# Patient Record
Sex: MALE | Race: WHITE | NOT HISPANIC OR LATINO | Employment: OTHER | URBAN - METROPOLITAN AREA
[De-identification: names, ages, dates, MRNs, and addresses within clinical notes are randomized per-mention and may not be internally consistent; named-entity substitution may affect disease eponyms.]

---

## 2020-01-23 ENCOUNTER — CARE COORDINATION (OUTPATIENT)
Dept: CARDIOLOGY | Facility: CLINIC | Age: 65
End: 2020-01-23

## 2020-01-23 PROBLEM — E11.22 TYPE 2 DIABETES MELLITUS WITH STAGE 3 CHRONIC KIDNEY DISEASE, WITHOUT LONG-TERM CURRENT USE OF INSULIN (H): Status: ACTIVE | Noted: 2019-10-07

## 2020-01-23 PROBLEM — I42.8 NONISCHEMIC CARDIOMYOPATHY (H): Status: ACTIVE | Noted: 2020-01-14

## 2020-01-23 PROBLEM — I25.10 CORONARY ARTERY DISEASE INVOLVING NATIVE CORONARY ARTERY OF NATIVE HEART WITHOUT ANGINA PECTORIS: Status: ACTIVE | Noted: 2020-01-14

## 2020-01-23 PROBLEM — N18.30 TYPE 2 DIABETES MELLITUS WITH STAGE 3 CHRONIC KIDNEY DISEASE, WITHOUT LONG-TERM CURRENT USE OF INSULIN (H): Status: ACTIVE | Noted: 2019-10-07

## 2020-01-23 RX ORDER — SPIRONOLACTONE 25 MG/1
25 TABLET ORAL DAILY
Status: ON HOLD | COMMUNITY
Start: 2020-01-23 | End: 2020-03-19

## 2020-01-23 RX ORDER — UBIDECARENONE 200 MG
1 CAPSULE ORAL DAILY
Status: ON HOLD | COMMUNITY
End: 2020-03-19

## 2020-01-23 RX ORDER — ALBUTEROL SULFATE 90 UG/1
2 AEROSOL, METERED RESPIRATORY (INHALATION) EVERY 4 HOURS PRN
Status: ON HOLD | COMMUNITY
End: 2020-03-19

## 2020-01-23 RX ORDER — ASPIRIN 81 MG/1
81 TABLET ORAL DAILY
Status: ON HOLD | COMMUNITY
End: 2020-03-19

## 2020-01-23 RX ORDER — FUROSEMIDE 40 MG
40 TABLET ORAL DAILY
Status: ON HOLD | COMMUNITY
Start: 2020-01-23 | End: 2020-03-19

## 2020-01-23 RX ORDER — GLIPIZIDE 2.5 MG/1
2.5 TABLET, EXTENDED RELEASE ORAL DAILY
Status: ON HOLD | COMMUNITY
End: 2020-03-19

## 2020-01-23 RX ORDER — ALLOPURINOL 100 MG/1
200 TABLET ORAL DAILY
Status: ON HOLD | COMMUNITY
Start: 2014-01-06 | End: 2020-03-19

## 2020-01-23 RX ORDER — INSULIN GLARGINE 100 [IU]/ML
15 INJECTION, SOLUTION SUBCUTANEOUS AT BEDTIME
Status: ON HOLD | COMMUNITY
End: 2020-03-19

## 2020-01-23 RX ORDER — ACETAMINOPHEN 500 MG
1000 TABLET ORAL DAILY PRN
Status: ON HOLD | COMMUNITY
End: 2020-03-19

## 2020-01-23 RX ORDER — MAGNESIUM OXIDE 400 MG/1
400 TABLET ORAL DAILY
Status: ON HOLD | COMMUNITY
End: 2020-03-19

## 2020-01-23 RX ORDER — ONDANSETRON 4 MG/1
4 TABLET, FILM COATED ORAL EVERY 4 HOURS PRN
Status: ON HOLD | COMMUNITY
Start: 2019-12-24 | End: 2020-11-15

## 2020-01-23 RX ORDER — ATORVASTATIN CALCIUM 20 MG/1
20 TABLET, FILM COATED ORAL DAILY
Status: ON HOLD | COMMUNITY
Start: 2019-11-07 | End: 2020-03-19

## 2020-01-23 NOTE — PROGRESS NOTES
Referral- Heart Failure    Referring Clinic: [facility]   Referring Provider: Dr. Sykes and Dr. Kay Browne  Contact Information: Phone: [phone] Fax:[phone]      [facility] Notes:       January 23, 2020 Patient was just released from [facility] today and was being referred to Dr. Cisneros's out reach clinic in Feb.    January 23, 2020 Left message for patient to call back.      Insurance - Entered    Patient Contact - Yes    Sending to [facility] Nurse to Triage    Paulie Ramsey [facility] Heart [facility]  CORE/Heart Failure   Heart Failure Referral Coordinator  Phone: [phone]

## 2020-01-29 ENCOUNTER — TRANSFERRED RECORDS (OUTPATIENT)
Dept: HEALTH INFORMATION MANAGEMENT | Facility: CLINIC | Age: 65
End: 2020-01-29

## 2020-02-03 ENCOUNTER — DOCUMENTATION ONLY (OUTPATIENT)
Dept: CARE COORDINATION | Facility: CLINIC | Age: 65
End: 2020-02-03

## 2020-02-06 ENCOUNTER — APPOINTMENT (OUTPATIENT)
Dept: GENERAL RADIOLOGY | Facility: CLINIC | Age: 65
DRG: 001 | End: 2020-02-06
Attending: INTERNAL MEDICINE
Payer: MEDICARE

## 2020-02-06 ENCOUNTER — HOSPITAL ENCOUNTER (INPATIENT)
Facility: CLINIC | Age: 65
LOS: 43 days | Discharge: HOME | DRG: 001 | End: 2020-03-20
Attending: INTERNAL MEDICINE | Admitting: INTERNAL MEDICINE
Payer: MEDICARE

## 2020-02-06 ENCOUNTER — TRANSFERRED RECORDS (OUTPATIENT)
Dept: HEALTH INFORMATION MANAGEMENT | Facility: CLINIC | Age: 65
End: 2020-02-06

## 2020-02-06 DIAGNOSIS — I50.9 HEART FAILURE (H): ICD-10-CM

## 2020-02-06 DIAGNOSIS — I50.814 RIGHT HEART FAILURE SECONDARY TO LEFT HEART FAILURE (H): ICD-10-CM

## 2020-02-06 DIAGNOSIS — I46.9 CARDIAC ARREST (H): ICD-10-CM

## 2020-02-06 DIAGNOSIS — Z95.811 LVAD (LEFT VENTRICULAR ASSIST DEVICE) PRESENT (H): Primary | ICD-10-CM

## 2020-02-06 DIAGNOSIS — Z95.810 ICD (IMPLANTABLE CARDIOVERTER-DEFIBRILLATOR) IN PLACE: ICD-10-CM

## 2020-02-06 DIAGNOSIS — J94.2 HEMOTHORAX, RIGHT: ICD-10-CM

## 2020-02-06 LAB
BASE DEFICIT BLDA-SCNC: 5.8 MMOL/L
BASE DEFICIT BLDV-SCNC: 4.7 MMOL/L
ERYTHROCYTE [DISTWIDTH] IN BLOOD BY AUTOMATED COUNT: 18.6 % (ref 10–15)
GLUCOSE BLD-MCNC: 214 MG/DL (ref 70–99)
GLUCOSE BLDC GLUCOMTR-MCNC: 269 MG/DL (ref 70–99)
HCO3 BLD-SCNC: 18 MMOL/L (ref 21–28)
HCO3 BLDV-SCNC: 21 MMOL/L (ref 21–28)
HCT VFR BLD AUTO: 44.4 % (ref 40–53)
HGB BLD-MCNC: 14 G/DL (ref 13.3–17.7)
INR PPP: 1.32 (ref 0.86–1.14)
LACTATE BLD-SCNC: 2 MMOL/L (ref 0.7–2)
MCH RBC QN AUTO: 27.6 PG (ref 26.5–33)
MCHC RBC AUTO-ENTMCNC: 31.5 G/DL (ref 31.5–36.5)
MCV RBC AUTO: 87 FL (ref 78–100)
O2/TOTAL GAS SETTING VFR VENT: 21 %
O2/TOTAL GAS SETTING VFR VENT: 21 %
OXYHGB MFR BLD: 94 % (ref 92–100)
OXYHGB MFR BLDV: 54 %
PCO2 BLD: 29 MM HG (ref 35–45)
PCO2 BLDV: 38 MM HG (ref 40–50)
PH BLD: 7.39 PH (ref 7.35–7.45)
PH BLDV: 7.34 PH (ref 7.32–7.43)
PLATELET # BLD AUTO: 268 10E9/L (ref 150–450)
PO2 BLD: 76 MM HG (ref 80–105)
PO2 BLDV: 33 MM HG (ref 25–47)
RBC # BLD AUTO: 5.08 10E12/L (ref 4.4–5.9)
WBC # BLD AUTO: 9 10E9/L (ref 4–11)

## 2020-02-06 PROCEDURE — 25000132 ZZH RX MED GY IP 250 OP 250 PS 637: Mod: GY

## 2020-02-06 PROCEDURE — 83735 ASSAY OF MAGNESIUM: CPT

## 2020-02-06 PROCEDURE — 82805 BLOOD GASES W/O2 SATURATION: CPT

## 2020-02-06 PROCEDURE — 85610 PROTHROMBIN TIME: CPT

## 2020-02-06 PROCEDURE — 84484 ASSAY OF TROPONIN QUANT: CPT

## 2020-02-06 PROCEDURE — 80053 COMPREHEN METABOLIC PANEL: CPT

## 2020-02-06 PROCEDURE — 40000986 XR CHEST PORT 1 VW

## 2020-02-06 PROCEDURE — 83605 ASSAY OF LACTIC ACID: CPT

## 2020-02-06 PROCEDURE — 25000128 H RX IP 250 OP 636

## 2020-02-06 PROCEDURE — 82947 ASSAY GLUCOSE BLOOD QUANT: CPT

## 2020-02-06 PROCEDURE — 00000146 ZZHCL STATISTIC GLUCOSE BY METER IP

## 2020-02-06 PROCEDURE — 83036 HEMOGLOBIN GLYCOSYLATED A1C: CPT

## 2020-02-06 PROCEDURE — 99291 CRITICAL CARE FIRST HOUR: CPT | Mod: AI | Performed by: INTERNAL MEDICINE

## 2020-02-06 PROCEDURE — 84100 ASSAY OF PHOSPHORUS: CPT

## 2020-02-06 PROCEDURE — 25800030 ZZH RX IP 258 OP 636

## 2020-02-06 PROCEDURE — 20000004 ZZH R&B ICU UMMC

## 2020-02-06 PROCEDURE — 85027 COMPLETE CBC AUTOMATED: CPT

## 2020-02-06 RX ORDER — HEPARIN SODIUM 5000 [USP'U]/.5ML
5000 INJECTION, SOLUTION INTRAVENOUS; SUBCUTANEOUS EVERY 12 HOURS
Status: DISCONTINUED | OUTPATIENT
Start: 2020-02-07 | End: 2020-02-08

## 2020-02-06 RX ORDER — LIDOCAINE 40 MG/G
CREAM TOPICAL
Status: DISCONTINUED | OUTPATIENT
Start: 2020-02-06 | End: 2020-02-19

## 2020-02-06 RX ORDER — POTASSIUM CHLORIDE 7.45 MG/ML
10 INJECTION INTRAVENOUS
Status: DISCONTINUED | OUTPATIENT
Start: 2020-02-06 | End: 2020-02-13 | Stop reason: CLARIF

## 2020-02-06 RX ORDER — UBIDECARENONE 100 MG
200 CAPSULE ORAL DAILY
Status: DISCONTINUED | OUTPATIENT
Start: 2020-02-07 | End: 2020-02-22

## 2020-02-06 RX ORDER — NICOTINE POLACRILEX 4 MG
15-30 LOZENGE BUCCAL
Status: DISCONTINUED | OUTPATIENT
Start: 2020-02-06 | End: 2020-02-19

## 2020-02-06 RX ORDER — ACETAMINOPHEN 325 MG/1
650 TABLET ORAL EVERY 4 HOURS PRN
Status: DISCONTINUED | OUTPATIENT
Start: 2020-02-06 | End: 2020-02-09

## 2020-02-06 RX ORDER — POTASSIUM CHLORIDE 29.8 MG/ML
20 INJECTION INTRAVENOUS
Status: DISCONTINUED | OUTPATIENT
Start: 2020-02-06 | End: 2020-02-13 | Stop reason: CLARIF

## 2020-02-06 RX ORDER — POTASSIUM CL/LIDO/0.9 % NACL 10MEQ/0.1L
10 INTRAVENOUS SOLUTION, PIGGYBACK (ML) INTRAVENOUS
Status: DISCONTINUED | OUTPATIENT
Start: 2020-02-06 | End: 2020-02-13 | Stop reason: CLARIF

## 2020-02-06 RX ORDER — POTASSIUM CHLORIDE 750 MG/1
20-40 TABLET, EXTENDED RELEASE ORAL
Status: DISCONTINUED | OUTPATIENT
Start: 2020-02-06 | End: 2020-02-13 | Stop reason: CLARIF

## 2020-02-06 RX ORDER — ACETAMINOPHEN 650 MG/1
650 SUPPOSITORY RECTAL EVERY 4 HOURS PRN
Status: DISCONTINUED | OUTPATIENT
Start: 2020-02-06 | End: 2020-02-07

## 2020-02-06 RX ORDER — ALLOPURINOL 100 MG/1
200 TABLET ORAL DAILY
Status: DISCONTINUED | OUTPATIENT
Start: 2020-02-07 | End: 2020-02-22

## 2020-02-06 RX ORDER — MILRINONE LACTATE 0.2 MG/ML
0.5 INJECTION, SOLUTION INTRAVENOUS CONTINUOUS
Status: DISCONTINUED | OUTPATIENT
Start: 2020-02-06 | End: 2020-02-06

## 2020-02-06 RX ORDER — POTASSIUM CHLORIDE 1.5 G/1.58G
20-40 POWDER, FOR SOLUTION ORAL
Status: DISCONTINUED | OUTPATIENT
Start: 2020-02-06 | End: 2020-02-13 | Stop reason: CLARIF

## 2020-02-06 RX ORDER — ALBUTEROL SULFATE 90 UG/1
2 AEROSOL, METERED RESPIRATORY (INHALATION) EVERY 6 HOURS PRN
Status: DISCONTINUED | OUTPATIENT
Start: 2020-02-06 | End: 2020-03-20 | Stop reason: HOSPADM

## 2020-02-06 RX ORDER — MAGNESIUM SULFATE HEPTAHYDRATE 40 MG/ML
2 INJECTION, SOLUTION INTRAVENOUS DAILY PRN
Status: DISCONTINUED | OUTPATIENT
Start: 2020-02-06 | End: 2020-02-13 | Stop reason: CLARIF

## 2020-02-06 RX ORDER — ATORVASTATIN CALCIUM 10 MG/1
20 TABLET, FILM COATED ORAL EVERY EVENING
Status: DISCONTINUED | OUTPATIENT
Start: 2020-02-06 | End: 2020-02-22

## 2020-02-06 RX ORDER — FUROSEMIDE 10 MG/ML
80 INJECTION INTRAMUSCULAR; INTRAVENOUS ONCE
Status: COMPLETED | OUTPATIENT
Start: 2020-02-06 | End: 2020-02-06

## 2020-02-06 RX ORDER — DEXTROSE MONOHYDRATE 25 G/50ML
25-50 INJECTION, SOLUTION INTRAVENOUS
Status: DISCONTINUED | OUTPATIENT
Start: 2020-02-06 | End: 2020-02-19

## 2020-02-06 RX ORDER — ASPIRIN 81 MG/1
81 TABLET, CHEWABLE ORAL DAILY
Status: DISCONTINUED | OUTPATIENT
Start: 2020-02-07 | End: 2020-02-22

## 2020-02-06 RX ORDER — MAGNESIUM SULFATE HEPTAHYDRATE 40 MG/ML
4 INJECTION, SOLUTION INTRAVENOUS EVERY 4 HOURS PRN
Status: DISCONTINUED | OUTPATIENT
Start: 2020-02-06 | End: 2020-02-13 | Stop reason: CLARIF

## 2020-02-06 RX ORDER — MAGNESIUM OXIDE 400 MG/1
400 TABLET ORAL DAILY
Status: DISCONTINUED | OUTPATIENT
Start: 2020-02-07 | End: 2020-02-22

## 2020-02-06 RX ORDER — MILRINONE LACTATE 0.2 MG/ML
0.5 INJECTION, SOLUTION INTRAVENOUS CONTINUOUS
Status: DISCONTINUED | OUTPATIENT
Start: 2020-02-07 | End: 2020-02-08

## 2020-02-06 RX ADMIN — FUROSEMIDE 80 MG: 10 INJECTION, SOLUTION INTRAVENOUS at 23:43

## 2020-02-06 RX ADMIN — ATORVASTATIN CALCIUM 20 MG: 20 TABLET, FILM COATED ORAL at 23:43

## 2020-02-06 RX ADMIN — MILRINONE LACTATE IN DEXTROSE 0.5 MCG/KG/MIN: 200 INJECTION, SOLUTION INTRAVENOUS at 23:33

## 2020-02-06 RX ADMIN — AMIODARONE HYDROCHLORIDE 1 MG/MIN: 50 INJECTION, SOLUTION INTRAVENOUS at 23:28

## 2020-02-06 ASSESSMENT — MIFFLIN-ST. JEOR: SCORE: 1827.63

## 2020-02-06 NOTE — Clinical Note
Potential access sites were evaluated for patency using ultrasound.   The left femoral artery, right radial artery and right jugular vein was selected. Access was obtained under with Sonosite guidance using a standard 18 gauge needle and 21 gauge Radial  with direct visualization of needle entry.

## 2020-02-06 NOTE — Clinical Note
IABP inserted in the left femoral artery. IABP inserted with 50 cc balloon volume Lot number is: [id number]

## 2020-02-06 NOTE — LETTER
[address] [place] [address]  [phone]    2020    Re: Eliseo Tanner  [address] [place] [address]  [phone] (home) [phone] (work)    : 1955      To Whom It May Concern:      Eliseo Tanner was hospitalized from 2020 until present time.       Cardiology care team.

## 2020-02-06 NOTE — Clinical Note
IABP inserted in the right femoral artery. IABP inserted with 50 cc balloon volume Lot number is: [id number]

## 2020-02-06 NOTE — LETTER
[facility], [facility]  UNIT [address]E [facility] [address]AST [address] [address]  [phone]    To whom it may concern,    Mr. Eliseo Tanner is currently hospitalized at the [facility] in the cardiac ICU.    Sincerely yours,    Reji Delarosa MD

## 2020-02-07 ENCOUNTER — APPOINTMENT (OUTPATIENT)
Dept: CARDIOLOGY | Facility: CLINIC | Age: 65
DRG: 001 | End: 2020-02-07
Attending: INTERNAL MEDICINE
Payer: MEDICARE

## 2020-02-07 ENCOUNTER — APPOINTMENT (OUTPATIENT)
Dept: GENERAL RADIOLOGY | Facility: CLINIC | Age: 65
DRG: 001 | End: 2020-02-07
Attending: INTERNAL MEDICINE
Payer: MEDICARE

## 2020-02-07 ENCOUNTER — TEAM CONFERENCE (OUTPATIENT)
Dept: CARDIOLOGY | Facility: CLINIC | Age: 65
End: 2020-02-07

## 2020-02-07 PROBLEM — I50.814: Status: ACTIVE | Noted: 2020-02-06

## 2020-02-07 LAB
ALBUMIN SERPL-MCNC: 2.9 G/DL (ref 3.4–5)
ALBUMIN SERPL-MCNC: 3.2 G/DL (ref 3.4–5)
ALBUMIN UR-MCNC: 30 MG/DL
ALP SERPL-CCNC: 156 U/L (ref 40–150)
ALP SERPL-CCNC: 175 U/L (ref 40–150)
ALT SERPL W P-5'-P-CCNC: 32 U/L (ref 0–70)
ALT SERPL W P-5'-P-CCNC: 43 U/L (ref 0–70)
ANION GAP SERPL CALCULATED.3IONS-SCNC: 11 MMOL/L (ref 3–14)
ANION GAP SERPL CALCULATED.3IONS-SCNC: 11 MMOL/L (ref 3–14)
ANION GAP SERPL CALCULATED.3IONS-SCNC: 8 MMOL/L (ref 3–14)
APPEARANCE UR: ABNORMAL
AST SERPL W P-5'-P-CCNC: 29 U/L (ref 0–45)
AST SERPL W P-5'-P-CCNC: 49 U/L (ref 0–45)
BASE DEFICIT BLDV-SCNC: 2.9 MMOL/L
BASE DEFICIT BLDV-SCNC: 3.2 MMOL/L
BASE DEFICIT BLDV-SCNC: 3.8 MMOL/L
BASE DEFICIT BLDV-SCNC: 4 MMOL/L
BILIRUB SERPL-MCNC: 0.9 MG/DL (ref 0.2–1.3)
BILIRUB SERPL-MCNC: 1.2 MG/DL (ref 0.2–1.3)
BILIRUB UR QL STRIP: NEGATIVE
BUN SERPL-MCNC: 39 MG/DL (ref 7–30)
BUN SERPL-MCNC: 39 MG/DL (ref 7–30)
BUN SERPL-MCNC: 44 MG/DL (ref 7–30)
CALCIUM SERPL-MCNC: 8.3 MG/DL (ref 8.5–10.1)
CALCIUM SERPL-MCNC: 8.4 MG/DL (ref 8.5–10.1)
CALCIUM SERPL-MCNC: 8.4 MG/DL (ref 8.5–10.1)
CHLORIDE SERPL-SCNC: 104 MMOL/L (ref 94–109)
CHLORIDE SERPL-SCNC: 107 MMOL/L (ref 94–109)
CHLORIDE SERPL-SCNC: 108 MMOL/L (ref 94–109)
CO2 SERPL-SCNC: 17 MMOL/L (ref 20–32)
CO2 SERPL-SCNC: 18 MMOL/L (ref 20–32)
CO2 SERPL-SCNC: 22 MMOL/L (ref 20–32)
COLOR UR AUTO: YELLOW
CREAT SERPL-MCNC: 1.73 MG/DL (ref 0.66–1.25)
CREAT SERPL-MCNC: 1.94 MG/DL (ref 0.66–1.25)
CREAT SERPL-MCNC: 2.78 MG/DL (ref 0.66–1.25)
ERYTHROCYTE [DISTWIDTH] IN BLOOD BY AUTOMATED COUNT: 18.3 % (ref 10–15)
GFR SERPL CREATININE-BSD FRML MDRD: 23 ML/MIN/{1.73_M2}
GFR SERPL CREATININE-BSD FRML MDRD: 35 ML/MIN/{1.73_M2}
GFR SERPL CREATININE-BSD FRML MDRD: 40 ML/MIN/{1.73_M2}
GLUCOSE BLDC GLUCOMTR-MCNC: 145 MG/DL (ref 70–99)
GLUCOSE BLDC GLUCOMTR-MCNC: 146 MG/DL (ref 70–99)
GLUCOSE BLDC GLUCOMTR-MCNC: 149 MG/DL (ref 70–99)
GLUCOSE BLDC GLUCOMTR-MCNC: 149 MG/DL (ref 70–99)
GLUCOSE BLDC GLUCOMTR-MCNC: 174 MG/DL (ref 70–99)
GLUCOSE BLDC GLUCOMTR-MCNC: 202 MG/DL (ref 70–99)
GLUCOSE SERPL-MCNC: 160 MG/DL (ref 70–99)
GLUCOSE SERPL-MCNC: 193 MG/DL (ref 70–99)
GLUCOSE SERPL-MCNC: 207 MG/DL (ref 70–99)
GLUCOSE UR STRIP-MCNC: NEGATIVE MG/DL
HBA1C MFR BLD: 7.2 % (ref 0–5.6)
HCO3 BLDV-SCNC: 22 MMOL/L (ref 21–28)
HCO3 BLDV-SCNC: 22 MMOL/L (ref 21–28)
HCO3 BLDV-SCNC: 23 MMOL/L (ref 21–28)
HCO3 BLDV-SCNC: 23 MMOL/L (ref 21–28)
HCT VFR BLD AUTO: 41.5 % (ref 40–53)
HGB BLD-MCNC: 13.6 G/DL (ref 13.3–17.7)
HGB UR QL STRIP: NEGATIVE
HYALINE CASTS #/AREA URNS LPF: 32 /LPF (ref 0–2)
INTERPRETATION ECG - MUSE: NORMAL
KETONES UR STRIP-MCNC: NEGATIVE MG/DL
LACTATE BLD-SCNC: 0.9 MMOL/L (ref 0.7–2)
LACTATE BLD-SCNC: 1.4 MMOL/L (ref 0.7–2)
LACTATE BLD-SCNC: 2 MMOL/L (ref 0.7–2)
LEUKOCYTE ESTERASE UR QL STRIP: NEGATIVE
MAGNESIUM SERPL-MCNC: 1.9 MG/DL (ref 1.6–2.3)
MCH RBC QN AUTO: 28.3 PG (ref 26.5–33)
MCHC RBC AUTO-ENTMCNC: 32.8 G/DL (ref 31.5–36.5)
MCV RBC AUTO: 86 FL (ref 78–100)
NITRATE UR QL: NEGATIVE
O2/TOTAL GAS SETTING VFR VENT: ABNORMAL %
O2/TOTAL GAS SETTING VFR VENT: NORMAL %
OXYHGB MFR BLDV: 46 %
OXYHGB MFR BLDV: 50 %
OXYHGB MFR BLDV: 56 %
OXYHGB MFR BLDV: 65 %
PCO2 BLDV: 43 MM HG (ref 40–50)
PCO2 BLDV: 44 MM HG (ref 40–50)
PH BLDV: 7.31 PH (ref 7.32–7.43)
PH BLDV: 7.32 PH (ref 7.32–7.43)
PH BLDV: 7.33 PH (ref 7.32–7.43)
PH BLDV: 7.33 PH (ref 7.32–7.43)
PH UR STRIP: 5 PH (ref 5–7)
PHOSPHATE SERPL-MCNC: 3.9 MG/DL (ref 2.5–4.5)
PLATELET # BLD AUTO: 240 10E9/L (ref 150–450)
PO2 BLDV: 30 MM HG (ref 25–47)
PO2 BLDV: 33 MM HG (ref 25–47)
PO2 BLDV: 34 MM HG (ref 25–47)
PO2 BLDV: 40 MM HG (ref 25–47)
POTASSIUM SERPL-SCNC: 3.9 MMOL/L (ref 3.4–5.3)
POTASSIUM SERPL-SCNC: 4 MMOL/L (ref 3.4–5.3)
POTASSIUM SERPL-SCNC: 4.2 MMOL/L (ref 3.4–5.3)
POTASSIUM SERPL-SCNC: 4.2 MMOL/L (ref 3.4–5.3)
PROT SERPL-MCNC: 7 G/DL (ref 6.8–8.8)
PROT SERPL-MCNC: 7.5 G/DL (ref 6.8–8.8)
RBC # BLD AUTO: 4.81 10E12/L (ref 4.4–5.9)
RBC #/AREA URNS AUTO: 7 /HPF (ref 0–2)
SODIUM SERPL-SCNC: 134 MMOL/L (ref 133–144)
SODIUM SERPL-SCNC: 136 MMOL/L (ref 133–144)
SODIUM SERPL-SCNC: 136 MMOL/L (ref 133–144)
SOURCE: ABNORMAL
SP GR UR STRIP: 1.02 (ref 1–1.03)
SPERM #/AREA URNS HPF: PRESENT /HPF
SQUAMOUS #/AREA URNS AUTO: 2 /HPF (ref 0–1)
TROPONIN I SERPL-MCNC: 0.14 UG/L (ref 0–0.04)
TROPONIN I SERPL-MCNC: 0.15 UG/L (ref 0–0.04)
UROBILINOGEN UR STRIP-MCNC: NORMAL MG/DL (ref 0–2)
WBC # BLD AUTO: 7 10E9/L (ref 4–11)
WBC #/AREA URNS AUTO: 3 /HPF (ref 0–5)

## 2020-02-07 PROCEDURE — 40000048 ZZH STATISTIC DAILY SWAN MONITORING

## 2020-02-07 PROCEDURE — 83036 HEMOGLOBIN GLYCOSYLATED A1C: CPT

## 2020-02-07 PROCEDURE — 40000076 ZZH STATISTIC IABP MONITORING

## 2020-02-07 PROCEDURE — 5A02210 ASSISTANCE WITH CARDIAC OUTPUT USING BALLOON PUMP, CONTINUOUS: ICD-10-PCS | Performed by: INTERNAL MEDICINE

## 2020-02-07 PROCEDURE — 93306 TTE W/DOPPLER COMPLETE: CPT | Mod: 26 | Performed by: INTERNAL MEDICINE

## 2020-02-07 PROCEDURE — 93306 TTE W/DOPPLER COMPLETE: CPT

## 2020-02-07 PROCEDURE — 80320 DRUG SCREEN QUANTALCOHOLS: CPT | Performed by: INTERNAL MEDICINE

## 2020-02-07 PROCEDURE — 25000128 H RX IP 250 OP 636: Performed by: INTERNAL MEDICINE

## 2020-02-07 PROCEDURE — 40000281 ZZH STATISTIC TRANSPORT TIME EA 15 MIN

## 2020-02-07 PROCEDURE — 27210794 ZZH OR GENERAL SUPPLY STERILE: Performed by: INTERNAL MEDICINE

## 2020-02-07 PROCEDURE — 82805 BLOOD GASES W/O2 SATURATION: CPT | Performed by: INTERNAL MEDICINE

## 2020-02-07 PROCEDURE — 80053 COMPREHEN METABOLIC PANEL: CPT

## 2020-02-07 PROCEDURE — 33967 INSERT I-AORT PERCUT DEVICE: CPT | Performed by: INTERNAL MEDICINE

## 2020-02-07 PROCEDURE — 99291 CRITICAL CARE FIRST HOUR: CPT | Mod: 25 | Performed by: INTERNAL MEDICINE

## 2020-02-07 PROCEDURE — 84484 ASSAY OF TROPONIN QUANT: CPT

## 2020-02-07 PROCEDURE — 85027 COMPLETE CBC AUTOMATED: CPT

## 2020-02-07 PROCEDURE — 93005 ELECTROCARDIOGRAM TRACING: CPT

## 2020-02-07 PROCEDURE — G0463 HOSPITAL OUTPT CLINIC VISIT: HCPCS

## 2020-02-07 PROCEDURE — 40000196 ZZH STATISTIC RAPCV CVP MONITORING

## 2020-02-07 PROCEDURE — 27210140 ZZH KIT CATH SWAN VIP SUPPLY

## 2020-02-07 PROCEDURE — 83605 ASSAY OF LACTIC ACID: CPT

## 2020-02-07 PROCEDURE — 40000275 ZZH STATISTIC RCP TIME EA 10 MIN

## 2020-02-07 PROCEDURE — 25000132 ZZH RX MED GY IP 250 OP 250 PS 637: Mod: GY

## 2020-02-07 PROCEDURE — 20000004 ZZH R&B ICU UMMC

## 2020-02-07 PROCEDURE — 25000125 ZZHC RX 250: Performed by: INTERNAL MEDICINE

## 2020-02-07 PROCEDURE — 25000128 H RX IP 250 OP 636: Performed by: STUDENT IN AN ORGANIZED HEALTH CARE EDUCATION/TRAINING PROGRAM

## 2020-02-07 PROCEDURE — 80048 BASIC METABOLIC PNL TOTAL CA: CPT | Performed by: INTERNAL MEDICINE

## 2020-02-07 PROCEDURE — 25800030 ZZH RX IP 258 OP 636

## 2020-02-07 PROCEDURE — 25800030 ZZH RX IP 258 OP 636: Performed by: INTERNAL MEDICINE

## 2020-02-07 PROCEDURE — 25500064 ZZH RX 255 OP 636: Performed by: INTERNAL MEDICINE

## 2020-02-07 PROCEDURE — 93010 ELECTROCARDIOGRAM REPORT: CPT | Performed by: INTERNAL MEDICINE

## 2020-02-07 PROCEDURE — 25000132 ZZH RX MED GY IP 250 OP 250 PS 637: Mod: GY | Performed by: STUDENT IN AN ORGANIZED HEALTH CARE EDUCATION/TRAINING PROGRAM

## 2020-02-07 PROCEDURE — 84132 ASSAY OF SERUM POTASSIUM: CPT

## 2020-02-07 PROCEDURE — 25000131 ZZH RX MED GY IP 250 OP 636 PS 637: Mod: GY

## 2020-02-07 PROCEDURE — 00000146 ZZHCL STATISTIC GLUCOSE BY METER IP

## 2020-02-07 PROCEDURE — 40000986 XR CHEST PORT 1 VW

## 2020-02-07 PROCEDURE — 82805 BLOOD GASES W/O2 SATURATION: CPT | Performed by: PHYSICIAN ASSISTANT

## 2020-02-07 PROCEDURE — 25000128 H RX IP 250 OP 636

## 2020-02-07 PROCEDURE — 40000014 ZZH STATISTIC ARTERIAL MONITORING DAILY

## 2020-02-07 PROCEDURE — 80307 DRUG TEST PRSMV CHEM ANLYZR: CPT | Performed by: INTERNAL MEDICINE

## 2020-02-07 PROCEDURE — 81001 URINALYSIS AUTO W/SCOPE: CPT | Performed by: INTERNAL MEDICINE

## 2020-02-07 PROCEDURE — 27210305 ZZH CATH BALLOON IABP

## 2020-02-07 PROCEDURE — 83605 ASSAY OF LACTIC ACID: CPT | Performed by: INTERNAL MEDICINE

## 2020-02-07 PROCEDURE — 40000358 ZZHCL STATISTIC DRUG SCREEN MULTIPLE (METRO): Performed by: INTERNAL MEDICINE

## 2020-02-07 RX ORDER — POTASSIUM CHLORIDE 750 MG/1
40 TABLET, EXTENDED RELEASE ORAL
Status: CANCELLED | OUTPATIENT
Start: 2020-02-07

## 2020-02-07 RX ORDER — ACETAMINOPHEN 325 MG/1
325 TABLET ORAL EVERY 4 HOURS PRN
Status: DISCONTINUED | OUTPATIENT
Start: 2020-02-07 | End: 2020-02-09

## 2020-02-07 RX ORDER — NALOXONE HYDROCHLORIDE 0.4 MG/ML
.1-.4 INJECTION, SOLUTION INTRAMUSCULAR; INTRAVENOUS; SUBCUTANEOUS
Status: DISCONTINUED | OUTPATIENT
Start: 2020-02-07 | End: 2020-02-19

## 2020-02-07 RX ORDER — FENTANYL CITRATE 50 UG/ML
25 INJECTION, SOLUTION INTRAMUSCULAR; INTRAVENOUS
Status: DISCONTINUED | OUTPATIENT
Start: 2020-02-07 | End: 2020-02-25

## 2020-02-07 RX ORDER — POTASSIUM CHLORIDE 750 MG/1
20 TABLET, EXTENDED RELEASE ORAL
Status: CANCELLED | OUTPATIENT
Start: 2020-02-07

## 2020-02-07 RX ORDER — SODIUM CHLORIDE 9 MG/ML
INJECTION, SOLUTION INTRAVENOUS CONTINUOUS
Status: CANCELLED | OUTPATIENT
Start: 2020-02-07

## 2020-02-07 RX ORDER — ONDANSETRON 2 MG/ML
4 INJECTION INTRAMUSCULAR; INTRAVENOUS ONCE
Status: COMPLETED | OUTPATIENT
Start: 2020-02-07 | End: 2020-02-07

## 2020-02-07 RX ORDER — FENTANYL CITRATE 50 UG/ML
INJECTION, SOLUTION INTRAMUSCULAR; INTRAVENOUS
Status: DISCONTINUED | OUTPATIENT
Start: 2020-02-07 | End: 2020-02-07 | Stop reason: HOSPADM

## 2020-02-07 RX ORDER — DOPAMINE HYDROCHLORIDE 160 MG/100ML
2 INJECTION, SOLUTION INTRAVENOUS CONTINUOUS
Status: DISCONTINUED | OUTPATIENT
Start: 2020-02-07 | End: 2020-02-12

## 2020-02-07 RX ORDER — CALCIUM CARBONATE 500 MG/1
500 TABLET, CHEWABLE ORAL DAILY PRN
Status: DISCONTINUED | OUTPATIENT
Start: 2020-02-07 | End: 2020-02-22

## 2020-02-07 RX ORDER — BUMETANIDE 0.25 MG/ML
2 INJECTION INTRAMUSCULAR; INTRAVENOUS ONCE
Status: COMPLETED | OUTPATIENT
Start: 2020-02-07 | End: 2020-02-07

## 2020-02-07 RX ORDER — BUMETANIDE 0.25 MG/ML
4 INJECTION INTRAMUSCULAR; INTRAVENOUS EVERY 12 HOURS
Status: DISCONTINUED | OUTPATIENT
Start: 2020-02-07 | End: 2020-02-09

## 2020-02-07 RX ORDER — FENTANYL CITRATE 50 UG/ML
INJECTION, SOLUTION INTRAMUSCULAR; INTRAVENOUS
Status: COMPLETED
Start: 2020-02-07 | End: 2020-02-07

## 2020-02-07 RX ORDER — BUMETANIDE 0.25 MG/ML
5 INJECTION INTRAMUSCULAR; INTRAVENOUS ONCE
Status: COMPLETED | OUTPATIENT
Start: 2020-02-07 | End: 2020-02-07

## 2020-02-07 RX ORDER — LIDOCAINE 40 MG/G
CREAM TOPICAL
Status: CANCELLED | OUTPATIENT
Start: 2020-02-07

## 2020-02-07 RX ADMIN — AMIODARONE HYDROCHLORIDE 1 MG/MIN: 50 INJECTION, SOLUTION INTRAVENOUS at 11:29

## 2020-02-07 RX ADMIN — AMIODARONE HYDROCHLORIDE 1 MG/MIN: 50 INJECTION, SOLUTION INTRAVENOUS at 06:22

## 2020-02-07 RX ADMIN — AMIODARONE HYDROCHLORIDE 1 MG/MIN: 50 INJECTION, SOLUTION INTRAVENOUS at 15:48

## 2020-02-07 RX ADMIN — BUMETANIDE 5 MG: 0.25 INJECTION INTRAMUSCULAR; INTRAVENOUS at 20:11

## 2020-02-07 RX ADMIN — AMIODARONE HYDROCHLORIDE 1 MG/MIN: 50 INJECTION, SOLUTION INTRAVENOUS at 02:44

## 2020-02-07 RX ADMIN — BUMETANIDE 4 MG: 0.25 INJECTION INTRAMUSCULAR; INTRAVENOUS at 17:34

## 2020-02-07 RX ADMIN — FENTANYL CITRATE 25 MCG: 50 INJECTION INTRAMUSCULAR; INTRAVENOUS at 13:50

## 2020-02-07 RX ADMIN — CALCIUM CARBONATE (ANTACID) CHEW TAB 500 MG 500 MG: 500 CHEW TAB at 13:14

## 2020-02-07 RX ADMIN — MIDAZOLAM 1 MG: 1 INJECTION INTRAMUSCULAR; INTRAVENOUS at 13:50

## 2020-02-07 RX ADMIN — INSULIN ASPART 1 UNITS: 100 INJECTION, SOLUTION INTRAVENOUS; SUBCUTANEOUS at 01:58

## 2020-02-07 RX ADMIN — Medication 200 MG: at 08:19

## 2020-02-07 RX ADMIN — MILRINONE LACTATE IN DEXTROSE 0.5 MCG/KG/MIN: 200 INJECTION, SOLUTION INTRAVENOUS at 04:17

## 2020-02-07 RX ADMIN — FLUOXETINE 20 MG: 20 CAPSULE ORAL at 08:18

## 2020-02-07 RX ADMIN — ASPIRIN 81 MG CHEWABLE TABLET 81 MG: 81 TABLET CHEWABLE at 08:17

## 2020-02-07 RX ADMIN — OMEPRAZOLE 20 MG: 20 CAPSULE, DELAYED RELEASE ORAL at 08:17

## 2020-02-07 RX ADMIN — ACETAMINOPHEN 650 MG: 325 TABLET, FILM COATED ORAL at 08:17

## 2020-02-07 RX ADMIN — SACUBITRIL AND VALSARTAN 1 TABLET: 97; 103 TABLET, FILM COATED ORAL at 02:13

## 2020-02-07 RX ADMIN — INSULIN GLARGINE 10 UNITS: 100 INJECTION, SOLUTION SUBCUTANEOUS at 02:13

## 2020-02-07 RX ADMIN — ONDANSETRON 4 MG: 2 INJECTION INTRAMUSCULAR; INTRAVENOUS at 05:20

## 2020-02-07 RX ADMIN — FENTANYL CITRATE 25 MCG: 50 INJECTION, SOLUTION INTRAMUSCULAR; INTRAVENOUS at 13:50

## 2020-02-07 RX ADMIN — ATORVASTATIN CALCIUM 20 MG: 20 TABLET, FILM COATED ORAL at 20:10

## 2020-02-07 RX ADMIN — MILRINONE LACTATE IN DEXTROSE 0.5 MCG/KG/MIN: 200 INJECTION, SOLUTION INTRAVENOUS at 17:42

## 2020-02-07 RX ADMIN — ALLOPURINOL 200 MG: 100 TABLET ORAL at 08:17

## 2020-02-07 RX ADMIN — INSULIN GLARGINE 10 UNITS: 100 INJECTION, SOLUTION SUBCUTANEOUS at 22:20

## 2020-02-07 RX ADMIN — BUMETANIDE 2 MG: 0.25 INJECTION INTRAMUSCULAR; INTRAVENOUS at 05:34

## 2020-02-07 RX ADMIN — HEPARIN SODIUM 5000 UNITS: 5000 INJECTION, SOLUTION INTRAVENOUS; SUBCUTANEOUS at 20:10

## 2020-02-07 RX ADMIN — CHLOROTHIAZIDE 1000 MG: 250 TABLET ORAL at 22:17

## 2020-02-07 RX ADMIN — MILRINONE LACTATE IN DEXTROSE 0.5 MCG/KG/MIN: 200 INJECTION, SOLUTION INTRAVENOUS at 10:03

## 2020-02-07 RX ADMIN — HUMAN ALBUMIN MICROSPHERES AND PERFLUTREN 6 ML: 10; .22 INJECTION, SOLUTION INTRAVENOUS at 10:45

## 2020-02-07 RX ADMIN — HEPARIN SODIUM 5000 UNITS: 5000 INJECTION, SOLUTION INTRAVENOUS; SUBCUTANEOUS at 08:18

## 2020-02-07 RX ADMIN — DOPAMINE HYDROCHLORIDE IN DEXTROSE 3 MCG/KG/MIN: 1.6 INJECTION, SOLUTION INTRAVENOUS at 17:39

## 2020-02-07 RX ADMIN — MAGNESIUM OXIDE 400 MG: 400 TABLET ORAL at 08:19

## 2020-02-07 RX ADMIN — AMIODARONE HYDROCHLORIDE 0.5 MG/MIN: 50 INJECTION, SOLUTION INTRAVENOUS at 20:57

## 2020-02-07 ASSESSMENT — ACTIVITIES OF DAILY LIVING (ADL)
ADLS_ACUITY_SCORE: 14
COGNITION: 0 - NO COGNITION ISSUES REPORTED
RETIRED_EATING: 0-->INDEPENDENT
ADLS_ACUITY_SCORE: 14
AMBULATION: 0-->INDEPENDENT
ADLS_ACUITY_SCORE: 19
BATHING: 0-->INDEPENDENT
ADLS_ACUITY_SCORE: 14
DRESS: 0-->INDEPENDENT
FALL_HISTORY_WITHIN_LAST_SIX_MONTHS: NO
ADLS_ACUITY_SCORE: 14
RETIRED_COMMUNICATION: 0-->UNDERSTANDS/COMMUNICATES WITHOUT DIFFICULTY
ADLS_ACUITY_SCORE: 14
SWALLOWING: 0-->SWALLOWS FOODS/LIQUIDS WITHOUT DIFFICULTY
TRANSFERRING: 0-->INDEPENDENT
TOILETING: 0-->INDEPENDENT

## 2020-02-07 ASSESSMENT — MIFFLIN-ST. JEOR: SCORE: 1845.63

## 2020-02-07 NOTE — PROGRESS NOTES
[facility] Nurse Inpatient Pressure Injury Assessment   Reason for consultation: Evaluate and treat coccyx and buttock      ASSESSMENT  Coccyx and buttock have intact skin and chronic discoloration NOT consistent with pressure. Per pt this is a normal finding prior to admission.   Status: initial assessment  Recommend provider order: NA     TREATMENT PLAN  NA    WOC Nurse follow-up plan:signing off  Nursing to notify the Provider(s) and re-consult the WO Nurse if wound(s) deteriorates or new skin concern.    Patient History  According to provider note(s):  Mr Eliseo Tanner is a 63yo M w/PMHx notable for chronic systolic heart failure, NICM, moderate CAD, HTN, ABHINAV on CPAP, DM2, CKDIII who is transferred to [facility] for evaluation and management of advanced heart failure with arrhythmia.     Objective Data  Containment of urine/stool: Incontinent pad in bed    Current Diet/ Nutrition:  Orders Placed This Encounter      NPO for Medical/Clinical Reasons Except for: Meds    Output:   I/O last 3 completed shifts:  In: 710.4 [P.O.:90; I.V.:620.4]  Out: 250 [Urine:250]    Risk Assessment:   Sensory Perception: 4-->no impairment  Moisture: 4-->rarely moist  Activity: 1-->bedfast  Mobility: 4-->no limitation  Nutrition: 2-->probably inadequate  Friction and Shear: 2-->potential problem  Jens Score: 17    Labs:   Recent Labs   Lab 02/07/20  0511 02/06/20  2333   ALBUMIN 2.9* 3.2*   HGB 13.6 14.0   INR  --  1.32*   WBC 7.0 9.0   A1C  --  7.2*       Physical Exam  Skin inspection: focused buttock and coccyx    Wound Location:  Back/ buttock    Date of last Photo 2/7  Wound History: POA, normal finding per patient.      Interventions  Current support surface: Standard  Low air loss mattress  Current off-loading measures: Pillows  Repositioning aid: Pillows  Visual inspection of wound(s) completed   Wound Care: was done per plan of care.  Supplies: none  Educated provided: None needed  Education provided to: patient  and nurse  Discussed  importance of:their role in pressure injury prevention  Discussed plan of care with Patient and Nurse    Antonette Dan RN CWOCN

## 2020-02-07 NOTE — PLAN OF CARE
ICU End of Shift Summary. See flowsheets for vital signs and detailed assessment.    Changes this shift:   - straight rate of amio and milrinone throughout the night--soft BP, 70/50s with MAP 55-80s. CARDS team aware--no changes, continue to monitor.  - Bumex given in addition to lasix last night due to low UOP (250 mL total overnight)  - troponin elevated, MD aware. EKG done--ST with PVCs. No chest pain. Does complain of aches and discomfort from chest compressions.     Plan:    - discussion on placement of ICD?  - adjust meds? Clarify BP and MAP goals.     Continue with plan of care

## 2020-02-07 NOTE — PROGRESS NOTES
Critical Care ICU Note - Cardiology  Gucci Tomas M.D.    Impression:  Recent surgery  Cardiogenic shock  Acute and chronic congestive heart failure  Acute respiratory failure  Acute renal failure    The patient remains unstable in the ICU with on-going need for ventilator support, parenteral medications for the adjustment of blood pressure and cardiac output and maintenance of renal function.      The patient is seen for  shock requiring vasopressor and or inotropic agents; low cardiac output necessitating  inotropic agents, vasopressors ; acute renal failure requiring fluid and diuretic management; out of hospital cardiac arrest.      I personally reviewed:    Arterial and venous blood gases to assess acid base balance, oxygenation, and ventilator settings.    I personally supervised the insertion of catheters for hemodynamic monitoring  Hemodynamic parameters obtained by central hemodynamic monitoring including RAP, estimated LVEDP, pulmonary artery pressure, cardiac output and vascular resistances in order to adjust fluids and infused medications for blood pressure and cardiac output maintenance.      Volume status, renal function and nutritional support as judged by intake, output, daily weight and appropriate laboratories.    I reviewed individual and serial imaging studies including echocardiogram, chest x-ray, CT scan    I personally supervised the prescription of parenteral fluids, inotropes, vasodilators , and vasopressors in order to correct cardiac output, maintain urine output and renal function.    I personally met with patient or family to discuss current and future diagnostic and therapeutic strategies          Problem List  1. Cardiogenic shock  2. Out of hospital cardiac arrest  3. Diabetes  4. Diverticulitis  5. Pancreatitis  6. Diarrhea  7. Depression  8. History of gout  9. CKD 4-5  10. Elevated body mass index    History  No events overnight. Nursing notes reviewed. History reviewed with  "patient and family and is outlined in admission history.  Patient on home milrinone with increasing heart failure.    Objective  BP (!) 72/52 (BP Location: Left arm)   Pulse 101   Temp 97.6  F (36.4  C) (Oral)   Resp 25   Ht 1.702 m (5' 7\")   Wt 110.7 kg (244 lb 0.8 oz)   SpO2 95%   BMI 38.22 kg/m      Intake/Output Summary (Last 24 hours) at 2/7/2020 1025  Last data filed at 2/7/2020 0800  Gross per 24 hour   Intake 899 ml   Output 250 ml   Net 649 ml     Wt Readings from Last 5 Encounters:   02/07/20 110.7 kg (244 lb 0.8 oz)       Meds    allopurinol  200 mg Oral Daily     aspirin  81 mg Oral Daily     atorvastatin  20 mg Oral QPM     co-enzyme Q-10  200 mg Oral Daily     FLUoxetine  20 mg Oral Daily     heparin ANTICOAGULANT  5,000 Units Subcutaneous Q12H     insulin aspart  1-7 Units Subcutaneous TID AC     insulin aspart  1-5 Units Subcutaneous At Bedtime     insulin glargine  10 Units Subcutaneous At Bedtime     magnesium oxide  400 mg Oral Daily     omeprazole  20 mg Oral QAM AC     sodium chloride (PF)  3 mL Intracatheter Q8H       Labs  CMP  Recent Labs   Lab 02/07/20  0511 02/06/20  2333 02/06/20  2253    136  --    POTASSIUM 4.2 4.2  --    CHLORIDE 108 107  --    CO2 17* 18*  --    ANIONGAP 11 11  --    * 207* 214*   BUN 39* 39*  --    CR 1.94* 1.73*  --    GFRESTIMATED 35* 40*  --    GFRESTBLACK 40* 46*  --    CALOS 8.3* 8.4*  --    MAG  --  1.9  --    PHOS  --  3.9  --    PROTTOTAL 7.0 7.5  --    ALBUMIN 2.9* 3.2*  --    BILITOTAL 0.9 1.2  --    ALKPHOS 156* 175*  --    AST 49* 29  --    ALT 43 32  --      CBC  Recent Labs   Lab 02/07/20  0511 02/06/20  2333   WBC 7.0 9.0   RBC 4.81 5.08   HGB 13.6 14.0   HCT 41.5 44.4   MCV 86 87   MCH 28.3 27.6   MCHC 32.8 31.5   RDW 18.3* 18.6*    268     INR  Recent Labs   Lab 02/06/20  2333   INR 1.32*     Arterial Blood Gas  Recent Labs   Lab 02/06/20  2253   PH 7.39   PCO2 29*   PO2 76*   HCO3 18*   O2PER 21  21       Imaging   Name: " WOLFGANG LEACH  Study Date: 2020  MRN: [id number]  : 1955  Gender: M  Account Number: [id number]  Reason for Study: Assess Ejection Fracion  Patient Location: [address]  Height: 170.2cm  Weight: 103.9kg  Patient Header: BSA: 2.14 m2  Study Location: [facility]      Interp Summary Global left ventricular systolic function is reduced.  Ejection Fraction = 15-20%.  Global hypokinesis is noted.  Reduced RV function.  The left ventricle is mildly dilated.  The left atrium is mildly enlarged.  The right ventricle is mildly dilated.  The right atrium is mildly dilated.     Procedures The study performed was a(n) limited 2-D echo.  The study was performed by [facility].  The study quality was diagnostic.  The study was performed in the patient's room.     Left Ventricle The left ventricle is mildly dilated.     Right Ventricle The right ventricle is mildly dilated.  Reduced RV function.     Left Ventricle There is normal left ventricular wall thickness.  Global left ventricular systolic function is reduced.  Ejection Fraction = 15-20%.     Atria The left atrium is mildly enlarged.  The right atrium is mildly dilated.     Aortic Valve The aortic valve is trileaflet.  Moderate aortic valve sclerosis without significant stenosis.     Mitral Valve The mitral valve is normal in structure.     Tricuspid Valve The tricuspid valve is normal in structure.     Pulmonary Valve The pulmonary valve is poorly visualized.     Aorta and PA The aortic root is normal in size.     Pericardial Pleural Effusion Pericardium without effusion.     Left Ventricle Global hypokinesis is noted.     MMODE 2D MEASUREMENTS CALCULATIONS IVSd: 0.7cm  IVSs: 0.7cm  LVIDd: 5.8cm  LVIDs: 5.7cm  LVPWd: 0.7cm  LVPWs: 0.8cm  EDV(MOD-sp4): 169ml  ESV(MOD-sp4): 145ml  EF(MOD-sp4): 14.2%  LVLd ap2: 10cm  EDV(MOD-sp2): 178ml  ESV(MOD-sp2): 147        Assessment/Plan     The patient is electrically stable but will require  implantable rhythm management system.  Parenteral inotrope and vasopressor review and central hemodynamic monitoring implemented.  Advanced heart failure options discussed.    Renal consult, hemodynamic monitoring, LVAD/transplant evaluation.      60 minutes

## 2020-02-07 NOTE — PROVIDER NOTIFICATION
"D:CARDS II team paged due to right brachial blood pressure not reading correctly. Left blood pressure cuff pressure also less than goal of 80/50, and map 55-60.   I: RN paged the team about current blood pressures.   A:     NEURO: Pt is alert and oriented. C/o headache pain.  CV: Pt is sr/st with hr  with occasional PVCs and PAC's. Blood pressure less than goal rate at the start of shift SBP=79/55 with map 55-60. Goal SBP > 80/60's map > 60. Dr Delarosa from [phone] was paged about hypotension during the shift.  RESPIRATORY: Pt placed on 2 l nc of oxygen @ the start of shift  Lungs diminished at the bases.  GI: Pt continues to be NPO except for ice chips Pt is ble to take his medications with no issues  : Pt received a dose of Bumex overnight, with 250 cc of urine output, was able to void 250 ml for the shift, but has yet to void this am.Pt had not voided since am, Dr Cantu informed of this. Pt given 4 mg of  Of bumex and the team wants to just continue to watch. Pt bladder scann came back as 13 ml so no straight cath required. Team aware.   PAIN: Pt c/o having a headache at 07:00, Tylenol given for the headache @ 10:00 headache was\"still there, but not as intense\"  SKIN: Pt with discolored intact coccyx which per pt is \"my normal\" WOC RN saw patient to look at shin integrity today.  GTTS: Pt with  Milrinone running @ 0.5 mcg/kgmin and Amiodarone gtt @ 1 mg/min. Dopamine @ 3 mcg/kg/min   LINES: right brachial forarm arterial line-dampened and not working correctly, Right SL PICC, 2 L PIVs  Procedures: Pt went down for a balloon pump placement at 18:00 due to low CI of 1.4. Pt to return back to the cardiovascular unit when done.  P: Pt to get a swan-yogesh catheter placed today for treatment of chf        "

## 2020-02-07 NOTE — H&P
Cardiology History and Physical  Patient Name: Eliseo Tanner  Age: 64 year old  YOB: 1955  MRN# [id number]  Date of Admission:2/6/2020  Date of Service: 2/6/2020         Assessment and Plan:   Mr Eliseo Tanner is a 65yo M w/PMHx notable for chronic systolic heart failure, NICM, moderate CAD, HTN, ABHINAV on CPAP, DM2, CKDIII who is transferred to [facility] for evaluation and management of advanced heart failure with arrhythmia.     Moderate CAD  Severe Mitral regurgitation  Chronic nonischemic systolic heart failure w/ reduced EF (15-20%) and exacerbation  NYHA Class III. Echo 1/9/2020: LVEF 15-20%; LVEDd 5.9 cm; 4+ MR. [facility] 11/2019 w/ nonobstructive CAD w/ severe branch disease. Presented with increased wt gain, SOB, LE edema concerning for HF exacerbation, initially concerning for cardiogenic shock. Admitted to [facility] for further evaluation regarding need for advanced HF therapies.   -Telemetry  -Lactic acid, VBG ordered; trend  -Beta-blocker: None due to decompensated HF  -ACEi/ARB/ARNI: Entresto  mg BID  -Aldosterone antagonist: Holding spironolactone until renal assessed   -Volume status: Hypervolemic on exam. Weight on admit 240. Baseline weight 225-230.  -Diuretic regimen: Lasix 80mg once (home regimen recently increased from 20->40mg BID)  -Continue milrinone 0.5 mcg/kg/min, Amiodarone @ 1  -Continue ASA 81, atorvastatin 20mg    VFib requiring shock  Shocked x 3 prior to transfer, loaded with amio. No known prior history. Suspect related to underlying HF.   -Keep K>4, Mg>2  -Amio @ 1  -NPO at midnight for consideration of ICD placement     CKDIII  Cr on admission 1.7, baseline Cr 1.5-2.   -Trend Cr  -Daily fluid balance    Diabetes Mellitus Type II  Last A1c 7.2% 2/6/20  Home regimen includes: 15U basaglar at bedtime, glipizide 2.5  -Will resume home glargine at 30% reduction, 10U QHS  -Holding home oral regimen while renal function and/or hemodynamic status is either impaired or  threatened  -Preprandial blood glucose target <140 mg/dL and random <180mg/dl, or 140-180 mg/dL for critically ill  -SSI insulin, q4h blood sugar checks, hypoglycemia protocol ordered    Chronic:  ABHINAV: Home CPAP  Gout: PTA allopurinol 200 daily  MDD: PTA fluoxetine  GERD: PTA PPI  COPD: albuterol PRN    FEN: NPO at midnight  Ppx: Heparin  Dispo: Anticipate will return back to prior living situation wihout any additional needs  CODE: Full    Patient will be staffed in the AM.    Tanesha Harris MD, MPH  Internal Medicine PGY-2  [facility]         Chief Complaint:   SOB, fatigue, wt gain         HPI:   Mr Eliseo Tanner is a 65yo M w/PMHx notable for chronic systolic heart failure, NICM, moderate CAD, HTN, ABHINAV on CPAP, DM2, CKDIII who is transferred to [facility] for HF work up.    He was recently admitted 1/29-1/31 with HF exacerbation and found to be in cardiogenic shock with BERNARDA and worsening LFTs. He was discharged with home milrinone but arrived to Cardiology for heart failure follow up 2/6/20 w/ complaints of weight gain (~4lbs), SOB and worsening LE. He also had some palpitations, tremor, and early satiety. He reports adherence to his medicines, including recent increase in home diuretics.     Prior to arrival to floor he was noted to be in Vfib and required shock x 3. He was loaded with amiodarone, ASA 324mg.     Currently he denies any symptoms of chest pain, palpitations, SOB. +bloating but no abdominal pain, nausea or vomiting.          Past Medical History:   No past medical history on file. None other than listed above         Past Surgical History:   No past surgical history on file.         Social History:     Social History     Socioeconomic History     Marital status:      Spouse name: Not on file     Number of children: Not on file     Years of education: Not on file     Highest education level: Not on file   Occupational History     Not on file   Social Needs     Financial resource  strain: Not on file     Food insecurity:     Worry: Not on file     Inability: Not on file     Transportation needs:     Medical: Not on file     Non-medical: Not on file   Tobacco Use     Smoking status: Not on file   Substance and Sexual Activity     Alcohol use: Not on file     Drug use: Not on file     Sexual activity: Not on file   Lifestyle     Physical activity:     Days per week: Not on file     Minutes per session: Not on file     Stress: Not on file   Relationships     Social connections:     Talks on phone: Not on file     Gets together: Not on file     Attends Buddhism service: Not on file     Active member of club or organization: Not on file     Attends meetings of clubs or organizations: Not on file     Relationship status: Not on file     Intimate partner violence:     Fear of current or ex partner: Not on file     Emotionally abused: Not on file     Physically abused: Not on file     Forced sexual activity: Not on file   Other Topics Concern     Not on file   Social History Narrative     Not on file            Family History:   No family history on file.         Immunizations:     There is no immunization history on file for this patient.           Allergies:     Allergies   Allergen Reactions     Oxycodone Itching and Other (See Comments)          Medications:     Prior to Admission Medications   Prescriptions Last Dose Informant Patient Reported? Taking?   FLUoxetine (PROZAC) 20 MG capsule   Yes No   Sig: Take 20 mg by mouth daily   MULTIPLE MINERALS-VITAMINS PO   Yes No   Sig: Take 1 tablet by mouth daily   acetaminophen (TYLENOL) 500 MG tablet   Yes No   Sig: Take 1,000 mg by mouth daily as needed   albuterol (PROAIR HFA/PROVENTIL HFA/VENTOLIN HFA) 108 (90 Base) MCG/ACT inhaler   Yes No   Sig: Inhale 2 puffs into the lungs every 4 hours as needed   allopurinol (ZYLOPRIM) 100 MG tablet   Yes No   Sig: Take 200 mg by mouth daily   aspirin 81 MG EC tablet   Yes No   Sig: Take 81 mg by mouth daily  "  atorvastatin (LIPITOR) 20 MG tablet   Yes No   Sig: Take 20 mg by mouth daily   coenzyme Q-10 200 MG CAPS   Yes No   Sig: Take 1 capsule by mouth daily   furosemide (LASIX) 40 MG tablet   Yes No   Sig: Take 40 mg by mouth daily   glipiZIDE (GLUCOTROL XL) 2.5 MG 24 hr tablet   Yes No   Sig: Take 2.5 mg by mouth daily   insulin glargine (BASAGLAR KWIKPEN) 100 UNIT/ML pen   Yes No   Sig: Inject 15 Units Subcutaneous At Bedtime   insulin pen needle (BD JAIME U/F) 32G X 4 MM miscellaneous   Yes No   magnesium oxide (MAG-OX) 400 MG tablet   Yes No   Sig: Take 400 mg by mouth daily   omeprazole (PRILOSEC) 20 MG DR capsule   Yes No   Sig: Take 20 mg by mouth every morning   ondansetron (ZOFRAN) 4 MG tablet   Yes No   Sig: Take 4 mg by mouth every 4 hours as needed for nausea   sacubitril-valsartan (ENTRESTO)  MG per tablet   Yes No   Sig: Take 1 tablet by mouth 2 times daily   spironolactone (ALDACTONE) 25 MG tablet   Yes No   Sig: Take 25 mg by mouth daily      Facility-Administered Medications: None           Review of Systems:   A complete, 10 point ROS was performed and is negative other than what is stated in the HPI.         Physical Exam:   Blood pressure (!) 118/91, temperature 97.6  F (36.4  C), temperature source Oral, resp. rate 20, height 1.702 m (5' 7\"), weight 108.9 kg (240 lb 1.3 oz), SpO2 94 %.     Estimated Dry Weight - 225-230    General:  Alert, lying in bed, no acute distress.  HEENT: MMM, EOMI, PERRL, anicteric sclera, no cervical or submandibular LAD  CV: Regular rate, regular rhythm. S1, S2+. Soft holosystolic murmur. JVD 8cm.  Resp: Minimal bibasilar crackles, decreased breath sounds  Abdomen: Bowel sounds +, nondistended, soft, nontender, no hepatosplenomegaly, no masses.  Extremities: 2+ pitting edema  Skin: Scattered bruises  Neuro: Alert, oriented x 3, symmetric facial expressions, moving extremities equally  Psych: Appropriate affect.         Data:     2/6/20 EKG: LAD, PVCs, PACs    Echo " 1/9/2020: LVEF 15-20%; LVEDd 5.9 cm; 4+ MR  Global left ventricular systolic function is reduced.  Ejection Fraction = 15-20%.  Global hypokinesis is noted.  The left ventricle is mildly dilated.  The left atrium is mildly enlarged.  Mild aortic regurgitation.  There is severe (+4) mitral regurgitation.   Mild to moderate tricuspid regurgitation by color Doppler.   Mild pulmonic valvular insufficiency.     11/5/2019 [facility]  LV FINDINGS:  LV Pressure Only, No Angiogram, LVEF not assessed.  DOMINANCE:  Right  LMCA:  20 % Lesion Distal - Left Main  LAD:  20 % Lesion Proximal - LAD  RAMUS:  Patent - Ramus  DIAGONAL 1:  60-70 % Lesion Proximal - 1st Diagonal  CIRCUMFLEX:  30 % Lesion Proximal - Circumflex  OM 1:  Patent - 1st OM  OM 2:  Patent - 2nd OM  RCA:  40 % Lesion Proximal - RCA

## 2020-02-07 NOTE — PROGRESS NOTES
[facility] Cardiology Progress Note      Faculty Attestation  Gucci Tomas M.D.    I personally saw and examined this patient, reviewed recent laboratories and imaging studies, discussed the case with the housestaff, and conveyed plan to the patient.  I answered all questions from patient and/or family. I agree with the examination, assessment and plan outlined here.             Assessment and Plan:   64 year old with a PMHx of NICM (LVEF around 15%) on Home Inotropes, Chronic Kidney Disease  (BL Cr around 1.4 to 1.5), ABHINAV, DM2 on Insulin who presents from [place] due to worsening CHF symptoms despite home milrinone.     Patient was initially started on home milrinone late last year because of worsening heart function. He improved from a symptom standpoint and there was an attempt to wean milrinone; however, patient's Cr worsened. Since he failed a wean of milrinone, he was referred to [facility] for consideration of further advanced therapies.     #Non-Ischemic Cardiomyopathy on Home Milrinone   -Continue Milrinone 0.5 mcg/kg/min. Will hold Entresto due to soft blood pressures.   -Will place SG Cath to assess filling pressures and cardiac index.  -He will likely need a LVAD/OHTx evaluation during this admission. Therefore, we will request for financial clearance. He has excellent family support, does not have life-limiting co-morbid conditions, and does not smoke/drink. Therefore, he will likely be a candidate for both therapies. Will obtain TTE to assess his RV function and SG Cath will assess his PVR. This will give us a better idea if his physiology would tolerate one device versus the other.    -Will guide further therapy after SG cath has been placed.     #Acute Kidney Injury (Baseline Cr is around 1.4)   -Given that patient's CVP is elevated, would believe this is due to cardiorenal syndrome. Will assess right sided filling pressures and CI with swan today.  -Once swan is placed, we will likely place  patient on Bumex gtt.     #Ventricular Tachycardia   -Patient had an episode of VT during transport to [facility]. He does not have an ICD currently. He was given a shock and started on Amio.  -Continue Amio gtt at 0.5 today. Transition to 200mg BID tomorrow.  -No BB at this time due to decompensated heart failure and patient was not on BB at home (likely due to progressive CHF and need for milrinone).   -Will consult EP next week to place ICD prior to eventual discharge.     #DM2  -Continue Insulin     #Coronary Artery Disease   -Continue Aspirin and Statin.       Jemal Cantu PGY-6   Cardiology Fellow   Pager: [phone]      Patient was seen by and the above plan discussed with Dr. Tomas.     Subjective:     No acute events overnight           Review of Systems:   A comprehensive review of systems was performed and found to be negative except as described in this note          Medications:     Current Facility-Administered Medications   Medication     acetaminophen (TYLENOL) tablet 650 mg     albuterol (PROAIR HFA/PROVENTIL HFA/VENTOLIN HFA) 108 (90 Base) MCG/ACT inhaler 2 puff     allopurinol (ZYLOPRIM) tablet 200 mg     amiodarone (NEXTERONE) 250 mg in D5W 250 mL infusion     aspirin (ASA) chewable tablet 81 mg     atorvastatin (LIPITOR) tablet 20 mg     co-enzyme Q-10 capsule 200 mg     glucose gel 15-30 g    Or     dextrose 50 % injection 25-50 mL    Or     glucagon injection 1 mg     FLUoxetine (PROzac) capsule 20 mg     heparin ANTICOAGULANT injection 5,000 Units     insulin aspart (NovoLOG) inj (RAPID ACTING)     insulin aspart (NovoLOG) inj (RAPID ACTING)     insulin glargine (LANTUS PEN) injection 10 Units     lidocaine (LMX4) cream     lidocaine 1 % 0.1-1 mL     magnesium oxide (MAG-OX) tablet 400 mg     magnesium sulfate 2 g in water intermittent infusion     magnesium sulfate 4 g in 100 mL sterile water (premade)     medication instruction     milrinone (PRIMACOR) infusion 200 mcg/mL PREMIX      omeprazole (priLOSEC) CR capsule 20 mg     potassium chloride (KLOR-CON) Packet 20-40 mEq     potassium chloride 10 mEq in 100 mL intermittent infusion with 10 mg lidocaine     potassium chloride 10 mEq in 100 mL sterile water intermittent infusion (premix)     potassium chloride 20 mEq in 50 mL intermittent infusion     potassium chloride ER (K-DUR/KLOR-CON M) CR tablet 20-40 mEq     potassium phosphate 10 mmol in D5W 250 mL intermittent infusion     potassium phosphate 15 mmol in D5W 250 mL intermittent infusion     potassium phosphate 20 mmol in D5W 250 mL intermittent infusion     potassium phosphate 20 mmol in D5W 500 mL intermittent infusion     potassium phosphate 25 mmol in D5W 500 mL intermittent infusion     sodium chloride (PF) 0.9% PF flush 3 mL     sodium chloride (PF) 0.9% PF flush 3 mL               Objective:   Temp: 97.6  F (36.4  C) Temp src: Oral BP: (!) 72/52 Pulse: 101 Heart Rate: 101 Resp: 25 SpO2: 95 % O2 Device: Nasal cannula Oxygen Delivery: 2 LPM    General: No acute distress   HEENT: NC/AT   CV: RRR, +S1/S2, No murmurs, rubs, gallops. JVP is around 15.   Pulm: Unlabored breathing, CTAB   GI: s/nt/nd   Ext: No edema   Neuro: No focal defects   Psych: Normal Affect            Data:     Lab Results   Component Value Date    WBC 7.0 02/07/2020    HGB 13.6 02/07/2020    HCT 41.5 02/07/2020     02/07/2020     02/07/2020    POTASSIUM 4.2 02/07/2020    CHLORIDE 108 02/07/2020    CO2 17 (L) 02/07/2020    BUN 39 (H) 02/07/2020    CR 1.94 (H) 02/07/2020     (H) 02/07/2020    TROPI 0.155 (HH) 02/07/2020    AST 49 (H) 02/07/2020    ALT 43 02/07/2020    ALKPHOS 156 (H) 02/07/2020    BILITOTAL 0.9 02/07/2020    INR 1.32 (H) 02/06/2020     Right Heart Catherization 1/15/2020   RA  28/28  (26)  SV    16:10:05  RV  69/19,  31      16:10:33  PA  66/40  (51)  PA    16:15:38  PW  39/41  (42)  PV    16:16:03  PW  32/38  (38)      16:16:07    Coronary Angiogram:   LV FINDINGS:  LV Pressure  Only, No Angiogram, LVEF not assessed.  DOMINANCE:  Right  LMCA:  20 % Lesion Distal - Left Main  LAD:  20 % Lesion Proximal - LAD  RAMUS:  Patent - Ramus  DIAGONAL 1:  60-70 % Lesion Proximal - 1st Diagonal  CIRCUMFLEX:  30 % Lesion Proximal - Circumflex  OM 1:  Patent - 1st OM  OM 2:  Patent - 2nd OM  RCA:  40 % Lesion Proximal - RCA    All laboratory data reviewed

## 2020-02-07 NOTE — PROGRESS NOTES
Admitted/transferred from: OSH  Reason for admission/transfer: VF arrest  Patient status upon admission/transfer: Stable in sinus tach  Interventions: Continue amiodarone already running & home milrinone changed to hospital setup. EKG , CXR & labs done  Plan:  Monitor rhythm and labs  2 RN skin assessment: completed by Todd Gomez RN-BC & Lance Denny. RN  Result of skin assessment Reddened rectal & keyla- area from loose stool  Interventions/actions: Critic aid paste  Height, weight, drug calc weight: done  Patient belongings: in Pt Room  MDRO education (if applicable): N/A.

## 2020-02-08 ENCOUNTER — APPOINTMENT (OUTPATIENT)
Dept: ULTRASOUND IMAGING | Facility: CLINIC | Age: 65
DRG: 001 | End: 2020-02-08
Attending: INTERNAL MEDICINE
Payer: MEDICARE

## 2020-02-08 ENCOUNTER — APPOINTMENT (OUTPATIENT)
Dept: CT IMAGING | Facility: CLINIC | Age: 65
DRG: 001 | End: 2020-02-08
Attending: INTERNAL MEDICINE
Payer: MEDICARE

## 2020-02-08 LAB
ABO + RH BLD: NORMAL
ABO + RH BLD: NORMAL
ALBUMIN SERPL-MCNC: 3.1 G/DL (ref 3.4–5)
ALP SERPL-CCNC: 157 U/L (ref 40–150)
ALT SERPL W P-5'-P-CCNC: 74 U/L (ref 0–70)
ANION GAP SERPL CALCULATED.3IONS-SCNC: 5 MMOL/L (ref 3–14)
ANION GAP SERPL CALCULATED.3IONS-SCNC: 8 MMOL/L (ref 3–14)
APTT PPP: 32 SEC (ref 22–37)
AST SERPL W P-5'-P-CCNC: 78 U/L (ref 0–45)
BASE DEFICIT BLDV-SCNC: 0.4 MMOL/L
BASE DEFICIT BLDV-SCNC: 1.2 MMOL/L
BASE DEFICIT BLDV-SCNC: 1.9 MMOL/L
BASE DEFICIT BLDV-SCNC: 2.2 MMOL/L
BASE DEFICIT BLDV-SCNC: 2.9 MMOL/L
BASE DEFICIT BLDV-SCNC: 3.1 MMOL/L
BASOPHILS # BLD AUTO: 0 10E9/L (ref 0–0.2)
BASOPHILS NFR BLD AUTO: 0.6 %
BILIRUB SERPL-MCNC: 0.7 MG/DL (ref 0.2–1.3)
BLD GP AB SCN SERPL QL: NORMAL
BLOOD BANK CMNT PATIENT-IMP: NORMAL
BUN SERPL-MCNC: 48 MG/DL (ref 7–30)
BUN SERPL-MCNC: 50 MG/DL (ref 7–30)
CA-I BLD-MCNC: 4.6 MG/DL (ref 4.4–5.2)
CALCIUM SERPL-MCNC: 8.2 MG/DL (ref 8.5–10.1)
CALCIUM SERPL-MCNC: 8.5 MG/DL (ref 8.5–10.1)
CHLORIDE SERPL-SCNC: 100 MMOL/L (ref 94–109)
CHLORIDE SERPL-SCNC: 103 MMOL/L (ref 94–109)
CHOLEST SERPL-MCNC: 118 MG/DL
CMV IGG SERPL QL IA: 7.8 AI (ref 0–0.8)
CO2 SERPL-SCNC: 23 MMOL/L (ref 20–32)
CO2 SERPL-SCNC: 27 MMOL/L (ref 20–32)
CREAT SERPL-MCNC: 2.55 MG/DL (ref 0.66–1.25)
CREAT SERPL-MCNC: 2.81 MG/DL (ref 0.66–1.25)
CRP SERPL-MCNC: 21 MG/L (ref 0–8)
D DIMER PPP FEU-MCNC: 15.6 UG/ML FEU (ref 0–0.5)
DIFFERENTIAL METHOD BLD: ABNORMAL
EBV EA-D IGG SER-ACNC: <0.2 AI (ref 0–0.8)
EOSINOPHIL # BLD AUTO: 0.2 10E9/L (ref 0–0.7)
EOSINOPHIL NFR BLD AUTO: 3 %
ERYTHROCYTE [DISTWIDTH] IN BLOOD BY AUTOMATED COUNT: 17.6 % (ref 10–15)
ERYTHROCYTE [DISTWIDTH] IN BLOOD BY AUTOMATED COUNT: 17.9 % (ref 10–15)
ETHANOL UR QL SCN: NEGATIVE
FERRITIN SERPL-MCNC: 189 NG/ML (ref 26–388)
GFR SERPL CREATININE-BSD FRML MDRD: 22 ML/MIN/{1.73_M2}
GFR SERPL CREATININE-BSD FRML MDRD: 25 ML/MIN/{1.73_M2}
GLUCOSE BLDC GLUCOMTR-MCNC: 140 MG/DL (ref 70–99)
GLUCOSE BLDC GLUCOMTR-MCNC: 159 MG/DL (ref 70–99)
GLUCOSE BLDC GLUCOMTR-MCNC: 160 MG/DL (ref 70–99)
GLUCOSE BLDC GLUCOMTR-MCNC: 221 MG/DL (ref 70–99)
GLUCOSE SERPL-MCNC: 147 MG/DL (ref 70–99)
GLUCOSE SERPL-MCNC: 155 MG/DL (ref 70–99)
HBA1C MFR BLD: 7.3 % (ref 0–5.6)
HCO3 BLDV-SCNC: 23 MMOL/L (ref 21–28)
HCO3 BLDV-SCNC: 24 MMOL/L (ref 21–28)
HCO3 BLDV-SCNC: 25 MMOL/L (ref 21–28)
HCO3 BLDV-SCNC: 26 MMOL/L (ref 21–28)
HCT VFR BLD AUTO: 40.2 % (ref 40–53)
HCT VFR BLD AUTO: 40.5 % (ref 40–53)
HDLC SERPL-MCNC: 71 MG/DL
HGB BLD-MCNC: 13 G/DL (ref 13.3–17.7)
HGB BLD-MCNC: 13.2 G/DL (ref 13.3–17.7)
IMM GRANULOCYTES # BLD: 0 10E9/L (ref 0–0.4)
IMM GRANULOCYTES NFR BLD: 0.6 %
INR PPP: 1.33 (ref 0.86–1.14)
IRON SATN MFR SERPL: 8 % (ref 15–46)
IRON SERPL-MCNC: 25 UG/DL (ref 35–180)
LACTATE BLD-SCNC: 0.6 MMOL/L (ref 0.7–2)
LACTATE BLD-SCNC: 0.9 MMOL/L (ref 0.7–2)
LDH SERPL L TO P-CCNC: 363 U/L (ref 85–227)
LDLC SERPL CALC-MCNC: 35 MG/DL
LMWH PPP CHRO-ACNC: <0.1 IU/ML
LYMPHOCYTES # BLD AUTO: 1.3 10E9/L (ref 0.8–5.3)
LYMPHOCYTES NFR BLD AUTO: 17.5 %
MCH RBC QN AUTO: 28.1 PG (ref 26.5–33)
MCH RBC QN AUTO: 28.4 PG (ref 26.5–33)
MCHC RBC AUTO-ENTMCNC: 32.1 G/DL (ref 31.5–36.5)
MCHC RBC AUTO-ENTMCNC: 32.8 G/DL (ref 31.5–36.5)
MCV RBC AUTO: 87 FL (ref 78–100)
MCV RBC AUTO: 88 FL (ref 78–100)
MONOCYTES # BLD AUTO: 0.9 10E9/L (ref 0–1.3)
MONOCYTES NFR BLD AUTO: 12 %
NEUTROPHILS # BLD AUTO: 4.8 10E9/L (ref 1.6–8.3)
NEUTROPHILS NFR BLD AUTO: 66.3 %
NONHDLC SERPL-MCNC: 47 MG/DL
NRBC # BLD AUTO: 0 10*3/UL
NRBC BLD AUTO-RTO: 0 /100
NT-PROBNP SERPL-MCNC: ABNORMAL PG/ML (ref 0–900)
O2/TOTAL GAS SETTING VFR VENT: ABNORMAL %
O2/TOTAL GAS SETTING VFR VENT: ABNORMAL %
O2/TOTAL GAS SETTING VFR VENT: NORMAL %
OXYHGB MFR BLDV: 51 %
OXYHGB MFR BLDV: 53 %
OXYHGB MFR BLDV: 59 %
OXYHGB MFR BLDV: 62 %
OXYHGB MFR BLDV: 63 %
OXYHGB MFR BLDV: 65 %
PCO2 BLDV: 45 MM HG (ref 40–50)
PCO2 BLDV: 46 MM HG (ref 40–50)
PCO2 BLDV: 47 MM HG (ref 40–50)
PCO2 BLDV: 48 MM HG (ref 40–50)
PCO2 BLDV: 49 MM HG (ref 40–50)
PCO2 BLDV: 51 MM HG (ref 40–50)
PH BLDV: 7.31 PH (ref 7.32–7.43)
PH BLDV: 7.32 PH (ref 7.32–7.43)
PH BLDV: 7.33 PH (ref 7.32–7.43)
PH BLDV: 7.33 PH (ref 7.32–7.43)
PHOSPHATE SERPL-MCNC: 5 MG/DL (ref 2.5–4.5)
PLATELET # BLD AUTO: 229 10E9/L (ref 150–450)
PLATELET # BLD AUTO: 238 10E9/L (ref 150–450)
PO2 BLDV: 31 MM HG (ref 25–47)
PO2 BLDV: 34 MM HG (ref 25–47)
PO2 BLDV: 34 MM HG (ref 25–47)
PO2 BLDV: 37 MM HG (ref 25–47)
POTASSIUM SERPL-SCNC: 3.5 MMOL/L (ref 3.4–5.3)
POTASSIUM SERPL-SCNC: 3.8 MMOL/L (ref 3.4–5.3)
PROT SERPL-MCNC: 7.4 G/DL (ref 6.8–8.8)
RBC # BLD AUTO: 4.63 10E12/L (ref 4.4–5.9)
RBC # BLD AUTO: 4.65 10E12/L (ref 4.4–5.9)
SODIUM SERPL-SCNC: 132 MMOL/L (ref 133–144)
SODIUM SERPL-SCNC: 134 MMOL/L (ref 133–144)
SPECIMEN EXP DATE BLD: NORMAL
T PALLIDUM AB SER QL: NONREACTIVE
TIBC SERPL-MCNC: 306 UG/DL (ref 240–430)
TRANSFERRIN SERPL-MCNC: 236 MG/DL (ref 210–360)
TRIGL SERPL-MCNC: 57 MG/DL
TSH SERPL DL<=0.005 MIU/L-ACNC: 1.72 MU/L (ref 0.4–4)
URATE SERPL-MCNC: 7.5 MG/DL (ref 3.5–7.2)
VIT B12 SERPL-MCNC: 752 PG/ML (ref 193–986)
VZV IGG SER QL IA: 2.4 AI (ref 0–0.8)
WBC # BLD AUTO: 7.3 10E9/L (ref 4–11)
WBC # BLD AUTO: 8.1 10E9/L (ref 4–11)

## 2020-02-08 PROCEDURE — 99291 CRITICAL CARE FIRST HOUR: CPT | Mod: 25 | Performed by: INTERNAL MEDICINE

## 2020-02-08 PROCEDURE — G0499 HEPB SCREEN HIGH RISK INDIV: HCPCS | Performed by: INTERNAL MEDICINE

## 2020-02-08 PROCEDURE — 85610 PROTHROMBIN TIME: CPT | Performed by: INTERNAL MEDICINE

## 2020-02-08 PROCEDURE — 84550 ASSAY OF BLOOD/URIC ACID: CPT | Performed by: INTERNAL MEDICINE

## 2020-02-08 PROCEDURE — 40000196 ZZH STATISTIC RAPCV CVP MONITORING

## 2020-02-08 PROCEDURE — 83880 ASSAY OF NATRIURETIC PEPTIDE: CPT | Performed by: INTERNAL MEDICINE

## 2020-02-08 PROCEDURE — 87389 HIV-1 AG W/HIV-1&-2 AB AG IA: CPT | Performed by: INTERNAL MEDICINE

## 2020-02-08 PROCEDURE — 20000004 ZZH R&B ICU UMMC

## 2020-02-08 PROCEDURE — 84134 ASSAY OF PREALBUMIN: CPT | Performed by: INTERNAL MEDICINE

## 2020-02-08 PROCEDURE — 25000128 H RX IP 250 OP 636: Performed by: STUDENT IN AN ORGANIZED HEALTH CARE EDUCATION/TRAINING PROGRAM

## 2020-02-08 PROCEDURE — 25000132 ZZH RX MED GY IP 250 OP 250 PS 637: Mod: GY

## 2020-02-08 PROCEDURE — 86140 C-REACTIVE PROTEIN: CPT | Performed by: INTERNAL MEDICINE

## 2020-02-08 PROCEDURE — 25000128 H RX IP 250 OP 636: Performed by: INTERNAL MEDICINE

## 2020-02-08 PROCEDURE — 83605 ASSAY OF LACTIC ACID: CPT | Performed by: INTERNAL MEDICINE

## 2020-02-08 PROCEDURE — 85730 THROMBOPLASTIN TIME PARTIAL: CPT | Performed by: INTERNAL MEDICINE

## 2020-02-08 PROCEDURE — 86787 VARICELLA-ZOSTER ANTIBODY: CPT | Performed by: INTERNAL MEDICINE

## 2020-02-08 PROCEDURE — 86481 TB AG RESPONSE T-CELL SUSP: CPT | Performed by: INTERNAL MEDICINE

## 2020-02-08 PROCEDURE — 85613 RUSSELL VIPER VENOM DILUTED: CPT | Performed by: INTERNAL MEDICINE

## 2020-02-08 PROCEDURE — 86850 RBC ANTIBODY SCREEN: CPT | Performed by: INTERNAL MEDICINE

## 2020-02-08 PROCEDURE — 83550 IRON BINDING TEST: CPT | Performed by: INTERNAL MEDICINE

## 2020-02-08 PROCEDURE — 40000014 ZZH STATISTIC ARTERIAL MONITORING DAILY

## 2020-02-08 PROCEDURE — G0472 HEP C SCREEN HIGH RISK/OTHER: HCPCS | Performed by: INTERNAL MEDICINE

## 2020-02-08 PROCEDURE — 25800030 ZZH RX IP 258 OP 636: Performed by: STUDENT IN AN ORGANIZED HEALTH CARE EDUCATION/TRAINING PROGRAM

## 2020-02-08 PROCEDURE — 80048 BASIC METABOLIC PNL TOTAL CA: CPT | Performed by: INTERNAL MEDICINE

## 2020-02-08 PROCEDURE — 40000275 ZZH STATISTIC RCP TIME EA 10 MIN

## 2020-02-08 PROCEDURE — 36592 COLLECT BLOOD FROM PICC: CPT | Performed by: INTERNAL MEDICINE

## 2020-02-08 PROCEDURE — 27211427 ZZ H AEROBIKA WITH MANOMETER

## 2020-02-08 PROCEDURE — 25800030 ZZH RX IP 258 OP 636: Performed by: INTERNAL MEDICINE

## 2020-02-08 PROCEDURE — 80061 LIPID PANEL: CPT | Performed by: INTERNAL MEDICINE

## 2020-02-08 PROCEDURE — 82330 ASSAY OF CALCIUM: CPT | Performed by: INTERNAL MEDICINE

## 2020-02-08 PROCEDURE — 85027 COMPLETE CBC AUTOMATED: CPT | Performed by: STUDENT IN AN ORGANIZED HEALTH CARE EDUCATION/TRAINING PROGRAM

## 2020-02-08 PROCEDURE — 00000146 ZZHCL STATISTIC GLUCOSE BY METER IP

## 2020-02-08 PROCEDURE — 86780 TREPONEMA PALLIDUM: CPT | Performed by: INTERNAL MEDICINE

## 2020-02-08 PROCEDURE — 85520 HEPARIN ASSAY: CPT | Performed by: INTERNAL MEDICINE

## 2020-02-08 PROCEDURE — 86901 BLOOD TYPING SEROLOGIC RH(D): CPT | Performed by: INTERNAL MEDICINE

## 2020-02-08 PROCEDURE — 82728 ASSAY OF FERRITIN: CPT | Performed by: INTERNAL MEDICINE

## 2020-02-08 PROCEDURE — 84443 ASSAY THYROID STIM HORMONE: CPT | Performed by: INTERNAL MEDICINE

## 2020-02-08 PROCEDURE — 86777 TOXOPLASMA ANTIBODY: CPT | Performed by: INTERNAL MEDICINE

## 2020-02-08 PROCEDURE — 40000048 ZZH STATISTIC DAILY SWAN MONITORING

## 2020-02-08 PROCEDURE — 70450 CT HEAD/BRAIN W/O DYE: CPT

## 2020-02-08 PROCEDURE — 83036 HEMOGLOBIN GLYCOSYLATED A1C: CPT | Performed by: INTERNAL MEDICINE

## 2020-02-08 PROCEDURE — 86663 EPSTEIN-BARR ANTIBODY: CPT | Performed by: INTERNAL MEDICINE

## 2020-02-08 PROCEDURE — 85520 HEPARIN ASSAY: CPT | Performed by: STUDENT IN AN ORGANIZED HEALTH CARE EDUCATION/TRAINING PROGRAM

## 2020-02-08 PROCEDURE — 93925 LOWER EXTREMITY STUDY: CPT

## 2020-02-08 PROCEDURE — 86905 BLOOD TYPING RBC ANTIGENS: CPT | Performed by: INTERNAL MEDICINE

## 2020-02-08 PROCEDURE — 86706 HEP B SURFACE ANTIBODY: CPT | Performed by: INTERNAL MEDICINE

## 2020-02-08 PROCEDURE — 86644 CMV ANTIBODY: CPT | Performed by: INTERNAL MEDICINE

## 2020-02-08 PROCEDURE — 84100 ASSAY OF PHOSPHORUS: CPT | Performed by: INTERNAL MEDICINE

## 2020-02-08 PROCEDURE — 86900 BLOOD TYPING SEROLOGIC ABO: CPT | Performed by: INTERNAL MEDICINE

## 2020-02-08 PROCEDURE — 85025 COMPLETE CBC W/AUTO DIFF WBC: CPT | Performed by: INTERNAL MEDICINE

## 2020-02-08 PROCEDURE — 25000128 H RX IP 250 OP 636: Performed by: PATHOLOGY

## 2020-02-08 PROCEDURE — 40000281 ZZH STATISTIC TRANSPORT TIME EA 15 MIN

## 2020-02-08 PROCEDURE — 40000076 ZZH STATISTIC IABP MONITORING

## 2020-02-08 PROCEDURE — 25000132 ZZH RX MED GY IP 250 OP 250 PS 637: Mod: GY | Performed by: INTERNAL MEDICINE

## 2020-02-08 PROCEDURE — 00000401 ZZHCL STATISTIC THROMBIN TIME NC: Performed by: INTERNAL MEDICINE

## 2020-02-08 PROCEDURE — 83615 LACTATE (LD) (LDH) ENZYME: CPT | Performed by: INTERNAL MEDICINE

## 2020-02-08 PROCEDURE — 80053 COMPREHEN METABOLIC PANEL: CPT | Performed by: INTERNAL MEDICINE

## 2020-02-08 PROCEDURE — 84466 ASSAY OF TRANSFERRIN: CPT | Performed by: INTERNAL MEDICINE

## 2020-02-08 PROCEDURE — 76700 US EXAM ABDOM COMPLETE: CPT

## 2020-02-08 PROCEDURE — 82805 BLOOD GASES W/O2 SATURATION: CPT | Performed by: INTERNAL MEDICINE

## 2020-02-08 PROCEDURE — 71250 CT THORAX DX C-: CPT

## 2020-02-08 PROCEDURE — 85379 FIBRIN DEGRADATION QUANT: CPT | Performed by: STUDENT IN AN ORGANIZED HEALTH CARE EDUCATION/TRAINING PROGRAM

## 2020-02-08 PROCEDURE — 86704 HEP B CORE ANTIBODY TOTAL: CPT | Performed by: INTERNAL MEDICINE

## 2020-02-08 PROCEDURE — 82607 VITAMIN B-12: CPT | Performed by: INTERNAL MEDICINE

## 2020-02-08 PROCEDURE — 83540 ASSAY OF IRON: CPT | Performed by: INTERNAL MEDICINE

## 2020-02-08 RX ORDER — LANOLIN ALCOHOL/MO/W.PET/CERES
100 CREAM (GRAM) TOPICAL DAILY
Status: DISCONTINUED | OUTPATIENT
Start: 2020-02-08 | End: 2020-02-22

## 2020-02-08 RX ORDER — DOBUTAMINE HCL IN DEXTROSE 5 % 500MG/250
7.5 INTRAVENOUS SOLUTION INTRAVENOUS CONTINUOUS
Status: DISCONTINUED | OUTPATIENT
Start: 2020-02-08 | End: 2020-02-19

## 2020-02-08 RX ORDER — DOBUTAMINE HYDROCHLORIDE 200 MG/100ML
2.5-5 INJECTION INTRAVENOUS CONTINUOUS
Status: DISCONTINUED | OUTPATIENT
Start: 2020-02-08 | End: 2020-02-08

## 2020-02-08 RX ORDER — AMIODARONE HYDROCHLORIDE 200 MG/1
400 TABLET ORAL 2 TIMES DAILY
Status: DISCONTINUED | OUTPATIENT
Start: 2020-02-09 | End: 2020-02-15

## 2020-02-08 RX ORDER — BUMETANIDE 0.25 MG/ML
5 INJECTION INTRAMUSCULAR; INTRAVENOUS ONCE
Status: COMPLETED | OUTPATIENT
Start: 2020-02-08 | End: 2020-02-08

## 2020-02-08 RX ORDER — HEPARIN SODIUM 10000 [USP'U]/100ML
0-3500 INJECTION, SOLUTION INTRAVENOUS CONTINUOUS
Status: DISCONTINUED | OUTPATIENT
Start: 2020-02-08 | End: 2020-02-11

## 2020-02-08 RX ADMIN — AMIODARONE HYDROCHLORIDE 0.5 MG/MIN: 50 INJECTION, SOLUTION INTRAVENOUS at 14:49

## 2020-02-08 RX ADMIN — ASPIRIN 81 MG CHEWABLE TABLET 81 MG: 81 TABLET CHEWABLE at 07:43

## 2020-02-08 RX ADMIN — INSULIN ASPART 1 UNITS: 100 INJECTION, SOLUTION INTRAVENOUS; SUBCUTANEOUS at 21:37

## 2020-02-08 RX ADMIN — ATORVASTATIN CALCIUM 20 MG: 20 TABLET, FILM COATED ORAL at 19:29

## 2020-02-08 RX ADMIN — FLUOXETINE 20 MG: 20 CAPSULE ORAL at 07:43

## 2020-02-08 RX ADMIN — OMEPRAZOLE 20 MG: 20 CAPSULE, DELAYED RELEASE ORAL at 07:02

## 2020-02-08 RX ADMIN — THIAMINE HCL TAB 100 MG 100 MG: 100 TAB at 16:07

## 2020-02-08 RX ADMIN — BUMETANIDE 4 MG: 0.25 INJECTION INTRAMUSCULAR; INTRAVENOUS at 04:04

## 2020-02-08 RX ADMIN — POTASSIUM CHLORIDE 20 MEQ: 750 TABLET, EXTENDED RELEASE ORAL at 18:54

## 2020-02-08 RX ADMIN — DOPAMINE HYDROCHLORIDE IN DEXTROSE 3 MCG/KG/MIN: 1.6 INJECTION, SOLUTION INTRAVENOUS at 14:47

## 2020-02-08 RX ADMIN — CHLOROTHIAZIDE SODIUM 500 MG: 500 INJECTION, POWDER, LYOPHILIZED, FOR SOLUTION INTRAVENOUS at 01:12

## 2020-02-08 RX ADMIN — AMIODARONE HYDROCHLORIDE 0.5 MG/MIN: 50 INJECTION, SOLUTION INTRAVENOUS at 06:00

## 2020-02-08 RX ADMIN — POTASSIUM CHLORIDE 20 MEQ: 750 TABLET, EXTENDED RELEASE ORAL at 07:02

## 2020-02-08 RX ADMIN — MILRINONE LACTATE IN DEXTROSE 0.5 MCG/KG/MIN: 200 INJECTION, SOLUTION INTRAVENOUS at 00:15

## 2020-02-08 RX ADMIN — BUMETANIDE 5 MG: 0.25 INJECTION INTRAMUSCULAR; INTRAVENOUS at 01:12

## 2020-02-08 RX ADMIN — MILRINONE LACTATE IN DEXTROSE 0.25 MCG/KG/MIN: 200 INJECTION, SOLUTION INTRAVENOUS at 14:50

## 2020-02-08 RX ADMIN — BUMETANIDE 4 MG: 0.25 INJECTION INTRAMUSCULAR; INTRAVENOUS at 16:07

## 2020-02-08 RX ADMIN — MILRINONE LACTATE IN DEXTROSE 0.5 MCG/KG/MIN: 200 INJECTION, SOLUTION INTRAVENOUS at 07:02

## 2020-02-08 RX ADMIN — ALLOPURINOL 200 MG: 100 TABLET ORAL at 07:43

## 2020-02-08 RX ADMIN — ACETAMINOPHEN 325 MG: 325 TABLET, FILM COATED ORAL at 03:08

## 2020-02-08 RX ADMIN — DOBUTAMINE 2.5 MCG/KG/MIN: 12.5 INJECTION, SOLUTION INTRAVENOUS at 18:22

## 2020-02-08 RX ADMIN — MAGNESIUM OXIDE 400 MG: 400 TABLET ORAL at 07:43

## 2020-02-08 RX ADMIN — INSULIN GLARGINE 10 UNITS: 100 INJECTION, SOLUTION SUBCUTANEOUS at 21:37

## 2020-02-08 RX ADMIN — Medication 200 MG: at 07:43

## 2020-02-08 RX ADMIN — DOPAMINE HYDROCHLORIDE IN DEXTROSE 3 MCG/KG/MIN: 1.6 INJECTION, SOLUTION INTRAVENOUS at 12:33

## 2020-02-08 RX ADMIN — HEPARIN SODIUM 1200 UNITS/HR: 10000 INJECTION, SOLUTION INTRAVENOUS at 09:23

## 2020-02-08 RX ADMIN — ACETAMINOPHEN 650 MG: 325 TABLET, FILM COATED ORAL at 12:31

## 2020-02-08 ASSESSMENT — PAIN DESCRIPTION - DESCRIPTORS: DESCRIPTORS: HEADACHE

## 2020-02-08 ASSESSMENT — ACTIVITIES OF DAILY LIVING (ADL)
ADLS_ACUITY_SCORE: 14
ADLS_ACUITY_SCORE: 15
ADLS_ACUITY_SCORE: 13
ADLS_ACUITY_SCORE: 14
ADLS_ACUITY_SCORE: 13
ADLS_ACUITY_SCORE: 13

## 2020-02-08 ASSESSMENT — MIFFLIN-ST. JEOR: SCORE: 1840.63

## 2020-02-08 NOTE — PLAN OF CARE
Pt hemodynamically stable this shift. Hemodynamic numbers Q4 hours, as ordered. Urinary catheter placed at start of shift, per circulating RN. Bumex and diuril given, as ordered. Increased UOP after same. No acute events this shift. Continuing to monitor.    Problem: Cardiac Output Decreased  Goal: Effective Cardiac Output  Outcome: Improving

## 2020-02-08 NOTE — PLAN OF CARE
PT 4E: 6 minute walk test orders acknowledged and appreciated. Will perform test on Monday as medically appropriate.

## 2020-02-08 NOTE — PROGRESS NOTES
"SPIRITUAL HEALTH SERVICES  SPIRITUAL ASSESSMENT Progress Note  [facility] ([place] 4E     REFERRAL SOURCE: Initial unit  visit with patient and family, per request for hospital  visit as noted in initial nursing assessment.    Pt/family welcome  support. Pt/spouse shared regarding:     pt's history of illness and events leading up to this hospitalization     that pt will most likely have ICD placed in coming days     that pt will be having LVAD eval     pt is retired, said his favorite hobby is \"riding motorcycles\"    pt and family said they have good support from their [employer], [place] in [facility]  I oriented pt/family to Spiritual Health Services, offered prayer at their request, will continue to follow.  PLAN: continue to follow, will visit again early next week if pt still on unit.    Ad Mccoy) Hernandez Krause M.Div., [facility]  Staff   Pager [phone]      "

## 2020-02-08 NOTE — PROGRESS NOTES
"CLINICAL NUTRITION SERVICES - ASSESSMENT NOTE     Nutrition Prescription    Malnutrition Status:    Patient does not meet two of the established criteria necessary for diagnosing malnutrition    Recommendations already ordered by Registered Dietitian (RD):  -Thiamine (100 mg/day) given pt has NYHA Class III heart failure.      Future/Additional Recommendations:  Monitor PO adequacy.      REASON FOR ASSESSMENT  Eliseo Tanner is a/an 64 year old male assessed by the dietitian for Provider Order - Heart Failure pre VAD planning    Per H&P: \"w/PMHx notable for chronic systolic heart failure, NICM, moderate CAD, HTN, ABHINAV on CPAP, DM2, CKDIII who is transferred to [facility] for evaluation and management of advanced heart failure with arrhythmia.\"     NUTRITION HISTORY  -Per pt and pt wife: Pt eating well PTA despite recently decreased appetite with recent hospitalization. He eats small, frequent meals. Wife works in hospital  and is knowledgeable about food. They follow low sodium diet, watch his CHO intake, and try to get him adequate protein. He recently started Premier Protein shakes when appetite declined.     CURRENT NUTRITION ORDERS  Diet: Low Saturated Fat/2400 mg Sodium  Intake/Tolerance: Pt ate breakfast this morning and was planning on ordering lunch soon after RD visit.    LABS  Labs reviewed  -baseline Cr 1.5-2.   -Hgb A1c 7.2% 2/6/20    MEDICATIONS  Medications reviewed    ANTHROPOMETRICS  Height: 170.2 cm (5' 7\")  Most Recent Weight: 110.2 kg (242 lb 15.2 oz)    IBW: 67.3 kg (164% IBW)  BMI: 38.05 kg/m2; Obesity Grade II BMI 35-39.9  Weight History: No wt loss per pt, other than some wt fluctuations with fluid.  Wt Readings from Last 20 Encounters:   02/08/20 110.2 kg (242 lb 15.2 oz)     Dosing Weight: 78 kg (adjusted, based on most recent/lowest wt of 110.2 kg on 2/7 and IBW of 67.3 kg)    Pre-VAD ASSESSED NUTRITION NEEDS  Estimated Energy Needs: 3252-9857 kcals/day (20 - 25 " kcals/kg)  Justification: Maintenance/obese  Estimated Protein Needs:  grams protein/day (1.1 - 1.4 grams of pro/kg)  Justification: Increased needs 2/2 heart failure  Estimated Fluid Needs: 1 mL/kcal  Justification: Maintenance or Per provider pending fluid status    Post-VAD ASSESSED NUTRITION NEEDS  Estimated Energy Needs: 7658-7341 kcals/day (20 - 25 kcals/kg)  Justification: Maintenance  Estimated Protein Needs: 117-156 grams protein/day (1.5 - 2 grams of pro/kg)  Justification: Increased needs Post-op  Estimated Fluid Needs: 1 mL/kcal   Justification: Maintenance or Per provider pending fluid status    PHYSICAL FINDINGS  See malnutrition section below.  -Some bruising notes to arms/hands (pt on anticoagulant)    MALNUTRITION  % Intake: Decreased intake does not meet criteria  % Weight Loss: None noted - some wt fluctuations pt attributes to fluid  Subcutaneous Fat Loss: None observed  Muscle Loss: None observed  Fluid Accumulation/Edema: None noted  Malnutrition Diagnosis: Patient does not meet two of the established criteria necessary for diagnosing malnutrition    NUTRITION DIAGNOSIS  Predicted inadequate nutrient intake (protein/kcal) related to potential upcoming LVAD and increased needs post-op as evidenced by pt currently eating well and maintaining wt, but potential for appetite to decline post-VAD and post-VAD needs of 117-156 grams protein/day (1.5 - 2 grams of pro/kg).    INTERVENTIONS  Implementation  Nutrition Education: Provided education on RD role, role of NFPE, encouraged continue adequate PO prior to surgery to optimize nutrition status, and reviewed post VAD nutrition: High likelihood of early satiety postop with placement of VAD, requiring small/frequent meals vs temporary feeding tube. Discussed need for high protein intake for healing and how adequate protein intake may be impacted by early satiety. Provided Sources of Protein handout, specifying high needs for Post-VAD.   -Vitamin  supplementation    Goals  Patient to consume % of nutritionally adequate meal trays TID, or the equivalent with supplements/snacks.     Monitoring/Evaluation  Progress toward goals will be monitored and evaluated per protocol.    Kavitha Isabel RD, EDWARDO  Weekend Pager: [phone]

## 2020-02-08 NOTE — PROGRESS NOTES
Cardiology Progress Note  Eliseo Tanner MRN: [id number]  Age: 64 year old, : 1955  Date: 2020          Critical Care ICU Note - Cardiology  Gucci Tomas M.D.    Impression:  Recent surgery  Cardiogenic shock  Acute and chronic congestive heart failure  Acute respiratory failure  Acute renal failure    The patient remains unstable in the ICU with on-going need for ventilator support, parenteral medications for the adjustment of blood pressure and cardiac output and maintenance of renal function.      The patient is seen for shock requiring vasopressor and or inotropic agents; low cardiac output necessitating  inotropic agents, vasopressors and afterload reducing agents; limited mechanical support requiring IABP; acute renal failure requiring fluid and diuretic management; and out of hospital cardiac arrest.    I personally reviewed:    Arterial and venous blood gases to assess acid base balance, oxygenation, and ventilator settings.      Hemodynamic parameters obtained by central hemodynamic monitoring including RAP, estimated LVEDP, pulmonary artery pressure, cardiac output and vascular resistances in order to adjust fluids and infused medications for blood pressure and cardiac output maintenance.    Volume status, renal function and nutritional support as judged by intake, output, daily weight and appropriate laboratories.    I reviewed individual and serial imaging studies including echocardiogram, chest x-ray, CT scan    I personally supervised the prescription of parenteral fluids, inotropes, vasodilators , and vasopressors in order to correct cardiac output, maintain urine output and renal function.    I personally met with patient or family to discuss current and future diagnostic and therapeutic strategies    35 minutes ICU, agree with note below.            Assessment and Plan:     Mr Eliseo Tanner is a 63yo M w/PMHx notable for chronic systolic heart  failure, NICM, moderate CAD, HTN, ABHINAV on CPAP, DM2, CKDIII who is transferred to [facility] for evaluation and management of advanced heart failure with arrhythmia.      Moderate CAD  Severe Mitral regurgitation  Chronic nonischemic systolic heart failure w/ reduced EF (15-20%) and exacerbation  NYHA Class III. Echo 1/9/2020: LVEF 15-20%; LVEDd 5.9 cm; 4+ MR. [facility] 11/2019 w/ nonobstructive CAD w/ severe branch disease. Presented with increased wt gain, SOB, LE edema concerning for HF exacerbation, initially concerning for cardiogenic shock. Admitted to [facility] for further evaluation regarding need for advanced HF therapies.   -Financial approval obtained for LVAD w/u; order set has been placed  -Hensley placed: hemodynamics w/ Jane CO/CI q6h  -Telemetry  -Lactic acid, VBG ordered; trend  -Beta-blocker: None due to decompensated HF  -ACEi/ARB/ARNI: Holding home Entresto  mg BID  -Aldosterone antagonist: Holding spironolactone until renal assessed   -Volume status: Hypervolemic on exam. Weight on admit 240. Baseline weight 225-230.  -Diuretic regimen: for 2/8: bumex IV 4 mg , 5 mg; 500 mg diuril; goal negative 2L over next 24 hrs; need to redose diureitcs in PM  -Continue milrinone 0.5 mcg/kg/min, Amiodarone @ 1  -Continue ASA 81, atorvastatin 20 mg    Precapillary pulmonary hypertension:  Significant transpulmonary gradient of 14 on right heart cath numbers. May be related to CTEPH vs. WHO I.  -Start empiric heparin ggt low intensity  -d-dimer, V/Q scan     VFib requiring shock  Shocked x 3 prior to transfer, loaded with amio. No known prior history. Suspect related to underlying HF.   -Keep K>4, Mg>2  -Amio @ 1  -Nees ICD for secondary prevention     CKDIII  Cr on admission 1.7, baseline Cr 1.5-2. Rising creatinine and marginal UOP  -Trend Cr  -Daily fluid balance     Diabetes Mellitus Type II  Last A1c 7.2% 2/6/20  Home regimen includes: 15U basaglar at bedtime, glipizide 2.5  -Will resume home glargine at 30% reduction,  "10U QHS  -Holding home oral regimen while renal function and/or hemodynamic status is either impaired or threatened  -Preprandial blood glucose target <140 mg/dL and random <180mg/dl, or 140-180 mg/dL for critically ill  -SSI insulin, q4h blood sugar checks, hypoglycemia protocol ordered     Chronic:  ABHINAV: Home CPAP  Gout: PTA allopurinol 200 daily  MDD: PTA fluoxetine  GERD: PTA PPI  COPD: albuterol PRN    FEN:  2gm Na   2L water restriction    PPX: Heparin ggt    CODE: FULL CODE     Patient was discussed with staff attending, Dr. Tomas, who agrees with the above assessment and plan.      Stanford Acuna MD  Cardiology Fellow, PGY-4  Pager: [phone]            Subjective/Interval Events     Overnight, UOP was low, so was given bolus of IV bumex of 4 mg and then 5 mg. This AM, UOP has increased.    This AM, patient w/o any acute complaints. Denying any lightheadedness, dizziness, SOB.          Objective     BP (!) 86/58   Pulse 102   Temp 98.5  F (36.9  C) (Axillary)   Resp 16   Ht 1.702 m (5' 7\")   Wt 110.2 kg (242 lb 15.2 oz)   SpO2 96%   BMI 38.05 kg/m    Temp:  [97.3  F (36.3  C)-98.5  F (36.9  C)] 98.5  F (36.9  C)  Pulse:  [] 102  Heart Rate:  [] 100  Resp:  [14-47] 16  BP: (70-99)/(41-79) 86/58  MAP:  [46 mmHg-78 mmHg] 68 mmHg  Arterial Line BP: (55-97)/(39-66) 89/52  SpO2:  [84 %-96 %] 96 %  Wt Readings from Last 2 Encounters:   02/08/20 110.2 kg (242 lb 15.2 oz)     I/O last 3 completed shifts:  In: 2164.11 [P.O.:270; I.V.:1894.11]  Out: 1500 [Urine:1500]      Gen: No acute distress  HEENT: NC/AT, PERRL, EOM intact, MMM, OP without exudates  PULM/THORAX: Clear to auscultation bilaterally, no rales/rhonchi/wheezes  CV: normal rate, regular rhythm, normal S1 and S2, no murmurs or rubs.  ABD: Soft, NTND, bowel sounds present, no masses  EXT: WWP. No LE edema, clubbing or cyanosis.  NEURO: CN II-XII grossly intact. A&Ox3    Lines:  -R IJ PA catheter              Data:     Recent Results " (from the past 24 hour(s))   Blood gas venous with oxyhemoglobin (Every 4 Hrs x4)    Collection Time: 02/07/20 10:55 AM   Result Value Ref Range    Ph Venous 7.33 7.32 - 7.43 pH    PCO2 Venous 44 40 - 50 mm Hg    PO2 Venous 34 25 - 47 mm Hg    Bicarbonate Venous 23 21 - 28 mmol/L    FIO2 2L     Oxyhemoglobin Venous 56 %    Base Deficit Venous 2.9 mmol/L   Lactic acid whole blood    Collection Time: 02/07/20 10:55 AM   Result Value Ref Range    Lactic Acid 1.4 0.7 - 2.0 mmol/L   Glucose by meter    Collection Time: 02/07/20  1:02 PM   Result Value Ref Range    Glucose 149 (H) 70 - 99 mg/dL   Potassium    Collection Time: 02/07/20  2:50 PM   Result Value Ref Range    Potassium 4.0 3.4 - 5.3 mmol/L   Blood gas venous and oxyhgb    Collection Time: 02/07/20  2:50 PM   Result Value Ref Range    Ph Venous 7.33 7.32 - 7.43 pH    PCO2 Venous 44 40 - 50 mm Hg    PO2 Venous 30 25 - 47 mm Hg    Bicarbonate Venous 23 21 - 28 mmol/L    FIO2 4L     Oxyhemoglobin Venous 46 %    Base Deficit Venous 3.2 mmol/L   Blood gas venous with oxyhemoglobin (Every 4 Hrs x4)    Collection Time: 02/07/20  5:07 PM   Result Value Ref Range    Ph Venous 7.32 7.32 - 7.43 pH    PCO2 Venous 43 40 - 50 mm Hg    PO2 Venous 33 25 - 47 mm Hg    Bicarbonate Venous 22 21 - 28 mmol/L    FIO2 3L     Oxyhemoglobin Venous 50 %    Base Deficit Venous 4.0 mmol/L   Glucose by meter    Collection Time: 02/07/20  5:17 PM   Result Value Ref Range    Glucose 149 (H) 70 - 99 mg/dL   UA with Microscopic reflex to Culture    Collection Time: 02/07/20  8:29 PM   Result Value Ref Range    Color Urine Yellow     Appearance Urine Slightly Cloudy     Glucose Urine Negative NEG^Negative mg/dL    Bilirubin Urine Negative NEG^Negative    Ketones Urine Negative NEG^Negative mg/dL    Specific Gravity Urine 1.019 1.003 - 1.035    Blood Urine Negative NEG^Negative    pH Urine 5.0 5.0 - 7.0 pH    Protein Albumin Urine 30 (A) NEG^Negative mg/dL    Urobilinogen mg/dL Normal 0.0 - 2.0  mg/dL    Nitrite Urine Negative NEG^Negative    Leukocyte Esterase Urine Negative NEG^Negative    Source Catheterized Urine     WBC Urine 3 0 - 5 /HPF    RBC Urine 7 (H) 0 - 2 /HPF    Squamous Epithelial /HPF Urine 2 (H) 0 - 1 /HPF    sperm Present (A) NEG^Negative /HPF    Hyaline Casts 32 (H) 0 - 2 /LPF   Blood gas venous with oxyhemoglobin (Every 4 Hrs x4)    Collection Time: 02/07/20  8:29 PM   Result Value Ref Range    Ph Venous 7.31 (L) 7.32 - 7.43 pH    PCO2 Venous 44 40 - 50 mm Hg    PO2 Venous 40 25 - 47 mm Hg    Bicarbonate Venous 22 21 - 28 mmol/L    FIO2 3L     Oxyhemoglobin Venous 65 %    Base Deficit Venous 3.8 mmol/L   Basic metabolic panel    Collection Time: 02/07/20  8:29 PM   Result Value Ref Range    Sodium 134 133 - 144 mmol/L    Potassium 3.9 3.4 - 5.3 mmol/L    Chloride 104 94 - 109 mmol/L    Carbon Dioxide 22 20 - 32 mmol/L    Anion Gap 8 3 - 14 mmol/L    Glucose 160 (H) 70 - 99 mg/dL    Urea Nitrogen 44 (H) 7 - 30 mg/dL    Creatinine 2.78 (H) 0.66 - 1.25 mg/dL    GFR Estimate 23 (L) >60 mL/min/[1.73_m2]    GFR Estimate If Black 26 (L) >60 mL/min/[1.73_m2]    Calcium 8.4 (L) 8.5 - 10.1 mg/dL   Lactic acid whole blood    Collection Time: 02/07/20  8:29 PM   Result Value Ref Range    Lactic Acid 0.9 0.7 - 2.0 mmol/L   Ethanol urine    Collection Time: 02/07/20  8:29 PM   Result Value Ref Range    Ethanol Qual Urine Negative NEG^Negative   Drug screen urine    Collection Time: 02/07/20  8:29 PM   Result Value Ref Range    Benzodiazepine Qual Urine Positive (A) NEG^Negative    Cannabinoids Qual Urine Negative NEG^Negative    Cocaine Qual Urine Negative NEG^Negative    Opiates Qualitative Urine Negative NEG^Negative    Acetaminophen Qual PENDING NEG^Negative    Amantadine Qual PENDING NEG^Negative    Amitriptyline Qual PENDING NEG^Negative    Amoxapine Qual PENDING NEG^Negative    Amphetamines Qual PENDING NEG^Negative    Atropine Qual PENDING NEG^Negative    Bupropion Qual PENDING NEG^Negative     Caffeine Qual PENDING NEG^Negative    Carbamazepine Qual PENDING NEG^Negative    Chlorpheniramine Qual PENDING NEG^Negative    Chlorpromazine Qual PENDING NEG^Negative    Citalopram Qual PENDING NEG^Negative    Clomipramine Qual PENDING NEG^Negative    Cocaine Qual PENDING NEG^Negative    Codeine Qual PENDING NEG^Negative    Desipramine Qual PENDING NEG^Negative    Dextromethorphan Qual PENDING NEG^Negative    Diphenhydramine Qual PENDING NEG^Negative    Doxepin/metabolite Qual PENDING NEG^Negative    Doxylamine Qual PENDING NEG^Negative    Ephedrine or pseudo Qual PENDING NEG^Negative    Fentanyl Qual PENDING NEG^Negative    Fluoxetine and metab Qual PENDING NEG^Negative    Hydrocodone Qual PENDING NEG^Negative    Hydromorphone Qual PENDING NEG^Negative    Ibuprofen Qual PENDING NEG^Negative    Imipramine Qual PENDING NEG^Negative    Ketamine Qual PENDING NEG^Negative    Lamotrigine Qual PENDING NEG^Negative    Lidocaine Qual PENDING NEG^Negative    Loxapine Qual PENDING NEG^Negative    Maprotiline Qual PENDING NEG^Negative    MDMA Qual PENDING NEG^Negative    Meperidine Qual PENDING NEG^Negative    Methadone Qual PENDING NEG^Negative    Methamphetamine Qual PENDING NEG^Negative    Mirtazapine Qual PENDING NEG^Negative    Morphine Qual PENDING NEG^Negative    Nicotine Qual PENDING NEG^Negative    Nortriptyline Qual PENDING NEG^Negative    Olanzapine Qual PENDING NEG^Negative    Oxycodone Qual PENDING NEG^Negative    Pentazocine Qual PENDING NEG^Negative    Phencyclidine Qual PENDING NEG^Negative    Phentermine Qual PENDING NEG^Negative    Propofol Qual PENDING NEG^Negative    Propoxyphene Qual PENDING NEG^Negative    Propranolol Qual PENDING NEG^Negative    Pyrilamine Qual PENDING NEG^Negative    Quetiapine Metab Qual PENDING NEG^Negative    Salicylate Qual PENDING NEG^Negative    Sertraline Qual PENDING NEG^Negative    Theobromine Qual PENDING NEG^Negative    Trimipramine Qual PENDING NEG^Negative    Topiramate  Qual PENDING NEG^Negative    Tramadol Qual PENDING NEG^Negative    Venlafaxine Qual PENDING NEG^Negative   Glucose by meter    Collection Time: 02/07/20  8:34 PM   Result Value Ref Range    Glucose 146 (H) 70 - 99 mg/dL   Glucose by meter    Collection Time: 02/07/20 10:19 PM   Result Value Ref Range    Glucose 145 (H) 70 - 99 mg/dL   Glucose by meter    Collection Time: 02/08/20 12:01 AM   Result Value Ref Range    Glucose 140 (H) 70 - 99 mg/dL   Blood gas venous with oxyhemoglobin (Every 4 Hrs x4)    Collection Time: 02/08/20 12:02 AM   Result Value Ref Range    Ph Venous 7.31 (L) 7.32 - 7.43 pH    PCO2 Venous 48 40 - 50 mm Hg    PO2 Venous 37 25 - 47 mm Hg    Bicarbonate Venous 24 21 - 28 mmol/L    FIO2 6LPM     Oxyhemoglobin Venous 62 %    Base Deficit Venous 2.9 mmol/L   Comprehensive metabolic panel    Collection Time: 02/08/20  4:08 AM   Result Value Ref Range    Sodium 134 133 - 144 mmol/L    Potassium 3.5 3.4 - 5.3 mmol/L    Chloride 103 94 - 109 mmol/L    Carbon Dioxide 23 20 - 32 mmol/L    Anion Gap 8 3 - 14 mmol/L    Glucose 155 (H) 70 - 99 mg/dL    Urea Nitrogen 50 (H) 7 - 30 mg/dL    Creatinine 2.81 (H) 0.66 - 1.25 mg/dL    GFR Estimate 22 (L) >60 mL/min/[1.73_m2]    GFR Estimate If Black 26 (L) >60 mL/min/[1.73_m2]    Calcium 8.2 (L) 8.5 - 10.1 mg/dL    Bilirubin Total 0.7 0.2 - 1.3 mg/dL    Albumin 3.1 (L) 3.4 - 5.0 g/dL    Protein Total 7.4 6.8 - 8.8 g/dL    Alkaline Phosphatase 157 (H) 40 - 150 U/L    ALT 74 (H) 0 - 70 U/L    AST 78 (H) 0 - 45 U/L   CBC with platelets differential    Collection Time: 02/08/20  4:08 AM   Result Value Ref Range    WBC 7.3 4.0 - 11.0 10e9/L    RBC Count 4.65 4.4 - 5.9 10e12/L    Hemoglobin 13.2 (L) 13.3 - 17.7 g/dL    Hematocrit 40.2 40.0 - 53.0 %    MCV 87 78 - 100 fl    MCH 28.4 26.5 - 33.0 pg    MCHC 32.8 31.5 - 36.5 g/dL    RDW 17.9 (H) 10.0 - 15.0 %    Platelet Count 238 150 - 450 10e9/L    Diff Method Automated Method     % Neutrophils 66.3 %    % Lymphocytes  17.5 %    % Monocytes 12.0 %    % Eosinophils 3.0 %    % Basophils 0.6 %    % Immature Granulocytes 0.6 %    Nucleated RBCs 0 0 /100    Absolute Neutrophil 4.8 1.6 - 8.3 10e9/L    Absolute Lymphocytes 1.3 0.8 - 5.3 10e9/L    Absolute Monocytes 0.9 0.0 - 1.3 10e9/L    Absolute Eosinophils 0.2 0.0 - 0.7 10e9/L    Absolute Basophils 0.0 0.0 - 0.2 10e9/L    Abs Immature Granulocytes 0.0 0 - 0.4 10e9/L    Absolute Nucleated RBC 0.0    Uric acid    Collection Time: 02/08/20  4:08 AM   Result Value Ref Range    Uric Acid 7.5 (H) 3.5 - 7.2 mg/dL   Iron and iron binding capacity    Collection Time: 02/08/20  4:08 AM   Result Value Ref Range    Iron 25 (L) 35 - 180 ug/dL    Iron Binding Cap 306 240 - 430 ug/dL    Iron Saturation Index 8 (L) 15 - 46 %   Ferritin    Collection Time: 02/08/20  4:08 AM   Result Value Ref Range    Ferritin 189 26 - 388 ng/mL   Transferrin    Collection Time: 02/08/20  4:08 AM   Result Value Ref Range    Transferrin 236 210 - 360 mg/dL   INR    Collection Time: 02/08/20  4:08 AM   Result Value Ref Range    INR 1.33 (H) 0.86 - 1.14   Partial thromboplastin time    Collection Time: 02/08/20  4:08 AM   Result Value Ref Range    PTT 32 22 - 37 sec   Phosphorus    Collection Time: 02/08/20  4:08 AM   Result Value Ref Range    Phosphorus 5.0 (H) 2.5 - 4.5 mg/dL   Vitamin B12    Collection Time: 02/08/20  4:08 AM   Result Value Ref Range    Vitamin B12 752 193 - 986 pg/mL   Lactate Dehydrogenase    Collection Time: 02/08/20  4:08 AM   Result Value Ref Range    Lactate Dehydrogenase 363 (H) 85 - 227 U/L   Nt probnp inpatient    Collection Time: 02/08/20  4:08 AM   Result Value Ref Range    N-Terminal Pro BNP Inpatient 12,559 (H) 0 - 900 pg/mL   CRP inflammation    Collection Time: 02/08/20  4:08 AM   Result Value Ref Range    CRP Inflammation 21.0 (H) 0.0 - 8.0 mg/L   ABO/Rh type and screen    Collection Time: 02/08/20  4:08 AM   Result Value Ref Range    ABO A     RH(D) Pos     Antibody Screen Neg      Test Valid Only At          [facility]    Specimen Expires 02/11/2020     Blood Bank Comment       This specimen was not labeled according to requirements for establishing a blood type for   transfusion purposes.  The patient can be transfused with type O red cells until a new   specimen is obtained to confirm the patient's blood type, at which time type-specific   blood can be transfused.     TSH    Collection Time: 02/08/20  4:08 AM   Result Value Ref Range    TSH 1.72 0.40 - 4.00 mU/L   Lipid Profile    Collection Time: 02/08/20  4:08 AM   Result Value Ref Range    Cholesterol 118 <200 mg/dL    Triglycerides 57 <150 mg/dL    HDL Cholesterol 71 >39 mg/dL    LDL Cholesterol Calculated 35 <100 mg/dL    Non HDL Cholesterol 47 <130 mg/dL   Hemoglobin A1c    Collection Time: 02/08/20  4:08 AM   Result Value Ref Range    Hemoglobin A1C 7.3 (H) 0 - 5.6 %   Blood gas venous with oxyhemoglobin (Every 4 Hrs x4)    Collection Time: 02/08/20  4:08 AM   Result Value Ref Range    Ph Venous 7.32 7.32 - 7.43 pH    PCO2 Venous 45 40 - 50 mm Hg    PO2 Venous 37 25 - 47 mm Hg    Bicarbonate Venous 23 21 - 28 mmol/L    FIO2 6LPM     Oxyhemoglobin Venous 63 %    Base Deficit Venous 3.1 mmol/L   Lactic acid whole blood    Collection Time: 02/08/20  4:08 AM   Result Value Ref Range    Lactic Acid 0.6 (L) 0.7 - 2.0 mmol/L   Glucose by meter    Collection Time: 02/08/20  7:45 AM   Result Value Ref Range    Glucose 159 (H) 70 - 99 mg/dL   Blood gas venous with oxyhemoglobin (Every 4 Hrs x4)    Collection Time: 02/08/20  8:05 AM   Result Value Ref Range    Ph Venous 7.32 7.32 - 7.43 pH    PCO2 Venous 47 40 - 50 mm Hg    PO2 Venous 37 25 - 47 mm Hg    Bicarbonate Venous 24 21 - 28 mmol/L    FIO2 3LPM     Oxyhemoglobin Venous 65 %    Base Deficit Venous 2.2 mmol/L       Recent Results (from the past 24 hour(s))   Echocardiogram Complete    Narrative     [id number]  [id number]  [id number]  [id number]^JIM           [facility],[place]  Echocardiography Laboratory  [address] [place] [id number]     Name: WOLFGANG LEACH  MRN: [id number]  : 1955  Study Date: 2020 10:06 AM  Age: 64 yrs  Gender: Male  Patient Location: [facility]  Reason For Study: Heart Failure  Ordering Physician: NAHUN GUTIERREZ  Referring Physician: SELF, REFERRED  Performed By: Yvonne Adan RDCS     BSA: 2.2 m2  Height: 67 in  Weight: 244 lb  HR: 103  BP: 72/52 mmHg  _____________________________________________________________________________  __        Procedure  Complete Portable Echo Adult. Contrast Optison. Optison (NDC #5587-4306-48)  given intravenously. Patient was given 6 ml mixture of 3 ml Optison and 6 ml  saline. 3 ml wasted.  _____________________________________________________________________________  __        Interpretation Summary  Left ventricular size is normal.  LVEF 20% based on biplane 2D tracing.  Mild to moderate right ventricular dilation is present.  Global right ventricular function is moderately reduced.  Pulmonary artery systolic pressure is normal.  Dilation of the inferior vena cava is present with abnormal respiratory  variation in diameter.  _____________________________________________________________________________  __        Left Ventricle  Left ventricular size is normal. LVEF 20% based on biplane 2D tracing. Left  ventricular wall thickness is normal. Diastolic function not assessed due to  frequent ectopy. Abnormal septal motion consistent with left bundle branch  block is present.     Right Ventricle  Mild to moderate right ventricular dilation is present. Global right  ventricular function is moderately reduced.     Atria  Mild biatrial enlargement is present.     Mitral Valve  Moderate mitral insufficiency is present.        Aortic Valve  Aortic valve is normal in structure and function.     Tricuspid  Valve  Moderate tricuspid insufficiency is present. The right ventricular systolic  pressure is approximated at 24.4 mmHg plus the right atrial pressure.  Pulmonary artery systolic pressure is normal.     Pulmonic Valve  The pulmonic valve cannot be assessed. Mild pulmonic insufficiency is present.     Vessels  The aorta root is normal. The pulmonary artery cannot be assessed. Dilation of  the inferior vena cava is present with abnormal respiratory variation in  diameter.     Compared to Previous Study  Previous study not available for comparison.     _____________________________________________________________________________  __  MMode/2D Measurements & Calculations  IVSd: 0.61 cm  LVIDd: 4.7 cm  LVIDs: 3.7 cm  LVPWd: 0.75 cm  FS: 21.7 %  LV mass(C)d: 99.1 grams  LV mass(C)dI: 45.0 grams/m2     Ao root diam: 2.9 cm  asc Aorta Diam: 2.5 cm  LVOT diam: 1.9 cm  LVOT area: 2.8 cm2     EF(MOD-bp): 21.5 %  LA Volume (BP): 84.8 ml  LA Volume Index (BP): 38.5 ml/m2  RWT: 0.32        Doppler Measurements & Calculations  PA acc time: 0.09 sec     PI end-d saumya: 154.0 cm/sec  TR max saumya: 247.0 cm/sec  TR max P.4 mmHg     _____________________________________________________________________________  __           Report approved by: Graciela Tomlinson 2020 12:05 PM      XR Chest Port 1 View    Narrative    XR CHEST PORT 1 VW  2020 4:21 PM      HISTORY: SG Placement    COMPARISON: None available    FINDINGS: Single AP chest radiograph. Lena-Chucky catheter tip is in the  proximal right main pulmonary artery. Right central line tip and right  upper extremity PICC line tip at the lower SVC. Trachea is midline,  cardiomegaly. Bilateral perihilar streaky opacities. Mild increased  interstitial opacities in the right lung with ill-defined pulmonary  vascularity. No pneumothorax or pleural effusion. No acute osseous or  abdominal abnormality. Elevated left hemidiaphragm.      Impression    IMPRESSION:  1. Lena-Chucky  catheter tip at the proximal right main pulmonary artery.  2. Cardiomegaly with mild pulmonary edema, especially on the right.    I have personally reviewed the examination and initial interpretation  and I agree with the findings.    DONG SOLORIO MD   Cardiac Catheterization    Narrative      Successful insertion of a IABP in the RFA                Medications     Current Facility-Administered Medications   Medication     acetaminophen (TYLENOL) tablet 325 mg     acetaminophen (TYLENOL) tablet 650 mg     albuterol (PROAIR HFA/PROVENTIL HFA/VENTOLIN HFA) 108 (90 Base) MCG/ACT inhaler 2 puff     allopurinol (ZYLOPRIM) tablet 200 mg     amiodarone (NEXTERONE) 250 mg in D5W 250 mL infusion     aspirin (ASA) chewable tablet 81 mg     atorvastatin (LIPITOR) tablet 20 mg     bumetanide (BUMEX) injection 4 mg     calcium carbonate (TUMS) chewable tablet 500 mg     co-enzyme Q-10 capsule 200 mg     glucose gel 15-30 g    Or     dextrose 50 % injection 25-50 mL    Or     glucagon injection 1 mg     DOPamine 400 mg in dextrose 5% 250 mL (adult std) - premix     fentaNYL (PF) (SUBLIMAZE) injection 25 mcg     FLUoxetine (PROzac) capsule 20 mg     heparin  drip 25,000 units in 0.45% NaCl 250 mL  ANTICOAGULANT  (see additional administration details for dose)     heparin bolus from infusion pump     insulin aspart (NovoLOG) inj (RAPID ACTING)     insulin aspart (NovoLOG) inj (RAPID ACTING)     insulin glargine (LANTUS PEN) injection 10 Units     lidocaine (LMX4) cream     lidocaine 1 % 0.1-1 mL     magnesium oxide (MAG-OX) tablet 400 mg     magnesium sulfate 2 g in water intermittent infusion     magnesium sulfate 4 g in 100 mL sterile water (premade)     medication instruction     milrinone (PRIMACOR) infusion 200 mcg/mL PREMIX     naloxone (NARCAN) injection 0.1-0.4 mg     omeprazole (priLOSEC) CR capsule 20 mg     potassium chloride (KLOR-CON) Packet 20-40 mEq     potassium chloride 10 mEq in 100 mL intermittent  infusion with 10 mg lidocaine     potassium chloride 10 mEq in 100 mL sterile water intermittent infusion (premix)     potassium chloride 20 mEq in 50 mL intermittent infusion     potassium chloride ER (K-DUR/KLOR-CON M) CR tablet 20-40 mEq     potassium phosphate 10 mmol in D5W 250 mL intermittent infusion     potassium phosphate 15 mmol in D5W 250 mL intermittent infusion     potassium phosphate 20 mmol in D5W 250 mL intermittent infusion     potassium phosphate 20 mmol in D5W 500 mL intermittent infusion     potassium phosphate 25 mmol in D5W 500 mL intermittent infusion     sodium chloride (PF) 0.9% PF flush 3 mL     sodium chloride (PF) 0.9% PF flush 3 mL

## 2020-02-09 ENCOUNTER — APPOINTMENT (OUTPATIENT)
Dept: GENERAL RADIOLOGY | Facility: CLINIC | Age: 65
DRG: 001 | End: 2020-02-09
Attending: STUDENT IN AN ORGANIZED HEALTH CARE EDUCATION/TRAINING PROGRAM
Payer: MEDICARE

## 2020-02-09 LAB
ANION GAP SERPL CALCULATED.3IONS-SCNC: 5 MMOL/L (ref 3–14)
ANION GAP SERPL CALCULATED.3IONS-SCNC: 6 MMOL/L (ref 3–14)
BASE EXCESS BLDV CALC-SCNC: 1.3 MMOL/L
BASE EXCESS BLDV CALC-SCNC: 1.9 MMOL/L
BASE EXCESS BLDV CALC-SCNC: 3.1 MMOL/L
BASE EXCESS BLDV CALC-SCNC: 4.6 MMOL/L
BUN SERPL-MCNC: 42 MG/DL (ref 7–30)
BUN SERPL-MCNC: 47 MG/DL (ref 7–30)
CALCIUM SERPL-MCNC: 8.4 MG/DL (ref 8.5–10.1)
CALCIUM SERPL-MCNC: 8.6 MG/DL (ref 8.5–10.1)
CHLORIDE SERPL-SCNC: 100 MMOL/L (ref 94–109)
CHLORIDE SERPL-SCNC: 102 MMOL/L (ref 94–109)
CO2 SERPL-SCNC: 28 MMOL/L (ref 20–32)
CO2 SERPL-SCNC: 29 MMOL/L (ref 20–32)
CREAT SERPL-MCNC: 1.7 MG/DL (ref 0.66–1.25)
CREAT SERPL-MCNC: 2.2 MG/DL (ref 0.66–1.25)
GFR SERPL CREATININE-BSD FRML MDRD: 30 ML/MIN/{1.73_M2}
GFR SERPL CREATININE-BSD FRML MDRD: 41 ML/MIN/{1.73_M2}
GLUCOSE BLDC GLUCOMTR-MCNC: 155 MG/DL (ref 70–99)
GLUCOSE BLDC GLUCOMTR-MCNC: 158 MG/DL (ref 70–99)
GLUCOSE BLDC GLUCOMTR-MCNC: 180 MG/DL (ref 70–99)
GLUCOSE SERPL-MCNC: 146 MG/DL (ref 70–99)
GLUCOSE SERPL-MCNC: 155 MG/DL (ref 70–99)
HCO3 BLDV-SCNC: 28 MMOL/L (ref 21–28)
HCO3 BLDV-SCNC: 28 MMOL/L (ref 21–28)
HCO3 BLDV-SCNC: 30 MMOL/L (ref 21–28)
HCO3 BLDV-SCNC: 31 MMOL/L (ref 21–28)
LACTATE BLD-SCNC: 0.6 MMOL/L (ref 0.7–2)
LACTATE BLD-SCNC: 0.7 MMOL/L (ref 0.7–2)
LMWH PPP CHRO-ACNC: 0.31 IU/ML
LMWH PPP CHRO-ACNC: 0.37 IU/ML
LMWH PPP CHRO-ACNC: 0.45 IU/ML
LMWH PPP CHRO-ACNC: 0.77 IU/ML
MAGNESIUM SERPL-MCNC: 1.5 MG/DL (ref 1.6–2.3)
MAGNESIUM SERPL-MCNC: 2.2 MG/DL (ref 1.6–2.3)
O2/TOTAL GAS SETTING VFR VENT: ABNORMAL %
O2/TOTAL GAS SETTING VFR VENT: ABNORMAL %
O2/TOTAL GAS SETTING VFR VENT: NORMAL %
O2/TOTAL GAS SETTING VFR VENT: NORMAL %
OXYHGB MFR BLDV: 56 %
OXYHGB MFR BLDV: 57 %
OXYHGB MFR BLDV: 59 %
OXYHGB MFR BLDV: 62 %
PCO2 BLDV: 50 MM HG (ref 40–50)
PCO2 BLDV: 50 MM HG (ref 40–50)
PCO2 BLDV: 51 MM HG (ref 40–50)
PCO2 BLDV: 52 MM HG (ref 40–50)
PH BLDV: 7.35 PH (ref 7.32–7.43)
PH BLDV: 7.36 PH (ref 7.32–7.43)
PH BLDV: 7.37 PH (ref 7.32–7.43)
PH BLDV: 7.39 PH (ref 7.32–7.43)
PHOSPHATE SERPL-MCNC: 4.5 MG/DL (ref 2.5–4.5)
PLATELET # BLD AUTO: 179 10E9/L (ref 150–450)
PO2 BLDV: 32 MM HG (ref 25–47)
PO2 BLDV: 33 MM HG (ref 25–47)
PO2 BLDV: 33 MM HG (ref 25–47)
PO2 BLDV: 35 MM HG (ref 25–47)
POTASSIUM SERPL-SCNC: 3.6 MMOL/L (ref 3.4–5.3)
POTASSIUM SERPL-SCNC: 3.8 MMOL/L (ref 3.4–5.3)
POTASSIUM SERPL-SCNC: 3.9 MMOL/L (ref 3.4–5.3)
SODIUM SERPL-SCNC: 134 MMOL/L (ref 133–144)
SODIUM SERPL-SCNC: 136 MMOL/L (ref 133–144)

## 2020-02-09 PROCEDURE — 84132 ASSAY OF SERUM POTASSIUM: CPT

## 2020-02-09 PROCEDURE — 99233 SBSQ HOSP IP/OBS HIGH 50: CPT | Mod: GC | Performed by: INTERNAL MEDICINE

## 2020-02-09 PROCEDURE — 85520 HEPARIN ASSAY: CPT | Performed by: STUDENT IN AN ORGANIZED HEALTH CARE EDUCATION/TRAINING PROGRAM

## 2020-02-09 PROCEDURE — 80048 BASIC METABOLIC PNL TOTAL CA: CPT | Performed by: INTERNAL MEDICINE

## 2020-02-09 PROCEDURE — 25000132 ZZH RX MED GY IP 250 OP 250 PS 637: Mod: GY

## 2020-02-09 PROCEDURE — 82805 BLOOD GASES W/O2 SATURATION: CPT | Performed by: INTERNAL MEDICINE

## 2020-02-09 PROCEDURE — 71045 X-RAY EXAM CHEST 1 VIEW: CPT

## 2020-02-09 PROCEDURE — 25000128 H RX IP 250 OP 636

## 2020-02-09 PROCEDURE — 40000275 ZZH STATISTIC RCP TIME EA 10 MIN

## 2020-02-09 PROCEDURE — 00000146 ZZHCL STATISTIC GLUCOSE BY METER IP

## 2020-02-09 PROCEDURE — 83735 ASSAY OF MAGNESIUM: CPT

## 2020-02-09 PROCEDURE — 25000128 H RX IP 250 OP 636: Performed by: STUDENT IN AN ORGANIZED HEALTH CARE EDUCATION/TRAINING PROGRAM

## 2020-02-09 PROCEDURE — 83735 ASSAY OF MAGNESIUM: CPT | Performed by: INTERNAL MEDICINE

## 2020-02-09 PROCEDURE — 25000132 ZZH RX MED GY IP 250 OP 250 PS 637: Mod: GY | Performed by: STUDENT IN AN ORGANIZED HEALTH CARE EDUCATION/TRAINING PROGRAM

## 2020-02-09 PROCEDURE — 20000004 ZZH R&B ICU UMMC

## 2020-02-09 PROCEDURE — 85520 HEPARIN ASSAY: CPT | Performed by: INTERNAL MEDICINE

## 2020-02-09 PROCEDURE — 83605 ASSAY OF LACTIC ACID: CPT | Performed by: INTERNAL MEDICINE

## 2020-02-09 PROCEDURE — 25000132 ZZH RX MED GY IP 250 OP 250 PS 637: Mod: GY | Performed by: INTERNAL MEDICINE

## 2020-02-09 PROCEDURE — 25000128 H RX IP 250 OP 636: Performed by: INTERNAL MEDICINE

## 2020-02-09 PROCEDURE — 85049 AUTOMATED PLATELET COUNT: CPT | Performed by: INTERNAL MEDICINE

## 2020-02-09 PROCEDURE — 84100 ASSAY OF PHOSPHORUS: CPT

## 2020-02-09 RX ORDER — BUMETANIDE 0.25 MG/ML
4 INJECTION INTRAMUSCULAR; INTRAVENOUS
Status: COMPLETED | OUTPATIENT
Start: 2020-02-10 | End: 2020-02-10

## 2020-02-09 RX ORDER — ACETAMINOPHEN 325 MG/1
650 TABLET ORAL EVERY 4 HOURS PRN
Status: DISCONTINUED | OUTPATIENT
Start: 2020-02-09 | End: 2020-02-22

## 2020-02-09 RX ADMIN — THIAMINE HCL TAB 100 MG 100 MG: 100 TAB at 07:59

## 2020-02-09 RX ADMIN — TRAMADOL HYDROCHLORIDE 25 MG: 50 TABLET, COATED ORAL at 22:41

## 2020-02-09 RX ADMIN — INSULIN GLARGINE 10 UNITS: 100 INJECTION, SOLUTION SUBCUTANEOUS at 21:54

## 2020-02-09 RX ADMIN — POTASSIUM CHLORIDE 20 MEQ: 750 TABLET, EXTENDED RELEASE ORAL at 13:41

## 2020-02-09 RX ADMIN — AMIODARONE HYDROCHLORIDE 400 MG: 200 TABLET ORAL at 07:59

## 2020-02-09 RX ADMIN — DICLOFENAC 4 G: 10 GEL TOPICAL at 21:26

## 2020-02-09 RX ADMIN — MAGNESIUM SULFATE IN WATER 4 G: 40 INJECTION, SOLUTION INTRAVENOUS at 12:59

## 2020-02-09 RX ADMIN — OMEPRAZOLE 20 MG: 20 CAPSULE, DELAYED RELEASE ORAL at 07:59

## 2020-02-09 RX ADMIN — BUMETANIDE 4 MG: 0.25 INJECTION INTRAMUSCULAR; INTRAVENOUS at 03:41

## 2020-02-09 RX ADMIN — ALLOPURINOL 200 MG: 100 TABLET ORAL at 07:59

## 2020-02-09 RX ADMIN — ASPIRIN 81 MG CHEWABLE TABLET 81 MG: 81 TABLET CHEWABLE at 07:59

## 2020-02-09 RX ADMIN — POTASSIUM CHLORIDE 20 MEQ: 750 TABLET, EXTENDED RELEASE ORAL at 07:59

## 2020-02-09 RX ADMIN — ACETAMINOPHEN 325 MG: 325 TABLET, FILM COATED ORAL at 17:35

## 2020-02-09 RX ADMIN — ACETAMINOPHEN 325 MG: 325 TABLET, FILM COATED ORAL at 20:22

## 2020-02-09 RX ADMIN — Medication 200 MG: at 08:00

## 2020-02-09 RX ADMIN — AMIODARONE HYDROCHLORIDE 400 MG: 200 TABLET ORAL at 19:17

## 2020-02-09 RX ADMIN — HEPARIN SODIUM 1050 UNITS/HR: 10000 INJECTION, SOLUTION INTRAVENOUS at 05:58

## 2020-02-09 RX ADMIN — DOPAMINE HYDROCHLORIDE IN DEXTROSE 3 MCG/KG/MIN: 1.6 INJECTION, SOLUTION INTRAVENOUS at 12:58

## 2020-02-09 RX ADMIN — FLUOXETINE 20 MG: 20 CAPSULE ORAL at 07:59

## 2020-02-09 RX ADMIN — POTASSIUM CHLORIDE 20 MEQ: 750 TABLET, EXTENDED RELEASE ORAL at 17:48

## 2020-02-09 RX ADMIN — ATORVASTATIN CALCIUM 20 MG: 20 TABLET, FILM COATED ORAL at 19:17

## 2020-02-09 RX ADMIN — MAGNESIUM OXIDE 400 MG: 400 TABLET ORAL at 07:59

## 2020-02-09 ASSESSMENT — ACTIVITIES OF DAILY LIVING (ADL)
ADLS_ACUITY_SCORE: 14

## 2020-02-09 ASSESSMENT — PAIN DESCRIPTION - DESCRIPTORS: DESCRIPTORS: ACHING

## 2020-02-09 ASSESSMENT — MIFFLIN-ST. JEOR: SCORE: 1808.63

## 2020-02-09 NOTE — PROGRESS NOTES
Cardiology Progress Note  Eliseo Tanner MRN: [id number]  Age: 64 year old, : 1955  Date: 2020      Critical Care  Gucci Tomas    I personally saw and examined this patient and agree with the findings, assessment, and plan as outlined by the housestaff this day.  The patient remains in the ICU secondary to advanced circulatory disease characterized by shock, the need for parenteral  inotropic agents and/or vasopressors to maintain blood pressure and organ perfusion., the careful assessment of renal function in response to low cardiac output state and need for substantial volumes of fluid for drug administration.  I personally assessed.  the logic and continuity of care plan over the preceding twenty four hours, assessed the continuing need for parenteral and oral medications and their appropriate dosing in the context of circulatory status and renal function.  I reviewed all interim laboratory and imaging.  I coordinated care with nursing staff and consultants. I disucssed the patient's status with the patient and or family.  30 minutes ICU        Assessment and Plan:     Mr Eliseo Tanner is a 63yo M w/PMHx notable for chronic systolic heart failure, NICM, moderate CAD, HTN, ABHINAV on CPAP, DM2, CKDIII who is transferred to [facility] for evaluation and management of advanced heart failure with arrhythmia.     Today's plan:  -Continue diuresis with goal negative 1.5-2L  -Continue dopamine 3, dobutamine 5, IABP at 1:1     Moderate CAD  Severe Mitral regurgitation  Chronic nonischemic systolic heart failure w/ reduced EF (15-20%) and exacerbation  NYHA Class III. Echo 2020: LVEF 15-20%; LVEDd 5.9 cm; 4+ MR. [facility] 2019 w/ nonobstructive CAD w/ severe branch disease. Presented with increased wt gain, SOB, LE edema concerning for HF exacerbation, initially concerning for cardiogenic shock. Admitted to [facility] for further evaluation regarding need for advanced HF therapies.    -Financial approval obtained for LVAD w/u; order set has been placed  -Jackson placed: hemodynamics w/ Jane CO/CI q6h  -Telemetry  -Lactic acid, VBG ordered; trend  -Beta-blocker: None due to decompensated HF  -ACEi/ARB/ARNI: Holding home Entresto  mg BID  -Aldosterone antagonist: Holding spironolactone until renal assessed   -Volume status: Hypervolemic on exam. Weight on admit 240. Baseline weight 225-230.  -Diuretic regimen: for 2/9: Bumex 4 mg IV BID  -Continue dopamine 3, dobutmaine 5  -Continue ASA 81, atorvastatin 20 mg    BERNARDA on CKDIII  Cr on admission 1.7, baseline Cr 1.5-2. Rising creatinine and marginal UOP. Goal to improve renal function with diuresis in order to make best candidate for potential LVAD.  -Trend Cr  -Daily fluid balance  -Diuresis as above    Precapillary pulmonary hypertension:  Significant transpulmonary gradient of 14 on right heart cath numbers. May be related to CTEPH vs. WHO I.  -Empiric heparin ggt low intensity  -d-dimer, V/Q scan     VFib requiring shock  Shocked x 3 prior to transfer, loaded with amio. No known prior history. Suspect related to underlying HF.   -Keep K>4, Mg>2  -Amio 400 mg PO BID  -Need ICD for secondary prevention     Diabetes Mellitus Type II  Last A1c 7.2% 2/6/20  Home regimen includes: 15U basaglar at bedtime, glipizide 2.5  -Will resume home glargine at 30% reduction, 10U QHS  -Holding home oral regimen while renal function and/or hemodynamic status is either impaired or threatened  -Preprandial blood glucose target <140 mg/dL and random <180mg/dl, or 140-180 mg/dL for critically ill  -SSI insulin, q4h blood sugar checks, hypoglycemia protocol ordered     Chronic:  ABHINAV: Home CPAP  Gout: PTA allopurinol 200 daily  MDD: PTA fluoxetine  GERD: PTA PPI  COPD: albuterol PRN    FEN:  2gm Na   2L water restriction    PPX: Heparin ggt    CODE: FULL CODE     Patient was discussed with staff attending, Dr. Tomas, who agrees with the above assessment and  "plan.      Stanford Acuna MD  Cardiology Fellow, PGY-5  Pager: [phone]            Subjective/Interval Events     No acute overnight evens.  This AM, patient w/o any acute complaints. Denying any lightheadedness, dizziness, SOB.          Objective     BP (!) 86/58   Pulse 102   Temp 97.9  F (36.6  C) (Oral)   Resp (!) 35   Ht 1.702 m (5' 7\")   Wt 107 kg (235 lb 14.3 oz)   SpO2 97%   BMI 36.95 kg/m    Temp:  [97  F (36.1  C)-98.1  F (36.7  C)] 97.9  F (36.6  C)  Heart Rate:  [] 99  Resp:  [9-37] 35  MAP:  [51 mmHg-93 mmHg] 76 mmHg  Arterial Line BP: ()/(26-63) 121/51  SpO2:  [91 %-98 %] 97 %  Wt Readings from Last 2 Encounters:   02/09/20 107 kg (235 lb 14.3 oz)     I/O last 3 completed shifts:  In: 2573.6 [P.O.:1230; I.V.:1343.6]  Out: 5670 [Urine:5670]      Gen: No acute distress  HEENT: NC/AT, PERRL, EOM intact, MMM, OP without exudates  PULM/THORAX: Clear to auscultation bilaterally, no rales/rhonchi/wheezes  CV: normal rate, regular rhythm, normal S1 and S2, no murmurs or rubs.  ABD: Soft, NTND, bowel sounds present, no masses  EXT: WWP. No LE edema, clubbing or cyanosis.  NEURO: CN II-XII grossly intact. A&Ox3    Lines:  -R IJ PA catheter              Data:     Recent Results (from the past 24 hour(s))   D dimer quantitative    Collection Time: 02/08/20  9:09 AM   Result Value Ref Range    D Dimer 15.6 (H) 0.0 - 0.50 ug/ml FEU   CBC with platelets    Collection Time: 02/08/20  9:32 AM   Result Value Ref Range    WBC 8.1 4.0 - 11.0 10e9/L    RBC Count 4.63 4.4 - 5.9 10e12/L    Hemoglobin 13.0 (L) 13.3 - 17.7 g/dL    Hematocrit 40.5 40.0 - 53.0 %    MCV 88 78 - 100 fl    MCH 28.1 26.5 - 33.0 pg    MCHC 32.1 31.5 - 36.5 g/dL    RDW 17.6 (H) 10.0 - 15.0 %    Platelet Count 229 150 - 450 10e9/L   Blood gas venous with oxyhemoglobin (Every 4 Hrs x4)    Collection Time: 02/08/20  1:00 PM   Result Value Ref Range    Ph Venous 7.33 7.32 - 7.43 pH    PCO2 Venous 49 40 - 50 mm Hg    PO2 Venous 34 25 " - 47 mm Hg    Bicarbonate Venous 25 21 - 28 mmol/L    FIO2 4L     Oxyhemoglobin Venous 59 %    Base Deficit Venous 1.2 mmol/L   Calcium ionized whole blood    Collection Time: 02/08/20  1:00 PM   Result Value Ref Range    Calcium Ionized Whole Blood 4.6 4.4 - 5.2 mg/dL   Glucose by meter    Collection Time: 02/08/20  1:02 PM   Result Value Ref Range    Glucose 160 (H) 70 - 99 mg/dL   Blood gas venous with oxyhemoglobin (Every 4 Hrs x4)    Collection Time: 02/08/20  4:54 PM   Result Value Ref Range    Ph Venous 7.32 7.32 - 7.43 pH    PCO2 Venous 51 (H) 40 - 50 mm Hg    PO2 Venous 31 25 - 47 mm Hg    Bicarbonate Venous 26 21 - 28 mmol/L    FIO2 4L     Oxyhemoglobin Venous 51 %    Base Deficit Venous 0.4 mmol/L   Basic metabolic panel    Collection Time: 02/08/20  4:54 PM   Result Value Ref Range    Sodium 132 (L) 133 - 144 mmol/L    Potassium 3.8 3.4 - 5.3 mmol/L    Chloride 100 94 - 109 mmol/L    Carbon Dioxide 27 20 - 32 mmol/L    Anion Gap 5 3 - 14 mmol/L    Glucose 147 (H) 70 - 99 mg/dL    Urea Nitrogen 48 (H) 7 - 30 mg/dL    Creatinine 2.55 (H) 0.66 - 1.25 mg/dL    GFR Estimate 25 (L) >60 mL/min/[1.73_m2]    GFR Estimate If Black 29 (L) >60 mL/min/[1.73_m2]    Calcium 8.5 8.5 - 10.1 mg/dL   Heparin 10a Level    Collection Time: 02/08/20  4:54 PM   Result Value Ref Range    Heparin 10A Level <0.10 IU/mL   Lactic acid whole blood    Collection Time: 02/08/20  4:54 PM   Result Value Ref Range    Lactic Acid 0.9 0.7 - 2.0 mmol/L   Blood gas venous with oxyhemoglobin (Every 4 Hrs x4)    Collection Time: 02/08/20  7:19 PM   Result Value Ref Range    Ph Venous 7.33 7.32 - 7.43 pH    PCO2 Venous 46 40 - 50 mm Hg    PO2 Venous 34 25 - 47 mm Hg    Bicarbonate Venous 24 21 - 28 mmol/L    FIO2 4 lpm     Oxyhemoglobin Venous 53 %    Base Deficit Venous 1.9 mmol/L   Glucose by meter    Collection Time: 02/08/20  9:36 PM   Result Value Ref Range    Glucose 221 (H) 70 - 99 mg/dL   Blood gas venous with oxyhemoglobin (Every 4 Hrs  x4)    Collection Time: 02/08/20 11:51 PM   Result Value Ref Range    Ph Venous 7.35 7.32 - 7.43 pH    PCO2 Venous 50 40 - 50 mm Hg    PO2 Venous 35 25 - 47 mm Hg    Bicarbonate Venous 28 21 - 28 mmol/L    FIO2 5L     Oxyhemoglobin Venous 62 %    Base Excess Venous 1.3 mmol/L   Heparin Xa (10a) Level    Collection Time: 02/08/20 11:51 PM   Result Value Ref Range    Heparin 10A Level 0.77 IU/mL   Blood gas venous with oxyhemoglobin (Every 4 Hrs x4)    Collection Time: 02/09/20  3:25 AM   Result Value Ref Range    Ph Venous 7.36 7.32 - 7.43 pH    PCO2 Venous 50 40 - 50 mm Hg    PO2 Venous 32 25 - 47 mm Hg    Bicarbonate Venous 28 21 - 28 mmol/L    FIO2 5L     Oxyhemoglobin Venous 56 %    Base Excess Venous 1.9 mmol/L   Basic metabolic panel    Collection Time: 02/09/20  3:25 AM   Result Value Ref Range    Sodium 134 133 - 144 mmol/L    Potassium 3.6 3.4 - 5.3 mmol/L    Chloride 102 94 - 109 mmol/L    Carbon Dioxide 28 20 - 32 mmol/L    Anion Gap 5 3 - 14 mmol/L    Glucose 146 (H) 70 - 99 mg/dL    Urea Nitrogen 47 (H) 7 - 30 mg/dL    Creatinine 2.20 (H) 0.66 - 1.25 mg/dL    GFR Estimate 30 (L) >60 mL/min/[1.73_m2]    GFR Estimate If Black 35 (L) >60 mL/min/[1.73_m2]    Calcium 8.6 8.5 - 10.1 mg/dL   Heparin Xa (10a) Level    Collection Time: 02/09/20  3:25 AM   Result Value Ref Range    Heparin 10A Level 0.45 IU/mL   Platelet count    Collection Time: 02/09/20  3:25 AM   Result Value Ref Range    Platelet Count 179 150 - 450 10e9/L   Lactic acid whole blood    Collection Time: 02/09/20  3:25 AM   Result Value Ref Range    Lactic Acid 0.7 0.7 - 2.0 mmol/L   Heparin Xa (10a) Level    Collection Time: 02/09/20  6:44 AM   Result Value Ref Range    Heparin 10A Level 0.37 IU/mL   Blood gas venous with oxyhemoglobin (Every 4 Hrs x4)    Collection Time: 02/09/20  8:05 AM   Result Value Ref Range    Ph Venous 7.37 7.32 - 7.43 pH    PCO2 Venous 52 (H) 40 - 50 mm Hg    PO2 Venous 33 25 - 47 mm Hg    Bicarbonate Venous 30 (H) 21 -  28 mmol/L    FIO2 4L     Oxyhemoglobin Venous 57 %    Base Excess Venous 3.1 mmol/L       Recent Results (from the past 24 hour(s))   Echocardiogram Complete    Narrative    [id number]  [id number]  [id number]  [id number]^[id number]^JIM           [facility]  Echocardiography Laboratory  [address] [place] [address]     Name: WOLFGANG LEACH  MRN: [id number]  : 1955  Study Date: 2020 10:06 AM  Age: 64 yrs  Gender: Male  Patient Location: [facility]  Reason For Study: Heart Failure  Ordering Physician: NAHUN GUTIERREZ  Referring Physician: SELF, REFERRED  Performed By: Yvonne Adan RDCS     BSA: 2.2 m2  Height: 67 in  Weight: 244 lb  HR: 103  BP: 72/52 mmHg  _____________________________________________________________________________  __        Procedure  Complete Portable Echo Adult. Contrast Optison. Optison (NDC #8689-9504-65)  given intravenously. Patient was given 6 ml mixture of 3 ml Optison and 6 ml  saline. 3 ml wasted.  _____________________________________________________________________________  __        Interpretation Summary  Left ventricular size is normal.  LVEF 20% based on biplane 2D tracing.  Mild to moderate right ventricular dilation is present.  Global right ventricular function is moderately reduced.  Pulmonary artery systolic pressure is normal.  Dilation of the inferior vena cava is present with abnormal respiratory  variation in diameter.  _____________________________________________________________________________  __        Left Ventricle  Left ventricular size is normal. LVEF 20% based on biplane 2D tracing. Left  ventricular wall thickness is normal. Diastolic function not assessed due to  frequent ectopy. Abnormal septal motion consistent with left bundle branch  block is present.     Right Ventricle  Mild to moderate right ventricular dilation is present. Global right  ventricular function is moderately reduced.     Atria  Mild  biatrial enlargement is present.     Mitral Valve  Moderate mitral insufficiency is present.        Aortic Valve  Aortic valve is normal in structure and function.     Tricuspid Valve  Moderate tricuspid insufficiency is present. The right ventricular systolic  pressure is approximated at 24.4 mmHg plus the right atrial pressure.  Pulmonary artery systolic pressure is normal.     Pulmonic Valve  The pulmonic valve cannot be assessed. Mild pulmonic insufficiency is present.     Vessels  The aorta root is normal. The pulmonary artery cannot be assessed. Dilation of  the inferior vena cava is present with abnormal respiratory variation in  diameter.     Compared to Previous Study  Previous study not available for comparison.     _____________________________________________________________________________  __  MMode/2D Measurements & Calculations  IVSd: 0.61 cm  LVIDd: 4.7 cm  LVIDs: 3.7 cm  LVPWd: 0.75 cm  FS: 21.7 %  LV mass(C)d: 99.1 grams  LV mass(C)dI: 45.0 grams/m2     Ao root diam: 2.9 cm  asc Aorta Diam: 2.5 cm  LVOT diam: 1.9 cm  LVOT area: 2.8 cm2     EF(MOD-bp): 21.5 %  LA Volume (BP): 84.8 ml  LA Volume Index (BP): 38.5 ml/m2  RWT: 0.32        Doppler Measurements & Calculations  PA acc time: 0.09 sec     PI end-d saumya: 154.0 cm/sec  TR max saumya: 247.0 cm/sec  TR max P.4 mmHg     _____________________________________________________________________________  __           Report approved by: Graciela Tomlinson 2020 12:05 PM      XR Chest Port 1 View    Narrative    XR CHEST PORT 1 VW  2020 4:21 PM      HISTORY: SG Placement    COMPARISON: None available    FINDINGS: Single AP chest radiograph. [place] catheter tip is in the  proximal right main pulmonary artery. Right central line tip and right  upper extremity PICC line tip at the lower SVC. Trachea is midline,  cardiomegaly. Bilateral perihilar streaky opacities. Mild increased  interstitial opacities in the right lung with ill-defined  pulmonary  vascularity. No pneumothorax or pleural effusion. No acute osseous or  abdominal abnormality. Elevated left hemidiaphragm.      Impression    IMPRESSION:  1. [place] catheter tip at the proximal right main pulmonary artery.  2. Cardiomegaly with mild pulmonary edema, especially on the right.    I have personally reviewed the examination and initial interpretation  and I agree with the findings.    DONG SOLORIO MD   Cardiac Catheterization    Narrative      Successful insertion of a IABP in the RFA                Medications     Current Facility-Administered Medications   Medication     acetaminophen (TYLENOL) tablet 325 mg     acetaminophen (TYLENOL) tablet 650 mg     albuterol (PROAIR HFA/PROVENTIL HFA/VENTOLIN HFA) 108 (90 Base) MCG/ACT inhaler 2 puff     allopurinol (ZYLOPRIM) tablet 200 mg     amiodarone (PACERONE/CODARONE) tablet 400 mg     aspirin (ASA) chewable tablet 81 mg     atorvastatin (LIPITOR) tablet 20 mg     bumetanide (BUMEX) injection 4 mg     calcium carbonate (TUMS) chewable tablet 500 mg     co-enzyme Q-10 capsule 200 mg     glucose gel 15-30 g    Or     dextrose 50 % injection 25-50 mL    Or     glucagon injection 1 mg     DOBUTamine (DOBUTREX) 1,000 mg in D5W 250 mL infusion (adult max conc)     DOPamine 400 mg in dextrose 5% 250 mL (adult std) - premix     fentaNYL (PF) (SUBLIMAZE) injection 25 mcg     FLUoxetine (PROzac) capsule 20 mg     heparin  drip 25,000 units in 0.45% NaCl 250 mL  ANTICOAGULANT  (see additional administration details for dose)     heparin bolus from infusion pump     insulin aspart (NovoLOG) inj (RAPID ACTING)     insulin aspart (NovoLOG) inj (RAPID ACTING)     insulin glargine (LANTUS PEN) injection 10 Units     lidocaine (LMX4) cream     lidocaine 1 % 0.1-1 mL     magnesium oxide (MAG-OX) tablet 400 mg     magnesium sulfate 2 g in water intermittent infusion     magnesium sulfate 4 g in 100 mL sterile water (premade)     medication instruction      naloxone (NARCAN) injection 0.1-0.4 mg     omeprazole (priLOSEC) CR capsule 20 mg     potassium chloride (KLOR-CON) Packet 20-40 mEq     potassium chloride 10 mEq in 100 mL intermittent infusion with 10 mg lidocaine     potassium chloride 10 mEq in 100 mL sterile water intermittent infusion (premix)     potassium chloride 20 mEq in 50 mL intermittent infusion     potassium chloride ER (K-DUR/KLOR-CON M) CR tablet 20-40 mEq     potassium phosphate 10 mmol in D5W 250 mL intermittent infusion     potassium phosphate 15 mmol in D5W 250 mL intermittent infusion     potassium phosphate 20 mmol in D5W 250 mL intermittent infusion     potassium phosphate 20 mmol in D5W 500 mL intermittent infusion     potassium phosphate 25 mmol in D5W 500 mL intermittent infusion     sodium chloride (PF) 0.9% PF flush 3 mL     sodium chloride (PF) 0.9% PF flush 3 mL     vitamin B1 (THIAMINE) tablet 100 mg

## 2020-02-09 NOTE — PLAN OF CARE
Pt remains hemodynamically stable. Dobutamine increased to 5 mcg/kg/min d/t SVO2<60. Repeat gas with improved SVO2. SVO2 did drop below 60 with 0400 draw, but no further med changes at this time per Cards II. No acute events this shift. See flowsheets for further assessments/interventions. Continuing to monitor.    Problem: Adult Inpatient Plan of Care  Goal: Plan of Care Review  2/9/2020 0622 by Marjan Del Rio RN  Outcome: No Change     Problem: Adult Inpatient Plan of Care  Goal: Patient-Specific Goal (Individualization)  2/9/2020 0622 by Marjan Del Rio, RN  Outcome: No Change     Problem: Adult Inpatient Plan of Care  Goal: Absence of Hospital-Acquired Illness or Injury  2/9/2020 0622 by Marjan Del Rio RN  Outcome: No Change     Problem: Adult Inpatient Plan of Care  Goal: Optimal Comfort and Wellbeing  2/9/2020 0622 by Marjan Del Rio RN  Outcome: No Change     Problem: Adult Inpatient Plan of Care  Goal: Readiness for Transition of Care  2/9/2020 0622 by Marjan Del Rio RN  Outcome: No Change     Problem: Adult Inpatient Plan of Care  Goal: Rounds/Family Conference  2/9/2020 0622 by Marjan Del Rio RN  Outcome: No Change     Problem: Cardiac Disease Comorbidity  Goal: Cardiac Disease  Description  Patient comorbidity will be monitored for signs and symptoms of Cardiac Disease.  Problems will be absent, minimized or managed by discharge/transition of care.  2/9/2020 0622 by Marjan Del Rio RN  Outcome: No Change     Problem: Diabetes Comorbidity  Goal: Blood Glucose Level Within Desired Range  2/9/2020 0622 by Marjan Del Rio, RN  Outcome: No Change     Problem: Heart Failure Comorbidity  Goal: Maintenance of Heart Failure Symptom Control  2/9/2020 0622 by Marjan Del Rio, RN  Outcome: No Change     Problem: Obstructive Sleep Apnea Risk or Actual (Comorbidity Management)  Goal: Unobstructed Breathing During Sleep  2/9/2020 0622 by Marjan Del Rio, RN  Outcome: No Change     Problem:  Cardiac Output Decreased  Goal: Effective Cardiac Output  2/9/2020 0622 by Marjan Del Rio, RN  Outcome: No Change

## 2020-02-10 ENCOUNTER — APPOINTMENT (OUTPATIENT)
Dept: CT IMAGING | Facility: CLINIC | Age: 65
DRG: 001 | End: 2020-02-10
Attending: STUDENT IN AN ORGANIZED HEALTH CARE EDUCATION/TRAINING PROGRAM
Payer: MEDICARE

## 2020-02-10 ENCOUNTER — APPOINTMENT (OUTPATIENT)
Dept: ULTRASOUND IMAGING | Facility: CLINIC | Age: 65
DRG: 001 | End: 2020-02-10
Attending: INTERNAL MEDICINE
Payer: MEDICARE

## 2020-02-10 ENCOUNTER — APPOINTMENT (OUTPATIENT)
Dept: CARDIOLOGY | Facility: CLINIC | Age: 65
DRG: 001 | End: 2020-02-10
Attending: STUDENT IN AN ORGANIZED HEALTH CARE EDUCATION/TRAINING PROGRAM
Payer: MEDICARE

## 2020-02-10 ENCOUNTER — APPOINTMENT (OUTPATIENT)
Dept: GENERAL RADIOLOGY | Facility: CLINIC | Age: 65
DRG: 001 | End: 2020-02-10
Attending: STUDENT IN AN ORGANIZED HEALTH CARE EDUCATION/TRAINING PROGRAM
Payer: MEDICARE

## 2020-02-10 ENCOUNTER — DOCUMENTATION ONLY (OUTPATIENT)
Dept: CARDIOLOGY | Facility: CLINIC | Age: 65
End: 2020-02-10

## 2020-02-10 LAB
ANION GAP SERPL CALCULATED.3IONS-SCNC: 5 MMOL/L (ref 3–14)
ANION GAP SERPL CALCULATED.3IONS-SCNC: 6 MMOL/L (ref 3–14)
BASE EXCESS BLDV CALC-SCNC: 4.5 MMOL/L
BASE EXCESS BLDV CALC-SCNC: 5 MMOL/L
BASE EXCESS BLDV CALC-SCNC: 5.7 MMOL/L
BASE EXCESS BLDV CALC-SCNC: 7.9 MMOL/L
BASE EXCESS BLDV CALC-SCNC: 7.9 MMOL/L
BUN SERPL-MCNC: 36 MG/DL (ref 7–30)
BUN SERPL-MCNC: 40 MG/DL (ref 7–30)
CALCIUM SERPL-MCNC: 8.5 MG/DL (ref 8.5–10.1)
CALCIUM SERPL-MCNC: 8.6 MG/DL (ref 8.5–10.1)
CHLORIDE SERPL-SCNC: 100 MMOL/L (ref 94–109)
CHLORIDE SERPL-SCNC: 102 MMOL/L (ref 94–109)
CO2 SERPL-SCNC: 28 MMOL/L (ref 20–32)
CO2 SERPL-SCNC: 32 MMOL/L (ref 20–32)
CREAT SERPL-MCNC: 1.34 MG/DL (ref 0.66–1.25)
CREAT SERPL-MCNC: 1.57 MG/DL (ref 0.66–1.25)
ERYTHROCYTE [DISTWIDTH] IN BLOOD BY AUTOMATED COUNT: 18.1 % (ref 10–15)
GAMMA INTERFERON BACKGROUND BLD IA-ACNC: 0.02 IU/ML
GFR SERPL CREATININE-BSD FRML MDRD: 45 ML/MIN/{1.73_M2}
GFR SERPL CREATININE-BSD FRML MDRD: 55 ML/MIN/{1.73_M2}
GLUCOSE BLDC GLUCOMTR-MCNC: 140 MG/DL (ref 70–99)
GLUCOSE BLDC GLUCOMTR-MCNC: 145 MG/DL (ref 70–99)
GLUCOSE BLDC GLUCOMTR-MCNC: 150 MG/DL (ref 70–99)
GLUCOSE BLDC GLUCOMTR-MCNC: 163 MG/DL (ref 70–99)
GLUCOSE BLDC GLUCOMTR-MCNC: 167 MG/DL (ref 70–99)
GLUCOSE SERPL-MCNC: 143 MG/DL (ref 70–99)
GLUCOSE SERPL-MCNC: 157 MG/DL (ref 70–99)
HBV CORE AB SERPL QL IA: NONREACTIVE
HBV SURFACE AB SERPL IA-ACNC: 1.99 M[IU]/ML
HBV SURFACE AG SERPL QL IA: NONREACTIVE
HCO3 BLDV-SCNC: 31 MMOL/L (ref 21–28)
HCO3 BLDV-SCNC: 31 MMOL/L (ref 21–28)
HCO3 BLDV-SCNC: 32 MMOL/L (ref 21–28)
HCO3 BLDV-SCNC: 34 MMOL/L (ref 21–28)
HCO3 BLDV-SCNC: 34 MMOL/L (ref 21–28)
HCT VFR BLD AUTO: 41.9 % (ref 40–53)
HCV AB SERPL QL IA: NONREACTIVE
HGB BLD-MCNC: 13.5 G/DL (ref 13.3–17.7)
HIV 1+2 AB+HIV1 P24 AG SERPL QL IA: NONREACTIVE
LACTATE BLD-SCNC: 0.6 MMOL/L (ref 0.7–2)
LMWH PPP CHRO-ACNC: 0.19 IU/ML
M TB IFN-G BLD-IMP: NEGATIVE
M TB IFN-G CD4+ BCKGRND COR BLD-ACNC: 8.26 IU/ML
MAGNESIUM SERPL-MCNC: 2.1 MG/DL (ref 1.6–2.3)
MCH RBC QN AUTO: 28.1 PG (ref 26.5–33)
MCHC RBC AUTO-ENTMCNC: 32.2 G/DL (ref 31.5–36.5)
MCV RBC AUTO: 87 FL (ref 78–100)
MITOGEN IGNF BCKGRD COR BLD-ACNC: 0.01 IU/ML
MITOGEN IGNF BCKGRD COR BLD-ACNC: 0.04 IU/ML
O2/TOTAL GAS SETTING VFR VENT: ABNORMAL %
OXYHGB MFR BLDV: 51 %
OXYHGB MFR BLDV: 54 %
OXYHGB MFR BLDV: 56 %
OXYHGB MFR BLDV: 58 %
OXYHGB MFR BLDV: 60 %
PCO2 BLDV: 49 MM HG (ref 40–50)
PCO2 BLDV: 52 MM HG (ref 40–50)
PCO2 BLDV: 53 MM HG (ref 40–50)
PCO2 BLDV: 53 MM HG (ref 40–50)
PCO2 BLDV: 54 MM HG (ref 40–50)
PH BLDV: 7.38 PH (ref 7.32–7.43)
PH BLDV: 7.39 PH (ref 7.32–7.43)
PH BLDV: 7.41 PH (ref 7.32–7.43)
PH BLDV: 7.41 PH (ref 7.32–7.43)
PH BLDV: 7.43 PH (ref 7.32–7.43)
PHOSPHATE SERPL-MCNC: 3.7 MG/DL (ref 2.5–4.5)
PLATELET # BLD AUTO: 161 10E9/L (ref 150–450)
PO2 BLDV: 30 MM HG (ref 25–47)
PO2 BLDV: 31 MM HG (ref 25–47)
PO2 BLDV: 32 MM HG (ref 25–47)
PO2 BLDV: 33 MM HG (ref 25–47)
PO2 BLDV: 34 MM HG (ref 25–47)
POTASSIUM SERPL-SCNC: 3.6 MMOL/L (ref 3.4–5.3)
POTASSIUM SERPL-SCNC: 3.9 MMOL/L (ref 3.4–5.3)
PREALB SERPL IA-MCNC: 24 MG/DL (ref 15–45)
RBC # BLD AUTO: 4.81 10E12/L (ref 4.4–5.9)
SODIUM SERPL-SCNC: 135 MMOL/L (ref 133–144)
SODIUM SERPL-SCNC: 137 MMOL/L (ref 133–144)
T GONDII IGG SER QL: <3 IU/ML (ref 0–7.1)
WBC # BLD AUTO: 6.9 10E9/L (ref 4–11)

## 2020-02-10 PROCEDURE — 85520 HEPARIN ASSAY: CPT | Performed by: INTERNAL MEDICINE

## 2020-02-10 PROCEDURE — 83735 ASSAY OF MAGNESIUM: CPT | Performed by: INTERNAL MEDICINE

## 2020-02-10 PROCEDURE — 40000196 ZZH STATISTIC RAPCV CVP MONITORING

## 2020-02-10 PROCEDURE — 93308 TTE F-UP OR LMTD: CPT | Mod: 26 | Performed by: INTERNAL MEDICINE

## 2020-02-10 PROCEDURE — 80048 BASIC METABOLIC PNL TOTAL CA: CPT | Performed by: INTERNAL MEDICINE

## 2020-02-10 PROCEDURE — 99233 SBSQ HOSP IP/OBS HIGH 50: CPT | Mod: 25 | Performed by: INTERNAL MEDICINE

## 2020-02-10 PROCEDURE — 40000048 ZZH STATISTIC DAILY SWAN MONITORING

## 2020-02-10 PROCEDURE — 20000004 ZZH R&B ICU UMMC

## 2020-02-10 PROCEDURE — 40000076 ZZH STATISTIC IABP MONITORING

## 2020-02-10 PROCEDURE — 71045 X-RAY EXAM CHEST 1 VIEW: CPT

## 2020-02-10 PROCEDURE — 82805 BLOOD GASES W/O2 SATURATION: CPT | Performed by: INTERNAL MEDICINE

## 2020-02-10 PROCEDURE — 25000128 H RX IP 250 OP 636: Performed by: STUDENT IN AN ORGANIZED HEALTH CARE EDUCATION/TRAINING PROGRAM

## 2020-02-10 PROCEDURE — 85027 COMPLETE CBC AUTOMATED: CPT | Performed by: INTERNAL MEDICINE

## 2020-02-10 PROCEDURE — 84100 ASSAY OF PHOSPHORUS: CPT | Performed by: INTERNAL MEDICINE

## 2020-02-10 PROCEDURE — 40000893 ZZH STATISTIC PT IP EVAL DEFER

## 2020-02-10 PROCEDURE — 93325 DOPPLER ECHO COLOR FLOW MAPG: CPT | Mod: 26 | Performed by: INTERNAL MEDICINE

## 2020-02-10 PROCEDURE — 70486 CT MAXILLOFACIAL W/O DYE: CPT

## 2020-02-10 PROCEDURE — 82805 BLOOD GASES W/O2 SATURATION: CPT | Performed by: STUDENT IN AN ORGANIZED HEALTH CARE EDUCATION/TRAINING PROGRAM

## 2020-02-10 PROCEDURE — 00000146 ZZHCL STATISTIC GLUCOSE BY METER IP

## 2020-02-10 PROCEDURE — 83605 ASSAY OF LACTIC ACID: CPT | Performed by: INTERNAL MEDICINE

## 2020-02-10 PROCEDURE — 93922 UPR/L XTREMITY ART 2 LEVELS: CPT

## 2020-02-10 PROCEDURE — 40000281 ZZH STATISTIC TRANSPORT TIME EA 15 MIN

## 2020-02-10 PROCEDURE — 25000132 ZZH RX MED GY IP 250 OP 250 PS 637: Mod: GY | Performed by: INTERNAL MEDICINE

## 2020-02-10 PROCEDURE — 73110 X-RAY EXAM OF WRIST: CPT | Mod: LT

## 2020-02-10 PROCEDURE — 3E043XZ INTRODUCTION OF VASOPRESSOR INTO CENTRAL VEIN, PERCUTANEOUS APPROACH: ICD-10-PCS | Performed by: INTERNAL MEDICINE

## 2020-02-10 PROCEDURE — 25800030 ZZH RX IP 258 OP 636: Performed by: STUDENT IN AN ORGANIZED HEALTH CARE EDUCATION/TRAINING PROGRAM

## 2020-02-10 PROCEDURE — 40000014 ZZH STATISTIC ARTERIAL MONITORING DAILY

## 2020-02-10 PROCEDURE — 25000132 ZZH RX MED GY IP 250 OP 250 PS 637: Mod: GY | Performed by: STUDENT IN AN ORGANIZED HEALTH CARE EDUCATION/TRAINING PROGRAM

## 2020-02-10 PROCEDURE — 25000128 H RX IP 250 OP 636: Performed by: INTERNAL MEDICINE

## 2020-02-10 PROCEDURE — 93308 TTE F-UP OR LMTD: CPT

## 2020-02-10 PROCEDURE — 40000275 ZZH STATISTIC RCP TIME EA 10 MIN

## 2020-02-10 PROCEDURE — 93321 DOPPLER ECHO F-UP/LMTD STD: CPT | Mod: 26 | Performed by: INTERNAL MEDICINE

## 2020-02-10 PROCEDURE — 25000132 ZZH RX MED GY IP 250 OP 250 PS 637: Mod: GY

## 2020-02-10 RX ORDER — BUMETANIDE 0.25 MG/ML
4 INJECTION INTRAMUSCULAR; INTRAVENOUS DAILY
Status: DISCONTINUED | OUTPATIENT
Start: 2020-02-11 | End: 2020-02-12

## 2020-02-10 RX ORDER — METHYLPREDNISOLONE SODIUM SUCCINATE 125 MG/2ML
125 INJECTION, POWDER, LYOPHILIZED, FOR SOLUTION INTRAMUSCULAR; INTRAVENOUS
Status: DISCONTINUED | OUTPATIENT
Start: 2020-02-10 | End: 2020-02-12

## 2020-02-10 RX ORDER — BUMETANIDE 0.25 MG/ML
4 INJECTION INTRAMUSCULAR; INTRAVENOUS ONCE
Status: COMPLETED | OUTPATIENT
Start: 2020-02-10 | End: 2020-02-10

## 2020-02-10 RX ORDER — DIPHENHYDRAMINE HYDROCHLORIDE 50 MG/ML
50 INJECTION INTRAMUSCULAR; INTRAVENOUS
Status: DISCONTINUED | OUTPATIENT
Start: 2020-02-10 | End: 2020-02-12

## 2020-02-10 RX ADMIN — MAGNESIUM OXIDE 400 MG: 400 TABLET ORAL at 08:26

## 2020-02-10 RX ADMIN — POTASSIUM CHLORIDE 20 MEQ: 750 TABLET, EXTENDED RELEASE ORAL at 18:42

## 2020-02-10 RX ADMIN — AMIODARONE HYDROCHLORIDE 400 MG: 200 TABLET ORAL at 08:27

## 2020-02-10 RX ADMIN — ASPIRIN 81 MG CHEWABLE TABLET 81 MG: 81 TABLET CHEWABLE at 08:26

## 2020-02-10 RX ADMIN — DOPAMINE HYDROCHLORIDE IN DEXTROSE 3 MCG/KG/MIN: 1.6 INJECTION, SOLUTION INTRAVENOUS at 09:30

## 2020-02-10 RX ADMIN — DICLOFENAC 4 G: 10 GEL TOPICAL at 08:22

## 2020-02-10 RX ADMIN — HEPARIN SODIUM 900 UNITS/HR: 10000 INJECTION, SOLUTION INTRAVENOUS at 08:35

## 2020-02-10 RX ADMIN — IRON SUCROSE 200 MG: 20 INJECTION, SOLUTION INTRAVENOUS at 11:38

## 2020-02-10 RX ADMIN — POTASSIUM CHLORIDE 20 MEQ: 750 TABLET, EXTENDED RELEASE ORAL at 04:23

## 2020-02-10 RX ADMIN — DICLOFENAC 4 G: 10 GEL TOPICAL at 16:16

## 2020-02-10 RX ADMIN — BUMETANIDE 4 MG: 0.25 INJECTION INTRAMUSCULAR; INTRAVENOUS at 12:42

## 2020-02-10 RX ADMIN — ALLOPURINOL 200 MG: 100 TABLET ORAL at 08:27

## 2020-02-10 RX ADMIN — BUMETANIDE 4 MG: 0.25 INJECTION INTRAMUSCULAR; INTRAVENOUS at 08:05

## 2020-02-10 RX ADMIN — DICLOFENAC 4 G: 10 GEL TOPICAL at 11:43

## 2020-02-10 RX ADMIN — DOBUTAMINE 5 MCG/KG/MIN: 12.5 INJECTION, SOLUTION INTRAVENOUS at 06:14

## 2020-02-10 RX ADMIN — ACETAMINOPHEN 650 MG: 325 TABLET, FILM COATED ORAL at 04:23

## 2020-02-10 RX ADMIN — INSULIN GLARGINE 10 UNITS: 100 INJECTION, SOLUTION SUBCUTANEOUS at 22:05

## 2020-02-10 RX ADMIN — ATORVASTATIN CALCIUM 20 MG: 20 TABLET, FILM COATED ORAL at 19:49

## 2020-02-10 RX ADMIN — DOPAMINE HYDROCHLORIDE IN DEXTROSE 2 MCG/KG/MIN: 1.6 INJECTION, SOLUTION INTRAVENOUS at 19:27

## 2020-02-10 RX ADMIN — DICLOFENAC 4 G: 10 GEL TOPICAL at 19:55

## 2020-02-10 RX ADMIN — OMEPRAZOLE 20 MG: 20 CAPSULE, DELAYED RELEASE ORAL at 08:27

## 2020-02-10 RX ADMIN — FLUOXETINE 20 MG: 20 CAPSULE ORAL at 08:27

## 2020-02-10 RX ADMIN — Medication 200 MG: at 08:26

## 2020-02-10 RX ADMIN — ACETAMINOPHEN 650 MG: 325 TABLET, FILM COATED ORAL at 00:36

## 2020-02-10 RX ADMIN — THIAMINE HCL TAB 100 MG 100 MG: 100 TAB at 08:27

## 2020-02-10 RX ADMIN — AMIODARONE HYDROCHLORIDE 400 MG: 200 TABLET ORAL at 19:49

## 2020-02-10 ASSESSMENT — ACTIVITIES OF DAILY LIVING (ADL)
ADLS_ACUITY_SCORE: 14

## 2020-02-10 ASSESSMENT — PAIN DESCRIPTION - DESCRIPTORS: DESCRIPTORS: ACHING

## 2020-02-10 ASSESSMENT — MIFFLIN-ST. JEOR: SCORE: 1814.63

## 2020-02-10 NOTE — PLAN OF CARE
Major Shift Events:  Hemodynamics continued to be monitored and IV Drips titrated per hemodynamics. LVAD workup continued today. No other major events to note.  Plan: Continue plan of care. Possible LVAD this week.  For vital signs and complete assessments, please see documentation flowsheets.

## 2020-02-10 NOTE — CONSULTS
Patient of Dr. Cisneros seen at bedside on 4E for psychosocial assessment.   60 minutes spent with patient. 100% of visit consisted of counseling related to issues surrounding LVAD.    Psychosocial Assessment   Name: Eliseo Tanner     MRN:  [id number]        Patient Name / Age / Race: Eliseo Tanner 64 year old    Source of Information: Patient & Wife (Veronique)   : Aaliyah Francis   Interview Date: February 10, 2020   Reason for Referral: Mechanical Circulatory Support     Current Living Situation   Location ([facility]/State): [address] [place] [id number]   With Whom: Living arrangements - Pt lives with his wife and 13yo granddaughter Mer (legally adopted)   Type of Home: single family - 3 stair entry, able to live on main level    Working Phone? Yes    Three Pronged Outlet? Yes    Distance from Hospital: 183 miles (3.5hrs from [facility])   Need to Relocate Post Surgery? Yes    Discussed? Yes - Pt and wife provided information on local accommodations. Currently on [place] waitlist. Potential to stay locally with niece?     Vocational/Employment/Financial   Employment: Disabled - Pt currently on short-term disability    Occupation: Feed  at [employer]   Education: high school   ? No   Income: SS & spouse's income   Insurance: Medicare   Name of Private Insurance: BCBS   Medication Coverage: Yes    In current situation, pt. can afford medication and supply costs:  Yes    Other Financial Concerns/Issues: Some financial concerns surrounding relocation. Informed Pt and wife of resources, but encouraged to establish long term  plan.       Family/Social Support   Marital Status:    Partner Name: Jed JohansenVeroniqueEmily Tanner   Length of Time :  13 years, been together for 23 years   Partner s Employment: EMT &  at [employer]   Relationship: stable/supportive   Children: 3 children: daughter ([place]), 2 sons ([place] & [place]) - very  supportive and available for support  6 grandchildren   Parents:  - wife's parents available and supportive   Siblings: 1 brother in [place], [place] - unavailable for support  Pt's sister-in-law (Grecia Bustos) will be secondary caregiver   Other Family or Friends Close by: Several friends through motorcycle community and Yaz[employer]ism.   Support System: available, helpful   Primary Support Person: Wife, Veronique   Issues: Pt and wife primary caregivers for 13yo granddaughterMer - she will stay with in-laws while Pt and wife are relocated. Discussed 24hr caregiver support and wound care. Pt's sister-in-law will be secondary caregiver, understands education requirements. Pt's wife nor SAMIR express concerns in being able to fulfill duties.      Activities/Functional Ability   Current Level: Ambulatory, independent with ADLs   Daily Activities: worked up until [place], caregiver to granddaughter, likes to ride motorcycle during the summer   Transportation: self      Medical Status   Patient s understanding of need for surgical intervention: Pt and wife understand need for advanced therapies. Needs follow up after transplant option is presented.   Advanced Directive? No     Details: Provided copy   Adherence History: Good - Pt reports managing appointments/medications well. Chart review corroborates    Ability to Adhere to Complex Medical Regime: Good     Behavioral   Chemical Dependency Issues: No - denies hx of alcohol/chemical dependency. Has not drank since    Smoker? No - hx smoking quit in    Psychiatric impairment: No - denies concerns   Coping Style/Strategies: Spend time with family, ride motorcycle   Recent Legal History: No   Teaching Completed During Assessment Related To Mechanical Circulatory Support: 1. Housing and relocation needs post surgery.  2. Caregiver needs post surgery.  3. Financial issues related to surgery.  4. Risks of alcohol use post surgery.  5. Common psychosocial stressors  pre/post surgery.  6. Support group availability.   Psychosocial Risks Reviewed Related To Mechanical Circulatory Support: 1. Increased stress related to your emotional, family, social, employment, or financial situation.  2. Affect on work and/or disability benefits.  3. Affect on future health and life insurance.  4. Outcome expectations may not be met.  5. Mental Health risks: anxiety, depression, PTSD, guilt, grief and chronic fatigue.     Observed Behavior   Well informed? Yes  Angry? No   Process info well? Yes  Hostile? No   Evasive? No Oriented X3? Yes    Cautious/Suspicious? No Motivated? Yes    Appropriate Behavior? Yes  Depressed? No   Appropriate Affect? Yes  Anxious? No   Interview Observations: Pt pleasant and engaging, though understandably overwhelmed by eval process. Acknowledges need for advanced therapies at this time. Asking appropriate questions. Appears to have discussed implications of LVAD with wife after initial discussion with Dr. Cisneros      Selection Criteria Met   Plan for Support Yes    Chemical Dependence No   Smoking No   Mental Health No   Adequate Finances Yes      Risk Issues:    No psychosocial risk issues identified.     Final Evaluation/Assessment:     Writer met with Pt and wife at bedside. Pt and wife understandably overwhelmed with evaluation process, as advanced heart failure is fairly recent (Oct 2019). Pt was engaged and forthcoming and his wife provided supplemental information.      Pt has identified a great support system. Pt's wife is able to provide 24/7 caregiver support required post LVAD. Pt's wife currently recovering from rotator cuff surgery - attending OP PT in [place] 2x/week. Unsure when she will be fully recovered. Pt's sister in law plans to be secondary caregiver - lives 3 doors down from Pt. Works full time but understands education/relocation requirement. Pt and wife have several friends and family that live near their home to provide additional support.  Pt and wife are legal guardians to 15yo granddaughter - she will plan to stay with Pt's in-laws during Pt's relocation.      No concerns in the area of chemical dependency. Pt denies hx of alcohol and/or drug abuse. Reportedly has not drank since 1993.     Pt reports hx of smoking - quit in 1993. Denies use of smokeless tobacco.     No obvious concerns around mental health that would prohibit LVAD. Per chart review, possible hx of anxiety, but denied at this time. Pt coping appropriately and understands current need for advanced therapies.      There are no concerns around insurance. Potential concerns surrounding relocation costs. Pt and wife provided resources and encouraged to begin developing long-term plan.     From a psychosocial perspective, pt appears to be a good candidate for LVAD.     Would be necessary to follow-up with Pt and family if/when heart transplant is presented as an option.    Patient understands risks and benefits of Mechanical Circulatory Support: Yes     Recommendation:    excellent    Signature: Aaliyah Francis     Title: Licensed Independent Clinical                Interview Date: February 10, 2020

## 2020-02-10 NOTE — PROGRESS NOTES
Cardiology Progress Note  Eliseo Tanner MRN: [id number]  Age: 64 year old, : 1955  Date: 2020      Critical Care  Gucci Tomas    I personally saw and examined this patient and agree with the findings, assessment, and plan as outlined by the housestaff this day.  The patient remains in the ICU secondary to advanced circulatory disease characterized by shock, the need for parenteral  inotropic agents and/or vasopressors to maintain blood pressure and organ perfusion., the careful assessment of renal function in response to low cardiac output state and need for substantial volumes of fluid for drug administration.  I personally assessed.  the logic and continuity of care plan over the preceding twenty four hours, assessed the continuing need for parenteral and oral medications and their appropriate dosing in the context of circulatory status and renal function.  I reviewed all interim laboratory and imaging.  I coordinated care with nursing staff and consultants. I disucssed the patient's status with the patient and or family.  The patient remains inotrope and vasopressor dependentHe also remains IABP dependent.  Renal function is markedly improved.  30 minutes        Assessment and Plan:     Mr Eliseo Tanner is a 65yo M w/PMHx notable for chronic systolic heart failure, NICM, moderate CAD, HTN, ABHINAV on CPAP, DM2, CKDIII who is transferred to [facility] for evaluation and management of advanced heart failure with arrhythmia.     Today's plan (2/10):  -Continue diuresis with goal negative 1.0L  -Continue dopamine 3, dobutamine 6, IABP at 1:1  -Follow-up with LVAD coordinator re: completion of w/u  -Follow-up with CVTS regarding LVAD surgery day availablilty     Moderate CAD  Severe Mitral regurgitation  Chronic nonischemic systolic heart failure w/ reduced EF (15-20%) and exacerbation  NYHA Class III. Echo 2020: LVEF 15-20%; LVEDd 5.9 cm; 4+ MR. LHC 2019 w/  nonobstructive CAD w/ severe branch disease. Presented with increased wt gain, SOB, LE edema concerning for HF exacerbation, initially concerning for cardiogenic shock. Admitted to [facility] for further evaluation regarding need for advanced HF therapies.   -Financial approval obtained for LVAD w/u; order set has been placed  -[place] placed: hemodynamics w/ Jane CO/CI q6h  -Telemetry  -Lactic acid, VBG ordered; trend  -Beta-blocker: None due to decompensated HF  -ACEi/ARB/ARNI: Holding home Entresto  mg BID  -Aldosterone antagonist: Holding spironolactone until renal assessed   -Volume status: Hypervolemic on exam. Weight on admit 240. Baseline weight 225-230.  -Diuretic regimen: for 2/9: Bumex 4 mg IV QD  -Continue dopamine 3, dobutmaine 6  -Continue ASA 81, atorvastatin 20 mg    BERNARDA on CKDIII  Cr on admission 1.7, baseline Cr 1.5-2. Goal to improve renal function with diuresis in order to make best candidate for potential LVAD.  -Trend Cr  -Daily fluid balance  -Diuresis as above    Precapillary pulmonary hypertension:  Significant transpulmonary gradient of 14 on right heart cath numbers. May be related to CTEPH vs. WHO I.  -Empiric heparin ggt low intensity  -d-dimer, V/Q scan     VFib requiring shock  Shocked x 3 prior to transfer, loaded with amio. No known prior history. Suspect related to underlying HF.   -Keep K>4, Mg>2  -Amio 400 mg PO BID  -Need ICD for secondary prevention     Diabetes Mellitus Type II  Last A1c 7.2% 2/6/20  Home regimen includes: 15U basaglar at bedtime, glipizide 2.5  -Will resume home glargine at 30% reduction, 10U QHS  -Holding home oral regimen while renal function and/or hemodynamic status is either impaired or threatened  -Preprandial blood glucose target <140 mg/dL and random <180mg/dl, or 140-180 mg/dL for critically ill  -SSI insulin, q4h blood sugar checks, hypoglycemia protocol ordered     Chronic:  ABHINAV: Home CPAP  Gout: PTA allopurinol 200 daily  MDD: PTA fluoxetine  GERD:  "PTA PPI  COPD: albuterol PRN    FEN:  2gm Na   2L water restriction    PPX: Heparin ggt    CODE: FULL CODE     Patient was discussed with staff attending, Dr. Tomas, who agrees with the above assessment and plan.      Stanford Acuna MD  Cardiology Fellow, PGY-5  Pager: [phone]            Subjective/Interval Events     No acute overnight evens.  This AM, patient w/o any acute complaints. Denying any lightheadedness, dizziness, SOB.          Objective     BP (!) 86/58   Pulse 102   Temp 97.8  F (36.6  C) (Oral)   Resp 20   Ht 1.702 m (5' 7\")   Wt 107.6 kg (237 lb 3.4 oz)   SpO2 98%   BMI 37.15 kg/m    Temp:  [97  F (36.1  C)-98.5  F (36.9  C)] 97.8  F (36.6  C)  Heart Rate:  [] 89  Resp:  [11-32] 20  MAP:  [69 mmHg-87 mmHg] 80 mmHg  Arterial Line BP: ()/(46-62) 107/54  SpO2:  [94 %-100 %] 98 %  Wt Readings from Last 2 Encounters:   02/10/20 107.6 kg (237 lb 3.4 oz)     I/O last 3 completed shifts:  In: 2844 [P.O.:1740; I.V.:1104]  Out: 3810 [Urine:3810]      Gen: No acute distress  HEENT: NC/AT, PERRL, EOM intact, MMM, OP without exudates  PULM/THORAX: Clear to auscultation bilaterally, no rales/rhonchi/wheezes  CV: normal rate, regular rhythm, normal S1 and S2, no murmurs or rubs.  ABD: Soft, NTND, bowel sounds present, no masses  EXT: WWP. No LE edema, clubbing or cyanosis.  NEURO: CN II-XII grossly intact. A&Ox3    Lines:  -R IJ PA catheter              Data:     Recent Results (from the past 24 hour(s))   Potassium    Collection Time: 02/09/20 11:43 AM   Result Value Ref Range    Potassium 3.8 3.4 - 5.3 mmol/L   Magnesium    Collection Time: 02/09/20 11:43 AM   Result Value Ref Range    Magnesium 1.5 (L) 1.6 - 2.3 mg/dL   Phosphorus    Collection Time: 02/09/20 11:43 AM   Result Value Ref Range    Phosphorus 4.5 2.5 - 4.5 mg/dL   Heparin Xa (10a) Level    Collection Time: 02/09/20  1:45 PM   Result Value Ref Range    Heparin 10A Level 0.31 IU/mL   Blood gas venous with oxyhemoglobin " (Every 4 Hrs x4)    Collection Time: 02/09/20  4:35 PM   Result Value Ref Range    Ph Venous 7.39 7.32 - 7.43 pH    PCO2 Venous 51 (H) 40 - 50 mm Hg    PO2 Venous 33 25 - 47 mm Hg    Bicarbonate Venous 31 (H) 21 - 28 mmol/L    FIO2 4L     Oxyhemoglobin Venous 59 %    Base Excess Venous 4.6 mmol/L   Lactic acid whole blood    Collection Time: 02/09/20  4:35 PM   Result Value Ref Range    Lactic Acid 0.6 (L) 0.7 - 2.0 mmol/L   Basic metabolic panel    Collection Time: 02/09/20  4:36 PM   Result Value Ref Range    Sodium 136 133 - 144 mmol/L    Potassium 3.9 3.4 - 5.3 mmol/L    Chloride 100 94 - 109 mmol/L    Carbon Dioxide 29 20 - 32 mmol/L    Anion Gap 6 3 - 14 mmol/L    Glucose 155 (H) 70 - 99 mg/dL    Urea Nitrogen 42 (H) 7 - 30 mg/dL    Creatinine 1.70 (H) 0.66 - 1.25 mg/dL    GFR Estimate 41 (L) >60 mL/min/[1.73_m2]    GFR Estimate If Black 47 (L) >60 mL/min/[1.73_m2]    Calcium 8.4 (L) 8.5 - 10.1 mg/dL   Magnesium    Collection Time: 02/09/20  4:36 PM   Result Value Ref Range    Magnesium 2.2 1.6 - 2.3 mg/dL   Glucose by meter    Collection Time: 02/09/20  5:31 PM   Result Value Ref Range    Glucose 180 (H) 70 - 99 mg/dL   Glucose by meter    Collection Time: 02/09/20  9:43 PM   Result Value Ref Range    Glucose 155 (H) 70 - 99 mg/dL   Blood gas venous with oxyhemoglobin (1200)    Collection Time: 02/10/20 12:07 AM   Result Value Ref Range    Ph Venous 7.41 7.32 - 7.43 pH    PCO2 Venous 49 40 - 50 mm Hg    PO2 Venous 31 25 - 47 mm Hg    Bicarbonate Venous 31 (H) 21 - 28 mmol/L    FIO2 4L     Oxyhemoglobin Venous 56 %    Base Excess Venous 5.0 mmol/L   Glucose by meter    Collection Time: 02/10/20  2:08 AM   Result Value Ref Range    Glucose 167 (H) 70 - 99 mg/dL   Lactic acid whole blood    Collection Time: 02/10/20  2:56 AM   Result Value Ref Range    Lactic Acid 0.6 (L) 0.7 - 2.0 mmol/L   Basic metabolic panel    Collection Time: 02/10/20  2:56 AM   Result Value Ref Range    Sodium 135 133 - 144 mmol/L     Potassium 3.9 3.4 - 5.3 mmol/L    Chloride 102 94 - 109 mmol/L    Carbon Dioxide 28 20 - 32 mmol/L    Anion Gap 6 3 - 14 mmol/L    Glucose 157 (H) 70 - 99 mg/dL    Urea Nitrogen 40 (H) 7 - 30 mg/dL    Creatinine 1.57 (H) 0.66 - 1.25 mg/dL    GFR Estimate 45 (L) >60 mL/min/[1.73_m2]    GFR Estimate If Black 52 (L) >60 mL/min/[1.73_m2]    Calcium 8.5 8.5 - 10.1 mg/dL   Heparin Xa (10a) Level    Collection Time: 02/10/20  2:56 AM   Result Value Ref Range    Heparin 10A Level 0.19 IU/mL   CBC (1200)    Collection Time: 02/10/20  2:56 AM   Result Value Ref Range    WBC 6.9 4.0 - 11.0 10e9/L    RBC Count 4.81 4.4 - 5.9 10e12/L    Hemoglobin 13.5 13.3 - 17.7 g/dL    Hematocrit 41.9 40.0 - 53.0 %    MCV 87 78 - 100 fl    MCH 28.1 26.5 - 33.0 pg    MCHC 32.2 31.5 - 36.5 g/dL    RDW 18.1 (H) 10.0 - 15.0 %    Platelet Count 161 150 - 450 10e9/L   Magnesium    Collection Time: 02/10/20  2:56 AM   Result Value Ref Range    Magnesium 2.1 1.6 - 2.3 mg/dL   Phosphorus    Collection Time: 02/10/20  2:56 AM   Result Value Ref Range    Phosphorus 3.7 2.5 - 4.5 mg/dL   Blood gas venous with oxyhemoglobin (PCU Collect TBD)    Collection Time: 02/10/20  7:59 AM   Result Value Ref Range    Ph Venous 7.38 7.32 - 7.43 pH    PCO2 Venous 53 (H) 40 - 50 mm Hg    PO2 Venous 32 25 - 47 mm Hg    Bicarbonate Venous 31 (H) 21 - 28 mmol/L    FIO2 4L     Oxyhemoglobin Venous 54 %    Base Excess Venous 4.5 mmol/L   Glucose by meter    Collection Time: 02/10/20  8:57 AM   Result Value Ref Range    Glucose 145 (H) 70 - 99 mg/dL       Recent Results (from the past 24 hour(s))   Echocardiogram Complete    Narrative    [id number]  [id number]  [id number]  [id number]^MATT^NAHUN^JIM           [facility],[place]  Echocardiography Laboratory  [address] [place] [address]     Name: WOLFGANG LEACH  MRN: [id number]  : 1955  Study Date: 2020 10:06 AM  Age: 64 yrs  Gender: Male  Patient Location: [facility]  Reason For  Study: Heart Failure  Ordering Physician: NAHUN GUTIERREZ  Referring Physician: SELF, REFERRED  Performed By: Yvonne Adan RDCS     BSA: 2.2 m2  Height: 67 in  Weight: 244 lb  HR: 103  BP: 72/52 mmHg  _____________________________________________________________________________  __        Procedure  Complete Portable Echo Adult. Contrast Optison. Optison (NDC #5260-7110-42)  given intravenously. Patient was given 6 ml mixture of 3 ml Optison and 6 ml  saline. 3 ml wasted.  _____________________________________________________________________________  __        Interpretation Summary  Left ventricular size is normal.  LVEF 20% based on biplane 2D tracing.  Mild to moderate right ventricular dilation is present.  Global right ventricular function is moderately reduced.  Pulmonary artery systolic pressure is normal.  Dilation of the inferior vena cava is present with abnormal respiratory  variation in diameter.  _____________________________________________________________________________  __        Left Ventricle  Left ventricular size is normal. LVEF 20% based on biplane 2D tracing. Left  ventricular wall thickness is normal. Diastolic function not assessed due to  frequent ectopy. Abnormal septal motion consistent with left bundle branch  block is present.     Right Ventricle  Mild to moderate right ventricular dilation is present. Global right  ventricular function is moderately reduced.     Atria  Mild biatrial enlargement is present.     Mitral Valve  Moderate mitral insufficiency is present.        Aortic Valve  Aortic valve is normal in structure and function.     Tricuspid Valve  Moderate tricuspid insufficiency is present. The right ventricular systolic  pressure is approximated at 24.4 mmHg plus the right atrial pressure.  Pulmonary artery systolic pressure is normal.     Pulmonic Valve  The pulmonic valve cannot be assessed. Mild pulmonic insufficiency is present.     Vessels  The aorta root is normal. The  pulmonary artery cannot be assessed. Dilation of  the inferior vena cava is present with abnormal respiratory variation in  diameter.     Compared to Previous Study  Previous study not available for comparison.     _____________________________________________________________________________  __  MMode/2D Measurements & Calculations  IVSd: 0.61 cm  LVIDd: 4.7 cm  LVIDs: 3.7 cm  LVPWd: 0.75 cm  FS: 21.7 %  LV mass(C)d: 99.1 grams  LV mass(C)dI: 45.0 grams/m2     Ao root diam: 2.9 cm  asc Aorta Diam: 2.5 cm  LVOT diam: 1.9 cm  LVOT area: 2.8 cm2     EF(MOD-bp): 21.5 %  LA Volume (BP): 84.8 ml  LA Volume Index (BP): 38.5 ml/m2  RWT: 0.32        Doppler Measurements & Calculations  PA acc time: 0.09 sec     PI end-d saumya: 154.0 cm/sec  TR max saumya: 247.0 cm/sec  TR max P.4 mmHg     _____________________________________________________________________________  __           Report approved by: Graciela Tomlinson 2020 12:05 PM      XR Chest Port 1 View    Narrative    XR CHEST PORT 1 VW  2020 4:21 PM      HISTORY: SG Placement    COMPARISON: None available    FINDINGS: Single AP chest radiograph. [place]-Chucky catheter tip is in the  proximal right main pulmonary artery. Right central line tip and right  upper extremity PICC line tip at the lower SVC. Trachea is midline,  cardiomegaly. Bilateral perihilar streaky opacities. Mild increased  interstitial opacities in the right lung with ill-defined pulmonary  vascularity. No pneumothorax or pleural effusion. No acute osseous or  abdominal abnormality. Elevated left hemidiaphragm.      Impression    IMPRESSION:  1. [place]-Chucky catheter tip at the proximal right main pulmonary artery.  2. Cardiomegaly with mild pulmonary edema, especially on the right.    I have personally reviewed the examination and initial interpretation  and I agree with the findings.    DONG SOLORIO MD   Cardiac Catheterization    Narrative      Successful insertion of a IABP in the RFA                 Medications     Current Facility-Administered Medications   Medication     acetaminophen (TYLENOL) tablet 650 mg     albuterol (PROAIR HFA/PROVENTIL HFA/VENTOLIN HFA) 108 (90 Base) MCG/ACT inhaler 2 puff     allopurinol (ZYLOPRIM) tablet 200 mg     amiodarone (PACERONE/CODARONE) tablet 400 mg     aspirin (ASA) chewable tablet 81 mg     atorvastatin (LIPITOR) tablet 20 mg     [START ON 2/11/2020] bumetanide (BUMEX) injection 4 mg     calcium carbonate (TUMS) chewable tablet 500 mg     co-enzyme Q-10 capsule 200 mg     glucose gel 15-30 g    Or     dextrose 50 % injection 25-50 mL    Or     glucagon injection 1 mg     diclofenac (VOLTAREN) 1 % topical gel 4 g     diphenhydrAMINE (BENADRYL) injection 50 mg     DOBUTamine (DOBUTREX) 1,000 mg in D5W 250 mL infusion (adult max conc)     DOPamine 400 mg in dextrose 5% 250 mL (adult std) - premix     EPINEPHrine (ADRENALIN) kit 0.3 mg     fentaNYL (PF) (SUBLIMAZE) injection 25 mcg     FLUoxetine (PROzac) capsule 20 mg     heparin  drip 25,000 units in 0.45% NaCl 250 mL  ANTICOAGULANT  (see additional administration details for dose)     heparin bolus from infusion pump     insulin aspart (NovoLOG) inj (RAPID ACTING)     insulin aspart (NovoLOG) inj (RAPID ACTING)     insulin glargine (LANTUS PEN) injection 10 Units     iron sucrose (VENOFER) 200 mg in sodium chloride 0.9 % 100 mL intermittent infusion     lidocaine (LMX4) cream     lidocaine 1 % 0.1-1 mL     magnesium oxide (MAG-OX) tablet 400 mg     magnesium sulfate 2 g in water intermittent infusion     magnesium sulfate 4 g in 100 mL sterile water (premade)     medication instruction     methylPREDNISolone sodium succinate (solu-MEDROL) injection 125 mg     naloxone (NARCAN) injection 0.1-0.4 mg     omeprazole (priLOSEC) CR capsule 20 mg     potassium chloride (KLOR-CON) Packet 20-40 mEq     potassium chloride 10 mEq in 100 mL intermittent infusion with 10 mg lidocaine     potassium chloride 10 mEq in 100 mL  sterile water intermittent infusion (premix)     potassium chloride 20 mEq in 50 mL intermittent infusion     potassium chloride ER (K-DUR/KLOR-CON M) CR tablet 20-40 mEq     potassium phosphate 10 mmol in D5W 250 mL intermittent infusion     potassium phosphate 15 mmol in D5W 250 mL intermittent infusion     potassium phosphate 20 mmol in D5W 250 mL intermittent infusion     potassium phosphate 20 mmol in D5W 500 mL intermittent infusion     potassium phosphate 25 mmol in D5W 500 mL intermittent infusion     ranitidine (ZANTAC) injection 50 mg     sodium chloride (PF) 0.9% PF flush 3 mL     sodium chloride (PF) 0.9% PF flush 3 mL     vitamin B1 (THIAMINE) tablet 100 mg

## 2020-02-10 NOTE — PROGRESS NOTES
"Met with patient, wife to present the HM3/HW VAD(s) as possible treatment option.     Discussed patient and caregiver responsibilities before and after VAD implant.  Clarified the need for a caregiver to be present for education and to assist patient for at least first 30 days after a patient returns home. Patient's designated caregiver is wife Jed.     Discussed basic overview of VAD equipment, on going management, need for anticoagulation, regular dressing change, grounded three-pronged outlet and functioning telephone. Also discussed frequency of follow-up clinic appointments and the need to stay locally for at least 2-4 weeks.  Reviewed restrictions of having a VAD such as no swimming, bathing, boats, MRI's, or arc welding.     Provided and discussed the VAD educational brochures, information regarding the VAD and transplant support groups, information on INTERMACs registry, and \"VAD What You Should Know\" with additional details. Patient and wife signed \"VAD What You Should Know\" document. VAD coordinator contact information provided.  Encouraged patient, wife to call with questions.     "

## 2020-02-10 NOTE — PLAN OF CARE
ICU End of Shift Summary. See flowsheets for vital signs and detailed assessment.    Changes this shift: Map >65. IABP 100% augmentation 1:1. CI 1.7 CO 3.9. SVR 1500. LS diminshed. Guevara output 100cc/hr. Complaining of L wrist pain given PRN tylenol.     Plan: Continue POC.

## 2020-02-10 NOTE — PLAN OF CARE
Major Shift Events:   Complaints of L wrist pain due to arthritis. HR 90s-100s w/ frequent PVCs. MAP 70s-80s. IABP 1:1 100%. See flowsheets for latest hemodynamic numbers & gtt rates. Lytes replaced.   Plan: Continue LVAD workup, monitor lytes, hemodynamics q8hrs.   For vital signs and complete assessments, please see documentation flowsheets.

## 2020-02-10 NOTE — PROGRESS NOTES
Palliative Care Consult Team  2/10/2020    Received consult for LVAD preparedness planning.   Please contact us at [phone] once patient has completed show and tell.     We are available for LVAD preparedness planning M-F 8:30-4:30.     Thank you for involving us in the care of this patient.     Sada Saravia MD

## 2020-02-11 ENCOUNTER — APPOINTMENT (OUTPATIENT)
Dept: ULTRASOUND IMAGING | Facility: CLINIC | Age: 65
DRG: 001 | End: 2020-02-11
Attending: STUDENT IN AN ORGANIZED HEALTH CARE EDUCATION/TRAINING PROGRAM
Payer: MEDICARE

## 2020-02-11 ENCOUNTER — APPOINTMENT (OUTPATIENT)
Dept: GENERAL RADIOLOGY | Facility: CLINIC | Age: 65
DRG: 001 | End: 2020-02-11
Attending: STUDENT IN AN ORGANIZED HEALTH CARE EDUCATION/TRAINING PROGRAM
Payer: MEDICARE

## 2020-02-11 ENCOUNTER — DOCUMENTATION ONLY (OUTPATIENT)
Dept: CARDIOLOGY | Facility: CLINIC | Age: 65
End: 2020-02-11

## 2020-02-11 LAB
ABO + RH BLD: NORMAL
ABO + RH BLD: NORMAL
ANION GAP SERPL CALCULATED.3IONS-SCNC: 3 MMOL/L (ref 3–14)
ANION GAP SERPL CALCULATED.3IONS-SCNC: 7 MMOL/L (ref 3–14)
APTT PPP: 57 SEC (ref 22–37)
BASE EXCESS BLDV CALC-SCNC: 10.5 MMOL/L
BASE EXCESS BLDV CALC-SCNC: 11.2 MMOL/L
BASE EXCESS BLDV CALC-SCNC: 8.2 MMOL/L
BASE EXCESS BLDV CALC-SCNC: 8.5 MMOL/L
BLD GP AB SCN SERPL QL: NORMAL
BLOOD BANK CMNT PATIENT-IMP: NORMAL
BUN SERPL-MCNC: 34 MG/DL (ref 7–30)
BUN SERPL-MCNC: 37 MG/DL (ref 7–30)
CALCIUM SERPL-MCNC: 8.5 MG/DL (ref 8.5–10.1)
CALCIUM SERPL-MCNC: 8.8 MG/DL (ref 8.5–10.1)
CHLORIDE SERPL-SCNC: 101 MMOL/L (ref 94–109)
CHLORIDE SERPL-SCNC: 98 MMOL/L (ref 94–109)
CO2 SERPL-SCNC: 32 MMOL/L (ref 20–32)
CO2 SERPL-SCNC: 35 MMOL/L (ref 20–32)
CREAT SERPL-MCNC: 1.15 MG/DL (ref 0.66–1.25)
CREAT SERPL-MCNC: 1.35 MG/DL (ref 0.66–1.25)
ERYTHROCYTE [DISTWIDTH] IN BLOOD BY AUTOMATED COUNT: 18.3 % (ref 10–15)
GFR SERPL CREATININE-BSD FRML MDRD: 54 ML/MIN/{1.73_M2}
GFR SERPL CREATININE-BSD FRML MDRD: 66 ML/MIN/{1.73_M2}
GLUCOSE BLDC GLUCOMTR-MCNC: 118 MG/DL (ref 70–99)
GLUCOSE BLDC GLUCOMTR-MCNC: 147 MG/DL (ref 70–99)
GLUCOSE BLDC GLUCOMTR-MCNC: 157 MG/DL (ref 70–99)
GLUCOSE BLDC GLUCOMTR-MCNC: 171 MG/DL (ref 70–99)
GLUCOSE SERPL-MCNC: 121 MG/DL (ref 70–99)
GLUCOSE SERPL-MCNC: 150 MG/DL (ref 70–99)
HCO3 BLDV-SCNC: 34 MMOL/L (ref 21–28)
HCO3 BLDV-SCNC: 35 MMOL/L (ref 21–28)
HCO3 BLDV-SCNC: 36 MMOL/L (ref 21–28)
HCO3 BLDV-SCNC: 37 MMOL/L (ref 21–28)
HCT VFR BLD AUTO: 41.3 % (ref 40–53)
HGB BLD-MCNC: 13.2 G/DL (ref 13.3–17.7)
LA PPP-IMP: NEGATIVE
LMWH PPP CHRO-ACNC: 0.29 IU/ML
MCH RBC QN AUTO: 28.3 PG (ref 26.5–33)
MCHC RBC AUTO-ENTMCNC: 32 G/DL (ref 31.5–36.5)
MCV RBC AUTO: 88 FL (ref 78–100)
O2/TOTAL GAS SETTING VFR VENT: ABNORMAL %
OXYHGB MFR BLDV: 50 %
OXYHGB MFR BLDV: 61 %
OXYHGB MFR BLDV: 62 %
OXYHGB MFR BLDV: 63 %
PCO2 BLDV: 49 MM HG (ref 40–50)
PCO2 BLDV: 49 MM HG (ref 40–50)
PCO2 BLDV: 52 MM HG (ref 40–50)
PCO2 BLDV: 53 MM HG (ref 40–50)
PH BLDV: 7.43 PH (ref 7.32–7.43)
PH BLDV: 7.43 PH (ref 7.32–7.43)
PH BLDV: 7.48 PH (ref 7.32–7.43)
PH BLDV: 7.48 PH (ref 7.32–7.43)
PLATELET # BLD AUTO: 133 10E9/L (ref 150–450)
PO2 BLDV: 28 MM HG (ref 25–47)
PO2 BLDV: 33 MM HG (ref 25–47)
PO2 BLDV: 34 MM HG (ref 25–47)
PO2 BLDV: 35 MM HG (ref 25–47)
POTASSIUM SERPL-SCNC: 3.7 MMOL/L (ref 3.4–5.3)
POTASSIUM SERPL-SCNC: 3.7 MMOL/L (ref 3.4–5.3)
RBC # BLD AUTO: 4.67 10E12/L (ref 4.4–5.9)
SODIUM SERPL-SCNC: 136 MMOL/L (ref 133–144)
SODIUM SERPL-SCNC: 139 MMOL/L (ref 133–144)
SPECIMEN EXP DATE BLD: NORMAL
WBC # BLD AUTO: 6 10E9/L (ref 4–11)

## 2020-02-11 PROCEDURE — 85027 COMPLETE CBC AUTOMATED: CPT | Performed by: INTERNAL MEDICINE

## 2020-02-11 PROCEDURE — 00000146 ZZHCL STATISTIC GLUCOSE BY METER IP

## 2020-02-11 PROCEDURE — 93931 UPPER EXTREMITY STUDY: CPT

## 2020-02-11 PROCEDURE — 25000132 ZZH RX MED GY IP 250 OP 250 PS 637: Mod: GY | Performed by: STUDENT IN AN ORGANIZED HEALTH CARE EDUCATION/TRAINING PROGRAM

## 2020-02-11 PROCEDURE — 99222 1ST HOSP IP/OBS MODERATE 55: CPT | Performed by: CLINICAL NURSE SPECIALIST

## 2020-02-11 PROCEDURE — 25000132 ZZH RX MED GY IP 250 OP 250 PS 637: Mod: GY

## 2020-02-11 PROCEDURE — 20000004 ZZH R&B ICU UMMC

## 2020-02-11 PROCEDURE — 40000196 ZZH STATISTIC RAPCV CVP MONITORING

## 2020-02-11 PROCEDURE — 40000014 ZZH STATISTIC ARTERIAL MONITORING DAILY

## 2020-02-11 PROCEDURE — 25000128 H RX IP 250 OP 636: Performed by: STUDENT IN AN ORGANIZED HEALTH CARE EDUCATION/TRAINING PROGRAM

## 2020-02-11 PROCEDURE — 25800030 ZZH RX IP 258 OP 636: Performed by: STUDENT IN AN ORGANIZED HEALTH CARE EDUCATION/TRAINING PROGRAM

## 2020-02-11 PROCEDURE — 86850 RBC ANTIBODY SCREEN: CPT | Performed by: INTERNAL MEDICINE

## 2020-02-11 PROCEDURE — 99233 SBSQ HOSP IP/OBS HIGH 50: CPT | Mod: GC | Performed by: INTERNAL MEDICINE

## 2020-02-11 PROCEDURE — 40000048 ZZH STATISTIC DAILY SWAN MONITORING

## 2020-02-11 PROCEDURE — 86022 PLATELET ANTIBODIES: CPT | Performed by: STUDENT IN AN ORGANIZED HEALTH CARE EDUCATION/TRAINING PROGRAM

## 2020-02-11 PROCEDURE — 86900 BLOOD TYPING SEROLOGIC ABO: CPT | Performed by: INTERNAL MEDICINE

## 2020-02-11 PROCEDURE — 85018 HEMOGLOBIN: CPT | Performed by: STUDENT IN AN ORGANIZED HEALTH CARE EDUCATION/TRAINING PROGRAM

## 2020-02-11 PROCEDURE — 40000275 ZZH STATISTIC RCP TIME EA 10 MIN

## 2020-02-11 PROCEDURE — 86901 BLOOD TYPING SEROLOGIC RH(D): CPT | Performed by: INTERNAL MEDICINE

## 2020-02-11 PROCEDURE — 85730 THROMBOPLASTIN TIME PARTIAL: CPT | Performed by: INTERNAL MEDICINE

## 2020-02-11 PROCEDURE — 40000076 ZZH STATISTIC IABP MONITORING

## 2020-02-11 PROCEDURE — 80048 BASIC METABOLIC PNL TOTAL CA: CPT | Performed by: INTERNAL MEDICINE

## 2020-02-11 PROCEDURE — 85520 HEPARIN ASSAY: CPT | Performed by: INTERNAL MEDICINE

## 2020-02-11 PROCEDURE — 25000132 ZZH RX MED GY IP 250 OP 250 PS 637: Mod: GY | Performed by: INTERNAL MEDICINE

## 2020-02-11 PROCEDURE — 82805 BLOOD GASES W/O2 SATURATION: CPT | Performed by: STUDENT IN AN ORGANIZED HEALTH CARE EDUCATION/TRAINING PROGRAM

## 2020-02-11 PROCEDURE — 71045 X-RAY EXAM CHEST 1 VIEW: CPT

## 2020-02-11 RX ORDER — BUMETANIDE 0.25 MG/ML
4 INJECTION INTRAMUSCULAR; INTRAVENOUS ONCE
Status: COMPLETED | OUTPATIENT
Start: 2020-02-11 | End: 2020-02-11

## 2020-02-11 RX ADMIN — OMEPRAZOLE 20 MG: 20 CAPSULE, DELAYED RELEASE ORAL at 07:58

## 2020-02-11 RX ADMIN — FLUOXETINE 20 MG: 20 CAPSULE ORAL at 07:58

## 2020-02-11 RX ADMIN — ALLOPURINOL 200 MG: 100 TABLET ORAL at 07:58

## 2020-02-11 RX ADMIN — THIAMINE HCL TAB 100 MG 100 MG: 100 TAB at 07:58

## 2020-02-11 RX ADMIN — ASPIRIN 81 MG CHEWABLE TABLET 81 MG: 81 TABLET CHEWABLE at 08:03

## 2020-02-11 RX ADMIN — DOBUTAMINE 7 MCG/KG/MIN: 12.5 INJECTION, SOLUTION INTRAVENOUS at 10:39

## 2020-02-11 RX ADMIN — POTASSIUM CHLORIDE 20 MEQ: 750 TABLET, EXTENDED RELEASE ORAL at 06:10

## 2020-02-11 RX ADMIN — MAGNESIUM OXIDE 400 MG: 400 TABLET ORAL at 07:58

## 2020-02-11 RX ADMIN — ATORVASTATIN CALCIUM 20 MG: 20 TABLET, FILM COATED ORAL at 19:37

## 2020-02-11 RX ADMIN — INSULIN GLARGINE 10 UNITS: 100 INJECTION, SOLUTION SUBCUTANEOUS at 21:31

## 2020-02-11 RX ADMIN — DICLOFENAC 4 G: 10 GEL TOPICAL at 15:58

## 2020-02-11 RX ADMIN — IRON SUCROSE 200 MG: 20 INJECTION, SOLUTION INTRAVENOUS at 09:18

## 2020-02-11 RX ADMIN — DICLOFENAC 4 G: 10 GEL TOPICAL at 19:40

## 2020-02-11 RX ADMIN — DICLOFENAC 4 G: 10 GEL TOPICAL at 07:59

## 2020-02-11 RX ADMIN — BUMETANIDE 4 MG: 0.25 INJECTION INTRAMUSCULAR; INTRAVENOUS at 07:58

## 2020-02-11 RX ADMIN — AMIODARONE HYDROCHLORIDE 400 MG: 200 TABLET ORAL at 07:58

## 2020-02-11 RX ADMIN — DICLOFENAC 4 G: 10 GEL TOPICAL at 12:04

## 2020-02-11 RX ADMIN — POTASSIUM CHLORIDE 20 MEQ: 750 TABLET, EXTENDED RELEASE ORAL at 15:57

## 2020-02-11 RX ADMIN — ACETAMINOPHEN 650 MG: 325 TABLET, FILM COATED ORAL at 15:58

## 2020-02-11 RX ADMIN — Medication 200 MG: at 07:59

## 2020-02-11 RX ADMIN — AMIODARONE HYDROCHLORIDE 400 MG: 200 TABLET ORAL at 19:37

## 2020-02-11 RX ADMIN — DOPAMINE HYDROCHLORIDE IN DEXTROSE 2 MCG/KG/MIN: 1.6 INJECTION, SOLUTION INTRAVENOUS at 17:48

## 2020-02-11 RX ADMIN — BUMETANIDE 4 MG: 0.25 INJECTION INTRAMUSCULAR; INTRAVENOUS at 09:52

## 2020-02-11 RX ADMIN — ARGATROBAN 0.5 MCG/KG/MIN: 1 INJECTION INTRAVENOUS at 14:56

## 2020-02-11 ASSESSMENT — ACTIVITIES OF DAILY LIVING (ADL)
ADLS_ACUITY_SCORE: 14

## 2020-02-11 ASSESSMENT — MIFFLIN-ST. JEOR: SCORE: 1786.63

## 2020-02-11 ASSESSMENT — PAIN DESCRIPTION - DESCRIPTORS: DESCRIPTORS: ACHING

## 2020-02-11 NOTE — PLAN OF CARE
Major Shift Events:  Continues on dobutamine, dopamine, and IABP support. VSS. Good urine output. Hemodynamics essentially unchanged.  Plan: Continue LVAD workup. Neuropsych eval today 1300  For vital signs and complete assessments, please see documentation flowsheets.

## 2020-02-11 NOTE — PLAN OF CARE
PT 4E: Orders for 6MWT received, patient unable to perform test due to femoral IABP. Will defer 6MWT and sign off at this time, Please re-order if/when appropriate.

## 2020-02-11 NOTE — PROGRESS NOTES
Cardiology Progress Note  Eliseo Tanner MRN: [id number]  Age: 64 year old, : 1955  Date: 2020      Critical Care  Gucci Tomas    I personally saw and examined this patient and agree with the findings, assessment, and plan as outlined by the housestaff this day.  The patient remains in the ICU secondary to advanced circulatory disease characterized by shock, the need for parenteral  inotropic agents and/or vasopressors to maintain blood pressure and organ perfusion., the careful assessment of renal function in response to low cardiac output state and need for substantial volumes of fluid for drug administration.  I personally assessed.  the logic and continuity of care plan over the preceding twenty four hours, assessed the continuing need for parenteral and oral medications and their appropriate dosing in the context of circulatory status and renal function.  I reviewed all interim laboratory and imaging.  I coordinated care with nursing staff and consultants. I disucssed the patient's status with the patient and or family.  Patient has evidence of evolving sepsis with renewed issues as described below.  45 minutes         Assessment and Plan:     Mr Eliseo Tanner is a 65yo M w/PMHx notable for chronic systolic heart failure, NICM, moderate CAD, HTN, ABHINAV on CPAP, DM2, CKDIII who is transferred to [facility] for evaluation and management of advanced heart failure with arrhythmia.     Today's plan ():  -Continue diuresis with goal negative 1.0L; 4 mg IV Bumex once in AM, reassess HDs in PM  -Continue dopamine 2, dobutamine 7, IABP at 1:1  -Starting work-up for heart transplant    HDs for :  CVP 13  PA 50/30  PCWP 28  PA sat 63%  CI 4.2/1.9  SVR 1743  PVR 2.1  Hgb 1743    Moderate CAD  Severe Mitral regurgitation  Chronic nonischemic systolic heart failure w/ reduced EF (15-20%) and exacerbation  NYHA Class III. Echo 2020: LVEF 15-20%; LVEDd 5.9 cm; 4+ MR. [facility]  11/2019 w/ nonobstructive CAD w/ severe branch disease. Presented with increased wt gain, SOB, LE edema concerning for HF exacerbation, initially concerning for cardiogenic shock. Admitted to [facility] for further evaluation regarding need for advanced HF therapies.   -Financial approval obtained for OHT/LVAD w/u; order set has been placed  -Triangle placed: hemodynamics w/ Jane CO/CI q6h  -Telemetry  -Lactic acid, VBG ordered; trend  -Beta-blocker: None due to decompensated HF  -ACEi/ARB/ARNI: Holding home Entresto  mg BID  -Aldosterone antagonist: Holding spironolactone until renal assessed   -Volume status: Hypervolemic on exam. Weight on admit 240. Baseline weight 225-230.  -Diuretic regimen: for 2/9: Bumex 4 mg IV QD  -Continue dopamine 2, dobutmaine 6  -Continue ASA 81, atorvastatin 20 mg    BERNARDA on CKDIII  Cr on admission 1.7, baseline Cr 1.5-2. Goal to improve renal function with diuresis in order to make best candidate for potential LVAD.  -Trend Cr  -Daily fluid balance  -Diuresis as above    Precapillary pulmonary hypertension:  Significant transpulmonary gradient of 14 on right heart cath numbers. May be related to CTEPH vs. WHO I.  -Empiric heparin ggt low intensity      VFib requiring shock  Shocked x 3 prior to transfer, loaded with amio. No known prior history. Suspect related to underlying HF.   -Keep K>4, Mg>2  -Amio 400 mg PO BID  -Need ICD for secondary prevention     Diabetes Mellitus Type II  Last A1c 7.2% 2/6/20  Home regimen includes: 15U basaglar at bedtime, glipizide 2.5  -Will resume home glargine at 30% reduction, 10U QHS  -Holding home oral regimen while renal function and/or hemodynamic status is either impaired or threatened  -Preprandial blood glucose target <140 mg/dL and random <180mg/dl, or 140-180 mg/dL for critically ill  -SSI insulin, q4h blood sugar checks, hypoglycemia protocol ordered     Chronic:  ABHINAV: Home CPAP  Gout: PTA allopurinol 200 daily  MDD: PTA fluoxetine  GERD: PTA  "PPI  COPD: albuterol PRN    FEN:  2gm Na   2L water restriction    PPX: Heparin ggt    CODE: FULL CODE     Patient was discussed with staff attending, Dr. Tomas, who agrees with the above assessment and plan.      Stanford Acuna MD  Cardiology Fellow, PGY-5  Pager: [phone]            Subjective/Interval Events     No acute overnight evens.  This AM, patient w/o any acute complaints. Denying any lightheadedness, dizziness, SOB.          Objective     BP (!) 86/58   Pulse 102   Temp 97.4  F (36.3  C) (Oral)   Resp 18   Ht 1.702 m (5' 7\")   Wt 104.8 kg (231 lb 0.7 oz)   SpO2 96%   BMI 36.19 kg/m    Temp:  [97.4  F (36.3  C)-98  F (36.7  C)] 97.4  F (36.3  C)  Heart Rate:  [71-98] 92  Resp:  [14-26] 18  MAP:  [69 mmHg-101 mmHg] 91 mmHg  Arterial Line BP: ()/(47-74) 136/60  SpO2:  [91 %-100 %] 96 %  Wt Readings from Last 2 Encounters:   02/11/20 104.8 kg (231 lb 0.7 oz)     I/O last 3 completed shifts:  In: 1964.14 [P.O.:890; I.V.:1074.14]  Out: 4900 [Urine:4900]      Gen: No acute distress  HEENT: NC/AT, PERRL, EOM intact, MMM, OP without exudates  PULM/THORAX: Clear to auscultation bilaterally, no rales/rhonchi/wheezes  CV: normal rate, regular rhythm, normal S1 and S2, no murmurs or rubs.  ABD: Soft, NTND, bowel sounds present, no masses  EXT: WWP. No LE edema, clubbing or cyanosis.  NEURO: CN II-XII grossly intact. A&Ox3    Lines:  -R IJ PA catheter              Data:     Recent Results (from the past 24 hour(s))   Glucose by meter    Collection Time: 02/10/20  8:57 AM   Result Value Ref Range    Glucose 145 (H) 70 - 99 mg/dL   Blood gas venous with oxyhemoglobin (PCU Collect TBD)    Collection Time: 02/10/20 11:57 AM   Result Value Ref Range    Ph Venous 7.39 7.32 - 7.43 pH    PCO2 Venous 53 (H) 40 - 50 mm Hg    PO2 Venous 30 25 - 47 mm Hg    Bicarbonate Venous 32 (H) 21 - 28 mmol/L    FIO2 4L     Oxyhemoglobin Venous 51 %    Base Excess Venous 5.7 mmol/L   Glucose by meter    Collection Time: " 02/10/20  1:14 PM   Result Value Ref Range    Glucose 150 (H) 70 - 99 mg/dL   Basic metabolic panel    Collection Time: 02/10/20  3:52 PM   Result Value Ref Range    Sodium 137 133 - 144 mmol/L    Potassium 3.6 3.4 - 5.3 mmol/L    Chloride 100 94 - 109 mmol/L    Carbon Dioxide 32 20 - 32 mmol/L    Anion Gap 5 3 - 14 mmol/L    Glucose 143 (H) 70 - 99 mg/dL    Urea Nitrogen 36 (H) 7 - 30 mg/dL    Creatinine 1.34 (H) 0.66 - 1.25 mg/dL    GFR Estimate 55 (L) >60 mL/min/[1.73_m2]    GFR Estimate If Black 63 >60 mL/min/[1.73_m2]    Calcium 8.6 8.5 - 10.1 mg/dL   Blood gas venous with oxyhemoglobin (PCU Collect TBD)    Collection Time: 02/10/20  3:52 PM   Result Value Ref Range    Ph Venous 7.43 7.32 - 7.43 pH    PCO2 Venous 52 (H) 40 - 50 mm Hg    PO2 Venous 33 25 - 47 mm Hg    Bicarbonate Venous 34 (H) 21 - 28 mmol/L    FIO2 3L     Oxyhemoglobin Venous 58 %    Base Excess Venous 7.9 mmol/L   Glucose by meter    Collection Time: 02/10/20  6:39 PM   Result Value Ref Range    Glucose 140 (H) 70 - 99 mg/dL   Blood gas venous with oxyhemoglobin (PCU Collect TBD)    Collection Time: 02/10/20  7:45 PM   Result Value Ref Range    Ph Venous 7.41 7.32 - 7.43 pH    PCO2 Venous 54 (H) 40 - 50 mm Hg    PO2 Venous 34 25 - 47 mm Hg    Bicarbonate Venous 34 (H) 21 - 28 mmol/L    FIO2 3L     Oxyhemoglobin Venous 60 %    Base Excess Venous 7.9 mmol/L   Glucose by meter    Collection Time: 02/10/20 10:05 PM   Result Value Ref Range    Glucose 163 (H) 70 - 99 mg/dL   Basic metabolic panel    Collection Time: 02/11/20  4:24 AM   Result Value Ref Range    Sodium 139 133 - 144 mmol/L    Potassium 3.7 3.4 - 5.3 mmol/L    Chloride 101 94 - 109 mmol/L    Carbon Dioxide 32 20 - 32 mmol/L    Anion Gap 7 3 - 14 mmol/L    Glucose 121 (H) 70 - 99 mg/dL    Urea Nitrogen 37 (H) 7 - 30 mg/dL    Creatinine 1.35 (H) 0.66 - 1.25 mg/dL    GFR Estimate 54 (L) >60 mL/min/[1.73_m2]    GFR Estimate If Black 63 >60 mL/min/[1.73_m2]    Calcium 8.5 8.5 - 10.1 mg/dL    Heparin Xa (10a) Level    Collection Time: 20  4:24 AM   Result Value Ref Range    Heparin 10A Level 0.29 IU/mL   CBC with platelets    Collection Time: 20  4:24 AM   Result Value Ref Range    WBC 6.0 4.0 - 11.0 10e9/L    RBC Count 4.67 4.4 - 5.9 10e12/L    Hemoglobin 13.2 (L) 13.3 - 17.7 g/dL    Hematocrit 41.3 40.0 - 53.0 %    MCV 88 78 - 100 fl    MCH 28.3 26.5 - 33.0 pg    MCHC 32.0 31.5 - 36.5 g/dL    RDW 18.3 (H) 10.0 - 15.0 %    Platelet Count 133 (L) 150 - 450 10e9/L   Blood gas venous with oxyhemoglobin (PCU Collect TBD)    Collection Time: 20  4:24 AM   Result Value Ref Range    Ph Venous 7.43 7.32 - 7.43 pH    PCO2 Venous 53 (H) 40 - 50 mm Hg    PO2 Venous 34 25 - 47 mm Hg    Bicarbonate Venous 35 (H) 21 - 28 mmol/L    FIO2 3L     Oxyhemoglobin Venous 61 %    Base Excess Venous 8.5 mmol/L   Type and Screen (AM Draw)    Collection Time: 20  4:24 AM   Result Value Ref Range    ABO A     RH(D) Pos     Antibody Screen Neg     Test Valid Only At          [facility]    Specimen Expires 2020    Glucose by meter    Collection Time: 20  8:12 AM   Result Value Ref Range    Glucose 118 (H) 70 - 99 mg/dL       Recent Results (from the past 24 hour(s))   Echocardiogram Complete    Narrative    [id number]  [id number]  [id number]  [id number]^MATT^NAHUN^JIM           [facility]  Echocardiography Laboratory  [address] [place] [id number]     Name: WOLFGANG LEACH  MRN: [id number]  : 1955  Study Date: 2020 10:06 AM  Age: 64 yrs  Gender: Male  Patient Location: [facility]  Reason For Study: Heart Failure  Ordering Physician: NAHUN GUTIERREZ  Referring Physician: SELF, REFERRED  Performed By: Yvonne Adan RDCS     BSA: 2.2 m2  Height: 67 in  Weight: 244 lb  HR: 103  BP: 72/52 mmHg  _____________________________________________________________________________  __        Procedure  Complete  Portable Echo Adult. Contrast Optison. Optison (NDC #6274-3239-47)  given intravenously. Patient was given 6 ml mixture of 3 ml Optison and 6 ml  saline. 3 ml wasted.  _____________________________________________________________________________  __        Interpretation Summary  Left ventricular size is normal.  LVEF 20% based on biplane 2D tracing.  Mild to moderate right ventricular dilation is present.  Global right ventricular function is moderately reduced.  Pulmonary artery systolic pressure is normal.  Dilation of the inferior vena cava is present with abnormal respiratory  variation in diameter.  _____________________________________________________________________________  __        Left Ventricle  Left ventricular size is normal. LVEF 20% based on biplane 2D tracing. Left  ventricular wall thickness is normal. Diastolic function not assessed due to  frequent ectopy. Abnormal septal motion consistent with left bundle branch  block is present.     Right Ventricle  Mild to moderate right ventricular dilation is present. Global right  ventricular function is moderately reduced.     Atria  Mild biatrial enlargement is present.     Mitral Valve  Moderate mitral insufficiency is present.        Aortic Valve  Aortic valve is normal in structure and function.     Tricuspid Valve  Moderate tricuspid insufficiency is present. The right ventricular systolic  pressure is approximated at 24.4 mmHg plus the right atrial pressure.  Pulmonary artery systolic pressure is normal.     Pulmonic Valve  The pulmonic valve cannot be assessed. Mild pulmonic insufficiency is present.     Vessels  The aorta root is normal. The pulmonary artery cannot be assessed. Dilation of  the inferior vena cava is present with abnormal respiratory variation in  diameter.     Compared to Previous Study  Previous study not available for comparison.     _____________________________________________________________________________  __  MMode/2D  Measurements & Calculations  IVSd: 0.61 cm  LVIDd: 4.7 cm  LVIDs: 3.7 cm  LVPWd: 0.75 cm  FS: 21.7 %  LV mass(C)d: 99.1 grams  LV mass(C)dI: 45.0 grams/m2     Ao root diam: 2.9 cm  asc Aorta Diam: 2.5 cm  LVOT diam: 1.9 cm  LVOT area: 2.8 cm2     EF(MOD-bp): 21.5 %  LA Volume (BP): 84.8 ml  LA Volume Index (BP): 38.5 ml/m2  RWT: 0.32        Doppler Measurements & Calculations  PA acc time: 0.09 sec     PI end-d saumya: 154.0 cm/sec  TR max saumya: 247.0 cm/sec  TR max P.4 mmHg     _____________________________________________________________________________  __           Report approved by: Graciela Tomlinson 2020 12:05 PM      XR Chest Port 1 View    Narrative    XR CHEST PORT 1 VW  2020 4:21 PM      HISTORY: SG Placement    COMPARISON: None available    FINDINGS: Single AP chest radiograph. [place]ucky catheter tip is in the  proximal right main pulmonary artery. Right central line tip and right  upper extremity PICC line tip at the lower SVC. Trachea is midline,  cardiomegaly. Bilateral perihilar streaky opacities. Mild increased  interstitial opacities in the right lung with ill-defined pulmonary  vascularity. No pneumothorax or pleural effusion. No acute osseous or  abdominal abnormality. Elevated left hemidiaphragm.      Impression    IMPRESSION:  1. [place]-Chucky catheter tip at the proximal right main pulmonary artery.  2. Cardiomegaly with mild pulmonary edema, especially on the right.    I have personally reviewed the examination and initial interpretation  and I agree with the findings.    DONG SOLORIO MD   Cardiac Catheterization    Narrative      Successful insertion of a IABP in the RFA                Medications     Current Facility-Administered Medications   Medication     acetaminophen (TYLENOL) tablet 650 mg     albuterol (PROAIR HFA/PROVENTIL HFA/VENTOLIN HFA) 108 (90 Base) MCG/ACT inhaler 2 puff     allopurinol (ZYLOPRIM) tablet 200 mg     amiodarone (PACERONE/CODARONE) tablet 400 mg      aspirin (ASA) chewable tablet 81 mg     atorvastatin (LIPITOR) tablet 20 mg     bumetanide (BUMEX) injection 4 mg     calcium carbonate (TUMS) chewable tablet 500 mg     co-enzyme Q-10 capsule 200 mg     glucose gel 15-30 g    Or     dextrose 50 % injection 25-50 mL    Or     glucagon injection 1 mg     diclofenac (VOLTAREN) 1 % topical gel 4 g     diphenhydrAMINE (BENADRYL) injection 50 mg     DOBUTamine (DOBUTREX) 1,000 mg in D5W 250 mL infusion (adult max conc)     DOPamine 400 mg in dextrose 5% 250 mL (adult std) - premix     EPINEPHrine (ADRENALIN) kit 0.3 mg     fentaNYL (PF) (SUBLIMAZE) injection 25 mcg     FLUoxetine (PROzac) capsule 20 mg     heparin  drip 25,000 units in 0.45% NaCl 250 mL  ANTICOAGULANT  (see additional administration details for dose)     heparin bolus from infusion pump     insulin aspart (NovoLOG) inj (RAPID ACTING)     insulin aspart (NovoLOG) inj (RAPID ACTING)     insulin glargine (LANTUS PEN) injection 10 Units     iron sucrose (VENOFER) 200 mg in sodium chloride 0.9 % 100 mL intermittent infusion     lidocaine (LMX4) cream     lidocaine 1 % 0.1-1 mL     magnesium oxide (MAG-OX) tablet 400 mg     magnesium sulfate 2 g in water intermittent infusion     magnesium sulfate 4 g in 100 mL sterile water (premade)     medication instruction     methylPREDNISolone sodium succinate (solu-MEDROL) injection 125 mg     naloxone (NARCAN) injection 0.1-0.4 mg     omeprazole (priLOSEC) CR capsule 20 mg     potassium chloride (KLOR-CON) Packet 20-40 mEq     potassium chloride 10 mEq in 100 mL intermittent infusion with 10 mg lidocaine     potassium chloride 10 mEq in 100 mL sterile water intermittent infusion (premix)     potassium chloride 20 mEq in 50 mL intermittent infusion     potassium chloride ER (K-DUR/KLOR-CON M) CR tablet 20-40 mEq     potassium phosphate 10 mmol in D5W 250 mL intermittent infusion     potassium phosphate 15 mmol in D5W 250 mL intermittent infusion     potassium  phosphate 20 mmol in D5W 250 mL intermittent infusion     potassium phosphate 20 mmol in D5W 500 mL intermittent infusion     potassium phosphate 25 mmol in D5W 500 mL intermittent infusion     ranitidine (ZANTAC) injection 50 mg     sodium chloride (PF) 0.9% PF flush 3 mL     sodium chloride (PF) 0.9% PF flush 3 mL     vitamin B1 (THIAMINE) tablet 100 mg

## 2020-02-11 NOTE — PROGRESS NOTES
SPIRITUAL HEALTH SERVICES  SPIRITUAL ASSESSMENT Progress Note  [facility] 4E     Follow-up  visit with pt and spouse; pt talked about continuing process of transplant evaluation - he is hopeful and optimistic about plan of care. Pt has strong support from his [employer], [place] Farah ([place]), Joya NAGY ( Shukri Lauren:  [phone]). With pt's permission I contacted his  to establish coordination between  and Spiritual Health Services. Prayer was welcomed.    PLAN: continue to follow, visiting again this week.     Ad Krause M.Div ([place])., [facility]  Staff   Pager [phone]

## 2020-02-11 NOTE — CONSULTS
[facility]  Palliative Care Consultation Note    Patient: Eliseo Tanner  Date of Admission:  2/6/2020    Requesting Clinician / Team: Jemal Cantu MD  Reason for consult: Goals of care    Recommendations:    Encouraged him to share his thoughts and wishes with his wife regarding the questions below (such as dialysis, stroke outcomes, tracheostomy) as he has never thought about this before so his answers today were fairly limited    Encourage HCD completion    Completed the preparedness planning questions, see below     These recommendations have been discussed with primary team.      Thank you for the opportunity to participate in the care of this patient and family. Our team: does not plan on following further, however do not hesitate to call or re-consult if we can be of further assistance to the patient/family.     During regular M-F work hours -- if you are not sure who specifically to contact -- please contact us by sending a text page to our team consult pager at [phone].    After regular work hours and on weekends/holidays, you can call our answering service at [phone]. Also, who's on call for us is available in [facility] Smart Web.       Assessments:  Eliseo Tanner is a 64 year old male with chronic systolic heart failure, NICM, moderate CAD, HTN, ABHINAV, DMII, CKDIII, who presented from an OSH with cardiogenic shock, multiple ICD shocks, for evaluation and management of advanced heart failure with arrhythmia.     Today, the patient was seen for:  Cardiogenic shock  Advanced heart disease    I visited with Eliseo today. I introduced palliative care and ways in which we can be helpful including symptom management, decisional support (goals of care), and additional support. Discussed our role in LVAD evaluation. He today did not have any thoughts about unacceptable outcomes, discussed the importance of discussing this with his wife if he does  have any thoughts and the reassurance that can provide his wife should he experience any complications.      LVAD preparedness planning     Patient's legally designated health care agent: does not have a HCD, wife took papers, he does wish to name his wife has his HCA and then likely his sister-in-law.        Patient's description of how they make decisions (by themselves, in consultation with certain close loved ones, based on advice from medical professionals, etc):  he has not had to make any health care decisions before like this. he finds it helpful to talk things over with his wife.        Patient's thoughts about what constitutes a 'good' quality of life/ what makes life worth living: he wants to feel better to be able to do the things he needs to do, he would love to get back to motorcycling and traveling.        Patient's personal goals/hopes for receiving the LVAD and how they anticipate future with LVAD to be like: one of his big goals is to go on a trip to [place].     Patient's worries/concerns when considering receiving the LVAD: he has no worries or concerns at this time. He does acknowledge that it is scary at times.      Patient's thoughts about what health conditions would be unacceptable (situations the patient would want her/his doctors and loved ones to know that she/he would not want life prolonged)? No thoughts at this time, wants to see his grandchildren grow up.        There are risks for kidney failure in patients with advancing heart disease who need LVAD support.  The places/locations that offer dialysis and accept patients with LVADs may be limited in your community.  What do you know about dialysis? Would you be open to doing dialysis and if so what quality of life concerns would you have? No concerns.       An LVAD carries a risk of stroke which, for some people, may limit their ability to communicate their wishes to others.  After a stroke, what sort of outcomes would be unacceptable  to you (ie needing to live in nursing facility, loss of independence.. etc.) No thoughts on this today.        The need to be on a ventilator/breathing machine through a tracheostomy (a tube in your neck) is a risk with LVAD. What are with circumstances you would want your medical providers and family to keep you alive with long term mechanical ventilation? No thoughts on this today.     What circumstances or events would lead you to decide or ask your health care agent to decide that you would want the LVAD turned off and be allowed to die a natural death? Not discussed given his lack of thoughts about the previous questions.    Prognosis, Goals, & Planning:      Functional Status just prior to hospitalization: 1 (Restricted in physically strenuous activity but ambulatory and able to carry out work of a light or sedentary nature)    2 (Ambulatory and capable of all selfcare but unable to carry out any work activities; may need help with IADLs up and about > 50% of waking hours)      Prognosis, Goals, and/or Advance Care Planning were addressed today: Yes        Summary/Comments: he does understand that the doctors are working on improving his heart and kidney numbers so he can be in a better place to get an LVAD.  He thinks that because of his age he is likely not a candidate for transplant and may need to live with the LVAD the rest of his life.       Patient's decision making preferences: shared with support from loved ones          Patient has decision-making capacity today for complex decisions: Yes            I have concerns about the patient/family's health literacy today: No           Patient has a completed Health Care Directive: No.       Code status: full code    Coping, Meaning, & Spirituality:   Mood, coping, and/or meaning in the context of serious illness were addressed today: Yes  Summary/Comments: coping well at this time    Social:     Key family / caregivers: wife, children,  grandchildren    History of Present Illness:  History gathered today from: medical chart    Eliseo Tanner is a 64 year old male with a past medical history of chronic systolic heart failure, NICM, moderate CAD, HTN, ABHINAV, DMII, CKDIII, who presented from an OSH with cardiogenic shock, multiple ICD shocks, for evaluation and management of advanced heart failure with arrhythmia.     Palliative care consulted 2/11    Key Palliative Symptom Data:  We are not helping to manage these symptoms currently in this patient.        ROS:  Comprehensive ROS is reviewed and is negative except as here & per HPI:      Past Medical History:  No past medical history on file.     Past Surgical History:  Past Surgical History:   Procedure Laterality Date     CV INTRA-AORTIC BALLOON PUMP INSERTION N/A 2/7/2020    Procedure: Intra-Aortic Balloon Pump Insertion;  Surgeon: Jose Baldwin MD;  Location:  HEART CARDIAC CATH LAB         Family History:  No family history on file.   Daughter has bipolar disease     Allergies:  Allergies   Allergen Reactions     Oxycodone Itching and Other (See Comments)        Medications:  I have reviewed this patient's medication profile and medications from this hospitalization.   Noted scheduled meds are:  volteran 4 g 4 times a day gel  prozac 20 mg daily  omeprazole 20 mg daily    Dobutamine drip    Noted PRN meds are:  Acetaminophen PRN    Physical Exam:  Vital Signs: Temp: 97.4  F (36.3  C) Temp src: Oral    Heart Rate: 92 Resp: 18 SpO2: 96 % O2 Device: BiPAP/CPAP Oxygen Delivery: 3 LPM  Weight: 231 lbs .67 oz    Constitutional: Awake, alert, cooperative, no apparent distress  ENT: Normocephalic, swan catheter  Lungs: No increased work of breathing  Cardiovascular: Regular rate and rhythm  Abdomen: non-distended  Neurologic: Awake, alert, oriented to name, place and time.   Neuropsychiatric: normal affect  Skin: No rashes to visualized skin    Data reviewed:  Recent lab data reviewed, my  comments on pertinents:   Sodium 139  Potassium 3.7  Creatinine 1.35  GFR 54  WBC 6.0  Hemoglobin 13.2  Platelets 133    JUAN Melo CNS  Palliative Care Consult Team  Pager: [phone]    60 minutes spent. This includes 35 minutes face to face with patient discussing current complex health conditions and prognosis, goals of care, advanced care planning. Coordination of care with the primary team regarding goals of care and advanced care planning concerns.

## 2020-02-12 ENCOUNTER — APPOINTMENT (OUTPATIENT)
Dept: GENERAL RADIOLOGY | Facility: CLINIC | Age: 65
DRG: 001 | End: 2020-02-12
Attending: STUDENT IN AN ORGANIZED HEALTH CARE EDUCATION/TRAINING PROGRAM
Payer: MEDICARE

## 2020-02-12 LAB
ACETAMINOPHEN QUAL: POSITIVE
AMANTADINE: NEGATIVE
AMITRIPTYLINE QUAL: NEGATIVE
AMOXAPINE: NEGATIVE
AMPHETAMINES QUAL: NEGATIVE
ANION GAP SERPL CALCULATED.3IONS-SCNC: 4 MMOL/L (ref 3–14)
ANION GAP SERPL CALCULATED.3IONS-SCNC: 9 MMOL/L (ref 3–14)
APTT PPP: 69 SEC (ref 22–37)
ATROPINE: NEGATIVE
BASE EXCESS BLDV CALC-SCNC: 11.6 MMOL/L
BASE EXCESS BLDV CALC-SCNC: 11.7 MMOL/L
BASE EXCESS BLDV CALC-SCNC: 11.7 MMOL/L
BASE EXCESS BLDV CALC-SCNC: 11.8 MMOL/L
BASOPHILS # BLD AUTO: 0 10E9/L (ref 0–0.2)
BASOPHILS NFR BLD AUTO: 0.3 %
BENZODIAZ UR QL: POSITIVE
BUN SERPL-MCNC: 31 MG/DL (ref 7–30)
BUN SERPL-MCNC: 31 MG/DL (ref 7–30)
BUPROPION QUAL: NEGATIVE
CAFFEINE QUAL: POSITIVE
CALCIUM SERPL-MCNC: 8.4 MG/DL (ref 8.5–10.1)
CALCIUM SERPL-MCNC: 8.7 MG/DL (ref 8.5–10.1)
CANNABINOIDS UR QL SCN: NEGATIVE
CARBAMAZEPINE QUAL: NEGATIVE
CHLORIDE SERPL-SCNC: 95 MMOL/L (ref 94–109)
CHLORIDE SERPL-SCNC: 96 MMOL/L (ref 94–109)
CHLORPHENIRAMINE: NEGATIVE
CHLORPROMAZINE: NEGATIVE
CITALOPRAM QUAL: NEGATIVE
CLOMIPRAMINE QUAL: NEGATIVE
CMV IGG SERPL QL IA: >8 AI (ref 0–0.8)
CO2 SERPL-SCNC: 32 MMOL/L (ref 20–32)
CO2 SERPL-SCNC: 36 MMOL/L (ref 20–32)
COCAINE QUAL: NEGATIVE
COCAINE UR QL: NEGATIVE
CODEINE QUAL: NEGATIVE
CREAT SERPL-MCNC: 1.23 MG/DL (ref 0.66–1.25)
CREAT SERPL-MCNC: 1.35 MG/DL (ref 0.66–1.25)
DESIPRAMINE QUAL: NEGATIVE
DEXTROMETHORPHAN: NEGATIVE
DIFFERENTIAL METHOD BLD: ABNORMAL
DIPHENHYDRAMINE: NEGATIVE
DOXEPIN/METABOLITE: NEGATIVE
DOXYLAMINE: NEGATIVE
EBV EA-D IGG SER-ACNC: <0.2 AI (ref 0–0.8)
EOSINOPHIL # BLD AUTO: 0 10E9/L (ref 0–0.7)
EOSINOPHIL NFR BLD AUTO: 0 %
EPHEDRINE OR PSEUDO: NEGATIVE
ERYTHROCYTE [DISTWIDTH] IN BLOOD BY AUTOMATED COUNT: 18.1 % (ref 10–15)
ERYTHROCYTE [DISTWIDTH] IN BLOOD BY AUTOMATED COUNT: 18.2 % (ref 10–15)
FENTANYL QUAL: NEGATIVE
FLUOXETINE AND METAB: POSITIVE
GFR SERPL CREATININE-BSD FRML MDRD: 54 ML/MIN/{1.73_M2}
GFR SERPL CREATININE-BSD FRML MDRD: 61 ML/MIN/{1.73_M2}
GLUCOSE BLDC GLUCOMTR-MCNC: 135 MG/DL (ref 70–99)
GLUCOSE BLDC GLUCOMTR-MCNC: 139 MG/DL (ref 70–99)
GLUCOSE BLDC GLUCOMTR-MCNC: 158 MG/DL (ref 70–99)
GLUCOSE BLDC GLUCOMTR-MCNC: 170 MG/DL (ref 70–99)
GLUCOSE SERPL-MCNC: 136 MG/DL (ref 70–99)
GLUCOSE SERPL-MCNC: 168 MG/DL (ref 70–99)
HAV IGM SERPL QL IA: NONREACTIVE
HBV CORE AB SERPL QL IA: NONREACTIVE
HBV SURFACE AB SERPL IA-ACNC: 0.69 M[IU]/ML
HBV SURFACE AG SERPL QL IA: NONREACTIVE
HCO3 BLDV-SCNC: 36 MMOL/L (ref 21–28)
HCO3 BLDV-SCNC: 37 MMOL/L (ref 21–28)
HCT VFR BLD AUTO: 37.8 % (ref 40–53)
HCT VFR BLD AUTO: 38.4 % (ref 40–53)
HCV AB SERPL QL IA: NONREACTIVE
HGB BLD-MCNC: 12.4 G/DL (ref 13.3–17.7)
HGB BLD-MCNC: 12.5 G/DL (ref 13.3–17.7)
HGB BLD-MCNC: 14.1 G/DL (ref 13.3–17.7)
HGB FREE PLAS-MCNC: <30 MG/DL
HIV 1+2 AB+HIV1 P24 AG SERPL QL IA: NONREACTIVE
HYDROCODONE QUAL: NEGATIVE
HYDROMORPHONE QUAL: NEGATIVE
IBUPROFEN QUAL: NEGATIVE
IMIPRAMINE QUAL: NEGATIVE
IMM GRANULOCYTES # BLD: 0.1 10E9/L (ref 0–0.4)
IMM GRANULOCYTES NFR BLD: 0.8 %
KETAMINE QUAL: NEGATIVE
LAMOTRIGINE QUAL: NEGATIVE
LIDOCAIN SPEC QL: POSITIVE
LMWH PPP CHRO-ACNC: 0.36 IU/ML
LOXAPINE: NEGATIVE
LYMPHOCYTES # BLD AUTO: 0.3 10E9/L (ref 0.8–5.3)
LYMPHOCYTES NFR BLD AUTO: 4.8 %
MAGNESIUM SERPL-MCNC: 1.3 MG/DL (ref 1.6–2.3)
MAPROTYLINE: NEGATIVE
MCH RBC QN AUTO: 28.1 PG (ref 26.5–33)
MCH RBC QN AUTO: 28.4 PG (ref 26.5–33)
MCHC RBC AUTO-ENTMCNC: 32.6 G/DL (ref 31.5–36.5)
MCHC RBC AUTO-ENTMCNC: 32.8 G/DL (ref 31.5–36.5)
MCV RBC AUTO: 86 FL (ref 78–100)
MCV RBC AUTO: 87 FL (ref 78–100)
MDMA QUAL: NEGATIVE
MEPERIDINE QUAL: NEGATIVE
METHAMPHETAMINE: NEGATIVE
METHODONE QUAL: NEGATIVE
MIRTAZAPINE QUAL: NEGATIVE
MISCELLANEOUS TEST: NORMAL
MISCELLANEOUS TEST: NORMAL
MONOCYTES # BLD AUTO: 0.4 10E9/L (ref 0–1.3)
MONOCYTES NFR BLD AUTO: 5.8 %
MORPHINE QUAL: NEGATIVE
NEUTROPHILS # BLD AUTO: 5.3 10E9/L (ref 1.6–8.3)
NEUTROPHILS NFR BLD AUTO: 88.3 %
NICOTINE: NEGATIVE
NORTRIPTYLINE QUAL: NEGATIVE
NRBC # BLD AUTO: 0 10*3/UL
NRBC BLD AUTO-RTO: 0 /100
O2/TOTAL GAS SETTING VFR VENT: 21 %
O2/TOTAL GAS SETTING VFR VENT: 21 %
O2/TOTAL GAS SETTING VFR VENT: ABNORMAL %
O2/TOTAL GAS SETTING VFR VENT: ABNORMAL %
OLANZAPINE QUAL: NEGATIVE
OPIATES UR QL SCN: NEGATIVE
OXYCODONE QUAL: NEGATIVE
OXYHGB MFR BLDV: 45 %
OXYHGB MFR BLDV: 61 %
OXYHGB MFR BLDV: 69 %
OXYHGB MFR BLDV: 71 %
PCO2 BLDV: 45 MM HG (ref 40–50)
PCO2 BLDV: 48 MM HG (ref 40–50)
PENTAZOCINE: NEGATIVE
PF4 HEPARIN CMPLX AB SER QL: NEGATIVE
PH BLDV: 7.5 PH (ref 7.32–7.43)
PH BLDV: 7.51 PH (ref 7.32–7.43)
PHENCYCLIDINE QUAL: NEGATIVE
PHENTERMINE: NEGATIVE
PHOSPHATE SERPL-MCNC: 3.1 MG/DL (ref 2.5–4.5)
PLATELET # BLD AUTO: 100 10E9/L (ref 150–450)
PLATELET # BLD AUTO: 120 10E9/L (ref 150–450)
PO2 BLDV: 25 MM HG (ref 25–47)
PO2 BLDV: 32 MM HG (ref 25–47)
PO2 BLDV: 37 MM HG (ref 25–47)
PO2 BLDV: 38 MM HG (ref 25–47)
POTASSIUM SERPL-SCNC: 3.5 MMOL/L (ref 3.4–5.3)
POTASSIUM SERPL-SCNC: 3.6 MMOL/L (ref 3.4–5.3)
PROPOFOL QUAL: NEGATIVE
PROPOXPHENE QUAL: NEGATIVE
PROPRANOLOL QUAL: NEGATIVE
PYRILAMINE: NEGATIVE
QUETIAPINE METAB QUAL: NEGATIVE
RBC # BLD AUTO: 4.4 10E12/L (ref 4.4–5.9)
RBC # BLD AUTO: 4.41 10E12/L (ref 4.4–5.9)
SALICYLATE QUAL: NEGATIVE
SERTRALINE QUAL: NEGATIVE
SODIUM SERPL-SCNC: 135 MMOL/L (ref 133–144)
SODIUM SERPL-SCNC: 136 MMOL/L (ref 133–144)
T GONDII IGG SER QL: <3 IU/ML (ref 0–7.1)
T PALLIDUM AB SER QL: NONREACTIVE
THEOBROMINE: POSITIVE
TOPIRAMATE QUAL: NEGATIVE
TRAMADOL QUAL: NEGATIVE
TRIMIPRAMINE QUAL: NEGATIVE
VENLAFAXINE QUAL: NEGATIVE
VZV IGG SER QL IA: 2 AI (ref 0–0.8)
WBC # BLD AUTO: 4.8 10E9/L (ref 4–11)
WBC # BLD AUTO: 6 10E9/L (ref 4–11)

## 2020-02-12 PROCEDURE — 25000128 H RX IP 250 OP 636: Performed by: STUDENT IN AN ORGANIZED HEALTH CARE EDUCATION/TRAINING PROGRAM

## 2020-02-12 PROCEDURE — 00000146 ZZHCL STATISTIC GLUCOSE BY METER IP

## 2020-02-12 PROCEDURE — 83735 ASSAY OF MAGNESIUM: CPT | Performed by: INTERNAL MEDICINE

## 2020-02-12 PROCEDURE — 86780 TREPONEMA PALLIDUM: CPT | Performed by: INTERNAL MEDICINE

## 2020-02-12 PROCEDURE — 20000004 ZZH R&B ICU UMMC

## 2020-02-12 PROCEDURE — 40000275 ZZH STATISTIC RCP TIME EA 10 MIN

## 2020-02-12 PROCEDURE — 25000132 ZZH RX MED GY IP 250 OP 250 PS 637: Mod: GY | Performed by: STUDENT IN AN ORGANIZED HEALTH CARE EDUCATION/TRAINING PROGRAM

## 2020-02-12 PROCEDURE — 86644 CMV ANTIBODY: CPT | Performed by: INTERNAL MEDICINE

## 2020-02-12 PROCEDURE — 25000132 ZZH RX MED GY IP 250 OP 250 PS 637: Mod: GY | Performed by: INTERNAL MEDICINE

## 2020-02-12 PROCEDURE — G0499 HEPB SCREEN HIGH RISK INDIV: HCPCS | Performed by: INTERNAL MEDICINE

## 2020-02-12 PROCEDURE — 25000125 ZZHC RX 250: Performed by: STUDENT IN AN ORGANIZED HEALTH CARE EDUCATION/TRAINING PROGRAM

## 2020-02-12 PROCEDURE — 25000132 ZZH RX MED GY IP 250 OP 250 PS 637: Mod: GY

## 2020-02-12 PROCEDURE — 85730 THROMBOPLASTIN TIME PARTIAL: CPT | Performed by: INTERNAL MEDICINE

## 2020-02-12 PROCEDURE — 25800030 ZZH RX IP 258 OP 636: Performed by: STUDENT IN AN ORGANIZED HEALTH CARE EDUCATION/TRAINING PROGRAM

## 2020-02-12 PROCEDURE — 85025 COMPLETE CBC W/AUTO DIFF WBC: CPT | Performed by: INTERNAL MEDICINE

## 2020-02-12 PROCEDURE — 84153 ASSAY OF PSA TOTAL: CPT | Performed by: INTERNAL MEDICINE

## 2020-02-12 PROCEDURE — 86663 EPSTEIN-BARR ANTIBODY: CPT | Performed by: INTERNAL MEDICINE

## 2020-02-12 PROCEDURE — 86706 HEP B SURFACE ANTIBODY: CPT | Performed by: INTERNAL MEDICINE

## 2020-02-12 PROCEDURE — 25800030 ZZH RX IP 258 OP 636: Performed by: INTERNAL MEDICINE

## 2020-02-12 PROCEDURE — 80321 ALCOHOLS BIOMARKERS 1OR 2: CPT | Performed by: STUDENT IN AN ORGANIZED HEALTH CARE EDUCATION/TRAINING PROGRAM

## 2020-02-12 PROCEDURE — 85520 HEPARIN ASSAY: CPT | Performed by: STUDENT IN AN ORGANIZED HEALTH CARE EDUCATION/TRAINING PROGRAM

## 2020-02-12 PROCEDURE — 84100 ASSAY OF PHOSPHORUS: CPT | Performed by: INTERNAL MEDICINE

## 2020-02-12 PROCEDURE — 86787 VARICELLA-ZOSTER ANTIBODY: CPT | Performed by: INTERNAL MEDICINE

## 2020-02-12 PROCEDURE — 86481 TB AG RESPONSE T-CELL SUSP: CPT | Performed by: STUDENT IN AN ORGANIZED HEALTH CARE EDUCATION/TRAINING PROGRAM

## 2020-02-12 PROCEDURE — 99291 CRITICAL CARE FIRST HOUR: CPT | Mod: GC | Performed by: INTERNAL MEDICINE

## 2020-02-12 PROCEDURE — 40000048 ZZH STATISTIC DAILY SWAN MONITORING

## 2020-02-12 PROCEDURE — 86777 TOXOPLASMA ANTIBODY: CPT | Performed by: INTERNAL MEDICINE

## 2020-02-12 PROCEDURE — 40000196 ZZH STATISTIC RAPCV CVP MONITORING

## 2020-02-12 PROCEDURE — 85027 COMPLETE CBC AUTOMATED: CPT | Performed by: INTERNAL MEDICINE

## 2020-02-12 PROCEDURE — G0472 HEP C SCREEN HIGH RISK/OTHER: HCPCS | Performed by: INTERNAL MEDICINE

## 2020-02-12 PROCEDURE — 40000076 ZZH STATISTIC IABP MONITORING

## 2020-02-12 PROCEDURE — 84154 ASSAY OF PSA FREE: CPT | Performed by: INTERNAL MEDICINE

## 2020-02-12 PROCEDURE — 87389 HIV-1 AG W/HIV-1&-2 AB AG IA: CPT | Performed by: INTERNAL MEDICINE

## 2020-02-12 PROCEDURE — 80323 ALKALOIDS NOS: CPT | Performed by: INTERNAL MEDICINE

## 2020-02-12 PROCEDURE — 86905 BLOOD TYPING RBC ANTIGENS: CPT | Performed by: STUDENT IN AN ORGANIZED HEALTH CARE EDUCATION/TRAINING PROGRAM

## 2020-02-12 PROCEDURE — 80048 BASIC METABOLIC PNL TOTAL CA: CPT | Performed by: INTERNAL MEDICINE

## 2020-02-12 PROCEDURE — 25000128 H RX IP 250 OP 636

## 2020-02-12 PROCEDURE — 83051 HEMOGLOBIN PLASMA: CPT | Performed by: INTERNAL MEDICINE

## 2020-02-12 PROCEDURE — 40000014 ZZH STATISTIC ARTERIAL MONITORING DAILY

## 2020-02-12 PROCEDURE — 86709 HEPATITIS A IGM ANTIBODY: CPT | Performed by: INTERNAL MEDICINE

## 2020-02-12 PROCEDURE — 71045 X-RAY EXAM CHEST 1 VIEW: CPT

## 2020-02-12 PROCEDURE — 86704 HEP B CORE ANTIBODY TOTAL: CPT | Performed by: INTERNAL MEDICINE

## 2020-02-12 PROCEDURE — 25000125 ZZHC RX 250: Performed by: INTERNAL MEDICINE

## 2020-02-12 PROCEDURE — 82805 BLOOD GASES W/O2 SATURATION: CPT | Performed by: STUDENT IN AN ORGANIZED HEALTH CARE EDUCATION/TRAINING PROGRAM

## 2020-02-12 RX ORDER — CAPTOPRIL 25 MG/1
25 TABLET ORAL EVERY 8 HOURS SCHEDULED
Status: DISCONTINUED | OUTPATIENT
Start: 2020-02-12 | End: 2020-02-13

## 2020-02-12 RX ORDER — CAPTOPRIL 12.5 MG/1
12.5 TABLET ORAL EVERY 8 HOURS SCHEDULED
Status: DISCONTINUED | OUTPATIENT
Start: 2020-02-12 | End: 2020-02-12

## 2020-02-12 RX ORDER — BUMETANIDE 0.25 MG/ML
4 INJECTION INTRAMUSCULAR; INTRAVENOUS ONCE
Status: COMPLETED | OUTPATIENT
Start: 2020-02-12 | End: 2020-02-12

## 2020-02-12 RX ORDER — HEPARIN SODIUM 10000 [USP'U]/100ML
0-3500 INJECTION, SOLUTION INTRAVENOUS CONTINUOUS
Status: DISCONTINUED | OUTPATIENT
Start: 2020-02-12 | End: 2020-02-14

## 2020-02-12 RX ORDER — BUMETANIDE 0.25 MG/ML
4 INJECTION INTRAMUSCULAR; INTRAVENOUS EVERY 12 HOURS
Status: DISCONTINUED | OUTPATIENT
Start: 2020-02-12 | End: 2020-02-12

## 2020-02-12 RX ORDER — LANOLIN ALCOHOL/MO/W.PET/CERES
3 CREAM (GRAM) TOPICAL
Status: DISCONTINUED | OUTPATIENT
Start: 2020-02-12 | End: 2020-02-22

## 2020-02-12 RX ORDER — BUMETANIDE 0.25 MG/ML
4 INJECTION INTRAMUSCULAR; INTRAVENOUS DAILY
Status: DISCONTINUED | OUTPATIENT
Start: 2020-02-13 | End: 2020-02-13

## 2020-02-12 RX ADMIN — BUMETANIDE 4 MG: 0.25 INJECTION INTRAMUSCULAR; INTRAVENOUS at 01:03

## 2020-02-12 RX ADMIN — SODIUM THIOSULFATE 3 MCG/KG/MIN: 250 INJECTION, SOLUTION INTRAVENOUS at 04:50

## 2020-02-12 RX ADMIN — SODIUM THIOSULFATE 3 MCG/KG/MIN: 250 INJECTION, SOLUTION INTRAVENOUS at 07:18

## 2020-02-12 RX ADMIN — MELATONIN TAB 3 MG 3 MG: 3 TAB at 21:38

## 2020-02-12 RX ADMIN — FLUOXETINE 20 MG: 20 CAPSULE ORAL at 08:26

## 2020-02-12 RX ADMIN — INSULIN GLARGINE 10 UNITS: 100 INJECTION, SOLUTION SUBCUTANEOUS at 21:25

## 2020-02-12 RX ADMIN — ATORVASTATIN CALCIUM 20 MG: 20 TABLET, FILM COATED ORAL at 21:22

## 2020-02-12 RX ADMIN — DICLOFENAC 4 G: 10 GEL TOPICAL at 15:42

## 2020-02-12 RX ADMIN — MAGNESIUM OXIDE 400 MG: 400 TABLET ORAL at 08:30

## 2020-02-12 RX ADMIN — DICLOFENAC 4 G: 10 GEL TOPICAL at 12:06

## 2020-02-12 RX ADMIN — SODIUM THIOSULFATE 3 MCG/KG/MIN: 250 INJECTION, SOLUTION INTRAVENOUS at 10:00

## 2020-02-12 RX ADMIN — MELATONIN TAB 3 MG 3 MG: 3 TAB at 01:02

## 2020-02-12 RX ADMIN — CAPTOPRIL 25 MG: 25 TABLET ORAL at 21:24

## 2020-02-12 RX ADMIN — ASPIRIN 81 MG CHEWABLE TABLET 81 MG: 81 TABLET CHEWABLE at 08:29

## 2020-02-12 RX ADMIN — POTASSIUM CHLORIDE 20 MEQ: 750 TABLET, EXTENDED RELEASE ORAL at 17:38

## 2020-02-12 RX ADMIN — CAPTOPRIL 12.5 MG: 12.5 TABLET ORAL at 14:26

## 2020-02-12 RX ADMIN — BUMETANIDE 4 MG: 0.25 INJECTION INTRAMUSCULAR; INTRAVENOUS at 05:52

## 2020-02-12 RX ADMIN — THIAMINE HCL TAB 100 MG 100 MG: 100 TAB at 08:31

## 2020-02-12 RX ADMIN — ALLOPURINOL 200 MG: 100 TABLET ORAL at 08:26

## 2020-02-12 RX ADMIN — ARGATROBAN 0.5 MCG/KG/MIN: 1 INJECTION INTRAVENOUS at 06:00

## 2020-02-12 RX ADMIN — DOBUTAMINE 6 MCG/KG/MIN: 12.5 INJECTION, SOLUTION INTRAVENOUS at 15:38

## 2020-02-12 RX ADMIN — OMEPRAZOLE 20 MG: 20 CAPSULE, DELAYED RELEASE ORAL at 08:30

## 2020-02-12 RX ADMIN — Medication 6.25 MG: at 08:25

## 2020-02-12 RX ADMIN — SODIUM THIOSULFATE 2 MCG/KG/MIN: 250 INJECTION, SOLUTION INTRAVENOUS at 14:26

## 2020-02-12 RX ADMIN — MAGNESIUM SULFATE IN WATER 4 G: 40 INJECTION, SOLUTION INTRAVENOUS at 06:33

## 2020-02-12 RX ADMIN — AMIODARONE HYDROCHLORIDE 400 MG: 200 TABLET ORAL at 21:21

## 2020-02-12 RX ADMIN — SODIUM THIOSULFATE 0.5 MCG/KG/MIN: 250 INJECTION, SOLUTION INTRAVENOUS at 02:13

## 2020-02-12 RX ADMIN — Medication 200 MG: at 08:33

## 2020-02-12 RX ADMIN — DICLOFENAC 4 G: 10 GEL TOPICAL at 08:41

## 2020-02-12 RX ADMIN — POTASSIUM CHLORIDE 20 MEQ: 750 TABLET, EXTENDED RELEASE ORAL at 06:34

## 2020-02-12 RX ADMIN — HEPARIN SODIUM 1200 UNITS/HR: 10000 INJECTION, SOLUTION INTRAVENOUS at 16:08

## 2020-02-12 RX ADMIN — AMIODARONE HYDROCHLORIDE 400 MG: 200 TABLET ORAL at 08:29

## 2020-02-12 ASSESSMENT — ACTIVITIES OF DAILY LIVING (ADL)
ADLS_ACUITY_SCORE: 14

## 2020-02-12 ASSESSMENT — MIFFLIN-ST. JEOR: SCORE: 1743.63

## 2020-02-12 NOTE — PLAN OF CARE
Summary: Patient is alert and oriented x 4. PERRL. Follows commands. Moves all extremities. Denies pain. NSR to sinus tachycardia. MAP goal 70-75. HR in the 90s-120s. Patient became more tachycardiac, increased PA pressures, low SvO2, and lower cardiac index. Cards fellow notified. Nipride initiated and balloon pump switched from 1:3 to 1:1. Afebrile. +pulses. Dobutamine, argatroban, dopamine, nipride gtts. Lung sounds diminished. CPAP 3 L overnight. K+, Mg++ replaced. No BM overnight. Guevara catheter in place with adequate urine output. Bumex 4 mg x 2.     Safety concerns: Call light within reach    Plan of care: Transplant work-up vs LVAD     Jonny Draper RN gave verbal report to KRISTEN Roldan regarding the care of Eliseo Tanner

## 2020-02-12 NOTE — PROGRESS NOTES
Transplant Teaching    Writer met with patient and spoke to wife over the phone,  to complete heart transplant teaching. We reviewed the candidate selection process, insurance prior authorization, UNOS priority statuses, the waiting period, donor issues (inclduing PHS risk), and the pre-, keyla-, and post-op periods. In addition, we discussed some of the side effects of prednisone including elevated blood sugars, muscle weakness, sun sensitivity, and mood changes. We also reviewed the major risks of transplantation including rejection, infection and transplant vasculopathy. We also discussed patient and caregiver responsibilities before and after transplant including the need for patient to identify a caregiver to be present for education and to assist patient post discharge. Throughout the 90-minute session, the patient was attentive, eager to learn, and asked appropriate questions.  Patient  was given a copy of the UNOS brochure on Multiple Waitlisting, the  Heart Transplant brochure including current program statistics, and a copy of the transplant handbook for review.  Gave patient the transplant office toll-free number and encouraged to call with future questions or concerns.    In follow up, the patient was instructed on completing the following items for his/her transplant evaluation:    PSA pending  Dental consult (inpatient staff to call-dental CT negative)  Palliative care  Colonoscopy-done in 2012, records scanned and was negative (repeat in 10 years, due in 2022)

## 2020-02-12 NOTE — CONSULTS
NAME: Eliseo Tanner   MRN: [id number]  : 1955  MIRANDA: 2020  [place] [address]  [phone]    NEUROPSYCHOLOGICAL EVALUATION    RELEVANT HISTORY AND REASON FOR REFERRAL    This is a report of neuropsychological consultation regarding Bon Tanner, a 64-year-old, right-handed man with 12 years of formal education. He is currently receiving inpatient treatment for acute on chronic systolic congestive heart failure, in the setting of nonischemic cardiomyopathy, coronary artery disease, type-two diabetes, stage-three chronic kidney disease, obstructive sleep apnea, and hyperlipidemia. He is under consideration for treatment of heart failure with LVAD. He is referred for the requisite neuropsychological evaluation in that context, to better understand cognitive and psychosocial factors that affect LVAD candidacy. The referral order was placed by Dr. Jemal Cantu.    In interview, Mr. Tanner he demonstrates a good understanding of his cardiac health history and the reasons for considering advanced heart therapies. Treatment goals include generally just feeling better, but he would like to be more active, be able to engage in his usual hobbies again, to ride motorcycles, and maybe find a way to return to work in some capacity. He understands that the procedure carries risks that include stroke, clots, and death.    His core support group includes his wife, three children, and his wife s family. The children are all living in [place], but his wife s family all live in the same town that he does. He is aware of the 30-day caregiver requirement, and he says there is a plan for his wife to be the primary caregiver with a sister-in-law helping out.    He denies current mental health concerns, and he says he has never been treated with any kind of psychiatric medications. However, outside records from the [place] system list anxiety as a current problem, and he is  currently prescribed fluoxetine. He reports having gone through some psychotherapy sessions after a divorce around 1992 or 1993. He also describes a suicide attempt around age 38, back when he had a significant drinking problem. He spontaneously aborted an attempt to take his life through carbon monoxide poisoning. This event led to him quitting drinking.    He says he was able to achieve and sustain abstinence from alcohol without a chemical dependency treatment program. He says he has had essentially no alcohol since he was 38 years old. He also quit smoking tobacco around that time. He denies any history of illicit drug use.    He denies any legal problems, gambling problems, or any addictive/compulsive style behaviors.    He reports some occasional issues with insomnia, mostly due to breathing problems. He has tried melatonin, and he also says that a new VPAP system for treating sleep apnea has been helpful.    He denies any concerns about his cognitive functioning. He currently lives with his wife and their 14 year-old granddaughter, who they actually adopted when she was 15 months old. He and his wife share household financial responsibilities, and he denies any concerns about cognitive interference. He says he manages his medications on his own without any difficulties. There are no cognitively-based reasons to curtail his driving habits. Cognitive history is negative for early life delays or special educational needs. He graduated from high school on time. 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    He reports some recent feelings of shakiness that come and go, but there are no indications of parkinsonism. He has a history of migraines, coming on about twice per year. He manages them with Excedrin Migraine, peppermint oil, and napping. He denies any history of stroke, seizure, TBI, or major changes to his senses of smell, taste,  hearing, and vision. Head CT obtained during this admission showed mild basal ganglia mineralization but was otherwise were unremarkable.    BEHAVIORAL OBSERVATIONS    Mr. Tanner was polite and cooperative with the evaluation. He was alert and not somnolent. Immediate attentional control was appropriate. Speech production showed no aphasic qualities. Language comprehension was normal. Insight was generally normal. He had a neutral to slightly dysphoric demeanor. He had to remain completely flat in his hospital bed due to the presence of an intra-aortic balloon pump, so tests relying on writing, drawing, and fine-motor manipulation had to be deferred. His effort and persistence were good. The test results are seen as valid estimations of his cognitive functioning.    MEASURES ADMINISTERED    The following measures were administered by a trained psychometrist, under my direct supervision:    Orientation: Time, Place, Basic Personal Information, Recent [place] Presidents; Wide Range Achievement Test 4: Word Reading; Wechsler Abbreviated Scales of Intelligence-2: Vocabulary, Matrix Reasoning; Controlled Oral Word Association Test; Unity Naming Test; [place] Visual Acuity Screen; Test of Sustained Attention & Tracking; Rony Auditory Verbal Learning Test; Wechsler Memory Scale-IV: Logical Memory; Geriatric Depression Scale.    RESULTS AND INTERPRETATION    Orientation was within normal expectations for time, place, basic personal information, and basic cultural information. Performance on a reading and pronunciation test that is validated for estimating premorbid intelligence was in the low-to-middle average ranges. On measures of current intellectual functioning, demonstrating vocabulary knowledge was low average and visual reasoning through pattern identification was low average. Confrontation naming was average. Associative verbal fluency was in the borderline impaired range. Executive management over attention and working  memory were low average across a variety of brief exercises. Immediate verbal memory for short stories was average. Delayed story recall was average, and recognition of story details was lower average. Learning a word list over repeated readings was in the average range. Free recall of the list was high average after a brief delay with active interference and remained high average after 30 minutes. Delayed recognition of the list was normal in terms of true hits but abnormal in terms of false-positive errors.    On a brief self-report inventory, he endorsed a minimal degree of symptoms related to depression and anxiety (GDS = 4/30).    IMPRESSIONS AND RECOMMENDATIONS    The neuropsychological results are mildly abnormal. Mr. Tanner he demonstrates some subtle weaknesses in executive functioning, mental speed, and mental efficiency. The findings suggest slight weaknesses among frontal-subfrontal cerebral systems, but his performances do not suggest a serious cognitive impairment. The findings are most likely explained by acute illness and other situational factors, on top of some slight downstream effects of his cardiac condition. I do not have concerns about an underlying neurodegenerative disease. He demonstrates appropriate levels of insight into his cardiac condition and treatment considerations. I do not have concerns about cognitive problems that would preclude independent medical management and decision-making.    He reports a remote history of alcohol problems, with remission for more than 25 years. There is no history of illicit drug use. Mr. Tanner may be downplaying his more recent history of mental health concerns in today s interview (e.g., he denies concerns or treatments but medical records list a diagnosis of anxiety and a prescription for fluoxetine), but he is quite open and forthright about the fact that he attempted suicide about 26 years ago. He says it was this event that spurred him to change his  life and quit drinking. I would recommend that a plan be set in place for close monitoring of his mood and adjustment, if he goes forward with LVAD. I would encourage having a low threshold for increasing or initiating clinical attention if any additional symptoms seem to arise.    He indicates having a good support system in place at home. I defer to the team s social workers in their determination of the adequacy of his post-LVAD caregiver plan.    Overall, there are no immediate contraindications to continued candidacy for LVAD, from a neuropsychological perspective.    Kun Urbano, PhD, LP, ABPP-CN  Board Certified in Clinical Neuropsychology  Licensed Psychologist [id number]     Department of Rehabilitation Medicine  Division of Adult Neuropsychology  [facility]      Time spent: One hour neurobehavioral status exam including interview, clinical assessment by licensed and board-certified neuropsychologist (CPT 65146). One hour neuropsychological testing evaluation by licensed and board-certified neuropsychologist, including integration of patient data, interpretation of standardized test results and clinical data, clinical decision-making, treatment planning, report, and interactive feedback to the patient, first hour (CPT 99231). One hour of neuropsychological testing evaluation by licensed and board-certified neuropsychologist, including integration of patient data, interpretation of standardized test results and clinical data, clinical decision-making, treatment planning, report, and interactive feedback to the patient, subsequent hours (CPT 51188). 30 minutes of psychological and neuropsychological test administration and scoring by technician, first 30 minutes (CPT 72926). 50 minutes psychological or neuropsychological test administration and scoring by technician, subsequent 30-minute intervals (CPT 73127). Diagnoses: I50.22, F54

## 2020-02-12 NOTE — CONSULTS
[facility] Cardiovascular Surgery Consult     Eliseo Tanner MRN# [id number]   YOB: 1955 Age: 64 year old      Date of Admission:  2/6/2020    Primary care provider: Jay Steele    Date of Service: February 11, 2020    Reason for consult: Eliseo Tanner is a 64 year old  male with end stage heart failure           Assessment and Plan:   Eliseo Tanner is a 64 year old male with NICM, chronic systolic heart failure, HFrEF with EF of 15-20%, CAD, HTN, ABHINAV, DM2 and CKDIII transferred to [facility] for advanced heart failure therapy from [place], [place]. He critically ill requiring PA catheter monitoring, intra-aortic balloon pump support and 2 intravenous inotropic medications. He is undergoing work up for both heart transplant and LVAD.     1) Agree with ongoing work up transplant/LVAD while critically ill. Second inotrope weaned off today and Cr continues to decline.  2) May need to consider alternative temporary support during workup to increase mobility and rehabilitation    William Gan MD  Cardiothoracic Surgery  [phone]             Chief Complaint:   Eliseo Tanner is a 64 year old male who complains of end stage heart failure         History of Present Illness:   Mr. Eliseo Tanner is a 64 year old male who presents from [place], [place] with known HFrEF, NICM, CKDIII, HTN, ABHINAV, DMII who was recently admitted there with HF exacerbation in cardiogenic shock and cardiorenal syndrome and worsening LFTs. He was eventually discharged on home milrinone but failed this therapy. He also had 3 episodes of Vfib requiring defibrillation x 3.     He arrived 2/6 and by 2/7 had worsening renal function and remained in cardiogenic shock. A second inotrope was added and a femoral IABP was inserted. He has since had some renal recovery and been weaned from a dopamine. He remains on dobutamine with the IABP at 1:1. His PVR is 1.7 and PCWP is 22 with a CVP of 10 and PA pressures of 44/24  today.                   Past Medical History:   CAD  HFrEF  ABHINAV  DM2  CKDIII  HTN  NICM          Past Surgical History:   Carpal tunnel release  Inguinal hernia repair  Right TKA  Left TKA  Laminectomy L5  Rotator cuff repair    Past Surgical History:   Procedure Laterality Date     CV INTRA-AORTIC BALLOON PUMP INSERTION N/A 2/7/2020    Procedure: Intra-Aortic Balloon Pump Insertion;  Surgeon: Jose Baldwin MD;  Location:  [facility]ATH LAB                 Social History:     Social History     Socioeconomic History     Marital status:      Spouse name: Not on file     Number of children: Not on file     Years of education: Not on file     Highest education level: Not on file   Occupational History     Not on file   Social Needs     Financial resource strain: Not on file     Food insecurity:     Worry: Not on file     Inability: Not on file     Transportation needs:     Medical: Not on file     Non-medical: Not on file   Tobacco Use     Smoking status: Not on file   Substance and Sexual Activity     Alcohol use: Not on file     Drug use: Not on file     Sexual activity: Not on file   Lifestyle     Physical activity:     Days per week: Not on file     Minutes per session: Not on file     Stress: Not on file   Relationships     Social connections:     Talks on phone: Not on file     Gets together: Not on file     Attends Religion service: Not on file     Active member of club or organization: Not on file     Attends meetings of clubs or organizations: Not on file     Relationship status: Not on file     Intimate partner violence:     Fear of current or ex partner: Not on file     Emotionally abused: Not on file     Physically abused: Not on file     Forced sexual activity: Not on file   Other Topics Concern     Not on file   Social History Narrative     Not on file              Family History:     Arthritis Mother     Hypertension Mother     Heart Mother     Thyroid Mother     Diabetes Mother      Not otherwise listed - Cancer Father   stomach     GI Father     Diabetes Paternal Grandmother     Migraines Daughter     Heart Maternal Uncle            Immunizations:     There is no immunization history on file for this patient.          Allergies:        Allergies   Allergen Reactions     Oxycodone Itching and Other (See Comments)             Medications:     Current Facility-Administered Medications   Medication     acetaminophen (TYLENOL) tablet 650 mg     albuterol (PROAIR HFA/PROVENTIL HFA/VENTOLIN HFA) 108 (90 Base) MCG/ACT inhaler 2 puff     allopurinol (ZYLOPRIM) tablet 200 mg     amiodarone (PACERONE/CODARONE) tablet 400 mg     argatroban (ACOVA) 1 mg/mL ANTICOAGULANT infusion     aspirin (ASA) chewable tablet 81 mg     atorvastatin (LIPITOR) tablet 20 mg     bumetanide (BUMEX) injection 4 mg     calcium carbonate (TUMS) chewable tablet 500 mg     co-enzyme Q-10 capsule 200 mg     glucose gel 15-30 g    Or     dextrose 50 % injection 25-50 mL    Or     glucagon injection 1 mg     diclofenac (VOLTAREN) 1 % topical gel 4 g     diphenhydrAMINE (BENADRYL) injection 50 mg     DOBUTamine (DOBUTREX) 1,000 mg in D5W 250 mL infusion (adult max conc)     DOPamine 400 mg in dextrose 5% 250 mL (adult std) - premix     EPINEPHrine (ADRENALIN) kit 0.3 mg     fentaNYL (PF) (SUBLIMAZE) injection 25 mcg     FLUoxetine (PROzac) capsule 20 mg     insulin aspart (NovoLOG) inj (RAPID ACTING)     insulin aspart (NovoLOG) inj (RAPID ACTING)     insulin glargine (LANTUS PEN) injection 10 Units     lidocaine (LMX4) cream     lidocaine 1 % 0.1-1 mL     magnesium oxide (MAG-OX) tablet 400 mg     magnesium sulfate 2 g in water intermittent infusion     magnesium sulfate 4 g in 100 mL sterile water (premade)     medication instruction     methylPREDNISolone sodium succinate (solu-MEDROL) injection 125 mg     naloxone (NARCAN) injection 0.1-0.4 mg     omeprazole (priLOSEC) CR capsule 20 mg     potassium chloride (KLOR-CON) Packet  20-40 mEq     potassium chloride 10 mEq in 100 mL intermittent infusion with 10 mg lidocaine     potassium chloride 10 mEq in 100 mL sterile water intermittent infusion (premix)     potassium chloride 20 mEq in 50 mL intermittent infusion     potassium chloride ER (K-DUR/KLOR-CON M) CR tablet 20-40 mEq     potassium phosphate 10 mmol in D5W 250 mL intermittent infusion     potassium phosphate 15 mmol in D5W 250 mL intermittent infusion     potassium phosphate 20 mmol in D5W 250 mL intermittent infusion     potassium phosphate 20 mmol in D5W 500 mL intermittent infusion     potassium phosphate 25 mmol in D5W 500 mL intermittent infusion     ranitidine (ZANTAC) injection 50 mg     sodium chloride (PF) 0.9% PF flush 3 mL     sodium chloride (PF) 0.9% PF flush 3 mL     vitamin B1 (THIAMINE) tablet 100 mg             Review of Systems:     A 10 point ROS was performed and is negative other than HPI.             Physical Exam:        Temp:  [97.4  F (36.3  C)-98.3  F (36.8  C)] 98.3  F (36.8  C)  Heart Rate:  [] 106  Resp:  [18-20] 18  MAP:  [73 mmHg-107 mmHg] 90 mmHg  Arterial Line BP: ()/(49-78) 129/67  SpO2:  [91 %-99 %] 97 %    Gen: NAD, resting comfortably in bed  Lungs: CTAB, non-labored breathing, on oxygen via NC  CV: regular rhythm, normal rate  Abd: soft, NT, ND  Ext: femoral IABP intact with 1:1 augmentation.   Neuro: AOx3    Labs:  ABG   Recent Labs   Lab 02/06/20  2253   PH 7.39   PCO2 29*   PO2 76*   HCO3 18*     CBC  Recent Labs   Lab 02/11/20  0424 02/10/20  0256 02/09/20  0325 02/08/20  0932 02/08/20  0408   WBC 6.0 6.9  --  8.1 7.3   HGB 13.2* 13.5  --  13.0* 13.2*   * 161 179 229 238     BMP  Recent Labs   Lab 02/11/20  1447 02/11/20  0424 02/10/20  1552 02/10/20  0256    139 137 135   POTASSIUM 3.7 3.7 3.6 3.9   CHLORIDE 98 101 100 102   CO2 35* 32 32 28   BUN 34* 37* 36* 40*   CR 1.15 1.35* 1.34* 1.57*   * 121* 143* 157*     LFT  Recent Labs   Lab 02/08/20  0405  02/07/20  0511 02/06/20  2333   AST 78* 49* 29   ALT 74* 43 32   ALKPHOS 157* 156* 175*   BILITOTAL 0.7 0.9 1.2   ALBUMIN 3.1* 2.9* 3.2*   INR 1.33*  --  1.32*     PancreasNo lab results found in last 7 days.    Imaging:  Transthoracic echocardiogram (2/10/2020):  Interpretation Summary  Mild left ventricular dilation. The visually estimated LVEF is 10-15%.  Moderate right ventricular dilation with moderately reduced global right  ventricular function is moderately reduced.  Estimated pulmonary artery systolic pressure is 29 mmHg plus right atrial  pressure.  IVC diameter >2.1 cm collapsing <50% with sniff suggests a high RA pressure  estimated at 15 mmHg or greater.     This study was compared with the study from 2/7/20. Minimal change in  biventricular function    Coronary angiogram (11/4/2019):  Operative Date: 11/4/2019  Postoperative Diagnosis: Mild to moderate CAD with severe branch disease  Elevated LV EDP  Surgeon: Kay Browne MD  Findings:  LV FINDINGS:  LV Pressure Only, No Angiogram, LVEF not assessed.  DOMINANCE:  Right  LMCA:  20 % Lesion Distal - Left Main  LAD:  20 % Lesion Proximal - LAD  RAMUS:  Patent - Ramus  DIAGONAL 1:  60-70 % Lesion Proximal - 1st Diagonal  CIRCUMFLEX:  30 % Lesion Proximal - Circumflex  OM 1:  Patent - 1st OM  OM 2:  Patent - 2nd OM  RCA:  40 % Lesion Proximal - RCA    Right heart cath (1/15/2020):  AO    82    PV    16:15:05  PA    37    PA    16:23:26  Type    SV    CO (l/m)    CI (l/m/    HR    Time    AIR REST  Thermal    26.50    2.84    1.28    107    16:10:05  Jane    31.30    3.35    1.51    107    16:10:05  PRESSURES:  Time    AIR REST  RA  28/28  (26)  SV    16:10:05  RV  69/19,  31      16:10:33  PA  66/40  (51)  PA    16:15:38  PW  39/41  (42)  PV    16:16:03  PW  32/38  (38)      16:16:07    CT Chest (2/28/2020):  1. Moderate right and small left pleural effusions, with overlying  atelectasis/consolidation.  2. Mild tree-in-bud nodularity within the lateral  right upper lobe,  concerning for infection. Findings may represent respiratory  bronchiolitis.  3. No acute pathology within the abdomen or pelvis. Mild  intra-abdominal ascites and other signs of third spacing of fluid.  4. The inferior aspect of the intra-aortic balloon pump extends below  the level of the renal artery origins    Pulmonary function testing (9/30/2019):  FEV1 2.36, 78%  DLCO 79%    V/Q Perfusion scan 10/24/2019:  DISCUSSION:  The comparison chest radiograph demonstrates grossly clear lungs and pleural spaces.  There is normal bilateral ventilation.  There is normal bilateral pulmonary perfusion.

## 2020-02-12 NOTE — PROGRESS NOTES
Name: Eliseo Tanner MRN: [id number]  : 1955 MIRANDA: 2020  Staff: TAM Tech: RICARDO Age: 64  Sex: Male Hand: Right Educ: 12  Vision: 20/40 ?with correction / ?without correction    ORIENTATION     Time  -1     Place       Personal info          Presidents     WRAT4   SS %ile Grade Equiv.     Word Reading  92 30 10.8    WASI-2 FSIQ: 82  Raw T     Vocabulary  27 38     Matrix Reasoning 12 41    COWAT (CFL)     Raw: 21  SS: 6 %ile: 6-10    [place] NAMING TEST     Raw: 54  SS: 9 %ile: 29-40    TSAT     Time: 135  z: 1.13     Errors: 5 z: -1.08    AVLT (>55, MOANS norms)     Trial 1 2 3 4 5 B 6 30             6 7 8 10 10 5 10 10      Raw SS %ile     Trial 1    6 11 64-76     Learning Over Trials  11 8 12-28     Short Delay Recall  10 11 88     30  Recall   10 12 88-92     30  Recognition Hits/FPs  15/8 6 6-10    WMS-IV  Raw SS / %ile     LM I  25 10     LM II  21 10     LM Recog. 22 17th-25th    GDS (30-item)     Raw:  4 Interpretation: Minimal

## 2020-02-12 NOTE — PROGRESS NOTES
Cardiology Progress Note  Eliseo Tanner MRN: [id number]  Age: 64 year old, : 1955  Date: 2020      Critical Care ICU Note - Cardiology  Gucci Tomas M.D.   failure  Acute renal failure    The patient remains unstable in the ICU with on-going need fort, parenteral medications for the adjustment of blood pressure and cardiac output and maintenance of renal function.      The patient is seen for ; low cardiac output necessitating  inotropic agents, vasopressors and afterload reducing agents; limited mechanical support requiring IABP; acute renal failure requiring fluid and diuretic management; sepsis requiring antibiotic selection and monitoring, vasopressors, fluid management to maintain blood pressure and secondary organ function    I personally reviewed:    Arterial and venous blood gases to assess acid base balance, oxygenation, and ventilator settings.      Hemodynamic parameters obtained by central hemodynamic monitoring including RAP, estimated LVEDP, pulmonary artery pressure, cardiac output and vascular resistances in order to adjust fluids and infused medications for blood pressure and cardiac output maintenance.    45 minutes critical care    Volume status, renal function and nutritional support as judged by intake, output, daily weight and appropriate laboratories.    I reviewed individual and serial imaging studies including echocardiogram, chest x-ray, CT scan    I personally supervised the prescription of parenteral fluids, inotropes, vasodilators , and vasopressors in order to correct cardiac output, maintain urine output and renal function.    I personally met with patient or family to discuss current and future diagnostic and therapeutic strategies                Assessment and Plan:     Mr Eliseo Tanner is a 63yo M w/PMHx notable for chronic systolic heart failure, NICM, moderate CAD, HTN, ABHINAV on CPAP, DM2, CKDIII who is transferred to [facility] for  evaluation and management of advanced heart failure with arrhythmia.     Today's plan (2/12):  -Continue diuresis with goal negative 1.0L; 4 mg IV Bumex once in AM, reassess HDs in PM  -Continue dobutamine 7, IABP at 1:1  -Continuing work-up for heart transplant  -Try and wean nipride and increase captopril    HDs for 2/12:  CVP 10  PA 44/24  PCWP 22  PA sat 69%  CI 5.5/2.5    PVR 1.7  Hgb 12.5    Moderate CAD  Severe Mitral regurgitation  Chronic nonischemic systolic heart failure w/ reduced EF (15-20%) and exacerbation  NYHA Class III. Echo 1/9/2020: LVEF 15-20%; LVEDd 5.9 cm; 4+ MR. [facility] 11/2019 w/ nonobstructive CAD w/ severe branch disease. Presented with increased wt gain, SOB, LE edema concerning for HF exacerbation, initially concerning for cardiogenic shock. Admitted to [facility] for further evaluation regarding need for advanced HF therapies.   -Financial approval obtained for OHT/LVAD w/u; order set has been placed  -[place] placed: hemodynamics w/ [place] CO/CI q6h  -Telemetry  -Lactic acid, VBG ordered; trend  -Beta-blocker: None due to decompensated HF  -ACEi/ARB/ARNI: Holding home Entresto  mg BID  -Aldosterone antagonist: Holding spironolactone until renal assessed   -Volume status: Hypervolemic on exam. Weight on admit 240. Baseline weight 225-230.  -Diuretic regimen: for 2/12: Bumex 4 mg IV QD  -Continue dobutmaine 7  -Afterload reduction: captopril 6.25 mg PO TID. Will uptitrate for get off nipride. MAP goal 65-75  -Continue ASA 81, atorvastatin 20 mg    BERNARDA on CKDIII  Cr on admission 1.7, baseline Cr 1.5-2. Goal to improve renal function with diuresis in order to make best candidate for potential LVAD.  -Trend Cr  -Daily fluid balance  -Diuresis as above    Precapillary pulmonary hypertension:  Significant transpulmonary gradient of 14 on right heart cath numbers. May be related to CTEPH vs. WHO I.  -Empiric argatroban    #Thrombocytopenia:  Concern for HIT given timing with respect to heparin  "exposure.  -Stopped heparin on 2/11 and started argatroban  -HIT ab sent on 2/11    VFib requiring shock  Shocked x 3 prior to transfer, loaded with amio. No known prior history. Suspect related to underlying HF.   -Keep K>4, Mg>2  -Amio 400 mg PO BID  -Need ICD for secondary prevention     Diabetes Mellitus Type II  Last A1c 7.2% 2/6/20  Home regimen includes: 15U basaglar at bedtime, glipizide 2.5  -Will resume home glargine at 30% reduction, 10U QHS  -Holding home oral regimen while renal function and/or hemodynamic status is either impaired or threatened  -Preprandial blood glucose target <140 mg/dL and random <180mg/dl, or 140-180 mg/dL for critically ill  -SSI insulin, q4h blood sugar checks, hypoglycemia protocol ordered     Chronic:  ABHINAV: Home CPAP  Gout: PTA allopurinol 200 daily  MDD: PTA fluoxetine  GERD: PTA PPI  COPD: albuterol PRN    FEN:  2gm Na   2L water restriction    PPX: Heparin ggt    CODE: FULL CODE     Patient was discussed with staff attending, Dr. Tomas, who agrees with the above assessment and plan.      Stanford Acuna MD  Cardiology Fellow, PGY-5  Pager: [phone]            Subjective/Interval Events     No acute overnight evens.  This AM, patient w/o any acute complaints. Denying any lightheadedness, dizziness, SOB.          Objective     BP (!) 86/58   Pulse 102   Temp 98.1  F (36.7  C) (Oral)   Resp 16   Ht 1.702 m (5' 7\")   Wt 100.5 kg (221 lb 9 oz)   SpO2 93%   BMI 34.70 kg/m    Temp:  [97.7  F (36.5  C)-98.7  F (37.1  C)] 98.1  F (36.7  C)  Heart Rate:  [] 97  Resp:  [16-22] 16  MAP:  [73 mmHg-108 mmHg] 73 mmHg  Arterial Line BP: (103-159)/(50-78) 103/51  SpO2:  [91 %-99 %] 93 %  Wt Readings from Last 2 Encounters:   02/12/20 100.5 kg (221 lb 9 oz)     I/O last 3 completed shifts:  In: 1582.86 [P.O.:600; I.V.:982.86]  Out: 4410 [Urine:4410]      Gen: No acute distress  HEENT: NC/AT, PERRL, EOM intact, MMM, OP without exudates  PULM/THORAX: Clear to auscultation " bilaterally, no rales/rhonchi/wheezes  CV: normal rate, regular rhythm, normal S1 and S2, no murmurs or rubs.  ABD: Soft, NTND, bowel sounds present, no masses  EXT: WWP. No LE edema, clubbing or cyanosis.  NEURO: CN II-XII grossly intact. A&Ox3    Lines:  -R IJ PA catheter              Data:     Recent Results (from the past 24 hour(s))   Glucose by meter    Collection Time: 02/11/20 12:06 PM   Result Value Ref Range    Glucose 171 (H) 70 - 99 mg/dL   Basic metabolic panel    Collection Time: 02/11/20  2:47 PM   Result Value Ref Range    Sodium 136 133 - 144 mmol/L    Potassium 3.7 3.4 - 5.3 mmol/L    Chloride 98 94 - 109 mmol/L    Carbon Dioxide 35 (H) 20 - 32 mmol/L    Anion Gap 3 3 - 14 mmol/L    Glucose 150 (H) 70 - 99 mg/dL    Urea Nitrogen 34 (H) 7 - 30 mg/dL    Creatinine 1.15 0.66 - 1.25 mg/dL    GFR Estimate 66 >60 mL/min/[1.73_m2]    GFR Estimate If Black 76 >60 mL/min/[1.73_m2]    Calcium 8.8 8.5 - 10.1 mg/dL   Blood gas venous with oxyhemoglobin (PCU Collect TBD)    Collection Time: 02/11/20  2:47 PM   Result Value Ref Range    Ph Venous 7.48 (H) 7.32 - 7.43 pH    PCO2 Venous 49 40 - 50 mm Hg    PO2 Venous 33 25 - 47 mm Hg    Bicarbonate Venous 37 (H) 21 - 28 mmol/L    FIO2 2L     Oxyhemoglobin Venous 62 %    Base Excess Venous 11.2 mmol/L   Glucose by meter    Collection Time: 02/11/20  4:01 PM   Result Value Ref Range    Glucose 147 (H) 70 - 99 mg/dL   PTT (AM Draw)    Collection Time: 02/11/20  6:58 PM   Result Value Ref Range    PTT 57 (H) 22 - 37 sec   Blood gas venous with oxyhemoglobin (PCU Collect TBD)    Collection Time: 02/11/20  8:14 PM   Result Value Ref Range    Ph Venous 7.48 (H) 7.32 - 7.43 pH    PCO2 Venous 49 40 - 50 mm Hg    PO2 Venous 28 25 - 47 mm Hg    Bicarbonate Venous 36 (H) 21 - 28 mmol/L    FIO2 2L     Oxyhemoglobin Venous 50 %    Base Excess Venous 10.5 mmol/L   Glucose by meter    Collection Time: 02/11/20  9:30 PM   Result Value Ref Range    Glucose 157 (H) 70 - 99 mg/dL    Hemoglobin    Collection Time: 02/11/20 11:59 PM   Result Value Ref Range    Hemoglobin 14.1 13.3 - 17.7 g/dL   Blood gas venous with oxyhemoglobin (PCU Collect TBD)    Collection Time: 02/11/20 11:59 PM   Result Value Ref Range    Ph Venous 7.51 (H) 7.32 - 7.43 pH    PCO2 Venous 45 40 - 50 mm Hg    PO2 Venous 25 25 - 47 mm Hg    Bicarbonate Venous 36 (H) 21 - 28 mmol/L    FIO2 21     Oxyhemoglobin Venous 45 %    Base Excess Venous 11.6 mmol/L   Blood gas venous with oxyhemoglobin (PCU Collect TBD)    Collection Time: 02/12/20  4:39 AM   Result Value Ref Range    Ph Venous 7.51 (H) 7.32 - 7.43 pH    PCO2 Venous 45 40 - 50 mm Hg    PO2 Venous 38 25 - 47 mm Hg    Bicarbonate Venous 36 (H) 21 - 28 mmol/L    FIO2 3L     Oxyhemoglobin Venous 71 %    Base Excess Venous 11.7 mmol/L   Basic metabolic panel    Collection Time: 02/12/20  4:40 AM   Result Value Ref Range    Sodium 136 133 - 144 mmol/L    Potassium 3.6 3.4 - 5.3 mmol/L    Chloride 96 94 - 109 mmol/L    Carbon Dioxide 32 20 - 32 mmol/L    Anion Gap 9 3 - 14 mmol/L    Glucose 168 (H) 70 - 99 mg/dL    Urea Nitrogen 31 (H) 7 - 30 mg/dL    Creatinine 1.35 (H) 0.66 - 1.25 mg/dL    GFR Estimate 54 (L) >60 mL/min/[1.73_m2]    GFR Estimate If Black 63 >60 mL/min/[1.73_m2]    Calcium 8.4 (L) 8.5 - 10.1 mg/dL   ABO subtyping: Laboratory Miscellaneous Order    Collection Time: 02/12/20  4:40 AM   Result Value Ref Range    Miscellaneous Test Test reordered as correct code      ABO subtyping: Laboratory Miscellaneous Order    Collection Time: 02/12/20  4:40 AM   Result Value Ref Range    Miscellaneous Test Test reordered as correct code      Hemoglobin plasma    Collection Time: 02/12/20  4:40 AM   Result Value Ref Range    Hemoglobin Plasma <30 <30 mg/dL   Magnesium    Collection Time: 02/12/20  4:40 AM   Result Value Ref Range    Magnesium 1.3 (L) 1.6 - 2.3 mg/dL   Phosphorus    Collection Time: 02/12/20  4:40 AM   Result Value Ref Range    Phosphorus 3.1 2.5 - 4.5 mg/dL    PTT (AM Draw)    Collection Time: 20  4:40 AM   Result Value Ref Range    PTT 69 (H) 22 - 37 sec   CBC with platelets differential    Collection Time: 20  4:40 AM   Result Value Ref Range    WBC 6.0 4.0 - 11.0 10e9/L    RBC Count 4.40 4.4 - 5.9 10e12/L    Hemoglobin 12.5 (L) 13.3 - 17.7 g/dL    Hematocrit 38.4 (L) 40.0 - 53.0 %    MCV 87 78 - 100 fl    MCH 28.4 26.5 - 33.0 pg    MCHC 32.6 31.5 - 36.5 g/dL    RDW 18.1 (H) 10.0 - 15.0 %    Platelet Count 120 (L) 150 - 450 10e9/L    Diff Method Automated Method     % Neutrophils 88.3 %    % Lymphocytes 4.8 %    % Monocytes 5.8 %    % Eosinophils 0.0 %    % Basophils 0.3 %    % Immature Granulocytes 0.8 %    Nucleated RBCs 0 0 /100    Absolute Neutrophil 5.3 1.6 - 8.3 10e9/L    Absolute Lymphocytes 0.3 (L) 0.8 - 5.3 10e9/L    Absolute Monocytes 0.4 0.0 - 1.3 10e9/L    Absolute Eosinophils 0.0 0.0 - 0.7 10e9/L    Absolute Basophils 0.0 0.0 - 0.2 10e9/L    Abs Immature Granulocytes 0.1 0 - 0.4 10e9/L    Absolute Nucleated RBC 0.0    Glucose by meter    Collection Time: 20  7:32 AM   Result Value Ref Range    Glucose 170 (H) 70 - 99 mg/dL   Blood gas venous with oxyhemoglobin (PCU Collect TBD)    Collection Time: 20  7:35 AM   Result Value Ref Range    Ph Venous 7.50 (H) 7.32 - 7.43 pH    PCO2 Venous 48 40 - 50 mm Hg    PO2 Venous 37 25 - 47 mm Hg    Bicarbonate Venous 37 (H) 21 - 28 mmol/L    FIO2 3L     Oxyhemoglobin Venous 69 %    Base Excess Venous 11.8 mmol/L       Recent Results (from the past 24 hour(s))   Echocardiogram Complete    Narrative    [id number]  [id number]  [id number]  [id number]^MATT^NAHUN^JIM           [facility],[place]  Echocardiography Laboratory  [address] [place] [address]     Name: WOLFGANG LEACH  MRN: [id number]  : 1955  Study Date: 2020 10:06 AM  Age: 64 yrs  Gender: Male  Patient Location: [facility]  Reason For Study: Heart Failure  Ordering Physician: MATT  NAHUN  Referring Physician: SELF, REFERRED  Performed By: Yvonne Adan RDCS     BSA: 2.2 m2  Height: 67 in  Weight: 244 lb  HR: 103  BP: 72/52 mmHg  _____________________________________________________________________________  __        Procedure  Complete Portable Echo Adult. Contrast Optison. Optison (NDC #1903-0457-77)  given intravenously. Patient was given 6 ml mixture of 3 ml Optison and 6 ml  saline. 3 ml wasted.  _____________________________________________________________________________  __        Interpretation Summary  Left ventricular size is normal.  LVEF 20% based on biplane 2D tracing.  Mild to moderate right ventricular dilation is present.  Global right ventricular function is moderately reduced.  Pulmonary artery systolic pressure is normal.  Dilation of the inferior vena cava is present with abnormal respiratory  variation in diameter.  _____________________________________________________________________________  __        Left Ventricle  Left ventricular size is normal. LVEF 20% based on biplane 2D tracing. Left  ventricular wall thickness is normal. Diastolic function not assessed due to  frequent ectopy. Abnormal septal motion consistent with left bundle branch  block is present.     Right Ventricle  Mild to moderate right ventricular dilation is present. Global right  ventricular function is moderately reduced.     Atria  Mild biatrial enlargement is present.     Mitral Valve  Moderate mitral insufficiency is present.        Aortic Valve  Aortic valve is normal in structure and function.     Tricuspid Valve  Moderate tricuspid insufficiency is present. The right ventricular systolic  pressure is approximated at 24.4 mmHg plus the right atrial pressure.  Pulmonary artery systolic pressure is normal.     Pulmonic Valve  The pulmonic valve cannot be assessed. Mild pulmonic insufficiency is present.     Vessels  The aorta root is normal. The pulmonary artery cannot be assessed. Dilation  of  the inferior vena cava is present with abnormal respiratory variation in  diameter.     Compared to Previous Study  Previous study not available for comparison.     _____________________________________________________________________________  __  MMode/2D Measurements & Calculations  IVSd: 0.61 cm  LVIDd: 4.7 cm  LVIDs: 3.7 cm  LVPWd: 0.75 cm  FS: 21.7 %  LV mass(C)d: 99.1 grams  LV mass(C)dI: 45.0 grams/m2     Ao root diam: 2.9 cm  asc Aorta Diam: 2.5 cm  LVOT diam: 1.9 cm  LVOT area: 2.8 cm2     EF(MOD-bp): 21.5 %  LA Volume (BP): 84.8 ml  LA Volume Index (BP): 38.5 ml/m2  RWT: 0.32        Doppler Measurements & Calculations  PA acc time: 0.09 sec     PI end-d saumya: 154.0 cm/sec  TR max saumya: 247.0 cm/sec  TR max P.4 mmHg     _____________________________________________________________________________  __           Report approved by: Graciela Tomlinson 2020 12:05 PM      XR Chest Port 1 View    Narrative    XR CHEST PORT 1 VW  2020 4:21 PM      HISTORY: SG Placement    COMPARISON: None available    FINDINGS: Single AP chest radiograph. [place] Plane-Chucky catheter tip is in the  proximal right main pulmonary artery. Right central line tip and right  upper extremity PICC line tip at the lower SVC. Trachea is midline,  cardiomegaly. Bilateral perihilar streaky opacities. Mild increased  interstitial opacities in the right lung with ill-defined pulmonary  vascularity. No pneumothorax or pleural effusion. No acute osseous or  abdominal abnormality. Elevated left hemidiaphragm.      Impression    IMPRESSION:  1. [place] Plane-Chucky catheter tip at the proximal right main pulmonary artery.  2. Cardiomegaly with mild pulmonary edema, especially on the right.    I have personally reviewed the examination and initial interpretation  and I agree with the findings.    DONG SOLORIO MD   Cardiac Catheterization    Narrative      Successful insertion of a IABP in the RFA                Medications     Current  Facility-Administered Medications   Medication     acetaminophen (TYLENOL) tablet 650 mg     albuterol (PROAIR HFA/PROVENTIL HFA/VENTOLIN HFA) 108 (90 Base) MCG/ACT inhaler 2 puff     allopurinol (ZYLOPRIM) tablet 200 mg     amiodarone (PACERONE/CODARONE) tablet 400 mg     argatroban (ACOVA) 1 mg/mL ANTICOAGULANT infusion     aspirin (ASA) chewable tablet 81 mg     atorvastatin (LIPITOR) tablet 20 mg     [START ON 2/13/2020] bumetanide (BUMEX) injection 4 mg     calcium carbonate (TUMS) chewable tablet 500 mg     captopril (CAPOTEN) half-tab 6.25 mg     co-enzyme Q-10 capsule 200 mg     glucose gel 15-30 g    Or     dextrose 50 % injection 25-50 mL    Or     glucagon injection 1 mg     diclofenac (VOLTAREN) 1 % topical gel 4 g     diphenhydrAMINE (BENADRYL) injection 50 mg     DOBUTamine (DOBUTREX) 1,000 mg in D5W 250 mL infusion (adult max conc)     EPINEPHrine (ADRENALIN) kit 0.3 mg     fentaNYL (PF) (SUBLIMAZE) injection 25 mcg     FLUoxetine (PROzac) capsule 20 mg     insulin aspart (NovoLOG) inj (RAPID ACTING)     insulin aspart (NovoLOG) inj (RAPID ACTING)     insulin glargine (LANTUS PEN) injection 10 Units     lidocaine (LMX4) cream     lidocaine 1 % 0.1-1 mL     magnesium oxide (MAG-OX) tablet 400 mg     magnesium sulfate 2 g in water intermittent infusion     magnesium sulfate 4 g in 100 mL sterile water (premade)     medication instruction     melatonin tablet 3 mg     methylPREDNISolone sodium succinate (solu-MEDROL) injection 125 mg     naloxone (NARCAN) injection 0.1-0.4 mg     nitroPRUsside (NIPRIDE) 100 mg, sodium thiosulfate 1,000 mg in D5W 62.5 mL IV infusion (conc: 1.6mg/mL)     nitroPRUsside (NIPRIDE) 50 mg, sodium thiosulfate 500 mg in D5W 125 mL IV infusion (conc: 0.4mg/mL)     omeprazole (priLOSEC) CR capsule 20 mg     potassium chloride (KLOR-CON) Packet 20-40 mEq     potassium chloride 10 mEq in 100 mL intermittent infusion with 10 mg lidocaine     potassium chloride 10 mEq in 100 mL sterile  water intermittent infusion (premix)     potassium chloride 20 mEq in 50 mL intermittent infusion     potassium chloride ER (K-DUR/KLOR-CON M) CR tablet 20-40 mEq     potassium phosphate 10 mmol in D5W 250 mL intermittent infusion     potassium phosphate 15 mmol in D5W 250 mL intermittent infusion     potassium phosphate 20 mmol in D5W 250 mL intermittent infusion     potassium phosphate 20 mmol in D5W 500 mL intermittent infusion     potassium phosphate 25 mmol in D5W 500 mL intermittent infusion     ranitidine (ZANTAC) injection 50 mg     sodium chloride (PF) 0.9% PF flush 3 mL     sodium chloride (PF) 0.9% PF flush 3 mL     vitamin B1 (THIAMINE) tablet 100 mg

## 2020-02-13 ENCOUNTER — APPOINTMENT (OUTPATIENT)
Dept: GENERAL RADIOLOGY | Facility: CLINIC | Age: 65
DRG: 001 | End: 2020-02-13
Attending: STUDENT IN AN ORGANIZED HEALTH CARE EDUCATION/TRAINING PROGRAM
Payer: MEDICARE

## 2020-02-13 LAB
A* LOCUS: NORMAL
ABTEST METHOD: NORMAL
ALBUMIN SERPL-MCNC: 2.7 G/DL (ref 3.4–5)
ALBUMIN SERPL-MCNC: 3 G/DL (ref 3.4–5)
ALBUMIN UR-MCNC: 300 MG/DL
ALP SERPL-CCNC: 150 U/L (ref 40–150)
ALP SERPL-CCNC: 177 U/L (ref 40–150)
ALT SERPL W P-5'-P-CCNC: 38 U/L (ref 0–70)
ALT SERPL W P-5'-P-CCNC: 58 U/L (ref 0–70)
ANION GAP SERPL CALCULATED.3IONS-SCNC: 10 MMOL/L (ref 3–14)
ANION GAP SERPL CALCULATED.3IONS-SCNC: 10 MMOL/L (ref 3–14)
ANION GAP SERPL CALCULATED.3IONS-SCNC: 11 MMOL/L (ref 3–14)
ANION GAP SERPL CALCULATED.3IONS-SCNC: 13 MMOL/L (ref 3–14)
ANION GAP SERPL CALCULATED.3IONS-SCNC: 14 MMOL/L (ref 3–14)
APPEARANCE UR: ABNORMAL
APTT PPP: 103 SEC (ref 22–37)
AST SERPL W P-5'-P-CCNC: 40 U/L (ref 0–45)
AST SERPL W P-5'-P-CCNC: 83 U/L (ref 0–45)
B* LOCUS: NORMAL
B*: NORMAL
BASE DEFICIT BLDA-SCNC: 3 MMOL/L
BASE DEFICIT BLDV-SCNC: 4.3 MMOL/L
BASE EXCESS BLDA CALC-SCNC: 0.7 MMOL/L
BASE EXCESS BLDV CALC-SCNC: 0.7 MMOL/L
BASE EXCESS BLDV CALC-SCNC: 1 MMOL/L
BASE EXCESS BLDV CALC-SCNC: 1 MMOL/L
BASE EXCESS BLDV CALC-SCNC: 11.4 MMOL/L
BASE EXCESS BLDV CALC-SCNC: 2.6 MMOL/L
BASE EXCESS BLDV CALC-SCNC: 4.2 MMOL/L
BASE EXCESS BLDV CALC-SCNC: 6 MMOL/L
BASE EXCESS BLDV CALC-SCNC: 6.2 MMOL/L
BASE EXCESS BLDV CALC-SCNC: 8.1 MMOL/L
BASE EXCESS BLDV CALC-SCNC: 8.8 MMOL/L
BASOPHILS # BLD AUTO: 0.1 10E9/L (ref 0–0.2)
BASOPHILS NFR BLD AUTO: 0.6 %
BILIRUB DIRECT SERPL-MCNC: 0.4 MG/DL (ref 0–0.2)
BILIRUB SERPL-MCNC: 0.8 MG/DL (ref 0.2–1.3)
BILIRUB SERPL-MCNC: 1.8 MG/DL (ref 0.2–1.3)
BILIRUB UR QL STRIP: NEGATIVE
BLOOD BANK CMNT PATIENT-IMP: NORMAL
BUN SERPL-MCNC: 34 MG/DL (ref 7–30)
BUN SERPL-MCNC: 35 MG/DL (ref 7–30)
BUN SERPL-MCNC: 38 MG/DL (ref 7–30)
BUN SERPL-MCNC: 42 MG/DL (ref 7–30)
BUN SERPL-MCNC: 45 MG/DL (ref 7–30)
BW-1: NORMAL
C* LOCUS: NORMAL
C*: NORMAL
CA-I BLD-MCNC: 4.3 MG/DL (ref 4.4–5.2)
CA-I SERPL ISE-MCNC: 4.1 MG/DL (ref 4.4–5.2)
CALCIUM SERPL-MCNC: 8.2 MG/DL (ref 8.5–10.1)
CALCIUM SERPL-MCNC: 8.3 MG/DL (ref 8.5–10.1)
CALCIUM SERPL-MCNC: 8.3 MG/DL (ref 8.5–10.1)
CALCIUM SERPL-MCNC: 8.9 MG/DL (ref 8.5–10.1)
CALCIUM SERPL-MCNC: 8.9 MG/DL (ref 8.5–10.1)
CELL TYPE AUTO: NORMAL
CHANNELSHIFTAUTOB1: -34
CHANNELSHIFTAUTOT1: -25
CHLORIDE SERPL-SCNC: 93 MMOL/L (ref 94–109)
CHLORIDE SERPL-SCNC: 94 MMOL/L (ref 94–109)
CHLORIDE SERPL-SCNC: 96 MMOL/L (ref 94–109)
CK SERPL-CCNC: 39 U/L (ref 30–300)
CO2 SERPL-SCNC: 22 MMOL/L (ref 20–32)
CO2 SERPL-SCNC: 22 MMOL/L (ref 20–32)
CO2 SERPL-SCNC: 24 MMOL/L (ref 20–32)
CO2 SERPL-SCNC: 26 MMOL/L (ref 20–32)
CO2 SERPL-SCNC: 29 MMOL/L (ref 20–32)
COLOR UR AUTO: ABNORMAL
CREAT SERPL-MCNC: 1.58 MG/DL (ref 0.66–1.25)
CREAT SERPL-MCNC: 1.72 MG/DL (ref 0.66–1.25)
CREAT SERPL-MCNC: 2.09 MG/DL (ref 0.66–1.25)
CREAT SERPL-MCNC: 2.21 MG/DL (ref 0.66–1.25)
CREAT SERPL-MCNC: 2.31 MG/DL (ref 0.66–1.25)
CROSSMATCHDATEAUTO: NORMAL
DIFFERENTIAL METHOD BLD: ABNORMAL
DONOR AUTO: NORMAL
DONORCELLDATE AUTO: NORMAL
DPA1* LOCUS NMDP: NORMAL
DPA1* NMDP: NORMAL
DPA1*: NORMAL
DPA1*LOCUS: NORMAL
DPB1* LOCUS NMDP: NORMAL
DPB1* NMDP: NORMAL
DPB1*: NORMAL
DPB1*LOCUS: NORMAL
DQA1*: NORMAL
DQA1*LOCUS: NORMAL
DQB1* LOCUS: NORMAL
DQB1*: NORMAL
DRB1* LOCUS: NORMAL
DRB1*: NORMAL
DRB4* LOCUS: NORMAL
DRB5*: NORMAL
DRSSO TEST METHOD: NORMAL
EOSINOPHIL # BLD AUTO: 0 10E9/L (ref 0–0.7)
EOSINOPHIL NFR BLD AUTO: 0.1 %
ERYTHROCYTE [DISTWIDTH] IN BLOOD BY AUTOMATED COUNT: 18.6 % (ref 10–15)
ERYTHROCYTE [DISTWIDTH] IN BLOOD BY AUTOMATED COUNT: 18.6 % (ref 10–15)
FLUAV+FLUBV RNA SPEC QL NAA+PROBE: NEGATIVE
FLUAV+FLUBV RNA SPEC QL NAA+PROBE: NEGATIVE
GAMMA INTERFERON BACKGROUND BLD IA-ACNC: 1.13 IU/ML
GFR SERPL CREATININE-BSD FRML MDRD: 28 ML/MIN/{1.73_M2}
GFR SERPL CREATININE-BSD FRML MDRD: 30 ML/MIN/{1.73_M2}
GFR SERPL CREATININE-BSD FRML MDRD: 32 ML/MIN/{1.73_M2}
GFR SERPL CREATININE-BSD FRML MDRD: 40 ML/MIN/{1.73_M2}
GFR SERPL CREATININE-BSD FRML MDRD: 45 ML/MIN/{1.73_M2}
GLUCOSE BLDC GLUCOMTR-MCNC: 107 MG/DL (ref 70–99)
GLUCOSE BLDC GLUCOMTR-MCNC: 151 MG/DL (ref 70–99)
GLUCOSE BLDC GLUCOMTR-MCNC: 178 MG/DL (ref 70–99)
GLUCOSE BLDC GLUCOMTR-MCNC: 200 MG/DL (ref 70–99)
GLUCOSE BLDC GLUCOMTR-MCNC: 225 MG/DL (ref 70–99)
GLUCOSE SERPL-MCNC: 154 MG/DL (ref 70–99)
GLUCOSE SERPL-MCNC: 165 MG/DL (ref 70–99)
GLUCOSE SERPL-MCNC: 169 MG/DL (ref 70–99)
GLUCOSE SERPL-MCNC: 228 MG/DL (ref 70–99)
GLUCOSE SERPL-MCNC: 262 MG/DL (ref 70–99)
GLUCOSE UR STRIP-MCNC: 30 MG/DL
HCO3 BLD-SCNC: 21 MMOL/L (ref 21–28)
HCO3 BLD-SCNC: 25 MMOL/L (ref 21–28)
HCO3 BLDV-SCNC: 20 MMOL/L (ref 21–28)
HCO3 BLDV-SCNC: 27 MMOL/L (ref 21–28)
HCO3 BLDV-SCNC: 28 MMOL/L (ref 21–28)
HCO3 BLDV-SCNC: 31 MMOL/L (ref 21–28)
HCO3 BLDV-SCNC: 32 MMOL/L (ref 21–28)
HCO3 BLDV-SCNC: 32 MMOL/L (ref 21–28)
HCO3 BLDV-SCNC: 33 MMOL/L (ref 21–28)
HCO3 BLDV-SCNC: 34 MMOL/L (ref 21–28)
HCO3 BLDV-SCNC: 36 MMOL/L (ref 21–28)
HCT VFR BLD AUTO: 42.2 % (ref 40–53)
HCT VFR BLD AUTO: 44.5 % (ref 40–53)
HGB BLD-MCNC: 13.3 G/DL (ref 13.3–17.7)
HGB BLD-MCNC: 14.5 G/DL (ref 13.3–17.7)
HGB UR QL STRIP: ABNORMAL
HYALINE CASTS #/AREA URNS LPF: 15 /LPF (ref 0–2)
IMM GRANULOCYTES # BLD: 0.3 10E9/L (ref 0–0.4)
IMM GRANULOCYTES NFR BLD: 1.7 %
INR PPP: 1.55 (ref 0.86–1.14)
KCT BLD-ACNC: 154 SEC (ref 75–150)
KETONES UR STRIP-MCNC: NEGATIVE MG/DL
LACTATE BLD-SCNC: 2 MMOL/L (ref 0.7–2)
LACTATE BLD-SCNC: 2.2 MMOL/L (ref 0.7–2)
LACTATE BLD-SCNC: 2.3 MMOL/L (ref 0.7–2)
LACTATE BLD-SCNC: 2.3 MMOL/L (ref 0.7–2)
LACTATE BLD-SCNC: 2.9 MMOL/L (ref 0.7–2)
LACTATE BLD-SCNC: 3 MMOL/L (ref 0.7–2)
LACTATE BLD-SCNC: 3.4 MMOL/L (ref 0.7–2)
LACTATE BLD-SCNC: 3.9 MMOL/L (ref 0.7–2)
LACTATE BLD-SCNC: 5.9 MMOL/L (ref 0.7–2)
LACTATE BLD-SCNC: 9.5 MMOL/L (ref 0.7–2)
LEUKOCYTE ESTERASE UR QL STRIP: ABNORMAL
LMWH PPP CHRO-ACNC: 0.51 IU/ML
LYMPHOCYTES # BLD AUTO: 0.4 10E9/L (ref 0.8–5.3)
LYMPHOCYTES NFR BLD AUTO: 2.6 %
M TB IFN-G BLD-IMP: NEGATIVE
M TB IFN-G CD4+ BCKGRND COR BLD-ACNC: 5.96 IU/ML
MAGNESIUM SERPL-MCNC: 2 MG/DL (ref 1.6–2.3)
MAGNESIUM SERPL-MCNC: 2.3 MG/DL (ref 1.6–2.3)
MAGNESIUM SERPL-MCNC: 2.3 MG/DL (ref 1.6–2.3)
MCH RBC QN AUTO: 27.7 PG (ref 26.5–33)
MCH RBC QN AUTO: 28.4 PG (ref 26.5–33)
MCHC RBC AUTO-ENTMCNC: 31.5 G/DL (ref 31.5–36.5)
MCHC RBC AUTO-ENTMCNC: 32.6 G/DL (ref 31.5–36.5)
MCV RBC AUTO: 87 FL (ref 78–100)
MCV RBC AUTO: 88 FL (ref 78–100)
MITOGEN IGNF BCKGRD COR BLD-ACNC: 0 IU/ML
MITOGEN IGNF BCKGRD COR BLD-ACNC: 0.06 IU/ML
MONOCYTES # BLD AUTO: 0.4 10E9/L (ref 0–1.3)
MONOCYTES NFR BLD AUTO: 2.5 %
MUCOUS THREADS #/AREA URNS LPF: PRESENT /LPF
NEUTROPHILS # BLD AUTO: 14.8 10E9/L (ref 1.6–8.3)
NEUTROPHILS NFR BLD AUTO: 92.5 %
NITRATE UR QL: NEGATIVE
NRBC # BLD AUTO: 0 10*3/UL
NRBC BLD AUTO-RTO: 0 /100
O2/TOTAL GAS SETTING VFR VENT: 21 %
O2/TOTAL GAS SETTING VFR VENT: ABNORMAL %
O2/TOTAL GAS SETTING VFR VENT: NORMAL %
OXYHGB MFR BLD: 96 % (ref 92–100)
OXYHGB MFR BLD: 98 % (ref 92–100)
OXYHGB MFR BLDV: 29 %
OXYHGB MFR BLDV: 30 %
OXYHGB MFR BLDV: 33 %
OXYHGB MFR BLDV: 35 %
OXYHGB MFR BLDV: 38 %
OXYHGB MFR BLDV: 38 %
OXYHGB MFR BLDV: 40 %
OXYHGB MFR BLDV: 45 %
OXYHGB MFR BLDV: 48 %
OXYHGB MFR BLDV: 53 %
OXYHGB MFR BLDV: 63 %
PCO2 BLD: 33 MM HG (ref 35–45)
PCO2 BLD: 37 MM HG (ref 35–45)
PCO2 BLDV: 35 MM HG (ref 40–50)
PCO2 BLDV: 43 MM HG (ref 40–50)
PCO2 BLDV: 43 MM HG (ref 40–50)
PCO2 BLDV: 45 MM HG (ref 40–50)
PCO2 BLDV: 45 MM HG (ref 40–50)
PCO2 BLDV: 49 MM HG (ref 40–50)
PCO2 BLDV: 50 MM HG (ref 40–50)
PCO2 BLDV: 51 MM HG (ref 40–50)
PCO2 BLDV: 51 MM HG (ref 40–50)
PH BLD: 7.41 PH (ref 7.35–7.45)
PH BLD: 7.44 PH (ref 7.35–7.45)
PH BLDV: 7.35 PH (ref 7.32–7.43)
PH BLDV: 7.37 PH (ref 7.32–7.43)
PH BLDV: 7.38 PH (ref 7.32–7.43)
PH BLDV: 7.42 PH (ref 7.32–7.43)
PH BLDV: 7.48 PH (ref 7.32–7.43)
PH BLDV: 7.49 PH (ref 7.32–7.43)
PH BLDV: 7.51 PH (ref 7.32–7.43)
PH UR STRIP: 6 PH (ref 5–7)
PHOSPHATE SERPL-MCNC: 5.4 MG/DL (ref 2.5–4.5)
PHOSPHATE SERPL-MCNC: 5.5 MG/DL (ref 2.5–4.5)
PLATELET # BLD AUTO: 88 10E9/L (ref 150–450)
PLATELET # BLD AUTO: 92 10E9/L (ref 150–450)
PO2 BLD: 124 MM HG (ref 80–105)
PO2 BLD: 85 MM HG (ref 80–105)
PO2 BLDV: 21 MM HG (ref 25–47)
PO2 BLDV: 23 MM HG (ref 25–47)
PO2 BLDV: 24 MM HG (ref 25–47)
PO2 BLDV: 24 MM HG (ref 25–47)
PO2 BLDV: 25 MM HG (ref 25–47)
PO2 BLDV: 28 MM HG (ref 25–47)
PO2 BLDV: 29 MM HG (ref 25–47)
PO2 BLDV: 31 MM HG (ref 25–47)
PO2 BLDV: 33 MM HG (ref 25–47)
POS CUT OFF AUTO B: >93
POS CUT OFF AUTO T: >79
POTASSIUM BLD-SCNC: 3.8 MMOL/L (ref 3.4–5.3)
POTASSIUM BLD-SCNC: 4.2 MMOL/L (ref 3.4–5.3)
POTASSIUM SERPL-SCNC: 4 MMOL/L (ref 3.4–5.3)
POTASSIUM SERPL-SCNC: 4.1 MMOL/L (ref 3.4–5.3)
POTASSIUM SERPL-SCNC: 4.1 MMOL/L (ref 3.4–5.3)
POTASSIUM SERPL-SCNC: 4.4 MMOL/L (ref 3.4–5.3)
POTASSIUM SERPL-SCNC: 4.7 MMOL/L (ref 3.4–5.3)
PROT SERPL-MCNC: 7.3 G/DL (ref 6.8–8.8)
PROT SERPL-MCNC: 7.4 G/DL (ref 6.8–8.8)
PSA FREE MFR SERPL: 38 %
PSA FREE SERPL-MCNC: 0.3 NG/ML
PSA SERPL-MCNC: 0.8 NG/ML (ref 0–4)
RBC # BLD AUTO: 4.8 10E12/L (ref 4.4–5.9)
RBC # BLD AUTO: 5.11 10E12/L (ref 4.4–5.9)
RBC #/AREA URNS AUTO: >182 /HPF (ref 0–2)
RESULT AUTO B1: NORMAL
RESULT AUTO T1: NORMAL
RSV RNA SPEC NAA+PROBE: NEGATIVE
SA1 CELL: NORMAL
SA1 COMMENTS: NORMAL
SA1 HI RISK ABY: NORMAL
SA1 MOD RISK ABY: NORMAL
SA1 TEST METHOD: NORMAL
SA2 CELL: NORMAL
SA2 COMMENTS: NORMAL
SA2 HI RISK ABY UA: NORMAL
SA2 MOD RISK ABY: NORMAL
SA2 TEST METHOD: NORMAL
SERUM DATE AUTO B1: NORMAL
SERUM DATE AUTO T1: NORMAL
SODIUM SERPL-SCNC: 130 MMOL/L (ref 133–144)
SODIUM SERPL-SCNC: 131 MMOL/L (ref 133–144)
SODIUM SERPL-SCNC: 131 MMOL/L (ref 133–144)
SODIUM SERPL-SCNC: 132 MMOL/L (ref 133–144)
SODIUM SERPL-SCNC: 132 MMOL/L (ref 133–144)
SOURCE: ABNORMAL
SP GR UR STRIP: 1.02 (ref 1–1.03)
SPECIMEN SOURCE: NORMAL
TESTMETHODAUTO: NORMAL
TOBRAMYCIN SERPL-MCNC: 13 MG/L
TREATMENT AUTO B1: NORMAL
TREATMENT AUTO T1: NORMAL
TROPONIN I SERPL-MCNC: 0.09 UG/L (ref 0–0.04)
UNACCEPTABLE ANTIGEN: NORMAL
UNOS CPRA: 0
UROBILINOGEN UR STRIP-MCNC: NORMAL MG/DL (ref 0–2)
WBC # BLD AUTO: 1.9 10E9/L (ref 4–11)
WBC # BLD AUTO: 15.9 10E9/L (ref 4–11)
WBC #/AREA URNS AUTO: 81 /HPF (ref 0–5)

## 2020-02-13 PROCEDURE — 87631 RESP VIRUS 3-5 TARGETS: CPT | Performed by: STUDENT IN AN ORGANIZED HEALTH CARE EDUCATION/TRAINING PROGRAM

## 2020-02-13 PROCEDURE — 84132 ASSAY OF SERUM POTASSIUM: CPT

## 2020-02-13 PROCEDURE — 84132 ASSAY OF SERUM POTASSIUM: CPT | Performed by: STUDENT IN AN ORGANIZED HEALTH CARE EDUCATION/TRAINING PROGRAM

## 2020-02-13 PROCEDURE — 83735 ASSAY OF MAGNESIUM: CPT | Performed by: STUDENT IN AN ORGANIZED HEALTH CARE EDUCATION/TRAINING PROGRAM

## 2020-02-13 PROCEDURE — 36415 COLL VENOUS BLD VENIPUNCTURE: CPT | Performed by: STUDENT IN AN ORGANIZED HEALTH CARE EDUCATION/TRAINING PROGRAM

## 2020-02-13 PROCEDURE — 02HP32Z INSERTION OF MONITORING DEVICE INTO PULMONARY TRUNK, PERCUTANEOUS APPROACH: ICD-10-PCS | Performed by: INTERNAL MEDICINE

## 2020-02-13 PROCEDURE — 83605 ASSAY OF LACTIC ACID: CPT

## 2020-02-13 PROCEDURE — 25800030 ZZH RX IP 258 OP 636: Performed by: STUDENT IN AN ORGANIZED HEALTH CARE EDUCATION/TRAINING PROGRAM

## 2020-02-13 PROCEDURE — 25000128 H RX IP 250 OP 636

## 2020-02-13 PROCEDURE — 80048 BASIC METABOLIC PNL TOTAL CA: CPT | Performed by: INTERNAL MEDICINE

## 2020-02-13 PROCEDURE — 25800030 ZZH RX IP 258 OP 636: Performed by: INTERNAL MEDICINE

## 2020-02-13 PROCEDURE — 25000125 ZZHC RX 250: Performed by: INTERNAL MEDICINE

## 2020-02-13 PROCEDURE — 33967 INSERT I-AORT PERCUT DEVICE: CPT | Performed by: INTERNAL MEDICINE

## 2020-02-13 PROCEDURE — 80048 BASIC METABOLIC PNL TOTAL CA: CPT | Performed by: STUDENT IN AN ORGANIZED HEALTH CARE EDUCATION/TRAINING PROGRAM

## 2020-02-13 PROCEDURE — 87077 CULTURE AEROBIC IDENTIFY: CPT | Performed by: STUDENT IN AN ORGANIZED HEALTH CARE EDUCATION/TRAINING PROGRAM

## 2020-02-13 PROCEDURE — 40000076 ZZH STATISTIC IABP MONITORING

## 2020-02-13 PROCEDURE — 25000132 ZZH RX MED GY IP 250 OP 250 PS 637: Mod: GY

## 2020-02-13 PROCEDURE — 71045 X-RAY EXAM CHEST 1 VIEW: CPT

## 2020-02-13 PROCEDURE — 33967 INSERT I-AORT PERCUT DEVICE: CPT | Mod: GC | Performed by: INTERNAL MEDICINE

## 2020-02-13 PROCEDURE — 40000556 ZZH STATISTIC PERIPHERAL IV START W US GUIDANCE

## 2020-02-13 PROCEDURE — 85730 THROMBOPLASTIN TIME PARTIAL: CPT | Performed by: STUDENT IN AN ORGANIZED HEALTH CARE EDUCATION/TRAINING PROGRAM

## 2020-02-13 PROCEDURE — 93503 INSERT/PLACE HEART CATHETER: CPT | Performed by: INTERNAL MEDICINE

## 2020-02-13 PROCEDURE — 85520 HEPARIN ASSAY: CPT | Performed by: STUDENT IN AN ORGANIZED HEALTH CARE EDUCATION/TRAINING PROGRAM

## 2020-02-13 PROCEDURE — 80053 COMPREHEN METABOLIC PANEL: CPT | Performed by: STUDENT IN AN ORGANIZED HEALTH CARE EDUCATION/TRAINING PROGRAM

## 2020-02-13 PROCEDURE — 83735 ASSAY OF MAGNESIUM: CPT | Performed by: INTERNAL MEDICINE

## 2020-02-13 PROCEDURE — 27210305 ZZH CATH BALLOON IABP

## 2020-02-13 PROCEDURE — 82330 ASSAY OF CALCIUM: CPT

## 2020-02-13 PROCEDURE — 82330 ASSAY OF CALCIUM: CPT | Performed by: INTERNAL MEDICINE

## 2020-02-13 PROCEDURE — 25000128 H RX IP 250 OP 636: Performed by: INTERNAL MEDICINE

## 2020-02-13 PROCEDURE — 40000196 ZZH STATISTIC RAPCV CVP MONITORING

## 2020-02-13 PROCEDURE — 84100 ASSAY OF PHOSPHORUS: CPT | Performed by: STUDENT IN AN ORGANIZED HEALTH CARE EDUCATION/TRAINING PROGRAM

## 2020-02-13 PROCEDURE — 25000128 H RX IP 250 OP 636: Performed by: STUDENT IN AN ORGANIZED HEALTH CARE EDUCATION/TRAINING PROGRAM

## 2020-02-13 PROCEDURE — 83605 ASSAY OF LACTIC ACID: CPT | Performed by: STUDENT IN AN ORGANIZED HEALTH CARE EDUCATION/TRAINING PROGRAM

## 2020-02-13 PROCEDURE — C1894 INTRO/SHEATH, NON-LASER: HCPCS | Performed by: INTERNAL MEDICINE

## 2020-02-13 PROCEDURE — 84100 ASSAY OF PHOSPHORUS: CPT | Performed by: INTERNAL MEDICINE

## 2020-02-13 PROCEDURE — 93010 ELECTROCARDIOGRAM REPORT: CPT | Performed by: INTERNAL MEDICINE

## 2020-02-13 PROCEDURE — 85347 COAGULATION TIME ACTIVATED: CPT

## 2020-02-13 PROCEDURE — 40000141 ZZH STATISTIC PERIPHERAL IV START W/O US GUIDANCE

## 2020-02-13 PROCEDURE — 90947 DIALYSIS REPEATED EVAL: CPT

## 2020-02-13 PROCEDURE — 25000132 ZZH RX MED GY IP 250 OP 250 PS 637: Mod: GY | Performed by: STUDENT IN AN ORGANIZED HEALTH CARE EDUCATION/TRAINING PROGRAM

## 2020-02-13 PROCEDURE — 27210794 ZZH OR GENERAL SUPPLY STERILE: Performed by: INTERNAL MEDICINE

## 2020-02-13 PROCEDURE — 81001 URINALYSIS AUTO W/SCOPE: CPT | Performed by: STUDENT IN AN ORGANIZED HEALTH CARE EDUCATION/TRAINING PROGRAM

## 2020-02-13 PROCEDURE — 40000014 ZZH STATISTIC ARTERIAL MONITORING DAILY

## 2020-02-13 PROCEDURE — 4A133B3 MONITORING OF ARTERIAL PRESSURE, PULMONARY, PERCUTANEOUS APPROACH: ICD-10-PCS | Performed by: INTERNAL MEDICINE

## 2020-02-13 PROCEDURE — 40000986 XR CHEST PORT 1 VW

## 2020-02-13 PROCEDURE — 36620 INSERTION CATHETER ARTERY: CPT | Performed by: INTERNAL MEDICINE

## 2020-02-13 PROCEDURE — 82550 ASSAY OF CK (CPK): CPT | Performed by: STUDENT IN AN ORGANIZED HEALTH CARE EDUCATION/TRAINING PROGRAM

## 2020-02-13 PROCEDURE — 36556 INSERT NON-TUNNEL CV CATH: CPT | Performed by: INTERNAL MEDICINE

## 2020-02-13 PROCEDURE — 40000275 ZZH STATISTIC RCP TIME EA 10 MIN

## 2020-02-13 PROCEDURE — 82805 BLOOD GASES W/O2 SATURATION: CPT

## 2020-02-13 PROCEDURE — 85025 COMPLETE CBC W/AUTO DIFF WBC: CPT | Performed by: STUDENT IN AN ORGANIZED HEALTH CARE EDUCATION/TRAINING PROGRAM

## 2020-02-13 PROCEDURE — 00000146 ZZHCL STATISTIC GLUCOSE BY METER IP

## 2020-02-13 PROCEDURE — 4A1239Z MONITORING OF CARDIAC OUTPUT, PERCUTANEOUS APPROACH: ICD-10-PCS | Performed by: INTERNAL MEDICINE

## 2020-02-13 PROCEDURE — 80200 ASSAY OF TOBRAMYCIN: CPT | Performed by: INTERNAL MEDICINE

## 2020-02-13 PROCEDURE — 84484 ASSAY OF TROPONIN QUANT: CPT | Performed by: STUDENT IN AN ORGANIZED HEALTH CARE EDUCATION/TRAINING PROGRAM

## 2020-02-13 PROCEDURE — 85610 PROTHROMBIN TIME: CPT | Performed by: STUDENT IN AN ORGANIZED HEALTH CARE EDUCATION/TRAINING PROGRAM

## 2020-02-13 PROCEDURE — 87086 URINE CULTURE/COLONY COUNT: CPT | Performed by: INTERNAL MEDICINE

## 2020-02-13 PROCEDURE — 80076 HEPATIC FUNCTION PANEL: CPT | Performed by: STUDENT IN AN ORGANIZED HEALTH CARE EDUCATION/TRAINING PROGRAM

## 2020-02-13 PROCEDURE — 87800 DETECT AGNT MULT DNA DIREC: CPT | Performed by: STUDENT IN AN ORGANIZED HEALTH CARE EDUCATION/TRAINING PROGRAM

## 2020-02-13 PROCEDURE — 99291 CRITICAL CARE FIRST HOUR: CPT | Mod: 25 | Performed by: INTERNAL MEDICINE

## 2020-02-13 PROCEDURE — 40000048 ZZH STATISTIC DAILY SWAN MONITORING

## 2020-02-13 PROCEDURE — 25000125 ZZHC RX 250: Performed by: STUDENT IN AN ORGANIZED HEALTH CARE EDUCATION/TRAINING PROGRAM

## 2020-02-13 PROCEDURE — 93503 INSERT/PLACE HEART CATHETER: CPT | Mod: 59 | Performed by: INTERNAL MEDICINE

## 2020-02-13 PROCEDURE — 20000004 ZZH R&B ICU UMMC

## 2020-02-13 PROCEDURE — 85027 COMPLETE CBC AUTOMATED: CPT | Performed by: STUDENT IN AN ORGANIZED HEALTH CARE EDUCATION/TRAINING PROGRAM

## 2020-02-13 PROCEDURE — 33968 REMOVE AORTIC ASSIST DEVICE: CPT | Performed by: INTERNAL MEDICINE

## 2020-02-13 PROCEDURE — 87040 BLOOD CULTURE FOR BACTERIA: CPT | Performed by: STUDENT IN AN ORGANIZED HEALTH CARE EDUCATION/TRAINING PROGRAM

## 2020-02-13 PROCEDURE — 02HV33Z INSERTION OF INFUSION DEVICE INTO SUPERIOR VENA CAVA, PERCUTANEOUS APPROACH: ICD-10-PCS | Performed by: INTERNAL MEDICINE

## 2020-02-13 PROCEDURE — 25000132 ZZH RX MED GY IP 250 OP 250 PS 637: Mod: GY | Performed by: INTERNAL MEDICINE

## 2020-02-13 PROCEDURE — 82805 BLOOD GASES W/O2 SATURATION: CPT | Performed by: STUDENT IN AN ORGANIZED HEALTH CARE EDUCATION/TRAINING PROGRAM

## 2020-02-13 PROCEDURE — 87186 SC STD MICRODIL/AGAR DIL: CPT | Performed by: STUDENT IN AN ORGANIZED HEALTH CARE EDUCATION/TRAINING PROGRAM

## 2020-02-13 RX ORDER — ONDANSETRON 2 MG/ML
4 INJECTION INTRAMUSCULAR; INTRAVENOUS EVERY 6 HOURS PRN
Status: DISCONTINUED | OUTPATIENT
Start: 2020-02-13 | End: 2020-02-13

## 2020-02-13 RX ORDER — NOREPINEPHRINE BITARTRATE 0.06 MG/ML
.01-.4 INJECTION, SOLUTION INTRAVENOUS CONTINUOUS
Status: DISCONTINUED | OUTPATIENT
Start: 2020-02-13 | End: 2020-02-14

## 2020-02-13 RX ORDER — BUMETANIDE 0.25 MG/ML
4 INJECTION INTRAMUSCULAR; INTRAVENOUS ONCE
Status: DISCONTINUED | OUTPATIENT
Start: 2020-02-13 | End: 2020-02-13

## 2020-02-13 RX ORDER — MAGNESIUM SULFATE HEPTAHYDRATE 40 MG/ML
2 INJECTION, SOLUTION INTRAVENOUS EVERY 6 HOURS PRN
Status: DISCONTINUED | OUTPATIENT
Start: 2020-02-13 | End: 2020-02-16

## 2020-02-13 RX ORDER — FUROSEMIDE 10 MG/ML
80 INJECTION INTRAMUSCULAR; INTRAVENOUS ONCE
Status: COMPLETED | OUTPATIENT
Start: 2020-02-13 | End: 2020-02-13

## 2020-02-13 RX ORDER — ONDANSETRON 2 MG/ML
4 INJECTION INTRAMUSCULAR; INTRAVENOUS ONCE
Status: COMPLETED | OUTPATIENT
Start: 2020-02-13 | End: 2020-02-13

## 2020-02-13 RX ORDER — DOPAMINE HYDROCHLORIDE 160 MG/100ML
INJECTION, SOLUTION INTRAVENOUS
Status: COMPLETED
Start: 2020-02-13 | End: 2020-02-13

## 2020-02-13 RX ORDER — DOPAMINE HYDROCHLORIDE 160 MG/100ML
1-20 INJECTION, SOLUTION INTRAVENOUS CONTINUOUS
Status: ACTIVE | OUTPATIENT
Start: 2020-02-13 | End: 2020-02-13

## 2020-02-13 RX ORDER — POTASSIUM CHLORIDE 29.8 MG/ML
20 INJECTION INTRAVENOUS EVERY 6 HOURS PRN
Status: DISCONTINUED | OUTPATIENT
Start: 2020-02-13 | End: 2020-02-16

## 2020-02-13 RX ORDER — DOPAMINE HYDROCHLORIDE 320 MG/100ML
2.5 INJECTION, SOLUTION INTRAVENOUS CONTINUOUS
Status: DISCONTINUED | OUTPATIENT
Start: 2020-02-13 | End: 2020-02-17

## 2020-02-13 RX ORDER — BUMETANIDE 0.25 MG/ML
4 INJECTION INTRAMUSCULAR; INTRAVENOUS ONCE
Status: DISCONTINUED | OUTPATIENT
Start: 2020-02-13 | End: 2020-02-14 | Stop reason: CLARIF

## 2020-02-13 RX ORDER — PIPERACILLIN SODIUM, TAZOBACTAM SODIUM 4; .5 G/20ML; G/20ML
4.5 INJECTION, POWDER, LYOPHILIZED, FOR SOLUTION INTRAVENOUS EVERY 6 HOURS
Status: DISCONTINUED | OUTPATIENT
Start: 2020-02-13 | End: 2020-02-14

## 2020-02-13 RX ORDER — BUMETANIDE 0.25 MG/ML
2 INJECTION INTRAMUSCULAR; INTRAVENOUS 2 TIMES DAILY
Status: DISCONTINUED | OUTPATIENT
Start: 2020-02-13 | End: 2020-02-13

## 2020-02-13 RX ORDER — BUMETANIDE 0.25 MG/ML
6 INJECTION INTRAMUSCULAR; INTRAVENOUS ONCE
Status: COMPLETED | OUTPATIENT
Start: 2020-02-13 | End: 2020-02-13

## 2020-02-13 RX ORDER — BUMETANIDE 0.25 MG/ML
4 INJECTION INTRAMUSCULAR; INTRAVENOUS
Status: DISCONTINUED | OUTPATIENT
Start: 2020-02-13 | End: 2020-02-13

## 2020-02-13 RX ORDER — CAPTOPRIL 12.5 MG/1
12.5 TABLET ORAL ONCE
Status: DISCONTINUED | OUTPATIENT
Start: 2020-02-13 | End: 2020-02-13

## 2020-02-13 RX ADMIN — POTASSIUM CHLORIDE 40 MEQ: 750 TABLET, EXTENDED RELEASE ORAL at 01:55

## 2020-02-13 RX ADMIN — AMIODARONE HYDROCHLORIDE 400 MG: 200 TABLET ORAL at 21:15

## 2020-02-13 RX ADMIN — BUMETANIDE 2 MG: 0.25 INJECTION INTRAMUSCULAR; INTRAVENOUS at 05:29

## 2020-02-13 RX ADMIN — ONDANSETRON 4 MG: 2 INJECTION INTRAMUSCULAR; INTRAVENOUS at 16:31

## 2020-02-13 RX ADMIN — FENTANYL CITRATE 25 MCG: 50 INJECTION, SOLUTION INTRAMUSCULAR; INTRAVENOUS at 23:34

## 2020-02-13 RX ADMIN — DOPAMINE HYDROCHLORIDE 5 MCG/KG/MIN: 160 INJECTION, SOLUTION INTRAVENOUS at 05:28

## 2020-02-13 RX ADMIN — CALCIUM GLUCONATE 1 G: 98 INJECTION, SOLUTION INTRAVENOUS at 04:03

## 2020-02-13 RX ADMIN — HEPARIN SODIUM 850 UNITS/HR: 10000 INJECTION, SOLUTION INTRAVENOUS at 12:14

## 2020-02-13 RX ADMIN — VASOPRESSIN 0.5 UNITS/HR: 20 INJECTION INTRAVENOUS at 14:28

## 2020-02-13 RX ADMIN — ATORVASTATIN CALCIUM 20 MG: 20 TABLET, FILM COATED ORAL at 21:15

## 2020-02-13 RX ADMIN — DOBUTAMINE 7.5 MCG/KG/MIN: 12.5 INJECTION, SOLUTION INTRAVENOUS at 18:21

## 2020-02-13 RX ADMIN — AMIODARONE HYDROCHLORIDE 400 MG: 200 TABLET ORAL at 07:52

## 2020-02-13 RX ADMIN — ASPIRIN 81 MG CHEWABLE TABLET 81 MG: 81 TABLET CHEWABLE at 07:53

## 2020-02-13 RX ADMIN — CALCIUM CHLORIDE, MAGNESIUM CHLORIDE, SODIUM CHLORIDE, SODIUM BICARBONATE, POTASSIUM CHLORIDE AND SODIUM PHOSPHATE DIBASIC DIHYDRATE 1.84 ML/KG/HR: 3.68; 3.05; 6.34; 3.09; .314; .187 INJECTION INTRAVENOUS at 22:14

## 2020-02-13 RX ADMIN — DOPAMINE HYDROCHLORIDE 5 MCG/KG/MIN: 320 INJECTION, SOLUTION INTRAVENOUS at 10:58

## 2020-02-13 RX ADMIN — PIPERACILLIN AND TAZOBACTAM 4.5 G: 4; .5 INJECTION, POWDER, FOR SOLUTION INTRAVENOUS at 21:13

## 2020-02-13 RX ADMIN — INSULIN GLARGINE 10 UNITS: 100 INJECTION, SOLUTION SUBCUTANEOUS at 23:34

## 2020-02-13 RX ADMIN — MAGNESIUM OXIDE 400 MG: 400 TABLET ORAL at 07:53

## 2020-02-13 RX ADMIN — PROCHLORPERAZINE EDISYLATE 5 MG: 5 INJECTION INTRAMUSCULAR; INTRAVENOUS at 21:14

## 2020-02-13 RX ADMIN — CHLOROTHIAZIDE SODIUM 1000 MG: 500 INJECTION, POWDER, LYOPHILIZED, FOR SOLUTION INTRAVENOUS at 22:41

## 2020-02-13 RX ADMIN — SODIUM THIOSULFATE 2 MCG/KG/MIN: 250 INJECTION, SOLUTION INTRAVENOUS at 02:51

## 2020-02-13 RX ADMIN — ONDANSETRON 4 MG: 2 INJECTION INTRAMUSCULAR; INTRAVENOUS at 02:38

## 2020-02-13 RX ADMIN — PIPERACILLIN AND TAZOBACTAM 4.5 G: 4; .5 INJECTION, POWDER, FOR SOLUTION INTRAVENOUS at 04:07

## 2020-02-13 RX ADMIN — CALCIUM CHLORIDE, MAGNESIUM CHLORIDE, SODIUM CHLORIDE, SODIUM BICARBONATE, POTASSIUM CHLORIDE AND SODIUM PHOSPHATE DIBASIC DIHYDRATE 12.5 ML/KG/HR: 3.68; 3.05; 6.34; 3.09; .314; .187 INJECTION INTRAVENOUS at 22:14

## 2020-02-13 RX ADMIN — VASOPRESSIN 0.5 UNITS/HR: 20 INJECTION INTRAVENOUS at 18:21

## 2020-02-13 RX ADMIN — ALLOPURINOL 200 MG: 100 TABLET ORAL at 07:52

## 2020-02-13 RX ADMIN — MAGNESIUM SULFATE 2 G: 2 INJECTION INTRAVENOUS at 02:38

## 2020-02-13 RX ADMIN — BUMETANIDE 6 MG: 0.25 INJECTION INTRAMUSCULAR; INTRAVENOUS at 21:14

## 2020-02-13 RX ADMIN — FLUOXETINE 20 MG: 20 CAPSULE ORAL at 07:52

## 2020-02-13 RX ADMIN — PIPERACILLIN AND TAZOBACTAM 4.5 G: 4; .5 INJECTION, POWDER, FOR SOLUTION INTRAVENOUS at 16:32

## 2020-02-13 RX ADMIN — TOBRAMYCIN SULFATE 325 MG: 40 INJECTION, SOLUTION INTRAMUSCULAR; INTRAVENOUS at 20:27

## 2020-02-13 RX ADMIN — OMEPRAZOLE 20 MG: 20 CAPSULE, DELAYED RELEASE ORAL at 07:52

## 2020-02-13 RX ADMIN — ACETAMINOPHEN 650 MG: 325 TABLET, FILM COATED ORAL at 01:55

## 2020-02-13 RX ADMIN — THIAMINE HCL TAB 100 MG 100 MG: 100 TAB at 07:52

## 2020-02-13 RX ADMIN — DOPAMINE HYDROCHLORIDE 3 MCG/KG/MIN: 320 INJECTION, SOLUTION INTRAVENOUS at 18:21

## 2020-02-13 RX ADMIN — DOBUTAMINE 7.5 MCG/KG/MIN: 12.5 INJECTION, SOLUTION INTRAVENOUS at 16:23

## 2020-02-13 RX ADMIN — BUMETANIDE 2 MG: 0.25 INJECTION INTRAMUSCULAR; INTRAVENOUS at 07:51

## 2020-02-13 RX ADMIN — CHLOROTHIAZIDE SODIUM 500 MG: 500 INJECTION, POWDER, LYOPHILIZED, FOR SOLUTION INTRAVENOUS at 10:56

## 2020-02-13 RX ADMIN — FUROSEMIDE 80 MG: 10 INJECTION, SOLUTION INTRAVENOUS at 11:40

## 2020-02-13 RX ADMIN — VANCOMYCIN HYDROCHLORIDE 2500 MG: 1 INJECTION, POWDER, LYOPHILIZED, FOR SOLUTION INTRAVENOUS at 05:07

## 2020-02-13 RX ADMIN — PIPERACILLIN AND TAZOBACTAM 4.5 G: 4; .5 INJECTION, POWDER, FOR SOLUTION INTRAVENOUS at 09:10

## 2020-02-13 RX ADMIN — Medication 200 MG: at 07:54

## 2020-02-13 RX ADMIN — ACETAMINOPHEN 650 MG: 325 TABLET, FILM COATED ORAL at 16:32

## 2020-02-13 RX ADMIN — Medication 0.03 MCG/KG/MIN: at 05:08

## 2020-02-13 ASSESSMENT — MIFFLIN-ST. JEOR: SCORE: 1773.63

## 2020-02-13 ASSESSMENT — ACTIVITIES OF DAILY LIVING (ADL)
ADLS_ACUITY_SCORE: 16
ADLS_ACUITY_SCORE: 14
ADLS_ACUITY_SCORE: 18

## 2020-02-13 NOTE — PROGRESS NOTES
Pre- LVAD/Transplant Social Work Services Progress Note      Date of Initial Social Work Evaluation: 02/10/2020  Collaborated with: Pt at bedside    Data: Pt previously completed advanced therapy evaluation, though LVAD was only indication at that time. Writer to follow up and provide further support as heart transplant has since been presented as option.  Intervention: Supportive visit  Assessment: Pt remains overwhelmed with evaluation process, particularly with recent heart transplant discussion. Writer briefly reviewed mckeon differences, and explained secondary caregiver requirement for transplant vs. LVAD (no need for 2nd caregiver to complete education/relocate). At this time, Pt open to either advanced therapy and realizes intervention is needed sooner than he anticipated. Pt reports family will support his decision either way. Pt appears to be coping appropriately, denies increased anxiety/depression. Pt interested in meeting previous LVAD/heart transplant pt - Writer to coordinate visits.    Pt's wife returned home on Tuesday, but has been in contact with transplant coordinator. Pt also passed along LVAD/Transplant SW's contact information. Pt and wife deny questions at this time. Pt's daughter (Mayra) plans to visit from [place] for the weekend. Pt inquired about local accomodations for her - Writer to follow up with daughter.     From psychsocial perspective, Pt continues to be appropriate candidate to proceed with either LVAD or heart transplant.   Education provided by SW: Local resources, transplant caregiver plan, ongoing SW support.  Plan:    Discharge Plans in Progress: None     Barriers to d/c plan: Medical readiness    Follow up Plan: SW will continue to follow

## 2020-02-13 NOTE — PLAN OF CARE
Major Shift Events:  Dopamine gtt dc'd. Capoten dose increased, Nipride gtt weaned to 0.5. Transplant coordinator spoke with patient regarding LVAD vs transplant.   Plan: Wean Nipride as able     For vital signs and complete assessments, please see documentation flowsheets.       Problem: Diabetes Comorbidity  Goal: Blood Glucose Level Within Desired Range  Outcome: No Change     Problem: Heart Failure Comorbidity  Goal: Maintenance of Heart Failure Symptom Control  Outcome: No Change     Problem: Obstructive Sleep Apnea Risk or Actual (Comorbidity Management)  Goal: Unobstructed Breathing During Sleep  Outcome: No Change     Problem: Cardiac Output Decreased  Goal: Effective Cardiac Output  Outcome: No Change     Problem: Cardiac Disease Comorbidity  Goal: Cardiac Disease  Description  Patient comorbidity will be monitored for signs and symptoms of Cardiac Disease.  Problems will be absent, minimized or managed by discharge/transition of care.  Outcome: Declining

## 2020-02-13 NOTE — CONSULTS
[facility]  General Infectious Disease Initial Consult Note     Patient:  Eliseo Tanner, Date of birth 1955, Medical record number [id number]  Date of Visit:  February 13, 2020  Consult Requested by: Phil  Reason for Consult: Worsening hemodynamics, ? infection         Assessment and Recommendations:   Problem List:  Rigors on 2/13 am -no blood cultures growing gram negative rods  Vfib arrest  Chronic systolic heart failure with exacerbation  NICM  Type 2 diabetes  CKD 3    Impression:   Mr Eliseo Tanner is a 63yo M with a history of chronic systolic heart failure, NICM, moderate CAD, HTN, DM2, CKDIII who was admitted to [facility] on 2/7/20 for evaluation of heart failure. Prior to arrival to the floor he was in Vfib and required shocks x 3. He was being diuresed as expected, and has been requiring inotropes and a balloon pump. This morning had rigors and chills. Blood cultures now growing gram negative rods. I suspect this is most likely from his lines. Would continue broad spectrum antibiotics for now. His lines will need to be removed and exchanged. If this is not possible, would consider changing his lines over a wire. Please obtain repeat blood cultures tomorrow morning. Would obtain Cdiff testing given his diarrhea.     Recommendations:  1. Agree with Vanc/Zosyn for now  2. Await pending cultures  3. Please repeat blood cultures tomorrow morning  4. Remove lines if possible -I have spoken to the team in person regarding this  5. Please send Cdiff PCR    ID will follow.         Attestation:  I have reviewed today's vital signs, medications, labs and imaging.  Marie Siddiqui MD  Pager [phone]         History of Present Illness:       Mr Eliseo Tanner is a 63yo M with a history of chronic systolic heart failure, NICM, moderate CAD, HTN, DM2, CKDIII who was admitted to [facility] on 2/7/20 for evaluation of heart failure. Prior to arrival to the floor he was in Vfib and  required shocks x 3.     Since admission he has remained critically ill, requiring a PA catheter for monitoring, balloon pump, and 2 inotropic medications. He is being evaluated for heart transplant and LVAD currently.     This morning (2/13) he was noted to decompensate with rigors, shaking, and appeared cyanotic. Cultures were taken and broad spectrum antibiotics were started.     Currently, the patient denies pain or discomfort. He feels that his breathing is comfortable. He had some diarrhea yesterday. He denies any pain around his IVs.            Review of Systems:   CONSTITUTIONAL:  No fevers or chills  EYES: negative for icterus  ENT:  negative for oral lesions, hearing loss, tinnitus and sore throat  RESPIRATORY:  negative for cough and dyspnea  CARDIOVASCULAR:  negative for chest pain, palpitations  GASTROINTESTINAL:  negative for nausea, vomiting, and constipation  GENITOURINARY:  negative for dysuria  HEME:  No easy bruising/bleeding  INTEGUMENT:  negative for rash and pruritus  NEURO:  Negative for headache       Past Medical History:   No past medical history on file.   Type 2 diabetes  CKD  Non ischemic cardiomyopathy  CAD  Hypertension  ABHINAV        Allergies:      Allergies   Allergen Reactions     Oxycodone Itching and Other (See Comments)            Current Antimicrobials:     Vanc/Zosyn d1=2/13       Family History:   No family history on file.  No family history of recurrent infections.       Social History:     Social History     Socioeconomic History     Marital status:      Spouse name: Not on file     Number of children: Not on file     Years of education: Not on file     Highest education level: Not on file   Occupational History     Not on file   Social Needs     Financial resource strain: Not on file     Food insecurity:     Worry: Not on file     Inability: Not on file     Transportation needs:     Medical: Not on file     Non-medical: Not on file   Tobacco Use     Smoking status:  Not on file   Substance and Sexual Activity     Alcohol use: Not on file     Drug use: Not on file     Sexual activity: Not on file   Lifestyle     Physical activity:     Days per week: Not on file     Minutes per session: Not on file     Stress: Not on file   Relationships     Social connections:     Talks on phone: Not on file     Gets together: Not on file     Attends L[employer] service: Not on file     Active member of club or organization: Not on file     Attends meetings of clubs or organizations: Not on file     Relationship status: Not on file     Intimate partner violence:     Fear of current or ex partner: Not on file     Emotionally abused: Not on file     Physically abused: Not on file     Forced sexual activity: Not on file   Other Topics Concern     Not on file   Social History Narrative     Not on file              Physical Exam:   Ranges forvital signs:  Temp:  [97.7  F (36.5  C)-101.5  F (38.6  C)] 98.4  F (36.9  C)  Heart Rate:  [] 104  Resp:  [16-32] 19  MAP:  [57 mmHg-108 mmHg] 72 mmHg  Arterial Line BP: ()/(38-81) 98/52  SpO2:  [88 %-99 %] 92 %    Intake/Output Summary (Last 24 hours) at 2/13/2020 1337  Last data filed at 2/13/2020 1300  Gross per 24 hour   Intake 2634.27 ml   Output 944 ml   Net 1690.27 ml       Exam:  GENERAL:  well-developed, well-nourished, in no acute distress.   ENT:  Head is normocephalic, atraumatic. Oropharynx is moist without exudates or ulcers.  EYES:  Eyes have anicteric sclerae.  PERRL.  NECK:  Supple. No cervical lymphadenopathy.   LUNGS:  Clear to auscultation bilaterally. No wheezes or crackles  CARDIOVASCULAR:  Regular rate and rhythm with no murmurs, gallops or rubs.  ABDOMEN:  Normal bowel sounds, soft, some tenderness to palpation over R lower quadrant. No hepatosplenomegaly.   EXT: Extremities warm and without edema.  SKIN:  No acute rashes.    NEUROLOGIC:  Grossly nonfocal.    ACCESS: L internal jugular, R CVC -no surrounding erythema        Laboratory Data:     Creatinine   Date Value Ref Range Status   2020 2.09 (H) 0.66 - 1.25 mg/dL Final   2020 1.72 (H) 0.66 - 1.25 mg/dL Final   2020 1.58 (H) 0.66 - 1.25 mg/dL Final   2020 1.23 0.66 - 1.25 mg/dL Final   2020 1.35 (H) 0.66 - 1.25 mg/dL Final     WBC   Date Value Ref Range Status   2020 15.9 (H) 4.0 - 11.0 10e9/L Final   2020 1.9 (L) 4.0 - 11.0 10e9/L Final   2020 4.8 4.0 - 11.0 10e9/L Final   2020 6.0 4.0 - 11.0 10e9/L Final   2020 6.0 4.0 - 11.0 10e9/L Final     Hemoglobin   Date Value Ref Range Status   2020 14.5 13.3 - 17.7 g/dL Final     Platelet Count   Date Value Ref Range Status   2020 88 (L) 150 - 450 10e9/L Final     Lab Results   Component Value Date     (L) 2020    BUN 38 (H) 2020    CO2 22 2020       Culture data:   Blood cultures GNRs   UA WBC 81, small LE, negative nitrites    All cultures:  Recent Labs   Lab 20  0353 20  0350 20  0331   CULT No growth after 2 hours No growth after 3 hours PENDING            Imagin/13 CXR  1. Stable cardiomegaly with mild pulmonary edema.  2. Stable layering pleural effusion on the right.    2/10 CT Dental: No periapical abscess     CT Chest/Abdomen/Pelvis  1. Moderate right and small left pleural effusions, with overlying  atelectasis/consolidation.  2. Mild tree-in-bud nodularity within the lateral right upper lobe,  concerning for infection. Findings may represent respiratory  bronchiolitis.  3. No acute pathology within the abdomen or pelvis. Mild  intra-abdominal ascites and other signs of third spacing of fluid.  4. The inferior aspect of the intra-aortic balloon pump extends below  the level of the renal artery origins.

## 2020-02-13 NOTE — PROGRESS NOTES
Brief Note:    Mr. Tanner rather acutely decompensated early this morning with rigors, shaking, and cyanotic appearance. He maintained mentation and ability to protect airway. He was tolerating afterload reduction with Nipride quite well over the previous 36 hours.    Given his hemodynamics with worsening filling pressures, worsening cardiac index, and increasing SVR, we opted to advance his inotropic support by augmenting dobutamine and adding dopamine at a rate which would help with beta-stimulation. Without a great explanation for his acute change, we also opted to check broad cultures and initiate broad spectrum antibiotics.    His last set of hemodynamics suggest we are moving in the right direction. I think diuresis remains important despite possible sepsis, given CVPs in the mid 20's, hence the administration of bumetanide. He has very little reserve for any acute insult. He remains a candidate for Impella CP upgrade or even [facility] ecmo if needed, but given stabilization and improvement into the late evening these options were not necessary.    Robbie Lewis  Cardiology Fellow

## 2020-02-13 NOTE — PLAN OF CARE
Assessment:   Cardiac: -120. MAP goals 70-75. Nipride titrated off at 0415 and Levophed gtt started shortly after. Dobutamine increased at 0520 and Dopamine started at 0530 for CI 1.1. Levophed gtt able to be weaned off at 0600.  IABP 1:1.   Resp:  RR 20-30 with home CPAP 3 L bleed of O2.   Neuro:  Sleepy/drowsy, but easy to arouse.   :  2 mg of Bumex given at 0530 for low UO and increasing CVP.   GI:  Emesis x 2 bile, zofran given at 0238 with relief.   Skin: No concerns.   Endocrine: No sliding scale insulin needed.      Interventions:  Dr. Lewis called to bedside for acute decompensation at 0200 for chills, skin cyanotic, MAPs 120s, HR 120s, and RR 30, T 99.5. CBC, BMP, iCa, Lactate, Mg, Phos, LFTs, troponin, CK, ABG, VBG drawn, and 12 lead done. Lactate resulted 9.5. UA, BC x 2, and r/o influenza sent, started on zosyn and vancomycin. Continued to trend lactate and in constant contact with Dr. Lewis. 0510 levophed gtt started and Dr. Lewis called to bedside. Dobutamine gtt increased and Dopamine gtt started, levophed was able to be weaned off.     Plan: Will continue to monitor/assess and update MD as needed. Plan to get VBG and lactate at 0645.

## 2020-02-13 NOTE — PHARMACY-VANCOMYCIN DOSING SERVICE
Pharmacy Vancomycin Initial Note  Date of Service 2020  Patient's  1955  64 year old, male    Indication: Sepsis    Current estimated CrCl = Estimated Creatinine Clearance: 52 mL/min (A) (based on SCr of 1.58 mg/dL (H)).    Creatinine for last 3 days  2/10/2020:  3:52 PM Creatinine 1.34 mg/dL  2020:  4:24 AM Creatinine 1.35 mg/dL;  2:47 PM Creatinine 1.15 mg/dL  2020:  4:40 AM Creatinine 1.35 mg/dL;  4:01 PM Creatinine 1.23 mg/dL  2020:  2:06 AM Creatinine 1.58 mg/dL    Recent Vancomycin Level(s) for last 3 days  No results found for requested labs within last 72 hours.      Vancomycin IV Administrations (past 72 hours)      No vancomycin orders with administrations in past 72 hours.                Nephrotoxins and other renal medications (From now, onward)    Start     Dose/Rate Route Frequency Ordered Stop    20 0600  captopril (CAPOTEN) tablet 37.5 mg      37.5 mg Oral EVERY 8 HOURS SCHEDULED 20 0127      20 0330  piperacillin-tazobactam (ZOSYN) 4.5 g vial to attach to  mL bag      4.5 g  over 30 Minutes Intravenous EVERY 6 HOURS 20 0301      20 0130  captopril (CAPOTEN) tablet 12.5 mg      12.5 mg Oral ONCE 20 0127      20 0130  bumetanide (BUMEX) injection 2 mg      2 mg Intravenous 2 TIMES DAILY 20 1800  DOBUTamine (DOBUTREX) 1,000 mg in D5W 250 mL infusion (adult max conc)      2.5-7.5 mcg/kg/min × 108.9 kg (Dosing Weight)  4.1-12.3 mL/hr  Intravenous CONTINUOUS 20 1757            Contrast Orders - past 72 hours (72h ago, onward)    None                Plan:  1.  Start vancomycin  2500 mg IV once followed by 2000 mg IV q24h.  2.  Goal Trough Level: 15-20 mg/L   3.  Pharmacy will check trough levels as appropriate in 1-3 Days.    4. Serum creatinine levels will be ordered daily for the first week of therapy and at least twice weekly for subsequent weeks.    5. Capulin method utilized to dose  vancomycin therapy: Method 2.    Jonas Leos, [facility]

## 2020-02-13 NOTE — PROGRESS NOTES
Cardiology Progress Note  Eliseo Tanner MRN: [id number]  Age: 64 year old, : 1955  Date: 2020      Critical Care  Gucci Tomas    I personally saw and examined this patient and agree with the findings, assessment, and plan as outlined by the housestaff this day.  The patient remains in the ICU secondary to advanced circulatory disease characterized by shock, the need for parenteral  inotropic agents and/or vasopressors to maintain blood pressure and organ perfusion., the careful assessment of renal function in response to low cardiac output state and need for substantial volumes of fluid for drug administration.  I personally assessed.  the logic and continuity of care plan over the preceding twenty four hours, assessed the continuing need for parenteral and oral medications and their appropriate dosing in the context of circulatory status and renal function.  I reviewed all interim laboratory and imaging.  I coordinated care with nursing staff and consultants. I disucssed the patient's status with the patient and or family.    The patient remians tenuous with renal failure, advanced cardiomyopathy, shock.  45 minutes        Assessment and Plan:     Mr Eliseo Tanner is a 65yo M w/PMHx notable for chronic systolic heart failure, NICM, moderate CAD, HTN, ABHINAV on CPAP, DM2, CKDIII who is transferred to [facility] for evaluation and management of advanced heart failure with arrhythmia.     Today's plan ():  -Empiric abx  -Trend hemodynamics  -Try and wean Dopamine for MAPs 65-75  -Call Dental to evaluate    HDs for  AM:  CVP 18  PA 46/32/37  PCWP 26  PA sat 48%  Jane CO/CI 3.3/1.5  SVR 1590  PVR 1.7  Hgb 13.3    Moderate CAD  Severe Mitral regurgitation  Chronic nonischemic systolic heart failure w/ reduced EF (15-20%) and exacerbation  NYHA Class III. Echo 2020: LVEF 15-20%; LVEDd 5.9 cm; 4+ MR. [facility] 2019 w/ nonobstructive CAD w/ severe branch disease. Presented  with increased wt gain, SOB, LE edema concerning for HF exacerbation, initially concerning for cardiogenic shock. Admitted to [facility] for further evaluation regarding need for advanced HF therapies.   -Financial approval obtained for OHT/LVAD w/u; order set has been placed  -[place] placed: hemodynamics w/ Jane CO/CI q6h  -Telemetry  -Lactic acid, VBG ordered; trend  -Beta-blocker: None due to decompensated HF  -ACEi/ARB/ARNI: Holding home Entresto  mg BID  -Aldosterone antagonist: Holding spironolactone until renal assessed   -Volume status: Hypervolemic on exam. Weight on admit 240. Baseline weight 225-230.  -Diuretic regimen: for 2/13: Bumex 4 mg IV BID  -Continue dobutmaine 7.5, Dopamine 5  -Afterload reduction: holding in setting of being on dopmaine  -Continue ASA 81, atorvastatin 20 mg    BERNARDA on CKDIII  Cr on admission 1.7, baseline Cr 1.5-2. Goal to improve renal function with diuresis in order to make best candidate for potential LVAD.  -Trend Cr  -Daily fluid balance  -Diuresis as above    Precapillary pulmonary hypertension:  Significant transpulmonary gradient of 14 on right heart cath numbers. May be related to CTEPH vs. WHO I.  -Empiric heparin  -V/Q ordered on 2/6, but not done yet    #Thrombocytopenia:  Concern for HIT given timing with respect to heparin exposure. Was briefly on argatroban, but back on heparin given negative HIT screen.  -Heparin ggt for IABP    VFib requiring shock  Shocked x 3 prior to transfer, loaded with amio. No known prior history. Suspect related to underlying HF.   -Keep K>4, Mg>2  -Amio 400 mg PO BID  -Need ICD for secondary prevention     Diabetes Mellitus Type II  Last A1c 7.2% 2/6/20  Home regimen includes: 15U basaglar at bedtime, glipizide 2.5  -Will resume home glargine at 30% reduction, 10U QHS  -Holding home oral regimen while renal function and/or hemodynamic status is either impaired or threatened  -Preprandial blood glucose target <140 mg/dL and random  "<180mg/dl, or 140-180 mg/dL for critically ill  -SSI insulin, q4h blood sugar checks, hypoglycemia protocol ordered     Chronic:  ABHINAV: Home CPAP  Gout: PTA allopurinol 200 daily  MDD: PTA fluoxetine  GERD: PTA PPI  COPD: albuterol PRN    FEN:  2gm Na   2L water restriction    PPX: Heparin ggt    CODE: FULL CODE     Patient was discussed with staff attending, Dr. Tomas, who agrees with the above assessment and plan.    Stanford Acuna MD  Cardiology Fellow, PGY-5  Pager: [phone]            Subjective/Interval Events     Overnight MAPs and CI dropped, patient had rigors. Infectious work-up ordered. Started on empiric antibiotics. Captopril stopped and started on dopamine.    This AM, patient reports no fevers, chills, SOB, dysuria, abdominal pain, diarrhea, headache.    No CP, lightheadedness, palpitations.          Objective     BP (!) 86/58   Pulse 102   Temp 97.7  F (36.5  C) (Oral)   Resp 24   Ht 1.702 m (5' 7\")   Wt 103.5 kg (228 lb 2.8 oz)   SpO2 97%   BMI 35.74 kg/m    Temp:  [97.7  F (36.5  C)-101.5  F (38.6  C)] 97.7  F (36.5  C)  Heart Rate:  [] 120  Resp:  [16-32] 24  MAP:  [57 mmHg-108 mmHg] 90 mmHg  Arterial Line BP: ()/(42-81) 114/72  SpO2:  [88 %-99 %] 97 %  Wt Readings from Last 2 Encounters:   02/13/20 103.5 kg (228 lb 2.8 oz)     I/O last 3 completed shifts:  In: 2426.37 [P.O.:820; I.V.:1606.37]  Out: 2045 [Urine:2045]      Gen: No acute distress  HEENT: NC/AT, PERRL, EOM intact, MMM, OP without exudates  PULM/THORAX: Clear to auscultation bilaterally, no rales/rhonchi/wheezes  CV: normal rate, regular rhythm, normal S1 and S2, no murmurs or rubs.  ABD: Soft, NTND, bowel sounds present, no masses  EXT: WWP. No LE edema, clubbing or cyanosis.  NEURO: CN II-XII grossly intact. A&Ox3    Lines:  -L IJ PA catheter  -Radial arterial line  -R femoral arterial IABP            Data:     Recent Results (from the past 24 hour(s))   Glucose by meter    Collection Time: 02/12/20 12:01 PM "   Result Value Ref Range    Glucose 139 (H) 70 - 99 mg/dL   Glucose by meter    Collection Time: 02/12/20  3:59 PM   Result Value Ref Range    Glucose 135 (H) 70 - 99 mg/dL   Blood gas venous with oxyhemoglobin (PCU Collect TBD)    Collection Time: 02/12/20  4:00 PM   Result Value Ref Range    Ph Venous 7.51 (H) 7.32 - 7.43 pH    PCO2 Venous 45 40 - 50 mm Hg    PO2 Venous 32 25 - 47 mm Hg    Bicarbonate Venous 36 (H) 21 - 28 mmol/L    FIO2 21     Oxyhemoglobin Venous 61 %    Base Excess Venous 11.7 mmol/L   Basic metabolic panel    Collection Time: 02/12/20  4:01 PM   Result Value Ref Range    Sodium 135 133 - 144 mmol/L    Potassium 3.5 3.4 - 5.3 mmol/L    Chloride 95 94 - 109 mmol/L    Carbon Dioxide 36 (H) 20 - 32 mmol/L    Anion Gap 4 3 - 14 mmol/L    Glucose 136 (H) 70 - 99 mg/dL    Urea Nitrogen 31 (H) 7 - 30 mg/dL    Creatinine 1.23 0.66 - 1.25 mg/dL    GFR Estimate 61 >60 mL/min/[1.73_m2]    GFR Estimate If Black 70 >60 mL/min/[1.73_m2]    Calcium 8.7 8.5 - 10.1 mg/dL   CBC with platelets    Collection Time: 02/12/20  4:01 PM   Result Value Ref Range    WBC 4.8 4.0 - 11.0 10e9/L    RBC Count 4.41 4.4 - 5.9 10e12/L    Hemoglobin 12.4 (L) 13.3 - 17.7 g/dL    Hematocrit 37.8 (L) 40.0 - 53.0 %    MCV 86 78 - 100 fl    MCH 28.1 26.5 - 33.0 pg    MCHC 32.8 31.5 - 36.5 g/dL    RDW 18.2 (H) 10.0 - 15.0 %    Platelet Count 100 (L) 150 - 450 10e9/L   Glucose by meter    Collection Time: 02/12/20  9:27 PM   Result Value Ref Range    Glucose 158 (H) 70 - 99 mg/dL   Heparin Xa (10a) Level    Collection Time: 02/12/20  9:40 PM   Result Value Ref Range    Heparin 10A Level 0.36 IU/mL   Blood gas venous with oxyhemoglobin (PCU Collect TBD)    Collection Time: 02/13/20 12:03 AM   Result Value Ref Range    Ph Venous 7.51 (H) 7.32 - 7.43 pH    PCO2 Venous 45 40 - 50 mm Hg    PO2 Venous 33 25 - 47 mm Hg    Bicarbonate Venous 36 (H) 21 - 28 mmol/L    FIO2 3L     Oxyhemoglobin Venous 63 %    Base Excess Venous 11.4 mmol/L    Potassium whole blood    Collection Time: 02/13/20 12:03 AM   Result Value Ref Range    Potassium 3.8 3.4 - 5.3 mmol/L   Heparin Xa (10a) Level    Collection Time: 02/13/20  2:06 AM   Result Value Ref Range    Heparin 10A Level 0.51 IU/mL   Potassium whole blood (PCU Collect TBD)    Collection Time: 02/13/20  2:06 AM   Result Value Ref Range    Potassium 4.2 3.4 - 5.3 mmol/L   Blood gas arterial with oxyhemoglobin (PCU Collect TBD)    Collection Time: 02/13/20  2:06 AM   Result Value Ref Range    pH Arterial 7.41 7.35 - 7.45 pH    pCO2 Arterial 33 (L) 35 - 45 mm Hg    pO2 Arterial 124 (H) 80 - 105 mm Hg    Bicarbonate Arterial 21 21 - 28 mmol/L    FIO2 3.0L     Oxyhemoglobin Arterial 98 92 - 100 %    Base Deficit Art 3.0 mmol/L   CBC with platelets    Collection Time: 02/13/20  2:06 AM   Result Value Ref Range    WBC 1.9 (L) 4.0 - 11.0 10e9/L    RBC Count 4.80 4.4 - 5.9 10e12/L    Hemoglobin 13.3 13.3 - 17.7 g/dL    Hematocrit 42.2 40.0 - 53.0 %    MCV 88 78 - 100 fl    MCH 27.7 26.5 - 33.0 pg    MCHC 31.5 31.5 - 36.5 g/dL    RDW 18.6 (H) 10.0 - 15.0 %    Platelet Count 92 (L) 150 - 450 10e9/L   Magnesium    Collection Time: 02/13/20  2:06 AM   Result Value Ref Range    Magnesium 2.0 1.6 - 2.3 mg/dL   Phosphorus    Collection Time: 02/13/20  2:06 AM   Result Value Ref Range    Phosphorus 5.4 (H) 2.5 - 4.5 mg/dL   Calcium, ionized whole blood (PCU Collect TBD)    Collection Time: 02/13/20  2:06 AM   Result Value Ref Range    Calcium Ionized Whole Blood 4.3 (L) 4.4 - 5.2 mg/dL   Basic metabolic panel    Collection Time: 02/13/20  2:06 AM   Result Value Ref Range    Sodium 132 (L) 133 - 144 mmol/L    Potassium 4.1 3.4 - 5.3 mmol/L    Chloride 96 94 - 109 mmol/L    Carbon Dioxide 22 20 - 32 mmol/L    Anion Gap 14 3 - 14 mmol/L    Glucose 154 (H) 70 - 99 mg/dL    Urea Nitrogen 34 (H) 7 - 30 mg/dL    Creatinine 1.58 (H) 0.66 - 1.25 mg/dL    GFR Estimate 45 (L) >60 mL/min/[1.73_m2]    GFR Estimate If Black 52 (L) >60  mL/min/[1.73_m2]    Calcium 8.9 8.5 - 10.1 mg/dL   Lactic acid whole blood    Collection Time: 02/13/20  2:06 AM   Result Value Ref Range    Lactic Acid 9.5 (HH) 0.7 - 2.0 mmol/L   Hepatic panel    Collection Time: 02/13/20  2:06 AM   Result Value Ref Range    Bilirubin Direct 0.4 (H) 0.0 - 0.2 mg/dL    Bilirubin Total 0.8 0.2 - 1.3 mg/dL    Albumin 3.0 (L) 3.4 - 5.0 g/dL    Protein Total 7.4 6.8 - 8.8 g/dL    Alkaline Phosphatase 150 40 - 150 U/L    ALT 38 0 - 70 U/L    AST 40 0 - 45 U/L   CK total    Collection Time: 02/13/20  2:06 AM   Result Value Ref Range    CK Total 39 30 - 300 U/L   INR    Collection Time: 02/13/20  2:06 AM   Result Value Ref Range    INR 1.55 (H) 0.86 - 1.14   Partial thromboplastin time    Collection Time: 02/13/20  2:06 AM   Result Value Ref Range     (H) 22 - 37 sec   Troponin I    Collection Time: 02/13/20  2:06 AM   Result Value Ref Range    Troponin I ES 0.089 (H) 0.000 - 0.045 ug/L   Glucose by meter    Collection Time: 02/13/20  2:08 AM   Result Value Ref Range    Glucose 151 (H) 70 - 99 mg/dL   Blood gas venous with oxyhemoglobin (PCU Collect TBD)    Collection Time: 02/13/20  2:19 AM   Result Value Ref Range    Ph Venous 7.42 7.32 - 7.43 pH    PCO2 Venous 43 40 - 50 mm Hg    PO2 Venous 28 25 - 47 mm Hg    Bicarbonate Venous 28 21 - 28 mmol/L    FIO2 3LITERS     Oxyhemoglobin Venous 45 %    Base Excess Venous 2.6 mmol/L   Lactic acid whole blood    Collection Time: 02/13/20  2:19 AM   Result Value Ref Range    Lactic Acid 5.9 (HH) 0.7 - 2.0 mmol/L   EKG 12-lead, complete    Collection Time: 02/13/20  2:44 AM   Result Value Ref Range    Interpretation ECG Click View Image link to view waveform and result    Urine Culture Aerobic Bacterial    Collection Time: 02/13/20  3:31 AM   Result Value Ref Range    Specimen Description Catheterized Urine     Special Requests Specimen received in preservative     Culture Micro PENDING    UA with Microscopic reflex to Culture    Collection  Time: 02/13/20  3:34 AM   Result Value Ref Range    Color Urine Light Brown     Appearance Urine Cloudy     Glucose Urine 30 (A) NEG^Negative mg/dL    Bilirubin Urine Negative NEG^Negative    Ketones Urine Negative NEG^Negative mg/dL    Specific Gravity Urine 1.016 1.003 - 1.035    Blood Urine Large (A) NEG^Negative    pH Urine 6.0 5.0 - 7.0 pH    Protein Albumin Urine 300 (A) NEG^Negative mg/dL    Urobilinogen mg/dL Normal 0.0 - 2.0 mg/dL    Nitrite Urine Negative NEG^Negative    Leukocyte Esterase Urine Small (A) NEG^Negative    Source Catheterized Urine     WBC Urine 81 (H) 0 - 5 /HPF    RBC Urine >182 (H) 0 - 2 /HPF    Mucous Urine Present (A) NEG^Negative /LPF    Hyaline Casts 15 (H) 0 - 2 /LPF   Influenza A and B and RSV PCR    Collection Time: 02/13/20  3:34 AM   Result Value Ref Range    Specimen Description Nasopharyngeal     Influenza A PCR Negative NEG^Negative    Influenza B PCR Negative NEG^Negative    Resp Syncytial Virus Negative NEG^Negative   Blood gas venous with oxyhemoglobin (PCU Collect TBD)    Collection Time: 02/13/20  3:36 AM   Result Value Ref Range    Ph Venous 7.48 (H) 7.32 - 7.43 pH    PCO2 Venous 45 40 - 50 mm Hg    PO2 Venous 24 (L) 25 - 47 mm Hg    Bicarbonate Venous 34 (H) 21 - 28 mmol/L    FIO2 21     Oxyhemoglobin Venous 40 %    Base Excess Venous 8.8 mmol/L   Lactic acid whole blood    Collection Time: 02/13/20  3:36 AM   Result Value Ref Range    Lactic Acid 2.3 (H) 0.7 - 2.0 mmol/L   Blood culture    Collection Time: 02/13/20  3:50 AM   Result Value Ref Range    Specimen Description Blood Right Hand     Culture Micro No growth after 3 hours    Blood culture    Collection Time: 02/13/20  3:53 AM   Result Value Ref Range    Specimen Description Blood Left Hand     Culture Micro No growth after 2 hours    Blood gas venous with oxyhemoglobin (AM Draw)    Collection Time: 02/13/20  5:18 AM   Result Value Ref Range    Ph Venous 7.49 (H) 7.32 - 7.43 pH    PCO2 Venous 43 40 - 50 mm Hg     PO2 Venous 21 (L) 25 - 47 mm Hg    Bicarbonate Venous 33 (H) 21 - 28 mmol/L    FIO2 3L     Oxyhemoglobin Venous 30 %    Base Excess Venous 8.1 mmol/L   Lactic acid whole blood    Collection Time: 02/13/20  5:18 AM   Result Value Ref Range    Lactic Acid 3.4 (H) 0.7 - 2.0 mmol/L   Basic metabolic panel    Collection Time: 02/13/20  6:37 AM   Result Value Ref Range    Sodium 132 (L) 133 - 144 mmol/L    Potassium 4.0 3.4 - 5.3 mmol/L    Chloride 93 (L) 94 - 109 mmol/L    Carbon Dioxide 29 20 - 32 mmol/L    Anion Gap 10 3 - 14 mmol/L    Glucose 169 (H) 70 - 99 mg/dL    Urea Nitrogen 35 (H) 7 - 30 mg/dL    Creatinine 1.72 (H) 0.66 - 1.25 mg/dL    GFR Estimate 40 (L) >60 mL/min/[1.73_m2]    GFR Estimate If Black 47 (L) >60 mL/min/[1.73_m2]    Calcium 8.9 8.5 - 10.1 mg/dL   Blood gas venous with oxyhemoglobin (PCU Collect TBD)    Collection Time: 02/13/20  6:37 AM   Result Value Ref Range    Ph Venous 7.42 7.32 - 7.43 pH    PCO2 Venous 49 40 - 50 mm Hg    PO2 Venous 31 25 - 47 mm Hg    Bicarbonate Venous 32 (H) 21 - 28 mmol/L    FIO2 3l     Oxyhemoglobin Venous 53 %    Base Excess Venous 6.0 mmol/L   Magnesium    Collection Time: 02/13/20  6:37 AM   Result Value Ref Range    Magnesium 2.3 1.6 - 2.3 mg/dL   Lactic acid whole blood    Collection Time: 02/13/20  6:37 AM   Result Value Ref Range    Lactic Acid 2.2 (H) 0.7 - 2.0 mmol/L   Glucose by meter    Collection Time: 02/13/20  7:41 AM   Result Value Ref Range    Glucose 178 (H) 70 - 99 mg/dL   Blood gas venous with oxyhemoglobin (PCU Collect TBD)    Collection Time: 02/13/20  7:42 AM   Result Value Ref Range    Ph Venous 7.42 7.32 - 7.43 pH    PCO2 Venous 50 40 - 50 mm Hg    PO2 Venous 29 25 - 47 mm Hg    Bicarbonate Venous 32 (H) 21 - 28 mmol/L    FIO2 3LPM     Oxyhemoglobin Venous 48 %    Base Excess Venous 6.2 mmol/L   Lactic acid whole blood    Collection Time: 02/13/20  7:42 AM   Result Value Ref Range    Lactic Acid 2.0 0.7 - 2.0 mmol/L       Recent Results (from  the past 24 hour(s))   Echocardiogram Complete    Narrative    [id number]  [id number]  [id number]  [id number]^JIM           [facility]  Echocardiography Laboratory  [address] [place] [id number]     Name: WOLFGANG LEACH  MRN: [id number]  : 1955  Study Date: 2020 10:06 AM  Age: 64 yrs  Gender: Male  Patient Location: [facility]  Reason For Study: Heart Failure  Ordering Physician: NAHUN GUTIERREZ  Referring Physician: SELF, REFERRED  Performed By: Yvonne Adan RDCS     BSA: 2.2 m2  Height: 67 in  Weight: 244 lb  HR: 103  BP: 72/52 mmHg  _____________________________________________________________________________  __        Procedure  Complete Portable Echo Adult. Contrast Optison. Optison (NDC #1349-3593-06)  given intravenously. Patient was given 6 ml mixture of 3 ml Optison and 6 ml  saline. 3 ml wasted.  _____________________________________________________________________________  __        Interpretation Summary  Left ventricular size is normal.  LVEF 20% based on biplane 2D tracing.  Mild to moderate right ventricular dilation is present.  Global right ventricular function is moderately reduced.  Pulmonary artery systolic pressure is normal.  Dilation of the inferior vena cava is present with abnormal respiratory  variation in diameter.  _____________________________________________________________________________  __        Left Ventricle  Left ventricular size is normal. LVEF 20% based on biplane 2D tracing. Left  ventricular wall thickness is normal. Diastolic function not assessed due to  frequent ectopy. Abnormal septal motion consistent with left bundle branch  block is present.     Right Ventricle  Mild to moderate right ventricular dilation is present. Global right  ventricular function is moderately reduced.     Atria  Mild biatrial enlargement is present.     Mitral Valve  Moderate mitral insufficiency is present.        Aortic  Valve  Aortic valve is normal in structure and function.     Tricuspid Valve  Moderate tricuspid insufficiency is present. The right ventricular systolic  pressure is approximated at 24.4 mmHg plus the right atrial pressure.  Pulmonary artery systolic pressure is normal.     Pulmonic Valve  The pulmonic valve cannot be assessed. Mild pulmonic insufficiency is present.     Vessels  The aorta root is normal. The pulmonary artery cannot be assessed. Dilation of  the inferior vena cava is present with abnormal respiratory variation in  diameter.     Compared to Previous Study  Previous study not available for comparison.     _____________________________________________________________________________  __  MMode/2D Measurements & Calculations  IVSd: 0.61 cm  LVIDd: 4.7 cm  LVIDs: 3.7 cm  LVPWd: 0.75 cm  FS: 21.7 %  LV mass(C)d: 99.1 grams  LV mass(C)dI: 45.0 grams/m2     Ao root diam: 2.9 cm  asc Aorta Diam: 2.5 cm  LVOT diam: 1.9 cm  LVOT area: 2.8 cm2     EF(MOD-bp): 21.5 %  LA Volume (BP): 84.8 ml  LA Volume Index (BP): 38.5 ml/m2  RWT: 0.32        Doppler Measurements & Calculations  PA acc time: 0.09 sec     PI end-d saumya: 154.0 cm/sec  TR max saumya: 247.0 cm/sec  TR max P.4 mmHg     _____________________________________________________________________________  __           Report approved by: Graciela Tomlinson 2020 12:05 PM      XR Chest Port 1 View    Narrative    XR CHEST PORT 1 VW  2020 4:21 PM      HISTORY: SG Placement    COMPARISON: None available    FINDINGS: Single AP chest radiograph. [place]-Chucky catheter tip is in the  proximal right main pulmonary artery. Right central line tip and right  upper extremity PICC line tip at the lower SVC. Trachea is midline,  cardiomegaly. Bilateral perihilar streaky opacities. Mild increased  interstitial opacities in the right lung with ill-defined pulmonary  vascularity. No pneumothorax or pleural effusion. No acute osseous or  abdominal abnormality. Elevated  left hemidiaphragm.      Impression    IMPRESSION:  1. [place]-Chucky catheter tip at the proximal right main pulmonary artery.  2. Cardiomegaly with mild pulmonary edema, especially on the right.    I have personally reviewed the examination and initial interpretation  and I agree with the findings.    DONG SOLORIO MD   Cardiac Catheterization    Narrative      Successful insertion of a IABP in the RFA                Medications     Current Facility-Administered Medications   Medication     acetaminophen (TYLENOL) tablet 650 mg     albuterol (PROAIR HFA/PROVENTIL HFA/VENTOLIN HFA) 108 (90 Base) MCG/ACT inhaler 2 puff     allopurinol (ZYLOPRIM) tablet 200 mg     amiodarone (PACERONE/CODARONE) tablet 400 mg     aspirin (ASA) chewable tablet 81 mg     atorvastatin (LIPITOR) tablet 20 mg     bumetanide (BUMEX) injection 2 mg     calcium carbonate (TUMS) chewable tablet 500 mg     co-enzyme Q-10 capsule 200 mg     glucose gel 15-30 g    Or     dextrose 50 % injection 25-50 mL    Or     glucagon injection 1 mg     diclofenac (VOLTAREN) 1 % topical gel 4 g     DOBUTamine (DOBUTREX) 1,000 mg in D5W 250 mL infusion (adult max conc)     DOPamine 400 mg in dextrose 5% 250 mL (adult std) - premix     fentaNYL (PF) (SUBLIMAZE) injection 25 mcg     FLUoxetine (PROzac) capsule 20 mg     heparin  drip 25,000 units in 0.45% NaCl 250 mL  ANTICOAGULANT  (see additional administration details for dose)     heparin bolus from infusion pump     insulin aspart (NovoLOG) inj (RAPID ACTING)     insulin aspart (NovoLOG) inj (RAPID ACTING)     insulin glargine (LANTUS PEN) injection 10 Units     lidocaine (LMX4) cream     lidocaine 1 % 0.1-1 mL     magnesium oxide (MAG-OX) tablet 400 mg     magnesium sulfate 2 g in water intermittent infusion     magnesium sulfate 4 g in 100 mL sterile water (premade)     medication instruction     melatonin tablet 3 mg     naloxone (NARCAN) injection 0.1-0.4 mg     nitroPRUsside (NIPRIDE) 100 mg, sodium  thiosulfate 1,000 mg in D5W 62.5 mL IV infusion (conc: 1.6mg/mL)     norepinephrine (LEVOPHED) 16 mg in  mL infusion     omeprazole (priLOSEC) CR capsule 20 mg     piperacillin-tazobactam (ZOSYN) 4.5 g vial to attach to  mL bag     potassium chloride (KLOR-CON) Packet 20-40 mEq     potassium chloride 10 mEq in 100 mL intermittent infusion with 10 mg lidocaine     potassium chloride 10 mEq in 100 mL sterile water intermittent infusion (premix)     potassium chloride 20 mEq in 50 mL intermittent infusion     potassium chloride ER (K-DUR/KLOR-CON M) CR tablet 20-40 mEq     potassium phosphate 10 mmol in D5W 250 mL intermittent infusion     potassium phosphate 15 mmol in D5W 250 mL intermittent infusion     potassium phosphate 20 mmol in D5W 250 mL intermittent infusion     potassium phosphate 20 mmol in D5W 500 mL intermittent infusion     potassium phosphate 25 mmol in D5W 500 mL intermittent infusion     sodium chloride (PF) 0.9% PF flush 3 mL     sodium chloride (PF) 0.9% PF flush 3 mL     [START ON 2/14/2020] vancomycin (VANCOCIN) 2,000 mg in sodium chloride 0.9 % 250 mL intermittent infusion     vasopressin (VASOSTRICT) 40 Units in D5W 40 mL infusion     vitamin B1 (THIAMINE) tablet 100 mg

## 2020-02-13 NOTE — PLAN OF CARE
Significant decline in patient condition during shift. Hemodynamics continue to reflect extremely poor cardiac function as well as signs of sepsis. MD Acuna has been heavily involved in communication and care of patient today. Pt endorses nausea and diaphoresis - general malaise. Blood cultures from early this AM resulted gram negative rods - plan to send pt to cath lab to have new swan and IABP placed for infection control. Possibility of dialysis line placement - pt has been completely unresponsive to diuretics today.    MD Acuna and cards 2 team aware of all of the above information and drip changes today. Will continue to monitor patient closely and update team with any changes or concerns.

## 2020-02-14 ENCOUNTER — APPOINTMENT (OUTPATIENT)
Dept: GENERAL RADIOLOGY | Facility: CLINIC | Age: 65
DRG: 001 | End: 2020-02-14
Attending: STUDENT IN AN ORGANIZED HEALTH CARE EDUCATION/TRAINING PROGRAM
Payer: MEDICARE

## 2020-02-14 LAB
ALBUMIN SERPL-MCNC: 2.7 G/DL (ref 3.4–5)
ALP SERPL-CCNC: 136 U/L (ref 40–150)
ALT SERPL W P-5'-P-CCNC: 83 U/L (ref 0–70)
ANION GAP SERPL CALCULATED.3IONS-SCNC: 5 MMOL/L (ref 3–14)
ANION GAP SERPL CALCULATED.3IONS-SCNC: 6 MMOL/L (ref 3–14)
ANION GAP SERPL CALCULATED.3IONS-SCNC: 7 MMOL/L (ref 3–14)
ANION GAP SERPL CALCULATED.3IONS-SCNC: 8 MMOL/L (ref 3–14)
APTT PPP: 44 SEC (ref 22–37)
APTT PPP: 47 SEC (ref 22–37)
AST SERPL W P-5'-P-CCNC: 107 U/L (ref 0–45)
BACTERIA SPEC CULT: NO GROWTH
BASE DEFICIT BLDV-SCNC: 0.2 MMOL/L
BASE DEFICIT BLDV-SCNC: 2.3 MMOL/L
BASE DEFICIT BLDV-SCNC: 3 MMOL/L
BASE DEFICIT BLDV-SCNC: 3.4 MMOL/L
BASE DEFICIT BLDV-SCNC: 4.4 MMOL/L
BASE DEFICIT BLDV-SCNC: 4.5 MMOL/L
BASE DEFICIT BLDV-SCNC: 5.1 MMOL/L
BASE EXCESS BLDA CALC-SCNC: 0.3 MMOL/L
BASE EXCESS BLDV CALC-SCNC: 0.6 MMOL/L
BASE EXCESS BLDV CALC-SCNC: 1.2 MMOL/L
BASE EXCESS BLDV CALC-SCNC: 1.5 MMOL/L
BILIRUB SERPL-MCNC: 1.4 MG/DL (ref 0.2–1.3)
BUN SERPL-MCNC: 28 MG/DL (ref 7–30)
BUN SERPL-MCNC: 28 MG/DL (ref 7–30)
BUN SERPL-MCNC: 29 MG/DL (ref 7–30)
BUN SERPL-MCNC: 30 MG/DL (ref 7–30)
BUN SERPL-MCNC: 39 MG/DL (ref 7–30)
CA-I BLD-MCNC: 4 MG/DL (ref 4.4–5.2)
CA-I BLD-MCNC: 4.5 MG/DL (ref 4.4–5.2)
CA-I SERPL ISE-MCNC: 4.3 MG/DL (ref 4.4–5.2)
CA-I SERPL ISE-MCNC: 5.3 MG/DL (ref 4.4–5.2)
CALCIUM SERPL-MCNC: 8.4 MG/DL (ref 8.5–10.1)
CALCIUM SERPL-MCNC: 8.7 MG/DL (ref 8.5–10.1)
CALCIUM SERPL-MCNC: 8.9 MG/DL (ref 8.5–10.1)
CALCIUM SERPL-MCNC: 9.7 MG/DL (ref 8.5–10.1)
CHLORIDE SERPL-SCNC: 101 MMOL/L (ref 94–109)
CHLORIDE SERPL-SCNC: 102 MMOL/L (ref 94–109)
CHLORIDE SERPL-SCNC: 103 MMOL/L (ref 94–109)
CHLORIDE SERPL-SCNC: 99 MMOL/L (ref 94–109)
CO2 SERPL-SCNC: 22 MMOL/L (ref 20–32)
CO2 SERPL-SCNC: 26 MMOL/L (ref 20–32)
CO2 SERPL-SCNC: 28 MMOL/L (ref 20–32)
CO2 SERPL-SCNC: 28 MMOL/L (ref 20–32)
CREAT SERPL-MCNC: 1.3 MG/DL (ref 0.66–1.25)
CREAT SERPL-MCNC: 1.36 MG/DL (ref 0.66–1.25)
CREAT SERPL-MCNC: 1.42 MG/DL (ref 0.66–1.25)
CREAT SERPL-MCNC: 1.56 MG/DL (ref 0.66–1.25)
CREAT SERPL-MCNC: 1.86 MG/DL (ref 0.66–1.25)
ERYTHROCYTE [DISTWIDTH] IN BLOOD BY AUTOMATED COUNT: 18.8 % (ref 10–15)
ERYTHROCYTE [DISTWIDTH] IN BLOOD BY AUTOMATED COUNT: 18.8 % (ref 10–15)
GFR SERPL CREATININE-BSD FRML MDRD: 37 ML/MIN/{1.73_M2}
GFR SERPL CREATININE-BSD FRML MDRD: 45 ML/MIN/{1.73_M2}
GFR SERPL CREATININE-BSD FRML MDRD: 51 ML/MIN/{1.73_M2}
GFR SERPL CREATININE-BSD FRML MDRD: 54 ML/MIN/{1.73_M2}
GFR SERPL CREATININE-BSD FRML MDRD: 57 ML/MIN/{1.73_M2}
GLUCOSE BLDC GLUCOMTR-MCNC: 105 MG/DL (ref 70–99)
GLUCOSE BLDC GLUCOMTR-MCNC: 147 MG/DL (ref 70–99)
GLUCOSE BLDC GLUCOMTR-MCNC: 153 MG/DL (ref 70–99)
GLUCOSE BLDC GLUCOMTR-MCNC: 75 MG/DL (ref 70–99)
GLUCOSE BLDC GLUCOMTR-MCNC: 94 MG/DL (ref 70–99)
GLUCOSE SERPL-MCNC: 107 MG/DL (ref 70–99)
GLUCOSE SERPL-MCNC: 115 MG/DL (ref 70–99)
GLUCOSE SERPL-MCNC: 131 MG/DL (ref 70–99)
GLUCOSE SERPL-MCNC: 144 MG/DL (ref 70–99)
HCO3 BLD-SCNC: 26 MMOL/L (ref 21–28)
HCO3 BLDV-SCNC: 20 MMOL/L (ref 21–28)
HCO3 BLDV-SCNC: 21 MMOL/L (ref 21–28)
HCO3 BLDV-SCNC: 21 MMOL/L (ref 21–28)
HCO3 BLDV-SCNC: 22 MMOL/L (ref 21–28)
HCO3 BLDV-SCNC: 22 MMOL/L (ref 21–28)
HCO3 BLDV-SCNC: 23 MMOL/L (ref 21–28)
HCO3 BLDV-SCNC: 27 MMOL/L (ref 21–28)
HCO3 BLDV-SCNC: 27 MMOL/L (ref 21–28)
HCO3 BLDV-SCNC: 28 MMOL/L (ref 21–28)
HCO3 BLDV-SCNC: 28 MMOL/L (ref 21–28)
HCT VFR BLD AUTO: 40.9 % (ref 40–53)
HCT VFR BLD AUTO: 40.9 % (ref 40–53)
HGB BLD-MCNC: 13.2 G/DL (ref 13.3–17.7)
HGB BLD-MCNC: 13.5 G/DL (ref 13.3–17.7)
INTERPRETATION ECG - MUSE: NORMAL
LACTATE BLD-SCNC: 1 MMOL/L (ref 0.7–2)
LMWH PPP CHRO-ACNC: <0.1 IU/ML
Lab: NORMAL
MAGNESIUM SERPL-MCNC: 2.3 MG/DL (ref 1.6–2.3)
MAGNESIUM SERPL-MCNC: 2.3 MG/DL (ref 1.6–2.3)
MCH RBC QN AUTO: 28.1 PG (ref 26.5–33)
MCH RBC QN AUTO: 28.6 PG (ref 26.5–33)
MCHC RBC AUTO-ENTMCNC: 32.3 G/DL (ref 31.5–36.5)
MCHC RBC AUTO-ENTMCNC: 33 G/DL (ref 31.5–36.5)
MCV RBC AUTO: 87 FL (ref 78–100)
MCV RBC AUTO: 87 FL (ref 78–100)
O2/TOTAL GAS SETTING VFR VENT: 21 %
O2/TOTAL GAS SETTING VFR VENT: ABNORMAL %
O2/TOTAL GAS SETTING VFR VENT: NORMAL %
O2/TOTAL GAS SETTING VFR VENT: NORMAL %
OXYHGB MFR BLD: 98 % (ref 92–100)
OXYHGB MFR BLDV: 39 %
OXYHGB MFR BLDV: 45 %
OXYHGB MFR BLDV: 47 %
OXYHGB MFR BLDV: 47 %
OXYHGB MFR BLDV: 50 %
OXYHGB MFR BLDV: 53 %
OXYHGB MFR BLDV: 54 %
OXYHGB MFR BLDV: 55 %
OXYHGB MFR BLDV: 61 %
OXYHGB MFR BLDV: 65 %
PCO2 BLD: 45 MM HG (ref 35–45)
PCO2 BLDV: 39 MM HG (ref 40–50)
PCO2 BLDV: 39 MM HG (ref 40–50)
PCO2 BLDV: 40 MM HG (ref 40–50)
PCO2 BLDV: 42 MM HG (ref 40–50)
PCO2 BLDV: 50 MM HG (ref 40–50)
PCO2 BLDV: 50 MM HG (ref 40–50)
PCO2 BLDV: 51 MM HG (ref 40–50)
PCO2 BLDV: 52 MM HG (ref 40–50)
PH BLD: 7.37 PH (ref 7.35–7.45)
PH BLDV: 7.32 PH (ref 7.32–7.43)
PH BLDV: 7.33 PH (ref 7.32–7.43)
PH BLDV: 7.34 PH (ref 7.32–7.43)
PH BLDV: 7.34 PH (ref 7.32–7.43)
PH BLDV: 7.35 PH (ref 7.32–7.43)
PH BLDV: 7.36 PH (ref 7.32–7.43)
PHOSPHATE SERPL-MCNC: 3.6 MG/DL (ref 2.5–4.5)
PHOSPHATE SERPL-MCNC: 5.1 MG/DL (ref 2.5–4.5)
PLATELET # BLD AUTO: 107 10E9/L (ref 150–450)
PLATELET # BLD AUTO: 92 10E9/L (ref 150–450)
PO2 BLD: 128 MM HG (ref 80–105)
PO2 BLDV: 26 MM HG (ref 25–47)
PO2 BLDV: 29 MM HG (ref 25–47)
PO2 BLDV: 29 MM HG (ref 25–47)
PO2 BLDV: 30 MM HG (ref 25–47)
PO2 BLDV: 30 MM HG (ref 25–47)
PO2 BLDV: 33 MM HG (ref 25–47)
PO2 BLDV: 37 MM HG (ref 25–47)
PO2 BLDV: 39 MM HG (ref 25–47)
POTASSIUM SERPL-SCNC: 3.6 MMOL/L (ref 3.4–5.3)
POTASSIUM SERPL-SCNC: 3.7 MMOL/L (ref 3.4–5.3)
POTASSIUM SERPL-SCNC: 3.8 MMOL/L (ref 3.4–5.3)
POTASSIUM SERPL-SCNC: 3.8 MMOL/L (ref 3.4–5.3)
POTASSIUM SERPL-SCNC: 4.1 MMOL/L (ref 3.4–5.3)
PROT SERPL-MCNC: 7.1 G/DL (ref 6.8–8.8)
RBC # BLD AUTO: 4.69 10E12/L (ref 4.4–5.9)
RBC # BLD AUTO: 4.72 10E12/L (ref 4.4–5.9)
SODIUM SERPL-SCNC: 133 MMOL/L (ref 133–144)
SODIUM SERPL-SCNC: 134 MMOL/L (ref 133–144)
SODIUM SERPL-SCNC: 134 MMOL/L (ref 133–144)
SODIUM SERPL-SCNC: 135 MMOL/L (ref 133–144)
SPECIMEN SOURCE: NORMAL
VANCOMYCIN SERPL-MCNC: 16.2 MG/L
WBC # BLD AUTO: 13.5 10E9/L (ref 4–11)
WBC # BLD AUTO: 15.3 10E9/L (ref 4–11)

## 2020-02-14 PROCEDURE — 25000128 H RX IP 250 OP 636: Performed by: STUDENT IN AN ORGANIZED HEALTH CARE EDUCATION/TRAINING PROGRAM

## 2020-02-14 PROCEDURE — 85027 COMPLETE CBC AUTOMATED: CPT | Performed by: INTERNAL MEDICINE

## 2020-02-14 PROCEDURE — 00000146 ZZHCL STATISTIC GLUCOSE BY METER IP

## 2020-02-14 PROCEDURE — 40000275 ZZH STATISTIC RCP TIME EA 10 MIN

## 2020-02-14 PROCEDURE — 80048 BASIC METABOLIC PNL TOTAL CA: CPT | Performed by: INTERNAL MEDICINE

## 2020-02-14 PROCEDURE — 25000132 ZZH RX MED GY IP 250 OP 250 PS 637: Mod: GY | Performed by: STUDENT IN AN ORGANIZED HEALTH CARE EDUCATION/TRAINING PROGRAM

## 2020-02-14 PROCEDURE — 84100 ASSAY OF PHOSPHORUS: CPT | Performed by: INTERNAL MEDICINE

## 2020-02-14 PROCEDURE — 87040 BLOOD CULTURE FOR BACTERIA: CPT | Performed by: INTERNAL MEDICINE

## 2020-02-14 PROCEDURE — 84520 ASSAY OF UREA NITROGEN: CPT | Performed by: STUDENT IN AN ORGANIZED HEALTH CARE EDUCATION/TRAINING PROGRAM

## 2020-02-14 PROCEDURE — 99291 CRITICAL CARE FIRST HOUR: CPT | Mod: GC | Performed by: INTERNAL MEDICINE

## 2020-02-14 PROCEDURE — 90947 DIALYSIS REPEATED EVAL: CPT

## 2020-02-14 PROCEDURE — 25000128 H RX IP 250 OP 636: Performed by: INTERNAL MEDICINE

## 2020-02-14 PROCEDURE — 85520 HEPARIN ASSAY: CPT | Performed by: INTERNAL MEDICINE

## 2020-02-14 PROCEDURE — 40000196 ZZH STATISTIC RAPCV CVP MONITORING

## 2020-02-14 PROCEDURE — 40000048 ZZH STATISTIC DAILY SWAN MONITORING

## 2020-02-14 PROCEDURE — 83605 ASSAY OF LACTIC ACID: CPT | Performed by: STUDENT IN AN ORGANIZED HEALTH CARE EDUCATION/TRAINING PROGRAM

## 2020-02-14 PROCEDURE — 82805 BLOOD GASES W/O2 SATURATION: CPT | Performed by: STUDENT IN AN ORGANIZED HEALTH CARE EDUCATION/TRAINING PROGRAM

## 2020-02-14 PROCEDURE — 83735 ASSAY OF MAGNESIUM: CPT | Performed by: INTERNAL MEDICINE

## 2020-02-14 PROCEDURE — 25800030 ZZH RX IP 258 OP 636: Performed by: STUDENT IN AN ORGANIZED HEALTH CARE EDUCATION/TRAINING PROGRAM

## 2020-02-14 PROCEDURE — 80053 COMPREHEN METABOLIC PANEL: CPT | Performed by: INTERNAL MEDICINE

## 2020-02-14 PROCEDURE — 82330 ASSAY OF CALCIUM: CPT | Performed by: STUDENT IN AN ORGANIZED HEALTH CARE EDUCATION/TRAINING PROGRAM

## 2020-02-14 PROCEDURE — 82330 ASSAY OF CALCIUM: CPT | Performed by: INTERNAL MEDICINE

## 2020-02-14 PROCEDURE — 86022 PLATELET ANTIBODIES: CPT | Performed by: STUDENT IN AN ORGANIZED HEALTH CARE EDUCATION/TRAINING PROGRAM

## 2020-02-14 PROCEDURE — 25000125 ZZHC RX 250: Performed by: INTERNAL MEDICINE

## 2020-02-14 PROCEDURE — 82805 BLOOD GASES W/O2 SATURATION: CPT

## 2020-02-14 PROCEDURE — 20000004 ZZH R&B ICU UMMC

## 2020-02-14 PROCEDURE — 71045 X-RAY EXAM CHEST 1 VIEW: CPT

## 2020-02-14 PROCEDURE — 85730 THROMBOPLASTIN TIME PARTIAL: CPT | Performed by: STUDENT IN AN ORGANIZED HEALTH CARE EDUCATION/TRAINING PROGRAM

## 2020-02-14 PROCEDURE — 82330 ASSAY OF CALCIUM: CPT

## 2020-02-14 PROCEDURE — 80202 ASSAY OF VANCOMYCIN: CPT | Performed by: INTERNAL MEDICINE

## 2020-02-14 PROCEDURE — 25000132 ZZH RX MED GY IP 250 OP 250 PS 637: Mod: GY | Performed by: INTERNAL MEDICINE

## 2020-02-14 PROCEDURE — 40000014 ZZH STATISTIC ARTERIAL MONITORING DAILY

## 2020-02-14 PROCEDURE — 40000076 ZZH STATISTIC IABP MONITORING

## 2020-02-14 PROCEDURE — 84132 ASSAY OF SERUM POTASSIUM: CPT | Performed by: INTERNAL MEDICINE

## 2020-02-14 PROCEDURE — 25800030 ZZH RX IP 258 OP 636: Performed by: INTERNAL MEDICINE

## 2020-02-14 PROCEDURE — 82565 ASSAY OF CREATININE: CPT | Performed by: STUDENT IN AN ORGANIZED HEALTH CARE EDUCATION/TRAINING PROGRAM

## 2020-02-14 PROCEDURE — 25000132 ZZH RX MED GY IP 250 OP 250 PS 637: Mod: GY

## 2020-02-14 PROCEDURE — 36415 COLL VENOUS BLD VENIPUNCTURE: CPT | Performed by: INTERNAL MEDICINE

## 2020-02-14 RX ORDER — BUMETANIDE 0.25 MG/ML
6 INJECTION INTRAMUSCULAR; INTRAVENOUS ONCE
Status: COMPLETED | OUTPATIENT
Start: 2020-02-14 | End: 2020-02-14

## 2020-02-14 RX ORDER — HYDRALAZINE HYDROCHLORIDE 25 MG/1
25 TABLET, FILM COATED ORAL ONCE
Status: COMPLETED | OUTPATIENT
Start: 2020-02-14 | End: 2020-02-14

## 2020-02-14 RX ORDER — BUMETANIDE 0.25 MG/ML
6 INJECTION INTRAMUSCULAR; INTRAVENOUS ONCE
Status: COMPLETED | OUTPATIENT
Start: 2020-02-14 | End: 2020-02-15

## 2020-02-14 RX ORDER — MEROPENEM 1 G/1
1 INJECTION, POWDER, FOR SOLUTION INTRAVENOUS EVERY 12 HOURS
Status: DISCONTINUED | OUTPATIENT
Start: 2020-02-14 | End: 2020-02-15

## 2020-02-14 RX ORDER — NITROGLYCERIN 20 MG/100ML
.01-1.84 INJECTION INTRAVENOUS CONTINUOUS
Status: DISCONTINUED | OUTPATIENT
Start: 2020-02-14 | End: 2020-02-15

## 2020-02-14 RX ADMIN — CALCIUM CHLORIDE, MAGNESIUM CHLORIDE, SODIUM CHLORIDE, SODIUM BICARBONATE, POTASSIUM CHLORIDE AND SODIUM PHOSPHATE DIBASIC DIHYDRATE 12.5 ML/KG/HR: 3.68; 3.05; 6.34; 3.09; .314; .187 INJECTION INTRAVENOUS at 17:29

## 2020-02-14 RX ADMIN — ALLOPURINOL 200 MG: 100 TABLET ORAL at 08:38

## 2020-02-14 RX ADMIN — CALCIUM CHLORIDE 1 G: 100 INJECTION, SOLUTION INTRAVENOUS at 13:45

## 2020-02-14 RX ADMIN — AMIODARONE HYDROCHLORIDE 400 MG: 200 TABLET ORAL at 19:34

## 2020-02-14 RX ADMIN — CALCIUM CHLORIDE 1 G: 100 INJECTION, SOLUTION INTRAVENOUS at 00:42

## 2020-02-14 RX ADMIN — Medication 200 MG: at 08:38

## 2020-02-14 RX ADMIN — MEROPENEM 1 G: 1 INJECTION, POWDER, FOR SOLUTION INTRAVENOUS at 10:14

## 2020-02-14 RX ADMIN — ASPIRIN 81 MG CHEWABLE TABLET 81 MG: 81 TABLET CHEWABLE at 08:38

## 2020-02-14 RX ADMIN — PIPERACILLIN AND TAZOBACTAM 4.5 G: 4; .5 INJECTION, POWDER, FOR SOLUTION INTRAVENOUS at 03:52

## 2020-02-14 RX ADMIN — HEPARIN SODIUM 1200 UNITS/HR: 10000 INJECTION, SOLUTION INTRAVENOUS at 06:34

## 2020-02-14 RX ADMIN — AMIODARONE HYDROCHLORIDE 400 MG: 200 TABLET ORAL at 08:39

## 2020-02-14 RX ADMIN — CHLOROTHIAZIDE SODIUM 1000 MG: 500 INJECTION, POWDER, LYOPHILIZED, FOR SOLUTION INTRAVENOUS at 06:17

## 2020-02-14 RX ADMIN — HYDRALAZINE HYDROCHLORIDE 25 MG: 25 TABLET, FILM COATED ORAL at 19:33

## 2020-02-14 RX ADMIN — MELATONIN TAB 3 MG 3 MG: 3 TAB at 00:12

## 2020-02-14 RX ADMIN — CALCIUM CHLORIDE, MAGNESIUM CHLORIDE, SODIUM CHLORIDE, SODIUM BICARBONATE, POTASSIUM CHLORIDE AND SODIUM PHOSPHATE DIBASIC DIHYDRATE 12.5 ML/KG/HR: 3.68; 3.05; 6.34; 3.09; .314; .187 INJECTION INTRAVENOUS at 09:53

## 2020-02-14 RX ADMIN — INSULIN GLARGINE 10 UNITS: 100 INJECTION, SOLUTION SUBCUTANEOUS at 22:15

## 2020-02-14 RX ADMIN — ACETAMINOPHEN 650 MG: 325 TABLET, FILM COATED ORAL at 00:11

## 2020-02-14 RX ADMIN — CALCIUM CHLORIDE, MAGNESIUM CHLORIDE, SODIUM CHLORIDE, SODIUM BICARBONATE, POTASSIUM CHLORIDE AND SODIUM PHOSPHATE DIBASIC DIHYDRATE 12.5 ML/KG/HR: 3.68; 3.05; 6.34; 3.09; .314; .187 INJECTION INTRAVENOUS at 06:10

## 2020-02-14 RX ADMIN — CALCIUM CHLORIDE, MAGNESIUM CHLORIDE, SODIUM CHLORIDE, SODIUM BICARBONATE, POTASSIUM CHLORIDE AND SODIUM PHOSPHATE DIBASIC DIHYDRATE 12.5 ML/KG/HR: 3.68; 3.05; 6.34; 3.09; .314; .187 INJECTION INTRAVENOUS at 09:55

## 2020-02-14 RX ADMIN — THIAMINE HCL TAB 100 MG 100 MG: 100 TAB at 08:39

## 2020-02-14 RX ADMIN — MAGNESIUM OXIDE 400 MG: 400 TABLET ORAL at 08:38

## 2020-02-14 RX ADMIN — VANCOMYCIN HYDROCHLORIDE 2000 MG: 10 INJECTION, POWDER, LYOPHILIZED, FOR SOLUTION INTRAVENOUS at 08:17

## 2020-02-14 RX ADMIN — ATORVASTATIN CALCIUM 20 MG: 20 TABLET, FILM COATED ORAL at 19:34

## 2020-02-14 RX ADMIN — CALCIUM CHLORIDE, MAGNESIUM CHLORIDE, SODIUM CHLORIDE, SODIUM BICARBONATE, POTASSIUM CHLORIDE AND SODIUM PHOSPHATE DIBASIC DIHYDRATE 12.5 ML/KG/HR: 3.68; 3.05; 6.34; 3.09; .314; .187 INJECTION INTRAVENOUS at 13:56

## 2020-02-14 RX ADMIN — CALCIUM CHLORIDE, MAGNESIUM CHLORIDE, SODIUM CHLORIDE, SODIUM BICARBONATE, POTASSIUM CHLORIDE AND SODIUM PHOSPHATE DIBASIC DIHYDRATE 12.5 ML/KG/HR: 3.68; 3.05; 6.34; 3.09; .314; .187 INJECTION INTRAVENOUS at 13:57

## 2020-02-14 RX ADMIN — DICLOFENAC 4 G: 10 GEL TOPICAL at 19:36

## 2020-02-14 RX ADMIN — CALCIUM CHLORIDE 2 G: 100 INJECTION, SOLUTION INTRAVENOUS at 16:35

## 2020-02-14 RX ADMIN — DOBUTAMINE 7.5 MCG/KG/MIN: 12.5 INJECTION, SOLUTION INTRAVENOUS at 14:13

## 2020-02-14 RX ADMIN — CALCIUM CHLORIDE, MAGNESIUM CHLORIDE, SODIUM CHLORIDE, SODIUM BICARBONATE, POTASSIUM CHLORIDE AND SODIUM PHOSPHATE DIBASIC DIHYDRATE 12.5 ML/KG/HR: 3.68; 3.05; 6.34; 3.09; .314; .187 INJECTION INTRAVENOUS at 02:27

## 2020-02-14 RX ADMIN — DICLOFENAC 4 G: 10 GEL TOPICAL at 16:57

## 2020-02-14 RX ADMIN — BUMETANIDE 6 MG: 0.25 INJECTION INTRAMUSCULAR; INTRAVENOUS at 06:18

## 2020-02-14 RX ADMIN — FLUOXETINE 20 MG: 20 CAPSULE ORAL at 08:38

## 2020-02-14 RX ADMIN — CALCIUM CHLORIDE, MAGNESIUM CHLORIDE, SODIUM CHLORIDE, SODIUM BICARBONATE, POTASSIUM CHLORIDE AND SODIUM PHOSPHATE DIBASIC DIHYDRATE 12.5 ML/KG/HR: 3.68; 3.05; 6.34; 3.09; .314; .187 INJECTION INTRAVENOUS at 21:09

## 2020-02-14 RX ADMIN — CALCIUM CHLORIDE, MAGNESIUM CHLORIDE, SODIUM CHLORIDE, SODIUM BICARBONATE, POTASSIUM CHLORIDE AND SODIUM PHOSPHATE DIBASIC DIHYDRATE 12.5 ML/KG/HR: 3.68; 3.05; 6.34; 3.09; .314; .187 INJECTION INTRAVENOUS at 21:07

## 2020-02-14 RX ADMIN — BIVALIRUDIN 0.02 MG/KG/HR: 250 INJECTION, POWDER, LYOPHILIZED, FOR SOLUTION INTRAVENOUS at 16:29

## 2020-02-14 RX ADMIN — MEROPENEM 1 G: 1 INJECTION, POWDER, FOR SOLUTION INTRAVENOUS at 22:02

## 2020-02-14 RX ADMIN — OMEPRAZOLE 20 MG: 20 CAPSULE, DELAYED RELEASE ORAL at 08:39

## 2020-02-14 ASSESSMENT — ACTIVITIES OF DAILY LIVING (ADL)
ADLS_ACUITY_SCORE: 16
ADLS_ACUITY_SCORE: 16
ADLS_ACUITY_SCORE: 15
ADLS_ACUITY_SCORE: 16

## 2020-02-14 ASSESSMENT — PAIN DESCRIPTION - DESCRIPTORS: DESCRIPTORS: SHARP

## 2020-02-14 ASSESSMENT — MIFFLIN-ST. JEOR: SCORE: 1763.63

## 2020-02-14 NOTE — PHARMACY-VANCOMYCIN DOSING SERVICE
Pharmacy Vancomycin Note  Date of Service 2020  Patient's  1955   64 year old, male    Indication: Bacteremia   Microbiology:   2020 0353 2020 Blood culture [[id number]]   (Abnormal)   Blood   Left Hand    Preliminary result Specimen Description Blood Left Hand   Culture Micro Cultured on the 1st day of incubation:   Gram negative rods   Abnormal  P   Culture Micro Critical Value/Significant Value, preliminary result only, called to and read back by   Mima Cabrera RN. @1426. 20. BS.   P   Culture Micro Cultured on the 1st day of incubation:   Gram positive cocci in pairs   Abnormal  P   Culture Micro Critical Value/Significant Value, preliminary result only, called to and read back by   Alisson Castillo RN on 20 at 1728.  JRT   P   Culture Micro (Note)   POSITIVE for ENTEROCOCCUS FAECALIS and NEGATIVE for Ernst/vanB genes   by [employer] multiplex nucleic acid test. Final identification and   antimicrobial susceptibility testing will be verified by standard   methods.     Specimen tested with Verigene multiplex, gram-positive blood culture   nucleic acid test for the following targets: Staph aureus, Staph   epidermidis, Staph lugdunensis, other Staph species, Enterococcus   faecalis, Enterococcus faecium, Streptococcus species, S. agalactiae,   S. anginosus grp., S. pneumoniae, S. pyogenes, Listeria sp., mecA   (methicillin resistance) and Ernst/B (vancomycin resistance).     Critical Value/Significant Value called to and read back by MARIA EUGENIA MILLAN RN 20 EH       P           2020 0350 2020 0538 Blood culture [[id number]]   (Abnormal)   Blood   Right Hand    Preliminary result Specimen Description Blood Right Hand   Culture Micro Cultured on the 1st day of incubation:   Proteus mirabilis   Abnormal  P   Culture Micro Critical Value/Significant Value, preliminary result only, called to and read back by   Mima Cabrera RN. @1426. 20. BS.   P   Culture  Micro Cultured on the 1st day of incubation:   Gram positive cocci in pairs   Abnormal  P   Culture Micro Critical Value/Significant Value, preliminary result only, called to and read back by   Alisson Leon RN on 2.13.20 at 1728.  JRT   P   Culture Micro (Note)   POSITIVE for PROTEUS SPECIES by Verigene multiplex nucleic acid test.   Final identification and antimicrobial susceptibility testing will be   verified by standard methods.     Specimen tested with Verigene multiplex, gram-negative blood culture   nucleic acid test for the following targets: Acinetobacter sp.,   Citrobacter sp., Enterobacter sp., Proteus sp., E. coli, K.   pneumoniae/oxytoca, P. aeruginosa, and the following resistance   markers: CTXM, KPC, NDM, VIM, IMP and OXA.     Critical Value/Significant Value called to and read back by   Alisson Leon RN @ 2224. 2/13/20. AV           Goal Trough Level: 15-20 mg/L  Day of Therapy: 2  Current Vancomycin regimen:  Intermittent    Current estimated CrCl = Unclear; CRRT + diuresis; last 24 hours 0.29 mL/kg/hr    Creatinine for last 3 days  2/11/2020:  2:47 PM Creatinine 1.15 mg/dL  2/12/2020:  4:40 AM Creatinine 1.35 mg/dL;  4:01 PM Creatinine 1.23 mg/dL  2/13/2020:  2:06 AM Creatinine 1.58 mg/dL;  6:37 AM Creatinine 1.72 mg/dL; 12:17 PM Creatinine 2.09 mg/dL;  4:59 PM Creatinine 2.21 mg/dL;  9:47 PM Creatinine 2.31 mg/dL  2/14/2020:  3:31 AM Creatinine 1.86 mg/dL    Recent Vancomycin Levels (past 3 days)  2/14/2020:  3:31 AM Vancomycin Level 16.2 mg/L    Vancomycin IV Administrations (past 72 hours)                   vancomycin (VANCOCIN) 2,500 mg in sodium chloride 0.9 % 500 mL intermittent infusion (mg) 2,500 mg New Bag 02/13/20 0507                Nephrotoxins and other renal medications (From now, onward)    Start     Dose/Rate Route Frequency Ordered Stop    02/14/20 0630  vancomycin (VANCOCIN) 2,000 mg in sodium chloride 0.9 % 500 mL intermittent infusion      2,000 mg  over 2 Hours  Intravenous EVERY 24 HOURS 02/14/20 0619      02/13/20 1724  tobramycin (NEBCIN) place cheng - receiving intermittent dosing      1 each Injection SEE ADMIN INSTRUCTIONS 02/13/20 1725      02/13/20 1430  bumetanide (BUMEX) injection 4 mg      4 mg Intravenous ONCE 02/13/20 1426      02/13/20 0330  piperacillin-tazobactam (ZOSYN) 4.5 g vial to attach to  mL bag      4.5 g  over 30 Minutes Intravenous EVERY 6 HOURS 02/13/20 0301      02/08/20 1800  DOBUTamine (DOBUTREX) 1,000 mg in D5W 250 mL infusion (adult max conc)      2.5-7.5 mcg/kg/min × 108.9 kg (Dosing Weight)  4.1-12.3 mL/hr  Intravenous CONTINUOUS 02/08/20 1757            Interpretation of levels and current regimen:  Trough level is  Therapeutic  Urine output:  diminished urine output (with diuretics) + CRRT  Renal Function: diminished urine output (with diuretics) + CRRT    Plan:  1.  Will restart vancomycin 2000mg IV Q24h; will discuss possible discontinuation of vancomycin with bacteremia with POSITIVE for ENTEROCOCCUS FAECALIS and NEGATIVE for Ernst/vanB genes   2.  Pharmacy will check trough levels as appropriate in 1-3 Days.    3. Serum creatinine levels will be ordered daily.      Kari Alvarez PharmD  [facility] Pharmacist  [phone]  [phone]          .

## 2020-02-14 NOTE — PROGRESS NOTES
Nephrology Progress Note  02/14/2020       Mr Eliseo Tanner is a 65yo M with  H/O  Stage D Class IV HF,NICM( LVEF 15-20 %) moderate CAD, HTN, DM2, CKD, admitted to [facility] on 2/7/20 with Cardiogenic shock and V fib with multiple ICD shocks. Patient is not responding to diuresis , on pressure support . Developed rigors and there is concern for septic shock .  Nephrology consulted for BERNARDA, started CRRT 2/13 for volume management.      Interval History :   Mr Tanner continues with CRRT started last evening for volume management with cardiogenic and septic shock, pulling 0-50cc/hr and he remains on Dobutamine, Dopamine and IABP for support.  Last CVP=14, will continue with volume management with CRRT.    Assessment & Recommendations:   BERNARDA on CKD stage 3-Baseline Cr 1.5-2, up to 2.2 at time of consult with decreasing UOP in setting of cardiogenic shock despite diuretics.  He has underlying CKD likely from T2DM and HTN . Follows with Dr Jimbo Vila, Nephrologist  [facility] in [place], [place] . Review of his note shows that patient has CKD stage 3 ( baseline Cr 1.3 - 1.5) with Non nephrotic range proteinuria , MGUS, Chronic gout, HTN.  Etiology of BERNARDA felt to be CRS with likely some degree of ATN with septic/distributive shock.  CRRT was started on 2/13 for volume management, pulling 0-50cc/hr with both inotrope and IABP support.     -Line is [facility] 2/13 temp line   -Continuing CRRT, 4k baths, 0-50cc/hr net negative.      Volume status-Net positive yesterday after being negative daily for the previous week.  UOP on the decline, running CRRT for volume removal with suspected CRS, last CVP 14 with PCWP >20.   Ordered for 0-50cc/hr net negative with high obligate intake.     Electrolytes/pH-K 3.7, bicarb 26, no acute issues on CRRT.      Ca/phos/pth-Ca 8.4, Mg and Phos reasonable, on CRRT.        Anemia-Hgb 13.5, no acute issues.     Nutrition-Taking some PO.      Seen and discussed with Dr Miller.      Recommendations were  "communicated to primary team via verbal communication.     JUAN Avery CNS  Clinical Nurse Specialist  [phone]      Review of Systems:   I reviewed the following systems:  Gen: No fevers or chills  CV: No CP at rest  Resp: No SOB at rest  GI: No N/V    Physical Exam:   I/O last 3 completed shifts:  In: 2349.19 [P.O.:700; I.V.:1649.19]  Out: 2244 [Urine:1293; Emesis/NG output:350; Other:601]   BP (!) 86/58   Pulse 102   Temp 96.8  F (36  C) (Axillary)   Resp 20   Ht 1.702 m (5' 7\")   Wt 102.5 kg (225 lb 15.5 oz)   SpO2 96%   BMI 35.39 kg/m       GENERAL APPEARANCE: no distress, he is  awake  EYES: no scleral icterus, pupils equal  Endo: no goiter, no moon facies  Lymphatics: no cervical or supraclavicular LAD  Pulmonary: lungs clear to auscultation with equal breath sounds bilaterally, no clubbing  CV: regular rhythm, normal rate, no rub   - JVD no -   Edema 1+ pedal edema   GI: soft, nontender, normal bowel sounds  MS: no evidence of inflammation in joints, no muscle tenderness  : has  lombardo  SKIN: no rash, warm, dry, no cyanosis  NEURO: face symmetric, no asterixis     Labs:   All labs reviewed by me  Electrolytes/Renal -   Recent Labs   Lab Test 02/14/20  1058 02/14/20  0331 02/14/20  0038 02/13/20  2147  02/13/20  0637 02/13/20  0206    134  --  130*   < > 132* 132*   POTASSIUM 3.7 3.8 3.8 4.1   < > 4.0 4.1  4.2   CHLORIDE 102 99  --  96   < > 93* 96   CO2 26 28  --  24   < > 29 22   BUN 30 39*  --  45*   < > 35* 34*   CR 1.56* 1.86*  --  2.31*   < > 1.72* 1.58*   * 107*  --  165*   < > 169* 154*   CALOS 8.4* 8.7  --  8.3*   < > 8.9 8.9   MAG  --  2.3  --  2.3  --  2.3 2.0   PHOS  --  5.1*  --  5.5*  --   --  5.4*    < > = values in this interval not displayed.       CBC -   Recent Labs   Lab Test 02/14/20 0331 02/13/20  1030 02/13/20  0206   WBC 15.3* 15.9* 1.9*   HGB 13.5 14.5 13.3   PLT 92* 88* 92*       LFTs -   Recent Labs   Lab Test 02/14/20 0331 02/13/20  1217 " 02/13/20  0206   ALKPHOS 136 177* 150   BILITOTAL 1.4* 1.8* 0.8   ALT 83* 58 38   * 83* 40   PROTTOTAL 7.1 7.3 7.4   ALBUMIN 2.7* 2.7* 3.0*       Iron Panel -   Recent Labs   Lab Test 02/08/20  0408   IRON 25*   IRONSAT 8*   HEAVENLY 189           Current Medications:    allopurinol  200 mg Oral Daily     amiodarone  400 mg Oral BID     aspirin  81 mg Oral Daily     atorvastatin  20 mg Oral QPM     calcium gluconate  2 g Intravenous Once     co-enzyme Q-10  200 mg Oral Daily     diclofenac  4 g Topical 4x Daily     FLUoxetine  20 mg Oral Daily     insulin aspart  1-7 Units Subcutaneous TID AC     insulin aspart  1-5 Units Subcutaneous At Bedtime     insulin glargine  10 Units Subcutaneous At Bedtime     magnesium oxide  400 mg Oral Daily     meropenem  1 g Intravenous Q12H     omeprazole  20 mg Oral QAM AC     sodium chloride (PF)  3 mL Intracatheter Q8H     vancomycin (VANCOCIN) IV  2,000 mg (central catheter) Intravenous Q24H     vitamin B1  100 mg Oral Daily       CRRT replacement solution 12.5 mL/kg/hr (02/14/20 0953)     DOBUTamine 7.5 mcg/kg/min (02/14/20 1300)     DOPamine 3 mcg/kg/min (02/14/20 1300)     HEParin 1,200 Units/hr (02/14/20 1300)     - MEDICATION INSTRUCTIONS -       nitroGLYcerin Stopped (02/14/20 0226)     - MEDICATION INSTRUCTIONS -       CRRT replacement solution 1.837 mL/kg/hr (02/13/20 2214)     CRRT replacement solution 12.5 mL/kg/hr (02/14/20 0955)

## 2020-02-14 NOTE — PROGRESS NOTES
Cardiology Progress Note  Eliseo Tanner MRN: [id number]  Age: 64 year old, : 1955  Date: 2020              Assessment and Plan:     Mr Eliseo Tanner is a 63yo M w/PMHx notable for chronic systolic heart failure, NICM, moderate CAD, HTN, ABHINAV on CPAP, DM2, CKDIII who is transferred to [facility] for evaluation and management of advanced heart failure with arrhythmia.     Today's plan ():  -Continue CRRT  -Stop zosyn and tobramycin, start meropenem      Moderate CAD  Severe Mitral regurgitation  Chronic nonischemic systolic heart failure w/ reduced EF (15-20%) and exacerbation  NYHA Class III. Echo 2020: LVEF 15-20%; LVEDd 5.9 cm; 4+ MR. [facility] 2019 w/ nonobstructive CAD w/ severe branch disease. Presented with increased wt gain, SOB, LE edema concerning for HF exacerbation, initially concerning for cardiogenic shock. Admitted to [facility] for further evaluation regarding need for advanced HF therapies. Overall, impression is that once infections are clear and renal function improved, can consider listing for OHT or LVAD placement.  -Financial approval obtained for OHT/LVAD w/u; order set has been placed  -[place] placed: hemodynamics w/ [place] CO/CI q6h  -Telemetry  -Lactic acid, VBG ordered; trend  -Beta-blocker: None due to decompensated HF  -ACEi/ARB/ARNI: Holding home Entresto  mg BID  -Aldosterone antagonist: Holding spironolactone until renal assessed   -Volume status: Hypervolemic on exam. Weight on admit 240. Baseline weight 225-230.  -Diuretic regimen: for : 6 mg bumex and 1g diuril  -Continue dobutmaine 7.5, Dopamine 5  -Afterload reduction: holding in setting of being on dopmaine  -Continue ASA 81, atorvastatin 20 mg    Proteus and Enterococcus bacteremia  Organisms growing from  blood cultures from .  -daily blood cultures until negative  -Zosyn (-  -Tobramycin (-)  -Vancomycin (-  -Meropenem (-    BERNARDA on CKDIII  Cr on  admission 1.7, baseline Cr 1.5-2. Goal to improve renal function with diuresis in order to make best candidate for potential LVAD.  -Trend Cr  -Daily fluid balance  -Diuresis as above    Precapillary pulmonary hypertension:  Significant transpulmonary gradient of 14 on right heart cath numbers. May be related to CTEPH vs. WHO I.  -Empiric heparin  -V/Q ordered on 2/6, but not done yet    #Thrombocytopenia:  Concern for HIT given timing with respect to heparin exposure. Was briefly on argatroban, but back on heparin given negative HIT screen.  -Heparin ggt for IABP    VFib requiring shock  Shocked x 3 prior to transfer, loaded with amio. No known prior history. Suspect related to underlying HF.   -Keep K>4, Mg>2  -Amio 400 mg PO BID  -Need ICD for secondary prevention     Diabetes Mellitus Type II  Last A1c 7.2% 2/6/20  Home regimen includes: 15U basaglar at bedtime, glipizide 2.5  -Will resume home glargine at 30% reduction, 10U QHS  -Holding home oral regimen while renal function and/or hemodynamic status is either impaired or threatened  -Preprandial blood glucose target <140 mg/dL and random <180mg/dl, or 140-180 mg/dL for critically ill  -SSI insulin, q4h blood sugar checks, hypoglycemia protocol ordered     Chronic:  ABHINAV: Home CPAP  Gout: PTA allopurinol 200 daily  MDD: PTA fluoxetine  GERD: PTA PPI  COPD: albuterol PRN    FEN:  2gm Na   2L water restriction    PPX: Heparin ggt    CODE: FULL CODE     Patient was discussed with staff attending, Dr. Tomas, who agrees with the above assessment and plan.    Stanford Acuna MD  Cardiology Fellow, PGY-5  Pager: [phone]            Subjective/Interval Events     Overnight MAPs and CI dropped, patient had rigors. Infectious work-up ordered. Started on empiric antibiotics. Captopril stopped and started on dopamine.    This AM, patient reports no fevers, chills, SOB, dysuria, abdominal pain, diarrhea, headache.    No CP, lightheadedness, palpitations.           "Objective     BP (!) 86/58   Pulse 102   Temp 96.6  F (35.9  C) (Axillary)   Resp 14   Ht 1.702 m (5' 7\")   Wt 102.5 kg (225 lb 15.5 oz)   SpO2 96%   BMI 35.39 kg/m    Temp:  [96.6  F (35.9  C)-98.4  F (36.9  C)] 96.6  F (35.9  C)  Heart Rate:  [] 97  Resp:  [14-26] 14  MAP:  [60 mmHg-110 mmHg] 77 mmHg  Arterial Line BP: ()/(37-97) 107/51  SpO2:  [92 %-100 %] 96 %  Wt Readings from Last 2 Encounters:   02/14/20 102.5 kg (225 lb 15.5 oz)     I/O last 3 completed shifts:  In: 2349.19 [P.O.:700; I.V.:1649.19]  Out: 2244 [Urine:1293; Emesis/NG output:350; Other:601]      Gen: No acute distress  HEENT: NC/AT, PERRL, EOM intact, MMM, OP without exudates  PULM/THORAX: Clear to auscultation bilaterally, no rales/rhonchi/wheezes  CV: normal rate, regular rhythm, normal S1 and S2, no murmurs or rubs.  ABD: Soft, NTND, bowel sounds present, no masses  EXT: WWP. No LE edema, clubbing or cyanosis.  NEURO: CN II-XII grossly intact. A&Ox3    Lines:  -R IJ PA catheter  -Radial arterial line  -L femoral arterial IABP            Data:     Recent Results (from the past 24 hour(s))   Blood gas venous with oxyhemoglobin (PCU Collect TBD)    Collection Time: 02/13/20  9:48 AM   Result Value Ref Range    Ph Venous 7.38 7.32 - 7.43 pH    PCO2 Venous 51 (H) 40 - 50 mm Hg    PO2 Venous 25 25 - 47 mm Hg    Bicarbonate Venous 31 (H) 21 - 28 mmol/L    FIO2 3L     Oxyhemoglobin Venous 38 %    Base Excess Venous 4.2 mmol/L   Lactic acid whole blood    Collection Time: 02/13/20  9:48 AM   Result Value Ref Range    Lactic Acid 2.3 (H) 0.7 - 2.0 mmol/L   Glucose by meter    Collection Time: 02/13/20  9:52 AM   Result Value Ref Range    Glucose 200 (H) 70 - 99 mg/dL   CBC Differential (AM Draw)    Collection Time: 02/13/20 10:30 AM   Result Value Ref Range    WBC 15.9 (H) 4.0 - 11.0 10e9/L    RBC Count 5.11 4.4 - 5.9 10e12/L    Hemoglobin 14.5 13.3 - 17.7 g/dL    Hematocrit 44.5 40.0 - 53.0 %    MCV 87 78 - 100 fl    MCH 28.4 26.5 " - 33.0 pg    MCHC 32.6 31.5 - 36.5 g/dL    RDW 18.6 (H) 10.0 - 15.0 %    Platelet Count 88 (L) 150 - 450 10e9/L    Diff Method Automated Method     % Neutrophils 92.5 %    % Lymphocytes 2.6 %    % Monocytes 2.5 %    % Eosinophils 0.1 %    % Basophils 0.6 %    % Immature Granulocytes 1.7 %    Nucleated RBCs 0 0 /100    Absolute Neutrophil 14.8 (H) 1.6 - 8.3 10e9/L    Absolute Lymphocytes 0.4 (L) 0.8 - 5.3 10e9/L    Absolute Monocytes 0.4 0.0 - 1.3 10e9/L    Absolute Eosinophils 0.0 0.0 - 0.7 10e9/L    Absolute Basophils 0.1 0.0 - 0.2 10e9/L    Abs Immature Granulocytes 0.3 0 - 0.4 10e9/L    Absolute Nucleated RBC 0.0    Glucose by meter    Collection Time: 02/13/20 11:48 AM   Result Value Ref Range    Glucose 225 (H) 70 - 99 mg/dL   Blood gas venous with oxyhemoglobin (PCU Collect TBD)    Collection Time: 02/13/20 11:54 AM   Result Value Ref Range    Ph Venous 7.35 7.32 - 7.43 pH    PCO2 Venous 51 (H) 40 - 50 mm Hg    PO2 Venous 23 (L) 25 - 47 mm Hg    Bicarbonate Venous 28 21 - 28 mmol/L    FIO2 21     Oxyhemoglobin Venous 29 %    Base Excess Venous 1.0 mmol/L   Lactic acid whole blood    Collection Time: 02/13/20 11:54 AM   Result Value Ref Range    Lactic Acid 3.9 (H) 0.7 - 2.0 mmol/L   Comprehensive metabolic panel    Collection Time: 02/13/20 12:17 PM   Result Value Ref Range    Sodium 131 (L) 133 - 144 mmol/L    Potassium 4.7 3.4 - 5.3 mmol/L    Chloride 96 94 - 109 mmol/L    Carbon Dioxide 22 20 - 32 mmol/L    Anion Gap 13 3 - 14 mmol/L    Glucose 262 (H) 70 - 99 mg/dL    Urea Nitrogen 38 (H) 7 - 30 mg/dL    Creatinine 2.09 (H) 0.66 - 1.25 mg/dL    GFR Estimate 32 (L) >60 mL/min/[1.73_m2]    GFR Estimate If Black 37 (L) >60 mL/min/[1.73_m2]    Calcium 8.2 (L) 8.5 - 10.1 mg/dL    Bilirubin Total 1.8 (H) 0.2 - 1.3 mg/dL    Albumin 2.7 (L) 3.4 - 5.0 g/dL    Protein Total 7.3 6.8 - 8.8 g/dL    Alkaline Phosphatase 177 (H) 40 - 150 U/L    ALT 58 0 - 70 U/L    AST 83 (H) 0 - 45 U/L   Blood gas venous with  oxyhemoglobin (PCU Collect TBD)    Collection Time: 02/13/20  1:31 PM   Result Value Ref Range    Ph Venous 7.35 7.32 - 7.43 pH    PCO2 Venous 50 40 - 50 mm Hg    PO2 Venous 24 (L) 25 - 47 mm Hg    Bicarbonate Venous 27 21 - 28 mmol/L    FIO2 21     Oxyhemoglobin Venous 33 %    Base Excess Venous 0.7 mmol/L   Blood gas venous with oxyhemoglobin (PCU Collect TBD)    Collection Time: 02/13/20  2:52 PM   Result Value Ref Range    Ph Venous 7.35 7.32 - 7.43 pH    PCO2 Venous 50 40 - 50 mm Hg    PO2 Venous 25 25 - 47 mm Hg    Bicarbonate Venous 28 21 - 28 mmol/L    FIO2 21     Oxyhemoglobin Venous 35 %    Base Excess Venous 1.0 mmol/L   Lactic acid whole blood    Collection Time: 02/13/20  2:52 PM   Result Value Ref Range    Lactic Acid 2.9 (H) 0.7 - 2.0 mmol/L   Basic metabolic panel    Collection Time: 02/13/20  4:59 PM   Result Value Ref Range    Sodium 131 (L) 133 - 144 mmol/L    Potassium 4.4 3.4 - 5.3 mmol/L    Chloride 94 94 - 109 mmol/L    Carbon Dioxide 26 20 - 32 mmol/L    Anion Gap 11 3 - 14 mmol/L    Glucose 228 (H) 70 - 99 mg/dL    Urea Nitrogen 42 (H) 7 - 30 mg/dL    Creatinine 2.21 (H) 0.66 - 1.25 mg/dL    GFR Estimate 30 (L) >60 mL/min/[1.73_m2]    GFR Estimate If Black 35 (L) >60 mL/min/[1.73_m2]    Calcium 8.3 (L) 8.5 - 10.1 mg/dL   Lactic acid whole blood    Collection Time: 02/13/20  4:59 PM   Result Value Ref Range    Lactic Acid 3.0 (H) 0.7 - 2.0 mmol/L   Glucose by meter    Collection Time: 02/13/20  9:03 PM   Result Value Ref Range    Glucose 107 (H) 70 - 99 mg/dL   Blood gas arterial with oxyhemoglobin (PCU Collect TBD)    Collection Time: 02/13/20  9:06 PM   Result Value Ref Range    pH Arterial 7.44 7.35 - 7.45 pH    pCO2 Arterial 37 35 - 45 mm Hg    pO2 Arterial 85 80 - 105 mm Hg    Bicarbonate Arterial 25 21 - 28 mmol/L    FIO2 21     Oxyhemoglobin Arterial 96 92 - 100 %    Base Excess Art 0.7 mmol/L   Blood gas venous with oxyhemoglobin (PCU Collect TBD)    Collection Time: 02/13/20  9:06 PM    Result Value Ref Range    Ph Venous 7.37 7.32 - 7.43 pH    PCO2 Venous 35 (L) 40 - 50 mm Hg    PO2 Venous 25 25 - 47 mm Hg    Bicarbonate Venous 20 (L) 21 - 28 mmol/L    FIO2 21     Oxyhemoglobin Venous 38 %    Base Deficit Venous 4.3 mmol/L   Tobramycin    Collection Time: 02/13/20  9:47 PM   Result Value Ref Range    Tobramycin Level 13.0 mg/L   Basic metabolic panel    Collection Time: 02/13/20  9:47 PM   Result Value Ref Range    Sodium 130 (L) 133 - 144 mmol/L    Potassium 4.1 3.4 - 5.3 mmol/L    Chloride 96 94 - 109 mmol/L    Carbon Dioxide 24 20 - 32 mmol/L    Anion Gap 10 3 - 14 mmol/L    Glucose 165 (H) 70 - 99 mg/dL    Urea Nitrogen 45 (H) 7 - 30 mg/dL    Creatinine 2.31 (H) 0.66 - 1.25 mg/dL    GFR Estimate 28 (L) >60 mL/min/[1.73_m2]    GFR Estimate If Black 33 (L) >60 mL/min/[1.73_m2]    Calcium 8.3 (L) 8.5 - 10.1 mg/dL   Calcium ionized    Collection Time: 02/13/20  9:47 PM   Result Value Ref Range    Calcium Ionized 4.1 (L) 4.4 - 5.2 mg/dL   Magnesium    Collection Time: 02/13/20  9:47 PM   Result Value Ref Range    Magnesium 2.3 1.6 - 2.3 mg/dL   Phosphorus    Collection Time: 02/13/20  9:47 PM   Result Value Ref Range    Phosphorus 5.5 (H) 2.5 - 4.5 mg/dL   Activated clotting time POCT    Collection Time: 02/13/20  9:52 PM   Result Value Ref Range    Activated Clot Time 154 (H) 75 - 150 sec   Blood gas venous with oxyhemoglobin (PCU Collect TBD)    Collection Time: 02/14/20 12:38 AM   Result Value Ref Range    Ph Venous 7.33 7.32 - 7.43 pH    PCO2 Venous 40 40 - 50 mm Hg    PO2 Venous 33 25 - 47 mm Hg    Bicarbonate Venous 21 21 - 28 mmol/L    FIO2 3L     Oxyhemoglobin Venous 53 %    Base Deficit Venous 4.4 mmol/L   Potassium    Collection Time: 02/14/20 12:38 AM   Result Value Ref Range    Potassium 3.8 3.4 - 5.3 mmol/L   Heparin Xa (10a) Level    Collection Time: 02/14/20  3:31 AM   Result Value Ref Range    Heparin 10A Level <0.10 IU/mL   Vancomycin level    Collection Time: 02/14/20  3:31 AM    Result Value Ref Range    Vancomycin Level 16.2 mg/L   Comprehensive metabolic panel    Collection Time: 02/14/20  3:31 AM   Result Value Ref Range    Sodium 134 133 - 144 mmol/L    Potassium 3.8 3.4 - 5.3 mmol/L    Chloride 99 94 - 109 mmol/L    Carbon Dioxide 28 20 - 32 mmol/L    Anion Gap 6 3 - 14 mmol/L    Glucose 107 (H) 70 - 99 mg/dL    Urea Nitrogen 39 (H) 7 - 30 mg/dL    Creatinine 1.86 (H) 0.66 - 1.25 mg/dL    GFR Estimate 37 (L) >60 mL/min/[1.73_m2]    GFR Estimate If Black 43 (L) >60 mL/min/[1.73_m2]    Calcium 8.7 8.5 - 10.1 mg/dL    Bilirubin Total 1.4 (H) 0.2 - 1.3 mg/dL    Albumin 2.7 (L) 3.4 - 5.0 g/dL    Protein Total 7.1 6.8 - 8.8 g/dL    Alkaline Phosphatase 136 40 - 150 U/L    ALT 83 (H) 0 - 70 U/L     (H) 0 - 45 U/L   CBC with platelets    Collection Time: 02/14/20  3:31 AM   Result Value Ref Range    WBC 15.3 (H) 4.0 - 11.0 10e9/L    RBC Count 4.72 4.4 - 5.9 10e12/L    Hemoglobin 13.5 13.3 - 17.7 g/dL    Hematocrit 40.9 40.0 - 53.0 %    MCV 87 78 - 100 fl    MCH 28.6 26.5 - 33.0 pg    MCHC 33.0 31.5 - 36.5 g/dL    RDW 18.8 (H) 10.0 - 15.0 %    Platelet Count 92 (L) 150 - 450 10e9/L   Phosphorus    Collection Time: 02/14/20  3:31 AM   Result Value Ref Range    Phosphorus 5.1 (H) 2.5 - 4.5 mg/dL   Magnesium    Collection Time: 02/14/20  3:31 AM   Result Value Ref Range    Magnesium 2.3 1.6 - 2.3 mg/dL   Blood gas arterial with oxyhemoglobin (PCU Collect TBD)    Collection Time: 02/14/20  3:31 AM   Result Value Ref Range    pH Arterial 7.37 7.35 - 7.45 pH    pCO2 Arterial 45 35 - 45 mm Hg    pO2 Arterial 128 (H) 80 - 105 mm Hg    Bicarbonate Arterial 26 21 - 28 mmol/L    FIO2 3L     Oxyhemoglobin Arterial 98 92 - 100 %    Base Excess Art 0.3 mmol/L   Calcium ionized whole blood    Collection Time: 02/14/20  3:31 AM   Result Value Ref Range    Calcium Ionized Whole Blood 4.5 4.4 - 5.2 mg/dL   Blood gas venous with oxyhemoglobin (PCU Collect TBD)    Collection Time: 02/14/20  3:32 AM    Result Value Ref Range    Ph Venous 7.33 7.32 - 7.43 pH    PCO2 Venous 39 (L) 40 - 50 mm Hg    PO2 Venous 37 25 - 47 mm Hg    Bicarbonate Venous 20 (L) 21 - 28 mmol/L    FIO2 3L     Oxyhemoglobin Venous 61 %    Base Deficit Venous 5.1 mmol/L   Blood gas venous with oxyhemoglobin (PCU Collect TBD)    Collection Time: 20  7:20 AM   Result Value Ref Range    Ph Venous 7.33 7.32 - 7.43 pH    PCO2 Venous 40 40 - 50 mm Hg    PO2 Venous 39 25 - 47 mm Hg    Bicarbonate Venous 21 21 - 28 mmol/L    FIO2 21     Oxyhemoglobin Venous 65 %    Base Deficit Venous 4.5 mmol/L   Glucose by meter    Collection Time: 20  7:26 AM   Result Value Ref Range    Glucose 75 70 - 99 mg/dL   Blood gas venous with oxyhemoglobin (PCU Collect TBD)    Collection Time: 20  8:38 AM   Result Value Ref Range    Ph Venous 7.36 7.32 - 7.43 pH    PCO2 Venous 50 40 - 50 mm Hg    PO2 Venous 33 25 - 47 mm Hg    Bicarbonate Venous 28 21 - 28 mmol/L    FIO2 21     Oxyhemoglobin Venous 54 %    Base Excess Venous 1.5 mmol/L   Lactic acid whole blood    Collection Time: 20  8:38 AM   Result Value Ref Range    Lactic Acid 1.0 0.7 - 2.0 mmol/L       Recent Results (from the past 24 hour(s))   Echocardiogram Complete    Narrative    [id number]  [id number]  [id number]  [id number]^MATT^NAHUN^JIM           [facility]  Echocardiography Laboratory  [address] [place] [id number]     Name: WOLFGANG LEACH  MRN: [id number]  : 1955  Study Date: 2020 10:06 AM  Age: 64 yrs  Gender: Male  Patient Location: [facility]  Reason For Study: Heart Failure  Ordering Physician: NAHUN GUTIERREZ  Referring Physician: SELF, REFERRED  Performed By: Yvonne Adan RDCS     BSA: 2.2 m2  Height: 67 in  Weight: 244 lb  HR: 103  BP: 72/52 mmHg  _____________________________________________________________________________  __        Procedure  Complete Portable Echo Adult. Contrast Optison. Optison (NDC  #6999-7250-31)  given intravenously. Patient was given 6 ml mixture of 3 ml Optison and 6 ml  saline. 3 ml wasted.  _____________________________________________________________________________  __        Interpretation Summary  Left ventricular size is normal.  LVEF 20% based on biplane 2D tracing.  Mild to moderate right ventricular dilation is present.  Global right ventricular function is moderately reduced.  Pulmonary artery systolic pressure is normal.  Dilation of the inferior vena cava is present with abnormal respiratory  variation in diameter.  _____________________________________________________________________________  __        Left Ventricle  Left ventricular size is normal. LVEF 20% based on biplane 2D tracing. Left  ventricular wall thickness is normal. Diastolic function not assessed due to  frequent ectopy. Abnormal septal motion consistent with left bundle branch  block is present.     Right Ventricle  Mild to moderate right ventricular dilation is present. Global right  ventricular function is moderately reduced.     Atria  Mild biatrial enlargement is present.     Mitral Valve  Moderate mitral insufficiency is present.        Aortic Valve  Aortic valve is normal in structure and function.     Tricuspid Valve  Moderate tricuspid insufficiency is present. The right ventricular systolic  pressure is approximated at 24.4 mmHg plus the right atrial pressure.  Pulmonary artery systolic pressure is normal.     Pulmonic Valve  The pulmonic valve cannot be assessed. Mild pulmonic insufficiency is present.     Vessels  The aorta root is normal. The pulmonary artery cannot be assessed. Dilation of  the inferior vena cava is present with abnormal respiratory variation in  diameter.     Compared to Previous Study  Previous study not available for comparison.     _____________________________________________________________________________  __  MMode/2D Measurements & Calculations  IVSd: 0.61 cm  LVIDd: 4.7  cm  LVIDs: 3.7 cm  LVPWd: 0.75 cm  FS: 21.7 %  LV mass(C)d: 99.1 grams  LV mass(C)dI: 45.0 grams/m2     Ao root diam: 2.9 cm  asc Aorta Diam: 2.5 cm  LVOT diam: 1.9 cm  LVOT area: 2.8 cm2     EF(MOD-bp): 21.5 %  LA Volume (BP): 84.8 ml  LA Volume Index (BP): 38.5 ml/m2  RWT: 0.32        Doppler Measurements & Calculations  PA acc time: 0.09 sec     PI end-d saumya: 154.0 cm/sec  TR max saumya: 247.0 cm/sec  TR max P.4 mmHg     _____________________________________________________________________________  __           Report approved by: Graciela Tomlinson 2020 12:05 PM      XR Chest Port 1 View    Narrative    XR CHEST PORT 1 VW  2020 4:21 PM      HISTORY: SG Placement    COMPARISON: None available    FINDINGS: Single AP chest radiograph. Ehrhardt-Chucky catheter tip is in the  proximal right main pulmonary artery. Right central line tip and right  upper extremity PICC line tip at the lower SVC. Trachea is midline,  cardiomegaly. Bilateral perihilar streaky opacities. Mild increased  interstitial opacities in the right lung with ill-defined pulmonary  vascularity. No pneumothorax or pleural effusion. No acute osseous or  abdominal abnormality. Elevated left hemidiaphragm.      Impression    IMPRESSION:  1. Ehrhardt-Chucky catheter tip at the proximal right main pulmonary artery.  2. Cardiomegaly with mild pulmonary edema, especially on the right.    I have personally reviewed the examination and initial interpretation  and I agree with the findings.    DONG SOLORIO MD   Cardiac Catheterization    Narrative      Successful insertion of a IABP in the RFA                Medications     Current Facility-Administered Medications   Medication     acetaminophen (TYLENOL) tablet 650 mg     albuterol (PROAIR HFA/PROVENTIL HFA/VENTOLIN HFA) 108 (90 Base) MCG/ACT inhaler 2 puff     allopurinol (ZYLOPRIM) tablet 200 mg     amiodarone (PACERONE/CODARONE) tablet 400 mg     aspirin (ASA) chewable tablet 81 mg     atorvastatin  (LIPITOR) tablet 20 mg     calcium carbonate (TUMS) chewable tablet 500 mg     calcium chloride 1 g in sodium chloride 0.9 % 100 mL intermittent infusion     calcium chloride 2 g in sodium chloride 0.9 % 100 mL intermittent infusion     calcium chloride 3 g in sodium chloride 0.9 % 200 mL intermittent infusion     calcium chloride 4 g in sodium chloride 0.9 % 200 mL intermittent infusion     calcium gluconate in D5W 100 mL intermittent infusion 2 g     co-enzyme Q-10 capsule 200 mg     glucose gel 15-30 g    Or     dextrose 50 % injection 25-50 mL    Or     glucagon injection 1 mg     dialysate for CVVHD & CVVHDF (Phoxillum BK4/2.5)     diclofenac (VOLTAREN) 1 % topical gel 4 g     DOBUTamine (DOBUTREX) 1,000 mg in D5W 250 mL infusion (adult max conc)     DOPamine 800 mg in dextrose 5% 250 mL (adult max conc) - premix     fentaNYL (PF) (SUBLIMAZE) injection 25 mcg     FLUoxetine (PROzac) capsule 20 mg     heparin  drip 25,000 units in 0.45% NaCl 250 mL  ANTICOAGULANT  (see additional administration details for dose)     heparin bolus from infusion pump     insulin aspart (NovoLOG) inj (RAPID ACTING)     insulin aspart (NovoLOG) inj (RAPID ACTING)     insulin glargine (LANTUS PEN) injection 10 Units     lidocaine (LMX4) cream     lidocaine 1 % 0.1-1 mL     magnesium oxide (MAG-OX) tablet 400 mg     magnesium sulfate 2 g in water intermittent infusion     medication instruction     melatonin tablet 3 mg     meropenem (MERREM) 1 g vial to attach to  mL bag     naloxone (NARCAN) injection 0.1-0.4 mg     nitroGLYcerin 50 mg in D5W 250 mL (adult std) infusion     No heparin required     omeprazole (priLOSEC) CR capsule 20 mg     POST-filter replacement solution for CVVHD & CVVHDF (Phoxillum BK4/2.5)     potassium chloride 20 mEq in 50 mL intermittent infusion     PRE-filter replacement solution for CVVHD & CVVHDF (Phoxillum BK4/2.5)     sodium chloride (PF) 0.9% PF flush 3 mL     sodium chloride (PF) 0.9% PF flush 3  mL     sodium phosphate 20 mmol in D5W 100 mL intermittent infusion     vancomycin (VANCOCIN) 2,000 mg in sodium chloride 0.9 % 250 mL intermittent infusion     vitamin B1 (THIAMINE) tablet 100 mg

## 2020-02-14 NOTE — PLAN OF CARE
Major Shift Events: increased dopamine for low cardiac output, autodiuresing  Plan: continue to monitor and notify MD of changes  For vital signs and complete assessments, please see documentation flowsheets.

## 2020-02-14 NOTE — PROGRESS NOTES
"CRRT STATUS NOTE    DATA:  Time:  5:28 PM  Pressures WNL:  YES  Filter Status:  WDL    Problems Reported/Alarms Noted:  None reported    Supplies Present:  YES    ASSESSMENT:  Patient Net Fluid Balance:    Intake/Output Summary (Last 24 hours) at 2/14/2020 1728  Last data filed at 2/14/2020 1700  Gross per 24 hour   Intake 3052.26 ml   Output 4433 ml   Net -1380.74 ml       Vital Signs:  BP (!) 86/58   Pulse 102   Temp 97.3  F (36.3  C) (Axillary)   Resp 18   Ht 1.702 m (5' 7\")   Wt 102.5 kg (225 lb 15.5 oz)   SpO2 100%   BMI 35.39 kg/m      Labs:    Lab Results   Component Value Date    WBC 13.5 02/14/2020     Lab Results   Component Value Date    RBC 4.69 02/14/2020     Lab Results   Component Value Date    HGB 13.2 02/14/2020     Lab Results   Component Value Date    HCT 40.9 02/14/2020     No components found for: MCT  Lab Results   Component Value Date    MCV 87 02/14/2020     Lab Results   Component Value Date    MCH 28.1 02/14/2020     Lab Results   Component Value Date    MCHC 32.3 02/14/2020     Lab Results   Component Value Date    RDW 18.8 02/14/2020     Lab Results   Component Value Date     02/14/2020     Last Comprehensive Metabolic Panel:  Sodium   Date Value Ref Range Status   02/14/2020 134 133 - 144 mmol/L Final     Potassium   Date Value Ref Range Status   02/14/2020 3.6 3.4 - 5.3 mmol/L Final     Chloride   Date Value Ref Range Status   02/14/2020 101 94 - 109 mmol/L Final     Carbon Dioxide   Date Value Ref Range Status   02/14/2020 28 20 - 32 mmol/L Final     Anion Gap   Date Value Ref Range Status   02/14/2020 5 3 - 14 mmol/L Final     Glucose   Date Value Ref Range Status   02/14/2020 115 (H) 70 - 99 mg/dL Final     Urea Nitrogen   Date Value Ref Range Status   02/14/2020 29 7 - 30 mg/dL Final     Creatinine   Date Value Ref Range Status   02/14/2020 1.42 (H) 0.66 - 1.25 mg/dL Final     GFR Estimate   Date Value Ref Range Status   02/14/2020 51 (L) >60 mL/min/[1.73_m2] Final     " Comment:     Non  GFR Calc  Starting 12/18/2018, serum creatinine based estimated GFR (eGFR) will be   calculated using the Chronic Kidney Disease Epidemiology Collaboration   (CKD-EPI) equation.       Calcium   Date Value Ref Range Status   02/14/2020 8.9 8.5 - 10.1 mg/dL Final       Goals of Therapy:  0-50cc/hr net negative as BP's allow.    INTERVENTIONS:   Checked in with bedside RN.    PLAN:  Continue with treatment goals. Call CRRT RN at [phone] with questions and concerns.

## 2020-02-14 NOTE — PROGRESS NOTES
Earlier today, a new IABP catheter was placed in the left femoral artery (8 fr, 50 ml balloon). The old catheter was removed from the right femoral artery site.

## 2020-02-14 NOTE — PHARMACY-AMINOGLYCOSIDE DOSING SERVICE
Pharmacy Aminoglycoside Follow-Up Note  Date of Service 2020  Patient's  1955   64 year old, male    Weight (Adjusted): 81 kg    Indication: Bacteremia  Current Tobramycin regimen:  intermittent  Day of therapy: 2    Target goals based on conventional dosing  Goal Peak level: 10-12 mg/L  Goal Trough level: </= 1 mg/L    Current estimated CrCl: Estimated Creatinine Clearance: 53.2 mL/min (A) (based on SCr of 1.56 mg/dL (H)).    Creatinine for last 3 days  2020:  4:40 AM Creatinine 1.35 mg/dL;  4:01 PM Creatinine 1.23 mg/dL  2020:  2:06 AM Creatinine 1.58 mg/dL;  6:37 AM Creatinine 1.72 mg/dL; 12:17 PM Creatinine 2.09 mg/dL;  4:59 PM Creatinine 2.21 mg/dL;  9:47 PM Creatinine 2.31 mg/dL  2020:  3:31 AM Creatinine 1.86 mg/dL; 10:58 AM Creatinine 1.56 mg/dL    Nephrotoxins and other renal medications (From now, onward)    Start     Dose/Rate Route Frequency Ordered Stop    20 0800  vancomycin (VANCOCIN) 2,000 mg in sodium chloride 0.9 % 250 mL intermittent infusion      2,000 mg (central catheter)  over 60 Minutes Intravenous EVERY 24 HOURS 20 0720      20 1800  DOBUTamine (DOBUTREX) 1,000 mg in D5W 250 mL infusion (adult max conc)      2.5-7.5 mcg/kg/min × 108.9 kg (Dosing Weight)  4.1-12.3 mL/hr  Intravenous CONTINUOUS 20            Contrast Orders - past 72 hours (72h ago, onward)    None          Aminoglycoside Levels - past 2 days  2020:  9:47 PM Tobramycin Level 13.0 mg/L    Aminoglycosides IV Administrations (past 72 hours)                   tobramycin (NEBCIN) 325 mg in sodium chloride 0.9 % intermittent infusion (mg) 325 mg New Bag 20                  Interpretation of levels and current regimen:  Aminoglycoside levels are within goal range    Renal function: CRRT    Plan  1. Therapy discontinued    Ina Adan RPH

## 2020-02-14 NOTE — PHARMACY-AMINOGLYCOSIDE DOSING SERVICE
Pharmacy Aminoglycoside Initial Note  Date of Service 2020  Patient's  1955  64 year old, male    Weight (Adjusted):  81 kg    Indication: Bacteremia  Microbiology:   2020 0353 2020 Blood culture [[id number]]   (Abnormal)   Blood   Left Hand    Preliminary result Specimen Description Blood Left Hand   Culture Micro Cultured on the 1st day of incubation:   Gram negative rods   Abnormal  P   Culture Micro Critical Value/Significant Value, preliminary result only, called to and read back by   Mima Cabrera RN. @1426. 20. .   P   Culture Micro Cultured on the 1st day of incubation:   Gram positive cocci in pairs   Abnormal  P   Culture Micro Critical Value/Significant Value, preliminary result only, called to and read back by   Alisson Castillo RN on 20 at 1728.  JRT   P   Culture Micro (Note)   POSITIVE for ENTEROCOCCUS FAECALIS and NEGATIVE for Ernst/vanB genes   by Verigene multiplex nucleic acid test. Final identification and   antimicrobial susceptibility testing will be verified by standard   methods.     Specimen tested with Verigene multiplex, gram-positive blood culture   nucleic acid test for the following targets: Staph aureus, Staph   epidermidis, Staph lugdunensis, other Staph species, Enterococcus   faecalis, Enterococcus faecium, Streptococcus species, S. agalactiae,   S. anginosus grp., S. pneumoniae, S. pyogenes, Listeria sp., mecA   (methicillin resistance) and Ernst/B (vancomycin resistance).     Critical Value/Significant Value called to and read back by MARIA EUGENIA MILLAN RN 20 EH       P           2020 0350 2020 Blood culture [[id number]]   (Abnormal)   Blood   Right Hand    Preliminary result Specimen Description Blood Right Hand   Culture Micro Cultured on the 1st day of incubation:   Gram negative rods   Abnormal  P   Culture Micro Critical Value/Significant Value, preliminary result only, called to and read back by   Mima  KRISTEN Cabrera. @1426. 2.13.20. BS.   P   Culture Micro Cultured on the 1st day of incubation:   Gram positive cocci in pairs   Abnormal  P   Culture Micro Critical Value/Significant Value, preliminary result only, called to and read back by   Alisson Leon RN on 2.13.20 at 1728.  JRT   P   Culture Micro (Note)   POSITIVE for PROTEUS SPECIES by Verigene multiplex nucleic acid test.   Final identification and antimicrobial susceptibility testing will be   verified by standard methods.     Specimen tested with Verigene multiplex, gram-negative blood culture   nucleic acid test for the following targets: Acinetobacter sp.,   Citrobacter sp., Enterobacter sp., Proteus sp., E. coli, K.   pneumoniae/oxytoca, P. aeruginosa, and the following resistance   markers: CTXM, KPC, NDM, VIM, IMP and OXA.     Critical Value/Significant Value called to and read back by   Alisson Leon RN @ 1447. 2/13/20. AV   P          Current estimated CrCl = Estimated Creatinine Clearance: 37.7 mL/min (A) (based on SCr of 2.21 mg/dL (H)).    Creatinine for last 3 days  2/11/2020:  4:24 AM Creatinine 1.35 mg/dL;  2:47 PM Creatinine 1.15 mg/dL  2/12/2020:  4:40 AM Creatinine 1.35 mg/dL;  4:01 PM Creatinine 1.23 mg/dL  2/13/2020:  2:06 AM Creatinine 1.58 mg/dL;  6:37 AM Creatinine 1.72 mg/dL; 12:17 PM Creatinine 2.09 mg/dL;  4:59 PM Creatinine 2.21 mg/dL     Nephrotoxins and other renal medications (From now, onward)    Start     Dose/Rate Route Frequency Ordered Stop    02/14/20 0500  vancomycin (VANCOCIN) 2,000 mg in sodium chloride 0.9 % 250 mL intermittent infusion      2,000 mg (central catheter)  over 60 Minutes Intravenous EVERY 24 HOURS 02/13/20 0747      02/13/20 1800  tobramycin (NEBCIN) 325 mg in sodium chloride 0.9 % intermittent infusion      325 mg  over 60 Minutes Intravenous ONCE 02/13/20 1725      02/13/20 1724  tobramycin (NEBCIN) place cheng - receiving intermittent dosing      1 each Injection SEE ADMIN INSTRUCTIONS  02/13/20 1725      02/13/20 1430  bumetanide (BUMEX) injection 4 mg      4 mg Intravenous ONCE 02/13/20 1426      02/13/20 0530  vasopressin (VASOSTRICT) 40 Units in D5W 40 mL infusion      0.5-4 Units/hr  0.5-4 mL/hr  Intravenous CONTINUOUS 02/13/20 0522      02/13/20 0445  norepinephrine (LEVOPHED) 16 mg in  mL infusion      0.01-0.4 mcg/kg/min × 108.9 kg (Dosing Weight)  1-40.8 mL/hr  Intravenous CONTINUOUS 02/13/20 0434      02/13/20 0330  piperacillin-tazobactam (ZOSYN) 4.5 g vial to attach to  mL bag      4.5 g  over 30 Minutes Intravenous EVERY 6 HOURS 02/13/20 0301      02/08/20 1800  DOBUTamine (DOBUTREX) 1,000 mg in D5W 250 mL infusion (adult max conc)      2.5-7.5 mcg/kg/min × 108.9 kg (Dosing Weight)  4.1-12.3 mL/hr  Intravenous CONTINUOUS 02/08/20 1757            Contrast Orders - past 72 hours (72h ago, onward)    None          Aminoglycoside Levels - past 2 days  No results found for requested labs within last 48 hours.    Aminoglycosides IV Administrations (past 72 hours)                   tobramycin (NEBCIN) 325 mg in sodium chloride 0.9 % intermittent infusion (mg) 325 mg New Bag 02/13/20 2027                    Plan:  1.  Start Tobramycin 325 mg (4 mg/kg) IV once, then intermittent .   2.  Target goals based on conventional dosing  3.  Goal peak level: 10-12 mg/L  4.  Goal trough level: </= 1 mg/L  5.  Will check peak and trough       Kari Alvarez, PharmD  [facility] Pharmacist  [phone]  [phone]

## 2020-02-14 NOTE — PROGRESS NOTES
CRRT INITIATION NOTE    Consent for CRRT Completed:  YES  Patient s Vascular Access: Catheter              Placement Confirmed: YES  Manufacture:  [employer]  Model:  [id number]  Length/Gibraltarian Size:  16cm x 12f  Flush Volume:  1.5mL    DATA:  Procedure:  CVVHDF  Start Time:  2247  Machine#:  [id number]  Filter:  M150  Blood Flow:  200  ML/min  Pre-Replacement Solution:  Phoxillum BK4/2.5  Post-Replacement Solution:  Phoxillum BK4/2.5  Dialysate Solution:  Phoxillum BK4/2.5  Pre-Replacement Solution Rate:  1400 mL/hr  Post-Replacement Solution Rate:  1400 mL/hr  Dialysate Flow Rate:  200 mL/hr   Patient Removal Rate:  100 mL/hr    ASSESSMENT:  How Patient Tolerated Initiation:  Well  Vital Signs:  HR: 78; BP: 122/50 (84); SPO2 97%   Initial Pressures:  Access:  -95  Filter:  196  Effluent: 196  Return:  65  TMP:  74  Change in Filter Pressure:  48      INTERVENTIONS:  New filter primed and tested. Access cleaned per protocol. CRRT initiated per orders.     PLAN:  Continue fluid removal as tolerated per goals of therapy.  Check filter daily.  Change filter q 72 hrs and PRN.  Please contact the CRRT resource RN at [phone] with any questions/concerns.

## 2020-02-14 NOTE — PROGRESS NOTES
Care Coordinator Progress Note    Admission Date/Time:  2/6/2020  Attending MD:  Nader Cisneros MD    Data  Chart reviewed, discussed with interdisciplinary team.   Patient was admitted for:    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)    Concerns with insurance coverage for discharge needs: None.  Current Living Situation: Patient lives with spouse.  Support System: Supportive and Involved  Services Involved: None     Assessment  Pt transferred from OSH for evaluation and management of advanced heart failure with arrhythmia.  Pt is in ICU with IABP and CRRT.   Pt has been evaluated for OHT or LVAD.  Met with pt and dtr to introduce self and for support.  RNCC role discussed and contact info provided.  Pt and dtr stated the team have been updating them well about the plan of care.  Pt dtr stated she lives about 9 hrs from the hospital and came today to visit her dad.  RNCC will cont to follow plan of care.     Plan  Anticipated Discharge Date:  TBD.  Anticipated Discharge Plan:   TBD.  RNCC will cont to follow plan of care.      Brady Medley RN, PHN, BSN  4A and 4E/ ICU  Care Coordinator  Phone: [phone]  Pager: [phone]

## 2020-02-14 NOTE — PROGRESS NOTES
[facility]  General Infectious Disease Progress Note     Patient:  Eliseo Tanner, Date of birth 1955, Medical record number [id number]  Date of Visit:  February 14, 2020         Assessment and Recommendations:   Problem List:  Bacteremia with Proteus and enterococcus on 2/13  Vfib arrest  Chronic systolic heart failure with exacerbation  NICM  Type 2 diabetes  CKD 3    Impression:   Mr Eliseo Tanner is a 65yo M with a history of chronic systolic heart failure, NICM, moderate CAD, HTN, DM2, CKDIII who was admitted to [facility] on 2/7/20 for evaluation of heart failure. Prior to arrival to the floor he was in Vfib and required shocks x 3. He was being diuresed as expected, and has been requiring inotropes and a balloon pump. On 2/13 morning had rigors and chills. Blood cultures now growing Proteus and enterococcus. I suspect this is most likely from his lines. Lines all removed and changed on 2/13 evening. Patient received a dose of tobramycin on 2/13 evening.     For proteus would transition Zosyn to Meropenem (to avoid ampC inducible resistance). Will await sensitivities. Continue vanc for enterococus while waiting sensitivities. Please obtain repeat blood cultures tomorrow morning. Would obtain Cdiff testing given his diarrhea.      Recommendations:  1. Continue Vancomycin  2. Stop Zosyn  3. Start Meropenem  4. Would not dose tobramycin again unless newly hemodynamically unstable due to infection  5. Please repeat blood cultures tomorrow am    ID will follow.        Attestation:  I have reviewed today's vital signs, medications, labs and imaging.  Marie Siddiqui MD  Pager [phone]          Interval History:       Patient had lines changed out yesterday. He feels well this am. No further chills or rigors. Team gave the patient 1 dose of tobramycin.              Review of Systems:   CONSTITUTIONAL:  No fevers or chills  EYES: negative for icterus  ENT:  negative for oral  lesions, hearing loss, tinnitus and sore throat  RESPIRATORY:  negative for cough and dyspnea  CARDIOVASCULAR:  negative for chest pain, palpitations  GASTROINTESTINAL:  negative for nausea, vomiting, diarrhea and constipation  GENITOURINARY:  negative for dysuria  HEME:  No easy bruising/bleeding  INTEGUMENT:  negative for rash and pruritus  NEURO:  Negative for headache         Current Antimicrobials     Vanc/Zosyn/Tobramycin x1       Physical Exam:   Ranges for vital signs:  Temp:  [96.6  F (35.9  C)-98.4  F (36.9  C)] 96.6  F (35.9  C)  Heart Rate:  [] 84  Resp:  [14-26] 20  MAP:  [60 mmHg-110 mmHg] 82 mmHg  Arterial Line BP: ()/(37-97) 125/52  SpO2:  [92 %-100 %] 96 %      Exam:  GENERAL:  well-developed, well-nourished, in no acute distress.   ENT:  Head is normocephalic, atraumatic. Oropharynx is moist without exudates or ulcers.  EYES:  Eyes have anicteric sclerae. PERRL.   NECK:  Supple. No cervical lymphadenopathy  LUNGS:  Clear to auscultation bilaterally. No wheezes or crackles.  CARDIOVASCULAR:  Regular rate and rhythm with no murmurs, gallops or rubs.  ABDOMEN:  Normal bowel sounds, soft, nontender. No hepatosplenomegaly.   EXT: Extremities warm and without edema.  SKIN:  No acute rashes.   NEUROLOGIC:  Grossly nonfocal.    ACCESS: Balloon pump present, L internal jugular, R A-line         Laboratory Data:     Creatinine   Date Value Ref Range Status   02/14/2020 1.86 (H) 0.66 - 1.25 mg/dL Final   02/13/2020 2.31 (H) 0.66 - 1.25 mg/dL Final   02/13/2020 2.21 (H) 0.66 - 1.25 mg/dL Final   02/13/2020 2.09 (H) 0.66 - 1.25 mg/dL Final   02/13/2020 1.72 (H) 0.66 - 1.25 mg/dL Final     WBC   Date Value Ref Range Status   02/14/2020 15.3 (H) 4.0 - 11.0 10e9/L Final   02/13/2020 15.9 (H) 4.0 - 11.0 10e9/L Final   02/13/2020 1.9 (L) 4.0 - 11.0 10e9/L Final   02/12/2020 4.8 4.0 - 11.0 10e9/L Final   02/12/2020 6.0 4.0 - 11.0 10e9/L Final     Hemoglobin   Date Value Ref Range Status   02/14/2020 13.5  13.3 - 17.7 g/dL Final     Platelet Count   Date Value Ref Range Status   02/14/2020 92 (L) 150 - 450 10e9/L Final     CRP Inflammation   Date Value Ref Range Status   02/08/2020 21.0 (H) 0.0 - 8.0 mg/L Final     AST   Date Value Ref Range Status   02/14/2020 107 (H) 0 - 45 U/L Final   02/13/2020 83 (H) 0 - 45 U/L Final   02/13/2020 40 0 - 45 U/L Final   02/08/2020 78 (H) 0 - 45 U/L Final   02/07/2020 49 (H) 0 - 45 U/L Final     ALT   Date Value Ref Range Status   02/14/2020 83 (H) 0 - 70 U/L Final   02/13/2020 58 0 - 70 U/L Final   02/13/2020 38 0 - 70 U/L Final   02/08/2020 74 (H) 0 - 70 U/L Final   02/07/2020 43 0 - 70 U/L Final     Bilirubin Total   Date Value Ref Range Status   02/14/2020 1.4 (H) 0.2 - 1.3 mg/dL Final   02/13/2020 1.8 (H) 0.2 - 1.3 mg/dL Final   02/13/2020 0.8 0.2 - 1.3 mg/dL Final   02/08/2020 0.7 0.2 - 1.3 mg/dL Final   02/07/2020 0.9 0.2 - 1.3 mg/dL Final     Lab Results   Component Value Date     02/14/2020    BUN 39 (H) 02/14/2020    CO2 28 02/14/2020       Culture data:  2/14 Blood culture pending  2/13 Blood culture: GNRs, GPCs in pairs; Proteus, sensis pending  2/13 Urine culture negative    All cultures:  Recent Labs   Lab 02/14/20  0919 02/13/20  0353 02/13/20  0350 02/13/20  0331   CULT PENDING Cultured on the 1st day of incubation:  Gram negative rods  *  Critical Value/Significant Value, preliminary result only, called to and read back by  Mima Cabrera RN. @1426. 2.13.20. BS.     Cultured on the 1st day of incubation:  Gram positive cocci in pairs  *  Critical Value/Significant Value, preliminary result only, called to and read back by  Alisson Castillo RN on 2.13.20 at 1728.  JRT    (Note)  POSITIVE for ENTEROCOCCUS FAECALIS and NEGATIVE for Ernst/vanB genes  by Attentive.ly multiplex nucleic acid test. Final identification and  antimicrobial susceptibility testing will be verified by standard  methods.    Specimen tested with Attentive.ly multiplex, gram-positive blood  culture  nucleic acid test for the following targets: Staph aureus, Staph  epidermidis, Staph lugdunensis, other Staph species, Enterococcus  faecalis, Enterococcus faecium, Streptococcus species, S. agalactiae,  S. anginosus grp., S. pneumoniae, S. pyogenes, Listeria sp., mecA  (methicillin resistance) and Ernst/B (vancomycin resistance).    Critical Value/Significant Value called to and read back by MARIA EUGENIA MILLAN RN 2/13/20 2036 EH       Cultured on the 1st day of incubation:  Proteus mirabilis  *  Critical Value/Significant Value, preliminary result only, called to and read back by  Mima Cabrera RN. @1426. 2.13.20. BS.     Cultured on the 1st day of incubation:  Gram positive cocci in pairs  *  Critical Value/Significant Value, preliminary result only, called to and read back by  Alisson Leon RN on 2.13.20 at 1728.  JRT    (Note)  POSITIVE for PROTEUS SPECIES by Inflection multiplex nucleic acid test.  Final identification and antimicrobial susceptibility testing will be  verified by standard methods.    Specimen tested with Verigene multiplex, gram-negative blood culture  nucleic acid test for the following targets: Acinetobacter sp.,  Citrobacter sp., Enterobacter sp., Proteus sp., E. coli, K.  pneumoniae/oxytoca, P. aeruginosa, and the following resistance  markers: CTXM, KPC, NDM, VIM, IMP and OXA.    Critical Value/Significant Value called to and read back by  Alisson Leon RN @ 1650. 2/13/20. AV   No growth

## 2020-02-14 NOTE — CONSULTS
Nephrology Initial Consult  February 13, 2020      Eliseo Tanner MRN:[id number] YOB: 1955  Date of Admission:2/6/2020  Primary care provider: Jay Steele  Requesting physician: Nader Cisneros MD    ASSESSMENT AND RECOMMENDATIONS:     Mr Eliseo Tanner is a 63yo M with  H/O  Stage D Class IV HF,NICM( LVEF 15-20 %) moderate CAD, HTN, DM2, CKD, admitted to [facility] on 2/7/20 with Cardiogenic shock and V fib with multiple ICD shocks. Patient is not responding to diuresis , on pressure support . Developed rigors and there is concern for septic shock .  Nephrology consulted for BERNARDA     Oliguric BERNARDA on CKD stage 3 ( Baseline Cr 1.5- 2)   Gradually worsening Cr. On admission 1.73, now 2.21  Etiology : Cardiorenal and also sepsis could be contributing   Continue Diuresis per cardiology team - however obligate input is quite high  And despite diuretics he may not be able to match output with diuretics alone while he is on pressors  Hence we will start CRRT with target UF 0-25 ml/hr , provided MAP > 6O    BP/volume  MAP 71 , /46, on Dobutamine, Dopamine, vasopressin   Current CVP 20 ,Wedge is 24   1+ pedal edema   -- clinical picture consistent with hypervolemia . Initiating CRRT   Indications, possible complications including but not limited to infections, bleeding, fatal arrhythmias explained to patient. Different forms of RRT explained to him . We will choose one of them based on patient's clinical status . Patient verbalized understanding and agreeable to proceed with RRT. Patient signed written consent   -- continue Diuresis per cardiology ; got Diuril ( total 1500 mg IV today),Lasix  80 mg x1 dose . He also got Bumex 4 mg x1 dose IV today .     Hb  14.5  - wnl    Acid Base,Electrolytes :   Na    - Mild hypervolemic hyponatremia secondary to heart failure  K      - Normal  CO2 - 26, pH 7.41 .    Patient has lactic acidosis with abnormal LFT in addition to worsening renal function - suggestive of  multiorgan failure 2/2 cardiogenic shock and possible sepsis . Started on vancomycin, zosyn    Recommendations were communicated to primary team via note and verbally with cardiology team   Patient discussed with Dr Paul Hayden MD, FACP  Nephrology Fellow   [facility]   Pager [phone]      REASON FOR CONSULT: BERNARDA on CKD    HISTORY OF PRESENT ILLNESS:  Admitting provider and nursing notes reviewed  Eliseo Tanner is a 64 year old with  H/O  Stage D Class IV HF,NICM( LVEF 15-20 %) moderate CAD, HTN, DM2, CKD, admitted to [facility] on 2/7/20 with Cardiogenic shock and V fib with multiple ICD shocks. Patient is not responding to diuresis , on pressure support . Developed rigors and there is concern for septic shock .  Nephrology consulted for BERNARDA     He has underlying CKD likely from T2DM and HTN . Follows with Dr Jimbo Vila, Nephrologist  [facility] in [place], [place] . Review of his note shows that patient has CKD stage 3 ( baseline Cr 1.3 - 1.5) with Non nephrotic range proteinuria , MGUS, Chronic gout, HTN.    Patient not diuresing well despite aggressive diuretics  Blood cultures growing GNR, GPC  Concern for line infection   Taken to cath lab and had following procedures :  Placement of left femoral artery IABP with removal of right femoral artery IABP.  Placement of right internal jugular [place] catheter.   LIJ catheter removed.  Placement of left internal jugular hemodialysis catheter. Portable CXR confirmed placement, no pneumothorax as per my read.  Placement of right radial artery arterial line.    He is now back from procedure  Has SOB but no CP  Has some dry cough   Has  Some abd cramping at times but no N/V/ had some diarrhea yesterday per patient , resolved       PAST MEDICAL HISTORY:  Reviewed with patient on 02/13/2020     No past medical history on file.    Past Surgical History:   Procedure Laterality Date     CV INTRA-AORTIC BALLOON PUMP INSERTION N/A 2/7/2020     Procedure: Intra-Aortic Balloon Pump Insertion;  Surgeon: Jose Baldwin MD;  Location:  HEART CARDIAC CATH LAB        MEDICATIONS:  PTA Meds  Prior to Admission medications    Medication Sig Last Dose Taking? Auth Provider   acetaminophen (TYLENOL) 500 MG tablet Take 1,000 mg by mouth daily as needed   Reported, Patient   albuterol (PROAIR HFA/PROVENTIL HFA/VENTOLIN HFA) 108 (90 Base) MCG/ACT inhaler Inhale 2 puffs into the lungs every 4 hours as needed   Reported, Patient   allopurinol (ZYLOPRIM) 100 MG tablet Take 200 mg by mouth daily   Reported, Patient   aspirin 81 MG EC tablet Take 81 mg by mouth daily   Reported, Patient   atorvastatin (LIPITOR) 20 MG tablet Take 20 mg by mouth daily   Reported, Patient   coenzyme Q-10 200 MG CAPS Take 1 capsule by mouth daily   Reported, Patient   FLUoxetine (PROZAC) 20 MG capsule Take 20 mg by mouth daily   Reported, Patient   furosemide (LASIX) 40 MG tablet Take 40 mg by mouth daily   Reported, Patient   glipiZIDE (GLUCOTROL XL) 2.5 MG 24 hr tablet Take 2.5 mg by mouth daily   Reported, Patient   insulin glargine (BASAGLAR KWIKPEN) 100 UNIT/ML pen Inject 15 Units Subcutaneous At Bedtime   Reported, Patient   insulin pen needle (BD JAIME U/F) 32G X 4 MM miscellaneous    Reported, Patient   magnesium oxide (MAG-OX) 400 MG tablet Take 400 mg by mouth daily   Reported, Patient   MULTIPLE MINERALS-VITAMINS PO Take 1 tablet by mouth daily   Reported, Patient   omeprazole (PRILOSEC) 20 MG DR capsule Take 20 mg by mouth every morning   Reported, Patient   ondansetron (ZOFRAN) 4 MG tablet Take 4 mg by mouth every 4 hours as needed for nausea   Reported, Patient   sacubitril-valsartan (ENTRESTO)  MG per tablet Take 1 tablet by mouth 2 times daily   Reported, Patient   spironolactone (ALDACTONE) 25 MG tablet Take 25 mg by mouth daily   Reported, Patient      Current Meds    allopurinol  200 mg Oral Daily     amiodarone  400 mg Oral BID     aspirin  81 mg Oral Daily      atorvastatin  20 mg Oral QPM     bumetanide  4 mg Intravenous Once     chlorothiazide  1,000 mg Intravenous Once     co-enzyme Q-10  200 mg Oral Daily     diclofenac  4 g Topical 4x Daily     FLUoxetine  20 mg Oral Daily     insulin aspart  1-7 Units Subcutaneous TID AC     insulin aspart  1-5 Units Subcutaneous At Bedtime     insulin glargine  10 Units Subcutaneous At Bedtime     magnesium oxide  400 mg Oral Daily     omeprazole  20 mg Oral QAM AC     piperacillin-tazobactam  4.5 g Intravenous Q6H     sodium chloride (PF)  3 mL Intracatheter Q8H     tobramycin  325 mg Intravenous Once     tobramycin (NEBCIN) place cheng - receiving intermittent dosing  1 each Injection See Admin Instructions     [START ON 2/14/2020] vancomycin (VANCOCIN) IV  2,000 mg (central catheter) Intravenous Q24H     vitamin B1  100 mg Oral Daily     Infusion Meds    DOBUTamine 7.5 mcg/kg/min (02/13/20 1700)     DOPamine 3 mcg/kg/min (02/13/20 1700)     HEParin Stopped (02/13/20 1801)     - MEDICATION INSTRUCTIONS -       norepinephrine Stopped (02/13/20 0601)     vasopressin (PITRESSIN) infusion ADULT (40 mL) 0.5 Units/hr (02/13/20 1700)       ALLERGIES:    Allergies   Allergen Reactions     Oxycodone Itching and Other (See Comments)       REVIEW OF SYSTEMS:  A comprehensive of systems was negative except as noted above.    SOCIAL HISTORY:   Social History     Socioeconomic History     Marital status:      Spouse name: Not on file     Number of children: Not on file     Years of education: Not on file     Highest education level: Not on file   Occupational History     Not on file   Social Needs     Financial resource strain: Not on file     Food insecurity:     Worry: Not on file     Inability: Not on file     Transportation needs:     Medical: Not on file     Non-medical: Not on file   Tobacco Use     Smoking status: Not on file   Substance and Sexual Activity     Alcohol use: Not on file     Drug use: Not on file     Sexual  "activity: Not on file   Lifestyle     Physical activity:     Days per week: Not on file     Minutes per session: Not on file     Stress: Not on file   Relationships     Social connections:     Talks on phone: Not on file     Gets together: Not on file     Attends Yarsani service: Not on file     Active member of club or organization: Not on file     Attends meetings of clubs or organizations: Not on file     Relationship status: Not on file     Intimate partner violence:     Fear of current or ex partner: Not on file     Emotionally abused: Not on file     Physically abused: Not on file     Forced sexual activity: Not on file   Other Topics Concern     Not on file   Social History Narrative     Not on file     Reviewed with patient       FAMILY MEDICAL HISTORY:   No family history on file.  Reviewed with patient     PHYSICAL EXAM:   Temp  Av.1  F (36.7  C)  Min: 97  F (36.1  C)  Max: 101.5  F (38.6  C)  Arterial Line BP  Min: 55/45  Max: 159/68  Arterial Line MAP (mmHg)  Av.5 mmHg  Min: 46 mmHg  Max: 109 mmHg      Pulse  Av  Min: 73  Max: 110 Resp  Av.9  Min: 9  Max: 47  SpO2  Av.8 %  Min: 84 %  Max: 100 %    CVP (mmHg): 19 mmHg  BP (!) 86/58   Pulse 102   Temp 97.8  F (36.6  C) (Oral)   Resp 20   Ht 1.702 m (5' 7\")   Wt 103.5 kg (228 lb 2.8 oz)   SpO2 98%   BMI 35.74 kg/m     Date 20 0700 - 20 0659   Shift 7634-5567 3826-1835 7444-2295 24 Hour Total   INTAKE   P.O. 340   340   I.V. 490.04 218.47  708.51   Shift Total(mL/kg) 830.04(8.02) 218.47(2.11)  1048.51(10.13)   OUTPUT   Urine 54 84  138   Emesis/NG output  350  350   Shift Total(mL/kg) 54(0.52) 434(4.19)  488(4.72)   Weight (kg) 103.5 103.5 103.5 103.5      Admit Weight: 108.9 kg (240 lb 1.3 oz)     GENERAL APPEARANCE: no distress, he is  awake  EYES: no scleral icterus, pupils equal  Endo: no goiter, no moon facies  Lymphatics: no cervical or supraclavicular LAD  Pulmonary: lungs clear to auscultation with equal " breath sounds bilaterally, no clubbing  CV: regular rhythm, normal rate, no rub   - JVD no -   Edema 1+ pedal edema   GI: soft, nontender, normal bowel sounds  MS: no evidence of inflammation in joints, no muscle tenderness  : has  lombardo  SKIN: no rash, warm, dry, no cyanosis  NEURO: face symmetric, no asterixis     LABS:   CMP  Recent Labs   Lab 02/13/20  1659 02/13/20  1217 02/13/20  0637 02/13/20  0206  02/12/20  0440  02/10/20  0256  02/09/20  1143  02/08/20  0408  02/07/20  0511   * 131* 132* 132*   < > 136   < > 135   < >  --    < > 134   < > 136   POTASSIUM 4.4 4.7 4.0 4.1  4.2   < > 3.6   < > 3.9   < > 3.8   < > 3.5   < > 4.2   CHLORIDE 94 96 93* 96   < > 96   < > 102   < >  --    < > 103   < > 108   CO2 26 22 29 22   < > 32   < > 28   < >  --    < > 23   < > 17*   ANIONGAP 11 13 10 14   < > 9   < > 6   < >  --    < > 8   < > 11   * 262* 169* 154*   < > 168*   < > 157*   < >  --    < > 155*   < > 193*   BUN 42* 38* 35* 34*   < > 31*   < > 40*   < >  --    < > 50*   < > 39*   CR 2.21* 2.09* 1.72* 1.58*   < > 1.35*   < > 1.57*   < >  --    < > 2.81*   < > 1.94*   GFRESTIMATED 30* 32* 40* 45*   < > 54*   < > 45*   < >  --    < > 22*   < > 35*   GFRESTBLACK 35* 37* 47* 52*   < > 63   < > 52*   < >  --    < > 26*   < > 40*   CALOS 8.3* 8.2* 8.9 8.9   < > 8.4*   < > 8.5   < >  --    < > 8.2*   < > 8.3*   MAG  --   --  2.3 2.0  --  1.3*  --  2.1   < > 1.5*  --   --   --   --    PHOS  --   --   --  5.4*  --  3.1  --  3.7  --  4.5  --  5.0*  --   --    PROTTOTAL  --  7.3  --  7.4  --   --   --   --   --   --   --  7.4  --  7.0   ALBUMIN  --  2.7*  --  3.0*  --   --   --   --   --   --   --  3.1*  --  2.9*   BILITOTAL  --  1.8*  --  0.8  --   --   --   --   --   --   --  0.7  --  0.9   ALKPHOS  --  177*  --  150  --   --   --   --   --   --   --  157*  --  156*   AST  --  83*  --  40  --   --   --   --   --   --   --  78*  --  49*   ALT  --  58  --  38  --   --   --   --   --   --   --  74*  --  43    <  > = values in this interval not displayed.     CBC  Recent Labs   Lab 02/13/20  1030 02/13/20  0206 02/12/20  1601 02/12/20  0440   HGB 14.5 13.3 12.4* 12.5*   WBC 15.9* 1.9* 4.8 6.0   RBC 5.11 4.80 4.41 4.40   HCT 44.5 42.2 37.8* 38.4*   MCV 87 88 86 87   MCH 28.4 27.7 28.1 28.4   MCHC 32.6 31.5 32.8 32.6   RDW 18.6* 18.6* 18.2* 18.1*   PLT 88* 92* 100* 120*     INR  Recent Labs   Lab 02/13/20  0206 02/12/20  0440 02/11/20  1858 02/08/20  0408 02/06/20  2333   INR 1.55*  --   --  1.33* 1.32*   * 69* 57* 32  --      ABG  Recent Labs   Lab 02/13/20  1452 02/13/20  1331 02/13/20  1154 02/13/20  0948  02/13/20  0206  02/06/20  2253   PH  --   --   --   --   --  7.41  --  7.39   PCO2  --   --   --   --   --  33*  --  29*   PO2  --   --   --   --   --  124*  --  76*   HCO3  --   --   --   --   --  21  --  18*   O2PER 21 21 21 3L   < > 3.0L   < > 21  21    < > = values in this interval not displayed.      URINE STUDIES  Recent Labs   Lab Test 02/13/20  0334 02/07/20 2029   COLOR Light Brown Yellow   APPEARANCE Cloudy Slightly Cloudy   URINEGLC 30* Negative   URINEBILI Negative Negative   URINEKETONE Negative Negative   SG 1.016 1.019   UBLD Large* Negative   URINEPH 6.0 5.0   PROTEIN 300* 30*   NITRITE Negative Negative   LEUKEST Small* Negative   RBCU >182* 7*   WBCU 81* 3     No lab results found.  PTH  No lab results found.  IRON STUDIES  Recent Labs   Lab Test 02/08/20  0408   IRON 25*      IRONSAT 8*   HEAVENLY 189       IMAGING:  Pertinent imaging reviewed       Catracho Back MD

## 2020-02-14 NOTE — CONSULTS
Dental Service Consultation        Eliseo Tanner MRN# [id number]   YOB: 1955 Age: 64 year old   Date of Admission: 2/6/2020     Reason for consult: I was asked by Dr. Stanford Acuna to evaluate this patient for dental optimization as a part of LVAD/heart transplant work up.           Assessment and Plan:   Assessment: Patient exhibits no dental pathology clinically that would be considered a risk for LVAD placement. Dental CT is negative for lesions that would indicate an increased dental risk prior to LVAD placement.     Plan: Patient is dentally optimized for LVAD placement.             Chief Complaint:   Patient denies any dental pain or discomfort. Patient claims he has a couple small cavities.    History is obtained from the patient         History of Present Illness:   This patient is a 64 year old male admitted to [facility] with a past medical history of chronic systolic heart failure, NICM, CAD, HTN, ABHINAV, DM type II, and CKD stage III. Patient denies seeing a dentist regularly. Denies any dental pain or discomfort.               Past Medical History:   No past medical history on file.          Past Surgical History:     Past Surgical History:   Procedure Laterality Date     CV INTRA-AORTIC BALLOON PUMP INSERTION N/A 2/7/2020    Procedure: Intra-Aortic Balloon Pump Insertion;  Surgeon: Jose Baldwin MD;  Location:  HEART CARDIAC CATH LAB               Social History:     Social History     Tobacco Use     Smoking status: Not on file   Substance Use Topics     Alcohol use: Not on file             Family History:   No family history on file.          Immunizations:     There is no immunization history on file for this patient.          Allergies:     Allergies   Allergen Reactions     Oxycodone Itching and Other (See Comments)             Medications:     Current Facility-Administered Medications Ordered in Epic   Medication Dose Route Frequency Last Rate Last Dose     acetaminophen  (TYLENOL) tablet 650 mg  650 mg Oral Q4H PRN   650 mg at 02/13/20 1632     albuterol (PROAIR HFA/PROVENTIL HFA/VENTOLIN HFA) 108 (90 Base) MCG/ACT inhaler 2 puff  2 puff Inhalation Q6H PRN         allopurinol (ZYLOPRIM) tablet 200 mg  200 mg Oral Daily   200 mg at 02/13/20 0752     amiodarone (PACERONE/CODARONE) tablet 400 mg  400 mg Oral BID   400 mg at 02/13/20 0752     aspirin (ASA) chewable tablet 81 mg  81 mg Oral Daily   81 mg at 02/13/20 0753     atorvastatin (LIPITOR) tablet 20 mg  20 mg Oral QPM   20 mg at 02/12/20 2122     bumetanide (BUMEX) injection 4 mg  4 mg Intravenous Once   Stopped at 02/13/20 1439     calcium carbonate (TUMS) chewable tablet 500 mg  500 mg Oral Daily PRN   500 mg at 02/07/20 1314     chlorothiazide (DIURIL) intermittent infusion 1,000 mg  1,000 mg Intravenous Once   Stopped at 02/13/20 1440     co-enzyme Q-10 capsule 200 mg  200 mg Oral Daily   200 mg at 02/13/20 0754     glucose gel 15-30 g  15-30 g Oral Q15 Min PRN        Or     dextrose 50 % injection 25-50 mL  25-50 mL Intravenous Q15 Min PRN        Or     glucagon injection 1 mg  1 mg Subcutaneous Q15 Min PRN         diclofenac (VOLTAREN) 1 % topical gel 4 g  4 g Topical 4x Daily   4 g at 02/12/20 1542     DOBUTamine (DOBUTREX) 1,000 mg in D5W 250 mL infusion (adult max conc)  2.5-7.5 mcg/kg/min (Dosing Weight) Intravenous Continuous 12.3 mL/hr at 02/13/20 1700 7.5 mcg/kg/min at 02/13/20 1700     DOPamine 800 mg in dextrose 5% 250 mL (adult max conc) - premix  1-20 mcg/kg/min (Dosing Weight) Intravenous Continuous 6.1 mL/hr at 02/13/20 1700 3 mcg/kg/min at 02/13/20 1700     fentaNYL (PF) (SUBLIMAZE) injection 25 mcg  25 mcg Intravenous Q1H PRN   25 mcg at 02/07/20 1350     FLUoxetine (PROzac) capsule 20 mg  20 mg Oral Daily   20 mg at 02/13/20 0752     heparin  drip 25,000 units in 0.45% NaCl 250 mL  ANTICOAGULANT  (see additional administration details for dose)  0-3,500 Units/hr Intravenous Continuous   Stopped at 02/13/20  1801     heparin bolus from infusion pump   Intravenous Q6H PRN         insulin aspart (NovoLOG) inj (RAPID ACTING)  1-7 Units Subcutaneous TID AC   2 Units at 02/13/20 1152     insulin aspart (NovoLOG) inj (RAPID ACTING)  1-5 Units Subcutaneous At Bedtime   1 Units at 02/08/20 2137     insulin glargine (LANTUS PEN) injection 10 Units  10 Units Subcutaneous At Bedtime   10 Units at 02/12/20 2125     lidocaine (LMX4) cream   Topical Q1H PRN         lidocaine 1 % 0.1-1 mL  0.1-1 mL Other Q1H PRN         lidocaine 1 %    Once PRN   10 mL at 02/13/20 1811     magnesium oxide (MAG-OX) tablet 400 mg  400 mg Oral Daily   400 mg at 02/13/20 0753     magnesium sulfate 2 g in water intermittent infusion  2 g Intravenous Daily PRN   2 g at 02/13/20 0238     magnesium sulfate 4 g in 100 mL sterile water (premade)  4 g Intravenous Q4H PRN   4 g at 02/12/20 0633     medication instruction   Does not apply Continuous PRN         melatonin tablet 3 mg  3 mg Oral At Bedtime PRN   3 mg at 02/12/20 2138     naloxone (NARCAN) injection 0.1-0.4 mg  0.1-0.4 mg Intravenous Q2 Min PRN         norepinephrine (LEVOPHED) 16 mg in  mL infusion  0.01-0.4 mcg/kg/min (Dosing Weight) Intravenous Continuous   Stopped at 02/13/20 0601     omeprazole (priLOSEC) CR capsule 20 mg  20 mg Oral QAM AC   20 mg at 02/13/20 0752     piperacillin-tazobactam (ZOSYN) 4.5 g vial to attach to  mL bag  4.5 g Intravenous Q6H   4.5 g at 02/13/20 1632     potassium chloride (KLOR-CON) Packet 20-40 mEq  20-40 mEq Oral or Feeding Tube Q2H PRN         potassium chloride 10 mEq in 100 mL intermittent infusion with 10 mg lidocaine  10 mEq Intravenous Q1H PRN         potassium chloride 10 mEq in 100 mL sterile water intermittent infusion (premix)  10 mEq Intravenous Q1H PRN         potassium chloride 20 mEq in 50 mL intermittent infusion  20 mEq Intravenous Q1H PRN         potassium chloride ER (K-DUR/KLOR-CON M) CR tablet 20-40 mEq  20-40 mEq Oral Q2H PRN   40  mEq at 02/13/20 0155     potassium phosphate 10 mmol in D5W 250 mL intermittent infusion  10 mmol Intravenous Daily PRN         potassium phosphate 15 mmol in D5W 250 mL intermittent infusion  15 mmol Intravenous Daily PRN         potassium phosphate 20 mmol in D5W 250 mL intermittent infusion  20 mmol Intravenous Q6H PRN         potassium phosphate 20 mmol in D5W 500 mL intermittent infusion  20 mmol Intravenous Q6H PRN         potassium phosphate 25 mmol in D5W 500 mL intermittent infusion  25 mmol Intravenous Q8H PRN         sodium chloride (PF) 0.9% PF flush 3 mL  3 mL Intracatheter q1 min prn         sodium chloride (PF) 0.9% PF flush 3 mL  3 mL Intracatheter Q8H   3 mL at 02/12/20 0842     tobramycin (NEBCIN) 325 mg in sodium chloride 0.9 % intermittent infusion  325 mg Intravenous Once         tobramycin (NEBCIN) place cheng - receiving intermittent dosing  1 each Injection See Admin Instructions         [START ON 2/14/2020] vancomycin (VANCOCIN) 2,000 mg in sodium chloride 0.9 % 250 mL intermittent infusion  2,000 mg (central catheter) Intravenous Q24H         vasopressin (VASOSTRICT) 40 Units in D5W 40 mL infusion  0.5-4 Units/hr Intravenous Continuous 0.5 mL/hr at 02/13/20 1700 0.5 Units/hr at 02/13/20 1700     vitamin B1 (THIAMINE) tablet 100 mg  100 mg Oral Daily   100 mg at 02/13/20 0752     No current [facility]-ordered outpatient medications on file.             Review of Systems:   The 4 point Review of Systems is negative other than noted in the HPI            Physical Exam:   Vitals were reviewed  Temp: 97.8  F (36.6  C) Temp src: Oral    Heart Rate: 96 Resp: 20 SpO2: 98 % O2 Device: Nasal cannula Oxygen Delivery: 3 LPM    Head and neck exam: Negative for swelling, erythema, lymphadenopathy, asymmetry, or pain on palpation.    Intraoral exam: Negative for soft tissue lesions. No carious lesions observable clinically. Patient denies pain on percussion to all teeth.        Data:   Radiographic  interpretation: Dental CT taken on 2/10/2020 interpreted  Osseous pathology: None  Pulpal Pathology: None  Periodontal Pathology: None  Caries: No carious lesions identifiable   Odontogenic pathology: Impacted third molar    The patient was discussed with: Dr. Chata Jiang, Dr. Reilly Little, Dr. Stanford Medel  PGY1  Pager: [phone]

## 2020-02-14 NOTE — PLAN OF CARE
Transplant Teaching:  Today Mr Tanner's daughter and son-in-law arrived from their home in [place], and I met with them to do transplant teaching.  She was given a copy of the UNOS brochure on Multiple Waitlisting and the [facility] booklets including current program statistics as published in January 2020.  We discussed the evaluation process, insurance prior authorization, UNOS priority categories, the waiting period, and the pre-, keyla-, and post-op periods.  We also reviewed the post-hospital clinic and biopsy routines, the major potential risks of transplant including rejection, transplant vasculopathy, infection, and malignancy, and briefly review the antirejection medication regime.      Eliseo's daughter reports that she has 2 brothers also, but also live ~ 9hrs away.  She and her brothers are having discussions with their mother regarding who all will be part of Mr Tanner's caregiver team.  She was provided with a number to the Transplant Office, and encouraged to call if they have questions.

## 2020-02-14 NOTE — PLAN OF CARE
Major Shift Events:  -Patient transferred back to  after exchanging balloon pump and placing new lines d/t bacteremia  -Femoral arterial sheath removed by Dr. Lewis with no complications  -CRRT initiated to help facilitate fluid removal d/t low UOP  -UOP has gradually increased over course of shift; Bumex and Diuril given x2  -Hemodynamic numbers improving; see flowsheets    Plan: Continue to monitor  For vital signs and complete assessments, please see documentation flowsheets.

## 2020-02-14 NOTE — PROGRESS NOTES
"SPIRITUAL HEALTH SERVICES  SPIRITUAL ASSESSMENT Progress Note  [facility] 4E     Visited with pt, who shared that \"yesterday was a pretty hard day, but I'm better today. They had to take out and replace of number of my lines. And now it seems the other side of my heart isn't doing well. It could possibly get to the point where I'm not able to get an LVAD but would just be listed for transplant, but we still don't know that.\"  Pt said his spouse is back home for a bit: \"she'll be coming back here on the weekend.\" Pt said his  called and he feels good support from him. Prayer was welcomed today.    PLAN: continue to follow, visiting at least weekly while pt on unit.    Ad Mccoy) Hernandez Krause M.Div., [facility]  Staff   Pager [phone]      "

## 2020-02-15 ENCOUNTER — APPOINTMENT (OUTPATIENT)
Dept: GENERAL RADIOLOGY | Facility: CLINIC | Age: 65
DRG: 001 | End: 2020-02-15
Attending: STUDENT IN AN ORGANIZED HEALTH CARE EDUCATION/TRAINING PROGRAM
Payer: MEDICARE

## 2020-02-15 LAB
ABO + RH BLD: NORMAL
ABO + RH BLD: NORMAL
ALBUMIN SERPL-MCNC: 2.5 G/DL (ref 3.4–5)
ALBUMIN SERPL-MCNC: 2.8 G/DL (ref 3.4–5)
ALP SERPL-CCNC: 121 U/L (ref 40–150)
ALP SERPL-CCNC: 146 U/L (ref 40–150)
ALT SERPL W P-5'-P-CCNC: 47 U/L (ref 0–70)
ALT SERPL W P-5'-P-CCNC: 82 U/L (ref 0–70)
ANION GAP SERPL CALCULATED.3IONS-SCNC: 2 MMOL/L (ref 3–14)
ANION GAP SERPL CALCULATED.3IONS-SCNC: 3 MMOL/L (ref 3–14)
ANION GAP SERPL CALCULATED.3IONS-SCNC: 8 MMOL/L (ref 3–14)
ANION GAP SERPL CALCULATED.3IONS-SCNC: 8 MMOL/L (ref 3–14)
APTT PPP: 45 SEC (ref 22–37)
AST SERPL W P-5'-P-CCNC: 43 U/L (ref 0–45)
AST SERPL W P-5'-P-CCNC: 82 U/L (ref 0–45)
BASE DEFICIT BLDV-SCNC: 0.3 MMOL/L
BASE DEFICIT BLDV-SCNC: 0.7 MMOL/L
BASE EXCESS BLDA CALC-SCNC: 2.2 MMOL/L
BASE EXCESS BLDV CALC-SCNC: 0.1 MMOL/L
BASE EXCESS BLDV CALC-SCNC: 0.4 MMOL/L
BASE EXCESS BLDV CALC-SCNC: 0.9 MMOL/L
BASE EXCESS BLDV CALC-SCNC: 1 MMOL/L
BASE EXCESS BLDV CALC-SCNC: 1.3 MMOL/L
BASE EXCESS BLDV CALC-SCNC: 2.5 MMOL/L
BILIRUB SERPL-MCNC: 0.6 MG/DL (ref 0.2–1.3)
BILIRUB SERPL-MCNC: 0.8 MG/DL (ref 0.2–1.3)
BLD GP AB SCN SERPL QL: NORMAL
BLD PROD DISPENSED VOL BPU: 3100 ML
BLD PROD DISPENSED VOL BPU: 3100 ML
BLD PROD TYP BPU: NORMAL
BLD UNIT ID BPU: 0
BLOOD BANK CMNT PATIENT-IMP: NORMAL
BLOOD PRODUCT CODE: NORMAL
BPU ID: NORMAL
BUN SERPL-MCNC: 24 MG/DL (ref 7–30)
BUN SERPL-MCNC: 27 MG/DL (ref 7–30)
BUN SERPL-MCNC: 28 MG/DL (ref 7–30)
BUN SERPL-MCNC: 33 MG/DL (ref 7–30)
CA-I BLD-MCNC: 4.5 MG/DL (ref 4.4–5.2)
CA-I BLD-MCNC: 4.9 MG/DL (ref 4.4–5.2)
CA-I BLD-MCNC: 5.2 MG/DL (ref 4.4–5.2)
CA-I SERPL ISE-MCNC: 3.9 MG/DL (ref 4.4–5.2)
CA-I SERPL ISE-MCNC: 4 MG/DL (ref 4.4–5.2)
CA-I SERPL ISE-MCNC: 4.3 MG/DL (ref 4.4–5.2)
CA-I SERPL ISE-MCNC: 5.1 MG/DL (ref 4.4–5.2)
CALCIUM SERPL-MCNC: 10 MG/DL (ref 8.5–10.1)
CALCIUM SERPL-MCNC: 11.3 MG/DL (ref 8.5–10.1)
CALCIUM SERPL-MCNC: 8.5 MG/DL (ref 8.5–10.1)
CALCIUM SERPL-MCNC: 8.8 MG/DL (ref 8.5–10.1)
CHLORIDE SERPL-SCNC: 101 MMOL/L (ref 94–109)
CHLORIDE SERPL-SCNC: 103 MMOL/L (ref 94–109)
CHLORIDE SERPL-SCNC: 103 MMOL/L (ref 94–109)
CHLORIDE SERPL-SCNC: 105 MMOL/L (ref 94–109)
CO2 SERPL-SCNC: 24 MMOL/L (ref 20–32)
CO2 SERPL-SCNC: 26 MMOL/L (ref 20–32)
CO2 SERPL-SCNC: 28 MMOL/L (ref 20–32)
CO2 SERPL-SCNC: 31 MMOL/L (ref 20–32)
CREAT SERPL-MCNC: 1.16 MG/DL (ref 0.66–1.25)
CREAT SERPL-MCNC: 1.25 MG/DL (ref 0.66–1.25)
CREAT SERPL-MCNC: 1.34 MG/DL (ref 0.66–1.25)
CREAT SERPL-MCNC: 1.48 MG/DL (ref 0.66–1.25)
ERYTHROCYTE [DISTWIDTH] IN BLOOD BY AUTOMATED COUNT: 18.7 % (ref 10–15)
ERYTHROCYTE [DISTWIDTH] IN BLOOD BY AUTOMATED COUNT: 18.9 % (ref 10–15)
GFR SERPL CREATININE-BSD FRML MDRD: 48 ML/MIN/{1.73_M2}
GFR SERPL CREATININE-BSD FRML MDRD: 55 ML/MIN/{1.73_M2}
GFR SERPL CREATININE-BSD FRML MDRD: 59 ML/MIN/{1.73_M2}
GFR SERPL CREATININE-BSD FRML MDRD: 65 ML/MIN/{1.73_M2}
GLUCOSE BLDC GLUCOMTR-MCNC: 105 MG/DL (ref 70–99)
GLUCOSE BLDC GLUCOMTR-MCNC: 150 MG/DL (ref 70–99)
GLUCOSE BLDC GLUCOMTR-MCNC: 152 MG/DL (ref 70–99)
GLUCOSE BLDC GLUCOMTR-MCNC: 202 MG/DL (ref 70–99)
GLUCOSE BLDC GLUCOMTR-MCNC: 226 MG/DL (ref 70–99)
GLUCOSE SERPL-MCNC: 116 MG/DL (ref 70–99)
GLUCOSE SERPL-MCNC: 118 MG/DL (ref 70–99)
GLUCOSE SERPL-MCNC: 224 MG/DL (ref 70–99)
GLUCOSE SERPL-MCNC: 241 MG/DL (ref 70–99)
HCO3 BLD-SCNC: 28 MMOL/L (ref 21–28)
HCO3 BLDV-SCNC: 26 MMOL/L (ref 21–28)
HCO3 BLDV-SCNC: 27 MMOL/L (ref 21–28)
HCO3 BLDV-SCNC: 27 MMOL/L (ref 21–28)
HCO3 BLDV-SCNC: 28 MMOL/L (ref 21–28)
HCO3 BLDV-SCNC: 28 MMOL/L (ref 21–28)
HCT VFR BLD AUTO: 42.6 % (ref 40–53)
HCT VFR BLD AUTO: 43.5 % (ref 40–53)
HGB BLD-MCNC: 13.2 G/DL (ref 13.3–17.7)
HGB BLD-MCNC: 13.2 G/DL (ref 13.3–17.7)
HGB BLD-MCNC: 13.6 G/DL (ref 13.3–17.7)
INR PPP: 1.31 (ref 0.86–1.14)
LACTATE BLD-SCNC: 0.8 MMOL/L (ref 0.7–2)
LACTATE BLD-SCNC: 1.3 MMOL/L (ref 0.7–2)
MAGNESIUM SERPL-MCNC: 2.2 MG/DL (ref 1.6–2.3)
MCH RBC QN AUTO: 27.8 PG (ref 26.5–33)
MCH RBC QN AUTO: 28 PG (ref 26.5–33)
MCHC RBC AUTO-ENTMCNC: 31 G/DL (ref 31.5–36.5)
MCHC RBC AUTO-ENTMCNC: 31.3 G/DL (ref 31.5–36.5)
MCV RBC AUTO: 89 FL (ref 78–100)
MCV RBC AUTO: 90 FL (ref 78–100)
NUM BPU REQUESTED: 10
NUM BPU REQUESTED: 11
O2/TOTAL GAS SETTING VFR VENT: 21 %
O2/TOTAL GAS SETTING VFR VENT: 3 %
O2/TOTAL GAS SETTING VFR VENT: NORMAL %
OXYHGB MFR BLD: 90 % (ref 92–100)
OXYHGB MFR BLDV: 37 %
OXYHGB MFR BLDV: 42 %
OXYHGB MFR BLDV: 42 %
OXYHGB MFR BLDV: 46 %
OXYHGB MFR BLDV: 49 %
OXYHGB MFR BLDV: 52 %
OXYHGB MFR BLDV: 56 %
OXYHGB MFR BLDV: 57 %
PCO2 BLD: 45 MM HG (ref 35–45)
PCO2 BLDV: 43 MM HG (ref 40–50)
PCO2 BLDV: 46 MM HG (ref 40–50)
PCO2 BLDV: 46 MM HG (ref 40–50)
PCO2 BLDV: 47 MM HG (ref 40–50)
PCO2 BLDV: 47 MM HG (ref 40–50)
PCO2 BLDV: 50 MM HG (ref 40–50)
PCO2 BLDV: 52 MM HG (ref 40–50)
PCO2 BLDV: 52 MM HG (ref 40–50)
PETH BLD-MCNC: NEGATIVE NG/ML
PH BLD: 7.4 PH (ref 7.35–7.45)
PH BLDV: 7.33 PH (ref 7.32–7.43)
PH BLDV: 7.33 PH (ref 7.32–7.43)
PH BLDV: 7.34 PH (ref 7.32–7.43)
PH BLDV: 7.35 PH (ref 7.32–7.43)
PH BLDV: 7.36 PH (ref 7.32–7.43)
PH BLDV: 7.37 PH (ref 7.32–7.43)
PH BLDV: 7.39 PH (ref 7.32–7.43)
PH BLDV: 7.4 PH (ref 7.32–7.43)
PHOSPHATE SERPL-MCNC: 3.8 MG/DL (ref 2.5–4.5)
PHOSPHATE SERPL-MCNC: 4 MG/DL (ref 2.5–4.5)
PHOSPHATE SERPL-MCNC: 4.1 MG/DL (ref 2.5–4.5)
PLATELET # BLD AUTO: 118 10E9/L (ref 150–450)
PLATELET # BLD AUTO: 126 10E9/L (ref 150–450)
PO2 BLD: 63 MM HG (ref 80–105)
PO2 BLDV: 24 MM HG (ref 25–47)
PO2 BLDV: 26 MM HG (ref 25–47)
PO2 BLDV: 26 MM HG (ref 25–47)
PO2 BLDV: 28 MM HG (ref 25–47)
PO2 BLDV: 29 MM HG (ref 25–47)
PO2 BLDV: 32 MM HG (ref 25–47)
PO2 BLDV: 33 MM HG (ref 25–47)
PO2 BLDV: 34 MM HG (ref 25–47)
POTASSIUM SERPL-SCNC: 3.7 MMOL/L (ref 3.4–5.3)
POTASSIUM SERPL-SCNC: 3.8 MMOL/L (ref 3.4–5.3)
POTASSIUM SERPL-SCNC: 3.9 MMOL/L (ref 3.4–5.3)
POTASSIUM SERPL-SCNC: 4.1 MMOL/L (ref 3.4–5.3)
PROT SERPL-MCNC: 6.5 G/DL (ref 6.8–8.8)
PROT SERPL-MCNC: 7 G/DL (ref 6.8–8.8)
RBC # BLD AUTO: 4.72 10E12/L (ref 4.4–5.9)
RBC # BLD AUTO: 4.9 10E12/L (ref 4.4–5.9)
SODIUM SERPL-SCNC: 135 MMOL/L (ref 133–144)
SODIUM SERPL-SCNC: 135 MMOL/L (ref 133–144)
SODIUM SERPL-SCNC: 136 MMOL/L (ref 133–144)
SODIUM SERPL-SCNC: 136 MMOL/L (ref 133–144)
SPECIMEN EXP DATE BLD: NORMAL
TRANSFUSION STATUS PATIENT QL: NORMAL
WBC # BLD AUTO: 12.2 10E9/L (ref 4–11)
WBC # BLD AUTO: 12.2 10E9/L (ref 4–11)

## 2020-02-15 PROCEDURE — 40000014 ZZH STATISTIC ARTERIAL MONITORING DAILY

## 2020-02-15 PROCEDURE — 25000128 H RX IP 250 OP 636: Performed by: STUDENT IN AN ORGANIZED HEALTH CARE EDUCATION/TRAINING PROGRAM

## 2020-02-15 PROCEDURE — 40000344 ZZHCL STATISTIC THAWING COMPONENT: Performed by: PATHOLOGY

## 2020-02-15 PROCEDURE — 99221 1ST HOSP IP/OBS SF/LOW 40: CPT | Performed by: INTERNAL MEDICINE

## 2020-02-15 PROCEDURE — 87040 BLOOD CULTURE FOR BACTERIA: CPT | Performed by: INTERNAL MEDICINE

## 2020-02-15 PROCEDURE — 36415 COLL VENOUS BLD VENIPUNCTURE: CPT | Performed by: INTERNAL MEDICINE

## 2020-02-15 PROCEDURE — 00000146 ZZHCL STATISTIC GLUCOSE BY METER IP

## 2020-02-15 PROCEDURE — 25000128 H RX IP 250 OP 636: Performed by: PATHOLOGY

## 2020-02-15 PROCEDURE — 99292 CRITICAL CARE ADDL 30 MIN: CPT | Mod: GC | Performed by: INTERNAL MEDICINE

## 2020-02-15 PROCEDURE — 25000132 ZZH RX MED GY IP 250 OP 250 PS 637: Mod: GY | Performed by: INTERNAL MEDICINE

## 2020-02-15 PROCEDURE — 25800030 ZZH RX IP 258 OP 636: Performed by: STUDENT IN AN ORGANIZED HEALTH CARE EDUCATION/TRAINING PROGRAM

## 2020-02-15 PROCEDURE — 82330 ASSAY OF CALCIUM: CPT | Performed by: INTERNAL MEDICINE

## 2020-02-15 PROCEDURE — 85027 COMPLETE CBC AUTOMATED: CPT | Performed by: INTERNAL MEDICINE

## 2020-02-15 PROCEDURE — 85610 PROTHROMBIN TIME: CPT | Performed by: INTERNAL MEDICINE

## 2020-02-15 PROCEDURE — 82330 ASSAY OF CALCIUM: CPT | Performed by: STUDENT IN AN ORGANIZED HEALTH CARE EDUCATION/TRAINING PROGRAM

## 2020-02-15 PROCEDURE — 82330 ASSAY OF CALCIUM: CPT | Performed by: PATHOLOGY

## 2020-02-15 PROCEDURE — 83605 ASSAY OF LACTIC ACID: CPT

## 2020-02-15 PROCEDURE — 25000132 ZZH RX MED GY IP 250 OP 250 PS 637: Mod: GY | Performed by: STUDENT IN AN ORGANIZED HEALTH CARE EDUCATION/TRAINING PROGRAM

## 2020-02-15 PROCEDURE — 40000196 ZZH STATISTIC RAPCV CVP MONITORING

## 2020-02-15 PROCEDURE — 85018 HEMOGLOBIN: CPT | Performed by: PATHOLOGY

## 2020-02-15 PROCEDURE — 86022 PLATELET ANTIBODIES: CPT | Performed by: INTERNAL MEDICINE

## 2020-02-15 PROCEDURE — 40000076 ZZH STATISTIC IABP MONITORING

## 2020-02-15 PROCEDURE — 82805 BLOOD GASES W/O2 SATURATION: CPT

## 2020-02-15 PROCEDURE — 40000275 ZZH STATISTIC RCP TIME EA 10 MIN

## 2020-02-15 PROCEDURE — 87186 SC STD MICRODIL/AGAR DIL: CPT | Performed by: INTERNAL MEDICINE

## 2020-02-15 PROCEDURE — 84100 ASSAY OF PHOSPHORUS: CPT | Performed by: INTERNAL MEDICINE

## 2020-02-15 PROCEDURE — 5A1D90Z PERFORMANCE OF URINARY FILTRATION, CONTINUOUS, GREATER THAN 18 HOURS PER DAY: ICD-10-PCS | Performed by: INTERNAL MEDICINE

## 2020-02-15 PROCEDURE — 25800030 ZZH RX IP 258 OP 636: Performed by: INTERNAL MEDICINE

## 2020-02-15 PROCEDURE — 83735 ASSAY OF MAGNESIUM: CPT | Performed by: INTERNAL MEDICINE

## 2020-02-15 PROCEDURE — 99291 CRITICAL CARE FIRST HOUR: CPT | Mod: GC | Performed by: INTERNAL MEDICINE

## 2020-02-15 PROCEDURE — P9059 PLASMA, FRZ BETWEEN 8-24HOUR: HCPCS | Performed by: PATHOLOGY

## 2020-02-15 PROCEDURE — 87800 DETECT AGNT MULT DNA DIREC: CPT | Performed by: INTERNAL MEDICINE

## 2020-02-15 PROCEDURE — 83605 ASSAY OF LACTIC ACID: CPT | Performed by: STUDENT IN AN ORGANIZED HEALTH CARE EDUCATION/TRAINING PROGRAM

## 2020-02-15 PROCEDURE — 25000125 ZZHC RX 250: Performed by: PATHOLOGY

## 2020-02-15 PROCEDURE — 40000048 ZZH STATISTIC DAILY SWAN MONITORING

## 2020-02-15 PROCEDURE — 82805 BLOOD GASES W/O2 SATURATION: CPT | Performed by: STUDENT IN AN ORGANIZED HEALTH CARE EDUCATION/TRAINING PROGRAM

## 2020-02-15 PROCEDURE — 25000132 ZZH RX MED GY IP 250 OP 250 PS 637: Mod: GY

## 2020-02-15 PROCEDURE — 87077 CULTURE AEROBIC IDENTIFY: CPT | Performed by: INTERNAL MEDICINE

## 2020-02-15 PROCEDURE — 25000128 H RX IP 250 OP 636: Performed by: INTERNAL MEDICINE

## 2020-02-15 PROCEDURE — 71045 X-RAY EXAM CHEST 1 VIEW: CPT

## 2020-02-15 PROCEDURE — 80053 COMPREHEN METABOLIC PANEL: CPT | Performed by: INTERNAL MEDICINE

## 2020-02-15 PROCEDURE — 36514 APHERESIS PLASMA: CPT

## 2020-02-15 PROCEDURE — 86022 PLATELET ANTIBODIES: CPT | Performed by: PATHOLOGY

## 2020-02-15 PROCEDURE — 85730 THROMBOPLASTIN TIME PARTIAL: CPT | Performed by: STUDENT IN AN ORGANIZED HEALTH CARE EDUCATION/TRAINING PROGRAM

## 2020-02-15 PROCEDURE — 20000004 ZZH R&B ICU UMMC

## 2020-02-15 PROCEDURE — 86900 BLOOD TYPING SEROLOGIC ABO: CPT | Performed by: INTERNAL MEDICINE

## 2020-02-15 PROCEDURE — 80048 BASIC METABOLIC PNL TOTAL CA: CPT | Performed by: INTERNAL MEDICINE

## 2020-02-15 PROCEDURE — 25000125 ZZHC RX 250: Performed by: INTERNAL MEDICINE

## 2020-02-15 PROCEDURE — 86850 RBC ANTIBODY SCREEN: CPT | Performed by: INTERNAL MEDICINE

## 2020-02-15 PROCEDURE — 90947 DIALYSIS REPEATED EVAL: CPT

## 2020-02-15 PROCEDURE — 82330 ASSAY OF CALCIUM: CPT

## 2020-02-15 PROCEDURE — 86901 BLOOD TYPING SEROLOGIC RH(D): CPT | Performed by: INTERNAL MEDICINE

## 2020-02-15 RX ORDER — CALCIUM GLUCONATE 100 MG/ML
AMPUL (ML) INTRAVENOUS
Status: COMPLETED | OUTPATIENT
Start: 2020-02-15 | End: 2020-02-15

## 2020-02-15 RX ORDER — DIPHENHYDRAMINE HYDROCHLORIDE 50 MG/ML
50 INJECTION INTRAMUSCULAR; INTRAVENOUS
Status: CANCELLED | OUTPATIENT
Start: 2020-02-15

## 2020-02-15 RX ORDER — BUMETANIDE 0.25 MG/ML
6 INJECTION INTRAMUSCULAR; INTRAVENOUS ONCE
Status: COMPLETED | OUTPATIENT
Start: 2020-02-15 | End: 2020-02-15

## 2020-02-15 RX ORDER — AMOXICILLIN 250 MG
1 CAPSULE ORAL 2 TIMES DAILY
Status: DISCONTINUED | OUTPATIENT
Start: 2020-02-15 | End: 2020-03-20 | Stop reason: HOSPADM

## 2020-02-15 RX ORDER — HYDRALAZINE HYDROCHLORIDE 25 MG/1
25 TABLET, FILM COATED ORAL EVERY 8 HOURS SCHEDULED
Status: DISCONTINUED | OUTPATIENT
Start: 2020-02-15 | End: 2020-02-15

## 2020-02-15 RX ORDER — BISACODYL 5 MG
5 TABLET, DELAYED RELEASE (ENTERIC COATED) ORAL DAILY PRN
Status: DISCONTINUED | OUTPATIENT
Start: 2020-02-15 | End: 2020-02-24

## 2020-02-15 RX ORDER — POLYETHYLENE GLYCOL 3350 17 G/17G
17 POWDER, FOR SOLUTION ORAL DAILY PRN
Status: DISCONTINUED | OUTPATIENT
Start: 2020-02-15 | End: 2020-03-20 | Stop reason: HOSPADM

## 2020-02-15 RX ORDER — BISACODYL 5 MG
10 TABLET, DELAYED RELEASE (ENTERIC COATED) ORAL DAILY PRN
Status: DISCONTINUED | OUTPATIENT
Start: 2020-02-15 | End: 2020-02-24

## 2020-02-15 RX ORDER — AMOXICILLIN 250 MG
2 CAPSULE ORAL 2 TIMES DAILY
Status: DISCONTINUED | OUTPATIENT
Start: 2020-02-15 | End: 2020-03-20 | Stop reason: HOSPADM

## 2020-02-15 RX ORDER — CEFTRIAXONE 2 G/1
2 INJECTION, POWDER, FOR SOLUTION INTRAMUSCULAR; INTRAVENOUS EVERY 24 HOURS
Status: DISCONTINUED | OUTPATIENT
Start: 2020-02-15 | End: 2020-02-17

## 2020-02-15 RX ORDER — AMIODARONE HYDROCHLORIDE 200 MG/1
400 TABLET ORAL DAILY
Status: DISCONTINUED | OUTPATIENT
Start: 2020-02-16 | End: 2020-02-21

## 2020-02-15 RX ORDER — BISACODYL 5 MG
15 TABLET, DELAYED RELEASE (ENTERIC COATED) ORAL DAILY PRN
Status: DISCONTINUED | OUTPATIENT
Start: 2020-02-15 | End: 2020-02-24

## 2020-02-15 RX ADMIN — CHLOROTHIAZIDE SODIUM 1000 MG: 500 INJECTION, POWDER, LYOPHILIZED, FOR SOLUTION INTRAVENOUS at 00:39

## 2020-02-15 RX ADMIN — DOPAMINE HYDROCHLORIDE 4 MCG/KG/MIN: 320 INJECTION, SOLUTION INTRAVENOUS at 07:56

## 2020-02-15 RX ADMIN — DOBUTAMINE 7.5 MCG/KG/MIN: 12.5 INJECTION, SOLUTION INTRAVENOUS at 12:26

## 2020-02-15 RX ADMIN — DICLOFENAC 4 G: 10 GEL TOPICAL at 08:00

## 2020-02-15 RX ADMIN — ATORVASTATIN CALCIUM 20 MG: 20 TABLET, FILM COATED ORAL at 19:30

## 2020-02-15 RX ADMIN — BUMETANIDE 6 MG: 0.25 INJECTION INTRAMUSCULAR; INTRAVENOUS at 00:41

## 2020-02-15 RX ADMIN — VANCOMYCIN HYDROCHLORIDE 2000 MG: 10 INJECTION, POWDER, LYOPHILIZED, FOR SOLUTION INTRAVENOUS at 08:02

## 2020-02-15 RX ADMIN — CALCIUM CHLORIDE, MAGNESIUM CHLORIDE, SODIUM CHLORIDE, SODIUM BICARBONATE, POTASSIUM CHLORIDE AND SODIUM PHOSPHATE DIBASIC DIHYDRATE 12.5 ML/KG/HR: 3.68; 3.05; 6.34; 3.09; .314; .187 INJECTION INTRAVENOUS at 00:52

## 2020-02-15 RX ADMIN — ANTICOAGULANT CITRATE DEXTROSE SOLUTION FORMULA A 819 ML: 12.25; 11; 3.65 SOLUTION INTRAVENOUS at 14:30

## 2020-02-15 RX ADMIN — ASPIRIN 81 MG CHEWABLE TABLET 81 MG: 81 TABLET CHEWABLE at 07:58

## 2020-02-15 RX ADMIN — ALLOPURINOL 200 MG: 100 TABLET ORAL at 07:58

## 2020-02-15 RX ADMIN — BIVALIRUDIN 0.02 MG/KG/HR: 250 INJECTION, POWDER, LYOPHILIZED, FOR SOLUTION INTRAVENOUS at 15:41

## 2020-02-15 RX ADMIN — ANTICOAGULANT CITRATE DEXTROSE SOLUTION FORMULA A 3 ML: 12.25; 11; 3.65 SOLUTION INTRAVENOUS at 16:33

## 2020-02-15 RX ADMIN — HYDRALAZINE HYDROCHLORIDE 25 MG: 25 TABLET, FILM COATED ORAL at 07:58

## 2020-02-15 RX ADMIN — MEROPENEM 1 G: 1 INJECTION, POWDER, FOR SOLUTION INTRAVENOUS at 09:18

## 2020-02-15 RX ADMIN — CALCIUM CHLORIDE, MAGNESIUM CHLORIDE, SODIUM CHLORIDE, SODIUM BICARBONATE, POTASSIUM CHLORIDE AND SODIUM PHOSPHATE DIBASIC DIHYDRATE 12.5 ML/KG/HR: 3.68; 3.05; 6.34; 3.09; .314; .187 INJECTION INTRAVENOUS at 03:30

## 2020-02-15 RX ADMIN — CALCIUM CHLORIDE, MAGNESIUM CHLORIDE, SODIUM CHLORIDE, SODIUM BICARBONATE, POTASSIUM CHLORIDE AND SODIUM PHOSPHATE DIBASIC DIHYDRATE 1.84 ML/KG/HR: 3.68; 3.05; 6.34; 3.09; .314; .187 INJECTION INTRAVENOUS at 03:30

## 2020-02-15 RX ADMIN — DOBUTAMINE 7.5 MCG/KG/MIN: 12.5 INJECTION, SOLUTION INTRAVENOUS at 07:57

## 2020-02-15 RX ADMIN — OMEPRAZOLE 20 MG: 20 CAPSULE, DELAYED RELEASE ORAL at 07:58

## 2020-02-15 RX ADMIN — CALCIUM CHLORIDE, MAGNESIUM CHLORIDE, SODIUM CHLORIDE, SODIUM BICARBONATE, POTASSIUM CHLORIDE AND SODIUM PHOSPHATE DIBASIC DIHYDRATE 12.5 ML/KG/HR: 3.68; 3.05; 6.34; 3.09; .314; .187 INJECTION INTRAVENOUS at 12:27

## 2020-02-15 RX ADMIN — CALCIUM CHLORIDE, MAGNESIUM CHLORIDE, SODIUM CHLORIDE, SODIUM BICARBONATE, POTASSIUM CHLORIDE AND SODIUM PHOSPHATE DIBASIC DIHYDRATE 1.84 ML/KG/HR: 3.68; 3.05; 6.34; 3.09; .314; .187 INJECTION INTRAVENOUS at 00:11

## 2020-02-15 RX ADMIN — SENNOSIDES AND DOCUSATE SODIUM 2 TABLET: 8.6; 5 TABLET ORAL at 11:20

## 2020-02-15 RX ADMIN — DOPAMINE HYDROCHLORIDE 4 MCG/KG/MIN: 320 INJECTION, SOLUTION INTRAVENOUS at 03:29

## 2020-02-15 RX ADMIN — MAGNESIUM OXIDE 400 MG: 400 TABLET ORAL at 07:58

## 2020-02-15 RX ADMIN — CEFTRIAXONE 2 G: 2 INJECTION, POWDER, FOR SOLUTION INTRAMUSCULAR; INTRAVENOUS at 17:55

## 2020-02-15 RX ADMIN — INSULIN GLARGINE 10 UNITS: 100 INJECTION, SOLUTION SUBCUTANEOUS at 21:46

## 2020-02-15 RX ADMIN — FLUOXETINE 20 MG: 20 CAPSULE ORAL at 07:58

## 2020-02-15 RX ADMIN — AMIODARONE HYDROCHLORIDE 400 MG: 200 TABLET ORAL at 07:58

## 2020-02-15 RX ADMIN — CALCIUM CHLORIDE, MAGNESIUM CHLORIDE, SODIUM CHLORIDE, SODIUM BICARBONATE, POTASSIUM CHLORIDE AND SODIUM PHOSPHATE DIBASIC DIHYDRATE 12.5 ML/KG/HR: 3.68; 3.05; 6.34; 3.09; .314; .187 INJECTION INTRAVENOUS at 07:55

## 2020-02-15 RX ADMIN — BUMETANIDE 6 MG: 0.25 INJECTION INTRAMUSCULAR; INTRAVENOUS at 18:37

## 2020-02-15 RX ADMIN — SENNOSIDES AND DOCUSATE SODIUM 2 TABLET: 8.6; 5 TABLET ORAL at 19:30

## 2020-02-15 RX ADMIN — CALCIUM GLUCONATE 8.6 G: 98 INJECTION, SOLUTION INTRAVENOUS at 14:30

## 2020-02-15 RX ADMIN — Medication 200 MG: at 07:58

## 2020-02-15 RX ADMIN — THIAMINE HCL TAB 100 MG 100 MG: 100 TAB at 07:58

## 2020-02-15 RX ADMIN — INSULIN ASPART 1 UNITS: 100 INJECTION, SOLUTION INTRAVENOUS; SUBCUTANEOUS at 21:47

## 2020-02-15 ASSESSMENT — ACTIVITIES OF DAILY LIVING (ADL)
ADLS_ACUITY_SCORE: 16
ADLS_ACUITY_SCORE: 15

## 2020-02-15 ASSESSMENT — MIFFLIN-ST. JEOR: SCORE: 1756.63

## 2020-02-15 NOTE — PLAN OF CARE
No significant events overnight. Gave 6mg bumex, 1g diurel, diuresed 200ml/hr uop.  See flow sheet for hemodynamics. Will continue to update MD with any changes per protocol.

## 2020-02-15 NOTE — PLAN OF CARE
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

## 2020-02-15 NOTE — PROGRESS NOTES
CRRT STATUS NOTE    DATA:  Time:  6:20 AM  Pressures WNL:  YES  Filter Status:  WDL    Problems Reported/Alarms Noted:  None    Supplies Present:  YES    ASSESSMENT:  Patient Net Fluid Balance:  Net -1670 ml @ 0600.  Vital Signs:  , /51, MAP 81  Labs:  K 3.9, Mg 2.2, Phos 4.0, iCa 4.9, Hgb 13.2, Plt 118  Goals of Therapy:  0-50cc/hr net negative as BP's allow    INTERVENTIONS:   Restarted 2/15 @ 0412 per MD orders. Lines in normal position and blood warmer applied.     PLAN:  Continue to monitor circuit daily and change set q72 hours or PRN for clotting/clogging. Please call CRRT RN with any questions/problems.

## 2020-02-15 NOTE — PROGRESS NOTES
Infectious Diseases Chart Note:   2/15/2020     Gram positive organism(s) from blood cultures are still being worked up, but Proteus mirabilis sensitivities have returned. At this point we could narrow antibiotic. I would recommend continuing vancomycin and changing meropenem to ceftriaxone 2g IV daily. Once we have all the information back we may be able to narrow further.     Cards II team updated.     Marlin Blanco MD  Infectious Diseases  [phone] (TextPage)

## 2020-02-15 NOTE — PROGRESS NOTES
CRRT STATUS NOTE    DATA:  Time:  10:10 AM  Pressures WNL: Yes  Filter Status: WDL, filter pressures elevating.  Problems Reported/Alarms Noted:  Air detector malfunction.  Bedside RN ran a retest and it went away.      Supplies Present:  Yes  ASSESSMENT:  Patient Net Fluid Balance:  -2941.5  Vital Signs:  Dobutamine at 7.5mcg/kg/min, Dopamine at 4mcg/kg/min MAPs 65/80s  Labs:   Creatinine: 1.25/1.16  AST: 82  ALT: 82    02/15 0000  101.8 kg (224 lb 6.9 oz)   02/14 0600  102.5 kg (225 lb 15.5 oz)   02/13 0630  103.5 kg (228 lb 2.8 oz)       Goals of Therapy:  0-50cc/hr net negative as BP's allow.    INTERVENTIONS: Rounded with the bedside RN.  CRRT discontinued.    PLAN:  Stop CRRT when plasma pheresis begins this afternoon.

## 2020-02-15 NOTE — CONSULTS
Laboratory Medicine and Pathology  Transfusion Medicine - Apheresis Consult    Eliseo Tanner MRN# [id number]   YOB: 1955 Age: 64 year old   Date of Admission: 2/6/2020     Reason for consult: Concern for HIT and imminent surgery (LVAD)           Assessment and Plan:   The patient is a 64 you male with chronic systolic heart failure, NICM, moderate CAD, HTN, ABHINAV on CPAP, DM2, CKDIII who is transferred to [facility] for evaluation and management of advanced heart failure with arrhythmia.  The patient will likely move forward with LVAD placement, and concern for HIT arose with drop in platelet count coinciding with use of heparin.  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  Serotonin release assay pending.  Team requesting series of therapeutic plasma exchanges (TPE), tentative plan for 5 TPEs.    I met with the patient and two family members to discuss the TPE procedure, including risks and benefits, and I answered their questions to the best of my ability.  The patient agreed to proceed with the plan, signing the consent form.  Because of the planned surgery, we will use plasma as the exchange fluid to maintain coagulation factors.  I discussed the risks and benefits of blood as well, and the patient agreed to proceed, signing the consent for blood products.    We will proceed with the TPE today as soon as possible.  Blood Bank is currently thawing plasma. We can use the CRRT catheter and will coordinate that with the CRRT team.  The patient plasma volume is 3,100 mL.  We will use plasma as exchange fluid, as noted above.  We will monitor the heparin antibodies using the HIT ELAINE pre- and post-TPE.         Please do not start or continue ACE inhibitors throughout the duration of a TPE series.  ACE inhibitors can elicit bradykinin-based reactions (hypotension, flushing, etc) in the setting of TPE.  Also, it is noted that the patient is receiving ceftriaxone.  Please do not administer ceftriaxone concurrent with  TPE.  Either dose ceftriaxone before of after each TPE.  While the experience is primarily in the , concurrent ceftriaxone and TPE (with calcium gluconate to offset citrate-induced hypocalcemia) can cause crystal formation and deposition in tissues.            Chief Complaint:   Transfusion Medicine/Apheresis consult           Past Medical History:   No past medical history on file.  -chronic systolic heart failure,   -NICM,   -moderate CAD,   -HTN,   -ABHINAV on CPAP,   -DM2,   -CKDIII          Past Surgical History:     Past Surgical History:   Procedure Laterality Date     CV CENTRAL VENOUS CATHETER PLACEMENT N/A 2020    Procedure: Central Venous Catheter Placement;  Surgeon: Chente Moss MD;  Location:  HEART CARDIAC CATH LAB     CV INTRA-AORTIC BALLOON PUMP INSERTION N/A 2020    Procedure: Intra-Aortic Balloon Pump Insertion;  Surgeon: Jose Baldwin MD;  Location:  HEART CARDIAC CATH LAB     CV INTRA-AORTIC BALLOON PUMP INSERTION N/A 2020    Procedure: Intra-Aortic Balloon Pump Insertion;  Surgeon: Chente Moss MD;  Location:  HEART CARDIAC CATH LAB     CV SWAN LUCIANA PROCEDURE N/A 2020    Procedure: [place] Procedure;  Surgeon: Chente Moss MD;  Location:  HEART CARDIAC CATH LAB            Social History:   .          Family History:   No family history on file.         Immunizations:     There is no immunization history on file for this patient.          Allergies:        Allergies   Allergen Reactions     Heparin      HIT screen positive 20, reflex DAVINA in process____  HIT screen negative 20     Oxycodone Itching and Other (See Comments)             Medications:     Current Facility-Administered Medications   Medication     acetaminophen (TYLENOL) tablet 650 mg     albuterol (PROAIR HFA/PROVENTIL HFA/VENTOLIN HFA) 108 (90 Base) MCG/ACT inhaler 2 puff     allopurinol (ZYLOPRIM) tablet 200 mg     [START ON  2/16/2020] amiodarone (PACERONE) tablet 400 mg     Anticoagulant Citrate Dextrose Formula A at ratio of 1:10 with blood ([facility])     aspirin (ASA) chewable tablet 81 mg     atorvastatin (LIPITOR) tablet 20 mg     bisacodyl (DULCOLAX) EC tablet 5 mg    Or     bisacodyl (DULCOLAX) EC tablet 10 mg    Or     bisacodyl (DULCOLAX) EC tablet 15 mg     bivalirudin (ANGIOMAX) 250 mg in sodium chloride 0.9 % 250 mL ANTICOAGULANT infusion     calcium carbonate (TUMS) chewable tablet 500 mg     calcium chloride 1 g in sodium chloride 0.9 % 100 mL intermittent infusion     calcium chloride 2 g in sodium chloride 0.9 % 100 mL intermittent infusion     calcium chloride 3 g in sodium chloride 0.9 % 200 mL intermittent infusion     calcium chloride 4 g in sodium chloride 0.9 % 200 mL intermittent infusion     calcium gluconate with plasma (administered by Apheresis Staff ONLY)     cefTRIAXone (ROCEPHIN) 2 g vial to attach to  ml bag for ADULTS or NS 50 ml bag for PEDS     co-enzyme Q-10 capsule 200 mg     glucose gel 15-30 g    Or     dextrose 50 % injection 25-50 mL    Or     glucagon injection 1 mg     dialysate for CVVHD & CVVHDF (Phoxillum BK4/2.5)     diclofenac (VOLTAREN) 1 % topical gel 4 g     DOBUTamine (DOBUTREX) 1,000 mg in D5W 250 mL infusion (adult max conc)     DOPamine 800 mg in dextrose 5% 250 mL (adult max conc) - premix     fentaNYL (PF) (SUBLIMAZE) injection 25 mcg     FLUoxetine (PROzac) capsule 20 mg     hydrALAZINE (APRESOLINE) tablet 25 mg     insulin aspart (NovoLOG) inj (RAPID ACTING)     insulin aspart (NovoLOG) inj (RAPID ACTING)     insulin glargine (LANTUS PEN) injection 10 Units     lidocaine (LMX4) cream     lidocaine 1 % 0.1-1 mL     magnesium oxide (MAG-OX) tablet 400 mg     magnesium sulfate 2 g in water intermittent infusion     medication instruction     melatonin tablet 3 mg     naloxone (NARCAN) injection 0.1-0.4 mg     No heparin required     omeprazole (priLOSEC) CR capsule 20  mg     polyethylene glycol (MIRALAX) Packet 17 g     POST-filter replacement solution for CVVHD & CVVHDF (Phoxillum BK4/2.5)     potassium chloride 20 mEq in 50 mL intermittent infusion     PRE-filter replacement solution for CVVHD & CVVHDF (Phoxillum BK4/2.5)     senna-docusate (SENOKOT-S/PERICOLACE) 8.6-50 MG per tablet 1 tablet    Or     senna-docusate (SENOKOT-S/PERICOLACE) 8.6-50 MG per tablet 2 tablet     sodium chloride (PF) 0.9% PF flush 3 mL     sodium chloride (PF) 0.9% PF flush 3 mL     sodium phosphate 20 mmol in D5W 100 mL intermittent infusion     vancomycin (VANCOCIN) 2,000 mg in sodium chloride 0.9 % 250 mL intermittent infusion     vitamin B1 (THIAMINE) tablet 100 mg          Review of Systems:   See above.           Data:     Vitals:    02/15/20 1230 02/15/20 1245 02/15/20 1300 02/15/20 1315   BP:       BP Location:       Pulse:       Resp: 14 21 18 13   Temp:       TempSrc:       SpO2:  97% 99% 98%   Weight:       Height:           BMP  Recent Labs   Lab 02/15/20  1034 02/15/20  0328 02/14/20  2208 02/14/20  1707 02/14/20  1530    135 133  --  134   POTASSIUM 4.1 3.9 4.1  --  3.6   CHLORIDE 105 103 103  --  101   CALOS 8.5 8.8 9.7  --  8.9   CO2 24 28 22  --  28   BUN 24 27 28 28 29   CR 1.16 1.25 1.30* 1.36* 1.42*   * 118* 144*  --  115*     CBC  Recent Labs   Lab 02/15/20  0328 02/14/20  1530 02/14/20  0331 02/13/20  1030   WBC 12.2* 13.5* 15.3* 15.9*   RBC 4.72 4.69 4.72 5.11   HGB 13.2* 13.2* 13.5 14.5   HCT 42.6 40.9 40.9 44.5   MCV 90 87 87 87   MCH 28.0 28.1 28.6 28.4   MCHC 31.0* 32.3 33.0 32.6   RDW 18.9* 18.8* 18.8* 18.6*   * 107* 92* 88*     INR  Recent Labs   Lab 02/13/20  0206   INR 1.55*     HIT Screen negative (2/11/20)  HIT Screen 3.6 units/mL (2/14/20)  DAVINA pending    Attestation: I met with the patient and family members to discuss the plan for TPEs.  I reviewed the TPE procedure and the need for plasma, including the risks and benefits of both the TPE and  blood.  I answered the patient's and family members' questions to the best of my ability.  The patient agreed to proceed, signing the consent forms.  We will proceed with the TPE series as soon as possible today.    Jamar Wills M.D.  Professor, Transfusion Medicine  Laboratory Medicine & Pathology  Pager: [phone]

## 2020-02-15 NOTE — PROGRESS NOTES
Nephrology Progress Note  02/15/2020         Mr Eliseo Tanner is a 65yo M with  H/O  Stage D Class IV HF,NICM( LVEF 15-20 %) moderate CAD, HTN, DM2, CKD, admitted to [facility] on 2/7/20 with Cardiogenic shock and V fib with multiple ICD shocks. Patient is not responding to diuresis , on pressure support . Developed rigors and there is concern for septic shock .  Nephrology consulted for BERNARDA, started CRRT 2/13 for volume management.       Interval History :   Mr Tanner continues with CRRT for volume management but with increasing UOP up to 3L yesterday we are holding on restarting if circuit clots to see how renal fx trends.  Team is making plans for possible LVAD including possible PLEX for HIT, will follow closely.      Assessment & Recommendations:   BERNARDA on CKD stage 3-Baseline Cr 1.5-2, up to 2.2 at time of consult with decreasing UOP in setting of cardiogenic shock despite diuretics.  He has underlying CKD likely from T2DM and HTN . Follows with Dr Jimbo Vila, Nephrologist  [facility] in [place], [place] . Review of his note shows that patient has CKD stage 3 ( baseline Cr 1.3 - 1.5) with Non nephrotic range proteinuria , MGUS, Chronic gout, HTN.  Etiology of BERNARDA felt to be CRS with likely some degree of ATN with septic/distributive shock.  CRRT was started on 2/13 for volume management, pulling 0-50cc/hr with both inotrope and IABP support.                 -Line is [facility] 2/13 temp line               -Continuing CRRT, 4k baths, 0-50cc/hr net negative.  Can hold on restarting if circuit clots.      Volume status-Net negative yesterday with UOP up to 3L, only pulled 1L with CRRT.  Given this, will hold on restarting CRRT if circuit clots and see how UOP and renal fx trends.       Electrolytes/pH-K 4.1, bicarb 24, no acute issues on CRRT.       Ca/phos/pth-Ca 8.5, Mg and Phos reasonable, on CRRT.         Anemia-Hgb 13.2, no acute issues.      Nutrition-Taking some PO.       Seen and discussed with   "Miriam     Recommendations were communicated to primary team via verbal communication.     JUAN Avery CNS  Clinical Nurse Specialist  [phone]    Review of Systems:   I reviewed the following systems:  Gen: No fevers or chills  CV: No CP at rest  Resp: No SOB at rest  GI: No N/V    Physical Exam:   I/O last 3 completed shifts:  In: 2996.68 [P.O.:1120; I.V.:1830.68; Other:46]  Out: 5977 [Urine:4445; Other:1532]   BP (!) 86/58   Pulse 102   Temp 97.8  F (36.6  C) (Axillary)   Resp 24   Ht 1.702 m (5' 7\")   Wt 101.8 kg (224 lb 6.9 oz)   SpO2 98%   BMI 35.15 kg/m       GENERAL APPEARANCE: no distress, he is  awake  EYES: no scleral icterus, pupils equal  Endo: no goiter, no moon facies  Lymphatics: no cervical or supraclavicular LAD  Pulmonary: lungs clear to auscultation with equal breath sounds bilaterally, no clubbing  CV: regular rhythm, normal rate, no rub   - JVD no -   Edema 1+ pedal edema   GI: soft, nontender, normal bowel sounds  MS: no evidence of inflammation in joints, no muscle tenderness  : has  lombardo  SKIN: no rash, warm, dry, no cyanosis  NEURO: face symmetric, no asterixis     Labs:   All labs reviewed by me  Electrolytes/Renal -   Recent Labs   Lab Test 02/15/20  1034 02/15/20  0328 02/14/20  2208  02/14/20  1530    135 133  --  134   POTASSIUM 4.1 3.9 4.1  --  3.6   CHLORIDE 105 103 103  --  101   CO2 24 28 22  --  28   BUN 24 27 28   < > 29   CR 1.16 1.25 1.30*   < > 1.42*   * 118* 144*  --  115*   CALOS 8.5 8.8 9.7  --  8.9   MAG 2.2 2.2  --   --  2.3   PHOS 3.8 4.0  --   --  3.6    < > = values in this interval not displayed.       CBC -   Recent Labs   Lab Test 02/15/20  0328 02/14/20  1530 02/14/20  0331   WBC 12.2* 13.5* 15.3*   HGB 13.2* 13.2* 13.5   * 107* 92*       LFTs -   Recent Labs   Lab Test 02/15/20  0328 02/14/20  0331 02/13/20  1217   ALKPHOS 146 136 177*   BILITOTAL 0.8 1.4* 1.8*   ALT 82* 83* 58   AST 82* 107* 83*   PROTTOTAL 7.0 7.1 7.3 "   ALBUMIN 2.5* 2.7* 2.7*       Iron Panel -   Recent Labs   Lab Test 02/08/20  0408   IRON 25*   IRONSAT 8*   HEAVENLY 189           Current Medications:    allopurinol  200 mg Oral Daily     [START ON 2/16/2020] amiodarone  400 mg Oral Daily     aspirin  81 mg Oral Daily     atorvastatin  20 mg Oral QPM     cefTRIAXone  2 g Intravenous Q24H     co-enzyme Q-10  200 mg Oral Daily     diclofenac  4 g Topical 4x Daily     FLUoxetine  20 mg Oral Daily     hydrALAZINE  25 mg Oral Q8H BRITTANI     insulin aspart  1-7 Units Subcutaneous TID AC     insulin aspart  1-5 Units Subcutaneous At Bedtime     insulin glargine  10 Units Subcutaneous At Bedtime     magnesium oxide  400 mg Oral Daily     omeprazole  20 mg Oral QAM AC     senna-docusate  1 tablet Oral or Feeding Tube BID    Or     senna-docusate  2 tablet Oral or Feeding Tube BID     sodium chloride (PF)  3 mL Intracatheter Q8H     vancomycin (VANCOCIN) IV  2,000 mg (central catheter) Intravenous Q24H     vitamin B1  100 mg Oral Daily       bivalirudin ANTICOAGULANT (ANGIOMAX) 250mg/250mL in 0.9% Sodium Chloride 0.02 mg/kg/hr (02/15/20 1100)     CRRT replacement solution 12.5 mL/kg/hr (02/15/20 0755)     DOBUTamine 7.5 mcg/kg/min (02/15/20 1116)     DOPamine 4 mcg/kg/min (02/15/20 1116)     - MEDICATION INSTRUCTIONS -       - MEDICATION INSTRUCTIONS -       CRRT replacement solution 1.837 mL/kg/hr (02/15/20 0330)     CRRT replacement solution 12.5 mL/kg/hr (02/15/20 0755)       Patient was seen by myself, Dr. Yosvany Pineda, in conjunction with Blake Hopper as part of a shared visit.    I personally reviewed past medical and surgical history, vital signs, medications and labs.  Present and past medical history, along with significant physical exam findings were all reviewed with LISA.    Key management decisions made by me and discussed with LISA:  Ok to stop CRRT as long as I/o goals are not met through diuresis.      [phone]

## 2020-02-15 NOTE — PROCEDURES
Laboratory Medicine and Pathology  Transfusion Medicine - Apheresis Procedure    Eliseo Tanner MRN# [id number]   YOB: 1955 Age: 64 year old   Date of Admission: 2020     Reason for consult: Concern for HIT and imminent surgery (LVAD)           Assessment and Plan:   The patient is a 64 you male with chronic systolic heart failure, NICM, moderate CAD, HTN, ABHINAV on CPAP, DM2, CKDIII who is transferred to [facility] for evaluation and management of advanced heart failure with arrhythmia.  The patient will likely move forward with LVAD placement, and concern for HIT arose with drop in platelet count coinciding with use of heparin.  HIT screen positive on 20.  Serotonin release assay pending.  Team requesting series of therapeutic plasma exchanges (TPE), tentative plan for 5 TPEs.    The patient is tolerating TPE #1.  No concerns roughly 1/3 through the TPE.  No signs/symptoms of hypocalcemia secondary to citrate-based anticoagulant.  Monitoring ionized calcium and replacing with calcium gluconate.  I chatted briefly with the patient and his wife, answering any remaining questions.  Continue with plan.  Next TPE scheduled for tomorrow AM.      Treatment plan:  Plan for series of 5 daily TPEs.  The patient plasma volume is 3,100 mL.  We will use plasma as exchange fluid.  We will monitor the heparin antibodies using the HIT ELAINE pre- and post-TPE.         Please do not start or continue ACE inhibitors throughout the duration of a TPE series.  ACE inhibitors can elicit bradykinin-based reactions (hypotension, flushing, etc) in the setting of TPE.  Also, it is noted that the patient is receiving ceftriaxone.  Please do not administer ceftriaxone concurrent with TPE.  Either dose ceftriaxone before of after each TPE.  While the experience is primarily in the , concurrent ceftriaxone and TPE (with calcium gluconate to offset citrate-induced hypocalcemia) can cause crystal formation and  deposition in tissues.            Chief Complaint:   Transfusion Medicine/Apheresis consult           Past Medical History:   No past medical history on file.  -chronic systolic heart failure,   -NICM,   -moderate CAD,   -HTN,   -ABHINAV on CPAP,   -DM2,   -CKDIII          Past Surgical History:     Past Surgical History:   Procedure Laterality Date     CV CENTRAL VENOUS CATHETER PLACEMENT N/A 2/13/2020    Procedure: Central Venous Catheter Placement;  Surgeon: Chente Moss MD;  Location:  HEART CARDIAC CATH LAB     CV INTRA-AORTIC BALLOON PUMP INSERTION N/A 2/7/2020    Procedure: Intra-Aortic Balloon Pump Insertion;  Surgeon: Jose Baldwin MD;  Location:  HEART CARDIAC CATH LAB     CV INTRA-AORTIC BALLOON PUMP INSERTION N/A 2/13/2020    Procedure: Intra-Aortic Balloon Pump Insertion;  Surgeon: Chente Moss MD;  Location:  HEART CARDIAC CATH LAB     CV SWAN LUCIANA PROCEDURE N/A 2/13/2020    Procedure: [place] Procedure;  Surgeon: Chente Moss MD;  Location:  HEART CARDIAC CATH LAB            Social History:   .          Family History:   No family history on file.         Immunizations:     There is no immunization history on file for this patient.          Allergies:        Allergies   Allergen Reactions     Heparin      HIT screen positive 2/14/20, reflex DAVINA in process____  HIT screen negative 2/11/20     Oxycodone Itching and Other (See Comments)             Medications:     Current Facility-Administered Medications   Medication     acetaminophen (TYLENOL) tablet 650 mg     albuterol (PROAIR HFA/PROVENTIL HFA/VENTOLIN HFA) 108 (90 Base) MCG/ACT inhaler 2 puff     allopurinol (ZYLOPRIM) tablet 200 mg     [START ON 2/16/2020] amiodarone (PACERONE) tablet 400 mg     Anticoagulant Citrate Dextrose Formula A at ratio of 1:10 with blood ([facility]heresis Center)     anticoagulant citrate flush 3 mL     anticoagulant citrate flush 3 mL     aspirin (ASA)  chewable tablet 81 mg     atorvastatin (LIPITOR) tablet 20 mg     bisacodyl (DULCOLAX) EC tablet 5 mg    Or     bisacodyl (DULCOLAX) EC tablet 10 mg    Or     bisacodyl (DULCOLAX) EC tablet 15 mg     bivalirudin (ANGIOMAX) 250 mg in sodium chloride 0.9 % 250 mL ANTICOAGULANT infusion     calcium carbonate (TUMS) chewable tablet 500 mg     calcium chloride 1 g in sodium chloride 0.9 % 100 mL intermittent infusion     calcium chloride 2 g in sodium chloride 0.9 % 100 mL intermittent infusion     calcium chloride 3 g in sodium chloride 0.9 % 200 mL intermittent infusion     calcium chloride 4 g in sodium chloride 0.9 % 200 mL intermittent infusion     calcium gluconate with plasma (administered by Apheresis Staff ONLY)     cefTRIAXone (ROCEPHIN) 2 g vial to attach to  ml bag for ADULTS or NS 50 ml bag for PEDS     co-enzyme Q-10 capsule 200 mg     glucose gel 15-30 g    Or     dextrose 50 % injection 25-50 mL    Or     glucagon injection 1 mg     dialysate for CVVHD & CVVHDF (Phoxillum BK4/2.5)     diclofenac (VOLTAREN) 1 % topical gel 4 g     DOBUTamine (DOBUTREX) 1,000 mg in D5W 250 mL infusion (adult max conc)     DOPamine 800 mg in dextrose 5% 250 mL (adult max conc) - premix     fentaNYL (PF) (SUBLIMAZE) injection 25 mcg     FLUoxetine (PROzac) capsule 20 mg     insulin aspart (NovoLOG) inj (RAPID ACTING)     insulin aspart (NovoLOG) inj (RAPID ACTING)     insulin glargine (LANTUS PEN) injection 10 Units     lidocaine (LMX4) cream     lidocaine 1 % 0.1-1 mL     magnesium oxide (MAG-OX) tablet 400 mg     magnesium sulfate 2 g in water intermittent infusion     medication instruction     melatonin tablet 3 mg     naloxone (NARCAN) injection 0.1-0.4 mg     No heparin required     omeprazole (priLOSEC) CR capsule 20 mg     polyethylene glycol (MIRALAX) Packet 17 g     POST-filter replacement solution for CVVHD & CVVHDF (Phoxillum BK4/2.5)     potassium chloride 20 mEq in 50 mL intermittent infusion      PRE-filter replacement solution for CVVHD & CVVHDF (Phoxillum BK4/2.5)     senna-docusate (SENOKOT-S/PERICOLACE) 8.6-50 MG per tablet 1 tablet    Or     senna-docusate (SENOKOT-S/PERICOLACE) 8.6-50 MG per tablet 2 tablet     sodium chloride (PF) 0.9% PF flush 3 mL     sodium chloride (PF) 0.9% PF flush 3 mL     sodium phosphate 20 mmol in D5W 100 mL intermittent infusion     vancomycin place cheng - receiving intermittent dosing     vitamin B1 (THIAMINE) tablet 100 mg          Review of Systems:   See above.           Data:     Vitals:    02/15/20 1400 02/15/20 1415 02/15/20 1430 02/15/20 1445   BP:       BP Location:       Pulse:       Resp: 23 26 20 18   Temp:       TempSrc:       SpO2: 95% 98% 98% 98%   Weight:       Height:           BMP  Recent Labs   Lab 02/15/20  1034 02/15/20  0328 02/14/20  2208 02/14/20  1707 02/14/20  1530    135 133  --  134   POTASSIUM 4.1 3.9 4.1  --  3.6   CHLORIDE 105 103 103  --  101   CALOS 8.5 8.8 9.7  --  8.9   CO2 24 28 22  --  28   BUN 24 27 28 28 29   CR 1.16 1.25 1.30* 1.36* 1.42*   * 118* 144*  --  115*     CBC  Recent Labs   Lab 02/15/20  1354 02/15/20  0328 02/14/20  1530 02/14/20  0331 02/13/20  1030   WBC  --  12.2* 13.5* 15.3* 15.9*   RBC  --  4.72 4.69 4.72 5.11   HGB 13.2* 13.2* 13.2* 13.5 14.5   HCT  --  42.6 40.9 40.9 44.5   MCV  --  90 87 87 87   MCH  --  28.0 28.1 28.6 28.4   MCHC  --  31.0* 32.3 33.0 32.6   RDW  --  18.9* 18.8* 18.8* 18.6*   PLT  --  118* 107* 92* 88*     INR  Recent Labs   Lab 02/13/20  0206   INR 1.55*     HIT Screen negative (2/11/20)  HIT Screen 3.6 units/mL (2/14/20)  DAVINA pending    Attestation: During the TPE the patient was directly seen and evaluated by me, Jamar Wills M.D..    Jamar Wills M.D.  Professor, Transfusion Medicine  Laboratory Medicine & Pathology  Pager: [phone]

## 2020-02-15 NOTE — PROGRESS NOTES
Cardiology Progress Note  Eliseo Tanner MRN: [id number]  Age: 64 year old, : 1955  Date: 2020              Assessment and Plan:     Mr Eliseo Tanner is a 65yo M w/PMHx notable for chronic systolic heart failure, NICM, moderate CAD, HTN, ABHINAV on CPAP, DM2, CKDIII who is transferred to [facility] for evaluation and management of advanced heart failure with arrhythmia.     Today's plan (2/15):  -Continue CRRT  -Transfusion consult for PLEX   -Continue hydralazine 25mg po TID   -Heme consult   -Will give additional dose of diuresis pending UO this afternoon     Moderate CAD  Severe Mitral regurgitation  Chronic nonischemic systolic heart failure w/ reduced EF (15-20%) and exacerbation  NYHA Class III. Echo 2020: LVEF 15-20%; LVEDd 5.9 cm; 4+ MR. [facility] 2019 w/ nonobstructive CAD w/ severe branch disease. Presented with increased wt gain, SOB, LE edema concerning for HF exacerbation, initially concerning for cardiogenic shock. Admitted to [facility] for further evaluation regarding need for advanced HF therapies. Overall, impression is that once infections are clear and renal function improved, can consider listing for OHT or LVAD placement.  -Financial approval obtained for OHT/LVAD w/u; order set has been placed  -[place] placed: hemodynamics w/ Jane CO/CI q6h  -Telemetry  -Lactic acid, VBG ordered; trend  -Beta-blocker: None due to decompensated HF  -ACEi/ARB/ARNI: Holding home Entresto  mg BID  -Aldosterone antagonist: Holding spironolactone until renal assessed   -Volume status: Hypervolemic on exam. Weight on admit 240. Baseline weight 225-230.  -Diuretic regimen: on CRRT, will reassess later this afternoon  -Continue dobutmaine 7.5, Dopamine 4  -Afterload reduction: holding in setting of being on dopmaine  -Continue ASA 81, atorvastatin 20 mg    Proteus and Enterococcus bacteremia  Organisms growing from 2/2 blood cultures from .  -daily blood cultures until  "negative  -Zosyn (2/13-2/14  -Tobramycin (2/13-2/14)  -Vancomycin (2/13-  -Meropenem (2/14-    BERNARDA on CKDIII  Cr on admission 1.7, baseline Cr 1.5-2. Goal to improve renal function with diuresis in order to make best candidate for potential LVAD.  -Trend Cr  -Daily fluid balance  -Diuresis as above    Precapillary pulmonary hypertension:  Significant transpulmonary gradient of 14 on right heart cath numbers. May be related to CTEPH vs. WHO I.  -Empiric heparin  -V/Q ordered on 2/6, but not done yet    #Thrombocytopenia:  Concern for HIT given timing with respect to heparin exposure. HIT positive DAVINA pending   -Heme consult   -Bivalirudin gtt   -PLEX per transfusion medicine     VFib requiring shock  Shocked x 3 prior to transfer, loaded with amio. No known prior history. Suspect related to underlying HF.   -Keep K>4, Mg>2  -Amio 400 mg PO BID  -Need ICD for secondary prevention     Diabetes Mellitus Type II  Last A1c 7.2% 2/6/20  Home regimen includes: 15U basaglar at bedtime, glipizide 2.5  -Will resume home glargine at 30% reduction, 10U QHS  -Holding home oral regimen while renal function and/or hemodynamic status is either impaired or threatened  -Preprandial blood glucose target <140 mg/dL and random <180mg/dl, or 140-180 mg/dL for critically ill  -SSI insulin, q4h blood sugar checks, hypoglycemia protocol ordered     Chronic:  ABHINAV: Home CPAP  Gout: PTA allopurinol 200 daily  MDD: PTA fluoxetine  GERD: PTA PPI  COPD: albuterol PRN    FEN:  2gm Na   2L water restriction    PPX: Bivalirudin    CODE: FULL CODE     Alina James   Cardiology fellow             Subjective/Interval Events     Increased UO overnight   Awake and comfortable in bed           Objective     BP (!) 86/58   Pulse 102   Temp 98  F (36.7  C) (Axillary)   Resp 14   Ht 1.702 m (5' 7\")   Wt 101.8 kg (224 lb 6.9 oz)   SpO2 99%   BMI 35.15 kg/m    Temp:  [97.3  F (36.3  C)-98  F (36.7  C)] 98  F (36.7  C)  Heart Rate:  [] 98  Resp:  " [9-25] 14  MAP:  [61 mmHg-99 mmHg] 70 mmHg  Arterial Line BP: ()/(31-64) 114/39  SpO2:  [87 %-100 %] 99 %  Wt Readings from Last 2 Encounters:   02/15/20 101.8 kg (224 lb 6.9 oz)     I/O last 3 completed shifts:  In: 2996.68 [P.O.:1120; I.V.:1830.68; Other:46]  Out: 5977 [Urine:4445; Other:1532]      Gen: No acute distress  HEENT: NC/AT, PERRL, EOM intact, MMM, OP without exudates  PULM/THORAX: Clear to auscultation bilaterally, no rales/rhonchi/wheezes  CV: normal rate, regular rhythm, normal S1 and S2, no murmurs or rubs.  ABD: Soft, NTND, bowel sounds present, no masses  EXT: WWP. No LE edema, clubbing or cyanosis.  NEURO: CN II-XII grossly intact. A&Ox3    Lines:  -R IJ PA catheter  -Radial arterial line  -L femoral arterial IABP            Data:     Recent Results (from the past 24 hour(s))   Glucose by meter    Collection Time: 02/14/20  3:25 PM   Result Value Ref Range    Glucose 94 70 - 99 mg/dL   Basic metabolic panel    Collection Time: 02/14/20  3:30 PM   Result Value Ref Range    Sodium 134 133 - 144 mmol/L    Potassium 3.6 3.4 - 5.3 mmol/L    Chloride 101 94 - 109 mmol/L    Carbon Dioxide 28 20 - 32 mmol/L    Anion Gap 5 3 - 14 mmol/L    Glucose 115 (H) 70 - 99 mg/dL    Urea Nitrogen 29 7 - 30 mg/dL    Creatinine 1.42 (H) 0.66 - 1.25 mg/dL    GFR Estimate 51 (L) >60 mL/min/[1.73_m2]    GFR Estimate If Black 59 (L) >60 mL/min/[1.73_m2]    Calcium 8.9 8.5 - 10.1 mg/dL   Phosphorus    Collection Time: 02/14/20  3:30 PM   Result Value Ref Range    Phosphorus 3.6 2.5 - 4.5 mg/dL   Magnesium    Collection Time: 02/14/20  3:30 PM   Result Value Ref Range    Magnesium 2.3 1.6 - 2.3 mg/dL   CBC with platelets    Collection Time: 02/14/20  3:30 PM   Result Value Ref Range    WBC 13.5 (H) 4.0 - 11.0 10e9/L    RBC Count 4.69 4.4 - 5.9 10e12/L    Hemoglobin 13.2 (L) 13.3 - 17.7 g/dL    Hematocrit 40.9 40.0 - 53.0 %    MCV 87 78 - 100 fl    MCH 28.1 26.5 - 33.0 pg    MCHC 32.3 31.5 - 36.5 g/dL    RDW 18.8 (H)  10.0 - 15.0 %    Platelet Count 107 (L) 150 - 450 10e9/L   Blood gas venous with oxyhemoglobin (PCU Collect TBD)    Collection Time: 02/14/20  3:30 PM   Result Value Ref Range    Ph Venous 7.36 7.32 - 7.43 pH    PCO2 Venous 39 (L) 40 - 50 mm Hg    PO2 Venous 30 25 - 47 mm Hg    Bicarbonate Venous 22 21 - 28 mmol/L    FIO2 21     Oxyhemoglobin Venous 50 %    Base Deficit Venous 3.0 mmol/L   Calcium ionized whole blood    Collection Time: 02/14/20  3:30 PM   Result Value Ref Range    Calcium Ionized Whole Blood 4.0 (L) 4.4 - 5.2 mg/dL   Blood gas venous with oxyhemoglobin (PCU Collect TBD)    Collection Time: 02/14/20  4:45 PM   Result Value Ref Range    Ph Venous 7.36 7.32 - 7.43 pH    PCO2 Venous 42 40 - 50 mm Hg    PO2 Venous 26 25 - 47 mm Hg    Bicarbonate Venous 23 21 - 28 mmol/L    FIO2 21     Oxyhemoglobin Venous 39 %    Base Deficit Venous 2.3 mmol/L   Blood gas venous with oxyhemoglobin (PCU Collect TBD)    Collection Time: 02/14/20  5:03 PM   Result Value Ref Range    Ph Venous 7.34 7.32 - 7.43 pH    PCO2 Venous 51 (H) 40 - 50 mm Hg    PO2 Venous 29 25 - 47 mm Hg    Bicarbonate Venous 28 21 - 28 mmol/L    FIO2 2L     Oxyhemoglobin Venous 45 %    Base Excess Venous 1.2 mmol/L   Partial thromboplastin time    Collection Time: 02/14/20  5:07 PM   Result Value Ref Range    PTT 44 (H) 22 - 37 sec   Urea nitrogen    Collection Time: 02/14/20  5:07 PM   Result Value Ref Range    Urea Nitrogen 28 7 - 30 mg/dL   Creatinine    Collection Time: 02/14/20  5:07 PM   Result Value Ref Range    Creatinine 1.36 (H) 0.66 - 1.25 mg/dL    GFR Estimate 54 (L) >60 mL/min/[1.73_m2]    GFR Estimate If Black 62 >60 mL/min/[1.73_m2]   Blood gas venous with oxyhemoglobin (PCU Collect TBD)    Collection Time: 02/14/20  5:45 PM   Result Value Ref Range    Ph Venous 7.34 7.32 - 7.43 pH    PCO2 Venous 50 40 - 50 mm Hg    PO2 Venous 29 25 - 47 mm Hg    Bicarbonate Venous 27 21 - 28 mmol/L    FIO2 2L     Oxyhemoglobin Venous 47 %    Base  Excess Venous 0.6 mmol/L   Partial thromboplastin time    Collection Time: 02/14/20  6:17 PM   Result Value Ref Range    PTT 47 (H) 22 - 37 sec   Blood gas venous with oxyhemoglobin (PCU Collect TBD)    Collection Time: 02/14/20  7:39 PM   Result Value Ref Range    Ph Venous 7.32 7.32 - 7.43 pH    PCO2 Venous 52 (H) 40 - 50 mm Hg    PO2 Venous 30 25 - 47 mm Hg    Bicarbonate Venous 27 21 - 28 mmol/L    FIO2 2L     Oxyhemoglobin Venous 47 %    Base Deficit Venous 0.2 mmol/L   Glucose by meter    Collection Time: 02/14/20  7:47 PM   Result Value Ref Range    Glucose 147 (H) 70 - 99 mg/dL   Basic metabolic panel    Collection Time: 02/14/20 10:08 PM   Result Value Ref Range    Sodium 133 133 - 144 mmol/L    Potassium 4.1 3.4 - 5.3 mmol/L    Chloride 103 94 - 109 mmol/L    Carbon Dioxide 22 20 - 32 mmol/L    Anion Gap 7 3 - 14 mmol/L    Glucose 144 (H) 70 - 99 mg/dL    Urea Nitrogen 28 7 - 30 mg/dL    Creatinine 1.30 (H) 0.66 - 1.25 mg/dL    GFR Estimate 57 (L) >60 mL/min/[1.73_m2]    GFR Estimate If Black 66 >60 mL/min/[1.73_m2]    Calcium 9.7 8.5 - 10.1 mg/dL   Calcium ionized    Collection Time: 02/14/20 10:08 PM   Result Value Ref Range    Calcium Ionized 5.3 (H) 4.4 - 5.2 mg/dL   Glucose by meter    Collection Time: 02/14/20 10:13 PM   Result Value Ref Range    Glucose 153 (H) 70 - 99 mg/dL   Blood gas venous with oxyhemoglobin (PCU Collect TBD)    Collection Time: 02/15/20 12:10 AM   Result Value Ref Range    Ph Venous 7.33 7.32 - 7.43 pH    PCO2 Venous 52 (H) 40 - 50 mm Hg    PO2 Venous 32 25 - 47 mm Hg    Bicarbonate Venous 27 21 - 28 mmol/L    FIO2 3     Oxyhemoglobin Venous 52 %    Base Excess Venous 0.1 mmol/L   Comprehensive metabolic panel    Collection Time: 02/15/20  3:28 AM   Result Value Ref Range    Sodium 135 133 - 144 mmol/L    Potassium 3.9 3.4 - 5.3 mmol/L    Chloride 103 94 - 109 mmol/L    Carbon Dioxide 28 20 - 32 mmol/L    Anion Gap 3 3 - 14 mmol/L    Glucose 118 (H) 70 - 99 mg/dL    Urea  Nitrogen 27 7 - 30 mg/dL    Creatinine 1.25 0.66 - 1.25 mg/dL    GFR Estimate 59 (L) >60 mL/min/[1.73_m2]    GFR Estimate If Black 69 >60 mL/min/[1.73_m2]    Calcium 8.8 8.5 - 10.1 mg/dL    Bilirubin Total 0.8 0.2 - 1.3 mg/dL    Albumin 2.5 (L) 3.4 - 5.0 g/dL    Protein Total 7.0 6.8 - 8.8 g/dL    Alkaline Phosphatase 146 40 - 150 U/L    ALT 82 (H) 0 - 70 U/L    AST 82 (H) 0 - 45 U/L   CBC with platelets    Collection Time: 02/15/20  3:28 AM   Result Value Ref Range    WBC 12.2 (H) 4.0 - 11.0 10e9/L    RBC Count 4.72 4.4 - 5.9 10e12/L    Hemoglobin 13.2 (L) 13.3 - 17.7 g/dL    Hematocrit 42.6 40.0 - 53.0 %    MCV 90 78 - 100 fl    MCH 28.0 26.5 - 33.0 pg    MCHC 31.0 (L) 31.5 - 36.5 g/dL    RDW 18.9 (H) 10.0 - 15.0 %    Platelet Count 118 (L) 150 - 450 10e9/L   Phosphorus    Collection Time: 02/15/20  3:28 AM   Result Value Ref Range    Phosphorus 4.0 2.5 - 4.5 mg/dL   Magnesium    Collection Time: 02/15/20  3:28 AM   Result Value Ref Range    Magnesium 2.2 1.6 - 2.3 mg/dL   Partial thromboplastin time    Collection Time: 02/15/20  3:28 AM   Result Value Ref Range    PTT 45 (H) 22 - 37 sec   Type and Screen (AM Draw)    Collection Time: 02/15/20  3:28 AM   Result Value Ref Range    ABO A     RH(D) Pos     Antibody Screen Neg     Test Valid Only At          [facility]    Specimen Expires 02/18/2020    Blood gas venous with oxyhemoglobin (PCU Collect TBD)    Collection Time: 02/15/20  3:28 AM   Result Value Ref Range    Ph Venous 7.34 7.32 - 7.43 pH    PCO2 Venous 52 (H) 40 - 50 mm Hg    PO2 Venous 33 25 - 47 mm Hg    Bicarbonate Venous 28 21 - 28 mmol/L    FIO2 3     Oxyhemoglobin Venous 57 %    Base Excess Venous 1.0 mmol/L   Calcium ionized whole blood    Collection Time: 02/15/20  3:28 AM   Result Value Ref Range    Calcium Ionized Whole Blood 4.9 4.4 - 5.2 mg/dL   Glucose by meter    Collection Time: 02/15/20  7:43 AM   Result Value Ref Range    Glucose 105 (H) 70 -  99 mg/dL   Blood gas venous with oxyhemoglobin (PCU Collect TBD)    Collection Time: 02/15/20  7:49 AM   Result Value Ref Range    Ph Venous 7.33 7.32 - 7.43 pH    PCO2 Venous 50 40 - 50 mm Hg    PO2 Venous 34 25 - 47 mm Hg    Bicarbonate Venous 26 21 - 28 mmol/L    FIO2 3     Oxyhemoglobin Venous 56 %    Base Deficit Venous 0.7 mmol/L   Blood gas venous with oxyhemoglobin (PCU Collect TBD)    Collection Time: 02/15/20 10:27 AM   Result Value Ref Range    Ph Venous 7.36 7.32 - 7.43 pH    PCO2 Venous 46 40 - 50 mm Hg    PO2 Venous 26 25 - 47 mm Hg    Bicarbonate Venous 26 21 - 28 mmol/L    FIO2 21     Oxyhemoglobin Venous 42 %    Base Excess Venous 0.4 mmol/L   Calcium ionized whole blood    Collection Time: 02/15/20 10:27 AM   Result Value Ref Range    Calcium Ionized Whole Blood 4.5 4.4 - 5.2 mg/dL   Lactic acid whole blood    Collection Time: 02/15/20 10:27 AM   Result Value Ref Range    Lactic Acid 0.8 0.7 - 2.0 mmol/L   Basic metabolic panel    Collection Time: 02/15/20 10:34 AM   Result Value Ref Range    Sodium 136 133 - 144 mmol/L    Potassium 4.1 3.4 - 5.3 mmol/L    Chloride 105 94 - 109 mmol/L    Carbon Dioxide 24 20 - 32 mmol/L    Anion Gap 8 3 - 14 mmol/L    Glucose 116 (H) 70 - 99 mg/dL    Urea Nitrogen 24 7 - 30 mg/dL    Creatinine 1.16 0.66 - 1.25 mg/dL    GFR Estimate 65 >60 mL/min/[1.73_m2]    GFR Estimate If Black 75 >60 mL/min/[1.73_m2]    Calcium 8.5 8.5 - 10.1 mg/dL   Magnesium    Collection Time: 02/15/20 10:34 AM   Result Value Ref Range    Magnesium 2.2 1.6 - 2.3 mg/dL   Phosphorus    Collection Time: 02/15/20 10:34 AM   Result Value Ref Range    Phosphorus 3.8 2.5 - 4.5 mg/dL   Glucose by meter    Collection Time: 02/15/20 11:56 AM   Result Value Ref Range    Glucose 152 (H) 70 - 99 mg/dL   Blood gas venous with oxyhemoglobin (PCU Collect TBD)    Collection Time: 02/15/20 11:57 AM   Result Value Ref Range    Ph Venous 7.35 7.32 - 7.43 pH    PCO2 Venous 47 40 - 50 mm Hg    PO2 Venous 26 25 -  47 mm Hg    Bicarbonate Venous 26 21 - 28 mmol/L    FIO2 3.0 L     Oxyhemoglobin Venous 42 %    Base Deficit Venous 0.3 mmol/L       Recent Results (from the past 24 hour(s))   Echocardiogram Complete    Narrative    [id number]  [id number]  [id number]  [id number]^[id number]^JIM           [facility],[place]  Echocardiography Laboratory  [address] [place] [address]     Name: WOLFGANG LEACH  MRN: [id number]  : 1955  Study Date: 2020 10:06 AM  Age: 64 yrs  Gender: Male  Patient Location: [facility]  Reason For Study: Heart Failure  Ordering Physician: NAHUN GUTIERREZ  Referring Physician: SELF, REFERRED  Performed By: Yvonne Adan RDCS     BSA: 2.2 m2  Height: 67 in  Weight: 244 lb  HR: 103  BP: 72/52 mmHg  _____________________________________________________________________________  __        Procedure  Complete Portable Echo Adult. Contrast Optison. Optison (NDC #9839-7271-06)  given intravenously. Patient was given 6 ml mixture of 3 ml Optison and 6 ml  saline. 3 ml wasted.  _____________________________________________________________________________  __        Interpretation Summary  Left ventricular size is normal.  LVEF 20% based on biplane 2D tracing.  Mild to moderate right ventricular dilation is present.  Global right ventricular function is moderately reduced.  Pulmonary artery systolic pressure is normal.  Dilation of the inferior vena cava is present with abnormal respiratory  variation in diameter.  _____________________________________________________________________________  __        Left Ventricle  Left ventricular size is normal. LVEF 20% based on biplane 2D tracing. Left  ventricular wall thickness is normal. Diastolic function not assessed due to  frequent ectopy. Abnormal septal motion consistent with left bundle branch  block is present.     Right Ventricle  Mild to moderate right ventricular dilation is present. Global right  ventricular function is  moderately reduced.     Atria  Mild biatrial enlargement is present.     Mitral Valve  Moderate mitral insufficiency is present.        Aortic Valve  Aortic valve is normal in structure and function.     Tricuspid Valve  Moderate tricuspid insufficiency is present. The right ventricular systolic  pressure is approximated at 24.4 mmHg plus the right atrial pressure.  Pulmonary artery systolic pressure is normal.     Pulmonic Valve  The pulmonic valve cannot be assessed. Mild pulmonic insufficiency is present.     Vessels  The aorta root is normal. The pulmonary artery cannot be assessed. Dilation of  the inferior vena cava is present with abnormal respiratory variation in  diameter.     Compared to Previous Study  Previous study not available for comparison.     _____________________________________________________________________________  __  MMode/2D Measurements & Calculations  IVSd: 0.61 cm  LVIDd: 4.7 cm  LVIDs: 3.7 cm  LVPWd: 0.75 cm  FS: 21.7 %  LV mass(C)d: 99.1 grams  LV mass(C)dI: 45.0 grams/m2     Ao root diam: 2.9 cm  asc Aorta Diam: 2.5 cm  LVOT diam: 1.9 cm  LVOT area: 2.8 cm2     EF(MOD-bp): 21.5 %  LA Volume (BP): 84.8 ml  LA Volume Index (BP): 38.5 ml/m2  RWT: 0.32        Doppler Measurements & Calculations  PA acc time: 0.09 sec     PI end-d saumya: 154.0 cm/sec  TR max saumya: 247.0 cm/sec  TR max P.4 mmHg     _____________________________________________________________________________  __           Report approved by: Graciela Tomlinson 2020 12:05 PM      XR Chest Port 1 View    Narrative    XR CHEST PORT 1 VW  2020 4:21 PM      HISTORY: SG Placement    COMPARISON: None available    FINDINGS: Single AP chest radiograph. [place]-Ch[place] catheter tip is in the  proximal right main pulmonary artery. Right central line tip and right  upper extremity PICC line tip at the lower SVC. Trachea is midline,  cardiomegaly. Bilateral perihilar streaky opacities. Mild increased  interstitial opacities in the  right lung with ill-defined pulmonary  vascularity. No pneumothorax or pleural effusion. No acute osseous or  abdominal abnormality. Elevated left hemidiaphragm.      Impression    IMPRESSION:  1. [place]-Chucky catheter tip at the proximal right main pulmonary artery.  2. Cardiomegaly with mild pulmonary edema, especially on the right.    I have personally reviewed the examination and initial interpretation  and I agree with the findings.    DONG SOLORIO MD   Cardiac Catheterization    Narrative      Successful insertion of a IABP in the RFA                Medications     Current Facility-Administered Medications   Medication     acetaminophen (TYLENOL) tablet 650 mg     albuterol (PROAIR HFA/PROVENTIL HFA/VENTOLIN HFA) 108 (90 Base) MCG/ACT inhaler 2 puff     allopurinol (ZYLOPRIM) tablet 200 mg     [START ON 2/16/2020] amiodarone (PACERONE) tablet 400 mg     Anticoagulant Citrate Dextrose Formula A at ratio of 1:10 with blood ([facility])     aspirin (ASA) chewable tablet 81 mg     atorvastatin (LIPITOR) tablet 20 mg     bisacodyl (DULCOLAX) EC tablet 5 mg    Or     bisacodyl (DULCOLAX) EC tablet 10 mg    Or     bisacodyl (DULCOLAX) EC tablet 15 mg     bivalirudin (ANGIOMAX) 250 mg in sodium chloride 0.9 % 250 mL ANTICOAGULANT infusion     calcium carbonate (TUMS) chewable tablet 500 mg     calcium chloride 1 g in sodium chloride 0.9 % 100 mL intermittent infusion     calcium chloride 2 g in sodium chloride 0.9 % 100 mL intermittent infusion     calcium chloride 3 g in sodium chloride 0.9 % 200 mL intermittent infusion     calcium chloride 4 g in sodium chloride 0.9 % 200 mL intermittent infusion     calcium gluconate with plasma (administered by [facility] ONLY)     cefTRIAXone (ROCEPHIN) 2 g vial to attach to  ml bag for ADULTS or NS 50 ml bag for PEDS     co-enzyme Q-10 capsule 200 mg     glucose gel 15-30 g    Or     dextrose 50 % injection 25-50 mL    Or     glucagon injection 1 mg      dialysate for CVVHD & CVVHDF (Phoxillum BK4/2.5)     diclofenac (VOLTAREN) 1 % topical gel 4 g     DOBUTamine (DOBUTREX) 1,000 mg in D5W 250 mL infusion (adult max conc)     DOPamine 800 mg in dextrose 5% 250 mL (adult max conc) - premix     fentaNYL (PF) (SUBLIMAZE) injection 25 mcg     FLUoxetine (PROzac) capsule 20 mg     hydrALAZINE (APRESOLINE) tablet 25 mg     insulin aspart (NovoLOG) inj (RAPID ACTING)     insulin aspart (NovoLOG) inj (RAPID ACTING)     insulin glargine (LANTUS PEN) injection 10 Units     lidocaine (LMX4) cream     lidocaine 1 % 0.1-1 mL     magnesium oxide (MAG-OX) tablet 400 mg     magnesium sulfate 2 g in water intermittent infusion     medication instruction     melatonin tablet 3 mg     naloxone (NARCAN) injection 0.1-0.4 mg     No heparin required     omeprazole (priLOSEC) CR capsule 20 mg     polyethylene glycol (MIRALAX) Packet 17 g     POST-filter replacement solution for CVVHD & CVVHDF (Phoxillum BK4/2.5)     potassium chloride 20 mEq in 50 mL intermittent infusion     PRE-filter replacement solution for CVVHD & CVVHDF (Phoxillum BK4/2.5)     senna-docusate (SENOKOT-S/PERICOLACE) 8.6-50 MG per tablet 1 tablet    Or     senna-docusate (SENOKOT-S/PERICOLACE) 8.6-50 MG per tablet 2 tablet     sodium chloride (PF) 0.9% PF flush 3 mL     sodium chloride (PF) 0.9% PF flush 3 mL     sodium phosphate 20 mmol in D5W 100 mL intermittent infusion     vancomycin (VANCOCIN) 2,000 mg in sodium chloride 0.9 % 250 mL intermittent infusion     vitamin B1 (THIAMINE) tablet 100 mg

## 2020-02-15 NOTE — PROGRESS NOTES
[facility]  General Infectious Disease Progress Note     Patient:  Eliseo Tanner, Date of birth 1955, Medical record number [id number]  Date of Visit:  February 15, 2020         Assessment and Recommendations:   Problem List:  1. Bacteremia with Proteus mirabilis and Enterococcus faecalis in 2/2 bottles on 2/13. Cultures from 2/14 are negative to date. Urine culture was negative on 2/13 making a urinary source unlikely. Lines were suspected as a source and exchanged. CT C/A/P from 2/8 without any obvious intra-abdominal source.   2. Vfib arrest  3. Chronic systolic heart failure with exacerbation   4. Type 2 diabetes  5. CKD 3    Discussion:   Mr Eliseo Tanner is a 63yo M with a history of chronic systolic heart failure, NICM, moderate CAD, HTN, DM2, CKDIII who was admitted to [facility] on 2/7/20 for evaluation of heart failure. Prior to arrival to the floor he was in Vfib and required shocks x 3. He was being diuresed as expected, and has been requiring inotropes and a balloon pump. On 2/13 morning had rigors and chills. Blood cultures (2/2) were positive for Proteus mirabilis and Enterococcus. I suspect this is most likely from his lines. Lines all removed and changed on 2/13 evening and balloon pump was also exchanged. His blood cultures from 2/14 remain negative to date.     In discussion with cardiology, he will likely require LVAD placement in the very near future. We discussed the fact that Enterococcus can be somewhat slow to clear from the bloodstream so I would prefer a minimum of 48, and preferably 72 hours, of clear blood cultures prior to LVAD placement if possible.      Recommendations:  1. Change meropenem to ceftriaxone 2g daily (Proteus mirabilis is an indole negative Proteus species that is low risk for inducible ampC beta lactamase)  2. Continue vancomycin pending E faecalis sensitivities (though suspect it will be sensitive to ampicillin)  3. If necessary, LVAD  could be placed after 48 hours (preferably 72 hours) of negative blood cultures.     ID will follow.      Marlin Blanco MD  Infectious Diseases  [phone] (TextPage)           Interval History:   No further positive blood cultures, but with cardiac decompensation and likely imminent need for LVAD. WBC improving to 12.2. Afebrile since 2/13.         Review of Systems:   4 point ROS including Respiratory, CV, GI and , other than that noted in the HPI,  is negative           Current Antimicrobials   Vancomycin 2/13-present  Ceftriaxone 2/15-present  Meropenem 2/14-2/15  Tobramycin x 1 2/13  Pip/tazo 2/13-2/14         Physical Exam:   Ranges for vital signs:  Temp:  [97.5  F (36.4  C)-98.1  F (36.7  C)] 98.1  F (36.7  C)  Heart Rate:  [] 101  Resp:  [9-26] 26  MAP:  [63 mmHg-99 mmHg] 79 mmHg  Arterial Line BP: ()/(31-64) 130/44  SpO2:  [87 %-100 %] 98 %    Exam:  GENERAL:  well-developed, well-nourished, in no acute distress.   ENT:  Head is normocephalic, atraumatic. Oropharynx is moist without exudates or ulcers.  EYES:  Eyes have anicteric sclerae. PERRL.   NECK:  Supple. No cervical lymphadenopathy  LUNGS:  Normal respiratory effort.   ABDOMEN:  Non-distended.   EXT: Extremities warm and without edema.  SKIN:  No acute rashes. Multiple lines in place since 2/13. No erythema.   NEUROLOGIC:  Grossly nonfocal.  Guevara catheter in place.          Laboratory Data:     Creatinine   Date Value Ref Range Status   02/15/2020 1.16 0.66 - 1.25 mg/dL Final   02/15/2020 1.25 0.66 - 1.25 mg/dL Final   02/14/2020 1.30 (H) 0.66 - 1.25 mg/dL Final   02/14/2020 1.36 (H) 0.66 - 1.25 mg/dL Final   02/14/2020 1.42 (H) 0.66 - 1.25 mg/dL Final     WBC   Date Value Ref Range Status   02/15/2020 12.2 (H) 4.0 - 11.0 10e9/L Final   02/14/2020 13.5 (H) 4.0 - 11.0 10e9/L Final   02/14/2020 15.3 (H) 4.0 - 11.0 10e9/L Final   02/13/2020 15.9 (H) 4.0 - 11.0 10e9/L Final   02/13/2020 1.9 (L) 4.0 - 11.0 10e9/L Final     Hemoglobin    Date Value Ref Range Status   02/15/2020 13.2 (L) 13.3 - 17.7 g/dL Final     Platelet Count   Date Value Ref Range Status   02/15/2020 118 (L) 150 - 450 10e9/L Final     CRP Inflammation   Date Value Ref Range Status   02/08/2020 21.0 (H) 0.0 - 8.0 mg/L Final     AST   Date Value Ref Range Status   02/15/2020 82 (H) 0 - 45 U/L Final   02/14/2020 107 (H) 0 - 45 U/L Final   02/13/2020 83 (H) 0 - 45 U/L Final   02/13/2020 40 0 - 45 U/L Final   02/08/2020 78 (H) 0 - 45 U/L Final     ALT   Date Value Ref Range Status   02/15/2020 82 (H) 0 - 70 U/L Final   02/14/2020 83 (H) 0 - 70 U/L Final   02/13/2020 58 0 - 70 U/L Final   02/13/2020 38 0 - 70 U/L Final   02/08/2020 74 (H) 0 - 70 U/L Final     Bilirubin Total   Date Value Ref Range Status   02/15/2020 0.8 0.2 - 1.3 mg/dL Final   02/14/2020 1.4 (H) 0.2 - 1.3 mg/dL Final   02/13/2020 1.8 (H) 0.2 - 1.3 mg/dL Final   02/13/2020 0.8 0.2 - 1.3 mg/dL Final   02/08/2020 0.7 0.2 - 1.3 mg/dL Final     Lab Results   Component Value Date     02/15/2020    BUN 24 02/15/2020    CO2 24 02/15/2020       Culture data:  2/14 Blood culture pending  2/13 Blood culture: Enterococcus faecalis, sensis pending; Proteus mirabilis, widely sensitive  2/13 Urine culture negative    All cultures:  Recent Labs   Lab 02/15/20  1422 02/14/20  0919 02/13/20  0353 02/13/20  0350 02/13/20  0331   CULT PENDING  PENDING No growth after 1 day Cultured on the 1st day of incubation:  Proteus mirabilis  Susceptibility testing done on previous specimen  *  Critical Value/Significant Value, preliminary result only, called to and read back by  Mima Cabrera RN. @1426. 2.13.20. BS.     Cultured on the 1st day of incubation:  Enterococcus faecalis  Susceptibility testing done on previous specimen  *  Critical Value/Significant Value, preliminary result only, called to and read back by  Alisson Castillo RN on 2.13.20 at 1728.  JRT    (Note)  POSITIVE for ENTEROCOCCUS FAECALIS and NEGATIVE for  Ernst/vanB genes  by Verigene multiplex nucleic acid test. Final identification and  antimicrobial susceptibility testing will be verified by standard  methods.    Specimen tested with Verigene multiplex, gram-positive blood culture  nucleic acid test for the following targets: Staph aureus, Staph  epidermidis, Staph lugdunensis, other Staph species, Enterococcus  faecalis, Enterococcus faecium, Streptococcus species, S. agalactiae,  S. anginosus grp., S. pneumoniae, S. pyogenes, Listeria sp., mecA  (methicillin resistance) and Ernst/B (vancomycin resistance).    Critical Value/Significant Value called to and read back by MARIA EUGENIA MILLAN RN 2/13/20 2036 EH       Cultured on the 1st day of incubation:  Proteus mirabilis  *  Critical Value/Significant Value, preliminary result only, called to and read back by  Mima Cabrera RN. @1426. 2.13.20. BS.     Cultured on the 1st day of incubation:  Enterococcus faecalis  Susceptibility testing in progress  *  Critical Value/Significant Value, preliminary result only, called to and read back by  Alisson Leon RN on 2.13.20 at 1728.  JRT    (Note)  POSITIVE for PROTEUS SPECIES by Verigene multiplex nucleic acid test.  Final identification and antimicrobial susceptibility testing will be  verified by standard methods.    Specimen tested with Verigene multiplex, gram-negative blood culture  nucleic acid test for the following targets: Acinetobacter sp.,  Citrobacter sp., Enterobacter sp., Proteus sp., E. coli, K.  pneumoniae/oxytoca, P. aeruginosa, and the following resistance  markers: CTXM, KPC, NDM, VIM, IMP and OXA.    Critical Value/Significant Value called to and read back by  Alisson Leon RN @ 1650. 2/13/20. AV   No growth

## 2020-02-16 ENCOUNTER — APPOINTMENT (OUTPATIENT)
Dept: GENERAL RADIOLOGY | Facility: CLINIC | Age: 65
DRG: 001 | End: 2020-02-16
Attending: STUDENT IN AN ORGANIZED HEALTH CARE EDUCATION/TRAINING PROGRAM
Payer: MEDICARE

## 2020-02-16 ENCOUNTER — APPOINTMENT (OUTPATIENT)
Dept: LAB | Facility: CLINIC | Age: 65
End: 2020-02-16
Payer: MEDICARE

## 2020-02-16 LAB
ALBUMIN SERPL-MCNC: 2.8 G/DL (ref 3.4–5)
ALP SERPL-CCNC: 124 U/L (ref 40–150)
ALT SERPL W P-5'-P-CCNC: 44 U/L (ref 0–70)
ANION GAP SERPL CALCULATED.3IONS-SCNC: 3 MMOL/L (ref 3–14)
ANION GAP SERPL CALCULATED.3IONS-SCNC: 5 MMOL/L (ref 3–14)
ANION GAP SERPL CALCULATED.3IONS-SCNC: 9 MMOL/L (ref 3–14)
APTT PPP: 43 SEC (ref 22–37)
APTT PPP: 50 SEC (ref 22–37)
AST SERPL W P-5'-P-CCNC: 37 U/L (ref 0–45)
BACTERIA SPEC CULT: ABNORMAL
BASE EXCESS BLDV CALC-SCNC: 1.3 MMOL/L
BASE EXCESS BLDV CALC-SCNC: 10 MMOL/L
BASE EXCESS BLDV CALC-SCNC: 10.8 MMOL/L
BASE EXCESS BLDV CALC-SCNC: 6.5 MMOL/L
BASE EXCESS BLDV CALC-SCNC: 6.7 MMOL/L
BASE EXCESS BLDV CALC-SCNC: 8.8 MMOL/L
BILIRUB SERPL-MCNC: 0.5 MG/DL (ref 0.2–1.3)
BLD PROD DISPENSED VOL BPU: 3100 ML
BLD PROD DISPENSED VOL BPU: 3100 ML
BLD PROD TYP BPU: NORMAL
BLD UNIT ID BPU: 0
BLOOD PRODUCT CODE: NORMAL
BPU ID: NORMAL
BUN SERPL-MCNC: 37 MG/DL (ref 7–30)
CA-I BLD-MCNC: 4.4 MG/DL (ref 4.4–5.2)
CA-I BLD-MCNC: 5.1 MG/DL (ref 4.4–5.2)
CALCIUM SERPL-MCNC: 10.1 MG/DL (ref 8.5–10.1)
CALCIUM SERPL-MCNC: 10.4 MG/DL (ref 8.5–10.1)
CALCIUM SERPL-MCNC: 9.2 MG/DL (ref 8.5–10.1)
CHLORIDE SERPL-SCNC: 100 MMOL/L (ref 94–109)
CHLORIDE SERPL-SCNC: 101 MMOL/L (ref 94–109)
CHLORIDE SERPL-SCNC: 104 MMOL/L (ref 94–109)
CO2 SERPL-SCNC: 29 MMOL/L (ref 20–32)
CO2 SERPL-SCNC: 31 MMOL/L (ref 20–32)
CO2 SERPL-SCNC: 34 MMOL/L (ref 20–32)
CREAT SERPL-MCNC: 1.61 MG/DL (ref 0.66–1.25)
CREAT SERPL-MCNC: 1.63 MG/DL (ref 0.66–1.25)
CREAT SERPL-MCNC: 1.7 MG/DL (ref 0.66–1.25)
ERYTHROCYTE [DISTWIDTH] IN BLOOD BY AUTOMATED COUNT: 18.8 % (ref 10–15)
FIBRINOGEN PPP-MCNC: 377 MG/DL (ref 200–420)
GFR SERPL CREATININE-BSD FRML MDRD: 41 ML/MIN/{1.73_M2}
GFR SERPL CREATININE-BSD FRML MDRD: 43 ML/MIN/{1.73_M2}
GFR SERPL CREATININE-BSD FRML MDRD: 44 ML/MIN/{1.73_M2}
GLUCOSE BLDC GLUCOMTR-MCNC: 122 MG/DL (ref 70–99)
GLUCOSE BLDC GLUCOMTR-MCNC: 222 MG/DL (ref 70–99)
GLUCOSE SERPL-MCNC: 148 MG/DL (ref 70–99)
GLUCOSE SERPL-MCNC: 195 MG/DL (ref 70–99)
GLUCOSE SERPL-MCNC: 195 MG/DL (ref 70–99)
HCO3 BLDV-SCNC: 26 MMOL/L (ref 21–28)
HCO3 BLDV-SCNC: 33 MMOL/L (ref 21–28)
HCO3 BLDV-SCNC: 34 MMOL/L (ref 21–28)
HCO3 BLDV-SCNC: 35 MMOL/L (ref 21–28)
HCO3 BLDV-SCNC: 36 MMOL/L (ref 21–28)
HCO3 BLDV-SCNC: 37 MMOL/L (ref 21–28)
HCT VFR BLD AUTO: 41.8 % (ref 40–53)
HGB BLD-MCNC: 13.4 G/DL (ref 13.3–17.7)
INR PPP: 1.33 (ref 0.86–1.14)
LDH SERPL L TO P-CCNC: 337 U/L (ref 85–227)
MAGNESIUM SERPL-MCNC: 2 MG/DL (ref 1.6–2.3)
MCH RBC QN AUTO: 28.7 PG (ref 26.5–33)
MCHC RBC AUTO-ENTMCNC: 32.1 G/DL (ref 31.5–36.5)
MCV RBC AUTO: 90 FL (ref 78–100)
NUM BPU REQUESTED: 0
NUM BPU REQUESTED: 11
O2/TOTAL GAS SETTING VFR VENT: 21 %
O2/TOTAL GAS SETTING VFR VENT: 3 %
O2/TOTAL GAS SETTING VFR VENT: 3 %
OXYHGB MFR BLDV: 53 %
OXYHGB MFR BLDV: 56 %
OXYHGB MFR BLDV: 59 %
OXYHGB MFR BLDV: 60 %
OXYHGB MFR BLDV: 62 %
OXYHGB MFR BLDV: 63 %
PCO2 BLDV: 43 MM HG (ref 40–50)
PCO2 BLDV: 51 MM HG (ref 40–50)
PCO2 BLDV: 53 MM HG (ref 40–50)
PCO2 BLDV: 53 MM HG (ref 40–50)
PCO2 BLDV: 54 MM HG (ref 40–50)
PCO2 BLDV: 56 MM HG (ref 40–50)
PH BLDV: 7.39 PH (ref 7.32–7.43)
PH BLDV: 7.4 PH (ref 7.32–7.43)
PH BLDV: 7.41 PH (ref 7.32–7.43)
PH BLDV: 7.43 PH (ref 7.32–7.43)
PH BLDV: 7.44 PH (ref 7.32–7.43)
PH BLDV: 7.44 PH (ref 7.32–7.43)
PHOSPHATE SERPL-MCNC: 4.7 MG/DL (ref 2.5–4.5)
PLATELET # BLD AUTO: 143 10E9/L (ref 150–450)
PO2 BLDV: 31 MM HG (ref 25–47)
PO2 BLDV: 32 MM HG (ref 25–47)
PO2 BLDV: 33 MM HG (ref 25–47)
PO2 BLDV: 34 MM HG (ref 25–47)
PO2 BLDV: 35 MM HG (ref 25–47)
PO2 BLDV: 36 MM HG (ref 25–47)
POTASSIUM SERPL-SCNC: 3.4 MMOL/L (ref 3.4–5.3)
POTASSIUM SERPL-SCNC: 3.8 MMOL/L (ref 3.4–5.3)
POTASSIUM SERPL-SCNC: 3.8 MMOL/L (ref 3.4–5.3)
PROT SERPL-MCNC: 6.5 G/DL (ref 6.8–8.8)
RBC # BLD AUTO: 4.67 10E12/L (ref 4.4–5.9)
SODIUM SERPL-SCNC: 137 MMOL/L (ref 133–144)
SODIUM SERPL-SCNC: 138 MMOL/L (ref 133–144)
SODIUM SERPL-SCNC: 139 MMOL/L (ref 133–144)
SPECIMEN SOURCE: ABNORMAL
SPECIMEN SOURCE: ABNORMAL
TRANSFUSION STATUS PATIENT QL: NORMAL
VANCOMYCIN SERPL-MCNC: 18.5 MG/L
WBC # BLD AUTO: 13.1 10E9/L (ref 4–11)

## 2020-02-16 PROCEDURE — 71045 X-RAY EXAM CHEST 1 VIEW: CPT

## 2020-02-16 PROCEDURE — 25000128 H RX IP 250 OP 636: Performed by: STUDENT IN AN ORGANIZED HEALTH CARE EDUCATION/TRAINING PROGRAM

## 2020-02-16 PROCEDURE — 87040 BLOOD CULTURE FOR BACTERIA: CPT | Performed by: STUDENT IN AN ORGANIZED HEALTH CARE EDUCATION/TRAINING PROGRAM

## 2020-02-16 PROCEDURE — 36415 COLL VENOUS BLD VENIPUNCTURE: CPT | Performed by: STUDENT IN AN ORGANIZED HEALTH CARE EDUCATION/TRAINING PROGRAM

## 2020-02-16 PROCEDURE — 82330 ASSAY OF CALCIUM: CPT | Performed by: INTERNAL MEDICINE

## 2020-02-16 PROCEDURE — 80048 BASIC METABOLIC PNL TOTAL CA: CPT | Performed by: INTERNAL MEDICINE

## 2020-02-16 PROCEDURE — 40000275 ZZH STATISTIC RCP TIME EA 10 MIN

## 2020-02-16 PROCEDURE — 25800030 ZZH RX IP 258 OP 636: Performed by: STUDENT IN AN ORGANIZED HEALTH CARE EDUCATION/TRAINING PROGRAM

## 2020-02-16 PROCEDURE — 82330 ASSAY OF CALCIUM: CPT | Performed by: STUDENT IN AN ORGANIZED HEALTH CARE EDUCATION/TRAINING PROGRAM

## 2020-02-16 PROCEDURE — 25000128 H RX IP 250 OP 636: Performed by: INTERNAL MEDICINE

## 2020-02-16 PROCEDURE — 25000125 ZZHC RX 250: Performed by: PATHOLOGY

## 2020-02-16 PROCEDURE — P9059 PLASMA, FRZ BETWEEN 8-24HOUR: HCPCS | Performed by: PATHOLOGY

## 2020-02-16 PROCEDURE — 80202 ASSAY OF VANCOMYCIN: CPT | Performed by: INTERNAL MEDICINE

## 2020-02-16 PROCEDURE — 83735 ASSAY OF MAGNESIUM: CPT | Performed by: INTERNAL MEDICINE

## 2020-02-16 PROCEDURE — 83615 LACTATE (LD) (LDH) ENZYME: CPT | Performed by: STUDENT IN AN ORGANIZED HEALTH CARE EDUCATION/TRAINING PROGRAM

## 2020-02-16 PROCEDURE — 84100 ASSAY OF PHOSPHORUS: CPT | Performed by: INTERNAL MEDICINE

## 2020-02-16 PROCEDURE — 99291 CRITICAL CARE FIRST HOUR: CPT | Mod: GC | Performed by: INTERNAL MEDICINE

## 2020-02-16 PROCEDURE — 85384 FIBRINOGEN ACTIVITY: CPT | Performed by: STUDENT IN AN ORGANIZED HEALTH CARE EDUCATION/TRAINING PROGRAM

## 2020-02-16 PROCEDURE — 25000132 ZZH RX MED GY IP 250 OP 250 PS 637: Mod: GY | Performed by: STUDENT IN AN ORGANIZED HEALTH CARE EDUCATION/TRAINING PROGRAM

## 2020-02-16 PROCEDURE — 40000196 ZZH STATISTIC RAPCV CVP MONITORING

## 2020-02-16 PROCEDURE — 40000014 ZZH STATISTIC ARTERIAL MONITORING DAILY

## 2020-02-16 PROCEDURE — 00000146 ZZHCL STATISTIC GLUCOSE BY METER IP

## 2020-02-16 PROCEDURE — 40000344 ZZHCL STATISTIC THAWING COMPONENT: Performed by: PATHOLOGY

## 2020-02-16 PROCEDURE — 40000048 ZZH STATISTIC DAILY SWAN MONITORING

## 2020-02-16 PROCEDURE — 85027 COMPLETE CBC AUTOMATED: CPT | Performed by: INTERNAL MEDICINE

## 2020-02-16 PROCEDURE — 20000004 ZZH R&B ICU UMMC

## 2020-02-16 PROCEDURE — 85610 PROTHROMBIN TIME: CPT | Performed by: STUDENT IN AN ORGANIZED HEALTH CARE EDUCATION/TRAINING PROGRAM

## 2020-02-16 PROCEDURE — 82805 BLOOD GASES W/O2 SATURATION: CPT | Performed by: STUDENT IN AN ORGANIZED HEALTH CARE EDUCATION/TRAINING PROGRAM

## 2020-02-16 PROCEDURE — 36514 APHERESIS PLASMA: CPT

## 2020-02-16 PROCEDURE — 40000076 ZZH STATISTIC IABP MONITORING

## 2020-02-16 PROCEDURE — 85730 THROMBOPLASTIN TIME PARTIAL: CPT | Performed by: STUDENT IN AN ORGANIZED HEALTH CARE EDUCATION/TRAINING PROGRAM

## 2020-02-16 PROCEDURE — 80053 COMPREHEN METABOLIC PANEL: CPT | Performed by: INTERNAL MEDICINE

## 2020-02-16 PROCEDURE — 85730 THROMBOPLASTIN TIME PARTIAL: CPT | Performed by: INTERNAL MEDICINE

## 2020-02-16 PROCEDURE — 25000128 H RX IP 250 OP 636: Performed by: PATHOLOGY

## 2020-02-16 PROCEDURE — 25000132 ZZH RX MED GY IP 250 OP 250 PS 637: Mod: GY

## 2020-02-16 PROCEDURE — 25800030 ZZH RX IP 258 OP 636: Performed by: INTERNAL MEDICINE

## 2020-02-16 PROCEDURE — 25000132 ZZH RX MED GY IP 250 OP 250 PS 637: Mod: GY | Performed by: INTERNAL MEDICINE

## 2020-02-16 RX ORDER — CALCIUM GLUCONATE 100 MG/ML
AMPUL (ML) INTRAVENOUS
Status: COMPLETED | OUTPATIENT
Start: 2020-02-16 | End: 2020-02-16

## 2020-02-16 RX ADMIN — SENNOSIDES AND DOCUSATE SODIUM 2 TABLET: 8.6; 5 TABLET ORAL at 20:06

## 2020-02-16 RX ADMIN — INSULIN GLARGINE 10 UNITS: 100 INJECTION, SOLUTION SUBCUTANEOUS at 22:29

## 2020-02-16 RX ADMIN — ALLOPURINOL 200 MG: 100 TABLET ORAL at 07:51

## 2020-02-16 RX ADMIN — MAGNESIUM OXIDE 400 MG: 400 TABLET ORAL at 07:51

## 2020-02-16 RX ADMIN — ANTICOAGULANT CITRATE DEXTROSE SOLUTION FORMULA A 3 ML: 12.25; 11; 3.65 SOLUTION INTRAVENOUS at 11:48

## 2020-02-16 RX ADMIN — VANCOMYCIN HYDROCHLORIDE 2000 MG: 1 INJECTION, POWDER, LYOPHILIZED, FOR SOLUTION INTRAVENOUS at 17:01

## 2020-02-16 RX ADMIN — DICLOFENAC 4 G: 10 GEL TOPICAL at 20:08

## 2020-02-16 RX ADMIN — ATORVASTATIN CALCIUM 20 MG: 20 TABLET, FILM COATED ORAL at 20:06

## 2020-02-16 RX ADMIN — SENNOSIDES AND DOCUSATE SODIUM 2 TABLET: 8.6; 5 TABLET ORAL at 07:51

## 2020-02-16 RX ADMIN — THIAMINE HCL TAB 100 MG 100 MG: 100 TAB at 07:51

## 2020-02-16 RX ADMIN — DOBUTAMINE 10 MCG/KG/MIN: 12.5 INJECTION, SOLUTION INTRAVENOUS at 22:25

## 2020-02-16 RX ADMIN — FLUOXETINE 20 MG: 20 CAPSULE ORAL at 07:51

## 2020-02-16 RX ADMIN — OMEPRAZOLE 20 MG: 20 CAPSULE, DELAYED RELEASE ORAL at 07:51

## 2020-02-16 RX ADMIN — Medication 200 MG: at 07:50

## 2020-02-16 RX ADMIN — ANTICOAGULANT CITRATE DEXTROSE SOLUTION FORMULA A 839 ML: 12.25; 11; 3.65 SOLUTION INTRAVENOUS at 10:06

## 2020-02-16 RX ADMIN — BIVALIRUDIN 0.02 MG/KG/HR: 250 INJECTION, POWDER, LYOPHILIZED, FOR SOLUTION INTRAVENOUS at 17:00

## 2020-02-16 RX ADMIN — ASPIRIN 81 MG CHEWABLE TABLET 81 MG: 81 TABLET CHEWABLE at 07:51

## 2020-02-16 RX ADMIN — POTASSIUM CHLORIDE 20 MEQ: 29.8 INJECTION, SOLUTION INTRAVENOUS at 05:08

## 2020-02-16 RX ADMIN — CEFTRIAXONE 2 G: 2 INJECTION, POWDER, FOR SOLUTION INTRAMUSCULAR; INTRAVENOUS at 14:52

## 2020-02-16 RX ADMIN — DOPAMINE HYDROCHLORIDE 5 MCG/KG/MIN: 320 INJECTION, SOLUTION INTRAVENOUS at 07:54

## 2020-02-16 RX ADMIN — CALCIUM GLUCONATE 6.8 G: 98 INJECTION, SOLUTION INTRAVENOUS at 10:08

## 2020-02-16 RX ADMIN — AMIODARONE HYDROCHLORIDE 400 MG: 200 TABLET ORAL at 07:51

## 2020-02-16 RX ADMIN — ANTICOAGULANT CITRATE DEXTROSE SOLUTION FORMULA A 3 ML: 12.25; 11; 3.65 SOLUTION INTRAVENOUS at 11:47

## 2020-02-16 RX ADMIN — DOBUTAMINE 10 MCG/KG/MIN: 12.5 INJECTION, SOLUTION INTRAVENOUS at 07:54

## 2020-02-16 ASSESSMENT — ACTIVITIES OF DAILY LIVING (ADL)
ADLS_ACUITY_SCORE: 16

## 2020-02-16 ASSESSMENT — MIFFLIN-ST. JEOR: SCORE: 1697.63

## 2020-02-16 NOTE — PROGRESS NOTES
Major Shift Events: IABP remains 1:1. Svo2 62 this AM. CVP 10 this AM. Dobutamine @ 10, Dopamine @ 5. Angiomax @ 0.02. Guevara with adequate UOP. Creat 1.68 this AM. 20 of K+ given per orders.   Plan: LVAD this week.   For vital signs and complete assessments, please see documentation flowsheets.

## 2020-02-16 NOTE — PROGRESS NOTES
Nephrology Progress Note  02/16/2020         Mr Eliseo Tanner is a 63yo M with  H/O  Stage D Class IV HF,NICM( LVEF 15-20 %) moderate CAD, HTN, DM2, CKD, admitted to [facility] on 2/7/20 with Cardiogenic shock and V fib with multiple ICD shocks. Patient is not responding to diuresis , on pressure support . Developed rigors and there is concern for septic shock .  Nephrology consulted for BERNARDA, started CRRT 2/13 for volume management.       Interval History :   Mr Tanner had CRRT stopped yesterday, made 4L of UOP with diuretics and was 4L net negative with last CVP at goal around ~10mmHg.  Following but not suspecting he will need RRT in the near future as it was started solely for volume management which he is achieving with diuretics for now.  Likely sign-off tomorrow but will review labs now that RRT will be off all day.       Assessment & Recommendations:   BERNARDA on CKD stage 3-Baseline Cr 1.5-2, up to 2.2 at time of consult with decreasing UOP in setting of cardiogenic shock despite diuretics.  He has underlying CKD likely from T2DM and HTN . Follows with Dr Jmibo Vila, Nephrologist  [facility] in [place], [place] . Review of his note shows that patient has CKD stage 3 ( baseline Cr 1.3 - 1.5) with Non nephrotic range proteinuria , MGUS, Chronic gout, HTN.  Etiology of BERNARDA felt to be CRS with likely some degree of ATN with septic/distributive shock.  CRRT was started on 2/13 for volume management, pulling 0-50cc/hr with both inotrope and IABP support.                 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                 -Holding on RRT with improved UOP on diuretics and no issues with chemistries.       Volume status-Net negative 4L yesterday with diuresis and 1/2 day of CRRT with UOP being the main contribution.  Holding on RRT.       Electrolytes/pH-K 3.4, bicarb 31, no acute issues on CRRT.       Ca/phos/pth-Ca 9.2, Mg and Phos reasonable, on CRRT.         Anemia-Hgb 13.4, no acute  "issues.      Nutrition-Taking some PO.       Seen and discussed with Dr Pineda     Recommendations were communicated to primary team via verbal communication.        JUAN Avery CNS  Clinical Nurse Specialist  [phone]    Review of Systems:   I reviewed the following systems:  Gen: No fevers or chills  CV: No CP at rest  Resp: No SOB at rest  GI: No N/V    Physical Exam:   I/O last 3 completed shifts:  In: 2468.87 [P.O.:840; I.V.:1628.87]  Out: 5598 [Urine:3865; Other:1733]   BP (!) 86/58   Pulse 102   Temp 97.7  F (36.5  C) (Axillary)   Resp 26   Ht 1.702 m (5' 7\")   Wt 95.9 kg (211 lb 6.7 oz)   SpO2 96%   BMI 33.11 kg/m       GENERAL APPEARANCE: no distress, he is  awake  EYES: no scleral icterus, pupils equal  Endo: no goiter, no moon facies  Lymphatics: no cervical or supraclavicular LAD  Pulmonary: lungs clear to auscultation with equal breath sounds bilaterally, no clubbing  CV: regular rhythm, normal rate, no rub   - JVD no -   Edema 1+ pedal edema   GI: soft, nontender, normal bowel sounds  MS: no evidence of inflammation in joints, no muscle tenderness  : has  lombardo  SKIN: no rash, warm, dry, no cyanosis  NEURO: face symmetric, no asterixis        Labs:   All labs reviewed by me  Electrolytes/Renal -   Recent Labs   Lab Test 02/16/20  0400 02/15/20  2149 02/15/20  1658 02/15/20  1034    135 136 136   POTASSIUM 3.4 3.8 3.7 4.1   CHLORIDE 104 101 103 105   CO2 31 31 26 24   BUN 37* 33* 28 24   CR 1.63* 1.48* 1.34* 1.16   * 224* 241* 116*   CALOS 9.2 10.0 11.3* 8.5   MAG 2.0  --  2.2 2.2   PHOS 4.7*  --  4.1 3.8       CBC -   Recent Labs   Lab Test 02/16/20  0400 02/15/20  1658 02/15/20  1354 02/15/20  0328   WBC 13.1* 12.2*  --  12.2*   HGB 13.4 13.6 13.2* 13.2*   * 126*  --  118*       LFTs -   Recent Labs   Lab Test 02/16/20  0400 02/15/20  1658 02/15/20  0328   ALKPHOS 124 121 146   BILITOTAL 0.5 0.6 0.8   ALT 44 47 82*   AST 37 43 82*   PROTTOTAL 6.5* 6.5* 7.0 "   ALBUMIN 2.8* 2.8* 2.5*       Iron Panel -   Recent Labs   Lab Test 02/08/20  0408   IRON 25*   IRONSAT 8*   HEAVENLY 189           Current Medications:    allopurinol  200 mg Oral Daily     amiodarone  400 mg Oral Daily     anticoagulant citrate   Apheresis During Apheresis (from stock)     anticoagulant citrate  3 mL Intracatheter Once     anticoagulant citrate  3 mL Intracatheter Once     aspirin  81 mg Oral Daily     atorvastatin  20 mg Oral QPM     cefTRIAXone  2 g Intravenous Q24H     co-enzyme Q-10  200 mg Oral Daily     diclofenac  4 g Topical 4x Daily     FLUoxetine  20 mg Oral Daily     insulin aspart  1-7 Units Subcutaneous TID AC     insulin aspart  1-5 Units Subcutaneous At Bedtime     insulin glargine  10 Units Subcutaneous At Bedtime     magnesium oxide  400 mg Oral Daily     omeprazole  20 mg Oral QAM AC     senna-docusate  1 tablet Oral or Feeding Tube BID    Or     senna-docusate  2 tablet Oral or Feeding Tube BID     sodium chloride (PF)  3 mL Intracatheter Q8H     vancomycin place cheng - receiving intermittent dosing  1 each Does not apply See Admin Instructions     vitamin B1  100 mg Oral Daily       bivalirudin ANTICOAGULANT (ANGIOMAX) 250mg/250mL in 0.9% Sodium Chloride 0.024 mg/kg/hr (02/16/20 0920)     CRRT replacement solution 12.5 mL/kg/hr (02/15/20 1227)     DOBUTamine 10 mcg/kg/min (02/16/20 0919)     DOPamine 5 mcg/kg/min (02/16/20 0919)     - MEDICATION INSTRUCTIONS -       - MEDICATION INSTRUCTIONS -       CRRT replacement solution 1.837 mL/kg/hr (02/15/20 0330)     CRRT replacement solution 12.5 mL/kg/hr (02/15/20 1227)     Patient was seen by myself, Dr. Yosvany Pineda, in conjunction with Blake Hopper APRN CNS as part of a shared visit.    I personally reviewed past medical and surgical history, vital signs, medications and labs.  Present and past medical history, along with significant physical exam findings were all reviewed with LISA.      Key management decisions made by me  and discussed with LISA:  Off crrt, will continue to monitor.

## 2020-02-16 NOTE — CONSULTS
HEMATOLOGY CONSULT    Reason for consult: Possible heparin-induced thrombocytopenia.    Chart and labs reviewed, patient seen / examined.    Eliseo Tanner is a 64-year-old male with nonischemic cardiomyopathy who presented in cardiogenic shock and is being evaluated for LVAD and possible cardiac transplant.  Since admission on February 6, he has remained critically ill and is being managed with a balloon pump, inotropic support, and still has ongoing evidence of cardiogenic shock.  In addition, he developed bacteremia with Proteus and Enterococcus in more than one blood culture from February 13.    At the time of his admission on February 6, his platelet count was normal at approximately 250,000.  The platelet count started to decline between February 8 and 9.  Note however that he had been previously hospitalized from January 29 through January 31 at an outside hospital where he had documented exposure to unfractionated heparin.  He has been exposed to heparin here since February 7 except for a brief period between February 11 and February 12 where heparin was stopped and switched to argatroban due to concern about heparin-induced thrombocytopenia.  However a HIT antibody test was negative and he was switched back to heparin.  Repeat HIT antibody testing on February 14 was positive.  At that time he was switched to bivalirudin and over the last 24 to 48 hours his platelet count has improved from approximately 90,000 to 120,000.  Serotonin release assay has been sent and is pending.  Plasma exchange to remove heparin platelet antibodies has been initiated today given that he will likely be proceeding to LVAD placement very soon.      ASSESSMENT / PLAN:  Possible heparin-induced thrombocytopenia.  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  Agree with current management with a non-heparin anticoagulant and initiation of plasma exchange while we wait for the results of the  serotonin release assay.  Given his bacteremia, would also keep DIC in the differential and recommend checking a full coagulation panel with morning labs including fibrinogen.  We will continue to follow.  Please call with questions.      Reilly James MD  Associate Professor of Medicine  Division of Hematology, Oncology, and Transplantation  Director, [facility]      Total time 80 minutes, all in counseling and coordination of care.

## 2020-02-16 NOTE — PLAN OF CARE
Major Shift Events: condition stable, plasmapheresis performed  Plan: continue to monitor  For vital signs and complete assessments, please see documentation flowsheets.

## 2020-02-16 NOTE — PHARMACY-VANCOMYCIN DOSING SERVICE
Pharmacy Vancomycin Note  Date of Service 2020  Patient's  1955   64 year old, male    Indication: Bacteremia  Goal Trough Level: 15-20 mg/L  Day of Therapy: 3  Current Vancomycin regimen:  2000 mg IV q24h    Current estimated CrCl = Estimated Creatinine Clearance: 47.2 mL/min (A) (based on SCr of 1.7 mg/dL (H)).    Creatinine for last 3 days  2020:  4:59 PM Creatinine 2.21 mg/dL;  9:47 PM Creatinine 2.31 mg/dL  2020:  3:31 AM Creatinine 1.86 mg/dL; 10:58 AM Creatinine 1.56 mg/dL;  3:30 PM Creatinine 1.42 mg/dL;  5:07 PM Creatinine 1.36 mg/dL; 10:08 PM Creatinine 1.30 mg/dL  2/15/2020:  3:28 AM Creatinine 1.25 mg/dL; 10:34 AM Creatinine 1.16 mg/dL;  4:58 PM Creatinine 1.34 mg/dL;  9:49 PM Creatinine 1.48 mg/dL  2020:  4:00 AM Creatinine 1.63 mg/dL; 10:11 AM Creatinine 1.70 mg/dL    Recent Vancomycin Levels (past 3 days)  2020:  3:31 AM Vancomycin Level 16.2 mg/L  2020:  4:00 AM Vancomycin Level 18.5 mg/L    Vancomycin IV Administrations (past 72 hours)                   vancomycin (VANCOCIN) 2,000 mg in sodium chloride 0.9 % 250 mL intermittent infusion (mg) 2,000 mg New Bag 02/15/20 0802     2,000 mg New Bag 20 0817                Nephrotoxins and other renal medications (From now, onward)    Start     Dose/Rate Route Frequency Ordered Stop    02/15/20 1437  vancomycin place cheng - receiving intermittent dosing      1 each Does not apply SEE ADMIN INSTRUCTIONS 02/15/20 1438      20 1800  DOBUTamine (DOBUTREX) 1,000 mg in D5W 250 mL infusion (adult max conc)      10 mcg/kg/min × 108.9 kg (Dosing Weight)  16.3 mL/hr  Intravenous CONTINUOUS 20 1757               Contrast Orders - past 72 hours (72h ago, onward)    None          Interpretation of levels and current regimen:  Trough level is Therapeutic    Has serum creatinine changed > 50% in last 72 hours: Yes    Urine output:  good urine output with diuresis and inotropes    Renal Function: Improving  (CRRT d/cd 2/15)    Plan:  1.  Continue Current Dose  2.  Pharmacy will check trough levels as appropriate in 1-3 Days.    3. Serum creatinine levels will be ordered daily for the first week of therapy and at least twice weekly for subsequent weeks.      Ina Adan RPH        .

## 2020-02-16 NOTE — PROCEDURES
Laboratory Medicine and Pathology  Transfusion Medicine - Apheresis Procedure    Eliseo Tanner MRN# [id number]   YOB: 1955 Age: 64 year old   Date of Admission: 2020     Reason for consult: Concern for HIT and imminent surgery (LVAD)           Assessment and Plan:   The patient is a 64 you male with chronic systolic heart failure, NICM, moderate CAD, HTN, ABHINAV on CPAP, DM2, CKDIII who is transferred to [facility] for evaluation and management of advanced heart failure with arrhythmia.  The patient will likely move forward with LVAD placement, and concern for HIT arose with drop in platelet count coinciding with use of heparin.  HIT screen positive on 20.  Serotonin release assay pending.  Team requesting series of therapeutic plasma exchanges (TPE), tentative plan for 5 TPEs.    The patient is tolerating TPE #2 this AM.  No concerns roughly 1/2 through the TPE.  No signs/symptoms of hypocalcemia secondary to citrate-based anticoagulant.  Monitoring ionized calcium and replacing with calcium gluconate.  Continue with plan.  Next TPE scheduled for tomorrow AM.      Treatment plan:  Plan for series of 5 daily TPEs.  The patient plasma volume is 3,100 mL.  We will use plasma as exchange fluid.  We will monitor the heparin antibodies using the HIT ELAINE pre- and post-TPE.         Please do not start or continue ACE inhibitors throughout the duration of a TPE series.  ACE inhibitors can elicit bradykinin-based reactions (hypotension, flushing, etc) in the setting of TPE.  Also, it is noted that the patient is receiving ceftriaxone.  Please do not administer ceftriaxone concurrent with TPE.  Either dose ceftriaxone before of after each TPE.  While the experience is primarily in the , concurrent ceftriaxone and TPE (with calcium gluconate to offset citrate-induced hypocalcemia) can cause crystal formation and deposition in tissues.            Chief Complaint:   Transfusion Medicine/Apheresis  consult           Past Medical History:   No past medical history on file.  -chronic systolic heart failure,   -NICM,   -moderate CAD,   -HTN,   -ABHINAV on CPAP,   -DM2,   -CKDIII          Past Surgical History:     Past Surgical History:   Procedure Laterality Date     CV CENTRAL VENOUS CATHETER PLACEMENT N/A 2/13/2020    Procedure: Central Venous Catheter Placement;  Surgeon: Chente Moss MD;  Location:  HEART CARDIAC CATH LAB     CV INTRA-AORTIC BALLOON PUMP INSERTION N/A 2/7/2020    Procedure: Intra-Aortic Balloon Pump Insertion;  Surgeon: Jose Baldwin MD;  Location:  HEART CARDIAC CATH LAB     CV INTRA-AORTIC BALLOON PUMP INSERTION N/A 2/13/2020    Procedure: Intra-Aortic Balloon Pump Insertion;  Surgeon: Chente Moss MD;  Location:  HEART CARDIAC CATH LAB     CV SWAN LUCIANA PROCEDURE N/A 2/13/2020    Procedure: [place] Luciana Procedure;  Surgeon: Chente Moss MD;  Location:  HEART CARDIAC CATH LAB            Social History:   .          Family History:   No family history on file.         Immunizations:     There is no immunization history on file for this patient.          Allergies:        Allergies   Allergen Reactions     Heparin      HIT screen positive 2/14/20, reflex DAVINA in process____  HIT screen negative 2/11/20     Oxycodone Itching and Other (See Comments)             Medications:     Current Facility-Administered Medications   Medication     acetaminophen (TYLENOL) tablet 650 mg     albuterol (PROAIR HFA/PROVENTIL HFA/VENTOLIN HFA) 108 (90 Base) MCG/ACT inhaler 2 puff     allopurinol (ZYLOPRIM) tablet 200 mg     amiodarone (PACERONE) tablet 400 mg     anticoagulant citrate flush 3 mL     anticoagulant citrate flush 3 mL     aspirin (ASA) chewable tablet 81 mg     atorvastatin (LIPITOR) tablet 20 mg     bisacodyl (DULCOLAX) EC tablet 5 mg    Or     bisacodyl (DULCOLAX) EC tablet 10 mg    Or     bisacodyl (DULCOLAX) EC tablet 15 mg      bivalirudin (ANGIOMAX) 250 mg in sodium chloride 0.9 % 250 mL ANTICOAGULANT infusion     calcium carbonate (TUMS) chewable tablet 500 mg     cefTRIAXone (ROCEPHIN) 2 g vial to attach to  ml bag for ADULTS or NS 50 ml bag for PEDS     co-enzyme Q-10 capsule 200 mg     glucose gel 15-30 g    Or     dextrose 50 % injection 25-50 mL    Or     glucagon injection 1 mg     diclofenac (VOLTAREN) 1 % topical gel 4 g     DOBUTamine (DOBUTREX) 1,000 mg in D5W 250 mL infusion (adult max conc)     DOPamine 800 mg in dextrose 5% 250 mL (adult max conc) - premix     fentaNYL (PF) (SUBLIMAZE) injection 25 mcg     FLUoxetine (PROzac) capsule 20 mg     insulin aspart (NovoLOG) inj (RAPID ACTING)     insulin aspart (NovoLOG) inj (RAPID ACTING)     insulin glargine (LANTUS PEN) injection 10 Units     lidocaine (LMX4) cream     lidocaine 1 % 0.1-1 mL     magnesium oxide (MAG-OX) tablet 400 mg     medication instruction     melatonin tablet 3 mg     naloxone (NARCAN) injection 0.1-0.4 mg     omeprazole (priLOSEC) CR capsule 20 mg     polyethylene glycol (MIRALAX) Packet 17 g     senna-docusate (SENOKOT-S/PERICOLACE) 8.6-50 MG per tablet 1 tablet    Or     senna-docusate (SENOKOT-S/PERICOLACE) 8.6-50 MG per tablet 2 tablet     sodium chloride (PF) 0.9% PF flush 3 mL     sodium chloride (PF) 0.9% PF flush 3 mL     vancomycin place cheng - receiving intermittent dosing     vitamin B1 (THIAMINE) tablet 100 mg          Review of Systems:   See above.           Data:     Vitals:    02/16/20 0943 02/16/20 0945 02/16/20 1000 02/16/20 1010   BP: (!) 148/41   (!) 132/35   BP Location:       Pulse: 93   99   Resp:  22 26    Temp: 97.8  F (36.6  C)   97.5  F (36.4  C)   TempSrc: Axillary   Axillary   SpO2:  99% 99%    Weight:       Height:           BMP  Recent Labs   Lab 02/16/20  0400 02/15/20  2149 02/15/20  1658 02/15/20  1034    135 136 136   POTASSIUM 3.4 3.8 3.7 4.1   CHLORIDE 104 101 103 105   CALOS 9.2 10.0 11.3* 8.5   CO2 31 31  26 24   BUN 37* 33* 28 24   CR 1.63* 1.48* 1.34* 1.16   * 224* 241* 116*     CBC  Recent Labs   Lab 02/16/20  0400 02/15/20  1658 02/15/20  1354 02/15/20  0328 02/14/20  1530   WBC 13.1* 12.2*  --  12.2* 13.5*   RBC 4.67 4.90  --  4.72 4.69   HGB 13.4 13.6 13.2* 13.2* 13.2*   HCT 41.8 43.5  --  42.6 40.9   MCV 90 89  --  90 87   MCH 28.7 27.8  --  28.0 28.1   MCHC 32.1 31.3*  --  31.0* 32.3   RDW 18.8* 18.7*  --  18.9* 18.8*   * 126*  --  118* 107*     INR  Recent Labs   Lab 02/16/20  0747 02/15/20  1658 02/13/20  0206   INR 1.33* 1.31* 1.55*     HIT ELISAs collected pre- and post-TPE with each TPE (first 2 days batched and will be tested on Monday, 2/17/20)  HIT Screen negative (2/11/20)  HIT Screen 3.6 units/mL (2/14/20)  DAVINA pending    Attestation: During the TPE the patient was directly seen and evaluated by me, Jamar Wills M.D..    Jamar Wills M.D.  Professor, Transfusion Medicine  Laboratory Medicine & Pathology  Pager: [phone]

## 2020-02-16 NOTE — DISCHARGE INSTRUCTIONS
Going home with your VAD    You are about to leave the hospital with your new VAD. This time can feel overwhelming. Your VAD Coordinator team is here to do everything we can to make this transition as smooth as possible. Below are contact numbers and information to help ease the process. A VAD Coordinator is available 24/7 should you have any questions. We would be more than happy to assist you.     Please remember, if you have a true emergency, ALWAYS call 911 first. Then call the on-call VAD Coordinator.     To Reach the On-Call VAD Coordinator: Dial the main hospital number at [phone]. Choose option 4 to speak with the . Ask him or her to page the on-call VAD Coordinator. We should get back to you within five minutes.     Symptoms to Report: Please contact the on-call VAD Coordinator to report:  - Bleeding   - Dizziness/lightheadedness  - Changes in your VAD parameters (+/- 2 from baseline)  - VAD alarms  - Numbness, tingling, or difficulty moving your arms or legs  - Changes in speech, swallowing, or mental status  - ANY head injury, even if it is just a small bump  - Dark, black, tarry, red, or bloody stools  - If you vomit and it is bloody, black, or looks like coffee grounds  - Increased drainage, redness, tenderness, or trauma to your driveline site, chest incision, or chest tube sites    - Questions about your medications  - Questions about your Coumadin or INR  - Any other changes or concerns    Dressing Supplies: Your dressing supply company is [employer]. Please call them once you leave the hospital. They can be reached at [phone]. Verify that they have the correct address for delivery, especially if you are staying in the [place] before going home.     Blood Thinners: Your Coumadin (warfarin) will be managed by the [facility]. They can be reached M-F, 8am-4pm. They will communicate with you regarding your blood draws and your  Coumadin dose. If you have an INR drawn and you don t hear from the clinic by 2pm please call them at [phone].  Please never allow anyone else to adjust your Coumadin or Aspirin without talking to a VAD Coordinator.     Clinic Appointments: The VAD team will schedule you for all of your follow-up visits. If you have any questions please call the main VAD office at [phone] to speak with our . You can also contact your VAD Coordinator.     VAD Coordinator: Your VAD Coordinator, Gladys Mccarthy, can be reached M-F, 8am-4pm, at [phone] or [email or web address].    If you have any further questions or concerns about your VAD please refer to the discharge folder you received from your VAD Coordinator and/or call the on-call VAD Coordinator.       Home Care after an ICD implant    Wound care:  Keep your incision (surgery wound) dry for 3 days.  After 3 days, you may remove the outer bandage.  This will be done by the nurses at the rehab facility. Keep the strips of tape on.  They will be removed 7-10 days post implant.  This will be done by the nurses at the rehab facility.  Check for signs of infection each day.  These include increased redness, swelling, drainage or a fever over 101 F (38.3 C).  Call us immediately if you see any of these signs.  If there are no signs of infection, you may shower in 3 days.  Do not submerge the incision (in a bath tub, hot tub, or swimming pool) until fully healed.  Pain:   You may have mild to moderate pain for 3 to 5 days.  Take acetaminophen (Tylenol) or ibuprofen (Advil) for the pain.  Call us if the pain is severe or lasts more than 5 days.    Activity:  You should slowly go back to your normal activities after 24 hours.  Healing will take 4 to 6 weeks.  No driving for 3 days  Avoid climbing a ladder alone.  It is best to stay within 4 feet of the ground.  Avoid anything that may cause rough contact or a hard hit to your chest.  This includes football, hockey,  and other contact sports.  Do not go swimming or boating alone.      For at least 4 weeks:  Do not raise your affected arm above your shoulder.  Do not use your affected arm to push, pull, or lift anything over 10 pounds.  Avoid repetitive upper body activities for 6 weeks (ie: golf, swimming, and weight lifting)    Follow Up Visits:  Please plug your home monitor in by your bedside once you are at the rehab facility.  You can push the button to initiate a remote to connect the monitor.  A remote transmission will be sent automatically from your device in 7 to 10 days to have your device checked.  We will also do a remote transmission in 30 days.  Telling others about your device:  Before you have any medical tests or treatments, tell the doctors, dentists, and other care providers about your device.  There are a few tests and treatments that may interfere with your device.  (These include MRI, radiation therapy, electrocautery, and others.)  Your care team may need to take special steps to keep you safe.  Before you leave the hospital, you will receive a temporary ID card.  A permanent card will be mailed to you about 6 to 8 weeks later.  Always carry the ID card with you.  It has important details about your device.  You should also get a [email or web address] ID.  Please ask us for a [email or web address] brochure, or go to [email or web address].    Safety near electrical equipment:  All of these are safe to use when in good repair:    Microwaves    Radios    Cordless phone    Remote controls    Small electrical tools  Cell phones: Keep cell phones at least 6 inches from your device.  Do not carry the phone in a pocket near your device.  Security quintanilla: It is okay to walk through security quintanilla at the airports and department stores.  Tell airport security that you have a defibrillator.  They should keep the screening wand at least 6 inches from your device.  Full-body scanners are safe.    Avoid the following:    ***MRI tests in the  hospital unless you have a MRI safe defibrillator.    Arc welding, chain saws and high-powered industrial or commercial tools.    Power lines, power plants and large power generators.    Electric body fat scales.    Magnetic mattress pads or pillow.    What to do after a shock from your ICD:  1. Stop what you re doing and rest.  2. If you feel fine before and after the shock, call the device clinic.  3. If you feel unwell or receive more than one shock, call 911 or go to the emergency room.  A shock could mean that your condition has changed and you may need to see a doctor.    Questions?  Please call [facility]    Device Nurse:          Business Hours:  [phone]    After Hours:  [phone]   Choose option 4, then ask for the on-call device nurse at job code [id number].    Your next device clinic appointment will be done remotely on:  March 23, 2020.  This will happen automatically from your remote monitor  April 13, 2020.  This will happen automatically  From your remote monitor                                        [facility] [address]  [address] [place] [address]  [id number]

## 2020-02-16 NOTE — PROGRESS NOTES
Cardiology Progress Note  Eliseo Tanner MRN: [id number]  Age: 64 year old, : 1955  Date: 2020              Assessment and Plan:     Mr Eliseo Tanner is a 65yo M w/PMHx notable for chronic systolic heart failure, NICM, moderate CAD, HTN, ABHINAV on CPAP, DM2, CKDIII who is transferred to [facility] for evaluation and management of advanced heart failure with arrhythmia.     Today's plan  -Continue PLEX   -Hold off on CRRT today   -Daily surveillance blood cultures   -Keep dobutamine at 10, dopamine at 5    Moderate CAD  Severe Mitral regurgitation  Chronic nonischemic systolic heart failure w/ reduced EF (15-20%) and exacerbation  NYHA Class III. Echo 2020: LVEF 15-20%; LVEDd 5.9 cm; 4+ MR. C 2019 w/ nonobstructive CAD w/ severe branch disease. Presented with increased wt gain, SOB, LE edema concerning for HF exacerbation, initially concerning for cardiogenic shock. Admitted to [facility] for further evaluation regarding need for advanced HF therapies. Overall, impression is that once infections are clear and renal function improved, can consider listing for OHT or LVAD placement.  -Financial approval obtained for OHT/LVAD w/u; order set has been placed, awaiting Hematology recommendation regarding duration of PLEX. Will discuss timing of surgery   -[place] placed: hemodynamics w/ Jane CO/CI q6h  -Telemetry  -Lactic acid, VBG ordered; trend  -Beta-blocker: None due to decompensated HF  -ACEi/ARB/ARNI: Holding home Entresto  mg BID  -Aldosterone antagonist: Holding spironolactone until renal assessed   -Volume status: Hypervolemic on exam. Weight on admit 240. Baseline weight 225-230.  -Diuretic regimen: holding today   -Continue dobutamine 10, Dopamine 5  -Afterload reduction: holding in setting of being on dopmaine  -Continue ASA 81, atorvastatin 20 mg    Proteus and Enterococcus bacteremia  Organisms growing from 2/2 blood cultures from .  ID consulted   -daily blood  "cultures until negative  -Zosyn (2/13-2/14  -Tobramycin (2/13-2/14)  -Vancomycin (2/13-  -Meropenem (2/14-    BERNARDA on CKDIII  Cr on admission 1.7, baseline Cr 1.5-2. Goal to improve renal function with diuresis in order to make best candidate for potential LVAD.  -Trend Cr  -Daily fluid balance  -Diuresis as above    Precapillary pulmonary hypertension:  Significant transpulmonary gradient of 14 on right heart cath numbers. May be related to CTEPH vs. WHO I.    #Thrombocytopenia:  Concern for HIT given timing with respect to heparin exposure. HIT positive DAVINA pending   -Heme consulted  -Bivalirudin gtt   -PLEX per transfusion medicine     VFib requiring shock  Shocked x 3 prior to transfer, loaded with amio. No known prior history. Suspect related to underlying HF.   -Keep K>4, Mg>2  -Amio 400 mg PO BID  -Need ICD for secondary prevention     Diabetes Mellitus Type II  Last A1c 7.2% 2/6/20  Home regimen includes: 15U basaglar at bedtime, glipizide 2.5  -Will resume home glargine at 30% reduction, 10U QHS  -Holding home oral regimen while renal function and/or hemodynamic status is either impaired or threatened  -Preprandial blood glucose target <140 mg/dL and random <180mg/dl, or 140-180 mg/dL for critically ill  -SSI insulin, q4h blood sugar checks, hypoglycemia protocol ordered     Chronic:  ABHINAV: Home CPAP  Gout: PTA allopurinol 200 daily  MDD: PTA fluoxetine  GERD: PTA PPI  COPD: albuterol PRN    FEN:  2gm Na   2L water restriction    PPX: Bivalirudin    CODE: FULL CODE     Alina James   Cardiology fellow             Subjective/Interval Events     Increased UO overnight   Awake and comfortable in bed           Objective     BP (!) 152/54   Pulse 87   Temp 97.7  F (36.5  C) (Oral)   Resp 26   Ht 1.702 m (5' 7\")   Wt 95.9 kg (211 lb 6.7 oz)   SpO2 96%   BMI 33.11 kg/m    Temp:  [97.4  F (36.3  C)-98  F (36.7  C)] 97.7  F (36.5  C)  Pulse:  [73-99] 87  Heart Rate:  [] 94  Resp:  [7-50] 26  BP: " (132-163)/(35-54) 152/54  MAP:  [73 mmHg-108 mmHg] 83 mmHg  Arterial Line BP: (112-167)/(28-61) 132/43  SpO2:  [90 %-100 %] 96 %  Wt Readings from Last 2 Encounters:   02/16/20 95.9 kg (211 lb 6.7 oz)     I/O last 3 completed shifts:  In: 2043.74 [P.O.:600; I.V.:1443.74]  Out: 4100 [Urine:4100]      Gen: No acute distress  HEENT: NC/AT, PERRL, EOM intact, MMM, OP without exudates  PULM/THORAX: Clear to auscultation bilaterally, no rales/rhonchi/wheezes  CV: normal rate, regular rhythm, normal S1 and S2, no murmurs or rubs.  ABD: Soft, NTND, bowel sounds present, no masses  EXT: WWP. No LE edema, clubbing or cyanosis.  NEURO: CN II-XII grossly intact. A&Ox3    Lines:  -R IJ PA catheter  -Radial arterial line  -L femoral arterial IABP            Data:     Recent Results (from the past 24 hour(s))   Blood gas venous with oxyhemoglobin (PCU Collect TBD)    Collection Time: 02/15/20  7:35 PM   Result Value Ref Range    Ph Venous 7.40 7.32 - 7.43 pH    PCO2 Venous 43 40 - 50 mm Hg    PO2 Venous 29 25 - 47 mm Hg    Bicarbonate Venous 26 21 - 28 mmol/L    FIO2 21.0     Oxyhemoglobin Venous 49 %    Base Excess Venous 1.3 mmol/L   Glucose by meter    Collection Time: 02/15/20  9:43 PM   Result Value Ref Range    Glucose 202 (H) 70 - 99 mg/dL   Basic metabolic panel    Collection Time: 02/15/20  9:49 PM   Result Value Ref Range    Sodium 135 133 - 144 mmol/L    Potassium 3.8 3.4 - 5.3 mmol/L    Chloride 101 94 - 109 mmol/L    Carbon Dioxide 31 20 - 32 mmol/L    Anion Gap 2 (L) 3 - 14 mmol/L    Glucose 224 (H) 70 - 99 mg/dL    Urea Nitrogen 33 (H) 7 - 30 mg/dL    Creatinine 1.48 (H) 0.66 - 1.25 mg/dL    GFR Estimate 48 (L) >60 mL/min/[1.73_m2]    GFR Estimate If Black 56 (L) >60 mL/min/[1.73_m2]    Calcium 10.0 8.5 - 10.1 mg/dL   Calcium ionized    Collection Time: 02/15/20  9:49 PM   Result Value Ref Range    Calcium Ionized 5.1 4.4 - 5.2 mg/dL   Blood gas venous with oxyhemoglobin (PCU Collect TBD)    Collection Time:  02/16/20 12:01 AM   Result Value Ref Range    Ph Venous 7.40 7.32 - 7.43 pH    PCO2 Venous 43 40 - 50 mm Hg    PO2 Venous 33 25 - 47 mm Hg    Bicarbonate Venous 26 21 - 28 mmol/L    FIO2 3     Oxyhemoglobin Venous 59 %    Base Excess Venous 1.3 mmol/L   Comprehensive metabolic panel    Collection Time: 02/16/20  4:00 AM   Result Value Ref Range    Sodium 139 133 - 144 mmol/L    Potassium 3.4 3.4 - 5.3 mmol/L    Chloride 104 94 - 109 mmol/L    Carbon Dioxide 31 20 - 32 mmol/L    Anion Gap 5 3 - 14 mmol/L    Glucose 148 (H) 70 - 99 mg/dL    Urea Nitrogen 37 (H) 7 - 30 mg/dL    Creatinine 1.63 (H) 0.66 - 1.25 mg/dL    GFR Estimate 43 (L) >60 mL/min/[1.73_m2]    GFR Estimate If Black 50 (L) >60 mL/min/[1.73_m2]    Calcium 9.2 8.5 - 10.1 mg/dL    Bilirubin Total 0.5 0.2 - 1.3 mg/dL    Albumin 2.8 (L) 3.4 - 5.0 g/dL    Protein Total 6.5 (L) 6.8 - 8.8 g/dL    Alkaline Phosphatase 124 40 - 150 U/L    ALT 44 0 - 70 U/L    AST 37 0 - 45 U/L   CBC with platelets    Collection Time: 02/16/20  4:00 AM   Result Value Ref Range    WBC 13.1 (H) 4.0 - 11.0 10e9/L    RBC Count 4.67 4.4 - 5.9 10e12/L    Hemoglobin 13.4 13.3 - 17.7 g/dL    Hematocrit 41.8 40.0 - 53.0 %    MCV 90 78 - 100 fl    MCH 28.7 26.5 - 33.0 pg    MCHC 32.1 31.5 - 36.5 g/dL    RDW 18.8 (H) 10.0 - 15.0 %    Platelet Count 143 (L) 150 - 450 10e9/L   Phosphorus    Collection Time: 02/16/20  4:00 AM   Result Value Ref Range    Phosphorus 4.7 (H) 2.5 - 4.5 mg/dL   Magnesium    Collection Time: 02/16/20  4:00 AM   Result Value Ref Range    Magnesium 2.0 1.6 - 2.3 mg/dL   Vancomycin level    Collection Time: 02/16/20  4:00 AM   Result Value Ref Range    Vancomycin Level 18.5 mg/L   Blood gas venous with oxyhemoglobin (PCU Collect TBD)    Collection Time: 02/16/20  4:00 AM   Result Value Ref Range    Ph Venous 7.39 7.32 - 7.43 pH    PCO2 Venous 56 (H) 40 - 50 mm Hg    PO2 Venous 35 25 - 47 mm Hg    Bicarbonate Venous 34 (H) 21 - 28 mmol/L    FIO2 3     Oxyhemoglobin  Venous 62 %    Base Excess Venous 6.7 mmol/L   Calcium ionized whole blood    Collection Time: 02/16/20  4:00 AM   Result Value Ref Range    Calcium Ionized Whole Blood 5.1 4.4 - 5.2 mg/dL   Partial thromboplastin time    Collection Time: 02/16/20  6:00 AM   Result Value Ref Range    PTT 43 (H) 22 - 37 sec   Plasma prepare order mL    Collection Time: 02/16/20  7:00 AM   Result Value Ref Range    Blood Component Type Plasma     Units Ordered 10     Transfuse mLs ordered 3,100 mL   Blood component    Collection Time: 02/16/20  7:00 AM   Result Value Ref Range    Unit Number [id number]     Blood Component Type Plasma, Thawed     Division Number 00     Status of Unit Released to care unit 02/16/2020 0806     Blood Product Code Z5673H51     Unit Status ISS    Blood component    Collection Time: 02/16/20  7:00 AM   Result Value Ref Range    Unit Number [id number]     Blood Component Type Plasma, Thawed     Division Number 00     Status of Unit Released to care unit 02/16/2020 0806     Blood Product Code S9531Z65     Unit Status ISS    Blood component    Collection Time: 02/16/20  7:00 AM   Result Value Ref Range    Unit Number [id number]     Blood Component Type Plasma, Thawed     Division Number 00     Status of Unit Released to care unit 02/16/2020 0806     Blood Product Code [id number]     Unit Status ISS    Blood component    Collection Time: 02/16/20  7:00 AM   Result Value Ref Range    Unit Number [id number]     Blood Component Type Plasma, Thawed     Division Number 00     Status of Unit Released to care unit 02/16/2020 0806     Blood Product Code I4116U34     Unit Status ISS    Blood component    Collection Time: 02/16/20  7:00 AM   Result Value Ref Range    Unit Number [id number]     Blood Component Type Plasma, Thawed     Division Number 00     Status of Unit Released to care unit 02/16/2020 0806     Blood Product Code R0026R85     Unit Status ISS    Blood component    Collection Time: 02/16/20   7:00 AM   Result Value Ref Range    Unit Number [id number]     Blood Component Type Plasma, Thawed     Division Number 00     Status of Unit Released to care unit 02/16/2020 0806     Blood Product Code S4706P41     Unit Status ISS    Blood component    Collection Time: 02/16/20  7:00 AM   Result Value Ref Range    Unit Number [id number]     Blood Component Type Plasma, Thawed     Division Number 00     Status of Unit Released to care unit 02/16/2020 0806     Blood Product Code P2781E46     Unit Status ISS    Blood component    Collection Time: 02/16/20  7:00 AM   Result Value Ref Range    Unit Number [id number]     Blood Component Type Plasma, Thawed     Division Number 00     Status of Unit Released to care unit 02/16/2020 0806     Blood Product Code J3092V97     Unit Status ISS    Blood component    Collection Time: 02/16/20  7:00 AM   Result Value Ref Range    Unit Number [id number]     Blood Component Type Plasma, Thawed     Division Number 00     Status of Unit Released to care unit 02/16/2020 0806     Blood Product Code S5798K65     Unit Status ISS    Blood component    Collection Time: 02/16/20  7:00 AM   Result Value Ref Range    Unit Number [id number]     Blood Component Type Plasma, Thawed     Division Number 00     Status of Unit Released to care unit 02/16/2020 0806     Blood Product Code F5741P48     Unit Status ISS    Glucose by meter    Collection Time: 02/16/20  7:40 AM   Result Value Ref Range    Glucose 122 (H) 70 - 99 mg/dL   Blood gas venous with oxyhemoglobin (PCU Collect TBD)    Collection Time: 02/16/20  7:41 AM   Result Value Ref Range    Ph Venous 7.41 7.32 - 7.43 pH    PCO2 Venous 51 (H) 40 - 50 mm Hg    PO2 Venous 36 25 - 47 mm Hg    Bicarbonate Venous 33 (H) 21 - 28 mmol/L    FIO2 21     Oxyhemoglobin Venous 63 %    Base Excess Venous 6.5 mmol/L   Lactate Dehydrogenase    Collection Time: 02/16/20  7:47 AM   Result Value Ref Range    Lactate Dehydrogenase 337 (H) 85 - 227  U/L   INR    Collection Time: 02/16/20  7:47 AM   Result Value Ref Range    INR 1.33 (H) 0.86 - 1.14   Fibrinogen activity (AM Draw)    Collection Time: 02/16/20  7:47 AM   Result Value Ref Range    Fibrinogen 377 200 - 420 mg/dL   Plasma prepare order mLs    Collection Time: 02/16/20  7:58 AM   Result Value Ref Range    Blood Component Type Plasma     Units Ordered 0     Transfuse mLs ordered 3,100 mL   Basic metabolic panel    Collection Time: 02/16/20 10:11 AM   Result Value Ref Range    Sodium 138 133 - 144 mmol/L    Potassium 3.8 3.4 - 5.3 mmol/L    Chloride 101 94 - 109 mmol/L    Carbon Dioxide 29 20 - 32 mmol/L    Anion Gap 9 3 - 14 mmol/L    Glucose 195 (H) 70 - 99 mg/dL    Urea Nitrogen 37 (H) 7 - 30 mg/dL    Creatinine 1.70 (H) 0.66 - 1.25 mg/dL    GFR Estimate 41 (L) >60 mL/min/[1.73_m2]    GFR Estimate If Black 47 (L) >60 mL/min/[1.73_m2]    Calcium 10.4 (H) 8.5 - 10.1 mg/dL   Calcium ionized whole blood    Collection Time: 02/16/20 10:11 AM   Result Value Ref Range    Calcium Ionized Whole Blood 4.4 4.4 - 5.2 mg/dL   Partial thromboplastin time    Collection Time: 02/16/20 12:05 PM   Result Value Ref Range    PTT 50 (H) 22 - 37 sec   Blood gas venous with oxyhemoglobin (PCU Collect TBD)    Collection Time: 02/16/20 12:05 PM   Result Value Ref Range    Ph Venous 7.43 7.32 - 7.43 pH    PCO2 Venous 53 (H) 40 - 50 mm Hg    PO2 Venous 31 25 - 47 mm Hg    Bicarbonate Venous 35 (H) 21 - 28 mmol/L    FIO2 21     Oxyhemoglobin Venous 53 %    Base Excess Venous 8.8 mmol/L   Glucose by meter    Collection Time: 02/16/20 12:08 PM   Result Value Ref Range    Glucose 222 (H) 70 - 99 mg/dL   Blood culture    Collection Time: 02/16/20 12:09 PM   Result Value Ref Range    Specimen Description Blood Left Hand     Culture Micro No growth after 2 hours    Blood culture    Collection Time: 02/16/20 12:14 PM   Result Value Ref Range    Specimen Description Blood Right Hand     Culture Micro No growth after 2 hours    Plasma  prepare order mL    Collection Time: 02/16/20 12:23 PM   Result Value Ref Range    Blood Component Type Plasma     Units Ordered 11     Transfuse mLs ordered 3,100 mL   Blood component    Collection Time: 02/16/20 12:23 PM   Result Value Ref Range    Unit Number [id number]     Blood Component Type Plasma, Thawed     Division Number 00     Status of Unit Ready for patient 02/16/2020 1318     Blood Product Code Y8710N48     Unit Status JOSÉ MIGUEL    Blood component    Collection Time: 02/16/20 12:23 PM   Result Value Ref Range    Unit Number [id number]     Blood Component Type Plasma, Thawed     Division Number 00     Status of Unit Ready for patient 02/16/2020 1318     Blood Product Code X3167R06     Unit Status JOSÉ MIGUEL    Blood component    Collection Time: 02/16/20 12:23 PM   Result Value Ref Range    Unit Number [id number]     Blood Component Type Plasma, Thawed     Division Number 00     Status of Unit Ready for patient 02/16/2020 1318     Blood Product Code M0036J10     Unit Status JOSÉ MIGUEL    Blood component    Collection Time: 02/16/20 12:23 PM   Result Value Ref Range    Unit Number [id number]     Blood Component Type Plasma, Thawed     Division Number 00     Status of Unit Ready for patient 02/16/2020 1318     Blood Product Code W8654X69     Unit Status JOSÉ MIGUEL    Blood component    Collection Time: 02/16/20 12:23 PM   Result Value Ref Range    Unit Number [id number]     Blood Component Type Apheresis Plasma Thawed     Division Number 00     Status of Unit Ready for patient 02/16/2020 1318     Blood Product Code B1057R49     Unit Status JOSÉ MIGUEL    Blood component    Collection Time: 02/16/20 12:23 PM   Result Value Ref Range    Unit Number [id number]     Blood Component Type Apheresis Plasma Thawed     Division Number 00     Status of Unit Ready for patient 02/16/2020 1318     Blood Product Code B6640F74     Unit Status JOSÉ MIGUEL    Blood component    Collection Time: 02/16/20 12:23 PM   Result Value Ref Range    Unit  Number [id number]     Blood Component Type Plasma, Thawed     Division Number 00     Status of Unit Ready for patient 02/16/2020 1318     Blood Product Code G1371B83     Unit Status JOSÉ MIGUEL    Blood component    Collection Time: 02/16/20 12:23 PM   Result Value Ref Range    Unit Number [id number]     Blood Component Type Plasma, Thawed     Division Number 00     Status of Unit Ready for patient 02/16/2020 1318     Blood Product Code C2418H37     Unit Status JOSÉ MIGUEL    Blood component    Collection Time: 02/16/20 12:23 PM   Result Value Ref Range    Unit Number [id number]     Blood Component Type Plasma, Thawed     Division Number 00     Status of Unit Ready for patient 02/16/2020 1318     Blood Product Code C7290P53     Unit Status JOSÉ MIGUEL    Blood component    Collection Time: 02/16/20 12:23 PM   Result Value Ref Range    Unit Number [id number]     Blood Component Type Apheresis Plasma Thawed     Division Number 00     Status of Unit Ready for patient 02/16/2020 1318     Blood Product Code X3317N25     Unit Status JOSÉ MIGUEL    Blood component    Collection Time: 02/16/20 12:23 PM   Result Value Ref Range    Unit Number [id number]     Blood Component Type Plasma, Thawed     Division Number 00     Status of Unit Ready for patient 02/16/2020 1318     Blood Product Code K4811S42     Unit Status JOSÉ MIGUEL    Blood gas venous with oxyhemoglobin (PCU Collect TBD)    Collection Time: 02/16/20  3:51 PM   Result Value Ref Range    Ph Venous 7.44 (H) 7.32 - 7.43 pH    PCO2 Venous 54 (H) 40 - 50 mm Hg    PO2 Venous 34 25 - 47 mm Hg    Bicarbonate Venous 37 (H) 21 - 28 mmol/L    FIO2 21     Oxyhemoglobin Venous 60 %    Base Excess Venous 10.8 mmol/L   Basic metabolic panel    Collection Time: 02/16/20  3:52 PM   Result Value Ref Range    Sodium 137 133 - 144 mmol/L    Potassium 3.8 3.4 - 5.3 mmol/L    Chloride 100 94 - 109 mmol/L    Carbon Dioxide 34 (H) 20 - 32 mmol/L    Anion Gap 3 3 - 14 mmol/L    Glucose 195 (H) 70 - 99 mg/dL     Urea Nitrogen 37 (H) 7 - 30 mg/dL    Creatinine 1.61 (H) 0.66 - 1.25 mg/dL    GFR Estimate 44 (L) >60 mL/min/[1.73_m2]    GFR Estimate If Black 51 (L) >60 mL/min/[1.73_m2]    Calcium 10.1 8.5 - 10.1 mg/dL       Recent Results (from the past 24 hour(s))   Echocardiogram Complete    Narrative    [id number]  [id number]  [id number]  [id number]^MATT^NAHUN^JIM           [facility],[place]  Echocardiography Laboratory  [address] [place] [id number]     Name: WOLFGANG LEACH  MRN: [id number]  : 1955  Study Date: 2020 10:06 AM  Age: 64 yrs  Gender: Male  Patient Location: [facility]  Reason For Study: Heart Failure  Ordering Physician: NAHUN GUTIERREZ  Referring Physician: SELF, REFERRED  Performed By: Yvonne Adan RDCS     BSA: 2.2 m2  Height: 67 in  Weight: 244 lb  HR: 103  BP: 72/52 mmHg  _____________________________________________________________________________  __        Procedure  Complete Portable Echo Adult. Contrast Optison. Optison (NDC #4245-5792-17)  given intravenously. Patient was given 6 ml mixture of 3 ml Optison and 6 ml  saline. 3 ml wasted.  _____________________________________________________________________________  __        Interpretation Summary  Left ventricular size is normal.  LVEF 20% based on biplane 2D tracing.  Mild to moderate right ventricular dilation is present.  Global right ventricular function is moderately reduced.  Pulmonary artery systolic pressure is normal.  Dilation of the inferior vena cava is present with abnormal respiratory  variation in diameter.  _____________________________________________________________________________  __        Left Ventricle  Left ventricular size is normal. LVEF 20% based on biplane 2D tracing. Left  ventricular wall thickness is normal. Diastolic function not assessed due to  frequent ectopy. Abnormal septal motion consistent with left bundle branch  block is present.     Right Ventricle  Mild to moderate  right ventricular dilation is present. Global right  ventricular function is moderately reduced.     Atria  Mild biatrial enlargement is present.     Mitral Valve  Moderate mitral insufficiency is present.        Aortic Valve  Aortic valve is normal in structure and function.     Tricuspid Valve  Moderate tricuspid insufficiency is present. The right ventricular systolic  pressure is approximated at 24.4 mmHg plus the right atrial pressure.  Pulmonary artery systolic pressure is normal.     Pulmonic Valve  The pulmonic valve cannot be assessed. Mild pulmonic insufficiency is present.     Vessels  The aorta root is normal. The pulmonary artery cannot be assessed. Dilation of  the inferior vena cava is present with abnormal respiratory variation in  diameter.     Compared to Previous Study  Previous study not available for comparison.     _____________________________________________________________________________  __  MMode/2D Measurements & Calculations  IVSd: 0.61 cm  LVIDd: 4.7 cm  LVIDs: 3.7 cm  LVPWd: 0.75 cm  FS: 21.7 %  LV mass(C)d: 99.1 grams  LV mass(C)dI: 45.0 grams/m2     Ao root diam: 2.9 cm  asc Aorta Diam: 2.5 cm  LVOT diam: 1.9 cm  LVOT area: 2.8 cm2     EF(MOD-bp): 21.5 %  LA Volume (BP): 84.8 ml  LA Volume Index (BP): 38.5 ml/m2  RWT: 0.32        Doppler Measurements & Calculations  PA acc time: 0.09 sec     PI end-d saumya: 154.0 cm/sec  TR max saumya: 247.0 cm/sec  TR max P.4 mmHg     _____________________________________________________________________________  __           Report approved by: Graciela Tomlinson 2020 12:05 PM      XR Chest Port 1 View    Narrative    XR CHEST PORT 1 VW  2020 4:21 PM      HISTORY: SG Placement    COMPARISON: None available    FINDINGS: Single AP chest radiograph. [place]-Chucky catheter tip is in the  proximal right main pulmonary artery. Right central line tip and right  upper extremity PICC line tip at the lower SVC. Trachea is midline,  cardiomegaly.  Bilateral perihilar streaky opacities. Mild increased  interstitial opacities in the right lung with ill-defined pulmonary  vascularity. No pneumothorax or pleural effusion. No acute osseous or  abdominal abnormality. Elevated left hemidiaphragm.      Impression    IMPRESSION:  1. [place]-Chucky catheter tip at the proximal right main pulmonary artery.  2. Cardiomegaly with mild pulmonary edema, especially on the right.    I have personally reviewed the examination and initial interpretation  and I agree with the findings.    DONG SOLORIO MD   Cardiac Catheterization    Narrative      Successful insertion of a IABP in the RFA                Medications     Current Facility-Administered Medications   Medication     acetaminophen (TYLENOL) tablet 650 mg     albuterol (PROAIR HFA/PROVENTIL HFA/VENTOLIN HFA) 108 (90 Base) MCG/ACT inhaler 2 puff     allopurinol (ZYLOPRIM) tablet 200 mg     amiodarone (PACERONE) tablet 400 mg     aspirin (ASA) chewable tablet 81 mg     atorvastatin (LIPITOR) tablet 20 mg     bisacodyl (DULCOLAX) EC tablet 5 mg    Or     bisacodyl (DULCOLAX) EC tablet 10 mg    Or     bisacodyl (DULCOLAX) EC tablet 15 mg     bivalirudin (ANGIOMAX) 250 mg in sodium chloride 0.9 % 250 mL ANTICOAGULANT infusion     calcium carbonate (TUMS) chewable tablet 500 mg     cefTRIAXone (ROCEPHIN) 2 g vial to attach to  ml bag for ADULTS or NS 50 ml bag for PEDS     co-enzyme Q-10 capsule 200 mg     glucose gel 15-30 g    Or     dextrose 50 % injection 25-50 mL    Or     glucagon injection 1 mg     diclofenac (VOLTAREN) 1 % topical gel 4 g     DOBUTamine (DOBUTREX) 1,000 mg in D5W 250 mL infusion (adult max conc)     DOPamine 800 mg in dextrose 5% 250 mL (adult max conc) - premix     fentaNYL (PF) (SUBLIMAZE) injection 25 mcg     FLUoxetine (PROzac) capsule 20 mg     insulin aspart (NovoLOG) inj (RAPID ACTING)     insulin aspart (NovoLOG) inj (RAPID ACTING)     insulin glargine (LANTUS PEN) injection 10 Units      lidocaine (LMX4) cream     lidocaine 1 % 0.1-1 mL     magnesium oxide (MAG-OX) tablet 400 mg     medication instruction     melatonin tablet 3 mg     naloxone (NARCAN) injection 0.1-0.4 mg     omeprazole (priLOSEC) CR capsule 20 mg     polyethylene glycol (MIRALAX) Packet 17 g     senna-docusate (SENOKOT-S/PERICOLACE) 8.6-50 MG per tablet 1 tablet    Or     senna-docusate (SENOKOT-S/PERICOLACE) 8.6-50 MG per tablet 2 tablet     sodium chloride (PF) 0.9% PF flush 10 mL     sodium chloride (PF) 0.9% PF flush 10 mL     sodium chloride (PF) 0.9% PF flush 3 mL     sodium chloride (PF) 0.9% PF flush 3 mL     vancomycin (VANCOCIN) 2,000 mg in sodium chloride 0.9 % 250 mL intermittent infusion     vitamin B1 (THIAMINE) tablet 100 mg

## 2020-02-16 NOTE — PROGRESS NOTES
HEMATOLOGY -- Progress Note    Chart and labs reviewed.  Discussed with primary team.  Patient not seen today.    Platelets continue to increase and have risen from approximately 90,000 to 140,000 since stopping heparin.  DAVINA result reportedly negative.  Underwent second plasma exchange treatment this morning.    Given the overall clinical picture here, I would still favor HIT as the most likely explanation for thrombocytopenia.  One could argue that the clinical suspicion was high enough, and the HIT antibody test positive enough, that the DAVINA test did not need to be sent.  I do not see another more likely explanation for the changes in platelet count both while on heparin and after stopping heparin.  Thus I would continue to treat as if this is real HIT.      Reilly James MD  Associate Professor of Medicine  Division of Hematology, Oncology, and Transplantation  Director, [facility]

## 2020-02-17 ENCOUNTER — APPOINTMENT (OUTPATIENT)
Dept: GENERAL RADIOLOGY | Facility: CLINIC | Age: 65
DRG: 001 | End: 2020-02-17
Attending: STUDENT IN AN ORGANIZED HEALTH CARE EDUCATION/TRAINING PROGRAM
Payer: MEDICARE

## 2020-02-17 ENCOUNTER — APPOINTMENT (OUTPATIENT)
Dept: LAB | Facility: CLINIC | Age: 65
End: 2020-02-17
Payer: MEDICARE

## 2020-02-17 ENCOUNTER — PREP FOR PROCEDURE (OUTPATIENT)
Dept: CARDIOLOGY | Facility: CLINIC | Age: 65
End: 2020-02-17

## 2020-02-17 DIAGNOSIS — I50.9 HEART FAILURE (H): Primary | ICD-10-CM

## 2020-02-17 LAB
ANION GAP SERPL CALCULATED.3IONS-SCNC: 4 MMOL/L (ref 3–14)
ANION GAP SERPL CALCULATED.3IONS-SCNC: 6 MMOL/L (ref 3–14)
APTT PPP: 47 SEC (ref 22–37)
BASE DEFICIT BLDV-SCNC: 4.5 MMOL/L
BASE EXCESS BLDV CALC-SCNC: 10.4 MMOL/L
BASE EXCESS BLDV CALC-SCNC: 10.5 MMOL/L
BASE EXCESS BLDV CALC-SCNC: 10.8 MMOL/L
BASE EXCESS BLDV CALC-SCNC: 10.9 MMOL/L
BASE EXCESS BLDV CALC-SCNC: 9.3 MMOL/L
BLD PROD TYP BPU: NORMAL
BLD UNIT ID BPU: 0
BLOOD PRODUCT CODE: NORMAL
BPU ID: NORMAL
BUN SERPL-MCNC: 35 MG/DL (ref 7–30)
BUN SERPL-MCNC: 37 MG/DL (ref 7–30)
BUN SERPL-MCNC: 38 MG/DL (ref 7–30)
CA-I BLD-MCNC: 4.5 MG/DL (ref 4.4–5.2)
CALCIUM SERPL-MCNC: 9.3 MG/DL (ref 8.5–10.1)
CALCIUM SERPL-MCNC: 9.4 MG/DL (ref 8.5–10.1)
CHLORIDE SERPL-SCNC: 100 MMOL/L (ref 94–109)
CHLORIDE SERPL-SCNC: 102 MMOL/L (ref 94–109)
CO2 SERPL-SCNC: 35 MMOL/L (ref 20–32)
CO2 SERPL-SCNC: 35 MMOL/L (ref 20–32)
CREAT SERPL-MCNC: 1.45 MG/DL (ref 0.66–1.25)
CREAT SERPL-MCNC: 1.56 MG/DL (ref 0.66–1.25)
CREAT SERPL-MCNC: 1.62 MG/DL (ref 0.66–1.25)
ERYTHROCYTE [DISTWIDTH] IN BLOOD BY AUTOMATED COUNT: 18.2 % (ref 10–15)
GFR SERPL CREATININE-BSD FRML MDRD: 43 ML/MIN/{1.73_M2}
GFR SERPL CREATININE-BSD FRML MDRD: 45 ML/MIN/{1.73_M2}
GFR SERPL CREATININE-BSD FRML MDRD: 50 ML/MIN/{1.73_M2}
GLUCOSE BLDC GLUCOMTR-MCNC: 137 MG/DL (ref 70–99)
GLUCOSE BLDC GLUCOMTR-MCNC: 180 MG/DL (ref 70–99)
GLUCOSE SERPL-MCNC: 142 MG/DL (ref 70–99)
GLUCOSE SERPL-MCNC: 153 MG/DL (ref 70–99)
HCO3 BLDV-SCNC: 19 MMOL/L (ref 21–28)
HCO3 BLDV-SCNC: 35 MMOL/L (ref 21–28)
HCO3 BLDV-SCNC: 36 MMOL/L (ref 21–28)
HCO3 BLDV-SCNC: 37 MMOL/L (ref 21–28)
HCT VFR BLD AUTO: 41.5 % (ref 40–53)
HEP-DEP PLT ABY SRA: NORMAL
HGB BLD-MCNC: 13.5 G/DL (ref 13.3–17.7)
LAB SCANNED RESULT: NORMAL
MCH RBC QN AUTO: 28.7 PG (ref 26.5–33)
MCHC RBC AUTO-ENTMCNC: 32.5 G/DL (ref 31.5–36.5)
MCV RBC AUTO: 88 FL (ref 78–100)
O2/TOTAL GAS SETTING VFR VENT: 21 %
O2/TOTAL GAS SETTING VFR VENT: ABNORMAL %
O2/TOTAL GAS SETTING VFR VENT: ABNORMAL %
OXYHGB MFR BLDV: 58 %
OXYHGB MFR BLDV: 66 %
OXYHGB MFR BLDV: 66 %
OXYHGB MFR BLDV: 68 %
OXYHGB MFR BLDV: 69 %
OXYHGB MFR BLDV: 72 %
PCO2 BLDV: 28 MM HG (ref 40–50)
PCO2 BLDV: 50 MM HG (ref 40–50)
PCO2 BLDV: 50 MM HG (ref 40–50)
PCO2 BLDV: 51 MM HG (ref 40–50)
PCO2 BLDV: 51 MM HG (ref 40–50)
PCO2 BLDV: 54 MM HG (ref 40–50)
PF4 HEPARIN CMPLX AB SER QL: ABNORMAL
PF4 HEPARIN CMPLX AB SER QL: NEGATIVE
PF4 HEPARIN CMPLX AB SER QL: NEGATIVE
PF4 HEPARIN CMPLX AB SER QL: POSITIVE
PH BLDV: 7.44 PH (ref 7.32–7.43)
PH BLDV: 7.44 PH (ref 7.32–7.43)
PH BLDV: 7.45 PH (ref 7.32–7.43)
PH BLDV: 7.46 PH (ref 7.32–7.43)
PH BLDV: 7.46 PH (ref 7.32–7.43)
PH BLDV: 7.47 PH (ref 7.32–7.43)
PLATELET # BLD AUTO: 179 10E9/L (ref 150–450)
PO2 BLDV: 32 MM HG (ref 25–47)
PO2 BLDV: 36 MM HG (ref 25–47)
PO2 BLDV: 36 MM HG (ref 25–47)
PO2 BLDV: 38 MM HG (ref 25–47)
PO2 BLDV: 38 MM HG (ref 25–47)
PO2 BLDV: 39 MM HG (ref 25–47)
POTASSIUM SERPL-SCNC: 3.3 MMOL/L (ref 3.4–5.3)
POTASSIUM SERPL-SCNC: 3.7 MMOL/L (ref 3.4–5.3)
RBC # BLD AUTO: 4.71 10E12/L (ref 4.4–5.9)
SODIUM SERPL-SCNC: 138 MMOL/L (ref 133–144)
SODIUM SERPL-SCNC: 142 MMOL/L (ref 133–144)
TRANSFUSION STATUS PATIENT QL: NORMAL
WBC # BLD AUTO: 12.6 10E9/L (ref 4–11)

## 2020-02-17 PROCEDURE — 82805 BLOOD GASES W/O2 SATURATION: CPT | Performed by: STUDENT IN AN ORGANIZED HEALTH CARE EDUCATION/TRAINING PROGRAM

## 2020-02-17 PROCEDURE — 25000132 ZZH RX MED GY IP 250 OP 250 PS 637: Mod: GY

## 2020-02-17 PROCEDURE — P9059 PLASMA, FRZ BETWEEN 8-24HOUR: HCPCS | Performed by: PATHOLOGY

## 2020-02-17 PROCEDURE — 40000986 XR CHEST PORT 1 VW

## 2020-02-17 PROCEDURE — 86923 COMPATIBILITY TEST ELECTRIC: CPT | Performed by: PHYSICIAN ASSISTANT

## 2020-02-17 PROCEDURE — 99291 CRITICAL CARE FIRST HOUR: CPT | Mod: GC | Performed by: INTERNAL MEDICINE

## 2020-02-17 PROCEDURE — 71045 X-RAY EXAM CHEST 1 VIEW: CPT

## 2020-02-17 PROCEDURE — 25000132 ZZH RX MED GY IP 250 OP 250 PS 637: Mod: GY | Performed by: INTERNAL MEDICINE

## 2020-02-17 PROCEDURE — 80048 BASIC METABOLIC PNL TOTAL CA: CPT | Performed by: INTERNAL MEDICINE

## 2020-02-17 PROCEDURE — 84520 ASSAY OF UREA NITROGEN: CPT | Performed by: STUDENT IN AN ORGANIZED HEALTH CARE EDUCATION/TRAINING PROGRAM

## 2020-02-17 PROCEDURE — 40000048 ZZH STATISTIC DAILY SWAN MONITORING

## 2020-02-17 PROCEDURE — 40000344 ZZHCL STATISTIC THAWING COMPONENT: Performed by: PATHOLOGY

## 2020-02-17 PROCEDURE — 20000004 ZZH R&B ICU UMMC

## 2020-02-17 PROCEDURE — 86850 RBC ANTIBODY SCREEN: CPT | Performed by: PHYSICIAN ASSISTANT

## 2020-02-17 PROCEDURE — 40000196 ZZH STATISTIC RAPCV CVP MONITORING

## 2020-02-17 PROCEDURE — 25000128 H RX IP 250 OP 636: Performed by: STUDENT IN AN ORGANIZED HEALTH CARE EDUCATION/TRAINING PROGRAM

## 2020-02-17 PROCEDURE — 00000146 ZZHCL STATISTIC GLUCOSE BY METER IP

## 2020-02-17 PROCEDURE — 86900 BLOOD TYPING SEROLOGIC ABO: CPT | Performed by: PHYSICIAN ASSISTANT

## 2020-02-17 PROCEDURE — 85027 COMPLETE CBC AUTOMATED: CPT | Performed by: INTERNAL MEDICINE

## 2020-02-17 PROCEDURE — 40000076 ZZH STATISTIC IABP MONITORING

## 2020-02-17 PROCEDURE — 36514 APHERESIS PLASMA: CPT

## 2020-02-17 PROCEDURE — 85730 THROMBOPLASTIN TIME PARTIAL: CPT | Performed by: INTERNAL MEDICINE

## 2020-02-17 PROCEDURE — 86901 BLOOD TYPING SEROLOGIC RH(D): CPT | Performed by: PHYSICIAN ASSISTANT

## 2020-02-17 PROCEDURE — 40000014 ZZH STATISTIC ARTERIAL MONITORING DAILY

## 2020-02-17 PROCEDURE — 25800030 ZZH RX IP 258 OP 636: Performed by: STUDENT IN AN ORGANIZED HEALTH CARE EDUCATION/TRAINING PROGRAM

## 2020-02-17 PROCEDURE — 25000132 ZZH RX MED GY IP 250 OP 250 PS 637: Mod: GY | Performed by: STUDENT IN AN ORGANIZED HEALTH CARE EDUCATION/TRAINING PROGRAM

## 2020-02-17 PROCEDURE — 82330 ASSAY OF CALCIUM: CPT | Performed by: STUDENT IN AN ORGANIZED HEALTH CARE EDUCATION/TRAINING PROGRAM

## 2020-02-17 PROCEDURE — 25000128 H RX IP 250 OP 636: Performed by: PATHOLOGY

## 2020-02-17 PROCEDURE — 25000125 ZZHC RX 250: Performed by: PHYSICIAN ASSISTANT

## 2020-02-17 PROCEDURE — 40000275 ZZH STATISTIC RCP TIME EA 10 MIN

## 2020-02-17 PROCEDURE — 86022 PLATELET ANTIBODIES: CPT | Performed by: PATHOLOGY

## 2020-02-17 PROCEDURE — 82565 ASSAY OF CREATININE: CPT | Performed by: STUDENT IN AN ORGANIZED HEALTH CARE EDUCATION/TRAINING PROGRAM

## 2020-02-17 PROCEDURE — 25000125 ZZHC RX 250: Performed by: PATHOLOGY

## 2020-02-17 RX ORDER — AMPICILLIN 2 G/1
2 INJECTION, POWDER, FOR SOLUTION INTRAVENOUS EVERY 6 HOURS
Status: DISCONTINUED | OUTPATIENT
Start: 2020-02-17 | End: 2020-03-12

## 2020-02-17 RX ORDER — POTASSIUM CHLORIDE 750 MG/1
40 TABLET, EXTENDED RELEASE ORAL ONCE
Status: COMPLETED | OUTPATIENT
Start: 2020-02-17 | End: 2020-02-17

## 2020-02-17 RX ORDER — CEFTRIAXONE 2 G/1
2 INJECTION, POWDER, FOR SOLUTION INTRAMUSCULAR; INTRAVENOUS EVERY 12 HOURS
Status: DISPENSED | OUTPATIENT
Start: 2020-02-17 | End: 2020-02-29

## 2020-02-17 RX ORDER — CALCIUM GLUCONATE 100 MG/ML
AMPUL (ML) INTRAVENOUS
Status: COMPLETED | OUTPATIENT
Start: 2020-02-17 | End: 2020-02-17

## 2020-02-17 RX ORDER — MUPIROCIN 20 MG/G
OINTMENT TOPICAL 2 TIMES DAILY
Status: COMPLETED | OUTPATIENT
Start: 2020-02-17 | End: 2020-02-18

## 2020-02-17 RX ORDER — DIPHENHYDRAMINE HYDROCHLORIDE 50 MG/ML
50 INJECTION INTRAMUSCULAR; INTRAVENOUS
Status: DISCONTINUED | OUTPATIENT
Start: 2020-02-17 | End: 2020-02-17

## 2020-02-17 RX ORDER — BUMETANIDE 0.25 MG/ML
2 INJECTION INTRAMUSCULAR; INTRAVENOUS ONCE
Status: COMPLETED | OUTPATIENT
Start: 2020-02-17 | End: 2020-02-18

## 2020-02-17 RX ADMIN — CEFTRIAXONE 2 G: 2 INJECTION, POWDER, FOR SOLUTION INTRAMUSCULAR; INTRAVENOUS at 11:15

## 2020-02-17 RX ADMIN — ANTICOAGULANT CITRATE DEXTROSE SOLUTION FORMULA A 3 ML: 12.25; 11; 3.65 SOLUTION INTRAVENOUS at 10:49

## 2020-02-17 RX ADMIN — SENNOSIDES AND DOCUSATE SODIUM 1 TABLET: 8.6; 5 TABLET ORAL at 19:47

## 2020-02-17 RX ADMIN — CEFTRIAXONE 2 G: 2 INJECTION, POWDER, FOR SOLUTION INTRAMUSCULAR; INTRAVENOUS at 21:32

## 2020-02-17 RX ADMIN — AMPICILLIN SODIUM 2 G: 2 INJECTION, POWDER, FOR SOLUTION INTRAMUSCULAR; INTRAVENOUS at 22:55

## 2020-02-17 RX ADMIN — DICLOFENAC 4 G: 10 GEL TOPICAL at 19:54

## 2020-02-17 RX ADMIN — AMPICILLIN SODIUM 2 G: 2 INJECTION, POWDER, FOR SOLUTION INTRAMUSCULAR; INTRAVENOUS at 11:23

## 2020-02-17 RX ADMIN — AMPICILLIN SODIUM 2 G: 2 INJECTION, POWDER, FOR SOLUTION INTRAMUSCULAR; INTRAVENOUS at 15:55

## 2020-02-17 RX ADMIN — AMIODARONE HYDROCHLORIDE 400 MG: 200 TABLET ORAL at 08:31

## 2020-02-17 RX ADMIN — FLUOXETINE 20 MG: 20 CAPSULE ORAL at 08:31

## 2020-02-17 RX ADMIN — MAGNESIUM OXIDE 400 MG: 400 TABLET ORAL at 08:32

## 2020-02-17 RX ADMIN — INSULIN GLARGINE 10 UNITS: 100 INJECTION, SOLUTION SUBCUTANEOUS at 21:48

## 2020-02-17 RX ADMIN — ALLOPURINOL 200 MG: 100 TABLET ORAL at 08:32

## 2020-02-17 RX ADMIN — CALCIUM GLUCONATE 4.4 G: 98 INJECTION, SOLUTION INTRAVENOUS at 08:50

## 2020-02-17 RX ADMIN — THIAMINE HCL TAB 100 MG 100 MG: 100 TAB at 08:32

## 2020-02-17 RX ADMIN — Medication 200 MG: at 08:29

## 2020-02-17 RX ADMIN — OMEPRAZOLE 20 MG: 20 CAPSULE, DELAYED RELEASE ORAL at 08:31

## 2020-02-17 RX ADMIN — ASPIRIN 81 MG CHEWABLE TABLET 81 MG: 81 TABLET CHEWABLE at 08:30

## 2020-02-17 RX ADMIN — DOPAMINE HYDROCHLORIDE 5 MCG/KG/MIN: 320 INJECTION, SOLUTION INTRAVENOUS at 06:36

## 2020-02-17 RX ADMIN — MUPIROCIN: 20 OINTMENT TOPICAL at 19:51

## 2020-02-17 RX ADMIN — SENNOSIDES AND DOCUSATE SODIUM 2 TABLET: 8.6; 5 TABLET ORAL at 08:30

## 2020-02-17 RX ADMIN — POTASSIUM CHLORIDE 40 MEQ: 750 TABLET, EXTENDED RELEASE ORAL at 06:41

## 2020-02-17 RX ADMIN — ATORVASTATIN CALCIUM 20 MG: 20 TABLET, FILM COATED ORAL at 19:47

## 2020-02-17 RX ADMIN — DOBUTAMINE 7.5 MCG/KG/MIN: 12.5 INJECTION, SOLUTION INTRAVENOUS at 16:38

## 2020-02-17 RX ADMIN — ANTICOAGULANT CITRATE DEXTROSE SOLUTION FORMULA A 739 ML: 12.25; 11; 3.65 SOLUTION INTRAVENOUS at 08:50

## 2020-02-17 ASSESSMENT — ACTIVITIES OF DAILY LIVING (ADL)
ADLS_ACUITY_SCORE: 16

## 2020-02-17 ASSESSMENT — MIFFLIN-ST. JEOR: SCORE: 1708.42

## 2020-02-17 NOTE — PROGRESS NOTES
[facility]  General Infectious Disease Progress Note     Patient:  Eliseo Tanner, Date of birth 1955, Medical record number [id number]  Date of Visit:  February 16, 2020         Assessment and Recommendations:   Problem List:  1. Bacteremia with Proteus mirabilis and Enterococcus faecalis in 2/2 bottles on 2/13. Cultures from 2/14 are negative to date. Urine culture was negative on 2/13 making a urinary source unlikely. Lines were suspected as a source and exchanged. CT C/A/P from 2/8 without any obvious intra-abdominal source.   2. Vfib arrest  3. Chronic systolic heart failure with exacerbation. Plan for LVAD placement mid-week  4. Type 2 diabetes  5. CKD 3  6. 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 Prior to arrival to the floor he was in Vfib and required shocks x 3. He was being diuresed as expected, and has been requiring inotropes and a balloon pump. On 2/13 morning had rigors and chills. Blood cultures (2/2) were positive for Proteus mirabilis and Enterococcus. I suspect this is most likely from his lines. Lines all removed and changed on 2/13 evening and balloon pump was also exchanged. His blood cultures from 2/14 remain negative to date.     He needs an LVAD in the near future with surgery tentatively planned for mid-week. This will allow for >72 hours of negative cultures prior to placement and will provide good possibility that device will not become infected (at least not related to this episode of bacteremia)     Recommendations:  1. Change meropenem to ceftriaxone 2g daily (Proteus mirabilis is an indole negative Proteus species that is low risk for inducible ampC beta lactamase)  2. Continue vancomycin pending E faecalis sensitivities (though suspect it will be  sensitive to ampicillin)  3. Agree with plan for LVAD placement mid-week    ID will follow.      Marlin Blanco MD  Infectious Diseases  [phone] (TextPage)           Interval History:   No further positive blood cultures. Undergoing plasmapheresis for likely HIT and LVAD placement planned for mid-week. Seen while eating dinner. Feeling well, eating well, and in good spirits.          Review of Systems:   4 point ROS including Respiratory, CV, GI and , other than that noted in the HPI,  is negative           Current Antimicrobials   Vancomycin 2/13-present  Ceftriaxone 2/15-present  Meropenem 2/14-2/15  Tobramycin x 1 2/13  Pip/tazo 2/13-2/14         Physical Exam:   Ranges for vital signs:  Temp:  [97.5  F (36.4  C)-98  F (36.7  C)] 97.5  F (36.4  C)  Pulse:  [73-99] 87  Heart Rate:  [] 84  Resp:  [7-50] 18  BP: (132-163)/(35-54) 152/54  MAP:  [73 mmHg-108 mmHg] 85 mmHg  Arterial Line BP: (112-167)/(28-61) 144/37  SpO2:  [90 %-100 %] 97 %    Exam:  GENERAL:  well-developed, well-nourished, in no acute distress.   ENT:  Head is normocephalic, atraumatic. Oropharynx is moist without exudates or ulcers.  EYES:  Eyes have anicteric sclerae. PERRL.   NECK:  Supple. No cervical lymphadenopathy  LUNGS:  Normal respiratory effort.   ABDOMEN:  Non-distended.   EXT: Extremities warm and without edema.  SKIN:  No acute rashes. Multiple lines in place since 2/13. No erythema.   NEUROLOGIC:  Grossly nonfocal.  Guevara catheter in place.          Laboratory Data:     Creatinine   Date Value Ref Range Status   02/16/2020 1.61 (H) 0.66 - 1.25 mg/dL Final   02/16/2020 1.70 (H) 0.66 - 1.25 mg/dL Final   02/16/2020 1.63 (H) 0.66 - 1.25 mg/dL Final   02/15/2020 1.48 (H) 0.66 - 1.25 mg/dL Final   02/15/2020 1.34 (H) 0.66 - 1.25 mg/dL Final     WBC   Date Value Ref Range Status   02/16/2020 13.1 (H) 4.0 - 11.0 10e9/L Final   02/15/2020 12.2 (H) 4.0 - 11.0 10e9/L Final   02/15/2020 12.2 (H) 4.0 - 11.0 10e9/L Final    02/14/2020 13.5 (H) 4.0 - 11.0 10e9/L Final   02/14/2020 15.3 (H) 4.0 - 11.0 10e9/L Final     Hemoglobin   Date Value Ref Range Status   02/16/2020 13.4 13.3 - 17.7 g/dL Final     Platelet Count   Date Value Ref Range Status   02/16/2020 143 (L) 150 - 450 10e9/L Final     CRP Inflammation   Date Value Ref Range Status   02/08/2020 21.0 (H) 0.0 - 8.0 mg/L Final     AST   Date Value Ref Range Status   02/16/2020 37 0 - 45 U/L Final   02/15/2020 43 0 - 45 U/L Final   02/15/2020 82 (H) 0 - 45 U/L Final   02/14/2020 107 (H) 0 - 45 U/L Final   02/13/2020 83 (H) 0 - 45 U/L Final     ALT   Date Value Ref Range Status   02/16/2020 44 0 - 70 U/L Final   02/15/2020 47 0 - 70 U/L Final   02/15/2020 82 (H) 0 - 70 U/L Final   02/14/2020 83 (H) 0 - 70 U/L Final   02/13/2020 58 0 - 70 U/L Final     Bilirubin Total   Date Value Ref Range Status   02/16/2020 0.5 0.2 - 1.3 mg/dL Final   02/15/2020 0.6 0.2 - 1.3 mg/dL Final   02/15/2020 0.8 0.2 - 1.3 mg/dL Final   02/14/2020 1.4 (H) 0.2 - 1.3 mg/dL Final   02/13/2020 1.8 (H) 0.2 - 1.3 mg/dL Final     Lab Results   Component Value Date     02/16/2020    BUN 37 (H) 02/16/2020    CO2 34 (H) 02/16/2020       Culture data:  2/14 Blood culture NGTD  2/13 Blood culture: Enterococcus faecalis, sensis pending; Proteus mirabilis, widely sensitive  2/13 Urine culture negative    All cultures:  Recent Labs   Lab 02/16/20  1214 02/16/20  1209 02/15/20  1422 02/14/20  0919 02/13/20  0353 02/13/20  0350 02/13/20  0331   CULT No growth after 5 hours No growth after 5 hours No growth after 1 day  No growth after 1 day No growth after 2 days Cultured on the 1st day of incubation:  Proteus mirabilis  Susceptibility testing done on previous specimen  *  Critical Value/Significant Value, preliminary result only, called to and read back by  Mima Cabrera RN. @1426. 2.13.20. BS.     Cultured on the 1st day of incubation:  Enterococcus faecalis  Susceptibility testing done on previous specimen  *   Critical Value/Significant Value, preliminary result only, called to and read back by  Alisson Castillo RN on 2.13.20 at 1728.  JRT    (Note)  POSITIVE for ENTEROCOCCUS FAECALIS and NEGATIVE for Ernst/vanB genes  by Verigene multiplex nucleic acid test. Final identification and  antimicrobial susceptibility testing will be verified by standard  methods.    Specimen tested with Verigene multiplex, gram-positive blood culture  nucleic acid test for the following targets: Staph aureus, Staph  epidermidis, Staph lugdunensis, other Staph species, Enterococcus  faecalis, Enterococcus faecium, Streptococcus species, S. agalactiae,  S. anginosus grp., S. pneumoniae, S. pyogenes, Listeria sp., mecA  (methicillin resistance) and Ernst/B (vancomycin resistance).    Critical Value/Significant Value called to and read back by MARIA EUGENIA MILLAN RN 2/13/20 2036 EH       Cultured on the 1st day of incubation:  Proteus mirabilis  *  Critical Value/Significant Value, preliminary result only, called to and read back by  Mima Cabrera RN. @1426. 2.13.20. BS.     Cultured on the 1st day of incubation:  Enterococcus faecalis  *  Critical Value/Significant Value, preliminary result only, called to and read back by  Alisson Leon RN on 2.13.20 at 1728.  JRT    (Note)  POSITIVE for PROTEUS SPECIES by Verigene multiplex nucleic acid test.  Final identification and antimicrobial susceptibility testing will be  verified by standard methods.    Specimen tested with Verigene multiplex, gram-negative blood culture  nucleic acid test for the following targets: Acinetobacter sp.,  Citrobacter sp., Enterobacter sp., Proteus sp., E. coli, K.  pneumoniae/oxytoca, P. aeruginosa, and the following resistance  markers: CTXM, KPC, NDM, VIM, IMP and OXA.    Critical Value/Significant Value called to and read back by  Alisson Leon RN @ 1650. 2/13/20. AV   No growth

## 2020-02-17 NOTE — PROGRESS NOTES
CLINICAL NUTRITION SERVICES - REASSESSMENT NOTE     Nutrition Prescription    Malnutrition Status:    Does not meet criteria for malnutrition     Interventions by Registered Dietitian (RD):  - Gave cafeteria vouchers to promote adequate intake within sodium restriction    - Provided education on diet recommendations post-LVAD     Future Recommendations:  - Offer oral nutrition supplements if intake declines  - If TF indicated, recommend: Impact Peptide @ 50 ml/hr (1200 ml/day) to provide 1800 kcals (24 kcal/kg/day), 113 g PRO (1.5 gm/kg/day), 924 ml free H2O, 77 g Fat (50% from MCTs), 168 g CHO and no Fiber daily.       EVALUATION OF THE PROGRESS TOWARD GOALS   Diet: Low saturated fat, <2400 mg sodium   Intake: Eating 100% of 3 adequate meals/day. Reports feeling tired of hospital menu.        NEW FINDINGS   CV: Plan for LVAD on 2/18.   Meds: CoQ10 200 mg. Thiamine 100 mg for reduced EF.   Weight: New lowest weight of 211 lbs (96 kg) on 2/16. Cannot rule out loses r/t fluctuation in fluid status. Updated dosing weight below.      Updated assessed nutrition needs   Dosing weight: 74 kg (adjusted, based on IBW of 76.3 kg and lowest weight of 96 kg on 2/16)   Energy needs: 1500 - 1850+ kcal/day (20 - 25 kcal/kg/day)   Justification: Maintenance, obese   Protein needs: 80 - 105 g protein/day (1.1 - 1.4 g protein/day)   Justification: Increased needs. If/when post-LVAD increase to 110 - 150 g protein/day (1.5 - 2 g protein/kg/day)     MALNUTRITION  % Intake: No decreased intake noted   % Weight Loss: None noted   Subcutaneous Fat Loss: None observed  Muscle Loss: None observed  Fluid Accumulation/Edema: Mild  Malnutrition Diagnosis: Patient does not meet two of the established criteria necessary for diagnosing malnutrition    Previous Goals   Patient to consume % of nutritionally adequate meal trays TID, or the equivalent with supplements/snacks.  Evaluation: Met    Previous Nutrition Diagnosis  Predicted  inadequate nutrient intake (protein/kcal) related to potential upcoming LVAD and increased needs post-op as evidenced by pt currently eating well and maintaining wt, but potential for appetite to decline post-VAD and post-VAD needs of 117-156 grams protein/day (1.5 - 2 grams of pro/kg).  Evaluation: No change    CURRENT NUTRITION DIAGNOSIS  Predicted inadequate nutrient intake (protein/kcal) related to potential upcoming LVAD and increased needs post-op as evidenced by pt currently eating well and maintaining wt, but potential for appetite to decline post-VAD and post-VAD needs of 117-156 grams protein/day (1.5 - 2 grams of pro/kg).    INTERVENTIONS  Implementation  See interventions in box at top of progress note      Goals  Patient to consume % of nutritionally adequate meal trays TID, or the equivalent with supplements/snacks.    Monitoring/Evaluation  Progress toward goals will be monitored and evaluated per protocol.    Marisela Bhandari RD, LD  [id number]  Pgr: [phone]

## 2020-02-17 NOTE — PLAN OF CARE
No significant events overnight.  Pt slept throughout the night, on home CPAP 3L.  Neuro intact, PERRL, afebrile.  SR 80s, minimal ectopy, replaced 40meq of KCl overnight per MD.  Gtts unchanged, see documentation flowsheet for hemodynamics and VS. Will continue to update MD with any changes in condition per protocol, plans for another plasmaphoresis run.

## 2020-02-17 NOTE — PROCEDURES
Laboratory Medicine and Pathology  Transfusion Medicine - Apheresis Procedure    Eliseo Tanner MRN# [id number]   YOB: 1955 Age: 64 year old   Date of Admission: 2020     Reason for consult: Concern for HIT and imminent surgery (LVAD)           Assessment and Plan:   The patient is a 64 you male with chronic systolic heart failure, NICM, moderate CAD, HTN, ABHINAV on CPAP, DM2, CKDIII who is transferred to [facility] for evaluation and management of advanced heart failure with arrhythmia.  The patient will likely move forward with LVAD placement, and concern for HIT arose with drop in platelet count coinciding with use of heparin.  HIT screen positive on 20.  Serotonin release assay has now resulted and is negative.  Clinically, still a strong concern for HIT.   Team requested a series of therapeutic plasma exchanges (TPE).    Today is TPE #3, he tolerated the procedure well without complication.  The HIT ELAINE testing values are also back today.  OD values for the test showed that it was slightly above the reference range before the first exchange on 2/15, the value was 0.45 (ref range <0.40).  Prior today's exchange it was a negative value, 0.36.  I discussed with the primary team, and plans are in place for him to go to surgery tomorrow.  No additional rounds of TPE are anticipated, but feel free to contact the apheresis service if the clinical situation changes at all.    He notes doing well today.  Denies nausea, vomiting, fevers, chills.        Please do not start or continue ACE inhibitors throughout the duration of a TPE series.  ACE inhibitors can elicit bradykinin-based reactions (hypotension, flushing, etc) in the setting of TPE.  Also, it is noted that the patient is receiving ceftriaxone.  Please do not administer ceftriaxone concurrent with TPE.  Either dose ceftriaxone before or after each TPE.  While the experience is primarily in the , concurrent ceftriaxone and TPE (with  calcium gluconate to offset citrate-induced hypocalcemia) can cause crystal formation and deposition in tissues.               Past Medical History:   No past medical history on file.  -chronic systolic heart failure,   -NICM,   -moderate CAD,   -HTN,   -ABHINAV on CPAP,   -DM2,   -CKDIII          Past Surgical History:     Past Surgical History:   Procedure Laterality Date     CV CENTRAL VENOUS CATHETER PLACEMENT N/A 2/13/2020    Procedure: Central Venous Catheter Placement;  Surgeon: Chente Moss MD;  Location:  HEART CARDIAC CATH LAB     CV INTRA-AORTIC BALLOON PUMP INSERTION N/A 2/7/2020    Procedure: Intra-Aortic Balloon Pump Insertion;  Surgeon: Jose Baldwin MD;  Location:  HEART CARDIAC CATH LAB     CV INTRA-AORTIC BALLOON PUMP INSERTION N/A 2/13/2020    Procedure: Intra-Aortic Balloon Pump Insertion;  Surgeon: Chente Moss MD;  Location:  HEART CARDIAC CATH LAB     CV SWAN LUCIANA PROCEDURE N/A 2/13/2020    Procedure: [place] Luciana Procedure;  Surgeon: Chente Moss MD;  Location:  HEART CARDIAC CATH LAB            Social History:   .          Allergies:        Allergies   Allergen Reactions     Heparin      HIT screen positive 2/14/20, reflex DAVINA in process____  HIT screen negative 2/11/20     Oxycodone Itching and Other (See Comments)             Medications:     Current Facility-Administered Medications   Medication     acetaminophen (TYLENOL) tablet 650 mg     albuterol (PROAIR HFA/PROVENTIL HFA/VENTOLIN HFA) 108 (90 Base) MCG/ACT inhaler 2 puff     allopurinol (ZYLOPRIM) tablet 200 mg     amiodarone (PACERONE) tablet 400 mg     ampicillin (OMNIPEN) 2 g vial to attach to  ml bag     aspirin (ASA) chewable tablet 81 mg     atorvastatin (LIPITOR) tablet 20 mg     bisacodyl (DULCOLAX) EC tablet 5 mg    Or     bisacodyl (DULCOLAX) EC tablet 10 mg    Or     bisacodyl (DULCOLAX) EC tablet 15 mg     bivalirudin (ANGIOMAX) 250 mg in sodium  chloride 0.9 % 250 mL ANTICOAGULANT infusion     calcium carbonate (TUMS) chewable tablet 500 mg     cefTRIAXone (ROCEPHIN) 2 g vial to attach to  ml bag for ADULTS or NS 50 ml bag for PEDS     co-enzyme Q-10 capsule 200 mg     glucose gel 15-30 g    Or     dextrose 50 % injection 25-50 mL    Or     glucagon injection 1 mg     diclofenac (VOLTAREN) 1 % topical gel 4 g     DOBUTamine (DOBUTREX) 1,000 mg in D5W 250 mL infusion (adult max conc)     DOPamine 800 mg in dextrose 5% 250 mL (adult max conc) - premix     fentaNYL (PF) (SUBLIMAZE) injection 25 mcg     FLUoxetine (PROzac) capsule 20 mg     insulin aspart (NovoLOG) inj (RAPID ACTING)     insulin aspart (NovoLOG) inj (RAPID ACTING)     insulin glargine (LANTUS PEN) injection 10 Units     lidocaine (LMX4) cream     lidocaine 1 % 0.1-1 mL     magnesium oxide (MAG-OX) tablet 400 mg     medication instruction     melatonin tablet 3 mg     naloxone (NARCAN) injection 0.1-0.4 mg     omeprazole (priLOSEC) CR capsule 20 mg     polyethylene glycol (MIRALAX) Packet 17 g     senna-docusate (SENOKOT-S/PERICOLACE) 8.6-50 MG per tablet 1 tablet    Or     senna-docusate (SENOKOT-S/PERICOLACE) 8.6-50 MG per tablet 2 tablet     sodium chloride (PF) 0.9% PF flush 3 mL     sodium chloride (PF) 0.9% PF flush 3 mL     vitamin B1 (THIAMINE) tablet 100 mg          Review of Systems:   See above.           Exam:     Vitals:    02/17/20 1025 02/17/20 1044 02/17/20 1055 02/17/20 1100   Pulse: 67 71 66    Resp: 18 17 17 8   Temp: 97.4  F (36.3  C) 97.8  F (36.6  C) 97.7  F (36.5  C)    TempSrc: Oral Oral Oral    SpO2:    99%   Weight:       Height:       Alert, no acute distress  Breathing appears comfortable.  Central line accessed for the procedure.           Data:       BMP  Recent Labs   Lab 02/17/20  0435 02/17/20  0022 02/16/20  1552 02/16/20  1011 02/16/20  0400     --  137 138 139   POTASSIUM 3.3*  --  3.8 3.8 3.4   CHLORIDE 100  --  100 101 104   CALOS 9.4  --  10.1  10.4* 9.2   CO2 35*  --  34* 29 31   BUN 37* 38* 37* 37* 37*   CR 1.56* 1.62* 1.61* 1.70* 1.63*   *  --  195* 195* 148*     CBC  Recent Labs   Lab 02/17/20  0435 02/16/20  0400 02/15/20  1658 02/15/20  1354 02/15/20  0328   WBC 12.6* 13.1* 12.2*  --  12.2*   RBC 4.71 4.67 4.90  --  4.72   HGB 13.5 13.4 13.6 13.2* 13.2*   HCT 41.5 41.8 43.5  --  42.6   MCV 88 90 89  --  90   MCH 28.7 28.7 27.8  --  28.0   MCHC 32.5 32.1 31.3*  --  31.0*   RDW 18.2* 18.8* 18.7*  --  18.9*    143* 126*  --  118*     INR  Recent Labs   Lab 02/16/20  0747 02/15/20  1658 02/13/20  0206   INR 1.33* 1.31* 1.55*     HIT Screen negative (2/11/20)  HIT Screen 3.6 units/mL (2/14/20)  DAVINA was negative.    HIT ELISAs:  2/15/20 1354 - Pre TPE = 0.45 Optical Densitiy(OD) (Indeterminate)  2/15/20 1658 -  Post TPE = 0.31 OD ( Negative )  2/17/20 0435 - Pre TPE #3 = 0.36 OD (Negative)            Procedure Summary:   A single volume therapeutic plasma exchange was performed and donor plasma was used as the replacement fluid.  A dual lumen central line was used for access and allowed for appropriate flow during the procedure.  ACD-A was used for anticoagulation. To offset the effects of the citrate, calcium gluconate was given in the return line.  The patient's vital signs were stable throughout.  The patient tolerated the procedure well without complication.  See apheresis flowsheet for additional details.      Attestation: During the procedure the patient was directly seen and evaluated by me, Mac Adan MD.  The renal fellow rotating on the transfusion medicine service, Wong Liriano, and the pathology resident, Andria Griggs, were also present for the evaluation.    Mac Adan MD  Transfusion Medicine Attending  Laboratory Medicine and Pathology  Pager [phone]

## 2020-02-17 NOTE — PROGRESS NOTES
Cardiology Progress Note  Eliseo Tanner MRN: [id number]  Age: 64 year old, : 1955  Date: 2020              Assessment and Plan:     Mr Eliseo Tanner is a 65yo M w/PMHx notable for chronic systolic heart failure, NICM, moderate CAD, HTN, ABHINAV on CPAP, DM2, CKDIII who is transferred to [facility] for evaluation and management of advanced heart failure with arrhythmia.     Today's plan  -Plan for LVAD on  PM  -Continue PLEX one more run  -Hold off on CRRT today   -Daily surveillance blood cultures   -Keep dobutamine at 7.5, dopamine at 2.5    Moderate CAD  Severe Mitral regurgitation  Chronic nonischemic systolic heart failure w/ reduced EF (15-20%) and exacerbation  NYHA Class III. Echo 2020: LVEF 15-20%; LVEDd 5.9 cm; 4+ MR. [facility] 2019 w/ nonobstructive CAD w/ severe branch disease. Presented with increased wt gain, SOB, LE edema concerning for HF exacerbation, initially concerning for cardiogenic shock. Admitted to [facility] for further evaluation regarding need for advanced HF therapies. Overall, impression is that once patient is the most compensated on  with clearing Bcx and improving renal function for LVAD surgery. While we could wait for OHT, patient may lose candidacy for any advanced therapy option.  -[place] placed: hemodynamics w/ Jane CO/CI q6h  -Telemetry  -Lactic acid, VBG ordered; trend  -Beta-blocker: None due to decompensated HF  -ACEi/ARB/ARNI: Holding home Entresto  mg BID  -Aldosterone antagonist: Holding spironolactone until renal assessed   -Volume status: Hypervolemic on exam. Weight on admit 240. Baseline weight 225-230.  -Diuretic regimen: holding today   -Keep dobutamine at 7.5, dopamine at 2.5  -Afterload reduction: holding in setting of being on dopmaine  -Continue ASA 81, atorvastatin 20 mg    Proteus and Enterococcus bacteremia  Organisms growing from 2/2 blood cultures from . Blood cultured from  NGTD and 2/15 growing 1/2 S.epi,  "likely contaminant per ID.  ID consulted, appreciate help.  -daily blood cultures until negative  -Zosyn (2/13-2/14)  -Tobramycin (2/13-2/14)  -Vancomycin (2/13-2/17  -Meropenem (2/14-2/15)  -Ceftriaxone (2/16-  -Ampicillin (2/16-    BERNARDA on CKDIII  Cr on admission 1.7, baseline Cr 1.5-2. Goal to improve renal function with diuresis in order to make best candidate for potential LVAD.  -Trend Cr  -Daily fluid balance  -Diuresis as above    Precapillary pulmonary hypertension:  Significant transpulmonary gradient of 14 on right heart cath numbers. May be related to CTEPH vs. WHO I.    #Thrombocytopenia:  Concern for HIT given timing with respect to heparin exposure. HIT positive DAVINA pending   -Heme consulted  -Bivalirudin gtt   -PLEX per transfusion medicine     VFib requiring shock  Shocked x 3 prior to transfer, loaded with amio. No known prior history. Suspect related to underlying HF.   -Keep K>4, Mg>2  -Amio 400 mg PO BID  -Need ICD for secondary prevention     Diabetes Mellitus Type II  Last A1c 7.2% 2/6/20  Home regimen includes: 15U basaglar at bedtime, glipizide 2.5  -Will resume home glargine at 30% reduction, 10U QHS  -Holding home oral regimen while renal function and/or hemodynamic status is either impaired or threatened  -Preprandial blood glucose target <140 mg/dL and random <180mg/dl, or 140-180 mg/dL for critically ill  -SSI insulin, q4h blood sugar checks, hypoglycemia protocol ordered     Chronic:  ABHINAV: Home CPAP  Gout: PTA allopurinol 200 daily  MDD: PTA fluoxetine  GERD: PTA PPI  COPD: albuterol PRN    FEN:  2gm Na   2L water restriction    PPX: Bivalirudin    CODE: FULL CODE     Stanford Acuna M.D.  Cardiology fellow             Subjective/Interval Events     Overnight, no acute events. This AM, patient denies any SOB, CP, lightheadedness, dizziness.          Objective     BP (!) 152/54   Pulse 87   Temp 97.7  F (36.5  C) (Axillary)   Resp 24   Ht 1.702 m (5' 7\")   Wt 97 kg (213 lb 12.8 oz)   " SpO2 100%   BMI 33.49 kg/m    Temp:  [97.5  F (36.4  C)-97.9  F (36.6  C)] 97.7  F (36.5  C)  Pulse:  [73-99] 87  Heart Rate:  [] 76  Resp:  [0-50] 24  BP: (132-163)/(35-54) 152/54  MAP:  [73 mmHg-114 mmHg] 92 mmHg  Arterial Line BP: (112-177)/(28-66) 148/46  SpO2:  [94 %-100 %] 100 %  Wt Readings from Last 2 Encounters:   02/17/20 97 kg (213 lb 12.8 oz)     I/O last 3 completed shifts:  In: 2447.07 [P.O.:840; I.V.:1607.07]  Out: 3430 [Urine:3430]      Gen: No acute distress  HEENT: NC/AT, PERRL, EOM intact, MMM, OP without exudates  PULM/THORAX: Clear to auscultation bilaterally, no rales/rhonchi/wheezes  CV: normal rate, regular rhythm, normal S1 and S2, no murmurs or rubs.  ABD: Soft, NTND, bowel sounds present, no masses  EXT: WWP. No LE edema, clubbing or cyanosis.  NEURO: CN II-XII grossly intact. A&Ox3    Lines:  -R IJ PA catheter  -Radial arterial line  -L femoral arterial IABP            Data:     Recent Results (from the past 24 hour(s))   Basic metabolic panel    Collection Time: 02/16/20 10:11 AM   Result Value Ref Range    Sodium 138 133 - 144 mmol/L    Potassium 3.8 3.4 - 5.3 mmol/L    Chloride 101 94 - 109 mmol/L    Carbon Dioxide 29 20 - 32 mmol/L    Anion Gap 9 3 - 14 mmol/L    Glucose 195 (H) 70 - 99 mg/dL    Urea Nitrogen 37 (H) 7 - 30 mg/dL    Creatinine 1.70 (H) 0.66 - 1.25 mg/dL    GFR Estimate 41 (L) >60 mL/min/[1.73_m2]    GFR Estimate If Black 47 (L) >60 mL/min/[1.73_m2]    Calcium 10.4 (H) 8.5 - 10.1 mg/dL   Calcium ionized whole blood    Collection Time: 02/16/20 10:11 AM   Result Value Ref Range    Calcium Ionized Whole Blood 4.4 4.4 - 5.2 mg/dL   Partial thromboplastin time    Collection Time: 02/16/20 12:05 PM   Result Value Ref Range    PTT 50 (H) 22 - 37 sec   Blood gas venous with oxyhemoglobin (PCU Collect TBD)    Collection Time: 02/16/20 12:05 PM   Result Value Ref Range    Ph Venous 7.43 7.32 - 7.43 pH    PCO2 Venous 53 (H) 40 - 50 mm Hg    PO2 Venous 31 25 - 47 mm Hg     Bicarbonate Venous 35 (H) 21 - 28 mmol/L    FIO2 21     Oxyhemoglobin Venous 53 %    Base Excess Venous 8.8 mmol/L   Glucose by meter    Collection Time: 02/16/20 12:08 PM   Result Value Ref Range    Glucose 222 (H) 70 - 99 mg/dL   Blood culture    Collection Time: 02/16/20 12:09 PM   Result Value Ref Range    Specimen Description Blood Left Hand     Culture Micro No growth after 17 hours    Blood culture    Collection Time: 02/16/20 12:14 PM   Result Value Ref Range    Specimen Description Blood Right Hand     Culture Micro No growth after 17 hours    Plasma prepare order mL    Collection Time: 02/16/20 12:23 PM   Result Value Ref Range    Blood Component Type Plasma     Units Ordered 11     Transfuse mLs ordered 3,100 mL   Blood component    Collection Time: 02/16/20 12:23 PM   Result Value Ref Range    Unit Number [id number]     Blood Component Type Plasma, Thawed     Division Number 00     Status of Unit Released to care unit 02/17/2020 0758     Blood Product Code V1827Z10     Unit Status ISS    Blood component    Collection Time: 02/16/20 12:23 PM   Result Value Ref Range    Unit Number [id number]     Blood Component Type Plasma, Thawed     Division Number 00     Status of Unit Released to care unit 02/17/2020 0758     Blood Product Code Z8827B79     Unit Status ISS    Blood component    Collection Time: 02/16/20 12:23 PM   Result Value Ref Range    Unit Number [id number]     Blood Component Type Plasma, Thawed     Division Number 00     Status of Unit Released to care unit 02/17/2020 0758     Blood Product Code M7488A11     Unit Status ISS    Blood component    Collection Time: 02/16/20 12:23 PM   Result Value Ref Range    Unit Number [id number]     Blood Component Type Plasma, Thawed     Division Number 00     Status of Unit Released to care unit 02/17/2020 0758     Blood Product Code V1072Z63     Unit Status ISS    Blood component    Collection Time: 02/16/20 12:23 PM   Result Value Ref Range     Unit Number [id number]     Blood Component Type Apheresis Plasma Thawed     Division Number 00     Status of Unit Released to care unit 02/17/2020 0758     Blood Product Code Q2234D53     Unit Status ISS    Blood component    Collection Time: 02/16/20 12:23 PM   Result Value Ref Range    Unit Number [id number]     Blood Component Type Apheresis Plasma Thawed     Division Number 00     Status of Unit Released to care unit 02/17/2020 0758     Blood Product Code F0679X54     Unit Status ISS    Blood component    Collection Time: 02/16/20 12:23 PM   Result Value Ref Range    Unit Number [id number]     Blood Component Type Plasma, Thawed     Division Number 00     Status of Unit Released to care unit 02/17/2020 0758     Blood Product Code J3310S57     Unit Status ISS    Blood component    Collection Time: 02/16/20 12:23 PM   Result Value Ref Range    Unit Number [id number]     Blood Component Type Plasma, Thawed     Division Number 00     Status of Unit Released to care unit 02/17/2020 0758     Blood Product Code S9867S67     Unit Status ISS    Blood component    Collection Time: 02/16/20 12:23 PM   Result Value Ref Range    Unit Number [id number]     Blood Component Type Plasma, Thawed     Division Number 00     Status of Unit Released to care unit 02/17/2020 0758     Blood Product Code W8493S48     Unit Status ISS    Blood component    Collection Time: 02/16/20 12:23 PM   Result Value Ref Range    Unit Number [id number]     Blood Component Type Apheresis Plasma Thawed     Division Number 00     Status of Unit Released to care unit 02/17/2020 0758     Blood Product Code H1927U89     Unit Status ISS    Blood component    Collection Time: 02/16/20 12:23 PM   Result Value Ref Range    Unit Number [id number]     Blood Component Type Plasma, Thawed     Division Number 00     Status of Unit Released to care unit 02/17/2020 0758     Blood Product Code T0151H63     Unit Status ISS    Glucose by meter     Collection Time: 02/16/20  3:49 PM   Result Value Ref Range    Glucose 180 (H) 70 - 99 mg/dL   Blood gas venous with oxyhemoglobin (PCU Collect TBD)    Collection Time: 02/16/20  3:51 PM   Result Value Ref Range    Ph Venous 7.44 (H) 7.32 - 7.43 pH    PCO2 Venous 54 (H) 40 - 50 mm Hg    PO2 Venous 34 25 - 47 mm Hg    Bicarbonate Venous 37 (H) 21 - 28 mmol/L    FIO2 21     Oxyhemoglobin Venous 60 %    Base Excess Venous 10.8 mmol/L   Basic metabolic panel    Collection Time: 02/16/20  3:52 PM   Result Value Ref Range    Sodium 137 133 - 144 mmol/L    Potassium 3.8 3.4 - 5.3 mmol/L    Chloride 100 94 - 109 mmol/L    Carbon Dioxide 34 (H) 20 - 32 mmol/L    Anion Gap 3 3 - 14 mmol/L    Glucose 195 (H) 70 - 99 mg/dL    Urea Nitrogen 37 (H) 7 - 30 mg/dL    Creatinine 1.61 (H) 0.66 - 1.25 mg/dL    GFR Estimate 44 (L) >60 mL/min/[1.73_m2]    GFR Estimate If Black 51 (L) >60 mL/min/[1.73_m2]    Calcium 10.1 8.5 - 10.1 mg/dL   Blood gas venous with oxyhemoglobin (PCU Collect TBD)    Collection Time: 02/16/20  8:14 PM   Result Value Ref Range    Ph Venous 7.44 (H) 7.32 - 7.43 pH    PCO2 Venous 53 (H) 40 - 50 mm Hg    PO2 Venous 32 25 - 47 mm Hg    Bicarbonate Venous 36 (H) 21 - 28 mmol/L    FIO2 21     Oxyhemoglobin Venous 56 %    Base Excess Venous 10.0 mmol/L   Glucose by meter    Collection Time: 02/16/20 10:28 PM   Result Value Ref Range    Glucose 137 (H) 70 - 99 mg/dL   Urea nitrogen    Collection Time: 02/17/20 12:22 AM   Result Value Ref Range    Urea Nitrogen 38 (H) 7 - 30 mg/dL   Creatinine    Collection Time: 02/17/20 12:22 AM   Result Value Ref Range    Creatinine 1.62 (H) 0.66 - 1.25 mg/dL    GFR Estimate 43 (L) >60 mL/min/[1.73_m2]    GFR Estimate If Black 50 (L) >60 mL/min/[1.73_m2]   Basic metabolic panel    Collection Time: 02/17/20  4:35 AM   Result Value Ref Range    Sodium 138 133 - 144 mmol/L    Potassium 3.3 (L) 3.4 - 5.3 mmol/L    Chloride 100 94 - 109 mmol/L    Carbon Dioxide 35 (H) 20 - 32 mmol/L     Anion Gap 4 3 - 14 mmol/L    Glucose 142 (H) 70 - 99 mg/dL    Urea Nitrogen 37 (H) 7 - 30 mg/dL    Creatinine 1.56 (H) 0.66 - 1.25 mg/dL    GFR Estimate 45 (L) >60 mL/min/[1.73_m2]    GFR Estimate If Black 53 (L) >60 mL/min/[1.73_m2]    Calcium 9.4 8.5 - 10.1 mg/dL   CBC with platelets    Collection Time: 20  4:35 AM   Result Value Ref Range    WBC 12.6 (H) 4.0 - 11.0 10e9/L    RBC Count 4.71 4.4 - 5.9 10e12/L    Hemoglobin 13.5 13.3 - 17.7 g/dL    Hematocrit 41.5 40.0 - 53.0 %    MCV 88 78 - 100 fl    MCH 28.7 26.5 - 33.0 pg    MCHC 32.5 31.5 - 36.5 g/dL    RDW 18.2 (H) 10.0 - 15.0 %    Platelet Count 179 150 - 450 10e9/L   Partial thromboplastin time    Collection Time: 20  4:35 AM   Result Value Ref Range    PTT 47 (H) 22 - 37 sec   Blood gas venous with oxyhemoglobin (PCU Collect TBD)    Collection Time: 20  4:35 AM   Result Value Ref Range    Ph Venous 7.44 (H) 7.32 - 7.43 pH    PCO2 Venous 54 (H) 40 - 50 mm Hg    PO2 Venous 38 25 - 47 mm Hg    Bicarbonate Venous 37 (H) 21 - 28 mmol/L    FIO2 3L     Oxyhemoglobin Venous 68 %    Base Excess Venous 10.8 mmol/L   Blood gas venous with oxyhemoglobin (PCU Collect TBD)    Collection Time: 20  8:22 AM   Result Value Ref Range    Ph Venous 7.44 (H) 7.32 - 7.43 pH    PCO2 Venous 28 (L) 40 - 50 mm Hg    PO2 Venous 39 25 - 47 mm Hg    Bicarbonate Venous 19 (L) 21 - 28 mmol/L    FIO2 21.0     Oxyhemoglobin Venous 72 %    Base Deficit Venous 4.5 mmol/L       Recent Results (from the past 24 hour(s))   Echocardiogram Complete    Narrative    [id number]  [id number]  [id number]  [id number]^MATT^NAHUN^JIM           [facility],[place]  Echocardiography Laboratory  [address] [place] [address]     Name: WOLFGANG LEACH  MRN: [id number]  : 1955  Study Date: 2020 10:06 AM  Age: 64 yrs  Gender: Male  Patient Location: [facility]  Reason For Study: Heart Failure  Ordering Physician: NAHUN GUTIERREZ  Referring Physician:  SELF, REFERRED  Performed By: Yvonne Adan RDCS     BSA: 2.2 m2  Height: 67 in  Weight: 244 lb  HR: 103  BP: 72/52 mmHg  _____________________________________________________________________________  __        Procedure  Complete Portable Echo Adult. Contrast Optison. Optison (NDC #1361-0458-36)  given intravenously. Patient was given 6 ml mixture of 3 ml Optison and 6 ml  saline. 3 ml wasted.  _____________________________________________________________________________  __        Interpretation Summary  Left ventricular size is normal.  LVEF 20% based on biplane 2D tracing.  Mild to moderate right ventricular dilation is present.  Global right ventricular function is moderately reduced.  Pulmonary artery systolic pressure is normal.  Dilation of the inferior vena cava is present with abnormal respiratory  variation in diameter.  _____________________________________________________________________________  __        Left Ventricle  Left ventricular size is normal. LVEF 20% based on biplane 2D tracing. Left  ventricular wall thickness is normal. Diastolic function not assessed due to  frequent ectopy. Abnormal septal motion consistent with left bundle branch  block is present.     Right Ventricle  Mild to moderate right ventricular dilation is present. Global right  ventricular function is moderately reduced.     Atria  Mild biatrial enlargement is present.     Mitral Valve  Moderate mitral insufficiency is present.        Aortic Valve  Aortic valve is normal in structure and function.     Tricuspid Valve  Moderate tricuspid insufficiency is present. The right ventricular systolic  pressure is approximated at 24.4 mmHg plus the right atrial pressure.  Pulmonary artery systolic pressure is normal.     Pulmonic Valve  The pulmonic valve cannot be assessed. Mild pulmonic insufficiency is present.     Vessels  The aorta root is normal. The pulmonary artery cannot be assessed. Dilation of  the inferior vena cava is  present with abnormal respiratory variation in  diameter.     Compared to Previous Study  Previous study not available for comparison.     _____________________________________________________________________________  __  MMode/2D Measurements & Calculations  IVSd: 0.61 cm  LVIDd: 4.7 cm  LVIDs: 3.7 cm  LVPWd: 0.75 cm  FS: 21.7 %  LV mass(C)d: 99.1 grams  LV mass(C)dI: 45.0 grams/m2     Ao root diam: 2.9 cm  asc Aorta Diam: 2.5 cm  LVOT diam: 1.9 cm  LVOT area: 2.8 cm2     EF(MOD-bp): 21.5 %  LA Volume (BP): 84.8 ml  LA Volume Index (BP): 38.5 ml/m2  RWT: 0.32        Doppler Measurements & Calculations  PA acc time: 0.09 sec     PI end-d saumya: 154.0 cm/sec  TR max saumya: 247.0 cm/sec  TR max P.4 mmHg     _____________________________________________________________________________  __           Report approved by: Graciela Tomlinson 2020 12:05 PM      XR Chest Port 1 View    Narrative    XR CHEST PORT 1 VW  2020 4:21 PM      HISTORY: SG Placement    COMPARISON: None available    FINDINGS: Single AP chest radiograph. [place] catheter tip is in the  proximal right main pulmonary artery. Right central line tip and right  upper extremity PICC line tip at the lower SVC. Trachea is midline,  cardiomegaly. Bilateral perihilar streaky opacities. Mild increased  interstitial opacities in the right lung with ill-defined pulmonary  vascularity. No pneumothorax or pleural effusion. No acute osseous or  abdominal abnormality. Elevated left hemidiaphragm.      Impression    IMPRESSION:  1. [place] catheter tip at the proximal right main pulmonary artery.  2. Cardiomegaly with mild pulmonary edema, especially on the right.    I have personally reviewed the examination and initial interpretation  and I agree with the findings.    DONG SOLORIO MD   Cardiac Catheterization    Narrative      Successful insertion of a IABP in the RFA                Medications     Current Facility-Administered Medications    Medication     acetaminophen (TYLENOL) tablet 650 mg     albuterol (PROAIR HFA/PROVENTIL HFA/VENTOLIN HFA) 108 (90 Base) MCG/ACT inhaler 2 puff     allopurinol (ZYLOPRIM) tablet 200 mg     alteplase (CATHFLO ACTIVASE) injection 2 mg     alteplase (CATHFLO ACTIVASE) injection 2 mg     alteplase (CATHFLO ACTIVASE) injection 2 mg     alteplase (CATHFLO ACTIVASE) injection 2 mg     amiodarone (PACERONE) tablet 400 mg     Anticoagulant Citrate Dextrose Formula A at ratio of 1:10 with blood ([facility])     anticoagulant citrate flush 3 mL     anticoagulant citrate flush 3 mL     aspirin (ASA) chewable tablet 81 mg     atorvastatin (LIPITOR) tablet 20 mg     bisacodyl (DULCOLAX) EC tablet 5 mg    Or     bisacodyl (DULCOLAX) EC tablet 10 mg    Or     bisacodyl (DULCOLAX) EC tablet 15 mg     bivalirudin (ANGIOMAX) 250 mg in sodium chloride 0.9 % 250 mL ANTICOAGULANT infusion     calcium carbonate (TUMS) chewable tablet 500 mg     calcium gluconate with plasma (administered by [facility]heres[facility] Staff ONLY)     cefTRIAXone (ROCEPHIN) 2 g vial to attach to  ml bag for ADULTS or NS 50 ml bag for PEDS     co-enzyme Q-10 capsule 200 mg     glucose gel 15-30 g    Or     dextrose 50 % injection 25-50 mL    Or     glucagon injection 1 mg     diclofenac (VOLTAREN) 1 % topical gel 4 g     diphenhydrAMINE (BENADRYL) injection 50 mg     DOBUTamine (DOBUTREX) 1,000 mg in D5W 250 mL infusion (adult max conc)     DOPamine 800 mg in dextrose 5% 250 mL (adult max conc) - premix     fentaNYL (PF) (SUBLIMAZE) injection 25 mcg     FLUoxetine (PROzac) capsule 20 mg     insulin aspart (NovoLOG) inj (RAPID ACTING)     insulin aspart (NovoLOG) inj (RAPID ACTING)     insulin glargine (LANTUS PEN) injection 10 Units     lidocaine (LMX4) cream     lidocaine 1 % 0.1-1 mL     magnesium oxide (MAG-OX) tablet 400 mg     medication instruction     melatonin tablet 3 mg     naloxone (NARCAN) injection 0.1-0.4 mg     omeprazole (priLOSEC) CR capsule 20  mg     polyethylene glycol (MIRALAX) Packet 17 g     senna-docusate (SENOKOT-S/PERICOLACE) 8.6-50 MG per tablet 1 tablet    Or     senna-docusate (SENOKOT-S/PERICOLACE) 8.6-50 MG per tablet 2 tablet     sodium chloride (PF) 0.9% PF flush 10 mL     sodium chloride (PF) 0.9% PF flush 10 mL     sodium chloride (PF) 0.9% PF flush 10 mL     sodium chloride (PF) 0.9% PF flush 10 mL     sodium chloride (PF) 0.9% PF flush 3 mL     sodium chloride (PF) 0.9% PF flush 3 mL     vancomycin (VANCOCIN) 2,000 mg in sodium chloride 0.9 % 250 mL intermittent infusion     vitamin B1 (THIAMINE) tablet 100 mg

## 2020-02-17 NOTE — PROGRESS NOTES
Nephrology Progress Note  02/17/2020         Mr Eliseo Tanner is a 63yo M with  H/O  Stage D Class IV HF,NICM( LVEF 15-20 %) moderate CAD, HTN, DM2, CKD, admitted to [facility] on 2/7/20 with Cardiogenic shock and V fib with multiple ICD shocks. Patient is not responding to diuresis , on pressure support . Developed rigors and there is concern for septic shock .  Nephrology consulted for BERNARDA, started CRRT 2/13 for volume management.       Interval History :   Mr Tanner had CRRT stopped 2/15/20, made 3.7L of Urine. Creatinine is stable.  Discussed with Cardiology team.  He no longer needs dialysis.    Will sign off.   Please call with any questions.    Jarvis Saunders MD  Division of Renal Disease and Hypertension  Pager: [phone]  February 17, 2020  11:07 AM     Labs:   All labs reviewed by me  Electrolytes/Renal -   Recent Labs   Lab Test 02/17/20  0435 02/17/20  0022 02/16/20  1552 02/16/20  1011 02/16/20  0400  02/15/20  1658 02/15/20  1034     --  137 138 139   < > 136 136   POTASSIUM 3.3*  --  3.8 3.8 3.4   < > 3.7 4.1   CHLORIDE 100  --  100 101 104   < > 103 105   CO2 35*  --  34* 29 31   < > 26 24   BUN 37* 38* 37* 37* 37*   < > 28 24   CR 1.56* 1.62* 1.61* 1.70* 1.63*   < > 1.34* 1.16   *  --  195* 195* 148*   < > 241* 116*   CALOS 9.4  --  10.1 10.4* 9.2   < > 11.3* 8.5   MAG  --   --   --   --  2.0  --  2.2 2.2   PHOS  --   --   --   --  4.7*  --  4.1 3.8    < > = values in this interval not displayed.       CBC -   Recent Labs   Lab Test 02/17/20  0435 02/16/20  0400 02/15/20  1658   WBC 12.6* 13.1* 12.2*   HGB 13.5 13.4 13.6    143* 126*       LFTs -   Recent Labs   Lab Test 02/16/20  0400 02/15/20  1658 02/15/20  0328   ALKPHOS 124 121 146   BILITOTAL 0.5 0.6 0.8   ALT 44 47 82*   AST 37 43 82*   PROTTOTAL 6.5* 6.5* 7.0   ALBUMIN 2.8* 2.8* 2.5*       Iron Panel -   Recent Labs   Lab Test 02/08/20  0408   IRON 25*   IRONSAT 8*   HEAVENLY 189           Current Medications:    allopurinol  200 mg  Oral Daily     amiodarone  400 mg Oral Daily     ampicillin  2 g Intravenous Q6H     anticoagulant citrate  3 mL Intracatheter Once     anticoagulant citrate  3 mL Intracatheter Once     aspirin  81 mg Oral Daily     atorvastatin  20 mg Oral QPM     cefTRIAXone  2 g Intravenous Q12H     co-enzyme Q-10  200 mg Oral Daily     diclofenac  4 g Topical 4x Daily     FLUoxetine  20 mg Oral Daily     insulin aspart  1-7 Units Subcutaneous TID AC     insulin aspart  1-5 Units Subcutaneous At Bedtime     insulin glargine  10 Units Subcutaneous At Bedtime     magnesium oxide  400 mg Oral Daily     omeprazole  20 mg Oral QAM AC     senna-docusate  1 tablet Oral or Feeding Tube BID    Or     senna-docusate  2 tablet Oral or Feeding Tube BID     sodium chloride (PF)  10 mL Intracatheter Once     sodium chloride (PF)  10 mL Intracatheter Once     sodium chloride (PF)  10 mL Intracatheter Once     sodium chloride (PF)  10 mL Intracatheter Once     sodium chloride (PF)  3 mL Intracatheter Q8H     vancomycin (VANCOCIN) IV  2,000 mg (central catheter) Intravenous Q24H     vitamin B1  100 mg Oral Daily       bivalirudin ANTICOAGULANT (ANGIOMAX) 250mg/250mL in 0.9% Sodium Chloride 0.024 mg/kg/hr (02/17/20 0900)     DOBUTamine 7.5 mcg/kg/min (02/17/20 0900)     DOPamine 2.5 mcg/kg/min (02/17/20 0900)     - MEDICATION INSTRUCTIONS -

## 2020-02-17 NOTE — PROGRESS NOTES
[facility]  General Infectious Disease Progress Note     Patient:  Eliseo Tanner, Date of birth 1955, Medical record number [id number]  Date of Visit:  February 16, 2020         Assessment and Recommendations:   Problem List:  1. Bacteremia with Proteus mirabilis and Enterococcus faecalis in 2/2 bottles on 2/13. Cultures from 2/14 are negative to date. Urine culture was negative on 2/13 making a urinary source unlikely. Lines were suspected as a source and exchanged. CT C/A/P from 2/8 without any obvious intra-abdominal source.   2. 1/2 Blood cultures on 2/15 growing Staph epi from left hand - likely a contaminate   3. Vfib arrest  4. Chronic systolic heart failure with exacerbation. Plan for LVAD placement mid-week  5. Type 2 diabetes  6. CKD 3  7. 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 Continue ceftriaxone 2g --> increase to BID dosing   2. Discontinue Vancomycin   3. Start Ampicillin 2 grams IV Q4H   4. Agree with plan for LVAD placement mid-week  5. Will decide a final tentative duration post LVAD placement     Discussion:   Mr Eliseo Tanner is a 63yo M with a history of chronic systolic heart failure, NICM, moderate CAD, HTN, DM2, CKDIII who was admitted to [facility] on 2/7/20 for evaluation of heart failure. Prior to arrival to the floor he was in Vfib and required shocks x 3. He was being diuresed as expected, and has been requiring inotropes and a balloon pump. On 2/13 morning had rigors and chills. Blood cultures (2/2) were positive for Proteus mirabilis and Enterococcus. I suspect this is most likely from his lines. Lines all removed and changed on 2/13 evening and balloon pump was also exchanged. His blood cultures from 2/14 remain negative to date.     He needs an LVAD in the near future with surgery tentatively planned for mid-week. This will allow for >72 hours of negative cultures prior to placement and will provide good possibility that device  will not become infected (at least not related to this episode of bacteremia).     He did have one of two blood cultures from the hand growing Staph epi on 2/15. I anticipate that this is a contaminate and not likely related to this current episode of bacteremia.      [facility]   Infectious Diseases   [id number]        Interval History:   No acute events overnight. Has remained afebrile.   White count trending down.   Denies any chest pain, SOB, cough.   No rashes.         Review of Systems:   4 point ROS including Respiratory, CV, GI and , other than that noted in the HPI,  is negative           Current Antimicrobials   Vancomycin 2/13-present  Ceftriaxone 2/15-present  Meropenem 2/14-2/15  Tobramycin x 1 2/13  Pip/tazo 2/13-2/14         Physical Exam:   Ranges for vital signs:  Temp:  [97.5  F (36.4  C)-97.9  F (36.6  C)] 97.6  F (36.4  C)  Pulse:  [67-99] 75  Heart Rate:  [] 67  Resp:  [0-50] 20  BP: (132-163)/(35-54) 152/54  MAP:  [73 mmHg-114 mmHg] 93 mmHg  Arterial Line BP: (112-177)/(28-66) 164/46  SpO2:  [94 %-100 %] 97 %    Exam:  GENERAL:  well-developed, well-nourished, in no acute distress.   ENT:  Head is normocephalic, atraumatic. Oropharynx is moist without exudates or ulcers.  EYES:  Eyes have anicteric sclerae. PERRL.   NECK:  Supple. No cervical lymphadenopathy  LUNGS:  Normal respiratory effort.   ABDOMEN:  Non-distended.   EXT: Extremities warm and without edema.  SKIN:  No acute rashes.   NEUROLOGIC:  Grossly nonfocal.         Laboratory Data:     Creatinine   Date Value Ref Range Status   02/17/2020 1.56 (H) 0.66 - 1.25 mg/dL Final   02/17/2020 1.62 (H) 0.66 - 1.25 mg/dL Final   02/16/2020 1.61 (H) 0.66 - 1.25 mg/dL Final   02/16/2020 1.70 (H) 0.66 - 1.25 mg/dL Final   02/16/2020 1.63 (H) 0.66 - 1.25 mg/dL Final     WBC   Date Value Ref Range Status   02/17/2020 12.6 (H) 4.0 - 11.0 10e9/L Final   02/16/2020 13.1 (H) 4.0 - 11.0 10e9/L Final   02/15/2020 12.2 (H) 4.0 - 11.0 10e9/L Final    02/15/2020 12.2 (H) 4.0 - 11.0 10e9/L Final   02/14/2020 13.5 (H) 4.0 - 11.0 10e9/L Final     Hemoglobin   Date Value Ref Range Status   02/17/2020 13.5 13.3 - 17.7 g/dL Final     Platelet Count   Date Value Ref Range Status   02/17/2020 179 150 - 450 10e9/L Final     CRP Inflammation   Date Value Ref Range Status   02/08/2020 21.0 (H) 0.0 - 8.0 mg/L Final     AST   Date Value Ref Range Status   02/16/2020 37 0 - 45 U/L Final   02/15/2020 43 0 - 45 U/L Final   02/15/2020 82 (H) 0 - 45 U/L Final   02/14/2020 107 (H) 0 - 45 U/L Final   02/13/2020 83 (H) 0 - 45 U/L Final     ALT   Date Value Ref Range Status   02/16/2020 44 0 - 70 U/L Final   02/15/2020 47 0 - 70 U/L Final   02/15/2020 82 (H) 0 - 70 U/L Final   02/14/2020 83 (H) 0 - 70 U/L Final   02/13/2020 58 0 - 70 U/L Final     Bilirubin Total   Date Value Ref Range Status   02/16/2020 0.5 0.2 - 1.3 mg/dL Final   02/15/2020 0.6 0.2 - 1.3 mg/dL Final   02/15/2020 0.8 0.2 - 1.3 mg/dL Final   02/14/2020 1.4 (H) 0.2 - 1.3 mg/dL Final   02/13/2020 1.8 (H) 0.2 - 1.3 mg/dL Final     Lab Results   Component Value Date     02/17/2020    BUN 37 (H) 02/17/2020    CO2 35 (H) 02/17/2020     Microbiology:     Culture data:  2/16: Blood culture x 2 - NG  2/15: Blood culture 1 of 2 growing staph epi  2/14 Blood culture NGTD  2/13 Blood culture: Enterococcus faecalis, sensis pending; Proteus mirabilis, widely sensitive  2/13 Urine culture negative

## 2020-02-17 NOTE — PROVIDER NOTIFICATION
Notified provider to increasing BP on arterial line as well as IABP.  MD aware, does not want interventions at this time, team aware of hypertensive BP, discussed IV hydralazine, no orders at this time. All other VS stable. Will continue to trend and update MD with any changes in condition per protocol.

## 2020-02-17 NOTE — PLAN OF CARE
ICU End of Shift Summary. See flowsheets for vital signs and detailed assessment.    Changes this shift: Dopamine decreased to 2.5 mcg/kg/min, Dobutamine decreased to 7.5 mcg/kg/min. Plasmapheresis (HIT positive) completed today without complication. Right internal jugular swan repositioned this morning by cardiology from 58cm to 53cm during wedge. Placement confirmed via chest x-ray.     IABP remains 1:1, 100% augmentation    LAD: right internal jugular swan, left internal jugular HD catheter, lombardo, left PIV x 2, right radial arterial line, left femoral IABP.    Plan:  JOSE q4hrs, NPO at midnight for VAD placement 2/18 (afternoon). Consent has been obtained and in the chart.

## 2020-02-18 ENCOUNTER — APPOINTMENT (OUTPATIENT)
Dept: GENERAL RADIOLOGY | Facility: CLINIC | Age: 65
DRG: 001 | End: 2020-02-18
Attending: INTERNAL MEDICINE
Payer: MEDICARE

## 2020-02-18 ENCOUNTER — APPOINTMENT (OUTPATIENT)
Dept: GENERAL RADIOLOGY | Facility: CLINIC | Age: 65
DRG: 001 | End: 2020-02-18
Attending: STUDENT IN AN ORGANIZED HEALTH CARE EDUCATION/TRAINING PROGRAM
Payer: MEDICARE

## 2020-02-18 ENCOUNTER — ANESTHESIA EVENT (OUTPATIENT)
Dept: SURGERY | Facility: CLINIC | Age: 65
DRG: 001 | End: 2020-02-18
Payer: MEDICARE

## 2020-02-18 ENCOUNTER — MEDICAL CORRESPONDENCE (OUTPATIENT)
Dept: HEALTH INFORMATION MANAGEMENT | Facility: CLINIC | Age: 65
End: 2020-02-18

## 2020-02-18 ENCOUNTER — ANESTHESIA (OUTPATIENT)
Dept: SURGERY | Facility: CLINIC | Age: 65
DRG: 001 | End: 2020-02-18
Payer: MEDICARE

## 2020-02-18 LAB
ALBUMIN SERPL-MCNC: 2.8 G/DL (ref 3.4–5)
ALP SERPL-CCNC: 83 U/L (ref 40–150)
ALT SERPL W P-5'-P-CCNC: 24 U/L (ref 0–70)
ANGLE RATE OF CLOT GROWTH: 70.3 DEG (ref 59–74)
ANGLE RATE OF CLOT GROWTH: 70.8 DEG (ref 59–74)
ANGLE RATE OF CLOT GROWTH: 73.4 DEG (ref 59–74)
ANION GAP SERPL CALCULATED.3IONS-SCNC: 3 MMOL/L (ref 3–14)
APTT PPP: 35 SEC (ref 22–37)
APTT PPP: 46 SEC (ref 22–37)
AST SERPL W P-5'-P-CCNC: 31 U/L (ref 0–45)
BASE DEFICIT BLDV-SCNC: 1.8 MMOL/L
BASE EXCESS BLDA CALC-SCNC: 10.4 MMOL/L
BASE EXCESS BLDA CALC-SCNC: 2.7 MMOL/L
BASE EXCESS BLDA CALC-SCNC: 3.4 MMOL/L
BASE EXCESS BLDA CALC-SCNC: 4.6 MMOL/L
BASE EXCESS BLDV CALC-SCNC: 1.4 MMOL/L
BASE EXCESS BLDV CALC-SCNC: 11.4 MMOL/L
BASE EXCESS BLDV CALC-SCNC: 12.8 MMOL/L
BILIRUB SERPL-MCNC: 0.4 MG/DL (ref 0.2–1.3)
BLD PROD TYP BPU: NORMAL
BLD UNIT ID BPU: 0
BLOOD PRODUCT CODE: NORMAL
BPU ID: NORMAL
BUN SERPL-MCNC: 39 MG/DL (ref 7–30)
CA-I BLD-MCNC: 3.1 MG/DL (ref 4.4–5.2)
CA-I BLD-MCNC: 4.3 MG/DL (ref 4.4–5.2)
CA-I BLD-MCNC: 4.5 MG/DL (ref 4.4–5.2)
CA-I BLD-MCNC: 4.6 MG/DL (ref 4.4–5.2)
CALCIUM SERPL-MCNC: 9.1 MG/DL (ref 8.5–10.1)
CHLORIDE SERPL-SCNC: 101 MMOL/L (ref 94–109)
CI HYPERCOAGULATION INDEX: 0 RATIO (ref 0–3)
CI HYPERCOAGULATION INDEX: 1.1 RATIO (ref 0–3)
CI HYPOCOAGULATION INDEX: 0.5 RATIO (ref 0–3)
CLOT LYSIS 30M P MA LENFR BLD TEG: 1 % (ref 0–8)
CLOT LYSIS 30M P MA LENFR BLD TEG: 1 % (ref 0–8)
CLOT LYSIS 30M P MA LENFR BLD TEG: 4 % (ref 0–8)
CLOT STRENGTH BLD TEG: 5.5 KD/SC (ref 5.3–13.2)
CLOT STRENGTH BLD TEG: 6.3 KD/SC (ref 5.3–13.2)
CLOT STRENGTH BLD TEG: 9 KD/SC (ref 5.3–13.2)
CO2 SERPL-SCNC: 36 MMOL/L (ref 20–32)
CREAT SERPL-MCNC: 1.48 MG/DL (ref 0.66–1.25)
ERYTHROCYTE [DISTWIDTH] IN BLOOD BY AUTOMATED COUNT: 18 % (ref 10–15)
ERYTHROCYTE [DISTWIDTH] IN BLOOD BY AUTOMATED COUNT: 18.2 % (ref 10–15)
FIBRINOGEN PPP-MCNC: 194 MG/DL (ref 200–420)
GFR SERPL CREATININE-BSD FRML MDRD: 48 ML/MIN/{1.73_M2}
GLUCOSE BLD-MCNC: 100 MG/DL (ref 70–99)
GLUCOSE BLD-MCNC: 186 MG/DL (ref 70–99)
GLUCOSE BLD-MCNC: 265 MG/DL (ref 70–99)
GLUCOSE BLD-MCNC: 285 MG/DL (ref 70–99)
GLUCOSE BLDC GLUCOMTR-MCNC: 106 MG/DL (ref 70–99)
GLUCOSE BLDC GLUCOMTR-MCNC: 114 MG/DL (ref 70–99)
GLUCOSE BLDC GLUCOMTR-MCNC: 124 MG/DL (ref 70–99)
GLUCOSE BLDC GLUCOMTR-MCNC: 142 MG/DL (ref 70–99)
GLUCOSE BLDC GLUCOMTR-MCNC: 147 MG/DL (ref 70–99)
GLUCOSE BLDC GLUCOMTR-MCNC: 157 MG/DL (ref 70–99)
GLUCOSE SERPL-MCNC: 112 MG/DL (ref 70–99)
HCO3 BLD-SCNC: 28 MMOL/L (ref 21–28)
HCO3 BLD-SCNC: 28 MMOL/L (ref 21–28)
HCO3 BLD-SCNC: 29 MMOL/L (ref 21–28)
HCO3 BLD-SCNC: 34 MMOL/L (ref 21–28)
HCO3 BLDV-SCNC: 21 MMOL/L (ref 21–28)
HCO3 BLDV-SCNC: 25 MMOL/L (ref 21–28)
HCO3 BLDV-SCNC: 37 MMOL/L (ref 21–28)
HCO3 BLDV-SCNC: 39 MMOL/L (ref 21–28)
HCT VFR BLD AUTO: 27.7 % (ref 40–53)
HCT VFR BLD AUTO: 37.6 % (ref 40–53)
HGB BLD-MCNC: 11 G/DL (ref 13.3–17.7)
HGB BLD-MCNC: 11.6 G/DL (ref 13.3–17.7)
HGB BLD-MCNC: 12.1 G/DL (ref 13.3–17.7)
HGB BLD-MCNC: 6.4 G/DL (ref 13.3–17.7)
HGB BLD-MCNC: 8.8 G/DL (ref 13.3–17.7)
HGB BLD-MCNC: 9.1 G/DL (ref 13.3–17.7)
INR PPP: 1.76 (ref 0.86–1.14)
K TIME TO SPEC CLOT STRENGTH: 1.2 MIN (ref 1–3)
K TIME TO SPEC CLOT STRENGTH: 1.3 MIN (ref 1–3)
K TIME TO SPEC CLOT STRENGTH: 1.4 MIN (ref 1–3)
LACTATE BLD-SCNC: 0.5 MMOL/L (ref 0.7–2)
LACTATE BLD-SCNC: 3.2 MMOL/L (ref 0.7–2)
LACTATE BLD-SCNC: 3.3 MMOL/L (ref 0.7–2)
LACTATE BLD-SCNC: 3.6 MMOL/L (ref 0.7–2)
LDH SERPL L TO P-CCNC: 294 U/L (ref 85–227)
LY60 LYSIS AT 60 MINUTES: 4 % (ref 0–15)
LY60 LYSIS AT 60 MINUTES: 5 % (ref 0–15)
LY60 LYSIS AT 60 MINUTES: 9.3 % (ref 0–15)
MA MAXIMUM CLOT STRENGTH: 52.3 MM (ref 55–74)
MA MAXIMUM CLOT STRENGTH: 55.7 MM (ref 55–74)
MA MAXIMUM CLOT STRENGTH: 64.2 MM (ref 55–74)
MAGNESIUM SERPL-MCNC: 1.7 MG/DL (ref 1.6–2.3)
MCH RBC QN AUTO: 28.1 PG (ref 26.5–33)
MCH RBC QN AUTO: 28.5 PG (ref 26.5–33)
MCHC RBC AUTO-ENTMCNC: 31.8 G/DL (ref 31.5–36.5)
MCHC RBC AUTO-ENTMCNC: 32.2 G/DL (ref 31.5–36.5)
MCV RBC AUTO: 87 FL (ref 78–100)
MCV RBC AUTO: 90 FL (ref 78–100)
NUM BPU REQUESTED: 2
NUM BPU REQUESTED: 4
NUM BPU REQUESTED: 5
O2/TOTAL GAS SETTING VFR VENT: 100 %
O2/TOTAL GAS SETTING VFR VENT: 21 %
O2/TOTAL GAS SETTING VFR VENT: 80 %
O2/TOTAL GAS SETTING VFR VENT: ABNORMAL %
OXYHGB MFR BLDV: 66 %
OXYHGB MFR BLDV: 69 %
OXYHGB MFR BLDV: 71 %
OXYHGB MFR BLDV: 75 %
PCO2 BLD: 40 MM HG (ref 35–45)
PCO2 BLD: 40 MM HG (ref 35–45)
PCO2 BLD: 43 MM HG (ref 35–45)
PCO2 BLD: 46 MM HG (ref 35–45)
PCO2 BLDV: 29 MM HG (ref 40–50)
PCO2 BLDV: 35 MM HG (ref 40–50)
PCO2 BLDV: 50 MM HG (ref 40–50)
PCO2 BLDV: 53 MM HG (ref 40–50)
PH BLD: 7.39 PH (ref 7.35–7.45)
PH BLD: 7.43 PH (ref 7.35–7.45)
PH BLD: 7.46 PH (ref 7.35–7.45)
PH BLD: 7.54 PH (ref 7.35–7.45)
PH BLDV: 7.47 PH (ref 7.32–7.43)
PH BLDV: 7.47 PH (ref 7.32–7.43)
PH BLDV: 7.48 PH (ref 7.32–7.43)
PH BLDV: 7.48 PH (ref 7.32–7.43)
PHOSPHATE SERPL-MCNC: 4.2 MG/DL (ref 2.5–4.5)
PLATELET # BLD AUTO: 168 10E9/L (ref 150–450)
PLATELET # BLD AUTO: 199 10E9/L (ref 150–450)
PO2 BLD: 104 MM HG (ref 80–105)
PO2 BLD: 240 MM HG (ref 80–105)
PO2 BLD: 419 MM HG (ref 80–105)
PO2 BLD: 510 MM HG (ref 80–105)
PO2 BLDV: 36 MM HG (ref 25–47)
PO2 BLDV: 38 MM HG (ref 25–47)
PO2 BLDV: 38 MM HG (ref 25–47)
PO2 BLDV: 42 MM HG (ref 25–47)
POTASSIUM BLD-SCNC: 3.2 MMOL/L (ref 3.4–5.3)
POTASSIUM BLD-SCNC: 3.4 MMOL/L (ref 3.4–5.3)
POTASSIUM BLD-SCNC: 3.4 MMOL/L (ref 3.4–5.3)
POTASSIUM BLD-SCNC: 3.6 MMOL/L (ref 3.4–5.3)
POTASSIUM SERPL-SCNC: 3.6 MMOL/L (ref 3.4–5.3)
PROT SERPL-MCNC: 5.8 G/DL (ref 6.8–8.8)
R TIME UNTIL CLOT FORMS: 5.9 MIN (ref 4–9)
R TIME UNTIL CLOT FORMS: 6 MIN (ref 4–9)
R TIME UNTIL CLOT FORMS: 6.3 MIN (ref 4–9)
RBC # BLD AUTO: 3.09 10E12/L (ref 4.4–5.9)
RBC # BLD AUTO: 4.31 10E12/L (ref 4.4–5.9)
SODIUM BLD-SCNC: 141 MMOL/L (ref 133–144)
SODIUM BLD-SCNC: 142 MMOL/L (ref 133–144)
SODIUM BLD-SCNC: 143 MMOL/L (ref 133–144)
SODIUM BLD-SCNC: 143 MMOL/L (ref 133–144)
SODIUM SERPL-SCNC: 141 MMOL/L (ref 133–144)
TRANSFUSION STATUS PATIENT QL: NORMAL
WBC # BLD AUTO: 11.5 10E9/L (ref 4–11)
WBC # BLD AUTO: 31.8 10E9/L (ref 4–11)

## 2020-02-18 PROCEDURE — 40000275 ZZH STATISTIC RCP TIME EA 10 MIN

## 2020-02-18 PROCEDURE — 20000004 ZZH R&B ICU UMMC

## 2020-02-18 PROCEDURE — 85730 THROMBOPLASTIN TIME PARTIAL: CPT | Performed by: INTERNAL MEDICINE

## 2020-02-18 PROCEDURE — 25000128 H RX IP 250 OP 636: Performed by: NURSE ANESTHETIST, CERTIFIED REGISTERED

## 2020-02-18 PROCEDURE — 71045 X-RAY EXAM CHEST 1 VIEW: CPT

## 2020-02-18 PROCEDURE — 25000565 ZZH ISOFLURANE, EA 15 MIN: Performed by: THORACIC SURGERY (CARDIOTHORACIC VASCULAR SURGERY)

## 2020-02-18 PROCEDURE — 27410241 ZZH DEVICE HM II 14-V LITHIUM BATTERY, INITIAL ONLY, EACH

## 2020-02-18 PROCEDURE — 25000128 H RX IP 250 OP 636: Performed by: STUDENT IN AN ORGANIZED HEALTH CARE EDUCATION/TRAINING PROGRAM

## 2020-02-18 PROCEDURE — 99291 CRITICAL CARE FIRST HOUR: CPT | Mod: GC | Performed by: INTERNAL MEDICINE

## 2020-02-18 PROCEDURE — 94799 UNLISTED PULMONARY SVC/PX: CPT

## 2020-02-18 PROCEDURE — 27410239 ZZH DEVICE HM II 14-V LITH BATTERY CLIP BOX, INITIAL ONLY: Mod: 59

## 2020-02-18 PROCEDURE — 25800030 ZZH RX IP 258 OP 636: Performed by: NURSE ANESTHETIST, CERTIFIED REGISTERED

## 2020-02-18 PROCEDURE — 25000125 ZZHC RX 250: Performed by: NURSE ANESTHETIST, CERTIFIED REGISTERED

## 2020-02-18 PROCEDURE — 85730 THROMBOPLASTIN TIME PARTIAL: CPT | Performed by: PHYSICIAN ASSISTANT

## 2020-02-18 PROCEDURE — 27210447 ZZH PACK CELL SAVER CSP: Performed by: THORACIC SURGERY (CARDIOTHORACIC VASCULAR SURGERY)

## 2020-02-18 PROCEDURE — 25000125 ZZHC RX 250: Performed by: THORACIC SURGERY (CARDIOTHORACIC VASCULAR SURGERY)

## 2020-02-18 PROCEDURE — 80053 COMPREHEN METABOLIC PANEL: CPT | Performed by: PHYSICIAN ASSISTANT

## 2020-02-18 PROCEDURE — P9041 ALBUMIN (HUMAN),5%, 50ML: HCPCS

## 2020-02-18 PROCEDURE — 27410276 ZZH DEVICE HM III IMPLANT KIT

## 2020-02-18 PROCEDURE — 37000008 ZZH ANESTHESIA TECHNICAL FEE, 1ST 30 MIN: Performed by: THORACIC SURGERY (CARDIOTHORACIC VASCULAR SURGERY)

## 2020-02-18 PROCEDURE — 02HA0QZ INSERTION OF IMPLANTABLE HEART ASSIST SYSTEM INTO HEART, OPEN APPROACH: ICD-10-PCS | Performed by: THORACIC SURGERY (CARDIOTHORACIC VASCULAR SURGERY)

## 2020-02-18 PROCEDURE — 85610 PROTHROMBIN TIME: CPT | Performed by: INTERNAL MEDICINE

## 2020-02-18 PROCEDURE — 85396 CLOTTING ASSAY WHOLE BLOOD: CPT | Performed by: INTERNAL MEDICINE

## 2020-02-18 PROCEDURE — P9041 ALBUMIN (HUMAN),5%, 50ML: HCPCS | Performed by: STUDENT IN AN ORGANIZED HEALTH CARE EDUCATION/TRAINING PROGRAM

## 2020-02-18 PROCEDURE — P9037 PLATE PHERES LEUKOREDU IRRAD: HCPCS | Performed by: INTERNAL MEDICINE

## 2020-02-18 PROCEDURE — 40000344 ZZHCL STATISTIC THAWING COMPONENT: Performed by: INTERNAL MEDICINE

## 2020-02-18 PROCEDURE — 25800030 ZZH RX IP 258 OP 636: Performed by: STUDENT IN AN ORGANIZED HEALTH CARE EDUCATION/TRAINING PROGRAM

## 2020-02-18 PROCEDURE — 25800030 ZZH RX IP 258 OP 636: Performed by: PHYSICIAN ASSISTANT

## 2020-02-18 PROCEDURE — 40000048 ZZH STATISTIC DAILY SWAN MONITORING

## 2020-02-18 PROCEDURE — 25000128 H RX IP 250 OP 636: Performed by: THORACIC SURGERY (CARDIOTHORACIC VASCULAR SURGERY)

## 2020-02-18 PROCEDURE — 25000128 H RX IP 250 OP 636

## 2020-02-18 PROCEDURE — 25000566 ZZH SEVOFLURANE, EA 15 MIN: Performed by: THORACIC SURGERY (CARDIOTHORACIC VASCULAR SURGERY)

## 2020-02-18 PROCEDURE — 85384 FIBRINOGEN ACTIVITY: CPT | Performed by: INTERNAL MEDICINE

## 2020-02-18 PROCEDURE — 82803 BLOOD GASES ANY COMBINATION: CPT

## 2020-02-18 PROCEDURE — 27410238

## 2020-02-18 PROCEDURE — 25000555 ZZHC RX FACTOR IP 250 OP 636: Performed by: INTERNAL MEDICINE

## 2020-02-18 PROCEDURE — 41000018 ZZH PER-PERFUSION 1ST 30 MIN: Performed by: THORACIC SURGERY (CARDIOTHORACIC VASCULAR SURGERY)

## 2020-02-18 PROCEDURE — C9399 UNCLASSIFIED DRUGS OR BIOLOG: HCPCS

## 2020-02-18 PROCEDURE — 36000074 ZZH SURGERY LEVEL 6 1ST 30 MIN - UMMC: Performed by: THORACIC SURGERY (CARDIOTHORACIC VASCULAR SURGERY)

## 2020-02-18 PROCEDURE — 27410275 ZZH DEVICE HM III CONTROLLER, INITIAL ONLY, EACH

## 2020-02-18 PROCEDURE — 25000128 H RX IP 250 OP 636: Performed by: ANESTHESIOLOGY

## 2020-02-18 PROCEDURE — 37000009 ZZH ANESTHESIA TECHNICAL FEE, EACH ADDTL 15 MIN: Performed by: THORACIC SURGERY (CARDIOTHORACIC VASCULAR SURGERY)

## 2020-02-18 PROCEDURE — 27210794 ZZH OR GENERAL SUPPLY STERILE: Performed by: THORACIC SURGERY (CARDIOTHORACIC VASCULAR SURGERY)

## 2020-02-18 PROCEDURE — 27210460 ZZH PUMP APP ADULT PERFUSION: Performed by: THORACIC SURGERY (CARDIOTHORACIC VASCULAR SURGERY)

## 2020-02-18 PROCEDURE — 5A1945Z RESPIRATORY VENTILATION, 24-96 CONSECUTIVE HOURS: ICD-10-PCS | Performed by: THORACIC SURGERY (CARDIOTHORACIC VASCULAR SURGERY)

## 2020-02-18 PROCEDURE — 85027 COMPLETE CBC AUTOMATED: CPT | Performed by: INTERNAL MEDICINE

## 2020-02-18 PROCEDURE — 82805 BLOOD GASES W/O2 SATURATION: CPT | Performed by: STUDENT IN AN ORGANIZED HEALTH CARE EDUCATION/TRAINING PROGRAM

## 2020-02-18 PROCEDURE — 40000985 XR CHEST PORT 1 VW

## 2020-02-18 PROCEDURE — 88305 TISSUE EXAM BY PATHOLOGIST: CPT | Performed by: THORACIC SURGERY (CARDIOTHORACIC VASCULAR SURGERY)

## 2020-02-18 PROCEDURE — 25000125 ZZHC RX 250

## 2020-02-18 PROCEDURE — 40000014 ZZH STATISTIC ARTERIAL MONITORING DAILY

## 2020-02-18 PROCEDURE — P9016 RBC LEUKOCYTES REDUCED: HCPCS | Performed by: PHYSICIAN ASSISTANT

## 2020-02-18 PROCEDURE — 5A02216 ASSISTANCE WITH CARDIAC OUTPUT USING OTHER PUMP, CONTINUOUS: ICD-10-PCS | Performed by: THORACIC SURGERY (CARDIOTHORACIC VASCULAR SURGERY)

## 2020-02-18 PROCEDURE — 25800030 ZZH RX IP 258 OP 636: Performed by: ANESTHESIOLOGY

## 2020-02-18 PROCEDURE — 27210995 ZZH RX 272: Performed by: THORACIC SURGERY (CARDIOTHORACIC VASCULAR SURGERY)

## 2020-02-18 PROCEDURE — P9059 PLASMA, FRZ BETWEEN 8-24HOUR: HCPCS | Performed by: INTERNAL MEDICINE

## 2020-02-18 PROCEDURE — 83605 ASSAY OF LACTIC ACID: CPT

## 2020-02-18 PROCEDURE — 27410254 ZZH DEVICE HM II UNIVERSAL BATTERY CHARGER, INITIAL ONLY, EACH

## 2020-02-18 PROCEDURE — 36000076 ZZH SURGERY LEVEL 6 EA 15 ADDTL MIN - UMMC: Performed by: THORACIC SURGERY (CARDIOTHORACIC VASCULAR SURGERY)

## 2020-02-18 PROCEDURE — 00000146 ZZHCL STATISTIC GLUCOSE BY METER IP

## 2020-02-18 PROCEDURE — C1768 GRAFT, VASCULAR: HCPCS | Performed by: THORACIC SURGERY (CARDIOTHORACIC VASCULAR SURGERY)

## 2020-02-18 PROCEDURE — P9012 CRYOPRECIPITATE EACH UNIT: HCPCS | Performed by: INTERNAL MEDICINE

## 2020-02-18 PROCEDURE — 84295 ASSAY OF SERUM SODIUM: CPT

## 2020-02-18 PROCEDURE — 25000125 ZZHC RX 250: Performed by: STUDENT IN AN ORGANIZED HEALTH CARE EDUCATION/TRAINING PROGRAM

## 2020-02-18 PROCEDURE — 27410246 ZZH DEVICE HM II POCKET SHOWER BAG, INITIAL ONLY, EACH

## 2020-02-18 PROCEDURE — 83615 LACTATE (LD) (LDH) ENZYME: CPT | Performed by: PHYSICIAN ASSISTANT

## 2020-02-18 PROCEDURE — 41000019 ZZH PERA-PERFUSION EACH ADDTL 15 MIN: Performed by: THORACIC SURGERY (CARDIOTHORACIC VASCULAR SURGERY)

## 2020-02-18 PROCEDURE — 82947 ASSAY GLUCOSE BLOOD QUANT: CPT

## 2020-02-18 PROCEDURE — 40000076 ZZH STATISTIC IABP MONITORING

## 2020-02-18 PROCEDURE — 27410274 ZZH DEVICE HM III POWER UNIT MOBILE W/AC PWR CABLE, INITIAL ONLY, EACH

## 2020-02-18 PROCEDURE — 83735 ASSAY OF MAGNESIUM: CPT | Performed by: PHYSICIAN ASSISTANT

## 2020-02-18 PROCEDURE — 82330 ASSAY OF CALCIUM: CPT

## 2020-02-18 PROCEDURE — 40000196 ZZH STATISTIC RAPCV CVP MONITORING

## 2020-02-18 PROCEDURE — 85027 COMPLETE CBC AUTOMATED: CPT | Performed by: PHYSICIAN ASSISTANT

## 2020-02-18 PROCEDURE — 25000128 H RX IP 250 OP 636: Performed by: PHYSICIAN ASSISTANT

## 2020-02-18 PROCEDURE — 84132 ASSAY OF SERUM POTASSIUM: CPT

## 2020-02-18 PROCEDURE — 25800030 ZZH RX IP 258 OP 636: Performed by: THORACIC SURGERY (CARDIOTHORACIC VASCULAR SURGERY)

## 2020-02-18 PROCEDURE — 84100 ASSAY OF PHOSPHORUS: CPT | Performed by: PHYSICIAN ASSISTANT

## 2020-02-18 DEVICE — GRAFT GORTEX 18MMX40CM STRETCH SA1804: Type: IMPLANTABLE DEVICE | Site: HEART | Status: FUNCTIONAL

## 2020-02-18 RX ORDER — FLUCONAZOLE 2 MG/ML
200 INJECTION, SOLUTION INTRAVENOUS EVERY 24 HOURS
Status: COMPLETED | OUTPATIENT
Start: 2020-02-19 | End: 2020-02-20

## 2020-02-18 RX ORDER — GLYCOPYRROLATE 0.2 MG/ML
INJECTION, SOLUTION INTRAMUSCULAR; INTRAVENOUS PRN
Status: DISCONTINUED | OUTPATIENT
Start: 2020-02-18 | End: 2020-02-19

## 2020-02-18 RX ORDER — MAGNESIUM SULFATE HEPTAHYDRATE 40 MG/ML
4 INJECTION, SOLUTION INTRAVENOUS EVERY 4 HOURS PRN
Status: DISCONTINUED | OUTPATIENT
Start: 2020-02-18 | End: 2020-03-20 | Stop reason: HOSPADM

## 2020-02-18 RX ORDER — POTASSIUM CHLORIDE 29.8 MG/ML
20 INJECTION INTRAVENOUS
Status: DISCONTINUED | OUTPATIENT
Start: 2020-02-18 | End: 2020-03-20 | Stop reason: HOSPADM

## 2020-02-18 RX ORDER — FENTANYL CITRATE 50 UG/ML
INJECTION, SOLUTION INTRAMUSCULAR; INTRAVENOUS PRN
Status: DISCONTINUED | OUTPATIENT
Start: 2020-02-18 | End: 2020-02-19

## 2020-02-18 RX ORDER — NALOXONE HYDROCHLORIDE 0.4 MG/ML
.1-.4 INJECTION, SOLUTION INTRAMUSCULAR; INTRAVENOUS; SUBCUTANEOUS
Status: DISCONTINUED | OUTPATIENT
Start: 2020-02-18 | End: 2020-03-20 | Stop reason: HOSPADM

## 2020-02-18 RX ORDER — LEVOFLOXACIN 5 MG/ML
500 INJECTION, SOLUTION INTRAVENOUS SEE ADMIN INSTRUCTIONS
Status: DISCONTINUED | OUTPATIENT
Start: 2020-02-18 | End: 2020-02-18 | Stop reason: HOSPADM

## 2020-02-18 RX ORDER — PROPOFOL 10 MG/ML
INJECTION, EMULSION INTRAVENOUS PRN
Status: DISCONTINUED | OUTPATIENT
Start: 2020-02-18 | End: 2020-02-19

## 2020-02-18 RX ORDER — MUPIROCIN 20 MG/G
1 OINTMENT TOPICAL 2 TIMES DAILY
Status: COMPLETED | OUTPATIENT
Start: 2020-02-19 | End: 2020-02-22

## 2020-02-18 RX ORDER — HYDRALAZINE HYDROCHLORIDE 20 MG/ML
10 INJECTION INTRAMUSCULAR; INTRAVENOUS EVERY 30 MIN PRN
Status: DISCONTINUED | OUTPATIENT
Start: 2020-02-18 | End: 2020-02-23

## 2020-02-18 RX ORDER — DEXMEDETOMIDINE HYDROCHLORIDE 4 UG/ML
0.2-1.2 INJECTION, SOLUTION INTRAVENOUS CONTINUOUS
Status: CANCELLED | OUTPATIENT
Start: 2020-02-18

## 2020-02-18 RX ORDER — MAGNESIUM SULFATE HEPTAHYDRATE 40 MG/ML
2 INJECTION, SOLUTION INTRAVENOUS DAILY PRN
Status: DISCONTINUED | OUTPATIENT
Start: 2020-02-18 | End: 2020-03-20 | Stop reason: HOSPADM

## 2020-02-18 RX ORDER — PROTAMINE SULFATE 10 MG/ML
INJECTION, SOLUTION INTRAVENOUS PRN
Status: DISCONTINUED | OUTPATIENT
Start: 2020-02-18 | End: 2020-02-19

## 2020-02-18 RX ORDER — SODIUM CHLORIDE, SODIUM GLUCONATE, SODIUM ACETATE, POTASSIUM CHLORIDE AND MAGNESIUM CHLORIDE 526; 502; 368; 37; 30 MG/100ML; MG/100ML; MG/100ML; MG/100ML; MG/100ML
INJECTION, SOLUTION INTRAVENOUS CONTINUOUS PRN
Status: DISCONTINUED | OUTPATIENT
Start: 2020-02-18 | End: 2020-02-19

## 2020-02-18 RX ORDER — POTASSIUM CHLORIDE 29.8 MG/ML
20 INJECTION INTRAVENOUS ONCE
Status: COMPLETED | OUTPATIENT
Start: 2020-02-18 | End: 2020-02-18

## 2020-02-18 RX ORDER — ALBUMIN HUMAN 5 %
INTRAVENOUS SOLUTION INTRAVENOUS PRN
Status: DISCONTINUED | OUTPATIENT
Start: 2020-02-18 | End: 2020-02-19

## 2020-02-18 RX ORDER — NALOXONE HYDROCHLORIDE 0.4 MG/ML
.1-.4 INJECTION, SOLUTION INTRAMUSCULAR; INTRAVENOUS; SUBCUTANEOUS
Status: DISCONTINUED | OUTPATIENT
Start: 2020-02-18 | End: 2020-02-19

## 2020-02-18 RX ORDER — PROPOFOL 10 MG/ML
10-20 INJECTION, EMULSION INTRAVENOUS EVERY 30 MIN PRN
Status: DISCONTINUED | OUTPATIENT
Start: 2020-02-18 | End: 2020-02-21

## 2020-02-18 RX ORDER — MEPERIDINE HYDROCHLORIDE 25 MG/ML
12.5-25 INJECTION INTRAMUSCULAR; INTRAVENOUS; SUBCUTANEOUS
Status: DISCONTINUED | OUTPATIENT
Start: 2020-02-18 | End: 2020-02-24

## 2020-02-18 RX ORDER — POTASSIUM CHLORIDE 750 MG/1
20-40 TABLET, EXTENDED RELEASE ORAL
Status: DISCONTINUED | OUTPATIENT
Start: 2020-02-18 | End: 2020-03-20 | Stop reason: HOSPADM

## 2020-02-18 RX ORDER — MAGNESIUM HYDROXIDE 1200 MG/15ML
LIQUID ORAL PRN
Status: DISCONTINUED | OUTPATIENT
Start: 2020-02-18 | End: 2020-02-18 | Stop reason: HOSPADM

## 2020-02-18 RX ORDER — DEXMEDETOMIDINE HYDROCHLORIDE 4 UG/ML
0.2-1.2 INJECTION, SOLUTION INTRAVENOUS CONTINUOUS
Status: DISCONTINUED | OUTPATIENT
Start: 2020-02-18 | End: 2020-02-18 | Stop reason: HOSPADM

## 2020-02-18 RX ORDER — LEVOFLOXACIN 5 MG/ML
500 INJECTION, SOLUTION INTRAVENOUS
Status: COMPLETED | OUTPATIENT
Start: 2020-02-18 | End: 2020-02-18

## 2020-02-18 RX ORDER — ALBUMIN, HUMAN INJ 5% 5 %
500-1000 SOLUTION INTRAVENOUS
Status: COMPLETED | OUTPATIENT
Start: 2020-02-18 | End: 2020-02-19

## 2020-02-18 RX ORDER — DOBUTAMINE HYDROCHLORIDE 200 MG/100ML
2.5-2 INJECTION INTRAVENOUS CONTINUOUS
Status: DISCONTINUED | OUTPATIENT
Start: 2020-02-19 | End: 2020-02-19

## 2020-02-18 RX ORDER — LIDOCAINE 40 MG/G
CREAM TOPICAL
Status: DISCONTINUED | OUTPATIENT
Start: 2020-02-18 | End: 2020-03-05

## 2020-02-18 RX ORDER — CALCIUM CHLORIDE 100 MG/ML
INJECTION INTRAVENOUS; INTRAVENTRICULAR PRN
Status: DISCONTINUED | OUTPATIENT
Start: 2020-02-18 | End: 2020-02-19

## 2020-02-18 RX ORDER — POTASSIUM CL/LIDO/0.9 % NACL 10MEQ/0.1L
10 INTRAVENOUS SOLUTION, PIGGYBACK (ML) INTRAVENOUS
Status: DISCONTINUED | OUTPATIENT
Start: 2020-02-18 | End: 2020-03-20 | Stop reason: HOSPADM

## 2020-02-18 RX ORDER — FLUCONAZOLE 2 MG/ML
200 INJECTION, SOLUTION INTRAVENOUS
Status: COMPLETED | OUTPATIENT
Start: 2020-02-18 | End: 2020-02-18

## 2020-02-18 RX ORDER — HEPARIN SODIUM 1000 [USP'U]/ML
INJECTION, SOLUTION INTRAVENOUS; SUBCUTANEOUS PRN
Status: DISCONTINUED | OUTPATIENT
Start: 2020-02-18 | End: 2020-02-19

## 2020-02-18 RX ORDER — PROPOFOL 10 MG/ML
INJECTION, EMULSION INTRAVENOUS CONTINUOUS PRN
Status: DISCONTINUED | OUTPATIENT
Start: 2020-02-18 | End: 2020-02-19

## 2020-02-18 RX ORDER — POTASSIUM CHLORIDE 1.5 G/1.58G
20-40 POWDER, FOR SOLUTION ORAL
Status: DISCONTINUED | OUTPATIENT
Start: 2020-02-18 | End: 2020-03-20 | Stop reason: HOSPADM

## 2020-02-18 RX ORDER — NOREPINEPHRINE BITARTRATE 0.06 MG/ML
0.03-0.4 INJECTION, SOLUTION INTRAVENOUS CONTINUOUS
Status: CANCELLED | OUTPATIENT
Start: 2020-02-18

## 2020-02-18 RX ORDER — NOREPINEPHRINE BITARTRATE 0.06 MG/ML
.03-.4 INJECTION, SOLUTION INTRAVENOUS CONTINUOUS
Status: DISCONTINUED | OUTPATIENT
Start: 2020-02-18 | End: 2020-02-18 | Stop reason: HOSPADM

## 2020-02-18 RX ORDER — PROPOFOL 10 MG/ML
5-75 INJECTION, EMULSION INTRAVENOUS CONTINUOUS
Status: DISCONTINUED | OUTPATIENT
Start: 2020-02-18 | End: 2020-02-21

## 2020-02-18 RX ORDER — POTASSIUM CHLORIDE 7.45 MG/ML
10 INJECTION INTRAVENOUS
Status: DISCONTINUED | OUTPATIENT
Start: 2020-02-18 | End: 2020-03-20 | Stop reason: HOSPADM

## 2020-02-18 RX ORDER — NOREPINEPHRINE BITARTRATE 0.06 MG/ML
0.03-0.4 INJECTION, SOLUTION INTRAVENOUS CONTINUOUS
Status: DISCONTINUED | OUTPATIENT
Start: 2020-02-18 | End: 2020-02-21

## 2020-02-18 RX ORDER — LEVOFLOXACIN 5 MG/ML
500 INJECTION, SOLUTION INTRAVENOUS EVERY 24 HOURS
Status: COMPLETED | OUTPATIENT
Start: 2020-02-19 | End: 2020-02-20

## 2020-02-18 RX ADMIN — FENTANYL CITRATE 100 MCG: 50 INJECTION, SOLUTION INTRAMUSCULAR; INTRAVENOUS at 19:01

## 2020-02-18 RX ADMIN — LEVOFLOXACIN 500 MG: 5 INJECTION, SOLUTION INTRAVENOUS at 17:40

## 2020-02-18 RX ADMIN — HEPARIN SODIUM 34000 UNITS: 1000 INJECTION, SOLUTION INTRAVENOUS; SUBCUTANEOUS at 18:02

## 2020-02-18 RX ADMIN — DICLOFENAC 4 G: 10 GEL TOPICAL at 12:26

## 2020-02-18 RX ADMIN — PROPOFOL 30 MCG/KG/MIN: 10 INJECTION, EMULSION INTRAVENOUS at 21:13

## 2020-02-18 RX ADMIN — PROTAMINE SULFATE 140 MG: 10 INJECTION, SOLUTION INTRAVENOUS at 19:31

## 2020-02-18 RX ADMIN — DICLOFENAC 4 G: 10 GEL TOPICAL at 07:56

## 2020-02-18 RX ADMIN — ALBUMIN (HUMAN) 500 ML: 12.5 SOLUTION INTRAVENOUS at 20:44

## 2020-02-18 RX ADMIN — EPINEPHRINE 5 MCG: 1 INJECTION PARENTERAL at 18:15

## 2020-02-18 RX ADMIN — COAGULATION FACTOR VIIA (RECOMBINANT) 3 MG: KIT at 23:03

## 2020-02-18 RX ADMIN — SUGAMMADEX 200 MG: 100 INJECTION, SOLUTION INTRAVENOUS at 23:44

## 2020-02-18 RX ADMIN — SODIUM CHLORIDE 600 MG: 9 INJECTION, SOLUTION INTRAVENOUS at 17:30

## 2020-02-18 RX ADMIN — ROCURONIUM BROMIDE 20 MG: 10 INJECTION INTRAVENOUS at 19:43

## 2020-02-18 RX ADMIN — FLUCONAZOLE 200 MG: 2 INJECTION, SOLUTION INTRAVENOUS at 17:50

## 2020-02-18 RX ADMIN — Medication 0.5 UNITS: at 21:24

## 2020-02-18 RX ADMIN — SODIUM CHLORIDE, POTASSIUM CHLORIDE, SODIUM LACTATE AND CALCIUM CHLORIDE: 600; 310; 30; 20 INJECTION, SOLUTION INTRAVENOUS at 22:34

## 2020-02-18 RX ADMIN — CEFTRIAXONE 2 G: 2 INJECTION, POWDER, FOR SOLUTION INTRAMUSCULAR; INTRAVENOUS at 10:31

## 2020-02-18 RX ADMIN — ROCURONIUM BROMIDE 100 MG: 10 INJECTION INTRAVENOUS at 16:56

## 2020-02-18 RX ADMIN — POTASSIUM CHLORIDE 20 MEQ: 29.8 INJECTION, SOLUTION INTRAVENOUS at 08:01

## 2020-02-18 RX ADMIN — HUMAN INSULIN 3.5 UNITS/HR: 100 INJECTION, SOLUTION SUBCUTANEOUS at 19:55

## 2020-02-18 RX ADMIN — DEXMEDETOMIDINE 0.5 MCG/KG/HR: 100 INJECTION, SOLUTION, CONCENTRATE INTRAVENOUS at 18:00

## 2020-02-18 RX ADMIN — CALCIUM CHLORIDE 1 G: 100 INJECTION, SOLUTION INTRAVENOUS at 22:44

## 2020-02-18 RX ADMIN — ROCURONIUM BROMIDE 20 MG: 10 INJECTION INTRAVENOUS at 22:22

## 2020-02-18 RX ADMIN — PROTAMINE SULFATE 10 MG: 10 INJECTION, SOLUTION INTRAVENOUS at 19:22

## 2020-02-18 RX ADMIN — MUPIROCIN: 20 OINTMENT TOPICAL at 07:56

## 2020-02-18 RX ADMIN — AMPICILLIN SODIUM 2 G: 2 INJECTION, POWDER, FOR SOLUTION INTRAMUSCULAR; INTRAVENOUS at 04:07

## 2020-02-18 RX ADMIN — SODIUM CHLORIDE 1.25 G/HR: 900 INJECTION, SOLUTION INTRAVENOUS at 18:29

## 2020-02-18 RX ADMIN — SODIUM CHLORIDE, SODIUM GLUCONATE, SODIUM ACETATE, POTASSIUM CHLORIDE AND MAGNESIUM CHLORIDE: 526; 502; 368; 37; 30 INJECTION, SOLUTION INTRAVENOUS at 17:30

## 2020-02-18 RX ADMIN — DESMOPRESSIN ACETATE 32.68 MCG: 4 SOLUTION INTRAVENOUS at 22:45

## 2020-02-18 RX ADMIN — FENTANYL CITRATE 100 MCG: 50 INJECTION, SOLUTION INTRAMUSCULAR; INTRAVENOUS at 17:54

## 2020-02-18 RX ADMIN — PROTAMINE SULFATE 100 MG: 10 INJECTION, SOLUTION INTRAVENOUS at 19:27

## 2020-02-18 RX ADMIN — FENTANYL CITRATE 250 MCG: 50 INJECTION, SOLUTION INTRAMUSCULAR; INTRAVENOUS at 16:55

## 2020-02-18 RX ADMIN — VANCOMYCIN HYDROCHLORIDE 1500 MG: 10 INJECTION, POWDER, LYOPHILIZED, FOR SOLUTION INTRAVENOUS at 17:15

## 2020-02-18 RX ADMIN — AMPICILLIN SODIUM 2 G: 2 INJECTION, POWDER, FOR SOLUTION INTRAMUSCULAR; INTRAVENOUS at 10:11

## 2020-02-18 RX ADMIN — BUMETANIDE 2 MG: 0.25 INJECTION INTRAMUSCULAR; INTRAVENOUS at 00:06

## 2020-02-18 RX ADMIN — Medication 0.05 MCG/KG/MIN: at 18:24

## 2020-02-18 RX ADMIN — EPINEPHRINE 0.03 MCG/KG/MIN: 1 INJECTION PARENTERAL at 18:43

## 2020-02-18 RX ADMIN — PROPOFOL 50 MG: 10 INJECTION, EMULSION INTRAVENOUS at 16:56

## 2020-02-18 RX ADMIN — Medication: at 12:23

## 2020-02-18 RX ADMIN — MIDAZOLAM 1 MG: 1 INJECTION INTRAMUSCULAR; INTRAVENOUS at 16:51

## 2020-02-18 RX ADMIN — EPINEPHRINE 5 MCG: 1 INJECTION PARENTERAL at 16:56

## 2020-02-18 RX ADMIN — GLYCOPYRROLATE 0.2 MG: 0.2 INJECTION, SOLUTION INTRAMUSCULAR; INTRAVENOUS at 19:03

## 2020-02-18 RX ADMIN — DOBUTAMINE 7.5 MCG/KG/MIN: 12.5 INJECTION, SOLUTION INTRAVENOUS at 12:43

## 2020-02-18 RX ADMIN — VASOPRESSIN 1 UNITS/HR: 20 INJECTION INTRAVENOUS at 19:07

## 2020-02-18 RX ADMIN — SODIUM CHLORIDE, SODIUM GLUCONATE, SODIUM ACETATE, POTASSIUM CHLORIDE AND MAGNESIUM CHLORIDE: 526; 502; 368; 37; 30 INJECTION, SOLUTION INTRAVENOUS at 16:50

## 2020-02-18 RX ADMIN — SODIUM CHLORIDE 7.5 G/HR: 900 INJECTION, SOLUTION INTRAVENOUS at 17:30

## 2020-02-18 RX ADMIN — EPINEPHRINE 0.08 MCG/KG/MIN: 1 INJECTION PARENTERAL at 18:46

## 2020-02-18 ASSESSMENT — ENCOUNTER SYMPTOMS
DYSRHYTHMIAS: 1
ORTHOPNEA: 1

## 2020-02-18 ASSESSMENT — ACTIVITIES OF DAILY LIVING (ADL)
ADLS_ACUITY_SCORE: 16

## 2020-02-18 ASSESSMENT — MIFFLIN-ST. JEOR: SCORE: 1700.63

## 2020-02-18 ASSESSMENT — NEW YORK HEART ASSOCIATION (NYHA) CLASSIFICATION: NYHA FUNCTIONAL CLASS: IV

## 2020-02-18 NOTE — PROGRESS NOTES
Cardiology Progress Note  Eliseo Tanner MRN: [id number]  Age: 64 year old, : 1955  Date: 2020              Assessment and Plan:     Mr Eliseo Tanner is a 63yo M w/PMHx notable for chronic systolic heart failure, NICM, moderate CAD, HTN, ABHINAV on CPAP, DM2, CKDIII who is transferred to [facility] for evaluation and management of advanced heart failure with arrhythmia.     Today's plan ()  -Plan for LVAD on  PM  -Keep dobutamine at 7.5  -No diuresis today    Moderate CAD  Severe Mitral regurgitation  Chronic nonischemic systolic heart failure w/ reduced EF (15-20%) and exacerbation  NYHA Class III. Echo 2020: LVEF 15-20%; LVEDd 5.9 cm; 4+ MR. [facility] 2019 w/ nonobstructive CAD w/ severe branch disease. Presented with increased wt gain, SOB, LE edema concerning for HF exacerbation, initially concerning for cardiogenic shock. Admitted to [facility] for further evaluation regarding need for advanced HF therapies. Overall, impression is that once patient is the most compensated on  with clearing Bcx and improving renal function for LVAD surgery. While we could wait for OHT, patient may lose candidacy for any advanced therapy option. Overall, patient's infection is controlled, renal function has improved and heparin antibodies are cleared. This represents the best possible time for LVAD surgery in his recent ICU stay.  -[place] placed: hemodynamics w/ Jane CO/CI q6h  -Telemetry  -Lactic acid, VBG ordered; trend  -Beta-blocker: None due to decompensated HF  -ACEi/ARB/ARNI: Holding home Entresto  mg BID  -Aldosterone antagonist: Holding spironolactone until renal assessed   -Volume status: Hypervolemic on exam. Weight on admit 240. Baseline weight 225-230.  -Diuretic regimen: holding today, given adequate UOP off diuretics  -Keep dobutamine at 7.5  -Afterload reduction: holding in setting of being on dopmaine  -Continue ASA 81, atorvastatin 20 mg    Proteus and Enterococcus  bacteremia  Organisms growing from 2/2 blood cultures from 2/13. Blood cultured from 2/16 NGTD and 2/15 growing 1/2 S.epi, likely contaminant per ID.  ID consulted, appreciate help.  -daily blood cultures until negative  -Zosyn (2/13-2/14)  -Tobramycin (2/13-2/14)  -Vancomycin (2/13-2/17  -Meropenem (2/14-2/15)  -Ceftriaxone (2/16-  -Ampicillin (2/16-    BERNARDA on CKDIII  Cr on admission 1.7, baseline Cr 1.5-2. Goal to improve renal function with diuresis in order to make best candidate for potential LVAD.  -Trend Cr  -Daily fluid balance  -Diuresis as above    Precapillary pulmonary hypertension:  Significant transpulmonary gradient of 14 on right heart cath numbers. May be related to CTEPH vs. WHO I.    #Thrombocytopenia:  Concern for HIT given timing with respect to heparin exposure. HIT positive DAVINA negative. Antibody is now negative x2, thus no further indication for PLAX  -Heme consulted  -Bivalirudin gtt after LVAD surgery    VFib requiring shock  Shocked x 3 prior to transfer, loaded with amio. No known prior history. Suspect related to underlying HF.   -Keep K>4, Mg>2  -Amio 400 mg PO BID  -Need ICD for secondary prevention     Diabetes Mellitus Type II  Last A1c 7.2% 2/6/20  Home regimen includes: 15U basaglar at bedtime, glipizide 2.5  -Will resume home glargine at 30% reduction, 10U QHS  -Holding home oral regimen while renal function and/or hemodynamic status is either impaired or threatened  -Preprandial blood glucose target <140 mg/dL and random <180mg/dl, or 140-180 mg/dL for critically ill  -SSI insulin, q4h blood sugar checks, hypoglycemia protocol ordered     Chronic:  ABHINAV: Home CPAP  Gout: PTA allopurinol 200 daily  MDD: PTA fluoxetine  GERD: PTA PPI  COPD: albuterol PRN    FEN:  2gm Na   2L water restriction    PPX: Bivalirudin    CODE: FULL CODE     Stanford Acuna M.D.  Cardiology fellow             Subjective/Interval Events     Overnight, no acute events. This AM, patient denies any SOB, CP,  "lightheadedness, dizziness.          Objective     BP (!) 152/54   Pulse 66   Temp 98.2  F (36.8  C) (Axillary)   Resp 20   Ht 1.702 m (5' 7\")   Wt 96.2 kg (212 lb 1.3 oz)   SpO2 95%   BMI 33.22 kg/m    Temp:  [97.9  F (36.6  C)-98.3  F (36.8  C)] 98.2  F (36.8  C)  Heart Rate:  [68-89] 82  Resp:  [8-28] 20  MAP:  [74 mmHg-105 mmHg] 97 mmHg  Arterial Line BP: (135-177)/(24-48) 164/43  SpO2:  [93 %-100 %] 95 %  Wt Readings from Last 2 Encounters:   02/18/20 96.2 kg (212 lb 1.3 oz)     I/O last 3 completed shifts:  In: 1741.05 [P.O.:450; I.V.:1291.05]  Out: 4075 [Urine:4075]      Gen: No acute distress  HEENT: NC/AT, PERRL, EOM intact, MMM, OP without exudates  PULM/THORAX: Clear to auscultation bilaterally, no rales/rhonchi/wheezes  CV: normal rate, regular rhythm, normal S1 and S2, no murmurs or rubs.  ABD: Soft, NTND, bowel sounds present, no masses  EXT: WWP. No LE edema, clubbing or cyanosis.  NEURO: CN II-XII grossly intact. A&Ox3    Lines:  -R IJ PA catheter  -Radial arterial line  -L femoral arterial IABP            Data:     Recent Results (from the past 24 hour(s))   Blood gas venous with oxyhemoglobin (PCU Collect TBD)    Collection Time: 02/17/20 12:40 PM   Result Value Ref Range    Ph Venous 7.47 (H) 7.32 - 7.43 pH    PCO2 Venous 50 40 - 50 mm Hg    PO2 Venous 32 25 - 47 mm Hg    Bicarbonate Venous 36 (H) 21 - 28 mmol/L    FIO2 21     Oxyhemoglobin Venous 58 %    Base Excess Venous 10.9 mmol/L   Basic metabolic panel    Collection Time: 02/17/20  4:09 PM   Result Value Ref Range    Sodium 142 133 - 144 mmol/L    Potassium 3.7 3.4 - 5.3 mmol/L    Chloride 102 94 - 109 mmol/L    Carbon Dioxide 35 (H) 20 - 32 mmol/L    Anion Gap 6 3 - 14 mmol/L    Glucose 153 (H) 70 - 99 mg/dL    Urea Nitrogen 35 (H) 7 - 30 mg/dL    Creatinine 1.45 (H) 0.66 - 1.25 mg/dL    GFR Estimate 50 (L) >60 mL/min/[1.73_m2]    GFR Estimate If Black 57 (L) >60 mL/min/[1.73_m2]    Calcium 9.3 8.5 - 10.1 mg/dL   Blood gas venous " with oxyhemoglobin (PCU Collect TBD)    Collection Time: 02/17/20  4:09 PM   Result Value Ref Range    Ph Venous 7.46 (H) 7.32 - 7.43 pH    PCO2 Venous 51 (H) 40 - 50 mm Hg    PO2 Venous 36 25 - 47 mm Hg    Bicarbonate Venous 36 (H) 21 - 28 mmol/L    FIO2 21.0     Oxyhemoglobin Venous 66 %    Base Excess Venous 10.5 mmol/L   ABO/Rh type and screen    Collection Time: 02/17/20  4:30 PM   Result Value Ref Range    Units Ordered 4     ABO A     RH(D) Pos     Antibody Screen Neg     Test Valid Only At          [facility]    Specimen Expires 02/20/2020     Crossmatch Red Blood Cells    Blood component    Collection Time: 02/17/20  4:30 PM   Result Value Ref Range    Unit Number [id number]     Blood Component Type Red Blood Cells Leukocyte Reduced     Division Number 00     Status of Unit Ready for patient 02/17/2020 1937     Blood Product Code O7091N26     Unit Status JOSÉ MIGUEL    Blood component    Collection Time: 02/17/20  4:30 PM   Result Value Ref Range    Unit Number [id number]     Blood Component Type Red Blood Cells Leukocyte Reduced     Division Number 00     Status of Unit Ready for patient 02/17/2020 1937     Blood Product Code S6887N99     Unit Status JOSÉ MIGUEL    Blood component    Collection Time: 02/17/20  4:30 PM   Result Value Ref Range    Unit Number [id number]     Blood Component Type Red Blood Cells Leukocyte Reduced     Division Number 00     Status of Unit Ready for patient 02/17/2020 1937     Blood Product Code G1343A35     Unit Status JOSÉ MIGUEL    Blood component    Collection Time: 02/17/20  4:30 PM   Result Value Ref Range    Unit Number [id number]     Blood Component Type Red Blood Cells Leukocyte Reduced     Division Number 00     Status of Unit Ready for patient 02/17/2020 1937     Blood Product Code U6964O82     Unit Status JOSÉ MIGUEL    Glucose by meter    Collection Time: 02/17/20  5:25 PM   Result Value Ref Range    Glucose 142 (H) 70 - 99 mg/dL   Blood  gas venous with oxyhemoglobin (PCU Collect TBD)    Collection Time: 02/17/20  8:53 PM   Result Value Ref Range    Ph Venous 7.46 (H) 7.32 - 7.43 pH    PCO2 Venous 51 (H) 40 - 50 mm Hg    PO2 Venous 38 25 - 47 mm Hg    Bicarbonate Venous 36 (H) 21 - 28 mmol/L    FIO2 3L     Oxyhemoglobin Venous 69 %    Base Excess Venous 10.4 mmol/L   Glucose by meter    Collection Time: 02/17/20  9:35 PM   Result Value Ref Range    Glucose 147 (H) 70 - 99 mg/dL   Blood gas venous with oxyhemoglobin (PCU Collect TBD)    Collection Time: 02/18/20 12:54 AM   Result Value Ref Range    Ph Venous 7.48 (H) 7.32 - 7.43 pH    PCO2 Venous 50 40 - 50 mm Hg    PO2 Venous 36 25 - 47 mm Hg    Bicarbonate Venous 37 (H) 21 - 28 mmol/L    FIO2 3L     Oxyhemoglobin Venous 66 %    Base Excess Venous 11.4 mmol/L   Blood gas venous with oxyhemoglobin (PCU Collect TBD)    Collection Time: 02/18/20  3:53 AM   Result Value Ref Range    Ph Venous 7.47 (H) 7.32 - 7.43 pH    PCO2 Venous 53 (H) 40 - 50 mm Hg    PO2 Venous 38 25 - 47 mm Hg    Bicarbonate Venous 39 (H) 21 - 28 mmol/L    FIO2 3L     Oxyhemoglobin Venous 69 %    Base Excess Venous 12.8 mmol/L   CBC with platelets    Collection Time: 02/18/20  4:14 AM   Result Value Ref Range    WBC 11.5 (H) 4.0 - 11.0 10e9/L    RBC Count 4.31 (L) 4.4 - 5.9 10e12/L    Hemoglobin 12.1 (L) 13.3 - 17.7 g/dL    Hematocrit 37.6 (L) 40.0 - 53.0 %    MCV 87 78 - 100 fl    MCH 28.1 26.5 - 33.0 pg    MCHC 32.2 31.5 - 36.5 g/dL    RDW 18.0 (H) 10.0 - 15.0 %    Platelet Count 199 150 - 450 10e9/L   Partial thromboplastin time    Collection Time: 02/18/20  4:14 AM   Result Value Ref Range    PTT 46 (H) 22 - 37 sec   Lactate Dehydrogenase    Collection Time: 02/18/20  4:14 AM   Result Value Ref Range    Lactate Dehydrogenase 294 (H) 85 - 227 U/L   Comprehensive metabolic panel    Collection Time: 02/18/20  4:14 AM   Result Value Ref Range    Sodium 141 133 - 144 mmol/L    Potassium 3.6 3.4 - 5.3 mmol/L    Chloride 101 94 - 109  mmol/L    Carbon Dioxide 36 (H) 20 - 32 mmol/L    Anion Gap 3 3 - 14 mmol/L    Glucose 112 (H) 70 - 99 mg/dL    Urea Nitrogen 39 (H) 7 - 30 mg/dL    Creatinine 1.48 (H) 0.66 - 1.25 mg/dL    GFR Estimate 48 (L) >60 mL/min/[1.73_m2]    GFR Estimate If Black 56 (L) >60 mL/min/[1.73_m2]    Calcium 9.1 8.5 - 10.1 mg/dL    Bilirubin Total 0.4 0.2 - 1.3 mg/dL    Albumin 2.8 (L) 3.4 - 5.0 g/dL    Protein Total 5.8 (L) 6.8 - 8.8 g/dL    Alkaline Phosphatase 83 40 - 150 U/L    ALT 24 0 - 70 U/L    AST 31 0 - 45 U/L   Magnesium    Collection Time: 20  4:14 AM   Result Value Ref Range    Magnesium 1.7 1.6 - 2.3 mg/dL   Phosphorus    Collection Time: 20  4:14 AM   Result Value Ref Range    Phosphorus 4.2 2.5 - 4.5 mg/dL   Blood gas venous with oxyhemoglobin (PCU Collect TBD)    Collection Time: 20  7:45 AM   Result Value Ref Range    Ph Venous 7.47 (H) 7.32 - 7.43 pH    PCO2 Venous 35 (L) 40 - 50 mm Hg    PO2 Venous 42 25 - 47 mm Hg    Bicarbonate Venous 25 21 - 28 mmol/L    FIO2 3L     Oxyhemoglobin Venous 75 %    Base Excess Venous 1.4 mmol/L   Glucose by meter    Collection Time: 20  7:55 AM   Result Value Ref Range    Glucose 114 (H) 70 - 99 mg/dL       Recent Results (from the past 24 hour(s))   Echocardiogram Complete    Narrative    [id number]  [id number]  [id number]  [id number]^NAHUN^JIM           [facility]  Echocardiography Laboratory  [address] [place] [id number]     Name: WOLFGANG LEACH  MRN: [id number]  : 1955  Study Date: 2020 10:06 AM  Age: 64 yrs  Gender: Male  Patient [facility]  Reason For Study: Heart Failure  Ordering Physician: NAHUN GUTIERREZ  Referring Physician: SELF, REFERRED  Performed By: Yvonne Adan RDCS     BSA: 2.2 m2  Height: 67 in  Weight: 244 lb  HR: 103  BP: 72/52 mmHg  _____________________________________________________________________________  __        Procedure  Complete Portable Echo Adult.  Contrast Optison. Optison (NDC #9618-7980-52)  given intravenously. Patient was given 6 ml mixture of 3 ml Optison and 6 ml  saline. 3 ml wasted.  _____________________________________________________________________________  __        Interpretation Summary  Left ventricular size is normal.  LVEF 20% based on biplane 2D tracing.  Mild to moderate right ventricular dilation is present.  Global right ventricular function is moderately reduced.  Pulmonary artery systolic pressure is normal.  Dilation of the inferior vena cava is present with abnormal respiratory  variation in diameter.  _____________________________________________________________________________  __        Left Ventricle  Left ventricular size is normal. LVEF 20% based on biplane 2D tracing. Left  ventricular wall thickness is normal. Diastolic function not assessed due to  frequent ectopy. Abnormal septal motion consistent with left bundle branch  block is present.     Right Ventricle  Mild to moderate right ventricular dilation is present. Global right  ventricular function is moderately reduced.     Atria  Mild biatrial enlargement is present.     Mitral Valve  Moderate mitral insufficiency is present.        Aortic Valve  Aortic valve is normal in structure and function.     Tricuspid Valve  Moderate tricuspid insufficiency is present. The right ventricular systolic  pressure is approximated at 24.4 mmHg plus the right atrial pressure.  Pulmonary artery systolic pressure is normal.     Pulmonic Valve  The pulmonic valve cannot be assessed. Mild pulmonic insufficiency is present.     Vessels  The aorta root is normal. The pulmonary artery cannot be assessed. Dilation of  the inferior vena cava is present with abnormal respiratory variation in  diameter.     Compared to Previous Study  Previous study not available for comparison.     _____________________________________________________________________________  __  MMode/2D Measurements &  Calculations  IVSd: 0.61 cm  LVIDd: 4.7 cm  LVIDs: 3.7 cm  LVPWd: 0.75 cm  FS: 21.7 %  LV mass(C)d: 99.1 grams  LV mass(C)dI: 45.0 grams/m2     Ao root diam: 2.9 cm  asc Aorta Diam: 2.5 cm  LVOT diam: 1.9 cm  LVOT area: 2.8 cm2     EF(MOD-bp): 21.5 %  LA Volume (BP): 84.8 ml  LA Volume Index (BP): 38.5 ml/m2  RWT: 0.32        Doppler Measurements & Calculations  PA acc time: 0.09 sec     PI end-d saumya: 154.0 cm/sec  TR max saumya: 247.0 cm/sec  TR max P.4 mmHg     _____________________________________________________________________________  __           Report approved by: Graciela Tomlinson 2020 12:05 PM      XR Chest Port 1 View    Narrative    XR CHEST PORT 1 VW  2020 4:21 PM      HISTORY: SG Placement    COMPARISON: None available    FINDINGS: Single AP chest radiograph. Corwith-Chucky catheter tip is in the  proximal right main pulmonary artery. Right central line tip and right  upper extremity PICC line tip at the lower SVC. Trachea is midline,  cardiomegaly. Bilateral perihilar streaky opacities. Mild increased  interstitial opacities in the right lung with ill-defined pulmonary  vascularity. No pneumothorax or pleural effusion. No acute osseous or  abdominal abnormality. Elevated left hemidiaphragm.      Impression    IMPRESSION:  1. Corwith-Chucky catheter tip at the proximal right main pulmonary artery.  2. Cardiomegaly with mild pulmonary edema, especially on the right.    I have personally reviewed the examination and initial interpretation  and I agree with the findings.    DONG SOLORIO MD   Cardiac Catheterization    Narrative      Successful insertion of a IABP in the RFA                Medications     Current Facility-Administered Medications   Medication     acetaminophen (TYLENOL) tablet 650 mg     albuterol (PROAIR HFA/PROVENTIL HFA/VENTOLIN HFA) 108 (90 Base) MCG/ACT inhaler 2 puff     allopurinol (ZYLOPRIM) tablet 200 mg     amiodarone (PACERONE) tablet 400 mg     ampicillin (OMNIPEN) 2  g vial to attach to  ml bag     aspirin (ASA) chewable tablet 81 mg     atorvastatin (LIPITOR) tablet 20 mg     bisacodyl (DULCOLAX) EC tablet 5 mg    Or     bisacodyl (DULCOLAX) EC tablet 10 mg    Or     bisacodyl (DULCOLAX) EC tablet 15 mg     bivalirudin (ANGIOMAX) 250 mg in sodium chloride 0.9 % 250 mL ANTICOAGULANT infusion     calcium carbonate (TUMS) chewable tablet 500 mg     cefTRIAXone (ROCEPHIN) 2 g vial to attach to  ml bag for ADULTS or NS 50 ml bag for PEDS     co-enzyme Q-10 capsule 200 mg     glucose gel 15-30 g    Or     dextrose 50 % injection 25-50 mL    Or     glucagon injection 1 mg     diclofenac (VOLTAREN) 1 % topical gel 4 g     DOBUTamine (DOBUTREX) 1,000 mg in D5W 250 mL infusion (adult max conc)     fentaNYL (PF) (SUBLIMAZE) injection 25 mcg     fluconazole (DIFLUCAN) intermittent infusion 200 mg     FLUoxetine (PROzac) capsule 20 mg     HOLD MEDICATION     HOLD:  All AM Medications     insulin aspart (NovoLOG) inj (RAPID ACTING)     insulin aspart (NovoLOG) inj (RAPID ACTING)     insulin glargine (LANTUS PEN) injection 10 Units     levofloxacin (LEVAQUIN) infusion 500 mg     levofloxacin (LEVAQUIN) infusion 500 mg     lidocaine (LMX4) cream     lidocaine 1 % 0.1-1 mL     magnesium oxide (MAG-OX) tablet 400 mg     medication instruction     melatonin tablet 3 mg     naloxone (NARCAN) injection 0.1-0.4 mg     omeprazole (priLOSEC) CR capsule 20 mg     polyethylene glycol (MIRALAX) Packet 17 g     rifampin (RIFADIN) 600 mg in sodium chloride 0.9 % 100 mL intermittent infusion     senna-docusate (SENOKOT-S/PERICOLACE) 8.6-50 MG per tablet 1 tablet    Or     senna-docusate (SENOKOT-S/PERICOLACE) 8.6-50 MG per tablet 2 tablet     sodium chloride (PF) 0.9% PF flush 3 mL     sodium chloride (PF) 0.9% PF flush 3 mL     vancomycin 1500 mg in 0.9% NaCl 250 ml intermittent infusion 1,500 mg     vancomycin 1500 mg in 0.9% NaCl 250 ml intermittent infusion 1,500 mg     vitamin B1 (THIAMINE)  tablet 100 mg

## 2020-02-18 NOTE — PROGRESS NOTES
CV ICU H&P  2/18/2020      CO-MORBIDITIES:   Patient Active Problem List   Diagnosis     Acute on chronic systolic congestive heart failure (H)     Anxiety     CKD (chronic kidney disease) stage 3, GFR 30-59 ml/min (H)     Coronary artery disease involving native coronary artery of native heart without angina pectoris     GERD (gastroesophageal reflux disease)     Gout     Hyperlipidemia with target LDL less than 100     Nonischemic cardiomyopathy (H)     Non-nephrotic range proteinuria     ABHINAV on CPAP     Type 2 diabetes mellitus with stage 3 chronic kidney disease, without long-term current use of insulin (H)     Heart failure (H)     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 Redd on 2/18 for chronic systolic heart failure 2/2 NIC.      PLAN:  Neuro/ pain/ sedation:  - Monitor neurological status. Notify the MD for any acute changes in exam.  - Fentanyl gtt for pain.  - Propofol gtt for sedation.  - continue home fluoxetine    Pulmonary care:   #COPD  - MV  - Titrate FiO2 for SpO2 >92%  - ABHINAV on CPAP, order CPAP while sleeping once extubated    Cardiovascular:    #Moderate CAD ([facility] 11/2019 nonobstructive CAD w/ severe branch disease)  #CHF EF 15-20% s/p LVAD placement  #Vfib this admission, s/p amio load  - Monitor hemodynamic status.   - MAP >65  - Hold PTA entresto, spironolactone  - continue ASA 81 mg, atorvastatin  - continue amio 400 mg BID    GI care:   - NPO except ice chips and medications.    Fluids/ Electrolytes/ Nutrition:   - TKO for IV fluid hydration  - No indication for parenteral nutrition.    Renal/ Fluid Balance:    #CKD III  - Urine output is adequate so far.  - Will continue to monitor intake and output.    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   - No chemical DVT prophylaxis due to high risk of bleeding.   - Protonix qd    Lines/ tubes/ drains:  - MAC, Odell, Arterial line, LIJ dialysis line, lombardo    Disposition:  - CV ICU.     Patient seen, findings and plan discussed with CV ICU staff, Dr. Garcia.    Scott Giron MD  CA-3/PGY-4 Anesthesiology  [phone]      ====================================    HPI:   Eliseo Tanner is a 64 year old male with PMH of ABHINAV, DM II, CKD stage 3, HTN, CAD s/p LVAD placement with Dr. Redd on 2/18 for chronic systolic heart failure 2/2 NICM.    Intraoperative course was notable for dilated RV with poor function, improved with Jeramy. Coagulopathy was noted requiring multiple blood products. Several suckdown events occurred once LVAD in place (identified on CHAMP), which improved with fluid/blood product resuscitation.     PAST MEDICAL HISTORY: No past medical history on file.    PAST SURGICAL HISTORY:   Past Surgical History:   Procedure Laterality Date     CV CENTRAL VENOUS CATHETER PLACEMENT N/A 2/13/2020    Procedure: Central Venous Catheter Placement;  Surgeon: Chente Moss MD;  Location:  HEART CARDIAC CATH LAB     CV INTRA-AORTIC BALLOON PUMP INSERTION N/A 2/7/2020    Procedure: Intra-Aortic Balloon Pump Insertion;  Surgeon: Jose Baldwin MD;  Location:  HEART CARDIAC CATH LAB     CV INTRA-AORTIC BALLOON PUMP INSERTION N/A 2/13/2020    Procedure: Intra-Aortic Balloon Pump Insertion;  Surgeon: Chente Moss,  MD;  Location:  HEART CARDIAC CATH LAB     CV SWAN CHUCKY PROCEDURE N/A 2/13/2020    Procedure: Gilbert Chucky Procedure;  Surgeon: Chente Moss MD;  Location:  HEART CARDIAC CATH LAB       FAMILY HISTORY: No family history on file.    SOCIAL HISTORY:   Social History     Tobacco Use     Smoking status: Not on file   Substance Use Topics     Alcohol use: Not on file         OBJECTIVE:   1. VITAL SIGNS:   Temp:  [97.9  F (36.6  C)-98.4  F (36.9  C)] 98.4  F (36.9  C)  Heart Rate:  [68-89] 75  Resp:  [8-28] 22  MAP:  [74 mmHg-105 mmHg] 90 mmHg  Arterial Line BP: (136-177)/(24-51) 153/51  SpO2:  [93 %-100 %] 97 %    Resp: 22        2. INTAKE/ OUTPUT:   I/O last 3 completed shifts:  In: 1741.05 [P.O.:450; I.V.:1291.05]  Out: 4075 [Urine:4075]    3. PHYSICAL EXAMINATION:   General: intubated, sedated  Neuro: sedated, coughs with oral cares  Resp: MV  CV: RRR  Abdomen: Soft, Non-distended, Non-tender  Incisions: c/d/i, CT with sanguinous ouput  Extremities: warm and well perfused    4. INVESTIGATIONS:   Arterial Blood Gases     Recent Labs   Lab 02/18/20  0414 02/17/20  1609 02/17/20  0435  02/16/20  0747  02/15/20  1658   WBC 11.5*  --  12.6*  --   --    < > 12.2*   HGB 12.1*  --  13.5  --   --    < > 13.6     --  179  --   --    < > 126*   INR  --   --   --   --  1.33*  --  1.31*    142 138   < >  --    < > 136   POTASSIUM 3.6 3.7 3.3*   < >  --    < > 3.7   BUN 39* 35* 37*   < >  --    < > 28   CR 1.48* 1.45* 1.56*   < >  --    < > 1.34*    < > = values in this interval not displayed.           5. RADIOLOGY:     =========================================

## 2020-02-18 NOTE — PLAN OF CARE
Patient transferred from  to OR fir LVAD implantation around 4:15pm today. Pre LVAD surgery huddle / consent checklist completed with Bedside ICU RN, OR RN, CRNA, Anesthesia MD, Surgeon, and LVAD coordinator. Family at bedside when patient transferred, and moved to OR waiting room. Aware that patient will return to  tonight to a different room.     Ray Olson RN

## 2020-02-18 NOTE — PROVIDER NOTIFICATION
Per Dr. Bajwa from nephrology, both ports of left internal jugular CVC for HD line are saline locked for OR today.     Ray Olson RN

## 2020-02-18 NOTE — PLAN OF CARE
Major Shift Events: No acute events today. Patient neuro intact, denies pain. VS stable, BPs elevated, MAPs 90s, NSR 70-80s. IABP 1:1 augmenting 100%. CVP 9-10, PA pressures 50/20s, CO 6.0, CI 2.8, SVR 1100s. Room air, home CPAP at night, denies SOB. Guevara with  ml /hr. Dobutamine @ 7.5 / hr. Bilvalirudin off at at 12pm for surgery today.     Plan: Patient transferred down to OR for LVAD implantation today at 4:15pm.     For vital signs and complete assessments, please see documentation flowsheets.     Ray Olson RN

## 2020-02-18 NOTE — PLAN OF CARE
Neuro: Pt A&Ox4. Able to move all extremities and follow commands. Denied pain.     Cardiac: SR. HR 70s-80s. MAPs 90s - At ~0000, Dopamine gtt stopped and Bumex 2mg given per MD. Dobutamine infusing at 7.5mcg/kg/min. IABP 1:1 with 100% augmentation. Jane numbers completed Q4hrs - see flowsheet.     Respiratory: On RA when awake. On CPAP with 3L O2 when asleep. Lung sounds clear.    GI/: NPO since midnight. Soft, large BM x1. Urine output 85-105ml/hr. Good urine response to Bumex.    Lines: R IJ with swan. L IJ dialysis cath. PIVx2. R arterial line. Angiomax gtt infusing.     Family present at bedside until ~2100.  Continue to monitor and update MD as needed. Continue plan of care.       Problem: Adult Inpatient Plan of Care  Goal: Plan of Care Review  2/18/2020 0649 by Gail White, RN  Outcome: No Change

## 2020-02-18 NOTE — PROGRESS NOTES
"Pre-LVAD Social Work Services Progress Note      Date of Initial Social Work Evaluation: 02/10/2020  Collaborated with: Pt, Pt's 3 children (Cyrus, Mayra, C___) at bedside    Data: Pt scheduled for urgent LVAD implant this afternoon. Writer checked in with Pt and family prior to surgery to address any questions/concerns. Pt's wife (Veronique) not at bedside, but plans to arrive before surgery.  Intervention: Supportive visit  Assessment: Pt and family acknowledged difficult weekend. Pt's 3 children are staying at local [facility] - remain on waitlist for [facility] and [facility]. Plan for wife (Veronique) and sister in law (Grecia) to be caregivers. Pt's daughter (Mayra) plans to complete education to be an additional caregiver. Writer reviewed information on LVAD teaching, relocation requirement, possibility of ARU/TCU with 3 children, who were not present during prior discussion. Pt's family hopes to attend support group depending on Pt's pre-op. Pt's family denied additional questions.    Writer briefly inquired about Pt's thoughts surrounding LVAD implant today. Pt acknowledged medical challenges this weekend and reiterated he \"isn't a quitter\". Pt denied additional questions at this time. Hopeful for positive outcome post LVAD.     Education provided by SW: Relocation resources, post-surgery expectations, LVAD teaching, support groups, ongoing SW visits  Plan:    Discharge Plans in Progress: None    Barriers to d/c plan: LVAD implant 02/18 (afternoon)    Follow up Plan: Ongoing SW visits, [facility]    WENCESLAO White  MSW Student   Pager [phone]  "

## 2020-02-18 NOTE — PROGRESS NOTES
"SPIRITUAL HEALTH SERVICES  SPIRITUAL ASSESSMENT Progress Note  [facility] 4E     REFERRAL SOURCE: visited with pt, spouse, daughter, and son, per pt request in anticipation of LVAD placement this afternoon.    Pt coping well, said \"I feel ready - it's time to just do it...\" We talked about how LVAD has changed and improved over the years - pt/family grateful for the care pt is receiving. Prayer and blessing were shared. Pt said his  is aware of surgery.      PLAN: continue to follow, will check in on pt again this week.     Ad Mccoy) Hernandez Krause M.Div., [facility]  Staff   Pager [phone]      "

## 2020-02-19 ENCOUNTER — ANESTHESIA (OUTPATIENT)
Dept: INTENSIVE CARE | Facility: CLINIC | Age: 65
End: 2020-02-19

## 2020-02-19 ENCOUNTER — APPOINTMENT (OUTPATIENT)
Dept: GENERAL RADIOLOGY | Facility: CLINIC | Age: 65
DRG: 001 | End: 2020-02-19
Attending: STUDENT IN AN ORGANIZED HEALTH CARE EDUCATION/TRAINING PROGRAM
Payer: MEDICARE

## 2020-02-19 ENCOUNTER — APPOINTMENT (OUTPATIENT)
Dept: GENERAL RADIOLOGY | Facility: CLINIC | Age: 65
DRG: 001 | End: 2020-02-19
Attending: INTERNAL MEDICINE
Payer: MEDICARE

## 2020-02-19 ENCOUNTER — ANESTHESIA EVENT (OUTPATIENT)
Dept: INTENSIVE CARE | Facility: CLINIC | Age: 65
End: 2020-02-19

## 2020-02-19 LAB
ABO + RH BLD: NORMAL
ABO + RH BLD: NORMAL
ALBUMIN SERPL-MCNC: 1.8 G/DL (ref 3.4–5)
ALBUMIN SERPL-MCNC: 2.9 G/DL (ref 3.4–5)
ALP SERPL-CCNC: 36 U/L (ref 40–150)
ALP SERPL-CCNC: 41 U/L (ref 40–150)
ALT SERPL W P-5'-P-CCNC: 19 U/L (ref 0–70)
ALT SERPL W P-5'-P-CCNC: 19 U/L (ref 0–70)
ANION GAP SERPL CALCULATED.3IONS-SCNC: 3 MMOL/L (ref 3–14)
ANION GAP SERPL CALCULATED.3IONS-SCNC: 5 MMOL/L (ref 3–14)
ANION GAP SERPL CALCULATED.3IONS-SCNC: 7 MMOL/L (ref 3–14)
ANION GAP SERPL CALCULATED.3IONS-SCNC: 9 MMOL/L (ref 3–14)
APTT PPP: 36 SEC (ref 22–37)
APTT PPP: 37 SEC (ref 22–37)
APTT PPP: 38 SEC (ref 22–37)
APTT PPP: 43 SEC (ref 22–37)
AST SERPL W P-5'-P-CCNC: 46 U/L (ref 0–45)
AST SERPL W P-5'-P-CCNC: 55 U/L (ref 0–45)
BASE EXCESS BLDA CALC-SCNC: 4.2 MMOL/L
BASE EXCESS BLDA CALC-SCNC: 4.6 MMOL/L
BASE EXCESS BLDA CALC-SCNC: 4.7 MMOL/L
BASE EXCESS BLDA CALC-SCNC: 5 MMOL/L
BASE EXCESS BLDA CALC-SCNC: 6 MMOL/L
BASE EXCESS BLDV CALC-SCNC: 3.8 MMOL/L
BASE EXCESS BLDV CALC-SCNC: 4 MMOL/L
BASE EXCESS BLDV CALC-SCNC: 4.2 MMOL/L
BASE EXCESS BLDV CALC-SCNC: 5.2 MMOL/L
BASE EXCESS BLDV CALC-SCNC: 5.3 MMOL/L
BASE EXCESS BLDV CALC-SCNC: 6.4 MMOL/L
BASOPHILS # BLD AUTO: 0 10E9/L (ref 0–0.2)
BASOPHILS # BLD AUTO: 0 10E9/L (ref 0–0.2)
BASOPHILS NFR BLD AUTO: 0 %
BASOPHILS NFR BLD AUTO: 0 %
BILIRUB SERPL-MCNC: 1.3 MG/DL (ref 0.2–1.3)
BILIRUB SERPL-MCNC: 1.5 MG/DL (ref 0.2–1.3)
BLD GP AB SCN SERPL QL: NORMAL
BLD PROD TYP BPU: NORMAL
BLD UNIT ID BPU: 0
BLOOD BANK CMNT PATIENT-IMP: NORMAL
BLOOD PRODUCT CODE: NORMAL
BPU ID: NORMAL
BUN SERPL-MCNC: 28 MG/DL (ref 7–30)
BUN SERPL-MCNC: 31 MG/DL (ref 7–30)
BUN SERPL-MCNC: 34 MG/DL (ref 7–30)
BUN SERPL-MCNC: 35 MG/DL (ref 7–30)
CA-I BLD-MCNC: 4.1 MG/DL (ref 4.4–5.2)
CA-I BLD-MCNC: 4.2 MG/DL (ref 4.4–5.2)
CA-I BLD-MCNC: 4.3 MG/DL (ref 4.4–5.2)
CALCIUM SERPL-MCNC: 7.4 MG/DL (ref 8.5–10.1)
CALCIUM SERPL-MCNC: 7.4 MG/DL (ref 8.5–10.1)
CALCIUM SERPL-MCNC: 7.6 MG/DL (ref 8.5–10.1)
CALCIUM SERPL-MCNC: 7.6 MG/DL (ref 8.5–10.1)
CHLORIDE SERPL-SCNC: 106 MMOL/L (ref 94–109)
CHLORIDE SERPL-SCNC: 108 MMOL/L (ref 94–109)
CHLORIDE SERPL-SCNC: 109 MMOL/L (ref 94–109)
CHLORIDE SERPL-SCNC: 110 MMOL/L (ref 94–109)
CO2 SERPL-SCNC: 29 MMOL/L (ref 20–32)
CO2 SERPL-SCNC: 30 MMOL/L (ref 20–32)
CO2 SERPL-SCNC: 30 MMOL/L (ref 20–32)
CO2 SERPL-SCNC: 31 MMOL/L (ref 20–32)
CREAT SERPL-MCNC: 1.39 MG/DL (ref 0.66–1.25)
CREAT SERPL-MCNC: 1.55 MG/DL (ref 0.66–1.25)
CREAT SERPL-MCNC: 1.66 MG/DL (ref 0.66–1.25)
CREAT SERPL-MCNC: 1.71 MG/DL (ref 0.66–1.25)
DIFFERENTIAL METHOD BLD: ABNORMAL
DIFFERENTIAL METHOD BLD: ABNORMAL
EOSINOPHIL # BLD AUTO: 0 10E9/L (ref 0–0.7)
EOSINOPHIL # BLD AUTO: 0.1 10E9/L (ref 0–0.7)
EOSINOPHIL NFR BLD AUTO: 0 %
EOSINOPHIL NFR BLD AUTO: 0.9 %
ERYTHROCYTE [DISTWIDTH] IN BLOOD BY AUTOMATED COUNT: 14.1 % (ref 10–15)
ERYTHROCYTE [DISTWIDTH] IN BLOOD BY AUTOMATED COUNT: 14.3 % (ref 10–15)
ERYTHROCYTE [DISTWIDTH] IN BLOOD BY AUTOMATED COUNT: 14.3 % (ref 10–15)
ERYTHROCYTE [DISTWIDTH] IN BLOOD BY AUTOMATED COUNT: 14.5 % (ref 10–15)
ERYTHROCYTE [DISTWIDTH] IN BLOOD BY AUTOMATED COUNT: 14.8 % (ref 10–15)
ERYTHROCYTE [DISTWIDTH] IN BLOOD BY AUTOMATED COUNT: 14.8 % (ref 10–15)
ERYTHROCYTE [DISTWIDTH] IN BLOOD BY AUTOMATED COUNT: 14.9 % (ref 10–15)
ERYTHROCYTE [DISTWIDTH] IN BLOOD BY AUTOMATED COUNT: 15.2 % (ref 10–15)
FIBRINOGEN PPP-MCNC: 171 MG/DL (ref 200–420)
FIBRINOGEN PPP-MCNC: 240 MG/DL (ref 200–420)
FIBRINOGEN PPP-MCNC: 247 MG/DL (ref 200–420)
GFR SERPL CREATININE-BSD FRML MDRD: 41 ML/MIN/{1.73_M2}
GFR SERPL CREATININE-BSD FRML MDRD: 42 ML/MIN/{1.73_M2}
GFR SERPL CREATININE-BSD FRML MDRD: 46 ML/MIN/{1.73_M2}
GFR SERPL CREATININE-BSD FRML MDRD: 52 ML/MIN/{1.73_M2}
GLUCOSE BLDC GLUCOMTR-MCNC: 107 MG/DL (ref 70–99)
GLUCOSE BLDC GLUCOMTR-MCNC: 111 MG/DL (ref 70–99)
GLUCOSE BLDC GLUCOMTR-MCNC: 113 MG/DL (ref 70–99)
GLUCOSE BLDC GLUCOMTR-MCNC: 114 MG/DL (ref 70–99)
GLUCOSE BLDC GLUCOMTR-MCNC: 117 MG/DL (ref 70–99)
GLUCOSE BLDC GLUCOMTR-MCNC: 118 MG/DL (ref 70–99)
GLUCOSE BLDC GLUCOMTR-MCNC: 121 MG/DL (ref 70–99)
GLUCOSE BLDC GLUCOMTR-MCNC: 128 MG/DL (ref 70–99)
GLUCOSE BLDC GLUCOMTR-MCNC: 130 MG/DL (ref 70–99)
GLUCOSE BLDC GLUCOMTR-MCNC: 137 MG/DL (ref 70–99)
GLUCOSE BLDC GLUCOMTR-MCNC: 144 MG/DL (ref 70–99)
GLUCOSE BLDC GLUCOMTR-MCNC: 144 MG/DL (ref 70–99)
GLUCOSE BLDC GLUCOMTR-MCNC: 145 MG/DL (ref 70–99)
GLUCOSE BLDC GLUCOMTR-MCNC: 145 MG/DL (ref 70–99)
GLUCOSE BLDC GLUCOMTR-MCNC: 149 MG/DL (ref 70–99)
GLUCOSE BLDC GLUCOMTR-MCNC: 150 MG/DL (ref 70–99)
GLUCOSE BLDC GLUCOMTR-MCNC: 151 MG/DL (ref 70–99)
GLUCOSE BLDC GLUCOMTR-MCNC: 154 MG/DL (ref 70–99)
GLUCOSE BLDC GLUCOMTR-MCNC: 158 MG/DL (ref 70–99)
GLUCOSE SERPL-MCNC: 119 MG/DL (ref 70–99)
GLUCOSE SERPL-MCNC: 129 MG/DL (ref 70–99)
GLUCOSE SERPL-MCNC: 146 MG/DL (ref 70–99)
GLUCOSE SERPL-MCNC: 158 MG/DL (ref 70–99)
HCO3 BLD-SCNC: 29 MMOL/L (ref 21–28)
HCO3 BLD-SCNC: 30 MMOL/L (ref 21–28)
HCO3 BLDV-SCNC: 29 MMOL/L (ref 21–28)
HCO3 BLDV-SCNC: 29 MMOL/L (ref 21–28)
HCO3 BLDV-SCNC: 30 MMOL/L (ref 21–28)
HCO3 BLDV-SCNC: 31 MMOL/L (ref 21–28)
HCT VFR BLD AUTO: 18.9 % (ref 40–53)
HCT VFR BLD AUTO: 21.9 % (ref 40–53)
HCT VFR BLD AUTO: 22.4 % (ref 40–53)
HCT VFR BLD AUTO: 24.7 % (ref 40–53)
HCT VFR BLD AUTO: 24.8 % (ref 40–53)
HCT VFR BLD AUTO: 25.7 % (ref 40–53)
HCT VFR BLD AUTO: 27.1 % (ref 40–53)
HCT VFR BLD AUTO: 29.9 % (ref 40–53)
HGB BLD-MCNC: 10 G/DL (ref 13.3–17.7)
HGB BLD-MCNC: 6.4 G/DL (ref 13.3–17.7)
HGB BLD-MCNC: 7.4 G/DL (ref 13.3–17.7)
HGB BLD-MCNC: 7.6 G/DL (ref 13.3–17.7)
HGB BLD-MCNC: 8.2 G/DL (ref 13.3–17.7)
HGB BLD-MCNC: 8.3 G/DL (ref 13.3–17.7)
HGB BLD-MCNC: 8.7 G/DL (ref 13.3–17.7)
HGB BLD-MCNC: 9.2 G/DL (ref 13.3–17.7)
INR PPP: 1.03 (ref 0.86–1.14)
INR PPP: 1.24 (ref 0.86–1.14)
INR PPP: 1.31 (ref 0.86–1.14)
INR PPP: 1.4 (ref 0.86–1.14)
INR PPP: 1.45 (ref 0.86–1.14)
INR PPP: 1.59 (ref 0.86–1.14)
INTERPRETATION ECG - MUSE: NORMAL
LACTATE BLD-SCNC: 0.8 MMOL/L (ref 0.7–2)
LACTATE BLD-SCNC: 1.1 MMOL/L (ref 0.7–2)
LACTATE BLD-SCNC: 1.7 MMOL/L (ref 0.7–2)
LACTATE BLD-SCNC: 1.9 MMOL/L (ref 0.7–2)
LACTATE BLD-SCNC: 3.6 MMOL/L (ref 0.7–2)
LYMPHOCYTES # BLD AUTO: 0.8 10E9/L (ref 0.8–5.3)
LYMPHOCYTES # BLD AUTO: 1.1 10E9/L (ref 0.8–5.3)
LYMPHOCYTES NFR BLD AUTO: 9.7 %
LYMPHOCYTES NFR BLD AUTO: 9.8 %
MAGNESIUM SERPL-MCNC: 1.9 MG/DL (ref 1.6–2.3)
MAGNESIUM SERPL-MCNC: 2 MG/DL (ref 1.6–2.3)
MAGNESIUM SERPL-MCNC: 2 MG/DL (ref 1.6–2.3)
MAGNESIUM SERPL-MCNC: 2.4 MG/DL (ref 1.6–2.3)
MCH RBC QN AUTO: 29 PG (ref 26.5–33)
MCH RBC QN AUTO: 29 PG (ref 26.5–33)
MCH RBC QN AUTO: 29.1 PG (ref 26.5–33)
MCH RBC QN AUTO: 29.3 PG (ref 26.5–33)
MCH RBC QN AUTO: 29.5 PG (ref 26.5–33)
MCH RBC QN AUTO: 29.6 PG (ref 26.5–33)
MCH RBC QN AUTO: 29.6 PG (ref 26.5–33)
MCH RBC QN AUTO: 29.8 PG (ref 26.5–33)
MCHC RBC AUTO-ENTMCNC: 33.2 G/DL (ref 31.5–36.5)
MCHC RBC AUTO-ENTMCNC: 33.4 G/DL (ref 31.5–36.5)
MCHC RBC AUTO-ENTMCNC: 33.5 G/DL (ref 31.5–36.5)
MCHC RBC AUTO-ENTMCNC: 33.8 G/DL (ref 31.5–36.5)
MCHC RBC AUTO-ENTMCNC: 33.9 G/DL (ref 31.5–36.5)
MCV RBC AUTO: 86 FL (ref 78–100)
MCV RBC AUTO: 87 FL (ref 78–100)
MCV RBC AUTO: 88 FL (ref 78–100)
MCV RBC AUTO: 88 FL (ref 78–100)
MCV RBC AUTO: 89 FL (ref 78–100)
MCV RBC AUTO: 89 FL (ref 78–100)
METAMYELOCYTES # BLD: 0.1 10E9/L
METAMYELOCYTES NFR BLD MANUAL: 0.9 %
MONOCYTES # BLD AUTO: 0.4 10E9/L (ref 0–1.3)
MONOCYTES # BLD AUTO: 0.4 10E9/L (ref 0–1.3)
MONOCYTES NFR BLD AUTO: 3.6 %
MONOCYTES NFR BLD AUTO: 4.4 %
MRSA DNA SPEC QL NAA+PROBE: NEGATIVE
MYELOCYTES # BLD: 0.1 10E9/L
MYELOCYTES # BLD: 0.1 10E9/L
MYELOCYTES NFR BLD MANUAL: 0.9 %
MYELOCYTES NFR BLD MANUAL: 1.8 %
NEUTROPHILS # BLD AUTO: 6.9 10E9/L (ref 1.6–8.3)
NEUTROPHILS # BLD AUTO: 9.8 10E9/L (ref 1.6–8.3)
NEUTROPHILS NFR BLD AUTO: 83.2 %
NEUTROPHILS NFR BLD AUTO: 84.8 %
NRBC # BLD AUTO: 0.1 10*3/UL
NRBC BLD AUTO-RTO: 1 /100
NUM BPU REQUESTED: 1
NUM BPU REQUESTED: 2
NUM BPU REQUESTED: 5
NUM BPU REQUESTED: 9
O2/TOTAL GAS SETTING VFR VENT: 40 %
O2/TOTAL GAS SETTING VFR VENT: 50 %
O2/TOTAL GAS SETTING VFR VENT: ABNORMAL %
O2/TOTAL GAS SETTING VFR VENT: ABNORMAL %
OXYHGB MFR BLD: 98 % (ref 92–100)
OXYHGB MFR BLD: 98 % (ref 92–100)
OXYHGB MFR BLDV: 39 %
OXYHGB MFR BLDV: 46 %
OXYHGB MFR BLDV: 49 %
OXYHGB MFR BLDV: 55 %
OXYHGB MFR BLDV: 60 %
OXYHGB MFR BLDV: 65 %
PCO2 BLD: 38 MM HG (ref 35–45)
PCO2 BLD: 41 MM HG (ref 35–45)
PCO2 BLD: 41 MM HG (ref 35–45)
PCO2 BLD: 42 MM HG (ref 35–45)
PCO2 BLD: 45 MM HG (ref 35–45)
PCO2 BLDV: 43 MM HG (ref 40–50)
PCO2 BLDV: 43 MM HG (ref 40–50)
PCO2 BLDV: 47 MM HG (ref 40–50)
PCO2 BLDV: 49 MM HG (ref 40–50)
PCO2 BLDV: 49 MM HG (ref 40–50)
PCO2 BLDV: 54 MM HG (ref 40–50)
PF4 HEPARIN CMPLX AB SER QL: NEGATIVE
PH BLD: 7.42 PH (ref 7.35–7.45)
PH BLD: 7.45 PH (ref 7.35–7.45)
PH BLD: 7.46 PH (ref 7.35–7.45)
PH BLD: 7.46 PH (ref 7.35–7.45)
PH BLD: 7.5 PH (ref 7.35–7.45)
PH BLDV: 7.36 PH (ref 7.32–7.43)
PH BLDV: 7.39 PH (ref 7.32–7.43)
PH BLDV: 7.41 PH (ref 7.32–7.43)
PH BLDV: 7.42 PH (ref 7.32–7.43)
PH BLDV: 7.43 PH (ref 7.32–7.43)
PH BLDV: 7.46 PH (ref 7.32–7.43)
PHOSPHATE SERPL-MCNC: 4 MG/DL (ref 2.5–4.5)
PHOSPHATE SERPL-MCNC: 4.4 MG/DL (ref 2.5–4.5)
PHOSPHATE SERPL-MCNC: 4.6 MG/DL (ref 2.5–4.5)
PHOSPHATE SERPL-MCNC: 5 MG/DL (ref 2.5–4.5)
PLATELET # BLD AUTO: 102 10E9/L (ref 150–450)
PLATELET # BLD AUTO: 121 10E9/L (ref 150–450)
PLATELET # BLD AUTO: 122 10E9/L (ref 150–450)
PLATELET # BLD AUTO: 127 10E9/L (ref 150–450)
PLATELET # BLD AUTO: 129 10E9/L (ref 150–450)
PLATELET # BLD AUTO: 140 10E9/L (ref 150–450)
PLATELET # BLD AUTO: 149 10E9/L (ref 150–450)
PLATELET # BLD AUTO: 152 10E9/L (ref 150–450)
PLATELET # BLD EST: ABNORMAL 10*3/UL
PLATELET # BLD EST: ABNORMAL 10*3/UL
PO2 BLD: 134 MM HG (ref 80–105)
PO2 BLD: 143 MM HG (ref 80–105)
PO2 BLD: 186 MM HG (ref 80–105)
PO2 BLD: 232 MM HG (ref 80–105)
PO2 BLD: 372 MM HG (ref 80–105)
PO2 BLDV: 22 MM HG (ref 25–47)
PO2 BLDV: 26 MM HG (ref 25–47)
PO2 BLDV: 27 MM HG (ref 25–47)
PO2 BLDV: 31 MM HG (ref 25–47)
PO2 BLDV: 31 MM HG (ref 25–47)
PO2 BLDV: 33 MM HG (ref 25–47)
POIKILOCYTOSIS BLD QL SMEAR: SLIGHT
POIKILOCYTOSIS BLD QL SMEAR: SLIGHT
POLYCHROMASIA BLD QL SMEAR: SLIGHT
POTASSIUM BLD-SCNC: 4 MMOL/L (ref 3.4–5.3)
POTASSIUM SERPL-SCNC: 3.6 MMOL/L (ref 3.4–5.3)
POTASSIUM SERPL-SCNC: 3.7 MMOL/L (ref 3.4–5.3)
POTASSIUM SERPL-SCNC: 3.8 MMOL/L (ref 3.4–5.3)
POTASSIUM SERPL-SCNC: 4.1 MMOL/L (ref 3.4–5.3)
POTASSIUM SERPL-SCNC: 4.1 MMOL/L (ref 3.4–5.3)
PROT SERPL-MCNC: 3.5 G/DL (ref 6.8–8.8)
PROT SERPL-MCNC: 4.4 G/DL (ref 6.8–8.8)
RBC # BLD AUTO: 2.15 10E12/L (ref 4.4–5.9)
RBC # BLD AUTO: 2.54 10E12/L (ref 4.4–5.9)
RBC # BLD AUTO: 2.58 10E12/L (ref 4.4–5.9)
RBC # BLD AUTO: 2.8 10E12/L (ref 4.4–5.9)
RBC # BLD AUTO: 2.8 10E12/L (ref 4.4–5.9)
RBC # BLD AUTO: 3 10E12/L (ref 4.4–5.9)
RBC # BLD AUTO: 3.17 10E12/L (ref 4.4–5.9)
RBC # BLD AUTO: 3.38 10E12/L (ref 4.4–5.9)
SODIUM SERPL-SCNC: 143 MMOL/L (ref 133–144)
SODIUM SERPL-SCNC: 143 MMOL/L (ref 133–144)
SODIUM SERPL-SCNC: 144 MMOL/L (ref 133–144)
SODIUM SERPL-SCNC: 146 MMOL/L (ref 133–144)
SPECIMEN EXP DATE BLD: NORMAL
SPECIMEN SOURCE: NORMAL
TARGETS BLD QL SMEAR: SLIGHT
TRANSFUSION STATUS PATIENT QL: NORMAL
WBC # BLD AUTO: 11.4 10E9/L (ref 4–11)
WBC # BLD AUTO: 11.5 10E9/L (ref 4–11)
WBC # BLD AUTO: 12.5 10E9/L (ref 4–11)
WBC # BLD AUTO: 18.4 10E9/L (ref 4–11)
WBC # BLD AUTO: 7.1 10E9/L (ref 4–11)
WBC # BLD AUTO: 7.6 10E9/L (ref 4–11)
WBC # BLD AUTO: 7.6 10E9/L (ref 4–11)
WBC # BLD AUTO: 8.3 10E9/L (ref 4–11)

## 2020-02-19 PROCEDURE — 84132 ASSAY OF SERUM POTASSIUM: CPT | Performed by: STUDENT IN AN ORGANIZED HEALTH CARE EDUCATION/TRAINING PROGRAM

## 2020-02-19 PROCEDURE — 87641 MR-STAPH DNA AMP PROBE: CPT | Performed by: THORACIC SURGERY (CARDIOTHORACIC VASCULAR SURGERY)

## 2020-02-19 PROCEDURE — 84100 ASSAY OF PHOSPHORUS: CPT | Performed by: STUDENT IN AN ORGANIZED HEALTH CARE EDUCATION/TRAINING PROGRAM

## 2020-02-19 PROCEDURE — 25000132 ZZH RX MED GY IP 250 OP 250 PS 637: Mod: GY | Performed by: STUDENT IN AN ORGANIZED HEALTH CARE EDUCATION/TRAINING PROGRAM

## 2020-02-19 PROCEDURE — 94002 VENT MGMT INPAT INIT DAY: CPT

## 2020-02-19 PROCEDURE — 82330 ASSAY OF CALCIUM: CPT | Performed by: STUDENT IN AN ORGANIZED HEALTH CARE EDUCATION/TRAINING PROGRAM

## 2020-02-19 PROCEDURE — 85027 COMPLETE CBC AUTOMATED: CPT | Performed by: INTERNAL MEDICINE

## 2020-02-19 PROCEDURE — 85730 THROMBOPLASTIN TIME PARTIAL: CPT | Performed by: STUDENT IN AN ORGANIZED HEALTH CARE EDUCATION/TRAINING PROGRAM

## 2020-02-19 PROCEDURE — P9012 CRYOPRECIPITATE EACH UNIT: HCPCS | Performed by: STUDENT IN AN ORGANIZED HEALTH CARE EDUCATION/TRAINING PROGRAM

## 2020-02-19 PROCEDURE — 85025 COMPLETE CBC W/AUTO DIFF WBC: CPT | Performed by: STUDENT IN AN ORGANIZED HEALTH CARE EDUCATION/TRAINING PROGRAM

## 2020-02-19 PROCEDURE — 40000281 ZZH STATISTIC TRANSPORT TIME EA 15 MIN

## 2020-02-19 PROCEDURE — 85610 PROTHROMBIN TIME: CPT | Performed by: STUDENT IN AN ORGANIZED HEALTH CARE EDUCATION/TRAINING PROGRAM

## 2020-02-19 PROCEDURE — 82330 ASSAY OF CALCIUM: CPT | Performed by: THORACIC SURGERY (CARDIOTHORACIC VASCULAR SURGERY)

## 2020-02-19 PROCEDURE — 87640 STAPH A DNA AMP PROBE: CPT | Performed by: THORACIC SURGERY (CARDIOTHORACIC VASCULAR SURGERY)

## 2020-02-19 PROCEDURE — 00000146 ZZHCL STATISTIC GLUCOSE BY METER IP

## 2020-02-19 PROCEDURE — 40000014 ZZH STATISTIC ARTERIAL MONITORING DAILY

## 2020-02-19 PROCEDURE — 82805 BLOOD GASES W/O2 SATURATION: CPT | Performed by: THORACIC SURGERY (CARDIOTHORACIC VASCULAR SURGERY)

## 2020-02-19 PROCEDURE — 85384 FIBRINOGEN ACTIVITY: CPT | Performed by: STUDENT IN AN ORGANIZED HEALTH CARE EDUCATION/TRAINING PROGRAM

## 2020-02-19 PROCEDURE — 84520 ASSAY OF UREA NITROGEN: CPT | Performed by: STUDENT IN AN ORGANIZED HEALTH CARE EDUCATION/TRAINING PROGRAM

## 2020-02-19 PROCEDURE — 83735 ASSAY OF MAGNESIUM: CPT | Performed by: STUDENT IN AN ORGANIZED HEALTH CARE EDUCATION/TRAINING PROGRAM

## 2020-02-19 PROCEDURE — P9059 PLASMA, FRZ BETWEEN 8-24HOUR: HCPCS | Performed by: STUDENT IN AN ORGANIZED HEALTH CARE EDUCATION/TRAINING PROGRAM

## 2020-02-19 PROCEDURE — P9016 RBC LEUKOCYTES REDUCED: HCPCS | Performed by: PHYSICIAN ASSISTANT

## 2020-02-19 PROCEDURE — 93005 ELECTROCARDIOGRAM TRACING: CPT

## 2020-02-19 PROCEDURE — 40000985 XR CHEST PORT 1 VW

## 2020-02-19 PROCEDURE — 40000048 ZZH STATISTIC DAILY SWAN MONITORING

## 2020-02-19 PROCEDURE — 85027 COMPLETE CBC AUTOMATED: CPT | Performed by: STUDENT IN AN ORGANIZED HEALTH CARE EDUCATION/TRAINING PROGRAM

## 2020-02-19 PROCEDURE — P9037 PLATE PHERES LEUKOREDU IRRAD: HCPCS | Performed by: STUDENT IN AN ORGANIZED HEALTH CARE EDUCATION/TRAINING PROGRAM

## 2020-02-19 PROCEDURE — 25800030 ZZH RX IP 258 OP 636: Performed by: STUDENT IN AN ORGANIZED HEALTH CARE EDUCATION/TRAINING PROGRAM

## 2020-02-19 PROCEDURE — 82803 BLOOD GASES ANY COMBINATION: CPT | Performed by: THORACIC SURGERY (CARDIOTHORACIC VASCULAR SURGERY)

## 2020-02-19 PROCEDURE — 25000132 ZZH RX MED GY IP 250 OP 250 PS 637: Mod: GY | Performed by: INTERNAL MEDICINE

## 2020-02-19 PROCEDURE — 80053 COMPREHEN METABOLIC PANEL: CPT | Performed by: STUDENT IN AN ORGANIZED HEALTH CARE EDUCATION/TRAINING PROGRAM

## 2020-02-19 PROCEDURE — 82803 BLOOD GASES ANY COMBINATION: CPT | Performed by: STUDENT IN AN ORGANIZED HEALTH CARE EDUCATION/TRAINING PROGRAM

## 2020-02-19 PROCEDURE — 25000128 H RX IP 250 OP 636: Performed by: THORACIC SURGERY (CARDIOTHORACIC VASCULAR SURGERY)

## 2020-02-19 PROCEDURE — 40000196 ZZH STATISTIC RAPCV CVP MONITORING

## 2020-02-19 PROCEDURE — 85025 COMPLETE CBC W/AUTO DIFF WBC: CPT | Performed by: THORACIC SURGERY (CARDIOTHORACIC VASCULAR SURGERY)

## 2020-02-19 PROCEDURE — 99291 CRITICAL CARE FIRST HOUR: CPT | Mod: GC | Performed by: ANESTHESIOLOGY

## 2020-02-19 PROCEDURE — 99233 SBSQ HOSP IP/OBS HIGH 50: CPT | Mod: GC | Performed by: INTERNAL MEDICINE

## 2020-02-19 PROCEDURE — 94799 UNLISTED PULMONARY SVC/PX: CPT

## 2020-02-19 PROCEDURE — 71045 X-RAY EXAM CHEST 1 VIEW: CPT

## 2020-02-19 PROCEDURE — 80048 BASIC METABOLIC PNL TOTAL CA: CPT | Performed by: STUDENT IN AN ORGANIZED HEALTH CARE EDUCATION/TRAINING PROGRAM

## 2020-02-19 PROCEDURE — 82805 BLOOD GASES W/O2 SATURATION: CPT | Performed by: STUDENT IN AN ORGANIZED HEALTH CARE EDUCATION/TRAINING PROGRAM

## 2020-02-19 PROCEDURE — 25000128 H RX IP 250 OP 636: Performed by: STUDENT IN AN ORGANIZED HEALTH CARE EDUCATION/TRAINING PROGRAM

## 2020-02-19 PROCEDURE — 25000132 ZZH RX MED GY IP 250 OP 250 PS 637: Mod: GY

## 2020-02-19 PROCEDURE — 86022 PLATELET ANTIBODIES: CPT | Performed by: STUDENT IN AN ORGANIZED HEALTH CARE EDUCATION/TRAINING PROGRAM

## 2020-02-19 PROCEDURE — 83605 ASSAY OF LACTIC ACID: CPT | Performed by: STUDENT IN AN ORGANIZED HEALTH CARE EDUCATION/TRAINING PROGRAM

## 2020-02-19 PROCEDURE — 80048 BASIC METABOLIC PNL TOTAL CA: CPT | Performed by: INTERNAL MEDICINE

## 2020-02-19 PROCEDURE — 40000344 ZZHCL STATISTIC THAWING COMPONENT: Performed by: STUDENT IN AN ORGANIZED HEALTH CARE EDUCATION/TRAINING PROGRAM

## 2020-02-19 PROCEDURE — 25000125 ZZHC RX 250: Performed by: STUDENT IN AN ORGANIZED HEALTH CARE EDUCATION/TRAINING PROGRAM

## 2020-02-19 PROCEDURE — 20000004 ZZH R&B ICU UMMC

## 2020-02-19 PROCEDURE — C9113 INJ PANTOPRAZOLE SODIUM, VIA: HCPCS | Performed by: STUDENT IN AN ORGANIZED HEALTH CARE EDUCATION/TRAINING PROGRAM

## 2020-02-19 PROCEDURE — 25000128 H RX IP 250 OP 636

## 2020-02-19 PROCEDURE — 93010 ELECTROCARDIOGRAM REPORT: CPT | Performed by: INTERNAL MEDICINE

## 2020-02-19 PROCEDURE — P9041 ALBUMIN (HUMAN),5%, 50ML: HCPCS | Performed by: STUDENT IN AN ORGANIZED HEALTH CARE EDUCATION/TRAINING PROGRAM

## 2020-02-19 PROCEDURE — P9041 ALBUMIN (HUMAN),5%, 50ML: HCPCS

## 2020-02-19 PROCEDURE — 40000275 ZZH STATISTIC RCP TIME EA 10 MIN

## 2020-02-19 PROCEDURE — 82565 ASSAY OF CREATININE: CPT | Performed by: STUDENT IN AN ORGANIZED HEALTH CARE EDUCATION/TRAINING PROGRAM

## 2020-02-19 PROCEDURE — C9399 UNCLASSIFIED DRUGS OR BIOLOG: HCPCS

## 2020-02-19 RX ORDER — ALBUMIN, HUMAN INJ 5% 5 %
SOLUTION INTRAVENOUS
Status: DISCONTINUED
Start: 2020-02-19 | End: 2020-02-19 | Stop reason: HOSPADM

## 2020-02-19 RX ORDER — SODIUM CHLORIDE, SODIUM LACTATE, POTASSIUM CHLORIDE, CALCIUM CHLORIDE 600; 310; 30; 20 MG/100ML; MG/100ML; MG/100ML; MG/100ML
INJECTION, SOLUTION INTRAVENOUS CONTINUOUS PRN
Status: DISCONTINUED | OUTPATIENT
Start: 2020-02-18 | End: 2020-02-19

## 2020-02-19 RX ORDER — NICOTINE POLACRILEX 4 MG
15-30 LOZENGE BUCCAL
Status: DISCONTINUED | OUTPATIENT
Start: 2020-02-19 | End: 2020-03-20 | Stop reason: HOSPADM

## 2020-02-19 RX ORDER — DEXTROSE MONOHYDRATE 100 MG/ML
INJECTION, SOLUTION INTRAVENOUS CONTINUOUS PRN
Status: DISCONTINUED | OUTPATIENT
Start: 2020-02-19 | End: 2020-02-24

## 2020-02-19 RX ORDER — ALBUMIN, HUMAN INJ 5% 5 %
SOLUTION INTRAVENOUS
Status: COMPLETED
Start: 2020-02-19 | End: 2020-02-19

## 2020-02-19 RX ORDER — DEXTROSE MONOHYDRATE 25 G/50ML
25-50 INJECTION, SOLUTION INTRAVENOUS
Status: DISCONTINUED | OUTPATIENT
Start: 2020-02-19 | End: 2020-03-20 | Stop reason: HOSPADM

## 2020-02-19 RX ORDER — DOBUTAMINE HCL IN DEXTROSE 5 % 500MG/250
2.5-2 INTRAVENOUS SOLUTION INTRAVENOUS CONTINUOUS
Status: DISCONTINUED | OUTPATIENT
Start: 2020-02-19 | End: 2020-02-26

## 2020-02-19 RX ORDER — FENTANYL CITRATE 50 UG/ML
100 INJECTION, SOLUTION INTRAMUSCULAR; INTRAVENOUS ONCE
Status: COMPLETED | OUTPATIENT
Start: 2020-02-19 | End: 2020-02-19

## 2020-02-19 RX ADMIN — HUMAN INSULIN 7 UNITS/HR: 100 INJECTION, SOLUTION SUBCUTANEOUS at 06:32

## 2020-02-19 RX ADMIN — AMIODARONE HYDROCHLORIDE 400 MG: 200 TABLET ORAL at 07:41

## 2020-02-19 RX ADMIN — DICLOFENAC 4 G: 10 GEL TOPICAL at 15:34

## 2020-02-19 RX ADMIN — VANCOMYCIN HYDROCHLORIDE 1500 MG: 10 INJECTION, POWDER, LYOPHILIZED, FOR SOLUTION INTRAVENOUS at 18:12

## 2020-02-19 RX ADMIN — Medication 50 MCG/HR: at 00:50

## 2020-02-19 RX ADMIN — SENNOSIDES AND DOCUSATE SODIUM 1 TABLET: 8.6; 5 TABLET ORAL at 20:28

## 2020-02-19 RX ADMIN — DOBUTAMINE 7.5 MCG/KG/MIN: 12.5 INJECTION, SOLUTION INTRAVENOUS at 01:23

## 2020-02-19 RX ADMIN — SENNOSIDES AND DOCUSATE SODIUM 2 TABLET: 8.6; 5 TABLET ORAL at 07:41

## 2020-02-19 RX ADMIN — CEFTRIAXONE 2 G: 2 INJECTION, POWDER, FOR SOLUTION INTRAMUSCULAR; INTRAVENOUS at 22:30

## 2020-02-19 RX ADMIN — DOBUTAMINE 7.5 MCG/KG/MIN: 12.5 INJECTION, SOLUTION INTRAVENOUS at 00:00

## 2020-02-19 RX ADMIN — ACETAMINOPHEN 650 MG: 325 TABLET, FILM COATED ORAL at 09:18

## 2020-02-19 RX ADMIN — CEFTRIAXONE 2 G: 2 INJECTION, POWDER, FOR SOLUTION INTRAMUSCULAR; INTRAVENOUS at 10:37

## 2020-02-19 RX ADMIN — EPINEPHRINE 0.1 MCG/KG/MIN: 1 INJECTION PARENTERAL at 03:05

## 2020-02-19 RX ADMIN — PANTOPRAZOLE SODIUM 40 MG: 40 INJECTION, POWDER, FOR SOLUTION INTRAVENOUS at 07:53

## 2020-02-19 RX ADMIN — PROPOFOL 35 MCG/KG/MIN: 10 INJECTION, EMULSION INTRAVENOUS at 22:08

## 2020-02-19 RX ADMIN — PROPOFOL 35 MCG/KG/MIN: 10 INJECTION, EMULSION INTRAVENOUS at 13:58

## 2020-02-19 RX ADMIN — Medication 0.08 MCG/KG/MIN: at 00:19

## 2020-02-19 RX ADMIN — DOBUTAMINE 7.5 MCG/KG/MIN: 12.5 INJECTION, SOLUTION INTRAVENOUS at 12:27

## 2020-02-19 RX ADMIN — AMPICILLIN SODIUM 2 G: 2 INJECTION, POWDER, FOR SOLUTION INTRAMUSCULAR; INTRAVENOUS at 07:53

## 2020-02-19 RX ADMIN — MAGNESIUM OXIDE 400 MG: 400 TABLET ORAL at 07:41

## 2020-02-19 RX ADMIN — HUMAN INSULIN 3 UNITS/HR: 100 INJECTION, SOLUTION SUBCUTANEOUS at 00:18

## 2020-02-19 RX ADMIN — VASOPRESSIN 2 UNITS/HR: 20 INJECTION INTRAVENOUS at 00:21

## 2020-02-19 RX ADMIN — FLUCONAZOLE 200 MG: 2 INJECTION, SOLUTION INTRAVENOUS at 18:12

## 2020-02-19 RX ADMIN — DICLOFENAC 4 G: 10 GEL TOPICAL at 08:18

## 2020-02-19 RX ADMIN — AMPICILLIN SODIUM 2 G: 2 INJECTION, POWDER, FOR SOLUTION INTRAMUSCULAR; INTRAVENOUS at 13:41

## 2020-02-19 RX ADMIN — RIFAMPIN 600 MG: 600 INJECTION, POWDER, LYOPHILIZED, FOR SOLUTION INTRAVENOUS at 17:19

## 2020-02-19 RX ADMIN — ASPIRIN 81 MG CHEWABLE TABLET 81 MG: 81 TABLET CHEWABLE at 07:41

## 2020-02-19 RX ADMIN — ALBUMIN HUMAN 500 ML: 0.05 INJECTION, SOLUTION INTRAVENOUS at 01:21

## 2020-02-19 RX ADMIN — EPINEPHRINE 0.08 MCG/KG/MIN: 1 INJECTION PARENTERAL at 00:18

## 2020-02-19 RX ADMIN — POTASSIUM CHLORIDE 20 MEQ: 29.8 INJECTION, SOLUTION INTRAVENOUS at 12:37

## 2020-02-19 RX ADMIN — POTASSIUM CHLORIDE 20 MEQ: 1.5 POWDER, FOR SOLUTION ORAL at 17:21

## 2020-02-19 RX ADMIN — MUPIROCIN 1 G: 20 OINTMENT TOPICAL at 01:23

## 2020-02-19 RX ADMIN — VASOPRESSIN 2 UNITS/HR: 20 INJECTION INTRAVENOUS at 01:27

## 2020-02-19 RX ADMIN — MUPIROCIN 1 G: 20 OINTMENT TOPICAL at 20:28

## 2020-02-19 RX ADMIN — PROPOFOL 25 MCG/KG/MIN: 10 INJECTION, EMULSION INTRAVENOUS at 10:25

## 2020-02-19 RX ADMIN — DICLOFENAC 4 G: 10 GEL TOPICAL at 20:28

## 2020-02-19 RX ADMIN — DICLOFENAC 4 G: 10 GEL TOPICAL at 12:23

## 2020-02-19 RX ADMIN — FENTANYL CITRATE 50 MCG: 50 INJECTION, SOLUTION INTRAMUSCULAR; INTRAVENOUS at 10:20

## 2020-02-19 RX ADMIN — PROPOFOL 30 MCG/KG/MIN: 10 INJECTION, EMULSION INTRAVENOUS at 00:20

## 2020-02-19 RX ADMIN — EPINEPHRINE 0.08 MCG/KG/MIN: 1 INJECTION PARENTERAL at 10:42

## 2020-02-19 RX ADMIN — ALBUMIN (HUMAN): 12.5 SOLUTION INTRAVENOUS at 04:43

## 2020-02-19 RX ADMIN — MUPIROCIN 1 G: 20 OINTMENT TOPICAL at 07:53

## 2020-02-19 RX ADMIN — ATORVASTATIN CALCIUM 20 MG: 20 TABLET, FILM COATED ORAL at 20:28

## 2020-02-19 RX ADMIN — PROPOFOL 35 MCG/KG/MIN: 10 INJECTION, EMULSION INTRAVENOUS at 18:27

## 2020-02-19 RX ADMIN — CALCIUM GLUCONATE 1 G: 98 INJECTION, SOLUTION INTRAVENOUS at 13:59

## 2020-02-19 RX ADMIN — LEVOFLOXACIN 500 MG: 5 INJECTION, SOLUTION INTRAVENOUS at 16:43

## 2020-02-19 RX ADMIN — Medication 150 MCG/HR: at 20:54

## 2020-02-19 RX ADMIN — FLUOXETINE 20 MG: 20 CAPSULE ORAL at 07:41

## 2020-02-19 RX ADMIN — POTASSIUM CHLORIDE 20 MEQ: 29.8 INJECTION, SOLUTION INTRAVENOUS at 01:21

## 2020-02-19 RX ADMIN — POTASSIUM CHLORIDE 20 MEQ: 29.8 INJECTION, SOLUTION INTRAVENOUS at 20:51

## 2020-02-19 RX ADMIN — ALLOPURINOL 200 MG: 100 TABLET ORAL at 07:41

## 2020-02-19 RX ADMIN — MAGNESIUM SULFATE 2 G: 2 INJECTION INTRAVENOUS at 13:28

## 2020-02-19 RX ADMIN — PROPOFOL 30 MCG/KG/MIN: 10 INJECTION, EMULSION INTRAVENOUS at 03:07

## 2020-02-19 RX ADMIN — THIAMINE HCL TAB 100 MG 100 MG: 100 TAB at 07:41

## 2020-02-19 RX ADMIN — AMPICILLIN SODIUM 2 G: 2 INJECTION, POWDER, FOR SOLUTION INTRAMUSCULAR; INTRAVENOUS at 20:28

## 2020-02-19 RX ADMIN — VANCOMYCIN HYDROCHLORIDE 1500 MG: 10 INJECTION, POWDER, LYOPHILIZED, FOR SOLUTION INTRAVENOUS at 06:41

## 2020-02-19 RX ADMIN — HUMAN INSULIN 4 UNITS/HR: 100 INJECTION, SOLUTION SUBCUTANEOUS at 15:39

## 2020-02-19 ASSESSMENT — MIFFLIN-ST. JEOR: SCORE: 1741.63

## 2020-02-19 ASSESSMENT — ACTIVITIES OF DAILY LIVING (ADL)
ADLS_ACUITY_SCORE: 21
ADLS_ACUITY_SCORE: 21
ADLS_ACUITY_SCORE: 18
ADLS_ACUITY_SCORE: 21
ADLS_ACUITY_SCORE: 16
ADLS_ACUITY_SCORE: 19

## 2020-02-19 NOTE — ANESTHESIA PROCEDURE NOTES
Central Line Procedure Note  Staff:     Anesthesiologist:  Antonette Rendon MD  Location: In OR after induction  Procedure Start/Stop Times:     patient identified, IV checked, risks and benefits discussed, informed consent, monitors and equipment checked, pre-op evaluation and at physician/surgeon's request      Correct Patient: Yes      Correct Position: Yes      Correct Site: Yes      Correct Procedure: Yes      Correct Laterality:  Yes    Site Marked:  No  Line Placement:     Procedure:  Central Line    Insertion laterality:  Right    Insertion site:  Internal Jugular    Position:  Trendelenburg      Maximal Sterile Barriers: no medical exclusion documented for following all elements of maximal sterile barrier technique         Injection Technique:  Ultrasound guided    Sterile Ultrasound Technique:  Sterile probe cover and Sterile gel    Vein evaluated via U/S for patency/adequacy of catheter insertion and is adequate.  Using realtime U/S imaging the vein was punctured, and needle was observed entering vein on U/S      A permanent image is NOT entered into the patient's record.      Local skin infiltration:  None    Catheter size:  9 Fr, 2 lumen 11.5 cm (MAC)    Cath secured with: suture      Dressing:  Tegaderm    Complications:  None obvious    Blood aspirated all lumens: Yes      All Lumens Flushed: Yes

## 2020-02-19 NOTE — PLAN OF CARE
D;Pt returned from O.R about 2345 last nite after reopen chest b/o bleeding episode.Post Hgb 8.2 then stripped CT for clots 6.4.given 2u PRBC per ordered.Since returned given total 1000cc 5% albumin for dropped Vad flow,PI and BP's with turn and suctionning.CT drain total 1500 bleed since return.CVP 8-10.See CCO monitors CI and JOSE figurations.After 1u PC 7.4 then given #2nd PRBC.Mediastinum wound vac dressing dry.CT dressing changed x2.UO 30-70cc/hr.  I;A;Post op bleeding continue.Fluctuating fluid status.  P;Continue to assess fluids status.Check Hgb,Coags to ptrevent further bleeding.

## 2020-02-19 NOTE — OP NOTE
OPERATIVE DATE: February 18, 2020    PRE-OPERATIVE DIAGNOSIS:  1) Ischemic cardiomyopathy and heart failure  Patient Active Problem List   Diagnosis     Acute on chronic systolic congestive heart failure (H)     Anxiety     CKD (chronic kidney disease) stage 3, GFR 30-59 ml/min (H)     Coronary artery disease involving native coronary artery of native heart without angina pectoris     GERD (gastroesophageal reflux disease)     Gout     Hyperlipidemia with target LDL less than 100     Nonischemic cardiomyopathy (H)     Non-nephrotic range proteinuria     ABHINAV on CPAP     Type 2 diabetes mellitus with stage 3 chronic kidney disease, without long-term current use of insulin (H)     Heart failure (H)     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)     POST-OPERATIVE DIAGNOSIS:  1) Ischemic cardiomyopathy and heart failure  Patient Active Problem List   Diagnosis     Acute on chronic systolic congestive heart failure (H)     Anxiety     CKD (chronic kidney disease) stage 3, GFR 30-59 ml/min (H)     Coronary artery disease involving native coronary artery of native heart without angina pectoris     GERD (gastroesophageal reflux disease)     Gout     Hyperlipidemia with target LDL less than 100     Nonischemic cardiomyopathy (H)     Non-nephrotic range proteinuria     ABHINAV on CPAP     Type 2 diabetes mellitus with stage 3 chronic kidney disease, without long-term current use of insulin (H)     Heart failure (H)     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)     PROCEDURE:  1) Median sternotomy  2) Implant of HM3 left ventricular assist device (intracorporeal left ventricular assist device)  3) Transesophageal echocardiography  4) Re-exploration for bleeding    SURGEON: Mac Jaramillo MD    COSURGEON: Jamil Apodaca MD    ASSISTANT: Madhuri Fry MD    ANESTHESIA: GETA    ESTIMATED BLOOD LOSS: 1000cc    OPERATIVE FINDINGS:  1) Right ventricular function decreased  2) No tricuspid regurgitation  3)  No aortic insufficiency  4) No patent foramen ovale    CARDIOPULMONARY BYPASS TIME: 58 minutes    INDICATIONS:  Mr. Eliseo Tanner is a 64 year old male admitted with ischemic cardiomyopathy, heart failure.  We were asked to evaluate for LVAD implant.  Risks and benefits of the operation were explained to the patient and their family including, but not limited to, bleeding, infection, stroke and even death.  They understood these risks and agreed to proceed electively.    OPERATIVE REPORT:  The patient was transferred to the operating room and positioned supine on the OR table.  General anesthesia was initiated by the anesthesia team.  Endotracheal intubation and central venous access was performed by anesthesia.  The patients neck, chest, abdomen and bilateral lower extremities were clipped, prepped and draped in sterile fashion.  A pre-procedure time-out was performed confirming the correct patient, correct site and correct procedure.    A median sternotomy was performed using the reciprocating saw.  The previous sternal wires were removed.  The patient was given 400 mg/kg IV heparin.  Pledgeted 2-0 ethibond pursestring was made in the ascending aorta.  A 20F EOPA arterial cannula was placed here and connected to the bypass circuit.  A 2-0 ethibond pursestring was made in the right atrial appendage.  A 32/40F two stage venous cannula was placed here and connected to the cardiopulmonary bypass circuit.  Cardiopulmonary bypass was initiated with good flows.   The heart was elevated and the left ventricular apex was exposed using lap sponges.  The HM3 sewing cuff was sutured to the left ventricular apex using a modified sew then cut technique.  An apical  core was made using the coring knife.  Pledgeted 2-0 tevdek stitches were placed circumferentially.  The stitches were tied to attach the sewing cuff.  Next the HM3 left ventricular assist device pump was placed.  The driveline was tunneled out the right upper  quadrant.  Next, the outflow graft was cut to length.  A partially occluding aortic clamp was applied.  The outflow graft was anastomosed to the ascending aorta in end-to-side fashion using running 4-0 prolene.  The outflow graft was deaired.  The patient was weaned from cardiopulmonary bypass.  There was diminished RV function.  We had to resume cardiopulmonary bypass temporarily.  Drips were optimized and nitric oxide was started.  We weaned from CPB again.  Heparin was reversed with protamine.  The patient was coagulopathic.  The sternal retractor was removed.  Two straight argyle mediastinal chest tubes were placed and bilateral pleural chest tubes were placed.   A 24F pump pocket drain was placed.  These were delivered through the anterior abdominal wall and secured to the skin with 0-ethibond stitches.  The outflow graft and driveline were wrapped with GoreTex graft.  The wound was made hemostatic with cautery.  The subcutaneous tissue, sternal edges, thymic fat, pericardial edges and all anastomotic and cannulation sites were inspected and hemostatic.  The wound was irrigated with warm antibiotic saline.  The sternum was reapproximated with sternal wires.  The wound was made hemostatic then closed in layers using vicryl stitches.  The subcutaneous tissue was closed with 2-0 vicryl stitches.  The skin was closed with 3-0 vicryl.  Dermabond skin glue was applied.  A sterile dressing was applied.      During preparation for transport there was a considerable amount of chest tube output.  I decided to reopen the chest.  There was active bleeding from two sternal wire sites.  This was controlled with stitch.  I decided to reclose the chest.  The sternum was approximated with sternal wires.  The wound was closed in layers using vicryl stitches.  A skin vac was applied.    The patient was then transferred from the operating bed to an ICU bed and transferred to the ICU in critical, but stable, condition.    All  needle, sponge and instrument counts were correct at the end of the case.    Mac Jaramillo  Cardiothoracic Surgery  Pager: [phone]

## 2020-02-19 NOTE — ANESTHESIA PROCEDURE NOTES
Perioperative CHAMP Report  Anesthesia Information  CHAMP probe placed and report generated by: : Antonette Rendon MD Lanigan, Megan Jane, MD  Images for this study have been archived.         Preanesthesia Checklist:  Patient identified, IV assessed, risks and benefits discussed, monitors and equipment assessed, procedure being performed at surgeon's request and anesthesia consent obtained.  General Procedure Information  Modalities:  2D, CW Doppler and Color flow mapping  Diagnostic indications for CHAMP:   Non-ischemic cardiomyopathy                          .    Echocardiographic and Doppler Measurements  Right Ventricle:  Cavity size dilated.   Hypertrophy not present.   Thrombus not present.    Global function moderately impaired.     Left Ventricle:  Cavity size dilated.   Hypertrophy not present.   Thrombus not present.   Global Function severely impaired.       Ventricular Regional Function:  1- Basal Anteroseptal:  hypokinetic  2- Basal Anterior:  hypokinetic  3- Basal Anterolateral:  hypokinetic  4- Basal Inferolateral:  hypokinetic  5- Basal Inferior:  hypokinetic  6- Basal Inferoseptal:  hypokinetic  7- Mid Anteroseptal:  hypokinetic  8- Mid Anterior:  hypokinetic  9- Mid Anterolateral:  hypokinetic  10- Mid Inferolateral:  hypokinetic  11- Mid Inferior:  akinetic  12- Mid Inferoseptal:  hypokinetic  13- Apical Anterior:  hypokinetic  14- Apical Lateral:  hypokinetic  15- Apical Inferior:  hypokinetic  16- Apical Septal:  hypokinetic  17- [place]:  hypokinetic    Valves  Aortic Valve: Annulus normal.  Stenosis not present.  Regurgitation +1.  Leaflets normal.  Leaflet motions normal.    Mitral Valve: Annulus normal.  Stenosis not present.  Regurgitation +3.  Leaflets normal.  Leaflet motions normal.    Tricuspid Valve: Annulus normal.  Stenosis not present.  Regurgitation +1.  Leaflets normal.  Leaflet motions normal.    Pulmonic Valve: Annulus normal.  Stenosis not present.  Regurgitation +1.       Aorta: Ascending Aorta: Size normal.  Dissection not present.  Plaque thickness less than 3 mm.  Mobile plaque not present.    Aortic Arch: Size normal.   Dissection not present.   Plaque thickness less than 3 mm.   Mobile plaque not present.    Descending Aorta: Size normal.   Dissection not present.   Plaque thickness less than 3 mm.   Mobile plaque not present.        Right Atrium:  Spontaneous echo contrast not present.   Thrombus not present.   Tumor not present.   Device not present.     Left Atrium: Spontaneous echo contrast not present.  Thrombus not present.  Tumor not present.  Device not present.    Left atrial appendage normal.     Atrial Septum: Intra-atrial septal morphology normal.     Ventricular Septum: Intra-ventricular septum morphology normal.       Other Findings:   Pericardium:  pericardial effusion.  Pleural Effusion:  left. .  .  .  .  Post Intervention Findings  Procedure(s) performed:  LVAD Placement. Global function:  Unchanged.   Regional wall motion:  Unchanged   Surgeon(s) notified of all postintervention findings:  Yes  .  .  .   .  .  .  .  .  .  LVAD Placement:  LV decompressed.  PFO not present.  Aortic valve not opening.    On initial separation from CPB had severe RV dilation and dysfunction with LV suck down.  Resumed CPB, increased inotropes and vasopressors, started Jeramy with improvement in RV function.  Continued to have intermittent suck down events.      Echocardiogram Comments

## 2020-02-19 NOTE — PROGRESS NOTES
CLINICAL NUTRITION SERVICES - BRIEF NOTE    Received provider consult for nutrition education with comments post op cardiovascular surgery (automatic consult on post-op order set). S/p LVAD on 2/18. Nutrition education completed on 2/8.     RD will continue to follow per protocol or if re-consulted.     .Marisela Bhandari RD, LD  [id number]  Pgr: [phone]

## 2020-02-19 NOTE — PROGRESS NOTES
Patient in OR for HM3 implant. Pump prepped by Antonette Correia, VAD Coordinator and started by Antonette Correia, VAD Coordinator. VAD speed titrated up in collaboration with surgeon, anesthesia, and perfusion. Report was given to 4E RN upon arrival to the unit.       Parameters when leaving OR:  o Speed: 5200 rpm  o PI (or flow waveform peak/trough for HW): 4.8  o Flow: 3.5 lpm  o Power: 3.7 muhammad    Flow range at set speed: 2.8-3.5 lpm                                               PI range at set speed: 3-6.5    Factors noted to cause a significant variation in VAD parameters: MAP >85, <65 (pt most stable between 70-80), low CVP (less than 6)    Other significant events: pt requiring significant amount of volume and products to maintain adequate flow. Speeds titrated several times between [id number] per surgeon to allow for flow >2.5 in setting of hypovolemia and right heart failure. Pt had frequent suction events witnessed on CHAMP by anesthesia staff.     Please page the VAD Coordinator on-call with any VAD related questions (* * [phone], job code [phone] from an internal line).     Antonette Correia RN      Normal

## 2020-02-19 NOTE — PROGRESS NOTES
Pt came from OR intubated 8.0 ETT 24@teeth. Pt put on vent settings per MD order. ETT secured. Labs and Xray pending. Will continue to follow

## 2020-02-19 NOTE — PROGRESS NOTES
Advanced Heart Failure Consult Progress Note  Eliseo Tanner MRN: [id number]  Age: 64 year old, : 1955  Date: 2020              Assessment and Plan:     Mr Eliseo Tanner is a 63yo M w/PMHx notable for chronic systolic heart failure, NICM, moderate CAD, HTN, ABHINAV on CPAP, DM2, CKDIII who is transferred to [facility] for evaluation and management of advanced heart failure with arrhythmia now s/p HM 3 on .    Today's plan ()  -Monitor Hgb, chest tube output and transfusion need and need to re-intervention  -Volume resuscitation with albumin if no products are needed  -Wean pressors as tolerated for MAPs >65 mmHg     Moderate CAD  Severe Mitral regurgitation  Chronic nonischemic systolic heart failure w/ reduced EF (15-20%) and exacerbation  NYHA Class III. Echo 2020: LVEF 15-20%; LVEDd 5.9 cm; 4+ MR. [facility] 2019 w/ nonobstructive CAD w/ severe branch disease. Presented with increased wt gain, SOB, LE edema concerning for HF exacerbation, initially concerning for cardiogenic shock. Admitted to [facility] for further evaluation regarding need for advanced HF therapies. Patient is now s/p HM III placement on  with early post-op course complicated by bleeding that is slowing down as of  AM. Chest tube output has decreased from about 200 to 100 ml/hr.  -Trend Hbg q6h and transfuse for Hgb <7.0  -Monitor fibrinogen, INR, platelets  -If no further blood products needed, would give albumin/cystalloid in 250 ml boluses in order to increase filling pressures and wean off pressors  -Attempt to wean off pressors: NE, then vasopressin first  -Continue ASA 81, atorvastatin 20 mg    Proteus and Enterococcus bacteremia  Organisms growing from 2/2 blood cultures from . Blood cultured from  NGTD and 2/15 growing  S.epi, likely contaminant per ID.  ID consulted, appreciate help.  -daily blood cultures until negative  -Zosyn (-)  -Tobramycin (-)  -Vancomycin  "(2/13-2/17  -Meropenem (2/14-2/15)  -Ceftriaxone (2/16-  -Ampicillin (2/16-    Precapillary pulmonary hypertension:  Significant transpulmonary gradient of 14 on right heart cath numbers. May be related to CTEPH vs. WHO I.    #Thrombocytopenia:  Concern for HIT given timing with respect to heparin exposure. HIT positive DAVINA negative. Antibody is now negative x2, thus no further indication for PLAX  -Heme consulted  -Bivalirudin gtt after LVAD surgery once bleeding has subsided    VFib requiring shock  Shocked x 3 prior to transfer, loaded with amio. No known prior history. Suspect related to underlying HF.   -Keep K>4, Mg>2  -Amio 400 mg PO QD  -Need ICD for secondary prevention     Chronic:  ABHINAV: Home CPAP  Gout: PTA allopurinol 200 daily  MDD: PTA fluoxetine  GERD: PTA PPI  COPD: albuterol PRN    FEN:  2gm Na   2L water restriction    PPX: Holding given post-op bleeding    CODE: FULL CODE     Stanford Acuna M.D.  Cardiology fellow             Subjective/Interval Events     Yesterday, patient went to OR for HM III placement. During preparation for transport there was a significant amount of chest tube output and so chest was re-opened and bleeding was controlled with stitch.    This AM, chest tubes continued to have output of about 150-200 ml/hr, which has decreased to 100 ml/hr. With elevation of chest tubes, there was no visible ozzing around the chest tubes from around the incision site.          Objective     BP (!) 72/53   Pulse 98   Temp 100.2  F (37.9  C) (Pulmonary Artery)   Resp 14   Ht 1.702 m (5' 7\")   Wt 100.3 kg (221 lb 1.9 oz)   SpO2 100%   BMI 34.63 kg/m    Temp:  [97.9  F (36.6  C)-100.2  F (37.9  C)] 100.2  F (37.9  C)  Pulse:  [98] 98  Heart Rate:  [] 95  Resp:  [13-26] 14  BP: (72)/(53) 72/53  MAP:  [58 mmHg-238 mmHg] 73 mmHg  Arterial Line BP: ()/() 77/51  FiO2 (%):  [40 %-100 %] 40 %  SpO2:  [93 %-100 %] 100 %  Wt Readings from Last 2 Encounters:   02/19/20 100.3 kg (221 " lb 1.9 oz)     I/O last 3 completed shifts:  In: 59649.89 [I.V.:2380.89; Other:895]  Out: 3335 [Urine:1725; Chest Tube:1610]      Gen: No acute distress, but intubated and sedated  HEENT: NC/AT  PULM/THORAX: mechanical breath sounds, but no wheezes; chest tubes in place with minimal oozing from surgical site  CV: normal rate, regular rhythm, normal S1 and S2, LVAD hum  ABD: Soft, NTND, bowel sounds present, no masses  EXT: WWP. No LE edema, clubbing or cyanosis.  NEURO: sedated                Data:     Recent Results (from the past 24 hour(s))   Glucose by meter    Collection Time: 02/18/20 12:25 PM   Result Value Ref Range    Glucose 106 (H) 70 - 99 mg/dL   Blood gas venous with oxyhemoglobin (PCU Collect TBD)    Collection Time: 02/18/20 12:52 PM   Result Value Ref Range    Ph Venous 7.48 (H) 7.32 - 7.43 pH    PCO2 Venous 29 (L) 40 - 50 mm Hg    PO2 Venous 38 25 - 47 mm Hg    Bicarbonate Venous 21 21 - 28 mmol/L    FIO2 21.0     Oxyhemoglobin Venous 71 %    Base Deficit Venous 1.8 mmol/L   Plasma prepare order unit    Collection Time: 02/18/20  3:32 PM   Result Value Ref Range    Blood Component Type Plasma     Units Ordered 4    Blood component    Collection Time: 02/18/20  3:32 PM   Result Value Ref Range    Unit Number [id number]     Blood Component Type Apheresis Plasma Thawed     Division Number 00     Status of Unit No longer available 02/19/2020 0039     Blood Product Code U8354W79     Unit Status RET    Blood component    Collection Time: 02/18/20  3:32 PM   Result Value Ref Range    Unit Number [id number]     Blood Component Type Apheresis Plasma Thawed     Division Number 00     Status of Unit Released to care unit     Blood Product Code H0983T56     Unit Status ISS    Blood component    Collection Time: 02/18/20  3:32 PM   Result Value Ref Range    Unit Number [id number]     Blood Component Type Plasma, Thawed     Division Number 00     Status of Unit Released to care unit     Blood Product  Code U3391W39     Unit Status ISS    Blood component    Collection Time: 02/18/20  3:32 PM   Result Value Ref Range    Unit Number [id number]     Blood Component Type Apheresis Plasma Thawed     Division Number 00     Status of Unit Released to care unit     Blood Product Code G5776Q55     Unit Status ISS    TEG without Heparinase Native    Collection Time: 02/18/20  5:39 PM   Result Value Ref Range    R time until clot forms 6.3 4 - 9 Min    K time to spec clot strength 1.2 1 - 3 Min    Angle rate of clot growth 73.4 59 - 74 Deg    MA maximum clot strength 55.7 55 - 74 mm    CI hypercoagulation index 0 0.0 - 3.0 Ratio    G actual clot strength 6.3 5.3 - 13.2 Kd/sc    LY30 lysis at 30 minutes 1 0 - 8 %    LY60 lysis at 60 minutes 5.0 0 - 15 %   Arterial Panel    Collection Time: 02/18/20  5:42 PM   Result Value Ref Range    pH Arterial 7.54 (H) 7.35 - 7.45 pH    pCO2 Arterial 40 35 - 45 mm Hg    pO2 Arterial 104 80 - 105 mm Hg    Bicarbonate Arterial 34 (H) 21 - 28 mmol/L    Base Excess Art 10.4 mmol/L    FIO2 80.0     Sodium 143 133 - 144 mmol/L    Potassium 3.4 3.4 - 5.3 mmol/L    Hemoglobin 11.6 (L) 13.3 - 17.7 g/dL    Glucose 100 (H) 70 - 99 mg/dL    Calcium Ionized Whole Blood 4.5 4.4 - 5.2 mg/dL   Lactic acid whole blood    Collection Time: 02/18/20  5:42 PM   Result Value Ref Range    Lactic Acid 0.5 (L) 0.7 - 2.0 mmol/L   Platelets prepare order unit    Collection Time: 02/18/20  6:25 PM   Result Value Ref Range    Blood Component Type PLT Pheresis     Units Ordered 2    Blood component    Collection Time: 02/18/20  6:25 PM   Result Value Ref Range    Unit Number [id number]     Blood Component Type PlateletPheresis LeukoReduced Irradiated     Division Number 00     Status of Unit No longer available 02/19/2020 0035     Blood Product Code Y8986Y94     Unit Status RET    Blood component    Collection Time: 02/18/20  6:25 PM   Result Value Ref Range    Unit Number [id number]     Blood Component Type  PlateletPheresis LeukoReduced Irradiated     Division Number 00     Status of Unit Released to care unit     Blood Product Code M1580Z78     Unit Status ISS    CBC with platelets    Collection Time: 02/18/20  7:36 PM   Result Value Ref Range    WBC 31.8 (H) 4.0 - 11.0 10e9/L    RBC Count 3.09 (L) 4.4 - 5.9 10e12/L    Hemoglobin 8.8 (L) 13.3 - 17.7 g/dL    Hematocrit 27.7 (L) 40.0 - 53.0 %    MCV 90 78 - 100 fl    MCH 28.5 26.5 - 33.0 pg    MCHC 31.8 31.5 - 36.5 g/dL    RDW 18.2 (H) 10.0 - 15.0 %    Platelet Count 168 150 - 450 10e9/L   Fibrinogen activity    Collection Time: 02/18/20  7:36 PM   Result Value Ref Range    Fibrinogen 194 (L) 200 - 420 mg/dL   INR    Collection Time: 02/18/20  7:36 PM   Result Value Ref Range    INR 1.76 (H) 0.86 - 1.14   Partial thromboplastin time    Collection Time: 02/18/20  7:36 PM   Result Value Ref Range    PTT 35 22 - 37 sec   TEG without Heparinase Native    Collection Time: 02/18/20  7:41 PM   Result Value Ref Range    R time until clot forms 6.0 4 - 9 Min    K time to spec clot strength 1.4 1 - 3 Min    Angle rate of clot growth 70.3 59 - 74 Deg    MA maximum clot strength 52.3 (L) 55 - 74 mm    CI hypocoagulation index 0.5 0.0 - 3.0 Ratio    G actual clot strength 5.5 5.3 - 13.2 Kd/sc    LY30 lysis at 30 minutes 4 0 - 8 %    LY60 lysis at 60 minutes 9.3 0 - 15 %   TEG with Heparinase Native    Collection Time: 02/18/20  7:41 PM   Result Value Ref Range    R time until clot forms 5.9 4 - 9 Min    K time to spec clot strength 1.3 1 - 3 Min    Angle rate of clot growth 70.8 59 - 74 Deg    MA maximum clot strength 64.2 55 - 74 mm    CI hypercoagulation index 1.1 0.0 - 3.0 Ratio    G actual clot strength 9.0 5.3 - 13.2 Kd/sc    LY30 lysis at 30 minutes 1 0 - 8 %    LY60 lysis at 60 minutes 4.0 0 - 15 %   Arterial Panel    Collection Time: 02/18/20  7:47 PM   Result Value Ref Range    pH Arterial 7.46 (H) 7.35 - 7.45 pH    pCO2 Arterial 40 35 - 45 mm Hg    pO2 Arterial 510 (H) 80  - 105 mm Hg    Bicarbonate Arterial 29 (H) 21 - 28 mmol/L    Base Excess Art 4.6 mmol/L    FIO2 100.0     Sodium 142 133 - 144 mmol/L    Potassium 3.6 3.4 - 5.3 mmol/L    Hemoglobin 9.1 (L) 13.3 - 17.7 g/dL    Glucose 285 (H) 70 - 99 mg/dL    Calcium Ionized Whole Blood 4.6 4.4 - 5.2 mg/dL   Lactic acid whole blood    Collection Time: 02/18/20  7:47 PM   Result Value Ref Range    Lactic Acid 3.6 (H) 0.7 - 2.0 mmol/L   Arterial Panel    Collection Time: 02/18/20  8:37 PM   Result Value Ref Range    pH Arterial 7.39 7.35 - 7.45 pH    pCO2 Arterial 46 (H) 35 - 45 mm Hg    pO2 Arterial 240 (H) 80 - 105 mm Hg    Bicarbonate Arterial 28 21 - 28 mmol/L    Base Excess Art 2.7 mmol/L    FIO2 100.0     Sodium 141 133 - 144 mmol/L    Potassium 3.4 3.4 - 5.3 mmol/L    Hemoglobin 11.0 (L) 13.3 - 17.7 g/dL    Glucose 265 (H) 70 - 99 mg/dL    Calcium Ionized Whole Blood 4.3 (L) 4.4 - 5.2 mg/dL   Lactic acid whole blood    Collection Time: 02/18/20  8:37 PM   Result Value Ref Range    Lactic Acid 3.2 (H) 0.7 - 2.0 mmol/L   Arterial Panel    Collection Time: 02/18/20 10:08 PM   Result Value Ref Range    pH Arterial 7.43 7.35 - 7.45 pH    pCO2 Arterial 43 35 - 45 mm Hg    pO2 Arterial 419 (H) 80 - 105 mm Hg    Bicarbonate Arterial 28 21 - 28 mmol/L    Base Excess Art 3.4 mmol/L    FIO2 100.0     Sodium 143 133 - 144 mmol/L    Potassium 3.2 (L) 3.4 - 5.3 mmol/L    Hemoglobin 6.4 (LL) 13.3 - 17.7 g/dL    Glucose 186 (H) 70 - 99 mg/dL    Calcium Ionized Whole Blood 3.1 (L) 4.4 - 5.2 mg/dL   Lactic acid whole blood    Collection Time: 02/18/20 10:08 PM   Result Value Ref Range    Lactic Acid 3.3 (H) 0.7 - 2.0 mmol/L   Cryoprecipitate prepare order unit    Collection Time: 02/18/20 10:45 PM   Result Value Ref Range    Blood Component Type Cryoprecipitate     Units Ordered 5    Blood component    Collection Time: 02/18/20 10:45 PM   Result Value Ref Range    Unit Number [id number]     Blood Component Type 5 cryoprecipitate units pooled      Division Number 00     Status of Unit Released to care unit     Blood Product Code [id number]     Unit Status ISS    Glucose by meter    Collection Time: 02/19/20 12:09 AM   Result Value Ref Range    Glucose 137 (H) 70 - 99 mg/dL   EKG 12-lead, tracing only    Collection Time: 02/19/20 12:11 AM   Result Value Ref Range    Interpretation ECG Click View Image link to view waveform and result    INR    Collection Time: 02/19/20 12:12 AM   Result Value Ref Range    INR 1.03 0.86 - 1.14   Partial thromboplastin time    Collection Time: 02/19/20 12:12 AM   Result Value Ref Range    PTT 43 (H) 22 - 37 sec   Blood gas arterial and oxyhgb    Collection Time: 02/19/20 12:12 AM   Result Value Ref Range    pH Arterial 7.42 7.35 - 7.45 pH    pCO2 Arterial 45 35 - 45 mm Hg    pO2 Arterial 372 (H) 80 - 105 mm Hg    Bicarbonate Arterial 29 (H) 21 - 28 mmol/L    Oxyhemoglobin Arterial 98 92 - 100 %    Base Excess Art 4.2 mmol/L   CBC with platelets    Collection Time: 02/19/20 12:12 AM   Result Value Ref Range    WBC 18.4 (H) 4.0 - 11.0 10e9/L    RBC Count 2.80 (L) 4.4 - 5.9 10e12/L    Hemoglobin 8.2 (L) 13.3 - 17.7 g/dL    Hematocrit 24.7 (L) 40.0 - 53.0 %    MCV 88 78 - 100 fl    MCH 29.3 26.5 - 33.0 pg    MCHC 33.2 31.5 - 36.5 g/dL    RDW 14.9 10.0 - 15.0 %    Platelet Count 127 (L) 150 - 450 10e9/L   Comprehensive metabolic panel    Collection Time: 02/19/20 12:12 AM   Result Value Ref Range    Sodium 146 (H) 133 - 144 mmol/L    Potassium 3.6 3.4 - 5.3 mmol/L    Chloride 108 94 - 109 mmol/L    Carbon Dioxide 29 20 - 32 mmol/L    Anion Gap 9 3 - 14 mmol/L    Glucose 146 (H) 70 - 99 mg/dL    Urea Nitrogen 28 7 - 30 mg/dL    Creatinine 1.39 (H) 0.66 - 1.25 mg/dL    GFR Estimate 52 (L) >60 mL/min/[1.73_m2]    GFR Estimate If Black 61 >60 mL/min/[1.73_m2]    Calcium 7.4 (L) 8.5 - 10.1 mg/dL    Bilirubin Total 1.3 0.2 - 1.3 mg/dL    Albumin 1.8 (L) 3.4 - 5.0 g/dL    Protein Total 3.5 (L) 6.8 - 8.8 g/dL    Alkaline Phosphatase 41 40 -  150 U/L    ALT 19 0 - 70 U/L    AST 46 (H) 0 - 45 U/L   Lactic acid whole blood    Collection Time: 02/19/20 12:12 AM   Result Value Ref Range    Lactic Acid 3.6 (H) 0.7 - 2.0 mmol/L   Calcium ionized whole blood    Collection Time: 02/19/20 12:12 AM   Result Value Ref Range    Calcium Ionized Whole Blood 4.3 (L) 4.4 - 5.2 mg/dL   Magnesium    Collection Time: 02/19/20 12:12 AM   Result Value Ref Range    Magnesium 1.9 1.6 - 2.3 mg/dL   Phosphorus    Collection Time: 02/19/20 12:12 AM   Result Value Ref Range    Phosphorus 4.0 2.5 - 4.5 mg/dL   Blood gas venous and oxyhgb    Collection Time: 02/19/20 12:12 AM   Result Value Ref Range    Ph Venous 7.36 7.32 - 7.43 pH    PCO2 Venous 54 (H) 40 - 50 mm Hg    PO2 Venous 31 25 - 47 mm Hg    Bicarbonate Venous 30 (H) 21 - 28 mmol/L    Oxyhemoglobin Venous 55 %    Base Excess Venous 4.2 mmol/L   Glucose by meter    Collection Time: 02/19/20  1:26 AM   Result Value Ref Range    Glucose 144 (H) 70 - 99 mg/dL   Glucose by meter    Collection Time: 02/19/20  2:00 AM   Result Value Ref Range    Glucose 154 (H) 70 - 99 mg/dL   Blood gas venous with oxyhemoglobin (AM Draw)    Collection Time: 02/19/20  3:03 AM   Result Value Ref Range    Ph Venous 7.39 7.32 - 7.43 pH    PCO2 Venous 49 40 - 50 mm Hg    PO2 Venous 27 25 - 47 mm Hg    Bicarbonate Venous 29 (H) 21 - 28 mmol/L    FIO2 70%     Oxyhemoglobin Venous 46 %    Base Excess Venous 3.8 mmol/L   CBC with platelets    Collection Time: 02/19/20  3:04 AM   Result Value Ref Range    WBC 12.5 (H) 4.0 - 11.0 10e9/L    RBC Count 2.15 (L) 4.4 - 5.9 10e12/L    Hemoglobin 6.4 (LL) 13.3 - 17.7 g/dL    Hematocrit 18.9 (L) 40.0 - 53.0 %    MCV 88 78 - 100 fl    MCH 29.8 26.5 - 33.0 pg    MCHC 33.9 31.5 - 36.5 g/dL    RDW 15.2 (H) 10.0 - 15.0 %    Platelet Count 129 (L) 150 - 450 10e9/L   Partial thromboplastin time    Collection Time: 02/19/20  3:04 AM   Result Value Ref Range    PTT 37 22 - 37 sec   Comprehensive metabolic panel     Collection Time: 02/19/20  3:04 AM   Result Value Ref Range    Sodium 143 133 - 144 mmol/L    Potassium 4.1 3.4 - 5.3 mmol/L    Chloride 106 94 - 109 mmol/L    Carbon Dioxide 30 20 - 32 mmol/L    Anion Gap 7 3 - 14 mmol/L    Glucose 158 (H) 70 - 99 mg/dL    Urea Nitrogen 31 (H) 7 - 30 mg/dL    Creatinine 1.55 (H) 0.66 - 1.25 mg/dL    GFR Estimate 46 (L) >60 mL/min/[1.73_m2]    GFR Estimate If Black 53 (L) >60 mL/min/[1.73_m2]    Calcium 7.6 (L) 8.5 - 10.1 mg/dL    Bilirubin Total 1.5 (H) 0.2 - 1.3 mg/dL    Albumin 2.9 (L) 3.4 - 5.0 g/dL    Protein Total 4.4 (L) 6.8 - 8.8 g/dL    Alkaline Phosphatase 36 (L) 40 - 150 U/L    ALT 19 0 - 70 U/L    AST 55 (H) 0 - 45 U/L   Magnesium    Collection Time: 02/19/20  3:04 AM   Result Value Ref Range    Magnesium 2.0 1.6 - 2.3 mg/dL   Calcium ionized whole blood    Collection Time: 02/19/20  3:04 AM   Result Value Ref Range    Calcium Ionized Whole Blood 4.2 (L) 4.4 - 5.2 mg/dL   Phosphorus    Collection Time: 02/19/20  3:04 AM   Result Value Ref Range    Phosphorus 4.4 2.5 - 4.5 mg/dL   Blood gas arterial and oxyhgb    Collection Time: 02/19/20  3:04 AM   Result Value Ref Range    pH Arterial 7.45 7.35 - 7.45 pH    pCO2 Arterial 42 35 - 45 mm Hg    pO2 Arterial 232 (H) 80 - 105 mm Hg    Bicarbonate Arterial 29 (H) 21 - 28 mmol/L    FIO2 70%     Oxyhemoglobin Arterial 98 92 - 100 %    Base Excess Art 4.6 mmol/L   Fibrinogen activity    Collection Time: 02/19/20  3:04 AM   Result Value Ref Range    Fibrinogen 171 (L) 200 - 420 mg/dL   INR    Collection Time: 02/19/20  3:04 AM   Result Value Ref Range    INR 1.24 (H) 0.86 - 1.14   Glucose by meter    Collection Time: 02/19/20  3:20 AM   Result Value Ref Range    Glucose 145 (H) 70 - 99 mg/dL   Glucose by meter    Collection Time: 02/19/20  4:15 AM   Result Value Ref Range    Glucose 149 (H) 70 - 99 mg/dL   CBC Differential (AM Draw)    Collection Time: 02/19/20  4:56 AM   Result Value Ref Range    WBC 11.5 (H) 4.0 - 11.0 10e9/L     RBC Count 2.58 (L) 4.4 - 5.9 10e12/L    Hemoglobin 7.6 (L) 13.3 - 17.7 g/dL    Hematocrit 22.4 (L) 40.0 - 53.0 %    MCV 87 78 - 100 fl    MCH 29.5 26.5 - 33.0 pg    MCHC 33.9 31.5 - 36.5 g/dL    RDW 14.8 10.0 - 15.0 %    Platelet Count 122 (L) 150 - 450 10e9/L    Diff Method Manual Differential     % Neutrophils 84.8 %    % Lymphocytes 9.8 %    % Monocytes 3.6 %    % Eosinophils 0.9 %    % Basophils 0.0 %    % Myelocytes 0.9 %    Absolute Neutrophil 9.8 (H) 1.6 - 8.3 10e9/L    Absolute Lymphocytes 1.1 0.8 - 5.3 10e9/L    Absolute Monocytes 0.4 0.0 - 1.3 10e9/L    Absolute Eosinophils 0.1 0.0 - 0.7 10e9/L    Absolute Basophils 0.0 0.0 - 0.2 10e9/L    Absolute Myelocytes 0.1 (H) 0 10e9/L    Poikilocytosis Slight     Polychromasia Slight     Target Cells Slight     Platelet Estimate Confirming automated cell count    Blood gas arterial    Collection Time: 20  4:59 AM   Result Value Ref Range    pH Arterial 7.46 (H) 7.35 - 7.45 pH    pCO2 Arterial 41 35 - 45 mm Hg    pO2 Arterial 186 (H) 80 - 105 mm Hg    Bicarbonate Arterial 29 (H) 21 - 28 mmol/L    Base Excess Art 4.7 mmol/L    FIO2 50    Glucose by meter    Collection Time: 20  5:28 AM   Result Value Ref Range    Glucose 151 (H) 70 - 99 mg/dL   Glucose by meter    Collection Time: 20  6:43 AM   Result Value Ref Range    Glucose 114 (H) 70 - 99 mg/dL   Cryoprecipitate prepare order unit    Collection Time: 20  7:49 AM   Result Value Ref Range    Blood Component Type Cryoprecipitate     Units Ordered 5    Glucose by meter    Collection Time: 20  8:10 AM   Result Value Ref Range    Glucose 145 (H) 70 - 99 mg/dL       Recent Results (from the past 24 hour(s))   Echocardiogram Complete    Narrative    [id number]  [id number]  [id number]  [id number]^MATT^NAHUN^JIM           [facility],[place]  Echocardiography Laboratory  [address] [place] [id number]     Name: WOLFGANG LEACH  MRN: [id number]  :  1955  Study Date: 02/07/2020 10:06 AM  Age: 64 yrs  Gender: Male  Patient Location: [facility]  Reason For Study: Heart Failure  Ordering Physician: NAHUN GUTIERREZ  Referring Physician: SELF, REFERRED  Performed By: Yvonne Adan RDCS     BSA: 2.2 m2  Height: 67 in  Weight: 244 lb  HR: 103  BP: 72/52 mmHg  _____________________________________________________________________________  __        Procedure  Complete Portable Echo Adult. Contrast Optison. Optison (NDC #6416-4919-34)  given intravenously. Patient was given 6 ml mixture of 3 ml Optison and 6 ml  saline. 3 ml wasted.  _____________________________________________________________________________  __        Interpretation Summary  Left ventricular size is normal.  LVEF 20% based on biplane 2D tracing.  Mild to moderate right ventricular dilation is present.  Global right ventricular function is moderately reduced.  Pulmonary artery systolic pressure is normal.  Dilation of the inferior vena cava is present with abnormal respiratory  variation in diameter.  _____________________________________________________________________________  __        Left Ventricle  Left ventricular size is normal. LVEF 20% based on biplane 2D tracing. Left  ventricular wall thickness is normal. Diastolic function not assessed due to  frequent ectopy. Abnormal septal motion consistent with left bundle branch  block is present.     Right Ventricle  Mild to moderate right ventricular dilation is present. Global right  ventricular function is moderately reduced.     Atria  Mild biatrial enlargement is present.     Mitral Valve  Moderate mitral insufficiency is present.        Aortic Valve  Aortic valve is normal in structure and function.     Tricuspid Valve  Moderate tricuspid insufficiency is present. The right ventricular systolic  pressure is approximated at 24.4 mmHg plus the right atrial pressure.  Pulmonary artery systolic pressure is normal.     Pulmonic Valve  The pulmonic  valve cannot be assessed. Mild pulmonic insufficiency is present.     Vessels  The aorta root is normal. The pulmonary artery cannot be assessed. Dilation of  the inferior vena cava is present with abnormal respiratory variation in  diameter.     Compared to Previous Study  Previous study not available for comparison.     _____________________________________________________________________________  __  MMode/2D Measurements & Calculations  IVSd: 0.61 cm  LVIDd: 4.7 cm  LVIDs: 3.7 cm  LVPWd: 0.75 cm  FS: 21.7 %  LV mass(C)d: 99.1 grams  LV mass(C)dI: 45.0 grams/m2     Ao root diam: 2.9 cm  asc Aorta Diam: 2.5 cm  LVOT diam: 1.9 cm  LVOT area: 2.8 cm2     EF(MOD-bp): 21.5 %  LA Volume (BP): 84.8 ml  LA Volume Index (BP): 38.5 ml/m2  RWT: 0.32        Doppler Measurements & Calculations  PA acc time: 0.09 sec     PI end-d saumya: 154.0 cm/sec  TR max saumya: 247.0 cm/sec  TR max P.4 mmHg     _____________________________________________________________________________  __           Report approved by: Graciela Tomlinson 2020 12:05 PM      XR Chest Port 1 View    Narrative    XR CHEST PORT 1 VW  2020 4:21 PM      HISTORY: SG Placement    COMPARISON: None available    FINDINGS: Single AP chest radiograph. [place] catheter tip is in the  proximal right main pulmonary artery. Right central line tip and right  upper extremity PICC line tip at the lower SVC. Trachea is midline,  cardiomegaly. Bilateral perihilar streaky opacities. Mild increased  interstitial opacities in the right lung with ill-defined pulmonary  vascularity. No pneumothorax or pleural effusion. No acute osseous or  abdominal abnormality. Elevated left hemidiaphragm.      Impression    IMPRESSION:  1. [place] catheter tip at the proximal right main pulmonary artery.  2. Cardiomegaly with mild pulmonary edema, especially on the right.    I have personally reviewed the examination and initial interpretation  and I agree with the  findings.    DONG SOLORIO MD   Cardiac Catheterization    Narrative      Successful insertion of a IABP in the RFA                Medications     Current Facility-Administered Medications   Medication     acetaminophen (TYLENOL) tablet 650 mg     albuterol (PROAIR HFA/PROVENTIL HFA/VENTOLIN HFA) 108 (90 Base) MCG/ACT inhaler 2 puff     allopurinol (ZYLOPRIM) tablet 200 mg     amiodarone (PACERONE) tablet 400 mg     ampicillin (OMNIPEN) 2 g vial to attach to  ml bag     aspirin (ASA) chewable tablet 81 mg     atorvastatin (LIPITOR) tablet 20 mg     bisacodyl (DULCOLAX) EC tablet 5 mg    Or     bisacodyl (DULCOLAX) EC tablet 10 mg    Or     bisacodyl (DULCOLAX) EC tablet 15 mg     calcium carbonate (TUMS) chewable tablet 500 mg     cefTRIAXone (ROCEPHIN) 2 g vial to attach to  ml bag for ADULTS or NS 50 ml bag for PEDS     co-enzyme Q-10 capsule 200 mg     dextrose 10% infusion     glucose gel 15-30 g    Or     dextrose 50 % injection 25-50 mL    Or     glucagon injection 1 mg     diclofenac (VOLTAREN) 1 % topical gel 4 g     DOBUTamine (DOBUTREX) 1,000 mg in D5W 250 mL infusion (adult max conc)     EPINEPHrine (ADRENALIN) 5 mg in sodium chloride 0.9 % 250 mL infusion     fentaNYL (PF) (SUBLIMAZE) injection 25 mcg     fentaNYL (SUBLIMAZE) infusion     fluconazole (DIFLUCAN) intermittent infusion 200 mg     FLUoxetine (PROzac) capsule 20 mg     heparin (porcine) 10,000 Units, magnesium sulfate 1 g, mannitol 25 % 12.5 g, sodium bicarbonate 50 mEq in sodium chloride 0.9 % PUMP PRIME solution     heparin (porcine) 30,000 Units in sodium chloride 0.9 % PERFUSION solution     hydrALAZINE (APRESOLINE) injection 10 mg     insulin 1 unit/mL in saline (NovoLIN, HumuLIN Regular) drip - ADULT IV Infusion     levofloxacin (LEVAQUIN) infusion 500 mg     lidocaine (LMX4) cream     lidocaine 1 % 0.1-1 mL     magnesium oxide (MAG-OX) tablet 400 mg     magnesium sulfate 2 g in water intermittent infusion      magnesium sulfate 4 g in 100 mL sterile water (premade)     medication instruction     melatonin tablet 3 mg     meperidine (DEMEROL) injection 12.5-25 mg     mupirocin (BACTROBAN) 2 % ointment 1 g     naloxone (NARCAN) injection 0.1-0.4 mg     norepinephrine (LEVOPHED) 16 mg in  mL infusion     pantoprazole (PROTONIX) 40 mg IV push injection     polyethylene glycol (MIRALAX) Packet 17 g     potassium chloride (KLOR-CON) Packet 20-40 mEq     potassium chloride 10 mEq in 100 mL intermittent infusion with 10 mg lidocaine     potassium chloride 10 mEq in 100 mL sterile water intermittent infusion (premix)     potassium chloride 20 mEq in 50 mL intermittent infusion     potassium chloride ER (KLOR-CON M) CR tablet 20-40 mEq     propofol (DIPRIVAN) infusion    And     propofol (DIPRIVAN) injection 10 mg/mL vial     Reason beta blocker order not selected     rifampin (RIFADIN) 600 mg in sodium chloride 0.9 % 100 mL intermittent infusion     senna-docusate (SENOKOT-S/PERICOLACE) 8.6-50 MG per tablet 1 tablet    Or     senna-docusate (SENOKOT-S/PERICOLACE) 8.6-50 MG per tablet 2 tablet     sodium chloride (PF) 0.9% PF flush 3 mL     sodium chloride (PF) 0.9% PF flush 3 mL     vancomycin 1500 mg in 0.9% NaCl 250 ml intermittent infusion 1,500 mg     vasopressin (VASOSTRICT) 40 Units in D5W 40 mL infusion     vitamin B1 (THIAMINE) tablet 100 mg

## 2020-02-19 NOTE — ANESTHESIA CARE TRANSFER NOTE
Patient: Eliseo Tanner    Procedure(s):  INSERTION, LEFT VENTRICULAR ASSIST DEVICE (HEARTMATE III)    Diagnosis: Heart failure (H) [I50.9]  Diagnosis Additional Information: No value filed.    Anesthesia Type:   General     Note:  Airway :ETT  Patient transferred to:ICU  ICU Handoff: Call for PAUSE to initiate/utilize ICU HANDOFF, Identified Patient, Identified Responsible Provider, Reviewed the Pertinent Medical History, Discussed Surgical Course, Reviewed Intra-OP Anesthesia Management and Issues during Anesthesia, Set Expectations for Post Procedure Period and Allowed Opportunity for Questions and Acknowledgement of Understanding      Vitals: (Last set prior to Anesthesia Care Transfer)    CRNA VITALS  2/18/2020 2303 - 2/19/2020 0003      2/18/2020             Resp Rate (observed):  9    Resp Rate (set):  15                Electronically Signed By: Cha Erazo MD  February 19, 2020  12:19 AM

## 2020-02-19 NOTE — PROGRESS NOTES
[facility]  General Infectious Disease Progress Note     Patient:  Eliseo Tanner, Date of birth 1955, Medical record number [id number]  Date of Visit:  February 16, 2020         Assessment and Recommendations:   Problem List:  1. Bacteremia with Proteus mirabilis and Enterococcus faecalis in 2/2 bottles on 2/13. Cultures from 2/14 are negative to date. Urine culture was negative on 2/13 making a urinary source unlikely. Lines were suspected as a source and exchanged. CT C/A/P from 2/8 without any obvious intra-abdominal source.   2. 1/2 Blood cultures on 2/15 growing Staph epi from left hand - likely a contaminate   3. Implant of HM3 left ventricular assist device (intracorporeal left ventricular assist device) - 2/18    Chest was re-opened during the procedure because of concern for bleed  4. Vfib arrest  5. Chronic systolic heart failure with exacerbation. Plan for LVAD placement mid-week  6. Type 2 diabetes  7. CKD 3  8. 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 Continue ceftriaxone 2g IV BID  2. Continue Ampicillin 2 grams IV Q4H   3. Vancomycin, Rifampin, Levofloxacin, and Fluconazole (per CVTS)    Discussion:   Mr Eliseo Tanner is a 65yo M with a history of chronic systolic heart failure, NICM, moderate CAD, HTN, DM2, CKDIII who was admitted to [facility] on 2/7/20 for evaluation of heart failure. Prior to arrival to the floor he was in Vfib and required shocks x 3. He was being diuresed as expected, and has been requiring inotropes and a balloon pump. On 2/13 morning had rigors and chills. Blood cultures (2/2) were positive for Proteus mirabilis and Enterococcus. I suspect this is most likely from his lines. Lines all removed and changed on 2/13 evening and balloon pump was also exchanged. His blood cultures from 2/14 remain negative to date. He did have one of two blood cultures from the hand growing Staph epi on 2/15. I believe that this is a contaminate  and not likely related to this current episode of bacteremia.      [facility]   Infectious Diseases   2661        Interval History:   S/p LVAD implant (12/18). Tmax 100.6. During the day had been on 4 pressures, slowly weaning down.   Intubated and sedated. Unresponsive.   Unable to obtain a complete ROS         Current Antimicrobials   Vancomycin 2/13-2/16  Ampicillin 2/17-Present   Ceftriaxone 2/15-present  Meropenem 2/14-2/15  Tobramycin x 1 2/13  Pip/tazo 2/13-2/14         Physical Exam:   Ranges for vital signs:  Temp:  [96.8  F (36  C)-100.6  F (38.1  C)] 97  F (36.1  C)  Pulse:  [98] 98  Heart Rate:  [] 96  Resp:  [14] 14  BP: (72)/(53) 72/53  MAP:  [38 mmHg-238 mmHg] 92 mmHg  Arterial Line BP: ()/() 102/82  FiO2 (%):  [40 %-100 %] 40 %  SpO2:  [99 %-100 %] 100 %    Exam:  GENERAL: Intubated, sedated  ENT:  Head is normocephalic, atraumatic. ET tube in place  EYES:  Eyes have anicteric sclerae. PERRL.   NECK:  Supple. No cervical lymphadenopathy  Chest: Wound vac in place   LUNGS:  Clear  ABDOMEN:  Non-distended.   EXT: Extremities warm and without edema.  SKIN:  No acute rashes.   NEUROLOGIC:  Unable to assess          Laboratory Data:     Creatinine   Date Value Ref Range Status   02/19/2020 1.55 (H) 0.66 - 1.25 mg/dL Final   02/19/2020 1.39 (H) 0.66 - 1.25 mg/dL Final   02/18/2020 1.48 (H) 0.66 - 1.25 mg/dL Final   02/17/2020 1.45 (H) 0.66 - 1.25 mg/dL Final   02/17/2020 1.56 (H) 0.66 - 1.25 mg/dL Final     WBC   Date Value Ref Range Status   02/19/2020 7.1 4.0 - 11.0 10e9/L Final   02/19/2020 8.3 4.0 - 11.0 10e9/L Final   02/19/2020 11.4 (H) 4.0 - 11.0 10e9/L Final   02/19/2020 11.5 (H) 4.0 - 11.0 10e9/L Final   02/19/2020 12.5 (H) 4.0 - 11.0 10e9/L Final     Hemoglobin   Date Value Ref Range Status   02/19/2020 7.4 (L) 13.3 - 17.7 g/dL Final     Platelet Count   Date Value Ref Range Status   02/19/2020 140 (L) 150 - 450 10e9/L Final     CRP Inflammation   Date Value Ref Range Status    02/08/2020 21.0 (H) 0.0 - 8.0 mg/L Final     AST   Date Value Ref Range Status   02/19/2020 55 (H) 0 - 45 U/L Final   02/19/2020 46 (H) 0 - 45 U/L Final   02/18/2020 31 0 - 45 U/L Final   02/16/2020 37 0 - 45 U/L Final   02/15/2020 43 0 - 45 U/L Final     ALT   Date Value Ref Range Status   02/19/2020 19 0 - 70 U/L Final   02/19/2020 19 0 - 70 U/L Final   02/18/2020 24 0 - 70 U/L Final   02/16/2020 44 0 - 70 U/L Final   02/15/2020 47 0 - 70 U/L Final     Bilirubin Total   Date Value Ref Range Status   02/19/2020 1.5 (H) 0.2 - 1.3 mg/dL Final   02/19/2020 1.3 0.2 - 1.3 mg/dL Final   02/18/2020 0.4 0.2 - 1.3 mg/dL Final   02/16/2020 0.5 0.2 - 1.3 mg/dL Final   02/15/2020 0.6 0.2 - 1.3 mg/dL Final     Lab Results   Component Value Date     02/19/2020    BUN 31 (H) 02/19/2020    CO2 30 02/19/2020     Microbiology:     Culture data:  2/16: Blood culture x 2 - NG  2/15: Blood culture 1 of 2 growing staph epi  2/14 Blood culture NGTD  2/13 Blood culture: Enterococcus faecalis, sensis pending; Proteus mirabilis, widely sensitive  2/13 Urine culture negative

## 2020-02-19 NOTE — ANESTHESIA POSTPROCEDURE EVALUATION
Anesthesia POST Procedure Evaluation    Patient: Eliseo Tanner   MRN:     [id number] Gender:   male   Age:    64 year old :      1955        Preoperative Diagnosis: Heart failure (H) [I50.9]   Procedure(s):  INSERTION, LEFT VENTRICULAR ASSIST DEVICE (HEARTMATE III)   Postop Comments: No value filed.     Anesthesia Type: General       Disposition: ICU            ICU Sign Out: Anesthesiologist/ICU physician sign out WAS performed   Postop Pain Control: Uneventful            Sign Out: Well controlled pain   PONV: No   Neuro/Psych: Uneventful            Sign Out: PLANNED postop sedation   Airway/Respiratory: Uneventful            Sign Out: Acceptable/Baseline resp. status; AIRWAY IN SITU/Resp. Support               Airway in situ/Resp. Support: ETT                 Reason: Planned Pre-op   CV/Hemodynamics: Uneventful            Sign Out: Acceptable CV status   Other NRE: NONE   DID A NON-ROUTINE EVENT OCCUR? No    Event details/Postop Comments:  Patient transported directly from the OR to the ICU sedated and intubated.  BMV with 100% FiO2 and Jeramy.  Vitals stable on epi 0.1, NE 0.08, vaso 2, dobutamine 7.5.  Report given to ICU.         Last Anesthesia Record Vitals:  CRNA VITALS  2020 2303 - 2020 0003      2020             Resp Rate (observed):  9    Resp Rate (set):  15          Last PACU Vitals:  Vitals Value Taken Time   BP     Temp     Pulse     Resp     SpO2 100 % 2020 12:10 AM   Temp src     NIBP     Pulse     SpO2     Resp     Temp     Ht Rate     Temp 2     Vitals shown include unvalidated device data.      Electronically Signed By: Antonette Rendon MD, 2020, 12:11 AM

## 2020-02-19 NOTE — ANESTHESIA PROCEDURE NOTES
CHAMP Probe Insertion Note:    Inserted by:  Antonette Rendon MD (Responsible Anesthesiologist)  Performed by  Personally  Probe Number: [id number]  Probe Status PRE Insertion: NO obvious damage  Probe type:  Adult 3D    Bite block used:   Yes  Insertion Technique: Jaw Lift  Insertion complications: None obvious    Billing Report:CHAMP report by Anesthesiologist (See Separate Report note)    Probe Status POST Removal: NO obvious damage

## 2020-02-19 NOTE — PLAN OF CARE
PT 4E: PT orders received. Per discussion with RN, pt's BP and LVAD numbers not tolerating turning, pt not yet appropriate for PT. PT will hold at this time and initiate services when appropriate, OT will continue to follow closely for ROM or early mobility as indicated.

## 2020-02-19 NOTE — BRIEF OP NOTE
[facility], [place]    Brief Operative Note    Pre-operative diagnosis: Heart failure (H) [I50.9]  Post-operative diagnosis Same as pre-operative diagnosis    Procedure(s):  INSERTION, LEFT VENTRICULAR ASSIST DEVICE (HEARTMATE III)  Surgeon: Surgeon(s) and Role:     * Mac Jaramillo MD - Primary     * Iliana Rogers MD - Assisting     * Jamil Apodaca MD - Assisting  Anesthesia: General   Estimated blood loss: 1 L  Fluid give: 3.7 L plasmalyte, 500 ml Albumin, 895 cell-saver    Drains:  2 mediastinal, 2 pleural chest tubes, one kosta drain  Specimens:   ID Type Source Tests Collected by Time Destination   A : core of left ventricle Tissue Cardiac muscle SURGICAL PATHOLOGY EXAM Mac Jaramillo MD 2/18/2020  6:46 PM      Findings:   None.  Complications: None.  Implants:   Implant Name Type Inv. Item Serial No.  Lot No. LRB No. Used   Thoratec Heartmate 3 Ventricular Assist Device Pump   [id number]   Left 1   Thoratec Heartmate 3 Apical Cuff      [id number] Left 1   Thoratec Heartmate 3 Sealed Outflow Graft with Bend Relief      [id number] Left 1   GRAFT GORTEX [id number] STRETCH [id number] Graft GRAFT GORTEX [id number] STRETCH [id number]  W.L.GORE  [id number] Left 1   Thoratec Heartmate 3 VAD Modular Cable      [id number] Left 1   Thoratec Heartmate 3 Ventricular Assist Device-Blood Pump   [id number]   Left 1     Addendum:    Given ~700 ml of blood from the pleural chest tubes and sucking down events on CHAMP the decision was made to open. No obvious source of bleeding. Small chest wall oozing was coagulated. The chest was closed again     Total fluids given: 3 pRBC, 3 FFP, 1 platelet, 1 cryo, 5.5 plasmalyte, 1 L LR, 500 ml albumin, also DDAVP and Factor VII was given    Madhuri Carter ([place])

## 2020-02-20 ENCOUNTER — APPOINTMENT (OUTPATIENT)
Dept: GENERAL RADIOLOGY | Facility: CLINIC | Age: 65
DRG: 001 | End: 2020-02-20
Attending: STUDENT IN AN ORGANIZED HEALTH CARE EDUCATION/TRAINING PROGRAM
Payer: MEDICARE

## 2020-02-20 LAB
ABO + RH BLD: NORMAL
ABO + RH BLD: NORMAL
ANION GAP SERPL CALCULATED.3IONS-SCNC: 4 MMOL/L (ref 3–14)
ANION GAP SERPL CALCULATED.3IONS-SCNC: 5 MMOL/L (ref 3–14)
APTT PPP: 32 SEC (ref 22–37)
APTT PPP: 35 SEC (ref 22–37)
APTT PPP: 55 SEC (ref 22–37)
BACTERIA SPEC CULT: NO GROWTH
BASE EXCESS BLDA CALC-SCNC: 4.7 MMOL/L
BASE EXCESS BLDA CALC-SCNC: 5.4 MMOL/L
BASE EXCESS BLDV CALC-SCNC: 2.9 MMOL/L
BLD GP AB SCN SERPL QL: NORMAL
BLOOD BANK CMNT PATIENT-IMP: NORMAL
BUN SERPL-MCNC: 32 MG/DL (ref 7–30)
BUN SERPL-MCNC: 33 MG/DL (ref 7–30)
BUN SERPL-MCNC: 34 MG/DL (ref 7–30)
CALCIUM SERPL-MCNC: 7.5 MG/DL (ref 8.5–10.1)
CALCIUM SERPL-MCNC: 7.6 MG/DL (ref 8.5–10.1)
CHLORIDE SERPL-SCNC: 111 MMOL/L (ref 94–109)
CHLORIDE SERPL-SCNC: 113 MMOL/L (ref 94–109)
CO2 SERPL-SCNC: 28 MMOL/L (ref 20–32)
CO2 SERPL-SCNC: 29 MMOL/L (ref 20–32)
COPATH REPORT: NORMAL
COTININE SERPL-MCNC: NORMAL NG/ML
CREAT SERPL-MCNC: 1.71 MG/DL (ref 0.66–1.25)
CREAT SERPL-MCNC: 1.73 MG/DL (ref 0.66–1.25)
CREAT SERPL-MCNC: 1.8 MG/DL (ref 0.66–1.25)
ERYTHROCYTE [DISTWIDTH] IN BLOOD BY AUTOMATED COUNT: 14.6 % (ref 10–15)
ERYTHROCYTE [DISTWIDTH] IN BLOOD BY AUTOMATED COUNT: 15 % (ref 10–15)
ERYTHROCYTE [DISTWIDTH] IN BLOOD BY AUTOMATED COUNT: 15.9 % (ref 10–15)
GFR SERPL CREATININE-BSD FRML MDRD: 38 ML/MIN/{1.73_M2}
GFR SERPL CREATININE-BSD FRML MDRD: 40 ML/MIN/{1.73_M2}
GFR SERPL CREATININE-BSD FRML MDRD: 41 ML/MIN/{1.73_M2}
GLUCOSE BLDC GLUCOMTR-MCNC: 114 MG/DL (ref 70–99)
GLUCOSE BLDC GLUCOMTR-MCNC: 115 MG/DL (ref 70–99)
GLUCOSE BLDC GLUCOMTR-MCNC: 118 MG/DL (ref 70–99)
GLUCOSE BLDC GLUCOMTR-MCNC: 119 MG/DL (ref 70–99)
GLUCOSE BLDC GLUCOMTR-MCNC: 120 MG/DL (ref 70–99)
GLUCOSE BLDC GLUCOMTR-MCNC: 122 MG/DL (ref 70–99)
GLUCOSE BLDC GLUCOMTR-MCNC: 123 MG/DL (ref 70–99)
GLUCOSE BLDC GLUCOMTR-MCNC: 124 MG/DL (ref 70–99)
GLUCOSE BLDC GLUCOMTR-MCNC: 124 MG/DL (ref 70–99)
GLUCOSE BLDC GLUCOMTR-MCNC: 127 MG/DL (ref 70–99)
GLUCOSE BLDC GLUCOMTR-MCNC: 127 MG/DL (ref 70–99)
GLUCOSE BLDC GLUCOMTR-MCNC: 128 MG/DL (ref 70–99)
GLUCOSE SERPL-MCNC: 128 MG/DL (ref 70–99)
GLUCOSE SERPL-MCNC: 136 MG/DL (ref 70–99)
HCO3 BLD-SCNC: 29 MMOL/L (ref 21–28)
HCO3 BLD-SCNC: 30 MMOL/L (ref 21–28)
HCO3 BLDV-SCNC: 28 MMOL/L (ref 21–28)
HCT VFR BLD AUTO: 27.2 % (ref 40–53)
HCT VFR BLD AUTO: 27.3 % (ref 40–53)
HCT VFR BLD AUTO: 27.5 % (ref 40–53)
HGB BLD-MCNC: 9.1 G/DL (ref 13.3–17.7)
INR PPP: 1.26 (ref 0.86–1.14)
INR PPP: 1.31 (ref 0.86–1.14)
MAGNESIUM SERPL-MCNC: 2.2 MG/DL (ref 1.6–2.3)
MAGNESIUM SERPL-MCNC: 2.3 MG/DL (ref 1.6–2.3)
MCH RBC QN AUTO: 28.4 PG (ref 26.5–33)
MCH RBC QN AUTO: 28.5 PG (ref 26.5–33)
MCH RBC QN AUTO: 28.8 PG (ref 26.5–33)
MCHC RBC AUTO-ENTMCNC: 33.1 G/DL (ref 31.5–36.5)
MCHC RBC AUTO-ENTMCNC: 33.3 G/DL (ref 31.5–36.5)
MCHC RBC AUTO-ENTMCNC: 33.5 G/DL (ref 31.5–36.5)
MCV RBC AUTO: 85 FL (ref 78–100)
MCV RBC AUTO: 86 FL (ref 78–100)
MCV RBC AUTO: 87 FL (ref 78–100)
MISCELLANEOUS TEST: NORMAL
NICOTINE SERPL-MCNC: NORMAL NG/ML
O2/TOTAL GAS SETTING VFR VENT: 40 %
OXYHGB MFR BLD: 97 % (ref 92–100)
OXYHGB MFR BLD: 98 % (ref 92–100)
OXYHGB MFR BLDV: 64 %
PCO2 BLD: 41 MM HG (ref 35–45)
PCO2 BLD: 44 MM HG (ref 35–45)
PCO2 BLDV: 42 MM HG (ref 40–50)
PH BLD: 7.45 PH (ref 7.35–7.45)
PH BLD: 7.46 PH (ref 7.35–7.45)
PH BLDV: 7.43 PH (ref 7.32–7.43)
PHOSPHATE SERPL-MCNC: 4.2 MG/DL (ref 2.5–4.5)
PHOSPHATE SERPL-MCNC: 4.4 MG/DL (ref 2.5–4.5)
PLATELET # BLD AUTO: 158 10E9/L (ref 150–450)
PLATELET # BLD AUTO: 174 10E9/L (ref 150–450)
PLATELET # BLD AUTO: 203 10E9/L (ref 150–450)
PO2 BLD: 122 MM HG (ref 80–105)
PO2 BLD: 140 MM HG (ref 80–105)
PO2 BLDV: 35 MM HG (ref 25–47)
POTASSIUM SERPL-SCNC: 3.9 MMOL/L (ref 3.4–5.3)
POTASSIUM SERPL-SCNC: 3.9 MMOL/L (ref 3.4–5.3)
POTASSIUM SERPL-SCNC: 4 MMOL/L (ref 3.4–5.3)
POTASSIUM SERPL-SCNC: 4.1 MMOL/L (ref 3.4–5.3)
RBC # BLD AUTO: 3.16 10E12/L (ref 4.4–5.9)
RBC # BLD AUTO: 3.19 10E12/L (ref 4.4–5.9)
RBC # BLD AUTO: 3.2 10E12/L (ref 4.4–5.9)
SODIUM SERPL-SCNC: 144 MMOL/L (ref 133–144)
SODIUM SERPL-SCNC: 146 MMOL/L (ref 133–144)
SPECIMEN EXP DATE BLD: NORMAL
SPECIMEN SOURCE: NORMAL
WBC # BLD AUTO: 12.5 10E9/L (ref 4–11)
WBC # BLD AUTO: 7.9 10E9/L (ref 4–11)
WBC # BLD AUTO: 9.4 10E9/L (ref 4–11)

## 2020-02-20 PROCEDURE — C9113 INJ PANTOPRAZOLE SODIUM, VIA: HCPCS | Performed by: STUDENT IN AN ORGANIZED HEALTH CARE EDUCATION/TRAINING PROGRAM

## 2020-02-20 PROCEDURE — 94003 VENT MGMT INPAT SUBQ DAY: CPT

## 2020-02-20 PROCEDURE — 40000196 ZZH STATISTIC RAPCV CVP MONITORING

## 2020-02-20 PROCEDURE — 71045 X-RAY EXAM CHEST 1 VIEW: CPT

## 2020-02-20 PROCEDURE — 82805 BLOOD GASES W/O2 SATURATION: CPT | Performed by: THORACIC SURGERY (CARDIOTHORACIC VASCULAR SURGERY)

## 2020-02-20 PROCEDURE — 25000132 ZZH RX MED GY IP 250 OP 250 PS 637: Mod: GY

## 2020-02-20 PROCEDURE — 99233 SBSQ HOSP IP/OBS HIGH 50: CPT | Mod: GC | Performed by: INTERNAL MEDICINE

## 2020-02-20 PROCEDURE — C9399 UNCLASSIFIED DRUGS OR BIOLOG: HCPCS

## 2020-02-20 PROCEDURE — 83735 ASSAY OF MAGNESIUM: CPT

## 2020-02-20 PROCEDURE — 25000128 H RX IP 250 OP 636: Performed by: STUDENT IN AN ORGANIZED HEALTH CARE EDUCATION/TRAINING PROGRAM

## 2020-02-20 PROCEDURE — 85610 PROTHROMBIN TIME: CPT | Performed by: THORACIC SURGERY (CARDIOTHORACIC VASCULAR SURGERY)

## 2020-02-20 PROCEDURE — 94799 UNLISTED PULMONARY SVC/PX: CPT

## 2020-02-20 PROCEDURE — 25000125 ZZHC RX 250: Performed by: STUDENT IN AN ORGANIZED HEALTH CARE EDUCATION/TRAINING PROGRAM

## 2020-02-20 PROCEDURE — 86901 BLOOD TYPING SEROLOGIC RH(D): CPT | Performed by: THORACIC SURGERY (CARDIOTHORACIC VASCULAR SURGERY)

## 2020-02-20 PROCEDURE — 25000132 ZZH RX MED GY IP 250 OP 250 PS 637: Mod: GY | Performed by: INTERNAL MEDICINE

## 2020-02-20 PROCEDURE — 80048 BASIC METABOLIC PNL TOTAL CA: CPT | Performed by: INTERNAL MEDICINE

## 2020-02-20 PROCEDURE — 84100 ASSAY OF PHOSPHORUS: CPT

## 2020-02-20 PROCEDURE — 84100 ASSAY OF PHOSPHORUS: CPT | Performed by: INTERNAL MEDICINE

## 2020-02-20 PROCEDURE — 40000048 ZZH STATISTIC DAILY SWAN MONITORING

## 2020-02-20 PROCEDURE — 25800030 ZZH RX IP 258 OP 636: Performed by: STUDENT IN AN ORGANIZED HEALTH CARE EDUCATION/TRAINING PROGRAM

## 2020-02-20 PROCEDURE — 00000146 ZZHCL STATISTIC GLUCOSE BY METER IP

## 2020-02-20 PROCEDURE — 86900 BLOOD TYPING SEROLOGIC ABO: CPT | Performed by: THORACIC SURGERY (CARDIOTHORACIC VASCULAR SURGERY)

## 2020-02-20 PROCEDURE — 20000004 ZZH R&B ICU UMMC

## 2020-02-20 PROCEDURE — 85730 THROMBOPLASTIN TIME PARTIAL: CPT | Performed by: STUDENT IN AN ORGANIZED HEALTH CARE EDUCATION/TRAINING PROGRAM

## 2020-02-20 PROCEDURE — 86850 RBC ANTIBODY SCREEN: CPT | Performed by: THORACIC SURGERY (CARDIOTHORACIC VASCULAR SURGERY)

## 2020-02-20 PROCEDURE — 83735 ASSAY OF MAGNESIUM: CPT | Performed by: INTERNAL MEDICINE

## 2020-02-20 PROCEDURE — 85027 COMPLETE CBC AUTOMATED: CPT | Performed by: INTERNAL MEDICINE

## 2020-02-20 PROCEDURE — 25000132 ZZH RX MED GY IP 250 OP 250 PS 637: Mod: GY | Performed by: STUDENT IN AN ORGANIZED HEALTH CARE EDUCATION/TRAINING PROGRAM

## 2020-02-20 PROCEDURE — 82805 BLOOD GASES W/O2 SATURATION: CPT

## 2020-02-20 PROCEDURE — 99232 SBSQ HOSP IP/OBS MODERATE 35: CPT | Performed by: ANESTHESIOLOGY

## 2020-02-20 PROCEDURE — 40000275 ZZH STATISTIC RCP TIME EA 10 MIN

## 2020-02-20 PROCEDURE — 40000014 ZZH STATISTIC ARTERIAL MONITORING DAILY

## 2020-02-20 PROCEDURE — 82805 BLOOD GASES W/O2 SATURATION: CPT | Performed by: STUDENT IN AN ORGANIZED HEALTH CARE EDUCATION/TRAINING PROGRAM

## 2020-02-20 PROCEDURE — 84132 ASSAY OF SERUM POTASSIUM: CPT

## 2020-02-20 PROCEDURE — 85730 THROMBOPLASTIN TIME PARTIAL: CPT | Performed by: INTERNAL MEDICINE

## 2020-02-20 RX ORDER — LIDOCAINE HYDROCHLORIDE 20 MG/ML
5 SOLUTION OROPHARYNGEAL ONCE
Status: COMPLETED | OUTPATIENT
Start: 2020-02-20 | End: 2020-02-21

## 2020-02-20 RX ORDER — FUROSEMIDE 10 MG/ML
80 INJECTION INTRAMUSCULAR; INTRAVENOUS ONCE
Status: COMPLETED | OUTPATIENT
Start: 2020-02-20 | End: 2020-02-20

## 2020-02-20 RX ADMIN — POTASSIUM CHLORIDE 20 MEQ: 1.5 POWDER, FOR SOLUTION ORAL at 02:30

## 2020-02-20 RX ADMIN — HUMAN INSULIN 1 UNITS/HR: 100 INJECTION, SOLUTION SUBCUTANEOUS at 13:37

## 2020-02-20 RX ADMIN — Medication 50 MCG/HR: at 20:19

## 2020-02-20 RX ADMIN — AMIODARONE HYDROCHLORIDE 400 MG: 200 TABLET ORAL at 07:48

## 2020-02-20 RX ADMIN — SENNOSIDES AND DOCUSATE SODIUM 2 TABLET: 8.6; 5 TABLET ORAL at 20:05

## 2020-02-20 RX ADMIN — DOBUTAMINE 7.5 MCG/KG/MIN: 12.5 INJECTION, SOLUTION INTRAVENOUS at 04:43

## 2020-02-20 RX ADMIN — FLUOXETINE 20 MG: 20 CAPSULE ORAL at 07:48

## 2020-02-20 RX ADMIN — THIAMINE HCL TAB 100 MG 100 MG: 100 TAB at 07:48

## 2020-02-20 RX ADMIN — FUROSEMIDE 80 MG: 10 INJECTION, SOLUTION INTRAVENOUS at 14:49

## 2020-02-20 RX ADMIN — PROPOFOL 30 MCG/KG/MIN: 10 INJECTION, EMULSION INTRAVENOUS at 06:19

## 2020-02-20 RX ADMIN — PROPOFOL 20 MCG/KG/MIN: 10 INJECTION, EMULSION INTRAVENOUS at 13:34

## 2020-02-20 RX ADMIN — ASPIRIN 81 MG CHEWABLE TABLET 81 MG: 81 TABLET CHEWABLE at 07:48

## 2020-02-20 RX ADMIN — MUPIROCIN 1 G: 20 OINTMENT TOPICAL at 07:49

## 2020-02-20 RX ADMIN — MAGNESIUM OXIDE 400 MG: 400 TABLET ORAL at 07:48

## 2020-02-20 RX ADMIN — VANCOMYCIN HYDROCHLORIDE 1500 MG: 10 INJECTION, POWDER, LYOPHILIZED, FOR SOLUTION INTRAVENOUS at 05:57

## 2020-02-20 RX ADMIN — AMPICILLIN SODIUM 2 G: 2 INJECTION, POWDER, FOR SOLUTION INTRAMUSCULAR; INTRAVENOUS at 20:06

## 2020-02-20 RX ADMIN — EPINEPHRINE 0.02 MCG/KG/MIN: 1 INJECTION PARENTERAL at 13:34

## 2020-02-20 RX ADMIN — AMPICILLIN SODIUM 2 G: 2 INJECTION, POWDER, FOR SOLUTION INTRAMUSCULAR; INTRAVENOUS at 02:30

## 2020-02-20 RX ADMIN — ALLOPURINOL 200 MG: 100 TABLET ORAL at 07:48

## 2020-02-20 RX ADMIN — CEFTRIAXONE 2 G: 2 INJECTION, POWDER, FOR SOLUTION INTRAMUSCULAR; INTRAVENOUS at 10:21

## 2020-02-20 RX ADMIN — BIVALIRUDIN 0.02 MG/KG/HR: 250 INJECTION, POWDER, LYOPHILIZED, FOR SOLUTION INTRAVENOUS at 14:57

## 2020-02-20 RX ADMIN — FLUCONAZOLE 200 MG: 2 INJECTION, SOLUTION INTRAVENOUS at 18:53

## 2020-02-20 RX ADMIN — EPINEPHRINE 0.02 MCG/KG/MIN: 1 INJECTION PARENTERAL at 10:23

## 2020-02-20 RX ADMIN — DICLOFENAC 4 G: 10 GEL TOPICAL at 16:19

## 2020-02-20 RX ADMIN — POTASSIUM CHLORIDE 20 MEQ: 29.8 INJECTION, SOLUTION INTRAVENOUS at 06:18

## 2020-02-20 RX ADMIN — AMPICILLIN SODIUM 2 G: 2 INJECTION, POWDER, FOR SOLUTION INTRAMUSCULAR; INTRAVENOUS at 13:37

## 2020-02-20 RX ADMIN — AMPICILLIN SODIUM 2 G: 2 INJECTION, POWDER, FOR SOLUTION INTRAMUSCULAR; INTRAVENOUS at 07:47

## 2020-02-20 RX ADMIN — CEFTRIAXONE 2 G: 2 INJECTION, POWDER, FOR SOLUTION INTRAMUSCULAR; INTRAVENOUS at 22:50

## 2020-02-20 RX ADMIN — POTASSIUM CHLORIDE 20 MEQ: 29.8 INJECTION, SOLUTION INTRAVENOUS at 21:59

## 2020-02-20 RX ADMIN — ATORVASTATIN CALCIUM 20 MG: 20 TABLET, FILM COATED ORAL at 20:06

## 2020-02-20 RX ADMIN — PROPOFOL 40 MCG/KG/MIN: 10 INJECTION, EMULSION INTRAVENOUS at 02:29

## 2020-02-20 RX ADMIN — PANTOPRAZOLE SODIUM 40 MG: 40 INJECTION, POWDER, FOR SOLUTION INTRAVENOUS at 07:49

## 2020-02-20 RX ADMIN — DICLOFENAC 4 G: 10 GEL TOPICAL at 19:29

## 2020-02-20 RX ADMIN — RIFAMPIN 600 MG: 600 INJECTION, POWDER, LYOPHILIZED, FOR SOLUTION INTRAVENOUS at 16:43

## 2020-02-20 RX ADMIN — LEVOFLOXACIN 500 MG: 5 INJECTION, SOLUTION INTRAVENOUS at 17:42

## 2020-02-20 RX ADMIN — DICLOFENAC 4 G: 10 GEL TOPICAL at 07:49

## 2020-02-20 RX ADMIN — SENNOSIDES AND DOCUSATE SODIUM 2 TABLET: 8.6; 5 TABLET ORAL at 07:48

## 2020-02-20 RX ADMIN — DICLOFENAC 4 G: 10 GEL TOPICAL at 12:13

## 2020-02-20 RX ADMIN — PROPOFOL 20 MCG/KG/MIN: 10 INJECTION, EMULSION INTRAVENOUS at 20:06

## 2020-02-20 RX ADMIN — MUPIROCIN 1 G: 20 OINTMENT TOPICAL at 19:28

## 2020-02-20 ASSESSMENT — ACTIVITIES OF DAILY LIVING (ADL)
ADLS_ACUITY_SCORE: 21

## 2020-02-20 ASSESSMENT — MIFFLIN-ST. JEOR: SCORE: 1752.63

## 2020-02-20 NOTE — OP NOTE
Procedure Date: 2020      PREOPERATIVE DIAGNOSES:  Cardiogenic shock on intraaortic balloon pump and IV inotropes.      POSTOPERATIVE DIAGNOSES:  Cardiogenic shock on intraaortic balloon pump and IV inotropes.      PROCEDURES:  Placement of HeartMate 3 left ventricular assist device (intracorporeal left ventricular device).      SURGEON:  Mac Jaramillo MD      COSURGEON:  Jamil Feliz MD      NOTE:  A second attending surgeon requested for the operation because of complexity of the operation as well as the absence of any qualified resident or fellow.  Please refer to full details by Dr. Jaramillo who was the primary surgeon.      DESCRIPTION OF OPERATION:  The patient was brought to operating room in stable condition.  For the emergent general anesthesia, the patient's chest, abdomen and lower extremities prepped and draped in the usual sterile manner.  Median sternotomy was performed.  Cardiopulmonary bypass was instituted in the usual standard fashion.  Inflow and outflow portions of the pump implanted in the usual standard fashion.  Driveline was tunneled out to right upper quadrant incision.  The patient gradually weaned off cardiopulmonary bypass and vent pump was initiated.  Initial hemodynamics were stable.  The patient was extremely coagulopathic so several minutes spent facilitating hemostasis.  Finally, hemostasis achieved.  Chest tube placed.  Sternum was closed in layers.  The patient needed to be reopened again and was finally closed and transferred to ICU in stable critical condition.  Note:  As cosurgeon, I performed specific parts of the operation.         JAMIL FELIZ MD             D: 2020   T: 2020   MT: DAVID      Name:     WOLFGANG ELACH   MRN:      [id number]        Account:        [id number]   :      1955           Procedure Date: 2020      Document: [id number]       cc: [facility] Surgery Billing

## 2020-02-20 NOTE — PROGRESS NOTES
LVAD Social Work Services Progress Note      Date of Initial Social Work Evaluation: 02/10/2020  Collaborated with: Pt's wife (Veronique), daughter (Mayra), and son (Antwan)    Data: Pt received LVAD 02/18. Post-op course complicated by significant bleeding. Pt remains on pressors and intubated on 4E. Family reports plans to wean pressors and possibly extubate today or tomorrow. Writer provided supportive visit with Pt's family in 4E waiting room.  Intervention: Supportive visit with Pt's family  Assessment: Pt's family acknowledged post-op complications. Pt's family in better spirits today, as Pt was able to open eyes and respond to verbal commands. Pt hope for continued progress through the weekend.     Wife currently staying at niece's house in [place], [place]. Pt's children staying in local [employer]. Pt's family remains on waitlist for [place]. Pt's secondary caregiver (Grecia) will plan to arrive once LVAD teaching is planned. Per accommodations specialist, upcoming [place] vacancies unlikely - Writer passed along info to family and encouraged them to begin considering alternative local lodging. Pt's daughter to follow up with [facility]. Writer informed family that [employer] funding will be started once lodging plans are solidified. Pt's wife plans to return to [place], [place] tomorrow and return Sunday. Pt's wife has connected with PT provider locally.    Pt's daughter (Mayra) would like to participate in LVAD education - encouraged to communicate with VAD coordinator in regards to scheduling options.     Education provided by SW: local accommodations, support group, funding, ongoing SW support  Plan:    Discharge Plans in Progress: None    Barriers to d/c plan: Medical clearance    Follow up Plan: Ongoing SW support

## 2020-02-20 NOTE — PROGRESS NOTES
CV ICU Progress Note  2/18/2020      CO-MORBIDITIES:   Patient Active Problem List   Diagnosis     Acute on chronic systolic congestive heart failure (H)     Anxiety     CKD (chronic kidney disease) stage 3, GFR 30-59 ml/min (H)     Coronary artery disease involving native coronary artery of native heart without angina pectoris     GERD (gastroesophageal reflux disease)     Gout     Hyperlipidemia with target LDL less than 100     Nonischemic cardiomyopathy (H)     Non-nephrotic range proteinuria     ABHINAV on CPAP     Type 2 diabetes mellitus with stage 3 chronic kidney disease, without long-term current use of insulin (H)     Heart failure (H)     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 Redd on 2/18 for chronic systolic heart failure 2/2 [facility].    PLAN FOR TODAY:  - wean Jeramy per cardiology  - decrease sedation  - wait on PST until Jeramy is off  - remove arterial sheath, then start bivalirudin  - discuss diuresis with cardiology  - NJ for TF    PLAN:  Neuro/ pain/ sedation:  - Monitor neurological status. Notify the MD for any acute changes in exam.  - Fentanyl gtt for pain.  - Propofol gtt for sedation.  - continue home fluoxetine    Pulmonary care:   #COPD  - MV  - Titrate FiO2 for SpO2 >92%  - ABHINAV on CPAP, order CPAP while sleeping once extubated    Cardiovascular:    #Moderate CAD ([facility] 11/2019 nonobstructive CAD w/ severe branch disease)  #CHF EF 15-20% s/p LVAD placement  #Vfib this admission, s/p amio load  - Monitor hemodynamic status.   - MAP >65  - Hold PTA entresto, spironolactone  - continue ASA 81 mg, atorvastatin  - continue amio 400 mg BID  - Jeramy wean    GI care:   - NPO except ice chips and medications.  - NJ placement today    Fluids/ Electrolytes/ Nutrition:   - TKO for IV fluid hydration  - No indication for parenteral nutrition.    Renal/ Fluid  Balance:    #CKD III  - Urine output is adequate so far.  - Will continue to monitor intake and output.    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   - No chemical DVT prophylaxis due to high risk of bleeding.   - Protonix qd    Lines/ tubes/ drains:  - MAC, Jefferson, Arterial line, LIJ dialysis line, lombardo    Disposition:  - CV ICU.     Patient seen, findings and plan discussed with CV ICU staff, Dr. Garcia.    Scott Giron MD  CA-3/PGY-4 Anesthesiology  [phone]      ====================================    PAST MEDICAL HISTORY: No past medical history on file.    PAST SURGICAL HISTORY:   Past Surgical History:   Procedure Laterality Date     CV CENTRAL VENOUS CATHETER PLACEMENT N/A 2/13/2020    Procedure: Central Venous Catheter Placement;  Surgeon: Chente Moss MD;  Location:  HEART CARDIAC CATH LAB     CV INTRA-AORTIC BALLOON PUMP INSERTION N/A 2/7/2020    Procedure: Intra-Aortic Balloon Pump Insertion;  Surgeon: Jose Baldwin MD;  Location:  HEART CARDIAC CATH LAB     CV INTRA-AORTIC BALLOON PUMP INSERTION N/A 2/13/2020    Procedure: Intra-Aortic Balloon Pump Insertion;  Surgeon: Chente Moss MD;  Location: [facility] CARDIAC CATH LAB     CV SWAN LUCIANA PROCEDURE N/A 2/13/2020    Procedure: Jefferson Luciana Procedure;  Surgeon: Chente Moss MD;  Location:  HEART CARDIAC CATH LAB        FAMILY HISTORY: No family history on file.    SOCIAL HISTORY:   Social History     Tobacco Use     Smoking status: Not on file   Substance Use Topics     Alcohol use: Not on file         OBJECTIVE:   1. VITAL SIGNS:   Temp:  [96.8  F (36  C)-99.1  F (37.3  C)] 99.1  F (37.3  C)  Heart Rate:  [] 88  Resp:  [12-14] 12  MAP:  [63 mmHg-174 mmHg] 70 mmHg  Arterial Line BP: ()/(46-82) 79/63  FiO2 (%):  [40 %] 40 %  SpO2:  [98 %-100 %] 99 %    Ventilation Mode: CMV/AC  (Continuous Mandatory Ventilation/ Assist Control)  FiO2 (%): 40 %  Rate Set (breaths/minute): 12 breaths/min  Tidal Volume Set (mL): 500 mL  PEEP (cm H2O): 5 cmH2O  Oxygen Concentration (%): 40 %  Resp: 12        2. INTAKE/ OUTPUT:   I/O last 3 completed shifts:  In: 5958.66 [I.V.:3423.66; NG/GT:375]  Out: 4062 [Urine:1352; Emesis/NG output:700; Chest Tube:2010]    3. PHYSICAL EXAMINATION:   General: intubated, sedated  Neuro: sedated, coughs with oral cares  Resp: MV  CV: RRR  Abdomen: Soft, Non-distended, Non-tender  Incisions: c/d/i, CT with serosanguinous ouput  Extremities: warm and well perfused, 2+ edema    4. INVESTIGATIONS:   Arterial Blood Gases     Recent Labs   Lab 02/20/20  0415 02/19/20  2335 02/19/20  1941   WBC 9.4 7.9 7.6   HGB 9.1* 9.1* 9.2*    158 152   INR  --  1.31* 1.31*     --  143   POTASSIUM 3.9 4.0 3.7   BUN 34* 32* 34*   CR 1.73* 1.71* 1.66*           5. RADIOLOGY:     =========================================

## 2020-02-20 NOTE — PLAN OF CARE
Moves all extremities. Not following commands. Unable to decrease sedation d/t vasovagal response with coughing and agitation, MAP's as low as 40's during these episodes. Weaned off levo. Epi 0.02 mcg/kg/min, dobutamine 7.5 mcg/kg/min. Weaned nitric to 25 ppm per orders (5 ppm q4h), PAP remains 32-34/12, CVP 8. Continue with POC. Notify CVTS/Cards 2 with concerns.

## 2020-02-20 NOTE — PLAN OF CARE
OT 4E: Cancel - pt remains intubated/sedated and not medically appropriate for therapies. Will reschedule.

## 2020-02-20 NOTE — PROGRESS NOTES
CVTS Progress Note  2/18/2020      CO-MORBIDITIES:   Patient Active Problem List   Diagnosis     Acute on chronic systolic congestive heart failure (H)     Anxiety     CKD (chronic kidney disease) stage 3, GFR 30-59 ml/min (H)     Coronary artery disease involving native coronary artery of native heart without angina pectoris     GERD (gastroesophageal reflux disease)     Gout     Hyperlipidemia with target LDL less than 100     Nonischemic cardiomyopathy (H)     Non-nephrotic range proteinuria     ABHINAV on CPAP     Type 2 diabetes mellitus with stage 3 chronic kidney disease, without long-term current use of insulin (H)     Heart failure (H)     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 Redd on 2/18 for chronic systolic heart failure 2/2 NIC.    PLAN FOR TODAY:  - wean Jeramy per cardiology  - decrease sedation  - wait on PST until Jeramy is off  - remove arterial sheath, then start bivalirudin  - discuss diuresis with cardiology  - NJ for TF    PLAN:  Neuro/ pain/ sedation:  - Monitor neurological status. Notify the MD for any acute changes in exam.  - Fentanyl gtt for pain.  - Propofol gtt for sedation.  - continue home fluoxetine    Pulmonary care:   #COPD  - MV  - Titrate FiO2 for SpO2 >92%  - ABHINAV on CPAP, order CPAP while sleeping once extubated    Cardiovascular:    #Moderate CAD ([facility] 11/2019 nonobstructive CAD w/ severe branch disease)  #CHF EF 15-20% s/p LVAD placement  #Vfib this admission, s/p amio load  - Monitor hemodynamic status.   - MAP >65  - Hold PTA entresto, spironolactone  - continue ASA 81 mg, atorvastatin  - continue amio 400 mg BID  - Jeramy wean    GI care:   - NPO except ice chips and medications.  - NJ placement today    Fluids/ Electrolytes/ Nutrition:   - TKO for IV fluid hydration  - No indication for parenteral nutrition.    Renal/ Fluid  Balance:    #CKD III  - Urine output is adequate so far.  - Will continue to monitor intake and output.    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   - Bivalirudin (Hx of HIT) later today  - Protonix qd    Lines/ tubes/ drains:  - MAC, [place], Arterial line, LIJ dialysis line, lombardo    Disposition:  - CV ICU.     Patient seen, findings and plan discussed with CVTS fellow.    Scott Giron MD  CA-3/PGY-4 Anesthesiology  [phone]      ====================================    PAST MEDICAL HISTORY: No past medical history on file.    PAST SURGICAL HISTORY:   Past Surgical History:   Procedure Laterality Date     CV CENTRAL VENOUS CATHETER PLACEMENT N/A 2/13/2020    Procedure: Central Venous Catheter Placement;  Surgeon: Chente Moss MD;  Location:  HEART CARDIAC CATH LAB     CV INTRA-AORTIC BALLOON PUMP INSERTION N/A 2/7/2020    Procedure: Intra-Aortic Balloon Pump Insertion;  Surgeon: Jose Baldwin MD;  Location:  HEART CARDIAC CATH LAB     CV INTRA-AORTIC BALLOON PUMP INSERTION N/A 2/13/2020    Procedure: Intra-Aortic Balloon Pump Insertion;  Surgeon: Chente Moss MD;  Location:  HEART CARDIAC CATH LAB     CV SWAN LUCIANA PROCEDURE N/A 2/13/2020    Procedure: [facility] [place] Procedure;  Surgeon: Chente Moss MD;  Location:  HEART CARDIAC CATH LAB       FAMILY HISTORY: No family history  on file.    SOCIAL HISTORY:   Social History     Tobacco Use     Smoking status: Not on file   Substance Use Topics     Alcohol use: Not on file         OBJECTIVE:   1. VITAL SIGNS:   Temp:  [96.8  F (36  C)-99.1  F (37.3  C)] 99.1  F (37.3  C)  Heart Rate:  [] 88  Resp:  [12-14] 12  MAP:  [63 mmHg-174 mmHg] 70 mmHg  Arterial Line BP: ()/(46-82) 79/63  FiO2 (%):  [40 %] 40 %  SpO2:  [98 %-100 %] 99 %    Ventilation Mode: CMV/AC  (Continuous Mandatory Ventilation/ Assist Control)  FiO2 (%): 40 %  Rate Set (breaths/minute): 12 breaths/min  Tidal Volume Set (mL): 500 mL  PEEP (cm H2O): 5 cmH2O  Oxygen Concentration (%): 40 %  Resp: 12        2. INTAKE/ OUTPUT:   I/O last 3 completed shifts:  In: 5958.66 [I.V.:3423.66; NG/GT:375]  Out: 4062 [Urine:1352; Emesis/NG output:700; Chest Tube:2010]    3. PHYSICAL EXAMINATION:   General: intubated, sedated  Neuro: sedated, coughs with oral cares  Resp: MV  CV: RRR  Abdomen: Soft, Non-distended, Non-tender  Incisions: c/d/i, CT with serosanguinous ouput  Extremities: warm and well perfused, 2+ edema    4. INVESTIGATIONS:   Arterial Blood Gases     Recent Labs   Lab 02/20/20  0415 02/19/20  2335 02/19/20  1941   WBC 9.4 7.9 7.6   HGB 9.1* 9.1* 9.2*    158 152   INR  --  1.31* 1.31*     --  143   POTASSIUM 3.9 4.0 3.7   BUN 34* 32* 34*   CR 1.73* 1.71* 1.66*           5. RADIOLOGY:     =========================================

## 2020-02-20 NOTE — PROGRESS NOTES
Advanced Heart Failure Consult Progress Note  Eliseo Tanner MRN: [id number]  Age: 64 year old, : 1955  Date: 2020              Assessment and Plan:     Mr Eliseo Tanner is a 63yo M w/PMHx notable for chronic systolic heart failure, NICM, moderate CAD, HTN, ABHINAV on CPAP, DM2, CKDIII who is transferred to [facility] for evaluation and management of advanced heart failure with arrhythmia now s/p HM 3 on .    Today's plan ()  -Wean NO first, then  -Wean pressors as tolerated for MAPs >65 mmHg     Moderate CAD  Severe Mitral regurgitation  Chronic nonischemic systolic heart failure w/ reduced EF (15-20%) and exacerbation  NYHA Class III. Echo 2020: LVEF 15-20%; LVEDd 5.9 cm; 4+ MR. [facility] 2019 w/ nonobstructive CAD w/ severe branch disease. Presented with increased wt gain, SOB, LE edema concerning for HF exacerbation, initially concerning for cardiogenic shock. Admitted to [facility] for further evaluation regarding need for advanced HF therapies. Patient is now s/p HM III placement on  with early post-op course complicated by bleeding that is slowing down as of  AM. Chest tube output has decreased from about 200 to 100 ml/hr.  -Trend Hbg q6h and transfuse for Hgb <7.0  -Monitor fibrinogen, INR, platelets  -Weaning NPO on   -Continue ASA 81, atorvastatin 20 mg    Proteus and Enterococcus bacteremia  Organisms growing from 2/2 blood cultures from . Blood cultured from  NGTD and 2/15 growing 1/2 S.epi, likely contaminant per ID.  ID consulted, appreciate help.  -daily blood cultures until negative  -Zosyn (-)  -Tobramycin (-)  -Vancomycin (-  -Meropenem (-2/15)  -Ceftriaxone (-  -Ampicillin (-    Precapillary pulmonary hypertension:  Significant transpulmonary gradient of 14 on right heart cath numbers. May be related to CTEPH vs. WHO I.    #Thrombocytopenia:  Concern for HIT given timing with respect to heparin exposure.  "HIT positive DAVINA negative. Antibody is now negative x2, thus no further indication for PLAX  -Heme consulted  -Bivalirudin gtt after LVAD surgery once bleeding has subsided    VFib requiring shock  Shocked x 3 prior to transfer, loaded with amio. No known prior history. Suspect related to underlying HF.   -Keep K>4, Mg>2  -Amio 400 mg PO QD  -Need ICD for secondary prevention     Chronic:  ABHINAV: Home CPAP  Gout: PTA allopurinol 200 daily  MDD: PTA fluoxetine  GERD: PTA PPI  COPD: albuterol PRN    FEN:  2gm Na   2L water restriction    PPX: Holding given post-op bleeding    CODE: FULL CODE     Stanford Acuna M.D.  Cardiology fellow             Subjective/Interval Events     No acute overnight events. This AM, no further bleeding from chest tube site. Intubated and sedated.          Objective     BP (!) 72/53   Pulse 98   Temp 99.1  F (37.3  C) (Pulmonary Artery)   Resp 12   Ht 1.702 m (5' 7\")   Wt 101.4 kg (223 lb 8.7 oz)   SpO2 99%   BMI 35.01 kg/m    Temp:  [96.8  F (36  C)-99.1  F (37.3  C)] 99.1  F (37.3  C)  Heart Rate:  [] 88  Resp:  [12-14] 12  MAP:  [63 mmHg-174 mmHg] 70 mmHg  Arterial Line BP: ()/(49-82) 79/63  FiO2 (%):  [40 %] 40 %  SpO2:  [98 %-100 %] 99 %  Wt Readings from Last 2 Encounters:   02/20/20 101.4 kg (223 lb 8.7 oz)     I/O last 3 completed shifts:  In: 5958.66 [I.V.:3423.66; NG/GT:375]  Out: 4062 [Urine:1352; Emesis/NG output:700; Chest Tube:2010]      Gen: No acute distress, but intubated and sedated  HEENT: NC/AT  PULM/THORAX: mechanical breath sounds, but no wheezes; chest tubes in place with no bleeding  CV: normal rate, regular rhythm, normal S1 and S2, LVAD hum  ABD: Soft, NTND, bowel sounds present, no masses  EXT: WWP. No LE edema, clubbing or cyanosis.  NEURO: sedated                Data:     Recent Results (from the past 24 hour(s))   Blood gas venous and oxyhgb    Collection Time: 02/19/20 12:04 PM   Result Value Ref Range    Ph Venous 7.42 7.32 - 7.43 pH    PCO2 " Venous 47 40 - 50 mm Hg    PO2 Venous 26 25 - 47 mm Hg    Bicarbonate Venous 31 (H) 21 - 28 mmol/L    FIO2 40     Oxyhemoglobin Venous 49 %    Base Excess Venous 5.2 mmol/L   Magnesium    Collection Time: 02/19/20 12:04 PM   Result Value Ref Range    Magnesium 2.0 1.6 - 2.3 mg/dL   Potassium    Collection Time: 02/19/20 12:04 PM   Result Value Ref Range    Potassium 4.1 3.4 - 5.3 mmol/L   Phosphorus    Collection Time: 02/19/20 12:04 PM   Result Value Ref Range    Phosphorus 5.0 (H) 2.5 - 4.5 mg/dL   Calcium ionized whole blood    Collection Time: 02/19/20 12:04 PM   Result Value Ref Range    Calcium Ionized Whole Blood 4.1 (L) 4.4 - 5.2 mg/dL   Potassium whole blood    Collection Time: 02/19/20 12:04 PM   Result Value Ref Range    Potassium 4.0 3.4 - 5.3 mmol/L   Glucose by meter    Collection Time: 02/19/20 12:05 PM   Result Value Ref Range    Glucose 144 (H) 70 - 99 mg/dL   Glucose by meter    Collection Time: 02/19/20  2:15 PM   Result Value Ref Range    Glucose 130 (H) 70 - 99 mg/dL   CBC with platelets    Collection Time: 02/19/20  2:16 PM   Result Value Ref Range    WBC 7.1 4.0 - 11.0 10e9/L    RBC Count 2.54 (L) 4.4 - 5.9 10e12/L    Hemoglobin 7.4 (L) 13.3 - 17.7 g/dL    Hematocrit 21.9 (L) 40.0 - 53.0 %    MCV 86 78 - 100 fl    MCH 29.1 26.5 - 33.0 pg    MCHC 33.8 31.5 - 36.5 g/dL    RDW 14.1 10.0 - 15.0 %    Platelet Count 140 (L) 150 - 450 10e9/L   INR    Collection Time: 02/19/20  2:16 PM   Result Value Ref Range    INR 1.40 (H) 0.86 - 1.14   Fibrinogen activity (AM Draw)    Collection Time: 02/19/20  2:16 PM   Result Value Ref Range    Fibrinogen 247 200 - 420 mg/dL   Glucose by meter    Collection Time: 02/19/20  4:09 PM   Result Value Ref Range    Glucose 128 (H) 70 - 99 mg/dL   Basic metabolic panel    Collection Time: 02/19/20  4:10 PM   Result Value Ref Range    Sodium 144 133 - 144 mmol/L    Potassium 3.8 3.4 - 5.3 mmol/L    Chloride 109 94 - 109 mmol/L    Carbon Dioxide 30 20 - 32 mmol/L    Anion  Gap 5 3 - 14 mmol/L    Glucose 129 (H) 70 - 99 mg/dL    Urea Nitrogen 35 (H) 7 - 30 mg/dL    Creatinine 1.71 (H) 0.66 - 1.25 mg/dL    GFR Estimate 41 (L) >60 mL/min/[1.73_m2]    GFR Estimate If Black 47 (L) >60 mL/min/[1.73_m2]    Calcium 7.6 (L) 8.5 - 10.1 mg/dL   CBC with platelets    Collection Time: 02/19/20  4:10 PM   Result Value Ref Range    WBC 7.6 4.0 - 11.0 10e9/L    RBC Count 3.00 (L) 4.4 - 5.9 10e12/L    Hemoglobin 8.7 (L) 13.3 - 17.7 g/dL    Hematocrit 25.7 (L) 40.0 - 53.0 %    MCV 86 78 - 100 fl    MCH 29.0 26.5 - 33.0 pg    MCHC 33.9 31.5 - 36.5 g/dL    RDW 14.5 10.0 - 15.0 %    Platelet Count 149 (L) 150 - 450 10e9/L   Lactic acid whole blood    Collection Time: 02/19/20  4:10 PM   Result Value Ref Range    Lactic Acid 1.1 0.7 - 2.0 mmol/L   Blood gas arterial    Collection Time: 02/19/20  4:10 PM   Result Value Ref Range    pH Arterial 7.50 (H) 7.35 - 7.45 pH    pCO2 Arterial 38 35 - 45 mm Hg    pO2 Arterial 143 (H) 80 - 105 mm Hg    Bicarbonate Arterial 30 (H) 21 - 28 mmol/L    Base Excess Art 6.0 mmol/L    FIO2 40    Blood gas venous with oxyhemoglobin (PCU Collect TBD)    Collection Time: 02/19/20  4:10 PM   Result Value Ref Range    Ph Venous 7.46 (H) 7.32 - 7.43 pH    PCO2 Venous 43 40 - 50 mm Hg    PO2 Venous 31 25 - 47 mm Hg    Bicarbonate Venous 31 (H) 21 - 28 mmol/L    FIO2 40     Oxyhemoglobin Venous 60 %    Base Excess Venous 6.4 mmol/L   Glucose by meter    Collection Time: 02/19/20  6:02 PM   Result Value Ref Range    Glucose 107 (H) 70 - 99 mg/dL   Glucose by meter    Collection Time: 02/19/20  7:00 PM   Result Value Ref Range    Glucose 117 (H) 70 - 99 mg/dL   CBC (1200)    Collection Time: 02/19/20  7:41 PM   Result Value Ref Range    WBC 7.6 4.0 - 11.0 10e9/L    RBC Count 3.17 (L) 4.4 - 5.9 10e12/L    Hemoglobin 9.2 (L) 13.3 - 17.7 g/dL    Hematocrit 27.1 (L) 40.0 - 53.0 %    MCV 86 78 - 100 fl    MCH 29.0 26.5 - 33.0 pg    MCHC 33.9 31.5 - 36.5 g/dL    RDW 14.3 10.0 - 15.0 %     Platelet Count 152 150 - 450 10e9/L   Basic metabolic panel    Collection Time: 02/19/20  7:41 PM   Result Value Ref Range    Sodium 143 133 - 144 mmol/L    Potassium 3.7 3.4 - 5.3 mmol/L    Chloride 110 (H) 94 - 109 mmol/L    Carbon Dioxide 31 20 - 32 mmol/L    Anion Gap 3 3 - 14 mmol/L    Glucose 119 (H) 70 - 99 mg/dL    Urea Nitrogen 34 (H) 7 - 30 mg/dL    Creatinine 1.66 (H) 0.66 - 1.25 mg/dL    GFR Estimate 42 (L) >60 mL/min/[1.73_m2]    GFR Estimate If Black 49 (L) >60 mL/min/[1.73_m2]    Calcium 7.4 (L) 8.5 - 10.1 mg/dL   Lactic acid whole blood    Collection Time: 02/19/20  7:41 PM   Result Value Ref Range    Lactic Acid 0.8 0.7 - 2.0 mmol/L   INR    Collection Time: 02/19/20  7:41 PM   Result Value Ref Range    INR 1.31 (H) 0.86 - 1.14   PTT (AM Draw)    Collection Time: 02/19/20  7:41 PM   Result Value Ref Range    PTT 36 22 - 37 sec   Blood gas arterial    Collection Time: 02/19/20  7:41 PM   Result Value Ref Range    pH Arterial 7.46 (H) 7.35 - 7.45 pH    pCO2 Arterial 41 35 - 45 mm Hg    pO2 Arterial 134 (H) 80 - 105 mm Hg    Bicarbonate Arterial 29 (H) 21 - 28 mmol/L    Base Excess Art 5.0 mmol/L    FIO2 40    Magnesium    Collection Time: 02/19/20  7:41 PM   Result Value Ref Range    Magnesium 2.4 (H) 1.6 - 2.3 mg/dL   Phosphorus    Collection Time: 02/19/20  7:41 PM   Result Value Ref Range    Phosphorus 4.6 (H) 2.5 - 4.5 mg/dL   Blood gas venous with oxyhemoglobin (PCU Collect TBD)    Collection Time: 02/19/20  7:41 PM   Result Value Ref Range    Ph Venous 7.43 7.32 - 7.43 pH    PCO2 Venous 43 40 - 50 mm Hg    PO2 Venous 33 25 - 47 mm Hg    Bicarbonate Venous 29 (H) 21 - 28 mmol/L    FIO2 40     Oxyhemoglobin Venous 65 %    Base Excess Venous 4.0 mmol/L   Methicillin Resistant Staph Aureus PCR    Collection Time: 02/19/20  7:46 PM   Result Value Ref Range    Specimen Description Nares     Methicillin Resist/Sens S. aureus PCR Negative NEG^Negative   Glucose by meter    Collection Time: 02/19/20   8:00 PM   Result Value Ref Range    Glucose 113 (H) 70 - 99 mg/dL   Glucose by meter    Collection Time: 02/19/20  9:17 PM   Result Value Ref Range    Glucose 111 (H) 70 - 99 mg/dL   Glucose by meter    Collection Time: 02/19/20 11:07 PM   Result Value Ref Range    Glucose 121 (H) 70 - 99 mg/dL   Urea nitrogen    Collection Time: 02/19/20 11:35 PM   Result Value Ref Range    Urea Nitrogen 32 (H) 7 - 30 mg/dL   Creatinine    Collection Time: 02/19/20 11:35 PM   Result Value Ref Range    Creatinine 1.71 (H) 0.66 - 1.25 mg/dL    GFR Estimate 41 (L) >60 mL/min/[1.73_m2]    GFR Estimate If Black 47 (L) >60 mL/min/[1.73_m2]   INR    Collection Time: 02/19/20 11:35 PM   Result Value Ref Range    INR 1.31 (H) 0.86 - 1.14   Partial thromboplastin time    Collection Time: 02/19/20 11:35 PM   Result Value Ref Range    PTT 35 22 - 37 sec   CBC with platelets    Collection Time: 02/19/20 11:35 PM   Result Value Ref Range    WBC 7.9 4.0 - 11.0 10e9/L    RBC Count 3.20 (L) 4.4 - 5.9 10e12/L    Hemoglobin 9.1 (L) 13.3 - 17.7 g/dL    Hematocrit 27.2 (L) 40.0 - 53.0 %    MCV 85 78 - 100 fl    MCH 28.4 26.5 - 33.0 pg    MCHC 33.5 31.5 - 36.5 g/dL    RDW 14.6 10.0 - 15.0 %    Platelet Count 158 150 - 450 10e9/L   Potassium    Collection Time: 02/19/20 11:35 PM   Result Value Ref Range    Potassium 4.0 3.4 - 5.3 mmol/L   Glucose by meter    Collection Time: 02/19/20 11:39 PM   Result Value Ref Range    Glucose 118 (H) 70 - 99 mg/dL   Glucose by meter    Collection Time: 02/20/20  3:19 AM   Result Value Ref Range    Glucose 128 (H) 70 - 99 mg/dL   Blood gas venous with oxyhemoglobin (AM Draw)    Collection Time: 02/20/20  4:07 AM   Result Value Ref Range    Ph Venous 7.43 7.32 - 7.43 pH    PCO2 Venous 42 40 - 50 mm Hg    PO2 Venous 35 25 - 47 mm Hg    Bicarbonate Venous 28 21 - 28 mmol/L    FIO2 40.0     Oxyhemoglobin Venous 64 %    Base Excess Venous 2.9 mmol/L   Blood gas arterial and oxyhgb    Collection Time: 02/20/20  4:07 AM    Result Value Ref Range    pH Arterial 7.46 (H) 7.35 - 7.45 pH    pCO2 Arterial 41 35 - 45 mm Hg    pO2 Arterial 140 (H) 80 - 105 mm Hg    Bicarbonate Arterial 29 (H) 21 - 28 mmol/L    FIO2 40.0     Oxyhemoglobin Arterial 97 92 - 100 %    Base Excess Art 4.7 mmol/L   Glucose by meter    Collection Time: 02/20/20  4:09 AM   Result Value Ref Range    Glucose 119 (H) 70 - 99 mg/dL   Basic metabolic panel    Collection Time: 02/20/20  4:15 AM   Result Value Ref Range    Sodium 144 133 - 144 mmol/L    Potassium 3.9 3.4 - 5.3 mmol/L    Chloride 111 (H) 94 - 109 mmol/L    Carbon Dioxide 28 20 - 32 mmol/L    Anion Gap 5 3 - 14 mmol/L    Glucose 136 (H) 70 - 99 mg/dL    Urea Nitrogen 34 (H) 7 - 30 mg/dL    Creatinine 1.73 (H) 0.66 - 1.25 mg/dL    GFR Estimate 40 (L) >60 mL/min/[1.73_m2]    GFR Estimate If Black 46 (L) >60 mL/min/[1.73_m2]    Calcium 7.5 (L) 8.5 - 10.1 mg/dL   CBC with platelets    Collection Time: 02/20/20  4:15 AM   Result Value Ref Range    WBC 9.4 4.0 - 11.0 10e9/L    RBC Count 3.19 (L) 4.4 - 5.9 10e12/L    Hemoglobin 9.1 (L) 13.3 - 17.7 g/dL    Hematocrit 27.3 (L) 40.0 - 53.0 %    MCV 86 78 - 100 fl    MCH 28.5 26.5 - 33.0 pg    MCHC 33.3 31.5 - 36.5 g/dL    RDW 15.0 10.0 - 15.0 %    Platelet Count 174 150 - 450 10e9/L   Partial thromboplastin time    Collection Time: 02/20/20  4:15 AM   Result Value Ref Range    PTT 32 22 - 37 sec   Magnesium    Collection Time: 02/20/20  4:15 AM   Result Value Ref Range    Magnesium 2.3 1.6 - 2.3 mg/dL   Phosphorus    Collection Time: 02/20/20  4:15 AM   Result Value Ref Range    Phosphorus 4.2 2.5 - 4.5 mg/dL   Glucose by meter    Collection Time: 02/20/20  6:07 AM   Result Value Ref Range    Glucose 118 (H) 70 - 99 mg/dL   Glucose by meter    Collection Time: 02/20/20  8:19 AM   Result Value Ref Range    Glucose 115 (H) 70 - 99 mg/dL   Glucose by meter    Collection Time: 02/20/20 10:03 AM   Result Value Ref Range    Glucose 124 (H) 70 - 99 mg/dL       Recent  Results (from the past 24 hour(s))   Echocardiogram Complete    Narrative    [id number]  [id number]  [id number]  [id number]^[id number]^JIM           [facility],[place]  Echocardiography Laboratory  [address] [place] [id number]     Name: WOLFGANG LEACH  MRN: [id number]  : 1955  Study Date: 2020 10:06 AM  Age: 64 yrs  Gender: Male  Patient Location: [facility]  Reason For Study: Heart Failure  Ordering Physician: NAHUN GUTIERREZ  Referring Physician: SELF, REFERRED  Performed By: Yvonne Adan RDCS     BSA: 2.2 m2  Height: 67 in  Weight: 244 lb  HR: 103  BP: 72/52 mmHg  _____________________________________________________________________________  __        Procedure  Complete Portable Echo Adult. Contrast Optison. Optison (NDC #6391-8112-39)  given intravenously. Patient was given 6 ml mixture of 3 ml Optison and 6 ml  saline. 3 ml wasted.  _____________________________________________________________________________  __        Interpretation Summary  Left ventricular size is normal.  LVEF 20% based on biplane 2D tracing.  Mild to moderate right ventricular dilation is present.  Global right ventricular function is moderately reduced.  Pulmonary artery systolic pressure is normal.  Dilation of the inferior vena cava is present with abnormal respiratory  variation in diameter.  _____________________________________________________________________________  __        Left Ventricle  Left ventricular size is normal. LVEF 20% based on biplane 2D tracing. Left  ventricular wall thickness is normal. Diastolic function not assessed due to  frequent ectopy. Abnormal septal motion consistent with left bundle branch  block is present.     Right Ventricle  Mild to moderate right ventricular dilation is present. Global right  ventricular function is moderately reduced.     Atria  Mild biatrial enlargement is present.     Mitral Valve  Moderate mitral insufficiency is present.         Aortic Valve  Aortic valve is normal in structure and function.     Tricuspid Valve  Moderate tricuspid insufficiency is present. The right ventricular systolic  pressure is approximated at 24.4 mmHg plus the right atrial pressure.  Pulmonary artery systolic pressure is normal.     Pulmonic Valve  The pulmonic valve cannot be assessed. Mild pulmonic insufficiency is present.     Vessels  The aorta root is normal. The pulmonary artery cannot be assessed. Dilation of  the inferior vena cava is present with abnormal respiratory variation in  diameter.     Compared to Previous Study  Previous study not available for comparison.     _____________________________________________________________________________  __  MMode/2D Measurements & Calculations  IVSd: 0.61 cm  LVIDd: 4.7 cm  LVIDs: 3.7 cm  LVPWd: 0.75 cm  FS: 21.7 %  LV mass(C)d: 99.1 grams  LV mass(C)dI: 45.0 grams/m2     Ao root diam: 2.9 cm  asc Aorta Diam: 2.5 cm  LVOT diam: 1.9 cm  LVOT area: 2.8 cm2     EF(MOD-bp): 21.5 %  LA Volume (BP): 84.8 ml  LA Volume Index (BP): 38.5 ml/m2  RWT: 0.32        Doppler Measurements & Calculations  PA acc time: 0.09 sec     PI end-d saumya: 154.0 cm/sec  TR max saumya: 247.0 cm/sec  TR max P.4 mmHg     _____________________________________________________________________________  __           Report approved by: Graciela Tomlinson 2020 12:05 PM      XR Chest Port 1 View    Narrative    XR CHEST PORT 1 VW  2020 4:21 PM      HISTORY: SG Placement    COMPARISON: None available    FINDINGS: Single AP chest radiograph. [place]-Chucky catheter tip is in the  proximal right main pulmonary artery. Right central line tip and right  upper extremity PICC line tip at the lower SVC. Trachea is midline,  cardiomegaly. Bilateral perihilar streaky opacities. Mild increased  interstitial opacities in the right lung with ill-defined pulmonary  vascularity. No pneumothorax or pleural effusion. No acute osseous or  abdominal abnormality.  Elevated left hemidiaphragm.      Impression    IMPRESSION:  1. [place]-Chucky catheter tip at the proximal right main pulmonary artery.  2. Cardiomegaly with mild pulmonary edema, especially on the right.    I have personally reviewed the examination and initial interpretation  and I agree with the findings.    DONG SOLORIO MD   Cardiac Catheterization    Narrative      Successful insertion of a IABP in the RFA                Medications     Current Facility-Administered Medications   Medication     acetaminophen (TYLENOL) tablet 650 mg     albuterol (PROAIR HFA/PROVENTIL HFA/VENTOLIN HFA) 108 (90 Base) MCG/ACT inhaler 2 puff     allopurinol (ZYLOPRIM) tablet 200 mg     amiodarone (PACERONE) tablet 400 mg     ampicillin (OMNIPEN) 2 g vial to attach to  ml bag     aspirin (ASA) chewable tablet 81 mg     atorvastatin (LIPITOR) tablet 20 mg     bisacodyl (DULCOLAX) EC tablet 5 mg    Or     bisacodyl (DULCOLAX) EC tablet 10 mg    Or     bisacodyl (DULCOLAX) EC tablet 15 mg     calcium carbonate (TUMS) chewable tablet 500 mg     cefTRIAXone (ROCEPHIN) 2 g vial to attach to  ml bag for ADULTS or NS 50 ml bag for PEDS     co-enzyme Q-10 capsule 200 mg     dextrose 10% infusion     glucose gel 15-30 g    Or     dextrose 50 % injection 25-50 mL    Or     glucagon injection 1 mg     diclofenac (VOLTAREN) 1 % topical gel 4 g     DOBUTamine (DOBUTREX) 1,000 mg in D5W 250 mL infusion (adult max conc)     EPINEPHrine (ADRENALIN) 5 mg in sodium chloride 0.9 % 250 mL infusion     fentaNYL (PF) (SUBLIMAZE) injection 25 mcg     fentaNYL (SUBLIMAZE) infusion     fluconazole (DIFLUCAN) intermittent infusion 200 mg     FLUoxetine (PROzac) capsule 20 mg     hydrALAZINE (APRESOLINE) injection 10 mg     insulin 1 unit/mL in saline (NovoLIN, HumuLIN Regular) drip - ADULT IV Infusion     levofloxacin (LEVAQUIN) infusion 500 mg     lidocaine (LMX4) cream     lidocaine (XYLOCAINE) 2 % solution 5 mL     lidocaine 1 % 0.1-1 mL      magnesium oxide (MAG-OX) tablet 400 mg     magnesium sulfate 2 g in water intermittent infusion     magnesium sulfate 4 g in 100 mL sterile water (premade)     medication instruction     melatonin tablet 3 mg     meperidine (DEMEROL) injection 12.5-25 mg     mupirocin (BACTROBAN) 2 % ointment 1 g     naloxone (NARCAN) injection 0.1-0.4 mg     norepinephrine (LEVOPHED) 16 mg in  mL infusion     pantoprazole (PROTONIX) 40 mg IV push injection     polyethylene glycol (MIRALAX) Packet 17 g     potassium chloride (KLOR-CON) Packet 20-40 mEq     potassium chloride 10 mEq in 100 mL intermittent infusion with 10 mg lidocaine     potassium chloride 10 mEq in 100 mL sterile water intermittent infusion (premix)     potassium chloride 20 mEq in 50 mL intermittent infusion     potassium chloride ER (KLOR-CON M) CR tablet 20-40 mEq     propofol (DIPRIVAN) infusion    And     propofol (DIPRIVAN) injection 10 mg/mL vial     Reason beta blocker order not selected     rifampin (RIFADIN) 600 mg in sodium chloride 0.9 % 100 mL intermittent infusion     senna-docusate (SENOKOT-S/PERICOLACE) 8.6-50 MG per tablet 1 tablet    Or     senna-docusate (SENOKOT-S/PERICOLACE) 8.6-50 MG per tablet 2 tablet     sodium chloride (PF) 0.9% PF flush 3 mL     sodium chloride (PF) 0.9% PF flush 3 mL     vancomycin 1500 mg in 0.9% NaCl 250 ml intermittent infusion 1,500 mg     vasopressin (VASOSTRICT) 40 Units in D5W 40 mL infusion     vitamin B1 (THIAMINE) tablet 100 mg

## 2020-02-20 NOTE — CONSULTS
CORE consult not completed for this patient d/t  VAD surgery . Post VAD surgery and recovery patient will follow-up with Heart Failure Cardiologists and VAD coordinators, not requiring CORE clinic. Thank you for the consideration of a CORE clinic consult.

## 2020-02-20 NOTE — PLAN OF CARE
Major Shift Events:  CT output initially 200-300/hr, after blood products decreased to 30-70/hr. Total of 4 units pRBCs, 1 of cryo, 2 FFP and 1 platelet given. SVO2 from 39 to 60%.  Vaso gtt weaned off, epi and levo weaned down. Dobutamine remains at 7.5. Fentanyl and propofol continue. UO remains 50-70mL/hr. K, Mag and Ca gluc replaced.   Plan: Wean NO (2/20) then wean sedation and vent settings as able. Monitor H&H.   For vital signs and complete assessments, please see documentation flowsheets.     Problem: Adult Inpatient Plan of Care  Goal: Rounds/Family Conference  Outcome: No Change     Problem: Adjustment to Device (Ventricular Assist Device)  Goal: Optimal Adjustment to Device  Outcome: No Change     Problem: Right-Sided Heart Compromise (Ventricular Assist Device)  Goal: Effective Right-Sided Heart Function  Outcome: No Change     Problem: Cardiac Disease Comorbidity  Goal: Cardiac Disease  Description  Patient comorbidity will be monitored for signs and symptoms of Cardiac Disease.  Problems will be absent, minimized or managed by discharge/transition of care.  Outcome: Improving     Problem: Diabetes Comorbidity  Goal: Blood Glucose Level Within Desired Range  Outcome: Improving     Problem: Heart Failure Comorbidity  Goal: Maintenance of Heart Failure Symptom Control  Outcome: Improving     Problem: Cardiac Output Decreased  Goal: Effective Cardiac Output  Outcome: Improving     Problem: Bleeding (Ventricular Assist Device)  Goal: Absence of Bleeding  Outcome: Improving     Problem: Hemodynamic Instability (Ventricular Assist Device)  Goal: Optimal Blood Flow  Outcome: Improving     Problem: Infection (Ventricular Assist Device)  Goal: Absence of Infection Signs/Symptoms  Outcome: Improving

## 2020-02-21 ENCOUNTER — APPOINTMENT (OUTPATIENT)
Dept: GENERAL RADIOLOGY | Facility: CLINIC | Age: 65
DRG: 001 | End: 2020-02-21
Attending: INTERNAL MEDICINE
Payer: MEDICARE

## 2020-02-21 ENCOUNTER — APPOINTMENT (OUTPATIENT)
Dept: SPEECH THERAPY | Facility: CLINIC | Age: 65
DRG: 001 | End: 2020-02-21
Attending: INTERNAL MEDICINE
Payer: MEDICARE

## 2020-02-21 ENCOUNTER — APPOINTMENT (OUTPATIENT)
Dept: OCCUPATIONAL THERAPY | Facility: CLINIC | Age: 65
DRG: 001 | End: 2020-02-21
Attending: INTERNAL MEDICINE
Payer: MEDICARE

## 2020-02-21 ENCOUNTER — APPOINTMENT (OUTPATIENT)
Dept: GENERAL RADIOLOGY | Facility: CLINIC | Age: 65
DRG: 001 | End: 2020-02-21
Attending: STUDENT IN AN ORGANIZED HEALTH CARE EDUCATION/TRAINING PROGRAM
Payer: MEDICARE

## 2020-02-21 LAB
ALBUMIN SERPL-MCNC: 2.3 G/DL (ref 3.4–5)
ALP SERPL-CCNC: 64 U/L (ref 40–150)
ALT SERPL W P-5'-P-CCNC: 22 U/L (ref 0–70)
ANION GAP SERPL CALCULATED.3IONS-SCNC: 4 MMOL/L (ref 3–14)
ANION GAP SERPL CALCULATED.3IONS-SCNC: 6 MMOL/L (ref 3–14)
APTT PPP: 58 SEC (ref 22–37)
AST SERPL W P-5'-P-CCNC: 79 U/L (ref 0–45)
BACTERIA SPEC CULT: NO GROWTH
BASE EXCESS BLDA CALC-SCNC: 4.7 MMOL/L
BASE EXCESS BLDA CALC-SCNC: 4.9 MMOL/L
BASE EXCESS BLDA CALC-SCNC: 5.7 MMOL/L
BASE EXCESS BLDV CALC-SCNC: 4.8 MMOL/L
BASE EXCESS BLDV CALC-SCNC: 6.2 MMOL/L
BILIRUB DIRECT SERPL-MCNC: 0.6 MG/DL (ref 0–0.2)
BILIRUB SERPL-MCNC: 0.9 MG/DL (ref 0.2–1.3)
BUN SERPL-MCNC: 36 MG/DL (ref 7–30)
BUN SERPL-MCNC: 36 MG/DL (ref 7–30)
CALCIUM SERPL-MCNC: 7.6 MG/DL (ref 8.5–10.1)
CALCIUM SERPL-MCNC: 7.7 MG/DL (ref 8.5–10.1)
CHLORIDE SERPL-SCNC: 112 MMOL/L (ref 94–109)
CHLORIDE SERPL-SCNC: 114 MMOL/L (ref 94–109)
CO2 SERPL-SCNC: 29 MMOL/L (ref 20–32)
CO2 SERPL-SCNC: 30 MMOL/L (ref 20–32)
CREAT SERPL-MCNC: 1.8 MG/DL (ref 0.66–1.25)
CREAT SERPL-MCNC: 1.8 MG/DL (ref 0.66–1.25)
ERYTHROCYTE [DISTWIDTH] IN BLOOD BY AUTOMATED COUNT: 16.4 % (ref 10–15)
ERYTHROCYTE [DISTWIDTH] IN BLOOD BY AUTOMATED COUNT: 16.5 % (ref 10–15)
GFR SERPL CREATININE-BSD FRML MDRD: 38 ML/MIN/{1.73_M2}
GFR SERPL CREATININE-BSD FRML MDRD: 38 ML/MIN/{1.73_M2}
GLUCOSE BLDC GLUCOMTR-MCNC: 109 MG/DL (ref 70–99)
GLUCOSE BLDC GLUCOMTR-MCNC: 113 MG/DL (ref 70–99)
GLUCOSE BLDC GLUCOMTR-MCNC: 120 MG/DL (ref 70–99)
GLUCOSE BLDC GLUCOMTR-MCNC: 122 MG/DL (ref 70–99)
GLUCOSE BLDC GLUCOMTR-MCNC: 128 MG/DL (ref 70–99)
GLUCOSE BLDC GLUCOMTR-MCNC: 130 MG/DL (ref 70–99)
GLUCOSE BLDC GLUCOMTR-MCNC: 136 MG/DL (ref 70–99)
GLUCOSE BLDC GLUCOMTR-MCNC: 155 MG/DL (ref 70–99)
GLUCOSE BLDC GLUCOMTR-MCNC: 207 MG/DL (ref 70–99)
GLUCOSE SERPL-MCNC: 129 MG/DL (ref 70–99)
GLUCOSE SERPL-MCNC: 140 MG/DL (ref 70–99)
HCO3 BLD-SCNC: 29 MMOL/L (ref 21–28)
HCO3 BLD-SCNC: 29 MMOL/L (ref 21–28)
HCO3 BLD-SCNC: 30 MMOL/L (ref 21–28)
HCO3 BLDV-SCNC: 30 MMOL/L (ref 21–28)
HCO3 BLDV-SCNC: 32 MMOL/L (ref 21–28)
HCT VFR BLD AUTO: 26.5 % (ref 40–53)
HCT VFR BLD AUTO: 28 % (ref 40–53)
HGB BLD-MCNC: 8.5 G/DL (ref 13.3–17.7)
HGB BLD-MCNC: 8.9 G/DL (ref 13.3–17.7)
MAGNESIUM SERPL-MCNC: 2.4 MG/DL (ref 1.6–2.3)
MCH RBC QN AUTO: 28.8 PG (ref 26.5–33)
MCH RBC QN AUTO: 28.8 PG (ref 26.5–33)
MCHC RBC AUTO-ENTMCNC: 31.8 G/DL (ref 31.5–36.5)
MCHC RBC AUTO-ENTMCNC: 32.1 G/DL (ref 31.5–36.5)
MCV RBC AUTO: 90 FL (ref 78–100)
MCV RBC AUTO: 91 FL (ref 78–100)
O2/TOTAL GAS SETTING VFR VENT: 40 %
O2/TOTAL GAS SETTING VFR VENT: ABNORMAL %
OXYHGB MFR BLD: 97 % (ref 92–100)
OXYHGB MFR BLD: 98 % (ref 92–100)
OXYHGB MFR BLD: 98 % (ref 92–100)
OXYHGB MFR BLDV: 63 %
OXYHGB MFR BLDV: 64 %
PCO2 BLD: 40 MM HG (ref 35–45)
PCO2 BLD: 43 MM HG (ref 35–45)
PCO2 BLD: 43 MM HG (ref 35–45)
PCO2 BLDV: 45 MM HG (ref 40–50)
PCO2 BLDV: 48 MM HG (ref 40–50)
PH BLD: 7.45 PH (ref 7.35–7.45)
PH BLD: 7.45 PH (ref 7.35–7.45)
PH BLD: 7.47 PH (ref 7.35–7.45)
PH BLDV: 7.42 PH (ref 7.32–7.43)
PH BLDV: 7.43 PH (ref 7.32–7.43)
PLATELET # BLD AUTO: 219 10E9/L (ref 150–450)
PLATELET # BLD AUTO: 257 10E9/L (ref 150–450)
PO2 BLD: 114 MM HG (ref 80–105)
PO2 BLD: 140 MM HG (ref 80–105)
PO2 BLD: 141 MM HG (ref 80–105)
PO2 BLDV: 35 MM HG (ref 25–47)
PO2 BLDV: 35 MM HG (ref 25–47)
POTASSIUM SERPL-SCNC: 3.6 MMOL/L (ref 3.4–5.3)
POTASSIUM SERPL-SCNC: 3.8 MMOL/L (ref 3.4–5.3)
PROT SERPL-MCNC: 5.1 G/DL (ref 6.8–8.8)
RBC # BLD AUTO: 2.95 10E12/L (ref 4.4–5.9)
RBC # BLD AUTO: 3.09 10E12/L (ref 4.4–5.9)
SODIUM SERPL-SCNC: 146 MMOL/L (ref 133–144)
SODIUM SERPL-SCNC: 147 MMOL/L (ref 133–144)
SODIUM SERPL-SCNC: 149 MMOL/L (ref 133–144)
SPECIMEN SOURCE: NORMAL
WBC # BLD AUTO: 12.7 10E9/L (ref 4–11)
WBC # BLD AUTO: 14.2 10E9/L (ref 4–11)

## 2020-02-21 PROCEDURE — C9113 INJ PANTOPRAZOLE SODIUM, VIA: HCPCS | Performed by: STUDENT IN AN ORGANIZED HEALTH CARE EDUCATION/TRAINING PROGRAM

## 2020-02-21 PROCEDURE — 97110 THERAPEUTIC EXERCISES: CPT | Mod: GO

## 2020-02-21 PROCEDURE — 40000014 ZZH STATISTIC ARTERIAL MONITORING DAILY

## 2020-02-21 PROCEDURE — 44500 INTRO GASTROINTESTINAL TUBE: CPT

## 2020-02-21 PROCEDURE — 20000004 ZZH R&B ICU UMMC

## 2020-02-21 PROCEDURE — 25000132 ZZH RX MED GY IP 250 OP 250 PS 637: Mod: GY | Performed by: STUDENT IN AN ORGANIZED HEALTH CARE EDUCATION/TRAINING PROGRAM

## 2020-02-21 PROCEDURE — 80076 HEPATIC FUNCTION PANEL: CPT | Performed by: INTERNAL MEDICINE

## 2020-02-21 PROCEDURE — 25800030 ZZH RX IP 258 OP 636: Performed by: STUDENT IN AN ORGANIZED HEALTH CARE EDUCATION/TRAINING PROGRAM

## 2020-02-21 PROCEDURE — 92610 EVALUATE SWALLOWING FUNCTION: CPT | Mod: GN

## 2020-02-21 PROCEDURE — 00000146 ZZHCL STATISTIC GLUCOSE BY METER IP

## 2020-02-21 PROCEDURE — 92526 ORAL FUNCTION THERAPY: CPT | Mod: GN

## 2020-02-21 PROCEDURE — 25000128 H RX IP 250 OP 636: Performed by: STUDENT IN AN ORGANIZED HEALTH CARE EDUCATION/TRAINING PROGRAM

## 2020-02-21 PROCEDURE — 25000125 ZZHC RX 250: Performed by: STUDENT IN AN ORGANIZED HEALTH CARE EDUCATION/TRAINING PROGRAM

## 2020-02-21 PROCEDURE — 71045 X-RAY EXAM CHEST 1 VIEW: CPT

## 2020-02-21 PROCEDURE — 93005 ELECTROCARDIOGRAM TRACING: CPT

## 2020-02-21 PROCEDURE — 82805 BLOOD GASES W/O2 SATURATION: CPT | Performed by: STUDENT IN AN ORGANIZED HEALTH CARE EDUCATION/TRAINING PROGRAM

## 2020-02-21 PROCEDURE — 93010 ELECTROCARDIOGRAM REPORT: CPT | Performed by: INTERNAL MEDICINE

## 2020-02-21 PROCEDURE — 85027 COMPLETE CBC AUTOMATED: CPT | Performed by: INTERNAL MEDICINE

## 2020-02-21 PROCEDURE — 99291 CRITICAL CARE FIRST HOUR: CPT | Mod: GC | Performed by: INTERNAL MEDICINE

## 2020-02-21 PROCEDURE — 40000048 ZZH STATISTIC DAILY SWAN MONITORING

## 2020-02-21 PROCEDURE — 84295 ASSAY OF SERUM SODIUM: CPT | Performed by: STUDENT IN AN ORGANIZED HEALTH CARE EDUCATION/TRAINING PROGRAM

## 2020-02-21 PROCEDURE — 25000132 ZZH RX MED GY IP 250 OP 250 PS 637: Mod: GY

## 2020-02-21 PROCEDURE — 97165 OT EVAL LOW COMPLEX 30 MIN: CPT | Mod: GO

## 2020-02-21 PROCEDURE — 80048 BASIC METABOLIC PNL TOTAL CA: CPT | Performed by: INTERNAL MEDICINE

## 2020-02-21 PROCEDURE — 40000196 ZZH STATISTIC RAPCV CVP MONITORING

## 2020-02-21 PROCEDURE — 97530 THERAPEUTIC ACTIVITIES: CPT | Mod: GO

## 2020-02-21 PROCEDURE — 94003 VENT MGMT INPAT SUBQ DAY: CPT

## 2020-02-21 PROCEDURE — 40000275 ZZH STATISTIC RCP TIME EA 10 MIN

## 2020-02-21 PROCEDURE — 99232 SBSQ HOSP IP/OBS MODERATE 35: CPT | Performed by: ANESTHESIOLOGY

## 2020-02-21 PROCEDURE — 85730 THROMBOPLASTIN TIME PARTIAL: CPT | Performed by: INTERNAL MEDICINE

## 2020-02-21 PROCEDURE — 40000076 ZZH STATISTIC IABP MONITORING

## 2020-02-21 PROCEDURE — 25000128 H RX IP 250 OP 636

## 2020-02-21 PROCEDURE — 25000132 ZZH RX MED GY IP 250 OP 250 PS 637: Mod: GY | Performed by: INTERNAL MEDICINE

## 2020-02-21 PROCEDURE — 83735 ASSAY OF MAGNESIUM: CPT | Performed by: INTERNAL MEDICINE

## 2020-02-21 RX ORDER — GABAPENTIN 100 MG/1
100 CAPSULE ORAL 3 TIMES DAILY
Status: DISCONTINUED | OUTPATIENT
Start: 2020-02-21 | End: 2020-02-22

## 2020-02-21 RX ORDER — LIDOCAINE HYDROCHLORIDE 10 MG/ML
10 INJECTION, SOLUTION EPIDURAL; INFILTRATION; INTRACAUDAL; PERINEURAL ONCE
Status: DISCONTINUED | OUTPATIENT
Start: 2020-02-21 | End: 2020-02-25

## 2020-02-21 RX ORDER — DEXTROSE MONOHYDRATE 25 G/50ML
25-50 INJECTION, SOLUTION INTRAVENOUS
Status: DISCONTINUED | OUTPATIENT
Start: 2020-02-21 | End: 2020-02-21

## 2020-02-21 RX ORDER — FUROSEMIDE 10 MG/ML
80 INJECTION INTRAMUSCULAR; INTRAVENOUS ONCE
Status: COMPLETED | OUTPATIENT
Start: 2020-02-21 | End: 2020-02-21

## 2020-02-21 RX ORDER — NICOTINE POLACRILEX 4 MG
15-30 LOZENGE BUCCAL
Status: DISCONTINUED | OUTPATIENT
Start: 2020-02-21 | End: 2020-02-21

## 2020-02-21 RX ADMIN — AMIODARONE HYDROCHLORIDE 150 MG: 1.5 INJECTION, SOLUTION INTRAVENOUS at 16:35

## 2020-02-21 RX ADMIN — DICLOFENAC 4 G: 10 GEL TOPICAL at 20:15

## 2020-02-21 RX ADMIN — FLUOXETINE 20 MG: 20 CAPSULE ORAL at 08:02

## 2020-02-21 RX ADMIN — CEFTRIAXONE 2 G: 2 INJECTION, POWDER, FOR SOLUTION INTRAMUSCULAR; INTRAVENOUS at 09:52

## 2020-02-21 RX ADMIN — THIAMINE HCL TAB 100 MG 100 MG: 100 TAB at 08:01

## 2020-02-21 RX ADMIN — SENNOSIDES AND DOCUSATE SODIUM 2 TABLET: 8.6; 5 TABLET ORAL at 20:13

## 2020-02-21 RX ADMIN — PROPOFOL 20 MCG/KG/MIN: 10 INJECTION, EMULSION INTRAVENOUS at 03:02

## 2020-02-21 RX ADMIN — ATORVASTATIN CALCIUM 20 MG: 20 TABLET, FILM COATED ORAL at 20:14

## 2020-02-21 RX ADMIN — SENNOSIDES AND DOCUSATE SODIUM 2 TABLET: 8.6; 5 TABLET ORAL at 08:02

## 2020-02-21 RX ADMIN — PANTOPRAZOLE SODIUM 40 MG: 40 INJECTION, POWDER, FOR SOLUTION INTRAVENOUS at 07:34

## 2020-02-21 RX ADMIN — GABAPENTIN 100 MG: 100 CAPSULE ORAL at 20:14

## 2020-02-21 RX ADMIN — BIVALIRUDIN 0.02 MG/KG/HR: 250 INJECTION, POWDER, LYOPHILIZED, FOR SOLUTION INTRAVENOUS at 17:54

## 2020-02-21 RX ADMIN — AMPICILLIN SODIUM 2 G: 2 INJECTION, POWDER, FOR SOLUTION INTRAMUSCULAR; INTRAVENOUS at 03:03

## 2020-02-21 RX ADMIN — MUPIROCIN 1 G: 20 OINTMENT TOPICAL at 20:21

## 2020-02-21 RX ADMIN — ACETAMINOPHEN 650 MG: 325 TABLET, FILM COATED ORAL at 09:10

## 2020-02-21 RX ADMIN — ALLOPURINOL 200 MG: 100 TABLET ORAL at 08:02

## 2020-02-21 RX ADMIN — POTASSIUM CHLORIDE 20 MEQ: 1.5 POWDER, FOR SOLUTION ORAL at 06:12

## 2020-02-21 RX ADMIN — MAGNESIUM OXIDE 400 MG: 400 TABLET ORAL at 08:02

## 2020-02-21 RX ADMIN — EPINEPHRINE 0.01 MCG/KG/MIN: 1 INJECTION PARENTERAL at 16:23

## 2020-02-21 RX ADMIN — AMIODARONE HYDROCHLORIDE 0.5 MG/MIN: 50 INJECTION, SOLUTION INTRAVENOUS at 17:00

## 2020-02-21 RX ADMIN — DICLOFENAC 4 G: 10 GEL TOPICAL at 15:26

## 2020-02-21 RX ADMIN — DICLOFENAC 4 G: 10 GEL TOPICAL at 12:43

## 2020-02-21 RX ADMIN — AMPICILLIN SODIUM 2 G: 2 INJECTION, POWDER, FOR SOLUTION INTRAMUSCULAR; INTRAVENOUS at 14:14

## 2020-02-21 RX ADMIN — AMPICILLIN SODIUM 2 G: 2 INJECTION, POWDER, FOR SOLUTION INTRAMUSCULAR; INTRAVENOUS at 20:13

## 2020-02-21 RX ADMIN — MUPIROCIN 1 G: 20 OINTMENT TOPICAL at 08:12

## 2020-02-21 RX ADMIN — ASPIRIN 81 MG CHEWABLE TABLET 81 MG: 81 TABLET CHEWABLE at 08:02

## 2020-02-21 RX ADMIN — LIDOCAINE HYDROCHLORIDE 15 ML: 20 SOLUTION ORAL; TOPICAL at 16:19

## 2020-02-21 RX ADMIN — FUROSEMIDE 80 MG: 10 INJECTION, SOLUTION INTRAVENOUS at 09:57

## 2020-02-21 RX ADMIN — DICLOFENAC 4 G: 10 GEL TOPICAL at 08:12

## 2020-02-21 RX ADMIN — AMPICILLIN SODIUM 2 G: 2 INJECTION, POWDER, FOR SOLUTION INTRAMUSCULAR; INTRAVENOUS at 07:38

## 2020-02-21 RX ADMIN — AMIODARONE HYDROCHLORIDE 400 MG: 200 TABLET ORAL at 08:01

## 2020-02-21 RX ADMIN — DOBUTAMINE 7.5 MCG/KG/MIN: 12.5 INJECTION, SOLUTION INTRAVENOUS at 03:02

## 2020-02-21 RX ADMIN — CEFTRIAXONE 2 G: 2 INJECTION, POWDER, FOR SOLUTION INTRAMUSCULAR; INTRAVENOUS at 22:56

## 2020-02-21 RX ADMIN — POTASSIUM CHLORIDE 20 MEQ: 29.8 INJECTION, SOLUTION INTRAVENOUS at 16:38

## 2020-02-21 ASSESSMENT — ACTIVITIES OF DAILY LIVING (ADL)
ADLS_ACUITY_SCORE: 19
ADLS_ACUITY_SCORE: 21
ADLS_ACUITY_SCORE: 18
ADLS_ACUITY_SCORE: 21
ADLS_ACUITY_SCORE: 18
ADLS_ACUITY_SCORE: 21

## 2020-02-21 ASSESSMENT — MIFFLIN-ST. JEOR: SCORE: 1732.63

## 2020-02-21 NOTE — PROGRESS NOTES
CVTS Progress Note  2/21/2020      CO-MORBIDITIES:   Patient Active Problem List   Diagnosis     Acute on chronic systolic congestive heart failure (H)     Anxiety     CKD (chronic kidney disease) stage 3, GFR 30-59 ml/min (H)     Coronary artery disease involving native coronary artery of native heart without angina pectoris     GERD (gastroesophageal reflux disease)     Gout     Hyperlipidemia with target LDL less than 100     Nonischemic cardiomyopathy (H)     Non-nephrotic range proteinuria     ABHINAV on CPAP     Type 2 diabetes mellitus with stage 3 chronic kidney disease, without long-term current use of insulin (H)     Heart failure (H)     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 Redd on 2/18 for chronic systolic heart failure 2/2 [facility].    PLAN FOR TODAY:  - PST with plan to extubate today  - wean pressors as able    PLAN:  Neuro/ pain/ sedation:  - Monitor neurological status. Notify the MD for any acute changes in exam.  - Fentanyl gtt for pain.  - Propofol gtt for sedation.  - continue home fluoxetine    Pulmonary care:   #COPD  - MV  - Titrate FiO2 for SpO2 >92%  - ABHINAV on CPAP, order CPAP while sleeping once extubated    Cardiovascular:    #Moderate CAD ([facility] 11/2019 nonobstructive CAD w/ severe branch disease)  #CHF EF 15-20% s/p LVAD placement  #Vfib this admission, s/p amio load  - Monitor hemodynamic status.   - MAP >65  - Hold PTA entresto, spironolactone  - continue ASA 81 mg, atorvastatin  - continue amio 400 mg BID  - Jeramy wean    GI care:   - NPO except ice chips and medications.  - NJ placement today    Fluids/ Electrolytes/ Nutrition:   - TKO for IV fluid hydration  - No indication for parenteral nutrition.    Renal/ Fluid Balance:    #CKD III  - Urine output is adequate so far.  - Will continue to monitor intake and output.    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   - No chemical DVT prophylaxis due to high risk of bleeding.   - Protonix qd    Lines/ tubes/ drains:  - MAC, [place], Arterial line, LIJ dialysis line, lombardo    Disposition:  - CV ICU.     Patient seen, findings and plan discussed with CVTS fellow    Scott Giron MD  CA-3/PGY-4 Anesthesiology  [phone]      ====================================    PAST MEDICAL HISTORY: No past medical history on file.    PAST SURGICAL HISTORY:   Past Surgical History:   Procedure Laterality Date     CV CENTRAL VENOUS CATHETER PLACEMENT N/A 2/13/2020    Procedure: Central Venous Catheter Placement;  Surgeon: Chente Moss MD;  Location:  HEART CARDIAC CATH LAB     CV INTRA-AORTIC BALLOON PUMP INSERTION N/A 2/7/2020    Procedure: Intra-Aortic Balloon Pump Insertion;  Surgeon: Jose Baldwin MD;  Location:  HEART CARDIAC CATH LAB     CV INTRA-AORTIC BALLOON PUMP INSERTION N/A 2/13/2020    Procedure: Intra-Aortic Balloon Pump Insertion;  Surgeon: Chente Moss MD;  Location:  HEART CARDIAC CATH LAB     CV SWAN LUCIANA PROCEDURE N/A 2/13/2020    Procedure: [place] Luciana Procedure;  Surgeon: Chente Moss MD;  Location:  HEART CARDIAC CATH LAB     INSERT VENTRICULAR ASSIST DEVICE LEFT (HEARTMATE II) N/A 2/18/2020    Procedure: INSERTION, LEFT VENTRICULAR ASSIST DEVICE (HEARTMATE III);  Surgeon: Mac Jaramillo MD;  Location:  OR       FAMILY HISTORY: No family history on file.    SOCIAL HISTORY:    Social History     Tobacco Use     Smoking status: Not on file   Substance Use Topics     Alcohol use: Not on file         OBJECTIVE:   1. VITAL SIGNS:   Temp:  [99.3  F (37.4  C)-100.2  F (37.9  C)] 99.5  F (37.5  C)  Heart Rate:  [] 117  Resp:  [12-22] 22  MAP:  [67 mmHg-94 mmHg] 94 mmHg  Arterial Line BP: ()/(58-83) 110/83  FiO2 (%):  [40 %] 40 %  SpO2:  [97 %-100 %] 100 %    Ventilation Mode: (S) CPAP/PS  (Continuous positive airway pressure with Pressure Support)  FiO2 (%): 40 %  Rate Set (breaths/minute): 12 breaths/min  Tidal Volume Set (mL): 500 mL  PEEP (cm H2O): 5 cmH2O  Pressure Support (cm H2O): 7 cmH2O  Oxygen Concentration (%): 40 %  Resp: 22        2. INTAKE/ OUTPUT:   I/O last 3 completed shifts:  In: 2134.94 [I.V.:2034.94; NG/GT:100]  Out: 3120 [Urine:2030; Emesis/NG output:500; Chest Tube:590]    3. PHYSICAL EXAMINATION:   General: intubated, sedated  Neuro: sedated, follows commands in all 4 extremities  Resp: MV  CV: RRR  Abdomen: Soft, Non-distended, Non-tender  Incisions: c/d/i  Extremities: warm and well perfused, 1+ edema    4. INVESTIGATIONS:   Arterial Blood Gases     Recent Labs   Lab 02/21/20  0333 02/20/20  2044 02/20/20  1551 02/20/20  1414  02/19/20  2335   WBC 12.7*  --  12.5*  --    < > 7.9   HGB 8.5*  --  9.1*  --    < > 9.1*     --  203  --    < > 158   INR  --   --   --  1.26*  --  1.31*   *  --  146*  --    < >  --    POTASSIUM 3.8 3.9 4.1  --    < > 4.0   BUN 36*  --  33*  --    < > 32*   CR 1.80*  --  1.80*  --    < > 1.71*    < > = values in this interval not displayed.           5. RADIOLOGY:     =========================================        Patient seen and examined. Investigations reviewed. Continue to slowly wean inotropes. Continue current anticoagulation plans. I agree with the findings outlined in the resident's note. I spent a total of 20 minutes examining the patient, reviewing investigations and therapeutic counseling.

## 2020-02-21 NOTE — PROGRESS NOTES
Advanced Heart Failure Consult Progress Note  Eliseo Tanner MRN: [id number]  Age: 64 year old, : 1955  Date: 2020              Assessment and Plan:     Mr Eliseo Tanner is a 65yo M w/PMHx notable for chronic systolic heart failure, NICM, moderate CAD, HTN, ABHINAV on CPAP, DM2, CKDIII who is transferred to [facility] for evaluation and management of advanced heart failure with arrhythmia now s/p HM 3 on .    Today's plan ()  -Plan to repeat hemodynacmis in PM on  and then if stable decrease dobutamine; keep EPI on as of     Moderate CAD  Severe Mitral regurgitation  Chronic nonischemic systolic heart failure w/ reduced EF (15-20%) and exacerbation  NYHA Class III. Echo 2020: LVEF 15-20%; LVEDd 5.9 cm; 4+ MR. [facility] 2019 w/ nonobstructive CAD w/ severe branch disease. Presented with increased wt gain, SOB, LE edema concerning for HF exacerbation, initially concerning for cardiogenic shock. Admitted to [facility] for further evaluation regarding need for advanced HF therapies. Patient is now s/p HM III placement on  with early post-op course complicated by bleeding that is slowing down as of  AM.   -Trend Hbg q6h and transfuse for Hgb <7.0  -Monitor fibrinogen, INR, platelets  -Continue ASA 81, atorvastatin 20 mg  -Plan to repeat hemodynacmis in PM on  and then if stable decrease dobutamine; keep EPI on as of     Proteus and Enterococcus bacteremia  Organisms growing from 2/2 blood cultures from . Blood cultured from  NGTD and 2/15 growing 1/2 S.epi, likely contaminant per ID.  ID consulted, appreciate help.  -daily blood cultures until negative  -Zosyn (-)  -Tobramycin (-)  -Vancomycin (-  -Meropenem (-2/15)  -Ceftriaxone (-  -Ampicillin (-    #Thrombocytopenia:  Concern for HIT given timing with respect to heparin exposure. HIT positive DAVINA negative. Antibody is now negative x2, thus no further indication  "for PLAX  -Heme consulted  -Bivalirudin gtt after LVAD surgery    VFib requiring shock  Shocked x 3 prior to transfer, loaded with amio. No known prior history. Suspect related to underlying HF.   -Keep K>4, Mg>2  -Amio 400 mg PO QD  -Need ICD for secondary prevention     Chronic:  ABHINAV: Home CPAP  Gout: PTA allopurinol 200 daily  MDD: PTA fluoxetine  GERD: PTA PPI  COPD: albuterol PRN    FEN:  2gm Na   2L water restriction    PPX: Continue bival and warfarin    CODE: FULL CODE     Stanford Acuna M.D.  Cardiology fellow             Subjective/Interval Events     No acute overnight events. This AM, patient following all commands. Denying significant pain.          Objective     BP (!) 72/53   Pulse 98   Temp 100.4  F (38  C)   Resp 20   Ht 1.702 m (5' 7\")   Wt 99.4 kg (219 lb 2.2 oz)   SpO2 99%   BMI 34.32 kg/m    Temp:  [99.3  F (37.4  C)-100.4  F (38  C)] 100.4  F (38  C)  Heart Rate:  [] 97  Resp:  [12-22] 20  MAP:  [67 mmHg-95 mmHg] 82 mmHg  Arterial Line BP: ()/(58-83) 105/71  FiO2 (%):  [40 %] 40 %  SpO2:  [97 %-100 %] 99 %  Wt Readings from Last 2 Encounters:   02/21/20 99.4 kg (219 lb 2.2 oz)     I/O last 3 completed shifts:  In: 2134.94 [I.V.:2034.94; NG/GT:100]  Out: 3120 [Urine:2030; Emesis/NG output:500; Chest Tube:590]      Gen: No acute distress, but intubated and sedated, but following all commands  HEENT: NC/AT  PULM/THORAX: mechanical breath sounds, but no wheezes; chest tubes in place with no bleeding  CV: normal rate, regular rhythm, normal S1 and S2, LVAD hum  ABD: Soft, NTND, bowel sounds present, no masses  EXT: WWP. No LE edema, clubbing or cyanosis.  NEURO: alert and following commands                Data:     Recent Results (from the past 24 hour(s))   Glucose by meter    Collection Time: 02/20/20  2:13 PM   Result Value Ref Range    Glucose 123 (H) 70 - 99 mg/dL   INR    Collection Time: 02/20/20  2:14 PM   Result Value Ref Range    INR 1.26 (H) 0.86 - 1.14   Glucose by " meter    Collection Time: 02/20/20  3:50 PM   Result Value Ref Range    Glucose 114 (H) 70 - 99 mg/dL   Basic metabolic panel    Collection Time: 02/20/20  3:51 PM   Result Value Ref Range    Sodium 146 (H) 133 - 144 mmol/L    Potassium 4.1 3.4 - 5.3 mmol/L    Chloride 113 (H) 94 - 109 mmol/L    Carbon Dioxide 29 20 - 32 mmol/L    Anion Gap 4 3 - 14 mmol/L    Glucose 128 (H) 70 - 99 mg/dL    Urea Nitrogen 33 (H) 7 - 30 mg/dL    Creatinine 1.80 (H) 0.66 - 1.25 mg/dL    GFR Estimate 38 (L) >60 mL/min/[1.73_m2]    GFR Estimate If Black 44 (L) >60 mL/min/[1.73_m2]    Calcium 7.6 (L) 8.5 - 10.1 mg/dL   CBC with platelets    Collection Time: 02/20/20  3:51 PM   Result Value Ref Range    WBC 12.5 (H) 4.0 - 11.0 10e9/L    RBC Count 3.16 (L) 4.4 - 5.9 10e12/L    Hemoglobin 9.1 (L) 13.3 - 17.7 g/dL    Hematocrit 27.5 (L) 40.0 - 53.0 %    MCV 87 78 - 100 fl    MCH 28.8 26.5 - 33.0 pg    MCHC 33.1 31.5 - 36.5 g/dL    RDW 15.9 (H) 10.0 - 15.0 %    Platelet Count 203 150 - 450 10e9/L   Blood gas arterial with oxyhemoglobin (PCU Collect TBD)    Collection Time: 02/20/20  3:51 PM   Result Value Ref Range    pH Arterial 7.45 7.35 - 7.45 pH    pCO2 Arterial 44 35 - 45 mm Hg    pO2 Arterial 122 (H) 80 - 105 mm Hg    Bicarbonate Arterial 30 (H) 21 - 28 mmol/L    FIO2 40     Oxyhemoglobin Arterial 98 92 - 100 %    Base Excess Art 5.4 mmol/L   Type and Screen (AM Draw)    Collection Time: 02/20/20  3:51 PM   Result Value Ref Range    ABO A     RH(D) Pos     Antibody Screen Neg     Test Valid Only At          [facility]    Specimen Expires 02/23/2020    Glucose by meter    Collection Time: 02/20/20  6:13 PM   Result Value Ref Range    Glucose 122 (H) 70 - 99 mg/dL   Partial thromboplastin time    Collection Time: 02/20/20  6:57 PM   Result Value Ref Range    PTT 55 (H) 22 - 37 sec   Glucose by meter    Collection Time: 02/20/20  8:12 PM   Result Value Ref Range    Glucose 124 (H) 70 - 99 mg/dL    Magnesium    Collection Time: 02/20/20  8:44 PM   Result Value Ref Range    Magnesium 2.2 1.6 - 2.3 mg/dL   Phosphorus    Collection Time: 02/20/20  8:44 PM   Result Value Ref Range    Phosphorus 4.4 2.5 - 4.5 mg/dL   Potassium    Collection Time: 02/20/20  8:44 PM   Result Value Ref Range    Potassium 3.9 3.4 - 5.3 mmol/L   Glucose by meter    Collection Time: 02/20/20  9:57 PM   Result Value Ref Range    Glucose 127 (H) 70 - 99 mg/dL   Glucose by meter    Collection Time: 02/20/20 11:32 PM   Result Value Ref Range    Glucose 120 (H) 70 - 99 mg/dL   Glucose by meter    Collection Time: 02/21/20  1:34 AM   Result Value Ref Range    Glucose 128 (H) 70 - 99 mg/dL   Basic metabolic panel    Collection Time: 02/21/20  3:33 AM   Result Value Ref Range    Sodium 146 (H) 133 - 144 mmol/L    Potassium 3.8 3.4 - 5.3 mmol/L    Chloride 112 (H) 94 - 109 mmol/L    Carbon Dioxide 30 20 - 32 mmol/L    Anion Gap 4 3 - 14 mmol/L    Glucose 129 (H) 70 - 99 mg/dL    Urea Nitrogen 36 (H) 7 - 30 mg/dL    Creatinine 1.80 (H) 0.66 - 1.25 mg/dL    GFR Estimate 38 (L) >60 mL/min/[1.73_m2]    GFR Estimate If Black 44 (L) >60 mL/min/[1.73_m2]    Calcium 7.6 (L) 8.5 - 10.1 mg/dL   Partial thromboplastin time    Collection Time: 02/21/20  3:33 AM   Result Value Ref Range    PTT 58 (H) 22 - 37 sec   CBC with platelets    Collection Time: 02/21/20  3:33 AM   Result Value Ref Range    WBC 12.7 (H) 4.0 - 11.0 10e9/L    RBC Count 2.95 (L) 4.4 - 5.9 10e12/L    Hemoglobin 8.5 (L) 13.3 - 17.7 g/dL    Hematocrit 26.5 (L) 40.0 - 53.0 %    MCV 90 78 - 100 fl    MCH 28.8 26.5 - 33.0 pg    MCHC 32.1 31.5 - 36.5 g/dL    RDW 16.4 (H) 10.0 - 15.0 %    Platelet Count 219 150 - 450 10e9/L   Magnesium    Collection Time: 02/21/20  3:33 AM   Result Value Ref Range    Magnesium 2.4 (H) 1.6 - 2.3 mg/dL   Blood gas venous and oxyhgb    Collection Time: 02/21/20  3:33 AM   Result Value Ref Range    Ph Venous 7.42 7.32 - 7.43 pH    PCO2 Venous 48 40 - 50 mm Hg     PO2 Venous 35 25 - 47 mm Hg    Bicarbonate Venous 32 (H) 21 - 28 mmol/L    FIO2 40     Oxyhemoglobin Venous 64 %    Base Excess Venous 6.2 mmol/L   Blood gas arterial and oxyhgb    Collection Time: 02/21/20  3:33 AM   Result Value Ref Range    pH Arterial 7.45 7.35 - 7.45 pH    pCO2 Arterial 43 35 - 45 mm Hg    pO2 Arterial 140 (H) 80 - 105 mm Hg    Bicarbonate Arterial 30 (H) 21 - 28 mmol/L    FIO2 40     Oxyhemoglobin Arterial 98 92 - 100 %    Base Excess Art 5.7 mmol/L   Hepatic panel    Collection Time: 02/21/20  3:33 AM   Result Value Ref Range    Bilirubin Direct 0.6 (H) 0.0 - 0.2 mg/dL    Bilirubin Total 0.9 0.2 - 1.3 mg/dL    Albumin 2.3 (L) 3.4 - 5.0 g/dL    Protein Total 5.1 (L) 6.8 - 8.8 g/dL    Alkaline Phosphatase 64 40 - 150 U/L    ALT 22 0 - 70 U/L    AST 79 (H) 0 - 45 U/L   Glucose by meter    Collection Time: 02/21/20  3:42 AM   Result Value Ref Range    Glucose 120 (H) 70 - 99 mg/dL   Glucose by meter    Collection Time: 02/21/20  6:15 AM   Result Value Ref Range    Glucose 113 (H) 70 - 99 mg/dL   Blood gas venous with oxyhemoglobin (PCU Collect TBD)    Collection Time: 02/21/20  7:54 AM   Result Value Ref Range    Ph Venous 7.43 7.32 - 7.43 pH    PCO2 Venous 45 40 - 50 mm Hg    PO2 Venous 35 25 - 47 mm Hg    Bicarbonate Venous 30 (H) 21 - 28 mmol/L    FIO2 40     Oxyhemoglobin Venous 63 %    Base Excess Venous 4.8 mmol/L   Glucose by meter    Collection Time: 02/21/20  7:56 AM   Result Value Ref Range    Glucose 109 (H) 70 - 99 mg/dL   Glucose by meter    Collection Time: 02/21/20 10:06 AM   Result Value Ref Range    Glucose 122 (H) 70 - 99 mg/dL   Blood gas arterial and oxyhgb    Collection Time: 02/21/20 10:07 AM   Result Value Ref Range    pH Arterial 7.47 (H) 7.35 - 7.45 pH    pCO2 Arterial 40 35 - 45 mm Hg    pO2 Arterial 141 (H) 80 - 105 mm Hg    Bicarbonate Arterial 29 (H) 21 - 28 mmol/L    FIO2 40     Oxyhemoglobin Arterial 98 92 - 100 %    Base Excess Art 4.7 mmol/L   Blood gas arterial  and oxyhgb    Collection Time: 20 11:13 AM   Result Value Ref Range    pH Arterial 7.45 7.35 - 7.45 pH    pCO2 Arterial 43 35 - 45 mm Hg    pO2 Arterial 114 (H) 80 - 105 mm Hg    Bicarbonate Arterial 29 (H) 21 - 28 mmol/L    FIO2 2L     Oxyhemoglobin Arterial 97 92 - 100 %    Base Excess Art 4.9 mmol/L   Glucose by meter    Collection Time: 20 11:17 AM   Result Value Ref Range    Glucose 136 (H) 70 - 99 mg/dL       Recent Results (from the past 24 hour(s))   Echocardiogram Complete    Narrative    [id number]  [id number]  [id number]  [id number]           [facility]  Echocardiography Laboratory  [address] [place] [address]     Name: WOLFGANG LEACH  MRN: [id number]  : 1955  Study Date: 2020 10:06 AM  Age: 64 yrs  Gender: Male  Patient Location: [facility]  Reason For Study: Heart Failure  Ordering Physician: NAHUN GUTIERREZ  Referring Physician: SELF, REFERRED  Performed By: Yvonne Adan RDCS     BSA: 2.2 m2  Height: 67 in  Weight: 244 lb  HR: 103  BP: 72/52 mmHg  _____________________________________________________________________________  __        Procedure  Complete Portable Echo Adult. Contrast Optison. Optison (NDC #7377-6415-44)  given intravenously. Patient was given 6 ml mixture of 3 ml Optison and 6 ml  saline. 3 ml wasted.  _____________________________________________________________________________  __        Interpretation Summary  Left ventricular size is normal.  LVEF 20% based on biplane 2D tracing.  Mild to moderate right ventricular dilation is present.  Global right ventricular function is moderately reduced.  Pulmonary artery systolic pressure is normal.  Dilation of the inferior vena cava is present with abnormal respiratory  variation in diameter.  _____________________________________________________________________________  __        Left Ventricle  Left ventricular size is normal. LVEF 20% based on biplane 2D  tracing. Left  ventricular wall thickness is normal. Diastolic function not assessed due to  frequent ectopy. Abnormal septal motion consistent with left bundle branch  block is present.     Right Ventricle  Mild to moderate right ventricular dilation is present. Global right  ventricular function is moderately reduced.     Atria  Mild biatrial enlargement is present.     Mitral Valve  Moderate mitral insufficiency is present.        Aortic Valve  Aortic valve is normal in structure and function.     Tricuspid Valve  Moderate tricuspid insufficiency is present. The right ventricular systolic  pressure is approximated at 24.4 mmHg plus the right atrial pressure.  Pulmonary artery systolic pressure is normal.     Pulmonic Valve  The pulmonic valve cannot be assessed. Mild pulmonic insufficiency is present.     Vessels  The aorta root is normal. The pulmonary artery cannot be assessed. Dilation of  the inferior vena cava is present with abnormal respiratory variation in  diameter.     Compared to Previous Study  Previous study not available for comparison.     _____________________________________________________________________________  __  MMode/2D Measurements & Calculations  IVSd: 0.61 cm  LVIDd: 4.7 cm  LVIDs: 3.7 cm  LVPWd: 0.75 cm  FS: 21.7 %  LV mass(C)d: 99.1 grams  LV mass(C)dI: 45.0 grams/m2     Ao root diam: 2.9 cm  asc Aorta Diam: 2.5 cm  LVOT diam: 1.9 cm  LVOT area: 2.8 cm2     EF(MOD-bp): 21.5 %  LA Volume (BP): 84.8 ml  LA Volume Index (BP): 38.5 ml/m2  RWT: 0.32        Doppler Measurements & Calculations  PA acc time: 0.09 sec     PI end-d saumya: 154.0 cm/sec  TR max saumya: 247.0 cm/sec  TR max P.4 mmHg     _____________________________________________________________________________  __           Report approved by: Graciela Tomlinson 2020 12:05 PM      XR Chest Port 1 View    Narrative    XR CHEST PORT 1 VW  2020 4:21 PM      HISTORY: SG Placement    COMPARISON: None available    FINDINGS:  Single AP chest radiograph. [place] catheter tip is in the  proximal right main pulmonary artery. Right central line tip and right  upper extremity PICC line tip at the lower SVC. Trachea is midline,  cardiomegaly. Bilateral perihilar streaky opacities. Mild increased  interstitial opacities in the right lung with ill-defined pulmonary  vascularity. No pneumothorax or pleural effusion. No acute osseous or  abdominal abnormality. Elevated left hemidiaphragm.      Impression    IMPRESSION:  1. [place] catheter tip at the proximal right main pulmonary artery.  2. Cardiomegaly with mild pulmonary edema, especially on the right.    I have personally reviewed the examination and initial interpretation  and I agree with the findings.    DONG SOLORIO MD   Cardiac Catheterization    Narrative      Successful insertion of a IABP in the RFA                Medications     Current Facility-Administered Medications   Medication     acetaminophen (TYLENOL) tablet 650 mg     albuterol (PROAIR HFA/PROVENTIL HFA/VENTOLIN HFA) 108 (90 Base) MCG/ACT inhaler 2 puff     allopurinol (ZYLOPRIM) tablet 200 mg     amiodarone (PACERONE) tablet 400 mg     ampicillin (OMNIPEN) 2 g vial to attach to  ml bag     aspirin (ASA) chewable tablet 81 mg     atorvastatin (LIPITOR) tablet 20 mg     bisacodyl (DULCOLAX) EC tablet 5 mg    Or     bisacodyl (DULCOLAX) EC tablet 10 mg    Or     bisacodyl (DULCOLAX) EC tablet 15 mg     bivalirudin (ANGIOMAX) 250 mg in sodium chloride 0.9 % 250 mL ANTICOAGULANT infusion     calcium carbonate (TUMS) chewable tablet 500 mg     cefTRIAXone (ROCEPHIN) 2 g vial to attach to  ml bag for ADULTS or NS 50 ml bag for PEDS     co-enzyme Q-10 capsule 200 mg     dextrose 10% infusion     glucose gel 15-30 g    Or     dextrose 50 % injection 25-50 mL    Or     glucagon injection 1 mg     diclofenac (VOLTAREN) 1 % topical gel 4 g     DOBUTamine (DOBUTREX) 1,000 mg in D5W 250 mL infusion (adult max  conc)     EPINEPHrine (ADRENALIN) 5 mg in sodium chloride 0.9 % 250 mL infusion     fentaNYL (PF) (SUBLIMAZE) injection 25 mcg     fentaNYL (SUBLIMAZE) infusion     FLUoxetine (PROzac) capsule 20 mg     hydrALAZINE (APRESOLINE) injection 10 mg     insulin aspart (NovoLOG) injection (RAPID ACTING)     lidocaine (LMX4) cream     lidocaine (XYLOCAINE) 2 % solution 5 mL     lidocaine 1 % 0.1-1 mL     magnesium oxide (MAG-OX) tablet 400 mg     magnesium sulfate 2 g in water intermittent infusion     magnesium sulfate 4 g in 100 mL sterile water (premade)     medication instruction     melatonin tablet 3 mg     meperidine (DEMEROL) injection 12.5-25 mg     mupirocin (BACTROBAN) 2 % ointment 1 g     naloxone (NARCAN) injection 0.1-0.4 mg     pantoprazole (PROTONIX) 40 mg IV push injection     polyethylene glycol (MIRALAX) Packet 17 g     potassium chloride (KLOR-CON) Packet 20-40 mEq     potassium chloride 10 mEq in 100 mL intermittent infusion with 10 mg lidocaine     potassium chloride 10 mEq in 100 mL sterile water intermittent infusion (premix)     potassium chloride 20 mEq in 50 mL intermittent infusion     potassium chloride ER (KLOR-CON M) CR tablet 20-40 mEq     Reason beta blocker order not selected     senna-docusate (SENOKOT-S/PERICOLACE) 8.6-50 MG per tablet 1 tablet    Or     senna-docusate (SENOKOT-S/PERICOLACE) 8.6-50 MG per tablet 2 tablet     sodium chloride (PF) 0.9% PF flush 3 mL     sodium chloride (PF) 0.9% PF flush 3 mL     vitamin B1 (THIAMINE) tablet 100 mg

## 2020-02-21 NOTE — PROGRESS NOTES
Patient suctioned and electively extubated per physician order. Placed on LFNC @ 2 LPM, breath sounds were clear, diminished, labs pending. Patient tolerated procedure well without any immediate complications.    Maria Teresa Hadley, MERCEDES, RT  2/21/2020 12:32 PM

## 2020-02-21 NOTE — PROGRESS NOTES
Pt pressure supported for 2 hours, able to lift head off pillow, pulling good tidal volumes and resp rates. Pt extubated at 1042 to 3L nasal cannula, tolerated well.

## 2020-02-21 NOTE — PROGRESS NOTES
CV ICU Progress Note  2/21/2020      CO-MORBIDITIES:   Patient Active Problem List   Diagnosis     Acute on chronic systolic congestive heart failure (H)     Anxiety     CKD (chronic kidney disease) stage 3, GFR 30-59 ml/min (H)     Coronary artery disease involving native coronary artery of native heart without angina pectoris     GERD (gastroesophageal reflux disease)     Gout     Hyperlipidemia with target LDL less than 100     Nonischemic cardiomyopathy (H)     Non-nephrotic range proteinuria     ABHINAV on CPAP     Type 2 diabetes mellitus with stage 3 chronic kidney disease, without long-term current use of insulin (H)     Heart failure (H)     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 Redd on 2/18 for chronic systolic heart failure 2/2 [facility].    PLAN FOR TODAY:  - PST with plan to extubate today  - wean pressors as able    PLAN:  Neuro/ pain/ sedation:  - Monitor neurological status. Notify the MD for any acute changes in exam.  - Fentanyl gtt for pain.  - Propofol gtt for sedation.  - continue home fluoxetine    Pulmonary care:   #COPD  - MV  - Titrate FiO2 for SpO2 >92%  - ABHINAV on CPAP, order CPAP while sleeping once extubated    Cardiovascular:    #Moderate CAD ([facility] 11/2019 nonobstructive CAD w/ severe branch disease)  #CHF EF 15-20% s/p LVAD placement  #Vfib this admission, s/p amio load  - Monitor hemodynamic status.   - MAP >65  - Hold PTA entresto, spironolactone  - continue ASA 81 mg, atorvastatin  - continue amio 400 mg BID  - Jeramy wean    GI care:   - NPO except ice chips and medications.  - NJ placement today    Fluids/ Electrolytes/ Nutrition:   - TKO for IV fluid hydration  - No indication for parenteral nutrition.    Renal/ Fluid Balance:    #CKD III  - Urine output is adequate so far.  - Will continue to monitor intake and output.    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   - No chemical DVT prophylaxis due to high risk of bleeding.   - Protonix qd    Lines/ tubes/ drains:  - MAC, [place], Arterial line, LIJ dialysis line, lombardo    Disposition:  - CV ICU.     Patient seen, findings and plan discussed with CV ICU staff, Dr. Garcia.    Scott Giron MD  CA-3/PGY-4 Anesthesiology  [phone]      ====================================    PAST MEDICAL HISTORY: No past medical history on file.    PAST SURGICAL HISTORY:   Past Surgical History:   Procedure Laterality Date     CV CENTRAL VENOUS CATHETER PLACEMENT N/A 2/13/2020    Procedure: Central Venous Catheter Placement;  Surgeon: Chente Moss MD;  Location:  HEART CARDIAC CATH LAB     CV INTRA-AORTIC BALLOON PUMP INSERTION N/A 2/7/2020    Procedure: Intra-Aortic Balloon Pump Insertion;  Surgeon: Jose Baldwin MD;  Location:  HEART CARDIAC CATH LAB     CV INTRA-AORTIC BALLOON PUMP INSERTION N/A 2/13/2020    Procedure: Intra-Aortic Balloon Pump Insertion;  Surgeon: Chente Moss MD;  Location:  HEART CARDIAC CATH LAB     CV SWAN LUCIANA PROCEDURE N/A 2/13/2020    Procedure: [place] Luciana Procedure;  Surgeon: Chente Moss MD;  Location:  HEART CARDIAC CATH LAB     INSERT VENTRICULAR ASSIST DEVICE LEFT (HEARTMATE II) N/A 2/18/2020    Procedure: INSERTION, LEFT VENTRICULAR ASSIST DEVICE (HEARTMATE III);  Surgeon: Mac Jaramillo MD;  Location:  OR       FAMILY HISTORY: No family history on  file.    SOCIAL HISTORY:   Social History     Tobacco Use     Smoking status: Not on file   Substance Use Topics     Alcohol use: Not on file         OBJECTIVE:   1. VITAL SIGNS:   Temp:  [99.3  F (37.4  C)-100.2  F (37.9  C)] 99.5  F (37.5  C)  Heart Rate:  [] 117  Resp:  [12-22] 22  MAP:  [67 mmHg-94 mmHg] 94 mmHg  Arterial Line BP: ()/(58-83) 110/83  FiO2 (%):  [40 %] 40 %  SpO2:  [97 %-100 %] 100 %    Ventilation Mode: (S) CPAP/PS  (Continuous positive airway pressure with Pressure Support)  FiO2 (%): 40 %  Rate Set (breaths/minute): 12 breaths/min  Tidal Volume Set (mL): 500 mL  PEEP (cm H2O): 5 cmH2O  Pressure Support (cm H2O): 7 cmH2O  Oxygen Concentration (%): 40 %  Resp: 22        2. INTAKE/ OUTPUT:   I/O last 3 completed shifts:  In: 2134.94 [I.V.:2034.94; NG/GT:100]  Out: 3120 [Urine:2030; Emesis/NG output:500; Chest Tube:590]    3. PHYSICAL EXAMINATION:   General: intubated, sedated  Neuro: sedated, follows commands in all 4 extremities  Resp: MV  CV: RRR  Abdomen: Soft, Non-distended, Non-tender  Incisions: c/d/i  Extremities: warm and well perfused, 1+ edema    4. INVESTIGATIONS:   Arterial Blood Gases     Recent Labs   Lab 02/21/20  0333 02/20/20  2044 02/20/20  1551 02/20/20  1414  02/19/20  2335   WBC 12.7*  --  12.5*  --    < > 7.9   HGB 8.5*  --  9.1*  --    < > 9.1*     --  203  --    < > 158   INR  --   --   --  1.26*  --  1.31*   *  --  146*  --    < >  --    POTASSIUM 3.8 3.9 4.1  --    < > 4.0   BUN 36*  --  33*  --    < > 32*   CR 1.80*  --  1.80*  --    < > 1.71*    < > = values in this interval not displayed.           5. RADIOLOGY:     =========================================

## 2020-02-21 NOTE — PLAN OF CARE
D: Post op heartmate 3 placement. Hemodynamically stable on epinephrine and dobutamine drips. NO weaned from 40 to 20 ppm overnight. Sedated with propofol/fentanyl.  I/A: NO wean continued per weaning algorithm. Plan to be off at 2000. No change in hemodynamics since wean started. Sedation decreased, follows commands/moved all 4 extremities. Sheath from IABP (left femoral) removed by STEW Garcia. Site WDL, no issues post removal.  R: Will continue to monitor/assess and update MD as needed.

## 2020-02-21 NOTE — PLAN OF CARE
Major Shift Events:  Pt extubated this morning, tolerated well. Currently on 2L nasal cannula. Dobutamine decreased to 5 this afternoon. Epinephrine continued. Pt went into a fib this afternoon, amio bolus given and drip started, pt currently a fib 110-120's. Pt up to chair with therapy, tolerated well. Speech came to see patient, see note for details. NJ tube placed by radiology. Bivalrudin continued.  Plan: Continue to wean drips as tolerated. Continue plan of care.  For vital signs and complete assessments, please see documentation flowsheets.

## 2020-02-21 NOTE — PLAN OF CARE
Discharge Planner OT   Patient plan for discharge: rehab   Current status: OT/CR evaluation completed. Pt lethargic/confused, pt with delayed processing of cues. Pt max A for rolling and dependent lift for bed > chair. Pt tolerated supine UE/LE exercises, pt with significant weakness.   Barriers to return to prior living situation: medical status, post surgical precautions, weakness, cognition   Recommendations for discharge: TCU   Rationale for recommendations: pt far below his baseline in ADL I, pt will require continued OT intervention to maximize ADL I.        Entered by: Miranda Kaufman 02/21/2020 3:02 PM

## 2020-02-21 NOTE — PLAN OF CARE
Sedated. Follows commands, moves all extremities. Weaned of nitric oxide at 2000. PAP 40/18. CVP 9. Weaned epi to 0.01 mcg/kg/min. Continues on dobutamine at 7.5 mcg/kg/min. HM3 site WNL, suture in place. Continue with POC. Extubate today if able.

## 2020-02-21 NOTE — PLAN OF CARE
Discharge Planner SLP   Patient plan for discharge: Unknown  Current status: Clinical swallow eval completed per MD order. Pt presents with mild oropharyngeal dysphagia in setting of recent extubation and lethargy. Oral mech grossly functional, strong cough and voice WNL per daughter. Assessed with thin liquids, puree, and higher texture solids. Oral phase WFL. Pharyngeal phase characterized by one strong cough on initial sip of thin liquids, no additional s/sx of aspiration during session. Recommend dysphagia 3 (chopped) diet/thin liquids with supervision. Ensure pt is fully alert and upright for all PO, taking small bites/sips at slow rate. SLP to follow.  Barriers to return to prior living situation: Medical condition, weakness, modified diet  Recommendations for discharge: Rehab  Rationale for recommendations: Pt will benefit from ongoing ST targeting swallow function       Entered by: Marlene Gardner 02/21/2020 5:23 PM

## 2020-02-21 NOTE — PROGRESS NOTES
02/21/20 1500   Quick Adds   Type of Visit Initial Occupational Therapy Evaluation   Living Environment   Lives With grandchild(arnold);spouse   Living Arrangements house   Home Accessibility stairs to enter home   Number of Stairs, Main Entrance 3   Transportation Anticipated car, drives self;family or friend will provide   Living Environment Comment Pt lives with SO and 14yr grand daughter. Pt's grand daughter lives upstairs. Pt with 3 stairs to enter and bed/bathroom on main floor. Pt has stairs to basement but deep freezer and nothing he urgently needs to access is down there.    Self-Care   Usual Activity Tolerance moderate   Current Activity Tolerance poor   Regular Exercise No   Equipment Currently Used at Home none   Activity/Exercise/Self-Care Comment Per daughter, pt is I with ADLs/IADLs at baseline. Pt diagnosed with CHF in October of 2019. Pt with steady decline in activity tolerance for ADLs however able to complete independently. 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    Functional Level   Ambulation 0-->independent   Transferring 0-->independent   Toileting 0-->independent   Bathing 0-->independent   Dressing 0-->independent   Eating 0-->independent   Communication 0-->understands/communicates without difficulty   Swallowing 0-->swallows foods/liquids without difficulty   Cognition 0 - no cognition issues reported   Fall history within last six months no   General Information   Onset of Illness/Injury or Date of Surgery - Date 02/18/20   Referring Physician Iliana Rogers   Patient/Family Goals Statement none stated    Additional Occupational Profile Info/Pertinent History of Current Problem Eliseo Tanner is a 64 year old male with PMH of ABHINAV, DM II, CKD stage 3, HTN, CAD s/p LVAD placement with Dr. Redd on 2/18 for chronic systolic heart failure 2/2 [facility].   Precautions/Limitations sternal precautions   Cognitive Status Examination    Orientation place;person   Level of Consciousness confused;lethargic/somnolent   Follows Commands (Cognition) 75-90% accuracy   Memory impaired   Cognitive Comment Further monitoring required.    Visual Perception   Visual Perception No deficits were identified   Sensory Examination   Sensory Quick Adds No deficits were identified   Pain Assessment   Patient Currently in Pain Yes, see Vital Sign flowsheet   Integumentary/Edema   Integumentary/Edema Comments Pt with mild edema in BLEs.    Range of Motion (ROM)   ROM Comment PROM WFL BUEs    Strength   Strength Comments BUEs 2+/5 MMT    Mobility   Bed Mobility Comments max A for rolling    Transfer Skill: Bed to Chair/Chair to Bed   Level of [place]: Bed to Chair dependent (less than 25% patients effort)   Lower Body Dressing   Level of [place]: Dress Lower Body dependent (less than 25% patients effort)   Instrumental Activities of Daily Living (IADL)   Previous Responsibilities meal prep;housekeeping;laundry;yardwork;medication management;shopping;driving;work   Activities of Daily Living Analysis   Impairments Contributing to Impaired Activities of Daily Living balance impaired;cognition impaired;pain;post surgical precautions;strength decreased;ROM decreased;postural control impaired   General Therapy Interventions   Planned Therapy Interventions ADL retraining;IADL retraining;balance training;bed mobility training;cognition;ROM;strengthening;transfer training;home program guidelines;progressive activity/exercise   Clinical Impression   Criteria for Skilled Therapeutic Interventions Met yes, treatment indicated   OT Diagnosis decreased ADL I    Influenced by the following impairments pain, post op precautions, deconditioning, cognition   Assessment of Occupational Performance 5 or more Performance Deficits   Identified Performance Deficits bed mobility, dressing, bathing, toileting, home management    Clinical Decision Making (Complexity) Low complexity  "  Therapy Frequency 5x/week   Predicted Duration of Therapy Intervention (days/wks) 2 weeks    Anticipated Discharge Disposition Transitional Care Facility   Risks and Benefits of Treatment have been explained. Yes   Patient, Family & other staff in agreement with plan of care Yes   Clinical Impression Comments Pt presents with the above stated deficits leading to decreased ADL I. Pt to benefit from continued OT intervention to address the above stated deficits.    [facility] AM-PAC TM \"6 Clicks\"   2016, Trustees of [facility], under license to [employer].  All rights reserved.   6 Clicks Short Forms Daily Activity Inpatient Short Form   [facility] AM-PAC  \"6 Clicks\" Daily Activity Inpatient Short Form   1. Putting on and taking off regular lower body clothing? 2 - A Lot   2. Bathing (including washing, rinsing, drying)? 2 - A Lot   3. Toileting, which includes using toilet, bedpan or urinal? 1 - Total   4. Putting on and taking off regular upper body clothing? 2 - A Lot   5. Taking care of personal grooming such as brushing teeth? 2 - A Lot   6. Eating meals? 1 - Total   Daily Activity Raw Score (Score out of 24.Lower scores equate to lower levels of function) 10   Total Evaluation Time   Total Evaluation Time (Minutes) 5     "

## 2020-02-22 ENCOUNTER — APPOINTMENT (OUTPATIENT)
Dept: PHYSICAL THERAPY | Facility: CLINIC | Age: 65
DRG: 001 | End: 2020-02-22
Attending: INTERNAL MEDICINE
Payer: MEDICARE

## 2020-02-22 ENCOUNTER — APPOINTMENT (OUTPATIENT)
Dept: SPEECH THERAPY | Facility: CLINIC | Age: 65
DRG: 001 | End: 2020-02-22
Attending: INTERNAL MEDICINE
Payer: MEDICARE

## 2020-02-22 ENCOUNTER — APPOINTMENT (OUTPATIENT)
Dept: GENERAL RADIOLOGY | Facility: CLINIC | Age: 65
DRG: 001 | End: 2020-02-22
Attending: STUDENT IN AN ORGANIZED HEALTH CARE EDUCATION/TRAINING PROGRAM
Payer: MEDICARE

## 2020-02-22 LAB
ALBUMIN SERPL-MCNC: 2.4 G/DL (ref 3.4–5)
ALBUMIN UR-MCNC: 100 MG/DL
ALP SERPL-CCNC: 76 U/L (ref 40–150)
ALT SERPL W P-5'-P-CCNC: 22 U/L (ref 0–70)
ANION GAP SERPL CALCULATED.3IONS-SCNC: 2 MMOL/L (ref 3–14)
ANION GAP SERPL CALCULATED.3IONS-SCNC: 3 MMOL/L (ref 3–14)
APPEARANCE UR: ABNORMAL
APTT PPP: 59 SEC (ref 22–37)
AST SERPL W P-5'-P-CCNC: 48 U/L (ref 0–45)
BACTERIA SPEC CULT: ABNORMAL
BACTERIA SPEC CULT: NO GROWTH
BACTERIA SPEC CULT: NO GROWTH
BASE EXCESS BLDA CALC-SCNC: 5.7 MMOL/L
BASE EXCESS BLDV CALC-SCNC: 3.9 MMOL/L
BASE EXCESS BLDV CALC-SCNC: 4.3 MMOL/L
BASE EXCESS BLDV CALC-SCNC: 6.1 MMOL/L
BASE EXCESS BLDV CALC-SCNC: 6.3 MMOL/L
BILIRUB DIRECT SERPL-MCNC: 0.3 MG/DL (ref 0–0.2)
BILIRUB SERPL-MCNC: 0.5 MG/DL (ref 0.2–1.3)
BILIRUB UR QL STRIP: NEGATIVE
BUN SERPL-MCNC: 35 MG/DL (ref 7–30)
BUN SERPL-MCNC: 35 MG/DL (ref 7–30)
CALCIUM SERPL-MCNC: 7.6 MG/DL (ref 8.5–10.1)
CALCIUM SERPL-MCNC: 7.7 MG/DL (ref 8.5–10.1)
CHLORIDE SERPL-SCNC: 111 MMOL/L (ref 94–109)
CHLORIDE SERPL-SCNC: 112 MMOL/L (ref 94–109)
CO2 SERPL-SCNC: 31 MMOL/L (ref 20–32)
CO2 SERPL-SCNC: 32 MMOL/L (ref 20–32)
COLOR UR AUTO: YELLOW
CREAT SERPL-MCNC: 1.52 MG/DL (ref 0.66–1.25)
CREAT SERPL-MCNC: 1.65 MG/DL (ref 0.66–1.25)
ERYTHROCYTE [DISTWIDTH] IN BLOOD BY AUTOMATED COUNT: 16.7 % (ref 10–15)
ERYTHROCYTE [DISTWIDTH] IN BLOOD BY AUTOMATED COUNT: 17.2 % (ref 10–15)
FACT X ACT/NOR PPP CHRO: 116 % (ref 70–130)
GFR SERPL CREATININE-BSD FRML MDRD: 42 ML/MIN/{1.73_M2}
GFR SERPL CREATININE-BSD FRML MDRD: 47 ML/MIN/{1.73_M2}
GLUCOSE BLDC GLUCOMTR-MCNC: 129 MG/DL (ref 70–99)
GLUCOSE BLDC GLUCOMTR-MCNC: 135 MG/DL (ref 70–99)
GLUCOSE BLDC GLUCOMTR-MCNC: 155 MG/DL (ref 70–99)
GLUCOSE BLDC GLUCOMTR-MCNC: 181 MG/DL (ref 70–99)
GLUCOSE BLDC GLUCOMTR-MCNC: 215 MG/DL (ref 70–99)
GLUCOSE BLDC GLUCOMTR-MCNC: 224 MG/DL (ref 70–99)
GLUCOSE SERPL-MCNC: 136 MG/DL (ref 70–99)
GLUCOSE SERPL-MCNC: 239 MG/DL (ref 70–99)
GLUCOSE UR STRIP-MCNC: NEGATIVE MG/DL
HCO3 BLD-SCNC: 31 MMOL/L (ref 21–28)
HCO3 BLDV-SCNC: 29 MMOL/L (ref 21–28)
HCO3 BLDV-SCNC: 29 MMOL/L (ref 21–28)
HCO3 BLDV-SCNC: 32 MMOL/L (ref 21–28)
HCO3 BLDV-SCNC: 32 MMOL/L (ref 21–28)
HCT VFR BLD AUTO: 28.7 % (ref 40–53)
HCT VFR BLD AUTO: 30.4 % (ref 40–53)
HGB BLD-MCNC: 9 G/DL (ref 13.3–17.7)
HGB BLD-MCNC: 9.2 G/DL (ref 13.3–17.7)
HGB UR QL STRIP: ABNORMAL
INR PPP: 1.57 (ref 0.86–1.14)
KETONES UR STRIP-MCNC: NEGATIVE MG/DL
LEUKOCYTE ESTERASE UR QL STRIP: ABNORMAL
MAGNESIUM SERPL-MCNC: 2 MG/DL (ref 1.6–2.3)
MAGNESIUM SERPL-MCNC: 2.3 MG/DL (ref 1.6–2.3)
MCH RBC QN AUTO: 28.9 PG (ref 26.5–33)
MCH RBC QN AUTO: 29 PG (ref 26.5–33)
MCHC RBC AUTO-ENTMCNC: 30.3 G/DL (ref 31.5–36.5)
MCHC RBC AUTO-ENTMCNC: 31.4 G/DL (ref 31.5–36.5)
MCV RBC AUTO: 93 FL (ref 78–100)
MCV RBC AUTO: 96 FL (ref 78–100)
MUCOUS THREADS #/AREA URNS LPF: PRESENT /LPF
NITRATE UR QL: NEGATIVE
O2/TOTAL GAS SETTING VFR VENT: 21 %
O2/TOTAL GAS SETTING VFR VENT: 21 %
O2/TOTAL GAS SETTING VFR VENT: ABNORMAL %
OXYHGB MFR BLD: 98 % (ref 92–100)
OXYHGB MFR BLDV: 35 %
OXYHGB MFR BLDV: 48 %
OXYHGB MFR BLDV: 54 %
OXYHGB MFR BLDV: 60 %
PCO2 BLD: 45 MM HG (ref 35–45)
PCO2 BLDV: 45 MM HG (ref 40–50)
PCO2 BLDV: 46 MM HG (ref 40–50)
PCO2 BLDV: 48 MM HG (ref 40–50)
PCO2 BLDV: 51 MM HG (ref 40–50)
PH BLD: 7.44 PH (ref 7.35–7.45)
PH BLDV: 7.41 PH (ref 7.32–7.43)
PH BLDV: 7.41 PH (ref 7.32–7.43)
PH BLDV: 7.42 PH (ref 7.32–7.43)
PH BLDV: 7.42 PH (ref 7.32–7.43)
PH UR STRIP: 5.5 PH (ref 5–7)
PHOSPHATE SERPL-MCNC: 2.1 MG/DL (ref 2.5–4.5)
PHOSPHATE SERPL-MCNC: 3.2 MG/DL (ref 2.5–4.5)
PLATELET # BLD AUTO: 262 10E9/L (ref 150–450)
PLATELET # BLD AUTO: 293 10E9/L (ref 150–450)
PO2 BLD: 138 MM HG (ref 80–105)
PO2 BLDV: 23 MM HG (ref 25–47)
PO2 BLDV: 28 MM HG (ref 25–47)
PO2 BLDV: 31 MM HG (ref 25–47)
PO2 BLDV: 34 MM HG (ref 25–47)
POTASSIUM SERPL-SCNC: 3.6 MMOL/L (ref 3.4–5.3)
POTASSIUM SERPL-SCNC: 3.6 MMOL/L (ref 3.4–5.3)
POTASSIUM SERPL-SCNC: 3.7 MMOL/L (ref 3.4–5.3)
PROT SERPL-MCNC: 5.7 G/DL (ref 6.8–8.8)
RBC # BLD AUTO: 3.1 10E12/L (ref 4.4–5.9)
RBC # BLD AUTO: 3.18 10E12/L (ref 4.4–5.9)
RBC #/AREA URNS AUTO: 4 /HPF (ref 0–2)
SODIUM SERPL-SCNC: 144 MMOL/L (ref 133–144)
SODIUM SERPL-SCNC: 147 MMOL/L (ref 133–144)
SOURCE: ABNORMAL
SP GR UR STRIP: 1.02 (ref 1–1.03)
SPECIMEN SOURCE: ABNORMAL
SPECIMEN SOURCE: NORMAL
SPECIMEN SOURCE: NORMAL
TRANS CELLS #/AREA URNS HPF: <1 /HPF (ref 0–1)
UROBILINOGEN UR STRIP-MCNC: NORMAL MG/DL (ref 0–2)
WBC # BLD AUTO: 13.9 10E9/L (ref 4–11)
WBC # BLD AUTO: 15.1 10E9/L (ref 4–11)
WBC #/AREA URNS AUTO: 2 /HPF (ref 0–5)

## 2020-02-22 PROCEDURE — 80048 BASIC METABOLIC PNL TOTAL CA: CPT | Performed by: INTERNAL MEDICINE

## 2020-02-22 PROCEDURE — 25800030 ZZH RX IP 258 OP 636: Performed by: STUDENT IN AN ORGANIZED HEALTH CARE EDUCATION/TRAINING PROGRAM

## 2020-02-22 PROCEDURE — 99291 CRITICAL CARE FIRST HOUR: CPT | Mod: GC | Performed by: INTERNAL MEDICINE

## 2020-02-22 PROCEDURE — 20000004 ZZH R&B ICU UMMC

## 2020-02-22 PROCEDURE — 25000132 ZZH RX MED GY IP 250 OP 250 PS 637: Mod: GY

## 2020-02-22 PROCEDURE — 83735 ASSAY OF MAGNESIUM: CPT | Performed by: INTERNAL MEDICINE

## 2020-02-22 PROCEDURE — 40000014 ZZH STATISTIC ARTERIAL MONITORING DAILY

## 2020-02-22 PROCEDURE — 40000048 ZZH STATISTIC DAILY SWAN MONITORING

## 2020-02-22 PROCEDURE — 25000128 H RX IP 250 OP 636: Performed by: STUDENT IN AN ORGANIZED HEALTH CARE EDUCATION/TRAINING PROGRAM

## 2020-02-22 PROCEDURE — 84132 ASSAY OF SERUM POTASSIUM: CPT

## 2020-02-22 PROCEDURE — 83735 ASSAY OF MAGNESIUM: CPT

## 2020-02-22 PROCEDURE — 81001 URINALYSIS AUTO W/SCOPE: CPT | Performed by: STUDENT IN AN ORGANIZED HEALTH CARE EDUCATION/TRAINING PROGRAM

## 2020-02-22 PROCEDURE — 40000275 ZZH STATISTIC RCP TIME EA 10 MIN

## 2020-02-22 PROCEDURE — C9113 INJ PANTOPRAZOLE SODIUM, VIA: HCPCS | Performed by: STUDENT IN AN ORGANIZED HEALTH CARE EDUCATION/TRAINING PROGRAM

## 2020-02-22 PROCEDURE — 36415 COLL VENOUS BLD VENIPUNCTURE: CPT | Performed by: STUDENT IN AN ORGANIZED HEALTH CARE EDUCATION/TRAINING PROGRAM

## 2020-02-22 PROCEDURE — 40000196 ZZH STATISTIC RAPCV CVP MONITORING

## 2020-02-22 PROCEDURE — 97530 THERAPEUTIC ACTIVITIES: CPT | Mod: GP | Performed by: PHYSICAL THERAPIST

## 2020-02-22 PROCEDURE — 82805 BLOOD GASES W/O2 SATURATION: CPT | Performed by: STUDENT IN AN ORGANIZED HEALTH CARE EDUCATION/TRAINING PROGRAM

## 2020-02-22 PROCEDURE — 92526 ORAL FUNCTION THERAPY: CPT | Mod: GN

## 2020-02-22 PROCEDURE — 27210437 ZZH NUTRITION PRODUCT SEMIELEM INTERMED LITER

## 2020-02-22 PROCEDURE — 85260 CLOT FACTOR X STUART-POWER: CPT | Performed by: INTERNAL MEDICINE

## 2020-02-22 PROCEDURE — 25000132 ZZH RX MED GY IP 250 OP 250 PS 637: Mod: GY | Performed by: STUDENT IN AN ORGANIZED HEALTH CARE EDUCATION/TRAINING PROGRAM

## 2020-02-22 PROCEDURE — 80076 HEPATIC FUNCTION PANEL: CPT | Performed by: INTERNAL MEDICINE

## 2020-02-22 PROCEDURE — 85610 PROTHROMBIN TIME: CPT | Performed by: INTERNAL MEDICINE

## 2020-02-22 PROCEDURE — 25000132 ZZH RX MED GY IP 250 OP 250 PS 637: Mod: GY | Performed by: THORACIC SURGERY (CARDIOTHORACIC VASCULAR SURGERY)

## 2020-02-22 PROCEDURE — 00000146 ZZHCL STATISTIC GLUCOSE BY METER IP

## 2020-02-22 PROCEDURE — 85730 THROMBOPLASTIN TIME PARTIAL: CPT | Performed by: STUDENT IN AN ORGANIZED HEALTH CARE EDUCATION/TRAINING PROGRAM

## 2020-02-22 PROCEDURE — 25000132 ZZH RX MED GY IP 250 OP 250 PS 637: Mod: GY | Performed by: INTERNAL MEDICINE

## 2020-02-22 PROCEDURE — 87040 BLOOD CULTURE FOR BACTERIA: CPT | Performed by: STUDENT IN AN ORGANIZED HEALTH CARE EDUCATION/TRAINING PROGRAM

## 2020-02-22 PROCEDURE — 85730 THROMBOPLASTIN TIME PARTIAL: CPT | Performed by: INTERNAL MEDICINE

## 2020-02-22 PROCEDURE — 84100 ASSAY OF PHOSPHORUS: CPT

## 2020-02-22 PROCEDURE — 71045 X-RAY EXAM CHEST 1 VIEW: CPT

## 2020-02-22 PROCEDURE — 85027 COMPLETE CBC AUTOMATED: CPT | Performed by: INTERNAL MEDICINE

## 2020-02-22 PROCEDURE — 84100 ASSAY OF PHOSPHORUS: CPT | Performed by: INTERNAL MEDICINE

## 2020-02-22 PROCEDURE — 97161 PT EVAL LOW COMPLEX 20 MIN: CPT | Mod: GP | Performed by: PHYSICAL THERAPIST

## 2020-02-22 RX ORDER — DEXTROSE MONOHYDRATE 100 MG/ML
INJECTION, SOLUTION INTRAVENOUS CONTINUOUS PRN
Status: DISCONTINUED | OUTPATIENT
Start: 2020-02-22 | End: 2020-03-20 | Stop reason: HOSPADM

## 2020-02-22 RX ORDER — MAGNESIUM OXIDE 400 MG/1
400 TABLET ORAL DAILY
Status: DISCONTINUED | OUTPATIENT
Start: 2020-02-23 | End: 2020-03-20 | Stop reason: HOSPADM

## 2020-02-22 RX ORDER — ASPIRIN 81 MG/1
81 TABLET, CHEWABLE ORAL DAILY
Status: DISCONTINUED | OUTPATIENT
Start: 2020-02-23 | End: 2020-03-08

## 2020-02-22 RX ORDER — ATORVASTATIN CALCIUM 20 MG/1
20 TABLET, FILM COATED ORAL EVERY EVENING
Status: DISCONTINUED | OUTPATIENT
Start: 2020-02-22 | End: 2020-03-20 | Stop reason: HOSPADM

## 2020-02-22 RX ORDER — UBIDECARENONE 100 MG
200 CAPSULE ORAL DAILY
Status: DISCONTINUED | OUTPATIENT
Start: 2020-02-23 | End: 2020-03-20 | Stop reason: HOSPADM

## 2020-02-22 RX ORDER — FLUOXETINE 20 MG/5ML
20 SOLUTION ORAL DAILY
Status: DISCONTINUED | OUTPATIENT
Start: 2020-02-23 | End: 2020-02-29

## 2020-02-22 RX ORDER — WARFARIN SODIUM 3 MG/1
3 TABLET ORAL
Status: COMPLETED | OUTPATIENT
Start: 2020-02-22 | End: 2020-02-22

## 2020-02-22 RX ORDER — FUROSEMIDE 10 MG/ML
80 INJECTION INTRAMUSCULAR; INTRAVENOUS ONCE
Status: COMPLETED | OUTPATIENT
Start: 2020-02-22 | End: 2020-02-22

## 2020-02-22 RX ORDER — LANOLIN ALCOHOL/MO/W.PET/CERES
3 CREAM (GRAM) TOPICAL
Status: DISCONTINUED | OUTPATIENT
Start: 2020-02-22 | End: 2020-03-20 | Stop reason: HOSPADM

## 2020-02-22 RX ORDER — FLUOXETINE 20 MG/5ML
20 SOLUTION ORAL DAILY
Status: DISCONTINUED | OUTPATIENT
Start: 2020-02-23 | End: 2020-02-22

## 2020-02-22 RX ORDER — CALCIUM CARBONATE 500 MG/1
500 TABLET, CHEWABLE ORAL DAILY PRN
Status: DISCONTINUED | OUTPATIENT
Start: 2020-02-22 | End: 2020-03-20 | Stop reason: HOSPADM

## 2020-02-22 RX ORDER — ALLOPURINOL 100 MG/1
200 TABLET ORAL DAILY
Status: DISCONTINUED | OUTPATIENT
Start: 2020-02-23 | End: 2020-03-20 | Stop reason: HOSPADM

## 2020-02-22 RX ORDER — LANOLIN ALCOHOL/MO/W.PET/CERES
100 CREAM (GRAM) TOPICAL DAILY
Status: DISCONTINUED | OUTPATIENT
Start: 2020-02-23 | End: 2020-02-24

## 2020-02-22 RX ORDER — ACETAMINOPHEN 325 MG/1
650 TABLET ORAL EVERY 4 HOURS PRN
Status: DISCONTINUED | OUTPATIENT
Start: 2020-02-22 | End: 2020-03-16

## 2020-02-22 RX ORDER — GABAPENTIN 100 MG/1
100 CAPSULE ORAL 3 TIMES DAILY
Status: DISCONTINUED | OUTPATIENT
Start: 2020-02-22 | End: 2020-02-28

## 2020-02-22 RX ADMIN — ASPIRIN 81 MG CHEWABLE TABLET 81 MG: 81 TABLET CHEWABLE at 07:44

## 2020-02-22 RX ADMIN — MUPIROCIN 1 G: 20 OINTMENT TOPICAL at 07:40

## 2020-02-22 RX ADMIN — AMPICILLIN SODIUM 2 G: 2 INJECTION, POWDER, FOR SOLUTION INTRAMUSCULAR; INTRAVENOUS at 07:34

## 2020-02-22 RX ADMIN — THIAMINE HCL TAB 100 MG 100 MG: 100 TAB at 07:45

## 2020-02-22 RX ADMIN — DICLOFENAC 4 G: 10 GEL TOPICAL at 07:40

## 2020-02-22 RX ADMIN — GABAPENTIN 100 MG: 100 CAPSULE ORAL at 20:12

## 2020-02-22 RX ADMIN — GABAPENTIN 100 MG: 100 CAPSULE ORAL at 07:45

## 2020-02-22 RX ADMIN — MELATONIN TAB 3 MG 3 MG: 3 TAB at 22:59

## 2020-02-22 RX ADMIN — EPINEPHRINE 0.01 MCG/KG/MIN: 1 INJECTION PARENTERAL at 13:22

## 2020-02-22 RX ADMIN — CEFTRIAXONE 2 G: 2 INJECTION, POWDER, FOR SOLUTION INTRAMUSCULAR; INTRAVENOUS at 09:46

## 2020-02-22 RX ADMIN — AMPICILLIN SODIUM 2 G: 2 INJECTION, POWDER, FOR SOLUTION INTRAMUSCULAR; INTRAVENOUS at 20:10

## 2020-02-22 RX ADMIN — MULTIVITAMIN 15 ML: LIQUID ORAL at 13:56

## 2020-02-22 RX ADMIN — POTASSIUM CHLORIDE 20 MEQ: 29.8 INJECTION, SOLUTION INTRAVENOUS at 15:46

## 2020-02-22 RX ADMIN — POTASSIUM CHLORIDE 20 MEQ: 29.8 INJECTION, SOLUTION INTRAVENOUS at 05:52

## 2020-02-22 RX ADMIN — GABAPENTIN 100 MG: 100 CAPSULE ORAL at 13:57

## 2020-02-22 RX ADMIN — DICLOFENAC 4 G: 10 GEL TOPICAL at 21:42

## 2020-02-22 RX ADMIN — AMPICILLIN SODIUM 2 G: 2 INJECTION, POWDER, FOR SOLUTION INTRAMUSCULAR; INTRAVENOUS at 02:42

## 2020-02-22 RX ADMIN — FUROSEMIDE 80 MG: 10 INJECTION, SOLUTION INTRAVENOUS at 14:03

## 2020-02-22 RX ADMIN — SENNOSIDES AND DOCUSATE SODIUM 1 TABLET: 8.6; 5 TABLET ORAL at 07:45

## 2020-02-22 RX ADMIN — ALLOPURINOL 200 MG: 100 TABLET ORAL at 07:45

## 2020-02-22 RX ADMIN — ATORVASTATIN CALCIUM 20 MG: 20 TABLET, FILM COATED ORAL at 20:12

## 2020-02-22 RX ADMIN — AMPICILLIN SODIUM 2 G: 2 INJECTION, POWDER, FOR SOLUTION INTRAMUSCULAR; INTRAVENOUS at 14:01

## 2020-02-22 RX ADMIN — DICLOFENAC 4 G: 10 GEL TOPICAL at 11:34

## 2020-02-22 RX ADMIN — ACETAMINOPHEN 650 MG: 325 TABLET, FILM COATED ORAL at 13:57

## 2020-02-22 RX ADMIN — MAGNESIUM OXIDE 400 MG: 400 TABLET ORAL at 07:45

## 2020-02-22 RX ADMIN — DOBUTAMINE 2.5 MCG/KG/MIN: 12.5 INJECTION, SOLUTION INTRAVENOUS at 08:22

## 2020-02-22 RX ADMIN — POTASSIUM CHLORIDE 20 MEQ: 29.8 INJECTION, SOLUTION INTRAVENOUS at 22:54

## 2020-02-22 RX ADMIN — PANTOPRAZOLE SODIUM 40 MG: 40 INJECTION, POWDER, FOR SOLUTION INTRAVENOUS at 07:36

## 2020-02-22 RX ADMIN — WARFARIN SODIUM 3 MG: 3 TABLET ORAL at 17:54

## 2020-02-22 RX ADMIN — CEFTRIAXONE 2 G: 2 INJECTION, POWDER, FOR SOLUTION INTRAMUSCULAR; INTRAVENOUS at 21:59

## 2020-02-22 RX ADMIN — FLUOXETINE 20 MG: 20 CAPSULE ORAL at 07:45

## 2020-02-22 RX ADMIN — BIVALIRUDIN 0.02 MG/KG/HR: 250 INJECTION, POWDER, LYOPHILIZED, FOR SOLUTION INTRAVENOUS at 17:26

## 2020-02-22 RX ADMIN — DICLOFENAC 4 G: 10 GEL TOPICAL at 15:03

## 2020-02-22 ASSESSMENT — ACTIVITIES OF DAILY LIVING (ADL)
ADLS_ACUITY_SCORE: 18

## 2020-02-22 ASSESSMENT — MIFFLIN-ST. JEOR: SCORE: 1755.63

## 2020-02-22 NOTE — PROGRESS NOTES
02/22/20 1100   Quick Adds   Type of Visit Initial PT Evaluation       Present no   Living Environment   Lives With spouse;grandchild(arnold)   Living Arrangements house   Home Accessibility stairs to enter home   Number of Stairs, Main Entrance 3   Stair Railings, Main Entrance railings on both sides of stairs   Transportation Anticipated car, drives self;family or friend will provide   Self-Care   Usual Activity Tolerance moderate   Current Activity Tolerance poor   Regular Exercise No   Equipment Currently Used at Home cane, straight   Functional Level Prior   Ambulation 0-->independent   Transferring 0-->independent   Fall history within last six months no   Prior Functional Level Comment independent with functional mobility for household and short community distances, intermittently uses SPC   General Information   Onset of Illness/Injury or Date of Surgery - Date 02/18/20   Referring Physician Iliana Rogers MD   Patient/Family Goals Statement return home   Pertinent History of Current Problem (include personal factors and/or comorbidities that impact the POC) Eliseo Tanner is a 64 year old male with PMH of ABHINAV, DM II, CKD stage 3, HTN, CAD s/p LVAD placement with Dr. Redd on 2/18 for chronic systolic heart failure 2/2 [facility]. Extubated 2/21.   Precautions/Limitations sternal precautions   Cognitive Status Examination   Orientation orientation to person, place and time   Level of Consciousness alert   Follows Commands and Answers Questions 100% of the time   Personal Safety and Judgment intact   Memory intact   Pain Assessment   Patient Currently in Pain Yes, see Vital Sign flowsheet   Posture    Posture Forward head position;Protracted shoulders   Range of Motion (ROM)   ROM Comment B LE grossly WNL   Strength   Strength Comments B LE grossly at least 4/5   Transfer Skills   Transfer Comments sit > stand with assist x 2   Balance   Balance Comments required assist x 2 to  "maintain standing balance   Sensory Examination   Sensory Perception no deficits were identified   General Therapy Interventions   Planned Therapy Interventions balance training;bed mobility training;gait training;strengthening;transfer training;progressive activity/exercise   Clinical Impression   Criteria for Skilled Therapeutic Intervention yes, treatment indicated   PT Diagnosis impaired functional mobility in setting of heart failure and s/p LVAD placement   Influenced by the following impairments decreased strength, impaired balance, pain, decreased activity tolerance   Functional limitations due to impairments impaired bed mobility, impaired transfers, impaired gait   Clinical Presentation Stable/Uncomplicated   Clinical Presentation Rationale clinical judgement   Clinical Decision Making (Complexity) Low complexity   Therapy Frequency 6x/week   Predicted Duration of Therapy Intervention (days/wks) 2 weeks   Anticipated Discharge Disposition Transitional Care Facility   Risk & Benefits of therapy have been explained Yes   Patient, Family & other staff in agreement with plan of care Yes   [facility] AM-PAC  \"6 Clicks\" V.2 Basic Mobility Inpatient Short Form   1. Turning from your back to your side while in a flat bed without using bedrails? 2 - A Lot   2. Moving from lying on your back to sitting on the side of a flat bed without using bedrails? 2 - A Lot   3. Moving to and from a bed to a chair (including a wheelchair)? 1 - Total   4. Standing up from a chair using your arms (e.g., wheelchair, or bedside chair)? 2 - A Lot   5. To walk in hospital room? 1 - Total   6. Climbing 3-5 steps with a railing? 1 - Total   Basic Mobility Raw Score (Score out of 24.Lower scores equate to lower levels of function) 9   Total Evaluation Time   Total Evaluation Time (Minutes) 6        02/22/20 1100   Quick Adds   Type of Visit Initial PT Evaluation       Present no   Living Environment   Lives " With spouse;grandchild(arnold)   Living Arrangements house   Home Accessibility stairs to enter home   Number of Stairs, Main Entrance 3   Stair Railings, Main Entrance railings on both sides of stairs   Transportation Anticipated car, drives self;family or friend will provide   Self-Care   Usual Activity Tolerance moderate   Current Activity Tolerance poor   Regular Exercise No   Equipment Currently Used at Home cane, straight   Functional Level Prior   Ambulation 0-->independent   Transferring 0-->independent   Fall history within last six months no   Prior Functional Level Comment independent with functional mobility for household and short community distances, intermittently uses SPC   General Information   Onset of Illness/Injury or Date of Surgery - Date 02/18/20   Referring Physician Iliana Rogers MD   Patient/Family Goals Statement return home   Pertinent History of Current Problem (include personal factors and/or comorbidities that impact the POC) Eliseo Tanner is a 64 year old male with PMH of ABHINAV, DM II, CKD stage 3, HTN, CAD s/p LVAD placement with Dr. Redd on 2/18 for chronic systolic heart failure 2/2 [facility]. Extubated 2/21.   Precautions/Limitations sternal precautions   Cognitive Status Examination   Orientation orientation to person, place and time   Level of Consciousness alert   Follows Commands and Answers Questions 100% of the time   Personal Safety and Judgment intact   Memory intact   Pain Assessment   Patient Currently in Pain Yes, see Vital Sign flowsheet   Posture    Posture Forward head position;Protracted shoulders   Range of Motion (ROM)   ROM Comment B LE grossly WNL   Strength   Strength Comments B LE grossly at least 4/5   Transfer Skills   Transfer Comments sit > stand with assist x 2   Balance   Balance Comments required assist x 2 to maintain standing balance   Sensory Examination   Sensory Perception no deficits were identified   General Therapy Interventions   Planned  "Therapy Interventions balance training;bed mobility training;gait training;strengthening;transfer training;progressive activity/exercise   Clinical Impression   Criteria for Skilled Therapeutic Intervention yes, treatment indicated   PT Diagnosis impaired functional mobility in setting of heart failure and s/p LVAD placement   Influenced by the following impairments decreased strength, impaired balance, pain, decreased activity tolerance   Functional limitations due to impairments impaired bed mobility, impaired transfers, impaired gait   Clinical Presentation Stable/Uncomplicated   Clinical Presentation Rationale clinical judgement   Clinical Decision Making (Complexity) Low complexity   Therapy Frequency 6x/week   Predicted Duration of Therapy Intervention (days/wks) 2 weeks   Anticipated Discharge Disposition Transitional Care Facility   Risk & Benefits of therapy have been explained Yes   Patient, Family & other staff in agreement with plan of care Yes   [facility] AM-PAC  \"6 Clicks\" V.2 Basic Mobility Inpatient Short Form   1. Turning from your back to your side while in a flat bed without using bedrails? 2 - A Lot   2. Moving from lying on your back to sitting on the side of a flat bed without using bedrails? 2 - A Lot   3. Moving to and from a bed to a chair (including a wheelchair)? 1 - Total   4. Standing up from a chair using your arms (e.g., wheelchair, or bedside chair)? 2 - A Lot   5. To walk in hospital room? 1 - Total   6. Climbing 3-5 steps with a railing? 1 - Total   Basic Mobility Raw Score (Score out of 24.Lower scores equate to lower levels of function) 9   Total Evaluation Time   Total Evaluation Time (Minutes) 6     "

## 2020-02-22 NOTE — PLAN OF CARE
Major Shift Events:  Pt alert, oriented x4. No neuro deficits noted. SVO2 60% this am, CI 2.7. Remains on epi 0.01, dobutamine 5, bivaldiruben 0.024. Swallowing fluids well. Good UOP. Minimal CT output.  Plan: wean vasoactive gtts as tolerated. Up to chair today?   For vital signs and complete assessments, please see documentation flowsheets.

## 2020-02-22 NOTE — PROGRESS NOTES
CLINICAL NUTRITION SERVICES - BRIEF NOTE      Nutrition Prescription     RECOMMENDATIONS FOR MDs/PROVIDERS TO ORDER:  Total free water per primary team       Recommendations already ordered by Registered Dietitian (RD):  Ordered Phosphorus and Magnesium  Do not start tube feeding if K+, Mg++ below normal/Po4 <1.9, replace electrolytes and recheck     Start TF @ 15 mL/hr and advance by 15 mL q 6 hours to goal of 80 mL/hr.   When patient reaches goal volume, may turn off and run cyclic schedule from 5pm-8AM    Impact Peptide @ 80 ml/hr x15 hours (5 pm-8am) to provide 1200 mL/day 1800 kcals   (24 kcal/kg/day), 113 g PRO (1.5 gm/kg/day), 924 ml free H2O, 77 g Fat (50% from MCTs), 168 g CHO and no Fiber daily.  --Water flush of 60 mL: q 4 hours for tube patency      Ordered Boost Plus @ 2:00    Future/Additional Recommendations:  Monitor oral intake and ability for patient to consume at least 60% of energy/protein needs (936 calories and 70 g protein) consistently before discontinuing enteral nutrition.     *Please see full assessment note from 2/17/20    New Findings:  Provider Order: Order TF per MNT guidelines  Feeding tube placed 2/21, per imaging 4th portion of duodenum     Diet Order: Dysphagia III with thin liquids   --Patient sipping on water, 25% of one intake yesterday     Labs:   Na 147 (H)  K+ 3.4       Interventions  Collaboration with other providers/Patient- Patient reported he was not happy about tube feeding and wanted other options. Writer explained supplemental nutrition is temporary and discussed not being connected 24 hours of the day to another line. Patient more agreeable     Enteral Nutrition - Initiate  Feeding tube flush  Medical food supplement therapy    RD to follow per protocol.    Ruth Silverman RD, LD  Weekend pager: [phone]

## 2020-02-22 NOTE — PROGRESS NOTES
D: Stopped by to see patient. No VAD related questions or concerns at this time.  I: Introduced myself and explained the general plan for education.  Tentative plan is for Wife, Daughter and wife's sister to be caregivers.  All parties live >3 hours away.  Discussed POC and provided support and listened to patient and care giver's thoughts and concerns.  P: Continue to follow patient and address any questions or concerns patient and or caregiver may have.  Will check in with pt and family Monday to plan education for the week.

## 2020-02-22 NOTE — PHARMACY-ANTICOAGULATION SERVICE
Clinical Pharmacy - Warfarin Dosing Consult     Pharmacy has been consulted to manage this patient s warfarin therapy.  Indication: LVAD/RVAD  Therapy Goal: Chr Factor 10: 20-40%;INR 2-3 after bivalirudin infusion completed  Provider/Team: [facility]  Warfarin Prior to Admission: No  Significant drug interactions: aspirin, amiodarone  Recent documented change in oral intake/nutrition: Yes(entral feeds started today- potential to interfere with warfarin absorption)  Dose Comments: chest tube in place- will dose warfarin more conservatively    INR   Date Value Ref Range Status   02/22/2020 1.57 (H) 0.86 - 1.14 Final   02/20/2020 1.26 (H) 0.86 - 1.14 Final     Chromogenic Factor 10   Date Value Ref Range Status   02/22/2020 116 70 - 130 % Final     Comment:     Therapeutic Range:  A Chromogenic Factor 10 level of approximately 20-40%   inversely correlates with an INR of 2-3 for patients receiving Warfarin.   Chromogenic Factor 10 levels below 20% indicate an INR greater than 3 and   levels above 40% indicate an INR less than 2.         Recommend warfarin 3 mg today.  Pharmacy will monitor Eliseo Tanner daily and order warfarin doses to achieve specified goal.      Please contact pharmacy as soon as possible if the warfarin needs to be held for a procedure or if the warfarin goals change.       Home

## 2020-02-22 NOTE — PROGRESS NOTES
CV ICU Progress Note  2/22/2020      CO-MORBIDITIES:   Patient Active Problem List   Diagnosis     Acute on chronic systolic congestive heart failure (H)     Anxiety     CKD (chronic kidney disease) stage 3, GFR 30-59 ml/min (H)     Coronary artery disease involving native coronary artery of native heart without angina pectoris     GERD (gastroesophageal reflux disease)     Gout     Hyperlipidemia with target LDL less than 100     Nonischemic cardiomyopathy (H)     Non-nephrotic range proteinuria     ABHINAV on CPAP     Type 2 diabetes mellitus with stage 3 chronic kidney disease, without long-term current use of insulin (H)     Heart failure (H)     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 Redd on 2/18 for chronic systolic heart failure 2/2 NICM. Extubated 2/21.    PLAN FOR TODAY:  - wean dobutamine/epi per Cards II  - remove HD line  - duresis    PLAN:  Neuro/ pain/ sedation:  - Monitor neurological status. Notify the MD for any acute changes in exam.  -dilaudid, tylenol, gabapentin  - continue home fluoxetine    Pulmonary care:   #COPD  - extubated 2/21  - ABHINAV on CPAP, CPAP overnight ordered    Cardiovascular:    #Moderate CAD ([facility] 11/2019 nonobstructive CAD w/ severe branch disease)  #CHF EF 15-20% s/p LVAD placement  #Vfib this admission, s/p amio load  - Monitor hemodynamic status.   - epi, dobutamine gtt for MAP >65. Wean epi today per cards II  - Hold PTA entresto, spironolactone  - continue ASA 81 mg, atorvastatin  - continue amio 400 mg BID  - Jeramy weaned 2/21    GI care:   - dysphagia 3 diet  - NJ for TF, will remove today if eating goes well    Fluids/ Electrolytes/ Nutrition:   - TKO for IV fluid hydration  - No indication for parenteral nutrition.    Renal/ Fluid Balance:    #CKD III  - Urine output is adequate so far.  - Will continue to monitor intake  and output.    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     Patient seen, findings and plan discussed with CV ICU staff, Dr. Garcia.    Scott Giron MD  CA-3/PGY-4 Anesthesiology  [phone]      ====================================    PAST MEDICAL HISTORY: No past medical history on file.    PAST SURGICAL HISTORY:   Past Surgical History:   Procedure Laterality Date     CV CENTRAL VENOUS CATHETER PLACEMENT N/A 2/13/2020    Procedure: Central Venous Catheter Placement;  Surgeon: Chente Moss MD;  Location:  HEART CARDIAC CATH LAB     CV INTRA-AORTIC BALLOON PUMP INSERTION N/A 2/7/2020    Procedure: Intra-Aortic Balloon Pump Insertion;  Surgeon: Jose Baldwin MD;  Location:  HEART CARDIAC CATH LAB     CV INTRA-AORTIC BALLOON PUMP INSERTION N/A 2/13/2020    Procedure: Intra-Aortic Balloon Pump Insertion;  Surgeon: Chente Moss MD;  Location:  HEART CARDIAC CATH LAB     CV SWAN LUCIANA PROCEDURE N/A 2/13/2020    Procedure: Rew Luciana Procedure;  Surgeon: Chente Moss MD;  Location:  HEART CARDIAC CATH LAB     INSERT VENTRICULAR ASSIST DEVICE LEFT (HEARTMATE II) N/A 2/18/2020    Procedure: INSERTION, LEFT VENTRICULAR ASSIST DEVICE (HEARTMATE III);  Surgeon: Mac Jaramillo MD;  Location:  OR       FAMILY HISTORY: No family history on  file.    SOCIAL HISTORY:   Social History     Tobacco Use     Smoking status: Not on file   Substance Use Topics     Alcohol use: Not on file         OBJECTIVE:   1. VITAL SIGNS:   Temp:  [100  F (37.8  C)-100.8  F (38.2  C)] 100.8  F (38.2  C)  Heart Rate:  [] 92  Resp:  [18-22] 22  MAP:  [76 mmHg-106 mmHg] 100 mmHg  Arterial Line BP: ()/(57-94) 118/88  SpO2:  [94 %-100 %] 99 %    Ventilation Mode: (S) CPAP/PS  (Continuous positive airway pressure with Pressure Support)  FiO2 (%): 40 %  Rate Set (breaths/minute): 12 breaths/min  Tidal Volume Set (mL): 500 mL  PEEP (cm H2O): 5 cmH2O  Pressure Support (cm H2O): 7 cmH2O  Oxygen Concentration (%): 40 %  Resp: 22        2. INTAKE/ OUTPUT:   I/O last 3 completed shifts:  In: 2057.67 [P.O.:445; I.V.:1482.67; NG/GT:130]  Out: 3130 [Urine:2590; Chest Tube:540]    3. PHYSICAL EXAMINATION:   General: NAD, laying in hospital bed  Neuro: no focal deficit, follows commands in all 4 extremities  Resp: non labored breathing on NC  CV: RRR  Abdomen: Soft, Non-distended, Non-tender  Incisions: c/d/i  Extremities: warm and well perfused, 1+ edema    4. INVESTIGATIONS:   Arterial Blood Gases     Recent Labs   Lab 02/22/20  0333 02/21/20  1956 02/21/20  1520  02/20/20  1414  02/19/20  2335   WBC 13.9*  --  14.2*   < >  --    < > 7.9   HGB 9.0*  --  8.9*   < >  --    < > 9.1*     --  257   < >  --    < > 158   INR  --   --   --   --  1.26*  --  1.31*   * 147* 149*   < >  --    < >  --    POTASSIUM 3.7  --  3.6   < >  --    < > 4.0   BUN 35*  --  36*   < >  --    < > 32*   CR 1.65*  --  1.80*   < >  --    < > 1.71*    < > = values in this interval not displayed.           5. RADIOLOGY:     =========================================

## 2020-02-22 NOTE — PROGRESS NOTES
Advanced Heart Failure Consult Progress Note  Eliseo Tanner MRN: [id number]  Age: 64 year old, : 1955  Date: 2020              Assessment and Plan:     Mr Eliseo Tanner is a 63yo M w/PMHx notable for chronic systolic heart failure, NICM, moderate CAD, HTN, ABHINAV on CPAP, DM2, CKDIII who is transferred to [facility] for evaluation and management of advanced heart failure with arrhythmia now s/p HM 3 on .    Today's plan ()  -AM wean of dobutamine to 2.5  -Remove HD internal jugular catheter  -If hemodynamics stable in PM, then stop EPI and pull arterial line  -Start warfarin; following chromogenic factor X (bival can artificially increase INR)  -Increased speed to 5300 on LVAD in AM  -Lasix 80 mg IV once    Moderate CAD  Severe Mitral regurgitation  Chronic nonischemic systolic heart failure w/ reduced EF (15-20%) and exacerbation  NYHA Class III. Echo 2020: LVEF 15-20%; LVEDd 5.9 cm; 4+ MR. [facility] 2019 w/ nonobstructive CAD w/ severe branch disease. Presented with increased wt gain, SOB, LE edema concerning for HF exacerbation, initially concerning for cardiogenic shock. Admitted to [facility] for further evaluation regarding need for advanced HF therapies. Patient is now s/p HM III placement on  with early post-op course complicated by bleeding that is slowing down as of  AM.   -Trend Hbg q6h and transfuse for Hgb <7.0  -Monitor fibrinogen, INR, platelets  -Continue ASA 81, atorvastatin 20 mg  -AM wean of dobutamine to 2.5  -Remove HD internal jugular catheter  -If hemodynamics stable in PM, then stop EPI and pull arterial line  -Start warfarin  -Increased speed to 5300 on LVAD in AM  -Lasix 80 mg IV once    Proteus and Enterococcus bacteremia  Organisms growing from 2/2 blood cultures from . Blood cultured from  NGTD and 2/15 growing 1/2 S.epi, likely contaminant per ID.  ID consulted, appreciate help. Will decide on final duration of abx.  -daily blood  "cultures until negative  -Zosyn (2/13-2/14)  -Tobramycin (2/13-2/14)  -Vancomycin (2/13-2/17  -Meropenem (2/14-2/15)  -Ceftriaxone (2/16-  -Ampicillin (2/16-    #Thrombocytopenia:  Concern for HIT given timing with respect to heparin exposure. HIT positive DAVINA negative. Antibody is now negative x2, thus no further indication for PLAX  -Heme consulted  -Bivalirudin gtt after LVAD surgery    VFib requiring shock  Shocked x 3 prior to transfer, loaded with amio. No known prior history. Suspect related to underlying HF.   -Keep K>4, Mg>2  -Amio 400 mg PO QD  -Need ICD for secondary prevention     Chronic:  ABHINAV: Home CPAP  Gout: PTA allopurinol 200 daily  MDD: PTA fluoxetine  GERD: PTA PPI  COPD: albuterol PRN    FEN: 2gm Na   2L water restriction    PPX: Continue bival and start warfarin on 2/22    CODE: FULL CODE     Stanford Acuna M.D.  Cardiology fellow             Subjective/Interval Events     No acute overnight events. This AM, patient denying pain, SOB, CP, lightheadedness.          Objective     BP (!) 72/53   Pulse 98   Temp 100.4  F (38  C)   Resp 22   Ht 1.702 m (5' 7\")   Wt 101.7 kg (224 lb 3.3 oz)   SpO2 99%   BMI 35.12 kg/m    Temp:  [100  F (37.8  C)-100.6  F (38.1  C)] 100.4  F (38  C)  Heart Rate:  [] 89  Resp:  [18-22] 22  MAP:  [76 mmHg-100 mmHg] 94 mmHg  Arterial Line BP: ()/(57-91) 120/78  SpO2:  [94 %-100 %] 99 %  Wt Readings from Last 2 Encounters:   02/22/20 101.7 kg (224 lb 3.3 oz)     I/O last 3 completed shifts:  In: 2057.67 [P.O.:445; I.V.:1482.67; NG/GT:130]  Out: 3130 [Urine:2590; Chest Tube:540]      Gen: No acute distress  HEENT: NC/AT  PULM/THORAX: CTAB  CV: normal rate, regular rhythm, normal S1 and S2, LVAD hum  ABD: Soft, NTND, bowel sounds present, no masses  EXT: WWP. No LE edema, clubbing or cyanosis.  NEURO: A&Ox3                Data:     Recent Results (from the past 24 hour(s))   Glucose by meter    Collection Time: 02/21/20 10:06 AM   Result Value Ref Range    " Glucose 122 (H) 70 - 99 mg/dL   Blood gas arterial and oxyhgb    Collection Time: 02/21/20 10:07 AM   Result Value Ref Range    pH Arterial 7.47 (H) 7.35 - 7.45 pH    pCO2 Arterial 40 35 - 45 mm Hg    pO2 Arterial 141 (H) 80 - 105 mm Hg    Bicarbonate Arterial 29 (H) 21 - 28 mmol/L    FIO2 40     Oxyhemoglobin Arterial 98 92 - 100 %    Base Excess Art 4.7 mmol/L   Blood gas arterial and oxyhgb    Collection Time: 02/21/20 11:13 AM   Result Value Ref Range    pH Arterial 7.45 7.35 - 7.45 pH    pCO2 Arterial 43 35 - 45 mm Hg    pO2 Arterial 114 (H) 80 - 105 mm Hg    Bicarbonate Arterial 29 (H) 21 - 28 mmol/L    FIO2 2L     Oxyhemoglobin Arterial 97 92 - 100 %    Base Excess Art 4.9 mmol/L   Glucose by meter    Collection Time: 02/21/20 11:17 AM   Result Value Ref Range    Glucose 136 (H) 70 - 99 mg/dL   Glucose by meter    Collection Time: 02/21/20  3:19 PM   Result Value Ref Range    Glucose 130 (H) 70 - 99 mg/dL   Basic metabolic panel    Collection Time: 02/21/20  3:20 PM   Result Value Ref Range    Sodium 149 (H) 133 - 144 mmol/L    Potassium 3.6 3.4 - 5.3 mmol/L    Chloride 114 (H) 94 - 109 mmol/L    Carbon Dioxide 29 20 - 32 mmol/L    Anion Gap 6 3 - 14 mmol/L    Glucose 140 (H) 70 - 99 mg/dL    Urea Nitrogen 36 (H) 7 - 30 mg/dL    Creatinine 1.80 (H) 0.66 - 1.25 mg/dL    GFR Estimate 38 (L) >60 mL/min/[1.73_m2]    GFR Estimate If Black 44 (L) >60 mL/min/[1.73_m2]    Calcium 7.7 (L) 8.5 - 10.1 mg/dL   CBC with platelets    Collection Time: 02/21/20  3:20 PM   Result Value Ref Range    WBC 14.2 (H) 4.0 - 11.0 10e9/L    RBC Count 3.09 (L) 4.4 - 5.9 10e12/L    Hemoglobin 8.9 (L) 13.3 - 17.7 g/dL    Hematocrit 28.0 (L) 40.0 - 53.0 %    MCV 91 78 - 100 fl    MCH 28.8 26.5 - 33.0 pg    MCHC 31.8 31.5 - 36.5 g/dL    RDW 16.5 (H) 10.0 - 15.0 %    Platelet Count 257 150 - 450 10e9/L   EKG 12-lead, tracing only    Collection Time: 02/21/20  4:28 PM   Result Value Ref Range    Interpretation ECG Click View Image link to  view waveform and result    Sodium    Collection Time: 02/21/20  7:56 PM   Result Value Ref Range    Sodium 147 (H) 133 - 144 mmol/L   Glucose by meter    Collection Time: 02/21/20  7:56 PM   Result Value Ref Range    Glucose 207 (H) 70 - 99 mg/dL   Glucose by meter    Collection Time: 02/21/20 11:50 PM   Result Value Ref Range    Glucose 155 (H) 70 - 99 mg/dL   Glucose by meter    Collection Time: 02/22/20  3:32 AM   Result Value Ref Range    Glucose 129 (H) 70 - 99 mg/dL   Basic metabolic panel    Collection Time: 02/22/20  3:33 AM   Result Value Ref Range    Sodium 147 (H) 133 - 144 mmol/L    Potassium 3.7 3.4 - 5.3 mmol/L    Chloride 112 (H) 94 - 109 mmol/L    Carbon Dioxide 31 20 - 32 mmol/L    Anion Gap 3 3 - 14 mmol/L    Glucose 136 (H) 70 - 99 mg/dL    Urea Nitrogen 35 (H) 7 - 30 mg/dL    Creatinine 1.65 (H) 0.66 - 1.25 mg/dL    GFR Estimate 42 (L) >60 mL/min/[1.73_m2]    GFR Estimate If Black 49 (L) >60 mL/min/[1.73_m2]    Calcium 7.7 (L) 8.5 - 10.1 mg/dL   Partial thromboplastin time    Collection Time: 02/22/20  3:33 AM   Result Value Ref Range    PTT 59 (H) 22 - 37 sec   CBC with platelets    Collection Time: 02/22/20  3:33 AM   Result Value Ref Range    WBC 13.9 (H) 4.0 - 11.0 10e9/L    RBC Count 3.10 (L) 4.4 - 5.9 10e12/L    Hemoglobin 9.0 (L) 13.3 - 17.7 g/dL    Hematocrit 28.7 (L) 40.0 - 53.0 %    MCV 93 78 - 100 fl    MCH 29.0 26.5 - 33.0 pg    MCHC 31.4 (L) 31.5 - 36.5 g/dL    RDW 16.7 (H) 10.0 - 15.0 %    Platelet Count 262 150 - 450 10e9/L   Blood gas venous with oxyhemoglobin (PCU Collect TBD)    Collection Time: 02/22/20  3:33 AM   Result Value Ref Range    Ph Venous 7.42 7.32 - 7.43 pH    PCO2 Venous 46 40 - 50 mm Hg    PO2 Venous 34 25 - 47 mm Hg    Bicarbonate Venous 29 (H) 21 - 28 mmol/L    FIO2 2L     Oxyhemoglobin Venous 60 %    Base Excess Venous 4.3 mmol/L   Blood gas arterial and oxyhgb    Collection Time: 02/22/20  3:33 AM   Result Value Ref Range    pH Arterial 7.44 7.35 - 7.45 pH     pCO2 Arterial 45 35 - 45 mm Hg    pO2 Arterial 138 (H) 80 - 105 mm Hg    Bicarbonate Arterial 31 (H) 21 - 28 mmol/L    FIO2 2L     Oxyhemoglobin Arterial 98 92 - 100 %    Base Excess Art 5.7 mmol/L   Glucose by meter    Collection Time: 20  8:05 AM   Result Value Ref Range    Glucose 155 (H) 70 - 99 mg/dL       Recent Results (from the past 24 hour(s))   Echocardiogram Complete    Narrative    [id number]  [id number]  [id number]  [id number]^JIM           [facility],[place]  Echocardiography Laboratory  [address] [place] [address]     Name: WOLFGANG LEACH  MRN: [id number]  : 1955  Study Date: 2020 10:06 AM  Age: 64 yrs  Gender: Male  Patient Location: [facility]  Reason For Study: Heart Failure  Ordering Physician: NAHUN GUTIERREZ  Referring Physician: SELF, REFERRED  Performed By: Yvonne Adan RDCS     BSA: 2.2 m2  Height: 67 in  Weight: 244 lb  HR: 103  BP: 72/52 mmHg  _____________________________________________________________________________  __        Procedure  Complete Portable Echo Adult. Contrast Optison. Optison (NDC #6745-9024-41)  given intravenously. Patient was given 6 ml mixture of 3 ml Optison and 6 ml  saline. 3 ml wasted.  _____________________________________________________________________________  __        Interpretation Summary  Left ventricular size is normal.  LVEF 20% based on biplane 2D tracing.  Mild to moderate right ventricular dilation is present.  Global right ventricular function is moderately reduced.  Pulmonary artery systolic pressure is normal.  Dilation of the inferior vena cava is present with abnormal respiratory  variation in diameter.  _____________________________________________________________________________  __        Left Ventricle  Left ventricular size is normal. LVEF 20% based on biplane 2D tracing. Left  ventricular wall thickness is normal. Diastolic function not assessed due to  frequent ectopy.  Abnormal septal motion consistent with left bundle branch  block is present.     Right Ventricle  Mild to moderate right ventricular dilation is present. Global right  ventricular function is moderately reduced.     Atria  Mild biatrial enlargement is present.     Mitral Valve  Moderate mitral insufficiency is present.        Aortic Valve  Aortic valve is normal in structure and function.     Tricuspid Valve  Moderate tricuspid insufficiency is present. The right ventricular systolic  pressure is approximated at 24.4 mmHg plus the right atrial pressure.  Pulmonary artery systolic pressure is normal.     Pulmonic Valve  The pulmonic valve cannot be assessed. Mild pulmonic insufficiency is present.     Vessels  The aorta root is normal. The pulmonary artery cannot be assessed. Dilation of  the inferior vena cava is present with abnormal respiratory variation in  diameter.     Compared to Previous Study  Previous study not available for comparison.     _____________________________________________________________________________  __  MMode/2D Measurements & Calculations  IVSd: 0.61 cm  LVIDd: 4.7 cm  LVIDs: 3.7 cm  LVPWd: 0.75 cm  FS: 21.7 %  LV mass(C)d: 99.1 grams  LV mass(C)dI: 45.0 grams/m2     Ao root diam: 2.9 cm  asc Aorta Diam: 2.5 cm  LVOT diam: 1.9 cm  LVOT area: 2.8 cm2     EF(MOD-bp): 21.5 %  LA Volume (BP): 84.8 ml  LA Volume Index (BP): 38.5 ml/m2  RWT: 0.32        Doppler Measurements & Calculations  PA acc time: 0.09 sec     PI end-d saumya: 154.0 cm/sec  TR max saumya: 247.0 cm/sec  TR max P.4 mmHg     _____________________________________________________________________________  __           Report approved by: Graciela Tomlinson 2020 12:05 PM      XR Chest Port 1 View    Narrative    XR CHEST PORT 1 VW  2020 4:21 PM      HISTORY: SG Placement    COMPARISON: None available    FINDINGS: Single AP chest radiograph. [place] catheter tip is in the  proximal right main pulmonary artery. Right  central line tip and right  upper extremity PICC line tip at the lower SVC. Trachea is midline,  cardiomegaly. Bilateral perihilar streaky opacities. Mild increased  interstitial opacities in the right lung with ill-defined pulmonary  vascularity. No pneumothorax or pleural effusion. No acute osseous or  abdominal abnormality. Elevated left hemidiaphragm.      Impression    IMPRESSION:  1. [place] catheter tip at the proximal right main pulmonary artery.  2. Cardiomegaly with mild pulmonary edema, especially on the right.    I have personally reviewed the examination and initial interpretation  and I agree with the findings.    DONG SOLORIO MD   Cardiac Catheterization    Narrative      Successful insertion of a IABP in the RFA                Medications     Current Facility-Administered Medications   Medication     acetaminophen (TYLENOL) tablet 650 mg     albuterol (PROAIR HFA/PROVENTIL HFA/VENTOLIN HFA) 108 (90 Base) MCG/ACT inhaler 2 puff     allopurinol (ZYLOPRIM) tablet 200 mg     amiodarone (NEXTERONE) 1,500 mg in D5W 250 mL infusion     ampicillin (OMNIPEN) 2 g vial to attach to  ml bag     aspirin (ASA) chewable tablet 81 mg     atorvastatin (LIPITOR) tablet 20 mg     bisacodyl (DULCOLAX) EC tablet 5 mg    Or     bisacodyl (DULCOLAX) EC tablet 10 mg    Or     bisacodyl (DULCOLAX) EC tablet 15 mg     bivalirudin (ANGIOMAX) 250 mg in sodium chloride 0.9 % 250 mL ANTICOAGULANT infusion     calcium carbonate (TUMS) chewable tablet 500 mg     cefTRIAXone (ROCEPHIN) 2 g vial to attach to  ml bag for ADULTS or NS 50 ml bag for PEDS     co-enzyme Q-10 capsule 200 mg     dextrose 10% infusion     glucose gel 15-30 g    Or     dextrose 50 % injection 25-50 mL    Or     glucagon injection 1 mg     diclofenac (VOLTAREN) 1 % topical gel 4 g     DOBUTamine (DOBUTREX) 1,000 mg in D5W 250 mL infusion (adult max conc)     EPINEPHrine (ADRENALIN) 5 mg in sodium chloride 0.9 % 250 mL infusion      fentaNYL (PF) (SUBLIMAZE) injection 25 mcg     FLUoxetine (PROzac) capsule 20 mg     gabapentin (NEURONTIN) capsule 100 mg     hydrALAZINE (APRESOLINE) injection 10 mg     insulin aspart (NovoLOG) injection (RAPID ACTING)     lidocaine (LMX4) cream     lidocaine (PF) (XYLOCAINE) 1 % injection 10 mL     lidocaine 1 % 0.1-1 mL     magnesium oxide (MAG-OX) tablet 400 mg     magnesium sulfate 2 g in water intermittent infusion     magnesium sulfate 4 g in 100 mL sterile water (premade)     medication instruction     melatonin tablet 3 mg     meperidine (DEMEROL) injection 12.5-25 mg     naloxone (NARCAN) injection 0.1-0.4 mg     pantoprazole (PROTONIX) 40 mg IV push injection     polyethylene glycol (MIRALAX) Packet 17 g     potassium chloride (KLOR-CON) Packet 20-40 mEq     potassium chloride 10 mEq in 100 mL intermittent infusion with 10 mg lidocaine     potassium chloride 10 mEq in 100 mL sterile water intermittent infusion (premix)     potassium chloride 20 mEq in 50 mL intermittent infusion     potassium chloride ER (KLOR-CON M) CR tablet 20-40 mEq     Reason beta blocker order not selected     senna-docusate (SENOKOT-S/PERICOLACE) 8.6-50 MG per tablet 1 tablet    Or     senna-docusate (SENOKOT-S/PERICOLACE) 8.6-50 MG per tablet 2 tablet     sodium chloride (PF) 0.9% PF flush 3 mL     sodium chloride (PF) 0.9% PF flush 3 mL     vitamin B1 (THIAMINE) tablet 100 mg

## 2020-02-22 NOTE — PROGRESS NOTES
02/21/20 1718   General Information   Onset Date 02/18/20   Start of Care Date 02/21/20   Referring Physician Scott Giron MD   Patient Profile Review/OT: Additional Occupational Profile Info See Profile for full history and prior level of function   Patient/Family Goals Statement Pt wants water   Swallowing Evaluation Bedside swallow evaluation   Behaviorial Observations Lethargic   Mode of current nutrition NJ   Respiratory Status O2 Supply   Type of O2 supply Nasal cannula  (2LPM)   Comments SLP: Pt is a 63yo M w/PMHx notable for chronic systolic heart failure, NICM, moderate CAD, HTN, ABHINAV on CPAP, DM2, CKDIII who is transferred to [facility] for evaluation and management of advanced heart failure with arrhythmia now s/p HM 3 on 2/18. Extubated today. Clinical swallow eval completed per MD order.    Clinical Swallow Evaluation   Oral Musculature generally intact   Structural Abnormalities none present   Dentition present and adequate   Mucosal Quality dry   Mandibular Strength and Mobility intact   Oral Labial Strength and Mobility WFL   Lingual Strength and Mobility WFL   Velar Elevation intact   Buccal Strength and Mobility intact   Laryngeal Function Cough;Throat clear;Swallow;Voicing initiated   Oral Musculature Comments WFL, strong voice WFL and good productive cough   Swallow Eval   Feeding Assistance frequent cues/help required   Clinical Swallow Eval: Thin Liquid Texture Trial   Mode of Presentation, Thin Liquids cup;straw;fed by clinician   Volume of Liquid or Food Presented 4oz thin water   Oral Phase of Swallow WFL   Pharyngeal Phase of Swallow coughing/choking  (on initial cup sip, no further s/sx of aspiration)   Diagnostic Statement Oral phase WFL. Pharyngeal phase characterized by strong cough on initial sip of thin liquids but no further s/sx of aspiration   Clinical Swallow Eval: Puree Solid Texture Trial   Mode of Presentation, Puree spoon;fed by clinician   Volume of Puree Presented 3oz pudding    Oral Phase, Puree WFL   Pharyngeal Phase, Puree intact   Diagnostic Statement WFL, no overt s/sx of aspiration   Clinical Swallow Eval: Solid Food Texture Trial   Mode of Presentation, Solid fed by clinician   Volume of Solid Food Presented 1/2 cracker   Oral Phase, Solid   (Prolonged masticiation, likely impacted by fatigue)   Pharyngeal Phase, Solid intact   Diagnostic Statement Oral phase prolonged likely 2/2 fatigue. Pharyngeal phase WFL.   Esophageal Phase of Swallow   Patient reports or presents with symptoms of esophageal dysphagia No   General Therapy Interventions   Planned Therapy Interventions Dysphagia Treatment   Dysphagia treatment Oropharyngeal exercise training;Modified diet education;Instruction of safe swallow strategies;Compensatory strategies for swallowing   Swallow Eval: Clinical Impressions   Skilled Criteria for Therapy Intervention Skilled criteria met.  Treatment indicated.   Functional Assessment Scale (FAS) 5   Treatment Diagnosis Mild dysphagia s/p extubation exacerbated by fatigue/lethargy   Diet texture recommendations Dysphagia diet level 3;Thin liquids   Recommended Feeding/Eating Techniques alternate between small bites and sips of food/liquid;small sips/bites  (slow rate, upright for all PO)   Demonstrates Need for Referral to Another Service dietitian;occupational therapy;physical therapy   Therapy Frequency 5x/week   Predicted Duration of Therapy Intervention (days/wks) 2 weeks   Anticipated Discharge Disposition inpatient rehabilitation facility   Risks and Benefits of Treatment have been explained. Yes   Patient, family and/or staff in agreement with Plan of Care Yes   Clinical Impression Comments SLP: Clinical swallow eval completed per MD order. Pt presents with mild oropharyngeal dysphagia in setting of recent extubation and lethargy. Oral mech grossly functional, strong cough and voice WNL per daughter. Assessed with thin liquids, puree, and higher texture solids. Oral  phase WFL. Pharyngeal phase characterized by one strong cough on initial sip of thin liquids, no additional s/sx of aspiration during session. Recommend dysphagia 3 (chopped) diet/thin liquids with supervision. Ensure pt is fully alert and upright for all PO, taking small bites/sips at slow rate. SLP to follow.   Total Evaluation Time   Total Evaluation Time (Minutes) 16

## 2020-02-22 NOTE — PROGRESS NOTES
SPIRITUAL HEALTH SERVICES  SPIRITUAL ASSESSMENT Progress Note  [facility] ([place] 4E     Visited with pt and family in morning, and with daughter in late afternoon. In morning offered encouragement and prayer with pt, who was looking forward to potential extubation in afternoon. In afternoon I attempted visit with pt but was busy with cares. Daughter updated me regarding issues with swallowing and (hopefully temporary) necessity of feeding tube.     PLAN: will check in on pt again early next week.     Ad Mccoy) Hernandez Krause M.Div., [facility]  Staff   Pager [phone]

## 2020-02-22 NOTE — PROGRESS NOTES
CVTS Progress Note  2/22/2020      CO-MORBIDITIES:   Patient Active Problem List   Diagnosis     Acute on chronic systolic congestive heart failure (H)     Anxiety     CKD (chronic kidney disease) stage 3, GFR 30-59 ml/min (H)     Coronary artery disease involving native coronary artery of native heart without angina pectoris     GERD (gastroesophageal reflux disease)     Gout     Hyperlipidemia with target LDL less than 100     Nonischemic cardiomyopathy (H)     Non-nephrotic range proteinuria     ABHINAV on CPAP     Type 2 diabetes mellitus with stage 3 chronic kidney disease, without long-term current use of insulin (H)     Heart failure (H)     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 Redd on 2/18 for chronic systolic heart failure 2/2 NICM. Extubated 2/21.    PLAN FOR TODAY:  - wean dobutamine/epi per Cards II  - remove HD line  - duresis    PLAN:  Neuro/ pain/ sedation:  - Monitor neurological status. Notify the MD for any acute changes in exam.  -dilaudid, tylenol, gabapentin  - continue home fluoxetine    Pulmonary care:   #COPD  - extubated 2/21  - ABHINAV on CPAP, CPAP overnight ordered    Cardiovascular:    #Moderate CAD ([facility] 11/2019 nonobstructive CAD w/ severe branch disease)  #CHF EF 15-20% s/p LVAD placement  #Vfib this admission, s/p amio load  - Monitor hemodynamic status.   - epi, dobutamine gtt for MAP >65. Wean epi today per cards II  - Hold PTA entresto, spironolactone  - continue ASA 81 mg, atorvastatin  - continue amio 400 mg BID  - Jeramy weaned 2/21    GI care:   - dysphagia 3 diet  - NJ for TF, will remove today if eating goes well    Fluids/ Electrolytes/ Nutrition:   - TKO for IV fluid hydration  - No indication for parenteral nutrition.    Renal/ Fluid Balance:    #CKD III  - Urine output is adequate so far.  - Will continue to monitor intake and  output.    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     Patient seen, findings and plan discussed with CVTS fellow.    Scott Giron MD  CA-3/PGY-4 Anesthesiology  [phone]      ====================================    PAST MEDICAL HISTORY: No past medical history on file.    PAST SURGICAL HISTORY:   Past Surgical History:   Procedure Laterality Date     CV CENTRAL VENOUS CATHETER PLACEMENT N/A 2/13/2020    Procedure: Central Venous Catheter Placement;  Surgeon: Chente Moss MD;  Location:  HEART CARDIAC CATH LAB     CV INTRA-AORTIC BALLOON PUMP INSERTION N/A 2/7/2020    Procedure: Intra-Aortic Balloon Pump Insertion;  Surgeon: Jose Baldwin MD;  Location:  HEART CARDIAC CATH LAB     CV INTRA-AORTIC BALLOON PUMP INSERTION N/A 2/13/2020    Procedure: Intra-Aortic Balloon Pump Insertion;  Surgeon: Chente Moss MD;  Location:  HEART CARDIAC CATH LAB     CV SWAN LUCIANA PROCEDURE N/A 2/13/2020    Procedure: [place] Luciana Procedure;  Surgeon: Chente Moss MD;  Location:  HEART CARDIAC CATH LAB     INSERT VENTRICULAR ASSIST DEVICE LEFT (HEARTMATE II) N/A 2/18/2020    Procedure: INSERTION, LEFT VENTRICULAR ASSIST DEVICE (HEARTMATE III);  Surgeon: Mac Jaramillo MD;  Location:  OR       FAMILY HISTORY: No family history on file.    SOCIAL HISTORY:    Social History     Tobacco Use     Smoking status: Not on file   Substance Use Topics     Alcohol use: Not on file         OBJECTIVE:   1. VITAL SIGNS:   Temp:  [100  F (37.8  C)-100.8  F (38.2  C)] 100.8  F (38.2  C)  Heart Rate:  [] 96  Resp:  [18-22] 22  MAP:  [76 mmHg-106 mmHg] 104 mmHg  Arterial Line BP: ()/(57-94) 116/84  SpO2:  [94 %-100 %] 97 %    Ventilation Mode: (S) CPAP/PS  (Continuous positive airway pressure with Pressure Support)  FiO2 (%): 40 %  Rate Set (breaths/minute): 12 breaths/min  Tidal Volume Set (mL): 500 mL  PEEP (cm H2O): 5 cmH2O  Pressure Support (cm H2O): 7 cmH2O  Oxygen Concentration (%): 40 %  Resp: 22        2. INTAKE/ OUTPUT:   I/O last 3 completed shifts:  In: 2057.67 [P.O.:445; I.V.:1482.67; NG/GT:130]  Out: 3130 [Urine:2590; Chest Tube:540]    3. PHYSICAL EXAMINATION:   General: NAD, laying in hospital bed  Neuro: no focal deficit, follows commands in all 4 extremities  Resp: non labored breathing on NC  CV: RRR  Abdomen: Soft, Non-distended, Non-tender  Incisions: c/d/i  Extremities: warm and well perfused, 1+ edema    4. INVESTIGATIONS:   Arterial Blood Gases     Recent Labs   Lab 02/22/20  0333 02/21/20  1956 02/21/20  1520  02/20/20  1414  02/19/20  2335   WBC 13.9*  --  14.2*   < >  --    < > 7.9   HGB 9.0*  --  8.9*   < >  --    < > 9.1*     --  257   < >  --    < > 158   INR  --   --   --   --  1.26*  --  1.31*   * 147* 149*   < >  --    < >  --    POTASSIUM 3.7  --  3.6   < >  --    < > 4.0   BUN 35*  --  36*   < >  --    < > 32*   CR 1.65*  --  1.80*   < >  --    < > 1.71*    < > = values in this interval not displayed.           5. RADIOLOGY:     =========================================      Patient seen and examined. Investigations reviewed. Continue to slowly wean inotropes. Continue current anticoagulation plans. I agree with the findings outlined in the resident's note. I spent a total of 20 minutes examining the patient, reviewing  investigations and therapeutic counseling.

## 2020-02-22 NOTE — PHARMACY
Pharmacy Tube Feeding Consult    Medication reviewed for administration by feeding tube and for potential food/drug interactions.    Recommendation: No changes are needed at this time.     Pharmacy will continue to follow as new medications are ordered.    Eva Blanco, PatelD

## 2020-02-22 NOTE — PROGRESS NOTES
[facility]  General Infectious Disease Progress Note     Patient:  Eliseo Tanner, Date of birth 1955, Medical record number [id number]  Date of Visit:  February 16, 2020         Assessment and Recommendations:   Problem List:  1. Bacteremia with Proteus mirabilis and Enterococcus faecalis in 2/2 bottles on 2/13. Cultures from 2/14 are negative to date. Urine culture was negative on 2/13 making a urinary source unlikely. Lines were suspected as a source and exchanged. CT C/A/P from 2/8 without any obvious intra-abdominal source.   2. 1/2 Blood cultures on 2/15 growing Staph epi from left hand - likely a contaminate   3. Implant of HM3 left ventricular assist device (intracorporeal left ventricular assist device) - 2/18    Chest was re-opened during the procedure because of concern for bleed    Incisional wound vac in place   4. Vfib arrest  5. Chronic systolic heart failure with exacerbation. Plan for LVAD placement mid-week  6. Type 2 diabetes  7. CKD 3  8. Likely HIT undergoing plasmapheresis    Recommendations:  1) Continue Ampicillin 2 grams IV Q6H   2) Continue Ceftriaxone 2 grams IV BID   3) Agree with removal of lombardo catheter, arterial line, and CVC   4) Agree with repeat blood cultures today   5) Duration of abx therapy to be determine in the next 3-4 days based on clinical response     Discussion:   Mr Eliseo Tanner is a 65yo M with a history of chronic systolic heart failure, NICM, moderate CAD, HTN, DM2, CKDIII who was admitted to [facility] on 2/7/20 for evaluation of heart failure. Prior to arrival to the floor he was in Vfib and required shocks x 3. He was being diuresed as expected, and has been requiring inotropes and a balloon pump. On 2/13 morning had rigors and chills. Blood cultures (2/2) were positive for Proteus mirabilis and Enterococcus. I suspect this is most likely from his lines. Lines all removed and changed on 2/13 evening and balloon pump was also  exchanged. His blood cultures from 2/14 remain negative to date. He did have one of two blood cultures from the hand growing Staph epi on 2/15. I believe that this is a contaminate and not likely related to this current episode of bacteremia. Now s/p LVAD placement on 2/18.      [facility]   Infectious Diseases   [id number]        Interval History:   Tmax 100.8. Remains on dobutamine and epinephrine. Extubated. On 2L NC.   All other complete ROS negative          Current Antimicrobials   Vancomycin 2/13-2/16  Ampicillin 2/17-Present   Ceftriaxone 2/15-present  Meropenem 2/14-2/15  Tobramycin x 1 2/13  Pip/tazo 2/13-2/14         Physical Exam:   Ranges for vital signs:  Temp:  [100  F (37.8  C)-100.6  F (38.1  C)] 100.4  F (38  C)  Heart Rate:  [] 89  Resp:  [18-22] 22  MAP:  [76 mmHg-100 mmHg] 91 mmHg  Arterial Line BP: ()/(57-91) 114/73  SpO2:  [94 %-100 %] 99 %    Exam:  GENERAL: Extubated, awake, alert, no distress  ENT:  Head is normocephalic, atraumatic. place  EYES:  Eyes have anicteric sclerae. PERRL.   NECK:  Supple. No cervical lymphadenopathy  Chest: Wound vac in place, LVAD humm   LUNGS:  Clear  ABDOMEN:  Non-distended.   EXT: Extremities warm and without edema.  SKIN:  No acute rashes.   NEUROLOGIC:  Unable to fully assess at this time          Laboratory Data:     Creatinine   Date Value Ref Range Status   02/22/2020 1.65 (H) 0.66 - 1.25 mg/dL Final   02/21/2020 1.80 (H) 0.66 - 1.25 mg/dL Final   02/21/2020 1.80 (H) 0.66 - 1.25 mg/dL Final   02/20/2020 1.80 (H) 0.66 - 1.25 mg/dL Final   02/20/2020 1.73 (H) 0.66 - 1.25 mg/dL Final     WBC   Date Value Ref Range Status   02/22/2020 13.9 (H) 4.0 - 11.0 10e9/L Final   02/21/2020 14.2 (H) 4.0 - 11.0 10e9/L Final   02/21/2020 12.7 (H) 4.0 - 11.0 10e9/L Final   02/20/2020 12.5 (H) 4.0 - 11.0 10e9/L Final   02/20/2020 9.4 4.0 - 11.0 10e9/L Final     Hemoglobin   Date Value Ref Range Status   02/22/2020 9.0 (L) 13.3 - 17.7 g/dL Final     Platelet Count    Date Value Ref Range Status   02/22/2020 262 150 - 450 10e9/L Final     CRP Inflammation   Date Value Ref Range Status   02/08/2020 21.0 (H) 0.0 - 8.0 mg/L Final     AST   Date Value Ref Range Status   02/21/2020 79 (H) 0 - 45 U/L Final   02/19/2020 55 (H) 0 - 45 U/L Final   02/19/2020 46 (H) 0 - 45 U/L Final   02/18/2020 31 0 - 45 U/L Final   02/16/2020 37 0 - 45 U/L Final     ALT   Date Value Ref Range Status   02/21/2020 22 0 - 70 U/L Final   02/19/2020 19 0 - 70 U/L Final   02/19/2020 19 0 - 70 U/L Final   02/18/2020 24 0 - 70 U/L Final   02/16/2020 44 0 - 70 U/L Final     Bilirubin Total   Date Value Ref Range Status   02/21/2020 0.9 0.2 - 1.3 mg/dL Final   02/19/2020 1.5 (H) 0.2 - 1.3 mg/dL Final   02/19/2020 1.3 0.2 - 1.3 mg/dL Final   02/18/2020 0.4 0.2 - 1.3 mg/dL Final   02/16/2020 0.5 0.2 - 1.3 mg/dL Final     Lab Results   Component Value Date     (H) 02/22/2020    BUN 35 (H) 02/22/2020    CO2 31 02/22/2020     Microbiology:     Culture data:  2/16: Blood culture x 2 - NG  2/15: Blood culture 1 of 2 growing staph epi  2/14 Blood culture NGTD  2/13 Blood culture: Enterococcus faecalis, sensis pending; Proteus mirabilis, widely sensitive  2/13 Urine culture negative

## 2020-02-22 NOTE — PLAN OF CARE
PT 4E: Evaluation completed, treatment initiated.     Discharge Planner PT   Patient plan for discharge: local apt vs rehab  Current status: sit <> stand with assist x 2.  Patient able to perform 2 standing bouts with flexed posture.  Able to maintain standing 30 sec - 1 min with assist x 2.  VSS on 2 LPM via NC during session.   Barriers to return to prior living situation: assistance needed for safe mobility.   Recommendations for discharge: anticipate need for rehab stay.   Rationale for recommendations: dependence with functional mobility.        Entered by: Cliff Patten 02/22/2020 1:50 PM

## 2020-02-22 NOTE — PLAN OF CARE
Discharge Planner SLP   Patient plan for discharge: Unknown  Current status: Pt seen for dysphagia management. Recommend continuation of dysphagia 3 (chopped) diet/thin liquids; pt must take small, single sips. Encourage use of slight chin tuck. Pt must be fully alert and upright for all PO. SLP will follow.  Barriers to return to prior living situation: Medical condition, weakness, modified diet  Recommendations for discharge: Rehab  Rationale for recommendations: Pt will benefit from ongoing ST targeting swallow function       Entered by: Marlene Gardner 02/22/2020 4:01 PM

## 2020-02-23 ENCOUNTER — APPOINTMENT (OUTPATIENT)
Dept: GENERAL RADIOLOGY | Facility: CLINIC | Age: 65
DRG: 001 | End: 2020-02-23
Attending: STUDENT IN AN ORGANIZED HEALTH CARE EDUCATION/TRAINING PROGRAM
Payer: MEDICARE

## 2020-02-23 ENCOUNTER — APPOINTMENT (OUTPATIENT)
Dept: OCCUPATIONAL THERAPY | Facility: CLINIC | Age: 65
DRG: 001 | End: 2020-02-23
Attending: INTERNAL MEDICINE
Payer: MEDICARE

## 2020-02-23 LAB
ABO + RH BLD: NORMAL
ABO + RH BLD: NORMAL
ALBUMIN SERPL-MCNC: 2.1 G/DL (ref 3.4–5)
ALP SERPL-CCNC: 74 U/L (ref 40–150)
ALT SERPL W P-5'-P-CCNC: 21 U/L (ref 0–70)
ANION GAP SERPL CALCULATED.3IONS-SCNC: 2 MMOL/L (ref 3–14)
ANION GAP SERPL CALCULATED.3IONS-SCNC: 5 MMOL/L (ref 3–14)
APTT PPP: 51 SEC (ref 22–37)
AST SERPL W P-5'-P-CCNC: 38 U/L (ref 0–45)
BASE DEFICIT BLDV-SCNC: 0.9 MMOL/L
BASE EXCESS BLDA CALC-SCNC: 3 MMOL/L
BASE EXCESS BLDV CALC-SCNC: 1.6 MMOL/L
BASE EXCESS BLDV CALC-SCNC: 2.3 MMOL/L
BASE EXCESS BLDV CALC-SCNC: 6.3 MMOL/L
BILIRUB DIRECT SERPL-MCNC: 0.2 MG/DL (ref 0–0.2)
BILIRUB SERPL-MCNC: 0.4 MG/DL (ref 0.2–1.3)
BLD GP AB SCN SERPL QL: NORMAL
BLOOD BANK CMNT PATIENT-IMP: NORMAL
BUN SERPL-MCNC: 38 MG/DL (ref 7–30)
BUN SERPL-MCNC: 42 MG/DL (ref 7–30)
CALCIUM SERPL-MCNC: 7 MG/DL (ref 8.5–10.1)
CALCIUM SERPL-MCNC: 7.4 MG/DL (ref 8.5–10.1)
CHLORIDE SERPL-SCNC: 108 MMOL/L (ref 94–109)
CHLORIDE SERPL-SCNC: 111 MMOL/L (ref 94–109)
CO2 SERPL-SCNC: 27 MMOL/L (ref 20–32)
CO2 SERPL-SCNC: 31 MMOL/L (ref 20–32)
CREAT SERPL-MCNC: 1.38 MG/DL (ref 0.66–1.25)
CREAT SERPL-MCNC: 1.46 MG/DL (ref 0.66–1.25)
ERYTHROCYTE [DISTWIDTH] IN BLOOD BY AUTOMATED COUNT: 17.3 % (ref 10–15)
ERYTHROCYTE [DISTWIDTH] IN BLOOD BY AUTOMATED COUNT: 17.3 % (ref 10–15)
FACT X ACT/NOR PPP CHRO: 96 % (ref 70–130)
GFR SERPL CREATININE-BSD FRML MDRD: 49 ML/MIN/{1.73_M2}
GFR SERPL CREATININE-BSD FRML MDRD: 53 ML/MIN/{1.73_M2}
GLUCOSE BLDC GLUCOMTR-MCNC: 188 MG/DL (ref 70–99)
GLUCOSE BLDC GLUCOMTR-MCNC: 188 MG/DL (ref 70–99)
GLUCOSE BLDC GLUCOMTR-MCNC: 199 MG/DL (ref 70–99)
GLUCOSE BLDC GLUCOMTR-MCNC: 216 MG/DL (ref 70–99)
GLUCOSE SERPL-MCNC: 196 MG/DL (ref 70–99)
GLUCOSE SERPL-MCNC: 224 MG/DL (ref 70–99)
HCO3 BLD-SCNC: 27 MMOL/L (ref 21–28)
HCO3 BLDV-SCNC: 24 MMOL/L (ref 21–28)
HCO3 BLDV-SCNC: 27 MMOL/L (ref 21–28)
HCO3 BLDV-SCNC: 27 MMOL/L (ref 21–28)
HCO3 BLDV-SCNC: 32 MMOL/L (ref 21–28)
HCT VFR BLD AUTO: 30.2 % (ref 40–53)
HCT VFR BLD AUTO: 30.5 % (ref 40–53)
HGB BLD-MCNC: 9.2 G/DL (ref 13.3–17.7)
HGB BLD-MCNC: 9.5 G/DL (ref 13.3–17.7)
INR PPP: 1.49 (ref 0.86–1.14)
MAGNESIUM SERPL-MCNC: 1.8 MG/DL (ref 1.6–2.3)
MAGNESIUM SERPL-MCNC: 2.3 MG/DL (ref 1.6–2.3)
MCH RBC QN AUTO: 28.9 PG (ref 26.5–33)
MCH RBC QN AUTO: 29.1 PG (ref 26.5–33)
MCHC RBC AUTO-ENTMCNC: 30.5 G/DL (ref 31.5–36.5)
MCHC RBC AUTO-ENTMCNC: 31.1 G/DL (ref 31.5–36.5)
MCV RBC AUTO: 94 FL (ref 78–100)
MCV RBC AUTO: 95 FL (ref 78–100)
O2/TOTAL GAS SETTING VFR VENT: 21 %
OXYHGB MFR BLD: 96 % (ref 92–100)
OXYHGB MFR BLDV: 41 %
OXYHGB MFR BLDV: 45 %
OXYHGB MFR BLDV: 45 %
OXYHGB MFR BLDV: 54 %
PCO2 BLD: 37 MM HG (ref 35–45)
PCO2 BLDV: 38 MM HG (ref 40–50)
PCO2 BLDV: 43 MM HG (ref 40–50)
PCO2 BLDV: 43 MM HG (ref 40–50)
PCO2 BLDV: 49 MM HG (ref 40–50)
PH BLD: 7.47 PH (ref 7.35–7.45)
PH BLDV: 7.4 PH (ref 7.32–7.43)
PH BLDV: 7.41 PH (ref 7.32–7.43)
PH BLDV: 7.41 PH (ref 7.32–7.43)
PH BLDV: 7.42 PH (ref 7.32–7.43)
PHOSPHATE SERPL-MCNC: 1.9 MG/DL (ref 2.5–4.5)
PHOSPHATE SERPL-MCNC: 2.2 MG/DL (ref 2.5–4.5)
PLATELET # BLD AUTO: 274 10E9/L (ref 150–450)
PLATELET # BLD AUTO: 332 10E9/L (ref 150–450)
PO2 BLD: 81 MM HG (ref 80–105)
PO2 BLDV: 25 MM HG (ref 25–47)
PO2 BLDV: 27 MM HG (ref 25–47)
PO2 BLDV: 27 MM HG (ref 25–47)
PO2 BLDV: 31 MM HG (ref 25–47)
POTASSIUM SERPL-SCNC: 3.6 MMOL/L (ref 3.4–5.3)
POTASSIUM SERPL-SCNC: 3.8 MMOL/L (ref 3.4–5.3)
PROT SERPL-MCNC: 5.5 G/DL (ref 6.8–8.8)
RBC # BLD AUTO: 3.18 10E12/L (ref 4.4–5.9)
RBC # BLD AUTO: 3.26 10E12/L (ref 4.4–5.9)
SODIUM SERPL-SCNC: 140 MMOL/L (ref 133–144)
SODIUM SERPL-SCNC: 144 MMOL/L (ref 133–144)
SPECIMEN EXP DATE BLD: NORMAL
WBC # BLD AUTO: 15 10E9/L (ref 4–11)
WBC # BLD AUTO: 15.1 10E9/L (ref 4–11)

## 2020-02-23 PROCEDURE — 40000048 ZZH STATISTIC DAILY SWAN MONITORING

## 2020-02-23 PROCEDURE — 80048 BASIC METABOLIC PNL TOTAL CA: CPT | Performed by: INTERNAL MEDICINE

## 2020-02-23 PROCEDURE — 25000128 H RX IP 250 OP 636: Performed by: STUDENT IN AN ORGANIZED HEALTH CARE EDUCATION/TRAINING PROGRAM

## 2020-02-23 PROCEDURE — 97535 SELF CARE MNGMENT TRAINING: CPT | Mod: GO | Performed by: OCCUPATIONAL THERAPIST

## 2020-02-23 PROCEDURE — 85610 PROTHROMBIN TIME: CPT | Performed by: INTERNAL MEDICINE

## 2020-02-23 PROCEDURE — 86850 RBC ANTIBODY SCREEN: CPT | Performed by: THORACIC SURGERY (CARDIOTHORACIC VASCULAR SURGERY)

## 2020-02-23 PROCEDURE — C9113 INJ PANTOPRAZOLE SODIUM, VIA: HCPCS | Performed by: STUDENT IN AN ORGANIZED HEALTH CARE EDUCATION/TRAINING PROGRAM

## 2020-02-23 PROCEDURE — 83735 ASSAY OF MAGNESIUM: CPT | Performed by: INTERNAL MEDICINE

## 2020-02-23 PROCEDURE — 93005 ELECTROCARDIOGRAM TRACING: CPT

## 2020-02-23 PROCEDURE — 84100 ASSAY OF PHOSPHORUS: CPT | Performed by: INTERNAL MEDICINE

## 2020-02-23 PROCEDURE — 40000014 ZZH STATISTIC ARTERIAL MONITORING DAILY

## 2020-02-23 PROCEDURE — 99233 SBSQ HOSP IP/OBS HIGH 50: CPT | Mod: GC | Performed by: INTERNAL MEDICINE

## 2020-02-23 PROCEDURE — 86900 BLOOD TYPING SEROLOGIC ABO: CPT | Performed by: THORACIC SURGERY (CARDIOTHORACIC VASCULAR SURGERY)

## 2020-02-23 PROCEDURE — 25000132 ZZH RX MED GY IP 250 OP 250 PS 637: Mod: GY | Performed by: STUDENT IN AN ORGANIZED HEALTH CARE EDUCATION/TRAINING PROGRAM

## 2020-02-23 PROCEDURE — 25800030 ZZH RX IP 258 OP 636: Performed by: STUDENT IN AN ORGANIZED HEALTH CARE EDUCATION/TRAINING PROGRAM

## 2020-02-23 PROCEDURE — 97530 THERAPEUTIC ACTIVITIES: CPT | Mod: GO | Performed by: OCCUPATIONAL THERAPIST

## 2020-02-23 PROCEDURE — 25000125 ZZHC RX 250: Performed by: STUDENT IN AN ORGANIZED HEALTH CARE EDUCATION/TRAINING PROGRAM

## 2020-02-23 PROCEDURE — 27210437 ZZH NUTRITION PRODUCT SEMIELEM INTERMED LITER

## 2020-02-23 PROCEDURE — 82805 BLOOD GASES W/O2 SATURATION: CPT

## 2020-02-23 PROCEDURE — 86901 BLOOD TYPING SEROLOGIC RH(D): CPT | Performed by: THORACIC SURGERY (CARDIOTHORACIC VASCULAR SURGERY)

## 2020-02-23 PROCEDURE — 20000004 ZZH R&B ICU UMMC

## 2020-02-23 PROCEDURE — 71045 X-RAY EXAM CHEST 1 VIEW: CPT

## 2020-02-23 PROCEDURE — 40000196 ZZH STATISTIC RAPCV CVP MONITORING

## 2020-02-23 PROCEDURE — 80076 HEPATIC FUNCTION PANEL: CPT | Performed by: INTERNAL MEDICINE

## 2020-02-23 PROCEDURE — 85730 THROMBOPLASTIN TIME PARTIAL: CPT | Performed by: INTERNAL MEDICINE

## 2020-02-23 PROCEDURE — 82805 BLOOD GASES W/O2 SATURATION: CPT | Performed by: STUDENT IN AN ORGANIZED HEALTH CARE EDUCATION/TRAINING PROGRAM

## 2020-02-23 PROCEDURE — 93010 ELECTROCARDIOGRAM REPORT: CPT | Performed by: INTERNAL MEDICINE

## 2020-02-23 PROCEDURE — 85027 COMPLETE CBC AUTOMATED: CPT | Performed by: INTERNAL MEDICINE

## 2020-02-23 PROCEDURE — 25000132 ZZH RX MED GY IP 250 OP 250 PS 637: Mod: GY | Performed by: THORACIC SURGERY (CARDIOTHORACIC VASCULAR SURGERY)

## 2020-02-23 PROCEDURE — 00000146 ZZHCL STATISTIC GLUCOSE BY METER IP

## 2020-02-23 PROCEDURE — 85260 CLOT FACTOR X STUART-POWER: CPT | Performed by: INTERNAL MEDICINE

## 2020-02-23 PROCEDURE — 71045 X-RAY EXAM CHEST 1 VIEW: CPT | Mod: 77

## 2020-02-23 RX ORDER — QUETIAPINE FUMARATE 25 MG/1
25 TABLET, FILM COATED ORAL
Status: DISCONTINUED | OUTPATIENT
Start: 2020-02-23 | End: 2020-03-20 | Stop reason: HOSPADM

## 2020-02-23 RX ORDER — FUROSEMIDE 10 MG/ML
80 INJECTION INTRAMUSCULAR; INTRAVENOUS ONCE
Status: COMPLETED | OUTPATIENT
Start: 2020-02-23 | End: 2020-02-23

## 2020-02-23 RX ORDER — WARFARIN SODIUM 3 MG/1
3 TABLET ORAL
Status: COMPLETED | OUTPATIENT
Start: 2020-02-23 | End: 2020-02-23

## 2020-02-23 RX ORDER — HYDRALAZINE HYDROCHLORIDE 25 MG/1
25 TABLET, FILM COATED ORAL EVERY 8 HOURS SCHEDULED
Status: DISCONTINUED | OUTPATIENT
Start: 2020-02-23 | End: 2020-02-24

## 2020-02-23 RX ORDER — AMIODARONE HYDROCHLORIDE 200 MG/1
400 TABLET ORAL DAILY
Status: COMPLETED | OUTPATIENT
Start: 2020-02-23 | End: 2020-03-07

## 2020-02-23 RX ORDER — AMIODARONE HYDROCHLORIDE 200 MG/1
200 TABLET ORAL DAILY
Status: DISCONTINUED | OUTPATIENT
Start: 2020-03-08 | End: 2020-03-20 | Stop reason: HOSPADM

## 2020-02-23 RX ORDER — METHOCARBAMOL 500 MG/1
500 TABLET, FILM COATED ORAL 4 TIMES DAILY
Status: DISCONTINUED | OUTPATIENT
Start: 2020-02-23 | End: 2020-02-25

## 2020-02-23 RX ADMIN — Medication 100 MG: at 08:16

## 2020-02-23 RX ADMIN — AMPICILLIN SODIUM 2 G: 2 INJECTION, POWDER, FOR SOLUTION INTRAMUSCULAR; INTRAVENOUS at 08:17

## 2020-02-23 RX ADMIN — POTASSIUM CHLORIDE 20 MEQ: 29.8 INJECTION, SOLUTION INTRAVENOUS at 16:43

## 2020-02-23 RX ADMIN — ASPIRIN 81 MG CHEWABLE TABLET 81 MG: 81 TABLET CHEWABLE at 08:16

## 2020-02-23 RX ADMIN — ALLOPURINOL 200 MG: 100 TABLET ORAL at 08:16

## 2020-02-23 RX ADMIN — DICLOFENAC 4 G: 10 GEL TOPICAL at 08:17

## 2020-02-23 RX ADMIN — AMPICILLIN SODIUM 2 G: 2 INJECTION, POWDER, FOR SOLUTION INTRAMUSCULAR; INTRAVENOUS at 19:35

## 2020-02-23 RX ADMIN — POTASSIUM PHOSPHATE, MONOBASIC AND POTASSIUM PHOSPHATE, DIBASIC 20 MMOL: 224; 236 INJECTION, SOLUTION INTRAVENOUS at 18:50

## 2020-02-23 RX ADMIN — WARFARIN SODIUM 3 MG: 3 TABLET ORAL at 17:57

## 2020-02-23 RX ADMIN — PANTOPRAZOLE SODIUM 40 MG: 40 INJECTION, POWDER, FOR SOLUTION INTRAVENOUS at 08:16

## 2020-02-23 RX ADMIN — FUROSEMIDE 80 MG: 10 INJECTION, SOLUTION INTRAVENOUS at 04:52

## 2020-02-23 RX ADMIN — FLUOXETINE HYDROCHLORIDE 20 MG: 20 LIQUID ORAL at 08:17

## 2020-02-23 RX ADMIN — POTASSIUM CHLORIDE 20 MEQ: 29.8 INJECTION, SOLUTION INTRAVENOUS at 04:22

## 2020-02-23 RX ADMIN — AMPICILLIN SODIUM 2 G: 2 INJECTION, POWDER, FOR SOLUTION INTRAMUSCULAR; INTRAVENOUS at 14:38

## 2020-02-23 RX ADMIN — FUROSEMIDE 80 MG: 10 INJECTION, SOLUTION INTRAVENOUS at 11:46

## 2020-02-23 RX ADMIN — METHOCARBAMOL 500 MG: 500 TABLET, FILM COATED ORAL at 19:36

## 2020-02-23 RX ADMIN — AMIODARONE HYDROCHLORIDE 400 MG: 200 TABLET ORAL at 14:37

## 2020-02-23 RX ADMIN — ACETAMINOPHEN 650 MG: 325 TABLET, FILM COATED ORAL at 03:53

## 2020-02-23 RX ADMIN — GABAPENTIN 100 MG: 100 CAPSULE ORAL at 14:37

## 2020-02-23 RX ADMIN — ATORVASTATIN CALCIUM 20 MG: 20 TABLET, FILM COATED ORAL at 19:36

## 2020-02-23 RX ADMIN — GABAPENTIN 100 MG: 100 CAPSULE ORAL at 19:36

## 2020-02-23 RX ADMIN — MAGNESIUM SULFATE 2 G: 2 INJECTION INTRAVENOUS at 17:59

## 2020-02-23 RX ADMIN — HYDRALAZINE HYDROCHLORIDE 25 MG: 25 TABLET, FILM COATED ORAL at 22:35

## 2020-02-23 RX ADMIN — BIVALIRUDIN 0.02 MG/KG/HR: 250 INJECTION, POWDER, LYOPHILIZED, FOR SOLUTION INTRAVENOUS at 17:34

## 2020-02-23 RX ADMIN — CEFTRIAXONE 2 G: 2 INJECTION, POWDER, FOR SOLUTION INTRAMUSCULAR; INTRAVENOUS at 22:35

## 2020-02-23 RX ADMIN — AMPICILLIN SODIUM 2 G: 2 INJECTION, POWDER, FOR SOLUTION INTRAMUSCULAR; INTRAVENOUS at 01:56

## 2020-02-23 RX ADMIN — Medication 200 MG: at 08:15

## 2020-02-23 RX ADMIN — MAGNESIUM OXIDE 400 MG: 400 TABLET ORAL at 08:16

## 2020-02-23 RX ADMIN — HYDRALAZINE HYDROCHLORIDE 25 MG: 25 TABLET, FILM COATED ORAL at 14:37

## 2020-02-23 RX ADMIN — MELATONIN TAB 3 MG 3 MG: 3 TAB at 22:35

## 2020-02-23 RX ADMIN — METHOCARBAMOL 500 MG: 500 TABLET, FILM COATED ORAL at 10:43

## 2020-02-23 RX ADMIN — ACETAMINOPHEN 650 MG: 325 TABLET, FILM COATED ORAL at 22:40

## 2020-02-23 RX ADMIN — CEFTRIAXONE 2 G: 2 INJECTION, POWDER, FOR SOLUTION INTRAMUSCULAR; INTRAVENOUS at 09:50

## 2020-02-23 RX ADMIN — MULTIVITAMIN 15 ML: LIQUID ORAL at 08:16

## 2020-02-23 RX ADMIN — GABAPENTIN 100 MG: 100 CAPSULE ORAL at 08:16

## 2020-02-23 RX ADMIN — METHOCARBAMOL 500 MG: 500 TABLET, FILM COATED ORAL at 15:31

## 2020-02-23 RX ADMIN — POTASSIUM PHOSPHATE, MONOBASIC AND POTASSIUM PHOSPHATE, DIBASIC 15 MMOL: 224; 236 INJECTION, SOLUTION INTRAVENOUS at 06:56

## 2020-02-23 ASSESSMENT — PAIN DESCRIPTION - DESCRIPTORS
DESCRIPTORS: ACHING
DESCRIPTORS: DISCOMFORT

## 2020-02-23 ASSESSMENT — MIFFLIN-ST. JEOR: SCORE: 1750.63

## 2020-02-23 ASSESSMENT — ACTIVITIES OF DAILY LIVING (ADL)
ADLS_ACUITY_SCORE: 18
ADLS_ACUITY_SCORE: 16
ADLS_ACUITY_SCORE: 19
ADLS_ACUITY_SCORE: 19

## 2020-02-23 NOTE — PROGRESS NOTES
Advanced Heart Failure Consult Progress Note  Eliseo Tanner MRN: [id number]  Age: 64 year old, : 1955  Date: 2020              Assessment and Plan:     Mr Eliseo Tanner is a 63yo M w/PMHx notable for chronic systolic heart failure, NICM, moderate CAD, HTN, ABHINAV on CPAP, DM2, CKDIII who is transferred to [facility] for evaluation and management of advanced heart failure with arrhythmia now s/p HM 3 on .    Today's plan ()  -Keep dobutamine at 2.5  -Keep SG catheter, hemodynamics Q6   -Increased speed to 5400 on LVAD   -Lasix 80 mg IV BID, will monitor UO and give extra dose later if needed   -Start hydralazine 25mg po TID     Moderate CAD  Severe Mitral regurgitation  Chronic nonischemic systolic heart failure w/ reduced EF (15-20%) and exacerbation  NYHA Class III. Echo 2020: LVEF 15-20%; LVEDd 5.9 cm; 4+ MR. [facility] 2019 w/ nonobstructive CAD w/ severe branch disease. Presented with increased wt gain, SOB, LE edema concerning for HF exacerbation, initially concerning for cardiogenic shock. Admitted to [facility] for further evaluation regarding need for advanced HF therapies. Patient is now s/p HM III placement on  with early post-op course complicated by bleeding that is slowing down as of  AM.   -Trend Hbg q6h and transfuse for Hgb <7.0  -Monitor fibrinogen, INR, platelets  -Continue ASA 81, atorvastatin 20 mg  -Continue dobutamine 2.5mcg/kg/minute given MVO2 trending down, will plan on weaning tomorrow pending labs and hemodynamics.   -Keep HD internal jugular catheter, hemodynamics Q6  -Continue coumadin with goal INR 2-3   -Increased speed to 5400 on LVAD in AM  -Lasix 80 mg IV BID  -Start hydralazine 25mg po TID     Proteus and Enterococcus bacteremia  Organisms growing from 2/2 blood cultures from . Blood cultured from  NGTD and 2/15 growing 1/2 S.epi, likely contaminant per ID.  ID consulted, appreciate help. Will decide on final duration of  "abx.  -daily blood cultures until negative  -Zosyn (2/13-2/14)  -Tobramycin (2/13-2/14)  -Vancomycin (2/13-2/17  -Meropenem (2/14-2/15)  -Ceftriaxone (2/16-  -Ampicillin (2/16-    #Thrombocytopenia:  Concern for HIT given timing with respect to heparin exposure. HIT positive DAVINA negative. Antibody is now negative x2, thus no further indication for PLAX  -Heme consulted  -Bivalirudin gtt after LVAD surgery,goal chromogenic level 20-40     VFib requiring shock  Shocked x 3 prior to transfer, loaded with amio. No known prior history. Suspect related to underlying HF.   -Keep K>4, Mg>2  -Amio 400 mg PO QD  -Need ICD for secondary prevention      CODE: FULL CODE     Alina James  Cardiology PGY4              Subjective/Interval Events     No acute overnight events. This AM, patient denying pain, SOB, CP, lightheadedness although reported that he has been having difficulty sleeping           Objective     /59   Pulse 84   Temp 98.6  F (37  C) (Pulmonary Artery)   Resp 9   Ht 1.702 m (5' 7\")   Wt 101.2 kg (223 lb 1.7 oz)   SpO2 95%   BMI 34.94 kg/m    Temp:  [98.4  F (36.9  C)-100.8  F (38.2  C)] 98.6  F (37  C)  Pulse:  [80-98] 84  Heart Rate:  [79-99] 84  Resp:  [8-25] 9  BP: ()/(51-99) 106/59  MAP:  [67 mmHg-110 mmHg] 91 mmHg  Arterial Line BP: ()/(49-98) 101/81  SpO2:  [91 %-100 %] 95 %  Wt Readings from Last 2 Encounters:   02/23/20 101.2 kg (223 lb 1.7 oz)     I/O last 3 completed shifts:  In: 3494.53 [P.O.:1650; I.V.:1394.53; NG/GT:270]  Out: 2420 [Urine:1510; Chest Tube:910]      Gen: No acute distress  HEENT: NC/AT, +JVD   PULM/THORAX: CTAB  CV: normal rate, regular rhythm, normal S1 and S2, LVAD hum  ABD: Soft, NTND, bowel sounds present, no masses  EXT: WWP. Trace LE edema, clubbing or cyanosis.  NEURO: A&Ox3                Data:     Recent Results (from the past 24 hour(s))   Glucose by meter    Collection Time: 02/22/20  8:05 AM   Result Value Ref Range    Glucose 155 (H) 70 - 99 " mg/dL   Blood culture    Collection Time: 02/22/20  8:19 AM   Result Value Ref Range    Specimen Description Blood Right Hand     Culture Micro No growth after 17 hours    Blood culture    Collection Time: 02/22/20  8:23 AM   Result Value Ref Range    Specimen Description Blood Right Hand     Culture Micro No growth after 17 hours    UA with Microscopic reflex to Culture    Collection Time: 02/22/20  9:44 AM   Result Value Ref Range    Color Urine Yellow     Appearance Urine Slightly Cloudy     Glucose Urine Negative NEG^Negative mg/dL    Bilirubin Urine Negative NEG^Negative    Ketones Urine Negative NEG^Negative mg/dL    Specific Gravity Urine 1.022 1.003 - 1.035    Blood Urine Small (A) NEG^Negative    pH Urine 5.5 5.0 - 7.0 pH    Protein Albumin Urine 100 (A) NEG^Negative mg/dL    Urobilinogen mg/dL Normal 0.0 - 2.0 mg/dL    Nitrite Urine Negative NEG^Negative    Leukocyte Esterase Urine Small (A) NEG^Negative    Source Catheterized Urine     WBC Urine 2 0 - 5 /HPF    RBC Urine 4 (H) 0 - 2 /HPF    Transitional Epi <1 0 - 1 /HPF    Mucous Urine Present (A) NEG^Negative /LPF   Factor 10 chromogenic    Collection Time: 02/22/20 11:26 AM   Result Value Ref Range    Chromogenic Factor 10 116 70 - 130 %   Glucose by meter    Collection Time: 02/22/20 11:29 AM   Result Value Ref Range    Glucose 215 (H) 70 - 99 mg/dL   Basic metabolic panel    Collection Time: 02/22/20  2:51 PM   Result Value Ref Range    Sodium 144 133 - 144 mmol/L    Potassium 3.6 3.4 - 5.3 mmol/L    Chloride 111 (H) 94 - 109 mmol/L    Carbon Dioxide 32 20 - 32 mmol/L    Anion Gap 2 (L) 3 - 14 mmol/L    Glucose 239 (H) 70 - 99 mg/dL    Urea Nitrogen 35 (H) 7 - 30 mg/dL    Creatinine 1.52 (H) 0.66 - 1.25 mg/dL    GFR Estimate 47 (L) >60 mL/min/[1.73_m2]    GFR Estimate If Black 54 (L) >60 mL/min/[1.73_m2]    Calcium 7.6 (L) 8.5 - 10.1 mg/dL   CBC with platelets    Collection Time: 02/22/20  2:51 PM   Result Value Ref Range    WBC 15.1 (H) 4.0 - 11.0  10e9/L    RBC Count 3.18 (L) 4.4 - 5.9 10e12/L    Hemoglobin 9.2 (L) 13.3 - 17.7 g/dL    Hematocrit 30.4 (L) 40.0 - 53.0 %    MCV 96 78 - 100 fl    MCH 28.9 26.5 - 33.0 pg    MCHC 30.3 (L) 31.5 - 36.5 g/dL    RDW 17.2 (H) 10.0 - 15.0 %    Platelet Count 293 150 - 450 10e9/L   INR    Collection Time: 02/22/20  2:51 PM   Result Value Ref Range    INR 1.57 (H) 0.86 - 1.14   Blood gas venous with oxyhemoglobin (PCU Collect TBD)    Collection Time: 02/22/20  2:51 PM   Result Value Ref Range    Ph Venous 7.41 7.32 - 7.43 pH    PCO2 Venous 51 (H) 40 - 50 mm Hg    PO2 Venous 28 25 - 47 mm Hg    Bicarbonate Venous 32 (H) 21 - 28 mmol/L    FIO2 2L     Oxyhemoglobin Venous 48 %    Base Excess Venous 6.1 mmol/L   Glucose by meter    Collection Time: 02/22/20  2:52 PM   Result Value Ref Range    Glucose 224 (H) 70 - 99 mg/dL   Glucose by meter    Collection Time: 02/22/20  5:14 PM   Result Value Ref Range    Glucose 181 (H) 70 - 99 mg/dL   Blood gas venous with oxyhemoglobin (PCU Collect TBD)    Collection Time: 02/22/20  5:16 PM   Result Value Ref Range    Ph Venous 7.41 7.32 - 7.43 pH    PCO2 Venous 45 40 - 50 mm Hg    PO2 Venous 23 (L) 25 - 47 mm Hg    Bicarbonate Venous 29 (H) 21 - 28 mmol/L    FIO2 21     Oxyhemoglobin Venous 35 %    Base Excess Venous 3.9 mmol/L   Blood gas venous with oxyhemoglobin (PCU Collect TBD)    Collection Time: 02/22/20  7:59 PM   Result Value Ref Range    Ph Venous 7.42 7.32 - 7.43 pH    PCO2 Venous 48 40 - 50 mm Hg    PO2 Venous 31 25 - 47 mm Hg    Bicarbonate Venous 32 (H) 21 - 28 mmol/L    FIO2 21     Oxyhemoglobin Venous 54 %    Base Excess Venous 6.3 mmol/L   Magnesium    Collection Time: 02/22/20  7:59 PM   Result Value Ref Range    Magnesium 2.0 1.6 - 2.3 mg/dL   Phosphorus    Collection Time: 02/22/20  7:59 PM   Result Value Ref Range    Phosphorus 2.1 (L) 2.5 - 4.5 mg/dL   Potassium    Collection Time: 02/22/20  7:59 PM   Result Value Ref Range    Potassium 3.6 3.4 - 5.3 mmol/L   Glucose  by meter    Collection Time: 02/22/20  9:54 PM   Result Value Ref Range    Glucose 135 (H) 70 - 99 mg/dL   Basic metabolic panel    Collection Time: 02/23/20  3:33 AM   Result Value Ref Range    Sodium 144 133 - 144 mmol/L    Potassium 3.8 3.4 - 5.3 mmol/L    Chloride 111 (H) 94 - 109 mmol/L    Carbon Dioxide 31 20 - 32 mmol/L    Anion Gap 2 (L) 3 - 14 mmol/L    Glucose 196 (H) 70 - 99 mg/dL    Urea Nitrogen 38 (H) 7 - 30 mg/dL    Creatinine 1.46 (H) 0.66 - 1.25 mg/dL    GFR Estimate 49 (L) >60 mL/min/[1.73_m2]    GFR Estimate If Black 57 (L) >60 mL/min/[1.73_m2]    Calcium 7.4 (L) 8.5 - 10.1 mg/dL   Partial thromboplastin time    Collection Time: 02/23/20  3:33 AM   Result Value Ref Range    PTT 51 (H) 22 - 37 sec   CBC with platelets    Collection Time: 02/23/20  3:33 AM   Result Value Ref Range    WBC 15.1 (H) 4.0 - 11.0 10e9/L    RBC Count 3.18 (L) 4.4 - 5.9 10e12/L    Hemoglobin 9.2 (L) 13.3 - 17.7 g/dL    Hematocrit 30.2 (L) 40.0 - 53.0 %    MCV 95 78 - 100 fl    MCH 28.9 26.5 - 33.0 pg    MCHC 30.5 (L) 31.5 - 36.5 g/dL    RDW 17.3 (H) 10.0 - 15.0 %    Platelet Count 274 150 - 450 10e9/L   INR    Collection Time: 02/23/20  3:33 AM   Result Value Ref Range    INR 1.49 (H) 0.86 - 1.14   Phosphorus    Collection Time: 02/23/20  3:33 AM   Result Value Ref Range    Phosphorus 2.2 (L) 2.5 - 4.5 mg/dL   Magnesium    Collection Time: 02/23/20  3:33 AM   Result Value Ref Range    Magnesium 2.3 1.6 - 2.3 mg/dL   Type and Screen (AM Draw)    Collection Time: 02/23/20  3:33 AM   Result Value Ref Range    ABO A     RH(D) Pos     Antibody Screen Neg     Test Valid Only At          [facility]    Specimen Expires 02/26/2020    Blood gas venous and oxyhgb    Collection Time: 02/23/20  3:33 AM   Result Value Ref Range    Ph Venous 7.42 7.32 - 7.43 pH    PCO2 Venous 49 40 - 50 mm Hg    PO2 Venous 27 25 - 47 mm Hg    Bicarbonate Venous 32 (H) 21 - 28 mmol/L    FIO2 21.0      Oxyhemoglobin Venous 45 %    Base Excess Venous 6.3 mmol/L       Recent Results (from the past 24 hour(s))   Echocardiogram Complete    Narrative    [id number]  [id number]  [id number]  [id number]^MATT^NAHUN^JIM           [facility],[place]  Echocardiography Laboratory  [address] [place] [id number]     Name: WOLFGANG LEACH  MRN: [id number]  : 1955  Study Date: 2020 10:06 AM  Age: 64 yrs  Gender: Male  Patient Location: [facility]  Reason For Study: Heart Failure  Ordering Physician: NAHUN GUTIERREZ  Referring Physician: SELF, REFERRED  Performed By: Yvonne Adan RDCS     BSA: 2.2 m2  Height: 67 in  Weight: 244 lb  HR: 103  BP: 72/52 mmHg  _____________________________________________________________________________  __        Procedure  Complete Portable Echo Adult. Contrast Optison. Optison (NDC #5177-6880-32)  given intravenously. Patient was given 6 ml mixture of 3 ml Optison and 6 ml  saline. 3 ml wasted.  _____________________________________________________________________________  __        Interpretation Summary  Left ventricular size is normal.  LVEF 20% based on biplane 2D tracing.  Mild to moderate right ventricular dilation is present.  Global right ventricular function is moderately reduced.  Pulmonary artery systolic pressure is normal.  Dilation of the inferior vena cava is present with abnormal respiratory  variation in diameter.  _____________________________________________________________________________  __        Left Ventricle  Left ventricular size is normal. LVEF 20% based on biplane 2D tracing. Left  ventricular wall thickness is normal. Diastolic function not assessed due to  frequent ectopy. Abnormal septal motion consistent with left bundle branch  block is present.     Right Ventricle  Mild to moderate right ventricular dilation is present. Global right  ventricular function is moderately reduced.     Atria  Mild biatrial enlargement is  present.     Mitral Valve  Moderate mitral insufficiency is present.        Aortic Valve  Aortic valve is normal in structure and function.     Tricuspid Valve  Moderate tricuspid insufficiency is present. The right ventricular systolic  pressure is approximated at 24.4 mmHg plus the right atrial pressure.  Pulmonary artery systolic pressure is normal.     Pulmonic Valve  The pulmonic valve cannot be assessed. Mild pulmonic insufficiency is present.     Vessels  The aorta root is normal. The pulmonary artery cannot be assessed. Dilation of  the inferior vena cava is present with abnormal respiratory variation in  diameter.     Compared to Previous Study  Previous study not available for comparison.     _____________________________________________________________________________  __  MMode/2D Measurements & Calculations  IVSd: 0.61 cm  LVIDd: 4.7 cm  LVIDs: 3.7 cm  LVPWd: 0.75 cm  FS: 21.7 %  LV mass(C)d: 99.1 grams  LV mass(C)dI: 45.0 grams/m2     Ao root diam: 2.9 cm  asc Aorta Diam: 2.5 cm  LVOT diam: 1.9 cm  LVOT area: 2.8 cm2     EF(MOD-bp): 21.5 %  LA Volume (BP): 84.8 ml  LA Volume Index (BP): 38.5 ml/m2  RWT: 0.32        Doppler Measurements & Calculations  PA acc time: 0.09 sec     PI end-d saumya: 154.0 cm/sec  TR max saumya: 247.0 cm/sec  TR max P.4 mmHg     _____________________________________________________________________________  __           Report approved by: Graciela Tomlinson 2020 12:05 PM      XR Chest Port 1 View    Narrative    XR CHEST PORT 1 VW  2020 4:21 PM      HISTORY: SG Placement    COMPARISON: None available    FINDINGS: Single AP chest radiograph. [place]-Chucky catheter tip is in the  proximal right main pulmonary artery. Right central line tip and right  upper extremity PICC line tip at the lower SVC. Trachea is midline,  cardiomegaly. Bilateral perihilar streaky opacities. Mild increased  interstitial opacities in the right lung with ill-defined pulmonary  vascularity. No  pneumothorax or pleural effusion. No acute osseous or  abdominal abnormality. Elevated left hemidiaphragm.      Impression    IMPRESSION:  1. [place]-Chucky catheter tip at the proximal right main pulmonary artery.  2. Cardiomegaly with mild pulmonary edema, especially on the right.    I have personally reviewed the examination and initial interpretation  and I agree with the findings.    DONG SOLORIO MD   Cardiac Catheterization    Narrative      Successful insertion of a IABP in the RFA                Medications     Current Facility-Administered Medications   Medication     acetaminophen (TYLENOL) tablet 650 mg     albuterol (PROAIR HFA/PROVENTIL HFA/VENTOLIN HFA) 108 (90 Base) MCG/ACT inhaler 2 puff     allopurinol (ZYLOPRIM) tablet 200 mg     amiodarone (NEXTERONE) 1,500 mg in D5W 250 mL infusion     ampicillin (OMNIPEN) 2 g vial to attach to  ml bag     aspirin (ASA) chewable tablet 81 mg     atorvastatin (LIPITOR) tablet 20 mg     bisacodyl (DULCOLAX) EC tablet 5 mg    Or     bisacodyl (DULCOLAX) EC tablet 10 mg    Or     bisacodyl (DULCOLAX) EC tablet 15 mg     bivalirudin (ANGIOMAX) 250 mg in sodium chloride 0.9 % 250 mL ANTICOAGULANT infusion     calcium carbonate (TUMS) chewable tablet 500 mg     cefTRIAXone (ROCEPHIN) 2 g vial to attach to  ml bag for ADULTS or NS 50 ml bag for PEDS     co-enzyme Q-10 capsule 200 mg     dextrose 10% infusion     dextrose 10% infusion     glucose gel 15-30 g    Or     dextrose 50 % injection 25-50 mL    Or     glucagon injection 1 mg     diclofenac (VOLTAREN) 1 % topical gel 4 g     DOBUTamine (DOBUTREX) 1,000 mg in D5W 250 mL infusion (adult max conc)     EPINEPHrine (ADRENALIN) 5 mg in sodium chloride 0.9 % 250 mL infusion     fentaNYL (PF) (SUBLIMAZE) injection 25 mcg     FLUoxetine (PROzac) solution 20 mg     gabapentin (NEURONTIN) capsule 100 mg     hydrALAZINE (APRESOLINE) injection 10 mg     insulin aspart (NovoLOG) injection (RAPID ACTING)      insulin aspart (NovoLOG) injection (RAPID ACTING)     insulin aspart (NovoLOG) injection (RAPID ACTING)     lidocaine (LMX4) cream     lidocaine (PF) (XYLOCAINE) 1 % injection 10 mL     lidocaine 1 % 0.1-1 mL     magnesium oxide (MAG-OX) tablet 400 mg     magnesium sulfate 2 g in water intermittent infusion     magnesium sulfate 4 g in 100 mL sterile water (premade)     medication instruction     melatonin tablet 3 mg     meperidine (DEMEROL) injection 12.5-25 mg     multivitamins w/minerals (CERTAVITE) liquid 15 mL     naloxone (NARCAN) injection 0.1-0.4 mg     pantoprazole (PROTONIX) 40 mg IV push injection     polyethylene glycol (MIRALAX) Packet 17 g     potassium chloride (KLOR-CON) Packet 20-40 mEq     potassium chloride 10 mEq in 100 mL intermittent infusion with 10 mg lidocaine     potassium chloride 10 mEq in 100 mL sterile water intermittent infusion (premix)     potassium chloride 20 mEq in 50 mL intermittent infusion     potassium chloride ER (KLOR-CON M) CR tablet 20-40 mEq     potassium phosphate 10 mmol in D5W 250 mL intermittent infusion     potassium phosphate 15 mmol in D5W 250 mL intermittent infusion     potassium phosphate 20 mmol in D5W 250 mL intermittent infusion     potassium phosphate 20 mmol in D5W 500 mL intermittent infusion     potassium phosphate 25 mmol in D5W 500 mL intermittent infusion     Reason beta blocker order not selected     senna-docusate (SENOKOT-S/PERICOLACE) 8.6-50 MG per tablet 1 tablet    Or     senna-docusate (SENOKOT-S/PERICOLACE) 8.6-50 MG per tablet 2 tablet     sodium chloride (PF) 0.9% PF flush 3 mL     sodium chloride (PF) 0.9% PF flush 3 mL     vitamin B1 (THIAMINE) tablet 100 mg     Warfarin Therapy Reminder (Check START DATE - warfarin may be starting in the FUTURE)

## 2020-02-23 NOTE — PLAN OF CARE
Major Shift Events:  Pt feeling well overall today. Pt stood and pivoted to chair with therapy, tolerated fair. Hemodynamics monitored and meds adjusted. Amio gtt discontinued and PO amio given, pt converted back to a fib this afternoon after drip was stopped. Electrolytes replaced. Tube feedings increased, not yet to goal. Pt eating all meals and has good appetite.  Plan: Continue to monitor hemodynamics overnight. Possible d c of swan and a line tomorrow. Continue plan of care.  For vital signs and complete assessments, please see documentation flowsheets.

## 2020-02-23 NOTE — PLAN OF CARE
Major Shift Events:  Dobutamine drip decreased today, epi turned off. Hemodynamics monitored closely. Meds and therapy adjusted to hemodynamics. Pt got up to chair and worked with therapy, tolerated fair. Warfarin therapy started this evening. LVAD numbers stable overall, at times PI >7. Lasix given x1. Guevaar discontinued this evening.  Plan: Continue to monitor hemodynamics and wean drips as able. Continue plan of care.  For vital signs and complete assessments, please see documentation flowsheets.

## 2020-02-23 NOTE — PLAN OF CARE
Neuro: Pt A&Ox4. Moves all extremities. Pt c/o chest discomfort - PRN Tylenol given x1. Pt awake all night.    Cardiac: SR. HR 80s. MAPs mid 80s-90s. LVAD numbers without alarms - see flowsheet. Ficks completed at 2000 and 0400 - see flowsheet; 2000 CVP 16 and 0400 CVP 18 - MD notified, Lasix 80mg ordered and given.    Respiratory: On RA. Pt wore BIPAP for ~1hr. Lung sounds clear/diminished.    GI/: Loose, stools x3. Pt voiding post lombardo removal in urinal. 375ml of urine output response to lasix.    Incisons: Chest incision wound vac CDI. Chest tubes x5 with 5-40ml/hr of serosanguinous output present.    Lines: R IJ with swan. PIVx3. R radial arterial line.     No family present at bedside overnight.   Continue to monitor and update MD as needed. Continue plan of care.       Problem: Adult Inpatient Plan of Care  Goal: Plan of Care Review  2/23/2020 0625 by Gail White, RN  Outcome: No Change

## 2020-02-23 NOTE — PLAN OF CARE
Discharge Planner OT   Patient plan for discharge: TCU  Current status: pt mod assist pivot to chair from EOB after max assist supine to EOB.   Barriers to return to prior living situation: post surgical precautions and deconditioning.   Recommendations for discharge: TCU  Rationale for recommendations: pt below baseline in ADL I.        Entered by: Betito Day 02/23/2020 2:56 PM

## 2020-02-23 NOTE — PROGRESS NOTES
CV ICU Progress Note        CO-MORBIDITIES:   Patient Active Problem List   Diagnosis     Acute on chronic systolic congestive heart failure (H)     Anxiety     CKD (chronic kidney disease) stage 3, GFR 30-59 ml/min (H)     Coronary artery disease involving native coronary artery of native heart without angina pectoris     GERD (gastroesophageal reflux disease)     Gout     Hyperlipidemia with target LDL less than 100     Nonischemic cardiomyopathy (H)     Non-nephrotic range proteinuria     ABHINAV on CPAP     Type 2 diabetes mellitus with stage 3 chronic kidney disease, without long-term current use of insulin (H)     Heart failure (H)     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 Redd on 2/18 for chronic systolic heart failure 2/2 NICM. Extubated 2/21.    PLAN FOR TODAY:  -Transitioned to oral amiodarone   -Diuresis lasix 80 x 2 doses  -Started hydralazine 25 mg TID  -Keep dobutamine     PLAN:  Neuro/ pain/ sedation:  - Monitor neurological status. Notify the MD for any acute changes in exam.  -dilaudid, tylenol, gabapentin, robaxin   - continue home fluoxetine    Pulmonary care:   #COPD  - extubated 2/21  - ABHINAV on CPAP, CPAP overnight ordered    Cardiovascular:    #Moderate CAD ([facility] 11/2019 nonobstructive CAD w/ severe branch disease)  #CHF EF 15-20% s/p LVAD placement  #Vfib this admission, s/p amio load  - Monitor hemodynamic status  - dobutamine gtt for MAP >65, keep dobutamine per Cards  - Hold PTA entresto, spironolactone  - continue ASA 81 mg, atorvastatin  - continue amio 400 mg BID  - Jeramy weaned 2/21  - Start hydralazine 25 mg TID    GI care:   - dysphagia 3 diet  - NJ for TF, will remove today if eating goes well    Fluids/ Electrolytes/ Nutrition:   - TKO for IV fluid hydration  - No indication for parenteral nutrition    Renal/ Fluid Balance:    #CKD  III  - Urine output is adequate so far.  - Will continue to monitor intake and output.  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     Patient seen, findings and plan discussed with CV ICU staff, Dr. Garcia.    Bita Nance MD  CA-1/PGY-2 Anesthesiolog      ====================================  Subjective:  PA pressure increasing overnight. Given 1 dose of 80 of lasix at 5pm that decreased PA pressures. CXR improved after receiving diuresis.     Patient is feeling better this morning. Intermittent sleep patterns. No other complaints.         PAST MEDICAL HISTORY: No past medical history on file.    PAST SURGICAL HISTORY:   Past Surgical History:   Procedure Laterality Date     CV CENTRAL VENOUS CATHETER PLACEMENT N/A 2/13/2020    Procedure: Central Venous Catheter Placement;  Surgeon: Chente Moss MD;  Location:  HEART CARDIAC CATH LAB     CV INTRA-AORTIC BALLOON PUMP INSERTION N/A 2/7/2020    Procedure: Intra-Aortic Balloon Pump Insertion;  Surgeon: Jose Baldwin MD;  Location: [facility] CARDIAC CATH LAB     CV INTRA-AORTIC BALLOON PUMP INSERTION N/A 2/13/2020    Procedure: Intra-Aortic Balloon Pump Insertion;  Surgeon: Chente Moss MD;  Location: [facility] CARDIAC CATH LAB     CV SWAN LUCIANA PROCEDURE N/A 2/13/2020    Procedure: [place] Luciana Procedure;   Surgeon: Chente Moss MD;  Location:  HEART CARDIAC CATH LAB     INSERT VENTRICULAR ASSIST DEVICE LEFT (HEARTMATE II) N/A 2/18/2020    Procedure: INSERTION, LEFT VENTRICULAR ASSIST DEVICE (HEARTMATE III);  Surgeon: Mac Jaramillo MD;  Location:  OR       FAMILY HISTORY: No family history on file.    SOCIAL HISTORY:   Social History     Tobacco Use     Smoking status: Not on file   Substance Use Topics     Alcohol use: Not on file         OBJECTIVE:   1. VITAL SIGNS:   Temp:  [98.4  F (36.9  C)-100.8  F (38.2  C)] 98.6  F (37  C)  Pulse:  [80-98] 84  Heart Rate:  [79-99] 84  Resp:  [8-25] 9  BP: ()/(51-99) 106/59  MAP:  [67 mmHg-110 mmHg] 91 mmHg  Arterial Line BP: ()/(49-98) 101/81  SpO2:  [91 %-100 %] 95 %    Resp: 9        2. INTAKE/ OUTPUT:   I/O last 3 completed shifts:  In: 4225.33 [P.O.:1880; I.V.:1302.33; Other:63; NG/GT:485]  Out: 2450 [Urine:1560; Chest Tube:890]    3. PHYSICAL EXAMINATION:   General: NAD, laying in hospital bed  Neuro: no focal deficit, follows commands in all 4 extremities  Resp: non labored breathing on RA  CV: RRR  Abdomen: Soft, Non-distended, Non-tender  Incisions: c/d/i  Extremities: warm and well perfused, 1+ edema    4. INVESTIGATIONS:   Arterial Blood Gases     Recent Labs   Lab 02/23/20  0333 02/22/20 1959 02/22/20  1451   WBC 15.1*  --  15.1*   HGB 9.2*  --  9.2*     --  293   INR 1.49*  --  1.57*     --  144   POTASSIUM 3.8 3.6 3.6   BUN 38*  --  35*   CR 1.46*  --  1.52*           5. RADIOLOGY:     =========================================

## 2020-02-23 NOTE — PROGRESS NOTES
CVTS Progress Note        CO-MORBIDITIES:   Patient Active Problem List   Diagnosis     Acute on chronic systolic congestive heart failure (H)     Anxiety     CKD (chronic kidney disease) stage 3, GFR 30-59 ml/min (H)     Coronary artery disease involving native coronary artery of native heart without angina pectoris     GERD (gastroesophageal reflux disease)     Gout     Hyperlipidemia with target LDL less than 100     Nonischemic cardiomyopathy (H)     Non-nephrotic range proteinuria     ABHINAV on CPAP     Type 2 diabetes mellitus with stage 3 chronic kidney disease, without long-term current use of insulin (H)     Heart failure (H)     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 Redd on 2/18 for chronic systolic heart failure 2/2 NICM. Extubated 2/21.    PLAN FOR TODAY:  -Transitioned to oral amiodarone   -Diuresis lasix 80 x 2 doses  -Started hydralazine 25 mg TID  -Keep dobutamine     PLAN:  Neuro/ pain/ sedation:  - Monitor neurological status. Notify the MD for any acute changes in exam.  -dilaudid, tylenol, gabapentin, robaxin   - continue home fluoxetine    Pulmonary care:   #COPD  - extubated 2/21  - ABHINAV on CPAP, CPAP overnight ordered    Cardiovascular:    #Moderate CAD ([facility] 11/2019 nonobstructive CAD w/ severe branch disease)  #CHF EF 15-20% s/p LVAD placement  #Vfib this admission, s/p amio load  - Monitor hemodynamic status  - dobutamine gtt for MAP >65, keep dobutamine per Cards  - Hold PTA entresto, spironolactone  - continue ASA 81 mg, atorvastatin  - continue amio 400 mg BID  - Jeramy weaned 2/21  - Start hydralazine 25 mg TID    GI care:   - dysphagia 3 diet  - NJ for TF, will remove today if eating goes well    Fluids/ Electrolytes/ Nutrition:   - TKO for IV fluid hydration  - No indication for parenteral nutrition    Renal/ Fluid Balance:    #CKD  III  - Urine output is adequate so far.  - Will continue to monitor intake and output.  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     Patient seen, findings and plan discussed with CVTS fellow    Bita Nance MD  CA-1/PGY-2 Anesthesiolog      ====================================  Subjective:  PA pressure increasing overnight. Given 1 dose of 80 of lasix at 5pm that decreased PA pressures. CXR improved after receiving diuresis.     Patient is feeling better this morning. Intermittent sleep patterns. No other complaints.         PAST MEDICAL HISTORY: No past medical history on file.    PAST SURGICAL HISTORY:   Past Surgical History:   Procedure Laterality Date     CV CENTRAL VENOUS CATHETER PLACEMENT N/A 2/13/2020    Procedure: Central Venous Catheter Placement;  Surgeon: Chente Moss MD;  Location: [facility] CARDIAC CATH LAB     CV INTRA-AORTIC BALLOON PUMP INSERTION N/A 2/7/2020    Procedure: Intra-Aortic Balloon Pump Insertion;  Surgeon: Jose Baldwin MD;  Location: [facility] CARDIAC CATH LAB     CV INTRA-AORTIC BALLOON PUMP INSERTION N/A 2/13/2020    Procedure: Intra-Aortic Balloon Pump Insertion;  Surgeon: Chente Moss MD;  Location: [facility] CARDIAC CATH LAB     CV SWAN LUCIANA PROCEDURE N/A 2/13/2020    Procedure: [place] Luciana Procedure;  Surgeon:  Chente Moss MD;  Location:  HEART CARDIAC CATH LAB     INSERT VENTRICULAR ASSIST DEVICE LEFT (HEARTMATE II) N/A 2/18/2020    Procedure: INSERTION, LEFT VENTRICULAR ASSIST DEVICE (HEARTMATE III);  Surgeon: Mac Jaramillo MD;  Location:  OR       FAMILY HISTORY: No family history on file.    SOCIAL HISTORY:   Social History     Tobacco Use     Smoking status: Not on file   Substance Use Topics     Alcohol use: Not on file         OBJECTIVE:   1. VITAL SIGNS:   Temp:  [98.4  F (36.9  C)-100.6  F (38.1  C)] 99.7  F (37.6  C)  Pulse:  [80-98] 97  Heart Rate:  [79-99] 98  Resp:  [8-29] 24  BP: ()/() 101/65  MAP:  [67 mmHg-114 mmHg] 91 mmHg  Arterial Line BP: ()/() 99/84  SpO2:  [91 %-100 %] 97 %    Resp: 24        2. INTAKE/ OUTPUT:   I/O last 3 completed shifts:  In: 4440.2 [P.O.:1400; I.V.:1406.2; Other:99; NG/GT:590]  Out: 3225 [Urine:2575; Chest Tube:650]    3. PHYSICAL EXAMINATION:   General: NAD, laying in hospital bed  Neuro: no focal deficit, follows commands in all 4 extremities  Resp: non labored breathing on RA  CV: RRR  Abdomen: Soft, Non-distended, Non-tender  Incisions: c/d/i  Extremities: warm and well perfused, 1+ edema    4. INVESTIGATIONS:   Arterial Blood Gases     Recent Labs   Lab 02/23/20  0333 02/22/20  1959 02/22/20  1451   WBC 15.1*  --  15.1*   HGB 9.2*  --  9.2*     --  293   INR 1.49*  --  1.57*     --  144   POTASSIUM 3.8 3.6 3.6   BUN 38*  --  35*   CR 1.46*  --  1.52*           5. RADIOLOGY:     =========================================       Patient seen and examined. Investigations reviewed. Continue to slowly wean inotropes. Continue current anticoagulation plans. I agree with the findings outlined in the resident's note. I spent a total of 20 minutes examining the patient, reviewing investigations and therapeutic counseling.

## 2020-02-24 ENCOUNTER — APPOINTMENT (OUTPATIENT)
Dept: GENERAL RADIOLOGY | Facility: CLINIC | Age: 65
DRG: 001 | End: 2020-02-24
Attending: STUDENT IN AN ORGANIZED HEALTH CARE EDUCATION/TRAINING PROGRAM
Payer: MEDICARE

## 2020-02-24 ENCOUNTER — APPOINTMENT (OUTPATIENT)
Dept: SPEECH THERAPY | Facility: CLINIC | Age: 65
DRG: 001 | End: 2020-02-24
Attending: INTERNAL MEDICINE
Payer: MEDICARE

## 2020-02-24 LAB
ALBUMIN SERPL-MCNC: 1.8 G/DL (ref 3.4–5)
ALP SERPL-CCNC: 80 U/L (ref 40–150)
ALT SERPL W P-5'-P-CCNC: 19 U/L (ref 0–70)
ANION GAP SERPL CALCULATED.3IONS-SCNC: 4 MMOL/L (ref 3–14)
ANION GAP SERPL CALCULATED.3IONS-SCNC: 4 MMOL/L (ref 3–14)
APTT PPP: 52 SEC (ref 22–37)
AST SERPL W P-5'-P-CCNC: 31 U/L (ref 0–45)
BASE EXCESS BLDA CALC-SCNC: 5.3 MMOL/L
BASE EXCESS BLDV CALC-SCNC: 0.5 MMOL/L
BASE EXCESS BLDV CALC-SCNC: 2.9 MMOL/L
BASE EXCESS BLDV CALC-SCNC: 3 MMOL/L
BASE EXCESS BLDV CALC-SCNC: 3.5 MMOL/L
BASE EXCESS BLDV CALC-SCNC: 7.5 MMOL/L
BILIRUB DIRECT SERPL-MCNC: 0.1 MG/DL (ref 0–0.2)
BILIRUB SERPL-MCNC: 0.2 MG/DL (ref 0.2–1.3)
BUN SERPL-MCNC: 42 MG/DL (ref 7–30)
BUN SERPL-MCNC: 49 MG/DL (ref 7–30)
CALCIUM SERPL-MCNC: 6.4 MG/DL (ref 8.5–10.1)
CALCIUM SERPL-MCNC: 7 MG/DL (ref 8.5–10.1)
CHLORIDE SERPL-SCNC: 107 MMOL/L (ref 94–109)
CHLORIDE SERPL-SCNC: 108 MMOL/L (ref 94–109)
CO2 SERPL-SCNC: 28 MMOL/L (ref 20–32)
CO2 SERPL-SCNC: 30 MMOL/L (ref 20–32)
CREAT SERPL-MCNC: 0.97 MG/DL (ref 0.66–1.25)
CREAT SERPL-MCNC: 1.21 MG/DL (ref 0.66–1.25)
ERYTHROCYTE [DISTWIDTH] IN BLOOD BY AUTOMATED COUNT: 17.2 % (ref 10–15)
ERYTHROCYTE [DISTWIDTH] IN BLOOD BY AUTOMATED COUNT: 17.4 % (ref 10–15)
FACT X ACT/NOR PPP CHRO: 107 % (ref 70–130)
GFR SERPL CREATININE-BSD FRML MDRD: 62 ML/MIN/{1.73_M2}
GFR SERPL CREATININE-BSD FRML MDRD: 81 ML/MIN/{1.73_M2}
GLUCOSE BLDC GLUCOMTR-MCNC: 128 MG/DL (ref 70–99)
GLUCOSE BLDC GLUCOMTR-MCNC: 172 MG/DL (ref 70–99)
GLUCOSE BLDC GLUCOMTR-MCNC: 185 MG/DL (ref 70–99)
GLUCOSE BLDC GLUCOMTR-MCNC: 191 MG/DL (ref 70–99)
GLUCOSE BLDC GLUCOMTR-MCNC: 202 MG/DL (ref 70–99)
GLUCOSE BLDC GLUCOMTR-MCNC: 236 MG/DL (ref 70–99)
GLUCOSE SERPL-MCNC: 151 MG/DL (ref 70–99)
GLUCOSE SERPL-MCNC: 252 MG/DL (ref 70–99)
HCO3 BLD-SCNC: 30 MMOL/L (ref 21–28)
HCO3 BLDV-SCNC: 25 MMOL/L (ref 21–28)
HCO3 BLDV-SCNC: 27 MMOL/L (ref 21–28)
HCO3 BLDV-SCNC: 28 MMOL/L (ref 21–28)
HCO3 BLDV-SCNC: 28 MMOL/L (ref 21–28)
HCO3 BLDV-SCNC: 33 MMOL/L (ref 21–28)
HCT VFR BLD AUTO: 29.9 % (ref 40–53)
HCT VFR BLD AUTO: 30.9 % (ref 40–53)
HGB BLD-MCNC: 9 G/DL (ref 13.3–17.7)
HGB BLD-MCNC: 9.6 G/DL (ref 13.3–17.7)
INR PPP: 1.43 (ref 0.86–1.14)
INTERPRETATION ECG - MUSE: NORMAL
INTERPRETATION ECG - MUSE: NORMAL
LACTATE BLD-SCNC: 0.7 MMOL/L (ref 0.7–2)
MAGNESIUM SERPL-MCNC: 1.8 MG/DL (ref 1.6–2.3)
MAGNESIUM SERPL-MCNC: 2 MG/DL (ref 1.6–2.3)
MCH RBC QN AUTO: 28.5 PG (ref 26.5–33)
MCH RBC QN AUTO: 29 PG (ref 26.5–33)
MCHC RBC AUTO-ENTMCNC: 30.1 G/DL (ref 31.5–36.5)
MCHC RBC AUTO-ENTMCNC: 31.1 G/DL (ref 31.5–36.5)
MCV RBC AUTO: 93 FL (ref 78–100)
MCV RBC AUTO: 95 FL (ref 78–100)
O2/TOTAL GAS SETTING VFR VENT: 21 %
O2/TOTAL GAS SETTING VFR VENT: 820 %
O2/TOTAL GAS SETTING VFR VENT: ABNORMAL %
O2/TOTAL GAS SETTING VFR VENT: NORMAL %
OXYHGB MFR BLD: 97 % (ref 92–100)
OXYHGB MFR BLDV: 46 %
OXYHGB MFR BLDV: 49 %
OXYHGB MFR BLDV: 50 %
OXYHGB MFR BLDV: 52 %
OXYHGB MFR BLDV: 55 %
PCO2 BLD: 42 MM HG (ref 35–45)
PCO2 BLDV: 39 MM HG (ref 40–50)
PCO2 BLDV: 41 MM HG (ref 40–50)
PCO2 BLDV: 44 MM HG (ref 40–50)
PCO2 BLDV: 44 MM HG (ref 40–50)
PCO2 BLDV: 47 MM HG (ref 40–50)
PH BLD: 7.46 PH (ref 7.35–7.45)
PH BLDV: 7.4 PH (ref 7.32–7.43)
PH BLDV: 7.41 PH (ref 7.32–7.43)
PH BLDV: 7.42 PH (ref 7.32–7.43)
PH BLDV: 7.45 PH (ref 7.32–7.43)
PH BLDV: 7.45 PH (ref 7.32–7.43)
PLATELET # BLD AUTO: 372 10E9/L (ref 150–450)
PLATELET # BLD AUTO: 374 10E9/L (ref 150–450)
PO2 BLD: 123 MM HG (ref 80–105)
PO2 BLDV: 28 MM HG (ref 25–47)
PO2 BLDV: 28 MM HG (ref 25–47)
PO2 BLDV: 29 MM HG (ref 25–47)
PO2 BLDV: 29 MM HG (ref 25–47)
PO2 BLDV: 31 MM HG (ref 25–47)
POTASSIUM BLD-SCNC: 4.4 MMOL/L (ref 3.4–5.3)
POTASSIUM SERPL-SCNC: 3.3 MMOL/L (ref 3.4–5.3)
POTASSIUM SERPL-SCNC: 3.4 MMOL/L (ref 3.4–5.3)
POTASSIUM SERPL-SCNC: 3.5 MMOL/L (ref 3.4–5.3)
PROT SERPL-MCNC: 5.1 G/DL (ref 6.8–8.8)
RBC # BLD AUTO: 3.16 10E12/L (ref 4.4–5.9)
RBC # BLD AUTO: 3.31 10E12/L (ref 4.4–5.9)
SODIUM SERPL-SCNC: 140 MMOL/L (ref 133–144)
SODIUM SERPL-SCNC: 140 MMOL/L (ref 133–144)
WBC # BLD AUTO: 17 10E9/L (ref 4–11)
WBC # BLD AUTO: 18 10E9/L (ref 4–11)

## 2020-02-24 PROCEDURE — 25800030 ZZH RX IP 258 OP 636: Performed by: STUDENT IN AN ORGANIZED HEALTH CARE EDUCATION/TRAINING PROGRAM

## 2020-02-24 PROCEDURE — 25000132 ZZH RX MED GY IP 250 OP 250 PS 637: Mod: GY | Performed by: THORACIC SURGERY (CARDIOTHORACIC VASCULAR SURGERY)

## 2020-02-24 PROCEDURE — 25000132 ZZH RX MED GY IP 250 OP 250 PS 637: Mod: GY | Performed by: STUDENT IN AN ORGANIZED HEALTH CARE EDUCATION/TRAINING PROGRAM

## 2020-02-24 PROCEDURE — 25000128 H RX IP 250 OP 636: Performed by: STUDENT IN AN ORGANIZED HEALTH CARE EDUCATION/TRAINING PROGRAM

## 2020-02-24 PROCEDURE — 84132 ASSAY OF SERUM POTASSIUM: CPT | Performed by: STUDENT IN AN ORGANIZED HEALTH CARE EDUCATION/TRAINING PROGRAM

## 2020-02-24 PROCEDURE — 40000275 ZZH STATISTIC RCP TIME EA 10 MIN

## 2020-02-24 PROCEDURE — 83735 ASSAY OF MAGNESIUM: CPT | Performed by: STUDENT IN AN ORGANIZED HEALTH CARE EDUCATION/TRAINING PROGRAM

## 2020-02-24 PROCEDURE — 71045 X-RAY EXAM CHEST 1 VIEW: CPT

## 2020-02-24 PROCEDURE — 80048 BASIC METABOLIC PNL TOTAL CA: CPT | Performed by: INTERNAL MEDICINE

## 2020-02-24 PROCEDURE — 92526 ORAL FUNCTION THERAPY: CPT | Mod: GN | Performed by: SPEECH-LANGUAGE PATHOLOGIST

## 2020-02-24 PROCEDURE — 82805 BLOOD GASES W/O2 SATURATION: CPT

## 2020-02-24 PROCEDURE — 85730 THROMBOPLASTIN TIME PARTIAL: CPT | Performed by: INTERNAL MEDICINE

## 2020-02-24 PROCEDURE — 00000146 ZZHCL STATISTIC GLUCOSE BY METER IP

## 2020-02-24 PROCEDURE — 80076 HEPATIC FUNCTION PANEL: CPT | Performed by: STUDENT IN AN ORGANIZED HEALTH CARE EDUCATION/TRAINING PROGRAM

## 2020-02-24 PROCEDURE — 85610 PROTHROMBIN TIME: CPT | Performed by: INTERNAL MEDICINE

## 2020-02-24 PROCEDURE — 25000128 H RX IP 250 OP 636: Performed by: SURGERY

## 2020-02-24 PROCEDURE — 27210437 ZZH NUTRITION PRODUCT SEMIELEM INTERMED LITER

## 2020-02-24 PROCEDURE — 82805 BLOOD GASES W/O2 SATURATION: CPT | Performed by: STUDENT IN AN ORGANIZED HEALTH CARE EDUCATION/TRAINING PROGRAM

## 2020-02-24 PROCEDURE — 40000196 ZZH STATISTIC RAPCV CVP MONITORING

## 2020-02-24 PROCEDURE — 85027 COMPLETE CBC AUTOMATED: CPT | Performed by: INTERNAL MEDICINE

## 2020-02-24 PROCEDURE — 99233 SBSQ HOSP IP/OBS HIGH 50: CPT | Mod: GC | Performed by: INTERNAL MEDICINE

## 2020-02-24 PROCEDURE — 40000014 ZZH STATISTIC ARTERIAL MONITORING DAILY

## 2020-02-24 PROCEDURE — 83605 ASSAY OF LACTIC ACID: CPT | Performed by: STUDENT IN AN ORGANIZED HEALTH CARE EDUCATION/TRAINING PROGRAM

## 2020-02-24 PROCEDURE — 20000004 ZZH R&B ICU UMMC

## 2020-02-24 PROCEDURE — 85260 CLOT FACTOR X STUART-POWER: CPT | Performed by: INTERNAL MEDICINE

## 2020-02-24 RX ORDER — HYDRALAZINE HYDROCHLORIDE 50 MG/1
50 TABLET, FILM COATED ORAL EVERY 8 HOURS SCHEDULED
Status: DISCONTINUED | OUTPATIENT
Start: 2020-02-24 | End: 2020-02-24

## 2020-02-24 RX ORDER — HYDRALAZINE HYDROCHLORIDE 25 MG/1
25 TABLET, FILM COATED ORAL ONCE
Status: COMPLETED | OUTPATIENT
Start: 2020-02-24 | End: 2020-02-24

## 2020-02-24 RX ORDER — HYDROMORPHONE HYDROCHLORIDE 2 MG/1
2 TABLET ORAL EVERY 4 HOURS PRN
Status: DISCONTINUED | OUTPATIENT
Start: 2020-02-24 | End: 2020-03-20 | Stop reason: HOSPADM

## 2020-02-24 RX ORDER — FUROSEMIDE 10 MG/ML
80 INJECTION INTRAMUSCULAR; INTRAVENOUS ONCE
Status: COMPLETED | OUTPATIENT
Start: 2020-02-24 | End: 2020-02-24

## 2020-02-24 RX ORDER — FUROSEMIDE 10 MG/ML
80 INJECTION INTRAMUSCULAR; INTRAVENOUS
Status: DISCONTINUED | OUTPATIENT
Start: 2020-02-24 | End: 2020-02-25

## 2020-02-24 RX ORDER — WARFARIN SODIUM 5 MG/1
5 TABLET ORAL
Status: COMPLETED | OUTPATIENT
Start: 2020-02-24 | End: 2020-02-24

## 2020-02-24 RX ADMIN — CEFTRIAXONE 2 G: 2 INJECTION, POWDER, FOR SOLUTION INTRAMUSCULAR; INTRAVENOUS at 10:32

## 2020-02-24 RX ADMIN — BIVALIRUDIN 0.02 MG/KG/HR: 250 INJECTION, POWDER, LYOPHILIZED, FOR SOLUTION INTRAVENOUS at 10:46

## 2020-02-24 RX ADMIN — MULTIVITAMIN 15 ML: LIQUID ORAL at 08:44

## 2020-02-24 RX ADMIN — METHOCARBAMOL 500 MG: 500 TABLET, FILM COATED ORAL at 08:44

## 2020-02-24 RX ADMIN — ASPIRIN 81 MG CHEWABLE TABLET 81 MG: 81 TABLET CHEWABLE at 08:44

## 2020-02-24 RX ADMIN — FLUOXETINE HYDROCHLORIDE 20 MG: 20 LIQUID ORAL at 08:45

## 2020-02-24 RX ADMIN — HYDROMORPHONE HYDROCHLORIDE 2 MG: 2 TABLET ORAL at 01:49

## 2020-02-24 RX ADMIN — ALLOPURINOL 200 MG: 100 TABLET ORAL at 08:43

## 2020-02-24 RX ADMIN — ACETAMINOPHEN 650 MG: 325 TABLET, FILM COATED ORAL at 08:43

## 2020-02-24 RX ADMIN — DOBUTAMINE 2.5 MCG/KG/MIN: 12.5 INJECTION, SOLUTION INTRAVENOUS at 14:56

## 2020-02-24 RX ADMIN — POTASSIUM CHLORIDE 20 MEQ: 1.5 POWDER, FOR SOLUTION ORAL at 10:33

## 2020-02-24 RX ADMIN — QUETIAPINE FUMARATE 25 MG: 25 TABLET ORAL at 22:12

## 2020-02-24 RX ADMIN — AMPICILLIN SODIUM 2 G: 2 INJECTION, POWDER, FOR SOLUTION INTRAMUSCULAR; INTRAVENOUS at 01:52

## 2020-02-24 RX ADMIN — HYDRALAZINE HYDROCHLORIDE 25 MG: 25 TABLET, FILM COATED ORAL at 16:55

## 2020-02-24 RX ADMIN — Medication 40 MG: at 08:45

## 2020-02-24 RX ADMIN — POTASSIUM CHLORIDE 20 MEQ: 1.5 POWDER, FOR SOLUTION ORAL at 17:43

## 2020-02-24 RX ADMIN — Medication 200 MG: at 08:57

## 2020-02-24 RX ADMIN — FUROSEMIDE 80 MG: 10 INJECTION, SOLUTION INTRAVENOUS at 10:32

## 2020-02-24 RX ADMIN — FUROSEMIDE 80 MG: 10 INJECTION, SOLUTION INTRAVENOUS at 00:49

## 2020-02-24 RX ADMIN — AMPICILLIN SODIUM 2 G: 2 INJECTION, POWDER, FOR SOLUTION INTRAMUSCULAR; INTRAVENOUS at 14:56

## 2020-02-24 RX ADMIN — AMIODARONE HYDROCHLORIDE 400 MG: 200 TABLET ORAL at 08:44

## 2020-02-24 RX ADMIN — HYDROMORPHONE HYDROCHLORIDE 2 MG: 2 TABLET ORAL at 05:55

## 2020-02-24 RX ADMIN — ATORVASTATIN CALCIUM 20 MG: 20 TABLET, FILM COATED ORAL at 20:18

## 2020-02-24 RX ADMIN — MAGNESIUM OXIDE 400 MG: 400 TABLET ORAL at 08:44

## 2020-02-24 RX ADMIN — POTASSIUM CHLORIDE 40 MEQ: 1.5 POWDER, FOR SOLUTION ORAL at 16:55

## 2020-02-24 RX ADMIN — AMPICILLIN SODIUM 2 G: 2 INJECTION, POWDER, FOR SOLUTION INTRAMUSCULAR; INTRAVENOUS at 08:42

## 2020-02-24 RX ADMIN — GABAPENTIN 100 MG: 100 CAPSULE ORAL at 20:18

## 2020-02-24 RX ADMIN — CEFTRIAXONE 2 G: 2 INJECTION, POWDER, FOR SOLUTION INTRAMUSCULAR; INTRAVENOUS at 22:07

## 2020-02-24 RX ADMIN — HYDRALAZINE HYDROCHLORIDE 25 MG: 25 TABLET, FILM COATED ORAL at 05:55

## 2020-02-24 RX ADMIN — AMIODARONE HYDROCHLORIDE 75 MG: 50 INJECTION, SOLUTION INTRAVENOUS at 03:19

## 2020-02-24 RX ADMIN — MAGNESIUM SULFATE 2 G: 2 INJECTION INTRAVENOUS at 11:36

## 2020-02-24 RX ADMIN — WARFARIN SODIUM 5 MG: 5 TABLET ORAL at 17:43

## 2020-02-24 RX ADMIN — GABAPENTIN 100 MG: 100 CAPSULE ORAL at 14:56

## 2020-02-24 RX ADMIN — HYDRALAZINE HYDROCHLORIDE 50 MG: 50 TABLET, FILM COATED ORAL at 14:56

## 2020-02-24 RX ADMIN — POTASSIUM CHLORIDE 20 MEQ: 29.8 INJECTION, SOLUTION INTRAVENOUS at 05:14

## 2020-02-24 RX ADMIN — DICLOFENAC 4 G: 10 GEL TOPICAL at 08:58

## 2020-02-24 RX ADMIN — Medication 100 MG: at 08:44

## 2020-02-24 RX ADMIN — AMPICILLIN SODIUM 2 G: 2 INJECTION, POWDER, FOR SOLUTION INTRAMUSCULAR; INTRAVENOUS at 20:19

## 2020-02-24 RX ADMIN — GABAPENTIN 100 MG: 100 CAPSULE ORAL at 08:44

## 2020-02-24 RX ADMIN — METHOCARBAMOL 500 MG: 500 TABLET, FILM COATED ORAL at 20:18

## 2020-02-24 RX ADMIN — FUROSEMIDE 80 MG: 10 INJECTION, SOLUTION INTRAVENOUS at 17:43

## 2020-02-24 RX ADMIN — MELATONIN TAB 3 MG 3 MG: 3 TAB at 22:13

## 2020-02-24 RX ADMIN — HYDRALAZINE HYDROCHLORIDE 75 MG: 50 TABLET, FILM COATED ORAL at 22:07

## 2020-02-24 ASSESSMENT — ACTIVITIES OF DAILY LIVING (ADL)
ADLS_ACUITY_SCORE: 16

## 2020-02-24 ASSESSMENT — PAIN DESCRIPTION - DESCRIPTORS
DESCRIPTORS: ACHING
DESCRIPTORS: ACHING

## 2020-02-24 NOTE — PROGRESS NOTES
D: Stopped by to see patient. No VAD related questions or concerns at this time.  I: Discussed POC and provided support and listened to patient and care giver's thoughts and concerns.  P: Continue to follow patient and address any questions or concerns patient and or caregiver may have.      Teaching sessions arranged for Wednesday, Thursday & Friday @ 10-12

## 2020-02-24 NOTE — PLAN OF CARE
OT 4E: Cancel - pt not hemodynamically stable this AM, therapist unable to check back at a later time. Will reschedule per POC.

## 2020-02-24 NOTE — PLAN OF CARE
Acute events 9724-1849:    Heartmate 3 speed increased to 5500 RPM    Neuro: Intact, oriented, follows commands. Incisional/chest tube pain with movement--receiving acetaminophen.     Cardiac: Atrial fib since 0130 with rate 105-130s. Dobutamine and bivalirudin drips. Heartmate 3 at 5500 RPM with no alarms. Trending JOSE calculations via [place]ina catheter. CI around 1.9-2.1 today.     Pulm: Room air. Diminished sounds in bases--encouraging incentive spirometry. Moderate, serosanguineous chest tube output.    Renal/: Lasix IV TID. Pt voiding with some straining. Replacing electrolytes per standing orders.     Endo/GI: Advanced dysphagia per SLP. Cyclic tube feedings at night to supplement oral intake. Poor appetite due to poor room service food quality--consulted dietician. Subcutaneous insulin given with carb count coverage.    Integu: Blanchable redness on bottom/perineum. Chest tube sites. Midline sternal incision with wound vac.     Musc: Able to help somewhat with repositioning. Good strength with hands; able to use phone and feed self. Lift to chair this afternoon.    Lines/Devices: internal jugular cordis with double lumen CVC and [place]-Nina catheter, PIV, chest tube x 5 to 2 containers, wound vac, Heartmate 3, NJ small bore feeding tube.     Plan/Follow-up: Wean drips per MD. Trend JOSE numbers. Rehab. Calorie counts and encouraging oral intake. Frequent checks and replacements for electrolytes.       Mima Vila   RN, CRRN

## 2020-02-24 NOTE — PROGRESS NOTES
CVTS Progress Note        CO-MORBIDITIES:   Patient Active Problem List   Diagnosis     Acute on chronic systolic congestive heart failure (H)     Anxiety     CKD (chronic kidney disease) stage 3, GFR 30-59 ml/min (H)     Coronary artery disease involving native coronary artery of native heart without angina pectoris     GERD (gastroesophageal reflux disease)     Gout     Hyperlipidemia with target LDL less than 100     Nonischemic cardiomyopathy (H)     Non-nephrotic range proteinuria     ABHINAV on CPAP     Type 2 diabetes mellitus with stage 3 chronic kidney disease, without long-term current use of insulin (H)     Heart failure (H)     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 Redd on 2/18 for chronic systolic heart failure 2/2 NICM. Extubated 2/21.    PLAN FOR TODAY:  80mg lasix TID scheduled  Increase speed to 5500  Hydrazine 50 Tid   Turn off dobutamine later today if able  Continue amio   Increase bival     PLAN:  Neuro/ pain/ sedation:  - Monitor neurological status. Notify the MD for any acute changes in exam.  -dilaudid, tylenol, gabapentin, robaxin   - continue home fluoxetine    Pulmonary care:   #COPD  - extubated 2/21  - ABHINAV on CPAP, CPAP overnight ordered    Cardiovascular:    #Moderate CAD ([facility] 11/2019 nonobstructive CAD w/ severe branch disease)  #CHF EF 15-20% s/p LVAD placement  #Vfib this admission, s/p amio load  - Monitor hemodynamic status  - dobutamine gtt for MAP >65, keep dobutamine per Cards. Continue to wean  - Hold PTA entresto, spironolactone  - continue ASA 81 mg, atorvastatin  - continue amio 400 mg BID  - Jeramy weaned 2/21  - Start hydralazine 25 mg TID    GI care:   - dysphagia 3 diet  - NJ for TF, will remove today if eating goes well    Fluids/ Electrolytes/ Nutrition:   - TKO for IV fluid hydration  - No indication for parenteral  nutrition    Renal/ Fluid Balance:    #CKD III  - Urine output is adequate so far.  - Will continue to monitor intake and output.  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     Patient seen, findings and plan discussed with CVTS fellow    Loreto Holcomb MD   Anesthesia Resident       ====================================  Subjective:  PA pressure increasing overnight. Given 1 dose of 80 of lasix at 5pm that decreased PA pressures. CXR improved after receiving diuresis.     Patient is feeling better this morning. Intermittent sleep patterns. No other complaints.         PAST MEDICAL HISTORY: No past medical history on file.    PAST SURGICAL HISTORY:   Past Surgical History:   Procedure Laterality Date     CV CENTRAL VENOUS CATHETER PLACEMENT N/A 2/13/2020    Procedure: Central Venous Catheter Placement;  Surgeon: Chente Moss MD;  Location:  HEART CARDIAC CATH LAB     CV INTRA-AORTIC BALLOON PUMP INSERTION N/A 2/7/2020    Procedure: Intra-Aortic Balloon Pump Insertion;  Surgeon: Jose Baldwin MD;  Location: [facility] CARDIAC CATH LAB     CV INTRA-AORTIC BALLOON PUMP INSERTION N/A 2/13/2020    Procedure: Intra-Aortic Balloon Pump Insertion;  Surgeon: Chente Moss MD;  Location:  HEART CARDIAC CATH LAB     CV SWAN LUCIANA PROCEDURE N/A 2/13/2020    Procedure: [place] Luciana Procedure;   Surgeon: Chente Moss MD;  Location:  HEART CARDIAC CATH LAB     INSERT VENTRICULAR ASSIST DEVICE LEFT (HEARTMATE II) N/A 2/18/2020    Procedure: INSERTION, LEFT VENTRICULAR ASSIST DEVICE (HEARTMATE III);  Surgeon: Mac Jaramillo MD;  Location:  OR       FAMILY HISTORY: No family history on file.    SOCIAL HISTORY:   Social History     Tobacco Use     Smoking status: Not on file   Substance Use Topics     Alcohol use: Not on file         OBJECTIVE:   1. VITAL SIGNS:   Temp:  [97.8  F (36.6  C)-99.5  F (37.5  C)] 97.8  F (36.6  C)  Pulse:  [] 128  Heart Rate:  [] 106  Resp:  [14-39] 17  BP: ()/() 71/33  MAP:  [71 mmHg-113 mmHg] 81 mmHg  Arterial Line BP: ()/() 88/71  SpO2:  [95 %-100 %] 97 %    Resp: 17        2. INTAKE/ OUTPUT:   I/O last 3 completed shifts:  In: 4918.1 [P.O.:1350; I.V.:1382.1; Other:36; NG/GT:435]  Out: 4295 [Urine:3175; Chest Tube:1120]    3. PHYSICAL EXAMINATION:   General: NAD, laying in hospital bed  Neuro: no focal deficit, follows commands in all 4 extremities  Resp: non labored breathing on RA  CV: RRR  Abdomen: Soft, Non-distended, Non-tender  Incisions: c/d/i  Extremities: warm and well perfused, 1+ edema    4. INVESTIGATIONS:   Arterial Blood Gases     Recent Labs   Lab 02/24/20  0820 02/24/20  0345 02/23/20  1526 02/23/20  0333   WBC  --  17.0* 15.0* 15.1*   HGB  --  9.6* 9.5* 9.2*   PLT  --  372 332 274   INR  --  1.43*  --  1.49*   NA  --  140 140 144   POTASSIUM 3.4 3.5 3.6 3.8   BUN  --  49* 42* 38*   CR  --  1.21 1.38* 1.46*           5. RADIOLOGY:     =========================================    Patient seen and examined. Investigations reviewed. Agree with plan to wean inotropes and continue with diuresis. I agree with the findings outlined in the resident's note. I spent a total of 25 minutes examining the patient, reviewing investigations and therapeutic counseling.

## 2020-02-24 NOTE — PLAN OF CARE
Discharge Planner SLP   Patient plan for discharge: not discussed  Current status: Patient seen for dysphagia treatment while positioned upright in bed. Note occasional throat clearing after swallows, especially with larger bolus size and when not tucking chin. Minimal to no throat clearing occurred with small single sips and bites when using chin tuck. Note slow but functional mastication of soft solid textures and mild diffuse lingual residue which eventually cleared with subsequent swallows.     Recommend continue dysphagia diet level 3 (chopped) with thin liquids given consistent use of swallow strategies (fully alert and upright position, small single sips/bites, chin tuck). Monitor for increased/persistent signs/symptoms of aspiration.    Barriers to return to prior living situation: Medical condition, weakness, modified diet  Recommendations for discharge: Rehab  Rationale for recommendations: Pt will benefit from ongoing ST targeting swallow function       Entered by: Anaid Ardon 02/24/2020 10:54 AM

## 2020-02-24 NOTE — PROVIDER NOTIFICATION
Pt converted to Afib, rates 120-130. Pt states he is also in much pain from sternal incision. Repositioned patient and called Cards 2. Discussed vitals and pt pain, and MD ordered dilaudid 2mg PO for pain and to follow up if HR and pain remain unchanged.

## 2020-02-24 NOTE — PROVIDER NOTIFICATION
Notified CVTS overnight MD of patient CVP 20, PA 62/28 and other vitals. Discussed recent vitals from the previous shifts and drips. MD suggested talking to Cards 2 fellow and to give lasix 80mg IV. RN then contacted Cards 2 MD and discussed current pt vitals including CVP and PA, CVTS orders and patient ICU course. MD stated he would pass PA and CVP on to dayshift.

## 2020-02-24 NOTE — PROGRESS NOTES
Advanced Heart Failure Consult Progress Note  Eliseo Tanner MRN: [id number]  Age: 64 year old, : 1955  Date: 2020              Assessment and Plan:     Mr Eliseo Tanner is a 65yo M w/PMHx notable for chronic systolic heart failure, NICM, moderate CAD, HTN, ABHINAV on CPAP, DM2, CKDIII who is transferred to [facility] for evaluation and management of advanced heart failure with arrhythmia now s/p HM 3 on .    Today's plan ()  -Keep dobutamine at 2.5, increase LVAD speed to 5500  -Keep SG catheter, hemodynamics Q6   -Lasix 80 mg IV BID, will monitor UO and give extra dose later if needed   -Increase hydralazine 50 mg po TID     Moderate CAD  Severe Mitral regurgitation  Chronic nonischemic systolic heart failure w/ reduced EF (15-20%) and exacerbation  NYHA Class III. Echo 2020: LVEF 15-20%; LVEDd 5.9 cm; 4+ MR. [facility] 2019 w/ nonobstructive CAD w/ severe branch disease. Presented with increased wt gain, SOB, LE edema concerning for HF exacerbation, initially concerning for cardiogenic shock. Admitted to [facility] for further evaluation regarding need for advanced HF therapies. Patient is now s/p HM III placement on  with early post-op course complicated by bleeding that is slowing down as of  AM.   -Trend Hbg q6h and transfuse for Hgb <7.0  -Monitor fibrinogen, INR, platelets  -Continue ASA 81, atorvastatin 20 mg  -Continue dobutamine 2.5mcg/kg/minute given MVO2 trending down, will plan on weaning tomorrow pending labs and hemodynamics.   -Keep HD internal jugular catheter, hemodynamics Q6  -Continue coumadin with goal INR 2-3   -Increased speed to 5400 on LVAD in AM  -Lasix 80 mg IV BID  -Increase hydralazine 50 mg po TID      Proteus and Enterococcus bacteremia  Organisms growing from 2/2 blood cultures from . Blood cultured from  NGTD and 2/15 growing 1/2 S.epi, likely contaminant per ID.  ID consulted, appreciate help. Will decide on final duration of  "abx.  -daily blood cultures until negative  -Zosyn (2/13-2/14)  -Tobramycin (2/13-2/14)  -Vancomycin (2/13-2/17  -Meropenem (2/14-2/15)  -Ceftriaxone (2/16-  -Ampicillin (2/16-    #Thrombocytopenia:  Concern for HIT given timing with respect to heparin exposure. HIT positive DAVINA negative. Antibody is now negative x2, thus no further indication for PLAX  -Heme consulted  -Bivalirudin gtt after LVAD surgery,goal chromogenic level 20-40     VFib requiring shock  Shocked x 3 prior to transfer, loaded with amio. No known prior history. Suspect related to underlying HF.   -Keep K>4, Mg>2  -Amio 400 mg PO QD  -Need ICD for secondary prevention      CODE: FULL CODE     Stanford Acuna M.D.  Cardiology Fellow              Subjective/Interval Events     No acute overnight events. This AM, patient denying pain, SOB, CP, lightheadedness although reported that he has been having difficulty sleeping           Objective     BP 96/86   Pulse 128   Temp 97.8  F (36.6  C) (Axillary)   Resp 24   Ht 1.702 m (5' 7\")   Wt 101.2 kg (223 lb 1.7 oz)   SpO2 97%   BMI 34.94 kg/m    Temp:  [97.8  F (36.6  C)-99.5  F (37.5  C)] 97.8  F (36.6  C)  Pulse:  [] 128  Heart Rate:  [] 117  Resp:  [14-39] 24  BP: ()/() 96/86  MAP:  [71 mmHg-113 mmHg] 87 mmHg  Arterial Line BP: ()/() 91/80  SpO2:  [95 %-100 %] 97 %  Wt Readings from Last 2 Encounters:   02/23/20 101.2 kg (223 lb 1.7 oz)     I/O last 3 completed shifts:  In: 4918.1 [P.O.:1350; I.V.:1382.1; Other:36; NG/GT:435]  Out: 4295 [Urine:3175; Chest Tube:1120]      Gen: No acute distress  HEENT: NC/AT, +JVD   PULM/THORAX: CTAB  CV: normal rate, regular rhythm, normal S1 and S2, LVAD hum  ABD: Soft, NTND, bowel sounds present, no masses  EXT: WWP. Trace LE edema, clubbing or cyanosis.  NEURO: A&Ox3                Data:     Recent Results (from the past 24 hour(s))   Basic metabolic panel    Collection Time: 02/23/20  3:26 PM   Result Value Ref Range    " Sodium 140 133 - 144 mmol/L    Potassium 3.6 3.4 - 5.3 mmol/L    Chloride 108 94 - 109 mmol/L    Carbon Dioxide 27 20 - 32 mmol/L    Anion Gap 5 3 - 14 mmol/L    Glucose 224 (H) 70 - 99 mg/dL    Urea Nitrogen 42 (H) 7 - 30 mg/dL    Creatinine 1.38 (H) 0.66 - 1.25 mg/dL    GFR Estimate 53 (L) >60 mL/min/[1.73_m2]    GFR Estimate If Black 61 >60 mL/min/[1.73_m2]    Calcium 7.0 (L) 8.5 - 10.1 mg/dL   CBC with platelets    Collection Time: 02/23/20  3:26 PM   Result Value Ref Range    WBC 15.0 (H) 4.0 - 11.0 10e9/L    RBC Count 3.26 (L) 4.4 - 5.9 10e12/L    Hemoglobin 9.5 (L) 13.3 - 17.7 g/dL    Hematocrit 30.5 (L) 40.0 - 53.0 %    MCV 94 78 - 100 fl    MCH 29.1 26.5 - 33.0 pg    MCHC 31.1 (L) 31.5 - 36.5 g/dL    RDW 17.3 (H) 10.0 - 15.0 %    Platelet Count 332 150 - 450 10e9/L   Phosphorus    Collection Time: 02/23/20  3:26 PM   Result Value Ref Range    Phosphorus 1.9 (L) 2.5 - 4.5 mg/dL   Magnesium    Collection Time: 02/23/20  3:26 PM   Result Value Ref Range    Magnesium 1.8 1.6 - 2.3 mg/dL   EKG 12-lead, tracing only    Collection Time: 02/23/20  4:41 PM   Result Value Ref Range    Interpretation ECG Click View Image link to view waveform and result    Blood gas venous and oxyhgb    Collection Time: 02/23/20  5:12 PM   Result Value Ref Range    Ph Venous 7.41 7.32 - 7.43 pH    PCO2 Venous 43 40 - 50 mm Hg    PO2 Venous 27 25 - 47 mm Hg    Bicarbonate Venous 27 21 - 28 mmol/L    FIO2 21     Oxyhemoglobin Venous 45 %    Base Excess Venous 2.3 mmol/L   Glucose by meter    Collection Time: 02/23/20  5:14 PM   Result Value Ref Range    Glucose 188 (H) 70 - 99 mg/dL   Blood gas venous with oxyhemoglobin (PCU Collect TBD)    Collection Time: 02/23/20  7:52 PM   Result Value Ref Range    Ph Venous 7.40 7.32 - 7.43 pH    PCO2 Venous 43 40 - 50 mm Hg    PO2 Venous 31 25 - 47 mm Hg    Bicarbonate Venous 27 21 - 28 mmol/L    FIO2 21     Oxyhemoglobin Venous 54 %    Base Excess Venous 1.6 mmol/L   Glucose by meter     Collection Time: 02/23/20  7:52 PM   Result Value Ref Range    Glucose 199 (H) 70 - 99 mg/dL   Blood gas arterial with oxyhemoglobin (PCU Collect TBD)    Collection Time: 02/23/20  7:53 PM   Result Value Ref Range    pH Arterial 7.47 (H) 7.35 - 7.45 pH    pCO2 Arterial 37 35 - 45 mm Hg    pO2 Arterial 81 80 - 105 mm Hg    Bicarbonate Arterial 27 21 - 28 mmol/L    FIO2 21     Oxyhemoglobin Arterial 96 92 - 100 %    Base Excess Art 3.0 mmol/L   Basic metabolic panel    Collection Time: 02/24/20  3:45 AM   Result Value Ref Range    Sodium 140 133 - 144 mmol/L    Potassium 3.5 3.4 - 5.3 mmol/L    Chloride 107 94 - 109 mmol/L    Carbon Dioxide 30 20 - 32 mmol/L    Anion Gap 4 3 - 14 mmol/L    Glucose 252 (H) 70 - 99 mg/dL    Urea Nitrogen 49 (H) 7 - 30 mg/dL    Creatinine 1.21 0.66 - 1.25 mg/dL    GFR Estimate 62 >60 mL/min/[1.73_m2]    GFR Estimate If Black 72 >60 mL/min/[1.73_m2]    Calcium 7.0 (L) 8.5 - 10.1 mg/dL   Partial thromboplastin time    Collection Time: 02/24/20  3:45 AM   Result Value Ref Range    PTT 52 (H) 22 - 37 sec   CBC with platelets    Collection Time: 02/24/20  3:45 AM   Result Value Ref Range    WBC 17.0 (H) 4.0 - 11.0 10e9/L    RBC Count 3.31 (L) 4.4 - 5.9 10e12/L    Hemoglobin 9.6 (L) 13.3 - 17.7 g/dL    Hematocrit 30.9 (L) 40.0 - 53.0 %    MCV 93 78 - 100 fl    MCH 29.0 26.5 - 33.0 pg    MCHC 31.1 (L) 31.5 - 36.5 g/dL    RDW 17.2 (H) 10.0 - 15.0 %    Platelet Count 372 150 - 450 10e9/L   Factor 10 chromogenic    Collection Time: 02/24/20  3:45 AM   Result Value Ref Range    Chromogenic Factor 10 107 70 - 130 %   INR    Collection Time: 02/24/20  3:45 AM   Result Value Ref Range    INR 1.43 (H) 0.86 - 1.14   Blood gas arterial with oxyhemoglobin (PCU Collect TBD)    Collection Time: 02/24/20  3:45 AM   Result Value Ref Range    pH Arterial 7.46 (H) 7.35 - 7.45 pH    pCO2 Arterial 42 35 - 45 mm Hg    pO2 Arterial 123 (H) 80 - 105 mm Hg    Bicarbonate Arterial 30 (H) 21 - 28 mmol/L    FIO2 2L      Oxyhemoglobin Arterial 97 92 - 100 %    Base Excess Art 5.3 mmol/L   Blood gas venous with oxyhemoglobin (PCU Collect TBD)    Collection Time: 02/24/20  3:45 AM   Result Value Ref Range    Ph Venous 7.41 7.32 - 7.43 pH    PCO2 Venous 44 40 - 50 mm Hg    PO2 Venous 31 25 - 47 mm Hg    Bicarbonate Venous 28 21 - 28 mmol/L    FIO2 2L     Oxyhemoglobin Venous 55 %    Base Excess Venous 2.9 mmol/L   Glucose by meter    Collection Time: 02/24/20  3:50 AM   Result Value Ref Range    Glucose 191 (H) 70 - 99 mg/dL   Blood gas venous with oxyhemoglobin (PCU Collect TBD)    Collection Time: 02/24/20  8:20 AM   Result Value Ref Range    Ph Venous 7.40 7.32 - 7.43 pH    PCO2 Venous 41 40 - 50 mm Hg    PO2 Venous 28 25 - 47 mm Hg    Bicarbonate Venous 25 21 - 28 mmol/L    FIO2 820     Oxyhemoglobin Venous 46 %    Base Excess Venous 0.5 mmol/L   Potassium    Collection Time: 02/24/20  8:20 AM   Result Value Ref Range    Potassium 3.4 3.4 - 5.3 mmol/L   Magnesium    Collection Time: 02/24/20  8:20 AM   Result Value Ref Range    Magnesium 1.8 1.6 - 2.3 mg/dL   Lactic acid whole blood    Collection Time: 02/24/20  8:20 AM   Result Value Ref Range    Lactic Acid 0.7 0.7 - 2.0 mmol/L   Hepatic panel    Collection Time: 02/24/20  8:20 AM   Result Value Ref Range    Bilirubin Direct 0.1 0.0 - 0.2 mg/dL    Bilirubin Total 0.2 0.2 - 1.3 mg/dL    Albumin 1.8 (L) 3.4 - 5.0 g/dL    Protein Total 5.1 (L) 6.8 - 8.8 g/dL    Alkaline Phosphatase 80 40 - 150 U/L    ALT 19 0 - 70 U/L    AST 31 0 - 45 U/L   Glucose by meter    Collection Time: 02/24/20  8:30 AM   Result Value Ref Range    Glucose 172 (H) 70 - 99 mg/dL   Glucose by meter    Collection Time: 02/24/20 12:30 PM   Result Value Ref Range    Glucose 202 (H) 70 - 99 mg/dL       Recent Results (from the past 24 hour(s))   Echocardiogram Complete    Narrative    [id number]  [id number]  [id number]  [id number]           [facility]  Laboratory  [address] [place] [address]     Name: WOLFGANG LEACH  MRN: [id number]  : 1955  Study Date: 2020 10:06 AM  Age: 64 yrs  Gender: Male  Patient Location: [facility]  Reason For Study: Heart Failure  Ordering Physician: NAHUN GUTIERREZ  Referring Physician: SELF, REFERRED  Performed By: Yvonne Adan RDCS     BSA: 2.2 m2  Height: 67 in  Weight: 244 lb  HR: 103  BP: 72/52 mmHg  _____________________________________________________________________________  __        Procedure  Complete Portable Echo Adult. Contrast Optison. Optison (NDC #4180-2457-64)  given intravenously. Patient was given 6 ml mixture of 3 ml Optison and 6 ml  saline. 3 ml wasted.  _____________________________________________________________________________  __        Interpretation Summary  Left ventricular size is normal.  LVEF 20% based on biplane 2D tracing.  Mild to moderate right ventricular dilation is present.  Global right ventricular function is moderately reduced.  Pulmonary artery systolic pressure is normal.  Dilation of the inferior vena cava is present with abnormal respiratory  variation in diameter.  _____________________________________________________________________________  __        Left Ventricle  Left ventricular size is normal. LVEF 20% based on biplane 2D tracing. Left  ventricular wall thickness is normal. Diastolic function not assessed due to  frequent ectopy. Abnormal septal motion consistent with left bundle branch  block is present.     Right Ventricle  Mild to moderate right ventricular dilation is present. Global right  ventricular function is moderately reduced.     Atria  Mild biatrial enlargement is present.     Mitral Valve  Moderate mitral insufficiency is present.        Aortic Valve  Aortic valve is normal in structure and function.     Tricuspid Valve  Moderate tricuspid insufficiency is present. The right ventricular systolic  pressure is approximated at 24.4 mmHg plus the  right atrial pressure.  Pulmonary artery systolic pressure is normal.     Pulmonic Valve  The pulmonic valve cannot be assessed. Mild pulmonic insufficiency is present.     Vessels  The aorta root is normal. The pulmonary artery cannot be assessed. Dilation of  the inferior vena cava is present with abnormal respiratory variation in  diameter.     Compared to Previous Study  Previous study not available for comparison.     _____________________________________________________________________________  __  MMode/2D Measurements & Calculations  IVSd: 0.61 cm  LVIDd: 4.7 cm  LVIDs: 3.7 cm  LVPWd: 0.75 cm  FS: 21.7 %  LV mass(C)d: 99.1 grams  LV mass(C)dI: 45.0 grams/m2     Ao root diam: 2.9 cm  asc Aorta Diam: 2.5 cm  LVOT diam: 1.9 cm  LVOT area: 2.8 cm2     EF(MOD-bp): 21.5 %  LA Volume (BP): 84.8 ml  LA Volume Index (BP): 38.5 ml/m2  RWT: 0.32        Doppler Measurements & Calculations  PA acc time: 0.09 sec     PI end-d saumya: 154.0 cm/sec  TR max saumya: 247.0 cm/sec  TR max P.4 mmHg     _____________________________________________________________________________  __           Report approved by: Graciela Tomlinson 2020 12:05 PM      XR Chest Port 1 View    Narrative    XR CHEST PORT 1 VW  2020 4:21 PM      HISTORY: SG Placement    COMPARISON: None available    FINDINGS: Single AP chest radiograph. Ti[place]-Chucky catheter tip is in the  proximal right main pulmonary artery. Right central line tip and right  upper extremity PICC line tip at the lower SVC. Trachea is midline,  cardiomegaly. Bilateral perihilar streaky opacities. Mild increased  interstitial opacities in the right lung with ill-defined pulmonary  vascularity. No pneumothorax or pleural effusion. No acute osseous or  abdominal abnormality. Elevated left hemidiaphragm.      Impression    IMPRESSION:  1. Tioga-Chucky catheter tip at the proximal right main pulmonary artery.  2. Cardiomegaly with mild pulmonary edema, especially on the right.    I have  personally reviewed the examination and initial interpretation  and I agree with the findings.    DONG SOLORIO MD   Cardiac Catheterization    Narrative      Successful insertion of a IABP in the RFA                Medications     Current Facility-Administered Medications   Medication     acetaminophen (TYLENOL) tablet 650 mg     albuterol (PROAIR HFA/PROVENTIL HFA/VENTOLIN HFA) 108 (90 Base) MCG/ACT inhaler 2 puff     allopurinol (ZYLOPRIM) tablet 200 mg     amiodarone (PACERONE) tablet 400 mg    Followed by     [START ON 3/8/2020] amiodarone (PACERONE) tablet 200 mg     ampicillin (OMNIPEN) 2 g vial to attach to  ml bag     aspirin (ASA) chewable tablet 81 mg     atorvastatin (LIPITOR) tablet 20 mg     bivalirudin (ANGIOMAX) 250 mg in sodium chloride 0.9 % 250 mL ANTICOAGULANT infusion     calcium carbonate (TUMS) chewable tablet 500 mg     cefTRIAXone (ROCEPHIN) 2 g vial to attach to  ml bag for ADULTS or NS 50 ml bag for PEDS     co-enzyme Q-10 capsule 200 mg     dextrose 10% infusion     glucose gel 15-30 g    Or     dextrose 50 % injection 25-50 mL    Or     glucagon injection 1 mg     diclofenac (VOLTAREN) 1 % topical gel 4 g     DOBUTamine (DOBUTREX) 1,000 mg in D5W 250 mL infusion (adult max conc)     EPINEPHrine (ADRENALIN) 5 mg in sodium chloride 0.9 % 250 mL infusion     fentaNYL (PF) (SUBLIMAZE) injection 25 mcg     FLUoxetine (PROzac) solution 20 mg     furosemide (LASIX) injection 80 mg     gabapentin (NEURONTIN) capsule 100 mg     hydrALAZINE (APRESOLINE) tablet 50 mg     HYDROmorphone (DILAUDID) tablet 2 mg     insulin aspart (NovoLOG) injection (RAPID ACTING)     insulin aspart (NovoLOG) injection (RAPID ACTING)     insulin aspart (NovoLOG) injection (RAPID ACTING)     lidocaine (LMX4) cream     lidocaine (PF) (XYLOCAINE) 1 % injection 10 mL     lidocaine 1 % 0.1-1 mL     magnesium oxide (MAG-OX) tablet 400 mg     magnesium sulfate 2 g in water intermittent infusion      magnesium sulfate 4 g in 100 mL sterile water (premade)     medication instruction     melatonin tablet 3 mg     methocarbamol (ROBAXIN) tablet 500 mg     multivitamins w/minerals (CERTAVITE) liquid 15 mL     naloxone (NARCAN) injection 0.1-0.4 mg     pantoprazole (PROTONIX) 2 mg/mL suspension 40 mg     polyethylene glycol (MIRALAX) Packet 17 g     potassium chloride (KLOR-CON) Packet 20-40 mEq     potassium chloride 10 mEq in 100 mL intermittent infusion with 10 mg lidocaine     potassium chloride 10 mEq in 100 mL sterile water intermittent infusion (premix)     potassium chloride 20 mEq in 50 mL intermittent infusion     potassium chloride ER (KLOR-CON M) CR tablet 20-40 mEq     potassium phosphate 10 mmol in D5W 250 mL intermittent infusion     potassium phosphate 15 mmol in D5W 250 mL intermittent infusion     potassium phosphate 20 mmol in D5W 250 mL intermittent infusion     potassium phosphate 20 mmol in D5W 500 mL intermittent infusion     potassium phosphate 25 mmol in D5W 500 mL intermittent infusion     QUEtiapine (SEROquel) tablet 25 mg     Reason beta blocker order not selected     senna-docusate (SENOKOT-S/PERICOLACE) 8.6-50 MG per tablet 1 tablet    Or     senna-docusate (SENOKOT-S/PERICOLACE) 8.6-50 MG per tablet 2 tablet     sodium chloride (PF) 0.9% PF flush 3 mL     sodium chloride (PF) 0.9% PF flush 3 mL     Warfarin Therapy Reminder (Check START DATE - warfarin may be starting in the FUTURE)

## 2020-02-24 NOTE — PROGRESS NOTES
CLINICAL NUTRITION SERVICES - REASSESSMENT NOTE     Nutrition Prescription    Recommendations:  Maintain FT/nutrition support until pt consuming >60% nutrition needs (ie 1100 kcal and 90 g protein daily).    Malnutrition Status:    Does not meet criteria for malnutrition     Interventions by Registered Dietitian (RD):  - Calorie counts (2/24 - 2/26) to evaluate intake.   - Provided cafeteria passes to promote adequate intake.   - Discontinued thiamine supplementation post-LVAD  - Decreased to 12 hr TF cycle duration to promote appetite     Impact Peptide @ 80 ml/hr x 12 hrs (6pm-6am) to provide 1440 kcals (19 kcal/kg/day), 90 g PRO (1.2 g/kg/day), 739 ml free H2O, 61 g Fat (50% from MCTs), 134 g CHO and no Fiber daily. This provides 95% energy needs and 80% protein needs.     Future Recommendations:  If pt consuming >60% nutrition needs, may remove feeding tube and discontinue nutrition support.        EVALUATION OF THE PROGRESS TOWARD GOALS   Diet: Dysphagia diet level 3 with thin liquids   Nutrition Support: Impact Peptide @ 80 ml/hr x 15 hrs (5pm-8am) via NDT to provide 24 kcal/kg/day and 1.5 g protein/kg/day.   Intake: Fair appetite, typically eating % of 3 meals/day (see diet recall below). Though pt states he is not usually a picky eater, he is frustrated with food quality. States this is preventing him from adequate intake. Tolerating overnight cyclic feeds.         B: cheerios with milk, sugar and OJ   L: baked cod, mashed potatoes with gravy    D: ham & cheese sandwich with vegetable broth    Snacks/suppl: Boost plus (once daily)     NEW FINDINGS   Labs: Hypophosphatemia  Meds: Certavite, co-Q10 (200 mg) and thiamine (100 mg)   Wt trends: Remains above driest weight of 212 lbs (96 kg).     MALNUTRITION  % Intake: Decreased intake does not meet criteria, on cycled TF   % Weight Loss: Difficult to assess with fluctuation in fluid status   Subcutaneous Fat Loss: None observed  Muscle Loss: None  observed  Fluid Accumulation/Edema: Does not meet criteria  Malnutrition Diagnosis: Patient does not meet two of the established criteria necessary for diagnosing malnutrition    Previous Goals   Patient to consume % of nutritionally adequate meal trays TID, or the equivalent with supplements/snacks.  Evaluation: Not met consistently     Previous Nutrition Diagnosis  Predicted inadequate nutrient intake (protein/kcal)   Evaluation: Declined     CURRENT NUTRITION DIAGNOSIS  Inadequate oral intake r/t reduced intake 2/2 dysphagia diet modifications as evidenced by pt report, diet recall meeting <75% nutrition needs.     INTERVENTIONS  Implementation  Nutrition education - Discussed nutrition concerns and goals, reviewed menu and supplement options, provided cafeteria passes  Collaboration with other providers - ICU rounds   Enteral Nutrition - Modified     Goals  Pt to consume >60% nutrition needs (ie 1100 kcal and 90 g protein daily).     Monitoring/Evaluation  Progress toward goals will be monitored and evaluated per protocol.    Marisela Bhandari RD, LD  [id number]  Pgr: [phone]

## 2020-02-24 NOTE — PLAN OF CARE
Major Shift Events:  pt c/o increased pain this shift, received order for dilaudid 2mg po q4h for pain. Converted into Afib 110-130, given amio bolus 75mg. PA 60/28, cvp 20. Mds aware, given lasix 80mg IV for CVP. TF at goal 80 ml/h. SvO2 55, CI 2.3. voiding in urinal.  Plan: continue to monitor PA pressures, fluid balance. Update care team as necessary.   For vital signs and complete assessments, please see documentation flowsheets.

## 2020-02-25 ENCOUNTER — APPOINTMENT (OUTPATIENT)
Dept: SPEECH THERAPY | Facility: CLINIC | Age: 65
DRG: 001 | End: 2020-02-25
Attending: INTERNAL MEDICINE
Payer: MEDICARE

## 2020-02-25 ENCOUNTER — APPOINTMENT (OUTPATIENT)
Dept: PHYSICAL THERAPY | Facility: CLINIC | Age: 65
DRG: 001 | End: 2020-02-25
Attending: INTERNAL MEDICINE
Payer: MEDICARE

## 2020-02-25 ENCOUNTER — APPOINTMENT (OUTPATIENT)
Dept: CARDIOLOGY | Facility: CLINIC | Age: 65
DRG: 001 | End: 2020-02-25
Attending: STUDENT IN AN ORGANIZED HEALTH CARE EDUCATION/TRAINING PROGRAM
Payer: MEDICARE

## 2020-02-25 ENCOUNTER — APPOINTMENT (OUTPATIENT)
Dept: OCCUPATIONAL THERAPY | Facility: CLINIC | Age: 65
DRG: 001 | End: 2020-02-25
Attending: INTERNAL MEDICINE
Payer: MEDICARE

## 2020-02-25 ENCOUNTER — APPOINTMENT (OUTPATIENT)
Dept: GENERAL RADIOLOGY | Facility: CLINIC | Age: 65
DRG: 001 | End: 2020-02-25
Attending: STUDENT IN AN ORGANIZED HEALTH CARE EDUCATION/TRAINING PROGRAM
Payer: MEDICARE

## 2020-02-25 LAB
ANION GAP SERPL CALCULATED.3IONS-SCNC: 4 MMOL/L (ref 3–14)
ANION GAP SERPL CALCULATED.3IONS-SCNC: 6 MMOL/L (ref 3–14)
APTT PPP: 51 SEC (ref 22–37)
BASE EXCESS BLDV CALC-SCNC: 10.3 MMOL/L
BASE EXCESS BLDV CALC-SCNC: 4.4 MMOL/L
BASE EXCESS BLDV CALC-SCNC: 5.2 MMOL/L
BASE EXCESS BLDV CALC-SCNC: 7.5 MMOL/L
BASE EXCESS BLDV CALC-SCNC: 7.8 MMOL/L
BASE EXCESS BLDV CALC-SCNC: 7.9 MMOL/L
BASE EXCESS BLDV CALC-SCNC: 8.8 MMOL/L
BUN SERPL-MCNC: 41 MG/DL (ref 7–30)
BUN SERPL-MCNC: 46 MG/DL (ref 7–30)
CALCIUM SERPL-MCNC: 7 MG/DL (ref 8.5–10.1)
CALCIUM SERPL-MCNC: 7.4 MG/DL (ref 8.5–10.1)
CHLORIDE SERPL-SCNC: 101 MMOL/L (ref 94–109)
CHLORIDE SERPL-SCNC: 105 MMOL/L (ref 94–109)
CO2 SERPL-SCNC: 30 MMOL/L (ref 20–32)
CO2 SERPL-SCNC: 32 MMOL/L (ref 20–32)
CREAT SERPL-MCNC: 1.02 MG/DL (ref 0.66–1.25)
CREAT SERPL-MCNC: 1.11 MG/DL (ref 0.66–1.25)
ERYTHROCYTE [DISTWIDTH] IN BLOOD BY AUTOMATED COUNT: 17.9 % (ref 10–15)
ERYTHROCYTE [DISTWIDTH] IN BLOOD BY AUTOMATED COUNT: 18.2 % (ref 10–15)
FACT X ACT/NOR PPP CHRO: 96 % (ref 70–130)
GFR SERPL CREATININE-BSD FRML MDRD: 69 ML/MIN/{1.73_M2}
GFR SERPL CREATININE-BSD FRML MDRD: 76 ML/MIN/{1.73_M2}
GLUCOSE BLDC GLUCOMTR-MCNC: 200 MG/DL (ref 70–99)
GLUCOSE BLDC GLUCOMTR-MCNC: 203 MG/DL (ref 70–99)
GLUCOSE BLDC GLUCOMTR-MCNC: 214 MG/DL (ref 70–99)
GLUCOSE BLDC GLUCOMTR-MCNC: 221 MG/DL (ref 70–99)
GLUCOSE SERPL-MCNC: 213 MG/DL (ref 70–99)
GLUCOSE SERPL-MCNC: 250 MG/DL (ref 70–99)
HCO3 BLDV-SCNC: 29 MMOL/L (ref 21–28)
HCO3 BLDV-SCNC: 30 MMOL/L (ref 21–28)
HCO3 BLDV-SCNC: 32 MMOL/L (ref 21–28)
HCO3 BLDV-SCNC: 33 MMOL/L (ref 21–28)
HCO3 BLDV-SCNC: 34 MMOL/L (ref 21–28)
HCO3 BLDV-SCNC: 34 MMOL/L (ref 21–28)
HCO3 BLDV-SCNC: 35 MMOL/L (ref 21–28)
HCT VFR BLD AUTO: 29.7 % (ref 40–53)
HCT VFR BLD AUTO: 30.4 % (ref 40–53)
HGB BLD-MCNC: 9.1 G/DL (ref 13.3–17.7)
HGB BLD-MCNC: 9.3 G/DL (ref 13.3–17.7)
INR PPP: 1.45 (ref 0.86–1.14)
MAGNESIUM SERPL-MCNC: 2 MG/DL (ref 1.6–2.3)
MCH RBC QN AUTO: 29 PG (ref 26.5–33)
MCH RBC QN AUTO: 29.1 PG (ref 26.5–33)
MCHC RBC AUTO-ENTMCNC: 30.6 G/DL (ref 31.5–36.5)
MCHC RBC AUTO-ENTMCNC: 30.6 G/DL (ref 31.5–36.5)
MCV RBC AUTO: 95 FL (ref 78–100)
MCV RBC AUTO: 95 FL (ref 78–100)
O2/TOTAL GAS SETTING VFR VENT: 21 %
O2/TOTAL GAS SETTING VFR VENT: ABNORMAL %
O2/TOTAL GAS SETTING VFR VENT: ABNORMAL %
OXYHGB MFR BLDV: 37 %
OXYHGB MFR BLDV: 44 %
OXYHGB MFR BLDV: 45 %
OXYHGB MFR BLDV: 46 %
OXYHGB MFR BLDV: 47 %
OXYHGB MFR BLDV: 51 %
OXYHGB MFR BLDV: 51 %
PCO2 BLDV: 42 MM HG (ref 40–50)
PCO2 BLDV: 43 MM HG (ref 40–50)
PCO2 BLDV: 43 MM HG (ref 40–50)
PCO2 BLDV: 49 MM HG (ref 40–50)
PCO2 BLDV: 49 MM HG (ref 40–50)
PCO2 BLDV: 51 MM HG (ref 40–50)
PCO2 BLDV: 52 MM HG (ref 40–50)
PH BLDV: 7.42 PH (ref 7.32–7.43)
PH BLDV: 7.43 PH (ref 7.32–7.43)
PH BLDV: 7.44 PH (ref 7.32–7.43)
PH BLDV: 7.45 PH (ref 7.32–7.43)
PH BLDV: 7.45 PH (ref 7.32–7.43)
PH BLDV: 7.46 PH (ref 7.32–7.43)
PH BLDV: 7.48 PH (ref 7.32–7.43)
PHOSPHATE SERPL-MCNC: 2.4 MG/DL (ref 2.5–4.5)
PLATELET # BLD AUTO: 352 10E9/L (ref 150–450)
PLATELET # BLD AUTO: 373 10E9/L (ref 150–450)
PO2 BLDV: 23 MM HG (ref 25–47)
PO2 BLDV: 26 MM HG (ref 25–47)
PO2 BLDV: 26 MM HG (ref 25–47)
PO2 BLDV: 27 MM HG (ref 25–47)
PO2 BLDV: 28 MM HG (ref 25–47)
PO2 BLDV: 28 MM HG (ref 25–47)
PO2 BLDV: 30 MM HG (ref 25–47)
POTASSIUM BLD-SCNC: 4.1 MMOL/L (ref 3.4–5.3)
POTASSIUM SERPL-SCNC: 3.3 MMOL/L (ref 3.4–5.3)
POTASSIUM SERPL-SCNC: 3.6 MMOL/L (ref 3.4–5.3)
POTASSIUM SERPL-SCNC: 3.8 MMOL/L (ref 3.4–5.3)
RBC # BLD AUTO: 3.13 10E12/L (ref 4.4–5.9)
RBC # BLD AUTO: 3.21 10E12/L (ref 4.4–5.9)
SODIUM SERPL-SCNC: 138 MMOL/L (ref 133–144)
SODIUM SERPL-SCNC: 139 MMOL/L (ref 133–144)
WBC # BLD AUTO: 12.6 10E9/L (ref 4–11)
WBC # BLD AUTO: 12.7 10E9/L (ref 4–11)

## 2020-02-25 PROCEDURE — 93308 TTE F-UP OR LMTD: CPT | Mod: 26 | Performed by: INTERNAL MEDICINE

## 2020-02-25 PROCEDURE — 85027 COMPLETE CBC AUTOMATED: CPT | Performed by: INTERNAL MEDICINE

## 2020-02-25 PROCEDURE — 93321 DOPPLER ECHO F-UP/LMTD STD: CPT | Mod: 26 | Performed by: INTERNAL MEDICINE

## 2020-02-25 PROCEDURE — 25000132 ZZH RX MED GY IP 250 OP 250 PS 637: Mod: GY | Performed by: STUDENT IN AN ORGANIZED HEALTH CARE EDUCATION/TRAINING PROGRAM

## 2020-02-25 PROCEDURE — 99291 CRITICAL CARE FIRST HOUR: CPT | Mod: 25 | Performed by: INTERNAL MEDICINE

## 2020-02-25 PROCEDURE — 84100 ASSAY OF PHOSPHORUS: CPT | Performed by: INTERNAL MEDICINE

## 2020-02-25 PROCEDURE — 71045 X-RAY EXAM CHEST 1 VIEW: CPT

## 2020-02-25 PROCEDURE — 00000146 ZZHCL STATISTIC GLUCOSE BY METER IP

## 2020-02-25 PROCEDURE — 92526 ORAL FUNCTION THERAPY: CPT | Mod: GN

## 2020-02-25 PROCEDURE — 25000128 H RX IP 250 OP 636: Performed by: STUDENT IN AN ORGANIZED HEALTH CARE EDUCATION/TRAINING PROGRAM

## 2020-02-25 PROCEDURE — 85730 THROMBOPLASTIN TIME PARTIAL: CPT | Performed by: INTERNAL MEDICINE

## 2020-02-25 PROCEDURE — 93010 ELECTROCARDIOGRAM REPORT: CPT | Performed by: INTERNAL MEDICINE

## 2020-02-25 PROCEDURE — 25000132 ZZH RX MED GY IP 250 OP 250 PS 637: Mod: GY | Performed by: THORACIC SURGERY (CARDIOTHORACIC VASCULAR SURGERY)

## 2020-02-25 PROCEDURE — 97535 SELF CARE MNGMENT TRAINING: CPT | Mod: GO

## 2020-02-25 PROCEDURE — 25800030 ZZH RX IP 258 OP 636: Performed by: STUDENT IN AN ORGANIZED HEALTH CARE EDUCATION/TRAINING PROGRAM

## 2020-02-25 PROCEDURE — 20000004 ZZH R&B ICU UMMC

## 2020-02-25 PROCEDURE — 93005 ELECTROCARDIOGRAM TRACING: CPT

## 2020-02-25 PROCEDURE — 84132 ASSAY OF SERUM POTASSIUM: CPT | Performed by: STUDENT IN AN ORGANIZED HEALTH CARE EDUCATION/TRAINING PROGRAM

## 2020-02-25 PROCEDURE — 85610 PROTHROMBIN TIME: CPT | Performed by: INTERNAL MEDICINE

## 2020-02-25 PROCEDURE — 80048 BASIC METABOLIC PNL TOTAL CA: CPT | Performed by: INTERNAL MEDICINE

## 2020-02-25 PROCEDURE — 82805 BLOOD GASES W/O2 SATURATION: CPT | Performed by: STUDENT IN AN ORGANIZED HEALTH CARE EDUCATION/TRAINING PROGRAM

## 2020-02-25 PROCEDURE — 93321 DOPPLER ECHO F-UP/LMTD STD: CPT

## 2020-02-25 PROCEDURE — 85260 CLOT FACTOR X STUART-POWER: CPT | Performed by: INTERNAL MEDICINE

## 2020-02-25 PROCEDURE — 83735 ASSAY OF MAGNESIUM: CPT | Performed by: INTERNAL MEDICINE

## 2020-02-25 PROCEDURE — 97530 THERAPEUTIC ACTIVITIES: CPT | Mod: GP

## 2020-02-25 PROCEDURE — 25000125 ZZHC RX 250: Performed by: STUDENT IN AN ORGANIZED HEALTH CARE EDUCATION/TRAINING PROGRAM

## 2020-02-25 PROCEDURE — 84132 ASSAY OF SERUM POTASSIUM: CPT | Performed by: THORACIC SURGERY (CARDIOTHORACIC VASCULAR SURGERY)

## 2020-02-25 PROCEDURE — 93325 DOPPLER ECHO COLOR FLOW MAPG: CPT | Mod: 26 | Performed by: INTERNAL MEDICINE

## 2020-02-25 PROCEDURE — 97110 THERAPEUTIC EXERCISES: CPT | Mod: GO

## 2020-02-25 RX ORDER — METHOCARBAMOL 500 MG/1
500 TABLET, FILM COATED ORAL 4 TIMES DAILY PRN
Status: DISCONTINUED | OUTPATIENT
Start: 2020-02-25 | End: 2020-03-20 | Stop reason: HOSPADM

## 2020-02-25 RX ORDER — DIGOXIN 125 MCG
125 TABLET ORAL DAILY
Status: DISCONTINUED | OUTPATIENT
Start: 2020-02-25 | End: 2020-03-20 | Stop reason: HOSPADM

## 2020-02-25 RX ORDER — FUROSEMIDE 10 MG/ML
120 INJECTION INTRAMUSCULAR; INTRAVENOUS ONCE
Status: COMPLETED | OUTPATIENT
Start: 2020-02-25 | End: 2020-02-25

## 2020-02-25 RX ORDER — HYDRALAZINE HYDROCHLORIDE 25 MG/1
25 TABLET, FILM COATED ORAL ONCE
Status: COMPLETED | OUTPATIENT
Start: 2020-02-25 | End: 2020-02-25

## 2020-02-25 RX ORDER — DEXTROSE MONOHYDRATE 25 G/50ML
25-50 INJECTION, SOLUTION INTRAVENOUS
Status: DISCONTINUED | OUTPATIENT
Start: 2020-02-25 | End: 2020-02-25

## 2020-02-25 RX ORDER — HYDRALAZINE HYDROCHLORIDE 100 MG/1
100 TABLET, FILM COATED ORAL EVERY 8 HOURS SCHEDULED
Status: DISCONTINUED | OUTPATIENT
Start: 2020-02-25 | End: 2020-03-04

## 2020-02-25 RX ORDER — NICOTINE POLACRILEX 4 MG
15-30 LOZENGE BUCCAL
Status: DISCONTINUED | OUTPATIENT
Start: 2020-02-25 | End: 2020-02-25

## 2020-02-25 RX ORDER — WARFARIN SODIUM 7.5 MG/1
7.5 TABLET ORAL
Status: COMPLETED | OUTPATIENT
Start: 2020-02-25 | End: 2020-02-25

## 2020-02-25 RX ADMIN — GABAPENTIN 100 MG: 100 CAPSULE ORAL at 21:01

## 2020-02-25 RX ADMIN — POTASSIUM CHLORIDE 20 MEQ: 750 TABLET, EXTENDED RELEASE ORAL at 18:56

## 2020-02-25 RX ADMIN — MAGNESIUM OXIDE 400 MG: 400 TABLET ORAL at 08:43

## 2020-02-25 RX ADMIN — CEFTRIAXONE 2 G: 2 INJECTION, POWDER, FOR SOLUTION INTRAMUSCULAR; INTRAVENOUS at 22:53

## 2020-02-25 RX ADMIN — AMPICILLIN SODIUM 2 G: 2 INJECTION, POWDER, FOR SOLUTION INTRAMUSCULAR; INTRAVENOUS at 08:45

## 2020-02-25 RX ADMIN — POTASSIUM CHLORIDE 20 MEQ: 750 TABLET, EXTENDED RELEASE ORAL at 16:54

## 2020-02-25 RX ADMIN — CEFTRIAXONE 2 G: 2 INJECTION, POWDER, FOR SOLUTION INTRAMUSCULAR; INTRAVENOUS at 11:09

## 2020-02-25 RX ADMIN — BIVALIRUDIN 0.02 MG/KG/HR: 250 INJECTION, POWDER, LYOPHILIZED, FOR SOLUTION INTRAVENOUS at 21:01

## 2020-02-25 RX ADMIN — GABAPENTIN 100 MG: 100 CAPSULE ORAL at 08:42

## 2020-02-25 RX ADMIN — HYDRALAZINE HYDROCHLORIDE 100 MG: 100 TABLET, FILM COATED ORAL at 21:00

## 2020-02-25 RX ADMIN — METHOCARBAMOL 500 MG: 500 TABLET, FILM COATED ORAL at 08:43

## 2020-02-25 RX ADMIN — FUROSEMIDE 120 MG: 10 INJECTION, SOLUTION INTRAVENOUS at 11:09

## 2020-02-25 RX ADMIN — INSULIN ASPART 3 UNITS: 100 INJECTION, SOLUTION INTRAVENOUS; SUBCUTANEOUS at 17:33

## 2020-02-25 RX ADMIN — AMPICILLIN SODIUM 2 G: 2 INJECTION, POWDER, FOR SOLUTION INTRAMUSCULAR; INTRAVENOUS at 20:43

## 2020-02-25 RX ADMIN — AMIODARONE HYDROCHLORIDE 400 MG: 200 TABLET ORAL at 08:43

## 2020-02-25 RX ADMIN — HYDRALAZINE HYDROCHLORIDE 100 MG: 100 TABLET, FILM COATED ORAL at 14:37

## 2020-02-25 RX ADMIN — POTASSIUM CHLORIDE 20 MEQ: 750 TABLET, EXTENDED RELEASE ORAL at 22:58

## 2020-02-25 RX ADMIN — HYDRALAZINE HYDROCHLORIDE 25 MG: 25 TABLET, FILM COATED ORAL at 08:42

## 2020-02-25 RX ADMIN — AMPICILLIN SODIUM 2 G: 2 INJECTION, POWDER, FOR SOLUTION INTRAMUSCULAR; INTRAVENOUS at 14:37

## 2020-02-25 RX ADMIN — DIGOXIN 125 MCG: 125 TABLET ORAL at 16:54

## 2020-02-25 RX ADMIN — GABAPENTIN 100 MG: 100 CAPSULE ORAL at 14:37

## 2020-02-25 RX ADMIN — INSULIN ASPART 3 UNITS: 100 INJECTION, SOLUTION INTRAVENOUS; SUBCUTANEOUS at 21:09

## 2020-02-25 RX ADMIN — ASPIRIN 81 MG CHEWABLE TABLET 81 MG: 81 TABLET CHEWABLE at 08:42

## 2020-02-25 RX ADMIN — AMPICILLIN SODIUM 2 G: 2 INJECTION, POWDER, FOR SOLUTION INTRAMUSCULAR; INTRAVENOUS at 02:00

## 2020-02-25 RX ADMIN — HYDRALAZINE HYDROCHLORIDE 75 MG: 50 TABLET, FILM COATED ORAL at 05:49

## 2020-02-25 RX ADMIN — MULTIVITAMIN 15 ML: LIQUID ORAL at 08:42

## 2020-02-25 RX ADMIN — ACETAMINOPHEN 650 MG: 325 TABLET, FILM COATED ORAL at 06:12

## 2020-02-25 RX ADMIN — FUROSEMIDE 10 MG/HR: 10 INJECTION, SOLUTION INTRAVENOUS at 12:32

## 2020-02-25 RX ADMIN — FUROSEMIDE 80 MG: 10 INJECTION, SOLUTION INTRAVENOUS at 08:42

## 2020-02-25 RX ADMIN — Medication 40 MG: at 09:03

## 2020-02-25 RX ADMIN — ALTEPLASE 1 MG: 2.2 INJECTION, POWDER, LYOPHILIZED, FOR SOLUTION INTRAVENOUS at 18:56

## 2020-02-25 RX ADMIN — FLUOXETINE HYDROCHLORIDE 20 MG: 20 LIQUID ORAL at 09:04

## 2020-02-25 RX ADMIN — ALLOPURINOL 200 MG: 100 TABLET ORAL at 08:43

## 2020-02-25 RX ADMIN — MAGNESIUM SULFATE 2 G: 2 INJECTION INTRAVENOUS at 05:49

## 2020-02-25 RX ADMIN — POTASSIUM CHLORIDE 20 MEQ: 750 TABLET, EXTENDED RELEASE ORAL at 05:49

## 2020-02-25 RX ADMIN — ATORVASTATIN CALCIUM 20 MG: 20 TABLET, FILM COATED ORAL at 21:01

## 2020-02-25 RX ADMIN — WARFARIN SODIUM 7.5 MG: 7.5 TABLET ORAL at 17:33

## 2020-02-25 RX ADMIN — POTASSIUM PHOSPHATE, MONOBASIC AND POTASSIUM PHOSPHATE, DIBASIC 15 MMOL: 224; 236 INJECTION, SOLUTION INTRAVENOUS at 18:03

## 2020-02-25 RX ADMIN — Medication 200 MG: at 08:43

## 2020-02-25 ASSESSMENT — ACTIVITIES OF DAILY LIVING (ADL)
ADLS_ACUITY_SCORE: 16
ADLS_ACUITY_SCORE: 15
ADLS_ACUITY_SCORE: 15
ADLS_ACUITY_SCORE: 16
ADLS_ACUITY_SCORE: 15
ADLS_ACUITY_SCORE: 15

## 2020-02-25 ASSESSMENT — ENCOUNTER SYMPTOMS: FEVER: 1

## 2020-02-25 ASSESSMENT — MIFFLIN-ST. JEOR: SCORE: 1775.63

## 2020-02-25 ASSESSMENT — PAIN DESCRIPTION - DESCRIPTORS: DESCRIPTORS: ACHING

## 2020-02-25 NOTE — PLAN OF CARE
ICU End of Shift Summary. See flowsheets for vital signs and detailed assessment.    Changes this shift:   - A fib, HR remained 110-130s. BP 90/70s, MAP 90s. MD aware of no changes in HR/BP. Dobutamine (no changes). PA 46/24, SVR 1600s, CVP 15. CI 1.9, Oxyhgb 46.  - urinating without difficulty, large outputs, K+ 3.8, replaced this AM   - mag 2.0, replaced per protocol   - changed VAD dressing at midnight    Plan:    Possibly discontinue dobutamine? Tube feeds?  VAD education  PT/OT?    - continue plan of care

## 2020-02-25 NOTE — PROGRESS NOTES
Calorie Count  Intake recorded for: 2/24  Total Kcals: 356 Total Protein: 10g  Kcals from Hospital Food: 356  Protein: 10g  Kcals from Outside Food (average):0 Protein: 0g  # Meals Recorded: 100% cheerios w/ milk & sugar, orange juice, popsicle   # Supplements Recorded: 0

## 2020-02-25 NOTE — PROGRESS NOTES
CVTS Progress Note        CO-MORBIDITIES:   Patient Active Problem List   Diagnosis     Acute on chronic systolic congestive heart failure (H)     Anxiety     CKD (chronic kidney disease) stage 3, GFR 30-59 ml/min (H)     Coronary artery disease involving native coronary artery of native heart without angina pectoris     GERD (gastroesophageal reflux disease)     Gout     Hyperlipidemia with target LDL less than 100     Nonischemic cardiomyopathy (H)     Non-nephrotic range proteinuria     ABHINAV on CPAP     Type 2 diabetes mellitus with stage 3 chronic kidney disease, without long-term current use of insulin (H)     Heart failure (H)     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 Redd on 2/18 for chronic systolic heart failure 2/2 NICM. Extubated 2/21.    PLAN FOR TODAY:  120mg lasix, 10mg/hr   Increase speed to 5600  Hydrazine 100 Tid   Turn off dobutamine later today if able  Continue amio   Increase bival   Leave swan  Remove art line   Wound vac remove today      PLAN:  Neuro/ pain/ sedation:  - Monitor neurological status. Notify the MD for any acute changes in exam.  -dilaudid, tylenol, gabapentin, robaxin   - continue home fluoxetine    Pulmonary care:   #COPD  - extubated 2/21  - ABHINAV on CPAP, CPAP overnight ordered    Cardiovascular:    #Moderate CAD ([facility] 11/2019 nonobstructive CAD w/ severe branch disease)  #CHF EF 15-20% s/p LVAD placement  #Vfib this admission, s/p amio load  - Monitor hemodynamic status  -  Stop dobutamine  - Hold PTA entresto, spironolactone  - continue ASA 81 mg, atorvastatin  - continue amio 400 mg BID  -  hydralazine 100 mg TID    GI care:   - dysphagia 3 diet  - NJ for TF, will remove today if eating goes well    Fluids/ Electrolytes/ Nutrition:   - TKO for IV fluid hydration  - No indication for parenteral nutrition    Renal/ Fluid  Balance:    #CKD III  - Urine output is adequate so far.  - Will continue to monitor intake and output.  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     Patient seen, findings and plan discussed with CVTS fellow    Loreto Holcomb MD   Anesthesia Resident       ====================================  Subjective:  PA pressure increasing overnight. Given 1 dose of 80 of lasix at 5pm that decreased PA pressures. CXR improved after receiving diuresis.     Patient is feeling better this morning. Intermittent sleep patterns. No other complaints.         PAST MEDICAL HISTORY: No past medical history on file.    PAST SURGICAL HISTORY:   Past Surgical History:   Procedure Laterality Date     CV CENTRAL VENOUS CATHETER PLACEMENT N/A 2/13/2020    Procedure: Central Venous Catheter Placement;  Surgeon: Chente Moss MD;  Location:  HEART CARDIAC CATH LAB     CV INTRA-AORTIC BALLOON PUMP INSERTION N/A 2/7/2020    Procedure: Intra-Aortic Balloon Pump Insertion;  Surgeon: Jose Baldwin MD;  Location: [facility] CARDIAC CATH LAB     CV INTRA-AORTIC BALLOON PUMP INSERTION N/A 2/13/2020    Procedure: Intra-Aortic Balloon Pump Insertion;  Surgeon: Chente Moss MD;  Location: [facility] CARDIAC CATH LAB     CV SWAN LUCIANA PROCEDURE N/A 2/13/2020    Procedure: [place] Luciana Procedure;   Surgeon: Chente Moss MD;  Location:  HEART CARDIAC CATH LAB     INSERT VENTRICULAR ASSIST DEVICE LEFT (HEARTMATE II) N/A 2/18/2020    Procedure: INSERTION, LEFT VENTRICULAR ASSIST DEVICE (HEARTMATE III);  Surgeon: Mac Jaramillo MD;  Location:  OR       FAMILY HISTORY: No family history on file.    SOCIAL HISTORY:   Social History     Tobacco Use     Smoking status: Not on file   Substance Use Topics     Alcohol use: Not on file         OBJECTIVE:   1. VITAL SIGNS:   Temp:  [97.8  F (36.6  C)-98.5  F (36.9  C)] 97.9  F (36.6  C)  Heart Rate:  [106-140] 134  Resp:  [16-20] 20  BP: ()/(33-98) 121/98  MAP:  [73 mmHg-106 mmHg] 91 mmHg  Arterial Line BP: ()/(63-94) 97/87  SpO2:  [83 %-100 %] 100 %    Resp: 20        2. INTAKE/ OUTPUT:   I/O last 3 completed shifts:  In: 3414.5 [P.O.:800; I.V.:1054.5; NG/GT:520]  Out: 4425 [Urine:3725; Chest Tube:700]    3. PHYSICAL EXAMINATION:   General: NAD, laying in hospital bed  Neuro: no focal deficit, follows commands in all 4 extremities  Resp: non labored breathing on RA  CV: RRR  Abdomen: Soft, Non-distended, Non-tender  Incisions: c/d/i  Extremities: warm and well perfused, 1+ edema    4. INVESTIGATIONS:   Arterial Blood Gases     Recent Labs   Lab 02/25/20  0324 02/25/20  0028  02/24/20  1539  02/24/20  0345   WBC 12.7*  --   --  18.0*  --  17.0*   HGB 9.1*  --   --  9.0*  --  9.6*     --   --  374  --  372   INR 1.45*  --   --   --   --  1.43*     --   --  140  --  140   POTASSIUM 3.8 4.1   < > 3.3*   < > 3.5   BUN 46*  --   --  42*  --  49*   CR 1.11  --   --  0.97  --  1.21    < > = values in this interval not displayed.           5. RADIOLOGY:     =========================================    Patient seen and examined. Investigations reviewed. Agree with plan to wean inotropes and continue with diuresis. I agree with the findings outlined in the resident's note. I spent a total of 25 minutes examining the patient,  reviewing investigations and therapeutic counseling.

## 2020-02-25 NOTE — PLAN OF CARE
Discharge Planner SLP   Patient plan for discharge: Did not discuss  Current status: Pt tolerated a variety of advanced soft and regular items from his wife's breakfast from the cafeteria. Pt has a dislike of the hospital food and is also somewhat limited now by the recommendation for softer diet. Chin tuck use most of the time, so significant difference noted with or without chin tuck. Throat clear intermittent, which appeared mostly to follow solids - educated pt on alternating solids and liquids at a 1:1 ratio and double swallow per solid bolus.     Recommend regular solids and thin liquids. Pt to chair for meals or with head of bed at 90 degrees. Small bites/sips, slow rate of intake, alternate solids and liquids, double swallow per bolus. Chin tuck with liquids.     Barriers to return to prior living situation: Below baseline  Recommendations for discharge: Rehab  Rationale for recommendations: SLP at next level of care given swallow function is below baseline          Entered by: Negar Davila 02/25/2020 9:58 AM

## 2020-02-25 NOTE — PROGRESS NOTES
LVAD Social Work Services Progress Note      Date of Initial Social Work Evaluation: 02/10/2020  Collaborated with: Pt and Pt's wife (Veronique) at bedside    Data: Pt s/p LVAD 02/18. Pt much improved since last week - hoping to transfer to  soon. Writer to provide supportive visit. Writer also made aware of [place] apartment availability and informed Pt and family. Pt and family to begin VAD education tomorrow.  Intervention: Supportive visit  Assessment: Pt coping appropriately. Experiencing some general discomfort today though encouraged by progress made in the past week. Pt was excited that he was able to see granddaughter (Mer) this weekend. Writer briefly discussed current recommendation for rehab. Pt agreeable but motivated to continue progress in order to transition straight to [place].     Writer briefly reviewed [place] move in process with Pt's wife and informed her that Amy, accommodations specialist, will call her today to complete. Pt's wife has informed Pt's daughter (Mayra) as she anticipates to arrive from [place] on Friday. Pt's wife now connected with physical therapy services at [facility]. Eager to attend support group this afternoon.  Education provided by SW: [place] move-in process, availability of support groups, ongoing SW support  Plan:    Discharge Plans in Progress: None    Barriers to d/c plan: Medical readiness    Follow up Plan: Ongoing SW support as needed

## 2020-02-25 NOTE — PROGRESS NOTES
Advanced Heart Failure Consult Progress Note  Eliseo Tanner MRN: [id number]  Age: 64 year old, : 1955  Date: 2020              Assessment and Plan:     Mr Eliseo Tanner is a 63yo M w/PMHx notable for chronic systolic heart failure, NICM, moderate CAD, HTN, ABHINAV on CPAP, DM2, CKDIII who is transferred to [facility] for evaluation and management of advanced heart failure with arrhythmia now s/p HM 3 on .    Today's plan ()  -Stop dobutamine, increase LVAD speed to 5600  -Keep SG catheter, hemodynamics Q6   -Lasix 120 mg IV bolus, then ggt at 10 mg/hr  -Increase hydralazine 100 mg po TID     Moderate CAD  Severe Mitral regurgitation  Chronic nonischemic systolic heart failure w/ reduced EF (15-20%) and exacerbation  NYHA Class III. Echo 2020: LVEF 15-20%; LVEDd 5.9 cm; 4+ MR. C 2019 w/ nonobstructive CAD w/ severe branch disease. Presented with increased wt gain, SOB, LE edema concerning for HF exacerbation, initially concerning for cardiogenic shock. Admitted to [facility] for further evaluation regarding need for advanced HF therapies. Patient is now s/p HM III placement on  with early post-op course complicated by bleeding that is slowing down as of  AM.   -Trend Hbg q6h and transfuse for Hgb <7.0  -Monitor fibrinogen, INR, platelets  -Continue ASA 81, atorvastatin 20 mg  -Stop dobutamine  -Keep HD internal jugular catheter, hemodynamics Q6  -Continue coumadin with goal INR 2-3   -Increased speed to 5600 on LVAD in AM  -Lasix 120 mg IV bolus, then ggt at 10 mg/hr  -Increase hydralazine 100 mg po TID      Proteus and Enterococcus bacteremia  Organisms growing from 2/2 blood cultures from . Blood cultured from  NGTD and 2/15 growing 1/2 S.epi, likely contaminant per ID.  ID consulted, appreciate help. Will decide on final duration of abx.  -daily blood cultures until negative  -Zosyn (-)  -Tobramycin (-)  -Vancomycin  "(2/13-2/17  -Meropenem (2/14-2/15)  -Ceftriaxone (2/16- for 2 weeks after swan removed (TBD)  -Ampicillin (2/16  for 2 weeks after swan removed (TBD)    #Thrombocytopenia:  Concern for HIT given timing with respect to heparin exposure. HIT positive DAVINA negative. Antibody is now negative x2, thus no further indication for PLAX  -Heme consulted  -Bivalirudin gtt after LVAD surgery,goal chromogenic level 20-40     VFib requiring shock  Shocked x 3 prior to transfer, loaded with amio. No known prior history. Suspect related to underlying HF.   -Keep K>4, Mg>2  -Amio 400 mg PO QD  -Need ICD for secondary prevention      CODE: FULL CODE     Stanford Acuna M.D.  Cardiology Fellow              Subjective/Interval Events     No acute overnight events. This AM, patient denying pain, SOB, CP, lightheadedness although reported that he has been having difficulty sleeping           Objective     BP (!) 121/98 (BP Location: Left arm)   Pulse 128   Temp 98.6  F (37  C) (Oral)   Resp (!) 31   Ht 1.702 m (5' 7\")   Wt 103.7 kg (228 lb 9.9 oz)   SpO2 94%   BMI 35.81 kg/m    Temp:  [97.9  F (36.6  C)-98.6  F (37  C)] 98.6  F (37  C)  Heart Rate:  [106-140] 137  Resp:  [16-33] 31  BP: ()/(33-98) 121/98  MAP:  [73 mmHg-106 mmHg] 97 mmHg  Arterial Line BP: ()/(63-96) 103/92  SpO2:  [83 %-100 %] 94 %  Wt Readings from Last 2 Encounters:   02/25/20 103.7 kg (228 lb 9.9 oz)     I/O last 3 completed shifts:  In: 3414.5 [P.O.:800; I.V.:1054.5; NG/GT:520]  Out: 4425 [Urine:3725; Chest Tube:700]      Gen: No acute distress  HEENT: NC/AT, +JVD   PULM/THORAX: CTAB  CV: normal rate, regular rhythm, normal S1 and S2, LVAD hum  ABD: Soft, NTND, bowel sounds present, no masses  EXT: WWP. Trace LE edema, clubbing or cyanosis.  NEURO: A&Ox3                Data:     Recent Results (from the past 24 hour(s))   Basic metabolic panel    Collection Time: 02/24/20  3:39 PM   Result Value Ref Range    Sodium 140 133 - 144 mmol/L    Potassium " 3.3 (L) 3.4 - 5.3 mmol/L    Chloride 108 94 - 109 mmol/L    Carbon Dioxide 28 20 - 32 mmol/L    Anion Gap 4 3 - 14 mmol/L    Glucose 151 (H) 70 - 99 mg/dL    Urea Nitrogen 42 (H) 7 - 30 mg/dL    Creatinine 0.97 0.66 - 1.25 mg/dL    GFR Estimate 81 >60 mL/min/[1.73_m2]    GFR Estimate If Black >90 >60 mL/min/[1.73_m2]    Calcium 6.4 (L) 8.5 - 10.1 mg/dL   CBC with platelets    Collection Time: 02/24/20  3:39 PM   Result Value Ref Range    WBC 18.0 (H) 4.0 - 11.0 10e9/L    RBC Count 3.16 (L) 4.4 - 5.9 10e12/L    Hemoglobin 9.0 (L) 13.3 - 17.7 g/dL    Hematocrit 29.9 (L) 40.0 - 53.0 %    MCV 95 78 - 100 fl    MCH 28.5 26.5 - 33.0 pg    MCHC 30.1 (L) 31.5 - 36.5 g/dL    RDW 17.4 (H) 10.0 - 15.0 %    Platelet Count 374 150 - 450 10e9/L   Blood gas venous and oxyhgb    Collection Time: 02/24/20  3:39 PM   Result Value Ref Range    Ph Venous 7.45 (H) 7.32 - 7.43 pH    PCO2 Venous 39 (L) 40 - 50 mm Hg    PO2 Venous 28 25 - 47 mm Hg    Bicarbonate Venous 27 21 - 28 mmol/L    FIO2 21     Oxyhemoglobin Venous 50 %    Base Excess Venous 3.0 mmol/L   Glucose by meter    Collection Time: 02/24/20  3:45 PM   Result Value Ref Range    Glucose 128 (H) 70 - 99 mg/dL   Blood gas venous with oxyhemoglobin (PCU Collect TBD)    Collection Time: 02/24/20  8:25 PM   Result Value Ref Range    Ph Venous 7.45 (H) 7.32 - 7.43 pH    PCO2 Venous 47 40 - 50 mm Hg    PO2 Venous 29 25 - 47 mm Hg    Bicarbonate Venous 33 (H) 21 - 28 mmol/L    FIO2 21     Oxyhemoglobin Venous 52 %    Base Excess Venous 7.5 mmol/L   Potassium whole blood    Collection Time: 02/24/20  8:25 PM   Result Value Ref Range    Potassium 4.4 3.4 - 5.3 mmol/L   Magnesium    Collection Time: 02/24/20  8:29 PM   Result Value Ref Range    Magnesium 2.0 1.6 - 2.3 mg/dL   Glucose by meter    Collection Time: 02/24/20  8:45 PM   Result Value Ref Range    Glucose 185 (H) 70 - 99 mg/dL   Glucose by meter    Collection Time: 02/24/20 10:16 PM   Result Value Ref Range    Glucose 236 (H)  70 - 99 mg/dL   Blood gas venous with oxyhemoglobin (PCU Collect TBD)    Collection Time: 02/25/20 12:28 AM   Result Value Ref Range    Ph Venous 7.43 7.32 - 7.43 pH    PCO2 Venous 51 (H) 40 - 50 mm Hg    PO2 Venous 23 (L) 25 - 47 mm Hg    Bicarbonate Venous 33 (H) 21 - 28 mmol/L    FIO2 21     Oxyhemoglobin Venous 37 %    Base Excess Venous 7.5 mmol/L   Potassium whole blood    Collection Time: 02/25/20 12:28 AM   Result Value Ref Range    Potassium 4.1 3.4 - 5.3 mmol/L   Blood gas venous with oxyhemoglobin (PCU Collect TBD)    Collection Time: 02/25/20  1:04 AM   Result Value Ref Range    Ph Venous 7.45 (H) 7.32 - 7.43 pH    PCO2 Venous 49 40 - 50 mm Hg    PO2 Venous 28 25 - 47 mm Hg    Bicarbonate Venous 34 (H) 21 - 28 mmol/L    FIO2 3L     Oxyhemoglobin Venous 47 %    Base Excess Venous 8.8 mmol/L   Basic metabolic panel    Collection Time: 02/25/20  3:24 AM   Result Value Ref Range    Sodium 139 133 - 144 mmol/L    Potassium 3.8 3.4 - 5.3 mmol/L    Chloride 105 94 - 109 mmol/L    Carbon Dioxide 30 20 - 32 mmol/L    Anion Gap 4 3 - 14 mmol/L    Glucose 250 (H) 70 - 99 mg/dL    Urea Nitrogen 46 (H) 7 - 30 mg/dL    Creatinine 1.11 0.66 - 1.25 mg/dL    GFR Estimate 69 >60 mL/min/[1.73_m2]    GFR Estimate If Black 79 >60 mL/min/[1.73_m2]    Calcium 7.0 (L) 8.5 - 10.1 mg/dL   Partial thromboplastin time    Collection Time: 02/25/20  3:24 AM   Result Value Ref Range    PTT 51 (H) 22 - 37 sec   CBC with platelets    Collection Time: 02/25/20  3:24 AM   Result Value Ref Range    WBC 12.7 (H) 4.0 - 11.0 10e9/L    RBC Count 3.13 (L) 4.4 - 5.9 10e12/L    Hemoglobin 9.1 (L) 13.3 - 17.7 g/dL    Hematocrit 29.7 (L) 40.0 - 53.0 %    MCV 95 78 - 100 fl    MCH 29.1 26.5 - 33.0 pg    MCHC 30.6 (L) 31.5 - 36.5 g/dL    RDW 17.9 (H) 10.0 - 15.0 %    Platelet Count 352 150 - 450 10e9/L   Factor 10 chromogenic    Collection Time: 02/25/20  3:24 AM   Result Value Ref Range    Chromogenic Factor 10 96 70 - 130 %   INR    Collection  Time: 20  3:24 AM   Result Value Ref Range    INR 1.45 (H) 0.86 - 1.14   Magnesium    Collection Time: 20  3:24 AM   Result Value Ref Range    Magnesium 2.0 1.6 - 2.3 mg/dL   Blood gas venous with oxyhemoglobin (PCU Collect TBD)    Collection Time: 20  3:24 AM   Result Value Ref Range    Ph Venous 7.42 7.32 - 7.43 pH    PCO2 Venous 52 (H) 40 - 50 mm Hg    PO2 Venous 27 25 - 47 mm Hg    Bicarbonate Venous 34 (H) 21 - 28 mmol/L    FIO2 3L     Oxyhemoglobin Venous 46 %    Base Excess Venous 7.9 mmol/L   EKG 12-lead, tracing only    Collection Time: 20  7:53 AM   Result Value Ref Range    Interpretation ECG Click View Image link to view waveform and result    Blood gas venous with oxyhemoglobin (PCU Collect TBD)    Collection Time: 20  8:01 AM   Result Value Ref Range    Ph Venous 7.44 (H) 7.32 - 7.43 pH    PCO2 Venous 43 40 - 50 mm Hg    PO2 Venous 26 25 - 47 mm Hg    Bicarbonate Venous 29 (H) 21 - 28 mmol/L    FIO2 21     Oxyhemoglobin Venous 45 %    Base Excess Venous 4.4 mmol/L   Glucose by meter    Collection Time: 20  8:07 AM   Result Value Ref Range    Glucose 200 (H) 70 - 99 mg/dL   Glucose by meter    Collection Time: 20 12:07 PM   Result Value Ref Range    Glucose 221 (H) 70 - 99 mg/dL   Blood gas venous with oxyhemoglobin (PCU Collect TBD)    Collection Time: 20 12:08 PM   Result Value Ref Range    Ph Venous 7.45 (H) 7.32 - 7.43 pH    PCO2 Venous 42 40 - 50 mm Hg    PO2 Venous 26 25 - 47 mm Hg    Bicarbonate Venous 30 (H) 21 - 28 mmol/L    FIO2 21     Oxyhemoglobin Venous 44 %    Base Excess Venous 5.2 mmol/L       Recent Results (from the past 24 hour(s))   Echocardiogram Complete    Narrative    [id number]  [id number]  [id number]  [id number]^JIM           [facility]  Echocardiography Laboratory  [address] [place] [id number]     Name: WOLFGANG LEACH  MRN: [id number]  : 1955  Study Date: 2020  10:06 AM  Age: 64 yrs  Gender: Male  Patient Location: [facility]  Reason For Study: Heart Failure  Ordering Physician: NAHUN GUTIERREZ  Referring Physician: SELF, REFERRED  Performed By: Yvonne Adan RDCS     BSA: 2.2 m2  Height: 67 in  Weight: 244 lb  HR: 103  BP: 72/52 mmHg  _____________________________________________________________________________  __        Procedure  Complete Portable Echo Adult. Contrast Optison. Optison (NDC #0648-5448-23)  given intravenously. Patient was given 6 ml mixture of 3 ml Optison and 6 ml  saline. 3 ml wasted.  _____________________________________________________________________________  __        Interpretation Summary  Left ventricular size is normal.  LVEF 20% based on biplane 2D tracing.  Mild to moderate right ventricular dilation is present.  Global right ventricular function is moderately reduced.  Pulmonary artery systolic pressure is normal.  Dilation of the inferior vena cava is present with abnormal respiratory  variation in diameter.  _____________________________________________________________________________  __        Left Ventricle  Left ventricular size is normal. LVEF 20% based on biplane 2D tracing. Left  ventricular wall thickness is normal. Diastolic function not assessed due to  frequent ectopy. Abnormal septal motion consistent with left bundle branch  block is present.     Right Ventricle  Mild to moderate right ventricular dilation is present. Global right  ventricular function is moderately reduced.     Atria  Mild biatrial enlargement is present.     Mitral Valve  Moderate mitral insufficiency is present.        Aortic Valve  Aortic valve is normal in structure and function.     Tricuspid Valve  Moderate tricuspid insufficiency is present. The right ventricular systolic  pressure is approximated at 24.4 mmHg plus the right atrial pressure.  Pulmonary artery systolic pressure is normal.     Pulmonic Valve  The pulmonic valve cannot be assessed. Mild  pulmonic insufficiency is present.     Vessels  The aorta root is normal. The pulmonary artery cannot be assessed. Dilation of  the inferior vena cava is present with abnormal respiratory variation in  diameter.     Compared to Previous Study  Previous study not available for comparison.     _____________________________________________________________________________  __  MMode/2D Measurements & Calculations  IVSd: 0.61 cm  LVIDd: 4.7 cm  LVIDs: 3.7 cm  LVPWd: 0.75 cm  FS: 21.7 %  LV mass(C)d: 99.1 grams  LV mass(C)dI: 45.0 grams/m2     Ao root diam: 2.9 cm  asc Aorta Diam: 2.5 cm  LVOT diam: 1.9 cm  LVOT area: 2.8 cm2     EF(MOD-bp): 21.5 %  LA Volume (BP): 84.8 ml  LA Volume Index (BP): 38.5 ml/m2  RWT: 0.32        Doppler Measurements & Calculations  PA acc time: 0.09 sec     PI end-d saumya: 154.0 cm/sec  TR max saumya: 247.0 cm/sec  TR max P.4 mmHg     _____________________________________________________________________________  __           Report approved by: Graciela Tomlinson 2020 12:05 PM      XR Chest Port 1 View    Narrative    XR CHEST PORT 1 VW  2020 4:21 PM      HISTORY: SG Placement    COMPARISON: None available    FINDINGS: Single AP chest radiograph. [place] catheter tip is in the  proximal right main pulmonary artery. Right central line tip and right  upper extremity PICC line tip at the lower SVC. Trachea is midline,  cardiomegaly. Bilateral perihilar streaky opacities. Mild increased  interstitial opacities in the right lung with ill-defined pulmonary  vascularity. No pneumothorax or pleural effusion. No acute osseous or  abdominal abnormality. Elevated left hemidiaphragm.      Impression    IMPRESSION:  1. [place] catheter tip at the proximal right main pulmonary artery.  2. Cardiomegaly with mild pulmonary edema, especially on the right.    I have personally reviewed the examination and initial interpretation  and I agree with the findings.    DONG SOLORIO MD   Cardiac  Catheterization    Narrative      Successful insertion of a IABP in the RFA                Medications     Current Facility-Administered Medications   Medication     acetaminophen (TYLENOL) tablet 650 mg     albuterol (PROAIR HFA/PROVENTIL HFA/VENTOLIN HFA) 108 (90 Base) MCG/ACT inhaler 2 puff     allopurinol (ZYLOPRIM) tablet 200 mg     amiodarone (PACERONE) tablet 400 mg    Followed by     [START ON 3/8/2020] amiodarone (PACERONE) tablet 200 mg     ampicillin (OMNIPEN) 2 g vial to attach to  ml bag     aspirin (ASA) chewable tablet 81 mg     atorvastatin (LIPITOR) tablet 20 mg     bivalirudin (ANGIOMAX) 250 mg in sodium chloride 0.9 % 250 mL ANTICOAGULANT infusion     calcium carbonate (TUMS) chewable tablet 500 mg     cefTRIAXone (ROCEPHIN) 2 g vial to attach to  ml bag for ADULTS or NS 50 ml bag for PEDS     co-enzyme Q-10 capsule 200 mg     dextrose 10% infusion     glucose gel 15-30 g    Or     dextrose 50 % injection 25-50 mL    Or     glucagon injection 1 mg     diclofenac (VOLTAREN) 1 % topical gel 4 g     DOBUTamine (DOBUTREX) 1,000 mg in D5W 250 mL infusion (adult max conc)     FLUoxetine (PROzac) solution 20 mg     furosemide (LASIX) 500 mg/50mL infusion ADULT MAX CONC     gabapentin (NEURONTIN) capsule 100 mg     hydrALAZINE (APRESOLINE) tablet 100 mg     HYDROmorphone (DILAUDID) tablet 2 mg     insulin aspart (NovoLOG) injection (RAPID ACTING)     insulin aspart (NovoLOG) injection (RAPID ACTING)     lidocaine (LMX4) cream     lidocaine 1 % 0.1-1 mL     magnesium oxide (MAG-OX) tablet 400 mg     magnesium sulfate 2 g in water intermittent infusion     magnesium sulfate 4 g in 100 mL sterile water (premade)     medication instruction     melatonin tablet 3 mg     methocarbamol (ROBAXIN) tablet 500 mg     multivitamins w/minerals (CERTAVITE) liquid 15 mL     naloxone (NARCAN) injection 0.1-0.4 mg     pantoprazole (PROTONIX) 2 mg/mL suspension 40 mg     polyethylene glycol (MIRALAX) Packet 17  g     potassium chloride (KLOR-CON) Packet 20-40 mEq     potassium chloride 10 mEq in 100 mL intermittent infusion with 10 mg lidocaine     potassium chloride 10 mEq in 100 mL sterile water intermittent infusion (premix)     potassium chloride 20 mEq in 50 mL intermittent infusion     potassium chloride ER (KLOR-CON M) CR tablet 20-40 mEq     potassium phosphate 10 mmol in D5W 250 mL intermittent infusion     potassium phosphate 15 mmol in D5W 250 mL intermittent infusion     potassium phosphate 20 mmol in D5W 250 mL intermittent infusion     potassium phosphate 20 mmol in D5W 500 mL intermittent infusion     potassium phosphate 25 mmol in D5W 500 mL intermittent infusion     QUEtiapine (SEROquel) tablet 25 mg     Reason beta blocker order not selected     senna-docusate (SENOKOT-S/PERICOLACE) 8.6-50 MG per tablet 1 tablet    Or     senna-docusate (SENOKOT-S/PERICOLACE) 8.6-50 MG per tablet 2 tablet     sodium chloride (PF) 0.9% PF flush 3 mL     sodium chloride (PF) 0.9% PF flush 3 mL     Warfarin Therapy Reminder (Check START DATE - warfarin may be starting in the FUTURE)

## 2020-02-25 NOTE — PLAN OF CARE
4E Discharge Planner PT   Patient plan for discharge: not discussed today  Current status: HR tachy 133-141 bpm in supine/sitting. Team rounding/per clinical judgement - terminated progression of activity until ECHO is completed. LVAD #s stable. B rolling with cues to assist. Dependent OH ceiling lift from supine > recliner to promote OOB activity, A x 2 for safety and line management. Cues for deep breathing throughout mobility. Recs up with OH ceiling lift.    Barriers to return to prior living situation: medical status, current mob, level of A, weakness/deconditioning, post surgical precautions  Recommendations for discharge: TCU  Rationale for recommendations: to progress functional mobility and ADLs. Pt may progress to ARU pending whether or not pt can tolerate 3 hours of therapy/day.         Entered by: Wenyd Colin 02/25/2020 10:43 AM

## 2020-02-25 NOTE — PROGRESS NOTES
"SPIRITUAL HEALTH SERVICES  SPIRITUAL ASSESSMENT Progress Note  [facility] ([place]) 4E     Follow-up visit with pt and spouse. Pt sitting up in bed, welcomed  visit. Spouse talked about experience of pt's surgery: \"It was scary for us when Eliseo had to go back to the OR, but now things have quieted down...\" Pt said he's fine with feeding tube, that it's not much of a bother, and that he looks forward to getting the [place] catheter out. We also talked about pt's therapies - he is hopeful and optimistic for continued healing. Spouse shared that pt is hopeful that he will be able to get back to riding his motorcycle.     PLAN: continue to follow, visiting at least 2x weekly while pt on ICU.    Ad Krause M.Div. ([place]), [facility]  Staff   Pager [phone]      "

## 2020-02-25 NOTE — PLAN OF CARE
Discharge Planner OT   Patient plan for discharge: goal is to return to [facility] with OP CR   Current status: Pt with L shoulder pain since surgery; pain and limited ROM with active ROM in L shoulder flexion/abduction, no pain with PROM and able to achieve full ROM. Pt tolerated UE exercises seated in chair. Pt min A x2 for standing and commode transfer. Pt tolerated standing ~1.5 min with CGA and FWW. Pt's -140s with activity.   Barriers to return to prior living situation: medical status, pain, post op precautions, weakness, deconditioning   Recommendations for discharge: ARU  Rationale for recommendations: Pt would benefit from intensive, interdisciplinary rehab to address the above stated deficits to facilitate return towards prior level of function. Pt can/will be able to tolerate 3 hours of therapy a day.        Entered by: Miranda Kaufman 02/25/2020 2:31 PM

## 2020-02-26 ENCOUNTER — APPOINTMENT (OUTPATIENT)
Dept: PHYSICAL THERAPY | Facility: CLINIC | Age: 65
DRG: 001 | End: 2020-02-26
Attending: INTERNAL MEDICINE
Payer: MEDICARE

## 2020-02-26 ENCOUNTER — APPOINTMENT (OUTPATIENT)
Dept: SPEECH THERAPY | Facility: CLINIC | Age: 65
DRG: 001 | End: 2020-02-26
Attending: INTERNAL MEDICINE
Payer: MEDICARE

## 2020-02-26 ENCOUNTER — APPOINTMENT (OUTPATIENT)
Dept: GENERAL RADIOLOGY | Facility: CLINIC | Age: 65
DRG: 001 | End: 2020-02-26
Attending: STUDENT IN AN ORGANIZED HEALTH CARE EDUCATION/TRAINING PROGRAM
Payer: MEDICARE

## 2020-02-26 ENCOUNTER — APPOINTMENT (OUTPATIENT)
Dept: CT IMAGING | Facility: CLINIC | Age: 65
DRG: 001 | End: 2020-02-26
Attending: INTERNAL MEDICINE
Payer: MEDICARE

## 2020-02-26 LAB
ABO + RH BLD: NORMAL
ABO + RH BLD: NORMAL
ANION GAP SERPL CALCULATED.3IONS-SCNC: 4 MMOL/L (ref 3–14)
ANION GAP SERPL CALCULATED.3IONS-SCNC: 4 MMOL/L (ref 3–14)
APTT PPP: 49 SEC (ref 22–37)
BASE EXCESS BLDV CALC-SCNC: 10 MMOL/L
BASE EXCESS BLDV CALC-SCNC: 10.3 MMOL/L
BASE EXCESS BLDV CALC-SCNC: 11.4 MMOL/L
BASE EXCESS BLDV CALC-SCNC: 11.8 MMOL/L
BLD GP AB SCN SERPL QL: NORMAL
BLOOD BANK CMNT PATIENT-IMP: NORMAL
BUN SERPL-MCNC: 40 MG/DL (ref 7–30)
BUN SERPL-MCNC: 42 MG/DL (ref 7–30)
BUN SERPL-MCNC: 44 MG/DL (ref 7–30)
CALCIUM SERPL-MCNC: 7.4 MG/DL (ref 8.5–10.1)
CALCIUM SERPL-MCNC: 7.6 MG/DL (ref 8.5–10.1)
CHLORIDE SERPL-SCNC: 100 MMOL/L (ref 94–109)
CHLORIDE SERPL-SCNC: 101 MMOL/L (ref 94–109)
CO2 SERPL-SCNC: 34 MMOL/L (ref 20–32)
CO2 SERPL-SCNC: 37 MMOL/L (ref 20–32)
CREAT SERPL-MCNC: 0.95 MG/DL (ref 0.66–1.25)
CREAT SERPL-MCNC: 1 MG/DL (ref 0.66–1.25)
CREAT SERPL-MCNC: 1.09 MG/DL (ref 0.66–1.25)
ERYTHROCYTE [DISTWIDTH] IN BLOOD BY AUTOMATED COUNT: 18.6 % (ref 10–15)
ERYTHROCYTE [DISTWIDTH] IN BLOOD BY AUTOMATED COUNT: 18.7 % (ref 10–15)
FACT X ACT/NOR PPP CHRO: 91 % (ref 70–130)
GFR SERPL CREATININE-BSD FRML MDRD: 70 ML/MIN/{1.73_M2}
GFR SERPL CREATININE-BSD FRML MDRD: 78 ML/MIN/{1.73_M2}
GFR SERPL CREATININE-BSD FRML MDRD: 83 ML/MIN/{1.73_M2}
GLUCOSE BLDC GLUCOMTR-MCNC: 165 MG/DL (ref 70–99)
GLUCOSE BLDC GLUCOMTR-MCNC: 196 MG/DL (ref 70–99)
GLUCOSE BLDC GLUCOMTR-MCNC: 197 MG/DL (ref 70–99)
GLUCOSE BLDC GLUCOMTR-MCNC: 197 MG/DL (ref 70–99)
GLUCOSE BLDC GLUCOMTR-MCNC: 218 MG/DL (ref 70–99)
GLUCOSE SERPL-MCNC: 214 MG/DL (ref 70–99)
GLUCOSE SERPL-MCNC: 242 MG/DL (ref 70–99)
HCO3 BLDV-SCNC: 35 MMOL/L (ref 21–28)
HCO3 BLDV-SCNC: 35 MMOL/L (ref 21–28)
HCO3 BLDV-SCNC: 37 MMOL/L (ref 21–28)
HCO3 BLDV-SCNC: 37 MMOL/L (ref 21–28)
HCT VFR BLD AUTO: 30 % (ref 40–53)
HCT VFR BLD AUTO: 32 % (ref 40–53)
HGB BLD-MCNC: 9.1 G/DL (ref 13.3–17.7)
HGB BLD-MCNC: 9.9 G/DL (ref 13.3–17.7)
INR PPP: 1.53 (ref 0.86–1.14)
INTERPRETATION ECG - MUSE: NORMAL
MAGNESIUM SERPL-MCNC: 1.8 MG/DL (ref 1.6–2.3)
MCH RBC QN AUTO: 28.8 PG (ref 26.5–33)
MCH RBC QN AUTO: 29.3 PG (ref 26.5–33)
MCHC RBC AUTO-ENTMCNC: 30.3 G/DL (ref 31.5–36.5)
MCHC RBC AUTO-ENTMCNC: 30.9 G/DL (ref 31.5–36.5)
MCV RBC AUTO: 95 FL (ref 78–100)
MCV RBC AUTO: 95 FL (ref 78–100)
O2/TOTAL GAS SETTING VFR VENT: 21 %
O2/TOTAL GAS SETTING VFR VENT: 21 %
O2/TOTAL GAS SETTING VFR VENT: ABNORMAL %
O2/TOTAL GAS SETTING VFR VENT: ABNORMAL %
OXYHGB MFR BLDV: 48 %
OXYHGB MFR BLDV: 49 %
OXYHGB MFR BLDV: 50 %
OXYHGB MFR BLDV: 58 %
PCO2 BLDV: 47 MM HG (ref 40–50)
PCO2 BLDV: 48 MM HG (ref 40–50)
PCO2 BLDV: 52 MM HG (ref 40–50)
PCO2 BLDV: 54 MM HG (ref 40–50)
PH BLDV: 7.45 PH (ref 7.32–7.43)
PH BLDV: 7.46 PH (ref 7.32–7.43)
PH BLDV: 7.47 PH (ref 7.32–7.43)
PH BLDV: 7.48 PH (ref 7.32–7.43)
PHOSPHATE SERPL-MCNC: 3.2 MG/DL (ref 2.5–4.5)
PLATELET # BLD AUTO: 354 10E9/L (ref 150–450)
PLATELET # BLD AUTO: 404 10E9/L (ref 150–450)
PO2 BLDV: 27 MM HG (ref 25–47)
PO2 BLDV: 28 MM HG (ref 25–47)
PO2 BLDV: 29 MM HG (ref 25–47)
PO2 BLDV: 33 MM HG (ref 25–47)
POTASSIUM SERPL-SCNC: 3.3 MMOL/L (ref 3.4–5.3)
POTASSIUM SERPL-SCNC: 3.6 MMOL/L (ref 3.4–5.3)
POTASSIUM SERPL-SCNC: 3.6 MMOL/L (ref 3.4–5.3)
RBC # BLD AUTO: 3.16 10E12/L (ref 4.4–5.9)
RBC # BLD AUTO: 3.38 10E12/L (ref 4.4–5.9)
SODIUM SERPL-SCNC: 139 MMOL/L (ref 133–144)
SODIUM SERPL-SCNC: 140 MMOL/L (ref 133–144)
SPECIMEN EXP DATE BLD: NORMAL
WBC # BLD AUTO: 10.9 10E9/L (ref 4–11)
WBC # BLD AUTO: 12 10E9/L (ref 4–11)

## 2020-02-26 PROCEDURE — 93010 ELECTROCARDIOGRAM REPORT: CPT | Performed by: INTERNAL MEDICINE

## 2020-02-26 PROCEDURE — 27210437 ZZH NUTRITION PRODUCT SEMIELEM INTERMED LITER

## 2020-02-26 PROCEDURE — 25000128 H RX IP 250 OP 636: Performed by: THORACIC SURGERY (CARDIOTHORACIC VASCULAR SURGERY)

## 2020-02-26 PROCEDURE — 92526 ORAL FUNCTION THERAPY: CPT | Mod: GN | Performed by: SPEECH-LANGUAGE PATHOLOGIST

## 2020-02-26 PROCEDURE — 40000141 ZZH STATISTIC PERIPHERAL IV START W/O US GUIDANCE

## 2020-02-26 PROCEDURE — 97530 THERAPEUTIC ACTIVITIES: CPT | Mod: GP | Performed by: PHYSICAL THERAPIST

## 2020-02-26 PROCEDURE — 00000146 ZZHCL STATISTIC GLUCOSE BY METER IP

## 2020-02-26 PROCEDURE — 82805 BLOOD GASES W/O2 SATURATION: CPT | Performed by: STUDENT IN AN ORGANIZED HEALTH CARE EDUCATION/TRAINING PROGRAM

## 2020-02-26 PROCEDURE — 86850 RBC ANTIBODY SCREEN: CPT | Performed by: THORACIC SURGERY (CARDIOTHORACIC VASCULAR SURGERY)

## 2020-02-26 PROCEDURE — 85610 PROTHROMBIN TIME: CPT | Performed by: INTERNAL MEDICINE

## 2020-02-26 PROCEDURE — 80048 BASIC METABOLIC PNL TOTAL CA: CPT | Performed by: INTERNAL MEDICINE

## 2020-02-26 PROCEDURE — 97116 GAIT TRAINING THERAPY: CPT | Mod: GP | Performed by: PHYSICAL THERAPIST

## 2020-02-26 PROCEDURE — 85730 THROMBOPLASTIN TIME PARTIAL: CPT | Performed by: INTERNAL MEDICINE

## 2020-02-26 PROCEDURE — 84520 ASSAY OF UREA NITROGEN: CPT | Performed by: STUDENT IN AN ORGANIZED HEALTH CARE EDUCATION/TRAINING PROGRAM

## 2020-02-26 PROCEDURE — 71045 X-RAY EXAM CHEST 1 VIEW: CPT

## 2020-02-26 PROCEDURE — 25000125 ZZHC RX 250: Performed by: STUDENT IN AN ORGANIZED HEALTH CARE EDUCATION/TRAINING PROGRAM

## 2020-02-26 PROCEDURE — 85260 CLOT FACTOR X STUART-POWER: CPT | Performed by: INTERNAL MEDICINE

## 2020-02-26 PROCEDURE — 25000132 ZZH RX MED GY IP 250 OP 250 PS 637: Mod: GY | Performed by: THORACIC SURGERY (CARDIOTHORACIC VASCULAR SURGERY)

## 2020-02-26 PROCEDURE — 99291 CRITICAL CARE FIRST HOUR: CPT | Mod: GC | Performed by: INTERNAL MEDICINE

## 2020-02-26 PROCEDURE — 86901 BLOOD TYPING SEROLOGIC RH(D): CPT | Performed by: THORACIC SURGERY (CARDIOTHORACIC VASCULAR SURGERY)

## 2020-02-26 PROCEDURE — 84132 ASSAY OF SERUM POTASSIUM: CPT | Performed by: STUDENT IN AN ORGANIZED HEALTH CARE EDUCATION/TRAINING PROGRAM

## 2020-02-26 PROCEDURE — 85027 COMPLETE CBC AUTOMATED: CPT | Performed by: INTERNAL MEDICINE

## 2020-02-26 PROCEDURE — 84100 ASSAY OF PHOSPHORUS: CPT | Performed by: INTERNAL MEDICINE

## 2020-02-26 PROCEDURE — 71260 CT THORAX DX C+: CPT

## 2020-02-26 PROCEDURE — 20000004 ZZH R&B ICU UMMC

## 2020-02-26 PROCEDURE — 86900 BLOOD TYPING SEROLOGIC ABO: CPT | Performed by: THORACIC SURGERY (CARDIOTHORACIC VASCULAR SURGERY)

## 2020-02-26 PROCEDURE — 25000128 H RX IP 250 OP 636: Performed by: STUDENT IN AN ORGANIZED HEALTH CARE EDUCATION/TRAINING PROGRAM

## 2020-02-26 PROCEDURE — 83735 ASSAY OF MAGNESIUM: CPT | Performed by: INTERNAL MEDICINE

## 2020-02-26 PROCEDURE — 25000132 ZZH RX MED GY IP 250 OP 250 PS 637: Mod: GY | Performed by: STUDENT IN AN ORGANIZED HEALTH CARE EDUCATION/TRAINING PROGRAM

## 2020-02-26 PROCEDURE — 93005 ELECTROCARDIOGRAM TRACING: CPT

## 2020-02-26 PROCEDURE — 82565 ASSAY OF CREATININE: CPT | Performed by: STUDENT IN AN ORGANIZED HEALTH CARE EDUCATION/TRAINING PROGRAM

## 2020-02-26 RX ORDER — IOPAMIDOL 755 MG/ML
100 INJECTION, SOLUTION INTRAVASCULAR ONCE
Status: COMPLETED | OUTPATIENT
Start: 2020-02-26 | End: 2020-02-26

## 2020-02-26 RX ORDER — WARFARIN SODIUM 7.5 MG/1
7.5 TABLET ORAL
Status: COMPLETED | OUTPATIENT
Start: 2020-02-26 | End: 2020-02-26

## 2020-02-26 RX ADMIN — HYDRALAZINE HYDROCHLORIDE 100 MG: 100 TABLET, FILM COATED ORAL at 06:12

## 2020-02-26 RX ADMIN — POTASSIUM CHLORIDE 20 MEQ: 750 TABLET, EXTENDED RELEASE ORAL at 16:43

## 2020-02-26 RX ADMIN — FUROSEMIDE 10 MG/HR: 10 INJECTION, SOLUTION INTRAVENOUS at 19:14

## 2020-02-26 RX ADMIN — INSULIN ASPART 4 UNITS: 100 INJECTION, SOLUTION INTRAVENOUS; SUBCUTANEOUS at 17:52

## 2020-02-26 RX ADMIN — AMIODARONE HYDROCHLORIDE 400 MG: 200 TABLET ORAL at 09:05

## 2020-02-26 RX ADMIN — QUETIAPINE FUMARATE 25 MG: 25 TABLET ORAL at 00:04

## 2020-02-26 RX ADMIN — AMPICILLIN SODIUM 2 G: 2 INJECTION, POWDER, FOR SOLUTION INTRAMUSCULAR; INTRAVENOUS at 02:50

## 2020-02-26 RX ADMIN — DIGOXIN 125 MCG: 125 TABLET ORAL at 09:05

## 2020-02-26 RX ADMIN — AMPICILLIN SODIUM 2 G: 2 INJECTION, POWDER, FOR SOLUTION INTRAMUSCULAR; INTRAVENOUS at 14:14

## 2020-02-26 RX ADMIN — MELATONIN TAB 3 MG 3 MG: 3 TAB at 20:35

## 2020-02-26 RX ADMIN — CEFTRIAXONE 2 G: 2 INJECTION, POWDER, FOR SOLUTION INTRAMUSCULAR; INTRAVENOUS at 22:11

## 2020-02-26 RX ADMIN — POTASSIUM CHLORIDE 20 MEQ: 750 TABLET, EXTENDED RELEASE ORAL at 14:58

## 2020-02-26 RX ADMIN — QUETIAPINE FUMARATE 25 MG: 25 TABLET ORAL at 20:35

## 2020-02-26 RX ADMIN — IOPAMIDOL 100 ML: 755 INJECTION, SOLUTION INTRAVENOUS at 19:49

## 2020-02-26 RX ADMIN — AMPICILLIN SODIUM 2 G: 2 INJECTION, POWDER, FOR SOLUTION INTRAMUSCULAR; INTRAVENOUS at 20:33

## 2020-02-26 RX ADMIN — POTASSIUM CHLORIDE 40 MEQ: 750 TABLET, EXTENDED RELEASE ORAL at 20:34

## 2020-02-26 RX ADMIN — GABAPENTIN 100 MG: 100 CAPSULE ORAL at 09:05

## 2020-02-26 RX ADMIN — INSULIN ASPART 2 UNITS: 100 INJECTION, SOLUTION INTRAVENOUS; SUBCUTANEOUS at 22:14

## 2020-02-26 RX ADMIN — AMPICILLIN SODIUM 2 G: 2 INJECTION, POWDER, FOR SOLUTION INTRAMUSCULAR; INTRAVENOUS at 09:06

## 2020-02-26 RX ADMIN — DICLOFENAC 4 G: 10 GEL TOPICAL at 20:34

## 2020-02-26 RX ADMIN — ALLOPURINOL 200 MG: 100 TABLET ORAL at 09:05

## 2020-02-26 RX ADMIN — GABAPENTIN 100 MG: 100 CAPSULE ORAL at 20:34

## 2020-02-26 RX ADMIN — MELATONIN TAB 3 MG 3 MG: 3 TAB at 00:03

## 2020-02-26 RX ADMIN — POTASSIUM CHLORIDE 20 MEQ: 750 TABLET, EXTENDED RELEASE ORAL at 06:12

## 2020-02-26 RX ADMIN — Medication 40 MG: at 09:06

## 2020-02-26 RX ADMIN — ATORVASTATIN CALCIUM 20 MG: 20 TABLET, FILM COATED ORAL at 20:34

## 2020-02-26 RX ADMIN — CEFTRIAXONE 2 G: 2 INJECTION, POWDER, FOR SOLUTION INTRAMUSCULAR; INTRAVENOUS at 10:46

## 2020-02-26 RX ADMIN — INSULIN ASPART 3 UNITS: 100 INJECTION, SOLUTION INTRAVENOUS; SUBCUTANEOUS at 06:15

## 2020-02-26 RX ADMIN — FLUOXETINE HYDROCHLORIDE 20 MG: 20 LIQUID ORAL at 09:06

## 2020-02-26 RX ADMIN — INSULIN ASPART 3 UNITS: 100 INJECTION, SOLUTION INTRAVENOUS; SUBCUTANEOUS at 12:08

## 2020-02-26 RX ADMIN — MAGNESIUM OXIDE 400 MG: 400 TABLET ORAL at 09:05

## 2020-02-26 RX ADMIN — ASPIRIN 81 MG CHEWABLE TABLET 81 MG: 81 TABLET CHEWABLE at 09:05

## 2020-02-26 RX ADMIN — HYDRALAZINE HYDROCHLORIDE 100 MG: 100 TABLET, FILM COATED ORAL at 22:11

## 2020-02-26 RX ADMIN — INSULIN ASPART 3 UNITS: 100 INJECTION, SOLUTION INTRAVENOUS; SUBCUTANEOUS at 03:00

## 2020-02-26 RX ADMIN — WARFARIN SODIUM 7.5 MG: 7.5 TABLET ORAL at 17:53

## 2020-02-26 RX ADMIN — MULTIVITAMIN 15 ML: LIQUID ORAL at 09:05

## 2020-02-26 RX ADMIN — GABAPENTIN 100 MG: 100 CAPSULE ORAL at 14:14

## 2020-02-26 RX ADMIN — Medication 200 MG: at 09:05

## 2020-02-26 RX ADMIN — METHOCARBAMOL 500 MG: 500 TABLET, FILM COATED ORAL at 20:35

## 2020-02-26 ASSESSMENT — ACTIVITIES OF DAILY LIVING (ADL)
ADLS_ACUITY_SCORE: 16

## 2020-02-26 ASSESSMENT — MIFFLIN-ST. JEOR: SCORE: 1754.63

## 2020-02-26 NOTE — PROGRESS NOTES
[facility]  General Infectious Disease Progress Note     Patient:  Eliseo Tanner, Date of birth 1955, Medical record number [id number]  Date of Visit:  February 16, 2020         Assessment and Recommendations:   Problem List:  1. Bacteremia with Proteus mirabilis and Enterococcus faecalis in 2/2 bottles on 2/13. Cultures from 2/14 are negative to date. Urine culture was negative on 2/13 making a urinary source unlikely. Lines were suspected as a source and exchanged. CT C/A/P from 2/8 without any obvious intra-abdominal source.   2. 1/2 Blood cultures on 2/15 growing Staph epi from left hand - likely a contaminate   3. Implant of HM3 left ventricular assist device (intracorporeal left ventricular assist device) - 2/18    Chest was re-opened during the procedure because of concern for bleed    Incisional wound vac in place   4. Vfib arrest  5. Chronic systolic heart failure with exacerbation. Severe mitral regurgitation   6. Type 2 diabetes  7. CKD 3  8. 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 Would recommend continuing Ampicillin for a total of 14 days after the removal of ALL central venous catheters. Mainly because he did not have a line holiday with the enterococcal bacteremia and there is concern that the central venous catheter (despite exchange) could be seeded.    2) Continue Ceftriaxone 2 grams IV BID (total of 14 days - treatment for Proteus bacteremia) END date 2/28  3) Recommend surveillance cultures Qweek x 4 weeks after the stop of ALL abx.     ID will sign off at this time. Please call with questions.     Discussion:   Mr Eliseo Tanner is a 63yo M with a history of chronic systolic heart failure, NICM, moderate CAD, HTN, DM2, CKDIII who was admitted to [facility] on 2/7/20 for evaluation of heart failure. Prior to arrival to the floor he was in Vfib and required shocks x 3. He was being diuresed as expected, and has been  requiring inotropes and a balloon pump. On 2/13 morning had rigors and chills. Blood cultures (2/2) were positive for Proteus mirabilis and Enterococcus. I suspect this is most likely from his lines. Lines all removed and changed on 2/13 evening and balloon pump was also exchanged. His blood cultures from 2/14 remain negative to date. He did have one of two blood cultures from the hand growing Staph epi on 2/15. I believe that this is a contaminate and not likely related to this current episode of bacteremia. Now s/p LVAD placement on 2/18.      [facility]   Infectious Diseases   [id number]        Interval History:   Afebrile. Remains on dobutamine. Tachycardic   White count declining.   Denies any chest pain. No incisional pain. No nausea or vomiting.   No abdominal pain. One loose stool today.   All other complete ROS of negative.          Current Antimicrobials   Vancomycin 2/13-2/16  Ampicillin 2/17-Present   Ceftriaxone 2/15-present  Meropenem 2/14-2/15  Tobramycin x 1 2/13  Pip/tazo 2/13-2/14         Physical Exam:   Ranges for vital signs:  Temp:  [97.7  F (36.5  C)-98.6  F (37  C)] 97.7  F (36.5  C)  Heart Rate:  [113-140] 135  Resp:  [16-33] 29  BP: (110)/(95) 110/95  MAP:  [73 mmHg-114 mmHg] 107 mmHg  Arterial Line BP: ()/() 114/97  SpO2:  [83 %-100 %] 97 %    Exam:  GENERAL: Extubated, awake, alert, no distress  ENT:  Head is normocephalic, atraumatic. place  EYES:  Eyes have anicteric sclerae. PERRL.   NECK:  Supple. No cervical lymphadenopathy  Chest: Wound vac in place, LVAD humm   LUNGS:  Clear  ABDOMEN:  Non-distended.   EXT: Extremities warm and without edema.  SKIN:  No acute rashes.   NEUROLOGIC:  Unable to fully assess at this time          Laboratory Data:     Creatinine   Date Value Ref Range Status   02/25/2020 1.02 0.66 - 1.25 mg/dL Final   02/25/2020 1.11 0.66 - 1.25 mg/dL Final   02/24/2020 0.97 0.66 - 1.25 mg/dL Final   02/24/2020 1.21 0.66 - 1.25 mg/dL Final   02/23/2020 1.38 (H)  0.66 - 1.25 mg/dL Final     WBC   Date Value Ref Range Status   02/25/2020 12.6 (H) 4.0 - 11.0 10e9/L Final   02/25/2020 12.7 (H) 4.0 - 11.0 10e9/L Final   02/24/2020 18.0 (H) 4.0 - 11.0 10e9/L Final   02/24/2020 17.0 (H) 4.0 - 11.0 10e9/L Final   02/23/2020 15.0 (H) 4.0 - 11.0 10e9/L Final     Hemoglobin   Date Value Ref Range Status   02/25/2020 9.3 (L) 13.3 - 17.7 g/dL Final     Platelet Count   Date Value Ref Range Status   02/25/2020 373 150 - 450 10e9/L Final     CRP Inflammation   Date Value Ref Range Status   02/08/2020 21.0 (H) 0.0 - 8.0 mg/L Final     AST   Date Value Ref Range Status   02/24/2020 31 0 - 45 U/L Final   02/23/2020 38 0 - 45 U/L Final   02/22/2020 48 (H) 0 - 45 U/L Final   02/21/2020 79 (H) 0 - 45 U/L Final   02/19/2020 55 (H) 0 - 45 U/L Final     ALT   Date Value Ref Range Status   02/24/2020 19 0 - 70 U/L Final   02/23/2020 21 0 - 70 U/L Final   02/22/2020 22 0 - 70 U/L Final   02/21/2020 22 0 - 70 U/L Final   02/19/2020 19 0 - 70 U/L Final     Bilirubin Total   Date Value Ref Range Status   02/24/2020 0.2 0.2 - 1.3 mg/dL Final   02/23/2020 0.4 0.2 - 1.3 mg/dL Final   02/22/2020 0.5 0.2 - 1.3 mg/dL Final   02/21/2020 0.9 0.2 - 1.3 mg/dL Final   02/19/2020 1.5 (H) 0.2 - 1.3 mg/dL Final     Lab Results   Component Value Date     02/25/2020    BUN 41 (H) 02/25/2020    CO2 32 02/25/2020     Microbiology:     Culture data:  2/22: Blood culture x 2 - NG   2/16: Blood culture x 2 - NG  2/15: Blood culture 1 of 2 growing staph epi  2/14 Blood culture NGTD  2/13 Blood culture: Enterococcus faecalis, sensis pending; Proteus mirabilis, widely sensitive  2/13 Urine culture negative

## 2020-02-26 NOTE — PLAN OF CARE
4E: Discharge Planner PT   Patient plan for discharge: not discussed today   Current status: pt completes supine to sit with mod A, STS and pivot with min A x 2 for line management. Pt engaged in walking forward and backward x 10ft in room with CGA-min A. HR in 130s throughout mobility, some fatigue, pt limited by L shoulder pain.   Barriers to return to prior living situation: medical status, assist for mobility, deconditioning   Recommendations for discharge: ARU  Rationale for recommendations: pt with improved tolerance to mobility, may be good ARU candidate as pt previously IND and motivated to participate, anticipate pt would tolerate 3 hours of daily therapy.       Entered by: Kay Ferrer 02/26/2020 4:47 PM

## 2020-02-26 NOTE — PLAN OF CARE
Discharge Planner SLP   Patient plan for discharge: not discussed today  Current status: Patient up in chair for dysphagia treatment. RN reported no swallowing concerns today. Note slow but functional mastication of solid texture and no oral residue remained. Noted one instance of coughing after final bite of cracker. No other overt aspiration signs occurred. Patient independently tucked chin when swallowing most of the time.     Recommend continue regular diet with thin liquids given swallow strategies (upright position and in chair if possible, small single sips/bites, alternate food/drink, double swallow, slow rate, chin tuck with liquids).    Barriers to return to prior living situation: Below baseline  Recommendations for discharge: Rehab  Rationale for recommendations: SLP at next level of care given swallow function is below baseline        Entered by: Anaid Ardon 02/26/2020 3:20 PM

## 2020-02-26 NOTE — PLAN OF CARE
ICU End of Shift Summary. See flowsheets for vital signs and detailed assessment.    Changes this shift:   - remained in a fib with -130s, v tach runs (lasting a couple of seconds each, throughout the night).   - replaced potassium (3.6), recheck at 1000  - Last JOSE #s: PA 44/16, CVP 10, SVR 940s, CI 2.5  - voiding (200-300 mL/hr)   - 2L FR, pt educated and aware      Plan:    - stop nocturnal TF?  - monitor JOSE, stop dobutamine today?    Continue with plan of care

## 2020-02-26 NOTE — PLAN OF CARE
Major Shift Events:  continues to have high filling pressures, lasix gtt & bolus given. Pt voiding well. -1.5L at the end of my shift. Up to chair majority of day and worked with pt/ot. Continues to have high HR, 110s-130s. Echo done for r/o tamponade with high filling pressures. Wife and patient updated at bedside throughout the day.     Plan: continue on dobutamine & dieresis throughout the night.    For vital signs and complete assessments, please see documentation flowsheets.

## 2020-02-26 NOTE — PROGRESS NOTES
Calorie Count  Intake recorded for: 2/25  Total Kcals: 1175 Total Protein: 45g  Kcals from Hospital Food: 1175  Protein: 45g  Kcals from Outside Food (average):0 Protein: 0g  # Meals Recorded: 100% orange juice, coffee, flatbread, 75% scrambled eggs, 50% sausage kallie  # Supplements Recorded: 100% 1 Boost Pus, 1 Magic Cup

## 2020-02-26 NOTE — PROGRESS NOTES
Advanced Heart Failure Consult Progress Note  Eliseo Tanner MRN: [id number]  Age: 64 year old, : 1955  Date: 2020              Assessment and Plan:     Mr Eliseo Tanner is a 65yo M w/PMHx notable for chronic systolic heart failure, NICM, moderate CAD, HTN, ABHINAV on CPAP, DM2, CKDIII who is transferred to [facility] for evaluation and management of advanced heart failure with arrhythmia now s/p HM 3 on .    Today's plan ():  -Stop dobutamine, LVAD at 5500 RPM  -Hemodynamics in PM, and if CI stable, remove swan catheter  -Continue lasix ggt at 10 ml/hr (goal net negative 2.0 L over next 24 hrs)  -Continue hydralazine 100 mg po TID     Moderate CAD  Severe Mitral regurgitation  Chronic nonischemic systolic heart failure w/ reduced EF (15-20%) and exacerbation  New atrial fibrillation  NYHA Class III. Echo 2020: LVEF 15-20%; LVEDd 5.9 cm; 4+ MR. [facility] 2019 w/ nonobstructive CAD w/ severe branch disease. Presented with increased wt gain, SOB, LE edema concerning for HF exacerbation, initially concerning for cardiogenic shock. Admitted to [facility] for further evaluation regarding need for advanced HF therapies. Patient is now s/p HM III placement on  with early post-op course complicated by bleeding that is slowing down as of  AM.   -Continue ASA 81, atorvastatin 20 mg  -Stop dobutamine 2.5 mcg/kg/min on  AM  -Keep HD internal jugular catheter, hemodynamics Q6  -Continue coumadin with goal INR 2-3 / CFX goal 20-40 (while on Bival)  -LVAD speed at 5500 RPM  -s/p Lasix 120 mg IV bolus on , then ggt at 10 mg/hr (continue as of )  -Hydralazine 100 mg po TID      Proteus and Enterococcus bacteremia  Organisms growing from 2/2 blood cultures from . Blood cultured from  NGTD and 2/15 growing 1/2 S.epi, likely contaminant per ID.  ID consulted, appreciate help. Will decide on final duration of abx.  -daily blood cultures until negative  -Zosyn  "(2/13-2/14)  -Tobramycin (2/13-2/14)  -Vancomycin (2/13-2/17  -Meropenem (2/14-2/15)  -Ceftriaxone (2/16-2/28)  -Ampicillin (2/16-?  for 2 weeks after swan removed (TBD))    #Thrombocytopenia:  Concern for HIT given timing with respect to heparin exposure. HIT positive DAVINA negative. Antibody is now negative x2, thus no further indication for PLAX  -Heme consulted  -Bivalirudin gtt after LVAD surgery,goal chromogenic level 20-40     VFib requiring shock  Shocked x 3 prior to transfer, loaded with amio. No known prior history. Suspect related to underlying HF.   -Keep K>4, Mg>2  -Amio 400 mg PO QD  -Needs ICD for secondary prevention      CODE: FULL CODE     Stanford Acuna M.D.  Cardiology Fellow              Subjective/Interval Events     No acute overnight events. This AM, patient denying pain, SOB, CP, lightheadedness although reported that he has been having difficulty sleeping           Objective     /89   Pulse 112   Temp 97.7  F (36.5  C) (Axillary)   Resp 22   Ht 1.702 m (5' 7\")   Wt 101.6 kg (223 lb 15.8 oz)   SpO2 (!) 85%   BMI 35.08 kg/m    Temp:  [97.7  F (36.5  C)-98.6  F (37  C)] 97.7  F (36.5  C)  Pulse:  [112-135] 112  Heart Rate:  [110-140] 117  Resp:  [22-33] 22  BP: ()/(23-95) 108/89  MAP:  [86 mmHg-114 mmHg] 107 mmHg  Arterial Line BP: ()/() 114/97  SpO2:  [75 %-100 %] 85 %  Wt Readings from Last 2 Encounters:   02/26/20 101.6 kg (223 lb 15.8 oz)     I/O last 3 completed shifts:  In: 5060.29 [P.O.:2730; I.V.:1180.29; NG/GT:190]  Out: 7605 [Urine:6645; Stool:100; Chest Tube:860]      Gen: No acute distress  HEENT: NC/AT, +JVD   PULM/THORAX: CTAB  CV: normal rate, regular rhythm, normal S1 and S2, LVAD hum  ABD: Soft, NTND, bowel sounds present, no masses  EXT: WWP. Trace LE edema, clubbing or cyanosis.  NEURO: A&Ox3                Data:     Recent Results (from the past 24 hour(s))   EKG 12-lead, tracing only    Collection Time: 02/25/20  7:53 AM   Result Value Ref " Range    Interpretation ECG Click View Image link to view waveform and result    Blood gas venous with oxyhemoglobin (PCU Collect TBD)    Collection Time: 02/25/20  8:01 AM   Result Value Ref Range    Ph Venous 7.44 (H) 7.32 - 7.43 pH    PCO2 Venous 43 40 - 50 mm Hg    PO2 Venous 26 25 - 47 mm Hg    Bicarbonate Venous 29 (H) 21 - 28 mmol/L    FIO2 21     Oxyhemoglobin Venous 45 %    Base Excess Venous 4.4 mmol/L   Glucose by meter    Collection Time: 02/25/20  8:07 AM   Result Value Ref Range    Glucose 200 (H) 70 - 99 mg/dL   Glucose by meter    Collection Time: 02/25/20 12:07 PM   Result Value Ref Range    Glucose 221 (H) 70 - 99 mg/dL   Blood gas venous with oxyhemoglobin (PCU Collect TBD)    Collection Time: 02/25/20 12:08 PM   Result Value Ref Range    Ph Venous 7.45 (H) 7.32 - 7.43 pH    PCO2 Venous 42 40 - 50 mm Hg    PO2 Venous 26 25 - 47 mm Hg    Bicarbonate Venous 30 (H) 21 - 28 mmol/L    FIO2 21     Oxyhemoglobin Venous 44 %    Base Excess Venous 5.2 mmol/L   Basic metabolic panel    Collection Time: 02/25/20  3:54 PM   Result Value Ref Range    Sodium 138 133 - 144 mmol/L    Potassium 3.3 (L) 3.4 - 5.3 mmol/L    Chloride 101 94 - 109 mmol/L    Carbon Dioxide 32 20 - 32 mmol/L    Anion Gap 6 3 - 14 mmol/L    Glucose 213 (H) 70 - 99 mg/dL    Urea Nitrogen 41 (H) 7 - 30 mg/dL    Creatinine 1.02 0.66 - 1.25 mg/dL    GFR Estimate 76 >60 mL/min/[1.73_m2]    GFR Estimate If Black 88 >60 mL/min/[1.73_m2]    Calcium 7.4 (L) 8.5 - 10.1 mg/dL   CBC with platelets    Collection Time: 02/25/20  3:54 PM   Result Value Ref Range    WBC 12.6 (H) 4.0 - 11.0 10e9/L    RBC Count 3.21 (L) 4.4 - 5.9 10e12/L    Hemoglobin 9.3 (L) 13.3 - 17.7 g/dL    Hematocrit 30.4 (L) 40.0 - 53.0 %    MCV 95 78 - 100 fl    MCH 29.0 26.5 - 33.0 pg    MCHC 30.6 (L) 31.5 - 36.5 g/dL    RDW 18.2 (H) 10.0 - 15.0 %    Platelet Count 373 150 - 450 10e9/L   Phosphorus    Collection Time: 02/25/20  3:54 PM   Result Value Ref Range    Phosphorus 2.4  (L) 2.5 - 4.5 mg/dL   Blood gas venous with oxyhemoglobin (PCU Collect TBD)    Collection Time: 02/25/20  3:54 PM   Result Value Ref Range    Ph Venous 7.48 (H) 7.32 - 7.43 pH    PCO2 Venous 43 40 - 50 mm Hg    PO2 Venous 28 25 - 47 mm Hg    Bicarbonate Venous 32 (H) 21 - 28 mmol/L    FIO2 21     Oxyhemoglobin Venous 51 %    Base Excess Venous 7.8 mmol/L   Glucose by meter    Collection Time: 02/25/20  5:31 PM   Result Value Ref Range    Glucose 214 (H) 70 - 99 mg/dL   Blood gas venous with oxyhemoglobin (PCU Collect TBD)    Collection Time: 02/25/20  8:51 PM   Result Value Ref Range    Ph Venous 7.46 (H) 7.32 - 7.43 pH    PCO2 Venous 49 40 - 50 mm Hg    PO2 Venous 30 25 - 47 mm Hg    Bicarbonate Venous 35 (H) 21 - 28 mmol/L    FIO2 21     Oxyhemoglobin Venous 51 %    Base Excess Venous 10.3 mmol/L   Glucose by meter    Collection Time: 02/25/20  9:07 PM   Result Value Ref Range    Glucose 203 (H) 70 - 99 mg/dL   Potassium    Collection Time: 02/25/20 10:15 PM   Result Value Ref Range    Potassium 3.6 3.4 - 5.3 mmol/L   Urea nitrogen    Collection Time: 02/26/20 12:07 AM   Result Value Ref Range    Urea Nitrogen 42 (H) 7 - 30 mg/dL   Creatinine    Collection Time: 02/26/20 12:07 AM   Result Value Ref Range    Creatinine 1.00 0.66 - 1.25 mg/dL    GFR Estimate 78 >60 mL/min/[1.73_m2]    GFR Estimate If Black >90 >60 mL/min/[1.73_m2]   Blood gas venous with oxyhemoglobin (PCU Collect TBD)    Collection Time: 02/26/20 12:07 AM   Result Value Ref Range    Ph Venous 7.48 (H) 7.32 - 7.43 pH    PCO2 Venous 47 40 - 50 mm Hg    PO2 Venous 27 25 - 47 mm Hg    Bicarbonate Venous 35 (H) 21 - 28 mmol/L    FIO2 21.0     Oxyhemoglobin Venous 49 %    Base Excess Venous 10.3 mmol/L   Glucose by meter    Collection Time: 02/26/20  2:56 AM   Result Value Ref Range    Glucose 197 (H) 70 - 99 mg/dL   Basic metabolic panel    Collection Time: 02/26/20  4:12 AM   Result Value Ref Range    Sodium 139 133 - 144 mmol/L    Potassium 3.6 3.4 -  5.3 mmol/L    Chloride 101 94 - 109 mmol/L    Carbon Dioxide 34 (H) 20 - 32 mmol/L    Anion Gap 4 3 - 14 mmol/L    Glucose 242 (H) 70 - 99 mg/dL    Urea Nitrogen 44 (H) 7 - 30 mg/dL    Creatinine 0.95 0.66 - 1.25 mg/dL    GFR Estimate 83 >60 mL/min/[1.73_m2]    GFR Estimate If Black >90 >60 mL/min/[1.73_m2]    Calcium 7.4 (L) 8.5 - 10.1 mg/dL   Partial thromboplastin time    Collection Time: 02/26/20  4:12 AM   Result Value Ref Range    PTT 49 (H) 22 - 37 sec   CBC with platelets    Collection Time: 02/26/20  4:12 AM   Result Value Ref Range    WBC 10.9 4.0 - 11.0 10e9/L    RBC Count 3.16 (L) 4.4 - 5.9 10e12/L    Hemoglobin 9.1 (L) 13.3 - 17.7 g/dL    Hematocrit 30.0 (L) 40.0 - 53.0 %    MCV 95 78 - 100 fl    MCH 28.8 26.5 - 33.0 pg    MCHC 30.3 (L) 31.5 - 36.5 g/dL    RDW 18.6 (H) 10.0 - 15.0 %    Platelet Count 354 150 - 450 10e9/L   INR    Collection Time: 02/26/20  4:12 AM   Result Value Ref Range    INR 1.53 (H) 0.86 - 1.14   Blood gas venous with oxyhemoglobin (PCU Collect TBD)    Collection Time: 02/26/20  4:12 AM   Result Value Ref Range    Ph Venous 7.45 (H) 7.32 - 7.43 pH    PCO2 Venous 54 (H) 40 - 50 mm Hg    PO2 Venous 33 25 - 47 mm Hg    Bicarbonate Venous 37 (H) 21 - 28 mmol/L    FIO2 1L     Oxyhemoglobin Venous 58 %    Base Excess Venous 11.8 mmol/L   Type and Screen (AM Draw)    Collection Time: 02/26/20  4:12 AM   Result Value Ref Range    ABO A     RH(D) Pos     Antibody Screen Neg     Test Valid Only At          [facility]    Specimen Expires 02/29/2020    Magnesium    Collection Time: 02/26/20  4:12 AM   Result Value Ref Range    Magnesium 1.8 1.6 - 2.3 mg/dL   Phosphorus    Collection Time: 02/26/20  4:12 AM   Result Value Ref Range    Phosphorus 3.2 2.5 - 4.5 mg/dL   Glucose by meter    Collection Time: 02/26/20  6:14 AM   Result Value Ref Range    Glucose 197 (H) 70 - 99 mg/dL       Recent Results (from the past 24 hour(s))   Echocardiogram  Complete    Narrative    [id number]  [id number]  [id number]  [id number]^JIM           [facility],[place]  Echocardiography Laboratory  [address] [place] [id number]     Name: WOLFGANG LEACH  MRN: [id number]  : 1955  Study Date: 2020 10:06 AM  Age: 64 yrs  Gender: Male  Patient Location: [facility]  Reason For Study: Heart Failure  Ordering Physician: NAHUN GUTIERREZ  Referring Physician: SELF, REFERRED  Performed By: Yvonne Adan RDCS     BSA: 2.2 m2  Height: 67 in  Weight: 244 lb  HR: 103  BP: 72/52 mmHg  _____________________________________________________________________________  __        Procedure  Complete Portable Echo Adult. Contrast Optison. Optison (NDC #0908-6014-53)  given intravenously. Patient was given 6 ml mixture of 3 ml Optison and 6 ml  saline. 3 ml wasted.  _____________________________________________________________________________  __        Interpretation Summary  Left ventricular size is normal.  LVEF 20% based on biplane 2D tracing.  Mild to moderate right ventricular dilation is present.  Global right ventricular function is moderately reduced.  Pulmonary artery systolic pressure is normal.  Dilation of the inferior vena cava is present with abnormal respiratory  variation in diameter.  _____________________________________________________________________________  __        Left Ventricle  Left ventricular size is normal. LVEF 20% based on biplane 2D tracing. Left  ventricular wall thickness is normal. Diastolic function not assessed due to  frequent ectopy. Abnormal septal motion consistent with left bundle branch  block is present.     Right Ventricle  Mild to moderate right ventricular dilation is present. Global right  ventricular function is moderately reduced.     Atria  Mild biatrial enlargement is present.     Mitral Valve  Moderate mitral insufficiency is present.        Aortic Valve  Aortic valve is normal in structure and  function.     Tricuspid Valve  Moderate tricuspid insufficiency is present. The right ventricular systolic  pressure is approximated at 24.4 mmHg plus the right atrial pressure.  Pulmonary artery systolic pressure is normal.     Pulmonic Valve  The pulmonic valve cannot be assessed. Mild pulmonic insufficiency is present.     Vessels  The aorta root is normal. The pulmonary artery cannot be assessed. Dilation of  the inferior vena cava is present with abnormal respiratory variation in  diameter.     Compared to Previous Study  Previous study not available for comparison.     _____________________________________________________________________________  __  MMode/2D Measurements & Calculations  IVSd: 0.61 cm  LVIDd: 4.7 cm  LVIDs: 3.7 cm  LVPWd: 0.75 cm  FS: 21.7 %  LV mass(C)d: 99.1 grams  LV mass(C)dI: 45.0 grams/m2     Ao root diam: 2.9 cm  asc Aorta Diam: 2.5 cm  LVOT diam: 1.9 cm  LVOT area: 2.8 cm2     EF(MOD-bp): 21.5 %  LA Volume (BP): 84.8 ml  LA Volume Index (BP): 38.5 ml/m2  RWT: 0.32        Doppler Measurements & Calculations  PA acc time: 0.09 sec     PI end-d saumya: 154.0 cm/sec  TR max saumya: 247.0 cm/sec  TR max P.4 mmHg     _____________________________________________________________________________  __           Report approved by: Graciela Tomlinson 2020 12:05 PM      XR Chest Port 1 View    Narrative    XR CHEST PORT 1 VW  2020 4:21 PM      HISTORY: SG Placement    COMPARISON: None available    FINDINGS: Single AP chest radiograph. [place] catheter tip is in the  proximal right main pulmonary artery. Right central line tip and right  upper extremity PICC line tip at the lower SVC. Trachea is midline,  cardiomegaly. Bilateral perihilar streaky opacities. Mild increased  interstitial opacities in the right lung with ill-defined pulmonary  vascularity. No pneumothorax or pleural effusion. No acute osseous or  abdominal abnormality. Elevated left hemidiaphragm.      Impression     IMPRESSION:  1. [place]-Chucky catheter tip at the proximal right main pulmonary artery.  2. Cardiomegaly with mild pulmonary edema, especially on the right.    I have personally reviewed the examination and initial interpretation  and I agree with the findings.    DONG SOLORIO MD   Cardiac Catheterization    Narrative      Successful insertion of a IABP in the RFA                Medications     Current Facility-Administered Medications   Medication     acetaminophen (TYLENOL) tablet 650 mg     albuterol (PROAIR HFA/PROVENTIL HFA/VENTOLIN HFA) 108 (90 Base) MCG/ACT inhaler 2 puff     allopurinol (ZYLOPRIM) tablet 200 mg     amiodarone (PACERONE) tablet 400 mg    Followed by     [START ON 3/8/2020] amiodarone (PACERONE) tablet 200 mg     ampicillin (OMNIPEN) 2 g vial to attach to  ml bag     aspirin (ASA) chewable tablet 81 mg     atorvastatin (LIPITOR) tablet 20 mg     bivalirudin (ANGIOMAX) 250 mg in sodium chloride 0.9 % 250 mL ANTICOAGULANT infusion     calcium carbonate (TUMS) chewable tablet 500 mg     cefTRIAXone (ROCEPHIN) 2 g vial to attach to  ml bag for ADULTS or NS 50 ml bag for PEDS     co-enzyme Q-10 capsule 200 mg     dextrose 10% infusion     glucose gel 15-30 g    Or     dextrose 50 % injection 25-50 mL    Or     glucagon injection 1 mg     diclofenac (VOLTAREN) 1 % topical gel 4 g     digoxin (LANOXIN) tablet 125 mcg     DOBUTamine (DOBUTREX) 1,000 mg in D5W 250 mL infusion (adult max conc)     FLUoxetine (PROzac) solution 20 mg     furosemide (LASIX) 500 mg/50mL infusion ADULT MAX CONC     gabapentin (NEURONTIN) capsule 100 mg     hydrALAZINE (APRESOLINE) tablet 100 mg     HYDROmorphone (DILAUDID) tablet 2 mg     insulin aspart (NovoLOG) injection (RAPID ACTING)     insulin aspart (NovoLOG) injection (RAPID ACTING)     insulin glargine (LANTUS PEN) injection 10 Units     lidocaine (LMX4) cream     lidocaine 1 % 0.1-1 mL     magnesium oxide (MAG-OX) tablet 400 mg     magnesium  sulfate 2 g in water intermittent infusion     magnesium sulfate 4 g in 100 mL sterile water (premade)     medication instruction     melatonin tablet 3 mg     methocarbamol (ROBAXIN) tablet 500 mg     multivitamins w/minerals (CERTAVITE) liquid 15 mL     naloxone (NARCAN) injection 0.1-0.4 mg     pantoprazole (PROTONIX) 2 mg/mL suspension 40 mg     polyethylene glycol (MIRALAX) Packet 17 g     potassium chloride (KLOR-CON) Packet 20-40 mEq     potassium chloride 10 mEq in 100 mL intermittent infusion with 10 mg lidocaine     potassium chloride 10 mEq in 100 mL sterile water intermittent infusion (premix)     potassium chloride 20 mEq in 50 mL intermittent infusion     potassium chloride ER (KLOR-CON M) CR tablet 20-40 mEq     potassium phosphate 10 mmol in D5W 250 mL intermittent infusion     potassium phosphate 15 mmol in D5W 250 mL intermittent infusion     potassium phosphate 20 mmol in D5W 250 mL intermittent infusion     potassium phosphate 20 mmol in D5W 500 mL intermittent infusion     potassium phosphate 25 mmol in D5W 500 mL intermittent infusion     QUEtiapine (SEROquel) tablet 25 mg     Reason beta blocker order not selected     senna-docusate (SENOKOT-S/PERICOLACE) 8.6-50 MG per tablet 1 tablet    Or     senna-docusate (SENOKOT-S/PERICOLACE) 8.6-50 MG per tablet 2 tablet     sodium chloride (PF) 0.9% PF flush 3 mL     sodium chloride (PF) 0.9% PF flush 3 mL     Warfarin Therapy Reminder (Check START DATE - warfarin may be starting in the FUTURE)

## 2020-02-27 ENCOUNTER — APPOINTMENT (OUTPATIENT)
Dept: OCCUPATIONAL THERAPY | Facility: CLINIC | Age: 65
DRG: 001 | End: 2020-02-27
Attending: INTERNAL MEDICINE
Payer: MEDICARE

## 2020-02-27 ENCOUNTER — APPOINTMENT (OUTPATIENT)
Dept: GENERAL RADIOLOGY | Facility: CLINIC | Age: 65
DRG: 001 | End: 2020-02-27
Attending: STUDENT IN AN ORGANIZED HEALTH CARE EDUCATION/TRAINING PROGRAM
Payer: MEDICARE

## 2020-02-27 ENCOUNTER — APPOINTMENT (OUTPATIENT)
Dept: PHYSICAL THERAPY | Facility: CLINIC | Age: 65
DRG: 001 | End: 2020-02-27
Attending: INTERNAL MEDICINE
Payer: MEDICARE

## 2020-02-27 LAB
ANION GAP SERPL CALCULATED.3IONS-SCNC: 4 MMOL/L (ref 3–14)
ANION GAP SERPL CALCULATED.3IONS-SCNC: 4 MMOL/L (ref 3–14)
APTT PPP: 65 SEC (ref 22–37)
BUN SERPL-MCNC: 41 MG/DL (ref 7–30)
BUN SERPL-MCNC: 42 MG/DL (ref 7–30)
CALCIUM SERPL-MCNC: 7.8 MG/DL (ref 8.5–10.1)
CALCIUM SERPL-MCNC: 8.1 MG/DL (ref 8.5–10.1)
CHLORIDE SERPL-SCNC: 100 MMOL/L (ref 94–109)
CHLORIDE SERPL-SCNC: 97 MMOL/L (ref 94–109)
CO2 SERPL-SCNC: 36 MMOL/L (ref 20–32)
CO2 SERPL-SCNC: 37 MMOL/L (ref 20–32)
CREAT SERPL-MCNC: 1.11 MG/DL (ref 0.66–1.25)
CREAT SERPL-MCNC: 1.16 MG/DL (ref 0.66–1.25)
ERYTHROCYTE [DISTWIDTH] IN BLOOD BY AUTOMATED COUNT: 18.7 % (ref 10–15)
ERYTHROCYTE [DISTWIDTH] IN BLOOD BY AUTOMATED COUNT: 18.9 % (ref 10–15)
FACT X ACT/NOR PPP CHRO: 77 % (ref 70–130)
GFR SERPL CREATININE-BSD FRML MDRD: 65 ML/MIN/{1.73_M2}
GFR SERPL CREATININE-BSD FRML MDRD: 69 ML/MIN/{1.73_M2}
GLUCOSE BLDC GLUCOMTR-MCNC: 135 MG/DL (ref 70–99)
GLUCOSE BLDC GLUCOMTR-MCNC: 187 MG/DL (ref 70–99)
GLUCOSE BLDC GLUCOMTR-MCNC: 196 MG/DL (ref 70–99)
GLUCOSE BLDC GLUCOMTR-MCNC: 215 MG/DL (ref 70–99)
GLUCOSE BLDC GLUCOMTR-MCNC: 238 MG/DL (ref 70–99)
GLUCOSE SERPL-MCNC: 236 MG/DL (ref 70–99)
GLUCOSE SERPL-MCNC: 291 MG/DL (ref 70–99)
HCT VFR BLD AUTO: 32.8 % (ref 40–53)
HCT VFR BLD AUTO: 35 % (ref 40–53)
HGB BLD-MCNC: 10.3 G/DL (ref 13.3–17.7)
HGB BLD-MCNC: 9.8 G/DL (ref 13.3–17.7)
INR PPP: 1.73 (ref 0.86–1.14)
INTERPRETATION ECG - MUSE: NORMAL
INTERPRETATION ECG - MUSE: NORMAL
MCH RBC QN AUTO: 28.6 PG (ref 26.5–33)
MCH RBC QN AUTO: 29.4 PG (ref 26.5–33)
MCHC RBC AUTO-ENTMCNC: 29.4 G/DL (ref 31.5–36.5)
MCHC RBC AUTO-ENTMCNC: 29.9 G/DL (ref 31.5–36.5)
MCV RBC AUTO: 97 FL (ref 78–100)
MCV RBC AUTO: 99 FL (ref 78–100)
PLATELET # BLD AUTO: 415 10E9/L (ref 150–450)
PLATELET # BLD AUTO: 465 10E9/L (ref 150–450)
POTASSIUM SERPL-SCNC: 3.6 MMOL/L (ref 3.4–5.3)
POTASSIUM SERPL-SCNC: 3.8 MMOL/L (ref 3.4–5.3)
RBC # BLD AUTO: 3.33 10E12/L (ref 4.4–5.9)
RBC # BLD AUTO: 3.6 10E12/L (ref 4.4–5.9)
SODIUM SERPL-SCNC: 138 MMOL/L (ref 133–144)
SODIUM SERPL-SCNC: 140 MMOL/L (ref 133–144)
WBC # BLD AUTO: 11 10E9/L (ref 4–11)
WBC # BLD AUTO: 11.2 10E9/L (ref 4–11)

## 2020-02-27 PROCEDURE — 25000128 H RX IP 250 OP 636: Performed by: STUDENT IN AN ORGANIZED HEALTH CARE EDUCATION/TRAINING PROGRAM

## 2020-02-27 PROCEDURE — 25000132 ZZH RX MED GY IP 250 OP 250 PS 637: Mod: GY | Performed by: STUDENT IN AN ORGANIZED HEALTH CARE EDUCATION/TRAINING PROGRAM

## 2020-02-27 PROCEDURE — 71045 X-RAY EXAM CHEST 1 VIEW: CPT

## 2020-02-27 PROCEDURE — 97535 SELF CARE MNGMENT TRAINING: CPT | Mod: GO

## 2020-02-27 PROCEDURE — 97530 THERAPEUTIC ACTIVITIES: CPT | Mod: GP

## 2020-02-27 PROCEDURE — 00000146 ZZHCL STATISTIC GLUCOSE BY METER IP

## 2020-02-27 PROCEDURE — 93005 ELECTROCARDIOGRAM TRACING: CPT

## 2020-02-27 PROCEDURE — 27210437 ZZH NUTRITION PRODUCT SEMIELEM INTERMED LITER

## 2020-02-27 PROCEDURE — 80048 BASIC METABOLIC PNL TOTAL CA: CPT | Performed by: INTERNAL MEDICINE

## 2020-02-27 PROCEDURE — 99291 CRITICAL CARE FIRST HOUR: CPT | Mod: GC | Performed by: INTERNAL MEDICINE

## 2020-02-27 PROCEDURE — 99221 1ST HOSP IP/OBS SF/LOW 40: CPT | Performed by: INTERNAL MEDICINE

## 2020-02-27 PROCEDURE — 93010 ELECTROCARDIOGRAM REPORT: CPT | Performed by: INTERNAL MEDICINE

## 2020-02-27 PROCEDURE — 36415 COLL VENOUS BLD VENIPUNCTURE: CPT | Performed by: INTERNAL MEDICINE

## 2020-02-27 PROCEDURE — 85260 CLOT FACTOR X STUART-POWER: CPT | Performed by: INTERNAL MEDICINE

## 2020-02-27 PROCEDURE — 36415 COLL VENOUS BLD VENIPUNCTURE: CPT | Performed by: THORACIC SURGERY (CARDIOTHORACIC VASCULAR SURGERY)

## 2020-02-27 PROCEDURE — 25800030 ZZH RX IP 258 OP 636: Performed by: STUDENT IN AN ORGANIZED HEALTH CARE EDUCATION/TRAINING PROGRAM

## 2020-02-27 PROCEDURE — 25000132 ZZH RX MED GY IP 250 OP 250 PS 637: Mod: GY | Performed by: THORACIC SURGERY (CARDIOTHORACIC VASCULAR SURGERY)

## 2020-02-27 PROCEDURE — 85610 PROTHROMBIN TIME: CPT | Performed by: INTERNAL MEDICINE

## 2020-02-27 PROCEDURE — 85027 COMPLETE CBC AUTOMATED: CPT | Performed by: INTERNAL MEDICINE

## 2020-02-27 PROCEDURE — 85730 THROMBOPLASTIN TIME PARTIAL: CPT | Performed by: THORACIC SURGERY (CARDIOTHORACIC VASCULAR SURGERY)

## 2020-02-27 PROCEDURE — 21400000 ZZH R&B CCU UMMC

## 2020-02-27 PROCEDURE — 97116 GAIT TRAINING THERAPY: CPT | Mod: GP

## 2020-02-27 RX ORDER — SPIRONOLACTONE 25 MG/5ML
12.5 SUSPENSION ORAL DAILY
Status: DISCONTINUED | OUTPATIENT
Start: 2020-02-27 | End: 2020-02-27

## 2020-02-27 RX ORDER — SPIRONOLACTONE 25 MG
12.5 TABLET ORAL DAILY
Status: DISCONTINUED | OUTPATIENT
Start: 2020-02-27 | End: 2020-02-28

## 2020-02-27 RX ORDER — WARFARIN SODIUM 7.5 MG/1
7.5 TABLET ORAL
Status: COMPLETED | OUTPATIENT
Start: 2020-02-27 | End: 2020-02-27

## 2020-02-27 RX ORDER — MAGNESIUM SULFATE HEPTAHYDRATE 40 MG/ML
2 INJECTION, SOLUTION INTRAVENOUS ONCE
Status: COMPLETED | OUTPATIENT
Start: 2020-02-27 | End: 2020-02-27

## 2020-02-27 RX ORDER — MULTIPLE VITAMINS W/ MINERALS TAB 9MG-400MCG
1 TAB ORAL DAILY
Status: DISCONTINUED | OUTPATIENT
Start: 2020-02-27 | End: 2020-03-20 | Stop reason: HOSPADM

## 2020-02-27 RX ADMIN — DIGOXIN 125 MCG: 125 TABLET ORAL at 08:00

## 2020-02-27 RX ADMIN — HYDRALAZINE HYDROCHLORIDE 100 MG: 100 TABLET, FILM COATED ORAL at 21:32

## 2020-02-27 RX ADMIN — CEFTRIAXONE 2 G: 2 INJECTION, POWDER, FOR SOLUTION INTRAMUSCULAR; INTRAVENOUS at 10:16

## 2020-02-27 RX ADMIN — MULTIVITAMIN 15 ML: LIQUID ORAL at 07:59

## 2020-02-27 RX ADMIN — Medication 40 MG: at 08:01

## 2020-02-27 RX ADMIN — ASPIRIN 81 MG CHEWABLE TABLET 81 MG: 81 TABLET CHEWABLE at 08:00

## 2020-02-27 RX ADMIN — AMPICILLIN SODIUM 2 G: 2 INJECTION, POWDER, FOR SOLUTION INTRAMUSCULAR; INTRAVENOUS at 14:19

## 2020-02-27 RX ADMIN — HYDRALAZINE HYDROCHLORIDE 100 MG: 100 TABLET, FILM COATED ORAL at 05:28

## 2020-02-27 RX ADMIN — INSULIN ASPART 4 UNITS: 100 INJECTION, SOLUTION INTRAVENOUS; SUBCUTANEOUS at 16:56

## 2020-02-27 RX ADMIN — GABAPENTIN 100 MG: 100 CAPSULE ORAL at 08:00

## 2020-02-27 RX ADMIN — INSULIN ASPART 4 UNITS: 100 INJECTION, SOLUTION INTRAVENOUS; SUBCUTANEOUS at 05:28

## 2020-02-27 RX ADMIN — GABAPENTIN 100 MG: 100 CAPSULE ORAL at 21:30

## 2020-02-27 RX ADMIN — MULTIPLE VITAMINS W/ MINERALS TAB 1 TABLET: TAB at 14:30

## 2020-02-27 RX ADMIN — INSULIN ASPART 3 UNITS: 100 INJECTION, SOLUTION INTRAVENOUS; SUBCUTANEOUS at 02:21

## 2020-02-27 RX ADMIN — AMIODARONE HYDROCHLORIDE 400 MG: 200 TABLET ORAL at 07:59

## 2020-02-27 RX ADMIN — POTASSIUM CHLORIDE 40 MEQ: 750 TABLET, EXTENDED RELEASE ORAL at 05:28

## 2020-02-27 RX ADMIN — BIVALIRUDIN 0.02 MG/KG/HR: 250 INJECTION, POWDER, LYOPHILIZED, FOR SOLUTION INTRAVENOUS at 04:34

## 2020-02-27 RX ADMIN — INSULIN ASPART 2 UNITS: 100 INJECTION, SOLUTION INTRAVENOUS; SUBCUTANEOUS at 12:51

## 2020-02-27 RX ADMIN — WARFARIN SODIUM 7.5 MG: 7.5 TABLET ORAL at 18:26

## 2020-02-27 RX ADMIN — ALLOPURINOL 200 MG: 100 TABLET ORAL at 08:00

## 2020-02-27 RX ADMIN — AMPICILLIN SODIUM 2 G: 2 INJECTION, POWDER, FOR SOLUTION INTRAMUSCULAR; INTRAVENOUS at 21:20

## 2020-02-27 RX ADMIN — AMPICILLIN SODIUM 2 G: 2 INJECTION, POWDER, FOR SOLUTION INTRAMUSCULAR; INTRAVENOUS at 02:16

## 2020-02-27 RX ADMIN — DICLOFENAC 4 G: 10 GEL TOPICAL at 12:17

## 2020-02-27 RX ADMIN — ATORVASTATIN CALCIUM 20 MG: 20 TABLET, FILM COATED ORAL at 21:29

## 2020-02-27 RX ADMIN — GABAPENTIN 100 MG: 100 CAPSULE ORAL at 14:30

## 2020-02-27 RX ADMIN — AMPICILLIN SODIUM 2 G: 2 INJECTION, POWDER, FOR SOLUTION INTRAMUSCULAR; INTRAVENOUS at 08:01

## 2020-02-27 RX ADMIN — Medication 12.5 MG: at 14:44

## 2020-02-27 RX ADMIN — DICLOFENAC 4 G: 10 GEL TOPICAL at 21:26

## 2020-02-27 RX ADMIN — FLUOXETINE HYDROCHLORIDE 20 MG: 20 LIQUID ORAL at 08:01

## 2020-02-27 RX ADMIN — MAGNESIUM SULFATE 2 G: 2 INJECTION INTRAVENOUS at 10:50

## 2020-02-27 RX ADMIN — MAGNESIUM OXIDE 400 MG: 400 TABLET ORAL at 08:00

## 2020-02-27 RX ADMIN — Medication 200 MG: at 08:00

## 2020-02-27 RX ADMIN — DICLOFENAC 4 G: 10 GEL TOPICAL at 08:02

## 2020-02-27 RX ADMIN — HYDRALAZINE HYDROCHLORIDE 100 MG: 100 TABLET, FILM COATED ORAL at 14:30

## 2020-02-27 ASSESSMENT — ACTIVITIES OF DAILY LIVING (ADL)
ADLS_ACUITY_SCORE: 16

## 2020-02-27 ASSESSMENT — PAIN DESCRIPTION - DESCRIPTORS: DESCRIPTORS: SORE;ACHING

## 2020-02-27 NOTE — PROGRESS NOTES
D:  Started LVAD education today.  Basic equipment discussions began with pt, wife and sister in law, Grecia.  A:  Pt awake and alert for the entire 2 hour teaching session.  Family very engaged as well.  P:  Teaching planned for 10-12 pm tomorrow.

## 2020-02-27 NOTE — PROGRESS NOTES
CVTS Progress Note    CO-MORBIDITIES:   Patient Active Problem List   Diagnosis     Acute on chronic systolic congestive heart failure (H)     Anxiety     CKD (chronic kidney disease) stage 3, GFR 30-59 ml/min (H)     Coronary artery disease involving native coronary artery of native heart without angina pectoris     GERD (gastroesophageal reflux disease)     Gout     Hyperlipidemia with target LDL less than 100     Nonischemic cardiomyopathy (H)     Non-nephrotic range proteinuria     ABIHNAV on CPAP     Type 2 diabetes mellitus with stage 3 chronic kidney disease, without long-term current use of insulin (H)     Heart failure (H)     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 Redd on 2/18 for chronic systolic heart failure 2/2 NICM. Extubated 2/21.    PLAN FOR TODAY:  - Transfer to floor  - EP consult for possible v tach   -continue lasix         PLAN:  Neuro/ pain/ sedation:  - Monitor neurological status. Notify the MD for any acute changes in exam.  -dilaudid, tylenol, gabapentin, robaxin   - continue home fluoxetine    Pulmonary care:   #COPD  - extubated 2/21  - ABHINAV on CPAP    Cardiovascular:    #Moderate CAD ([facility] 11/2019 nonobstructive CAD w/ severe branch disease)  #CHF EF 15-20% s/p LVAD placement  #Vfib this admission, s/p amio load  #possible continued vtach   - Monitor hemodynamic status  - Hold PTA entresto, spironolactone  - continue ASA 81 mg, atorvastatin  - continue amio 400 mg BID  -  hydralazine 100 mg TID  -EP consult     GI care:   - dysphagia 3 diet    Fluids/ Electrolytes/ Nutrition:   - TKO for IV fluid hydration  - No indication for parenteral nutrition    Renal/ Fluid Balance:    #CKD III  - Urine output is adequate so far.  - Will continue to monitor intake and output.  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     Patient seen, findings and plan discussed with CVTS fellow    Loreto Holcomb MD   Anesthesia Resident       ====================================  Subjective:  Doing well. No complaints     OBJECTIVE:   1. VITAL SIGNS:   Temp:  [97.7  F (36.5  C)-98.2  F (36.8  C)] 97.8  F (36.6  C)  Pulse:  [111-137] 117  Heart Rate:  [108-141] 118  Resp:  [13-29] 18  BP: ()/() 91/69  SpO2:  [87 %-100 %] 97 %     2. INTAKE/ OUTPUT:   I/O last 3 completed shifts:  In: 4234.7 [P.O.:2000; I.V.:964.7; NG/GT:290]  Out: 5735 [Urine:5075; Chest Tube:660]    3. PHYSICAL EXAMINATION:   General: NAD, laying in hospital bed  Neuro: no focal deficit, follows commands in all 4 extremities  Resp: non labored breathing on RA  CV: RRR  Abdomen: Soft, Non-distended, Non-tender  Incisions: c/d/i  Extremities: warm and well perfused, 1+ edema    4. INVESTIGATIONS:   Arterial Blood Gases     Recent Labs   Lab 02/27/20  0405 02/26/20  1543  02/26/20  0412   WBC 11.2* 12.0*  --  10.9   HGB 9.8* 9.9*  --  9.1*    404  --  354   INR 1.73*  --   --  1.53*    140  --  139   POTASSIUM 3.6 3.3*   < > 3.6   BUN 41* 40*  --  44*   CR 1.11 1.09  --  0.95    < > = values in this interval not displayed.

## 2020-02-27 NOTE — PLAN OF CARE
Heart rate remains 130s-140s of regular rhythm w/ectopy. Dobutamine stopped. No heart rate response. EP consult placed for verification of rhythm per Cards 2. BP remains stable. Last hemodynamics improving. [place] w/mac pulled.

## 2020-02-27 NOTE — PROGRESS NOTES
Calorie Count  Intake recorded for: 2/26  Total Kcals: 1108 Total Protein: 41g  Kcals from Hospital Food: 1108  Protein: 41g  Kcals from Outside Food (average):0 Protein: 0g  # Meals Recorded: 100% orange juice, pancake w/ syrup, fruit ice, chicken florentine pizza   # Supplements Recorded: 100% 1 Boost Plus

## 2020-02-27 NOTE — PLAN OF CARE
Discharge Planner OT   Patient plan for discharge: [address] and OP CR   Current status: Pt making steady progress in therapies. Pt mod A for bed mobility and min A for standing. Pt CGA with FWW for functional transfers and mobility in room. Pt's max  with standing activity, LVAD #s stable.   Barriers to return to prior living situation: medical status, post op precautions, deconditioning, level of A for ADLs and mobility   Recommendations for discharge: ARU   Rationale for recommendations: Pt would benefit from intensive, interdisciplinary rehab to address the above stated deficits to facilitate return towards prior level of function. Pt can/will be able to tolerate 3 hours of therapy a day.          Entered by: Miranda Kaufman 02/27/2020 8:53 AM

## 2020-02-27 NOTE — PROGRESS NOTES
Advanced Heart Failure Consult Progress Note  Eliseo Tanner MRN: [id number]  Age: 64 year old, : 1955  Date: 2020              Assessment and Plan:     Mr Eliseo Tanner is a 65yo M w/PMHx notable for chronic systolic heart failure, NICM, moderate CAD, HTN, ABHINAV on CPAP, DM2, CKDIII who is transferred to [facility] for evaluation and management of advanced heart failure with arrhythmia now s/p HM 3 on .    Today's plan ():  -Continue lasix ggt at 10 ml/hr (goal net negative 2.0 L over next 24 hrs)  -EP consult to assess with diagnosis of wide-complex regular tachycardia    Moderate CAD  Severe Mitral regurgitation  Chronic nonischemic systolic heart failure w/ reduced EF (15-20%) and exacerbation  New atrial fibrillation  NYHA Class III. Echo 2020: LVEF 15-20%; LVEDd 5.9 cm; 4+ MR. [facility] 2019 w/ nonobstructive CAD w/ severe branch disease. Presented with increased wt gain, SOB, LE edema concerning for HF exacerbation, initially concerning for cardiogenic shock. Admitted to [facility] for further evaluation regarding need for advanced HF therapies. Patient is now s/p HM III placement on  with early post-op course complicated by bleeding that is slowing down as of  AM. Patient is persistently tachycardic to 130-140 with a wide-complex mostly-regular tachycardia. ECG obtained difficult to interpret given LVAD artifact, but afib w/ RVR and aberrancy vs. NATHALIA vs. AT vs. Slow VT.  -Continue ASA 81, atorvastatin 20 mg  -Continue coumadin with goal INR 2-3 / CFX goal 20-40 (while on Bival)  -LVAD speed at 5500 RPM  -s/p Lasix 120 mg IV bolus on , then ggt at 10 mg/hr (continue as of )  -Hydralazine 100 mg po TID    -EP consult to assess with diagnosis of wide-complex regular tachycardia    Proteus and Enterococcus bacteremia  Organisms growing from 2/2 blood cultures from . Blood cultured from  NGTD and 2/15 growing 1/2 S.epi, likely contaminant per  "ID.  ID consulted, appreciate help. Will decide on final duration of abx.  -daily blood cultures until negative  -Zosyn (2/13-2/14)  -Tobramycin (2/13-2/14)  -Vancomycin (2/13-2/17  -Meropenem (2/14-2/15)  -Ceftriaxone (2/16-2/28)  -Ampicillin (2/16-3/11) (14 days after swan removal on 2/26)    #Thrombocytopenia:  Concern for HIT given timing with respect to heparin exposure. HIT positive DAVINA negative. Antibody is now negative x2, thus no further indication for PLAX  -Heme consulted  -Bivalirudin gtt after LVAD surgery,goal chromogenic level 20-40     VFib requiring shock  Shocked x 3 prior to transfer, loaded with amio. No known prior history. Suspect related to underlying HF.   -Keep K>4, Mg>2  -Amio 400 mg PO QD  -Needs ICD for secondary prevention      CODE: FULL CODE     Stanford Acuna M.D.  Cardiology Fellow              Subjective/Interval Events     No acute overnight events. This AM, patient denying pain, SOB, CP. Reports that he is sleeping better than in prior nights.          Objective     BP (!) 72/60   Pulse 120   Temp 97.8  F (36.6  C) (Oral)   Resp 18   Ht 1.702 m (5' 7\")   Wt 101.6 kg (223 lb 15.8 oz)   SpO2 96%   BMI 35.08 kg/m    Temp:  [97.7  F (36.5  C)-98.2  F (36.8  C)] 97.8  F (36.6  C)  Pulse:  [111-137] 120  Heart Rate:  [108-141] 121  Resp:  [13-29] 18  BP: ()/() 72/60  SpO2:  [87 %-100 %] 96 %  Wt Readings from Last 2 Encounters:   02/26/20 101.6 kg (223 lb 15.8 oz)     I/O last 3 completed shifts:  In: 4068.9 [P.O.:1910; I.V.:888.9; NG/GT:290]  Out: 5160 [Urine:4600; Chest Tube:560]      Gen: No acute distress  HEENT: NC/AT, +JVD   PULM/THORAX: CTAB  CV: normal rate, regular rhythm, normal S1 and S2, LVAD hum  ABD: Soft, NTND, bowel sounds present, no masses  EXT: WWP. Trace LE edema, clubbing or cyanosis.  NEURO: A&Ox3                Data:     Recent Results (from the past 24 hour(s))   Glucose by meter    Collection Time: 02/26/20 10:51 AM   Result Value Ref Range    " Glucose 196 (H) 70 - 99 mg/dL   Potassium    Collection Time: 02/26/20  1:21 PM   Result Value Ref Range    Potassium 3.6 3.4 - 5.3 mmol/L   Blood gas venous with oxyhemoglobin (PCU Collect TBD)    Collection Time: 02/26/20  1:21 PM   Result Value Ref Range    Ph Venous 7.47 (H) 7.32 - 7.43 pH    PCO2 Venous 48 40 - 50 mm Hg    PO2 Venous 28 25 - 47 mm Hg    Bicarbonate Venous 35 (H) 21 - 28 mmol/L    FIO2 21.0     Oxyhemoglobin Venous 48 %    Base Excess Venous 10.0 mmol/L   Basic metabolic panel    Collection Time: 02/26/20  3:43 PM   Result Value Ref Range    Sodium 140 133 - 144 mmol/L    Potassium 3.3 (L) 3.4 - 5.3 mmol/L    Chloride 100 94 - 109 mmol/L    Carbon Dioxide 37 (H) 20 - 32 mmol/L    Anion Gap 4 3 - 14 mmol/L    Glucose 214 (H) 70 - 99 mg/dL    Urea Nitrogen 40 (H) 7 - 30 mg/dL    Creatinine 1.09 0.66 - 1.25 mg/dL    GFR Estimate 70 >60 mL/min/[1.73_m2]    GFR Estimate If Black 81 >60 mL/min/[1.73_m2]    Calcium 7.6 (L) 8.5 - 10.1 mg/dL   CBC with platelets    Collection Time: 02/26/20  3:43 PM   Result Value Ref Range    WBC 12.0 (H) 4.0 - 11.0 10e9/L    RBC Count 3.38 (L) 4.4 - 5.9 10e12/L    Hemoglobin 9.9 (L) 13.3 - 17.7 g/dL    Hematocrit 32.0 (L) 40.0 - 53.0 %    MCV 95 78 - 100 fl    MCH 29.3 26.5 - 33.0 pg    MCHC 30.9 (L) 31.5 - 36.5 g/dL    RDW 18.7 (H) 10.0 - 15.0 %    Platelet Count 404 150 - 450 10e9/L   EKG 12-lead, tracing only    Collection Time: 02/26/20  4:37 PM   Result Value Ref Range    Interpretation ECG Click View Image link to view waveform and result    Glucose by meter    Collection Time: 02/26/20  4:43 PM   Result Value Ref Range    Glucose 218 (H) 70 - 99 mg/dL   Glucose by meter    Collection Time: 02/26/20 10:16 PM   Result Value Ref Range    Glucose 165 (H) 70 - 99 mg/dL   Glucose by meter    Collection Time: 02/27/20  2:20 AM   Result Value Ref Range    Glucose 196 (H) 70 - 99 mg/dL   Basic metabolic panel    Collection Time: 02/27/20  4:05 AM   Result Value Ref Range     Sodium 140 133 - 144 mmol/L    Potassium 3.6 3.4 - 5.3 mmol/L    Chloride 100 94 - 109 mmol/L    Carbon Dioxide 36 (H) 20 - 32 mmol/L    Anion Gap 4 3 - 14 mmol/L    Glucose 236 (H) 70 - 99 mg/dL    Urea Nitrogen 41 (H) 7 - 30 mg/dL    Creatinine 1.11 0.66 - 1.25 mg/dL    GFR Estimate 69 >60 mL/min/[1.73_m2]    GFR Estimate If Black 79 >60 mL/min/[1.73_m2]    Calcium 7.8 (L) 8.5 - 10.1 mg/dL   CBC with platelets    Collection Time: 20  4:05 AM   Result Value Ref Range    WBC 11.2 (H) 4.0 - 11.0 10e9/L    RBC Count 3.33 (L) 4.4 - 5.9 10e12/L    Hemoglobin 9.8 (L) 13.3 - 17.7 g/dL    Hematocrit 32.8 (L) 40.0 - 53.0 %    MCV 99 78 - 100 fl    MCH 29.4 26.5 - 33.0 pg    MCHC 29.9 (L) 31.5 - 36.5 g/dL    RDW 18.9 (H) 10.0 - 15.0 %    Platelet Count 415 150 - 450 10e9/L   INR    Collection Time: 20  4:05 AM   Result Value Ref Range    INR 1.73 (H) 0.86 - 1.14   Partial thromboplastin time    Collection Time: 20  5:29 AM   Result Value Ref Range    PTT 65 (H) 22 - 37 sec       Recent Results (from the past 24 hour(s))   Echocardiogram Complete    Narrative    [id number]  [id number]  [id number]  [id number]^MATT^NAHUN^JIM           [facility]  Echocardiography Laboratory  [address] [place] [id number]     Name: WOLFGANG LEACH  MRN: [id number]  : 1955  Study Date: 2020 10:06 AM  Age: 64 yrs  Gender: Male  Patient Location: [facility]  Reason For Study: Heart Failure  Ordering Physician: NAHUN GUTIERREZ  Referring Physician: SELF, REFERRED  Performed By: Yvonne Adan RDCS     BSA: 2.2 m2  Height: 67 in  Weight: 244 lb  HR: 103  BP: 72/52 mmHg  _____________________________________________________________________________  __        Procedure  Complete Portable Echo Adult. Contrast Optison. Optison (NDC #3440-6997-26)  given intravenously. Patient was given 6 ml mixture of 3 ml Optison and 6 ml  saline. 3 ml  wasted.  _____________________________________________________________________________  __        Interpretation Summary  Left ventricular size is normal.  LVEF 20% based on biplane 2D tracing.  Mild to moderate right ventricular dilation is present.  Global right ventricular function is moderately reduced.  Pulmonary artery systolic pressure is normal.  Dilation of the inferior vena cava is present with abnormal respiratory  variation in diameter.  _____________________________________________________________________________  __        Left Ventricle  Left ventricular size is normal. LVEF 20% based on biplane 2D tracing. Left  ventricular wall thickness is normal. Diastolic function not assessed due to  frequent ectopy. Abnormal septal motion consistent with left bundle branch  block is present.     Right Ventricle  Mild to moderate right ventricular dilation is present. Global right  ventricular function is moderately reduced.     Atria  Mild biatrial enlargement is present.     Mitral Valve  Moderate mitral insufficiency is present.        Aortic Valve  Aortic valve is normal in structure and function.     Tricuspid Valve  Moderate tricuspid insufficiency is present. The right ventricular systolic  pressure is approximated at 24.4 mmHg plus the right atrial pressure.  Pulmonary artery systolic pressure is normal.     Pulmonic Valve  The pulmonic valve cannot be assessed. Mild pulmonic insufficiency is present.     Vessels  The aorta root is normal. The pulmonary artery cannot be assessed. Dilation of  the inferior vena cava is present with abnormal respiratory variation in  diameter.     Compared to Previous Study  Previous study not available for comparison.     _____________________________________________________________________________  __  MMode/2D Measurements & Calculations  IVSd: 0.61 cm  LVIDd: 4.7 cm  LVIDs: 3.7 cm  LVPWd: 0.75 cm  FS: 21.7 %  LV mass(C)d: 99.1 grams  LV mass(C)dI: 45.0 grams/m2     Ao  root diam: 2.9 cm  asc Aorta Diam: 2.5 cm  LVOT diam: 1.9 cm  LVOT area: 2.8 cm2     EF(MOD-bp): 21.5 %  LA Volume (BP): 84.8 ml  LA Volume Index (BP): 38.5 ml/m2  RWT: 0.32        Doppler Measurements & Calculations  PA acc time: 0.09 sec     PI end-d saumya: 154.0 cm/sec  TR max saumya: 247.0 cm/sec  TR max P.4 mmHg     _____________________________________________________________________________  __           Report approved by: Graciela Tomlinson 2020 12:05 PM      XR Chest Port 1 View    Narrative    XR CHEST PORT 1 VW  2020 4:21 PM      HISTORY: SG Placement    COMPARISON: None available    FINDINGS: Single AP chest radiograph. [place] catheter tip is in the  proximal right main pulmonary artery. Right central line tip and right  upper extremity PICC line tip at the lower SVC. Trachea is midline,  cardiomegaly. Bilateral perihilar streaky opacities. Mild increased  interstitial opacities in the right lung with ill-defined pulmonary  vascularity. No pneumothorax or pleural effusion. No acute osseous or  abdominal abnormality. Elevated left hemidiaphragm.      Impression    IMPRESSION:  1. [place] catheter tip at the proximal right main pulmonary artery.  2. Cardiomegaly with mild pulmonary edema, especially on the right.    I have personally reviewed the examination and initial interpretation  and I agree with the findings.    DONG SOLORIO MD   Cardiac Catheterization    Narrative      Successful insertion of a IABP in the RFA                Medications     Current Facility-Administered Medications   Medication     acetaminophen (TYLENOL) tablet 650 mg     albuterol (PROAIR HFA/PROVENTIL HFA/VENTOLIN HFA) 108 (90 Base) MCG/ACT inhaler 2 puff     allopurinol (ZYLOPRIM) tablet 200 mg     amiodarone (PACERONE) tablet 400 mg    Followed by     [START ON 3/8/2020] amiodarone (PACERONE) tablet 200 mg     ampicillin (OMNIPEN) 2 g vial to attach to  ml bag     aspirin (ASA) chewable tablet 81  mg     atorvastatin (LIPITOR) tablet 20 mg     bivalirudin (ANGIOMAX) 250 mg in sodium chloride 0.9 % 250 mL ANTICOAGULANT infusion     calcium carbonate (TUMS) chewable tablet 500 mg     cefTRIAXone (ROCEPHIN) 2 g vial to attach to  ml bag for ADULTS or NS 50 ml bag for PEDS     co-enzyme Q-10 capsule 200 mg     dextrose 10% infusion     glucose gel 15-30 g    Or     dextrose 50 % injection 25-50 mL    Or     glucagon injection 1 mg     diclofenac (VOLTAREN) 1 % topical gel 4 g     digoxin (LANOXIN) tablet 125 mcg     FLUoxetine (PROzac) solution 20 mg     furosemide (LASIX) 500 mg/50mL infusion ADULT MAX CONC     gabapentin (NEURONTIN) capsule 100 mg     hydrALAZINE (APRESOLINE) tablet 100 mg     HYDROmorphone (DILAUDID) tablet 2 mg     insulin aspart (NovoLOG) injection (RAPID ACTING)     insulin aspart (NovoLOG) injection (RAPID ACTING)     insulin glargine (LANTUS PEN) injection 10 Units     lidocaine (LMX4) cream     lidocaine 1 % 0.1-1 mL     magnesium oxide (MAG-OX) tablet 400 mg     magnesium sulfate 2 g in water intermittent infusion     magnesium sulfate 4 g in 100 mL sterile water (premade)     medication instruction     melatonin tablet 3 mg     methocarbamol (ROBAXIN) tablet 500 mg     multivitamins w/minerals (CERTAVITE) liquid 15 mL     naloxone (NARCAN) injection 0.1-0.4 mg     pantoprazole (PROTONIX) 2 mg/mL suspension 40 mg     polyethylene glycol (MIRALAX) Packet 17 g     potassium chloride (KLOR-CON) Packet 20-40 mEq     potassium chloride 10 mEq in 100 mL intermittent infusion with 10 mg lidocaine     potassium chloride 10 mEq in 100 mL sterile water intermittent infusion (premix)     potassium chloride 20 mEq in 50 mL intermittent infusion     potassium chloride ER (KLOR-CON M) CR tablet 20-40 mEq     potassium phosphate 10 mmol in D5W 250 mL intermittent infusion     potassium phosphate 15 mmol in D5W 250 mL intermittent infusion     potassium phosphate 20 mmol in D5W 250 mL  intermittent infusion     potassium phosphate 20 mmol in D5W 500 mL intermittent infusion     potassium phosphate 25 mmol in D5W 500 mL intermittent infusion     QUEtiapine (SEROquel) tablet 25 mg     Reason beta blocker order not selected     senna-docusate (SENOKOT-S/PERICOLACE) 8.6-50 MG per tablet 1 tablet    Or     senna-docusate (SENOKOT-S/PERICOLACE) 8.6-50 MG per tablet 2 tablet     sodium chloride (PF) 0.9% PF flush 3 mL     sodium chloride (PF) 0.9% PF flush 3 mL     Warfarin Therapy Reminder (Check START DATE - warfarin may be starting in the FUTURE)

## 2020-02-27 NOTE — PROGRESS NOTES
CLINICAL NUTRITION SERVICES - BRIEF NOTE   (see RD note on 2/24 for full assessment)    Calorie counts:  2/26 1108 kcal  41 g protein   2/25 1175 kcal 45 g protein  2/24   356 kcal 10 g protein    Appetite improving over past few days. Eating 2-3 meals/day and drinking boost plus daily.     Nutrition changes today:  - Remove feeding tube   - Provided cafeteria passes to promote oral intake and avoid menu fatigue.     Mairsela Bhandari RD, LD  [id number]  Pgr: [phone]

## 2020-02-27 NOTE — PROGRESS NOTES
Major Shift Events:  No acute events overnight. Pt slept well. -120 most of the night, regular with some PVCs. VAD numbers stable.  Plan: Tx to 6C  For vital signs and complete assessments, please see documentation flowsheets.

## 2020-02-28 ENCOUNTER — ANESTHESIA (OUTPATIENT)
Dept: SURGERY | Facility: CLINIC | Age: 65
DRG: 001 | End: 2020-02-28
Payer: MEDICARE

## 2020-02-28 ENCOUNTER — ANESTHESIA EVENT (OUTPATIENT)
Dept: SURGERY | Facility: CLINIC | Age: 65
DRG: 001 | End: 2020-02-28
Payer: MEDICARE

## 2020-02-28 ENCOUNTER — APPOINTMENT (OUTPATIENT)
Dept: OCCUPATIONAL THERAPY | Facility: CLINIC | Age: 65
DRG: 001 | End: 2020-02-28
Attending: INTERNAL MEDICINE
Payer: MEDICARE

## 2020-02-28 ENCOUNTER — APPOINTMENT (OUTPATIENT)
Dept: CARDIOLOGY | Facility: CLINIC | Age: 65
DRG: 001 | End: 2020-02-28
Attending: STUDENT IN AN ORGANIZED HEALTH CARE EDUCATION/TRAINING PROGRAM
Payer: MEDICARE

## 2020-02-28 LAB
ANION GAP SERPL CALCULATED.3IONS-SCNC: 4 MMOL/L (ref 3–14)
APTT PPP: 72 SEC (ref 22–37)
BACTERIA SPEC CULT: NO GROWTH
BACTERIA SPEC CULT: NO GROWTH
BUN SERPL-MCNC: 40 MG/DL (ref 7–30)
CALCIUM SERPL-MCNC: 8.6 MG/DL (ref 8.5–10.1)
CHLORIDE SERPL-SCNC: 98 MMOL/L (ref 94–109)
CO2 SERPL-SCNC: 37 MMOL/L (ref 20–32)
CREAT SERPL-MCNC: 1.48 MG/DL (ref 0.66–1.25)
ERYTHROCYTE [DISTWIDTH] IN BLOOD BY AUTOMATED COUNT: 18.8 % (ref 10–15)
FACT X ACT/NOR PPP CHRO: 68 % (ref 70–130)
GFR SERPL CREATININE-BSD FRML MDRD: 48 ML/MIN/{1.73_M2}
GLUCOSE BLDC GLUCOMTR-MCNC: 117 MG/DL (ref 70–99)
GLUCOSE BLDC GLUCOMTR-MCNC: 126 MG/DL (ref 70–99)
GLUCOSE BLDC GLUCOMTR-MCNC: 134 MG/DL (ref 70–99)
GLUCOSE BLDC GLUCOMTR-MCNC: 153 MG/DL (ref 70–99)
GLUCOSE BLDC GLUCOMTR-MCNC: 167 MG/DL (ref 70–99)
GLUCOSE BLDC GLUCOMTR-MCNC: 235 MG/DL (ref 70–99)
GLUCOSE SERPL-MCNC: 135 MG/DL (ref 70–99)
HCT VFR BLD AUTO: 35 % (ref 40–53)
HGB BLD-MCNC: 10.4 G/DL (ref 13.3–17.7)
INR PPP: 1.98 (ref 0.86–1.14)
LACTATE BLD-SCNC: 1.4 MMOL/L (ref 0.7–2)
MAGNESIUM SERPL-MCNC: 1.8 MG/DL (ref 1.6–2.3)
MCH RBC QN AUTO: 28.6 PG (ref 26.5–33)
MCHC RBC AUTO-ENTMCNC: 29.7 G/DL (ref 31.5–36.5)
MCV RBC AUTO: 96 FL (ref 78–100)
PHOSPHATE SERPL-MCNC: 4.4 MG/DL (ref 2.5–4.5)
PLATELET # BLD AUTO: 486 10E9/L (ref 150–450)
POTASSIUM SERPL-SCNC: 3.4 MMOL/L (ref 3.4–5.3)
RBC # BLD AUTO: 3.64 10E12/L (ref 4.4–5.9)
SODIUM SERPL-SCNC: 139 MMOL/L (ref 133–144)
SPECIMEN SOURCE: NORMAL
SPECIMEN SOURCE: NORMAL
WBC # BLD AUTO: 11.7 10E9/L (ref 4–11)

## 2020-02-28 PROCEDURE — 80048 BASIC METABOLIC PNL TOTAL CA: CPT | Performed by: INTERNAL MEDICINE

## 2020-02-28 PROCEDURE — 5A2204Z RESTORATION OF CARDIAC RHYTHM, SINGLE: ICD-10-PCS | Performed by: INTERNAL MEDICINE

## 2020-02-28 PROCEDURE — 93010 ELECTROCARDIOGRAM REPORT: CPT | Mod: 59 | Performed by: INTERNAL MEDICINE

## 2020-02-28 PROCEDURE — 25000128 H RX IP 250 OP 636: Performed by: STUDENT IN AN ORGANIZED HEALTH CARE EDUCATION/TRAINING PROGRAM

## 2020-02-28 PROCEDURE — 25800030 ZZH RX IP 258 OP 636: Performed by: STUDENT IN AN ORGANIZED HEALTH CARE EDUCATION/TRAINING PROGRAM

## 2020-02-28 PROCEDURE — 83605 ASSAY OF LACTIC ACID: CPT

## 2020-02-28 PROCEDURE — 85730 THROMBOPLASTIN TIME PARTIAL: CPT | Performed by: INTERNAL MEDICINE

## 2020-02-28 PROCEDURE — 84100 ASSAY OF PHOSPHORUS: CPT | Performed by: INTERNAL MEDICINE

## 2020-02-28 PROCEDURE — 37000008 ZZH ANESTHESIA TECHNICAL FEE, 1ST 30 MIN

## 2020-02-28 PROCEDURE — 25000125 ZZHC RX 250: Performed by: NURSE ANESTHETIST, CERTIFIED REGISTERED

## 2020-02-28 PROCEDURE — 99233 SBSQ HOSP IP/OBS HIGH 50: CPT | Mod: 25 | Performed by: INTERNAL MEDICINE

## 2020-02-28 PROCEDURE — 25000132 ZZH RX MED GY IP 250 OP 250 PS 637: Mod: GY | Performed by: STUDENT IN AN ORGANIZED HEALTH CARE EDUCATION/TRAINING PROGRAM

## 2020-02-28 PROCEDURE — 92960 CARDIOVERSION ELECTRIC EXT: CPT | Performed by: INTERNAL MEDICINE

## 2020-02-28 PROCEDURE — 83735 ASSAY OF MAGNESIUM: CPT | Performed by: INTERNAL MEDICINE

## 2020-02-28 PROCEDURE — 00000146 ZZHCL STATISTIC GLUCOSE BY METER IP

## 2020-02-28 PROCEDURE — 25000132 ZZH RX MED GY IP 250 OP 250 PS 637: Mod: GY | Performed by: THORACIC SURGERY (CARDIOTHORACIC VASCULAR SURGERY)

## 2020-02-28 PROCEDURE — 25000128 H RX IP 250 OP 636: Performed by: PHYSICIAN ASSISTANT

## 2020-02-28 PROCEDURE — 21400000 ZZH R&B CCU UMMC

## 2020-02-28 PROCEDURE — 36415 COLL VENOUS BLD VENIPUNCTURE: CPT

## 2020-02-28 PROCEDURE — 85260 CLOT FACTOR X STUART-POWER: CPT | Performed by: INTERNAL MEDICINE

## 2020-02-28 PROCEDURE — 36415 COLL VENOUS BLD VENIPUNCTURE: CPT | Performed by: INTERNAL MEDICINE

## 2020-02-28 PROCEDURE — 85610 PROTHROMBIN TIME: CPT | Performed by: INTERNAL MEDICINE

## 2020-02-28 PROCEDURE — 92960 CARDIOVERSION ELECTRIC EXT: CPT

## 2020-02-28 PROCEDURE — 97535 SELF CARE MNGMENT TRAINING: CPT | Mod: GO

## 2020-02-28 PROCEDURE — 85027 COMPLETE CBC AUTOMATED: CPT | Performed by: INTERNAL MEDICINE

## 2020-02-28 PROCEDURE — 40000065 ZZH STATISTIC EKG NON-CHARGEABLE

## 2020-02-28 RX ORDER — POTASSIUM CHLORIDE 750 MG/1
40 TABLET, EXTENDED RELEASE ORAL
Status: CANCELLED | OUTPATIENT
Start: 2020-02-28

## 2020-02-28 RX ORDER — POTASSIUM CHLORIDE 750 MG/1
20 TABLET, EXTENDED RELEASE ORAL
Status: CANCELLED | OUTPATIENT
Start: 2020-02-28

## 2020-02-28 RX ORDER — MAGNESIUM SULFATE HEPTAHYDRATE 40 MG/ML
2 INJECTION, SOLUTION INTRAVENOUS
Status: CANCELLED | OUTPATIENT
Start: 2020-02-28

## 2020-02-28 RX ORDER — WARFARIN SODIUM 7.5 MG/1
7.5 TABLET ORAL
Status: COMPLETED | OUTPATIENT
Start: 2020-02-28 | End: 2020-02-28

## 2020-02-28 RX ORDER — MAGNESIUM SULFATE HEPTAHYDRATE 40 MG/ML
2 INJECTION, SOLUTION INTRAVENOUS ONCE
Status: COMPLETED | OUTPATIENT
Start: 2020-02-28 | End: 2020-02-28

## 2020-02-28 RX ADMIN — ALLOPURINOL 200 MG: 100 TABLET ORAL at 09:05

## 2020-02-28 RX ADMIN — WARFARIN SODIUM 7.5 MG: 7.5 TABLET ORAL at 18:24

## 2020-02-28 RX ADMIN — POTASSIUM CHLORIDE 20 MEQ: 750 TABLET, EXTENDED RELEASE ORAL at 05:59

## 2020-02-28 RX ADMIN — Medication 40 MG: at 11:31

## 2020-02-28 RX ADMIN — MULTIPLE VITAMINS W/ MINERALS TAB 1 TABLET: TAB at 09:03

## 2020-02-28 RX ADMIN — AMPICILLIN SODIUM 2 G: 2 INJECTION, POWDER, FOR SOLUTION INTRAMUSCULAR; INTRAVENOUS at 02:23

## 2020-02-28 RX ADMIN — AMPICILLIN SODIUM 2 G: 2 INJECTION, POWDER, FOR SOLUTION INTRAMUSCULAR; INTRAVENOUS at 09:07

## 2020-02-28 RX ADMIN — CEFTRIAXONE 2 G: 2 INJECTION, POWDER, FOR SOLUTION INTRAMUSCULAR; INTRAVENOUS at 10:22

## 2020-02-28 RX ADMIN — BIVALIRUDIN 0.02 MG/KG/HR: 250 INJECTION, POWDER, LYOPHILIZED, FOR SOLUTION INTRAVENOUS at 10:22

## 2020-02-28 RX ADMIN — Medication 200 MG: at 09:03

## 2020-02-28 RX ADMIN — ASPIRIN 81 MG CHEWABLE TABLET 81 MG: 81 TABLET CHEWABLE at 09:04

## 2020-02-28 RX ADMIN — BIVALIRUDIN 0.02 MG/KG/HR: 250 INJECTION, POWDER, LYOPHILIZED, FOR SOLUTION INTRAVENOUS at 14:50

## 2020-02-28 RX ADMIN — GABAPENTIN 100 MG: 100 CAPSULE ORAL at 09:05

## 2020-02-28 RX ADMIN — CEFTRIAXONE 2 G: 2 INJECTION, POWDER, FOR SOLUTION INTRAMUSCULAR; INTRAVENOUS at 00:56

## 2020-02-28 RX ADMIN — DIGOXIN 125 MCG: 125 TABLET ORAL at 09:04

## 2020-02-28 RX ADMIN — AMPICILLIN SODIUM 2 G: 2 INJECTION, POWDER, FOR SOLUTION INTRAMUSCULAR; INTRAVENOUS at 14:09

## 2020-02-28 RX ADMIN — Medication 40 MG: at 09:05

## 2020-02-28 RX ADMIN — AMPICILLIN SODIUM 2 G: 2 INJECTION, POWDER, FOR SOLUTION INTRAMUSCULAR; INTRAVENOUS at 20:42

## 2020-02-28 RX ADMIN — FLUOXETINE HYDROCHLORIDE 20 MG: 20 LIQUID ORAL at 09:05

## 2020-02-28 RX ADMIN — INSULIN ASPART 4 UNITS: 100 INJECTION, SOLUTION INTRAVENOUS; SUBCUTANEOUS at 19:54

## 2020-02-28 RX ADMIN — MAGNESIUM SULFATE 2 G: 2 INJECTION INTRAVENOUS at 12:40

## 2020-02-28 RX ADMIN — HYDRALAZINE HYDROCHLORIDE 100 MG: 100 TABLET, FILM COATED ORAL at 09:04

## 2020-02-28 RX ADMIN — MAGNESIUM OXIDE 400 MG: 400 TABLET ORAL at 09:05

## 2020-02-28 RX ADMIN — ATORVASTATIN CALCIUM 20 MG: 20 TABLET, FILM COATED ORAL at 20:42

## 2020-02-28 RX ADMIN — AMIODARONE HYDROCHLORIDE 400 MG: 200 TABLET ORAL at 09:04

## 2020-02-28 RX ADMIN — INSULIN ASPART 1 UNITS: 100 INJECTION, SOLUTION INTRAVENOUS; SUBCUTANEOUS at 14:08

## 2020-02-28 RX ADMIN — MAGNESIUM SULFATE 2 G: 2 INJECTION INTRAVENOUS at 05:59

## 2020-02-28 RX ADMIN — CEFTRIAXONE 2 G: 2 INJECTION, POWDER, FOR SOLUTION INTRAMUSCULAR; INTRAVENOUS at 22:20

## 2020-02-28 ASSESSMENT — ACTIVITIES OF DAILY LIVING (ADL)
ADLS_ACUITY_SCORE: 16
ADLS_ACUITY_SCORE: 16
ADLS_ACUITY_SCORE: 15

## 2020-02-28 ASSESSMENT — NEW YORK HEART ASSOCIATION (NYHA) CLASSIFICATION: NYHA FUNCTIONAL CLASS: IV

## 2020-02-28 ASSESSMENT — ENCOUNTER SYMPTOMS
ORTHOPNEA: 1
DYSRHYTHMIAS: 1

## 2020-02-28 ASSESSMENT — MIFFLIN-ST. JEOR: SCORE: 1684.63

## 2020-02-28 NOTE — PLAN OF CARE
D/continues on ATB, with Lasix and Angiomax drips running. He said coordinator did system check and dressing today in ICU and plans to show family dressing changes tomorrow am. Has arthritis in left hand and using medicated gel and says it helps. ICU tells me he will need and ICD before going home, and since he continues in [place], may get cardioverted or EP/RFA. He has chest tubes to suction. Left groin dressing is CDI.continues on ATB  A/progressing, looking forward to VAD teaching  P/monitor for changes, keep team updated

## 2020-02-28 NOTE — PLAN OF CARE
OT 6C  Discharge Planner OT   Patient plan for discharge: rehab   Current status: Pt requiring min A supine <> sitting and min A for STS. Cuing for adherence to sternal precautions. CGA with FWW for functional mobility within room. Pt with posterior lean and requiring min A on one occasion for LOB. CGA for standing oral hygiene at sink and SBA for bladder management while seated on toilet. With setup and mod A (due to arthritis), pt able to tranfser LVAD wall > batteries. HR 100s throughout with exception of one spike to 124. PIs 4.1-5.1  Barriers to return to prior living situation: medical status, deconditioning and weakness, impaired balance, impaired coordination   Recommendations for discharge: ARU  Rationale for recommendations: Pt will benefit from intensive rehab to promote greater functional IND. Pt motivated and anticipate will be able to tolerate 3 hours of therapy per day.       Entered by: Mima Adan 02/28/2020 3:02 PM

## 2020-02-28 NOTE — PROGRESS NOTES
"CVTS Daily Note  2/28/2020  Attending: Mac Jaramillo, *    S:   No overnight events.  Up from ICU last night.    Pt seen at bedside resting comfortably.  No acute complaints.      Denies F/C/Sweats.  No CP, SOB, or calf pain.    Tolerating diet.  + BM.  + Flatus.    Ambulated well with assistance.    Pain level tolerable. Plan as per Neuro section below.     O:   Vital signs:  Temp: 97.7  F (36.5  C) Temp src: Oral BP: (!) 107/92 Pulse: 117 Heart Rate: 121 Resp: 16 SpO2: 96 % O2 Device: None (Room air) Oxygen Delivery: 1 LPM Height: (170 cm entered into optia) Weight: 94.6 kg (208 lb 8.9 oz)  Estimated body mass index is 32.66 kg/m  as calculated from the following:    Height as of this encounter: 1.702 m (5' 7\").    Weight as of this encounter: 94.6 kg (208 lb 8.9 oz).    Weight; -14 kg since admit and trending down.   24 hr Fluid status; net loss 1.3 L.     MAPs: 80 - 97  Gen: AAO x 3, pleasant, NAD  CV: VAD humming, 's and irregular  Pulm: CTA, no rhonchi or wheezes  Abd: soft, non-tender, no guarding  Ext: no peripheral edema  Incision: clean, dry, intact, no erythema  Chest Tube sites: dressings clean and dry, serosanguinous, no air leak, output 330 cc      * pulled mediastinal and pocket drain, split pleural tubes  Lines: driveline dressing clean and dry   LVAD: speed 5500, flow 4.2 - 4.6, PI 3.5 - 4.6, power 4.3       Labs:  BMP  Recent Labs   Lab 02/28/20  0444 02/27/20  1540 02/27/20  0405 02/26/20  1543    138 140 140   POTASSIUM 3.4 3.8 3.6 3.3*   CHLORIDE 98 97 100 100   CALOS 8.6 8.1* 7.8* 7.6*   CO2 37* 37* 36* 37*   BUN 40* 42* 41* 40*   CR 1.48* 1.16 1.11 1.09   * 291* 236* 214*     CBC  Recent Labs   Lab 02/28/20  0444 02/27/20  1540 02/27/20  0405 02/26/20  1543   WBC 11.7* 11.0 11.2* 12.0*   RBC 3.64* 3.60* 3.33* 3.38*   HGB 10.4* 10.3* 9.8* 9.9*   HCT 35.0* 35.0* 32.8* 32.0*   MCV 96 97 99 95   MCH 28.6 28.6 29.4 29.3   MCHC 29.7* 29.4* 29.9* 30.9*   RDW 18.8* 18.7* " 18.9* 18.7*   * 465* 415 404     INR  Recent Labs   Lab 02/28/20  0444 02/27/20  0405 02/26/20  0412 02/25/20  0324   INR 1.98* 1.73* 1.53* 1.45*      Hepatic Panel   Lab Results   Component Value Date    AST 31 02/24/2020     Lab Results   Component Value Date    ALT 19 02/24/2020     Lab Results   Component Value Date    ALBUMIN 1.8 02/24/2020     GLUCOSE:   Recent Labs   Lab 02/28/20  0604 02/28/20  0444 02/28/20  0428 02/28/20  0101 02/27/20  2114 02/27/20  1646 02/27/20  1540 02/27/20  1216  02/27/20  0405  02/26/20  1543  02/26/20  0412  02/25/20  1554   GLC  --  135*  --   --   --   --  291*  --   --  236*  --  214*  --  242*  --  213*   *  --  117* 126* 135* 238*  --  187*   < >  --    < >  --    < >  --    < >  --     < > = values in this interval not displayed.       Imaging:  reviewed recent imaging, no acute concerns      A/P:   Eliseo Tanner is a 64 year old male with PMHx of DMT2, ABHINAV, CKD III, HTN, and CAD with chronic HF. He is now status post LVAD placement HeartMate III on 2/18 with Dr. Jaramillo and Dr Apodaca.      Neuro:   - Neuro intact  - Pain; tylenol and oxycodone  - Depression; fluoxetine     CV:   - Hx of chronic systolic heart failure with EF 15-20%  - Atrial Fibrillation, EP following, possible cardioversion. Amio 400 mg PO BID with taper written, digoxin 125 mcg.    - HMIII LVAD; ASA 81 mg, atorvastatin. Coumadin goal INR 2-3. INR 1.98  - HTN; hydralazine 100 q 8 hrs    Pulm:   - Hx ABHINAV on CPAP   - Pulm toilet, IS, activity and deep breathing   - Supplemental O2 PRN to keep sats > 92%. Wean off as tolerated.     ID:   - WBC 11.7, afebrile, no signs or symptoms of infection   - Bacteremia 2/13 and 2/15; ampicillin 2 grams q 6 hrs (6 wks?)     GI / FEN:  - Reg diet, bowel regimen  - Hx GERD.     Renal / :   - CKD III; creatinine 1.48, adequate UOP.   - Diuresis PRN      Heme / Anticoagulation:   - Hgb 10's, Plts WNL (slightly elevated)     Endo:   - Hx Gout; allopurinol  200 mg daily   - T2DM; sliding scale insulin and lantus 8 units.     Dispo:   - 6C since 2/27  - Anticipate DC to ARU per PT/OT recs, likely next week       Discussed with CVTS Fellow   Staff surgeons have been informed of changes through both  verbal and written communication.      Diego Zuñiga PA-C  Cardiothoracic Surgery  Pager [phone]    10:09 AM   February 28, 2020

## 2020-02-28 NOTE — PROGRESS NOTES
Day 3 Teaching  Education completed with Wife, SAMIR and daughter,Jonny in regard to dressing change.  Pt was at cardioversion today.    Teaching sessions next week:   Monday: 12:30-2:30  Tuesday: 10-12  Wednesday: 11-1 Thursday: 10-12

## 2020-02-28 NOTE — CONSULTS
Electrophysiology Consultation Note   EP Attending: .   Reason for consultation: WCT.   Provider requesting consultation: , Cardiology II Service.  Date of Service: 2/27/2020      HPI:   Mr. Tanner is a 64 year old male who has a past medical history significant for NICM LVEF 15-20%, moderate nonobstructive CAD, severe MR, HTN, ABHINAV (uses CPAP), DM2, CKD III, HLD, and prior tobacco use.   He started having worsening SOB/MIRANDA in 9/2019 which progressively worsened. In 10/2019 he was admitted to OSH and was found to have newly reduced LVEF down to 25-30% with severe MR. He was diuresed about 8 lbs until his SCr bumped. He was started on HF medications. He underwent a CHAMP which showed severe MR and then a coronary angiogram which showed mild/moderate CAD with severe branch disease and elevated LVEDP. He was admitted and underwent diuresis again. He was then admitted in 1/2020 with HF exacerbation found to be in cardiogenic shock. He was started on Milrinone and underwent diuresis. He was ultimately, very slowly weaned off the milrinone with stable symptoms and labs. SCr at discharge was 1.4 and then on 1/29/20 it had rapid worsening of creat to 2.6. Therefore the decision was made to admit to restart milrinone infusion. Milrinone was restarted and titrated to 0.5 mcg/kg/min. His renal function improved. His Entresto dose was decreased due to lower BP's. PICC line was placed and he was discharged with home milrinone. He then presented for follow up with Cardiology and was feeling poorly, weight gain, feeling SOB, worsening edema, and felt heart racing. He also reported poor appetite as well. He was then sent here for admission. Prior to arrival to floor he was noted to be in Vfib and required shock x 3. He was loaded with amiodarone, ASA 324mg. He ultimately required LVAD placement. His postoperative course was initially complicated by bleeding which has now resolved.  He is currently off all  inotropes, off nitric oxide, and extubated. 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  His chromogenic factor is still subtherapeutic. He is doing overall well except for persistent tachycardia. No LVAD alarms, asymptomatic. Renal function and electrolytes stable.     Past Medical History:   NICM LVEF 15-20%  moderate nonobstructive CAD  severe MR  HTN  ABHINAV (uses CPAP)  DM2  CKD  HLD    Past Surgical History:   Past Surgical History:   Procedure Laterality Date     CV CENTRAL VENOUS CATHETER PLACEMENT N/A 2/13/2020    Procedure: Central Venous Catheter Placement;  Surgeon: Chente Moss MD;  Location:  [facility]     CV INTRA-AORTIC BALLOON PUMP INSERTION N/A 2/7/2020    Procedure: Intra-Aortic Balloon Pump Insertion;  Surgeon: Jose Baldwin MD;  Location:  [facility]ATH [facility]     CV INTRA-AORTIC BALLOON PUMP INSERTION N/A 2/13/2020    Procedure: Intra-Aortic Balloon Pump Insertion;  Surgeon: Chente Moss MD;  Location:  [facility]ARD[facility] CATH LAB     CV SWAN LUCIANA PROCEDURE N/A 2/13/2020    Procedure: [place] Luciana Procedure;  Surgeon: Chente Moss MD;  Location:  HE[facility] CARDI[facility] CATH LAB     INSERT VENTRICULAR ASSIST DEVICE LEFT (HEARTMATE II) N/A 2/18/2020    Procedure: INSERTION, LEFT VENTRICULAR ASSIST DEVICE (HEARTMATE III);  Surgeon: Mac Jaramillo MD;  Location:  OR     Allergies: Per MAR     Allergies   Allergen Reactions     Heparin      HIT screen positive 2/14/20, reflex DAVINA negative; however heme recommended treating as if positive  HIT screen negative 2/11/20     Oxycodone Itching and Other (See Comments)     Medications:   Per MAR current outpatient cardiovascular medications include:   Medications Prior to Admission   Medication Sig Dispense Refill Last Dose     acetaminophen (TYLENOL) 500 MG tablet Take 1,000 mg by mouth daily as needed        albuterol (PROAIR HFA/PROVENTIL HFA/VENTOLIN HFA) 108 (90 Base) MCG/ACT inhaler  Inhale 2 puffs into the lungs every 4 hours as needed        allopurinol (ZYLOPRIM) 100 MG tablet Take 200 mg by mouth daily        aspirin 81 MG EC tablet Take 81 mg by mouth daily        atorvastatin (LIPITOR) 20 MG tablet Take 20 mg by mouth daily        coenzyme Q-10 200 MG CAPS Take 1 capsule by mouth daily        FLUoxetine (PROZAC) 20 MG capsule Take 20 mg by mouth daily        furosemide (LASIX) 40 MG tablet Take 40 mg by mouth daily        glipiZIDE (GLUCOTROL XL) 2.5 MG 24 hr tablet Take 2.5 mg by mouth daily        insulin glargine (BASAGLAR KWIKPEN) 100 UNIT/ML pen Inject 15 Units Subcutaneous At Bedtime        insulin pen needle (BD JAIME U/F) 32G X 4 MM miscellaneous         magnesium oxide (MAG-OX) 400 MG tablet Take 400 mg by mouth daily        MULTIPLE MINERALS-VITAMINS PO Take 1 tablet by mouth daily        omeprazole (PRILOSEC) 20 MG DR capsule Take 20 mg by mouth every morning        ondansetron (ZOFRAN) 4 MG tablet Take 4 mg by mouth every 4 hours as needed for nausea        sacubitril-valsartan (ENTRESTO)  MG per tablet Take 1 tablet by mouth 2 times daily        spironolactone (ALDACTONE) 25 MG tablet Take 25 mg by mouth daily        No current outpatient medications on file.     Current Facility-Administered Medications   Medication Dose Route Frequency     allopurinol  200 mg Oral or Feeding Tube Daily     amiodarone  400 mg Oral Daily    Followed by     [START ON 3/8/2020] amiodarone  200 mg Oral Daily     ampicillin  2 g Intravenous Q6H     aspirin  81 mg Oral or Feeding Tube Daily     atorvastatin  20 mg Oral or Feeding Tube QPM     cefTRIAXone  2 g Intravenous Q12H     co-enzyme Q-10  200 mg Oral or Feeding Tube Daily     diclofenac  4 g Topical 4x Daily     digoxin  125 mcg Oral Daily     FLUoxetine  20 mg Oral or Feeding Tube Daily     gabapentin  100 mg Oral or Feeding Tube TID     hydrALAZINE  100 mg Oral Q8H [facility]     insulin aspart  1-12 Units Subcutaneous 5 times per day      "insulin aspart   Subcutaneous TID w/meals     [START ON 2/28/2020] insulin glargine  15 Units Subcutaneous QAM AC     magnesium oxide  400 mg Oral or Feeding Tube Daily     multivitamin w/minerals  1 tablet Oral Daily     pantoprazole  40 mg Oral QAM AC     senna-docusate  1 tablet Oral or Feeding Tube BID    Or     senna-docusate  2 tablet Oral or Feeding Tube BID     sodium chloride (PF)  3 mL Intracatheter Q8H     spironolactone  12.5 mg Oral Daily     Family History:   No family history on file.  Social History:   Social History     Tobacco Use     Smoking status: Not on file   Substance Use Topics     Alcohol use: Not on file       ROS:   A comprehensive 10 point ROS was negative other than as mentioned in HPI.    Physical Examination:   VITALS: BP (!) 87/57   Pulse 131   Temp 98.1  F (36.7  C) (Oral)   Resp 18   Ht 1.702 m (5' 7\")   Wt 101.6 kg (223 lb 15.8 oz)   SpO2 96%   BMI 35.08 kg/m    GENERAL APPEARANCE: AxO  HEENT: PERRLA.  MMM.   NECK: Supple.   CHEST: CTAB   CARDIOVASCULAR: LVAD Hum.   ABDOMEN: Drive line site covered CDI. ND.  EXTREMITIES: 1+ BLE edema.   NEURO: Grossly nonfocal.   PSYCH: Normal affect.  SKIN: Warm and dry.   Data:   Labs:  BMP  Recent Labs   Lab 02/27/20  1540 02/27/20  0405 02/26/20  1543 02/26/20  1321 02/26/20  0412    140 140  --  139   POTASSIUM 3.8 3.6 3.3* 3.6 3.6   CHLORIDE 97 100 100  --  101   CALOS 8.1* 7.8* 7.6*  --  7.4*   CO2 37* 36* 37*  --  34*   BUN 42* 41* 40*  --  44*   CR 1.16 1.11 1.09  --  0.95   * 236* 214*  --  242*     CBC  Recent Labs   Lab 02/27/20  1540 02/27/20  0405 02/26/20  1543 02/26/20  0412   WBC 11.0 11.2* 12.0* 10.9   RBC 3.60* 3.33* 3.38* 3.16*   HGB 10.3* 9.8* 9.9* 9.1*   HCT 35.0* 32.8* 32.0* 30.0*   MCV 97 99 95 95   MCH 28.6 29.4 29.3 28.8   MCHC 29.4* 29.9* 30.9* 30.3*   RDW 18.7* 18.9* 18.7* 18.6*   * 415 404 354     INR  Recent Labs   Lab 02/27/20  0405 02/26/20  0412 02/25/20  0324 02/24/20  0345   INR 1.73* " 1.53* 1.45* 1.43*     Cholesterol (mg/dL)   Date Value   02/08/2020 118     HDL Cholesterol (mg/dL)   Date Value   02/08/2020 71     LDL Cholesterol Calculated (mg/dL)   Date Value   02/08/2020 35     EKGs:         Tele:       2/25/20 ECHO:   Interpretation Summary  LVAD HM3 5600     Left ventricular size is normal. Severely reduced left ventricular systolic  function. Septum is midline  LVAD inflow or outflow cannulae not visualized. Doppler velocities are not  elevated.  Moderate to severely reduced right ventricular systolic function.  Aortic valve appears closed during the entire cardiac cycle. Trace aortic  insufficiency is present.  IVC is plethoric.  No pericardial effusion present.     Assessment:   Mr. Tanner is a 64 year old male who has a past medical history significant for NICM LVEF 15-20%, moderate nonobstructive CAD, severe MR, HTN, ABHINAV (uses CPAP), DM2, CKD III, HLD, and prior tobacco use.   He started having worsening SOB/MIRANDA in 9/2019 which progressively worsened. In 10/2019 he was admitted to OSH and was found to have newly reduced LVEF down to 25-30% with severe MR. He was diuresed about 8 lbs until his SCr bumped. He was started on HF medications. He underwent a CHAMP which showed severe MR and then a coronary angiogram which showed mild/moderate CAD with severe branch disease and elevated LVEDP. He was admitted and underwent diuresis again. He was then admitted in 1/2020 with HF exacerbation found to be in cardiogenic shock. He was started on Milrinone and underwent diuresis. He was ultimately, very slowly weaned off the milrinone with stable symptoms and labs. SCr at discharge was 1.4 and then on 1/29/20 it had rapid worsening of creat to 2.6. Therefore the decision was made to admit to restart milrinone infusion. Milrinone was restarted and titrated to 0.5 mcg/kg/min. His renal function improved. His Entresto dose was decreased due to lower BP's. PICC line was placed and he was discharged with  home milrinone. He then presented for follow up with Cardiology and was feeling poorly, weight gain, feeling SOB, worsening edema, and felt heart racing. He also reported poor appetite as well. He was then sent here for admission. Prior to arrival to floor he was noted to be in Vfib and required shock x 3. He was loaded with amiodarone, ASA 324mg. He ultimately required LVAD placement. His postoperative course was initially complicated by bleeding which has now resolved.  He is currently off all inotropes, off nitric oxide, and extubated. 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  His chromogenic factor is still subtherapeutic. He is doing overall well except for persistent tachycardia. No LVAD alarms, asymptomatic. Renal function and electrolytes stable.     EP Recommendations:  There was some concern that his WCT was VT. However, upon review of his ECGs and telemetry appears to be most c/w an AFL given fixed -140 bpm, intermittent PVCs, same axis as sinus. He is already on anticoagulation for his LVAD which covers stroke prophylaxis for his AFL. BB are not able to be given now given recent cardiogenic shock and new LVAD. He has been on amiodarone and is well loaded. Given he has not had prior history of AF/AFL and in acute post operative period, we favor DCCV and then would consider ablation if recurrences. We also recommend pursing ICD when closer to discharge. We acknowledge that there is uncertain benefit of ICDs in patients with LVADs and we discussed this we patient. We still tend to favor ICDs in patients' with LVADs as RV is not protected and sustained VT could cause compromise of RV and there is some data with VT associated with embolic events in this patient population. We discussed ICD indications, risks, and benefits in detail with patient. He would be willing to have ICD when timing appropriate.   The patient states understanding and is agreeable with plan.   Thank you for allowing us to participate  in the care of this patient.     The patient was discussed w/ Dr. Cruz.  The above note reflects our joint plan.    JUAN Reich CNP  Electrophysiology Consult Service  Pager: [phone]    EP STAFF NOTE  I have seen and examined the patient as part of a shared visit with LUIZ Reich NP.  I agree with the note above. I reviewed today's vital signs and medications. I have reviewed and discussed with the advanced practice provider their physical examination, assessment, and plan   Briefly, NICM, LVAD, WCT  My key history/exam findings are: Tachy, LVAD.   The key management decisions made by me: AFL rather than VT, DCCV, ICD.    Adi Cruz MD [facility]  Cardiology - Electrophysiology

## 2020-02-28 NOTE — ANESTHESIA POSTPROCEDURE EVALUATION
Anesthesia POST Procedure Evaluation    Patient: Eliseo Tanner   MRN:     [id number] Gender:   male   Age:    64 year old :      1955        Preoperative Diagnosis: Atrial fibrillation status post cardioversion (H) [I48.91]   Procedure(s):  ANESTHESIA, FOR CARDIOVERSION   Postop Comments: No value filed.     Anesthesia Type: No value filed.       Disposition: Admission   Postop Pain Control: Uneventful            Sign Out: Well controlled pain   PONV: No   Neuro/Psych: Uneventful            Sign Out: Acceptable/Baseline neuro status   Airway/Respiratory: Uneventful            Sign Out: Acceptable/Baseline resp. status   CV/Hemodynamics: Uneventful            Sign Out: Acceptable CV status   Other NRE: NONE   DID A NON-ROUTINE EVENT OCCUR? No         Last Anesthesia Record Vitals:  CRNA VITALS  2020 1129 - 2020 1159      2020             NIBP:  118/70    Pulse:  100    SpO2:  98 %          Last PACU Vitals:  Vitals Value Taken Time   /90 2020 11:45 AM   Temp     Pulse     Resp 14 2020 11:45 AM   SpO2 97 % 2020 11:45 AM   Temp src     NIBP     Pulse     SpO2     Resp     Temp     Ht Rate     Temp 2           Electronically Signed By: Vahid Charles MD, 2020, 11:59 AM

## 2020-02-28 NOTE — PLAN OF CARE
PT 4E / Discharge Planner PT   Patient plan for discharge: not discussed  Current status: Patient completes sit<>stand transfers with min Ax1-2 up to FWW, ambulated 100' x2 with FWW + min A, 1 seated rest break for fatigue. LVAD #s stable throughout, -145, on RA, hypertensive post-ambulation 128/107 ().   Barriers to return to prior living situation: medical needs, current functional status  Recommendations for discharge: ARU  Rationale for recommendations: Patient is below baseline for mobility, will require continued skilled therapy to progress strength, activity tolerance and functional independence. Patient is making progress in therapy, has good family support and will tolerate 3 hours therapy/day.       Entered by: Yeyo Angulo 02/27/2020

## 2020-02-28 NOTE — ANESTHESIA PREPROCEDURE EVALUATION
Anesthesia Pre-Procedure Evaluation    Patient: Eliseo Tanner   MRN:     [id number] Gender:   male   Age:    64 year old :      1955        Preoperative Diagnosis: Atrial fibrillation status post cardioversion (H) [I48.91]   Procedure(s):  ANESTHESIA, FOR CARDIOVERSION     LABS:  CBC:   Lab Results   Component Value Date    WBC 11.7 (H) 2020    WBC 11.0 2020    HGB 10.4 (L) 2020    HGB 10.3 (L) 2020    HCT 35.0 (L) 2020    HCT 35.0 (L) 2020     (H) 2020     (H) 2020     BMP:   Lab Results   Component Value Date     2020     2020    POTASSIUM 3.4 2020    POTASSIUM 3.8 2020    CHLORIDE 98 2020    CHLORIDE 97 2020    CO2 37 (H) 2020    CO2 37 (H) 2020    BUN 40 (H) 2020    BUN 42 (H) 2020    CR 1.48 (H) 2020    CR 1.16 2020     (H) 2020     (H) 2020     COAGS:   Lab Results   Component Value Date    PTT 72 (H) 2020    INR 1.98 (H) 2020    FIBR 247 2020     POC:   Lab Results   Component Value Date     (H) 2020     OTHER:   Lab Results   Component Value Date    PH 7.46 (H) 2020    LACT 0.7 2020    A1C 7.3 (H) 2020    CALOS 8.6 2020    PHOS 4.4 2020    MAG 1.8 2020    ALBUMIN 1.8 (L) 2020    PROTTOTAL 5.1 (L) 2020    ALT 19 2020    AST 31 2020    ALKPHOS 80 2020    BILITOTAL 0.2 2020    TSH 1.72 2020    CRP 21.0 (H) 2020        Preop Vitals    BP Readings from Last 3 Encounters:   20 119/68    Pulse Readings from Last 3 Encounters:   20 117      Resp Readings from Last 3 Encounters:   20 16    SpO2 Readings from Last 3 Encounters:   20 97%      Temp Readings from Last 1 Encounters:   20 36.5  C (97.7  F) (Oral)    Ht Readings from Last 1 Encounters:   No data found for Ht      Wt Readings from  "Last 1 Encounters:   02/28/20 94.6 kg (208 lb 8.9 oz)    Estimated body mass index is 32.66 kg/m  as calculated from the following:    Height as of this encounter: 1.702 m (5' 7\").    Weight as of this encounter: 94.6 kg (208 lb 8.9 oz).     LDA:  Peripheral IV 02/13/20 Left Upper forearm (Active)   Site Assessment [facility] 2/28/2020  4:00 AM   Line Status Infusing 2/28/2020  4:00 AM   Phlebitis Scale 0-->no symptoms 2/28/2020  4:00 AM   Infiltration Scale 0 2/28/2020  4:00 AM   Infiltration Site Treatment Method  None 2/28/2020  4:00 AM   Extravasation? No 2/28/2020  4:00 AM   Dressing Intervention Dressing reinforced 2/19/2020  4:00 AM   Number of days: 15       Peripheral IV 02/26/20 Right;Posterior Upper forearm (Active)   Site Assessment [facility] 2/28/2020  4:00 AM   Line Status Infusing 2/28/2020  4:00 AM   Phlebitis Scale 0-->no symptoms 2/28/2020  4:00 AM   Infiltration Scale 0 2/28/2020  4:00 AM   Infiltration Site Treatment Method  None 2/28/2020  4:00 AM   Extravasation? No 2/28/2020  4:00 AM   Number of days: 2       Left Groin Interventional Procedure Access (Active)   Site Assessment [facility] 2/28/2020  4:00 AM   Hemostasis management Unchanged 2/28/2020  4:00 AM   Femoral Bruit not present 2/28/2020  4:00 AM   CMS Left Extremity WDL 2/28/2020  4:00 AM   Dorsalis Pulse - Left Leg Weak 2/28/2020  4:00 AM   Popliteal Pulse - Left Leg Weak 2/24/2020  8:00 AM   Posterior Tibial Pulse - Left Leg Weak 2/28/2020  4:00 AM   Number of days: 8       Chest Tube 1 Left Mediastinal 36 Bulgarian (Active)   Site Assessment UTV 2/28/2020  4:00 AM   Suction -20 cm H2O 2/28/2020  4:00 AM   Chest Tube Airleak No 2/28/2020  4:00 AM   Drainage Description Serosanguinous 2/28/2020  4:00 AM   Dressing Status Normal: Clean, Dry & Intact 2/28/2020  4:00 AM   Dressing Change Due 02/27/20 2/27/2020  8:00 AM   Dressing Intervention Gauze 2/28/2020  4:00 AM   Patency Intervention Tip/Tilt 2/28/2020  4:00 AM   Chest Tube Clamps at Bedside present " 2/28/2020  4:00 AM   Container Amount 575 2/28/2020  4:00 AM   Output (ml) 25 ml 2/28/2020  4:00 AM   Number of days: 10       Chest Tube 2 Left Mediastinal 36 Cuban (Active)   Site Assessment UTV 2/28/2020  4:00 AM   Suction -20 cm H2O 2/28/2020  4:00 AM   Chest Tube Airleak No 2/28/2020  4:00 AM   Drainage Description Serosanguinous 2/28/2020  4:00 AM   Dressing Status Normal: Clean, Dry & Intact 2/28/2020  4:00 AM   Dressing Change Due 02/27/20 2/27/2020  8:00 AM   Dressing Intervention Gauze 2/28/2020  4:00 AM   Patency Intervention Tip/Tilt 2/28/2020  4:00 AM   Chest Tube Clamps at Bedside present 2/28/2020  4:00 AM   Number of days: 10       Chest Tube 3 Left Pleural 28 Cuban (Active)   Site Assessment [place] 2/28/2020  4:00 AM   Suction -20 cm H2O 2/28/2020  4:00 AM   Chest Tube Airleak No 2/28/2020  4:00 AM   Drainage Description Serosanguinous 2/28/2020  4:00 AM   Dressing Status Normal: Clean, Dry & Intact 2/28/2020  4:00 AM   Dressing Change Due 02/27/20 2/27/2020  8:00 AM   Dressing Intervention Gauze 2/28/2020  4:00 AM   Patency Intervention Tip/Tilt 2/28/2020  4:00 AM   Chest Tube Clamps at Bedside present 2/28/2020  4:00 AM   Number of days: 10       Chest Tube 4 Left Pleural 28 Cuban (Active)   Site Assessment UTV 2/28/2020  4:00 AM   Suction -20 cm H2O 2/28/2020  4:00 AM   Chest Tube Airleak No 2/28/2020  4:00 AM   Drainage Description Serosanguinous 2/28/2020  4:00 AM   Dressing Status Normal: Clean, Dry & Intact 2/28/2020  4:00 AM   Dressing Change Due 02/27/20 2/27/2020  8:00 AM   Dressing Intervention Gauze 2/28/2020  4:00 AM   Patency Intervention Tip/Tilt 2/28/2020  4:00 AM   Chest Tube Clamps at Bedside present 2/28/2020  4:00 AM   Container Amount 900 2/28/2020  4:00 AM   Output (ml) 75 ml 2/28/2020  4:00 AM   Number of days: 10       Closed/Suction Drain 1 Left Chest Other (Comment) 24 Cuban (Active)   Site Description UTV 2/28/2020  4:00 AM   Dressing Status Normal: Clean, Dry & Intact  2/28/2020  4:00 AM   Dressing Change Due 02/27/20 2/27/2020  8:00 AM   Drainage Appearance Serosanguenous 2/28/2020  4:00 AM   Status Other (Comment) 2/28/2020  4:00 AM   Number of days: 10        No past medical history on file.   Past Surgical History:   Procedure Laterality Date     CV CENTRAL VENOUS CATHETER PLACEMENT N/A 2/13/2020    Procedure: Central Venous Catheter Placement;  Surgeon: Chente Moss MD;  Location:  HEART CARDIAC CATH LAB     CV INTRA-AORTIC BALLOON PUMP INSERTION N/A 2/7/2020    Procedure: Intra-Aortic Balloon Pump Insertion;  Surgeon: Jose Baldwin MD;  Location:  HEART CARDIAC CATH LAB     CV INTRA-AORTIC BALLOON PUMP INSERTION N/A 2/13/2020    Procedure: Intra-Aortic Balloon Pump Insertion;  Surgeon: Chente Moss MD;  Location:  HEART CARDIAC CATH LAB     CV SWAN LUCIANA PROCEDURE N/A 2/13/2020    Procedure: [place] Luc[place] Procedure;  Surgeon: Chente Moss MD;  Location:  HEART CARDIAC CATH LAB     INSERT VENTRICULAR ASSIST DEVICE LEFT (HEARTMATE II) N/A 2/18/2020    Procedure: INSERTION, LEFT VENTRICULAR ASSIST DEVICE (HEARTMATE III);  Surgeon: Mac Jaramillo MD;  Location:  OR      Allergies   Allergen Reactions     Heparin      HIT screen positive 2/14/20, reflex MARIO ALBERTO negative; however heme recommended treating as if positive  HIT screen negative 2/11/20     Oxycodone Itching and Other (See Comments)        Anesthesia Evaluation     .             ROS/MED HX    ENT/Pulmonary:     (+)sleep apnea, uses CPAP , . .    Neurologic:       Cardiovascular: Comment: S/p LVAD placement on 02/18/20  Now in stable dysrhythmia - question of v tach  Scheduled for cardioversion w anesthesia    (+) --CAD, --. : . CHF Last EF: 15 NYHA classification: IV. MIRANDA, orthopnea, . :. dysrhythmias . pulmonary hypertension,      : nicm.   METS/Exercise Tolerance:     Hematologic:     (+) Other Hematologic Disorder-hit pos mario alberto neg s/p plasma exchange       Musculoskeletal:         GI/Hepatic:     (+) GERD      Cholecystitis/cholelithiasis: NICM  Hepatitis: 2/20.   Renal/Genitourinary:     (+) chronic renal disease, type: CRI, Pt does not require dialysis,       Endo:     (+) type II DM Obesity, .      Psychiatric:         Infectious Disease:         Malignancy:         Other:                         PHYSICAL EXAM:   Mental Status/Neuro: A/A/O   Airway:   Mallampati: II  Mouth/Opening: Full  TM distance: > 6 cm  Neck ROM: Full   Respiratory: Auscultation: CTAB     Resp. Rate: Normal     Resp. Effort: Normal      CV: Rhythm: Regular  Rate: Tachy   Comments:      Dental: Normal Dentition                Assessment:   ASA SCORE: 3    H&P: History and physical reviewed and following examination; no interval change.   Smoking Status:  Non-Smoker/Unknown   NPO Status: NPO Appropriate     Plan:   Anes. Type:  General   Pre-Medication: None   Induction:  IV (Standard)   Airway: Native Airway   Access/Monitoring: PIV        Postop Plan:     Postop Sedation/Airway: Not planned  Disposition: Inpatient/Admit     PONV Management:   Adult Risk Factors:, Non-Smoker   Prevention:, No Volatiles     CONSENT: Direct conversation   Plan and risks discussed with: Patient   Blood Products: Consent Deferred (Minimal Blood Loss)                   Vahid Charles MD  Staff Anesthesiologist  [phone]

## 2020-02-28 NOTE — PROGRESS NOTES
Pt arrived in ECHO department for scheduled Cardioversion.   Procedure explained, questions answered and consent signed. Discharge instructions discussed with patient.  Anesthesia gave pt 40 mg IV brevitol for sedation and pt was DCCV at 150 Joules to a SR/ST. Post EKG completed and placed in chart.  Pt denied chest pain or any other discomfort after procedure and was monitored until awake. VSS throughout procedure. Escorted back to  via stretcher, transport staff, and resource nurse.   Report given to KRISTEN Carrasco.

## 2020-02-28 NOTE — ANESTHESIA CARE TRANSFER NOTE
Patient: Eliseo Tanner    Procedure(s):  ANESTHESIA, FOR CARDIOVERSION    Diagnosis: Atrial fibrillation status post cardioversion (H) [I48.91]  Diagnosis Additional Information: No value filed.    Anesthesia Type:   No value filed.     Note:  Airway :Nasal Cannula  Destination: ECHO lab.  Comments: Patient alert/oriented. VSS. SR/. Appears comfortable.       Vitals: (Last set prior to Anesthesia Care Transfer)    CRNA VITALS  2/28/2020 1118 - 2/28/2020 1148      2/28/2020             NIBP:  118/70    Pulse:  100    SpO2:  98 %                Electronically Signed By: JUAN Jack CRNA  February 28, 2020  11:48 AM

## 2020-02-28 NOTE — PROGRESS NOTES
SPIRITUAL HEALTH SERVICES  SPIRITUAL ASSESSMENT Progress Note  [facility] ([place]) 6C     Visited with pt and family - pt was about to go to OR for cardioversiosn. We talked about reason for pt having cardioversion, his hopes regarding the procedure and his concerns/anxieties. Family very supportive and calming. Pt welcomed prayer in anticipation of procedure.     PLAN: continue to follow, checking in on pt at least weekly while he is on unit.    Ad Krause M.Div (Bill)., [facility]  Staff   Pager [phone]

## 2020-02-28 NOTE — PLAN OF CARE
PT / 6C - Pt OOR this AM, pt fatigued from working with OT this PM and requesting to nap. Rescheduled.

## 2020-02-28 NOTE — PLAN OF CARE
"/73   Pulse 117   Temp 96.9  F (36.1  C) (Axillary)   Resp 16   Ht 1.702 m (5' 7\")   Wt 94.6 kg (208 lb 8.9 oz)   SpO2 94%   BMI 32.66 kg/m      Neuro: A&Ox4. Obeys commands. CMS intact.  Cardiac: Heartmate III LVAD. AFIB/Aflutter 110-130. SBP , MAPs >65. Afebrile.  Respiratory: Sating >95% on RA.  GI/: Adequate urine output. No BMs, passing gas.  Diet/appetite: NPO overnight for possible cardioversion today.  Activity:  Assist of 1-2. Ax2 mainly due to lines/tubes.  Pain: At acceptable level on current regimen.   Skin: No new deficits noted.  LDA's: Left PIV. Right PIV. Angiomax infusing @ 0.024 mg/hg/hr. Lasix gtt infusing @ 10mg/hr. Chest tubes x5 to -20mmHg suction.     Plan: Possible cardioversion today. Continue with POC. Notify primary team with changes.   "

## 2020-02-28 NOTE — PROGRESS NOTES
[id number]: A/O x 4. Call light appropriate. VSS on RA. CPAP at HS. SR 90-100s on telemetry since pt returned from cardioversion this morning. HM III LVAD parameter WNL; no alarms. LVAD teaching with family at bedside this am. Adequate UO, using urinal at bedside. LBM 2/28. Good appetite on 2g Na diet. ACHS BGM w/ carb coverage. 2 assist with GB and walker d/t multiple lines. Continue Angiomax @ 0.024 mg/kg/hr. IV abx q6h. Mg+ and K+ replaced with recheck in the am. Will continue to monitor and update Cards 2 with changes.

## 2020-02-28 NOTE — PROGRESS NOTES
Advanced Heart Failure Consult Progress Note  Eliseo Tanner MRN: [id number]  Age: 64 year old, : 1955  Date: 2020        Faculty Attestation  Gucci Tomas M.D.    I personally saw and examined this patient, reviewed recent laboratories and imaging studies, discussed the case with the housestaff, and conveyed plan to the patient.  I answered all questions from patient and/or family. I agree with the examination, assessment and plan outlined here.  He is improving substantially.              Assessment and Plan:     Mr Eliseo Tanner is a 65yo M w/PMHx notable for chronic systolic heart failure, NICM, moderate CAD, HTN, ABHINAV on CPAP, DM2, CKDIII who is transferred to [facility] for evaluation and management of advanced heart failure with arrhythmia now s/p HM 3 on .    Today's plan ():  -Holding lasix as of     -EP consult to assess with diagnosis of wide-complex regular tachycardia --> recommending DCCV for afib/flutter    Moderate CAD  Severe Mitral regurgitation  Chronic nonischemic systolic heart failure w/ reduced EF (15-20%) and exacerbation  New atrial fibrillation  NYHA Class III. Echo 2020: LVEF 15-20%; LVEDd 5.9 cm; 4+ MR. [facility] 2019 w/ nonobstructive CAD w/ severe branch disease. Presented with increased wt gain, SOB, LE edema concerning for HF exacerbation, initially concerning for cardiogenic shock. Admitted to [facility] for further evaluation regarding need for advanced HF therapies. Patient is now s/p HM III placement on  with early post-op course complicated by bleeding that is slowing down as of  AM. Patient is persistently tachycardic to 130-140 with a wide-complex mostly-regular tachycardia. ECG obtained difficult to interpret given LVAD artifact, but afib w/ RVR and aberrancy vs. [facility] vs. AT vs. Slow VT.  -Continue ASA 81, atorvastatin 20 mg  -Continue coumadin with goal INR 2-3 / CFX goal 20-40 (while on Bival)  -LVAD speed at 5500  "RPM  -Holding lasix as of 2/28  -Hydralazine 100 mg po TID    -EP consult to assess with diagnosis of wide-complex regular tachycardia --> recommending DCCV for afib/flutter    Proteus and Enterococcus bacteremia  Organisms growing from 2/2 blood cultures from 2/13. Blood cultured from 2/16 NGTD and 2/15 growing 1/2 S.epi, likely contaminant per ID.  ID consulted, appreciate help. Will decide on final duration of abx.  -daily blood cultures until negative  -Zosyn (2/13-2/14)  -Tobramycin (2/13-2/14)  -Vancomycin (2/13-2/17  -Meropenem (2/14-2/15)  -Ceftriaxone (2/16-2/28)  -Ampicillin (2/16-3/11) (14 days after swan removal on 2/26)    #Thrombocytopenia:  Concern for HIT given timing with respect to heparin exposure. HIT positive DAVINA negative. Antibody is now negative x2, thus no further indication for PLAX  -Heme consulted  -Bivalirudin gtt after LVAD surgery,goal chromogenic level 20-40     VFib requiring shock  Shocked x 3 prior to transfer, loaded with amio. No known prior history. Suspect related to underlying HF.   -Keep K>4, Mg>2  -Amio 400 mg PO QD  -Needs ICD for secondary prevention      CODE: FULL CODE     Stanford Acuna M.D.  Cardiology Fellow              Subjective/Interval Events     No acute overnight events. This AM, patient denying pain, SOB, CP. Reports that he is sleeping better than in prior nights.          Objective     BP (!) 107/92 (BP Location: Left arm)   Pulse 117   Temp 97.7  F (36.5  C) (Oral)   Resp 16   Ht 1.702 m (5' 7\")   Wt 94.6 kg (208 lb 8.9 oz)   SpO2 96%   BMI 32.66 kg/m    Temp:  [96.9  F (36.1  C)-98.9  F (37.2  C)] 97.7  F (36.5  C)  Pulse:  [] 117  Heart Rate:  [118-142] 121  Resp:  [16-18] 16  BP: ()/() 107/92  SpO2:  [93 %-98 %] 96 %  Wt Readings from Last 2 Encounters:   02/28/20 94.6 kg (208 lb 8.9 oz)     I/O last 3 completed shifts:  In: 1295.92 [P.O.:820; I.V.:335.92; NG/GT:60]  Out: 3900 [Urine:3325; Chest Tube:575]      Gen: No acute " distress  HEENT: NC/AT, +JVD   PULM/THORAX: CTAB  CV: normal rate, regular rhythm, normal S1 and S2, LVAD hum  ABD: Soft, NTND, bowel sounds present, no masses  EXT: WWP. Trace LE edema, clubbing or cyanosis.  NEURO: A&Ox3                Data:     Recent Results (from the past 24 hour(s))   EKG 12-lead, tracing only    Collection Time: 02/27/20 10:12 AM   Result Value Ref Range    Interpretation ECG Click View Image link to view waveform and result    Glucose by meter    Collection Time: 02/27/20 12:16 PM   Result Value Ref Range    Glucose 187 (H) 70 - 99 mg/dL   Basic metabolic panel    Collection Time: 02/27/20  3:40 PM   Result Value Ref Range    Sodium 138 133 - 144 mmol/L    Potassium 3.8 3.4 - 5.3 mmol/L    Chloride 97 94 - 109 mmol/L    Carbon Dioxide 37 (H) 20 - 32 mmol/L    Anion Gap 4 3 - 14 mmol/L    Glucose 291 (H) 70 - 99 mg/dL    Urea Nitrogen 42 (H) 7 - 30 mg/dL    Creatinine 1.16 0.66 - 1.25 mg/dL    GFR Estimate 65 >60 mL/min/[1.73_m2]    GFR Estimate If Black 75 >60 mL/min/[1.73_m2]    Calcium 8.1 (L) 8.5 - 10.1 mg/dL   CBC with platelets    Collection Time: 02/27/20  3:40 PM   Result Value Ref Range    WBC 11.0 4.0 - 11.0 10e9/L    RBC Count 3.60 (L) 4.4 - 5.9 10e12/L    Hemoglobin 10.3 (L) 13.3 - 17.7 g/dL    Hematocrit 35.0 (L) 40.0 - 53.0 %    MCV 97 78 - 100 fl    MCH 28.6 26.5 - 33.0 pg    MCHC 29.4 (L) 31.5 - 36.5 g/dL    RDW 18.7 (H) 10.0 - 15.0 %    Platelet Count 465 (H) 150 - 450 10e9/L   Glucose by meter    Collection Time: 02/27/20  4:46 PM   Result Value Ref Range    Glucose 238 (H) 70 - 99 mg/dL   Glucose by meter    Collection Time: 02/27/20  9:14 PM   Result Value Ref Range    Glucose 135 (H) 70 - 99 mg/dL   Glucose by meter    Collection Time: 02/28/20  1:01 AM   Result Value Ref Range    Glucose 126 (H) 70 - 99 mg/dL   Glucose by meter    Collection Time: 02/28/20  4:28 AM   Result Value Ref Range    Glucose 117 (H) 70 - 99 mg/dL   Basic metabolic panel    Collection Time:  02/28/20  4:44 AM   Result Value Ref Range    Sodium 139 133 - 144 mmol/L    Potassium 3.4 3.4 - 5.3 mmol/L    Chloride 98 94 - 109 mmol/L    Carbon Dioxide 37 (H) 20 - 32 mmol/L    Anion Gap 4 3 - 14 mmol/L    Glucose 135 (H) 70 - 99 mg/dL    Urea Nitrogen 40 (H) 7 - 30 mg/dL    Creatinine 1.48 (H) 0.66 - 1.25 mg/dL    GFR Estimate 48 (L) >60 mL/min/[1.73_m2]    GFR Estimate If Black 56 (L) >60 mL/min/[1.73_m2]    Calcium 8.6 8.5 - 10.1 mg/dL   CBC with platelets    Collection Time: 02/28/20  4:44 AM   Result Value Ref Range    WBC 11.7 (H) 4.0 - 11.0 10e9/L    RBC Count 3.64 (L) 4.4 - 5.9 10e12/L    Hemoglobin 10.4 (L) 13.3 - 17.7 g/dL    Hematocrit 35.0 (L) 40.0 - 53.0 %    MCV 96 78 - 100 fl    MCH 28.6 26.5 - 33.0 pg    MCHC 29.7 (L) 31.5 - 36.5 g/dL    RDW 18.8 (H) 10.0 - 15.0 %    Platelet Count 486 (H) 150 - 450 10e9/L   Factor 10 chromogenic    Collection Time: 02/28/20  4:44 AM   Result Value Ref Range    Chromogenic Factor 10 68 (L) 70 - 130 %   INR    Collection Time: 02/28/20  4:44 AM   Result Value Ref Range    INR 1.98 (H) 0.86 - 1.14   Partial thromboplastin time    Collection Time: 02/28/20  4:44 AM   Result Value Ref Range    PTT 72 (H) 22 - 37 sec   Magnesium    Collection Time: 02/28/20  4:44 AM   Result Value Ref Range    Magnesium 1.8 1.6 - 2.3 mg/dL   Lactic acid level STAT    Collection Time: 02/28/20  4:44 AM   Result Value Ref Range    Lactate for Sepsis Protocol 1.4 0.7 - 2.0 mmol/L   Phosphorus    Collection Time: 02/28/20  4:44 AM   Result Value Ref Range    Phosphorus 4.4 2.5 - 4.5 mg/dL   Glucose by meter    Collection Time: 02/28/20  6:04 AM   Result Value Ref Range    Glucose 134 (H) 70 - 99 mg/dL       Recent Results (from the past 24 hour(s))   Echocardiogram Complete    Narrative    [id number]  [id number]  [id number]  [id number]^MATT^NAHUN^JIM           [facility],[place]  Echocardiography Laboratory  [address] [place] [id number]     Name: WOLFGANG LEACH  KERI  MRN: [id number]  : 1955  Study Date: 2020 10:06 AM  Age: 64 yrs  Gender: Male  Patient Location: [facility]  Reason For Study: Heart Failure  Ordering Physician: NAHUN GUTIERREZ  Referring Physician: SELF, REFERRED  Performed By: Yvonne Adan RDCS     BSA: 2.2 m2  Height: 67 in  Weight: 244 lb  HR: 103  BP: 72/52 mmHg  _____________________________________________________________________________  __        Procedure  Complete Portable Echo Adult. Contrast Optison. Optison (NDC #9833-3720-82)  given intravenously. Patient was given 6 ml mixture of 3 ml Optison and 6 ml  saline. 3 ml wasted.  _____________________________________________________________________________  __        Interpretation Summary  Left ventricular size is normal.  LVEF 20% based on biplane 2D tracing.  Mild to moderate right ventricular dilation is present.  Global right ventricular function is moderately reduced.  Pulmonary artery systolic pressure is normal.  Dilation of the inferior vena cava is present with abnormal respiratory  variation in diameter.  _____________________________________________________________________________  __        Left Ventricle  Left ventricular size is normal. LVEF 20% based on biplane 2D tracing. Left  ventricular wall thickness is normal. Diastolic function not assessed due to  frequent ectopy. Abnormal septal motion consistent with left bundle branch  block is present.     Right Ventricle  Mild to moderate right ventricular dilation is present. Global right  ventricular function is moderately reduced.     Atria  Mild biatrial enlargement is present.     Mitral Valve  Moderate mitral insufficiency is present.        Aortic Valve  Aortic valve is normal in structure and function.     Tricuspid Valve  Moderate tricuspid insufficiency is present. The right ventricular systolic  pressure is approximated at 24.4 mmHg plus the right atrial pressure.  Pulmonary artery systolic pressure is normal.      Pulmonic Valve  The pulmonic valve cannot be assessed. Mild pulmonic insufficiency is present.     Vessels  The aorta root is normal. The pulmonary artery cannot be assessed. Dilation of  the inferior vena cava is present with abnormal respiratory variation in  diameter.     Compared to Previous Study  Previous study not available for comparison.     _____________________________________________________________________________  __  MMode/2D Measurements & Calculations  IVSd: 0.61 cm  LVIDd: 4.7 cm  LVIDs: 3.7 cm  LVPWd: 0.75 cm  FS: 21.7 %  LV mass(C)d: 99.1 grams  LV mass(C)dI: 45.0 grams/m2     Ao root diam: 2.9 cm  asc Aorta Diam: 2.5 cm  LVOT diam: 1.9 cm  LVOT area: 2.8 cm2     EF(MOD-bp): 21.5 %  LA Volume (BP): 84.8 ml  LA Volume Index (BP): 38.5 ml/m2  RWT: 0.32        Doppler Measurements & Calculations  PA acc time: 0.09 sec     PI end-d saumya: 154.0 cm/sec  TR max saumya: 247.0 cm/sec  TR max P.4 mmHg     _____________________________________________________________________________  __           Report approved by: Graciela Tomlinson 2020 12:05 PM      XR Chest Port 1 View    Narrative    XR CHEST PORT 1 VW  2020 4:21 PM      HISTORY: SG Placement    COMPARISON: None available    FINDINGS: Single AP chest radiograph. [place] catheter tip is in the  proximal right main pulmonary artery. Right central line tip and right  upper extremity PICC line tip at the lower SVC. Trachea is midline,  cardiomegaly. Bilateral perihilar streaky opacities. Mild increased  interstitial opacities in the right lung with ill-defined pulmonary  vascularity. No pneumothorax or pleural effusion. No acute osseous or  abdominal abnormality. Elevated left hemidiaphragm.      Impression    IMPRESSION:  1. [place] catheter tip at the proximal right main pulmonary artery.  2. Cardiomegaly with mild pulmonary edema, especially on the right.    I have personally reviewed the examination and initial interpretation  and I  agree with the findings.    DONG SOLORIO MD   Cardiac Catheterization    Narrative      Successful insertion of a IABP in the RFA                Medications     Current Facility-Administered Medications   Medication     acetaminophen (TYLENOL) tablet 650 mg     albuterol (PROAIR HFA/PROVENTIL HFA/VENTOLIN HFA) 108 (90 Base) MCG/ACT inhaler 2 puff     allopurinol (ZYLOPRIM) tablet 200 mg     amiodarone (PACERONE) tablet 400 mg    Followed by     [START ON 3/8/2020] amiodarone (PACERONE) tablet 200 mg     ampicillin (OMNIPEN) 2 g vial to attach to  ml bag     aspirin (ASA) chewable tablet 81 mg     atorvastatin (LIPITOR) tablet 20 mg     bivalirudin (ANGIOMAX) 250 mg in sodium chloride 0.9 % 250 mL ANTICOAGULANT infusion     calcium carbonate (TUMS) chewable tablet 500 mg     cefTRIAXone (ROCEPHIN) 2 g vial to attach to  ml bag for ADULTS or NS 50 ml bag for PEDS     co-enzyme Q-10 capsule 200 mg     dextrose 10% infusion     glucose gel 15-30 g    Or     dextrose 50 % injection 25-50 mL    Or     glucagon injection 1 mg     diclofenac (VOLTAREN) 1 % topical gel 4 g     digoxin (LANOXIN) tablet 125 mcg     FLUoxetine (PROzac) solution 20 mg     gabapentin (NEURONTIN) capsule 100 mg     hydrALAZINE (APRESOLINE) tablet 100 mg     HYDROmorphone (DILAUDID) tablet 2 mg     insulin aspart (NovoLOG) injection (RAPID ACTING)     insulin aspart (NovoLOG) injection (RAPID ACTING)     insulin glargine (LANTUS PEN) injection 8 Units     lidocaine (LMX4) cream     lidocaine 1 % 0.1-1 mL     magnesium oxide (MAG-OX) tablet 400 mg     magnesium sulfate 2 g in water intermittent infusion     magnesium sulfate 4 g in 100 mL sterile water (premade)     medication instruction     melatonin tablet 3 mg     methocarbamol (ROBAXIN) tablet 500 mg     multivitamin w/minerals (THERA-VIT-M) tablet 1 tablet     naloxone (NARCAN) injection 0.1-0.4 mg     pantoprazole (PROTONIX) 2 mg/mL suspension 40 mg     polyethylene  glycol (MIRALAX) Packet 17 g     potassium chloride (KLOR-CON) Packet 20-40 mEq     potassium chloride 10 mEq in 100 mL intermittent infusion with 10 mg lidocaine     potassium chloride 10 mEq in 100 mL sterile water intermittent infusion (premix)     potassium chloride 20 mEq in 50 mL intermittent infusion     potassium chloride ER (KLOR-CON M) CR tablet 20-40 mEq     potassium phosphate 10 mmol in D5W 250 mL intermittent infusion     potassium phosphate 15 mmol in D5W 250 mL intermittent infusion     potassium phosphate 20 mmol in D5W 250 mL intermittent infusion     potassium phosphate 20 mmol in D5W 500 mL intermittent infusion     potassium phosphate 25 mmol in D5W 500 mL intermittent infusion     QUEtiapine (SEROquel) tablet 25 mg     Reason beta blocker order not selected     senna-docusate (SENOKOT-S/PERICOLACE) 8.6-50 MG per tablet 1 tablet    Or     senna-docusate (SENOKOT-S/PERICOLACE) 8.6-50 MG per tablet 2 tablet     sodium chloride (PF) 0.9% PF flush 3 mL     sodium chloride (PF) 0.9% PF flush 3 mL     Warfarin Therapy Reminder (Check START DATE - warfarin may be starting in the FUTURE)

## 2020-02-28 NOTE — OP NOTE
CARDIOVERSION    PROCEDURE:  direct current cardioversion  PROCEDURE DATE: 2/28/2020     Pre-procedure diagnosis:  Atrial flutter  Post-procedure diagnosis: Same  Complications:  none    BRIEF CLINICAL HISTORY:     Mr. Tanner is a 64 year old male who has a past medical history significant for NICM LVEF 15-20%, moderate nonobstructive CAD, severe MR, HTN, ABHINAV (uses CPAP), DM2, CKD III, HLD, and prior tobacco use.   He started having worsening SOB/MIRANDA in 9/2019 which progressively worsened. In 10/2019 he was admitted to OSH and was found to have newly reduced LVEF down to 25-30% with severe MR. He was diuresed about 8 lbs until his SCr bumped. He was started on HF medications. He underwent a CHAMP which showed severe MR and then a coronary angiogram which showed mild/moderate CAD with severe branch disease and elevated LVEDP. He was admitted and underwent diuresis again. He was then admitted in 1/2020 with HF exacerbation found to be in cardiogenic shock.  He was loaded with amiodarone, ASA 324mg. He ultimately required LVAD placement. He is doing overall well except for persistent atrial tachycardia      PROCEDURE:  The patient arrived at the Echo Laboratory in a fasting, non-sedated state.  Informed consent was previously obtained from patient, who understood indications, risks, and benefits of the procedure. Patient is anticoagulated withLVAD   With help from Anesthesia, patient was deeply sedated.  150J of synchronized biphasic shock was delivered through the cardiac monitor, and the patient was successfully converted to normal sinus rhythm.  After sedation wore off, patient awoke and remained neurologically intact.  The patient tolerated the procedure well from a hemodynamic and respiratory standpoint without any complications.  He was observed in the Echo Laboratory and then returned to cards station    IMPRESSION:  1.  Successful direct current cardioversion with 150J biphasic shock.    RECOMMENDATIONS/PLANS:  1.    Follow-up  as planned by in-house team  2.   Continue anticoagulation    I appreciated the opportunity to see and assess Mr Tanner and direct the procedure described above . Please do not hesitate to contact me if you have any questions or concerns.    Jamar Butler MD [facility]   Pager [phone]  Office [phone]

## 2020-02-29 ENCOUNTER — APPOINTMENT (OUTPATIENT)
Dept: SPEECH THERAPY | Facility: CLINIC | Age: 65
DRG: 001 | End: 2020-02-29
Attending: INTERNAL MEDICINE
Payer: MEDICARE

## 2020-02-29 ENCOUNTER — APPOINTMENT (OUTPATIENT)
Dept: GENERAL RADIOLOGY | Facility: CLINIC | Age: 65
DRG: 001 | End: 2020-02-29
Attending: PHYSICIAN ASSISTANT
Payer: MEDICARE

## 2020-02-29 LAB
ANION GAP SERPL CALCULATED.3IONS-SCNC: 4 MMOL/L (ref 3–14)
APTT PPP: 87 SEC (ref 22–37)
BUN SERPL-MCNC: 36 MG/DL (ref 7–30)
CALCIUM SERPL-MCNC: 8.2 MG/DL (ref 8.5–10.1)
CHLORIDE SERPL-SCNC: 99 MMOL/L (ref 94–109)
CO2 SERPL-SCNC: 36 MMOL/L (ref 20–32)
CREAT SERPL-MCNC: 1.29 MG/DL (ref 0.66–1.25)
ERYTHROCYTE [DISTWIDTH] IN BLOOD BY AUTOMATED COUNT: 18.4 % (ref 10–15)
FACT X ACT/NOR PPP CHRO: 51 % (ref 70–130)
GFR SERPL CREATININE-BSD FRML MDRD: 57 ML/MIN/{1.73_M2}
GLUCOSE BLDC GLUCOMTR-MCNC: 128 MG/DL (ref 70–99)
GLUCOSE BLDC GLUCOMTR-MCNC: 135 MG/DL (ref 70–99)
GLUCOSE BLDC GLUCOMTR-MCNC: 170 MG/DL (ref 70–99)
GLUCOSE BLDC GLUCOMTR-MCNC: 193 MG/DL (ref 70–99)
GLUCOSE BLDC GLUCOMTR-MCNC: 202 MG/DL (ref 70–99)
GLUCOSE SERPL-MCNC: 131 MG/DL (ref 70–99)
HCT VFR BLD AUTO: 31.6 % (ref 40–53)
HGB BLD-MCNC: 9.5 G/DL (ref 13.3–17.7)
INR PPP: 2.52 (ref 0.86–1.14)
MAGNESIUM SERPL-MCNC: 2.3 MG/DL (ref 1.6–2.3)
MCH RBC QN AUTO: 29.1 PG (ref 26.5–33)
MCHC RBC AUTO-ENTMCNC: 30.1 G/DL (ref 31.5–36.5)
MCV RBC AUTO: 97 FL (ref 78–100)
PLATELET # BLD AUTO: 457 10E9/L (ref 150–450)
POTASSIUM SERPL-SCNC: 3.3 MMOL/L (ref 3.4–5.3)
POTASSIUM SERPL-SCNC: 3.7 MMOL/L (ref 3.4–5.3)
RBC # BLD AUTO: 3.27 10E12/L (ref 4.4–5.9)
SODIUM SERPL-SCNC: 139 MMOL/L (ref 133–144)
WBC # BLD AUTO: 9.5 10E9/L (ref 4–11)

## 2020-02-29 PROCEDURE — 25000132 ZZH RX MED GY IP 250 OP 250 PS 637: Mod: GY | Performed by: STUDENT IN AN ORGANIZED HEALTH CARE EDUCATION/TRAINING PROGRAM

## 2020-02-29 PROCEDURE — 36415 COLL VENOUS BLD VENIPUNCTURE: CPT | Performed by: THORACIC SURGERY (CARDIOTHORACIC VASCULAR SURGERY)

## 2020-02-29 PROCEDURE — 85610 PROTHROMBIN TIME: CPT | Performed by: THORACIC SURGERY (CARDIOTHORACIC VASCULAR SURGERY)

## 2020-02-29 PROCEDURE — 25000132 ZZH RX MED GY IP 250 OP 250 PS 637: Mod: GY | Performed by: THORACIC SURGERY (CARDIOTHORACIC VASCULAR SURGERY)

## 2020-02-29 PROCEDURE — 83735 ASSAY OF MAGNESIUM: CPT | Performed by: PHYSICIAN ASSISTANT

## 2020-02-29 PROCEDURE — 00000146 ZZHCL STATISTIC GLUCOSE BY METER IP

## 2020-02-29 PROCEDURE — 85027 COMPLETE CBC AUTOMATED: CPT | Performed by: PHYSICIAN ASSISTANT

## 2020-02-29 PROCEDURE — 25000128 H RX IP 250 OP 636: Performed by: STUDENT IN AN ORGANIZED HEALTH CARE EDUCATION/TRAINING PROGRAM

## 2020-02-29 PROCEDURE — 99233 SBSQ HOSP IP/OBS HIGH 50: CPT | Mod: GC | Performed by: INTERNAL MEDICINE

## 2020-02-29 PROCEDURE — 80048 BASIC METABOLIC PNL TOTAL CA: CPT | Performed by: PHYSICIAN ASSISTANT

## 2020-02-29 PROCEDURE — 84132 ASSAY OF SERUM POTASSIUM: CPT | Performed by: THORACIC SURGERY (CARDIOTHORACIC VASCULAR SURGERY)

## 2020-02-29 PROCEDURE — 85730 THROMBOPLASTIN TIME PARTIAL: CPT | Performed by: THORACIC SURGERY (CARDIOTHORACIC VASCULAR SURGERY)

## 2020-02-29 PROCEDURE — 21400000 ZZH R&B CCU UMMC

## 2020-02-29 PROCEDURE — 85260 CLOT FACTOR X STUART-POWER: CPT | Performed by: PHYSICIAN ASSISTANT

## 2020-02-29 PROCEDURE — 92526 ORAL FUNCTION THERAPY: CPT | Mod: GN

## 2020-02-29 PROCEDURE — 36415 COLL VENOUS BLD VENIPUNCTURE: CPT | Performed by: PHYSICIAN ASSISTANT

## 2020-02-29 PROCEDURE — 40000275 ZZH STATISTIC RCP TIME EA 10 MIN

## 2020-02-29 PROCEDURE — 71045 X-RAY EXAM CHEST 1 VIEW: CPT

## 2020-02-29 PROCEDURE — 25000132 ZZH RX MED GY IP 250 OP 250 PS 637: Mod: GY

## 2020-02-29 PROCEDURE — 25800030 ZZH RX IP 258 OP 636: Performed by: STUDENT IN AN ORGANIZED HEALTH CARE EDUCATION/TRAINING PROGRAM

## 2020-02-29 RX ORDER — LISINOPRIL 5 MG/1
5 TABLET ORAL DAILY
Status: DISCONTINUED | OUTPATIENT
Start: 2020-02-29 | End: 2020-03-04

## 2020-02-29 RX ORDER — PANTOPRAZOLE SODIUM 40 MG/1
40 TABLET, DELAYED RELEASE ORAL
Status: DISCONTINUED | OUTPATIENT
Start: 2020-02-29 | End: 2020-03-20 | Stop reason: HOSPADM

## 2020-02-29 RX ADMIN — POTASSIUM CHLORIDE 40 MEQ: 750 TABLET, EXTENDED RELEASE ORAL at 09:36

## 2020-02-29 RX ADMIN — INSULIN ASPART 3 UNITS: 100 INJECTION, SOLUTION INTRAVENOUS; SUBCUTANEOUS at 18:12

## 2020-02-29 RX ADMIN — AMPICILLIN SODIUM 2 G: 2 INJECTION, POWDER, FOR SOLUTION INTRAMUSCULAR; INTRAVENOUS at 19:51

## 2020-02-29 RX ADMIN — DIGOXIN 125 MCG: 125 TABLET ORAL at 09:11

## 2020-02-29 RX ADMIN — MULTIPLE VITAMINS W/ MINERALS TAB 1 TABLET: TAB at 09:11

## 2020-02-29 RX ADMIN — AMPICILLIN SODIUM 2 G: 2 INJECTION, POWDER, FOR SOLUTION INTRAMUSCULAR; INTRAVENOUS at 09:28

## 2020-02-29 RX ADMIN — HYDRALAZINE HYDROCHLORIDE 100 MG: 100 TABLET, FILM COATED ORAL at 09:10

## 2020-02-29 RX ADMIN — ALLOPURINOL 200 MG: 100 TABLET ORAL at 09:10

## 2020-02-29 RX ADMIN — HYDRALAZINE HYDROCHLORIDE 100 MG: 100 TABLET, FILM COATED ORAL at 00:26

## 2020-02-29 RX ADMIN — AMPICILLIN SODIUM 2 G: 2 INJECTION, POWDER, FOR SOLUTION INTRAMUSCULAR; INTRAVENOUS at 14:16

## 2020-02-29 RX ADMIN — Medication 200 MG: at 09:10

## 2020-02-29 RX ADMIN — FLUOXETINE 20 MG: 20 CAPSULE ORAL at 14:16

## 2020-02-29 RX ADMIN — POTASSIUM CHLORIDE 20 MEQ: 750 TABLET, EXTENDED RELEASE ORAL at 16:09

## 2020-02-29 RX ADMIN — MAGNESIUM OXIDE 400 MG: 400 TABLET ORAL at 09:11

## 2020-02-29 RX ADMIN — AMIODARONE HYDROCHLORIDE 400 MG: 200 TABLET ORAL at 09:10

## 2020-02-29 RX ADMIN — WARFARIN SODIUM 7 MG: 3 TABLET ORAL at 18:12

## 2020-02-29 RX ADMIN — INSULIN ASPART 2 UNITS: 100 INJECTION, SOLUTION INTRAVENOUS; SUBCUTANEOUS at 22:11

## 2020-02-29 RX ADMIN — INSULIN ASPART 3 UNITS: 100 INJECTION, SOLUTION INTRAVENOUS; SUBCUTANEOUS at 13:20

## 2020-02-29 RX ADMIN — HYDRALAZINE HYDROCHLORIDE 100 MG: 100 TABLET, FILM COATED ORAL at 16:09

## 2020-02-29 RX ADMIN — HYDRALAZINE HYDROCHLORIDE 100 MG: 100 TABLET, FILM COATED ORAL at 23:56

## 2020-02-29 RX ADMIN — LISINOPRIL 5 MG: 5 TABLET ORAL at 13:19

## 2020-02-29 RX ADMIN — PANTOPRAZOLE SODIUM 40 MG: 40 TABLET, DELAYED RELEASE ORAL at 14:16

## 2020-02-29 RX ADMIN — ATORVASTATIN CALCIUM 20 MG: 20 TABLET, FILM COATED ORAL at 19:53

## 2020-02-29 RX ADMIN — ASPIRIN 81 MG CHEWABLE TABLET 81 MG: 81 TABLET CHEWABLE at 09:09

## 2020-02-29 RX ADMIN — POTASSIUM CHLORIDE 20 MEQ: 750 TABLET, EXTENDED RELEASE ORAL at 12:12

## 2020-02-29 RX ADMIN — BIVALIRUDIN 0.02 MG/KG/HR: 250 INJECTION, POWDER, LYOPHILIZED, FOR SOLUTION INTRAVENOUS at 16:54

## 2020-02-29 ASSESSMENT — ACTIVITIES OF DAILY LIVING (ADL)
ADLS_ACUITY_SCORE: 16

## 2020-02-29 ASSESSMENT — MIFFLIN-ST. JEOR: SCORE: 1676.63

## 2020-02-29 NOTE — PLAN OF CARE
D/continues on ATB, and Angiomax drip with INR of 1.98 on coumadin. He wakes with vitals, but declines med for sleep, and appears to sleep between cares.  A/slept between cares  P/more teaching today

## 2020-02-29 NOTE — PROGRESS NOTES
Advanced Heart Failure Consult Progress Note  Eliseo Tanner MRN: [id number]  Age: 64 year old, : 1955  Date: 2020        Faculty Attestation  Gucci Tomas M.D.    I personally saw and examined this patient, reviewed recent laboratories and imaging studies, discussed the case with the housestaff, and conveyed plan to the patient.  I answered all questions from patient and/or family. I agree with the examination, assessment and plan outlined here.            Assessment and Plan:     Mr Eliseo Tanner is a 63yo M w/PMHx notable for chronic systolic heart failure, NICM, moderate CAD, HTN, ABHINAV on CPAP, DM2, CKDIII who is transferred to [facility] for evaluation and management of advanced heart failure with arrhythmia now s/p HM 3 on .    Today's plan ():  -Holding lasix as of , still net negative, weight still down  -Start lisinopril 5 mg PO every day  -Still need ICD prior to discharge    Moderate CAD  Severe Mitral regurgitation  Chronic nonischemic systolic heart failure w/ reduced EF (15-20%) and exacerbation  New atrial fibrillation  NYHA Class III. Echo 2020: LVEF 15-20%; LVEDd 5.9 cm; 4+ MR. LHC 2019 w/ nonobstructive CAD w/ severe branch disease. Presented with increased wt gain, SOB, LE edema concerning for HF exacerbation, initially concerning for cardiogenic shock. Admitted to [facility] for further evaluation regarding need for advanced HF therapies. Patient is now s/p HM III placement on  with early post-op course complicated by bleeding that is slowing down as of  AM. Patient is persistently tachycardic to 130-140 with a wide-complex mostly-regular tachycardia. ECG obtained difficult to interpret given LVAD artifact, but afib w/ RVR and aberrancy vs. penitentiary vs. AT vs. Slow VT. S/p DCCV on  back into sinus rhythm.  -Continue ASA 81, atorvastatin 20 mg  -Continue coumadin with goal INR 2-3 / CFX goal 20-40 (while on Bival)  -LVAD speed at 5500  "RPM  -Holding lasix as of 2/29, may need some diuresis on 3/1  -Hydralazine 100 mg po TID      Proteus and Enterococcus bacteremia  Organisms growing from 2/2 blood cultures from 2/13. Blood cultured from 2/16 NGTD and 2/15 growing 1/2 S.epi, likely contaminant per ID.  ID consulted, appreciate help. Will decide on final duration of abx.  -daily blood cultures until negative  -Zosyn (2/13-2/14)  -Tobramycin (2/13-2/14)  -Vancomycin (2/13-2/17  -Meropenem (2/14-2/15)  -Ceftriaxone (2/16-2/28)  -Ampicillin (2/16-3/11) (14 days after swan removal on 2/26)    #Thrombocytopenia:  Concern for HIT given timing with respect to heparin exposure. HIT positive DAVINA negative. Antibody is now negative x2, thus no further indication for PLAX  -Heme consulted  -Bivalirudin gtt after LVAD surgery,goal chromogenic level 20-40     VFib requiring shock  Shocked x 3 prior to transfer, loaded with amio. No known prior history. Suspect related to underlying HF.   -Keep K>4, Mg>2  -Amio 400 mg PO QD  -Needs ICD for secondary prevention      CODE: FULL CODE     Stanford Acuna M.D.  Cardiology Fellow              Subjective/Interval Events     No acute overnight events. This AM, patient denying pain, SOB, CP. Reports that he is sleeping better than in prior nights.          Objective     BP (!) 120/95 (BP Location: Right arm)   Pulse 91   Temp 98.2  F (36.8  C) (Oral)   Resp 18   Ht 1.702 m (5' 7\")   Wt 93.8 kg (206 lb 12.7 oz)   SpO2 97%   BMI 32.39 kg/m    Temp:  [97.4  F (36.3  C)-99  F (37.2  C)] 98.2  F (36.8  C)  Pulse:  [91] 91  Heart Rate:  [] 96  Resp:  [16-18] 18  BP: ()/(72-95) 120/95  SpO2:  [94 %-97 %] 97 %  Wt Readings from Last 2 Encounters:   02/29/20 93.8 kg (206 lb 12.7 oz)     I/O last 3 completed shifts:  In: 1019 [P.O.:680; I.V.:339]  Out: 2185 [Urine:1980; Chest Tube:205]      Gen: No acute distress  HEENT: NC/AT, +JVD   PULM/THORAX: CTAB  CV: normal rate, regular rhythm, normal S1 and S2, LVAD " hum  ABD: Soft, NTND, bowel sounds present, no masses  EXT: WWP. Trace LE edema, clubbing or cyanosis.  NEURO: A&Ox3                Data:     Recent Results (from the past 24 hour(s))   Glucose by meter    Collection Time: 02/28/20 12:38 PM   Result Value Ref Range    Glucose 153 (H) 70 - 99 mg/dL   Glucose by meter    Collection Time: 02/28/20  6:19 PM   Result Value Ref Range    Glucose 235 (H) 70 - 99 mg/dL   Glucose by meter    Collection Time: 02/28/20  9:32 PM   Result Value Ref Range    Glucose 167 (H) 70 - 99 mg/dL   Glucose by meter    Collection Time: 02/29/20  2:17 AM   Result Value Ref Range    Glucose 128 (H) 70 - 99 mg/dL   Basic metabolic panel    Collection Time: 02/29/20  6:06 AM   Result Value Ref Range    Sodium 139 133 - 144 mmol/L    Potassium 3.3 (L) 3.4 - 5.3 mmol/L    Chloride 99 94 - 109 mmol/L    Carbon Dioxide 36 (H) 20 - 32 mmol/L    Anion Gap 4 3 - 14 mmol/L    Glucose 131 (H) 70 - 99 mg/dL    Urea Nitrogen 36 (H) 7 - 30 mg/dL    Creatinine 1.29 (H) 0.66 - 1.25 mg/dL    GFR Estimate 57 (L) >60 mL/min/[1.73_m2]    GFR Estimate If Black 66 >60 mL/min/[1.73_m2]    Calcium 8.2 (L) 8.5 - 10.1 mg/dL   CBC with platelets    Collection Time: 02/29/20  6:06 AM   Result Value Ref Range    WBC 9.5 4.0 - 11.0 10e9/L    RBC Count 3.27 (L) 4.4 - 5.9 10e12/L    Hemoglobin 9.5 (L) 13.3 - 17.7 g/dL    Hematocrit 31.6 (L) 40.0 - 53.0 %    MCV 97 78 - 100 fl    MCH 29.1 26.5 - 33.0 pg    MCHC 30.1 (L) 31.5 - 36.5 g/dL    RDW 18.4 (H) 10.0 - 15.0 %    Platelet Count 457 (H) 150 - 450 10e9/L   Magnesium    Collection Time: 02/29/20  6:06 AM   Result Value Ref Range    Magnesium 2.3 1.6 - 2.3 mg/dL   Factor 10 chromogenic    Collection Time: 02/29/20  6:06 AM   Result Value Ref Range    Chromogenic Factor 10 51 (L) 70 - 130 %   Partial thromboplastin time    Collection Time: 02/29/20  6:06 AM   Result Value Ref Range    PTT 87 (H) 22 - 37 sec   INR    Collection Time: 02/29/20  6:06 AM   Result Value Ref  Range    INR 2.52 (H) 0.86 - 1.14   Glucose by meter    Collection Time: 20  8:25 AM   Result Value Ref Range    Glucose 135 (H) 70 - 99 mg/dL       Recent Results (from the past 24 hour(s))   Echocardiogram Complete    Narrative    [id number]  [id number]  [id number]  [id number]^MATT^NAHUN^JIM           [facility],Isabella  Echocardiography Laboratory  [address] [place] [id number]     Name: WOLFGANG LEACH  MRN: [id number]  : 1955  Study Date: 2020 10:06 AM  Age: 64 yrs  Gender: Male  Patient Location: [facility]  Reason For Study: Heart Failure  Ordering Physician: NAHUN GUTIERREZ  Referring Physician: SELF, REFERRED  Performed By: Yvonne Adan RDCS     BSA: 2.2 m2  Height: 67 in  Weight: 244 lb  HR: 103  BP: 72/52 mmHg  _____________________________________________________________________________  __        Procedure  Complete Portable Echo Adult. Contrast Optison. Optison (NDC #8186-8385-91)  given intravenously. Patient was given 6 ml mixture of 3 ml Optison and 6 ml  saline. 3 ml wasted.  _____________________________________________________________________________  __        Interpretation Summary  Left ventricular size is normal.  LVEF 20% based on biplane 2D tracing.  Mild to moderate right ventricular dilation is present.  Global right ventricular function is moderately reduced.  Pulmonary artery systolic pressure is normal.  Dilation of the inferior vena cava is present with abnormal respiratory  variation in diameter.  _____________________________________________________________________________  __        Left Ventricle  Left ventricular size is normal. LVEF 20% based on biplane 2D tracing. Left  ventricular wall thickness is normal. Diastolic function not assessed due to  frequent ectopy. Abnormal septal motion consistent with left bundle branch  block is present.     Right Ventricle  Mild to moderate right ventricular dilation is present. Global  right  ventricular function is moderately reduced.     Atria  Mild biatrial enlargement is present.     Mitral Valve  Moderate mitral insufficiency is present.        Aortic Valve  Aortic valve is normal in structure and function.     Tricuspid Valve  Moderate tricuspid insufficiency is present. The right ventricular systolic  pressure is approximated at 24.4 mmHg plus the right atrial pressure.  Pulmonary artery systolic pressure is normal.     Pulmonic Valve  The pulmonic valve cannot be assessed. Mild pulmonic insufficiency is present.     Vessels  The aorta root is normal. The pulmonary artery cannot be assessed. Dilation of  the inferior vena cava is present with abnormal respiratory variation in  diameter.     Compared to Previous Study  Previous study not available for comparison.     _____________________________________________________________________________  __  MMode/2D Measurements & Calculations  IVSd: 0.61 cm  LVIDd: 4.7 cm  LVIDs: 3.7 cm  LVPWd: 0.75 cm  FS: 21.7 %  LV mass(C)d: 99.1 grams  LV mass(C)dI: 45.0 grams/m2     Ao root diam: 2.9 cm  asc Aorta Diam: 2.5 cm  LVOT diam: 1.9 cm  LVOT area: 2.8 cm2     EF(MOD-bp): 21.5 %  LA Volume (BP): 84.8 ml  LA Volume Index (BP): 38.5 ml/m2  RWT: 0.32        Doppler Measurements & Calculations  PA acc time: 0.09 sec     PI end-d saumya: 154.0 cm/sec  TR max saumya: 247.0 cm/sec  TR max P.4 mmHg     _____________________________________________________________________________  __           Report approved by: Graciela Tomlinson 2020 12:05 PM      XR Chest Port 1 View    Narrative    XR CHEST PORT 1 VW  2020 4:21 PM      HISTORY: SG Placement    COMPARISON: None available    FINDINGS: Single AP chest radiograph. [place] catheter tip is in the  proximal right main pulmonary artery. Right central line tip and right  upper extremity PICC line tip at the lower SVC. Trachea is midline,  cardiomegaly. Bilateral perihilar streaky opacities. Mild  increased  interstitial opacities in the right lung with ill-defined pulmonary  vascularity. No pneumothorax or pleural effusion. No acute osseous or  abdominal abnormality. Elevated left hemidiaphragm.      Impression    IMPRESSION:  1. [place] catheter tip at the proximal right main pulmonary artery.  2. Cardiomegaly with mild pulmonary edema, especially on the right.    I have personally reviewed the examination and initial interpretation  and I agree with the findings.    DONG SOLORIO MD   Cardiac Catheterization    Narrative      Successful insertion of a IABP in the RFA                Medications     Current Facility-Administered Medications   Medication     acetaminophen (TYLENOL) tablet 650 mg     albuterol (PROAIR HFA/PROVENTIL HFA/VENTOLIN HFA) 108 (90 Base) MCG/ACT inhaler 2 puff     allopurinol (ZYLOPRIM) tablet 200 mg     amiodarone (PACERONE) tablet 400 mg    Followed by     [START ON 3/8/2020] amiodarone (PACERONE) tablet 200 mg     ampicillin (OMNIPEN) 2 g vial to attach to  ml bag     aspirin (ASA) chewable tablet 81 mg     atorvastatin (LIPITOR) tablet 20 mg     bivalirudin (ANGIOMAX) 250 mg in sodium chloride 0.9 % 250 mL ANTICOAGULANT infusion     calcium carbonate (TUMS) chewable tablet 500 mg     co-enzyme Q-10 capsule 200 mg     dextrose 10% infusion     glucose gel 15-30 g    Or     dextrose 50 % injection 25-50 mL    Or     glucagon injection 1 mg     diclofenac (VOLTAREN) 1 % topical gel 4 g     digoxin (LANOXIN) tablet 125 mcg     FLUoxetine (PROzac) solution 20 mg     hydrALAZINE (APRESOLINE) tablet 100 mg     HYDROmorphone (DILAUDID) tablet 2 mg     insulin aspart (NovoLOG) injection (RAPID ACTING)     insulin aspart (NovoLOG) injection (RAPID ACTING)     insulin glargine (LANTUS PEN) injection 8 Units     lidocaine (LMX4) cream     lidocaine 1 % 0.1-1 mL     lisinopril (ZESTRIL) tablet 5 mg     magnesium oxide (MAG-OX) tablet 400 mg     magnesium sulfate 2 g in water  intermittent infusion     magnesium sulfate 4 g in 100 mL sterile water (premade)     medication instruction     melatonin tablet 3 mg     methocarbamol (ROBAXIN) tablet 500 mg     multivitamin w/minerals (THERA-VIT-M) tablet 1 tablet     naloxone (NARCAN) injection 0.1-0.4 mg     pantoprazole (PROTONIX) 2 mg/mL suspension 40 mg     polyethylene glycol (MIRALAX) Packet 17 g     potassium chloride (KLOR-CON) Packet 20-40 mEq     potassium chloride 10 mEq in 100 mL intermittent infusion with 10 mg lidocaine     potassium chloride 10 mEq in 100 mL sterile water intermittent infusion (premix)     potassium chloride 20 mEq in 50 mL intermittent infusion     potassium chloride ER (KLOR-CON M) CR tablet 20-40 mEq     potassium phosphate 10 mmol in D5W 250 mL intermittent infusion     potassium phosphate 15 mmol in D5W 250 mL intermittent infusion     potassium phosphate 20 mmol in D5W 250 mL intermittent infusion     potassium phosphate 20 mmol in D5W 500 mL intermittent infusion     potassium phosphate 25 mmol in D5W 500 mL intermittent infusion     QUEtiapine (SEROquel) tablet 25 mg     Reason beta blocker order not selected     senna-docusate (SENOKOT-S/PERICOLACE) 8.6-50 MG per tablet 1 tablet    Or     senna-docusate (SENOKOT-S/PERICOLACE) 8.6-50 MG per tablet 2 tablet     sodium chloride (PF) 0.9% PF flush 3 mL     sodium chloride (PF) 0.9% PF flush 3 mL     Warfarin Therapy Reminder (Check START DATE - warfarin may be starting in the FUTURE)

## 2020-02-29 NOTE — PROGRESS NOTES
"CVTS Daily Note  2/29/2020  Attending: Mac Jaramillo, *    S:   No overnight events.  Slept well last night.  Pt seen in chair resting comfortably.  No acute complaints.      Denies F/C/Sweats.  No CP, SOB, or calf pain.    Tolerating diet, no nausea.  + BM.  + Flatus.    Ambulates with assistance.    Pain level tolerable. Plan as per Neuro section below.     O:   Vital signs:  Temp: 98  F (36.7  C) Temp src: Axillary BP: (!) 141/93 Pulse: 91 Heart Rate: 88 Resp: 18 SpO2: 94 % O2 Device: BiPAP/CPAP Oxygen Delivery: 1 LPM Height: (170 cm entered into optia) Weight: 93.8 kg (206 lb 12.7 oz)  Estimated body mass index is 32.39 kg/m  as calculated from the following:    Height as of this encounter: 1.702 m (5' 7\").    Weight as of this encounter: 93.8 kg (206 lb 12.7 oz).    Weight; - 15 kg since admit and trending down.   24 hr Fluid status; net loss 2.1 L.     MAPs: 91 - 117   Gen: AAO x 3, pleasant, NAD  CV: VAD humming, no murmurs, rubs, or gallops  Pulm: CTA, no rhonchi or wheezes  Abd: soft, non-tender, no guarding  Incision: clean, dry, intact, no erythema  Chest Tube sites: dressings clean and dry, serosanguinous, no air leak, output 300 cc on left and 180 cc on right.   Lines: driveline dressing clean and dry   LVAD: speed 5500, flow 3.8 - 3.9, PI 3.6 - 4.5, power 4.2 - 4.4.       Labs:  BMP  Recent Labs   Lab 02/29/20  0606 02/28/20  0444 02/27/20  1540 02/27/20  0405    139 138 140   POTASSIUM 3.3* 3.4 3.8 3.6   CHLORIDE 99 98 97 100   CALOS 8.2* 8.6 8.1* 7.8*   CO2 36* 37* 37* 36*   BUN 36* 40* 42* 41*   CR 1.29* 1.48* 1.16 1.11   * 135* 291* 236*     CBC  Recent Labs   Lab 02/29/20  0606 02/28/20  0444 02/27/20  1540 02/27/20  0405   WBC 9.5 11.7* 11.0 11.2*   RBC 3.27* 3.64* 3.60* 3.33*   HGB 9.5* 10.4* 10.3* 9.8*   HCT 31.6* 35.0* 35.0* 32.8*   MCV 97 96 97 99   MCH 29.1 28.6 28.6 29.4   MCHC 30.1* 29.7* 29.4* 29.9*   RDW 18.4* 18.8* 18.7* 18.9*   * 486* 465* 415     INR  Recent " Labs   Lab 02/29/20  0606 02/28/20  0444 02/27/20  0405 02/26/20  0412   INR 2.52* 1.98* 1.73* 1.53*      Hepatic Panel   Lab Results   Component Value Date    AST 31 02/24/2020     Lab Results   Component Value Date    ALT 19 02/24/2020     Lab Results   Component Value Date    ALBUMIN 1.8 02/24/2020     GLUCOSE:   Recent Labs   Lab 02/29/20  0825 02/29/20  0606 02/29/20  0217 02/28/20  2132 02/28/20  1819 02/28/20  1238 02/28/20  0604 02/28/20  0444  02/27/20  1540  02/27/20  0405  02/26/20  1543  02/26/20  0412   GLC  --  131*  --   --   --   --   --  135*  --  291*  --  236*  --  214*  --  242*   *  --  128* 167* 235* 153* 134*  --    < >  --    < >  --    < >  --    < >  --     < > = values in this interval not displayed.       Imaging:  reviewed recent imaging, sm R apical pneumo, no effusions       A/P:   Eliseo Tanner is a 64 year old male with PMHx of DMT2, ABHINAV, CKD III, HTN, and CAD with chronic HF. He is now status post LVAD placement HeartMate III on 2/18 with Dr. Jaramillo and Dr Apodaca.       Neuro:   - Neuro intact  - Pain; tylenol and oxycodone  - Depression; fluoxetine     CV:   - Hx of chronic systolic heart failure with EF 15-20%  - Atrial Fibrillation, EP following, cardioversion 2/28. Amio 400 mg PO BID with taper written, digoxin 125 mcg.    - HMIII LVAD;     - ASA 81 mg, atorvastatin.     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 Bivalirudin gtt. Coumadin goal INR 2-3. Chromogenic Factor 10 68% (goal 20-40%).  - HTN; hydralazine 100 q 8 hrs, Cards to start lisinopril 2/29     Pulm:   - Hx ABHINAV on CPAP   - Pulm toilet, IS, activity and deep breathing   - Supplemental O2 PRN to keep sats > 92%. Wean off as tolerated.      ID:   - WBC WNL, afebrile, no signs or symptoms of infection   - Bacteremia 2/13 and 2/15; ampicillin 2 grams q 6 hrs (6 wks?)      GI / FEN:  - Reg diet, bowel regimen  - Hx GERD.      Renal / :   - CKD III; creatinine 1.29, adequate UOP.   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 On Bilvalirudin      Endo:   - Hx Gout; allopurinol 200 mg daily   - T2DM; sliding scale insulin and lantus 8 units.      Dispo:   - 6C since 2/27  - Anticipate DC to ARU per PT/OT recs, likely next week       Staff surgeons have been informed of changes through both  verbal and written communication.      Diego Zuñiga PA-C  Cardiothoracic Surgery  Pager [phone]    10:29 AM   February 29, 2020

## 2020-02-29 NOTE — PLAN OF CARE
Discharge Planner SLP   Patient plan for discharge: not stated  Current status: The patient is demonstrating good tolerance of a regular texture diet and thin liquids. He is independently using safe swallowing strategies with good success. SLP will sign off as continued tx is not indicated at this time.  Barriers to return to prior living situation: none from a swallowing standpoint  Recommendations for discharge: defer to OT and PT  Rationale for recommendations: no ongoing SLP needs, goals met       Entered by: Nida Hill 02/29/2020 3:15 PM          Speech Language Therapy Discharge Summary    Reason for therapy discharge:    All goals and outcomes met, no further needs identified.    Progress towards therapy goal(s). See goals on Care Plan in [facility] electronic health record for goal details.  Goals met    Therapy recommendation(s):    No further therapy is recommended.

## 2020-02-29 NOTE — PLAN OF CARE
"D/was sleeping on home bipap and later told me that he was up all night, did not sleep, and was \"too hot\". Now at 1815 he is awake and family is here and he agreed to order some supper even though he is not hungry. He continues on Angiomax drip and is getting coumadin. Continues on ATB and has chest tubes. He was CV today and is in sinus  A/progressing   P/monitor for changes  I/VAD drive line site is securely sutured and slightly red around entry site, skin is good around site. Small ring of bloody drainage on old dressing.     "

## 2020-03-01 ENCOUNTER — APPOINTMENT (OUTPATIENT)
Dept: PHYSICAL THERAPY | Facility: CLINIC | Age: 65
DRG: 001 | End: 2020-03-01
Attending: INTERNAL MEDICINE
Payer: MEDICARE

## 2020-03-01 LAB
ANION GAP SERPL CALCULATED.3IONS-SCNC: 3 MMOL/L (ref 3–14)
APTT PPP: 79 SEC (ref 22–37)
APTT PPP: 97 SEC (ref 22–37)
BUN SERPL-MCNC: 25 MG/DL (ref 7–30)
CALCIUM SERPL-MCNC: 8 MG/DL (ref 8.5–10.1)
CHLORIDE SERPL-SCNC: 101 MMOL/L (ref 94–109)
CO2 SERPL-SCNC: 32 MMOL/L (ref 20–32)
CREAT SERPL-MCNC: 1.06 MG/DL (ref 0.66–1.25)
ERYTHROCYTE [DISTWIDTH] IN BLOOD BY AUTOMATED COUNT: 18.1 % (ref 10–15)
FACT X ACT/NOR PPP CHRO: 40 % (ref 70–130)
GFR SERPL CREATININE-BSD FRML MDRD: 72 ML/MIN/{1.73_M2}
GLUCOSE BLDC GLUCOMTR-MCNC: 131 MG/DL (ref 70–99)
GLUCOSE BLDC GLUCOMTR-MCNC: 138 MG/DL (ref 70–99)
GLUCOSE BLDC GLUCOMTR-MCNC: 156 MG/DL (ref 70–99)
GLUCOSE BLDC GLUCOMTR-MCNC: 162 MG/DL (ref 70–99)
GLUCOSE BLDC GLUCOMTR-MCNC: 226 MG/DL (ref 70–99)
GLUCOSE SERPL-MCNC: 129 MG/DL (ref 70–99)
HCT VFR BLD AUTO: 31.6 % (ref 40–53)
HGB BLD-MCNC: 9.3 G/DL (ref 13.3–17.7)
INR PPP: 3.04 (ref 0.86–1.14)
LACTATE BLD-SCNC: 1.6 MMOL/L (ref 0.7–2)
MAGNESIUM SERPL-MCNC: 2.2 MG/DL (ref 1.6–2.3)
MCH RBC QN AUTO: 28 PG (ref 26.5–33)
MCHC RBC AUTO-ENTMCNC: 29.4 G/DL (ref 31.5–36.5)
MCV RBC AUTO: 95 FL (ref 78–100)
PLATELET # BLD AUTO: 446 10E9/L (ref 150–450)
POTASSIUM SERPL-SCNC: 3.6 MMOL/L (ref 3.4–5.3)
RBC # BLD AUTO: 3.32 10E12/L (ref 4.4–5.9)
SODIUM SERPL-SCNC: 136 MMOL/L (ref 133–144)
WBC # BLD AUTO: 8.6 10E9/L (ref 4–11)

## 2020-03-01 PROCEDURE — 83735 ASSAY OF MAGNESIUM: CPT | Performed by: PHYSICIAN ASSISTANT

## 2020-03-01 PROCEDURE — 25000132 ZZH RX MED GY IP 250 OP 250 PS 637: Mod: GY | Performed by: STUDENT IN AN ORGANIZED HEALTH CARE EDUCATION/TRAINING PROGRAM

## 2020-03-01 PROCEDURE — 93005 ELECTROCARDIOGRAM TRACING: CPT

## 2020-03-01 PROCEDURE — 85730 THROMBOPLASTIN TIME PARTIAL: CPT | Performed by: THORACIC SURGERY (CARDIOTHORACIC VASCULAR SURGERY)

## 2020-03-01 PROCEDURE — 97110 THERAPEUTIC EXERCISES: CPT | Mod: GP

## 2020-03-01 PROCEDURE — 36415 COLL VENOUS BLD VENIPUNCTURE: CPT | Performed by: PHYSICIAN ASSISTANT

## 2020-03-01 PROCEDURE — 25000132 ZZH RX MED GY IP 250 OP 250 PS 637: Mod: GY | Performed by: THORACIC SURGERY (CARDIOTHORACIC VASCULAR SURGERY)

## 2020-03-01 PROCEDURE — 83605 ASSAY OF LACTIC ACID: CPT | Performed by: THORACIC SURGERY (CARDIOTHORACIC VASCULAR SURGERY)

## 2020-03-01 PROCEDURE — 25000128 H RX IP 250 OP 636: Performed by: STUDENT IN AN ORGANIZED HEALTH CARE EDUCATION/TRAINING PROGRAM

## 2020-03-01 PROCEDURE — 85027 COMPLETE CBC AUTOMATED: CPT | Performed by: PHYSICIAN ASSISTANT

## 2020-03-01 PROCEDURE — 25000132 ZZH RX MED GY IP 250 OP 250 PS 637: Mod: GY

## 2020-03-01 PROCEDURE — 85610 PROTHROMBIN TIME: CPT | Performed by: PHYSICIAN ASSISTANT

## 2020-03-01 PROCEDURE — 80048 BASIC METABOLIC PNL TOTAL CA: CPT | Performed by: PHYSICIAN ASSISTANT

## 2020-03-01 PROCEDURE — 36415 COLL VENOUS BLD VENIPUNCTURE: CPT | Performed by: THORACIC SURGERY (CARDIOTHORACIC VASCULAR SURGERY)

## 2020-03-01 PROCEDURE — 40000275 ZZH STATISTIC RCP TIME EA 10 MIN

## 2020-03-01 PROCEDURE — 25000131 ZZH RX MED GY IP 250 OP 636 PS 637: Mod: GY | Performed by: THORACIC SURGERY (CARDIOTHORACIC VASCULAR SURGERY)

## 2020-03-01 PROCEDURE — 97116 GAIT TRAINING THERAPY: CPT | Mod: GP

## 2020-03-01 PROCEDURE — 00000146 ZZHCL STATISTIC GLUCOSE BY METER IP

## 2020-03-01 PROCEDURE — 85260 CLOT FACTOR X STUART-POWER: CPT | Performed by: PHYSICIAN ASSISTANT

## 2020-03-01 PROCEDURE — 93010 ELECTROCARDIOGRAM REPORT: CPT | Performed by: INTERNAL MEDICINE

## 2020-03-01 PROCEDURE — 97530 THERAPEUTIC ACTIVITIES: CPT | Mod: GP

## 2020-03-01 PROCEDURE — 85730 THROMBOPLASTIN TIME PARTIAL: CPT | Performed by: PHYSICIAN ASSISTANT

## 2020-03-01 PROCEDURE — 21400000 ZZH R&B CCU UMMC

## 2020-03-01 RX ORDER — FUROSEMIDE 10 MG/ML
40 INJECTION INTRAMUSCULAR; INTRAVENOUS ONCE
Status: COMPLETED | OUTPATIENT
Start: 2020-03-01 | End: 2020-03-01

## 2020-03-01 RX ADMIN — FUROSEMIDE 40 MG: 10 INJECTION, SOLUTION INTRAVENOUS at 15:48

## 2020-03-01 RX ADMIN — MULTIPLE VITAMINS W/ MINERALS TAB 1 TABLET: TAB at 08:20

## 2020-03-01 RX ADMIN — INSULIN ASPART 4 UNITS: 100 INJECTION, SOLUTION INTRAVENOUS; SUBCUTANEOUS at 18:33

## 2020-03-01 RX ADMIN — MAGNESIUM OXIDE 400 MG: 400 TABLET ORAL at 08:20

## 2020-03-01 RX ADMIN — HYDRALAZINE HYDROCHLORIDE 100 MG: 100 TABLET, FILM COATED ORAL at 08:19

## 2020-03-01 RX ADMIN — WARFARIN SODIUM 7 MG: 3 TABLET ORAL at 18:41

## 2020-03-01 RX ADMIN — ASPIRIN 81 MG CHEWABLE TABLET 81 MG: 81 TABLET CHEWABLE at 08:19

## 2020-03-01 RX ADMIN — INSULIN ASPART 1 UNITS: 100 INJECTION, SOLUTION INTRAVENOUS; SUBCUTANEOUS at 13:38

## 2020-03-01 RX ADMIN — Medication 200 MG: at 08:19

## 2020-03-01 RX ADMIN — METHOCARBAMOL 500 MG: 500 TABLET, FILM COATED ORAL at 18:41

## 2020-03-01 RX ADMIN — PANTOPRAZOLE SODIUM 40 MG: 40 TABLET, DELAYED RELEASE ORAL at 08:20

## 2020-03-01 RX ADMIN — FLUOXETINE 20 MG: 20 CAPSULE ORAL at 08:19

## 2020-03-01 RX ADMIN — ALLOPURINOL 200 MG: 100 TABLET ORAL at 08:20

## 2020-03-01 RX ADMIN — AMIODARONE HYDROCHLORIDE 400 MG: 200 TABLET ORAL at 08:20

## 2020-03-01 RX ADMIN — HYDRALAZINE HYDROCHLORIDE 100 MG: 100 TABLET, FILM COATED ORAL at 23:22

## 2020-03-01 RX ADMIN — POTASSIUM CHLORIDE 20 MEQ: 750 TABLET, EXTENDED RELEASE ORAL at 08:18

## 2020-03-01 RX ADMIN — AMPICILLIN SODIUM 2 G: 2 INJECTION, POWDER, FOR SOLUTION INTRAMUSCULAR; INTRAVENOUS at 14:10

## 2020-03-01 RX ADMIN — DIGOXIN 125 MCG: 125 TABLET ORAL at 08:19

## 2020-03-01 RX ADMIN — AMPICILLIN SODIUM 2 G: 2 INJECTION, POWDER, FOR SOLUTION INTRAMUSCULAR; INTRAVENOUS at 02:16

## 2020-03-01 RX ADMIN — LISINOPRIL 5 MG: 5 TABLET ORAL at 08:20

## 2020-03-01 RX ADMIN — AMPICILLIN SODIUM 2 G: 2 INJECTION, POWDER, FOR SOLUTION INTRAMUSCULAR; INTRAVENOUS at 19:52

## 2020-03-01 RX ADMIN — ATORVASTATIN CALCIUM 20 MG: 20 TABLET, FILM COATED ORAL at 19:58

## 2020-03-01 RX ADMIN — AMPICILLIN SODIUM 2 G: 2 INJECTION, POWDER, FOR SOLUTION INTRAMUSCULAR; INTRAVENOUS at 08:25

## 2020-03-01 RX ADMIN — HYDRALAZINE HYDROCHLORIDE 100 MG: 100 TABLET, FILM COATED ORAL at 15:51

## 2020-03-01 RX ADMIN — INSULIN ASPART 1 UNITS: 100 INJECTION, SOLUTION INTRAVENOUS; SUBCUTANEOUS at 21:20

## 2020-03-01 ASSESSMENT — ACTIVITIES OF DAILY LIVING (ADL)
ADLS_ACUITY_SCORE: 16

## 2020-03-01 ASSESSMENT — MIFFLIN-ST. JEOR: SCORE: 1687.1

## 2020-03-01 NOTE — PLAN OF CARE
PT - 6C  Discharge Planner PT   Patient plan for discharge: Rehab  Current status: Rolling and supine > sitting EOB with Min-ModA at upper body. Pt participates in transferring LVAD from wall > batteries, but requires assist as fine motor skills impaired 2/2 arthritis in hands. Jose for sit <> stands from bed and w/c and cues given to lean trunk forward. CGA and use of FWW for standing balance. Pt ambulates 300ft x2 using FWW and CGA-SBA. Requires seated rest break between bouts of amb 2/2 fatigue and SOB. Pt engaged in seated LE strengthening exercises. LVAD #s and VSS WNL.  Barriers to return to prior living situation: Current medical status, deconditioning, decreased activity tolerance, weakness, level of A needed for mobility  Recommendations for discharge: ARU  Rationale for recommendations: Patient is below baseline for mobility, will require continued skilled therapy to progress strength, activity tolerance and safety/IND with functional mobility. Patient is making good progress with therapy, has good family support and will tolerate 3 hours of intensive therapy/day.  Entered by: Faith Salgado 03/01/2020 2:43 PM

## 2020-03-01 NOTE — PLAN OF CARE
D: S/p LVAD  3 2/18. Hx. Chronic HF, HTN, CKD2, DM2, ABHINAV.  I/A: A&Ox4. VSS on RA/Cpap at HS. SR 70s-80s (w/RBBB). LVAD #s wnl, no alarms. Denies pain. Angiomax gtt held and titrated per orders this AM, see eMAR for current rate, PTT scheduled by oncoming RN. Abx infused per orders.  overnight. L pleural CT x2 to suxn, no air leaks, minimal serosang output, drsgs CDI. Adequate uop. 1-2 assist. Family at bedside at HS. Appeared to rest comfortably overnight. Weight up ~3 lbs this AM, plan to recheck when Pt out of bed next.   P: Continue to monitor and notify CVTS with questions/concerns.

## 2020-03-01 NOTE — PLAN OF CARE
OT 6C. Cancel. Pt declining therapy this PM due to waiting for lunch. Unable to check back later this PM due to scheduling. Will reschedule per POC.

## 2020-03-01 NOTE — PROGRESS NOTES
Called Cards 2 team about tachycardia with rates in 110s-120s sustaining with hypotensive blood pressures. Patient denies symptoms. Reports dyspnea on exertion. Denies dizziness/SOB at rest. States team will be rounding to see patient soon.

## 2020-03-01 NOTE — PLAN OF CARE
0700-1930  Patient is a 64 year old female admitted for advanced HF therapy, HMIII placed on  with a PMH of systolic HF, NICM, moderate CAD, HTN, ABHINAV on CPAP, DM2, CKDIII    Neuro: AOx4  Cardiac: LVAD numbers within parameters, no alarms. Dressing changed and systems check completed. Started lisinopril this AM for hypertension.  Telemetry: SR in 80s-90s  Respiratory: RA, denies SOB  GI/: voiding without difficulty, BM this AM  Endocrine: BG checks, insulin s/s and carb coverage given per orders. May need coverage for snacks?  Diet/Appetite: Regular diet, good appetite  Skin: no new skin concerns, midsternal incision. Chest tube sites/old sites, dressing changed.   LDA: 2 pleural chest tubes remain, on suction.   Activity: Up with 1 assist, steady on feet. Management of lines only difficulty. Up to recliner most of the day.  Pain: declines pain, reports comfortable with no pain medications.  Plan: Continue to monitor patient, contact cards 2 team for questions and concerns.

## 2020-03-01 NOTE — PROGRESS NOTES
Advanced Heart Failure Consult Progress Note  Eliseo Tanner MRN: [id number]  Age: 64 year old, : 1955  Date: 2020            Assessment and Plan:     Mr Eliseo Tanner is a 63yo M w/PMHx notable for chronic systolic heart failure, NICM, moderate CAD, HTN, ABHINAV on CPAP, DM2, CKDIII who is transferred to [facility] for evaluation and management of advanced heart failure with arrhythmia now s/p HM 3 on .    Today's plan (3/1):  -Lasix 40mg IV x1  -Continue lisinopril 5 mg PO every day  -Still need ICD prior to discharge, discuss with EP re:timing     Moderate CAD  Severe Mitral regurgitation  Chronic nonischemic systolic heart failure w/ reduced EF (15-20%) and exacerbation  New atrial fibrillation  NYHA Class III. Echo 2020: LVEF 15-20%; LVEDd 5.9 cm; 4+ MR. C 2019 w/ nonobstructive CAD w/ severe branch disease. Presented with increased wt gain, SOB, LE edema concerning for HF exacerbation, initially concerning for cardiogenic shock. Admitted to [facility] for further evaluation regarding need for advanced HF therapies. Patient is now s/p HM III placement on  with early post-op course complicated by bleeding that is slowing down as of  AM. Patient is persistently tachycardic to 130-140 with a wide-complex mostly-regular tachycardia. ECG obtained difficult to interpret given LVAD artifact, but afib w/ RVR and aberrancy vs. shelter vs. AT vs. Slow VT. S/p DCCV on  back into sinus rhythm.  -Continue ASA 81, atorvastatin 20 mg  -Continue coumadin with goal INR 2-3 / CFX goal 20-40 (while on Bival)  -Hydralazine 100mg PO Q8, lisinopril 5mg po daily   -LVAD speed at 5500 RPM  -Lasix 40mg IV x1, noted weight gain overnight   -Hydralazine 100 mg po TID      Proteus and Enterococcus bacteremia  Organisms growing from 2/2 blood cultures from . Blood cultured from  NGTD and 2/15 growing 1/2 S.epi, likely contaminant per ID.  ID consulted, appreciate help. Will decide  "on final duration of abx.  -daily blood cultures until negative  -Zosyn (2/13-2/14)  -Tobramycin (2/13-2/14)  -Vancomycin (2/13-2/17  -Meropenem (2/14-2/15)  -Ceftriaxone (2/16-2/28)  -Ampicillin (2/16-3/11) (14 days after swan removal on 2/26)    #Thrombocytopenia:  Concern for HIT given timing with respect to heparin exposure. HIT positive DAVINA negative. Antibody is now negative x2, thus no further indication for PLAX  -Heme consulted  -Bivalirudin gtt after LVAD surgery,goal chromogenic level 20-40     VFib requiring shock  Shocked x 3 prior to transfer, loaded with amio. No known prior history. Suspect related to underlying HF.   -Keep K>4, Mg>2  -Amio 400 mg PO QD  -Needs ICD for secondary prevention, will need to discuss with EP     Atrial flutter   S/p cardioversion 2/28/20   - Coumadin for anticoagulation, bival bridge       CODE: FULL CODE     Alina James  Cardiology PGY4                Subjective/Interval Events     No acute overnight events. This AM, patient denying pain, SOB, CP. Reports that he is sleeping better than in prior nights.          Objective     BP (!) 85/45 (BP Location: Right arm)   Pulse 91   Temp 97.6  F (36.4  C) (Oral)   Resp 16   Ht 1.702 m (5' 7\")   Wt 94.8 kg (209 lb 1.6 oz)   SpO2 96%   BMI 32.75 kg/m    Temp:  [96.8  F (36  C)-99.3  F (37.4  C)] 97.6  F (36.4  C)  Pulse:  [91] 91  Heart Rate:  [] 121  Resp:  [14-18] 16  BP: ()/(45-90) 85/45  SpO2:  [95 %-98 %] 96 %  Wt Readings from Last 2 Encounters:   03/01/20 94.8 kg (209 lb 1.6 oz)     I/O last 3 completed shifts:  In: 1234.96 [P.O.:970; I.V.:264.96]  Out: 1670 [Urine:1500; Chest Tube:170]      Gen: No acute distress  HEENT: NC/AT, +JVD   PULM/THORAX: CTAB  CV: normal rate, regular rhythm, normal S1 and S2, LVAD hum  ABD: Soft, NTND, bowel sounds present, no masses  EXT: WWP. Trace LE edema, clubbing or cyanosis.  NEURO: A&Ox3                Data:     Recent Results (from the past 24 hour(s))   Glucose by " meter    Collection Time: 02/29/20  6:11 PM   Result Value Ref Range    Glucose 202 (H) 70 - 99 mg/dL   Glucose by meter    Collection Time: 02/29/20  9:32 PM   Result Value Ref Range    Glucose 170 (H) 70 - 99 mg/dL   Glucose by meter    Collection Time: 03/01/20  4:17 AM   Result Value Ref Range    Glucose 138 (H) 70 - 99 mg/dL   Basic metabolic panel    Collection Time: 03/01/20  6:31 AM   Result Value Ref Range    Sodium 136 133 - 144 mmol/L    Potassium 3.6 3.4 - 5.3 mmol/L    Chloride 101 94 - 109 mmol/L    Carbon Dioxide 32 20 - 32 mmol/L    Anion Gap 3 3 - 14 mmol/L    Glucose 129 (H) 70 - 99 mg/dL    Urea Nitrogen 25 7 - 30 mg/dL    Creatinine 1.06 0.66 - 1.25 mg/dL    GFR Estimate 72 >60 mL/min/[1.73_m2]    GFR Estimate If Black 84 >60 mL/min/[1.73_m2]    Calcium 8.0 (L) 8.5 - 10.1 mg/dL   CBC with platelets    Collection Time: 03/01/20  6:31 AM   Result Value Ref Range    WBC 8.6 4.0 - 11.0 10e9/L    RBC Count 3.32 (L) 4.4 - 5.9 10e12/L    Hemoglobin 9.3 (L) 13.3 - 17.7 g/dL    Hematocrit 31.6 (L) 40.0 - 53.0 %    MCV 95 78 - 100 fl    MCH 28.0 26.5 - 33.0 pg    MCHC 29.4 (L) 31.5 - 36.5 g/dL    RDW 18.1 (H) 10.0 - 15.0 %    Platelet Count 446 150 - 450 10e9/L   Magnesium    Collection Time: 03/01/20  6:31 AM   Result Value Ref Range    Magnesium 2.2 1.6 - 2.3 mg/dL   INR    Collection Time: 03/01/20  6:31 AM   Result Value Ref Range    INR 3.04 (H) 0.86 - 1.14   Factor 10 chromogenic    Collection Time: 03/01/20  6:31 AM   Result Value Ref Range    Chromogenic Factor 10 40 (L) 70 - 130 %   Partial thromboplastin time    Collection Time: 03/01/20  6:31 AM   Result Value Ref Range    PTT 97 (H) 22 - 37 sec   Glucose by meter    Collection Time: 03/01/20  7:12 AM   Result Value Ref Range    Glucose 131 (H) 70 - 99 mg/dL   Partial thromboplastin time    Collection Time: 03/01/20  9:39 AM   Result Value Ref Range    PTT 79 (H) 22 - 37 sec   Lactic acid level STAT    Collection Time: 03/01/20 11:36 AM   Result  Value Ref Range    Lactate for Sepsis Protocol 1.6 0.7 - 2.0 mmol/L   Glucose by meter    Collection Time: 20 12:30 PM   Result Value Ref Range    Glucose 156 (H) 70 - 99 mg/dL   EKG 12-lead, tracing only    Collection Time: 20 12:49 PM   Result Value Ref Range    Interpretation ECG Click View Image link to view waveform and result        Recent Results (from the past 24 hour(s))   Echocardiogram Complete    Narrative    [id number]  [id number]  [id number]  [id number]^NAHUN^JIM           [facility]  Echocardiography Laboratory  [address] [place] [id number]     Name: WOLFGANG LEACH  MRN: [id number]  : 1955  Study Date: 2020 10:06 AM  Age: 64 yrs  Gender: Male  Patient Location: [facility]  Reason For Study: Heart Failure  Ordering Physician: NAHUN GUTIERREZ  Referring Physician: SELF, REFERRED  Performed By: Yvonne Adan RDCS     BSA: 2.2 m2  Height: 67 in  Weight: 244 lb  HR: 103  BP: 72/52 mmHg  _____________________________________________________________________________  __        Procedure  Complete Portable Echo Adult. Contrast Optison. Optison (NDC #0802-3505-90)  given intravenously. Patient was given 6 ml mixture of 3 ml Optison and 6 ml  saline. 3 ml wasted.  _____________________________________________________________________________  __        Interpretation Summary  Left ventricular size is normal.  LVEF 20% based on biplane 2D tracing.  Mild to moderate right ventricular dilation is present.  Global right ventricular function is moderately reduced.  Pulmonary artery systolic pressure is normal.  Dilation of the inferior vena cava is present with abnormal respiratory  variation in diameter.  _____________________________________________________________________________  __        Left Ventricle  Left ventricular size is normal. LVEF 20% based on biplane 2D tracing. Left  ventricular wall thickness is normal. Diastolic function not  assessed due to  frequent ectopy. Abnormal septal motion consistent with left bundle branch  block is present.     Right Ventricle  Mild to moderate right ventricular dilation is present. Global right  ventricular function is moderately reduced.     Atria  Mild biatrial enlargement is present.     Mitral Valve  Moderate mitral insufficiency is present.        Aortic Valve  Aortic valve is normal in structure and function.     Tricuspid Valve  Moderate tricuspid insufficiency is present. The right ventricular systolic  pressure is approximated at 24.4 mmHg plus the right atrial pressure.  Pulmonary artery systolic pressure is normal.     Pulmonic Valve  The pulmonic valve cannot be assessed. Mild pulmonic insufficiency is present.     Vessels  The aorta root is normal. The pulmonary artery cannot be assessed. Dilation of  the inferior vena cava is present with abnormal respiratory variation in  diameter.     Compared to Previous Study  Previous study not available for comparison.     _____________________________________________________________________________  __  MMode/2D Measurements & Calculations  IVSd: 0.61 cm  LVIDd: 4.7 cm  LVIDs: 3.7 cm  LVPWd: 0.75 cm  FS: 21.7 %  LV mass(C)d: 99.1 grams  LV mass(C)dI: 45.0 grams/m2     Ao root diam: 2.9 cm  asc Aorta Diam: 2.5 cm  LVOT diam: 1.9 cm  LVOT area: 2.8 cm2     EF(MOD-bp): 21.5 %  LA Volume (BP): 84.8 ml  LA Volume Index (BP): 38.5 ml/m2  RWT: 0.32        Doppler Measurements & Calculations  PA acc time: 0.09 sec     PI end-d saumya: 154.0 cm/sec  TR max saumya: 247.0 cm/sec  TR max P.4 mmHg     _____________________________________________________________________________  __           Report approved by: Graciela Tomlinson 2020 12:05 PM      XR Chest Port 1 View    Narrative    XR CHEST PORT 1 VW  2020 4:21 PM      HISTORY: SG Placement    COMPARISON: None available    FINDINGS: Single AP chest radiograph. Spencer-Chucky catheter tip is in the  proximal right  main pulmonary artery. Right central line tip and right  upper extremity PICC line tip at the lower SVC. Trachea is midline,  cardiomegaly. Bilateral perihilar streaky opacities. Mild increased  interstitial opacities in the right lung with ill-defined pulmonary  vascularity. No pneumothorax or pleural effusion. No acute osseous or  abdominal abnormality. Elevated left hemidiaphragm.      Impression    IMPRESSION:  1. Longview-Chucky catheter tip at the proximal right main pulmonary artery.  2. Cardiomegaly with mild pulmonary edema, especially on the right.    I have personally reviewed the examination and initial interpretation  and I agree with the findings.    DONG SOLORIO MD   Cardiac Catheterization    Narrative      Successful insertion of a IABP in the RFA                Medications     Current Facility-Administered Medications   Medication     acetaminophen (TYLENOL) tablet 650 mg     albuterol (PROAIR HFA/PROVENTIL HFA/VENTOLIN HFA) 108 (90 Base) MCG/ACT inhaler 2 puff     allopurinol (ZYLOPRIM) tablet 200 mg     amiodarone (PACERONE) tablet 400 mg    Followed by     [START ON 3/8/2020] amiodarone (PACERONE) tablet 200 mg     ampicillin (OMNIPEN) 2 g vial to attach to  ml bag     aspirin (ASA) chewable tablet 81 mg     atorvastatin (LIPITOR) tablet 20 mg     calcium carbonate (TUMS) chewable tablet 500 mg     co-enzyme Q-10 capsule 200 mg     dextrose 10% infusion     glucose gel 15-30 g    Or     dextrose 50 % injection 25-50 mL    Or     glucagon injection 1 mg     diclofenac (VOLTAREN) 1 % topical gel 4 g     digoxin (LANOXIN) tablet 125 mcg     FLUoxetine (PROzac) capsule 20 mg     hydrALAZINE (APRESOLINE) tablet 100 mg     HYDROmorphone (DILAUDID) tablet 2 mg     insulin aspart (NovoLOG) injection (RAPID ACTING)     insulin aspart (NovoLOG) injection (RAPID ACTING)     [START ON 3/2/2020] insulin glargine (LANTUS PEN) injection 10 Units     lidocaine (LMX4) cream     lidocaine 1 % 0.1-1 mL      lisinopril (ZESTRIL) tablet 5 mg     magnesium oxide (MAG-OX) tablet 400 mg     magnesium sulfate 2 g in water intermittent infusion     magnesium sulfate 4 g in 100 mL sterile water (premade)     medication instruction     melatonin tablet 3 mg     methocarbamol (ROBAXIN) tablet 500 mg     multivitamin w/minerals (THERA-VIT-M) tablet 1 tablet     naloxone (NARCAN) injection 0.1-0.4 mg     pantoprazole (PROTONIX) EC tablet 40 mg     polyethylene glycol (MIRALAX) Packet 17 g     potassium chloride (KLOR-CON) Packet 20-40 mEq     potassium chloride 10 mEq in 100 mL intermittent infusion with 10 mg lidocaine     potassium chloride 10 mEq in 100 mL sterile water intermittent infusion (premix)     potassium chloride 20 mEq in 50 mL intermittent infusion     potassium chloride ER (KLOR-CON M) CR tablet 20-40 mEq     potassium phosphate 10 mmol in D5W 250 mL intermittent infusion     potassium phosphate 15 mmol in D5W 250 mL intermittent infusion     potassium phosphate 20 mmol in D5W 250 mL intermittent infusion     potassium phosphate 20 mmol in D5W 500 mL intermittent infusion     potassium phosphate 25 mmol in D5W 500 mL intermittent infusion     QUEtiapine (SEROquel) tablet 25 mg     Reason beta blocker order not selected     senna-docusate (SENOKOT-S/PERICOLACE) 8.6-50 MG per tablet 1 tablet    Or     senna-docusate (SENOKOT-S/PERICOLACE) 8.6-50 MG per tablet 2 tablet     sodium chloride (PF) 0.9% PF flush 3 mL     sodium chloride (PF) 0.9% PF flush 3 mL     warfarin ANTICOAGULANT (COUMADIN) tablet 7 mg     Warfarin Therapy Reminder (Check START DATE - warfarin may be starting in the FUTURE)

## 2020-03-01 NOTE — PROGRESS NOTES
"CVTS Daily Note  3/1/2020  Attending: Mac Jarmaillo, *    S:   No overnight events. HR up to 120, feels palpitations and a little lightheadedness with activity.   Pt seen at bedside resting comfortably.    No acute complaints.      Denies F/C/Sweats.  No CP, SOB, or calf pain.    Tolerating diet and tube feeds.  + BM.  + Flatus.    Ambulated well with heavy assistance.    Pain level tolerable. Plan as per Neuro section below.     O:   Vital signs:  Temp: 98.4  F (36.9  C) Temp src: Oral BP: 91/80 Pulse: 91 Heart Rate: 83 Resp: 16 SpO2: 95 % O2 Device: None (Room air) Oxygen Delivery: 1 LPM Height: (170 cm entered into optia) Weight: 94.8 kg (209 lb 1.6 oz)  Estimated body mass index is 32.75 kg/m  as calculated from the following:    Height as of this encounter: 1.702 m (5' 7\").    Weight as of this encounter: 94.8 kg (209 lb 1.6 oz).    MAPs: 83 - 100   Gen: AAO x 3, pleasant, NAD  CV: VAD humming, no murmurs, rubs, or gallops.   Pulm: CTA, no rhonchi or wheezes  Abd: soft, non-tender, no guarding  Ext: trace peripheral edema, non-pitting  Incision: clean, dry, intact, no erythema  Chest Tube sites: dressings clean and dry, serosanguinous, no air leak, output >200 cc each tube  Lines: driveline dressing clean and dry   LVAD: speed 5500, flow 4.2 - 4.3, PI 3.5 - 3.6, power 4.3 - 4.4.       Labs:  BMP  Recent Labs   Lab 03/01/20  0631 02/29/20  1350 02/29/20  0606 02/28/20  0444 02/27/20  1540     --  139 139 138   POTASSIUM 3.6 3.7 3.3* 3.4 3.8   CHLORIDE 101  --  99 98 97   CALOS 8.0*  --  8.2* 8.6 8.1*   CO2 32  --  36* 37* 37*   BUN 25  --  36* 40* 42*   CR 1.06  --  1.29* 1.48* 1.16   *  --  131* 135* 291*     CBC  Recent Labs   Lab 03/01/20  0631 02/29/20  0606 02/28/20  0444 02/27/20  1540   WBC 8.6 9.5 11.7* 11.0   RBC 3.32* 3.27* 3.64* 3.60*   HGB 9.3* 9.5* 10.4* 10.3*   HCT 31.6* 31.6* 35.0* 35.0*   MCV 95 97 96 97   MCH 28.0 29.1 28.6 28.6   MCHC 29.4* 30.1* 29.7* 29.4*   RDW 18.1* " 18.4* 18.8* 18.7*    457* 486* 465*     INR  Recent Labs   Lab 03/01/20  0631 02/29/20  0606 02/28/20  0444 02/27/20  0405   INR 3.04* 2.52* 1.98* 1.73*      Hepatic Panel   Lab Results   Component Value Date    AST 31 02/24/2020     Lab Results   Component Value Date    ALT 19 02/24/2020     Lab Results   Component Value Date    ALBUMIN 1.8 02/24/2020     GLUCOSE:   Recent Labs   Lab 03/01/20  0712 03/01/20  0631 03/01/20  0417 02/29/20  2132 02/29/20  1811 02/29/20  1245 02/29/20  0825 02/29/20  0606  02/28/20  0444  02/27/20  1540  02/27/20  0405  02/26/20  1543   GLC  --  129*  --   --   --   --   --  131*  --  135*  --  291*  --  236*  --  214*   *  --  138* 170* 202* 193* 135*  --    < >  --    < >  --    < >  --    < >  --     < > = values in this interval not displayed.       Imaging:  reviewed recent imaging     A/P:   Eliseo Tanner is a 64 year old male with PMHx of DMT2, ABHINAV, CKD III, HTN, and CAD with chronic HF. He is now status post LVAD placement HeartMate III on 2/18 with Dr. Jaramillo and Dr Apodaca.       Neuro:   - Neuro intact  - Pain; tylenol and oxycodone  - Depression; fluoxetine     CV:   - Hx of chronic systolic heart failure with EF 15-20%  - Atrial Fibrillation, EP following, cardioversion 2/28. Amio 400 mg PO BID with taper written, digoxin 125 mcg.    - HMIII LVAD;     - ASA 81 mg, atorvastatin.     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 Bivalirudin gtt. Coumadin goal INR 2-3. Chromogenic Factor 10 pending% (goal 20-40%).  - HTN; hydralazine 100 q 8 hrs, Cards dosing lisinopril with better BP control.      Pulm:   - Hx ABHINAV on CPAP   - Pulm toilet, IS, activity and deep breathing   - Supplemental O2 PRN to keep sats > 92%. Wean off as tolerated.      ID:   - WBC WNL, afebrile, no signs or symptoms of infection   - Bacteremia 2/13 and 2/15; ampicillin 2 grams q 6 hrs (6 wks?)      GI / FEN:  - Reg diet, bowel regimen  - Hx GERD.      Renal / :   - CKD III; creatinine 1.29, adequate UOP.   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 On Bilvalirudin      Endo:   - Hx Gout; allopurinol 200 mg daily   - T2DM; sliding scale insulin and lantus 10 units.      Dispo:   - 6C since 2/27  - Anticipate DC to ARU per PT/OT recs, likely this week       Staff surgeons have been informed of changes through both  verbal and written communication.      Diego Zuñiga PA-C  Cardiothoracic Surgery  Pager [phone]    9:43 AM   March 1, 2020

## 2020-03-01 NOTE — PLAN OF CARE
0700-1930  Patient is a 64 year old female admitted for advanced HF therapy, HMIII placed on  with a PMH of systolic HF, NICM, moderate CAD, HTN, ABHINAV on CPAP, DM2, CKDIII.    Neuro: AOx4  Cardiac: denies chest pain, SOB at rest. States has MIRANDA with up in halls.  LVAD numbers within parameters, no alarms during this shift.  Telemetry: switched to accelerated junctional rhythm during this shift.   Respiratory: RA  GI/: voiding adequately, one time dose of lasix IV given this afternoon  Endocrine: BG checks, carb coverage and sliding scale   Diet/Appetite: good appetite, 2gNa diet   Skin: no new skin concerns  LDA: 2 pleural chest tubes to suction, PIVx2 SL with intermittent antibiotics  Activity: up with 1 assist, walked in hallway with therapy this shift  Pain: denies  Plan: Continue to monitor, report changes or concerns for CVTS care team.

## 2020-03-02 ENCOUNTER — APPOINTMENT (OUTPATIENT)
Dept: OCCUPATIONAL THERAPY | Facility: CLINIC | Age: 65
DRG: 001 | End: 2020-03-02
Attending: INTERNAL MEDICINE
Payer: MEDICARE

## 2020-03-02 LAB
ANION GAP SERPL CALCULATED.3IONS-SCNC: 5 MMOL/L (ref 3–14)
APTT PPP: 58 SEC (ref 22–37)
BUN SERPL-MCNC: 24 MG/DL (ref 7–30)
CALCIUM SERPL-MCNC: 8.2 MG/DL (ref 8.5–10.1)
CHLORIDE SERPL-SCNC: 102 MMOL/L (ref 94–109)
CO2 SERPL-SCNC: 29 MMOL/L (ref 20–32)
CREAT SERPL-MCNC: 1.11 MG/DL (ref 0.66–1.25)
DIGOXIN SERPL-MCNC: 0.8 UG/L (ref 0.5–2)
ERYTHROCYTE [DISTWIDTH] IN BLOOD BY AUTOMATED COUNT: 18 % (ref 10–15)
FACT X ACT/NOR PPP CHRO: 32 % (ref 70–130)
GFR SERPL CREATININE-BSD FRML MDRD: 69 ML/MIN/{1.73_M2}
GLUCOSE BLDC GLUCOMTR-MCNC: 133 MG/DL (ref 70–99)
GLUCOSE BLDC GLUCOMTR-MCNC: 144 MG/DL (ref 70–99)
GLUCOSE BLDC GLUCOMTR-MCNC: 189 MG/DL (ref 70–99)
GLUCOSE BLDC GLUCOMTR-MCNC: 204 MG/DL (ref 70–99)
GLUCOSE SERPL-MCNC: 130 MG/DL (ref 70–99)
HCT VFR BLD AUTO: 32 % (ref 40–53)
HGB BLD-MCNC: 9.5 G/DL (ref 13.3–17.7)
INR PPP: 2.66 (ref 0.86–1.14)
INTERPRETATION ECG - MUSE: NORMAL
INTERPRETATION ECG - MUSE: NORMAL
MAGNESIUM SERPL-MCNC: 2 MG/DL (ref 1.6–2.3)
MCH RBC QN AUTO: 28.3 PG (ref 26.5–33)
MCHC RBC AUTO-ENTMCNC: 29.7 G/DL (ref 31.5–36.5)
MCV RBC AUTO: 95 FL (ref 78–100)
PLATELET # BLD AUTO: 457 10E9/L (ref 150–450)
POTASSIUM SERPL-SCNC: 3.8 MMOL/L (ref 3.4–5.3)
RBC # BLD AUTO: 3.36 10E12/L (ref 4.4–5.9)
SODIUM SERPL-SCNC: 136 MMOL/L (ref 133–144)
WBC # BLD AUTO: 8.6 10E9/L (ref 4–11)

## 2020-03-02 PROCEDURE — 85260 CLOT FACTOR X STUART-POWER: CPT | Performed by: PHYSICIAN ASSISTANT

## 2020-03-02 PROCEDURE — 85027 COMPLETE CBC AUTOMATED: CPT | Performed by: PHYSICIAN ASSISTANT

## 2020-03-02 PROCEDURE — 36415 COLL VENOUS BLD VENIPUNCTURE: CPT | Performed by: PHYSICIAN ASSISTANT

## 2020-03-02 PROCEDURE — 85610 PROTHROMBIN TIME: CPT | Performed by: PHYSICIAN ASSISTANT

## 2020-03-02 PROCEDURE — 21400000 ZZH R&B CCU UMMC

## 2020-03-02 PROCEDURE — 99232 SBSQ HOSP IP/OBS MODERATE 35: CPT | Mod: GC | Performed by: INTERNAL MEDICINE

## 2020-03-02 PROCEDURE — 25000132 ZZH RX MED GY IP 250 OP 250 PS 637: Mod: GY | Performed by: STUDENT IN AN ORGANIZED HEALTH CARE EDUCATION/TRAINING PROGRAM

## 2020-03-02 PROCEDURE — 25000131 ZZH RX MED GY IP 250 OP 636 PS 637: Mod: GY | Performed by: PHYSICIAN ASSISTANT

## 2020-03-02 PROCEDURE — 85730 THROMBOPLASTIN TIME PARTIAL: CPT | Performed by: PHYSICIAN ASSISTANT

## 2020-03-02 PROCEDURE — 25000132 ZZH RX MED GY IP 250 OP 250 PS 637: Mod: GY | Performed by: THORACIC SURGERY (CARDIOTHORACIC VASCULAR SURGERY)

## 2020-03-02 PROCEDURE — 80162 ASSAY OF DIGOXIN TOTAL: CPT | Performed by: PHYSICIAN ASSISTANT

## 2020-03-02 PROCEDURE — 97535 SELF CARE MNGMENT TRAINING: CPT | Mod: GO | Performed by: OCCUPATIONAL THERAPIST

## 2020-03-02 PROCEDURE — 00000146 ZZHCL STATISTIC GLUCOSE BY METER IP

## 2020-03-02 PROCEDURE — 80048 BASIC METABOLIC PNL TOTAL CA: CPT | Performed by: PHYSICIAN ASSISTANT

## 2020-03-02 PROCEDURE — 83735 ASSAY OF MAGNESIUM: CPT | Performed by: PHYSICIAN ASSISTANT

## 2020-03-02 PROCEDURE — 97110 THERAPEUTIC EXERCISES: CPT | Mod: GO | Performed by: OCCUPATIONAL THERAPIST

## 2020-03-02 PROCEDURE — 25000128 H RX IP 250 OP 636: Performed by: STUDENT IN AN ORGANIZED HEALTH CARE EDUCATION/TRAINING PROGRAM

## 2020-03-02 RX ADMIN — DICLOFENAC 4 G: 10 GEL TOPICAL at 08:48

## 2020-03-02 RX ADMIN — INSULIN ASPART 3 UNITS: 100 INJECTION, SOLUTION INTRAVENOUS; SUBCUTANEOUS at 11:43

## 2020-03-02 RX ADMIN — INSULIN GLARGINE 10 UNITS: 100 INJECTION, SOLUTION SUBCUTANEOUS at 09:03

## 2020-03-02 RX ADMIN — INSULIN ASPART 2 UNITS: 100 INJECTION, SOLUTION INTRAVENOUS; SUBCUTANEOUS at 17:21

## 2020-03-02 RX ADMIN — WARFARIN SODIUM 7 MG: 3 TABLET ORAL at 17:21

## 2020-03-02 RX ADMIN — MAGNESIUM OXIDE 400 MG: 400 TABLET ORAL at 08:45

## 2020-03-02 RX ADMIN — Medication 200 MG: at 08:44

## 2020-03-02 RX ADMIN — INSULIN ASPART 1 UNITS: 100 INJECTION, SOLUTION INTRAVENOUS; SUBCUTANEOUS at 21:49

## 2020-03-02 RX ADMIN — MULTIPLE VITAMINS W/ MINERALS TAB 1 TABLET: TAB at 08:45

## 2020-03-02 RX ADMIN — AMPICILLIN SODIUM 2 G: 2 INJECTION, POWDER, FOR SOLUTION INTRAMUSCULAR; INTRAVENOUS at 19:57

## 2020-03-02 RX ADMIN — AMPICILLIN SODIUM 2 G: 2 INJECTION, POWDER, FOR SOLUTION INTRAMUSCULAR; INTRAVENOUS at 08:48

## 2020-03-02 RX ADMIN — AMIODARONE HYDROCHLORIDE 400 MG: 200 TABLET ORAL at 08:45

## 2020-03-02 RX ADMIN — HYDRALAZINE HYDROCHLORIDE 100 MG: 100 TABLET, FILM COATED ORAL at 16:16

## 2020-03-02 RX ADMIN — METHOCARBAMOL 500 MG: 500 TABLET, FILM COATED ORAL at 19:57

## 2020-03-02 RX ADMIN — DIGOXIN 125 MCG: 125 TABLET ORAL at 08:45

## 2020-03-02 RX ADMIN — PANTOPRAZOLE SODIUM 40 MG: 40 TABLET, DELAYED RELEASE ORAL at 08:46

## 2020-03-02 RX ADMIN — ALLOPURINOL 200 MG: 100 TABLET ORAL at 08:44

## 2020-03-02 RX ADMIN — AMPICILLIN SODIUM 2 G: 2 INJECTION, POWDER, FOR SOLUTION INTRAMUSCULAR; INTRAVENOUS at 02:15

## 2020-03-02 RX ADMIN — AMPICILLIN SODIUM 2 G: 2 INJECTION, POWDER, FOR SOLUTION INTRAMUSCULAR; INTRAVENOUS at 16:00

## 2020-03-02 RX ADMIN — FLUOXETINE 20 MG: 20 CAPSULE ORAL at 08:45

## 2020-03-02 RX ADMIN — ATORVASTATIN CALCIUM 20 MG: 20 TABLET, FILM COATED ORAL at 19:57

## 2020-03-02 RX ADMIN — HYDRALAZINE HYDROCHLORIDE 100 MG: 100 TABLET, FILM COATED ORAL at 08:45

## 2020-03-02 RX ADMIN — ASPIRIN 81 MG CHEWABLE TABLET 81 MG: 81 TABLET CHEWABLE at 08:45

## 2020-03-02 ASSESSMENT — ACTIVITIES OF DAILY LIVING (ADL)
ADLS_ACUITY_SCORE: 16

## 2020-03-02 ASSESSMENT — PAIN DESCRIPTION - DESCRIPTORS: DESCRIPTORS: ACHING;SORE

## 2020-03-02 NOTE — PLAN OF CARE
6C PT - Cancel. Pt with OT in AM. PT attempted to see pt 2x in PM. Pt with providers. Will reschedule.

## 2020-03-02 NOTE — PROGRESS NOTES
Advanced Heart Failure Consult Progress Note  Eliseo Tanner MRN: [id number]  Age: 64 year old, : 1955  Date: 2020      Faculty Attestation  Gucci Tomas M.D.    I personally saw and examined this patient, reviewed recent laboratories and imaging studies, discussed the case with the housestaff, and conveyed plan to the patient.  I answered all questions from patient and/or family. I agree with the examination, assessment and plan outlined here.  CT drainage remains substantial impediment to discharge.            Assessment and Plan:     Mr Eliseo Tanner is a 63yo M w/PMHx notable for chronic systolic heart failure, NICM, moderate CAD, HTN, ABHINAV on CPAP, DM2, CKDIII who is transferred to [facility] for evaluation and management of advanced heart failure with arrhythmia now s/p HM 3 on .    Today's plan (3/2):  -Lasix 40mg IV x1  -Continue lisinopril 5 mg PO every day  -Still need ICD prior to discharge, discuss with EP re:timing     Moderate CAD  Severe Mitral regurgitation  Chronic nonischemic systolic heart failure w/ reduced EF (15-20%) and exacerbation  New atrial fibrillation  NYHA Class III. Echo 2020: LVEF 15-20%; LVEDd 5.9 cm; 4+ MR. C 2019 w/ nonobstructive CAD w/ severe branch disease. Presented with increased wt gain, SOB, LE edema concerning for HF exacerbation, initially concerning for cardiogenic shock. Admitted to [facility] for further evaluation regarding need for advanced HF therapies. Patient is now s/p HM III placement on  with early post-op course complicated by bleeding that is slowing down as of  AM. Patient is persistently tachycardic to 130-140 with a wide-complex mostly-regular tachycardia. ECG obtained difficult to interpret given LVAD artifact, but afib w/ RVR and aberrancy vs. USP vs. AT vs. Slow VT. S/p DCCV on  back into sinus rhythm.  -Continue ASA 81, atorvastatin 20 mg  -Continue coumadin with goal INR 2-3 / CFX goal  "20-40  -Hydralazine 100mg PO Q8, lisinopril 5mg po daily   -LVAD speed at 5500 RPM  -Lasix 40mg IV x1, noted weight gain overnight     Proteus and Enterococcus bacteremia  Organisms growing from 2/2 blood cultures from 2/13. Blood cultured from 2/16 NGTD and 2/15 growing 1/2 S.epi, likely contaminant per ID.  ID consulted, appreciate help. Will decide on final duration of abx.  -daily blood cultures until negative  -Zosyn (2/13-2/14)  -Tobramycin (2/13-2/14)  -Vancomycin (2/13-2/17  -Meropenem (2/14-2/15)  -Ceftriaxone (2/16-2/28)  -Ampicillin (2/16-3/11) (14 days after swan removal on 2/26)    #Thrombocytopenia:  Concern for HIT given timing with respect to heparin exposure. HIT positive DAVINA negative. Antibody is now negative x2, thus no further indication for PLAX  -Heme consulted prior to LVAD surgery    VFib requiring shock  Shocked x 3 prior to transfer, loaded with amio. No known prior history. Suspect related to underlying HF.   -Keep K>4, Mg>2  -Amio 400 mg PO QD  -Needs ICD for secondary prevention, will need to discuss with EP     Atrial flutter   S/p cardioversion 2/28/20   - Coumadin for anticoagulation       CODE: FULL CODE     Stanford Acuna M.D.  Cardiology Fellow                Subjective/Interval Events     No acute overnight events. This AM, patient denying pain, SOB, CP. Reports that he is sleeping better than in prior nights.          Objective     /49 (BP Location: Left arm)   Pulse 91   Temp 97.9  F (36.6  C) (Oral)   Resp 16   Ht 1.702 m (5' 7\")   Wt 94.8 kg (209 lb 1.6 oz)   SpO2 94%   BMI 32.75 kg/m    Temp:  [97.6  F (36.4  C)-99.2  F (37.3  C)] 97.9  F (36.6  C)  Heart Rate:  [110-121] 111  Resp:  [14-16] 16  BP: ()/(45-88) 100/49  SpO2:  [94 %-98 %] 94 %  Wt Readings from Last 2 Encounters:   03/01/20 94.8 kg (209 lb 1.6 oz)     I/O last 3 completed shifts:  In: 1820 [P.O.:1620; I.V.:200]  Out: 2445 [Urine:1945; Chest Tube:500]      Gen: No acute distress  HEENT: NC/AT, " +JVD   PULM/THORAX: CTAB  CV: normal rate, regular rhythm, normal S1 and S2, LVAD hum  ABD: Soft, NTND, bowel sounds present, no masses  EXT: WWP. Trace LE edema, clubbing or cyanosis.  NEURO: A&Ox3                Data:     Recent Results (from the past 24 hour(s))   Partial thromboplastin time    Collection Time: 03/01/20  9:39 AM   Result Value Ref Range    PTT 79 (H) 22 - 37 sec   Lactic acid level STAT    Collection Time: 03/01/20 11:36 AM   Result Value Ref Range    Lactate for Sepsis Protocol 1.6 0.7 - 2.0 mmol/L   Glucose by meter    Collection Time: 03/01/20 12:30 PM   Result Value Ref Range    Glucose 156 (H) 70 - 99 mg/dL   EKG 12-lead, tracing only    Collection Time: 03/01/20 12:49 PM   Result Value Ref Range    Interpretation ECG Click View Image link to view waveform and result    Glucose by meter    Collection Time: 03/01/20  5:19 PM   Result Value Ref Range    Glucose 226 (H) 70 - 99 mg/dL   Glucose by meter    Collection Time: 03/01/20  9:18 PM   Result Value Ref Range    Glucose 162 (H) 70 - 99 mg/dL   Glucose by meter    Collection Time: 03/02/20  4:04 AM   Result Value Ref Range    Glucose 133 (H) 70 - 99 mg/dL   Basic metabolic panel    Collection Time: 03/02/20  6:19 AM   Result Value Ref Range    Sodium 136 133 - 144 mmol/L    Potassium 3.8 3.4 - 5.3 mmol/L    Chloride 102 94 - 109 mmol/L    Carbon Dioxide 29 20 - 32 mmol/L    Anion Gap 5 3 - 14 mmol/L    Glucose 130 (H) 70 - 99 mg/dL    Urea Nitrogen 24 7 - 30 mg/dL    Creatinine 1.11 0.66 - 1.25 mg/dL    GFR Estimate 69 >60 mL/min/[1.73_m2]    GFR Estimate If Black 79 >60 mL/min/[1.73_m2]    Calcium 8.2 (L) 8.5 - 10.1 mg/dL   CBC with platelets    Collection Time: 03/02/20  6:19 AM   Result Value Ref Range    WBC 8.6 4.0 - 11.0 10e9/L    RBC Count 3.36 (L) 4.4 - 5.9 10e12/L    Hemoglobin 9.5 (L) 13.3 - 17.7 g/dL    Hematocrit 32.0 (L) 40.0 - 53.0 %    MCV 95 78 - 100 fl    MCH 28.3 26.5 - 33.0 pg    MCHC 29.7 (L) 31.5 - 36.5 g/dL    RDW 18.0  (H) 10.0 - 15.0 %    Platelet Count 457 (H) 150 - 450 10e9/L   Magnesium    Collection Time: 20  6:19 AM   Result Value Ref Range    Magnesium 2.0 1.6 - 2.3 mg/dL   INR    Collection Time: 20  6:19 AM   Result Value Ref Range    INR 2.66 (H) 0.86 - 1.14   Partial thromboplastin time    Collection Time: 20  6:19 AM   Result Value Ref Range    PTT 58 (H) 22 - 37 sec   Digoxin level    Collection Time: 20  6:19 AM   Result Value Ref Range    Digoxin Level 0.8 0.5 - 2.0 ug/L       Recent Results (from the past 24 hour(s))   Echocardiogram Complete    Narrative    [id number]  [id number]  [id number]  [id number]^MATT^NAHUN^JIM           [facility],[place]  Echocardiography Laboratory  [address] [place] [id number]     Name: WOLFGANG LEACH  MRN: [id number]  : 1955  Study Date: 2020 10:06 AM  Age: 64 yrs  Gender: Male  Patient Location: [facility]  Reason For Study: Heart Failure  Ordering Physician: NAHUN GUTIERREZ  Referring Physician: SELF, REFERRED  Performed By: Yvonne Adan RDCS     BSA: 2.2 m2  Height: 67 in  Weight: 244 lb  HR: 103  BP: 72/52 mmHg  _____________________________________________________________________________  __        Procedure  Complete Portable Echo Adult. Contrast Optison. Optison (NDC #4110-1958-91)  given intravenously. Patient was given 6 ml mixture of 3 ml Optison and 6 ml  saline. 3 ml wasted.  _____________________________________________________________________________  __        Interpretation Summary  Left ventricular size is normal.  LVEF 20% based on biplane 2D tracing.  Mild to moderate right ventricular dilation is present.  Global right ventricular function is moderately reduced.  Pulmonary artery systolic pressure is normal.  Dilation of the inferior vena cava is present with abnormal respiratory  variation in diameter.  _____________________________________________________________________________  __        Left  Ventricle  Left ventricular size is normal. LVEF 20% based on biplane 2D tracing. Left  ventricular wall thickness is normal. Diastolic function not assessed due to  frequent ectopy. Abnormal septal motion consistent with left bundle branch  block is present.     Right Ventricle  Mild to moderate right ventricular dilation is present. Global right  ventricular function is moderately reduced.     Atria  Mild biatrial enlargement is present.     Mitral Valve  Moderate mitral insufficiency is present.        Aortic Valve  Aortic valve is normal in structure and function.     Tricuspid Valve  Moderate tricuspid insufficiency is present. The right ventricular systolic  pressure is approximated at 24.4 mmHg plus the right atrial pressure.  Pulmonary artery systolic pressure is normal.     Pulmonic Valve  The pulmonic valve cannot be assessed. Mild pulmonic insufficiency is present.     Vessels  The aorta root is normal. The pulmonary artery cannot be assessed. Dilation of  the inferior vena cava is present with abnormal respiratory variation in  diameter.     Compared to Previous Study  Previous study not available for comparison.     _____________________________________________________________________________  __  MMode/2D Measurements & Calculations  IVSd: 0.61 cm  LVIDd: 4.7 cm  LVIDs: 3.7 cm  LVPWd: 0.75 cm  FS: 21.7 %  LV mass(C)d: 99.1 grams  LV mass(C)dI: 45.0 grams/m2     Ao root diam: 2.9 cm  asc Aorta Diam: 2.5 cm  LVOT diam: 1.9 cm  LVOT area: 2.8 cm2     EF(MOD-bp): 21.5 %  LA Volume (BP): 84.8 ml  LA Volume Index (BP): 38.5 ml/m2  RWT: 0.32        Doppler Measurements & Calculations  PA acc time: 0.09 sec     PI end-d saumya: 154.0 cm/sec  TR max saumya: 247.0 cm/sec  TR max P.4 mmHg     _____________________________________________________________________________  __           Report approved by: Graciela Tomlinson 2020 12:05 PM      XR Chest Port 1 View    Narrative    XR CHEST PORT 1 VW  2020 4:21  PM      HISTORY: SG Placement    COMPARISON: None available    FINDINGS: Single AP chest radiograph. Rociada-Chucky catheter tip is in the  proximal right main pulmonary artery. Right central line tip and right  upper extremity PICC line tip at the lower SVC. Trachea is midline,  cardiomegaly. Bilateral perihilar streaky opacities. Mild increased  interstitial opacities in the right lung with ill-defined pulmonary  vascularity. No pneumothorax or pleural effusion. No acute osseous or  abdominal abnormality. Elevated left hemidiaphragm.      Impression    IMPRESSION:  1. Rociada-Chucky catheter tip at the proximal right main pulmonary artery.  2. Cardiomegaly with mild pulmonary edema, especially on the right.    I have personally reviewed the examination and initial interpretation  and I agree with the findings.    DONG SOLORIO MD   Cardiac Catheterization    Narrative      Successful insertion of a IABP in the RFA                Medications     Current Facility-Administered Medications   Medication     acetaminophen (TYLENOL) tablet 650 mg     albuterol (PROAIR HFA/PROVENTIL HFA/VENTOLIN HFA) 108 (90 Base) MCG/ACT inhaler 2 puff     allopurinol (ZYLOPRIM) tablet 200 mg     amiodarone (PACERONE) tablet 400 mg    Followed by     [START ON 3/8/2020] amiodarone (PACERONE) tablet 200 mg     ampicillin (OMNIPEN) 2 g vial to attach to  ml bag     aspirin (ASA) chewable tablet 81 mg     atorvastatin (LIPITOR) tablet 20 mg     calcium carbonate (TUMS) chewable tablet 500 mg     co-enzyme Q-10 capsule 200 mg     dextrose 10% infusion     glucose gel 15-30 g    Or     dextrose 50 % injection 25-50 mL    Or     glucagon injection 1 mg     diclofenac (VOLTAREN) 1 % topical gel 4 g     digoxin (LANOXIN) tablet 125 mcg     FLUoxetine (PROzac) capsule 20 mg     hydrALAZINE (APRESOLINE) tablet 100 mg     HYDROmorphone (DILAUDID) tablet 2 mg     insulin aspart (NovoLOG) injection (RAPID ACTING)     insulin aspart (NovoLOG)  injection (RAPID ACTING)     insulin glargine (LANTUS PEN) injection 10 Units     lidocaine (LMX4) cream     lidocaine 1 % 0.1-1 mL     lisinopril (ZESTRIL) tablet 5 mg     magnesium oxide (MAG-OX) tablet 400 mg     magnesium sulfate 2 g in water intermittent infusion     magnesium sulfate 4 g in 100 mL sterile water (premade)     medication instruction     melatonin tablet 3 mg     methocarbamol (ROBAXIN) tablet 500 mg     multivitamin w/minerals (THERA-VIT-M) tablet 1 tablet     naloxone (NARCAN) injection 0.1-0.4 mg     pantoprazole (PROTONIX) EC tablet 40 mg     polyethylene glycol (MIRALAX) Packet 17 g     potassium chloride (KLOR-CON) Packet 20-40 mEq     potassium chloride 10 mEq in 100 mL intermittent infusion with 10 mg lidocaine     potassium chloride 10 mEq in 100 mL sterile water intermittent infusion (premix)     potassium chloride 20 mEq in 50 mL intermittent infusion     potassium chloride ER (KLOR-CON M) CR tablet 20-40 mEq     potassium phosphate 10 mmol in D5W 250 mL intermittent infusion     potassium phosphate 15 mmol in D5W 250 mL intermittent infusion     potassium phosphate 20 mmol in D5W 250 mL intermittent infusion     potassium phosphate 20 mmol in D5W 500 mL intermittent infusion     potassium phosphate 25 mmol in D5W 500 mL intermittent infusion     QUEtiapine (SEROquel) tablet 25 mg     Reason beta blocker order not selected     senna-docusate (SENOKOT-S/PERICOLACE) 8.6-50 MG per tablet 1 tablet    Or     senna-docusate (SENOKOT-S/PERICOLACE) 8.6-50 MG per tablet 2 tablet     sodium chloride (PF) 0.9% PF flush 3 mL     sodium chloride (PF) 0.9% PF flush 3 mL     Warfarin Therapy Reminder (Check START DATE - warfarin may be starting in the FUTURE)

## 2020-03-02 NOTE — PLAN OF CARE
Discharge Planner OT   Patient plan for discharge: not discussed  Current status: Pt CGA for sit>stand. Stood at sink for G/H and performed toilet transfer with CGA. Ambulated 450 ft with seated and standing rest breaks. Pt SOB and reporting new chest pain with activity, not resolving with rest. VSS with activity, HR 120s at rest at EOB, 130s with activity.  Barriers to return to prior living situation: Decreased ADL independence and activity tolerance  Recommendations for discharge: ARU  Rationale for recommendations: Increase functional endurance and ADL independence at rehab. Should be able to tolerate 3 hrs therapy at ARU.       Entered by: Benedicto Headley 03/02/2020 12:45 PM

## 2020-03-02 NOTE — PROGRESS NOTES
CLINICAL NUTRITION SERVICES - REASSESSMENT NOTE     Nutrition Prescription    RECOMMENDATIONS FOR MDs/PROVIDERS TO ORDER:  None at this time.     Malnutrition Status:    Patient does not meet two of the established criteria necessary for diagnosing malnutrition but is at risk for malnutrition    Recommendations already ordered by Registered Dietitian (RD):  None additionally at this time.     Future/Additional Recommendations:  1. Continue current diet, as ordered. Liberalize diet order if inadequate nutrition intake. Pt has increased protein needs.   2. Monitor fluid status and potential need for a fluid restriction, if warranted in the future.   3. Monitor BG control. Hgb A1c was 7.3 on 2/8/20. BG was 204 on 3/2. DM II hx.    4. Continue thera-vit-M, as ordered, to help ensure micronutrient needs are met.  5. Consider potential need for iron supplementation, if warranted.      EVALUATION OF THE PROGRESS TOWARD GOALS   Diet: 2 g Sodium diet since 2/28. Previously on a dysphagia 3 + thin liquid diet 2/21, regular diet 2/25, and NPO for a time period on 2/28. SLP signed off 2/29. Has orders for strawberry Boost Plus at 14:00    Intake:Good diet tolerance. Flowsheets indicate pt consuming 75% of meals with a fair appetite 2/25, 100% of meals 2/26, % of meals 2/27, and 100% of meals with a good appetite 2/28-3/1. Pt may be getting some outside food or food from the cafe. [employer] (meal ordering system) indicates food/beverages sent 2/28-3/1 totaled 1120 kcals and 70 g protein daily on average. This does not meet estimated needs of 1500 - 1850+ kcal/day (20 - 25 kcal/kg/day) and 110 - 150 g protein/day (1.5 - 2 g protein/day). Pt ordered two meals on 2/28 and three meals 2/29 and 3/1. Increased protein needs post-op LVAD placement. Per team, no nausea. Good appetite per RN on 3/1. Pt/family busy with LVAD teaching when attempting to see. Possible factors affecting oral intake include restrictive diet order,  post-op status, and food preferences.   Calorie counts:   2/26     1108 kcal         41 g protein    2/25     1175 kcal         45 g protein   2/24       356 kcal         10 g protein    Previous Nutrition Support Orders: Impact Peptide at 80 ml/hr x 12 hrs (6p-6a) provided, at goal, 1440 kcals (19 kcals), 90 g PRO (1.2 g protein), 739 ml H2O, 134 g CHO. and no fiber. Pt's TFs stopped on 2/27 and feeding tube was removed 2/27.      NEW FINDINGS   Wt Hx: 109 kg (9/10/18), 110.5 kg (10/7/19), 103.9 kg (12/17/19), 111.6 kg (2/6/20), 108.9 kg (2/6/20, admit), 94.8 kg (3/1/20) - Wt loss this admission, but difficult to assess with post-op status and diuresis. Pt may have a combination of actual wt loss and fluid loss.     MALNUTRITION  % Intake: < 75% for > 7 days (non-severe)  % Weight Loss: Difficult to assess with post-op status and diuresis.  Subcutaneous Fat Loss: None observed per previous RD. Pt was busy with LVAD teaching when attempting to see.   Muscle Loss: None observed per previous RD. Pt was busy with LVAD teaching when attempting to see.   Fluid Accumulation/Edema: Trace  Malnutrition Diagnosis: Patient does not meet two of the established criteria necessary for diagnosing malnutrition but is at risk for malnutrition    Previous Goals   Pt to consume >60% nutrition needs (ie 1100 kcal and 90 g protein daily).   Evaluation: Suspect meeting this kcal goal but not meeting protein goal.     Previous Nutrition Diagnosis  Inadequate oral intake r/t reduced intake 2/2 dysphagia diet modifications as evidenced by pt report, diet recall meeting <75% nutrition needs.   Evaluation: Unresolved. Updated below.     CURRENT NUTRITION DIAGNOSIS  Inadequate oral intake related to restrictive diet order, post-op status, and food preferences as evidenced by [employer] (meal ordering system) indicates food/beverages sent 2/28-3/1 totaled 1120 kcals and 70 g protein daily on average which does not meet estimated needs of 1500  - 1850+ kcal/day (20 - 25 kcal/kg/day) and 110 - 150 g protein/day (1.5 - 2 g protein/day).      INTERVENTIONS  Implementation  Medical food supplement therapy: Left an oral supplement menu in his room.   Nutrition education for nutrition relationship to health/disease: Left handouts in his room for post-op LVAD education. Left Nutrition Care Manual handout on the protein amounts of various foods and beverages and room service protein handout that depicts amounts of protein in various foods/beverages on the room service menu. Wrote his protein goal on this handout.     Goals  Patient to consume % of nutritionally adequate meal trays TID, or the equivalent with supplements/snacks.    Monitoring/Evaluation  Progress toward goals will be monitored and evaluated per protocol.     Nutrition will continue to follow.      Abby Woods, MS, RD, LD, [facility]   6C Pgr: [phone]

## 2020-03-02 NOTE — PROGRESS NOTES
"CVTS Daily Note  3/2/2020  Attending: Dr Jaramillo    S:   States that pain is being controlled. Denies any hallucinations.  Breathing is good. Working with flutter valve and IS.  Appetite is good. Denies any nausea.  +BMs  Denies any popping/clicking in his breast bone.  Ambulating  Was able to get some sleep.  Plans for ARU at the time of discharge.    O:   Vital signs:  Temp: 97.9  F (36.6  C) Temp src: Oral BP: 100/49   Heart Rate: 111 Resp: 16 SpO2: 94 % O2 Device: None (Room air) Oxygen Delivery: 1 LPM Height: (170 cm entered into optia) Weight: 94.8 kg (209 lb 1.6 oz)  Estimated body mass index is 32.75 kg/m  as calculated from the following:    Height as of this encounter: 1.702 m (5' 7\").    Weight as of this encounter: 94.8 kg (209 lb 1.6 oz).    MAPs: 65 - 91  Gen: up in chair, comfortable, -O2  CT - serous output, -leak  CV: LVAD humming, -edema LE  Pulm: CTA, IS 0849-9882 ml   Abd: BS+, NT/ND, soft  Derm: sternal incision D/I  Lines: driveline dressing clean and dry   LVAD: speed 5500, flow 4.3 - 4.6, PI 2.9 - 3.5, power 4.3       Labs:  BMP  Recent Labs   Lab 03/02/20  0619 03/01/20  0631 02/29/20  1350 02/29/20  0606 02/28/20  0444    136  --  139 139   POTASSIUM 3.8 3.6 3.7 3.3* 3.4   CHLORIDE 102 101  --  99 98   CALOS 8.2* 8.0*  --  8.2* 8.6   CO2 29 32  --  36* 37*   BUN 24 25  --  36* 40*   CR 1.11 1.06  --  1.29* 1.48*   * 129*  --  131* 135*     CBC  Recent Labs   Lab 03/02/20  0619 03/01/20  0631 02/29/20  0606 02/28/20  0444   WBC 8.6 8.6 9.5 11.7*   RBC 3.36* 3.32* 3.27* 3.64*   HGB 9.5* 9.3* 9.5* 10.4*   HCT 32.0* 31.6* 31.6* 35.0*   MCV 95 95 97 96   MCH 28.3 28.0 29.1 28.6   MCHC 29.7* 29.4* 30.1* 29.7*   RDW 18.0* 18.1* 18.4* 18.8*   * 446 457* 486*     INR  Recent Labs   Lab 03/02/20  0619 03/01/20  0631 02/29/20  0606 02/28/20  0444   INR 2.66* 3.04* 2.52* 1.98*      Hepatic Panel   Lab Results   Component Value Date    AST 31 02/24/2020     Lab Results   Component " Value Date    ALT 19 02/24/2020     Lab Results   Component Value Date    ALBUMIN 1.8 02/24/2020     GLUCOSE:   Recent Labs   Lab 03/02/20  0619 03/02/20  0404 03/01/20  2118 03/01/20  1719 03/01/20  1230 03/01/20  0712 03/01/20  0631 03/01/20  0417  02/29/20  0606  02/28/20  0444  02/27/20  1540  02/27/20  0405   *  --   --   --   --   --  129*  --   --  131*  --  135*  --  291*  --  236*   BGM  --  133* 162* 226* 156* 131*  --  138*   < >  --    < >  --    < >  --    < >  --     < > = values in this interval not displayed.       Imaging:  reviewed recent imaging     A/P:   Eliseo Tanner is a 64 year old male with PMHx of DMT2, ABHINAV, CKD III, HTN, and CAD with chronic HF. He is now status post LVAD placement HeartMate III on 2/18 with Dr. Jaramillo and Dr Apodaca.       Neuro:   - Neuro intact  - Pain; tylenol and oxycodone  - Depression; fluoxetine     CV:   - Hx of chronic systolic heart failure with EF 15-20%  - Atrial Fibrillation, EP following, cardioversion 2/28. Amio 400 mg PO BID with taper written, digoxin 125 mcg.    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 Bivalirudin gtt discontinued. Coumadin goal INR 2-3. Chromogenic Factor 10 32% (goal 20-40%). Will stop chromogenic factor X blood draws. INR 2.66  - HTN; hydralazine 100 q 8 hrs, lisinopril 5 mg  - VT - EP recommends ICD placement prior to discharge      Pulm:   - Hx ABHINAV on CPAP   - Pulm toilet, IS, activity and deep breathing   - Supplemental O2 PRN to keep sats > 92%. Wean off as tolerated.      ID:   - WBC WNL, afebrile, no signs or symptoms of infection   - Bacteremia 2/13 and 2/15; ampicillin 2 grams q 6 hrs (14 days after removal of all central venous catheters (2/26 - 3/11))   - ID recommends surveillance cultures every week x 4 weeks after stopping all antibiotics (note on 2/25/20)     GI / FEN:  - Reg diet, bowel regimen  - Hx GERD.      Renal / :   - CKD III; creatinine 1.11, adequate UOP.   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 On Bilvalirudin which was stopped     Endo:   - Hx Gout; allopurinol 200 mg daily   - T2DM; sliding scale insulin and lantus 10 units (change lantus 10 units to q hs)     Dispo:   - 6C since 2/27  - Anticipate DC to ARU per PT/OT recs, likely this week     Neftaly Huff PA-C  [phone]

## 2020-03-02 NOTE — PLAN OF CARE
D: S/p LVAD HM 3 2/18 and s/p cardioversion 2/28 for Afib/flutter. Hx. Chronic HF, HTN, CKD2, DM2, ABHINAV.  I/A: A&Ox4. [place]. VSS on RA/Cpap at HS. Acc Jun/Afib/flutter 110s-120s. LVAD #s wnl, no alarms. Denies pain. Abx infused per orders.  overnight. L pleural CT x2 to suxn, no air leaks, minimal serosang output, drsgs CDI. Adequate uop. 1-2 assist w/walker. Appeared to rest comfortably overnight.   P: LVAD teaching continues. Needs ICD prior to discharge. Continue to monitor and notify CVTS with questions/concerns.

## 2020-03-03 ENCOUNTER — APPOINTMENT (OUTPATIENT)
Dept: EDUCATION SERVICES | Facility: CLINIC | Age: 65
End: 2020-03-03
Payer: MEDICARE

## 2020-03-03 ENCOUNTER — APPOINTMENT (OUTPATIENT)
Dept: GENERAL RADIOLOGY | Facility: CLINIC | Age: 65
DRG: 001 | End: 2020-03-03
Attending: PHYSICIAN ASSISTANT
Payer: MEDICARE

## 2020-03-03 ENCOUNTER — APPOINTMENT (OUTPATIENT)
Dept: OCCUPATIONAL THERAPY | Facility: CLINIC | Age: 65
DRG: 001 | End: 2020-03-03
Attending: INTERNAL MEDICINE
Payer: MEDICARE

## 2020-03-03 DIAGNOSIS — I48.3 TYPICAL ATRIAL FLUTTER (H): Primary | ICD-10-CM

## 2020-03-03 PROBLEM — I46.9 CARDIAC ARREST (H): Status: ACTIVE | Noted: 2020-02-06

## 2020-03-03 LAB
ANION GAP SERPL CALCULATED.3IONS-SCNC: 6 MMOL/L (ref 3–14)
APTT PPP: 62 SEC (ref 22–37)
BUN SERPL-MCNC: 24 MG/DL (ref 7–30)
CALCIUM SERPL-MCNC: 8.3 MG/DL (ref 8.5–10.1)
CHLORIDE SERPL-SCNC: 102 MMOL/L (ref 94–109)
CO2 SERPL-SCNC: 27 MMOL/L (ref 20–32)
CREAT SERPL-MCNC: 1.39 MG/DL (ref 0.66–1.25)
ERYTHROCYTE [DISTWIDTH] IN BLOOD BY AUTOMATED COUNT: 17.9 % (ref 10–15)
GFR SERPL CREATININE-BSD FRML MDRD: 52 ML/MIN/{1.73_M2}
GLUCOSE BLDC GLUCOMTR-MCNC: 116 MG/DL (ref 70–99)
GLUCOSE BLDC GLUCOMTR-MCNC: 123 MG/DL (ref 70–99)
GLUCOSE BLDC GLUCOMTR-MCNC: 141 MG/DL (ref 70–99)
GLUCOSE BLDC GLUCOMTR-MCNC: 147 MG/DL (ref 70–99)
GLUCOSE BLDC GLUCOMTR-MCNC: 154 MG/DL (ref 70–99)
GLUCOSE BLDC GLUCOMTR-MCNC: 219 MG/DL (ref 70–99)
GLUCOSE SERPL-MCNC: 132 MG/DL (ref 70–99)
HCT VFR BLD AUTO: 31 % (ref 40–53)
HGB BLD-MCNC: 9.4 G/DL (ref 13.3–17.7)
INR PPP: 2.64 (ref 0.86–1.14)
MAGNESIUM SERPL-MCNC: 1.9 MG/DL (ref 1.6–2.3)
MCH RBC QN AUTO: 28.7 PG (ref 26.5–33)
MCHC RBC AUTO-ENTMCNC: 30.3 G/DL (ref 31.5–36.5)
MCV RBC AUTO: 95 FL (ref 78–100)
PLATELET # BLD AUTO: 476 10E9/L (ref 150–450)
POTASSIUM SERPL-SCNC: 3.8 MMOL/L (ref 3.4–5.3)
RBC # BLD AUTO: 3.28 10E12/L (ref 4.4–5.9)
SODIUM SERPL-SCNC: 136 MMOL/L (ref 133–144)
WBC # BLD AUTO: 9.7 10E9/L (ref 4–11)

## 2020-03-03 PROCEDURE — 83735 ASSAY OF MAGNESIUM: CPT | Performed by: PHYSICIAN ASSISTANT

## 2020-03-03 PROCEDURE — 97530 THERAPEUTIC ACTIVITIES: CPT | Mod: GO

## 2020-03-03 PROCEDURE — 25000132 ZZH RX MED GY IP 250 OP 250 PS 637: Mod: GY | Performed by: STUDENT IN AN ORGANIZED HEALTH CARE EDUCATION/TRAINING PROGRAM

## 2020-03-03 PROCEDURE — 71046 X-RAY EXAM CHEST 2 VIEWS: CPT

## 2020-03-03 PROCEDURE — 36415 COLL VENOUS BLD VENIPUNCTURE: CPT | Performed by: PHYSICIAN ASSISTANT

## 2020-03-03 PROCEDURE — 21400000 ZZH R&B CCU UMMC

## 2020-03-03 PROCEDURE — 25000132 ZZH RX MED GY IP 250 OP 250 PS 637: Mod: GY | Performed by: THORACIC SURGERY (CARDIOTHORACIC VASCULAR SURGERY)

## 2020-03-03 PROCEDURE — 85610 PROTHROMBIN TIME: CPT | Performed by: PHYSICIAN ASSISTANT

## 2020-03-03 PROCEDURE — 85027 COMPLETE CBC AUTOMATED: CPT | Performed by: PHYSICIAN ASSISTANT

## 2020-03-03 PROCEDURE — 00000146 ZZHCL STATISTIC GLUCOSE BY METER IP

## 2020-03-03 PROCEDURE — 85730 THROMBOPLASTIN TIME PARTIAL: CPT | Performed by: PHYSICIAN ASSISTANT

## 2020-03-03 PROCEDURE — 25000128 H RX IP 250 OP 636: Performed by: STUDENT IN AN ORGANIZED HEALTH CARE EDUCATION/TRAINING PROGRAM

## 2020-03-03 PROCEDURE — 84520 ASSAY OF UREA NITROGEN: CPT | Performed by: STUDENT IN AN ORGANIZED HEALTH CARE EDUCATION/TRAINING PROGRAM

## 2020-03-03 PROCEDURE — 25000128 H RX IP 250 OP 636: Performed by: PHYSICIAN ASSISTANT

## 2020-03-03 PROCEDURE — 87040 BLOOD CULTURE FOR BACTERIA: CPT | Performed by: PHYSICIAN ASSISTANT

## 2020-03-03 PROCEDURE — 71045 X-RAY EXAM CHEST 1 VIEW: CPT

## 2020-03-03 PROCEDURE — 80048 BASIC METABOLIC PNL TOTAL CA: CPT | Performed by: PHYSICIAN ASSISTANT

## 2020-03-03 RX ORDER — MAGNESIUM SULFATE HEPTAHYDRATE 40 MG/ML
2 INJECTION, SOLUTION INTRAVENOUS
Status: CANCELLED | OUTPATIENT
Start: 2020-03-03

## 2020-03-03 RX ORDER — POTASSIUM CHLORIDE 1500 MG/1
20 TABLET, EXTENDED RELEASE ORAL
Status: CANCELLED | OUTPATIENT
Start: 2020-03-03

## 2020-03-03 RX ORDER — HYDROMORPHONE HYDROCHLORIDE 1 MG/ML
0.3 INJECTION, SOLUTION INTRAMUSCULAR; INTRAVENOUS; SUBCUTANEOUS ONCE
Status: COMPLETED | OUTPATIENT
Start: 2020-03-03 | End: 2020-03-03

## 2020-03-03 RX ORDER — POTASSIUM CHLORIDE 1500 MG/1
40 TABLET, EXTENDED RELEASE ORAL
Status: CANCELLED | OUTPATIENT
Start: 2020-03-03

## 2020-03-03 RX ORDER — LIDOCAINE 40 MG/G
CREAM TOPICAL
Status: DISCONTINUED | OUTPATIENT
Start: 2020-03-03 | End: 2020-03-05

## 2020-03-03 RX ORDER — LIDOCAINE 40 MG/G
CREAM TOPICAL
Status: CANCELLED | OUTPATIENT
Start: 2020-03-03

## 2020-03-03 RX ADMIN — AMIODARONE HYDROCHLORIDE 400 MG: 200 TABLET ORAL at 08:43

## 2020-03-03 RX ADMIN — HYDROMORPHONE HYDROCHLORIDE 0.3 MG: 1 INJECTION, SOLUTION INTRAMUSCULAR; INTRAVENOUS; SUBCUTANEOUS at 13:20

## 2020-03-03 RX ADMIN — METHOCARBAMOL 500 MG: 500 TABLET, FILM COATED ORAL at 20:04

## 2020-03-03 RX ADMIN — HYDRALAZINE HYDROCHLORIDE 100 MG: 100 TABLET, FILM COATED ORAL at 00:24

## 2020-03-03 RX ADMIN — AMPICILLIN SODIUM 2 G: 2 INJECTION, POWDER, FOR SOLUTION INTRAMUSCULAR; INTRAVENOUS at 08:32

## 2020-03-03 RX ADMIN — ATORVASTATIN CALCIUM 20 MG: 20 TABLET, FILM COATED ORAL at 20:04

## 2020-03-03 RX ADMIN — PANTOPRAZOLE SODIUM 40 MG: 40 TABLET, DELAYED RELEASE ORAL at 08:43

## 2020-03-03 RX ADMIN — AMPICILLIN SODIUM 2 G: 2 INJECTION, POWDER, FOR SOLUTION INTRAMUSCULAR; INTRAVENOUS at 13:31

## 2020-03-03 RX ADMIN — AMPICILLIN SODIUM 2 G: 2 INJECTION, POWDER, FOR SOLUTION INTRAMUSCULAR; INTRAVENOUS at 20:03

## 2020-03-03 RX ADMIN — INSULIN ASPART 1 UNITS: 100 INJECTION, SOLUTION INTRAVENOUS; SUBCUTANEOUS at 22:05

## 2020-03-03 RX ADMIN — MAGNESIUM SULFATE 2 G: 2 INJECTION INTRAVENOUS at 08:32

## 2020-03-03 RX ADMIN — HYDRALAZINE HYDROCHLORIDE 100 MG: 100 TABLET, FILM COATED ORAL at 08:42

## 2020-03-03 RX ADMIN — INSULIN ASPART 4 UNITS: 100 INJECTION, SOLUTION INTRAVENOUS; SUBCUTANEOUS at 18:50

## 2020-03-03 RX ADMIN — WARFARIN SODIUM 7 MG: 3 TABLET ORAL at 18:07

## 2020-03-03 RX ADMIN — HYDRALAZINE HYDROCHLORIDE 100 MG: 100 TABLET, FILM COATED ORAL at 16:10

## 2020-03-03 RX ADMIN — AMPICILLIN SODIUM 2 G: 2 INJECTION, POWDER, FOR SOLUTION INTRAMUSCULAR; INTRAVENOUS at 02:05

## 2020-03-03 RX ADMIN — Medication 200 MG: at 08:42

## 2020-03-03 RX ADMIN — POTASSIUM CHLORIDE 20 MEQ: 750 TABLET, EXTENDED RELEASE ORAL at 08:43

## 2020-03-03 RX ADMIN — ASPIRIN 81 MG CHEWABLE TABLET 81 MG: 81 TABLET CHEWABLE at 08:43

## 2020-03-03 RX ADMIN — DIGOXIN 125 MCG: 125 TABLET ORAL at 08:43

## 2020-03-03 RX ADMIN — MAGNESIUM OXIDE 400 MG: 400 TABLET ORAL at 08:43

## 2020-03-03 RX ADMIN — FLUOXETINE 20 MG: 20 CAPSULE ORAL at 08:43

## 2020-03-03 RX ADMIN — MULTIPLE VITAMINS W/ MINERALS TAB 1 TABLET: TAB at 08:43

## 2020-03-03 RX ADMIN — DICLOFENAC 4 G: 10 GEL TOPICAL at 16:09

## 2020-03-03 RX ADMIN — ACETAMINOPHEN 650 MG: 325 TABLET, FILM COATED ORAL at 22:49

## 2020-03-03 RX ADMIN — ALLOPURINOL 200 MG: 100 TABLET ORAL at 08:43

## 2020-03-03 RX ADMIN — INSULIN ASPART 1 UNITS: 100 INJECTION, SOLUTION INTRAVENOUS; SUBCUTANEOUS at 02:10

## 2020-03-03 RX ADMIN — LISINOPRIL 5 MG: 5 TABLET ORAL at 08:42

## 2020-03-03 ASSESSMENT — ACTIVITIES OF DAILY LIVING (ADL)
ADLS_ACUITY_SCORE: 16

## 2020-03-03 ASSESSMENT — PAIN DESCRIPTION - DESCRIPTORS
DESCRIPTORS: SORE
DESCRIPTORS: ACHING;SORE

## 2020-03-03 ASSESSMENT — MIFFLIN-ST. JEOR: SCORE: 1705.7

## 2020-03-03 NOTE — CONSULTS
03/03/20 1226 Shannan Navas RN       Completed Warfarin teaching with pt, wife and sister in law. Literature given: Guide to Warfarin Therapy; Warfarin Therapy Calendar

## 2020-03-03 NOTE — PLAN OF CARE
"BP (!) 103/96 (BP Location: Left arm)   Pulse 111   Temp 97.9  F (36.6  C) (Axillary)   Resp 16   Ht 1.702 m (5' 7\")   Wt 94.8 kg (209 lb 1.6 oz)   SpO2 98%   BMI 32.75 kg/m      Pt with new LVAD remains with two chest tubes with steady output.   LVAD with in normal limits.  Cardiac rhythms a fib, a flutter will be addressed with pace maker prior to discharge.     CVTS is primary notify team of changes.    "

## 2020-03-03 NOTE — PROGRESS NOTES
CLINICAL NUTRITION SERVICES - BRIEF NOTE   (See RD note on 3/2 for full assessment)     Reason for RD note: Following up on nutrition education    New Findings/Chart Review:  Oral Diet/Intake: 2 gm Na+ diet - pt feels he has enough options with the sodium restriction, but really wants cottage cheese (one of his favorite proteins). RD will allow to help encourage better protein intake. Appetite is improving. He ate an omelet at breakfast this morning. Pt likes the strawberry Boost Plus sent at 2pm.     Interventions:  1. Snack: PRN cottage cheese per pt request    2. Supplement:   -Ordered Gelatein (vary flavors) @ 10am & 8pm  -Continue Boost Plus strawberry @ 2pm    3. Nutrition education on post-VAD diet (pt and pt wife): Discussed importance of high protein intake post-VAD for up to 2 months for healing process. Reviewed protein sources handout and protein goal - encouraged use of ONS to help meet high protein needs, especially now no longer on TF. Encouraged to continue following low Na+ diet.     Future/Additional Recommendations:  -Monitor PO adequacy and tolerance to ONS/snacks    Nutrition will continue to follow per protocol.    Kavitha Isabel RD, LD  Pager: [phone]

## 2020-03-03 NOTE — PLAN OF CARE
D: S/p LVAD HM 3 2/18 and s/p cardioversion 2/28 for Afib/flutter. Hx of chronic HF, HTN, CKD2, DM2, ABHINAV.    I/A: A&Ox4. [place]. VSS on RA/CPAP at HS. Rhythm acc. [facility]/afib/flutter . LVAD WNL, no alarms. Teaching at bedside today as well as coumadin teaching via PLC. Denies pain. IV dilaudid prior to CT removal. Abx per orders. Adequate UOP. 1 assist w/ walker.     P: LVAD teaching continues. NPO at midnight fir ICD placement tomorrow. Continue to monitor and notify CVTS with questions/concerns.

## 2020-03-03 NOTE — PLAN OF CARE
D: admitted 2/06 for evaluation/management of advanced HF w/ arrhthymia now s/p LVAD HM 3 2/18 and s/p cardioversion 2/28 for Afib/flutter.   PMH: Chronic HF, CAD, HTN, CKD3, DM2, ABHINAV.     I: Monitored vitals and assessed pt status.   Changed:  - changed CT dressings. Minimal drainage and dried blood around CT sites. Minimal output overnight.  PRN: robaxin x1      A: A0x4. VSS on RA/CPAP at night. Tele shows Acc [place]/a-fib/a-flutter. Afebrile. Urinating adequately. [place]. LVAD #s wnl, no alarms. Pleural CT x2 to suxn, no air leaks, minimal serosang output. 1-2 assist w/walker. Pt slept well between cares.      P: Continue to monitor Pt status and report changes to CVTS. ICD prior to discharge.      POC: [id number]  Antonette Rebolledo RN on 3/3/2020 at 6:42 AM

## 2020-03-03 NOTE — PROGRESS NOTES
"CVTS Daily Note  3/3/2020  Attending: Dr Jaramillo    S:   No new issues over night. Pain controlled on current pain regimen. Denies SOB, CP.   +BMs  Ambulating  Plans for ARU at the time of discharge.    O:   Vital signs:  Temp: 97.5  F (36.4  C) Temp src: Axillary BP: 91/61 Pulse: 111 Heart Rate: 102 Resp: 18 SpO2: 95 % O2 Device: None (Room air) Oxygen Delivery: 1 LPM Height: (170 cm entered into optia) Weight: 96.7 kg (213 lb 3.2 oz)  Estimated body mass index is 33.39 kg/m  as calculated from the following:    Height as of this encounter: 1.702 m (5' 7\").    Weight as of this encounter: 96.7 kg (213 lb 3.2 oz).    MAPs: 60 - 90  Gen: up in chair, comfortable, -O2  CT - serous output, -leak, drainage in bilateral pleural's less than 200 cc/q24  CV: LVAD humming, -edema LE, no JVD  Pulm: CTA  Abd: BS+, NT/ND, soft  Derm: sternal incision D/I  Lines: driveline dressing clean and dry   LVAD: speed 5500, flow 4.3 - 4.6, PI 2.9 - 3.5, power 4.3       Labs:  BMP  Recent Labs   Lab 03/03/20  0612 03/02/20 0619 03/01/20  0631 02/29/20  1350 02/29/20  0606    136 136  --  139   POTASSIUM 3.8 3.8 3.6 3.7 3.3*   CHLORIDE 102 102 101  --  99   CALOS 8.3* 8.2* 8.0*  --  8.2*   CO2 27 29 32  --  36*   BUN 24 24 25  --  36*   CR 1.39* 1.11 1.06  --  1.29*   * 130* 129*  --  131*     CBC  Recent Labs   Lab 03/03/20  0612 03/02/20  0619 03/01/20  0631 02/29/20  0606   WBC 9.7 8.6 8.6 9.5   RBC 3.28* 3.36* 3.32* 3.27*   HGB 9.4* 9.5* 9.3* 9.5*   HCT 31.0* 32.0* 31.6* 31.6*   MCV 95 95 95 97   MCH 28.7 28.3 28.0 29.1   MCHC 30.3* 29.7* 29.4* 30.1*   RDW 17.9* 18.0* 18.1* 18.4*   * 457* 446 457*     INR  Recent Labs   Lab 03/03/20  0612 03/02/20  0619 03/01/20  0631 02/29/20  0606   INR 2.64* 2.66* 3.04* 2.52*      Hepatic Panel   Lab Results   Component Value Date    AST 31 02/24/2020     Lab Results   Component Value Date    ALT 19 02/24/2020     Lab Results   Component Value Date    ALBUMIN 1.8 02/24/2020 "     GLUCOSE:   Recent Labs   Lab 03/03/20  0635 03/03/20  0612 03/03/20  0209 03/02/20  2148 03/02/20  1610 03/02/20  1125 03/02/20  0619 03/02/20  0404  03/01/20  0631  02/29/20  0606  02/28/20  0444  02/27/20  1540   GLC  --  132*  --   --   --   --  130*  --   --  129*  --  131*  --  135*  --  291*   *  --  147* 144* 189* 204*  --  133*   < >  --    < >  --    < >  --    < >  --     < > = values in this interval not displayed.       Imaging:  reviewed recent imaging     A/P:   Eliseo Tanner is a 64 year old male with PMHx of DMT2, ABHINAV, CKD III, HTN, and CAD with chronic HF. He is now status post LVAD placement HeartMate III on 2/18 with Dr. Jaramillo and Dr Apodaca.       Neuro:   - Neuro intact  - Pain; tylenol and oxycodone  - Depression; fluoxetine     CV:   - Hx of chronic systolic heart failure with EF 15-20%  - Atrial Fibrillation, EP following, cardioversion 2/28. Amio 400 mg PO BID with taper written, digoxin 125 mcg.    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 Bivalirudin gtt discontinued stopped 3/2. Coumadin goal INR 2-3.   -  INR 2.64, goal 2-3  - HTN; hydralazine 100 q 8 hrs, lisinopril 5 mg  - VT - EP recommends ICD placement prior to discharge-contacted EP, working on time, possibly today. Checking with ID.       Pulm:   - Hx ABHINAV on CPAP   - Pulm toilet, IS, activity and deep breathing   - Supplemental O2 PRN to keep sats > 92%. Wean off as tolerated.   - Will remove pleural CT's today.      ID:   - WBC WNL, afebrile, no signs or symptoms of infection   - Bacteremia 2/13 and 2/15; ampicillin 2 grams q 6 hrs (14 days after removal of all central venous catheters (2/26 - 3/11))   - ID recommends surveillance cultures every week x 4 weeks after stopping all antibiotics (note on 2/25/20)  - Repeat cultures for surveillance drawn today 3/3, pending.      GI / FEN:  - Reg diet, bowel regimen  - Hx GERD.      Renal / :   - CKD III; creatinine 1.39, up from 1.11,  adequate UOP.   - Got lasix 40 IV yesterday. Will discuss dosing with Cardiology.      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 On Bilvalirudin which was stopped     Endo:   - Hx Gout; allopurinol 200 mg daily   - T2DM; sliding scale insulin and lantus 10 units (change lantus 10 units to q hs)     Dispo:   - 6C since 2/27  - Anticipate DC to ARU per PT/OT recs, possibly tomorrow/Thursday pending ICD placement.     Betty Aguilar PA-C  Cardiothoracic Surgery  Pager [phone]  March 3, 2020

## 2020-03-04 ENCOUNTER — APPOINTMENT (OUTPATIENT)
Dept: CT IMAGING | Facility: CLINIC | Age: 65
DRG: 001 | End: 2020-03-04
Attending: PHYSICIAN ASSISTANT
Payer: MEDICARE

## 2020-03-04 ENCOUNTER — APPOINTMENT (OUTPATIENT)
Dept: GENERAL RADIOLOGY | Facility: CLINIC | Age: 65
DRG: 001 | End: 2020-03-04
Attending: PHYSICIAN ASSISTANT
Payer: MEDICARE

## 2020-03-04 ENCOUNTER — APPOINTMENT (OUTPATIENT)
Dept: PHYSICAL THERAPY | Facility: CLINIC | Age: 65
DRG: 001 | End: 2020-03-04
Attending: INTERNAL MEDICINE
Payer: MEDICARE

## 2020-03-04 DIAGNOSIS — Z95.810 S/P ICD (INTERNAL CARDIAC DEFIBRILLATOR) PROCEDURE: Primary | ICD-10-CM

## 2020-03-04 LAB
ALBUMIN SERPL-MCNC: 1.8 G/DL (ref 3.4–5)
ALP SERPL-CCNC: 106 U/L (ref 40–150)
ALT SERPL W P-5'-P-CCNC: 21 U/L (ref 0–70)
ANION GAP SERPL CALCULATED.3IONS-SCNC: 7 MMOL/L (ref 3–14)
APTT PPP: 62 SEC (ref 22–37)
AST SERPL W P-5'-P-CCNC: 20 U/L (ref 0–45)
BILIRUB DIRECT SERPL-MCNC: 0.2 MG/DL (ref 0–0.2)
BILIRUB SERPL-MCNC: 0.3 MG/DL (ref 0.2–1.3)
BLD PROD TYP BPU: NORMAL
BLD UNIT ID BPU: 0
BLOOD PRODUCT CODE: NORMAL
BPU ID: NORMAL
BUN SERPL-MCNC: 20 MG/DL (ref 7–30)
CALCIUM SERPL-MCNC: 8.3 MG/DL (ref 8.5–10.1)
CHLORIDE SERPL-SCNC: 104 MMOL/L (ref 94–109)
CO2 SERPL-SCNC: 26 MMOL/L (ref 20–32)
CREAT SERPL-MCNC: 1.23 MG/DL (ref 0.66–1.25)
CRP SERPL-MCNC: 77 MG/L (ref 0–8)
ERYTHROCYTE [DISTWIDTH] IN BLOOD BY AUTOMATED COUNT: 16.4 % (ref 10–15)
ERYTHROCYTE [DISTWIDTH] IN BLOOD BY AUTOMATED COUNT: 17.8 % (ref 10–15)
GFR SERPL CREATININE-BSD FRML MDRD: 61 ML/MIN/{1.73_M2}
GLUCOSE BLDC GLUCOMTR-MCNC: 139 MG/DL (ref 70–99)
GLUCOSE BLDC GLUCOMTR-MCNC: 141 MG/DL (ref 70–99)
GLUCOSE BLDC GLUCOMTR-MCNC: 142 MG/DL (ref 70–99)
GLUCOSE BLDC GLUCOMTR-MCNC: 149 MG/DL (ref 70–99)
GLUCOSE BLDC GLUCOMTR-MCNC: 167 MG/DL (ref 70–99)
GLUCOSE BLDC GLUCOMTR-MCNC: 188 MG/DL (ref 70–99)
GLUCOSE SERPL-MCNC: 145 MG/DL (ref 70–99)
HCT VFR BLD AUTO: 28.1 % (ref 40–53)
HCT VFR BLD AUTO: 28.4 % (ref 40–53)
HGB BLD-MCNC: 6.7 G/DL (ref 13.3–17.7)
HGB BLD-MCNC: 7.9 G/DL (ref 13.3–17.7)
HGB BLD-MCNC: 8.2 G/DL (ref 13.3–17.7)
HGB BLD-MCNC: 8.8 G/DL (ref 13.3–17.7)
INR PPP: 1.84 (ref 0.86–1.14)
INR PPP: 2.88 (ref 0.86–1.14)
INR PPP: 2.97 (ref 0.86–1.14)
INTERPRETATION ECG - MUSE: NORMAL
LACTATE BLD-SCNC: 1.4 MMOL/L (ref 0.7–2)
MAGNESIUM SERPL-MCNC: 2 MG/DL (ref 1.6–2.3)
MAGNESIUM SERPL-MCNC: 2.1 MG/DL (ref 1.6–2.3)
MCH RBC QN AUTO: 27.6 PG (ref 26.5–33)
MCH RBC QN AUTO: 29.2 PG (ref 26.5–33)
MCHC RBC AUTO-ENTMCNC: 29.2 G/DL (ref 31.5–36.5)
MCHC RBC AUTO-ENTMCNC: 31 G/DL (ref 31.5–36.5)
MCV RBC AUTO: 94 FL (ref 78–100)
MCV RBC AUTO: 95 FL (ref 78–100)
NUM BPU REQUESTED: 2
PLATELET # BLD AUTO: 369 10E9/L (ref 150–450)
PLATELET # BLD AUTO: 448 10E9/L (ref 150–450)
POTASSIUM SERPL-SCNC: 3.9 MMOL/L (ref 3.4–5.3)
POTASSIUM SERPL-SCNC: 4 MMOL/L (ref 3.4–5.3)
PROT SERPL-MCNC: 5.7 G/DL (ref 6.8–8.8)
RADIOLOGIST FLAGS: ABNORMAL
RBC # BLD AUTO: 2.97 10E12/L (ref 4.4–5.9)
RBC # BLD AUTO: 3.01 10E12/L (ref 4.4–5.9)
SODIUM SERPL-SCNC: 137 MMOL/L (ref 133–144)
TRANSFUSION STATUS PATIENT QL: NORMAL
WBC # BLD AUTO: 11.3 10E9/L (ref 4–11)
WBC # BLD AUTO: 11.8 10E9/L (ref 4–11)

## 2020-03-04 PROCEDURE — 25000132 ZZH RX MED GY IP 250 OP 250 PS 637: Mod: GY | Performed by: STUDENT IN AN ORGANIZED HEALTH CARE EDUCATION/TRAINING PROGRAM

## 2020-03-04 PROCEDURE — 71250 CT THORAX DX C-: CPT

## 2020-03-04 PROCEDURE — 86140 C-REACTIVE PROTEIN: CPT | Performed by: NURSE PRACTITIONER

## 2020-03-04 PROCEDURE — 71045 X-RAY EXAM CHEST 1 VIEW: CPT

## 2020-03-04 PROCEDURE — 20000004 ZZH R&B ICU UMMC

## 2020-03-04 PROCEDURE — 80076 HEPATIC FUNCTION PANEL: CPT | Performed by: NURSE PRACTITIONER

## 2020-03-04 PROCEDURE — 97116 GAIT TRAINING THERAPY: CPT | Mod: GP

## 2020-03-04 PROCEDURE — P9016 RBC LEUKOCYTES REDUCED: HCPCS | Performed by: PHYSICIAN ASSISTANT

## 2020-03-04 PROCEDURE — 86901 BLOOD TYPING SEROLOGIC RH(D): CPT | Performed by: PHYSICIAN ASSISTANT

## 2020-03-04 PROCEDURE — 0W9930Z DRAINAGE OF RIGHT PLEURAL CAVITY WITH DRAINAGE DEVICE, PERCUTANEOUS APPROACH: ICD-10-PCS | Performed by: INTERNAL MEDICINE

## 2020-03-04 PROCEDURE — 25000128 H RX IP 250 OP 636: Performed by: STUDENT IN AN ORGANIZED HEALTH CARE EDUCATION/TRAINING PROGRAM

## 2020-03-04 PROCEDURE — 25000125 ZZHC RX 250: Performed by: PHYSICIAN ASSISTANT

## 2020-03-04 PROCEDURE — 85027 COMPLETE CBC AUTOMATED: CPT | Performed by: THORACIC SURGERY (CARDIOTHORACIC VASCULAR SURGERY)

## 2020-03-04 PROCEDURE — 85018 HEMOGLOBIN: CPT | Performed by: PHYSICIAN ASSISTANT

## 2020-03-04 PROCEDURE — 36415 COLL VENOUS BLD VENIPUNCTURE: CPT | Performed by: THORACIC SURGERY (CARDIOTHORACIC VASCULAR SURGERY)

## 2020-03-04 PROCEDURE — 00000146 ZZHCL STATISTIC GLUCOSE BY METER IP

## 2020-03-04 PROCEDURE — 93010 ELECTROCARDIOGRAM REPORT: CPT | Performed by: INTERNAL MEDICINE

## 2020-03-04 PROCEDURE — 40000344 ZZHCL STATISTIC THAWING COMPONENT: Performed by: PHYSICIAN ASSISTANT

## 2020-03-04 PROCEDURE — 85730 THROMBOPLASTIN TIME PARTIAL: CPT | Performed by: PHYSICIAN ASSISTANT

## 2020-03-04 PROCEDURE — 83605 ASSAY OF LACTIC ACID: CPT | Performed by: THORACIC SURGERY (CARDIOTHORACIC VASCULAR SURGERY)

## 2020-03-04 PROCEDURE — 36415 COLL VENOUS BLD VENIPUNCTURE: CPT | Performed by: PHYSICIAN ASSISTANT

## 2020-03-04 PROCEDURE — 93005 ELECTROCARDIOGRAM TRACING: CPT

## 2020-03-04 PROCEDURE — 99233 SBSQ HOSP IP/OBS HIGH 50: CPT | Mod: GC | Performed by: INTERNAL MEDICINE

## 2020-03-04 PROCEDURE — 25000128 H RX IP 250 OP 636: Performed by: PHYSICIAN ASSISTANT

## 2020-03-04 PROCEDURE — 25800030 ZZH RX IP 258 OP 636: Performed by: NURSE PRACTITIONER

## 2020-03-04 PROCEDURE — P9059 PLASMA, FRZ BETWEEN 8-24HOUR: HCPCS | Performed by: PHYSICIAN ASSISTANT

## 2020-03-04 PROCEDURE — 86900 BLOOD TYPING SEROLOGIC ABO: CPT | Performed by: PHYSICIAN ASSISTANT

## 2020-03-04 PROCEDURE — 80048 BASIC METABOLIC PNL TOTAL CA: CPT | Performed by: PHYSICIAN ASSISTANT

## 2020-03-04 PROCEDURE — 83735 ASSAY OF MAGNESIUM: CPT | Performed by: PHYSICIAN ASSISTANT

## 2020-03-04 PROCEDURE — 97530 THERAPEUTIC ACTIVITIES: CPT | Mod: GP

## 2020-03-04 PROCEDURE — 85610 PROTHROMBIN TIME: CPT | Performed by: NURSE PRACTITIONER

## 2020-03-04 PROCEDURE — 86923 COMPATIBILITY TEST ELECTRIC: CPT | Performed by: PHYSICIAN ASSISTANT

## 2020-03-04 PROCEDURE — 36415 COLL VENOUS BLD VENIPUNCTURE: CPT | Performed by: NURSE PRACTITIONER

## 2020-03-04 PROCEDURE — 85027 COMPLETE CBC AUTOMATED: CPT | Performed by: PHYSICIAN ASSISTANT

## 2020-03-04 PROCEDURE — 25000128 H RX IP 250 OP 636

## 2020-03-04 PROCEDURE — 83735 ASSAY OF MAGNESIUM: CPT | Performed by: NURSE PRACTITIONER

## 2020-03-04 PROCEDURE — 25000132 ZZH RX MED GY IP 250 OP 250 PS 637: Mod: GY | Performed by: PHYSICIAN ASSISTANT

## 2020-03-04 PROCEDURE — 86850 RBC ANTIBODY SCREEN: CPT | Performed by: PHYSICIAN ASSISTANT

## 2020-03-04 PROCEDURE — 40000986 XR CHEST PORT 1 VW

## 2020-03-04 PROCEDURE — 84132 ASSAY OF SERUM POTASSIUM: CPT | Performed by: NURSE PRACTITIONER

## 2020-03-04 PROCEDURE — 85610 PROTHROMBIN TIME: CPT | Performed by: PHYSICIAN ASSISTANT

## 2020-03-04 PROCEDURE — 25000132 ZZH RX MED GY IP 250 OP 250 PS 637: Mod: GY | Performed by: THORACIC SURGERY (CARDIOTHORACIC VASCULAR SURGERY)

## 2020-03-04 RX ORDER — POTASSIUM CHLORIDE 750 MG/1
20 TABLET, EXTENDED RELEASE ORAL
Status: COMPLETED | OUTPATIENT
Start: 2020-03-04 | End: 2020-03-08

## 2020-03-04 RX ORDER — LIDOCAINE 40 MG/G
CREAM TOPICAL
Status: DISCONTINUED | OUTPATIENT
Start: 2020-03-04 | End: 2020-03-20 | Stop reason: HOSPADM

## 2020-03-04 RX ORDER — FUROSEMIDE 10 MG/ML
20 INJECTION INTRAMUSCULAR; INTRAVENOUS ONCE
Status: COMPLETED | OUTPATIENT
Start: 2020-03-04 | End: 2020-03-04

## 2020-03-04 RX ORDER — HYDROMORPHONE HYDROCHLORIDE 1 MG/ML
INJECTION, SOLUTION INTRAMUSCULAR; INTRAVENOUS; SUBCUTANEOUS
Status: COMPLETED
Start: 2020-03-04 | End: 2020-03-04

## 2020-03-04 RX ORDER — HYDROMORPHONE HYDROCHLORIDE 1 MG/ML
0.3 INJECTION, SOLUTION INTRAMUSCULAR; INTRAVENOUS; SUBCUTANEOUS
Status: COMPLETED | OUTPATIENT
Start: 2020-03-04 | End: 2020-03-04

## 2020-03-04 RX ORDER — HYDROMORPHONE HYDROCHLORIDE 1 MG/ML
0.5 INJECTION, SOLUTION INTRAMUSCULAR; INTRAVENOUS; SUBCUTANEOUS
Status: COMPLETED | OUTPATIENT
Start: 2020-03-04 | End: 2020-03-04

## 2020-03-04 RX ORDER — LIDOCAINE HYDROCHLORIDE AND EPINEPHRINE 10; 10 MG/ML; UG/ML
30 INJECTION, SOLUTION INFILTRATION; PERINEURAL ONCE
Status: COMPLETED | OUTPATIENT
Start: 2020-03-04 | End: 2020-03-04

## 2020-03-04 RX ORDER — CEFAZOLIN SODIUM 2 G/100ML
2 INJECTION, SOLUTION INTRAVENOUS
Status: DISCONTINUED | OUTPATIENT
Start: 2020-03-04 | End: 2020-03-05 | Stop reason: HOSPADM

## 2020-03-04 RX ORDER — HYDROMORPHONE HYDROCHLORIDE 1 MG/ML
0.3 INJECTION, SOLUTION INTRAMUSCULAR; INTRAVENOUS; SUBCUTANEOUS ONCE
Status: COMPLETED | OUTPATIENT
Start: 2020-03-04 | End: 2020-03-04

## 2020-03-04 RX ORDER — MAGNESIUM SULFATE HEPTAHYDRATE 40 MG/ML
2 INJECTION, SOLUTION INTRAVENOUS
Status: COMPLETED | OUTPATIENT
Start: 2020-03-04 | End: 2020-03-07

## 2020-03-04 RX ORDER — POTASSIUM CHLORIDE 750 MG/1
20 TABLET, EXTENDED RELEASE ORAL ONCE
Status: COMPLETED | OUTPATIENT
Start: 2020-03-04 | End: 2020-03-04

## 2020-03-04 RX ORDER — SODIUM CHLORIDE 9 MG/ML
INJECTION, SOLUTION INTRAVENOUS CONTINUOUS
Status: DISCONTINUED | OUTPATIENT
Start: 2020-03-04 | End: 2020-03-08 | Stop reason: CLARIF

## 2020-03-04 RX ORDER — POTASSIUM CHLORIDE 750 MG/1
40 TABLET, EXTENDED RELEASE ORAL
Status: DISCONTINUED | OUTPATIENT
Start: 2020-03-04 | End: 2020-03-20 | Stop reason: HOSPADM

## 2020-03-04 RX ADMIN — ASPIRIN 81 MG CHEWABLE TABLET 81 MG: 81 TABLET CHEWABLE at 08:39

## 2020-03-04 RX ADMIN — MAGNESIUM SULFATE 2 G: 2 INJECTION INTRAVENOUS at 09:07

## 2020-03-04 RX ADMIN — METHOCARBAMOL 500 MG: 500 TABLET, FILM COATED ORAL at 09:06

## 2020-03-04 RX ADMIN — FLUOXETINE 20 MG: 20 CAPSULE ORAL at 08:39

## 2020-03-04 RX ADMIN — POTASSIUM CHLORIDE 20 MEQ: 750 TABLET, EXTENDED RELEASE ORAL at 13:54

## 2020-03-04 RX ADMIN — DIGOXIN 125 MCG: 125 TABLET ORAL at 08:39

## 2020-03-04 RX ADMIN — PANTOPRAZOLE SODIUM 40 MG: 40 TABLET, DELAYED RELEASE ORAL at 08:39

## 2020-03-04 RX ADMIN — HYDROMORPHONE HYDROCHLORIDE 0.3 MG: 1 INJECTION, SOLUTION INTRAMUSCULAR; INTRAVENOUS; SUBCUTANEOUS at 19:17

## 2020-03-04 RX ADMIN — MULTIPLE VITAMINS W/ MINERALS TAB 1 TABLET: TAB at 08:41

## 2020-03-04 RX ADMIN — HYDROMORPHONE HYDROCHLORIDE 0.3 MG: 1 INJECTION, SOLUTION INTRAMUSCULAR; INTRAVENOUS; SUBCUTANEOUS at 22:31

## 2020-03-04 RX ADMIN — AMPICILLIN SODIUM 2 G: 2 INJECTION, POWDER, FOR SOLUTION INTRAMUSCULAR; INTRAVENOUS at 02:29

## 2020-03-04 RX ADMIN — QUETIAPINE FUMARATE 25 MG: 25 TABLET ORAL at 20:04

## 2020-03-04 RX ADMIN — ACETAMINOPHEN 650 MG: 325 TABLET, FILM COATED ORAL at 20:04

## 2020-03-04 RX ADMIN — MIDAZOLAM 2 MG: 1 INJECTION INTRAMUSCULAR; INTRAVENOUS at 12:23

## 2020-03-04 RX ADMIN — AMPICILLIN SODIUM 2 G: 2 INJECTION, POWDER, FOR SOLUTION INTRAMUSCULAR; INTRAVENOUS at 13:49

## 2020-03-04 RX ADMIN — AMIODARONE HYDROCHLORIDE 400 MG: 200 TABLET ORAL at 08:39

## 2020-03-04 RX ADMIN — ALLOPURINOL 200 MG: 100 TABLET ORAL at 08:39

## 2020-03-04 RX ADMIN — FUROSEMIDE 20 MG: 10 INJECTION, SOLUTION INTRAVENOUS at 13:54

## 2020-03-04 RX ADMIN — HYDROMORPHONE HYDROCHLORIDE 0.5 MG: 1 INJECTION, SOLUTION INTRAMUSCULAR; INTRAVENOUS; SUBCUTANEOUS at 12:41

## 2020-03-04 RX ADMIN — ACETAMINOPHEN 650 MG: 325 TABLET, FILM COATED ORAL at 08:38

## 2020-03-04 RX ADMIN — LISINOPRIL 5 MG: 5 TABLET ORAL at 08:40

## 2020-03-04 RX ADMIN — ATORVASTATIN CALCIUM 20 MG: 20 TABLET, FILM COATED ORAL at 20:04

## 2020-03-04 RX ADMIN — Medication 200 MG: at 08:40

## 2020-03-04 RX ADMIN — HYDROMORPHONE HYDROCHLORIDE 2 MG: 2 TABLET ORAL at 06:25

## 2020-03-04 RX ADMIN — HYDRALAZINE HYDROCHLORIDE 100 MG: 100 TABLET, FILM COATED ORAL at 00:25

## 2020-03-04 RX ADMIN — HYDROMORPHONE HYDROCHLORIDE 2 MG: 2 TABLET ORAL at 15:51

## 2020-03-04 RX ADMIN — SODIUM CHLORIDE: 9 INJECTION, SOLUTION INTRAVENOUS at 10:25

## 2020-03-04 RX ADMIN — MAGNESIUM OXIDE 400 MG: 400 TABLET ORAL at 08:41

## 2020-03-04 RX ADMIN — AMPICILLIN SODIUM 2 G: 2 INJECTION, POWDER, FOR SOLUTION INTRAMUSCULAR; INTRAVENOUS at 21:02

## 2020-03-04 RX ADMIN — AMPICILLIN SODIUM 2 G: 2 INJECTION, POWDER, FOR SOLUTION INTRAMUSCULAR; INTRAVENOUS at 08:26

## 2020-03-04 RX ADMIN — HYDRALAZINE HYDROCHLORIDE 100 MG: 100 TABLET, FILM COATED ORAL at 08:39

## 2020-03-04 RX ADMIN — LIDOCAINE HYDROCHLORIDE,EPINEPHRINE BITARTRATE 30 ML: 10; .01 INJECTION, SOLUTION INFILTRATION; PERINEURAL at 12:16

## 2020-03-04 RX ADMIN — POTASSIUM CHLORIDE 20 MEQ: 750 TABLET, EXTENDED RELEASE ORAL at 08:41

## 2020-03-04 ASSESSMENT — ACTIVITIES OF DAILY LIVING (ADL)
ADLS_ACUITY_SCORE: 16

## 2020-03-04 ASSESSMENT — PAIN DESCRIPTION - DESCRIPTORS
DESCRIPTORS: DISCOMFORT
DESCRIPTORS: SORE;ACHING
DESCRIPTORS: DULL

## 2020-03-04 ASSESSMENT — MIFFLIN-ST. JEOR: SCORE: 1706.61

## 2020-03-04 NOTE — PROGRESS NOTES
"CVTS Daily Note  3/4/2020  Attending: Mac Jaramillo, *    S:   No overnight events.  NPO for ICD placement, now ICD on hold.   Pt SOB this AM with R sided chest pain. Lightheaded and jittery with activities. Hgb dropped 1 point.   Pt seen at bedside resting comfortably with eyes closed, feeling ok but not great.      Denies F/C/Sweats.  No CP, SOB, or calf pain.    Tolerating diet.  + BM.  + Flatus.    Ambulated well with assistance.    Pain level tolerable. Plan as per Neuro section below.     O:   Vital signs:  Temp: 97.7  F (36.5  C) Temp src: Oral BP: (!) 74/58 Pulse: 104 Heart Rate: 95 Resp: 18 SpO2: 98 % O2 Device: Nasal cannula Oxygen Delivery: 2 LPM Height: (170 cm entered into optia) Weight: 96.8 kg (213 lb 6.4 oz)  Estimated body mass index is 33.42 kg/m  as calculated from the following:    Height as of this encounter: 1.702 m (5' 7\").    Weight as of this encounter: 96.8 kg (213 lb 6.4 oz).    Weight; - 12 kg since admit and trending stable x 3 days.   24 hr Fluid status; net loss 1 L.     MAPs: 70 - 82   Gen: AAO x 3, pleasant, NAD but looks fatigued   CV: VAD RRR, S1S2 normal, no murmurs, rubs, or gallops.   Pulm: R apical diminished, left CTA, no rhonchi or wheezes  Abd: soft, non-tender, no guarding  Ext: trace peripheral edema, non-pitting  Incision: clean, dry, intact, no erythema  Chest Tube sites: dressings clean and dry  Lines: driveline dressing clean and dry   LVAD: speed 5500, flow 4.2 - 4.7, PI 1.8 - 3.3, power 4.2 - 4.3.       *  Procedure  *  Right pleural chest tube placed at bedside with 500 mL drained first few minutes, 700 mL total after first 20 minutes.    Patient already on ampicillin for bacteremia.       Labs:  BMP  Recent Labs   Lab 03/04/20  0757 03/04/20  0602 03/03/20  0612 03/02/20  0619 03/01/20  0631   NA  --  137 136 136 136   POTASSIUM 4.0 3.9 3.8 3.8 3.6   CHLORIDE  --  104 102 102 101   CALOS  --  8.3* 8.3* 8.2* 8.0*   CO2  --  26 27 29 32   BUN  --  20 24 24 25 "   CR  --  1.23 1.39* 1.11 1.06   GLC  --  145* 132* 130* 129*     CBC  Recent Labs   Lab 03/04/20  1128 03/04/20  0602 03/03/20  0612 03/02/20  0619 03/01/20  0631   WBC  --  11.3* 9.7 8.6 8.6   RBC  --  2.97* 3.28* 3.36* 3.32*   HGB 7.9* 8.2* 9.4* 9.5* 9.3*   HCT  --  28.1* 31.0* 32.0* 31.6*   MCV  --  95 95 95 95   MCH  --  27.6 28.7 28.3 28.0   MCHC  --  29.2* 30.3* 29.7* 29.4*   RDW  --  17.8* 17.9* 18.0* 18.1*   PLT  --  448 476* 457* 446     INR  Recent Labs   Lab 03/04/20  0757 03/04/20  0602 03/03/20  0612 03/02/20  0619   INR 2.97* 2.88* 2.64* 2.66*      Hepatic Panel   Lab Results   Component Value Date    AST 31 02/24/2020     Lab Results   Component Value Date    ALT 19 02/24/2020     Lab Results   Component Value Date    ALBUMIN 1.8 02/24/2020     GLUCOSE:   Recent Labs   Lab 03/04/20  1017 03/04/20  0620 03/04/20  0602 03/04/20  0228 03/03/20  2105 03/03/20  1804 03/03/20  1516  03/03/20  0612  03/02/20  0619  03/01/20  0631  02/29/20  0606  02/28/20  0444   GLC  --   --  145*  --   --   --   --   --  132*  --  130*  --  129*  --  131*  --  135*   * 149*  --  141* 141* 219* 116*   < >  --    < >  --    < >  --    < >  --    < >  --     < > = values in this interval not displayed.       Imaging:  reviewed recent imaging, large right apical pleural opacity       A/P:   Eliseo Tanner is a 64 year old male with PMHx of DMT2, ABHINAV, CKD III, HTN, and CAD with chronic HF. He is now status post LVAD placement HeartMate III on 2/18 with Dr. Jaramillo and Dr Apodaca.       Neuro:   - Neuro intact  - Pain; tylenol and oxycodone  - Depression; fluoxetine     CV:   - Hx of chronic systolic heart failure with EF 15-20%  - Atrial Fibrillation     - EP Consult, cardioversion 2/28. Amio 400 mg PO BID with taper, digoxin 125 mcg.    - HMIII LVAD     - ASA 81 mg, atorvastatin 20 mg.     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 Bivalirudin gtt discontinued stopped 3/2. Coumadin goal INR 2-3.     - INR 2.97, goal 2-3  - HTN; hydralazine 100  q 8 hrs, lisinopril 5 mg  - VT; EP recommended ICD placement prior to discharge. EP working on time     Pulm:   - Hx ABHINAV on CPAP   - Pulm toilet, IS, activity and deep breathing   - Supplemental O2 PRN to keep sats > 92%. Wean off as tolerated.      ID:   - WBC WNL, afebrile, no signs or symptoms of infection   - Bacteremia 2/13 and 2/15; ampicillin 2 grams q 6 hrs (14 days after removal of all central venous catheters (2/26 - 3/11))   - ID recommends surveillance cultures every week x 4 weeks after stopping all antibiotics (note on 2/25/20). Repeat cultures for surveillance drawn 3/3, NGTD.      GI / FEN:  - Reg diet, bowel regimen  - Hx GERD.      Renal / :   - CKD III; creatinine 1.23, adequate UOP.   - lasix 40 IV. Will discuss dosing with Cardiology.      Heme:   - Hgb 8.2 and 7.9, Plts WNL (slightly elevated).  Recheck Hgb 6 pm.   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 On Bilvalirudin which was stopped, may need to restart if INR < 2.0 tonight at 6 pm.       Endo:   - Hx Gout; allopurinol 200 mg daily   - T2DM; sliding scale insulin and lantus 10 units (change lantus 10 units to q hs)     Dispo:   - 6C since 2/27  - Anticipate DC to ARU per PT/OT recs, possibly tomorrow/Thursday pending ICD placement.       Staff surgeons have been informed of changes through both  verbal and written communication.      Diego Zuñiga PA-C  Cardiothoracic Surgery  Pager [phone]    10:24 AM   March 4, 2020

## 2020-03-04 NOTE — PLAN OF CARE
D: Pt who is s/p LVAD HM 3 on 2/18 and s/p cardioversion 2/28 for Afib/flutter. Hx of chronic HF, HTN, CKD2, DM2, ABHINAV.    I/A: Pt alert and oriented x4. Pt A.Fib/flutter with HRs 80-110s. LVAD free of alarms this shift; numbers WDL. Dressing CDI. CTs pulled today, dressing also CDI. Portable CXR done at the bedside. On RA with O2 sats >92%. Home CPAP at the bedside and in use when sleeping. Pt reports R shoulder pain as well as generalized body pain; Voltaren gel applied, PRN robaxin and tylenol given 1x. Pt appetite good, denies nausea. BG checks done ACHS; sliding scale insulin given 2x, carb coverage given 2x, bedtime glargine also given. Last BM this evening. Pt voiding. Up with +1 assist and walker in room. IV abx given as scheduled. First scrub prep done this evening.    P: Pt scheduled for ICD placement tomorrow. Pt to be NPO at MN. Overnight RN to release pre-op orders. Continue to monitor and assess pt with every encounter. Notify CVTS with any changes or concerns.

## 2020-03-04 NOTE — PLAN OF CARE
D: admitted 2/06 for evaluation/management of advanced HF w/ arrhthymia now s/p LVAD HM 3 2/18 and s/p cardioversion 2/28 for Afib/flutter.   PMH: Chronic HF, CAD, HTN, CKD3, DM2, ABHINAV.     I: Monitored vitals and assessed pt status.   Changed:  - NPO since MN for ICD placement today   Running: NS @ 5mL/hr into R PIV  PRN: dilaudid x1 for R sided pain      A: A0x4. VSS on RA/CPAP at night. Tele shows a-fib/a-flutter. Afebrile. Urinating adequately. [place]. LVAD PI numbers low overnight (1.8 - 2.4) - paged CVTS crosscover, pt asymptomatic and VSS. No LVAD alarms overnight, dressing CDI. 1-2 assist w/walker. Clipped chest this AM and pt got one scrub last night. Still needs one scrub before surgery. Pt slept well between cares.      P: ICD placement scheduled for 11:30 today. Continue to monitor Pt status and report changes to CVTS.      POC: [id number]  Antonette Rebolledo RN on 3/4/2020 at 8:16 AM

## 2020-03-04 NOTE — PROGRESS NOTES
LVAD Social Work Services Progress Note      Date of Initial Social Work Evaluation: 2/10/20  Collaborated with: Patient, Wife, and S-I-L    Data: Eliseo remains on 6C recovering post-VAD. Was implanted 2 weeks ago. Wife and S-I-L attended VAD support group yesterday. Today, patient was laying in bed with a towel on his head and had just been working with therapies, but experienced some light headedness/dizziness and was not feeling well. Therefore, he wasn't able to work with therapy. Was scheduled to have ICD placed today. Wife and S-I-L at bedside.  Intervention: Introduced this writer to patient as covering for usual SWer. Offered emotional support and inquired into questions. Discussed discharge planning to rehab on [place] and answered questions as they arose.  Assessment: Patient not very talkative, but acknowledged this writer. All verbalized understanding toward discharge planning process. No further questions noted.  Education provided by SW: Ongoing availability of SW this week and discharge planning process  Plan:    Discharge Plans in Progress: [facility] TCU/ARU    Barriers to d/c plan: Medical condition-getting ICU today    Follow up Plan: SW will continue to follow and assist with discharge planning.

## 2020-03-04 NOTE — PLAN OF CARE
OT 6C: Cancel. Pt working with PT and then having chest tube placed. Will reschedule for tomorrow.

## 2020-03-04 NOTE — PLAN OF CARE
Discharge Planner PT - 6C   Patient plan for discharge: Rehab.  Current status: Pt completes supine > sit with Jose. Pt transfers sit <> stand with Jose of 1-2, pending surface height. Pt ambulates 50ft x 2 with FWW and CGA. Pt needing seated rest break between bouts 2/2 fatigue and lighheadedness, pt unable to tolerate further progression of activity. VSS on RA and LVAD #s WNL, RN notified.  Barriers to return to prior living situation: Current medical status, deconditioning, decreased activity tolerance, weakness, level of A needed for mobility.  Recommendations for discharge: ARU.  Rationale for recommendations: Pt is below baseline for mobility, will require continued skilled therapy to progress strength, activity tolerance and safety/IND with functional mobility. Pt is motivated, has good family support, and anticipate pt will be able to tolerate 3 hrs of intensive therapy/day.

## 2020-03-04 NOTE — PROGRESS NOTES
Advanced Heart Failure Consult Progress Note  Eliseo Tanner MRN: [id number]  Age: 64 year old, : 1955  Date: 2020            Assessment and Plan:     Mr Eliseo Tanner is a 65yo M w/PMHx notable for chronic systolic heart failure, NICM, moderate CAD, HTN, ABHINAV on CPAP, DM2, CKDIII who is transferred to [facility] for evaluation and management of advanced heart failure with arrhythmia now s/p HM 3 on .    Today's plan (3/4):  -Holding Hydralazine 100mg PO Q8, lisinopril 5mg po daily on 3/4  -Still need ICD prior to discharge, discuss with EP re:timing   -Plan for chest tube per CVTS      Moderate CAD  Severe Mitral regurgitation  Chronic nonischemic systolic heart failure w/ reduced EF (15-20%) and exacerbation  New atrial fibrillation  NYHA Class III. Echo 2020: LVEF 15-20%; LVEDd 5.9 cm; 4+ MR. C 2019 w/ nonobstructive CAD w/ severe branch disease. Presented with increased wt gain, SOB, LE edema concerning for HF exacerbation, initially concerning for cardiogenic shock. Admitted to [facility] for further evaluation regarding need for advanced HF therapies. Patient is now s/p HM III placement on  with early post-op course complicated by bleeding that is slowing down as of  AM. Patient is persistently tachycardic to 130-140 with a wide-complex mostly-regular tachycardia. ECG obtained difficult to interpret given LVAD artifact, but afib w/ RVR and aberrancy vs. prison vs. AT vs. Slow VT. S/p DCCV on  back into sinus rhythm.  -Continue ASA 81, atorvastatin 20 mg  -Continue coumadin with goal INR 2-3 / CFX goal 20-40 (while on Bival)  -Holding Hydralazine 100mg PO Q8, lisinopril 5mg po daily on 3/4  -LVAD speed at 5500 RPM    #Retrosternal hematoma:  As seen on  CT chest at 5.1 cm.   -Plan for chest tube per CVTS    Proteus and Enterococcus bacteremia  Organisms growing from 2/2 blood cultures from . Blood cultured from  NGTD and 2/15 growing 1/2 S.epi,  "likely contaminant per ID.  ID consulted, appreciate help. Will decide on final duration of abx.  -daily blood cultures until negative  -Zosyn (2/13-2/14)  -Tobramycin (2/13-2/14)  -Vancomycin (2/13-2/17  -Meropenem (2/14-2/15)  -Ceftriaxone (2/16-2/28)  -Ampicillin (2/16-3/11) (14 days after swan removal on 2/26)    #Thrombocytopenia:  Concern for HIT given timing with respect to heparin exposure. HIT positive DAVINA negative. Antibody is now negative x2, thus no further indication for PLAX  -Heme consulted  -Bivalirudin gtt after LVAD surgery,goal chromogenic level 20-40     VFib requiring shock  Shocked x 3 prior to transfer, loaded with amio. No known prior history. Suspect related to underlying HF.   -Keep K>4, Mg>2  -Amio 400 mg PO QD  -Needs ICD for secondary prevention, will need to discuss with EP     Atrial flutter   S/p cardioversion 2/28/20   - Coumadin for anticoagulation, bival bridge       CODE: FULL CODE     Stanford Acuna M.D.                Subjective/Interval Events     No acute overnight events. This AM, patient denying pain, SOB, CP. Reports that he is sleeping better than in prior nights.          Objective     BP (!) 74/58 (BP Location: Left arm)   Pulse 104   Temp 97.7  F (36.5  C) (Oral)   Resp 18   Ht 1.702 m (5' 7\")   Wt 96.8 kg (213 lb 6.4 oz)   SpO2 98%   BMI 33.42 kg/m    Temp:  [97.1  F (36.2  C)-98.5  F (36.9  C)] 97.7  F (36.5  C)  Pulse:  [104] 104  Heart Rate:  [] 95  Resp:  [14-18] 18  BP: (68-96)/(43-79) 74/58  SpO2:  [92 %-99 %] 98 %  Wt Readings from Last 2 Encounters:   03/04/20 96.8 kg (213 lb 6.4 oz)     I/O last 3 completed shifts:  In: 920 [P.O.:720; I.V.:200]  Out: 1730 [Urine:1570; Chest Tube:160]      Gen: No acute distress  HEENT: NC/AT, +JVD   PULM/THORAX: CTAB  CV: normal rate, regular rhythm, normal S1 and S2, LVAD hum  ABD: Soft, NTND, bowel sounds present, no masses  EXT: WWP. Trace LE edema, clubbing or cyanosis.  NEURO: A&Ox3                Data: "     Recent Results (from the past 24 hour(s))   Glucose by meter    Collection Time: 03/03/20  3:16 PM   Result Value Ref Range    Glucose 116 (H) 70 - 99 mg/dL   Glucose by meter    Collection Time: 03/03/20  6:04 PM   Result Value Ref Range    Glucose 219 (H) 70 - 99 mg/dL   Glucose by meter    Collection Time: 03/03/20  9:05 PM   Result Value Ref Range    Glucose 141 (H) 70 - 99 mg/dL   Glucose by meter    Collection Time: 03/04/20  2:28 AM   Result Value Ref Range    Glucose 141 (H) 70 - 99 mg/dL   Basic metabolic panel    Collection Time: 03/04/20  6:02 AM   Result Value Ref Range    Sodium 137 133 - 144 mmol/L    Potassium 3.9 3.4 - 5.3 mmol/L    Chloride 104 94 - 109 mmol/L    Carbon Dioxide 26 20 - 32 mmol/L    Anion Gap 7 3 - 14 mmol/L    Glucose 145 (H) 70 - 99 mg/dL    Urea Nitrogen 20 7 - 30 mg/dL    Creatinine 1.23 0.66 - 1.25 mg/dL    GFR Estimate 61 >60 mL/min/[1.73_m2]    GFR Estimate If Black 70 >60 mL/min/[1.73_m2]    Calcium 8.3 (L) 8.5 - 10.1 mg/dL   CBC with platelets    Collection Time: 03/04/20  6:02 AM   Result Value Ref Range    WBC 11.3 (H) 4.0 - 11.0 10e9/L    RBC Count 2.97 (L) 4.4 - 5.9 10e12/L    Hemoglobin 8.2 (L) 13.3 - 17.7 g/dL    Hematocrit 28.1 (L) 40.0 - 53.0 %    MCV 95 78 - 100 fl    MCH 27.6 26.5 - 33.0 pg    MCHC 29.2 (L) 31.5 - 36.5 g/dL    RDW 17.8 (H) 10.0 - 15.0 %    Platelet Count 448 150 - 450 10e9/L   Magnesium    Collection Time: 03/04/20  6:02 AM   Result Value Ref Range    Magnesium 2.1 1.6 - 2.3 mg/dL   INR    Collection Time: 03/04/20  6:02 AM   Result Value Ref Range    INR 2.88 (H) 0.86 - 1.14   Partial thromboplastin time    Collection Time: 03/04/20  6:02 AM   Result Value Ref Range    PTT 62 (H) 22 - 37 sec   Glucose by meter    Collection Time: 03/04/20  6:20 AM   Result Value Ref Range    Glucose 149 (H) 70 - 99 mg/dL   EKG 12-lead, tracing only    Collection Time: 03/04/20  7:05 AM   Result Value Ref Range    Interpretation ECG Click View Image link to  view waveform and result    INR    Collection Time: 03/04/20  7:57 AM   Result Value Ref Range    INR 2.97 (H) 0.86 - 1.14   Magnesium    Collection Time: 03/04/20  7:57 AM   Result Value Ref Range    Magnesium 2.0 1.6 - 2.3 mg/dL   Potassium    Collection Time: 03/04/20  7:57 AM   Result Value Ref Range    Potassium 4.0 3.4 - 5.3 mmol/L   Hepatic panel    Collection Time: 03/04/20  7:57 AM   Result Value Ref Range    Bilirubin Direct 0.2 0.0 - 0.2 mg/dL    Bilirubin Total 0.3 0.2 - 1.3 mg/dL    Albumin 1.8 (L) 3.4 - 5.0 g/dL    Protein Total 5.7 (L) 6.8 - 8.8 g/dL    Alkaline Phosphatase 106 40 - 150 U/L    ALT 21 0 - 70 U/L    AST 20 0 - 45 U/L   CRP inflammation    Collection Time: 03/04/20  7:57 AM   Result Value Ref Range    CRP Inflammation 77.0 (H) 0.0 - 8.0 mg/L   Glucose by meter    Collection Time: 03/04/20 10:17 AM   Result Value Ref Range    Glucose 167 (H) 70 - 99 mg/dL   Lactic acid level STAT    Collection Time: 03/04/20 10:38 AM   Result Value Ref Range    Lactate for Sepsis Protocol 1.4 0.7 - 2.0 mmol/L   Plasma prepare order unit    Collection Time: 03/04/20 11:20 AM   Result Value Ref Range    Blood Component Type Plasma     Units Ordered 2    Blood component    Collection Time: 03/04/20 11:20 AM   Result Value Ref Range    Unit Number [id number]     Blood Component Type Plasma, Thawed     Division Number 00     Status of Unit Released to care unit 03/04/2020 1134     Blood Product Code Y5637H82     Unit Status ISS    Blood component    Collection Time: 03/04/20 11:20 AM   Result Value Ref Range    Unit Number [id number]     Blood Component Type Plasma, Thawed     Division Number 00     Status of Unit Released to care unit 03/04/2020 1155     Blood Product Code [id number]     Unit Status ISS    ABO/Rh type and screen    Collection Time: 03/04/20 11:21 AM   Result Value Ref Range    Units Ordered 1     ABO A     RH(D) Pos     Antibody Screen Neg     Test Valid Only At          [facility]  [facility]    Specimen Expires 2020     Crossmatch Red Blood Cells    Blood component    Collection Time: 20 11:21 AM   Result Value Ref Range    Unit Number [id number]     Blood Component Type Red Blood Cells Leukocyte Reduced     Division Number 00     Status of Unit Ready for patient 2020 1215     Blood Product Code [id number]     Unit Status JOSÉ MIGUEL    Hemoglobin    Collection Time: 20 11:28 AM   Result Value Ref Range    Hemoglobin 7.9 (L) 13.3 - 17.7 g/dL       Recent Results (from the past 24 hour(s))   Echocardiogram Complete    Narrative    [id number]  [id number]  [id number]  [id number]^MATT^NAHUN^JIM           [facility]  Echocardiography Laboratory  [address] [place] [id number]     Name: WOLFGANG LEACH  MRN: [id number]  : 1955  Study Date: 2020 10:06 AM  Age: 64 yrs  Gender: Male  Patient [facility]: [facility]  Reason For Study: Heart Failure  Ordering Physician: NAHUN GUTIERREZ  Referring Physician: SELF, REFERRED  Performed By: Yvonne dAan RDCS     BSA: 2.2 m2  Height: 67 in  Weight: 244 lb  HR: 103  BP: 72/52 mmHg  _____________________________________________________________________________  __        Procedure  Complete Portable Echo Adult. Contrast Optison. Optison (NDC #0049-7193-04)  given intravenously. Patient was given 6 ml mixture of 3 ml Optison and 6 ml  saline. 3 ml wasted.  _____________________________________________________________________________  __        Interpretation Summary  Left ventricular size is normal.  LVEF 20% based on biplane 2D tracing.  Mild to moderate right ventricular dilation is present.  Global right ventricular function is moderately reduced.  Pulmonary artery systolic pressure is normal.  Dilation of the inferior vena cava is present with abnormal respiratory  variation in  diameter.  _____________________________________________________________________________  __        Left Ventricle  Left ventricular size is normal. LVEF 20% based on biplane 2D tracing. Left  ventricular wall thickness is normal. Diastolic function not assessed due to  frequent ectopy. Abnormal septal motion consistent with left bundle branch  block is present.     Right Ventricle  Mild to moderate right ventricular dilation is present. Global right  ventricular function is moderately reduced.     Atria  Mild biatrial enlargement is present.     Mitral Valve  Moderate mitral insufficiency is present.        Aortic Valve  Aortic valve is normal in structure and function.     Tricuspid Valve  Moderate tricuspid insufficiency is present. The right ventricular systolic  pressure is approximated at 24.4 mmHg plus the right atrial pressure.  Pulmonary artery systolic pressure is normal.     Pulmonic Valve  The pulmonic valve cannot be assessed. Mild pulmonic insufficiency is present.     Vessels  The aorta root is normal. The pulmonary artery cannot be assessed. Dilation of  the inferior vena cava is present with abnormal respiratory variation in  diameter.     Compared to Previous Study  Previous study not available for comparison.     _____________________________________________________________________________  __  MMode/2D Measurements & Calculations  IVSd: 0.61 cm  LVIDd: 4.7 cm  LVIDs: 3.7 cm  LVPWd: 0.75 cm  FS: 21.7 %  LV mass(C)d: 99.1 grams  LV mass(C)dI: 45.0 grams/m2     Ao root diam: 2.9 cm  asc Aorta Diam: 2.5 cm  LVOT diam: 1.9 cm  LVOT area: 2.8 cm2     EF(MOD-bp): 21.5 %  LA Volume (BP): 84.8 ml  LA Volume Index (BP): 38.5 ml/m2  RWT: 0.32        Doppler Measurements & Calculations  PA acc time: 0.09 sec     PI end-d saumya: 154.0 cm/sec  TR max saumya: 247.0 cm/sec  TR max P.4 mmHg     _____________________________________________________________________________  __           Report approved by: WILLIAM  Graciela Gutierrez 02/07/2020 12:05 PM      XR Chest Port 1 View    Narrative    XR CHEST PORT 1 VW  2/7/2020 4:21 PM      HISTORY: SG Placement    COMPARISON: None available    FINDINGS: Single AP chest radiograph. [place]-Chucky catheter tip is in the  proximal right main pulmonary artery. Right central line tip and right  upper extremity PICC line tip at the lower SVC. Trachea is midline,  cardiomegaly. Bilateral perihilar streaky opacities. Mild increased  interstitial opacities in the right lung with ill-defined pulmonary  vascularity. No pneumothorax or pleural effusion. No acute osseous or  abdominal abnormality. Elevated left hemidiaphragm.      Impression    IMPRESSION:  1. [place]-Chucky catheter tip at the proximal right main pulmonary artery.  2. Cardiomegaly with mild pulmonary edema, especially on the right.    I have personally reviewed the examination and initial interpretation  and I agree with the findings.    DONG SOLORIO MD   Cardiac Catheterization    Narrative      Successful insertion of a IABP in the RFA                Medications

## 2020-03-04 NOTE — PROCEDURES
Chest Tube Placement Procedure Note  Preprocedure Diagnosis: right hemothorax  Postprocedure Diagnosis: right hemothorax  Procedure: Right sided chest tube placement .  (26 Fr in 4th intercostal space at anterior axillary line.  Secured at 13cm.)  Staff: Dr. Flores  Assistant: Diego Zuñiga PA-C   Medication: 2mg Versed, 0.5mg Dilaudid     Eliseo Benitez Ciro was prepped and draped in the usual fashion of the right chest. A timeout was performed identifying the correct patient and procedure. 30cc 1% lidocaine with epinephrine was locally injected into the subcutaneous tissue as well as periostium and the pleural space in the 4th intercostal space. The pleural space was entered with a Kay clamp and there was immediate thin, sanguinous output. A 26 Fr chest tube was placed guided in a posterior and superior direction. There was immediate evacuation of approximately 500cc of thin, sanguinous fluid. The patient tolerated the procedure well, and there were no complications.    A STAT CXR was ordered to confirm proper placement.    Dr Cordoba was immediately available for assistance.     Vargas Montaño MD  PGY-3 Surgery  [phone]

## 2020-03-04 NOTE — CONSULTS
Thoracic Surgery Consult  2020    Eliseo Tanner  : 1955    Date of Service: 3/4/2020 5:47 PM    Assessment and Plan:  Eliseo Tanner is a 64 year old male with a history of DMII, ABHINAV, CKD, HTN, CAD, and CHF s/p LVAD placement on , now with new spontaneous right hemothorax in setting of INR of 2.9. He had a chest tube placed this afternoon with ongoing sanguineous drainage requiring transfusion. His chest tube output is improving with INR correction, now  mL an hour, and he remains hemodynamically stable. Remaining collection in right thorax is near the apex and at least partly organized, may be in part extrapleural.    - Agree with ongoing correction of INR and transfer to CVICU for closer monitoring  - Patient currently stable, no urgent need for operative intervention  - Plan for thoracoscopic washout tomorrow  - Will place pre-op orders and obtain consent    Discussed with fellow, Dr. Mcnulty and staff, Dr. Racquel Sharma, PGY-3  General Surgery    History of Present Illness:    Eliseo Tanner is a 64 year old male with a history of DMII, ABHINAV, CKD, HTN, CAD, and CHF s/p LVAD placement on , now with new right hemothorax in setting of INR of 2.9. He was recovering well until last night when he developed right chest pain. He had some associated dizziness this morning. CXR obtained with increased right effusion. Right chest tube placed this afternoon with initial sanguineous output of ~800 mL. Since then has had  mL output/hr, closer to 50 mL the last 2 hours. Hgb dropped from 8.2 to 6.7 today. Has received 2 unit(s) FFP and in the process of receiving 2 unit(s) pRBCs. He currently reports he is feeling much better. No SOB. Has chest pain at the chest tube site. Denies any recent trauma or falls.    Past Medical History:  DMII  ABHINAV  CKD  HTN  CAD  CHF s/p LVAD placement on     Past Surgical History  Past Surgical History:   Procedure Laterality  Date     ANESTHESIA CARDIOVERSION N/A 2/28/2020    Procedure: ANESTHESIA, FOR CARDIOVERSION;  Surgeon: GENERIC ANESTHESIA PROVIDER;  Location: [facility] OR     CV CENTRAL VENOUS CATHETER PLACEMENT N/A 2/13/2020    Procedure: Central Venous Catheter Placement;  Surgeon: Chente Moss MD;  Location:  HEART CARDIAC CATH LAB     CV INTRA-AORTIC BALLOON PUMP INSERTION N/A 2/7/2020    Procedure: Intra-Aortic Balloon Pump Insertion;  Surgeon: Jose Baldwin MD;  Location:  HEART CARDIAC CATH LAB     CV INTRA-AORTIC BALLOON PUMP INSERTION N/A 2/13/2020    Procedure: Intra-Aortic Balloon Pump Insertion;  Surgeon: Chente Moss MD;  Location:  HEART CARDIAC CATH LAB     CV SWAN LUCIANA PROCEDURE N/A 2/13/2020    Procedure: Jewett Luciana Procedure;  Surgeon: Chente Moss MD;  Location:  HEART CARDIAC CATH LAB     INSERT VENTRICULAR ASSIST DEVICE LEFT (HEARTMATE II) N/A 2/18/2020    Procedure: INSERTION, LEFT VENTRICULAR ASSIST DEVICE (HEARTMATE III);  Surgeon: Mac Jaramillo MD;  Location:  OR       Family History:  No family history on file.    Social History:  Social History     Socioeconomic History     Marital status:      Spouse name: Not on file     Number of children: Not on file     Years of education: Not on file     Highest education level: Not on file   Occupational History     Not on file   Social Needs     Financial resource strain: Not on file     Food insecurity:     Worry: Not on file     Inability: Not on file     Transportation needs:     Medical: Not on file     Non-medical: Not on file   Tobacco Use     Smoking status: Not on file   Substance and Sexual Activity     Alcohol use: Not on file     Drug use: Not on file     Sexual activity: Not on file   Lifestyle     Physical activity:     Days per week: Not on file     Minutes per session: Not on file     Stress: Not on file   Relationships     Social connections:     Talks on phone: Not on  "file     Gets together: Not on file     Attends [employer] service: Not on file     Active member of club or organization: Not on file     Attends meetings of clubs or organizations: Not on file     Relationship status: Not on file     Intimate partner violence:     Fear of current or ex partner: Not on file     Emotionally abused: Not on file     Physically abused: Not on file     Forced sexual activity: Not on file   Other Topics Concern     Not on file   Social History Narrative     Not on file       Medications:  No current outpatient medications on file.       Allergies:     Allergies   Allergen Reactions     Heparin      HIT screen positive 2/14/20, reflex DAVINA negative; however heme recommended treating as if positive  HIT screen negative 2/11/20     Oxycodone Itching and Other (See Comments)       Review of Symptoms:  A 10 point review of symptoms has been conducted and is negative except for that mentioned in the above HPI.    Physical Exam:    Blood pressure 101/85, pulse 103, temperature 97.9  F (36.6  C), temperature source Oral, resp. rate 24, height 1.702 m (5' 7\"), weight 96.8 kg (213 lb 6.4 oz), SpO2 100 %.  Gen:    Lying in bed in NAD, A&OX3  HEENT: Normocephalic and atraumatic  CV:  Well perfused  Pulm:  Non-labored breathing on NC. Right sided chest tube with clots in tube, but still with sanguineous drainage. No air leak.  Abd:  Soft, non-tender, non-distended  Ext:  Warm and well perfused, no obvious deformities    Labs:  CBC RESULTS:   Recent Labs   Lab Test 03/04/20  1552  03/04/20  0602   WBC  --   --  11.3*   RBC  --   --  2.97*   HGB 6.7*   < > 8.2*   HCT  --   --  28.1*   MCV  --   --  95   MCH  --   --  27.6   MCHC  --   --  29.2*   RDW  --   --  17.8*   PLT  --   --  448    < > = values in this interval not displayed.     Last Basic Metabolic Panel:  Lab Results   Component Value Date     03/04/2020      Lab Results   Component Value Date    POTASSIUM 4.0 03/04/2020     Lab Results "   Component Value Date    CHLORIDE 104 03/04/2020     Lab Results   Component Value Date    CALOS 8.3 03/04/2020     Lab Results   Component Value Date    CO2 26 03/04/2020     Lab Results   Component Value Date    BUN 20 03/04/2020     Lab Results   Component Value Date    CR 1.23 03/04/2020     Lab Results   Component Value Date     03/04/2020       Imaging:  CT Chest pending, appears to have organized right apical fluid collection, chest tube draining hemothorax.

## 2020-03-04 NOTE — PLAN OF CARE
Discharge Planner OT   Patient plan for discharge: Not discussed.   Current status: Therapist educated pt on and reviewed sternal precautions and safety with functional transfers. Pt required CGA/Min A and vc's for transfer supine<->seated EOB. Pt required CGA and vc's with FWW for STS transfer. Pt ambulated ~400ft with CGA, vc's and several standing rest breaks. Pt tolerated session well. VSS.   Barriers to return to prior living situation: Decreased strength/endurance, Fatigue, SOB, Decreased independence with functional transfers and ADLs.   Recommendations for discharge: Anticipate discharge home with A and OP CR.   Rationale for recommendations: Pt will benefit from continued therapy to address barriers above and to maximize functional independence and activity tolerance.        Entered by: Phan Valderrama 03/03/2020 11:07 PM

## 2020-03-04 NOTE — PLAN OF CARE
D: S/p LVAD HM 3 (02/18), c/b R hemothorax (03/04), cardioversion (02/28) for Afib/flutter. Hx of chronic HF, HTN, CKD2, DM2, ABHINAV.     I/A: A&Ox4. VSS on 2L NC for comfort post procedure. Home CPAP at HS. [facility], no hearing aids available, per family at home. Rhythm remains afib/flutter . PI 1.7-1.9 late AM, has since resolved. Abx per orders. IV lasix x1, adequate UOP. R CT to sxn, output remains sanguinous, dressing reinforced- CXR pending.      P: LVAD teaching continues. Hgb/INR/CXR at 1800. ICD prior to discharge. Notify CVTS with changes/concerns.     Addendum: CXR unchanged per CVTS MD, CT to be done- ordered

## 2020-03-05 ENCOUNTER — ANESTHESIA EVENT (OUTPATIENT)
Dept: SURGERY | Facility: CLINIC | Age: 65
DRG: 001 | End: 2020-03-05
Payer: MEDICARE

## 2020-03-05 ENCOUNTER — ANESTHESIA (OUTPATIENT)
Dept: SURGERY | Facility: CLINIC | Age: 65
DRG: 001 | End: 2020-03-05
Payer: MEDICARE

## 2020-03-05 ENCOUNTER — APPOINTMENT (OUTPATIENT)
Dept: GENERAL RADIOLOGY | Facility: CLINIC | Age: 65
DRG: 001 | End: 2020-03-05
Attending: INTERNAL MEDICINE
Payer: MEDICARE

## 2020-03-05 ENCOUNTER — APPOINTMENT (OUTPATIENT)
Dept: OCCUPATIONAL THERAPY | Facility: CLINIC | Age: 65
DRG: 001 | End: 2020-03-05
Attending: INTERNAL MEDICINE
Payer: MEDICARE

## 2020-03-05 LAB
ANION GAP SERPL CALCULATED.3IONS-SCNC: 4 MMOL/L (ref 3–14)
APTT PPP: 43 SEC (ref 22–37)
BLD PROD TYP BPU: NORMAL
BLD PROD TYP BPU: NORMAL
BLD UNIT ID BPU: 0
BLD UNIT ID BPU: 0
BLOOD PRODUCT CODE: NORMAL
BLOOD PRODUCT CODE: NORMAL
BPU ID: NORMAL
BPU ID: NORMAL
BUN SERPL-MCNC: 17 MG/DL (ref 7–30)
CALCIUM SERPL-MCNC: 8.1 MG/DL (ref 8.5–10.1)
CHLORIDE SERPL-SCNC: 103 MMOL/L (ref 94–109)
CO2 SERPL-SCNC: 28 MMOL/L (ref 20–32)
CREAT SERPL-MCNC: 1.07 MG/DL (ref 0.66–1.25)
ERYTHROCYTE [DISTWIDTH] IN BLOOD BY AUTOMATED COUNT: 16.7 % (ref 10–15)
GFR SERPL CREATININE-BSD FRML MDRD: 72 ML/MIN/{1.73_M2}
GLUCOSE BLDC GLUCOMTR-MCNC: 109 MG/DL (ref 70–99)
GLUCOSE BLDC GLUCOMTR-MCNC: 115 MG/DL (ref 70–99)
GLUCOSE BLDC GLUCOMTR-MCNC: 127 MG/DL (ref 70–99)
GLUCOSE BLDC GLUCOMTR-MCNC: 131 MG/DL (ref 70–99)
GLUCOSE BLDC GLUCOMTR-MCNC: 196 MG/DL (ref 70–99)
GLUCOSE SERPL-MCNC: 121 MG/DL (ref 70–99)
HCT VFR BLD AUTO: 27.5 % (ref 40–53)
HGB BLD-MCNC: 8.1 G/DL (ref 13.3–17.7)
HGB BLD-MCNC: 8.4 G/DL (ref 13.3–17.7)
INR PPP: 1.41 (ref 0.86–1.14)
MAGNESIUM SERPL-MCNC: 2.4 MG/DL (ref 1.6–2.3)
MCH RBC QN AUTO: 28.9 PG (ref 26.5–33)
MCHC RBC AUTO-ENTMCNC: 30.5 G/DL (ref 31.5–36.5)
MCV RBC AUTO: 95 FL (ref 78–100)
PLATELET # BLD AUTO: 401 10E9/L (ref 150–450)
POTASSIUM SERPL-SCNC: 4.2 MMOL/L (ref 3.4–5.3)
RBC # BLD AUTO: 2.91 10E12/L (ref 4.4–5.9)
SODIUM SERPL-SCNC: 134 MMOL/L (ref 133–144)
TRANSFUSION STATUS PATIENT QL: NORMAL
WBC # BLD AUTO: 11.2 10E9/L (ref 4–11)

## 2020-03-05 PROCEDURE — 93005 ELECTROCARDIOGRAM TRACING: CPT

## 2020-03-05 PROCEDURE — 25000128 H RX IP 250 OP 636: Performed by: STUDENT IN AN ORGANIZED HEALTH CARE EDUCATION/TRAINING PROGRAM

## 2020-03-05 PROCEDURE — 83735 ASSAY OF MAGNESIUM: CPT | Performed by: PHYSICIAN ASSISTANT

## 2020-03-05 PROCEDURE — 85018 HEMOGLOBIN: CPT | Performed by: STUDENT IN AN ORGANIZED HEALTH CARE EDUCATION/TRAINING PROGRAM

## 2020-03-05 PROCEDURE — 85027 COMPLETE CBC AUTOMATED: CPT | Performed by: THORACIC SURGERY (CARDIOTHORACIC VASCULAR SURGERY)

## 2020-03-05 PROCEDURE — 85027 COMPLETE CBC AUTOMATED: CPT | Performed by: PHYSICIAN ASSISTANT

## 2020-03-05 PROCEDURE — 20000004 ZZH R&B ICU UMMC

## 2020-03-05 PROCEDURE — 25000132 ZZH RX MED GY IP 250 OP 250 PS 637: Mod: GY | Performed by: THORACIC SURGERY (CARDIOTHORACIC VASCULAR SURGERY)

## 2020-03-05 PROCEDURE — 71045 X-RAY EXAM CHEST 1 VIEW: CPT

## 2020-03-05 PROCEDURE — 85610 PROTHROMBIN TIME: CPT | Performed by: PHYSICIAN ASSISTANT

## 2020-03-05 PROCEDURE — 36415 COLL VENOUS BLD VENIPUNCTURE: CPT | Performed by: PHYSICIAN ASSISTANT

## 2020-03-05 PROCEDURE — P9016 RBC LEUKOCYTES REDUCED: HCPCS | Performed by: PHYSICIAN ASSISTANT

## 2020-03-05 PROCEDURE — 99232 SBSQ HOSP IP/OBS MODERATE 35: CPT | Mod: GC | Performed by: ANESTHESIOLOGY

## 2020-03-05 PROCEDURE — 40000275 ZZH STATISTIC RCP TIME EA 10 MIN

## 2020-03-05 PROCEDURE — 25000132 ZZH RX MED GY IP 250 OP 250 PS 637: Mod: GY | Performed by: STUDENT IN AN ORGANIZED HEALTH CARE EDUCATION/TRAINING PROGRAM

## 2020-03-05 PROCEDURE — 80048 BASIC METABOLIC PNL TOTAL CA: CPT | Performed by: PHYSICIAN ASSISTANT

## 2020-03-05 PROCEDURE — 00000146 ZZHCL STATISTIC GLUCOSE BY METER IP

## 2020-03-05 PROCEDURE — 99233 SBSQ HOSP IP/OBS HIGH 50: CPT | Mod: GC | Performed by: INTERNAL MEDICINE

## 2020-03-05 PROCEDURE — 93010 ELECTROCARDIOGRAM REPORT: CPT | Performed by: INTERNAL MEDICINE

## 2020-03-05 PROCEDURE — 97110 THERAPEUTIC EXERCISES: CPT | Mod: GO

## 2020-03-05 PROCEDURE — 97535 SELF CARE MNGMENT TRAINING: CPT | Mod: GO

## 2020-03-05 PROCEDURE — 36415 COLL VENOUS BLD VENIPUNCTURE: CPT | Performed by: STUDENT IN AN ORGANIZED HEALTH CARE EDUCATION/TRAINING PROGRAM

## 2020-03-05 PROCEDURE — 25800030 ZZH RX IP 258 OP 636: Performed by: STUDENT IN AN ORGANIZED HEALTH CARE EDUCATION/TRAINING PROGRAM

## 2020-03-05 PROCEDURE — 40000014 ZZH STATISTIC ARTERIAL MONITORING DAILY

## 2020-03-05 PROCEDURE — 85730 THROMBOPLASTIN TIME PARTIAL: CPT | Performed by: PHYSICIAN ASSISTANT

## 2020-03-05 RX ORDER — SODIUM CHLORIDE, SODIUM LACTATE, POTASSIUM CHLORIDE, CALCIUM CHLORIDE 600; 310; 30; 20 MG/100ML; MG/100ML; MG/100ML; MG/100ML
INJECTION, SOLUTION INTRAVENOUS CONTINUOUS
Status: DISCONTINUED | OUTPATIENT
Start: 2020-03-05 | End: 2020-03-08 | Stop reason: CLARIF

## 2020-03-05 RX ORDER — SODIUM CHLORIDE, SODIUM LACTATE, POTASSIUM CHLORIDE, CALCIUM CHLORIDE 600; 310; 30; 20 MG/100ML; MG/100ML; MG/100ML; MG/100ML
INJECTION, SOLUTION INTRAVENOUS CONTINUOUS
Status: DISCONTINUED | OUTPATIENT
Start: 2020-03-05 | End: 2020-03-05 | Stop reason: HOSPADM

## 2020-03-05 RX ORDER — CEFAZOLIN SODIUM 1 G/3ML
1 INJECTION, POWDER, FOR SOLUTION INTRAMUSCULAR; INTRAVENOUS SEE ADMIN INSTRUCTIONS
Status: DISCONTINUED | OUTPATIENT
Start: 2020-03-05 | End: 2020-03-05 | Stop reason: HOSPADM

## 2020-03-05 RX ORDER — CEFAZOLIN SODIUM 2 G/100ML
2 INJECTION, SOLUTION INTRAVENOUS
Status: DISCONTINUED | OUTPATIENT
Start: 2020-03-05 | End: 2020-03-05 | Stop reason: HOSPADM

## 2020-03-05 RX ADMIN — AMPICILLIN SODIUM 2 G: 2 INJECTION, POWDER, FOR SOLUTION INTRAMUSCULAR; INTRAVENOUS at 14:24

## 2020-03-05 RX ADMIN — DIGOXIN 125 MCG: 125 TABLET ORAL at 07:39

## 2020-03-05 RX ADMIN — MULTIPLE VITAMINS W/ MINERALS TAB 1 TABLET: TAB at 07:38

## 2020-03-05 RX ADMIN — PANTOPRAZOLE SODIUM 40 MG: 40 TABLET, DELAYED RELEASE ORAL at 07:39

## 2020-03-05 RX ADMIN — METHOCARBAMOL 500 MG: 500 TABLET, FILM COATED ORAL at 20:43

## 2020-03-05 RX ADMIN — ACETAMINOPHEN 650 MG: 325 TABLET, FILM COATED ORAL at 20:42

## 2020-03-05 RX ADMIN — AMPICILLIN SODIUM 2 G: 2 INJECTION, POWDER, FOR SOLUTION INTRAMUSCULAR; INTRAVENOUS at 02:51

## 2020-03-05 RX ADMIN — ACETAMINOPHEN 650 MG: 325 TABLET, FILM COATED ORAL at 12:15

## 2020-03-05 RX ADMIN — HYDROMORPHONE HYDROCHLORIDE 2 MG: 2 TABLET ORAL at 12:15

## 2020-03-05 RX ADMIN — Medication 200 MG: at 07:37

## 2020-03-05 RX ADMIN — AMIODARONE HYDROCHLORIDE 400 MG: 200 TABLET ORAL at 07:38

## 2020-03-05 RX ADMIN — INSULIN ASPART 3 UNITS: 100 INJECTION, SOLUTION INTRAVENOUS; SUBCUTANEOUS at 22:16

## 2020-03-05 RX ADMIN — ALLOPURINOL 200 MG: 100 TABLET ORAL at 07:38

## 2020-03-05 RX ADMIN — FLUOXETINE 20 MG: 20 CAPSULE ORAL at 07:38

## 2020-03-05 RX ADMIN — SODIUM CHLORIDE, POTASSIUM CHLORIDE, SODIUM LACTATE AND CALCIUM CHLORIDE: 600; 310; 30; 20 INJECTION, SOLUTION INTRAVENOUS at 12:15

## 2020-03-05 RX ADMIN — AMPICILLIN SODIUM 2 G: 2 INJECTION, POWDER, FOR SOLUTION INTRAMUSCULAR; INTRAVENOUS at 20:42

## 2020-03-05 RX ADMIN — ASPIRIN 81 MG CHEWABLE TABLET 81 MG: 81 TABLET CHEWABLE at 07:38

## 2020-03-05 RX ADMIN — MAGNESIUM OXIDE 400 MG: 400 TABLET ORAL at 07:38

## 2020-03-05 RX ADMIN — DICLOFENAC 4 G: 10 GEL TOPICAL at 20:43

## 2020-03-05 RX ADMIN — ATORVASTATIN CALCIUM 20 MG: 20 TABLET, FILM COATED ORAL at 20:42

## 2020-03-05 RX ADMIN — AMPICILLIN SODIUM 2 G: 2 INJECTION, POWDER, FOR SOLUTION INTRAMUSCULAR; INTRAVENOUS at 07:40

## 2020-03-05 RX ADMIN — ACETAMINOPHEN 650 MG: 325 TABLET, FILM COATED ORAL at 07:38

## 2020-03-05 ASSESSMENT — ACTIVITIES OF DAILY LIVING (ADL)
ADLS_ACUITY_SCORE: 16
ADLS_ACUITY_SCORE: 15

## 2020-03-05 ASSESSMENT — NEW YORK HEART ASSOCIATION (NYHA) CLASSIFICATION: NYHA FUNCTIONAL CLASS: IV

## 2020-03-05 ASSESSMENT — PAIN DESCRIPTION - DESCRIPTORS: DESCRIPTORS: SORE

## 2020-03-05 NOTE — PROGRESS NOTES
"Thoracic Surgery Progress Note    Subjective:  No acute issues overnight. Transfused RBC and plasma Plasma yesterda with response. This am complains of pain and leakage around the CT site.       Vitals:  BP (!) 69/57   Pulse 60   Temp 98.4  F (36.9  C) (Axillary)   Resp 16   Ht 1.702 m (5' 7\")   Wt 96.8 kg (213 lb 6.4 oz)   SpO2 97%   BMI 33.42 kg/m       Gen: Awake, alert, NAD , interactive.   Resp: non-labored on RA  CT: Good seal. No air leak. To suction.   Ext: warm and well perfused, some pitting edema    A/P:Eliseo Tanner is a 64 year old male with a history of DMII, ABHINAV, CKD, HTN, CAD, and CHF s/p LVAD placement on 2/18, now with new spontaneous right hemothorax in setting of INR of 2.9. Had CT placed, decreased output this am with persistent right apical collection that will require surgical drainage:    - preop orders placed and consent obtained for  Right thoracoscopy, wahsout, possible thoracotomy- Right  - continue optimization of INR  - NPO for now with surgery later on today      discussed with staff Dr Kyree Whitten [facility]  General Surgery (PGY-1)  Pager [phone]      "

## 2020-03-05 NOTE — PROGRESS NOTES
"SPIRITUAL HEALTH SERVICES  SPIRITUAL ASSESSMENT Progress Note  [facility] 4E     Visited with pt and spouse. Pt/spouse shared regarding \"the last couple of days\" as being \"really hard, but things seem to be getting better today...\" Pt having a procedure this afternoon - we had blessing and prayer in anticipation of procedure.    PLAN: continue to follow, visiting at least 2x weekly while pt on unit.    Ad Mccoy) Hernandez Krause M.Div., [facility]  Staff   Pager [phone]      "

## 2020-03-05 NOTE — H&P
CV ICU ADMISSION NOTE  3/5/2020      ASSESSMENT:  Mr Eliseo Tanner is a 65yo M w/PMHx notable for chronic systolic heart failure, NICM, moderate CAD, HTN, ABHINAV on CPAP, DM2, CKDIII who is transferred to [facility] for evaluation and management of advanced heart failure with arrhythmia now s/p HM 3 on 2/18. 3/5 had hemothorax requiring a chest tube. Thoracic surgery will operate on him 3/5.     PLAN:  Neuro:   - Neuro intact  - Pain; tylenol and oxycodone  - Depression; fluoxetine     CV:   - Hx of chronic systolic heart failure with EF 15-20%  - Atrial Fibrillation     - EP Consult, cardioversion 2/28. Amio 400 mg PO BID with taper, digoxin 125 mcg.    - HMIII LVAD     - ASA 81 mg, atorvastatin 20 mg.     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 Bivalirudin gtt discontinued stopped 3/2. Coumadin goal INR 2-3.     - INR 2.97, goal 2-3  -hold warfarin today  - HTN; hydralazine 100 q 8 hrs, lisinopril 5 mg  - VT; EP recommended ICD placement prior to discharge. EP working on time  -hemothorax   Chest tube placed, thoracic surgery 3/5       Pulm:   - Hx ABHINAV on CPAP   - Pulm toilet, IS, activity and deep breathing   - Supplemental O2 PRN to keep sats > 92%. Wean off as tolerated.      ID:   - WBC WNL, afebrile, no signs or symptoms of infection   - Bacteremia 2/13 and 2/15; ampicillin 2 grams q 6 hrs (14 days after removal of all central venous catheters (2/26 - 3/11))   - ID recommends surveillance cultures every week x 4 weeks after stopping all antibiotics (note on 2/25/20). Repeat cultures for surveillance drawn 3/3, NGTD.      GI / FEN:  - Reg diet, bowel regimen  - Hx GERD.      Renal / :   - CKD III; creatinine 1.23, adequate UOP.   - lasix 40 IV. Will discuss dosing with Cardiology.      Heme:   - Hgb now 8.1 from 6.7 yesterday. 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      Endo:   - Hx Gout; allopurinol 200 mg daily   - T2DM; sliding scale insulin and lantus 10 units (change lantus 10 units to q hs)       Disposition:  -ok to go to  the floor following thoracic surgery       - - - - - - - - - - - - - - - - - - - - - - - - - - - - - - - - - - - - - - - - - - - - - - - - - - - - - - - - - - - - - - - - - - - - - - - -     PRIMARY TEAM: CVTS  PRIMARY PHYSICIAN: Dr. Horne    REASON FOR CRITICAL CARE ADMISSION: Hemothorax    ADMITTING PHYSICIAN: Dr. Mckinnon    HISTORY PRESENTING ILLNESS:Mr Eliseo Tanner is a 65yo M w/PMHx notable for chronic systolic heart failure, NICM, moderate CAD, HTN, ABHINAV on CPAP, DM2, CKDIII who is transferred to [facility] for evaluation and management of advanced heart failure with arrhythmia now s/p HM 3 on 2/18. Hit was positive and was placed on bival to warfarin.  3/5 had hemothorax requiring a chest tube. Thoracic surgery will operate on him 3/5.     REVIEW OF SYSTEMS: As noted above. No headache, dizziness. No fever, chills. No chest pain or shortness of breath. No abdominal pain, nausea, vomiting. No diarrhea or constipation. No urinary difficulties. No muscle or body aches.     PAST MEDICAL HISTORY:   No past medical history on file.    SURGICAL HISTORY:   Past Surgical History:   Procedure Laterality Date     ANESTHESIA CARDIOVERSION N/A 2/28/2020    Procedure: ANESTHESIA, FOR CARDIOVERSION;  Surgeon: GENERIC ANESTHESIA PROVIDER;  Location: [facility] OR     CV CENTRAL VENOUS CATHETER PLACEMENT N/A 2/13/2020    Procedure: Central Venous Catheter Placement;  Surgeon: Chente Moss MD;  Location:  HEART CARDIAC CATH LAB     CV INTRA-AORTIC BALLOON PUMP INSERTION N/A 2/7/2020    Procedure: Intra-Aortic Balloon Pump Insertion;  Surgeon: Jose Baldwin MD;  Location:  HEART CARDIAC CATH LAB     CV INTRA-AORTIC BALLOON PUMP INSERTION N/A 2/13/2020    Procedure: Intra-Aortic Balloon Pump Insertion;  Surgeon: Chente Moss MD;  Location: [facility] CARDIAC CATH LAB     CV SWAN LUCIANA PROCEDURE N/A 2/13/2020    Procedure: [place] Luc[place] Procedure;  Surgeon: Chente Moss MD;  Location:   HEART CARDIAC CATH LAB     INSERT VENTRICULAR ASSIST DEVICE LEFT (HEARTMATE II) N/A 2/18/2020    Procedure: INSERTION, LEFT VENTRICULAR ASSIST DEVICE (HEARTMATE III);  Surgeon: Mac Jaramillo MD;  Location:  OR       SOCIAL HISTORY: Tobacco - none. Etoh - none.      FAMILY HISTORY: No bleeding/clotting disorders nor problems with anesthesia.     ALLERGIES:      Allergies   Allergen Reactions     Heparin      HIT screen positive 2/14/20, reflex DAVINA negative; however heme recommended treating as if positive  HIT screen negative 2/11/20     Oxycodone Itching and Other (See Comments)       MEDICATIONS:  No current facility-administered medications on file prior to encounter.   acetaminophen (TYLENOL) 500 MG tablet, Take 1,000 mg by mouth daily as needed  albuterol (PROAIR HFA/PROVENTIL HFA/VENTOLIN HFA) 108 (90 Base) MCG/ACT inhaler, Inhale 2 puffs into the lungs every 4 hours as needed  allopurinol (ZYLOPRIM) 100 MG tablet, Take 200 mg by mouth daily  aspirin 81 MG EC tablet, Take 81 mg by mouth daily  atorvastatin (LIPITOR) 20 MG tablet, Take 20 mg by mouth daily  coenzyme Q-10 200 MG CAPS, Take 1 capsule by mouth daily  FLUoxetine (PROZAC) 20 MG capsule, Take 20 mg by mouth daily  furosemide (LASIX) 40 MG tablet, Take 40 mg by mouth daily  glipiZIDE (GLUCOTROL XL) 2.5 MG 24 hr tablet, Take 2.5 mg by mouth daily  insulin glargine (BASAGLAR KWIKPEN) 100 UNIT/ML pen, Inject 15 Units Subcutaneous At Bedtime  insulin pen needle (BD JAIME U/F) 32G X 4 MM miscellaneous,   magnesium oxide (MAG-OX) 400 MG tablet, Take 400 mg by mouth daily  MULTIPLE MINERALS-VITAMINS PO, Take 1 tablet by mouth daily  omeprazole (PRILOSEC) 20 MG DR capsule, Take 20 mg by mouth every morning  ondansetron (ZOFRAN) 4 MG tablet, Take 4 mg by mouth every 4 hours as needed for nausea  sacubitril-valsartan (ENTRESTO)  MG per tablet, Take 1 tablet by mouth 2 times daily  spironolactone (ALDACTONE) 25 MG tablet, Take 25 mg by mouth  daily        PHYSICAL EXAMINATION:  Temp:  [97.5  F (36.4  C)-98.8  F (37.1  C)] 97.9  F (36.6  C)  Pulse:  [] 60  Heart Rate:  [] 89  Resp:  [16-24] 16  BP: ()/(41-97) 123/93  SpO2:  [89 %-100 %] 99 %  General: Alert, well-appearing in no acute distress.  HEENT: Normocephalic, atraumatic.  Chest wall: Symmetric thorax. No masses. Chest site c/d/i  Respiratory: Non-labored breathing. Lung sounds clear to auscultation bilaterally. Chest tube in place   Cardiovascular: Regular rate and rhythm.   Gastrointestinal: Abdomen soft, non-distended, non-tender to palpation.   Extremities: Moving all four extremities. No limb deformities. No pedal edema.   Skin: As noted above. No rashes or lesions appreciated.    LABS: Reviewed.   Arterial Blood Gases   No lab results found in last 7 days.  Complete Blood Count   Recent Labs   Lab 03/05/20  1229 03/05/20 0434 03/04/20 2222 03/04/20  1552 03/04/20  0602 03/03/20  0612   WBC  --  11.2* 11.8*  --   --  11.3* 9.7   HGB 8.1* 8.4* 8.8* 6.7*   < > 8.2* 9.4*   PLT  --  401 369  --   --  448 476*    < > = values in this interval not displayed.     Basic Metabolic Panel  Recent Labs   Lab 03/05/20  0434 03/04/20 0757 03/04/20  0602 03/03/20  0612 03/02/20  0619     --  137 136 136   POTASSIUM 4.2 4.0 3.9 3.8 3.8   CHLORIDE 103  --  104 102 102   CO2 28  --  26 27 29   BUN 17  --  20 24 24   CR 1.07  --  1.23 1.39* 1.11   *  --  145* 132* 130*     Liver Function Tests  Recent Labs   Lab 03/05/20  0434 03/04/20  1552 03/04/20  0757 03/04/20  0602   AST  --   --  20  --    ALT  --   --  21  --    ALKPHOS  --   --  106  --    BILITOTAL  --   --  0.3  --    ALBUMIN  --   --  1.8*  --    INR 1.41* 1.84* 2.97* 2.88*     Pancreatic Enzymes  No lab results found in last 7 days.  Coagulation Profile  Recent Labs   Lab 03/05/20  0434 03/04/20  1552 03/04/20  0757 03/04/20  0602 03/03/20  0612 03/02/20  0619   INR 1.41* 1.84* 2.97* 2.88* 2.64* 2.66*   PTT 43*  --    --  62* 62* 58*     Lactate  Invalid input(s): LACTATE    IMAGING:  Results for orders placed or performed during the hospital encounter of 02/06/20   XR Chest Port 1 View    Narrative    EXAM: XR CHEST PORT 1 VW  LOCATION: [facility]  DATE/TIME: 2/6/2020 10:51 PM    INDICATION: Central line placement  COMPARISON: None.      Impression    IMPRESSION: Right-sided PICC line tip overlies mid superior vena cava. Right internal jugular central line tip overlies lower superior vena cava. Right pleural reaction. Patchy atelectasis right base. Left lung clear. Heart size upper limits of normal.   XR Chest Port 1 View    Narrative    XR CHEST PORT 1 VW  2/7/2020 4:21 PM      HISTORY: SG Placement    COMPARISON: None available    FINDINGS: Single AP chest radiograph. [place]-Chucky catheter tip is in the  proximal right main pulmonary artery. Right central line tip and right  upper extremity PICC line tip at the lower SVC. Trachea is midline,  cardiomegaly. Bilateral perihilar streaky opacities. Mild increased  interstitial opacities in the right lung with ill-defined pulmonary  vascularity. No pneumothorax or pleural effusion. No acute osseous or  abdominal abnormality. Elevated left hemidiaphragm.      Impression    IMPRESSION:  1. [place]-Chucky catheter tip at the proximal right main pulmonary artery.  2. Cardiomegaly with mild pulmonary edema, especially on the right.    I have personally reviewed the examination and initial interpretation  and I agree with the findings.    DONG SOLORIO MD   US abdomen complete    Narrative    EXAMINATION: US ABDOMEN COMPLETE,  2/8/2020 9:48 AM     COMPARISON: Same-day CT    HISTORY: LVAD workup, plus end-stage renal disease    TECHNIQUE: The abdomen was scanned in standard fashion with  specialized ultrasound transducer(s) using both gray-scale and limited  color Doppler techniques.    FINDINGS:    Liver: The liver demonstrates normal homogeneous echotexture. No  evidence  of a focal hepatic mass. The main portal vein is patent with  antegrade flow, measuring 23 cm/s.    Gallbladder:  There is no wall thickening, pericholecystic fluid,  positive sonographic Yoon's sign or evidence for cholelithiasis.    Bile Ducts: Both the intra- and extrahepatic biliary system are of  normal caliber.  The common bile duct measures 4.1 mm in diameter.    Pancreas: Visualized portions of the head and body of the pancreas are  unremarkable.     Kidneys: Both kidneys are of normal echotexture, without mass or  hydronephrosis.   The craniocaudal dimensions are: right- 11.1 cm,  left- 10.8 cm.    Spleen: The spleen is normal in size,  measuring 10.4 cm in sagittal  dimension.    Aorta and IVC: Aorta obscured by intra-aortic balloon pump. The IVC  measures 3.0 cm in diameter.    Fluid: Mild intra-abdominal ascites and bilateral pleural effusions,  right greater than left. Findings appear similar to same day CT.      Impression    IMPRESSION: Mild intra-abdominal ascites and bilateral pleural  effusions, right greater than left. Otherwise unremarkable abdominal  ultrasound.    I have personally reviewed the examination and initial interpretation  and I agree with the findings.    LUPE MASON MD   US SENTHIL Doppler No Exercise    Narrative    Exam: Bilateral lower extremity resting ankle brachial indices dated  2/10/2020 3:46 PM    Comparison study: None    Clinical history: LVAD workup    Ordering provider: Alondra    Technique: Bilateral lower extremity resting ankle brachial indices  obtained.     Findings:    Right:      Arm: 149 mmHg   PT at ankle: 115 mmHg   DP at foot: 113 mmHg     SENTHIL: 0.77    Left:     Arm: 138 mmHg   PT at ankle: 116 mmHg   DP at foot: 123 mmHg      SENTHIL: 0.83       Impression    Impression:     Right leg: Resting SENTHIL is 0.77. Mild to moderately abnormal,  0.41-0.90.    Left leg: Resting SENTHIL is 0.83. Mild to moderately abnormal, 0.41-0.90.    Guidelines:    SENTHIL Diagnostic  Criteria (Based on criteria published in Circulation  2011; 124: 4905-9104):    > 1.4: Non compressible    1.00 - 1.40: Normal    0.91 - 0.99: Borderline    At or below 0.90: Abnormal    SENTHIL Diagnostic Criteria (Based on ACC/AHA guideline 2008):    >/=1.3 - non compressible vessels    1.00  -1.29 - Normal    0.91 - 0.99 - Borderline    0.41 - 0.90 - Mild to moderate PAD    0.00 - 0.40 - Severe PAD    I have personally reviewed the examination and initial interpretation  and I agree with the findings.    SINCERE HOOD   US Lower Extremity Arterial Duplex Bilateral    Narrative    Exam: Duplex ultrasound of bilateral lower extremity arteries dated  2/8/2020 9:48 AM     Clinical information: LVAD Workup     Comparison: None available    Technique: Grayscale (B-mode), color Doppler, and duplex spectral  Doppler ultrasound of the lower extremity arteries. Velocity  measurements obtained with angle correction of 60 degrees or less.    Ordering provider: REFERRED SELF    Findings:     Right lower extremity:     Right common femoral artery and profunda femoral artery origin are  obscured by intra-aortic balloon pump.    EIA: Velocity: 131 cm/s. Waveforms: Triphasic.  Deep femoral artery: obscured by balloon pump    Origin SFA: obscured by balloon pump  Proximal SFA: Velocity: 75 cm/sec. Waveforms: Biphasic  Mid SFA: Velocity:  78 cm/sec. Waveforms: Biphasic  Distal SFA: Velocity: 84 cm/sec. Waveforms: Biphasic    Popliteal artery: Velocity: 58 cm/sec. Waveforms: Biphasic    PTA ankle: Velocity: 43 cm/sec. Waveforms: Biphasic  OTTO ankle: Velocity: 20 cm/sec. Waveforms: Biphasic    Left lower extremity:    Common femoral artery: Velocity: 109 cm/sec. Waveforms: Biphasic  Deep femoral artery: Velocity: 104 cm/sec. Waveforms: Biphasic  Proximal SFA: Velocity: 99 cm/sec. Waveforms: Biphasic  Mid SFA: Velocity: 80 cm/sec. Waveforms: Biphasic  Distal SFA: Velocity: 86 cm/sec. Waveforms: Biphasic    Popliteal artery: Velocity:  67 cm/sec. Waveforms: Biphasic    PTA ankle: Velocity: 48 cm/sec. Waveforms: Biphasic  OTTO ankle: Velocity: 52 cm/sec. Waveforms: Biphasic      Impression    IMPRESSION: The right common femoral artery and profunda femoral  artery origin are obscured by the intra-aortic balloon pump. Patent  Doppler assessment of the bilateral lower extremity arterial system,  without evidence for hemodynamically significant stenosis in either  extremity.    Guidelines:    [facility] duplex criteria for lower limb arterial  occlusive disease    Percent stenosis:     Normal (1-19%): Peak systolic velocity (cm/s): <150, End-diastolic  velocity (cm/s): <40, Velocity ratio (Vr): <1.5, Distal arterial  waveform: Triphasic    20-49%: Peak systolic velocity (cm/s): 150-200, End-diastolic velocity  (cm/s): <40, Velocity ratio (Vr): 1.5-2.0, Distal arterial waveform:  Triphasic    50-75%: Peak systolic velocity (cm/s): 200-300, End-diastolic velocity  (cm/s): <90, Velocity ratio (Vr): 2.0-3.9, Distal arterial waveform:  Poststenotic turbulence distal to stenosis, monophasic distal waveform    >75%: Peak systolic velocity (cm/s): >300, End-diastolic velocity  (cm/s): <90, Velocity ratio (Vr): >4.0, Distal arterial waveform:  Dampened distal waveform and low PSV/EDV* in the stenosis    Occlusion: Absent flow by color Doppler/pulsed Doppler spectral  analysis; length of occlusion estimated from distance between exit and  reentry collateral arteries    *PSV = peak systolic velocity, EDV = end-diastolic velocity  http://link.lugo.com/chapter/10.1007/[email or web address]/fulltext  html    I have personally reviewed the examination and initial interpretation  and I agree with the findings.    MITA LOPEZ MD   CT Head w/o contrast*    Narrative    CT HEAD W/O CONTRAST 2/8/2020 8:43 AM    Provided History: LVAD Workup    Comparison: None available.    Technique: Using multidetector thin collimation helical acquisition  technique,  axial, coronal and sagittal CT images from the skull base  to the vertex were obtained without intravenous contrast.     Findings:    No intracranial hemorrhage, mass effect, or midline shift. The  ventricles are proportionate to the cerebral sulci. The gray to white  matter differentiation of the cerebral hemispheres is preserved. The  basal cisterns are patent. Mild basal ganglia mineralization.    The visualized paranasal sinuses are clear. The mastoid air cells are  clear.       Impression    Impression: No acute intracranial pathology.    I have personally reviewed the examination and initial interpretation  and I agree with the findings.    YADI MIR MD   CT Chest Abdomen Pelvis w/o Contrast    Narrative    EXAMINATION: CT CHEST ABDOMEN PELVIS W/O CONTRAST, 2/8/2020 8:43 AM    TECHNIQUE:  Helical CT images from the thoracic inlet through the  symphysis pubis were obtained  without IV contrast.     COMPARISON: Radiograph 2/7/2020    HISTORY: LVAD Workup    FINDINGS:    Support devices: Left IJ [place]-Chucky catheter tip projects over the  proximal to mid right pulmonary artery. Right IJ central venous  catheter tip in the right atrium. Right upper extremity PICC tip in  the right atrium. Right femoral artery approach aortic balloon pump,  with tip at the level of the lucio, within inferior aspect of the  lumen just below the origins of the renal arteries. Guevara catheter,  with balloon in the urinary bladder.    Chest:     Visualized portions of the thyroid gland are unremarkable in  appearance. Stable enlargement of the heart. Moderate coronary artery  calcifications. The thoracic vessels appear normal in caliber and  configuration on this noncontrast examination. No suspicious thoracic  adenopathy. Trace hiatal hernia. Mildly patulous distal esophagus.    The central tracheobronchial tree is patent. No pneumothorax. Moderate  right and small left pleural effusions, with overlying  bibasilar  atelectasis/consolidation. Mild tree-in-bud nodularity within the  lateral aspect of the right upper lobe, otherwise the aerated portions  of the lungs are relatively clear.    Abdomen and pelvis:     Unremarkable noncontrast appearance of the liver, gallbladder, spleen,  and adrenal glands. Moderate fatty replacement of the pancreatic  parenchyma. The pancreas is otherwise unremarkable in appearance. The  kidneys are normal in size and position, without hydronephrosis,  nephrolithiasis, or focal renal lesion. The urinary bladder is mildly  distended and otherwise unremarkable in appearance. Symmetric seminal  vesicles. Unremarkable prostate.    No abnormally dilated or thickened loops of small or large bowel. No  pneumatosis or portal venous gas. The appendix is not visualized,  however there is no significant inflammatory change in the right lower  quadrant to suggest acute appendicitis. Trace intra-abdominal ascites.  Mild retroperitoneal and presacral edema. No intraperitoneal free air.  Intra-aortic balloon pump as above. No evidence of infrarenal  abdominal aortic aneurysm. No overtly suspicious lymphadenopathy  within the abdomen or pelvis.    Bones and soft tissues: No acute or suspicious osseous lesion.  Degenerative changes of the spine, most significantly in the lower  lumbar spine. Lower lumbar predominant facet arthropathy. Bilateral  pars interarticularis defects at L3-4, with grade 1 anterolisthesis of  L3 on L4. Grade 1 anterolisthesis of L4 on L5. The spinal column is  otherwise normal alignment. Bony wall edema. Fat-containing left  inguinal hernia.      Impression    IMPRESSION:   1. Moderate right and small left pleural effusions, with overlying  atelectasis/consolidation.  2. Mild tree-in-bud nodularity within the lateral right upper lobe,  concerning for infection. Findings may represent respiratory  bronchiolitis.  3. No acute pathology within the abdomen or pelvis.  Mild  intra-abdominal ascites and other signs of third spacing of fluid.  4. The inferior aspect of the intra-aortic balloon pump extends below  the level of the renal artery origins.     I have personally reviewed the examination and initial interpretation  and I agree with the findings.    LUPE MASON MD   XR Chest Port 1 View    Narrative    EXAMINATION: XR chest port 1 view, 2/9/2020 7:09 AM    INDICATION: Daily chest radiograph for [place] placement.    COMPARISON: CT chest 2/8/2020, chest radiograph 2/7/2020    FINDINGS: Single AP radiograph of the chest    Trachea is midline. Enlarged cardiac silhouette. Cardiac and  mediastinal borders are clear. Left IJ [place]-Chucky catheter with tip  projecting in the main right pulmonary artery. IABP just inferior to  the level of the lucio. Please see separate CT chest. Mild left  hemidiaphragm elevation. Bibasilar streaky opacities. Mild perihilar  hazy opacities. Small bilateral pleural effusions. No pneumothorax.       Impression    IMPRESSION:  1. Left IJ [place]-Chucky catheter with tip located in the main right  pulmonary artery.  2. Cardiomegaly with mild pulmonary edema. IABP tip just inferior to  the level of the lucio.    I have personally reviewed the examination and initial interpretation  and I agree with the findings.    LUPE MASON MD   XR Chest Port 1 View    Narrative    Exam: AP chest radiograph 2/10/2020 12:49 AM.    HISTORY: Daily chest x-ray for [place] placement.    COMPARISON: 2/9/2020, CT CAP 2/8/2020.    FINDINGS: AP chest radiograph. Left costophrenic angle is collimated  out of the exam. IABP superior marker projects at the level of the  lucio. [place]-Chucky catheter tip projects over the proximal right main  pulmonary artery. Trachea is midline, stable cardiomegaly. Stable to  slightly improved right pleural effusion. Bilateral perihilar streaky  interstitial opacities. No pneumothorax. No acute osseous or abdominal  abnormality.       Impression    IMPRESSION:  1. Stable slightly improved right pleural effusion with overlying  atelectasis/consolidation.  2. Cardiomegaly with mild pulmonary edema/developing infection.  3. [place]-Chucky catheter tip in the proximal right main pulmonary artery.    I have personally reviewed the examination and initial interpretation  and I agree with the findings.    TIERRA MILLER MD   XR Wrist Port Left G/E 3 Views    Narrative    3 views left wrist radiographs 2/10/2020 10:48 AM    History: left wrist pain     Comparison: None    Findings:    PA, oblique and lateral view(s) of the left wrist were obtained.     No acute osseous abnormality.  Apparent widening of the scapholunate  interval measuring approximate 4 mm. No erosion.    Severe degenerative changes of the distal radioulnar joint with  abnormal steep angle between the distal radius and ulna. Joint space  narrowing and sclerosis of the radiocarpal joint. Severe degenerative  changes of the first carpometacarpal and triscaphe joints. Bony  perforation along the dorsal aspect of the distal ulna. The bones  appear osteopenic.    Soft tissue is unremarkable.      Impression    Impression:  1. No acute osseous abnormality.  2. Severe degenerative changes of the wrist.  3. Apparent widening of the scapholunate interval measuring 4 mm.    I have personally reviewed the examination and initial interpretation  and I agree with the findings.    JUTTA ELLERMANN, MD   CT Dental wo Contrast    Narrative    CT DENTAL WO CONTRAST 2/10/2020 6:17 PM    History:  work-up for LVAD    Comparison:  None      TECHNIQUE: 3-D reconstruction by the technologists, with curved  multiplanar reformat of thin section imaging through the mandible and  maxilla obtained without intravenous contrast.    FINDINGS:  No significant soft tissue swelling or mass. Normal facial bone  alignment. No bony erosion. No appreciable periapical abscesses.  Dental amalgam artifact limits  visualization.    Visualized portions of the paranasal sinuses are clear. Normal  temporomandibular joints.      Impression    IMPRESSION: No periapical abscess.    I have personally reviewed the examination and initial interpretation  and I agree with the findings.    KATHY SEAMAN MD   XR Chest Port 1 View    Narrative    Exam: AP chest radiograph 2/11/2020 6:09 AM.    HISTORY: Daily chest x-ray for [place] placement.    COMPARISON: 2/10/2020, 10/9/2020.    FINDINGS: AP chest radiograph. [place]-Chucky catheter tip projects over  the main pulmonary artery. IABP superior marker projects approximately  1.5 cm below the lucio. Trachea is midline, stable cardiomegaly.  Right pulmonary predominate streaky perihilar opacities. No  pneumothorax. Probable right-sided pleural effusion in the recumbent  portion of the chest. No acute osseous or abdominal abnormality.      Impression    IMPRESSION:  1. [place]-Chucky catheter tip is projecting over the main pulmonary  artery.  2. IABP superior marker projects approximately 1.5 cm below the  lucio.  3. Cardiomegaly with pulmonary edema, right greater than left.  4. Probable right-sided pleural effusion in the recumbent portion of  the chest.    I have personally reviewed the examination and initial interpretation  and I agree with the findings.    JORGITO KELLY MD   US Upper Ext Arterial Duplex Limited Bilat    Narrative    Duplex Doppler ultrasound assessment of the upper extremities arteries  bilaterally 2/11/2020 3:09 PM    Clinical information: Assess for arterial occlusive disease. Patient  with heart failure. Pre-LVAD study.    Ordering provider:    Technique: B-mode (grayscale) and duplex Doppler ultrasound of the  upper extremity arteries. Velocity measurements obtained with angle  correction at or less than 60 degrees.    Findings:     Peak systolic velocities:    Right Upper Extremity Arteries:     Subclavian proximal: 122 cm/sec, 9.1 mm  Subclavian mid: 87 cm/sec, 9.4  mm    Axillary artery: 99 cm/sec, 9.4 mm    Left Upper Extremity Arteries:    Subclavian proximal: 102 cm/sec, 9.5 mm  Subclavian mid: 103 cm/sec, 8.1 mm    Axillary artery: 90 cm/sec, 6.8 mm    All waveforms biphasic to triphasic      Impression    Impression: Patent upper extremity arterial evaluation with  measurements as above.    I have personally reviewed the examination and initial interpretation  and I agree with the findings.    JAXON LUBIN MD   XR Chest Port 1 View    Narrative    Exam: AP chest radiograph 2/12/2020 1:38 AM.    HISTORY: Daily chest x-ray for [place] placement.    COMPARISON: 2/11/2020, 10/20/2020.    FINDINGS: Single AP chest radiograph. Right costophrenic angle is  collimated out of the exam. [place]-Chucky catheter tip projects over the  main pulmonary artery. IABP superior marker projects approximately  chest below the lucio. Jugular line. Heart size is enlarged. Stable  mild pulmonary edema. Probable layering right pleural effusion. No  pneumothorax.      Impression    IMPRESSION:  1. [place]-Chucky catheter tip projects over the main pulmonary artery.  2. IABP superior marker projects just below the lucio.  3. Cardiomegaly with mild pulmonary edema.  4. Probable layering right pleural effusion.    I have personally reviewed the examination and initial interpretation  and I agree with the findings.    CHARLES HERNANDEZ DO   XR Chest Port 1 View    Narrative    XR CHEST PORT 1 VW  2/13/2020 1:32 AM      HISTORY: daily CXR for swan placement    COMPARISON: 2/12/2020, 2/11/2020    FINDINGS: AP chest radiograph. IABP superior marker projects at the  level of the lucio. Left jugular [place]-Chucky catheter tip projects over  the main pulmonary artery. Trachea is midline, heart size is stable.  Stable layering right pleural effusion. Bilateral perihilar  interstitial opacities. No acute osseous or abdominal abnormality.      Impression    IMPRESSION:  1. Stable cardiomegaly with mild pulmonary  edema.  2. Stable layering pleural effusion on the right.    I have personally reviewed the examination and initial interpretation  and I agree with the findings.    KRYSTEN SOLIZ MD   XR Chest Port 1 View    Narrative    EXAMINATION: XR CHEST PORT 1 VW, 2/13/2020 9:03 PM    INDICATION: Confirm line placement    COMPARISON: Chest radiograph 2/13/2020    FINDINGS: Single AP supine radiograph of chest.     Trachea is midline. Stable enlarged cardiac silhouette. Cardiac and  mediastinal borders are clear. Bibasilar, right greater the left  streaky opacity. Right IJ [place]-Chucky catheter with tip projecting  likely in the right pulmonary artery. Left IJ central venous catheter  with tip projecting at the superior cavoatrial junction.  Intra-arterial balloon pump noted at the aortic arch. No acute osseous  abnormalities. Soft tissues are grossly unremarkable.       Impression    IMPRESSION:  1. Right IJ [place]-Chucky catheter with tip projecting likely in the right  pulmonary artery.  2. Left IJ central venous catheter sheath projecting at the superior  cavoatrial junction.  3. Stable cardiomegaly with mild pulmonary edema.  4. Bibasilar streaky opacities, likely representing atelectasis.    I have personally reviewed the examination and initial interpretation  and I agree with the findings.    ANTHONY HUERTA MD   XR Chest Port 1 View    Narrative    Exam: AP chest radiograph 2/14/2020 5:58 AM.    HISTORY: Daily chest x-ray for [place] placement.    COMPARISON: 2/13/2020, 2/12/2020.    FINDINGS: AP chest radiograph. [place]-Chucky catheter tip projects over  the proximal right main pulmonary artery. IABP superior marker  projects at the level of the lucio. Trachea is midline, cardiac  silhouette is enlarged. Stable bilateral perihilar streaky opacities.      Impression    IMPRESSION:  1. [place]-Chucky catheter tip projects in the proximal right main  pulmonary artery.  2. Cardiomegaly with stable pulmonary edema.    I have personally  reviewed the examination and initial interpretation  and I agree with the findings.    ANTHONY HUERTA MD   XR Chest Port 1 View    Narrative    Exam: AP chest radiograph 2/15/2020 4:27 AM.    HISTORY: Daily chest x-ray for [place] placement    COMPARISON: 2/14/2020, 2/13/2020.    FINDINGS: AP chest radiograph. [place]-Chucky catheter tip projects at the  distal right main pulmonary artery. Stable left internal jugular  central venous catheter. Trachea is midline, cardiac silhouette is  enlarged. Bilateral perihilar streaky opacities and right perihilar  hazy opacity. No pneumothorax or pleural effusion. No acute osseous or  upper abdominal abnormality.      Impression    IMPRESSION:  1. Cardiomegaly with findings of pulmonary edema.  2. Hazy right perihilar opacity, atelectasis versus developing  consolidation.  3. Right internal jugular [place]-Chucky catheter tip at the distal right  main pulmonary artery.    I have personally reviewed the examination and initial interpretation  and I agree with the findings.    CHARLES HERNANDEZ DO   XR Chest Port 1 View    Narrative    Exam: AP chest radiograph 2/16/2020 1:40 AM.    HISTORY: Daily chest x-ray for swan placement.    HISTORY: 2/15/2020, 2/14/2020, 2/13/2020.    FINDINGS: AP chest radiograph. [place]-Chucky catheter tip projects at the  right main pulmonary artery. Stable left jugular central venous  catheter. IABP superior marker at approximately at the level of the  lucio. Trachea is midline, enlarged cardiac silhouette. Stable  bilateral perihilar opacities and interstitial prominence. No  pneumothorax. Trace right pleural effusion.      Impression    IMPRESSION:  1. [place]-Chucky catheter tip at the right main pulmonary artery.  2. Stable enlarged cardiac silhouette with mild pulmonary edema.    I have personally reviewed the examination and initial interpretation  and I agree with the findings.    CHARLES HERNANDEZ,    XR Chest Port 1 View    Narrative    Exam: XR CHEST PORT 1 VW,  2/17/2020 1:45 AM    Indication: daily CXR for swan placement    Comparison: 2/16/2020    Findings:   Single portable AP view the chest. [place]-Chucky catheter tip at the right  main pulmonary artery. IABP at the level of the lucio. Stable low  lung volumes and enlarged cardiac silhouette. Slightly increased  layering right-sided pleural effusion. No subcutaneous left pleural  effusion. Diffuse reticular opacities, right-sided predominant. No  pneumothorax.      Impression    Impression:   1. [place]-Chucky catheter tip projects at the right main pulmonary artery.  2. Low lung volumes with increased small layering right-sided pleural  effusion and interstitial pulmonary edema.    I have personally reviewed the examination and initial interpretation  and I agree with the findings.    TIERRA MILLER MD   XR Chest Port 1 View    Narrative    EXAMINATION:  XR CHEST PORT 1 VW 2/17/2020 11:11 AM.    COMPARISON: 2/17/2020 at 0138 hours.    HISTORY:  SG catheter placement    FINDINGS: AP radiograph of the chest at 20 degrees. Right internal  jugular approach [place]-Chucky catheter tip projects over the right  pulmonary artery. Left internal jugular central venous catheter tip  projects over the superior cavoatrial junction. Unchanged position of  intra-aortic balloon pump marker. Trachea is midline.  Cardiomediastinal silhouette is within normal limits. No large pleural  effusion. No pneumothorax. Diffuse bilateral pulmonary vascular  congestion, improved from prior exam.      Impression    IMPRESSION:   1. [place]-Chucky catheter tip projects over the main pulmonary artery.  Remaining support devices are unchanged in position.  2. Stable to mildly improved pulmonary vascular congestion.    I have personally reviewed the examination and initial interpretation  and I agree with the findings.    QUIQUE NICHOLS MD   XR Chest Port 1 View    Narrative    Exam: XR CHEST PORT 1 VW, 2/18/2020 2:16 AM    Indication: daily CXR for swan  placement    Comparison: 2/17/2020    Findings:   Single portable AP view of the chest. [place]-Chucky catheter tip at the  main pulmonary artery. Left IJ CVC with tip at the superior cavoatrial  junction. IABP marker at the level of the lucio. Stable low lung  volumes and mildly enlarged cardiac silhouette. Increased hazy  opacification of the right lung likely from layering pleural effusion.  Left lung is relatively clear. No pneumothorax..      Impression    Impression:   1. [place]-Chucky catheter tip projects at the main pulmonary artery.  Additional support devices are stable.  2. Small layering right pleural effusion.  3. Stable low lung volumes.    I have personally reviewed the examination and initial interpretation  and I agree with the findings.    CHARLES HERNANDEZ, DO   XR Chest Port 1 View    Narrative    EXAMINATION: XR CHEST PORT 1 , 2/18/2020 9:26 PM    COMPARISON: Earlier same-day    HISTORY: Missing Forceps    FINDINGS: LVAD, bilateral chest tubes in place. Scope in the  midesophagus. Endotracheal tube tip in midthoracic trachea. Right IJ  central venous catheter tip in the right main pulmonary artery. Left  IJ catheter place. Atelectasis in the enlarged cardiac silhouette. No  radiopaque forceps or other abnormal foreign bodies identified.      Impression    IMPRESSION: No retained instrument identified.     KRYSTEN SOLIZ MD   XR Chest Port 1 View    Narrative    Exam: XR CHEST PORT 1 , 2/19/2020 1:05 AM    Indication: s/p LVAD placement    Comparison: 2/18/2020    Findings:   ET tube tip in the midthoracic trachea. [place]-Chucky catheter tip  projects over the right pulmonary artery. Left IJ CVC with tip at the  superior cavoatrial junction. Bilateral chest tubes and mediastinal  drains are stable. Enteric tube tip projects over the stomach. Intact  midline sternotomy wires. LVAD device is present. Stable low lung  volumes and enlarged cardiac silhouette. Stable diffuse interstitial  opacities and  small layering pleural effusions. No pneumothorax.      Impression    Impression:   1. Stable low lung volumes and diffuse interstitial opacities, likely  interstitial pulmonary edema.  2. Stable small bilateral pleural effusions.  3. Multiple support devices are noted.    I have personally reviewed the examination and initial interpretation  and I agree with the findings.    JEREMIE JOE MD   XR Chest Port 1 View    Narrative    Exam: XR CHEST PORT 1 VW, 2/19/2020 2:38 PM    Indication: eval for undrained blood    Comparison: Earlier today    Findings:   Endotracheal tube in the mid thoracic trachea. [place]-Chucky catheter tip  in the right pulmonary artery. Enteric tube seen coursing through the  mediastinum. Tip is at least to the stomach but projects off the film.  Mediastinal chest tubes and bibasilar chest tubes are unchanged. Left  ventricular assist device unchanged.    Low lung volumes. No significant pulmonary edema. Left midlung  atelectasis is stable. Likely left lower lobe atelectasis. Heart  within normal limits.      Impression    Impression:   1. Stable support devices  2. Decrease pulmonary edema.  3. Continued left lower lobe atelectasis.    DEVON CHARLES MD   XR Chest Port 1 View    Narrative    Exam: XR CHEST PORT 1 VW, 2/20/2020 2:00 AM    Indication: daily CXR for swan placement    Comparison: 2/19/2020    Findings:   Single portable AP view of the chest. [place]-Chucky catheter tip projects  over the proximal right main pulmonary artery. Left IJ CVC with tip at  the low SVC. Bilateral chest tubes and mediastinal drains. LVAD is  partially visualized. Midline sternotomy wires. ET tube tip 7.2 cm  from the lucio. Stable low lung volumes and enlarged cardiac  silhouette. Slightly increased perihilar predominant opacities. No  significant pleural effusion. No pneumothorax.      Impression    Impression:   1. Mildly increased perihilar opacities, likely pulmonary edema or  atelectasis.  2. Stable low  lung volumes.  2. Multiple support devices are stable.    I have personally reviewed the examination and initial interpretation  and I agree with the findings.    KRYSTEN SOLIZ MD   XR Chest Port 1 View    Narrative    Exam: XR CHEST PORT 1 VW, 2/21/2020 1:20 AM    Indication: daily CXR for swan placement    Comparison: 2/20/2020.    Findings:   Single portable AP view of the chest. Endotracheal tube tip 4.7 cm  from the lucio. Enteric tube courses midline with tip outside the  field-of-view. [place]-Chucky catheter tip at the main pulmonary artery.  Left IJ CVC with tip at the superior cavoatrial junction. Bilateral  chest tubes and mediastinal drain are noted. LVAD and midline  sternotomy wires. Stable low lung volumes and enlarged cardiac  silhouette. Stable mild pulmonary vascular congestion. No pleural  effusion or pneumothorax      Impression    Impression:   1. Postoperative chest with stable low lung volumes and mild pulmonary  vascular congestion.  2. [place]-Chucky catheter tip at the main pulmonary artery. Multiple  additional support devices are stable.    I have personally reviewed the examination and initial interpretation  and I agree with the findings.    ANTHONY HUERTA MD   XR Feeding Tube Placement    Narrative    Feeding tube placement. 2/21/2020    Comparison: CT chest abdomen pelvis 2/8/2020    History: Feeding tube needed for nutrition.     Fluoroscopy time:  1.4 minute[s]    Technique: After injection of lidocaine gel into the left nostril, a  feeding tube was advanced under fluoroscopic guidance with the  assistance of an Amplatz wire for stiffness.    Findings: The feeding tube is advanced with the tip in the expected  location of the fourth portion of the duodenum. A small amount of  barium was injected during the procedure to define anatomy.      Impression    Impression: Uncomplicated feeding tube placement with tip in the  expected location of the fourth portion of the duodenum.    TIERRA ROCHA  MD ANGELA, attest that I was present for all critical  portions of the procedure and was immediately available to provide  guidance and assistance during the remainder of the procedure.     I have personally reviewed the examination and initial interpretation  and I agree with the findings.    TIERRA MILLER MD   XR Chest Port 1 View    Narrative    Exam: XR CHEST PORT 1 , 2/22/2020 4:06 AM    Indication: daily CXR for swan placement    Comparison: 2/21/2020.    Findings:   Single portable AP view of the chest. Endotracheal and gastric tubes  been removed. New enteric tube with tip collimated outside the  field-of-view. [place]-Chucky catheter tip at the right main pulmonary  artery. Left IJ CVC with tip at the superior cavoatrial junction.  Bilateral chest tubes and mediastinal drains are stable. LVAD device  and midline sternotomy wires. Stable low lung volumes and enlarged  cardiac silhouette. Increased perihilar interstitial opacities.  Increased small left pleural effusion. Likely tiny right pleural  effusion. No pneumothorax.      Impression    Impression:   1. Slightly increased perihilar opacities, likely representing  pulmonary edema.  2. Increased small left pleural effusion. Likely tiny right pleural  effusion.  3. Endotracheal and gastric tubes been removed. New feeding tube is  noted. Additional support devices are stable.    I have personally reviewed the examination and initial interpretation  and I agree with the findings.    KRYSTEN SOLIZ MD   XR Chest Port 1 View    Narrative    Exam: XR CHEST PORT 1 VW, 2/23/2020 5:05 AM    Indication: daily CXR for swan placement    Comparison: 2/22/2020.    Findings:   Single portable AP view of the chest. Is rotated to the right.  [place]-Chucky catheter tip projects over the main pulmonary artery.  Bilateral chest tubes and mediastinal drains are stable. Enteric tube  is again noted with tip outside the field-of-view. LVAD device. Left  IJ central  lines have been removed. Midline sternotomy wires and  surgical clips over the mediastinum. Stable low lung volumes and  enlarged cardiac silhouette. Stable mild pulmonary vascular  congestion. No significant pleural effusion or pneumothorax.  Visualized upper abdomen is unremarkable.      Impression    Impression:   1. Stable cardiomegaly and mild pulmonary vascular congestion.  2. Removal of left internal jugular central lines. Additional support  devices are stable.    I have personally reviewed the examination and initial interpretation  and I agree with the findings.    KRYSTEN SOLIZ MD   XR Chest Port 1 View    Narrative    Exam: XR CHEST PORT 1 , 2/23/2020 9:13 AM    Indication: [place] placement    Comparison: 2/23/2020 at 0148    Findings:   AP view of the chest. Right IJ approach [place]-Chucky catheter with the  tip in the main pulmonary outflow tract. Unchanged mediastinal and  bilateral chest drains. Enteric tube with the distal end extending  beyond the field-of-view. Stable position of an LVAD device. Intact  median sternotomy wires.    Unchanged cardiomegaly. Low lung volumes. Pulmonary vascular  congestion similar to prior exam. No large pleural effusion or  pneumothorax. No acute findings in the upper abdomen. Degenerative  changes of the spine and shoulders. No acute osseous findings.      Impression    Impression:   1. [place]-Chucky catheter with the tip in the main pulmonary outflow  tract. Additional support devices as detailed above.  2. Unchanged pulmonary vascular congestion.    I have personally reviewed the examination and initial interpretation  and I agree with the findings.    KRYSTEN SOLIZ MD   XR Chest Port 1 View    Narrative    EXAMINATION: XR CHEST PORT 1 , 2/24/2020 12:50 AM    INDICATION: daily CXR for swan placement    COMPARISON: Chest x-ray 2/23/2020    FINDINGS: Single portable AP radiograph of the chest. Right IJ  [place]-Chucky catheter tip projects over the right main  pulmonary artery.  Stable positioning of LVAD. Stable positioning of bilateral chest  tubes and mediastinal drain. Enteric tube courses beyond the  field-of-view. Trachea is midline. The cardiomediastinal silhouette is  within normal limits. Low lung volumes. No appreciable pneumothorax.  Trace right pleural effusion. No left pleural effusion. No focal  airspace opacity.      Impression    IMPRESSION:  1. [place]-Chucky catheter tip projects over the right main pulmonary  artery. Remainder of the support devices are stable.  2. Low lung volumes and trace right pleural effusion. No left pleural  effusion.    I have personally reviewed the examination and initial interpretation  and I agree with the findings.    JORGITO KELLY MD   XR Chest Port 1 View    Narrative    EXAMINATION: XR CHEST PORT 1 , 2/25/2020 3:09 AM      Indication: Daily chest x-ray for [place] placement    Comparison: 2/24/2020 chest x-ray    Findings:   Single portable supine radiograph of the chest. Right IJ [place]-Chucky  catheter tip projects over the right main pulmonary artery. Stable  positioning of LVAD. Stable positioning of bilateral chest tubes and a  mediastinal drain. Enteric tube courses beyond the field-of-view.  Trachea is midline. The cardiac mediastinal silhouette is within  normal limits. Low lung volumes. No pneumothorax. Trace right pleural  effusion. No left pleural effusion. Prominent pulmonary vasculature  and interstitial opacities.      Impression    Impression:   1. [place]-Chucky catheter tip projects over the right main pulmonary  artery. Remainder of support devices are stable.  2. Low lung volumes and trace right pleural effusion.  3. Mild pulmonary interstitial edema.    I have personally reviewed the examination and initial interpretation  and I agree with the findings.    KRYSTEN SOLIZ MD   XR Chest Port 1 View    Narrative    EXAMINATION: XR CHEST PORT 1 VW, 2/26/2020 1:15 AM    INDICATION: daily CXR for swan  placement    COMPARISON: 2/25/2020    Findings:   Single portable supine radiograph of the chest. Right IJ [place]-Chucky  catheter tip projects over the right main pulmonary artery. Stable  positioning of LVAD. Stable positioning of bilateral chest tubes and a  mediastinal drain. Enteric tube courses beyond the field-of-view.  Trachea is midline. The cardiac mediastinal silhouette is within  normal limits. Low lung volumes. No pneumothorax. Trace right pleural  effusion. No left pleural effusion. Prominent pulmonary vasculature  and interstitial opacities.      Impression    Impression:   1. [place]-Chucky catheter tip projects over the right main pulmonary  artery. The remainder of support devices are stable.  2. Low lung volumes and trace right pleural effusion.  3. Decreased mild pulmonary interstitial edema.    I have personally reviewed the examination and initial interpretation  and I agree with the findings.    JORGITO KELLY MD   CT Chest w Contrast    Narrative    EXAMINATION: Chest CT  without contrast     CLINICAL HISTORY: Shortness of breath, concern for cardiac tamponade.  LVAD placement 2/18/2020.    COMPARISON: CT 2/8/2020.    TECHNIQUE: CT imaging obtained through the chest without contrast.  Coronal and axial MIP reformatted images obtained.     FINDINGS:  Biapical chest tubes with a tiny right apical pneumothorax. Trace left  apical pneumothorax is present. Small bilateral pleural effusions,  right greater than left, have improved from prior exam. Bibasilar  streaky consolidations with focal consolidation in the dependent  portion of the right upper lobe, overall improved from prior exam.  Linear atelectasis in the low anterior right upper lobe. Small  residual tree-in-bud nodularity in the right upper lobe, improved from  prior. No new focal airspace disease. Trachea and mainstem bronchi are  clear. Mild bronchial thickening in the lung bases.    Postoperative changes of LVAD placement with extensive  associated  streak artifact. LVAD is patent. 5.1 x 3.3 cm complex fluid collection  in the retrosternal region (series 2 image 27) without peripheral  enhancement. This extends superiorly along the sternum. Scattered  surgical clips in the anterior mediastinum. Three mediastinal drains  are noted with trace residual pneumomediastinum. The heart is mildly  enlarged without evidence of right heart strain. Likely trace  pericardial effusion. No ventricular septal bowing or reflux of  contrast into the IVC. Ascending aorta and main pulmonary trunk are  normal caliber. No central PE. Linear filling defects bilaterally in  the internal jugular veins, series 2 image 1 through 11 on the right  and series 2 images 3 through 8 on the left. This is not assessed on  the prior CT which is noncontrast. Patient had recent bilateral  central venous catheters and this may be related to same. There is  also some irregular enhancement in the superior SVC, series 2 image  118. Prominent mediastinal lymph nodes are again noted. Stable left  thyroid lobe hypodensity.    Upper abdomen: Partial visualized enteric tube with tip at the  proximal duodenum on the inferior most slice. Possible cholelithiasis.  Remainder of the visualized upper abdomen is grossly unremarkable on  this early arterial phase study.    Bones and soft tissues: No acute fracture. Midline sternotomy wires.      Impression    IMPRESSION:   1. Postsurgical change of LVAD placement with 5.1 cm complex  retrosternal fluid collection, likely a postoperative hematoma or  complex seroma. No CT findings to suggest heart strain. No large  pericardial effusion.  2. Decreased bilateral pleural effusions and associated bibasilar  consolidation, likely compressive atelectasis. Tree-in-bud nodularity  in the right upper lobe has also decreased, likely resolving  infection/aspiration. No new focal airspace disease.  3. Trace biapical pneumothoraces with chest tubes in place.  4.  Linear filling defects in the internal jugular veins bilaterally  along the tract of previous catheters possibly nonocclusive chronic  associated thrombus versus fibrin sheath. There is also some irregular  filling in the SVC which may also represent same. This does not have  the appearance of acute venous thrombus. Could correlate with venous  ultrasound as clinically warranted.    I have personally reviewed the examination and initial interpretation  and I agree with the findings.    LUPE MASON MD   XR Chest Port 1 View    Narrative    EXAM: XR CHEST PORT 1 VW  2/27/2020 8:09 AM     HISTORY:  eval tubes and lines       COMPARISON:  Chest x-ray 2/26/2020. CT chest on 6/20/2020    FINDINGS:   Portable semiupright view of the chest. Intact median sternotomy  wires. LVAD in place. Feeding tube courses below the diaphragm and  terminates inferior to the field of view. Stable position of bilateral  chest tubes and mediastinal drain. Removal right IJ [place]ucky  catheter. Stable cardiac silhouette. A unchanged prominent pulmonary  vasculature. Small bilateral pleural effusions. Stable trace right  apical pneumothorax.      Impression    IMPRESSION:   1. Removal of [place]-Chucky catheter. Remaining support devices are  stable.  2. Stable small bilateral pleural effusions.  3. Stable trace right apical pneumothorax.    I have personally reviewed the examination and initial interpretation  and I agree with the findings.    QUIQUE NICHOLS MD   XR Chest Port 1 View    Narrative    EXAM: XR CHEST PORT 1 VW  2/29/2020 9:37 AM     HISTORY:  s/p LVAD       COMPARISON:  Chest x-ray 2/27/2020. CT chest on 2/26/2020    FINDINGS:   Portable upright view of the chest. Intact median sternotomy wires.  LVAD in place. Feeding tube has been removed. Bilateral chest tubes  and mediastinal drain(s); two of the left sided chest tubes have been  removed.  Cardiac silhouette remains prominent, similar to prior.   Unchanged prominent  pulmonary vasculature. Small bilateral pleural  effusions. Stable trace right apical pneumothorax.      Impression    IMPRESSION:   1. Support devices as above.  2. Stable small bilateral pleural effusions and mild bibasilar  opacities.  3. Stable trace right apical pneumothorax.    JEREMIE JOE MD   XR Chest 2 Views    Narrative    EXAM: XR CHEST 2 VW  3/3/2020 12:55 PM      HISTORY: interval change    COMPARISON: 2/29/2020    TECHNIQUE: AP chest radiograph    FINDINGS:   Stable postsurgical changes of the chest, including biapical chest  tubes, left ventricular assist device, sternotomy wires. Trachea is  midline. Cardiomediastinal silhouette is mildly enlarged, slightly  decreased in size since comparison. Decreased interstitial prominence.  Low lung volumes, stable. Unchanged bibasilar streaky opacities.  Increased small left pleural effusion, no right pleural effusion. No  apical pneumothorax. Trace pneumomediastinum. No acute upper abdominal  pathology.      Impression    IMPRESSION:  1.  Improving pulmonary interstitial edema  2.  Biapical chest tubes, no apical pneumothorax.   3.  Increased small pleural effusion.    I have personally reviewed the examination and initial interpretation  and I agree with the findings.    QUIQUE NICHOLS MD   XR Chest Port 1 View    Narrative    EXAMINATION: XR CHEST PORT 1 VW, 3/3/2020 3:53 PM    INDICATION: CT removal    COMPARISON: Chest x-ray 3/3/2020    FINDINGS: Single portable 30 degree AP radiograph of the chest. Stable  positioning of LVAD. Interval removal of chest tubes. Sternotomy  wires.    Trachea is midline. The cardiomediastinal silhouette is stable. Low  lung volumes. Small right pleural effusion. No appreciable left  pleural effusion. No pneumothorax. Prominent pulmonary vasculature.      Impression    IMPRESSION:  1. Interval removal of bilateral chest tubes. Remainder of support  devices are stable.  2. Stable cardiomegaly with pulmonary vascular  congestion.  3. Small right pleural effusion. No appreciable left pleural effusion.    I have personally reviewed the examination and initial interpretation  and I agree with the findings.    DEVON CHARLES MD   XR Chest Port 1 View    Narrative    Exam: Portable chest    INDICATION: LVAD, all tubes out    COMPARISON: 3/3/2020    FINDINGS: LVAD again noted. Median sternotomy again present. There is  increased blunting right costophrenic angle is new well-demarcated  opacification projecting the right upper hemithorax, concerning for  increased effusion.      Impression    IMPRESSION: Increased right pleural effusion. LVAD. Recommend  follow-up to clearing exclude infection.    AIME GAY MD   XR Chest Port 1 View    Narrative    EXAM: XR CHEST PORT 1 VW  3/4/2020 3:32 PM     HISTORY:  bedside placement of R pleural tube       COMPARISON:  Chest x-ray from same date at 1041 hours    FINDINGS:   Portable semiupright view chest. LVAD. Postsurgical changes along the  with intact median sternotomy wires. New right chest tube in place.  Cardiac silhouette is stable in size. Persistent low lung volumes.  Unchanged layering right pleural effusion with well-demarcated  opacification of the right upper hemithorax. Left lung field is clear.  No appreciable pneumothorax.      Impression    IMPRESSION:   Placement of right chest tube with unchanged prominent right pleural  effusion. No pneumothorax.    I have personally reviewed the examination and initial interpretation  and I agree with the findings.    AIME GAY MD   CT Chest w/o Contrast     Value    Radiologist flags Large right hemothorax (Urgent)    Narrative    EXAMINATION: CT CHEST W/O CONTRAST, 3/4/2020 5:02 PM    TECHNIQUE:  Helical CT images from the thoracic inlet through the  upper abdomen were obtained without intravenous contrast.  Images are  displayed at 1 and 5 mm intervals. Images reviewed in lung, soft  tissue, and bone  windows.    Radiation Dose (DLP): 613 mGy*cm    COMPARISON: CT 2/26/2020    HISTORY: right hemothorax, bedside chest tube placement    FINDINGS:  Thyroid is within normal limits. No thoracic adenopathy by size  criteria. Heart size is enlarged. There is a small pericardial  effusion versus hemopericardium. Moderate coronary artery  calcifications. Normal three-vessel aortic arch. Mild atheromatous  calcifications throughout the aortic arch and its major branches. LVAD  in place with outflow cannula at the left ventricle and inflow at the  ascending aorta. There is been no significant change in size in  retrosternal hematoma extending from the anterior superior mediastinum  to approximately the level of the xiphoid process. There is scattered  foci of gas in the retrosternal hematoma and coursing along the right  heart border. Stable position of the pericardial drain. Thoracic  esophagus is mildly patulous distally. Previously noted thrombi within  the internal jugular veins and the SVC are not appreciated on today's  noncontrast examination.    The central tracheobronchial tree is patent. New large hyperdense  right apical hemothorax compared to the previous CT and grossly stable  to recent radiographs. There is a small underlying right pleural  effusion. Fluid courses along the right major fissure. The right chest  tube is positioned within the right major fissure, abutting the  pericardium and with the tip immediately inferior to the right hilar  vasculature. Tiny right pneumothorax. No left pneumothorax. Small left  pneumothorax with areas of hyperdense fluid in the posterior mid/upper  hemithorax (series 2, image 29)    Limited evaluation of the upper abdomen. The visualized portions of  the liver are within normal limits. Dependent sludge in the  gallbladder. Hyperdense contrast is seen dependently within the  gastric fundus. The spleen and adrenal glands are within normal  limits. Moderate atrophy and fatty  infiltration of the visualized  pancreas. Partial visualization of the LVAD drive line coursing  through the abdominal wall.    Bones and soft tissue:   Tiny nondisplaced fractures of the right first and second ribs, not  present on 2/8/2020 (series 6, image 50 and image 56). Postoperative  changes of sternotomy. Mild degenerative changes of visualized spine.  Diffuse anasarca. Linear tracts of subcutaneous gas in the anterior  abdominal wall at the sites of previous mediastinal drains.      Impression    IMPRESSION:   1. Grossly stable large right apical hemothorax compared to recent  radiographs. Tiny nondisplaced fractures of the medial right first and  second ribs. Tiny right pneumothorax.   2. Intrafissural position of the right basilar chest tube abutting the  pericardium and with the tip immediately inferior to the right hilar  vasculature.   3. Small left pleural effusion, which also contains hyperdense fluid  suspicious for small component of left-sided hemothorax.  4. Stable retrosternal hematoma.  5. Cardiomegaly with small pericardial effusion versus  hemopericardium. Stable LVAD and pericardial drain.    [Urgent Result: Large right hemothorax]    Finding was identified on 3/4/2020 5:40 PM.     Dr. Zuñiga was contacted by Dr. Prieto at 3/4/2020 6:06 PM and  verbalized understanding of the urgent finding.     I have personally reviewed the examination and initial interpretation  and I agree with the findings.    DEION FLANAGAN MD   XR Chest Port 1 View    Narrative    Portable chest 3/5/2020    INDICATION: Evolution of the chest hemothorax    COMPARISON: Chest CT of 3/4/2020 and portable radiograph of 3/4/2020    FINDINGS: LVAD. Median sternotomy. Right pleural effusion/hemothorax  appears similar in extent. Minimal blunting left costophrenic angle  persists, indicating small effusion.      Impression    IMPRESSION: No significant change in right pleural effusion/right  hemothorax and small left  pleural effusion.    AIME GAY MD   EP Cardioversion External    Narrative    CARDIOVERSION    Duplicate Note    PROCEDURE:  direct current cardioversion  PROCEDURE DATE: 2020     Pre-procedure diagnosis:  At [place]  Post-procedure diagnosis: At Flutter  Complications:  none    BRIEF CLINICAL HISTORY:  See OP note for this procedure     PROCEDURE:  The patient arrived at the Echo Laboratory in a fasting,   non-sedated state.  Informed consent was previously obtained from patient,   who understood indications, risks, and benefits of the procedure.    Procedure as described in OP note from this day    Anesthesia by anesth service    IMPRESSION:  1.  Successful direct current cardioversion with 150J biphasic shock.    RECOMMENDATIONS/PLANS:  1.   Follow-up with in-house team  2.   Continue anticoagulation    I appreciated the opportunity to see and assess Mr Leach and direct the   procedure described above . The above note summarizes my findings and   current recommendations. Please do not hesitate to contact me if you have   any questions or concerns.    Jamar Butler MD [facility]   Pager [phone]  Office [phone]   Echocardiogram Complete    Narrative    [id number]  [id number]  [id number]  [id number]^MATT^NAHUN^JIM           [facility],[place]  Echocardiography Laboratory  [address] [place] [id number]     Name: WOLFGANG LEACH  MRN: [id number]  : 1955  Study Date: 2020 10:06 AM  Age: 64 yrs  Gender: Male  Patient Location: [facility]  Reason For Study: Heart Failure  Ordering Physician: NAHUN GUTIERREZ  Referring Physician: SELF, REFERRED  Performed By: Yvonne Adan RDCS     BSA: 2.2 m2  Height: 67 in  Weight: 244 lb  HR: 103  BP: 72/52 mmHg  _____________________________________________________________________________  __        Procedure  Complete Portable Echo Adult. Contrast Optison. Optison (NDC #6966-9316-15)  given intravenously. Patient was  given 6 ml mixture of 3 ml Optison and 6 ml  saline. 3 ml wasted.  _____________________________________________________________________________  __        Interpretation Summary  Left ventricular size is normal.  LVEF 20% based on biplane 2D tracing.  Mild to moderate right ventricular dilation is present.  Global right ventricular function is moderately reduced.  Pulmonary artery systolic pressure is normal.  Dilation of the inferior vena cava is present with abnormal respiratory  variation in diameter.  _____________________________________________________________________________  __        Left Ventricle  Left ventricular size is normal. LVEF 20% based on biplane 2D tracing. Left  ventricular wall thickness is normal. Diastolic function not assessed due to  frequent ectopy. Abnormal septal motion consistent with left bundle branch  block is present.     Right Ventricle  Mild to moderate right ventricular dilation is present. Global right  ventricular function is moderately reduced.     Atria  Mild biatrial enlargement is present.     Mitral Valve  Moderate mitral insufficiency is present.        Aortic Valve  Aortic valve is normal in structure and function.     Tricuspid Valve  Moderate tricuspid insufficiency is present. The right ventricular systolic  pressure is approximated at 24.4 mmHg plus the right atrial pressure.  Pulmonary artery systolic pressure is normal.     Pulmonic Valve  The pulmonic valve cannot be assessed. Mild pulmonic insufficiency is present.     Vessels  The aorta root is normal. The pulmonary artery cannot be assessed. Dilation of  the inferior vena cava is present with abnormal respiratory variation in  diameter.     Compared to Previous Study  Previous study not available for comparison.     _____________________________________________________________________________  __  MMode/2D Measurements & Calculations  IVSd: 0.61 cm  LVIDd: 4.7 cm  LVIDs: 3.7 cm  LVPWd: 0.75 cm  FS: 21.7 %  LV  mass(C)d: 99.1 grams  LV mass(C)dI: 45.0 grams/m2     Ao root diam: 2.9 cm  asc Aorta Diam: 2.5 cm  LVOT diam: 1.9 cm  LVOT area: 2.8 cm2     EF(MOD-bp): 21.5 %  LA Volume (BP): 84.8 ml  LA Volume Index (BP): 38.5 ml/m2  RWT: 0.32        Doppler Measurements & Calculations  PA acc time: 0.09 sec     PI end-d saumya: 154.0 cm/sec  TR max saumya: 247.0 cm/sec  TR max P.4 mmHg     _____________________________________________________________________________  __           Report approved by: Graciela Tomlinosn 2020 12:05 PM      Echocardiogram Limited    Narrative    [id number]  [id number]  [id number]  [id number]^JANIE^SERENA^VIPUL           [facility],[place]  Echocardiography Laboratory  [address] [place], [place] [address]     Name: WOLFGANG LEACH  MRN: [id number]  : 1955  Study Date: 02/10/2020 11:23 AM  Age: 64 yrs  Gender: Male  Patient Location: [facility]  Reason For Study: Heart Failure, Unspecified  Ordering Physician: SERENA LIMA  Referring Physician: SELF, REFERRED  Performed By: Kari Wang RDCS     BSA: 2.2 m2  Height: 67 in  Weight: 237 lb  BP: 111/57 mmHg  _____________________________________________________________________________  __        Procedure  Limited Portable Echo Adult.  _____________________________________________________________________________  __        Interpretation Summary  Mild left ventricular dilation. The visually estimated LVEF is 10-15%.  Moderate right ventricular dilation with moderately reduced global right  ventricular function is moderately reduced.  Estimated pulmonary artery systolic pressure is 29 mmHg plus right atrial  pressure.  IVC diameter >2.1 cm collapsing <50% with sniff suggests a high RA pressure  estimated at 15 mmHg or greater.     This study was compared with the study from 20. Minimal change in  biventricular function.  _____________________________________________________________________________  __        Left  Ventricle  Mild left ventricular dilation is present. The Ejection Fraction is estimated  at 10-15%. Severe diffuse hypokinesis is present.     Right Ventricle  Moderate right ventricular dilation is present. Global right ventricular  function is moderately reduced. A right heart catheter is noted in the right  ventricle.     Atria  Mild biatrial enlargement is present.     Aortic Valve  The valve leaflets are not well visualized. Trace aortic insufficiency is  present.        Tricuspid Valve  The tricuspid valve is normal. Mild tricuspid insufficiency is present.  Estimated pulmonary artery systolic pressure is 29 mmHg plus right atrial  pressure.     Pulmonic Valve  The valve leaflets are not well visualized. Trace pulmonic insufficiency is  present.     Vessels  Normal diameter aortic root and proximal ascending aorta. [place]on pump noted in  the descending aorta. Dilation of the inferior vena cava is present with  abnormal respiratory variation in diameter. IVC diameter >2.1 cm collapsing  <50% with sniff suggests a high RA pressure estimated at 15 mmHg or greater.     Pericardium  No pericardial effusion is present.     Compared to Previous Study  This study was compared with the study from 20 .     _____________________________________________________________________________  __  MMode/2D Measurements & Calculations  IVSd: 0.82 cm  LVIDd: 6.2 cm  LVIDs: 5.6 cm  LVPWd: 0.85 cm  FS: 9.1 %     LV mass(C)d: 209.0 grams  LV mass(C)dI: 96.2 grams/m2  RWT: 0.27  TAPSE: 1.7 cm        Doppler Measurements & Calculations  TV max P.3 mmHg  TR max saumya: 265.9 cm/sec  TR max P.3 mmHg     _____________________________________________________________________________  __           Report approved by: Graciela Watson 02/10/2020 12:48 PM      Echocardiogram Limited    Narrative    [id number]  [id number]  [id number]  [id number]^SHEBA^TIERRA           [facility],[place]  Echocardiography  Laboratory  [address] [place] [address]     Name: WOLFGANG LEACH  MRN: [id number]  : 1955  Study Date: 2020 11:11 AM  Age: 64 yrs  Gender: Male  Patient Location: [facility]  Reason For Study: Arrhythmia  Ordering Physician: TIERRA SCRUGGS  Referring Physician: SELF, REFERRED  Performed By: Viridiana Wiseman RDCS, TYE     BSA: 2.1 m2  Height: 67 in  Weight: 228 lb  BP: 94/79 mmHg  _____________________________________________________________________________  __        Procedure  Limited Portable Echo Adult.  _____________________________________________________________________________  __        Interpretation Summary  LVAD 3 5600     Left ventricular size is normal. Severely reduced left ventricular systolic  function. Septum is midline  LVAD inflow or outflow cannulae not visualized. Doppler velocities are not  elevated.  Moderate to severely reduced right ventricular systolic function.  Aortic valve appears closed during the entire cardiac cycle. Trace aortic  insufficiency is present.  IVC is plethoric.  No pericardial effusion present.     _____________________________________________________________________________  __        Left Ventricle  Left ventricular size is normal. LVIDd = 5.2 cm. Septum is midline. Severely  (EF 10-20%) reduced left ventricular function is present. LVAD inflow or  outflow cannulae not visualized. Doppler velocities are not elevated.     Right Ventricle  Mild to moderate right ventricular dilation is present. Global right  ventricular function is moderately to severely reduced. A right heart catheter  is noted in the right ventricle.     Aortic Valve  Aortic valve appears closed during the entire cardiac cycle. Trace aortic  insufficiency is present.     Tricuspid Valve  Mild tricuspid insufficiency is present.        Compared to Previous Study  This study was compared with the study from 2.10.20 . LVAD is  new.  _____________________________________________________________________________  __     MMode/2D Measurements & Calculations  LVIDd: 5.2 cm  LVIDs: 4.5 cm  FS: 13.0 %        Doppler Measurements & Calculations  TR max saumya: 226.0 cm/sec  TR max P.4 mmHg     _____________________________________________________________________________  __           Report approved by: Graciela Caballero 2020 01:29 PM      Cardiac Catheterization    Narrative      Successful insertion of a IABP in the RFA      Cardiac Catheterization    Narrative    Successful placement of left femoral artery IABP with removal of right   femoral artery IABP.  Successful placement of right internal jugular [place] Chucky catheter. LIJ   catheter removed.  Successful placement of left internal jugular hemodialysis catheter.  Successful placement of right radial artery arterial line.       Patient seen, findings and plan discussed with surgical ICU staff.    Loreto Holcomb MD

## 2020-03-05 NOTE — PLAN OF CARE
Transfer  Transferred to:  ICU  Via:bed  Reason for transfer: Pt inappropriate for 6C- worsening patient condition  Family: Aware of transfer, wife meeting staff and pt down in ICU room assignment  Belongings: Sent with pt  Chart: Sent with pt  Medications: Meds from bin sent with pt  Report called to: KRISTEN Ingram   ___    Pt alert and oriented x4. Pt A.Fib with HRs in the 90s. LVAD free of alarms, numbers WDL. Dressing CDI. On 2-3L O2 via NC, sats >92%. Reports R upper chest pain, PRN dilaudid given 1x. Pt denies nausea. Per provider, pt to continue NPO status. Last BM this morning. Pt voiding, using urinal appropriately. Hgb this afternoon 6.7, two units of RBCs ordered and to be given. First unit started and currently running at 100 ml/hr. Pt went down for chest CT this afternoon, results pending. R CT continued to suction with sanguinous output. Pt transported down to  ICU. Float float and 6C circ RN accompanying pt.

## 2020-03-05 NOTE — ANESTHESIA PREPROCEDURE EVALUATION
Anesthesia Pre-Procedure Evaluation    Patient: Eliseo Tanner   MRN:     [id number] Gender:   male   Age:    64 year old :      1955        Preoperative Diagnosis: Hemothorax [J94.2]   Procedure(s):  THORACOSCOPY WASHOUT POSSIBLE  THORACOTOMY     LABS:  CBC:   Lab Results   Component Value Date    WBC 11.2 (H) 2020    WBC 11.8 (H) 2020    HGB 8.1 (L) 2020    HGB 8.4 (L) 2020    HCT 27.5 (L) 2020    HCT 28.4 (L) 2020     2020     2020     BMP:   Lab Results   Component Value Date     2020     2020    POTASSIUM 4.2 2020    POTASSIUM 4.0 2020    CHLORIDE 103 2020    CHLORIDE 104 2020    CO2 28 2020    CO2 26 2020    BUN 17 2020    BUN 20 2020    CR 1.07 2020    CR 1.23 2020     (H) 2020     (H) 2020     COAGS:   Lab Results   Component Value Date    PTT 43 (H) 2020    INR 1.41 (H) 2020    FIBR 247 2020     POC:   Lab Results   Component Value Date     (H) 2020     OTHER:   Lab Results   Component Value Date    PH 7.46 (H) 2020    LACT 0.7 2020    A1C 7.3 (H) 2020    CALOS 8.1 (L) 2020    PHOS 4.4 2020    MAG 2.4 (H) 2020    ALBUMIN 1.8 (L) 2020    PROTTOTAL 5.7 (L) 2020    ALT 21 2020    AST 20 2020    ALKPHOS 106 2020    BILITOTAL 0.3 2020    TSH 1.72 2020    CRP 77.0 (H) 2020        Preop Vitals    BP Readings from Last 3 Encounters:   20 97/87    Pulse Readings from Last 3 Encounters:   20 60      Resp Readings from Last 3 Encounters:   20 16    SpO2 Readings from Last 3 Encounters:   20 96%      Temp Readings from Last 1 Encounters:   20 36.6  C (97.9  F) (Oral)    Ht Readings from Last 1 Encounters:   No data found for Ht      Wt Readings from Last 1 Encounters:   20 96.8 kg (213 lb  "6.4 oz)    Estimated body mass index is 33.42 kg/m  as calculated from the following:    Height as of this encounter: 1.702 m (5' 7\").    Weight as of this encounter: 96.8 kg (213 lb 6.4 oz).     LDA:  Peripheral IV 02/13/20 Left Upper forearm (Active)   Infiltration Site Treatment Method  None 3/2/2020  9:00 PM   Dressing Intervention Dressing reinforced 2/29/2020  1:00 PM   Number of days: 21       Peripheral IV 02/26/20 Right;Posterior Upper forearm (Active)   Infiltration Site Treatment Method  None 3/2/2020  4:00 PM   Number of days: 8       ETT (Active)   Number of days: 0       Chest Tube 1 Right Fourth intercostal space 26 Sinhala (Active)   Site Assessment UTV 3/5/2020 12:00 PM   Suction -20 cm H2O 3/5/2020 12:00 PM   Chest Tube Airleak No 3/5/2020 12:00 PM   Drainage Description Sanguinous 3/5/2020 12:00 PM   Dressing Status Normal: Clean, Dry & Intact 3/5/2020 12:00 PM   Dressing Change Due 03/06/20 3/5/2020  8:00 AM   Dressing Intervention Gauze 3/5/2020 12:00 PM   Patency Intervention Tip/Tilt 3/5/2020 12:00 PM   Chest Tube Clamps at Bedside present 3/5/2020 12:00 PM   Container Amount 1220 3/5/2020  2:00 PM   Output (ml) 0 ml 3/5/2020  2:00 PM   Number of days: 1        No past medical history on file.   Past Surgical History:   Procedure Laterality Date     ANESTHESIA CARDIOVERSION N/A 2/28/2020    Procedure: ANESTHESIA, FOR CARDIOVERSION;  Surgeon: GENERIC ANESTHESIA PROVIDER;  Location: [facility] OR     CV CENTRAL VENOUS CATHETER PLACEMENT N/A 2/13/2020    Procedure: Central Venous Catheter Placement;  Surgeon: Chente Moss MD;  Location:  HEART CARDIAC CATH LAB     CV INTRA-AORTIC BALLOON PUMP INSERTION N/A 2/7/2020    Procedure: Intra-Aortic Balloon Pump Insertion;  Surgeon: Jose Baldwin MD;  Location:  HEART CARDIAC CATH LAB     CV INTRA-AORTIC BALLOON PUMP INSERTION N/A 2/13/2020    Procedure: Intra-Aortic Balloon Pump Insertion;  Surgeon: Chente Moss MD;  " Location:  HEART CARDIAC CATH LAB     CV SWAN LUCIANA PROCEDURE N/A 2/13/2020    Procedure: [place] Luc[place] Procedure;  Surgeon: Chente Moss MD;  Location:  HEART CARDIAC CATH LAB     INSERT VENTRICULAR ASSIST DEVICE LEFT (HEARTMATE II) N/A 2/18/2020    Procedure: INSERTION, LEFT VENTRICULAR ASSIST DEVICE (HEARTMATE III);  Surgeon: Mac Jaramillo MD;  Location:  OR      Allergies   Allergen Reactions     Heparin      HIT screen positive 2/14/20, reflex DAVINA negative; however heme recommended treating as if positive  HIT screen negative 2/11/20     Oxycodone Itching and Other (See Comments)        Anesthesia Evaluation     .             ROS/MED HX    ENT/Pulmonary:     (+)sleep apnea, uses CPAP , . Other pulmonary disease Hemothorax.    Neurologic:       Cardiovascular:     (+) --CAD, --. : . CHF etiology: NICM Last EF: 10-20% date: 02/2020 NYHA classification: IV. . :. . Previous cardiac testing Echodate:02/2020results:Left ventricular size is normal. Severely reduced left ventricular systolic  function. Septum is midline  LVAD inflow or outflow cannulae not visualized. Doppler velocities are not  elevated.  Moderate to severely reduced right ventricular systolic function.  Aortic valve appears closed during the entire cardiac cycle. Trace aortic  insufficiency is present.date: results:ECG reviewed date:3/5/2020 results:AF date: results:          METS/Exercise Tolerance:     Hematologic:     (+) Anemia, -      Musculoskeletal:         GI/Hepatic:     (+) GERD       Renal/Genitourinary:     (+) chronic renal disease, type: CRI, Pt does not require dialysis, Pt has no history of transplant,       Endo:     (+) type II DM .      Psychiatric:         Infectious Disease:         Malignancy:         Other:                         PHYSICAL EXAM:   Mental Status/Neuro:    Airway: Facies: Feasible  Mallampati: II  Mouth/Opening: Full  TM distance: > 6 cm  Neck ROM: Full   Respiratory:   Resp. Effort:  Normal      CV:   Edema: None   Comments:      Dental: Normal Dentition                Assessment:   ASA SCORE: 4    H&P: History and physical reviewed and following examination; no interval change.   Smoking Status:  Non-Smoker/Unknown   NPO Status: NPO Appropriate     Plan:   Anes. Type:  General   Pre-Medication: None   Induction:  IV (Standard)   Airway: ETT; Oral   Access/Monitoring: PIV; 2nd PIV; A-Line   Maintenance: Balanced     Postop Plan:   Postop Pain: Opioids  Postop Sedation/Airway: Not planned  Disposition: Outpatient     PONV Management:   Adult Risk Factors:, Non-Smoker, Postop Opioids   Prevention: Ondansetron     CONSENT: Direct conversation   Plan and risks discussed with: Patient   Blood Products: Consented (ALL Blood Products)                   Kari Batista MD

## 2020-03-05 NOTE — PROGRESS NOTES
D: Stopped by to see patient. No VAD related questions or concerns at this time.  I: Discussed POC and provided support and listened to patient and care giver's thoughts and concerns.  P: Continue to follow patient and address any questions or concerns patient and or caregiver may have.

## 2020-03-05 NOTE — PLAN OF CARE
Admitted/transferred from:  at 1850  Reason for admission/transfer: Hemothorax requiring transfusion  2 RN skin assessment: completed by Nataly PETERSON & Jm RN  Result of skin assessment and interventions/actions: No skin issues noted  Height, weight, drug calc weight: done  Patient belongings (see Flowsheet): With patient & wife  ?

## 2020-03-05 NOTE — PROGRESS NOTES
Advanced Heart Failure Consult Progress Note  Eliseo Tanner MRN: [id number]  Age: 64 year old, : 1955  Date: 2020      Faculty Attestation  Gucci Tomas M.D.    I personally saw and examined this patient, reviewed recent laboratories and imaging studies, discussed the case with the housestaff, and conveyed plan to the patient.  I answered all questions from patient and/or family. I agree with the examination, assessment and plan outlined here.              Assessment and Plan:     Mr Eliseo Tanner is a 65yo M w/PMHx notable for chronic systolic heart failure, NICM, moderate CAD, HTN, ABHINAV on CPAP, DM2, CKDIII who is transferred to [facility] for evaluation and management of advanced heart failure with arrhythmia now s/p HM 3 on .    Today's plan (3/5):  -Holding Hydralazine 100mg PO Q8, lisinopril 5mg po daily  -Still need ICD prior to discharge, discuss with EP re:timing   -Monitor chest tube output      Moderate CAD  Severe Mitral regurgitation  Chronic nonischemic systolic heart failure w/ reduced EF (15-20%) and exacerbation  New atrial fibrillation  NYHA Class III. Echo 2020: LVEF 15-20%; LVEDd 5.9 cm; 4+ MR. LHC 2019 w/ nonobstructive CAD w/ severe branch disease. Presented with increased wt gain, SOB, LE edema concerning for HF exacerbation, initially concerning for cardiogenic shock. Admitted to [facility] for further evaluation regarding need for advanced HF therapies. Patient is now s/p HM III placement on  with early post-op course complicated by bleeding that is slowing down as of  AM. Patient is persistently tachycardic to 130-140 with a wide-complex mostly-regular tachycardia. ECG obtained difficult to interpret given LVAD artifact, but afib w/ RVR and aberrancy vs. FDC vs. AT vs. Slow VT. S/p DCCV on  back into sinus rhythm.  -Continue ASA 81, atorvastatin 20 mg  -Continue coumadin with goal INR 2-3 / CFX goal 20-40 (while on  "Bival)  -Holding Hydralazine 100mg PO Q8, lisinopril 5mg po daily on 3/5  -LVAD speed at 5500 RPM    #Retrosternal hematoma:  As seen on 2/26 CT chest at 5.1 cm. S/P chest tube per CVTS on 3/5.    Proteus and Enterococcus bacteremia  Organisms growing from 2/2 blood cultures from 2/13. Blood cultured from 2/16 NGTD and 2/15 growing 1/2 S.epi, likely contaminant per ID.  ID consulted, appreciate help. Will decide on final duration of abx.  -daily blood cultures until negative  -Zosyn (2/13-2/14)  -Tobramycin (2/13-2/14)  -Vancomycin (2/13-2/17  -Meropenem (2/14-2/15)  -Ceftriaxone (2/16-2/28)  -Ampicillin (2/16-3/11) (14 days after swan removal on 2/26)    #Thrombocytopenia:  Concern for HIT given timing with respect to heparin exposure. HIT positive DAVINA negative. Antibody is now negative x2, thus no further indication for PLAX  -Heme consulted  -Holding Bivalirudin gtt after LVAD surgery,goal chromogenic level 20-40 (given hemothorax)    VFib requiring shock  Shocked x 3 prior to transfer, loaded with amio. No known prior history. Suspect related to underlying HF.   -Keep K>4, Mg>2  -Amio 400 mg PO QD  -Needs ICD for secondary prevention, will need to discuss with EP     Atrial flutter   S/p cardioversion 2/28/20   - Holding coumadin for anticoagulation, bival bridge given hemothorax      CODE: FULL CODE     Stanford Acuna M.D.                Subjective/Interval Events     No acute overnight events. This AM, patient having new chest tube site pain, but no SOB, CP. Reports that he is sleeping better than in prior nights.          Objective     BP (!) 69/57   Pulse 60   Temp 98.4  F (36.9  C) (Axillary)   Resp 16   Ht 1.702 m (5' 7\")   Wt 96.8 kg (213 lb 6.4 oz)   SpO2 97%   BMI 33.42 kg/m    Temp:  [97.1  F (36.2  C)-98.8  F (37.1  C)] 98.4  F (36.9  C)  Pulse:  [] 60  Heart Rate:  [] 128  Resp:  [14-24] 16  BP: ()/(41-94) 69/57  SpO2:  [92 %-100 %] 97 %  Wt Readings from Last 2 Encounters: "   03/04/20 96.8 kg (213 lb 6.4 oz)     I/O last 3 completed shifts:  In: 2090 [P.O.:390; I.V.:460]  Out: 2295 [Urine:1125; Chest Tube:1170]      Gen: No acute distress  HEENT: NC/AT, +JVD   PULM/THORAX: CTAB  CV: normal rate, regular rhythm, normal S1 and S2, LVAD hum  ABD: Soft, NTND, bowel sounds present, no masses  EXT: WWP. 1+ pitting LE edema, clubbing or cyanosis.  NEURO: A&Ox3                Data:     Recent Results (from the past 24 hour(s))   Glucose by meter    Collection Time: 03/04/20 10:17 AM   Result Value Ref Range    Glucose 167 (H) 70 - 99 mg/dL   Lactic acid level STAT    Collection Time: 03/04/20 10:38 AM   Result Value Ref Range    Lactate for Sepsis Protocol 1.4 0.7 - 2.0 mmol/L   Plasma prepare order unit    Collection Time: 03/04/20 11:20 AM   Result Value Ref Range    Blood Component Type Plasma     Units Ordered 2    Blood component    Collection Time: 03/04/20 11:20 AM   Result Value Ref Range    Unit Number [id number]     Blood Component Type Plasma, Thawed     Division Number 00     Status of Unit Released to care unit     Blood Product Code [id number]     Unit Status ISS    Blood component    Collection Time: 03/04/20 11:20 AM   Result Value Ref Range    Unit Number [id number]     Blood Component Type Plasma, Thawed     Division Number 00     Status of Unit Released to care unit     Blood Product Code [id number]     Unit Status ISS    ABO/Rh type and screen    Collection Time: 03/04/20 11:21 AM   Result Value Ref Range    Units Ordered 2     ABO A     RH(D) Pos     Antibody Screen Neg     Test Valid Only At          [facility]    Specimen Expires 03/07/2020     Crossmatch Red Blood Cells    Blood component    Collection Time: 03/04/20 11:21 AM   Result Value Ref Range    Unit Number [id number]     Blood Component Type Red Blood Cells Leukocyte Reduced     Division Number 00     Status of Unit Released to care unit     Blood Product Code  E2331J12     Unit Status ISS    Blood component    Collection Time: 03/04/20 11:21 AM   Result Value Ref Range    Unit Number [id number]     Blood Component Type Red Blood Cells Leukocyte Reduced     Division Number 00     Status of Unit Released to care unit     Blood Product Code [id number]     Unit Status ISS    Hemoglobin    Collection Time: 03/04/20 11:28 AM   Result Value Ref Range    Hemoglobin 7.9 (L) 13.3 - 17.7 g/dL   Glucose by meter    Collection Time: 03/04/20  1:20 PM   Result Value Ref Range    Glucose 188 (H) 70 - 99 mg/dL   INR    Collection Time: 03/04/20  3:52 PM   Result Value Ref Range    INR 1.84 (H) 0.86 - 1.14   Hemoglobin    Collection Time: 03/04/20  3:52 PM   Result Value Ref Range    Hemoglobin 6.7 (LL) 13.3 - 17.7 g/dL   CT Chest w/o Contrast    Collection Time: 03/04/20  5:02 PM   Result Value Ref Range    Radiologist flags Large right hemothorax (Urgent)    Glucose by meter    Collection Time: 03/04/20  5:25 PM   Result Value Ref Range    Glucose 142 (H) 70 - 99 mg/dL   Glucose by meter    Collection Time: 03/04/20 10:13 PM   Result Value Ref Range    Glucose 139 (H) 70 - 99 mg/dL   CBC with platelets    Collection Time: 03/04/20 10:22 PM   Result Value Ref Range    WBC 11.8 (H) 4.0 - 11.0 10e9/L    RBC Count 3.01 (L) 4.4 - 5.9 10e12/L    Hemoglobin 8.8 (L) 13.3 - 17.7 g/dL    Hematocrit 28.4 (L) 40.0 - 53.0 %    MCV 94 78 - 100 fl    MCH 29.2 26.5 - 33.0 pg    MCHC 31.0 (L) 31.5 - 36.5 g/dL    RDW 16.4 (H) 10.0 - 15.0 %    Platelet Count 369 150 - 450 10e9/L   Basic metabolic panel    Collection Time: 03/05/20  4:34 AM   Result Value Ref Range    Sodium 134 133 - 144 mmol/L    Potassium 4.2 3.4 - 5.3 mmol/L    Chloride 103 94 - 109 mmol/L    Carbon Dioxide 28 20 - 32 mmol/L    Anion Gap 4 3 - 14 mmol/L    Glucose 121 (H) 70 - 99 mg/dL    Urea Nitrogen 17 7 - 30 mg/dL    Creatinine 1.07 0.66 - 1.25 mg/dL    GFR Estimate 72 >60 mL/min/[1.73_m2]    GFR Estimate If Black 83 >60  mL/min/[1.73_m2]    Calcium 8.1 (L) 8.5 - 10.1 mg/dL   CBC with platelets    Collection Time: 20  4:34 AM   Result Value Ref Range    WBC 11.2 (H) 4.0 - 11.0 10e9/L    RBC Count 2.91 (L) 4.4 - 5.9 10e12/L    Hemoglobin 8.4 (L) 13.3 - 17.7 g/dL    Hematocrit 27.5 (L) 40.0 - 53.0 %    MCV 95 78 - 100 fl    MCH 28.9 26.5 - 33.0 pg    MCHC 30.5 (L) 31.5 - 36.5 g/dL    RDW 16.7 (H) 10.0 - 15.0 %    Platelet Count 401 150 - 450 10e9/L   Magnesium    Collection Time: 20  4:34 AM   Result Value Ref Range    Magnesium 2.4 (H) 1.6 - 2.3 mg/dL   INR    Collection Time: 20  4:34 AM   Result Value Ref Range    INR 1.41 (H) 0.86 - 1.14   Partial thromboplastin time    Collection Time: 20  4:34 AM   Result Value Ref Range    PTT 43 (H) 22 - 37 sec   Glucose by meter    Collection Time: 20  7:56 AM   Result Value Ref Range    Glucose 131 (H) 70 - 99 mg/dL       Recent Results (from the past 24 hour(s))   Echocardiogram Complete    Narrative    [id number]  [id number]  [id number]  [id number]^MATT^NAHUN^JIM           [facility]  Echocardiography Laboratory  [address] [place] [id number]     Name: WOLFGANG LEACH  MRN: [id number]  : 1955  Study Date: 2020 10:06 AM  Age: 64 yrs  Gender: Male  Patient Location: [facility]  Reason For Study: Heart Failure  Ordering Physician: NAHUN GUTIERREZ  Referring Physician: SELF, REFERRED  Performed By: Yvonne Adan RDCS     BSA: 2.2 m2  Height: 67 in  Weight: 244 lb  HR: 103  BP: 72/52 mmHg  _____________________________________________________________________________  __        Procedure  Complete Portable Echo Adult. Contrast Optison. Optison (NDC #6844-7929-24)  given intravenously. Patient was given 6 ml mixture of 3 ml Optison and 6 ml  saline. 3 ml wasted.  _____________________________________________________________________________  __        Interpretation Summary  Left ventricular size is normal.  LVEF 20%  based on biplane 2D tracing.  Mild to moderate right ventricular dilation is present.  Global right ventricular function is moderately reduced.  Pulmonary artery systolic pressure is normal.  Dilation of the inferior vena cava is present with abnormal respiratory  variation in diameter.  _____________________________________________________________________________  __        Left Ventricle  Left ventricular size is normal. LVEF 20% based on biplane 2D tracing. Left  ventricular wall thickness is normal. Diastolic function not assessed due to  frequent ectopy. Abnormal septal motion consistent with left bundle branch  block is present.     Right Ventricle  Mild to moderate right ventricular dilation is present. Global right  ventricular function is moderately reduced.     Atria  Mild biatrial enlargement is present.     Mitral Valve  Moderate mitral insufficiency is present.        Aortic Valve  Aortic valve is normal in structure and function.     Tricuspid Valve  Moderate tricuspid insufficiency is present. The right ventricular systolic  pressure is approximated at 24.4 mmHg plus the right atrial pressure.  Pulmonary artery systolic pressure is normal.     Pulmonic Valve  The pulmonic valve cannot be assessed. Mild pulmonic insufficiency is present.     Vessels  The aorta root is normal. The pulmonary artery cannot be assessed. Dilation of  the inferior vena cava is present with abnormal respiratory variation in  diameter.     Compared to Previous Study  Previous study not available for comparison.     _____________________________________________________________________________  __  MMode/2D Measurements & Calculations  IVSd: 0.61 cm  LVIDd: 4.7 cm  LVIDs: 3.7 cm  LVPWd: 0.75 cm  FS: 21.7 %  LV mass(C)d: 99.1 grams  LV mass(C)dI: 45.0 grams/m2     Ao root diam: 2.9 cm  asc Aorta Diam: 2.5 cm  LVOT diam: 1.9 cm  LVOT area: 2.8 cm2     EF(MOD-bp): 21.5 %  LA Volume (BP): 84.8 ml  LA Volume Index (BP): 38.5  ml/m2  RWT: 0.32        Doppler Measurements & Calculations  PA acc time: 0.09 sec     PI end-d saumya: 154.0 cm/sec  TR max saumya: 247.0 cm/sec  TR max P.4 mmHg     _____________________________________________________________________________  __           Report approved by: Graciela Tomlinson 2020 12:05 PM      XR Chest Port 1 View    Narrative    XR CHEST PORT 1 VW  2020 4:21 PM      HISTORY: SG Placement    COMPARISON: None available    FINDINGS: Single AP chest radiograph. [place] catheter tip is in the  proximal right main pulmonary artery. Right central line tip and right  upper extremity PICC line tip at the lower SVC. Trachea is midline,  cardiomegaly. Bilateral perihilar streaky opacities. Mild increased  interstitial opacities in the right lung with ill-defined pulmonary  vascularity. No pneumothorax or pleural effusion. No acute osseous or  abdominal abnormality. Elevated left hemidiaphragm.      Impression    IMPRESSION:  1. [place] catheter tip at the proximal right main pulmonary artery.  2. Cardiomegaly with mild pulmonary edema, especially on the right.    I have personally reviewed the examination and initial interpretation  and I agree with the findings.    DONG SOLORIO MD   Cardiac Catheterization    Narrative      Successful insertion of a IABP in the RFA                Medications

## 2020-03-05 NOTE — PLAN OF CARE
Discharge Planner OT   Patient plan for discharge: not addressed today   Current status: Session limited by SOB due to hemothorax. Pt min A for bed mobility and SBA with FWW for transfer to chair. Pt continues to have difficulty switching batteries on LVAD 2/2, pt provided with [place] HEP and discussed modifications to increase independence. Pt on RA, SpO2 90% improved to >93% with 3L NC, HR 110s.   Barriers to return to prior living situation: medical management, post op precautions, deconditioning, level of A   Recommendations for discharge: ARU  Rationale for recommendations: Pt would benefit from intensive, interdisciplinary rehab to address the above stated deficits to facilitate return towards prior level of function. Pt can/will be able to tolerate 3 hours of therapy a day.          Entered by: Miranda Kaufman 03/05/2020 10:27 AM

## 2020-03-05 NOTE — PROGRESS NOTES
Sister-in-law, Grecia has completed education and passed her caregiver exam.  She still needs to test out on the dressing change and controller change at some point to be fully deemed a caregiver.

## 2020-03-06 ENCOUNTER — APPOINTMENT (OUTPATIENT)
Dept: GENERAL RADIOLOGY | Facility: CLINIC | Age: 65
DRG: 001 | End: 2020-03-06
Attending: INTERNAL MEDICINE
Payer: MEDICARE

## 2020-03-06 LAB
ABO + RH BLD: NORMAL
ABO + RH BLD: NORMAL
ANION GAP SERPL CALCULATED.3IONS-SCNC: 1 MMOL/L (ref 3–14)
APTT PPP: 45 SEC (ref 22–37)
BASE EXCESS BLDA CALC-SCNC: 1.4 MMOL/L
BASE EXCESS BLDA CALC-SCNC: 1.9 MMOL/L
BLD GP AB SCN SERPL QL: NORMAL
BLD PROD TYP BPU: NORMAL
BLD UNIT ID BPU: 0
BLD UNIT ID BPU: 0
BLOOD BANK CMNT PATIENT-IMP: NORMAL
BLOOD PRODUCT CODE: NORMAL
BLOOD PRODUCT CODE: NORMAL
BPU ID: NORMAL
BPU ID: NORMAL
BUN SERPL-MCNC: 16 MG/DL (ref 7–30)
CA-I BLD-MCNC: 4.5 MG/DL (ref 4.4–5.2)
CALCIUM SERPL-MCNC: 8 MG/DL (ref 8.5–10.1)
CHLORIDE SERPL-SCNC: 104 MMOL/L (ref 94–109)
CO2 SERPL-SCNC: 30 MMOL/L (ref 20–32)
CREAT SERPL-MCNC: 1.02 MG/DL (ref 0.66–1.25)
ERYTHROCYTE [DISTWIDTH] IN BLOOD BY AUTOMATED COUNT: 16.4 % (ref 10–15)
FACT X ACT/NOR PPP CHRO: 63 % (ref 70–130)
GFR SERPL CREATININE-BSD FRML MDRD: 76 ML/MIN/{1.73_M2}
GLUCOSE BLD-MCNC: 123 MG/DL (ref 70–99)
GLUCOSE BLDC GLUCOMTR-MCNC: 107 MG/DL (ref 70–99)
GLUCOSE BLDC GLUCOMTR-MCNC: 118 MG/DL (ref 70–99)
GLUCOSE BLDC GLUCOMTR-MCNC: 133 MG/DL (ref 70–99)
GLUCOSE BLDC GLUCOMTR-MCNC: 136 MG/DL (ref 70–99)
GLUCOSE BLDC GLUCOMTR-MCNC: 194 MG/DL (ref 70–99)
GLUCOSE BLDC GLUCOMTR-MCNC: 200 MG/DL (ref 70–99)
GLUCOSE BLDC GLUCOMTR-MCNC: 88 MG/DL (ref 70–99)
GLUCOSE SERPL-MCNC: 149 MG/DL (ref 70–99)
HCO3 BLD-SCNC: 26 MMOL/L (ref 21–28)
HCO3 BLD-SCNC: 27 MMOL/L (ref 21–28)
HCT VFR BLD AUTO: 26.5 % (ref 40–53)
HGB BLD-MCNC: 8 G/DL (ref 13.3–17.7)
HGB BLD-MCNC: 8 G/DL (ref 13.3–17.7)
INR PPP: 1.45 (ref 0.86–1.14)
INTERPRETATION ECG - MUSE: NORMAL
LACTATE BLD-SCNC: 0.7 MMOL/L (ref 0.7–2)
LACTATE BLD-SCNC: 0.9 MMOL/L (ref 0.7–2)
MAGNESIUM SERPL-MCNC: 2.2 MG/DL (ref 1.6–2.3)
MCH RBC QN AUTO: 28.8 PG (ref 26.5–33)
MCHC RBC AUTO-ENTMCNC: 30.2 G/DL (ref 31.5–36.5)
MCV RBC AUTO: 95 FL (ref 78–100)
NUM BPU REQUESTED: 6
O2/TOTAL GAS SETTING VFR VENT: 65 %
O2/TOTAL GAS SETTING VFR VENT: 65 %
OXYHGB MFR BLD: 81 % (ref 92–100)
PCO2 BLD: 42 MM HG (ref 35–45)
PCO2 BLD: 45 MM HG (ref 35–45)
PH BLD: 7.39 PH (ref 7.35–7.45)
PH BLD: 7.41 PH (ref 7.35–7.45)
PHOSPHATE SERPL-MCNC: 3.8 MG/DL (ref 2.5–4.5)
PLATELET # BLD AUTO: 392 10E9/L (ref 150–450)
PO2 BLD: 49 MM HG (ref 80–105)
PO2 BLD: 96 MM HG (ref 80–105)
POTASSIUM BLD-SCNC: 4.1 MMOL/L (ref 3.4–5.3)
POTASSIUM SERPL-SCNC: 4.1 MMOL/L (ref 3.4–5.3)
RBC # BLD AUTO: 2.78 10E12/L (ref 4.4–5.9)
SODIUM BLD-SCNC: 137 MMOL/L (ref 133–144)
SODIUM SERPL-SCNC: 135 MMOL/L (ref 133–144)
SPECIMEN EXP DATE BLD: NORMAL
TRANSFUSION STATUS PATIENT QL: NORMAL
WBC # BLD AUTO: 10.7 10E9/L (ref 4–11)

## 2020-03-06 PROCEDURE — 71000015 ZZH RECOVERY PHASE 1 LEVEL 2 EA ADDTL HR: Performed by: THORACIC SURGERY (CARDIOTHORACIC VASCULAR SURGERY)

## 2020-03-06 PROCEDURE — 36415 COLL VENOUS BLD VENIPUNCTURE: CPT | Performed by: STUDENT IN AN ORGANIZED HEALTH CARE EDUCATION/TRAINING PROGRAM

## 2020-03-06 PROCEDURE — 83605 ASSAY OF LACTIC ACID: CPT | Performed by: INTERNAL MEDICINE

## 2020-03-06 PROCEDURE — 85260 CLOT FACTOR X STUART-POWER: CPT | Performed by: STUDENT IN AN ORGANIZED HEALTH CARE EDUCATION/TRAINING PROGRAM

## 2020-03-06 PROCEDURE — 85730 THROMBOPLASTIN TIME PARTIAL: CPT | Performed by: PHYSICIAN ASSISTANT

## 2020-03-06 PROCEDURE — 25000128 H RX IP 250 OP 636: Performed by: PHYSICIAN ASSISTANT

## 2020-03-06 PROCEDURE — 25000132 ZZH RX MED GY IP 250 OP 250 PS 637: Mod: GY | Performed by: STUDENT IN AN ORGANIZED HEALTH CARE EDUCATION/TRAINING PROGRAM

## 2020-03-06 PROCEDURE — 71045 X-RAY EXAM CHEST 1 VIEW: CPT

## 2020-03-06 PROCEDURE — 27210794 ZZH OR GENERAL SUPPLY STERILE: Performed by: THORACIC SURGERY (CARDIOTHORACIC VASCULAR SURGERY)

## 2020-03-06 PROCEDURE — 82805 BLOOD GASES W/O2 SATURATION: CPT | Performed by: INTERNAL MEDICINE

## 2020-03-06 PROCEDURE — 36415 COLL VENOUS BLD VENIPUNCTURE: CPT | Performed by: PHYSICIAN ASSISTANT

## 2020-03-06 PROCEDURE — 25000132 ZZH RX MED GY IP 250 OP 250 PS 637: Mod: GY | Performed by: PHYSICIAN ASSISTANT

## 2020-03-06 PROCEDURE — 40000171 ZZH STATISTIC PRE-PROCEDURE ASSESSMENT III: Performed by: THORACIC SURGERY (CARDIOTHORACIC VASCULAR SURGERY)

## 2020-03-06 PROCEDURE — P9016 RBC LEUKOCYTES REDUCED: HCPCS | Performed by: PHYSICIAN ASSISTANT

## 2020-03-06 PROCEDURE — 37000009 ZZH ANESTHESIA TECHNICAL FEE, EACH ADDTL 15 MIN: Performed by: THORACIC SURGERY (CARDIOTHORACIC VASCULAR SURGERY)

## 2020-03-06 PROCEDURE — 84100 ASSAY OF PHOSPHORUS: CPT | Performed by: PHYSICIAN ASSISTANT

## 2020-03-06 PROCEDURE — 27210136 ZZH KIT CATH ARTERIAL EXT SUPPLY

## 2020-03-06 PROCEDURE — 36620 INSERTION CATHETER ARTERY: CPT | Mod: GC | Performed by: ANESTHESIOLOGY

## 2020-03-06 PROCEDURE — 37000008 ZZH ANESTHESIA TECHNICAL FEE, 1ST 30 MIN: Performed by: THORACIC SURGERY (CARDIOTHORACIC VASCULAR SURGERY)

## 2020-03-06 PROCEDURE — 25800030 ZZH RX IP 258 OP 636: Performed by: NURSE ANESTHETIST, CERTIFIED REGISTERED

## 2020-03-06 PROCEDURE — 25000125 ZZHC RX 250: Performed by: NURSE ANESTHETIST, CERTIFIED REGISTERED

## 2020-03-06 PROCEDURE — 84132 ASSAY OF SERUM POTASSIUM: CPT | Performed by: INTERNAL MEDICINE

## 2020-03-06 PROCEDURE — 82947 ASSAY GLUCOSE BLOOD QUANT: CPT | Performed by: INTERNAL MEDICINE

## 2020-03-06 PROCEDURE — 25000128 H RX IP 250 OP 636: Performed by: ANESTHESIOLOGY

## 2020-03-06 PROCEDURE — 40000275 ZZH STATISTIC RCP TIME EA 10 MIN

## 2020-03-06 PROCEDURE — 84295 ASSAY OF SERUM SODIUM: CPT | Performed by: INTERNAL MEDICINE

## 2020-03-06 PROCEDURE — 25000128 H RX IP 250 OP 636: Performed by: NURSE ANESTHETIST, CERTIFIED REGISTERED

## 2020-03-06 PROCEDURE — 25000132 ZZH RX MED GY IP 250 OP 250 PS 637: Mod: GY | Performed by: THORACIC SURGERY (CARDIOTHORACIC VASCULAR SURGERY)

## 2020-03-06 PROCEDURE — 80048 BASIC METABOLIC PNL TOTAL CA: CPT | Performed by: PHYSICIAN ASSISTANT

## 2020-03-06 PROCEDURE — 83735 ASSAY OF MAGNESIUM: CPT | Performed by: PHYSICIAN ASSISTANT

## 2020-03-06 PROCEDURE — 85610 PROTHROMBIN TIME: CPT | Performed by: PHYSICIAN ASSISTANT

## 2020-03-06 PROCEDURE — 25800030 ZZH RX IP 258 OP 636: Performed by: STUDENT IN AN ORGANIZED HEALTH CARE EDUCATION/TRAINING PROGRAM

## 2020-03-06 PROCEDURE — 25000128 H RX IP 250 OP 636: Performed by: SURGERY

## 2020-03-06 PROCEDURE — 71000014 ZZH RECOVERY PHASE 1 LEVEL 2 FIRST HR: Performed by: THORACIC SURGERY (CARDIOTHORACIC VASCULAR SURGERY)

## 2020-03-06 PROCEDURE — 40000986 XR CHEST PORT 1 VW

## 2020-03-06 PROCEDURE — 40000883 ZZH CANCELLED SURGERY UP TO 61-90 MINS: Performed by: STUDENT IN AN ORGANIZED HEALTH CARE EDUCATION/TRAINING PROGRAM

## 2020-03-06 PROCEDURE — 82803 BLOOD GASES ANY COMBINATION: CPT | Performed by: INTERNAL MEDICINE

## 2020-03-06 PROCEDURE — 20000004 ZZH R&B ICU UMMC

## 2020-03-06 PROCEDURE — 00000146 ZZHCL STATISTIC GLUCOSE BY METER IP

## 2020-03-06 PROCEDURE — 82330 ASSAY OF CALCIUM: CPT | Performed by: INTERNAL MEDICINE

## 2020-03-06 PROCEDURE — 36000064 ZZH SURGERY LEVEL 4 EA 15 ADDTL MIN - UMMC: Performed by: THORACIC SURGERY (CARDIOTHORACIC VASCULAR SURGERY)

## 2020-03-06 PROCEDURE — 0BCN4ZZ EXTIRPATION OF MATTER FROM RIGHT PLEURA, PERCUTANEOUS ENDOSCOPIC APPROACH: ICD-10-PCS | Performed by: THORACIC SURGERY (CARDIOTHORACIC VASCULAR SURGERY)

## 2020-03-06 PROCEDURE — 36000062 ZZH SURGERY LEVEL 4 1ST 30 MIN - UMMC: Performed by: THORACIC SURGERY (CARDIOTHORACIC VASCULAR SURGERY)

## 2020-03-06 PROCEDURE — 25000566 ZZH SEVOFLURANE, EA 15 MIN: Performed by: THORACIC SURGERY (CARDIOTHORACIC VASCULAR SURGERY)

## 2020-03-06 PROCEDURE — 36620 INSERTION CATHETER ARTERY: CPT

## 2020-03-06 PROCEDURE — 85027 COMPLETE CBC AUTOMATED: CPT | Performed by: PHYSICIAN ASSISTANT

## 2020-03-06 PROCEDURE — 25000128 H RX IP 250 OP 636: Performed by: STUDENT IN AN ORGANIZED HEALTH CARE EDUCATION/TRAINING PROGRAM

## 2020-03-06 PROCEDURE — 25000125 ZZHC RX 250: Performed by: THORACIC SURGERY (CARDIOTHORACIC VASCULAR SURGERY)

## 2020-03-06 RX ORDER — BUPIVACAINE HYDROCHLORIDE AND EPINEPHRINE 5; 5 MG/ML; UG/ML
INJECTION, SOLUTION PERINEURAL PRN
Status: DISCONTINUED | OUTPATIENT
Start: 2020-03-06 | End: 2020-03-06 | Stop reason: HOSPADM

## 2020-03-06 RX ORDER — SODIUM CHLORIDE, SODIUM LACTATE, POTASSIUM CHLORIDE, CALCIUM CHLORIDE 600; 310; 30; 20 MG/100ML; MG/100ML; MG/100ML; MG/100ML
INJECTION, SOLUTION INTRAVENOUS CONTINUOUS
Status: DISCONTINUED | OUTPATIENT
Start: 2020-03-06 | End: 2020-03-06

## 2020-03-06 RX ORDER — FENTANYL CITRATE 50 UG/ML
25-50 INJECTION, SOLUTION INTRAMUSCULAR; INTRAVENOUS
Status: DISCONTINUED | OUTPATIENT
Start: 2020-03-06 | End: 2020-03-06

## 2020-03-06 RX ORDER — NALOXONE HYDROCHLORIDE 0.4 MG/ML
.1-.4 INJECTION, SOLUTION INTRAMUSCULAR; INTRAVENOUS; SUBCUTANEOUS
Status: DISCONTINUED | OUTPATIENT
Start: 2020-03-06 | End: 2020-03-06

## 2020-03-06 RX ORDER — HYDROMORPHONE HCL/0.9% NACL/PF 0.2MG/0.2
.2-.4 SYRINGE (ML) INTRAVENOUS
Status: DISCONTINUED | OUTPATIENT
Start: 2020-03-06 | End: 2020-03-20 | Stop reason: HOSPADM

## 2020-03-06 RX ORDER — LIDOCAINE HYDROCHLORIDE 20 MG/ML
INJECTION, SOLUTION INFILTRATION; PERINEURAL PRN
Status: DISCONTINUED | OUTPATIENT
Start: 2020-03-06 | End: 2020-03-06

## 2020-03-06 RX ORDER — SODIUM CHLORIDE, SODIUM LACTATE, POTASSIUM CHLORIDE, CALCIUM CHLORIDE 600; 310; 30; 20 MG/100ML; MG/100ML; MG/100ML; MG/100ML
INJECTION, SOLUTION INTRAVENOUS CONTINUOUS
Status: DISCONTINUED | OUTPATIENT
Start: 2020-03-06 | End: 2020-03-06 | Stop reason: HOSPADM

## 2020-03-06 RX ORDER — GINSENG 100 MG
CAPSULE ORAL 3 TIMES DAILY
Status: DISCONTINUED | OUTPATIENT
Start: 2020-03-06 | End: 2020-03-08

## 2020-03-06 RX ORDER — DEXMEDETOMIDINE HYDROCHLORIDE 4 UG/ML
0.2-1.2 INJECTION, SOLUTION INTRAVENOUS CONTINUOUS
Status: CANCELLED | OUTPATIENT
Start: 2020-03-06

## 2020-03-06 RX ORDER — ONDANSETRON 4 MG/1
4 TABLET, ORALLY DISINTEGRATING ORAL EVERY 30 MIN PRN
Status: DISCONTINUED | OUTPATIENT
Start: 2020-03-06 | End: 2020-03-06

## 2020-03-06 RX ORDER — SODIUM CHLORIDE, SODIUM LACTATE, POTASSIUM CHLORIDE, CALCIUM CHLORIDE 600; 310; 30; 20 MG/100ML; MG/100ML; MG/100ML; MG/100ML
INJECTION, SOLUTION INTRAVENOUS CONTINUOUS PRN
Status: DISCONTINUED | OUTPATIENT
Start: 2020-03-06 | End: 2020-03-06

## 2020-03-06 RX ORDER — PROPOFOL 10 MG/ML
INJECTION, EMULSION INTRAVENOUS PRN
Status: DISCONTINUED | OUTPATIENT
Start: 2020-03-06 | End: 2020-03-06

## 2020-03-06 RX ORDER — OXYCODONE HYDROCHLORIDE 5 MG/1
5 TABLET ORAL EVERY 4 HOURS PRN
Status: DISCONTINUED | OUTPATIENT
Start: 2020-03-06 | End: 2020-03-06

## 2020-03-06 RX ORDER — LABETALOL 20 MG/4 ML (5 MG/ML) INTRAVENOUS SYRINGE
10
Status: COMPLETED | OUTPATIENT
Start: 2020-03-06 | End: 2020-03-06

## 2020-03-06 RX ORDER — SODIUM CHLORIDE, SODIUM GLUCONATE, SODIUM ACETATE, POTASSIUM CHLORIDE AND MAGNESIUM CHLORIDE 526; 502; 368; 37; 30 MG/100ML; MG/100ML; MG/100ML; MG/100ML; MG/100ML
INJECTION, SOLUTION INTRAVENOUS CONTINUOUS PRN
Status: DISCONTINUED | OUTPATIENT
Start: 2020-03-06 | End: 2020-03-06

## 2020-03-06 RX ORDER — WARFARIN SODIUM 3 MG/1
3 TABLET ORAL
Status: COMPLETED | OUTPATIENT
Start: 2020-03-06 | End: 2020-03-06

## 2020-03-06 RX ORDER — NOREPINEPHRINE BITARTRATE 0.06 MG/ML
.03-.4 INJECTION, SOLUTION INTRAVENOUS CONTINUOUS
Status: DISCONTINUED | OUTPATIENT
Start: 2020-03-06 | End: 2020-03-06 | Stop reason: HOSPADM

## 2020-03-06 RX ORDER — LIDOCAINE 40 MG/G
CREAM TOPICAL
Status: DISCONTINUED | OUTPATIENT
Start: 2020-03-06 | End: 2020-03-06 | Stop reason: HOSPADM

## 2020-03-06 RX ORDER — NOREPINEPHRINE BITARTRATE 0.06 MG/ML
0.03-0.4 INJECTION, SOLUTION INTRAVENOUS CONTINUOUS
Status: CANCELLED | OUTPATIENT
Start: 2020-03-06

## 2020-03-06 RX ORDER — FENTANYL CITRATE 50 UG/ML
25-50 INJECTION, SOLUTION INTRAMUSCULAR; INTRAVENOUS
Status: DISCONTINUED | OUTPATIENT
Start: 2020-03-06 | End: 2020-03-06 | Stop reason: HOSPADM

## 2020-03-06 RX ORDER — FENTANYL CITRATE 50 UG/ML
INJECTION, SOLUTION INTRAMUSCULAR; INTRAVENOUS PRN
Status: DISCONTINUED | OUTPATIENT
Start: 2020-03-06 | End: 2020-03-06

## 2020-03-06 RX ORDER — ONDANSETRON 2 MG/ML
4 INJECTION INTRAMUSCULAR; INTRAVENOUS EVERY 30 MIN PRN
Status: DISCONTINUED | OUTPATIENT
Start: 2020-03-06 | End: 2020-03-06 | Stop reason: HOSPADM

## 2020-03-06 RX ORDER — ONDANSETRON 2 MG/ML
INJECTION INTRAMUSCULAR; INTRAVENOUS PRN
Status: DISCONTINUED | OUTPATIENT
Start: 2020-03-06 | End: 2020-03-06

## 2020-03-06 RX ORDER — LIDOCAINE 40 MG/G
CREAM TOPICAL
Status: DISCONTINUED | OUTPATIENT
Start: 2020-03-06 | End: 2020-03-06

## 2020-03-06 RX ORDER — ONDANSETRON 4 MG/1
4 TABLET, ORALLY DISINTEGRATING ORAL EVERY 30 MIN PRN
Status: DISCONTINUED | OUTPATIENT
Start: 2020-03-06 | End: 2020-03-06 | Stop reason: HOSPADM

## 2020-03-06 RX ORDER — HYDROMORPHONE HYDROCHLORIDE 1 MG/ML
.3-.5 INJECTION, SOLUTION INTRAMUSCULAR; INTRAVENOUS; SUBCUTANEOUS EVERY 5 MIN PRN
Status: DISCONTINUED | OUTPATIENT
Start: 2020-03-06 | End: 2020-03-06 | Stop reason: HOSPADM

## 2020-03-06 RX ORDER — ONDANSETRON 2 MG/ML
4 INJECTION INTRAMUSCULAR; INTRAVENOUS EVERY 30 MIN PRN
Status: DISCONTINUED | OUTPATIENT
Start: 2020-03-06 | End: 2020-03-06

## 2020-03-06 RX ADMIN — FENTANYL CITRATE 25 MCG: 50 INJECTION, SOLUTION INTRAMUSCULAR; INTRAVENOUS at 17:27

## 2020-03-06 RX ADMIN — PANTOPRAZOLE SODIUM 40 MG: 40 TABLET, DELAYED RELEASE ORAL at 08:13

## 2020-03-06 RX ADMIN — ASPIRIN 81 MG CHEWABLE TABLET 81 MG: 81 TABLET CHEWABLE at 08:13

## 2020-03-06 RX ADMIN — DIGOXIN 125 MCG: 125 TABLET ORAL at 08:12

## 2020-03-06 RX ADMIN — WARFARIN SODIUM 3 MG: 3 TABLET ORAL at 19:46

## 2020-03-06 RX ADMIN — SODIUM CHLORIDE, POTASSIUM CHLORIDE, SODIUM LACTATE AND CALCIUM CHLORIDE: 600; 310; 30; 20 INJECTION, SOLUTION INTRAVENOUS at 14:10

## 2020-03-06 RX ADMIN — HYDROMORPHONE HYDROCHLORIDE 2 MG: 2 TABLET ORAL at 21:29

## 2020-03-06 RX ADMIN — LABETALOL 20 MG/4 ML (5 MG/ML) INTRAVENOUS SYRINGE 10 MG: at 16:50

## 2020-03-06 RX ADMIN — SODIUM CHLORIDE, SODIUM GLUCONATE, SODIUM ACETATE, POTASSIUM CHLORIDE AND MAGNESIUM CHLORIDE: 526; 502; 368; 37; 30 INJECTION, SOLUTION INTRAVENOUS at 15:15

## 2020-03-06 RX ADMIN — FENTANYL CITRATE 100 MCG: 50 INJECTION, SOLUTION INTRAMUSCULAR; INTRAVENOUS at 14:20

## 2020-03-06 RX ADMIN — AMPICILLIN SODIUM 2 G: 2 INJECTION, POWDER, FOR SOLUTION INTRAMUSCULAR; INTRAVENOUS at 01:58

## 2020-03-06 RX ADMIN — EPINEPHRINE 10 MCG: 1 INJECTION PARENTERAL at 14:35

## 2020-03-06 RX ADMIN — FLUOXETINE 20 MG: 20 CAPSULE ORAL at 08:13

## 2020-03-06 RX ADMIN — Medication 0.4 MG: at 18:47

## 2020-03-06 RX ADMIN — Medication 0.02 MCG/KG/MIN: at 15:36

## 2020-03-06 RX ADMIN — Medication 0.4 MG: at 23:11

## 2020-03-06 RX ADMIN — ALLOPURINOL 200 MG: 100 TABLET ORAL at 08:12

## 2020-03-06 RX ADMIN — INSULIN ASPART 3 UNITS: 100 INJECTION, SOLUTION INTRAVENOUS; SUBCUTANEOUS at 21:25

## 2020-03-06 RX ADMIN — ROCURONIUM BROMIDE 100 MG: 10 INJECTION INTRAVENOUS at 14:23

## 2020-03-06 RX ADMIN — MULTIPLE VITAMINS W/ MINERALS TAB 1 TABLET: TAB at 08:12

## 2020-03-06 RX ADMIN — ROCURONIUM BROMIDE 20 MG: 10 INJECTION INTRAVENOUS at 15:36

## 2020-03-06 RX ADMIN — FENTANYL CITRATE 50 MCG: 50 INJECTION, SOLUTION INTRAMUSCULAR; INTRAVENOUS at 17:36

## 2020-03-06 RX ADMIN — EPINEPHRINE 10 MCG: 1 INJECTION PARENTERAL at 14:32

## 2020-03-06 RX ADMIN — FENTANYL CITRATE 25 MCG: 50 INJECTION, SOLUTION INTRAMUSCULAR; INTRAVENOUS at 17:20

## 2020-03-06 RX ADMIN — AMPICILLIN SODIUM 2 G: 2 INJECTION, POWDER, FOR SOLUTION INTRAMUSCULAR; INTRAVENOUS at 13:45

## 2020-03-06 RX ADMIN — LIDOCAINE HYDROCHLORIDE 100 MG: 20 INJECTION, SOLUTION INFILTRATION; PERINEURAL at 14:20

## 2020-03-06 RX ADMIN — PROPOFOL 110 MG: 10 INJECTION, EMULSION INTRAVENOUS at 14:23

## 2020-03-06 RX ADMIN — EPINEPHRINE 10 MCG: 1 INJECTION PARENTERAL at 14:45

## 2020-03-06 RX ADMIN — Medication 200 MG: at 08:13

## 2020-03-06 RX ADMIN — ATORVASTATIN CALCIUM 20 MG: 20 TABLET, FILM COATED ORAL at 19:46

## 2020-03-06 RX ADMIN — NOREPINEPHRINE BITARTRATE 6.4 MCG: 1 INJECTION, SOLUTION, CONCENTRATE INTRAVENOUS at 14:44

## 2020-03-06 RX ADMIN — SUGAMMADEX 200 MG: 100 INJECTION, SOLUTION INTRAVENOUS at 16:28

## 2020-03-06 RX ADMIN — NOREPINEPHRINE BITARTRATE 6.4 MCG: 1 INJECTION, SOLUTION, CONCENTRATE INTRAVENOUS at 14:45

## 2020-03-06 RX ADMIN — AMPICILLIN SODIUM 2 G: 2 INJECTION, POWDER, FOR SOLUTION INTRAMUSCULAR; INTRAVENOUS at 08:12

## 2020-03-06 RX ADMIN — SODIUM CHLORIDE, POTASSIUM CHLORIDE, SODIUM LACTATE AND CALCIUM CHLORIDE: 600; 310; 30; 20 INJECTION, SOLUTION INTRAVENOUS at 08:11

## 2020-03-06 RX ADMIN — AMPICILLIN SODIUM 2 G: 2 INJECTION, POWDER, FOR SOLUTION INTRAMUSCULAR; INTRAVENOUS at 20:49

## 2020-03-06 RX ADMIN — NOREPINEPHRINE BITARTRATE 9.6 MCG: 1 INJECTION, SOLUTION, CONCENTRATE INTRAVENOUS at 15:06

## 2020-03-06 RX ADMIN — EPINEPHRINE 10 MCG: 1 INJECTION PARENTERAL at 14:47

## 2020-03-06 RX ADMIN — INSULIN ASPART 3 UNITS: 100 INJECTION, SOLUTION INTRAVENOUS; SUBCUTANEOUS at 02:21

## 2020-03-06 RX ADMIN — DICLOFENAC 4 G: 10 GEL TOPICAL at 08:15

## 2020-03-06 RX ADMIN — ONDANSETRON 4 MG: 2 INJECTION INTRAMUSCULAR; INTRAVENOUS at 16:07

## 2020-03-06 RX ADMIN — HYDROMORPHONE HYDROCHLORIDE 2 MG: 2 TABLET ORAL at 08:12

## 2020-03-06 RX ADMIN — AMIODARONE HYDROCHLORIDE 400 MG: 200 TABLET ORAL at 08:12

## 2020-03-06 RX ADMIN — FENTANYL CITRATE 100 MCG: 50 INJECTION, SOLUTION INTRAMUSCULAR; INTRAVENOUS at 16:35

## 2020-03-06 RX ADMIN — MAGNESIUM OXIDE 400 MG: 400 TABLET ORAL at 08:13

## 2020-03-06 ASSESSMENT — PAIN DESCRIPTION - DESCRIPTORS: DESCRIPTORS: SORE

## 2020-03-06 ASSESSMENT — ACTIVITIES OF DAILY LIVING (ADL)
ADLS_ACUITY_SCORE: 16

## 2020-03-06 ASSESSMENT — MIFFLIN-ST. JEOR: SCORE: 1714.63

## 2020-03-06 NOTE — PROGRESS NOTES
CARDIOTHORACIC SURGERY PROGRESS NOTE  03/06/2020      SUBJECTIVE:    ASSESSMENT:  Mr Eliseo Tanner is a 65yo M w/PMHx notable for chronic systolic heart failure, NICM, moderate CAD, HTN, ABHINAV on CPAP, DM2, CKDIII who is transferred to [facility] for evaluation and management of advanced heart failure with arrhythmia now s/p HM 3 on 2/18. 3/5 had hemothorax requiring a chest tube. Thoracic surgery will operate on him 3/5.      Changes:   Arterial line placement  OR today      PLAN:  Neuro:   - Neuro intact  - Pain; tylenol and oxycodone  - Depression; fluoxetine     CV:   - Hx of chronic systolic heart failure with EF 15-20%  - Atrial Fibrillation     - EP Consult, cardioversion 2/28. Amio 400 mg PO BID with taper, digoxin 125 mcg.    - HMIII LVAD     - ASA 81 mg, atorvastatin 20 mg.     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 Bivalirudin gtt discontinued stopped 3/2. Coumadin goal INR 2-3.     - INR 2.97, goal 2-3  -hold warfarin today for surgery   - HTN; hydralazine 100 q 8 hrs, lisinopril 5 mg  - VT; EP recommended ICD placement prior to discharge. EP working on time  -hemothorax              Chest tube placed, thoracic surgery 3/6         Pulm:   - Hx ABHINAV on CPAP   - Pulm toilet, IS, activity and deep breathing   - Supplemental O2 PRN to keep sats > 92%. Wean off as tolerated.      ID:   - WBC WNL, afebrile, no signs or symptoms of infection   - Bacteremia 2/13 and 2/15; ampicillin 2 grams q 6 hrs (14 days after removal of all central venous catheters (2/26 - 3/11))   - ID recommends surveillance cultures every week x 4 weeks after stopping all antibiotics (note on 2/25/20). Repeat cultures for surveillance drawn 3/3, NGTD.      GI / FEN:  - Reg diet, bowel regimen  - Hx GERD.      Renal / :   - CKD III; creatinine 1.23, adequate UOP.   - lasix 40 IV.      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      Endo:   - Hx Gout; allopurinol 200 mg daily   - T2DM; sliding scale insulin and lantus 10 units (change lantus 10 units to q  hs)       Disposition:  -ok to go to the floor following thoracic surgery     OBJECTIVE:   Vitals:  Temp:  [97.8  F (36.6  C)-98.7  F (37.1  C)] 98.7  F (37.1  C)  Pulse:  [] 99  Heart Rate:  [] 85  Resp:  [14-18] 14  BP: ()/() 106/85  MAP:  [91 mmHg-94 mmHg] 91 mmHg  Arterial Line BP: (130-133)/(60-72) 130/60  SpO2:  [92 %-99 %] 95 %    Physical Exam:  General: Alert, well-appearing in no acute distress.  HEENT: Normocephalic, atraumatic.  Chest wall: Symmetric thorax. No masses. Chest site c/d/i  Respiratory: Non-labored breathing. Lung sounds clear to auscultation bilaterally. Chest tube in place   Cardiovascular: Regular rate and rhythm.   Gastrointestinal: Abdomen soft, non-distended, non-tender to palpation.   Extremities: Moving all four extremities. No limb deformities. No pedal edema.   Skin: As noted above. No rashes or lesions appreciated.       I&O's:  I/O last 3 completed shifts:  In: 1287.5 [I.V.:1287.5]  Out: 945 [Urine:875; Chest Tube:70]    Ventilator Settings:  Resp: 14      Labs:  BMP  Recent Labs   Lab 03/06/20  0322 03/05/20  0434 03/04/20  0757 03/04/20  0602 03/03/20  0612    134  --  137 136   POTASSIUM 4.1 4.2 4.0 3.9 3.8   CHLORIDE 104 103  --  104 102   CALOS 8.0* 8.1*  --  8.3* 8.3*   CO2 30 28  --  26 27   BUN 16 17  --  20 24   CR 1.02 1.07  --  1.23 1.39*   * 121*  --  145* 132*     CBC  Recent Labs   Lab 03/06/20  0322 03/05/20  1229 03/05/20  0434 03/04/20  2222  03/04/20  0602   WBC 10.7  --  11.2* 11.8*  --  11.3*   RBC 2.78*  --  2.91* 3.01*  --  2.97*   HGB 8.0* 8.1* 8.4* 8.8*   < > 8.2*   HCT 26.5*  --  27.5* 28.4*  --  28.1*   MCV 95  --  95 94  --  95   MCH 28.8  --  28.9 29.2  --  27.6   MCHC 30.2*  --  30.5* 31.0*  --  29.2*   RDW 16.4*  --  16.7* 16.4*  --  17.8*     --  401 369  --  448    < > = values in this interval not displayed.     INR  Recent Labs   Lab 03/06/20  0322 03/05/20  0434 03/04/20  1552 03/04/20  0757   INR 1.45*  1.41* 1.84* 2.97*      Hepatic Panel   Recent Labs   Lab 03/04/20  0757   AST 20   ALT 21   ALKPHOS 106   BILITOTAL 0.3   ALBUMIN 1.8*     Glucose  Recent Labs   Lab 03/06/20  1049 03/06/20  0952 03/06/20  0646 03/06/20  0322 03/06/20  0220 03/05/20  2214 03/05/20  1913  03/05/20  0434  03/04/20  0602  03/03/20  0612  03/02/20  0619  03/01/20  0631   GLC  --   --   --  149*  --   --   --   --  121*  --  145*  --  132*  --  130*  --  129*   * 107* 88  --  200* 196* 115*   < >  --    < >  --    < >  --    < >  --    < >  --     < > = values in this interval not displayed.     Blood GasNo lab results found in last 7 days.    Imaging:   Recent Results (from the past 24 hour(s))   XR Chest Port 1 View    Narrative    Portable chest    INDICATION: Hemothorax with chest tube    COMPARISON: 3/5/2020    FINDINGS: Current image at 0114 hours again shows extensive  opacification throughout the right lung, consistent with pleural  effusion with her about blood component. Right chest tube appears  unchanged. LVAD again noted. Median sternotomy.      Impression    IMPRESSION: No significant change radiographically again with pleural  effusion, with without hemorrhagic component.    AIME GAY MD         Dispo:   - CVICU    Discussed with ICU staff.     Loreto Holcomb MD

## 2020-03-06 NOTE — OR NURSING
Notified from OR control desk that case is significantly delayed. Patient brought back to room on 4E, Jaime PETERSON updated.  Patient maintained NPO for possible return to OR tonight. LVAS coordinator Ángel updated at [phone]

## 2020-03-06 NOTE — OP NOTE
OPERATIVE DATE: 3/6/2020    PRE-OPERATIVE DIAGNOSIS:  1) Right hemothorax  Patient Active Problem List   Diagnosis     Acute on chronic systolic congestive heart failure (H)     Anxiety     CKD (chronic kidney disease) stage 3, GFR 30-59 ml/min (H)     Coronary artery disease involving native coronary artery of native heart without angina pectoris     GERD (gastroesophageal reflux disease)     Gout     Hyperlipidemia with target LDL less than 100     Nonischemic cardiomyopathy (H)     Non-nephrotic range proteinuria     ABHINAV on CPAP     Type 2 diabetes mellitus with stage 3 chronic kidney disease, without long-term current use of insulin (H)     Heart failure (H)     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)     Cardiac arrest (H)     POST-OPERATIVE DIAGNOSIS:  1) Right hemothorax  Patient Active Problem List   Diagnosis     Acute on chronic systolic congestive heart failure (H)     Anxiety     CKD (chronic kidney disease) stage 3, GFR 30-59 ml/min (H)     Coronary artery disease involving native coronary artery of native heart without angina pectoris     GERD (gastroesophageal reflux disease)     Gout     Hyperlipidemia with target LDL less than 100     Nonischemic cardiomyopathy (H)     Non-nephrotic range proteinuria     ABHINAV on CPAP     Type 2 diabetes mellitus with stage 3 chronic kidney disease, without long-term current use of insulin (H)     Heart failure (H)     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 WOLFGANG LEACH is a 64 year old male admitted with heart failure.  The patient had LVAD implant several days prior.  He had chest tube placement and subsequently developed right hemothorax.  He was  resuscitated and his coagulopathy was reversed.  I recommended to the family take back and chest washout.  Risks and benefits of the operation were explained to the patient and their family including, but not limited to, bleeding, infection, stroke and even death.  They understood these risks and agreed to proceed electively.    OPERATIVE REPORT:  The patient was transferred to the operating room and positioned in left lateral decubitus position on the OR table.  General anesthesia was initiated by the anesthesia team.  Double lumen endotracheal intubation and IV access was placed.  The patients right chest was clipped, prepped and draped in sterile fashion.  A pre-procedure time-out was performed confirming the correct patient, correct site and correct procedure.  A 12mm VATS port was placed in the 5th intercostal space anterior axillary line.  A second 12mm VATS port was placed in the 7th intercostal space posterior axillary line.  An access port incision was made in the 4th intercostal space mid axillary line.  We worked for several minutes to evacuate a large amount of retained hemothorax.  The chest was irrigated with warm saline.  No active bleeding was identified.  Two 32F pleural chest tubes were placed.  The right lung was ventilated.  The incision sites were closed in layers.  Sterile dressings were applied.    The patient was then transferred from the operating bed to PACU in stable condition.    All needle, sponge and instrument counts were correct at the end of the case.    Mac Jaramillo  Cardiothoracic Surgery  Pager: [phone]

## 2020-03-06 NOTE — PLAN OF CARE
Down to pre-op for thoracostomy washout. Unable to have procedure due to OR unavailable. Back up to floor at 1845. CT remains partially clotted off. No changes in respiratory status throughout day. Plan to go to OR when available. Will notify MD with any change in VS. Family updated at bedside.

## 2020-03-06 NOTE — PROGRESS NOTES
VAD Coordinator in OR throughout duration of VATS surgery. VAD parameters were monitored in collaboration with anesthesia. Report given to CHANDLER ApodacaU RN upon leaving the OR.       VAD parameters at START of surgery:  o Speed: 5500 rpm  o PI (or flow waveform peak/trough for HW): 6.5  o Flow: 3.5 lpm  o Power: 4.5 muhammad    VAD parameters at END of surgery:  o Speed: 5500 rpm  o PI (or flow waveform peak/trough for HW): 5.8  o Flow: 4 lpm  o Power: 4.3 muhammad    Speed adjustments made during OR: none    Flow range: 3-4 lpm    PI (or flow waveform peak/trough for HW) range: 1.9-10.1    Factors noted to cause a significant variation in VAD parameters: variation in MAP. Pt ranged from . Discussed with anesthesiology    Other significant events: n/a    Please page the VAD Coordinator on-call with any VAD related questions (* * * [phone], job code [id number] from an internal line).     Antonette Correia RN

## 2020-03-06 NOTE — PLAN OF CARE
Major shift events: Pre-op CHG bath x2 complete for thoracoscopic washout today. Pt remains in afib with rate in 80s-90s. BP WNL. LVAD #: flow 3.7-4, PI 3.6-4.7, power 4.2, speed 5500. LR going at 50/hr. CT output: 20 mL for entire shift.

## 2020-03-06 NOTE — PROGRESS NOTES
Case cancelled due to OR availability. Will be rescheduled tomorrow. Pt returned to 4E.    RN face to face time: 75 minutes

## 2020-03-06 NOTE — ANESTHESIA CARE TRANSFER NOTE
Patient: Eliseo Tanner    Procedure(s):  THORACOSCOPY WASHOUT POSSIBLE  THORACOTOMY    Diagnosis: Hemothorax [J94.2]  Diagnosis Additional Information: No value filed.    Anesthesia Type:   General     Note:  Airway :Nasal Cannula  Patient transferred to:PACU  Handoff Report: Identifed the Patient, Identified the Reponsible Provider, Reviewed the pertinent medical history, Discussed the surgical course, Reviewed Intra-OP anesthesia mangement and issues during anesthesia, Set expectations for post-procedure period and Allowed opportunity for questions and acknowledgement of understanding      Vitals: (Last set prior to Anesthesia Care Transfer)    CRNA VITALS  3/6/2020 1607 - 3/6/2020 1642      3/6/2020             Pulse:  116    ART BP:  114/82    ART Mean:  96    SpO2:  (!) 89 %    Resp Rate (observed):  22                Electronically Signed By: JUAN Thornton CRNA  March 6, 2020  4:42 PM

## 2020-03-07 ENCOUNTER — APPOINTMENT (OUTPATIENT)
Dept: GENERAL RADIOLOGY | Facility: CLINIC | Age: 65
DRG: 001 | End: 2020-03-07
Attending: INTERNAL MEDICINE
Payer: MEDICARE

## 2020-03-07 ENCOUNTER — APPOINTMENT (OUTPATIENT)
Dept: PHYSICAL THERAPY | Facility: CLINIC | Age: 65
DRG: 001 | End: 2020-03-07
Attending: INTERNAL MEDICINE
Payer: MEDICARE

## 2020-03-07 LAB
ABO + RH BLD: NORMAL
ABO + RH BLD: NORMAL
ANION GAP SERPL CALCULATED.3IONS-SCNC: 3 MMOL/L (ref 3–14)
APTT PPP: 39 SEC (ref 22–37)
APTT PPP: 63 SEC (ref 22–37)
BLD GP AB SCN SERPL QL: NORMAL
BLOOD BANK CMNT PATIENT-IMP: NORMAL
BUN SERPL-MCNC: 18 MG/DL (ref 7–30)
CALCIUM SERPL-MCNC: 7.7 MG/DL (ref 8.5–10.1)
CHLORIDE SERPL-SCNC: 103 MMOL/L (ref 94–109)
CO2 SERPL-SCNC: 29 MMOL/L (ref 20–32)
CREAT SERPL-MCNC: 1.03 MG/DL (ref 0.66–1.25)
ERYTHROCYTE [DISTWIDTH] IN BLOOD BY AUTOMATED COUNT: 16.6 % (ref 10–15)
FACT X ACT/NOR PPP CHRO: 57 % (ref 70–130)
GFR SERPL CREATININE-BSD FRML MDRD: 75 ML/MIN/{1.73_M2}
GLUCOSE BLDC GLUCOMTR-MCNC: 123 MG/DL (ref 70–99)
GLUCOSE BLDC GLUCOMTR-MCNC: 146 MG/DL (ref 70–99)
GLUCOSE BLDC GLUCOMTR-MCNC: 155 MG/DL (ref 70–99)
GLUCOSE BLDC GLUCOMTR-MCNC: 157 MG/DL (ref 70–99)
GLUCOSE BLDC GLUCOMTR-MCNC: 158 MG/DL (ref 70–99)
GLUCOSE SERPL-MCNC: 133 MG/DL (ref 70–99)
HCT VFR BLD AUTO: 26.8 % (ref 40–53)
HGB BLD-MCNC: 8.1 G/DL (ref 13.3–17.7)
INR PPP: 1.66 (ref 0.86–1.14)
MAGNESIUM SERPL-MCNC: 2 MG/DL (ref 1.6–2.3)
MCH RBC QN AUTO: 28.5 PG (ref 26.5–33)
MCHC RBC AUTO-ENTMCNC: 30.2 G/DL (ref 31.5–36.5)
MCV RBC AUTO: 94 FL (ref 78–100)
PLATELET # BLD AUTO: 409 10E9/L (ref 150–450)
POTASSIUM SERPL-SCNC: 4.8 MMOL/L (ref 3.4–5.3)
RBC # BLD AUTO: 2.84 10E12/L (ref 4.4–5.9)
SODIUM SERPL-SCNC: 135 MMOL/L (ref 133–144)
SPECIMEN EXP DATE BLD: NORMAL
WBC # BLD AUTO: 14.2 10E9/L (ref 4–11)

## 2020-03-07 PROCEDURE — 25000132 ZZH RX MED GY IP 250 OP 250 PS 637: Mod: GY | Performed by: PHYSICIAN ASSISTANT

## 2020-03-07 PROCEDURE — 86901 BLOOD TYPING SEROLOGIC RH(D): CPT | Performed by: THORACIC SURGERY (CARDIOTHORACIC VASCULAR SURGERY)

## 2020-03-07 PROCEDURE — 85610 PROTHROMBIN TIME: CPT | Performed by: PHYSICIAN ASSISTANT

## 2020-03-07 PROCEDURE — 97116 GAIT TRAINING THERAPY: CPT | Mod: GP

## 2020-03-07 PROCEDURE — 85260 CLOT FACTOR X STUART-POWER: CPT | Performed by: PHYSICIAN ASSISTANT

## 2020-03-07 PROCEDURE — 25000128 H RX IP 250 OP 636: Performed by: SURGERY

## 2020-03-07 PROCEDURE — 25000128 H RX IP 250 OP 636: Performed by: STUDENT IN AN ORGANIZED HEALTH CARE EDUCATION/TRAINING PROGRAM

## 2020-03-07 PROCEDURE — 85730 THROMBOPLASTIN TIME PARTIAL: CPT | Performed by: PHYSICIAN ASSISTANT

## 2020-03-07 PROCEDURE — 80048 BASIC METABOLIC PNL TOTAL CA: CPT | Performed by: PHYSICIAN ASSISTANT

## 2020-03-07 PROCEDURE — 25000128 H RX IP 250 OP 636: Performed by: PHYSICIAN ASSISTANT

## 2020-03-07 PROCEDURE — 97530 THERAPEUTIC ACTIVITIES: CPT | Mod: GP

## 2020-03-07 PROCEDURE — 85730 THROMBOPLASTIN TIME PARTIAL: CPT | Performed by: THORACIC SURGERY (CARDIOTHORACIC VASCULAR SURGERY)

## 2020-03-07 PROCEDURE — 25000132 ZZH RX MED GY IP 250 OP 250 PS 637: Mod: GY | Performed by: STUDENT IN AN ORGANIZED HEALTH CARE EDUCATION/TRAINING PROGRAM

## 2020-03-07 PROCEDURE — 86900 BLOOD TYPING SEROLOGIC ABO: CPT | Performed by: THORACIC SURGERY (CARDIOTHORACIC VASCULAR SURGERY)

## 2020-03-07 PROCEDURE — 00000146 ZZHCL STATISTIC GLUCOSE BY METER IP

## 2020-03-07 PROCEDURE — 25800030 ZZH RX IP 258 OP 636: Performed by: STUDENT IN AN ORGANIZED HEALTH CARE EDUCATION/TRAINING PROGRAM

## 2020-03-07 PROCEDURE — 86850 RBC ANTIBODY SCREEN: CPT | Performed by: THORACIC SURGERY (CARDIOTHORACIC VASCULAR SURGERY)

## 2020-03-07 PROCEDURE — 85027 COMPLETE CBC AUTOMATED: CPT | Performed by: PHYSICIAN ASSISTANT

## 2020-03-07 PROCEDURE — 83735 ASSAY OF MAGNESIUM: CPT | Performed by: PHYSICIAN ASSISTANT

## 2020-03-07 PROCEDURE — 40000014 ZZH STATISTIC ARTERIAL MONITORING DAILY

## 2020-03-07 PROCEDURE — 20000004 ZZH R&B ICU UMMC

## 2020-03-07 PROCEDURE — 71045 X-RAY EXAM CHEST 1 VIEW: CPT

## 2020-03-07 PROCEDURE — 25000132 ZZH RX MED GY IP 250 OP 250 PS 637: Mod: GY | Performed by: THORACIC SURGERY (CARDIOTHORACIC VASCULAR SURGERY)

## 2020-03-07 RX ORDER — HYDRALAZINE HYDROCHLORIDE 25 MG/1
25 TABLET, FILM COATED ORAL ONCE
Status: COMPLETED | OUTPATIENT
Start: 2020-03-07 | End: 2020-03-07

## 2020-03-07 RX ORDER — FUROSEMIDE 10 MG/ML
40 INJECTION INTRAMUSCULAR; INTRAVENOUS ONCE
Status: COMPLETED | OUTPATIENT
Start: 2020-03-07 | End: 2020-03-07

## 2020-03-07 RX ORDER — WARFARIN SODIUM 5 MG/1
5 TABLET ORAL
Status: COMPLETED | OUTPATIENT
Start: 2020-03-07 | End: 2020-03-07

## 2020-03-07 RX ORDER — HYDRALAZINE HYDROCHLORIDE 100 MG/1
100 TABLET, FILM COATED ORAL EVERY 8 HOURS SCHEDULED
Status: DISCONTINUED | OUTPATIENT
Start: 2020-03-07 | End: 2020-03-20 | Stop reason: HOSPADM

## 2020-03-07 RX ADMIN — MAGNESIUM OXIDE 400 MG: 400 TABLET ORAL at 08:42

## 2020-03-07 RX ADMIN — Medication 200 MG: at 08:43

## 2020-03-07 RX ADMIN — METHOCARBAMOL 500 MG: 500 TABLET, FILM COATED ORAL at 01:48

## 2020-03-07 RX ADMIN — HYDROMORPHONE HYDROCHLORIDE 2 MG: 2 TABLET ORAL at 09:18

## 2020-03-07 RX ADMIN — ASPIRIN 81 MG CHEWABLE TABLET 81 MG: 81 TABLET CHEWABLE at 08:42

## 2020-03-07 RX ADMIN — HYDRALAZINE HYDROCHLORIDE 25 MG: 25 TABLET, FILM COATED ORAL at 13:10

## 2020-03-07 RX ADMIN — Medication 0.4 MG: at 10:01

## 2020-03-07 RX ADMIN — MULTIPLE VITAMINS W/ MINERALS TAB 1 TABLET: TAB at 08:42

## 2020-03-07 RX ADMIN — PANTOPRAZOLE SODIUM 40 MG: 40 TABLET, DELAYED RELEASE ORAL at 08:42

## 2020-03-07 RX ADMIN — ATORVASTATIN CALCIUM 20 MG: 20 TABLET, FILM COATED ORAL at 20:14

## 2020-03-07 RX ADMIN — ACETAMINOPHEN 650 MG: 325 TABLET, FILM COATED ORAL at 13:23

## 2020-03-07 RX ADMIN — WARFARIN SODIUM 5 MG: 5 TABLET ORAL at 18:08

## 2020-03-07 RX ADMIN — ACETAMINOPHEN 650 MG: 325 TABLET, FILM COATED ORAL at 08:42

## 2020-03-07 RX ADMIN — HYDROMORPHONE HYDROCHLORIDE 2 MG: 2 TABLET ORAL at 13:23

## 2020-03-07 RX ADMIN — DICLOFENAC 4 G: 10 GEL TOPICAL at 08:43

## 2020-03-07 RX ADMIN — AMPICILLIN SODIUM 2 G: 2 INJECTION, POWDER, FOR SOLUTION INTRAMUSCULAR; INTRAVENOUS at 01:34

## 2020-03-07 RX ADMIN — INSULIN ASPART 1 UNITS: 100 INJECTION, SOLUTION INTRAVENOUS; SUBCUTANEOUS at 22:00

## 2020-03-07 RX ADMIN — HYDRALAZINE HYDROCHLORIDE 100 MG: 100 TABLET, FILM COATED ORAL at 21:59

## 2020-03-07 RX ADMIN — HYDRALAZINE HYDROCHLORIDE 100 MG: 100 TABLET, FILM COATED ORAL at 08:42

## 2020-03-07 RX ADMIN — DIGOXIN 125 MCG: 125 TABLET ORAL at 08:42

## 2020-03-07 RX ADMIN — INSULIN ASPART 1 UNITS: 100 INJECTION, SOLUTION INTRAVENOUS; SUBCUTANEOUS at 01:42

## 2020-03-07 RX ADMIN — BIVALIRUDIN 0.02 MG/KG/HR: 250 INJECTION INTRACAVERNOUS at 08:50

## 2020-03-07 RX ADMIN — AMPICILLIN SODIUM 2 G: 2 INJECTION, POWDER, FOR SOLUTION INTRAMUSCULAR; INTRAVENOUS at 20:14

## 2020-03-07 RX ADMIN — MAGNESIUM SULFATE 2 G: 2 INJECTION INTRAVENOUS at 04:39

## 2020-03-07 RX ADMIN — HYDRALAZINE HYDROCHLORIDE 100 MG: 100 TABLET, FILM COATED ORAL at 14:26

## 2020-03-07 RX ADMIN — HYDROMORPHONE HYDROCHLORIDE 2 MG: 2 TABLET ORAL at 17:49

## 2020-03-07 RX ADMIN — FUROSEMIDE 40 MG: 10 INJECTION, SOLUTION INTRAMUSCULAR; INTRAVENOUS at 18:08

## 2020-03-07 RX ADMIN — ALLOPURINOL 200 MG: 100 TABLET ORAL at 08:43

## 2020-03-07 RX ADMIN — ACETAMINOPHEN 650 MG: 325 TABLET, FILM COATED ORAL at 17:49

## 2020-03-07 RX ADMIN — METHOCARBAMOL 500 MG: 500 TABLET, FILM COATED ORAL at 20:20

## 2020-03-07 RX ADMIN — AMIODARONE HYDROCHLORIDE 400 MG: 200 TABLET ORAL at 08:42

## 2020-03-07 RX ADMIN — FUROSEMIDE 40 MG: 10 INJECTION, SOLUTION INTRAMUSCULAR; INTRAVENOUS at 14:26

## 2020-03-07 RX ADMIN — AMPICILLIN SODIUM 2 G: 2 INJECTION, POWDER, FOR SOLUTION INTRAMUSCULAR; INTRAVENOUS at 14:32

## 2020-03-07 RX ADMIN — AMPICILLIN SODIUM 2 G: 2 INJECTION, POWDER, FOR SOLUTION INTRAMUSCULAR; INTRAVENOUS at 08:43

## 2020-03-07 RX ADMIN — INSULIN ASPART 1 UNITS: 100 INJECTION, SOLUTION INTRAVENOUS; SUBCUTANEOUS at 17:06

## 2020-03-07 RX ADMIN — INSULIN ASPART 1 UNITS: 100 INJECTION, SOLUTION INTRAVENOUS; SUBCUTANEOUS at 12:44

## 2020-03-07 RX ADMIN — FLUOXETINE 20 MG: 20 CAPSULE ORAL at 08:42

## 2020-03-07 ASSESSMENT — MIFFLIN-ST. JEOR: SCORE: 1723.63

## 2020-03-07 ASSESSMENT — PAIN DESCRIPTION - DESCRIPTORS
DESCRIPTORS: SORE

## 2020-03-07 ASSESSMENT — ACTIVITIES OF DAILY LIVING (ADL)
ADLS_ACUITY_SCORE: 16

## 2020-03-07 NOTE — PROCEDURES
PROCEDURE NOTE - Arterial line placement   NAME: Eliseo Tanner   MRN: [id number]  : 1955     Date: 2020       Performed by: Loreto Holcomb     Attending: Dr. Mckinnon     Procedure: Arterial Line Placement(left radial)     Indications: hemothorax and LVAD      Sedation:  none    Findings: none new    Complications: none     Procedure:  A timeout was called to review the case and patient information. The patient was positioned sitting position. The patient's left lower arm was prepped and arterial line was placed in sterile fashion with mask gloves and sterile towels.. An ultrasound was used to visualize the left radial artery. Seldinger technique was used to place the catheter over wire. It was covered with tegaderm.

## 2020-03-07 NOTE — PROGRESS NOTES
Admitted/transferred from: PACU  Reason for admission/transfer: post op VATS  2 RN skin assessment: completed by Nate Waddell and Nikita Kay  Result of skin assessment and interventions/actions: WDL  Height, weight, drug calc weight: Done  Patient belongings (see Flowsheet)  MDRO education added to care planN/A  ?

## 2020-03-07 NOTE — PLAN OF CARE
Major Shift Events:  Chest tubes draining 20-30ml/hr, Dilaudid for incisional pain, voiding, CPAP overnight, mag replaced.    Plan: Possible floor orders today. Continue to monitor and notify MD of questions or concerns.    For vital signs and complete assessments, please see documentation flowsheets.

## 2020-03-07 NOTE — ANESTHESIA POSTPROCEDURE EVALUATION
Anesthesia POST Procedure Evaluation    Patient: Eliseo Tanner   MRN:     [id number] Gender:   male   Age:    64 year old :      1955        Preoperative Diagnosis: Hemothorax [J94.2]   Procedure(s):  THORACOSCOPY WASHOUT POSSIBLE  THORACOTOMY   Postop Comments: No value filed.     Anesthesia Type: General       Disposition: ICU            ICU Sign Out: Anesthesiologist/ICU physician sign out WAS performed   Postop Pain Control: Uneventful            Sign Out: Well controlled pain   PONV: No   Neuro/Psych: Uneventful            Sign Out: Acceptable/Baseline neuro status   Airway/Respiratory: Uneventful            Sign Out: Acceptable/Baseline resp. status   CV/Hemodynamics: Uneventful            Sign Out: Acceptable CV status   Other NRE: NONE   DID A NON-ROUTINE EVENT OCCUR? No         Last Anesthesia Record Vitals:  CRNA VITALS  3/6/2020 1607 - 3/6/2020 1707      3/6/2020             Resp Rate (observed):  14          Last PACU Vitals:  Vitals Value Taken Time   BP 84/66 3/6/2020  5:48 PM   Temp     Pulse 65 3/6/2020  5:48 PM   Resp     SpO2 89 % 3/6/2020  5:51 PM   Temp src     NIBP     Pulse     SpO2     Resp     Temp     Ht Rate     Temp 2     Vitals shown include unvalidated device data.      Electronically Signed By: Antonette Rendon MD, 2020, 6:02 PM

## 2020-03-07 NOTE — PLAN OF CARE
Major Shift Events: No changes made to VAD today. Hypertensive despite scheduled hydralazine, lasix x2  but remains net positive today. Ambulated in sosa with PT, up in chair most of afternoon. Dilaudid and Tylenol Q4H for incisional pain on right side.    Plan: Transfer orders present but no beds currently available. ART line to be removed before transfer.   For vital signs and complete assessments, please see documentation flowsheets.

## 2020-03-07 NOTE — PLAN OF CARE
"PT 4E / Discharge Planner PT   Patient plan for discharge: not discussed today  Current status: Patient requires mod A for bed mobility and min/mod A for sit<>stands depending on surface height. Reports pain at thoracotomy site. Ambulated 100' x2 with FWW + CGA, 1 seated rest break for fatigue. Patient on 2L NC, hypertensive at rest -120/80 (MAP 90s), BP down to 80s/60s (MAP 70s) with sitting/standing, recovers quickly and maintained MAP>65 throughout. Asymptomatic with exception of 1 episode of \"seeing stars\" during ambulation. LVAD #s stable throughout, PI 3.1-6.6.  Barriers to return to prior living situation: medical needs, pain, post-op precautions, current level of assist  Recommendations for discharge: ARU  Rationale for recommendations: Patient would benefit from intensive therapy to progress strength, activity tolerance and functional independence. Patient can/iwll tolerate 3 hours therapy/day.       Entered by: Yeyo Angulo 03/07/2020 1:30 PM      "

## 2020-03-07 NOTE — PROGRESS NOTES
CARDIOTHORACIC SURGERY PROGRESS NOTE  03/07/2020      SUBJECTIVE:    ASSESSMENT:  Mr Eliseo Tanner is a 65yo M w/PMHx notable for chronic systolic heart failure, NICM, moderate CAD, HTN, ABHINAV on CPAP, DM2, CKDIII who is transferred to [facility] for evaluation and management of advanced heart failure with arrhythmia now s/p HM 3 on 2/18. 3/5 had hemothorax requiring a chest tube. Thoracic surgery will operate on him 3/5.      Changes:   Transfer orders  -pharm to dose warfarin last night   -bival bridge today     PLAN:  Neuro:   - Neuro intact  - Pain; tylenol and oxycodone  - Depression; fluoxetine     CV:   - Hx of chronic systolic heart failure with EF 15-20%  - Atrial Fibrillation     - EP Consult, cardioversion 2/28. Amio 400 mg PO BID with taper, digoxin 125 mcg.    - HMIII LVAD     - ASA 81 mg, atorvastatin 20 mg.     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  Coumadin goal INR 2-3.     - INR 2.97, goal 2-3  -hold warfarin today for surgery   - HTN; hydralazine 100 q 8 hrs, lisinopril 5 mg  - VT; EP recommended ICD placement prior to discharge. EP working on time  -hemothorax              Chest tube placed   VATS 3/6, extensive clot burden removed   -pharm to dose warfarin 3/6, bival bridge today         Pulm:   - Hx ABHINAV on CPAP   - Pulm toilet, IS, activity and deep breathing   - Supplemental O2 PRN to keep sats > 92%. Wean off as tolerated.      ID:   - WBC WNL, afebrile, no signs or symptoms of infection   - Bacteremia 2/13 and 2/15; ampicillin 2 grams q 6 hrs (14 days after removal of all central venous catheters (2/26 - 3/11))   - ID recommends surveillance cultures every week x 4 weeks after stopping all antibiotics (note on 2/25/20). Repeat cultures for surveillance drawn 3/3, NGTD.      GI / FEN:  - Reg diet, bowel regimen  - Hx GERD.      Renal / :   - CKD III; creatinine 1.23, adequate UOP.   - lasix 40 IV.      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      Endo:   - Hx Gout; allopurinol 200 mg daily   - T2DM; sliding  scale insulin and lantus 10 units (change lantus 10 units to q hs)       Disposition:  -ok to go to the floor following thoracic surgery     OBJECTIVE:   Vitals:  Temp:  [97.5  F (36.4  C)-98.8  F (37.1  C)] 97.8  F (36.6  C)  Pulse:  [94-99] 99  Heart Rate:  [] 87  Resp:  [14-26] 20  BP: ()/() 82/66  MAP:  [81 mmHg-113 mmHg] 89 mmHg  Arterial Line BP: ()/() 100/76  SpO2:  [92 %-100 %] 98 %    Physical Exam:  General: Alert, well-appearing in no acute distress.  HEENT: Normocephalic, atraumatic.  Chest wall: Symmetric thorax. No masses. Chest site c/d/i  Respiratory: Non-labored breathing. Lung sounds clear to auscultation bilaterally. Chest tube in place   Cardiovascular: Regular rate and rhythm.   Gastrointestinal: Abdomen soft, non-distended, non-tender to palpation.   Extremities: Moving all four extremities. No limb deformities. No pedal edema.   Skin: As noted above. No rashes or lesions appreciated.       I&O's:  I/O last 3 completed shifts:  In: 1738 [P.O.:490; I.V.:1148]  Out: 745 [Urine:375; Blood:30; Chest Tube:340]    Ventilator Settings:  Resp: 20      Labs:  BMP  Recent Labs   Lab 03/07/20 0341 03/06/20 1615 03/06/20 0322 03/05/20  0434  03/04/20  0602    137 135 134  --  137   POTASSIUM 4.8 4.1 4.1 4.2   < > 3.9   CHLORIDE 103  --  104 103  --  104   CALOS 7.7*  --  8.0* 8.1*  --  8.3*   CO2 29  --  30 28  --  26   BUN 18  --  16 17  --  20   CR 1.03  --  1.02 1.07  --  1.23   * 123* 149* 121*  --  145*    < > = values in this interval not displayed.     CBC  Recent Labs   Lab 03/07/20 0341 03/06/20  1615 03/06/20  0322 03/05/20  1229 03/05/20  0434 03/04/20  2222   WBC 14.2*  --  10.7  --  11.2* 11.8*   RBC 2.84*  --  2.78*  --  2.91* 3.01*   HGB 8.1* 8.0* 8.0* 8.1* 8.4* 8.8*   HCT 26.8*  --  26.5*  --  27.5* 28.4*   MCV 94  --  95  --  95 94   MCH 28.5  --  28.8  --  28.9 29.2   MCHC 30.2*  --  30.2*  --  30.5* 31.0*   RDW 16.6*  --  16.4*  --  16.7*  16.4*     --  392  --  401 369     INR  Recent Labs   Lab 03/07/20  0341 03/06/20  0322 03/05/20  0434 03/04/20  1552   INR 1.66* 1.45* 1.41* 1.84*      Hepatic Panel   Recent Labs   Lab 03/04/20  0757   AST 20   ALT 21   ALKPHOS 106   BILITOTAL 0.3   ALBUMIN 1.8*     Glucose  Recent Labs   Lab 03/07/20  0341 03/07/20  0140 03/06/20  2123 03/06/20  1651 03/06/20  1615 03/06/20  1254 03/06/20  1049 03/06/20  0952  03/06/20  0322  03/05/20  0434  03/04/20  0602  03/03/20  0612   *  --   --   --  123*  --   --   --   --  149*  --  121*  --  145*  --  132*   BGM  --  146* 194* 136*  --  133* 118* 107*   < >  --    < >  --    < >  --    < >  --     < > = values in this interval not displayed.     Blood Gas  Recent Labs   Lab 03/06/20  1615 03/06/20  1550   PH 7.41 7.39   PCO2 42 45   PO2 96 49*   HCO3 26 27   O2PER 65 65       Imaging:   Recent Results (from the past 24 hour(s))   XR Chest Port 1 View    Narrative    Exam: XR CHEST PORT 1 VW, 3/6/2020 5:55 PM    Indication: s/p R VATS    Comparison: 3/6/2020    Findings:   Significant reduction in the opacity over the right lung. There are  now 2 right chest tubes. One apically directed and the other one at  the right lung base. Fluid is seen extending up into the fissure and  over the apex of the lung. Significant improvement in aeration of the  right lung.    Silhouetting of the left hemidiaphragm suggests atelectasis. With or  without small effusion. Perihilar opacity suggests mild edema.    Heart upper limits of normal in size. Left ventricular assist device  is again noted.      Impression    Impression:   1. Postoperative changes, 2 new chest tubes. Significant reduction in  the opacity over the right lung with significant improvement in the  aeration of the right lung.  2. No pneumothorax.   3. New left lower lobe atelectasis.    DEVON CHARLES MD   XR Chest Port 1 View    Narrative    EXAM: XR CHEST PORT 1 VW  3/7/2020 2:07 AM     HISTORY: s/p R  VATS       COMPARISON: 3/6/2020    FINDINGS:   Postoperative changes of the mediastinum and LVAD are stable. Right  apically and basally directed chest tube. Tubing overlying the right  midlung field. The trachea is midline. The cardiomediastinal  silhouette and pulmonary vasculature are unchanged. Low lung volumes  are stable. No convincing pneumothorax although the right apex is  obscured by overlying lines/tubing. Stable residual small right  hemothorax/pleural effusion. The right costophrenic angle is not  included. Trace left pleural effusion. Slightly increased right  midlung opacity may represent fluid in the fissure. The included  osseous structures, soft tissues and upper abdomen and are within  normal limits.          Impression    IMPRESSION:   1. Stable postoperative changes of thoracotomy without convincing  pneumothorax.  2. Slightly increased right midlung opacity may represent fluid in the  fissure. Otherwise grossly stable small right residual  hemothorax/pleural effusion. Trace left pleural effusion.    I have personally reviewed the examination and initial interpretation  and I agree with the findings.    DEION FLANAGAN MD         Dispo:   - CVICU    Discussed with ICU staff.     Loreto Holcomb MD

## 2020-03-07 NOTE — PROGRESS NOTES
Advanced Heart Failure Consult Progress Note  Eliseo Tanner MRN: [id number]  Age: 64 year old, : 1955  Date: 2020                    Assessment and Plan:     Mr Eliseo Tanner is a 65yo M w/PMHx notable for chronic systolic heart failure, NICM, moderate CAD, HTN, ABHINAV on CPAP, DM2, CKDIII who is transferred to [facility] for evaluation and management of advanced heart failure with arrhythmia now s/p HM 3 on .    Today's plan (3/5):  -Continue hydral 100mg TID for afterload reduction  - Would diurese IV lasix 40mg with goal of net 1L      Moderate CAD  Severe Mitral regurgitation  Chronic nonischemic systolic heart failure w/ reduced EF (15-20%) and exacerbation  New atrial fibrillation  NYHA Class III. Echo 2020: LVEF 15-20%; LVEDd 5.9 cm; 4+ MR. C 2019 w/ nonobstructive CAD w/ severe branch disease. Presented with increased wt gain, SOB, LE edema concerning for HF exacerbation, initially concerning for cardiogenic shock. Admitted to [facility] for further evaluation regarding need for advanced HF therapies. Patient is now s/p HM III placement on  with early post-op course complicated by bleeding that is slowing down as of  AM. Patient is persistently tachycardic to 130-140 with a wide-complex mostly-regular tachycardia. ECG obtained difficult to interpret given LVAD artifact, but afib w/ RVR and aberrancy vs. nursing home vs. AT vs. Slow VT. S/p DCCV on  back into sinus rhythm.  -Continue ASA 81, atorvastatin 20 mg  -Continue coumadin with goal INR 2-3 / CFX goal 20-40 (while on Bival)  -Agree with continuing Hydral 100mg TID for afterload reduction   -LVAD speed at 5500 RPM    #Retrosternal hematoma:  As seen on  CT chest at 5.1 cm. S/P chest tube per CVTS on 3/5.    Proteus and Enterococcus bacteremia  Organisms growing from 2/2 blood cultures from . Blood cultured from  NGTD and 2/15 growing 1/2 S.epi, likely contaminant per ID.  ID consulted,  "appreciate help. Will decide on final duration of abx.  -daily blood cultures until negative  -Zosyn (2/13-2/14)  -Tobramycin (2/13-2/14)  -Vancomycin (2/13-2/17  -Meropenem (2/14-2/15)  -Ceftriaxone (2/16-2/28)  -Ampicillin (2/16-3/11) (14 days after swan removal on 2/26)    #Thrombocytopenia:  Concern for HIT given timing with respect to heparin exposure. HIT positive DAVINA negative. Antibody is now negative x2, thus no further indication for PLAX  -Heme consulted  -Holding Bivalirudin gtt after LVAD surgery,goal chromogenic level 20-40 (given hemothorax)    VFib requiring shock  Shocked x 3 prior to transfer, loaded with amio. No known prior history. Suspect related to underlying HF.   -Keep K>4, Mg>2  -Amio 400 mg PO QD  -Needs ICD for secondary prevention, will need to discuss with EP     Atrial flutter   S/p cardioversion 2/28/20   - Holding coumadin for anticoagulation, bival bridge given hemothorax      Sonia Portillo MD  Cardiology Fellow  PGY4                Subjective/Interval Events     - MAPS: 100s this morning  - Weight: 215 lbs---> 217lbs           Objective     /86   Pulse 99   Temp 98.2  F (36.8  C) (Oral)   Resp 18   Ht 1.702 m (5' 7\")   Wt 98.5 kg (217 lb 2.5 oz)   SpO2 98%   BMI 34.01 kg/m    Temp:  [97.5  F (36.4  C)-98.8  F (37.1  C)] 98.2  F (36.8  C)  Heart Rate:  [] 102  Resp:  [18-22] 18  BP: ()/(66-86) 100/86  MAP:  [81 mmHg-114 mmHg] 101 mmHg  Arterial Line BP: ()/() 120/82  SpO2:  [88 %-100 %] 98 %  Wt Readings from Last 2 Encounters:   03/07/20 98.5 kg (217 lb 2.5 oz)     I/O last 3 completed shifts:  In: 1438.04 [P.O.:440; I.V.:898.04]  Out: 1095 [Urine:625; Blood:30; Chest Tube:440]      Gen: No acute distress  HEENT: NC/AT, +JVD   PULM/THORAX: CTAB  CV: normal rate, regular rhythm, normal S1 and S2, LVAD hum  ABD: Soft, NTND, bowel sounds present, no masses  EXT: WWP. 1+ pitting LE edema, clubbing or cyanosis.  NEURO: A&Ox3                Data: "     Recent Results (from the past 24 hour(s))   Glucose by meter    Collection Time: 03/06/20  9:23 PM   Result Value Ref Range    Glucose 194 (H) 70 - 99 mg/dL   Glucose by meter    Collection Time: 03/07/20  1:40 AM   Result Value Ref Range    Glucose 146 (H) 70 - 99 mg/dL   Factor 10 chromogenic    Collection Time: 03/07/20  3:41 AM   Result Value Ref Range    Chromogenic Factor 10 57 (L) 70 - 130 %   Basic metabolic panel    Collection Time: 03/07/20  3:41 AM   Result Value Ref Range    Sodium 135 133 - 144 mmol/L    Potassium 4.8 3.4 - 5.3 mmol/L    Chloride 103 94 - 109 mmol/L    Carbon Dioxide 29 20 - 32 mmol/L    Anion Gap 3 3 - 14 mmol/L    Glucose 133 (H) 70 - 99 mg/dL    Urea Nitrogen 18 7 - 30 mg/dL    Creatinine 1.03 0.66 - 1.25 mg/dL    GFR Estimate 75 >60 mL/min/[1.73_m2]    GFR Estimate If Black 87 >60 mL/min/[1.73_m2]    Calcium 7.7 (L) 8.5 - 10.1 mg/dL   CBC with platelets    Collection Time: 03/07/20  3:41 AM   Result Value Ref Range    WBC 14.2 (H) 4.0 - 11.0 10e9/L    RBC Count 2.84 (L) 4.4 - 5.9 10e12/L    Hemoglobin 8.1 (L) 13.3 - 17.7 g/dL    Hematocrit 26.8 (L) 40.0 - 53.0 %    MCV 94 78 - 100 fl    MCH 28.5 26.5 - 33.0 pg    MCHC 30.2 (L) 31.5 - 36.5 g/dL    RDW 16.6 (H) 10.0 - 15.0 %    Platelet Count 409 150 - 450 10e9/L   Partial thromboplastin time    Collection Time: 03/07/20  3:41 AM   Result Value Ref Range    PTT 39 (H) 22 - 37 sec   Magnesium    Collection Time: 03/07/20  3:41 AM   Result Value Ref Range    Magnesium 2.0 1.6 - 2.3 mg/dL   INR    Collection Time: 03/07/20  3:41 AM   Result Value Ref Range    INR 1.66 (H) 0.86 - 1.14   ABO/Rh type and screen    Collection Time: 03/07/20  3:41 AM   Result Value Ref Range    ABO A     RH(D) Pos     Antibody Screen Neg     Test Valid Only At          [facility]    Specimen Expires 03/10/2020    Glucose by meter    Collection Time: 03/07/20  9:06 AM   Result Value Ref Range    Glucose 123 (H)  70 - 99 mg/dL   Glucose by meter    Collection Time: 20 11:43 AM   Result Value Ref Range    Glucose 155 (H) 70 - 99 mg/dL   PTT (AM Draw)    Collection Time: 20 12:41 PM   Result Value Ref Range    PTT 63 (H) 22 - 37 sec   Glucose by meter    Collection Time: 20  5:05 PM   Result Value Ref Range    Glucose 157 (H) 70 - 99 mg/dL       Recent Results (from the past 24 hour(s))   Echocardiogram Complete    Narrative    [id number]  [id number]  [id number]  [id number]^MATT^NAHUN^JIM           [facility],[place]  Echocardiography Laboratory  [address] [place] [address]     Name: WOLFGANG LEACH  MRN: [id number]  : 1955  Study Date: 2020 10:06 AM  Age: 64 yrs  Gender: Male  Patient Location: [facility]  Reason For Study: Heart Failure  Ordering Physician: NAHUN GUTIERREZ  Referring Physician: SELF, REFERRED  Performed By: Yvonne Adan RDCS     BSA: 2.2 m2  Height: 67 in  Weight: 244 lb  HR: 103  BP: 72/52 mmHg  _____________________________________________________________________________  __        Procedure  Complete Portable Echo Adult. Contrast Optison. Optison (NDC #7927-3635-21)  given intravenously. Patient was given 6 ml mixture of 3 ml Optison and 6 ml  saline. 3 ml wasted.  _____________________________________________________________________________  __        Interpretation Summary  Left ventricular size is normal.  LVEF 20% based on biplane 2D tracing.  Mild to moderate right ventricular dilation is present.  Global right ventricular function is moderately reduced.  Pulmonary artery systolic pressure is normal.  Dilation of the inferior vena cava is present with abnormal respiratory  variation in diameter.  _____________________________________________________________________________  __        Left Ventricle  Left ventricular size is normal. LVEF 20% based on biplane 2D tracing. Left  ventricular wall thickness is normal. Diastolic function not  assessed due to  frequent ectopy. Abnormal septal motion consistent with left bundle branch  block is present.     Right Ventricle  Mild to moderate right ventricular dilation is present. Global right  ventricular function is moderately reduced.     Atria  Mild biatrial enlargement is present.     Mitral Valve  Moderate mitral insufficiency is present.        Aortic Valve  Aortic valve is normal in structure and function.     Tricuspid Valve  Moderate tricuspid insufficiency is present. The right ventricular systolic  pressure is approximated at 24.4 mmHg plus the right atrial pressure.  Pulmonary artery systolic pressure is normal.     Pulmonic Valve  The pulmonic valve cannot be assessed. Mild pulmonic insufficiency is present.     Vessels  The aorta root is normal. The pulmonary artery cannot be assessed. Dilation of  the inferior vena cava is present with abnormal respiratory variation in  diameter.     Compared to Previous Study  Previous study not available for comparison.     _____________________________________________________________________________  __  MMode/2D Measurements & Calculations  IVSd: 0.61 cm  LVIDd: 4.7 cm  LVIDs: 3.7 cm  LVPWd: 0.75 cm  FS: 21.7 %  LV mass(C)d: 99.1 grams  LV mass(C)dI: 45.0 grams/m2     Ao root diam: 2.9 cm  asc Aorta Diam: 2.5 cm  LVOT diam: 1.9 cm  LVOT area: 2.8 cm2     EF(MOD-bp): 21.5 %  LA Volume (BP): 84.8 ml  LA Volume Index (BP): 38.5 ml/m2  RWT: 0.32        Doppler Measurements & Calculations  PA acc time: 0.09 sec     PI end-d saumya: 154.0 cm/sec  TR max saumya: 247.0 cm/sec  TR max P.4 mmHg     _____________________________________________________________________________  __           Report approved by: Graciela Tomlinson 2020 12:05 PM      XR Chest Port 1 View    Narrative    XR CHEST PORT 1 VW  2020 4:21 PM      HISTORY: SG Placement    COMPARISON: None available    FINDINGS: Single AP chest radiograph. [place]-Chucky catheter tip is in the  proximal right  main pulmonary artery. Right central line tip and right  upper extremity PICC line tip at the lower SVC. Trachea is midline,  cardiomegaly. Bilateral perihilar streaky opacities. Mild increased  interstitial opacities in the right lung with ill-defined pulmonary  vascularity. No pneumothorax or pleural effusion. No acute osseous or  abdominal abnormality. Elevated left hemidiaphragm.      Impression    IMPRESSION:  1. [place]-Chucky catheter tip at the proximal right main pulmonary artery.  2. Cardiomegaly with mild pulmonary edema, especially on the right.    I have personally reviewed the examination and initial interpretation  and I agree with the findings.    DONG SOLORIO MD   Cardiac Catheterization    Narrative      Successful insertion of a IABP in the RFA                Medications

## 2020-03-07 NOTE — PLAN OF CARE
Major Shift Events:  VATS procedure performed, settled in ICU  Plan: continue to monitor and manage pain  For vital signs and complete assessments, please see documentation flowsheets.

## 2020-03-08 ENCOUNTER — APPOINTMENT (OUTPATIENT)
Dept: OCCUPATIONAL THERAPY | Facility: CLINIC | Age: 65
DRG: 001 | End: 2020-03-08
Attending: INTERNAL MEDICINE
Payer: MEDICARE

## 2020-03-08 ENCOUNTER — APPOINTMENT (OUTPATIENT)
Dept: GENERAL RADIOLOGY | Facility: CLINIC | Age: 65
DRG: 001 | End: 2020-03-08
Attending: INTERNAL MEDICINE
Payer: MEDICARE

## 2020-03-08 LAB
ANION GAP SERPL CALCULATED.3IONS-SCNC: 6 MMOL/L (ref 3–14)
APTT PPP: 80 SEC (ref 22–37)
BUN SERPL-MCNC: 25 MG/DL (ref 7–30)
BUN SERPL-MCNC: NORMAL MG/DL (ref 7–30)
CALCIUM SERPL-MCNC: 7.8 MG/DL (ref 8.5–10.1)
CHLORIDE SERPL-SCNC: 100 MMOL/L (ref 94–109)
CO2 SERPL-SCNC: 29 MMOL/L (ref 20–32)
CREAT SERPL-MCNC: 1.11 MG/DL (ref 0.66–1.25)
CREAT SERPL-MCNC: NORMAL MG/DL (ref 0.66–1.25)
ERYTHROCYTE [DISTWIDTH] IN BLOOD BY AUTOMATED COUNT: 16.4 % (ref 10–15)
FACT X ACT/NOR PPP CHRO: 45 % (ref 70–130)
GFR SERPL CREATININE-BSD FRML MDRD: 68 ML/MIN/{1.73_M2}
GFR SERPL CREATININE-BSD FRML MDRD: NORMAL ML/MIN/{1.73_M2}
GLUCOSE BLDC GLUCOMTR-MCNC: 141 MG/DL (ref 70–99)
GLUCOSE BLDC GLUCOMTR-MCNC: 144 MG/DL (ref 70–99)
GLUCOSE BLDC GLUCOMTR-MCNC: 148 MG/DL (ref 70–99)
GLUCOSE BLDC GLUCOMTR-MCNC: 171 MG/DL (ref 70–99)
GLUCOSE BLDC GLUCOMTR-MCNC: 181 MG/DL (ref 70–99)
GLUCOSE BLDC GLUCOMTR-MCNC: 189 MG/DL (ref 70–99)
GLUCOSE SERPL-MCNC: 156 MG/DL (ref 70–99)
HCT VFR BLD AUTO: 24.7 % (ref 40–53)
HGB BLD-MCNC: 7.4 G/DL (ref 13.3–17.7)
INR PPP: 2.27 (ref 0.86–1.14)
MAGNESIUM SERPL-MCNC: 2 MG/DL (ref 1.6–2.3)
MCH RBC QN AUTO: 28.6 PG (ref 26.5–33)
MCHC RBC AUTO-ENTMCNC: 30 G/DL (ref 31.5–36.5)
MCV RBC AUTO: 95 FL (ref 78–100)
PLATELET # BLD AUTO: 431 10E9/L (ref 150–450)
POTASSIUM SERPL-SCNC: 3.8 MMOL/L (ref 3.4–5.3)
RBC # BLD AUTO: 2.59 10E12/L (ref 4.4–5.9)
SODIUM SERPL-SCNC: 135 MMOL/L (ref 133–144)
WBC # BLD AUTO: 12.2 10E9/L (ref 4–11)

## 2020-03-08 PROCEDURE — 25000132 ZZH RX MED GY IP 250 OP 250 PS 637: Mod: GY | Performed by: PHYSICIAN ASSISTANT

## 2020-03-08 PROCEDURE — 25000132 ZZH RX MED GY IP 250 OP 250 PS 637: Mod: GY | Performed by: THORACIC SURGERY (CARDIOTHORACIC VASCULAR SURGERY)

## 2020-03-08 PROCEDURE — 97530 THERAPEUTIC ACTIVITIES: CPT | Mod: GO | Performed by: OCCUPATIONAL THERAPIST

## 2020-03-08 PROCEDURE — 85610 PROTHROMBIN TIME: CPT | Performed by: THORACIC SURGERY (CARDIOTHORACIC VASCULAR SURGERY)

## 2020-03-08 PROCEDURE — 85730 THROMBOPLASTIN TIME PARTIAL: CPT | Performed by: THORACIC SURGERY (CARDIOTHORACIC VASCULAR SURGERY)

## 2020-03-08 PROCEDURE — 25000128 H RX IP 250 OP 636: Performed by: STUDENT IN AN ORGANIZED HEALTH CARE EDUCATION/TRAINING PROGRAM

## 2020-03-08 PROCEDURE — 80048 BASIC METABOLIC PNL TOTAL CA: CPT | Performed by: THORACIC SURGERY (CARDIOTHORACIC VASCULAR SURGERY)

## 2020-03-08 PROCEDURE — 25000128 H RX IP 250 OP 636: Performed by: PHYSICIAN ASSISTANT

## 2020-03-08 PROCEDURE — 97110 THERAPEUTIC EXERCISES: CPT | Mod: GO | Performed by: OCCUPATIONAL THERAPIST

## 2020-03-08 PROCEDURE — 97535 SELF CARE MNGMENT TRAINING: CPT | Mod: GO | Performed by: OCCUPATIONAL THERAPIST

## 2020-03-08 PROCEDURE — 99233 SBSQ HOSP IP/OBS HIGH 50: CPT | Mod: GC | Performed by: INTERNAL MEDICINE

## 2020-03-08 PROCEDURE — 25800030 ZZH RX IP 258 OP 636: Performed by: STUDENT IN AN ORGANIZED HEALTH CARE EDUCATION/TRAINING PROGRAM

## 2020-03-08 PROCEDURE — 36415 COLL VENOUS BLD VENIPUNCTURE: CPT | Performed by: THORACIC SURGERY (CARDIOTHORACIC VASCULAR SURGERY)

## 2020-03-08 PROCEDURE — 00000146 ZZHCL STATISTIC GLUCOSE BY METER IP

## 2020-03-08 PROCEDURE — 71045 X-RAY EXAM CHEST 1 VIEW: CPT

## 2020-03-08 PROCEDURE — 85260 CLOT FACTOR X STUART-POWER: CPT | Performed by: THORACIC SURGERY (CARDIOTHORACIC VASCULAR SURGERY)

## 2020-03-08 PROCEDURE — 83735 ASSAY OF MAGNESIUM: CPT | Performed by: THORACIC SURGERY (CARDIOTHORACIC VASCULAR SURGERY)

## 2020-03-08 PROCEDURE — 20000004 ZZH R&B ICU UMMC

## 2020-03-08 PROCEDURE — 85027 COMPLETE CBC AUTOMATED: CPT | Performed by: THORACIC SURGERY (CARDIOTHORACIC VASCULAR SURGERY)

## 2020-03-08 PROCEDURE — 25000132 ZZH RX MED GY IP 250 OP 250 PS 637: Mod: GY | Performed by: STUDENT IN AN ORGANIZED HEALTH CARE EDUCATION/TRAINING PROGRAM

## 2020-03-08 RX ORDER — FUROSEMIDE 10 MG/ML
40 INJECTION INTRAMUSCULAR; INTRAVENOUS ONCE
Status: COMPLETED | OUTPATIENT
Start: 2020-03-08 | End: 2020-03-08

## 2020-03-08 RX ORDER — LIDOCAINE 4 G/G
2 PATCH TOPICAL
Status: DISCONTINUED | OUTPATIENT
Start: 2020-03-08 | End: 2020-03-20 | Stop reason: HOSPADM

## 2020-03-08 RX ORDER — GINSENG 100 MG
CAPSULE ORAL 3 TIMES DAILY PRN
Status: DISCONTINUED | OUTPATIENT
Start: 2020-03-08 | End: 2020-03-20 | Stop reason: HOSPADM

## 2020-03-08 RX ORDER — WARFARIN SODIUM 3 MG/1
3 TABLET ORAL
Status: COMPLETED | OUTPATIENT
Start: 2020-03-08 | End: 2020-03-08

## 2020-03-08 RX ADMIN — INSULIN ASPART 2 UNITS: 100 INJECTION, SOLUTION INTRAVENOUS; SUBCUTANEOUS at 03:00

## 2020-03-08 RX ADMIN — INSULIN ASPART 1 UNITS: 100 INJECTION, SOLUTION INTRAVENOUS; SUBCUTANEOUS at 22:42

## 2020-03-08 RX ADMIN — Medication 200 MG: at 09:00

## 2020-03-08 RX ADMIN — MAGNESIUM SULFATE 2 G: 2 INJECTION INTRAVENOUS at 06:53

## 2020-03-08 RX ADMIN — INSULIN ASPART 2 UNITS: 100 INJECTION, SOLUTION INTRAVENOUS; SUBCUTANEOUS at 12:07

## 2020-03-08 RX ADMIN — WARFARIN SODIUM 3 MG: 3 TABLET ORAL at 17:41

## 2020-03-08 RX ADMIN — AMPICILLIN SODIUM 2 G: 2 INJECTION, POWDER, FOR SOLUTION INTRAMUSCULAR; INTRAVENOUS at 03:00

## 2020-03-08 RX ADMIN — HYDROMORPHONE HYDROCHLORIDE 2 MG: 2 TABLET ORAL at 18:33

## 2020-03-08 RX ADMIN — PANTOPRAZOLE SODIUM 40 MG: 40 TABLET, DELAYED RELEASE ORAL at 09:00

## 2020-03-08 RX ADMIN — MULTIPLE VITAMINS W/ MINERALS TAB 1 TABLET: TAB at 09:00

## 2020-03-08 RX ADMIN — FUROSEMIDE 40 MG: 10 INJECTION, SOLUTION INTRAMUSCULAR; INTRAVENOUS at 08:59

## 2020-03-08 RX ADMIN — HYDROMORPHONE HYDROCHLORIDE 2 MG: 2 TABLET ORAL at 10:23

## 2020-03-08 RX ADMIN — AMPICILLIN SODIUM 2 G: 2 INJECTION, POWDER, FOR SOLUTION INTRAMUSCULAR; INTRAVENOUS at 21:05

## 2020-03-08 RX ADMIN — MAGNESIUM OXIDE 400 MG: 400 TABLET ORAL at 09:00

## 2020-03-08 RX ADMIN — METHOCARBAMOL 500 MG: 500 TABLET, FILM COATED ORAL at 20:49

## 2020-03-08 RX ADMIN — POTASSIUM CHLORIDE 20 MEQ: 750 TABLET, EXTENDED RELEASE ORAL at 06:53

## 2020-03-08 RX ADMIN — DIGOXIN 125 MCG: 125 TABLET ORAL at 09:00

## 2020-03-08 RX ADMIN — HYDRALAZINE HYDROCHLORIDE 100 MG: 100 TABLET, FILM COATED ORAL at 05:46

## 2020-03-08 RX ADMIN — AMIODARONE HYDROCHLORIDE 200 MG: 200 TABLET ORAL at 09:00

## 2020-03-08 RX ADMIN — FLUOXETINE 20 MG: 20 CAPSULE ORAL at 09:00

## 2020-03-08 RX ADMIN — INSULIN ASPART 2 UNITS: 100 INJECTION, SOLUTION INTRAVENOUS; SUBCUTANEOUS at 17:02

## 2020-03-08 RX ADMIN — HYDROMORPHONE HYDROCHLORIDE 2 MG: 2 TABLET ORAL at 22:41

## 2020-03-08 RX ADMIN — QUETIAPINE FUMARATE 25 MG: 25 TABLET ORAL at 22:39

## 2020-03-08 RX ADMIN — AMPICILLIN SODIUM 2 G: 2 INJECTION, POWDER, FOR SOLUTION INTRAMUSCULAR; INTRAVENOUS at 13:46

## 2020-03-08 RX ADMIN — ASPIRIN 81 MG CHEWABLE TABLET 81 MG: 81 TABLET CHEWABLE at 09:00

## 2020-03-08 RX ADMIN — ATORVASTATIN CALCIUM 20 MG: 20 TABLET, FILM COATED ORAL at 22:38

## 2020-03-08 RX ADMIN — HYDROMORPHONE HYDROCHLORIDE 2 MG: 2 TABLET ORAL at 05:46

## 2020-03-08 RX ADMIN — BIVALIRUDIN 0.02 MG/KG/HR: 250 INJECTION INTRACAVERNOUS at 00:08

## 2020-03-08 RX ADMIN — SENNOSIDES AND DOCUSATE SODIUM 2 TABLET: 8.6; 5 TABLET ORAL at 09:00

## 2020-03-08 RX ADMIN — HYDRALAZINE HYDROCHLORIDE 100 MG: 100 TABLET, FILM COATED ORAL at 13:46

## 2020-03-08 RX ADMIN — ALLOPURINOL 200 MG: 100 TABLET ORAL at 09:00

## 2020-03-08 RX ADMIN — AMPICILLIN SODIUM 2 G: 2 INJECTION, POWDER, FOR SOLUTION INTRAMUSCULAR; INTRAVENOUS at 09:01

## 2020-03-08 RX ADMIN — INSULIN ASPART 1 UNITS: 100 INJECTION, SOLUTION INTRAVENOUS; SUBCUTANEOUS at 05:46

## 2020-03-08 RX ADMIN — HYDROMORPHONE HYDROCHLORIDE 2 MG: 2 TABLET ORAL at 14:37

## 2020-03-08 RX ADMIN — HYDRALAZINE HYDROCHLORIDE 100 MG: 100 TABLET, FILM COATED ORAL at 22:39

## 2020-03-08 ASSESSMENT — PAIN DESCRIPTION - DESCRIPTORS
DESCRIPTORS: SORE

## 2020-03-08 ASSESSMENT — ACTIVITIES OF DAILY LIVING (ADL)
ADLS_ACUITY_SCORE: 16

## 2020-03-08 ASSESSMENT — MIFFLIN-ST. JEOR: SCORE: 1716.63

## 2020-03-08 NOTE — PLAN OF CARE
Discharge Planner OT   Patient plan for discharge: not stated  Current status: OT: Faciliated in room and in sosa ambulation to increase ind in ADLS/IADLS. Pt ambulated 200 ft w/ no rest breaks and CGA pushing IV pole throughout. Pt's VSS throughout on 2L NC. Pt's HR mid-low  120s throughout. OT educated pt on signs/symptoms of activity intolerance throughout and modulating rest breaks prn. Pt agreeable to all education and training throughout.   Barriers to return to prior living situation: medical status  Recommendations for discharge: ARU  Rationale for recommendations: Pt is motivated, has a good support system and is making good progress with therapy. Pt is able to tolerate 3 hours of therapy per day.        Entered by: Diamond Santo 03/08/2020 11:20 AM

## 2020-03-08 NOTE — PROGRESS NOTES
Advanced Heart Failure Consult Progress Note  Eliseo Tanner MRN: [id number]  Age: 64 year old, : 1955  Date: 2020                    Assessment and Plan:     Mr Eliseo Tanner is a 63yo M w/PMHx notable for chronic systolic heart failure, NICM, moderate CAD, HTN, ABHINAV on CPAP, DM2, CKDIII who is transferred to [facility] for evaluation and management of advanced heart failure with arrhythmia now s/p HM 3 on .    Today's plan (3/5):  -Would diurese with IV lasix 40mg with goal of net neg 500c to net even   - Discontinue ASA while on Bival+Coumadin       Moderate CAD  Severe Mitral regurgitation  Chronic nonischemic systolic heart failure w/ reduced EF (15-20%) and exacerbation  New atrial fibrillation  NYHA Class III. Echo 2020: LVEF 15-20%; LVEDd 5.9 cm; 4+ MR. LHC 2019 w/ nonobstructive CAD w/ severe branch disease.Presented with increased wt gain, SOB, LE edema concerning for HF exacerbation, initially concerning for cardiogenic shock. Admitted to [facility] for further evaluation regarding need for advanced HF therapies. Patient is now s/p HM III placement on  with early post-op course complicated by bleeding that is slowing down as of  AM. Patient is persistently tachycardic to 130-140 with a wide-complex mostly-regular tachycardia. ECG obtained difficult to interpret given LVAD artifact, but afib w/ RVR and aberrancy vs. custodial vs. AT vs. Slow VT. S/p DCCV on  back into sinus rhythm. With patient's bleeding history, will hold ASA presently,   -Hold ASA 81  - Continue atorvastatin 20 mg  -Continue coumadin with goal INR 2-3 / CFX goal 20-40 (while on Bival)  -Agree with continuing Hydral 100mg TID for afterload reduction   -LVAD speed at 5500 RPM    #Retrosternal hematoma:  As seen on  CT chest at 5.1 cm. S/P chest tube per CVTS on 3/5. Chest tube drainage decreasing presently and HGB holding steady, low concern for bleeding presently. That being said,  "will discontinue ASA while patient is on bival+Coumadin     Proteus and Enterococcus bacteremia  Organisms growing from 2/2 blood cultures from 2/13. Blood cultured from 2/16 NGTD and 2/15 growing 1/2 S.epi, likely contaminant per ID.  ID consulted, appreciate help. Will decide on final duration of abx.  -daily blood cultures until negative  -Zosyn (2/13-2/14)  -Tobramycin (2/13-2/14)  -Vancomycin (2/13-2/17  -Meropenem (2/14-2/15)  -Ceftriaxone (2/16-2/28)  -Ampicillin (2/16-3/11) (14 days after swan removal on 2/26)    #Thrombocytopenia:  Concern for HIT given timing with respect to heparin exposure. HIT positive DAVINA negative. Antibody is now negative x2, thus no further indication for PLAX  -Heme consulted  -Continuing Bivalirudin gtt after LVAD surgery,goal chromogenic level 20-40 (given hemothorax)    VFib requiring shock  Shocked x 3 prior to transfer, loaded with amio. No known prior history. Suspect related to underlying HF.   -Keep K>4, Mg>2  -Amio 400 mg PO QD  -Needs ICD for secondary prevention, will need to discuss with EP     Atrial flutter   S/p cardioversion 2/28/20   - On Coumadin+Bivalirudin as above.       Sonia Portillo MD  Cardiology Fellow  PGY4                Subjective/Interval Events     - MAPs have improved            Objective     /87 (BP Location: Right arm)   Pulse 86   Temp 97.9  F (36.6  C) (Oral)   Resp 19   Ht 1.702 m (5' 7\")   Wt 97.8 kg (215 lb 9.8 oz)   SpO2 98%   BMI 33.77 kg/m    Temp:  [97.6  F (36.4  C)-98.2  F (36.8  C)] 97.9  F (36.6  C)  Pulse:  [85-95] 86  Heart Rate:  [] 93  Resp:  [16-20] 19  BP: ()/(60-87) 108/87  MAP:  [90 mmHg-119 mmHg] 92 mmHg  Arterial Line BP: ()/(70-95) 109/82  SpO2:  [94 %-100 %] 98 %  Wt Readings from Last 2 Encounters:   03/08/20 97.8 kg (215 lb 9.8 oz)     I/O last 3 completed shifts:  In: 1683.84 [P.O.:1290; I.V.:393.84]  Out: 2290 [Urine:2100; Chest Tube:190]      Gen: No acute distress  HEENT: NC/AT, +JVD "   PULM/THORAX: CTAB  CV: normal rate, regular rhythm, normal S1 and S2, LVAD hum  ABD: Soft, NTND, bowel sounds present, no masses  EXT: WWP. 1+ pitting LE edema, clubbing or cyanosis.  NEURO: A&Ox3                Data:     Recent Results (from the past 24 hour(s))   Glucose by meter    Collection Time: 03/07/20  5:05 PM   Result Value Ref Range    Glucose 157 (H) 70 - 99 mg/dL   Glucose by meter    Collection Time: 03/07/20  9:59 PM   Result Value Ref Range    Glucose 158 (H) 70 - 99 mg/dL   Glucose by meter    Collection Time: 03/08/20  3:06 AM   Result Value Ref Range    Glucose 171 (H) 70 - 99 mg/dL   Basic metabolic panel    Collection Time: 03/08/20  5:27 AM   Result Value Ref Range    Sodium 135 133 - 144 mmol/L    Potassium 3.8 3.4 - 5.3 mmol/L    Chloride 100 94 - 109 mmol/L    Carbon Dioxide 29 20 - 32 mmol/L    Anion Gap 6 3 - 14 mmol/L    Glucose 156 (H) 70 - 99 mg/dL    Urea Nitrogen 25 7 - 30 mg/dL    Creatinine 1.11 0.66 - 1.25 mg/dL    GFR Estimate 68 >60 mL/min/[1.73_m2]    GFR Estimate If Black 79 >60 mL/min/[1.73_m2]    Calcium 7.8 (L) 8.5 - 10.1 mg/dL   CBC with platelets    Collection Time: 03/08/20  5:27 AM   Result Value Ref Range    WBC 12.2 (H) 4.0 - 11.0 10e9/L    RBC Count 2.59 (L) 4.4 - 5.9 10e12/L    Hemoglobin 7.4 (L) 13.3 - 17.7 g/dL    Hematocrit 24.7 (L) 40.0 - 53.0 %    MCV 95 78 - 100 fl    MCH 28.6 26.5 - 33.0 pg    MCHC 30.0 (L) 31.5 - 36.5 g/dL    RDW 16.4 (H) 10.0 - 15.0 %    Platelet Count 431 150 - 450 10e9/L   Factor 10 chromogenic    Collection Time: 03/08/20  5:27 AM   Result Value Ref Range    Chromogenic Factor 10 45 (L) 70 - 130 %   INR    Collection Time: 03/08/20  5:27 AM   Result Value Ref Range    INR 2.27 (H) 0.86 - 1.14   Magnesium    Collection Time: 03/08/20  5:27 AM   Result Value Ref Range    Magnesium 2.0 1.6 - 2.3 mg/dL   Partial thromboplastin time    Collection Time: 03/08/20  5:27 AM   Result Value Ref Range    PTT 80 (H) 22 - 37 sec   Glucose by meter     Collection Time: 20  5:43 AM   Result Value Ref Range    Glucose 144 (H) 70 - 99 mg/dL   Glucose by meter    Collection Time: 20  9:42 AM   Result Value Ref Range    Glucose 148 (H) 70 - 99 mg/dL   Glucose by meter    Collection Time: 20 12:06 PM   Result Value Ref Range    Glucose 181 (H) 70 - 99 mg/dL       Recent Results (from the past 24 hour(s))   Echocardiogram Complete    Narrative    [id number]  [id number]  [id number]  [id number]^MATT^NAHUN^JIM           [facility],[place]  Echocardiography Laboratory  [address] [place] [address]     Name: WOLFGANG LEACH  MRN: [id number]  : 1955  Study Date: 2020 10:06 AM  Age: 64 yrs  Gender: Male  Patient Location: [facility]  Reason For Study: Heart Failure  Ordering Physician: NAHUN GUTIERREZ  Referring Physician: SELF, REFERRED  Performed By: Yvonne Adan RDCS     BSA: 2.2 m2  Height: 67 in  Weight: 244 lb  HR: 103  BP: 72/52 mmHg  _____________________________________________________________________________  __        Procedure  Complete Portable Echo Adult. Contrast Optison. Optison (NDC #0586-5359-63)  given intravenously. Patient was given 6 ml mixture of 3 ml Optison and 6 ml  saline. 3 ml wasted.  _____________________________________________________________________________  __        Interpretation Summary  Left ventricular size is normal.  LVEF 20% based on biplane 2D tracing.  Mild to moderate right ventricular dilation is present.  Global right ventricular function is moderately reduced.  Pulmonary artery systolic pressure is normal.  Dilation of the inferior vena cava is present with abnormal respiratory  variation in diameter.  _____________________________________________________________________________  __        Left Ventricle  Left ventricular size is normal. LVEF 20% based on biplane 2D tracing. Left  ventricular wall thickness is normal. Diastolic function not assessed due to  frequent  ectopy. Abnormal septal motion consistent with left bundle branch  block is present.     Right Ventricle  Mild to moderate right ventricular dilation is present. Global right  ventricular function is moderately reduced.     Atria  Mild biatrial enlargement is present.     Mitral Valve  Moderate mitral insufficiency is present.        Aortic Valve  Aortic valve is normal in structure and function.     Tricuspid Valve  Moderate tricuspid insufficiency is present. The right ventricular systolic  pressure is approximated at 24.4 mmHg plus the right atrial pressure.  Pulmonary artery systolic pressure is normal.     Pulmonic Valve  The pulmonic valve cannot be assessed. Mild pulmonic insufficiency is present.     Vessels  The aorta root is normal. The pulmonary artery cannot be assessed. Dilation of  the inferior vena cava is present with abnormal respiratory variation in  diameter.     Compared to Previous Study  Previous study not available for comparison.     _____________________________________________________________________________  __  MMode/2D Measurements & Calculations  IVSd: 0.61 cm  LVIDd: 4.7 cm  LVIDs: 3.7 cm  LVPWd: 0.75 cm  FS: 21.7 %  LV mass(C)d: 99.1 grams  LV mass(C)dI: 45.0 grams/m2     Ao root diam: 2.9 cm  asc Aorta Diam: 2.5 cm  LVOT diam: 1.9 cm  LVOT area: 2.8 cm2     EF(MOD-bp): 21.5 %  LA Volume (BP): 84.8 ml  LA Volume Index (BP): 38.5 ml/m2  RWT: 0.32        Doppler Measurements & Calculations  PA acc time: 0.09 sec     PI end-d saumya: 154.0 cm/sec  TR max saumya: 247.0 cm/sec  TR max P.4 mmHg     _____________________________________________________________________________  __           Report approved by: Graciela Tomlinson 2020 12:05 PM      XR Chest Port 1 View    Narrative    XR CHEST PORT 1 VW  2020 4:21 PM      HISTORY: SG Placement    COMPARISON: None available    FINDINGS: Single AP chest radiograph. [place]-Chucky catheter tip is in the  proximal right main pulmonary artery.  Right central line tip and right  upper extremity PICC line tip at the lower SVC. Trachea is midline,  cardiomegaly. Bilateral perihilar streaky opacities. Mild increased  interstitial opacities in the right lung with ill-defined pulmonary  vascularity. No pneumothorax or pleural effusion. No acute osseous or  abdominal abnormality. Elevated left hemidiaphragm.      Impression    IMPRESSION:  1. [place]-Chucky catheter tip at the proximal right main pulmonary artery.  2. Cardiomegaly with mild pulmonary edema, especially on the right.    I have personally reviewed the examination and initial interpretation  and I agree with the findings.    DONG SOLORIO MD   Cardiac Catheterization    Narrative      Successful insertion of a IABP in the RFA                Medications

## 2020-03-08 NOTE — PLAN OF CARE
Major Shift Events:  Pt slept well overnight, dilaudid for pain. K, Mag replaced.  Plan: Awaiting bed on 6C. Continue to monitor and notify MD of questions or concerns.  For vital signs and complete assessments, please see documentation flowsheets.

## 2020-03-08 NOTE — PROGRESS NOTES
CARDIOTHORACIC SURGERY PROGRESS NOTE  03/08/2020      SUBJECTIVE:    ASSESSMENT:  Mr Eliseo Tanner is a 65yo M w/PMHx notable for chronic systolic heart failure, NICM, moderate CAD, HTN, ABHINAV on CPAP, DM2, CKDIII who is transferred to [facility] for evaluation and management of advanced heart failure with arrhythmia now s/p HM 3 on 2/18. 3/5 had hemothorax requiring a chest tube. Thoracic surgery will operate on him 3/5.      Changes:   Hold asa for now per cards for bleeding risk     PLAN:  Neuro:   - Neuro intact  - Pain; tylenol and oxycodone, lidocaine patches ordered   - Depression; fluoxetine     CV:   - Hx of chronic systolic heart failure with EF 15-20%  - Atrial Fibrillation     - EP Consult, cardioversion 2/28. Amio 400 mg PO BID with taper, digoxin 125 mcg.    - HMIII LVAD     - ASA 81 mg, atorvastatin 20 mg.     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  Coumadin goal INR 2-3.     - INR 2.97, goal 2-3  -bival and warfarin currently, following chromogenic factor   - HTN; hydralazine 100 q 8 hrs, lisinopril 5 mg  - VT; EP recommended ICD placement prior to discharge. EP working on time  -hemothorax              Chest tube placed   VATS 3/6, extensive clot burden removed      Pulm:   - Hx ABHINAV on CPAP   - Pulm toilet, IS, activity and deep breathing   - Supplemental O2 PRN to keep sats > 92%. Wean off as tolerated.      ID:   - WBC WNL, afebrile, no signs or symptoms of infection   - Bacteremia 2/13 and 2/15; ampicillin 2 grams q 6 hrs (14 days after removal of all central venous catheters (2/26 - 3/11))   - ID recommends surveillance cultures every week x 4 weeks after stopping all antibiotics (note on 2/25/20). Repeat cultures for surveillance drawn 3/3, NGTD.      GI / FEN:  - Reg diet, bowel regimen  - Hx GERD.      Renal / :   - CKD III; creatinine 1.23, adequate UOP.   - lasix 40 IV.      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      Endo:   - Hx Gout; allopurinol 200 mg daily   - T2DM; sliding scale insulin and lantus 10  units (change lantus 10 units to q hs)       Disposition:  -ok to go to the floor following thoracic surgery     OBJECTIVE:   Vitals:  Temp:  [97.6  F (36.4  C)-98.2  F (36.8  C)] 97.6  F (36.4  C)  Pulse:  [85-95] 86  Heart Rate:  [] 89  Resp:  [16-20] 17  BP: ()/(60-87) 82/70  MAP:  [90 mmHg-119 mmHg] 92 mmHg  Arterial Line BP: ()/() 109/82  SpO2:  [94 %-100 %] 100 %    Physical Exam:  General: Alert, well-appearing in no acute distress.  HEENT: Normocephalic, atraumatic.  Chest wall: Symmetric thorax. No masses. Chest site c/d/i  Respiratory: Non-labored breathing. Lung sounds clear to auscultation bilaterally. Chest tube in place   Cardiovascular: Regular rate and rhythm.   Gastrointestinal: Abdomen soft, non-distended, non-tender to palpation.   Extremities: Moving all four extremities. No limb deformities. No pedal edema.   Skin: As noted above. No rashes or lesions appreciated.       I&O's:  I/O last 3 completed shifts:  In: 1683.84 [P.O.:1290; I.V.:393.84]  Out: 2290 [Urine:2100; Chest Tube:190]    Ventilator Settings:  Resp: 17      Labs:  BMP  Recent Labs   Lab 03/08/20  0527 03/07/20  0341 03/06/20  1615 03/06/20  0322 03/05/20  0434    135 137 135 134   POTASSIUM 3.8 4.8 4.1 4.1 4.2   CHLORIDE 100 103  --  104 103   CALOS 7.8* 7.7*  --  8.0* 8.1*   CO2 29 29  --  30 28   BUN 25 18  --  16 17   CR 1.11 1.03  --  1.02 1.07   * 133* 123* 149* 121*     CBC  Recent Labs   Lab 03/08/20  0527 03/07/20  0341 03/06/20  1615 03/06/20  0322  03/05/20  0434   WBC 12.2* 14.2*  --  10.7  --  11.2*   RBC 2.59* 2.84*  --  2.78*  --  2.91*   HGB 7.4* 8.1* 8.0* 8.0*   < > 8.4*   HCT 24.7* 26.8*  --  26.5*  --  27.5*   MCV 95 94  --  95  --  95   MCH 28.6 28.5  --  28.8  --  28.9   MCHC 30.0* 30.2*  --  30.2*  --  30.5*   RDW 16.4* 16.6*  --  16.4*  --  16.7*    409  --  392  --  401    < > = values in this interval not displayed.     INR  Recent Labs   Lab 03/08/20  0586  03/07/20  0341 03/06/20  0322 03/05/20  0434   INR 2.27* 1.66* 1.45* 1.41*      Hepatic Panel   Recent Labs   Lab 03/04/20  0757   AST 20   ALT 21   ALKPHOS 106   BILITOTAL 0.3   ALBUMIN 1.8*     Glucose  Recent Labs   Lab 03/08/20  0942 03/08/20  0543 03/08/20  0527 03/08/20  0306 03/07/20  2159 03/07/20  1705 03/07/20  1143  03/07/20  0341  03/06/20  1615  03/06/20  0322  03/05/20  0434  03/04/20  0602   GLC  --   --  156*  --   --   --   --   --  133*  --  123*  --  149*  --  121*  --  145*   * 144*  --  171* 158* 157* 155*   < >  --    < >  --    < >  --    < >  --    < >  --     < > = values in this interval not displayed.     Blood Gas  Recent Labs   Lab 03/06/20  1615 03/06/20  1550   PH 7.41 7.39   PCO2 42 45   PO2 96 49*   HCO3 26 27   O2PER 65 65       Imaging:   Recent Results (from the past 24 hour(s))   XR Chest Port 1 View    Narrative    EXAM: XR CHEST PORT 1 VW  3/8/2020 1:55 AM     HISTORY: s/p R VATS       COMPARISON: 3/7/2020    FINDINGS:   Single semiupright frontal radiograph of the chest. Postoperative  changes of the mediastinum and LVAD are stable. Right apically and  basally directed chest tubes. Tubing overlying the right midlung  field.     The trachea is midline. The cardiomediastinal silhouette and pulmonary  vasculature are unchanged. Low lung volumes are stable. Tiny locule of  pleural air adjacent to the tip of the right apical chest tube. Stable  residual small right hemothorax/pleural effusion. Increased left  pleural effusion. Stable right midlung opacity.     The included osseous structures, soft tissues and upper abdomen and  are within normal limits.        Impression    IMPRESSION:   1. Postoperative changes of thoracotomy with tiny pneumothorax  adjacent to the tip of the right apical chest tube.  2. Increased small left pleural effusion. Otherwise stable chest.    I have personally reviewed the examination and initial interpretation  and I agree with the  findings.    DEION FLANAGAN MD         Dispo:   - CVICU    Discussed with ICU staff.     Loreto Holcomb MD

## 2020-03-09 ENCOUNTER — APPOINTMENT (OUTPATIENT)
Dept: PHYSICAL THERAPY | Facility: CLINIC | Age: 65
DRG: 001 | End: 2020-03-09
Attending: INTERNAL MEDICINE
Payer: MEDICARE

## 2020-03-09 ENCOUNTER — APPOINTMENT (OUTPATIENT)
Dept: GENERAL RADIOLOGY | Facility: CLINIC | Age: 65
DRG: 001 | End: 2020-03-09
Attending: INTERNAL MEDICINE
Payer: MEDICARE

## 2020-03-09 LAB
ANION GAP SERPL CALCULATED.3IONS-SCNC: 3 MMOL/L (ref 3–14)
APTT PPP: 82 SEC (ref 22–37)
BACTERIA SPEC CULT: NO GROWTH
BACTERIA SPEC CULT: NO GROWTH
BUN SERPL-MCNC: 21 MG/DL (ref 7–30)
CALCIUM SERPL-MCNC: 7.9 MG/DL (ref 8.5–10.1)
CHLORIDE SERPL-SCNC: 98 MMOL/L (ref 94–109)
CO2 SERPL-SCNC: 33 MMOL/L (ref 20–32)
CREAT SERPL-MCNC: 0.92 MG/DL (ref 0.66–1.25)
ERYTHROCYTE [DISTWIDTH] IN BLOOD BY AUTOMATED COUNT: 16.6 % (ref 10–15)
FACT X ACT/NOR PPP CHRO: 39 % (ref 70–130)
GFR SERPL CREATININE-BSD FRML MDRD: 86 ML/MIN/{1.73_M2}
GLUCOSE BLDC GLUCOMTR-MCNC: 109 MG/DL (ref 70–99)
GLUCOSE BLDC GLUCOMTR-MCNC: 131 MG/DL (ref 70–99)
GLUCOSE BLDC GLUCOMTR-MCNC: 160 MG/DL (ref 70–99)
GLUCOSE BLDC GLUCOMTR-MCNC: 160 MG/DL (ref 70–99)
GLUCOSE BLDC GLUCOMTR-MCNC: 173 MG/DL (ref 70–99)
GLUCOSE SERPL-MCNC: 122 MG/DL (ref 70–99)
HCT VFR BLD AUTO: 25.4 % (ref 40–53)
HGB BLD-MCNC: 7.6 G/DL (ref 13.3–17.7)
INR PPP: 2.29 (ref 0.86–1.14)
MAGNESIUM SERPL-MCNC: 2.1 MG/DL (ref 1.6–2.3)
MCH RBC QN AUTO: 28.8 PG (ref 26.5–33)
MCHC RBC AUTO-ENTMCNC: 29.9 G/DL (ref 31.5–36.5)
MCV RBC AUTO: 96 FL (ref 78–100)
PLATELET # BLD AUTO: 463 10E9/L (ref 150–450)
POTASSIUM SERPL-SCNC: 3.7 MMOL/L (ref 3.4–5.3)
RBC # BLD AUTO: 2.64 10E12/L (ref 4.4–5.9)
SODIUM SERPL-SCNC: 134 MMOL/L (ref 133–144)
SPECIMEN SOURCE: NORMAL
SPECIMEN SOURCE: NORMAL
WBC # BLD AUTO: 10.5 10E9/L (ref 4–11)

## 2020-03-09 PROCEDURE — 25000128 H RX IP 250 OP 636: Performed by: PHYSICIAN ASSISTANT

## 2020-03-09 PROCEDURE — 25000132 ZZH RX MED GY IP 250 OP 250 PS 637: Mod: GY | Performed by: THORACIC SURGERY (CARDIOTHORACIC VASCULAR SURGERY)

## 2020-03-09 PROCEDURE — 25000132 ZZH RX MED GY IP 250 OP 250 PS 637: Mod: GY | Performed by: PHYSICIAN ASSISTANT

## 2020-03-09 PROCEDURE — 25000128 H RX IP 250 OP 636: Performed by: STUDENT IN AN ORGANIZED HEALTH CARE EDUCATION/TRAINING PROGRAM

## 2020-03-09 PROCEDURE — 99233 SBSQ HOSP IP/OBS HIGH 50: CPT | Mod: GC | Performed by: INTERNAL MEDICINE

## 2020-03-09 PROCEDURE — 85027 COMPLETE CBC AUTOMATED: CPT | Performed by: THORACIC SURGERY (CARDIOTHORACIC VASCULAR SURGERY)

## 2020-03-09 PROCEDURE — 21400000 ZZH R&B CCU UMMC

## 2020-03-09 PROCEDURE — 25000132 ZZH RX MED GY IP 250 OP 250 PS 637: Mod: GY | Performed by: STUDENT IN AN ORGANIZED HEALTH CARE EDUCATION/TRAINING PROGRAM

## 2020-03-09 PROCEDURE — 97116 GAIT TRAINING THERAPY: CPT | Mod: GP | Performed by: REHABILITATION PRACTITIONER

## 2020-03-09 PROCEDURE — 93010 ELECTROCARDIOGRAM REPORT: CPT | Performed by: INTERNAL MEDICINE

## 2020-03-09 PROCEDURE — 97530 THERAPEUTIC ACTIVITIES: CPT | Mod: GP | Performed by: REHABILITATION PRACTITIONER

## 2020-03-09 PROCEDURE — 85610 PROTHROMBIN TIME: CPT | Performed by: THORACIC SURGERY (CARDIOTHORACIC VASCULAR SURGERY)

## 2020-03-09 PROCEDURE — 99231 SBSQ HOSP IP/OBS SF/LOW 25: CPT | Mod: GC | Performed by: ANESTHESIOLOGY

## 2020-03-09 PROCEDURE — 85260 CLOT FACTOR X STUART-POWER: CPT | Performed by: THORACIC SURGERY (CARDIOTHORACIC VASCULAR SURGERY)

## 2020-03-09 PROCEDURE — 93005 ELECTROCARDIOGRAM TRACING: CPT

## 2020-03-09 PROCEDURE — 83735 ASSAY OF MAGNESIUM: CPT | Performed by: THORACIC SURGERY (CARDIOTHORACIC VASCULAR SURGERY)

## 2020-03-09 PROCEDURE — 36415 COLL VENOUS BLD VENIPUNCTURE: CPT | Performed by: THORACIC SURGERY (CARDIOTHORACIC VASCULAR SURGERY)

## 2020-03-09 PROCEDURE — 71045 X-RAY EXAM CHEST 1 VIEW: CPT

## 2020-03-09 PROCEDURE — 25800030 ZZH RX IP 258 OP 636: Performed by: STUDENT IN AN ORGANIZED HEALTH CARE EDUCATION/TRAINING PROGRAM

## 2020-03-09 PROCEDURE — 80048 BASIC METABOLIC PNL TOTAL CA: CPT | Performed by: THORACIC SURGERY (CARDIOTHORACIC VASCULAR SURGERY)

## 2020-03-09 PROCEDURE — 85730 THROMBOPLASTIN TIME PARTIAL: CPT | Performed by: THORACIC SURGERY (CARDIOTHORACIC VASCULAR SURGERY)

## 2020-03-09 PROCEDURE — 00000146 ZZHCL STATISTIC GLUCOSE BY METER IP

## 2020-03-09 RX ORDER — FUROSEMIDE 10 MG/ML
40 INJECTION INTRAMUSCULAR; INTRAVENOUS ONCE
Status: COMPLETED | OUTPATIENT
Start: 2020-03-09 | End: 2020-03-09

## 2020-03-09 RX ORDER — WARFARIN SODIUM 4 MG/1
4 TABLET ORAL
Status: COMPLETED | OUTPATIENT
Start: 2020-03-09 | End: 2020-03-09

## 2020-03-09 RX ADMIN — HYDRALAZINE HYDROCHLORIDE 100 MG: 100 TABLET, FILM COATED ORAL at 13:27

## 2020-03-09 RX ADMIN — QUETIAPINE FUMARATE 25 MG: 25 TABLET ORAL at 22:20

## 2020-03-09 RX ADMIN — DIGOXIN 125 MCG: 125 TABLET ORAL at 09:22

## 2020-03-09 RX ADMIN — INSULIN ASPART 2 UNITS: 100 INJECTION, SOLUTION INTRAVENOUS; SUBCUTANEOUS at 22:21

## 2020-03-09 RX ADMIN — Medication 200 MG: at 09:21

## 2020-03-09 RX ADMIN — HYDROMORPHONE HYDROCHLORIDE 2 MG: 2 TABLET ORAL at 19:57

## 2020-03-09 RX ADMIN — ACETAMINOPHEN 650 MG: 325 TABLET, FILM COATED ORAL at 22:19

## 2020-03-09 RX ADMIN — AMPICILLIN SODIUM 2 G: 2 INJECTION, POWDER, FOR SOLUTION INTRAMUSCULAR; INTRAVENOUS at 13:27

## 2020-03-09 RX ADMIN — INSULIN ASPART 1 UNITS: 100 INJECTION, SOLUTION INTRAVENOUS; SUBCUTANEOUS at 12:59

## 2020-03-09 RX ADMIN — AMPICILLIN SODIUM 2 G: 2 INJECTION, POWDER, FOR SOLUTION INTRAMUSCULAR; INTRAVENOUS at 09:27

## 2020-03-09 RX ADMIN — ALLOPURINOL 200 MG: 100 TABLET ORAL at 09:22

## 2020-03-09 RX ADMIN — POTASSIUM CHLORIDE 20 MEQ: 750 TABLET, EXTENDED RELEASE ORAL at 10:07

## 2020-03-09 RX ADMIN — FUROSEMIDE 40 MG: 10 INJECTION, SOLUTION INTRAMUSCULAR; INTRAVENOUS at 12:03

## 2020-03-09 RX ADMIN — HYDRALAZINE HYDROCHLORIDE 100 MG: 100 TABLET, FILM COATED ORAL at 22:20

## 2020-03-09 RX ADMIN — ACETAMINOPHEN 650 MG: 325 TABLET, FILM COATED ORAL at 18:00

## 2020-03-09 RX ADMIN — ACETAMINOPHEN 650 MG: 325 TABLET, FILM COATED ORAL at 12:01

## 2020-03-09 RX ADMIN — PANTOPRAZOLE SODIUM 40 MG: 40 TABLET, DELAYED RELEASE ORAL at 09:21

## 2020-03-09 RX ADMIN — BIVALIRUDIN 0.02 MG/KG/HR: 250 INJECTION INTRACAVERNOUS at 22:41

## 2020-03-09 RX ADMIN — AMPICILLIN SODIUM 2 G: 2 INJECTION, POWDER, FOR SOLUTION INTRAMUSCULAR; INTRAVENOUS at 20:02

## 2020-03-09 RX ADMIN — ATORVASTATIN CALCIUM 20 MG: 20 TABLET, FILM COATED ORAL at 19:57

## 2020-03-09 RX ADMIN — AMPICILLIN SODIUM 2 G: 2 INJECTION, POWDER, FOR SOLUTION INTRAMUSCULAR; INTRAVENOUS at 03:26

## 2020-03-09 RX ADMIN — MULTIPLE VITAMINS W/ MINERALS TAB 1 TABLET: TAB at 09:22

## 2020-03-09 RX ADMIN — AMIODARONE HYDROCHLORIDE 200 MG: 200 TABLET ORAL at 09:23

## 2020-03-09 RX ADMIN — WARFARIN SODIUM 4 MG: 4 TABLET ORAL at 18:00

## 2020-03-09 RX ADMIN — MAGNESIUM OXIDE 400 MG: 400 TABLET ORAL at 09:22

## 2020-03-09 RX ADMIN — INSULIN ASPART 1 UNITS: 100 INJECTION, SOLUTION INTRAVENOUS; SUBCUTANEOUS at 17:57

## 2020-03-09 RX ADMIN — HYDRALAZINE HYDROCHLORIDE 100 MG: 100 TABLET, FILM COATED ORAL at 09:21

## 2020-03-09 RX ADMIN — BIVALIRUDIN 0.02 MG/KG/HR: 250 INJECTION INTRACAVERNOUS at 12:01

## 2020-03-09 RX ADMIN — FLUOXETINE 20 MG: 20 CAPSULE ORAL at 09:21

## 2020-03-09 ASSESSMENT — ACTIVITIES OF DAILY LIVING (ADL)
ADLS_ACUITY_SCORE: 16
ADLS_ACUITY_SCORE: 15
ADLS_ACUITY_SCORE: 16
ADLS_ACUITY_SCORE: 15

## 2020-03-09 ASSESSMENT — PAIN DESCRIPTION - DESCRIPTORS
DESCRIPTORS: ACHING
DESCRIPTORS: ACHING

## 2020-03-09 ASSESSMENT — MIFFLIN-ST. JEOR: SCORE: 1708.42

## 2020-03-09 NOTE — PROGRESS NOTES
CLINICAL NUTRITION SERVICES - REASSESSMENT NOTE     Nutrition Prescription    Malnutrition Status:    Does not meet criteria     Interventions by Registered Dietitian (RD):  Continue additional snacks/supplements as ordered     Future recommendations:  If intake declines, consider liberalizing to regular diet to promote oral intake        EVALUATION OF THE PROGRESS TOWARD GOALS   Diet: Low Saturated Fat, <2400 mg Sodium  Intake: Good appetite. Eating 100% 3 meals/day, x1-2 gelatein and x1 boost plus daily. Based on diet recall, pt consuming approx 1800 kcal and 130 g protein daily (meeting 100% estimated needs).       NEW FINDINGS   GI: Last documented BM on 3/3. On bowel regimen.    Supplementation: Thera-vit-M, Co-Q 10 200 mg daily   Wt trends: Remains above driest weight of 206 lbs (94 kg) over past week.     MALNUTRITION  % Intake: No decreased intake noted  % Weight Loss: Difficult to assess with post-op status and diuresis.  Subcutaneous Fat Loss: None observed  Muscle Loss: None observed  Fluid Accumulation/Edema: Does not meet criteria  Malnutrition Diagnosis: Patient does not meet two of the established criteria necessary for diagnosing malnutrition    Previous Goals   Patient to consume % of nutritionally adequate meal trays TID, or the equivalent with supplements/snacks.  Evaluation: Met    Previous Nutrition Diagnosis  Inadequate oral intake  Evaluation: Improving    CURRENT NUTRITION DIAGNOSIS  Predicted inadequate nutrient intake (kcal/protein) related to increased nutrition needs post-LVAD and potential for intake adequacy to decline pending LOS -vs- medical course.     INTERVENTIONS  Implementation  Nutrition education - Reviewed increased protein needs post-LVAD. Encouraged adequate meals/supplements to meet nutrition needs.     Goals  Patient to consume % of nutritionally adequate meal trays TID, or the equivalent with supplements/snacks.    Monitoring/Evaluation  Progress toward goals  will be monitored and evaluated per protocol.    Marisela Bhandari RD, LD  [id number]  Pgr: [phone]

## 2020-03-09 NOTE — OP NOTE
OPERATIVE DATE: 3/9/2020    PRE-OPERATIVE DIAGNOSIS:  1) End stage heart failure status post Heartmate 3 left ventricular assist device  2) Retained right hemothorax    POST-OPERATIVE DIAGNOSIS:  1) End stage heart failure status post Heartmate 3 left ventricular assist device  2) Retained right hemothorax    PROCEDURE:  1) Right VATS evacuation of hemothorax    SURGEON: Mac Jaramillo MD    ASSISTANT SURGEON:  William Gan MD & Vargas Montaño MD    ANESTHESIA: GETA    ESTIMATED BLOOD LOSS: 50 mL    INDICATIONS:  Mr. WOLFGANG LEACH is a 64 year old male admitted with end stage heart failure s/p HM3 LVAD. He was recovering on the floor on bivalrudin for HIT and had a chest tube removed with subsequent development of a right hemithorax opacification. A tube thoracostomy was placed but failed to resolve the opacification. He is scheduled for VATS possible thoracotomy evacuation of hemothorax.  Risks and benefits of the operation were explained to the patient and their family including, but not limited to, bleeding, infection, stroke and even death.  They understood these risks and agreed to proceed electively.    OPERATIVE REPORT:  The patient was transferred to the operating room and positioned supine on the OR table.  General anesthesia was monitored by the anesthesia team via a double lumen endotracheal tube. The patients chest was clipped, prepped and draped in sterile fashion.  A pre-procedure time-out was performed confirming the correct patient, correct site and correct procedure.    I acted as an assistant to Dr. Jaramillo for this operation. Please see his dictated operative report for details regarding the procedure.    All needle, sponge and instrument counts were correct at the end of the case.    William Gan MD  Cardiothoracic Surgery  [phone]

## 2020-03-09 NOTE — PLAN OF CARE
Discharge Planner PT   Patient plan for discharge: TCU, then to [facility] ([facility] apartment) with wife  Current status: Pt performed sit->stand trials x 7 throughout PT session with Min A x 2. Pt amb ~ 150 feet x 2 with WW and CGA, 1 seated rest break between bouts. Pt limited by incisional pain. Pt required assistance for management of LVAD from wall power to batteries for mobility.   Barriers to return to prior living situation: sternal precautions, decreased strength, activity intolerance, impaired balance  Recommendations for discharge: TCU  Rationale for recommendations: Pt would benefit from additional skilled PT to address functional mobility, transfers, gait, balance and to maximize IND to reduce caregiver burden. Pt reports his wife recently had RTC surgery. She will likely have limited ability to assist pt at home at discharge, would benefit from TCU.       Entered by: Angelita Juarez 03/09/2020 3:08 PM

## 2020-03-09 NOTE — PROGRESS NOTES
Advanced Heart Failure Consult Progress Note  Eliseo Tanner MRN: [id number]  Age: 64 year old, : 1955  Date: 2020                    Assessment and Plan:     Mr Eliseo Tanner is a 63yo M w/PMHx notable for chronic systolic heart failure, NICM, moderate CAD, HTN, ABHINAV on CPAP, DM2, CKDIII who is transferred to [facility] for evaluation and management of advanced heart failure with arrhythmia now s/p HM 3 on .    Today's plan (3/5):  -Cont diurese with IV lasix 40mg with goal of net neg 500c to net even   - stop bival if Xa within range tomorrow    Moderate CAD  Severe Mitral regurgitation  Chronic nonischemic systolic heart failure w/ reduced EF (15-20%) and exacerbation  New atrial fibrillation  NYHA Class III. Echo 2020: LVEF 15-20%; LVEDd 5.9 cm; 4+ MR. C 2019 w/ nonobstructive CAD w/ severe branch disease.Presented with increased wt gain, SOB, LE edema concerning for HF exacerbation, initially concerning for cardiogenic shock. Admitted to [facility] for further evaluation regarding need for advanced HF therapies. Patient is now s/p HM III placement on  with early post-op course complicated by bleeding that is slowing down as of  AM. Patient is persistently tachycardic to 130-140 with a wide-complex mostly-regular tachycardia. ECG obtained difficult to interpret given LVAD artifact, but afib w/ RVR and aberrancy vs. FDC vs. AT vs. Slow VT. S/p DCCV on  back into sinus rhythm. With patient's bleeding history, will hold ASA presently,   -Hold ASA 81  - Continue atorvastatin 20 mg  -Continue coumadin with goal INR 2-3 / CFX goal 20-40 (while on Bival)  -Agree with continuing Hydral 100mg TID for afterload reduction   -LVAD speed at 5500 RPM    #Retrosternal hematoma:  As seen on  CT chest at 5.1 cm. S/P chest tube per CVTS on 3/5. Chest tube drainage decreasing presently and HGB holding steady, low concern for bleeding presently. That being said, will  "discontinue ASA while patient is on bival+Coumadin     Proteus and Enterococcus bacteremia  Organisms growing from 2/2 blood cultures from 2/13. Blood cultured from 2/16 NGTD and 2/15 growing 1/2 S.epi, likely contaminant per ID.  ID consulted, appreciate help. Will decide on final duration of abx.  -daily blood cultures until negative  -Zosyn (2/13-2/14)  -Tobramycin (2/13-2/14)  -Vancomycin (2/13-2/17  -Meropenem (2/14-2/15)  -Ceftriaxone (2/16-2/28)  -Ampicillin (2/16-3/11) (14 days after swan removal on 2/26)    #Thrombocytopenia:  Concern for HIT given timing with respect to heparin exposure. HIT positive DAVINA negative. Antibody is now negative x2, thus no further indication for PLAX  -Heme consulted  -Continuing Bivalirudin gtt after LVAD surgery,goal chromogenic level 20-40 (given hemothorax)    VFib requiring shock  Shocked x 3 prior to transfer, loaded with amio. No known prior history. Suspect related to underlying HF.   -Keep K>4, Mg>2  -Amio 400 mg PO QD  -Needs ICD for secondary prevention, will need to discuss with EP     Atrial flutter   S/p cardioversion 2/28/20   - On Coumadin+Bivalirudin as above.       Vargas Harper MD  Cardiology Fellow  [id number]              Subjective/Interval Events     - MAPs have improved    This morning Mr. Tanner states he feels. Breathing is stable. He states his pain is well controlled.             Objective     BP (!) 105/93   Pulse 86   Temp 98.5  F (36.9  C) (Oral)   Resp 20   Ht 1.702 m (5' 7\")   Wt 97 kg (213 lb 12.8 oz)   SpO2 95%   BMI 33.49 kg/m    Temp:  [97.6  F (36.4  C)-98.5  F (36.9  C)] 98.5  F (36.9  C)  Heart Rate:  [75-96] 84  Resp:  [17-21] 20  BP: ()/(70-93) 105/93  SpO2:  [90 %-100 %] 95 %  Wt Readings from Last 2 Encounters:   03/09/20 97 kg (213 lb 12.8 oz)     I/O last 3 completed shifts:  In: 1917.2 [P.O.:1370; I.V.:547.2]  Out: 3240 [Urine:2610; Chest Tube:630]      Gen: No acute distress  HEENT: NC/AT, +JVD   PULM/THORAX: CTAB  CV: " normal rate, regular rhythm, normal S1 and S2, LVAD hum  ABD: Soft, NTND, bowel sounds present, no masses  EXT: WWP. 1+ pitting LE edema, clubbing or cyanosis.  NEURO: A&Ox3                Data:     Recent Results (from the past 24 hour(s))   Glucose by meter    Collection Time: 03/08/20  9:42 AM   Result Value Ref Range    Glucose 148 (H) 70 - 99 mg/dL   Glucose by meter    Collection Time: 03/08/20 12:06 PM   Result Value Ref Range    Glucose 181 (H) 70 - 99 mg/dL   Glucose by meter    Collection Time: 03/08/20  5:01 PM   Result Value Ref Range    Glucose 189 (H) 70 - 99 mg/dL   Glucose by meter    Collection Time: 03/08/20 10:41 PM   Result Value Ref Range    Glucose 141 (H) 70 - 99 mg/dL   Creatinine    Collection Time: 03/09/20 12:01 AM   Result Value Ref Range    Creatinine Canceled, Test credited 0.66 - 1.25 mg/dL    GFR Estimate Canceled, Test credited >60 mL/min/[1.73_m2]    GFR Estimate If Black Canceled, Test credited >60 mL/min/[1.73_m2]   Urea nitrogen    Collection Time: 03/09/20 12:01 AM   Result Value Ref Range    Urea Nitrogen Canceled, Test credited 7 - 30 mg/dL   Glucose by meter    Collection Time: 03/09/20  3:35 AM   Result Value Ref Range    Glucose 131 (H) 70 - 99 mg/dL   Basic metabolic panel    Collection Time: 03/09/20  4:43 AM   Result Value Ref Range    Sodium 134 133 - 144 mmol/L    Potassium 3.7 3.4 - 5.3 mmol/L    Chloride 98 94 - 109 mmol/L    Carbon Dioxide 33 (H) 20 - 32 mmol/L    Anion Gap 3 3 - 14 mmol/L    Glucose 122 (H) 70 - 99 mg/dL    Urea Nitrogen 21 7 - 30 mg/dL    Creatinine 0.92 0.66 - 1.25 mg/dL    GFR Estimate 86 >60 mL/min/[1.73_m2]    GFR Estimate If Black >90 >60 mL/min/[1.73_m2]    Calcium 7.9 (L) 8.5 - 10.1 mg/dL   CBC with platelets    Collection Time: 03/09/20  4:43 AM   Result Value Ref Range    WBC 10.5 4.0 - 11.0 10e9/L    RBC Count 2.64 (L) 4.4 - 5.9 10e12/L    Hemoglobin 7.6 (L) 13.3 - 17.7 g/dL    Hematocrit 25.4 (L) 40.0 - 53.0 %    MCV 96 78 - 100 fl     MCH 28.8 26.5 - 33.0 pg    MCHC 29.9 (L) 31.5 - 36.5 g/dL    RDW 16.6 (H) 10.0 - 15.0 %    Platelet Count 463 (H) 150 - 450 10e9/L   INR    Collection Time: 20  4:43 AM   Result Value Ref Range    INR 2.29 (H) 0.86 - 1.14   Magnesium    Collection Time: 20  4:43 AM   Result Value Ref Range    Magnesium 2.1 1.6 - 2.3 mg/dL   Partial thromboplastin time    Collection Time: 20  4:43 AM   Result Value Ref Range    PTT 82 (H) 22 - 37 sec       Recent Results (from the past 24 hour(s))   Echocardiogram Complete    Narrative    [id number]  [id number]  [id number]  [id number]^NAHUN^JIM           [facility],[place]  Echocardiography Laboratory  [address] [place] [id number]     Name: WOLFGANG LEACH  MRN: [id number]  : 1955  Study Date: 2020 10:06 AM  Age: 64 yrs  Gender: Male  Patient Location: [facility]  Reason For Study: Heart Failure  Ordering Physician: NAHUN GUTIERREZ  Referring Physician: SELF, REFERRED  Performed By: Yvonne Adan RDCS     BSA: 2.2 m2  Height: 67 in  Weight: 244 lb  HR: 103  BP: 72/52 mmHg  _____________________________________________________________________________  __        Procedure  Complete Portable Echo Adult. Contrast Optison. Optison (NDC #5313-5542-07)  given intravenously. Patient was given 6 ml mixture of 3 ml Optison and 6 ml  saline. 3 ml wasted.  _____________________________________________________________________________  __        Interpretation Summary  Left ventricular size is normal.  LVEF 20% based on biplane 2D tracing.  Mild to moderate right ventricular dilation is present.  Global right ventricular function is moderately reduced.  Pulmonary artery systolic pressure is normal.  Dilation of the inferior vena cava is present with abnormal respiratory  variation in diameter.  _____________________________________________________________________________  __        Left Ventricle  Left ventricular size is normal.  LVEF 20% based on biplane 2D tracing. Left  ventricular wall thickness is normal. Diastolic function not assessed due to  frequent ectopy. Abnormal septal motion consistent with left bundle branch  block is present.     Right Ventricle  Mild to moderate right ventricular dilation is present. Global right  ventricular function is moderately reduced.     Atria  Mild biatrial enlargement is present.     Mitral Valve  Moderate mitral insufficiency is present.        Aortic Valve  Aortic valve is normal in structure and function.     Tricuspid Valve  Moderate tricuspid insufficiency is present. The right ventricular systolic  pressure is approximated at 24.4 mmHg plus the right atrial pressure.  Pulmonary artery systolic pressure is normal.     Pulmonic Valve  The pulmonic valve cannot be assessed. Mild pulmonic insufficiency is present.     Vessels  The aorta root is normal. The pulmonary artery cannot be assessed. Dilation of  the inferior vena cava is present with abnormal respiratory variation in  diameter.     Compared to Previous Study  Previous study not available for comparison.     _____________________________________________________________________________  __  MMode/2D Measurements & Calculations  IVSd: 0.61 cm  LVIDd: 4.7 cm  LVIDs: 3.7 cm  LVPWd: 0.75 cm  FS: 21.7 %  LV mass(C)d: 99.1 grams  LV mass(C)dI: 45.0 grams/m2     Ao root diam: 2.9 cm  asc Aorta Diam: 2.5 cm  LVOT diam: 1.9 cm  LVOT area: 2.8 cm2     EF(MOD-bp): 21.5 %  LA Volume (BP): 84.8 ml  LA Volume Index (BP): 38.5 ml/m2  RWT: 0.32        Doppler Measurements & Calculations  PA acc time: 0.09 sec     PI end-d saumya: 154.0 cm/sec  TR max saumya: 247.0 cm/sec  TR max P.4 mmHg     _____________________________________________________________________________  __           Report approved by: Graciela Tomlinson 2020 12:05 PM      XR Chest Port 1 View    Narrative    XR CHEST PORT 1 VW  2020 4:21 PM      HISTORY: SG Placement    COMPARISON:  None available    FINDINGS: Single AP chest radiograph. [place] catheter tip is in the  proximal right main pulmonary artery. Right central line tip and right  upper extremity PICC line tip at the lower SVC. Trachea is midline,  cardiomegaly. Bilateral perihilar streaky opacities. Mild increased  interstitial opacities in the right lung with ill-defined pulmonary  vascularity. No pneumothorax or pleural effusion. No acute osseous or  abdominal abnormality. Elevated left hemidiaphragm.      Impression    IMPRESSION:  1. [place] catheter tip at the proximal right main pulmonary artery.  2. Cardiomegaly with mild pulmonary edema, especially on the right.    I have personally reviewed the examination and initial interpretation  and I agree with the findings.    DONG SOLORIO MD   Cardiac Catheterization    Narrative      Successful insertion of a IABP in the RFA                Medications

## 2020-03-09 NOTE — PROGRESS NOTES
LVAD Social Work Services Progress Note      Date of Initial Social Work Evaluation: 02/10/2020  Collaborated with: Pt at bedside     Data: Pt s/p HM3 on 02/18. Post-op course complicated by significant bleeding and most recently, hemothorax requiring chest tube. Pt remains on 4E until bed becomes available on 6C. Writer to provide supportive visit after significant medical events last week. Pt's wife not at bedside but plans to return tomorrow.   Intervention: Supportive visit   Assessment: Writer followed up after reviewing chart from last week. Pt reported a difficult week and experienced frustration surrounding setback. Pt feeling much better today, and hopeful for continued progress. Writer reminded Pt of previously discussed ups/downs and normalized frustration. Pt excited to see his oldest son (Antwan) and his grandchildren this weekend. Writer informed Pt that ARU is still recommended - Pt agreeable at this time and denied additional questions. Pt's wife had to return to [place] for medical appt. Pt requested another gas card - Writer will provide as extended hospitalization continues to be financial stressor.   Education provided by SW: Ongoing SW support, encouraged Pt to attend support group when medical cleared  Plan:    Discharge Plans in Progress: [facility] ARU    Barriers to d/c plan: Medical clearance     Follow up Plan: SW will continue to follow. Writer to provide gas card today or tomorrow.    WENCESLAO White  MSW Student   Pager [phone]

## 2020-03-09 NOTE — PROGRESS NOTES
CVICU PROGRESS NOTE  03/09/2020      SUBJECTIVE:    ASSESSMENT:  Mr Eliseo Tanner is a 65yo M w/PMHx notable for chronic systolic heart failure, NICM, moderate CAD, HTN, ABHINAV on CPAP, DM2, CKDIII who is transferred to [facility] for evaluation and management of advanced heart failure with arrhythmia now s/p HM 3 on 2/18. 3/5 had hemothorax requiring a chest tube and VATS.     Changes:   - Follow chromogenic factor 10 - hold bivalirudin starting tomorrow if remains at goal; bridging to warfarin  - Transfer to floor    PLAN:  Neuro:   - Neuro intact  - Pain; tylenol and oxycodone, lidocaine patches ordered   - Depression; fluoxetine     CV:   - Hx of chronic systolic heart failure with EF 15-20%  - Atrial Fibrillation     - EP Consult, cardioversion 2/28. Amio 400 mg PO BID with taper, digoxin 125 mcg.    - HMIII LVAD     - ASA 81 mg, atorvastatin 20 mg.     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  Coumadin goal INR 2-3.     - INR 2.97, goal 2-3  -bival and warfarin currently, following chromogenic factor   - HTN; hydralazine 100 q 8 hrs, lisinopril 5 mg  - VT; EP recommended ICD placement prior to discharge. EP working on time  -hemothorax              Chest tube placed   VATS 3/6, extensive clot burden removed      Pulm:   - Hx ABHINAV on CPAP   - Pulm toilet, IS, activity and deep breathing   - Supplemental O2 PRN to keep sats > 92%. Wean off as tolerated.      ID:   - WBC WNL, afebrile, no signs or symptoms of infection   - Bacteremia 2/13 and 2/15; ampicillin 2 grams q 6 hrs (14 days after removal of all central venous catheters (2/26 - 3/11))   - ID recommends surveillance cultures every week x 4 weeks after stopping all antibiotics (note on 2/25/20). Repeat cultures for surveillance drawn 3/3, NGTD.      GI / FEN:  - Reg diet, bowel regimen  - Hx GERD.      Renal / :   - CKD III; creatinine 1.23, adequate UOP.   - lasix 40 IV.      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      Endo:   - Hx Gout; allopurinol 200 mg daily   - T2DM;  sliding scale insulin and lantus 10 units (change lantus 10 units to q hs)       Disposition:  -ok to go to the floor following thoracic surgery     OBJECTIVE:   Vitals:  Temp:  [36.5  C (97.7  F)-36.9  C (98.5  F)] 36.5  C (97.7  F)  Heart Rate:  [75-96] 84  Resp:  [18-21] 20  BP: ()/(72-93) 98/72  SpO2:  [90 %-99 %] 92 %    Physical Exam:  General: Alert, well-appearing in no acute distress.  HEENT: Normocephalic, atraumatic.  Chest wall: Symmetric thorax. No masses. Chest site c/d/i  Respiratory: Non-labored breathing. Lung sounds clear to auscultation bilaterally. Chest tube in place   Cardiovascular: Regular rate and rhythm.   Gastrointestinal: Abdomen soft, non-distended, non-tender to palpation.   Extremities: Moving all four extremities. No limb deformities. No pedal edema.   Skin: As noted above. No rashes or lesions appreciated.       I&O's:  I/O last 3 completed shifts:  In: 1917.2 [P.O.:1370; I.V.:547.2]  Out: 3240 [Urine:2610; Chest Tube:630]    Labs:  BMP  Recent Labs   Lab 03/09/20 0443 03/09/20  0001 03/08/20 0527 03/07/20 0341 03/06/20  1615 03/06/20  0322     --  135 135 137 135   POTASSIUM 3.7  --  3.8 4.8 4.1 4.1   CHLORIDE 98  --  100 103  --  104   CALOS 7.9*  --  7.8* 7.7*  --  8.0*   CO2 33*  --  29 29  --  30   BUN 21 Canceled, Test credited 25 18  --  16   CR 0.92 Canceled, Test credited 1.11 1.03  --  1.02   *  --  156* 133* 123* 149*     CBC  Recent Labs   Lab 03/09/20  0443 03/08/20  0527 03/07/20  0341 03/06/20  1615 03/06/20  0322   WBC 10.5 12.2* 14.2*  --  10.7   RBC 2.64* 2.59* 2.84*  --  2.78*   HGB 7.6* 7.4* 8.1* 8.0* 8.0*   HCT 25.4* 24.7* 26.8*  --  26.5*   MCV 96 95 94  --  95   MCH 28.8 28.6 28.5  --  28.8   MCHC 29.9* 30.0* 30.2*  --  30.2*   RDW 16.6* 16.4* 16.6*  --  16.4*   * 431 409  --  392     INR  Recent Labs   Lab 03/09/20  0443 03/08/20  0527 03/07/20  0341 03/06/20  0322   INR 2.29* 2.27* 1.66* 1.45*      Hepatic Panel   Recent Labs   Lab  03/04/20  0757   AST 20   ALT 21   ALKPHOS 106   BILITOTAL 0.3   ALBUMIN 1.8*     Glucose  Recent Labs   Lab 03/09/20  0917 03/09/20  0443 03/09/20  0335 03/08/20  2241 03/08/20  1701 03/08/20  1206 03/08/20  0942  03/08/20  0527  03/07/20  0341  03/06/20  1615  03/06/20  0322  03/05/20  0434   GLC  --  122*  --   --   --   --   --   --  156*  --  133*  --  123*  --  149*  --  121*   *  --  131* 141* 189* 181* 148*   < >  --    < >  --    < >  --    < >  --    < >  --     < > = values in this interval not displayed.     Blood Gas  Recent Labs   Lab 03/06/20  1615 03/06/20  1550   PH 7.41 7.39   PCO2 42 45   PO2 96 49*   HCO3 26 27   O2PER 65 65       Imaging:   Recent Results (from the past 24 hour(s))   XR Chest Port 1 View    Narrative    XR CHEST PORT 1 VW  3/9/2020 1:30 AM    History:  s/p R VATS.     Comparison: Chest radiograph dated 3/8/2020    Findings:   Semiupright AP chest radiograph. Trachea is in midline. Stable  cardiomegaly with LVAD. Right apical and basilar chest tubes,  unchanged. Status post a right apical hematoma evacuation. Right  midlung zone oval opacities in comparison with CT study likely  representing subsegmental atelectasis. No appreciable pneumothorax or  significant pleural effusion.      Impression    IMPRESSION:  1.  Stable supportive lines and tubes.  2.  Cardiomegaly with LVAD placement, stable.  3.  Right apical hemothorax evacuation. Right mid/lower lobe  subsegmental atelectasis, overall stable.    I have personally reviewed the examination and initial interpretation  and I agree with the findings.    KRYSTEN SOLIZ MD         Dispo:   - Floor    Discussed with ICU staff.     Pio Sher MD  Anesthesiology, PGY4  [phone]

## 2020-03-09 NOTE — PLAN OF CARE
Major Shift Events:  Ambulate nursing unit, diuresis with 40mg IV Furosemide.  Plan: Transfer to 6C. Will continue to monitor.  For vital signs and complete assessments, please see documentation flowsheets.      Marva Russo RN on 3/9/2020 at 5:05 PM    Report called at 1745 to KRISTEN Fischer on unit 6C. All questions and concerns addressed. Patient transferred via hospital bed. Patient's belongings taken with patient.    Marva Russo RN on 3/9/2020 at 7:23 PM

## 2020-03-09 NOTE — PLAN OF CARE
Nights:  TRES  Pt slept 6-8hrs overnight and A/O/4.  LVAD 5500 RPM's, Flows, 4's, PI 3.6, 4.3 muhammad.  BP low and at times very difficult to obtain reading.  Urine 600cc out overnight.   Argatroban gtt with PTT within range this am.  Pain is managed with dilaudid.  Afebrile with clear lungs.  Hgb remains in the 7.6 range similar to yesterday.  P:  Cont to assess and update MD with rounds.

## 2020-03-10 ENCOUNTER — APPOINTMENT (OUTPATIENT)
Dept: GENERAL RADIOLOGY | Facility: CLINIC | Age: 65
DRG: 001 | End: 2020-03-10
Attending: INTERNAL MEDICINE
Payer: MEDICARE

## 2020-03-10 ENCOUNTER — APPOINTMENT (OUTPATIENT)
Dept: OCCUPATIONAL THERAPY | Facility: CLINIC | Age: 65
DRG: 001 | End: 2020-03-10
Attending: INTERNAL MEDICINE
Payer: MEDICARE

## 2020-03-10 ENCOUNTER — APPOINTMENT (OUTPATIENT)
Dept: PHYSICAL THERAPY | Facility: CLINIC | Age: 65
DRG: 001 | End: 2020-03-10
Attending: INTERNAL MEDICINE
Payer: MEDICARE

## 2020-03-10 LAB
ANION GAP SERPL CALCULATED.3IONS-SCNC: 4 MMOL/L (ref 3–14)
APTT PPP: 81 SEC (ref 22–37)
BUN SERPL-MCNC: 26 MG/DL (ref 7–30)
CALCIUM SERPL-MCNC: 8 MG/DL (ref 8.5–10.1)
CHLORIDE SERPL-SCNC: 99 MMOL/L (ref 94–109)
CO2 SERPL-SCNC: 33 MMOL/L (ref 20–32)
CREAT SERPL-MCNC: 0.98 MG/DL (ref 0.66–1.25)
ERYTHROCYTE [DISTWIDTH] IN BLOOD BY AUTOMATED COUNT: 16.4 % (ref 10–15)
FACT X ACT/NOR PPP CHRO: 43 % (ref 70–130)
GFR SERPL CREATININE-BSD FRML MDRD: 80 ML/MIN/{1.73_M2}
GLUCOSE BLDC GLUCOMTR-MCNC: 106 MG/DL (ref 70–99)
GLUCOSE BLDC GLUCOMTR-MCNC: 111 MG/DL (ref 70–99)
GLUCOSE BLDC GLUCOMTR-MCNC: 130 MG/DL (ref 70–99)
GLUCOSE BLDC GLUCOMTR-MCNC: 170 MG/DL (ref 70–99)
GLUCOSE BLDC GLUCOMTR-MCNC: 173 MG/DL (ref 70–99)
GLUCOSE BLDC GLUCOMTR-MCNC: 183 MG/DL (ref 70–99)
GLUCOSE SERPL-MCNC: 117 MG/DL (ref 70–99)
HCT VFR BLD AUTO: 25.1 % (ref 40–53)
HGB BLD-MCNC: 7.6 G/DL (ref 13.3–17.7)
INR PPP: 2.23 (ref 0.86–1.14)
INTERPRETATION ECG - MUSE: NORMAL
MAGNESIUM SERPL-MCNC: 1.9 MG/DL (ref 1.6–2.3)
MCH RBC QN AUTO: 28.4 PG (ref 26.5–33)
MCHC RBC AUTO-ENTMCNC: 30.3 G/DL (ref 31.5–36.5)
MCV RBC AUTO: 94 FL (ref 78–100)
PLATELET # BLD AUTO: 464 10E9/L (ref 150–450)
POTASSIUM SERPL-SCNC: 3.9 MMOL/L (ref 3.4–5.3)
RBC # BLD AUTO: 2.68 10E12/L (ref 4.4–5.9)
SODIUM SERPL-SCNC: 136 MMOL/L (ref 133–144)
WBC # BLD AUTO: 9.6 10E9/L (ref 4–11)

## 2020-03-10 PROCEDURE — 25000132 ZZH RX MED GY IP 250 OP 250 PS 637: Mod: GY | Performed by: PHYSICIAN ASSISTANT

## 2020-03-10 PROCEDURE — 99233 SBSQ HOSP IP/OBS HIGH 50: CPT | Mod: GC | Performed by: INTERNAL MEDICINE

## 2020-03-10 PROCEDURE — 00000146 ZZHCL STATISTIC GLUCOSE BY METER IP

## 2020-03-10 PROCEDURE — 21400000 ZZH R&B CCU UMMC

## 2020-03-10 PROCEDURE — 25000132 ZZH RX MED GY IP 250 OP 250 PS 637: Mod: GY | Performed by: STUDENT IN AN ORGANIZED HEALTH CARE EDUCATION/TRAINING PROGRAM

## 2020-03-10 PROCEDURE — 97530 THERAPEUTIC ACTIVITIES: CPT | Mod: GP

## 2020-03-10 PROCEDURE — 25000128 H RX IP 250 OP 636: Performed by: STUDENT IN AN ORGANIZED HEALTH CARE EDUCATION/TRAINING PROGRAM

## 2020-03-10 PROCEDURE — 97110 THERAPEUTIC EXERCISES: CPT | Mod: GP

## 2020-03-10 PROCEDURE — 85610 PROTHROMBIN TIME: CPT | Performed by: THORACIC SURGERY (CARDIOTHORACIC VASCULAR SURGERY)

## 2020-03-10 PROCEDURE — 36415 COLL VENOUS BLD VENIPUNCTURE: CPT | Performed by: THORACIC SURGERY (CARDIOTHORACIC VASCULAR SURGERY)

## 2020-03-10 PROCEDURE — 83735 ASSAY OF MAGNESIUM: CPT | Performed by: THORACIC SURGERY (CARDIOTHORACIC VASCULAR SURGERY)

## 2020-03-10 PROCEDURE — 80048 BASIC METABOLIC PNL TOTAL CA: CPT | Performed by: THORACIC SURGERY (CARDIOTHORACIC VASCULAR SURGERY)

## 2020-03-10 PROCEDURE — 25000128 H RX IP 250 OP 636: Performed by: PHYSICIAN ASSISTANT

## 2020-03-10 PROCEDURE — 25000132 ZZH RX MED GY IP 250 OP 250 PS 637: Mod: GY | Performed by: THORACIC SURGERY (CARDIOTHORACIC VASCULAR SURGERY)

## 2020-03-10 PROCEDURE — 40000275 ZZH STATISTIC RCP TIME EA 10 MIN

## 2020-03-10 PROCEDURE — 25000128 H RX IP 250 OP 636: Performed by: SURGERY

## 2020-03-10 PROCEDURE — 71045 X-RAY EXAM CHEST 1 VIEW: CPT

## 2020-03-10 PROCEDURE — 85027 COMPLETE CBC AUTOMATED: CPT | Performed by: THORACIC SURGERY (CARDIOTHORACIC VASCULAR SURGERY)

## 2020-03-10 PROCEDURE — 85260 CLOT FACTOR X STUART-POWER: CPT | Performed by: THORACIC SURGERY (CARDIOTHORACIC VASCULAR SURGERY)

## 2020-03-10 PROCEDURE — 97110 THERAPEUTIC EXERCISES: CPT | Mod: GO | Performed by: OCCUPATIONAL THERAPIST

## 2020-03-10 PROCEDURE — 97535 SELF CARE MNGMENT TRAINING: CPT | Mod: GO | Performed by: OCCUPATIONAL THERAPIST

## 2020-03-10 PROCEDURE — 85730 THROMBOPLASTIN TIME PARTIAL: CPT | Performed by: THORACIC SURGERY (CARDIOTHORACIC VASCULAR SURGERY)

## 2020-03-10 PROCEDURE — 25800030 ZZH RX IP 258 OP 636: Performed by: STUDENT IN AN ORGANIZED HEALTH CARE EDUCATION/TRAINING PROGRAM

## 2020-03-10 RX ORDER — FUROSEMIDE 10 MG/ML
40 INJECTION INTRAMUSCULAR; INTRAVENOUS ONCE
Status: COMPLETED | OUTPATIENT
Start: 2020-03-10 | End: 2020-03-10

## 2020-03-10 RX ORDER — POTASSIUM CHLORIDE 750 MG/1
20 TABLET, EXTENDED RELEASE ORAL ONCE
Status: COMPLETED | OUTPATIENT
Start: 2020-03-10 | End: 2020-03-10

## 2020-03-10 RX ORDER — LISINOPRIL 2.5 MG/1
2.5 TABLET ORAL DAILY
Status: DISCONTINUED | OUTPATIENT
Start: 2020-03-10 | End: 2020-03-11

## 2020-03-10 RX ORDER — WARFARIN SODIUM 5 MG/1
5 TABLET ORAL
Status: COMPLETED | OUTPATIENT
Start: 2020-03-10 | End: 2020-03-10

## 2020-03-10 RX ADMIN — PANTOPRAZOLE SODIUM 40 MG: 40 TABLET, DELAYED RELEASE ORAL at 08:30

## 2020-03-10 RX ADMIN — FUROSEMIDE 40 MG: 10 INJECTION INTRAMUSCULAR; INTRAVENOUS at 10:23

## 2020-03-10 RX ADMIN — WARFARIN SODIUM 5 MG: 5 TABLET ORAL at 18:00

## 2020-03-10 RX ADMIN — AMPICILLIN SODIUM 2 G: 2 INJECTION, POWDER, FOR SOLUTION INTRAMUSCULAR; INTRAVENOUS at 14:44

## 2020-03-10 RX ADMIN — LISINOPRIL 2.5 MG: 2.5 TABLET ORAL at 18:00

## 2020-03-10 RX ADMIN — LIDOCAINE 2 PATCH: 560 PATCH PERCUTANEOUS; TOPICAL; TRANSDERMAL at 19:27

## 2020-03-10 RX ADMIN — BIVALIRUDIN 0.02 MG/KG/HR: 250 INJECTION INTRACAVERNOUS at 19:27

## 2020-03-10 RX ADMIN — INSULIN ASPART 2 UNITS: 100 INJECTION, SOLUTION INTRAVENOUS; SUBCUTANEOUS at 12:56

## 2020-03-10 RX ADMIN — FLUOXETINE 20 MG: 20 CAPSULE ORAL at 08:31

## 2020-03-10 RX ADMIN — METHOCARBAMOL 500 MG: 500 TABLET, FILM COATED ORAL at 20:23

## 2020-03-10 RX ADMIN — AMPICILLIN SODIUM 2 G: 2 INJECTION, POWDER, FOR SOLUTION INTRAMUSCULAR; INTRAVENOUS at 02:01

## 2020-03-10 RX ADMIN — DIGOXIN 125 MCG: 125 TABLET ORAL at 08:31

## 2020-03-10 RX ADMIN — POTASSIUM CHLORIDE 20 MEQ: 750 TABLET, EXTENDED RELEASE ORAL at 08:32

## 2020-03-10 RX ADMIN — HYDRALAZINE HYDROCHLORIDE 100 MG: 100 TABLET, FILM COATED ORAL at 14:44

## 2020-03-10 RX ADMIN — AMIODARONE HYDROCHLORIDE 200 MG: 200 TABLET ORAL at 08:29

## 2020-03-10 RX ADMIN — Medication 200 MG: at 08:29

## 2020-03-10 RX ADMIN — POTASSIUM CHLORIDE 20 MEQ: 750 TABLET, EXTENDED RELEASE ORAL at 10:23

## 2020-03-10 RX ADMIN — HYDRALAZINE HYDROCHLORIDE 100 MG: 100 TABLET, FILM COATED ORAL at 05:47

## 2020-03-10 RX ADMIN — MAGNESIUM SULFATE 2 G: 2 INJECTION INTRAVENOUS at 09:34

## 2020-03-10 RX ADMIN — MULTIPLE VITAMINS W/ MINERALS TAB 1 TABLET: TAB at 08:30

## 2020-03-10 RX ADMIN — INSULIN ASPART 2 UNITS: 100 INJECTION, SOLUTION INTRAVENOUS; SUBCUTANEOUS at 22:18

## 2020-03-10 RX ADMIN — HYDROMORPHONE HYDROCHLORIDE 2 MG: 2 TABLET ORAL at 08:44

## 2020-03-10 RX ADMIN — AMPICILLIN SODIUM 2 G: 2 INJECTION, POWDER, FOR SOLUTION INTRAMUSCULAR; INTRAVENOUS at 08:31

## 2020-03-10 RX ADMIN — AMPICILLIN SODIUM 2 G: 2 INJECTION, POWDER, FOR SOLUTION INTRAMUSCULAR; INTRAVENOUS at 19:17

## 2020-03-10 RX ADMIN — HYDRALAZINE HYDROCHLORIDE 100 MG: 100 TABLET, FILM COATED ORAL at 22:20

## 2020-03-10 RX ADMIN — ALLOPURINOL 200 MG: 100 TABLET ORAL at 08:43

## 2020-03-10 RX ADMIN — INSULIN ASPART 2 UNITS: 100 INJECTION, SOLUTION INTRAVENOUS; SUBCUTANEOUS at 18:05

## 2020-03-10 RX ADMIN — MAGNESIUM OXIDE 400 MG: 400 TABLET ORAL at 08:30

## 2020-03-10 RX ADMIN — ATORVASTATIN CALCIUM 20 MG: 20 TABLET, FILM COATED ORAL at 19:14

## 2020-03-10 RX ADMIN — Medication 0.2 MG: at 16:39

## 2020-03-10 ASSESSMENT — ACTIVITIES OF DAILY LIVING (ADL)
ADLS_ACUITY_SCORE: 15
ADLS_ACUITY_SCORE: 15
ADLS_ACUITY_SCORE: 16
ADLS_ACUITY_SCORE: 15
ADLS_ACUITY_SCORE: 16
ADLS_ACUITY_SCORE: 15

## 2020-03-10 ASSESSMENT — MIFFLIN-ST. JEOR: SCORE: 1706.61

## 2020-03-10 ASSESSMENT — PAIN DESCRIPTION - DESCRIPTORS: DESCRIPTORS: DISCOMFORT

## 2020-03-10 NOTE — PLAN OF CARE
D: Patient s/p HM3 2/18, transferred to  this evening.  I/A: AxOx4, slightly [place], pleasant, doppler BP Maps, RA during day, 2L home Cpap at night. Rhythm aflutter 90s-100s. Bilvalirudin@0.02mg/kg/hr, rate therapuetic, next PTT tomorrow. Intermittent IV abx. Right sided CTs y'd to cannister to sxn with fair serosang output. Pain noted at site declined PRN pain meds overnight. HM3@5500 without alarms, dressing intact.  P: LVAD teaching scheduled for today 2-4pm. Continue to monitor patient. Notify CVTS with pertinent changes.

## 2020-03-10 NOTE — PLAN OF CARE
Discharge Planner PT   Patient plan for discharge: rehab  Current status: Session focus on room set up for recliner to increase OOB activity. Jose supine>sit, modA sit<>stand to FWW with assist for line/tube management over to recliner. Tolerates seated LE exercises well. VSS and VAD setting WNL, denies dizziness or SOB with activity.  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 He demos multidisciplinary needs as new LVAD and anticipate best rehab potential/maximize gains with intense rehab stay. He tolerate therapy well, anticipate will be able to complete 3 hours daily PT/OT.       Entered by: Marjan Cruz 03/10/2020 10:43 AM

## 2020-03-10 NOTE — PROGRESS NOTES
Advanced Heart Failure Consult Progress Note  Eliseo Tanner MRN: [id number]  Age: 64 year old, : 1955  Date: 2020                    Assessment and Plan:     Mr Eliseo Tanner is a 63yo M w/PMHx notable for chronic systolic heart failure, NICM, moderate CAD, HTN, ABHINAV on CPAP, DM2, CKDIII who is transferred to [facility] for evaluation and management of advanced heart failure with arrhythmia now s/p HM 3 on .    Today's plan (3/10)  - No additional diuresis   - Will maintain bival because of chromogenic factor>40  -Start Lisinopril 5mg qday     Moderate CAD  Severe Mitral regurgitation  Chronic nonischemic systolic heart failure w/ reduced EF (15-20%) and exacerbation  New atrial fibrillation  NYHA Class III. Echo 2020: LVEF 15-20%; LVEDd 5.9 cm; 4+ MR. C 2019 w/ nonobstructive CAD w/ severe branch disease.Presented with increased wt gain, SOB, LE edema concerning for HF exacerbation, initially concerning for cardiogenic shock. Admitted to [facility] for further evaluation regarding need for advanced HF therapies. Patient is now s/p HM III placement on  with early post-op course complicated by bleeding that is slowing down as of  AM. Patient is persistently tachycardic to 130-140 with a wide-complex mostly-regular tachycardia. ECG obtained difficult to interpret given LVAD artifact, but afib w/ RVR and aberrancy vs. nursing home vs. AT vs. Slow VT. S/p DCCV on  back into sinus rhythm. With patient's history of bleeding, will hold ASA presently.   -Hold ASA 81  - Continue atorvastatin 20 mg  -Continue coumadin with goal INR 2-3, will maintain bivalirudin due to chromogenic factor>40  -Agree with continuing Hydral 100mg TID for afterload reduction   - Start lisinopril 5mg qday   -LVAD speed at 5500 RPM    #Retrosternal hematoma:  As seen on  CT chest at 5.1 cm. S/P chest tube per CVTS on 3/5. Chest tube drainage decreasing presently and HGB holding steady, low  "concern for bleeding presently. That being said, will discontinue ASA while patient is on bival+Coumadin     Proteus and Enterococcus bacteremia  Organisms growing from 2/2 blood cultures from 2/13. Blood cultured from 2/16 NGTD and 2/15 growing 1/2 S.epi, likely contaminant per ID.  ID consulted, appreciate help. Will decide on final duration of abx.  -daily blood cultures until negative  -Zosyn (2/13-2/14)  -Tobramycin (2/13-2/14)  -Vancomycin (2/13-2/17  -Meropenem (2/14-2/15)  -Ceftriaxone (2/16-2/28)  -Ampicillin (2/16-3/11) (14 days after swan removal on 2/26)    #Thrombocytopenia:  Concern for HIT given timing with respect to heparin exposure. HIT positive DAVINA negative. Antibody is now negative x2, thus no further indication for PLAX  -Heme consulted  -Continuing Bivalirudin gtt after LVAD surgery,goal chromogenic level 20-40 (given hemothorax)    VFib requiring shock  Shocked x 3 prior to transfer, loaded with amio. No known prior history. Suspect related to underlying HF.   -Keep K>4, Mg>2  -Amio 400 mg PO QD  -Needs ICD for secondary prevention, will need to discuss with EP     Atrial flutter   S/p cardioversion 2/28/20   - On Coumadin+Bivalirudin as above.     Sonia Portillo Md  Cardiology Fellow  PGY4              Subjective/Interval Events     NAEON  Nursing notes reviewed             Objective     BP 93/82 (BP Location: Left arm)   Pulse 72   Temp 98.3  F (36.8  C) (Oral)   Resp 18   Ht 1.702 m (5' 7\")   Wt 96.8 kg (213 lb 6.4 oz)   SpO2 95%   BMI 33.42 kg/m    Temp:  [97.7  F (36.5  C)-98.9  F (37.2  C)] 98.3  F (36.8  C)  Pulse:  [72] 72  Heart Rate:  [] 84  Resp:  [16-25] 18  BP: ()/(72-90) 93/82  SpO2:  [92 %-98 %] 95 %  Wt Readings from Last 2 Encounters:   03/10/20 96.8 kg (213 lb 6.4 oz)     I/O last 3 completed shifts:  In: 1332.36 [P.O.:1040; I.V.:292.36]  Out: 3615 [Urine:3155; Chest Tube:460]      Gen: No acute distress  HEENT: NC/AT, +JVD   PULM/THORAX: CTAB  CV: normal " rate, regular rhythm, normal S1 and S2, LVAD hum  ABD: Soft, NTND, bowel sounds present, no masses  EXT: WWP. 1+ pitting LE edema, clubbing or cyanosis.  NEURO: A&Ox3                Data:     Recent Results (from the past 24 hour(s))   Glucose by meter    Collection Time: 03/09/20  9:17 AM   Result Value Ref Range    Glucose 109 (H) 70 - 99 mg/dL   Glucose by meter    Collection Time: 03/09/20 12:52 PM   Result Value Ref Range    Glucose 160 (H) 70 - 99 mg/dL   Glucose by meter    Collection Time: 03/09/20  5:56 PM   Result Value Ref Range    Glucose 160 (H) 70 - 99 mg/dL   Glucose by meter    Collection Time: 03/09/20  9:14 PM   Result Value Ref Range    Glucose 173 (H) 70 - 99 mg/dL   EKG 12-lead, complete    Collection Time: 03/09/20 10:06 PM   Result Value Ref Range    Interpretation ECG Click View Image link to view waveform and result    Glucose by meter    Collection Time: 03/10/20  2:00 AM   Result Value Ref Range    Glucose 130 (H) 70 - 99 mg/dL   Basic metabolic panel    Collection Time: 03/10/20  5:38 AM   Result Value Ref Range    Sodium 136 133 - 144 mmol/L    Potassium 3.9 3.4 - 5.3 mmol/L    Chloride 99 94 - 109 mmol/L    Carbon Dioxide 33 (H) 20 - 32 mmol/L    Anion Gap 4 3 - 14 mmol/L    Glucose 117 (H) 70 - 99 mg/dL    Urea Nitrogen 26 7 - 30 mg/dL    Creatinine 0.98 0.66 - 1.25 mg/dL    GFR Estimate 80 >60 mL/min/[1.73_m2]    GFR Estimate If Black >90 >60 mL/min/[1.73_m2]    Calcium 8.0 (L) 8.5 - 10.1 mg/dL   CBC with platelets    Collection Time: 03/10/20  5:38 AM   Result Value Ref Range    WBC 9.6 4.0 - 11.0 10e9/L    RBC Count 2.68 (L) 4.4 - 5.9 10e12/L    Hemoglobin 7.6 (L) 13.3 - 17.7 g/dL    Hematocrit 25.1 (L) 40.0 - 53.0 %    MCV 94 78 - 100 fl    MCH 28.4 26.5 - 33.0 pg    MCHC 30.3 (L) 31.5 - 36.5 g/dL    RDW 16.4 (H) 10.0 - 15.0 %    Platelet Count 464 (H) 150 - 450 10e9/L   INR    Collection Time: 03/10/20  5:38 AM   Result Value Ref Range    INR 2.23 (H) 0.86 - 1.14   Magnesium     Collection Time: 03/10/20  5:38 AM   Result Value Ref Range    Magnesium 1.9 1.6 - 2.3 mg/dL   Partial thromboplastin time    Collection Time: 03/10/20  5:38 AM   Result Value Ref Range    PTT 81 (H) 22 - 37 sec   Glucose by meter    Collection Time: 03/10/20  5:46 AM   Result Value Ref Range    Glucose 111 (H) 70 - 99 mg/dL   Glucose by meter    Collection Time: 03/10/20  7:19 AM   Result Value Ref Range    Glucose 106 (H) 70 - 99 mg/dL       Recent Results (from the past 24 hour(s))   Echocardiogram Complete    Narrative    [id number]  [id number]  [id number]  [id number]^JIM           [facility],[place]  Echocardiography Laboratory  [address] [place] [id number]     Name: WOLFGANG LEACH  MRN: [id number]  : 1955  Study Date: 2020 10:06 AM  Age: 64 yrs  Gender: Male  Patient Location: [facility]  Reason For Study: Heart Failure  Ordering Physician: NAHUN GUTIERREZ  Referring Physician: SELF, REFERRED  Performed By: Yvonne Adan RDCS     BSA: 2.2 m2  Height: 67 in  Weight: 244 lb  HR: 103  BP: 72/52 mmHg  _____________________________________________________________________________  __        Procedure  Complete Portable Echo Adult. Contrast Optison. Optison (NDC #9234-5952-44)  given intravenously. Patient was given 6 ml mixture of 3 ml Optison and 6 ml  saline. 3 ml wasted.  _____________________________________________________________________________  __        Interpretation Summary  Left ventricular size is normal.  LVEF 20% based on biplane 2D tracing.  Mild to moderate right ventricular dilation is present.  Global right ventricular function is moderately reduced.  Pulmonary artery systolic pressure is normal.  Dilation of the inferior vena cava is present with abnormal respiratory  variation in diameter.  _____________________________________________________________________________  __        Left Ventricle  Left ventricular size is normal. LVEF 20% based  on biplane 2D tracing. Left  ventricular wall thickness is normal. Diastolic function not assessed due to  frequent ectopy. Abnormal septal motion consistent with left bundle branch  block is present.     Right Ventricle  Mild to moderate right ventricular dilation is present. Global right  ventricular function is moderately reduced.     Atria  Mild biatrial enlargement is present.     Mitral Valve  Moderate mitral insufficiency is present.        Aortic Valve  Aortic valve is normal in structure and function.     Tricuspid Valve  Moderate tricuspid insufficiency is present. The right ventricular systolic  pressure is approximated at 24.4 mmHg plus the right atrial pressure.  Pulmonary artery systolic pressure is normal.     Pulmonic Valve  The pulmonic valve cannot be assessed. Mild pulmonic insufficiency is present.     Vessels  The aorta root is normal. The pulmonary artery cannot be assessed. Dilation of  the inferior vena cava is present with abnormal respiratory variation in  diameter.     Compared to Previous Study  Previous study not available for comparison.     _____________________________________________________________________________  __  MMode/2D Measurements & Calculations  IVSd: 0.61 cm  LVIDd: 4.7 cm  LVIDs: 3.7 cm  LVPWd: 0.75 cm  FS: 21.7 %  LV mass(C)d: 99.1 grams  LV mass(C)dI: 45.0 grams/m2     Ao root diam: 2.9 cm  asc Aorta Diam: 2.5 cm  LVOT diam: 1.9 cm  LVOT area: 2.8 cm2     EF(MOD-bp): 21.5 %  LA Volume (BP): 84.8 ml  LA Volume Index (BP): 38.5 ml/m2  RWT: 0.32        Doppler Measurements & Calculations  PA acc time: 0.09 sec     PI end-d saumya: 154.0 cm/sec  TR max saumya: 247.0 cm/sec  TR max P.4 mmHg     _____________________________________________________________________________  __           Report approved by: Graciela Tomlinson 2020 12:05 PM      XR Chest Port 1 View    Narrative    XR CHEST PORT 1 VW  2020 4:21 PM      HISTORY: SG Placement    COMPARISON: None  available    FINDINGS: Single AP chest radiograph. [place] catheter tip is in the  proximal right main pulmonary artery. Right central line tip and right  upper extremity PICC line tip at the lower SVC. Trachea is midline,  cardiomegaly. Bilateral perihilar streaky opacities. Mild increased  interstitial opacities in the right lung with ill-defined pulmonary  vascularity. No pneumothorax or pleural effusion. No acute osseous or  abdominal abnormality. Elevated left hemidiaphragm.      Impression    IMPRESSION:  1. [place] catheter tip at the proximal right main pulmonary artery.  2. Cardiomegaly with mild pulmonary edema, especially on the right.    I have personally reviewed the examination and initial interpretation  and I agree with the findings.    DONG SOLORIO MD   Cardiac Catheterization    Narrative      Successful insertion of a IABP in the RFA                Medications

## 2020-03-10 NOTE — PROGRESS NOTES
"CVTS Daily Note  3/10/2020  Attending: Mac Jaramillo, *    S:   No overnight events.  Up from ICU last night.   Pt seen at bedside resting comfortably.    Does complain of R lateral chest wall incision pain, otherwise no acute complaints.      Denies F/C/Sweats.  No CP, SOB, or calf pain.    Tolerating diet but not very hungry.  Last BM about 2-3 days ago.  + Flatus.    Ambulated well with assistance.    Pain level tolerable with medications. Plan as per Neuro section below.     O:   Vital signs:  Temp: 98.3  F (36.8  C) Temp src: Oral BP: 93/82 Pulse: 72 Heart Rate: 84 Resp: 18 SpO2: 95 % O2 Device: None (Room air) Oxygen Delivery: 2 LPM Height: (170 cm entered into optia) Weight: 96.8 kg (213 lb 6.4 oz)  Estimated body mass index is 33.42 kg/m  as calculated from the following:    Height as of this encounter: 1.702 m (5' 7\").    Weight as of this encounter: 96.8 kg (213 lb 6.4 oz).    Weight; overall stable past 7 days, slight trend down.   24 hr Fluid status; net loss 2.4 L.     MAPs: 89 - 98  Gen: AAO x 3, pleasant, NAD  CV: VAD humming, RRR, S1S2 normal, no murmurs, rubs, or gallops.   Pulm: CTA, no rhonchi or wheezes  Abd: soft, non-tender, no guarding  Ext: moderate peripheral edema, 2 + pitting  Incision: clean, dry, intact, no erythema  Chest Tube sites: dressings clean and dry, serosanguinous, no air leak, output 620 cc   Lines: driveline dressing clean and dry   LVAD: speed 5500, flow 4.2 - 4.3, PI 3.4 - 3.6, power 4.2 - 4.3       Labs:  [facility]  Recent Labs   Lab 03/10/20  0538 03/09/20  0443 03/09/20  0001 03/08/20  0527 03/07/20  0341    134  --  135 135   POTASSIUM 3.9 3.7  --  3.8 4.8   CHLORIDE 99 98  --  100 103   CALOS 8.0* 7.9*  --  7.8* 7.7*   CO2 33* 33*  --  29 29   BUN 26 21 Canceled, Test credited 25 18   CR 0.98 0.92 Canceled, Test credited 1.11 1.03   * 122*  --  156* 133*     CBC  Recent Labs   Lab 03/10/20  0538 03/09/20  0443 03/08/20  0527 03/07/20  0341   WBC 9.6 10.5 " 12.2* 14.2*   RBC 2.68* 2.64* 2.59* 2.84*   HGB 7.6* 7.6* 7.4* 8.1*   HCT 25.1* 25.4* 24.7* 26.8*   MCV 94 96 95 94   MCH 28.4 28.8 28.6 28.5   MCHC 30.3* 29.9* 30.0* 30.2*   RDW 16.4* 16.6* 16.4* 16.6*   * 463* 431 409     INR  Recent Labs   Lab 03/10/20  0538 03/09/20  0443 03/08/20  0527 03/07/20  0341   INR 2.23* 2.29* 2.27* 1.66*      Hepatic Panel   Lab Results   Component Value Date    AST 20 03/04/2020     Lab Results   Component Value Date    ALT 21 03/04/2020     Lab Results   Component Value Date    ALBUMIN 1.8 03/04/2020     GLUCOSE:   Recent Labs   Lab 03/10/20  0719 03/10/20  0546 03/10/20  0538 03/10/20  0200 03/09/20  2114 03/09/20  1756 03/09/20  1252  03/09/20  0443  03/08/20  0527  03/07/20  0341  03/06/20  1615  03/06/20  0322   GLC  --   --  117*  --   --   --   --   --  122*  --  156*  --  133*  --  123*  --  149*   * 111*  --  130* 173* 160* 160*   < >  --    < >  --    < >  --    < >  --    < >  --     < > = values in this interval not displayed.       Imaging:  reviewed recent imaging, R chest stable with small pockets of fluid, lung expanded well.       ASSESSMENT:  Mr Eliseo Tanner is a 63yo M w/PMHx notable for chronic systolic heart failure, NICM, moderate CAD, HTN, ABHINAV on CPAP, DM2, CKDIII who is transferred to [facility] for evaluation and management of advanced heart failure with arrhythmia now s/p HM 3 on 2/18. 3/5 had hemothorax requiring a chest tube. Thoracic surgery will operate on him 3/5.      Changes:   Hold asa for now per cards for bleeding risk      PLAN:  Neuro:   - Neuro intact  - Pain; tylenol and oxycodone, lidocaine patches    - Depression; fluoxetine     CV:   - Hx of chronic systolic heart failure with EF 15-20%  - Atrial Fibrillation     - EP Consult, cardioversion 2/28.      - Amio 200 mg PO daily, digoxin 125 mcg  - HMIII LVAD     - Atorvastatin 20 mg. Holding ASA 81 mg due to recent bleeding.    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  Coumadin goal INR 2-3.     - INR 2.23, goal  2-3.  CGF10 43% (goal 20-40%)   - Bivalirudin gtt and warfarin currently, following chromogenic factor (39%)  - HTN; hydralazine 100 q 8 hrs, lisinopril 5 mg  - VT; EP recommended ICD placement prior to discharge. EP working on time  - Hemothorax      - Chest tube placed      - VATS 3/6, extensive clot burden removed      Pulm:   - Hx ABHINAV on CPAP   - Pulm toilet, IS, activity and deep breathing   - Supplemental O2 PRN to keep sats > 92%. Wean off as tolerated.      ID:   - WBC WNL, afebrile, no signs or symptoms of infection   - Bacteremia 2/13 and 2/15; ampicillin 2 grams q 6 hrs (14 days after removal of all central venous catheters (2/26 - 3/11))   - ID recommends surveillance cultures every week x 4 weeks after stopping all antibiotics (note on 2/25/20). Repeat cultures for surveillance drawn 3/3, NGTD.      GI / FEN:  - Reg diet, bowel regimen  - Hx GERD.      Renal / :   - CKD III; creatinine WNL, adequate UOP.   - Lasix 40 mg IV on 3/10.       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      Endo:   - Hx Gout; allopurinol 200 mg daily   - T2DM; sliding scale insulin and lantus 10 units q HS       Disposition:  - 6C again since 3/9  - TCU per PT team recs   - Needs ICD placed before discharge.       Staff surgeons have been informed of changes through both  verbal and written communication.      Diego Zuñiga PA-C  Cardiothoracic Surgery  Pager [phone]    8:55 AM   March 10, 2020

## 2020-03-10 NOTE — PROGRESS NOTES
Transfer  D: Patient s/p HM3 2/18, transferred to  this evening.  I/A: AxOx4, slightly [place], pleasant, doppler BP Maps, RA during day, 2L Cpap at night. Rhythm initially thought (and reported from 4E)  to be SR with PACs, but MT confirmed with lead changed that patient is in aflutter 90s-100s.  Cross cover notified. Bilvalirudin@0.02mg/kg/hr, rate therapuetic, next PTT tomorrow. Intermittent IV abx. Right sided CTs y'd to cannister to sxn with fair serosang output. Pain noted at site and PO dilaudid and tylenol given. HM3@5500 without alarms, dressing intact.  P: LVAD teaching scheduled for Tuesday 2-4pm.

## 2020-03-11 ENCOUNTER — APPOINTMENT (OUTPATIENT)
Dept: PHYSICAL THERAPY | Facility: CLINIC | Age: 65
DRG: 001 | End: 2020-03-11
Attending: INTERNAL MEDICINE
Payer: MEDICARE

## 2020-03-11 ENCOUNTER — APPOINTMENT (OUTPATIENT)
Dept: GENERAL RADIOLOGY | Facility: CLINIC | Age: 65
DRG: 001 | End: 2020-03-11
Attending: PHYSICIAN ASSISTANT
Payer: MEDICARE

## 2020-03-11 ENCOUNTER — APPOINTMENT (OUTPATIENT)
Dept: OCCUPATIONAL THERAPY | Facility: CLINIC | Age: 65
DRG: 001 | End: 2020-03-11
Attending: INTERNAL MEDICINE
Payer: MEDICARE

## 2020-03-11 LAB
ANION GAP SERPL CALCULATED.3IONS-SCNC: 4 MMOL/L (ref 3–14)
APTT PPP: 78 SEC (ref 22–37)
BUN SERPL-MCNC: 24 MG/DL (ref 7–30)
CALCIUM SERPL-MCNC: 7.9 MG/DL (ref 8.5–10.1)
CHLORIDE SERPL-SCNC: 101 MMOL/L (ref 94–109)
CO2 SERPL-SCNC: 30 MMOL/L (ref 20–32)
CREAT SERPL-MCNC: 0.95 MG/DL (ref 0.66–1.25)
ERYTHROCYTE [DISTWIDTH] IN BLOOD BY AUTOMATED COUNT: 16.7 % (ref 10–15)
FACT X ACT/NOR PPP CHRO: 42 % (ref 70–130)
GFR SERPL CREATININE-BSD FRML MDRD: 83 ML/MIN/{1.73_M2}
GLUCOSE BLDC GLUCOMTR-MCNC: 121 MG/DL (ref 70–99)
GLUCOSE BLDC GLUCOMTR-MCNC: 126 MG/DL (ref 70–99)
GLUCOSE BLDC GLUCOMTR-MCNC: 138 MG/DL (ref 70–99)
GLUCOSE BLDC GLUCOMTR-MCNC: 139 MG/DL (ref 70–99)
GLUCOSE BLDC GLUCOMTR-MCNC: 151 MG/DL (ref 70–99)
GLUCOSE BLDC GLUCOMTR-MCNC: 175 MG/DL (ref 70–99)
GLUCOSE SERPL-MCNC: 132 MG/DL (ref 70–99)
HCT VFR BLD AUTO: 25.9 % (ref 40–53)
HGB BLD-MCNC: 7.7 G/DL (ref 13.3–17.7)
INR PPP: 2.29 (ref 0.86–1.14)
MAGNESIUM SERPL-MCNC: 2 MG/DL (ref 1.6–2.3)
MCH RBC QN AUTO: 27.9 PG (ref 26.5–33)
MCHC RBC AUTO-ENTMCNC: 29.7 G/DL (ref 31.5–36.5)
MCV RBC AUTO: 94 FL (ref 78–100)
PLATELET # BLD AUTO: 482 10E9/L (ref 150–450)
POTASSIUM SERPL-SCNC: 4 MMOL/L (ref 3.4–5.3)
RBC # BLD AUTO: 2.76 10E12/L (ref 4.4–5.9)
SODIUM SERPL-SCNC: 136 MMOL/L (ref 133–144)
WBC # BLD AUTO: 10.5 10E9/L (ref 4–11)

## 2020-03-11 PROCEDURE — 25000128 H RX IP 250 OP 636: Performed by: STUDENT IN AN ORGANIZED HEALTH CARE EDUCATION/TRAINING PROGRAM

## 2020-03-11 PROCEDURE — 25000132 ZZH RX MED GY IP 250 OP 250 PS 637: Mod: GY | Performed by: PHYSICIAN ASSISTANT

## 2020-03-11 PROCEDURE — 97110 THERAPEUTIC EXERCISES: CPT | Mod: GP

## 2020-03-11 PROCEDURE — 85027 COMPLETE CBC AUTOMATED: CPT | Performed by: THORACIC SURGERY (CARDIOTHORACIC VASCULAR SURGERY)

## 2020-03-11 PROCEDURE — 25000128 H RX IP 250 OP 636: Performed by: PHYSICIAN ASSISTANT

## 2020-03-11 PROCEDURE — 25000132 ZZH RX MED GY IP 250 OP 250 PS 637: Mod: GY | Performed by: THORACIC SURGERY (CARDIOTHORACIC VASCULAR SURGERY)

## 2020-03-11 PROCEDURE — 99233 SBSQ HOSP IP/OBS HIGH 50: CPT | Mod: GC | Performed by: INTERNAL MEDICINE

## 2020-03-11 PROCEDURE — 85260 CLOT FACTOR X STUART-POWER: CPT | Performed by: THORACIC SURGERY (CARDIOTHORACIC VASCULAR SURGERY)

## 2020-03-11 PROCEDURE — 85610 PROTHROMBIN TIME: CPT | Performed by: THORACIC SURGERY (CARDIOTHORACIC VASCULAR SURGERY)

## 2020-03-11 PROCEDURE — 36415 COLL VENOUS BLD VENIPUNCTURE: CPT | Performed by: THORACIC SURGERY (CARDIOTHORACIC VASCULAR SURGERY)

## 2020-03-11 PROCEDURE — 97530 THERAPEUTIC ACTIVITIES: CPT | Mod: GP

## 2020-03-11 PROCEDURE — 85730 THROMBOPLASTIN TIME PARTIAL: CPT | Performed by: THORACIC SURGERY (CARDIOTHORACIC VASCULAR SURGERY)

## 2020-03-11 PROCEDURE — 83735 ASSAY OF MAGNESIUM: CPT | Performed by: THORACIC SURGERY (CARDIOTHORACIC VASCULAR SURGERY)

## 2020-03-11 PROCEDURE — 71046 X-RAY EXAM CHEST 2 VIEWS: CPT

## 2020-03-11 PROCEDURE — 80048 BASIC METABOLIC PNL TOTAL CA: CPT | Performed by: THORACIC SURGERY (CARDIOTHORACIC VASCULAR SURGERY)

## 2020-03-11 PROCEDURE — 97116 GAIT TRAINING THERAPY: CPT | Mod: GP

## 2020-03-11 PROCEDURE — 97530 THERAPEUTIC ACTIVITIES: CPT | Mod: GO | Performed by: OCCUPATIONAL THERAPIST

## 2020-03-11 PROCEDURE — 00000146 ZZHCL STATISTIC GLUCOSE BY METER IP

## 2020-03-11 PROCEDURE — 25800030 ZZH RX IP 258 OP 636: Performed by: STUDENT IN AN ORGANIZED HEALTH CARE EDUCATION/TRAINING PROGRAM

## 2020-03-11 PROCEDURE — 21400000 ZZH R&B CCU UMMC

## 2020-03-11 PROCEDURE — 25000132 ZZH RX MED GY IP 250 OP 250 PS 637: Mod: GY | Performed by: STUDENT IN AN ORGANIZED HEALTH CARE EDUCATION/TRAINING PROGRAM

## 2020-03-11 RX ORDER — FUROSEMIDE 10 MG/ML
20 INJECTION INTRAMUSCULAR; INTRAVENOUS ONCE
Status: COMPLETED | OUTPATIENT
Start: 2020-03-11 | End: 2020-03-11

## 2020-03-11 RX ORDER — POTASSIUM CHLORIDE 750 MG/1
20 TABLET, EXTENDED RELEASE ORAL ONCE
Status: COMPLETED | OUTPATIENT
Start: 2020-03-11 | End: 2020-03-11

## 2020-03-11 RX ORDER — POTASSIUM CHLORIDE 750 MG/1
20 TABLET, EXTENDED RELEASE ORAL 2 TIMES DAILY
Status: DISCONTINUED | OUTPATIENT
Start: 2020-03-11 | End: 2020-03-11

## 2020-03-11 RX ORDER — FUROSEMIDE 10 MG/ML
40 INJECTION INTRAMUSCULAR; INTRAVENOUS ONCE
Status: DISCONTINUED | OUTPATIENT
Start: 2020-03-11 | End: 2020-03-11

## 2020-03-11 RX ORDER — FUROSEMIDE 10 MG/ML
20 INJECTION INTRAMUSCULAR; INTRAVENOUS
Status: DISCONTINUED | OUTPATIENT
Start: 2020-03-11 | End: 2020-03-11

## 2020-03-11 RX ORDER — LISINOPRIL 5 MG/1
5 TABLET ORAL DAILY
Status: DISCONTINUED | OUTPATIENT
Start: 2020-03-12 | End: 2020-03-13

## 2020-03-11 RX ADMIN — DIGOXIN 125 MCG: 125 TABLET ORAL at 09:37

## 2020-03-11 RX ADMIN — HYDRALAZINE HYDROCHLORIDE 100 MG: 100 TABLET, FILM COATED ORAL at 06:42

## 2020-03-11 RX ADMIN — METHOCARBAMOL 500 MG: 500 TABLET, FILM COATED ORAL at 20:29

## 2020-03-11 RX ADMIN — AMPICILLIN SODIUM 2 G: 2 INJECTION, POWDER, FOR SOLUTION INTRAMUSCULAR; INTRAVENOUS at 20:29

## 2020-03-11 RX ADMIN — POTASSIUM CHLORIDE 20 MEQ: 750 TABLET, EXTENDED RELEASE ORAL at 09:38

## 2020-03-11 RX ADMIN — HYDRALAZINE HYDROCHLORIDE 100 MG: 100 TABLET, FILM COATED ORAL at 21:00

## 2020-03-11 RX ADMIN — AMPICILLIN SODIUM 2 G: 2 INJECTION, POWDER, FOR SOLUTION INTRAMUSCULAR; INTRAVENOUS at 01:39

## 2020-03-11 RX ADMIN — LIDOCAINE 2 PATCH: 560 PATCH PERCUTANEOUS; TOPICAL; TRANSDERMAL at 20:30

## 2020-03-11 RX ADMIN — MULTIPLE VITAMINS W/ MINERALS TAB 1 TABLET: TAB at 09:38

## 2020-03-11 RX ADMIN — WARFARIN SODIUM 7 MG: 3 TABLET ORAL at 18:00

## 2020-03-11 RX ADMIN — INSULIN ASPART 2 UNITS: 100 INJECTION, SOLUTION INTRAVENOUS; SUBCUTANEOUS at 18:00

## 2020-03-11 RX ADMIN — HYDROMORPHONE HYDROCHLORIDE 2 MG: 2 TABLET ORAL at 15:53

## 2020-03-11 RX ADMIN — PANTOPRAZOLE SODIUM 40 MG: 40 TABLET, DELAYED RELEASE ORAL at 09:39

## 2020-03-11 RX ADMIN — FLUOXETINE 20 MG: 20 CAPSULE ORAL at 09:37

## 2020-03-11 RX ADMIN — ALLOPURINOL 200 MG: 100 TABLET ORAL at 09:38

## 2020-03-11 RX ADMIN — HYDRALAZINE HYDROCHLORIDE 100 MG: 100 TABLET, FILM COATED ORAL at 13:20

## 2020-03-11 RX ADMIN — ACETAMINOPHEN 650 MG: 325 TABLET, FILM COATED ORAL at 13:59

## 2020-03-11 RX ADMIN — AMPICILLIN SODIUM 2 G: 2 INJECTION, POWDER, FOR SOLUTION INTRAMUSCULAR; INTRAVENOUS at 09:36

## 2020-03-11 RX ADMIN — POTASSIUM CHLORIDE 20 MEQ: 750 TABLET, EXTENDED RELEASE ORAL at 13:36

## 2020-03-11 RX ADMIN — FUROSEMIDE 20 MG: 10 INJECTION INTRAMUSCULAR; INTRAVENOUS at 13:36

## 2020-03-11 RX ADMIN — HYDROMORPHONE HYDROCHLORIDE 2 MG: 2 TABLET ORAL at 22:47

## 2020-03-11 RX ADMIN — AMPICILLIN SODIUM 2 G: 2 INJECTION, POWDER, FOR SOLUTION INTRAMUSCULAR; INTRAVENOUS at 13:18

## 2020-03-11 RX ADMIN — AMIODARONE HYDROCHLORIDE 200 MG: 200 TABLET ORAL at 09:38

## 2020-03-11 RX ADMIN — ATORVASTATIN CALCIUM 20 MG: 20 TABLET, FILM COATED ORAL at 20:29

## 2020-03-11 RX ADMIN — HYDROMORPHONE HYDROCHLORIDE 2 MG: 2 TABLET ORAL at 09:36

## 2020-03-11 RX ADMIN — Medication 200 MG: at 09:37

## 2020-03-11 RX ADMIN — BIVALIRUDIN 0.02 MG/KG/HR: 250 INJECTION INTRACAVERNOUS at 18:12

## 2020-03-11 RX ADMIN — MAGNESIUM OXIDE 400 MG: 400 TABLET ORAL at 09:38

## 2020-03-11 RX ADMIN — MAGNESIUM SULFATE 2 G: 2 INJECTION INTRAVENOUS at 09:37

## 2020-03-11 ASSESSMENT — ACTIVITIES OF DAILY LIVING (ADL)
ADLS_ACUITY_SCORE: 16

## 2020-03-11 ASSESSMENT — PAIN DESCRIPTION - DESCRIPTORS
DESCRIPTORS: BURNING
DESCRIPTORS: BURNING

## 2020-03-11 ASSESSMENT — MIFFLIN-ST. JEOR: SCORE: 1703.63

## 2020-03-11 NOTE — PLAN OF CARE
D: Admitted 2/6 s/p HM3 LVAD 2/18 c/b bacteremia, hemothorax requiring chest tube placement on 3/5 and VATS/clot burden removal on 3/6. HIT positive. Hx of HF, NICM, CAD, HTN, ABHINAV, DM2, & CKDIII.     I/A: Pt A/Ox4. VSS on home CPAP. Aflutter. LVAD numbers WNL. Denies pain. Angiomax gtt infusing @ 0.02mg/kg/hr. 2 CTs to sxn w/ moderate serosanguinous output overnight. 2g Na diet. BG Q4H w/ sliding scale. Ax1 + walker.    P: Plan for ICD placement prior to discharge. Continue monitoring & notify CVTS of changes/concerns.

## 2020-03-11 NOTE — PLAN OF CARE
[id number]  D: Patient admitted 2/6/20 s/p LVAD HM3 on 2/18 c/b bacteremia, hemothorax requiring chest tube placement on 3/5 and VATS/clot burden removal on 3/6. HIT positive.    I/A: Monitored vitals and assessed patient status. A0x4. VSS. LVAD #'s WNL. A-flutter 80's-90's. Afebrile. Room air. C-pap at night. Lidocaine patches to right chest. Urinating adequately. LVAD teaching completed today. Ambulates with 1 assist, gait belt and walker. Working with therapies. 2:1 chest tube remains to -20 suction. Per order, can ambulate off suction for less than 30 min.    Completed: LVAD dressing changed by wife with LVAD coordinator during teaching  Running: Angiomax at 0.02 mg/kg/hr        TKO for antibiotics  PRN: Dilaudid    P: Continue to monitor patient status and report changes to treatment team. Per CVTS note, plan for ICD placement prior to discharge.

## 2020-03-11 NOTE — PROGRESS NOTES
"CVTS Daily Note  3/11/2020  Attending: Mac Jaramillo, *    S:   No overnight events.   Pt seen at bedside resting comfortably.    Does complain of leg edema with some pain while walking, otherwise no acute complaints.      Denies F/C/Sweats.  No CP, SOB, or calf pain.    Tolerating diet.  + BM.  + Flatus.    Ambulated well with assistance.  Getting stronger.   Pain level tolerable. Plan as per Neuro section below.     O:   Vital signs:  Temp: 97.8  F (36.6  C) Temp src: Oral BP: (!) 100/91   Heart Rate: 83 Resp: 16 SpO2: 97 % O2 Device: None (Room air) Oxygen Delivery: 2 LPM Height: (170 cm entered into optia) Weight: 96.5 kg (212 lb 11.9 oz)  Estimated body mass index is 33.32 kg/m  as calculated from the following:    Height as of this encounter: 1.702 m (5' 7\").    Weight as of this encounter: 96.5 kg (212 lb 11.9 oz).    Weight; Stable over past 7 days   24 hr Fluid status; net loss 2.2 L.     MAPs: 90 - 96  Gen: AAO x 3, pleasant, NAD  CV: VAD humming, irregular, no murmurs, rubs, or gallops.    Pulm: CTA, no rhonchi or wheezes  Abd: soft, non-tender, no guarding  Ext: moderate peripheral edema, 2 + pitting  Incision: clean, dry, intact, no erythema  Chest Tube sites: dressings clean and dry, serosanguinous, no air leak, output 320 cc   Lines: driveline dressing clean and dry   LVAD: speed 5500, flow 4.1 - 4.3, PI 3.5 - 4, power 4.2 - 4.6.       Labs:  BMP  Recent Labs   Lab 03/11/20  0543 03/10/20  0538 03/09/20  0443 03/09/20  0001 03/08/20  0527    136 134  --  135   POTASSIUM 4.0 3.9 3.7  --  3.8   CHLORIDE 101 99 98  --  100   CALOS 7.9* 8.0* 7.9*  --  7.8*   CO2 30 33* 33*  --  29   BUN 24 26 21 Canceled, Test credited 25   CR 0.95 0.98 0.92 Canceled, Test credited 1.11   * 117* 122*  --  156*     CBC  Recent Labs   Lab 03/11/20  0543 03/10/20  0538 03/09/20  0443 03/08/20  0527   WBC 10.5 9.6 10.5 12.2*   RBC 2.76* 2.68* 2.64* 2.59*   HGB 7.7* 7.6* 7.6* 7.4*   HCT 25.9* 25.1* 25.4* " 24.7*   MCV 94 94 96 95   MCH 27.9 28.4 28.8 28.6   MCHC 29.7* 30.3* 29.9* 30.0*   RDW 16.7* 16.4* 16.6* 16.4*   * 464* 463* 431     INR  Recent Labs   Lab 03/11/20  0543 03/10/20  0538 03/09/20  0443 03/08/20  0527   INR 2.29* 2.23* 2.29* 2.27*      Hepatic Panel   Lab Results   Component Value Date    AST 20 03/04/2020     Lab Results   Component Value Date    ALT 21 03/04/2020     Lab Results   Component Value Date    ALBUMIN 1.8 03/04/2020     GLUCOSE:   Recent Labs   Lab 03/11/20  0734 03/11/20  0640 03/11/20  0543 03/11/20  0138 03/10/20  2216 03/10/20  1802 03/10/20  1211  03/10/20  0538  03/09/20  0443  03/08/20  0527  03/07/20  0341  03/06/20  1615   GLC  --   --  132*  --   --   --   --   --  117*  --  122*  --  156*  --  133*  --  123*   * 126*  --  138* 173* 183* 170*   < >  --    < >  --    < >  --    < >  --    < >  --     < > = values in this interval not displayed.       Imaging:    CXR 3/11-   1. Stable support devices without appreciable pneumothorax. Unchanged small right pleural  effusion/residual hemothorax.  2. Increased conspicuity of loculated fluid in the posterior left hemithorax, similar to CT 3/4/2020.  3. No new airspace opacities.    ASSESSMENT:  Mr Eliseo Tanner is a 65yo M w/PMHx notable for chronic systolic heart failure, NICM, moderate CAD, HTN, ABHINAV on CPAP, DM2, CKDIII who is transferred to [facility] for evaluation and management of advanced heart failure with arrhythmia now s/p HM 3 on 2/18. 3/5 had hemothorax requiring a chest tube. Thoracic surgery performed washout of right hemothorax on 3/6.       PLAN:  Neuro:   - Neuro intact  - Pain; tylenol and oxycodone, lidocaine patches    - Depression; fluoxetine     CV:   - Hx of chronic systolic heart failure with EF 15-20%  - Atrial Fibrillation     - EP Consult, cardioversion 2/28.      - Amio 200 mg PO daily, digoxin 125 mcg  - HMIII LVAD     - Atorvastatin 20 mg. Holding ASA 81 mg due to recent bleeding.    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   Coumadin goal INR 2-3.     - INR 2.29, goal 2-3.  CGF10 42% (goal 20-40%)   - Bivalirudin gtt and warfarin currently, following chromogenic factor (39%)  - HTN; hydralazine 100 q 8 hrs, lisinopril 5 mg  - VT; EP recommended ICD placement prior to discharge. EP working on time  - Hemothorax      - Chest tube placed      - VATS 3/6, extensive clot burden removed   - Lymphedema wrap consult 3/11 for legs     Pulm:   - Hx ABHINAV on CPAP   - Pulm toilet, IS, activity and deep breathing   - Supplemental O2 PRN to keep sats > 92%. Wean off as tolerated.      ID:   - WBC WNL, afebrile, no signs or symptoms of infection   - Bacteremia 2/13 and 2/15; ampicillin 2 grams q 6 hrs (14 days after removal of all central venous catheters (2/26 - 3/11))   - ID recommends surveillance cultures every week x 4 weeks after stopping all antibiotics (note on 2/25/20). Repeat cultures for surveillance ordered 3/13.      GI / FEN:  - Reg diet, bowel regimen  - Hx GERD.      Renal / :   - CKD III; creatinine WNL, adequate UOP.   - Lasix 20 mg IV with K+ in afternoon x 1 dose     Heme:   - stable  - HIT pre-op with plasmapheresis. On Bivalirudin until CGF10 therapeutic.      Endo:   - Hx Gout; allopurinol 200 mg daily   - T2DM; sliding scale insulin and lantus 10 units q HS with good control.        Disposition:  - 6C again since 3/9  - TCU per PT team recs   - Needs ICD placed before discharge.       Staff surgeons have been informed of changes through both  verbal and written communication.      Diego Zuñiga PA-C  Cardiothoracic Surgery  Pager [phone]    9:48 AM   March 11, 2020

## 2020-03-11 NOTE — PLAN OF CARE
"PT - 6C  Discharge Planner PT   Patient plan for discharge: Rehab.  Current status: Pt transfers with Jose initially, though as session progresses pt needing Jose of 2 for transfers. Pt ambulates 150ft + 100ft 3x + 50ft using FWW and CGA with w/c follow as pt needing prolonged seated rest breaks between bouts of amb 2/2 fatigue and SOB. Pt reports LEs \"feeling shaky and wobbly\" after first two bouts of amb. Pt performs seated and standing LE strengthening exercises. SpO2>96% on RA with activity. LVAD #s WNL.   Barriers to return to prior living situation: Current medical status, current level of assist with transfers, LVAD management, deconditioning, decreased activity tolerance.  Recommendations for discharge: ARU.  Rationale for recommendations: Pt well below his baseline with functional mobility. Anticipating pt will be able to tolerate 3 hours of intensive interdisciplinary services each day as pt is motivated and has good support from wife.  Entered by: Faith Salgado 03/11/2020 12:10 PM       "

## 2020-03-11 NOTE — PROGRESS NOTES
Advanced Heart Failure Consult Progress Note  Eliseo Tanner MRN: [id number]  Age: 64 year old, : 1955  Date: 2020                    Assessment and Plan:     Mr Eliseo Tanner is a 65yo M w/PMHx notable for chronic systolic heart failure, NICM, moderate CAD, HTN, ABHINAV on CPAP, DM2, CKDIII who is transferred to [facility] for evaluation and management of advanced heart failure with arrhythmia now s/p HM 3 on .    Today's plan (3/11)  - Hold diuretics  - Will maintain bival because of chromogenic factor>40  - Start Lisinopril 5mg qday       Moderate CAD  Severe Mitral regurgitation  Chronic nonischemic systolic heart failure w/ reduced EF (15-20%) and exacerbation  New atrial fibrillation  NYHA Class III. Echo 2020: LVEF 15-20%; LVEDd 5.9 cm; 4+ MR. [facility] 2019 w/ nonobstructive CAD w/ severe branch disease.Presented with increased wt gain, SOB, LE edema concerning for HF exacerbation, initially concerning for cardiogenic shock. Admitted to [facility] for further evaluation regarding need for advanced HF therapies. Patient is now s/p HM III placement on  with early post-op course complicated by bleeding that is slowing down as of  AM. Patient is persistently tachycardic to 130-140 with a wide-complex mostly-regular tachycardia. ECG obtained difficult to interpret given LVAD artifact, but afib w/ RVR and aberrancy vs. senior care vs. AT vs. Slow VT. S/p DCCV on  back into sinus rhythm. With patient's history of bleeding, will hold ASA presently.   -Hold ASA 81  - Continue atorvastatin 20 mg  -Continue coumadin with goal INR 2-3, will maintain bivalirudin due to chromogenic factor>40  -Agree with continuing Hydral 100mg TID for afterload reduction   - Start lisinopril 5mg qday   -LVAD speed at 5500 RPM    #Retrosternal hematoma:  As seen on  CT chest at 5.1 cm. S/P chest tube per CVTS on 3/5. Chest tube drainage decreasing presently and HGB holding steady, low concern for  "bleeding presently. That being said, will discontinue ASA while patient is on bival+Coumadin     Proteus and Enterococcus bacteremia  Organisms growing from 2/2 blood cultures from 2/13. Blood cultured from 2/16 NGTD and 2/15 growing 1/2 S.epi, likely contaminant per ID.  ID consulted, appreciate help. Will decide on final duration of abx.  -daily blood cultures until negative  -Zosyn (2/13-2/14)  -Tobramycin (2/13-2/14)  -Vancomycin (2/13-2/17  -Meropenem (2/14-2/15)  -Ceftriaxone (2/16-2/28)  -Ampicillin (2/16-3/11) (14 days after swan removal on 2/26)    #Thrombocytopenia:  Concern for HIT given timing with respect to heparin exposure. HIT positive DAVINA negative. Antibody is now negative x2, thus no further indication for PLAX  -Heme consulted  -Continuing Bivalirudin gtt after LVAD surgery,goal chromogenic level 20-40 (given hemothorax)    VFib requiring shock  Shocked x 3 prior to transfer, loaded with amio. No known prior history. Suspect related to underlying HF.   -Keep K>4, Mg>2  -Amio 400 mg PO QD  -Needs ICD for secondary prevention, will need to discuss with EP     Atrial flutter   S/p cardioversion 2/28/20   - On Coumadin+Bivalirudin as above.     Vargas Harper MD  Cardiology Fellow  PGY5              Subjective/Interval Events     NAEON  Nursing notes reviewed     Eliseo states he feels pretty well this morning. He denies SOB at rest although does get quite fatigued with exercise. Sternal pain well controlled. No LH/dizziness. Undergoing LVAD teaching.            Objective     BP (!) 100/91 (BP Location: Left arm)   Pulse 72   Temp 97.8  F (36.6  C) (Oral)   Resp 16   Ht 1.702 m (5' 7\")   Wt 96.8 kg (213 lb 6.4 oz)   SpO2 97%   BMI 33.42 kg/m    Temp:  [97.6  F (36.4  C)-99  F (37.2  C)] 97.8  F (36.6  C)  Heart Rate:  [82-92] 83  Resp:  [16-18] 16  BP: ()/(53-94) 100/91  SpO2:  [95 %-99 %] 97 %  Wt Readings from Last 2 Encounters:   03/10/20 96.8 kg (213 lb 6.4 oz)     I/O last 3 completed " shifts:  In: 1212.8 [P.O.:960; I.V.:252.8]  Out: 3315 [Urine:2975; Chest Tube:340]      Gen: No acute distress  HEENT: NC/AT, JVP 6-7  PULM/THORAX: CTAB  CV: normal rate, regular rhythm, normal S1 and S2, LVAD hum  ABD: Soft, NTND, bowel sounds present, no masses  EXT: WWP. 1+ pitting LE edema, clubbing or cyanosis.  NEURO: A&Ox3            Data:     Recent Results (from the past 24 hour(s))   Glucose by meter    Collection Time: 03/10/20 12:11 PM   Result Value Ref Range    Glucose 170 (H) 70 - 99 mg/dL   Glucose by meter    Collection Time: 03/10/20  6:02 PM   Result Value Ref Range    Glucose 183 (H) 70 - 99 mg/dL   Glucose by meter    Collection Time: 03/10/20 10:16 PM   Result Value Ref Range    Glucose 173 (H) 70 - 99 mg/dL   Glucose by meter    Collection Time: 03/11/20  1:38 AM   Result Value Ref Range    Glucose 138 (H) 70 - 99 mg/dL   Basic metabolic panel    Collection Time: 03/11/20  5:43 AM   Result Value Ref Range    Sodium 136 133 - 144 mmol/L    Potassium 4.0 3.4 - 5.3 mmol/L    Chloride 101 94 - 109 mmol/L    Carbon Dioxide 30 20 - 32 mmol/L    Anion Gap 4 3 - 14 mmol/L    Glucose 132 (H) 70 - 99 mg/dL    Urea Nitrogen 24 7 - 30 mg/dL    Creatinine 0.95 0.66 - 1.25 mg/dL    GFR Estimate 83 >60 mL/min/[1.73_m2]    GFR Estimate If Black >90 >60 mL/min/[1.73_m2]    Calcium 7.9 (L) 8.5 - 10.1 mg/dL   CBC with platelets    Collection Time: 03/11/20  5:43 AM   Result Value Ref Range    WBC 10.5 4.0 - 11.0 10e9/L    RBC Count 2.76 (L) 4.4 - 5.9 10e12/L    Hemoglobin 7.7 (L) 13.3 - 17.7 g/dL    Hematocrit 25.9 (L) 40.0 - 53.0 %    MCV 94 78 - 100 fl    MCH 27.9 26.5 - 33.0 pg    MCHC 29.7 (L) 31.5 - 36.5 g/dL    RDW 16.7 (H) 10.0 - 15.0 %    Platelet Count 482 (H) 150 - 450 10e9/L   Factor 10 chromogenic    Collection Time: 03/11/20  5:43 AM   Result Value Ref Range    Chromogenic Factor 10 42 (L) 70 - 130 %   INR    Collection Time: 03/11/20  5:43 AM   Result Value Ref Range    INR 2.29 (H) 0.86 - 1.14    Magnesium    Collection Time: 20  5:43 AM   Result Value Ref Range    Magnesium 2.0 1.6 - 2.3 mg/dL   Partial thromboplastin time    Collection Time: 20  5:43 AM   Result Value Ref Range    PTT 78 (H) 22 - 37 sec   Glucose by meter    Collection Time: 20  6:40 AM   Result Value Ref Range    Glucose 126 (H) 70 - 99 mg/dL   Glucose by meter    Collection Time: 20  7:34 AM   Result Value Ref Range    Glucose 139 (H) 70 - 99 mg/dL       Recent Results (from the past 24 hour(s))   Echocardiogram Complete    Narrative    [id number]  [id number]  [id number]  [id number]^NAHUN^JIM           [facility]  Echocardiography Laboratory  [address] [place] [id number]     Name: WOLFGANG LEACH  MRN: [id number]  : 1955  Study Date: 2020 10:06 AM  Age: 64 yrs  Gender: Male  Patient Location: [facility]  Reason For Study: Heart Failure  Ordering Physician: NAHUN GUTIERREZ  Referring Physician: SELF, REFERRED  Performed By: Yvonne Adan RDCS     BSA: 2.2 m2  Height: 67 in  Weight: 244 lb  HR: 103  BP: 72/52 mmHg  _____________________________________________________________________________  __        Procedure  Complete Portable Echo Adult. Contrast Optison. Optison (NDC #6967-3876-41)  given intravenously. Patient was given 6 ml mixture of 3 ml Optison and 6 ml  saline. 3 ml wasted.  _____________________________________________________________________________  __        Interpretation Summary  Left ventricular size is normal.  LVEF 20% based on biplane 2D tracing.  Mild to moderate right ventricular dilation is present.  Global right ventricular function is moderately reduced.  Pulmonary artery systolic pressure is normal.  Dilation of the inferior vena cava is present with abnormal respiratory  variation in diameter.  _____________________________________________________________________________  __        Left Ventricle  Left ventricular size is normal.  LVEF 20% based on biplane 2D tracing. Left  ventricular wall thickness is normal. Diastolic function not assessed due to  frequent ectopy. Abnormal septal motion consistent with left bundle branch  block is present.     Right Ventricle  Mild to moderate right ventricular dilation is present. Global right  ventricular function is moderately reduced.     Atria  Mild biatrial enlargement is present.     Mitral Valve  Moderate mitral insufficiency is present.        Aortic Valve  Aortic valve is normal in structure and function.     Tricuspid Valve  Moderate tricuspid insufficiency is present. The right ventricular systolic  pressure is approximated at 24.4 mmHg plus the right atrial pressure.  Pulmonary artery systolic pressure is normal.     Pulmonic Valve  The pulmonic valve cannot be assessed. Mild pulmonic insufficiency is present.     Vessels  The aorta root is normal. The pulmonary artery cannot be assessed. Dilation of  the inferior vena cava is present with abnormal respiratory variation in  diameter.     Compared to Previous Study  Previous study not available for comparison.     _____________________________________________________________________________  __  MMode/2D Measurements & Calculations  IVSd: 0.61 cm  LVIDd: 4.7 cm  LVIDs: 3.7 cm  LVPWd: 0.75 cm  FS: 21.7 %  LV mass(C)d: 99.1 grams  LV mass(C)dI: 45.0 grams/m2     Ao root diam: 2.9 cm  asc Aorta Diam: 2.5 cm  LVOT diam: 1.9 cm  LVOT area: 2.8 cm2     EF(MOD-bp): 21.5 %  LA Volume (BP): 84.8 ml  LA Volume Index (BP): 38.5 ml/m2  RWT: 0.32        Doppler Measurements & Calculations  PA acc time: 0.09 sec     PI end-d saumya: 154.0 cm/sec  TR max saumya: 247.0 cm/sec  TR max P.4 mmHg     _____________________________________________________________________________  __           Report approved by: Graciela Tomlinson 2020 12:05 PM      XR Chest Port 1 View    Narrative    XR CHEST PORT 1 VW  2020 4:21 PM      HISTORY: SG Placement    COMPARISON:  None available    FINDINGS: Single AP chest radiograph. [place] catheter tip is in the  proximal right main pulmonary artery. Right central line tip and right  upper extremity PICC line tip at the lower SVC. Trachea is midline,  cardiomegaly. Bilateral perihilar streaky opacities. Mild increased  interstitial opacities in the right lung with ill-defined pulmonary  vascularity. No pneumothorax or pleural effusion. No acute osseous or  abdominal abnormality. Elevated left hemidiaphragm.      Impression    IMPRESSION:  1. [place] catheter tip at the proximal right main pulmonary artery.  2. Cardiomegaly with mild pulmonary edema, especially on the right.    I have personally reviewed the examination and initial interpretation  and I agree with the findings.    DONG SOLORIO MD   Cardiac Catheterization    Narrative      Successful insertion of a IABP in the RFA                Medications

## 2020-03-11 NOTE — PROGRESS NOTES
Both patient and wife successfully and correctly demonstrated the ability to:   -Switch power sources  -Change pocket controller. They both verbalized understanding that patient should only change controller after discussion with VAD Coordinator and in the presence of a trained caregiver whenever possible.   -Identify Pocket controller, Power module, and Battery charger function, alarms, and alarms management.   -Perform Self test on both controller (Should be done with pt connected to PM, not when on batteries) and power module.   -Care for driveline exit site (including immobilizing perc lead, identifying signs and symptoms of infection)   -State HMII precautions and warnings ( travel bags within arms reach at all time, overnight precautions,not to expose self to situations of total immersion in water including swimming, tub bath or boating; no MRIs, contact sports, vacuuming or activity that might create static electricity).   -Identify an emergency and state the correct action in the event of an emergency.   -Recognize situations that require calling VAD coordinator and how to contact coordinator.   -Determine procedures that necessitate prophylactic antibiotics.   Warfarin is managed by anticoagulation clinic; Pt is never to be stop or change coumadin dose unless directed to do so by either VAD team or [facility] anticoagulation clinic even if were told by another health care professional, both verbalized understanding   Pt and Chapis verbalized understanding of LVAD parameters and normal limits along with response plan for data outside range.    Hcapis demonstrated removing the old dressing, cleaning the exit site, and applying new dressing with 100 % accuracy. She demonstrated sterile technique with performing dressing change. Pt and Chapis identified s&sx of infection.  Pt and Chapis passed test, test reviewed with them.   All VAD education is complete.  Patient confirms having working  3-pronged grounded outlets at  home.  Critical life-sustaining medical equipment letter faxed to [employer].  Referral sent to [facility] medication monitoring clinic for warfaring management and dosing; Labs drawn at [facility]  Driveline exit site supplies will be ordered from the medical supply company that would provide the best coverage for patient.  Follow-up appointments scheduled on April 2nd with CVTS and on April 8th with Dr. Cisneros

## 2020-03-12 ENCOUNTER — APPOINTMENT (OUTPATIENT)
Dept: PHYSICAL THERAPY | Facility: CLINIC | Age: 65
DRG: 001 | End: 2020-03-12
Attending: INTERNAL MEDICINE
Payer: MEDICARE

## 2020-03-12 ENCOUNTER — APPOINTMENT (OUTPATIENT)
Dept: OCCUPATIONAL THERAPY | Facility: CLINIC | Age: 65
DRG: 001 | End: 2020-03-12
Attending: PHYSICIAN ASSISTANT
Payer: MEDICARE

## 2020-03-12 ENCOUNTER — APPOINTMENT (OUTPATIENT)
Dept: GENERAL RADIOLOGY | Facility: CLINIC | Age: 65
DRG: 001 | End: 2020-03-12
Attending: PHYSICIAN ASSISTANT
Payer: MEDICARE

## 2020-03-12 LAB
ANION GAP SERPL CALCULATED.3IONS-SCNC: 6 MMOL/L (ref 3–14)
APTT PPP: 85 SEC (ref 22–37)
BUN SERPL-MCNC: 19 MG/DL (ref 7–30)
CALCIUM SERPL-MCNC: 8 MG/DL (ref 8.5–10.1)
CHLORIDE SERPL-SCNC: 100 MMOL/L (ref 94–109)
CO2 SERPL-SCNC: 30 MMOL/L (ref 20–32)
CREAT SERPL-MCNC: 0.95 MG/DL (ref 0.66–1.25)
ERYTHROCYTE [DISTWIDTH] IN BLOOD BY AUTOMATED COUNT: 17 % (ref 10–15)
FACT X ACT/NOR PPP CHRO: 44 % (ref 70–130)
GFR SERPL CREATININE-BSD FRML MDRD: 83 ML/MIN/{1.73_M2}
GLUCOSE BLDC GLUCOMTR-MCNC: 100 MG/DL (ref 70–99)
GLUCOSE BLDC GLUCOMTR-MCNC: 106 MG/DL (ref 70–99)
GLUCOSE BLDC GLUCOMTR-MCNC: 110 MG/DL (ref 70–99)
GLUCOSE BLDC GLUCOMTR-MCNC: 147 MG/DL (ref 70–99)
GLUCOSE BLDC GLUCOMTR-MCNC: 151 MG/DL (ref 70–99)
GLUCOSE BLDC GLUCOMTR-MCNC: 162 MG/DL (ref 70–99)
GLUCOSE BLDC GLUCOMTR-MCNC: 165 MG/DL (ref 70–99)
GLUCOSE SERPL-MCNC: 108 MG/DL (ref 70–99)
HCT VFR BLD AUTO: 25.9 % (ref 40–53)
HGB BLD-MCNC: 7.6 G/DL (ref 13.3–17.7)
INR PPP: 2.33 (ref 0.86–1.14)
MAGNESIUM SERPL-MCNC: 2.1 MG/DL (ref 1.6–2.3)
MCH RBC QN AUTO: 27.1 PG (ref 26.5–33)
MCHC RBC AUTO-ENTMCNC: 29.3 G/DL (ref 31.5–36.5)
MCV RBC AUTO: 93 FL (ref 78–100)
PLATELET # BLD AUTO: 435 10E9/L (ref 150–450)
POTASSIUM SERPL-SCNC: 4 MMOL/L (ref 3.4–5.3)
RBC # BLD AUTO: 2.8 10E12/L (ref 4.4–5.9)
SODIUM SERPL-SCNC: 137 MMOL/L (ref 133–144)
WBC # BLD AUTO: 9.3 10E9/L (ref 4–11)

## 2020-03-12 PROCEDURE — 99233 SBSQ HOSP IP/OBS HIGH 50: CPT | Mod: GC | Performed by: INTERNAL MEDICINE

## 2020-03-12 PROCEDURE — 36415 COLL VENOUS BLD VENIPUNCTURE: CPT | Performed by: THORACIC SURGERY (CARDIOTHORACIC VASCULAR SURGERY)

## 2020-03-12 PROCEDURE — 97535 SELF CARE MNGMENT TRAINING: CPT | Mod: GO

## 2020-03-12 PROCEDURE — 25000128 H RX IP 250 OP 636: Performed by: PHYSICIAN ASSISTANT

## 2020-03-12 PROCEDURE — 82565 ASSAY OF CREATININE: CPT | Performed by: THORACIC SURGERY (CARDIOTHORACIC VASCULAR SURGERY)

## 2020-03-12 PROCEDURE — 25000132 ZZH RX MED GY IP 250 OP 250 PS 637: Mod: GY | Performed by: PHYSICIAN ASSISTANT

## 2020-03-12 PROCEDURE — 00000146 ZZHCL STATISTIC GLUCOSE BY METER IP

## 2020-03-12 PROCEDURE — 40000275 ZZH STATISTIC RCP TIME EA 10 MIN

## 2020-03-12 PROCEDURE — 25000132 ZZH RX MED GY IP 250 OP 250 PS 637: Mod: GY | Performed by: STUDENT IN AN ORGANIZED HEALTH CARE EDUCATION/TRAINING PROGRAM

## 2020-03-12 PROCEDURE — 83735 ASSAY OF MAGNESIUM: CPT | Performed by: THORACIC SURGERY (CARDIOTHORACIC VASCULAR SURGERY)

## 2020-03-12 PROCEDURE — 25000132 ZZH RX MED GY IP 250 OP 250 PS 637: Mod: GY | Performed by: THORACIC SURGERY (CARDIOTHORACIC VASCULAR SURGERY)

## 2020-03-12 PROCEDURE — 85260 CLOT FACTOR X STUART-POWER: CPT | Performed by: THORACIC SURGERY (CARDIOTHORACIC VASCULAR SURGERY)

## 2020-03-12 PROCEDURE — 25000128 H RX IP 250 OP 636: Performed by: STUDENT IN AN ORGANIZED HEALTH CARE EDUCATION/TRAINING PROGRAM

## 2020-03-12 PROCEDURE — 85610 PROTHROMBIN TIME: CPT | Performed by: THORACIC SURGERY (CARDIOTHORACIC VASCULAR SURGERY)

## 2020-03-12 PROCEDURE — 97530 THERAPEUTIC ACTIVITIES: CPT | Mod: GP

## 2020-03-12 PROCEDURE — 97116 GAIT TRAINING THERAPY: CPT | Mod: GP

## 2020-03-12 PROCEDURE — 21400000 ZZH R&B CCU UMMC

## 2020-03-12 PROCEDURE — 25800030 ZZH RX IP 258 OP 636: Performed by: STUDENT IN AN ORGANIZED HEALTH CARE EDUCATION/TRAINING PROGRAM

## 2020-03-12 PROCEDURE — 71045 X-RAY EXAM CHEST 1 VIEW: CPT

## 2020-03-12 PROCEDURE — 85027 COMPLETE CBC AUTOMATED: CPT | Performed by: THORACIC SURGERY (CARDIOTHORACIC VASCULAR SURGERY)

## 2020-03-12 PROCEDURE — 25000128 H RX IP 250 OP 636: Performed by: SURGERY

## 2020-03-12 PROCEDURE — 80048 BASIC METABOLIC PNL TOTAL CA: CPT | Performed by: THORACIC SURGERY (CARDIOTHORACIC VASCULAR SURGERY)

## 2020-03-12 PROCEDURE — 85730 THROMBOPLASTIN TIME PARTIAL: CPT | Performed by: THORACIC SURGERY (CARDIOTHORACIC VASCULAR SURGERY)

## 2020-03-12 RX ORDER — POTASSIUM CHLORIDE 750 MG/1
20 TABLET, EXTENDED RELEASE ORAL DAILY
Status: DISCONTINUED | OUTPATIENT
Start: 2020-03-12 | End: 2020-03-20 | Stop reason: HOSPADM

## 2020-03-12 RX ORDER — FUROSEMIDE 40 MG
40 TABLET ORAL DAILY
Status: DISCONTINUED | OUTPATIENT
Start: 2020-03-12 | End: 2020-03-20 | Stop reason: HOSPADM

## 2020-03-12 RX ADMIN — MAGNESIUM OXIDE 400 MG: 400 TABLET ORAL at 08:54

## 2020-03-12 RX ADMIN — ATORVASTATIN CALCIUM 20 MG: 20 TABLET, FILM COATED ORAL at 20:08

## 2020-03-12 RX ADMIN — FLUOXETINE 20 MG: 20 CAPSULE ORAL at 08:54

## 2020-03-12 RX ADMIN — METHOCARBAMOL 500 MG: 500 TABLET, FILM COATED ORAL at 12:20

## 2020-03-12 RX ADMIN — AMPICILLIN SODIUM 2 G: 2 INJECTION, POWDER, FOR SOLUTION INTRAMUSCULAR; INTRAVENOUS at 03:54

## 2020-03-12 RX ADMIN — INSULIN ASPART 2 UNITS: 100 INJECTION, SOLUTION INTRAVENOUS; SUBCUTANEOUS at 18:18

## 2020-03-12 RX ADMIN — ACETAMINOPHEN 650 MG: 325 TABLET, FILM COATED ORAL at 12:19

## 2020-03-12 RX ADMIN — POTASSIUM CHLORIDE 20 MEQ: 750 TABLET, EXTENDED RELEASE ORAL at 08:54

## 2020-03-12 RX ADMIN — AMPICILLIN SODIUM 2 G: 2 INJECTION, POWDER, FOR SOLUTION INTRAMUSCULAR; INTRAVENOUS at 13:40

## 2020-03-12 RX ADMIN — METHOCARBAMOL 500 MG: 500 TABLET, FILM COATED ORAL at 20:08

## 2020-03-12 RX ADMIN — HYDRALAZINE HYDROCHLORIDE 100 MG: 100 TABLET, FILM COATED ORAL at 06:53

## 2020-03-12 RX ADMIN — MULTIPLE VITAMINS W/ MINERALS TAB 1 TABLET: TAB at 08:54

## 2020-03-12 RX ADMIN — LISINOPRIL 5 MG: 5 TABLET ORAL at 08:54

## 2020-03-12 RX ADMIN — PANTOPRAZOLE SODIUM 40 MG: 40 TABLET, DELAYED RELEASE ORAL at 08:55

## 2020-03-12 RX ADMIN — AMIODARONE HYDROCHLORIDE 200 MG: 200 TABLET ORAL at 08:54

## 2020-03-12 RX ADMIN — HYDROMORPHONE HYDROCHLORIDE 2 MG: 2 TABLET ORAL at 18:19

## 2020-03-12 RX ADMIN — Medication 0.4 MG: at 13:53

## 2020-03-12 RX ADMIN — INSULIN ASPART 1 UNITS: 100 INJECTION, SOLUTION INTRAVENOUS; SUBCUTANEOUS at 13:41

## 2020-03-12 RX ADMIN — HYDRALAZINE HYDROCHLORIDE 100 MG: 100 TABLET, FILM COATED ORAL at 21:37

## 2020-03-12 RX ADMIN — DIGOXIN 125 MCG: 125 TABLET ORAL at 08:55

## 2020-03-12 RX ADMIN — POTASSIUM CHLORIDE 20 MEQ: 750 TABLET, EXTENDED RELEASE ORAL at 11:03

## 2020-03-12 RX ADMIN — FUROSEMIDE 40 MG: 40 TABLET ORAL at 11:03

## 2020-03-12 RX ADMIN — ALLOPURINOL 200 MG: 100 TABLET ORAL at 08:55

## 2020-03-12 RX ADMIN — WARFARIN SODIUM 9 MG: 5 TABLET ORAL at 18:18

## 2020-03-12 RX ADMIN — Medication 200 MG: at 08:54

## 2020-03-12 RX ADMIN — INSULIN ASPART 1 UNITS: 100 INJECTION, SOLUTION INTRAVENOUS; SUBCUTANEOUS at 21:38

## 2020-03-12 RX ADMIN — BIVALIRUDIN 0.02 MG/KG/HR: 250 INJECTION INTRACAVERNOUS at 18:18

## 2020-03-12 RX ADMIN — HYDRALAZINE HYDROCHLORIDE 100 MG: 100 TABLET, FILM COATED ORAL at 13:41

## 2020-03-12 RX ADMIN — HYDROMORPHONE HYDROCHLORIDE 2 MG: 2 TABLET ORAL at 11:03

## 2020-03-12 RX ADMIN — LIDOCAINE 2 PATCH: 560 PATCH PERCUTANEOUS; TOPICAL; TRANSDERMAL at 20:08

## 2020-03-12 RX ADMIN — AMPICILLIN SODIUM 2 G: 2 INJECTION, POWDER, FOR SOLUTION INTRAMUSCULAR; INTRAVENOUS at 08:55

## 2020-03-12 ASSESSMENT — PAIN DESCRIPTION - DESCRIPTORS: DESCRIPTORS: SHARP

## 2020-03-12 ASSESSMENT — ACTIVITIES OF DAILY LIVING (ADL)
ADLS_ACUITY_SCORE: 16

## 2020-03-12 NOTE — PLAN OF CARE
6C/Edema: Edema orders received; evaluation completed. Patient presenting with soft 1+ pitting in bilateral LE's, most noticeably on dorsum of foot, around ankles, and distal shin. Minimal non-pitting edema in proximal calves. Skin overall quite dry, especially on bottom of feet. Educated on compression and conservative management options. Skin cares completed; fit for Size F comperm from MTP's to below knee creases. Patient reporting comfort. OK to wear 23/24 hours. REMOVE if causing pain, numbness/tingling or become soiled. Edema to return 3/13 for follow-up.

## 2020-03-12 NOTE — PLAN OF CARE
PT - 6C  Discharge Planner PT   Patient plan for discharge: Rehab.  Current status: Pt initially Jose for sit<>stand but later needing Jose of 2 for sit<>stand from low bench 2/2 LE fatigue. Pt ambulates 250ft + 150ft+100ft using FWW and CGA with w/c follow as pt needing one seated rest break between last two bouts of amb 2/2 fatigue and mild SOB. Pt reports increased pain at chest tube site with increased distance. Pt performs standing toe taps and step ups on single step for LE strengthening. -125 and SpO2>95% with activity. LVAD #s WNL.  Barriers to return to prior living situation: Current medical status, current level of assist with transfers, LVAD management, deconditioning, decreased activity tolerance.  Recommendations for discharge: ARU.  Rationale for recommendations: Pt well below his baseline with functional mobility. Anticipating pt will be able to tolerate 3 hours of intensive interdisciplinary services each day as pt is motivated and has good support from wife.  Entered by: Faith Salgado 03/12/2020 10:44 AM

## 2020-03-12 NOTE — PLAN OF CARE
D: Pt admitted for LVAD work-up. Now s/p HM3 on 2/18. PMHx chronic systolic HF, NICM, HTN, ABHINAV, DM2, CKD, Afib (cardioversion on 2/28).     I/A: AVSS, on RA while awake and CPAP at night. Aflutter. Dilaudid and robaxin given for pain. AOx4. [place]. Low fat/low Na diet. BG checks q4h. +BS. Voiding via urinal. Slept between cares. Up w/1. Angiomax gtt infusing at 0.02mg/kg/hr. CTx2 to suction with serosanguineous output.     P: Plan is for ICD placement prior to discharge. Continue to monitor and follow POC. Notify team of any changes.

## 2020-03-12 NOTE — PROGRESS NOTES
"   03/12/20 1400   General Information   Discipline OT   Onset of Edema   (Acute on chronic. Reports increased over past several days.)   Affected Body Part(s) Left LE;Right LE   Edema Etiology Surgery  (LVAD, Heart Failure, Decreased Muscle Pump)   Etiology Comments Patient reports acute on chronic LE swelling. Increased over past several days with recent LVAD surgery. Has worn compression stockings in the past.    Pertinent history of current problem (PT: include personal factors and/or comorbidities that impact the POC; OT: include additional occupational profile info) Per chart: \"Mr Eliseo Tanner is a 65yo M w/PMHx notable for chronic systolic heart failure, NICM, moderate CAD, HTN, ABHINAV on CPAP, DM2, CKDIII who is transferred to [facility] for evaluation and management of advanced heart failure with arrhythmia now s/p HM 3 on 2/18. 3/5 had hemothorax requiring a chest tube. Thoracic surgery performed washout of right hemothorax on 3/6.\"   Special Tests for Edema Related Problem Ultrasound  (LE ultrasound negative February 2020)   Edema Precautions Cardiac Edema/CHF   Edema Precaution Comments LVAD/Chronic Heart Failure, A-Fib   Pain   Patient currently in pain Yes, see Vital Signs flowsheet   Edema Examination / Assessment   Skin Condition Pitting;Dryness   Skin Condition Comments Patient presenting with soft 1+ pitting, most noticeably on dorsum of foot, around ankles and distal shins. Minimal non-pitting edema in proximal shins. Bilateral LE's overall quite dry, especially around toes and bottom of feet. Large callous in bottom of L foot under big toe. Small intact scab on anterior L ankle. Small black dot on medial aspect of large toe. Toenails yellowed and hardened.   Scar Yes   Location Ankle and knee scar from previous surgery on LLE.    Mobility   (Working with OT/PT)   ROM   Range of Motion (WFL) other (describe)  (WFL)   Strength   Strength (WFL) other (describe)  (Grossly deconditioned post-op.) "   Assessment/Plan   Patient presents with Edema   Assessment Patient presenting with acute on chronic edema. See descriptions above. Causing some pain, though improving. Would benefit from edema services, compression, and conservative management options to promote ease of functional mobility, ADL participation and maintain skin integrity.    Assessment of Occupational Performance 1-3 Performance Deficits   Identified Performance Deficits Functional Mobility, LB dressing   Clinical Decision Making (Complexity) Low complexity   Planned Edema Interventions Gradient compression bandaging;Manual lymph drainage;Fit for compression garment;Exercises;Precautions to prevent infection/exacerbation;Education   Treatment Frequency 3x/week   Treatment Duration 1 week   Patient, Family and/or Staff in agreement with plan of care. Yes   Risks and benefits of treatment have been explained. Yes   Total Evaluation Time   Total Evaluation Time (Minutes) 8

## 2020-03-12 NOTE — PROGRESS NOTES
Advanced Heart Failure Consult Progress Note  Eliseo Tanner MRN: [id number]  Age: 64 year old, : 1955  Date: 2020                    Assessment and Plan:     Mr Eliseo Tanner is a 65yo M w/PMHx notable for chronic systolic heart failure, NICM, moderate CAD, HTN, ABHINAV on CPAP, DM2, CKDIII who is transferred to [facility] for evaluation and management of advanced heart failure with arrhythmia now s/p HM 3 on .    Today's plan (3/11)  - Hold diuretics  - Will maintain bival because of chromogenic factor>40     Moderate CAD  Severe Mitral regurgitation  Chronic nonischemic systolic heart failure w/ reduced EF (15-20%) and exacerbation  New atrial fibrillation  NYHA Class III. Echo 2020: LVEF 15-20%; LVEDd 5.9 cm; 4+ MR. [facility] 2019 w/ nonobstructive CAD w/ severe branch disease.Presented with increased wt gain, SOB, LE edema concerning for HF exacerbation, initially concerning for cardiogenic shock. Admitted to [facility] for further evaluation regarding need for advanced HF therapies. Patient is now s/p HM III placement on  with early post-op course complicated by bleeding that is slowing down as of  AM. Patient is persistently tachycardic to 130-140 with a wide-complex mostly-regular tachycardia. ECG obtained difficult to interpret given LVAD artifact, but afib w/ RVR and aberrancy vs. care home vs. AT vs. Slow VT. S/p DCCV on  back into sinus rhythm. With patient's history of bleeding, will hold ASA presently.   -Hold ASA 81  - Continue atorvastatin 20 mg  -Continue coumadin with goal INR 2-3, will maintain bivalirudin due to chromogenic factor>40  -Agree with continuing Hydral 100mg TID for afterload reduction   - Lisinopril 5mg qday (may need to go up tomorrow)  -LVAD speed at 5500 RPM    #Retrosternal hematoma:  As seen on  CT chest at 5.1 cm. S/P chest tube per CVTS on 3/5. Chest tube drainage decreasing presently and HGB holding steady, low concern for bleeding  "presently. That being said, will discontinue ASA while patient is on bival+Coumadin     Proteus and Enterococcus bacteremia  Organisms growing from 2/2 blood cultures from 2/13. Blood cultured from 2/16 NGTD and 2/15 growing 1/2 S.epi, likely contaminant per ID.  ID consulted, appreciate help. Will decide on final duration of abx.  -daily blood cultures until negative  -Zosyn (2/13-2/14)  -Tobramycin (2/13-2/14)  -Vancomycin (2/13-2/17  -Meropenem (2/14-2/15)  -Ceftriaxone (2/16-2/28)  -Ampicillin (2/16-3/11) (14 days after swan removal on 2/26)    #Thrombocytopenia:  Concern for HIT given timing with respect to heparin exposure. HIT positive DAVINA negative. Antibody is now negative x2, thus no further indication for PLAX  -Heme consulted  -Continuing Bivalirudin gtt after LVAD surgery,goal chromogenic level 20-40 (given hemothorax)    VFib requiring shock  Shocked x 3 prior to transfer, loaded with amio. No known prior history. Suspect related to underlying HF.   -Keep K>4, Mg>2  -Amio 400 mg PO QD  -Needs ICD for secondary prevention, will need to discuss with EP     Atrial flutter   S/p cardioversion 2/28/20   - On Coumadin+Bivalirudin as above.     Vargas Harper MD  Cardiology Fellow  PGY5              Subjective/Interval Events     NAEON  Nursing notes reviewed     Eliseo states he feels well this morning. Slept well. He denies SOB at rest. Sternal pain well controlled. No LH/dizziness. Undergoing LVAD teaching.            Objective     BP 90/80   Pulse 72   Temp 97.6  F (36.4  C) (Axillary)   Resp 16   Ht 1.702 m (5' 7\")   Wt 96.5 kg (212 lb 11.9 oz)   SpO2 95%   BMI 33.32 kg/m    Temp:  [97.6  F (36.4  C)-98.7  F (37.1  C)] 97.6  F (36.4  C)  Pulse:  [72] 72  Heart Rate:  [61-81] 61  Resp:  [16] 16  BP: ()/(64-86) 90/80  SpO2:  [95 %-97 %] 95 %  Wt Readings from Last 2 Encounters:   03/11/20 96.5 kg (212 lb 11.9 oz)     I/O last 3 completed shifts:  In: 1528.2 [P.O.:1260; I.V.:268.2]  Out: 2595 " [Urine:2255; Chest Tube:340]      Gen: No acute distress  HEENT: NC/AT, JVP 6-7  PULM/THORAX: CTAB  CV: normal rate, regular rhythm, normal S1 and S2, LVAD hum  ABD: Soft, NTND, bowel sounds present, no masses  EXT: WWP. 1+ pitting LE edema, clubbing or cyanosis.  NEURO: A&Ox3            Data:     Recent Results (from the past 24 hour(s))   Glucose by meter    Collection Time: 03/11/20 12:13 PM   Result Value Ref Range    Glucose 151 (H) 70 - 99 mg/dL   Glucose by meter    Collection Time: 03/11/20  5:37 PM   Result Value Ref Range    Glucose 175 (H) 70 - 99 mg/dL   Glucose by meter    Collection Time: 03/11/20  8:59 PM   Result Value Ref Range    Glucose 121 (H) 70 - 99 mg/dL   Glucose by meter    Collection Time: 03/12/20  3:59 AM   Result Value Ref Range    Glucose 106 (H) 70 - 99 mg/dL   Basic metabolic panel    Collection Time: 03/12/20  5:16 AM   Result Value Ref Range    Sodium 137 133 - 144 mmol/L    Potassium 4.0 3.4 - 5.3 mmol/L    Chloride 100 94 - 109 mmol/L    Carbon Dioxide 30 20 - 32 mmol/L    Anion Gap 6 3 - 14 mmol/L    Glucose 108 (H) 70 - 99 mg/dL    Urea Nitrogen 19 7 - 30 mg/dL    Creatinine 0.95 0.66 - 1.25 mg/dL    GFR Estimate 83 >60 mL/min/[1.73_m2]    GFR Estimate If Black >90 >60 mL/min/[1.73_m2]    Calcium 8.0 (L) 8.5 - 10.1 mg/dL   CBC with platelets    Collection Time: 03/12/20  5:16 AM   Result Value Ref Range    WBC 9.3 4.0 - 11.0 10e9/L    RBC Count 2.80 (L) 4.4 - 5.9 10e12/L    Hemoglobin 7.6 (L) 13.3 - 17.7 g/dL    Hematocrit 25.9 (L) 40.0 - 53.0 %    MCV 93 78 - 100 fl    MCH 27.1 26.5 - 33.0 pg    MCHC 29.3 (L) 31.5 - 36.5 g/dL    RDW 17.0 (H) 10.0 - 15.0 %    Platelet Count 435 150 - 450 10e9/L   INR    Collection Time: 03/12/20  5:16 AM   Result Value Ref Range    INR 2.33 (H) 0.86 - 1.14   Magnesium    Collection Time: 03/12/20  5:16 AM   Result Value Ref Range    Magnesium 2.1 1.6 - 2.3 mg/dL   Partial thromboplastin time    Collection Time: 03/12/20  5:16 AM   Result Value  Ref Range    PTT 85 (H) 22 - 37 sec   Glucose by meter    Collection Time: 20  6:54 AM   Result Value Ref Range    Glucose 100 (H) 70 - 99 mg/dL       Recent Results (from the past 24 hour(s))   Echocardiogram Complete    Narrative    [id number]  [id number]  [id number]  [id number]^NAHUN^JIM           [facility]  Echocardiography Laboratory  [address] [place] [address]     Name: WOLFGANG LEACH  MRN: [id number]  : 1955  Study Date: 2020 10:06 AM  Age: 64 yrs  Gender: Male  Patient [facility]  Reason For Study: Heart Failure  Ordering Physician: NAHUN GUTIERREZ  Referring Physician: GUANACO, REFERRED  Performed By: Yvonne Adan RDCS     BSA: 2.2 m2  Height: 67 in  Weight: 244 lb  HR: 103  BP: 72/52 mmHg  _____________________________________________________________________________  __        Procedure  Complete Portable Echo Adult. Contrast Optison. Optison (NDC #5787-5122-96)  given intravenously. Patient was given 6 ml mixture of 3 ml Optison and 6 ml  saline. 3 ml wasted.  _____________________________________________________________________________  __        Interpretation Summary  Left ventricular size is normal.  LVEF 20% based on biplane 2D tracing.  Mild to moderate right ventricular dilation is present.  Global right ventricular function is moderately reduced.  Pulmonary artery systolic pressure is normal.  Dilation of the inferior vena cava is present with abnormal respiratory  variation in diameter.  _____________________________________________________________________________  __        Left Ventricle  Left ventricular size is normal. LVEF 20% based on biplane 2D tracing. Left  ventricular wall thickness is normal. Diastolic function not assessed due to  frequent ectopy. Abnormal septal motion consistent with left bundle branch  block is present.     Right Ventricle  Mild to moderate right ventricular dilation is present. Global  right  ventricular function is moderately reduced.     Atria  Mild biatrial enlargement is present.     Mitral Valve  Moderate mitral insufficiency is present.        Aortic Valve  Aortic valve is normal in structure and function.     Tricuspid Valve  Moderate tricuspid insufficiency is present. The right ventricular systolic  pressure is approximated at 24.4 mmHg plus the right atrial pressure.  Pulmonary artery systolic pressure is normal.     Pulmonic Valve  The pulmonic valve cannot be assessed. Mild pulmonic insufficiency is present.     Vessels  The aorta root is normal. The pulmonary artery cannot be assessed. Dilation of  the inferior vena cava is present with abnormal respiratory variation in  diameter.     Compared to Previous Study  Previous study not available for comparison.     _____________________________________________________________________________  __  MMode/2D Measurements & Calculations  IVSd: 0.61 cm  LVIDd: 4.7 cm  LVIDs: 3.7 cm  LVPWd: 0.75 cm  FS: 21.7 %  LV mass(C)d: 99.1 grams  LV mass(C)dI: 45.0 grams/m2     Ao root diam: 2.9 cm  asc Aorta Diam: 2.5 cm  LVOT diam: 1.9 cm  LVOT area: 2.8 cm2     EF(MOD-bp): 21.5 %  LA Volume (BP): 84.8 ml  LA Volume Index (BP): 38.5 ml/m2  RWT: 0.32        Doppler Measurements & Calculations  PA acc time: 0.09 sec     PI end-d saumya: 154.0 cm/sec  TR max saumya: 247.0 cm/sec  TR max P.4 mmHg     _____________________________________________________________________________  __           Report approved by: Graciela Tomlinson 2020 12:05 PM      XR Chest Port 1 View    Narrative    XR CHEST PORT 1 VW  2020 4:21 PM      HISTORY: SG Placement    COMPARISON: None available    FINDINGS: Single AP chest radiograph. [place] catheter tip is in the  proximal right main pulmonary artery. Right central line tip and right  upper extremity PICC line tip at the lower SVC. Trachea is midline,  cardiomegaly. Bilateral perihilar streaky opacities. Mild  increased  interstitial opacities in the right lung with ill-defined pulmonary  vascularity. No pneumothorax or pleural effusion. No acute osseous or  abdominal abnormality. Elevated left hemidiaphragm.      Impression    IMPRESSION:  1. [place] catheter tip at the proximal right main pulmonary artery.  2. Cardiomegaly with mild pulmonary edema, especially on the right.    I have personally reviewed the examination and initial interpretation  and I agree with the findings.    DONG SOLORIO MD   Cardiac Catheterization    Narrative      Successful insertion of a IABP in the RFA                Medications

## 2020-03-12 NOTE — PLAN OF CARE
OT/CR 6C: Cancel. Pt with pain at CT site and nursing in the room upon first attempt. Pt with pain at driveline site upon second attempt, RN notified. Will continue with POC tomorrow.

## 2020-03-12 NOTE — PLAN OF CARE
D/A/I:  Patient A&O x4, denied palpitations, dizziness, and nausea.  Had MIRANDA, lung sounds diminished in bases, LVAD hum noted.  Had 2-3+ edema in legs and feet, compression stockings placed.  Received one-time dose of IV furosemide; patient had significant urine output, see I&O flowsheet.  Reported loose stools, bowel meds held.  Chest tubes draining serosanguinous fluid, no airleak noted.  Dressing changed.  See PCS for assessment and treatment of surgical incisions.  In atrial flutter with BBB, HR 80s-90s, SBP 100s, SaO2 96-97% on room air.  HeartMate 3 LVAD running at a fixed rate of 5500 rpm, no alarms during shift, hematocrit adjusted.  Flow 4.1-4.3, PI 3.6-4.0.  Drive line dressing changed by wife during LVAD education.  Bivalirudin gtt continued at 0.02 mg/kg/min (2.2 ml/hr).  Potassium and magnesium replaced per protocol.  Reported pain at the chest tube sites, was managed with prn acetaminophen and hydromorphone.  Chest X-ray performed during shift, see Chart Review for results.  Ambulated in room with 1-person assist and use of walker.  Wife visited during shift.    P:  Continue to monitor pain, VS, heart rhythm, LVAD function, drive line site integrity, skin and surgical site integrity, fluid status, bowel status, cardiac and respiratory status.  Notify care team of changes in patient condition or other concerns.  Expected to have ICD placed 3/13.

## 2020-03-12 NOTE — PROGRESS NOTES
"CVTS Daily Note  3/12/2020  Attending: Mac Jaramillo, *    S:   No overnight events.  Slept well.  Progressing with therapies.   Pt seen at bedside resting comfortably.  No acute complaints.      Denies F/C/Sweats.  No CP, SOB, or calf pain.    Tolerating diet.  + BM.  + Flatus.    Ambulated well with assistance.    Pain level tolerable. Plan as per Neuro section below.     O:   Vital signs:  Temp: 97.6  F (36.4  C) Temp src: Axillary BP: 90/80 Pulse: 72 Heart Rate: 61 Resp: 16 SpO2: 95 % O2 Device: BiPAP/CPAP Oxygen Delivery: 2 LPM Height: (170 cm entered into optia) Weight: 96.5 kg (212 lb 11.9 oz)  Estimated body mass index is 33.32 kg/m  as calculated from the following:    Height as of this encounter: 1.702 m (5' 7\").    Weight as of this encounter: 96.5 kg (212 lb 11.9 oz).    24 hr Fluid status; net loss 2.2 L.     MAPs: 81 - 84   Gen: AAO x 3, pleasant, NAD  Skin: bridge of nose erythema   CV: VAD humming, irregular, S1S2 normal, no murmurs, rubs, or gallops.   Pulm: CTA, no rhonchi or wheezes  Abd: soft, non-tender, no guarding  Ext: moderate peripheral edema  Incision: clean, dry, intact, no erythema  Chest Tube sites: dressings clean and dry, serosanguinous, no air leak, output 350 cc   Lines: driveline dressing clean and dry   LVAD: speed 5500, flow 4.1 - 4.2, PI 3.4 - 3.7, power 4.2 - 4.3.       Labs:  BMP  Recent Labs   Lab 03/12/20  0516 03/11/20  0543 03/10/20  0538 03/09/20  0443    136 136 134   POTASSIUM 4.0 4.0 3.9 3.7   CHLORIDE 100 101 99 98   CALOS 8.0* 7.9* 8.0* 7.9*   CO2 30 30 33* 33*   BUN 19 24 26 21   CR 0.95 0.95 0.98 0.92   * 132* 117* 122*     CBC  Recent Labs   Lab 03/12/20  0516 03/11/20  0543 03/10/20  0538 03/09/20  0443   WBC 9.3 10.5 9.6 10.5   RBC 2.80* 2.76* 2.68* 2.64*   HGB 7.6* 7.7* 7.6* 7.6*   HCT 25.9* 25.9* 25.1* 25.4*   MCV 93 94 94 96   MCH 27.1 27.9 28.4 28.8   MCHC 29.3* 29.7* 30.3* 29.9*   RDW 17.0* 16.7* 16.4* 16.6*    482* 464* 463* "     INR  Recent Labs   Lab 03/12/20  0516 03/11/20  0543 03/10/20  0538 03/09/20  0443   INR 2.33* 2.29* 2.23* 2.29*      Hepatic Panel   Lab Results   Component Value Date    AST 20 03/04/2020     Lab Results   Component Value Date    ALT 21 03/04/2020     Lab Results   Component Value Date    ALBUMIN 1.8 03/04/2020     GLUCOSE:   Recent Labs   Lab 03/12/20  0907 03/12/20  0654 03/12/20  0516 03/12/20  0359 03/11/20  2059 03/11/20  1737 03/11/20  1213  03/11/20  0543  03/10/20  0538  03/09/20  0443  03/08/20  0527  03/07/20  0341   GLC  --   --  108*  --   --   --   --   --  132*  --  117*  --  122*  --  156*  --  133*   * 100*  --  106* 121* 175* 151*   < >  --    < >  --    < >  --    < >  --    < >  --     < > = values in this interval not displayed.       Imaging:  reviewed recent imaging      ASSESSMENT:  Mr Eliseo Tanner is a 63yo M w/PMHx notable for chronic systolic heart failure, NICM, moderate CAD, HTN, ABHINAV on CPAP, DM2, CKDIII who is transferred to [facility] for evaluation and management of advanced heart failure with arrhythmia now s/p HM 3 on 2/18. 3/5 had hemothorax requiring a chest tube. Thoracic surgery performed washout of right hemothorax on 3/6.       PLAN:  Neuro:   - Neuro intact  - Pain; tylenol and oxycodone, lidocaine patches    - Depression; fluoxetine     CV:   - Hx of chronic systolic heart failure with EF 15-20%  - Atrial Fibrillation     - EP Consult, cardioversion 2/28.      - Amio 200 mg PO daily, digoxin 125 mcg  - HMIII LVAD     - Atorvastatin 20 mg. Holding ASA 81 mg due to recent bleeding.    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  Coumadin goal INR 2-3.     - INR 2.33, goal 2-3.  CGF10 44% (goal 20-40%)   - Bivalirudin gtt and warfarin currently, following chromogenic factor (39%)  - HTN; hydralazine 100 q 8 hrs, lisinopril 5 mg  - VT; EP recommended ICD placement prior to discharge. EP working on time  - Hemothorax      - Chest tube placed      - VATS 3/6, extensive clot burden removed   - Lymphedema  wrap consult 3/11 for legs     Pulm:   - Hx ABHINAV on CPAP   - Pulm toilet, IS, activity and deep breathing   - Supplemental O2 PRN to keep sats > 92%. Wean off as tolerated.      ID:   - WBC WNL, afebrile, no signs or symptoms of infection   - Bacteremia 2/13 and 2/15; ampicillin 2 grams q 6 hrs (14 days after removal of all central venous catheters (2/26 - 3/11))   - ID recommends surveillance cultures every week x 4 weeks after stopping all antibiotics (note on 2/25/20). Repeat cultures for surveillance ordered 3/13.      GI / FEN:  - Reg diet, bowel regimen  - Hx GERD.      Renal / :   - CKD III; creatinine WNL, adequate UOP.   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 On Bivalirudin until CGF10 therapeutic.      Endo:   - Hx Gout; allopurinol 200 mg daily   - T2DM; sliding scale insulin and lantus 10 units q HS with good control.        Disposition:  - 6C again since 3/9  - TCU per PT team recs   - Needs ICD placed before discharge. Needs CGF10 therapeutic first.       Staff surgeons have been informed of changes through both  verbal and written communication.      Diego Zuñiga PA-C  Cardiothoracic Surgery  Pager [phone]    9:29 AM   March 12, 2020

## 2020-03-13 ENCOUNTER — APPOINTMENT (OUTPATIENT)
Dept: PHYSICAL THERAPY | Facility: CLINIC | Age: 65
DRG: 001 | End: 2020-03-13
Attending: INTERNAL MEDICINE
Payer: MEDICARE

## 2020-03-13 ENCOUNTER — APPOINTMENT (OUTPATIENT)
Dept: GENERAL RADIOLOGY | Facility: CLINIC | Age: 65
DRG: 001 | End: 2020-03-13
Attending: PHYSICIAN ASSISTANT
Payer: MEDICARE

## 2020-03-13 ENCOUNTER — APPOINTMENT (OUTPATIENT)
Dept: OCCUPATIONAL THERAPY | Facility: CLINIC | Age: 65
DRG: 001 | End: 2020-03-13
Attending: INTERNAL MEDICINE
Payer: MEDICARE

## 2020-03-13 LAB
ANION GAP SERPL CALCULATED.3IONS-SCNC: 5 MMOL/L (ref 3–14)
APTT PPP: 88 SEC (ref 22–37)
BUN SERPL-MCNC: 21 MG/DL (ref 7–30)
CALCIUM SERPL-MCNC: 8.1 MG/DL (ref 8.5–10.1)
CHLORIDE SERPL-SCNC: 103 MMOL/L (ref 94–109)
CO2 SERPL-SCNC: 30 MMOL/L (ref 20–32)
CREAT SERPL-MCNC: 0.94 MG/DL (ref 0.66–1.25)
ERYTHROCYTE [DISTWIDTH] IN BLOOD BY AUTOMATED COUNT: 17 % (ref 10–15)
FACT X ACT/NOR PPP CHRO: 35 % (ref 70–130)
GFR SERPL CREATININE-BSD FRML MDRD: 84 ML/MIN/{1.73_M2}
GLUCOSE BLDC GLUCOMTR-MCNC: 119 MG/DL (ref 70–99)
GLUCOSE BLDC GLUCOMTR-MCNC: 119 MG/DL (ref 70–99)
GLUCOSE BLDC GLUCOMTR-MCNC: 121 MG/DL (ref 70–99)
GLUCOSE BLDC GLUCOMTR-MCNC: 138 MG/DL (ref 70–99)
GLUCOSE BLDC GLUCOMTR-MCNC: 141 MG/DL (ref 70–99)
GLUCOSE BLDC GLUCOMTR-MCNC: 153 MG/DL (ref 70–99)
GLUCOSE BLDC GLUCOMTR-MCNC: 162 MG/DL (ref 70–99)
GLUCOSE SERPL-MCNC: 120 MG/DL (ref 70–99)
HCT VFR BLD AUTO: 25.3 % (ref 40–53)
HGB BLD-MCNC: 7.3 G/DL (ref 13.3–17.7)
INR PPP: 2.76 (ref 0.86–1.14)
LMWH PPP CHRO-ACNC: <0.1 IU/ML
MAGNESIUM SERPL-MCNC: 1.9 MG/DL (ref 1.6–2.3)
MCH RBC QN AUTO: 27.1 PG (ref 26.5–33)
MCHC RBC AUTO-ENTMCNC: 28.9 G/DL (ref 31.5–36.5)
MCV RBC AUTO: 94 FL (ref 78–100)
PLATELET # BLD AUTO: 431 10E9/L (ref 150–450)
POTASSIUM SERPL-SCNC: 4.2 MMOL/L (ref 3.4–5.3)
RBC # BLD AUTO: 2.69 10E12/L (ref 4.4–5.9)
SODIUM SERPL-SCNC: 138 MMOL/L (ref 133–144)
WBC # BLD AUTO: 9 10E9/L (ref 4–11)

## 2020-03-13 PROCEDURE — 25000132 ZZH RX MED GY IP 250 OP 250 PS 637: Mod: GY | Performed by: STUDENT IN AN ORGANIZED HEALTH CARE EDUCATION/TRAINING PROGRAM

## 2020-03-13 PROCEDURE — 85730 THROMBOPLASTIN TIME PARTIAL: CPT | Performed by: THORACIC SURGERY (CARDIOTHORACIC VASCULAR SURGERY)

## 2020-03-13 PROCEDURE — 85520 HEPARIN ASSAY: CPT | Performed by: STUDENT IN AN ORGANIZED HEALTH CARE EDUCATION/TRAINING PROGRAM

## 2020-03-13 PROCEDURE — 25000132 ZZH RX MED GY IP 250 OP 250 PS 637: Mod: GY | Performed by: PHYSICIAN ASSISTANT

## 2020-03-13 PROCEDURE — 25000128 H RX IP 250 OP 636: Performed by: SURGERY

## 2020-03-13 PROCEDURE — 85260 CLOT FACTOR X STUART-POWER: CPT | Performed by: STUDENT IN AN ORGANIZED HEALTH CARE EDUCATION/TRAINING PROGRAM

## 2020-03-13 PROCEDURE — 80048 BASIC METABOLIC PNL TOTAL CA: CPT | Performed by: THORACIC SURGERY (CARDIOTHORACIC VASCULAR SURGERY)

## 2020-03-13 PROCEDURE — 99233 SBSQ HOSP IP/OBS HIGH 50: CPT | Mod: 25 | Performed by: INTERNAL MEDICINE

## 2020-03-13 PROCEDURE — 25000128 H RX IP 250 OP 636: Performed by: PHYSICIAN ASSISTANT

## 2020-03-13 PROCEDURE — 00000146 ZZHCL STATISTIC GLUCOSE BY METER IP

## 2020-03-13 PROCEDURE — 25000128 H RX IP 250 OP 636: Performed by: STUDENT IN AN ORGANIZED HEALTH CARE EDUCATION/TRAINING PROGRAM

## 2020-03-13 PROCEDURE — 36415 COLL VENOUS BLD VENIPUNCTURE: CPT | Performed by: THORACIC SURGERY (CARDIOTHORACIC VASCULAR SURGERY)

## 2020-03-13 PROCEDURE — 21400000 ZZH R&B CCU UMMC

## 2020-03-13 PROCEDURE — 87040 BLOOD CULTURE FOR BACTERIA: CPT | Performed by: PHYSICIAN ASSISTANT

## 2020-03-13 PROCEDURE — 97110 THERAPEUTIC EXERCISES: CPT | Mod: GP

## 2020-03-13 PROCEDURE — 71045 X-RAY EXAM CHEST 1 VIEW: CPT

## 2020-03-13 PROCEDURE — 93750 INTERROGATION VAD IN PERSON: CPT | Performed by: INTERNAL MEDICINE

## 2020-03-13 PROCEDURE — 85260 CLOT FACTOR X STUART-POWER: CPT | Performed by: THORACIC SURGERY (CARDIOTHORACIC VASCULAR SURGERY)

## 2020-03-13 PROCEDURE — 97530 THERAPEUTIC ACTIVITIES: CPT | Mod: GP

## 2020-03-13 PROCEDURE — 25000132 ZZH RX MED GY IP 250 OP 250 PS 637: Mod: GY | Performed by: THORACIC SURGERY (CARDIOTHORACIC VASCULAR SURGERY)

## 2020-03-13 PROCEDURE — 85027 COMPLETE CBC AUTOMATED: CPT | Performed by: THORACIC SURGERY (CARDIOTHORACIC VASCULAR SURGERY)

## 2020-03-13 PROCEDURE — 25800030 ZZH RX IP 258 OP 636: Performed by: STUDENT IN AN ORGANIZED HEALTH CARE EDUCATION/TRAINING PROGRAM

## 2020-03-13 PROCEDURE — 83735 ASSAY OF MAGNESIUM: CPT | Performed by: THORACIC SURGERY (CARDIOTHORACIC VASCULAR SURGERY)

## 2020-03-13 PROCEDURE — 97535 SELF CARE MNGMENT TRAINING: CPT | Mod: GO

## 2020-03-13 PROCEDURE — 85610 PROTHROMBIN TIME: CPT | Performed by: THORACIC SURGERY (CARDIOTHORACIC VASCULAR SURGERY)

## 2020-03-13 PROCEDURE — 36415 COLL VENOUS BLD VENIPUNCTURE: CPT | Performed by: STUDENT IN AN ORGANIZED HEALTH CARE EDUCATION/TRAINING PROGRAM

## 2020-03-13 RX ORDER — WARFARIN SODIUM 4 MG/1
8 TABLET ORAL
Status: COMPLETED | OUTPATIENT
Start: 2020-03-13 | End: 2020-03-13

## 2020-03-13 RX ORDER — LISINOPRIL 10 MG/1
10 TABLET ORAL DAILY
Status: DISCONTINUED | OUTPATIENT
Start: 2020-03-14 | End: 2020-03-20 | Stop reason: HOSPADM

## 2020-03-13 RX ORDER — FUROSEMIDE 10 MG/ML
40 INJECTION INTRAMUSCULAR; INTRAVENOUS ONCE
Status: COMPLETED | OUTPATIENT
Start: 2020-03-13 | End: 2020-03-13

## 2020-03-13 RX ORDER — LISINOPRIL 5 MG/1
5 TABLET ORAL ONCE
Status: COMPLETED | OUTPATIENT
Start: 2020-03-13 | End: 2020-03-13

## 2020-03-13 RX ADMIN — FUROSEMIDE 40 MG: 10 INJECTION INTRAMUSCULAR; INTRAVENOUS at 14:29

## 2020-03-13 RX ADMIN — MULTIPLE VITAMINS W/ MINERALS TAB 1 TABLET: TAB at 08:26

## 2020-03-13 RX ADMIN — MAGNESIUM SULFATE 2 G: 2 INJECTION INTRAVENOUS at 08:15

## 2020-03-13 RX ADMIN — BIVALIRUDIN 0.02 MG/KG/HR: 250 INJECTION INTRACAVERNOUS at 17:34

## 2020-03-13 RX ADMIN — POTASSIUM CHLORIDE 20 MEQ: 750 TABLET, EXTENDED RELEASE ORAL at 08:22

## 2020-03-13 RX ADMIN — LIDOCAINE 2 PATCH: 560 PATCH PERCUTANEOUS; TOPICAL; TRANSDERMAL at 19:38

## 2020-03-13 RX ADMIN — HYDROMORPHONE HYDROCHLORIDE 2 MG: 2 TABLET ORAL at 08:20

## 2020-03-13 RX ADMIN — WARFARIN SODIUM 8 MG: 4 TABLET ORAL at 17:33

## 2020-03-13 RX ADMIN — LISINOPRIL 5 MG: 5 TABLET ORAL at 08:22

## 2020-03-13 RX ADMIN — INSULIN ASPART 1 UNITS: 100 INJECTION, SOLUTION INTRAVENOUS; SUBCUTANEOUS at 17:32

## 2020-03-13 RX ADMIN — ALLOPURINOL 200 MG: 100 TABLET ORAL at 08:23

## 2020-03-13 RX ADMIN — LISINOPRIL 5 MG: 5 TABLET ORAL at 09:21

## 2020-03-13 RX ADMIN — DIGOXIN 125 MCG: 125 TABLET ORAL at 08:26

## 2020-03-13 RX ADMIN — AMIODARONE HYDROCHLORIDE 200 MG: 200 TABLET ORAL at 08:25

## 2020-03-13 RX ADMIN — HYDRALAZINE HYDROCHLORIDE 100 MG: 100 TABLET, FILM COATED ORAL at 21:43

## 2020-03-13 RX ADMIN — METHOCARBAMOL 500 MG: 500 TABLET, FILM COATED ORAL at 19:36

## 2020-03-13 RX ADMIN — Medication 200 MG: at 08:22

## 2020-03-13 RX ADMIN — MAGNESIUM OXIDE 400 MG: 400 TABLET ORAL at 08:24

## 2020-03-13 RX ADMIN — ACETAMINOPHEN 650 MG: 325 TABLET, FILM COATED ORAL at 16:37

## 2020-03-13 RX ADMIN — ATORVASTATIN CALCIUM 20 MG: 20 TABLET, FILM COATED ORAL at 19:35

## 2020-03-13 RX ADMIN — ACETAMINOPHEN 650 MG: 325 TABLET, FILM COATED ORAL at 12:01

## 2020-03-13 RX ADMIN — HYDRALAZINE HYDROCHLORIDE 100 MG: 100 TABLET, FILM COATED ORAL at 13:47

## 2020-03-13 RX ADMIN — FUROSEMIDE 40 MG: 40 TABLET ORAL at 08:25

## 2020-03-13 RX ADMIN — HYDRALAZINE HYDROCHLORIDE 100 MG: 100 TABLET, FILM COATED ORAL at 06:22

## 2020-03-13 RX ADMIN — Medication 0.4 MG: at 14:51

## 2020-03-13 RX ADMIN — METHOCARBAMOL 500 MG: 500 TABLET, FILM COATED ORAL at 10:37

## 2020-03-13 RX ADMIN — PANTOPRAZOLE SODIUM 40 MG: 40 TABLET, DELAYED RELEASE ORAL at 08:23

## 2020-03-13 RX ADMIN — FLUOXETINE 20 MG: 20 CAPSULE ORAL at 08:24

## 2020-03-13 ASSESSMENT — PAIN DESCRIPTION - DESCRIPTORS
DESCRIPTORS: ACHING;SORE

## 2020-03-13 ASSESSMENT — ACTIVITIES OF DAILY LIVING (ADL)
ADLS_ACUITY_SCORE: 16

## 2020-03-13 NOTE — PLAN OF CARE
"Discharge Planner PT   Patient plan for discharge: rehab  Current status: LVAD/vitals WNL throughout, fair tolerance to session, reports feeling \"jittery.\" Pt is Becky for bed mobility and for sit<>stands from bed, w/c and toilet height 2/2 precautions and decreased LE strength. Ambulation w/ FWW and ' 125', 225', reports 6/10 OMNI fatigue.   Barriers to return to prior living situation: medical, mobility, activity tolerance  Recommendations for discharge: ARU  Rationale for recommendations: Pt motivated, would be able to tolerate 3 hours of therapy per day and has interdisciplinary needs. Recommend inpatient rehab prior to discharge home.        Entered by: Mima Adan 03/13/2020 2:49 PM       " FYI, if that is difficult, she can go from 20mg to 40mg and skip 30mg  30mg step not needed unless side effect concerns      Thanks,  Pete Petersen

## 2020-03-13 NOTE — PLAN OF CARE
D/A/I:  Patient A&O x4, denied palpitations, dizziness, and nausea.  Had MIRANDA, lung sounds diminished in bases, LVAD hum noted.  Had 2-3+ edema in legs, compression stockings in place.  Received scheduled furosemide; patient had good urine output, see I&O flowsheet.  Chest tubes draining serosanguinous fluid, no air leak noted.  See PCS for assessment and treatment of surgical incisions.  In atrial flutter with BBB, HR 60s-90s, SBP 80s-90s, SaO2 95-96% on room air.  HeartMate 3 LVAD running at a fixed rate of 5500 rpm, no alarms during shift.  Flow 4.1-4.4, PI 3.3-3.7.  Drive line site erythematous with scant serosanguinous drainage.  Bivalirudin gtt continued at 0.02 mg/kg/min (2.2 ml/hr), PTT therapeutic.  Potassium replaced per protocol.  Reported incisional pain near the chest tubes that was managed with repositioning and prn analgesics, see MAR and VS flowsheet.  Ambulated in room with 1-person assist.  Wife visited during shift, was notified of restricted visitor policy starting tomorrow.    P:  Continue to monitor pain, VS, heart rhythm, LVAD function, drive line site integrity, skin integrity, fluid status, bowel status, cardiac and respiratory status.  Notify care team of changes in patient condition or other concerns.  Expected to discharge to TCU when medically appropriate.

## 2020-03-13 NOTE — PLAN OF CARE
6C: OT Cancel. Pt stated he's waiting for dressing change with LVAD and pt's wife was going to leave soon and wanted to spend time with her. Will reschedule.

## 2020-03-13 NOTE — PROGRESS NOTES
SPIRITUAL HEALTH SERVICES  SPIRITUAL ASSESSMENT Progress Note  [facility] 6C     Supportive visit with pt and spouse. Pt looking forward to discharge to TCU, but said before that happens he will getting an ICD. Supportive listening, encouragement, and prayer were shared.    PLAN: continue to follow, visiting at least weekly while pt on unit.     Ad Krause M.Div ([place])., [facility]  Staff   Pager [phone]

## 2020-03-13 NOTE — PLAN OF CARE
"BP 90/76 (BP Location: Left arm)   Pulse 72   Temp 98.3  F (36.8  C) (Axillary)   Resp 18   Ht 1.702 m (5' 7\")   Wt 96.5 kg (212 lb 11.9 oz)   SpO2 95%   BMI 33.32 kg/m      D: Pt admitted for LVAD work-up. Now s/p HM3 on 2/18. PMHx chronic systolic HF, NICM, HTN, ABHINAV, DM2, CKD, Afib (cardioversion on 2/28).   I/A:  AVSS, on RA while awake and CPAP at night. Aflutter.  LVAD #s WNL, dressing clean dry and intact.  Chest tube X2  to suction, and angiomax gtt infusing at 0.02mg/kg/hr.  Patient's BS was below 120s and no need for insulin coverage.  Patient slept between cares and voiding using beside urinal. Up with SBA for weight, and BS below 120s and no need coverage during the shift.   P: Will continue with POC and EP consulted for possible ICD placement prior to discharge.    "

## 2020-03-13 NOTE — PROGRESS NOTES
Advanced Heart Failure Consult Progress Note  Eliseo Tanner MRN: [id number]  Age: 64 year old, : 1955  Date: 2020                    Assessment and Plan:     Mr Eliseo Tanner is a 65yo M w/PMHx notable for chronic systolic heart failure, NICM, moderate CAD, HTN, ABHINAV on CPAP, DM2, CKDIII who is transferred to [facility] for evaluation and management of advanced heart failure with arrhythmia now s/p HM 3 on .    Today's plan (3/12)  - A little hypervolemic, agree with IV lasix  - Will maintain bival bridge until Chromogenic factor 10 20-40 on 2 reads     Moderate CAD  Severe Mitral regurgitation  Chronic nonischemic systolic heart failure w/ reduced EF (15-20%) and exacerbation  New atrial fibrillation  NYHA Class III. Echo 2020: LVEF 15-20%; LVEDd 5.9 cm; 4+ MR. LHC 2019 w/ nonobstructive CAD w/ severe branch disease.Presented with increased wt gain, SOB, LE edema concerning for HF exacerbation, initially concerning for cardiogenic shock. Admitted to [facility] for further evaluation regarding need for advanced HF therapies. Patient is now s/p HM III placement on  with early post-op course complicated by bleeding that is slowing down as of  AM. Patient is persistently tachycardic to 130-140 with a wide-complex mostly-regular tachycardia. ECG obtained difficult to interpret given LVAD artifact, but afib w/ RVR and aberrancy vs. long term vs. AT vs. Slow VT. S/p DCCV on  back into sinus rhythm. With patient's history of bleeding, will hold ASA presently.   -Hold ASA 81  - Continue atorvastatin 20 mg  -Continue coumadin with goal INR 2-3, will maintain bivalirudin due to chromogenic factor not at goal on 2 consecutive reads  -Agree with continuing Hydral 100mg TID for afterload reduction   - Increase lisinopril to 10mg  -LVAD speed at 5500 RPM    #Retrosternal hematoma:  As seen on  CT chest at 5.1 cm. S/P chest tube per CVTS on 3/5. Chest tube drainage decreasing  "presently and HGB holding steady, low concern for bleeding presently. That being said, will discontinue ASA while patient is on bival+Coumadin     Proteus and Enterococcus bacteremia  Organisms growing from 2/2 blood cultures from 2/13. Blood cultured from 2/16 NGTD and 2/15 growing 1/2 S.epi, likely contaminant per ID.  ID consulted, appreciate help. Will decide on final duration of abx.  -daily blood cultures until negative  -Zosyn (2/13-2/14)  -Tobramycin (2/13-2/14)  -Vancomycin (2/13-2/17  -Meropenem (2/14-2/15)  -Ceftriaxone (2/16-2/28)  -Ampicillin (2/16-3/11) (14 days after swan removal on 2/26)    #Thrombocytopenia:  Concern for HIT given timing with respect to heparin exposure. HIT positive DAVINA negative. Antibody is now negative x2, thus no further indication for PLAX  -Heme consulted  -Continuing Bivalirudin gtt after LVAD surgery,goal chromogenic level 20-40 (given hemothorax)    VFib requiring shock  Shocked x 3 prior to transfer, loaded with amio. No known prior history. Suspect related to underlying HF.   -Keep K>4, Mg>2  -Amio 400 mg PO QD  -Needs ICD for secondary prevention, will need to discuss with EP     Atrial flutter   S/p cardioversion 2/28/20   - On Coumadin+Bivalirudin as above.     Vargas Harper MD  Cardiology Fellow  PGY5              Subjective/Interval Events     NAEON  Nursing notes reviewed     Eliseo states he feels well this morning. Slept well. Working with PT his morning. He denies SOB at rest. Sternal pain well controlled. No LH/dizziness. Undergoing LVAD teaching.            Objective     /59 (BP Location: Left arm)   Pulse 82   Temp 98.7  F (37.1  C) (Oral)   Resp 18   Ht 1.702 m (5' 7\")   Wt 96.5 kg (212 lb 11.9 oz)   SpO2 95%   BMI 33.32 kg/m    Temp:  [97.8  F (36.6  C)-98.7  F (37.1  C)] 98.7  F (37.1  C)  Pulse:  [82] 82  Heart Rate:  [65-94] 94  Resp:  [16-18] 18  BP: ()/(59-81) 107/59  SpO2:  [95 %-96 %] 95 %  Wt Readings from Last 2 Encounters: "   03/11/20 96.5 kg (212 lb 11.9 oz)     I/O last 3 completed shifts:  In: 1032.8 [P.O.:980; I.V.:52.8]  Out: 1085 [Urine:825; Chest Tube:260]      Gen: No acute distress  HEENT: NC/AT, JVP 6-7  PULM/THORAX: CTAB  CV: normal rate, regular rhythm, normal S1 and S2, LVAD hum  ABD: Soft, NTND, bowel sounds present, no masses  EXT: WWP. 1+ pitting LE edema, clubbing or cyanosis.  NEURO: A&Ox3            Data:     Recent Results (from the past 24 hour(s))   Glucose by meter    Collection Time: 03/12/20  9:07 AM   Result Value Ref Range    Glucose 110 (H) 70 - 99 mg/dL   Glucose by meter    Collection Time: 03/12/20  1:10 PM   Result Value Ref Range    Glucose 162 (H) 70 - 99 mg/dL   Glucose by meter    Collection Time: 03/12/20  5:33 PM   Result Value Ref Range    Glucose 165 (H) 70 - 99 mg/dL   Glucose by meter    Collection Time: 03/12/20  9:35 PM   Result Value Ref Range    Glucose 147 (H) 70 - 99 mg/dL   Glucose by meter    Collection Time: 03/12/20 10:18 PM   Result Value Ref Range    Glucose 151 (H) 70 - 99 mg/dL   Glucose by meter    Collection Time: 03/13/20  3:11 AM   Result Value Ref Range    Glucose 119 (H) 70 - 99 mg/dL   Basic metabolic panel    Collection Time: 03/13/20  5:51 AM   Result Value Ref Range    Sodium 138 133 - 144 mmol/L    Potassium 4.2 3.4 - 5.3 mmol/L    Chloride 103 94 - 109 mmol/L    Carbon Dioxide 30 20 - 32 mmol/L    Anion Gap 5 3 - 14 mmol/L    Glucose 120 (H) 70 - 99 mg/dL    Urea Nitrogen 21 7 - 30 mg/dL    Creatinine 0.94 0.66 - 1.25 mg/dL    GFR Estimate 84 >60 mL/min/[1.73_m2]    GFR Estimate If Black >90 >60 mL/min/[1.73_m2]    Calcium 8.1 (L) 8.5 - 10.1 mg/dL   CBC with platelets    Collection Time: 03/13/20  5:51 AM   Result Value Ref Range    WBC 9.0 4.0 - 11.0 10e9/L    RBC Count 2.69 (L) 4.4 - 5.9 10e12/L    Hemoglobin 7.3 (L) 13.3 - 17.7 g/dL    Hematocrit 25.3 (L) 40.0 - 53.0 %    MCV 94 78 - 100 fl    MCH 27.1 26.5 - 33.0 pg    MCHC 28.9 (L) 31.5 - 36.5 g/dL    RDW 17.0 (H)  10.0 - 15.0 %    Platelet Count 431 150 - 450 10e9/L   Factor 10 chromogenic    Collection Time: 20  5:51 AM   Result Value Ref Range    Chromogenic Factor 10 35 (L) 70 - 130 %   INR    Collection Time: 20  5:51 AM   Result Value Ref Range    INR 2.76 (H) 0.86 - 1.14   Magnesium    Collection Time: 20  5:51 AM   Result Value Ref Range    Magnesium 1.9 1.6 - 2.3 mg/dL   Partial thromboplastin time    Collection Time: 20  5:51 AM   Result Value Ref Range    PTT 88 (H) 22 - 37 sec   Glucose by meter    Collection Time: 20  6:25 AM   Result Value Ref Range    Glucose 119 (H) 70 - 99 mg/dL   Glucose by meter    Collection Time: 20  8:11 AM   Result Value Ref Range    Glucose 121 (H) 70 - 99 mg/dL       Recent Results (from the past 24 hour(s))   Echocardiogram Complete    Narrative    [id number]  [id number]  [id number]  [id number]           [facility],[place]  Echocardiography Laboratory  [address] [place] [id number]     Name: WOLFGANG LEACH  MRN: [id number]  : 1955  Study Date: 2020 10:06 AM  Age: 64 yrs  Gender: Male  Patient Location: [facility]  Reason For Study: Heart Failure  Ordering Physician: NAHUN GUTIERREZ  Referring Physician: SELF, REFERRED  Performed By: Yvonne Adan RDCS     BSA: 2.2 m2  Height: 67 in  Weight: 244 lb  HR: 103  BP: 72/52 mmHg  _____________________________________________________________________________  __        Procedure  Complete Portable Echo Adult. Contrast Optison. Optison (NDC #3893-3951-23)  given intravenously. Patient was given 6 ml mixture of 3 ml Optison and 6 ml  saline. 3 ml wasted.  _____________________________________________________________________________  __        Interpretation Summary  Left ventricular size is normal.  LVEF 20% based on biplane 2D tracing.  Mild to moderate right ventricular dilation is present.  Global right ventricular function is moderately  reduced.  Pulmonary artery systolic pressure is normal.  Dilation of the inferior vena cava is present with abnormal respiratory  variation in diameter.  _____________________________________________________________________________  __        Left Ventricle  Left ventricular size is normal. LVEF 20% based on biplane 2D tracing. Left  ventricular wall thickness is normal. Diastolic function not assessed due to  frequent ectopy. Abnormal septal motion consistent with left bundle branch  block is present.     Right Ventricle  Mild to moderate right ventricular dilation is present. Global right  ventricular function is moderately reduced.     Atria  Mild biatrial enlargement is present.     Mitral Valve  Moderate mitral insufficiency is present.        Aortic Valve  Aortic valve is normal in structure and function.     Tricuspid Valve  Moderate tricuspid insufficiency is present. The right ventricular systolic  pressure is approximated at 24.4 mmHg plus the right atrial pressure.  Pulmonary artery systolic pressure is normal.     Pulmonic Valve  The pulmonic valve cannot be assessed. Mild pulmonic insufficiency is present.     Vessels  The aorta root is normal. The pulmonary artery cannot be assessed. Dilation of  the inferior vena cava is present with abnormal respiratory variation in  diameter.     Compared to Previous Study  Previous study not available for comparison.     _____________________________________________________________________________  __  MMode/2D Measurements & Calculations  IVSd: 0.61 cm  LVIDd: 4.7 cm  LVIDs: 3.7 cm  LVPWd: 0.75 cm  FS: 21.7 %  LV mass(C)d: 99.1 grams  LV mass(C)dI: 45.0 grams/m2     Ao root diam: 2.9 cm  asc Aorta Diam: 2.5 cm  LVOT diam: 1.9 cm  LVOT area: 2.8 cm2     EF(MOD-bp): 21.5 %  LA Volume (BP): 84.8 ml  LA Volume Index (BP): 38.5 ml/m2  RWT: 0.32        Doppler Measurements & Calculations  PA acc time: 0.09 sec     PI end-d saumya: 154.0 cm/sec  TR max saumya: 247.0 cm/sec  TR  max P.4 mmHg     _____________________________________________________________________________  __           Report approved by: Graciela Tomlinson 2020 12:05 PM      XR Chest Port 1 View    Narrative    XR CHEST PORT 1 VW  2020 4:21 PM      HISTORY: SG Placement    COMPARISON: None available    FINDINGS: Single AP chest radiograph. [place]-Chucky catheter tip is in the  proximal right main pulmonary artery. Right central line tip and right  upper extremity PICC line tip at the lower SVC. Trachea is midline,  cardiomegaly. Bilateral perihilar streaky opacities. Mild increased  interstitial opacities in the right lung with ill-defined pulmonary  vascularity. No pneumothorax or pleural effusion. No acute osseous or  abdominal abnormality. Elevated left hemidiaphragm.      Impression    IMPRESSION:  1. [place]-Chucky catheter tip at the proximal right main pulmonary artery.  2. Cardiomegaly with mild pulmonary edema, especially on the right.    I have personally reviewed the examination and initial interpretation  and I agree with the findings.    DONG SOLORIO MD   Cardiac Catheterization    Narrative      Successful insertion of a IABP in the RFA                Medications

## 2020-03-13 NOTE — PROGRESS NOTES
"CVTS Daily Note  3/13/2020  Surgeon: Clint    Subjective/interval:   Some SOB with activity. Ongoing moderate chest tube output, now very serous. Feels weak when up walking.     Objective:   Vital signs:  Temp: 98.7  F (37.1  C) Temp src: Oral BP: 107/59 Pulse: 82 Heart Rate: 94 Resp: 18 SpO2: 95 % O2 Device: None (Room air) Oxygen Delivery: 2 LPM Height: (170 cm entered into optia) Weight: 96.5 kg (212 lb 11.9 oz)  Estimated body mass index is 33.32 kg/m  as calculated from the following:    Height as of this encounter: 1.702 m (5' 7\").    Weight as of this encounter: 96.5 kg (212 lb 11.9 oz).      Intake/Output Summary (Last 24 hours) at 3/13/2020 0912  Last data filed at 3/13/2020 0800  Gross per 24 hour   Intake 1082.8 ml   Output 1105 ml   Net -22.2 ml         Gen: NAD, sitting up in chair  CV: LVAD hum. JVP elevated  Pulm: diminished bases  Abd: soft, non-tender, no guarding, +BS  Ext: 1+ bilateral lower extremity edema  Incision: clean, dry, intact, no erythema  Chest Tube sites: dressings clean and dry, serosanguinous, no air leak, output 390 ml  Neuro: grossly normal  Psych: calm, cooperative       Labs:  BMP  Recent Labs   Lab 03/13/20 0551 03/12/20 0516 03/11/20  0543 03/10/20  0538    137 136 136   POTASSIUM 4.2 4.0 4.0 3.9   CHLORIDE 103 100 101 99   CALOS 8.1* 8.0* 7.9* 8.0*   CO2 30 30 30 33*   BUN 21 19 24 26   CR 0.94 0.95 0.95 0.98   * 108* 132* 117*     CBC  Recent Labs   Lab 03/13/20 0551 03/12/20  0516 03/11/20  0543 03/10/20  0538   WBC 9.0 9.3 10.5 9.6   RBC 2.69* 2.80* 2.76* 2.68*   HGB 7.3* 7.6* 7.7* 7.6*   HCT 25.3* 25.9* 25.9* 25.1*   MCV 94 93 94 94   MCH 27.1 27.1 27.9 28.4   MCHC 28.9* 29.3* 29.7* 30.3*   RDW 17.0* 17.0* 16.7* 16.4*    435 482* 464*     INR  Recent Labs   Lab 03/13/20  0551 03/12/20  0516 03/11/20  0543 03/10/20  0538   INR 2.76* 2.33* 2.29* 2.23*      Hepatic Panel   Lab Results   Component Value Date    AST 20 03/04/2020     Lab Results "   Component Value Date    ALT 21 03/04/2020     Lab Results   Component Value Date    ALBUMIN 1.8 03/04/2020     GLUCOSE:   Recent Labs   Lab 03/13/20  0811 03/13/20  0625 03/13/20  0551 03/13/20  0311 03/12/20  2218 03/12/20  2135 03/12/20  1733  03/12/20  0516  03/11/20  0543  03/10/20  0538  03/09/20  0443  03/08/20  0527   GLC  --   --  120*  --   --   --   --   --  108*  --  132*  --  117*  --  122*  --  156*   * 119*  --  119* 151* 147* 165*   < >  --    < >  --    < >  --    < >  --    < >  --     < > = values in this interval not displayed.       Imaging:  reviewed recent imaging      Assessment/Plan:     Eliseo Tanner is a 64 year old male with a PMH of chronic systolic heart failure, NICM, moderate CAD, HTN, ABHINAV on CPAP, DM2, CKDIII who is transferred to [facility] for evaluation and management of advanced heart failure with arrhythmia now s/p HM 3 on 2/18 with Dr. Jaramillo. 3/4 had hemothorax requiring a chest tube and to OR for VATS washout of right hemothorax on 3/6.     Neuro:   # Postop Pain  - Neuro intact  - Tylenol and oxycodone   # Depression/anxiety  - Fluoxetine 20 mg daily    CV:   # Endstage NICM S/P LVAD HM3  # RV dysfunction  - TTE 02/25 @5600 RPM, septum midline, mod-severe RV dysfunction, aortic valve closed. Current speed 5500  - Digoxin 125 mcg daily for RV support  - MAPs 70s-80s. Lisinopril 10 mg daily, hydralazine 100 mg q8H. Avoid BB with RV dysfunction  - Hypervolemic. Lasix PO 40 mg daily, give additional IV 40 x1 today and reassess  - Aspirin on hold with recent hemothorax  - Warfarin to CFX goal 20-40. Bival bridge given ANA. CFX therapeutic, will discontinue bival if repeat CFX therapeutic  - Right pleral chest tube with serous drainage, no air leak. Keep today, diurese  # Atrial fibrillation  # VT  - EP planning ICD prior to discharge  - S/p DCCV 02/28  - Amiodarone 200 mg daily    Pulm:   # Postop mechanical ventilation, resolved  - Extubated POD 2 02/20  - Pulm  toilet, IS, activity and deep breathing   - Supplemental O2 PRN to keep sats > 92%. Wean off as tolerated.   # ABHINAV  - Home CPAP  # Right Hemothorax  - Chest tube placed 03/04  - VATS 3/6, extensive clot burden removed     ID:   # Proteus and enterococcus bacteremia  - Organisms growing from 2/2 blood cultures 2/13  - ID consulted, now following peripherally  ampicillin 2 grams q 6 hrs (14 days after removal of all central venous catheters (2/26 - 3/11))   - ID recommends surveillance cultures every week x 4 weeks after stopping all antibiotics (note on 2/25/20). Repeat cultures for surveillance ordered 3/13.    - WBC WNL, afebrile, no signs or symptoms of infection     GI / FEN:  # Nutrition  - Reg diet, bowel regimen    Renal / :   # CKD stage 3  - Creatinine WNL, trend   - Diuresis as above  - Avoid nephroxtoxins   # Gout  - Allopurinol 200 mg daily    Heme:  # Acute postop blood loss anemia  - Hgb 7.3, Plts 431  - Transfuse PRN  # ANA  - Completed plasmapheresis preop   - Bival/warfarin as above    Endo:   # DMT2  # Stress hyperglycemia  - Lantus 10 unit(s) at bedtime plus SSI    PPX:   - GI: Protonix  - DVT: bival/warfarin    Dispo:   - 6C since 3/9  - Anticipate DC to TCU per PT/OT pending chest tube removal and ICD placement      Discussed with CVTS Fellow   Staff surgeons have been informed of changes through both  verbal and written communication.      Laura Drake PA-C  Cardiothoracic Surgery  Pager [phone]

## 2020-03-13 NOTE — PLAN OF CARE
Edema/6C: Patient reporting comfort with comperm. Removed and skin assessed - remains intact with significant dryness throughout feet. Soft 1+ pitting near MTP's and around ankles. Minimal edema in shins. Skin washed and lotioned; comperm re-donned. Please assist patient with monitoring of proximal slippage at base of toes and distal slippage at knee caps; adjusting accordingly. REMOVE if causing pain, numbness/tingling or become soiled. Will check back 3/15.

## 2020-03-13 NOTE — PLAN OF CARE
D: Patient was admitted for LVAD work-up. Now s/p HM3 on 2/18. PMHx chronic systolic HF, NICM, HTN, ABHINAV, DM2, CKD, Afib (cardioversion on 2/28).     I: Monitored vitals and assessed patient status. He reived an extra dose of Lasix 40 mg iv  at 14:29 today. A Hep 10A level was just drawn stat.  Running: Bivalirudin (Angiomax) @ 0.02 mg/kg/hr  PRN:Dilaudid 2 mg orally every 4 hrs and had it at 08:30 andRobaxin 500 mg 3 times a day and had it at 10:37, He is getting 0.4 mg iv Dilaudid now befire his LVAd dressing and CT dressings are done.    A: Patient vital signs and LVAD values have been stable. Rhythm was Aflutter 70-90 with 0-3 PVC's/min. He complained of right side chest pain where his CT's are located.    I/O this shift:  In: 940 [P.O.:890; I.V.:50]  Out: 1370 [Urine:1250; Chest Tube:120]    Temp:  [97.8  F (36.6  C)-98.7  F (37.1  C)] 98.1  F (36.7  C)  Pulse:  [82-93] 93  Heart Rate:  [] 109  Resp:  [16-18] 16  BP: ()/(59-81) 109/62  SpO2:  [95 %-98 %] 98 %      P: Continue to monitor patient status and report changes to the CVTS treatment team.

## 2020-03-14 ENCOUNTER — APPOINTMENT (OUTPATIENT)
Dept: PHYSICAL THERAPY | Facility: CLINIC | Age: 65
DRG: 001 | End: 2020-03-14
Attending: INTERNAL MEDICINE
Payer: MEDICARE

## 2020-03-14 ENCOUNTER — APPOINTMENT (OUTPATIENT)
Dept: OCCUPATIONAL THERAPY | Facility: CLINIC | Age: 65
DRG: 001 | End: 2020-03-14
Attending: INTERNAL MEDICINE
Payer: MEDICARE

## 2020-03-14 ENCOUNTER — APPOINTMENT (OUTPATIENT)
Dept: GENERAL RADIOLOGY | Facility: CLINIC | Age: 65
DRG: 001 | End: 2020-03-14
Attending: PHYSICIAN ASSISTANT
Payer: MEDICARE

## 2020-03-14 LAB
ANION GAP SERPL CALCULATED.3IONS-SCNC: 3 MMOL/L (ref 3–14)
APTT PPP: 102 SEC (ref 22–37)
BUN SERPL-MCNC: 22 MG/DL (ref 7–30)
CALCIUM SERPL-MCNC: 7.9 MG/DL (ref 8.5–10.1)
CHLORIDE SERPL-SCNC: 100 MMOL/L (ref 94–109)
CO2 SERPL-SCNC: 31 MMOL/L (ref 20–32)
CREAT SERPL-MCNC: 1.08 MG/DL (ref 0.66–1.25)
ERYTHROCYTE [DISTWIDTH] IN BLOOD BY AUTOMATED COUNT: 17.2 % (ref 10–15)
FACT X ACT/NOR PPP CHRO: 32 % (ref 70–130)
FACT X ACT/NOR PPP CHRO: 38 % (ref 70–130)
GFR SERPL CREATININE-BSD FRML MDRD: 71 ML/MIN/{1.73_M2}
GLUCOSE BLDC GLUCOMTR-MCNC: 112 MG/DL (ref 70–99)
GLUCOSE BLDC GLUCOMTR-MCNC: 117 MG/DL (ref 70–99)
GLUCOSE BLDC GLUCOMTR-MCNC: 127 MG/DL (ref 70–99)
GLUCOSE BLDC GLUCOMTR-MCNC: 134 MG/DL (ref 70–99)
GLUCOSE BLDC GLUCOMTR-MCNC: 152 MG/DL (ref 70–99)
GLUCOSE SERPL-MCNC: 115 MG/DL (ref 70–99)
HCT VFR BLD AUTO: 26.2 % (ref 40–53)
HGB BLD-MCNC: 7.6 G/DL (ref 13.3–17.7)
MAGNESIUM SERPL-MCNC: 2.1 MG/DL (ref 1.6–2.3)
MCH RBC QN AUTO: 27 PG (ref 26.5–33)
MCHC RBC AUTO-ENTMCNC: 29 G/DL (ref 31.5–36.5)
MCV RBC AUTO: 93 FL (ref 78–100)
PLATELET # BLD AUTO: 427 10E9/L (ref 150–450)
POTASSIUM SERPL-SCNC: 3.9 MMOL/L (ref 3.4–5.3)
RBC # BLD AUTO: 2.82 10E12/L (ref 4.4–5.9)
SODIUM SERPL-SCNC: 134 MMOL/L (ref 133–144)
WBC # BLD AUTO: 8.9 10E9/L (ref 4–11)

## 2020-03-14 PROCEDURE — 25000132 ZZH RX MED GY IP 250 OP 250 PS 637: Mod: GY | Performed by: PHYSICIAN ASSISTANT

## 2020-03-14 PROCEDURE — 85027 COMPLETE CBC AUTOMATED: CPT | Performed by: THORACIC SURGERY (CARDIOTHORACIC VASCULAR SURGERY)

## 2020-03-14 PROCEDURE — 97535 SELF CARE MNGMENT TRAINING: CPT | Mod: GO

## 2020-03-14 PROCEDURE — 71045 X-RAY EXAM CHEST 1 VIEW: CPT

## 2020-03-14 PROCEDURE — 97530 THERAPEUTIC ACTIVITIES: CPT | Mod: GP

## 2020-03-14 PROCEDURE — 25000128 H RX IP 250 OP 636: Performed by: STUDENT IN AN ORGANIZED HEALTH CARE EDUCATION/TRAINING PROGRAM

## 2020-03-14 PROCEDURE — 85730 THROMBOPLASTIN TIME PARTIAL: CPT | Performed by: THORACIC SURGERY (CARDIOTHORACIC VASCULAR SURGERY)

## 2020-03-14 PROCEDURE — 00000146 ZZHCL STATISTIC GLUCOSE BY METER IP

## 2020-03-14 PROCEDURE — 40000275 ZZH STATISTIC RCP TIME EA 10 MIN

## 2020-03-14 PROCEDURE — 97530 THERAPEUTIC ACTIVITIES: CPT | Mod: GO

## 2020-03-14 PROCEDURE — 97116 GAIT TRAINING THERAPY: CPT | Mod: GP

## 2020-03-14 PROCEDURE — 85260 CLOT FACTOR X STUART-POWER: CPT | Performed by: THORACIC SURGERY (CARDIOTHORACIC VASCULAR SURGERY)

## 2020-03-14 PROCEDURE — 25800030 ZZH RX IP 258 OP 636: Performed by: STUDENT IN AN ORGANIZED HEALTH CARE EDUCATION/TRAINING PROGRAM

## 2020-03-14 PROCEDURE — 83735 ASSAY OF MAGNESIUM: CPT | Performed by: THORACIC SURGERY (CARDIOTHORACIC VASCULAR SURGERY)

## 2020-03-14 PROCEDURE — 99233 SBSQ HOSP IP/OBS HIGH 50: CPT | Mod: 25 | Performed by: NURSE PRACTITIONER

## 2020-03-14 PROCEDURE — 97110 THERAPEUTIC EXERCISES: CPT | Mod: GP

## 2020-03-14 PROCEDURE — 25000132 ZZH RX MED GY IP 250 OP 250 PS 637: Mod: GY | Performed by: STUDENT IN AN ORGANIZED HEALTH CARE EDUCATION/TRAINING PROGRAM

## 2020-03-14 PROCEDURE — 25000132 ZZH RX MED GY IP 250 OP 250 PS 637: Mod: GY | Performed by: THORACIC SURGERY (CARDIOTHORACIC VASCULAR SURGERY)

## 2020-03-14 PROCEDURE — 21400000 ZZH R&B CCU UMMC

## 2020-03-14 PROCEDURE — 93750 INTERROGATION VAD IN PERSON: CPT | Performed by: NURSE PRACTITIONER

## 2020-03-14 PROCEDURE — 36415 COLL VENOUS BLD VENIPUNCTURE: CPT | Performed by: THORACIC SURGERY (CARDIOTHORACIC VASCULAR SURGERY)

## 2020-03-14 PROCEDURE — 80048 BASIC METABOLIC PNL TOTAL CA: CPT | Performed by: THORACIC SURGERY (CARDIOTHORACIC VASCULAR SURGERY)

## 2020-03-14 RX ADMIN — HYDROMORPHONE HYDROCHLORIDE 2 MG: 2 TABLET ORAL at 14:40

## 2020-03-14 RX ADMIN — DIGOXIN 125 MCG: 125 TABLET ORAL at 08:15

## 2020-03-14 RX ADMIN — MAGNESIUM OXIDE 400 MG: 400 TABLET ORAL at 08:12

## 2020-03-14 RX ADMIN — LISINOPRIL 10 MG: 10 TABLET ORAL at 08:13

## 2020-03-14 RX ADMIN — LIDOCAINE 2 PATCH: 560 PATCH PERCUTANEOUS; TOPICAL; TRANSDERMAL at 20:13

## 2020-03-14 RX ADMIN — HYDROMORPHONE HYDROCHLORIDE 2 MG: 2 TABLET ORAL at 08:21

## 2020-03-14 RX ADMIN — HYDRALAZINE HYDROCHLORIDE 100 MG: 100 TABLET, FILM COATED ORAL at 07:04

## 2020-03-14 RX ADMIN — AMIODARONE HYDROCHLORIDE 200 MG: 200 TABLET ORAL at 08:14

## 2020-03-14 RX ADMIN — Medication 200 MG: at 08:11

## 2020-03-14 RX ADMIN — ALLOPURINOL 200 MG: 100 TABLET ORAL at 08:13

## 2020-03-14 RX ADMIN — ATORVASTATIN CALCIUM 20 MG: 20 TABLET, FILM COATED ORAL at 20:13

## 2020-03-14 RX ADMIN — POTASSIUM CHLORIDE 20 MEQ: 750 TABLET, EXTENDED RELEASE ORAL at 08:21

## 2020-03-14 RX ADMIN — METHOCARBAMOL 500 MG: 500 TABLET, FILM COATED ORAL at 11:56

## 2020-03-14 RX ADMIN — WARFARIN SODIUM 7 MG: 3 TABLET ORAL at 17:45

## 2020-03-14 RX ADMIN — POTASSIUM CHLORIDE 20 MEQ: 750 TABLET, EXTENDED RELEASE ORAL at 08:12

## 2020-03-14 RX ADMIN — BIVALIRUDIN 0.01 MG/KG/HR: 250 INJECTION INTRACAVERNOUS at 07:10

## 2020-03-14 RX ADMIN — FUROSEMIDE 40 MG: 40 TABLET ORAL at 08:13

## 2020-03-14 RX ADMIN — FLUOXETINE 20 MG: 20 CAPSULE ORAL at 08:13

## 2020-03-14 RX ADMIN — HYDRALAZINE HYDROCHLORIDE 100 MG: 100 TABLET, FILM COATED ORAL at 22:12

## 2020-03-14 RX ADMIN — INSULIN ASPART 1 UNITS: 100 INJECTION, SOLUTION INTRAVENOUS; SUBCUTANEOUS at 17:44

## 2020-03-14 RX ADMIN — HYDRALAZINE HYDROCHLORIDE 100 MG: 100 TABLET, FILM COATED ORAL at 13:54

## 2020-03-14 RX ADMIN — PANTOPRAZOLE SODIUM 40 MG: 40 TABLET, DELAYED RELEASE ORAL at 07:04

## 2020-03-14 RX ADMIN — METHOCARBAMOL 500 MG: 500 TABLET, FILM COATED ORAL at 20:12

## 2020-03-14 RX ADMIN — ACETAMINOPHEN 650 MG: 325 TABLET, FILM COATED ORAL at 11:56

## 2020-03-14 RX ADMIN — MULTIPLE VITAMINS W/ MINERALS TAB 1 TABLET: TAB at 08:13

## 2020-03-14 ASSESSMENT — ACTIVITIES OF DAILY LIVING (ADL)
ADLS_ACUITY_SCORE: 16

## 2020-03-14 ASSESSMENT — PAIN DESCRIPTION - DESCRIPTORS
DESCRIPTORS: ACHING;SHARP;SORE
DESCRIPTORS: ACHING;SORE

## 2020-03-14 NOTE — PROGRESS NOTES
"CVTS Daily Note  3/14/2020  Surgeon: Clint    Subjective/interval:   Breathing improved. Diuresed well. Working with therapies.     Objective:   Vital signs:  Temp: 98.4  F (36.9  C) Temp src: Oral BP: (!) 100/90 Pulse: 73 Heart Rate: 84 Resp: 16 SpO2: 95 % O2 Device: None (Room air) Oxygen Delivery: 2 LPM Height: (170 cm entered into optia) Weight: 96.5 kg (212 lb 11.9 oz)  Estimated body mass index is 33.32 kg/m  as calculated from the following:    Height as of this encounter: 1.702 m (5' 7\").    Weight as of this encounter: 96.5 kg (212 lb 11.9 oz).      Intake/Output Summary (Last 24 hours) at 3/13/2020 0912  Last data filed at 3/13/2020 0800  Gross per 24 hour   Intake 1082.8 ml   Output 1105 ml   Net -22.2 ml         Gen: NAD, sitting up in chair  CV: LVAD hum. JVP elevated  Pulm: diminished bases  Abd: soft, non-tender, no guarding, +BS  Ext: 1+ bilateral lower extremity edema  Incision: clean, dry, intact, no erythema  Chest Tube sites: dressings clean and dry, serosanguinous, no air leak, output 200 ml  Neuro: grossly normal  Psych: calm, cooperative       Labs:  BMP  Recent Labs   Lab 03/14/20 0524 03/13/20  0551 03/12/20  0516 03/11/20  0543    138 137 136   POTASSIUM 3.9 4.2 4.0 4.0   CHLORIDE 100 103 100 101   CALOS 7.9* 8.1* 8.0* 7.9*   CO2 31 30 30 30   BUN 22 21 19 24   CR 1.08 0.94 0.95 0.95   * 120* 108* 132*     CBC  Recent Labs   Lab 03/14/20 0524 03/13/20  0551 03/12/20  0516 03/11/20  0543   WBC 8.9 9.0 9.3 10.5   RBC 2.82* 2.69* 2.80* 2.76*   HGB 7.6* 7.3* 7.6* 7.7*   HCT 26.2* 25.3* 25.9* 25.9*   MCV 93 94 93 94   MCH 27.0 27.1 27.1 27.9   MCHC 29.0* 28.9* 29.3* 29.7*   RDW 17.2* 17.0* 17.0* 16.7*    431 435 482*     INR  Recent Labs   Lab 03/13/20  0551 03/12/20  0516 03/11/20  0543 03/10/20  0538   INR 2.76* 2.33* 2.29* 2.23*      Hepatic Panel   Lab Results   Component Value Date    AST 20 03/04/2020     Lab Results   Component Value Date    ALT 21 03/04/2020     Lab " Results   Component Value Date    ALBUMIN 1.8 03/04/2020     GLUCOSE:   Recent Labs   Lab 03/14/20  0739 03/14/20  0524 03/14/20  0215 03/13/20  2142 03/13/20  1701 03/13/20  1203 03/13/20  1138  03/13/20  0551  03/12/20  0516  03/11/20  0543  03/10/20  0538  03/09/20  0443   GLC  --  115*  --   --   --   --   --   --  120*  --  108*  --  132*  --  117*  --  122*   *  --  117* 153* 162* 138* 141*   < >  --    < >  --    < >  --    < >  --    < >  --     < > = values in this interval not displayed.       Imaging:  reviewed recent imaging      Assessment/Plan:     Eliseo Tanner is a 64 year old male with a PMH of chronic systolic heart failure, NICM, moderate CAD, HTN, ABHINAV on CPAP, DM2, CKDIII who is transferred to [facility] for evaluation and management of advanced heart failure with arrhythmia now s/p HM 3 on 2/18 with Dr. Jaramillo. 3/4 had hemothorax requiring a chest tube and to OR for VATS washout of right hemothorax on 3/6.     Neuro:   # Postop Pain  - Neuro intact  - Tylenol and oxycodone   # Depression/anxiety  - Fluoxetine 20 mg daily    CV:   # Endstage NICM S/P LVAD HM3  # RV dysfunction  - TTE 02/25 @5600 RPM, septum midline, mod-severe RV dysfunction, aortic valve closed. Current speed 5500  - Digoxin 125 mcg daily for RV support  - MAPs 70s-80s. Lisinopril 10 mg daily, hydralazine 100 mg q8H. Avoid BB with RV dysfunction  - Hypervolemic, improved. Lasix IV 40 03/113. Lasix PO 40 mg daily  - Aspirin on hold with recent hemothorax  - Warfarin to CFX goal 20-40. Therapeutic, stop bival.   - Right pleural chest tube with serous drainage, no air leak. Keep today, diurese  # Atrial fibrillation  # VT  - EP planning ICD prior to discharge, likely Monday if no further bleeding  - S/p DCCV 02/28  - Amiodarone 200 mg daily    Pulm:   # Postop mechanical ventilation, resolved  - Extubated POD 2 02/20  - Pulm toilet, IS, activity and deep breathing   - Supplemental O2 PRN to keep sats > 92%.   # ABHINAV  - Home  CPAP  # Right Hemothorax  - Chest tube placed 03/04  - VATS 3/6, extensive clot burden removed     ID:   # Proteus and enterococcus bacteremia  - Organisms growing from 2/2 blood cultures 2/13  - ID consulted, now following peripherally  ampicillin 2 grams q 6 hrs (14 days after removal of all central venous catheters (2/26 - 3/11))   - ID recommends surveillance cultures every week x 4 weeks after stopping all antibiotics (note on 2/25/20). Repeat cultures for surveillance ordered 3/13, NGTD.    - WBC WNL, afebrile, no signs or symptoms of infection     GI / FEN:  # Nutrition  - Reg diet, bowel regimen    Renal / :   # CKD stage 3  - Creatinine WNL, trend   - Diuresis as above  - Avoid nephroxtoxins   # Gout  - Allopurinol 200 mg daily    Heme:  # Acute postop blood loss anemia  - Hgb 7.3, Plts 431  - Transfuse PRN  # ANA  - Completed plasmapheresis preop   - Warfarin as above    Endo:   # DMT2  # Stress hyperglycemia  - Lantus 10 unit(s) at bedtime plus SSI    PPX:   - GI: Protonix  - DVT: warfarin    Dispo:   - 6C since 3/9  - Anticipate DC to TCU per PT/OT pending chest tube removal and ICD placement      Discussed with CVTS Fellow   Staff surgeons have been informed of changes through both  verbal and written communication.      Laura Drake PA-C  Cardiothoracic Surgery  Pager [phone]

## 2020-03-14 NOTE — PROCEDURES
The patient's HeartMate LVAD was interrogated 3/14/2020  * Speed 5500 rpm   * Pulsatility index 3.3   * Power 4.3 Driver   * Flow 4.3 L/minute   Fluid status: slightly hypervolemic   Alarms were reviewed, and notable for no events.   The driveline exit site was inspected, dressing cdi.   All external components were inspected and showed no evidence of damage or malfunction, none replaced.   No changes to VAD settings made

## 2020-03-14 NOTE — PLAN OF CARE
D: Pt with hx of chronic systolic heart failure, NICM, moderate CAD, HTN, ABHINAV on CPAP, DM2, CKDIII who transferred to [facility] for evaluation and management of advanced heart failure with arrhythmia now s/p HM 3 on 2/18 with Dr. Jaramillo. On 3/4 had hemothorax requiring a chest tube and to OR for VATS washout of right hemothorax on 3/6.     I/A: Pt alert and oriented x4. Pt A.Fib with HRs 60-90s. LVAD free of alarms this shift, numbers WDL. Dressing CDI. On RA with sats >92%. Home CPAP at the bedside and in use when sleeping. Pt reports R upper chest/incision pain, PRN tylenol, robaxin and IV dilaudid given 1x. 2 CTs continued to -20 suction, net output this shift 60 ml. Dressing change done, site WDL. Pt appetite good, denies nausea. BG checks done ACHS, sliding scale insulin given 1x, carb coverage insulin given 1x, evening glargine also given. Last BM this morning. Pt voiding adequately. Angiomax continued therapeutically at 0.02 mg/kg/min. INR this AM 2.76, evening dose of warfarin given. Lymphedema saw pt this afternoon, Comperm stockings on BLE.     P: Pt will need ICD placement prior to discharge. Continue to monitor and assess pt with every encounter. Notify CVTS with any changes or concerns.

## 2020-03-14 NOTE — PLAN OF CARE
D: Pt with hx of chronic systolic heart failure, NICM, moderate CAD, HTN, ABHINAV on CPAP, DM2, CKDIII who transferred to [facility] for evaluation and management of advanced heart failure with arrhythmia now s/p HM 3 on 2/18 with Dr. Jraamillo. On 3/4 had hemothorax requiring a chest tube and washout of right hemothorax on 3/6.     I: Monitored vitals and assessed pt status.     Running: Bivalrudin 0.02mcg/kg/min      A: A0x4. VSS, on home CPAP. Aflutter. Afebrile. FSG checked overnight 02, no insulin given. LVAD values WNL.     I/O this shift:  In: -   Out: 400 [Urine:400]    Temp:  [98  F (36.7  C)-98.7  F (37.1  C)] 98.4  F (36.9  C)  Pulse:  [73-93] 73  Heart Rate:  [] 83  Resp:  [16-18] 16  BP: ()/(59-83) 81/74  SpO2:  [91 %-98 %] 96 %      P: Continue to monitor Pt status and report changes to treatment team.

## 2020-03-14 NOTE — PLAN OF CARE
D: Patient was admitted for LVAD work-up. Now s/p HM3 LVAD on 2/18. PMHx chronic systolic HF, NICM, HTN, ABHINAV, DM2, CKD, Afib (cardioversion on 2/28).    I: Monitored vitals and assessed patient status. Angiomax was discontinued at 14:30  Running:right piv's x 2 are saline locked  PRN: dilaudid 2 mg every 4 hours and took it last at 14:40. He had it this morning too. Tylenol 650 mg every 4 hours and took it last at 11:56. Robaxin 500 mg 4 times a day and had it last at 11:56.    A: Patient's vital signs have been stable. He complained of right side chest pain where his CT's are. His LVAD and CT's dressings still need to be changed.. Rhythm was Aflutter  with a bundle branch block and an occasional PVC.    I/O this shift:  In: 800 [P.O.:800]  Out: 875 [Urine:825; Chest Tube:50]    Temp:  [98  F (36.7  C)-98.6  F (37  C)] 98.4  F (36.9  C)  Pulse:  [73] 73  Heart Rate:  [82-84] 84  Resp:  [16] 16  BP: ()/(64-90) 100/90  SpO2:  [91 %-96 %] 95 %      P: Continue to monitor patient status and report changes to the CVTS treatment team.

## 2020-03-14 NOTE — PLAN OF CARE
Discharge Planner PT / 6C   Patient plan for discharge: Rehab.  Current status: Pt completes supine > sit with Jose. Pt requires Jose-ModA for sit<>stand transfers, pending surface height. Pt ambulates ~250ft 2x with FWW and CGA. Pt performs standing LE strengthening exercises. Pt fatigues/becomes slightly SOB with activity, needing seated rest breaks throughout PT session. VSS on RA and LVAD #s WNL.  Barriers to return to prior living situation: Medical status, weakness, impaired balance, decreased activity tolerance, level of assist / current mobility.  Recommendations for discharge: ARU.  Rationale for recommendations: Pt below functional baseline. Recommend ARU for continued intensive therapy to progress IND/safety with mobility. Pt will be able to tolerate 3 hours of therapy/day. Pt with strong family support and very motivated.

## 2020-03-14 NOTE — PROGRESS NOTES
[facility]   Cardiology II Service / Advanced Heart Failure  Daily Consult Note      Patient: Eliseo Tanner  MRN: [id number]  Admission Date: 2/6/2020  Hospital Day # 37    Assessment and Plan: Eliseo Tanner is a 64 year old male with chronic systolic heart failure, NICM, moderate CAD, HTN, ABHINAV on CPAP, DM2, CKDIII who is transferred to [facility] for evaluation and management of advanced heart failure with arrhythmia now s/p HM 3 on 2/18.    Recommendations:  -ok to discontinue bival  -agree with IV lasix this AM    # Chronic systolic heart failure secondary to NICCM   # Severe mitral regurgitation  # s/p Heart Mate III LVAD   Stage D. NYHA Class IIIB. TTE 1/9/2020: LVEF 15-20%; LVEDd 5.9 cm; 4+ MR. Post op course c/b bleeding.     Fluid status: slightly hypervolemic, agree with increased lasix  RV support: digoxin 125 mcg daily, last level 0.8  ACEi/ARB: lisinopril 10 mg daily  Afterload reduction: hydralazine 100 mg tid  BB: defer due to recent LVAD  Aldosterone antagonist: deferred while other medical therapy is prioritized  SCD prophylaxis needs ICD prior to d/w   MAP: mostly 80s  LDH trends: 294 2/18  Anticoagulation: warfarin per pharmacy following CFx 32 today, on bival for HIT, ok to stop bival   Antiplatelet: no ASA while on bival given retrosternal hematome    #CAD  Cor angio 11/2019 w/ nonobstructive CAD w/ severe branch disease.  -holding asa as above  -continue atorvastatin 20 mg    #New atrial fibrillation  ECG obtained difficult to interpret given LVAD artifact, but afib w/ RVR and aberrancy vs. NATHALIA vs. AT vs. Slow VT. S/p DCCV on 2/28 back into sinus rhythm.  -on bival and warfarin as above  -no BB due to recent LVAD, on digoxin     #Retrosternal hematoma  CT chest 2/26: measuring 5.1 cm. S/P chest tube per CVTS 3/5. Chest tube drainage decreasing. Hgb stable mid 7s  -holding asa as above  -monitor symptoms and CBC     #Proteus and Enterococcus bacteremia, resolved   Blood cx  "2/13 positive 2/2. Blood cultures 2/15 +1/2 S.epi, likely contaminant per ID.  ID consulted, appreciate help.   -Zosyn (2/13-2/14) Tobramycin (2/13-2/14) Vancomycin (2/13-2/17) Meropenem (2/14-2/15) Ceftriaxone (2/16-2/28) -Ampicillin (2/16-3/11) (14 days after swan removal on 2/26)  -blood cx negative since 2/15, ordered [id number]      #Thrombocytopenia  HIT positive DAVINA negative. Antibody negative x2, thus no further indication for PLEX  -heme consulted  -Bivalirudin gtt, goal chromogenic level 20-40, ok to discontinue today     #VF s/p external shock  Shocked x 3 prior to transfer, loaded with amio. No known prior history.   -Keep K>4, Mg>2  -Amio 200 mg PO daily, will clarify duration   -Needs ICD for secondary prevention     #Atrial flutter   S/p cardioversion 2/28/20   - warfarin and Bival as above      Siena Aguiar, DNP, NP-C  Advanced Heart Failure/Cardiology II Service  Pager [phone] [facility] [phone]    ================================================================    Subjective/24-Hr Events:   Last 24 hr care team notes reviewed. No overnight events. No new complaints. Working well with therapies. No bleeding issues. Denies orthopnea, PND.    ROS:  4 point ROS including respiratory, CV, GI and  (other than that noted in the HPI) is negative.     Medications: Reviewed in EPIC.     Physical Exam:   BP (!) 100/90 (BP Location: Left arm)   Pulse 73   Temp 98.4  F (36.9  C) (Oral)   Resp 16   Ht 1.702 m (5' 7\")   Wt 96.5 kg (212 lb 11.9 oz)   SpO2 95%   BMI 33.32 kg/m      GENERAL: Appears comfortable, in no distress.  HEENT: Eye symmetrical, no discharge or icterus bilaterally. Mucous membranes moist and without lesions.  NECK: Supple, JVD 4cm above clavicle at 75 degrees.   CV: +mechanical LVAD hum  RESPIRATORY: Respirations regular, even, and unlabored. Lungs CTA throughout.   GI: Soft, obese and non distended with normoactive bowel sounds present in all quadrants. No tenderness, rebound, guarding. "   EXTREMITIES: 1+ peripheral edema. Non pulsatile.   NEUROLOGIC: Alert and oriented x 3. No focal deficits.   MUSCULOSKELETAL: No joint swelling or tenderness.   SKIN: No jaundice. No rashes or lesions. Sternum stable.     Labs:  CMP  Recent Labs   Lab 03/14/20  0524 03/13/20  0551 03/12/20  0516 03/11/20  0543    138 137 136   POTASSIUM 3.9 4.2 4.0 4.0   CHLORIDE 100 103 100 101   CO2 31 30 30 30   ANIONGAP 3 5 6 4   * 120* 108* 132*   BUN 22 21 19 24   CR 1.08 0.94 0.95 0.95   GFRESTIMATED 71 84 83 83   GFRESTBLACK 82 >90 >90 >90   CALOS 7.9* 8.1* 8.0* 7.9*   MAG 2.1 1.9 2.1 2.0       CBC  Recent Labs   Lab 03/14/20  0524 03/13/20  0551 03/12/20  0516 03/11/20  0543   WBC 8.9 9.0 9.3 10.5   RBC 2.82* 2.69* 2.80* 2.76*   HGB 7.6* 7.3* 7.6* 7.7*   HCT 26.2* 25.3* 25.9* 25.9*   MCV 93 94 93 94   MCH 27.0 27.1 27.1 27.9   MCHC 29.0* 28.9* 29.3* 29.7*   RDW 17.2* 17.0* 17.0* 16.7*    431 435 482*       INR  Recent Labs   Lab 03/13/20  0551 03/12/20  0516 03/11/20  0543 03/10/20  0538   INR 2.76* 2.33* 2.29* 2.23*       Time/Communication  I personally spent a total of 25 minutes. Of that 15 minutes was counseling/coordination of patient's care. Plan of care discussed with patient. See my note above for details.    Patient discussed with Dr. Cisneros.

## 2020-03-14 NOTE — PLAN OF CARE
Discharge Planner OT   Patient plan for discharge: rehab  Current status: Pt CGA for functional transfers. Pt completing 15 minutes on LE ergometer, tolerating well. All VSS on RA. LVAD values stable throughout. Pt completing standing ADLs with set up and CGA.   Barriers to return to prior living situation: deconditioning, medical status  Recommendations for discharge: ARU  Rationale for recommendations: Pt is below baseline and would benefit from continued skilled therapy to increase activity tolerance and independence with ADLs.  Pt can tolerate 3 hrs of therapy a day.        Entered by: Nataly Kim 03/14/2020 3:41 PM

## 2020-03-15 ENCOUNTER — APPOINTMENT (OUTPATIENT)
Dept: GENERAL RADIOLOGY | Facility: CLINIC | Age: 65
DRG: 001 | End: 2020-03-15
Attending: PHYSICIAN ASSISTANT
Payer: MEDICARE

## 2020-03-15 ENCOUNTER — APPOINTMENT (OUTPATIENT)
Dept: OCCUPATIONAL THERAPY | Facility: CLINIC | Age: 65
DRG: 001 | End: 2020-03-15
Attending: INTERNAL MEDICINE
Payer: MEDICARE

## 2020-03-15 LAB
ANION GAP SERPL CALCULATED.3IONS-SCNC: 4 MMOL/L (ref 3–14)
APTT PPP: 61 SEC (ref 22–37)
BASE EXCESS BLDA CALC-SCNC: 6.3 MMOL/L
BUN SERPL-MCNC: 22 MG/DL (ref 7–30)
CALCIUM SERPL-MCNC: 8.1 MG/DL (ref 8.5–10.1)
CHLORIDE SERPL-SCNC: 104 MMOL/L (ref 94–109)
CO2 SERPL-SCNC: 29 MMOL/L (ref 20–32)
CREAT SERPL-MCNC: 1.03 MG/DL (ref 0.66–1.25)
ERYTHROCYTE [DISTWIDTH] IN BLOOD BY AUTOMATED COUNT: 17.1 % (ref 10–15)
FACT X ACT/NOR PPP CHRO: 27 % (ref 70–130)
GFR SERPL CREATININE-BSD FRML MDRD: 75 ML/MIN/{1.73_M2}
GLUCOSE BLDC GLUCOMTR-MCNC: 112 MG/DL (ref 70–99)
GLUCOSE BLDC GLUCOMTR-MCNC: 123 MG/DL (ref 70–99)
GLUCOSE BLDC GLUCOMTR-MCNC: 140 MG/DL (ref 70–99)
GLUCOSE BLDC GLUCOMTR-MCNC: 150 MG/DL (ref 70–99)
GLUCOSE SERPL-MCNC: 108 MG/DL (ref 70–99)
HCO3 BLD-SCNC: 29 MMOL/L (ref 21–28)
HCT VFR BLD AUTO: 27.2 % (ref 40–53)
HGB BLD-MCNC: 7.6 G/DL (ref 13.3–17.7)
HGB BLD-MCNC: 8 G/DL (ref 13.3–17.7)
HGB BLD-MCNC: 8.8 G/DL (ref 13.3–17.7)
INR PPP: 2.97 (ref 0.86–1.14)
MAGNESIUM SERPL-MCNC: 2 MG/DL (ref 1.6–2.3)
MCH RBC QN AUTO: 27.4 PG (ref 26.5–33)
MCHC RBC AUTO-ENTMCNC: 29.4 G/DL (ref 31.5–36.5)
MCV RBC AUTO: 93 FL (ref 78–100)
O2/TOTAL GAS SETTING VFR VENT: 21 %
OXYHGB MFR BLD: 97 % (ref 92–100)
PCO2 BLD: 35 MM HG (ref 35–45)
PH BLD: 7.53 PH (ref 7.35–7.45)
PLATELET # BLD AUTO: 380 10E9/L (ref 150–450)
PO2 BLD: 126 MM HG (ref 80–105)
POTASSIUM SERPL-SCNC: 3.9 MMOL/L (ref 3.4–5.3)
RBC # BLD AUTO: 2.92 10E12/L (ref 4.4–5.9)
SODIUM SERPL-SCNC: 137 MMOL/L (ref 133–144)
WBC # BLD AUTO: 9.1 10E9/L (ref 4–11)

## 2020-03-15 PROCEDURE — 25000132 ZZH RX MED GY IP 250 OP 250 PS 637: Mod: GY | Performed by: PHYSICIAN ASSISTANT

## 2020-03-15 PROCEDURE — 25000132 ZZH RX MED GY IP 250 OP 250 PS 637: Mod: GY | Performed by: STUDENT IN AN ORGANIZED HEALTH CARE EDUCATION/TRAINING PROGRAM

## 2020-03-15 PROCEDURE — 85730 THROMBOPLASTIN TIME PARTIAL: CPT | Performed by: THORACIC SURGERY (CARDIOTHORACIC VASCULAR SURGERY)

## 2020-03-15 PROCEDURE — 36600 WITHDRAWAL OF ARTERIAL BLOOD: CPT

## 2020-03-15 PROCEDURE — 99233 SBSQ HOSP IP/OBS HIGH 50: CPT | Mod: 25 | Performed by: NURSE PRACTITIONER

## 2020-03-15 PROCEDURE — 36415 COLL VENOUS BLD VENIPUNCTURE: CPT | Performed by: PHYSICIAN ASSISTANT

## 2020-03-15 PROCEDURE — 21400000 ZZH R&B CCU UMMC

## 2020-03-15 PROCEDURE — 83735 ASSAY OF MAGNESIUM: CPT | Performed by: THORACIC SURGERY (CARDIOTHORACIC VASCULAR SURGERY)

## 2020-03-15 PROCEDURE — 85018 HEMOGLOBIN: CPT | Performed by: PHYSICIAN ASSISTANT

## 2020-03-15 PROCEDURE — 80048 BASIC METABOLIC PNL TOTAL CA: CPT | Performed by: THORACIC SURGERY (CARDIOTHORACIC VASCULAR SURGERY)

## 2020-03-15 PROCEDURE — 93750 INTERROGATION VAD IN PERSON: CPT | Performed by: NURSE PRACTITIONER

## 2020-03-15 PROCEDURE — 25000132 ZZH RX MED GY IP 250 OP 250 PS 637: Mod: GY | Performed by: THORACIC SURGERY (CARDIOTHORACIC VASCULAR SURGERY)

## 2020-03-15 PROCEDURE — 00000146 ZZHCL STATISTIC GLUCOSE BY METER IP

## 2020-03-15 PROCEDURE — 97535 SELF CARE MNGMENT TRAINING: CPT | Mod: GO

## 2020-03-15 PROCEDURE — 85610 PROTHROMBIN TIME: CPT | Performed by: THORACIC SURGERY (CARDIOTHORACIC VASCULAR SURGERY)

## 2020-03-15 PROCEDURE — 71045 X-RAY EXAM CHEST 1 VIEW: CPT

## 2020-03-15 PROCEDURE — 36415 COLL VENOUS BLD VENIPUNCTURE: CPT | Performed by: THORACIC SURGERY (CARDIOTHORACIC VASCULAR SURGERY)

## 2020-03-15 PROCEDURE — 82805 BLOOD GASES W/O2 SATURATION: CPT | Performed by: INTERNAL MEDICINE

## 2020-03-15 PROCEDURE — 85027 COMPLETE CBC AUTOMATED: CPT | Performed by: THORACIC SURGERY (CARDIOTHORACIC VASCULAR SURGERY)

## 2020-03-15 PROCEDURE — 25000132 ZZH RX MED GY IP 250 OP 250 PS 637: Mod: GY | Performed by: NURSE PRACTITIONER

## 2020-03-15 PROCEDURE — 85260 CLOT FACTOR X STUART-POWER: CPT | Performed by: THORACIC SURGERY (CARDIOTHORACIC VASCULAR SURGERY)

## 2020-03-15 RX ORDER — ASPIRIN 81 MG/1
81 TABLET ORAL DAILY
Status: DISCONTINUED | OUTPATIENT
Start: 2020-03-15 | End: 2020-03-20 | Stop reason: HOSPADM

## 2020-03-15 RX ORDER — ASPIRIN 81 MG/1
81 TABLET ORAL DAILY
Status: DISCONTINUED | OUTPATIENT
Start: 2020-03-15 | End: 2020-03-15

## 2020-03-15 RX ORDER — WARFARIN SODIUM 3 MG/1
6 TABLET ORAL
Status: COMPLETED | OUTPATIENT
Start: 2020-03-15 | End: 2020-03-15

## 2020-03-15 RX ADMIN — ATORVASTATIN CALCIUM 20 MG: 20 TABLET, FILM COATED ORAL at 20:22

## 2020-03-15 RX ADMIN — MAGNESIUM OXIDE 400 MG: 400 TABLET ORAL at 08:07

## 2020-03-15 RX ADMIN — AMIODARONE HYDROCHLORIDE 200 MG: 200 TABLET ORAL at 08:07

## 2020-03-15 RX ADMIN — LISINOPRIL 10 MG: 10 TABLET ORAL at 08:06

## 2020-03-15 RX ADMIN — HYDRALAZINE HYDROCHLORIDE 100 MG: 100 TABLET, FILM COATED ORAL at 22:42

## 2020-03-15 RX ADMIN — FUROSEMIDE 40 MG: 40 TABLET ORAL at 08:08

## 2020-03-15 RX ADMIN — INSULIN ASPART 1 UNITS: 100 INJECTION, SOLUTION INTRAVENOUS; SUBCUTANEOUS at 22:59

## 2020-03-15 RX ADMIN — Medication 200 MG: at 08:06

## 2020-03-15 RX ADMIN — HYDRALAZINE HYDROCHLORIDE 100 MG: 100 TABLET, FILM COATED ORAL at 06:47

## 2020-03-15 RX ADMIN — WARFARIN SODIUM 6 MG: 3 TABLET ORAL at 17:49

## 2020-03-15 RX ADMIN — HYDRALAZINE HYDROCHLORIDE 100 MG: 100 TABLET, FILM COATED ORAL at 13:37

## 2020-03-15 RX ADMIN — ALLOPURINOL 200 MG: 100 TABLET ORAL at 08:07

## 2020-03-15 RX ADMIN — ASPIRIN 81 MG CHEWABLE TABLET 81 MG: 81 TABLET CHEWABLE at 17:49

## 2020-03-15 RX ADMIN — PANTOPRAZOLE SODIUM 40 MG: 40 TABLET, DELAYED RELEASE ORAL at 08:08

## 2020-03-15 RX ADMIN — MULTIPLE VITAMINS W/ MINERALS TAB 1 TABLET: TAB at 08:07

## 2020-03-15 RX ADMIN — INSULIN ASPART 1 UNITS: 100 INJECTION, SOLUTION INTRAVENOUS; SUBCUTANEOUS at 17:57

## 2020-03-15 RX ADMIN — METHOCARBAMOL 500 MG: 500 TABLET, FILM COATED ORAL at 20:22

## 2020-03-15 RX ADMIN — POTASSIUM CHLORIDE 20 MEQ: 750 TABLET, EXTENDED RELEASE ORAL at 08:09

## 2020-03-15 RX ADMIN — POTASSIUM CHLORIDE 20 MEQ: 750 TABLET, EXTENDED RELEASE ORAL at 08:06

## 2020-03-15 RX ADMIN — FLUOXETINE 20 MG: 20 CAPSULE ORAL at 08:08

## 2020-03-15 RX ADMIN — DIGOXIN 125 MCG: 125 TABLET ORAL at 08:08

## 2020-03-15 ASSESSMENT — ACTIVITIES OF DAILY LIVING (ADL)
ADLS_ACUITY_SCORE: 16

## 2020-03-15 ASSESSMENT — MIFFLIN-ST. JEOR: SCORE: 1645.37

## 2020-03-15 NOTE — PROCEDURES
The patient's HeartMate LVAD was interrogated 3/15/2020  * Speed 5500 rpm   * Pulsatility index 3.5  * Power 4.2 Driver   * Flow 4.1-4.3 L/minute   Fluid status: slightly hypervolemic, improved  Alarms were reviewed, and notable for no events.   The driveline exit site was inspected, dressing cdi.   All external components were inspected and showed no evidence of damage or malfunction, none replaced.   No changes to VAD settings made

## 2020-03-15 NOTE — PLAN OF CARE
D: PMH of CSHF, NICM, CAD, HTN, ABHINAV on home CPAP, DM2, CKDIII, came due to  evaluation and management of advanced heart failure with arrhythmia, HM 3 on 2/18. 3/4 had hemothorax requiring a chest tube on 3/6. Afib (cardioversion on 2/28)      I: Monitored vitals and assessed pt status.   Changed: Xarelto oral   PRN: Robaxin prn     A: A0x4. VSS, on CIPAP. Aflutter with 80-81,  Afebrile. R back and incisional pain controled with Tylenol and Lidocaine patches applied with effect.CT to - 20 suc ,with 50 ml output,  dressings CDI. Blood sugars 134 at bedtime,112 overnight.LVAD #s stable, no alarms overnight.  LWs on BLE      Temp:  [97.4  F (36.3  C)-99  F (37.2  C)] 99  F (37.2  C)  Heart Rate:  [80-93] 82  Resp:  [16] 16  BP: ()/(74-90) 97/86  SpO2:  [95 %-97 %] 97 %      P: Continue to monitor Pt status and report changes to treatment team.Plan for ICD placement on Monday per RN report.

## 2020-03-15 NOTE — PLAN OF CARE
D: Patient was admitted for LVAD work-up. Now s/p HM3 LVAD on 2/18. PMHx chronic systolic HF, NICM, HTN, ABHINAV, DM2, CKD, Afib (cardioversion on 2/28).    I: Monitored vitals and assessed patient status. Patient will get 6 mg of Coumadin  at 18:00 along with 81 mg of Aspirin.   Running: right piv's x 2 are saline locked    A: Patient's vital signs have been stable and he denies pain. His rhythm was Afib/Aflutter with a bundle branch block (0.12)  with an occasional PVC. His LVAD dressing was changed, His 2 CT's were removed today.    I/O this shift:  In: 1130 [P.O.:1130]  Out: 2055 [Urine:2025; Chest Tube:30]    Temp:  [97.4  F (36.3  C)-99  F (37.2  C)] 98.4  F (36.9  C)  Pulse:  [82] 82  Heart Rate:  [70-93] 84  Resp:  [16-18] 18  BP: ()/(73-89) 97/73  SpO2:  [95 %-97 %] 95 %      P: Continue to monitor patient status and report changes to the CVTS  treatment team.

## 2020-03-15 NOTE — PLAN OF CARE
OT/Edema 6C: Patient tolerated wear of size F Comperm compression stockings well with only slight pitting edema remaining near toe line. Appropriate for patient to proceed with wear of Comperm for edema management purposes (mainly during daytime, can be removed at night). Patient Care Order in place and stockings to be terminated entirely if increased pain, numbness/tingling or soiling occurs.

## 2020-03-15 NOTE — PROGRESS NOTES
[facility]   Cardiology II Service / Advanced Heart Failure  Daily Consult Note      Patient: Eliseo Tanner  MRN: [id number]  Admission Date: 2/6/2020  Hospital Day # 38    Assessment and Plan: Eliseo Tanner is a 64 year old male with chronic systolic heart failure, NICM, moderate CAD, HTN, ABHINAV on CPAP, DM2, CKDIII who is transferred to [facility] for evaluation and management of advanced heart failure with arrhythmia now s/p HM 3 on 2/18.    Recommendations:  -recommend lasix 40 mg bid  -increase lisinopril if MAP>85  -start ASA 81 mg tonight  -ICD tomorrow    # Chronic systolic heart failure secondary to NICCM   # Severe mitral regurgitation  # s/p Heart Mate III LVAD   Stage D. NYHA Class IIIB. TTE 1/9/2020: LVEF 15-20%; LVEDd 5.9 cm; 4+ MR. Post op course c/b bleeding.     Fluid status: slightly hypervolemic, rec lasix 40 mg bid  RV support: digoxin 125 mcg daily, last level 0.8  ACEi/ARB: lisinopril 10 mg daily  Afterload reduction: hydralazine 100 mg tid  BB: defer due to recent LVAD  Aldosterone antagonist: deferred while other medical therapy is prioritized  SCD prophylaxis: ICD implant tomorrow  MAP: 80s-90s  LDH trends: 294 2/18  Anticoagulation: warfarin per pharmacy following CFx 27 today, bival off 3/14  Antiplatelet: start ASA 81 mg after CT pulled    #CAD  Cor angio 11/2019 w/ nonobstructive CAD w/ severe branch disease.  -resume ASA  -continue atorvastatin 20 mg    #New atrial fibrillation s/p DCCV  ECG obtained difficult to interpret given LVAD artifact, but afib w/ RVR and aberrancy vs. senior care vs. AT vs. Slow VT. S/p DCCV on 2/28 back into sinus rhythm.  -warfarin as above  -no BB due to recent LVAD, on digoxin     #Retrosternal hematoma  CT chest 2/26: measuring 5.1 cm. S/P chest tube per CVTS 3/5. Chest tube drainage decreasing. Hgb stable mid 7s  -monitor with resuming ASA  -monitor symptoms and CBC     #Proteus and Enterococcus bacteremia, resolved   Blood cx 2/13 positive  "2/2. Blood cultures 2/15 +1/2 S.epi, likely contaminant per ID.  ID consulted, appreciate help. Zosyn (2/13-2/14) Tobramycin (2/13-2/14) Vancomycin (2/13-2/17) Meropenem (2/14-2/15) Ceftriaxone (2/16-2/28) -Ampicillin (2/16-3/11) (14 days after swan removal on 2/26).  -blood cx negative since 2/15, ordered [id number]      #Thrombocytopenia  HIT positive DAVINA negative. Antibody negative x2, thus no further indication for PLEX  -heme consulted  -s/p Bivalirudin gtt, goal chromogenic level 20-40, discontinued 3/14     #VF s/p external shock  Shocked x 3 prior to transfer, loaded with amio. No known prior history.   -Keep K>4, Mg>2  -amio 200 mg PO daily  -ICD tomorrow     #Atrial flutter   S/p cardioversion 2/28/20   - warfarin and Bival as above      Siena Aguiar, CORY, NP-C  Advanced Heart Failure/Cardiology II Service  Pager [phone] [facility] [phone]    ================================================================    Subjective/24-Hr Events:   Last 24 hr care team notes reviewed. No overnight events. No new complaints. Diuresed well yesterday, abdomen feels less bloated. Denies LE edema, orthopnea, MIRANDA. No lightheadedness. No bleeding issues.     ROS:  4 point ROS including respiratory, CV, GI and  (other than that noted in the HPI) is negative.     Medications: Reviewed in EPIC.     Physical Exam:   BP 97/73 (BP Location: Left arm)   Pulse 82   Temp 98.4  F (36.9  C) (Oral)   Resp 18   Ht 1.702 m (5' 7\")   Wt 96.5 kg (212 lb 11.9 oz)   SpO2 95%   BMI 33.32 kg/m      GENERAL: Appears comfortable, in no distress.  HEENT: Eye symmetrical, no discharge or icterus bilaterally. Mucous membranes moist and without lesions.  NECK: Supple, JVD 2cm above clavicle at 75 degrees.   CV: +mechanical LVAD hum  RESPIRATORY: Respirations regular, even, and unlabored. Lungs CTA throughout.   GI: Soft, obese and non distended with normoactive bowel sounds present in all quadrants. No tenderness, rebound, guarding.   EXTREMITIES: " Trace  peripheral edema. Non pulsatile.   NEUROLOGIC: Alert and oriented x 3. No focal deficits.   MUSCULOSKELETAL: No joint swelling or tenderness.   SKIN: No jaundice. No rashes or lesions. Sternum stable.     Labs:  CMP  Recent Labs   Lab 03/15/20  0655 03/14/20  0524 03/13/20  0551 03/12/20  0516    134 138 137   POTASSIUM 3.9 3.9 4.2 4.0   CHLORIDE 104 100 103 100   CO2 29 31 30 30   ANIONGAP 4 3 5 6   * 115* 120* 108*   BUN 22 22 21 19   CR 1.03 1.08 0.94 0.95   GFRESTIMATED 75 71 84 83   GFRESTBLACK 87 82 >90 >90   CALOS 8.1* 7.9* 8.1* 8.0*   MAG 2.0 2.1 1.9 2.1       CBC  Recent Labs   Lab 03/15/20  0655 03/14/20  0524 03/13/20  0551 03/12/20  0516   WBC 9.1 8.9 9.0 9.3   RBC 2.92* 2.82* 2.69* 2.80*   HGB 8.0* 7.6* 7.3* 7.6*   HCT 27.2* 26.2* 25.3* 25.9*   MCV 93 93 94 93   MCH 27.4 27.0 27.1 27.1   MCHC 29.4* 29.0* 28.9* 29.3*   RDW 17.1* 17.2* 17.0* 17.0*    427 431 435       INR  Recent Labs   Lab 03/15/20  0655 03/13/20  0551 03/12/20  0516 03/11/20  0543   INR 2.97* 2.76* 2.33* 2.29*       Time/Communication  I personally spent a total of 25 minutes. Of that 15 minutes was counseling/coordination of patient's care. Plan of care discussed with patient. See my note above for details.    Patient discussed with Dr. Cisneros.

## 2020-03-16 ENCOUNTER — APPOINTMENT (OUTPATIENT)
Dept: GENERAL RADIOLOGY | Facility: CLINIC | Age: 65
DRG: 001 | End: 2020-03-16
Attending: STUDENT IN AN ORGANIZED HEALTH CARE EDUCATION/TRAINING PROGRAM
Payer: MEDICARE

## 2020-03-16 ENCOUNTER — APPOINTMENT (OUTPATIENT)
Dept: GENERAL RADIOLOGY | Facility: CLINIC | Age: 65
DRG: 001 | End: 2020-03-16
Attending: PHYSICIAN ASSISTANT
Payer: MEDICARE

## 2020-03-16 PROBLEM — Z95.811 LVAD (LEFT VENTRICULAR ASSIST DEVICE) PRESENT (H): Status: ACTIVE | Noted: 2020-03-16

## 2020-03-16 LAB
ANION GAP SERPL CALCULATED.3IONS-SCNC: 5 MMOL/L (ref 3–14)
APTT PPP: 68 SEC (ref 22–37)
BUN SERPL-MCNC: 25 MG/DL (ref 7–30)
CALCIUM SERPL-MCNC: 8.4 MG/DL (ref 8.5–10.1)
CHLORIDE SERPL-SCNC: 103 MMOL/L (ref 94–109)
CO2 SERPL-SCNC: 28 MMOL/L (ref 20–32)
CREAT SERPL-MCNC: 1.08 MG/DL (ref 0.66–1.25)
ERYTHROCYTE [DISTWIDTH] IN BLOOD BY AUTOMATED COUNT: 17.3 % (ref 10–15)
FACT X ACT/NOR PPP CHRO: 27 % (ref 70–130)
GFR SERPL CREATININE-BSD FRML MDRD: 71 ML/MIN/{1.73_M2}
GLUCOSE BLDC GLUCOMTR-MCNC: 105 MG/DL (ref 70–99)
GLUCOSE BLDC GLUCOMTR-MCNC: 112 MG/DL (ref 70–99)
GLUCOSE BLDC GLUCOMTR-MCNC: 113 MG/DL (ref 70–99)
GLUCOSE BLDC GLUCOMTR-MCNC: 117 MG/DL (ref 70–99)
GLUCOSE BLDC GLUCOMTR-MCNC: 133 MG/DL (ref 70–99)
GLUCOSE BLDC GLUCOMTR-MCNC: 154 MG/DL (ref 70–99)
GLUCOSE SERPL-MCNC: 114 MG/DL (ref 70–99)
HCT VFR BLD AUTO: 28 % (ref 40–53)
HGB BLD-MCNC: 8.1 G/DL (ref 13.3–17.7)
INR PPP: 3.15 (ref 0.86–1.14)
INTERPRETATION ECG - MUSE: NORMAL
MAGNESIUM SERPL-MCNC: 1.9 MG/DL (ref 1.6–2.3)
MCH RBC QN AUTO: 26.6 PG (ref 26.5–33)
MCHC RBC AUTO-ENTMCNC: 28.9 G/DL (ref 31.5–36.5)
MCV RBC AUTO: 92 FL (ref 78–100)
PLATELET # BLD AUTO: 379 10E9/L (ref 150–450)
POTASSIUM SERPL-SCNC: 4 MMOL/L (ref 3.4–5.3)
RBC # BLD AUTO: 3.05 10E12/L (ref 4.4–5.9)
SODIUM SERPL-SCNC: 136 MMOL/L (ref 133–144)
WBC # BLD AUTO: 9.9 10E9/L (ref 4–11)

## 2020-03-16 PROCEDURE — 21400000 ZZH R&B CCU UMMC

## 2020-03-16 PROCEDURE — 85610 PROTHROMBIN TIME: CPT | Performed by: THORACIC SURGERY (CARDIOTHORACIC VASCULAR SURGERY)

## 2020-03-16 PROCEDURE — 25000132 ZZH RX MED GY IP 250 OP 250 PS 637: Mod: GY | Performed by: PHYSICIAN ASSISTANT

## 2020-03-16 PROCEDURE — C1777 LEAD, AICD, ENDO SINGLE COIL: HCPCS | Performed by: INTERNAL MEDICINE

## 2020-03-16 PROCEDURE — 93005 ELECTROCARDIOGRAM TRACING: CPT

## 2020-03-16 PROCEDURE — 33249 INSJ/RPLCMT DEFIB W/LEAD(S): CPT | Performed by: INTERNAL MEDICINE

## 2020-03-16 PROCEDURE — 25000128 H RX IP 250 OP 636: Performed by: STUDENT IN AN ORGANIZED HEALTH CARE EDUCATION/TRAINING PROGRAM

## 2020-03-16 PROCEDURE — 99153 MOD SED SAME PHYS/QHP EA: CPT | Performed by: INTERNAL MEDICINE

## 2020-03-16 PROCEDURE — 25000128 H RX IP 250 OP 636: Performed by: INTERNAL MEDICINE

## 2020-03-16 PROCEDURE — 80048 BASIC METABOLIC PNL TOTAL CA: CPT | Performed by: THORACIC SURGERY (CARDIOTHORACIC VASCULAR SURGERY)

## 2020-03-16 PROCEDURE — 25000132 ZZH RX MED GY IP 250 OP 250 PS 637: Mod: GY | Performed by: NURSE PRACTITIONER

## 2020-03-16 PROCEDURE — C1722 AICD, SINGLE CHAMBER: HCPCS | Performed by: INTERNAL MEDICINE

## 2020-03-16 PROCEDURE — 25000132 ZZH RX MED GY IP 250 OP 250 PS 637: Mod: GY | Performed by: STUDENT IN AN ORGANIZED HEALTH CARE EDUCATION/TRAINING PROGRAM

## 2020-03-16 PROCEDURE — C1894 INTRO/SHEATH, NON-LASER: HCPCS | Performed by: INTERNAL MEDICINE

## 2020-03-16 PROCEDURE — 40000986 XR CHEST PORT 1 VW

## 2020-03-16 PROCEDURE — 93750 INTERROGATION VAD IN PERSON: CPT | Performed by: NURSE PRACTITIONER

## 2020-03-16 PROCEDURE — 02HK3KZ INSERTION OF DEFIBRILLATOR LEAD INTO RIGHT VENTRICLE, PERCUTANEOUS APPROACH: ICD-10-PCS | Performed by: INTERNAL MEDICINE

## 2020-03-16 PROCEDURE — 93010 ELECTROCARDIOGRAM REPORT: CPT | Mod: 59 | Performed by: INTERNAL MEDICINE

## 2020-03-16 PROCEDURE — 83735 ASSAY OF MAGNESIUM: CPT | Performed by: THORACIC SURGERY (CARDIOTHORACIC VASCULAR SURGERY)

## 2020-03-16 PROCEDURE — 40000141 ZZH STATISTIC PERIPHERAL IV START W/O US GUIDANCE

## 2020-03-16 PROCEDURE — 99152 MOD SED SAME PHYS/QHP 5/>YRS: CPT | Performed by: INTERNAL MEDICINE

## 2020-03-16 PROCEDURE — 36415 COLL VENOUS BLD VENIPUNCTURE: CPT | Performed by: THORACIC SURGERY (CARDIOTHORACIC VASCULAR SURGERY)

## 2020-03-16 PROCEDURE — 40000275 ZZH STATISTIC RCP TIME EA 10 MIN

## 2020-03-16 PROCEDURE — 85730 THROMBOPLASTIN TIME PARTIAL: CPT | Performed by: THORACIC SURGERY (CARDIOTHORACIC VASCULAR SURGERY)

## 2020-03-16 PROCEDURE — 85027 COMPLETE CBC AUTOMATED: CPT | Performed by: THORACIC SURGERY (CARDIOTHORACIC VASCULAR SURGERY)

## 2020-03-16 PROCEDURE — 99232 SBSQ HOSP IP/OBS MODERATE 35: CPT | Mod: 25 | Performed by: NURSE PRACTITIONER

## 2020-03-16 PROCEDURE — 93010 ELECTROCARDIOGRAM REPORT: CPT | Mod: 76 | Performed by: INTERNAL MEDICINE

## 2020-03-16 PROCEDURE — 25000132 ZZH RX MED GY IP 250 OP 250 PS 637: Mod: GY

## 2020-03-16 PROCEDURE — 25000125 ZZHC RX 250: Performed by: INTERNAL MEDICINE

## 2020-03-16 PROCEDURE — 71045 X-RAY EXAM CHEST 1 VIEW: CPT

## 2020-03-16 PROCEDURE — 00000146 ZZHCL STATISTIC GLUCOSE BY METER IP

## 2020-03-16 PROCEDURE — 0JH608Z INSERTION OF DEFIBRILLATOR GENERATOR INTO CHEST SUBCUTANEOUS TISSUE AND FASCIA, OPEN APPROACH: ICD-10-PCS | Performed by: INTERNAL MEDICINE

## 2020-03-16 PROCEDURE — 27210794 ZZH OR GENERAL SUPPLY STERILE: Performed by: INTERNAL MEDICINE

## 2020-03-16 PROCEDURE — 25000128 H RX IP 250 OP 636: Performed by: PHYSICIAN ASSISTANT

## 2020-03-16 PROCEDURE — 85260 CLOT FACTOR X STUART-POWER: CPT | Performed by: THORACIC SURGERY (CARDIOTHORACIC VASCULAR SURGERY)

## 2020-03-16 DEVICE — ICD VISIA AF MRI VR 1D4 DVFB1D4: Type: IMPLANTABLE DEVICE | Status: FUNCTIONAL

## 2020-03-16 RX ORDER — CEFAZOLIN SODIUM 2 G/100ML
2 INJECTION, SOLUTION INTRAVENOUS
Status: COMPLETED | OUTPATIENT
Start: 2020-03-16 | End: 2020-03-16

## 2020-03-16 RX ORDER — CEPHALEXIN 500 MG/1
500 TABLET ORAL 3 TIMES DAILY
Qty: 15 TABLET | Refills: 0 | Status: SHIPPED | OUTPATIENT
Start: 2020-03-16 | End: 2020-03-21

## 2020-03-16 RX ORDER — CEFAZOLIN SODIUM 2 G/100ML
2 INJECTION, SOLUTION INTRAVENOUS EVERY 8 HOURS
Status: COMPLETED | OUTPATIENT
Start: 2020-03-16 | End: 2020-03-17

## 2020-03-16 RX ORDER — LIDOCAINE 40 MG/G
CREAM TOPICAL
Status: DISCONTINUED | OUTPATIENT
Start: 2020-03-16 | End: 2020-03-16 | Stop reason: HOSPADM

## 2020-03-16 RX ORDER — ATROPINE SULFATE 0.1 MG/ML
0.5 INJECTION INTRAVENOUS EVERY 5 MIN PRN
Status: ACTIVE | OUTPATIENT
Start: 2020-03-16 | End: 2020-03-17

## 2020-03-16 RX ORDER — LIDOCAINE HYDROCHLORIDE 20 MG/ML
INJECTION, SOLUTION INFILTRATION; PERINEURAL
Status: DISCONTINUED | OUTPATIENT
Start: 2020-03-16 | End: 2020-03-16 | Stop reason: HOSPADM

## 2020-03-16 RX ORDER — ACETAMINOPHEN 325 MG/1
650 TABLET ORAL EVERY 4 HOURS PRN
Status: DISCONTINUED | OUTPATIENT
Start: 2020-03-16 | End: 2020-03-20 | Stop reason: HOSPADM

## 2020-03-16 RX ORDER — WARFARIN SODIUM 5 MG/1
5 TABLET ORAL
Status: COMPLETED | OUTPATIENT
Start: 2020-03-16 | End: 2020-03-16

## 2020-03-16 RX ORDER — FLUMAZENIL 0.1 MG/ML
0.2 INJECTION, SOLUTION INTRAVENOUS
Status: ACTIVE | OUTPATIENT
Start: 2020-03-16 | End: 2020-03-17

## 2020-03-16 RX ORDER — FENTANYL CITRATE 50 UG/ML
INJECTION, SOLUTION INTRAMUSCULAR; INTRAVENOUS
Status: DISCONTINUED | OUTPATIENT
Start: 2020-03-16 | End: 2020-03-16 | Stop reason: HOSPADM

## 2020-03-16 RX ORDER — SODIUM CHLORIDE 9 MG/ML
INJECTION, SOLUTION INTRAVENOUS CONTINUOUS
Status: DISCONTINUED | OUTPATIENT
Start: 2020-03-16 | End: 2020-03-16 | Stop reason: HOSPADM

## 2020-03-16 RX ORDER — NALOXONE HYDROCHLORIDE 0.4 MG/ML
.2-.4 INJECTION, SOLUTION INTRAMUSCULAR; INTRAVENOUS; SUBCUTANEOUS
Status: ACTIVE | OUTPATIENT
Start: 2020-03-16 | End: 2020-03-17

## 2020-03-16 RX ADMIN — MULTIPLE VITAMINS W/ MINERALS TAB 1 TABLET: TAB at 08:23

## 2020-03-16 RX ADMIN — Medication 200 MG: at 08:22

## 2020-03-16 RX ADMIN — HYDRALAZINE HYDROCHLORIDE 100 MG: 100 TABLET, FILM COATED ORAL at 16:26

## 2020-03-16 RX ADMIN — LISINOPRIL 10 MG: 10 TABLET ORAL at 08:23

## 2020-03-16 RX ADMIN — DIGOXIN 125 MCG: 125 TABLET ORAL at 08:23

## 2020-03-16 RX ADMIN — ASPIRIN 81 MG CHEWABLE TABLET 81 MG: 81 TABLET CHEWABLE at 08:22

## 2020-03-16 RX ADMIN — HYDRALAZINE HYDROCHLORIDE 100 MG: 100 TABLET, FILM COATED ORAL at 22:05

## 2020-03-16 RX ADMIN — FUROSEMIDE 40 MG: 40 TABLET ORAL at 08:23

## 2020-03-16 RX ADMIN — MAGNESIUM OXIDE 400 MG: 400 TABLET ORAL at 08:22

## 2020-03-16 RX ADMIN — POTASSIUM CHLORIDE 20 MEQ: 750 TABLET, EXTENDED RELEASE ORAL at 08:22

## 2020-03-16 RX ADMIN — ALLOPURINOL 200 MG: 100 TABLET ORAL at 08:23

## 2020-03-16 RX ADMIN — ATORVASTATIN CALCIUM 20 MG: 20 TABLET, FILM COATED ORAL at 19:44

## 2020-03-16 RX ADMIN — METHOCARBAMOL 500 MG: 500 TABLET, FILM COATED ORAL at 19:44

## 2020-03-16 RX ADMIN — FLUOXETINE 20 MG: 20 CAPSULE ORAL at 08:23

## 2020-03-16 RX ADMIN — AMIODARONE HYDROCHLORIDE 200 MG: 200 TABLET ORAL at 08:23

## 2020-03-16 RX ADMIN — MAGNESIUM SULFATE 2 G: 2 INJECTION INTRAVENOUS at 18:41

## 2020-03-16 RX ADMIN — CEFAZOLIN SODIUM 2 G: 2 INJECTION, SOLUTION INTRAVENOUS at 22:05

## 2020-03-16 RX ADMIN — HYDRALAZINE HYDROCHLORIDE 100 MG: 100 TABLET, FILM COATED ORAL at 06:05

## 2020-03-16 RX ADMIN — PANTOPRAZOLE SODIUM 40 MG: 40 TABLET, DELAYED RELEASE ORAL at 08:23

## 2020-03-16 RX ADMIN — POTASSIUM CHLORIDE 20 MEQ: 750 TABLET, EXTENDED RELEASE ORAL at 06:05

## 2020-03-16 RX ADMIN — WARFARIN SODIUM 5 MG: 5 TABLET ORAL at 18:33

## 2020-03-16 ASSESSMENT — ACTIVITIES OF DAILY LIVING (ADL)
ADLS_ACUITY_SCORE: 16

## 2020-03-16 ASSESSMENT — PAIN DESCRIPTION - DESCRIPTORS: DESCRIPTORS: BURNING

## 2020-03-16 NOTE — PLAN OF CARE
"BP 99/81 (BP Location: Left arm)   Pulse 82   Temp 98.6  F (37  C) (Oral)   Resp 16   Ht 1.702 m (5' 7\")   Wt 90.7 kg (199 lb 14.4 oz)   SpO2 96%   BMI 31.31 kg/m      D: Patient was admitted on 2/6/2020 for evaluation and management of advance heart s/p LVAD HM3.  I/A: Patient had 2-chest tubes removed yesterday, the site is C/D/I, LVAD #s WNL, denies pain and nausea, Received 10 lantus at HS, and used the CPAP while asleep.  BS under 140 and there was no need for insulin coverage. 20mEq of potassium was given.  Voiding via beside urinal.  Patient had ABG drawn, SL PIBx2, up to commode and voiding at bedside urinal.  P: Will continue to monitor.    "

## 2020-03-16 NOTE — PROGRESS NOTES
SPIRITUAL HEALTH SERVICES  SPIRITUAL ASSESSMENT Progress Note  [facility] ([place] 6C     Supportive visit with pt and spouse, in anticipation of ICD placement scheduled for this afternoon. We mostly talked about pt's anticipated transfer to rehab after getting ICD. Pt expressed a positive and hopeful attitude regarding what he expects it will be like on rehab unit. Prayer and blessing were shared.    PLAN: continue to follow, visiting again this week if pt still on unit. Pt will appreciate  support when he is on rehab unit.    Ad Mccoy) Hernandez Krause M.Div., [facility]  Staff   Pager [phone]

## 2020-03-16 NOTE — PROGRESS NOTES
LVAD Social Work Services Progress Note      Date of Initial Social Work Evaluation: 2/10/2020  Collaborated with: CVTS, [facility] admissions     Data: Pt is s/p HM3 LVAD implant on 2/18. Post-op complicated by hemothorax and chest tubes. Pt had chest tubes removed yesterday. Pt having ICD implant today and anticipated pt will be ready for discharge tomorrow. Updated [facility] of plan of care and anticipated that pt will be ready for discharge tomorrow.     Intervention: Discharge Planning   Assessment: Did not meet w/ pt today as he was at procedure. SW has had discussions throughout hospitalization around discharge planning and transition to ARU.   Education provided by VANITA: None today   Plan:    Discharge Plans in Progress:  ARU assessing for admission tomorrow.     Barriers to d/c plan: Medical Readiness, Bed at ARU     Follow up Plan: SW to f/u with [facility], CVTS and pt tomorrow.

## 2020-03-16 NOTE — PLAN OF CARE
D: Admitted 2/06 for advanced heart failure options, s/p LVAD HM 3 on 2/18.     I: Monitored vitals and assessed pt status.   Changed:  Single lead ICD placed  K of 4.0 replaced per protocol, recheck in AM  Mg of 1.9 replaced per protocol, recheck in AM    Running:  N/A    PRN:  N/A    A: VSS, A&O, up SBA. Pt denies pain throughout shift. Oxygen remains stable on RA. Tele A flutter, HR 70's. Adequate urine output today.     Temp:  [98.1  F (36.7  C)-98.8  F (37.1  C)] 98.1  F (36.7  C)  Heart Rate:  [72-88] 74  Resp:  [15-18] 16  BP: ()/(57-86) 66/57  SpO2:  [93 %-100 %] 93 %      P: Continue to monitor Pt status and report changes to treatment team.

## 2020-03-16 NOTE — PLAN OF CARE
6C PT - Cancel. Pt OOR at procedure upon check in, in the PM. Will check back in later PM if PT's schedule permits otherwise, will reschedule.

## 2020-03-16 NOTE — PROCEDURES
The patient's HeartMate LVAD was interrogated 3/16/2020  * Speed 5500 rpm   * Pulsatility index 3.8  * Power 4.1 Driver   * Flow 4.3 L/minute   Fluid status: euvolemic  Alarms were reviewed, and notable for no events.   The driveline exit site was inspected, dressing cdi.   All external components were inspected and showed no evidence of damage or malfunction, none replaced.   No changes to VAD settings made

## 2020-03-16 NOTE — PROGRESS NOTES
CARDIOTHORACIC SURGERY PROGRESS NOTE  03/16/2020      SUBJECTIVE:    No acute issues over night.   Pain is well controlled. Reports breathing is okay.   Patient denies CP, fever, chills, sweats, lightheadedness, dizziness.   Patient has been tolerating diet. + BM. + flatus.    OBJECTIVE:   Temp:  [97.6  F (36.4  C)-98.8  F (37.1  C)] 98.1  F (36.7  C)  Heart Rate:  [72-91] 72  Resp:  [15-18] 16  BP: ()/(57-86) 95/57  SpO2:  [95 %-100 %] 97 %    Gen: NAD, sitting up in chair  CV: LVAD hum. JVP elevated  Pulm: diminished bases  Abd: soft, non-tender, no guarding, +BS  Ext: trace bilateral lower extremity edema  Incision: clean, dry, intact, no erythema  Chest Tube sites: dressings clean and dry  Neuro: grossly normal  Psych: calm, cooperative     I&O's:  I/O last 3 completed shifts:  In: 1940 [P.O.:1940]  Out: 3005 [Urine:2975; Chest Tube:30]    Labs:   BMP  Recent Labs   Lab 03/16/20  0526 03/15/20  0655 03/14/20  0524 03/13/20  0551    137 134 138   POTASSIUM 4.0 3.9 3.9 4.2   CHLORIDE 103 104 100 103   CALOS 8.4* 8.1* 7.9* 8.1*   CO2 28 29 31 30   BUN 25 22 22 21   CR 1.08 1.03 1.08 0.94   * 108* 115* 120*     CBC  Recent Labs   Lab 03/16/20  0526 03/15/20  2057 03/15/20  1459 03/15/20  0655 03/14/20  0524 03/13/20  0551   WBC 9.9  --   --  9.1 8.9 9.0   RBC 3.05*  --   --  2.92* 2.82* 2.69*   HGB 8.1* 7.6* 8.8* 8.0* 7.6* 7.3*   HCT 28.0*  --   --  27.2* 26.2* 25.3*   MCV 92  --   --  93 93 94   MCH 26.6  --   --  27.4 27.0 27.1   MCHC 28.9*  --   --  29.4* 29.0* 28.9*   RDW 17.3*  --   --  17.1* 17.2* 17.0*     --   --  380 427 431     INR  Recent Labs   Lab 03/16/20  0526 03/15/20  0655 03/13/20  0551 03/12/20  0516   INR 3.15* 2.97* 2.76* 2.33*      Hepatic Panel No lab results found in last 7 days.  Glucose  Recent Labs   Lab 03/16/20  0829 03/16/20  0603 03/16/20  0526 03/16/20  0228 03/15/20  2257 03/15/20  1755 03/15/20  1230 03/15/20  0655  03/14/20  0524  03/13/20  0551   03/12/20  0516  03/11/20  0543   GLC  --   --  114*  --   --   --   --  108*  --  115*  --  120*  --  108*  --  132*   * 117*  --  112* 140* 150* 123*  --    < >  --    < >  --    < >  --    < >  --     < > = values in this interval not displayed.       Imaging:   Recent Results (from the past 24 hour(s))   XR Chest Port 1 View    Narrative    EXAM: XR CHEST PORT 1 VW  3/15/2020 3:18 PM     HISTORY:  s/p chest tube removal       COMPARISON:  Chest x-ray 3/14/2020    FINDINGS:   AP portable chest radiograph was obtained.    The trachea is midline. The cardiomediastinal silhouette is stable  with LVAD and median sternotomy wires in place. Interval removal of  the right-sided chest tubes. Redemonstration of loculated fluid in the  right interlobar fissure. No left-sided effusion. Tiny pleural  separation towards the right apex may represent some residual pleural  fluid decreasing in density compared to prior study 3/6/2020 versus  tiny right pneumothorax. Lung volumes are low.       Impression    IMPRESSION:   1. Interval removal of 2 right-sided chest tubes. Pleural separation  at the right apex similar to multiple prior chest radiographs as  above.    2. Stable right-sided pleural effusion with loculated fluid in the  right interlobar fissure.    I have personally reviewed the examination and initial interpretation  and I agree with the findings.    LUPE MASON MD   XR Chest Port 1 View    Narrative    EXAM: XR CHEST PORT 1   3/16/2020 8:38 AM      HISTORY: s/p chest tube removal    COMPARISON: X-ray: 3/15/2020.    FINDINGS: Single view of the chest. Intact median sternotomy wires.  LVAD is stable in location. No significant pneumothorax. No pleural  effusion. Stable cardiomediastinal silhouette. Unchanged loculated  fluid in the right interlobar fissure. No acute airspace opacity.      Impression    IMPRESSION:    1. No significant pneumothorax.  2. Unchanged loculated fluid in the right interlobar  fissure.    I have personally reviewed the examination and initial interpretation  and I agree with the findings.    AIME GAY MD         ASSESSMENT/PLAN: Patient is a 64 year old male with a history of chronic systolic heart failure, NICM, moderate CAD, HTN, ABHINAV on CPAP, DM2, CKDIII who is transferred to [facility] for evaluation and management of advanced heart failure with arrhythmia now s/p HM 3 on 2/18 with Dr. Jaramillo. 3/4 had hemothorax requiring a chest tube and to OR for VATS washout of right hemothorax on 3/6.      Neuro:   # Postop Pain  - Neuro intact  - Tylenol and oxycodone   # Depression/anxiety  - Fluoxetine 20 mg daily     CV:   # Endstage NICM S/P LVAD HM3  # RV dysfunction  - TTE 02/25 @5600 RPM, septum midline, mod-severe RV dysfunction, aortic valve closed. Current speed 5500  - Digoxin 125 mcg daily for RV support  - MAPs 70s-80s. Lisinopril 10 mg daily, hydralazine 100 mg q8H. Avoid BB with RV dysfunction.   - Hypervolemic, improving. Lasix IV 40 03/13. Lasix PO 40 mg daily  - Aspirin on hold, will review with Dr. Jaramillo now that chest tube removed, bival off  - Warfarin to CFX goal 20-40. Therapeutic. Discontinue CFX, INR therapeutic off bival  - Right pleural chest tubes removed 03/16  # Atrial fibrillation  # VT  - EP planning ICD prior to discharge, plan for today (3/16)  - S/p DCCV 02/28  - Amiodarone 200 mg daily     Pulm:   # Postop mechanical ventilation, resolved  - Extubated POD 2 02/20  - Pulm toilet, IS, activity and deep breathing   - Supplemental O2 PRN to keep sats > 92%.   # ABHINAV  - Home CPAP  # Right Hemothorax  - Chest tube placed 03/04, removed 3/15, f/u CXR unremarkable  - VATS 3/6, extensive clot burden removed      ID:   # Proteus and enterococcus bacteremia  - Organisms growing from 2/2 blood cultures 2/13  - ID consulted, now following peripherally  ampicillin 2 grams q 6 hrs (14 days after removal of all central venous catheters (2/26 - 3/11))   - ID recommends  surveillance cultures every week x 4 weeks after stopping all antibiotics (note on 2/25/20). Repeat cultures for surveillance ordered 3/13, NGTD.   - WBC WNL, afebrile, no signs or symptoms of infection      GI / FEN:  # Nutrition  - Reg diet, bowel regimen     Renal / :   # CKD stage 3  - Creatinine WNL, trend   - Diuresis as above  - Avoid nephroxtoxins   # Gout  - Allopurinol 200 mg daily     Heme:  # Acute postop blood loss anemia  - Hgb and Plts stable  - Transfuse PRN  # ANA  - Completed plasmapheresis preop   - Warfarin as above     Endo:   # DMT2  # Stress hyperglycemia  - Lantus 10 unit(s) at bedtime plus SSI     PPX:   - GI: Protonix  - DVT: warfarin     Dispo:   - 6C since 3/9  - Anticipate DC to TCU per PT/OT pending ICD placement, device check and CXR in am (3/17)    Staff surgeons have been informed of changes through both verbal and written communication.         Mono Gambino PA-C  Cardiothoracic Surgery  p: [phone]

## 2020-03-16 NOTE — PROGRESS NOTES
Care Coordinator Progress Note    Admission Date/Time:  2/6/2020  Attending MD:  Mac Jaramillo  Data  Chart reviewed, discussed with interdisciplinary team.   Patient was admitted for:    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)  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)  Heart failure (H)  Cardiac arrest (H)  Cardiac arrest (H)  Hemothorax, right.  Pt is now s/p LVAD HM3 on 2/18/2020.     Concerns with insurance coverage for discharge needs: None stated by pt.  Current Living Situation: Patient lives with spouse.  Support System: Supportive and Involved wife. Pt's wife told this writer that she will be doing pt's LVAD dressing changes.   Services Involved: none currently.   Transportation at Discharge: TBD  Transportation to Medical Appointments:   - Name of caregiver: wife.   Barriers to Discharge: post-op recovery.     Coordination of Care and Referrals: Provided patient/family with options for outpt INR and Warfarin monitoring.    Assessment  Per NP, pt will need outpt INR and Warfarin monitoring arranged with the [facility]; pt is in agreement with this plan.   OT is currently recommending ARU; pt is in agreement with this plan.   Intervention:   Arrangements made with the [facility] (Ph: [phone] Fax: [phone]) for INR and Warfarin monitoring (Goal=2-3). Indication for Anticoagulation: LVAD. Dr. Gucci Tomas to follow.   Plan  Anticipated Discharge Date:  TBD  Anticipated Discharge Plan:   Social Work following for placement at ARU.    TARIK SNIDER RN BSN  Care Coordinator Unit   [phone]

## 2020-03-16 NOTE — PROGRESS NOTES
[facility]   Cardiology II Service / Advanced Heart Failure  Daily Consult Note      Patient: Eliseo Tanner  MRN: [id number]  Admission Date: 2/6/2020  Hospital Day # 39    Assessment and Plan: Eliseo Tanner is a 64 year old male with chronic systolic heart failure, NICM, moderate CAD, HTN, ABHINAV on CPAP, DM2, CKDIII who is transferred to [facility] for evaluation and management of advanced heart failure with arrhythmia now s/p HM 3 on 2/18.    Recommendations:  -increase lisinopril if MAP>85 consistently  -ICD today  -start process of ARU discharge    # Chronic systolic heart failure secondary to NICCM   # Severe mitral regurgitation  # s/p Heart Mate III LVAD   Stage D. NYHA Class IIIB. TTE 1/9/2020: LVEF 15-20%; LVEDd 5.9 cm; 4+ MR. Post op course c/b bleeding.     Fluid status: euvolemic on lasix 40 mg daily  RV support: digoxin 125 mcg daily, last level 0.8  ACEi/ARB: lisinopril 10 mg daily  Afterload reduction: hydralazine 100 mg tid  BB: defer due to recent LVAD  Aldosterone antagonist: deferred while other medical therapy is prioritized  SCD prophylaxis: ICD implant today  MAP:   LDH trends: 294 2/18  Anticoagulation: warfarin per pharmacy, following CFx pending today, bival off 3/14  Antiplatelet: ASA 81 mg     #CAD  Cor angio 11/2019 w/ nonobstructive CAD w/ severe branch disease.  -ASA 81 mg  -atorvastatin 20 mg    #New atrial fibrillation s/p DCCV  ECG obtained difficult to interpret given LVAD artifact, but afib w/ RVR and aberrancy vs. MCFP vs. AT vs. Slow VT. S/p DCCV on 2/28 back into sinus rhythm.  -warfarin as above  -no BB due to recent LVAD, on digoxin     #Retrosternal hematoma  CT chest 2/26: measuring 5.1 cm. S/P chest tube per CVTS 3/5, d/c'ed 3/15. Hgb improving.  -monitor with resuming ASA  -monitor symptoms and CBC     #Proteus and Enterococcus bacteremia, resolved   Blood cx 2/13 positive 2/2. Blood cultures 2/15 +1/2 S.epi, likely contaminant per ID. Zosyn  "(2/13-2/14) Tobramycin (2/13-2/14) Vancomycin (2/13-2/17) Meropenem (2/14-2/15) Ceftriaxone (2/16-2/28) -Ampicillin (2/16-3/11) (14 days after swan removal on 2/26).  -blood cx negative since 2/15, ordered [id number] 3/15 ngtd     #Thrombocytopenia, resolved  HIT positive DAVINA negative. Antibody negative x2, thus no further indication for PLEX. Heme consulted.  -s/p Bivalirudin gtt, discontinued 3/14goal chromogenic level 20-40,      #VF s/p external shock  Shocked x 3 prior to transfer, loaded with amio. No known prior history.   -keep K>4, Mg>2  -amio 200 mg PO daily  -ICD today     #Atrial flutter   S/p cardioversion 2/28/20   - warfarin and Bival as above      Siena Aguiar, DNP, NP-C  Advanced Heart Failure/Cardiology II Service  Pager [phone] [facility] [id number]    ================================================================    Subjective/24-Hr Events:   Last 24 hr care team notes reviewed. No overnight events. No new complaints. Denies bleeding issues. No orthopnea, PND, LE edema.     ROS:  4 point ROS including respiratory, CV, GI and  (other than that noted in the HPI) is negative.     Medications: Reviewed in EPIC.     Physical Exam:   BP 95/57 (BP Location: Left arm)   Pulse 82   Temp 98.1  F (36.7  C) (Oral)   Resp 16   Ht 1.702 m (5' 7\")   Wt 90.7 kg (199 lb 14.4 oz)   SpO2 97%   BMI 31.31 kg/m      GENERAL: Appears comfortable, in no distress.  HEENT: Eye symmetrical, no discharge or icterus bilaterally. Mucous membranes moist and without lesions.  NECK: Supple, JVD just above clavicle at 75 degrees.   CV: +mechanical LVAD hum  RESPIRATORY: Respirations regular, even, and unlabored. Lungs CTA throughout.   GI: Soft, obese and non distended with normoactive bowel sounds present in all quadrants. No tenderness, rebound, guarding.   EXTREMITIES: Trace peripheral edema. Non pulsatile.   NEUROLOGIC: Alert and oriented x 3. No focal deficits.   MUSCULOSKELETAL: No joint swelling or tenderness.   SKIN: No " jaundice. No rashes or lesions. Sternum stable.     Labs:  CMP  Recent Labs   Lab 03/16/20  0526 03/15/20  0655 03/14/20  0524 03/13/20  0551    137 134 138   POTASSIUM 4.0 3.9 3.9 4.2   CHLORIDE 103 104 100 103   CO2 28 29 31 30   ANIONGAP 5 4 3 5   * 108* 115* 120*   BUN 25 22 22 21   CR 1.08 1.03 1.08 0.94   GFRESTIMATED 71 75 71 84   GFRESTBLACK 82 87 82 >90   CALOS 8.4* 8.1* 7.9* 8.1*   MAG 1.9 2.0 2.1 1.9       CBC  Recent Labs   Lab 03/16/20  0526 03/15/20  2057 03/15/20  1459 03/15/20  0655 03/14/20  0524 03/13/20  0551   WBC 9.9  --   --  9.1 8.9 9.0   RBC 3.05*  --   --  2.92* 2.82* 2.69*   HGB 8.1* 7.6* 8.8* 8.0* 7.6* 7.3*   HCT 28.0*  --   --  27.2* 26.2* 25.3*   MCV 92  --   --  93 93 94   MCH 26.6  --   --  27.4 27.0 27.1   MCHC 28.9*  --   --  29.4* 29.0* 28.9*   RDW 17.3*  --   --  17.1* 17.2* 17.0*     --   --  380 427 431       INR  Recent Labs   Lab 03/16/20  0526 03/15/20  0655 03/13/20  0551 03/12/20  0516   INR 3.15* 2.97* 2.76* 2.33*       Time/Communication  I personally spent a total of 25 minutes. Of that 15 minutes was counseling/coordination of patient's care. Plan of care discussed with patient. See my note above for details.    Patient discussed with Dr. Cisneros.

## 2020-03-16 NOTE — PROGRESS NOTES
"CVTS Daily Note  3/15/2020  Surgeon: Clint    Subjective/interval:   Breathing well. Working with therapies. Pain controlled. Minimal chest tube output    Objective:   Vital signs:  Temp: 98.7  F (37.1  C) Temp src: Oral BP: 97/85 Pulse: 82 Heart Rate: 83 Resp: 18 SpO2: 97 % O2 Device: None (Room air) Oxygen Delivery: 2 LPM Height: (170 cm entered into optia) Weight: 90.7 kg (199 lb 14.4 oz)  Estimated body mass index is 31.31 kg/m  as calculated from the following:    Height as of this encounter: 1.702 m (5' 7\").    Weight as of this encounter: 90.7 kg (199 lb 14.4 oz).      Intake/Output Summary (Last 24 hours) at 3/13/2020 0912  Last data filed at 3/13/2020 0800  Gross per 24 hour   Intake 1082.8 ml   Output 1105 ml   Net -22.2 ml         Gen: NAD, sitting up in chair  CV: LVAD hum. JVP elevated  Pulm: diminished bases  Abd: soft, non-tender, no guarding, +BS  Ext: trace bilateral lower extremity edema  Incision: clean, dry, intact, no erythema  Chest Tube sites: dressings clean and dry, serosanguinous, no air leak, output 160 ml  Neuro: grossly normal  Psych: calm, cooperative       Labs:  BMP  Recent Labs   Lab 03/15/20  0655 03/14/20  0524 03/13/20  0551 03/12/20  0516    134 138 137   POTASSIUM 3.9 3.9 4.2 4.0   CHLORIDE 104 100 103 100   CALOS 8.1* 7.9* 8.1* 8.0*   CO2 29 31 30 30   BUN 22 22 21 19   CR 1.03 1.08 0.94 0.95   * 115* 120* 108*     CBC  Recent Labs   Lab 03/15/20  2057 03/15/20  1459 03/15/20  0655 03/14/20  0524 03/13/20  0551 03/12/20  0516   WBC  --   --  9.1 8.9 9.0 9.3   RBC  --   --  2.92* 2.82* 2.69* 2.80*   HGB 7.6* 8.8* 8.0* 7.6* 7.3* 7.6*   HCT  --   --  27.2* 26.2* 25.3* 25.9*   MCV  --   --  93 93 94 93   MCH  --   --  27.4 27.0 27.1 27.1   MCHC  --   --  29.4* 29.0* 28.9* 29.3*   RDW  --   --  17.1* 17.2* 17.0* 17.0*   PLT  --   --  380 427 431 435     INR  Recent Labs   Lab 03/15/20  0655 03/13/20  0551 03/12/20  0516 03/11/20  0543   INR 2.97* 2.76* 2.33* 2.29*    "   Hepatic Panel   Lab Results   Component Value Date    AST 20 03/04/2020     Lab Results   Component Value Date    ALT 21 03/04/2020     Lab Results   Component Value Date    ALBUMIN 1.8 03/04/2020     GLUCOSE:   Recent Labs   Lab 03/15/20  1755 03/15/20  1230 03/15/20  0655 03/15/20  0327 03/14/20  2147 03/14/20  1734 03/14/20  1148  03/14/20  0524  03/13/20  0551  03/12/20  0516  03/11/20  0543  03/10/20  0538   GLC  --   --  108*  --   --   --   --   --  115*  --  120*  --  108*  --  132*  --  117*   * 123*  --  112* 134* 152* 127*   < >  --    < >  --    < >  --    < >  --    < >  --     < > = values in this interval not displayed.       Imaging:  reviewed recent imaging      Assessment/Plan:     Eliseo Tanner is a 64 year old male with a PMH of chronic systolic heart failure, NICM, moderate CAD, HTN, ABHINAV on CPAP, DM2, CKDIII who is transferred to [facility] for evaluation and management of advanced heart failure with arrhythmia now s/p HM 3 on 2/18 with Dr. Jaramillo. 3/4 had hemothorax requiring a chest tube and to OR for VATS washout of right hemothorax on 3/6.     Neuro:   # Postop Pain  - Neuro intact  - Tylenol and oxycodone   # Depression/anxiety  - Fluoxetine 20 mg daily    CV:   # Endstage NICM S/P LVAD HM3  # RV dysfunction  - TTE 02/25 @5600 RPM, septum midline, mod-severe RV dysfunction, aortic valve closed. Current speed 5500  - Digoxin 125 mcg daily for RV support  - MAPs 70s-80s. Lisinopril 10 mg daily, hydralazine 100 mg q8H. Avoid BB with RV dysfunction  - Hypervolemic, improving. Lasix IV 40 03/113. Lasix PO 40 mg daily  - Aspirin on hold, will review with Dr. Jaramillo now that chest tube removed, bival off  - Warfarin to CFX goal 20-40. Therapeutic. Trend INR/CFX until they correlate, then INR to goal 2-3  - Right pleural chest tubes removed 03/16  # Atrial fibrillation  # VT  - EP planning ICD prior to discharge, likely Monday if no further bleeding  - S/p DCCV 02/28  - Amiodarone 200  mg daily    Pulm:   # Postop mechanical ventilation, resolved  - Extubated POD 2 02/20  - Pulm toilet, IS, activity and deep breathing   - Supplemental O2 PRN to keep sats > 92%.   # ABHINAV  - Home CPAP  # Right Hemothorax  - Chest tube placed 03/04  - VATS 3/6, extensive clot burden removed     ID:   # Proteus and enterococcus bacteremia  - Organisms growing from 2/2 blood cultures 2/13  - ID consulted, now following peripherally  ampicillin 2 grams q 6 hrs (14 days after removal of all central venous catheters (2/26 - 3/11))   - ID recommends surveillance cultures every week x 4 weeks after stopping all antibiotics (note on 2/25/20). Repeat cultures for surveillance ordered 3/13, NGTD.    - WBC WNL, afebrile, no signs or symptoms of infection     GI / FEN:  # Nutrition  - Reg diet, bowel regimen    Renal / :   # CKD stage 3  - Creatinine WNL, trend   - Diuresis as above  - Avoid nephroxtoxins   # Gout  - Allopurinol 200 mg daily    Heme:  # Acute postop blood loss anemia  - Hgb 7.3, Plts 431  - Transfuse PRN  # ANA  - Completed plasmapheresis preop   - Warfarin as above    Endo:   # DMT2  # Stress hyperglycemia  - Lantus 10 unit(s) at bedtime plus SSI    PPX:   - GI: Protonix  - DVT: warfarin    Dispo:   - 6C since 3/9  - Anticipate DC to TCU per PT/OT pending chest tube removal and ICD placement      Discussed with CVTS Fellow   Staff surgeons have been informed of changes through both  verbal and written communication.      Laura Drake PA-C  Cardiothoracic Surgery  Pager [phone]

## 2020-03-17 ENCOUNTER — HOSPITAL ENCOUNTER (INPATIENT)
Facility: CLINIC | Age: 65
End: 2020-03-17
Payer: MEDICARE

## 2020-03-17 ENCOUNTER — APPOINTMENT (OUTPATIENT)
Dept: PHYSICAL THERAPY | Facility: CLINIC | Age: 65
DRG: 001 | End: 2020-03-17
Attending: INTERNAL MEDICINE
Payer: MEDICARE

## 2020-03-17 ENCOUNTER — APPOINTMENT (OUTPATIENT)
Dept: GENERAL RADIOLOGY | Facility: CLINIC | Age: 65
DRG: 001 | End: 2020-03-17
Attending: PHYSICIAN ASSISTANT
Payer: MEDICARE

## 2020-03-17 ENCOUNTER — ANCILLARY PROCEDURE (OUTPATIENT)
Dept: CARDIOLOGY | Facility: CLINIC | Age: 65
DRG: 001 | End: 2020-03-17
Attending: STUDENT IN AN ORGANIZED HEALTH CARE EDUCATION/TRAINING PROGRAM
Payer: MEDICARE

## 2020-03-17 ENCOUNTER — APPOINTMENT (OUTPATIENT)
Dept: OCCUPATIONAL THERAPY | Facility: CLINIC | Age: 65
DRG: 001 | End: 2020-03-17
Attending: INTERNAL MEDICINE
Payer: MEDICARE

## 2020-03-17 LAB
ANION GAP SERPL CALCULATED.3IONS-SCNC: 6 MMOL/L (ref 3–14)
APTT PPP: 68 SEC (ref 22–37)
BUN SERPL-MCNC: 25 MG/DL (ref 7–30)
CALCIUM SERPL-MCNC: 8.1 MG/DL (ref 8.5–10.1)
CHLORIDE SERPL-SCNC: 103 MMOL/L (ref 94–109)
CO2 SERPL-SCNC: 29 MMOL/L (ref 20–32)
CREAT SERPL-MCNC: 1.04 MG/DL (ref 0.66–1.25)
ERYTHROCYTE [DISTWIDTH] IN BLOOD BY AUTOMATED COUNT: 17.4 % (ref 10–15)
GFR SERPL CREATININE-BSD FRML MDRD: 74 ML/MIN/{1.73_M2}
GLUCOSE BLDC GLUCOMTR-MCNC: 122 MG/DL (ref 70–99)
GLUCOSE BLDC GLUCOMTR-MCNC: 124 MG/DL (ref 70–99)
GLUCOSE BLDC GLUCOMTR-MCNC: 154 MG/DL (ref 70–99)
GLUCOSE BLDC GLUCOMTR-MCNC: 157 MG/DL (ref 70–99)
GLUCOSE BLDC GLUCOMTR-MCNC: 178 MG/DL (ref 70–99)
GLUCOSE SERPL-MCNC: 120 MG/DL (ref 70–99)
HCT VFR BLD AUTO: 28.9 % (ref 40–53)
HGB BLD-MCNC: 8.2 G/DL (ref 13.3–17.7)
INR PPP: 3.54 (ref 0.86–1.14)
INTERPRETATION ECG - MUSE: NORMAL
MAGNESIUM SERPL-MCNC: 2.2 MG/DL (ref 1.6–2.3)
MCH RBC QN AUTO: 26.3 PG (ref 26.5–33)
MCHC RBC AUTO-ENTMCNC: 28.4 G/DL (ref 31.5–36.5)
MCV RBC AUTO: 93 FL (ref 78–100)
PLATELET # BLD AUTO: 466 10E9/L (ref 150–450)
POTASSIUM SERPL-SCNC: 4.2 MMOL/L (ref 3.4–5.3)
RBC # BLD AUTO: 3.12 10E12/L (ref 4.4–5.9)
SODIUM SERPL-SCNC: 138 MMOL/L (ref 133–144)
WBC # BLD AUTO: 10.2 10E9/L (ref 4–11)

## 2020-03-17 PROCEDURE — 25000132 ZZH RX MED GY IP 250 OP 250 PS 637: Mod: GY | Performed by: PHYSICIAN ASSISTANT

## 2020-03-17 PROCEDURE — 85027 COMPLETE CBC AUTOMATED: CPT | Performed by: THORACIC SURGERY (CARDIOTHORACIC VASCULAR SURGERY)

## 2020-03-17 PROCEDURE — 00000146 ZZHCL STATISTIC GLUCOSE BY METER IP

## 2020-03-17 PROCEDURE — 97535 SELF CARE MNGMENT TRAINING: CPT | Mod: GO | Performed by: OCCUPATIONAL THERAPIST

## 2020-03-17 PROCEDURE — 21400000 ZZH R&B CCU UMMC

## 2020-03-17 PROCEDURE — 25000131 ZZH RX MED GY IP 250 OP 636 PS 637: Mod: GY | Performed by: PHYSICIAN ASSISTANT

## 2020-03-17 PROCEDURE — 25000132 ZZH RX MED GY IP 250 OP 250 PS 637: Mod: GY | Performed by: STUDENT IN AN ORGANIZED HEALTH CARE EDUCATION/TRAINING PROGRAM

## 2020-03-17 PROCEDURE — 97530 THERAPEUTIC ACTIVITIES: CPT | Mod: GP

## 2020-03-17 PROCEDURE — 97110 THERAPEUTIC EXERCISES: CPT | Mod: GO | Performed by: OCCUPATIONAL THERAPIST

## 2020-03-17 PROCEDURE — 83735 ASSAY OF MAGNESIUM: CPT | Performed by: THORACIC SURGERY (CARDIOTHORACIC VASCULAR SURGERY)

## 2020-03-17 PROCEDURE — 93282 PRGRMG EVAL IMPLANTABLE DFB: CPT

## 2020-03-17 PROCEDURE — 25000132 ZZH RX MED GY IP 250 OP 250 PS 637: Mod: GY | Performed by: NURSE PRACTITIONER

## 2020-03-17 PROCEDURE — 99233 SBSQ HOSP IP/OBS HIGH 50: CPT | Mod: 25 | Performed by: INTERNAL MEDICINE

## 2020-03-17 PROCEDURE — 93282 PRGRMG EVAL IMPLANTABLE DFB: CPT | Mod: 26 | Performed by: INTERNAL MEDICINE

## 2020-03-17 PROCEDURE — 25000132 ZZH RX MED GY IP 250 OP 250 PS 637: Mod: GY

## 2020-03-17 PROCEDURE — 80048 BASIC METABOLIC PNL TOTAL CA: CPT | Performed by: THORACIC SURGERY (CARDIOTHORACIC VASCULAR SURGERY)

## 2020-03-17 PROCEDURE — 93750 INTERROGATION VAD IN PERSON: CPT | Performed by: INTERNAL MEDICINE

## 2020-03-17 PROCEDURE — 85730 THROMBOPLASTIN TIME PARTIAL: CPT | Performed by: THORACIC SURGERY (CARDIOTHORACIC VASCULAR SURGERY)

## 2020-03-17 PROCEDURE — 25000128 H RX IP 250 OP 636: Performed by: STUDENT IN AN ORGANIZED HEALTH CARE EDUCATION/TRAINING PROGRAM

## 2020-03-17 PROCEDURE — 36415 COLL VENOUS BLD VENIPUNCTURE: CPT | Performed by: THORACIC SURGERY (CARDIOTHORACIC VASCULAR SURGERY)

## 2020-03-17 PROCEDURE — 85610 PROTHROMBIN TIME: CPT | Performed by: THORACIC SURGERY (CARDIOTHORACIC VASCULAR SURGERY)

## 2020-03-17 PROCEDURE — 97116 GAIT TRAINING THERAPY: CPT | Mod: GP

## 2020-03-17 PROCEDURE — 40000986 XR CHEST 2 VW

## 2020-03-17 RX ORDER — WARFARIN SODIUM 2.5 MG/1
2.5 TABLET ORAL
Status: COMPLETED | OUTPATIENT
Start: 2020-03-17 | End: 2020-03-17

## 2020-03-17 RX ADMIN — INSULIN ASPART 2 UNITS: 100 INJECTION, SOLUTION INTRAVENOUS; SUBCUTANEOUS at 17:49

## 2020-03-17 RX ADMIN — FUROSEMIDE 40 MG: 40 TABLET ORAL at 07:55

## 2020-03-17 RX ADMIN — HYDRALAZINE HYDROCHLORIDE 100 MG: 100 TABLET, FILM COATED ORAL at 14:06

## 2020-03-17 RX ADMIN — HYDRALAZINE HYDROCHLORIDE 100 MG: 100 TABLET, FILM COATED ORAL at 05:54

## 2020-03-17 RX ADMIN — CEFAZOLIN SODIUM 2 G: 2 INJECTION, SOLUTION INTRAVENOUS at 05:54

## 2020-03-17 RX ADMIN — INSULIN ASPART 1 UNITS: 100 INJECTION, SOLUTION INTRAVENOUS; SUBCUTANEOUS at 11:46

## 2020-03-17 RX ADMIN — MULTIPLE VITAMINS W/ MINERALS TAB 1 TABLET: TAB at 07:54

## 2020-03-17 RX ADMIN — MAGNESIUM OXIDE 400 MG: 400 TABLET ORAL at 07:54

## 2020-03-17 RX ADMIN — PANTOPRAZOLE SODIUM 40 MG: 40 TABLET, DELAYED RELEASE ORAL at 07:55

## 2020-03-17 RX ADMIN — WARFARIN SODIUM 2.5 MG: 2.5 TABLET ORAL at 17:49

## 2020-03-17 RX ADMIN — CEFAZOLIN SODIUM 2 G: 2 INJECTION, SOLUTION INTRAVENOUS at 13:44

## 2020-03-17 RX ADMIN — DIGOXIN 125 MCG: 125 TABLET ORAL at 07:54

## 2020-03-17 RX ADMIN — HYDRALAZINE HYDROCHLORIDE 100 MG: 100 TABLET, FILM COATED ORAL at 22:01

## 2020-03-17 RX ADMIN — ALLOPURINOL 200 MG: 100 TABLET ORAL at 07:54

## 2020-03-17 RX ADMIN — LISINOPRIL 10 MG: 10 TABLET ORAL at 07:54

## 2020-03-17 RX ADMIN — ATORVASTATIN CALCIUM 20 MG: 20 TABLET, FILM COATED ORAL at 19:59

## 2020-03-17 RX ADMIN — CEPHALEXIN 250 MG: 250 CAPSULE ORAL at 22:01

## 2020-03-17 RX ADMIN — AMIODARONE HYDROCHLORIDE 200 MG: 200 TABLET ORAL at 07:55

## 2020-03-17 RX ADMIN — POTASSIUM CHLORIDE 20 MEQ: 750 TABLET, EXTENDED RELEASE ORAL at 07:55

## 2020-03-17 RX ADMIN — ACETAMINOPHEN 650 MG: 325 TABLET, FILM COATED ORAL at 14:06

## 2020-03-17 RX ADMIN — INSULIN ASPART 1 UNITS: 100 INJECTION, SOLUTION INTRAVENOUS; SUBCUTANEOUS at 22:01

## 2020-03-17 RX ADMIN — Medication 200 MG: at 07:54

## 2020-03-17 RX ADMIN — ASPIRIN 81 MG CHEWABLE TABLET 81 MG: 81 TABLET CHEWABLE at 07:54

## 2020-03-17 RX ADMIN — FLUOXETINE 20 MG: 20 CAPSULE ORAL at 07:54

## 2020-03-17 ASSESSMENT — ACTIVITIES OF DAILY LIVING (ADL)
ADLS_ACUITY_SCORE: 16

## 2020-03-17 ASSESSMENT — MIFFLIN-ST. JEOR: SCORE: 1628.59

## 2020-03-17 ASSESSMENT — PAIN DESCRIPTION - DESCRIPTORS: DESCRIPTORS: ACHING

## 2020-03-17 NOTE — PLAN OF CARE
OT/CR 6C: Discharge Planner OT   Patient plan for discharge: ARU  Current status: Pt with good participation and motivation for therapy.  Min A for sit to stand transfers and LVAD management 2/2 weakness.  SBA-CGA with FWW ambulating short distances.  Able to tolerate 15 minutes on Nustep with VSS.  Barriers to return to prior living situation: weakness, deconditioning, post surgical precautions, acute medical needs  Recommendations for discharge: ARU  Rationale for recommendations: Pt is currently below baseline with regards to mobility and independence with self cares and will benefit from continued skilled therapy intervention to address deficits. Pt is motivated and would tolerate 3 hrs therapy/day.         Entered by: Brenda Mandel 03/17/2020 2:12 PM

## 2020-03-17 NOTE — PLAN OF CARE
D: Pt admitted for advanced heart failure therapies, now s/p HM3 2/18 c/b hemothorax s/p washout 3/6 and ICD placement 3/16. Hx of chronic systolic heart failure, NICM, moderate CAD, HTN, ABHINAV on CPAP, DM2 and CKD3.    I/A: vital signs stable, afebrile, A&Ox4, rate-controlled aflutter. LVAD numbers WDL without alarms, driveline dressing intact. ICD site covered, swelling noted, pressure dressing in place. Ice applied over ICD site with improvement in swelling. PRN robaxin given for pain. Post op IV antibiotics given without issue. Slept between cares on home CPAP. Voiding adequate amounts into urinal.     P: Device check this AM. Discharge to TCU when medically ready. Continue to monitor and notify MD with pertinent changes.

## 2020-03-17 NOTE — PROGRESS NOTES
CARDIOTHORACIC SURGERY PROGRESS NOTE  03/17/2020      SUBJECTIVE:    No acute issues over night.   Pain is well controlled. Does report some pain and swelling in around ICD. Reports breathing is okay, no SOB.   Patient denies CP, fever, chills, sweats, lightheadedness, dizziness.   Patient has been tolerating diet. + BM. + flatus.    OBJECTIVE:   Temp:  [98  F (36.7  C)-98.3  F (36.8  C)] 98.3  F (36.8  C)  Heart Rate:  [69-92] 81  Resp:  [16-18] 18  BP: ()/(57-92) 97/81  SpO2:  [93 %-97 %] 96 %    Gen: NAD, sitting up in chair  CV: LVAD hum present  Pulm: diminished bases  Abd: soft, non-tender, no guarding, +BS  Ext: trace bilateral lower extremity edema  Incision: clean, dry, intact, no erythema. Dressing c/d to left ICD site, minimal swelling, no hematoma  Chest Tube sites: dressings clean and dry  Neuro: grossly normal  Psych: calm, cooperative     I&O's:  I/O last 3 completed shifts:  In: 1020 [P.O.:720; I.V.:300]  Out: 3200 [Urine:3200]    Labs:   BMP  Recent Labs   Lab 03/17/20  0508 03/16/20  0526 03/15/20  0655 03/14/20  0524    136 137 134   POTASSIUM 4.2 4.0 3.9 3.9   CHLORIDE 103 103 104 100   CALOS 8.1* 8.4* 8.1* 7.9*   CO2 29 28 29 31   BUN 25 25 22 22   CR 1.04 1.08 1.03 1.08   * 114* 108* 115*     CBC  Recent Labs   Lab 03/17/20  0508 03/16/20  0526 03/15/20  2057 03/15/20  1459 03/15/20  0655 03/14/20  0524   WBC 10.2 9.9  --   --  9.1 8.9   RBC 3.12* 3.05*  --   --  2.92* 2.82*   HGB 8.2* 8.1* 7.6* 8.8* 8.0* 7.6*   HCT 28.9* 28.0*  --   --  27.2* 26.2*   MCV 93 92  --   --  93 93   MCH 26.3* 26.6  --   --  27.4 27.0   MCHC 28.4* 28.9*  --   --  29.4* 29.0*   RDW 17.4* 17.3*  --   --  17.1* 17.2*   * 379  --   --  380 427     INR  Recent Labs   Lab 03/17/20  0508 03/16/20  0526 03/15/20  0655 03/13/20  0551   INR 3.54* 3.15* 2.97* 2.76*      Hepatic Panel No lab results found in last 7 days.  Glucose  Recent Labs   Lab 03/17/20  0508 03/17/20  0235 03/16/20  2101  03/16/20  1715 03/16/20  1300 03/16/20  0829 03/16/20  0603 03/16/20  0526  03/15/20  0655  03/14/20  0524  03/13/20  0551  03/12/20  0516   *  --   --   --   --   --   --  114*  --  108*  --  115*  --  120*  --  108*   BGM  --  122* 154* 133* 113* 105* 117*  --    < >  --    < >  --    < >  --    < >  --     < > = values in this interval not displayed.       Imaging:   Recent Results (from the past 24 hour(s))   XR Chest Port 1 View    Narrative    EXAM: XR CHEST PORT 1 VW  3/16/2020 8:38 AM      HISTORY: s/p chest tube removal    COMPARISON: X-ray: 3/15/2020.    FINDINGS: Single view of the chest. Intact median sternotomy wires.  LVAD is stable in location. No significant pneumothorax. No pleural  effusion. Stable cardiomediastinal silhouette. Unchanged loculated  fluid in the right interlobar fissure. No acute airspace opacity.      Impression    IMPRESSION:    1. No significant pneumothorax.  2. Unchanged loculated fluid in the right interlobar fissure.    I have personally reviewed the examination and initial interpretation  and I agree with the findings.    AIME GAY MD   EP Device    Narrative    EP PROCEDURE NOTE    Procedures:  1. Single chamber ICD device implantation.  2. Left subclavian venogram.        Attending: Dali Day MD  EP Fellow: Michael Sands MD  Procedure Date: 3/16/2020    Pre-operative Diagnosis:  NICM, EF=15%, NYHA class IV, ventricular   fibrillation.  Post-operative diagnosis:   Successful implantation of ICD device,   secondary prevention of SCD.  Complications: None.     Fluoroscopy time/dose:See procedure log.      Clinical Profile:  Mr Eliseo Tanner is a 63yo M w/PMHx notable for chronic systolic heart   failure, NICM, VF arrest, who underwent a LVAD implant recently, presents   today for an ICD implantation.  Rhythm is atrial fibrillation. He is on warfarin with INR today of 3.    PROCEDURE  The risks and benefits of the procedure were explained to the patient  in   full.  The risks of the procedure include, but are not limited to: pain,   bleeding, blood transfusion reactions, infection, pneumothorax,   perforation of vessels or heart, pericardial effusion, and death. Informed   Consent was obtained. The patient was brought to the EP lab in a fasting   and hemodynamically stable condition. The patient was prepped and draped   in a sterile fashion.     Sedation: This procedure was performed under moderate sedation under the   supervision of the the Staff Physician. The patient was assessed   immediately prior to administering sedation.  The patient received 4 mg   Versed and 200 mcg Fentanyl for a total procedural sedation time of 80   minutes. Heart rate, blood pressure, oxygen saturation, and patient   responses were monitored throughout the procedure with the assistance of   the RN.     The left chest wall was vigorously washed, prepped, and sterilely draped.   The chest wall was anesthetized with 2% lidocaine. A subclavian venogram   was performed.    A 5 cm incision was made approximately 2 fingerbreadths from the clavicle.   The subcutaneous tissue was carefully dissected down to the pectoral   fascia. The dissection was carried inferiorly to form a subcutaneous   pocket with adequate hemostasis provided by electrocautery. The subclavian   vein was accessed via the pocket and over the first rib under fluoroscopic   guidance, and one guidewire was inserted down into the level of the IVC.    A peel away sheath was inserted through the guidewire. The guidewire and   dilator was removed. A right ventricular lead was inserted through the   sheath down to the RV apical septum and was screwed into the myocardium.   There was very good sensing and pacing threshold at this position. Ten   volt Pacing was performed without diaphragmatic stimulation. The sheath   was then peeled away, and the sleeve adapter was advanced over the lead.   The lead was then secured to the muscle  using 0-Ethibond suture.     The pocket was then vigorously washed with antibiotic solution. The right   ventricular leads were inserted into the pulse generator. The pulse   generator and the lead were inserted into the subcutaneous pocket and   secured with a 0-Ethibond suture. A Tyrex pouch was used.    The pocket was then closed in 3 layers using 2-0 Vicryl for the deep   layers and 4-0 Vicryl for the subcuticular layer. Steri-Strips and a   dressing were then applied over the incision site, and the patient was   transported to a monitored bed.    Dr. Day was present throughout the entire procedure.    EQUIPMENT  1.The Pulse Generator is a  [id number], Serial [id number].   2.The RV Lead is a [id number], Serial [id number].    MEASUREMENTS  The RV is sensing R waves of 16.1 mV and a pacing capture threshold of   0.75 V @ 0.4 msec with an impedance of 475 ohms.     FINAL PROGRAMMING  Glenn Settings:  -Pacing mode: VVI.  -Lower Rate Limit: 40 ppm.      Tachy Settings:  -VT Zone: set at 176 bpm, Monitor only.  -VF zone: set at 222 bpm, Therapy: 35J x 6.    PLAN  1. Wound care.  2. Antibiotics: Cephalexin 500 mg TID for 5 days.    3. CXR and Device interrogation in a.m.      Michael Sands MD  EP Fellow   X-ray Chest 1  port    Narrative    XR CHEST PORT 1   3/16/2020 5:05 PM      HISTORY: Status post pacer/ICD    COMPARISON: 3/16/2020    FINDINGS:   Portable supine radiograph of the chest. New implanted left chest wall  implanted cardiac device with a single lead in the right ventricle.  Stable postoperative changes of the chest with median sternotomy wires  and LVAD device unchanged. Trachea is midline. The cardiac silhouette  is unchanged. Stable loculated fluid in the right interlobar fissure.  Trace right apical pneumothorax. No large pleural effusion. Hazy  bibasilar opacities.      Impression    IMPRESSION:   1. New left chest wall implanted cardiac device with single lead in  the right ventricle.  2. Trace  right apical pneumothorax.  3. Hazy bibasilar opacities, representing atelectasis versus  infection.    Imaging findings were discussed by telephone with Dr. Ferguson at  5:22PM on 3/16/2020    I have personally reviewed the examination and initial interpretation  and I agree with the findings.    VANESA FORTE MD         ASSESSMENT/PLAN: Patient is a 64 year old male with a history of chronic systolic heart failure, NICM, moderate CAD, HTN, ABHINAV on CPAP, DM2, CKDIII who is transferred to [facility] for evaluation and management of advanced heart failure with arrhythmia now s/p HM 3 on 2/18 with Dr. Jaramillo. 3/4 had hemothorax requiring a chest tube and to OR for VATS washout of right hemothorax on 3/6.      Neuro:   # Postop Pain  - Neuro intact  - Tylenol and oxycodone   # Depression/anxiety  - Fluoxetine 20 mg daily     CV:   # Endstage NICM S/P LVAD HM3  # RV dysfunction  - TTE 02/25 @5600 RPM, septum midline, mod-severe RV dysfunction, aortic valve closed. Current speed 5500  - Digoxin 125 mcg daily for RV support  - MAPs 70s-80s. Lisinopril 10 mg daily, hydralazine 100 mg q8H. Avoid BB with RV dysfunction.   - Hypervolemic, improving. Lasix IV 40 03/13. Lasix PO 40 mg daily  - Aspirin on hold, will review with Dr. Jaramillo now that chest tube removed, bival off  - Warfarin to CFX goal 20-40. Therapeutic. Discontinue CFX, INR therapeutic off bival  - Right pleural chest tubes removed 03/16  # Atrial fibrillation  # VT  - EP placed ICD 3/17, plan for CXR/ICD check this am  - S/p DCCV 02/28  - Amiodarone 200 mg daily     Pulm:   # Postop mechanical ventilation, resolved  - Extubated POD 2 02/20  - Pulm toilet, IS, activity and deep breathing   - Supplemental O2 PRN to keep sats > 92%.   # ABHINAV  - Home CPAP  # Right Hemothorax  - Chest tube placed 03/04, removed 3/15, f/u CXR unremarkable  - VATS 3/6, extensive clot burden removed      ID:   # Proteus and enterococcus bacteremia  - Organisms growing from 2/2 blood  cultures 2/13  - ID consulted, now following peripherally  ampicillin 2 grams q 6 hrs (14 days after removal of all central venous catheters (2/26 - 3/11))   - ID recommends surveillance cultures every week x 4 weeks after stopping all antibiotics (note on 2/25/20). Repeat cultures for surveillance ordered 3/13, NGTD.   - WBC WNL, afebrile, no signs or symptoms of infection      GI / FEN:  # Nutrition  - Reg diet, bowel regimen     Renal / :   # CKD stage 3  - Creatinine WNL, trend   - Diuresis as above  - Avoid nephroxtoxins   # Gout  - Allopurinol 200 mg daily     Heme:  # Acute postop blood loss anemia  - Hgb and Plts stable  - Transfuse PRN  # ANA  - Completed plasmapheresis preop   - Warfarin as above     Endo:   # DMT2  # Stress hyperglycemia  - Lantus 10 unit(s) at bedtime plus SSI     PPX:   - GI: Protonix  - DVT: warfarin     Dispo:   - 6C since 3/9  - Anticipate DC to TCU per PT/OT pending ICD placement, medically ready today pending device check and CXR    Staff surgeons have been informed of changes through both verbal and written communication.         Mono Gambino PA-C  Cardiothoracic Surgery  p: [phone]

## 2020-03-17 NOTE — PROGRESS NOTES
LVAD Social Work Services Progress Note      Date of Initial Social Work Evaluation: 2/10/2020  Collaborated with: Pt, pt's wife (via phone, [facility] and CVTS     Data: Pt is s/p HM3 LVAD implant on 2/18 and ICD implant yesterday. Pt is medically ready for discharge today. Pt has completed VAD education. Discussed with [facility] and no bed anticipated today or tomorrow. Updated pt. Pt is a new VAD pt and cannot go to any other facility.  Writer spoke to pt's wife via phone ([phone]). She is aware of the new visitor policy and plans on returning home while pt is at rehab. Wife is currently staying at the [facility].     Intervention: Supportive Visit, Discharge Planning   Assessment: Pt continues to be agreeable to plan and eager to transition to rehab. Pt understands we are waiting for a bed to become available.   Education provided by SW: Ongoing Social Work support, discharge planning   Plan:    Discharge Plans in Progress: [facility] TCU vs ARU     Barriers to d/c plan: Bed availability at rehab    Follow up Plan: SW will continue to collaborate with [facility] and coordinate discharge once bed available.

## 2020-03-17 NOTE — PLAN OF CARE
D: Admitted 2/06 for advanced heart failure options, s/p LVAD HM 3 on 2/18 c/b hemothorax s/p washout 3/6 and ICD placement 3/16. Hx of chronic systolic heart failure, NICM, moderate CAD, HTN, ABHINAV on CPAP, DM2 and CKD3.    I: Monitored vitals and assessed pt status.   Changed:  Pressure dressing removed from left upper chest ICD site. Primipore dressing to stay in place until 3/18  Planning for TCU discharge    Running:  N/A    PRN:  Tylenol once for mild incisional pain/tenderness     A: VSS, A&O, up SBA. Pt denies pain throughout shift. Oxygen stable on RA, wears CPAP at night. Tele A flutter, HR 70's. LVAD numbers WNL, no alarms. ICD site looking mildly swollen, pt reports mild tenderness, CVTS notified, ice pack applied. Adequate urine output, (+) for BM today.     Temp:  [98  F (36.7  C)-98.7  F (37.1  C)] 98.2  F (36.8  C)  Heart Rate:  [] 100  Resp:  [16-18] 18  BP: ()/(57-92) 100/65  SpO2:  [93 %-97 %] 97 %      P: Continue to monitor Pt status and report changes to treatment team.

## 2020-03-17 NOTE — PHARMACY-ANTICOAGULATION SERVICE
Clinical Pharmacy - Warfarin Dosing Consult     Pharmacy has been consulted to manage this patient s warfarin therapy.  Indication: LVAD/RVAD  Therapy Goal: INR 2-3 -- updated from previous goal of Chromogenic Factor 10 of 20-40% (patient now off of bivalrudin)    INR   Date Value Ref Range Status   03/17/2020 3.54 (H) 0.86 - 1.14 Final   03/16/2020 3.15 (H) 0.86 - 1.14 Final     Chromogenic Factor 10   Date Value Ref Range Status   03/16/2020 27 (L) 70 - 130 % Final     Comment:     Therapeutic Range:  A Chromogenic Factor 10 level of approximately 20-40%   inversely correlates with an INR of 2-3 for patients receiving Warfarin.   Chromogenic Factor 10 levels below 20% indicate an INR greater than 3 and   levels above 40% indicate an INR less than 2.       Recent Warfarin doses and INR values:          Recommend warfarin 2.5 mg today given patient is supratherapeutic.  This is ~ 50% reduction from yesterday's dose in an attempt to reduce the INR trajectory and get value back in goal range.  Pharmacy will monitor Eliseo Tanner daily and order warfarin doses to achieve specified goal.      Please contact pharmacy as soon as possible if the warfarin needs to be held for a procedure or if the warfarin goals change.      Yeyo Jackson, PharmD -- pager [phone]

## 2020-03-17 NOTE — PROGRESS NOTES
[facility]   Cardiology II Service / Advanced Heart Failure  Daily Progress Note  Date of Service: 3/17/2020      Patient: Eliseo Tanner  MRN: [id number]  Admission Date: 2/6/2020  Hospital Day # 40    Assessment and Plan: Eliseo Tanner is a 64 year old male with chronic systolic heart failure secondary to NICM, moderate CAD, HTN, ABHINAV on CPAP, DM2, and CKD Stage III. He was transferred to [facility] for evaluation and management of advanced heart failure with arrhythmia now s/p HM 3 on 2/18. Cardiology consult for volume management.    Chronic SCHF secondary to NICM s/p HM III with RV dysfunction. Implanted 2/18 and complicated by bleeding. Echo at 5600 rpm notes mild-moderate RV dysfunction with AV closed and trace AI, current speed 5500 rpm.   Stage D  NYHA Class IIIB  ACEi/ARB Lisinopril 10 mg po daily. Hydralazine 100 mg po TID.   BB Defer s/p LVAD  Aldosterone antagonist deferred while other medical therapy is prioritized  SCD prophylaxis ICD implanted 3/16.`  Fluid status euvolemic. Lasix 40 mg po daily   MAP: 89  LDH: 294 2/18  Anticoagulation: Coumadin per pharmacy. INR-3.54, Goal 2-3.  Antiplatelet: ASA 81 mg po daily  - Continue Digoxin for RV support.     The patient's HeartMate LVAD was interrogated 3/17/2020  * Speed 5500 rpm   * Pulsatility index 3.5   * Power 4.4 Driver   * Flow 4.4 L/minute   Fluid status: Euvolemic   Alarms were reviewed, and negative.   The driveline exit site was covered with dressing clean, dry, and intact.   All external components were inspected and showed no evidence of damage or malfunction.    HTN.   - MAP 89 today.   - Continue Lisinopril and Hydralazine as above.     New Onset Afib s/p DCCV/history of Aflutter. ECG consistent with Afib w/ RVR and aberrancy vs. longterm vs. AT vs. Slow VT. S/p DCCV on 2/28 back into sinus rhythm.  - Coumadin as above.   - continue Digoxin.     Retrosternal Hematoma. CT chest 2/26: measuring 5.1 cm. S/P chest tube per CVTS  "3/5, d/c'ed 3/15.   - Hgb stable. Management per CVTS.     Chronic/Resolved Medical Conditions:  CAD. Continue ASA and statin. BB deferred s/p LVAD.  Proteus and Enterococcus Bacteremia, resolved. Blood cx 2/13 positive 2/2. Blood cultures 2/15 +1/2 S.epi, likely contaminant per ID. Zosyn (2/13-2/14) Tobramycin (2/13-2/14) Vancomycin (2/13-2/17) Meropenem (2/14-2/15) Ceftriaxone (2/16-2/28) -Ampicillin (2/16-3/11) (14 days after swan removal on 2/26).Blood cx negative since 2/15, ordered [id number] 3/15 NTD.   Thrombocytopenia. HIT positive DAVINA negative. Antibody negative x2, thus no further indication for PLEX. Heme consulted. Continue Coumadin as above.   VF s/p external shock. Continue Amio, s/p ICD 3/16.   ================================================================    Interval History/ROS: He is feeling well. He denies fever, chills, chest pain, palpitations, cough, nausea, vomiting, diarrhea, melena, hematochezia, hematemesis, LVAD drive line drainage, and LE edema. He is tolerating oral intake and ambulation. He is tolerating activity with therapy.     Last 24 hr care team notes reviewed.   ROS:  4 point ROS including Respiratory, CV, GI and , other than that noted in the HPI, is negative.     Medications: Reviewed in EPIC.     Physical Exam:   BP (!) 89/62 (BP Location: Left arm)   Pulse 82   Temp 98.8  F (37.1  C) (Oral)   Resp 18   Ht 1.702 m (5' 7\")   Wt 89 kg (196 lb 3.2 oz)   SpO2 97%   BMI 30.73 kg/m    GENERAL: Appears alert and oriented times three.   HEENT: Eye symmetrical and free of discharge bilaterally. Mucous membranes moist and without lesions.  NECK: Supple and without lymphadenopathy. JVD 8-10 cm.   CV: RRR, S1S2 present with LVAD hum.   RESPIRATORY: Respirations regular, even, and unlabored. Lungs CTA throughout.   GI: Soft and non distended with normoactive bowel sounds present in all quadrants. No tenderness, rebound, guarding. No organomegaly.   EXTREMITIES: Trace bilateral LE " peripheral edema. 2+ bilateral pedal pulses.   NEUROLOGIC: Alert and orientated x 3. CN II-XII grossly intact. No focal deficits.   MUSCULOSKELETAL: No joint swelling or tenderness.   SKIN: No jaundice. No rashes or lesions. LVAD drive line site CDI.     Data:  CMP  Recent Labs   Lab 03/17/20  0508 03/16/20  0526 03/15/20  0655 03/14/20  0524    136 137 134   POTASSIUM 4.2 4.0 3.9 3.9   CHLORIDE 103 103 104 100   CO2 29 28 29 31   ANIONGAP 6 5 4 3   * 114* 108* 115*   BUN 25 25 22 22   CR 1.04 1.08 1.03 1.08   GFRESTIMATED 74 71 75 71   GFRESTBLACK 86 82 87 82   CALOS 8.1* 8.4* 8.1* 7.9*   MAG 2.2 1.9 2.0 2.1     CBC  Recent Labs   Lab 03/17/20  0508 03/16/20  0526 03/15/20  2057 03/15/20  1459 03/15/20  0655 03/14/20  0524   WBC 10.2 9.9  --   --  9.1 8.9   RBC 3.12* 3.05*  --   --  2.92* 2.82*   HGB 8.2* 8.1* 7.6* 8.8* 8.0* 7.6*   HCT 28.9* 28.0*  --   --  27.2* 26.2*   MCV 93 92  --   --  93 93   MCH 26.3* 26.6  --   --  27.4 27.0   MCHC 28.4* 28.9*  --   --  29.4* 29.0*   RDW 17.4* 17.3*  --   --  17.1* 17.2*   * 379  --   --  380 427     INR  Recent Labs   Lab 03/17/20  0508 03/16/20  0526 03/15/20  0655 03/13/20  0551   INR 3.54* 3.15* 2.97* 2.76*       Patient discussed with Dr. Cisneros.      Laine Leon [facility]  3/17/2020

## 2020-03-17 NOTE — PLAN OF CARE
Discharge Planner PT   Patient plan for discharge: Rehab  Current status: Focus on progression with mobility.  Pt needing [place] for bed mobility and STS with use of heart pillow to assist with maintaining precautions.  Pt ambulated 400' with walker and CGA then 50' with cane/walking stick and CGA.   Barriers to return to prior living situation: Need for increased assist with all mobility within precautions, LE weakness, impaired balance  Recommendations for discharge: ARU  Rationale for recommendations: Pt would benefit from daily intensive therapy to progress IND with mobility.       Entered by: Jie Langford 03/17/2020 4:50 PM

## 2020-03-18 ENCOUNTER — APPOINTMENT (OUTPATIENT)
Dept: OCCUPATIONAL THERAPY | Facility: CLINIC | Age: 65
DRG: 001 | End: 2020-03-18
Attending: INTERNAL MEDICINE
Payer: MEDICARE

## 2020-03-18 ENCOUNTER — APPOINTMENT (OUTPATIENT)
Dept: GENERAL RADIOLOGY | Facility: CLINIC | Age: 65
DRG: 001 | End: 2020-03-18
Attending: PHYSICIAN ASSISTANT
Payer: MEDICARE

## 2020-03-18 LAB
ANION GAP SERPL CALCULATED.3IONS-SCNC: 6 MMOL/L (ref 3–14)
APTT PPP: 68 SEC (ref 22–37)
BUN SERPL-MCNC: 23 MG/DL (ref 7–30)
CALCIUM SERPL-MCNC: 8.3 MG/DL (ref 8.5–10.1)
CHLORIDE SERPL-SCNC: 102 MMOL/L (ref 94–109)
CO2 SERPL-SCNC: 29 MMOL/L (ref 20–32)
CREAT SERPL-MCNC: 1.14 MG/DL (ref 0.66–1.25)
ERYTHROCYTE [DISTWIDTH] IN BLOOD BY AUTOMATED COUNT: 17.2 % (ref 10–15)
GFR SERPL CREATININE-BSD FRML MDRD: 66 ML/MIN/{1.73_M2}
GLUCOSE BLDC GLUCOMTR-MCNC: 109 MG/DL (ref 70–99)
GLUCOSE BLDC GLUCOMTR-MCNC: 116 MG/DL (ref 70–99)
GLUCOSE BLDC GLUCOMTR-MCNC: 116 MG/DL (ref 70–99)
GLUCOSE BLDC GLUCOMTR-MCNC: 126 MG/DL (ref 70–99)
GLUCOSE BLDC GLUCOMTR-MCNC: 137 MG/DL (ref 70–99)
GLUCOSE BLDC GLUCOMTR-MCNC: 173 MG/DL (ref 70–99)
GLUCOSE SERPL-MCNC: 109 MG/DL (ref 70–99)
HCT VFR BLD AUTO: 29.1 % (ref 40–53)
HGB BLD-MCNC: 8.3 G/DL (ref 13.3–17.7)
INR PPP: 3.32 (ref 0.86–1.14)
MAGNESIUM SERPL-MCNC: 2.1 MG/DL (ref 1.6–2.3)
MCH RBC QN AUTO: 26.2 PG (ref 26.5–33)
MCHC RBC AUTO-ENTMCNC: 28.5 G/DL (ref 31.5–36.5)
MCV RBC AUTO: 92 FL (ref 78–100)
PLATELET # BLD AUTO: 484 10E9/L (ref 150–450)
POTASSIUM SERPL-SCNC: 4 MMOL/L (ref 3.4–5.3)
RBC # BLD AUTO: 3.17 10E12/L (ref 4.4–5.9)
SODIUM SERPL-SCNC: 137 MMOL/L (ref 133–144)
WBC # BLD AUTO: 9.1 10E9/L (ref 4–11)

## 2020-03-18 PROCEDURE — 97110 THERAPEUTIC EXERCISES: CPT | Mod: GO | Performed by: OCCUPATIONAL THERAPIST

## 2020-03-18 PROCEDURE — 25000132 ZZH RX MED GY IP 250 OP 250 PS 637: Mod: GY | Performed by: PHYSICIAN ASSISTANT

## 2020-03-18 PROCEDURE — 85730 THROMBOPLASTIN TIME PARTIAL: CPT | Performed by: THORACIC SURGERY (CARDIOTHORACIC VASCULAR SURGERY)

## 2020-03-18 PROCEDURE — 25000132 ZZH RX MED GY IP 250 OP 250 PS 637: Mod: GY | Performed by: NURSE PRACTITIONER

## 2020-03-18 PROCEDURE — 85610 PROTHROMBIN TIME: CPT | Performed by: THORACIC SURGERY (CARDIOTHORACIC VASCULAR SURGERY)

## 2020-03-18 PROCEDURE — 83735 ASSAY OF MAGNESIUM: CPT | Performed by: THORACIC SURGERY (CARDIOTHORACIC VASCULAR SURGERY)

## 2020-03-18 PROCEDURE — 85027 COMPLETE CBC AUTOMATED: CPT | Performed by: THORACIC SURGERY (CARDIOTHORACIC VASCULAR SURGERY)

## 2020-03-18 PROCEDURE — 93750 INTERROGATION VAD IN PERSON: CPT | Performed by: NURSE PRACTITIONER

## 2020-03-18 PROCEDURE — 25000132 ZZH RX MED GY IP 250 OP 250 PS 637: Mod: GY

## 2020-03-18 PROCEDURE — 25000132 ZZH RX MED GY IP 250 OP 250 PS 637: Mod: GY | Performed by: STUDENT IN AN ORGANIZED HEALTH CARE EDUCATION/TRAINING PROGRAM

## 2020-03-18 PROCEDURE — 80048 BASIC METABOLIC PNL TOTAL CA: CPT | Performed by: THORACIC SURGERY (CARDIOTHORACIC VASCULAR SURGERY)

## 2020-03-18 PROCEDURE — 71046 X-RAY EXAM CHEST 2 VIEWS: CPT

## 2020-03-18 PROCEDURE — 21400000 ZZH R&B CCU UMMC

## 2020-03-18 PROCEDURE — 97535 SELF CARE MNGMENT TRAINING: CPT | Mod: GO | Performed by: OCCUPATIONAL THERAPIST

## 2020-03-18 PROCEDURE — 36415 COLL VENOUS BLD VENIPUNCTURE: CPT | Performed by: THORACIC SURGERY (CARDIOTHORACIC VASCULAR SURGERY)

## 2020-03-18 PROCEDURE — 99232 SBSQ HOSP IP/OBS MODERATE 35: CPT | Mod: 25 | Performed by: NURSE PRACTITIONER

## 2020-03-18 PROCEDURE — 00000146 ZZHCL STATISTIC GLUCOSE BY METER IP

## 2020-03-18 RX ORDER — WARFARIN SODIUM 4 MG/1
4 TABLET ORAL
Status: COMPLETED | OUTPATIENT
Start: 2020-03-18 | End: 2020-03-18

## 2020-03-18 RX ADMIN — MAGNESIUM OXIDE 400 MG: 400 TABLET ORAL at 08:30

## 2020-03-18 RX ADMIN — ATORVASTATIN CALCIUM 20 MG: 20 TABLET, FILM COATED ORAL at 19:56

## 2020-03-18 RX ADMIN — HYDRALAZINE HYDROCHLORIDE 100 MG: 100 TABLET, FILM COATED ORAL at 06:06

## 2020-03-18 RX ADMIN — FUROSEMIDE 40 MG: 40 TABLET ORAL at 08:31

## 2020-03-18 RX ADMIN — LISINOPRIL 10 MG: 10 TABLET ORAL at 08:31

## 2020-03-18 RX ADMIN — MULTIPLE VITAMINS W/ MINERALS TAB 1 TABLET: TAB at 08:31

## 2020-03-18 RX ADMIN — CEPHALEXIN 250 MG: 250 CAPSULE ORAL at 06:06

## 2020-03-18 RX ADMIN — WARFARIN SODIUM 4 MG: 4 TABLET ORAL at 17:04

## 2020-03-18 RX ADMIN — INSULIN ASPART 2 UNITS: 100 INJECTION, SOLUTION INTRAVENOUS; SUBCUTANEOUS at 11:55

## 2020-03-18 RX ADMIN — DIGOXIN 125 MCG: 125 TABLET ORAL at 08:30

## 2020-03-18 RX ADMIN — Medication 200 MG: at 08:30

## 2020-03-18 RX ADMIN — PANTOPRAZOLE SODIUM 40 MG: 40 TABLET, DELAYED RELEASE ORAL at 08:31

## 2020-03-18 RX ADMIN — FLUOXETINE 20 MG: 20 CAPSULE ORAL at 08:30

## 2020-03-18 RX ADMIN — CEPHALEXIN 250 MG: 250 CAPSULE ORAL at 13:54

## 2020-03-18 RX ADMIN — AMIODARONE HYDROCHLORIDE 200 MG: 200 TABLET ORAL at 08:31

## 2020-03-18 RX ADMIN — HYDRALAZINE HYDROCHLORIDE 100 MG: 100 TABLET, FILM COATED ORAL at 22:08

## 2020-03-18 RX ADMIN — POTASSIUM CHLORIDE 20 MEQ: 750 TABLET, EXTENDED RELEASE ORAL at 08:31

## 2020-03-18 RX ADMIN — ASPIRIN 81 MG CHEWABLE TABLET 81 MG: 81 TABLET CHEWABLE at 08:30

## 2020-03-18 RX ADMIN — POTASSIUM CHLORIDE 20 MEQ: 750 TABLET, EXTENDED RELEASE ORAL at 13:54

## 2020-03-18 RX ADMIN — CEPHALEXIN 250 MG: 250 CAPSULE ORAL at 22:08

## 2020-03-18 RX ADMIN — HYDRALAZINE HYDROCHLORIDE 100 MG: 100 TABLET, FILM COATED ORAL at 13:54

## 2020-03-18 RX ADMIN — ALLOPURINOL 200 MG: 100 TABLET ORAL at 08:31

## 2020-03-18 ASSESSMENT — ACTIVITIES OF DAILY LIVING (ADL)
ADLS_ACUITY_SCORE: 16

## 2020-03-18 ASSESSMENT — MIFFLIN-ST. JEOR: SCORE: 1630.63

## 2020-03-18 NOTE — PLAN OF CARE
D: Admitted 2/06 for advanced heart failure options, s/p LVAD HM 3 on 2/18 c/b hemothorax s/p washout 3/6 and ICD placement 3/16. Hx of chronic systolic heart failure, NICM, moderate CAD, HTN, ABHINAV on CPAP, DM2 and CKD3    I: Monitored vitals and assessed pt status.   Changed:  K of 4.0 replaced per protocol, recheck in AM    Running:  N/A    PRN:  Tylenol and Robaxin for upper left chest incisional pain    A: VSS, A&O, up SBA. Pt reports tenderness at ICD site, site mildly swollen. Tenderness relieved with ice packs for most of shift. Oxygen remains stable on RA. Tele A flutter, HR 90's. LVAD numbers WNL, no alarms. Adequate urine output.     Temp:  [97.8  F (36.6  C)-98.5  F (36.9  C)] 98.4  F (36.9  C)  Pulse:  [82-95] 95  Heart Rate:  [60-96] 90  Resp:  [16-18] 18  BP: ()/(53-94) 93/70  SpO2:  [95 %-99 %] 96 %      P: Continue to monitor Pt status and report changes to treatment team.

## 2020-03-18 NOTE — PLAN OF CARE
D: Pt admitted for advanced heart failure therapies, now s/p HM3 2/18 c/b hemothorax s/p washout 3/6 and ICD placement 3/16. Hx of chronic systolic heart failure, NICM, moderate CAD, HTN, ABHINAV on CPAP, DM2 and CKD3.    I/A: Pt alert and oriented x4. Pt A.Fib/A.Flutter with HRs 70-100s. LVAD free of alarms this shift, numbers WDL. Dressing CDI. Pt on RA with O2 sats >92%. Home CPAP at the bedside and in use when sleeping. Pt reports L upper incisional chest pain where ICD was placed, cold packs in use. Dressing with old scant drainage. Writer reinforced extremity restricitons with pt, pt verbally agrees and understands. Pt appetite good, denies nausea. BG checks done ACHS; sliding scale given 2x, carb coverage given 1x, bedtime glargine also given. Last BM today. Pt voiding. Up with +1 assist-SBA in room.      P: Pt medically stable and ready for discharge. Pt to discharge once bed available at  Rehab. Continue to monitor and assess pt with every encounter. Notify CVTS with any changes or concerns.

## 2020-03-18 NOTE — PLAN OF CARE
D: Admitted 2/06 for advanced heart failure options, s/p LVAD HM 3 on 2/18 c/b hemothorax s/p washout 3/6 and ICD placement 3/16. Hx of chronic systolic heart failure, NICM, moderate CAD, HTN, ABHINAV on CPAP, DM2 and CKD3.     I: Monitored vitals and assessed pt status.     A: A0x4. VSS on RA/CPAP at night. Tele shows A-flutter rate 60-80s. Afebrile. Urinating adequately via urinal. Up SBA. Pt denied pain throughout shift. [place]. LVAD numbers WNL, no alarms. ICD site looking mildly swollen with small amount of shadowing on primipore. Pt reports mild tenderness, ice pack used for relief.      P: Continue to monitor Pt status and report changes to CVTS. Pt to discharge once bed available at  Rehab.      POC: [phone]  Antonette Rebolledo RN on 3/18/2020 at 6:40 AM

## 2020-03-18 NOTE — PROGRESS NOTES
LVAD Social Work Services Progress Note      Date of Initial Social Work Evaluation: 2/10/2020  Collaborated with: CVTS, [facility] and pt     Data: Pt is s/p HM3 LVAD implant on 2/18 and ICD implant yesterday. Pt continues to be medically stable for discharge. Discussed with [facility] and no bed available today.     Intervention: Discharge Planning   Assessment: Pt updated at bedside and expressed understanding. He is eager to transition to [facility] when bed available.   Education provided by SW: Ongoing Social Work support, discharge planning   Plan:    Discharge Plans in Progress: Waiting for bed at [facility].     Barriers to d/c plan: Bed availability     Follow up Plan: SW to continue to follow and will coordinate discharge once bed available at ARU.

## 2020-03-18 NOTE — PROGRESS NOTES
CARDIOTHORACIC SURGERY PROGRESS NOTE  03/18/2020      SUBJECTIVE:    No acute issues over night.   Pain is well controlled. Does report some pain and swelling in around ICD. Reports breathing is okay, no SOB.   Patient denies CP, fever, chills, sweats, lightheadedness, dizziness.   Patient has been tolerating diet. + BM. + flatus.    OBJECTIVE:   Temp:  [97.8  F (36.6  C)-98.8  F (37.1  C)] 98.4  F (36.9  C)  Pulse:  [82-95] 95  Heart Rate:  [60-96] 96  Resp:  [16-18] 16  BP: ()/(53-94) 90/76  SpO2:  [95 %-99 %] 95 %    Gen: NAD, sitting up in chair  CV: LVAD hum present  Pulm: diminished bases  Abd: soft, non-tender, no guarding, +BS  Ext: trace bilateral lower extremity edema  Incision: clean, dry, intact, no erythema. Dressing c/d to left ICD site, mild swelling, no hematoma  Chest Tube sites: dressings clean and dry  Neuro: grossly normal  Psych: calm, cooperative     I&O's:  I/O last 3 completed shifts:  In: 1070 [P.O.:960; I.V.:110]  Out: 1930 [Urine:1930]    Labs:   BMP  Recent Labs   Lab 03/18/20  0553 03/17/20  0508 03/16/20  0526 03/15/20  0655    138 136 137   POTASSIUM 4.0 4.2 4.0 3.9   CHLORIDE 102 103 103 104   CALOS 8.3* 8.1* 8.4* 8.1*   CO2 29 29 28 29   BUN 23 25 25 22   CR 1.14 1.04 1.08 1.03   * 120* 114* 108*     CBC  Recent Labs   Lab 03/18/20  0553 03/17/20  0508 03/16/20  0526 03/15/20  2057  03/15/20  0655   WBC 9.1 10.2 9.9  --   --  9.1   RBC 3.17* 3.12* 3.05*  --   --  2.92*   HGB 8.3* 8.2* 8.1* 7.6*   < > 8.0*   HCT 29.1* 28.9* 28.0*  --   --  27.2*   MCV 92 93 92  --   --  93   MCH 26.2* 26.3* 26.6  --   --  27.4   MCHC 28.5* 28.4* 28.9*  --   --  29.4*   RDW 17.2* 17.4* 17.3*  --   --  17.1*   * 466* 379  --   --  380    < > = values in this interval not displayed.     INR  Recent Labs   Lab 03/18/20  0553 03/17/20  0508 03/16/20  0526 03/15/20  0655   INR 3.32* 3.54* 3.15* 2.97*      Hepatic Panel No lab results found in last 7 days.  Glucose  Recent Labs    Lab 03/18/20  1149 03/18/20  0806 03/18/20  0604 03/18/20  0553 03/18/20  0301 03/17/20  2056 03/17/20  1619  03/17/20  0508  03/16/20  0526  03/15/20  0655  03/14/20  0524  03/13/20  0551   GLC  --   --   --  109*  --   --   --   --  120*  --  114*  --  108*  --  115*  --  120*   * 116* 109*  --  116* 154* 178*   < >  --    < >  --    < >  --    < >  --    < >  --     < > = values in this interval not displayed.       Imaging:   Recent Results (from the past 24 hour(s))   XR Chest 2 Views    Narrative    PA and lateral chest    HISTORY: Evaluate right effusion    COMPARISON STUDY: 3/17/2020    FINDINGS: Cardiac silhouette is not enlarged. LVAD in place. Left  transvenous single lead implantable cardiac defibrillator. The fluid  trapped in the right major and minor fissure. Lung volumes are low.  Surgical clips left upper quadrant.      Impression    IMPRESSION: Unchanged loculated right pleural effusion within the  right major /minor fissures.    QUIQUE NICHOLS MD         ASSESSMENT/PLAN: Patient is a 64 year old male with a history of chronic systolic heart failure, NICM, moderate CAD, HTN, ABHINAV on CPAP, DM2, CKDIII who is transferred to [facility] for evaluation and management of advanced heart failure with arrhythmia now s/p HM 3 on 2/18 with Dr. Jaramillo. 3/4 had hemothorax requiring a chest tube and to OR for VATS washout of right hemothorax on 3/6.      Neuro:   # Postop Pain  - Neuro intact  - Tylenol and oxycodone   # Depression/anxiety  - Fluoxetine 20 mg daily     CV:   # Endstage NICM S/P LVAD HM3  # RV dysfunction  - TTE 02/25 @5600 RPM, septum midline, mod-severe RV dysfunction, aortic valve closed. Current speed 5500  - Digoxin 125 mcg daily for RV support  - MAPs 70s-80s. Lisinopril 10 mg daily, hydralazine 100 mg q8H. Avoid BB with RV dysfunction.   - Hypervolemic, improving. Lasix IV 40 03/13. Lasix PO 40 mg daily  - Aspirin on hold, will review with Dr. Jaramillo now that chest tube removed, bival  off  - Warfarin to CFX goal 20-40. Therapeutic. Discontinue CFX, INR therapeutic off bival  - Right pleural chest tubes removed 03/16  # Atrial fibrillation  # VT  - EP placed ICD 3/17, ICD check shows normal function  - S/p DCCV 02/28  - Amiodarone 200 mg daily     Pulm:   # Postop mechanical ventilation, resolved  - Extubated POD 2 02/20  - Pulm toilet, IS, activity and deep breathing   - Supplemental O2 PRN to keep sats > 92%.   # ABHINAV  - Home CPAP  # Right Hemothorax  - Chest tube placed 03/04, removed 3/15, f/u CXR unremarkable  - VATS 3/6, extensive clot burden removed, CXR 3/17 with slightly increased effusion, recheck today stable, continue diuresis     ID:   # Proteus and enterococcus bacteremia  - Organisms growing from 2/2 blood cultures 2/13  - ID consulted, now following peripherally  ampicillin 2 grams q 6 hrs (14 days after removal of all central venous catheters (2/26 - 3/11))   - ID recommends surveillance cultures every week x 4 weeks after stopping all antibiotics (note on 2/25/20). Repeat cultures for surveillance ordered 3/13, NGTD.   - WBC WNL, afebrile, no signs or symptoms of infection      GI / FEN:  # Nutrition  - Reg diet, bowel regimen     Renal / :   # CKD stage 3  - Creatinine WNL, trend   - Diuresis as above  - Avoid nephroxtoxins   # Gout  - Allopurinol 200 mg daily     Heme:  # Acute postop blood loss anemia  - Hgb and Plts stable  - Transfuse PRN  # ANA  - Completed plasmapheresis preop   - Warfarin as above     Endo:   # DMT2  # Stress hyperglycemia  - Lantus 10 unit(s) at bedtime plus SSI     PPX:   - GI: Protonix  - DVT: warfarin     Dispo:   - 6C since 3/9  - Anticipate DC to TCU per PT/OT pending ICD placement, medically ready as of 3/17, waiting for TCU bed.    Staff surgeons have been informed of changes through both verbal and written communication.         Mono Gambino PA-C  Cardiothoracic Surgery  p: [phone]

## 2020-03-18 NOTE — PROGRESS NOTES
[facility]   Cardiology II Service / Advanced Heart Failure  Daily Progress Note  Date of Service: 3/18/2020      Patient: Eliseo Tanner  MRN: [id number]  Admission Date: 2/6/2020  Hospital Day # 41    Assessment and Plan: Eliseo Tanner is a 64 year old male with chronic systolic heart failure secondary to NICM, moderate CAD, HTN, ABHINAV on CPAP, DM2, and CKD Stage III. He was transferred to [facility] for evaluation and management of advanced heart failure with arrhythmia now s/p HM 3 on 2/18. Cardiology consult for volume management.     Chronic SCHF secondary to NICM s/p HM III with RV dysfunction. Implanted 2/18 and complicated by bleeding. Echo at 5600 rpm notes mild-moderate RV dysfunction with AV closed and trace AI, current speed 5500 rpm.   Stage D, NYHA Class IIIB  ACEi/ARB Lisinopril 10 mg po daily. Hydralazine 100 mg po TID.   BB Defer s/p LVAD  Aldosterone antagonist deferred while other medical therapy is prioritized  SCD prophylaxis ICD implanted 3/16.`  Fluid status euvolemic. Lasix 40 mg po daily   MAP: 68  LDH: 294 2/18  Anticoagulation: Coumadin per pharmacy. INR-3.32, Goal 2-3.  Antiplatelet: ASA 81 mg po daily  - Continue Digoxin for RV support.      The patient's HeartMate LVAD was interrogated 3/18/2020  * Speed 5500 rpm   * Pulsatility index 3.3  * Power 4.3 Driver   * Flow 4.5 L/minute   Fluid status: Euvolemic   Alarms were reviewed, and rare PI events.   The driveline exit site was covered with dressing clean, dry, and intact.   All external components were inspected and showed no evidence of damage or malfunction.     HTN.   - MAP 68 today.   - Continue Lisinopril and Hydralazine as above.      New Onset Afib s/p DCCV/history of Aflutter. ECG consistent with Afib w/ RVR and aberrancy vs. FDC vs. AT vs. Slow VT. S/p DCCV on 2/28 back into sinus rhythm.  - Coumadin as above.   - Continue Digoxin.      Retrosternal Hematoma. CT chest 2/26: measuring 5.1 cm. S/P chest tube  "per CVTS 3/5, d/c'ed 3/15.   - Hgb stable. Management per CVTS.   - Chest X-ray today with stable fluid to right interlobular fissure, Hgb stable.      Chronic/Resolved Medical Conditions:  CAD. Continue ASA and statin. BB deferred s/p LVAD.  Proteus and Enterococcus Bacteremia, resolved. Blood cx 2/13 positive 2/2. Blood cultures 2/15 +1/2 S.epi, likely contaminant per ID. Zosyn (2/13-2/14) Tobramycin (2/13-2/14) Vancomycin (2/13-2/17) Meropenem (2/14-2/15) Ceftriaxone (2/16-2/28) -Ampicillin (2/16-3/11) (14 days after swan removal on 2/26).Blood cx negative since 2/15, ordered [id number] 3/15 NTD.   Thrombocytopenia. HIT positive DAVINA negative. Antibody negative x2, thus no further indication for PLEX. Heme consulted. Continue Coumadin as above.   VF s/p external shock. Continue Amio, s/p ICD 3/16.   ================================================================    Interval History/ROS: He is feeling well. He denies fever, chills, chest pain, palpitations, SOB, MIRANDA, cough, nausea, vomiting, diarrhea, hematochezia, hematemesis, and LE edema. He denies any concerns at his drive line site. He is tolerating oral intake and ambulation with therapy.     Last 24 hr care team notes reviewed.   ROS:  4 point ROS including Respiratory, CV, GI and , other than that noted in the HPI, is negative.     Medications: Reviewed in EPIC.     Physical Exam:   BP 90/76 (BP Location: Left arm)   Pulse 95   Temp 98.4  F (36.9  C) (Oral)   Resp 16   Ht 1.702 m (5' 7\")   Wt 89.2 kg (196 lb 10.4 oz)   SpO2 95%   BMI 30.80 kg/m    GENERAL: Appears alert and oriented times three.   HEENT: Eye symmetrical and free of discharge bilaterally. Mucous membranes moist and without lesions.  NECK: Supple and without lymphadenopathy. JVD 8 cm   CV: RRR, S1S2 present with LVAD hum.   RESPIRATORY: Respirations regular, even, and unlabored. Lungs CTA throughout.   GI: Soft and non distended with normoactive bowel sounds present in all quadrants. " No tenderness, rebound, guarding. No organomegaly.   EXTREMITIES: Trace bilateral LE peripheral edema. 2+ bilateral pedal pulses.   NEUROLOGIC: Alert and orientated x 3. CN II-XII grossly intact. No focal deficits.   MUSCULOSKELETAL: No joint swelling or tenderness.   SKIN: No jaundice. No rashes or lesions. LVAD drive line covered.     Data:  CMP  Recent Labs   Lab 03/18/20  0553 03/17/20  0508 03/16/20  0526 03/15/20  0655    138 136 137   POTASSIUM 4.0 4.2 4.0 3.9   CHLORIDE 102 103 103 104   CO2 29 29 28 29   ANIONGAP 6 6 5 4   * 120* 114* 108*   BUN 23 25 25 22   CR 1.14 1.04 1.08 1.03   GFRESTIMATED 66 74 71 75   GFRESTBLACK 77 86 82 87   CALOS 8.3* 8.1* 8.4* 8.1*   MAG 2.1 2.2 1.9 2.0     CBC  Recent Labs   Lab 03/18/20  0553 03/17/20  0508 03/16/20  0526 03/15/20  2057  03/15/20  0655   WBC 9.1 10.2 9.9  --   --  9.1   RBC 3.17* 3.12* 3.05*  --   --  2.92*   HGB 8.3* 8.2* 8.1* 7.6*   < > 8.0*   HCT 29.1* 28.9* 28.0*  --   --  27.2*   MCV 92 93 92  --   --  93   MCH 26.2* 26.3* 26.6  --   --  27.4   MCHC 28.5* 28.4* 28.9*  --   --  29.4*   RDW 17.2* 17.4* 17.3*  --   --  17.1*   * 466* 379  --   --  380    < > = values in this interval not displayed.     INR  Recent Labs   Lab 03/18/20  0553 03/17/20  0508 03/16/20  0526 03/15/20  0655   INR 3.32* 3.54* 3.15* 2.97*       Patient discussed with Dr. Cisneros.      Laine Leon [facility]  3/18/2020

## 2020-03-18 NOTE — PLAN OF CARE
Discharge Planner OT   Patient plan for discharge: ARU  Current status: pt min assist LE dressing, difficulty standing from low surfaces e.g., toilets and benches in hallway where pt resting during ambulation. Pt with LOB x1 with min assist for self correct walking in hallway today with walking stick.   Barriers to return to prior living situation: post surgical precaution and deconditioning.   Recommendations for discharge: ARU  Rationale for recommendations: pt below baseline in ADL I safety and currently at risk of further injury as pt unable to stand from low surfaces without assist.        Entered by: Betito Day 03/18/2020 10:23 AM

## 2020-03-19 ENCOUNTER — APPOINTMENT (OUTPATIENT)
Dept: PHYSICAL THERAPY | Facility: CLINIC | Age: 65
DRG: 001 | End: 2020-03-19
Attending: INTERNAL MEDICINE
Payer: MEDICARE

## 2020-03-19 ENCOUNTER — APPOINTMENT (OUTPATIENT)
Dept: OCCUPATIONAL THERAPY | Facility: CLINIC | Age: 65
DRG: 001 | End: 2020-03-19
Attending: INTERNAL MEDICINE
Payer: MEDICARE

## 2020-03-19 LAB
ANION GAP SERPL CALCULATED.3IONS-SCNC: 6 MMOL/L (ref 3–14)
APTT PPP: 65 SEC (ref 22–37)
BACTERIA SPEC CULT: NO GROWTH
BACTERIA SPEC CULT: NO GROWTH
BUN SERPL-MCNC: 26 MG/DL (ref 7–30)
CALCIUM SERPL-MCNC: 8.3 MG/DL (ref 8.5–10.1)
CHLORIDE SERPL-SCNC: 103 MMOL/L (ref 94–109)
CO2 SERPL-SCNC: 29 MMOL/L (ref 20–32)
CREAT SERPL-MCNC: 1.08 MG/DL (ref 0.66–1.25)
ERYTHROCYTE [DISTWIDTH] IN BLOOD BY AUTOMATED COUNT: 17.2 % (ref 10–15)
GFR SERPL CREATININE-BSD FRML MDRD: 71 ML/MIN/{1.73_M2}
GLUCOSE BLDC GLUCOMTR-MCNC: 113 MG/DL (ref 70–99)
GLUCOSE BLDC GLUCOMTR-MCNC: 115 MG/DL (ref 70–99)
GLUCOSE BLDC GLUCOMTR-MCNC: 118 MG/DL (ref 70–99)
GLUCOSE BLDC GLUCOMTR-MCNC: 126 MG/DL (ref 70–99)
GLUCOSE BLDC GLUCOMTR-MCNC: 160 MG/DL (ref 70–99)
GLUCOSE BLDC GLUCOMTR-MCNC: 161 MG/DL (ref 70–99)
GLUCOSE SERPL-MCNC: 110 MG/DL (ref 70–99)
HCT VFR BLD AUTO: 27.6 % (ref 40–53)
HGB BLD-MCNC: 8 G/DL (ref 13.3–17.7)
INR PPP: 3 (ref 0.86–1.14)
MAGNESIUM SERPL-MCNC: 1.9 MG/DL (ref 1.6–2.3)
MCH RBC QN AUTO: 26.2 PG (ref 26.5–33)
MCHC RBC AUTO-ENTMCNC: 29 G/DL (ref 31.5–36.5)
MCV RBC AUTO: 91 FL (ref 78–100)
PLATELET # BLD AUTO: 481 10E9/L (ref 150–450)
POTASSIUM SERPL-SCNC: 4.1 MMOL/L (ref 3.4–5.3)
RBC # BLD AUTO: 3.05 10E12/L (ref 4.4–5.9)
SODIUM SERPL-SCNC: 138 MMOL/L (ref 133–144)
SPECIMEN SOURCE: NORMAL
SPECIMEN SOURCE: NORMAL
WBC # BLD AUTO: 9.7 10E9/L (ref 4–11)

## 2020-03-19 PROCEDURE — 97116 GAIT TRAINING THERAPY: CPT | Mod: GP

## 2020-03-19 PROCEDURE — 85730 THROMBOPLASTIN TIME PARTIAL: CPT | Performed by: THORACIC SURGERY (CARDIOTHORACIC VASCULAR SURGERY)

## 2020-03-19 PROCEDURE — 85027 COMPLETE CBC AUTOMATED: CPT | Performed by: THORACIC SURGERY (CARDIOTHORACIC VASCULAR SURGERY)

## 2020-03-19 PROCEDURE — 25000132 ZZH RX MED GY IP 250 OP 250 PS 637: Mod: GY | Performed by: NURSE PRACTITIONER

## 2020-03-19 PROCEDURE — 36415 COLL VENOUS BLD VENIPUNCTURE: CPT | Performed by: THORACIC SURGERY (CARDIOTHORACIC VASCULAR SURGERY)

## 2020-03-19 PROCEDURE — 25000132 ZZH RX MED GY IP 250 OP 250 PS 637: Mod: GY | Performed by: STUDENT IN AN ORGANIZED HEALTH CARE EDUCATION/TRAINING PROGRAM

## 2020-03-19 PROCEDURE — 97535 SELF CARE MNGMENT TRAINING: CPT | Mod: GO | Performed by: OCCUPATIONAL THERAPIST

## 2020-03-19 PROCEDURE — 93750 INTERROGATION VAD IN PERSON: CPT | Performed by: NURSE PRACTITIONER

## 2020-03-19 PROCEDURE — 97112 NEUROMUSCULAR REEDUCATION: CPT | Mod: GP

## 2020-03-19 PROCEDURE — 25000132 ZZH RX MED GY IP 250 OP 250 PS 637: Mod: GY | Performed by: PHYSICIAN ASSISTANT

## 2020-03-19 PROCEDURE — 40000275 ZZH STATISTIC RCP TIME EA 10 MIN

## 2020-03-19 PROCEDURE — 85610 PROTHROMBIN TIME: CPT | Performed by: THORACIC SURGERY (CARDIOTHORACIC VASCULAR SURGERY)

## 2020-03-19 PROCEDURE — 21400000 ZZH R&B CCU UMMC

## 2020-03-19 PROCEDURE — 00000146 ZZHCL STATISTIC GLUCOSE BY METER IP

## 2020-03-19 PROCEDURE — 97110 THERAPEUTIC EXERCISES: CPT | Mod: GO | Performed by: OCCUPATIONAL THERAPIST

## 2020-03-19 PROCEDURE — 25000128 H RX IP 250 OP 636: Performed by: PHYSICIAN ASSISTANT

## 2020-03-19 PROCEDURE — 83735 ASSAY OF MAGNESIUM: CPT | Performed by: THORACIC SURGERY (CARDIOTHORACIC VASCULAR SURGERY)

## 2020-03-19 PROCEDURE — 25000132 ZZH RX MED GY IP 250 OP 250 PS 637: Mod: GY | Performed by: THORACIC SURGERY (CARDIOTHORACIC VASCULAR SURGERY)

## 2020-03-19 PROCEDURE — 97530 THERAPEUTIC ACTIVITIES: CPT | Mod: GP

## 2020-03-19 PROCEDURE — 80048 BASIC METABOLIC PNL TOTAL CA: CPT | Performed by: THORACIC SURGERY (CARDIOTHORACIC VASCULAR SURGERY)

## 2020-03-19 PROCEDURE — 99232 SBSQ HOSP IP/OBS MODERATE 35: CPT | Mod: 25 | Performed by: NURSE PRACTITIONER

## 2020-03-19 RX ORDER — METHOCARBAMOL 500 MG/1
500 TABLET, FILM COATED ORAL 4 TIMES DAILY PRN
Qty: 30 TABLET | Refills: 0 | Status: ON HOLD | OUTPATIENT
Start: 2020-03-19 | End: 2020-11-15

## 2020-03-19 RX ORDER — MAGNESIUM OXIDE 400 MG/1
400 TABLET ORAL DAILY
Qty: 30 TABLET | Refills: 1 | Status: ON HOLD | OUTPATIENT
Start: 2020-03-20 | End: 2022-01-01

## 2020-03-19 RX ORDER — POTASSIUM CHLORIDE 1500 MG/1
20 TABLET, EXTENDED RELEASE ORAL DAILY
Qty: 30 TABLET | Refills: 1 | Status: SHIPPED | OUTPATIENT
Start: 2020-03-20 | End: 2020-04-08

## 2020-03-19 RX ORDER — ALBUTEROL SULFATE 90 UG/1
2 AEROSOL, METERED RESPIRATORY (INHALATION) EVERY 6 HOURS PRN
Qty: 1 INHALER | Refills: 1 | Status: SHIPPED | OUTPATIENT
Start: 2020-03-19 | End: 2021-01-05

## 2020-03-19 RX ORDER — ATORVASTATIN CALCIUM 20 MG/1
20 TABLET, FILM COATED ORAL EVERY EVENING
Qty: 30 TABLET | Refills: 1 | Status: SHIPPED | OUTPATIENT
Start: 2020-03-19 | End: 2020-04-08

## 2020-03-19 RX ORDER — PANTOPRAZOLE SODIUM 40 MG/1
40 TABLET, DELAYED RELEASE ORAL
Qty: 30 TABLET | Refills: 1 | Status: SHIPPED | OUTPATIENT
Start: 2020-03-20 | End: 2020-04-08

## 2020-03-19 RX ORDER — WARFARIN SODIUM 4 MG/1
4 TABLET ORAL
Status: COMPLETED | OUTPATIENT
Start: 2020-03-19 | End: 2020-03-19

## 2020-03-19 RX ORDER — AMIODARONE HYDROCHLORIDE 200 MG/1
200 TABLET ORAL DAILY
Qty: 30 TABLET | Refills: 1 | Status: SHIPPED | OUTPATIENT
Start: 2020-03-20 | End: 2020-04-08

## 2020-03-19 RX ORDER — FUROSEMIDE 40 MG
40 TABLET ORAL DAILY
Qty: 30 TABLET | Refills: 1 | Status: SHIPPED | OUTPATIENT
Start: 2020-03-19 | End: 2020-04-02

## 2020-03-19 RX ORDER — MULTIPLE VITAMINS W/ MINERALS TAB 9MG-400MCG
1 TAB ORAL DAILY
Qty: 30 TABLET | Refills: 1 | Status: ON HOLD | OUTPATIENT
Start: 2020-03-20 | End: 2021-03-17

## 2020-03-19 RX ORDER — DIGOXIN 125 MCG
125 TABLET ORAL DAILY
Qty: 30 TABLET | Refills: 1 | Status: SHIPPED | OUTPATIENT
Start: 2020-03-20 | End: 2020-04-08

## 2020-03-19 RX ORDER — ACETAMINOPHEN 325 MG/1
650 TABLET ORAL EVERY 4 HOURS PRN
Qty: 100 TABLET | Refills: 0 | Status: ON HOLD | OUTPATIENT
Start: 2020-03-19 | End: 2021-03-17

## 2020-03-19 RX ORDER — ALLOPURINOL 100 MG/1
200 TABLET ORAL DAILY
Qty: 60 TABLET | Refills: 1 | Status: ON HOLD | OUTPATIENT
Start: 2020-03-20 | End: 2023-01-01

## 2020-03-19 RX ORDER — AMOXICILLIN 250 MG
1 CAPSULE ORAL 2 TIMES DAILY PRN
Qty: 30 TABLET | Refills: 0 | Status: SHIPPED | OUTPATIENT
Start: 2020-03-19 | End: 2020-04-08

## 2020-03-19 RX ORDER — HYDRALAZINE HYDROCHLORIDE 100 MG/1
100 TABLET, FILM COATED ORAL EVERY 8 HOURS
Qty: 90 TABLET | Refills: 1 | Status: SHIPPED | OUTPATIENT
Start: 2020-03-19 | End: 2020-04-02

## 2020-03-19 RX ORDER — LISINOPRIL 10 MG/1
10 TABLET ORAL DAILY
Qty: 30 TABLET | Refills: 1 | Status: SHIPPED | OUTPATIENT
Start: 2020-03-20 | End: 2020-04-08

## 2020-03-19 RX ADMIN — ATORVASTATIN CALCIUM 20 MG: 20 TABLET, FILM COATED ORAL at 20:18

## 2020-03-19 RX ADMIN — INSULIN ASPART 1 UNITS: 100 INJECTION, SOLUTION INTRAVENOUS; SUBCUTANEOUS at 11:58

## 2020-03-19 RX ADMIN — ALLOPURINOL 200 MG: 100 TABLET ORAL at 07:46

## 2020-03-19 RX ADMIN — FUROSEMIDE 40 MG: 40 TABLET ORAL at 07:46

## 2020-03-19 RX ADMIN — CEPHALEXIN 250 MG: 250 CAPSULE ORAL at 14:07

## 2020-03-19 RX ADMIN — FLUOXETINE 20 MG: 20 CAPSULE ORAL at 07:46

## 2020-03-19 RX ADMIN — CEPHALEXIN 250 MG: 250 CAPSULE ORAL at 21:19

## 2020-03-19 RX ADMIN — INSULIN ASPART 1 UNITS: 100 INJECTION, SOLUTION INTRAVENOUS; SUBCUTANEOUS at 21:23

## 2020-03-19 RX ADMIN — MAGNESIUM SULFATE 2 G: 2 INJECTION INTRAVENOUS at 07:52

## 2020-03-19 RX ADMIN — CEPHALEXIN 250 MG: 250 CAPSULE ORAL at 06:18

## 2020-03-19 RX ADMIN — AMIODARONE HYDROCHLORIDE 200 MG: 200 TABLET ORAL at 07:46

## 2020-03-19 RX ADMIN — Medication 200 MG: at 07:46

## 2020-03-19 RX ADMIN — HYDRALAZINE HYDROCHLORIDE 100 MG: 100 TABLET, FILM COATED ORAL at 21:19

## 2020-03-19 RX ADMIN — PANTOPRAZOLE SODIUM 40 MG: 40 TABLET, DELAYED RELEASE ORAL at 07:45

## 2020-03-19 RX ADMIN — POTASSIUM CHLORIDE 20 MEQ: 750 TABLET, EXTENDED RELEASE ORAL at 07:46

## 2020-03-19 RX ADMIN — HYDRALAZINE HYDROCHLORIDE 100 MG: 100 TABLET, FILM COATED ORAL at 06:18

## 2020-03-19 RX ADMIN — DIGOXIN 125 MCG: 125 TABLET ORAL at 07:46

## 2020-03-19 RX ADMIN — MAGNESIUM OXIDE 400 MG: 400 TABLET ORAL at 07:46

## 2020-03-19 RX ADMIN — ASPIRIN 81 MG CHEWABLE TABLET 81 MG: 81 TABLET CHEWABLE at 07:46

## 2020-03-19 RX ADMIN — WARFARIN SODIUM 4 MG: 4 TABLET ORAL at 17:36

## 2020-03-19 RX ADMIN — HYDRALAZINE HYDROCHLORIDE 100 MG: 100 TABLET, FILM COATED ORAL at 14:07

## 2020-03-19 RX ADMIN — LISINOPRIL 10 MG: 10 TABLET ORAL at 07:46

## 2020-03-19 RX ADMIN — MULTIPLE VITAMINS W/ MINERALS TAB 1 TABLET: TAB at 07:46

## 2020-03-19 ASSESSMENT — ACTIVITIES OF DAILY LIVING (ADL)
ADLS_ACUITY_SCORE: 16

## 2020-03-19 NOTE — PROGRESS NOTES
CLINICAL NUTRITION SERVICES - REASSESSMENT NOTE     Nutrition Prescription    RECOMMENDATIONS FOR MDs/PROVIDERS TO ORDER:  None at this time.     Malnutrition Status:    Patient does not meet two of the established criteria necessary for diagnosing malnutrition    Recommendations already ordered by Registered Dietitian (RD):  Modified oral supplement order    Future/Additional Recommendations:  1. Continue current diet, as ordered. If oral intake decreases, then rec liberalize diet order.   2. Monitor potential need for a fluid restriction.  3. Continue multivitamin with minerals, as ordered, to help ensure micronutrient needs are met.   4. Monitor BG control. Hgb A1c of 7.3 on 2/8/20. DM II hx.      EVALUATION OF THE PROGRESS TOWARD GOALS   Diet: Low Saturated Fat/2400 mg Sodium. Pt has an order for Gelatein at 10:00 and HS and is ordered to receive strawberry Boost Plus at 14:00. Has a prn order for cottage cheese with meals.  Intake: Good diet tolerance. Flowsheets indicate pt is consuming 100% of meals consistently with a good appetite. [employer] (meal ordering system) indicates food/beverages sent 3/17-3/18 totaled 1831 kcals and 122 g protein daily on average. This meets increased estimated needs below. Per discussion with pt, reports good oral intake and states he is eating adequately. Finishing his meals. Likes the strawberry Boost Plus oral supplements and states he likes the Gelatein except for the pineapple flavor.      NEW FINDINGS   Pt states he feels he is gaining strength.    ASSESSED NUTRITION NEEDS (updated)  Dosing weight: 73 kg (adjusted, based on lowest wt this admission of 89 kg on 3/17/20)  Estimated Energy Needs: 0047-4893 kcals/day (20 - 25 kcals/kg)  Justification: Maintenance needs with body wt status  Estimated Protein Needs: 110-146 grams protein/day (1.5 - 2 grams of pro/kg)  Justification: Increased needs post-op LVAD  Estimated Fluid Needs: 1324-7857 mL/day (1 mL/kcal)    Justification: Maintenance needs or per team, pending fluid status    MALNUTRITION  % Intake: No decreased intake noted  % Weight Loss: Difficult to assess with post-op status and diuresis.  Subcutaneous Fat Loss: None observed per previous RD and brief visualization today, 3/19.  Muscle Loss: None observed per previous RD and brief visualization today, 3/19.  Fluid Accumulation/Edema: Trace  Malnutrition Diagnosis: Patient does not meet two of the established criteria necessary for diagnosing malnutrition    Previous Goals   Patient to consume % of nutritionally adequate meal trays TID, or the equivalent with supplements/snacks.  Evaluation: Meeting    Previous Nutrition Diagnosis  Predicted inadequate nutrient intake (kcal/protein) related to increased nutrition needs post-LVAD and potential for intake adequacy to decline pending LOS -vs- medical course.   Evaluation: Unresolved. Updated below.     CURRENT NUTRITION DIAGNOSIS  Predicted inadequate nutrient intake (kcal/protein) related to LOS and increased nutrition needs post-LVAD.    INTERVENTIONS  Implementation  Medical food supplement therapy: Discussed oral supplements. Will continue current supplements, as ordered, except removed the option for pineapple Gelatein.   Nutrition education for nutrition relationship to health/disease: Reviewed pt's high protein needs for six to weight weeks post-op. Explained recommendation for 110 g protein/day. Pt states he feels he is in good shape nutritionwise as his wife is a dietitian. Provided room service menu and handout that depicts protein amounts in various foods and beverages.     Goals  Patient to consume % of nutritionally adequate meal trays TID, or the equivalent with supplements/snacks.    Monitoring/Evaluation  Progress toward goals will be monitored and evaluated per protocol.     Nutrition will continue to follow.      Abby Woods, MS, RD, LD, [facility]   6C Pgr: [phone]

## 2020-03-19 NOTE — PLAN OF CARE
D: Admitted 2/06 for advanced heart failure options, s/p LVAD HM 3 on 2/18 c/b hemothorax s/p washout 3/6 and ICD placement 3/16. Hx of chronic systolic heart failure, NICM, moderate CAD, HTN, ABHINAV on CPAP, DM2 and CKD3    I: Monitored vitals and assessed pt status.   Changed:  Mg of 1.9 replaced per protocol, recheck in AM  PT/OT recommending pt discharge home, possibly tomorrow per CVTS    PRN:  Tylenol for incisional pain    A: VSS, A&O, up SBA. Pt denies pain throughout shift. ICD site mildly swollen, improved with ice packs. Oxygen stable on RA, wears CPAP at night. Tele Aflutter. Good urine output, (+) for BM.     Temp:  [98.1  F (36.7  C)-98.7  F (37.1  C)] 98.1  F (36.7  C)  Heart Rate:  [] 106  Resp:  [16-20] 16  BP: ()/(70-77) 98/75  SpO2:  [96 %-99 %] 98 %      P: Discharge to [facility] with wife tomorrow. Will continue to monitor Pt status and report changes to treatment team.

## 2020-03-19 NOTE — PLAN OF CARE
Discharge Planner OT   Patient plan for discharge: ARU  Current status: pt ambulating in hallway greater than 1000 feet in 2 bouts  by 20 min on Nu-step VSS on RA  Barriers to return to prior living situation: deconditioning, post op precautions.   Recommendations for discharge: home to   Rationale for recommendations: pt below baseline in ADL I however has sufficient support from SO and progressing well in therapy this hospital stay.        Entered by: Betito Day 03/19/2020 12:34 PM

## 2020-03-19 NOTE — PROGRESS NOTES
CARDIOTHORACIC SURGERY PROGRESS NOTE  03/19/2020      SUBJECTIVE:    No acute issues over night.   Pain is well controlled. Does report some pain and swelling in around ICD, stable and controlled. Reports breathing is okay, no SOB.   Patient denies CP, fever, chills, sweats, lightheadedness, dizziness.   Patient has been tolerating diet. + BM. + flatus.    OBJECTIVE:   Temp:  [98.4  F (36.9  C)-98.7  F (37.1  C)] 98.6  F (37  C)  Pulse:  [95] 95  Heart Rate:  [63-99] 83  Resp:  [16-20] 16  BP: ()/(70-77) 97/77  SpO2:  [95 %-99 %] 99 %    Gen: NAD, sitting up in chair  CV: LVAD hum present  Pulm: diminished bases  Abd: soft, non-tender, no guarding, +BS  Ext: trace bilateral lower extremity edema  Incision: clean, dry, intact, no erythema. Dressing c/d to left ICD site, mild swelling, no hematoma  Chest Tube sites: dressings clean and dry  Neuro: grossly normal  Psych: calm, cooperative     I&O's:  I/O last 3 completed shifts:  In: 836 [P.O.:836]  Out: 1300 [Urine:1300]    Labs:   BMP  Recent Labs   Lab 03/19/20  0529 03/18/20  0553 03/17/20  0508 03/16/20  0526    137 138 136   POTASSIUM 4.1 4.0 4.2 4.0   CHLORIDE 103 102 103 103   CALOS 8.3* 8.3* 8.1* 8.4*   CO2 29 29 29 28   BUN 26 23 25 25   CR 1.08 1.14 1.04 1.08   * 109* 120* 114*     CBC  Recent Labs   Lab 03/19/20  0529 03/18/20  0553 03/17/20  0508 03/16/20  0526   WBC 9.7 9.1 10.2 9.9   RBC 3.05* 3.17* 3.12* 3.05*   HGB 8.0* 8.3* 8.2* 8.1*   HCT 27.6* 29.1* 28.9* 28.0*   MCV 91 92 93 92   MCH 26.2* 26.2* 26.3* 26.6   MCHC 29.0* 28.5* 28.4* 28.9*   RDW 17.2* 17.2* 17.4* 17.3*   * 484* 466* 379     INR  Recent Labs   Lab 03/19/20  0529 03/18/20  0553 03/17/20  0508 03/16/20  0526   INR 3.00* 3.32* 3.54* 3.15*      Hepatic Panel No lab results found in last 7 days.  Glucose  Recent Labs   Lab 03/19/20  0708 03/19/20  0620 03/19/20  0529 03/19/20  0235 03/18/20  2158 03/18/20  1621 03/18/20  1149  03/18/20  0553  03/17/20  0508   03/16/20  0526  03/15/20  0655  03/14/20  0524   GLC  --   --  110*  --   --   --   --   --  109*  --  120*  --  114*  --  108*  --  115*   * 113*  --  126* 126* 137* 173*   < >  --    < >  --    < >  --    < >  --    < >  --     < > = values in this interval not displayed.       Imaging:   Recent Results (from the past 24 hour(s))   XR Chest 2 Views    Narrative    PA and lateral chest    HISTORY: Evaluate right effusion    COMPARISON STUDY: 3/17/2020    FINDINGS: Cardiac silhouette is not enlarged. LVAD in place. Left  transvenous single lead implantable cardiac defibrillator. The fluid  trapped in the right major and minor fissure. Lung volumes are low.  Surgical clips left upper quadrant.      Impression    IMPRESSION: Unchanged loculated right pleural effusion within the  right major /minor fissures.    QUIQUE NICHOLS MD         ASSESSMENT/PLAN: Patient is a 64 year old male with a history of chronic systolic heart failure, NICM, moderate CAD, HTN, ABHINAV on CPAP, DM2, CKDIII who is transferred to [facility] for evaluation and management of advanced heart failure with arrhythmia now s/p HM 3 on 2/18 with Dr. Jaramillo. 3/4 had hemothorax requiring a chest tube and to OR for VATS washout of right hemothorax on 3/6.      Neuro:   # Postop Pain  - Neuro intact  - Tylenol and oxycodone   # Depression/anxiety  - Fluoxetine 20 mg daily     CV:   # Endstage NICM S/P LVAD HM3  # RV dysfunction  - TTE 02/25 @5600 RPM, septum midline, mod-severe RV dysfunction, aortic valve closed. Current speed 5500  - Digoxin 125 mcg daily for RV support  - MAPs 70s-80s. Lisinopril 10 mg daily, hydralazine 100 mg q8H. Avoid BB with RV dysfunction.   - Hypervolemic, improving. Lasix IV 40 03/13. Lasix PO 40 mg daily  - Aspirin on hold, will review with Dr. Jaramillo now that chest tube removed, bival off  - Warfarin to CFX goal 20-40. Therapeutic. Discontinue CFX, INR therapeutic off bival  - Right pleural chest tubes removed 03/16  # Atrial  fibrillation  # VT  - EP placed ICD 3/17, ICD check shows normal function  - S/p DCCV 02/28  - Amiodarone 200 mg daily     Pulm:   # Postop mechanical ventilation, resolved  - Extubated POD 2 02/20  - Pulm toilet, IS, activity and deep breathing   - Supplemental O2 PRN to keep sats > 92%.   # ABHINAV  - Home CPAP  # Right Hemothorax  - Chest tube placed 03/04, removed 3/15, f/u CXR unremarkable  - VATS 3/6, extensive clot burden removed, CXR 3/17 with slightly increased effusion, recheck 3/18 stable, patient asymptomatic, continue diuresis     ID:   # Proteus and enterococcus bacteremia  - Organisms growing from 2/2 blood cultures 2/13  - ID consulted, now following peripherally  ampicillin 2 grams q 6 hrs (14 days after removal of all central venous catheters (2/26 - 3/11))   - ID recommends surveillance cultures every week x 4 weeks after stopping all antibiotics (note on 2/25/20). Repeat cultures for surveillance ordered 3/13, NGTD.   - WBC WNL, afebrile, no signs or symptoms of infection      GI / FEN:  # Nutrition  - Reg diet, bowel regimen     Renal / :   # CKD stage 3  - Creatinine WNL, trend   - Diuresis as above  - Avoid nephroxtoxins   # Gout  - Allopurinol 200 mg daily     Heme:  # Acute postop blood loss anemia  - Hgb and Plts stable  - Transfuse PRN  # ANA  - Completed plasmapheresis preop   - Warfarin as above     Endo:   # DMT2  # Stress hyperglycemia  - Lantus 10 unit(s) at bedtime plus SSI     PPX:   - GI: Protonix  - DVT: warfarin     Dispo:   - 6C since 3/9  - Anticipate DC to TCU per PT/OT pending ICD placement, medically ready as of 3/17, waiting for TCU bed. May progress to home with OP cardiac rehab    Staff surgeons have been informed of changes through both verbal and written communication.         Mono Gambino PA-C  Cardiothoracic Surgery  p: [phone]

## 2020-03-19 NOTE — PROGRESS NOTES
LVAD Social Work Service Discharge Note      Patient Name:  Eliseo Tanner     Anticipated Discharge Date:  3/20/2020 (Friday)    Discharge Disposition:   Other:  [facility]    Plan for 24 hour care for immediate post LVAD period: Pt's wife has completed VAD education and will be pt's 24hr caregiver for 30 days.     If not local, plans for short term stay:  [facility]    Additional Services/Equipment Arranged:  Pt's wife had questions regarding LVAD supplies. Pt will be given 7 days of supplies and in the meantime she has to call company to begin shipments for future dressing supplies.      Persons notified of above discharge plan:  CVTS, VAD coordinator, bedside RN, RNCC, pt and wife    Patient / Family response to discharge plan:  Pt and wife are eager to have pt discharge tomorrow.     Education and resources provided by SW at discharge: role of LVAD  in out patient setting and provided contact info for , availability of support groups, reinforced caregiver plan      Plan:     Pt feels comfortable with discharge tomorrow to [facility]. Plan had been for pt to go to  TCU, but he has progressed with therapies and feels safe discharging directly to the [facility] with his wife as his caregiver. There are no mental health or coping concerns.     Wife is in agreement with plan. She would like to review discharge paperwork with RN tomorrow by phone and will need to coordinate when she should come and pick-up pt at front entrance.

## 2020-03-19 NOTE — PROGRESS NOTES
[facility]   Cardiology II Service / Advanced Heart Failure  Daily Progress Note  Date of Service: 3/19/2020      Patient: Eliseo Tanner  MRN: [id number]  Admission Date: 2/6/2020  Hospital Day # 42    Assessment and Plan: Eliseo Tanner is a 64 year old male with chronic systolic heart failure secondary to NICM, moderate CAD, HTN, ABHINAV on CPAP, DM2, and CKD Stage III. He was transferred to [facility] for evaluation and management of advanced heart failure with arrhythmia now s/p HM 3 on 2/18. Cardiology consult for volume management.     Chronic SCHF secondary to NICM s/p HM III with RV dysfunction. Implanted 2/18 and complicated by bleeding. Echo at 5600 rpm notes mild-moderate RV dysfunction with AV closed and trace AI, current speed 5500 rpm.   Stage D, NYHA Class IIIB  ACEi/ARB Lisinopril 10 mg po daily. Hydralazine 100 mg po TID.   BB Defer s/p LVAD  Aldosterone antagonist deferred while other medical therapy is prioritized  SCD prophylaxis ICD implanted 3/16.`  Fluid status euvolemic. Lasix 40 mg po daily   MAP: 83.5  LDH: 294 2/18   Anticoagulation: Coumadin per pharmacy. INR-3.0, Goal 2-3.  Antiplatelet: ASA 81 mg po daily  - Continue Digoxin for RV support.      The patient's HeartMate LVAD was interrogated 3/19/2020  * Speed 5500 rpm   * Pulsatility index 3.3  * Power 4.6 Driver   * Flow 4.6 L/minute   Fluid status: Euvolemic   Alarms were reviewed, and negative for acute events.   The driveline exit site was covered with dressing clean, dry, and intact.   All external components were inspected and showed no evidence of damage or malfunction.     HTN.   - MAP 83.5 today.   - Continue Lisinopril and Hydralazine as above.      New Onset Afib s/p DCCV/history of Aflutter. ECG consistent with Afib w/ RVR and aberrancy vs. NATHALIA vs. AT vs. Slow VT. S/p DCCV on 2/28 back into sinus rhythm.  - Coumadin as above.   - Continue Digoxin.      Retrosternal Hematoma. CT chest 2/26: measuring 5.1 cm.  "S/P chest tube per CVTS 3/5, d/c'ed 3/15. Chest X-ray 3/18 with stable fluid to right interlobular fissure, Hgb stable.   - Hgb stable. Management per CVTS.      Chronic/Resolved Medical Conditions:  CAD. Continue ASA and statin. BB deferred s/p LVAD.  Proteus and Enterococcus Bacteremia, resolved. Blood cx 2/13 positive 2/2. Blood cultures 2/15 +1/2 S.epi, likely contaminant per ID. Zosyn (2/13-2/14) Tobramycin (2/13-2/14) Vancomycin (2/13-2/17) Meropenem (2/14-2/15) Ceftriaxone (2/16-2/28) -Ampicillin (2/16-3/11) (14 days after swan removal on 2/26).Blood cx negative since 2/15, ordered [id number] 3/15 NTD.   Thrombocytopenia. HIT positive DAVINA negative. Antibody negative x2, thus no further indication for PLEX. Heme consulted. Continue Coumadin as above.   VF s/p external shock. Continue Amio, s/p ICD 3/16.   ================================================================    Interval History/ROS: he is feeling well and eager to discharge. He denies fever, chills, chest pain, palpitations, cough, nausea, vomiting, diarrhea, hematochezia, hematemesis, and LE edema. He is tolerating therapy and oral intake.     Last 24 hr care team notes reviewed.   ROS:  4 point ROS including Respiratory, CV, GI and , other than that noted in the HPI, is negative.     Medications: Reviewed in EPIC.     Physical Exam:   BP 98/75 (BP Location: Right arm)   Pulse 95   Temp 98.1  F (36.7  C) (Oral)   Resp 16   Ht 1.702 m (5' 7\")   Wt 89.2 kg (196 lb 10.4 oz)   SpO2 98%   BMI 30.80 kg/m    GENERAL: Appears alert and oriented times three.   HEENT: Eye symmetrical and free of discharge bilaterally. Mucous membranes moist and without lesions.  NECK: Supple and without lymphadenopathy. JVD 8 cm   CV: RRR, S1S2 present with LVAD hum  RESPIRATORY: Respirations regular, even, and unlabored. Lungs CTA throughout.   GI: Soft and non distended with normoactive bowel sounds present in all quadrants. No tenderness, rebound, guarding. No " organomegaly.   EXTREMITIES: Trace bilateral LE peripheral edema. 2+ bilateral pedal pulses.   NEUROLOGIC: Alert and orientated x 3. CN II-XII grossly intact. No focal deficits.   MUSCULOSKELETAL: No joint swelling or tenderness.   SKIN: No jaundice. No rashes or lesions. LVAD drive line covered.     Data:  CMP  Recent Labs   Lab 03/19/20  0529 03/18/20  0553 03/17/20  0508 03/16/20  0526    137 138 136   POTASSIUM 4.1 4.0 4.2 4.0   CHLORIDE 103 102 103 103   CO2 29 29 29 28   ANIONGAP 6 6 6 5   * 109* 120* 114*   BUN 26 23 25 25   CR 1.08 1.14 1.04 1.08   GFRESTIMATED 71 66 74 71   GFRESTBLACK 82 77 86 82   CALOS 8.3* 8.3* 8.1* 8.4*   MAG 1.9 2.1 2.2 1.9     CBC  Recent Labs   Lab 03/19/20  0529 03/18/20  0553 03/17/20  0508 03/16/20  0526   WBC 9.7 9.1 10.2 9.9   RBC 3.05* 3.17* 3.12* 3.05*   HGB 8.0* 8.3* 8.2* 8.1*   HCT 27.6* 29.1* 28.9* 28.0*   MCV 91 92 93 92   MCH 26.2* 26.2* 26.3* 26.6   MCHC 29.0* 28.5* 28.4* 28.9*   RDW 17.2* 17.2* 17.4* 17.3*   * 484* 466* 379     INR  Recent Labs   Lab 03/19/20  0529 03/18/20  0553 03/17/20  0508 03/16/20  0526   INR 3.00* 3.32* 3.54* 3.15*       Patient discussed with Dr. Cisneros.      Laine Leon FNP  3/19/2020

## 2020-03-19 NOTE — DISCHARGE SUMMARY
[facility]   Cardiothoracic Surgery Hospital Discharge Summary       Eliseo Tanner MRN# [id number]   YOB: 1955 Age: 64 year old     Date of Admission:  2/6/2020  Date of Discharge::  3/20/2020  Admitting Physician:  Nader Cisneros MD  Discharge Physician:  Mono Gambino PA-C   Primary Care Physician:        Jay Steele          Admission Diagnoses:   Chronic systolic heart failure  Non-ischemic cardiomyopathy  Moderate CAD  Hypertension  ABHINAV  Type II diabetes          Discharge Diagnosis:   Chronic systolic heart failure - s/p LVAD  Non-ischemic cardiomyopathy  Moderate CAD  Proteus and enterococcus bacteremia  Right hemothorax  Atrial fibrillation - s/p DCCV 2/28  Ventricular tachycardia - s/p ICD 3/17  RV dysfunction  Hypertension  ABHINAV  Type II diabetes         Procedures:   2/18/2020 Dr. Mac Jaramillo and Dr. Jamil Apodaca:  Placement of HeartMate 3 left ventricular assist device (intracorporeal left ventricular device).     3/6/2020 Dr. Mac Jaramillo and Dr. William Gan:  1) Right VATS  2) Evacuation of right hemothorax  3) Placement of chest tubes        Non-operative procedures:   ICD placement 3/16/2020          Consultations:   CARDIOTHORACIC SURGERY ADULT IP CONSULT  PALLIATIVE CARE ADULT IP CONSULT  NEUROPSYCHOLOGY IP CONSULT  CORE CLINIC EVALUATION IP CONSULT  SOCIAL WORK IP CONSULT  PHARMACY IP CONSULT  PHARMACY IP CONSULT  DENTISTRY ADULT IP CONSULT  INFECTIOUS DISEASE GENERAL ADULT IP CONSULT  PHARMACY TO DOSE TOBRAMYCIN  NEPHROLOGY ICU IP CONSULT  VASCULAR ACCESS CARE ADULT IP CONSULT  HEMATOLOGY ADULT IP CONSULT  APHERESIS TRANSFUSION ADULT/PEDS IP CONSULT  CARDIAC REHAB IP CONSULT  PHYSICAL THERAPY ADULT IP CONSULT  OCCUPATIONAL THERAPY ADULT IP CONSULT  PHARMACY IP CONSULT  PHARMACY IP CONSULT  SWALLOW EVAL SPEECH PATH AT BEDSIDE IP CONSULT  PHARMACY IP CONSULT  PHARMACY TO DOSE WARFARIN  VASCULAR ACCESS CARE ADULT IP  CONSULT  CARDIOLOGY ELECTROPHYSIOLOGY (EP) IP CONSULT  THORACIC SURGERY ADULT IP CONSULT  PHARMACY TO DOSE WARFARIN  LYMPHEDEMA THERAPY IP CONSULT  VASCULAR ACCESS CARE ADULT IP CONSULT          Brief History of Illness:   Eliseo Tanner is a 64 year old male with a past medical history of chronic systolic heart failure secondary to NICM, moderate CAD, hypertension, ABHINAV on CPAP, type II diabetes mellitus, CKD stage III who transferred to [facility] for evaluation of advance heart failure. Patient underwent work-up for advanced therapies. Pre-operatively, patient was found to have proteus and enterococcus bacteremia treated with multiple antibiotics, but finally a course of ceftriaxone and ampicillin.            Hospital Course:   Eliseo Tanner is a 64 year old male who on 2/18/2020 underwent the above-named procedures.  Patient tolerated the operation well and was admitted to the CVICU post-operatively.  Patient was extubated on POD # 2.  Pressors were weaned off as able. Patient's ICU stay was remarkable for RV dysfunction requiring dobutamine, VT requiring external shock and atrial fibrillation requiring DCCV cardioversion on 2/26/2020. Patient was transferred to the surgical floor on 2/27/2020 .     Patient continued to improve post-operatively. Patient was started on ASA, coumadin with INR goal 2-3, bridged with bivalirudin. Patient was started on lisinopril 10 mg daily and hydralazine 100 mg TID for afterload reduction. Dobutamine was weaned and patient was started on digoxin for RV support. Patient was diuresed with IV lasix to his dry weight and will be discharged on lasix 40 mg PO daily. Patient had VT requiring external shock while in ICU. Patient was started on amiodarone, discharged on 200 mg daily, and an ICD was placed on 3/16/2020 prior to discharge.     Chest tubes were removed when drainage decreased. Patient subsequently developed a large right hemothorax requiring emergent chest tube  placement on 3/4/2020. He was transferred to the ICU and later taken to the OR on 3/6/2020 for VATS hemathorax evacuation of extensive clot. He transferred back to the floor on 3/9/2020. Patient left pleural chest tube was removed on 3/15. Follow-up chest x-ray showed a small effusion in the fissure that remained stable at discharge.     Proteus and enterococcus bacteremia - from blood cultures 2/13 (pre-op). ID consulted, now following peripherally. Treated with ampicillin 2 grams q 6 hrs for 14 days after removal of all central venous catheters (2/26 - 3/11). ID recommended surveillance cultures every week for 4 weeks after stopping all antibiotics (see note on 2/25/20). Last culture drawn 3/13 with no growth. WBC remained WNL and afebrile at time of discharge     ANA - Completed plasmapheresis preop, warfarin bridged with bivalirudin, INR goal 2-3     Type II diabetes mellitus - holding PTA glipizide. Continue lantus 10 units     Post-operative pain control included IV dilaudid, PO oxycodone and tylenol and will be discharged on tylenol and robaxin.     Prior to discharge, patient's pain was controlled well, he was able to perform most ADLs and ambulate without difficulty, and had full return of bowel and bladder function.  On March 20, 2020, he was Discharged to home in stable condition.      Day of Discharge Exam   Vitals:  Temp:  [98.2  F (36.8  C)-98.9  F (37.2  C)] 98.7  F (37.1  C)  Pulse:  [80] 80  Heart Rate:  [64-84] 64  Resp:  [16-18] 18  BP: (85-96)/(66-78) 92/71  SpO2:  [95 %-98 %] 95 %  Wt: 195 lbs 6.4 oz, 88 kg   Physical Exam:   Gen: A&Ox4, NAD, conversational  CV: LVAD hum present, no pump alarms or PI events  Pulm: Lungs CTA, no rhonchi or wheezes  Abd: +BS, Soft, nondistended, NTTP  Ext: No lower extremity edema  Neuro: CN II-XII intact, no focal deficits  Incision: clean, dry, intact, no erythema. Dressing c/d to left ICD site, mild swelling, no hematoma  Chest Tube sites: Dressings clean and  dry  Lines:  Drive line dressing clean and dry    Labs:   BMP  Recent Labs   Lab 03/20/20  0530 03/19/20  0529 03/18/20  0553 03/17/20  0508    138 137 138   POTASSIUM 4.3 4.1 4.0 4.2   CHLORIDE 101 103 102 103   CO2 31 29 29 29   BUN 24 26 23 25   CR 1.05 1.08 1.14 1.04   * 110* 109* 120*   MAG 2.2 1.9 2.1 2.2     CBC  Recent Labs   Lab 03/20/20  0530 03/19/20  0529 03/18/20  0553 03/17/20  0508   WBC 10.4 9.7 9.1 10.2   RBC 3.15* 3.05* 3.17* 3.12*   HGB 8.3* 8.0* 8.3* 8.2*   HCT 28.4* 27.6* 29.1* 28.9*   MCV 90 91 92 93   MCH 26.3* 26.2* 26.2* 26.3*   MCHC 29.2* 29.0* 28.5* 28.4*   RDW 17.4* 17.2* 17.2* 17.4*   * 481* 484* 466*     INR  Recent Labs   Lab 03/20/20  0530 03/19/20  0529 03/18/20  0553 03/17/20  0508   INR 2.84* 3.00* 3.32* 3.54*      Hepatic Panel No lab results found in last 7 days.         Imaging Studies:   CXR 3/18/2020:  Cardiac silhouette is not enlarged. LVAD in place. Left  transvenous single lead implantable cardiac defibrillator. The fluid  trapped in the right major and minor fissure. Lung volumes are low.  Surgical clips left upper quadrant.                                                                      IMPRESSION: Unchanged loculated right pleural effusion within the  right major /minor fissures.    CXR 3/9/2020:  1.  Stable supportive lines and tubes.  2.  Cardiomegaly with LVAD placement, stable.  3.  Right apical hemothorax evacuation. Right mid/lower lobe  subsegmental atelectasis, overall stable.    CT Chest 3/4/2020:  1. Grossly stable large right apical hemothorax compared to recent  radiographs. Tiny nondisplaced fractures of the medial right first and  second ribs. Tiny right pneumothorax.   2. Intrafissural position of the right basilar chest tube abutting the  pericardium and with the tip immediately inferior to the right hilar  vasculature.   3. Small left pleural effusion, which also contains hyperdense fluid  suspicious for small component of  left-sided hemothorax.  4. Stable retrosternal hematoma.  5. Cardiomegaly with small pericardial effusion versus  hemopericardium. Stable LVAD and pericardial drain.    Echo 2/25/2020:  Left ventricular size is normal. Severely reduced left ventricular systolic  function. Septum is midline  LVAD inflow or outflow cannulae not visualized. Doppler velocities are not  elevated.  Moderate to severely reduced right ventricular systolic function.  Aortic valve appears closed during the entire cardiac cycle. Trace aortic  insufficiency is present.  IVC is plethoric.  No pericardial effusion present.    Echo 2/10 (pre-LVAD):  Interpretation Summary  Mild left ventricular dilation. The visually estimated LVEF is 10-15%.  Moderate right ventricular dilation with moderately reduced global right  ventricular function is moderately reduced.  Estimated pulmonary artery systolic pressure is 29 mmHg plus right atrial  pressure.  IVC diameter >2.1 cm collapsing <50% with sniff suggests a high RA pressure  estimated at 15 mmHg or greater.     This study was compared with the study from 2/7/20. Minimal change in  biventricular function.      Final Pathology/Culture Result:   Surgical pathology 2/18/2020:  Heart, core of left ventricle:   - Focal myocardial hypertrophy with myocytolysis   - Focal replacement fibrosis and fatty metaplasia     Blood cultures 2/22 and 3/13/2020: NG    Blood cultures 2/13-2/14: Proteus and enterococcus      Drains/tubes Present at Discharge   None         Medications Prior to Admission:     Medications Prior to Admission   Medication Sig Dispense Refill Last Dose     insulin pen needle (BD JAIME U/F) 32G X 4 MM miscellaneous         MULTIPLE MINERALS-VITAMINS PO Take 1 tablet by mouth daily        ondansetron (ZOFRAN) 4 MG tablet Take 4 mg by mouth every 4 hours as needed for nausea        [DISCONTINUED] acetaminophen (TYLENOL) 500 MG tablet Take 1,000 mg by mouth daily as needed        [DISCONTINUED]  albuterol (PROAIR HFA/PROVENTIL HFA/VENTOLIN HFA) 108 (90 Base) MCG/ACT inhaler Inhale 2 puffs into the lungs every 4 hours as needed        [DISCONTINUED] allopurinol (ZYLOPRIM) 100 MG tablet Take 200 mg by mouth daily        [DISCONTINUED] aspirin 81 MG EC tablet Take 81 mg by mouth daily        [DISCONTINUED] atorvastatin (LIPITOR) 20 MG tablet Take 20 mg by mouth daily        [DISCONTINUED] coenzyme Q-10 200 MG CAPS Take 1 capsule by mouth daily        [DISCONTINUED] FLUoxetine (PROZAC) 20 MG capsule Take 20 mg by mouth daily        [DISCONTINUED] furosemide (LASIX) 40 MG tablet Take 40 mg by mouth daily        [DISCONTINUED] glipiZIDE (GLUCOTROL XL) 2.5 MG 24 hr tablet Take 2.5 mg by mouth daily        [DISCONTINUED] insulin glargine (BASAGLAR KWIKPEN) 100 UNIT/ML pen Inject 15 Units Subcutaneous At Bedtime        [DISCONTINUED] magnesium oxide (MAG-OX) 400 MG tablet Take 400 mg by mouth daily        [DISCONTINUED] omeprazole (PRILOSEC) 20 MG DR capsule Take 20 mg by mouth every morning        [DISCONTINUED] sacubitril-valsartan (ENTRESTO)  MG per tablet Take 1 tablet by mouth 2 times daily        [DISCONTINUED] spironolactone (ALDACTONE) 25 MG tablet Take 25 mg by mouth daily              Discharge Medications:        Review of your medicines      START taking      Dose / Directions   amiodarone 200 MG tablet  Commonly known as:  PACERONE  Used for:  LVAD (left ventricular assist device) present (H)      Dose:  200 mg  Take 1 tablet (200 mg) by mouth daily  Quantity:  30 tablet  Refills:  1     * cephalexin 500 MG tablet  Used for:  Cardiac arrest (H)      Dose:  500 mg  Take 500 mg by mouth 3 times daily for 5 days  Quantity:  15 tablet  Refills:  0     * cephALEXin 500 MG capsule  Commonly known as:  KEFLEX  Used for:  LVAD (left ventricular assist device) present (H)      Dose:  500 mg  Take 1 capsule (500 mg) by mouth 3 times daily for 5 days  Quantity:  15 capsule  Refills:  0     diclofenac 1 %  topical gel  Commonly known as:  VOLTAREN  Used for:  LVAD (left ventricular assist device) present (H)      Dose:  4 g  Apply 4 g topically 4 times daily as needed for moderate pain  Quantity:  1 Tube  Refills:  1     digoxin 125 MCG tablet  Commonly known as:  LANOXIN  Used for:  LVAD (left ventricular assist device) present (H)      Dose:  125 mcg  Take 1 tablet (125 mcg) by mouth daily  Quantity:  30 tablet  Refills:  1     hydrALAZINE 100 MG tablet  Commonly known as:  APRESOLINE  Used for:  LVAD (left ventricular assist device) present (H)      Dose:  100 mg  Take 1 tablet (100 mg) by mouth every 8 hours  Quantity:  90 tablet  Refills:  1     lisinopril 10 MG tablet  Commonly known as:  ZESTRIL  Used for:  LVAD (left ventricular assist device) present (H)      Dose:  10 mg  Take 1 tablet (10 mg) by mouth daily  Quantity:  30 tablet  Refills:  1     methocarbamol 500 MG tablet  Commonly known as:  ROBAXIN  Used for:  LVAD (left ventricular assist device) present (H)      Dose:  500 mg  Take 1 tablet (500 mg) by mouth 4 times daily as needed for muscle spasms  Quantity:  30 tablet  Refills:  0     multivitamin w/minerals tablet  Used for:  LVAD (left ventricular assist device) present (H)      Dose:  1 tablet  Take 1 tablet by mouth daily  Quantity:  30 tablet  Refills:  1     pantoprazole 40 MG EC tablet  Commonly known as:  PROTONIX  Used for:  LVAD (left ventricular assist device) present (H)      Dose:  40 mg  Take 1 tablet (40 mg) by mouth every morning (before breakfast)  Quantity:  30 tablet  Refills:  1     potassium chloride ER 20 MEQ CR tablet  Commonly known as:  KLOR-CON M  Used for:  LVAD (left ventricular assist device) present (H)      Dose:  20 mEq  Take 1 tablet (20 mEq) by mouth daily  Quantity:  30 tablet  Refills:  1     senna-docusate 8.6-50 MG tablet  Commonly known as:  SENOKOT-S/PERICOLACE  Used for:  LVAD (left ventricular assist device) present (H)      Dose:  1 tablet  1 tablet by Oral  or Feeding Tube route 2 times daily as needed for constipation  Quantity:  30 tablet  Refills:  0     warfarin ANTICOAGULANT 4 MG tablet  Commonly known as:  COUMADIN  Used for:  LVAD (left ventricular assist device) present (H)      Take as directed. If you are unsure how to take this medication, talk to your nurse or doctor.  Original instructions:  Take 4 mg by mouth daily on 3/20, 3/21, 3/22 and have INR checked on 3/23  Quantity:  30 tablet  Refills:  1         * This list has 2 medication(s) that are the same as other medications prescribed for you. Read the directions carefully, and ask your doctor or other care provider to review them with you.            CONTINUE these medicines which may have CHANGED, or have new prescriptions. If we are uncertain of the size of tablets/capsules you have at home, strength may be listed as something that might have changed.      Dose / Directions   acetaminophen 325 MG tablet  Commonly known as:  TYLENOL  This may have changed:      medication strength    how much to take    when to take this    reasons to take this  Used for:  LVAD (left ventricular assist device) present (H)      Dose:  650 mg  Take 2 tablets (650 mg) by mouth every 4 hours as needed for mild pain or fever  Quantity:  100 tablet  Refills:  0     albuterol 108 (90 Base) MCG/ACT inhaler  Commonly known as:  PROAIR HFA/PROVENTIL HFA/VENTOLIN HFA  This may have changed:      when to take this    reasons to take this  Used for:  LVAD (left ventricular assist device) present (H)      Dose:  2 puff  Inhale 2 puffs into the lungs every 6 hours as needed for wheezing  Quantity:  1 Inhaler  Refills:  1     allopurinol 100 MG tablet  Commonly known as:  ZYLOPRIM  This may have changed:  how to take this  Used for:  LVAD (left ventricular assist device) present (H)      Dose:  200 mg  2 tablets (200 mg) by Oral or Feeding Tube route daily  Quantity:  60 tablet  Refills:  1     atorvastatin 20 MG tablet  Commonly  known as:  LIPITOR  This may have changed:      how to take this    when to take this  Used for:  LVAD (left ventricular assist device) present (H)      Dose:  20 mg  1 tablet (20 mg) by Oral or Feeding Tube route every evening  Quantity:  30 tablet  Refills:  1     coenzyme Q-10 200 MG Caps  This may have changed:      how much to take    how to take this      Dose:  200 mg  Start taking on:  March 21, 2020  200 mg by Oral or Feeding Tube route daily  Refills:  0     insulin glargine 100 UNIT/ML pen  Commonly known as:  LANTUS PEN  This may have changed:  how much to take  Used for:  LVAD (left ventricular assist device) present (H)      Dose:  10 Units  Inject 10 Units Subcutaneous At Bedtime  Quantity:  3 mL  Refills:  1     magnesium oxide 400 MG tablet  Commonly known as:  MAG-OX  This may have changed:  how to take this  Used for:  LVAD (left ventricular assist device) present (H)      Dose:  400 mg  1 tablet (400 mg) by Oral or Feeding Tube route daily  Quantity:  30 tablet  Refills:  1        CONTINUE these medicines which have NOT CHANGED      Dose / Directions   aspirin 81 MG EC tablet  Commonly known as:  ASA  Used for:  LVAD (left ventricular assist device) present (H)      Dose:  81 mg  Take 1 tablet (81 mg) by mouth daily  Quantity:  30 tablet  Refills:  1     BD JAIME U/F 32G X 4 MM miscellaneous  Generic drug:  insulin pen needle      Refills:  0     FLUoxetine 20 MG capsule  Commonly known as:  PROzac  Used for:  LVAD (left ventricular assist device) present (H)      Dose:  20 mg  Take 1 capsule (20 mg) by mouth daily  Quantity:  30 capsule  Refills:  1     furosemide 40 MG tablet  Commonly known as:  LASIX  Used for:  LVAD (left ventricular assist device) present (H)      Dose:  40 mg  Take 1 tablet (40 mg) by mouth daily  Quantity:  30 tablet  Refills:  1     MULTIPLE MINERALS-VITAMINS PO      Dose:  1 tablet  Take 1 tablet by mouth daily  Refills:  0     ondansetron 4 MG tablet  Commonly known as:   ZOFRAN      Dose:  4 mg  Take 4 mg by mouth every 4 hours as needed for nausea  Refills:  0        STOP taking    glipiZIDE 2.5 MG 24 hr tablet  Commonly known as:  GLUCOTROL XL        omeprazole 20 MG DR capsule  Commonly known as:  priLOSEC        sacubitril-valsartan  MG per tablet  Commonly known as:  ENTRESTO        spironolactone 25 MG tablet  Commonly known as:  ALDACTONE              Where to get your medicines      These medications were sent to [facility] - [place], [place] - [address] [place] [id number]    Phone:  [phone]     acetaminophen 325 MG tablet    albuterol 108 (90 Base) MCG/ACT inhaler    allopurinol 100 MG tablet    amiodarone 200 MG tablet    aspirin 81 MG EC tablet    atorvastatin 20 MG tablet    cephALEXin 500 MG capsule    diclofenac 1 % topical gel    digoxin 125 MCG tablet    FLUoxetine 20 MG capsule    furosemide 40 MG tablet    hydrALAZINE 100 MG tablet    insulin glargine 100 UNIT/ML pen    lisinopril 10 MG tablet    magnesium oxide 400 MG tablet    methocarbamol 500 MG tablet    multivitamin w/minerals tablet    pantoprazole 40 MG EC tablet    potassium chloride ER 20 MEQ CR tablet    senna-docusate 8.6-50 MG tablet    warfarin ANTICOAGULANT 4 MG tablet     Some of these will need a paper prescription and others can be bought over the counter. Ask your nurse if you have questions.    Bring a paper prescription for each of these medications    cephalexin 500 MG tablet              Discharge Instructions and Follow-Up:   Avoid lifting anything greater than ten pounds for 6 weeks after surgery and then less than 20 pounds for an additional 6 weeks.    No driving for 4 weeks after surgery or while on pain medication. If they had a minimally invasive procedure, they may drive after three weeks if not taking pain medication.      Avoid strenuous activities such as bowling, vacuuming, raking, shoveling, golf or tennis for 12  weeks after your surgery. Splint chest incision by hugging a pillow or bringing arms across chest when coughing or sneezing. Avoid pushing off with arms when getting up for the first month if sternum was opened.    Shower or wash incisions daily with soap and water (or as instructed), pat dry. Watch for signs of infection: increased redness, tenderness, warmth or any drainage that appears infected (pus like) or is persistent.  Also a temperature > 100.5 F or chills. Call surgeon or primary care provider's office immediately. Remove any skin glue left on incisions after 10 days. This will not affect the incision and can speed up healing.    Avoid sitting for prolonged periods of time, try to walk every hour during the day. If patient has a leg incision, patient should elevate leg often when not walking.    Check weight when arriving at home from the hospital and continue to check it daily through recovery for at least a month. Patient instructed to call with any weight gain more than 3 pounds overnight or 5 pounds in a week.     We recommend using stool softeners while using narcotics for pain.      DENTAL VISITS AFTER SURGERY  If a heart valve was repaired or replaced, we do not recommend having any dental work done for 6 months and we recommend taking an antibiotic prior to dental visits from now on.  Patient is to notify their dentist before any procedure for the proper treatment needed. The antibiotic is taken by mouth one hour prior to visit. This includes routine cleanings.    POST-OPERATIVE CLINIC VISITS  Follow-up to be arranged by your LVAD coordinator.         Home Health Care:     Not needed           Discharge Disposition:     Discharged to home      Condition at discharge: Stable    Mono Gambino PA-C            CC:Jay Pedraza Melinda, PA-C Alexy, Tamas, MD

## 2020-03-20 ENCOUNTER — ANTICOAGULATION THERAPY VISIT (OUTPATIENT)
Dept: ANTICOAGULATION | Facility: CLINIC | Age: 65
End: 2020-03-20

## 2020-03-20 ENCOUNTER — APPOINTMENT (OUTPATIENT)
Dept: OCCUPATIONAL THERAPY | Facility: CLINIC | Age: 65
DRG: 001 | End: 2020-03-20
Attending: INTERNAL MEDICINE
Payer: MEDICARE

## 2020-03-20 VITALS
HEIGHT: 67 IN | HEART RATE: 80 BPM | BODY MASS INDEX: 30.67 KG/M2 | WEIGHT: 195.4 LBS | DIASTOLIC BLOOD PRESSURE: 71 MMHG | OXYGEN SATURATION: 95 % | TEMPERATURE: 98.7 F | SYSTOLIC BLOOD PRESSURE: 92 MMHG | RESPIRATION RATE: 18 BRPM

## 2020-03-20 DIAGNOSIS — Z95.811 LVAD (LEFT VENTRICULAR ASSIST DEVICE) PRESENT (H): ICD-10-CM

## 2020-03-20 DIAGNOSIS — I50.22 CHRONIC SYSTOLIC CONGESTIVE HEART FAILURE (H): Primary | ICD-10-CM

## 2020-03-20 LAB
ANION GAP SERPL CALCULATED.3IONS-SCNC: 3 MMOL/L (ref 3–14)
APTT PPP: 59 SEC (ref 22–37)
BUN SERPL-MCNC: 24 MG/DL (ref 7–30)
CALCIUM SERPL-MCNC: 8.7 MG/DL (ref 8.5–10.1)
CHLORIDE SERPL-SCNC: 101 MMOL/L (ref 94–109)
CO2 SERPL-SCNC: 31 MMOL/L (ref 20–32)
CREAT SERPL-MCNC: 1.05 MG/DL (ref 0.66–1.25)
ERYTHROCYTE [DISTWIDTH] IN BLOOD BY AUTOMATED COUNT: 17.4 % (ref 10–15)
GFR SERPL CREATININE-BSD FRML MDRD: 73 ML/MIN/{1.73_M2}
GLUCOSE BLDC GLUCOMTR-MCNC: 104 MG/DL (ref 70–99)
GLUCOSE BLDC GLUCOMTR-MCNC: 123 MG/DL (ref 70–99)
GLUCOSE BLDC GLUCOMTR-MCNC: 124 MG/DL (ref 70–99)
GLUCOSE SERPL-MCNC: 111 MG/DL (ref 70–99)
HCT VFR BLD AUTO: 28.4 % (ref 40–53)
HGB BLD-MCNC: 8.3 G/DL (ref 13.3–17.7)
INR PPP: 2.84 (ref 0.86–1.14)
MAGNESIUM SERPL-MCNC: 2.2 MG/DL (ref 1.6–2.3)
MCH RBC QN AUTO: 26.3 PG (ref 26.5–33)
MCHC RBC AUTO-ENTMCNC: 29.2 G/DL (ref 31.5–36.5)
MCV RBC AUTO: 90 FL (ref 78–100)
PLATELET # BLD AUTO: 531 10E9/L (ref 150–450)
POTASSIUM SERPL-SCNC: 4.3 MMOL/L (ref 3.4–5.3)
RBC # BLD AUTO: 3.15 10E12/L (ref 4.4–5.9)
SODIUM SERPL-SCNC: 135 MMOL/L (ref 133–144)
WBC # BLD AUTO: 10.4 10E9/L (ref 4–11)

## 2020-03-20 PROCEDURE — 25000132 ZZH RX MED GY IP 250 OP 250 PS 637: Mod: GY | Performed by: STUDENT IN AN ORGANIZED HEALTH CARE EDUCATION/TRAINING PROGRAM

## 2020-03-20 PROCEDURE — 25000132 ZZH RX MED GY IP 250 OP 250 PS 637: Mod: GY | Performed by: PHYSICIAN ASSISTANT

## 2020-03-20 PROCEDURE — 99232 SBSQ HOSP IP/OBS MODERATE 35: CPT | Mod: 25 | Performed by: NURSE PRACTITIONER

## 2020-03-20 PROCEDURE — 25000132 ZZH RX MED GY IP 250 OP 250 PS 637: Mod: GY | Performed by: NURSE PRACTITIONER

## 2020-03-20 PROCEDURE — 97110 THERAPEUTIC EXERCISES: CPT | Mod: GO | Performed by: OCCUPATIONAL THERAPIST

## 2020-03-20 PROCEDURE — 93750 INTERROGATION VAD IN PERSON: CPT | Performed by: NURSE PRACTITIONER

## 2020-03-20 PROCEDURE — 85027 COMPLETE CBC AUTOMATED: CPT | Performed by: THORACIC SURGERY (CARDIOTHORACIC VASCULAR SURGERY)

## 2020-03-20 PROCEDURE — 00000146 ZZHCL STATISTIC GLUCOSE BY METER IP

## 2020-03-20 PROCEDURE — 36415 COLL VENOUS BLD VENIPUNCTURE: CPT | Performed by: THORACIC SURGERY (CARDIOTHORACIC VASCULAR SURGERY)

## 2020-03-20 PROCEDURE — 80048 BASIC METABOLIC PNL TOTAL CA: CPT | Performed by: THORACIC SURGERY (CARDIOTHORACIC VASCULAR SURGERY)

## 2020-03-20 PROCEDURE — 83735 ASSAY OF MAGNESIUM: CPT | Performed by: THORACIC SURGERY (CARDIOTHORACIC VASCULAR SURGERY)

## 2020-03-20 PROCEDURE — 85730 THROMBOPLASTIN TIME PARTIAL: CPT | Performed by: THORACIC SURGERY (CARDIOTHORACIC VASCULAR SURGERY)

## 2020-03-20 PROCEDURE — 97535 SELF CARE MNGMENT TRAINING: CPT | Mod: GO | Performed by: OCCUPATIONAL THERAPIST

## 2020-03-20 PROCEDURE — 85610 PROTHROMBIN TIME: CPT | Performed by: THORACIC SURGERY (CARDIOTHORACIC VASCULAR SURGERY)

## 2020-03-20 RX ORDER — WARFARIN SODIUM 4 MG/1
TABLET ORAL
Qty: 30 TABLET | Refills: 1 | Status: SHIPPED | OUTPATIENT
Start: 2020-03-20 | End: 2020-04-02

## 2020-03-20 RX ORDER — WARFARIN SODIUM 4 MG/1
4 TABLET ORAL
Status: DISCONTINUED | OUTPATIENT
Start: 2020-03-20 | End: 2020-03-20 | Stop reason: HOSPADM

## 2020-03-20 RX ORDER — UBIDECARENONE 200 MG
200 CAPSULE ORAL DAILY
COMMUNITY
Start: 2020-03-21

## 2020-03-20 RX ORDER — CEPHALEXIN 500 MG/1
500 CAPSULE ORAL 3 TIMES DAILY
Qty: 15 CAPSULE | Refills: 0 | Status: SHIPPED | OUTPATIENT
Start: 2020-03-20 | End: 2020-03-25

## 2020-03-20 RX ADMIN — FLUOXETINE 20 MG: 20 CAPSULE ORAL at 08:34

## 2020-03-20 RX ADMIN — MAGNESIUM OXIDE 400 MG: 400 TABLET ORAL at 08:34

## 2020-03-20 RX ADMIN — HYDRALAZINE HYDROCHLORIDE 100 MG: 100 TABLET, FILM COATED ORAL at 06:51

## 2020-03-20 RX ADMIN — MULTIPLE VITAMINS W/ MINERALS TAB 1 TABLET: TAB at 08:33

## 2020-03-20 RX ADMIN — Medication 200 MG: at 08:34

## 2020-03-20 RX ADMIN — CEPHALEXIN 250 MG: 250 CAPSULE ORAL at 06:51

## 2020-03-20 RX ADMIN — AMIODARONE HYDROCHLORIDE 200 MG: 200 TABLET ORAL at 08:33

## 2020-03-20 RX ADMIN — DIGOXIN 125 MCG: 125 TABLET ORAL at 08:34

## 2020-03-20 RX ADMIN — ASPIRIN 81 MG CHEWABLE TABLET 81 MG: 81 TABLET CHEWABLE at 08:34

## 2020-03-20 RX ADMIN — POTASSIUM CHLORIDE 20 MEQ: 750 TABLET, EXTENDED RELEASE ORAL at 08:33

## 2020-03-20 RX ADMIN — PANTOPRAZOLE SODIUM 40 MG: 40 TABLET, DELAYED RELEASE ORAL at 08:33

## 2020-03-20 RX ADMIN — LISINOPRIL 10 MG: 10 TABLET ORAL at 08:34

## 2020-03-20 RX ADMIN — FUROSEMIDE 40 MG: 40 TABLET ORAL at 08:33

## 2020-03-20 RX ADMIN — ALLOPURINOL 200 MG: 100 TABLET ORAL at 08:34

## 2020-03-20 ASSESSMENT — MIFFLIN-ST. JEOR: SCORE: 1624.96

## 2020-03-20 ASSESSMENT — ACTIVITIES OF DAILY LIVING (ADL)
ADLS_ACUITY_SCORE: 16

## 2020-03-20 NOTE — PHARMACY-ANTICOAGULATION SERVICE
Clinical Pharmacy- Warfarin Discharge Note  This patient is currently on warfarin for the treatment of LVAD.  INR Goal= 2-3  Expected length of therapy While LVAD in place.      Anticoagulation Dose History     Recent Dosing and Labs Latest Ref Rng & Units 3/14/2020 3/15/2020 3/16/2020 3/17/2020 3/18/2020 3/19/2020 3/20/2020    ZZ IMS TEMPLATE - 7 mg - - - - - -    Warfarin 2.5 mg - - - - 2.5 mg - - -    Warfarin 3 mg - - 6 mg - - - - -    Warfarin 4 mg - - - - - 4 mg 4 mg -    Warfarin 5 mg - - - 5 mg - - - -    INR 0.86 - 1.14 - 2.97(H) 3.15(H) 3.54(H) 3.32(H) 3.00(H) 2.84(H)    Chromogenic Factor 10 70 - 130 % 32(L) 27(L) 27(L) - - - -          Vitamin K doses administered during the last 7 days: none  FFP administered during the last 7 days: none  Recommend discharging the patient on a warfarin regimen of 4 mg with a prescription for warfarin 4mg tablets.      The patient should have an INR checked on Monday, 3/23/2020..    Jessica Garcia, PharmD, [place]

## 2020-03-20 NOTE — PROGRESS NOTES
[facility]   Cardiology II Service / Advanced Heart Failure  Daily Progress Note  Date of Service: 3/20/2020      Patient: Eliseo Tanner  MRN: [id number]  Admission Date: 2/6/2020  Hospital Day # 43    Assessment and Plan: Eliseo Tanner is a 64 year old male with chronic systolic heart failure secondary to NICM, moderate CAD, HTN, ABHINAV on CPAP, DM2, and CKD Stage III. He was transferred to [facility] for evaluation and management of advanced heart failure with arrhythmia now s/p HM 3 on 2/18. Cardiology consult for volume management.     Chronic SCHF secondary to NICM s/p HM III with RV dysfunction. Implanted 2/18 and complicated by bleeding. Echo at 5600 rpm notes mild-moderate RV dysfunction with AV closed and trace AI, current speed 5500 rpm.   Stage D, NYHA Class IIIB  ACEi/ARB Lisinopril 10 mg po daily. Hydralazine 100 mg po TID.   BB Defer s/p LVAD  Aldosterone antagonist deferred while other medical therapy is prioritized  SCD prophylaxis ICD implanted 3/16.`  Fluid status euvolemic. Lasix 40 mg po daily   MAP: 81  LDH: 294 2/18   Anticoagulation: Coumadin per pharmacy. INR-2.84, Goal 2-3.  Antiplatelet: ASA 81 mg po daily  - Continue Digoxin for RV support.      The patient's HeartMate LVAD was interrogated 3/20/2020  * Speed 5500 rpm   * Pulsatility index 3.5  * Power 4.2 Driver   * Flow 4.4 L/minute   Fluid status: Euvolemic   Alarms were reviewed, and consistent with rare PI events  The driveline exit site was covered with dressing clean, dry, and intact.   All external components were inspected and showed no evidence of damage or malfunction.     HTN.   - MAP 81  - Continue Lisinopril and Hydralazine as above.      New Onset Afib s/p DCCV/history of Aflutter. ECG consistent with Afib w/ RVR and aberrancy vs. residential vs. AT vs. Slow VT. S/p DCCV on 2/28 back into sinus rhythm.  - Coumadin as above.   - Continue Digoxin.      Retrosternal Hematoma. CT chest 2/26: measuring 5.1 cm. S/P  "chest tube per CVTS 3/5, d/c'ed 3/15. Chest X-ray 3/18 with stable fluid to right interlobular fissure, Hgb stable.   - Hgb stable. Management per CVTS.      Chronic/Resolved Medical Conditions:  CAD. Continue ASA and statin. BB deferred s/p LVAD.  Proteus and Enterococcus Bacteremia, resolved. Blood cx 2/13 positive 2/2. Blood cultures 2/15 +1/2 S.epi, likely contaminant per ID. Zosyn (2/13-2/14) Tobramycin (2/13-2/14) Vancomycin (2/13-2/17) Meropenem (2/14-2/15) Ceftriaxone (2/16-2/28) -Ampicillin (2/16-3/11) (14 days after swan removal on 2/26).Blood cx negative since 2/15, ordered [id number] 3/15 NTD.   Thrombocytopenia. HIT positive DAVINA negative. Antibody negative x2, thus no further indication for PLEX. Heme consulted. Continue Coumadin as above.   VF s/p external shock. Continue Amio, s/p ICD 3/16.   ================================================================    Interval History/ROS: He is feeling well and eager to discharge. He denies fever, chills, chest pain, palpitations, cough, MIRANDA, nausea, vomiting, diarrhea, hematochezia, hematemesis, concerns to LVAD drive line site, and LE edema. He is tolerating oral intake and ambulation.     Last 24 hr care team notes reviewed.   ROS:  4 point ROS including Respiratory, CV, GI and , other than that noted in the HPI, is negative.     Medications: Reviewed in EPIC.     Physical Exam:   BP 92/71 (BP Location: Left arm)   Pulse 80   Temp 98.7  F (37.1  C) (Oral)   Resp 18   Ht 1.702 m (5' 7\")   Wt 88.6 kg (195 lb 6.4 oz)   SpO2 95%   BMI 30.60 kg/m    GENERAL: Appears alert and oriented times three.   HEENT: Eye symmetrical and free of discharge bilaterally. Mucous membranes moist and without lesions.  NECK: Supple and without lymphadenopathy. JVD at clavicular line.   CV: RRR, S1S2 present with LVAD hum.   RESPIRATORY: Respirations regular, even, and unlabored. Lungs CTA throughout.   GI: Soft and non distended with normoactive bowel sounds present in all " quadrants. No tenderness, rebound, guarding. No organomegaly.   EXTREMITIES: No peripheral edema. 2+ bilateral pedal pulses.   NEUROLOGIC: Alert and orientated x 3. CN II-XII grossly intact. No focal deficits.   MUSCULOSKELETAL: No joint swelling or tenderness.   SKIN: No jaundice. No rashes or lesions. LVAD drive line covered.     Data:  CMP  Recent Labs   Lab 03/20/20  0530 03/19/20  0529 03/18/20  0553 03/17/20  0508    138 137 138   POTASSIUM 4.3 4.1 4.0 4.2   CHLORIDE 101 103 102 103   CO2 31 29 29 29   ANIONGAP 3 6 6 6   * 110* 109* 120*   BUN 24 26 23 25   CR 1.05 1.08 1.14 1.04   GFRESTIMATED 73 71 66 74   GFRESTBLACK 85 82 77 86   CALOS 8.7 8.3* 8.3* 8.1*   MAG 2.2 1.9 2.1 2.2     CBC  Recent Labs   Lab 03/20/20  0530 03/19/20  0529 03/18/20  0553 03/17/20  0508   WBC 10.4 9.7 9.1 10.2   RBC 3.15* 3.05* 3.17* 3.12*   HGB 8.3* 8.0* 8.3* 8.2*   HCT 28.4* 27.6* 29.1* 28.9*   MCV 90 91 92 93   MCH 26.3* 26.2* 26.2* 26.3*   MCHC 29.2* 29.0* 28.5* 28.4*   RDW 17.4* 17.2* 17.2* 17.4*   * 481* 484* 466*     INR  Recent Labs   Lab 03/20/20  0530 03/19/20  0529 03/18/20  0553 03/17/20  0508   INR 2.84* 3.00* 3.32* 3.54*       Patient discussed with Dr. Cisneros.      Laine Leon FNP  3/20/2020

## 2020-03-20 NOTE — PLAN OF CARE
DISCHARGE   Discharged to: [facility]  Via: Automobile  Accompanied by: Family  Discharge Instructions: diet, activity, medications, follow up appointments, when to call the MD, and what to watchout for post LVAD placement  Prescriptions: To be filled by [facility] pharmacy per pt's request; medication list reviewed & sent with pt  Follow Up Appointments: arranged; information given  Belongings: All sent with pt  IV: out  Telemetry: off  Pt exhibits understanding of above discharge instructions; all questions answered.  Discharge Paperwork: faxed/sent with pt

## 2020-03-20 NOTE — PLAN OF CARE
Temp: 98.9  F (37.2  C) Temp src: Axillary BP: 96/66 Pulse: 80 Heart Rate: 84 Resp: 18 SpO2: 98 % O2 Device: BiPAP/CPAP       D: S/p LVAD HM3 2/18 c/b hemothorax s/p washout 3/6 and ICD 3/16. Hx Systolic HF, NICM, CAD, HTN, ABHINAV, DM2, CKD3    I/A: Monitored vitals and assessed pt status. A&O x4. VSS see flowsheet. Aflutter . RA, CPAP at hs. LVAD numbers WNL, no alarms, dressing CDI. Denies pain, SOB, nausea. Voiding adequate amount in bedside urinal. Up SBA, did not ambulate this shift. Sleeping between cares.    P: Plan to discharge to [place] today. Continue to monitor and follow POC. Notify CVTS with changes.

## 2020-03-20 NOTE — PLAN OF CARE
D:pt planning discharge tomorrow, wife notified, pharmacy contacted  A:meds and discharge orders will be done in am  P:pt wife might have follow questions if not answered will contact LVAD coordinator

## 2020-03-20 NOTE — PROGRESS NOTES
ANTICOAGULATION  MANAGEMENT: Discharge Review    Eliseo Tanner chart reviewed for anticoagulation continuity of care    Hospital Admission on 2/6-3/20 for LVAD placement.    Discharge disposition: Staying locally at [facility] [place]    Results:    Recent Labs   Lab Test 03/20/20  0530 03/19/20  0529 03/18/20  0553 03/17/20  0508 03/16/20  0526 03/15/20  0655 03/13/20  0551   INR 2.84* 3.00* 3.32* 3.54* 3.15* 2.97* 2.76*       Anticoagulation inpatient management:     See calender for dosing and results    Anticoagulation discharge instructions:     Warfarin dosing: Instructions were given to take warfarin 4mg daily   Bridging: No   INR goal change: No      Medication changes affecting anticoagulation:Yes Amiodarone and Cephelexin    Additional factors affecting anticoagulation: No    Plan     No adjustment to anticoagulation plan needed    Patient not contacted    Anticoagulation Calendar updated    Gloria Abbasi RN

## 2020-03-20 NOTE — PLAN OF CARE
Discharge Planner OT   Patient plan for discharge: vi benoit  Current status: pt ambulating mod I and mod I with basic ADL's.   Barriers to return to prior living situation: post surgical precautions and deconditioning.   Recommendations for discharge: outpatient CR  Rationale for recommendations: CR to maximize functional endurance.        Entered by: Betito Day 03/20/2020 1:55 PM

## 2020-03-20 NOTE — PLAN OF CARE
Physical Therapy Discharge Summary    Reason for therapy discharge:    Discharged to home with outpatient therapy.    Progress towards therapy goal(s). See goals on Care Plan in [facility] electronic health record for goal details.  Goals not met.  Barriers to achieving goals:   discharge from facility.    Therapy recommendation(s):    Continued therapy is recommended.  Rationale/Recommendations:  to progress strength, high level balance, endurance, and mobility IND.

## 2020-03-21 ENCOUNTER — CARE COORDINATION (OUTPATIENT)
Dept: CARDIOLOGY | Facility: CLINIC | Age: 65
End: 2020-03-21

## 2020-03-21 ENCOUNTER — ANCILLARY PROCEDURE (OUTPATIENT)
Dept: CARDIOLOGY | Facility: CLINIC | Age: 65
End: 2020-03-21
Attending: INTERNAL MEDICINE
Payer: MEDICARE

## 2020-03-21 PROCEDURE — 93295 DEV INTERROG REMOTE 1/2/MLT: CPT | Performed by: INTERNAL MEDICINE

## 2020-03-21 PROCEDURE — 93296 REM INTERROG EVL PM/IDS: CPT | Mod: ZF

## 2020-03-21 NOTE — PROGRESS NOTES
D:  Called pt to check in after discharge from hospital yesterday.  Pt states he does not have any questions and is feeling good.  I:  Instructed pt to call VAD coordinator w/ any questions or concerns.  A:  Pt verbalized understanding.  P:  VAD Coordinator available for questions or concerns.

## 2020-03-23 ENCOUNTER — ANTICOAGULATION THERAPY VISIT (OUTPATIENT)
Dept: ANTICOAGULATION | Facility: CLINIC | Age: 65
End: 2020-03-23

## 2020-03-23 ENCOUNTER — CARE COORDINATION (OUTPATIENT)
Dept: CARDIOLOGY | Facility: CLINIC | Age: 65
End: 2020-03-23

## 2020-03-23 DIAGNOSIS — Z95.811 LVAD (LEFT VENTRICULAR ASSIST DEVICE) PRESENT (H): ICD-10-CM

## 2020-03-23 DIAGNOSIS — I50.22 CHRONIC SYSTOLIC CONGESTIVE HEART FAILURE (H): ICD-10-CM

## 2020-03-23 LAB
ANION GAP SERPL CALCULATED.3IONS-SCNC: 7 MMOL/L (ref 3–14)
BASOPHILS # BLD AUTO: 0.1 10E9/L (ref 0–0.2)
BASOPHILS NFR BLD AUTO: 1 %
BUN SERPL-MCNC: 17 MG/DL (ref 7–30)
CALCIUM SERPL-MCNC: 9.2 MG/DL (ref 8.5–10.1)
CHLORIDE SERPL-SCNC: 104 MMOL/L (ref 94–109)
CO2 SERPL-SCNC: 23 MMOL/L (ref 20–32)
CREAT SERPL-MCNC: 1.02 MG/DL (ref 0.66–1.25)
DIFFERENTIAL METHOD BLD: ABNORMAL
EOSINOPHIL # BLD AUTO: 1 10E9/L (ref 0–0.7)
EOSINOPHIL NFR BLD AUTO: 10.5 %
ERYTHROCYTE [DISTWIDTH] IN BLOOD BY AUTOMATED COUNT: 17.4 % (ref 10–15)
GFR SERPL CREATININE-BSD FRML MDRD: 76 ML/MIN/{1.73_M2}
GLUCOSE SERPL-MCNC: 154 MG/DL (ref 70–99)
HCT VFR BLD AUTO: 32.3 % (ref 40–53)
HGB BLD-MCNC: 9.2 G/DL (ref 13.3–17.7)
IMM GRANULOCYTES # BLD: 0.1 10E9/L (ref 0–0.4)
IMM GRANULOCYTES NFR BLD: 1.2 %
INR PPP: 2.5 (ref 0.86–1.14)
LYMPHOCYTES # BLD AUTO: 1.6 10E9/L (ref 0.8–5.3)
LYMPHOCYTES NFR BLD AUTO: 16.4 %
MCH RBC QN AUTO: 25.9 PG (ref 26.5–33)
MCHC RBC AUTO-ENTMCNC: 28.5 G/DL (ref 31.5–36.5)
MCV RBC AUTO: 91 FL (ref 78–100)
MONOCYTES # BLD AUTO: 0.9 10E9/L (ref 0–1.3)
MONOCYTES NFR BLD AUTO: 8.7 %
NEUTROPHILS # BLD AUTO: 6.1 10E9/L (ref 1.6–8.3)
NEUTROPHILS NFR BLD AUTO: 62.2 %
NRBC # BLD AUTO: 0 10*3/UL
NRBC BLD AUTO-RTO: 0 /100
PLATELET # BLD AUTO: 514 10E9/L (ref 150–450)
POTASSIUM SERPL-SCNC: 4.5 MMOL/L (ref 3.4–5.3)
RBC # BLD AUTO: 3.55 10E12/L (ref 4.4–5.9)
SODIUM SERPL-SCNC: 134 MMOL/L (ref 133–144)
WBC # BLD AUTO: 9.8 10E9/L (ref 4–11)

## 2020-03-23 NOTE — PROGRESS NOTES
ANTICOAGULATION  MANAGEMENT: Discharge Review    Eliseo Tanner chart reviewed for anticoagulation continuity of care    Hospital Admission on 2/6 to 3/20/20 for LVAD Implant.    Discharge disposition: Home    Results:    Recent Labs   Lab Test 03/20/20  0530 03/19/20  0529 03/18/20  0553 03/17/20  0508 03/16/20  0526 03/15/20  0655 03/13/20  0551   INR 2.84* 3.00* 3.32* 3.54* 3.15* 2.97* 2.76*       Anticoagulation inpatient management:     New Start    Anticoagulation discharge instructions:     Warfarin dosing: Discharged on warfarin 4mgs daily   Bridging: No   INR goal change: No      Medication changes affecting anticoagulation: Yes:  Started Amiodarone    Additional factors affecting anticoagulation: Yes: Drinking Boost Plus    Plan     Agree with dosing adjustment on discharge    Patient not contacted    Anticoagulation Calendar updated    Vito Olson [facility]

## 2020-03-23 NOTE — PROGRESS NOTES
ANTICOAGULATION FOLLOW-UP CLINIC VISIT    Patient Name:  Eliseo Tanner  Date:  3/23/2020  Contact Type:  Telephone    SUBJECTIVE:  Patient Findings     Positives:   Change in medications (Eliseo has 2 days of cephelexin left)    Comments:   Spoke with spouse, Veronique.  Eliseo is drinking Premier Premium Plus protein drink, instead of Boost Plus.  The Premier Plus drink has 25% of vitamin K.          Clinical Outcomes     Comments:   Spoke with spouse, Veronique.  Eliseo is drinking Premier Premium Plus protein drink, instead of Boost Plus.  The Premier Plus drink has 25% of vitamin K.             OBJECTIVE    INR   Date Value Ref Range Status   2020 2.50 (H) 0.86 - 1.14 Final     Chromogenic Factor 10   Date Value Ref Range Status   2020 27 (L) 70 - 130 % Final     Comment:     Therapeutic Range:  A Chromogenic Factor 10 level of approximately 20-40%   inversely correlates with an INR of 2-3 for patients receiving Warfarin.   Chromogenic Factor 10 levels below 20% indicate an INR greater than 3 and   levels above 40% indicate an INR less than 2.         ASSESSMENT / PLAN  INR assessment THER    Recheck INR In: 3 DAYS    INR Location Clinic      Anticoagulation Summary  As of 3/23/2020    INR goal:   2.0-3.0   TTR:   --   INR used for dosin.50 (3/23/2020)   Warfarin maintenance plan:   No maintenance plan   Full warfarin instructions:   3/23: 4 mg; 3/24: 4 mg; 3/25: 4 mg; Otherwise No maintenance plan   Next INR check:   3/26/2020   Priority:   Critical   Target end date:   Indefinite    Indications    LVAD (left ventricular assist device) present (H) [Z95.811]             Anticoagulation Episode Summary     INR check location:       Preferred lab:       Send INR reminders to:   [facility]    Comments:   Patient is staying at [facility].  Patient on Amiodarone      Anticoagulation Care Providers     Provider Role Specialty Phone number    Gucci Tomas MD Referring Cardiology  [phone]            See the Encounter Report to view Anticoagulation Flowsheet and Dosing Calendar (Go to Encounters tab in chart review, and find the Anticoagulation Therapy Visit)    Spoke with spouse, Veronique.  Went over how the [facility] works.  Gave her the direct phone number to the [facility].  Answered her questions.  They do not need a refill of warfarin at this time, she will call the [facility] when they do.  They are staying at [facility] until mid April.    She reports she has a warfarin booklet and does not need one mailed.      Patient had LVAD placed on:   2/18/2020  Type of LVAD: HM 3  Patient's current Aspirin dose: 81 mg daily  LVAD Protocol followed: Yes    Krysta Mcdaniel RN

## 2020-03-23 NOTE — PROGRESS NOTES
D: Follow up labs rcvd  I:  Pt notified lab values as expected per Laine BARRY NP, no further follow up needed.  A:  Pt verbalized understanding  P:  RTC 4/2 for labs and appt with Karolina PA.  Pt will call VAD coordinator with further needs and questions.

## 2020-03-25 ENCOUNTER — CARE COORDINATION (OUTPATIENT)
Dept: CARDIOLOGY | Facility: CLINIC | Age: 65
End: 2020-03-25

## 2020-03-25 NOTE — PROGRESS NOTES
"Called patient/caregiver to check in ~2 weeks post discharge. Pt reports VAD parameters stable and weight stable. Reviewed medications and answered any questions. Patient reports sleeping fine and no anxiety since being home with LVAD. Patient is fully able to move around the house and care for him/herself independently.    Discussed specific new problems/stressors since being discharged from the hospital: \"no concerns at this time\". Empathized with patient and reviewed coping strategies: enlisting support from friends and love ones, attending patient and caregiver support groups, reviewing LVAD educational materials to reinforce knowledge, and talking about concerns with family/care providers/trusted others. Encouraged pt to page VAD Coordinator with any issues or questions. Pt verbalizes understanding.    "

## 2020-03-26 ENCOUNTER — ANTICOAGULATION THERAPY VISIT (OUTPATIENT)
Dept: ANTICOAGULATION | Facility: CLINIC | Age: 65
End: 2020-03-26

## 2020-03-26 ENCOUNTER — HOSPITAL ENCOUNTER (OUTPATIENT)
Facility: CLINIC | Age: 65
Setting detail: SPECIMEN
Discharge: HOME | End: 2020-03-26
Admitting: INTERNAL MEDICINE
Payer: MEDICARE

## 2020-03-26 DIAGNOSIS — Z95.811 LVAD (LEFT VENTRICULAR ASSIST DEVICE) PRESENT (H): ICD-10-CM

## 2020-03-26 LAB — INR PPP: 1.9 (ref 0.86–1.14)

## 2020-03-26 PROCEDURE — 36415 COLL VENOUS BLD VENIPUNCTURE: CPT | Performed by: INTERNAL MEDICINE

## 2020-03-26 PROCEDURE — 85610 PROTHROMBIN TIME: CPT | Performed by: INTERNAL MEDICINE

## 2020-03-26 NOTE — PROGRESS NOTES
3/26/20  Instructed patient to go into the lab on Saturday 3/28 due to subtherapeutic INR result per LVAD protocol.    Guideline for dosing over the weekend:    INR result below 2.0 please consider 8mg on Sat, 6mg on Sun    INR result within goal of 2-3, please consider 6mg on Sat, 6mg on     INR result above 2.0 please consider 4mg on Sat, 4mg on     Please have Eliseo get an INR on Monday 3/30.    Sent message to LVAD pool to look for INR results on 3/28.    Young Bustos RN  ANTICOAGULATION FOLLOW-UP CLINIC VISIT    Patient Name:  Eliseo Tanner  Date:  3/26/2020  Contact Type:  Telephone    SUBJECTIVE:  Patient Findings     Comments:   Suspect inconsistent Protein drink consumption is reason behind subtherapeutic INR.  Updated calendar.  Placed suggested dosing for the weekend in notes.        Clinical Outcomes     Comments:   Suspect inconsistent Protein drink consumption is reason behind subtherapeutic INR.  Updated calendar.  Placed suggested dosing for the weekend in notes.           OBJECTIVE    INR   Date Value Ref Range Status   2020 1.90 (H) 0.86 - 1.14 Final     Chromogenic Factor 10   Date Value Ref Range Status   2020 27 (L) 70 - 130 % Final     Comment:     Therapeutic Range:  A Chromogenic Factor 10 level of approximately 20-40%   inversely correlates with an INR of 2-3 for patients receiving Warfarin.   Chromogenic Factor 10 levels below 20% indicate an INR greater than 3 and   levels above 40% indicate an INR less than 2.         ASSESSMENT / PLAN  INR assessment SUB    Recheck INR In: 2 DAYS    INR Location Clinic      Anticoagulation Summary  As of 3/26/2020    INR goal:   2.0-3.0   TTR:   --   INR used for dosin.90! (3/26/2020)   Warfarin maintenance plan:   No maintenance plan   Full warfarin instructions:   3/26: 6 mg; 3/27: 6 mg; Otherwise No maintenance plan   Next INR check:   3/28/2020   Priority:   Critical   Target end date:   Indefinite    Indications     LVAD (left ventricular assist device) present (H) [Z95.811]             Anticoagulation Episode Summary     INR check location:       Preferred lab:       Send INR reminders to:   [facility]    Comments:   Patient is staying at [facility].  Patient on Amiodarone  HM 3  placed 2/18/2020, Speak to spouse, Veronique at [phone]      Anticoagulation Care Providers     Provider Role Specialty Phone number    Gucci Tomas MD Referring Cardiology [phone]            See the Encounter Report to view Anticoagulation Flowsheet and Dosing Calendar (Go to Encounters tab in chart review, and find the Anticoagulation Therapy Visit)    INR/CFX/F2 RESULT: INR result is 1.90    ASSESSMENT: Spoke to patient's spouse.  See note.  Inconsistent diet suspected reason behind subtherapeutic INR.    DOSING ADJUSTMENT: Recommended 6mg today and tomorrow.    NEXT INR/FACTOR X OR FACTOR II:3/28    PROTOCOL FOLLOWED:LVAD goal 2-3  Patient had LVAD placed on:   2/18/20  Type of LVAD: HM 3  Patient's current Aspirin dose: Not on ASA, discontinued per note  LVAD Protocol followed:  Yes.   If Not Followed Explanation:  Young Glez, RN

## 2020-03-26 NOTE — PROGRESS NOTES
Addendum 3/25/20  I spoke with patient's wife Chapis she said that Bon finished his Keflex yesterday.  Wild Olson [facility]

## 2020-03-26 NOTE — PROGRESS NOTES
3/26/20 Lab calling.  Need standing INR order.  This writer sent order, and reviewed Anticoagulation order. KRISTEN Wilburn

## 2020-03-27 ENCOUNTER — CARE COORDINATION (OUTPATIENT)
Dept: CARDIOLOGY | Facility: CLINIC | Age: 65
End: 2020-03-27

## 2020-03-28 ENCOUNTER — CARE COORDINATION (OUTPATIENT)
Dept: CARDIOLOGY | Facility: CLINIC | Age: 65
End: 2020-03-28

## 2020-03-28 NOTE — PROGRESS NOTES
"Patient and his wife paged VAD Coordinator on-call and reported a brief episode of pt feeling dizzy and clammy. Pt reported that he recently finished eating dinner and was sitting in living room when he felt dizzy like the room was spinning/upsidedown. At that time patient felt clammy. Episode was only a few moments. Pt's wife checked his blood sugar which was about 130. VAD numbers flow 3.9, PI 5.7, power 4.4. Pt reported that his PI is usually in the 3's. Pt unable to check BP at home. Since discharging from hospital, weight has been stable but todays weight was 3 pounds up. Pt reported that he had 2 large BMs today and attributed weight to that. Denied dark, tarry or bloody stool.  Denies SOB. Denies swelling. Denies lightheadedness or dizziness with position changes. Stated that he hasn't been dizzy except that one episode. Denies weakness, numbness, tinging, speech changes or vision changes. Denies fever or chills. Denies palpitations or heart racing. Pt had labs drawn 3/23 with stable hgb and kidney fxn and electrolytes. Pt has an ICD. Pt reported that he has changed the way he is taking any of his medications. By end of our conversation pt checked VAD parameters and flow, PI and power were all at pt's baseline and pt denied any more dizziness or clamminess and stated that now he felt \"normal\".   Instructed pt to call to call VAD Coordinator on-call right away if he has another episode like the one described  above or if he has any other symptoms or feels unwell; pt and wife verbalized understanding and stated they were comfortable with plan to closely monitor and call with any concerns. Pt has appt next week.  "

## 2020-03-28 NOTE — PROGRESS NOTES
On 3/26, his INR was 1.9. Coumadin Clinic instructed pt to get INR checked today (Saturday 3/28).  However the [facility] is closed, hospital lab is not letting any outpatients in d/t COVID19 and all other [place] labs are closed over the weekend.This writer also called the hospital lab and  for recommendations and google searched labs in the area; did not find any open labs during weekend (and r/t COVID19) other than urgent care, however did not want to recommend urgent care to pt d/t COVID19.  Discussed situation with Dr. Judd.  Per Dr. Judd, instructed pt to take 6mg today and 6mg tomorrow and to get INR checked on Monday.  Called patient and left a detailed message.  Encouraged pt to call VAD Coordinator on-call with any questions.

## 2020-03-30 ENCOUNTER — ANTICOAGULATION THERAPY VISIT (OUTPATIENT)
Dept: ANTICOAGULATION | Facility: CLINIC | Age: 65
End: 2020-03-30

## 2020-03-30 DIAGNOSIS — Z95.811 LVAD (LEFT VENTRICULAR ASSIST DEVICE) PRESENT (H): ICD-10-CM

## 2020-03-30 DIAGNOSIS — I50.22 CHRONIC SYSTOLIC CONGESTIVE HEART FAILURE (H): Primary | ICD-10-CM

## 2020-03-30 LAB
INR PPP: 3.39 (ref 0.86–1.14)
MDC_IDC_EPISODE_DTM: NORMAL
MDC_IDC_EPISODE_DURATION: 1440 S
MDC_IDC_EPISODE_DURATION: 480 S
MDC_IDC_EPISODE_DURATION: 600 S
MDC_IDC_EPISODE_DURATION: 840 S
MDC_IDC_EPISODE_ID: 1
MDC_IDC_EPISODE_ID: 2
MDC_IDC_EPISODE_ID: 3
MDC_IDC_EPISODE_ID: 4
MDC_IDC_EPISODE_TYPE: NORMAL
MDC_IDC_LEAD_IMPLANT_DT: NORMAL
MDC_IDC_LEAD_LOCATION: NORMAL
MDC_IDC_LEAD_LOCATION_DETAIL_1: NORMAL
MDC_IDC_LEAD_MFG: NORMAL
MDC_IDC_LEAD_MODEL: NORMAL
MDC_IDC_LEAD_POLARITY_TYPE: NORMAL
MDC_IDC_LEAD_SERIAL: NORMAL
MDC_IDC_MSMT_BATTERY_DTM: NORMAL
MDC_IDC_MSMT_BATTERY_RRT_TRIGGER: 2.73
MDC_IDC_MSMT_BATTERY_STATUS: NORMAL
MDC_IDC_MSMT_BATTERY_VOLTAGE: 3.07 V
MDC_IDC_MSMT_LEADCHNL_RV_IMPEDANCE_VALUE: 361 OHM
MDC_IDC_MSMT_LEADCHNL_RV_IMPEDANCE_VALUE: 456 OHM
MDC_IDC_MSMT_LEADCHNL_RV_PACING_THRESHOLD_AMPLITUDE: 0.5 V
MDC_IDC_MSMT_LEADCHNL_RV_PACING_THRESHOLD_PULSEWIDTH: 0.4 MS
MDC_IDC_MSMT_LEADCHNL_RV_SENSING_INTR_AMPL: 15 MV
MDC_IDC_PG_IMPLANT_DTM: NORMAL
MDC_IDC_PG_MFG: NORMAL
MDC_IDC_PG_MODEL: NORMAL
MDC_IDC_PG_SERIAL: NORMAL
MDC_IDC_PG_TYPE: NORMAL
MDC_IDC_SESS_CLINIC_NAME: NORMAL
MDC_IDC_SESS_DTM: NORMAL
MDC_IDC_SESS_TYPE: NORMAL
MDC_IDC_SET_BRADY_HYSTRATE: NORMAL
MDC_IDC_SET_BRADY_LOWRATE: 40 {BEATS}/MIN
MDC_IDC_SET_BRADY_MODE: NORMAL
MDC_IDC_SET_LEADCHNL_RV_PACING_AMPLITUDE: 3.5 V
MDC_IDC_SET_LEADCHNL_RV_PACING_ANODE_ELECTRODE_1: NORMAL
MDC_IDC_SET_LEADCHNL_RV_PACING_ANODE_LOCATION_1: NORMAL
MDC_IDC_SET_LEADCHNL_RV_PACING_CAPTURE_MODE: NORMAL
MDC_IDC_SET_LEADCHNL_RV_PACING_CATHODE_ELECTRODE_1: NORMAL
MDC_IDC_SET_LEADCHNL_RV_PACING_CATHODE_LOCATION_1: NORMAL
MDC_IDC_SET_LEADCHNL_RV_PACING_POLARITY: NORMAL
MDC_IDC_SET_LEADCHNL_RV_PACING_PULSEWIDTH: 0.4 MS
MDC_IDC_SET_LEADCHNL_RV_SENSING_ANODE_ELECTRODE_1: NORMAL
MDC_IDC_SET_LEADCHNL_RV_SENSING_ANODE_LOCATION_1: NORMAL
MDC_IDC_SET_LEADCHNL_RV_SENSING_CATHODE_ELECTRODE_1: NORMAL
MDC_IDC_SET_LEADCHNL_RV_SENSING_CATHODE_LOCATION_1: NORMAL
MDC_IDC_SET_LEADCHNL_RV_SENSING_POLARITY: NORMAL
MDC_IDC_SET_LEADCHNL_RV_SENSING_SENSITIVITY: 0.3 MV
MDC_IDC_SET_ZONE_DETECTION_BEATS_DENOMINATOR: 40 {BEATS}
MDC_IDC_SET_ZONE_DETECTION_BEATS_NUMERATOR: 30 {BEATS}
MDC_IDC_SET_ZONE_DETECTION_INTERVAL: 270 MS
MDC_IDC_SET_ZONE_DETECTION_INTERVAL: 340 MS
MDC_IDC_SET_ZONE_DETECTION_INTERVAL: 360 MS
MDC_IDC_SET_ZONE_DETECTION_INTERVAL: NORMAL
MDC_IDC_SET_ZONE_TYPE: NORMAL
MDC_IDC_STAT_AT_BURDEN_PERCENT: 5.4 %
MDC_IDC_STAT_AT_DTM_END: NORMAL
MDC_IDC_STAT_AT_DTM_START: NORMAL
MDC_IDC_STAT_BRADY_DTM_END: NORMAL
MDC_IDC_STAT_BRADY_DTM_START: NORMAL
MDC_IDC_STAT_BRADY_RV_PERCENT_PACED: 0.01 %
MDC_IDC_STAT_EPISODE_RECENT_COUNT: 0
MDC_IDC_STAT_EPISODE_RECENT_COUNT: 4
MDC_IDC_STAT_EPISODE_RECENT_COUNT_DTM_END: NORMAL
MDC_IDC_STAT_EPISODE_RECENT_COUNT_DTM_START: NORMAL
MDC_IDC_STAT_EPISODE_TOTAL_COUNT: 0
MDC_IDC_STAT_EPISODE_TOTAL_COUNT: 4
MDC_IDC_STAT_EPISODE_TOTAL_COUNT_DTM_END: NORMAL
MDC_IDC_STAT_EPISODE_TOTAL_COUNT_DTM_START: NORMAL
MDC_IDC_STAT_EPISODE_TYPE: NORMAL
MDC_IDC_STAT_TACHYTHERAPY_ATP_DELIVERED_RECENT: 0
MDC_IDC_STAT_TACHYTHERAPY_ATP_DELIVERED_TOTAL: 0
MDC_IDC_STAT_TACHYTHERAPY_RECENT_DTM_END: NORMAL
MDC_IDC_STAT_TACHYTHERAPY_RECENT_DTM_START: NORMAL
MDC_IDC_STAT_TACHYTHERAPY_SHOCKS_ABORTED_RECENT: 0
MDC_IDC_STAT_TACHYTHERAPY_SHOCKS_ABORTED_TOTAL: 0
MDC_IDC_STAT_TACHYTHERAPY_SHOCKS_DELIVERED_RECENT: 0
MDC_IDC_STAT_TACHYTHERAPY_SHOCKS_DELIVERED_TOTAL: 0
MDC_IDC_STAT_TACHYTHERAPY_TOTAL_DTM_END: NORMAL
MDC_IDC_STAT_TACHYTHERAPY_TOTAL_DTM_START: NORMAL

## 2020-03-30 NOTE — RESULT ENCOUNTER NOTE
Patient followed in the Transplant clinic.  Transplant RN Coordinator to follow up.    Char Cagle RN on 3/30/2020 at 12:23 PM

## 2020-03-30 NOTE — PROGRESS NOTES
ANTICOAGULATION FOLLOW-UP CLINIC VISIT    Patient Name:  Eliseo Tanner  Date:  3/30/2020  Contact Type:  Telephone    SUBJECTIVE:  Patient Findings     Positives:   Change in diet/appetite (Drinking one can of Protein drink /day)             OBJECTIVE    INR   Date Value Ref Range Status   03/30/2020 3.39 (H) 0.86 - 1.14 Final     Chromogenic Factor 10   Date Value Ref Range Status   03/16/2020 27 (L) 70 - 130 % Final     Comment:     Therapeutic Range:  A Chromogenic Factor 10 level of approximately 20-40%   inversely correlates with an INR of 2-3 for patients receiving Warfarin.   Chromogenic Factor 10 levels below 20% indicate an INR greater than 3 and   levels above 40% indicate an INR less than 2.         ASSESSMENT / PLAN  INR assessment SUPRA    Recheck INR In: 3 DAYS    INR Location Clinic      Anticoagulation Summary  As of 3/30/2020    INR goal:   2.0-3.0   TTR:   71.3 % (4 d)   INR used for dosing:   3.39! (3/30/2020)   Warfarin maintenance plan:   No maintenance plan   Full warfarin instructions:   3/30: 4 mg; 3/31: 4 mg; 4/1: 6 mg; Otherwise No maintenance plan   Next INR check:   4/2/2020   Priority:   Critical   Target end date:   Indefinite    Indications    LVAD (left ventricular assist device) present (H) [Z95.811]             Anticoagulation Episode Summary     INR check location:       Preferred lab:       Send INR reminders to:   [facility]    Comments:   Patient is staying at [facility].  Patient on Amiodarone  HM 3  placed 2/18/2020, Speak to Veronique ramírez at [phone]      Anticoagulation Care Providers     Provider Role Specialty Phone number    Gucci Tomas MD Referring Cardiology [phone]            See the Encounter Report to view Anticoagulation Flowsheet and Dosing Calendar (Go to Encounters tab in chart review, and find the Anticoagulation Therapy Visit)  Spoke with Chapis.  Patient had LVAD placed on:   2/18/2020  Type of LVAD: HM3  Patient's current  Aspirin dose: no aspirin  LVAD Protocol followed:  Yes  Destiny Rogel RN

## 2020-03-30 NOTE — RESULT ENCOUNTER NOTE
Patient followed in the Transplant clinic.  Transplant RN Coordinator to follow up.    Char Cagle RN on 3/30/2020 at 12:25 PM

## 2020-04-02 ENCOUNTER — OFFICE VISIT (OUTPATIENT)
Dept: CARDIOLOGY | Facility: CLINIC | Age: 65
End: 2020-04-02
Attending: INTERNAL MEDICINE
Payer: MEDICARE

## 2020-04-02 ENCOUNTER — ANTICOAGULATION THERAPY VISIT (OUTPATIENT)
Dept: ANTICOAGULATION | Facility: CLINIC | Age: 65
End: 2020-04-02

## 2020-04-02 ENCOUNTER — ANCILLARY PROCEDURE (OUTPATIENT)
Dept: GENERAL RADIOLOGY | Facility: CLINIC | Age: 65
End: 2020-04-02
Attending: PHYSICIAN ASSISTANT
Payer: MEDICARE

## 2020-04-02 ENCOUNTER — TELEPHONE (OUTPATIENT)
Dept: CARDIOLOGY | Facility: CLINIC | Age: 65
End: 2020-04-02

## 2020-04-02 VITALS
HEIGHT: 67 IN | WEIGHT: 195.6 LBS | TEMPERATURE: 98.3 F | SYSTOLIC BLOOD PRESSURE: 94 MMHG | OXYGEN SATURATION: 97 % | BODY MASS INDEX: 30.7 KG/M2

## 2020-04-02 VITALS
OXYGEN SATURATION: 97 % | BODY MASS INDEX: 30.7 KG/M2 | WEIGHT: 195.6 LBS | SYSTOLIC BLOOD PRESSURE: 94 MMHG | HEART RATE: 98 BPM | TEMPERATURE: 98.3 F | HEIGHT: 67 IN

## 2020-04-02 DIAGNOSIS — I50.22 CHRONIC SYSTOLIC CONGESTIVE HEART FAILURE (H): ICD-10-CM

## 2020-04-02 DIAGNOSIS — I48.0 PAROXYSMAL ATRIAL FIBRILLATION (H): ICD-10-CM

## 2020-04-02 DIAGNOSIS — Z95.810 ICD (IMPLANTABLE CARDIOVERTER-DEFIBRILLATOR) IN PLACE: ICD-10-CM

## 2020-04-02 DIAGNOSIS — Z95.811 LVAD (LEFT VENTRICULAR ASSIST DEVICE) PRESENT (H): ICD-10-CM

## 2020-04-02 DIAGNOSIS — I50.22 CHRONIC SYSTOLIC CONGESTIVE HEART FAILURE (H): Primary | ICD-10-CM

## 2020-04-02 DIAGNOSIS — I46.9 CARDIAC ARREST (H): ICD-10-CM

## 2020-04-02 DIAGNOSIS — I10 BENIGN ESSENTIAL HYPERTENSION: ICD-10-CM

## 2020-04-02 LAB
ALBUMIN SERPL-MCNC: 3.1 G/DL (ref 3.4–5)
ALP SERPL-CCNC: 164 U/L (ref 40–150)
ALT SERPL W P-5'-P-CCNC: 37 U/L (ref 0–70)
ANION GAP SERPL CALCULATED.3IONS-SCNC: 9 MMOL/L (ref 3–14)
AST SERPL W P-5'-P-CCNC: 31 U/L (ref 0–45)
BILIRUB SERPL-MCNC: 0.5 MG/DL (ref 0.2–1.3)
BUN SERPL-MCNC: 35 MG/DL (ref 7–30)
CALCIUM SERPL-MCNC: 8.9 MG/DL (ref 8.5–10.1)
CHLORIDE SERPL-SCNC: 103 MMOL/L (ref 94–109)
CO2 SERPL-SCNC: 24 MMOL/L (ref 20–32)
CREAT SERPL-MCNC: 1.38 MG/DL (ref 0.66–1.25)
D DIMER PPP FEU-MCNC: 10.9 UG/ML FEU (ref 0–0.5)
ERYTHROCYTE [DISTWIDTH] IN BLOOD BY AUTOMATED COUNT: 18.1 % (ref 10–15)
GFR SERPL CREATININE-BSD FRML MDRD: 53 ML/MIN/{1.73_M2}
GLUCOSE SERPL-MCNC: 187 MG/DL (ref 70–99)
HCT VFR BLD AUTO: 33.4 % (ref 40–53)
HGB BLD-MCNC: 9.5 G/DL (ref 13.3–17.7)
INR PPP: 3.74 (ref 0.86–1.14)
LDH SERPL L TO P-CCNC: 322 U/L (ref 85–227)
MCH RBC QN AUTO: 24.4 PG (ref 26.5–33)
MCHC RBC AUTO-ENTMCNC: 28.4 G/DL (ref 31.5–36.5)
MCV RBC AUTO: 86 FL (ref 78–100)
MDC_IDC_EPISODE_DTM: NORMAL
MDC_IDC_EPISODE_DURATION: 1200 S
MDC_IDC_EPISODE_DURATION: 1320 S
MDC_IDC_EPISODE_DURATION: 1560 S
MDC_IDC_EPISODE_DURATION: 1920 S
MDC_IDC_EPISODE_DURATION: 3360 S
MDC_IDC_EPISODE_DURATION: 360 S
MDC_IDC_EPISODE_DURATION: 480 S
MDC_IDC_EPISODE_DURATION: 600 S
MDC_IDC_EPISODE_DURATION: 720 S
MDC_IDC_EPISODE_DURATION: 960 S
MDC_IDC_EPISODE_DURATION: 960 S
MDC_IDC_EPISODE_ID: 32
MDC_IDC_EPISODE_ID: 55
MDC_IDC_EPISODE_ID: 56
MDC_IDC_EPISODE_ID: 57
MDC_IDC_EPISODE_ID: 58
MDC_IDC_EPISODE_ID: 59
MDC_IDC_EPISODE_ID: 60
MDC_IDC_EPISODE_ID: 61
MDC_IDC_EPISODE_ID: 62
MDC_IDC_EPISODE_ID: 63
MDC_IDC_EPISODE_ID: 64
MDC_IDC_EPISODE_ID: 65
MDC_IDC_EPISODE_ID: 66
MDC_IDC_EPISODE_ID: 67
MDC_IDC_EPISODE_ID: 68
MDC_IDC_EPISODE_ID: 69
MDC_IDC_EPISODE_ID: 70
MDC_IDC_EPISODE_ID: 71
MDC_IDC_EPISODE_ID: 72
MDC_IDC_EPISODE_ID: 73
MDC_IDC_EPISODE_ID: 74
MDC_IDC_EPISODE_TYPE: NORMAL
MDC_IDC_LEAD_IMPLANT_DT: NORMAL
MDC_IDC_LEAD_LOCATION: NORMAL
MDC_IDC_LEAD_LOCATION_DETAIL_1: NORMAL
MDC_IDC_LEAD_MFG: NORMAL
MDC_IDC_LEAD_MODEL: NORMAL
MDC_IDC_LEAD_POLARITY_TYPE: NORMAL
MDC_IDC_LEAD_SERIAL: NORMAL
MDC_IDC_LEAD_SPECIAL_FUNCTION: NORMAL
MDC_IDC_MSMT_BATTERY_DTM: NORMAL
MDC_IDC_MSMT_BATTERY_REMAINING_LONGEVITY: 137 MO
MDC_IDC_MSMT_BATTERY_RRT_TRIGGER: 2.73
MDC_IDC_MSMT_BATTERY_STATUS: NORMAL
MDC_IDC_MSMT_BATTERY_VOLTAGE: 3.15 V
MDC_IDC_MSMT_LEADCHNL_RV_IMPEDANCE_VALUE: 361 OHM
MDC_IDC_MSMT_LEADCHNL_RV_IMPEDANCE_VALUE: 456 OHM
MDC_IDC_MSMT_LEADCHNL_RV_PACING_THRESHOLD_AMPLITUDE: 0.5 V
MDC_IDC_MSMT_LEADCHNL_RV_PACING_THRESHOLD_PULSEWIDTH: 0.4 MS
MDC_IDC_MSMT_LEADCHNL_RV_SENSING_INTR_AMPL: 11.75 MV
MDC_IDC_MSMT_LEADCHNL_RV_SENSING_INTR_AMPL: 11.75 MV
MDC_IDC_PG_IMPLANT_DTM: NORMAL
MDC_IDC_PG_MFG: NORMAL
MDC_IDC_PG_MODEL: NORMAL
MDC_IDC_PG_SERIAL: NORMAL
MDC_IDC_PG_TYPE: NORMAL
MDC_IDC_SESS_CLINIC_NAME: NORMAL
MDC_IDC_SESS_DTM: NORMAL
MDC_IDC_SESS_TYPE: NORMAL
MDC_IDC_SET_BRADY_HYSTRATE: NORMAL
MDC_IDC_SET_BRADY_LOWRATE: 40 {BEATS}/MIN
MDC_IDC_SET_BRADY_MODE: NORMAL
MDC_IDC_SET_LEADCHNL_RV_PACING_AMPLITUDE: 3.5 V
MDC_IDC_SET_LEADCHNL_RV_PACING_ANODE_ELECTRODE_1: NORMAL
MDC_IDC_SET_LEADCHNL_RV_PACING_ANODE_LOCATION_1: NORMAL
MDC_IDC_SET_LEADCHNL_RV_PACING_CAPTURE_MODE: NORMAL
MDC_IDC_SET_LEADCHNL_RV_PACING_CATHODE_ELECTRODE_1: NORMAL
MDC_IDC_SET_LEADCHNL_RV_PACING_CATHODE_LOCATION_1: NORMAL
MDC_IDC_SET_LEADCHNL_RV_PACING_POLARITY: NORMAL
MDC_IDC_SET_LEADCHNL_RV_PACING_PULSEWIDTH: 0.4 MS
MDC_IDC_SET_LEADCHNL_RV_SENSING_ANODE_ELECTRODE_1: NORMAL
MDC_IDC_SET_LEADCHNL_RV_SENSING_ANODE_LOCATION_1: NORMAL
MDC_IDC_SET_LEADCHNL_RV_SENSING_CATHODE_ELECTRODE_1: NORMAL
MDC_IDC_SET_LEADCHNL_RV_SENSING_CATHODE_LOCATION_1: NORMAL
MDC_IDC_SET_LEADCHNL_RV_SENSING_POLARITY: NORMAL
MDC_IDC_SET_LEADCHNL_RV_SENSING_SENSITIVITY: 0.3 MV
MDC_IDC_SET_ZONE_DETECTION_BEATS_DENOMINATOR: 40 {BEATS}
MDC_IDC_SET_ZONE_DETECTION_BEATS_NUMERATOR: 30 {BEATS}
MDC_IDC_SET_ZONE_DETECTION_INTERVAL: 270 MS
MDC_IDC_SET_ZONE_DETECTION_INTERVAL: 340 MS
MDC_IDC_SET_ZONE_DETECTION_INTERVAL: 360 MS
MDC_IDC_SET_ZONE_DETECTION_INTERVAL: NORMAL
MDC_IDC_SET_ZONE_TYPE: NORMAL
MDC_IDC_STAT_AT_BURDEN_PERCENT: 3.1 %
MDC_IDC_STAT_AT_DTM_END: NORMAL
MDC_IDC_STAT_AT_DTM_START: NORMAL
MDC_IDC_STAT_BRADY_DTM_END: NORMAL
MDC_IDC_STAT_BRADY_DTM_START: NORMAL
MDC_IDC_STAT_BRADY_RV_PERCENT_PACED: 0.01 %
MDC_IDC_STAT_EPISODE_RECENT_COUNT: 0
MDC_IDC_STAT_EPISODE_RECENT_COUNT: 43
MDC_IDC_STAT_EPISODE_RECENT_COUNT_DTM_END: NORMAL
MDC_IDC_STAT_EPISODE_RECENT_COUNT_DTM_START: NORMAL
MDC_IDC_STAT_EPISODE_TOTAL_COUNT: 0
MDC_IDC_STAT_EPISODE_TOTAL_COUNT: 74
MDC_IDC_STAT_EPISODE_TOTAL_COUNT_DTM_END: NORMAL
MDC_IDC_STAT_EPISODE_TOTAL_COUNT_DTM_START: NORMAL
MDC_IDC_STAT_EPISODE_TYPE: NORMAL
MDC_IDC_STAT_TACHYTHERAPY_ATP_DELIVERED_RECENT: 0
MDC_IDC_STAT_TACHYTHERAPY_ATP_DELIVERED_TOTAL: 0
MDC_IDC_STAT_TACHYTHERAPY_RECENT_DTM_END: NORMAL
MDC_IDC_STAT_TACHYTHERAPY_RECENT_DTM_START: NORMAL
MDC_IDC_STAT_TACHYTHERAPY_SHOCKS_ABORTED_RECENT: 0
MDC_IDC_STAT_TACHYTHERAPY_SHOCKS_ABORTED_TOTAL: 0
MDC_IDC_STAT_TACHYTHERAPY_SHOCKS_DELIVERED_RECENT: 0
MDC_IDC_STAT_TACHYTHERAPY_SHOCKS_DELIVERED_TOTAL: 0
MDC_IDC_STAT_TACHYTHERAPY_TOTAL_DTM_END: NORMAL
MDC_IDC_STAT_TACHYTHERAPY_TOTAL_DTM_START: NORMAL
PLATELET # BLD AUTO: 479 10E9/L (ref 150–450)
POTASSIUM SERPL-SCNC: 4.2 MMOL/L (ref 3.4–5.3)
PROT SERPL-MCNC: 8.4 G/DL (ref 6.8–8.8)
RBC # BLD AUTO: 3.89 10E12/L (ref 4.4–5.9)
SODIUM SERPL-SCNC: 136 MMOL/L (ref 133–144)
WBC # BLD AUTO: 9.1 10E9/L (ref 4–11)

## 2020-04-02 PROCEDURE — 80053 COMPREHEN METABOLIC PANEL: CPT | Performed by: PHYSICIAN ASSISTANT

## 2020-04-02 PROCEDURE — 85610 PROTHROMBIN TIME: CPT | Performed by: PHYSICIAN ASSISTANT

## 2020-04-02 PROCEDURE — 99215 OFFICE O/P EST HI 40 MIN: CPT | Mod: 25 | Performed by: PHYSICIAN ASSISTANT

## 2020-04-02 PROCEDURE — 85027 COMPLETE CBC AUTOMATED: CPT | Performed by: PHYSICIAN ASSISTANT

## 2020-04-02 PROCEDURE — 85379 FIBRIN DEGRADATION QUANT: CPT | Performed by: PHYSICIAN ASSISTANT

## 2020-04-02 PROCEDURE — 93296 REM INTERROG EVL PM/IDS: CPT | Mod: ZF

## 2020-04-02 PROCEDURE — 93750 INTERROGATION VAD IN PERSON: CPT | Mod: ZF | Performed by: PHYSICIAN ASSISTANT

## 2020-04-02 PROCEDURE — 36415 COLL VENOUS BLD VENIPUNCTURE: CPT | Performed by: PHYSICIAN ASSISTANT

## 2020-04-02 PROCEDURE — 99207 CARDIAC DEVICE CHECK - REMOTE: CPT | Mod: ZP | Performed by: INTERNAL MEDICINE

## 2020-04-02 PROCEDURE — G0463 HOSPITAL OUTPT CLINIC VISIT: HCPCS | Mod: 25,ZF

## 2020-04-02 PROCEDURE — 83615 LACTATE (LD) (LDH) ENZYME: CPT | Performed by: PHYSICIAN ASSISTANT

## 2020-04-02 RX ORDER — HYDRALAZINE HYDROCHLORIDE 100 MG/1
100 TABLET, FILM COATED ORAL 3 TIMES DAILY
Qty: 270 TABLET | Refills: 3 | Status: SHIPPED | OUTPATIENT
Start: 2020-04-02 | End: 2020-06-26 | Stop reason: DRUGHIGH

## 2020-04-02 RX ORDER — WARFARIN SODIUM 4 MG/1
TABLET ORAL
Qty: 90 TABLET | Refills: 3 | Status: SHIPPED | OUTPATIENT
Start: 2020-04-02 | End: 2020-04-03

## 2020-04-02 RX ORDER — FUROSEMIDE 20 MG
20 TABLET ORAL DAILY
Qty: 90 TABLET | Refills: 3 | Status: SHIPPED | OUTPATIENT
Start: 2020-04-02 | End: 2020-05-26

## 2020-04-02 ASSESSMENT — MIFFLIN-ST. JEOR
SCORE: 1625.87
SCORE: 1625.87

## 2020-04-02 ASSESSMENT — PAIN SCALES - GENERAL
PAINLEVEL: NO PAIN (0)
PAINLEVEL: NO PAIN (0)

## 2020-04-02 NOTE — TELEPHONE ENCOUNTER
[facility] Call Center    Phone Message    May a detailed message be left on voicemail: yes     Reason for Call: Medication Question or concern regarding medication   Prescription Clarification  Name of Medication: furosemide (LASIX) 20 MG tablet  Prescribing Provider: Mervat MATHIAS   Pharmacy: On File    What on the order needs clarification? Pt is already taking 40mg a day but thought he was only taking 20mg. Pt wants to know if he should still take extra pill each day. Please advise.           Action Taken: Message routed to:  [facility] Cardio    Travel Screening: Not Applicable

## 2020-04-02 NOTE — PATIENT INSTRUCTIONS
Medications:  1.Increase lasix to     Follow-up:  1.    Instructions:  1.     Page the VAD Coordinator on call if you gain more than 3 lb in a day or 5 in a week. Please also page if you feel unwell or have alarms.     Great to see you in clinic today. To Page the VAD Coordinator on call, dial [phone] option #4 and ask to speak to the VAD coordinator on call.

## 2020-04-02 NOTE — NURSING NOTE
1). PUMP DATA  Primary controller serial number: [id number]      HM 3:   Flow: 4.5 L/min,    Speed: 5500 RPMs,     PI: 3.3 ,  Power: 4.6 Driver, Hct: 28     Primary controller   Back up battery: Patient use: 9, Replace in: 20  Months     Data downloaded: No   Equipment and driveline assessed for damage: Yes     Back up : Serial number: [id number]  Back up battery: Patient use: 11 Replace in: 30  Months  Programmed settings identical to the settings on the primary controller : N/A      Education complete: Yes   Charge the BACKUP controller s backup battery every 6 months  Perform a self test on BACKUP every 6 months  Change the MPU s batteries every 6 months:Yes      2). ALARMS  Alarms reported by patient since last pump evaluation: No  Alarms or other finding noted during pump data history and alarm download: No    Action Taken:  Reviewed data with patient: Yes      3). DRESSING CHANGE / DRIVELINE ASSESSMENT  Dressing change completed today: Yes  Appearance of Driveline site: Small amt of scab.  Slightly pink.  Suture cut.    Driveline stabilization: Method: [employer]  [ Teaching reinforced on need for stabilization of Driveline. ]

## 2020-04-02 NOTE — TELEPHONE ENCOUNTER
Called patient back:   Will adjust plan:  For three days tony lasix 40 mg in the monring and 20 mg in the afternoon.  Take an extra 20 meq of Kcl today, otherwise take his regular 20 meq Kcl daily.         Mervat Decker PA-C

## 2020-04-02 NOTE — NURSING NOTE
Chief Complaint   Patient presents with     Follow Up     VAD follow up     Vitals were taken and medications were reconciled.    Kay Fisher  9:00 AM

## 2020-04-02 NOTE — PATIENT INSTRUCTIONS
2 view chest X-ray today    Medication changes:   - 3 days of lasix 40 mg in the morning and 20 mg in the afternoon  - Today take an extra 20 meq of Potassium today only  - Then go back to potassium 20 meq daily  - After 3 days Then go back to lasix 40 mg once a day   - Start taking hydralazine three times a day    Follow-up:  - [facility] Monday  - Dr. Cisneros as scheduled next week

## 2020-04-02 NOTE — PROGRESS NOTES
"CVTS Followup Note  Mr. Tanner is well known to the CV service.  He had HM3 implant on 2/18/2020.  Post-operatively he has done well.  He is currently staying at the [facility].  His rehab efforts are going well.  He denies significant pain.  He is able to participate in therapies and walking.  He is tolerating diet.  No issues with anticoagulation.  He is looking forward to returning home in a few weeks.    BP (!) 94/0 (BP Location: Right arm, Patient Position: Chair, Cuff Size: Adult Regular)   Temp 98.3  F (36.8  C)   Ht 1.702 m (5' 7\")   Wt 88.7 kg (195 lb 9.6 oz)   SpO2 97%   BMI 30.64 kg/m      Telephone encounter - exam deferred    A/P: s/p HM3 implant  - OK to discharge home once therapy stay is completed  - Please call with questions / concerns    Mac Jaramillo  Cardiothoracic surgery  [phone]      "

## 2020-04-02 NOTE — PROGRESS NOTES
ANTICOAGULATION FOLLOW-UP CLINIC VISIT    Patient Name:  Eliseo Tanner  Date:  4/2/2020  Contact Type:  Telephone    SUBJECTIVE:  Patient Findings     Comments:   Patient is retaining fluid and this could be the reason INR was elevated today.  Lasix dose was increased for the next few days.         Clinical Outcomes     Comments:   Patient is retaining fluid and this could be the reason INR was elevated today.  Lasix dose was increased for the next few days.            OBJECTIVE    INR   Date Value Ref Range Status   04/02/2020 3.74 (H) 0.86 - 1.14 Final     Chromogenic Factor 10   Date Value Ref Range Status   03/16/2020 27 (L) 70 - 130 % Final     Comment:     Therapeutic Range:  A Chromogenic Factor 10 level of approximately 20-40%   inversely correlates with an INR of 2-3 for patients receiving Warfarin.   Chromogenic Factor 10 levels below 20% indicate an INR greater than 3 and   levels above 40% indicate an INR less than 2.         ASSESSMENT / PLAN  No question data found.  Anticoagulation Summary  As of 4/2/2020    INR goal:   2.0-3.0   TTR:   42.8 % (1 wk)   INR used for dosing:   3.74! (4/2/2020)   Warfarin maintenance plan:   No maintenance plan   Full warfarin instructions:   4/2: 2 mg; 4/3: 4 mg; 4/4: 6 mg; 4/5: 6 mg; Otherwise No maintenance plan   Next INR check:   4/6/2020   Priority:   Critical   Target end date:   Indefinite    Indications    LVAD (left ventricular assist device) present (H) [Z95.811]             Anticoagulation Episode Summary     INR check location:       Preferred lab:       Send INR reminders to:   [facility]    Comments:   Patient is staying at [facility].  Patient on Amiodarone  HM 3  placed 2/18/2020, Speak to spouse, Veronique at [phone]      Anticoagulation Care Providers     Provider Role Specialty Phone number    Gucci Tomas MD Referring Cardiology [phone]            See the Encounter Report to view Anticoagulation Flowsheet and Dosing  Calendar (Go to Encounters tab in chart review, and find the Anticoagulation Therapy Visit)    Spoke with patient and spouse.     Gloria Abbasi RN

## 2020-04-02 NOTE — LETTER
4/2/2020      RE: Eliseo Tanner  [address] [address]       Dear Colleague,    Thank you for the opportunity to participate in the care of your patient, Eliseo Tanner, at the [facility]. Please see a copy of my visit note below.    HPI:   Eliseo Tanner is a 64 year old male with chronic systolic heart failure secondary to NICM now s/p HM 3 on 2/18, moderate CAD, HTN, ABHINAV on CPAP, DM2, CKD Stage III, ANA. His HM3 post-op course was complicated by retrosternal hematoma and bleeding in the lungs, RV failure,VT in ICU now on amiodarone and Afib w/AVR S/p DCCV on 2/28. He had pre-op proteus and enterococcus bacteremia from 2/13, s/p abx. He had an ICD placed on 3/16/2020. This is his first clinic visit. This was an in person visit.    Bon is been feeling quite well.  He has no shortness of breath at rest.  Can walk 3-4 blocks before he would need a break.  This is improving.  No swelling in his legs.  No swelling in his belly.  No orthopnea or PND.  He had one episode of dizziness last week which by history sounds very vasovagal, occurred after going to the bathroom, dizziness followed by sweats, took 20 to 40 minutes to feel better.  No palpitations.  No chest pain.  His appetite is good.  No blood in the urine or blood in the stool.  No prolonged nosebleeds.  No hemoptysis.  No driveline erythema, drainage, pain.  No stroke symptoms.    His weights have been stable around 192 since coming home.     He lives in [place]. He has previously had [place] cardiolog[place] [facility]. That is only 80 miles from his house.    Cardiac Medications  ASA 81 mg daily  Coumadin  Amiodarone 200 mg daily- for post-op VT s/p external shock  Atorvastatin 20 mg daily  Digoxin 125 mcg daily  Lasix 40 mg daily  Kcl 20 meq daily  Hydralazine 100 mg TID- has only been taking twice  Lisinopril 10 mg daily    PAST MEDICAL HISTORY:  No past medical history  on file.    FAMILY HISTORY:  No family history on file.    SOCIAL HISTORY:  Social History     Socioeconomic History     Marital status:      Spouse name: Not on file     Number of children: Not on file     Years of education: Not on file     Highest education level: Not on file   Occupational History     Not on file   Social Needs     Financial resource strain: Not on file     Food insecurity     Worry: Not on file     Inability: Not on file     Transportation needs     Medical: Not on file     Non-medical: Not on file   Tobacco Use     Smoking status: Not on file   Substance and Sexual Activity     Alcohol use: Not on file     Drug use: Not on file     Sexual activity: Not on file   Lifestyle     Physical activity     Days per week: Not on file     Minutes per session: Not on file     Stress: Not on file   Relationships     Social connections     Talks on phone: Not on file     Gets together: Not on file     Attends [employer]ism service: Not on file     Active member of club or organization: Not on file     Attends meetings of clubs or organizations: Not on file     Relationship status: Not on file     Intimate partner violence     Fear of current or ex partner: Not on file     Emotionally abused: Not on file     Physically abused: Not on file     Forced sexual activity: Not on file   Other Topics Concern     Not on file   Social History Narrative     Not on file       CURRENT MEDICATIONS:  acetaminophen (TYLENOL) 325 MG tablet, Take 2 tablets (650 mg) by mouth every 4 hours as needed for mild pain or fever  albuterol (PROAIR HFA/PROVENTIL HFA/VENTOLIN HFA) 108 (90 Base) MCG/ACT inhaler, Inhale 2 puffs into the lungs every 6 hours as needed for wheezing  allopurinol (ZYLOPRIM) 100 MG tablet, 2 tablets (200 mg) by Oral or Feeding Tube route daily  amiodarone (PACERONE) 200 MG tablet, Take 1 tablet (200 mg) by mouth daily  aspirin (ASA) 81 MG EC tablet, Take 1 tablet (81 mg) by mouth daily  atorvastatin  (LIPITOR) 20 MG tablet, 1 tablet (20 mg) by Oral or Feeding Tube route every evening  co-enzyme Q-10 200 MG CAPS, 200 mg by Oral or Feeding Tube route daily  diclofenac (VOLTAREN) 1 % topical gel, Apply 4 g topically 4 times daily as needed for moderate pain  digoxin (LANOXIN) 125 MCG tablet, Take 1 tablet (125 mcg) by mouth daily  FLUoxetine (PROZAC) 20 MG capsule, Take 1 capsule (20 mg) by mouth daily  insulin glargine (LANTUS PEN) 100 UNIT/ML pen, Inject 10 Units Subcutaneous At Bedtime  insulin pen needle (BD JAIME U/F) 32G X 4 MM miscellaneous,   lisinopril (ZESTRIL) 10 MG tablet, Take 1 tablet (10 mg) by mouth daily  magnesium oxide (MAG-OX) 400 MG tablet, 1 tablet (400 mg) by Oral or Feeding Tube route daily  methocarbamol (ROBAXIN) 500 MG tablet, Take 1 tablet (500 mg) by mouth 4 times daily as needed for muscle spasms  MULTIPLE MINERALS-VITAMINS PO, Take 1 tablet by mouth daily  multivitamin w/minerals (THERA-VIT-M) tablet, Take 1 tablet by mouth daily  ondansetron (ZOFRAN) 4 MG tablet, Take 4 mg by mouth every 4 hours as needed for nausea  pantoprazole (PROTONIX) 40 MG EC tablet, Take 1 tablet (40 mg) by mouth every morning (before breakfast)  potassium chloride ER (KLOR-CON M) 20 MEQ CR tablet, Take 1 tablet (20 mEq) by mouth daily  senna-docusate (SENOKOT-S/PERICOLACE) 8.6-50 MG tablet, 1 tablet by Oral or Feeding Tube route 2 times daily as needed for constipation  [] cephALEXin (KEFLEX) 500 MG capsule, Take 1 capsule (500 mg) by mouth 3 times daily for 5 days  [] cephalexin 500 MG tablet, Take 500 mg by mouth 3 times daily for 5 days    No current facility-administered medications on file prior to visit.       ROS:   CONSTITUTIONAL: Denies fever, chills, fatigue.  HEENT: Denies headache, vision changes, and changes in speech.   CV: Refer to HPI.   PULMONARY:Refer to HPI.   GI:Denies nausea, vomiting, diarrhea, and abdominal pain. Bowel movements are regular.   :Denies urinary  "alterations, dysuria, urinary frequency, hematuria, and abnormal drainage.   EXT:Denies lower extremity edema.   SKIN:Denies abnormal rashes or lesions.   MUSCULOSKELETAL:Denies upper or lower extremity weakness and pain.   NEUROLOGIC:Denies seizures, or upper or lower extremity paresthesia.     EXAM:  BP (!) 94/0 (BP Location: Right arm, Patient Position: Chair, Cuff Size: Adult Regular)   Pulse 98   Temp 98.3  F (36.8  C) (Oral)   Ht 1.702 m (5' 7\")   Wt 88.7 kg (195 lb 9.6 oz)   SpO2 97%   BMI 30.64 kg/m      GENERAL: Appears comfortable, in no distress, speaking in full sentances and able to communicate all needs.  HEENT: Eye symmetrical and without discharge or icterus bilaterally. Mucous membranes moist and without lesions.  NECK: Supple, JVD >10.   CV: Hum of HM3  RESPIRATORY: Respirations regular, even, and unlabored. Decreased lung sounds at right lower lung base  GI: Soft and non distended with normoactive bowel sounds present in all quadrants. No tenderness, rebound, guarding. No organomegaly.   EXTREMITIES: No peripheral edema. None pulsatile.   NEUROLOGIC: Alert and interacting appropiratly No focal deficits.   MUSCULOSKELETAL: No joint swelling or tenderness.   SKIN: No jaundice. No rashes or lesions. Driveline dressing c/d/i. Driveline site per coordinator.    Labs - as reviewed in clinic with patient today:  CBC RESULTS:  Lab Results   Component Value Date    WBC 9.1 04/02/2020    RBC 3.89 (L) 04/02/2020    HGB 9.5 (L) 04/02/2020    HCT 33.4 (L) 04/02/2020    MCV 86 04/02/2020    MCH 24.4 (L) 04/02/2020    MCHC 28.4 (L) 04/02/2020    RDW 18.1 (H) 04/02/2020     (H) 04/02/2020       CMP RESULTS:  Lab Results   Component Value Date     04/02/2020    POTASSIUM 4.2 04/02/2020    CHLORIDE 103 04/02/2020    CO2 24 04/02/2020    ANIONGAP 9 04/02/2020     (H) 04/02/2020    BUN 35 (H) 04/02/2020    CR 1.38 (H) 04/02/2020    GFRESTIMATED 53 (L) 04/02/2020    GFRESTBLACK 61 04/02/2020 " "   CALOS 8.9 04/02/2020    BILITOTAL 0.5 04/02/2020    ALBUMIN 3.1 (L) 04/02/2020    ALKPHOS 164 (H) 04/02/2020    ALT 37 04/02/2020    AST 31 04/02/2020        INR RESULTS:  Lab Results   Component Value Date    INR 3.74 (H) 04/02/2020       Lab Results   Component Value Date    MAG 2.2 03/20/2020     Lab Results   Component Value Date    NTBNPI 12,559 (H) 02/08/2020     No results found for: NTBNP    Diagnostics  ICD Check  Patient initiated remote ICD transmission received and reviewed.  Device transmission sent per MD orders.  Patient has a Medtronic single lead ICD.  Normal ICD function.  43 AF episodes recorded since 3/21/20, longest of 56 minutes, AF burden = 3.1%.  No ventricular arrhythmias recorded.  ICD settings:  Monitor zone @ 176-222 bpm, VF zone @ 222 bpm.   Presenting EGM = Irregular VS @  bpm.    =<0.1%.  OptiVol fluid index is establishing baseline. Estimated battery longevity to PERLA = 11.5 years.   Battery voltage = 3.15V.  No short v-v intervals recorded. Lead trends appear stable.  Patient had an episode of \"dizziness and clamminess over the weekend\" per LVAD coordinatorValeria.  Patient is scheduled for appointment with STEW Pardon today.  Plan for patient to return to clinic in 3 months as scheduled.  DEIDRA Marcos RN.      Remote ICD transmission    CXR 3/9/2020:  1.  Stable supportive lines and tubes.  2.  Cardiomegaly with LVAD placement, stable.  3.  Right apical hemothorax evacuation. Right mid/lower lobe  subsegmental atelectasis, overall stable.     CT Chest 3/4/2020:  1. Grossly stable large right apical hemothorax compared to recent  radiographs. Tiny nondisplaced fractures of the medial right first and  second ribs. Tiny right pneumothorax.   2. Intrafissural position of the right basilar chest tube abutting the  pericardium and with the tip immediately inferior to the right hilar  vasculature.   3. Small left pleural effusion, which also contains hyperdense " fluid  suspicious for small component of left-sided hemothorax.  4. Stable retrosternal hematoma.  5. Cardiomegaly with small pericardial effusion versus  hemopericardium. Stable LVAD and pericardial drain.     Echo 2/25/2020:  Left ventricular size is normal. Severely reduced left ventricular systolic  function. Septum is midline  LVAD inflow or outflow cannulae not visualized. Doppler velocities are not  elevated.  Moderate to severely reduced right ventricular systolic function.  Aortic valve appears closed during the entire cardiac cycle. Trace aortic  insufficiency is present.  IVC is plethoric.  No pericardial effusion present.    Assessment and Plan:   Eliseo Tanner is a 64 year old male with chronic systolic heart failure secondary to NICM now s/p HM 3 on 2/18, moderate CAD, HTN, ABHINAV on CPAP, DM2, CKD Stage III, ANA. His HM3 post-op course was complicated by retrosternal hematoma, RV failure,VT in ICU now on amiodarone and Afib w/AVR S/p DCCV on 2/28. He had pre-op proteus and enterococcus bacteremia from 2/13, s/p abx. He had an ICD placed on 3/16/2020. This is his first clinic visit.    He looks very good in clinic today overall, getting stronger and moving very well. However, he is hypertensive. His neck vein is up, although his weight has been quite stable. We will do a three day burst of extra lasix and recheck labs on Monday to make sure we have not made the Cr worse.      We will also increase hydralazine to three times a day, has only been taking twice a day.    Chronic SCHF secondary to NICM s/p HM III with RV dysfunction. Implanted 2/18 and complicated by bleeding. Echo at 5600 rpm notes mild-moderate RV dysfunction with AV closed and trace AI, current speed 5500 rpm.   Stage D, NYHA Class IIIB  ACEi/ARB Lisinopril 10 mg po daily. Increase Hydralazine 100 mg po TID.   BB Defer s/p recent LVAD w/right heart failure on digoxin  Aldosterone antagonist deferred while other medical therapy is  prioritized  SCD prophylaxis ICD implanted 3/16.`  Fluid status Hypervolemic: Lasix 40/20 x3 days. Then back down to 40 mg daily. Repeat labs Monday. Will take 20 meq extra kcl today otherwise continue kcl 20 meq daily throughout  MAP: 94- increasing hydralazine  LDH: 322 stable  Anticoagulation: Coumadin per pharmacy. INR-3.74, Goal 2-3.  Antiplatelet: ASA 81 mg po daily  - Continue Digoxin for RV support.  - Will plan for Speed up study prior to sending back home     VAD interrogation today: VAD interrogation reviewed with VAD coordinator. Agree with findings. Rare PI events, no power spikes, speed drops, or other findings suspicious of pump malfunction noted.     HTN.   - Continue Lisinopril   - Increase hydralazine to three times a day (was only taking twice)  - Consider increasing lisinopril if renal function is better on Monday     New Onset Afib s/p DCCV/history of Aflutter. ECG consistent with Afib w/ RVR and aberrancy vs. [facility] vs. AT vs. Slow VT. S/p DCCV on 2/28 back into sinus rhythm.  - Coumadin as above.   - Continue Digoxin.     Retrosternal Hematoma. CT chest 2/26: measuring 5.1 cm. S/P chest tube per CVTS 3/5, d/c'ed 3/15. Chest X-ray 3/18 with stable fluid to right interlobular fissure, Hgb stable.   - Follow with CVTS    Decreased right sided breathsounds  - Check CXR, 2 view today    Follow up   - Labs Monday  - Dr. Cisneros next week as scheduled   - NP in 2 weeks  - Needs an Speed optimization study before leaving the [facility]        BIJAL Padron TAMAS

## 2020-04-02 NOTE — PROGRESS NOTES
HPI:   Eliseo Tanner is a 64 year old male with chronic systolic heart failure secondary to NICM now s/p HM 3 on 2/18, moderate CAD, HTN, ABHINAV on CPAP, DM2, CKD Stage III, ANA. His HM3 post-op course was complicated by retrosternal hematoma and bleeding in the lungs, RV failure,VT in ICU now on amiodarone and Afib w/AVR S/p DCCV on 2/28. He had pre-op proteus and enterococcus bacteremia from 2/13, s/p abx. He had an ICD placed on 3/16/2020. This is his first clinic visit. This was an in person visit.    Bon is been feeling quite well.  He has no shortness of breath at rest.  Can walk 3-4 blocks before he would need a break.  This is improving.  No swelling in his legs.  No swelling in his belly.  No orthopnea or PND.  He had one episode of dizziness last week which by history sounds very vasovagal, occurred after going to the bathroom, dizziness followed by sweats, took 20 to 40 minutes to feel better.  No palpitations.  No chest pain.  His appetite is good.  No blood in the urine or blood in the stool.  No prolonged nosebleeds.  No hemoptysis.  No driveline erythema, drainage, pain.  No stroke symptoms.    His weights have been stable around 192 since coming home.     He lives in [facility]. He has previously had a cardiologsit in [facility]. That is only 80 miles from his house.    Cardiac Medications  ASA 81 mg daily  Coumadin  Amiodarone 200 mg daily- for post-op VT s/p external shock  Atorvastatin 20 mg daily  Digoxin 125 mcg daily  Lasix 40 mg daily  Kcl 20 meq daily  Hydralazine 100 mg TID- has only been taking twice  Lisinopril 10 mg daily    PAST MEDICAL HISTORY:  No past medical history on file.    FAMILY HISTORY:  No family history on file.    SOCIAL HISTORY:  Social History     Socioeconomic History     Marital status:      Spouse name: Not on file     Number of children: Not on file     Years of education: Not on file     Highest education level: Not on file   Occupational History     Not  on file   Social Needs     Financial resource strain: Not on file     Food insecurity     Worry: Not on file     Inability: Not on file     Transportation needs     Medical: Not on file     Non-medical: Not on file   Tobacco Use     Smoking status: Not on file   Substance and Sexual Activity     Alcohol use: Not on file     Drug use: Not on file     Sexual activity: Not on file   Lifestyle     Physical activity     Days per week: Not on file     Minutes per session: Not on file     Stress: Not on file   Relationships     Social connections     Talks on phone: Not on file     Gets together: Not on file     Attends Oriental orthodox service: Not on file     Active member of club or organization: Not on file     Attends meetings of clubs or organizations: Not on file     Relationship status: Not on file     Intimate partner violence     Fear of current or ex partner: Not on file     Emotionally abused: Not on file     Physically abused: Not on file     Forced sexual activity: Not on file   Other Topics Concern     Not on file   Social History Narrative     Not on file       CURRENT MEDICATIONS:  acetaminophen (TYLENOL) 325 MG tablet, Take 2 tablets (650 mg) by mouth every 4 hours as needed for mild pain or fever  albuterol (PROAIR HFA/PROVENTIL HFA/VENTOLIN HFA) 108 (90 Base) MCG/ACT inhaler, Inhale 2 puffs into the lungs every 6 hours as needed for wheezing  allopurinol (ZYLOPRIM) 100 MG tablet, 2 tablets (200 mg) by Oral or Feeding Tube route daily  amiodarone (PACERONE) 200 MG tablet, Take 1 tablet (200 mg) by mouth daily  aspirin (ASA) 81 MG EC tablet, Take 1 tablet (81 mg) by mouth daily  atorvastatin (LIPITOR) 20 MG tablet, 1 tablet (20 mg) by Oral or Feeding Tube route every evening  co-enzyme Q-10 200 MG CAPS, 200 mg by Oral or Feeding Tube route daily  diclofenac (VOLTAREN) 1 % topical gel, Apply 4 g topically 4 times daily as needed for moderate pain  digoxin (LANOXIN) 125 MCG tablet, Take 1 tablet (125 mcg) by mouth  daily  FLUoxetine (PROZAC) 20 MG capsule, Take 1 capsule (20 mg) by mouth daily  insulin glargine (LANTUS PEN) 100 UNIT/ML pen, Inject 10 Units Subcutaneous At Bedtime  insulin pen needle (BD JAIME U/F) 32G X 4 MM miscellaneous,   lisinopril (ZESTRIL) 10 MG tablet, Take 1 tablet (10 mg) by mouth daily  magnesium oxide (MAG-OX) 400 MG tablet, 1 tablet (400 mg) by Oral or Feeding Tube route daily  methocarbamol (ROBAXIN) 500 MG tablet, Take 1 tablet (500 mg) by mouth 4 times daily as needed for muscle spasms  MULTIPLE MINERALS-VITAMINS PO, Take 1 tablet by mouth daily  multivitamin w/minerals (THERA-VIT-M) tablet, Take 1 tablet by mouth daily  ondansetron (ZOFRAN) 4 MG tablet, Take 4 mg by mouth every 4 hours as needed for nausea  pantoprazole (PROTONIX) 40 MG EC tablet, Take 1 tablet (40 mg) by mouth every morning (before breakfast)  potassium chloride ER (KLOR-CON M) 20 MEQ CR tablet, Take 1 tablet (20 mEq) by mouth daily  senna-docusate (SENOKOT-S/PERICOLACE) 8.6-50 MG tablet, 1 tablet by Oral or Feeding Tube route 2 times daily as needed for constipation  [] cephALEXin (KEFLEX) 500 MG capsule, Take 1 capsule (500 mg) by mouth 3 times daily for 5 days  [] cephalexin 500 MG tablet, Take 500 mg by mouth 3 times daily for 5 days    No current facility-administered medications on file prior to visit.       ROS:   CONSTITUTIONAL: Denies fever, chills, fatigue.  HEENT: Denies headache, vision changes, and changes in speech.   CV: Refer to HPI.   PULMONARY:Refer to HPI.   GI:Denies nausea, vomiting, diarrhea, and abdominal pain. Bowel movements are regular.   :Denies urinary alterations, dysuria, urinary frequency, hematuria, and abnormal drainage.   EXT:Denies lower extremity edema.   SKIN:Denies abnormal rashes or lesions.   MUSCULOSKELETAL:Denies upper or lower extremity weakness and pain.   NEUROLOGIC:Denies seizures, or upper or lower extremity paresthesia.     EXAM:  BP (!) 94/0 (BP Location: Right  "arm, Patient Position: Chair, Cuff Size: Adult Regular)   Pulse 98   Temp 98.3  F (36.8  C) (Oral)   Ht 1.702 m (5' 7\")   Wt 88.7 kg (195 lb 9.6 oz)   SpO2 97%   BMI 30.64 kg/m      GENERAL: Appears comfortable, in no distress, speaking in full sentances and able to communicate all needs.  HEENT: Eye symmetrical and without discharge or icterus bilaterally. Mucous membranes moist and without lesions.  NECK: Supple, JVD >10.   CV: Hum of HM3  RESPIRATORY: Respirations regular, even, and unlabored. Decreased lung sounds at right lower lung base  GI: Soft and non distended with normoactive bowel sounds present in all quadrants. No tenderness, rebound, guarding. No organomegaly.   EXTREMITIES: No peripheral edema. None pulsatile.   NEUROLOGIC: Alert and interacting appropiratly No focal deficits.   MUSCULOSKELETAL: No joint swelling or tenderness.   SKIN: No jaundice. No rashes or lesions. Driveline dressing c/d/i. Driveline site per coordinator.    Labs - as reviewed in clinic with patient today:  CBC RESULTS:  Lab Results   Component Value Date    WBC 9.1 04/02/2020    RBC 3.89 (L) 04/02/2020    HGB 9.5 (L) 04/02/2020    HCT 33.4 (L) 04/02/2020    MCV 86 04/02/2020    MCH 24.4 (L) 04/02/2020    MCHC 28.4 (L) 04/02/2020    RDW 18.1 (H) 04/02/2020     (H) 04/02/2020       CMP RESULTS:  Lab Results   Component Value Date     04/02/2020    POTASSIUM 4.2 04/02/2020    CHLORIDE 103 04/02/2020    CO2 24 04/02/2020    ANIONGAP 9 04/02/2020     (H) 04/02/2020    BUN 35 (H) 04/02/2020    CR 1.38 (H) 04/02/2020    GFRESTIMATED 53 (L) 04/02/2020    GFRESTBLACK 61 04/02/2020    CALOS 8.9 04/02/2020    BILITOTAL 0.5 04/02/2020    ALBUMIN 3.1 (L) 04/02/2020    ALKPHOS 164 (H) 04/02/2020    ALT 37 04/02/2020    AST 31 04/02/2020        INR RESULTS:  Lab Results   Component Value Date    INR 3.74 (H) 04/02/2020       Lab Results   Component Value Date    MAG 2.2 03/20/2020     Lab Results   Component Value Date " "   NTBN 12,052 (H) 02/08/2020     No results found for: NTBN    Diagnostics  ICD Check  Patient initiated remote ICD transmission received and reviewed.  Device transmission sent per MD orders.  Patient has a Medtronic single lead ICD.  Normal ICD function.  43 AF episodes recorded since 3/21/20, longest of 56 minutes, AF burden = 3.1%.  No ventricular arrhythmias recorded.  ICD settings:  Monitor zone @ 176-222 bpm, VF zone @ 222 bpm.   Presenting EGM = Irregular VS @  bpm.    =<0.1%.  OptiVol fluid index is establishing baseline. Estimated battery longevity to EPRLA = 11.5 years.   Battery voltage = 3.15V.  No short v-v intervals recorded. Lead trends appear stable.  Patient had an episode of \"dizziness and clamminess over the weekend\" per LVAD coordinatorValeria.  Patient is scheduled for appointment with STEW Padron today.  Plan for patient to return to clinic in 3 months as scheduled.  DEIDRA Marcos RN.      Remote ICD transmission    CXR 3/9/2020:  1.  Stable supportive lines and tubes.  2.  Cardiomegaly with LVAD placement, stable.  3.  Right apical hemothorax evacuation. Right mid/lower lobe  subsegmental atelectasis, overall stable.     CT Chest 3/4/2020:  1. Grossly stable large right apical hemothorax compared to recent  radiographs. Tiny nondisplaced fractures of the medial right first and  second ribs. Tiny right pneumothorax.   2. Intrafissural position of the right basilar chest tube abutting the  pericardium and with the tip immediately inferior to the right hilar  vasculature.   3. Small left pleural effusion, which also contains hyperdense fluid  suspicious for small component of left-sided hemothorax.  4. Stable retrosternal hematoma.  5. Cardiomegaly with small pericardial effusion versus  hemopericardium. Stable LVAD and pericardial drain.     Echo 2/25/2020:  Left ventricular size is normal. Severely reduced left ventricular systolic  function. Septum is midline  LVAD inflow or " outflow cannulae not visualized. Doppler velocities are not  elevated.  Moderate to severely reduced right ventricular systolic function.  Aortic valve appears closed during the entire cardiac cycle. Trace aortic  insufficiency is present.  IVC is plethoric.  No pericardial effusion present.    Assessment and Plan:   Eliseo Tanner is a 64 year old male with chronic systolic heart failure secondary to NICM now s/p HM 3 on 2/18, moderate CAD, HTN, ABHINAV on CPAP, DM2, CKD Stage III, ANA. His HM3 post-op course was complicated by retrosternal hematoma, RV failure,VT in ICU now on amiodarone and Afib w/AVR S/p DCCV on 2/28. He had pre-op proteus and enterococcus bacteremia from 2/13, s/p abx. He had an ICD placed on 3/16/2020. This is his first clinic visit.    He looks very good in clinic today overall, getting stronger and moving very well. However, he is hypertensive. His neck vein is up, although his weight has been quite stable. We will do a three day burst of extra lasix and recheck labs on Monday to make sure we have not made the Cr worse.      We will also increase hydralazine to three times a day, has only been taking twice a day.    Chronic SCHF secondary to NICM s/p HM III with RV dysfunction. Implanted 2/18 and complicated by bleeding. Echo at 5600 rpm notes mild-moderate RV dysfunction with AV closed and trace AI, current speed 5500 rpm.   Stage D, NYHA Class IIIB  ACEi/ARB Lisinopril 10 mg po daily. Increase Hydralazine 100 mg po TID.   BB Defer s/p recent LVAD w/right heart failure on digoxin  Aldosterone antagonist deferred while other medical therapy is prioritized  SCD prophylaxis ICD implanted 3/16.`  Fluid status Hypervolemic: Lasix 40/20 x3 days. Then back down to 40 mg daily. Repeat labs Monday. Will take 20 meq extra kcl today otherwise continue kcl 20 meq daily throughout  MAP: 94- increasing hydralazine  LDH: 322 stable  Anticoagulation: Coumadin per pharmacy. INR-3.74, Goal  2-3.  Antiplatelet: ASA 81 mg po daily  - Continue Digoxin for RV support.  - Will plan for Speed up study prior to sending back home     VAD interrogation today: VAD interrogation reviewed with VAD coordinator. Agree with findings. Rare PI events, no power spikes, speed drops, or other findings suspicious of pump malfunction noted.     HTN.   - Continue Lisinopril   - Increase hydralazine to three times a day (was only taking twice)  - Consider increasing lisinopril if renal function is better on Monday     New Onset Afib s/p DCCV/history of Aflutter. ECG consistent with Afib w/ RVR and aberrancy vs. NATHALIA vs. AT vs. Slow VT. S/p DCCV on 2/28 back into sinus rhythm.  - Coumadin as above.   - Continue Digoxin.     Retrosternal Hematoma. CT chest 2/26: measuring 5.1 cm. S/P chest tube per CVTS 3/5, d/c'ed 3/15. Chest X-ray 3/18 with stable fluid to right interlobular fissure, Hgb stable.   - Follow with CVTS    Decreased right sided breathsounds  - Check CXR, 2 view today    Follow up   - Labs Monday  - Dr. Cisneros next week as scheduled   - NP in 2 weeks  - Needs an Speed optimization study before leaving the [facility]        BIJAL Padron TAMAS

## 2020-04-03 ENCOUNTER — CARE COORDINATION (OUTPATIENT)
Dept: CARDIOLOGY | Facility: CLINIC | Age: 65
End: 2020-04-03

## 2020-04-03 DIAGNOSIS — Z95.811 LVAD (LEFT VENTRICULAR ASSIST DEVICE) PRESENT (H): ICD-10-CM

## 2020-04-03 RX ORDER — WARFARIN SODIUM 4 MG/1
TABLET ORAL
Qty: 150 TABLET | Refills: 3 | Status: ON HOLD | OUTPATIENT
Start: 2020-04-03 | End: 2020-11-15

## 2020-04-03 NOTE — PROGRESS NOTES
Called patient/caregiver to check in 2 weeks post discharge. Pt reports VAD parameters WNL and weight 193, which is stabl. Reviewed medications and answered any questions. Patient reports sleeping well and no anxiety since being home with LVAD. Patient is able to move around the house and care for himself independently.   Discussed specific new problems/stressors since being discharged from the hospital: none currently. Empathized with patient and reviewed coping strategies: enlisting support from friends and love ones, attending patient and caregiver support groups, reviewing LVAD educational materials to reinforce knowledge, and talking about concerns with family/care providers/trusted others. Encouraged pt to page VAD Coordinator with any issues or questions. Pt verbalizes understanding.

## 2020-04-06 ENCOUNTER — ANTICOAGULATION THERAPY VISIT (OUTPATIENT)
Dept: ANTICOAGULATION | Facility: CLINIC | Age: 65
End: 2020-04-06

## 2020-04-06 ENCOUNTER — CARE COORDINATION (OUTPATIENT)
Dept: CARDIOLOGY | Facility: CLINIC | Age: 65
End: 2020-04-06

## 2020-04-06 DIAGNOSIS — Z95.811 LVAD (LEFT VENTRICULAR ASSIST DEVICE) PRESENT (H): ICD-10-CM

## 2020-04-06 DIAGNOSIS — I50.22 CHRONIC SYSTOLIC CONGESTIVE HEART FAILURE (H): ICD-10-CM

## 2020-04-06 DIAGNOSIS — I50.22 CHRONIC SYSTOLIC CONGESTIVE HEART FAILURE (H): Primary | ICD-10-CM

## 2020-04-06 LAB
ANION GAP SERPL CALCULATED.3IONS-SCNC: 6 MMOL/L (ref 3–14)
BUN SERPL-MCNC: 34 MG/DL (ref 7–30)
CALCIUM SERPL-MCNC: 9.1 MG/DL (ref 8.5–10.1)
CHLORIDE SERPL-SCNC: 102 MMOL/L (ref 94–109)
CO2 SERPL-SCNC: 28 MMOL/L (ref 20–32)
CREAT SERPL-MCNC: 1.26 MG/DL (ref 0.66–1.25)
GFR SERPL CREATININE-BSD FRML MDRD: 59 ML/MIN/{1.73_M2}
GLUCOSE SERPL-MCNC: 211 MG/DL (ref 70–99)
INR PPP: 4.11 (ref 0.86–1.14)
POTASSIUM SERPL-SCNC: 4.2 MMOL/L (ref 3.4–5.3)
SODIUM SERPL-SCNC: 135 MMOL/L (ref 133–144)

## 2020-04-06 NOTE — PROGRESS NOTES
Labs rcvd and reviewed    Date: 4/6/2020    Time of Call: 2:46 PM     [ TORB ] Ordering provider: Karolina MATHIAS  Order: Increase Torsemide to 40 mg in AM and 20 mg in PM with 20 mEq of Potassium daily until appointment with Dr. Cisneros on Wednesday.     Order received by: writer     Follow-up/additional notes: left voicemail with change in instructions.  Pt encouraged to return call with questions.

## 2020-04-06 NOTE — PROGRESS NOTES
ANTICOAGULATION FOLLOW-UP CLINIC VISIT    Patient Name:  Eliseo Tanner  Date:  2020  Contact Type:  Telephone    SUBJECTIVE:  Patient Findings     Comments:   Spoke with spouse, Veronique.  She reports Eliseo has not had any changes in health, diet, medications.  She doesn't think he is retaining much fluid anymore.  He does not have any signs of bleeding.  Reviewed signs of bleeding with Veronique.  Eliseo will be seen urgently if he develops any signs of bleeding or if he falls.  He will also eat an extra serving of green vegetables today.          Clinical Outcomes     Comments:   Spoke with spouse, Veronique.  She reports Eliseo has not had any changes in health, diet, medications.  She doesn't think he is retaining much fluid anymore.  He does not have any signs of bleeding.  Reviewed signs of bleeding with Veronique.  Eliseo will be seen urgently if he develops any signs of bleeding or if he falls.  He will also eat an extra serving of green vegetables today.             OBJECTIVE    INR   Date Value Ref Range Status   2020 4.11 (H) 0.86 - 1.14 Final     Chromogenic Factor 10   Date Value Ref Range Status   2020 27 (L) 70 - 130 % Final     Comment:     Therapeutic Range:  A Chromogenic Factor 10 level of approximately 20-40%   inversely correlates with an INR of 2-3 for patients receiving Warfarin.   Chromogenic Factor 10 levels below 20% indicate an INR greater than 3 and   levels above 40% indicate an INR less than 2.         ASSESSMENT / PLAN  INR assessment SUPRA    Recheck INR In: 3 DAYS    INR Location Clinic      Anticoagulation Summary  As of 2020    INR goal:   2.0-3.0   TTR:   27.5 % (1.6 wk)   INR used for dosin.11! (2020)   Warfarin maintenance plan:   No maintenance plan   Full warfarin instructions:   : 2 mg; : 4 mg; : 4 mg; Otherwise No maintenance plan   Next INR check:   2020   Priority:   Critical   Target end date:   Indefinite    Indications    LVAD (left  ventricular assist device) present (H) [Z95.811]             Anticoagulation Episode Summary     INR check location:       Preferred lab:       Send INR reminders to:   [facility]    Comments:   Patient is staying at [facility].  Patient on Amiodarone  HM 3  placed 2/18/2020, Speak to Veronique ramírez at [phone]      Anticoagulation Care Providers     Provider Role Specialty Phone number    Gucci Tomas MD Referring Cardiology [phone]            See the Encounter Report to view Anticoagulation Flowsheet and Dosing Calendar (Go to Encounters tab in chart review, and find the Anticoagulation Therapy Visit)    Spoke with Veronique ramírez.      Patient had LVAD placed on:   2/18/2020  Type of LVAD: HM 3  Patient's current Aspirin dose: 81 mg daily  LVAD Protocol followed: Yes     Krysta Mcdaniel RN

## 2020-04-08 ENCOUNTER — VIRTUAL VISIT (OUTPATIENT)
Dept: CARDIOLOGY | Facility: CLINIC | Age: 65
End: 2020-04-08
Attending: INTERNAL MEDICINE
Payer: MEDICARE

## 2020-04-08 ENCOUNTER — ANTICOAGULATION THERAPY VISIT (OUTPATIENT)
Dept: ANTICOAGULATION | Facility: CLINIC | Age: 65
End: 2020-04-08

## 2020-04-08 ENCOUNTER — CARE COORDINATION (OUTPATIENT)
Dept: CARDIOLOGY | Facility: CLINIC | Age: 65
End: 2020-04-08

## 2020-04-08 DIAGNOSIS — Z95.811 LVAD (LEFT VENTRICULAR ASSIST DEVICE) PRESENT (H): ICD-10-CM

## 2020-04-08 DIAGNOSIS — N18.30 CKD (CHRONIC KIDNEY DISEASE) STAGE 3, GFR 30-59 ML/MIN (H): ICD-10-CM

## 2020-04-08 DIAGNOSIS — I50.22 CHRONIC SYSTOLIC CONGESTIVE HEART FAILURE (H): ICD-10-CM

## 2020-04-08 DIAGNOSIS — I10 BENIGN ESSENTIAL HYPERTENSION: ICD-10-CM

## 2020-04-08 DIAGNOSIS — Z95.811 LVAD (LEFT VENTRICULAR ASSIST DEVICE) PRESENT (H): Primary | ICD-10-CM

## 2020-04-08 DIAGNOSIS — I25.5 ISCHEMIC CARDIOMYOPATHY: ICD-10-CM

## 2020-04-08 DIAGNOSIS — I50.22 CHRONIC SYSTOLIC CONGESTIVE HEART FAILURE (H): Primary | ICD-10-CM

## 2020-04-08 LAB
ALBUMIN SERPL-MCNC: 3.3 G/DL (ref 3.4–5)
ALP SERPL-CCNC: 146 U/L (ref 40–150)
ALT SERPL W P-5'-P-CCNC: 43 U/L (ref 0–70)
ANION GAP SERPL CALCULATED.3IONS-SCNC: 9 MMOL/L (ref 3–14)
AST SERPL W P-5'-P-CCNC: 35 U/L (ref 0–45)
BILIRUB SERPL-MCNC: 0.4 MG/DL (ref 0.2–1.3)
BUN SERPL-MCNC: 26 MG/DL (ref 7–30)
CALCIUM SERPL-MCNC: 9 MG/DL (ref 8.5–10.1)
CHLORIDE SERPL-SCNC: 101 MMOL/L (ref 94–109)
CO2 SERPL-SCNC: 25 MMOL/L (ref 20–32)
CREAT SERPL-MCNC: 1.28 MG/DL (ref 0.66–1.25)
D DIMER PPP FEU-MCNC: 7.2 UG/ML FEU (ref 0–0.5)
ERYTHROCYTE [DISTWIDTH] IN BLOOD BY AUTOMATED COUNT: 18.3 % (ref 10–15)
GFR SERPL CREATININE-BSD FRML MDRD: 58 ML/MIN/{1.73_M2}
GLUCOSE SERPL-MCNC: 148 MG/DL (ref 70–99)
HCT VFR BLD AUTO: 35.3 % (ref 40–53)
HGB BLD-MCNC: 10.3 G/DL (ref 13.3–17.7)
INR PPP: 3.61 (ref 0.86–1.14)
LDH SERPL L TO P-CCNC: 312 U/L (ref 85–227)
MAGNESIUM SERPL-MCNC: 1.9 MG/DL (ref 1.6–2.3)
MCH RBC QN AUTO: 24.4 PG (ref 26.5–33)
MCHC RBC AUTO-ENTMCNC: 29.2 G/DL (ref 31.5–36.5)
MCV RBC AUTO: 84 FL (ref 78–100)
PLATELET # BLD AUTO: 525 10E9/L (ref 150–450)
POTASSIUM SERPL-SCNC: 4.1 MMOL/L (ref 3.4–5.3)
PROT SERPL-MCNC: 8.7 G/DL (ref 6.8–8.8)
RBC # BLD AUTO: 4.22 10E12/L (ref 4.4–5.9)
SODIUM SERPL-SCNC: 135 MMOL/L (ref 133–144)
WBC # BLD AUTO: 8.6 10E9/L (ref 4–11)

## 2020-04-08 PROCEDURE — 85610 PROTHROMBIN TIME: CPT | Performed by: INTERNAL MEDICINE

## 2020-04-08 PROCEDURE — 99443 ZZC PHYSICIAN TELEPHONE EVALUATION 21-30 MIN: CPT | Performed by: INTERNAL MEDICINE

## 2020-04-08 PROCEDURE — 36415 COLL VENOUS BLD VENIPUNCTURE: CPT | Performed by: INTERNAL MEDICINE

## 2020-04-08 PROCEDURE — 85379 FIBRIN DEGRADATION QUANT: CPT | Performed by: INTERNAL MEDICINE

## 2020-04-08 PROCEDURE — 80053 COMPREHEN METABOLIC PANEL: CPT | Performed by: INTERNAL MEDICINE

## 2020-04-08 PROCEDURE — 83735 ASSAY OF MAGNESIUM: CPT | Performed by: INTERNAL MEDICINE

## 2020-04-08 PROCEDURE — 85027 COMPLETE CBC AUTOMATED: CPT | Performed by: INTERNAL MEDICINE

## 2020-04-08 PROCEDURE — 83615 LACTATE (LD) (LDH) ENZYME: CPT | Performed by: INTERNAL MEDICINE

## 2020-04-08 RX ORDER — DIGOXIN 125 MCG
125 TABLET ORAL DAILY
Qty: 90 TABLET | Refills: 3 | Status: SHIPPED | OUTPATIENT
Start: 2020-04-08 | End: 2020-09-22

## 2020-04-08 RX ORDER — ATORVASTATIN CALCIUM 20 MG/1
20 TABLET, FILM COATED ORAL EVERY EVENING
Qty: 90 TABLET | Refills: 3 | Status: SHIPPED | OUTPATIENT
Start: 2020-04-08 | End: 2020-09-22 | Stop reason: SINTOL

## 2020-04-08 RX ORDER — POTASSIUM CHLORIDE 1500 MG/1
20 TABLET, EXTENDED RELEASE ORAL DAILY
Qty: 90 TABLET | Refills: 3 | Status: SHIPPED | OUTPATIENT
Start: 2020-04-08 | End: 2020-04-14

## 2020-04-08 RX ORDER — AMIODARONE HYDROCHLORIDE 200 MG/1
200 TABLET ORAL DAILY
Qty: 90 TABLET | Refills: 3 | Status: SHIPPED | OUTPATIENT
Start: 2020-04-08 | End: 2020-04-14

## 2020-04-08 RX ORDER — LISINOPRIL 10 MG/1
10 TABLET ORAL DAILY
Qty: 90 TABLET | Refills: 3 | Status: SHIPPED | OUTPATIENT
Start: 2020-04-08 | End: 2020-04-14

## 2020-04-08 NOTE — PATIENT INSTRUCTIONS
-Please continue medications at this time as you are   - Please follow-up-in clinic next week as per schedule.  We will assist to signing or documentations as needed

## 2020-04-08 NOTE — PROGRESS NOTES
"Eliseo Tanner is a 64 year old male who is being evaluated via a billable telephone visit.      The patient has been notified of following:     \"This telephone visit will be conducted via a call between you and your physician/provider. We have found that certain health care needs can be provided without the need for a physical exam.  This service lets us provide the care you need with a short phone conversation.  If a prescription is necessary we can send it directly to your pharmacy.  If lab work is needed we can place an order for that and you can then stop by our lab to have the test done at a later time.    Telephone visits are billed at different rates depending on your insurance coverage. During this emergency period, for some insurers they may be billed the same as an in-person visit.  Please reach out to your insurance provider with any questions.    If during the course of the call the physician/provider feels a telephone visit is not appropriate, you will not be charged for this service.\"    Patient has given verbal consent for Telephone visit?  Yes    Phone call duration: 22 minutes    April 8, 2020  Eliseo Tanner is a 64 year old male with chronic systolic heart failure secondary to NICM now s/p HM 3 on 2/18, moderate CAD, HTN, ABHINAV on CPAP, DM2, CKD Stage III, ANA. His HM3 post-op course was complicated by retrosternal hematoma and bleeding in the lungs, RV failure,VT in ICU now on amiodarone and Afib w/AVR S/p DCCV on 2/28. He had pre-op proteus and enterococcus bacteremia from 2/13, s/p abx. He had an ICD placed on 3/16/2020 just before his discharge from the hospital. He was seen in [facility] recently and this is hiw first opt visit with me.   He has been feeling very well since his LVAD implantation.  He denies any lower extremity edema and shortness of breath recently.  He did have as above he is Lasix increased to 40/20 for 3 days but now he is back to 40 once a day and he is doing better " valve abuse.  He denies PND orthopnea he is able to walk around with his wife quite a bit without any symptoms.  He denies any bleeding no strokelike symptoms no medication intolerance.  He also denies any palpitations dizziness or lightheadedness.  The driveline looks good.  They checked the LVAD frequently the flows always in the 4.1 to 4.3 range PIs in the 3.5 to 3.9 range and that there were no alarms.  Review of system otherwise negative.  Weight has been stable.     SOCIAL HISTORY:  Social History     Socioeconomic History     Marital status:      Spouse name: Not on file     Number of children: Not on file     Years of education: Not on file     Highest education level: Not on file   Occupational History     Not on file   Social Needs     Financial resource strain: Not on file     Food insecurity     Worry: Not on file     Inability: Not on file     Transportation needs     Medical: Not on file     Non-medical: Not on file   Tobacco Use     Smoking status: Former Smoker     Last attempt to quit: 1994     Years since quittin.0     Smokeless tobacco: Never Used   Substance and Sexual Activity     Alcohol use: Not on file     Drug use: Not on file     Sexual activity: Not on file   Lifestyle     Physical activity     Days per week: Not on file     Minutes per session: Not on file     Stress: Not on file   Relationships     Social connections     Talks on phone: Not on file     Gets together: Not on file     Attends [employer] service: Not on file     Active member of club or organization: Not on file     Attends meetings of clubs or organizations: Not on file     Relationship status: Not on file     Intimate partner violence     Fear of current or ex partner: Not on file     Emotionally abused: Not on file     Physically abused: Not on file     Forced sexual activity: Not on file   Other Topics Concern     Not on file   Social History Narrative     Not on file     CURRENT MEDICATIONS:  Current  Outpatient Medications   Medication     acetaminophen (TYLENOL) 325 MG tablet     allopurinol (ZYLOPRIM) 100 MG tablet     amiodarone (PACERONE) 200 MG tablet     aspirin (ASA) 81 MG EC tablet     atorvastatin (LIPITOR) 20 MG tablet     co-enzyme Q-10 200 MG CAPS     digoxin (LANOXIN) 125 MCG tablet     FLUoxetine (PROZAC) 20 MG capsule     furosemide (LASIX) 20 MG tablet     hydrALAZINE (APRESOLINE) 100 MG tablet     insulin glargine (LANTUS PEN) 100 UNIT/ML pen     insulin pen needle (BD JAIME U/F) 32G X 4 MM miscellaneous     lisinopril (ZESTRIL) 10 MG tablet     magnesium oxide (MAG-OX) 400 MG tablet     methocarbamol (ROBAXIN) 500 MG tablet     MULTIPLE MINERALS-VITAMINS PO     multivitamin w/minerals (THERA-VIT-M) tablet     pantoprazole (PROTONIX) 40 MG EC tablet     potassium chloride ER (KLOR-CON M) 20 MEQ CR tablet     warfarin ANTICOAGULANT (COUMADIN) 4 MG tablet     albuterol (PROAIR HFA/PROVENTIL HFA/VENTOLIN HFA) 108 (90 Base) MCG/ACT inhaler     diclofenac (VOLTAREN) 1 % topical gel     ondansetron (ZOFRAN) 4 MG tablet     senna-docusate (SENOKOT-S/PERICOLACE) 8.6-50 MG tablet     No current facility-administered medications for this visit.      ROS:   Constitutional: No fever, chills, or sweats. Weight is 195lbs  ENT: No visual disturbance, ear ache, epistaxis, sore throat.   Allergies/Immunologic: Negative.   Respiratory: No cough, hemoptysis.   Cardiovascular: As per HPI.   GI: No nausea, vomiting, hematemesis, melena, or hematochezia.   : No urinary frequency, dysuria, or hematuria.   Integument: Negative.   Psychiatric: Pleasant, no major depression noted  Neuro: No focal neurological deficits noted  Endocrinology: Negative.   Musculoskeletal: As per HPI.      EXAM:  LVAD parameters reviewed over the phone.  Again flows are 4.1 to 4.3 L/min and PIs around 3.5-3.9.  No LVAD alarms.  There is no reported lower extremity edema no abdominal bloating.     Labs:  Lab Results   Component Value Date     "WBC 8.6 04/08/2020    HGB 10.3 (L) 04/08/2020    HCT 35.3 (L) 04/08/2020     (H) 04/08/2020     04/08/2020    POTASSIUM 4.1 04/08/2020    CHLORIDE 101 04/08/2020    CO2 25 04/08/2020    BUN 26 04/08/2020    CR 1.28 (H) 04/08/2020     (H) 04/08/2020    DD 10.9 (H) 04/02/2020    NTBNPI 12,559 (H) 02/08/2020    TROPI 0.089 (H) 02/13/2020    AST 35 04/08/2020    ALT 43 04/08/2020    ALKPHOS 146 04/08/2020    BILITOTAL 0.4 04/08/2020    INR 4.11 (H) 04/06/2020     ICD Check  Patient initiated remote ICD transmission received and reviewed.  Device transmission sent per MD orders.  Patient has a Medtronic single lead ICD.  Normal ICD function.  43 AF episodes recorded since 3/21/20, longest of 56 minutes, AF burden = 3.1%.  No ventricular arrhythmias recorded.  ICD settings:  Monitor zone @ 176-222 bpm, VF zone @ 222 bpm.   Presenting EGM = Irregular VS @  bpm.    =<0.1%.  OptiVol fluid index is establishing baseline. Estimated battery longevity to PERLA = 11.5 years.   Battery voltage = 3.15V.  No short v-v intervals recorded. Lead trends appear stable.  Patient had an episode of \"dizziness and clamminess over the weekend\" per LVAD coordinatorValeria.  Patient is scheduled for appointment with STEW Padron today.  Plan for patient to return to clinic in 3 months as scheduled.  DEIDRA Marcos RN.      CXR 3/9/2020:  1.  Stable supportive lines and tubes.  2.  Cardiomegaly with LVAD placement, stable.  3.  Right apical hemothorax evacuation. Right mid/lower lobe  subsegmental atelectasis, overall stable.     CT Chest 3/4/2020:  1. Grossly stable large right apical hemothorax compared to recent  radiographs. Tiny nondisplaced fractures of the medial right first and  second ribs. Tiny right pneumothorax.   2. Intrafissural position of the right basilar chest tube abutting the  pericardium and with the tip immediately inferior to the right hilar  vasculature.   3. Small left pleural effusion, " which also contains hyperdense fluid  suspicious for small component of left-sided hemothorax.  4. Stable retrosternal hematoma.  5. Cardiomegaly with small pericardial effusion versus  hemopericardium. Stable LVAD and pericardial drain.     Echo 2/25/2020:  Left ventricular size is normal. Severely reduced left ventricular systolic function. Septum is midline  LVAD inflow or outflow cannulae not visualized. Doppler velocities are not elevated.  Moderate to severely reduced right ventricular systolic function.  Aortic valve appears closed during the entire cardiac cycle. Trace aortic  insufficiency is present. IVC is plethoric.  No pericardial effusion present.     Assessment and Plan:   Eliseo Tanner is a 64 year old male with chronic systolic heart failure secondary to NICM now s/p HM 3 on 2/18, moderate CAD, HTN, ABHINAV on CPAP, DM2, CKD Stage III, ANA. His HM3 post-op course was complicated by retrosternal hematoma, RV failure,VT in ICU now on amiodarone and Afib w/AVR S/p DCCV on 2/28. He had pre-op proteus and enterococcus bacteremia from 2/13, s/p abx. He had an ICD placed on 3/16/2020 and is followed in [facility].  He has been doing very well since his LVAD implantation and continues to do well.  There is no concerning signs or symptoms of stroke or bleeding.  We will not change his medications at this time and will need to check his blood pressure  [facility] clinic if further medication adjustments are needed.  Blood work reviewed from today and creatinine has been stable.  They are asking about some paperwork that we will complete during his next clinic visit the upcoming week.     Chronic SCHF secondary to NICM s/p HM III with RV dysfunction. Implanted 2/18 and complicated by bleeding. Echo at 5600 rpm notes mild-moderate RV dysfunction with AV closed and trace AI, current speed 5500 rpm.   Stage D, NYHA Class IIIB  ACEi/ARB Lisinopril 10 mg po daily. Increase Hydralazine 100 mg po TID.   BB Defer  s/p recent LVAD w/right heart failure on digoxin  Aldosterone antagonist deferred while other medical therapy is prioritized  SCD prophylaxis ICD implanted 3/16.  Fluid status: continue lasix 40mg daily  LDH: stable at 312  Anticoagulation: Coumadin per pharmacy. INR-3.74, Goal 2-3.  Antiplatelet: ASA 81 mg po daily  - Continue Digoxin for RV support.  - Will plan for Speed optimization study prior to sending patient back home      VAD interrogation today: VAD interrogation reviewed with VAD coordinator. Agree with findings. Rare PI events, no power spikes, speed drops, or other findings suspicious of pump malfunction noted.      HTN.   - Continue Lisinopril   - Increase hydralazine to three times a day (was only taking twice)  - Consider increasing lisinopril if renal function is better on Monday     New Onset Afib s/p DCCV/history of Aflutter. ECG consistent with Afib w/ RVR and aberrancy vs. penitentiary vs. AT vs. Slow VT. S/p DCCV on 2/28 back into sinus rhythm.  - Coumadin as above.   - Continue Digoxin to support RV. Creatinine is OK     Retrosternal Hematoma. CT chest 2/26: measuring 5.1 cm. S/P chest tube per CVTS 3/5, d/c'ed 3/15. Chest X-ray 3/18 with stable fluid to right interlobular fissure, Hgb stable.   - Follow with [facility]      Follow up   That scheduled next week.    Sincerely,      Nader Cisneros MD     [facility] Division of Cardiology

## 2020-04-08 NOTE — PROGRESS NOTES
D:  Follow up phone call made regarding telephone appointment.    I:    Follow up plan: inperson visit for next Tuesday's appt. Plan for weekly dressing, and shower bag education at that time.  Pt scheduled for labs & a speed optimization echo prior to appointment.  Pt did report some concerns with a rash under the gauze portion of his dressing.  Advised pt to allow for air time and make sure that it is not getting too hot under the dressing.  We will evaluate at his next appointment.      *It was advised by Dr. Cisneros that Eliseo's wife Chapis, who works in a hospital, not return to work for at least 4 weeks r/t the health risks posed to Eliseo by the potential exposure to COVID-19.  Letter written, to be given to pt next appointment.    Patient/caregiver check in 3 weeks post discharge. Pt reports VAD parameters WNL and weight grossly stable. Reviewed medications, refills sent to home pharmacy and answered any questions. Patient reports sleeping well and no anxiety since being home with LVAD. Patient is able to move around the house and care for himself independently.     Discussed specific new problems/stressors since being discharged from the hospital: none.  Empathized with patient and reviewed coping strategies: enlisting support from friends and love ones, attending patient and caregiver support groups, reviewing LVAD educational materials to reinforce knowledge, and talking about concerns with family/care providers/trusted others. Encouraged pt to page VAD Coordinator with any issues or questions. Pt verbalizes understanding.

## 2020-04-08 NOTE — LETTER
April 10, 2020    To whom it may concern,     Eliseo Tanner is a patient under my care at the [facility].  Due to his underlying health conditions and the threat posed by the current outbreak of COVID-19, I recommend that all reasonable accommodations be made to allow Jed Tanner, his wife, to work remotely to protect Eliseo s health.  If these options are not possible, we are requesting that Jed be placed on disability until May 11th, 2020.     Thank you for your assistance in keeping Eliseo as healthy as possible.  If you have further questions, please call us at the [facility] at [phone].       Sincerely,      Nader Cisneros MD  Heart Failure, Mechanical Circulatory Support and Transplant Cardiology   of Medicine,  Division of Cardiology, [facility]

## 2020-04-08 NOTE — PROGRESS NOTES
ANTICOAGULATION FOLLOW-UP CLINIC VISIT    Patient Name:  Eliseo Tanner  Date:  4/8/2020  Contact Type:  Telephone    SUBJECTIVE:         OBJECTIVE    INR   Date Value Ref Range Status   04/08/2020 3.61 (H) 0.86 - 1.14 Final     Chromogenic Factor 10   Date Value Ref Range Status   03/16/2020 27 (L) 70 - 130 % Final     Comment:     Therapeutic Range:  A Chromogenic Factor 10 level of approximately 20-40%   inversely correlates with an INR of 2-3 for patients receiving Warfarin.   Chromogenic Factor 10 levels below 20% indicate an INR greater than 3 and   levels above 40% indicate an INR less than 2.         ASSESSMENT / PLAN  No question data found.  Anticoagulation Summary  As of 4/8/2020    INR goal:   2.0-3.0   TTR:   23.5 % (1.9 wk)   INR used for dosing:   3.61! (4/8/2020)   Warfarin maintenance plan:   No maintenance plan   Full warfarin instructions:   4/8: 4 mg; 4/9: 4 mg; 4/10: 4 mg; 4/11: 4 mg; 4/12: 4 mg; 4/13: 4 mg; Otherwise No maintenance plan   Next INR check:   4/14/2020   Priority:   Critical   Target end date:   Indefinite    Indications    LVAD (left ventricular assist device) present (H) [Z95.811]             Anticoagulation Episode Summary     INR check location:       Preferred lab:       Send INR reminders to:   [facility]    Comments:   Patient is staying at [facility].  Patient on Amiodarone  HM 3  placed 2/18/2020, Speak to spouse, Veronique at [phone]      Anticoagulation Care Providers     Provider Role Specialty Phone number    Gucci Tomas MD Referring Cardiology [phone]            See the Encounter Report to view Anticoagulation Flowsheet and Dosing Calendar (Go to Encounters tab in chart review, and find the Anticoagulation Therapy Visit)    Spoke with patient.     Gloria Abbasi RN

## 2020-04-10 DIAGNOSIS — I50.22 CHRONIC SYSTOLIC CONGESTIVE HEART FAILURE (H): Primary | ICD-10-CM

## 2020-04-13 NOTE — PROGRESS NOTES
"Addendum 4/16/2020  Shortly after leaving clinic, he felt more SOB and dizzy. He sent an ICD check which revealed regular rhythm but tachycardic. No arrhythmia. Returned to clinic and reduced speed back to 5500. Walters better within 10 minutes. After return to 5500 rpm he felt \"10/10\". We will leave his speed here. Given that he did not tolerate higher speed, he may need increased diuretics. He zeus lcall us if weight hits 200 and we will increase lasix.    This was an inperson visit.     HPI:   Eliseo Tanner is a 64 year old male with chronic systolic heart failure secondary to NICM now s/p HM 3 on 2/18, moderate CAD, HTN, ABHINAV on CPAP, DM2, CKD Stage III, ANA. His HM3 post-op course was complicated by retrosternal hematoma and bleeding in the lungs, RV failure,VT in ICU now on amiodarone and Afib w/AVR S/p DCCV on 2/28. He had pre-op proteus and enterococcus bacteremia from 2/13, s/p abx. He had an ICD placed on 3/16/2020. He presents today for an inperson visit for LVAD follow-up.    Last visit 4/8/2020 with Dr. Cisneros  No medication changes or concerns.    This visit  Is been feeling very well.  He can walk 3-4 blocks before he would have dyspnea on exertion.  He has been walking up and down the floor because of her progression.  No shortness of breath at rest.  No lower extremity edema, abdominal edema, orthopnea or PND.  No lightheadedness or dizziness.  No palpitations, chest pain.  His appetite is normal.    He denies blood in the urine or blood in the stool.  No driveline drainage or pain.  He does have a small amount of redness which is new.  He thinks it is due to the clinic solution.    Denies stroke symptoms.    Cardiac Medications  ASA 81 mg daily  Coumadin  Amiodarone 200 mg daily- for post-op VT s/p external shock  Atorvastatin 20 mg daily  Digoxin 125 mcg daily  Lasix 40 mg daily  Kcl 20 meq daily  Hydralazine 100 mg TID  Lisinopril 10 mg daily    PAST MEDICAL HISTORY:  No past medical history on " file.    FAMILY HISTORY:  No family history on file.    SOCIAL HISTORY:  Social History     Socioeconomic History     Marital status:      Spouse name: Not on file     Number of children: Not on file     Years of education: Not on file     Highest education level: Not on file   Occupational History     Not on file   Social Needs     Financial resource strain: Not on file     Food insecurity     Worry: Not on file     Inability: Not on file     Transportation needs     Medical: Not on file     Non-medical: Not on file   Tobacco Use     Smoking status: Not on file   Substance and Sexual Activity     Alcohol use: Not on file     Drug use: Not on file     Sexual activity: Not on file   Lifestyle     Physical activity     Days per week: Not on file     Minutes per session: Not on file     Stress: Not on file   Relationships     Social connections     Talks on phone: Not on file     Gets together: Not on file     Attends Restorationist service: Not on file     Active member of club or organization: Not on file     Attends meetings of clubs or organizations: Not on file     Relationship status: Not on file     Intimate partner violence     Fear of current or ex partner: Not on file     Emotionally abused: Not on file     Physically abused: Not on file     Forced sexual activity: Not on file   Other Topics Concern     Not on file   Social History Narrative     Not on file       CURRENT MEDICATIONS:  acetaminophen (TYLENOL) 325 MG tablet, Take 2 tablets (650 mg) by mouth every 4 hours as needed for mild pain or fever  allopurinol (ZYLOPRIM) 100 MG tablet, 2 tablets (200 mg) by Oral or Feeding Tube route daily  aspirin (ASA) 81 MG EC tablet, Take 1 tablet (81 mg) by mouth daily  atorvastatin (LIPITOR) 20 MG tablet, 1 tablet (20 mg) by Oral or Feeding Tube route every evening  diclofenac (VOLTAREN) 1 % topical gel, Apply 4 g topically 4 times daily as needed for moderate pain  digoxin (LANOXIN) 125 MCG tablet, Take 1 tablet  (125 mcg) by mouth daily  furosemide (LASIX) 20 MG tablet, Take 1 tablet (20 mg) by mouth daily  hydrALAZINE (APRESOLINE) 100 MG tablet, Take 1 tablet (100 mg) by mouth 3 times daily  insulin glargine (LANTUS PEN) 100 UNIT/ML pen, Inject 10 Units Subcutaneous At Bedtime  insulin pen needle (BD JAIME U/F) 32G X 4 MM miscellaneous,   magnesium oxide (MAG-OX) 400 MG tablet, 1 tablet (400 mg) by Oral or Feeding Tube route daily  methocarbamol (ROBAXIN) 500 MG tablet, Take 1 tablet (500 mg) by mouth 4 times daily as needed for muscle spasms  MULTIPLE MINERALS-VITAMINS PO, Take 1 tablet by mouth daily  multivitamin w/minerals (THERA-VIT-M) tablet, Take 1 tablet by mouth daily  warfarin ANTICOAGULANT (COUMADIN) 4 MG tablet, Take 4-8mg daily or as directed by the coumadin clinic  albuterol (PROAIR HFA/PROVENTIL HFA/VENTOLIN HFA) 108 (90 Base) MCG/ACT inhaler, Inhale 2 puffs into the lungs every 6 hours as needed for wheezing (Patient not taking: Reported on 2020)  [] cephALEXin (KEFLEX) 500 MG capsule, Take 1 capsule (500 mg) by mouth 3 times daily for 5 days  [] cephalexin 500 MG tablet, Take 500 mg by mouth 3 times daily for 5 days  co-enzyme Q-10 200 MG CAPS, 200 mg by Oral or Feeding Tube route daily  FLUoxetine (PROZAC) 20 MG capsule, Take 1 capsule (20 mg) by mouth daily (Patient not taking: Reported on 2020)  ondansetron (ZOFRAN) 4 MG tablet, Take 4 mg by mouth every 4 hours as needed for nausea    No current facility-administered medications on file prior to visit.       ROS:   CONSTITUTIONAL: Denies fever, chills, fatigue.  HEENT: Denies headache, vision changes, and changes in speech.   CV: Refer to HPI.   PULMONARY:Refer to HPI.   GI:Denies nausea, vomiting, diarrhea, and abdominal pain. Bowel movements are regular.   :Denies urinary alterations, dysuria, urinary frequency, hematuria, and abnormal drainage.   EXT:See HPI  SKIN:Denies abnormal rashes or lesions.   MUSCULOSKELETAL:Denies  "upper or lower extremity weakness and pain.   NEUROLOGIC:Denies seizures, or upper or lower extremity paresthesia.     EXAM:  BP (!) 88/0 (BP Location: Right arm, Patient Position: Sitting, Cuff Size: Adult Regular)   Pulse 78   Temp 98.3  F (36.8  C)   Ht 1.702 m (5' 7\")   Wt 91.4 kg (201 lb 6.4 oz)   SpO2 99%   BMI 31.54 kg/m      GENERAL: Appears comfortable, in no distress, speaking in full sentances and able to communicate all needs.  HEENT: Eye symmetrical and without discharge or icterus bilaterally. Mucous membranes moist and without lesions.  NECK: Supple, JVD ~10  CV: Hum of HM3  RESPIRATORY: Respirations regular, even, and unlabored. Decreased lung sounds at right lower lung base  GI: Soft and non distended with normoactive bowel sounds present in all quadrants. No tenderness, rebound, guarding. No organomegaly.   EXTREMITIES: No peripheral edema. None pulsatile.   NEUROLOGIC: Alert and interacting appropiratly No focal deficits.   MUSCULOSKELETAL: No joint swelling or tenderness.   SKIN: No jaundice. No rashes or lesions. Driveline dressing c/d/i. Driveline site per coordinator.    Labs - as reviewed in clinic with patient today:  CBC RESULTS:  Lab Results   Component Value Date    WBC 8.5 04/14/2020    RBC 4.41 04/14/2020    HGB 10.5 (L) 04/14/2020    HCT 36.3 (L) 04/14/2020    MCV 82 04/14/2020    MCH 23.8 (L) 04/14/2020    MCHC 28.9 (L) 04/14/2020    RDW 18.1 (H) 04/14/2020     (H) 04/14/2020       CMP RESULTS:  Lab Results   Component Value Date     04/14/2020    POTASSIUM 4.3 04/14/2020    CHLORIDE 99 04/14/2020    CO2 25 04/14/2020    ANIONGAP 9 04/14/2020     (H) 04/14/2020    BUN 31 (H) 04/14/2020    CR 1.30 (H) 04/14/2020    GFRESTIMATED 57 (L) 04/14/2020    GFRESTBLACK 66 04/14/2020    CALOS 8.6 04/14/2020    BILITOTAL 0.4 04/14/2020    ALBUMIN 3.5 04/14/2020    ALKPHOS 146 04/14/2020    ALT 52 04/14/2020    AST 42 04/14/2020        INR RESULTS:  Lab Results   Component " "Value Date    INR 2.71 (H) 04/14/2020       Lab Results   Component Value Date    MAG 1.9 04/08/2020     Lab Results   Component Value Date    NTBNPI 12,559 (H) 02/08/2020     No results found for: NTBNP    Diagnostics    ICD Check 4/2/2020  Patient initiated remote ICD transmission received and reviewed.  Device transmission sent per MD orders.  Patient has a Medtronic single lead ICD.  Normal ICD function.  43 AF episodes recorded since 3/21/20, longest of 56 minutes, AF burden = 3.1%.  No ventricular arrhythmias recorded.  ICD settings:  Monitor zone @ 176-222 bpm, VF zone @ 222 bpm.   Presenting EGM = Irregular VS @  bpm.    =<0.1%.  OptiVol fluid index is establishing baseline. Estimated battery longevity to PERLA = 11.5 years.   Battery voltage = 3.15V.  No short v-v intervals recorded. Lead trends appear stable.  Patient had an episode of \"dizziness and clamminess over the weekend\" per LVAD coordinatorValeria.  Patient is scheduled for appointment with STEW Padron today.  Plan for patient to return to clinic in 3 months as scheduled.  DEIDRA Marcos RN.      Remote ICD transmission    CXR 3/9/2020:  1.  Stable supportive lines and tubes.  2.  Cardiomegaly with LVAD placement, stable.  3.  Right apical hemothorax evacuation. Right mid/lower lobe  subsegmental atelectasis, overall stable.     CT Chest 3/4/2020:  1. Grossly stable large right apical hemothorax compared to recent  radiographs. Tiny nondisplaced fractures of the medial right first and  second ribs. Tiny right pneumothorax.   2. Intrafissural position of the right basilar chest tube abutting the  pericardium and with the tip immediately inferior to the right hilar  vasculature.   3. Small left pleural effusion, which also contains hyperdense fluid  suspicious for small component of left-sided hemothorax.  4. Stable retrosternal hematoma.  5. Cardiomegaly with small pericardial effusion versus  hemopericardium. Stable LVAD and " pericardial drain.     Echo 2/25/2020:  Left ventricular size is normal. Severely reduced left ventricular systolic  function. Septum is midline  LVAD inflow or outflow cannulae not visualized. Doppler velocities are not  elevated.  Moderate to severely reduced right ventricular systolic function.  Aortic valve appears closed during the entire cardiac cycle. Trace aortic  insufficiency is present.  IVC is plethoric.  No pericardial effusion present.    Assessment and Plan:   Eliseo Tanner is a 64 year old male with chronic systolic heart failure secondary to NICM now s/p HM 3 on 2/18, moderate CAD, HTN, ABHINAV on CPAP, DM2, CKD Stage III, ANA. His HM3 post-op course was complicated by retrosternal hematoma, RV failure,VT in ICU now on amiodarone and Afib w/AVR S/p DCCV on 2/28. He had pre-op proteus and enterococcus bacteremia from 2/13, s/p abx. He had an ICD placed on 3/16/2020.  He presents today for routine clinic visit.    He looks very good in clinic today overall, getting stronger and moving very well.  His weight has been quite stable and he does not have swelling in his legs or in his belly but his neck vein is still up.  We will increase his diuretic a few weeks ago, he does not feel any different.  He have not had any major changes to his labs.      He had a speed optimization study today.  We will increase his speed from 5500 to 5600.  We will plan to have him stay overnight in the [facility], if he feels okay in the morning he can go.  If he is feeling more short of breath, more swelling, or any other concerns will bring him back to clinic to turn his speed down.  I do think that he needs additional diuresis, hopefully this be increase well help with that.  He is very slightly hypertensive, we will plan for speed change to get some fluid off and no other medication changes.    Chronic SCHF secondary to NICM s/p HM III with RV dysfunction. Implanted 2/18 and complicated by bleeding. Echo at 5600  "rpm notes mild-moderate RV dysfunction with AV closed and trace AI, current speed 5500 rpm.   Stage D, NYHA Class IIIA  ACEi/ARB Lisinopril 10 mg po daily. Increase Hydralazine 100 mg po TID.   BB Defer s/p recent LVAD w/right heart failure on digoxin  Aldosterone antagonist deferred while other medical therapy is prioritized  SCD prophylaxis ICD implanted 3/16.  Fluid status Hypervolemic: Increase speed to 5600, if weight hits 200 lbs, will call for increased diuretic plan  MAP: 80- no medication changes today  LDH: stable  Anticoagulation: Coumadin per pharmacy.  Goal 2-3.  Antiplatelet: ASA 81 mg po daily  Speed: Speed op study today: Will increase speed from 5500 to 5600  - Continue Digoxin for RV support.  Shortly after leaving clinic, he felt more SOB and dizzy. He sent an ICD check which revealed regular rhythm but tachycardic. No arrhythmia. Returned to clinic and reduced speed back to 5500. [place] better within 10 minutes. After return to 5500 rpm he felt \"10/10\". We will leave his speed here. Given that he did not tolerate higher speed, he may need increased diuretics. He zeus lcall us if weight hits 200 and we will increase lasix.    VAD interrogation today: VAD interrogation reviewed with VAD coordinator. Agree with findings. Rare PI events, no power spikes, speed drops, or other findings suspicious of pump malfunction noted.     HTN. Map controlled today at 80. Will consider transitioning from hydralazine to higher lisinopril post-covid to avoid fequent need for labs  - Continue Lisinopril   - Continue hydralazine to three times a day (was only taking twice)    New Onset Afib s/p DCCV/history of Aflutter. ECG consistent with Afib w/ RVR and aberrancy vs. long-term vs. AT vs. Slow VT. S/p DCCV on 2/28 back into sinus rhythm.  - Coumadin as above.   - Continue Digoxin.     Retrosternal Hematoma. CT chest 2/26: measuring 5.1 cm. S/P chest tube per CVTS 3/5, d/c'ed 3/15. Chest X-ray 3/18 with stable fluid to right " interlobular fissure, Hgb stable.   - Follow with [facility]    Follow up   - Clinic in one month      BIJAL Padron TAMAS

## 2020-04-14 ENCOUNTER — HOSPITAL ENCOUNTER (OUTPATIENT)
Dept: CARDIOLOGY | Facility: CLINIC | Age: 65
End: 2020-04-14
Attending: INTERNAL MEDICINE
Payer: MEDICARE

## 2020-04-14 ENCOUNTER — CARE COORDINATION (OUTPATIENT)
Dept: CARDIOLOGY | Facility: CLINIC | Age: 65
End: 2020-04-14

## 2020-04-14 ENCOUNTER — ANCILLARY PROCEDURE (OUTPATIENT)
Dept: CARDIOLOGY | Facility: CLINIC | Age: 65
End: 2020-04-14
Attending: NURSE PRACTITIONER
Payer: MEDICARE

## 2020-04-14 ENCOUNTER — ANTICOAGULATION THERAPY VISIT (OUTPATIENT)
Dept: ANTICOAGULATION | Facility: CLINIC | Age: 65
End: 2020-04-14

## 2020-04-14 VITALS
HEART RATE: 78 BPM | HEIGHT: 67 IN | TEMPERATURE: 98.3 F | BODY MASS INDEX: 31.61 KG/M2 | WEIGHT: 201.4 LBS | SYSTOLIC BLOOD PRESSURE: 88 MMHG | OXYGEN SATURATION: 99 %

## 2020-04-14 DIAGNOSIS — Z95.811 LVAD (LEFT VENTRICULAR ASSIST DEVICE) PRESENT (H): ICD-10-CM

## 2020-04-14 DIAGNOSIS — I50.21 ACUTE SYSTOLIC CONGESTIVE HEART FAILURE (H): Primary | ICD-10-CM

## 2020-04-14 DIAGNOSIS — I50.22 CHRONIC SYSTOLIC CONGESTIVE HEART FAILURE (H): ICD-10-CM

## 2020-04-14 DIAGNOSIS — Z95.810 S/P ICD (INTERNAL CARDIAC DEFIBRILLATOR) PROCEDURE: ICD-10-CM

## 2020-04-14 LAB
ALBUMIN SERPL-MCNC: 3.5 G/DL (ref 3.4–5)
ALP SERPL-CCNC: 146 U/L (ref 40–150)
ALT SERPL W P-5'-P-CCNC: 52 U/L (ref 0–70)
ANION GAP SERPL CALCULATED.3IONS-SCNC: 9 MMOL/L (ref 3–14)
AST SERPL W P-5'-P-CCNC: 42 U/L (ref 0–45)
BILIRUB SERPL-MCNC: 0.4 MG/DL (ref 0.2–1.3)
BUN SERPL-MCNC: 31 MG/DL (ref 7–30)
CALCIUM SERPL-MCNC: 8.6 MG/DL (ref 8.5–10.1)
CHLORIDE SERPL-SCNC: 99 MMOL/L (ref 94–109)
CO2 SERPL-SCNC: 25 MMOL/L (ref 20–32)
CREAT SERPL-MCNC: 1.3 MG/DL (ref 0.66–1.25)
D DIMER PPP FEU-MCNC: 5.7 UG/ML FEU (ref 0–0.5)
ERYTHROCYTE [DISTWIDTH] IN BLOOD BY AUTOMATED COUNT: 18.1 % (ref 10–15)
GFR SERPL CREATININE-BSD FRML MDRD: 57 ML/MIN/{1.73_M2}
GLUCOSE SERPL-MCNC: 224 MG/DL (ref 70–99)
HCT VFR BLD AUTO: 36.3 % (ref 40–53)
HGB BLD-MCNC: 10.5 G/DL (ref 13.3–17.7)
INR PPP: 2.71 (ref 0.86–1.14)
LDH SERPL L TO P-CCNC: 296 U/L (ref 85–227)
MCH RBC QN AUTO: 23.8 PG (ref 26.5–33)
MCHC RBC AUTO-ENTMCNC: 28.9 G/DL (ref 31.5–36.5)
MCV RBC AUTO: 82 FL (ref 78–100)
PLATELET # BLD AUTO: 458 10E9/L (ref 150–450)
POTASSIUM SERPL-SCNC: 4.3 MMOL/L (ref 3.4–5.3)
PROT SERPL-MCNC: 8.8 G/DL (ref 6.8–8.8)
RBC # BLD AUTO: 4.41 10E12/L (ref 4.4–5.9)
SODIUM SERPL-SCNC: 134 MMOL/L (ref 133–144)
WBC # BLD AUTO: 8.5 10E9/L (ref 4–11)

## 2020-04-14 PROCEDURE — 93306 TTE W/DOPPLER COMPLETE: CPT

## 2020-04-14 PROCEDURE — 80053 COMPREHEN METABOLIC PANEL: CPT | Performed by: PHYSICIAN ASSISTANT

## 2020-04-14 PROCEDURE — 85027 COMPLETE CBC AUTOMATED: CPT | Performed by: PHYSICIAN ASSISTANT

## 2020-04-14 PROCEDURE — 36415 COLL VENOUS BLD VENIPUNCTURE: CPT | Performed by: PHYSICIAN ASSISTANT

## 2020-04-14 PROCEDURE — 83615 LACTATE (LD) (LDH) ENZYME: CPT | Performed by: PHYSICIAN ASSISTANT

## 2020-04-14 PROCEDURE — 93306 TTE W/DOPPLER COMPLETE: CPT | Mod: 26 | Performed by: INTERNAL MEDICINE

## 2020-04-14 PROCEDURE — 93296 REM INTERROG EVL PM/IDS: CPT | Mod: ZF

## 2020-04-14 PROCEDURE — 85379 FIBRIN DEGRADATION QUANT: CPT | Performed by: PHYSICIAN ASSISTANT

## 2020-04-14 PROCEDURE — G0463 HOSPITAL OUTPT CLINIC VISIT: HCPCS | Mod: 25,ZF

## 2020-04-14 PROCEDURE — 99215 OFFICE O/P EST HI 40 MIN: CPT | Mod: 25 | Performed by: PHYSICIAN ASSISTANT

## 2020-04-14 PROCEDURE — 85610 PROTHROMBIN TIME: CPT | Performed by: PHYSICIAN ASSISTANT

## 2020-04-14 PROCEDURE — 93750 INTERROGATION VAD IN PERSON: CPT | Mod: ZF | Performed by: PHYSICIAN ASSISTANT

## 2020-04-14 PROCEDURE — 99207 CARDIAC DEVICE CHECK - REMOTE: CPT | Performed by: INTERNAL MEDICINE

## 2020-04-14 RX ORDER — LISINOPRIL 10 MG/1
10 TABLET ORAL DAILY
Qty: 90 TABLET | Refills: 3 | Status: SHIPPED | OUTPATIENT
Start: 2020-04-14 | End: 2020-06-26

## 2020-04-14 RX ORDER — PANTOPRAZOLE SODIUM 40 MG/1
40 TABLET, DELAYED RELEASE ORAL
Qty: 30 TABLET | Refills: 0 | Status: SHIPPED | OUTPATIENT
Start: 2020-04-14 | End: 2020-05-15

## 2020-04-14 RX ORDER — POTASSIUM CHLORIDE 1500 MG/1
20 TABLET, EXTENDED RELEASE ORAL DAILY
Qty: 90 TABLET | Refills: 3 | Status: SHIPPED | OUTPATIENT
Start: 2020-04-14 | End: 2020-05-27

## 2020-04-14 RX ORDER — AMIODARONE HYDROCHLORIDE 200 MG/1
200 TABLET ORAL DAILY
Qty: 90 TABLET | Refills: 3 | Status: SHIPPED | OUTPATIENT
Start: 2020-04-14 | End: 2021-04-19

## 2020-04-14 ASSESSMENT — PAIN SCALES - GENERAL: PAINLEVEL: NO PAIN (0)

## 2020-04-14 ASSESSMENT — MIFFLIN-ST. JEOR: SCORE: 1652.17

## 2020-04-14 NOTE — PATIENT INSTRUCTIONS
Medications:  1. No medication changes.     Follow-up:  1. Clinic appointment 5/19 as previously arranged.     Instructions:  1. We increased your speed today. If you feel fatigued, short of breath, notice swelling in legs or abdomen, please call VAD Coordinator on-call.  If you feel well, please call Gladys or page VAD Coordinator on-call tomorrow morning. If you do feel well, then you are cleared to go home.     2. Please use betadine with your dressing change. Call VAD Coordinator if rash does not improve after a few days or if you notice drainage.    Page the VAD Coordinator on call if you gain more than 3 lb in a day or 5 in a week. Please also page if you feel unwell or have alarms.     Great to see you in clinic today. To Page the VAD Coordinator on call, dial [phone] option #4 and ask to speak to the VAD coordinator on call.

## 2020-04-14 NOTE — PROGRESS NOTES
VAD coordinator present for VAD speed optimization echo.   VAD Coordinator adjusted speed, monitored VAD parameters and EKG, LV size, patient tolerance, and recorded findings according to Speed Optimization protocol. See Speed Optimization sheet for full results.   Parameters pre-optimization: Flow: 4.5 lpm, Speed: 5500 rpm, PI: 3.4, Power: 4.6 muhammad  Speed range evaluated: 5300 - 5800 rpm's. Protocol guidelines followed.

## 2020-04-14 NOTE — NURSING NOTE
.  Chief Complaint   Patient presents with     RECHECK     Post VAD Implant 2/18/20   Vitals were taken and medications were reconciled.    Kay Fisher  1:01 PM

## 2020-04-14 NOTE — NURSING NOTE
1). PUMP DATA  Primary controller serial number: [id number]      HM 3:   Flow: 4.3 L/min,    Speed: 5500 RPMs,     PI: 3.5 ,  Power: 4.6 Driver, Hct: 36     Today, increased speed to 5600rpms. At 5600, flow 4.4lpm, P I3.4 and power 4.7.    Primary controller   Back up battery: Patient use: 9, Replace in: 30  Months     Data downloaded: No  Equipment and driveline assessed for damage: Yes     Back up : Serial number: [id number]  Back up battery: Patient use: 11 Replace in: 30  Months  Programmed settings identical to the settings on the primary controller : N/A      Education complete: Yes   Charge the BACKUP controller s backup battery every 6 months  Perform a self test on BACKUP every 6 months  Change the MPU s batteries every 6 months:Yes        2). ALARMS  Alarms reported by patient since last pump evaluation: No  Alarms or other finding noted during pump data history and alarm download: Yes, rare PI events with no speed drops.    Action Taken:  Reviewed data with patient: Yes      3). DRESSING CHANGE / DRIVELINE ASSESSMENT  Dressing change completed today: Yes  Appearance of Driveline site: scant amt of serous drainage. Blistering rash suggestive of allergy or sensitivity to chlorhexidine.  Instructed pt to use betadine swabs. Wife and pt verbalized understanding.  Also taught pt's wife how to do weekly dressings.  Instructed them to continue using daily dressings for another week or two and then to call VAD Coordinator to assess if pt is ready to switch to weekly once rash heals. Verbalized understanding.      Driveline stabilization: Method: [employer]  [ Teaching reinforced on need for stabilization of Driveline. ]

## 2020-04-14 NOTE — PROGRESS NOTES
"D: Patient paged VAD Coordinator on-call stating that he felt \"woozy\", fatigued, and felt heart pounding intermittently since he left clinic a couple hours ago.  Pt's speed was increased from 5500 to 5600rpms in clinic.   I: This writer called patient. Pt reported that he had just sent a remote ICD transmission.   Discussed with STEW Turcios.  Per Karolina, instructed pt to return to clinic and Karolina will decrease speed back to 5500rpm.    A: Pt verbalized understanding.  P: Await remote ICD transmission results. Pt heading back to clinic to have speed reduced back to 5500rpm. Pt instructed to page VAD Coordinator on-call if he is not feeling well or with further concerns; pt verbalized understanding.    "

## 2020-04-14 NOTE — PROGRESS NOTES
ANTICOAGULATION FOLLOW-UP CLINIC VISIT    Patient Name:  Eliseo Tanner  Date:  2020  Contact Type:  Telephone    SUBJECTIVE:         OBJECTIVE    INR   Date Value Ref Range Status   2020 2.71 (H) 0.86 - 1.14 Final     Chromogenic Factor 10   Date Value Ref Range Status   2020 27 (L) 70 - 130 % Final     Comment:     Therapeutic Range:  A Chromogenic Factor 10 level of approximately 20-40%   inversely correlates with an INR of 2-3 for patients receiving Warfarin.   Chromogenic Factor 10 levels below 20% indicate an INR greater than 3 and   levels above 40% indicate an INR less than 2.         ASSESSMENT / PLAN  INR assessment THER    Recheck INR In: 1 WEEK    INR Location Clinic      Anticoagulation Summary  As of 2020    INR goal:   2.0-3.0   TTR:   26.2 % (2.7 wk)   INR used for dosin.71 (2020)   Warfarin maintenance plan:   No maintenance plan   Full warfarin instructions:   14: 4 mg; 4/15: 4 mg; 4/16: 4 mg; 4/17: 4 mg; 4/18: 4 mg; 4/19: 4 mg; 4/20: 4 mg; Otherwise No maintenance plan   Next INR check:   2020   Priority:   Critical   Target end date:   Indefinite    Indications    LVAD (left ventricular assist device) present (H) [Z95.811]             Anticoagulation Episode Summary     INR check location:       Preferred lab:       Send INR reminders to:   [facility]    Comments:   Patient is staying at [facility].  Patient on Amiodarone   3  placed 2020, Speak to spouse, Veronique at [phone]      Anticoagulation Care Providers     Provider Role Specialty Phone number    Gucci Tomas MD Referring Cardiology [phone]            See the Encounter Report to view Anticoagulation Flowsheet and Dosing Calendar (Go to Encounters tab in chart review, and find the Anticoagulation Therapy Visit)    Left message for patient with results and dosing recommendations. Asked patient to call back to report any missed doses, falls, signs and symptoms of  bleeding or clotting, any changes in health, medication, or diet. Asked patient to call back with any questions or concerns.  Message left on spouse, Veronique's phone.    Patient had LVAD placed on:   2/18/2020  Type of LVAD: HM 3  LVAD Protocol followed: Yes     Krysta Mcdaniel RN        4/14: Veronique called back.  They are hoping to go home to [place], [place] tomorrow, after seeing provider.  Eliseo will then go to the [facility] for labs.  Standing INR order faxed to:  [phone]   PH:  [phone] opt 5.  Veronique will call back if Eliseo has any medicine changes at his appointment today.       Krysta Mcdaniel RN

## 2020-04-14 NOTE — LETTER
"4/14/2020      RE: Eliseo Tanner  [address] [facility] [address]       Dear Colleague,    Thank you for the opportunity to participate in the care of your patient, Eliseo Tanner, at the [facility] HEART [facility]. Please see a copy of my visit note below.    Addendum 4/16/2020  Shortly after leaving clinic, he felt more SOB and dizzy. He sent an ICD check which revealed regular rhythm but tachycardic. No arrhythmia. Returned to clinic and reduced speed back to 5500. [place] better within 10 minutes. After return to 5500 rpm he felt \"10/10\". We will leave his speed here. Given that he did not tolerate higher speed, he may need increased diuretics. He zeus lcall us if weight hits 200 and we will increase lasix.    This was an inperson visit.     HPI:   Eliseo Tanner is a 64 year old male with chronic systolic heart failure secondary to NICM now s/p HM 3 on 2/18, moderate CAD, HTN, ABHINAV on CPAP, DM2, CKD Stage III, ANA. His HM3 post-op course was complicated by retrosternal hematoma and bleeding in the lungs, RV failure,VT in ICU now on amiodarone and Afib w/AVR S/p DCCV on 2/28. He had pre-op proteus and enterococcus bacteremia from 2/13, s/p abx. He had an ICD placed on 3/16/2020. He presents today for an inperson visit for LVAD follow-up.    Last visit 4/8/2020 with Dr. Cisneros  No medication changes or concerns.    This visit  Is been feeling very well.  He can walk 3-4 blocks before he would have dyspnea on exertion.  He has been walking up and down the floor because of her progression.  No shortness of breath at rest.  No lower extremity edema, abdominal edema, orthopnea or PND.  No lightheadedness or dizziness.  No palpitations, chest pain.  His appetite is normal.    He denies blood in the urine or blood in the stool.  No driveline drainage or pain.  He does have a small amount of redness which is new.  He thinks it is due to the clinic " solution.    Denies stroke symptoms.    Cardiac Medications  ASA 81 mg daily  Coumadin  Amiodarone 200 mg daily- for post-op VT s/p external shock  Atorvastatin 20 mg daily  Digoxin 125 mcg daily  Lasix 40 mg daily  Kcl 20 meq daily  Hydralazine 100 mg TID  Lisinopril 10 mg daily    PAST MEDICAL HISTORY:  No past medical history on file.    FAMILY HISTORY:  No family history on file.    SOCIAL HISTORY:  Social History     Socioeconomic History     Marital status:      Spouse name: Not on file     Number of children: Not on file     Years of education: Not on file     Highest education level: Not on file   Occupational History     Not on file   Social Needs     Financial resource strain: Not on file     Food insecurity     Worry: Not on file     Inability: Not on file     Transportation needs     Medical: Not on file     Non-medical: Not on file   Tobacco Use     Smoking status: Not on file   Substance and Sexual Activity     Alcohol use: Not on file     Drug use: Not on file     Sexual activity: Not on file   Lifestyle     Physical activity     Days per week: Not on file     Minutes per session: Not on file     Stress: Not on file   Relationships     Social connections     Talks on phone: Not on file     Gets together: Not on file     Attends Scientologist service: Not on file     Active member of club or organization: Not on file     Attends meetings of clubs or organizations: Not on file     Relationship status: Not on file     Intimate partner violence     Fear of current or ex partner: Not on file     Emotionally abused: Not on file     Physically abused: Not on file     Forced sexual activity: Not on file   Other Topics Concern     Not on file   Social History Narrative     Not on file       CURRENT MEDICATIONS:  acetaminophen (TYLENOL) 325 MG tablet, Take 2 tablets (650 mg) by mouth every 4 hours as needed for mild pain or fever  allopurinol (ZYLOPRIM) 100 MG tablet, 2 tablets (200 mg) by Oral or Feeding  Tube route daily  aspirin (ASA) 81 MG EC tablet, Take 1 tablet (81 mg) by mouth daily  atorvastatin (LIPITOR) 20 MG tablet, 1 tablet (20 mg) by Oral or Feeding Tube route every evening  diclofenac (VOLTAREN) 1 % topical gel, Apply 4 g topically 4 times daily as needed for moderate pain  digoxin (LANOXIN) 125 MCG tablet, Take 1 tablet (125 mcg) by mouth daily  furosemide (LASIX) 20 MG tablet, Take 1 tablet (20 mg) by mouth daily  hydrALAZINE (APRESOLINE) 100 MG tablet, Take 1 tablet (100 mg) by mouth 3 times daily  insulin glargine (LANTUS PEN) 100 UNIT/ML pen, Inject 10 Units Subcutaneous At Bedtime  insulin pen needle (BD JAIME U/F) 32G X 4 MM miscellaneous,   magnesium oxide (MAG-OX) 400 MG tablet, 1 tablet (400 mg) by Oral or Feeding Tube route daily  methocarbamol (ROBAXIN) 500 MG tablet, Take 1 tablet (500 mg) by mouth 4 times daily as needed for muscle spasms  MULTIPLE MINERALS-VITAMINS PO, Take 1 tablet by mouth daily  multivitamin w/minerals (THERA-VIT-M) tablet, Take 1 tablet by mouth daily  warfarin ANTICOAGULANT (COUMADIN) 4 MG tablet, Take 4-8mg daily or as directed by the coumadin clinic  albuterol (PROAIR HFA/PROVENTIL HFA/VENTOLIN HFA) 108 (90 Base) MCG/ACT inhaler, Inhale 2 puffs into the lungs every 6 hours as needed for wheezing (Patient not taking: Reported on 2020)  [] cephALEXin (KEFLEX) 500 MG capsule, Take 1 capsule (500 mg) by mouth 3 times daily for 5 days  [] cephalexin 500 MG tablet, Take 500 mg by mouth 3 times daily for 5 days  co-enzyme Q-10 200 MG CAPS, 200 mg by Oral or Feeding Tube route daily  FLUoxetine (PROZAC) 20 MG capsule, Take 1 capsule (20 mg) by mouth daily (Patient not taking: Reported on 2020)  ondansetron (ZOFRAN) 4 MG tablet, Take 4 mg by mouth every 4 hours as needed for nausea    No current facility-administered medications on file prior to visit.       ROS:   CONSTITUTIONAL: Denies fever, chills, fatigue.  HEENT: Denies headache, vision changes,  "and changes in speech.   CV: Refer to HPI.   PULMONARY:Refer to HPI.   GI:Denies nausea, vomiting, diarrhea, and abdominal pain. Bowel movements are regular.   :Denies urinary alterations, dysuria, urinary frequency, hematuria, and abnormal drainage.   EXT:See HPI  SKIN:Denies abnormal rashes or lesions.   MUSCULOSKELETAL:Denies upper or lower extremity weakness and pain.   NEUROLOGIC:Denies seizures, or upper or lower extremity paresthesia.     EXAM:  BP (!) 88/0 (BP Location: Right arm, Patient Position: Sitting, Cuff Size: Adult Regular)   Pulse 78   Temp 98.3  F (36.8  C)   Ht 1.702 m (5' 7\")   Wt 91.4 kg (201 lb 6.4 oz)   SpO2 99%   BMI 31.54 kg/m      GENERAL: Appears comfortable, in no distress, speaking in full sentances and able to communicate all needs.  HEENT: Eye symmetrical and without discharge or icterus bilaterally. Mucous membranes moist and without lesions.  NECK: Supple, JVD ~10  CV: Hum of HM3  RESPIRATORY: Respirations regular, even, and unlabored. Decreased lung sounds at right lower lung base  GI: Soft and non distended with normoactive bowel sounds present in all quadrants. No tenderness, rebound, guarding. No organomegaly.   EXTREMITIES: No peripheral edema. None pulsatile.   NEUROLOGIC: Alert and interacting appropiratly No focal deficits.   MUSCULOSKELETAL: No joint swelling or tenderness.   SKIN: No jaundice. No rashes or lesions. Driveline dressing c/d/i. Driveline site per coordinator.    Labs - as reviewed in clinic with patient today:  CBC RESULTS:  Lab Results   Component Value Date    WBC 8.5 04/14/2020    RBC 4.41 04/14/2020    HGB 10.5 (L) 04/14/2020    HCT 36.3 (L) 04/14/2020    MCV 82 04/14/2020    MCH 23.8 (L) 04/14/2020    MCHC 28.9 (L) 04/14/2020    RDW 18.1 (H) 04/14/2020     (H) 04/14/2020       CMP RESULTS:  Lab Results   Component Value Date     04/14/2020    POTASSIUM 4.3 04/14/2020    CHLORIDE 99 04/14/2020    CO2 25 04/14/2020    ANIONGAP 9 " "04/14/2020     (H) 04/14/2020    BUN 31 (H) 04/14/2020    CR 1.30 (H) 04/14/2020    GFRESTIMATED 57 (L) 04/14/2020    GFRESTBLACK 66 04/14/2020    CALOS 8.6 04/14/2020    BILITOTAL 0.4 04/14/2020    ALBUMIN 3.5 04/14/2020    ALKPHOS 146 04/14/2020    ALT 52 04/14/2020    AST 42 04/14/2020        INR RESULTS:  Lab Results   Component Value Date    INR 2.71 (H) 04/14/2020       Lab Results   Component Value Date    MAG 1.9 04/08/2020     Lab Results   Component Value Date    NTBNPI 12,559 (H) 02/08/2020     No results found for: NTBNP    Diagnostics    ICD Check 4/2/2020  Patient initiated remote ICD transmission received and reviewed.  Device transmission sent per MD orders.  Patient has a Medtronic single lead ICD.  Normal ICD function.  43 AF episodes recorded since 3/21/20, longest of 56 minutes, AF burden = 3.1%.  No ventricular arrhythmias recorded.  ICD settings:  Monitor zone @ 176-222 bpm, VF zone @ 222 bpm.   Presenting EGM = Irregular VS @  bpm.    =<0.1%.  OptiVol fluid index is establishing baseline. Estimated battery longevity to PERLA = 11.5 years.   Battery voltage = 3.15V.  No short v-v intervals recorded. Lead trends appear stable.  Patient had an episode of \"dizziness and clamminess over the weekend\" per LVAD coordinatorValeria.  Patient is scheduled for appointment with STEW Padron today.  Plan for patient to return to clinic in 3 months as scheduled.  DEIDRA Marcos RN.      Remote ICD transmission    CXR 3/9/2020:  1.  Stable supportive lines and tubes.  2.  Cardiomegaly with LVAD placement, stable.  3.  Right apical hemothorax evacuation. Right mid/lower lobe  subsegmental atelectasis, overall stable.     CT Chest 3/4/2020:  1. Grossly stable large right apical hemothorax compared to recent  radiographs. Tiny nondisplaced fractures of the medial right first and  second ribs. Tiny right pneumothorax.   2. Intrafissural position of the right basilar chest tube abutting " the  pericardium and with the tip immediately inferior to the right hilar  vasculature.   3. Small left pleural effusion, which also contains hyperdense fluid  suspicious for small component of left-sided hemothorax.  4. Stable retrosternal hematoma.  5. Cardiomegaly with small pericardial effusion versus  hemopericardium. Stable LVAD and pericardial drain.     Echo 2/25/2020:  Left ventricular size is normal. Severely reduced left ventricular systolic  function. Septum is midline  LVAD inflow or outflow cannulae not visualized. Doppler velocities are not  elevated.  Moderate to severely reduced right ventricular systolic function.  Aortic valve appears closed during the entire cardiac cycle. Trace aortic  insufficiency is present.  IVC is plethoric.  No pericardial effusion present.    Assessment and Plan:   Eliseo Tanner is a 64 year old male with chronic systolic heart failure secondary to NICM now s/p HM 3 on 2/18, moderate CAD, HTN, ABHINAV on CPAP, DM2, CKD Stage III, ANA. His HM3 post-op course was complicated by retrosternal hematoma, RV failure,VT in ICU now on amiodarone and Afib w/AVR S/p DCCV on 2/28. He had pre-op proteus and enterococcus bacteremia from 2/13, s/p abx. He had an ICD placed on 3/16/2020.  He presents today for routine clinic visit.    He looks very good in clinic today overall, getting stronger and moving very well.  His weight has been quite stable and he does not have swelling in his legs or in his belly but his neck vein is still up.  We will increase his diuretic a few weeks ago, he does not feel any different.  He have not had any major changes to his labs.      He had a speed optimization study today.  We will increase his speed from 5500 to 5600.  We will plan to have him stay [facility] in the [facility], if he feels okay in the morning he can go.  If he is feeling more short of breath, more swelling, or any other concerns will bring him back to clinic to turn his speed  "down.  I do think that he needs additional diuresis, hopefully this be increase well help with that.  He is very slightly hypertensive, we will plan for speed change to get some fluid off and no other medication changes.    Chronic SCHF secondary to NICM s/p HM III with RV dysfunction. Implanted 2/18 and complicated by bleeding. Echo at 5600 rpm notes mild-moderate RV dysfunction with AV closed and trace AI, current speed 5500 rpm.   Stage D, NYHA Class IIIA  ACEi/ARB Lisinopril 10 mg po daily. Increase Hydralazine 100 mg po TID.   BB Defer s/p recent LVAD w/right heart failure on digoxin  Aldosterone antagonist deferred while other medical therapy is prioritized  SCD prophylaxis ICD implanted 3/16.  Fluid status Hypervolemic: Increase speed to 5600, if weight hits 200 lbs, will call for increased diuretic plan  MAP: 80- no medication changes today  LDH: stable  Anticoagulation: Coumadin per pharmacy.  Goal 2-3.  Antiplatelet: ASA 81 mg po daily  Speed: Speed op study today: Will increase speed from 5500 to 5600  - Continue Digoxin for RV support.  Shortly after leaving clinic, he felt more SOB and dizzy. He sent an ICD check which revealed regular rhythm but tachycardic. No arrhythmia. Returned to clinic and reduced speed back to 5500. [place] better within 10 minutes. After return to 5500 rpm he felt \"10/10\". We will leave his speed here. Given that he did not tolerate higher speed, he may need increased diuretics. He zeus lcall us if weight hits 200 and we will increase lasix.    VAD interrogation today: VAD interrogation reviewed with VAD coordinator. Agree with findings. Rare PI events, no power spikes, speed drops, or other findings suspicious of pump malfunction noted.     HTN. Map controlled today at 80. Will consider transitioning from hydralazine to higher lisinopril post-covid to avoid fequent need for labs  - Continue Lisinopril   - Continue hydralazine to three times a day (was only taking twice)    New " Onset Afib s/p DCCV/history of Aflutter. ECG consistent with Afib w/ RVR and aberrancy vs. NATHALIA vs. AT vs. Slow VT. S/p DCCV on 2/28 back into sinus rhythm.  - Coumadin as above.   - Continue Digoxin.     Retrosternal Hematoma. CT chest 2/26: measuring 5.1 cm. S/P chest tube per CVTS 3/5, d/c'ed 3/15. Chest X-ray 3/18 with stable fluid to right interlobular fissure, Hgb stable.   - Follow with [facility]    Follow up   - Clinic in one month      BIJAL Padron TAMAS      Please do not hesitate to contact me if you have any questions/concerns.     Sincerely,     Mervat Decker PA-C

## 2020-04-16 LAB
MDC_IDC_EPISODE_DTM: NORMAL
MDC_IDC_EPISODE_DURATION: 1320 S
MDC_IDC_EPISODE_DURATION: 1920 S
MDC_IDC_EPISODE_DURATION: 360 S
MDC_IDC_EPISODE_DURATION: 480 S
MDC_IDC_EPISODE_DURATION: 600 S
MDC_IDC_EPISODE_DURATION: 840 S
MDC_IDC_EPISODE_DURATION: 960 S
MDC_IDC_EPISODE_DURATION: 960 S
MDC_IDC_EPISODE_ID: 100
MDC_IDC_EPISODE_ID: 101
MDC_IDC_EPISODE_ID: 102
MDC_IDC_EPISODE_ID: 103
MDC_IDC_EPISODE_ID: 104
MDC_IDC_EPISODE_ID: 105
MDC_IDC_EPISODE_ID: 106
MDC_IDC_EPISODE_ID: 107
MDC_IDC_EPISODE_ID: 108
MDC_IDC_EPISODE_ID: 109
MDC_IDC_EPISODE_ID: 110
MDC_IDC_EPISODE_ID: 111
MDC_IDC_EPISODE_ID: 112
MDC_IDC_EPISODE_ID: 86
MDC_IDC_EPISODE_ID: 93
MDC_IDC_EPISODE_ID: 94
MDC_IDC_EPISODE_ID: 95
MDC_IDC_EPISODE_ID: 96
MDC_IDC_EPISODE_ID: 97
MDC_IDC_EPISODE_ID: 98
MDC_IDC_EPISODE_ID: 99
MDC_IDC_EPISODE_TYPE: NORMAL
MDC_IDC_LEAD_IMPLANT_DT: NORMAL
MDC_IDC_LEAD_LOCATION: NORMAL
MDC_IDC_LEAD_LOCATION_DETAIL_1: NORMAL
MDC_IDC_LEAD_MFG: NORMAL
MDC_IDC_LEAD_MODEL: NORMAL
MDC_IDC_LEAD_POLARITY_TYPE: NORMAL
MDC_IDC_LEAD_SERIAL: NORMAL
MDC_IDC_LEAD_SPECIAL_FUNCTION: NORMAL
MDC_IDC_MSMT_BATTERY_DTM: NORMAL
MDC_IDC_MSMT_BATTERY_REMAINING_LONGEVITY: 137 MO
MDC_IDC_MSMT_BATTERY_RRT_TRIGGER: 2.73
MDC_IDC_MSMT_BATTERY_STATUS: NORMAL
MDC_IDC_MSMT_BATTERY_VOLTAGE: 3.16 V
MDC_IDC_MSMT_LEADCHNL_RV_IMPEDANCE_VALUE: 361 OHM
MDC_IDC_MSMT_LEADCHNL_RV_IMPEDANCE_VALUE: 475 OHM
MDC_IDC_MSMT_LEADCHNL_RV_PACING_THRESHOLD_AMPLITUDE: 0.5 V
MDC_IDC_MSMT_LEADCHNL_RV_PACING_THRESHOLD_PULSEWIDTH: 0.4 MS
MDC_IDC_MSMT_LEADCHNL_RV_SENSING_INTR_AMPL: 14 MV
MDC_IDC_MSMT_LEADCHNL_RV_SENSING_INTR_AMPL: 14 MV
MDC_IDC_PG_IMPLANT_DTM: NORMAL
MDC_IDC_PG_MFG: NORMAL
MDC_IDC_PG_MODEL: NORMAL
MDC_IDC_PG_SERIAL: NORMAL
MDC_IDC_PG_TYPE: NORMAL
MDC_IDC_SESS_CLINIC_NAME: NORMAL
MDC_IDC_SESS_DTM: NORMAL
MDC_IDC_SESS_TYPE: NORMAL
MDC_IDC_SET_BRADY_HYSTRATE: NORMAL
MDC_IDC_SET_BRADY_LOWRATE: 40 {BEATS}/MIN
MDC_IDC_SET_BRADY_MODE: NORMAL
MDC_IDC_SET_LEADCHNL_RV_PACING_AMPLITUDE: 3.5 V
MDC_IDC_SET_LEADCHNL_RV_PACING_ANODE_ELECTRODE_1: NORMAL
MDC_IDC_SET_LEADCHNL_RV_PACING_ANODE_LOCATION_1: NORMAL
MDC_IDC_SET_LEADCHNL_RV_PACING_CAPTURE_MODE: NORMAL
MDC_IDC_SET_LEADCHNL_RV_PACING_CATHODE_ELECTRODE_1: NORMAL
MDC_IDC_SET_LEADCHNL_RV_PACING_CATHODE_LOCATION_1: NORMAL
MDC_IDC_SET_LEADCHNL_RV_PACING_POLARITY: NORMAL
MDC_IDC_SET_LEADCHNL_RV_PACING_PULSEWIDTH: 0.4 MS
MDC_IDC_SET_LEADCHNL_RV_SENSING_ANODE_ELECTRODE_1: NORMAL
MDC_IDC_SET_LEADCHNL_RV_SENSING_ANODE_LOCATION_1: NORMAL
MDC_IDC_SET_LEADCHNL_RV_SENSING_CATHODE_ELECTRODE_1: NORMAL
MDC_IDC_SET_LEADCHNL_RV_SENSING_CATHODE_LOCATION_1: NORMAL
MDC_IDC_SET_LEADCHNL_RV_SENSING_POLARITY: NORMAL
MDC_IDC_SET_LEADCHNL_RV_SENSING_SENSITIVITY: 0.3 MV
MDC_IDC_SET_ZONE_DETECTION_BEATS_DENOMINATOR: 40 {BEATS}
MDC_IDC_SET_ZONE_DETECTION_BEATS_NUMERATOR: 30 {BEATS}
MDC_IDC_SET_ZONE_DETECTION_INTERVAL: 270 MS
MDC_IDC_SET_ZONE_DETECTION_INTERVAL: 340 MS
MDC_IDC_SET_ZONE_DETECTION_INTERVAL: 360 MS
MDC_IDC_SET_ZONE_DETECTION_INTERVAL: NORMAL
MDC_IDC_SET_ZONE_TYPE: NORMAL
MDC_IDC_STAT_AT_BURDEN_PERCENT: 2.5 %
MDC_IDC_STAT_AT_DTM_END: NORMAL
MDC_IDC_STAT_AT_DTM_START: NORMAL
MDC_IDC_STAT_BRADY_DTM_END: NORMAL
MDC_IDC_STAT_BRADY_DTM_START: NORMAL
MDC_IDC_STAT_BRADY_RV_PERCENT_PACED: 0.01 %
MDC_IDC_STAT_EPISODE_RECENT_COUNT: 0
MDC_IDC_STAT_EPISODE_RECENT_COUNT: 38
MDC_IDC_STAT_EPISODE_RECENT_COUNT_DTM_END: NORMAL
MDC_IDC_STAT_EPISODE_RECENT_COUNT_DTM_START: NORMAL
MDC_IDC_STAT_EPISODE_TOTAL_COUNT: 0
MDC_IDC_STAT_EPISODE_TOTAL_COUNT: 112
MDC_IDC_STAT_EPISODE_TOTAL_COUNT_DTM_END: NORMAL
MDC_IDC_STAT_EPISODE_TOTAL_COUNT_DTM_START: NORMAL
MDC_IDC_STAT_EPISODE_TYPE: NORMAL
MDC_IDC_STAT_TACHYTHERAPY_ATP_DELIVERED_RECENT: 0
MDC_IDC_STAT_TACHYTHERAPY_ATP_DELIVERED_TOTAL: 0
MDC_IDC_STAT_TACHYTHERAPY_RECENT_DTM_END: NORMAL
MDC_IDC_STAT_TACHYTHERAPY_RECENT_DTM_START: NORMAL
MDC_IDC_STAT_TACHYTHERAPY_SHOCKS_ABORTED_RECENT: 0
MDC_IDC_STAT_TACHYTHERAPY_SHOCKS_ABORTED_TOTAL: 0
MDC_IDC_STAT_TACHYTHERAPY_SHOCKS_DELIVERED_RECENT: 0
MDC_IDC_STAT_TACHYTHERAPY_SHOCKS_DELIVERED_TOTAL: 0
MDC_IDC_STAT_TACHYTHERAPY_TOTAL_DTM_END: NORMAL
MDC_IDC_STAT_TACHYTHERAPY_TOTAL_DTM_START: NORMAL

## 2020-04-21 ENCOUNTER — ANTICOAGULATION THERAPY VISIT (OUTPATIENT)
Dept: ANTICOAGULATION | Facility: CLINIC | Age: 65
End: 2020-04-21

## 2020-04-21 ENCOUNTER — CARE COORDINATION (OUTPATIENT)
Dept: CARDIOLOGY | Facility: CLINIC | Age: 65
End: 2020-04-21

## 2020-04-21 DIAGNOSIS — Z95.811 LVAD (LEFT VENTRICULAR ASSIST DEVICE) PRESENT (H): ICD-10-CM

## 2020-04-21 LAB — INR PPP: 2.5 (ref 0.9–1.1)

## 2020-04-21 NOTE — PROGRESS NOTES
4/21/2020:  Standing INR order signed by Dr. Tomas was faxed to [facility].  Krysta Mcdaniel RN

## 2020-04-21 NOTE — PROGRESS NOTES
Called patient & caregiver to check in 4 weeks post discharge. Pt reports VAD parameters close to baseline and weight mostly stable (198/199). Reviewed medications and answered any questions. Patient reports sleeping well and no anxiety since being home with LVAD. Patient is able to move around the house and care for himself independently.     Discussed specific new problems/stressors since being discharged from the hospital: none.  Empathized with patient and reviewed coping strategies: enlisting support from friends and love ones, attending patient and caregiver support groups, reviewing LVAD educational materials to reinforce knowledge, and talking about concerns with family/care providers/trusted others. Encouraged pt to page VAD Coordinator with any issues or questions. Pt verbalizes understanding.

## 2020-04-21 NOTE — PROGRESS NOTES
ANTICOAGULATION FOLLOW-UP CLINIC VISIT    Patient Name:  Eliseo Tanner  Date:  2020  Contact Type:  Telephone    SUBJECTIVE:  Patient Findings     Comments:   Spoke with spouse, Veronique.  She reports Eliseo has not had changes in health, diet, medications.  No missed doses of warfarin.        Clinical Outcomes     Comments:   Spoke with spouse, Veronique.  She reports Eliseo has not had changes in health, diet, medications.  No missed doses of warfarin.           OBJECTIVE    INR   Date Value Ref Range Status   2020 2.5 (A) 0.90 - 1.10 Final     Chromogenic Factor 10   Date Value Ref Range Status   2020 27 (L) 70 - 130 % Final     Comment:     Therapeutic Range:  A Chromogenic Factor 10 level of approximately 20-40%   inversely correlates with an INR of 2-3 for patients receiving Warfarin.   Chromogenic Factor 10 levels below 20% indicate an INR greater than 3 and   levels above 40% indicate an INR less than 2.         ASSESSMENT / PLAN  INR assessment THER    Recheck INR In: 1 WEEK    INR Location Outside lab      Anticoagulation Summary  As of 2020    INR goal:   2.0-3.0   TTR:   44.9 % (3.7 wk)   INR used for dosin.5 (2020)   Warfarin maintenance plan:   4 mg (4 mg x 1) every day   Full warfarin instructions:   4 mg every day   Weekly warfarin total:   28 mg   Plan last modified:   Krysta Mcdaniel RN (2020)   Next INR check:   2020   Priority:   Critical   Target end date:   Indefinite    Indications    LVAD (left ventricular assist device) present (H) [Z95.811]             Anticoagulation Episode Summary     INR check location:       Preferred lab:       [facility] INR reminders to:   [facility]    Comments:   Patient is staying at [facility].  Patient on Amiodarone  HM 3  placed 2020, Speak to Veronique ramírez at [phone]  2020:  Lab: [facility]:  ph: [phone] opt 5;   fax: [phone]      Anticoagulation Care Providers     Provider Role  Specialty Phone number    Gucci Tomas MD Referring Cardiology [phone]            See the Encounter Report to view Anticoagulation Flowsheet and Dosing Calendar (Go to Encounters tab in chart review, and find the Anticoagulation Therapy Visit)    Spoke with spouse, Veronique.    Patient had LVAD placed on:   2/18/2020  Type of LVAD: HM 3  Patient's current Aspirin dose: 81 mg daily  LVAD Protocol followed: Yes     Krysta Mcdaniel RN

## 2020-04-28 ENCOUNTER — ANTICOAGULATION THERAPY VISIT (OUTPATIENT)
Dept: ANTICOAGULATION | Facility: CLINIC | Age: 65
End: 2020-04-28

## 2020-04-28 DIAGNOSIS — Z95.811 LVAD (LEFT VENTRICULAR ASSIST DEVICE) PRESENT (H): ICD-10-CM

## 2020-04-28 LAB — INR PPP: 2.2 (ref 0.9–1.1)

## 2020-04-28 NOTE — PROGRESS NOTES
ANTICOAGULATION FOLLOW-UP CLINIC VISIT    Patient Name:  Eliseo Tanner  Date:  2020  Contact Type:  Telephone    SUBJECTIVE:         OBJECTIVE    INR   Date Value Ref Range Status   2020 2.2 (A) 0.90 - 1.10 Final     Comment:     Outside lab     Chromogenic Factor 10   Date Value Ref Range Status   2020 27 (L) 70 - 130 % Final     Comment:     Therapeutic Range:  A Chromogenic Factor 10 level of approximately 20-40%   inversely correlates with an INR of 2-3 for patients receiving Warfarin.   Chromogenic Factor 10 levels below 20% indicate an INR greater than 3 and   levels above 40% indicate an INR less than 2.         ASSESSMENT / PLAN  INR assessment THER    Recheck INR In: 1 WEEK    INR Location Outside lab      Anticoagulation Summary  As of 2020    INR goal:   2.0-3.0   TTR:   56.6 % (1.1 mo)   INR used for dosin.2 (2020)   Warfarin maintenance plan:   4 mg (4 mg x 1) every day   Full warfarin instructions:   4 mg every day   Weekly warfarin total:   28 mg   No change documented:   Young Shine RN   Plan last modified:   Krysta Mcdaniel RN (2020)   Next INR check:   2020   Priority:   Critical   Target end date:   Indefinite    Indications    LVAD (left ventricular assist device) present (H) [Z95.811]             Anticoagulation Episode Summary     INR check location:       Preferred lab:       Send INR reminders to:   [facility]    Comments:   Patient is staying at [facility].  Patient on Amiodarone  HM 3  placed 2020, Speak to spouse, Veronique at [phone]  2020:  Lab: [facility]:  ph: [phone] opt 5;   fax: [phone]      Anticoagulation Care Providers     Provider Role Specialty Phone number    Gucci Tomas MD Referring Cardiology [phone]            See the Encounter Report to view Anticoagulation Flowsheet and Dosing Calendar (Go to Encounters tab in chart review, and find the Anticoagulation Therapy  Visit)    INR/CFX/F2 RESULT: INR result is 2.2 per outside lab    ASSESSMENT:Left message for patient with results and dosing recommendations on Veronique's voicemail. Asked patient to call back to report any missed doses, falls, signs and symptoms of bleeding or clotting, any changes in health, medication, or diet. Asked patient to call back with any questions or concerns.    DOSING ADJUSTMENT: continue maintenance dose 4mg daily     NEXT INR/FACTOR X OR FACTOR II: 5/5    PROTOCOL FOLLOWED:goal LVAD 2-3  HM 3 placed 2/18/20, 81mg ASA    Young Shine RN

## 2020-05-04 ENCOUNTER — CARE COORDINATION (OUTPATIENT)
Dept: CARDIOLOGY | Facility: CLINIC | Age: 65
End: 2020-05-04

## 2020-05-04 NOTE — PROGRESS NOTES
Attempted to call pt for 6 week post-discharge check-in. He did not answer the phone. Left message asking him to call back with any changes, questions, or concerns. Will plan to check in again in 2 weeks.

## 2020-05-05 ENCOUNTER — ANTICOAGULATION THERAPY VISIT (OUTPATIENT)
Dept: ANTICOAGULATION | Facility: CLINIC | Age: 65
End: 2020-05-05

## 2020-05-05 DIAGNOSIS — Z95.811 LVAD (LEFT VENTRICULAR ASSIST DEVICE) PRESENT (H): ICD-10-CM

## 2020-05-05 LAB — INR PPP: 2.1 (ref 0.9–1.1)

## 2020-05-05 NOTE — PROGRESS NOTES
ANTICOAGULATION FOLLOW-UP CLINIC VISIT    Patient Name:  Eliseo Tanner  Date:  2020  Contact Type:  Telephone    SUBJECTIVE:  Patient Findings     Comments:   Spoke to patient's spouse, Veronique.  No Problems found. No Changes in Health, Medications, or diet. No Signs of bruising, bleeding or clotting.        Clinical Outcomes     Comments:   Spoke to patient's spouse, Veronique.  No Problems found. No Changes in Health, Medications, or diet. No Signs of bruising, bleeding or clotting.           OBJECTIVE    INR   Date Value Ref Range Status   2020 2.1 (A) 0.90 - 1.10 Final     Comment:     Outside lab results     Chromogenic Factor 10   Date Value Ref Range Status   2020 27 (L) 70 - 130 % Final     Comment:     Therapeutic Range:  A Chromogenic Factor 10 level of approximately 20-40%   inversely correlates with an INR of 2-3 for patients receiving Warfarin.   Chromogenic Factor 10 levels below 20% indicate an INR greater than 3 and   levels above 40% indicate an INR less than 2.         ASSESSMENT / PLAN  INR assessment THER    Recheck INR In: 2 WEEKS    INR Location Outside lab      Anticoagulation Summary  As of 2020    INR goal:   2.0-3.0   TTR:   64.2 % (1.3 mo)   INR used for dosin.1 (2020)   Warfarin maintenance plan:   4 mg (4 mg x 1) every day   Full warfarin instructions:   4 mg every day   Weekly warfarin total:   28 mg   No change documented:   Young Shine RN   Plan last modified:   Krysta Mcdaniel RN (2020)   Next INR check:   2020   Priority:   Critical   Target end date:   Indefinite    Indications    LVAD (left ventricular assist device) present (H) [Z95.811]             Anticoagulation Episode Summary     INR check location:       Preferred lab:       Send INR reminders to:   [facility]    Comments:   Patient on Amiodarone  HM 3  placed 2020, 81mg ASA, Speak to spouse, Veronique at [phone]  2020:  Lab: [facility]:  ph:  [phone] opt 5;   fax: [phone]      Anticoagulation Care Providers     Provider Role Specialty Phone number    Gucci Tomas MD Referring Cardiology [phone]            See the Encounter Report to view Anticoagulation Flowsheet and Dosing Calendar (Go to Encounters tab in chart review, and find the Anticoagulation Therapy Visit)    INR/CFX/F2 RESULT: INR result is 2.1    ASSESSMENT:No Problems found. No Changes in Health, Medications, or diet. No Signs of bruising, bleeding or clotting.    DOSING ADJUSTMENT: 4mg daily    NEXT INR/FACTOR X OR FACTOR II:5/19    PROTOCOL FOLLOWED:LVAD 2-3  HM 3 placed 2/18/20, 81mg ASA daily    Young Shine RN

## 2020-05-07 ENCOUNTER — TELEPHONE (OUTPATIENT)
Dept: CARDIOLOGY | Facility: CLINIC | Age: 65
End: 2020-05-07

## 2020-05-07 NOTE — TELEPHONE ENCOUNTER
[facility] Call Center    Phone Message    May a detailed message be left on voicemail: yes     Reason for Call: Other: Pt wife would like a call back to discuss if she can return back to work with [employer]. PLease reach out to pt wife to discuss     Action Taken: Message routed to:  [facility] ([facility]): Cardio    Travel Screening: Not Applicable

## 2020-05-11 NOTE — TELEPHONE ENCOUNTER
Chapis was called.  Letter was written for her to return to work per Dr. Cisneros and sent to her.

## 2020-05-13 ENCOUNTER — CARE COORDINATION (OUTPATIENT)
Dept: CARDIOLOGY | Facility: CLINIC | Age: 65
End: 2020-05-13

## 2020-05-13 NOTE — PROGRESS NOTES
Called patient & caregiver to check in 8 weeks post discharge. Pt reports VAD parameters WNL.  Current LVAD numbers, Speed: 5500, Flow: 4.5, PI: 3.4, Power: 4.5 and weight 202 (stable). Reviewed medications and answered any questions. Patient reports sleeping well and no anxiety since being home with LVAD. Patient is able to move around the house and care for himself independently.     Discussed specific new problems/stressors since being discharged from the hospital.  The only concern for Eliseo is that his dressing is not sticking well to his abdomen.  A new order form was sent to [facility] with 3M Kind tape on it to see if that will stick better.  Empathized with patient and reviewed coping strategies: enlisting support from friends and love ones, reviewing LVAD educational materials to reinforce knowledge, and talking about concerns with family/care providers/trusted others. Encouraged pt to page VAD Coordinator with any issues or questions. Pt verbalizes understanding.

## 2020-05-15 DIAGNOSIS — Z95.811 LVAD (LEFT VENTRICULAR ASSIST DEVICE) PRESENT (H): ICD-10-CM

## 2020-05-15 RX ORDER — PANTOPRAZOLE SODIUM 40 MG/1
40 TABLET, DELAYED RELEASE ORAL
Qty: 30 TABLET | Refills: 0 | Status: ON HOLD | OUTPATIENT
Start: 2020-05-15 | End: 2020-11-15

## 2020-05-15 NOTE — TELEPHONE ENCOUNTER
pantoprazole (PROTONIX) 40 MG EC tablet    Last Written Prescription Date:  4/14/20  Last Fill Quantity: 30,   # refills: 0  Last Office Visit : 4/14/20  Future Office visit:  5/19/20      Routing refill request to provider for review/approval because:  Not on protocol

## 2020-05-18 ENCOUNTER — ANTICOAGULATION THERAPY VISIT (OUTPATIENT)
Dept: ANTICOAGULATION | Facility: CLINIC | Age: 65
End: 2020-05-18

## 2020-05-18 DIAGNOSIS — Z95.811 LVAD (LEFT VENTRICULAR ASSIST DEVICE) PRESENT (H): ICD-10-CM

## 2020-05-18 LAB
ALBUMIN SERPL-MCNC: 4.1 G/DL
ALP SERPL-CCNC: 98 U/L
ALT SERPL-CCNC: 33 U/L
ANION GAP SERPL CALCULATED.3IONS-SCNC: 8 MMOL/L
AST SERPL-CCNC: 28 U/L
BASOPHILS NFR BLD AUTO: 0.6 %
BILIRUB SERPL-MCNC: 0.4 MG/DL
BUN SERPL-MCNC: 34 MG/DL
CALCIUM SERPL-MCNC: 9.2 MG/DL
CHLORIDE SERPLBLD-SCNC: 103 MMOL/L
CO2 SERPL-SCNC: 24 MMOL/L
CREAT SERPL-MCNC: 1.7 MG/DL
D DIMER PPP FEU-MCNC: 3220 UG/ML(FEU) (ref 0–500)
EOSINOPHIL NFR BLD AUTO: 5 %
ERYTHROCYTE [DISTWIDTH] IN BLOOD BY AUTOMATED COUNT: 16.2 %
GFR SERPL CREATININE-BSD FRML MDRD: 43 ML/MIN/1.73M2
GLUCOSE SERPL-MCNC: 196 MG/DL (ref 70–99)
HCT VFR BLD AUTO: 34.5 %
HEMOGLOBIN: 10.4 G/DL (ref 13.3–17.7)
INR PPP: 2.3 (ref 0.9–1.1)
LDH SERPL-CCNC: 275 U/L
LYMPHOCYTES NFR BLD AUTO: 18.6 %
MCH RBC QN AUTO: 22.7 PG
MCHC RBC AUTO-ENTMCNC: 30 G/DL
MCV RBC AUTO: 76 FL
MONOCYTES NFR BLD AUTO: 11.2 %
NEUTROPHILS NFR BLD AUTO: 64.6 %
PLATELET COUNT - QUEST: 395 10^9/L (ref 150–450)
POTASSIUM SERPL-SCNC: 4.1 MMOL/L
PROT SERPL-MCNC: 7.9 G/DL
RBC # BLD AUTO: 4.57 10^12/L
SODIUM SERPL-SCNC: 135 MMOL/L
WBC # BLD AUTO: 8.6 10^9/L

## 2020-05-18 NOTE — PROGRESS NOTES
Marlen  Duration: 20 minutes    HPI:   Eliseo Tanner is a 64 year old male with chronic systolic heart failure secondary to NICM now s/p HM 3 on 2/18, moderate CAD, HTN, ABHINAV on CPAP, DM2, CKD Stage III, ANA. His HM3 post-op course was complicated by retrosternal hematoma and bleeding in the lungs, RV failure,VT in ICU now on amiodarone and Afib w/AVR S/p DCCV on 2/28. He had pre-op proteus and enterococcus bacteremia from 2/13, s/p abx. He had an ICD placed on 3/16/2020. He presents today for an inperson visit for LVAD follow-up.    Last visit 4/14/2020 with Mervat Decker  Attempted speed increase, did not feel well with this change so came back to baseline speed.    This visit  His weight is up a bit from his last visit. Has been a slow and gradual gain. Last visit was 193 lbs. Now he is ranging 202-206. Appetite has improved. He feels MIRANDA after 3-4 blocks, which is improving. He has not done cardiac rehab yet d/t COVID. No LE edema, abdominal edema, orthopnea or PND. No SOB at rest. He does have occasional dizziness with quick position changes. No palpitations, chest pain, or loss of consciousness.     No blood in the urine, blood in the stool or prolonged nosebleeds.    He still has some clear/crusty drainage around the driveline. Has been ongoing since implant. No purulence. Still mild redness, again, unchanged since implant. No tenderness.    He gets occasional slight headaches. Not new. No stroke symptoms.    He does not check blood pressure at home.    No dark urine. No Brusing. No SOB at rest. No pump alarms.    Cardiac Medications  ASA 81 mg daily  Coumadin  Amiodarone 200 mg daily- for post-op VT s/p external shock  Atorvastatin 20 mg daily  Digoxin 125 mcg daily  Lasix 40 mg daily   Kcl 20 meq daily  Hydralazine 100 mg TID  Lisinopril 10 mg daily    PAST MEDICAL HISTORY:  No past medical history on file.    FAMILY HISTORY:  No family history on file.    SOCIAL HISTORY:  Social History      Socioeconomic History     Marital status:      Spouse name: Not on file     Number of children: Not on file     Years of education: Not on file     Highest education level: Not on file   Occupational History     Not on file   Social Needs     Financial resource strain: Not on file     Food insecurity     Worry: Not on file     Inability: Not on file     Transportation needs     Medical: Not on file     Non-medical: Not on file   Tobacco Use     Smoking status: Not on file   Substance and Sexual Activity     Alcohol use: Not on file     Drug use: Not on file     Sexual activity: Not on file   Lifestyle     Physical activity     Days per week: Not on file     Minutes per session: Not on file     Stress: Not on file   Relationships     Social connections     Talks on phone: Not on file     Gets together: Not on file     Attends Yazidi service: Not on file     Active member of club or organization: Not on file     Attends meetings of clubs or organizations: Not on file     Relationship status: Not on file     Intimate partner violence     Fear of current or ex partner: Not on file     Emotionally abused: Not on file     Physically abused: Not on file     Forced sexual activity: Not on file   Other Topics Concern     Not on file   Social History Narrative     Not on file       CURRENT MEDICATIONS:  acetaminophen (TYLENOL) 325 MG tablet, Take 2 tablets (650 mg) by mouth every 4 hours as needed for mild pain or fever  allopurinol (ZYLOPRIM) 100 MG tablet, 2 tablets (200 mg) by Oral or Feeding Tube route daily  amiodarone (PACERONE) 200 MG tablet, Take 1 tablet (200 mg) by mouth daily  aspirin (ASA) 81 MG EC tablet, Take 1 tablet (81 mg) by mouth daily  atorvastatin (LIPITOR) 20 MG tablet, 1 tablet (20 mg) by Oral or Feeding Tube route every evening  co-enzyme Q-10 200 MG CAPS, 200 mg by Oral or Feeding Tube route daily  diclofenac (VOLTAREN) 1 % topical gel, Apply 4 g topically 4 times daily as needed for  moderate pain  digoxin (LANOXIN) 125 MCG tablet, Take 1 tablet (125 mcg) by mouth daily  FLUoxetine (PROZAC) 20 MG capsule, Take 1 capsule (20 mg) by mouth daily  hydrALAZINE (APRESOLINE) 100 MG tablet, Take 1 tablet (100 mg) by mouth 3 times daily  insulin glargine (LANTUS PEN) 100 UNIT/ML pen, Inject 10 Units Subcutaneous At Bedtime  insulin pen needle (BD JAIME U/F) 32G X 4 MM miscellaneous,   lisinopril (ZESTRIL) 10 MG tablet, Take 1 tablet (10 mg) by mouth daily  magnesium oxide (MAG-OX) 400 MG tablet, 1 tablet (400 mg) by Oral or Feeding Tube route daily  methocarbamol (ROBAXIN) 500 MG tablet, Take 1 tablet (500 mg) by mouth 4 times daily as needed for muscle spasms  MULTIPLE MINERALS-VITAMINS PO, Take 1 tablet by mouth daily  multivitamin w/minerals (THERA-VIT-M) tablet, Take 1 tablet by mouth daily  ondansetron (ZOFRAN) 4 MG tablet, Take 4 mg by mouth every 4 hours as needed for nausea  pantoprazole (PROTONIX) 40 MG EC tablet, Take 1 tablet (40 mg) by mouth every morning (before breakfast)  potassium chloride ER (KLOR-CON M) 20 MEQ CR tablet, Take 1 tablet (20 mEq) by mouth daily  warfarin ANTICOAGULANT (COUMADIN) 4 MG tablet, Take 4-8mg daily or as directed by the coumadin clinic  albuterol (PROAIR HFA/PROVENTIL HFA/VENTOLIN HFA) 108 (90 Base) MCG/ACT inhaler, Inhale 2 puffs into the lungs every 6 hours as needed for wheezing (Patient not taking: Reported on 2020)  [] cephALEXin (KEFLEX) 500 MG capsule, Take 1 capsule (500 mg) by mouth 3 times daily for 5 days  [] cephalexin 500 MG tablet, Take 500 mg by mouth 3 times daily for 5 days    No current facility-administered medications on file prior to visit.       ROS:   CONSTITUTIONAL: Denies fever, chills, fatigue.  HEENT: Denies vision changes, and changes in speech.   CV: Refer to HPI.   PULMONARY:Refer to HPI.   GI:Denies nausea, vomiting, diarrhea, and abdominal pain. Bowel movements are regular.   :Denies urinary alterations,  "dysuria, urinary frequency, hematuria, and abnormal drainage.   EXT:See HPI  SKIN:Denies abnormal rashes or lesions.   MUSCULOSKELETAL:Denies upper or lower extremity weakness and pain.   NEUROLOGIC:Denies seizures, or upper or lower extremity paresthesia.     EXAM:  Ht 1.702 m (5' 7\")   Wt 93 kg (205 lb)   BMI 32.11 kg/m      Constitutional -  relatively well-appearing man, no pallor, energy level is good, no obvious pain.  Posture normal.      Eyes - No redness, anicteric      Respiratory - calm, regular breathing, normal respiratory effort, no cough noted      Skin - no pallor, no rash noted on face      Psychiatric - calm affect, and interacting appropriately    The rest of a comprehensive physical examination is deferred due to PHE (public health emergency) video visit restrictions      Labs - as reviewed in clinic with patient today:  CBC RESULTS:  Lab Results   Component Value Date    WBC 8.6 05/18/2020    RBC 4.57 05/18/2020    HGB 10.4 (A) 05/18/2020    HCT 34.5 05/18/2020    MCV 76.0 05/18/2020    MCH 22.7 05/18/2020    MCHC 30 05/18/2020    RDW 16.2 05/18/2020     05/18/2020     (H) 04/14/2020       CMP RESULTS:  Lab Results   Component Value Date     05/18/2020    POTASSIUM 4.1 05/18/2020    CHLORIDE 103 05/18/2020    CO2 24 05/18/2020    ANIONGAP 8 05/18/2020     (A) 05/18/2020    BUN 34 05/18/2020    CR 1.7 05/18/2020    GFRESTIMATED 43 05/18/2020    GFRESTBLACK 66 04/14/2020    CALOS 9.2 05/18/2020    BILITOTAL 0.4 05/18/2020    ALBUMIN 4.1 05/18/2020    ALKPHOS 98 05/18/2020    ALT 33 05/18/2020    AST 28 05/18/2020        INR RESULTS:  Lab Results   Component Value Date    INR 2.3 (A) 05/18/2020       Lab Results   Component Value Date    MAG 1.9 04/08/2020     Lab Results   Component Value Date    NTBNPI 12,559 (H) 02/08/2020     No results found for: NTBNP    Diagnostics    ICD Check 4/14/2020  [employer]tr[employer], single lead ICD,  remote transmission received and reviewed. " Device transmission sent per MD orders. In clinic device appointment conversion to remote device appointment to minimize COVID19 exposure for high risk patient populations. Pt reports he his the speed of his LVAD changed today and he hasn't felt well since then. His presenting rhythm is a regular ventricular rhythm @ 125 bpm. The morphology is similar to his previous intrinsic tracings. No arrhythmias recorded. Normal device function.  <0.1%. No short V-V intervals recorded. Lead trends appear stable. OptiVol thoracic impedance is establishing his baseline. Battery estimates 11.4 years to PERLA. Pt and Karolina Decker PA-C, notified of transmission results.  KRISTEN Dumont.     Remote ICD transmission.    CXR 3/9/2020:  1.  Stable supportive lines and tubes.  2.  Cardiomegaly with LVAD placement, stable.  3.  Right apical hemothorax evacuation. Right mid/lower lobe  subsegmental atelectasis, overall stable.     CT Chest 3/4/2020:  1. Grossly stable large right apical hemothorax compared to recent  radiographs. Tiny nondisplaced fractures of the medial right first and  second ribs. Tiny right pneumothorax.   2. Intrafissural position of the right basilar chest tube abutting the  pericardium and with the tip immediately inferior to the right hilar  vasculature.   3. Small left pleural effusion, which also contains hyperdense fluid  suspicious for small component of left-sided hemothorax.  4. Stable retrosternal hematoma.  5. Cardiomegaly with small pericardial effusion versus  hemopericardium. Stable LVAD and pericardial drain.     Echo 2/25/2020:  Left ventricular size is normal. Severely reduced left ventricular systolic  function. Septum is midline  LVAD inflow or outflow cannulae not visualized. Doppler velocities are not  elevated.  Moderate to severely reduced right ventricular systolic function.  Aortic valve appears closed during the entire cardiac cycle. Trace aortic  insufficiency is present.  IVC is  plethoric.  No pericardial effusion present.    Assessment and Plan:   Eliseo Tanner is a 64 year old male with chronic systolic heart failure secondary to NICM now s/p HM 3 on 2/18, moderate CAD, HTN, ABHINAV on CPAP, DM2, CKD Stage III, ANA. His HM3 post-op course was complicated by retrosternal hematoma, RV failure,VT in ICU now on amiodarone and Afib w/AVR S/p DCCV on 2/28. He had pre-op proteus and enterococcus bacteremia from 2/13, s/p abx. He had an ICD placed on 3/16/2020.  He presents today for routine clinic visit via video given ongoing COVID 19 outbreak.    He has been feeling well. Gaining some gradual weight, but really not heart failure symptoms with this. Likely calories now that he is eating better, but we will trial a short increase of lasix to see if he gets any benefit.    He also has an elevated D-dimer. He has switched labs. New value is 6.5 times the upper limit of normal for that lab's assay. Please note that the value was manually input into our system and does not allign with the correct units. Actual value is 3220 ng/DL. His last D-dimer here was 11 the upper limit of normal for [facility]'s lab's assay. I have a low suspicion for PE, no SOB at rest or signs of DVT. I have low suspsion for clot thrombosis- no alarms, changes to his parameters, or heart failure symptoms, or dark urine.  We will recheck another value on Friday to confirm his BL at the new lab.    Chronic SCHF secondary to NICM s/p HM III with RV dysfunction. Implanted 2/18 and complicated by bleeding.   Stage D, NYHA Class IIIA  ACEi/ARB Lisinopril 10 mg po daily. Continue Hydralazine 100 mg po TID.   BB Defer s/p recent LVAD w/right heart failure on digoxin  Aldosterone antagonist deferred while other medical therapy is prioritized  SCD prophylaxis ICD implanted 3/16.  Fluid status Hypervolemic: Increase lasix to 40/20 x4 days, increase Kcl to 20 meq every other day alternating with 40 meq every other day. Then resume normal  dosing.  MAP: Goal 65-85  LDH: stable at 275  Anticoagulation: Coumadin per pharmacy.  Goal 2-3.  Antiplatelet: ASA 81 mg po daily  Speed: Did not tolerate a speed increase to 5600 on a prior attempt  RV support: Continue Digoxin    AoCKD  Cr up to 1.7 from BL of 1.3. Suspect d/t increased volume  - Recheck BMP on Friday after increased diuretics.    VAD interrogation today: N/A video visit, but no alarms per patient  HTN.  Will consider transitioning from hydralazine to higher lisinopril post-covid to avoid fequent need for labs  - Continue Lisinopril   - Continue hydralazine     New Onset Afib s/p DCCV/history of Aflutter. ECG consistent with Afib w/ RVR and aberrancy vs. [facility] vs. AT vs. Slow VT. S/p DCCV on 2/28 back into sinus rhythm.  - Coumadin as above.   - Continue Digoxin.     Retrosternal Hematoma. CT chest 2/26: measuring 5.1 cm. S/P chest tube per CVTS 3/5, d/c'ed 3/15. Chest X-ray 3/18 with stable fluid to right interlobular fissure, Hgb stable.   - Follow with CV[facility]    Follow up   - BMP and D-dimer on Friday  - Clinic in one month      BIJAL Padron TAMAS

## 2020-05-18 NOTE — PROGRESS NOTES
Addendum 20  Patient is scheduled for other lab work on 6/3 and patient is requesting to change the INR date. [facility]              ANTICOAGULATION FOLLOW-UP CLINIC VISIT    Patient Name:  Eliseo Tanner  Date:  2020  Contact Type:  Telephone    SUBJECTIVE:  Patient Findings         Clinical Outcomes     Negatives:   Major bleeding event, Thromboembolic event, Anticoagulation-related hospital admission, Anticoagulation-related ED visit, Anticoagulation-related fatality           OBJECTIVE    Recent labs: (last 7 days)     20   INR 2.3*       ASSESSMENT / PLAN  INR assessment THER    Recheck INR In: 2 WEEKS    INR Location Outside lab      Anticoagulation Summary  As of 2020    INR goal:   2.0-3.0   TTR:   73.0 % (1.8 mo)   INR used for dosin.3 (2020)   Warfarin maintenance plan:   4 mg (4 mg x 1) every day   Full warfarin instructions:   4 mg every day   Weekly warfarin total:   28 mg   No change documented:   Luiza Perkins RN   Plan last modified:   Krysta Mcdaniel RN (2020)   Next INR check:   2020   Priority:   Critical   Target end date:   Indefinite    Indications    LVAD (left ventricular assist device) present (H) [Z95.811]             Anticoagulation Episode Summary     INR check location:       Preferred lab:       Send INR reminders to:   [facility]    Comments:   Patient on Amiodarone  HM 3  placed 2020, 81mg ASA, Speak to spouse, Veronique at [phone]  2020:  Lab: [facility]:  ph: [phone] opt 5;   fax: [phone]      Anticoagulation Care Providers     Provider Role Specialty Phone number    Gucci Tomas MD Referring Cardiology [phone]            See the Encounter Report to view Anticoagulation Flowsheet and Dosing Calendar (Go to Encounters tab in chart review, and find the Anticoagulation Therapy Visit)    Spoke with patient's spouse Veronique. Gave them lab results and new warfarin recommendation.  No changes in  health, medication, or diet. No missed doses, no falls. No signs or symptoms of bleed or clotting.     Patient had LVAD placed on:   2/18/20  Type of LVAD: HM 3  Patient's current Aspirin dose: ASA 81mg Daily  LVAD Protocol followed: Yes   If Not Followed Explanation:  N/A    Luiza Perkins RN

## 2020-05-19 ENCOUNTER — EXTERNAL ORDER RESULTS (OUTPATIENT)
Dept: CARDIOLOGY | Facility: CLINIC | Age: 65
End: 2020-05-19

## 2020-05-19 ENCOUNTER — VIRTUAL VISIT (OUTPATIENT)
Dept: CARDIOLOGY | Facility: CLINIC | Age: 65
End: 2020-05-19
Attending: INTERNAL MEDICINE
Payer: MEDICARE

## 2020-05-19 VITALS — HEIGHT: 67 IN | WEIGHT: 205 LBS | BODY MASS INDEX: 32.18 KG/M2

## 2020-05-19 DIAGNOSIS — Z95.811 LVAD (LEFT VENTRICULAR ASSIST DEVICE) PRESENT (H): Primary | ICD-10-CM

## 2020-05-19 PROCEDURE — 99214 OFFICE O/P EST MOD 30 MIN: CPT | Mod: 95 | Performed by: PHYSICIAN ASSISTANT

## 2020-05-19 ASSESSMENT — MIFFLIN-ST. JEOR: SCORE: 1668.5

## 2020-05-19 ASSESSMENT — PAIN SCALES - GENERAL: PAINLEVEL: NO PAIN (0)

## 2020-05-19 NOTE — PROGRESS NOTES
"Eliseo Tanner is a 64 year old male who is being evaluated via a billable video visit.      The patient has been notified of following:     \"This video visit will be conducted via a call between you and your physician/provider. We have found that certain health care needs can be provided without the need for an in-person physical exam.  This service lets us provide the care you need with a video conversation.  If a prescription is necessary we can send it directly to your pharmacy.  If lab work is needed we can place an order for that and you can then stop by our lab to have the test done at a later time.    Video visits are billed at different rates depending on your insurance coverage.  Please reach out to your insurance provider with any questions.    If during the course of the call the physician/provider feels a video visit is not appropriate, you will not be charged for this service.\"    Patient has given verbal consent for Video visit? Yes    How would you like to obtain your AVS? Mail a copy    Patient would like the video invitation sent by: Text to cell phone: [phone]    Will anyone else be joining your video visit? Yes: Wife. How would they like to receive their invitation? Text to cell phone: [phone]      Weight and height were given by the patient and medications were reconciled.     Lauren Grayson CMA    1:11 PM                  "

## 2020-05-19 NOTE — PATIENT INSTRUCTIONS
Medication changes:  - Increase your lasix to 40 mg in the morning and 20 mg in the afternoon for the next four days  - For the next four days take 20 meq of Kcl every other day, alternating with 40 meq every other day  - Please send us a picture of your driveline    Follow-up  - BMP on Friday  - LVAD clinic in one month

## 2020-05-19 NOTE — LETTER
5/19/2020      RE: Eliseo Tanner  [address] [facility] [address]       Dear Colleague,    Thank you for the opportunity to participate in the care of your patient, Eliseo Tanner, at the [facility] HE[facility]. Please see a copy of my visit note below.    Amwell  Duration: 20 minutes    HPI:   Eliseo Tanner is a 64 year old male with chronic systolic heart failure secondary to NICM now s/p HM 3 on 2/18, moderate CAD, HTN, ABHINAV on CPAP, DM2, CKD Stage III, ANA. His HM3 post-op course was complicated by retrosternal hematoma and bleeding in the lungs, RV failure,VT in ICU now on amiodarone and Afib w/AVR S/p DCCV on 2/28. He had pre-op proteus and enterococcus bacteremia from 2/13, s/p abx. He had an ICD placed on 3/16/2020. He presents today for an inperson visit for LVAD follow-up.    Last visit 4/14/2020 with Mervat Decker  Attempted speed increase, did not feel well with this change so came back to baseline speed.    This visit  His weight is up a bit from his last visit. Has been a slow and gradual gain. Last visit was 193 lbs. Now he is ranging 202-206. Appetite has improved. He feels MIRANDA after 3-4 blocks, which is improving. He has not done cardiac rehab yet d/t COVID. No LE edema, abdominal edema, orthopnea or PND. No SOB at rest. He does have occasional dizziness with quick position changes. No palpitations, chest pain, or loss of consciousness.     No blood in the urine, blood in the stool or prolonged nosebleeds.    He still has some clear/crusty drainage around the driveline. Has been ongoing since implant. No purulence. Still mild redness, again, unchanged since implant. No tenderness.    He gets occasional slight headaches. Not new. No stroke symptoms.    He does not check blood pressure at home.    No dark urine. No Brusing. No SOB at rest. No pump alarms.    Cardiac Medications  ASA 81 mg daily  Coumadin  Amiodarone 200 mg daily-  for post-op VT s/p external shock  Atorvastatin 20 mg daily  Digoxin 125 mcg daily  Lasix 40 mg daily   Kcl 20 meq daily  Hydralazine 100 mg TID  Lisinopril 10 mg daily    PAST MEDICAL HISTORY:  No past medical history on file.    FAMILY HISTORY:  No family history on file.    SOCIAL HISTORY:  Social History     Socioeconomic History     Marital status:      Spouse name: Not on file     Number of children: Not on file     Years of education: Not on file     Highest education level: Not on file   Occupational History     Not on file   Social Needs     Financial resource strain: Not on file     Food insecurity     Worry: Not on file     Inability: Not on file     Transportation needs     Medical: Not on file     Non-medical: Not on file   Tobacco Use     Smoking status: Not on file   Substance and Sexual Activity     Alcohol use: Not on file     Drug use: Not on file     Sexual activity: Not on file   Lifestyle     Physical activity     Days per week: Not on file     Minutes per session: Not on file     Stress: Not on file   Relationships     Social connections     Talks on phone: Not on file     Gets together: Not on file     Attends Oriental orthodox service: Not on file     Active member of club or organization: Not on file     Attends meetings of clubs or organizations: Not on file     Relationship status: Not on file     Intimate partner violence     Fear of current or ex partner: Not on file     Emotionally abused: Not on file     Physically abused: Not on file     Forced sexual activity: Not on file   Other Topics Concern     Not on file   Social History Narrative     Not on file       CURRENT MEDICATIONS:  acetaminophen (TYLENOL) 325 MG tablet, Take 2 tablets (650 mg) by mouth every 4 hours as needed for mild pain or fever  allopurinol (ZYLOPRIM) 100 MG tablet, 2 tablets (200 mg) by Oral or Feeding Tube route daily  amiodarone (PACERONE) 200 MG tablet, Take 1 tablet (200 mg) by mouth daily  aspirin (ASA) 81 MG  EC tablet, Take 1 tablet (81 mg) by mouth daily  atorvastatin (LIPITOR) 20 MG tablet, 1 tablet (20 mg) by Oral or Feeding Tube route every evening  co-enzyme Q-10 200 MG CAPS, 200 mg by Oral or Feeding Tube route daily  diclofenac (VOLTAREN) 1 % topical gel, Apply 4 g topically 4 times daily as needed for moderate pain  digoxin (LANOXIN) 125 MCG tablet, Take 1 tablet (125 mcg) by mouth daily  FLUoxetine (PROZAC) 20 MG capsule, Take 1 capsule (20 mg) by mouth daily  hydrALAZINE (APRESOLINE) 100 MG tablet, Take 1 tablet (100 mg) by mouth 3 times daily  insulin glargine (LANTUS PEN) 100 UNIT/ML pen, Inject 10 Units Subcutaneous At Bedtime  insulin pen needle (BD JAIME U/F) 32G X 4 MM miscellaneous,   lisinopril (ZESTRIL) 10 MG tablet, Take 1 tablet (10 mg) by mouth daily  magnesium oxide (MAG-OX) 400 MG tablet, 1 tablet (400 mg) by Oral or Feeding Tube route daily  methocarbamol (ROBAXIN) 500 MG tablet, Take 1 tablet (500 mg) by mouth 4 times daily as needed for muscle spasms  MULTIPLE MINERALS-VITAMINS PO, Take 1 tablet by mouth daily  multivitamin w/minerals (THERA-VIT-M) tablet, Take 1 tablet by mouth daily  ondansetron (ZOFRAN) 4 MG tablet, Take 4 mg by mouth every 4 hours as needed for nausea  pantoprazole (PROTONIX) 40 MG EC tablet, Take 1 tablet (40 mg) by mouth every morning (before breakfast)  potassium chloride ER (KLOR-CON M) 20 MEQ CR tablet, Take 1 tablet (20 mEq) by mouth daily  warfarin ANTICOAGULANT (COUMADIN) 4 MG tablet, Take 4-8mg daily or as directed by the coumadin clinic  albuterol (PROAIR HFA/PROVENTIL HFA/VENTOLIN HFA) 108 (90 Base) MCG/ACT inhaler, Inhale 2 puffs into the lungs every 6 hours as needed for wheezing (Patient not taking: Reported on 2020)  [] cephALEXin (KEFLEX) 500 MG capsule, Take 1 capsule (500 mg) by mouth 3 times daily for 5 days  [] cephalexin 500 MG tablet, Take 500 mg by mouth 3 times daily for 5 days    No current facility-administered medications on  "file prior to visit.       ROS:   CONSTITUTIONAL: Denies fever, chills, fatigue.  HEENT: Denies vision changes, and changes in speech.   CV: Refer to HPI.   PULMONARY:Refer to HPI.   GI:Denies nausea, vomiting, diarrhea, and abdominal pain. Bowel movements are regular.   :Denies urinary alterations, dysuria, urinary frequency, hematuria, and abnormal drainage.   EXT:See HPI  SKIN:Denies abnormal rashes or lesions.   MUSCULOSKELETAL:Denies upper or lower extremity weakness and pain.   NEUROLOGIC:Denies seizures, or upper or lower extremity paresthesia.     EXAM:  Ht 1.702 m (5' 7\")   Wt 93 kg (205 lb)   BMI 32.11 kg/m      Constitutional -  relatively well-appearing man, no pallor, energy level is good, no obvious pain.  Posture normal.      Eyes - No redness, anicteric      Respiratory - calm, regular breathing, normal respiratory effort, no cough noted      Skin - no pallor, no rash noted on face      Psychiatric - calm affect, and interacting appropriately    The rest of a comprehensive physical examination is deferred due to PHE (public health emergency) video visit restrictions      Labs - as reviewed in clinic with patient today:  CBC RESULTS:  Lab Results   Component Value Date    WBC 8.6 05/18/2020    RBC 4.57 05/18/2020    HGB 10.4 (A) 05/18/2020    HCT 34.5 05/18/2020    MCV 76.0 05/18/2020    MCH 22.7 05/18/2020    MCHC 30 05/18/2020    RDW 16.2 05/18/2020     05/18/2020     (H) 04/14/2020       CMP RESULTS:  Lab Results   Component Value Date     05/18/2020    POTASSIUM 4.1 05/18/2020    CHLORIDE 103 05/18/2020    CO2 24 05/18/2020    ANIONGAP 8 05/18/2020     (A) 05/18/2020    BUN 34 05/18/2020    CR 1.7 05/18/2020    GFRESTIMATED 43 05/18/2020    GFRESTBLACK 66 04/14/2020    CALOS 9.2 05/18/2020    BILITOTAL 0.4 05/18/2020    ALBUMIN 4.1 05/18/2020    ALKPHOS 98 05/18/2020    ALT 33 05/18/2020    AST 28 05/18/2020        INR RESULTS:  Lab Results   Component Value Date    " INR 2.3 (A) 05/18/2020       Lab Results   Component Value Date    MAG 1.9 04/08/2020     Lab Results   Component Value Date    NTBNPI 12,559 (H) 02/08/2020     No results found for: NTBNP    Diagnostics    ICD Check 4/14/2020  Medtronic, single lead ICD,  remote transmission received and reviewed. Device transmission sent per MD orders. In clinic device appointment conversion to remote device appointment to minimize COVID19 exposure for high risk patient populations. Pt reports he his the speed of his LVAD changed today and he hasn't felt well since then. His presenting rhythm is a regular ventricular rhythm @ 125 bpm. The morphology is similar to his previous intrinsic tracings. No arrhythmias recorded. Normal device function.  <0.1%. No short V-V intervals recorded. Lead trends appear stable. OptiVol thoracic impedance is establishing his baseline. Battery estimates 11.4 years to PERLA. Pt and Karolina Decker PA-C, notified of transmission results.  KRISTEN Dumont.     Remote ICD transmission.    CXR 3/9/2020:  1.  Stable supportive lines and tubes.  2.  Cardiomegaly with LVAD placement, stable.  3.  Right apical hemothorax evacuation. Right mid/lower lobe  subsegmental atelectasis, overall stable.     CT Chest 3/4/2020:  1. Grossly stable large right apical hemothorax compared to recent  radiographs. Tiny nondisplaced fractures of the medial right first and  second ribs. Tiny right pneumothorax.   2. Intrafissural position of the right basilar chest tube abutting the  pericardium and with the tip immediately inferior to the right hilar  vasculature.   3. Small left pleural effusion, which also contains hyperdense fluid  suspicious for small component of left-sided hemothorax.  4. Stable retrosternal hematoma.  5. Cardiomegaly with small pericardial effusion versus  hemopericardium. Stable LVAD and pericardial drain.     Echo 2/25/2020:  Left ventricular size is normal. Severely reduced left ventricular  systolic  function. Septum is midline  LVAD inflow or outflow cannulae not visualized. Doppler velocities are not  elevated.  Moderate to severely reduced right ventricular systolic function.  Aortic valve appears closed during the entire cardiac cycle. Trace aortic  insufficiency is present.  IVC is plethoric.  No pericardial effusion present.    Assessment and Plan:   Eliseo Tanner is a 64 year old male with chronic systolic heart failure secondary to NICM now s/p HM 3 on 2/18, moderate CAD, HTN, ABHINAV on CPAP, DM2, CKD Stage III, ANA. His HM3 post-op course was complicated by retrosternal hematoma, RV failure,VT in ICU now on amiodarone and Afib w/AVR S/p DCCV on 2/28. He had pre-op proteus and enterococcus bacteremia from 2/13, s/p abx. He had an ICD placed on 3/16/2020.  He presents today for routine clinic visit via video given ongoing COVID 19 outbreak.    He has been feeling well. Gaining some gradual weight, but really not heart failure symptoms with this. Likely calories now that he is eating better, but we will trial a short increase of lasix to see if he gets any benefit.    He also has an elevated D-dimer. He has switched labs. New value is 6.5 times the upper limit of normal for that lab's assay. Please note that the value was manually input into our system and does not allign with the correct units. Actual value is 3220 ng/DL. His last D-dimer here was 11 the upper limit of normal for [facility]'s lab's assay. I have a low suspicion for PE, no SOB at rest or signs of DVT. I have low suspsion for clot thrombosis- no alarms, changes to his parameters, or heart failure symptoms, or dark urine.  We will recheck another value on Friday to confirm his BL at the new lab.    Chronic SCHF secondary to NICM s/p HM III with RV dysfunction. Implanted 2/18 and complicated by bleeding.   Stage D, NYHA Class IIIA  ACEi/ARB Lisinopril 10 mg po daily. Continue Hydralazine 100 mg po TID.   BB Defer s/p recent LVAD  "w/right heart failure on digoxin  Aldosterone antagonist deferred while other medical therapy is prioritized  SCD prophylaxis ICD implanted 3/16.  Fluid status Hypervolemic: Increase lasix to 40/20 x4 days, increase Kcl to 20 meq every other day alternating with 40 meq every other day. Then resume normal dosing.  MAP: Goal 65-85  LDH: stable at 275  Anticoagulation: Coumadin per pharmacy.  Goal 2-3.  Antiplatelet: ASA 81 mg po daily  Speed: Did not tolerate a speed increase to 5600 on a prior attempt  RV support: Continue Digoxin    AoCKD  Cr up to 1.7 from BL of 1.3. Suspect d/t increased volume  - Recheck BMP on Friday after increased diuretics.    VAD interrogation today: N/A video visit, but no alarms per patient  HTN.  Will consider transitioning from hydralazine to higher lisinopril post-covid to avoid fequent need for labs  - Continue Lisinopril   - Continue hydralazine     New Onset Afib s/p DCCV/history of Aflutter. ECG consistent with Afib w/ RVR and aberrancy vs. custodial vs. AT vs. Slow VT. S/p DCCV on 2/28 back into sinus rhythm.  - Coumadin as above.   - Continue Digoxin.     Retrosternal Hematoma. CT chest 2/26: measuring 5.1 cm. S/P chest tube per CVTS 3/5, d/c'ed 3/15. Chest X-ray 3/18 with stable fluid to right interlobular fissure, Hgb stable.   - Follow with CVTS    Follow up   - BMP and D-dimer on Friday  - Clinic in one month      BIJAL Padron TAMAS Garry Clmaryana Tanner is a 64 year old male who is being evaluated via a billable video visit.      The patient has been notified of following:     \"This video visit will be conducted via a call between you and your physician/provider. We have found that certain health care needs can be provided without the need for an in-person physical exam.  This service lets us provide the care you need with a video conversation.  If a prescription is necessary we can send it directly to your pharmacy.  If lab work is needed we can " "place an order for that and you can then stop by our lab to have the test done at a later time.    Video visits are billed at different rates depending on your insurance coverage.  Please reach out to your insurance provider with any questions.    If during the course of the call the physician/provider feels a video visit is not appropriate, you will not be charged for this service.\"    Patient has given verbal consent for Video visit? Yes    How would you like to obtain your AVS? Mail a copy    Patient would like the video invitation sent by: Text to cell phone: [phone]    Will anyone else be joining your video visit? Yes: Wife. How would they like to receive their invitation? Text to cell phone: [phone]      Weight and height were given by the patient and medications were reconciled.     Lauren Grayson CMA    1:11 PM          Please do not hesitate to contact me if you have any questions/concerns.     Sincerely,     Mervat Decker PA-C    "

## 2020-05-19 NOTE — LETTER
Patient Name: Eliseo Tanner   : 1955   Diagnosis/ICD-10: Heart Failure, unspecified I50.9; LVAD Z95.811   Requesting Physician: Dr. Nader Cisneros   Date of Request: 20   Date to Draw Approximately May 18th, 2020     **Please fax results to Gladys VANESSA RN VAD Coord at [phone]. Call [phone] with Questions    ORDER CODE TESTS NANCY.VOL.   X CBASIC Na, K, Cl, CO2, Crea, BUN, Glu, Ca GG 0.5-1    BHEPAT Alb, AlkP, ALT, AST, BBil, TP GG 0.5-1    BLIP Chol, Trig, HDL, LDL GG 1-2    CCOMP Na, K, Cl, CO2, Crea, BUN, Glu, Ca, Alb, AlkP, ALT, AST, Bbil, TP,  GG 0.6-1    HGP CBC & Platelet P 0.3-1    HGDP CBC, Differential & Platelet P 0.3-1    PLT Platelet  P 0.3-1   X INR INR B 2.7    PTT PTT B 2.7    LD Lactate Dehydrogenase GG 0.3-1    PHGB Plasma Hemoglobin GG 2-3    Pre-Albumin Pre-Albumin RG 1.0   X  D Dimer             Signed,      Nader Cisneros MD  Heart Failure, Mechanical Circulatory Support and Transplant Cardiology   of Medicine,  Division of Cardiology, [facility]

## 2020-05-22 ENCOUNTER — CARE COORDINATION (OUTPATIENT)
Dept: CARDIOLOGY | Facility: CLINIC | Age: 65
End: 2020-05-22

## 2020-05-22 DIAGNOSIS — I46.9 CARDIAC ARREST (H): ICD-10-CM

## 2020-05-22 DIAGNOSIS — I50.23 ACUTE ON CHRONIC SYSTOLIC CONGESTIVE HEART FAILURE (H): Primary | ICD-10-CM

## 2020-05-22 LAB
ANION GAP SERPL CALCULATED.3IONS-SCNC: 9 MMOL/L
BUN SERPL-MCNC: 31 MG/DL
CALCIUM SERPL-MCNC: 9.3 MG/DL
CHLORIDE SERPLBLD-SCNC: 98 MMOL/L
CO2 SERPL-SCNC: 27 MMOL/L
CREAT SERPL-MCNC: 1.5 MG/DL
D DIMER PPP FEU-MCNC: 3020 NG/DL
GFR SERPL CREATININE-BSD FRML MDRD: 50 ML/MIN/1.73M2
GLUCOSE SERPL-MCNC: 150 MG/DL (ref 70–99)
POTASSIUM SERPL-SCNC: 4.1 MMOL/L
SODIUM SERPL-SCNC: 134 MMOL/L

## 2020-05-22 NOTE — PROGRESS NOTES
D:  Pt called to report he had labs today.  K 4.1.  Creat 1.5.  Pt's weight is down to 202 which is 4 pounds down from when he spoke w/ Karolina.  Pt wanted to clarify his lasix dose  I:  Instructed pt to go back to 40mg daily.  A:  Pt verbalized understanding.  P:  Pt to RTC 6/16.

## 2020-05-26 RX ORDER — FUROSEMIDE 20 MG
40 TABLET ORAL DAILY
Qty: 90 TABLET | Refills: 3 | Status: SHIPPED | OUTPATIENT
Start: 2020-05-26 | End: 2020-05-27

## 2020-05-27 ENCOUNTER — CARE COORDINATION (OUTPATIENT)
Dept: CARDIOLOGY | Facility: CLINIC | Age: 65
End: 2020-05-27

## 2020-05-27 DIAGNOSIS — Z95.811 LVAD (LEFT VENTRICULAR ASSIST DEVICE) PRESENT (H): ICD-10-CM

## 2020-05-27 RX ORDER — POTASSIUM CHLORIDE 1500 MG/1
TABLET, EXTENDED RELEASE ORAL
Qty: 90 TABLET | Refills: 3 | COMMUNITY
Start: 2020-05-27 | End: 2020-06-04

## 2020-05-27 RX ORDER — FUROSEMIDE 20 MG
TABLET ORAL
Qty: 90 TABLET | Refills: 3 | COMMUNITY
Start: 2020-05-27 | End: 2020-06-04

## 2020-05-27 NOTE — PROGRESS NOTES
Called patient to check in 10 week post discharge. Pt reports VAD parameters stable (5500, 4.4, 3.2, 4.7) and weight 202- 204. Reviewed medications and answered any questions. Patient reports not sleeping very well.  Pt encouraged to call primary MD to address.  Patient is able to move around the house and care for himself independently.     Discussed specific new problems/stressors since being discharged from the hospital: only insomnia.  Empathized with patient and reviewed coping strategies: enlisting support from friends and love ones, attending patient and caregiver support groups, reviewing LVAD educational materials to reinforce knowledge, and talking about concerns with family/care providers/trusted others. Encouraged pt to page VAD Coordinator with any issues or questions. Pt verbalizes understanding.

## 2020-05-27 NOTE — PROGRESS NOTES
D:  Rcvd BMP & D Dimer (result of 3020) from 5/22.  I:  Karolina MATHIAS advised. Plan: continue the higher diuretic, lasix 40/20. Alternating Kcl 40 meq every other day with 20 meq every other day.  BMP & d dimer in about 7-10 days.  Lab order sent for June 3rd.  A: Improved labs  P:  Pt verbalized understanding of the instructions given.  Will call VAD coordinator with further needs and questions.

## 2020-05-27 NOTE — LETTER
Patient Name: Eliseo Tanner   : 1955   Diagnosis/ICD-10: Heart Failure, unspecified I50.9; LVAD Z95.811   Requesting Physician: Dr. Nader Cisneros   Date of Request: 20   Date to Draw Approximately 2020     **Please fax results to Gladys VANESSA RN VAD Coord at [phone]. Call [phone] with Questions    ORDER CODE TESTS NANCY.VOL.   X CBASIC Na, K, Cl, CO2, Crea, BUN, Glu, Ca GG 0.5-1    BHEPAT Alb, AlkP, ALT, AST, BBil, TP GG 0.5-1    BLIP Chol, Trig, HDL, LDL GG 1-2    CCOMP Na, K, Cl, CO2, Crea, BUN, Glu, Ca, Alb, AlkP, ALT, AST, Bbil, TP,  GG 0.6-1    HGP CBC & Platelet P 0.3-1    HGDP CBC, Differential & Platelet P 0.3-1    PLT Platelet  P 0.3-1   X INR INR B 2.7    PTT PTT B 2.7    LD Lactate Dehydrogenase GG 0.3-1    PHGB Plasma Hemoglobin GG 2-3    Pre-Albumin Pre-Albumin RG 1.0   X  D Dimer             Signed,      Nader Cisneros MD  Heart Failure, Mechanical Circulatory Support and Transplant Cardiology   of Medicine,  Division of Cardiology, [facility]

## 2020-06-03 ENCOUNTER — ANTICOAGULATION THERAPY VISIT (OUTPATIENT)
Dept: ANTICOAGULATION | Facility: CLINIC | Age: 65
End: 2020-06-03

## 2020-06-03 DIAGNOSIS — Z95.811 LVAD (LEFT VENTRICULAR ASSIST DEVICE) PRESENT (H): ICD-10-CM

## 2020-06-03 LAB
ANION GAP SERPL CALCULATED.3IONS-SCNC: 8 MMOL/L
BUN SERPL-MCNC: 52 MG/DL
CALCIUM SERPL-MCNC: 9.5 MG/DL
CHLORIDE SERPLBLD-SCNC: 101 MMOL/L
CO2 SERPL-SCNC: 26 MMOL/L
CREAT SERPL-MCNC: 1.6 MG/DL
D DIMER PPP FEU-MCNC: 2560 NG/DL
GFR SERPL CREATININE-BSD FRML MDRD: 46 ML/MIN/1.73M2
GLUCOSE SERPL-MCNC: 141 MG/DL (ref 70–99)
INR PPP: 2.4 (ref 0.9–1.1)
POTASSIUM SERPL-SCNC: 4.3 MMOL/L
SODIUM SERPL-SCNC: 135 MMOL/L

## 2020-06-03 NOTE — PROGRESS NOTES
ANTICOAGULATION FOLLOW-UP CLINIC VISIT    Patient Name:  Eliseo Tanner  Date:  6/3/2020  Contact Type:  Telephone    SUBJECTIVE:  Patient Findings     Comments:   Spoke with spouse, Veronique.  She reports Eliseo is taking 20 mg of lasix in the afternoon and potassium every other day.  No other changes in health, diet, medications.          Clinical Outcomes     Comments:   Spoke with spouse, Veronique.  She reports Eliseo is taking 20 mg of lasix in the afternoon and potassium every other day.  No other changes in health, diet, medications.             OBJECTIVE    Recent labs: (last 7 days)     20   INR 2.4*       ASSESSMENT / PLAN  INR assessment THER    Recheck INR In: 2 WEEKS    INR Location Outside lab      Anticoagulation Summary  As of 6/3/2020    INR goal:   2.0-3.0   TTR:   79.2 % (2.3 mo)   INR used for dosin.4 (6/3/2020)   Warfarin maintenance plan:   4 mg (4 mg x 1) every day   Full warfarin instructions:   4 mg every day   Weekly warfarin total:   28 mg   No change documented:   Krysta Mcdaniel RN   Plan last modified:   Krysta Mcdaniel RN (2020)   Next INR check:   2020   Priority:   Critical   Target end date:   Indefinite    Indications    LVAD (left ventricular assist device) present (H) [Z95.811]             Anticoagulation Episode Summary     INR check location:       Preferred lab:       Send INR reminders to:   [facility]    Comments:   Patient on Amiodarone +++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 RICHARDS's NOTE 2/15/20++++  HM 3  placed 2020, 81mg ASA, Speak to spouseVeronique at [phone]  2020:  Lab: [facility]:  ph: [phone] opt 5;   fax: [phone]      Anticoagulation Care Providers     Provider Role Specialty Phone number    Gucci Tomas MD Referring Cardiology [phone]            See the Encounter Report to view Anticoagulation Flowsheet and Dosing Calendar (Go to Encounters tab in chart review, and find the  Anticoagulation Therapy Visit)    Spoke with spouse, Veronique.    Patient had LVAD placed on:   2/18/2020  Type of LVAD: HM 3  Patient's current Aspirin dose: 81 mg daily  LVAD Protocol followed: Yes     Krysta Mcdaniel RN

## 2020-06-04 ENCOUNTER — CARE COORDINATION (OUTPATIENT)
Dept: CARDIOLOGY | Facility: CLINIC | Age: 65
End: 2020-06-04

## 2020-06-04 DIAGNOSIS — Z95.811 LVAD (LEFT VENTRICULAR ASSIST DEVICE) PRESENT (H): ICD-10-CM

## 2020-06-04 RX ORDER — POTASSIUM CHLORIDE 1500 MG/1
20 TABLET, EXTENDED RELEASE ORAL DAILY
Qty: 90 TABLET | Refills: 3 | COMMUNITY
Start: 2020-06-04 | End: 2020-07-14

## 2020-06-04 RX ORDER — FUROSEMIDE 20 MG
40 TABLET ORAL DAILY
Qty: 90 TABLET | Refills: 3 | COMMUNITY
Start: 2020-06-04 | End: 2020-07-14

## 2020-06-04 NOTE — LETTER
Patient Name: Eliseo Tanner    1955   Diagnosis/ICD-9: Heart Failure, unspecified I50.9; LVAD Z95.811   Requesting Physician: Dr. Gucci Tomas   Date of Request: 20     **Please call Gladys Mccarthy RN VAD Coord at [phone].                Call [phone] with Questions    ORDER Date TESTS   Cardiac Rehab  Evaluate and treat for cardiac rehabilitation. Pt with NYHA class III-IV heart failure symptoms for >6months. EF 5-10%. No major cardiovascular hospitalization for procedures in the last 6 weeks and none planned for the next 6 months.        Signature,         Dr. Gucci Tomas          Professor of Medicine  [facility]  School of Medicine, Cardiovascular Division      HeartMate 3 LVAD    Contacts: VAD Coordinating Team: [phone]     on-call VAD Coordinator: [phone], choose option 4 to speak to the  - Ask him/her to page the VAD coordinator on call.    Guidelines and Precautions for OP-cardiac rehab.    ? Patients can do Stairmaster, bike or NuStep. Limit knee lifts and avoid rowing machine or exercises that include twisting in the abdomen.    ? Encourage activities such as: Treadmill, stair climbing, and arm ergometer.    ? Attempt to achieve a target of 11-14 on the Rating of Perceived Exertion or RPE scale.  ? In some cases we do encourage light muscle conditioning with upper and lower body.  Patient s native heart rate should not exceed 120 BPM during exercise without an order.    ? Telemetry will be helpful to identify sustained V-tach or other underlying arrhythmias. Keep in mind that the patient may or may not be symptomatic with arrhythmias.    ? Manual and automatic cuff blood pressures may be difficult to obtain due to the narrow pulse pressure created by the continuous (rather than pulsatile) flow pump.    ? Oximetry readings may also be difficult to obtain due to low pulsatility.    ? Symptoms that may result from activity intolerance:  lightheadedness, SOB, Tachycardia, exaggerated BP response.    ? Patients should always wear driveline anchor and have controller secured during exercise.    ? Sternotomy precautions for 6 weeks post-op.    ? The patient s Travel Bag should accompany him/her at all times complete with back-up controller, battery clips, and spare batteries.    ? A set of 2 batteries will last 12-16 hours.    ? The goal is for the patient to achieve 30 minutes of continuous treadmill walking prior to heart transplantation.      For further questions please contact the on-call VAD Coordinator

## 2020-06-04 NOTE — PROGRESS NOTES
D: Follow up labs rcvd.  Minimally changed.  Weight stable btwn 201-203. Denies SOB or increased swelling.  I:  Pt notified lab values not as expected per Karolina MATHIAS, follow up needed decrease Lasix to 40 mg daily and Potassium 20 mEq daily.  Labs to be repeated June 16th ([facility])  A:  Pt verbalized understanding  P:  Pt will call VAD coordinator with further needs and questions.

## 2020-06-04 NOTE — LETTER
Patient Name: Eliseo Tanner   : 1955   Diagnosis/ICD-10: Heart Failure, unspecified I50.9; LVAD Z95.811   Requesting Physician: Dr. Nader Cisneros   Date of Request:    Date to Draw Approximately 2020     **Please fax results to Gladys VANESSA RN VAD Coord at [phone]. Call [phone] with Questions    ORDER CODE TESTS NANCY.VOL.   X CBASIC Na, K, Cl, CO2, Crea, BUN, Glu, Ca GG 0.5-1    BHEPAT Alb, AlkP, ALT, AST, BBil, TP GG 0.5-1    BLIP Chol, Trig, HDL, LDL GG 1-2    CCOMP Na, K, Cl, CO2, Crea, BUN, Glu, Ca, Alb, AlkP, ALT, AST, Bbil, TP,  GG 0.6-1    HGP CBC & Platelet P 0.3-1    HGDP CBC, Differential & Platelet P 0.3-1    PLT Platelet  P 0.3-1   X INR INR B 2.7    PTT PTT B 2.7    LD Lactate Dehydrogenase GG 0.3-1    PHGB Plasma Hemoglobin GG 2-3    Pre-Albumin Pre-Albumin RG 1.0     D Dimer             Signed,      Nader Cisneros MD  Heart Failure, Mechanical Circulatory Support and Transplant Cardiology   of Medicine,  Division of Cardiology, [facility]

## 2020-06-15 ENCOUNTER — ANCILLARY PROCEDURE (OUTPATIENT)
Dept: CARDIOLOGY | Facility: CLINIC | Age: 65
End: 2020-06-15
Attending: NURSE PRACTITIONER
Payer: MEDICARE

## 2020-06-15 DIAGNOSIS — Z95.810 S/P ICD (INTERNAL CARDIAC DEFIBRILLATOR) PROCEDURE: ICD-10-CM

## 2020-06-15 PROCEDURE — 99207 CARDIAC DEVICE CHECK - REMOTE: CPT | Performed by: INTERNAL MEDICINE

## 2020-06-15 PROCEDURE — 93296 REM INTERROG EVL PM/IDS: CPT | Mod: ZF

## 2020-06-16 ENCOUNTER — ANTICOAGULATION THERAPY VISIT (OUTPATIENT)
Dept: ANTICOAGULATION | Facility: CLINIC | Age: 65
End: 2020-06-16

## 2020-06-16 ENCOUNTER — VIRTUAL VISIT (OUTPATIENT)
Dept: CARDIOLOGY | Facility: CLINIC | Age: 65
End: 2020-06-16
Attending: PHYSICIAN ASSISTANT
Payer: MEDICARE

## 2020-06-16 DIAGNOSIS — Z95.811 LVAD (LEFT VENTRICULAR ASSIST DEVICE) PRESENT (H): ICD-10-CM

## 2020-06-16 LAB
ALBUMIN SERPL-MCNC: 4.1 G/DL
ALP SERPL-CCNC: 105 U/L
ALT SERPL-CCNC: 61 U/L
ANION GAP SERPL CALCULATED.3IONS-SCNC: 6 MMOL/L
AST SERPL-CCNC: 57 U/L
BILIRUB SERPL-MCNC: 0.3 MG/DL
BUN SERPL-MCNC: 38 MG/DL
CALCIUM SERPL-MCNC: 9 MG/DL
CHLORIDE SERPLBLD-SCNC: 102 MMOL/L
CO2 SERPL-SCNC: 27 MMOL/L
CREAT SERPL-MCNC: 1.6 MG/DL
D DIMER PPP FEU-MCNC: 2510 NG/DL
ERYTHROCYTE [DISTWIDTH] IN BLOOD BY AUTOMATED COUNT: 19 %
GFR SERPL CREATININE-BSD FRML MDRD: 46 ML/MIN/1.73M2
GLUCOSE SERPL-MCNC: 124 MG/DL (ref 70–99)
HCT VFR BLD AUTO: 35.5 %
HEMOGLOBIN: 10.7 G/DL (ref 13.3–17.7)
INR PPP: 2.3 (ref 0.9–1.1)
LDH SERPL-CCNC: 249 U/L
MCH RBC QN AUTO: 21.3 PG
MCHC RBC AUTO-ENTMCNC: 30.1 G/DL
MCV RBC AUTO: 71 FL
PLATELET # BLD AUTO: 378 10^9/L
POTASSIUM SERPL-SCNC: 4.4 MMOL/L
PROT SERPL-MCNC: 7.9 G/DL
RBC # BLD AUTO: 5.01 10^12/L
SODIUM SERPL-SCNC: 135 MMOL/L
WBC # BLD AUTO: 7 10^9/L

## 2020-06-16 PROCEDURE — 99214 OFFICE O/P EST MOD 30 MIN: CPT | Mod: 95 | Performed by: PHYSICIAN ASSISTANT

## 2020-06-16 NOTE — PROGRESS NOTES
"Eliseo Tanner is a 64 year old male who is being evaluated via a billable video visit.      The patient has been notified of following:     \"This video visit will be conducted via a call between you and your physician/provider. We have found that certain health care needs can be provided without the need for an in-person physical exam.  This service lets us provide the care you need with a video conversation.  If a prescription is necessary we can send it directly to your pharmacy.  If lab work is needed we can place an order for that and you can then stop by our lab to have the test done at a later time.    Video visits are billed at different rates depending on your insurance coverage.  Please reach out to your insurance provider with any questions.    If during the course of the call the physician/provider feels a video visit is not appropriate, you will not be charged for this service.\"    Patient has given verbal consent for Video visit? Yes    [email or web address]      "

## 2020-06-16 NOTE — PROGRESS NOTES
"Changed to phone visit as patient was not connected into the video.  Start time 2:23 pm  End time 2:35 PM      HPI:   Eliseo Tanner is a 64 year old male with chronic systolic heart failure secondary to NICM now s/p HM 3 on 2/18, moderate CAD, HTN, ABHINAV on CPAP, DM2, CKD Stage III, ANA. His HM3 post-op course was complicated by retrosternal hematoma and bleeding in the lungs, RV failure,VT in ICU now on amiodarone and Afib w/AVR S/p DCCV on 2/28. He had pre-op proteus and enterococcus bacteremia from 2/13, s/p abx. He had an ICD placed on 3/16/2020. He presents today for an inperson visit for LVAD follow-up.    Last visit 5/19/2020 with Mervat Decker  Weight was up a bit. Increased his lasix x4 days and then came back to normal dosing.    This visit  His weight has been ranging 203-207. The lowest weight I have seen him is 195 lbs, but has been very gradually increasing. He denies SOB at rest. He has MIRANDA after 3-4 blocks. No LE edema or abdominal edema. No orthopnea or PND. He has some dizziness with quick position changes. No presyncope or syncope.  No palpitations. No chest pain.     He is starting cardiac rehab tomorrow.    He has no blood in the urine, blood in the stool or prolonged nosebleeds.    No driveline redness. He has had some \"crusty\" drainage wich is improving. Has been present since the LVAD was implanted.  No pain.    Occasional headaches which is not new for him. No stroke symptoms.    Does not have the ability to take blood pressures at home.      PAST MEDICAL HISTORY:  No past medical history on file.    FAMILY HISTORY:  No family history on file.    SOCIAL HISTORY:  Social History     Socioeconomic History     Marital status:      Spouse name: Not on file     Number of children: Not on file     Years of education: Not on file     Highest education level: Not on file   Occupational History     Not on file   Social Needs     Financial resource strain: Not on file     Food " insecurity     Worry: Not on file     Inability: Not on file     Transportation needs     Medical: Not on file     Non-medical: Not on file   Tobacco Use     Smoking status: Not on file   Substance and Sexual Activity     Alcohol use: Not on file     Drug use: Not on file     Sexual activity: Not on file   Lifestyle     Physical activity     Days per week: Not on file     Minutes per session: Not on file     Stress: Not on file   Relationships     Social connections     Talks on phone: Not on file     Gets together: Not on file     Attends Orthodox service: Not on file     Active member of club or organization: Not on file     Attends meetings of clubs or organizations: Not on file     Relationship status: Not on file     Intimate partner violence     Fear of current or ex partner: Not on file     Emotionally abused: Not on file     Physically abused: Not on file     Forced sexual activity: Not on file   Other Topics Concern     Not on file   Social History Narrative     Not on file       CURRENT MEDICATIONS:  acetaminophen (TYLENOL) 325 MG tablet, Take 2 tablets (650 mg) by mouth every 4 hours as needed for mild pain or fever  albuterol (PROAIR HFA/PROVENTIL HFA/VENTOLIN HFA) 108 (90 Base) MCG/ACT inhaler, Inhale 2 puffs into the lungs every 6 hours as needed for wheezing (Patient not taking: Reported on 2020)  allopurinol (ZYLOPRIM) 100 MG tablet, 2 tablets (200 mg) by Oral or Feeding Tube route daily  amiodarone (PACERONE) 200 MG tablet, Take 1 tablet (200 mg) by mouth daily  aspirin (ASA) 81 MG EC tablet, Take 1 tablet (81 mg) by mouth daily  atorvastatin (LIPITOR) 20 MG tablet, 1 tablet (20 mg) by Oral or Feeding Tube route every evening  [] cephALEXin (KEFLEX) 500 MG capsule, Take 1 capsule (500 mg) by mouth 3 times daily for 5 days  [] cephalexin 500 MG tablet, Take 500 mg by mouth 3 times daily for 5 days  co-enzyme Q-10 200 MG CAPS, 200 mg by Oral or Feeding Tube route daily  diclofenac  (VOLTAREN) 1 % topical gel, Apply 4 g topically 4 times daily as needed for moderate pain  digoxin (LANOXIN) 125 MCG tablet, Take 1 tablet (125 mcg) by mouth daily  FLUoxetine (PROZAC) 20 MG capsule, Take 1 capsule (20 mg) by mouth daily  furosemide (LASIX) 20 MG tablet, Take 2 tablets (40 mg) by mouth daily  hydrALAZINE (APRESOLINE) 100 MG tablet, Take 1 tablet (100 mg) by mouth 3 times daily  insulin glargine (LANTUS PEN) 100 UNIT/ML pen, Inject 10 Units Subcutaneous At Bedtime  insulin pen needle (BD JAIME U/F) 32G X 4 MM miscellaneous,   lisinopril (ZESTRIL) 10 MG tablet, Take 1 tablet (10 mg) by mouth daily  magnesium oxide (MAG-OX) 400 MG tablet, 1 tablet (400 mg) by Oral or Feeding Tube route daily  methocarbamol (ROBAXIN) 500 MG tablet, Take 1 tablet (500 mg) by mouth 4 times daily as needed for muscle spasms  MULTIPLE MINERALS-VITAMINS PO, Take 1 tablet by mouth daily  multivitamin w/minerals (THERA-VIT-M) tablet, Take 1 tablet by mouth daily  ondansetron (ZOFRAN) 4 MG tablet, Take 4 mg by mouth every 4 hours as needed for nausea  pantoprazole (PROTONIX) 40 MG EC tablet, Take 1 tablet (40 mg) by mouth every morning (before breakfast)  potassium chloride ER (KLOR-CON M) 20 MEQ CR tablet, Take 1 tablet (20 mEq) by mouth daily  warfarin ANTICOAGULANT (COUMADIN) 4 MG tablet, Take 4-8mg daily or as directed by the coumadin clinic    No current facility-administered medications on file prior to visit.       ROS:   CONSTITUTIONAL: Denies fever, chills, fatigue.  HEENT: Denies vision changes, and changes in speech.   CV: Refer to HPI.   PULMONARY:Refer to HPI.   GI:Denies nausea, vomiting, diarrhea, and abdominal pain. Bowel movements are regular.   :Denies urinary alterations, dysuria, urinary frequency, hematuria, and abnormal drainage.   EXT:See HPI  SKIN:Denies abnormal rashes or lesions.   MUSCULOSKELETAL:Denies upper or lower extremity weakness and pain.   NEUROLOGIC:Denies seizures, or upper or lower  extremity paresthesia.     EXAM:  There were no vitals taken for this visit.    N/A phone visit    Labs - as reviewed in clinic with patient today:  CBC RESULTS:  Lab Results   Component Value Date    WBC 8.6 05/18/2020    RBC 4.57 05/18/2020    HGB 10.4 (A) 05/18/2020    HCT 34.5 05/18/2020    MCV 76.0 05/18/2020    MCH 22.7 05/18/2020    MCHC 30 05/18/2020    RDW 16.2 05/18/2020     05/18/2020     (H) 04/14/2020       CMP RESULTS:  Lab Results   Component Value Date     06/03/2020    POTASSIUM 4.3 06/03/2020    CHLORIDE 101 06/03/2020    CO2 26 06/03/2020    ANIONGAP 8 06/03/2020     (A) 06/03/2020    BUN 52 06/03/2020    CR 1.6 06/03/2020    GFRESTIMATED 46 06/03/2020    GFRESTBLACK 66 04/14/2020    CALOS 9.5 06/03/2020    BILITOTAL 0.4 05/18/2020    ALBUMIN 4.1 05/18/2020    ALKPHOS 98 05/18/2020    ALT 33 05/18/2020    AST 28 05/18/2020        INR RESULTS:  Lab Results   Component Value Date    INR 2.4 (A) 06/03/2020       Lab Results   Component Value Date    MAG 1.9 04/08/2020     Lab Results   Component Value Date    NTBNPI 12,559 (H) 02/08/2020     No results found for: NTLogicalwareP    Diagnostics    ICK check 6/15/2020  In clinic device appointment converted to remote device appointment to minimize COVID19 exposure for high risk patient populations. Patient has an appt with STEW Aguilar on 5/16/2020. Remote ICD transmission received and reviewed. Device transmission sent per MD orders. Patient has a Medtronic single lead ICD. Normal ICD function. 78 AT/AF episodes recorded - < 1 min - 1 hour 6 min in duration. AF burden = 1%. Patient is taking Warfarin. Presenting EGM = irregular ventricular rhythm @ ~ 100 bpm.  = <0.1%. OptiVol fluid index is near baseline. Estimated battery longevity to PERLA = 11.3 years. No short v-v intervals recorded. Lead trends appear stable. Patient notified of interrogation results. Patient reports that he is feeling well and denies specific complaints.  Plan for patient to return to clinic on 8/26/2020 as scheduled.      Remote ICD transmission    ICD Check 4/14/2020  Medtronic, single lead ICD,  remote transmission received and reviewed. Device transmission sent per MD orders. In clinic device appointment conversion to remote device appointment to minimize COVID19 exposure for high risk patient populations. Pt reports he his the speed of his LVAD changed today and he hasn't felt well since then. His presenting rhythm is a regular ventricular rhythm @ 125 bpm. The morphology is similar to his previous intrinsic tracings. No arrhythmias recorded. Normal device function.  <0.1%. No short V-V intervals recorded. Lead trends appear stable. OptiVol thoracic impedance is establishing his baseline. Battery estimates 11.4 years to PERLA. Pt and Karolina Decker PA-C, notified of transmission results.  KRISTEN Dumont.     Remote ICD transmission.    CXR 3/9/2020:  1.  Stable supportive lines and tubes.  2.  Cardiomegaly with LVAD placement, stable.  3.  Right apical hemothorax evacuation. Right mid/lower lobe  subsegmental atelectasis, overall stable.     CT Chest 3/4/2020:  1. Grossly stable large right apical hemothorax compared to recent  radiographs. Tiny nondisplaced fractures of the medial right first and  second ribs. Tiny right pneumothorax.   2. Intrafissural position of the right basilar chest tube abutting the  pericardium and with the tip immediately inferior to the right hilar  vasculature.   3. Small left pleural effusion, which also contains hyperdense fluid  suspicious for small component of left-sided hemothorax.  4. Stable retrosternal hematoma.  5. Cardiomegaly with small pericardial effusion versus  hemopericardium. Stable LVAD and pericardial drain.     Echo 2/25/2020:  Left ventricular size is normal. Severely reduced left ventricular systolic  function. Septum is midline  LVAD inflow or outflow cannulae not visualized. Doppler velocities are  not  elevated.  Moderate to severely reduced right ventricular systolic function.  Aortic valve appears closed during the entire cardiac cycle. Trace aortic  insufficiency is present.  IVC is plethoric.  No pericardial effusion present.    Assessment and Plan:   Eliseo Tanner is a 64 year old male with chronic systolic heart failure secondary to NICM now s/p HM 3 on 2/18, moderate CAD, HTN, ABHINAV on CPAP, DM2, CKD Stage III, ANA. His HM3 post-op course was complicated by retrosternal hematoma, RV failure,VT in ICU now on amiodarone and Afib w/AVR S/p DCCV on 2/28. He had pre-op proteus and enterococcus bacteremia from 2/13, s/p abx. He had an ICD placed on 3/16/2020.  He presents today for routine clinic visit via video given ongoing COVID 19 outbreak.    He has been feeling well. Gaining some gradual weight, but really not heart failure symptoms with this. Likely calories now that he is eating better. At the last appointment we did tiral increased lasix but this did not make him feel any better, nor did it decrease his weight.    IVC on 4/14 was normal with Cr of 1.3. Weight on 4/14 was 201 (now is about 203). Since then has been fluctuating 1.5-1.7. Unclear reason for the increase. Last lisinopril increase was in early March..     He is asking about going to see family in [place] . We did discuss that there are risks of seeing family, especially as they are going out of the home to work. The best medical recommendation would be to stay home in self quarantine. However, he must balance these recommendations with his own quality of life. He will weight the risk and benefits. He know to let us know if he does travel so we can make him aware of the nearest LVAD center should problems arrise.    Chronic SCHF secondary to NICM s/p HM III with RV dysfunction. Implanted 2/18 and complicated by bleeding.   Stage D, NYHA Class IIIA  ACEi/ARB Lisinopril 10 mg po daily. Continue Hydralazine 100 mg po TID.   BB Defer s/p  recent LVAD w/right heart failure on digoxin  Aldosterone antagonist deferred while other medical therapy is prioritized  SCD prophylaxis ICD implanted 3/16.  Fluid status Euvolemic: Continue lasix 40 mg dialy and Kcl 20 meq daily  MAP: Goal 65-85  LDH: stable at 249  Anticoagulation: Coumadin per pharmacy.  Goal 2-3.  Antiplatelet: ASA 81 mg po daily  Speed: Did not tolerate a speed increase to 5600 on a prior attempt  RV support: Continue Digoxin    AoCKD  Cr up to 1.6 from BL of 1.3. Did not improve with increased diuretic challenge    VAD interrogation today: N/A video visit, but no alarms per patient    HTN.  Will consider transitioning from hydralazine to higher lisinopril post-covid to avoid fequent need for labs, but we have been holding off given increased Cr  - Continue Lisinopril   - Continue hydralazine     New Onset Afib s/p DCCV/history of Aflutter. History of Afib w/ RVR and aberrancy vs. senior living vs. AT vs. Slow VT. S/p DCCV on 2/28 back into sinus rhythm.  - Coumadin as above.   - Continue Digoxin.     Retrosternal Hematoma. CT chest 2/26: measuring 5.1 cm. S/P chest tube per CVTS 3/5, d/c'ed 3/15. Chest X-ray 3/18 with stable fluid to right interlobular fissure, Hgb stable.   - Follow with [facility]    Follow up   - BP review in 1 week- consider lisinopril/hydralazine changes as Cr (although up, is stable x2 months)  - Dr. Cisneros and EP as scheduled in August      BIJAL Padron TAMAS

## 2020-06-16 NOTE — PROGRESS NOTES
ANTICOAGULATION FOLLOW-UP CLINIC VISIT    Patient Name:  Eliseo Tanner  Date:  2020  Contact Type:  Telephone    SUBJECTIVE:  Patient Findings         Clinical Outcomes     Negatives:   Major bleeding event, Thromboembolic event, Anticoagulation-related hospital admission, Anticoagulation-related ED visit, Anticoagulation-related fatality           OBJECTIVE    Recent labs: (last 7 days)     20   INR 2.3*       ASSESSMENT / PLAN  INR assessment THER    Recheck INR In: 2 WEEKS    INR Location Clinic      Anticoagulation Summary  As of 2020    INR goal:   2.0-3.0   TTR:   82.5 % (2.7 mo)   INR used for dosin.3 (2020)   Warfarin maintenance plan:   4 mg (4 mg x 1) every day   Full warfarin instructions:   4 mg every day   Weekly warfarin total:   28 mg   No change documented:   Luiza Perkins, RN   Plan last modified:   Krysta Mcdaniel RN (2020)   Next INR check:   2020   Priority:   Critical   Target end date:   Indefinite    Indications    LVAD (left ventricular assist device) present (H) [Z95.811]             Anticoagulation Episode Summary     INR check location:       Preferred lab:       Send INR reminders to:   [facility]    Comments:   Patient on Amiodarone +++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 RICHARDS's NOTE 2/15/20++++  HM 3  placed 2020, 81mg ASA, Speak to spouse, Veronique at [phone]  2020:  Lab: [facility]:  ph: [phone] opt 5;   fax: [phone]      Anticoagulation Care Providers     Provider Role Specialty Phone number    Gucci Tomas MD Referring Cardiology [phone]            See the Encounter Report to view Anticoagulation Flowsheet and Dosing Calendar (Go to Encounters tab in chart review, and find the Anticoagulation Therapy Visit)    Spoke with patient. Gave them their lab results and new warfarin recommendation.  No changes in health, medication, or diet. No missed doses, no falls. No signs or  symptoms of bleed or clotting.     Patient had LVAD placed on:   2/18/20  Type of LVAD: HM 3  Patient's current Aspirin dose: ASA 81mg Daily  LVAD Protocol followed: Yes   If Not Followed Explanation:  N/A    Luiza Perkins RN

## 2020-06-16 NOTE — LETTER
Patient Name: Eliseo Tanner   : 1955   Diagnosis/ICD-10: Heart Failure, unspecified I50.9; LVAD Z95.811   Requesting Physician: Dr. Nader Cisneros   Date of Request:    Date to Draw 2020     **Please fax results to Gladys VANESSA RN VAD Coord at [phone]. Call [phone] with Questions    ORDER CODE TESTS NANCY.VOL.    CBASIC Na, K, Cl, CO2, Crea, BUN, Glu, Ca GG 0.5-1    BHEPAT Alb, AlkP, ALT, AST, BBil, TP GG 0.5-1    BLIP Chol, Trig, HDL, LDL GG 1-2   X CCOMP Na, K, Cl, CO2, Crea, BUN, Glu, Ca, Alb, AlkP, ALT, AST, Bbil, TP,  GG 0.6-1   X HGP CBC & Platelet P 0.3-1    HGDP CBC, Differential & Platelet P 0.3-1    PLT Platelet  P 0.3-1   X INR INR B 2.7    PTT PTT B 2.7   X LD Lactate Dehydrogenase GG 0.3-1    PHGB Plasma Hemoglobin GG 2-3    Pre-Albumin Pre-Albumin RG 1.0   X  D Dimer             Signed,      Nader Cisneros MD  Heart Failure, Mechanical Circulatory Support and Transplant Cardiology   of Medicine,  Division of Cardiology, [facility]

## 2020-06-16 NOTE — LETTER
"6/16/2020      RE: Eliseo Tanner  [address]  [facility] [address]       Dear Colleague,    Thank you for the opportunity to participate in the care of your patient, Eliseo Tanner, at the [facility] HEART [facility] at [facility]. Please see a copy of my visit note below.      HPI:   Eliseo Tanner is a 64 year old male with chronic systolic heart failure secondary to NICM now s/p HM 3 on 2/18, moderate CAD, HTN, ABHINAV on CPAP, DM2, CKD Stage III, ANA. His HM3 post-op course was complicated by retrosternal hematoma and bleeding in the lungs, RV failure,VT in ICU now on amiodarone and Afib w/AVR S/p DCCV on 2/28. He had pre-op proteus and enterococcus bacteremia from 2/13, s/p abx. He had an ICD placed on 3/16/2020. He presents today for an inperson visit for LVAD follow-up.    Last visit 5/19/2020 with Mervat Decker  Weight was up a bit. Increased his lasix x4 days and then came back to normal dosing.    This visit  His weight has been ranging 203-207. The lowest weight I have seen him is 195 lbs, but has been very gradually increasing. He denies SOB at rest. He has MIRANDA after 3-4 blocks. No LE edema or abdominal edema. No orthopnea or PND. He has some dizziness with quick position changes. No presyncope or syncope.  No palpitations. No chest pain.     He is starting cardiac rehab tomorrow.    He has no blood in the urine, blood in the stool or prolonged nosebleeds.    No driveline redness. He has had some \"crusty\" drainage wich is improving. Has been present since the LVAD was implanted.  No pain.    Occasional headaches which is not new for him. No stroke symptoms.    Does not have the ability to take blood pressures at home.      PAST MEDICAL HISTORY:  No past medical history on file.    FAMILY HISTORY:  No family history on file.    SOCIAL HISTORY:  Social History     Socioeconomic History     Marital status:      Spouse name: Not on file     Number of " children: Not on file     Years of education: Not on file     Highest education level: Not on file   Occupational History     Not on file   Social Needs     Financial resource strain: Not on file     Food insecurity     Worry: Not on file     Inability: Not on file     Transportation needs     Medical: Not on file     Non-medical: Not on file   Tobacco Use     Smoking status: Not on file   Substance and Sexual Activity     Alcohol use: Not on file     Drug use: Not on file     Sexual activity: Not on file   Lifestyle     Physical activity     Days per week: Not on file     Minutes per session: Not on file     Stress: Not on file   Relationships     Social connections     Talks on phone: Not on file     Gets together: Not on file     Attends Oriental orthodox service: Not on file     Active member of club or organization: Not on file     Attends meetings of clubs or organizations: Not on file     Relationship status: Not on file     Intimate partner violence     Fear of current or ex partner: Not on file     Emotionally abused: Not on file     Physically abused: Not on file     Forced sexual activity: Not on file   Other Topics Concern     Not on file   Social History Narrative     Not on file       CURRENT MEDICATIONS:  acetaminophen (TYLENOL) 325 MG tablet, Take 2 tablets (650 mg) by mouth every 4 hours as needed for mild pain or fever  albuterol (PROAIR HFA/PROVENTIL HFA/VENTOLIN HFA) 108 (90 Base) MCG/ACT inhaler, Inhale 2 puffs into the lungs every 6 hours as needed for wheezing (Patient not taking: Reported on 2020)  allopurinol (ZYLOPRIM) 100 MG tablet, 2 tablets (200 mg) by Oral or Feeding Tube route daily  amiodarone (PACERONE) 200 MG tablet, Take 1 tablet (200 mg) by mouth daily  aspirin (ASA) 81 MG EC tablet, Take 1 tablet (81 mg) by mouth daily  atorvastatin (LIPITOR) 20 MG tablet, 1 tablet (20 mg) by Oral or Feeding Tube route every evening  [] cephALEXin (KEFLEX) 500 MG capsule, Take 1 capsule (500  mg) by mouth 3 times daily for 5 days  [] cephalexin 500 MG tablet, Take 500 mg by mouth 3 times daily for 5 days  co-enzyme Q-10 200 MG CAPS, 200 mg by Oral or Feeding Tube route daily  diclofenac (VOLTAREN) 1 % topical gel, Apply 4 g topically 4 times daily as needed for moderate pain  digoxin (LANOXIN) 125 MCG tablet, Take 1 tablet (125 mcg) by mouth daily  FLUoxetine (PROZAC) 20 MG capsule, Take 1 capsule (20 mg) by mouth daily  furosemide (LASIX) 20 MG tablet, Take 2 tablets (40 mg) by mouth daily  hydrALAZINE (APRESOLINE) 100 MG tablet, Take 1 tablet (100 mg) by mouth 3 times daily  insulin glargine (LANTUS PEN) 100 UNIT/ML pen, Inject 10 Units Subcutaneous At Bedtime  insulin pen needle (BD JAIME U/F) 32G X 4 MM miscellaneous,   lisinopril (ZESTRIL) 10 MG tablet, Take 1 tablet (10 mg) by mouth daily  magnesium oxide (MAG-OX) 400 MG tablet, 1 tablet (400 mg) by Oral or Feeding Tube route daily  methocarbamol (ROBAXIN) 500 MG tablet, Take 1 tablet (500 mg) by mouth 4 times daily as needed for muscle spasms  MULTIPLE MINERALS-VITAMINS PO, Take 1 tablet by mouth daily  multivitamin w/minerals (THERA-VIT-M) tablet, Take 1 tablet by mouth daily  ondansetron (ZOFRAN) 4 MG tablet, Take 4 mg by mouth every 4 hours as needed for nausea  pantoprazole (PROTONIX) 40 MG EC tablet, Take 1 tablet (40 mg) by mouth every morning (before breakfast)  potassium chloride ER (KLOR-CON M) 20 MEQ CR tablet, Take 1 tablet (20 mEq) by mouth daily  warfarin ANTICOAGULANT (COUMADIN) 4 MG tablet, Take 4-8mg daily or as directed by the coumadin clinic    No current facility-administered medications on file prior to visit.       ROS:   CONSTITUTIONAL: Denies fever, chills, fatigue.  HEENT: Denies vision changes, and changes in speech.   CV: Refer to HPI.   PULMONARY:Refer to HPI.   GI:Denies nausea, vomiting, diarrhea, and abdominal pain. Bowel movements are regular.   :Denies urinary alterations, dysuria, urinary frequency,  hematuria, and abnormal drainage.   EXT:See HPI  SKIN:Denies abnormal rashes or lesions.   MUSCULOSKELETAL:Denies upper or lower extremity weakness and pain.   NEUROLOGIC:Denies seizures, or upper or lower extremity paresthesia.     EXAM:  There were no vitals taken for this visit.    N/A phone visit    Labs - as reviewed in clinic with patient today:  CBC RESULTS:  Lab Results   Component Value Date    WBC 8.6 05/18/2020    RBC 4.57 05/18/2020    HGB 10.4 (A) 05/18/2020    HCT 34.5 05/18/2020    MCV 76.0 05/18/2020    MCH 22.7 05/18/2020    MCHC 30 05/18/2020    RDW 16.2 05/18/2020     05/18/2020     (H) 04/14/2020       CMP RESULTS:  Lab Results   Component Value Date     06/03/2020    POTASSIUM 4.3 06/03/2020    CHLORIDE 101 06/03/2020    CO2 26 06/03/2020    ANIONGAP 8 06/03/2020     (A) 06/03/2020    BUN 52 06/03/2020    CR 1.6 06/03/2020    GFRESTIMATED 46 06/03/2020    GFRESTBLACK 66 04/14/2020    CALOS 9.5 06/03/2020    BILITOTAL 0.4 05/18/2020    ALBUMIN 4.1 05/18/2020    ALKPHOS 98 05/18/2020    ALT 33 05/18/2020    AST 28 05/18/2020        INR RESULTS:  Lab Results   Component Value Date    INR 2.4 (A) 06/03/2020       Lab Results   Component Value Date    MAG 1.9 04/08/2020     Lab Results   Component Value Date    NTBNPI 12,559 (H) 02/08/2020     No results found for: NTBNP    Diagnostics    ICK check 6/15/2020  In clinic device appointment converted to remote device appointment to minimize COVID19 exposure for high risk patient populations. Patient has an appt with STEW Aguilar on 5/16/2020. Remote ICD transmission received and reviewed. Device transmission sent per MD orders. Patient has a Medtronic single lead ICD. Normal ICD function. 78 AT/AF episodes recorded - < 1 min - 1 hour 6 min in duration. AF burden = 1%. Patient is taking Warfarin. Presenting EGM = irregular ventricular rhythm @ ~ 100 bpm.  = <0.1%. OptiVol fluid index is near baseline. Estimated battery  longevity to PERLA = 11.3 years. No short v-v intervals recorded. Lead trends appear stable. Patient notified of interrogation results. Patient reports that he is feeling well and denies specific complaints. Plan for patient to return to clinic on 8/26/2020 as scheduled.      Remote ICD transmission    ICD Check 4/14/2020  Medtronic, single lead ICD,  remote transmission received and reviewed. Device transmission sent per MD orders. In clinic device appointment conversion to remote device appointment to minimize COVID19 exposure for high risk patient populations. Pt reports he his the speed of his LVAD changed today and he hasn't felt well since then. His presenting rhythm is a regular ventricular rhythm @ 125 bpm. The morphology is similar to his previous intrinsic tracings. No arrhythmias recorded. Normal device function.  <0.1%. No short V-V intervals recorded. Lead trends appear stable. OptiVol thoracic impedance is establishing his baseline. Battery estimates 11.4 years to PERLA. Pt and Karolina Decker PA-C, notified of transmission results.  KRISTEN Dumont.     Remote ICD transmission.    CXR 3/9/2020:  1.  Stable supportive lines and tubes.  2.  Cardiomegaly with LVAD placement, stable.  3.  Right apical hemothorax evacuation. Right mid/lower lobe  subsegmental atelectasis, overall stable.     CT Chest 3/4/2020:  1. Grossly stable large right apical hemothorax compared to recent  radiographs. Tiny nondisplaced fractures of the medial right first and  second ribs. Tiny right pneumothorax.   2. Intrafissural position of the right basilar chest tube abutting the  pericardium and with the tip immediately inferior to the right hilar  vasculature.   3. Small left pleural effusion, which also contains hyperdense fluid  suspicious for small component of left-sided hemothorax.  4. Stable retrosternal hematoma.  5. Cardiomegaly with small pericardial effusion versus  hemopericardium. Stable LVAD and pericardial  drain.     Echo 2/25/2020:  Left ventricular size is normal. Severely reduced left ventricular systolic  function. Septum is midline  LVAD inflow or outflow cannulae not visualized. Doppler velocities are not  elevated.  Moderate to severely reduced right ventricular systolic function.  Aortic valve appears closed during the entire cardiac cycle. Trace aortic  insufficiency is present.  IVC is plethoric.  No pericardial effusion present.    Assessment and Plan:   Eliseo Tanner is a 64 year old male with chronic systolic heart failure secondary to NICM now s/p HM 3 on 2/18, moderate CAD, HTN, ABHINAV on CPAP, DM2, CKD Stage III, ANA. His HM3 post-op course was complicated by retrosternal hematoma, RV failure,VT in ICU now on amiodarone and Afib w/AVR S/p DCCV on 2/28. He had pre-op proteus and enterococcus bacteremia from 2/13, s/p abx. He had an ICD placed on 3/16/2020.  He presents today for routine clinic visit via video given ongoing COVID 19 outbreak.    He has been feeling well. Gaining some gradual weight, but really not heart failure symptoms with this. Likely calories now that he is eating better. At the last appointment we did tiral increased lasix but this did not make him feel any better, nor did it decrease his weight.    IVC on 4/14 was normal with Cr of 1.3. Weight on 4/14 was 201 (now is about 203). Since then has been fluctuating 1.5-1.7. Unclear reason for the increase. Last lisinopril increase was in early March..     He is asking about going to see family in [place] . We did discuss that there are risks of seeing family, especially as they are going out of the home to work. The best medical recommendation would be to stay home in self quarantine. However, he must balance these recommendations with his own quality of life. He will weight the risk and benefits. He know to let us know if he does travel so we can make him aware of the nearest LVAD center should problems arrise.    Chronic SCHF  secondary to NICM s/p HM III with RV dysfunction. Implanted 2/18 and complicated by bleeding.   Stage D, NYHA Class IIIA  ACEi/ARB Lisinopril 10 mg po daily. Continue Hydralazine 100 mg po TID.   BB Defer s/p recent LVAD w/right heart failure on digoxin  Aldosterone antagonist deferred while other medical therapy is prioritized  SCD prophylaxis ICD implanted 3/16.  Fluid status Euvolemic: Continue lasix 40 mg dialy and Kcl 20 meq daily  MAP: Goal 65-85  LDH: stable at 249  Anticoagulation: Coumadin per pharmacy.  Goal 2-3.  Antiplatelet: ASA 81 mg po daily  Speed: Did not tolerate a speed increase to 5600 on a prior attempt  RV support: Continue Digoxin    AoCKD  Cr up to 1.6 from BL of 1.3. Did not improve with increased diuretic challenge    VAD interrogation today: N/A video visit, but no alarms per patient    HTN.  Will consider transitioning from hydralazine to higher lisinopril post-covid to avoid fequent need for labs, but we have been holding off given increased Cr  - Continue Lisinopril   - Continue hydralazine     New Onset Afib s/p DCCV/history of Aflutter. History of Afib w/ RVR and aberrancy vs. assisted vs. AT vs. Slow VT. S/p DCCV on 2/28 back into sinus rhythm.  - Coumadin as above.   - Continue Digoxin.     Retrosternal Hematoma. CT chest 2/26: measuring 5.1 cm. S/P chest tube per CVTS 3/5, d/c'ed 3/15. Chest X-ray 3/18 with stable fluid to right interlobular fissure, Hgb stable.   - Follow with CVTS    Follow up   - BP review in 1 week- consider lisinopril/hydralazine changes as Cr (although up, is stable x2 months)  - Dr. Cisneros and EP as scheduled in August      BIJAL Padron TAMAS      Please do not hesitate to contact me if you have any questions/concerns.     Sincerely,     Mervat Decker PA-C

## 2020-06-17 LAB
MDC_IDC_EPISODE_DTM: NORMAL
MDC_IDC_EPISODE_DURATION: 1440 S
MDC_IDC_EPISODE_DURATION: 1560 S
MDC_IDC_EPISODE_DURATION: 1920 S
MDC_IDC_EPISODE_DURATION: 360 S
MDC_IDC_EPISODE_DURATION: 3960 S
MDC_IDC_EPISODE_DURATION: 480 S
MDC_IDC_EPISODE_DURATION: 480 S
MDC_IDC_EPISODE_DURATION: 600 S
MDC_IDC_EPISODE_DURATION: 600 S
MDC_IDC_EPISODE_DURATION: 720 S
MDC_IDC_EPISODE_DURATION: 840 S
MDC_IDC_EPISODE_DURATION: 960 S
MDC_IDC_EPISODE_ID: 167
MDC_IDC_EPISODE_ID: 171
MDC_IDC_EPISODE_ID: 172
MDC_IDC_EPISODE_ID: 173
MDC_IDC_EPISODE_ID: 174
MDC_IDC_EPISODE_ID: 175
MDC_IDC_EPISODE_ID: 176
MDC_IDC_EPISODE_ID: 177
MDC_IDC_EPISODE_ID: 178
MDC_IDC_EPISODE_ID: 179
MDC_IDC_EPISODE_ID: 180
MDC_IDC_EPISODE_ID: 181
MDC_IDC_EPISODE_ID: 182
MDC_IDC_EPISODE_ID: 183
MDC_IDC_EPISODE_ID: 184
MDC_IDC_EPISODE_ID: 185
MDC_IDC_EPISODE_ID: 186
MDC_IDC_EPISODE_ID: 187
MDC_IDC_EPISODE_ID: 188
MDC_IDC_EPISODE_ID: 189
MDC_IDC_EPISODE_ID: 190
MDC_IDC_EPISODE_TYPE: NORMAL
MDC_IDC_LEAD_IMPLANT_DT: NORMAL
MDC_IDC_LEAD_LOCATION: NORMAL
MDC_IDC_LEAD_LOCATION_DETAIL_1: NORMAL
MDC_IDC_LEAD_MFG: NORMAL
MDC_IDC_LEAD_MODEL: NORMAL
MDC_IDC_LEAD_POLARITY_TYPE: NORMAL
MDC_IDC_LEAD_SERIAL: NORMAL
MDC_IDC_LEAD_SPECIAL_FUNCTION: NORMAL
MDC_IDC_MSMT_BATTERY_DTM: NORMAL
MDC_IDC_MSMT_BATTERY_REMAINING_LONGEVITY: 136 MO
MDC_IDC_MSMT_BATTERY_RRT_TRIGGER: 2.73
MDC_IDC_MSMT_BATTERY_STATUS: NORMAL
MDC_IDC_MSMT_BATTERY_VOLTAGE: 3.13 V
MDC_IDC_MSMT_CAP_CHARGE_DTM: NORMAL
MDC_IDC_MSMT_CAP_CHARGE_ENERGY: 18 J
MDC_IDC_MSMT_CAP_CHARGE_TIME: 3.74
MDC_IDC_MSMT_CAP_CHARGE_TYPE: NORMAL
MDC_IDC_MSMT_LEADCHNL_RV_IMPEDANCE_VALUE: 361 OHM
MDC_IDC_MSMT_LEADCHNL_RV_IMPEDANCE_VALUE: 456 OHM
MDC_IDC_MSMT_LEADCHNL_RV_PACING_THRESHOLD_AMPLITUDE: 0.5 V
MDC_IDC_MSMT_LEADCHNL_RV_PACING_THRESHOLD_PULSEWIDTH: 0.4 MS
MDC_IDC_MSMT_LEADCHNL_RV_SENSING_INTR_AMPL: 14 MV
MDC_IDC_MSMT_LEADCHNL_RV_SENSING_INTR_AMPL: 14 MV
MDC_IDC_PG_IMPLANT_DTM: NORMAL
MDC_IDC_PG_MFG: NORMAL
MDC_IDC_PG_MODEL: NORMAL
MDC_IDC_PG_SERIAL: NORMAL
MDC_IDC_PG_TYPE: NORMAL
MDC_IDC_SESS_CLINIC_NAME: NORMAL
MDC_IDC_SESS_DTM: NORMAL
MDC_IDC_SESS_TYPE: NORMAL
MDC_IDC_SET_BRADY_HYSTRATE: NORMAL
MDC_IDC_SET_BRADY_LOWRATE: 40 {BEATS}/MIN
MDC_IDC_SET_BRADY_MODE: NORMAL
MDC_IDC_SET_LEADCHNL_RV_PACING_AMPLITUDE: 2 V
MDC_IDC_SET_LEADCHNL_RV_PACING_ANODE_ELECTRODE_1: NORMAL
MDC_IDC_SET_LEADCHNL_RV_PACING_ANODE_LOCATION_1: NORMAL
MDC_IDC_SET_LEADCHNL_RV_PACING_CAPTURE_MODE: NORMAL
MDC_IDC_SET_LEADCHNL_RV_PACING_CATHODE_ELECTRODE_1: NORMAL
MDC_IDC_SET_LEADCHNL_RV_PACING_CATHODE_LOCATION_1: NORMAL
MDC_IDC_SET_LEADCHNL_RV_PACING_POLARITY: NORMAL
MDC_IDC_SET_LEADCHNL_RV_PACING_PULSEWIDTH: 0.4 MS
MDC_IDC_SET_LEADCHNL_RV_SENSING_ANODE_ELECTRODE_1: NORMAL
MDC_IDC_SET_LEADCHNL_RV_SENSING_ANODE_LOCATION_1: NORMAL
MDC_IDC_SET_LEADCHNL_RV_SENSING_CATHODE_ELECTRODE_1: NORMAL
MDC_IDC_SET_LEADCHNL_RV_SENSING_CATHODE_LOCATION_1: NORMAL
MDC_IDC_SET_LEADCHNL_RV_SENSING_POLARITY: NORMAL
MDC_IDC_SET_LEADCHNL_RV_SENSING_SENSITIVITY: 0.3 MV
MDC_IDC_SET_ZONE_DETECTION_BEATS_DENOMINATOR: 40 {BEATS}
MDC_IDC_SET_ZONE_DETECTION_BEATS_NUMERATOR: 30 {BEATS}
MDC_IDC_SET_ZONE_DETECTION_INTERVAL: 270 MS
MDC_IDC_SET_ZONE_DETECTION_INTERVAL: 340 MS
MDC_IDC_SET_ZONE_DETECTION_INTERVAL: 360 MS
MDC_IDC_SET_ZONE_DETECTION_INTERVAL: NORMAL
MDC_IDC_SET_ZONE_TYPE: NORMAL
MDC_IDC_STAT_AT_BURDEN_PERCENT: 1 %
MDC_IDC_STAT_AT_DTM_END: NORMAL
MDC_IDC_STAT_AT_DTM_START: NORMAL
MDC_IDC_STAT_BRADY_DTM_END: NORMAL
MDC_IDC_STAT_BRADY_DTM_START: NORMAL
MDC_IDC_STAT_BRADY_RV_PERCENT_PACED: 0.01 %
MDC_IDC_STAT_EPISODE_RECENT_COUNT: 0
MDC_IDC_STAT_EPISODE_RECENT_COUNT: 78
MDC_IDC_STAT_EPISODE_RECENT_COUNT_DTM_END: NORMAL
MDC_IDC_STAT_EPISODE_RECENT_COUNT_DTM_START: NORMAL
MDC_IDC_STAT_EPISODE_TOTAL_COUNT: 0
MDC_IDC_STAT_EPISODE_TOTAL_COUNT: 190
MDC_IDC_STAT_EPISODE_TOTAL_COUNT_DTM_END: NORMAL
MDC_IDC_STAT_EPISODE_TOTAL_COUNT_DTM_START: NORMAL
MDC_IDC_STAT_EPISODE_TYPE: NORMAL
MDC_IDC_STAT_TACHYTHERAPY_ATP_DELIVERED_RECENT: 0
MDC_IDC_STAT_TACHYTHERAPY_ATP_DELIVERED_TOTAL: 0
MDC_IDC_STAT_TACHYTHERAPY_RECENT_DTM_END: NORMAL
MDC_IDC_STAT_TACHYTHERAPY_RECENT_DTM_START: NORMAL
MDC_IDC_STAT_TACHYTHERAPY_SHOCKS_ABORTED_RECENT: 0
MDC_IDC_STAT_TACHYTHERAPY_SHOCKS_ABORTED_TOTAL: 0
MDC_IDC_STAT_TACHYTHERAPY_SHOCKS_DELIVERED_RECENT: 0
MDC_IDC_STAT_TACHYTHERAPY_SHOCKS_DELIVERED_TOTAL: 0
MDC_IDC_STAT_TACHYTHERAPY_TOTAL_DTM_END: NORMAL
MDC_IDC_STAT_TACHYTHERAPY_TOTAL_DTM_START: NORMAL

## 2020-06-18 ENCOUNTER — CARE COORDINATION (OUTPATIENT)
Dept: CARDIOLOGY | Facility: CLINIC | Age: 65
End: 2020-06-18

## 2020-06-18 NOTE — PROGRESS NOTES
Called patient/caregiver to check in 12 weeks post discharge. Pt reports VAD parameters Current LVAD numbers, Speed: 5500, Flow: 4.5, PI: 3.4, Power: 4.6 and weight 206 (202 @ 8 wk check). Reviewed medications and answered any questions. Patient reports sleeping better and no anxiety since being home with LVAD. Patient is able to move around the house and care for himself independently.  Pt started cardiac rehab Wednesday.  MAP 82, Wednesday @ rehab.       Discussed specific new problems/stressors since being discharged from the hospital.  Empathized with patient and reviewed coping strategies: enlisting support from friends and love ones, attending patient and caregiver support groups, reviewing LVAD educational materials to reinforce knowledge, and talking about concerns with family/care providers/trusted others. Encouraged pt to page VAD Coordinator with any issues or questions. Pt verbalizes understanding.

## 2020-06-23 DIAGNOSIS — I50.22 CHRONIC SYSTOLIC CONGESTIVE HEART FAILURE (H): Primary | ICD-10-CM

## 2020-06-24 PROBLEM — I50.22 CHRONIC SYSTOLIC CONGESTIVE HEART FAILURE (H): Status: ACTIVE | Noted: 2020-06-24

## 2020-06-26 ENCOUNTER — CARE COORDINATION (OUTPATIENT)
Dept: CARDIOLOGY | Facility: CLINIC | Age: 65
End: 2020-06-26

## 2020-06-26 DIAGNOSIS — I50.22 CHRONIC SYSTOLIC CONGESTIVE HEART FAILURE (H): Primary | ICD-10-CM

## 2020-06-26 DIAGNOSIS — Z95.811 LVAD (LEFT VENTRICULAR ASSIST DEVICE) PRESENT (H): ICD-10-CM

## 2020-06-26 RX ORDER — LISINOPRIL 10 MG/1
TABLET ORAL
Qty: 90 TABLET | Refills: 3 | COMMUNITY
Start: 2020-06-26 | End: 2020-07-23

## 2020-06-26 RX ORDER — HYDRALAZINE HYDROCHLORIDE 25 MG/1
75 TABLET, FILM COATED ORAL 3 TIMES DAILY
Qty: 810 TABLET | Refills: 3 | Status: SHIPPED | OUTPATIENT
Start: 2020-06-26 | End: 2020-07-23

## 2020-06-26 NOTE — PROGRESS NOTES
D:  BP/MAP's review with Eliseo: 82, 70, 80, 84, 86 (today).  Weight slightly up today 210, from 207.  Pt did eat more salty food last night and may have drank some extra fluid yesterday.    I:  Situation discussed with Karolina MATHIAS.  Plan: increase lisinopril to 10 mg in AM & 5 mg PM.  Decrease hydral to 75 mg TID. Call for dizziness. Labs/BP review in 2 weeks @ appt 7/16.  (already ordered)  Pt informed via phone.  A:  BP/MAP, med review  P:  Pt verbalized understanding of the instructions given.  Will call VAD coordinator with further needs and questions.

## 2020-06-30 ENCOUNTER — ANTICOAGULATION THERAPY VISIT (OUTPATIENT)
Dept: ANTICOAGULATION | Facility: CLINIC | Age: 65
End: 2020-06-30

## 2020-06-30 DIAGNOSIS — I50.22 CHRONIC SYSTOLIC CONGESTIVE HEART FAILURE (H): ICD-10-CM

## 2020-06-30 DIAGNOSIS — Z95.811 LVAD (LEFT VENTRICULAR ASSIST DEVICE) PRESENT (H): ICD-10-CM

## 2020-07-01 ENCOUNTER — ANTICOAGULATION THERAPY VISIT (OUTPATIENT)
Dept: ANTICOAGULATION | Facility: CLINIC | Age: 65
End: 2020-07-01

## 2020-07-01 DIAGNOSIS — I50.22 CHRONIC SYSTOLIC CONGESTIVE HEART FAILURE (H): ICD-10-CM

## 2020-07-01 DIAGNOSIS — Z95.811 LVAD (LEFT VENTRICULAR ASSIST DEVICE) PRESENT (H): ICD-10-CM

## 2020-07-01 LAB — INR PPP: 2.7 (ref 0.9–1.1)

## 2020-07-01 NOTE — PROGRESS NOTES
ANTICOAGULATION FOLLOW-UP CLINIC VISIT    Patient Name:  Eliseo Tanner  Date:  2020  Contact Type:  Telephone    SUBJECTIVE:         OBJECTIVE    Recent labs: (last 7 days)     20   INR 2.7*       ASSESSMENT / PLAN  No question data found.  Anticoagulation Summary  As of 2020    INR goal:   2.0-3.0   TTR:   85.2 % (3.2 mo)   INR used for dosin.7 (2020)   Warfarin maintenance plan:   4 mg (4 mg x 1) every day   Full warfarin instructions:   4 mg every day   Weekly warfarin total:   28 mg   No change documented:   Gloria Abbasi, RN   Plan last modified:   Krysta Mcdaniel RN (2020)   Next INR check:   7/15/2020   Priority:   Critical   Target end date:   Indefinite    Indications    LVAD (left ventricular assist device) present (H) [Z95.811]  Chronic systolic congestive heart failure (H) [I50.22]             Anticoagulation Episode Summary     INR check location:       Preferred lab:       Send INR reminders to:    [facility]    Comments:   Patient on Amiodarone +++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 RICHARDS's NOTE 2/15/20++++  HM 3  placed 2020, 81mg ASA, Speak to spouse, Veronique at [phone]  2020:  Lab: [facility]:  ph: [phone] opt 5;   fax: [phone]      Anticoagulation Care Providers     Provider Role Specialty Phone number    Gucci Tomas MD Referring Cardiology [phone]    Nader Cisneros MD Referring Cardiology [phone]            See the Encounter Report to view Anticoagulation Flowsheet and Dosing Calendar (Go to Encounters tab in chart review, and find the Anticoagulation Therapy Visit)    Spoke with patient.     Gloria Abbasi, RN

## 2020-07-10 ENCOUNTER — CARE COORDINATION (OUTPATIENT)
Dept: CARDIOLOGY | Facility: CLINIC | Age: 65
End: 2020-07-10

## 2020-07-10 RX ORDER — ZOLPIDEM TARTRATE 5 MG/1
5 TABLET ORAL
COMMUNITY
Start: 2020-07-10 | End: 2020-07-14

## 2020-07-10 NOTE — PROGRESS NOTES
D:  Pt called in to report MAP's. Starting 6/22: 80, 84, 86, 74, 80, 78, 74, 84. Pt denies dizziness or lightheadedness.   I:  Situation discussed with Karolina MATHIAS.  Plan: No change as this time.  A:  Improving BP's  P:  Pt verbalized understanding of the instructions given.  Will call VAD coordinator with further needs and questions.      Patient check in 16 weeks post discharge. Pt reports VAD parameters Current LVAD numbers, Speed: 5500, Flow: 4.6, PI: 3.4, Power: 4.7 and weight 206. Reviewed medications and answered any questions. Patient reports sleeping better.  Pt went to primary and was started on melatonin and Ambien.  Patient is able to move around the house and care for himself independently.     Encouraged pt to page VAD Coordinator with any issues or questions. Pt verbalizes understanding.

## 2020-07-10 NOTE — LETTER
Patient Name: Eliseo Tanner   : 1955   Diagnosis/ICD-10: Heart Failure, unspecified I50.9; LVAD Z95.811   Requesting Physician: Dr. Nader Cisneros   Date of Request:    Date to Draw 2020     **Please fax results to Gladys VANESSA RN VAD Coord at [phone]. Call [phone] with Questions    ORDER CODE TESTS NANCY.VOL.    CBASIC Na, K, Cl, CO2, Crea, BUN, Glu, Ca GG 0.5-1    BHEPAT Alb, AlkP, ALT, AST, BBil, TP GG 0.5-1    BLIP Chol, Trig, HDL, LDL GG 1-2   X CCOMP Na, K, Cl, CO2, Crea, BUN, Glu, Ca, Alb, AlkP, ALT, AST, Bbil, TP,  GG 0.6-1   X HGP CBC & Platelet P 0.3-1    HGDP CBC, Differential & Platelet P 0.3-1    PLT Platelet  P 0.3-1   X INR INR B 2.7    PTT PTT B 2.7   X LD Lactate Dehydrogenase GG 0.3-1    PHGB Plasma Hemoglobin GG 2-3    Pre-Albumin Pre-Albumin RG 1.0   X  D Dimer             Signed,      Nader Cisneros MD  Heart Failure, Mechanical Circulatory Support and Transplant Cardiology   of Medicine,  Division of Cardiology, [facility]

## 2020-07-11 ENCOUNTER — CARE COORDINATION (OUTPATIENT)
Dept: CARDIOLOGY | Facility: CLINIC | Age: 65
End: 2020-07-11

## 2020-07-11 NOTE — PROGRESS NOTES
D:  Received call from pt's wife reporting pt took 3 10mg pills of lisinopril instead of 3 25mg hydralazine pills.  I:  Instructed pt to stay at home today and not do any physical activity.  Do not take any more hydralazine or lisinopril today.  Make sure he is sitting or laying down if any dizziness.  Call back if he becomes symptomatic.  P:  Pt and wife verbalized understanding.

## 2020-07-13 ENCOUNTER — EXTERNAL ORDER RESULTS (OUTPATIENT)
Dept: CARDIOLOGY | Facility: CLINIC | Age: 65
End: 2020-07-13

## 2020-07-13 LAB
ALBUMIN SERPL-MCNC: 4.2 G/DL
ALP SERPL-CCNC: 103 U/L
ALT SERPL-CCNC: 52 U/L
ANION GAP SERPL CALCULATED.3IONS-SCNC: 8 MMOL/L
AST SERPL-CCNC: 47 U/L
BILIRUB SERPL-MCNC: 0.4 MG/DL
BUN SERPL-MCNC: 44 MG/DL
CALCIUM SERPL-MCNC: 8.9 MG/DL
CHLORIDE SERPLBLD-SCNC: 105 MMOL/L
CO2 SERPL-SCNC: 24 MMOL/L
CREAT SERPL-MCNC: 1.8 MG/DL
D DIMER PPP FEU-MCNC: 2200 NG/DL
ERYTHROCYTE [DISTWIDTH] IN BLOOD BY AUTOMATED COUNT: 20.4 %
GFR SERPL CREATININE-BSD FRML MDRD: 40 ML/MIN/1.73M2
GLUCOSE SERPL-MCNC: 191 MG/DL (ref 70–99)
HCT VFR BLD AUTO: 36 %
HEMOGLOBIN: 10.9 G/DL (ref 13.3–17.7)
LDH SERPL-CCNC: 316 U/L
MCH RBC QN AUTO: 21.1 PG
MCHC RBC AUTO-ENTMCNC: 30.1 G/DL
MCV RBC AUTO: 70 FL
PLATELET # BLD AUTO: 382 10^9/L
POTASSIUM SERPL-SCNC: 4.5 MMOL/L
PROT SERPL-MCNC: 7.9 G/DL
RBC # BLD AUTO: 5.14 10^12/L
SODIUM SERPL-SCNC: 137 MMOL/L
WBC # BLD AUTO: 7 10^9/L

## 2020-07-14 ENCOUNTER — VIRTUAL VISIT (OUTPATIENT)
Dept: CARDIOLOGY | Facility: CLINIC | Age: 65
End: 2020-07-14
Attending: INTERNAL MEDICINE
Payer: MEDICARE

## 2020-07-14 DIAGNOSIS — Z95.811 LVAD (LEFT VENTRICULAR ASSIST DEVICE) PRESENT (H): ICD-10-CM

## 2020-07-14 PROCEDURE — 99213 OFFICE O/P EST LOW 20 MIN: CPT | Mod: 95 | Performed by: PHYSICIAN ASSISTANT

## 2020-07-14 RX ORDER — FUROSEMIDE 20 MG
20 TABLET ORAL DAILY
Qty: 90 TABLET | Refills: 3 | Status: SHIPPED | OUTPATIENT
Start: 2020-07-14 | End: 2020-07-21

## 2020-07-14 RX ORDER — POTASSIUM CHLORIDE 750 MG/1
10 TABLET, EXTENDED RELEASE ORAL DAILY
Qty: 30 TABLET | Status: ON HOLD
Start: 2020-07-14 | End: 2020-11-15

## 2020-07-14 ASSESSMENT — PAIN SCALES - GENERAL: PAINLEVEL: NO PAIN (0)

## 2020-07-14 NOTE — PATIENT INSTRUCTIONS
Medication changes  - Decrease lasix to 20 mg once a day  - Decrease Potassium to 10 meq once a day    Follow-up  - Labs and weight check in one week  - Dr. Cisneros in August as scheduled

## 2020-07-14 NOTE — LETTER
Patient Name: Eliseo Tanner   : 1955   Diagnosis/ICD-10: Heart Failure, unspecified I50.9; LVAD Z95.811   Requesting Physician: Dr. Nader Cisneros   Date of Request:    Date to Draw 2020     **Please fax results to Gladys VANESSA RN VAD Coord at [phone]. Call [phone] with Questions    ORDER CODE TESTS NANCY.VOL.   X CBASIC Na, K, Cl, CO2, Crea, BUN, Glu, Ca GG 0.5-1    BHEPAT Alb, AlkP, ALT, AST, BBil, TP GG 0.5-1    BLIP Chol, Trig, HDL, LDL GG 1-2    CCOMP Na, K, Cl, CO2, Crea, BUN, Glu, Ca, Alb, AlkP, ALT, AST, Bbil, TP,  GG 0.6-1    HGP CBC & Platelet P 0.3-1    HGDP CBC, Differential & Platelet P 0.3-1    PLT Platelet  P 0.3-1    INR INR B 2.7    PTT PTT B 2.7    LD Lactate Dehydrogenase GG 0.3-1    PHGB Plasma Hemoglobin GG 2-3    Pre-Albumin Pre-Albumin RG 1.0     D Dimer             Signed,      Nader Cisneros MD  Heart Failure, Mechanical Circulatory Support and Transplant Cardiology   of Medicine,  Division of Cardiology, [facility]

## 2020-07-14 NOTE — LETTER
"7/14/2020      RE: Eliseo Tanner  [address] [facility] [place] [address]       Dear Colleague,    Thank you for the opportunity to participate in the care of your patient, Eliseo Tanner, at the [facility]. Please see a copy of my visit note below.    Eliseo Tanenr is a 65 year old male who is being evaluated via a billable video visit.      The patient has been notified of following:     \"This video visit will be conducted via a call between you and your physician/provider. We have found that certain health care needs can be provided without the need for an in-person physical exam.  This service lets us provide the care you need with a video conversation.  If a prescription is necessary we can send it directly to your pharmacy.  If lab work is needed we can place an order for that and you can then stop by our lab to have the test done at a later time.    Video visits are billed at different rates depending on your insurance coverage.  Please reach out to your insurance provider with any questions.    If during the course of the call the physician/provider feels a video visit is not appropriate, you will not be charged for this service.\"    Patient has given verbal consent for Video visit? Yes  How would you like to obtain your AVS? MyChart  Patient would like the video invitation sent by: Text to cell phone: [phone]  Will anyone else be joining your video visit? Yes: Wife. How would they like to receive their invitation? Text to cell phone: [phone]      Vitals - Patient Reported 7/14/2020   Height (Patient Reported) 5' 7.5\"   Weight (Patient Reported) 208 lb   BMI (Based on Pt Reported Ht/Wt) 32.1 kg/m2   Medications were reconciled.     Lauren Grayson CMA    1:09 PM      Amwell visit  Duration: 18 minutes  Patient location: home  Provider [place] [facility] [place]    HPI:   Eliseo Tanner is a 64 year old male with chronic systolic " heart failure secondary to NICM now s/p HM 3 on 2/18, moderate CAD, HTN, ABHINAV on CPAP, DM2, CKD Stage III, ANA. His HM3 post-op course was complicated by retrosternal hematoma and bleeding in the lungs, RV failure,VT in ICU now on amiodarone and Afib w/AVR S/p DCCV on 2/28. He had pre-op proteus and enterococcus bacteremia from 2/13, s/p abx. He had an ICD placed on 3/16/2020. He presents today for an inperson visit for LVAD follow-up.    Last visit 5/19/2020 with Mervat Decker  Weight 203-207. Gradually increasing. Watching BPS.    This visit  He has been feeling pretty well. He started cardiac rehab 4 weeks ago and feels like he is making good progress. No SOB at rest. He can do 15 minutes on the Nustep and then move on to the tredmill and doesn't feel dyspnic with this. No LE edema, abdominal edema, orthopnea or PND. No chest pain, palpitations, pre-syncope or syncope. He does have some mild lightheadedness with position changes. Appetite is fine.    No blood in the urine, blood in the stool or prolonged nosebleeds.    No fevers or chills, driveline redness, drainage or pain.    No headaches or stroke symptoms.    Cardiac Medications  ASA 81 mg daily  Coumadin  Lipitor 20 mg daily 20 mg daily  Lisinopril 10/5  Amiodarone 200 mg daily  Digoxin 125 mcg daily  Lasix 20 mg daily  Kcl 10 meq daily  Hydralazine 75 mg daily    PAST MEDICAL HISTORY:  No past medical history on file.    FAMILY HISTORY:  No family history on file.    SOCIAL HISTORY:  Social History     Socioeconomic History     Marital status:      Spouse name: Not on file     Number of children: Not on file     Years of education: Not on file     Highest education level: Not on file   Occupational History     Not on file   Social Needs     Financial resource strain: Not on file     Food insecurity     Worry: Not on file     Inability: Not on file     Transportation needs     Medical: Not on file     Non-medical: Not on file   Tobacco Use      Smoking status: Not on file   Substance and Sexual Activity     Alcohol use: Not on file     Drug use: Not on file     Sexual activity: Not on file   Lifestyle     Physical activity     Days per week: Not on file     Minutes per session: Not on file     Stress: Not on file   Relationships     Social connections     Talks on phone: Not on file     Gets together: Not on file     Attends Shinto service: Not on file     Active member of club or organization: Not on file     Attends meetings of clubs or organizations: Not on file     Relationship status: Not on file     Intimate partner violence     Fear of current or ex partner: Not on file     Emotionally abused: Not on file     Physically abused: Not on file     Forced sexual activity: Not on file   Other Topics Concern     Not on file   Social History Narrative     Not on file       CURRENT MEDICATIONS:  acetaminophen (TYLENOL) 325 MG tablet, Take 2 tablets (650 mg) by mouth every 4 hours as needed for mild pain or fever  albuterol (PROAIR HFA/PROVENTIL HFA/VENTOLIN HFA) 108 (90 Base) MCG/ACT inhaler, Inhale 2 puffs into the lungs every 6 hours as needed for wheezing (Patient not taking: Reported on 2020)  allopurinol (ZYLOPRIM) 100 MG tablet, 2 tablets (200 mg) by Oral or Feeding Tube route daily  amiodarone (PACERONE) 200 MG tablet, Take 1 tablet (200 mg) by mouth daily  aspirin (ASA) 81 MG EC tablet, Take 1 tablet (81 mg) by mouth daily  atorvastatin (LIPITOR) 20 MG tablet, 1 tablet (20 mg) by Oral or Feeding Tube route every evening  [] cephALEXin (KEFLEX) 500 MG capsule, Take 1 capsule (500 mg) by mouth 3 times daily for 5 days  [] cephalexin 500 MG tablet, Take 500 mg by mouth 3 times daily for 5 days  co-enzyme Q-10 200 MG CAPS, 200 mg by Oral or Feeding Tube route daily  diclofenac (VOLTAREN) 1 % topical gel, Apply 4 g topically 4 times daily as needed for moderate pain (Patient not taking: Reported on 2020)  digoxin (LANOXIN) 125  MCG tablet, Take 1 tablet (125 mcg) by mouth daily  FLUoxetine (PROZAC) 20 MG capsule, Take 1 capsule (20 mg) by mouth daily  furosemide (LASIX) 20 MG tablet, Take 2 tablets (40 mg) by mouth daily  hydrALAZINE (APRESOLINE) 25 MG tablet, Take 3 tablets (75 mg) by mouth 3 times daily  insulin glargine (LANTUS PEN) 100 UNIT/ML pen, Inject 10 Units Subcutaneous At Bedtime  insulin pen needle (BD JAIME U/F) 32G X 4 MM miscellaneous,   lisinopril (ZESTRIL) 10 MG tablet, Take 10 mg in the morning and 5 mg in the evening  magnesium oxide (MAG-OX) 400 MG tablet, 1 tablet (400 mg) by Oral or Feeding Tube route daily  melatonin 5 MG tablet, Take 2 tablets (10 mg) by mouth nightly as needed for sleep  methocarbamol (ROBAXIN) 500 MG tablet, Take 1 tablet (500 mg) by mouth 4 times daily as needed for muscle spasms  MULTIPLE MINERALS-VITAMINS PO, Take 1 tablet by mouth daily  multivitamin w/minerals (THERA-VIT-M) tablet, Take 1 tablet by mouth daily  ondansetron (ZOFRAN) 4 MG tablet, Take 4 mg by mouth every 4 hours as needed for nausea  pantoprazole (PROTONIX) 40 MG EC tablet, Take 1 tablet (40 mg) by mouth every morning (before breakfast)  potassium chloride ER (KLOR-CON M) 20 MEQ CR tablet, Take 1 tablet (20 mEq) by mouth daily  warfarin ANTICOAGULANT (COUMADIN) 4 MG tablet, Take 4-8mg daily or as directed by the coumadin clinic  zolpidem (AMBIEN) 5 MG tablet, Take 1 tablet (5 mg) by mouth nightly as needed for sleep    No current facility-administered medications on file prior to visit.       ROS:   CONSTITUTIONAL: Denies fever, chills, fatigue.  HEENT: Denies vision changes, and changes in speech.   CV: Refer to HPI.   PULMONARY:Refer to HPI.   GI:Denies nausea, vomiting, diarrhea, and abdominal pain. Bowel movements are regular.   :Denies urinary alterations, dysuria, urinary frequency, hematuria, and abnormal drainage.   EXT:See HPI  SKIN:Denies abnormal rashes or lesions.   MUSCULOSKELETAL:Denies upper or lower extremity  weakness and pain.   NEUROLOGIC:Denies seizures, or upper or lower extremity paresthesia.     EXAM:  There were no vitals taken for this visit.    Constitutional - no pallor, energy level is good, no obvious pain.  Posture normal.      Eyes - No redness, anicteric      Respiratory - calm, regular breathing, normal respiratory effort, no cough noted      Skin - no pallor, no rash noted on face      Psychiatric - calm affect, and interacting appropriately    The rest of a comprehensive physical examination is deferred due to PHE (public health emergency) video visit restrictions      Labs - as reviewed in clinic with patient today:  CBC RESULTS:  Lab Results   Component Value Date    WBC 7.0 07/13/2020    RBC 5.14 07/13/2020    HGB 10.9 (A) 07/13/2020    HCT 36 07/13/2020    MCV 70 07/13/2020    MCH 21.1 07/13/2020    MCHC 30.1 07/13/2020    RDW 20.4 07/13/2020     07/13/2020       CMP RESULTS:  Lab Results   Component Value Date     07/13/2020    POTASSIUM 4.5 07/13/2020    CHLORIDE 105 07/13/2020    CO2 24 07/13/2020    ANIONGAP 8 07/13/2020     (A) 07/13/2020    BUN 44 07/13/2020    CR 1.8 07/13/2020    GFRESTIMATED 40 07/13/2020    GFRESTBLACK 66 04/14/2020    CALOS 8.9 07/13/2020    BILITOTAL 0.4 07/13/2020    ALBUMIN 4.2 07/13/2020    ALKPHOS 103 07/13/2020    ALT 52 07/13/2020    AST 47 07/13/2020        INR RESULTS:  Lab Results   Component Value Date    INR 2.7 (A) 07/01/2020       Lab Results   Component Value Date    MAG 1.9 04/08/2020     Lab Results   Component Value Date    NTBNPI 12,559 (H) 02/08/2020     No results found for: NTBNP    Diagnostics    ICK check 6/15/2020  In clinic device appointment converted to remote device appointment to minimize COVID19 exposure for high risk patient populations. Patient has an appt with STEW Aguilar on 5/16/2020. Remote ICD transmission received and reviewed. Device transmission sent per MD orders. Patient has a Medtronic single lead ICD.  Normal ICD function. 78 AT/AF episodes recorded - < 1 min - 1 hour 6 min in duration. AF burden = 1%. Patient is taking Warfarin. Presenting EGM = irregular ventricular rhythm @ ~ 100 bpm.  = <0.1%. OptiVol fluid index is near baseline. Estimated battery longevity to PERLA = 11.3 years. No short v-v intervals recorded. Lead trends appear stable. Patient notified of interrogation results. Patient reports that he is feeling well and denies specific complaints. Plan for patient to return to clinic on 8/26/2020 as scheduled.      Remote ICD transmission    ICD Check 4/14/2020  Medtronic, single lead ICD,  remote transmission received and reviewed. Device transmission sent per MD orders. In clinic device appointment conversion to remote device appointment to minimize COVID19 exposure for high risk patient populations. Pt reports he his the speed of his LVAD changed today and he hasn't felt well since then. His presenting rhythm is a regular ventricular rhythm @ 125 bpm. The morphology is similar to his previous intrinsic tracings. No arrhythmias recorded. Normal device function.  <0.1%. No short V-V intervals recorded. Lead trends appear stable. OptiVol thoracic impedance is establishing his baseline. Battery estimates 11.4 years to PERLA. Pt and Karolina Decker PA-C, notified of transmission results.  KRISTEN Dumont.     Remote ICD transmission.    CXR 3/9/2020:  1.  Stable supportive lines and tubes.  2.  Cardiomegaly with LVAD placement, stable.  3.  Right apical hemothorax evacuation. Right mid/lower lobe  subsegmental atelectasis, overall stable.     CT Chest 3/4/2020:  1. Grossly stable large right apical hemothorax compared to recent  radiographs. Tiny nondisplaced fractures of the medial right first and  second ribs. Tiny right pneumothorax.   2. Intrafissural position of the right basilar chest tube abutting the  pericardium and with the tip immediately inferior to the right hilar  vasculature.   3. Small left  pleural effusion, which also contains hyperdense fluid  suspicious for small component of left-sided hemothorax.  4. Stable retrosternal hematoma.  5. Cardiomegaly with small pericardial effusion versus  hemopericardium. Stable LVAD and pericardial drain.     Echo 2/25/2020:  Left ventricular size is normal. Severely reduced left ventricular systolic  function. Septum is midline  LVAD inflow or outflow cannulae not visualized. Doppler velocities are not  elevated.  Moderate to severely reduced right ventricular systolic function.  Aortic valve appears closed during the entire cardiac cycle. Trace aortic  insufficiency is present.  IVC is plethoric.  No pericardial effusion present.    Assessment and Plan:   Eliseo Tanner is a 65 year old male with chronic systolic heart failure secondary to NICM now s/p HM 3 on 2/18, moderate CAD, HTN, ABHINAV on CPAP, DM2, CKD Stage III, ANA. His HM3 post-op course was complicated by retrosternal hematoma, RV failure,VT in ICU now on amiodarone and Afib w/AVR S/p DCCV on 2/28. He had pre-op proteus and enterococcus bacteremia from 2/13, s/p abx. He had an ICD placed on 3/16/2020.  He presents today for routine clinic visit via video given ongoing COVID 19 outbreak.    He has been feeling well. Gaining some gradual weight, but no hypervolemia symptoms. Infact, he sounds more dry. He has started cardiac rehab a few weeks ago and feels very goodtwith this.     Unfortuantly, Cr continues to rise. We will back off on diuretics and recheck.      MAPS have been 75-85 at cardiac rehab.      Chronic SCHF secondary to NICM s/p HM III with RV dysfunction. Implanted 2/18 and complicated by bleeding.   Stage D, NYHA Class IIIA  ACEi/ARB Lisinopril 10/5 Hydralazine 75 mg po TID.   BB Has been deferred given RHF, may be able to retrial in the next coming visits  Aldosterone antagonist deferred while other medical therapy is prioritized  SCD prophylaxis ICD  Fluid status Euvolemic: Decrease  lasix to 20 mg daily and Kcl 10 meq daily  MAP: Goal 65-85  LDH: needs recheck  Anticoagulation: Coumadin per pharmacy.  Goal 2-3.  Antiplatelet: ASA 81 mg po daily  Speed: Did not tolerate a speed increase to 5600 on a prior attempt  RV support: Continue Digoxin    AoCKD  Cr up to 1.8 from BL of 1.3-1.5. Did not improve with increased diuretic challenge. Decreased diureitics today as above    VAD interrogation today: N/A video visit, but no alarms per patient    HTN.  Will continue transitioning hydralazine to lisinopril once Cr improves  - Continue Lisinopril   - Continue hydralazine     New Onset Afib s/p DCCV/history of Aflutter. History of Afib w/ RVR and aberrancy vs. NATHALIA vs. AT vs. Slow VT. S/p DCCV on 2/28 back into sinus rhythm.  - Coumadin as above.   - Continue Digoxin.     Follow up   - BMP and LDH in 1 week (LHD not draw, Cr up, decreased diuretics) and weight check  - Dr. Cisneros and EP as scheduled in August      BIJAL Padron TAMAS

## 2020-07-14 NOTE — LETTER
Patient Name: Eliseo Tanner   : 1955   Diagnosis/ICD-10: Heart Failure, unspecified I50.9; LVAD Z95.811   Requesting Physician: Dr. Nader Cisneros   Date of Request:    Date to Draw 2020     **Please fax results to Gladys VANESSA RN VAD Coord at [phone]. Call [phone] with Questions    ORDER CODE TESTS NANCY.VOL.   X CBASIC Na, K, Cl, CO2, Crea, BUN, Glu, Ca GG 0.5-1    BHEPAT Alb, AlkP, ALT, AST, BBil, TP GG 0.5-1    BLIP Chol, Trig, HDL, LDL GG 1-2    CCOMP Na, K, Cl, CO2, Crea, BUN, Glu, Ca, Alb, AlkP, ALT, AST, Bbil, TP,  GG 0.6-1    HGP CBC & Platelet P 0.3-1    HGDP CBC, Differential & Platelet P 0.3-1    PLT Platelet  P 0.3-1    INR INR B 2.7    PTT PTT B 2.7   X LD Lactate Dehydrogenase GG 0.3-1    PHGB Plasma Hemoglobin GG 2-3    Pre-Albumin Pre-Albumin RG 1.0     D Dimer             Signed,      Nader Cisneros MD  Heart Failure, Mechanical Circulatory Support and Transplant Cardiology   of Medicine,  Division of Cardiology, [facility]

## 2020-07-14 NOTE — PROGRESS NOTES
"Eliseo Tanner is a 65 year old male who is being evaluated via a billable video visit.      The patient has been notified of following:     \"This video visit will be conducted via a call between you and your physician/provider. We have found that certain health care needs can be provided without the need for an in-person physical exam.  This service lets us provide the care you need with a video conversation.  If a prescription is necessary we can send it directly to your pharmacy.  If lab work is needed we can place an order for that and you can then stop by our lab to have the test done at a later time.    Video visits are billed at different rates depending on your insurance coverage.  Please reach out to your insurance provider with any questions.    If during the course of the call the physician/provider feels a video visit is not appropriate, you will not be charged for this service.\"    Patient has given verbal consent for Video visit? Yes  How would you like to obtain your AVS? Maria C  Patient would like the video invitation sent by: Text to cell phone: [phone]  Will anyone else be joining your video visit? Yes: Wife. How would they like to receive their invitation? Text to cell phone: [phone]      Vitals - Patient Reported 7/14/2020   Height (Patient Reported) 5' 7.5\"   Weight (Patient Reported) 208 lb   BMI (Based on Pt Reported Ht/Wt) 32.1 kg/m2         Medications were reconciled.     Lauren Grayson CMA    1:09 PM                  "

## 2020-07-15 ENCOUNTER — ANTICOAGULATION THERAPY VISIT (OUTPATIENT)
Dept: ANTICOAGULATION | Facility: CLINIC | Age: 65
End: 2020-07-15

## 2020-07-15 DIAGNOSIS — Z95.811 LVAD (LEFT VENTRICULAR ASSIST DEVICE) PRESENT (H): ICD-10-CM

## 2020-07-15 DIAGNOSIS — I50.22 CHRONIC SYSTOLIC CONGESTIVE HEART FAILURE (H): ICD-10-CM

## 2020-07-15 LAB — INR PPP: 2.8 (ref 0.9–1.1)

## 2020-07-15 NOTE — PROGRESS NOTES
ANTICOAGULATION FOLLOW-UP CLINIC VISIT    Patient Name:  Eliseo Tanner  Date:  7/15/2020  Contact Type:  Telephone    SUBJECTIVE:         OBJECTIVE    Recent labs: (last 7 days)     20   INR 2.8*       ASSESSMENT / PLAN  INR assessment THER    Recheck INR In: 2 WEEKS    INR Location Outside lab      Anticoagulation Summary  As of 7/15/2020    INR goal:   2.0-3.0   TTR:   86.9 % (3.6 mo)   INR used for dosin.8 (2020)   Warfarin maintenance plan:   4 mg (4 mg x 1) every day   Full warfarin instructions:   4 mg every day   Weekly warfarin total:   28 mg   No change documented:   Vito Olson, [facility]   Plan last modified:   Krysta Mcdaniel, RN (2020)   Next INR check:   2020   Priority:   Critical   Target end date:   Indefinite    Indications    LVAD (left ventricular assist device) present (H) [Z95.811]  Chronic systolic congestive heart failure (H) [I50.22]             Anticoagulation Episode Summary     INR check location:       Preferred lab:       Send INR reminders to:   [facility]    Comments:   Patient on Amiodarone +++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 RICHARDS's NOTE 2/15/20++++  HM 3  placed 2020, 81mg ASA, Speak to spouse, Veronique at [phone]  2020:  Lab: [facility]:  ph: [phone] opt 5;   fax: [phone]      Anticoagulation Care Providers     Provider Role Specialty Phone number    Gucci Tomas MD Referring Cardiology [phone]    Nader Cisneros MD Referring Cardiology [phone]            See the Encounter Report to view Anticoagulation Flowsheet and Dosing Calendar (Go to Encounters tab in chart review, and find the Anticoagulation Therapy Visit)    Left message for patient with results and dosing recommendations. Asked patient to call back to report any missed doses, falls, signs and symptoms of bleeding or clotting, any changes in health, medication, or diet. Asked patient to call back with any questions  or concerns.      Vito Olson, [facility]

## 2020-07-17 NOTE — PROGRESS NOTES
Marlen visit  Duration: 18 minutes  Patient [place]  [place] [facility] [place]:   Eliseo Tanner is a 64 year old male with chronic systolic heart failure secondary to NICM now s/p HM 3 on 2/18, moderate CAD, HTN, ABHINAV on CPAP, DM2, CKD Stage III, ANA. His HM3 post-op course was complicated by retrosternal hematoma and bleeding in the lungs, RV failure,VT in ICU now on amiodarone and Afib w/AVR S/p DCCV on 2/28. He had pre-op proteus and enterococcus bacteremia from 2/13, s/p abx. He had an ICD placed on 3/16/2020. He presents today for an inperson visit for LVAD follow-up.    Last visit 5/19/2020 with Mervat Nikolaypradip  Weight 203-207. Gradually increasing. Watching BPS.    This visit  He has been feeling pretty well. He started cardiac rehab 4 weeks ago and feels like he is making good progress. No SOB at rest. He can do 15 minutes on the Nustep and then move on to the tredmill and doesn't feel dyspnic with this. No LE edema, abdominal edema, orthopnea or PND. No chest pain, palpitations, pre-syncope or syncope. He does have some mild lightheadedness with position changes. Appetite is fine.    No blood in the urine, blood in the stool or prolonged nosebleeds.    No fevers or chills, driveline redness, drainage or pain.    No headaches or stroke symptoms.    Cardiac Medications  ASA 81 mg daily  Coumadin  Lipitor 20 mg daily 20 mg daily  Lisinopril 10/5  Amiodarone 200 mg daily  Digoxin 125 mcg daily  Lasix 20 mg daily  Kcl 10 meq daily  Hydralazine 75 mg daily    PAST MEDICAL HISTORY:  No past medical history on file.    FAMILY HISTORY:  No family history on file.    SOCIAL HISTORY:  Social History     Socioeconomic History     Marital status:      Spouse name: Not on file     Number of children: Not on file     Years of education: Not on file     Highest education level: Not on file   Occupational History     Not on file   Social Needs     Financial resource strain: Not on file     Food  insecurity     Worry: Not on file     Inability: Not on file     Transportation needs     Medical: Not on file     Non-medical: Not on file   Tobacco Use     Smoking status: Not on file   Substance and Sexual Activity     Alcohol use: Not on file     Drug use: Not on file     Sexual activity: Not on file   Lifestyle     Physical activity     Days per week: Not on file     Minutes per session: Not on file     Stress: Not on file   Relationships     Social connections     Talks on phone: Not on file     Gets together: Not on file     Attends Islam service: Not on file     Active member of club or organization: Not on file     Attends meetings of clubs or organizations: Not on file     Relationship status: Not on file     Intimate partner violence     Fear of current or ex partner: Not on file     Emotionally abused: Not on file     Physically abused: Not on file     Forced sexual activity: Not on file   Other Topics Concern     Not on file   Social History Narrative     Not on file       CURRENT MEDICATIONS:  acetaminophen (TYLENOL) 325 MG tablet, Take 2 tablets (650 mg) by mouth every 4 hours as needed for mild pain or fever  albuterol (PROAIR HFA/PROVENTIL HFA/VENTOLIN HFA) 108 (90 Base) MCG/ACT inhaler, Inhale 2 puffs into the lungs every 6 hours as needed for wheezing (Patient not taking: Reported on 2020)  allopurinol (ZYLOPRIM) 100 MG tablet, 2 tablets (200 mg) by Oral or Feeding Tube route daily  amiodarone (PACERONE) 200 MG tablet, Take 1 tablet (200 mg) by mouth daily  aspirin (ASA) 81 MG EC tablet, Take 1 tablet (81 mg) by mouth daily  atorvastatin (LIPITOR) 20 MG tablet, 1 tablet (20 mg) by Oral or Feeding Tube route every evening  [] cephALEXin (KEFLEX) 500 MG capsule, Take 1 capsule (500 mg) by mouth 3 times daily for 5 days  [] cephalexin 500 MG tablet, Take 500 mg by mouth 3 times daily for 5 days  co-enzyme Q-10 200 MG CAPS, 200 mg by Oral or Feeding Tube route daily  diclofenac  (VOLTAREN) 1 % topical gel, Apply 4 g topically 4 times daily as needed for moderate pain (Patient not taking: Reported on 6/16/2020)  digoxin (LANOXIN) 125 MCG tablet, Take 1 tablet (125 mcg) by mouth daily  FLUoxetine (PROZAC) 20 MG capsule, Take 1 capsule (20 mg) by mouth daily  furosemide (LASIX) 20 MG tablet, Take 2 tablets (40 mg) by mouth daily  hydrALAZINE (APRESOLINE) 25 MG tablet, Take 3 tablets (75 mg) by mouth 3 times daily  insulin glargine (LANTUS PEN) 100 UNIT/ML pen, Inject 10 Units Subcutaneous At Bedtime  insulin pen needle (BD JAIME U/F) 32G X 4 MM miscellaneous,   lisinopril (ZESTRIL) 10 MG tablet, Take 10 mg in the morning and 5 mg in the evening  magnesium oxide (MAG-OX) 400 MG tablet, 1 tablet (400 mg) by Oral or Feeding Tube route daily  melatonin 5 MG tablet, Take 2 tablets (10 mg) by mouth nightly as needed for sleep  methocarbamol (ROBAXIN) 500 MG tablet, Take 1 tablet (500 mg) by mouth 4 times daily as needed for muscle spasms  MULTIPLE MINERALS-VITAMINS PO, Take 1 tablet by mouth daily  multivitamin w/minerals (THERA-VIT-M) tablet, Take 1 tablet by mouth daily  ondansetron (ZOFRAN) 4 MG tablet, Take 4 mg by mouth every 4 hours as needed for nausea  pantoprazole (PROTONIX) 40 MG EC tablet, Take 1 tablet (40 mg) by mouth every morning (before breakfast)  potassium chloride ER (KLOR-CON M) 20 MEQ CR tablet, Take 1 tablet (20 mEq) by mouth daily  warfarin ANTICOAGULANT (COUMADIN) 4 MG tablet, Take 4-8mg daily or as directed by the coumadin clinic  zolpidem (AMBIEN) 5 MG tablet, Take 1 tablet (5 mg) by mouth nightly as needed for sleep    No current facility-administered medications on file prior to visit.       ROS:   CONSTITUTIONAL: Denies fever, chills, fatigue.  HEENT: Denies vision changes, and changes in speech.   CV: Refer to HPI.   PULMONARY:Refer to HPI.   GI:Denies nausea, vomiting, diarrhea, and abdominal pain. Bowel movements are regular.   :Denies urinary alterations, dysuria,  urinary frequency, hematuria, and abnormal drainage.   EXT:See HPI  SKIN:Denies abnormal rashes or lesions.   MUSCULOSKELETAL:Denies upper or lower extremity weakness and pain.   NEUROLOGIC:Denies seizures, or upper or lower extremity paresthesia.     EXAM:  There were no vitals taken for this visit.    Constitutional - no pallor, energy level is good, no obvious pain.  Posture normal.      Eyes - No redness, anicteric      Respiratory - calm, regular breathing, normal respiratory effort, no cough noted      Skin - no pallor, no rash noted on face      Psychiatric - calm affect, and interacting appropriately    The rest of a comprehensive physical examination is deferred due to PHE (public health emergency) video visit restrictions      Labs - as reviewed in clinic with patient today:  CBC RESULTS:  Lab Results   Component Value Date    WBC 7.0 07/13/2020    RBC 5.14 07/13/2020    HGB 10.9 (A) 07/13/2020    HCT 36 07/13/2020    MCV 70 07/13/2020    MCH 21.1 07/13/2020    MCHC 30.1 07/13/2020    RDW 20.4 07/13/2020     07/13/2020       CMP RESULTS:  Lab Results   Component Value Date     07/13/2020    POTASSIUM 4.5 07/13/2020    CHLORIDE 105 07/13/2020    CO2 24 07/13/2020    ANIONGAP 8 07/13/2020     (A) 07/13/2020    BUN 44 07/13/2020    CR 1.8 07/13/2020    GFRESTIMATED 40 07/13/2020    GFRESTBLACK 66 04/14/2020    CALOS 8.9 07/13/2020    BILITOTAL 0.4 07/13/2020    ALBUMIN 4.2 07/13/2020    ALKPHOS 103 07/13/2020    ALT 52 07/13/2020    AST 47 07/13/2020        INR RESULTS:  Lab Results   Component Value Date    INR 2.7 (A) 07/01/2020       Lab Results   Component Value Date    MAG 1.9 04/08/2020     Lab Results   Component Value Date    NTBNPI 12,559 (H) 02/08/2020     No results found for: NTBNP    Diagnostics    ICK check 6/15/2020  In clinic device appointment converted to remote device appointment to minimize COVID19 exposure for high risk patient populations. Patient has an appt with Karolina  STEW Decker on 5/16/2020. Remote ICD transmission received and reviewed. Device transmission sent per MD orders. Patient has a Medtronic single lead ICD. Normal ICD function. 78 AT/AF episodes recorded - < 1 min - 1 hour 6 min in duration. AF burden = 1%. Patient is taking Warfarin. Presenting EGM = irregular ventricular rhythm @ ~ 100 bpm.  = <0.1%. OptiVol fluid index is near baseline. Estimated battery longevity to PERLA = 11.3 years. No short v-v intervals recorded. Lead trends appear stable. Patient notified of interrogation results. Patient reports that he is feeling well and denies specific complaints. Plan for patient to return to clinic on 8/26/2020 as scheduled.      Remote ICD transmission    ICD Check 4/14/2020  Medtronic, single lead ICD,  remote transmission received and reviewed. Device transmission sent per MD orders. In clinic device appointment conversion to remote device appointment to minimize COVID19 exposure for high risk patient populations. Pt reports he his the speed of his LVAD changed today and he hasn't felt well since then. His presenting rhythm is a regular ventricular rhythm @ 125 bpm. The morphology is similar to his previous intrinsic tracings. No arrhythmias recorded. Normal device function.  <0.1%. No short V-V intervals recorded. Lead trends appear stable. OptiVol thoracic impedance is establishing his baseline. Battery estimates 11.4 years to PERLA. Pt and Karolina Decker PA-C, notified of transmission results.  KRISTEN Dumont.     Remote ICD transmission.    CXR 3/9/2020:  1.  Stable supportive lines and tubes.  2.  Cardiomegaly with LVAD placement, stable.  3.  Right apical hemothorax evacuation. Right mid/lower lobe  subsegmental atelectasis, overall stable.     CT Chest 3/4/2020:  1. Grossly stable large right apical hemothorax compared to recent  radiographs. Tiny nondisplaced fractures of the medial right first and  second ribs. Tiny right pneumothorax.   2.  Intrafissural position of the right basilar chest tube abutting the  pericardium and with the tip immediately inferior to the right hilar  vasculature.   3. Small left pleural effusion, which also contains hyperdense fluid  suspicious for small component of left-sided hemothorax.  4. Stable retrosternal hematoma.  5. Cardiomegaly with small pericardial effusion versus  hemopericardium. Stable LVAD and pericardial drain.     Echo 2/25/2020:  Left ventricular size is normal. Severely reduced left ventricular systolic  function. Septum is midline  LVAD inflow or outflow cannulae not visualized. Doppler velocities are not  elevated.  Moderate to severely reduced right ventricular systolic function.  Aortic valve appears closed during the entire cardiac cycle. Trace aortic  insufficiency is present.  IVC is plethoric.  No pericardial effusion present.    Assessment and Plan:   Eliseo Tanner is a 65 year old male with chronic systolic heart failure secondary to NICM now s/p HM 3 on 2/18, moderate CAD, HTN, ABHINAV on CPAP, DM2, CKD Stage III, ANA. His HM3 post-op course was complicated by retrosternal hematoma, RV failure,VT in ICU now on amiodarone and Afib w/AVR S/p DCCV on 2/28. He had pre-op proteus and enterococcus bacteremia from 2/13, s/p abx. He had an ICD placed on 3/16/2020.  He presents today for routine clinic visit via video given ongoing COVID 19 outbreak.    He has been feeling well. Gaining some gradual weight, but no hypervolemia symptoms. Infact, he sounds more dry. He has started cardiac rehab a few weeks ago and feels very goodtwith this.     Unfortuantly, Cr continues to rise. We will back off on diuretics and recheck.      MAPS have been 75-85 at cardiac rehab.      Chronic SCHF secondary to NICM s/p HM III with RV dysfunction. Implanted 2/18 and complicated by bleeding.   Stage D, NYHA Class IIIA  ACEi/ARB Lisinopril 10/5 Hydralazine 75 mg po TID.   BB Has been deferred given RHF, may be able to  retrial in the next coming visits  Aldosterone antagonist deferred while other medical therapy is prioritized  SCD prophylaxis ICD  Fluid status Euvolemic: Decrease lasix to 20 mg daily and Kcl 10 meq daily  MAP: Goal 65-85  LDH: needs recheck  Anticoagulation: Coumadin per pharmacy.  Goal 2-3.  Antiplatelet: ASA 81 mg po daily  Speed: Did not tolerate a speed increase to 5600 on a prior attempt  RV support: Continue Digoxin    AoCKD  Cr up to 1.8 from BL of 1.3-1.5. Did not improve with increased diuretic challenge. Decreased diureitics today as above    VAD interrogation today: N/A video visit, but no alarms per patient    HTN.  Will continue transitioning hydralazine to lisinopril once Cr improves  - Continue Lisinopril   - Continue hydralazine     New Onset Afib s/p DCCV/history of Aflutter. History of Afib w/ RVR and aberrancy vs. NATHALIA vs. AT vs. Slow VT. S/p DCCV on 2/28 back into sinus rhythm.  - Coumadin as above.   - Continue Digoxin.     Follow up   - BMP and LDH in 1 week (LHD not draw, Cr up, decreased diuretics) and weight check  - Dr. Cisneros and EP as scheduled in August      BIJAL Padron TAMAS

## 2020-07-19 ENCOUNTER — TELEPHONE (OUTPATIENT)
Dept: CARDIOLOGY | Facility: CLINIC | Age: 65
End: 2020-07-19

## 2020-07-19 NOTE — TELEPHONE ENCOUNTER
D:  Pt called this morning to report that his weight had increased by 5 lbs since his virtual visit with Karolina MO On 7/14.  He reported that she had advised him to reduce his lasix dose from 40 mg daily to 20 mg daily at (per Karolina's note due to the possibility that he was hypovolemic contributing to a rise in his creatinine.)  However, he said she told him that if his weight went up he could return to the 40 mg daily.  He wanted to report that he went back to 40 mg today due to the weight gain.  I:  I asked him how he was feeling otherwise and he responded that otherwise things are going fine, offered no complaints.    A:  Weight gain attributed to fluid retention, patient increased lasix c/w recommenations from provider.  P:  Monitor weight, creatinine.

## 2020-07-21 DIAGNOSIS — Z95.811 LVAD (LEFT VENTRICULAR ASSIST DEVICE) PRESENT (H): ICD-10-CM

## 2020-07-21 RX ORDER — FUROSEMIDE 20 MG
40 TABLET ORAL DAILY
Qty: 90 TABLET | Refills: 3 | Status: ON HOLD | COMMUNITY
Start: 2020-07-21 | End: 2020-11-17

## 2020-07-27 ENCOUNTER — TELEPHONE (OUTPATIENT)
Dept: ANTICOAGULATION | Facility: CLINIC | Age: 65
End: 2020-07-27

## 2020-07-28 ENCOUNTER — ANTICOAGULATION THERAPY VISIT (OUTPATIENT)
Dept: ANTICOAGULATION | Facility: CLINIC | Age: 65
End: 2020-07-28

## 2020-07-28 DIAGNOSIS — I50.22 CHRONIC SYSTOLIC CONGESTIVE HEART FAILURE (H): ICD-10-CM

## 2020-07-28 DIAGNOSIS — Z95.811 LVAD (LEFT VENTRICULAR ASSIST DEVICE) PRESENT (H): ICD-10-CM

## 2020-07-28 LAB — INR PPP: 2.6 (ref 0.9–1.1)

## 2020-07-28 NOTE — PROGRESS NOTES
ANTICOAGULATION FOLLOW-UP CLINIC VISIT    Patient Name:  Eliseo Tanner  Date:  2020  Contact Type:  Telephone    SUBJECTIVE:         OBJECTIVE    Recent labs: (last 7 days)     20   INR 2.6*       ASSESSMENT / PLAN  No question data found.  Anticoagulation Summary  As of 2020    INR goal:   2.0-3.0   TTR:   88.4 % (4.1 mo)   INR used for dosin.6 (2020)   Warfarin maintenance plan:   4 mg (4 mg x 1) every day   Full warfarin instructions:   4 mg every day   Weekly warfarin total:   28 mg   No change documented:   Gloria Abbasi RN   Plan last modified:   Krysta Mcdaniel RN (2020)   Next INR check:   2020   Priority:   Critical   Target end date:   Indefinite    Indications    LVAD (left ventricular assist device) present (H) [Z95.811]  Chronic systolic congestive heart failure (H) [I50.22]             Anticoagulation Episode Summary     INR check location:       Preferred lab:       Send INR reminders to:    [facility]    Comments:   Patient on Amiodarone +++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 RICHARDS's NOTE 2/15/20++++  HM 3  placed 2020, 81mg ASA, Speak to spouse, Veronique at [phone]  2020:  Lab: [facility]:  ph: [phone] opt 5;   fax: [phone]      Anticoagulation Care Providers     Provider Role Specialty Phone number    Gucci Tomas MD Referring Cardiology [phone]    Nader Cisneros MD Referring Cardiology [phone]            See the Encounter Report to view Anticoagulation Flowsheet and Dosing Calendar (Go to Encounters tab in chart review, and find the Anticoagulation Therapy Visit)    Spoke with patient.     Gloria Abbasi, RN

## 2020-08-05 ENCOUNTER — RESULT FOLLOW UP (OUTPATIENT)
Dept: CARDIOLOGY | Facility: CLINIC | Age: 65
End: 2020-08-05

## 2020-08-05 LAB
ANION GAP SERPL CALCULATED.3IONS-SCNC: 6 MMOL/L
BUN SERPL-MCNC: 41 MG/DL
CALCIUM SERPL-MCNC: 9 MG/DL
CHLORIDE SERPLBLD-SCNC: 99 MMOL/L
CO2 SERPL-SCNC: 29 MMOL/L
CREAT SERPL-MCNC: 1.9 MG/DL
GFR SERPL CREATININE-BSD FRML MDRD: 38 ML/MIN/1.73M2
GLUCOSE SERPL-MCNC: 188 MG/DL (ref 70–99)
POTASSIUM SERPL-SCNC: 4.7 MMOL/L
SODIUM SERPL-SCNC: 134 MMOL/L

## 2020-08-05 NOTE — PROGRESS NOTES
"D:  Rcvd BMP results.  Pt reports weight today 213, was down to 209; \"bounces a bit\" per Eliseo.  MAP's have been in the mid 80's mostly.  Pt denies SOB and swelling.  I:  Situation discussed with Karolina MO  Plan: no change. PA to discuss situation with primary MD.  Next set of labs add BNP.  Ordered for August 10th w/ INR.  Pt advised.  A:  Lab result follow up.  P:  Pt verbalized understanding of the instructions given.  Will call VAD coordinator with further needs and questions.    "

## 2020-08-06 RX ORDER — ZOLPIDEM TARTRATE 5 MG/1
5 TABLET ORAL
Status: ON HOLD | COMMUNITY
Start: 2020-06-09 | End: 2023-01-01

## 2020-08-06 NOTE — PROGRESS NOTES
Called patient/caregiver to check in 20 weeks post discharge. Pt reports VAD parameters WNL and weight stable. Reviewed medications and answered any questions. Patient reports sleeping continues to be a challenge.  Primary MD started Ambien recently.  Patient is moveing around the house independently.     Discussed specific new problems/stressors since being discharged from the hospital. Encouraged pt to page VAD Coordinator with any issues or questions. Pt verbalizes understanding.

## 2020-08-10 LAB
BNP SERPL-MCNC: 1440 PG/ML
INR PPP: 2.2 (ref 0.9–1.1)

## 2020-08-11 ENCOUNTER — ANTICOAGULATION THERAPY VISIT (OUTPATIENT)
Dept: ANTICOAGULATION | Facility: CLINIC | Age: 65
End: 2020-08-11

## 2020-08-11 ENCOUNTER — RESULT FOLLOW UP (OUTPATIENT)
Dept: CARDIOLOGY | Facility: CLINIC | Age: 65
End: 2020-08-11

## 2020-08-11 DIAGNOSIS — Z95.811 LVAD (LEFT VENTRICULAR ASSIST DEVICE) PRESENT (H): ICD-10-CM

## 2020-08-11 DIAGNOSIS — I50.22 CHRONIC SYSTOLIC CONGESTIVE HEART FAILURE (H): ICD-10-CM

## 2020-08-11 DIAGNOSIS — I50.22 CHRONIC SYSTOLIC CONGESTIVE HEART FAILURE (H): Primary | ICD-10-CM

## 2020-08-11 NOTE — PROGRESS NOTES
ANTICOAGULATION FOLLOW-UP CLINIC VISIT    Patient Name:  Eliseo Tanner  Date:  2020  Contact Type:  Telephone    SUBJECTIVE:  Patient Findings         Clinical Outcomes     Negatives:   Major bleeding event, Thromboembolic event, Anticoagulation-related hospital admission, Anticoagulation-related ED visit, Anticoagulation-related fatality           OBJECTIVE    Recent labs: (last 7 days)     08/10/20   INR 2.2*       ASSESSMENT / PLAN  INR assessment THER    Recheck INR In: 2 WEEKS    INR Location Outside lab      Anticoagulation Summary  As of 2020    INR goal:   2.0-3.0   TTR:   89.5 % (4.6 mo)   INR used for dosin.2 (8/10/2020)   Warfarin maintenance plan:   4 mg (4 mg x 1) every day   Full warfarin instructions:   4 mg every day   Weekly warfarin total:   28 mg   No change documented:   Luiza Perkins, RN   Plan last modified:   Krysta Mcdaniel RN (2020)   Next INR check:   2020   Priority:   Critical   Target end date:   Indefinite    Indications    LVAD (left ventricular assist device) present (H) [Z95.811]  Chronic systolic congestive heart failure (H) [I50.22]             Anticoagulation Episode Summary     INR check location:       Preferred lab:       Send INR reminders to:   [facility]    Comments:   Patient on Amiodarone +++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 RICHARDS's NOTE 2/15/20++++  HM 3  placed 2020, 81mg ASA, Speak to spouse, Veronique at [phone]  2020:  Lab: [facility]:  ph: [phone] opt 5;   fax: [phone]      Anticoagulation Care Providers     Provider Role Specialty Phone number    Gucci Tomas MD Referring Cardiology [phone]    Nader Cisneros MD Referring Cardiology [phone]            See the Encounter Report to view Anticoagulation Flowsheet and Dosing Calendar (Go to Encounters tab in chart review, and find the Anticoagulation Therapy Visit)    Spoke with patient. Gave them their lab results  and new warfarin recommendation.  No changes in health, medication, or diet. No missed doses, no falls. No signs or symptoms of bleed or clotting.    Patient had LVAD placed on:   2/18/20  Type of LVAD: HM 3  Patient's current Aspirin dose: ASA 81mg Daily  LVAD Protocol followed: Yes   If Not Followed Explanation:  N/A    Luiza Perkins RN

## 2020-08-11 NOTE — LETTER
Patient Name: Eliseo Tanner   : 1955   Diagnosis/ICD-10: Heart Failure, unspecified I50.9; LVAD Z95.811   Requesting Physician: Dr. Nader Cisneros   Date of Request:    Date to Draw Every 2 weeks X 4 occurances     **Please fax results to Gladys VANESSA RN VAD Coord at [phone]. Call [phone] with Questions    ORDER CODE TESTS NANCY.VOL.   X CBASIC Na, K, Cl, CO2, Crea, BUN, Glu, Ca GG 0.5-1    BHEPAT Alb, AlkP, ALT, AST, BBil, TP GG 0.5-1    BLIP Chol, Trig, HDL, LDL GG 1-2    CCOMP Na, K, Cl, CO2, Crea, BUN, Glu, Ca, Alb, AlkP, ALT, AST, Bbil, TP,  GG 0.6-1    HGP CBC & Platelet P 0.3-1    HGDP CBC, Differential & Platelet P 0.3-1    PLT Platelet  P 0.3-1    INR INR B 2.7    PTT PTT B 2.7    LD Lactate Dehydrogenase GG 0.3-1    PHGB Plasma Hemoglobin GG 2-3    Pre-Albumin Pre-Albumin RG 1.0     D Dimer     BNP N terminal pro BNP       Signed,      Nader Cisneros MD  Heart Failure, Mechanical Circulatory Support and Transplant Cardiology   of Medicine,  Division of Cardiology, [facility]

## 2020-08-13 RX ORDER — LIDOCAINE 40 MG/G
CREAM TOPICAL
Status: CANCELLED | OUTPATIENT
Start: 2020-08-13

## 2020-08-13 NOTE — PROGRESS NOTES
D:  Lab results rcvd.   I:  Situation discussed with Karolina FRENANDEZ & Dr. Cisneros.  Plan: right heart cath, first available, BMP Q 2 weeks until then, no medication changes at this time.  Plan communicated to patient.  A:  Lab result follow up  P:  Pt verbalized understanding of the instructions given.  Will call VAD coordinator with further needs and questions.

## 2020-08-24 ENCOUNTER — RESULT FOLLOW UP (OUTPATIENT)
Dept: CARDIOLOGY | Facility: CLINIC | Age: 65
End: 2020-08-24

## 2020-08-24 ENCOUNTER — ANTICOAGULATION THERAPY VISIT (OUTPATIENT)
Dept: ANTICOAGULATION | Facility: CLINIC | Age: 65
End: 2020-08-24

## 2020-08-24 DIAGNOSIS — Z95.811 LVAD (LEFT VENTRICULAR ASSIST DEVICE) PRESENT (H): ICD-10-CM

## 2020-08-24 DIAGNOSIS — I50.22 CHRONIC SYSTOLIC CONGESTIVE HEART FAILURE (H): ICD-10-CM

## 2020-08-24 DIAGNOSIS — Z11.59 ENCOUNTER FOR SCREENING FOR OTHER VIRAL DISEASES: Primary | ICD-10-CM

## 2020-08-24 LAB
ANION GAP SERPL CALCULATED.3IONS-SCNC: 7 MMOL/L
BUN SERPL-MCNC: 42 MG/DL
CALCIUM SERPL-MCNC: 9 MG/DL
CHLORIDE SERPLBLD-SCNC: 101 MMOL/L
CO2 SERPL-SCNC: 27 MMOL/L
CREAT SERPL-MCNC: 1.8 MG/DL
GFR SERPL CREATININE-BSD FRML MDRD: 40 ML/MIN/1.73M2
GLUCOSE SERPL-MCNC: 182 MG/DL (ref 70–99)
INR PPP: 2.5 (ref 0.9–1.1)
POTASSIUM SERPL-SCNC: 4.6 MMOL/L
SODIUM SERPL-SCNC: 135 MMOL/L

## 2020-08-24 NOTE — PROGRESS NOTES
D:  Q2 week follow up labs rcvd.  I:  Results discussed with Laine Leon NP.  Plan labs as scheduled in 2 weeks.  Next set of labs due Sept 7th.  Pt advised.  A:  Lab result review  P:  Pt verbalized understanding of the instructions given.  Will call VAD coordinator with further needs and questions.

## 2020-08-24 NOTE — PROGRESS NOTES
ANTICOAGULATION FOLLOW-UP CLINIC VISIT    Patient Name:  Eliseo Tanner  Date:  2020  Contact Type:  Telephone    SUBJECTIVE:  Patient Findings     Comments:   Spoke with spouse, Veronique.  She reports Eliseo has not had any changes in health, diet, medications.          Clinical Outcomes     Comments:   Spoke with spouse, Veronique.  She reports Eliseo has not had any changes in health, diet, medications.             OBJECTIVE    Recent labs: (last 7 days)     20   INR 2.5*       ASSESSMENT / PLAN  INR assessment THER    Recheck INR In: 2 WEEKS    INR Location Outside lab      Anticoagulation Summary  As of 2020    INR goal:   2.0-3.0   TTR:   90.5 % (5 mo)   INR used for dosin.5 (2020)   Warfarin maintenance plan:   4 mg (4 mg x 1) every day   Full warfarin instructions:   4 mg every day   Weekly warfarin total:   28 mg   No change documented:   Krysta Mcdaniel RN   Plan last modified:   Krysta Mcdaniel RN (2020)   Next INR check:   2020   Priority:   Critical   Target end date:   Indefinite    Indications    LVAD (left ventricular assist device) present (H) [Z95.811]  Chronic systolic congestive heart failure (H) [I50.22]             Anticoagulation Episode Summary     INR check location:       Preferred lab:       Send INR reminders to:   [facility]    Comments:   Patient on Amiodarone +++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 RICHARDS's NOTE 2/15/20++++  HM 3  placed 2020, 81mg ASA, Speak to Veronique ramírez at [phone]  2020:  Lab: [facility]:  ph: [phone] opt 5;   fax: [phone]      Anticoagulation Care Providers     Provider Role Specialty Phone number    Gucci Tomas MD Referring Cardiology [phone]    Nader Cisneros MD Referring Cardiology [phone]            See the Encounter Report to view Anticoagulation Flowsheet and Dosing Calendar (Go to Encounters tab in chart review, and find the Anticoagulation Therapy  Visit)    Spoke with spouse, Veronique.      Patient had LVAD placed on:   2/18/2020  Type of LVAD: HM 3   Patient's current Aspirin dose: 81 mg daily  LVAD Protocol followed: Yes     Krysta Mcdaniel RN

## 2020-09-05 ENCOUNTER — ANCILLARY PROCEDURE (OUTPATIENT)
Dept: CARDIOLOGY | Facility: CLINIC | Age: 65
End: 2020-09-05
Attending: NURSE PRACTITIONER
Payer: MEDICARE

## 2020-09-05 DIAGNOSIS — Z95.810 S/P ICD (INTERNAL CARDIAC DEFIBRILLATOR) PROCEDURE: ICD-10-CM

## 2020-09-05 PROCEDURE — 93296 REM INTERROG EVL PM/IDS: CPT | Mod: ZF

## 2020-09-05 PROCEDURE — 93295 DEV INTERROG REMOTE 1/2/MLT: CPT | Performed by: INTERNAL MEDICINE

## 2020-09-07 ASSESSMENT — NEW YORK HEART ASSOCIATION (NYHA) CLASSIFICATION: NYHA FUNCTIONAL CLASS: IV

## 2020-09-07 ASSESSMENT — PULMONARY FUNCTION TESTS: FEV1 (%PREDICTED): 2

## 2020-09-07 NOTE — TELEPHONE ENCOUNTER
MCS Episodes    Linked  Type Noted    LVAD MECHANICAL CIRCULATORY DEVICE 02/10/2020    Episodes of Care      VAD Selection    VAD Multidisciplinary Review   Multidisciplinary review date: 2/7/20   Inclusion Criteria   Discussed VAD with patient and/or decision makers. Reviewed anticipated survival benefit, anticipated functional improvement, risks, and benefits. Patient and/or decision maker verbalize understanding and agree to VAD implant?: Yes   VAD is elective procedure?: Yes   NYHA class: IV   INTERMACS score: 2   Patient has: failed to respond to optimal medical management for at least 45 of the last 60 days   Cardiopulmonary stress testing completed?: None related to acute illness   LVEF is: < 25%   Exclusion Criteria   Has condition, other than heart failure, which would limit survival to < 2 years?: No   Cardiomyopathy with restrictive physiology, unless severe LV systolic failure and LV dilation present?: No   Technical obstacles that pose and inordinately high surgical risk in the judgment of the MCS surgeon?: No   Active systemic infection? (unless ALL of following criteria met: negative blood cultures x 2 days, antibiotic treatment x 7 days and Infectious Disease clearance pre-operatively): Yes   Negative blood culture x 2 days?: Yes   Antibiotic treatment x 7 days?: No   Infectious Disease clearance pre-operatively?: Yes   Presence of active malignancy AND life expectancy < 2 years?: No   Stroke within the last six weeks, unless cleared by neurology team: No   Diagnosed with severe, irreversible pulmonary disease (supplemental O2 usage, FEV1 < 50% predicted): No   Pulmonary hypertension as a primary diagnosis: No   Chronic dialysis: No   Evidence of significant peripheral vascular disease accompanied by resting leg pain or ulceration unless treatment plan is in place: No   Carotid artery disease (>80% extracranial stenosis on Doppler) that could result in an adverse neurological event if left  untreated: No   Evidence of an untreated abdominal aortic aneurysm >5 cm as measured by abdominal ultrasounds within the last 180 days unless treatment plan in place: No   Severe or irreversible hepatic disease, evidence of shock liver, and/o biopsy-proven cirrhosis: No   Active contraindication to anticoagulation, INR > 2.5 in absence of concurrent anticoagulation therapy, platelet < 50,000, history of intolerance to anticoagulation, or other coagulopathy unless Hematology consulted and plan is in place: Yes (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)   Acute valvular infective endocarditis with bacteremia: No   Neuromuscular disease, psychiatric diagnosis, dementia or other process that severely compromises ability to use and care for external system components or to ambulate/exercise: No   Inability to understand the procedure, risk involved, or to comply with follow-up evaluation by VAD team: No   For menstruating females: Current pregnancy or unable/unwilling to follow contraception plan: Not applicable   Relative Contraindications   Alcohol and/or recreational drug abuse within past six months: No   Significant history of depression or other mental illness, refractory to therapy or thought to be a risk to successful outcome with MCS, as per assessment of trained professional: No   Demonstrated on-going non-compliance with demonstrated inability to comply with medical recommendations on multiple occasions: No   Unsafe living environment or lack of adequate support system: No   Lack of adequate insurance coverage or waiver by hospital administration: No   Evidence of other ongoing physical, financial, or psychosocial issues that will preclude the intended goal of safety and improvements in quality and duration of life, unless a plan for resolution and support is in process: No   Multidisciplinary Decision   Decision: Approved     Implant as: Destination Therapy   Ineligible for transplant reason: Other   Specify:  infection, critical illness, HIT- will need to be re-evauated post recovery

## 2020-09-08 ENCOUNTER — ANTICOAGULATION THERAPY VISIT (OUTPATIENT)
Dept: ANTICOAGULATION | Facility: CLINIC | Age: 65
End: 2020-09-08

## 2020-09-08 DIAGNOSIS — Z95.811 LVAD (LEFT VENTRICULAR ASSIST DEVICE) PRESENT (H): ICD-10-CM

## 2020-09-08 DIAGNOSIS — I50.22 CHRONIC SYSTOLIC CONGESTIVE HEART FAILURE (H): ICD-10-CM

## 2020-09-08 LAB — INR PPP: 2.4 (ref 0.9–1.1)

## 2020-09-08 NOTE — PROGRESS NOTES
ANTICOAGULATION FOLLOW-UP CLINIC VISIT    Patient Name:  Eliseo Tanner  Date:  2020  Contact Type:  Telephone    SUBJECTIVE:         OBJECTIVE    Recent labs: (last 7 days)     20   INR 2.4*       ASSESSMENT / PLAN  No question data found.  Anticoagulation Summary  As of 2020    INR goal:   2.0-3.0   TTR:   91.4 % (5.5 mo)   INR used for dosin.4 (2020)   Warfarin maintenance plan:   4 mg (4 mg x 1) every day   Full warfarin instructions:   4 mg every day   Weekly warfarin total:   28 mg   No change documented:   Gloria Abbasi, RN   Plan last modified:   Krysta Mcdaniel RN (2020)   Next INR check:   9/15/2020   Priority:   Critical   Target end date:   Indefinite    Indications    LVAD (left ventricular assist device) present (H) [Z95.811]  Chronic systolic congestive heart failure (H) [I50.22]             Anticoagulation Episode Summary     INR check location:       Preferred lab:       Send INR reminders to:    [facility]    Comments:   Patient on Amiodarone +++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 RICHARDS's NOTE 2/15/20++++  HM 3  placed 2020, 81mg ASA, Speak to spouse, Veronique at [phone]  2020:  Lab: [facility]:  ph: [phone] opt 5;   fax: [phone]      Anticoagulation Care Providers     Provider Role Specialty Phone number    Gucci Tomas MD Referring Cardiology [phone]    Nader Cisneros MD Referring Cardiology [phone]            See the Encounter Report to view Anticoagulation Flowsheet and Dosing Calendar (Go to Encounters tab in chart review, and find the Anticoagulation Therapy Visit)    Spoke with patient.     Gloria Abbasi, RN

## 2020-09-09 ENCOUNTER — CARE COORDINATION (OUTPATIENT)
Dept: CARDIOLOGY | Facility: CLINIC | Age: 65
End: 2020-09-09

## 2020-09-09 DIAGNOSIS — I50.22 CHRONIC SYSTOLIC CONGESTIVE HEART FAILURE (H): Primary | ICD-10-CM

## 2020-09-09 LAB
ANION GAP SERPL CALCULATED.3IONS-SCNC: 7 MMOL/L
BUN SERPL-MCNC: 40 MG/DL
CALCIUM SERPL-MCNC: 8.8 MG/DL
CHLORIDE SERPLBLD-SCNC: 103 MMOL/L
CO2 SERPL-SCNC: 26 MMOL/L
CREAT SERPL-MCNC: 1.7 MG/DL
GFR SERPL CREATININE-BSD FRML MDRD: 43 ML/MIN/1.73M2
GLUCOSE SERPL-MCNC: 134 MG/DL (ref 70–99)
MDC_IDC_EPISODE_DTM: NORMAL
MDC_IDC_EPISODE_DURATION: 2640 S
MDC_IDC_EPISODE_DURATION: 2640 S
MDC_IDC_EPISODE_DURATION: 360 S
MDC_IDC_EPISODE_DURATION: 480 S
MDC_IDC_EPISODE_DURATION: 600 S
MDC_IDC_EPISODE_DURATION: 600 S
MDC_IDC_EPISODE_DURATION: 720 S
MDC_IDC_EPISODE_DURATION: 840 S
MDC_IDC_EPISODE_DURATION: 960 S
MDC_IDC_EPISODE_ID: 191
MDC_IDC_EPISODE_ID: 192
MDC_IDC_EPISODE_ID: 193
MDC_IDC_EPISODE_ID: 194
MDC_IDC_EPISODE_ID: 195
MDC_IDC_EPISODE_ID: 196
MDC_IDC_EPISODE_ID: 197
MDC_IDC_EPISODE_ID: 198
MDC_IDC_EPISODE_ID: 199
MDC_IDC_EPISODE_ID: 200
MDC_IDC_EPISODE_ID: 201
MDC_IDC_EPISODE_ID: 202
MDC_IDC_EPISODE_ID: 203
MDC_IDC_EPISODE_ID: 204
MDC_IDC_EPISODE_ID: 205
MDC_IDC_EPISODE_TYPE: NORMAL
MDC_IDC_LEAD_IMPLANT_DT: NORMAL
MDC_IDC_LEAD_LOCATION: NORMAL
MDC_IDC_LEAD_LOCATION_DETAIL_1: NORMAL
MDC_IDC_LEAD_MFG: NORMAL
MDC_IDC_LEAD_MODEL: NORMAL
MDC_IDC_LEAD_POLARITY_TYPE: NORMAL
MDC_IDC_LEAD_SERIAL: NORMAL
MDC_IDC_LEAD_SPECIAL_FUNCTION: NORMAL
MDC_IDC_MSMT_BATTERY_DTM: NORMAL
MDC_IDC_MSMT_BATTERY_REMAINING_LONGEVITY: 135 MO
MDC_IDC_MSMT_BATTERY_RRT_TRIGGER: 2.73
MDC_IDC_MSMT_BATTERY_STATUS: NORMAL
MDC_IDC_MSMT_BATTERY_VOLTAGE: 3.08 V
MDC_IDC_MSMT_CAP_CHARGE_DTM: NORMAL
MDC_IDC_MSMT_CAP_CHARGE_ENERGY: 18 J
MDC_IDC_MSMT_CAP_CHARGE_TIME: 3.78
MDC_IDC_MSMT_CAP_CHARGE_TYPE: NORMAL
MDC_IDC_MSMT_LEADCHNL_RV_IMPEDANCE_VALUE: 342 OHM
MDC_IDC_MSMT_LEADCHNL_RV_IMPEDANCE_VALUE: 399 OHM
MDC_IDC_MSMT_LEADCHNL_RV_PACING_THRESHOLD_AMPLITUDE: 0.5 V
MDC_IDC_MSMT_LEADCHNL_RV_PACING_THRESHOLD_PULSEWIDTH: 0.4 MS
MDC_IDC_MSMT_LEADCHNL_RV_SENSING_INTR_AMPL: 16.88 MV
MDC_IDC_MSMT_LEADCHNL_RV_SENSING_INTR_AMPL: 16.88 MV
MDC_IDC_PG_IMPLANT_DTM: NORMAL
MDC_IDC_PG_MFG: NORMAL
MDC_IDC_PG_MODEL: NORMAL
MDC_IDC_PG_SERIAL: NORMAL
MDC_IDC_PG_TYPE: NORMAL
MDC_IDC_SESS_CLINIC_NAME: NORMAL
MDC_IDC_SESS_DTM: NORMAL
MDC_IDC_SESS_TYPE: NORMAL
MDC_IDC_SET_BRADY_HYSTRATE: NORMAL
MDC_IDC_SET_BRADY_LOWRATE: 40 {BEATS}/MIN
MDC_IDC_SET_BRADY_MODE: NORMAL
MDC_IDC_SET_LEADCHNL_RV_PACING_AMPLITUDE: 2 V
MDC_IDC_SET_LEADCHNL_RV_PACING_ANODE_ELECTRODE_1: NORMAL
MDC_IDC_SET_LEADCHNL_RV_PACING_ANODE_LOCATION_1: NORMAL
MDC_IDC_SET_LEADCHNL_RV_PACING_CAPTURE_MODE: NORMAL
MDC_IDC_SET_LEADCHNL_RV_PACING_CATHODE_ELECTRODE_1: NORMAL
MDC_IDC_SET_LEADCHNL_RV_PACING_CATHODE_LOCATION_1: NORMAL
MDC_IDC_SET_LEADCHNL_RV_PACING_POLARITY: NORMAL
MDC_IDC_SET_LEADCHNL_RV_PACING_PULSEWIDTH: 0.4 MS
MDC_IDC_SET_LEADCHNL_RV_SENSING_ANODE_ELECTRODE_1: NORMAL
MDC_IDC_SET_LEADCHNL_RV_SENSING_ANODE_LOCATION_1: NORMAL
MDC_IDC_SET_LEADCHNL_RV_SENSING_CATHODE_ELECTRODE_1: NORMAL
MDC_IDC_SET_LEADCHNL_RV_SENSING_CATHODE_LOCATION_1: NORMAL
MDC_IDC_SET_LEADCHNL_RV_SENSING_POLARITY: NORMAL
MDC_IDC_SET_LEADCHNL_RV_SENSING_SENSITIVITY: 0.3 MV
MDC_IDC_SET_ZONE_DETECTION_BEATS_DENOMINATOR: 40 {BEATS}
MDC_IDC_SET_ZONE_DETECTION_BEATS_NUMERATOR: 30 {BEATS}
MDC_IDC_SET_ZONE_DETECTION_INTERVAL: 270 MS
MDC_IDC_SET_ZONE_DETECTION_INTERVAL: 340 MS
MDC_IDC_SET_ZONE_DETECTION_INTERVAL: 360 MS
MDC_IDC_SET_ZONE_DETECTION_INTERVAL: NORMAL
MDC_IDC_SET_ZONE_TYPE: NORMAL
MDC_IDC_STAT_AT_BURDEN_PERCENT: 0.2 %
MDC_IDC_STAT_AT_DTM_END: NORMAL
MDC_IDC_STAT_AT_DTM_START: NORMAL
MDC_IDC_STAT_BRADY_DTM_END: NORMAL
MDC_IDC_STAT_BRADY_DTM_START: NORMAL
MDC_IDC_STAT_BRADY_RV_PERCENT_PACED: 0 %
MDC_IDC_STAT_EPISODE_RECENT_COUNT: 0
MDC_IDC_STAT_EPISODE_RECENT_COUNT: 15
MDC_IDC_STAT_EPISODE_RECENT_COUNT_DTM_END: NORMAL
MDC_IDC_STAT_EPISODE_RECENT_COUNT_DTM_START: NORMAL
MDC_IDC_STAT_EPISODE_TOTAL_COUNT: 0
MDC_IDC_STAT_EPISODE_TOTAL_COUNT: 205
MDC_IDC_STAT_EPISODE_TOTAL_COUNT_DTM_END: NORMAL
MDC_IDC_STAT_EPISODE_TOTAL_COUNT_DTM_START: NORMAL
MDC_IDC_STAT_EPISODE_TYPE: NORMAL
MDC_IDC_STAT_TACHYTHERAPY_ATP_DELIVERED_RECENT: 0
MDC_IDC_STAT_TACHYTHERAPY_ATP_DELIVERED_TOTAL: 0
MDC_IDC_STAT_TACHYTHERAPY_RECENT_DTM_END: NORMAL
MDC_IDC_STAT_TACHYTHERAPY_RECENT_DTM_START: NORMAL
MDC_IDC_STAT_TACHYTHERAPY_SHOCKS_ABORTED_RECENT: 0
MDC_IDC_STAT_TACHYTHERAPY_SHOCKS_ABORTED_TOTAL: 0
MDC_IDC_STAT_TACHYTHERAPY_SHOCKS_DELIVERED_RECENT: 0
MDC_IDC_STAT_TACHYTHERAPY_SHOCKS_DELIVERED_TOTAL: 0
MDC_IDC_STAT_TACHYTHERAPY_TOTAL_DTM_END: NORMAL
MDC_IDC_STAT_TACHYTHERAPY_TOTAL_DTM_START: NORMAL
POTASSIUM SERPL-SCNC: 4.5 MMOL/L
SODIUM SERPL-SCNC: 136 MMOL/L

## 2020-09-09 NOTE — PROGRESS NOTES
D:  Q2 week follow up labs rcvd.  Labs stable.  I:  Results discussed with Karolina MATHIAS.  Plan labs as scheduled in 2 weeks.  Next set of labs due Sept 21st.  Pt advised.  A:  Lab result review  P:  Pt verbalized understanding of the instructions given.  Will call VAD coordinator with further needs and questions.

## 2020-09-10 NOTE — PROGRESS NOTES
D:  Pt reporting weight gain of 5 pounds yesterday and an additional 5 pounds today. Pt denies SOB.  He does endorse a more full belly.  I:  Discussed situation with Karolina MATHIAS.    Plan: for two days, take lasix 40 mg BID with 20 meq of Kcl (start today). If he doesn't loose weight he needs to call over the weekend.   - If he looses 2-5 lbs by Saturday he can come down to 40/20 with 10 meq of Kcl.   - If he looses 5-13 lbs can come back to 40 mg daily with 10 meq of K   - If he looses >13 lbs should call the weekend team.   Any of the above please weight check on Monday.  Pt advised.  A:  Weight gain  P:  Pt verbalized understanding of the instructions given.  Will call VAD coordinator with further needs and questions.

## 2020-09-10 NOTE — PROGRESS NOTES
Called patient/caregiver to check in 24 weeks post discharge. Pt reports VAD parameters Current LVAD numbers, Speed: 5500, Flow: 4.3, PI: 3.5, Power: 4.6 and weight 220, which is 10 pounds in 2 days. (See separate note) Reviewed medications and answered any questions. Patient reports sleeping better and no anxiety since being home with LVAD. Patient is able to move around the house and care for himself independently.     Discussed specific new problems/stressors since being discharged from the hospital: none.  Encouraged pt to page VAD Coordinator with any issues or questions. Pt verbalizes understanding.

## 2020-09-14 ENCOUNTER — DOCUMENTATION ONLY (OUTPATIENT)
Dept: CARDIOLOGY | Facility: CLINIC | Age: 65
End: 2020-09-14

## 2020-09-15 ENCOUNTER — CARE COORDINATION (OUTPATIENT)
Dept: CARDIOLOGY | Facility: CLINIC | Age: 65
End: 2020-09-15

## 2020-09-15 NOTE — PROGRESS NOTES
Addendum 9/15/820    Spoke with Eliseo he will have his next INR on 9/22/20    Wild Olson [facility]

## 2020-09-15 NOTE — PROGRESS NOTES
Situation/Background:  This writer spoke with Mr. Tanner yesterday to check in on weight. On 9/9 pt was up from his baseline of 211 lb to 220 lb. At that time his lasix was increased to 40 mg BID and KCl 20 mEq daily.     After increasing his lasix, his wt dropped to 214 lb on Saturday so he decreased his Lasix to 40 mg daily and continued KCl 20 mEq. Yesterday wt was 217 lb. He denies edema but does endorse some SOB. Pt states his diet is unchanged.     Assessment/Plan:  Discussed with STEW Hadley. Plan to continue 40 mg BID and KCl 20 mEq daily until his wt is down to goal of 212 lb. When pt's wt is at goal, he will resume is baseline dosing of Lasix 40 mg daily and KCl 10 mEq. He will also notify his primary coordinator that he is back at goal weight and that he is resuming his normal doses. If by 9/18, pt's wt continues to be > 212 lb despite increased dose of lasix, he will notify VAD team to discuss plan and understands that he will need to get labs. Writer reviewed this plan today with patient. His weight was 214 lb today. He agrees with plan and verbalizes understanding.

## 2020-09-17 LAB
COVID-19 VIRUS BY PCR (EXTERNAL RESULT): NEGATIVE
SCAN LAB RESULTS (EXTERNAL): NORMAL

## 2020-09-18 ENCOUNTER — TELEPHONE (OUTPATIENT)
Dept: CARDIOLOGY | Facility: CLINIC | Age: 65
End: 2020-09-18

## 2020-09-18 NOTE — TELEPHONE ENCOUNTER
Call complete for pre procedure reminder, travel screen and updated visitor policy.  [place]. Pt provided them with Covid fax number

## 2020-09-21 ENCOUNTER — CARE COORDINATION (OUTPATIENT)
Dept: CARDIOLOGY | Facility: CLINIC | Age: 65
End: 2020-09-21

## 2020-09-21 ENCOUNTER — APPOINTMENT (OUTPATIENT)
Dept: LAB | Facility: CLINIC | Age: 65
End: 2020-09-21
Attending: INTERNAL MEDICINE
Payer: MEDICARE

## 2020-09-21 ENCOUNTER — APPOINTMENT (OUTPATIENT)
Dept: MEDSURG UNIT | Facility: CLINIC | Age: 65
End: 2020-09-21
Attending: INTERNAL MEDICINE
Payer: MEDICARE

## 2020-09-21 ENCOUNTER — HOSPITAL ENCOUNTER (OUTPATIENT)
Facility: CLINIC | Age: 65
Discharge: HOME | End: 2020-09-21
Attending: INTERNAL MEDICINE | Admitting: INTERNAL MEDICINE
Payer: MEDICARE

## 2020-09-21 ENCOUNTER — OFFICE VISIT (OUTPATIENT)
Dept: CARDIOLOGY | Facility: CLINIC | Age: 65
End: 2020-09-21
Attending: INTERNAL MEDICINE
Payer: MEDICARE

## 2020-09-21 ENCOUNTER — ANCILLARY PROCEDURE (OUTPATIENT)
Dept: GENERAL RADIOLOGY | Facility: CLINIC | Age: 65
End: 2020-09-21
Attending: PHYSICIAN ASSISTANT
Payer: MEDICARE

## 2020-09-21 ENCOUNTER — ANTICOAGULATION THERAPY VISIT (OUTPATIENT)
Dept: ANTICOAGULATION | Facility: CLINIC | Age: 65
End: 2020-09-21

## 2020-09-21 VITALS
DIASTOLIC BLOOD PRESSURE: 86 MMHG | BODY MASS INDEX: 33.83 KG/M2 | OXYGEN SATURATION: 97 % | RESPIRATION RATE: 20 BRPM | WEIGHT: 216 LBS | HEART RATE: 55 BPM | SYSTOLIC BLOOD PRESSURE: 103 MMHG | TEMPERATURE: 97.7 F

## 2020-09-21 VITALS
HEART RATE: 113 BPM | HEIGHT: 67 IN | SYSTOLIC BLOOD PRESSURE: 80 MMHG | BODY MASS INDEX: 33.9 KG/M2 | OXYGEN SATURATION: 97 % | WEIGHT: 216 LBS

## 2020-09-21 DIAGNOSIS — I50.22 CHRONIC SYSTOLIC CONGESTIVE HEART FAILURE (H): ICD-10-CM

## 2020-09-21 DIAGNOSIS — Z95.811 LVAD (LEFT VENTRICULAR ASSIST DEVICE) PRESENT (H): ICD-10-CM

## 2020-09-21 DIAGNOSIS — I50.22 CHRONIC SYSTOLIC CONGESTIVE HEART FAILURE (H): Primary | ICD-10-CM

## 2020-09-21 LAB
ALBUMIN SERPL-MCNC: 3.8 G/DL (ref 3.4–5)
ALP SERPL-CCNC: 117 U/L (ref 40–150)
ALT SERPL W P-5'-P-CCNC: 118 U/L (ref 0–70)
ANION GAP SERPL CALCULATED.3IONS-SCNC: 5 MMOL/L (ref 3–14)
ANION GAP SERPL CALCULATED.3IONS-SCNC: 8 MMOL/L (ref 3–14)
AST SERPL W P-5'-P-CCNC: 136 U/L (ref 0–45)
BASOPHILS # BLD AUTO: 0 10E9/L (ref 0–0.2)
BASOPHILS NFR BLD AUTO: 0.6 %
BILIRUB DIRECT SERPL-MCNC: 0.2 MG/DL (ref 0–0.2)
BILIRUB SERPL-MCNC: 0.6 MG/DL (ref 0.2–1.3)
BUN SERPL-MCNC: 56 MG/DL (ref 7–30)
BUN SERPL-MCNC: 62 MG/DL (ref 7–30)
CALCIUM SERPL-MCNC: 8.9 MG/DL (ref 8.5–10.1)
CALCIUM SERPL-MCNC: 8.9 MG/DL (ref 8.5–10.1)
CHLORIDE SERPL-SCNC: 106 MMOL/L (ref 94–109)
CHLORIDE SERPL-SCNC: 106 MMOL/L (ref 94–109)
CO2 SERPL-SCNC: 25 MMOL/L (ref 20–32)
CO2 SERPL-SCNC: 26 MMOL/L (ref 20–32)
CREAT SERPL-MCNC: 2.26 MG/DL (ref 0.66–1.25)
CREAT SERPL-MCNC: 2.33 MG/DL (ref 0.66–1.25)
CRP SERPL-MCNC: 6.6 MG/L (ref 0–8)
D DIMER PPP FEU-MCNC: 2.9 UG/ML FEU (ref 0–0.5)
DIFFERENTIAL METHOD BLD: ABNORMAL
EOSINOPHIL # BLD AUTO: 0.1 10E9/L (ref 0–0.7)
EOSINOPHIL NFR BLD AUTO: 1.1 %
ERYTHROCYTE [DISTWIDTH] IN BLOOD BY AUTOMATED COUNT: 20.4 % (ref 10–15)
GFR SERPL CREATININE-BSD FRML MDRD: 28 ML/MIN/{1.73_M2}
GFR SERPL CREATININE-BSD FRML MDRD: 29 ML/MIN/{1.73_M2}
GLUCOSE SERPL-MCNC: 138 MG/DL (ref 70–99)
GLUCOSE SERPL-MCNC: 145 MG/DL (ref 70–99)
HCT VFR BLD AUTO: 36.8 % (ref 40–53)
HGB BLD-MCNC: 10.9 G/DL (ref 13.3–17.7)
IMM GRANULOCYTES # BLD: 0.1 10E9/L (ref 0–0.4)
IMM GRANULOCYTES NFR BLD: 0.7 %
INR PPP: 1.96 (ref 0.86–1.14)
INR PPP: 2.1 (ref 0.86–1.14)
INTERPRETATION ECG - MUSE: NORMAL
LACTATE BLD-SCNC: 1.3 MMOL/L (ref 0.7–2)
LDH SERPL L TO P-CCNC: 421 U/L (ref 85–227)
LYMPHOCYTES # BLD AUTO: 1.1 10E9/L (ref 0.8–5.3)
LYMPHOCYTES NFR BLD AUTO: 14.7 %
MCH RBC QN AUTO: 21.5 PG (ref 26.5–33)
MCHC RBC AUTO-ENTMCNC: 29.6 G/DL (ref 31.5–36.5)
MCV RBC AUTO: 73 FL (ref 78–100)
MONOCYTES # BLD AUTO: 0.7 10E9/L (ref 0–1.3)
MONOCYTES NFR BLD AUTO: 10.2 %
NEUTROPHILS # BLD AUTO: 5.3 10E9/L (ref 1.6–8.3)
NEUTROPHILS NFR BLD AUTO: 72.7 %
NRBC # BLD AUTO: 0 10*3/UL
NRBC BLD AUTO-RTO: 0 /100
PLATELET # BLD AUTO: 315 10E9/L (ref 150–450)
POTASSIUM SERPL-SCNC: 4.3 MMOL/L (ref 3.4–5.3)
POTASSIUM SERPL-SCNC: 4.4 MMOL/L (ref 3.4–5.3)
POTASSIUM SERPL-SCNC: 4.4 MMOL/L (ref 3.4–5.3)
PROT SERPL-MCNC: 8.4 G/DL (ref 6.8–8.8)
RBC # BLD AUTO: 5.07 10E12/L (ref 4.4–5.9)
SODIUM SERPL-SCNC: 136 MMOL/L (ref 133–144)
SODIUM SERPL-SCNC: 139 MMOL/L (ref 133–144)
WBC # BLD AUTO: 7.2 10E9/L (ref 4–11)

## 2020-09-21 PROCEDURE — 85025 COMPLETE CBC W/AUTO DIFF WBC: CPT | Performed by: PHYSICIAN ASSISTANT

## 2020-09-21 PROCEDURE — 86140 C-REACTIVE PROTEIN: CPT | Performed by: PHYSICIAN ASSISTANT

## 2020-09-21 PROCEDURE — G0463 HOSPITAL OUTPT CLINIC VISIT: HCPCS | Mod: 25,ZF

## 2020-09-21 PROCEDURE — 93451 RIGHT HEART CATH: CPT | Performed by: INTERNAL MEDICINE

## 2020-09-21 PROCEDURE — 25000125 ZZHC RX 250: Performed by: PHYSICIAN ASSISTANT

## 2020-09-21 PROCEDURE — 25000125 ZZHC RX 250: Performed by: INTERNAL MEDICINE

## 2020-09-21 PROCEDURE — 40000166 ZZH STATISTIC PP CARE STAGE 1

## 2020-09-21 PROCEDURE — 80048 BASIC METABOLIC PNL TOTAL CA: CPT | Performed by: PHYSICIAN ASSISTANT

## 2020-09-21 PROCEDURE — 87040 BLOOD CULTURE FOR BACTERIA: CPT | Performed by: PHYSICIAN ASSISTANT

## 2020-09-21 PROCEDURE — 36415 COLL VENOUS BLD VENIPUNCTURE: CPT | Performed by: PHYSICIAN ASSISTANT

## 2020-09-21 PROCEDURE — 93750 INTERROGATION VAD IN PERSON: CPT | Mod: ZF | Performed by: PHYSICIAN ASSISTANT

## 2020-09-21 PROCEDURE — 27210794 ZZH OR GENERAL SUPPLY STERILE: Performed by: INTERNAL MEDICINE

## 2020-09-21 PROCEDURE — 36415 COLL VENOUS BLD VENIPUNCTURE: CPT | Performed by: INTERNAL MEDICINE

## 2020-09-21 PROCEDURE — 99214 OFFICE O/P EST MOD 30 MIN: CPT | Mod: 25 | Performed by: PHYSICIAN ASSISTANT

## 2020-09-21 PROCEDURE — 83615 LACTATE (LD) (LDH) ENZYME: CPT | Performed by: PHYSICIAN ASSISTANT

## 2020-09-21 PROCEDURE — 85610 PROTHROMBIN TIME: CPT | Performed by: INTERNAL MEDICINE

## 2020-09-21 PROCEDURE — 93451 RIGHT HEART CATH: CPT | Mod: 26 | Performed by: INTERNAL MEDICINE

## 2020-09-21 PROCEDURE — 80076 HEPATIC FUNCTION PANEL: CPT | Performed by: PHYSICIAN ASSISTANT

## 2020-09-21 PROCEDURE — 85379 FIBRIN DEGRADATION QUANT: CPT | Performed by: INTERNAL MEDICINE

## 2020-09-21 PROCEDURE — 83605 ASSAY OF LACTIC ACID: CPT | Performed by: PHYSICIAN ASSISTANT

## 2020-09-21 PROCEDURE — 85610 PROTHROMBIN TIME: CPT | Mod: 91 | Performed by: PHYSICIAN ASSISTANT

## 2020-09-21 PROCEDURE — 36415 COLL VENOUS BLD VENIPUNCTURE: CPT | Mod: 59 | Performed by: PHYSICIAN ASSISTANT

## 2020-09-21 RX ORDER — LIDOCAINE 40 MG/G
CREAM TOPICAL
Status: COMPLETED | OUTPATIENT
Start: 2020-09-21 | End: 2020-09-21

## 2020-09-21 RX ORDER — MECLIZINE HYDROCHLORIDE 25 MG/1
12.5 TABLET ORAL 3 TIMES DAILY PRN
Qty: 3 TABLET | Refills: 0 | Status: ON HOLD | OUTPATIENT
Start: 2020-09-21 | End: 2020-11-15

## 2020-09-21 RX ADMIN — LIDOCAINE: 40 CREAM TOPICAL at 10:13

## 2020-09-21 ASSESSMENT — MIFFLIN-ST. JEOR: SCORE: 1713.4

## 2020-09-21 ASSESSMENT — PAIN SCALES - GENERAL: PAINLEVEL: NO PAIN (0)

## 2020-09-21 NOTE — PROGRESS NOTES
Pt admitted to  for a RHC.  Emla applied to right neck.  Pt has a LVAD with settings speed 5500, and flow 4.6, and PI 32-34.  Consent and H and P up to date.  INR 2.1, pt on coumadin last dose yesterday.

## 2020-09-21 NOTE — PROGRESS NOTES
ANTICOAGULATION FOLLOW-UP CLINIC VISIT    Patient Name:  Eliseo Tanner  Date:  2020  Contact Type:  Telephone    SUBJECTIVE:  Patient Findings     Comments:   Patient had a RHC today.  According to documentation procedure was uneventful.         Clinical Outcomes     Comments:   Patient had a RHC today.  According to documentation procedure was uneventful.            OBJECTIVE    Recent labs: (last 7 days)     20  0836   INR 2.10*       ASSESSMENT / PLAN  No question data found.  Anticoagulation Summary  As of 2020    INR goal:   2.0-3.0   TTR:   92.0 % (6 mo)   INR used for dosin.10 (2020)   Warfarin maintenance plan:   4 mg (4 mg x 1) every day   Full warfarin instructions:   4 mg every day   Weekly warfarin total:   28 mg   No change documented:   Gloria Abbasi RN   Plan last modified:   Krysta Mcdainel RN (2020)   Next INR check:   10/5/2020   Priority:   Critical   Target end date:   Indefinite    Indications    LVAD (left ventricular assist device) present (H) [Z95.811]  Chronic systolic congestive heart failure (H) [I50.22]             Anticoagulation Episode Summary     INR check location:       Preferred lab:       Send INR reminders to:   [facility]    Comments:   Patient on Amiodarone +++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 RICHARDS's NOTE 2/15/20++++  HM 3  placed 2020, 81mg ASA, Speak to spouse, Veronique at [phone]  2020:  Lab: [facility]:  ph: [phone] opt 5;   fax: [phone]      Anticoagulation Care Providers     Provider Role Specialty Phone number    Gucci Tomas MD Referring Cardiology [phone]    Nader Cisneros MD Referring Cardiology [phone]            See the Encounter Report to view Anticoagulation Flowsheet and Dosing Calendar (Go to Encounters tab in chart review, and find the Anticoagulation Therapy Visit)    Left message for patient with results and dosing recommendations. Asked  patient to call back to report any missed doses, falls, signs and symptoms of bleeding or clotting, any changes in health, medication, or diet. Asked patient to call back with any questions or concerns.      Gloria Abbasi RN

## 2020-09-21 NOTE — IP AVS SNAPSHOT
MRN:[id number]                      After Visit Summary   9/21/2020    Eliseo Tanner    MRN: [id number]           Visit Information        Department      9/21/2020  8:21 AM [address] [facility] [place]          Review of your medicines      UNREVIEWED medicines. Ask your doctor about these medicines       Dose / Directions   acetaminophen 325 MG tablet  Commonly known as:  TYLENOL  Used for:  LVAD (left ventricular assist device) present (H)      Dose:  650 mg  Take 2 tablets (650 mg) by mouth every 4 hours as needed for mild pain or fever  Quantity:  100 tablet  Refills:  0     albuterol 108 (90 Base) MCG/ACT inhaler  Commonly known as:  PROAIR HFA/PROVENTIL HFA/VENTOLIN HFA  Used for:  LVAD (left ventricular assist device) present (H)      Dose:  2 puff  Inhale 2 puffs into the lungs every 6 hours as needed for wheezing  Quantity:  1 Inhaler  Refills:  1     allopurinol 100 MG tablet  Commonly known as:  ZYLOPRIM  Used for:  LVAD (left ventricular assist device) present (H)      Dose:  200 mg  2 tablets (200 mg) by Oral or Feeding Tube route daily  Quantity:  60 tablet  Refills:  1     amiodarone 200 MG tablet  Commonly known as:  PACERONE  Used for:  LVAD (left ventricular assist device) present (H)      Dose:  200 mg  Take 1 tablet (200 mg) by mouth daily  Quantity:  90 tablet  Refills:  3     aspirin 81 MG EC tablet  Commonly known as:  ASA  Used for:  LVAD (left ventricular assist device) present (H)      Dose:  81 mg  Take 1 tablet (81 mg) by mouth daily  Quantity:  90 tablet  Refills:  3     atorvastatin 20 MG tablet  Commonly known as:  LIPITOR  Used for:  LVAD (left ventricular assist device) present (H)      Dose:  20 mg  1 tablet (20 mg) by Oral or Feeding Tube route every evening  Quantity:  90 tablet  Refills:  3     diclofenac 1 % topical gel  Commonly known as:  VOLTAREN  Used for:  LVAD (left ventricular assist device) present (H)      Dose:  4 g  Apply 4 g topically 4 times  daily as needed for moderate pain  Quantity:  1 Tube  Refills:  1     digoxin 125 MCG tablet  Commonly known as:  LANOXIN  Used for:  LVAD (left ventricular assist device) present (H)      Dose:  125 mcg  Take 1 tablet (125 mcg) by mouth daily  Quantity:  90 tablet  Refills:  3     FLUoxetine 20 MG capsule  Commonly known as:  PROzac  Used for:  LVAD (left ventricular assist device) present (H)      Dose:  20 mg  Take 1 capsule (20 mg) by mouth daily  Quantity:  30 capsule  Refills:  1     furosemide 20 MG tablet  Commonly known as:  LASIX  Used for:  LVAD (left ventricular assist device) present (H)      Dose:  40 mg  Take 2 tablets (40 mg) by mouth daily  Quantity:  90 tablet  Refills:  3     hydrALAZINE 25 MG tablet  Commonly known as:  APRESOLINE  Used for:  Chronic systolic congestive heart failure (H)      Dose:  100 mg  Take 4 tablets (100 mg) by mouth 3 times daily  Quantity:  810 tablet  Refills:  3     insulin glargine 100 UNIT/ML pen  Commonly known as:  LANTUS PEN  Used for:  LVAD (left ventricular assist device) present (H)      Dose:  10 Units  Inject 10 Units Subcutaneous At Bedtime  Quantity:  3 mL  Refills:  1     lisinopril 10 MG tablet  Commonly known as:  ZESTRIL  Used for:  LVAD (left ventricular assist device) present (H)      Dose:  10 mg  Take 1 tablet (10 mg) by mouth daily  Quantity:  90 tablet  Refills:  3     magnesium oxide 400 MG tablet  Commonly known as:  MAG-OX  Used for:  LVAD (left ventricular assist device) present (H)      Dose:  400 mg  1 tablet (400 mg) by Oral or Feeding Tube route daily  Quantity:  30 tablet  Refills:  1     methocarbamol 500 MG tablet  Commonly known as:  ROBAXIN  Used for:  LVAD (left ventricular assist device) present (H)      Dose:  500 mg  Take 1 tablet (500 mg) by mouth 4 times daily as needed for muscle spasms  Quantity:  30 tablet  Refills:  0     MULTIPLE MINERALS-VITAMINS PO      Dose:  1 tablet  Take 1 tablet by mouth daily  Refills:  0      multivitamin w/minerals tablet  Used for:  LVAD (left ventricular assist device) present (H)      Dose:  1 tablet  Take 1 tablet by mouth daily  Quantity:  30 tablet  Refills:  1     ondansetron 4 MG tablet  Commonly known as:  ZOFRAN      Dose:  4 mg  Take 4 mg by mouth every 4 hours as needed for nausea  Refills:  0     pantoprazole 40 MG EC tablet  Commonly known as:  PROTONIX  Used for:  LVAD (left ventricular assist device) present (H)      Dose:  40 mg  Take 1 tablet (40 mg) by mouth every morning (before breakfast)  Quantity:  30 tablet  Refills:  0     potassium chloride ER 10 MEQ CR tablet  Commonly known as:  KLOR-CON M  Used for:  LVAD (left ventricular assist device) present (H)      Dose:  10 mEq  Take 1 tablet (10 mEq) by mouth daily  Quantity:  30 tablet  Refills:  0     warfarin ANTICOAGULANT 4 MG tablet  Commonly known as:  COUMADIN  Used for:  LVAD (left ventricular assist device) present (H)      Take as directed. If you are unsure how to take this medication, talk to your nurse or doctor.  Original instructions:  Take 4-8mg daily or as directed by the coumadin clinic  Quantity:  150 tablet  Refills:  3     zolpidem 5 MG tablet  Commonly known as:  AMBIEN      Dose:  5 mg  Take 5 mg by mouth nightly as needed for Insomnia  Refills:  0        CONTINUE these medicines which have NOT CHANGED       Dose / Directions   BD JAIME U/F 32G X 4 MM miscellaneous  Generic drug:  insulin pen needle      Refills:  0     coenzyme Q-10 200 MG Caps      Dose:  200 mg  200 mg by Oral or Feeding Tube route daily  Refills:  0     melatonin 5 MG tablet      Dose:  10 mg  Take 2 tablets (10 mg) by mouth nightly as needed for sleep  Refills:  0              Protect others around you: Learn how to safely use, store and throw away your medicines at www.disposemymeds.org.       Follow-ups after your visit       Your next 10 appointments already scheduled    Sep 21, 2020  Procedure with Dalton Baeza MD  [facility],  [place],  Heart Cath Lab ([facility], [facility]) [address] [address]  [phone]   The [facility] is located on the corner of [address] and [address] on the [facility]. It is easily accessible from virtually any point in the [facility] [place] area, via [address].   Sep 21, 2020  1:00 PM CDT  (Arrive by 12:45 PM)  Ventricular Assist Device with Mervat Decker PA-C  [facility] ([facility]) [address] [place] [address]  [phone]      Oct 07, 2020  9:00 AM CDT  (Arrive by 8:45 AM)  Video Visit with JUAN Roque CNP  [facility] ([facility]) [address] [place] [id number] [phone]   [facility]  Note: this is not an onsite visit; there is no need to come to the facility.  Please have a list of all current medications available for appointment.      Dec 21, 2020 12:00 AM CST  CARDIAC DEVICE CHECK - REMOTE with  ICD REMOTE  [facility] ([facility]) [address] [place] [address]  [address]  [facility] [address]  [phone]         Care Instructions       Further instructions from your care team       [facility]                        Interventional Cardiology  Discharge Instructions   Post Right Heart Cath      AFTER YOU GO HOME:    DO drink plenty of fluids    DO resume your regular diet and medications unless otherwise instructed by your Primary Physician    Do Not scrub the procedure site vigorously    No lotion or powder to the puncture site for 3 days    CALL YOUR PRIMARY PHYSICIAN IF: You may resume all normal activity.  Monitor neck site for bleeding, swelling, or voice changes. If you notice bleeding or swelling immediately apply pressure to the site and call number below to speak with Cardiology Fellow.  If you  experience any changes in your breathing you should call your doctor immediately or come to the closest Emergency Department.  Do not drive yourself.    ADDITIONAL INSTRUCTIONS: Medications: You are to resume all home medications including anticoagulation therapy unless otherwise advised by your primary cardiologist or nurse coordinator.    Follow Up: Per your primary cardiology team    If you have any questions or concerns regarding your procedure site please call [phone] at anytime and ask for Cardiology Fellow on call.  They are available 24 hours a day.  You may also contact the Cardiology Clinic after hours number at [phone].                                                       Telephone Numbers [phone] Monday-Friday 8:00 am to 4:30 pm    [phone] After 4:30 pm Monday-Friday, Weekends & Holidays  Ask for Interventional Cardiologist on call. Someone is on call 24 hours/day   [facility] toll free number [phone] Monday-Friday 8:00 am to 4:30 pm   [facility] Emergency Dept [phone]                   Additional Information About Your Visit       [employer] Information    [employer] gives you secure access to your electronic health record. If you see a primary care provider, you can also send messages to your care team and make appointments. If you have questions, please call your primary care clinic.  If you do not have a primary care provider, please call [phone] and they will assist you.       Care EveryWhere ID    This is your Care EveryWhere ID. This could be used by other organizations to access your [place] medical records  [id number]        Primary Care Provider Office Phone # Fax #    Jay Steele [phone]      Equal Access to Services    [facility]: Hadii lyndsay Norris, waaxda luqtashi, qaybta veroinca villegas. So [facility] [phone].    ATENCIÓN: Si habla español, tiene a ospina disposición  servicios gratuitos de asistencia lingüística. Emiliano velázquez [phone].    We comply with applicable federal and state civil rights laws, including the [place] Human Rights Act. We do not discriminate on the basis of race, color, creed, Roman Catholic, national origin, marital status, age, disability, sex, sexual orientation, or gender identity.       Thank you!    Thank you for choosing [facility] for your care. Our goal is always to provide you with excellent care. Hearing back from our patients is one way we can continue to improve our services. Please take a few minutes to complete the written survey that you may receive in the mail after you visit with us. Thank you!            Medication List      Medications          Morning Afternoon Evening Bedtime As Needed    BD JAIME U/F 32G X 4 MM miscellaneous  Generic drug:  insulin pen needle                     coenzyme Q-10 200 MG Caps  INSTRUCTIONS:  200 mg by Oral or Feeding Tube route daily                     melatonin 5 MG tablet  INSTRUCTIONS:  Take 2 tablets (10 mg) by mouth nightly as needed for sleep                       ASK your doctor about these medications          Morning Afternoon Evening Bedtime As Needed    acetaminophen 325 MG tablet  Also known as:  TYLENOL  INSTRUCTIONS:  Take 2 tablets (650 mg) by mouth every 4 hours as needed for mild pain or fever                     albuterol 108 (90 Base) MCG/ACT inhaler  Also known as:  PROAIR HFA/PROVENTIL HFA/VENTOLIN HFA  INSTRUCTIONS:  Inhale 2 puffs into the lungs every 6 hours as needed for wheezing  Doctor's comments:  Pharmacy may dispense brand covered by insurance (Proair, or proventil or ventolin or generic albuterol inhaler)                     allopurinol 100 MG tablet  Also known as:  ZYLOPRIM  INSTRUCTIONS:  2 tablets (200 mg) by Oral or Feeding Tube route daily                     amiodarone 200 MG tablet  Also known as:  PACERONE  INSTRUCTIONS:  Take 1 tablet (200 mg) by mouth daily                      aspirin 81 MG EC tablet  Also known as:  ASA  INSTRUCTIONS:  Take 1 tablet (81 mg) by mouth daily                     atorvastatin 20 MG tablet  Also known as:  LIPITOR  INSTRUCTIONS:  1 tablet (20 mg) by Oral or Feeding Tube route every evening                     diclofenac 1 % topical gel  Also known as:  VOLTAREN  INSTRUCTIONS:  Apply 4 g topically 4 times daily as needed for moderate pain                     digoxin 125 MCG tablet  Also known as:  LANOXIN  INSTRUCTIONS:  Take 1 tablet (125 mcg) by mouth daily                     FLUoxetine 20 MG capsule  Also known as:  PROzac  INSTRUCTIONS:  Take 1 capsule (20 mg) by mouth daily                     furosemide 20 MG tablet  Also known as:  LASIX  INSTRUCTIONS:  Take 2 tablets (40 mg) by mouth daily                     hydrALAZINE 25 MG tablet  Also known as:  APRESOLINE  INSTRUCTIONS:  Take 4 tablets (100 mg) by mouth 3 times daily                     insulin glargine 100 UNIT/ML pen  Also known as:  LANTUS PEN  INSTRUCTIONS:  Inject 10 Units Subcutaneous At Bedtime  Doctor's comments:  If Lantus is not covered by insurance, may substitute Basaglar at same dose and frequency.                       lisinopril 10 MG tablet  Also known as:  ZESTRIL  INSTRUCTIONS:  Take 1 tablet (10 mg) by mouth daily                     magnesium oxide 400 MG tablet  Also known as:  MAG-OX  INSTRUCTIONS:  1 tablet (400 mg) by Oral or Feeding Tube route daily                     methocarbamol 500 MG tablet  Also known as:  ROBAXIN  INSTRUCTIONS:  Take 1 tablet (500 mg) by mouth 4 times daily as needed for muscle spasms                     MULTIPLE MINERALS-VITAMINS PO  INSTRUCTIONS:  Take 1 tablet by mouth daily                     multivitamin w/minerals tablet  INSTRUCTIONS:  Take 1 tablet by mouth daily                     ondansetron 4 MG tablet  Also known as:  ZOFRAN  INSTRUCTIONS:  Take 4 mg by mouth every 4 hours as needed for nausea                     pantoprazole  40 MG EC tablet  Also known as:  PROTONIX  INSTRUCTIONS:  Take 1 tablet (40 mg) by mouth every morning (before breakfast)                     potassium chloride ER 10 MEQ CR tablet  Also known as:  KLOR-CON M  INSTRUCTIONS:  Take 1 tablet (10 mEq) by mouth daily                     warfarin ANTICOAGULANT 4 MG tablet  Also known as:  COUMADIN  Take as directed. If you are unsure how to take this medication, talk to your nurse or doctor.  Original instructions:  Take 4-8mg daily or as directed by the coumadin clinic                     zolpidem 5 MG tablet  Also known as:  AMBIEN  INSTRUCTIONS:  Take 5 mg by mouth nightly as needed for Insomnia

## 2020-09-21 NOTE — LETTER
9/21/2020      RE: Eliseo Tanner  [address] [facility] [address]       Dear Colleague,    Thank you for the opportunity to participate in the care of your patient, Eliseo Tanner, at the [facility]. Please see a copy of my visit note below.    In person visit    HPI:   Eliseo Tanner is a 64 year old male with chronic systolic heart failure secondary to NICM now s/p HM 3 on 2/18, moderate CAD, HTN, ABHINAV on CPAP, DM2, CKD Stage III, ANA. His HM3 post-op course was complicated by retrosternal hematoma and bleeding in the lungs, RV failure,VT in ICU now on amiodarone and Afib w/AVR S/p DCCV on 2/28. He had pre-op proteus and enterococcus bacteremia from 2/13, s/p abx. He had an ICD placed on 3/16/2020. He presents today for an inperson visit for LVAD follow-up.    This visit  We have been struggling with his volume status and increasing Cr for the last few months. It has seemed that cr has been worsening despite increasing and decreasing his diuretics. Most recently, last week, he had a rapid 10 lb weight gain, we increased his diuretic and he has now returned nealrly back to his prior weight.    He is feeling more fatigued and tiered the last few days, although he attributes that to remodling his kitchen and bathroom. He is having some back pain which is chronic since his VAD implant. He has MIRANDA after a few blocks which is stable for him. No orthopnea. No PND. He wears his CPAP as instructed. No LE edema or abdominal edema. He gets some lightheadedness and dizziness if he stands up too fast. He has also had some intermittent episodes of vertigo. No palpitations, no chest pain. Appetite is fine.    No blood in the urine or blood in the stool. No prolonged nosebleeds.    No driveline erythema, pain, or drainage.    Occasional headaches, at his baseline. No stroke symptoms.     No fever or chills.    Cardiac Medications  ASA 81 mg  daily  Coumadin  Lipitor 20 mg daily  Lisinopril 10  Amiodarone 200 mg daily  Digoxin 125 mcg daily  Lasix 20 mg daily  Kcl 10 meq daily  Hydralazine 100 mg daily    PAST MEDICAL HISTORY:  No past medical history on file.    FAMILY HISTORY:  No family history on file.    SOCIAL HISTORY:  Social History     Socioeconomic History     Marital status:      Spouse name: Not on file     Number of children: Not on file     Years of education: Not on file     Highest education level: Not on file   Occupational History     Not on file   Social Needs     Financial resource strain: Not on file     Food insecurity     Worry: Not on file     Inability: Not on file     Transportation needs     Medical: Not on file     Non-medical: Not on file   Tobacco Use     Smoking status: Not on file   Substance and Sexual Activity     Alcohol use: Not on file     Drug use: Not on file     Sexual activity: Not on file   Lifestyle     Physical activity     Days per week: Not on file     Minutes per session: Not on file     Stress: Not on file   Relationships     Social connections     Talks on phone: Not on file     Gets together: Not on file     Attends Taoism service: Not on file     Active member of club or organization: Not on file     Attends meetings of clubs or organizations: Not on file     Relationship status: Not on file     Intimate partner violence     Fear of current or ex partner: Not on file     Emotionally abused: Not on file     Physically abused: Not on file     Forced sexual activity: Not on file   Other Topics Concern     Not on file   Social History Narrative     Not on file       CURRENT MEDICATIONS:  acetaminophen (TYLENOL) 325 MG tablet, Take 2 tablets (650 mg) by mouth every 4 hours as needed for mild pain or fever  allopurinol (ZYLOPRIM) 100 MG tablet, 2 tablets (200 mg) by Oral or Feeding Tube route daily  amiodarone (PACERONE) 200 MG tablet, Take 1 tablet (200 mg) by mouth daily  aspirin (ASA) 81 MG EC  tablet, Take 1 tablet (81 mg) by mouth daily  atorvastatin (LIPITOR) 20 MG tablet, 1 tablet (20 mg) by Oral or Feeding Tube route every evening  co-enzyme Q-10 200 MG CAPS, 200 mg by Oral or Feeding Tube route daily  FLUoxetine (PROZAC) 20 MG capsule, Take 1 capsule (20 mg) by mouth daily  furosemide (LASIX) 20 MG tablet, Take 2 tablets (40 mg) by mouth daily  hydrALAZINE (APRESOLINE) 25 MG tablet, Take 4 tablets (100 mg) by mouth 3 times daily  insulin glargine (LANTUS PEN) 100 UNIT/ML pen, Inject 10 Units Subcutaneous At Bedtime  insulin pen needle (BD JAIME U/F) 32G X 4 MM miscellaneous,   lisinopril (ZESTRIL) 10 MG tablet, Take 1 tablet (10 mg) by mouth daily  magnesium oxide (MAG-OX) 400 MG tablet, 1 tablet (400 mg) by Oral or Feeding Tube route daily  melatonin 5 MG tablet, Take 2 tablets (10 mg) by mouth nightly as needed for sleep  methocarbamol (ROBAXIN) 500 MG tablet, Take 1 tablet (500 mg) by mouth 4 times daily as needed for muscle spasms  MULTIPLE MINERALS-VITAMINS PO, Take 1 tablet by mouth daily  multivitamin w/minerals (THERA-VIT-M) tablet, Take 1 tablet by mouth daily  ondansetron (ZOFRAN) 4 MG tablet, Take 4 mg by mouth every 4 hours as needed for nausea  pantoprazole (PROTONIX) 40 MG EC tablet, Take 1 tablet (40 mg) by mouth every morning (before breakfast)  potassium chloride ER (KLOR-CON M) 10 MEQ CR tablet, Take 1 tablet (10 mEq) by mouth daily  warfarin ANTICOAGULANT (COUMADIN) 4 MG tablet, Take 4-8mg daily or as directed by the coumadin clinic  zolpidem (AMBIEN) 5 MG tablet, Take 5 mg by mouth nightly as needed for Insomnia  albuterol (PROAIR HFA/PROVENTIL HFA/VENTOLIN HFA) 108 (90 Base) MCG/ACT inhaler, Inhale 2 puffs into the lungs every 6 hours as needed for wheezing (Patient not taking: Reported on 6/16/2020)  diclofenac (VOLTAREN) 1 % topical gel, Apply 4 g topically 4 times daily as needed for moderate pain (Patient not taking: Reported on 6/16/2020)  digoxin (LANOXIN) 125 MCG tablet,  "Take 1 tablet (125 mcg) by mouth daily (Patient not taking: Reported on 9/21/2020)    No current facility-administered medications on file prior to visit.       ROS:   See HPI    EXAM:  BP (!) 80/0 (BP Location: Right arm, Patient Position: Chair, Cuff Size: Adult Regular)   Pulse 113   Ht 1.702 m (5' 7\")   Wt 98 kg (216 lb)   SpO2 97%   BMI 33.83 kg/m      GENERAL: Appears comfortable, in no acute distress.   HEENT: Eye symmetrical, no discharge or icterus bilaterally. Mucous membranes moist and without lesions.  CV: Hum of HM3, occasional S1S2. JVP ~10.   RESPIRATORY: Respirations regular, even, and unlabored. Lungs CTA throughout.   GI: Soft, non distended with normoactive bowel sounds. No tenderness, rebound, guarding.   EXTREMITIES: No peripheral edema. 2+ bilateral pedal pulses.   NEUROLOGIC: Alert and interacting appropriately. No focal deficits.   MUSCULOSKELETAL: No joint swelling or tenderness.   SKIN: No jaundice. No rashes or lesions. Driveline dressing c/d/i.        Labs - as reviewed in clinic with patient today:  CBC RESULTS:  Lab Results   Component Value Date    WBC 7.2 09/21/2020    RBC 5.07 09/21/2020    HGB 10.9 (L) 09/21/2020    HCT 36.8 (L) 09/21/2020    MCV 73 (L) 09/21/2020    MCH 21.5 (L) 09/21/2020    MCHC 29.6 (L) 09/21/2020    RDW 20.4 (H) 09/21/2020     09/21/2020       CMP RESULTS:  Lab Results   Component Value Date     09/21/2020    POTASSIUM 4.4 09/21/2020    CHLORIDE 106 09/21/2020    CO2 25 09/21/2020    ANIONGAP 8 09/21/2020     (H) 09/21/2020    BUN 56 (H) 09/21/2020    CR 2.26 (H) 09/21/2020    GFRESTIMATED 29 (L) 09/21/2020    GFRESTBLACK 33 (L) 09/21/2020    CALOS 8.9 09/21/2020    BILITOTAL 0.6 09/21/2020    ALBUMIN 3.8 09/21/2020    ALKPHOS 117 09/21/2020     (H) 09/21/2020     (H) 09/21/2020        INR RESULTS:  Lab Results   Component Value Date    INR 1.96 (H) 09/21/2020       Lab Results   Component Value Date    MAG 1.9 04/08/2020 "     Lab Results   Component Value Date    NTBN 12559 (H) 02/08/2020     No results found for: NTBNP    Diagnostics    7/31/2020 EKG- personally reviewed. A. Flutter at a rate of 120.     9/21/2020 RHC  RA 10/16/10 mmHg  RV 43/5 mmHg  PA 43/18/27 mmHg  PCW 18/17/14  CO (TD) 4.5 L/min  CO (Jane) 4.5 L/min  CI (TD) 2.19 L/min/m2  CI (Jane) 2.17 L/min/m2   SVR 1386 dynes   PVR 2.6 HUSAIN Right sided filling pressures are mildly elevated.Left sided filling pressures are normal. Mild elevated Pulmonary Hypertension.Left ventricular filling pressures are normal .Normal cardiac output level.Hemodynamic data has been modified in Epic per physician review.      9/8/2020 ICD check  In clinic device appointment conversion to remote device appointment to minimize COVID19 exposure for high risk patient populations.  Scheduled remote ICD transmission received and reviewed. Device transmission sent per MD orders. Patient has a Medtronic single lead ICD. Normal ICD function. 15 AF episodes recorded lasting 6 - 44 minutes. AF burden 0.2%. EGMs show irregular ventricular rhythm with morphology similar to sinus. No ventricular arrhythmias recorded. Presenting EGM = regular VS @ 105 bpm.  = 0%. OptiVol fluid index is at baseline. Estimated battery longevity to PERLA = 11 years. Battery voltage = 3.08V. No short v-v intervals recorded. Lead impedance trends appear stable. Results will be discussed with patient at appt with TIFFANIE Reich today. Plan for patient to send a remote transmission in 3 months as scheduled.  CHEPE Novak RN     Remote ICD transmission  Interpretation Summary  LVAD HM3 5500     Left ventricular size is normal. Severely reduced left ventricular systolic  function. Septum is midline  LVAD inflow or outflow cannulae not visualized. Doppler velocities are not  elevated.  Moderately reduced right ventricular systolic function.  Aortic valve appears to open during the entire cardiac cycle. Trace aortic  insufficiency  is present.  IVC is normal.  No pericardial effusion present.    4/14/2020 ECHO     CT Chest 3/4/2020:  1. Grossly stable large right apical hemothorax compared to recent  radiographs. Tiny nondisplaced fractures of the medial right first and  second ribs. Tiny right pneumothorax.   2. Intrafissural position of the right basilar chest tube abutting the  pericardium and with the tip immediately inferior to the right hilar  vasculature.   3. Small left pleural effusion, which also contains hyperdense fluid  suspicious for small component of left-sided hemothorax.  4. Stable retrosternal hematoma.  5. Cardiomegaly with small pericardial effusion versus  hemopericardium. Stable LVAD and pericardial drain.     Echo 2/25/2020:  Left ventricular size is normal. Severely reduced left ventricular systolic  function. Septum is midline  LVAD inflow or outflow cannulae not visualized. Doppler velocities are not  elevated.  Moderate to severely reduced right ventricular systolic function.  Aortic valve appears closed during the entire cardiac cycle. Trace aortic  insufficiency is present.  IVC is plethoric.  No pericardial effusion present.    Assessment and Plan:   Eliseo Tanner is a 65 year old male with chronic systolic heart failure secondary to NICM now s/p HM 3 on 2/18, moderate CAD, HTN, ABHINAV on CPAP, DM2, CKD Stage III, ANA. His HM3 post-op course was complicated by retrosternal hematoma, RV failure,VT in ICU now on amiodarone and Afib w/AVR S/p DCCV on 2/28. He had pre-op proteus and enterococcus bacteremia from 2/13, s/p abx. He had an ICD placed on 3/16/2020.      He came to clinic today due to challenging volume assessment and planned RHC. This was his first RHC since implant. His RHC showed a RA of 10 and PCW of 15. His CO/CI are preserved. His home weight today was 216, we may do better just a few pounds lower, but given his RV dysfunction I have a low suspicion that we will be able to normalize his right  sided pressures.     He is back in a. Flutter today. Discussed with EP who agrees this is a. Flutter. Rates around 120. Last ICD check on 9/8. On reviewing prior ICD checks it does appear that he has been having intermittent a.fib/a. flutter. No LVAD concerns. HE is feeling more fatigued  Today, this may be part of the reason. I will reach out to EP as he recently missed an appointment wit that team. Out of an abduance of caution, will also r/o for infection- blood cultures penidng, CRP and Lactic acid low.    His Cr continues to rise. His RA pressures is elevated, but out of proportion to his renal dysfunction.  We will refer to nephrology. His GFR is boarderline, K is stable, if Cr worses further we will need to stop the ACE.    In addition, his LFTs are elevated today. Unclear etiology but in a hepatocellular pattern. Need to consider amiodarone. Will recheck later this week and if elevating, will need to consider stopping amiodarone. Again, I will reach out to EP today to discuss.    Finally, LDH is mildly elevated today (420 from baseline 280-320). No signs of pump malfunction. No dark urine. No large bruises or hematoma. Will recheck later this week to establish trend. I have a low suspision for pump thrombosis, but will continue to consider. More likely d/t the liver dysfunction.    Chronic SCHF secondary to NICM s/p HM III with RV dysfunction. Implanted 2/18 and complicated by bleeding.   Stage D, NYHA Class IIIA  ACEi/ARB Lisinopril 10 mg daily, hydralazine 100 mg TID  BB Has been deferred given RHF, may be able to retrial in the next coming visits  Aldosterone antagonist deferred while other medical therapy is prioritized  SCD prophylaxis ICD  Fluid status Euvolemic: Continue lasix 20 mg daily and KCl 10 meq daily  MAP: Goal 65-85  LDH: 421, see discussion above  Anticoagulation: Coumadin per pharmacy.  Goal 2-3.  Antiplatelet: ASA 81 mg po daily  RV support: Continue Digoxin    AoCKD  Cr up to 12.3 from  BL of 1.3-1.5. Changes in diuretics have not led to improvements. RHC today with normal wedge, mildly elevated RA at 10, the renal dysfunction is out of proportion.  - If continues to worsen, need to stop lisinopril  - Will defer to renal for further w/u    VAD interrogation 9/21/2020  VAD interrogation reviewed with VAD coordinator. Agree with findings. Occasional PI events. No power spikes, speed drops, or other findings suspicious of pump malfunction noted.     A. Fib. Up to 120 today. VAD is tolerating. Mild symptoms.  - Will reach out to EP  - On amiodarone    SOB/Fatigue. He attributes this to doing house remodling this week. No cough. I suspect his a. Fib is contributing.  - CXR normal, CRP and Lactic Acid normal  - Blood cultures NGTD    Increased LFTS  - Recheck in 2-3 days, may need to stop amiodarone  - Will hold lipitor for now    HTN.    - Continue Lisinopril   - Continue hydralazine     New Onset Afib s/p DCCV/history of Aflutter. History of Afib w/ RVR and aberrancy vs. assisted vs. AT vs. Slow VT. S/p DCCV on 2/28 back into sinus rhythm.  - Coumadin as above.   - Continue Digoxin.     Follow up   - LDH and LFTs in 2-3 days  - Dr. Cisneros in3  months, LISA in 6 months or sooner PRN  - Referred no nepharology      BIJAL Padron TAMAS      Please do not hesitate to contact me if you have any questions/concerns.     Sincerely,     Mervat Decker PA-C

## 2020-09-21 NOTE — DISCHARGE INSTRUCTIONS
[facility]                        Interventional Cardiology  Discharge Instructions   Post Right Heart Cath      AFTER YOU GO HOME:    DO drink plenty of fluids    DO resume your regular diet and medications unless otherwise instructed by your Primary Physician    Do Not scrub the procedure site vigorously    No lotion or powder to the puncture site for 3 days    CALL YOUR PRIMARY PHYSICIAN IF: You may resume all normal activity.  Monitor neck site for bleeding, swelling, or voice changes. If you notice bleeding or swelling immediately apply pressure to the site and call number below to speak with Cardiology Fellow.  If you experience any changes in your breathing you should call your doctor immediately or come to the closest Emergency Department.  Do not drive yourself.    ADDITIONAL INSTRUCTIONS: Medications: You are to resume all home medications including anticoagulation therapy unless otherwise advised by your primary cardiologist or nurse coordinator.    Follow Up: Per your primary cardiology team    If you have any questions or concerns regarding your procedure site please call [phone] at anytime and ask for Cardiology Fellow on call.  They are available 24 hours a day.  You may also contact the Cardiology Clinic after hours number at [phone].                                                       Telephone Numbers [phone] Monday-Friday 8:00 am to 4:30 pm    [phone] After 4:30 pm Monday-Friday, Weekends & Holidays  Ask for Interventional Cardiologist on call. Someone is on call 24 hours/day   [facility] toll free number [phone] Monday-Friday 8:00 am to 4:30 pm   [facility] Emergency Dept [phone]

## 2020-09-21 NOTE — NURSING NOTE
1). PUMP DATA  Primary controller serial number: [id number]    HM 3:   Flow: 4.4 L/min,    Speed: 5500 RPMs,     PI: 3.6 ,  Power: 4.4 Driver, Hct: 36 (unable to update)     Primary controller   Back up battery: Patient use: 14, Replace in: 25  Months     Data downloaded: No   Equipment and driveline assessed for damage: Yes     Back up : Serial number: [id number]  Back up battery: Patient use: 11 Replace in: 25  Months  Programmed settings identical to the settings on the primary controller : N/A      Education complete: Yes   Charge the BACKUP controller s backup battery every 6 months  Perform a self test on BACKUP every 6 months  Change the MPU s batteries every 6 months:Yes    2). ALARMS  Alarms reported by patient since last pump evaluation: No  Alarms or other finding noted during pump data history and alarm download: Yes. Occasional PI events noted, no speed drops associated.  PI range 2.6-6.0    Action Taken:  Reviewed data with patient: Yes      3). DRESSING CHANGE / DRIVELINE ASSESSMENT  Dressing change completed today: No  Appearance of Driveline site: Per pt C/D/I. No redness.  Pt denies tenderness and drainage.  Daily dressing in place.      Driveline stabilization: Method: [employer]  [ Teaching reinforced on need for stabilization of Driveline. ]

## 2020-09-21 NOTE — PROGRESS NOTES
In person visit    HPI:   Eliseo Tanner is a 64 year old male with chronic systolic heart failure secondary to NICM now s/p HM 3 on 2/18, moderate CAD, HTN, ABHINAV on CPAP, DM2, CKD Stage III, ANA. His HM3 post-op course was complicated by retrosternal hematoma and bleeding in the lungs, RV failure,VT in ICU now on amiodarone and Afib w/AVR S/p DCCV on 2/28. He had pre-op proteus and enterococcus bacteremia from 2/13, s/p abx. He had an ICD placed on 3/16/2020. He presents today for an inperson visit for LVAD follow-up.    This visit  We have been struggling with his volume status and increasing Cr for the last few months. It has seemed that cr has been worsening despite increasing and decreasing his diuretics. Most recently, last week, he had a rapid 10 lb weight gain, we increased his diuretic and he has now returned nealrly back to his prior weight.    He is feeling more fatigued and tiered the last few days, although he attributes that to remodling his kitchen and bathroom. He is having some back pain which is chronic since his VAD implant. He has MIRANDA after a few blocks which is stable for him. No orthopnea. No PND. He wears his CPAP as instructed. No LE edema or abdominal edema. He gets some lightheadedness and dizziness if he stands up too fast. He has also had some intermittent episodes of vertigo. No palpitations, no chest pain. Appetite is fine.    No blood in the urine or blood in the stool. No prolonged nosebleeds.    No driveline erythema, pain, or drainage.    Occasional headaches, at his baseline. No stroke symptoms.     No fever or chills.    Cardiac Medications  ASA 81 mg daily  Coumadin  Lipitor 20 mg daily  Lisinopril 10  Amiodarone 200 mg daily  Digoxin 125 mcg daily  Lasix 20 mg daily  Kcl 10 meq daily  Hydralazine 100 mg daily    PAST MEDICAL HISTORY:  No past medical history on file.    FAMILY HISTORY:  No family history on file.    SOCIAL HISTORY:  Social History     Socioeconomic History      Marital status:      Spouse name: Not on file     Number of children: Not on file     Years of education: Not on file     Highest education level: Not on file   Occupational History     Not on file   Social Needs     Financial resource strain: Not on file     Food insecurity     Worry: Not on file     Inability: Not on file     Transportation needs     Medical: Not on file     Non-medical: Not on file   Tobacco Use     Smoking status: Not on file   Substance and Sexual Activity     Alcohol use: Not on file     Drug use: Not on file     Sexual activity: Not on file   Lifestyle     Physical activity     Days per week: Not on file     Minutes per session: Not on file     Stress: Not on file   Relationships     Social connections     Talks on phone: Not on file     Gets together: Not on file     Attends Buddhism service: Not on file     Active member of club or organization: Not on file     Attends meetings of clubs or organizations: Not on file     Relationship status: Not on file     Intimate partner violence     Fear of current or ex partner: Not on file     Emotionally abused: Not on file     Physically abused: Not on file     Forced sexual activity: Not on file   Other Topics Concern     Not on file   Social History Narrative     Not on file       CURRENT MEDICATIONS:  acetaminophen (TYLENOL) 325 MG tablet, Take 2 tablets (650 mg) by mouth every 4 hours as needed for mild pain or fever  allopurinol (ZYLOPRIM) 100 MG tablet, 2 tablets (200 mg) by Oral or Feeding Tube route daily  amiodarone (PACERONE) 200 MG tablet, Take 1 tablet (200 mg) by mouth daily  aspirin (ASA) 81 MG EC tablet, Take 1 tablet (81 mg) by mouth daily  atorvastatin (LIPITOR) 20 MG tablet, 1 tablet (20 mg) by Oral or Feeding Tube route every evening  co-enzyme Q-10 200 MG CAPS, 200 mg by Oral or Feeding Tube route daily  FLUoxetine (PROZAC) 20 MG capsule, Take 1 capsule (20 mg) by mouth daily  furosemide (LASIX) 20 MG tablet, Take 2  "tablets (40 mg) by mouth daily  hydrALAZINE (APRESOLINE) 25 MG tablet, Take 4 tablets (100 mg) by mouth 3 times daily  insulin glargine (LANTUS PEN) 100 UNIT/ML pen, Inject 10 Units Subcutaneous At Bedtime  insulin pen needle (BD JAIME U/F) 32G X 4 MM miscellaneous,   lisinopril (ZESTRIL) 10 MG tablet, Take 1 tablet (10 mg) by mouth daily  magnesium oxide (MAG-OX) 400 MG tablet, 1 tablet (400 mg) by Oral or Feeding Tube route daily  melatonin 5 MG tablet, Take 2 tablets (10 mg) by mouth nightly as needed for sleep  methocarbamol (ROBAXIN) 500 MG tablet, Take 1 tablet (500 mg) by mouth 4 times daily as needed for muscle spasms  MULTIPLE MINERALS-VITAMINS PO, Take 1 tablet by mouth daily  multivitamin w/minerals (THERA-VIT-M) tablet, Take 1 tablet by mouth daily  ondansetron (ZOFRAN) 4 MG tablet, Take 4 mg by mouth every 4 hours as needed for nausea  pantoprazole (PROTONIX) 40 MG EC tablet, Take 1 tablet (40 mg) by mouth every morning (before breakfast)  potassium chloride ER (KLOR-CON M) 10 MEQ CR tablet, Take 1 tablet (10 mEq) by mouth daily  warfarin ANTICOAGULANT (COUMADIN) 4 MG tablet, Take 4-8mg daily or as directed by the coumadin clinic  zolpidem (AMBIEN) 5 MG tablet, Take 5 mg by mouth nightly as needed for Insomnia  albuterol (PROAIR HFA/PROVENTIL HFA/VENTOLIN HFA) 108 (90 Base) MCG/ACT inhaler, Inhale 2 puffs into the lungs every 6 hours as needed for wheezing (Patient not taking: Reported on 6/16/2020)  diclofenac (VOLTAREN) 1 % topical gel, Apply 4 g topically 4 times daily as needed for moderate pain (Patient not taking: Reported on 6/16/2020)  digoxin (LANOXIN) 125 MCG tablet, Take 1 tablet (125 mcg) by mouth daily (Patient not taking: Reported on 9/21/2020)    No current facility-administered medications on file prior to visit.       ROS:   See HPI    EXAM:  BP (!) 80/0 (BP Location: Right arm, Patient Position: Chair, Cuff Size: Adult Regular)   Pulse 113   Ht 1.702 m (5' 7\")   Wt 98 kg (216 lb)   " SpO2 97%   BMI 33.83 kg/m      GENERAL: Appears comfortable, in no acute distress.   HEENT: Eye symmetrical, no discharge or icterus bilaterally. Mucous membranes moist and without lesions.  CV: Hum of HM3, occasional S1S2. JVP ~10.   RESPIRATORY: Respirations regular, even, and unlabored. Lungs CTA throughout.   GI: Soft, non distended with normoactive bowel sounds. No tenderness, rebound, guarding.   EXTREMITIES: No peripheral edema. 2+ bilateral pedal pulses.   NEUROLOGIC: Alert and interacting appropriately. No focal deficits.   MUSCULOSKELETAL: No joint swelling or tenderness.   SKIN: No jaundice. No rashes or lesions. Driveline dressing c/d/i.        Labs - as reviewed in clinic with patient today:  CBC RESULTS:  Lab Results   Component Value Date    WBC 7.2 09/21/2020    RBC 5.07 09/21/2020    HGB 10.9 (L) 09/21/2020    HCT 36.8 (L) 09/21/2020    MCV 73 (L) 09/21/2020    MCH 21.5 (L) 09/21/2020    MCHC 29.6 (L) 09/21/2020    RDW 20.4 (H) 09/21/2020     09/21/2020       CMP RESULTS:  Lab Results   Component Value Date     09/21/2020    POTASSIUM 4.4 09/21/2020    CHLORIDE 106 09/21/2020    CO2 25 09/21/2020    ANIONGAP 8 09/21/2020     (H) 09/21/2020    BUN 56 (H) 09/21/2020    CR 2.26 (H) 09/21/2020    GFRESTIMATED 29 (L) 09/21/2020    GFRESTBLACK 33 (L) 09/21/2020    CALOS 8.9 09/21/2020    BILITOTAL 0.6 09/21/2020    ALBUMIN 3.8 09/21/2020    ALKPHOS 117 09/21/2020     (H) 09/21/2020     (H) 09/21/2020        INR RESULTS:  Lab Results   Component Value Date    INR 1.96 (H) 09/21/2020       Lab Results   Component Value Date    MAG 1.9 04/08/2020     Lab Results   Component Value Date    NTBNPI 12,559 (H) 02/08/2020     No results found for: NTBNP    Diagnostics    7/31/2020 EKG- personally reviewed. A. Flutter at a rate of 120.     9/21/2020 RHC  RA 10/16/10 mmHg  RV 43/5 mmHg  PA 43/18/27 mmHg  PCW 18/17/14  CO (TD) 4.5 L/min  CO (Jane) 4.5 L/min  CI (TD) 2.19 L/min/m2  CI  (Jane) 2.17 L/min/m2   SVR 1386 dynes   PVR 2.6 HUSAIN Right sided filling pressures are mildly elevated.Left sided filling pressures are normal. Mild elevated Pulmonary Hypertension.Left ventricular filling pressures are normal .Normal cardiac output level.Hemodynamic data has been modified in Epic per physician review.      9/8/2020 ICD check  In clinic device appointment conversion to remote device appointment to minimize COVID19 exposure for high risk patient populations.  Scheduled remote ICD transmission received and reviewed. Device transmission sent per MD orders. Patient has a Medtronic single lead ICD. Normal ICD function. 15 AF episodes recorded lasting 6 - 44 minutes. AF burden 0.2%. EGMs show irregular ventricular rhythm with morphology similar to sinus. No ventricular arrhythmias recorded. Presenting EGM = regular VS @ 105 bpm.  = 0%. OptiVol fluid index is at baseline. Estimated battery longevity to PERLA = 11 years. Battery voltage = 3.08V. No short v-v intervals recorded. Lead impedance trends appear stable. Results will be discussed with patient at appt with TIFFANIE Reich today. Plan for patient to send a remote transmission in 3 months as scheduled.  CHEPE Novak RN     Remote ICD transmission  Interpretation Summary  LVAD HM3 5500     Left ventricular size is normal. Severely reduced left ventricular systolic  function. Septum is midline  LVAD inflow or outflow cannulae not visualized. Doppler velocities are not  elevated.  Moderately reduced right ventricular systolic function.  Aortic valve appears to open during the entire cardiac cycle. Trace aortic  insufficiency is present.  IVC is normal.  No pericardial effusion present.    4/14/2020 ECHO     CT Chest 3/4/2020:  1. Grossly stable large right apical hemothorax compared to recent  radiographs. Tiny nondisplaced fractures of the medial right first and  second ribs. Tiny right pneumothorax.   2. Intrafissural position of the right basilar  chest tube abutting the  pericardium and with the tip immediately inferior to the right hilar  vasculature.   3. Small left pleural effusion, which also contains hyperdense fluid  suspicious for small component of left-sided hemothorax.  4. Stable retrosternal hematoma.  5. Cardiomegaly with small pericardial effusion versus  hemopericardium. Stable LVAD and pericardial drain.     Echo 2/25/2020:  Left ventricular size is normal. Severely reduced left ventricular systolic  function. Septum is midline  LVAD inflow or outflow cannulae not visualized. Doppler velocities are not  elevated.  Moderate to severely reduced right ventricular systolic function.  Aortic valve appears closed during the entire cardiac cycle. Trace aortic  insufficiency is present.  IVC is plethoric.  No pericardial effusion present.    Assessment and Plan:   Eliseo Tanner is a 65 year old male with chronic systolic heart failure secondary to NICM now s/p HM 3 on 2/18, moderate CAD, HTN, ABHINAV on CPAP, DM2, CKD Stage III, ANA. His HM3 post-op course was complicated by retrosternal hematoma, RV failure,VT in ICU now on amiodarone and Afib w/AVR S/p DCCV on 2/28. He had pre-op proteus and enterococcus bacteremia from 2/13, s/p abx. He had an ICD placed on 3/16/2020.      He came to clinic today due to challenging volume assessment and planned RHC. This was his first RHC since implant. His RHC showed a RA of 10 and PCW of 15. His CO/CI are preserved. His home weight today was 216, we may do better just a few pounds lower, but given his RV dysfunction I have a low suspicion that we will be able to normalize his right sided pressures.     He is back in a. Flutter today. Discussed with EP who agrees this is a. Flutter. Rates around 120. Last ICD check on 9/8. On reviewing prior ICD checks it does appear that he has been having intermittent a.fib/a. flutter. No LVAD concerns. HE is feeling more fatigued  Today, this may be part of the reason. I  will reach out to EP as he recently missed an appointment wit that team. Out of an abduance of caution, will also r/o for infection- blood cultures penidng, CRP and Lactic acid low.    His Cr continues to rise. His RA pressures is elevated, but out of proportion to his renal dysfunction.  We will refer to nephrology. His GFR is boarderline, K is stable, if Cr worses further we will need to stop the ACE.    In addition, his LFTs are elevated today. Unclear etiology but in a hepatocellular pattern. Need to consider amiodarone. Will recheck later this week and if elevating, will need to consider stopping amiodarone. Again, I will reach out to EP today to discuss.    Finally, LDH is mildly elevated today (420 from baseline 280-320). No signs of pump malfunction. No dark urine. No large bruises or hematoma. Will recheck later this week to establish trend. I have a low suspision for pump thrombosis, but will continue to consider. More likely d/t the liver dysfunction.    Chronic SCHF secondary to NICM s/p HM III with RV dysfunction. Implanted 2/18 and complicated by bleeding.   Stage D, NYHA Class IIIA  ACEi/ARB Lisinopril 10 mg daily, hydralazine 100 mg TID  BB Has been deferred given RHF, may be able to retrial in the next coming visits  Aldosterone antagonist deferred while other medical therapy is prioritized  SCD prophylaxis ICD  Fluid status Euvolemic: Continue lasix 20 mg daily and KCl 10 meq daily  MAP: Goal 65-85  LDH: 421, see discussion above  Anticoagulation: Coumadin per pharmacy.  Goal 2-3.  Antiplatelet: ASA 81 mg po daily  RV support: Continue Digoxin    AoCKD  Cr up to 12.3 from BL of 1.3-1.5. Changes in diuretics have not led to improvements. RHC today with normal wedge, mildly elevated RA at 10, the renal dysfunction is out of proportion.  - If continues to worsen, need to stop lisinopril  - Will defer to renal for further w/u    VAD interrogation 9/21/2020  VAD interrogation reviewed with VAD  coordinator. Agree with findings. Occasional PI events. No power spikes, speed drops, or other findings suspicious of pump malfunction noted.     A. Fib. Up to 120 today. VAD is tolerating. Mild symptoms.  - Will reach out to EP  - On amiodarone    SOB/Fatigue. He attributes this to doing house remodling this week. No cough. I suspect his a. Fib is contributing.  - CXR normal, CRP and Lactic Acid normal  - Blood cultures NGTD    Increased LFTS  - Recheck in 2-3 days, may need to stop amiodarone  - Will hold lipitor for now    HTN.    - Continue Lisinopril   - Continue hydralazine     New Onset Afib s/p DCCV/history of Aflutter. History of Afib w/ RVR and aberrancy vs. NATHALIA vs. AT vs. Slow VT. S/p DCCV on 2/28 back into sinus rhythm.  - Coumadin as above.   - Continue Digoxin.     Follow up   - LDH and LFTs in 2-3 days  - Dr. Cisneros in3  months, LISA in 6 months or sooner PRN  - Referred no nepharology      BIJAL Padron TAMAS

## 2020-09-21 NOTE — PATIENT INSTRUCTIONS
Medications:  1.  Meclizine (as a trial for your dizziness) take 1/2 tab to start and do not take at the same time as Ambien.    Follow-up:  1.  Please schedule appt with Dr. Cisneros in 3 months with labs and a device check prior to  2.  Please schedule appt with CORE in 6 months with labs and device check as well  3.  You were referred to Nephrology (the kidney doctors) today.  They will call you to arrange an appointment.   4.  Please have your LDH redrawn this Thursday.  Gladys will send orders today    Instructions:  1.  Please have labs drawn today.  2.  We are checking a CXR because of your shortness of breath at rest  3.  We are doing an EKG to make sure your heart is not racing  4.  Follow up with primary care MD regarding your: sleeping meds, dizziness and your back pain.    Page the VAD Coordinator on call if you gain more than 3 lb in a day or 5 in a week. Please also page if you feel unwell or have alarms.     Great to see you in clinic today. To Page the VAD Coordinator on call, dial [phone] option #4 and ask to speak to the VAD coordinator on call.

## 2020-09-21 NOTE — IP AVS SNAPSHOT
[address] [address]                                    After Visit Summary   9/21/2020    Eliseo Tanner    MRN: [id number]           After Visit Summary Signature Page    I have received my discharge instructions, and my questions have been answered. I have discussed any challenges I see with this plan with the nurse or doctor.    ..........................................................................................................................................  Patient/Patient Representative Signature      ..........................................................................................................................................  Patient Representative Print Name and Relationship to Patient    ..................................................               ................................................  Date                                   Time    ..........................................................................................................................................  Reviewed by Signature/Title    ...................................................              ..............................................  Date                                               Time          22EPIC Rev 08/18

## 2020-09-21 NOTE — NURSING NOTE
Chief Complaint   Patient presents with     Follow Up     ump 6 month post vad     Vitals were taken and medications were reconciled.  Anne Cevallos  12:57 PM

## 2020-09-21 NOTE — PROGRESS NOTES
Pt back on 2A post Right Heart Cath; pt awake and alert; family at bedside. R neck site is dry and intact, no swelling or hematoma. LVAD numbers:  Speed 5500; Flow 4.4; PI 3.5; Power 4.4.  Discharge instructions reviewed by Julianne Gupta RN; pt stated understanding, clarified he has a copy. Taking PO OJ. Will discharge with family when ready, no sedation for procedure.

## 2020-09-21 NOTE — PROGRESS NOTES
[facility]   Interventional Cardiology        Consenting/Education for Right Heart Catheterization      Patient understands during the portion for right heart catheterization a fine tube (catheter) is put into the vein of the groin/neck.  It is carefully passed along until it reaches the heart and then goes up into the blood vessels of the lungs. This is done to measure a variety of pressures in your heart and can tell us how well the heart is filling and emptying, as well as monitor fluid status.     Patient also understands risks and complications of the procedure which include, but are not limited to bruising/swelling around the incision site, infection, bleeding, allergic reaction to local anesthetic, air embolism, arterial puncture, stroke, heart attack.     Patient verbalized understanding of risks and benefits of the right heart catheterization and has elected to proceed with the procedure.         Daniel Adan PA-C  [facility] Cardiology Team

## 2020-09-21 NOTE — LETTER
Patient Name: Eliseo Tanner   : 1955   Diagnosis/ICD-10: Heart Failure, unspecified I50.9; LVAD Z95.811   Requesting Physician: Dr. Nader Cisneros   Date of Request: 20   Date to Draw      **Please fax results to Gladys VANESSA RN VAD Coord at [phone]. Call [phone] with Questions    ORDER CODE TESTS NANCY.VOL.    CBASIC Na, K, Cl, CO2, Crea, BUN, Glu, Ca GG 0.5-1    BHEPAT Alb, AlkP, ALT, AST, BBil, TP GG 0.5-1    BLIP Chol, Trig, HDL, LDL GG 1-2    CCOMP Na, K, Cl, CO2, Crea, BUN, Glu, Ca, Alb, AlkP, ALT, AST, Bbil, TP,  GG 0.6-1    HGP CBC & Platelet P 0.3-1    HGDP CBC, Differential & Platelet P 0.3-1    PLT Platelet  P 0.3-1    INR INR B 2.7    PTT PTT B 2.7   X LD Lactate Dehydrogenase GG 0.3-1    PHGB Plasma Hemoglobin GG 2-3    Pre-Albumin Pre-Albumin RG 1.0     D Dimer     BNP N terminal pro BNP      COVID-19 (Coronavirus) by PCR       Signed,      Nader Cisneros MD  Heart Failure, Mechanical Circulatory Support and Transplant Cardiology   of Medicine,  Division of Cardiology, [facility]

## 2020-09-22 NOTE — PROGRESS NOTES
Rcvd instruction from Karolina MATHIAS to have pt stop Lipitor r/t elevated LFT levels from yesterday.  Pt advised and verbalized understanding.  WIll call coordinator with questions.    Pt arranged to see EP 10/7, will arrange neph asap and will redraw LDH as ordered Thursday.

## 2020-09-25 ENCOUNTER — CARE COORDINATION (OUTPATIENT)
Dept: CARDIOLOGY | Facility: CLINIC | Age: 65
End: 2020-09-25

## 2020-09-25 NOTE — PROGRESS NOTES
D: Patient got f/u LDH yesterday.  I:  (down from 421). Discussed with Karolina. Karolina would like LFTs drawn as well. Writer called lab and send order. Lab stated they could add-on LFTs. Called patient to inform him of lab results. Pt was not home. Spoke to wife.   P: Will wait for LFTs to results and then primary VAD coord will follow up with patient.  VAD Coord on-call available 24/7 for questions/concerns.

## 2020-09-25 NOTE — LETTER
Patient Name: Eliseo Tanner   : 1955   Diagnosis/ICD-10: Heart Failure, unspecified I50.9; LVAD Z95.811   Requesting Physician: Dr. Nader Cisneros   Date of Request: 20   Date to Draw Add-on from yesterdays lab     **Please fax results to Gladys Mccarthy RN VAD Coord at [phone]. Call [phone] with Questions    ORDER CODE TESTS NANCY.VOL.    CBASIC Na, K, Cl, CO2, Crea, BUN, Glu, Ca GG 0.5-1   X BHEPAT Alb, AlkP, ALT, AST, BBil, TP GG 0.5-1    BLIP Chol, Trig, HDL, LDL GG 1-2    CCOMP Na, K, Cl, CO2, Crea, BUN, Glu, Ca, Alb, AlkP, ALT, AST, Bbil, TP,  GG 0.6-1    HGP CBC & Platelet P 0.3-1    HGDP CBC, Differential & Platelet P 0.3-1    PLT Platelet  P 0.3-1    INR INR B 2.7    PTT PTT B 2.7    LD Lactate Dehydrogenase GG 0.3-1    PHGB Plasma Hemoglobin GG 2-3    Pre-Albumin Pre-Albumin RG 1.0                  Signed,      Nader Cisneros MD  Heart Failure, Mechanical Circulatory Support and Transplant Cardiology   of Medicine,  Division of Cardiology, [facility]

## 2020-09-27 LAB
BACTERIA SPEC CULT: NO GROWTH
BACTERIA SPEC CULT: NO GROWTH
SPECIMEN SOURCE: NORMAL
SPECIMEN SOURCE: NORMAL

## 2020-09-30 NOTE — TELEPHONE ENCOUNTER
RECORDS RECEIVED FROM: Internal - Chronic systolic congestive heart failure (H) [I50.22]  LVAD (left ventricular assist device) present (H) [Z95.811]   DATE RECEIVED: 10.30.2020   NOTES STATUS DETAILS   OFFICE NOTE from referring provider Internal 09.21.2020 Mervat Decker PA-C    OFFICE NOTE from other specialist  Internal 04.02.2020 Mac Jaramillo MD   *Only VASCULITIS or LUPUS gather office notes for the following N/A    *PULMONARY   N/A    *ENT N/A    *DERMATOLOGY N/A    *RHEUMATOLOGY N/A    DISCHARGE SUMMARY from hospital Internal 09.21.2020 Dalton Baeza MD    02.06.2020    DISCHARGE REPORT from the ER N/A    MEDICATION LIST Internal /CE    IMAGING  (NEED IMAGES AND REPORTS)     KIDNEY CT SCAN Internal 02.08.2020 CT CHEST ABDOMEN PELVIS W/O CONTRAST   KIDNEY ULTRASOUND Internal 02.10.2020 US SENTHIL Doppler    02.08.2020 US ABDOMEN COMPLETE   MR ABDOMEN N/A    NUCLEAR MEDICINE RENAL N/A    LABS     CBC Internal 09.21.2020   CMP Internal 09.21.2020   BMP Internal 09.21.2020   UA Internal 02.07.2020   URINE PROTEIN Internal 02.07.2020   RENAL PANEL Care Everywhere 09.05.2018   BIOPSY     KIDNEY BIOPSY  N/A

## 2020-10-02 ENCOUNTER — ANTICOAGULATION THERAPY VISIT (OUTPATIENT)
Dept: ANTICOAGULATION | Facility: CLINIC | Age: 65
End: 2020-10-02

## 2020-10-02 DIAGNOSIS — I50.22 CHRONIC SYSTOLIC CONGESTIVE HEART FAILURE (H): ICD-10-CM

## 2020-10-02 DIAGNOSIS — Z95.811 LVAD (LEFT VENTRICULAR ASSIST DEVICE) PRESENT (H): ICD-10-CM

## 2020-10-02 LAB — INR PPP: 2.3 (ref 0.9–1.1)

## 2020-10-02 NOTE — PROGRESS NOTES
ANTICOAGULATION FOLLOW-UP CLINIC VISIT    Patient Name:  Eliseo Tanner  Date:  10/2/2020  Contact Type:  Telephone    SUBJECTIVE:  Patient Findings     Positives:  Change in medications    Comments:  Lipitor was discontinue on  due to pt's kidney functions         Clinical Outcomes     Comments:  Lipitor was discontinue on  due to pt's kidney functions            OBJECTIVE    Recent labs: (last 7 days)     10/02/20   INR 2.3*       ASSESSMENT / PLAN  INR assessment THER    Recheck INR In: 2 WEEKS    INR Location Outside lab      Anticoagulation Summary  As of 10/2/2020    INR goal:  2.0-3.0   TTR:  92.5 % (6.3 mo)   INR used for dosin.3 (10/2/2020)   Warfarin maintenance plan:  4 mg (4 mg x 1) every day   Full warfarin instructions:  4 mg every day   Weekly warfarin total:  28 mg   No change documented:  Luiza Perkins, RN   Plan last modified:  Krysta Mcdaniel RN (2020)   Next INR check:  10/16/2020   Priority:  Critical   Target end date:  Indefinite    Indications    LVAD (left ventricular assist device) present (H) [Z95.811]  Chronic systolic congestive heart failure (H) [I50.22]             Anticoagulation Episode Summary     INR check location:      Preferred lab:      Send INR reminders to:  [facility]    Comments:  Patient on Amiodarone +++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 RICHARDS's NOTE 2/15/20++++  HM 3  placed 2020, 81mg ASA, Speak to spouseVeronique at [phone]  2020:  Lab: [facility]:  ph: [phone] opt 5;   fax: [phone]      Anticoagulation Care Providers     Provider Role Specialty Phone number    Nader Cisneros MD Referring Cardiology [phone]            See the Encounter Report to view Anticoagulation Flowsheet and Dosing Calendar (Go to Encounters tab in chart review, and find the Anticoagulation Therapy Visit)    Spoke with spouse Veronique    Patient had LVAD placed on:   20  Type of LVAD: HM 3  Patient's current  Aspirin dose: ASA 81mg Daily  LVAD Protocol followed: Yes   If Not Followed Explanation:  N/A    Luiza Perkins RN

## 2020-10-07 ENCOUNTER — VIRTUAL VISIT (OUTPATIENT)
Dept: CARDIOLOGY | Facility: CLINIC | Age: 65
End: 2020-10-07
Attending: NURSE PRACTITIONER
Payer: MEDICARE

## 2020-10-07 DIAGNOSIS — I42.8 NICM (NONISCHEMIC CARDIOMYOPATHY) (H): ICD-10-CM

## 2020-10-07 DIAGNOSIS — Z79.899 ON AMIODARONE THERAPY: Primary | ICD-10-CM

## 2020-10-07 DIAGNOSIS — Z95.810 S/P ICD (INTERNAL CARDIAC DEFIBRILLATOR) PROCEDURE: ICD-10-CM

## 2020-10-07 PROCEDURE — 99214 OFFICE O/P EST MOD 30 MIN: CPT | Mod: 95 | Performed by: NURSE PRACTITIONER

## 2020-10-07 NOTE — PROGRESS NOTES
"Electrophysiology Clinic Video Virtual Visit    Eliseo Tanner is a 65 year old male who is being evaluated via a billable video visit.      The patient has been notified of following:     \"This video visit will be conducted via a call between you and your physician/provider. We have found that certain health care needs can be provided without the need for an in-person physical exam.  This service lets us provide the care you need with a video conversation.  If a prescription is necessary we can send it directly to your pharmacy.  If lab work is needed we can place an order for that and you can then stop by our lab to have the test done at a later time.    If during the course of the call the physician/provider feels a video visit is not appropriate, you will not be charged for this service.\"     Physician/provider has received verbal consent for a Video Visit from the patient? Yes  Pt will like to use [employer] phone #[phone]    HPI:   Mr. Tanner is a 65 year old male who has a past medical history significant for NICM LVEF 15-20% s/p LVAD HM3 2/18/20, moderate nonobstructive CAD, severe MR, HTN, ABHINAV (uses CPAP), DM2, CKD III, HLD, ANA, and prior tobacco use.   He started having worsening SOB/MIRANDA in 9/2019 which progressively worsened. In 10/2019 he was admitted to OSH and was found to have newly reduced LVEF down to 25-30% with severe MR. He was diuresed about 8 lbs until his SCr bumped. He was started on HF medications. He underwent a CHAMP which showed severe MR and then a coronary angiogram which showed mild/moderate CAD with severe branch disease and elevated LVEDP. He was admitted and underwent diuresis again. He was then admitted in 1/2020 with HF exacerbation found to be in cardiogenic shock. He was started on Milrinone and underwent diuresis. He was ultimately, very slowly weaned off the milrinone with stable symptoms and labs. SCr at discharge was 1.4 and then on 1/29/20 it had rapid worsening of " creat to 2.6. Therefore the decision was made to admit to restart milrinone infusion. Milrinone was restarted and titrated to 0.5 mcg/kg/min. His renal function improved. His Entresto dose was decreased due to lower BP's. PICC line was placed and he was discharged with home milrinone. He then presented for follow up with Cardiology and was feeling poorly, weight gain, feeling SOB, worsening edema, and felt heart racing. He also reported poor appetite as well. He was then sent here for admission. Prior to arrival to floor he was noted to be in Vfib and required shock x 3. He was loaded with amiodarone, ASA 324mg. He ultimately required LVAD placement. His postoperative course was complicated by retrosternal hematoma and bleeding in the lungs, RV failure, ANA, VT/VF, and Afib w/AVR S/p DCCV on 2/28.  He then had ICD implanted on 3/16/20 as secondary prevention of SCD. He has been having increasing SCr over the last few months.  It has seemed that cr has been worsening despite increasing and decreasing his diuretics. Most recently, last week, he had a rapid 10 lb weight gain, we increased his diuretic and he has now returned nealrly back to his prior weight.    He is following up today. Device site well healed. He reports feeling well. He denies any chest pain/pressures, dizziness, lightheadedness, worsening shortness of breath, leg/ankle swelling, PND, orthopnea, palpitations, or syncopal symptoms. No LVAD alarms, no weight increases or edema. Recent device transmission shows normal ICD function. 15 AF episodes recorded lasting 6 - 44 minutes. AF burden 0.2%. EGMs show irregular ventricular rhythm with morphology similar to sinus. No ventricular arrhythmias recorded. Presenting EGM = regular VS @ 105 bpm.  = 0%. OptiVol fluid index is at baseline. Estimated battery longevity to PERLA = 11 years. Battery voltage = 3.08V. No short v-v intervals recorded. Lead impedance trends appear stable. LT 9/24/20 WDL and TFT  2/28/20 [facility] Current cardiac medications include: ASA, Warfarin, Lipitor, Lisinopril, Amiodarone, Digoxin, Lasix, and Hydralazine.     PAST MEDICAL HISTORY:  As per HPI    CURRENT MEDICATIONS:  Current Outpatient Medications   Medication Sig Dispense Refill     acetaminophen (TYLENOL) 325 MG tablet Take 2 tablets (650 mg) by mouth every 4 hours as needed for mild pain or fever 100 tablet 0     albuterol (PROAIR HFA/PROVENTIL HFA/VENTOLIN HFA) 108 (90 Base) MCG/ACT inhaler Inhale 2 puffs into the lungs every 6 hours as needed for wheezing (Patient not taking: Reported on 6/16/2020) 1 Inhaler 1     allopurinol (ZYLOPRIM) 100 MG tablet 2 tablets (200 mg) by Oral or Feeding Tube route daily 60 tablet 1     amiodarone (PACERONE) 200 MG tablet Take 1 tablet (200 mg) by mouth daily 90 tablet 3     aspirin (ASA) 81 MG EC tablet Take 1 tablet (81 mg) by mouth daily 90 tablet 3     co-enzyme Q-10 200 MG CAPS 200 mg by Oral or Feeding Tube route daily       diclofenac (VOLTAREN) 1 % topical gel Apply 4 g topically 4 times daily as needed for moderate pain (Patient not taking: Reported on 6/16/2020) 1 Tube 1     FLUoxetine (PROZAC) 20 MG capsule Take 1 capsule (20 mg) by mouth daily 30 capsule 1     furosemide (LASIX) 20 MG tablet Take 2 tablets (40 mg) by mouth daily 90 tablet 3     hydrALAZINE (APRESOLINE) 25 MG tablet Take 4 tablets (100 mg) by mouth 3 times daily 810 tablet 3     insulin glargine (LANTUS PEN) 100 UNIT/ML pen Inject 10 Units Subcutaneous At Bedtime 3 mL 1     insulin pen needle (BD JAIME U/F) 32G X 4 MM miscellaneous        lisinopril (ZESTRIL) 10 MG tablet Take 1 tablet (10 mg) by mouth daily 90 tablet 3     magnesium oxide (MAG-OX) 400 MG tablet 1 tablet (400 mg) by Oral or Feeding Tube route daily 30 tablet 1     meclizine (ANTIVERT) 25 MG tablet Take 0.5 tablets (12.5 mg) by mouth 3 times daily as needed for dizziness 3 tablet 0     melatonin 5 MG tablet Take 2 tablets (10 mg) by mouth nightly as needed  for sleep       methocarbamol (ROBAXIN) 500 MG tablet Take 1 tablet (500 mg) by mouth 4 times daily as needed for muscle spasms 30 tablet 0     MULTIPLE MINERALS-VITAMINS PO Take 1 tablet by mouth daily       multivitamin w/minerals (THERA-VIT-M) tablet Take 1 tablet by mouth daily 30 tablet 1     ondansetron (ZOFRAN) 4 MG tablet Take 4 mg by mouth every 4 hours as needed for nausea       pantoprazole (PROTONIX) 40 MG EC tablet Take 1 tablet (40 mg) by mouth every morning (before breakfast) 30 tablet 0     potassium chloride ER (KLOR-CON M) 10 MEQ CR tablet Take 1 tablet (10 mEq) by mouth daily 30 tablet      warfarin ANTICOAGULANT (COUMADIN) 4 MG tablet Take 4-8mg daily or as directed by the coumadin clinic 150 tablet 3     zolpidem (AMBIEN) 5 MG tablet Take 5 mg by mouth nightly as needed for Insomnia         PAST SURGICAL HISTORY:  Past Surgical History:   Procedure Laterality Date     ANESTHESIA CARDIOVERSION N/A 2/28/2020    Procedure: ANESTHESIA, FOR CARDIOVERSION;  Surgeon: GENERIC ANESTHESIA PROVIDER;  Location: [facility] OR     CV CENTRAL VENOUS CATHETER PLACEMENT N/A 2/13/2020    Procedure: Central Venous Catheter Placement;  Surgeon: Chente Moss MD;  Location:  HEART CARDIAC CATH LAB     CV INTRA-AORTIC BALLOON PUMP INSERTION N/A 2/7/2020    Procedure: Intra-Aortic Balloon Pump Insertion;  Surgeon: Jose Baldwin MD;  Location:  HEART CARDIAC CATH LAB     CV INTRA-AORTIC BALLOON PUMP INSERTION N/A 2/13/2020    Procedure: Intra-Aortic Balloon Pump Insertion;  Surgeon: Chente Moss MD;  Location:  HEART CARDIAC CATH LAB     CV RIGHT HEART CATH N/A 9/21/2020    Procedure: CV RIGHT HEART CATH;  Surgeon: Dalton Baeza MD;  Location:  HEART CARDIAC CATH LAB     CV SWAN LUCIANA PROCEDURE N/A 2/13/2020    Procedure: Metcalf Luciana Procedure;  Surgeon: Chente Moss MD;  Location:  HEART CARDIAC CATH LAB     EP ICD Bilateral 3/16/2020    Procedure: EP ICD;   Surgeon: Dali Day MD;  Location:  HEART CARDIAC CATH LAB     INSERT VENTRICULAR ASSIST DEVICE LEFT (HEARTMATE II) N/A 2020    Procedure: INSERTION, LEFT VENTRICULAR ASSIST DEVICE (HEARTMATE III);  Surgeon: Mac Jaramillo MD;  Location:  OR     THORACOSCOPY Right 3/6/2020    Procedure: RIGHT VIDEO-ASSISTED THORASCOPIC SURGERY, EVACUATION OF HEMOTHORAX, PLACEMENT OF CHEST TUBES;  Surgeon: William Gan MD;  Location:  OR       ALLERGIES:     Allergies   Allergen Reactions     Heparin      HIT screen positive 20, reflex DAVINA negative; however heme recommended treating as if positive  HIT screen negative 20     Oxycodone Itching and Other (See Comments)     Chlorhexidine Rash       FAMILY HISTORY:  No family history on file.Reviewed and non-contributory.     SOCIAL HISTORY:  Social History     Tobacco Use     Smoking status: Former Smoker     Quit date: 1994     Years since quittin.5     Smokeless tobacco: Never Used   Substance Use Topics     Alcohol use: Not on file     Drug use: Not on file       ROS:  10 point ROS neg other than the symptoms noted above in the HPI.    Exam:  The rest of a comprehensive physical examination is deferred due to public health emergency video visit restrictions.  CONSITUTIONAL: no acute distress  HEENT: no icterus, no redness or discharge, neck supple  CV: no visible edema of visualized extremities. No JVD.   RESPIRATORY: respirations nonlabored, no cough  NEURO: AA&Ox3, speech fluent/appropriate, motor grossly nonfocal  PSYCH: cooperative, affect appropriate  DERM: no rashes on visualized face/neck/upper extremities    Labs:  Reviewed.     Testing/Procedures:  20 ECHOCARDIOGRAM:   Interpretation Summary  LVAD HM3 5500     Left ventricular size is normal. Severely reduced left ventricular systolic  function. Septum is midline  LVAD inflow or outflow cannulae not visualized. Doppler velocities are not  elevated.  Moderately reduced right  ventricular systolic function.  Aortic valve appears to open during the entire cardiac cycle. Trace aortic  insufficiency is present.  IVC is normal.  No pericardial effusion present.     Please refer to the EPIC report for measurements performed at different LVAD  speed settings. There was no significant change in the LV size/function or  aortic valve opening at different LVAD speed settings.    8/24/20 [facility]:  Conclusion      Right sided filling pressures are mildly elevated.    Mild elevated Pulmonary Hypertension.    Left sided filling pressures are normal.    Left ventricular filling pressures are normal .    Normal cardiac output level.    Hemodynamic data has been modified in [facility] per physician review.          Plan      Follow bedrest per protocol    Continued medical management and lifestyle modifications for cardiovascular risk factor optimizations.    Follow up with Dr. Cisneros.    Discharge today per protocol   Hemodynamics    RA 10/16/10 mmHg  RV 43/5 mmHg  PA 43/18/27 mmHg  PCW 18/17/14  CO (TD) 4.5 L/min  CO (Jane) 4.5 L/min  CI (TD) 2.19 L/min/m2  CI (Jane) 2.17 L/min/m2   SVR 1386 dynes   PVR 2.6 HUSAIN Right sided filling pressures are mildly elevated.Left sided filling pressures are normal. Mild elevated Pulmonary Hypertension.Left ventricular filling pressures are normal .Normal cardiac output level.Hemodynamic data has been modified in [facility] per physician review.         Assessment and Plan:   Mr. Tanner is a 65 year old male who has a past medical history significant for NICM LVEF 15-20% s/p LVAD HM3 2/18/20, moderate nonobstructive CAD, severe MR, HTN, ABHINAV (uses CPAP), DM2, CKD III, HLD, ANA, and prior tobacco use.He is following up today after ICD implant. Device site well healed. He reports feeling well. He denies any chest pain/pressures, dizziness, lightheadedness, worsening shortness of breath, leg/ankle swelling, PND, orthopnea, palpitations, or syncopal symptoms. No LVAD alarms, no weight  increases or edema. Recent device transmission shows normal ICD function. 15 AF episodes recorded lasting 6 - 44 minutes. AF burden 0.2%. EGMs show irregular ventricular rhythm with morphology similar to sinus. No ventricular arrhythmias recorded. Presenting EGM = regular VS @ 105 bpm.  = 0%. OptiVol fluid index is at baseline. Estimated battery longevity to PERLA = 11 years. Battery voltage = 3.08V. No short v-v intervals recorded. Lead impedance trends appear stable. LT 9/24/20 WDL and TFT 2/28/20 WDL Current cardiac medications include: ASA, Warfarin, Lipitor, Lisinopril, Amiodarone, Digoxin, Lasix, and Hydralazine.     NICM LVEF 15-20%, NYHA III s/p LVAD 2/2020  Atrial Fibrillation, VT/VF:  1. ACEi/ARB: Continue Lisinopril.  2. BB: Not on d/t RV dysfunction   3. Aldosterone antagonist: No on due to variable renal function.  4. SCD prophylaxis: s/p ICD stable lead parameters and normal device function.   5. Fluid status: Continue Lasix, managed by HF team.   6. Had VT/VF and AF s/p DCCV. Has some residual AF on device self limiting. He is on Warfarin also required for LVAD. INRs monitored by Warfarin Clinic. No bleeding issues. He is also on amiodarone. He needs updated PFTs and TFT. While on amiodarone he should have LFT/TFT every 6 months and PFT annually. We will arrange updated monitoring for him. Can consider weaning off amiodarone over the year if arrhythmias remain stable.     Follow up in 6 months.       Video-Visit Details    Type of service:  Video Visit    Video Visit Duration: 13 minutes.     Originating Location (pt. Location): Home    Distant Location (provider location):  [facility] HEART [facility]     Mode of Communication:  Video Conference via [employer].    I have reviewed the note as documented above.  This accurately captures the substance of my virtual visit with the patient. The patient states understanding and is agreeable with the plan.     Candis Holcomb, JUAN  CNP  Electrophysiology Consult Service  Pager: [phone]      CC  KAUSHIK JARRETT

## 2020-10-07 NOTE — LETTER
"10/7/2020      RE: Eliseo Tanner  [address]  [facility] [address]       Dear Colleague,    Thank you for the opportunity to participate in the care of your patient, Eliseo Tanner, at the [facility] HEART [facility]. Please see a copy of my visit note below.    Electrophysiology Clinic Video Virtual Visit    Eliseo Tanner is a 65 year old male who is being evaluated via a billable video visit.      The patient has been notified of following:     \"This video visit will be conducted via a call between you and your physician/provider. We have found that certain health care needs can be provided without the need for an in-person physical exam.  This service lets us provide the care you need with a video conversation.  If a prescription is necessary we can send it directly to your pharmacy.  If lab work is needed we can place an order for that and you can then stop by our lab to have the test done at a later time.    If during the course of the call the physician/provider feels a video visit is not appropriate, you will not be charged for this service.\"     Physician/provider has received verbal consent for a Video Visit from the patient? Yes  Pt will like to use [employer] phone #[phone]    HPI:   Mr. Tanner is a 65 year old male who has a past medical history significant for NICM LVEF 15-20% s/p LVAD HM3 2/18/20, moderate nonobstructive CAD, severe MR, HTN, ABHINAV (uses CPAP), DM2, CKD III, HLD, ANA, and prior tobacco use.   He started having worsening SOB/MIRANDA in 9/2019 which progressively worsened. In 10/2019 he was admitted to OSH and was found to have newly reduced LVEF down to 25-30% with severe MR. He was diuresed about 8 lbs until his SCr bumped. He was started on HF medications. He underwent a CHAMP which showed severe MR and then a coronary angiogram which showed mild/moderate CAD with severe branch disease and elevated LVEDP. He was " admitted and underwent diuresis again. He was then admitted in 1/2020 with HF exacerbation found to be in cardiogenic shock. He was started on Milrinone and underwent diuresis. He was ultimately, very slowly weaned off the milrinone with stable symptoms and labs. SCr at discharge was 1.4 and then on 1/29/20 it had rapid worsening of creat to 2.6. Therefore the decision was made to admit to restart milrinone infusion. Milrinone was restarted and titrated to 0.5 mcg/kg/min. His renal function improved. His Entresto dose was decreased due to lower BP's. PICC line was placed and he was discharged with home milrinone. He then presented for follow up with Cardiology and was feeling poorly, weight gain, feeling SOB, worsening edema, and felt heart racing. He also reported poor appetite as well. He was then sent here for admission. Prior to arrival to floor he was noted to be in Vfib and required shock x 3. He was loaded with amiodarone, ASA 324mg. He ultimately required LVAD placement. His postoperative course was complicated by retrosternal hematoma and bleeding in the lungs, RV failure, ANA, VT/VF, and Afib w/AVR S/p DCCV on 2/28.  He then had ICD implanted on 3/16/20 as secondary prevention of SCD. He has been having increasing SCr over the last few months.  It has seemed that cr has been worsening despite increasing and decreasing his diuretics. Most recently, last week, he had a rapid 10 lb weight gain, we increased his diuretic and he has now returned nealrly back to his prior weight.    He is following up today. Device site well healed. He reports feeling well. He denies any chest pain/pressures, dizziness, lightheadedness, worsening shortness of breath, leg/ankle swelling, PND, orthopnea, palpitations, or syncopal symptoms. No LVAD alarms, no weight increases or edema. Recent device transmission shows normal ICD function. 15 AF episodes recorded lasting 6 - 44 minutes. AF burden 0.2%. EGMs show irregular  ventricular rhythm with morphology similar to sinus. No ventricular arrhythmias recorded. Presenting EGM = regular VS @ 105 bpm.  = 0%. OptiVol fluid index is at baseline. Estimated battery longevity to PERLA = 11 years. Battery voltage = 3.08V. No short v-v intervals recorded. Lead impedance trends appear stable. LT 9/24/20 WDL and TFT 2/28/20 WDL Current cardiac medications include: ASA, Warfarin, Lipitor, Lisinopril, Amiodarone, Digoxin, Lasix, and Hydralazine.     PAST MEDICAL HISTORY:  As per HPI    CURRENT MEDICATIONS:  Current Outpatient Medications   Medication Sig Dispense Refill     acetaminophen (TYLENOL) 325 MG tablet Take 2 tablets (650 mg) by mouth every 4 hours as needed for mild pain or fever 100 tablet 0     albuterol (PROAIR HFA/PROVENTIL HFA/VENTOLIN HFA) 108 (90 Base) MCG/ACT inhaler Inhale 2 puffs into the lungs every 6 hours as needed for wheezing (Patient not taking: Reported on 6/16/2020) 1 Inhaler 1     allopurinol (ZYLOPRIM) 100 MG tablet 2 tablets (200 mg) by Oral or Feeding Tube route daily 60 tablet 1     amiodarone (PACERONE) 200 MG tablet Take 1 tablet (200 mg) by mouth daily 90 tablet 3     aspirin (ASA) 81 MG EC tablet Take 1 tablet (81 mg) by mouth daily 90 tablet 3     co-enzyme Q-10 200 MG CAPS 200 mg by Oral or Feeding Tube route daily       diclofenac (VOLTAREN) 1 % topical gel Apply 4 g topically 4 times daily as needed for moderate pain (Patient not taking: Reported on 6/16/2020) 1 Tube 1     FLUoxetine (PROZAC) 20 MG capsule Take 1 capsule (20 mg) by mouth daily 30 capsule 1     furosemide (LASIX) 20 MG tablet Take 2 tablets (40 mg) by mouth daily 90 tablet 3     hydrALAZINE (APRESOLINE) 25 MG tablet Take 4 tablets (100 mg) by mouth 3 times daily 810 tablet 3     insulin glargine (LANTUS PEN) 100 UNIT/ML pen Inject 10 Units Subcutaneous At Bedtime 3 mL 1     insulin pen needle (BD JAIME U/F) 32G X 4 MM miscellaneous        lisinopril (ZESTRIL) 10 MG tablet Take 1 tablet (10  mg) by mouth daily 90 tablet 3     magnesium oxide (MAG-OX) 400 MG tablet 1 tablet (400 mg) by Oral or Feeding Tube route daily 30 tablet 1     meclizine (ANTIVERT) 25 MG tablet Take 0.5 tablets (12.5 mg) by mouth 3 times daily as needed for dizziness 3 tablet 0     melatonin 5 MG tablet Take 2 tablets (10 mg) by mouth nightly as needed for sleep       methocarbamol (ROBAXIN) 500 MG tablet Take 1 tablet (500 mg) by mouth 4 times daily as needed for muscle spasms 30 tablet 0     MULTIPLE MINERALS-VITAMINS PO Take 1 tablet by mouth daily       multivitamin w/minerals (THERA-VIT-M) tablet Take 1 tablet by mouth daily 30 tablet 1     ondansetron (ZOFRAN) 4 MG tablet Take 4 mg by mouth every 4 hours as needed for nausea       pantoprazole (PROTONIX) 40 MG EC tablet Take 1 tablet (40 mg) by mouth every morning (before breakfast) 30 tablet 0     potassium chloride ER (KLOR-CON M) 10 MEQ CR tablet Take 1 tablet (10 mEq) by mouth daily 30 tablet      warfarin ANTICOAGULANT (COUMADIN) 4 MG tablet Take 4-8mg daily or as directed by the coumadin clinic 150 tablet 3     zolpidem (AMBIEN) 5 MG tablet Take 5 mg by mouth nightly as needed for Insomnia         PAST SURGICAL HISTORY:  Past Surgical History:   Procedure Laterality Date     ANESTHESIA CARDIOVERSION N/A 2/28/2020    Procedure: ANESTHESIA, FOR CARDIOVERSION;  Surgeon: GENERIC ANESTHESIA PROVIDER;  Location: [facility] OR     CV CENTRAL VENOUS CATHETER PLACEMENT N/A 2/13/2020    Procedure: Central Venous Catheter Placement;  Surgeon: Chente Moss MD;  Location:  HEART CARDIAC CATH LAB     CV INTRA-AORTIC BALLOON PUMP INSERTION N/A 2/7/2020    Procedure: Intra-Aortic Balloon Pump Insertion;  Surgeon: Jose Baldwin MD;  Location:  HEART CARDIAC CATH LAB     CV INTRA-AORTIC BALLOON PUMP INSERTION N/A 2/13/2020    Procedure: Intra-Aortic Balloon Pump Insertion;  Surgeon: Chente Moss MD;  Location:  HEART CARDIAC CATH LAB     CV RIGHT  HEART CATH N/A 2020    Procedure: CV RIGHT HEART CATH;  Surgeon: Dalton Baeza MD;  Location:  HEART CARDIAC CATH LAB     CV SWAN LUCIANA PROCEDURE N/A 2020    Procedure: [place] Procedure;  Surgeon: Chente Moss MD;  Location:  HEART CARDIAC CATH LAB     EP ICD Bilateral 3/16/2020    Procedure: EP ICD;  Surgeon: Dali Day MD;  Location:  HEART CARDIAC CATH LAB     INSERT VENTRICULAR ASSIST DEVICE LEFT (HEARTMATE II) N/A 2020    Procedure: INSERTION, LEFT VENTRICULAR ASSIST DEVICE (HEARTMATE III);  Surgeon: Mac Jaramillo MD;  Location:  OR     THORACOSCOPY Right 3/6/2020    Procedure: RIGHT VIDEO-ASSISTED THORASCOPIC SURGERY, EVACUATION OF HEMOTHORAX, PLACEMENT OF CHEST TUBES;  Surgeon: William Gan MD;  Location:  OR       ALLERGIES:     Allergies   Allergen Reactions     Heparin      HIT screen positive 20, reflex DAVINA negative; however heme recommended treating as if positive  HIT screen negative 20     Oxycodone Itching and Other (See Comments)     Chlorhexidine Rash       FAMILY HISTORY:  No family history on file.Reviewed and non-contributory.     SOCIAL HISTORY:  Social History     Tobacco Use     Smoking status: Former Smoker     Quit date: 1994     Years since quittin.5     Smokeless tobacco: Never Used   Substance Use Topics     Alcohol use: Not on file     Drug use: Not on file       ROS:  10 point ROS neg other than the symptoms noted above in the HPI.    Exam:  The rest of a comprehensive physical examination is deferred due to public health emergency video visit restrictions.  CONSITUTIONAL: no acute distress  HEENT: no icterus, no redness or discharge, neck supple  CV: no visible edema of visualized extremities. No JVD.   RESPIRATORY: respirations nonlabored, no cough  NEURO: AA&Ox3, speech fluent/appropriate, motor grossly nonfocal  PSYCH: cooperative, affect appropriate  DERM: no rashes on visualized face/neck/upper  extremities    Labs:  Reviewed.     Testing/Procedures:  4/14/20 ECHOCARDIOGRAM:   Interpretation Summary  LVAD HM3 5500     Left ventricular size is normal. Severely reduced left ventricular systolic  function. Septum is midline  LVAD inflow or outflow cannulae not visualized. Doppler velocities are not  elevated.  Moderately reduced right ventricular systolic function.  Aortic valve appears to open during the entire cardiac cycle. Trace aortic  insufficiency is present.  IVC is normal.  No pericardial effusion present.     Please refer to the EPIC report for measurements performed at different LVAD  speed settings. There was no significant change in the LV size/function or  aortic valve opening at different LVAD speed settings.    8/24/20 RHC:  Conclusion      Right sided filling pressures are mildly elevated.    Mild elevated Pulmonary Hypertension.    Left sided filling pressures are normal.    Left ventricular filling pressures are normal .    Normal cardiac output level.    Hemodynamic data has been modified in Epic per physician review.          Plan      Follow bedrest per protocol    Continued medical management and lifestyle modifications for cardiovascular risk factor optimizations.    Follow up with Dr. Cisneros.    Discharge today per protocol   Hemodynamics    RA 10/16/10 mmHg  RV 43/5 mmHg  PA 43/18/27 mmHg  PCW 18/17/14  CO (TD) 4.5 L/min  CO (Jane) 4.5 L/min  CI (TD) 2.19 L/min/m2  CI (Jane) 2.17 L/min/m2   SVR 1386 dynes   PVR 2.6 HUSAIN Right sided filling pressures are mildly elevated.Left sided filling pressures are normal. Mild elevated Pulmonary Hypertension.Left ventricular filling pressures are normal .Normal cardiac output level.Hemodynamic data has been modified in [facility] per physician review.         Assessment and Plan:   Mr. Tanner is a 65 year old male who has a past medical history significant for NICM LVEF 15-20% s/p LVAD HM3 2/18/20, moderate nonobstructive CAD, severe MR, HTN, ABHINAV (uses  CPAP), DM2, CKD III, HLD, AAN, and prior tobacco use.He is following up today after ICD implant. Device site well healed. He reports feeling well. He denies any chest pain/pressures, dizziness, lightheadedness, worsening shortness of breath, leg/ankle swelling, PND, orthopnea, palpitations, or syncopal symptoms. No LVAD alarms, no weight increases or edema. Recent device transmission shows normal ICD function. 15 AF episodes recorded lasting 6 - 44 minutes. AF burden 0.2%. EGMs show irregular ventricular rhythm with morphology similar to sinus. No ventricular arrhythmias recorded. Presenting EGM = regular VS @ 105 bpm.  = 0%. OptiVol fluid index is at baseline. Estimated battery longevity to PERLA = 11 years. Battery voltage = 3.08V. No short v-v intervals recorded. Lead impedance trends appear stable. LT 9/24/20 WDL and TFT 2/28/20 WDL Current cardiac medications include: ASA, Warfarin, Lipitor, Lisinopril, Amiodarone, Digoxin, Lasix, and Hydralazine.     NICM LVEF 15-20%, NYHA III s/p LVAD 2/2020  Atrial Fibrillation, VT/VF:  1. ACEi/ARB: Continue Lisinopril.  2. BB: Not on d/t RV dysfunction   3. Aldosterone antagonist: No on due to variable renal function.  4. SCD prophylaxis: s/p ICD stable lead parameters and normal device function.   5. Fluid status: Continue Lasix, managed by HF team.   6. Had VT/VF and AF s/p DCCV. Has some residual AF on device self limiting. He is on Warfarin also required for LVAD. INRs monitored by Warfarin Clinic. No bleeding issues. He is also on amiodarone. He needs updated PFTs and TFT. While on amiodarone he should have LFT/TFT every 6 months and PFT annually. We will arrange updated monitoring for him. Can consider weaning off amiodarone over the year if arrhythmias remain stable.     Follow up in 6 months.       Video-Visit Details    Type of service:  Video Visit    Video Visit Duration: 13 minutes.     Originating Location (pt. Location): Home    Distant Location (provider  location):  [facility] HEART [facility]     Mode of Communication:  Video Conference via [employer].    I have reviewed the note as documented above.  This accurately captures the substance of my virtual visit with the patient. The patient states understanding and is agreeable with the plan.     JUAN Reich CNP  Electrophysiology Consult Service  Pager: [phone]      CC  KAUSHIK JARRETT      Please do not hesitate to contact me if you have any questions/concerns.     Sincerely,     JUAN Yang CNP

## 2020-10-07 NOTE — PATIENT INSTRUCTIONS
Plan:    We will arrange breathing test (PFT) and labs for your amiodarone monitoring    Follow up in 6 months or sooner if need arises  Cardiovascular Clinic:   [address] [place] [address]. [place] [address]  Your Care Team:  EP Cardiology   Telephone Number     Candis Holcomb NP [phone]   Tiffany Ontiveros RN  Yaz Laws RN [phone]     For scheduling appts or procedures:    Maura Sanches   [phone]   For the Device Clinic (Pacemakers and ICD's)   RN's :   Yessenia Grover During business hours: [phone]     After business hours:   [phone]- select option 4 and ask for job code [id number].       As always, Thank you for trusting us with your health care needs!

## 2020-10-16 LAB
ALBUMIN SERPL-MCNC: 4.3 G/DL
ALP SERPL-CCNC: 139 U/L
ALT SERPL-CCNC: 88 U/L
AST SERPL-CCNC: 70 U/L
BILIRUB SERPL-MCNC: 0.6 MG/DL
BILIRUBIN DIRECT: 0 MG/DL
LDH SERPL-CCNC: 322 U/L
PROT SERPL-MCNC: 8.4 G/DL

## 2020-10-21 ENCOUNTER — CARE COORDINATION (OUTPATIENT)
Dept: CARDIOLOGY | Facility: CLINIC | Age: 65
End: 2020-10-21

## 2020-10-21 NOTE — PROGRESS NOTES
D:  Repeat labs rcvd.  I:  Results reviewed with Karolina MATHIAS.  Plan: no further follow up at this time. RTC as planned in 6 weeks.  Pt advised.  A:  Lab follow up  P:  Pt verbalized understanding of the instructions given.  Will call VAD coordinator with further needs and questions.

## 2020-10-30 ENCOUNTER — VIRTUAL VISIT (OUTPATIENT)
Dept: NEPHROLOGY | Facility: CLINIC | Age: 65
End: 2020-10-30
Attending: STUDENT IN AN ORGANIZED HEALTH CARE EDUCATION/TRAINING PROGRAM
Payer: MEDICARE

## 2020-10-30 ENCOUNTER — PRE VISIT (OUTPATIENT)
Dept: NEPHROLOGY | Facility: CLINIC | Age: 65
End: 2020-10-30

## 2020-10-30 ENCOUNTER — TELEPHONE (OUTPATIENT)
Dept: NEPHROLOGY | Facility: CLINIC | Age: 65
End: 2020-10-30

## 2020-10-30 DIAGNOSIS — N18.4 CKD (CHRONIC KIDNEY DISEASE) STAGE 4, GFR 15-29 ML/MIN (H): ICD-10-CM

## 2020-10-30 DIAGNOSIS — Z95.811 LVAD (LEFT VENTRICULAR ASSIST DEVICE) PRESENT (H): ICD-10-CM

## 2020-10-30 DIAGNOSIS — I42.8 NONISCHEMIC CARDIOMYOPATHY (H): Primary | ICD-10-CM

## 2020-10-30 PROCEDURE — 99214 OFFICE O/P EST MOD 30 MIN: CPT | Mod: 95 | Performed by: STUDENT IN AN ORGANIZED HEALTH CARE EDUCATION/TRAINING PROGRAM

## 2020-10-30 ASSESSMENT — PAIN SCALES - GENERAL: PAINLEVEL: NO PAIN (0)

## 2020-10-30 NOTE — PROGRESS NOTES
Nephrology Clinic    Video Visit     CC: elevated creatinine     HPI:  Eliseo Tanner is a 65 year old male with pmh of NICM s/p LVAD 2020, DM2, HTN, and MGUS who is referred by STEW Padron for elevated creatinine.     The patient had CKD pre-dating his LVAD with ~1g/g of proteinuria per outside nephrology notes, though his creatinine was not as elevated as it has been in the last couple months. The patient states he was told his CKD was due to HTN. His creatinine was at baseline 1.5 mg/dl in the years leading up to 2019 before rising to the 1.5 to low 2s prior to his presentation in early 2020 with cardiac arrest, for which he was given an LVAD. He also had BERNARDA requiring dialysis at that time.     After his LVAD placement, his creatinine decreased to 1.0 and slowly has risen to 1.7-1.9 over the course of 2020. His creatinine then increased to its current 2.3 at the end of September (from 1.7 in early September). He had RHC the same day as the creatinine of 2.3 showing R atrial pressure of 10 mmHg, with which the cardiology team was satisfied.     UA has historically been occasionally with blood, but mostly negative. His OSH nephrology notes refer to UPCR around 1g/g, though I don't see those labs.     He was diagnosed with DM2 in 2019 when his HF was found he states. He is unaware of any prior diabetes screens, and while his BS was often in the low 100s in recent years (thought not exclusively), the patient states he did not routinely fast for lab tests. He states he has had an eye appointment and was not told DM was affecting his eyes. He denies numbness/tinglying in the extremities as well.     On warfarin since LVAD placement in early 2020.     HTN for ~25 years.   Gout: last gout attack many years after 3-4 gout attacks     PMH:   DM2   HTN  Systolic HF, NICM, s/p 2020 LVAD  MGUS  Gout    Past Surgical History:   Procedure Laterality Date     ANESTHESIA CARDIOVERSION N/A 2/28/2020    Procedure:  ANESTHESIA, FOR CARDIOVERSION;  Surgeon: GENERIC ANESTHESIA PROVIDER;  Location: [facility]     CV CENTRAL VENOUS CATHETER PLACEMENT N/A 2020    Procedure: Central Venous Catheter Placement;  Surgeon: Chente Moss MD;  Location:  HEART CARDIAC CATH LAB     CV INTRA-AORTIC BALLOON PUMP INSERTION N/A 2020    Procedure: Intra-Aortic Balloon Pump Insertion;  Surgeon: Jose Baldwin MD;  Location:  HEART CARDIAC CATH LAB     CV INTRA-AORTIC BALLOON PUMP INSERTION N/A 2020    Procedure: Intra-Aortic Balloon Pump Insertion;  Surgeon: Chente Moss MD;  Location:  HEART CARDIAC CATH LAB     CV RIGHT HEART CATH N/A 2020    Procedure: CV RIGHT HEART CATH;  Surgeon: Dalton Baeza MD;  Location:  HEART CARDIAC CATH LAB     CV SWAN LUCIANA PROCEDURE N/A 2020    Procedure: [place] Luciana Procedure;  Surgeon: Chente Moss MD;  Location:  HEART CARDIAC CATH LAB     EP ICD Bilateral 3/16/2020    Procedure: EP ICD;  Surgeon: Dali Day MD;  Location:  HEART CARDIAC CATH LAB     INSERT VENTRICULAR ASSIST DEVICE LEFT (HEARTMATE II) N/A 2020    Procedure: INSERTION, LEFT VENTRICULAR ASSIST DEVICE (HEARTMATE III);  Surgeon: Mac Jaramillo MD;  Location:  OR     THORACOSCOPY Right 3/6/2020    Procedure: RIGHT VIDEO-ASSISTED THORASCOPIC SURGERY, EVACUATION OF HEMOTHORAX, PLACEMENT OF CHEST TUBES;  Surgeon: William Gan MD;  Location:  OR        FHx: no family hx of kidney disease       Social History     Tobacco Use     Smoking status: Former Smoker     Quit date: 1994     Years since quittin.5     Smokeless tobacco: Never Used   Substance Use Topics     Alcohol use: None     Drug use: None       Medications:  Current Outpatient Medications   Medication Sig Dispense Refill     acetaminophen (TYLENOL) 325 MG tablet Take 2 tablets (650 mg) by mouth every 4 hours as needed for mild pain or fever 100 tablet 0     allopurinol  (ZYLOPRIM) 100 MG tablet 2 tablets (200 mg) by Oral or Feeding Tube route daily 60 tablet 1     amiodarone (PACERONE) 200 MG tablet Take 1 tablet (200 mg) by mouth daily 90 tablet 3     aspirin (ASA) 81 MG EC tablet Take 1 tablet (81 mg) by mouth daily 90 tablet 3     co-enzyme Q-10 200 MG CAPS 200 mg by Oral or Feeding Tube route daily       FLUoxetine (PROZAC) 20 MG capsule Take 1 capsule (20 mg) by mouth daily 30 capsule 1     furosemide (LASIX) 20 MG tablet Take 2 tablets (40 mg) by mouth daily 90 tablet 3     hydrALAZINE (APRESOLINE) 25 MG tablet Take 4 tablets (100 mg) by mouth 3 times daily 810 tablet 3     insulin pen needle (BD JAIME U/F) 32G X 4 MM miscellaneous        magnesium oxide (MAG-OX) 400 MG tablet 1 tablet (400 mg) by Oral or Feeding Tube route daily 30 tablet 1     meclizine (ANTIVERT) 25 MG tablet Take 0.5 tablets (12.5 mg) by mouth 3 times daily as needed for dizziness 3 tablet 0     methocarbamol (ROBAXIN) 500 MG tablet Take 1 tablet (500 mg) by mouth 4 times daily as needed for muscle spasms 30 tablet 0     MULTIPLE MINERALS-VITAMINS PO Take 1 tablet by mouth daily       multivitamin w/minerals (THERA-VIT-M) tablet Take 1 tablet by mouth daily 30 tablet 1     potassium chloride ER (KLOR-CON M) 10 MEQ CR tablet Take 1 tablet (10 mEq) by mouth daily 30 tablet      warfarin ANTICOAGULANT (COUMADIN) 4 MG tablet Take 4-8mg daily or as directed by the coumadin clinic 150 tablet 3     zolpidem (AMBIEN) 5 MG tablet Take 5 mg by mouth nightly as needed for Insomnia       albuterol (PROAIR HFA/PROVENTIL HFA/VENTOLIN HFA) 108 (90 Base) MCG/ACT inhaler Inhale 2 puffs into the lungs every 6 hours as needed for wheezing (Patient not taking: Reported on 6/16/2020) 1 Inhaler 1     diclofenac (VOLTAREN) 1 % topical gel Apply 4 g topically 4 times daily as needed for moderate pain (Patient not taking: Reported on 6/16/2020) 1 Tube 1     insulin glargine (LANTUS PEN) 100 UNIT/ML pen Inject 10 Units Subcutaneous  At Bedtime (Patient not taking: Reported on 10/30/2020) 3 mL 1     lisinopril (ZESTRIL) 10 MG tablet Take 1 tablet (10 mg) by mouth daily 90 tablet 3     melatonin 5 MG tablet Take 2 tablets (10 mg) by mouth nightly as needed for sleep       ondansetron (ZOFRAN) 4 MG tablet Take 4 mg by mouth every 4 hours as needed for nausea       pantoprazole (PROTONIX) 40 MG EC tablet Take 1 tablet (40 mg) by mouth every morning (before breakfast) (Patient not taking: Reported on 10/30/2020) 30 tablet 0       Review Of Systems:  12 point ROS otherwise negative.     OBJECTIVE:  Vitals and exam deferred due to video visit.     Labs reviewed.     Recent Labs   Lab Test 09/21/20  1438 09/21/20  0851    136   POTASSIUM 4.4 4.4   CHLORIDE 106 106   CO2 25 26   ANIONGAP 8 5   * 138*   BUN 56* 62*   CR 2.26* 2.33*   CALOS 8.9 8.9       Lab Results   Component Value Date    WBC 7.2 09/21/2020     Lab Results   Component Value Date    RBC 5.07 09/21/2020     Lab Results   Component Value Date    HGB 10.9 09/21/2020     Lab Results   Component Value Date    HCT 36.8 09/21/2020     No components found for: MCT  Lab Results   Component Value Date    MCV 73 09/21/2020     Lab Results   Component Value Date    MCH 21.5 09/21/2020     Lab Results   Component Value Date    MCHC 29.6 09/21/2020     Lab Results   Component Value Date    RDW 20.4 09/21/2020     Lab Results   Component Value Date     09/21/2020       Color Urine (no units)   Date Value   02/22/2020 Yellow     Appearance Urine (no units)   Date Value   02/22/2020 Slightly Cloudy     Glucose Urine (mg/dL)   Date Value   02/22/2020 Negative     Bilirubin Urine (no units)   Date Value   02/22/2020 Negative     Ketones Urine (mg/dL)   Date Value   02/22/2020 Negative     Specific Gravity Urine (no units)   Date Value   02/22/2020 1.022     pH Urine (pH)   Date Value   02/22/2020 5.5     Protein Albumin Urine (mg/dL)   Date Value   02/22/2020 100 (A)     Nitrite Urine  (no units)   Date Value   02/22/2020 Negative     Leukocyte Esterase Urine (no units)   Date Value   02/22/2020 Small (A)         ASSESSMENT/PLAN:  Pt is a 65 year old male here to establish care.    Eliseo was seen today for consult.    Diagnoses and all orders for this visit:    Nonischemic cardiomyopathy (H)    CKD (chronic kidney disease) stage 4, GFR 15-29 ml/min (H) with progressive creatinine rise in last 1-2 months  He may potentially have had DM for longer than suspected, and/or his HTN may have contributed to his pre-VAD CKD. His BERNARDA requiring dialysis (from cardiac arrest) also puts him at risk for progressive CKD. Agree with cardiology that his recent RHC make attributing his more recent creatinine rise to right HF less likely). His UAs don't show a nephritic picture historically, but will update UA and obtain UPCR. Will also obtain renal U/S to r/out obstructive causes. HIV/HepB/HepC recently ruled out. Warfarin has rarely been attributed to BERNARDA, though not well fitting as he has been taking this for ~8 months. He states he is up to date on cancer screening and recent SPEP was reassuring. Severe hemolysis (presumable from VAD) also not appearing likely based on recent CBC (some hemolysis based on LDH, but CBC stable). Will repeat his UPCR and BMP to continue to monitor trend and see him back in about 2-3 months (sooner if needed based on results).   -     US Renal Complete; Future  -     Basic metabolic panel **(2 WKS); Future  -     Phosphorus; Future  -     CBC with platelets; Future  -     Routine UA with microscopic (for [facility]); Future  -     Protein  random urine with Creat Ratio; Future    LVAD (left ventricular assist device) present (H), non-ischemic cardiomyopathy   On warfarin and ASA 81mg (making renal biopsy not doable). On lisinopril 10mg daily, which can be continued at this current GFR at this time. Also on Lasix 40mg daily. No change to these made today.     Electrolytes, acid-base  No  issues on last labs.    Anemia  Some hemolysis with VAD. Hgb remains above CKD-minimum goal of 10.        Return to clinic in 3 months.     Daniel Yoon MD  Instructor  Nephrology  Pager [phone]

## 2020-10-30 NOTE — TELEPHONE ENCOUNTER
Called patient to see where he wants to complete his ultrasound. Patient wants to have done at hospital in [place].   Faxed order to facility.     Fax # [phone]    Patient aware the facility will call him to schedule appointment date/time.

## 2020-10-30 NOTE — LETTER
"10/30/2020       RE: Eliseo Tanner  [address] [place] [address]     Dear Colleague,    Thank you for referring your patient, Eliseo Tanner, to the [facility]. Please see a copy of my visit note below.    Eliseo Tanner is a 65 year old male who is being evaluated via a billable video visit.      The patient has been notified of following:     \"This video visit will be conducted via a call between you and your physician/provider. We have found that certain health care needs can be provided without the need for an in-person physical exam.  This service lets us provide the care you need with a video conversation.  If a prescription is necessary we can send it directly to your pharmacy.  If lab work is needed we can place an order for that and you can then stop by our lab to have the test done at a later time.    Video visits are billed at different rates depending on your insurance coverage.  Please reach out to your insurance provider with any questions.    If during the course of the call the physician/provider feels a video visit is not appropriate, you will not be charged for this service.\"    Patient has given verbal consent for Video visit? Yes  How would you like to obtain your AVS? Mail a copy         If you are dropped from the video visit, the video invite should be resent to: Please send [place] link to [phone]      Will anyone else be joining your video visit? No        Video-Visit Details    Type of service:  Video Visit    Video Start Time: 10:13 AM  Video End Time: 10:40 AM    Originating Location (pt. Location): Home    Distant Location (provider location):  [facility]     Platform used for Video Visit: Linquet    Daniel Yoon MD          Nephrology Clinic    Video Visit     CC: elevated creatinine     HPI:  Eliseo Tanner is a 65 " year old male with pmh of NICM s/p LVAD 2020, DM2, HTN, and MGUS who is referred by STEW Padron for elevated creatinine.     The patient had CKD pre-dating his LVAD with ~1g/g of proteinuria per outside nephrology notes, though his creatinine was not as elevated as it has been in the last couple months. The patient states he was told his CKD was due to HTN. His creatinine was at baseline 1.5 mg/dl in the years leading up to 2019 before rising to the 1.5 to low 2s prior to his presentation in early 2020 with cardiac arrest, for which he was given an LVAD. He also had BERNARDA requiring dialysis at that time.     After his LVAD placement, his creatinine decreased to 1.0 and slowly has risen to 1.7-1.9 over the course of 2020. His creatinine then increased to its current 2.3 at the end of September (from 1.7 in early September). He had RHC the same day as the creatinine of 2.3 showing R atrial pressure of 10 mmHg, with which the cardiology team was satisfied.     UA has historically been occasionally with blood, but mostly negative. His OSH nephrology notes refer to UPCR around 1g/g, though I don't see those labs.     He was diagnosed with DM2 in 2019 when his HF was found he states. He is unaware of any prior diabetes screens, and while his BS was often in the low 100s in recent years (thought not exclusively), the patient states he did not routinely fast for lab tests. He states he has had an eye appointment and was not told DM was affecting his eyes. He denies numbness/tinglying in the extremities as well.     On warfarin since LVAD placement in early 2020.     HTN for ~25 years.   Gout: last gout attack many years after 3-4 gout attacks     PMH:   DM2   HTN  Systolic HF, NICM, s/p 2020 LVAD  MGUS  Gout    Past Surgical History:   Procedure Laterality Date     ANESTHESIA CARDIOVERSION N/A 2/28/2020    Procedure: ANESTHESIA, FOR CARDIOVERSION;  Surgeon: GENERIC ANESTHESIA PROVIDER;  Location: [facility]       CENTRAL VENOUS CATHETER PLACEMENT N/A 2020    Procedure: Central Venous Catheter Placement;  Surgeon: Chente Moss MD;  Location:  HEART CARDIAC CATH LAB     CV INTRA-AORTIC BALLOON PUMP INSERTION N/A 2020    Procedure: Intra-Aortic Balloon Pump Insertion;  Surgeon: Jose Baldwin MD;  Location:  HEART CARDIAC CATH LAB     CV INTRA-AORTIC BALLOON PUMP INSERTION N/A 2020    Procedure: Intra-Aortic Balloon Pump Insertion;  Surgeon: Chente Moss MD;  Location:  HEART CARDIAC CATH LAB     CV RIGHT HEART CATH N/A 2020    Procedure: CV RIGHT HEART CATH;  Surgeon: Dalton Baeza MD;  Location:  HEART CARDIAC CATH LAB     CV SWAN LUCIANA PROCEDURE N/A 2020    Procedure: [place] Luciana Procedure;  Surgeon: Chente Moss MD;  Location:  HEART CARDIAC CATH LAB     EP ICD Bilateral 3/16/2020    Procedure: EP ICD;  Surgeon: Dali Day MD;  Location:  HEART CARDIAC CATH LAB     INSERT VENTRICULAR ASSIST DEVICE LEFT (HEARTMATE II) N/A 2020    Procedure: INSERTION, LEFT VENTRICULAR ASSIST DEVICE (HEARTMATE III);  Surgeon: Mac Jaramillo MD;  Location:  OR     THORACOSCOPY Right 3/6/2020    Procedure: RIGHT VIDEO-ASSISTED THORASCOPIC SURGERY, EVACUATION OF HEMOTHORAX, PLACEMENT OF CHEST TUBES;  Surgeon: William Gan MD;  Location:  OR        FHx: no family hx of kidney disease       Social History     Tobacco Use     Smoking status: Former Smoker     Quit date: 1994     Years since quittin.5     Smokeless tobacco: Never Used   Substance Use Topics     Alcohol use: None     Drug use: None       Medications:  Current Outpatient Medications   Medication Sig Dispense Refill     acetaminophen (TYLENOL) 325 MG tablet Take 2 tablets (650 mg) by mouth every 4 hours as needed for mild pain or fever 100 tablet 0     allopurinol (ZYLOPRIM) 100 MG tablet 2 tablets (200 mg) by Oral or Feeding Tube route daily 60 tablet 1      amiodarone (PACERONE) 200 MG tablet Take 1 tablet (200 mg) by mouth daily 90 tablet 3     aspirin (ASA) 81 MG EC tablet Take 1 tablet (81 mg) by mouth daily 90 tablet 3     co-enzyme Q-10 200 MG CAPS 200 mg by Oral or Feeding Tube route daily       FLUoxetine (PROZAC) 20 MG capsule Take 1 capsule (20 mg) by mouth daily 30 capsule 1     furosemide (LASIX) 20 MG tablet Take 2 tablets (40 mg) by mouth daily 90 tablet 3     hydrALAZINE (APRESOLINE) 25 MG tablet Take 4 tablets (100 mg) by mouth 3 times daily 810 tablet 3     insulin pen needle (BD JAIME U/F) 32G X 4 MM miscellaneous        magnesium oxide (MAG-OX) 400 MG tablet 1 tablet (400 mg) by Oral or Feeding Tube route daily 30 tablet 1     meclizine (ANTIVERT) 25 MG tablet Take 0.5 tablets (12.5 mg) by mouth 3 times daily as needed for dizziness 3 tablet 0     methocarbamol (ROBAXIN) 500 MG tablet Take 1 tablet (500 mg) by mouth 4 times daily as needed for muscle spasms 30 tablet 0     MULTIPLE MINERALS-VITAMINS PO Take 1 tablet by mouth daily       multivitamin w/minerals (THERA-VIT-M) tablet Take 1 tablet by mouth daily 30 tablet 1     potassium chloride ER (KLOR-CON M) 10 MEQ CR tablet Take 1 tablet (10 mEq) by mouth daily 30 tablet      warfarin ANTICOAGULANT (COUMADIN) 4 MG tablet Take 4-8mg daily or as directed by the coumadin clinic 150 tablet 3     zolpidem (AMBIEN) 5 MG tablet Take 5 mg by mouth nightly as needed for Insomnia       albuterol (PROAIR HFA/PROVENTIL HFA/VENTOLIN HFA) 108 (90 Base) MCG/ACT inhaler Inhale 2 puffs into the lungs every 6 hours as needed for wheezing (Patient not taking: Reported on 6/16/2020) 1 Inhaler 1     diclofenac (VOLTAREN) 1 % topical gel Apply 4 g topically 4 times daily as needed for moderate pain (Patient not taking: Reported on 6/16/2020) 1 Tube 1     insulin glargine (LANTUS PEN) 100 UNIT/ML pen Inject 10 Units Subcutaneous At Bedtime (Patient not taking: Reported on 10/30/2020) 3 mL 1     lisinopril (ZESTRIL) 10 MG  tablet Take 1 tablet (10 mg) by mouth daily 90 tablet 3     melatonin 5 MG tablet Take 2 tablets (10 mg) by mouth nightly as needed for sleep       ondansetron (ZOFRAN) 4 MG tablet Take 4 mg by mouth every 4 hours as needed for nausea       pantoprazole (PROTONIX) 40 MG EC tablet Take 1 tablet (40 mg) by mouth every morning (before breakfast) (Patient not taking: Reported on 10/30/2020) 30 tablet 0       Review Of Systems:  12 point ROS otherwise negative.     OBJECTIVE:  Vitals and exam deferred due to video visit.     Labs reviewed.     Recent Labs   Lab Test 09/21/20  1438 09/21/20  0851    136   POTASSIUM 4.4 4.4   CHLORIDE 106 106   CO2 25 26   ANIONGAP 8 5   * 138*   BUN 56* 62*   CR 2.26* 2.33*   CALOS 8.9 8.9       Lab Results   Component Value Date    WBC 7.2 09/21/2020     Lab Results   Component Value Date    RBC 5.07 09/21/2020     Lab Results   Component Value Date    HGB 10.9 09/21/2020     Lab Results   Component Value Date    HCT 36.8 09/21/2020     No components found for: MCT  Lab Results   Component Value Date    MCV 73 09/21/2020     Lab Results   Component Value Date    MCH 21.5 09/21/2020     Lab Results   Component Value Date    MCHC 29.6 09/21/2020     Lab Results   Component Value Date    RDW 20.4 09/21/2020     Lab Results   Component Value Date     09/21/2020       Color Urine (no units)   Date Value   02/22/2020 Yellow     Appearance Urine (no units)   Date Value   02/22/2020 Slightly Cloudy     Glucose Urine (mg/dL)   Date Value   02/22/2020 Negative     Bilirubin Urine (no units)   Date Value   02/22/2020 Negative     Ketones Urine (mg/dL)   Date Value   02/22/2020 Negative     Specific Gravity Urine (no units)   Date Value   02/22/2020 1.022     pH Urine (pH)   Date Value   02/22/2020 5.5     Protein Albumin Urine (mg/dL)   Date Value   02/22/2020 100 (A)     Nitrite Urine (no units)   Date Value   02/22/2020 Negative     Leukocyte Esterase Urine (no units)   Date  Value   02/22/2020 Small (A)         ASSESSMENT/PLAN:  Pt is a 65 year old male here to establish care.    Eliseo was seen today for consult.    Diagnoses and all orders for this visit:    Nonischemic cardiomyopathy (H)    CKD (chronic kidney disease) stage 4, GFR 15-29 ml/min (H) with progressive creatinine rise in last 1-2 months  He may potentially have had DM for longer than suspected, and/or his HTN may have contributed to his pre-VAD CKD. His BERNARDA requiring dialysis (from cardiac arrest) also puts him at risk for progressive CKD. Agree with cardiology that his recent RHC make attributing his more recent creatinine rise to right HF less likely). His UAs don't show a nephritic picture historically, but will update UA and obtain UPCR. Will also obtain renal U/S to r/out obstructive causes. HIV/HepB/HepC recently ruled out. Warfarin has rarely been attributed to BERNARDA, though not well fitting as he has been taking this for ~8 months. He states he is up to date on cancer screening and recent SPEP was reassuring. Severe hemolysis (presumable from VAD) also not appearing likely based on recent CBC (some hemolysis based on LDH, but CBC stable). Will repeat his UPCR and BMP to continue to monitor trend and see him back in about 2-3 months (sooner if needed based on results).   -     US Renal Complete; Future  -     Basic metabolic panel **(2 WKS); Future  -     Phosphorus; Future  -     CBC with platelets; Future  -     Routine UA with microscopic (for [facility]); Future  -     Protein  random urine with Creat Ratio; Future    LVAD (left ventricular assist device) present (H), non-ischemic cardiomyopathy   On warfarin and ASA 81mg (making renal biopsy not doable). On lisinopril 10mg daily, which can be continued at this current GFR at this time. Also on Lasix 40mg daily. No change to these made today.     Electrolytes, acid-base  No issues on last labs.    Anemia  Some hemolysis with VAD. Hgb remains above CKD-minimum goal of  10.        Return to clinic in 3 months.     Daniel Yoon MD  Instructor  Nephrology  Pager [phone]          Again, thank you for allowing me to participate in the care of your patient.      Sincerely,    Daniel Yoon MD

## 2020-10-30 NOTE — PROGRESS NOTES
"Eliseo Tanner is a 65 year old male who is being evaluated via a billable video visit.      The patient has been notified of following:     \"This video visit will be conducted via a call between you and your physician/provider. We have found that certain health care needs can be provided without the need for an in-person physical exam.  This service lets us provide the care you need with a video conversation.  If a prescription is necessary we can send it directly to your pharmacy.  If lab work is needed we can place an order for that and you can then stop by our lab to have the test done at a later time.    Video visits are billed at different rates depending on your insurance coverage.  Please reach out to your insurance provider with any questions.    If during the course of the call the physician/provider feels a video visit is not appropriate, you will not be charged for this service.\"    Patient has given verbal consent for Video visit? Yes  How would you like to obtain your AVS? Mail a copy         If you are dropped from the video visit, the video invite should be resent to: Please send [place] link to [phone]      Will anyone else be joining your video visit? No        Video-Visit Details    Type of service:  Video Visit    Video Start Time: 10:13 AM  Video End Time: 10:40 AM    Originating Location (pt. Location): Home    Distant Location (provider location):  [facility] [place]     Platform used for Video Visit: Grapeshot    Daniel Yoon MD        "

## 2020-11-03 ENCOUNTER — TELEPHONE (OUTPATIENT)
Dept: ANTICOAGULATION | Facility: CLINIC | Age: 65
End: 2020-11-03

## 2020-11-03 DIAGNOSIS — Z95.811 LVAD (LEFT VENTRICULAR ASSIST DEVICE) PRESENT (H): ICD-10-CM

## 2020-11-03 DIAGNOSIS — I50.22 CHRONIC SYSTOLIC CONGESTIVE HEART FAILURE (H): ICD-10-CM

## 2020-11-03 NOTE — TELEPHONE ENCOUNTER
Pt called reporting that he has not heard from us for a while. Pt reports that he went into the lab on 10/16 and that an INR was drawn. Writer looked in Care Everywhere and the only thing that was drawn on 10/16 was a LDH. Pt is unable to come in today for an INR, but will go in tomorrow. Updated the calendar

## 2020-11-04 ENCOUNTER — ANTICOAGULATION THERAPY VISIT (OUTPATIENT)
Dept: ANTICOAGULATION | Facility: CLINIC | Age: 65
End: 2020-11-04

## 2020-11-04 ENCOUNTER — CARE COORDINATION (OUTPATIENT)
Dept: CARDIOLOGY | Facility: CLINIC | Age: 65
End: 2020-11-04

## 2020-11-04 DIAGNOSIS — Z95.811 LVAD (LEFT VENTRICULAR ASSIST DEVICE) PRESENT (H): ICD-10-CM

## 2020-11-04 DIAGNOSIS — I50.22 CHRONIC SYSTOLIC CONGESTIVE HEART FAILURE (H): ICD-10-CM

## 2020-11-04 LAB — INR PPP: 2.4 (ref 0.9–1.1)

## 2020-11-04 NOTE — PROGRESS NOTES
ANTICOAGULATION FOLLOW-UP CLINIC VISIT    Patient Name:  Eliseo Tanner  Date:  2020  Contact Type:  Telephone    SUBJECTIVE:         OBJECTIVE    Recent labs: (last 7 days)     20   INR 2.4*       ASSESSMENT / PLAN  INR assessment THER    Recheck INR In: 2 WEEKS    INR Location Outside lab      Anticoagulation Summary  As of 2020    INR goal:  2.0-3.0   TTR:  93.6 % (7.4 mo)   INR used for dosin.4 (2020)   Warfarin maintenance plan:  4 mg (4 mg x 1) every day   Full warfarin instructions:  4 mg every day   Weekly warfarin total:  28 mg   No change documented:  Luiza Perkins, RN   Plan last modified:  Krysta Mcdaniel RN (2020)   Next INR check:  2020   Priority:  Critical   Target end date:  Indefinite    Indications    LVAD (left ventricular assist device) present (H) [Z95.811]  Chronic systolic congestive heart failure (H) [I50.22]             Anticoagulation Episode Summary     INR check location:      Preferred lab:      Send INR reminders to:  [facility]    Comments:  Patient on Amiodarone +++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 RICHARDS's NOTE 2/15/20++++  HM 3  placed 2020, 81mg ASA, Speak to spouseVeronique at [phone]  2020:  Lab: [facility]:  ph: [phone] opt 5;   fax: [phone]      Anticoagulation Care Providers     Provider Role Specialty Phone number    Nader Cisneros MD Referring Cardiovascular Disease [phone]            See the Encounter Report to view Anticoagulation Flowsheet and Dosing Calendar (Go to Encounters tab in chart review, and find the Anticoagulation Therapy Visit)    Left voice message for spouse Veronique    Patient had LVAD placed on:   20  Type of LVAD: HM 3  Patient's current Aspirin dose: ASA 81mg Daily  LVAD Protocol followed: Yes   If Not Followed Explanation:  N/A    Luiza Perkins, KRISTEN

## 2020-11-05 ENCOUNTER — CARE COORDINATION (OUTPATIENT)
Dept: CARDIOLOGY | Facility: CLINIC | Age: 65
End: 2020-11-05

## 2020-11-05 ENCOUNTER — DOCUMENTATION ONLY (OUTPATIENT)
Dept: ANTICOAGULATION | Facility: CLINIC | Age: 65
End: 2020-11-05

## 2020-11-05 ENCOUNTER — TELEPHONE (OUTPATIENT)
Dept: INFECTIOUS DISEASES | Facility: CLINIC | Age: 65
End: 2020-11-05

## 2020-11-05 DIAGNOSIS — I50.22 CHRONIC SYSTOLIC CONGESTIVE HEART FAILURE (H): ICD-10-CM

## 2020-11-05 DIAGNOSIS — L08.9 WOUND INFECTION: ICD-10-CM

## 2020-11-05 DIAGNOSIS — T14.8XXA WOUND INFECTION: ICD-10-CM

## 2020-11-05 DIAGNOSIS — Z95.811 LVAD (LEFT VENTRICULAR ASSIST DEVICE) PRESENT (H): Primary | ICD-10-CM

## 2020-11-05 DIAGNOSIS — Z95.811 LVAD (LEFT VENTRICULAR ASSIST DEVICE) PRESENT (H): ICD-10-CM

## 2020-11-05 LAB
BASOPHILS NFR BLD AUTO: 0.5 %
CRP SERPL-MCNC: 54.9 MG/L
EOSINOPHIL NFR BLD AUTO: 2.8 %
ERYTHROCYTE [DISTWIDTH] IN BLOOD BY AUTOMATED COUNT: 18.5 %
HCT VFR BLD AUTO: 33.7 %
HEMOGLOBIN: 10.1 G/DL (ref 13.3–17.7)
LYMPHOCYTES NFR BLD AUTO: 13.5 %
MCH RBC QN AUTO: 20.7 PG
MCHC RBC AUTO-ENTMCNC: 29.9 G/DL
MCV RBC AUTO: 69 FL
MONOCYTES NFR BLD AUTO: 8.6 %
NEUTROPHILS NFR BLD AUTO: 74.6 %
PLATELET COUNT - QUEST: 385 10^9/L (ref 150–450)
RBC # BLD AUTO: 4.85 10^12/L
WBC # BLD AUTO: 9.4 10^9/L

## 2020-11-05 RX ORDER — DOXYCYCLINE 100 MG/1
100 CAPSULE ORAL 2 TIMES DAILY
Qty: 28 CAPSULE | Refills: 0 | Status: SHIPPED | OUTPATIENT
Start: 2020-11-05 | End: 2020-11-12

## 2020-11-05 NOTE — PROGRESS NOTES
"D: Pt and his wife called to report new redness and drainage at his driveline site. Pt states his driveline site has been tender for about 5 days. He does not recall any trauma to his driveline site. He has not been more active lately. Pt's wife states the site had been looking normal. Tonight, she noticed about 1-1.5\" of redness around the exit site. They also noticed some bloody drainage, along with some \"white stuff\". No fever/chills/sweats. Pt is otherwise feeling well.   I: Pt and wife instructed to send pictures to VAD Coordinator. They were instructed to call over night if he develops a fever/chills/sweats, or if the driveline site looks worse.   A: Pt and wife verbalized understanding. Pt with Sz/Sx of driveline infection vs. Trauma to his site.   P: Will plan assess picture of pt's site. If site has Sz/Sx of infection, will send pt to local clinic for wound culture tomorrow and start pt on doxycycline, per protocol.   "

## 2020-11-05 NOTE — PROGRESS NOTES
Received a message from VAD coordinator that Eliseo is starting doxycycline today for a drive line infection.  No change is made to warfarin recommendations, no call made to patient.  Krysta Mcdaniel RN

## 2020-11-05 NOTE — TELEPHONE ENCOUNTER
[facility] Call Center    Phone Message    May a detailed message be left on voicemail: yes     Reason for Call: Appointment Intake    Referring Provider Name: [facility]  Diagnosis and/or Symptoms: LVAD (left ventricular assist device) present (H)  Chronic systolic congestive heart failure (H)  Wound infection    Please review and call patient to schedule an appointment. Thank you.     Action Taken: Message routed to:  [facility] ID    Travel Screening: Not Applicable

## 2020-11-05 NOTE — LETTER
Patient Name: Eliseo Tanner   : 1955   Diagnosis/ICD-10: Heart Failure, unspecified I50.9; LVAD Z95.811   Requesting Physician: Dr. Nader Cisneros   Date of Request: 20   Date to Draw ASAP today 2020     **Please fax results to Gladys VANESSA RN VAD Coord at [phone]. Call [phone] with Questions    ORDER CODE TESTS NANCY.VOL.    CBASIC Na, K, Cl, CO2, Crea, BUN, Glu, Ca GG 0.5-1    BHEPAT Alb, AlkP, ALT, AST, BBil, TP GG 0.5-1    BLIP Chol, Trig, HDL, LDL GG 1-2    CCOMP Na, K, Cl, CO2, Crea, BUN, Glu, Ca, Alb, AlkP, ALT, AST, Bbil, TP,  GG 0.6-1    HGP CBC & Platelet P 0.3-1   X HGDP CBC, Differential & Platelet P 0.3-1    PLT Platelet  P 0.3-1    INR INR B 2.7    PTT PTT B 2.7   X CRP CRP Inflammation GG 0.3-1    PHGB Plasma Hemoglobin GG 2-3    Pre-Albumin Pre-Albumin RG 1.0   X  Blood Cultures X2 site    X  Wound culture (aerobic & anaerobic) of driveline site       Signed,      Nader Cisneros MD  Heart Failure, Mechanical Circulatory Support and Transplant Cardiology   of Medicine,  Division of Cardiology, [facility]

## 2020-11-05 NOTE — PROGRESS NOTES
"D:  Pt sent pictures and called to report new drainage at driveline exit site.  Pt reporting feeling a little\"punky\" the last two days.  Drainage first noted last evening.  No change to LVAD parameters.        I:  Per protocol: labs, cultures, ID consult, & doxycycline ordered.  Pt instructed to only use daily dressings while on abx.  Coumadin clinic advised of abx start.   A:  New driveline infection  P:  Pt verbalized understanding of the instructions given.  Will call VAD coordinator with further needs and questions.    "

## 2020-11-06 ENCOUNTER — TELEPHONE (OUTPATIENT)
Dept: CARDIOLOGY | Facility: CLINIC | Age: 65
End: 2020-11-06

## 2020-11-06 ENCOUNTER — CARE COORDINATION (OUTPATIENT)
Dept: CARDIOLOGY | Facility: CLINIC | Age: 65
End: 2020-11-06

## 2020-11-06 NOTE — PROGRESS NOTES
VAD coordinator on call paged by ED MD at [facility]. Pt presented to the ED today with chest pain. Pain is in RUQ/righ lower chest, worse with inspiration. CXR taken but not resulted yet. Troponin slightly elevated at 0.0749. EKG shows sinus tach. Lactate is 2.2. CMP is unremarkable. Pt was seen in their clinic yesterday for DL site culture for suspected driveline infection. Pt was started on doxycycline yesterday.   MD requesting guidance of further treatment or work-up. Instructed MD to page Cardiologist on call to review findings and develop plan. MD will call  to get in touch with Cardiology.

## 2020-11-06 NOTE — TELEPHONE ENCOUNTER
D:  Pt presented to [place] ED with c/o shortness of breath, chest pain.  ED called to report his troponin was 0.06 (lower than last test here), lactate was 2.2, creatinine was 1.8, WBC 9.4, Hgb 10.1.  EKG unchanged, and ST with RBBB.  Doxycycline started yesterday.  Pt denies fever, chills, endorses tenderness at driveline exit site as he did the last 2 days.  No evidence of fluid overload.  I:  Reviewed most recent labs from [facility] with those reported, and seem primarily unchanged. In discussion, ED plans to discharge him with instructions to return if changes in condition.  Will also advise him to call the LVAD Coordinator on call tomorrow for an update.  Advised that he is welcome to come to [facility] ED if his condition changes as well.  A: Chest pain, ? Related to DLES infection?  P:  Monitor symptoms., follow up with patient in a.m.

## 2020-11-08 ENCOUNTER — TELEPHONE (OUTPATIENT)
Dept: CARDIOLOGY | Facility: CLINIC | Age: 65
End: 2020-11-08

## 2020-11-09 NOTE — TELEPHONE ENCOUNTER
RECORDS RECEIVED FROM: Internal   DATE RECEIVED: 11.18.2020   NOTES (Gather within 2 years) STATUS DETAILS   OFFICE NOTE from referring provider   Internal 11.05.2020 Nader Cisneros MD   OFFICE NOTE from other specialist N/A    DISCHARGE SUMMARY from hospital N/A    DISCHARGE REPORT from the ER N/A    LABS (any labs) Internal / CE    MEDICATION LIST Internal /CE    IMAGING  (NEED IMAGES AND REPORTS)     Osteomyelitis: Foot imaging  N/A    Liver Abscess: Abdominal imaging N/A    Other (anything related to diagnoses N/A

## 2020-11-09 NOTE — TELEPHONE ENCOUNTER
Marissa Corcoran 11 minutes ago (11:37 AM)     I spoke with the pt and he was fine with this Thursday at 8am with Dr. Bhatti (video visit).

## 2020-11-09 NOTE — TELEPHONE ENCOUNTER
D:  Received a call from the [facility] that the cultures performed on the driveline demonstrated that the bacteria (Staph aureus) was resistant to the doxycycline currently prescribed.  They sent the culture report for all potential antibiotics.  I:  This writer spoke with Dr. Judd who recommended changing his antibiotics, discontinuing the doxycycline and starting Bactrim Single Strength, 1 tablet orally twice daily for 14 days.  She also recommended that he have a CMP and CBC on Thursday, 11/12.  I spoke with the patient who was happy to make the change since he was not convinced that the doxycycline was helping although drainage had reduced.  He stated that the pharmacy is not open on Sunday evening, so I will contact the pharmacy in the morning to provide the prescription from Dr. Judd.  He also told me that he remains tired and had gained about 5 pounds (now 220, previously 215) over the last couple of days so took an extra Lasix 40 mg and an extra potassium pill today.  He reported that he has not had a large amount of urine production after the second dose.  He reported that his device flow is good: (HM 3) Speed 5500, Flow 4.6, PI 5.0, Power 4.6.  He understood and agreed with the plan.  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  P: Change antibiotics starting tomorrow, follow up labs on Thursday.

## 2020-11-11 ENCOUNTER — ANTICOAGULATION THERAPY VISIT (OUTPATIENT)
Dept: ANTICOAGULATION | Facility: CLINIC | Age: 65
End: 2020-11-11

## 2020-11-11 ENCOUNTER — CARE COORDINATION (OUTPATIENT)
Dept: CARDIOLOGY | Facility: CLINIC | Age: 65
End: 2020-11-11

## 2020-11-11 DIAGNOSIS — I50.22 CHRONIC SYSTOLIC CONGESTIVE HEART FAILURE (H): ICD-10-CM

## 2020-11-11 DIAGNOSIS — Z95.811 LVAD (LEFT VENTRICULAR ASSIST DEVICE) PRESENT (H): ICD-10-CM

## 2020-11-11 NOTE — PROGRESS NOTES
Patient reports he will go in for an INR check next week.  Patient also reports he has been gaining weight and having increased SOB even with increased diuretic use.  Writer will alert LVAD team.

## 2020-11-11 NOTE — LETTER
Patient Name: Eliseo Tanner   : 1955   Diagnosis/ICD-10: Heart Failure, unspecified I50.9; LVAD Z95.811   Requesting Physician: Dr. Nader Cisneros   Date of Request: 20   Date to Draw 2020             **Please fax results to Valeria Harris RN VAD Coord at [phone].                               Call [phone] with Questions    ORDER CODE TESTS NANCY.VOL.   X CBASIC Na, K, Cl, CO2, Crea, BUN, Glu, Ca GG 0.5-1    BHEPAT Alb, AlkP, ALT, AST, BBil, TP GG 0.5-1    BLIP Chol, Trig, HDL, LDL GG 1-2    CCOMP Na, K, Cl, CO2, Crea, BUN, Glu, Ca, Alb, AlkP, ALT, AST, Bbil, TP,  GG 0.6-1    HGP CBC & Platelet P 0.3-1    HGDP CBC, Differential & Platelet P 0.3-1    PLT Platelet  P 0.3-1    INR INR B 2.7    PTT PTT B 2.7    LD Lactate Dehydrogenase GG 0.3-1    PHGB Plasma Hemoglobin GG 2-3    Pre-Albumin Pre-Albumin RG 1.0                   Signed,      Nader Cisneros MD  Heart Failure, Mechanical Circulatory Support and Transplant Cardiology   of Medicine,  Division of Cardiology, [facility]

## 2020-11-11 NOTE — PROGRESS NOTES
[facility]  General Infectious Disease Clinic Note:  New Patient     Patient:  Eliseo Tanner, Date of birth 1955, Medical record number [id number]  Date of Visit:  11/11/2020  Consult requested by Dr. Mcarthur  for evaluation of LVAD infection         Assessment and Recommendations:   Assessment:  63 y/o male with chronic systolic HF and NICM s/p HM 3 on 2/18/2020 c/b polymicrobial bacteremia (P mirabilis and E faecalis), ABHINAV on CPAP, type II DM, CKD stage III with concerns for an LVAD infection.     1. MSSA driveline infection: developed pain followed by drainage~ 11/1. Initially started on doxycycline on 11/5 but switched to tmp/smx when the culture revealed MSSA resistant to doxy-subsequently switched to tmp/smx 1 S.S. BID. Blood cultures obtained which were negative. After starting the tmp/smx there has been some improvement in the pain and drainage although the drainage has not resolved. Will obtain an US of the VAD exit site to ensure there is not a fluid collection present underneath exit site. Hesistant to do CT w/ contrast due to renal function. He continues to have intermittent chills although he does not relate it to the onset of the infection. Will obtain labs and another set of blood cultures.     2. Non-productive dry cough: patient endorses that a cough started about 1 week ago. Non-productive. Not associated fevers but does endorse decreased appetite. Otherwise denies nausea, vomiting, diarrhea. He senses that he has fluid accumulation especially in the setting of his recent weight gain.      Historical Infectious Diseases Issues:  2/2020 P mirabilis and E faecalis bacteremia post VAD placement    Recommendations:  1. Obtain US of LVAD exit site to assess for a fluid collection  2. Continue tmp/smx 1 S.S. tablet BID for now. Pending today's labs I may need to adjust dose or change to a different antibiotic (cephalexin vs linezolid).   3. I called the  anticoagulation lab to alert them to him being on warfarin and tmp/smx. They will get an INR tomorrow.   3. Obtain CBC w/ diff, CMP and repeat another set of blood cultures  4. He had a CXR a few days ago-will attempt to get results  5. Will alert coordinator to the concern for fluid overload-lasix dose adjusted last night and today  4. Currently planning for a 2 week course of tmp/smx from 11/9-11/23 but would like to arrange follow up before stopping to ensure ongoing clinical improvement  5. Asked the wife to send in photos of the exit site via [employer] or through the coordinator    Negar Bhatti DO  Pager [phone]  Transplant Infectious Diseases         History of the Infectious Disease lllness:   HPI:  65 y/o male with chronic systolic HF and NICM s/p HM 3 on 2/18/2020, ABHINAV on CPAP, type II DM, CKD stage III with concerns for an LVAD infection. Post VAD complication he developed a retrosternal hematoma and bleeding as well as Proteus mirabilis and E faecalis bacteremia s/p 2 weeks of ampicillin and ceftriaxone. Also had an ICD placed on 3/16/2020 due to VT and A fib post op. Also follows with nephrology for renal disease and progressive creatinine increase.  On 11/4 patient called to report new redness, tenderness, bloody drainage x 5 days at drive line site  On 11/5 he was started on doxycycline due to concerns for infection. CBC w/o leukocytosis. Culture on 11/5 grew large amounts of MSSA (R: to doxycycline). Blood culture negative.   On 11/6 he went to the ED for SOB.   On 11/8 doxycyline was changed to tmp/smx 1 S.S. BID x 14 days    Mr. Tanner discussed today that the pain around the LVAD exit site started 4 days prior to the onset of drainage. The wife was not present on the phone who is the primary caretaker who does the dressing changes. He thinks the drainage was yellow and red. He has had chills on/off but this was not related to the timing of the onset of infection. The drainage did not improve  after starting the doxycycline. After switching to the tmp/smx the drainage has improved slightly and the pain around the exit site has improved significantly. He developed a non-productive dry cough x 1 week. He feels like he is getting food stuck in his throat when eating. Has a decreased appetite x 1 week. Denies nausea, vomiting, diarrhea. No known sick contacts. Lives with wife and daughter who have been well. He feels like he has extra fluid accumulating. Does not have edema in lower extremities-but that does not typically occur when he accumulates fluid.     Review of Systems:   CONSTITUTIONAL:  No fevers, + occasional chills. No night sweats.  EYES: negative for icterus or acute vision changes.   ENT:  negative for hearing loss, tinnitus or sore throat  RESPIRATORY:  + non-productive cough, + SOB  CARDIOVASCULAR:  negative for chest pain, heart palpitations  GASTROINTESTINAL:  negative for nausea, vomiting, diarrhea or constipation; decreased appetite  GENITOURINARY:  negative for dysuria or hematuria.  INTEGUMENT:  negative for rash or pruritus; exit vad site drainage improved but not resolved. Pain also improved.   NEURO:  Negative for headache or tremor.    No antibiotic allergies    Past Surgical History:   Procedure Laterality Date     ANESTHESIA CARDIOVERSION N/A 2/28/2020    Procedure: ANESTHESIA, FOR CARDIOVERSION;  Surgeon: GENERIC ANESTHESIA PROVIDER;  Location: [facility] OR     CV CENTRAL VENOUS CATHETER PLACEMENT N/A 2/13/2020    Procedure: Central Venous Catheter Placement;  Surgeon: Chente Moss MD;  Location: [facility] CARDIAC CATH LAB     CV INTRA-AORTIC BALLOON PUMP INSERTION N/A 2/7/2020    Procedure: Intra-Aortic Balloon Pump Insertion;  Surgeon: Jose Baldwin MD;  Location: [facility] CARDIAC CATH LAB     CV INTRA-AORTIC BALLOON PUMP INSERTION N/A 2/13/2020    Procedure: Intra-Aortic Balloon Pump Insertion;  Surgeon: Chente Moss MD;  Location: [facility]  CARDIAC CATH LAB     CV RIGHT HEART CATH N/A 2020    Procedure: CV RIGHT HEART CATH;  Surgeon: Dalton Baeza MD;  Location:  HEART CARDIAC CATH LAB     CV SWAN LUCIANA PROCEDURE N/A 2020    Procedure: Munday Luciana Procedure;  Surgeon: Chente Moss MD;  Location:  HEART CARDIAC CATH LAB     EP ICD Bilateral 3/16/2020    Procedure: EP ICD;  Surgeon: Dali Day MD;  Location:  HEART CARDIAC CATH LAB     INSERT VENTRICULAR ASSIST DEVICE LEFT (HEARTMATE II) N/A 2020    Procedure: INSERTION, LEFT VENTRICULAR ASSIST DEVICE (HEARTMATE III);  Surgeon: Mac Jaramillo MD;  Location:  OR     THORACOSCOPY Right 3/6/2020    Procedure: RIGHT VIDEO-ASSISTED THORASCOPIC SURGERY, EVACUATION OF HEMOTHORAX, PLACEMENT OF CHEST TUBES;  Surgeon: William Gan MD;  Location:  OR       History reviewed. No pertinent family history.     Social History     Social History Narrative     Not on file     Social History     Tobacco Use     Smoking status: Former Smoker     Quit date: 1994     Years since quittin.6     Smokeless tobacco: Never Used   Substance Use Topics     Alcohol use: Not on file     Drug use: Not on file       Immunization History   Administered Date(s) Administered     Flu, Unspecified 11/15/2019     Influenza (High Dose) 3 valent vaccine 10/25/2019     Pneumo Conj 13-V (2010&after) 2018       Patient Active Problem List   Diagnosis     Acute on chronic systolic congestive heart failure (H)     Anxiety     CKD (chronic kidney disease) stage 3, GFR 30-59 ml/min     Coronary artery disease involving native coronary artery of native heart without angina pectoris     GERD (gastroesophageal reflux disease)     Gout     Hyperlipidemia with target LDL less than 100     Nonischemic cardiomyopathy (H)     Non-nephrotic range proteinuria     ABHINAV on CPAP     Type 2 diabetes mellitus with stage 3 chronic kidney disease, without long-term current use of insulin (H)      Heart failure (H)     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       Allergies   Allergen Reactions     Heparin      HIT screen positive 2/14/20, reflex DAVINA negative; however heme recommended treating as if positive  HIT screen negative 2/11/20     Oxycodone Itching and Other (See Comments)     Chlorhexidine Rash              Physical Exam:   Vitals were reviewed.  All vitals stable  There were no vitals taken for this visit.  Wt Readings from Last 4 Encounters:   09/21/20 98 kg (216 lb)   09/21/20 98 kg (216 lb)   05/19/20 93 kg (205 lb)   04/14/20 91.4 kg (201 lb 6.4 oz)     Exam: telephone visit. No acute distress. Appropriate conversation. .         Laboratory Data:     Inflammatory Markers    Recent Labs   Lab Test 11/05/20 09/21/20  1438 03/04/20  0757 02/08/20  0408   CRP 54.9 6.6 77.0* 21.0*     Metabolic Studies    Recent Labs   Lab Test 09/21/20  1440 09/21/20  1438 09/21/20  0851 09/09/20  0000 09/09/20 04/08/20  0919 04/08/20  0919 03/06/20  0322 03/06/20  0322 02/13/20  0206 02/13/20  0206 02/08/20  0408 02/08/20  0408   NA  --  139 136  --  136   < > 135   < > 135   < > 132*   < > 134   POTASSIUM  --  4.4 4.4   < > 4.5   < > 4.1   < > 4.1   < > 4.1  4.2   < > 3.5   CHLORIDE  --  106 106  --  103   < > 101   < > 104   < > 96   < > 103   CO2  --  25 26  --  26   < > 25   < > 30   < > 22   < > 23   ANIONGAP  --  8 5  --  7   < > 9   < > 1*   < > 14   < > 8   BUN  --  56* 62*  --  40   < > 26   < > 16   < > 34*   < > 50*   CR  --  2.26* 2.33*  --  1.7   < > 1.28*   < > 1.02   < > 1.58*   < > 2.81*   GFRESTIMATED  --  29* 28*  --  43   < > 58*   < > 76   < > 45*   < > 22*   GLC  --   145* 138*  --  134*   < > 148*   < > 149*   < > 154*   < > 155*   CALOS  --  8.9 8.9  --  8.8   < > 9.0   < > 8.0*   < > 8.9   < > 8.2*   PHOS  --   --   --   --   --   --   --   --  3.8   < > 5.4*   < > 5.0*   MAG  --   --   --   --   --   --  1.9   < > 2.2   < > 2.0   < >  --    URIC  --   --   --   --   --   --   --   --   --   --   --   --  7.5*   LACT 1.3  --   --   --   --   --   --    < >  --    < > 9.5*   < > 0.6*   CKT  --   --   --   --   --   --   --   --   --   --  39  --   --     < > = values in this interval not displayed.       Hepatic Studies    Recent Labs   Lab Test 10/16/20 09/24/20 09/21/20  1438   BILITOTAL 0.6 0.4 0.6   DBIL 0.0 0.0 0.2   ALKPHOS 139 96.0 117   PROTTOTAL 8.4 7.3 8.4   ALBUMIN 4.3 3.9 3.8   AST 70 89.0 136*   ALT 88 80 118*     --  421*       Pancreatitis testing    Recent Labs   Lab Test 02/08/20  0408   TRIG 57       Lipid testing    Recent Labs   Lab Test 02/08/20  0408   CHOL 118   HDL 71   LDL 35   TRIG 57       Gout Labs      Recent Labs   Lab Test 02/08/20  0408   URIC 7.5*       Hematology Studies     Recent Labs   Lab Test 11/05/20 09/21/20  1438 07/13/20 06/16/20 03/23/20  1146 03/23/20  1146   WBC 9.4 7.2 7.0 7.0   < > 9.8   ANEU  --  5.3  --   --   --  6.1   ALYM  --  1.1  --   --   --  1.6   GIULIANO  --  0.7  --   --   --  0.9   AEOS  --  0.1  --   --   --  1.0*   HGB 10.1* 10.9* 10.9* 10.7*   < > 9.2*   HCT 33.7 36.8* 36 35.5   < > 32.3*    315 382 378   < > 514*    < > = values in this interval not displayed.       Clotting Studies    Recent Labs   Lab Test 11/04/20 10/02/20 09/21/20  1438 09/21/20  0836 03/20/20  0530 03/20/20  0530   INR 2.4* 2.3* 1.96* 2.10*   < > 2.84*   PTT  --   --   --   --   --  59*    < > = values in this interval not displayed.       Iron Testing    Recent Labs   Lab Test 11/05/20 02/08/20  0408 02/08/20  0408   IRON  --   --  25*   FEB  --   --  306   IRONSAT  --   --  8*   HEAVENLY  --   --  189   MCV 69   < > 87   B12  --   --   752    < > = values in this interval not displayed.     Arterial Blood Gas Testing    Recent Labs   Lab Test 03/15/20  2235 03/06/20  1615 03/06/20  1550 02/26/20  1321 02/24/20  0345 02/24/20  0345 02/23/20  1955   PH 7.53* 7.41 7.39  --   --  7.46* 7.47*   PCO2 35 42 45  --   --  42 37   PO2 126* 96 49*  --   --  123* 81   HCO3 29* 26 27  --   --  30* 27   O2PER 21 65 65 21.0   < > 2L  2L 21    < > = values in this interval not displayed.        Thyroid Studies     Recent Labs   Lab Test 02/08/20  0408   TSH 1.72       Urine Studies     Recent Labs   Lab Test 02/22/20  0944 02/13/20  0334 02/07/20 2029   URINEPH 5.5 6.0 5.0   NITRITE Negative Negative Negative   LEUKEST Small* Small* Negative   WBCU 2 81* 3       Medication levels    Recent Labs   Lab Test 02/16/20  0400 02/13/20  2147 02/13/20  2147   VANCOMYCIN 18.5   < >  --    TOBRA  --   --  13.0    < > = values in this interval not displayed.     Microbiology:  Culture Result Large amount Staphylococcus aureus (A)     Specimen Collected on   Culture - Specimen from abdominal cavity (specimen) 11/5/2020 3:10 PM   Result Narrative    SLM2:SOURCE: WOUND  SITE:SITE: RIGHT UPPER ABDOMEN DRIVE LINE  ANAEROBIC AND AEROBIC   REASON FOR TEST? DRAINAGE   Organism Antibiotic Method Susceptibility   Staphylococcus aureus Clindamycin DONTA <=0.25 ug/mL: Sensitive    Erythromycin DONTA <=0.25 ug/mL: Sensitive    Gentamicin DONTA <=0.5 ug/mL: Sensitive    Oxacillin DONTA 1.0 ug/mL: Sensitive    Comment: Oxacillin predicts susceptibility to cefazolin, cephalexin, nafcillin and dicloxacillin.    Rifampicin DONTA <=0.5 ug/mL: Sensitive    Comment: Rifampicin should not be used alone for antimicrobial therapy.    Tetracycline DONTA >=16.0 ug/mL: Resistant    Trimethoprim/Sulfamethoxazole DONTA <=10.0 ug/mL: Sensitive    Vancomycin DONTA 1.0 ug/mL: Sensitive    Comment: Oral Vancomycin is NEVER a treatment option. Vancomycin is NOT absorbed when orally administered.           Last Culture  results with specimen source  Culture Micro   Date Value Ref Range Status   09/21/2020 No growth  Final   09/21/2020 No growth  Final   03/13/2020 No growth  Final   03/13/2020 No growth  Final   03/03/2020 No growth  Final   03/03/2020 No growth  Final   02/22/2020 No growth  Final   02/22/2020 No growth  Final   02/16/2020 No growth  Final   02/16/2020 No growth  Final   02/15/2020 No growth  Final   02/15/2020 (A)  Final    Cultured on the 2nd day of incubation:  Staphylococcus epidermidis     02/15/2020   Final    Critical Value/Significant Value, preliminary result only, called to and read back by  Cee Benavidez RN on 2.16.20 at 2220.  JRT     02/15/2020   Final    (Note)  POSITIVE for STAPHYLOCOCCUS EPIDERMIDIS and POSITIVE for the mecA  gene (resistant to methicillin) by [employer] multiplex nucleic acid  test. Final identification and antimicrobial susceptibility testing  will be verified by standard methods.    Specimen tested with Verigene multiplex, gram-positive blood culture  nucleic acid test for the following targets: Staph aureus, Staph  epidermidis, Staph lugdunensis, other Staph species, Enterococcus  faecalis, Enterococcus faecium, Streptococcus species, S. agalactiae,  S. anginosus grp., S. pneumoniae, S. pyogenes, Listeria sp., mecA  (methicillin resistance) and Ernst/B (vancomycin resistance).    Critical Value/Significant Value called to and read back by Cee Benavidez RN, 2.17.20 @ 0207 pt.     02/14/2020 No growth  Final   02/13/2020 (A)  Final    Cultured on the 1st day of incubation:  Proteus mirabilis  Susceptibility testing done on previous specimen     02/13/2020   Final    Critical Value/Significant Value, preliminary result only, called to and read back by  Mima Cabrera RN. @1426. 2.13.20. BS.      02/13/2020 (A)  Final    Cultured on the 1st day of incubation:  Enterococcus faecalis  Susceptibility testing done on previous specimen     02/13/2020   Final    Critical  Value/Significant Value, preliminary result only, called to and read back by  Alisson Castillo RN on 2.13.20 at 1728.  T     02/13/2020   Final    (Note)  POSITIVE for ENTEROCOCCUS FAECALIS and NEGATIVE for Ernst/vanB genes  by Verigene multiplex nucleic acid test. Final identification and  antimicrobial susceptibility testing will be verified by standard  methods.    Specimen tested with Verigene multiplex, gram-positive blood culture  nucleic acid test for the following targets: Staph aureus, Staph  epidermidis, Staph lugdunensis, other Staph species, Enterococcus  faecalis, Enterococcus faecium, Streptococcus species, S. agalactiae,  S. anginosus grp., S. pneumoniae, S. pyogenes, Listeria sp., mecA  (methicillin resistance) and Ernst/B (vancomycin resistance).    Critical Value/Significant Value called to and read back by MARIA EUGENIA MILLAN RN 2/13/20 2036 EH         02/13/2020 (A)  Final    Cultured on the 1st day of incubation:  Proteus mirabilis     02/13/2020   Final    Critical Value/Significant Value, preliminary result only, called to and read back by  Mima Cabrera RN. @1426. 2.13.20. BS.      02/13/2020 (A)  Final    Cultured on the 1st day of incubation:  Enterococcus faecalis     02/13/2020   Final    Critical Value/Significant Value, preliminary result only, called to and read back by  Alisson Leon RN on 2.13.20 at 1728.  T     02/13/2020   Final    (Note)  POSITIVE for PROTEUS SPECIES by Verigene multiplex nucleic acid test.  Final identification and antimicrobial susceptibility testing will be  verified by standard methods.    Specimen tested with Verigene multiplex, gram-negative blood culture  nucleic acid test for the following targets: Acinetobacter sp.,  Citrobacter sp., Enterobacter sp., Proteus sp., E. coli, K.  pneumoniae/oxytoca, P. aeruginosa, and the following resistance  markers: CTXM, KPC, NDM, VIM, IMP and OXA.    Critical Value/Significant Value called to and read back  by  Alisson Leon RN @ 1650. 2/13/20. AV     02/13/2020 No growth  Final    Specimen Description   Date Value Ref Range Status   09/21/2020 Blood Left Arm  Final   09/21/2020 Blood Right Arm  Final   03/13/2020 Blood Left Hand  Final   03/13/2020 Blood Right Hand  Final   03/03/2020 Blood Left Hand  Final   03/03/2020 Blood Right Arm  Final   02/22/2020 Blood Right Hand  Final   02/22/2020 Blood Right Hand  Final   02/19/2020 Nares  Final   02/16/2020 Blood Right Hand  Final   02/16/2020 Blood Left Hand  Final   02/15/2020 Blood Right Hand  Final   02/15/2020 Blood Left Hand  Final   02/14/2020 Blood Right Hand  Final   02/13/2020 Blood Left Hand  Final   02/13/2020 Blood Right Hand  Final   02/13/2020 Nasopharyngeal  Final   02/13/2020 Catheterized Urine  Final        Quantiferon testing   Recent Labs   Lab Test 11/05/20 09/21/20  1438 02/12/20  0440 02/12/20  0440 02/08/20  0408   TBRES  --   --   --  Negative Negative   LYMPH 13.5 14.7   < > 4.8 17.5    < > = values in this interval not displayed.     Virology:  Respiratory virus testing    Recent Labs   Lab Test 02/13/20  0334   INFZA Negative   INFZB Negative   IRSV Negative     Hepatitis B Testing     Recent Labs   Lab Test 02/12/20  0440 02/08/20  0408   AUSAB 0.69 1.99   HBCAB Nonreactive Nonreactive   HEPBANG Nonreactive Nonreactive      Hepatitis C Antibody   Date Value Ref Range Status   02/12/2020 Nonreactive NR^Nonreactive Final     Comment:     Assay performance characteristics have not been established for newborns,   infants, and children     02/08/2020 Nonreactive NR^Nonreactive Final     Comment:     Assay performance characteristics have not been established for newborns,   infants, and children         CMV Antibody IgG   Date Value Ref Range Status   02/12/2020 >8.0 (H) 0.0 - 0.8 AI Final     Comment:     Positive  Antibody index (AI) values reflect qualitative changes in antibody   concentration that cannot be directly associated with  clinical condition or   disease state.     02/08/2020 7.8 (H) 0.0 - 0.8 AI Final     Comment:     Positive  Antibody index (AI) values reflect qualitative changes in antibody   concentration that cannot be directly associated with clinical condition or   disease state.       Varicella Zoster Virus Antibody IgG   Date Value Ref Range Status   02/12/2020 2.0 (H) 0.0 - 0.8 AI Final     Comment:     Positive, suggests prev. exposure and probable immunity  Antibody index (AI) values reflect qualitative changes in antibody   concentration that cannot be directly associated with clinical condition or   disease state.     02/08/2020 2.4 (H) 0.0 - 0.8 AI Final     Comment:     Positive, suggests prev. exposure and probable immunity  Antibody index (AI) values reflect qualitative changes in antibody   concentration that cannot be directly associated with clinical condition or   disease state.       Toxoplasma Antibody IgG   Date Value Ref Range Status   02/12/2020 <3.0 0.0 - 7.1 IU/mL Final     Comment:     Negative- Absence of detectable Toxoplasma gondii IgG antibodies. A negative   result does not rule out acute infection.  The magnitude of the measured result is not indicative of the amount of   antibody present. The concentrations of anti-Toxoplasma gondii IgG in a given   specimen determined with assays from different manufacturers can vary due to   differences in assay methods and reagent specificity.     02/08/2020 <3.0 0.0 - 7.1 IU/mL Final     Comment:     Negative- Absence of detectable Toxoplasma gondii IgG antibodies. A negative   result does not rule out acute infection.  The magnitude of the measured result is not indicative of the amount of   antibody present. The concentrations of anti-Toxoplasma gondii IgG in a given   specimen determined with assays from different manufacturers can vary due to   differences in assay methods and reagent specificity.       Imaging:  Results for orders placed or performed in  visit on 09/21/20   X-ray Chest 2 vws*    Narrative    EXAM: XR CHEST 2 VW  9/21/2020 2:32 PM     HISTORY:  SOB At rest; LVAD (left ventricular assist device) present  (H)       COMPARISON:  4/2/2020    FINDINGS:   Single lead cardiac pacer generator and LVAD appear intact.    The trachea is midline. The cardiomediastinal silhouette is enlarged  and well-defined. The pulmonary vasculature are distinct. No acute  airspace opacity, pleural effusion or appreciable pneumothorax.    No acute osseous abnormalities. The included soft tissues and upper  abdomen and are within normal limits.        Impression    IMPRESSION: No acute airspace opacity.     I have personally reviewed the examination and initial interpretation  and I agree with the findings.    QUIQUE NICHOLS MD

## 2020-11-12 ENCOUNTER — VIRTUAL VISIT (OUTPATIENT)
Dept: INFECTIOUS DISEASES | Facility: CLINIC | Age: 65
End: 2020-11-12
Attending: INTERNAL MEDICINE
Payer: MEDICARE

## 2020-11-12 ENCOUNTER — CARE COORDINATION (OUTPATIENT)
Dept: CARDIOLOGY | Facility: CLINIC | Age: 65
End: 2020-11-12

## 2020-11-12 ENCOUNTER — TELEPHONE (OUTPATIENT)
Dept: INFECTIOUS DISEASES | Facility: CLINIC | Age: 65
End: 2020-11-12

## 2020-11-12 ENCOUNTER — ANTICOAGULATION THERAPY VISIT (OUTPATIENT)
Dept: ANTICOAGULATION | Facility: CLINIC | Age: 65
End: 2020-11-12

## 2020-11-12 VITALS — BODY MASS INDEX: 34.14 KG/M2 | WEIGHT: 218 LBS

## 2020-11-12 DIAGNOSIS — I50.22 CHRONIC SYSTOLIC CONGESTIVE HEART FAILURE (H): ICD-10-CM

## 2020-11-12 DIAGNOSIS — Z95.811 LVAD (LEFT VENTRICULAR ASSIST DEVICE) PRESENT (H): ICD-10-CM

## 2020-11-12 DIAGNOSIS — I50.814 RIGHT HEART FAILURE SECONDARY TO LEFT HEART FAILURE (H): ICD-10-CM

## 2020-11-12 DIAGNOSIS — T82.9XXA COMPLICATION INVOLVING LEFT VENTRICULAR ASSIST DEVICE (LVAD), INITIAL ENCOUNTER: Primary | ICD-10-CM

## 2020-11-12 DIAGNOSIS — N18.4 CKD (CHRONIC KIDNEY DISEASE) STAGE 4, GFR 15-29 ML/MIN (H): ICD-10-CM

## 2020-11-12 LAB
ALBUMIN SERPL-MCNC: 3.7 G/DL
ALP SERPL-CCNC: 202 U/L
ALT SERPL-CCNC: 64 U/L
ANION GAP SERPL CALCULATED.3IONS-SCNC: 12 MMOL/L
AST SERPL-CCNC: 65 U/L
BILIRUB SERPL-MCNC: 0.5 MG/DL
BUN SERPL-MCNC: 41 MG/DL
CALCIUM SERPL-MCNC: 8.6 MG/DL
CHLORIDE SERPLBLD-SCNC: 101 MMOL/L
CO2 SERPL-SCNC: 22 MMOL/L
CREAT SERPL-MCNC: 2.2 MG/DL
GFR SERPL CREATININE-BSD FRML MDRD: 32 ML/MIN/1.73M2
GLUCOSE SERPL-MCNC: 174 MG/DL (ref 70–99)
POTASSIUM SERPL-SCNC: 4.5 MMOL/L
PROT SERPL-MCNC: 7.6 G/DL
SODIUM SERPL-SCNC: 135 MMOL/L

## 2020-11-12 PROCEDURE — 99443 PR PHYSICIAN TELEPHONE EVALUATION 21-30 MIN: CPT | Mod: 95 | Performed by: INTERNAL MEDICINE

## 2020-11-12 RX ORDER — SULFAMETHOXAZOLE AND TRIMETHOPRIM 400; 80 MG/1; MG/1
TABLET ORAL
Status: ON HOLD | COMMUNITY
Start: 2020-11-09 | End: 2020-11-17

## 2020-11-12 ASSESSMENT — PAIN SCALES - GENERAL: PAINLEVEL: NO PAIN (0)

## 2020-11-12 NOTE — PATIENT INSTRUCTIONS
Continue to take bactrim 1 S.S. tablet for now  You will need labs and an INR check because bactrim can increase the level of warfarin. Once I have the lab results I will review them to determine if a different antibiotic should be used based on the safety profile and medication interactions.   I would like to have a video visit prior to 11/23 so I will have clinic call to make an appointment.

## 2020-11-12 NOTE — LETTER
11/12/2020       RE: Eliseo Tanner  [address] [place] [address]     Dear Colleague,    Thank you for referring your patient, Eliseo Tanner, to the [facility] [place] at [facility]. Please see a copy of my visit note below.    [facility]  General Infectious Disease Clinic Note:  New Patient     Patient:  Eliseo Tanner, Date of birth 1955, Medical record number [id number]  Date of Visit:  11/11/2020  Consult requested by Dr. Mcarthur  for evaluation of LVAD infection         Assessment and Recommendations:   Assessment:  65 y/o male with chronic systolic HF and NICM s/p HM 3 on 2/18/2020 c/b polymicrobial bacteremia (P mirabilis and E faecalis), ABHINAV on CPAP, type II DM, CKD stage III with concerns for an LVAD infection.     1. MSSA driveline infection: developed pain followed by drainage~ 11/1. Initially started on doxycycline on 11/5 but switched to tmp/smx when the culture revealed MSSA resistant to doxy-subsequently switched to tmp/smx 1 S.S. BID. Blood cultures obtained which were negative. After starting the tmp/smx there has been some improvement in the pain and drainage although the drainage has not resolved. Will obtain an US of the VAD exit site to ensure there is not a fluid collection present underneath exit site. Hesistant to do CT w/ contrast due to renal function. He continues to have intermittent chills although he does not relate it to the onset of the infection. Will obtain labs and another set of blood cultures.     2. Non-productive dry cough: patient endorses that a cough started about 1 week ago. Non-productive. Not associated fevers but does endorse decreased appetite. Otherwise denies nausea, vomiting, diarrhea. He senses that he has fluid accumulation especially in the setting of his recent weight gain.      Historical Infectious Diseases Issues:  2/2020 P mirabilis  and E faecalis bacteremia post VAD placement    Recommendations:  1. Obtain US of LVAD exit site to assess for a fluid collection  2. Continue tmp/smx 1 S.S. tablet BID for now. Pending today's labs I may need to adjust dose or change to a different antibiotic (cephalexin vs linezolid).   3. I called the anticoagulation lab to alert them to him being on warfarin and tmp/smx. They will get an INR tomorrow.   3. Obtain CBC w/ diff, CMP and repeat another set of blood cultures  4. He had a CXR a few days ago-will attempt to get results  5. Will alert coordinator to the concern for fluid overload-lasix dose adjusted last night and today  4. Currently planning for a 2 week course of tmp/smx from 11/9-11/23 but would like to arrange follow up before stopping to ensure ongoing clinical improvement  5. Asked the wife to send in photos of the exit site via [employer] or through the coordinator    Negar Bhatti DO  Pager [phone]  Transplant Infectious Diseases         History of the Infectious Disease lllness:   HPI:  63 y/o male with chronic systolic HF and NICM s/p HM 3 on 2/18/2020, ABHINAV on CPAP, type II DM, CKD stage III with concerns for an LVAD infection. Post VAD complication he developed a retrosternal hematoma and bleeding as well as Proteus mirabilis and E faecalis bacteremia s/p 2 weeks of ampicillin and ceftriaxone. Also had an ICD placed on 3/16/2020 due to VT and A fib post op. Also follows with nephrology for renal disease and progressive creatinine increase.  On 11/4 patient called to report new redness, tenderness, bloody drainage x 5 days at drive line site  On 11/5 he was started on doxycycline due to concerns for infection. CBC w/o leukocytosis. Culture on 11/5 grew large amounts of MSSA (R: to doxycycline). Blood culture negative.   On 11/6 he went to the ED for SOB.   On 11/8 doxycyline was changed to tmp/smx 1 S.S. BID x 14 days    Mr. Tanner discussed today that the pain around the LVAD exit site  started 4 days prior to the onset of drainage. The wife was not present on the phone who is the primary caretaker who does the dressing changes. He thinks the drainage was yellow and red. He has had chills on/off but this was not related to the timing of the onset of infection. The drainage did not improve after starting the doxycycline. After switching to the tmp/smx the drainage has improved slightly and the pain around the exit site has improved significantly. He developed a non-productive dry cough x 1 week. He feels like he is getting food stuck in his throat when eating. Has a decreased appetite x 1 week. Denies nausea, vomiting, diarrhea. No known sick contacts. Lives with wife and daughter who have been well. He feels like he has extra fluid accumulating. Does not have edema in lower extremities-but that does not typically occur when he accumulates fluid.     Review of Systems:   CONSTITUTIONAL:  No fevers, + occasional chills. No night sweats.  EYES: negative for icterus or acute vision changes.   ENT:  negative for hearing loss, tinnitus or sore throat  RESPIRATORY:  + non-productive cough, + SOB  CARDIOVASCULAR:  negative for chest pain, heart palpitations  GASTROINTESTINAL:  negative for nausea, vomiting, diarrhea or constipation; decreased appetite  GENITOURINARY:  negative for dysuria or hematuria.  INTEGUMENT:  negative for rash or pruritus; exit vad site drainage improved but not resolved. Pain also improved.   NEURO:  Negative for headache or tremor.    No antibiotic allergies    Past Surgical History:   Procedure Laterality Date     ANESTHESIA CARDIOVERSION N/A 2/28/2020    Procedure: ANESTHESIA, FOR CARDIOVERSION;  Surgeon: GENERIC ANESTHESIA PROVIDER;  Location: [facility] OR     CV CENTRAL VENOUS CATHETER PLACEMENT N/A 2/13/2020    Procedure: Central Venous Catheter Placement;  Surgeon: Chente Moss MD;  Location: [facility] HEART CARDIAC CATH LAB     CV INTRA-AORTIC BALLOON PUMP INSERTION N/A  2020    Procedure: Intra-Aortic Balloon Pump Insertion;  Surgeon: Jose Baldwni MD;  Location:  HEART CARDIAC CATH LAB     CV INTRA-AORTIC BALLOON PUMP INSERTION N/A 2020    Procedure: Intra-Aortic Balloon Pump Insertion;  Surgeon: Chente Moss MD;  Location:  HEART CARDIAC CATH LAB     CV RIGHT HEART CATH N/A 2020    Procedure: CV RIGHT HEART CATH;  Surgeon: Dalton Baeza MD;  Location:  HEART CARDIAC CATH LAB     CV SWAN LUCIANA PROCEDURE N/A 2020    Procedure: [place] Luc[place] Procedure;  Surgeon: Chente Moss MD;  Location:  HEART CARDIAC CATH LAB     EP ICD Bilateral 3/16/2020    Procedure: EP ICD;  Surgeon: Dali Day MD;  Location:  HEART CARDIAC CATH LAB     INSERT VENTRICULAR ASSIST DEVICE LEFT (HEARTMATE II) N/A 2020    Procedure: INSERTION, LEFT VENTRICULAR ASSIST DEVICE (HEARTMATE III);  Surgeon: Mac Jaramillo MD;  Location:  OR     THORACOSCOPY Right 3/6/2020    Procedure: RIGHT VIDEO-ASSISTED THORASCOPIC SURGERY, EVACUATION OF HEMOTHORAX, PLACEMENT OF CHEST TUBES;  Surgeon: William Gan MD;  Location:  OR       History reviewed. No pertinent family history.     Social History     Social History Narrative     Not on file     Social History     Tobacco Use     Smoking status: Former Smoker     Quit date: 1994     Years since quittin.6     Smokeless tobacco: Never Used   Substance Use Topics     Alcohol use: Not on file     Drug use: Not on file       Immunization History   Administered Date(s) Administered     Flu, Unspecified 11/15/2019     Influenza (High Dose) 3 valent vaccine 10/25/2019     Pneumo Conj 13-V (2010&after) 2018       Patient Active Problem List   Diagnosis     Acute on chronic systolic congestive heart failure (H)     Anxiety     CKD (chronic kidney disease) stage 3, GFR 30-59 ml/min     Coronary artery disease involving native coronary artery of native heart without angina pectoris      GERD (gastroesophageal reflux disease)     Gout     Hyperlipidemia with target LDL less than 100     Nonischemic cardiomyopathy (H)     Non-nephrotic range proteinuria     ABHINAV on CPAP     Type 2 diabetes mellitus with stage 3 chronic kidney disease, without long-term current use of insulin (H)     Heart failure (H)     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       Allergies   Allergen Reactions     Heparin      HIT screen positive 2/14/20, reflex DAVINA negative; however heme recommended treating as if positive  HIT screen negative 2/11/20     Oxycodone Itching and Other (See Comments)     Chlorhexidine Rash              Physical Exam:   Vitals were reviewed.  All vitals stable  There were no vitals taken for this visit.  Wt Readings from Last 4 Encounters:   09/21/20 98 kg (216 lb)   09/21/20 98 kg (216 lb)   05/19/20 93 kg (205 lb)   04/14/20 91.4 kg (201 lb 6.4 oz)     Exam: telephone visit. No acute distress. Appropriate conversation. .         Laboratory Data:     Inflammatory Markers    Recent Labs   Lab Test 11/05/20 09/21/20  1438 03/04/20  0757 02/08/20  0408   CRP 54.9 6.6 77.0* 21.0*     Metabolic Studies    Recent Labs   Lab Test 09/21/20  1440 09/21/20  1438 09/21/20  0851 09/09/20  0000 09/09/20 04/08/20  0919 04/08/20  0919 03/06/20  0322 03/06/20  0322 02/13/20  0206 02/13/20  0206 02/08/20  0408 02/08/20  0408   NA  --  139 136  --  136   < > 135   < > 135   < > 132*   < > 134   POTASSIUM  --  4.4 4.4   < > 4.5   < > 4.1   < > 4.1   < > 4.1  4.2   < > 3.5   CHLORIDE  --  106 106  --  103   < > 101   < > 104   < > 96   < > 103   CO2  --  25 26  --  26   < > 25   < > 30   < >  22   < > 23   ANIONGAP  --  8 5  --  7   < > 9   < > 1*   < > 14   < > 8   BUN  --  56* 62*  --  40   < > 26   < > 16   < > 34*   < > 50*   CR  --  2.26* 2.33*  --  1.7   < > 1.28*   < > 1.02   < > 1.58*   < > 2.81*   GFRESTIMATED  --  29* 28*  --  43   < > 58*   < > 76   < > 45*   < > 22*   GLC  --  145* 138*  --  134*   < > 148*   < > 149*   < > 154*   < > 155*   CALOS  --  8.9 8.9  --  8.8   < > 9.0   < > 8.0*   < > 8.9   < > 8.2*   PHOS  --   --   --   --   --   --   --   --  3.8   < > 5.4*   < > 5.0*   MAG  --   --   --   --   --   --  1.9   < > 2.2   < > 2.0   < >  --    URIC  --   --   --   --   --   --   --   --   --   --   --   --  7.5*   LACT 1.3  --   --   --   --   --   --    < >  --    < > 9.5*   < > 0.6*   CKT  --   --   --   --   --   --   --   --   --   --  39  --   --     < > = values in this interval not displayed.       Hepatic Studies    Recent Labs   Lab Test 10/16/20 09/24/20 09/21/20  1438   BILITOTAL 0.6 0.4 0.6   DBIL 0.0 0.0 0.2   ALKPHOS 139 96.0 117   PROTTOTAL 8.4 7.3 8.4   ALBUMIN 4.3 3.9 3.8   AST 70 89.0 136*   ALT 88 80 118*     --  421*       Pancreatitis testing    Recent Labs   Lab Test 02/08/20  0408   TRIG 57       Lipid testing    Recent Labs   Lab Test 02/08/20 0408   CHOL 118   HDL 71   LDL 35   TRIG 57       Gout Labs      Recent Labs   Lab Test 02/08/20  0408   URIC 7.5*       Hematology Studies     Recent Labs   Lab Test 11/05/20 09/21/20  1438 07/13/20 06/16/20 03/23/20  1146 03/23/20  1146   WBC 9.4 7.2 7.0 7.0   < > 9.8   ANEU  --  5.3  --   --   --  6.1   ALYM  --  1.1  --   --   --  1.6   GIULIANO  --  0.7  --   --   --  0.9   AEOS  --  0.1  --   --   --  1.0*   HGB 10.1* 10.9* 10.9* 10.7*   < > 9.2*   HCT 33.7 36.8* 36 35.5   < > 32.3*    315 382 378   < > 514*    < > = values in this interval not displayed.       Clotting Studies    Recent Labs   Lab Test 11/04/20 10/02/20 09/21/20  1438 09/21/20  0836 03/20/20  0530 03/20/20  0530   INR 2.4* 2.3* 1.96*  2.10*   < > 2.84*   PTT  --   --   --   --   --  59*    < > = values in this interval not displayed.       Iron Testing    Recent Labs   Lab Test 11/05/20 02/08/20 0408 02/08/20 0408   IRON  --   --  25*   FEB  --   --  306   IRONSAT  --   --  8*   HEAVENLY  --   --  189   MCV 69   < > 87   B12  --   --  752    < > = values in this interval not displayed.     Arterial Blood Gas Testing    Recent Labs   Lab Test 03/15/20  2235 03/06/20  1615 03/06/20  1550 02/26/20  1321 02/24/20  0345 02/24/20  0345 02/23/20  1955   PH 7.53* 7.41 7.39  --   --  7.46* 7.47*   PCO2 35 42 45  --   --  42 37   PO2 126* 96 49*  --   --  123* 81   HCO3 29* 26 27  --   --  30* 27   O2PER 21 65 65 21.0   < > 2L  2L 21    < > = values in this interval not displayed.        Thyroid Studies     Recent Labs   Lab Test 02/08/20 0408   TSH 1.72       Urine Studies     Recent Labs   Lab Test 02/22/20  0944 02/13/20  0334 02/07/20 2029   URINEPH 5.5 6.0 5.0   NITRITE Negative Negative Negative   LEUKEST Small* Small* Negative   WBCU 2 81* 3       Medication levels    Recent Labs   Lab Test 02/16/20  0400 02/13/20  2147 02/13/20 2147   VANCOMYCIN 18.5   < >  --    TOBRA  --   --  13.0    < > = values in this interval not displayed.     Microbiology:  Culture Result Large amount Staphylococcus aureus (A)     Specimen Collected on   Culture - Specimen from abdominal cavity (specimen) 11/5/2020 3:10 PM   Result Narrative    SLM2:SOURCE: WOUND  SITE:SITE: RIGHT UPPER ABDOMEN DRIVE LINE  ANAEROBIC AND AEROBIC   REASON FOR TEST? DRAINAGE   Organism Antibiotic Method Susceptibility   Staphylococcus aureus Clindamycin DONTA <=0.25 ug/mL: Sensitive    Erythromycin DONTA <=0.25 ug/mL: Sensitive    Gentamicin DONTA <=0.5 ug/mL: Sensitive    Oxacillin DONTA 1.0 ug/mL: Sensitive    Comment: Oxacillin predicts susceptibility to cefazolin, cephalexin, nafcillin and dicloxacillin.    Rifampicin DONTA <=0.5 ug/mL: Sensitive    Comment: Rifampicin should not be used alone for  antimicrobial therapy.    Tetracycline DONTA >=16.0 ug/mL: Resistant    Trimethoprim/Sulfamethoxazole DONTA <=10.0 ug/mL: Sensitive    Vancomycin DONTA 1.0 ug/mL: Sensitive    Comment: Oral Vancomycin is NEVER a treatment option. Vancomycin is NOT absorbed when orally administered.           Last Culture results with specimen source  Culture Micro   Date Value Ref Range Status   09/21/2020 No growth  Final   09/21/2020 No growth  Final   03/13/2020 No growth  Final   03/13/2020 No growth  Final   03/03/2020 No growth  Final   03/03/2020 No growth  Final   02/22/2020 No growth  Final   02/22/2020 No growth  Final   02/16/2020 No growth  Final   02/16/2020 No growth  Final   02/15/2020 No growth  Final   02/15/2020 (A)  Final    Cultured on the 2nd day of incubation:  Staphylococcus epidermidis     02/15/2020   Final    Critical Value/Significant Value, preliminary result only, called to and read back by  Cee Benavidez RN on 2.16.20 at 2220.  JRT     02/15/2020   Final    (Note)  POSITIVE for STAPHYLOCOCCUS EPIDERMIDIS and POSITIVE for the mecA  gene (resistant to methicillin) by Kiiigene multiplex nucleic acid  test. Final identification and antimicrobial susceptibility testing  will be verified by standard methods.    Specimen tested with Verigene multiplex, gram-positive blood culture  nucleic acid test for the following targets: Staph aureus, Staph  epidermidis, Staph lugdunensis, other Staph species, Enterococcus  faecalis, Enterococcus faecium, Streptococcus species, S. agalactiae,  S. anginosus grp., S. pneumoniae, S. pyogenes, Listeria sp., mecA  (methicillin resistance) and Ernst/B (vancomycin resistance).    Critical Value/Significant Value called to and read back by Cee Benavidez RN, 2.17.20 @ 0207 pt.     02/14/2020 No growth  Final   02/13/2020 (A)  Final    Cultured on the 1st day of incubation:  Proteus mirabilis  Susceptibility testing done on previous specimen     02/13/2020   Final    Critical  Value/Significant Value, preliminary result only, called to and read back by  Mima Cabrera RN. @1426. 2.13.20. BS.      02/13/2020 (A)  Final    Cultured on the 1st day of incubation:  Enterococcus faecalis  Susceptibility testing done on previous specimen     02/13/2020   Final    Critical Value/Significant Value, preliminary result only, called to and read back by  Alisson Castillo RN on 2.13.20 at 1728.  JRT     02/13/2020   Final    (Note)  POSITIVE for ENTEROCOCCUS FAECALIS and NEGATIVE for Ernst/vanB genes  by Verigene multiplex nucleic acid test. Final identification and  antimicrobial susceptibility testing will be verified by standard  methods.    Specimen tested with Verigene multiplex, gram-positive blood culture  nucleic acid test for the following targets: Staph aureus, Staph  epidermidis, Staph lugdunensis, other Staph species, Enterococcus  faecalis, Enterococcus faecium, Streptococcus species, S. agalactiae,  S. anginosus grp., S. pneumoniae, S. pyogenes, Listeria sp., mecA  (methicillin resistance) and Ernst/B (vancomycin resistance).    Critical Value/Significant Value called to and read back by MARIA EUGENIA MILLAN RN 2/13/20 2036          02/13/2020 (A)  Final    Cultured on the 1st day of incubation:  Proteus mirabilis     02/13/2020   Final    Critical Value/Significant Value, preliminary result only, called to and read back by  Mima Cabrera RN. @1426. 2.13.20. BS.      02/13/2020 (A)  Final    Cultured on the 1st day of incubation:  Enterococcus faecalis     02/13/2020   Final    Critical Value/Significant Value, preliminary result only, called to and read back by  Alisson Leon RN on 2.13.20 at 1728.  JRT     02/13/2020   Final    (Note)  POSITIVE for PROTEUS SPECIES by Verigene multiplex nucleic acid test.  Final identification and antimicrobial susceptibility testing will be  verified by standard methods.    Specimen tested with Verigene multiplex, gram-negative blood culture  nucleic  acid test for the following targets: Acinetobacter sp.,  Citrobacter sp., Enterobacter sp., Proteus sp., E. coli, K.  pneumoniae/oxytoca, P. aeruginosa, and the following resistance  markers: CTXM, KPC, NDM, VIM, IMP and OXA.    Critical Value/Significant Value called to and read back by  Alisson Leon RN @ 1650. 2/13/20. AV     02/13/2020 No growth  Final    Specimen Description   Date Value Ref Range Status   09/21/2020 Blood Left Arm  Final   09/21/2020 Blood Right Arm  Final   03/13/2020 Blood Left Hand  Final   03/13/2020 Blood Right Hand  Final   03/03/2020 Blood Left Hand  Final   03/03/2020 Blood Right Arm  Final   02/22/2020 Blood Right Hand  Final   02/22/2020 Blood Right Hand  Final   02/19/2020 Nares  Final   02/16/2020 Blood Right Hand  Final   02/16/2020 Blood Left Hand  Final   02/15/2020 Blood Right Hand  Final   02/15/2020 Blood Left Hand  Final   02/14/2020 Blood Right Hand  Final   02/13/2020 Blood Left Hand  Final   02/13/2020 Blood Right Hand  Final   02/13/2020 Nasopharyngeal  Final   02/13/2020 Catheterized Urine  Final        Quantiferon testing   Recent Labs   Lab Test 11/05/20 09/21/20  1438 02/12/20  0440 02/12/20  0440 02/08/20  0408   TBRES  --   --   --  Negative Negative   LYMPH 13.5 14.7   < > 4.8 17.5    < > = values in this interval not displayed.     Virology:  Respiratory virus testing    Recent Labs   Lab Test 02/13/20  0334   INFZA Negative   INFZB Negative   IRSV Negative     Hepatitis B Testing     Recent Labs   Lab Test 02/12/20  0440 02/08/20  0408   AUSAB 0.69 1.99   HBCAB Nonreactive Nonreactive   HEPBANG Nonreactive Nonreactive      Hepatitis C Antibody   Date Value Ref Range Status   02/12/2020 Nonreactive NR^Nonreactive Final     Comment:     Assay performance characteristics have not been established for newborns,   infants, and children     02/08/2020 Nonreactive NR^Nonreactive Final     Comment:     Assay performance characteristics have not been established  for newborns,   infants, and children         CMV Antibody IgG   Date Value Ref Range Status   02/12/2020 >8.0 (H) 0.0 - 0.8 AI Final     Comment:     Positive  Antibody index (AI) values reflect qualitative changes in antibody   concentration that cannot be directly associated with clinical condition or   disease state.     02/08/2020 7.8 (H) 0.0 - 0.8 AI Final     Comment:     Positive  Antibody index (AI) values reflect qualitative changes in antibody   concentration that cannot be directly associated with clinical condition or   disease state.       Varicella Zoster Virus Antibody IgG   Date Value Ref Range Status   02/12/2020 2.0 (H) 0.0 - 0.8 AI Final     Comment:     Positive, suggests prev. exposure and probable immunity  Antibody index (AI) values reflect qualitative changes in antibody   concentration that cannot be directly associated with clinical condition or   disease state.     02/08/2020 2.4 (H) 0.0 - 0.8 AI Final     Comment:     Positive, suggests prev. exposure and probable immunity  Antibody index (AI) values reflect qualitative changes in antibody   concentration that cannot be directly associated with clinical condition or   disease state.       Toxoplasma Antibody IgG   Date Value Ref Range Status   02/12/2020 <3.0 0.0 - 7.1 IU/mL Final     Comment:     Negative- Absence of detectable Toxoplasma gondii IgG antibodies. A negative   result does not rule out acute infection.  The magnitude of the measured result is not indicative of the amount of   antibody present. The concentrations of anti-Toxoplasma gondii IgG in a given   specimen determined with assays from different manufacturers can vary due to   differences in assay methods and reagent specificity.     02/08/2020 <3.0 0.0 - 7.1 IU/mL Final     Comment:     Negative- Absence of detectable Toxoplasma gondii IgG antibodies. A negative   result does not rule out acute infection.  The magnitude of the measured result is not indicative of the  "amount of   antibody present. The concentrations of anti-Toxoplasma gondii IgG in a given   specimen determined with assays from different manufacturers can vary due to   differences in assay methods and reagent specificity.       Imaging:  Results for orders placed or performed in visit on 09/21/20   X-ray Chest 2 vws*    Narrative    EXAM: XR CHEST 2 VW  9/21/2020 2:32 PM     HISTORY:  SOB At rest; LVAD (left ventricular assist device) present  (H)       COMPARISON:  4/2/2020    FINDINGS:   Single lead cardiac pacer generator and LVAD appear intact.    The trachea is midline. The cardiomediastinal silhouette is enlarged  and well-defined. The pulmonary vasculature are distinct. No acute  airspace opacity, pleural effusion or appreciable pneumothorax.    No acute osseous abnormalities. The included soft tissues and upper  abdomen and are within normal limits.        Impression    IMPRESSION: No acute airspace opacity.     I have personally reviewed the examination and initial interpretation  and I agree with the findings.    QUIQUE NICHOLS MD         Eliseo Tanner is a 65 year old male who is being evaluated via a billable video visit.      The patient has been notified of following:     \"This video visit will be conducted via a call between you and your physician/provider. We have found that certain health care needs can be provided without the need for an in-person physical exam.  This service lets us provide the care you need with a video conversation.  If a prescription is necessary we can send it directly to your pharmacy.  If lab work is needed we can place an order for that and you can then stop by our lab to have the test done at a later time.    Video visits are billed at different rates depending on your insurance coverage.  Please reach out to your insurance provider with any questions.    If during the course of the call the physician/provider feels a video visit is not appropriate, you will not " "be charged for this service.\"    Patient has given verbal consent for Video visit? Yes  How would you like to obtain your AVS? Mail a copy  If you are dropped from the video visit, the video invite should be resent to: Send to e-mail at: [email or web address]  Will anyone else be joining your video visit? No        Video-Visit Details    Type of service:  Video Visit--converted to a telephone visit due to technical issues    Video Start Time: 8:05 am  Video End Time: 8:45 am    Originating Location (pt. Location): Other telephone at home    Distant Location (provider location):  [facility] [place]     Platform used for Video Visit: Other: amwell converted to telephone    Negar Bhatti DO      "

## 2020-11-12 NOTE — PROGRESS NOTES
ID from [facility] calls today to report that patient started on Bactrim on 11/9.  Writer calls and speaks with spouse.  Requested INR check tomorrow.  MD reports she might stop Bactrim tomorrow if labs off tomorrow. Spouse is agreeable to go into lab tomorrow for INR and additional blood work if needed.

## 2020-11-12 NOTE — Clinical Note
Please arrange follow up on 11/22 or 11/23 with Cedrick Leggett or another provider who works w/ LVADs

## 2020-11-12 NOTE — PROGRESS NOTES
"Eliseo Tanner is a 65 year old male who is being evaluated via a billable video visit.      The patient has been notified of following:     \"This video visit will be conducted via a call between you and your physician/provider. We have found that certain health care needs can be provided without the need for an in-person physical exam.  This service lets us provide the care you need with a video conversation.  If a prescription is necessary we can send it directly to your pharmacy.  If lab work is needed we can place an order for that and you can then stop by our lab to have the test done at a later time.    Video visits are billed at different rates depending on your insurance coverage.  Please reach out to your insurance provider with any questions.    If during the course of the call the physician/provider feels a video visit is not appropriate, you will not be charged for this service.\"    Patient has given verbal consent for Video visit? Yes  How would you like to obtain your AVS? Mail a copy  If you are dropped from the video visit, the video invite should be resent to: Send to e-mail at: [email or web address]  Will anyone else be joining your video visit? No        Video-Visit Details    Type of service:  Video Visit--converted to a telephone visit due to technical issues    Video Start Time: 8:05 am  Video End Time: 8:45 am    Originating Location (pt. Location): Other telephone at home    Distant Location (provider location):  [facility] [place]     Platform used for Video Visit: Other: amwell converted to telephone    Negar Bhatti DO        "

## 2020-11-12 NOTE — TELEPHONE ENCOUNTER
----- Message from Negar Bhatti DO sent at 11/12/2020  8:58 AM CST -----  Regarding: Labs and US    I placed orders for labs and US of LVAD site. He is going to get labs later today at the [facility]. I was wondering if we could fax these orders and get the US scheduled.     Sharon-he says he has a cough and that he had a chest x-ray done last week. I can't see the results. Do you have those results?

## 2020-11-12 NOTE — TELEPHONE ENCOUNTER
Called pt and LVM on his cell phone to let him know that he can get labs and US done at [facility] now that they have the orders. Also sent Hochy eto message.  Antonette Pittman RN

## 2020-11-12 NOTE — TELEPHONE ENCOUNTER
Faxed lab orders to [facility] lab at fax # [phone]. Requested that all results be faxed to us.    Faxed order for LVAD US to [facility] imaging at fax # [phone]. Requested that images be pushed to  PACS and report faxed to us. They will call me if unable to do an US of his LVAD site.     They will also try to push images to us in PACS of CXR from 11/6/2020 (has to first be pushed to [facility] and then to [place]) and fax me the read report.  Antonette Pittman RN

## 2020-11-13 ENCOUNTER — CARE COORDINATION (OUTPATIENT)
Dept: CARDIOLOGY | Facility: CLINIC | Age: 65
End: 2020-11-13

## 2020-11-13 ENCOUNTER — ANTICOAGULATION THERAPY VISIT (OUTPATIENT)
Dept: ANTICOAGULATION | Facility: CLINIC | Age: 65
End: 2020-11-13

## 2020-11-13 ENCOUNTER — TELEPHONE (OUTPATIENT)
Dept: INFECTIOUS DISEASES | Facility: CLINIC | Age: 65
End: 2020-11-13

## 2020-11-13 DIAGNOSIS — I50.22 CHRONIC SYSTOLIC CONGESTIVE HEART FAILURE (H): ICD-10-CM

## 2020-11-13 DIAGNOSIS — Z95.811 LVAD (LEFT VENTRICULAR ASSIST DEVICE) PRESENT (H): ICD-10-CM

## 2020-11-13 DIAGNOSIS — A09 DIARRHEA OF INFECTIOUS ORIGIN: Primary | ICD-10-CM

## 2020-11-13 NOTE — PROGRESS NOTES
Patient called to report he will not have an INR done today.  Patient reports he is having diarrhea since last night and doesn't want to leave the house.  Patient has been drinking a little bit of fluids but doesn't want to eat anything.  In light of patient being on bactrim and now diarrhea writer gave instructions to lower warfarin dose over the weekend.

## 2020-11-13 NOTE — PROGRESS NOTES
D: Lab results received. Discussed with Karolina.   I/A: Creatinine 2.2 (was 2.26 on 9/21/20). Potassium 4.5.  Pt reported that he lost 3 lbs since yesterday. Stated that SOB has not improved. Reported abdomen is distended and reports still having cough (pt reported usually no sputum but occasionally white sputum). Patient's voice sounds congested. Pt denied fever but reported he gets occasional chills. VAD numbers stable. Discussed with Karolina.  Per Karolina, instructed pt to take 60mg of lasix in the morning and 20mg of lasix in the evening through the weekend. Instructed pt to get BMP in a week.  Instructed pt to page VAD Coordinator on-call on Monday to report his weights and how he is feeling. Pt verbalized understanding of instructions. Encouraged pt to call VAD Coordinator on-call over the weekend if he feels unwell; pt verbalized understanding.   P: Pt will increase lasix dose over the weekend, will call on Monday to check in and will get BMP in a week.

## 2020-11-13 NOTE — LETTER
Patient Name: Eliseo Tanner   : 1955   Diagnosis/ICD-10: Heart Failure, unspecified I50.9; LVAD Z95.811   Requesting Physician: Dr. Nader Cisneros   Date of Request: 20   Date to Draw Approx 2020             **Please fax results to Gladys Mccarthy RN VAD Coord at [phone].                               Call [phone] with Questions    ORDER CODE TESTS NANCY.VOL.   X CBASIC Na, K, Cl, CO2, Crea, BUN, Glu, Ca GG 0.5-1    BHEPAT Alb, AlkP, ALT, AST, BBil, TP GG 0.5-1    BLIP Chol, Trig, HDL, LDL GG 1-2    CCOMP Na, K, Cl, CO2, Crea, BUN, Glu, Ca, Alb, AlkP, ALT, AST, Bbil, TP,  GG 0.6-1    HGP CBC & Platelet P 0.3-1    HGDP CBC, Differential & Platelet P 0.3-1    PLT Platelet  P 0.3-1    INR INR B 2.7    PTT PTT B 2.7    LD Lactate Dehydrogenase GG 0.3-1    PHGB Plasma Hemoglobin GG 2-3    Pre-Albumin Pre-Albumin RG 1.0                   Signed,      Nader Cisneros MD  Heart Failure, Mechanical Circulatory Support and Transplant Cardiology   of Medicine,  Division of Cardiology, [facility]

## 2020-11-13 NOTE — TELEPHONE ENCOUNTER
Called patient to ensure that he was getting INR level drawn today. He decided not to get his INR checked because he was having diarrhea. He has had frequent liquid stools-almost every hour. Has improved slightly throughout the day. Also has some associated abdominal pain. No fevers or chills. I instructed that he should get the INR drawn due to the interaction between tmp/smx and warfarin. In addition, the diarrhea could also impact the results of the INR. He will plan on going tomorrow. I faxed C diff and Enteric stool pathogen panel lab orders to [phone] at [facility].    Negar Bhatti

## 2020-11-14 ENCOUNTER — HOSPITAL ENCOUNTER (INPATIENT)
Facility: CLINIC | Age: 65
LOS: 5 days | Discharge: HOME | DRG: 314 | End: 2020-11-19
Attending: INTERNAL MEDICINE | Admitting: INTERNAL MEDICINE
Payer: MEDICARE

## 2020-11-14 ENCOUNTER — CARE COORDINATION (OUTPATIENT)
Dept: CARDIOLOGY | Facility: CLINIC | Age: 65
End: 2020-11-14

## 2020-11-14 ENCOUNTER — TELEPHONE (OUTPATIENT)
Dept: INFECTIOUS DISEASES | Facility: CLINIC | Age: 65
End: 2020-11-14

## 2020-11-14 ENCOUNTER — TELEPHONE (OUTPATIENT)
Dept: CARDIOLOGY | Facility: CLINIC | Age: 65
End: 2020-11-14

## 2020-11-14 DIAGNOSIS — I42.8 NONISCHEMIC CARDIOMYOPATHY (H): ICD-10-CM

## 2020-11-14 DIAGNOSIS — Z95.811 LVAD (LEFT VENTRICULAR ASSIST DEVICE) PRESENT (H): Primary | ICD-10-CM

## 2020-11-14 DIAGNOSIS — I48.3 TYPICAL ATRIAL FLUTTER (H): ICD-10-CM

## 2020-11-14 DIAGNOSIS — R39.198 VOIDING DIFFICULTY: ICD-10-CM

## 2020-11-14 DIAGNOSIS — R78.81 BACTEREMIA: Primary | ICD-10-CM

## 2020-11-14 DIAGNOSIS — I48.0 PAROXYSMAL ATRIAL FIBRILLATION (H): ICD-10-CM

## 2020-11-14 DIAGNOSIS — Z95.811 LVAD (LEFT VENTRICULAR ASSIST DEVICE) PRESENT (H): ICD-10-CM

## 2020-11-14 DIAGNOSIS — K59.00 CONSTIPATION, UNSPECIFIED CONSTIPATION TYPE: ICD-10-CM

## 2020-11-14 LAB
ALBUMIN SERPL-MCNC: 2.6 G/DL (ref 3.4–5)
ALP SERPL-CCNC: 184 U/L (ref 40–150)
ALT SERPL W P-5'-P-CCNC: 56 U/L (ref 0–70)
ANION GAP SERPL CALCULATED.3IONS-SCNC: 7 MMOL/L (ref 3–14)
AST SERPL W P-5'-P-CCNC: 48 U/L (ref 0–45)
BILIRUB DIRECT SERPL-MCNC: 0.4 MG/DL (ref 0–0.2)
BILIRUB SERPL-MCNC: 0.7 MG/DL (ref 0.2–1.3)
BUN SERPL-MCNC: 35 MG/DL (ref 7–30)
CALCIUM SERPL-MCNC: 8.8 MG/DL (ref 8.5–10.1)
CHLORIDE SERPL-SCNC: 102 MMOL/L (ref 94–109)
CO2 SERPL-SCNC: 25 MMOL/L (ref 20–32)
CREAT SERPL-MCNC: 1.98 MG/DL (ref 0.66–1.25)
ERYTHROCYTE [DISTWIDTH] IN BLOOD BY AUTOMATED COUNT: 20.7 % (ref 10–15)
GFR SERPL CREATININE-BSD FRML MDRD: 34 ML/MIN/{1.73_M2}
GLUCOSE BLDC GLUCOMTR-MCNC: 190 MG/DL (ref 70–99)
GLUCOSE SERPL-MCNC: 194 MG/DL (ref 70–99)
HCT VFR BLD AUTO: 35.7 % (ref 40–53)
HGB BLD-MCNC: 10.5 G/DL (ref 13.3–17.7)
INR PPP: 6.21 (ref 0.86–1.14)
INR PPP: 8.2 (ref 0.9–1.1)
MCH RBC QN AUTO: 20.2 PG (ref 26.5–33)
MCHC RBC AUTO-ENTMCNC: 29.4 G/DL (ref 31.5–36.5)
MCV RBC AUTO: 69 FL (ref 78–100)
PLATELET # BLD AUTO: 430 10E9/L (ref 150–450)
POTASSIUM SERPL-SCNC: 4.9 MMOL/L (ref 3.4–5.3)
PROT SERPL-MCNC: 7.8 G/DL (ref 6.8–8.8)
RBC # BLD AUTO: 5.21 10E12/L (ref 4.4–5.9)
SODIUM SERPL-SCNC: 134 MMOL/L (ref 133–144)
WBC # BLD AUTO: 18.4 10E9/L (ref 4–11)

## 2020-11-14 PROCEDURE — 250N000013 HC RX MED GY IP 250 OP 250 PS 637: Performed by: STUDENT IN AN ORGANIZED HEALTH CARE EDUCATION/TRAINING PROGRAM

## 2020-11-14 PROCEDURE — 250N000011 HC RX IP 250 OP 636: Performed by: STUDENT IN AN ORGANIZED HEALTH CARE EDUCATION/TRAINING PROGRAM

## 2020-11-14 PROCEDURE — 80048 BASIC METABOLIC PNL TOTAL CA: CPT | Performed by: STUDENT IN AN ORGANIZED HEALTH CARE EDUCATION/TRAINING PROGRAM

## 2020-11-14 PROCEDURE — 85027 COMPLETE CBC AUTOMATED: CPT | Performed by: STUDENT IN AN ORGANIZED HEALTH CARE EDUCATION/TRAINING PROGRAM

## 2020-11-14 PROCEDURE — 999N000128 HC STATISTIC PERIPHERAL IV START W/O US GUIDANCE

## 2020-11-14 PROCEDURE — U0003 INFECTIOUS AGENT DETECTION BY NUCLEIC ACID (DNA OR RNA); SEVERE ACUTE RESPIRATORY SYNDROME CORONAVIRUS 2 (SARS-COV-2) (CORONAVIRUS DISEASE [COVID-19]), AMPLIFIED PROBE TECHNIQUE, MAKING USE OF HIGH THROUGHPUT TECHNOLOGIES AS DESCRIBED BY CMS-2020-01-R: HCPCS | Performed by: STUDENT IN AN ORGANIZED HEALTH CARE EDUCATION/TRAINING PROGRAM

## 2020-11-14 PROCEDURE — 87800 DETECT AGNT MULT DNA DIREC: CPT | Performed by: STUDENT IN AN ORGANIZED HEALTH CARE EDUCATION/TRAINING PROGRAM

## 2020-11-14 PROCEDURE — 214N000001 HC R&B CCU UMMC

## 2020-11-14 PROCEDURE — 80076 HEPATIC FUNCTION PANEL: CPT | Performed by: STUDENT IN AN ORGANIZED HEALTH CARE EDUCATION/TRAINING PROGRAM

## 2020-11-14 PROCEDURE — 36415 COLL VENOUS BLD VENIPUNCTURE: CPT | Performed by: STUDENT IN AN ORGANIZED HEALTH CARE EDUCATION/TRAINING PROGRAM

## 2020-11-14 PROCEDURE — 87040 BLOOD CULTURE FOR BACTERIA: CPT | Performed by: STUDENT IN AN ORGANIZED HEALTH CARE EDUCATION/TRAINING PROGRAM

## 2020-11-14 PROCEDURE — 85610 PROTHROMBIN TIME: CPT | Performed by: STUDENT IN AN ORGANIZED HEALTH CARE EDUCATION/TRAINING PROGRAM

## 2020-11-14 PROCEDURE — 250N000012 HC RX MED GY IP 250 OP 636 PS 637: Performed by: STUDENT IN AN ORGANIZED HEALTH CARE EDUCATION/TRAINING PROGRAM

## 2020-11-14 PROCEDURE — 999N001017 HC STATISTIC GLUCOSE BY METER IP

## 2020-11-14 PROCEDURE — 87186 SC STD MICRODIL/AGAR DIL: CPT | Performed by: STUDENT IN AN ORGANIZED HEALTH CARE EDUCATION/TRAINING PROGRAM

## 2020-11-14 PROCEDURE — 87077 CULTURE AEROBIC IDENTIFY: CPT | Performed by: STUDENT IN AN ORGANIZED HEALTH CARE EDUCATION/TRAINING PROGRAM

## 2020-11-14 RX ORDER — AMIODARONE HYDROCHLORIDE 200 MG/1
200 TABLET ORAL DAILY
Status: DISCONTINUED | OUTPATIENT
Start: 2020-11-15 | End: 2020-11-19 | Stop reason: HOSPADM

## 2020-11-14 RX ORDER — NITROGLYCERIN 0.4 MG/1
0.4 TABLET SUBLINGUAL EVERY 5 MIN PRN
Status: DISCONTINUED | OUTPATIENT
Start: 2020-11-14 | End: 2020-11-19 | Stop reason: HOSPADM

## 2020-11-14 RX ORDER — FUROSEMIDE 40 MG
40 TABLET ORAL DAILY
Status: DISCONTINUED | OUTPATIENT
Start: 2020-11-15 | End: 2020-11-15

## 2020-11-14 RX ORDER — METHOCARBAMOL 500 MG/1
500 TABLET, FILM COATED ORAL 4 TIMES DAILY PRN
Status: DISCONTINUED | OUTPATIENT
Start: 2020-11-14 | End: 2020-11-19 | Stop reason: HOSPADM

## 2020-11-14 RX ORDER — MECLIZINE HCL 12.5 MG 12.5 MG/1
12.5 TABLET ORAL 3 TIMES DAILY PRN
Status: DISCONTINUED | OUTPATIENT
Start: 2020-11-14 | End: 2020-11-19 | Stop reason: HOSPADM

## 2020-11-14 RX ORDER — ALLOPURINOL 100 MG/1
200 TABLET ORAL DAILY
Status: DISCONTINUED | OUTPATIENT
Start: 2020-11-15 | End: 2020-11-19 | Stop reason: HOSPADM

## 2020-11-14 RX ORDER — ZOLPIDEM TARTRATE 5 MG/1
5 TABLET ORAL
Status: DISCONTINUED | OUTPATIENT
Start: 2020-11-14 | End: 2020-11-19 | Stop reason: HOSPADM

## 2020-11-14 RX ORDER — ONDANSETRON 4 MG/1
4 TABLET, FILM COATED ORAL EVERY 4 HOURS PRN
Status: DISCONTINUED | OUTPATIENT
Start: 2020-11-14 | End: 2020-11-19 | Stop reason: HOSPADM

## 2020-11-14 RX ORDER — LISINOPRIL 10 MG/1
10 TABLET ORAL DAILY
Status: DISCONTINUED | OUTPATIENT
Start: 2020-11-15 | End: 2020-11-19 | Stop reason: HOSPADM

## 2020-11-14 RX ORDER — HYDRALAZINE HYDROCHLORIDE 100 MG/1
100 TABLET, FILM COATED ORAL 3 TIMES DAILY
Status: DISCONTINUED | OUTPATIENT
Start: 2020-11-14 | End: 2020-11-15

## 2020-11-14 RX ORDER — NICOTINE POLACRILEX 4 MG
15-30 LOZENGE BUCCAL
Status: DISCONTINUED | OUTPATIENT
Start: 2020-11-14 | End: 2020-11-19 | Stop reason: HOSPADM

## 2020-11-14 RX ORDER — AMOXICILLIN 250 MG
2 CAPSULE ORAL 2 TIMES DAILY
Status: DISCONTINUED | OUTPATIENT
Start: 2020-11-14 | End: 2020-11-19 | Stop reason: HOSPADM

## 2020-11-14 RX ORDER — CEFAZOLIN SODIUM 2 G/100ML
2 INJECTION, SOLUTION INTRAVENOUS EVERY 12 HOURS
Status: DISCONTINUED | OUTPATIENT
Start: 2020-11-14 | End: 2020-11-15

## 2020-11-14 RX ORDER — MULTIPLE VITAMINS W/ MINERALS TAB 9MG-400MCG
1 TAB ORAL DAILY
Status: DISCONTINUED | OUTPATIENT
Start: 2020-11-15 | End: 2020-11-19 | Stop reason: HOSPADM

## 2020-11-14 RX ORDER — ASPIRIN 81 MG/1
81 TABLET, CHEWABLE ORAL DAILY
Status: DISCONTINUED | OUTPATIENT
Start: 2020-11-15 | End: 2020-11-19 | Stop reason: HOSPADM

## 2020-11-14 RX ORDER — DEXTROSE MONOHYDRATE 25 G/50ML
25-50 INJECTION, SOLUTION INTRAVENOUS
Status: DISCONTINUED | OUTPATIENT
Start: 2020-11-14 | End: 2020-11-19 | Stop reason: HOSPADM

## 2020-11-14 RX ORDER — ACETAMINOPHEN 325 MG/1
650 TABLET ORAL EVERY 4 HOURS PRN
Status: DISCONTINUED | OUTPATIENT
Start: 2020-11-14 | End: 2020-11-14

## 2020-11-14 RX ORDER — MAGNESIUM HYDROXIDE/ALUMINUM HYDROXICE/SIMETHICONE 120; 1200; 1200 MG/30ML; MG/30ML; MG/30ML
30 SUSPENSION ORAL EVERY 4 HOURS PRN
Status: DISCONTINUED | OUTPATIENT
Start: 2020-11-14 | End: 2020-11-19 | Stop reason: HOSPADM

## 2020-11-14 RX ORDER — LIDOCAINE 40 MG/G
CREAM TOPICAL
Status: DISCONTINUED | OUTPATIENT
Start: 2020-11-14 | End: 2020-11-19 | Stop reason: HOSPADM

## 2020-11-14 RX ORDER — POLYETHYLENE GLYCOL 3350 17 G/17G
17 POWDER, FOR SOLUTION ORAL DAILY
Status: DISCONTINUED | OUTPATIENT
Start: 2020-11-15 | End: 2020-11-19 | Stop reason: HOSPADM

## 2020-11-14 RX ORDER — AMOXICILLIN 250 MG
1 CAPSULE ORAL 2 TIMES DAILY
Status: DISCONTINUED | OUTPATIENT
Start: 2020-11-14 | End: 2020-11-19 | Stop reason: HOSPADM

## 2020-11-14 RX ORDER — ACETAMINOPHEN 325 MG/1
650 TABLET ORAL EVERY 4 HOURS PRN
Status: DISCONTINUED | OUTPATIENT
Start: 2020-11-14 | End: 2020-11-19 | Stop reason: HOSPADM

## 2020-11-14 RX ORDER — ALBUTEROL SULFATE 90 UG/1
2 AEROSOL, METERED RESPIRATORY (INHALATION) EVERY 6 HOURS PRN
Status: DISCONTINUED | OUTPATIENT
Start: 2020-11-14 | End: 2020-11-19 | Stop reason: HOSPADM

## 2020-11-14 RX ORDER — MAGNESIUM OXIDE 400 MG/1
400 TABLET ORAL DAILY
Status: DISCONTINUED | OUTPATIENT
Start: 2020-11-15 | End: 2020-11-19 | Stop reason: HOSPADM

## 2020-11-14 RX ADMIN — HYDRALAZINE HYDROCHLORIDE 100 MG: 100 TABLET, FILM COATED ORAL at 21:32

## 2020-11-14 RX ADMIN — INSULIN GLARGINE 10 UNITS: 100 INJECTION, SOLUTION SUBCUTANEOUS at 22:37

## 2020-11-14 RX ADMIN — ZOLPIDEM TARTRATE 5 MG: 5 TABLET ORAL at 23:32

## 2020-11-14 RX ADMIN — CEFAZOLIN SODIUM 2 G: 2 INJECTION, SOLUTION INTRAVENOUS at 22:36

## 2020-11-14 NOTE — PROGRESS NOTES
Dr. Bhatti called about Eliseo Tanner. 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 He started having diarrhea this week and then his INR was 8.2 this morning. The blood cultures from earlier this week have grown GPC in clusters. Dr Bhatti is recommending that Eliseo be admitted to the hospital for IV abx.    Dr. Cisneros notified.

## 2020-11-14 NOTE — TELEPHONE ENCOUNTER
Paged for critical INR value. Discussed with patient's wife Veronique Tanner. He has been very weak, but otherwise no acute changes. No blood in stool, not coughing up blood. They will hold the warfarin and plan to recheck INR on Monday.     Daniel Fleming MD

## 2020-11-14 NOTE — TELEPHONE ENCOUNTER
I was called this morning for a preliminary result of a blood culture growing GPC in his blood culture. The technician reported that there was only 1 blood culture obtained and she was unclear if it was the aerobic or anaerobic bottle. In addition, his INR was high at 8.2. Based on these results and my discussion with him last night  I think he should be admitted for further evaluation. Last night he was endorsing increased diarrhea and low appetite. I had ordered a C diff and Enteric Pathogen panel which were going to obtained as an outpatient. I spoke with Zhao Nielsen (VAD) coordinator who is helping to coordinate this after he speaks with cardiologist.    Once he arrives:   -please stop the tmp/smx and start cefazolin 2 grams IV q 12 hours (crcl ~ 30) (cullture from exit drive line site was MSSA)  -repeat blood cultures x 2  -obtain C diff and enteric stool pathogen panel  -Please alert ID upon admission    Negar Bhatti  Transplant Infectious Diseases

## 2020-11-14 NOTE — PROGRESS NOTES
I called Eliseo back to tell him Dr. Cisneros would like to admit him to 6C for further assessment and probable IV ABX for his driveline infection. Patient placement said they have a bed for him on [facility]. I spoke with his wife Veronique. She said she would drive Eliseo to [place] and have him there by 830pm. Eliseo's INR today is 8.9 per Dr. Bhatti in ID. Eliseo reports no bleeding or headaches. I advised them to be careful to avoid bleeding with a high INR.    15

## 2020-11-15 ENCOUNTER — APPOINTMENT (OUTPATIENT)
Dept: CT IMAGING | Facility: CLINIC | Age: 65
DRG: 314 | End: 2020-11-15
Attending: STUDENT IN AN ORGANIZED HEALTH CARE EDUCATION/TRAINING PROGRAM
Payer: MEDICARE

## 2020-11-15 ENCOUNTER — APPOINTMENT (OUTPATIENT)
Dept: ULTRASOUND IMAGING | Facility: CLINIC | Age: 65
DRG: 314 | End: 2020-11-15
Attending: INTERNAL MEDICINE
Payer: MEDICARE

## 2020-11-15 LAB
ANION GAP SERPL CALCULATED.3IONS-SCNC: 10 MMOL/L (ref 3–14)
BASOPHILS # BLD AUTO: 0 10E9/L (ref 0–0.2)
BASOPHILS NFR BLD AUTO: 0.2 %
BUN SERPL-MCNC: 31 MG/DL (ref 7–30)
C DIFF TOX B STL QL: NEGATIVE
CALCIUM SERPL-MCNC: 8.5 MG/DL (ref 8.5–10.1)
CHLORIDE SERPL-SCNC: 102 MMOL/L (ref 94–109)
CO2 SERPL-SCNC: 21 MMOL/L (ref 20–32)
CREAT SERPL-MCNC: 1.78 MG/DL (ref 0.66–1.25)
DIFFERENTIAL METHOD BLD: ABNORMAL
EOSINOPHIL # BLD AUTO: 0 10E9/L (ref 0–0.7)
EOSINOPHIL NFR BLD AUTO: 0.1 %
ERYTHROCYTE [DISTWIDTH] IN BLOOD BY AUTOMATED COUNT: 20.2 % (ref 10–15)
GFR SERPL CREATININE-BSD FRML MDRD: 39 ML/MIN/{1.73_M2}
GLUCOSE BLDC GLUCOMTR-MCNC: 122 MG/DL (ref 70–99)
GLUCOSE BLDC GLUCOMTR-MCNC: 124 MG/DL (ref 70–99)
GLUCOSE BLDC GLUCOMTR-MCNC: 154 MG/DL (ref 70–99)
GLUCOSE BLDC GLUCOMTR-MCNC: 159 MG/DL (ref 70–99)
GLUCOSE BLDC GLUCOMTR-MCNC: 161 MG/DL (ref 70–99)
GLUCOSE SERPL-MCNC: 143 MG/DL (ref 70–99)
HCT VFR BLD AUTO: 33.6 % (ref 40–53)
HGB BLD-MCNC: 10 G/DL (ref 13.3–17.7)
IMM GRANULOCYTES # BLD: 0.4 10E9/L (ref 0–0.4)
IMM GRANULOCYTES NFR BLD: 1.9 %
INR PPP: 6.13 (ref 0.86–1.14)
LABORATORY COMMENT REPORT: NORMAL
LACTATE BLD-SCNC: 1.8 MMOL/L (ref 0.7–2)
LDH SERPL L TO P-CCNC: 219 U/L (ref 85–227)
LYMPHOCYTES # BLD AUTO: 0.8 10E9/L (ref 0.8–5.3)
LYMPHOCYTES NFR BLD AUTO: 4 %
MAGNESIUM SERPL-MCNC: 2 MG/DL (ref 1.6–2.3)
MCH RBC QN AUTO: 20.2 PG (ref 26.5–33)
MCHC RBC AUTO-ENTMCNC: 29.8 G/DL (ref 31.5–36.5)
MCV RBC AUTO: 68 FL (ref 78–100)
MONOCYTES # BLD AUTO: 1.2 10E9/L (ref 0–1.3)
MONOCYTES NFR BLD AUTO: 6.2 %
NEUTROPHILS # BLD AUTO: 17.4 10E9/L (ref 1.6–8.3)
NEUTROPHILS NFR BLD AUTO: 87.6 %
NRBC # BLD AUTO: 0 10*3/UL
NRBC BLD AUTO-RTO: 0 /100
PLATELET # BLD AUTO: 418 10E9/L (ref 150–450)
POTASSIUM SERPL-SCNC: 4.4 MMOL/L (ref 3.4–5.3)
RBC # BLD AUTO: 4.94 10E12/L (ref 4.4–5.9)
SARS-COV-2 RNA SPEC QL NAA+PROBE: NEGATIVE
SARS-COV-2 RNA SPEC QL NAA+PROBE: NORMAL
SODIUM SERPL-SCNC: 133 MMOL/L (ref 133–144)
SPECIMEN SOURCE: NORMAL
WBC # BLD AUTO: 19.9 10E9/L (ref 4–11)

## 2020-11-15 PROCEDURE — 36415 COLL VENOUS BLD VENIPUNCTURE: CPT | Performed by: STUDENT IN AN ORGANIZED HEALTH CARE EDUCATION/TRAINING PROGRAM

## 2020-11-15 PROCEDURE — 250N000011 HC RX IP 250 OP 636: Performed by: INTERNAL MEDICINE

## 2020-11-15 PROCEDURE — 76700 US EXAM ABDOM COMPLETE: CPT | Mod: 26 | Performed by: RADIOLOGY

## 2020-11-15 PROCEDURE — 250N000013 HC RX MED GY IP 250 OP 250 PS 637: Performed by: STUDENT IN AN ORGANIZED HEALTH CARE EDUCATION/TRAINING PROGRAM

## 2020-11-15 PROCEDURE — 83615 LACTATE (LD) (LDH) ENZYME: CPT | Performed by: INTERNAL MEDICINE

## 2020-11-15 PROCEDURE — 85610 PROTHROMBIN TIME: CPT | Performed by: STUDENT IN AN ORGANIZED HEALTH CARE EDUCATION/TRAINING PROGRAM

## 2020-11-15 PROCEDURE — 99207 PR NO CHARGE LOS: CPT | Performed by: INTERNAL MEDICINE

## 2020-11-15 PROCEDURE — 87040 BLOOD CULTURE FOR BACTERIA: CPT | Performed by: STUDENT IN AN ORGANIZED HEALTH CARE EDUCATION/TRAINING PROGRAM

## 2020-11-15 PROCEDURE — 214N000001 HC R&B CCU UMMC

## 2020-11-15 PROCEDURE — 36415 COLL VENOUS BLD VENIPUNCTURE: CPT

## 2020-11-15 PROCEDURE — 85025 COMPLETE CBC W/AUTO DIFF WBC: CPT | Performed by: INTERNAL MEDICINE

## 2020-11-15 PROCEDURE — 87040 BLOOD CULTURE FOR BACTERIA: CPT

## 2020-11-15 PROCEDURE — 99223 1ST HOSP IP/OBS HIGH 75: CPT | Performed by: INTERNAL MEDICINE

## 2020-11-15 PROCEDURE — 76700 US EXAM ABDOM COMPLETE: CPT

## 2020-11-15 PROCEDURE — 250N000011 HC RX IP 250 OP 636: Performed by: STUDENT IN AN ORGANIZED HEALTH CARE EDUCATION/TRAINING PROGRAM

## 2020-11-15 PROCEDURE — 83605 ASSAY OF LACTIC ACID: CPT | Performed by: INTERNAL MEDICINE

## 2020-11-15 PROCEDURE — 83735 ASSAY OF MAGNESIUM: CPT | Performed by: STUDENT IN AN ORGANIZED HEALTH CARE EDUCATION/TRAINING PROGRAM

## 2020-11-15 PROCEDURE — 74176 CT ABD & PELVIS W/O CONTRAST: CPT

## 2020-11-15 PROCEDURE — 36415 COLL VENOUS BLD VENIPUNCTURE: CPT | Performed by: INTERNAL MEDICINE

## 2020-11-15 PROCEDURE — 87493 C DIFF AMPLIFIED PROBE: CPT | Performed by: STUDENT IN AN ORGANIZED HEALTH CARE EDUCATION/TRAINING PROGRAM

## 2020-11-15 PROCEDURE — 250N000012 HC RX MED GY IP 250 OP 636 PS 637: Performed by: STUDENT IN AN ORGANIZED HEALTH CARE EDUCATION/TRAINING PROGRAM

## 2020-11-15 PROCEDURE — 999N001017 HC STATISTIC GLUCOSE BY METER IP

## 2020-11-15 PROCEDURE — 99223 1ST HOSP IP/OBS HIGH 75: CPT | Mod: 25 | Performed by: INTERNAL MEDICINE

## 2020-11-15 PROCEDURE — 93750 INTERROGATION VAD IN PERSON: CPT | Performed by: INTERNAL MEDICINE

## 2020-11-15 PROCEDURE — 74176 CT ABD & PELVIS W/O CONTRAST: CPT | Mod: 26 | Performed by: RADIOLOGY

## 2020-11-15 PROCEDURE — 80048 BASIC METABOLIC PNL TOTAL CA: CPT | Performed by: STUDENT IN AN ORGANIZED HEALTH CARE EDUCATION/TRAINING PROGRAM

## 2020-11-15 RX ORDER — ATORVASTATIN CALCIUM 20 MG/1
20 TABLET, FILM COATED ORAL DAILY
Status: ON HOLD | COMMUNITY
End: 2021-03-17

## 2020-11-15 RX ORDER — INSULIN GLARGINE 100 [IU]/ML
10 INJECTION, SOLUTION SUBCUTANEOUS AT BEDTIME
Status: ON HOLD | COMMUNITY
End: 2022-01-01

## 2020-11-15 RX ORDER — HYDRALAZINE HYDROCHLORIDE 100 MG/1
100 TABLET, FILM COATED ORAL 4 TIMES DAILY
Status: DISCONTINUED | OUTPATIENT
Start: 2020-11-15 | End: 2020-11-15

## 2020-11-15 RX ORDER — POTASSIUM CHLORIDE 1500 MG/1
20 TABLET, EXTENDED RELEASE ORAL DAILY
COMMUNITY
End: 2021-02-08

## 2020-11-15 RX ORDER — CEFAZOLIN SODIUM 2 G/100ML
2 INJECTION, SOLUTION INTRAVENOUS EVERY 8 HOURS
Status: DISCONTINUED | OUTPATIENT
Start: 2020-11-15 | End: 2020-11-19 | Stop reason: HOSPADM

## 2020-11-15 RX ORDER — HYDRALAZINE HYDROCHLORIDE 100 MG/1
100 TABLET, FILM COATED ORAL 3 TIMES DAILY
Status: DISCONTINUED | OUTPATIENT
Start: 2020-11-15 | End: 2020-11-19 | Stop reason: HOSPADM

## 2020-11-15 RX ORDER — WARFARIN SODIUM 4 MG/1
4 TABLET ORAL DAILY
Status: ON HOLD | COMMUNITY
End: 2020-11-17

## 2020-11-15 RX ORDER — BUMETANIDE 2 MG/1
2 TABLET ORAL
Status: DISCONTINUED | OUTPATIENT
Start: 2020-11-15 | End: 2020-11-19 | Stop reason: HOSPADM

## 2020-11-15 RX ADMIN — HYDRALAZINE HYDROCHLORIDE 100 MG: 100 TABLET, FILM COATED ORAL at 08:54

## 2020-11-15 RX ADMIN — AMIODARONE HYDROCHLORIDE 200 MG: 200 TABLET ORAL at 08:56

## 2020-11-15 RX ADMIN — INSULIN GLARGINE 10 UNITS: 100 INJECTION, SOLUTION SUBCUTANEOUS at 22:10

## 2020-11-15 RX ADMIN — ASPIRIN 81 MG: 81 TABLET, DELAYED RELEASE ORAL at 08:56

## 2020-11-15 RX ADMIN — MAGNESIUM OXIDE 400 MG: 400 TABLET ORAL at 08:56

## 2020-11-15 RX ADMIN — Medication 1 MG: at 15:50

## 2020-11-15 RX ADMIN — HYDRALAZINE HYDROCHLORIDE 100 MG: 100 TABLET, FILM COATED ORAL at 15:50

## 2020-11-15 RX ADMIN — FLUOXETINE 20 MG: 20 CAPSULE ORAL at 08:56

## 2020-11-15 RX ADMIN — CEFAZOLIN SODIUM 2 G: 2 INJECTION, SOLUTION INTRAVENOUS at 09:37

## 2020-11-15 RX ADMIN — CEFAZOLIN SODIUM 2 G: 2 INJECTION, SOLUTION INTRAVENOUS at 18:11

## 2020-11-15 RX ADMIN — INSULIN ASPART 1 UNITS: 100 INJECTION, SOLUTION INTRAVENOUS; SUBCUTANEOUS at 14:05

## 2020-11-15 RX ADMIN — LISINOPRIL 10 MG: 10 TABLET ORAL at 08:56

## 2020-11-15 RX ADMIN — ZOLPIDEM TARTRATE 5 MG: 5 TABLET ORAL at 20:37

## 2020-11-15 RX ADMIN — MULTIPLE VITAMINS W/ MINERALS TAB 1 TABLET: TAB at 08:55

## 2020-11-15 RX ADMIN — BUMETANIDE 2 MG: 2 TABLET ORAL at 14:01

## 2020-11-15 RX ADMIN — ALLOPURINOL 200 MG: 100 TABLET ORAL at 08:56

## 2020-11-15 RX ADMIN — BUMETANIDE 2 MG: 2 TABLET ORAL at 09:32

## 2020-11-15 RX ADMIN — HYDRALAZINE HYDROCHLORIDE 100 MG: 100 TABLET, FILM COATED ORAL at 20:32

## 2020-11-15 ASSESSMENT — ACTIVITIES OF DAILY LIVING (ADL)
ADLS_ACUITY_SCORE: 14
ADLS_ACUITY_SCORE: 16

## 2020-11-15 NOTE — PLAN OF CARE
Lab called with a positive BC from left hand.  Gram positive cocci in clusters reported as staph aureus. Drawn 11-14 2042.  CN and Cards 2 notified.

## 2020-11-15 NOTE — CONSULTS
[facility]  Transplant Infectious Disease Consult Note - New Patient     Patient:  Eliseo Tanner, Date of birth 1955, Medical record number [id number]  Date of Visit:  11/15/2020  Consult requested by Dr. Nader Cisneros for evaluation of MSSA Bacteremia         Assessment and Recommendations:   Assessment:  63 y/o male with chronic systolic HF and NICM s/p HM 3 on 2/18/2020 c/b polymicrobial bacteremia (P mirabilis and E faecalis), ABHINAV on CPAP, type II DM, CKD stage III, MSSA exit drive line infection who has developed MSSA bacteremia.      1. Complicated MSSA bacteremia w/ associated LVAD drive line infection: developed pain around exit site followed by drainage~ 11/1. Initially started on doxycycline 11/5 but switched to tmp/smx when the culture revealed MSSA resistant to doxy-subsequently switched to tmp/smx 1 S.S. BID. Blood cultures were initially negative but then on recheck 11/12 Staph aureus growth was observed. Susceptibilities pending but presume this will be MSSA since that is what his wound culture grew.  Abdominal US count not visualize an abscess. Given the MSSA will need to complete a full evaluation especially given his LVAD and ICD. Repeat blood cultures are pending. Of note does have bilateral TKA but currently there is no erythema or swelling over these areas.      2. Diarrhea: overall improved.     Historical Infectious Diseases Issues:  2/2020 P mirabilis and E faecalis bacteremia post VAD placement    Recommendations:  1. Blood cultures at [facility] are growing Staph aureus-susceptibilities pending (#[phone])  2. Repeat blood cultures are pending  3. Please obtain a CT abd/pelvis-contrast will provide better visualization but understand that he has reduced creatinine clearance so if needed w/o contrast is okay  4. Obtain a TTE-he does have a ICD in place so may need to consider a CHAMP  5. Continue cefazolin 2 grams IV q 12 hours    Transplant  Infectious Disease will continue to follow with you.    Negar Bhatti DO   Pager [phone]         History of Infectious Disease Illness:   HPI:  Mr. Tanner is a patient I saw in the outpatient clinic on 11/12. HPI partially obtained from this note.       65 y/o male with chronic systolic HF and NICM s/p HM 3 on 2/18/2020 and ICD 3/2020, ABHINAV on CPAP, type II DM, CKD stage III, MSSA LVAD exit site infection who has developed MSSA bacteremia.    Post LVAD placement he developed a retrosternal hematoma and bleeding as well as Proteus mirabilis and E faecalis bacteremia s/p 2 weeks of ampicillin and ceftriaxone. Also had an ICD placed on 3/16/2020 for secondary SCD prevention.Also follows with nephrology for renal disease and progressive creatinine increase.  On 11/4 patient called to report new redness, tenderness, bloody drainage x 5 days at drive line site  On 11/5 he was started on doxycycline due to concerns for infection. 11/15 culturegrew large amounts of MSSA (R: to doxycycline). Blood culture negative.   On 11/6 he went to the ED for SOB.   On 11/8 doxycyline was changed to tmp/smx 1 S.S. BID x 14 days  After my clinic visit on 11/12 I obtained repeat blood cultures which have down grown Staph aureus. Also found to have an elevated INR.     Since our visit on 11/12 he had 1 day of diarrhea which has improved. Denies fevers or chills. He thinks the drainage from the LVAD site has improved. No erythema.  He has a ICD in place and has not noticed increased erythema or swelling. Has chronic left shoulder pain but no new pains in his joints or back.     Transplants:  Coordinator Kay Padilla    Review of Systems: Remaining systems all reviewed and negative.  CONSTITUTIONAL:  No fevers or chills  EYES: negative for icterus or acute vision changes  ENT:  negative for acute hearing loss, tinnitus, sore throat  RESPIRATORY:  negative for cough, sputum or dyspnea  CARDIOVASCULAR:  negative for chest pain,  palpitations  GASTROINTESTINAL:  negative for nausea, vomiting, diarrhea or constipation  GENITOURINARY:  negative for dysuria or hematuria  HEME:  No easy bruising or bleeding  INTEGUMENT:  negative for rash or pruritus  NEURO:  Negative for headache or tremor      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)      LVAD (left ventricular assist device) present (H)        Past Surgical History:   Procedure Laterality Date     ANESTHESIA CARDIOVERSION N/A 2/28/2020    Procedure: ANESTHESIA, FOR CARDIOVERSION;  Surgeon: GENERIC ANESTHESIA PROVIDER;  Location: [facility] OR     CV CENTRAL VENOUS CATHETER PLACEMENT N/A 2/13/2020    Procedure: Central Venous Catheter Placement;  Surgeon: Chente Moss MD;  Location:  HEART CARDIAC CATH LAB     CV INTRA-AORTIC BALLOON PUMP INSERTION N/A 2/7/2020    Procedure: Intra-Aortic Balloon Pump Insertion;  Surgeon: Jose Baldwin MD;  Location:  HEART CARDIAC CATH LAB     CV INTRA-AORTIC BALLOON PUMP INSERTION N/A 2/13/2020    Procedure: Intra-Aortic Balloon Pump Insertion;  Surgeon: Chente Moss MD;  Location:  HEART CARDIAC CATH LAB     CV RIGHT HEART CATH N/A 9/21/2020    Procedure: CV RIGHT HEART CATH;  Surgeon: Dalton Baeza MD;  Location:  HEART CARDIAC CATH LAB     CV SWAN LUCIANA PROCEDURE N/A 2/13/2020    Procedure: [place] Luciana Procedure;  Surgeon: Chente Moss MD;  Location:  HEART CARDIAC CATH LAB     EP ICD Bilateral 3/16/2020    Procedure: EP ICD;  Surgeon: Dali Day MD;  Location:  HEART CARDIAC CATH LAB     INSERT VENTRICULAR ASSIST DEVICE LEFT (HEARTMATE II) N/A 2/18/2020    Procedure: INSERTION, LEFT VENTRICULAR ASSIST DEVICE (HEARTMATE III);  Surgeon: Mac Jaramillo MD;  Location:  OR      THORACOSCOPY Right 3/6/2020    Procedure: RIGHT VIDEO-ASSISTED THORASCOPIC SURGERY, EVACUATION OF HEMOTHORAX, PLACEMENT OF CHEST TUBES;  Surgeon: William Gan MD;  Location:  OR       History reviewed. No pertinent family history.    Social History     Social History Narrative     Not on file     Social History     Tobacco Use     Smoking status: Former Smoker     Quit date: 1994     Years since quittin.6     Smokeless tobacco: Never Used   Substance Use Topics     Alcohol use: Not on file     Drug use: Not on file       Immunization History   Administered Date(s) Administered     Flu, Unspecified 11/15/2019     Influenza (High Dose) 3 valent vaccine 10/25/2019     Pneumo Conj 13-V (2010&after) 2018       Patient Active Problem List   Diagnosis     Acute on chronic systolic congestive heart failure (H)     Anxiety     CKD (chronic kidney disease) stage 3, GFR 30-59 ml/min     Coronary artery disease involving native coronary artery of native heart without angina pectoris     GERD (gastroesophageal reflux disease)     Gout     Hyperlipidemia with target LDL less than 100     Nonischemic cardiomyopathy (H)     Non-nephrotic range proteinuria     ABHINAV on CPAP     Type 2 diabetes mellitus with stage 3 chronic kidney disease, without long-term current use of insulin (H)     Heart failure (H)     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)     Cardiac arrest (H)     LVAD (left ventricular assist device) present (H)     Chronic systolic congestive heart failure (H)     CKD (chronic kidney disease) stage 4, GFR 15-29 ml/min (H)     Bacteremia            Current Medications & Allergies:   Antibiotic History:    Prophylaxis:      allopurinol  200 mg Oral or Feeding Tube Daily     amiodarone  200 mg Oral Daily     aspirin  81 mg Oral Daily     bumetanide  2 mg Oral BID     ceFAZolin  2 g Intravenous Q8H     FLUoxetine  20 mg Oral Daily     hydrALAZINE  100 mg Oral 4x Daily     insulin aspart  1-7  "Units Subcutaneous TID AC     insulin aspart  1-5 Units Subcutaneous At Bedtime     insulin glargine  10 Units Subcutaneous At Bedtime     lisinopril  10 mg Oral Daily     magnesium oxide  400 mg Oral or Feeding Tube Daily     multivitamin w/minerals  1 tablet Oral Daily     polyethylene glycol  17 g Oral Daily     senna-docusate  1 tablet Oral BID    Or     senna-docusate  2 tablet Oral BID     sodium chloride (PF)  3 mL Intracatheter Q8H     warfarin-No DOSE today  1 each Does not apply no dose today (warfarin)       Infusions/Drips:      - MEDICATION INSTRUCTIONS -       - MEDICATION INSTRUCTIONS -       Warfarin Therapy Reminder         Allergies   Allergen Reactions     Heparin      HIT screen positive 2/14/20, reflex DAVINA negative; however heme recommended treating as if positive  HIT screen negative 2/11/20     Oxycodone Itching and Other (See Comments)     Chlorhexidine Rash            Physical Exam:     Patient Vitals for the past 24 hrs:   BP Temp Temp src Pulse Resp SpO2 Height Weight   11/15/20 1125 -- 97.4  F (36.3  C) Oral 111 18 95 % -- --   11/15/20 0825 101/87 98.4  F (36.9  C) Oral 112 18 95 % -- --   11/15/20 0300 124/84 99.6  F (37.6  C) Axillary 112 18 94 % -- --   11/14/20 2328 98/87 98.7  F (37.1  C) Oral 109 18 96 % -- --   11/14/20 2000 -- -- -- 112 -- -- 1.702 m (5' 7\") --   11/14/20 1948 (!) 115/90 97.9  F (36.6  C) Oral 110 18 97 % -- 99.8 kg (220 lb)     Ranges for vital signs:  Temp:  [97.4  F (36.3  C)-99.6  F (37.6  C)] 97.4  F (36.3  C)  Pulse:  [109-112] 111  Resp:  [18] 18  BP: ()/(84-90) 101/87  SpO2:  [94 %-97 %] 95 %  Vitals:    11/14/20 1948   Weight: 99.8 kg (220 lb)       Physical Examination:  GENERAL:  well-appearing, in no acute distress. In bed  HEAD:  Head is normocephalic, atraumatic   EYES:  Eyes have anicteric sclerae without conjunctival injection. Pupils reactive bilaterally.    ENT:  Oropharynx is moist without exudates or ulcers. Tongue is midline  NECK:  " Supple. No cervical lymphadenopathy  LUNGS:  Clear to auscultation bilaterally. No accessory muscle use. Not on oxygen.   CARDIOVASCULAR:  HM noises. Minimal LE edema.    ABDOMEN:  Normal bowel sounds, soft, non-tender, non-distended. No appreciable hepatosplenomegaly.  SKIN:  No acute rashes. LVAD dressing in place. ICD without swelling or erythema.   EXTREMITIES: no joint swelling or erythema  NEUROLOGIC:  Grossly nonfocal. Active x4 extremities. Strength equal.  LINES: PIV in place. No erythema around insertion site and dressing intact.          Laboratory Data:     Metabolic Studies       Recent Labs   Lab Test 11/15/20  0541 11/14/20  2041 09/21/20  1440 09/21/20  1440 03/06/20  1615 03/06/20  1615 03/06/20  0322 03/06/20  0322 02/13/20  0206 02/13/20  0206 02/08/20  0408 02/08/20  0408    134   < >  --    < > 137  --  135   < > 132*   < > 134   POTASSIUM 4.4 4.9   < >  --    < > 4.1  --  4.1   < > 4.1  4.2   < > 3.5   CHLORIDE 102 102   < >  --    < >  --   --  104   < > 96   < > 103   CO2 21 25   < >  --    < >  --   --  30   < > 22   < > 23   ANIONGAP 10 7   < >  --    < >  --   --  1*   < > 14   < > 8   BUN 31* 35*   < >  --    < >  --   --  16   < > 34*   < > 50*   CR 1.78* 1.98*   < >  --    < >  --   --  1.02   < > 1.58*   < > 2.81*   GFRESTIMATED 39* 34*   < >  --    < >  --   --  76   < > 45*   < > 22*   * 194*   < >  --    < > 123*  --  149*   < > 154*   < > 155*   A1C  --   --   --   --   --   --   --   --   --   --   --  7.3*   CALOS 8.5 8.8   < >  --    < >  --   --  8.0*   < > 8.9   < > 8.2*   PHOS  --   --   --   --   --   --   --  3.8   < > 5.4*   < > 5.0*   MAG 2.0  --   --   --    < >  --   --  2.2   < > 2.0   < >  --    LACT  --   --   --  1.3  --  0.7   < >  --    < > 9.5*   < > 0.6*   CKT  --   --   --   --   --   --   --   --   --  39  --   --     < > = values in this interval not displayed.       Hepatic Studies    Recent Labs   Lab Test 11/15/20  0830 11/14/20 2041 11/12/20  10/16/20   BILITOTAL  --  0.7 0.5 0.6   DBIL  --  0.4*  --  0.0   ALKPHOS  --  184* 202.0* 139   PROTTOTAL  --  7.8 7.6 8.4   ALBUMIN  --  2.6* 3.7 4.3   AST  --  48* 65.0* 70   ALT  --  56 64* 88     --   --  322       Hematology Studies      Recent Labs   Lab Test 11/15/20  0830 11/14/20 2041 11/05/20 09/21/20  1438 07/13/20 06/16/20   WBC 19.9* 18.4* 9.4 7.2 7.0 7.0   ANEU 17.4*  --   --  5.3  --   --    ALYM 0.8  --   --  1.1  --   --    GIULIANO 1.2  --   --  0.7  --   --    AEOS 0.0  --   --  0.1  --   --    HGB 10.0* 10.5* 10.1* 10.9* 10.9* 10.7*   HCT 33.6* 35.7* 33.7 36.8* 36 35.5    430 385 315 382 378       Arterial Blood Gas Testing    Recent Labs   Lab Test 03/15/20  2235 03/06/20  1615 03/06/20  1550 02/26/20  1321 02/24/20  0345 02/24/20  0345 02/23/20  1955   PH 7.53* 7.41 7.39  --   --  7.46* 7.47*   PCO2 35 42 45  --   --  42 37   PO2 126* 96 49*  --   --  123* 81   HCO3 29* 26 27  --   --  30* 27   O2PER 21 65 65 21.0   < > 2L  2L 21    < > = values in this interval not displayed.        Medication levels    Recent Labs   Lab Test 02/16/20 0400 02/13/20 2147 02/13/20  2147   VANCOMYCIN 18.5   < >  --    TOBRA  --   --  13.0    < > = values in this interval not displayed.     Inflammatory Markers    Recent Labs   Lab Test 11/05/20 09/21/20  1438   CRP 54.9 6.6     Pancreatitis testing    Recent Labs   Lab Test 02/08/20  0408   TRIG 57       Gout Labs      Recent Labs   Lab Test 02/08/20  0408   URIC 7.5*       Clotting Studies    Recent Labs   Lab Test 11/15/20  0541 11/14/20  2041 11/04/20 10/02/20 03/20/20  0530 03/20/20  0530   INR 6.13* 6.21* 2.4* 2.3*   < > 2.84*   PTT  --   --   --   --   --  59*    < > = values in this interval not displayed.       Iron Testing    Recent Labs   Lab Test 11/15/20  0830 02/08/20  0408 02/08/20  0408   IRON  --   --  25*   FEB  --   --  306   IRONSAT  --   --  8*   HEAVENLY  --   --  189   MCV 68*   < > 87   B12  --   --  752    < > = values in this  interval not displayed.       Thyroid Studies     Recent Labs   Lab Test 02/08/20  0408   TSH 1.72       Urine Studies     Recent Labs   Lab Test 02/22/20  0944 02/13/20  0334 02/07/20 2029   URINEPH 5.5 6.0 5.0   NITRITE Negative Negative Negative   LEUKEST Small* Small* Negative   WBCU 2 81* 3     Last Culture results with specimen source  Culture Micro   Date Value Ref Range Status   11/14/2020 No growth after 9 hours  Preliminary   11/14/2020 No growth after 9 hours  Preliminary   09/21/2020 No growth  Final   09/21/2020 No growth  Final   03/13/2020 No growth  Final   03/13/2020 No growth  Final   03/03/2020 No growth  Final   03/03/2020 No growth  Final   02/22/2020 No growth  Final   02/22/2020 No growth  Final   02/16/2020 No growth  Final   02/16/2020 No growth  Final   02/15/2020 No growth  Final   02/15/2020 (A)  Final    Cultured on the 2nd day of incubation:  Staphylococcus epidermidis     02/15/2020   Final    Critical Value/Significant Value, preliminary result only, called to and read back by  Cee Benavidez RN on 2.16.20 at 2220.  JRT     02/15/2020   Final    (Note)  POSITIVE for STAPHYLOCOCCUS EPIDERMIDIS and POSITIVE for the mecA  gene (resistant to methicillin) by Pawngoigene multiplex nucleic acid  test. Final identification and antimicrobial susceptibility testing  will be verified by standard methods.    Specimen tested with Verigene multiplex, gram-positive blood culture  nucleic acid test for the following targets: Staph aureus, Staph  epidermidis, Staph lugdunensis, other Staph species, Enterococcus  faecalis, Enterococcus faecium, Streptococcus species, S. agalactiae,  S. anginosus grp., S. pneumoniae, S. pyogenes, Listeria sp., mecA  (methicillin resistance) and Ernst/B (vancomycin resistance).    Critical Value/Significant Value called to and read back by Cee Benavidez RN, 2.17.20 @ 0207 pt.     02/14/2020 No growth  Final   02/13/2020 (A)  Final    Cultured on the 1st day of  incubation:  Proteus mirabilis  Susceptibility testing done on previous specimen     02/13/2020   Final    Critical Value/Significant Value, preliminary result only, called to and read back by  Mima Cabrera RN. @1426. 2.13.20. .      02/13/2020 (A)  Final    Cultured on the 1st day of incubation:  Enterococcus faecalis  Susceptibility testing done on previous specimen     02/13/2020   Final    Critical Value/Significant Value, preliminary result only, called to and read back by  Alisson Castillo RN on 2.13.20 at 1728.  JRT     02/13/2020   Final    (Note)  POSITIVE for ENTEROCOCCUS FAECALIS and NEGATIVE for Ernst/vanB genes  by Eduquiaigene multiplex nucleic acid test. Final identification and  antimicrobial susceptibility testing will be verified by standard  methods.    Specimen tested with Verigene multiplex, gram-positive blood culture  nucleic acid test for the following targets: Staph aureus, Staph  epidermidis, Staph lugdunensis, other Staph species, Enterococcus  faecalis, Enterococcus faecium, Streptococcus species, S. agalactiae,  S. anginosus grp., S. pneumoniae, S. pyogenes, Listeria sp., mecA  (methicillin resistance) and Ernst/B (vancomycin resistance).    Critical Value/Significant Value called to and read back by MARIA EUGENIA MILLAN RN 2/13/20 2036           Specimen Description   Date Value Ref Range Status   11/14/2020 Blood Left Hand  Final   11/14/2020 Blood Left Arm  Final   09/21/2020 Blood Left Arm  Final   09/21/2020 Blood Right Arm  Final   03/13/2020 Blood Left Hand  Final   03/13/2020 Blood Right Hand  Final   03/03/2020 Blood Left Hand  Final   03/03/2020 Blood Right Arm  Final   02/22/2020 Blood Right Hand  Final   02/22/2020 Blood Right Hand  Final   02/19/2020 Nares  Final   02/16/2020 Blood Right Hand  Final   02/16/2020 Blood Left Hand  Final   02/15/2020 Blood Right Hand  Final   02/15/2020 Blood Left Hand  Final   02/14/2020 Blood Right Hand  Final   02/13/2020 Blood Left Hand  Final    02/13/2020 Blood Right Hand  Final   02/13/2020 Nasopharyngeal  Final        Last check of C difficile  No results found for: CDBPCT    Syphilis Testing    Treponema Antibodies   Date Value Ref Range Status   02/12/2020 Nonreactive NR^Nonreactive Final       Quantiferon testing   Recent Labs   Lab Test 11/15/20  0830 11/05/20 02/12/20  0440 02/12/20  0440 02/08/20  0408   TBRES  --   --   --  Negative Negative   LYMPH 4.0 13.5   < > 4.8 17.5    < > = values in this interval not displayed.       Virology:  Respiratory virus testing    Recent Labs   Lab Test 11/14/20  2140 02/13/20  0334   INFZA  --  Negative   INFZB  --  Negative   IRSV  --  Negative   [id number] Nasopharyngeal  --    SARSCOVRES NEGATIVE  --      Hepatitis B Testing     Recent Labs   Lab Test 02/12/20  0440 02/08/20  0408   AUSAB 0.69 1.99   HBCAB Nonreactive Nonreactive   HEPBANG Nonreactive Nonreactive        Hepatitis C Antibody   Date Value Ref Range Status   02/12/2020 Nonreactive NR^Nonreactive Final     Comment:     Assay performance characteristics have not been established for newborns,   infants, and children     02/08/2020 Nonreactive NR^Nonreactive Final     Comment:     Assay performance characteristics have not been established for newborns,   infants, and children         CMV Antibody IgG   Date Value Ref Range Status   02/12/2020 >8.0 (H) 0.0 - 0.8 AI Final     Comment:     Positive  Antibody index (AI) values reflect qualitative changes in antibody   concentration that cannot be directly associated with clinical condition or   disease state.     02/08/2020 7.8 (H) 0.0 - 0.8 AI Final     Comment:     Positive  Antibody index (AI) values reflect qualitative changes in antibody   concentration that cannot be directly associated with clinical condition or   disease state.       Varicella Zoster Virus Antibody IgG   Date Value Ref Range Status   02/12/2020 2.0 (H) 0.0 - 0.8 AI Final     Comment:     Positive, suggests prev. exposure  and probable immunity  Antibody index (AI) values reflect qualitative changes in antibody   concentration that cannot be directly associated with clinical condition or   disease state.     02/08/2020 2.4 (H) 0.0 - 0.8 AI Final     Comment:     Positive, suggests prev. exposure and probable immunity  Antibody index (AI) values reflect qualitative changes in antibody   concentration that cannot be directly associated with clinical condition or   disease state.       Toxoplasma Antibody IgG   Date Value Ref Range Status   02/12/2020 <3.0 0.0 - 7.1 IU/mL Final     Comment:     Negative- Absence of detectable Toxoplasma gondii IgG antibodies. A negative   result does not rule out acute infection.  The magnitude of the measured result is not indicative of the amount of   antibody present. The concentrations of anti-Toxoplasma gondii IgG in a given   specimen determined with assays from different manufacturers can vary due to   differences in assay methods and reagent specificity.     02/08/2020 <3.0 0.0 - 7.1 IU/mL Final     Comment:     Negative- Absence of detectable Toxoplasma gondii IgG antibodies. A negative   result does not rule out acute infection.  The magnitude of the measured result is not indicative of the amount of   antibody present. The concentrations of anti-Toxoplasma gondii IgG in a given   specimen determined with assays from different manufacturers can vary due to   differences in assay methods and reagent specificity.       No results found for: H1IGG, H2IGG, EBVCAM    Imaging:  Recent Results (from the past 48 hour(s))   US Abdomen Complete    Narrative    EXAMINATION: US ABDOMEN COMPLETE, 11/15/2020 9:20 AM     COMPARISON: 2/8/2020    HISTORY: Concern for abscess, patient unable to get CT scan due to  elevated creatinine    TECHNIQUE: The abdomen was scanned in standard fashion with  specialized ultrasound transducer(s) using both gray-scale and limited  color Doppler  techniques.    FINDINGS:    Liver: The liver demonstrates normal homogeneous echotexture. The  liver measures 19.7 cm in craniocaudal dimension. No evidence of a  focal hepatic mass. The main portal vein is patent with antegrade  flow. Gallbladder: There is no wall thickening, positive sonographic  Yoon's sign or evidence for cholelithiasis.    Bile Ducts: Both the intra- and extrahepatic biliary system are of  normal caliber. The common bile duct measures 3.7 mm in diameter.    Pancreas: Not well visualized.    Kidneys: Both kidneys are of normal echotexture, without mass or  hydronephrosis. The craniocaudal dimensions are: right- 10.5, left-  11.5.    Spleen: The spleen is normal in size, measuring 11.1 in sagittal  dimension.    Aorta and IVC: The visualized portions of the aorta and IVC are  unremarkable. The proximal aorta measures 2.7 cm in diameter and the  IVC measures 2.4 cm in diameter.    Fluid: Small volume of ascites. Partially imaged right pleural  effusion.        Impression    IMPRESSION:   1. Hepatomegaly with small volume ascites. No intra-abdominal abscess  visualized. If there is continued clinical concern for abscess,  consider noncontrast CT scan of the abdomen/pelvis.  2. Partially imaged right pleural effusion, likely trace.    I have personally reviewed the examination and initial interpretation  and I agree with the findings.    DEION FLANAGAN MD

## 2020-11-15 NOTE — PLAN OF CARE
D: admitted 11/14 directly from ID clinic with driveline infection (likely MSSA) and elevated INR (8.2).  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   Changed:   - LVAD dressing changed last night. Small amount of purulent drainage present with erythema around site.   - COVID test collected and sent, results pending.  - NPO since MN for abd US this AM  Running: intermittent abx  PRN: Ambien at HS     A: A0x4. VSS on RA/home CPAP at HS. Tele shows paced rhythm, rate 100-110s. Afebrile, but pt reports getting intermittent chills. LVAD HM3 numbers WNL, except intermittent low PI (2.3-3.9). Dressing CDI. Urinating adequately. On enteric precautions until C-diff ruled out, but pt has not had BM since 11/14 AM. Up independently. BG stable overnight. Slept between cares.      P: Continue to monitor Pt status and report changes to Cards 2.      [id number]  Antonette Rebolledo RN on 11/15/2020 at 6:07 AM

## 2020-11-15 NOTE — PROGRESS NOTES
[facility]     Cardiology Progress Note- Cards 2        Date of Admission:  11/14/2020     Assessment & Plan: HVSL   Mr. Tanner is a 65 year old male who has a past medical history significant for NICM LVEF 15-20% s/p LVAD HM3 2/18/20 c/b by MSSA driveline infection (11/5/20), moderate nonobstructive CAD, severe MR, HTN, ABHINAV (uses CPAP), DM2, CKD III, HLD, ANA, and prior tobacco use who was admitted directly from ID clinic with GPC bacteremia, likely MSSA.     #Previous MSSA driveline infection  #Staphylococcus aureus Bacteremia (Likely MSSA)  #Diarrhea, (improved)      Initially developed pain followed by drainage ~11/1, started on doxycycline on 11/5 but switched to TMP/SMX when the culture revealed MSSA resistant to doxy-subsequently. Blood cultures obtained which were negative. After starting TMP/SMX there was some improvement in the pain/drainage although not resolved.1/14 directly admitted by ID after BC positive for GPC, likely MSSA. Discussed with ID who recommend repeat blood cultures, abdominal ultrasound of driveline site, and starting Cefazolin. Of note, patient did have a few days of diarrhea which had improved on day of admission.       Most recently, abdominal imaging revealed no intraabdominal fluid collection concerning for abscess/infectious process.      After discussing results with Transplant ID, we decided to pursue more extensive diagnostic measures. Will proceed with Abdomen/pelvis CT scan without IV contrast given concomitant chronic renal insufficiency and transthoracic echocardiogram.           - ID consulted, appreciate recs   - Ultrasound abdomen (pending)  - BCx2- NGTD (pending)    > Repeat on 11/16 and 11/17 (2 sets each time); until negative  - Stopped bactrim (11/5 - 11/14)   - Started Cefazolin 2g IV q12hrs per ID recs (11/14 - 11/*)  - Ordered CT abdomen-pelvis without IV contrast (given history of chronic renal  insufficiency)  - Ordered TTE  - Stool studies: Pending C difficile Toxin B PCR    #NICM LVEF 15-20%, NYHA III s/p HM III LVAD 2/2020  - ACEi/ARB: Lisinopril 10mg daily   - Fluid status: Hypervolemic     > Diuresis: stopped PTA Furosemide 40 mg daily (started Bumetanide 2mg per oral BID)  - Afterload reduction: Hydralazine 100mg per oral TID (will consider increasing to 100 mg QID)    >> Will likely require more MAP control on discharge (ie. Amlodipine)   - BB: Not on d/t RV dysfunction   - Aldosterone antagonist: Not on due to variable renal function  - SCD prophylaxis: s/p ICD stable lead parameters and normal device function   - Fluid status: Hypervolemic     #Atrial Fibrillation, VT/VF     S/p DCCV with some residual self-limiting AF.   - Amiodarone 200mg daily  - Warfarin, pharmacy to dose, hold given high INR  - Daily INR (goal 2-3)    #Supratherapeutic INR    > Multifactorial    No symptoms/signs of active bleeding. Does not need vitamin K at this time. Suspect elevated INR d/t polypharmacy including recent addition of TMP/SMX/Amiodarone and poor appetite.    - Hold warfarin and TMP/SMX  - one time dose of 1mg of oral Vitamin K  - Monitor for signs/symptoms of bleeding   - Monitor INR daily    #CKD (chronic kidney disease) stage 4, GFR 15-29 ml/min (H) with progressive creatinine rise in last 1-2 months        Follows with nephrology as outpatient. CKD Pre-dated VAD. Initially d/t HTN +/- DM as this was more recently diagnosed. Also required iHD post cardiac arrest. HIV/HBV/HCV negative, SPEP negative, hemolysis unlikely. Previous UA non-nephritc. Renal/bladder ultrasound down as outpatient at outside clinic last week, awaiting results.        Renal parameters of function have improved on admission, however will need to continuous surveillance/clinical correlation. To this day, no clear etiology has been identified. There may be a component of increased systemic vascular resistance promoting a pathological  process within the spectrum of cardiorenal etiologies. Will continue to optimize afterload reducing medications     - F/u on OSH renal/bladder ultrasound results   - Avoid nephrotoxic agents  - Daily BMP   - Monitor I/O, electrolytes     #Anemia  Multifactorial and d/t likely ACD and mild hemolysis from VAD. No active signs of bleeding.   - Daily HGB  - Transfuse <7g/dL     # Diabetes Mellitus type II  - sliding scale insulin  - 12 units of Basaglar at bedtime     Diet: Regular  DVT Prophylaxis: Warfarin, held INR supratherapeutic   Code Status: Full  Fluids: PO  Lines: PIV, driveline      Diet:     DVT Prophylaxis: Warfarin  Guevara Catheter: not present  Code Status:         Disposition Plan   Expected discharge: 4 - 7 days, recommended to prior living arrangement once Infectious Work-up completed and treatment plan established; pending clinical stability.      Entered: Bhupendra Castano MD 11/15/2020, 7:35 AM       The patient's care was discussed with the Attending Physician, Dr. Nader Cisneros MD PhD.    Bhupendra Castano MD  [facility]   Please see sign in/sign out for up to date coverage information  ______________________________________________________________________    Interval History   NAEON. Nursing notes reviewed.    Data reviewed today: I reviewed all medications, new labs and imaging results over the last 24 hours. I personally reviewed no imaging or EKG's to review for today.     Physical Exam   Vital Signs: Temp: 99.6  F (37.6  C) Temp src: Axillary BP: 124/84 Pulse: 112   Resp: 18 SpO2: 94 % O2 Device: BiPAP/CPAP    Weight: 220 lbs 0 oz  General: Well-nourished, no acute distress, sitting upright, speaking in complete sentences, appropriate   Eyes: PERRL, conjunctivae pink no scleral icterus, left lateral conjunctival injection  ENT: Moist mucus membranes, posterior oropharynx clear without erythema or exudates  Respiratory:  Lungs clear to auscultation bilaterally, no crackles/rubs/wheezes. Good air movement  CV: Continues flow murmur, no LE/sacral edema, JVP ~12-14 cm  GI:  Abdomen distended. Clean dressing over driveline, no surrounding erythema. Nontender to palpation. No guarding or rebound.   Skin: Warm, dry. No rashes or petechiae  Musculoskeletal: No peripheral edema or calf tenderness  Neuro: Alert and oriented to person/place/time  Psychiatric: Normal affect    Data   Results for orders placed or performed during the hospital encounter of 11/14/20   US Abdomen Complete     Status: None    Narrative    EXAMINATION: US ABDOMEN COMPLETE, 11/15/2020 9:20 AM     COMPARISON: 2/8/2020    HISTORY: Concern for abscess, patient unable to get CT scan due to  elevated creatinine    TECHNIQUE: The abdomen was scanned in standard fashion with  specialized ultrasound transducer(s) using both gray-scale and limited  color Doppler techniques.    FINDINGS:    Liver: The liver demonstrates normal homogeneous echotexture. The  liver measures 19.7 cm in craniocaudal dimension. No evidence of a  focal hepatic mass. The main portal vein is patent with antegrade  flow. Gallbladder: There is no wall thickening, positive sonographic  Yoon's sign or evidence for cholelithiasis.    Bile Ducts: Both the intra- and extrahepatic biliary system are of  normal caliber. The common bile duct measures 3.7 mm in diameter.    Pancreas: Not well visualized.    Kidneys: Both kidneys are of normal echotexture, without mass or  hydronephrosis. The craniocaudal dimensions are: right- 10.5, left-  11.5.    Spleen: The spleen is normal in size, measuring 11.1 in sagittal  dimension.    Aorta and IVC: The visualized portions of the aorta and IVC are  unremarkable. The proximal aorta measures 2.7 cm in diameter and the  IVC measures 2.4 cm in diameter.    Fluid: Small volume of ascites. Partially imaged right pleural  effusion.        Impression    IMPRESSION:   1.  Hepatomegaly with small volume ascites. No intra-abdominal abscess  visualized. If there is continued clinical concern for abscess,  consider noncontrast CT scan of the abdomen/pelvis.  2. Partially imaged right pleural effusion, likely trace.    I have personally reviewed the examination and initial interpretation  and I agree with the findings.    DEION FLANAGAN MD   Basic metabolic panel     Status: Abnormal   Result Value Ref Range    Sodium 134 133 - 144 mmol/L    Potassium 4.9 3.4 - 5.3 mmol/L    Chloride 102 94 - 109 mmol/L    Carbon Dioxide 25 20 - 32 mmol/L    Anion Gap 7 3 - 14 mmol/L    Glucose 194 (H) 70 - 99 mg/dL    Urea Nitrogen 35 (H) 7 - 30 mg/dL    Creatinine 1.98 (H) 0.66 - 1.25 mg/dL    GFR Estimate 34 (L) >60 mL/min/[1.73_m2]    GFR Estimate If Black 39 (L) >60 mL/min/[1.73_m2]    Calcium 8.8 8.5 - 10.1 mg/dL   Hepatic panel     Status: Abnormal   Result Value Ref Range    Bilirubin Direct 0.4 (H) 0.0 - 0.2 mg/dL    Bilirubin Total 0.7 0.2 - 1.3 mg/dL    Albumin 2.6 (L) 3.4 - 5.0 g/dL    Protein Total 7.8 6.8 - 8.8 g/dL    Alkaline Phosphatase 184 (H) 40 - 150 U/L    ALT 56 0 - 70 U/L    AST 48 (H) 0 - 45 U/L   INR     Status: Abnormal   Result Value Ref Range    INR 6.21 (HH) 0.86 - 1.14   CBC with platelets     Status: Abnormal   Result Value Ref Range    WBC 18.4 (H) 4.0 - 11.0 10e9/L    RBC Count 5.21 4.4 - 5.9 10e12/L    Hemoglobin 10.5 (L) 13.3 - 17.7 g/dL    Hematocrit 35.7 (L) 40.0 - 53.0 %    MCV 69 (L) 78 - 100 fl    MCH 20.2 (L) 26.5 - 33.0 pg    MCHC 29.4 (L) 31.5 - 36.5 g/dL    RDW 20.7 (H) 10.0 - 15.0 %    Platelet Count 430 150 - 450 10e9/L   Asymptomatic COVID-19 Virus (Coronavirus) by PCR     Status: None    Specimen: Nasopharyngeal   Result Value Ref Range    COVID-19 Virus PCR to [facility] - Source Nasopharyngeal     COVID-19 Virus PCR to [facility] - Result       Test received-See reflex to IDDL test SARS CoV2 (COVID-19) Virus RT-PCR   Glucose by meter     Status: Abnormal    Result Value Ref Range    Glucose 190 (H) 70 - 99 mg/dL   INR     Status: Abnormal   Result Value Ref Range    INR 6.13 (HH) 0.86 - 1.14   Basic metabolic panel     Status: Abnormal   Result Value Ref Range    Sodium 133 133 - 144 mmol/L    Potassium 4.4 3.4 - 5.3 mmol/L    Chloride 102 94 - 109 mmol/L    Carbon Dioxide 21 20 - 32 mmol/L    Anion Gap 10 3 - 14 mmol/L    Glucose 143 (H) 70 - 99 mg/dL    Urea Nitrogen 31 (H) 7 - 30 mg/dL    Creatinine 1.78 (H) 0.66 - 1.25 mg/dL    GFR Estimate 39 (L) >60 mL/min/[1.73_m2]    GFR Estimate If Black 45 (L) >60 mL/min/[1.73_m2]    Calcium 8.5 8.5 - 10.1 mg/dL   Magnesium     Status: None   Result Value Ref Range    Magnesium 2.0 1.6 - 2.3 mg/dL   Glucose by meter     Status: Abnormal   Result Value Ref Range    Glucose 159 (H) 70 - 99 mg/dL   SARS-CoV-2 COVID-19 Virus (Coronavirus) RT-PCR Nasopharyngeal     Status: None    Specimen: Nasopharyngeal   Result Value Ref Range    SARS-CoV-2 Virus Specimen Source Nasopharyngeal     SARS-CoV-2 PCR Result NEGATIVE     SARS-CoV-2 PCR Comment       Testing was performed using the Evirx SARS-CoV-2 Assay on the [employer] Instrument System.   Additional information about this Emergency Use Authorization (EUA) assay can be found via   the Lab Guide.     CBC with platelets differential     Status: Abnormal   Result Value Ref Range    WBC 19.9 (H) 4.0 - 11.0 10e9/L    RBC Count 4.94 4.4 - 5.9 10e12/L    Hemoglobin 10.0 (L) 13.3 - 17.7 g/dL    Hematocrit 33.6 (L) 40.0 - 53.0 %    MCV 68 (L) 78 - 100 fl    MCH 20.2 (L) 26.5 - 33.0 pg    MCHC 29.8 (L) 31.5 - 36.5 g/dL    RDW 20.2 (H) 10.0 - 15.0 %    Platelet Count 418 150 - 450 10e9/L    Diff Method Automated Method     % Neutrophils 87.6 %    % Lymphocytes 4.0 %    % Monocytes 6.2 %    % Eosinophils 0.1 %    % Basophils 0.2 %    % Immature Granulocytes 1.9 %    Nucleated RBCs 0 0 /100    Absolute Neutrophil 17.4 (H) 1.6 - 8.3 10e9/L    Absolute Lymphocytes 0.8 0.8 - 5.3 10e9/L    Absolute  Monocytes 1.2 0.0 - 1.3 10e9/L    Absolute Eosinophils 0.0 0.0 - 0.7 10e9/L    Absolute Basophils 0.0 0.0 - 0.2 10e9/L    Abs Immature Granulocytes 0.4 0 - 0.4 10e9/L    Absolute Nucleated RBC 0.0    Lactate Dehydrogenase     Status: None   Result Value Ref Range    Lactate Dehydrogenase 219 85 - 227 U/L   Glucose by meter     Status: Abnormal   Result Value Ref Range    Glucose 124 (H) 70 - 99 mg/dL   Lactic acid level STAT     Status: None   Result Value Ref Range    Lactate for Sepsis Protocol 1.8 0.7 - 2.0 mmol/L   Glucose by meter     Status: Abnormal   Result Value Ref Range    Glucose 161 (H) 70 - 99 mg/dL   Infectious Disease General Adult IP Consult: Patient to be seen: Routine within 24 hrs; Call back #: Cards 2 [facility] phone [phone]; Driveline infection, followed by ID as outpatient, admitted as cultures grew GPC, please assist with abx recommendation...     Status: None ()    Negar Mason DO     11/15/2020  1:08 PM  [facility]  Transplant Infectious Disease Consult Note - New Patient     Patient:  Eliseo Tanner, Date of birth 1955, Medical   record number [id number]  Date of Visit:  11/15/2020  Consult requested by Dr. Nader Cisneros for evaluation of MSSA   Bacteremia         Assessment and Recommendations:   Assessment:  65 y/o male with chronic systolic HF and NICM s/p HM 3 on   2/18/2020 c/b polymicrobial bacteremia (P mirabilis and E   faecalis), ABHINAV on CPAP, type II DM, CKD stage III, MSSA exit   drive line infection who has developed MSSA bacteremia.      1. Complicated MSSA bacteremia w/ associated LVAD drive line   infection: developed pain around exit site followed by drainage~   11/1. Initially started on doxycycline 11/5 but switched to   tmp/smx when the culture revealed MSSA resistant to   doxy-subsequently switched to tmp/smx 1 S.S. BID. Blood cultures   were initially negative but then on recheck 11/12 Staph aureus   growth was observed.  Susceptibilities pending but presume this   will be MSSA since that is what his wound culture grew.    Abdominal US count not visualize an abscess. Given the MSSA will   need to complete a full evaluation especially given his LVAD and   ICD. Repeat blood cultures are pending.       2. Diarrhea: overall improved.     Historical Infectious Diseases Issues:  2/2020 P mirabilis and E faecalis bacteremia post VAD placement    Recommendations:  1. Blood cultures at [facility] are growing Staph   aureus-susceptibilities pending (#[phone])  2. Repeat blood cultures are pending  3. Please obtain a CT abd/pelvis-contrast will provide better   visualization but understand that he has reduced creatinine   clearance so if needed w/o contrast is okay  4. Obtain a TTE-he does have a ICD in place so may need to   consider a CHAMP  5. Continue cefazolin 2 grams IV q 12 hours    Transplant Infectious Disease will continue to follow with you.    Negar Bhatti DO   Pager [phone]         History of Infectious Disease Illness:   HPI:  Mr. Tanner is a patient I saw in the outpatient clinic on 11/12.   HPI partially obtained from this note.       65 y/o male with chronic systolic HF and NICM s/p HM 3 on   2/18/2020 and ICD 3/2020, ABHINAV on CPAP, type II DM, CKD stage III,   MSSA LVAD exit site infection who has developed MSSA bacteremia.    Post LVAD placement he developed a retrosternal hematoma and   bleeding as well as Proteus mirabilis and E faecalis bacteremia   s/p 2 weeks of ampicillin and ceftriaxone. Also had an ICD placed   on 3/16/2020 for secondary SCD prevention.Also follows with   nephrology for renal disease and progressive creatinine increase.  On 11/4 patient called to report new redness, tenderness, bloody   drainage x 5 days at drive line site  On 11/5 he was started on doxycycline due to concerns for   infection. 11/15 culturegrew large amounts of MSSA (R: to   doxycycline). Blood culture negative.   On  11/6 he went to the ED for SOB.   On 11/8 doxycyline was changed to tmp/smx 1 S.S. BID x 14 days  After my clinic visit on 11/12 I obtained repeat blood cultures   which have down grown Staph aureus. Also found to have an   elevated INR.     Since our visit on 11/12 he had 1 day of diarrhea which has   improved. Denies fevers or chills. He thinks the drainage from   the LVAD site has improved. No erythema.  He has a ICD in place   and has not noticed increased erythema or swelling. Has chronic   left shoulder pain but no new pains in his joints or back.     Transplants:  Coordinator Kay Padilla    Review of Systems: Remaining systems all reviewed and negative.  CONSTITUTIONAL:  No fevers or chills  EYES: negative for icterus or acute vision changes  ENT:  negative for acute hearing loss, tinnitus, sore throat  RESPIRATORY:  negative for cough, sputum or dyspnea  CARDIOVASCULAR:  negative for chest pain, palpitations  GASTROINTESTINAL:  negative for nausea, vomiting, diarrhea or   constipation  GENITOURINARY:  negative for dysuria or hematuria  HEME:  No easy bruising or bleeding  INTEGUMENT:  negative for rash or pruritus  NEURO:  Negative for headache or tremor      Past Medical History:   Diagnosis Date     Chronic systolic congestive heart failure (H)      History of implantable cardioverter-defibrillator (ICD)   placement      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)      LVAD (left ventricular assist device) present (H)        Past Surgical History:   Procedure Laterality Date     ANESTHESIA CARDIOVERSION N/A 2/28/2020    Procedure: ANESTHESIA, FOR CARDIOVERSION;  Surgeon: GENERIC   ANESTHESIA PROVIDER;  Location: [facility] OR     CV CENTRAL VENOUS CATHETER PLACEMENT N/A 2/13/2020    Procedure: Central Venous Catheter Placement;  Surgeon:   Chente Moss MD;  Location: [facility] HEART CARDIAC   CATH LAB     CV INTRA-AORTIC BALLOON PUMP INSERTION N/A 2/7/2020     Procedure: Intra-Aortic Balloon Pump Insertion;  Surgeon:   Jose Baldwin MD;  Location:  HEART CARDIAC CATH LAB     CV INTRA-AORTIC BALLOON PUMP INSERTION N/A 2020    Procedure: Intra-Aortic Balloon Pump Insertion;  Surgeon:   Chente Moss MD;  Location:  HEART CARDIAC   CATH LAB     CV RIGHT HEART CATH N/A 2020    Procedure: CV RIGHT HEART CATH;  Surgeon: Dalton Baeza MD;  Location:  HEART CARDIAC CATH LAB     CV SWAN LUCIANA PROCEDURE N/A 2020    Procedure: Clive Luciana Procedure;  Surgeon: Chente Moss MD;  Location:  HEART CARDIAC CATH LAB     EP ICD Bilateral 3/16/2020    Procedure: EP ICD;  Surgeon: Dali Day MD;  Location:  HEART   CARDIAC CATH LAB     INSERT VENTRICULAR ASSIST DEVICE LEFT (HEARTMATE II) N/A   2020    Procedure: INSERTION, LEFT VENTRICULAR ASSIST DEVICE (HEARTMATE   III);  Surgeon: Mac Jaramillo MD;  Location:  OR     THORACOSCOPY Right 3/6/2020    Procedure: RIGHT VIDEO-ASSISTED THORASCOPIC SURGERY, EVACUATION   OF HEMOTHORAX, PLACEMENT OF CHEST TUBES;  Surgeon: William Gan MD;  Location:  OR       History reviewed. No pertinent family history.    Social History     Social History Narrative     Not on file     Social History     Tobacco Use     Smoking status: Former Smoker     Quit date: 1994     Years since quittin.6     Smokeless tobacco: Never Used   Substance Use Topics     Alcohol use: Not on file     Drug use: Not on file       Immunization History   Administered Date(s) Administered     Flu, Unspecified 11/15/2019     Influenza (High Dose) 3 valent vaccine 10/25/2019     Pneumo Conj 13-V (2010&after) 2018       Patient Active Problem List   Diagnosis     Acute on chronic systolic congestive heart failure (H)     Anxiety     CKD (chronic kidney disease) stage 3, GFR 30-59 ml/min     Coronary artery disease involving native coronary artery of   native heart without angina  pectoris     GERD (gastroesophageal reflux disease)     Gout     Hyperlipidemia with target LDL less than 100     Nonischemic cardiomyopathy (H)     Non-nephrotic range proteinuria     ABHINAV on CPAP     Type 2 diabetes mellitus with stage 3 chronic kidney disease,   without long-term current use of insulin (H)     Heart failure (H)     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)     Cardiac arrest (H)     LVAD (left ventricular assist device) present (H)     Chronic systolic congestive heart failure (H)     CKD (chronic kidney disease) stage 4, GFR 15-29 ml/min (H)     Bacteremia            Current Medications & Allergies:   Antibiotic History:    Prophylaxis:      allopurinol  200 mg Oral or Feeding Tube Daily     amiodarone  200 mg Oral Daily     aspirin  81 mg Oral Daily     bumetanide  2 mg Oral BID     ceFAZolin  2 g Intravenous Q8H     FLUoxetine  20 mg Oral Daily     hydrALAZINE  100 mg Oral 4x Daily     insulin aspart  1-7 Units Subcutaneous TID AC     insulin aspart  1-5 Units Subcutaneous At Bedtime     insulin glargine  10 Units Subcutaneous At Bedtime     lisinopril  10 mg Oral Daily     magnesium oxide  400 mg Oral or Feeding Tube Daily     multivitamin w/minerals  1 tablet Oral Daily     polyethylene glycol  17 g Oral Daily     senna-docusate  1 tablet Oral BID    Or     senna-docusate  2 tablet Oral BID     sodium chloride (PF)  3 mL Intracatheter Q8H     warfarin-No DOSE today  1 each Does not apply no dose today   (warfarin)       Infusions/Drips:      - MEDICATION INSTRUCTIONS -       - MEDICATION INSTRUCTIONS -       Warfarin Therapy Reminder         Allergies   Allergen Reactions     Heparin      HIT screen positive 2/14/20, reflex DAVINA negative; however heme   recommended treating as if positive  HIT screen negative 2/11/20     Oxycodone Itching and Other (See Comments)     Chlorhexidine Rash            Physical Exam:     Patient Vitals for the past 24 hrs:   BP Temp Temp src Pulse Resp  "SpO2 Height Weight   11/15/20 1125 -- 97.4  F (36.3  C) Oral 111 18 95 % -- --   11/15/20 0825 101/87 98.4  F (36.9  C) Oral 112 18 95 % -- --   11/15/20 0300 124/84 99.6  F (37.6  C) Axillary 112 18 94 % -- --   11/14/20 2328 98/87 98.7  F (37.1  C) Oral 109 18 96 % -- --   11/14/20 2000 -- -- -- 112 -- -- 1.702 m (5' 7\") --   11/14/20 1948 (!) 115/90 97.9  F (36.6  C) Oral 110 18 97 % --   99.8 kg (220 lb)     Ranges for vital signs:  Temp:  [97.4  F (36.3  C)-99.6  F (37.6    C)] 97.4  F (36.3  C)  Pulse:  [109-112] 111  Resp:  [18] 18  BP: ()/(84-90) 101/87  SpO2:  [94 %-97 %] 95 %  Vitals:    11/14/20 1948   Weight: 99.8 kg (220 lb)       Physical Examination:  GENERAL:  well-appearing, in no acute distress. In bed  HEAD:  Head is normocephalic, atraumatic   EYES:  Eyes have anicteric sclerae without conjunctival   injection. Pupils reactive bilaterally.    ENT:  Oropharynx is moist without exudates or ulcers. Tongue is   midline  NECK:  Supple. No cervical lymphadenopathy  LUNGS:  Clear to auscultation bilaterally. No accessory muscle   use. Not on oxygen.   CARDIOVASCULAR:  HM noises. Minimal LE edema.    ABDOMEN:  Normal bowel sounds, soft, non-tender, non-distended.   No appreciable hepatosplenomegaly.  SKIN:  No acute rashes. LVAD dressing in place. ICD without   swelling or erythema.   EXTREMITIES: no joint swelling or erythema  NEUROLOGIC:  Grossly nonfocal. Active x4 extremities. Strength   equal.  LINES: PIV in place. No erythema around insertion site and   dressing intact.          Laboratory Data:     Metabolic Studies       Recent Labs   Lab Test 11/15/20  0541 11/14/20  2041 09/21/20  1440 09/21/20  1440 03/06/20  1615 03/06/20  1615 03/06/20  0322 03/06/20  0322 02/13/20  0206 02/13/20  0206 02/08/20  0408 02/08/20  0408    134   < >  --    < > 137  --  135   < > 132*   < > 134   POTASSIUM 4.4 4.9   < >  --    < > 4.1  --  4.1   < > 4.1  4.2     < > 3.5   CHLORIDE 102 102   < >  --    " < >  --   --  104   < > 96   < >   103   CO2 21 25   < >  --    < >  --   --  30   < > 22   < > 23   ANIONGAP 10 7   < >  --    < >  --   --  1*   < > 14   < > 8   BUN 31* 35*   < >  --    < >  --   --  16   < > 34*   < > 50*   CR 1.78* 1.98*   < >  --    < >  --   --  1.02   < > 1.58*   < >   2.81*   GFRESTIMATED 39* 34*   < >  --    < >  --   --  76   < > 45*   <   > 22*   * 194*   < >  --    < > 123*  --  149*   < > 154*   < >   155*   A1C  --   --   --   --   --   --   --   --   --   --   --  7.3*   CALOS 8.5 8.8   < >  --    < >  --   --  8.0*   < > 8.9   < > 8.2*   PHOS  --   --   --   --   --   --   --  3.8   < > 5.4*   < > 5.0*     MAG 2.0  --   --   --    < >  --   --  2.2   < > 2.0   < >  --    LACT  --   --   --  1.3  --  0.7   < >  --    < > 9.5*   < > 0.6*     CKT  --   --   --   --   --   --   --   --   --  39  --   --     < > = values in this interval not displayed.       Hepatic Studies    Recent Labs   Lab Test 11/15/20  0830 11/14/20  2041 11/12/20 10/16/20   BILITOTAL  --  0.7 0.5 0.6   DBIL  --  0.4*  --  0.0   ALKPHOS  --  184* 202.0* 139   PROTTOTAL  --  7.8 7.6 8.4   ALBUMIN  --  2.6* 3.7 4.3   AST  --  48* 65.0* 70   ALT  --  56 64* 88     --   --  322       Hematology Studies      Recent Labs   Lab Test 11/15/20  0830 11/14/20  2041 11/05/20 09/21/20  1438 07/13/20 06/16/20   WBC 19.9* 18.4* 9.4 7.2 7.0 7.0   ANEU 17.4*  --   --  5.3  --   --    ALYM 0.8  --   --  1.1  --   --    GIULIANO 1.2  --   --  0.7  --   --    AEOS 0.0  --   --  0.1  --   --    HGB 10.0* 10.5* 10.1* 10.9* 10.9* 10.7*   HCT 33.6* 35.7* 33.7 36.8* 36 35.5    430 385 315 382 378       Arterial Blood Gas Testing    Recent Labs   Lab Test 03/15/20  2235 03/06/20  1615 03/06/20  1550 02/26/20  1321 02/24/20  0345 02/24/20  0345 02/23/20  1953   PH 7.53* 7.41 7.39  --   --  7.46* 7.47*   PCO2 35 42 45  --   --  42 37   PO2 126* 96 49*  --   --  123* 81   HCO3 29* 26 27  --   --  30* 27   O2PER 21 65 65 21.0    < > 2L  2L 21    < > = values in this interval not displayed.        Medication levels    Recent Labs   Lab Test 02/16/20  0400 02/13/20  2147 02/13/20  2147   VANCOMYCIN 18.5   < >  --    TOBRA  --   --  13.0    < > = values in this interval not displayed.     Inflammatory Markers    Recent Labs   Lab Test 11/05/20 09/21/20  1438   CRP 54.9 6.6     Pancreatitis testing    Recent Labs   Lab Test 02/08/20  0408   TRIG 57       Gout Labs      Recent Labs   Lab Test 02/08/20  0408   URIC 7.5*       Clotting Studies    Recent Labs   Lab Test 11/15/20  0541 11/14/20  2041 11/04/20 10/02/20 03/20/20  0530 03/20/20  0530   INR 6.13* 6.21* 2.4* 2.3*   < > 2.84*   PTT  --   --   --   --   --  59*    < > = values in this interval not displayed.       Iron Testing    Recent Labs   Lab Test 11/15/20  0830 02/08/20  0408 02/08/20  0408   IRON  --   --  25*   FEB  --   --  306   IRONSAT  --   --  8*   HEAVENLY  --   --  189   MCV 68*   < > 87   B12  --   --  752    < > = values in this interval not displayed.       Thyroid Studies     Recent Labs   Lab Test 02/08/20  0408   TSH 1.72       Urine Studies     Recent Labs   Lab Test 02/22/20  0944 02/13/20  0334 02/07/20 2029   URINEPH 5.5 6.0 5.0   NITRITE Negative Negative Negative   LEUKEST Small* Small* Negative   WBCU 2 81* 3     Last Culture results with specimen source  Culture Micro   Date Value Ref Range Status   11/14/2020 No growth after 9 hours  Preliminary   11/14/2020 No growth after 9 hours  Preliminary   09/21/2020 No growth  Final   09/21/2020 No growth  Final   03/13/2020 No growth  Final   03/13/2020 No growth  Final   03/03/2020 No growth  Final   03/03/2020 No growth  Final   02/22/2020 No growth  Final   02/22/2020 No growth  Final   02/16/2020 No growth  Final   02/16/2020 No growth  Final   02/15/2020 No growth  Final   02/15/2020 (A)  Final    Cultured on the 2nd day of incubation:  Staphylococcus epidermidis     02/15/2020   Final    Critical Value/Significant  Value, preliminary result only,   called to and read back by  Cee Benavidez RN on 2.16.20 at 2220.  JRT     02/15/2020   Final    (Note)  POSITIVE for STAPHYLOCOCCUS EPIDERMIDIS and POSITIVE for the mecA  gene (resistant to methicillin) by Verigene multiplex nucleic   acid  test. Final identification and antimicrobial susceptibility   testing  will be verified by standard methods.    Specimen tested with Verigene multiplex, gram-positive blood   culture  nucleic acid test for the following targets: Staph aureus, Staph  epidermidis, Staph lugdunensis, other Staph species, Enterococcus  faecalis, Enterococcus faecium, Streptococcus species, S.   agalactiae,  S. anginosus grp., S. pneumoniae, S. pyogenes, Listeria sp., mecA  (methicillin resistance) and Ernst/B (vancomycin resistance).    Critical Value/Significant Value called to and read back by   Cee Benavidez RN, 2.17.20 @ 0207 pt.     02/14/2020 No growth  Final   02/13/2020 (A)  Final    Cultured on the 1st day of incubation:  Proteus mirabilis  Susceptibility testing done on previous specimen     02/13/2020   Final    Critical Value/Significant Value, preliminary result only,   called to and read back by  Mima Cabrera RN. @1426. 2.13.20. BS.      02/13/2020 (A)  Final    Cultured on the 1st day of incubation:  Enterococcus faecalis  Susceptibility testing done on previous specimen     02/13/2020   Final    Critical Value/Significant Value, preliminary result only,   called to and read back by  Alisson Castillo RN on 2.13.20 at 1728.  JRT     02/13/2020   Final    (Note)  POSITIVE for ENTEROCOCCUS FAECALIS and NEGATIVE for Ernst/vanB   genes  by Verigene multiplex nucleic acid test. Final identification and  antimicrobial susceptibility testing will be verified by standard  methods.    Specimen tested with Verigene multiplex, gram-positive blood   culture  nucleic acid test for the following targets: Staph aureus, Staph  epidermidis, Staph lugdunensis,  other Staph species, Enterococcus  faecalis, Enterococcus faecium, Streptococcus species, S.   agalactiae,  S. anginosus grp., S. pneumoniae, S. pyogenes, Listeria sp., mecA  (methicillin resistance) and Ernst/B (vancomycin resistance).    Critical Value/Significant Value called to and read back by MARIA EUGENIA MILLAN RN 2/13/20 2036 EH          Specimen Description   Date Value Ref Range Status   11/14/2020 Blood Left Hand  Final   11/14/2020 Blood Left Arm  Final   09/21/2020 Blood Left Arm  Final   09/21/2020 Blood Right Arm  Final   03/13/2020 Blood Left Hand  Final   03/13/2020 Blood Right Hand  Final   03/03/2020 Blood Left Hand  Final   03/03/2020 Blood Right Arm  Final   02/22/2020 Blood Right Hand  Final   02/22/2020 Blood Right Hand  Final   02/19/2020 Nares  Final   02/16/2020 Blood Right Hand  Final   02/16/2020 Blood Left Hand  Final   02/15/2020 Blood Right Hand  Final   02/15/2020 Blood Left Hand  Final   02/14/2020 Blood Right Hand  Final   02/13/2020 Blood Left Hand  Final   02/13/2020 Blood Right Hand  Final   02/13/2020 Nasopharyngeal  Final        Last check of C difficile  No results found for: CDBPCT    Syphilis Testing    Treponema Antibodies   Date Value Ref Range Status   02/12/2020 Nonreactive NR^Nonreactive Final       Quantiferon testing   Recent Labs   Lab Test 11/15/20  0830 11/05/20 02/12/20  0440 02/12/20  0440 02/08/20  0408   TBRES  --   --   --  Negative Negative   LYMPH 4.0 13.5   < > 4.8 17.5    < > = values in this interval not displayed.       Virology:  Respiratory virus testing    Recent Labs   Lab Test 11/14/20  2140 02/13/20  0334   INFZA  --  Negative   INFZB  --  Negative   IRSV  --  Negative   [id number] Nasopharyngeal  --    SARSCOVRES NEGATIVE  --      Hepatitis B Testing     Recent Labs   Lab Test 02/12/20  0440 02/08/20  0408   AUSAB 0.69 1.99   HBCAB Nonreactive Nonreactive   HEPBANG Nonreactive Nonreactive        Hepatitis C Antibody   Date Value Ref Range Status    02/12/2020 Nonreactive NR^Nonreactive Final     Comment:     Assay performance characteristics have not been established for   newborns,   infants, and children     02/08/2020 Nonreactive NR^Nonreactive Final     Comment:     Assay performance characteristics have not been established for   newborns,   infants, and children         CMV Antibody IgG   Date Value Ref Range Status   02/12/2020 >8.0 (H) 0.0 - 0.8 AI Final     Comment:     Positive  Antibody index (AI) values reflect qualitative changes in   antibody   concentration that cannot be directly associated with clinical   condition or   disease state.     02/08/2020 7.8 (H) 0.0 - 0.8 AI Final     Comment:     Positive  Antibody index (AI) values reflect qualitative changes in   antibody   concentration that cannot be directly associated with clinical   condition or   disease state.       Varicella Zoster Virus Antibody IgG   Date Value Ref Range Status   02/12/2020 2.0 (H) 0.0 - 0.8 AI Final     Comment:     Positive, suggests prev. exposure and probable immunity  Antibody index (AI) values reflect qualitative changes in   antibody   concentration that cannot be directly associated with clinical   condition or   disease state.     02/08/2020 2.4 (H) 0.0 - 0.8 AI Final     Comment:     Positive, suggests prev. exposure and probable immunity  Antibody index (AI) values reflect qualitative changes in   antibody   concentration that cannot be directly associated with clinical   condition or   disease state.       Toxoplasma Antibody IgG   Date Value Ref Range Status   02/12/2020 <3.0 0.0 - 7.1 IU/mL Final     Comment:     Negative- Absence of detectable Toxoplasma gondii IgG   antibodies. A negative   result does not rule out acute infection.  The magnitude of the measured result is not indicative of the   amount of   antibody present. The concentrations of anti-Toxoplasma gondii   IgG in a given   specimen determined with assays from different manufacturers can    vary due to   differences in assay methods and reagent specificity.     02/08/2020 <3.0 0.0 - 7.1 IU/mL Final     Comment:     Negative- Absence of detectable Toxoplasma gondii IgG   antibodies. A negative   result does not rule out acute infection.  The magnitude of the measured result is not indicative of the   amount of   antibody present. The concentrations of anti-Toxoplasma gondii   IgG in a given   specimen determined with assays from different manufacturers can   vary due to   differences in assay methods and reagent specificity.       No results found for: H1IGG, H2IGG, EBVCAM    Imaging:  Recent Results (from the past 48 hour(s))   US Abdomen Complete    Narrative    EXAMINATION: US ABDOMEN COMPLETE, 11/15/2020 9:20 AM     COMPARISON: 2/8/2020    HISTORY: Concern for abscess, patient unable to get CT scan due   to  elevated creatinine    TECHNIQUE: The abdomen was scanned in standard fashion with  specialized ultrasound transducer(s) using both gray-scale and   limited  color Doppler techniques.    FINDINGS:    Liver: The liver demonstrates normal homogeneous echotexture. The  liver measures 19.7 cm in craniocaudal dimension. No evidence of   a  focal hepatic mass. The main portal vein is patent with antegrade  flow. Gallbladder: There is no wall thickening, positive   sonographic  Yoon's sign or evidence for cholelithiasis.    Bile Ducts: Both the intra- and extrahepatic biliary system are   of  normal caliber. The common bile duct measures 3.7 mm in diameter.    Pancreas: Not well visualized.    Kidneys: Both kidneys are of normal echotexture, without mass or  hydronephrosis. The craniocaudal dimensions are: right- 10.5,   left-  11.5.    Spleen: The spleen is normal in size, measuring 11.1 in sagittal  dimension.    Aorta and IVC: The visualized portions of the aorta and IVC are  unremarkable. The proximal aorta measures 2.7 cm in diameter and   the  IVC measures 2.4 cm in diameter.    Fluid: Small  volume of ascites. Partially imaged right pleural  effusion.        Impression    IMPRESSION:   1. Hepatomegaly with small volume ascites. No intra-abdominal   abscess  visualized. If there is continued clinical concern for abscess,  consider noncontrast CT scan of the abdomen/pelvis.  2. Partially imaged right pleural effusion, likely trace.    I have personally reviewed the examination and initial   interpretation  and I agree with the findings.    DEION FLANAGAN MD        Blood culture     Status: None (Preliminary result)    Specimen: Blood    Left Hand   Result Value Ref Range    Specimen Description Blood Left Hand     Culture Micro No growth after 9 hours    Blood culture     Status: None (Preliminary result)    Specimen: Blood    Left Arm   Result Value Ref Range    Specimen Description Blood Left Arm     Culture Micro No growth after 9 hours

## 2020-11-15 NOTE — PHARMACY-ANTICOAGULATION SERVICE
Clinical Pharmacy - Warfarin Dosing Consult     Pharmacy has been consulted to manage this patient s warfarin therapy.  Indication: LVAD/RVAD  Therapy Goal: INR 2-3  OP Anticoag Clinic: [facility]  Warfarin Prior to Admission: Yes  Warfarin PTA Regimen: 4 mg daily  Significant drug interactions: aspirin, amiodarone, ancef, fluoxetine  Recent documented change in oral intake/nutrition: Unknown    INR   Date Value Ref Range Status   11/14/2020 6.21 (HH) 0.86 - 1.14 Final     Comment:     Critical Value called to and read back by  KRISTEN CURRY ON 6C AT 2200 11/14/20 BY AEG     11/04/2020 2.4 (A) 0.90 - 1.10 Final     Comment:     Outside Lab      Chromogenic Factor 10   Date Value Ref Range Status   03/16/2020 27 (L) 70 - 130 % Final     Comment:     Therapeutic Range:  A Chromogenic Factor 10 level of approximately 20-40%   inversely correlates with an INR of 2-3 for patients receiving Warfarin.   Chromogenic Factor 10 levels below 20% indicate an INR greater than 3 and   levels above 40% indicate an INR less than 2.         Recommend holding warfarin today due to elevated INR.  Pharmacy will monitor Eliseo Tanner daily and order warfarin doses to achieve specified goal.      Please contact pharmacy as soon as possible if the warfarin needs to be held for a procedure or if the warfarin goals change.

## 2020-11-15 NOTE — SUMMARY OF CARE
Pt belonging: Electric shaver, C-pap machine , cell phone w/ , glasses, pair shoes, hat, coat, 2 pair pj pants, 3 pairs of socks, underwear 2 battery bags and a shower bag, and a pillow.

## 2020-11-15 NOTE — PHARMACY-ADMISSION MEDICATION HISTORY
Admission Medication History Completed by Pharmacy    See [facility] Admission Navigator for allergy information, preferred outpatient pharmacy, prior to admission medications and immunization status.     Medication History Sources:     The patient    Outside pharmacy fill records ([facility])    Care Everywhere    Changes made to PTA medication list (reason):    Added:   o Omeprazole 20mg capsule  o Atorvastatin 20mg tablet    Deleted:   o Voltaren 1% gel- patient no longer needs this product  o Meclizine 25mg tab- patient no longer takes this medication as he only needed to fill it once for 3 tablets  o Melatonin 5mg- patient states he doesn't use this medication because it doesn't work for him  o Robaxin 500mg - prescription was from March 2020, patient states he no longer needs that medication  o Zofran 4mg- patient states he no longer needs that medication and hasn't taken it for about a year  o Protonix 40mg- patient switched to omeprazole    Changed:   o Potassium chloride from 10mEq daily to 20 mEq daily  o 10 units of Lantus to 12 units of Basaglar at bedtime.     Additional Information:    The patient seemed to be a very reliable historian    Confirmed dosing of Lasix as it looked like his last fill record was filled for 20mg per day    Confirmed hydralazine dosing as last fill record instructions state 75mg three times daily. Confirmed dose was increased per Care Everywhere on 07/23.     Antimicrobial History  Medication Name: Bactrim 400-80mg  Indication: driveline site infection  Instructions: Take 1 tablet twice daily   Duration: 14 days (11/09-11/23)  Patient is still taking the course of antibiotics.  Complications: No     Medication Name: Doxycyline 100mg   Indication: Driveline site infection  Instructions: Take 100mg twice daily  Duration: 14 days- but only took from 11/05-11/08  Patient did not complete the course of antibiotics due to resistance and was switched to Bactrim.      Anticoagulation  Medication: Wafarin 4mg   Indication: LVAD  INR Goal: 2-3  Current dosing regimen: 4mg all days of the week.   Last dose (4 mg) was taken in the evening on 11/13      Prior to Admission medications    Medication Sig Last Dose Taking? Auth Provider   acetaminophen (TYLENOL) 325 MG tablet Take 2 tablets (650 mg) by mouth every 4 hours as needed for mild pain or fever 11/14/2020 at AM Yes Mono Gambino PA-C   albuterol (PROAIR HFA/PROVENTIL HFA/VENTOLIN HFA) 108 (90 Base) MCG/ACT inhaler Inhale 2 puffs into the lungs every 6 hours as needed for wheezing Past Month at AM Yes Mono Gambino PA-C   allopurinol (ZYLOPRIM) 100 MG tablet 2 tablets (200 mg) by Oral or Feeding Tube route daily 11/14/2020 at AM Yes Mono Gambino PA-C   amiodarone (PACERONE) 200 MG tablet Take 1 tablet (200 mg) by mouth daily 11/14/2020 at AM Yes Mervat Decker PA-C   aspirin (ASA) 81 MG EC tablet Take 1 tablet (81 mg) by mouth daily 11/14/2020 at AM Yes Nader Cisneros MD   atorvastatin (LIPITOR) 20 MG tablet Take 20 mg by mouth daily 11/13/2020 at PM Yes Unknown, Entered By History   co-enzyme Q-10 200 MG CAPS 200 mg by Oral or Feeding Tube route daily 11/14/2020 at AM Yes Mono Gambino PA-C   FLUoxetine (PROZAC) 20 MG capsule Take 1 capsule (20 mg) by mouth daily 11/14/2020 at AM Yes Mono Gambino PA-C   furosemide (LASIX) 20 MG tablet Take 2 tablets (40 mg) by mouth daily 11/14/2020 at AM Yes Mervat Decker PA-C   hydrALAZINE (APRESOLINE) 25 MG tablet Take 4 tablets (100 mg) by mouth 3 times daily 11/14/2020 at PM Yes Mervat Decker PA-C   insulin glargine (BASAGLAR KWIKPEN) 100 UNIT/ML pen Inject 12 Units Subcutaneous At Bedtime 11/13/2020 at PM Yes Unknown, Entered By History   lisinopril (ZESTRIL) 10 MG tablet Take 1 tablet (10 mg) by mouth daily 11/14/2020 at AM Yes Mervat Decker PA-C   magnesium oxide (MAG-OX) 400 MG tablet 1 tablet (400 mg) by Oral or Feeding Tube  route daily 11/14/2020 at AM Yes Mono Gambino PA-C   multivitamin w/minerals (THERA-VIT-M) tablet Take 1 tablet by mouth daily 11/14/2020 at AM Yes Mono Gambino PA-C   omeprazole (PRILOSEC) 20 MG DR capsule Take 20 mg by mouth daily 11/14/2020 at AM Yes Unknown, Entered By History   potassium chloride ER (KLOR-CON M) 20 MEQ CR tablet Take 20 mEq by mouth daily 11/14/2020 at AM Yes Unknown, Entered By History   sulfamethoxazole-trimethoprim (BACTRIM) 400-80 MG tablet TAKE 1 TABLET BY MOUTH TWICE DAILY FOR FOURTEEN DAYS (STOP TAKING DOXYCYCLINE) 11/14/2020 at PM Yes Reported, Patient   warfarin ANTICOAGULANT (COUMADIN) 4 MG tablet Take 4 mg by mouth daily 11/13/2020 at PM Yes Unknown, Entered By History   zolpidem (AMBIEN) 5 MG tablet Take 5 mg by mouth nightly as needed for Insomnia 11/13/2020 at PM Yes Reported, Patient   insulin pen needle (BD JAIME U/F) 32G X 4 MM miscellaneous    Reported, Patient       Date completed: 11/15/20    Medication history completed by: CHEPE Perez

## 2020-11-15 NOTE — H&P
[facility]     Cardiology History and Physical - Cards 2         Date of Admission: 11/14/2020    Assessment & Plan: HVSL    Mr. Tanner is a 65 year old male who has a past medical history significant for NICM LVEF 15-20% s/p LVAD HM3 2/18/20 c/b by MSSA driveline infection (11/5/20), moderate nonobstructive CAD, severe MR, HTN, ABHINAV (uses CPAP), DM2, CKD III, HLD, ANA, and prior tobacco use who was admitted directly from ID clinic with GPC bacteremia, likely MSSA.     Previous MSSA driveline infection, GPC on most recent culture   Diarrhea, resolved  Initially developed pain followed by drainage ~11/1, started on doxycycline on 11/5 but switched to TMP/SMX when the culture revealed MSSA resistant to doxy-subsequently. Blood cultures obtained which were negative. After starting TMP/SMX there was some improvement in the pain/drainage although not resolved. 11/14 directly admitted by ID after BC positive for GPC, likely MSSA. Discussed with ID who recommend repeat cultures, abdominal ultrasound of driveline site, and starting Cefazolin. Of note, patient did have a few days of diarrhea, resolved on day of admission. Hold of on stool testing for now.   - ID consulted, appreciate recs   - Ultrasound abdomen   - BCx2  - Stop bactrim (11/5 - 11/14)   - Start cefazolin 2g IV q12hrs per ID recs (11/14 - 11/*)    NICM LVEF 15-20%, NYHA III s/p HM III LVAD 2/2020  - ACEi/ARB: Lisinopril 10mg daily   - BB: Not on d/t RV dysfunction   - Aldosterone antagonist: Not on due to variable renal function  - SCD prophylaxis: s/p ICD stable lead parameters and normal device function   - Fluid status: Lasix 40mg twice daily    Atrial Fibrillation, VT/VF   S/p DCCV with some residual self-limiting AF.   - Amiodarone 200mg daily  - Warfarin, pharmacy to dose, hold given high INR  - Daily INR (goal 2-3)    Supratherapeutic INR  No symptoms/signs of active bleeding. Does not need vitamin K  at this time. Suspect elevated INR d/t polypharmacy including recent addition of TMP/SMX, which was held on admission.   - Hold warfarin and TMP/SMX  - Monitor for signs/symptoms of bleeding     CKD (chronic kidney disease) stage 4, GFR 15-29 ml/min (H) with progressive creatinine rise in last 1-2 months  Follows with nephrology as outpatient. CKD Pre-dated VAD. Initially d/t HTN +/- DM as this was more recently diagnosed. Also required iHD post cardiac arrest. HIV/HBV/HCV negative, SPEP negative, hemolysis unlikely. Previous UA non-nephritc. Renal/bladder ultrasound down as outpatient at outside clinic last week, awaiting results.   - F/u on OSH renal/bladder ultrasound results   - Avoid nephrotoxic agents  - Daily BMP   - Monitor I/O, electrolytes     Anemia  Multifactorial and d/t likely ACD and mild hemolysis from VAD. No active signs of bleeding.   - Daily HGB  - Transfuse <7g/dL      Diet: Regular  DVT Prophylaxis: Warfarin, held INR supratherapeutic   Code Status: Full  Fluids: PO  Lines: PIV, driveline     Disposition Plan   Expected discharge: 4 - 7 days, recommended to prior living arrangement once treatment of driveline infection.    Entered: Paulie Cool MD 11/14/2020, 7:54 PM     The patient's care will be discussed in AM.     Paulie Cool MD  [facility]   Please see sign in/sign out for up to date coverage information  ______________________________________________________________________    Chief Complaint   Driveline infection     History is obtained from the patient and chart review.     History of Present Illness   Mr. Tanner is a 65 year old male who has a past medical history significant for NICM LVEF 15-20% s/p LVAD HM3 2/18/20 c/b by MSSA driveline infection (11/5/20), moderate nonobstructive CAD, severe MR, HTN, ABHINAV (uses CPAP), DM2, CKD III, HLD, ANA, and prior tobacco use who was admitted directly from ID clinic with GPC  bacteremia, likely MSSA.     Patient reports increased abdominal pain, distension, and purulent drainage from driveline site starting a couple weeks ago. Was diagnosed with MSSA driveline infection and started on TMP/SMX on 11/5/20. Symptoms of abdominal pain, distension, and purulent drainage improving with abx treatment; however, repeat BC grew out GPC on 11/14 prompting readmission for likely MSSA bacteremia. Patient denies fevers but does report some chills. He did have a few days of water diarrhea but this resolved this morning. Also notes weight is about 5 ib up over the last couple weeks. Has continued to have normal urine output per his estimates. Breathing has been a bit worse than normal and appetite decreased. No cough or chest pain. No LVAD alarms. No N/V. No lightheadedness, dizziness, syncope, new numbness/weakness, HA, speech or vision changes. No hematuria, black or bloody stools, or other bleeding. He expresses no other concerns at this time.     Review of Systems    The 10 point Review of Systems is negative other than noted in the HPI or here.     Past Medical History    I have reviewed this patient's medical history and updated it with pertinent information if needed.   No past medical history on file.    Past Surgical History   I have reviewed this patient's surgical history and updated it with pertinent information if needed.  Past Surgical History:   Procedure Laterality Date     ANESTHESIA CARDIOVERSION N/A 2/28/2020    Procedure: ANESTHESIA, FOR CARDIOVERSION;  Surgeon: GENERIC ANESTHESIA PROVIDER;  Location: [facility] OR     CV CENTRAL VENOUS CATHETER PLACEMENT N/A 2/13/2020    Procedure: Central Venous Catheter Placement;  Surgeon: Chente Moss MD;  Location:  HEART CARDIAC CATH LAB     CV INTRA-AORTIC BALLOON PUMP INSERTION N/A 2/7/2020    Procedure: Intra-Aortic Balloon Pump Insertion;  Surgeon: Jose Baldwin MD;  Location:  HEART CARDIAC CATH LAB     CV INTRA-AORTIC  BALLOON PUMP INSERTION N/A 2020    Procedure: Intra-Aortic Balloon Pump Insertion;  Surgeon: Chente Moss MD;  Location:  HEART CARDIAC CATH LAB     CV RIGHT HEART CATH N/A 2020    Procedure: CV RIGHT HEART CATH;  Surgeon: Dalton Baeza MD;  Location:  HEART CARDIAC CATH LAB     CV SWAN LUCIANA PROCEDURE N/A 2020    Procedure: [place] Luciana Procedure;  Surgeon: Chente Moss MD;  Location:  HEART CARDIAC CATH LAB     EP ICD Bilateral 3/16/2020    Procedure: EP ICD;  Surgeon: Dali Day MD;  Location:  HEART CARDIAC CATH LAB     INSERT VENTRICULAR ASSIST DEVICE LEFT (HEARTMATE II) N/A 2020    Procedure: INSERTION, LEFT VENTRICULAR ASSIST DEVICE (HEARTMATE III);  Surgeon: Mac Jaramillo MD;  Location:  OR     THORACOSCOPY Right 3/6/2020    Procedure: RIGHT VIDEO-ASSISTED THORASCOPIC SURGERY, EVACUATION OF HEMOTHORAX, PLACEMENT OF CHEST TUBES;  Surgeon: William Gan MD;  Location:  OR     Social History   I have reviewed this patient's social history and updated it with pertinent information if needed.  Social History     Tobacco Use     Smoking status: Former Smoker     Quit date: 1994     Years since quittin.6     Smokeless tobacco: Never Used   Substance Use Topics     Alcohol use: Not on file     Drug use: Not on file     Family History   I have reviewed this patient's family history and updated it with pertinent information if needed.     Prior to Admission Medications   Prior to Admission Medications   Prescriptions Last Dose Informant Patient Reported? Taking?   FLUoxetine (PROZAC) 20 MG capsule  Self No No   Sig: Take 1 capsule (20 mg) by mouth daily   MULTIPLE MINERALS-VITAMINS PO  Self Yes No   Sig: Take 1 tablet by mouth daily   acetaminophen (TYLENOL) 325 MG tablet  Self No No   Sig: Take 2 tablets (650 mg) by mouth every 4 hours as needed for mild pain or fever   albuterol (PROAIR HFA/PROVENTIL HFA/VENTOLIN HFA) 108 (90  Base) MCG/ACT inhaler  Self No No   Sig: Inhale 2 puffs into the lungs every 6 hours as needed for wheezing   Patient not taking: Reported on 2020   allopurinol (ZYLOPRIM) 100 MG tablet  Self No No   Si tablets (200 mg) by Oral or Feeding Tube route daily   amiodarone (PACERONE) 200 MG tablet  Self No No   Sig: Take 1 tablet (200 mg) by mouth daily   aspirin (ASA) 81 MG EC tablet  Self No No   Sig: Take 1 tablet (81 mg) by mouth daily   co-enzyme Q-10 200 MG CAPS  Self Yes No   Si mg by Oral or Feeding Tube route daily   diclofenac (VOLTAREN) 1 % topical gel  Self No No   Sig: Apply 4 g topically 4 times daily as needed for moderate pain   Patient not taking: Reported on 2020   furosemide (LASIX) 20 MG tablet  Self Yes No   Sig: Take 2 tablets (40 mg) by mouth daily   hydrALAZINE (APRESOLINE) 25 MG tablet  Self Yes No   Sig: Take 4 tablets (100 mg) by mouth 3 times daily   insulin glargine (LANTUS PEN) 100 UNIT/ML pen  Self No No   Sig: Inject 10 Units Subcutaneous At Bedtime   Patient not taking: Reported on 10/30/2020   insulin pen needle (BD JAIME U/F) 32G X 4 MM miscellaneous  Self Yes No   lisinopril (ZESTRIL) 10 MG tablet  Self Yes No   Sig: Take 1 tablet (10 mg) by mouth daily   magnesium oxide (MAG-OX) 400 MG tablet  Self No No   Si tablet (400 mg) by Oral or Feeding Tube route daily   meclizine (ANTIVERT) 25 MG tablet   No No   Sig: Take 0.5 tablets (12.5 mg) by mouth 3 times daily as needed for dizziness   melatonin 5 MG tablet  Self Yes No   Sig: Take 2 tablets (10 mg) by mouth nightly as needed for sleep   methocarbamol (ROBAXIN) 500 MG tablet  Self No No   Sig: Take 1 tablet (500 mg) by mouth 4 times daily as needed for muscle spasms   multivitamin w/minerals (THERA-VIT-M) tablet  Self No No   Sig: Take 1 tablet by mouth daily   ondansetron (ZOFRAN) 4 MG tablet  Self Yes No   Sig: Take 4 mg by mouth every 4 hours as needed for nausea   pantoprazole (PROTONIX) 40 MG EC tablet  Self  No No   Sig: Take 1 tablet (40 mg) by mouth every morning (before breakfast)   Patient not taking: Reported on 10/30/2020   potassium chloride ER (KLOR-CON M) 10 MEQ CR tablet  Self No No   Sig: Take 1 tablet (10 mEq) by mouth daily   sulfamethoxazole-trimethoprim (BACTRIM) 400-80 MG tablet   Yes No   Sig: TAKE 1 TABLET BY MOUTH TWICE DAILY FOR FOURTEEN DAYS (STOP TAKING DOXYCYCLINE)   warfarin ANTICOAGULANT (COUMADIN) 4 MG tablet  Self No No   Sig: Take 4-8mg daily or as directed by the coumadin clinic   zolpidem (AMBIEN) 5 MG tablet  Self Yes No   Sig: Take 5 mg by mouth nightly as needed for Insomnia      Facility-Administered Medications: None     Allergies   Allergies   Allergen Reactions     Heparin      HIT screen positive 2/14/20, reflex DAVINA negative; however heme recommended treating as if positive  HIT screen negative 2/11/20     Oxycodone Itching and Other (See Comments)     Chlorhexidine Rash     Physical Exam   Vital Signs: Temp: 97.9  F (36.6  C) Temp src: Oral BP: (!) 115/90 Pulse: 110   Resp: 18 SpO2: 97 % O2 Device: None (Room air)    Weight: 220 lbs 0 oz     LVAD: flow 4.4, PI 3.9, speed 5500, power 4.3    General: Well-nourished, no acute distress, sitting upright, speaking in complete sentences, appropriate   Eyes: PERRL, conjunctivae pink no scleral icterus, left lateral conjunctival injection  ENT: Moist mucus membranes, posterior oropharynx clear without erythema or exudates  Respiratory: Lungs clear to auscultation bilaterally, no crackles/rubs/wheezes. Good air movement  CV: Continues flow murmur, no LE/sacral edema, JVP 12 cm  GI:  Abdomen distended. Clean dressing over driveline, no surrounding erythema. Nontender to palpation. No guarding or rebound.   Skin: Warm, dry. No rashes or petechiae  Musculoskeletal: No peripheral edema or calf tenderness  Neuro: Alert and oriented to person/place/time  Psychiatric: Normal affect    Data   Data reviewed today: I reviewed all medications, new labs  and imaging results over the last 24 hours.

## 2020-11-15 NOTE — PROGRESS NOTES
"SPIRITUAL HEALTH SERVICES: Tele-Encounter  Patient Location ([facility], [facility], Unit): [facility], [place],   Spoke with (patient, family relationship): Pt Eliseo      Referral Source:  request upon admissions.    If applicable: patient was appropriately screened for telechaplaincy support with bedside nurse prior to visit (e.g. Mental Health and Addiction contexts). See call details below.    DATA:  Pt Bon consented to a televisit with me today. He asked me for prayers via [employer] tele-call for \"healing and for my family\". We prayed together at his request.     Eliseo noted that, \"I will only be here for a couple of days\" and did not expect follow up from [facility] due to a possible discharge.       PLAN:  Unit  will be notified for follow up. [facility] is available to Eliseo per request.     SUNIL Ferguson    ______________________________    Type of service:  Telephone Visit     has received verbal consent for a TelephoneVisit from the patient? Yes    Distance Provider Location: designated [place] office or home office (secure setting)    Mode of Communication: telephone (via [employer] phone or [employer] tele-call-number ([phone]))      "

## 2020-11-15 NOTE — PROGRESS NOTES
Admission          11/14/2020  7:42 PM  -----------------------------------------------------------  Diagnosis: driveline infection     Admitted from: home  Via: wheelcahir  Accompanied by: family  Family Aware of Admission: Yes  Belongings: remain with pt (Electric shaver, C-pap machine , cell phone w/ , glasses, pair shoes, hat, coat, 2 pair pj pants, 3 pairs of socks, underwear 2 battery bags and a shower bag, and a pillow, watch)  Admission Profile: complete  Teaching: orientation to unit, call don't fall, use of console, meal times, visiting hours,  when to call for the RN (angina/sob/dizzyness, etc.), and enforced importance of safety   Access: L PIV SL  Telemetry: Placed on pt  Ht./Wt.: complete    Two person skin assessment completed with Mu MO RN. No significant findings. HM3 driveline from R abdomen covered with dressing, CDI.     Temp:  [97.9  F (36.6  C)-98.7  F (37.1  C)] 98.7  F (37.1  C)  Pulse:  [109-112] 109  Resp:  [18] 18  BP: ()/(87-90) 98/87  SpO2:  [96 %-97 %] 96 %

## 2020-11-16 ENCOUNTER — DOCUMENTATION ONLY (OUTPATIENT)
Dept: ANTICOAGULATION | Facility: CLINIC | Age: 65
End: 2020-11-16

## 2020-11-16 ENCOUNTER — APPOINTMENT (OUTPATIENT)
Dept: CARDIOLOGY | Facility: CLINIC | Age: 65
DRG: 314 | End: 2020-11-16
Attending: STUDENT IN AN ORGANIZED HEALTH CARE EDUCATION/TRAINING PROGRAM
Payer: MEDICARE

## 2020-11-16 DIAGNOSIS — Z95.811 LVAD (LEFT VENTRICULAR ASSIST DEVICE) PRESENT (H): ICD-10-CM

## 2020-11-16 DIAGNOSIS — I50.22 CHRONIC SYSTOLIC CONGESTIVE HEART FAILURE (H): ICD-10-CM

## 2020-11-16 LAB
ANION GAP SERPL CALCULATED.3IONS-SCNC: 8 MMOL/L (ref 3–14)
BUN SERPL-MCNC: 30 MG/DL (ref 7–30)
CALCIUM SERPL-MCNC: 8.2 MG/DL (ref 8.5–10.1)
CHLORIDE SERPL-SCNC: 100 MMOL/L (ref 94–109)
CO2 SERPL-SCNC: 25 MMOL/L (ref 20–32)
CREAT SERPL-MCNC: 1.66 MG/DL (ref 0.66–1.25)
ERYTHROCYTE [DISTWIDTH] IN BLOOD BY AUTOMATED COUNT: 19.9 % (ref 10–15)
FERRITIN SERPL-MCNC: 75 NG/ML (ref 26–388)
GFR SERPL CREATININE-BSD FRML MDRD: 42 ML/MIN/{1.73_M2}
GLUCOSE BLDC GLUCOMTR-MCNC: 111 MG/DL (ref 70–99)
GLUCOSE BLDC GLUCOMTR-MCNC: 123 MG/DL (ref 70–99)
GLUCOSE BLDC GLUCOMTR-MCNC: 141 MG/DL (ref 70–99)
GLUCOSE BLDC GLUCOMTR-MCNC: 176 MG/DL (ref 70–99)
GLUCOSE BLDC GLUCOMTR-MCNC: 188 MG/DL (ref 70–99)
GLUCOSE SERPL-MCNC: 113 MG/DL (ref 70–99)
GRAM STN SPEC: NORMAL
GRAM STN SPEC: NORMAL
HCT VFR BLD AUTO: 32.7 % (ref 40–53)
HGB BLD-MCNC: 9.7 G/DL (ref 13.3–17.7)
INR PPP: 4.38 (ref 0.86–1.14)
IRON SATN MFR SERPL: 6 % (ref 15–46)
IRON SERPL-MCNC: 16 UG/DL (ref 35–180)
Lab: NORMAL
MCH RBC QN AUTO: 19.9 PG (ref 26.5–33)
MCHC RBC AUTO-ENTMCNC: 29.7 G/DL (ref 31.5–36.5)
MCV RBC AUTO: 67 FL (ref 78–100)
PLATELET # BLD AUTO: 464 10E9/L (ref 150–450)
POTASSIUM SERPL-SCNC: 3.8 MMOL/L (ref 3.4–5.3)
RBC # BLD AUTO: 4.88 10E12/L (ref 4.4–5.9)
SODIUM SERPL-SCNC: 134 MMOL/L (ref 133–144)
SPECIMEN SOURCE: NORMAL
TIBC SERPL-MCNC: 261 UG/DL (ref 240–430)
WBC # BLD AUTO: 15.7 10E9/L (ref 4–11)

## 2020-11-16 PROCEDURE — 87186 SC STD MICRODIL/AGAR DIL: CPT | Performed by: STUDENT IN AN ORGANIZED HEALTH CARE EDUCATION/TRAINING PROGRAM

## 2020-11-16 PROCEDURE — 93306 TTE W/DOPPLER COMPLETE: CPT

## 2020-11-16 PROCEDURE — 87040 BLOOD CULTURE FOR BACTERIA: CPT | Performed by: INTERNAL MEDICINE

## 2020-11-16 PROCEDURE — 99233 SBSQ HOSP IP/OBS HIGH 50: CPT | Performed by: INTERNAL MEDICINE

## 2020-11-16 PROCEDURE — 999N001017 HC STATISTIC GLUCOSE BY METER IP

## 2020-11-16 PROCEDURE — 87040 BLOOD CULTURE FOR BACTERIA: CPT | Performed by: STUDENT IN AN ORGANIZED HEALTH CARE EDUCATION/TRAINING PROGRAM

## 2020-11-16 PROCEDURE — 250N000013 HC RX MED GY IP 250 OP 250 PS 637: Performed by: STUDENT IN AN ORGANIZED HEALTH CARE EDUCATION/TRAINING PROGRAM

## 2020-11-16 PROCEDURE — 83540 ASSAY OF IRON: CPT | Performed by: STUDENT IN AN ORGANIZED HEALTH CARE EDUCATION/TRAINING PROGRAM

## 2020-11-16 PROCEDURE — 82728 ASSAY OF FERRITIN: CPT | Performed by: STUDENT IN AN ORGANIZED HEALTH CARE EDUCATION/TRAINING PROGRAM

## 2020-11-16 PROCEDURE — 87205 SMEAR GRAM STAIN: CPT | Performed by: STUDENT IN AN ORGANIZED HEALTH CARE EDUCATION/TRAINING PROGRAM

## 2020-11-16 PROCEDURE — 85610 PROTHROMBIN TIME: CPT | Performed by: STUDENT IN AN ORGANIZED HEALTH CARE EDUCATION/TRAINING PROGRAM

## 2020-11-16 PROCEDURE — 83550 IRON BINDING TEST: CPT | Performed by: STUDENT IN AN ORGANIZED HEALTH CARE EDUCATION/TRAINING PROGRAM

## 2020-11-16 PROCEDURE — 93306 TTE W/DOPPLER COMPLETE: CPT | Mod: 26 | Performed by: INTERNAL MEDICINE

## 2020-11-16 PROCEDURE — 87070 CULTURE OTHR SPECIMN AEROBIC: CPT | Performed by: STUDENT IN AN ORGANIZED HEALTH CARE EDUCATION/TRAINING PROGRAM

## 2020-11-16 PROCEDURE — 250N000011 HC RX IP 250 OP 636: Performed by: INTERNAL MEDICINE

## 2020-11-16 PROCEDURE — 36415 COLL VENOUS BLD VENIPUNCTURE: CPT | Performed by: STUDENT IN AN ORGANIZED HEALTH CARE EDUCATION/TRAINING PROGRAM

## 2020-11-16 PROCEDURE — 36415 COLL VENOUS BLD VENIPUNCTURE: CPT | Performed by: INTERNAL MEDICINE

## 2020-11-16 PROCEDURE — 214N000001 HC R&B CCU UMMC

## 2020-11-16 PROCEDURE — 80048 BASIC METABOLIC PNL TOTAL CA: CPT | Performed by: STUDENT IN AN ORGANIZED HEALTH CARE EDUCATION/TRAINING PROGRAM

## 2020-11-16 PROCEDURE — 87077 CULTURE AEROBIC IDENTIFY: CPT | Performed by: STUDENT IN AN ORGANIZED HEALTH CARE EDUCATION/TRAINING PROGRAM

## 2020-11-16 PROCEDURE — 99233 SBSQ HOSP IP/OBS HIGH 50: CPT | Mod: 25 | Performed by: INTERNAL MEDICINE

## 2020-11-16 PROCEDURE — 85027 COMPLETE CBC AUTOMATED: CPT | Performed by: STUDENT IN AN ORGANIZED HEALTH CARE EDUCATION/TRAINING PROGRAM

## 2020-11-16 PROCEDURE — 93750 INTERROGATION VAD IN PERSON: CPT | Performed by: INTERNAL MEDICINE

## 2020-11-16 RX ORDER — BENZONATATE 100 MG/1
100 CAPSULE ORAL 3 TIMES DAILY PRN
Status: DISCONTINUED | OUTPATIENT
Start: 2020-11-16 | End: 2020-11-19 | Stop reason: HOSPADM

## 2020-11-16 RX ADMIN — MULTIPLE VITAMINS W/ MINERALS TAB 1 TABLET: TAB at 08:20

## 2020-11-16 RX ADMIN — HYDRALAZINE HYDROCHLORIDE 100 MG: 100 TABLET, FILM COATED ORAL at 21:04

## 2020-11-16 RX ADMIN — BUMETANIDE 2 MG: 2 TABLET ORAL at 08:21

## 2020-11-16 RX ADMIN — FLUOXETINE 20 MG: 20 CAPSULE ORAL at 08:21

## 2020-11-16 RX ADMIN — CEFAZOLIN SODIUM 2 G: 2 INJECTION, SOLUTION INTRAVENOUS at 02:09

## 2020-11-16 RX ADMIN — ZOLPIDEM TARTRATE 5 MG: 5 TABLET ORAL at 21:04

## 2020-11-16 RX ADMIN — HYDRALAZINE HYDROCHLORIDE 100 MG: 100 TABLET, FILM COATED ORAL at 15:13

## 2020-11-16 RX ADMIN — ASPIRIN 81 MG: 81 TABLET, DELAYED RELEASE ORAL at 08:21

## 2020-11-16 RX ADMIN — AMIODARONE HYDROCHLORIDE 200 MG: 200 TABLET ORAL at 08:20

## 2020-11-16 RX ADMIN — CEFAZOLIN SODIUM 2 G: 2 INJECTION, SOLUTION INTRAVENOUS at 11:06

## 2020-11-16 RX ADMIN — METHOCARBAMOL 500 MG: 500 TABLET, FILM COATED ORAL at 15:08

## 2020-11-16 RX ADMIN — CEFAZOLIN SODIUM 2 G: 2 INJECTION, SOLUTION INTRAVENOUS at 17:22

## 2020-11-16 RX ADMIN — BENZONATATE 100 MG: 100 CAPSULE ORAL at 14:53

## 2020-11-16 RX ADMIN — MAGNESIUM OXIDE 400 MG: 400 TABLET ORAL at 08:20

## 2020-11-16 RX ADMIN — INSULIN ASPART 1 UNITS: 100 INJECTION, SOLUTION INTRAVENOUS; SUBCUTANEOUS at 17:23

## 2020-11-16 RX ADMIN — LISINOPRIL 10 MG: 10 TABLET ORAL at 08:21

## 2020-11-16 RX ADMIN — INSULIN GLARGINE 10 UNITS: 100 INJECTION, SOLUTION SUBCUTANEOUS at 21:05

## 2020-11-16 RX ADMIN — ALLOPURINOL 200 MG: 100 TABLET ORAL at 08:21

## 2020-11-16 RX ADMIN — BUMETANIDE 2 MG: 2 TABLET ORAL at 14:53

## 2020-11-16 RX ADMIN — HYDRALAZINE HYDROCHLORIDE 100 MG: 100 TABLET, FILM COATED ORAL at 08:20

## 2020-11-16 ASSESSMENT — ACTIVITIES OF DAILY LIVING (ADL)
ADLS_ACUITY_SCORE: 14

## 2020-11-16 ASSESSMENT — MIFFLIN-ST. JEOR: SCORE: 1713.4

## 2020-11-16 NOTE — PROGRESS NOTES
Received fax from [facility] with supra-therapeutic INR results from 11/14 8.2. Pt is currently in the hospital and will follow-up with pt once discharged. Updated the calendar.

## 2020-11-16 NOTE — PROGRESS NOTES
Care Management Discharge Note    Discharge Date: 11/17/20       Discharge Disposition:  TBD    Discharge Services:  TBD    Discharge DME:  TBD    Discharge Transportation:  TBD    Additional Information:  Pt status reviewed/discussed during care team rounds.  Team anticipates pt may require IV antibiotics when cleared for discharge.      Per home infusion protocol benefit check/request email sent to [facility].       CM/SW will assess when appropriate.     11/17 0950: Pt has Medicare and [employer] Senior Gold, which doesn t cover IV ABX in the home, but would in the IC or short term TCU  Ancef 2g q8h=$37.92 (per day)  if not homebound nursing would be $90 (per visit).          Sarah Houser, RN

## 2020-11-16 NOTE — PROGRESS NOTES
[facility]     Cardiology Progress Note- Cards 2        Date of Admission:  11/14/2020     Assessment & Plan: HVSL   Mr. Tanner is a 65 year old male who has a past medical history significant for NICM LVEF 15-20% s/p LVAD HM3 2/18/20 c/b by MSSA driveline infection (11/5/20), moderate nonobstructive CAD, severe MR, HTN, ABHINAV (uses CPAP), DM2, CKD III, HLD, ANA, and prior tobacco use who was admitted directly from ID clinic with GPC bacteremia, likely MSSA.     Changes today:  - BCx2 ordered for 11/17 (collecting daily bcx until negative for 48 hrs, transplant ID following)  - Continue cefazolin 2g IV q12hrs   - ordered iron studies    #Previous MSSA driveline infection  #Staphylococcus aureus Bacteremia (Likely MSSA)  #Diarrhea, resolved  Initially developed pain followed by drainage ~11/1, started on doxycycline on 11/5 but switched to TMP/SMX when the culture revealed MSSA resistant to doxy-subsequently. Blood cultures obtained which were negative. After starting TMP/SMX there was some improvement in the pain/drainage although not resolved.1/14 directly admitted by ID after BC positive for GPC, likely MSSA. Discussed with transplant ID who recommend repeat blood cultures, abdominal ultrasound of driveline site, and starting Cefazolin. Of note, patient did have a few days of diarrhea which had improved on day of admission and resolved since 11/15 (C.diff negative).    Most recently, abdominal imaging (US and CT abd/pelvis) revealed no intraabdominal fluid collection concerning for abscess/infectious process. TTE did not comment on vegetation. We do not feel strongly about CHAMP.    - Transplant ID following, appreciate recs   - BCx2 ordered for 11/17  - BCx2- NGTD (pending)    > Repeat until negative for 48 hrs, then can place PICC line  - Stopped bactrim (11/5 - 11/14)   - Started Cefazolin 2g IV q12hrs per ID recs (11/14 - 11/*)  - C.diff negative    #NICM LVEF  15-20%, NYHA III s/p HM III LVAD 2/2020  - ACEi/ARB: Lisinopril 10mg daily   - Fluid status: Hypervolemic     > Diuresis: stopped PTA Furosemide 40 mg daily (continue Bumetanide 2mg per oral BID)  - Afterload reduction: Hydralazine 100mg per oral TID    >> Will likely require more MAP control on discharge (ie. Amlodipine)   - BB: Not on d/t RV dysfunction   - Aldosterone antagonist: Not on due to variable renal function  - SCD prophylaxis: s/p ICD stable lead parameters and normal device function   - Fluid status: Hypervolemic     #Atrial Fibrillation, VT/VF     S/p DCCV with some residual self-limiting AF.   - Amiodarone 200mg daily  - Warfarin, pharmacy to dose, hold given high INR  - Daily INR (goal 2-3)    #Supratherapeutic INR    > Multifactorial    No symptoms/signs of active bleeding. Suspect elevated INR d/t polypharmacy including recent addition of TMP/SMX/Amiodarone and poor appetite.  INR continues to trend down after giving 1x PO vit K, INR on 11/16 4.38.  - Hold warfarin and TMP/SMX  - s/p one time dose of 1mg of oral Vitamin K  - Monitor for signs/symptoms of bleeding   - Monitor INR daily    #CKD (chronic kidney disease) stage 4, GFR 15-29 ml/min (H) with progressive creatinine rise in last 1-2 months  Follows with nephrology as outpatient. CKD Pre-dated VAD. Initially d/t HTN +/- DM as this was more recently diagnosed. Also required iHD post cardiac arrest. HIV/HBV/HCV negative, SPEP negative, hemolysis unlikely. Previous UA non-nephritc. Renal/bladder ultrasound done as outpatient at outside clinic last week, awaiting results, has another scheduled for 12/15/2020. To this day, no clear etiology has been identified. Nephrology thinks that perhaps this new Cr increase may be due to the following possibilities: HTN pre-dating VAD+needing HD for BERNARDA s/p MI. There may be a component of increased systemic vascular resistance promoting a pathological process within the spectrum of cardiorenal etiologies.  Will continue to optimize afterload reducing medications.   - F/u on OSH renal/bladder ultrasound results   - Avoid nephrotoxic agents  - Daily BMP   - Monitor I/O, electrolytes     #Anemia  Multifactorial and d/t likely ACD and mild hemolysis from VAD. There is a ~40% increased risk of GI bleeding within 2 years of LVAD placement. No active signs of bleeding.   - Daily HGB  - Transfuse <7g/dL  - ordered iron studies     # Diabetes Mellitus type II  - sliding scale insulin  - 10 units of glargine  at bedtime     Diet: Regular  DVT Prophylaxis: Warfarin, held INR supratherapeutic   Code Status: Full  Fluids: PO  Lines: PIV, driveline      Diet:     DVT Prophylaxis: Warfarin  Guevara Catheter: not present  Code Status:         Disposition Plan   Expected discharge: 4 - 7 days, recommended to prior living arrangement once Infectious Work-up completed and treatment plan established; pending clinical stability.      Entered: Jess Meraz MD 11/16/2020, 1:37 PM       The patient's care was discussed with the Attending Physician, Dr. Nader Cisneros MD PhD.      Jses Meraz MD  PGY2   resident  [phone]  ______________________________________________________________________    Interval History   NAEON. Nursing notes reviewed. Pt denied SOB, chest pain, fevers, chills. Denied bleeding, bloody stools.     Data reviewed today: I reviewed all medications, new labs and imaging results over the last 24 hours. I personally reviewed no imaging or EKG's to review for today.     Physical Exam   Vital Signs: Temp: 98.6  F (37  C) Temp src: Oral BP: 97/75 Pulse: 115   Resp: 18 SpO2: 93 % O2 Device: None (Room air)    Weight: 216 lbs 0 oz  General: Well-nourished, no acute distress, lying in bed, speaking in complete sentences, appropriate   Eyes: PERRL, conjunctivae pink no scleral icterus  ENT: Moist mucus membranes  Respiratory: Lungs clear to auscultation bilaterally, no crackles/rubs/wheezes. Good air movement  CV: Continues  flow murmur, no LE/sacral edema  GI:  Abdomen distended. Clean dressing over driveline, no surrounding erythema. Nontender to palpation. No guarding or rebound.   Skin: Warm, dry. No rashes or petechiae  Musculoskeletal: No peripheral edema or calf tenderness  Neuro: Alert and oriented to person/place/time  Psychiatric: Normal affect    Data      TTE 11/16/2020  Interpretation Summary  Technically difficult study.Extremely poor acoustic windows.  Limited information was obtained during study.  LVAD cannula was seen in the expected anatomic position in the LV apex.  HM3 at 5500RPM.  LVIDd 48mm.  Septum probably normal.  Aortic valve cannot be assessed.  Flow velocities not obtained.  Dilation of the inferior vena cava is present with abnormal respiratory  variation in diameter.  No pericardial effusion is present.      Results for orders placed or performed during the hospital encounter of 11/14/20   US Abdomen Complete     Status: None    Narrative    EXAMINATION: US ABDOMEN COMPLETE, 11/15/2020 9:20 AM     COMPARISON: 2/8/2020    HISTORY: Concern for abscess, patient unable to get CT scan due to  elevated creatinine    TECHNIQUE: The abdomen was scanned in standard fashion with  specialized ultrasound transducer(s) using both gray-scale and limited  color Doppler techniques.    FINDINGS:    Liver: The liver demonstrates normal homogeneous echotexture. The  liver measures 19.7 cm in craniocaudal dimension. No evidence of a  focal hepatic mass. The main portal vein is patent with antegrade  flow. Gallbladder: There is no wall thickening, positive sonographic  Yoon's sign or evidence for cholelithiasis.    Bile Ducts: Both the intra- and extrahepatic biliary system are of  normal caliber. The common bile duct measures 3.7 mm in diameter.    Pancreas: Not well visualized.    Kidneys: Both kidneys are of normal echotexture, without mass or  hydronephrosis. The craniocaudal dimensions are: right- 10.5,  left-  11.5.    Spleen: The spleen is normal in size, measuring 11.1 in sagittal  dimension.    Aorta and IVC: The visualized portions of the aorta and IVC are  unremarkable. The proximal aorta measures 2.7 cm in diameter and the  IVC measures 2.4 cm in diameter.    Fluid: Small volume of ascites. Partially imaged right pleural  effusion.        Impression    IMPRESSION:   1. Hepatomegaly with small volume ascites. No intra-abdominal abscess  visualized. If there is continued clinical concern for abscess,  consider noncontrast CT scan of the abdomen/pelvis.  2. Partially imaged right pleural effusion, likely trace.    I have personally reviewed the examination and initial interpretation  and I agree with the findings.    DEION FLANAGAN MD   CT Abdomen Pelvis w/o Contrast     Status: None    Narrative    EXAMINATION: CT ABDOMEN PELVIS W/O CONTRAST, 11/15/2020 2:53 PM    TECHNIQUE:  Helical CT images from the lung bases through the pubic  symphysis were obtained  without IV contrast.     COMPARISON: Same-day ultrasound, CT 2/8/2020    HISTORY: Abd pain, fever, abscess suspected; Patient with VAD and  driveline infection (S. aureus infection/MSSA) and now complicated  with bacteremia, please evaluate for abscesses/intraabdominal  infectious process/cutaneous-soft tissues skin infections    FINDINGS:    Abdomen and pelvis:     Homogenous hepatic parenchyma, without focal liver lesion. No  calcified gallstones. No intra or extrahepatic biliary dilatation.  Moderate fatty replacement of the pancreatic parenchyma, without focal  pancreatic lesion or ductal dilatation. Unremarkable spleen. Mild  thickening of the adrenal glands bilaterally, without focal nodule,  similar to prior. Unremarkable noncontrast appearance of the kidneys,  without hydronephrosis or nephrolithiasis. The urinary bladder is  distended and otherwise unremarkable. Unremarkable prostate and  seminal vesicles.    No abnormally dilated loop of small or  large bowel. Small to moderate  volume simple ascites within the abdomen and pelvis, slightly  increased in comparison to CT performed in February. No defined fluid  collection is noted to suggest abscess. No intraperitoneal free air is  noted. Small fat-containing umbilical and left inguinal hernias.  Vascular patency cannot be assessed on these noncontrast images,  however there is no evidence of infrarenal abdominal aortic aneurysm.  No pathologically enlarged thoracic lymph nodes.    Lung bases:  Partially visualized postsurgical changes of LVAD  placement. Patency cannot be assessed on these noncontrast images. No  air or fluid collection along the drive line. Trace bilateral pleural  effusions and atelectatic changes, otherwise the lung bases are clear.  Stable cardiomegaly. Small sliding type hiatal hernia.    Bones and soft tissues: No acute or aggressive appearing osseous  lesion. Stepwise grade 1 anterolisthesis of L3 on L4, and L4 on L5,  with associated loss of intervertebral disc space at each level.  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 Mild diffuse body  wall edema.      Impression    IMPRESSION:   1. Small to moderate volume simple intra-abdominal ascites, slightly  increased in volume as compared to CT performed on 2/8/2020,  compatible with third spacing of fluid. No defined fluid collection is  identified to suggest abscess. No additional acute findings within the  abdomen/pelvis.  2. Trace bilateral pleural effusions, and associated bibasilar  atelectasis. The lung bases are otherwise clear.  3. Postprocedural changes of LVAD placement. The components are  unremarkable on these noncontrast images, without associated air or  inflammatory stranding to suggest drive line infection.     I have personally reviewed the examination and initial interpretation  and I agree with the findings.    OSITO BRAVO MD   Basic metabolic panel     Status: Abnormal   Result Value Ref Range    Sodium  134 133 - 144 mmol/L    Potassium 4.9 3.4 - 5.3 mmol/L    Chloride 102 94 - 109 mmol/L    Carbon Dioxide 25 20 - 32 mmol/L    Anion Gap 7 3 - 14 mmol/L    Glucose 194 (H) 70 - 99 mg/dL    Urea Nitrogen 35 (H) 7 - 30 mg/dL    Creatinine 1.98 (H) 0.66 - 1.25 mg/dL    GFR Estimate 34 (L) >60 mL/min/[1.73_m2]    GFR Estimate If Black 39 (L) >60 mL/min/[1.73_m2]    Calcium 8.8 8.5 - 10.1 mg/dL   Hepatic panel     Status: Abnormal   Result Value Ref Range    Bilirubin Direct 0.4 (H) 0.0 - 0.2 mg/dL    Bilirubin Total 0.7 0.2 - 1.3 mg/dL    Albumin 2.6 (L) 3.4 - 5.0 g/dL    Protein Total 7.8 6.8 - 8.8 g/dL    Alkaline Phosphatase 184 (H) 40 - 150 U/L    ALT 56 0 - 70 U/L    AST 48 (H) 0 - 45 U/L   INR     Status: Abnormal   Result Value Ref Range    INR 6.21 (HH) 0.86 - 1.14   CBC with platelets     Status: Abnormal   Result Value Ref Range    WBC 18.4 (H) 4.0 - 11.0 10e9/L    RBC Count 5.21 4.4 - 5.9 10e12/L    Hemoglobin 10.5 (L) 13.3 - 17.7 g/dL    Hematocrit 35.7 (L) 40.0 - 53.0 %    MCV 69 (L) 78 - 100 fl    MCH 20.2 (L) 26.5 - 33.0 pg    MCHC 29.4 (L) 31.5 - 36.5 g/dL    RDW 20.7 (H) 10.0 - 15.0 %    Platelet Count 430 150 - 450 10e9/L   Asymptomatic COVID-19 Virus (Coronavirus) by PCR     Status: None    Specimen: Nasopharyngeal   Result Value Ref Range    COVID-19 Virus PCR to [facility] - Source Nasopharyngeal     COVID-19 Virus PCR to [facility] - Result       Test received-See reflex to IDDL test SARS CoV2 (COVID-19) Virus RT-PCR   Glucose by meter     Status: Abnormal   Result Value Ref Range    Glucose 190 (H) 70 - 99 mg/dL   INR     Status: Abnormal   Result Value Ref Range    INR 6.13 (HH) 0.86 - 1.14   Basic metabolic panel     Status: Abnormal   Result Value Ref Range    Sodium 133 133 - 144 mmol/L    Potassium 4.4 3.4 - 5.3 mmol/L    Chloride 102 94 - 109 mmol/L    Carbon Dioxide 21 20 - 32 mmol/L    Anion Gap 10 3 - 14 mmol/L    Glucose 143 (H) 70 - 99 mg/dL    Urea Nitrogen 31 (H) 7 - 30 mg/dL    Creatinine  1.78 (H) 0.66 - 1.25 mg/dL    GFR Estimate 39 (L) >60 mL/min/[1.73_m2]    GFR Estimate If Black 45 (L) >60 mL/min/[1.73_m2]    Calcium 8.5 8.5 - 10.1 mg/dL   Magnesium     Status: None   Result Value Ref Range    Magnesium 2.0 1.6 - 2.3 mg/dL   Glucose by meter     Status: Abnormal   Result Value Ref Range    Glucose 159 (H) 70 - 99 mg/dL   SARS-CoV-2 COVID-19 Virus (Coronavirus) RT-PCR Nasopharyngeal     Status: None    Specimen: Nasopharyngeal   Result Value Ref Range    SARS-CoV-2 Virus Specimen Source Nasopharyngeal     SARS-CoV-2 PCR Result NEGATIVE     SARS-CoV-2 PCR Comment       Testing was performed using the [employer] SARS-CoV-2 Assay on the [employer] Instrument System.   Additional information about this Emergency Use Authorization (EUA) assay can be found via   the Lab Guide.     CBC with platelets differential     Status: Abnormal   Result Value Ref Range    WBC 19.9 (H) 4.0 - 11.0 10e9/L    RBC Count 4.94 4.4 - 5.9 10e12/L    Hemoglobin 10.0 (L) 13.3 - 17.7 g/dL    Hematocrit 33.6 (L) 40.0 - 53.0 %    MCV 68 (L) 78 - 100 fl    MCH 20.2 (L) 26.5 - 33.0 pg    MCHC 29.8 (L) 31.5 - 36.5 g/dL    RDW 20.2 (H) 10.0 - 15.0 %    Platelet Count 418 150 - 450 10e9/L    Diff Method Automated Method     % Neutrophils 87.6 %    % Lymphocytes 4.0 %    % Monocytes 6.2 %    % Eosinophils 0.1 %    % Basophils 0.2 %    % Immature Granulocytes 1.9 %    Nucleated RBCs 0 0 /100    Absolute Neutrophil 17.4 (H) 1.6 - 8.3 10e9/L    Absolute Lymphocytes 0.8 0.8 - 5.3 10e9/L    Absolute Monocytes 1.2 0.0 - 1.3 10e9/L    Absolute Eosinophils 0.0 0.0 - 0.7 10e9/L    Absolute Basophils 0.0 0.0 - 0.2 10e9/L    Abs Immature Granulocytes 0.4 0 - 0.4 10e9/L    Absolute Nucleated RBC 0.0    Lactate Dehydrogenase     Status: None   Result Value Ref Range    Lactate Dehydrogenase 219 85 - 227 U/L   Glucose by meter     Status: Abnormal   Result Value Ref Range    Glucose 124 (H) 70 - 99 mg/dL   Lactic acid level STAT     Status: None   Result  Value Ref Range    Lactate for Sepsis Protocol 1.8 0.7 - 2.0 mmol/L   Glucose by meter     Status: Abnormal   Result Value Ref Range    Glucose 161 (H) 70 - 99 mg/dL   Glucose by meter     Status: Abnormal   Result Value Ref Range    Glucose 122 (H) 70 - 99 mg/dL   Glucose by meter     Status: Abnormal   Result Value Ref Range    Glucose 154 (H) 70 - 99 mg/dL   INR     Status: Abnormal   Result Value Ref Range    INR 4.38 (H) 0.86 - 1.14   Glucose by meter     Status: Abnormal   Result Value Ref Range    Glucose 123 (H) 70 - 99 mg/dL   Basic metabolic panel     Status: Abnormal   Result Value Ref Range    Sodium 134 133 - 144 mmol/L    Potassium 3.8 3.4 - 5.3 mmol/L    Chloride 100 94 - 109 mmol/L    Carbon Dioxide 25 20 - 32 mmol/L    Anion Gap 8 3 - 14 mmol/L    Glucose 113 (H) 70 - 99 mg/dL    Urea Nitrogen 30 7 - 30 mg/dL    Creatinine 1.66 (H) 0.66 - 1.25 mg/dL    GFR Estimate 42 (L) >60 mL/min/[1.73_m2]    GFR Estimate If Black 49 (L) >60 mL/min/[1.73_m2]    Calcium 8.2 (L) 8.5 - 10.1 mg/dL   CBC with platelets     Status: Abnormal   Result Value Ref Range    WBC 15.7 (H) 4.0 - 11.0 10e9/L    RBC Count 4.88 4.4 - 5.9 10e12/L    Hemoglobin 9.7 (L) 13.3 - 17.7 g/dL    Hematocrit 32.7 (L) 40.0 - 53.0 %    MCV 67 (L) 78 - 100 fl    MCH 19.9 (L) 26.5 - 33.0 pg    MCHC 29.7 (L) 31.5 - 36.5 g/dL    RDW 19.9 (H) 10.0 - 15.0 %    Platelet Count 464 (H) 150 - 450 10e9/L   Glucose by meter     Status: Abnormal   Result Value Ref Range    Glucose 111 (H) 70 - 99 mg/dL   Glucose by meter     Status: Abnormal   Result Value Ref Range    Glucose 141 (H) 70 - 99 mg/dL   Iron and iron binding capacity     Status: Abnormal   Result Value Ref Range    Iron 16 (L) 35 - 180 ug/dL    Iron Binding Cap 261 240 - 430 ug/dL    Iron Saturation Index 6 (L) 15 - 46 %   Ferritin     Status: None   Result Value Ref Range    Ferritin 75 26 - 388 ng/mL   Infectious Disease General Adult IP Consult: Patient to be seen: Routine within 24 hrs;  Call back #: Cards 2 [facility] phone [phone]; Driveline infection, followed by ID as outpatient, admitted as cultures grew GPC, please assist with abx recommendation...     Status: None ()    Negar Mason,      11/15/2020  3:03 PM  [facility]  Transplant Infectious Disease Consult Note - New Patient     Patient:  Eliseo Tanner, Date of birth 1955, Medical   record number [id number]  Date of Visit:  11/15/2020  Consult requested by Dr. Nader Cisneros for evaluation of MSSA   Bacteremia         Assessment and Recommendations:   Assessment:  63 y/o male with chronic systolic HF and NICM s/p HM 3 on   2/18/2020 c/b polymicrobial bacteremia (P mirabilis and E   faecalis), ABHINAV on CPAP, type II DM, CKD stage III, MSSA exit   drive line infection who has developed MSSA bacteremia.      1. Complicated MSSA bacteremia w/ associated LVAD drive line   infection: developed pain around exit site followed by drainage~   11/1. Initially started on doxycycline 11/5 but switched to   tmp/smx when the culture revealed MSSA resistant to   doxy-subsequently switched to tmp/smx 1 S.S. BID. Blood cultures   were initially negative but then on recheck 11/12 Staph aureus   growth was observed. Susceptibilities pending but presume this   will be MSSA since that is what his wound culture grew.    Abdominal US count not visualize an abscess. Given the MSSA will   need to complete a full evaluation especially given his LVAD and   ICD. Repeat blood cultures are pending. Of note does have   bilateral TKA but currently there is no erythema or swelling over   these areas.      2. Diarrhea: overall improved.     Historical Infectious Diseases Issues:  2/2020 P mirabilis and E faecalis bacteremia post VAD placement    Recommendations:  1. Blood cultures at [facility] are growing Staph   aureus-susceptibilities pending (#[phone])  2. Repeat blood cultures are pending  3. Please obtain a  CT abd/pelvis-contrast will provide better   visualization but understand that he has reduced creatinine   clearance so if needed w/o contrast is okay  4. Obtain a TTE-he does have a ICD in place so may need to   consider a CHAMP  5. Continue cefazolin 2 grams IV q 12 hours    Transplant Infectious Disease will continue to follow with you.    Negar Bhatti DO   Pager [phone]         History of Infectious Disease Illness:   HPI:  Mr. Tanner is a patient I saw in the outpatient clinic on 11/12.   HPI partially obtained from this note.       65 y/o male with chronic systolic HF and NICM s/p HM 3 on   2/18/2020 and ICD 3/2020, ABHINAV on CPAP, type II DM, CKD stage III,   MSSA LVAD exit site infection who has developed MSSA bacteremia.    Post LVAD placement he developed a retrosternal hematoma and   bleeding as well as Proteus mirabilis and E faecalis bacteremia   s/p 2 weeks of ampicillin and ceftriaxone. Also had an ICD placed   on 3/16/2020 for secondary SCD prevention.Also follows with   nephrology for renal disease and progressive creatinine increase.  On 11/4 patient called to report new redness, tenderness, bloody   drainage x 5 days at drive line site  On 11/5 he was started on doxycycline due to concerns for   infection. 11/15 culturegrew large amounts of MSSA (R: to   doxycycline). Blood culture negative.   On 11/6 he went to the ED for SOB.   On 11/8 doxycyline was changed to tmp/smx 1 S.S. BID x 14 days  After my clinic visit on 11/12 I obtained repeat blood cultures   which have down grown Staph aureus. Also found to have an   elevated INR.     Since our visit on 11/12 he had 1 day of diarrhea which has   improved. Denies fevers or chills. He thinks the drainage from   the LVAD site has improved. No erythema.  He has a ICD in place   and has not noticed increased erythema or swelling. Has chronic   left shoulder pain but no new pains in his joints or back.     Transplants:  Coordinator Kay  Valerie    Review of Systems: Remaining systems all reviewed and negative.  CONSTITUTIONAL:  No fevers or chills  EYES: negative for icterus or acute vision changes  ENT:  negative for acute hearing loss, tinnitus, sore throat  RESPIRATORY:  negative for cough, sputum or dyspnea  CARDIOVASCULAR:  negative for chest pain, palpitations  GASTROINTESTINAL:  negative for nausea, vomiting, diarrhea or   constipation  GENITOURINARY:  negative for dysuria or hematuria  HEME:  No easy bruising or bleeding  INTEGUMENT:  negative for rash or pruritus  NEURO:  Negative for headache or tremor      Past Medical History:   Diagnosis Date     Chronic systolic congestive heart failure (H)      History of implantable cardioverter-defibrillator (ICD)   placement      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)      LVAD (left ventricular assist device) present (H)        Past Surgical History:   Procedure Laterality Date     ANESTHESIA CARDIOVERSION N/A 2/28/2020    Procedure: ANESTHESIA, FOR CARDIOVERSION;  Surgeon: GENERIC   ANESTHESIA PROVIDER;  Location: [facility] OR     CV CENTRAL VENOUS CATHETER PLACEMENT N/A 2/13/2020    Procedure: Central Venous Catheter Placement;  Surgeon:   Chente Moss MD;  Location: [facility] CARDIAC   CATH LAB     CV INTRA-AORTIC BALLOON PUMP INSERTION N/A 2/7/2020    Procedure: Intra-Aortic Balloon Pump Insertion;  Surgeon:   Jose Baldwin MD;  Location: [facility] CARDIAC CATH LAB     CV INTRA-AORTIC BALLOON PUMP INSERTION N/A 2/13/2020    Procedure: Intra-Aortic Balloon Pump Insertion;  Surgeon:   Chente Moss MD;  Location: [facility] CARDIAC   CATH LAB     CV RIGHT HEART CATH N/A 9/21/2020    Procedure: CV RIGHT HEART CATH;  Surgeon: Dalton Baeza MD;  Location: [facility] CARDIAC CATH LAB     CV SWAN LUCIANA PROCEDURE N/A 2/13/2020    Procedure: [place] Procedure;  Surgeon: Chente Moss MD;  Location: [facility]  CARDIAC CATH LAB     EP ICD Bilateral 3/16/2020    Procedure: EP ICD;  Surgeon: Dali Day MD;  Location:  HEART   CARDIAC CATH LAB     INSERT VENTRICULAR ASSIST DEVICE LEFT (HEARTMATE II) N/A   2020    Procedure: INSERTION, LEFT VENTRICULAR ASSIST DEVICE (HEARTMATE   III);  Surgeon: Mac Jaramillo MD;  Location:  OR     THORACOSCOPY Right 3/6/2020    Procedure: RIGHT VIDEO-ASSISTED THORASCOPIC SURGERY, EVACUATION   OF HEMOTHORAX, PLACEMENT OF CHEST TUBES;  Surgeon: William Gan MD;  Location:  OR       History reviewed. No pertinent family history.    Social History     Social History Narrative     Not on file     Social History     Tobacco Use     Smoking status: Former Smoker     Quit date: 1994     Years since quittin.6     Smokeless tobacco: Never Used   Substance Use Topics     Alcohol use: Not on file     Drug use: Not on file       Immunization History   Administered Date(s) Administered     Flu, Unspecified 11/15/2019     Influenza (High Dose) 3 valent vaccine 10/25/2019     Pneumo Conj 13-V (2010&after) 2018       Patient Active Problem List   Diagnosis     Acute on chronic systolic congestive heart failure (H)     Anxiety     CKD (chronic kidney disease) stage 3, GFR 30-59 ml/min     Coronary artery disease involving native coronary artery of   native heart without angina pectoris     GERD (gastroesophageal reflux disease)     Gout     Hyperlipidemia with target LDL less than 100     Nonischemic cardiomyopathy (H)     Non-nephrotic range proteinuria     ABHINAV on CPAP     Type 2 diabetes mellitus with stage 3 chronic kidney disease,   without long-term current use of insulin (H)     Heart failure (H)     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)     Cardiac arrest (H)     LVAD (left ventricular assist device) present (H)     Chronic systolic congestive heart failure (H)     CKD (chronic kidney disease) stage 4, GFR 15-29 ml/min (H)     Bacteremia          "   Current Medications & Allergies:   Antibiotic History:    Prophylaxis:      allopurinol  200 mg Oral or Feeding Tube Daily     amiodarone  200 mg Oral Daily     aspirin  81 mg Oral Daily     bumetanide  2 mg Oral BID     ceFAZolin  2 g Intravenous Q8H     FLUoxetine  20 mg Oral Daily     hydrALAZINE  100 mg Oral 4x Daily     insulin aspart  1-7 Units Subcutaneous TID AC     insulin aspart  1-5 Units Subcutaneous At Bedtime     insulin glargine  10 Units Subcutaneous At Bedtime     lisinopril  10 mg Oral Daily     magnesium oxide  400 mg Oral or Feeding Tube Daily     multivitamin w/minerals  1 tablet Oral Daily     polyethylene glycol  17 g Oral Daily     senna-docusate  1 tablet Oral BID    Or     senna-docusate  2 tablet Oral BID     sodium chloride (PF)  3 mL Intracatheter Q8H     warfarin-No DOSE today  1 each Does not apply no dose today   (warfarin)       Infusions/Drips:      - MEDICATION INSTRUCTIONS -       - MEDICATION INSTRUCTIONS -       Warfarin Therapy Reminder         Allergies   Allergen Reactions     Heparin      HIT screen positive 2/14/20, reflex DAVINA negative; however heme   recommended treating as if positive  HIT screen negative 2/11/20     Oxycodone Itching and Other (See Comments)     Chlorhexidine Rash            Physical Exam:     Patient Vitals for the past 24 hrs:   BP Temp Temp src Pulse Resp SpO2 Height Weight   11/15/20 1125 -- 97.4  F (36.3  C) Oral 111 18 95 % -- --   11/15/20 0825 101/87 98.4  F (36.9  C) Oral 112 18 95 % -- --   11/15/20 0300 124/84 99.6  F (37.6  C) Axillary 112 18 94 % -- --   11/14/20 2328 98/87 98.7  F (37.1  C) Oral 109 18 96 % -- --   11/14/20 2000 -- -- -- 112 -- -- 1.702 m (5' 7\") --   11/14/20 1948 (!) 115/90 97.9  F (36.6  C) Oral 110 18 97 % --   99.8 kg (220 lb)     Ranges for vital signs:  Temp:  [97.4  F (36.3  C)-99.6  F (37.6    C)] 97.4  F (36.3  C)  Pulse:  [109-112] 111  Resp:  [18] 18  BP: ()/(84-90) 101/87  SpO2:  [94 %-97 %] 95 " %  Vitals:    11/14/20 1948   Weight: 99.8 kg (220 lb)       Physical Examination:  GENERAL:  well-appearing, in no acute distress. In bed  HEAD:  Head is normocephalic, atraumatic   EYES:  Eyes have anicteric sclerae without conjunctival   injection. Pupils reactive bilaterally.    ENT:  Oropharynx is moist without exudates or ulcers. Tongue is   midline  NECK:  Supple. No cervical lymphadenopathy  LUNGS:  Clear to auscultation bilaterally. No accessory muscle   use. Not on oxygen.   CARDIOVASCULAR:  HM noises. Minimal LE edema.    ABDOMEN:  Normal bowel sounds, soft, non-tender, non-distended.   No appreciable hepatosplenomegaly.  SKIN:  No acute rashes. LVAD dressing in place. ICD without   swelling or erythema.   EXTREMITIES: no joint swelling or erythema  NEUROLOGIC:  Grossly nonfocal. Active x4 extremities. Strength   equal.  LINES: PIV in place. No erythema around insertion site and   dressing intact.          Laboratory Data:     Metabolic Studies       Recent Labs   Lab Test 11/15/20  0541 11/14/20  2041 09/21/20  1440 09/21/20  1440 03/06/20  1615 03/06/20  1615 03/06/20  0322 03/06/20  0322 02/13/20  0206 02/13/20  0206 02/08/20  0408 02/08/20  0408    134   < >  --    < > 137  --  135   < > 132*   < > 134   POTASSIUM 4.4 4.9   < >  --    < > 4.1  --  4.1   < > 4.1  4.2     < > 3.5   CHLORIDE 102 102   < >  --    < >  --   --  104   < > 96   < >   103   CO2 21 25   < >  --    < >  --   --  30   < > 22   < > 23   ANIONGAP 10 7   < >  --    < >  --   --  1*   < > 14   < > 8   BUN 31* 35*   < >  --    < >  --   --  16   < > 34*   < > 50*   CR 1.78* 1.98*   < >  --    < >  --   --  1.02   < > 1.58*   < >   2.81*   GFRESTIMATED 39* 34*   < >  --    < >  --   --  76   < > 45*   <   > 22*   * 194*   < >  --    < > 123*  --  149*   < > 154*   < >   155*   A1C  --   --   --   --   --   --   --   --   --   --   --  7.3*   CALOS 8.5 8.8   < >  --    < >  --   --  8.0*   < > 8.9   < > 8.2*   PHOS  --   --    --   --   --   --   --  3.8   < > 5.4*   < > 5.0*     MAG 2.0  --   --   --    < >  --   --  2.2   < > 2.0   < >  --    LACT  --   --   --  1.3  --  0.7   < >  --    < > 9.5*   < > 0.6*     CKT  --   --   --   --   --   --   --   --   --  39  --   --     < > = values in this interval not displayed.       Hepatic Studies    Recent Labs   Lab Test 11/15/20  0830 11/14/20  2041 11/12/20 10/16/20   BILITOTAL  --  0.7 0.5 0.6   DBIL  --  0.4*  --  0.0   ALKPHOS  --  184* 202.0* 139   PROTTOTAL  --  7.8 7.6 8.4   ALBUMIN  --  2.6* 3.7 4.3   AST  --  48* 65.0* 70   ALT  --  56 64* 88     --   --  322       Hematology Studies      Recent Labs   Lab Test 11/15/20  0830 11/14/20  2041 11/05/20 09/21/20  1438 07/13/20 06/16/20   WBC 19.9* 18.4* 9.4 7.2 7.0 7.0   ANEU 17.4*  --   --  5.3  --   --    ALYM 0.8  --   --  1.1  --   --    GIULIANO 1.2  --   --  0.7  --   --    AEOS 0.0  --   --  0.1  --   --    HGB 10.0* 10.5* 10.1* 10.9* 10.9* 10.7*   HCT 33.6* 35.7* 33.7 36.8* 36 35.5    430 385 315 382 378       Arterial Blood Gas Testing    Recent Labs   Lab Test 03/15/20  2235 03/06/20  1615 03/06/20  1550 02/26/20  1321 02/24/20  0345 02/24/20  0345 02/23/20  1953   PH 7.53* 7.41 7.39  --   --  7.46* 7.47*   PCO2 35 42 45  --   --  42 37   PO2 126* 96 49*  --   --  123* 81   HCO3 29* 26 27  --   --  30* 27   O2PER 21 65 65 21.0   < > 2L  2L 21    < > = values in this interval not displayed.        Medication levels    Recent Labs   Lab Test 02/16/20  0400 02/13/20  2147 02/13/20  2147   VANCOMYCIN 18.5   < >  --    TOBRA  --   --  13.0    < > = values in this interval not displayed.     Inflammatory Markers    Recent Labs   Lab Test 11/05/20 09/21/20  1438   CRP 54.9 6.6     Pancreatitis testing    Recent Labs   Lab Test 02/08/20  0408   TRIG 57       Gout Labs      Recent Labs   Lab Test 02/08/20  0408   URIC 7.5*       Clotting Studies    Recent Labs   Lab Test 11/15/20  0541 11/14/20  2041 11/04/20 10/02/20  03/20/20  0530 03/20/20  0530   INR 6.13* 6.21* 2.4* 2.3*   < > 2.84*   PTT  --   --   --   --   --  59*    < > = values in this interval not displayed.       Iron Testing    Recent Labs   Lab Test 11/15/20  0830 02/08/20  0408 02/08/20  0408   IRON  --   --  25*   FEB  --   --  306   IRONSAT  --   --  8*   HEAVENLY  --   --  189   MCV 68*   < > 87   B12  --   --  752    < > = values in this interval not displayed.       Thyroid Studies     Recent Labs   Lab Test 02/08/20  0408   TSH 1.72       Urine Studies     Recent Labs   Lab Test 02/22/20  0944 02/13/20  0334 02/07/20 2029   URINEPH 5.5 6.0 5.0   NITRITE Negative Negative Negative   LEUKEST Small* Small* Negative   WBCU 2 81* 3     Last Culture results with specimen source  Culture Micro   Date Value Ref Range Status   11/14/2020 No growth after 9 hours  Preliminary   11/14/2020 No growth after 9 hours  Preliminary   09/21/2020 No growth  Final   09/21/2020 No growth  Final   03/13/2020 No growth  Final   03/13/2020 No growth  Final   03/03/2020 No growth  Final   03/03/2020 No growth  Final   02/22/2020 No growth  Final   02/22/2020 No growth  Final   02/16/2020 No growth  Final   02/16/2020 No growth  Final   02/15/2020 No growth  Final   02/15/2020 (A)  Final    Cultured on the 2nd day of incubation:  Staphylococcus epidermidis     02/15/2020   Final    Critical Value/Significant Value, preliminary result only,   called to and read back by  Cee Benavidez RN on 2.16.20 at 2220.  JRT     02/15/2020   Final    (Note)  POSITIVE for STAPHYLOCOCCUS EPIDERMIDIS and POSITIVE for the mecA  gene (resistant to methicillin) by [employer] multiplex nucleic   acid  test. Final identification and antimicrobial susceptibility   testing  will be verified by standard methods.    Specimen tested with [employer] multiplex, gram-positive blood   culture  nucleic acid test for the following targets: Staph aureus, Staph  epidermidis, Staph lugdunensis, other Staph species,  Enterococcus  faecalis, Enterococcus faecium, Streptococcus species, S.   agalactiae,  S. anginosus grp., S. pneumoniae, S. pyogenes, Listeria sp., mecA  (methicillin resistance) and Ernst/B (vancomycin resistance).    Critical Value/Significant Value called to and read back by   Cee Benavidez RN, 2.17.20 @ 0207 pt.     02/14/2020 No growth  Final   02/13/2020 (A)  Final    Cultured on the 1st day of incubation:  Proteus mirabilis  Susceptibility testing done on previous specimen     02/13/2020   Final    Critical Value/Significant Value, preliminary result only,   called to and read back by  Mima Cabrera RN. @1426. 2.13.20. BS.      02/13/2020 (A)  Final    Cultured on the 1st day of incubation:  Enterococcus faecalis  Susceptibility testing done on previous specimen     02/13/2020   Final    Critical Value/Significant Value, preliminary result only,   called to and read back by  Alisson Castillo RN on 2.13.20 at 1728.  JRT     02/13/2020   Final    (Note)  POSITIVE for ENTEROCOCCUS FAECALIS and NEGATIVE for Ernst/vanB   genes  by [employer] multiplex nucleic acid test. Final identification and  antimicrobial susceptibility testing will be verified by standard  methods.    Specimen tested with [employer]igene multiplex, gram-positive blood   culture  nucleic acid test for the following targets: Staph aureus, Staph  epidermidis, Staph lugdunensis, other Staph species, Enterococcus  faecalis, Enterococcus faecium, Streptococcus species, S.   agalactiae,  S. anginosus grp., S. pneumoniae, S. pyogenes, Listeria sp., mecA  (methicillin resistance) and Ernst/B (vancomycin resistance).    Critical Value/Significant Value called to and read back by MARIA EUGENIA MILLAN RN 2/13/20 2036           Specimen Description   Date Value Ref Range Status   11/14/2020 Blood Left Hand  Final   11/14/2020 Blood Left Arm  Final   09/21/2020 Blood Left Arm  Final   09/21/2020 Blood Right Arm  Final   03/13/2020 Blood Left Hand  Final    03/13/2020 Blood Right Hand  Final   03/03/2020 Blood Left Hand  Final   03/03/2020 Blood Right Arm  Final   02/22/2020 Blood Right Hand  Final   02/22/2020 Blood Right Hand  Final   02/19/2020 Nares  Final   02/16/2020 Blood Right Hand  Final   02/16/2020 Blood Left Hand  Final   02/15/2020 Blood Right Hand  Final   02/15/2020 Blood Left Hand  Final   02/14/2020 Blood Right Hand  Final   02/13/2020 Blood Left Hand  Final   02/13/2020 Blood Right Hand  Final   02/13/2020 Nasopharyngeal  Final        Last check of C difficile  No results found for: CDBPCT    Syphilis Testing    Treponema Antibodies   Date Value Ref Range Status   02/12/2020 Nonreactive NR^Nonreactive Final       Quantiferon testing   Recent Labs   Lab Test 11/15/20  0830 11/05/20 02/12/20  0440 02/12/20  0440 02/08/20  0408   TBRES  --   --   --  Negative Negative   LYMPH 4.0 13.5   < > 4.8 17.5    < > = values in this interval not displayed.       Virology:  Respiratory virus testing    Recent Labs   Lab Test 11/14/20  2140 02/13/20  0334   INFZA  --  Negative   INFZB  --  Negative   IRSV  --  Negative   [id number] Nasopharyngeal  --    SARSCOVRES NEGATIVE  --      Hepatitis B Testing     Recent Labs   Lab Test 02/12/20  0440 02/08/20  0408   AUSAB 0.69 1.99   HBCAB Nonreactive Nonreactive   HEPBANG Nonreactive Nonreactive        Hepatitis C Antibody   Date Value Ref Range Status   02/12/2020 Nonreactive NR^Nonreactive Final     Comment:     Assay performance characteristics have not been established for   newborns,   infants, and children     02/08/2020 Nonreactive NR^Nonreactive Final     Comment:     Assay performance characteristics have not been established for   newborns,   infants, and children         CMV Antibody IgG   Date Value Ref Range Status   02/12/2020 >8.0 (H) 0.0 - 0.8 AI Final     Comment:     Positive  Antibody index (AI) values reflect qualitative changes in   antibody   concentration that cannot be directly associated with  clinical   condition or   disease state.     02/08/2020 7.8 (H) 0.0 - 0.8 AI Final     Comment:     Positive  Antibody index (AI) values reflect qualitative changes in   antibody   concentration that cannot be directly associated with clinical   condition or   disease state.       Varicella Zoster Virus Antibody IgG   Date Value Ref Range Status   02/12/2020 2.0 (H) 0.0 - 0.8 AI Final     Comment:     Positive, suggests prev. exposure and probable immunity  Antibody index (AI) values reflect qualitative changes in   antibody   concentration that cannot be directly associated with clinical   condition or   disease state.     02/08/2020 2.4 (H) 0.0 - 0.8 AI Final     Comment:     Positive, suggests prev. exposure and probable immunity  Antibody index (AI) values reflect qualitative changes in   antibody   concentration that cannot be directly associated with clinical   condition or   disease state.       Toxoplasma Antibody IgG   Date Value Ref Range Status   02/12/2020 <3.0 0.0 - 7.1 IU/mL Final     Comment:     Negative- Absence of detectable Toxoplasma gondii IgG   antibodies. A negative   result does not rule out acute infection.  The magnitude of the measured result is not indicative of the   amount of   antibody present. The concentrations of anti-Toxoplasma gondii   IgG in a given   specimen determined with assays from different manufacturers can   vary due to   differences in assay methods and reagent specificity.     02/08/2020 <3.0 0.0 - 7.1 IU/mL Final     Comment:     Negative- Absence of detectable Toxoplasma gondii IgG   antibodies. A negative   result does not rule out acute infection.  The magnitude of the measured result is not indicative of the   amount of   antibody present. The concentrations of anti-Toxoplasma gondii   IgG in a given   specimen determined with assays from different manufacturers can   vary due to   differences in assay methods and reagent specificity.       No results found for:  H1IGG, H2IGG, EBVCAM    Imaging:  Recent Results (from the past 48 hour(s))   US Abdomen Complete    Narrative    EXAMINATION: US ABDOMEN COMPLETE, 11/15/2020 9:20 AM     COMPARISON: 2/8/2020    HISTORY: Concern for abscess, patient unable to get CT scan due   to  elevated creatinine    TECHNIQUE: The abdomen was scanned in standard fashion with  specialized ultrasound transducer(s) using both gray-scale and   limited  color Doppler techniques.    FINDINGS:    Liver: The liver demonstrates normal homogeneous echotexture. The  liver measures 19.7 cm in craniocaudal dimension. No evidence of   a  focal hepatic mass. The main portal vein is patent with antegrade  flow. Gallbladder: There is no wall thickening, positive   sonographic  Yoon's sign or evidence for cholelithiasis.    Bile Ducts: Both the intra- and extrahepatic biliary system are   of  normal caliber. The common bile duct measures 3.7 mm in diameter.    Pancreas: Not well visualized.    Kidneys: Both kidneys are of normal echotexture, without mass or  hydronephrosis. The craniocaudal dimensions are: right- 10.5,   left-  11.5.    Spleen: The spleen is normal in size, measuring 11.1 in sagittal  dimension.    Aorta and IVC: The visualized portions of the aorta and IVC are  unremarkable. The proximal aorta measures 2.7 cm in diameter and   the  IVC measures 2.4 cm in diameter.    Fluid: Small volume of ascites. Partially imaged right pleural  effusion.        Impression    IMPRESSION:   1. Hepatomegaly with small volume ascites. No intra-abdominal   abscess  visualized. If there is continued clinical concern for abscess,  consider noncontrast CT scan of the abdomen/pelvis.  2. Partially imaged right pleural effusion, likely trace.    I have personally reviewed the examination and initial   interpretation  and I agree with the findings.    DEION FLANAGAN MD        Echo Complete     Status: None    Narrative    [id number]  [id number]  [id number]  [id number]^PHYLLIS FLORES  IQRA MINOR^CONNIE^YODIT           [facility],[place]  Echocardiography Laboratory  [address] [place] [address]     Name: WOLFGANG LEACH  MRN: [id number]  : 1955  Study Date: 2020 10:56 AM  Age: 65 yrs  Gender: Male  Patient Location: [facility] [place]  Reason For Study: Endocarditis  Ordering Physician: CONNIE JIN  Performed By: Nelly Monte RDCS     BSA: 2.1 m2  Height: 67 in  Weight: 220 lb  HR: 118  BP: 97/67 mmHg  _____________________________________________________________________________  __        Procedure  LVAD Echocardiogram with two-dimensional, color and spectral Doppler  performed. Technically difficult study.Extremely poor acoustic windows.  Limited information was obtained during study.  _____________________________________________________________________________  __        Interpretation Summary  Technically difficult study.Extremely poor acoustic windows.  Limited information was obtained during study.  LVAD cannula was seen in the expected anatomic position in the LV apex.  HM3 at 5500RPM.  LVIDd 48mm.  Septum probably normal.  Aortic valve cannot be assessed.  Flow velocities not obtained.  Dilation of the inferior vena cava is present with abnormal respiratory  variation in diameter.  No pericardial effusion is present.  _____________________________________________________________________________  __        Left Ventricle  The Ejection Fraction is estimated at <20%. LVAD cannula was seen in the  expected anatomic position in the LV apex. HM3 at 5500RPM.  LVIDd 48mm.  Septum probably normal.  Aortic valve cannot be assessed.  Flow velocities not obtained.     Right Ventricle  Right ventricular function cannot be assessed due to poor image quality.     Vessels  Dilation of the inferior vena cava is present with abnormal respiratory  variation in diameter.     Pericardium  No pericardial effusion is present.      _____________________________________________________________________________  __  MMode/2D Measurements & Calculations  IVSd: 0.92 cm  LVIDd: 4.8 cm  LVIDs: 4.6 cm  LVPWd: 0.88 cm     FS: 4.6 %  LV mass(C)d: 147.6 grams  LV mass(C)dI: 70.1 grams/m2  asc Aorta Diam: 3.5 cm  RWT: 0.37           _____________________________________________________________________________  __           Report approved by: Graciela Tomlinson 11/16/2020 11:39 AM      Blood culture     Status: Abnormal (Preliminary result)    Specimen: Blood    Left Hand   Result Value Ref Range    Specimen Description Blood Left Hand     Culture Micro (A)      Cultured on the 1st day of incubation:  Staphylococcus aureus  Susceptibility testing in progress      Culture Micro       Critical Value/Significant Value, preliminary result only, called to and read back by  ROSA FONTENOT, KRISTEN [id number] 11.15.20 ND      Culture Micro       (Note)  POSITIVE for STAPHYLOCOCCUS AUREUS and NEGATIVE for the mecA gene  (not MRSA) by [employer] multiplex nucleic acid test. The mecA gene was  not detected. Final identification and antimicrobial susceptibility  testing will be verified by standard methods.    Specimen tested with [employer] multiplex, gram-positive blood culture  nucleic acid test for the following targets: Staph aureus, Staph  epidermidis, Staph lugdunensis, other Staph species, Enterococcus  faecalis, Enterococcus faecium, Streptococcus species, S. agalactiae,  S. anginosus grp., S. pneumoniae, S. pyogenes, Listeria sp., mecA  (methicillin resistance) and Ernst/B (vancomycin resistance).    Critical Value/Significant Value called to and read back by Rosa Fontenot Rn 1920 11.15.20 NDP     Blood culture     Status: Abnormal (Preliminary result)    Specimen: Blood    Left Arm   Result Value Ref Range    Specimen Description Blood Left Arm     Culture Micro (A)      Cultured on the 1st day of incubation:  Staphylococcus aureus  Susceptibility testing done on  previous specimen      Culture Micro       Critical Value/Significant Value, preliminary result only, called to and read back by  PATRICIO SHANNON RN 2101 11.15.20 NDP     Clostridium difficile toxin B PCR     Status: None    Specimen: Feces   Result Value Ref Range    Specimen Description Feces     C Diff Toxin B PCR Negative NEG^Negative   Blood culture     Status: None (Preliminary result)    Specimen: Blood    Left Arm   Result Value Ref Range    Specimen Description Blood Left Arm     Culture Micro No growth after 10 hours    Blood culture     Status: None (Preliminary result)    Specimen: Blood    Right Hand   Result Value Ref Range    Specimen Description Blood Right Hand     Culture Micro No growth after 10 hours    Results for orders placed or performed in visit on 11/16/20   INR     Status: Abnormal   Result Value Ref Range    INR 8.2 (A) 0.90 - 1.10

## 2020-11-16 NOTE — PLAN OF CARE
D: admitted 11/14 directly from ID clinic with driveline infection (likely MSSA) and elevated INR (8.2).  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   Running: intermittent abx, L PIV TKO  PRN: Ambien at HS     A: A0x4. VSS on RA/home CPAP at HS. Tele shows paced rhythm, rate 100-110s. Afebrile, but pt reports getting intermittent chills. LVAD HM3 numbers WNL, except intermittent low PI (2.0-2.9). Dressing CDI. Driveline culture to be collected today. Urinating adequately. Up independently. BG stable overnight. Slept between cares.      P: Continue to monitor Pt status and report changes to Cards 2.      [id number]  Antonette Rebolledo RN on 11/16/2020 at 6:19 AM     promethazine-dextromethorphan (PROMETHAZINE-DM) 6.25-15 mg/5 mL Syrp 150 mL 3 5/7/2019 5/17/2019 --   Sig - Route: Take 5 mLs by mouth 3 (three) times daily as needed. - Oral   Sent to pharmacy as: promethazine-dextromethorphan (PROMETHAZINE-DM) 6.25-15 mg/5 mL Syrp   Class: Normal   Order: [id number]   Date/Time Signed: 5/7/2019 10:12       E-Prescribing Status: Sent to pharmacy (5/7/2019 10:12 AM CDT)   Pharmacy     [id number] - [place], [place] - [address]        RX IS OUT OF STOCK - PLEASE SEND IN SOMETHING ELSE, THANK YOU

## 2020-11-16 NOTE — PROGRESS NOTES
CLINICAL NUTRITION SERVICES - ASSESSMENT NOTE     Nutrition Prescription    RECOMMENDATIONS FOR MDs/PROVIDERS TO ORDER:  None at this time    Malnutrition Status:    Patient does not meet two of the established criteria necessary for diagnosing malnutrition but is at risk for malnutrition    Recommendations already ordered by Registered Dietitian (RD):  Ordered oral nutrition supplements    Future/Additional Recommendations:  1. Continue regular diet as ordered  2. When oral intake improves, monitor need for low sodium diet and fluid restriction   3. Continue thera-vit-M, as ordered, to help ensure micronutrient needs are met  4. Monitor blood glucose. Hemoglobin A1c of 7.3 on 2/8/20  5. Monitor need for iron supplement  6. If intake declines, consider TF, recommend: Isosource 1.5 @ 50ml/hr (1200ml/day) to provide 1800kcals (24kcals/kg), 82g protein (1.1g/kg), 211g CHO, 18g fiber, 912ml free H2O     REASON FOR ASSESSMENT  Eliseo Tanner is a/an 65 year old male assessed by the dietitian for Admission Nutrition Risk Screen for reduced oral intake over the last month    Reason for admission:  Suspected MSSA bacteremia as BC grew out GPC on 11/14    PMH:  NICM LVEF 15-20% s/p LVAD HM3 2/18/20 complicated by MSSA driveline infection (11/5/20), moderate nonobstructive CAD, severe MR, HTN, ABHINAV, DM2, CKD III, HLD, ANA, prior tobacco use    NUTRITION HISTORY  During February/March 2020 admission, pt was briefly on TFs (2/22/20-2/27/20) due to poor oral intake per past RD notes    When diagnosed with MSSA driveline infection 11/5/20, pt was experiencing symptoms of increased abdominal pain and distension. Additionally, pt was experiencing a few days of watery diarrhea which resolved the morning of 11/14 per H&P  Food allergies: NKFA    Per pt 11/16/20:  Pt reports decreased appetite and intake over the past week and a half due to recent driveline infection where he reports eating half as much as normal. Pt reports  "that appetite has been improving during current admission. He reports ordering two meals yesterday and eating 100% of the trays. When appetite and eating are better, pt reports baseline intake of 2 meals per day. For breakfast, pt likes bagels and for meals will do a lot of soup, especially chicken noodle. Pt reports a usual wt of 215lbs and says he has noticed it has increased recently.     CURRENT NUTRITION ORDERS  Diet: Regular, no caffeine since 11/14, pt was briefly NPO 11/15 for abdominal US  Intake/Tolerance: Pt ordered small breakfast and substantial dinner yesterday and ate 100% per [facility] and food records     LABS  Labs reviewed  11/16  (H)  Hemoglobin A1c was 7.3 2/8/20  Sodium, potassium, mag wnl  11/15 creatinine 1.78 (H)    MEDICATIONS  Medications reviewed  Bumex  Insulin  Magnesium oxide 400mg daily  Multivitamin w/minerals (Thera-Vit-M)   Miralax and Senna    ANTHROPOMETRICS  Height: 170.2 cm (5' 7\")  Most Recent Weight: 98 kg (216 lb)    IBW: 67 kg  BMI: Obesity Grade I BMI 30-34.9  Weight History: 109 kg (9/10/18), 110.5 kg (10/7/19), 103.9 kg (12/17/19), 111.6 kg (2/6/20), 94.8 kg (3/1/20), 88.6kg (3/20/20), 93kg (5/19/20), 98kg (9/21/20), 98.9kg (11/12/20), 99.8kg (11/14/20), 98kg (11/16/20) - difficult to assess wt trending due to diuresis    Dosing Weight: 75kg (adjusted wt based on lowest wt this admission of 98kg on 11/16/20)    ASSESSED NUTRITION NEEDS  Estimated Energy Needs: 4127-3035 kcals/day (20 - 25 kcals/kg)  Justification: Estimated needs with wt status  Estimated Protein Needs: 75-90 grams protein/day (1 - 1.2 grams of pro/kg)  Justification: Maintenance to slightly increased needs  Estimated Fluid Needs: 3269-1517 mL/day (25 - 30 mL/kg)   Justification: Maintenance pending fluid status    PHYSICAL FINDINGS  See malnutrition section below.  No edema noted  Distended abdomen - ascites   Last BM on 11/15, per H&P pt previsouly had a few days of watery diarrhea which " resolved on 11/14    MALNUTRITION  % Intake: < 75% for > 7 days (non-severe)  % Weight Loss: Weight loss does not meet criteria - difficult to assess with fluid shifts  Subcutaneous Fat Loss: None noted  Muscle Loss: None noted  Fluid Accumulation/Edema: None noted  Malnutrition Diagnosis: Patient does not meet two of the established criteria necessary for diagnosing malnutrition but is at risk for malnutrition    NUTRITION DIAGNOSIS  Inadequate oral intake related to decreased appetite in the setting of infection as evidenced by meeting less than 75% of needs over past week and a half.    INTERVENTIONS  Implementation  Medical nutrition supplements: strawberry boost at 2pm daily   Nutrition education: briefly discussed importance of calories and protein when in the hospital     Goals  Patient to consume % of nutritionally adequate meal trays TID, or the equivalent with supplements/snacks.     Monitoring/Evaluation  Progress toward goals will be monitored and evaluated per protocol.    Yaz Amor  Dietetic Intern     I have read and agree with the above nutrition assessment, eval, and recs.   Abby Woods, MS, RD, LD, [facility]   6C Pgr: [phone]

## 2020-11-16 NOTE — PLAN OF CARE
Pt A&Ox4, VSS.  Independent.  Abtx given per order.  Positive BC reported today, see prior note.  Pt received vitamin K this shift for high INR levels.  Cl diff came back negative this shift.  Rhythm: Paced @ 110's, no ectopy reported.  Pt declined pain and nausea.  LVAD WNL, dsg chg completed today.  Next dsg chg please take a wound culture from the site.  Plan: Treat infections with ordered abtx.  Continue to monitor and contact Cards 2 with concerns.

## 2020-11-17 ENCOUNTER — APPOINTMENT (OUTPATIENT)
Dept: ULTRASOUND IMAGING | Facility: CLINIC | Age: 65
DRG: 314 | End: 2020-11-17
Attending: INTERNAL MEDICINE
Payer: MEDICARE

## 2020-11-17 PROBLEM — I48.0 PAROXYSMAL ATRIAL FIBRILLATION (H): Status: ACTIVE | Noted: 2020-11-17

## 2020-11-17 LAB
ALBUMIN UR-MCNC: 30 MG/DL
ANION GAP SERPL CALCULATED.3IONS-SCNC: 7 MMOL/L (ref 3–14)
APPEARANCE UR: CLEAR
BACTERIA SPEC CULT: ABNORMAL
BACTERIA SPEC CULT: ABNORMAL
BILIRUB UR QL STRIP: NEGATIVE
BUN SERPL-MCNC: 41 MG/DL (ref 7–30)
CALCIUM SERPL-MCNC: 7.9 MG/DL (ref 8.5–10.1)
CHLORIDE SERPL-SCNC: 101 MMOL/L (ref 94–109)
CO2 SERPL-SCNC: 27 MMOL/L (ref 20–32)
COLOR UR AUTO: YELLOW
CREAT SERPL-MCNC: 1.77 MG/DL (ref 0.66–1.25)
ERYTHROCYTE [DISTWIDTH] IN BLOOD BY AUTOMATED COUNT: 20.2 % (ref 10–15)
GFR SERPL CREATININE-BSD FRML MDRD: 39 ML/MIN/{1.73_M2}
GLUCOSE BLDC GLUCOMTR-MCNC: 132 MG/DL (ref 70–99)
GLUCOSE BLDC GLUCOMTR-MCNC: 142 MG/DL (ref 70–99)
GLUCOSE BLDC GLUCOMTR-MCNC: 165 MG/DL (ref 70–99)
GLUCOSE BLDC GLUCOMTR-MCNC: 170 MG/DL (ref 70–99)
GLUCOSE BLDC GLUCOMTR-MCNC: 212 MG/DL (ref 70–99)
GLUCOSE SERPL-MCNC: 141 MG/DL (ref 70–99)
GLUCOSE UR STRIP-MCNC: NEGATIVE MG/DL
HCT VFR BLD AUTO: 32.4 % (ref 40–53)
HGB BLD-MCNC: 9.6 G/DL (ref 13.3–17.7)
HGB UR QL STRIP: NEGATIVE
HYALINE CASTS #/AREA URNS LPF: 1 /LPF (ref 0–2)
INR PPP: 3.4 (ref 0.86–1.14)
KETONES UR STRIP-MCNC: NEGATIVE MG/DL
LACTATE BLD-SCNC: 1.2 MMOL/L (ref 0.7–2)
LEUKOCYTE ESTERASE UR QL STRIP: NEGATIVE
MCH RBC QN AUTO: 20.3 PG (ref 26.5–33)
MCHC RBC AUTO-ENTMCNC: 29.6 G/DL (ref 31.5–36.5)
MCV RBC AUTO: 69 FL (ref 78–100)
MUCOUS THREADS #/AREA URNS LPF: PRESENT /LPF
NITRATE UR QL: NEGATIVE
PH UR STRIP: 5.5 PH (ref 5–7)
PLATELET # BLD AUTO: 472 10E9/L (ref 150–450)
POTASSIUM SERPL-SCNC: 3.5 MMOL/L (ref 3.4–5.3)
RBC # BLD AUTO: 4.73 10E12/L (ref 4.4–5.9)
RBC #/AREA URNS AUTO: 0 /HPF (ref 0–2)
SODIUM SERPL-SCNC: 135 MMOL/L (ref 133–144)
SOURCE: ABNORMAL
SP GR UR STRIP: 1.01 (ref 1–1.03)
SPECIMEN SOURCE: ABNORMAL
SQUAMOUS #/AREA URNS AUTO: <1 /HPF (ref 0–1)
UROBILINOGEN UR STRIP-MCNC: NORMAL MG/DL (ref 0–2)
WBC # BLD AUTO: 17.1 10E9/L (ref 4–11)
WBC #/AREA URNS AUTO: 0 /HPF (ref 0–5)

## 2020-11-17 PROCEDURE — 76770 US EXAM ABDO BACK WALL COMP: CPT

## 2020-11-17 PROCEDURE — 250N000013 HC RX MED GY IP 250 OP 250 PS 637: Performed by: STUDENT IN AN ORGANIZED HEALTH CARE EDUCATION/TRAINING PROGRAM

## 2020-11-17 PROCEDURE — 76770 US EXAM ABDO BACK WALL COMP: CPT | Mod: 26 | Performed by: RADIOLOGY

## 2020-11-17 PROCEDURE — 99233 SBSQ HOSP IP/OBS HIGH 50: CPT | Performed by: INTERNAL MEDICINE

## 2020-11-17 PROCEDURE — 81001 URINALYSIS AUTO W/SCOPE: CPT | Performed by: STUDENT IN AN ORGANIZED HEALTH CARE EDUCATION/TRAINING PROGRAM

## 2020-11-17 PROCEDURE — 999N000147 HC STATISTIC PT IP EVAL DEFER

## 2020-11-17 PROCEDURE — 83605 ASSAY OF LACTIC ACID: CPT | Performed by: INTERNAL MEDICINE

## 2020-11-17 PROCEDURE — 99233 SBSQ HOSP IP/OBS HIGH 50: CPT | Mod: 25 | Performed by: INTERNAL MEDICINE

## 2020-11-17 PROCEDURE — 250N000011 HC RX IP 250 OP 636: Performed by: INTERNAL MEDICINE

## 2020-11-17 PROCEDURE — 999N001017 HC STATISTIC GLUCOSE BY METER IP

## 2020-11-17 PROCEDURE — 36415 COLL VENOUS BLD VENIPUNCTURE: CPT | Performed by: INTERNAL MEDICINE

## 2020-11-17 PROCEDURE — 36415 COLL VENOUS BLD VENIPUNCTURE: CPT | Performed by: STUDENT IN AN ORGANIZED HEALTH CARE EDUCATION/TRAINING PROGRAM

## 2020-11-17 PROCEDURE — 93750 INTERROGATION VAD IN PERSON: CPT | Performed by: INTERNAL MEDICINE

## 2020-11-17 PROCEDURE — 85027 COMPLETE CBC AUTOMATED: CPT | Performed by: STUDENT IN AN ORGANIZED HEALTH CARE EDUCATION/TRAINING PROGRAM

## 2020-11-17 PROCEDURE — 87040 BLOOD CULTURE FOR BACTERIA: CPT | Performed by: STUDENT IN AN ORGANIZED HEALTH CARE EDUCATION/TRAINING PROGRAM

## 2020-11-17 PROCEDURE — 80048 BASIC METABOLIC PNL TOTAL CA: CPT | Performed by: STUDENT IN AN ORGANIZED HEALTH CARE EDUCATION/TRAINING PROGRAM

## 2020-11-17 PROCEDURE — 214N000001 HC R&B CCU UMMC

## 2020-11-17 PROCEDURE — 85610 PROTHROMBIN TIME: CPT | Performed by: STUDENT IN AN ORGANIZED HEALTH CARE EDUCATION/TRAINING PROGRAM

## 2020-11-17 RX ORDER — CEFAZOLIN SODIUM 2 G/100ML
2 INJECTION, SOLUTION INTRAVENOUS EVERY 8 HOURS
Qty: 12600 ML | Refills: 0 | Status: SHIPPED | OUTPATIENT
Start: 2020-11-17 | End: 2020-11-18

## 2020-11-17 RX ORDER — BISACODYL 10 MG
10 SUPPOSITORY, RECTAL RECTAL DAILY PRN
Qty: 10 SUPPOSITORY | Refills: 0 | Status: SHIPPED | OUTPATIENT
Start: 2020-11-17 | End: 2021-01-05

## 2020-11-17 RX ORDER — BISACODYL 10 MG
10 SUPPOSITORY, RECTAL RECTAL DAILY PRN
Status: DISCONTINUED | OUTPATIENT
Start: 2020-11-17 | End: 2020-11-19 | Stop reason: HOSPADM

## 2020-11-17 RX ORDER — AMOXICILLIN 250 MG
1 CAPSULE ORAL 2 TIMES DAILY
Qty: 30 TABLET | Refills: 0 | Status: SHIPPED | OUTPATIENT
Start: 2020-11-17 | End: 2021-01-05

## 2020-11-17 RX ORDER — TAMSULOSIN HYDROCHLORIDE 0.4 MG/1
0.4 CAPSULE ORAL DAILY
Qty: 30 CAPSULE | Refills: 0 | Status: SHIPPED | OUTPATIENT
Start: 2020-11-18 | End: 2022-03-08

## 2020-11-17 RX ORDER — FINASTERIDE 5 MG/1
5 TABLET, FILM COATED ORAL DAILY
Qty: 30 TABLET | Refills: 0 | Status: SHIPPED | OUTPATIENT
Start: 2020-11-18

## 2020-11-17 RX ORDER — TAMSULOSIN HYDROCHLORIDE 0.4 MG/1
0.4 CAPSULE ORAL DAILY
Status: DISCONTINUED | OUTPATIENT
Start: 2020-11-17 | End: 2020-11-19 | Stop reason: HOSPADM

## 2020-11-17 RX ORDER — BUMETANIDE 2 MG/1
2 TABLET ORAL
Qty: 30 TABLET | Refills: 0 | Status: SHIPPED | OUTPATIENT
Start: 2020-11-18 | End: 2020-11-30

## 2020-11-17 RX ORDER — POLYETHYLENE GLYCOL 3350 17 G/17G
17 POWDER, FOR SOLUTION ORAL DAILY
Qty: 17 G | Refills: 0 | Status: SHIPPED | OUTPATIENT
Start: 2020-11-18 | End: 2021-01-05

## 2020-11-17 RX ORDER — FINASTERIDE 5 MG/1
5 TABLET, FILM COATED ORAL DAILY
Status: DISCONTINUED | OUTPATIENT
Start: 2020-11-17 | End: 2020-11-19 | Stop reason: HOSPADM

## 2020-11-17 RX ORDER — NITROGLYCERIN 0.4 MG/1
TABLET SUBLINGUAL
Qty: 30 TABLET | Refills: 0 | Status: ON HOLD | OUTPATIENT
Start: 2020-11-17 | End: 2022-01-01

## 2020-11-17 RX ADMIN — INSULIN GLARGINE 10 UNITS: 100 INJECTION, SOLUTION SUBCUTANEOUS at 22:08

## 2020-11-17 RX ADMIN — BUMETANIDE 2 MG: 2 TABLET ORAL at 13:05

## 2020-11-17 RX ADMIN — HYDRALAZINE HYDROCHLORIDE 100 MG: 100 TABLET, FILM COATED ORAL at 21:20

## 2020-11-17 RX ADMIN — FLUOXETINE 20 MG: 20 CAPSULE ORAL at 08:03

## 2020-11-17 RX ADMIN — CEFAZOLIN SODIUM 2 G: 2 INJECTION, SOLUTION INTRAVENOUS at 02:07

## 2020-11-17 RX ADMIN — AMIODARONE HYDROCHLORIDE 200 MG: 200 TABLET ORAL at 08:03

## 2020-11-17 RX ADMIN — ASPIRIN 81 MG: 81 TABLET, DELAYED RELEASE ORAL at 08:02

## 2020-11-17 RX ADMIN — MULTIPLE VITAMINS W/ MINERALS TAB 1 TABLET: TAB at 08:02

## 2020-11-17 RX ADMIN — LISINOPRIL 10 MG: 10 TABLET ORAL at 08:03

## 2020-11-17 RX ADMIN — CEFAZOLIN SODIUM 2 G: 2 INJECTION, SOLUTION INTRAVENOUS at 10:38

## 2020-11-17 RX ADMIN — TAMSULOSIN HYDROCHLORIDE 0.4 MG: 0.4 CAPSULE ORAL at 14:20

## 2020-11-17 RX ADMIN — FINASTERIDE 5 MG: 5 TABLET, FILM COATED ORAL at 14:20

## 2020-11-17 RX ADMIN — INSULIN ASPART 2 UNITS: 100 INJECTION, SOLUTION INTRAVENOUS; SUBCUTANEOUS at 17:58

## 2020-11-17 RX ADMIN — HYDRALAZINE HYDROCHLORIDE 100 MG: 100 TABLET, FILM COATED ORAL at 08:02

## 2020-11-17 RX ADMIN — HYDRALAZINE HYDROCHLORIDE 100 MG: 100 TABLET, FILM COATED ORAL at 13:05

## 2020-11-17 RX ADMIN — DOCUSATE SODIUM 50 MG AND SENNOSIDES 8.6 MG 2 TABLET: 8.6; 5 TABLET, FILM COATED ORAL at 08:02

## 2020-11-17 RX ADMIN — BUMETANIDE 2 MG: 2 TABLET ORAL at 08:03

## 2020-11-17 RX ADMIN — MAGNESIUM OXIDE 400 MG: 400 TABLET ORAL at 08:03

## 2020-11-17 RX ADMIN — POLYETHYLENE GLYCOL 3350 17 G: 17 POWDER, FOR SOLUTION ORAL at 08:05

## 2020-11-17 RX ADMIN — DOCUSATE SODIUM 50 MG AND SENNOSIDES 8.6 MG 2 TABLET: 8.6; 5 TABLET, FILM COATED ORAL at 21:21

## 2020-11-17 RX ADMIN — ALLOPURINOL 200 MG: 100 TABLET ORAL at 08:03

## 2020-11-17 RX ADMIN — INSULIN ASPART 1 UNITS: 100 INJECTION, SOLUTION INTRAVENOUS; SUBCUTANEOUS at 08:13

## 2020-11-17 RX ADMIN — ZOLPIDEM TARTRATE 5 MG: 5 TABLET ORAL at 22:08

## 2020-11-17 RX ADMIN — CEFAZOLIN SODIUM 2 G: 2 INJECTION, SOLUTION INTRAVENOUS at 18:00

## 2020-11-17 RX ADMIN — BENZONATATE 100 MG: 100 CAPSULE ORAL at 22:08

## 2020-11-17 ASSESSMENT — ACTIVITIES OF DAILY LIVING (ADL)
ADLS_ACUITY_SCORE: 14

## 2020-11-17 ASSESSMENT — MIFFLIN-ST. JEOR: SCORE: 1707.05

## 2020-11-17 NOTE — PROGRESS NOTES
[facility]  Transplant Infectious Disease Progress Note     Patient:  Eliseo Tanner, Date of birth 1955, Medical record number [id number]  Date of Visit:  11/16/2020         Assessment and Recommendations:   Assessment:  65 y/o male with chronic systolic HF and NICM s/p HM 3 on 2/18/2020 c/b polymicrobial bacteremia (P mirabilis and E faecalis), ABHINAV on CPAP, type II DM, CKD stage III, MSSA exit drive line infection who has developed MSSA bacteremia.      1. Complicated MSSA bacteremia w/ associated LVAD drive line infection: developed pain around exit site followed by drainage~ 11/1. Initially started on doxycycline 11/5 but switched to tmp/smx when the culture revealed MSSA resistant to doxy-subsequently switched to tmp/smx 1 S.S. BID. Blood cultures were initially negative but then on recheck 11/12 Staph aureus growth was observed. Susceptibilities pending but presume this will be MSSA since that is what his wound culture grew.  Abdominal US count not visualize an abscess. CT A/P was negative for a deeper drive line infection. TTE was poor quality and given ICD placement will discuss CHAMP with team in AM.   Repeat blood cultures are pending. Of note does have bilateral TKA but currently there is no erythema or swelling over these areas.       2. Diarrhea: overall improved.      Historical Infectious Diseases Issues:  2/2020 P mirabilis and E faecalis bacteremia post VAD placement     Recommendations:  1. Continue cefazolin 2 grams IV q 12 hours  2. Repeat blood cultures  3. TTE was poor quality and valves were not visualized well. Will need to discuss CHAMP for a better visualization due to ICD and that he likely had prolonged bacteremia prior to admission.   4. Defer PICC line placement unit blood cultures have been negative 48-72 hours  5. Discuss home infusion benefits with social work-patient is agreeable to home IV antibiotics  6. Anticipate a duration of 6 weeks from 1st  negative blood cultures. Will likely require suppression after treatment course.     Transplant Infectious Disease will continue to follow with you.      Negar Bhatti DO. Pager [phone]        Interval History:   Since was last seen by ID, 11/15.vChart reviewed to date.  Afebrile. Blood cultures positive from 11/14. No new joint or back pain.       Transplants: Coordinator Kay Padilla    Review of Systems:Remaining systems all reviewed and negative.    Antibiotic History  Cefazolin 11/14-C         Current Medications & Allergies:       allopurinol  200 mg Oral or Feeding Tube Daily     amiodarone  200 mg Oral Daily     aspirin  81 mg Oral Daily     bumetanide  2 mg Oral BID     ceFAZolin  2 g Intravenous Q8H     FLUoxetine  20 mg Oral Daily     hydrALAZINE  100 mg Oral TID     insulin aspart  1-7 Units Subcutaneous TID AC     insulin aspart  1-5 Units Subcutaneous At Bedtime     insulin glargine  10 Units Subcutaneous At Bedtime     lisinopril  10 mg Oral Daily     magnesium oxide  400 mg Oral or Feeding Tube Daily     multivitamin w/minerals  1 tablet Oral Daily     polyethylene glycol  17 g Oral Daily     senna-docusate  1 tablet Oral BID    Or     senna-docusate  2 tablet Oral BID     sodium chloride (PF)  3 mL Intracatheter Q8H     warfarin-No DOSE today  1 each Does not apply no dose today (warfarin)       Infusions/Drips:    - MEDICATION INSTRUCTIONS -       - MEDICATION INSTRUCTIONS -       Warfarin Therapy Reminder         Allergies   Allergen Reactions     Heparin      HIT screen positive 2/14/20, reflex DAVINA negative; however heme recommended treating as if positive  HIT screen negative 2/11/20     Oxycodone Itching and Other (See Comments)     Chlorhexidine Rash            Physical Exam:   Vitals were reviewed.  All vitals stable.  Patient Vitals for the past 24 hrs:   BP Temp Temp src Pulse Resp SpO2 Weight   11/16/20 1455 (!) 115/99 98.3  F (36.8  C) Oral 116 -- 94 % --   11/16/20 1117 -- 98.6  F  (37  C) Oral 115 18 93 % --   11/16/20 0657 -- -- -- -- -- -- 98 kg (216 lb)   11/16/20 0400 97/75 99  F (37.2  C) Axillary 116 18 96 % --   11/16/20 0002 111/89 98.4  F (36.9  C) Axillary 119 18 96 % --   11/15/20 1941 (!) 114/102 99.3  F (37.4  C) Oral 113 18 95 % --     Ranges for vital signs:  Temp:  [98.3  F (36.8  C)-99.3  F (37.4  C)] 98.3  F (36.8  C)  Pulse:  [113-119] 116  Resp:  [18] 18  BP: ()/() 115/99  SpO2:  [93 %-96 %] 94 %  Vitals:    11/14/20 1948 11/16/20 0657   Weight: 99.8 kg (220 lb) 98 kg (216 lb)       Physical Examination:  GENERAL:  well-appearing. in bed; in no acute distress.  HEAD:  Head is normocephalic, atraumatic   EYES:  Eyes have anicteric sclerae without conjunctival injection. y  LUNGS: . No accessory muscle use. Not on oxygen.   EXTREMITIES: No joint erythema or swelling.   NEUROLOGIC:  Grossly nonfocal. Active x4 extremities  LINES: peripheral IV in place without any surrounding erythema or exudate. Dressing intact.          Laboratory Data:     Metabolic Studies       Recent Labs   Lab Test 11/16/20  0616 11/15/20  0541 09/21/20  1440 09/21/20  1440 03/06/20  1615 03/06/20  1615 03/06/20  0322 03/06/20  0322 02/13/20  0206 02/13/20  0206 02/08/20  0408 02/08/20  0408    133   < >  --    < > 137  --  135   < > 132*   < > 134   POTASSIUM 3.8 4.4   < >  --    < > 4.1  --  4.1   < > 4.1  4.2   < > 3.5   CHLORIDE 100 102   < >  --    < >  --   --  104   < > 96   < > 103   CO2 25 21   < >  --    < >  --   --  30   < > 22   < > 23   ANIONGAP 8 10   < >  --    < >  --   --  1*   < > 14   < > 8   BUN 30 31*   < >  --    < >  --   --  16   < > 34*   < > 50*   CR 1.66* 1.78*   < >  --    < >  --   --  1.02   < > 1.58*   < > 2.81*   GFRESTIMATED 42* 39*   < >  --    < >  --   --  76   < > 45*   < > 22*   * 143*   < >  --    < > 123*  --  149*   < > 154*   < > 155*   A1C  --   --   --   --   --   --   --   --   --   --   --  7.3*   CALOS 8.2* 8.5   < >  --    < >  --    --  8.0*   < > 8.9   < > 8.2*   PHOS  --   --   --   --   --   --   --  3.8   < > 5.4*   < > 5.0*   MAG  --  2.0  --   --    < >  --   --  2.2   < > 2.0   < >  --    LACT  --   --   --  1.3  --  0.7   < >  --    < > 9.5*   < > 0.6*   CKT  --   --   --   --   --   --   --   --   --  39  --   --     < > = values in this interval not displayed.       Hepatic Studies    Recent Labs   Lab Test 11/15/20  0830 11/14/20  2041 11/12/20 10/16/20   BILITOTAL  --  0.7 0.5 0.6   DBIL  --  0.4*  --  0.0   ALKPHOS  --  184* 202.0* 139   PROTTOTAL  --  7.8 7.6 8.4   ALBUMIN  --  2.6* 3.7 4.3   AST  --  48* 65.0* 70   ALT  --  56 64* 88     --   --  322       Hematology Studies      Recent Labs   Lab Test 11/16/20  0616 11/15/20  0830 11/14/20 2041 11/05/20 09/21/20  1438 07/13/20   WBC 15.7* 19.9* 18.4* 9.4 7.2 7.0   ANEU  --  17.4*  --   --  5.3  --    ALYM  --  0.8  --   --  1.1  --    GIULIANO  --  1.2  --   --  0.7  --    AEOS  --  0.0  --   --  0.1  --    HGB 9.7* 10.0* 10.5* 10.1* 10.9* 10.9*   HCT 32.7* 33.6* 35.7* 33.7 36.8* 36   * 418 430 385 315 382     Arterial Blood Gas Testing    Recent Labs   Lab Test 03/15/20  2235 03/06/20  1615 03/06/20  1550 02/26/20  1321 02/24/20  0345 02/24/20  0345 02/23/20  1955   PH 7.53* 7.41 7.39  --   --  7.46* 7.47*   PCO2 35 42 45  --   --  42 37   PO2 126* 96 49*  --   --  123* 81   HCO3 29* 26 27  --   --  30* 27   O2PER 21 65 65 21.0   < > 2L  2L 21    < > = values in this interval not displayed.        Medication levels    Recent Labs   Lab Test 02/16/20  0400 02/13/20 2147 02/13/20 2147   VANCOMYCIN 18.5   < >  --    TOBRA  --   --  13.0    < > = values in this interval not displayed.       Inflammatory Markers    Recent Labs   Lab Test 11/05/20 09/21/20  1438 03/04/20  0757 02/08/20 0408   CRP 54.9 6.6 77.0* 21.0*     Pancreatitis testing    Recent Labs   Lab Test 02/08/20  0408   TRIG 57       Gout Labs      Recent Labs   Lab Test 02/08/20 0408   URIC 7.5*        Clotting Studies    Recent Labs   Lab Test 11/16/20  0616 11/15/20  0541 11/14/20  2041 11/14/20 03/20/20  0530 03/20/20  0530   INR 4.38* 6.13* 6.21* 8.2*   < > 2.84*   PTT  --   --   --   --   --  59*    < > = values in this interval not displayed.       Iron Testing    Recent Labs   Lab Test 11/16/20  0616 02/08/20  0408 02/08/20  0408   IRON 16*  --  25*     --  306   IRONSAT 6*  --  8*   HEAVENLY 75  --  189   MCV 67*   < > 87   B12  --   --  752    < > = values in this interval not displayed.       Thyroid Studies     Recent Labs   Lab Test 02/08/20  0408   TSH 1.72       Body fluid stats    Recent Labs   Lab Test 11/16/20  1140   GS No organisms seen  Rare  WBC'S seen         Microbiology:  Last Culture results with specimen source  Culture Micro   Date Value Ref Range Status   11/15/2020 No growth after 18 hours  Preliminary   11/15/2020 No growth after 18 hours  Preliminary   11/14/2020 (A)  Preliminary    Cultured on the 1st day of incubation:  Staphylococcus aureus  Susceptibility testing in progress     11/14/2020   Preliminary    Critical Value/Significant Value, preliminary result only, called to and read back by  ROSA ACE, KRISTEN 1553 11.15.20 [facility]     11/14/2020   Preliminary    (Note)  POSITIVE for STAPHYLOCOCCUS AUREUS and NEGATIVE for the mecA gene  (not MRSA) by Lumoidigene multiplex nucleic acid test. The mecA gene was  not detected. Final identification and antimicrobial susceptibility  testing will be verified by standard methods.    Specimen tested with Verigene multiplex, gram-positive blood culture  nucleic acid test for the following targets: Staph aureus, Staph  epidermidis, Staph lugdunensis, other Staph species, Enterococcus  faecalis, Enterococcus faecium, Streptococcus species, S. agalactiae,  S. anginosus grp., S. pneumoniae, S. pyogenes, Listeria sp., mecA  (methicillin resistance) and Ernst/B (vancomycin resistance).    Critical Value/Significant Value called to and read back  by Sha Fontenot, Rn 4075 11.15.20 [facility]     11/14/2020 (A)  Preliminary    Cultured on the 1st day of incubation:  Staphylococcus aureus  Susceptibility testing done on previous specimen     11/14/2020   Preliminary    Critical Value/Significant Value, preliminary result only, called to and read back by  PATRICIO SHANNON, RN [id number] 11.15.20 [facility]     09/21/2020 No growth  Final   09/21/2020 No growth  Final   03/13/2020 No growth  Final   03/13/2020 No growth  Final   03/03/2020 No growth  Final   03/03/2020 No growth  Final   02/22/2020 No growth  Final   02/22/2020 No growth  Final   02/16/2020 No growth  Final    Specimen Description   Date Value Ref Range Status   11/16/2020 Wound Drainage  Final   11/15/2020 Blood Right Hand  Final   11/15/2020 Blood Left Arm  Final   11/15/2020 Feces  Final   11/14/2020 Blood Left Hand  Final   11/14/2020 Blood Left Arm  Final   09/21/2020 Blood Left Arm  Final   09/21/2020 Blood Right Arm  Final   03/13/2020 Blood Left Hand  Final   03/13/2020 Blood Right Hand  Final   03/03/2020 Blood Left Hand  Final   03/03/2020 Blood Right Arm  Final   02/22/2020 Blood Right Hand  Final   02/22/2020 Blood Right Hand  Final   02/19/2020 Nares  Final   02/16/2020 Blood Right Hand  Final        Last check of C difficile  C Diff Toxin B PCR   Date Value Ref Range Status   11/15/2020 Negative NEG^Negative Final     Comment:     Negative: C. difficile target DNA sequences NOT detected, presumed negative   for C.difficile toxin B or the number of bacteria present may be below the   limit of detection for the test.  FDA approved assay performed using [employer] GeneXpert real-time PCR.  A negative result does not exclude actual disease due to C. difficile and may   be due to improper collection, handling and storage of the specimen or the   number of organisms in the specimen is below the detection limit of the assay.       Virology:  Respiratory virus testing    Recent Labs   Lab Test 11/14/20  3804  02/13/20 0334   INFZA  --  Negative   INFZB  --  Negative   IRSV  --  Negative   [id number] Nasopharyngeal  --    SARSCOVRES NEGATIVE  --      Urine Studies     Recent Labs   Lab Test 02/22/20  0944 02/13/20 0334 02/07/20 2029   URINEPH 5.5 6.0 5.0   NITRITE Negative Negative Negative   LEUKEST Small* Small* Negative   WBCU 2 81* 3     Imaging:  Recent Results (from the past 48 hour(s))   US Abdomen Complete    Narrative    EXAMINATION: US ABDOMEN COMPLETE, 11/15/2020 9:20 AM     COMPARISON: 2/8/2020    HISTORY: Concern for abscess, patient unable to get CT scan due to  elevated creatinine    TECHNIQUE: The abdomen was scanned in standard fashion with  specialized ultrasound transducer(s) using both gray-scale and limited  color Doppler techniques.    FINDINGS:    Liver: The liver demonstrates normal homogeneous echotexture. The  liver measures 19.7 cm in craniocaudal dimension. No evidence of a  focal hepatic mass. The main portal vein is patent with antegrade  flow. Gallbladder: There is no wall thickening, positive sonographic  Yoon's sign or evidence for cholelithiasis.    Bile Ducts: Both the intra- and extrahepatic biliary system are of  normal caliber. The common bile duct measures 3.7 mm in diameter.    Pancreas: Not well visualized.    Kidneys: Both kidneys are of normal echotexture, without mass or  hydronephrosis. The craniocaudal dimensions are: right- 10.5, left-  11.5.    Spleen: The spleen is normal in size, measuring 11.1 in sagittal  dimension.    Aorta and IVC: The visualized portions of the aorta and IVC are  unremarkable. The proximal aorta measures 2.7 cm in diameter and the  IVC measures 2.4 cm in diameter.    Fluid: Small volume of ascites. Partially imaged right pleural  effusion.        Impression    IMPRESSION:   1. Hepatomegaly with small volume ascites. No intra-abdominal abscess  visualized. If there is continued clinical concern for abscess,  consider noncontrast CT scan of the  abdomen/pelvis.  2. Partially imaged right pleural effusion, likely trace.    I have personally reviewed the examination and initial interpretation  and I agree with the findings.    DEION FLANAGAN MD   CT Abdomen Pelvis w/o Contrast    Narrative    EXAMINATION: CT ABDOMEN PELVIS W/O CONTRAST, 11/15/2020 2:53 PM    TECHNIQUE:  Helical CT images from the lung bases through the pubic  symphysis were obtained  without IV contrast.     COMPARISON: Same-day ultrasound, CT 2/8/2020    HISTORY: Abd pain, fever, abscess suspected; Patient with VAD and  driveline infection (S. aureus infection/MSSA) and now complicated  with bacteremia, please evaluate for abscesses/intraabdominal  infectious process/cutaneous-soft tissues skin infections    FINDINGS:    Abdomen and pelvis:     Homogenous hepatic parenchyma, without focal liver lesion. No  calcified gallstones. No intra or extrahepatic biliary dilatation.  Moderate fatty replacement of the pancreatic parenchyma, without focal  pancreatic lesion or ductal dilatation. Unremarkable spleen. Mild  thickening of the adrenal glands bilaterally, without focal nodule,  similar to prior. Unremarkable noncontrast appearance of the kidneys,  without hydronephrosis or nephrolithiasis. The urinary bladder is  distended and otherwise unremarkable. Unremarkable prostate and  seminal vesicles.    No abnormally dilated loop of small or large bowel. Small to moderate  volume simple ascites within the abdomen and pelvis, slightly  increased in comparison to CT performed in February. No defined fluid  collection is noted to suggest abscess. No intraperitoneal free air is  noted. Small fat-containing umbilical and left inguinal hernias.  Vascular patency cannot be assessed on these noncontrast images,  however there is no evidence of infrarenal abdominal aortic aneurysm.  No pathologically enlarged thoracic lymph nodes.    Lung bases:  Partially visualized postsurgical changes of  LVAD  placement. Patency cannot be assessed on these noncontrast images. No  air or fluid collection along the drive line. Trace bilateral pleural  effusions and atelectatic changes, otherwise the lung bases are clear.  Stable cardiomegaly. Small sliding type hiatal hernia.    Bones and soft tissues: No acute or aggressive appearing osseous  lesion. Stepwise grade 1 anterolisthesis of L3 on L4, and L4 on L5,  with associated loss of intervertebral disc space at each level.  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 Mild diffuse body  wall edema.      Impression    IMPRESSION:   1. Small to moderate volume simple intra-abdominal ascites, slightly  increased in volume as compared to CT performed on 2020,  compatible with third spacing of fluid. No defined fluid collection is  identified to suggest abscess. No additional acute findings within the  abdomen/pelvis.  2. Trace bilateral pleural effusions, and associated bibasilar  atelectasis. The lung bases are otherwise clear.  3. Postprocedural changes of LVAD placement. The components are  unremarkable on these noncontrast images, without associated air or  inflammatory stranding to suggest drive line infection.     I have personally reviewed the examination and initial interpretation  and I agree with the findings.    OSITO BRAVO MD   Echo Complete    Narrative    [id number]  [id number]  [id number]  [id number]^PHYLLIS NAIR^CONNIE^YODIT           [facility],[place]  Echocardiography Laboratory  [address] [place] [id number]     Name: WOLFGANG LEACH  MRN: [id number]  : 1955  Study Date: 2020 10:56 AM  Age: 65 yrs  Gender: Male  Patient Location: [facility] [place]  Reason For Study: Endocarditis  Ordering Physician: CONNIE JIN  Performed By: Nelly Monte RDCS     BSA: 2.1 m2  Height: 67 in  Weight: 220 lb  HR: 118  BP: 97/67  mmHg  _____________________________________________________________________________  __        Procedure  LVAD Echocardiogram with two-dimensional, color and spectral Doppler  performed. Technically difficult study.Extremely poor acoustic windows.  Limited information was obtained during study.  _____________________________________________________________________________  __        Interpretation Summary  Technically difficult study.Extremely poor acoustic windows.  Limited information was obtained during study.  LVAD cannula was seen in the expected anatomic position in the LV apex.  HM3 at 5500RPM.  LVIDd 48mm.  Septum probably normal.  Aortic valve cannot be assessed.  Flow velocities not obtained.  Dilation of the inferior vena cava is present with abnormal respiratory  variation in diameter.  No pericardial effusion is present.  _____________________________________________________________________________  __        Left Ventricle  The Ejection Fraction is estimated at <20%. LVAD cannula was seen in the  expected anatomic position in the LV apex. HM3 at 5500RPM.  LVIDd 48mm.  Septum probably normal.  Aortic valve cannot be assessed.  Flow velocities not obtained.     Right Ventricle  Right ventricular function cannot be assessed due to poor image quality.     Vessels  Dilation of the inferior vena cava is present with abnormal respiratory  variation in diameter.     Pericardium  No pericardial effusion is present.     _____________________________________________________________________________  __  MMode/2D Measurements & Calculations  IVSd: 0.92 cm  LVIDd: 4.8 cm  LVIDs: 4.6 cm  LVPWd: 0.88 cm     FS: 4.6 %  LV mass(C)d: 147.6 grams  LV mass(C)dI: 70.1 grams/m2  asc Aorta Diam: 3.5 cm  RWT: 0.37           _____________________________________________________________________________  __           Report approved by: Graciela Tomlinson 11/16/2020 11:39 AM

## 2020-11-17 NOTE — PROGRESS NOTES
Care Management Follow Up    Length of Stay (days): 3    Expected Discharge Date: 11/19/20     Concerns to be Addressed:       Patient plan of care discussed at interdisciplinary rounds: Yes    Anticipated Discharge Disposition:  TBD     Anticipated Discharge Services:  Home infusion vs outpatient infusion center  Anticipated Discharge DME:  N/A    Patient/family educated on Medicare website which has current facility and service quality ratings:  N/A  Education Provided on the Discharge Plan:  Yes  Patient/Family in Agreement with the Plan:  TBD    Additional Information:  Pt status reviewed/discussed during care team rounds. Writer reviewed anticipated discharge plan with Cintia WALDRON.  Reviewed/discussed options of home infusion vs possible outpatient infusion center option.     Writer confirmed with H[facility] MONTES [facility] [phone], Fax [phone].  [facility] that they are able to manage BID IV antibiotics as an outpatient.   Facility would need antibiotic order, labs and provider responsible for f/u.    Writer spoke with PARISA Calvin Liaison regarding pt situation.  PARISA will explore out of pocket cost with Nasrin Brock care.  Cintia WALDRON spoke with pt who plans to f/u with pt wife regarding outpatient infusion vs home with home infusion.       CM will continue to monitor.     Discharge orders updated:   Outpatient Infusion:  [facility]  [address] [place] [address]  [phone], Fax [phone]  Daily IV antibiotics at 6 a.m., 2 p.m. and 10 p.m.  -Line Care per hospital protocol.        Sarah Houser RN

## 2020-11-17 NOTE — DISCHARGE SUMMARY
[facility]     Cardiology Discharge Summary- Cards 2         Date of Admission:  11/14/2020  Date of Discharge:  11/19/2020  Discharging Provider: Dr. Nader Cisneros    Discharge Diagnoses   Previous MSSA driveline infection  Staphylococcus aureus Bacteremia (Likely MSSA), driveline infection  NICM LVEF 15-20%, NYHA III s/p HM III LVAD 2/2020  Urinary voiding dysfunction  Atrial Fibrillation, VT/VF   Supratherapeutic INR  CKD (chronic kidney disease) stage 4  Microcytic anemia  DMII    Follow-ups Needed After Discharge   - BMP, CBC, INR check after discharge (anticoagulation clinic), 11/23 on Monday   - Cardiology OP appt  - ID OP appt  - PCP in 1 week after discharge    Discharge Disposition   Discharged to home  Condition at discharge: Good    Hospital Course      Mr. Tanner is a 65 year old male who has a past medical history significant for NICM LVEF 15-20% s/p LVAD HM3 2/18/20 c/b by MSSA driveline infection (11/5/20), moderate nonobstructive CAD, severe MR, HTN, ABHINAV (uses CPAP), DM2, CKD III, HLD, ANA, and prior tobacco use who was admitted directly from ID clinic with GPC bacteremia, likely MSSA due to drive-line infection.    Please see H&P for further details.      #Previous MSSA driveline infection  #Staphylococcus aureus Bacteremia (Likely MSSA), driveline infection  #Diarrhea, resolved  Initially developed pain followed by drainage near LVAD driveline around ~11/1, started on doxycycline on 11/5 but switched to TMP/SMX when the culture revealed MSSA resistant to doxy. After starting TMP/SMX (11/5-11/14) there was some improvement in the pain/drainage although not resolved. On 11/14, patient directly admitted by ID from clinic after BCx positive for MSSA.  Of note, patient did have a few days of diarrhea which had improved on day of admission and resolved since 11/15 (C.diff negative). Abdominal imaging (US and CT abd/pelvis) revealed no intraabdominal  fluid collection concerning for abscess/infectious process. TTE did not comment on vegetation. CHAMP also did not show any signs of endocarditis.  Last positive BCx on 11/14 showed MSSA, BCx 11/15-11/17 NGTD.     Will leave with PICC, to continue with Cefazolin 2g IV q8hrs (home infusion set up, will do intermittent pushes TID every day for 6 weeks with the help of his wife) per ID recs (11/15 - 12/27/2020) for a total of 6 weeks. Per SW, the continuous pump would be more difficult for teaching and would require more expense out of pocket from patient, and patient's wife stated that she felt comfortable administering TID cefazolin every day for 6 weeks (they are already paying ~$1200/6 weeks for home infusion). Per ID, will need suppressive abx afterwards, but ID will help with this after ending Cefazolin. Sent email to LVAD coordinators to help schedule OP appt for ID in 5-6 weeks and cardiology/CHF LVAD clinic in 2-4 weeks after discharge.     #Hypotensive episode  Had BP 77/60, afebrile, , WBC 17 11/17 AM which triggered sepsis protocol. Lactate was WNL 1.2. Though the increase in WBC from 15-->17 is concerning, intermittently low Bps are expected in LVAD patients. There is concern for potential LVAD lead/cord infection, but reassuring that BCx since 11/15 NGTD and CHAMP showed no signs of infection. UA negative. Prior to discharge, MAP 70-84, with /63, afebrile, WBC 13. Follow up with PCP in 1 week with f/u CBC with diff and BMP on 11/23.    #Urinary voiding dysfunction  Has been having issues of urinary voiding 11/17 AM.  mL and  mL. Resolved with intermittent cath. US renal on 11/17 showed no hydronephrosis bilaterally. UA negative for UTI. Most likely due to undiagnosed BPH, will trial tamsulosin 0.4 mg daily and finasteride 5 mg daily. No lombardo (since high infection risk in LVAD patients).     #NICM LVEF 15-20%, NYHA III s/p HM III LVAD 2/2020  - ACEi/ARB: PTA Lisinopril 10mg daily   -  Fluid status: Hypervolemic     > Diuresis: stopped PTA Furosemide 40 mg daily (continue Bumetanide 2mg per oral BID)  - Afterload reduction: PTA Hydralazine 100mg per oral TID  - BB: Not on d/t RV dysfunction   - Aldosterone antagonist: Not on due to variable renal function  - SCD prophylaxis: s/p ICD stable lead parameters and normal device function   - Fluid status: Euvolemia upon discharge     #Atrial Fibrillation, VT/VF (goal INR 2-3).   S/p DCCV with some residual self-limiting AF. Continue PTA amiodarone 200mg daily. Will place anticoagulation OP clinic and INR check after discharge, scheduled on 11/23.      #Supratherapeutic INR  On admission, INR elevated to 8.2. No symptoms/signs of active bleeding. Suspect elevated INR d/t polypharmacy including recent addition of TMP/SMX/Amiodarone and poor appetite.  INR trended down after giving 1x PO vit K, INR on 11/16 4.38. Held warfarin while inpatient, but placed anticoagulation OP appt clinic and follow up [place] [facility] [place] [facility], and can restart PTA warfarin when deemed safe. Until 11/23, patient will be taking warfarin 3 mg. Patient's INR will be faxed to his PCP Dr. Adan in [place], and will be adjusted per Dr. Adan.     #CKD (chronic kidney disease) stage 4, GFR 15-29 ml/min (H) with progressive creatinine rise in last 1-2 months  Follows with nephrology as outpatient. CKD pre-dated VAD. Initially d/t HTN +/- DM as this was more recently diagnosed. Also required iHD post cardiac arrest. HIV/HBV/HCV negative, SPEP negative, hemolysis unlikely. Previous UA non-nephritc. To this day, no clear etiology has been identified. Nephrology thinks that perhaps this new Cr increase may be due to the following possibilities: HTN pre-dating VAD+needing HD for BERNARDA s/p MI. There may be a component of increased systemic vascular resistance promoting a pathological process within the spectrum of cardiorenal etiologies. Will continue to optimize afterload  reducing medications.      #Microcytic anemia  Multifactorial and d/t likely ACD and mild hemolysis from VAD. There is a ~40% increased risk of GI bleeding within 2 years of LVAD placement. No active signs of bleeding. Iron study WNL.      # Diabetes Mellitus type II  Can continue PTA 10 units of glargine  at bedtime.     Consultations This Hospital Stay   INFECTIOUS DISEASE GENERAL ADULT IP CONSULT  VASCULAR ACCESS CARE ADULT IP CONSULT  PHARMACY TO DOSE WARFARIN  PHYSICAL THERAPY ADULT IP CONSULT  OCCUPATIONAL THERAPY ADULT IP CONSULT  CARE MANAGEMENT / SOCIAL WORK IP CONSULT  SMOKING CESSATION PROGRAM IP CONSULT    Code Status   Full Code        This patient was staffed and discussed with Dr. Cisneros.    Jess Meraz MD  PGY2  [facility] resident  [phone]  ______________________________________________________________________    Physical Exam   Vital Signs: Temp: 97.8  F (36.6  C) Temp src: Oral BP: 109/72 Pulse: 119   Resp: 18 SpO2: 95 % O2 Device: None (Room air)    Weight: 214 lbs 9.6 oz  General: Well-nourished, NAD, lying in bed comfortable, speaking in complete sentences, appropriate   Eyes: PERRL, conjunctivae pink no scleral icterus  ENT: Moist mucus membranes  Respiratory: Lungs clear to auscultation bilaterally, no crackles/rubs/wheezes. Good air movement  CV: Continous flow murmur (LVAD), no LE/sacral edema  GI:  Abdomen distended. Clean dressing over driveline, no surrounding erythema. Nontender to palpation. No guarding or rebound.   Skin: Warm, dry. No rashes or petechiae  Musculoskeletal: No peripheral edema or calf tenderness  Neuro: Alert and oriented to person/place/time  Psychiatric: Normal affect    Primary Care Physician   Dr. Adan    Significant Results and Procedures   Results for orders placed or performed during the hospital encounter of 11/14/20   US Abdomen Complete    Narrative    EXAMINATION: US ABDOMEN COMPLETE, 11/15/2020 9:20 AM     COMPARISON: 2/8/2020    HISTORY: Concern for  abscess, patient unable to get CT scan due to  elevated creatinine    TECHNIQUE: The abdomen was scanned in standard fashion with  specialized ultrasound transducer(s) using both gray-scale and limited  color Doppler techniques.    FINDINGS:    Liver: The liver demonstrates normal homogeneous echotexture. The  liver measures 19.7 cm in craniocaudal dimension. No evidence of a  focal hepatic mass. The main portal vein is patent with antegrade  flow. Gallbladder: There is no wall thickening, positive sonographic  Yoon's sign or evidence for cholelithiasis.    Bile Ducts: Both the intra- and extrahepatic biliary system are of  normal caliber. The common bile duct measures 3.7 mm in diameter.    Pancreas: Not well visualized.    Kidneys: Both kidneys are of normal echotexture, without mass or  hydronephrosis. The craniocaudal dimensions are: right- 10.5, left-  11.5.    Spleen: The spleen is normal in size, measuring 11.1 in sagittal  dimension.    Aorta and IVC: The visualized portions of the aorta and IVC are  unremarkable. The proximal aorta measures 2.7 cm in diameter and the  IVC measures 2.4 cm in diameter.    Fluid: Small volume of ascites. Partially imaged right pleural  effusion.        Impression    IMPRESSION:   1. Hepatomegaly with small volume ascites. No intra-abdominal abscess  visualized. If there is continued clinical concern for abscess,  consider noncontrast CT scan of the abdomen/pelvis.  2. Partially imaged right pleural effusion, likely trace.    I have personally reviewed the examination and initial interpretation  and I agree with the findings.    DEION FLANAGAN MD   CT Abdomen Pelvis w/o Contrast    Narrative    EXAMINATION: CT ABDOMEN PELVIS W/O CONTRAST, 11/15/2020 2:53 PM    TECHNIQUE:  Helical CT images from the lung bases through the pubic  symphysis were obtained  without IV contrast.     COMPARISON: Same-day ultrasound, CT 2/8/2020    HISTORY: Abd pain, fever, abscess suspected;  Patient with VAD and  driveline infection (S. aureus infection/MSSA) and now complicated  with bacteremia, please evaluate for abscesses/intraabdominal  infectious process/cutaneous-soft tissues skin infections    FINDINGS:    Abdomen and pelvis:     Homogenous hepatic parenchyma, without focal liver lesion. No  calcified gallstones. No intra or extrahepatic biliary dilatation.  Moderate fatty replacement of the pancreatic parenchyma, without focal  pancreatic lesion or ductal dilatation. Unremarkable spleen. Mild  thickening of the adrenal glands bilaterally, without focal nodule,  similar to prior. Unremarkable noncontrast appearance of the kidneys,  without hydronephrosis or nephrolithiasis. The urinary bladder is  distended and otherwise unremarkable. Unremarkable prostate and  seminal vesicles.    No abnormally dilated loop of small or large bowel. Small to moderate  volume simple ascites within the abdomen and pelvis, slightly  increased in comparison to CT performed in February. No defined fluid  collection is noted to suggest abscess. No intraperitoneal free air is  noted. Small fat-containing umbilical and left inguinal hernias.  Vascular patency cannot be assessed on these noncontrast images,  however there is no evidence of infrarenal abdominal aortic aneurysm.  No pathologically enlarged thoracic lymph nodes.    Lung bases:  Partially visualized postsurgical changes of LVAD  placement. Patency cannot be assessed on these noncontrast images. No  air or fluid collection along the drive line. Trace bilateral pleural  effusions and atelectatic changes, otherwise the lung bases are clear.  Stable cardiomegaly. Small sliding type hiatal hernia.    Bones and soft tissues: No acute or aggressive appearing osseous  lesion. Stepwise grade 1 anterolisthesis of L3 on L4, and L4 on L5,  with associated loss of intervertebral disc space at each level.  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 Mild diffuse  body  wall edema.      Impression    IMPRESSION:   1. Small to moderate volume simple intra-abdominal ascites, slightly  increased in volume as compared to CT performed on 2/8/2020,  compatible with third spacing of fluid. No defined fluid collection is  identified to suggest abscess. No additional acute findings within the  abdomen/pelvis.  2. Trace bilateral pleural effusions, and associated bibasilar  atelectasis. The lung bases are otherwise clear.  3. Postprocedural changes of LVAD placement. The components are  unremarkable on these noncontrast images, without associated air or  inflammatory stranding to suggest drive line infection.     I have personally reviewed the examination and initial interpretation  and I agree with the findings.    OSITO BRAVO MD   US Renal Complete    Narrative    EXAMINATION: US RENAL COMPLETE, 11/17/2020 1:43 PM     COMPARISON: None.    HISTORY: Rule out obstruction    TECHNIQUE: The kidneys and bladder were scanned in the standard  fashion with specialized ultrasound transducer(s) using both gray  scale and limited color/spectral Doppler techniques.    FINDINGS:    Right kidney: Measures 10.5 cm in length. Parenchyma is of normal  thickness and echogenicity. No focal mass. No hydronephrosis.    Left kidney: Measures 11.6 cm in length. Parenchyma is of normal  thickness and echogenicity. No focal mass. No hydronephrosis.     Bladder: Decompressed with Guevara catheter.      Impression    IMPRESSION:  1.  Normal renal ultrasound study.    OSITO BRAVO MD   XR Chest Port 1 View    Narrative    Exam: XR CHEST PORT 1 VW, 11/18/2020 2:19 PM    Indication: PICC Placement    Comparison: 9/21/2020    Findings:   Right upper extremity PICC tip at the mid to low SVC. Left chest wall  single lead automatic implantable cardiac defibrillator. Stable LVAD.  Stable enlarged cardiac silhouette. The pulmonary vasculature is  within normal limits. No pleural effusion or pneumothorax. No  focal  airspace opacity. Low lung volumes.      Impression    Impression: Right upper extremity PICC tip at the mid to low SVC.    CHARLES HERNANDEZ,    Echo Complete    Narrative    [id number]  [id number]  [id number]  [id number]^PHYLLIS NAIR^CONNIE^YODIT           [facility]  Echocardiography Laboratory  [address] [place] [address]     Name: WOLFGANG LEACH  MRN: [id number]  : 1955  Study Date: 2020 10:56 AM  Age: 65 yrs  Gender: Male  Patient Location: U[facility]6C  Reason For Study: Endocarditis  Ordering Physician: CONNIE JIN  Performed By: Nelly Monte RDCS     BSA: 2.1 m2  Height: 67 in  Weight: 220 lb  HR: 118  BP: 97/67 mmHg  _____________________________________________________________________________  __        Procedure  LVAD Echocardiogram with two-dimensional, color and spectral Doppler  performed. Technically difficult study.Extremely poor acoustic windows.  Limited information was obtained during study.  _____________________________________________________________________________  __        Interpretation Summary  Technically difficult study.Extremely poor acoustic windows.  Limited information was obtained during study.  LVAD cannula was seen in the expected anatomic position in the LV apex.  HM3 at 5500RPM.  LVIDd 48mm.  Septum probably normal.  Aortic valve cannot be assessed.  Flow velocities not obtained.  Dilation of the inferior vena cava is present with abnormal respiratory  variation in diameter.  No pericardial effusion is present.  _____________________________________________________________________________  __        Left Ventricle  The Ejection Fraction is estimated at <20%. LVAD cannula was seen in the  expected anatomic position in the LV apex. HM3 at 5500RPM.  LVIDd 48mm.  Septum probably normal.  Aortic valve cannot be assessed.  Flow velocities not obtained.     Right Ventricle  Right ventricular  function cannot be assessed due to poor image quality.     Vessels  Dilation of the inferior vena cava is present with abnormal respiratory  variation in diameter.     Pericardium  No pericardial effusion is present.     _____________________________________________________________________________  __  MMode/2D Measurements & Calculations  IVSd: 0.92 cm  LVIDd: 4.8 cm  LVIDs: 4.6 cm  LVPWd: 0.88 cm     FS: 4.6 %  LV mass(C)d: 147.6 grams  LV mass(C)dI: 70.1 grams/m2  asc Aorta Diam: 3.5 cm  RWT: 0.37           _____________________________________________________________________________  __           Report approved by: Graciela Tomlinson 2020 11:39 AM      Transesophageal Echocardiogram    Narrative    [id number]  [id number]  [id number]  [id number]^GLORIA^ANIKA           [facility]  Echocardiography Laboratory  [address] [place] [id number]        Name: WOLFGANG LEACH  MRN: [id number]  : 1955  Study Date: 2020 09:03 AM  Age: 65 yrs  Gender: Male  [place] [facility] [place] For Study: Bacteremia  History: Sepsis  Ordering Physician: ANIKA CASTRO  Performed By: Maciel House     BSA: 2.1 m2  Height: 67 in  Weight: 210 lb  HR: 116  BP: 69/39 mmHg  Attestation  I was present during CHAMP probe placement by the fellow, Maciel House. I  personally viewed the imaging and agree with the interpretation and report as  documented by the fellow.  _____________________________________________________________________________  __     Interpretation Summary  S/P HM 3 LVAD. LVAD inflow cannula with normal doppler.     No vegetations on valves or ICD wire.     _____________________________________________________________________________  __        Procedure  Transesophageal Echocardiogram with color and spectral Doppler performed.  Procedure location Echo Lab. The procedure was performed in the Echo Lab. CHAMP  Probe #58 was used during the procedure. Patient was  sedated using Fentanyl  100 mcg. Patient was sedated using Versed 2 mg. The heart rate, respiratory  rate, oxygen saturations, blood pressure, and response to care were monitored  throughout the procedure with the assistance of the nurse. I determined this  patient to be an appropriate candidate for the planned sedation and procedure  and have reassessed the patient immediately prior to sedation and procedure.  Total sedation time: 34 minutes of continuous bedside 1:1 monitoring. The  Transducer was inserted without difficulty . The patient tolerated the  procedure well. Complications None. Informed consent for Transesophegeal echo  obtained. The patient's rhythm is paced. Good quality two-dimensional was  performed and interpreted. Good quality color and spectral Doppler were  performed and interpreted.     Left Ventricle  Mild left ventricular dilation is present. Severely (EF 10-20%) reduced left  ventricular function is present. LVAD cannula was seen in the expected  anatomic position in the LV apex. S/P HM 3 LVAD 5500 RPM. Left ventricular  wall thickness is normal.     Right Ventricle  Moderate right ventricular dilation is present. Global right ventricular  function is moderately reduced. A pacemaker lead is noted in the right  ventricle. No vegetations seen on Single lead ICD wire.     Atria  The left atrial appendage is normal. It is free of spontaneous echo contrast  and thrombus. Severe biatrial enlargement is present. The atrial septum is  intact as assessed by color Doppler .        Mitral Valve  Mitral leaflet thickness is normal. Mild to moderate mitral insufficiency is  present.     Aortic Valve  The aortic valve is tricuspid. The aortic valve remains closed with every  beat. Mild aortic valve sclerosis is present. Mild aortic insufficiency is  present.     Tricuspid Valve  The tricuspid valve is normal. Mild to moderate tricuspid insufficiency is  present.     Pulmonic Valve  The pulmonic valve is  normal. Trace pulmonic insufficiency is present.     Vessels  The thoracic aorta is normal. Ascending aorta 2.9 cm.        Pericardium  No pericardial effusion is present.     Compared to Previous Study  This study was compared with the study from 11/16/2020 . There has been no  change.     _____________________________________________________________________________  __                       Report approved by: Graciela Dubose 11/19/2020 11:27 AM           _____________________________________________________________________________  __            Discharge Medications   Current Discharge Medication List      CONTINUE these medications which have NOT CHANGED    Details   acetaminophen (TYLENOL) 325 MG tablet Take 2 tablets (650 mg) by mouth every 4 hours as needed for mild pain or fever  Qty: 100 tablet, Refills: 0    Associated Diagnoses: LVAD (left ventricular assist device) present (H)      albuterol (PROAIR HFA/PROVENTIL HFA/VENTOLIN HFA) 108 (90 Base) MCG/ACT inhaler Inhale 2 puffs into the lungs every 6 hours as needed for wheezing  Qty: 1 Inhaler, Refills: 1    Comments: Pharmacy may dispense brand covered by insurance (Proair, or proventil or ventolin or generic albuterol inhaler)  Associated Diagnoses: LVAD (left ventricular assist device) present (H)      allopurinol (ZYLOPRIM) 100 MG tablet 2 tablets (200 mg) by Oral or Feeding Tube route daily  Qty: 60 tablet, Refills: 1    Associated Diagnoses: LVAD (left ventricular assist device) present (H)      amiodarone (PACERONE) 200 MG tablet Take 1 tablet (200 mg) by mouth daily  Qty: 90 tablet, Refills: 3    Associated Diagnoses: LVAD (left ventricular assist device) present (H)      aspirin (ASA) 81 MG EC tablet Take 1 tablet (81 mg) by mouth daily  Qty: 90 tablet, Refills: 3    Associated Diagnoses: LVAD (left ventricular assist device) present (H)      atorvastatin (LIPITOR) 20 MG tablet Take 20 mg by mouth daily      co-enzyme Q-10 200 MG CAPS 200  mg by Oral or Feeding Tube route daily      FLUoxetine (PROZAC) 20 MG capsule Take 1 capsule (20 mg) by mouth daily  Qty: 30 capsule, Refills: 1    Associated Diagnoses: LVAD (left ventricular assist device) present (H)      furosemide (LASIX) 20 MG tablet Take 2 tablets (40 mg) by mouth daily  Qty: 90 tablet, Refills: 3    Associated Diagnoses: LVAD (left ventricular assist device) present (H)      hydrALAZINE (APRESOLINE) 25 MG tablet Take 4 tablets (100 mg) by mouth 3 times daily  Qty: 810 tablet, Refills: 3    Associated Diagnoses: Chronic systolic congestive heart failure (H)      insulin glargine (BASAGLAR KWIKPEN) 100 UNIT/ML pen Inject 12 Units Subcutaneous At Bedtime    Comments: If Basaglar is not covered by insurance, may substitute Lantus at same dose and frequency.        lisinopril (ZESTRIL) 10 MG tablet Take 1 tablet (10 mg) by mouth daily  Qty: 90 tablet, Refills: 3    Associated Diagnoses: LVAD (left ventricular assist device) present (H)      magnesium oxide (MAG-OX) 400 MG tablet 1 tablet (400 mg) by Oral or Feeding Tube route daily  Qty: 30 tablet, Refills: 1    Associated Diagnoses: LVAD (left ventricular assist device) present (H)      multivitamin w/minerals (THERA-VIT-M) tablet Take 1 tablet by mouth daily  Qty: 30 tablet, Refills: 1    Associated Diagnoses: LVAD (left ventricular assist device) present (H)      omeprazole (PRILOSEC) 20 MG DR capsule Take 20 mg by mouth daily      potassium chloride ER (KLOR-CON M) 20 MEQ CR tablet Take 20 mEq by mouth daily      sulfamethoxazole-trimethoprim (BACTRIM) 400-80 MG tablet TAKE 1 TABLET BY MOUTH TWICE DAILY FOR FOURTEEN DAYS (STOP TAKING DOXYCYCLINE)      warfarin ANTICOAGULANT (COUMADIN) 4 MG tablet Take 4 mg by mouth daily      zolpidem (AMBIEN) 5 MG tablet Take 5 mg by mouth nightly as needed for Insomnia      insulin pen needle (BD JAIME U/F) 32G X 4 MM miscellaneous          STOP taking these medications       meclizine (ANTIVERT) 25 MG  tablet Comments:   Reason for Stopping:         methocarbamol (ROBAXIN) 500 MG tablet Comments:   Reason for Stopping:         ondansetron (ZOFRAN) 4 MG tablet Comments:   Reason for Stopping:             Allergies   Allergies   Allergen Reactions     Heparin      HIT screen positive 2/14/20, reflex DAVINA negative; however heme recommended treating as if positive  HIT screen negative 2/11/20     Oxycodone Itching and Other (See Comments)     Chlorhexidine Rash

## 2020-11-17 NOTE — PLAN OF CARE
OT 6C: Cancel and DEFER.  Acknowledge orders placed for OT eval and treat.  Upon chart review and discussion with PT/RN, no acute OT needs are identified.  Pt is independent with mobility and self cares.  Safe discharge plan in place.  Will discontinue OT orders.

## 2020-11-17 NOTE — PROGRESS NOTES
[facility]     Cardiology Progress Note- Cards 2        Date of Admission:  11/14/2020     Assessment & Plan: HVSL   Mr. Tanner is a 65 year old male who has a past medical history significant for NICM LVEF 15-20% s/p LVAD HM3 2/18/20 c/b by MSSA driveline infection (11/5/20), moderate nonobstructive CAD, severe MR, HTN, ABHINAV (uses CPAP), DM2, CKD III, HLD, ANA, and prior tobacco use who was admitted directly from ID clinic with GPC bacteremia, likely MSSA.     Changes today:  - will place PICC in 11/18 AM after BCx continue to be negative  - continue cefazolin 2g IV q8hrs  - SW will help set up home infusion teaching prior to discharge  - will be on IV abx for 6 weeks   - no need for CHAMP now  - started tamsulosin and finesteride-voiding issues  - US renal: no hydronephrosis bilaterally  - UA negative for UTI  - DISPO: patient wants to go home with home infusions IV abx    #Previous MSSA driveline infection  #Staphylococcus aureus Bacteremia (Likely MSSA), driveline infection  #Diarrhea, resolved  Initially developed pain followed by drainage ~11/1, started on doxycycline on 11/5 but switched to TMP/SMX when the culture revealed MSSA resistant to doxy-subsequently. Blood cultures obtained which were negative. After starting TMP/SMX there was some improvement in the pain/drainage although not resolved.1/14 directly admitted by ID after BC positive for GPC, likely MSSA. Discussed with transplant ID who recommend repeat blood cultures, abdominal ultrasound of driveline site, and starting Cefazolin. Of note, patient did have a few days of diarrhea which had improved on day of admission and resolved since 11/15 (C.diff negative).    Most recently, abdominal imaging (US and CT abd/pelvis) revealed no intraabdominal fluid collection concerning for abscess/infectious process. TTE did not comment on vegetation. We do not feel strongly about CHAMP, not accurate for detecting  lead/cord infections and would not  of abx duration.    - Transplant ID following, appreciate recs   - BCx2- 11/15,11/16,11/17 NGTD (pending)    > Repeat until negative for 48-72 hrs, then can place PICC line (perhaps placement 11/18)  - Stopped bactrim (11/5 - 11/14)   - Started Cefazolin 2g IV q8hrs per ID recs (11/14 - 11/*)  - SW will help set up home infusion teaching prior to discharge  - will be on IV abx for 6 weeks   - C.diff negative    #Hypotensive episode  Had BP 77/60, afebrile, , WBC 17 11/17 AM which triggered sepsis protocol. Lactate was WNL 1.2. Though the increase in WBC from 5-->17 is concerning, intermittently low Bps are expected in LVAD patients. There is concern for potential LVAD lead/cord infection, but will monitor, reassuring that BC since 11/15 NGTD. UA negative.  - monitor BCx    #Inability to void  Has been having issues of urinary voiding 11/17 AM.  mL and  mL. Resolved with intermittent cath. US renal showed no hydronephrosis bilaterally. UA negative for UTI. Most likely due to undiagnosed BPH, will trial tamsulosin and finasteride.  - No lombardo (since high infection risk in LVAD patients)  - intermittent cath  - PVR PRN  - start tamsulosin 0.4 mg daily   - start finasteride 5 mg daily    #NICM LVEF 15-20%, NYHA III s/p HM III LVAD 2/2020  - ACEi/ARB: Lisinopril 10mg daily   - Fluid status: Hypervolemic     > Diuresis: stopped PTA Furosemide 40 mg daily (continue Bumetanide 2mg per oral BID)  - Afterload reduction: Hydralazine 100mg per oral TID    >> Will likely require more MAP control on discharge (ie. Amlodipine)   - BB: Not on d/t RV dysfunction   - Aldosterone antagonist: Not on due to variable renal function  - SCD prophylaxis: s/p ICD stable lead parameters and normal device function   - Fluid status: Hypervolemic     #Atrial Fibrillation, VT/VF     S/p DCCV with some residual self-limiting AF.   - Amiodarone 200mg daily  - Warfarin,  pharmacy to dose, hold given high INR  - Daily INR (goal 2-3)    #Supratherapeutic INR    > Multifactorial    No symptoms/signs of active bleeding. Suspect elevated INR d/t polypharmacy including recent addition of TMP/SMX/Amiodarone and poor appetite.  INR continues to trend down after giving 1x PO vit K, INR on 11/16 4.38.  - Hold warfarin and TMP/SMX  - s/p one time dose of 1mg of oral Vitamin K  - Monitor for signs/symptoms of bleeding   - Monitor INR daily    #CKD (chronic kidney disease) stage 4, GFR 15-29 ml/min (H) with progressive creatinine rise in last 1-2 months  Follows with nephrology as outpatient. CKD Pre-dated VAD. Initially d/t HTN +/- DM as this was more recently diagnosed. Also required iHD post cardiac arrest. HIV/HBV/HCV negative, SPEP negative, hemolysis unlikely. Previous UA non-nephritc. Renal/bladder ultrasound done as outpatient at outside clinic last week, awaiting results, has another scheduled for 12/15/2020. To this day, no clear etiology has been identified. Nephrology thinks that perhaps this new Cr increase may be due to the following possibilities: HTN pre-dating VAD+needing HD for BERNARDA s/p MI. There may be a component of increased systemic vascular resistance promoting a pathological process within the spectrum of cardiorenal etiologies. Will continue to optimize afterload reducing medications.   - F/u on OSH renal/bladder ultrasound results   - Avoid nephrotoxic agents  - Daily BMP   - Monitor I/O, electrolytes     #Microcytic anemia  Multifactorial and d/t likely ACD and mild hemolysis from VAD. There is a ~40% increased risk of GI bleeding within 2 years of LVAD placement. No active signs of bleeding. Iron study WNL.  - Daily HGB  - Transfuse <7g/dL    # Diabetes Mellitus type II  - sliding scale insulin  - 10 units of glargine  at bedtime     Diet: Regular  DVT Prophylaxis: Warfarin, held INR supratherapeutic   Code Status: Full  Fluids: PO  Lines: PIV, driveline      Diet:      DVT Prophylaxis: Warfarin  Guevara Catheter: not present  Code Status:   FULL      Disposition Plan   Expected discharge: 4 - 7 days, recommended to prior living arrangement once Infectious Work-up completed and treatment plan established; pending clinical stability.      Entered: Jess Meraz MD 11/17/2020, 3:26 PM       The patient's care was discussed with the Attending Physician, Dr. Nader Cisneros MD PhD.      Jess Meraz MD  PGY2   resident  [phone]  ______________________________________________________________________    Interval History   NAEON. Nursing notes reviewed. Pt denied SOB, chest pain, fevers, chills. Denied bleeding, bloody stools. States that he does not want to go to TCU. Would rather do home infusions for abx.    Data reviewed today: I reviewed all medications, new labs and imaging results over the last 24 hours. I personally reviewed no imaging or EKG's to review for today.     Physical Exam   Vital Signs: Temp: 98.7  F (37.1  C) Temp src: Oral BP: 121/79 Pulse: 116   Resp: 18 SpO2: 96 % O2 Device: None (Room air)    Weight: 214 lbs 9.6 oz  General: Well-nourished, no acute distress, lying in bed, speaking in complete sentences, appropriate   Eyes: PERRL, conjunctivae pink no scleral icterus  ENT: Moist mucus membranes  Respiratory: Lungs clear to auscultation bilaterally, no crackles/rubs/wheezes. Good air movement  CV: Continues flow murmur, no LE/sacral edema  GI:  Abdomen distended. Clean dressing over driveline, no surrounding erythema. Nontender to palpation. No guarding or rebound.   Skin: Warm, dry. No rashes or petechiae  Musculoskeletal: No peripheral edema or calf tenderness  Neuro: Alert and oriented to person/place/time  Psychiatric: Normal affect    Data      11/17/2020  US renal  FINDINGS:     Right kidney: Measures 10.5 cm in length. Parenchyma is of normal  thickness and echogenicity. No focal mass. No hydronephrosis.     Left kidney: Measures 11.6 cm in length.  Parenchyma is of normal  thickness and echogenicity. No focal mass. No hydronephrosis.      Bladder: Decompressed with Guevara catheter.                                                                      IMPRESSION:  1.  Normal renal ultrasound study.    TTE 11/16/2020  Interpretation Summary  Technically difficult study.Extremely poor acoustic windows.  Limited information was obtained during study.  LVAD cannula was seen in the expected anatomic position in the LV apex.  HM3 at 5500RPM.  LVIDd 48mm.  Septum probably normal.  Aortic valve cannot be assessed.  Flow velocities not obtained.  Dilation of the inferior vena cava is present with abnormal respiratory  variation in diameter.  No pericardial effusion is present.      Results for orders placed or performed during the hospital encounter of 11/14/20   US Abdomen Complete     Status: None    Narrative    EXAMINATION: US ABDOMEN COMPLETE, 11/15/2020 9:20 AM     COMPARISON: 2/8/2020    HISTORY: Concern for abscess, patient unable to get CT scan due to  elevated creatinine    TECHNIQUE: The abdomen was scanned in standard fashion with  specialized ultrasound transducer(s) using both gray-scale and limited  color Doppler techniques.    FINDINGS:    Liver: The liver demonstrates normal homogeneous echotexture. The  liver measures 19.7 cm in craniocaudal dimension. No evidence of a  focal hepatic mass. The main portal vein is patent with antegrade  flow. Gallbladder: There is no wall thickening, positive sonographic  Yoon's sign or evidence for cholelithiasis.    Bile Ducts: Both the intra- and extrahepatic biliary system are of  normal caliber. The common bile duct measures 3.7 mm in diameter.    Pancreas: Not well visualized.    Kidneys: Both kidneys are of normal echotexture, without mass or  hydronephrosis. The craniocaudal dimensions are: right- 10.5, left-  11.5.    Spleen: The spleen is normal in size, measuring 11.1 in sagittal  dimension.    Aorta and IVC:  The visualized portions of the aorta and IVC are  unremarkable. The proximal aorta measures 2.7 cm in diameter and the  IVC measures 2.4 cm in diameter.    Fluid: Small volume of ascites. Partially imaged right pleural  effusion.        Impression    IMPRESSION:   1. Hepatomegaly with small volume ascites. No intra-abdominal abscess  visualized. If there is continued clinical concern for abscess,  consider noncontrast CT scan of the abdomen/pelvis.  2. Partially imaged right pleural effusion, likely trace.    I have personally reviewed the examination and initial interpretation  and I agree with the findings.    DEION FLANAGAN MD   CT Abdomen Pelvis w/o Contrast     Status: None    Narrative    EXAMINATION: CT ABDOMEN PELVIS W/O CONTRAST, 11/15/2020 2:53 PM    TECHNIQUE:  Helical CT images from the lung bases through the pubic  symphysis were obtained  without IV contrast.     COMPARISON: Same-day ultrasound, CT 2/8/2020    HISTORY: Abd pain, fever, abscess suspected; Patient with VAD and  driveline infection (S. aureus infection/MSSA) and now complicated  with bacteremia, please evaluate for abscesses/intraabdominal  infectious process/cutaneous-soft tissues skin infections    FINDINGS:    Abdomen and pelvis:     Homogenous hepatic parenchyma, without focal liver lesion. No  calcified gallstones. No intra or extrahepatic biliary dilatation.  Moderate fatty replacement of the pancreatic parenchyma, without focal  pancreatic lesion or ductal dilatation. Unremarkable spleen. Mild  thickening of the adrenal glands bilaterally, without focal nodule,  similar to prior. Unremarkable noncontrast appearance of the kidneys,  without hydronephrosis or nephrolithiasis. The urinary bladder is  distended and otherwise unremarkable. Unremarkable prostate and  seminal vesicles.    No abnormally dilated loop of small or large bowel. Small to moderate  volume simple ascites within the abdomen and pelvis, slightly  increased in  comparison to CT performed in February. No defined fluid  collection is noted to suggest abscess. No intraperitoneal free air is  noted. Small fat-containing umbilical and left inguinal hernias.  Vascular patency cannot be assessed on these noncontrast images,  however there is no evidence of infrarenal abdominal aortic aneurysm.  No pathologically enlarged thoracic lymph nodes.    Lung bases:  Partially visualized postsurgical changes of LVAD  placement. Patency cannot be assessed on these noncontrast images. No  air or fluid collection along the drive line. Trace bilateral pleural  effusions and atelectatic changes, otherwise the lung bases are clear.  Stable cardiomegaly. Small sliding type hiatal hernia.    Bones and soft tissues: No acute or aggressive appearing osseous  lesion. Stepwise grade 1 anterolisthesis of L3 on L4, and L4 on L5,  with associated loss of intervertebral disc space at each level.  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 Mild diffuse body  wall edema.      Impression    IMPRESSION:   1. Small to moderate volume simple intra-abdominal ascites, slightly  increased in volume as compared to CT performed on 2/8/2020,  compatible with third spacing of fluid. No defined fluid collection is  identified to suggest abscess. No additional acute findings within the  abdomen/pelvis.  2. Trace bilateral pleural effusions, and associated bibasilar  atelectasis. The lung bases are otherwise clear.  3. Postprocedural changes of LVAD placement. The components are  unremarkable on these noncontrast images, without associated air or  inflammatory stranding to suggest drive line infection.     I have personally reviewed the examination and initial interpretation  and I agree with the findings.    OSITO BRAVO MD   US Renal Complete     Status: None    Narrative    EXAMINATION: US RENAL COMPLETE, 11/17/2020 1:43 PM     COMPARISON: None.    HISTORY: Rule out obstruction    TECHNIQUE: The kidneys and  bladder were scanned in the standard  fashion with specialized ultrasound transducer(s) using both gray  scale and limited color/spectral Doppler techniques.    FINDINGS:    Right kidney: Measures 10.5 cm in length. Parenchyma is of normal  thickness and echogenicity. No focal mass. No hydronephrosis.    Left kidney: Measures 11.6 cm in length. Parenchyma is of normal  thickness and echogenicity. No focal mass. No hydronephrosis.     Bladder: Decompressed with Guevara catheter.      Impression    IMPRESSION:  1.  Normal renal ultrasound study.    OSITO BRAVO MD   Basic metabolic panel     Status: Abnormal   Result Value Ref Range    Sodium 134 133 - 144 mmol/L    Potassium 4.9 3.4 - 5.3 mmol/L    Chloride 102 94 - 109 mmol/L    Carbon Dioxide 25 20 - 32 mmol/L    Anion Gap 7 3 - 14 mmol/L    Glucose 194 (H) 70 - 99 mg/dL    Urea Nitrogen 35 (H) 7 - 30 mg/dL    Creatinine 1.98 (H) 0.66 - 1.25 mg/dL    GFR Estimate 34 (L) >60 mL/min/[1.73_m2]    GFR Estimate If Black 39 (L) >60 mL/min/[1.73_m2]    Calcium 8.8 8.5 - 10.1 mg/dL   Hepatic panel     Status: Abnormal   Result Value Ref Range    Bilirubin Direct 0.4 (H) 0.0 - 0.2 mg/dL    Bilirubin Total 0.7 0.2 - 1.3 mg/dL    Albumin 2.6 (L) 3.4 - 5.0 g/dL    Protein Total 7.8 6.8 - 8.8 g/dL    Alkaline Phosphatase 184 (H) 40 - 150 U/L    ALT 56 0 - 70 U/L    AST 48 (H) 0 - 45 U/L   INR     Status: Abnormal   Result Value Ref Range    INR 6.21 (HH) 0.86 - 1.14   CBC with platelets     Status: Abnormal   Result Value Ref Range    WBC 18.4 (H) 4.0 - 11.0 10e9/L    RBC Count 5.21 4.4 - 5.9 10e12/L    Hemoglobin 10.5 (L) 13.3 - 17.7 g/dL    Hematocrit 35.7 (L) 40.0 - 53.0 %    MCV 69 (L) 78 - 100 fl    MCH 20.2 (L) 26.5 - 33.0 pg    MCHC 29.4 (L) 31.5 - 36.5 g/dL    RDW 20.7 (H) 10.0 - 15.0 %    Platelet Count 430 150 - 450 10e9/L   Asymptomatic COVID-19 Virus (Coronavirus) by PCR     Status: None    Specimen: Nasopharyngeal   Result Value Ref Range    COVID-19 Virus PCR to [facility] - Source Nasopharyngeal     COVID-19 Virus PCR to [facility] - Result       Test received-See reflex to IDDL test SARS CoV2 (COVID-19) Virus RT-PCR   Glucose by meter     Status: Abnormal   Result Value Ref Range    Glucose 190 (H) 70 - 99 mg/dL   INR     Status: Abnormal   Result Value Ref Range    INR 6.13 (HH) 0.86 - 1.14   Basic metabolic panel     Status: Abnormal   Result Value Ref Range    Sodium 133 133 - 144 mmol/L    Potassium 4.4 3.4 - 5.3 mmol/L    Chloride 102 94 - 109 mmol/L    Carbon Dioxide 21 20 - 32 mmol/L    Anion Gap 10 3 - 14 mmol/L    Glucose 143 (H) 70 - 99 mg/dL    Urea Nitrogen 31 (H) 7 - 30 mg/dL    Creatinine 1.78 (H) 0.66 - 1.25 mg/dL    GFR Estimate 39 (L) >60 mL/min/[1.73_m2]    GFR Estimate If Black 45 (L) >60 mL/min/[1.73_m2]    Calcium 8.5 8.5 - 10.1 mg/dL   Magnesium     Status: None   Result Value Ref Range    Magnesium 2.0 1.6 - 2.3 mg/dL   Glucose by meter     Status: Abnormal   Result Value Ref Range    Glucose 159 (H) 70 - 99 mg/dL   SARS-CoV-2 COVID-19 Virus (Coronavirus) RT-PCR Nasopharyngeal     Status: None    Specimen: Nasopharyngeal   Result Value Ref Range    SARS-CoV-2 Virus Specimen Source Nasopharyngeal     SARS-CoV-2 PCR Result NEGATIVE     SARS-CoV-2 PCR Comment       Testing was performed using the Aptima SARS-CoV-2 Assay on the [employer] Instrument System.   Additional information about this Emergency Use Authorization (EUA) assay can be found via   the Lab Guide.     CBC with platelets differential     Status: Abnormal   Result Value Ref Range    WBC 19.9 (H) 4.0 - 11.0 10e9/L    RBC Count 4.94 4.4 - 5.9 10e12/L    Hemoglobin 10.0 (L) 13.3 - 17.7 g/dL    Hematocrit 33.6 (L) 40.0 - 53.0 %    MCV 68 (L) 78 - 100 fl    MCH 20.2 (L) 26.5 - 33.0 pg    MCHC 29.8 (L) 31.5 - 36.5 g/dL    RDW 20.2 (H) 10.0 - 15.0 %    Platelet Count 418 150 - 450 10e9/L    Diff Method Automated Method     % Neutrophils 87.6 %    % Lymphocytes 4.0 %    % Monocytes 6.2 %    % Eosinophils  0.1 %    % Basophils 0.2 %    % Immature Granulocytes 1.9 %    Nucleated RBCs 0 0 /100    Absolute Neutrophil 17.4 (H) 1.6 - 8.3 10e9/L    Absolute Lymphocytes 0.8 0.8 - 5.3 10e9/L    Absolute Monocytes 1.2 0.0 - 1.3 10e9/L    Absolute Eosinophils 0.0 0.0 - 0.7 10e9/L    Absolute Basophils 0.0 0.0 - 0.2 10e9/L    Abs Immature Granulocytes 0.4 0 - 0.4 10e9/L    Absolute Nucleated RBC 0.0    Lactate Dehydrogenase     Status: None   Result Value Ref Range    Lactate Dehydrogenase 219 85 - 227 U/L   Glucose by meter     Status: Abnormal   Result Value Ref Range    Glucose 124 (H) 70 - 99 mg/dL   Lactic acid level STAT     Status: None   Result Value Ref Range    Lactate for Sepsis Protocol 1.8 0.7 - 2.0 mmol/L   Glucose by meter     Status: Abnormal   Result Value Ref Range    Glucose 161 (H) 70 - 99 mg/dL   Glucose by meter     Status: Abnormal   Result Value Ref Range    Glucose 122 (H) 70 - 99 mg/dL   Glucose by meter     Status: Abnormal   Result Value Ref Range    Glucose 154 (H) 70 - 99 mg/dL   INR     Status: Abnormal   Result Value Ref Range    INR 4.38 (H) 0.86 - 1.14   Glucose by meter     Status: Abnormal   Result Value Ref Range    Glucose 123 (H) 70 - 99 mg/dL   Basic metabolic panel     Status: Abnormal   Result Value Ref Range    Sodium 134 133 - 144 mmol/L    Potassium 3.8 3.4 - 5.3 mmol/L    Chloride 100 94 - 109 mmol/L    Carbon Dioxide 25 20 - 32 mmol/L    Anion Gap 8 3 - 14 mmol/L    Glucose 113 (H) 70 - 99 mg/dL    Urea Nitrogen 30 7 - 30 mg/dL    Creatinine 1.66 (H) 0.66 - 1.25 mg/dL    GFR Estimate 42 (L) >60 mL/min/[1.73_m2]    GFR Estimate If Black 49 (L) >60 mL/min/[1.73_m2]    Calcium 8.2 (L) 8.5 - 10.1 mg/dL   CBC with platelets     Status: Abnormal   Result Value Ref Range    WBC 15.7 (H) 4.0 - 11.0 10e9/L    RBC Count 4.88 4.4 - 5.9 10e12/L    Hemoglobin 9.7 (L) 13.3 - 17.7 g/dL    Hematocrit 32.7 (L) 40.0 - 53.0 %    MCV 67 (L) 78 - 100 fl    MCH 19.9 (L) 26.5 - 33.0 pg    MCHC 29.7 (L)  31.5 - 36.5 g/dL    RDW 19.9 (H) 10.0 - 15.0 %    Platelet Count 464 (H) 150 - 450 10e9/L   Glucose by meter     Status: Abnormal   Result Value Ref Range    Glucose 111 (H) 70 - 99 mg/dL   Glucose by meter     Status: Abnormal   Result Value Ref Range    Glucose 141 (H) 70 - 99 mg/dL   Iron and iron binding capacity     Status: Abnormal   Result Value Ref Range    Iron 16 (L) 35 - 180 ug/dL    Iron Binding Cap 261 240 - 430 ug/dL    Iron Saturation Index 6 (L) 15 - 46 %   Ferritin     Status: None   Result Value Ref Range    Ferritin 75 26 - 388 ng/mL   Glucose by meter     Status: Abnormal   Result Value Ref Range    Glucose 176 (H) 70 - 99 mg/dL   Glucose by meter     Status: Abnormal   Result Value Ref Range    Glucose 188 (H) 70 - 99 mg/dL   INR     Status: Abnormal   Result Value Ref Range    INR 3.40 (H) 0.86 - 1.14   CBC with platelets     Status: Abnormal   Result Value Ref Range    WBC 17.1 (H) 4.0 - 11.0 10e9/L    RBC Count 4.73 4.4 - 5.9 10e12/L    Hemoglobin 9.6 (L) 13.3 - 17.7 g/dL    Hematocrit 32.4 (L) 40.0 - 53.0 %    MCV 69 (L) 78 - 100 fl    MCH 20.3 (L) 26.5 - 33.0 pg    MCHC 29.6 (L) 31.5 - 36.5 g/dL    RDW 20.2 (H) 10.0 - 15.0 %    Platelet Count 472 (H) 150 - 450 10e9/L   Basic metabolic panel     Status: Abnormal   Result Value Ref Range    Sodium 135 133 - 144 mmol/L    Potassium 3.5 3.4 - 5.3 mmol/L    Chloride 101 94 - 109 mmol/L    Carbon Dioxide 27 20 - 32 mmol/L    Anion Gap 7 3 - 14 mmol/L    Glucose 141 (H) 70 - 99 mg/dL    Urea Nitrogen 41 (H) 7 - 30 mg/dL    Creatinine 1.77 (H) 0.66 - 1.25 mg/dL    GFR Estimate 39 (L) >60 mL/min/[1.73_m2]    GFR Estimate If Black 45 (L) >60 mL/min/[1.73_m2]    Calcium 7.9 (L) 8.5 - 10.1 mg/dL   Glucose by meter     Status: Abnormal   Result Value Ref Range    Glucose 165 (H) 70 - 99 mg/dL   Lactic acid level STAT     Status: None   Result Value Ref Range    Lactate for Sepsis Protocol 1.2 0.7 - 2.0 mmol/L   Glucose by meter     Status: Abnormal    Result Value Ref Range    Glucose 142 (H) 70 - 99 mg/dL   UA with Microscopic     Status: Abnormal   Result Value Ref Range    Color Urine Yellow     Appearance Urine Clear     Glucose Urine Negative NEG^Negative mg/dL    Bilirubin Urine Negative NEG^Negative    Ketones Urine Negative NEG^Negative mg/dL    Specific Gravity Urine 1.013 1.003 - 1.035    Blood Urine Negative NEG^Negative    pH Urine 5.5 5.0 - 7.0 pH    Protein Albumin Urine 30 (A) NEG^Negative mg/dL    Urobilinogen mg/dL Normal 0.0 - 2.0 mg/dL    Nitrite Urine Negative NEG^Negative    Leukocyte Esterase Urine Negative NEG^Negative    Source Clean catch urine     WBC Urine 0 0 - 5 /HPF    RBC Urine 0 0 - 2 /HPF    Squamous Epithelial /HPF Urine <1 0 - 1 /HPF    Mucous Urine Present (A) NEG^Negative /LPF    Hyaline Casts 1 0 - 2 /LPF   Glucose by meter     Status: Abnormal   Result Value Ref Range    Glucose 132 (H) 70 - 99 mg/dL   Infectious Disease General Adult IP Consult: Patient to be seen: Routine within 24 hrs; Call back #: Cards 2 [facility] phone [phone]; Driveline infection, followed by ID as outpatient, admitted as cultures grew GPC, please assist with abx recommendation...     Status: None ()    Negar Mason,      11/15/2020  3:03 PM  [facility]  Transplant Infectious Disease Consult Note - New Patient     Patient:  Eliseo Tanner, Date of birth 1955, Medical   record number [id number]  Date of Visit:  11/15/2020  Consult requested by Dr. Nader Cisneros for evaluation of MSSA   Bacteremia         Assessment and Recommendations:   Assessment:  63 y/o male with chronic systolic HF and NICM s/p HM 3 on   2/18/2020 c/b polymicrobial bacteremia (P mirabilis and E   faecalis), ABHINAV on CPAP, type II DM, CKD stage III, MSSA exit   drive line infection who has developed MSSA bacteremia.      1. Complicated MSSA bacteremia w/ associated LVAD drive line   infection: developed pain around exit site followed  by drainage~   11/1. Initially started on doxycycline 11/5 but switched to   tmp/smx when the culture revealed MSSA resistant to   doxy-subsequently switched to tmp/smx 1 S.S. BID. Blood cultures   were initially negative but then on recheck 11/12 Staph aureus   growth was observed. Susceptibilities pending but presume this   will be MSSA since that is what his wound culture grew.    Abdominal US count not visualize an abscess. Given the MSSA will   need to complete a full evaluation especially given his LVAD and   ICD. Repeat blood cultures are pending. Of note does have   bilateral TKA but currently there is no erythema or swelling over   these areas.      2. Diarrhea: overall improved.     Historical Infectious Diseases Issues:  2/2020 P mirabilis and E faecalis bacteremia post VAD placement    Recommendations:  1. Blood cultures at [facility] are growing Staph   aureus-susceptibilities pending (#[phone])  2. Repeat blood cultures are pending  3. Please obtain a CT abd/pelvis-contrast will provide better   visualization but understand that he has reduced creatinine   clearance so if needed w/o contrast is okay  4. Obtain a TTE-he does have a ICD in place so may need to   consider a CHAMP  5. Continue cefazolin 2 grams IV q 12 hours    Transplant Infectious Disease will continue to follow with you.    Negar Bhatti DO   Pager [phone]         History of Infectious Disease Illness:   HPI:  Mr. Tanner is a patient I saw in the outpatient clinic on 11/12.   HPI partially obtained from this note.       65 y/o male with chronic systolic HF and NICM s/p HM 3 on   2/18/2020 and ICD 3/2020, ABHINAV on CPAP, type II DM, CKD stage III,   MSSA LVAD exit site infection who has developed MSSA bacteremia.    Post LVAD placement he developed a retrosternal hematoma and   bleeding as well as Proteus mirabilis and E faecalis bacteremia   s/p 2 weeks of ampicillin and ceftriaxone. Also had an ICD placed   on 3/16/2020  for secondary SCD prevention.Also follows with   nephrology for renal disease and progressive creatinine increase.  On 11/4 patient called to report new redness, tenderness, bloody   drainage x 5 days at drive line site  On 11/5 he was started on doxycycline due to concerns for   infection. 11/15 culturegrew large amounts of MSSA (R: to   doxycycline). Blood culture negative.   On 11/6 he went to the ED for SOB.   On 11/8 doxycyline was changed to tmp/smx 1 S.S. BID x 14 days  After my clinic visit on 11/12 I obtained repeat blood cultures   which have down grown Staph aureus. Also found to have an   elevated INR.     Since our visit on 11/12 he had 1 day of diarrhea which has   improved. Denies fevers or chills. He thinks the drainage from   the LVAD site has improved. No erythema.  He has a ICD in place   and has not noticed increased erythema or swelling. Has chronic   left shoulder pain but no new pains in his joints or back.     Transplants:  Coordinator Kay Padilla    Review of Systems: Remaining systems all reviewed and negative.  CONSTITUTIONAL:  No fevers or chills  EYES: negative for icterus or acute vision changes  ENT:  negative for acute hearing loss, tinnitus, sore throat  RESPIRATORY:  negative for cough, sputum or dyspnea  CARDIOVASCULAR:  negative for chest pain, palpitations  GASTROINTESTINAL:  negative for nausea, vomiting, diarrhea or   constipation  GENITOURINARY:  negative for dysuria or hematuria  HEME:  No easy bruising or bleeding  INTEGUMENT:  negative for rash or pruritus  NEURO:  Negative for headache or tremor      Past Medical History:   Diagnosis Date     Chronic systolic congestive heart failure (H)      History of implantable cardioverter-defibrillator (ICD)   placement      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)      LVAD (left ventricular assist device) present (H)        Past Surgical History:   Procedure Laterality Date     ANESTHESIA  CARDIOVERSION N/A 2020    Procedure: ANESTHESIA, FOR CARDIOVERSION;  Surgeon: GENERIC   ANESTHESIA PROVIDER;  Location:  OR     CV CENTRAL VENOUS CATHETER PLACEMENT N/A 2020    Procedure: Central Venous Catheter Placement;  Surgeon:   Chente Moss MD;  Location:  HEART CARDIAC   CATH LAB     CV INTRA-AORTIC BALLOON PUMP INSERTION N/A 2020    Procedure: Intra-Aortic Balloon Pump Insertion;  Surgeon:   Jose Baldwin MD;  Location:  HEART CARDIAC CATH LAB     CV INTRA-AORTIC BALLOON PUMP INSERTION N/A 2020    Procedure: Intra-Aortic Balloon Pump Insertion;  Surgeon:   Chente Moss MD;  Location:  HEART CARDIAC   CATH LAB     CV RIGHT HEART CATH N/A 2020    Procedure: CV RIGHT HEART CATH;  Surgeon: Dalton Baeza MD;  Location:  HEART CARDIAC CATH LAB     CV SWAN LUCIANA PROCEDURE N/A 2020    Procedure: Hunt Luciana Procedure;  Surgeon: Chente Moss MD;  Location:  HEART CARDIAC CATH LAB     EP ICD Bilateral 3/16/2020    Procedure: EP ICD;  Surgeon: Dali Day MD;  Location:  HEART   CARDIAC CATH LAB     INSERT VENTRICULAR ASSIST DEVICE LEFT (HEARTMATE II) N/A   2020    Procedure: INSERTION, LEFT VENTRICULAR ASSIST DEVICE (HEARTMATE   III);  Surgeon: Mac Jaramillo MD;  Location:  OR     THORACOSCOPY Right 3/6/2020    Procedure: RIGHT VIDEO-ASSISTED THORASCOPIC SURGERY, EVACUATION   OF HEMOTHORAX, PLACEMENT OF CHEST TUBES;  Surgeon: William Gan MD;  Location:  OR       History reviewed. No pertinent family history.    Social History     Social History Narrative     Not on file     Social History     Tobacco Use     Smoking status: Former Smoker     Quit date: 1994     Years since quittin.6     Smokeless tobacco: Never Used   Substance Use Topics     Alcohol use: Not on file     Drug use: Not on file       Immunization History   Administered Date(s) Administered     Flu, Unspecified  11/15/2019     Influenza (High Dose) 3 valent vaccine 10/25/2019     Pneumo Conj 13-V (2010&after) 09/06/2018       Patient Active Problem List   Diagnosis     Acute on chronic systolic congestive heart failure (H)     Anxiety     CKD (chronic kidney disease) stage 3, GFR 30-59 ml/min     Coronary artery disease involving native coronary artery of   native heart without angina pectoris     GERD (gastroesophageal reflux disease)     Gout     Hyperlipidemia with target LDL less than 100     Nonischemic cardiomyopathy (H)     Non-nephrotic range proteinuria     ABHINAV on CPAP     Type 2 diabetes mellitus with stage 3 chronic kidney disease,   without long-term current use of insulin (H)     Heart failure (H)     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)     Cardiac arrest (H)     LVAD (left ventricular assist device) present (H)     Chronic systolic congestive heart failure (H)     CKD (chronic kidney disease) stage 4, GFR 15-29 ml/min (H)     Bacteremia            Current Medications & Allergies:   Antibiotic History:    Prophylaxis:      allopurinol  200 mg Oral or Feeding Tube Daily     amiodarone  200 mg Oral Daily     aspirin  81 mg Oral Daily     bumetanide  2 mg Oral BID     ceFAZolin  2 g Intravenous Q8H     FLUoxetine  20 mg Oral Daily     hydrALAZINE  100 mg Oral 4x Daily     insulin aspart  1-7 Units Subcutaneous TID AC     insulin aspart  1-5 Units Subcutaneous At Bedtime     insulin glargine  10 Units Subcutaneous At Bedtime     lisinopril  10 mg Oral Daily     magnesium oxide  400 mg Oral or Feeding Tube Daily     multivitamin w/minerals  1 tablet Oral Daily     polyethylene glycol  17 g Oral Daily     senna-docusate  1 tablet Oral BID    Or     senna-docusate  2 tablet Oral BID     sodium chloride (PF)  3 mL Intracatheter Q8H     warfarin-No DOSE today  1 each Does not apply no dose today   (warfarin)       Infusions/Drips:      - MEDICATION INSTRUCTIONS -       - MEDICATION INSTRUCTIONS -    "    Warfarin Therapy Reminder         Allergies   Allergen Reactions     Heparin      HIT screen positive 2/14/20, reflex DAVINA negative; however heme   recommended treating as if positive  HIT screen negative 2/11/20     Oxycodone Itching and Other (See Comments)     Chlorhexidine Rash            Physical Exam:     Patient Vitals for the past 24 hrs:   BP Temp Temp src Pulse Resp SpO2 Height Weight   11/15/20 1125 -- 97.4  F (36.3  C) Oral 111 18 95 % -- --   11/15/20 0825 101/87 98.4  F (36.9  C) Oral 112 18 95 % -- --   11/15/20 0300 124/84 99.6  F (37.6  C) Axillary 112 18 94 % -- --   11/14/20 2328 98/87 98.7  F (37.1  C) Oral 109 18 96 % -- --   11/14/20 2000 -- -- -- 112 -- -- 1.702 m (5' 7\") --   11/14/20 1948 (!) 115/90 97.9  F (36.6  C) Oral 110 18 97 % --   99.8 kg (220 lb)     Ranges for vital signs:  Temp:  [97.4  F (36.3  C)-99.6  F (37.6    C)] 97.4  F (36.3  C)  Pulse:  [109-112] 111  Resp:  [18] 18  BP: ()/(84-90) 101/87  SpO2:  [94 %-97 %] 95 %  Vitals:    11/14/20 1948   Weight: 99.8 kg (220 lb)       Physical Examination:  GENERAL:  well-appearing, in no acute distress. In bed  HEAD:  Head is normocephalic, atraumatic   EYES:  Eyes have anicteric sclerae without conjunctival   injection. Pupils reactive bilaterally.    ENT:  Oropharynx is moist without exudates or ulcers. Tongue is   midline  NECK:  Supple. No cervical lymphadenopathy  LUNGS:  Clear to auscultation bilaterally. No accessory muscle   use. Not on oxygen.   CARDIOVASCULAR:  HM noises. Minimal LE edema.    ABDOMEN:  Normal bowel sounds, soft, non-tender, non-distended.   No appreciable hepatosplenomegaly.  SKIN:  No acute rashes. LVAD dressing in place. ICD without   swelling or erythema.   EXTREMITIES: no joint swelling or erythema  NEUROLOGIC:  Grossly nonfocal. Active x4 extremities. Strength   equal.  LINES: PIV in place. No erythema around insertion site and   dressing intact.          Laboratory Data:     Metabolic Studies  "      Recent Labs   Lab Test 11/15/20  0541 11/14/20 2041 09/21/20  1440 09/21/20  1440 03/06/20  1615 03/06/20  1615 03/06/20  0322 03/06/20  0322 02/13/20  0206 02/13/20  0206 02/08/20  0408 02/08/20  0408    134   < >  --    < > 137  --  135   < > 132*   < > 134   POTASSIUM 4.4 4.9   < >  --    < > 4.1  --  4.1   < > 4.1  4.2     < > 3.5   CHLORIDE 102 102   < >  --    < >  --   --  104   < > 96   < >   103   CO2 21 25   < >  --    < >  --   --  30   < > 22   < > 23   ANIONGAP 10 7   < >  --    < >  --   --  1*   < > 14   < > 8   BUN 31* 35*   < >  --    < >  --   --  16   < > 34*   < > 50*   CR 1.78* 1.98*   < >  --    < >  --   --  1.02   < > 1.58*   < >   2.81*   GFRESTIMATED 39* 34*   < >  --    < >  --   --  76   < > 45*   <   > 22*   * 194*   < >  --    < > 123*  --  149*   < > 154*   < >   155*   A1C  --   --   --   --   --   --   --   --   --   --   --  7.3*   CALOS 8.5 8.8   < >  --    < >  --   --  8.0*   < > 8.9   < > 8.2*   PHOS  --   --   --   --   --   --   --  3.8   < > 5.4*   < > 5.0*     MAG 2.0  --   --   --    < >  --   --  2.2   < > 2.0   < >  --    LACT  --   --   --  1.3  --  0.7   < >  --    < > 9.5*   < > 0.6*     CKT  --   --   --   --   --   --   --   --   --  39  --   --     < > = values in this interval not displayed.       Hepatic Studies    Recent Labs   Lab Test 11/15/20  0830 11/14/20  2041 11/12/20 10/16/20   BILITOTAL  --  0.7 0.5 0.6   DBIL  --  0.4*  --  0.0   ALKPHOS  --  184* 202.0* 139   PROTTOTAL  --  7.8 7.6 8.4   ALBUMIN  --  2.6* 3.7 4.3   AST  --  48* 65.0* 70   ALT  --  56 64* 88     --   --  322       Hematology Studies      Recent Labs   Lab Test 11/15/20  0830 11/14/20  2041 11/05/20 09/21/20  1438 07/13/20 06/16/20   WBC 19.9* 18.4* 9.4 7.2 7.0 7.0   ANEU 17.4*  --   --  5.3  --   --    ALYM 0.8  --   --  1.1  --   --    GIULIANO 1.2  --   --  0.7  --   --    AEOS 0.0  --   --  0.1  --   --    HGB 10.0* 10.5* 10.1* 10.9* 10.9* 10.7*   HCT 33.6* 35.7*  33.7 36.8* 36 35.5    430 385 315 382 378       Arterial Blood Gas Testing    Recent Labs   Lab Test 03/15/20  2235 03/06/20  1615 03/06/20  1550 02/26/20  1321 02/24/20  0345 02/24/20  0345 02/23/20  1953   PH 7.53* 7.41 7.39  --   --  7.46* 7.47*   PCO2 35 42 45  --   --  42 37   PO2 126* 96 49*  --   --  123* 81   HCO3 29* 26 27  --   --  30* 27   O2PER 21 65 65 21.0   < > 2L  2L 21    < > = values in this interval not displayed.        Medication levels    Recent Labs   Lab Test 02/16/20  0400 02/13/20  2147 02/13/20  2147   VANCOMYCIN 18.5   < >  --    TOBRA  --   --  13.0    < > = values in this interval not displayed.     Inflammatory Markers    Recent Labs   Lab Test 11/05/20 09/21/20  1438   CRP 54.9 6.6     Pancreatitis testing    Recent Labs   Lab Test 02/08/20  0408   TRIG 57       Gout Labs      Recent Labs   Lab Test 02/08/20  0408   URIC 7.5*       Clotting Studies    Recent Labs   Lab Test 11/15/20  0541 11/14/20  2041 11/04/20 10/02/20 03/20/20  0530 03/20/20  0530   INR 6.13* 6.21* 2.4* 2.3*   < > 2.84*   PTT  --   --   --   --   --  59*    < > = values in this interval not displayed.       Iron Testing    Recent Labs   Lab Test 11/15/20  0830 02/08/20  0408 02/08/20  0408   IRON  --   --  25*   FEB  --   --  306   IRONSAT  --   --  8*   HEAVENLY  --   --  189   MCV 68*   < > 87   B12  --   --  752    < > = values in this interval not displayed.       Thyroid Studies     Recent Labs   Lab Test 02/08/20  0408   TSH 1.72       Urine Studies     Recent Labs   Lab Test 02/22/20  0944 02/13/20  0334 02/07/20 2029   URINEPH 5.5 6.0 5.0   NITRITE Negative Negative Negative   LEUKEST Small* Small* Negative   WBCU 2 81* 3     Last Culture results with specimen source  Culture Micro   Date Value Ref Range Status   11/14/2020 No growth after 9 hours  Preliminary   11/14/2020 No growth after 9 hours  Preliminary   09/21/2020 No growth  Final   09/21/2020 No growth  Final   03/13/2020 No growth  Final    03/13/2020 No growth  Final   03/03/2020 No growth  Final   03/03/2020 No growth  Final   02/22/2020 No growth  Final   02/22/2020 No growth  Final   02/16/2020 No growth  Final   02/16/2020 No growth  Final   02/15/2020 No growth  Final   02/15/2020 (A)  Final    Cultured on the 2nd day of incubation:  Staphylococcus epidermidis     02/15/2020   Final    Critical Value/Significant Value, preliminary result only,   called to and read back by  Cee Benavidez RN on 2.16.20 at 2220.  JRT     02/15/2020   Final    (Note)  POSITIVE for STAPHYLOCOCCUS EPIDERMIDIS and POSITIVE for the mecA  gene (resistant to methicillin) by Event[employer] multiplex nucleic   acid  test. Final identification and antimicrobial susceptibility   testing  will be verified by standard methods.    Specimen tested with MusclePh[employer]igene multiplex, gram-positive blood   culture  nucleic acid test for the following targets: Staph aureus, Staph  epidermidis, Staph lugdunensis, other Staph species, Enterococcus  faecalis, Enterococcus faecium, Streptococcus species, S.   agalactiae,  S. anginosus grp., S. pneumoniae, S. pyogenes, Listeria sp., mecA  (methicillin resistance) and Ernst/B (vancomycin resistance).    Critical Value/Significant Value called to and read back by   Cee Benavidez RN, 2.17.20 @ 0207 pt.     02/14/2020 No growth  Final   02/13/2020 (A)  Final    Cultured on the 1st day of incubation:  Proteus mirabilis  Susceptibility testing done on previous specimen     02/13/2020   Final    Critical Value/Significant Value, preliminary result only,   called to and read back by  Mima Cabrera RN. @1426. 2.13.20. BS.      02/13/2020 (A)  Final    Cultured on the 1st day of incubation:  Enterococcus faecalis  Susceptibility testing done on previous specimen     02/13/2020   Final    Critical Value/Significant Value, preliminary result only,   called to and read back by  Alisson Castillo RN on 2.13.20 at 1728.  JRT     02/13/2020   Final     (Note)  POSITIVE for ENTEROCOCCUS FAECALIS and NEGATIVE for Ernst/vanB   genes  by Verigene multiplex nucleic acid test. Final identification and  antimicrobial susceptibility testing will be verified by standard  methods.    Specimen tested with Verigene multiplex, gram-positive blood   culture  nucleic acid test for the following targets: Staph aureus, Staph  epidermidis, Staph lugdunensis, other Staph species, Enterococcus  faecalis, Enterococcus faecium, Streptococcus species, S.   agalactiae,  S. anginosus grp., S. pneumoniae, S. pyogenes, Listeria sp., mecA  (methicillin resistance) and Ernst/B (vancomycin resistance).    Critical Value/Significant Value called to and read back by MARIA EUGENIA MILLAN RN 2/13/20 2036           Specimen Description   Date Value Ref Range Status   11/14/2020 Blood Left Hand  Final   11/14/2020 Blood Left Arm  Final   09/21/2020 Blood Left Arm  Final   09/21/2020 Blood Right Arm  Final   03/13/2020 Blood Left Hand  Final   03/13/2020 Blood Right Hand  Final   03/03/2020 Blood Left Hand  Final   03/03/2020 Blood Right Arm  Final   02/22/2020 Blood Right Hand  Final   02/22/2020 Blood Right Hand  Final   02/19/2020 Nares  Final   02/16/2020 Blood Right Hand  Final   02/16/2020 Blood Left Hand  Final   02/15/2020 Blood Right Hand  Final   02/15/2020 Blood Left Hand  Final   02/14/2020 Blood Right Hand  Final   02/13/2020 Blood Left Hand  Final   02/13/2020 Blood Right Hand  Final   02/13/2020 Nasopharyngeal  Final        Last check of C difficile  No results found for: CDBPCT    Syphilis Testing    Treponema Antibodies   Date Value Ref Range Status   02/12/2020 Nonreactive NR^Nonreactive Final       Quantiferon testing   Recent Labs   Lab Test 11/15/20  0830 11/05/20 02/12/20  0440 02/12/20  0440 02/08/20  0408   TBRES  --   --   --  Negative Negative   LYMPH 4.0 13.5   < > 4.8 17.5    < > = values in this interval not displayed.       Virology:  Respiratory virus testing    Recent Labs    Lab Test 11/14/20  2140 02/13/20  0334   INFZA  --  Negative   INFZB  --  Negative   IRSV  --  Negative   [id number] Nasopharyngeal  --    SARSCOVRES NEGATIVE  --      Hepatitis B Testing     Recent Labs   Lab Test 02/12/20  0440 02/08/20  0408   AUSAB 0.69 1.99   HBCAB Nonreactive Nonreactive   HEPBANG Nonreactive Nonreactive        Hepatitis C Antibody   Date Value Ref Range Status   02/12/2020 Nonreactive NR^Nonreactive Final     Comment:     Assay performance characteristics have not been established for   newborns,   infants, and children     02/08/2020 Nonreactive NR^Nonreactive Final     Comment:     Assay performance characteristics have not been established for   newborns,   infants, and children         CMV Antibody IgG   Date Value Ref Range Status   02/12/2020 >8.0 (H) 0.0 - 0.8 AI Final     Comment:     Positive  Antibody index (AI) values reflect qualitative changes in   antibody   concentration that cannot be directly associated with clinical   condition or   disease state.     02/08/2020 7.8 (H) 0.0 - 0.8 AI Final     Comment:     Positive  Antibody index (AI) values reflect qualitative changes in   antibody   concentration that cannot be directly associated with clinical   condition or   disease state.       Varicella Zoster Virus Antibody IgG   Date Value Ref Range Status   02/12/2020 2.0 (H) 0.0 - 0.8 AI Final     Comment:     Positive, suggests prev. exposure and probable immunity  Antibody index (AI) values reflect qualitative changes in   antibody   concentration that cannot be directly associated with clinical   condition or   disease state.     02/08/2020 2.4 (H) 0.0 - 0.8 AI Final     Comment:     Positive, suggests prev. exposure and probable immunity  Antibody index (AI) values reflect qualitative changes in   antibody   concentration that cannot be directly associated with clinical   condition or   disease state.       Toxoplasma Antibody IgG   Date Value Ref Range Status   02/12/2020  <3.0 0.0 - 7.1 IU/mL Final     Comment:     Negative- Absence of detectable Toxoplasma gondii IgG   antibodies. A negative   result does not rule out acute infection.  The magnitude of the measured result is not indicative of the   amount of   antibody present. The concentrations of anti-Toxoplasma gondii   IgG in a given   specimen determined with assays from different manufacturers can   vary due to   differences in assay methods and reagent specificity.     02/08/2020 <3.0 0.0 - 7.1 IU/mL Final     Comment:     Negative- Absence of detectable Toxoplasma gondii IgG   antibodies. A negative   result does not rule out acute infection.  The magnitude of the measured result is not indicative of the   amount of   antibody present. The concentrations of anti-Toxoplasma gondii   IgG in a given   specimen determined with assays from different manufacturers can   vary due to   differences in assay methods and reagent specificity.       No results found for: H1IGG, H2IGG, EBVCAM    Imaging:  Recent Results (from the past 48 hour(s))   US Abdomen Complete    Narrative    EXAMINATION: US ABDOMEN COMPLETE, 11/15/2020 9:20 AM     COMPARISON: 2/8/2020    HISTORY: Concern for abscess, patient unable to get CT scan due   to  elevated creatinine    TECHNIQUE: The abdomen was scanned in standard fashion with  specialized ultrasound transducer(s) using both gray-scale and   limited  color Doppler techniques.    FINDINGS:    Liver: The liver demonstrates normal homogeneous echotexture. The  liver measures 19.7 cm in craniocaudal dimension. No evidence of   a  focal hepatic mass. The main portal vein is patent with antegrade  flow. Gallbladder: There is no wall thickening, positive   sonographic  Yoon's sign or evidence for cholelithiasis.    Bile Ducts: Both the intra- and extrahepatic biliary system are   of  normal caliber. The common bile duct measures 3.7 mm in diameter.    Pancreas: Not well visualized.    Kidneys: Both kidneys  are of normal echotexture, without mass or  hydronephrosis. The craniocaudal dimensions are: right- 10.5,   left-  11.5.    Spleen: The spleen is normal in size, measuring 11.1 in sagittal  dimension.    Aorta and IVC: The visualized portions of the aorta and IVC are  unremarkable. The proximal aorta measures 2.7 cm in diameter and   the  IVC measures 2.4 cm in diameter.    Fluid: Small volume of ascites. Partially imaged right pleural  effusion.        Impression    IMPRESSION:   1. Hepatomegaly with small volume ascites. No intra-abdominal   abscess  visualized. If there is continued clinical concern for abscess,  consider noncontrast CT scan of the abdomen/pelvis.  2. Partially imaged right pleural effusion, likely trace.    I have personally reviewed the examination and initial   interpretation  and I agree with the findings.    DEION FLANAGAN MD        Echo Complete     Status: None    Narrative    [id number]  [id number]  [id number]  [id number]^PHYLLIS NAIR^CNONIE^YODIT           [facility],[place]  Echocardiography Laboratory  [address] [place] [address]     Name: WOLFGANG LEACH  MRN: [id number]  : 1955  Study Date: 2020 10:56 AM  Age: 65 yrs  Gender: Male  Patient Location: [facility] [place]  Reason For Study: Endocarditis  Ordering Physician: CONNIE JIN  Performed By: Nelly Monte RDCS     BSA: 2.1 m2  Height: 67 in  Weight: 220 lb  HR: 118  BP: 97/67 mmHg  _____________________________________________________________________________  __        Procedure  LVAD Echocardiogram with two-dimensional, color and spectral Doppler  performed. Technically difficult study.Extremely poor acoustic windows.  Limited information was obtained during study.  _____________________________________________________________________________  __        Interpretation Summary  Technically difficult study.Extremely poor acoustic windows.  Limited information was  obtained during study.  LVAD cannula was seen in the expected anatomic position in the LV apex.  HM3 at 5500RPM.  LVIDd 48mm.  Septum probably normal.  Aortic valve cannot be assessed.  Flow velocities not obtained.  Dilation of the inferior vena cava is present with abnormal respiratory  variation in diameter.  No pericardial effusion is present.  _____________________________________________________________________________  __        Left Ventricle  The Ejection Fraction is estimated at <20%. LVAD cannula was seen in the  expected anatomic position in the LV apex. HM3 at 5500RPM.  LVIDd 48mm.  Septum probably normal.  Aortic valve cannot be assessed.  Flow velocities not obtained.     Right Ventricle  Right ventricular function cannot be assessed due to poor image quality.     Vessels  Dilation of the inferior vena cava is present with abnormal respiratory  variation in diameter.     Pericardium  No pericardial effusion is present.     _____________________________________________________________________________  __  MMode/2D Measurements & Calculations  IVSd: 0.92 cm  LVIDd: 4.8 cm  LVIDs: 4.6 cm  LVPWd: 0.88 cm     FS: 4.6 %  LV mass(C)d: 147.6 grams  LV mass(C)dI: 70.1 grams/m2  asc Aorta Diam: 3.5 cm  RWT: 0.37           _____________________________________________________________________________  __           Report approved by: Graciela Tomlinson 11/16/2020 11:39 AM      Blood culture     Status: Abnormal    Specimen: Blood    Left Hand   Result Value Ref Range    Specimen Description Blood Left Hand     Culture Micro (A)      Cultured on the 1st day of incubation:  Staphylococcus aureus      Culture Micro       Critical Value/Significant Value, preliminary result only, called to and read back by  ROSA ACE, KRISTEN 1553 11.15.20 NDP      Culture Micro       (Note)  POSITIVE for STAPHYLOCOCCUS AUREUS and NEGATIVE for the mecA gene  (not MRSA) by [employer] multiplex nucleic acid test. The mecA gene was  not  detected. Final identification and antimicrobial susceptibility  testing will be verified by standard methods.    Specimen tested with Verigene multiplex, gram-positive blood culture  nucleic acid test for the following targets: Staph aureus, Staph  epidermidis, Staph lugdunensis, other Staph species, Enterococcus  faecalis, Enterococcus faecium, Streptococcus species, S. agalactiae,  S. anginosus grp., S. pneumoniae, S. pyogenes, Listeria sp., mecA  (methicillin resistance) and Ernst/B (vancomycin resistance).    Critical Value/Significant Value called to and read back by Sha Fontenot, Sarita 1835 11.15.20 NDP         Susceptibility    Staphylococcus aureus - DONTA     CLINDAMYCIN <=0.25 Sensitive ug/mL     ERYTHROMYCIN <=0.25 Sensitive ug/mL     GENTAMICIN <=0.5 Sensitive ug/mL     OXACILLIN 0.5 Sensitive ug/mL     TETRACYCLINE >=16 Resistant ug/mL     Trimethoprim/Sulfa <=0.5/9.5 Sensitive ug/mL     VANCOMYCIN 1 Sensitive ug/mL   Blood culture     Status: Abnormal    Specimen: Blood    Left Arm   Result Value Ref Range    Specimen Description Blood Left Arm     Culture Micro (A)      Cultured on the 1st day of incubation:  Staphylococcus aureus  Susceptibility testing done on previous specimen      Culture Micro       Critical Value/Significant Value, preliminary result only, called to and read back by  PATRICIO SHANNON RN 2101 11.15.20 NDP     Gram stain     Status: None    Specimen: Wound    Drainage   Result Value Ref Range    Specimen Description Wound Drainage     Special Requests Specimen collected in eSwab transport (white cap)     Gram Stain No organisms seen     Gram Stain Rare  WBC'S seen      Clostridium difficile toxin B PCR     Status: None    Specimen: Feces   Result Value Ref Range    Specimen Description Feces     C Diff Toxin B PCR Negative NEG^Negative   Blood culture     Status: None (Preliminary result)    Specimen: Blood    Left Arm   Result Value Ref Range    Specimen Description Blood Left Arm      Culture Micro No growth after 2 days    Blood culture     Status: None (Preliminary result)    Specimen: Blood    Right Hand   Result Value Ref Range    Specimen Description Blood Right Hand     Culture Micro No growth after 2 days    Wound Culture Aerobic Bacterial     Status: None (Preliminary result)    Specimen: Fluid, Drainage; Wound    Drainage   Result Value Ref Range    Specimen Description Wound Drainage     Special Requests Specimen collected in eSwab transport (white cap)     Culture Micro Culture in progress    Blood culture     Status: None (Preliminary result)    Specimen: Blood    Left Hand   Result Value Ref Range    Specimen Description Blood Left Hand     Culture Micro No growth after 17 hours    Blood culture     Status: None (Preliminary result)    Specimen: Blood    Right Arm   Result Value Ref Range    Specimen Description Blood Right Arm     Culture Micro No growth after 17 hours    Blood culture     Status: None (Preliminary result)    Specimen: Blood    Right Arm   Result Value Ref Range    Specimen Description Blood Right Arm     Culture Micro No growth after 8 hours    Blood culture     Status: None (Preliminary result)    Specimen: Blood    Left Arm   Result Value Ref Range    Specimen Description Blood Left Arm     Culture Micro No growth after 8 hours

## 2020-11-17 NOTE — PROGRESS NOTES
[facility]  Transplant Infectious Disease Progress Note     Patient:  Eliseo Tanner, Date of birth 1955, Medical record number [id number]  Date of Visit:  11/17/2020         Assessment and Recommendations:   Assessment:  63 y/o male with chronic systolic HF and NICM s/p HM 3 on 2/18/2020 c/b polymicrobial bacteremia (P mirabilis and E faecalis), ICD placement, ABHINAV on CPAP, type II DM, CKD stage III who presented with complicated MSSA bacteremia 2/2 drive line infection.      1. Complicated MSSA bacteremia w/ associated MSSA LVAD drive line infection: developed pain around exit site followed by drainage~ 11/1. Initially started on doxycycline 11/5 but switched to tmp/smx when the culture revealed MSSA resistant to doxy-subsequently switched to tmp/smx 1 S.S. BID. Blood cultures were initially negative but 11/12 and 11/14 were positive for MSSA.  CT A/P was negative for a deeper drive line infection. TTE was poor quality and the valves were not well visualized. I am concerned that he could have been bacteremic for a prolonged period prior to presentation potentially seeding ICD leads or somewhere else.  Spoke with cardiology re a CHAMP and my concern re potential lead infection in the setting of likely prolonged bacteremia. This would  because the ICD would need to be removed but upon further discussion with cardiology the concern is that the ICD and LVAD may need to be removed if there were vegetations seen which would be complicated. For now cardiology would prefer not to obtain a CHAMP. He continuous to have an elevated WBC so this will need to be monitored. At this point he denies joint or back pain which would be concerning for consequential septic arthritis or epidural abscess in the setting of MSSA bacteremia. Of note he does have bilateral TKA but currently there is no erythema or swelling over these areas. UA (negative) was done today after an episode of  dysuria and hypotension.        2. Diarrhea: overall improved. C diff and enteric pathogen panel negative.      Historical Infectious Diseases Issues:  2/2020 P mirabilis and E faecalis bacteremia post VAD placement     Recommendations:  1. Continue cefazolin 2 grams IV q 8 hours-dose adjusted due to improved creatinine clearance  2. Can place PICC line once blood cultures have been clear for 48-72 hours   3. Per cardiology preference will defer CHAMP for now  4. Will need to trend WBC count-it is concerning that it remains elevated despite appropriate antibiotics (? another source or MSSA seeding somewhere). Patient should be optimized before discharge because he lives 4 hours away  5. Options for home infusion include 2 grams IV q 8 hours or 6 grams IV as a continuous infusion over 24 hours. Wife is an EMT so she can help.   6. Anticipated duration of 6 weeks from 11/15-12/27/20. Will need suppression after.     Transplant Infectious Disease will continue to follow with you.      Negar Bhatti DO. Pager [phone]        Interval History:   Since was last seen by ID, 11/16. Chart reviewed to date.    WBC increased today. He developed increased suprapubic pain overnight but UA was negative. Denies joint or back pain. Blood cultures have remained negative.       Transplants: Coordinator Kay Padilla    Review of Systems:Remaining systems all reviewed and negative.    Antibiotic History  Cefazolin 11/14-C         Current Medications & Allergies:       allopurinol  200 mg Oral or Feeding Tube Daily     amiodarone  200 mg Oral Daily     aspirin  81 mg Oral Daily     bumetanide  2 mg Oral BID     ceFAZolin  2 g Intravenous Q8H     finasteride  5 mg Oral Daily     FLUoxetine  20 mg Oral Daily     hydrALAZINE  100 mg Oral TID     insulin aspart  1-7 Units Subcutaneous TID AC     insulin aspart  1-5 Units Subcutaneous At Bedtime     insulin glargine  10 Units Subcutaneous At Bedtime     lisinopril  10 mg Oral Daily      magnesium oxide  400 mg Oral or Feeding Tube Daily     multivitamin w/minerals  1 tablet Oral Daily     polyethylene glycol  17 g Oral Daily     senna-docusate  1 tablet Oral BID    Or     senna-docusate  2 tablet Oral BID     sodium chloride (PF)  3 mL Intracatheter Q8H     tamsulosin  0.4 mg Oral Daily     warfarin-No DOSE today  1 each Does not apply no dose today (warfarin)       Infusions/Drips:    - MEDICATION INSTRUCTIONS -       - MEDICATION INSTRUCTIONS -       Warfarin Therapy Reminder         Allergies   Allergen Reactions     Heparin      HIT screen positive 2/14/20, reflex DAVINA negative; however heme recommended treating as if positive  HIT screen negative 2/11/20     Oxycodone Itching and Other (See Comments)     Chlorhexidine Rash            Physical Exam:   Vitals were reviewed.  All vitals stable.  Patient Vitals for the past 24 hrs:   BP Temp Temp src Pulse Resp SpO2 Weight   11/17/20 1536 109/72 97.8  F (36.6  C) Oral 119 18 95 % --   11/17/20 1110 121/79 98.7  F (37.1  C) Oral 116 18 96 % --   11/17/20 0828 109/82 98  F (36.7  C) Oral 120 18 97 % --   11/17/20 0749 (!) 82/66 98.4  F (36.9  C) Oral 115 18 97 % --   11/17/20 0727 (!) 77/60 98.1  F (36.7  C) Oral 120 18 96 % --   11/17/20 0400 114/63 98.6  F (37  C) Axillary 97 16 96 % 97.3 kg (214 lb 9.6 oz)   11/16/20 2349 102/56 99.1  F (37.3  C) Axillary 118 16 98 % --   11/16/20 2045 117/88 -- -- -- -- -- --   11/16/20 1950 (!) 100/94 99.9  F (37.7  C) Oral 116 16 96 % --     Ranges for vital signs:  Temp:  [97.8  F (36.6  C)-99.9  F (37.7  C)] 97.8  F (36.6  C)  Pulse:  [] 119  Resp:  [16-18] 18  BP: ()/(56-94) 109/72  SpO2:  [95 %-98 %] 95 %  Vitals:    11/14/20 1948 11/16/20 0657 11/17/20 0400   Weight: 99.8 kg (220 lb) 98 kg (216 lb) 97.3 kg (214 lb 9.6 oz)       Physical Examination:  GENERAL:  well-appearing. in bed; in no acute distress.  HEAD:  Head is normocephalic, atraumatic   EYES:  Eyes have anicteric sclerae without  conjunctival injection.   CV: LVAD in place. Dressing intact.  ICD not erythematous or swollen.   LUNGS: Lungs are clear bilaterally. No accessory muscle use. Not on oxygen.   EXTREMITIES: No joint erythema or swelling.  No back pain in palpation.   NEUROLOGIC:  Grossly nonfocal. Active x4 extremities  LINES: peripheral IV in place without any surrounding erythema or exudate. Dressing intact.          Laboratory Data:     Metabolic Studies       Recent Labs   Lab Test 11/17/20  0549 11/16/20  0616 11/15/20  0541 09/21/20  1440 09/21/20  1440 03/06/20  1615 03/06/20  1615 03/06/20  0322 03/06/20  0322 02/13/20  0206 02/13/20  0206 02/08/20  0408 02/08/20  0408    134 133   < >  --    < > 137  --  135   < > 132*   < > 134   POTASSIUM 3.5 3.8 4.4   < >  --    < > 4.1  --  4.1   < > 4.1  4.2   < > 3.5   CHLORIDE 101 100 102   < >  --    < >  --   --  104   < > 96   < > 103   CO2 27 25 21   < >  --    < >  --   --  30   < > 22   < > 23   ANIONGAP 7 8 10   < >  --    < >  --   --  1*   < > 14   < > 8   BUN 41* 30 31*   < >  --    < >  --   --  16   < > 34*   < > 50*   CR 1.77* 1.66* 1.78*   < >  --    < >  --   --  1.02   < > 1.58*   < > 2.81*   GFRESTIMATED 39* 42* 39*   < >  --    < >  --   --  76   < > 45*   < > 22*   * 113* 143*   < >  --    < > 123*  --  149*   < > 154*   < > 155*   A1C  --   --   --   --   --   --   --   --   --   --   --   --  7.3*   CALOS 7.9* 8.2* 8.5   < >  --    < >  --   --  8.0*   < > 8.9   < > 8.2*   PHOS  --   --   --   --   --   --   --   --  3.8   < > 5.4*   < > 5.0*   MAG  --   --  2.0  --   --    < >  --   --  2.2   < > 2.0   < >  --    LACT  --   --   --   --  1.3  --  0.7   < >  --    < > 9.5*   < > 0.6*   CKT  --   --   --   --   --   --   --   --   --   --  39  --   --     < > = values in this interval not displayed.       Hepatic Studies    Recent Labs   Lab Test 11/15/20  0830 11/14/20  2041 11/12/20 10/16/20   BILITOTAL  --  0.7 0.5 0.6   DBIL  --  0.4*  --  0.0    ALKPHOS  --  184* 202.0* 139   PROTTOTAL  --  7.8 7.6 8.4   ALBUMIN  --  2.6* 3.7 4.3   AST  --  48* 65.0* 70   ALT  --  56 64* 88     --   --  322       Hematology Studies      Recent Labs   Lab Test 11/17/20  0549 11/16/20  0616 11/15/20  0830 11/14/20  2041 11/05/20 09/21/20  1438   WBC 17.1* 15.7* 19.9* 18.4* 9.4 7.2   ANEU  --   --  17.4*  --   --  5.3   ALYM  --   --  0.8  --   --  1.1   GIULIANO  --   --  1.2  --   --  0.7   AEOS  --   --  0.0  --   --  0.1   HGB 9.6* 9.7* 10.0* 10.5* 10.1* 10.9*   HCT 32.4* 32.7* 33.6* 35.7* 33.7 36.8*   * 464* 418 430 385 315     Arterial Blood Gas Testing    Recent Labs   Lab Test 03/15/20  2235 03/06/20  1615 03/06/20  1550 02/26/20  1321 02/24/20  0345 02/24/20  0345 02/23/20  1953   PH 7.53* 7.41 7.39  --   --  7.46* 7.47*   PCO2 35 42 45  --   --  42 37   PO2 126* 96 49*  --   --  123* 81   HCO3 29* 26 27  --   --  30* 27   O2PER 21 65 65 21.0   < > 2L  2L 21    < > = values in this interval not displayed.        Medication levels    Recent Labs   Lab Test 02/16/20  0400 02/13/20  2147 02/13/20  2147   VANCOMYCIN 18.5   < >  --    TOBRA  --   --  13.0    < > = values in this interval not displayed.       Inflammatory Markers    Recent Labs   Lab Test 11/05/20 09/21/20  1438 03/04/20  0757 02/08/20  0408   CRP 54.9 6.6 77.0* 21.0*     Pancreatitis testing    Recent Labs   Lab Test 02/08/20  0408   TRIG 57       Gout Labs      Recent Labs   Lab Test 02/08/20  0408   URIC 7.5*       Clotting Studies    Recent Labs   Lab Test 11/17/20  0549 11/16/20  0616 11/15/20  0541 11/14/20 2041 03/20/20  0530 03/20/20  0530   INR 3.40* 4.38* 6.13* 6.21*   < > 2.84*   PTT  --   --   --   --   --  59*    < > = values in this interval not displayed.       Iron Testing    Recent Labs   Lab Test 11/17/20  0549 11/16/20  0616 02/08/20 0408 02/08/20 0408   IRON  --  16*  --  25*   FEB  --  261  --  306   IRONSAT  --  6*  --  8*   HEAVENLY  --  75  --  189   MCV 69* 67*   < > 87    B12  --   --   --  752    < > = values in this interval not displayed.       Thyroid Studies     Recent Labs   Lab Test 02/08/20  0408   TSH 1.72       Body fluid stats    Recent Labs   Lab Test 11/16/20  1140   GS No organisms seen  Rare  WBC'S seen         Microbiology:  Last Culture results with specimen source  Culture Micro   Date Value Ref Range Status   11/17/2020 No growth after 8 hours  Preliminary   11/17/2020 No growth after 8 hours  Preliminary   11/16/2020 No growth after 17 hours  Preliminary   11/16/2020 No growth after 17 hours  Preliminary   11/16/2020 Culture in progress  Preliminary   11/15/2020 No growth after 2 days  Preliminary   11/15/2020 No growth after 2 days  Preliminary   11/14/2020 (A)  Final    Cultured on the 1st day of incubation:  Staphylococcus aureus     11/14/2020   Final    Critical Value/Significant Value, preliminary result only, called to and read back by  SHA ACE RN 2375 11.15.20 NDP     11/14/2020   Final    (Note)  POSITIVE for STAPHYLOCOCCUS AUREUS and NEGATIVE for the mecA gene  (not MRSA) by [employer] multiplex nucleic acid test. The mecA gene was  not detected. Final identification and antimicrobial susceptibility  testing will be verified by standard methods.    Specimen tested with Verigene multiplex, gram-positive blood culture  nucleic acid test for the following targets: Staph aureus, Staph  epidermidis, Staph lugdunensis, other Staph species, Enterococcus  faecalis, Enterococcus faecium, Streptococcus species, S. agalactiae,  S. anginosus grp., S. pneumoniae, S. pyogenes, Listeria sp., mecA  (methicillin resistance) and Ernst/B (vancomycin resistance).    Critical Value/Significant Value called to and read back by Sha Ace Rn 1433 11.15.20 NDP     11/14/2020 (A)  Final    Cultured on the 1st day of incubation:  Staphylococcus aureus  Susceptibility testing done on previous specimen     11/14/2020   Final    Critical Value/Significant Value,  preliminary result only, called to and read back by  PATRICIO SHANNON, RN 2101 11.15.20 NDP     09/21/2020 No growth  Final   09/21/2020 No growth  Final   03/13/2020 No growth  Final   03/13/2020 No growth  Final    Specimen Description   Date Value Ref Range Status   11/17/2020 Blood Right Arm  Final   11/17/2020 Blood Left Arm  Final   11/16/2020 Blood Left Hand  Final   11/16/2020 Blood Right Arm  Final   11/16/2020 Wound Drainage  Final   11/16/2020 Wound Drainage  Final   11/15/2020 Blood Right Hand  Final   11/15/2020 Blood Left Arm  Final   11/15/2020 Feces  Final   11/14/2020 Blood Left Hand  Final   11/14/2020 Blood Left Arm  Final   09/21/2020 Blood Left Arm  Final   09/21/2020 Blood Right Arm  Final   03/13/2020 Blood Left Hand  Final   03/13/2020 Blood Right Hand  Final   03/03/2020 Blood Left Hand  Final        Last check of C difficile  C Diff Toxin B PCR   Date Value Ref Range Status   11/15/2020 Negative NEG^Negative Final     Comment:     Negative: C. difficile target DNA sequences NOT detected, presumed negative   for C.difficile toxin B or the number of bacteria present may be below the   limit of detection for the test.  FDA approved assay performed using [employer] GeneXpert real-time PCR.  A negative result does not exclude actual disease due to C. difficile and may   be due to improper collection, handling and storage of the specimen or the   number of organisms in the specimen is below the detection limit of the assay.       Virology:  Respiratory virus testing    Recent Labs   Lab Test 11/14/20 2140 02/13/20 0334   INFZA  --  Negative   INFZB  --  Negative   IRSV  --  Negative   [id number] Nasopharyngeal  --    SARSCOVRES NEGATIVE  --      Urine Studies     Recent Labs   Lab Test 11/17/20  0825 02/22/20  0944 02/13/20  0334 02/07/20 2029   URINEPH 5.5 5.5 6.0 5.0   NITRITE Negative Negative Negative Negative   LEUKEST Negative Small* Small* Negative   WBCU 0 2 81* 3     Imaging:  Recent  Results (from the past 48 hour(s))   Echo Complete    Narrative    [id number]  [id number]  [id number]  [id number]^PHYLLIS NAIR^CONNIE^YODIT           [facility],[place]  Echocardiography Laboratory  [address] [place] [id number]     Name: WOLFGANG LEACH  MRN: [id number]  : 1955  Study Date: 2020 10:56 AM  Age: 65 yrs  Gender: Male  Patient Location: [facility] [place]  Reason For Study: Endocarditis  Ordering Physician: CONNIE JIN  Performed By: Nelly Monte RDCS     BSA: 2.1 m2  Height: 67 in  Weight: 220 lb  HR: 118  BP: 97/67 mmHg  _____________________________________________________________________________  __        Procedure  LVAD Echocardiogram with two-dimensional, color and spectral Doppler  performed. Technically difficult study.Extremely poor acoustic windows.  Limited information was obtained during study.  _____________________________________________________________________________  __        Interpretation Summary  Technically difficult study.Extremely poor acoustic windows.  Limited information was obtained during study.  LVAD cannula was seen in the expected anatomic position in the LV apex.  HM3 at 5500RPM.  LVIDd 48mm.  Septum probably normal.  Aortic valve cannot be assessed.  Flow velocities not obtained.  Dilation of the inferior vena cava is present with abnormal respiratory  variation in diameter.  No pericardial effusion is present.  _____________________________________________________________________________  __        Left Ventricle  The Ejection Fraction is estimated at <20%. LVAD cannula was seen in the  expected anatomic position in the LV apex. HM3 at 5500RPM.  LVIDd 48mm.  Septum probably normal.  Aortic valve cannot be assessed.  Flow velocities not obtained.     Right Ventricle  Right ventricular function cannot be assessed due to poor image quality.     Vessels  Dilation of the inferior vena cava is present with  abnormal respiratory  variation in diameter.     Pericardium  No pericardial effusion is present.     _____________________________________________________________________________  __  MMode/2D Measurements & Calculations  IVSd: 0.92 cm  LVIDd: 4.8 cm  LVIDs: 4.6 cm  LVPWd: 0.88 cm     FS: 4.6 %  LV mass(C)d: 147.6 grams  LV mass(C)dI: 70.1 grams/m2  asc Aorta Diam: 3.5 cm  RWT: 0.37           _____________________________________________________________________________  __           Report approved by: Graciela Tomlinson 11/16/2020 11:39 AM      US Renal Complete    Narrative    EXAMINATION: US RENAL COMPLETE, 11/17/2020 1:43 PM     COMPARISON: None.    HISTORY: Rule out obstruction    TECHNIQUE: The kidneys and bladder were scanned in the standard  fashion with specialized ultrasound transducer(s) using both gray  scale and limited color/spectral Doppler techniques.    FINDINGS:    Right kidney: Measures 10.5 cm in length. Parenchyma is of normal  thickness and echogenicity. No focal mass. No hydronephrosis.    Left kidney: Measures 11.6 cm in length. Parenchyma is of normal  thickness and echogenicity. No focal mass. No hydronephrosis.     Bladder: Decompressed with Guevara catheter.      Impression    IMPRESSION:  1.  Normal renal ultrasound study.    OSITO BRAVO MD

## 2020-11-17 NOTE — CONSULTS
Post LVAD Patient Social Work Assessment     Patient Name: Eliseo Tanner  : 1955  Age: 65 year old  MRN: [id number]  Date of LVAD: 2020    Patient known to me from follow up in the LVAD program.  Admitted on 2020 for drive line infection.  Seen today to update assessment.       Presenting Information   Living Situation: Pt lives with his wife, in [place], [place].   Functional Status: Pt has been independent w/ ADLs, ambulation and driving   Cultural/Language/Spiritual Considerations: None     Support System  Primary Support Person Pt's wife, Veronique   Other support:  3 children: daughter ([place]), 2 sons ([place] & [place]) - very supportive and available for support  6 grandchildren  Plan for support in immediate post-hospital period: Anticipate discharge back home with home IV abx.     Health Care Directive  Decision Maker: Pt   Alternate Decision Maker: Per FOUZIA, pt's wife would be next appropriate decision maker   Health Care Directive: Provided Copy    Mental Health/Coping:   History of Mental Health: None   History of Chemical Health: None   Current status: Pt's wife reports she has had concerns with pt's mood as he has more depressed in recent weeks. Pt denies concerns. Pt is currently on Prozac that is managed by PCP.   Coping: Pt has not been happy with quality of life since LVAD, but with more conversation, it's actually been in the last 2-3 months that he has been unsatisfied with his quality of life. Pt now thinks he has been feeling generally unwell for a few months and that may have been attributing to his poor quality of life. Pt is hopeful that the abx will make him feel better.   Services Needed/Recommended: None currently.     Financial   Income: Social Security Disability, wife's wages/income   Impact of LVAD on income: Minimal   Insurance and medication coverage: Yes, but not for home IV abx   Financial concerns: Have discussed private pay costs for home IV abx with pt and his  wife and will think about it.   Resources needed: None currently.     Discharge Plan   Patient and family discharge goal: Return home. We have discussed TCU for IV abx and pt and wife are declining due to concerns around Covid and pt not able to have visitors.   Barriers to discharge: IV abx plan    Education provided by VAINTA: Social Work role inpatient setting, availability of Virtual LVAD support groups, parking information and discharge planning    Assessment and recommendations and plan:      Pt and wife are eager to work towards a discharge home w/ IV abx. RNCC and writer are working with pt and wife and outpatient services to confirm outpatient IV abx plan. We are looking at private pay home infusion vs pt going to the hospital to receive the abx. Pt's wife will be able to provide transportation home, but will need notice as she is coming from 3.5hrs away.

## 2020-11-17 NOTE — PLAN OF CARE
D: Pt admit 11/14/20 for driveline infection. PMH NICM LVEF 15-20% s/p HM3 LVAD 2/18/20 c/b MSSA driveline infection (11/5/20), moderate nonobstructive CAD, severe MR,  ABHINAV on CPAP, DM2, CKD III, HLD, ANA, prior tobacco use.     I/A:   Neuro: A&Ox4  VS: VSS. RA. Home CPAP overnight. Doppler MAP  LVAD #'s WNL, no alarms this shift. Dressing CDI.   Tele: Paced   Pain: Denies  GI/: Urinating adequately. No BM this shift.  Diet: Regular  BG/insulin: BG check ACHS. Insulin administered per protocol.   IV/Drips: L PIV SL  Activity: Independent  Skin/drains: Pt skin intact w/some bruising.     P: Discharge pending blood culture results. Continue to monitor pt status and report changes to Cards 2.     Mali Mares RN

## 2020-11-17 NOTE — PLAN OF CARE
AOx4, VSS RA - BP different on both arms - doppler used, Tele: 100% Paced, up Ind - Blood and wound cultures sent to micro - results pending, LVAD Dressing changed, Intermittent IV antx, Continue to monitor and update Cards 2 w/any changes or concerns.  Discharge pending

## 2020-11-17 NOTE — PLAN OF CARE
6C PT Defer: patient reports being fully IND prior to admission and denies any mobility change this hospitalization. Further, RN reports patient IND for mobility in room. Patient with no acute PT needs at this time, PT to sign off. Encouraged patient to continue to mobilize IND while in house.

## 2020-11-18 ENCOUNTER — APPOINTMENT (OUTPATIENT)
Dept: GENERAL RADIOLOGY | Facility: CLINIC | Age: 65
DRG: 314 | End: 2020-11-18
Attending: INTERNAL MEDICINE
Payer: MEDICARE

## 2020-11-18 ENCOUNTER — PRE VISIT (OUTPATIENT)
Dept: INFECTIOUS DISEASES | Facility: CLINIC | Age: 65
End: 2020-11-18

## 2020-11-18 LAB
ANION GAP SERPL CALCULATED.3IONS-SCNC: 7 MMOL/L (ref 3–14)
BUN SERPL-MCNC: 42 MG/DL (ref 7–30)
CALCIUM SERPL-MCNC: 8 MG/DL (ref 8.5–10.1)
CHLORIDE SERPL-SCNC: 98 MMOL/L (ref 94–109)
CO2 SERPL-SCNC: 30 MMOL/L (ref 20–32)
CREAT SERPL-MCNC: 1.68 MG/DL (ref 0.66–1.25)
ERYTHROCYTE [DISTWIDTH] IN BLOOD BY AUTOMATED COUNT: 20.2 % (ref 10–15)
GFR SERPL CREATININE-BSD FRML MDRD: 41 ML/MIN/{1.73_M2}
GLUCOSE BLDC GLUCOMTR-MCNC: 126 MG/DL (ref 70–99)
GLUCOSE BLDC GLUCOMTR-MCNC: 147 MG/DL (ref 70–99)
GLUCOSE BLDC GLUCOMTR-MCNC: 148 MG/DL (ref 70–99)
GLUCOSE BLDC GLUCOMTR-MCNC: 169 MG/DL (ref 70–99)
GLUCOSE BLDC GLUCOMTR-MCNC: 170 MG/DL (ref 70–99)
GLUCOSE SERPL-MCNC: 138 MG/DL (ref 70–99)
HCT VFR BLD AUTO: 32.3 % (ref 40–53)
HGB BLD-MCNC: 9.5 G/DL (ref 13.3–17.7)
INR PPP: 2.55 (ref 0.86–1.14)
LACTATE BLD-SCNC: 0.8 MMOL/L (ref 0.7–2)
MCH RBC QN AUTO: 19.8 PG (ref 26.5–33)
MCHC RBC AUTO-ENTMCNC: 29.4 G/DL (ref 31.5–36.5)
MCV RBC AUTO: 67 FL (ref 78–100)
PLATELET # BLD AUTO: 530 10E9/L (ref 150–450)
POTASSIUM SERPL-SCNC: 3.3 MMOL/L (ref 3.4–5.3)
POTASSIUM SERPL-SCNC: 3.8 MMOL/L (ref 3.4–5.3)
RBC # BLD AUTO: 4.79 10E12/L (ref 4.4–5.9)
SODIUM SERPL-SCNC: 135 MMOL/L (ref 133–144)
WBC # BLD AUTO: 16.2 10E9/L (ref 4–11)

## 2020-11-18 PROCEDURE — 3E04329 INTRODUCTION OF OTHER ANTI-INFECTIVE INTO CENTRAL VEIN, PERCUTANEOUS APPROACH: ICD-10-PCS | Performed by: INTERNAL MEDICINE

## 2020-11-18 PROCEDURE — 36415 COLL VENOUS BLD VENIPUNCTURE: CPT | Performed by: INTERNAL MEDICINE

## 2020-11-18 PROCEDURE — 84132 ASSAY OF SERUM POTASSIUM: CPT | Performed by: INTERNAL MEDICINE

## 2020-11-18 PROCEDURE — 36568 INSJ PICC <5 YR W/O IMAGING: CPT

## 2020-11-18 PROCEDURE — 85610 PROTHROMBIN TIME: CPT | Performed by: STUDENT IN AN ORGANIZED HEALTH CARE EDUCATION/TRAINING PROGRAM

## 2020-11-18 PROCEDURE — 272N000456 ZZ HC KIT, 4 FR SL BIOFLO OPEN ENDED PICC

## 2020-11-18 PROCEDURE — 272N000277 HC DEVICE 4FR SECURACATH

## 2020-11-18 PROCEDURE — 99233 SBSQ HOSP IP/OBS HIGH 50: CPT | Performed by: INTERNAL MEDICINE

## 2020-11-18 PROCEDURE — 85027 COMPLETE CBC AUTOMATED: CPT | Performed by: STUDENT IN AN ORGANIZED HEALTH CARE EDUCATION/TRAINING PROGRAM

## 2020-11-18 PROCEDURE — 272N000201 ZZ HC ADHESIVE SKIN CLOSURE, DERMABOND

## 2020-11-18 PROCEDURE — 71045 X-RAY EXAM CHEST 1 VIEW: CPT | Mod: 26 | Performed by: RADIOLOGY

## 2020-11-18 PROCEDURE — 250N000011 HC RX IP 250 OP 636: Performed by: INTERNAL MEDICINE

## 2020-11-18 PROCEDURE — 258N000003 HC RX IP 258 OP 636: Performed by: STUDENT IN AN ORGANIZED HEALTH CARE EDUCATION/TRAINING PROGRAM

## 2020-11-18 PROCEDURE — 272N000103 HC INTRODUCER MICRO SET

## 2020-11-18 PROCEDURE — 250N000013 HC RX MED GY IP 250 OP 250 PS 637: Performed by: STUDENT IN AN ORGANIZED HEALTH CARE EDUCATION/TRAINING PROGRAM

## 2020-11-18 PROCEDURE — 05H933Z INSERTION OF INFUSION DEVICE INTO RIGHT BRACHIAL VEIN, PERCUTANEOUS APPROACH: ICD-10-PCS | Performed by: INTERNAL MEDICINE

## 2020-11-18 PROCEDURE — 36415 COLL VENOUS BLD VENIPUNCTURE: CPT | Performed by: STUDENT IN AN ORGANIZED HEALTH CARE EDUCATION/TRAINING PROGRAM

## 2020-11-18 PROCEDURE — 80048 BASIC METABOLIC PNL TOTAL CA: CPT | Performed by: STUDENT IN AN ORGANIZED HEALTH CARE EDUCATION/TRAINING PROGRAM

## 2020-11-18 PROCEDURE — 99232 SBSQ HOSP IP/OBS MODERATE 35: CPT | Mod: 25 | Performed by: INTERNAL MEDICINE

## 2020-11-18 PROCEDURE — 999N001017 HC STATISTIC GLUCOSE BY METER IP

## 2020-11-18 PROCEDURE — 83605 ASSAY OF LACTIC ACID: CPT | Performed by: INTERNAL MEDICINE

## 2020-11-18 PROCEDURE — 999N000065 XR CHEST PORT 1 VW

## 2020-11-18 PROCEDURE — 93750 INTERROGATION VAD IN PERSON: CPT | Performed by: INTERNAL MEDICINE

## 2020-11-18 PROCEDURE — 214N000001 HC R&B CCU UMMC

## 2020-11-18 PROCEDURE — 36592 COLLECT BLOOD FROM PICC: CPT | Performed by: INTERNAL MEDICINE

## 2020-11-18 PROCEDURE — 250N000009 HC RX 250

## 2020-11-18 PROCEDURE — 250N000013 HC RX MED GY IP 250 OP 250 PS 637: Performed by: INTERNAL MEDICINE

## 2020-11-18 RX ORDER — WARFARIN SODIUM 1 MG/1
1 TABLET ORAL
Status: COMPLETED | OUTPATIENT
Start: 2020-11-18 | End: 2020-11-18

## 2020-11-18 RX ORDER — HEPARIN SODIUM,PORCINE 10 UNIT/ML
2-5 VIAL (ML) INTRAVENOUS
Status: DISCONTINUED | OUTPATIENT
Start: 2020-11-18 | End: 2020-11-18

## 2020-11-18 RX ORDER — CEFAZOLIN SODIUM 2 G/100ML
2 INJECTION, SOLUTION INTRAVENOUS EVERY 8 HOURS
Qty: 12600 ML | Refills: 0 | Status: SHIPPED | OUTPATIENT
Start: 2020-11-18 | End: 2020-11-19

## 2020-11-18 RX ORDER — POTASSIUM CHLORIDE 750 MG/1
40 TABLET, EXTENDED RELEASE ORAL ONCE
Status: COMPLETED | OUTPATIENT
Start: 2020-11-18 | End: 2020-11-18

## 2020-11-18 RX ORDER — LIDOCAINE 40 MG/G
CREAM TOPICAL
Status: DISCONTINUED | OUTPATIENT
Start: 2020-11-18 | End: 2020-11-19 | Stop reason: HOSPADM

## 2020-11-18 RX ORDER — CEFAZOLIN SODIUM 2 G/100ML
2 INJECTION, SOLUTION INTRAVENOUS EVERY 8 HOURS
Qty: 12600 ML | Refills: 0 | Status: SHIPPED | OUTPATIENT
Start: 2020-11-18 | End: 2020-11-18

## 2020-11-18 RX ADMIN — MULTIPLE VITAMINS W/ MINERALS TAB 1 TABLET: TAB at 08:09

## 2020-11-18 RX ADMIN — BUMETANIDE 2 MG: 2 TABLET ORAL at 08:10

## 2020-11-18 RX ADMIN — CEFAZOLIN SODIUM 2 G: 2 INJECTION, SOLUTION INTRAVENOUS at 12:19

## 2020-11-18 RX ADMIN — AMIODARONE HYDROCHLORIDE 200 MG: 200 TABLET ORAL at 08:09

## 2020-11-18 RX ADMIN — LISINOPRIL 10 MG: 10 TABLET ORAL at 08:10

## 2020-11-18 RX ADMIN — ALLOPURINOL 200 MG: 100 TABLET ORAL at 08:10

## 2020-11-18 RX ADMIN — CEFAZOLIN SODIUM 2 G: 2 INJECTION, SOLUTION INTRAVENOUS at 22:36

## 2020-11-18 RX ADMIN — SODIUM CHLORIDE 300 ML: 9 INJECTION, SOLUTION INTRAVENOUS at 04:11

## 2020-11-18 RX ADMIN — TAMSULOSIN HYDROCHLORIDE 0.4 MG: 0.4 CAPSULE ORAL at 08:09

## 2020-11-18 RX ADMIN — MAGNESIUM OXIDE 400 MG: 400 TABLET ORAL at 08:09

## 2020-11-18 RX ADMIN — HYDRALAZINE HYDROCHLORIDE 100 MG: 100 TABLET, FILM COATED ORAL at 20:59

## 2020-11-18 RX ADMIN — INSULIN GLARGINE 10 UNITS: 100 INJECTION, SOLUTION SUBCUTANEOUS at 22:36

## 2020-11-18 RX ADMIN — FLUOXETINE 20 MG: 20 CAPSULE ORAL at 08:09

## 2020-11-18 RX ADMIN — ZOLPIDEM TARTRATE 5 MG: 5 TABLET ORAL at 21:09

## 2020-11-18 RX ADMIN — CEFAZOLIN SODIUM 2 G: 2 INJECTION, SOLUTION INTRAVENOUS at 03:00

## 2020-11-18 RX ADMIN — ASPIRIN 81 MG: 81 TABLET, DELAYED RELEASE ORAL at 08:09

## 2020-11-18 RX ADMIN — METHOCARBAMOL 500 MG: 500 TABLET, FILM COATED ORAL at 20:59

## 2020-11-18 RX ADMIN — WARFARIN SODIUM 1 MG: 1 TABLET ORAL at 18:18

## 2020-11-18 RX ADMIN — FINASTERIDE 5 MG: 5 TABLET, FILM COATED ORAL at 08:09

## 2020-11-18 RX ADMIN — INSULIN ASPART 1 UNITS: 100 INJECTION, SOLUTION INTRAVENOUS; SUBCUTANEOUS at 13:11

## 2020-11-18 RX ADMIN — HYDRALAZINE HYDROCHLORIDE 100 MG: 100 TABLET, FILM COATED ORAL at 08:10

## 2020-11-18 RX ADMIN — BUMETANIDE 2 MG: 2 TABLET ORAL at 14:38

## 2020-11-18 RX ADMIN — POTASSIUM CHLORIDE 40 MEQ: 750 TABLET, EXTENDED RELEASE ORAL at 12:19

## 2020-11-18 RX ADMIN — POLYETHYLENE GLYCOL 3350 17 G: 17 POWDER, FOR SOLUTION ORAL at 08:10

## 2020-11-18 RX ADMIN — LIDOCAINE HYDROCHLORIDE 5 ML: 10 INJECTION, SOLUTION EPIDURAL; INFILTRATION; INTRACAUDAL; PERINEURAL at 11:42

## 2020-11-18 RX ADMIN — HYDRALAZINE HYDROCHLORIDE 100 MG: 100 TABLET, FILM COATED ORAL at 14:38

## 2020-11-18 RX ADMIN — INSULIN ASPART 1 UNITS: 100 INJECTION, SOLUTION INTRAVENOUS; SUBCUTANEOUS at 08:19

## 2020-11-18 ASSESSMENT — ACTIVITIES OF DAILY LIVING (ADL)
ADLS_ACUITY_SCORE: 14

## 2020-11-18 ASSESSMENT — MIFFLIN-ST. JEOR: SCORE: 1686.64

## 2020-11-18 NOTE — PLAN OF CARE
D: Pt admit 11/14/20 for driveline infection. PMH NICM LVEF 15-20% s/p HM3 LVAD 2/18/20 c/b MSSA driveline infection (11/5/20), moderate nonobstructive CAD, severe MR,  ABHINAV on CPAP, DM2, CKD III, HLD, ANA, prior tobacco use.      I/A:   Neuro: A&Ox4  VS: VSS. RA. Home CPAP overnight. Doppler MAP  LVAD #'s: PI # between 1.5-1.8 when taking 0400 VS. See provider notification note.300 ml bolus NS administered w/some improvement. Pt PI # 2.7 this AM. Dressing changed w/betadine last night.   Tele: Paced   Pain: Denies  GI/: Urinating adequately. Bladder scan did not indicate urinary retention last night. Pt aware to call with any abdominal discomfort. No BM this shift.  Diet: Regular  BG/insulin: BG check ACHS. Insulin administered per protocol.   IV/Drips: L PIV TKO.  Activity: Independent. Pt aware to call before getting up w/ low PI numbers in case of orthostatic hypotension.   Skin/drains: Pt skin intact w/some bruising.      P: Plan for PICC placement for home IV abx and possible discharge today or tomorrow. Continue to monitor pt status and report changes to Cards 2.      Mali Mares RN

## 2020-11-18 NOTE — PROVIDER NOTIFICATION
Pt PI # on LVAD 1.5-1.8. Pt asymptomatic. BP stable 98/73 MAP 80. . Provider notified, 300 ml bolus NS administered per provider request.     Mali Mares RN

## 2020-11-18 NOTE — PLAN OF CARE
AOx4, VSS RA, LVAD numbers WDL, Tele: /Tachy, up Ind - Pt experiencing urinary retention today and needed to be bladder scanned, each time greater than 600ml.  First straight cath for 880, second for 750 - flomax started for enlarged prostate.  Pt c/o constipation - PRN suppository ordered, but not given as pt had large BM right before med was pulled.  Plan for PICC line tomorrow and to go home on IV antx.  Discharge for 11/18 or 11/19.  Continue to monitor and update Cards 2 w/any changes/concerns

## 2020-11-18 NOTE — PROGRESS NOTES
LVAD Social Work Services Progress Note      Date of Initial Social Work Evaluation: 11/17/2020  Collaborated with: Mi Leos, RNCC, pt's wife, Chapis, via phone ([phone]) and pt    Data: Pt is s/p LVAD implant 2/18/2020. Pt admitted for drive line infection and will need 6 weeks of IV abx. Refer to RNCC note for complete details, confirmed with pt's local hospital to go in for q8 infusions. Pt and wife prefer going to their local hospital vs going to a TCU. Pt does not have insurance coverage for home infusion.   Discussed with medical team and pt is not medically ready for discharge. Pt scheduled for CHAMP tomorrow.     Intervention: Supportive Visit   Assessment: Reviewed discharge plan with pt and he expressed understanding. Pt's wife was updated that pt would not discharge today. Pt's wife coming from 3.5hrs away and will need some notice to come and  pt.   Education provided by SW: Ongoing Social Work support, discharge planning   Plan:    Discharge Plans in Progress: Home w/ outpatient IV abx at local hospital-refer to RNCC note     Barriers to d/c plan: Medical Readiness     Follow up Plan: SW to continue to follow.

## 2020-11-18 NOTE — PROCEDURES
[facility]     Single Lumen PICC Placement    Date/Time: 11/18/2020 1:56 PM  Performed by: Nancy Ashraf RN  Authorized by: Nader Cisneros MD   Indications: vascular access    UNIVERSAL PROTOCOL   Site Marked: Yes  Prior Images Obtained and Reviewed:  Yes  Required items: Required blood products, implants, devices and special equipment available    Patient identity confirmed:  Verbally with patient, arm band and hospital-assigned identification number  NA - No sedation, light sedation, or local anesthesia  Confirmation Checklist:  Patient's identity using two indicators, relevant allergies, procedure was appropriate and matched the consent or emergent situation and correct equipment/implants were available  Time out: Immediately prior to the procedure a time out was called    Universal Protocol: the Joint Commission Universal Protocol was followed    Preparation: Patient was prepped and draped in usual sterile fashion           ANESTHESIA    Anesthesia: Local infiltration  Local Anesthetic:  Lidocaine 1% without epinephrine      SEDATION    Patient Sedated: No        Preparation: skin prepped with ChloraPrep  Skin prep agent: skin prep agent completely dried prior to procedure  Sterile barriers: maximum sterile barriers were used: cap, mask, sterile gown, sterile gloves, and large sterile sheet  Hand hygiene: hand hygiene performed prior to central venous catheter insertion  Type of line used: PICC and Power PICC  Catheter type: single lumen  Lumen type: non-valved  Catheter size: 4 Fr  Brand: Bard  Lot number: [id number]  Placement method: MST, ultrasound, venipuncture and tip confirmation system  Number of attempts: 1  Successful placement: yes  Orientation: right  Location: brachial vein (medial)  Arm circumference: adults 10 cm  Extremity circumference: 30  Visible catheter length: 3  Total catheter length: 41  Dressing and securement: blood cleaned  with CHG, chlorhexidine disc applied, dressing applied, line secured, glue, securement device, site cleaned and sterile dressing applied  Post procedure assessment: blood return through all ports, free fluid flow and placement verified by x-ray  PROCEDURE   Patient Tolerance:  Patient tolerated the procedure well with no immediate complications  Describe Procedure: PICC ok to use

## 2020-11-18 NOTE — PROGRESS NOTES
[facility]     Cardiology Progress Note- Cards 2        Date of Admission:  11/14/2020     Assessment & Plan: HVSL   Mr. Tanner is a 65 year old male who has a past medical history significant for NICM LVEF 15-20% s/p LVAD HM3 2/18/20 c/b by MSSA driveline infection (11/5/20), moderate nonobstructive CAD, severe MR, HTN, ABHINAV (uses CPAP), DM2, CKD III, HLD, ANA, and prior tobacco use who was admitted directly from ID clinic with GPC bacteremia, likely MSSA.     Changes today:  - PICC Placed  - continue cefazolin 2g IV q8hrs   - SW will help set up home infusion teaching prior to discharge  - will be on IV abx for 6 weeks. F/u on SW re continuous infusion option for discharge at local hospital  - CHAMP in the AM r/o of endocarditis vegetations      #Previous MSSA driveline infection  #Staphylococcus aureus Bacteremia (Likely MSSA), driveline infection  #Diarrhea, resolved  Initially developed pain followed by drainage ~11/1, started on doxycycline on 11/5 but switched to TMP/SMX when the culture revealed MSSA resistant to doxy-subsequently. Blood cultures obtained which were negative. After starting TMP/SMX there was some improvement in the pain/drainage although not resolved.1/14 directly admitted by ID after BC positive for GPC, likely MSSA. Discussed with transplant ID who recommend repeat blood cultures, abdominal ultrasound of driveline site, and starting Cefazolin. Of note, patient did have a few days of diarrhea which had improved on day of admission and resolved since 11/15 (C.diff negative).    Most recently, abdominal imaging (US and CT abd/pelvis) revealed no intraabdominal fluid collection concerning for abscess/infectious process. TTE did not comment on vegetation. ID recommended CHAMP once again today and we were on board with moving forward for r/o endocarditis as it may require removal/replacement of device.   - Transplant ID following, appreciate recs    - BCx2- 11/15,11/16,11/17 NGTD (pending)    > Repeat until negative for 48-72 hrs, then can place PICC line (perhaps placement 11/18)  - Stopped bactrim (11/5 - 11/14)   - Started Cefazolin 2g IV q8hrs per ID recs (11/14 - 11/*)  - SW will help set up home infusion teaching prior to discharge  - will be on IV abx for 6 weeks   - C.diff negative      #Inability to void  Has been having issues of urinary voiding 11/17 AM.  mL and  mL. Resolved with intermittent cath. US renal showed no hydronephrosis bilaterally. UA negative for UTI. Most likely due to undiagnosed BPH, will trial tamsulosin and finasteride.  - No lombardo (since high infection risk in LVAD patients)  - intermittent cath  - PVR PRN  - start tamsulosin 0.4 mg daily   - start finasteride 5 mg daily    #NICM LVEF 15-20%, NYHA III s/p HM III LVAD 2/2020  - ACEi/ARB: Lisinopril 10mg daily   - Fluid status: Hypervolemic     > Diuresis: stopped PTA Furosemide 40 mg daily (continue Bumetanide 2mg per oral BID)  - Afterload reduction: Hydralazine 100mg per oral TID    >> Will likely require more MAP control on discharge (ie. Amlodipine)   - BB: Not on d/t RV dysfunction   - Aldosterone antagonist: Not on due to variable renal function  - SCD prophylaxis: s/p ICD stable lead parameters and normal device function   - Fluid status: Hypervolemic     #Atrial Fibrillation, VT/VF     S/p DCCV with some residual self-limiting AF.   - Amiodarone 200mg daily  - Warfarin, pharmacy to dose, hold given high INR  - Daily INR (goal 2-3)    #Supratherapeutic INR    > Multifactorial    No symptoms/signs of active bleeding. Suspect elevated INR d/t polypharmacy including recent addition of TMP/SMX/Amiodarone and poor appetite.  INR continues to trend down after giving 1x PO vit K, INR on 11/16 4.38.  - s/p one time dose of 1mg of oral Vitamin K  - Monitor for signs/symptoms of bleeding   - Monitor INR daily  - Restarted Warfarin. Pharm to determing warfarin plan  going forward. Will need INR check with outpatient this Friday.    #CKD (chronic kidney disease) stage 4, GFR 15-29 ml/min (H) with progressive creatinine rise in last 1-2 months  Follows with nephrology as outpatient. CKD Pre-dated VAD. Initially d/t HTN +/- DM as this was more recently diagnosed. Also required iHD post cardiac arrest. HIV/HBV/HCV negative, SPEP negative, hemolysis unlikely. Previous UA non-nephritc. Renal/bladder ultrasound done as outpatient at outside clinic last week, awaiting results, has another scheduled for 12/15/2020. To this day, no clear etiology has been identified. Nephrology thinks that perhaps this new Cr increase may be due to the following possibilities: HTN pre-dating VAD+needing HD for BERNARDA s/p MI. There may be a component of increased systemic vascular resistance promoting a pathological process within the spectrum of cardiorenal etiologies. Will continue to optimize afterload reducing medications.   - F/u on OSH renal/bladder ultrasound results   - Avoid nephrotoxic agents  - Daily BMP   - Monitor I/O, electrolytes     #Microcytic anemia  Multifactorial and d/t likely ACD and mild hemolysis from VAD. There is a ~40% increased risk of GI bleeding within 2 years of LVAD placement. No active signs of bleeding. Iron study WNL.  - Daily HGB  - Transfuse <7g/dL    # Diabetes Mellitus type II  - sliding scale insulin  - 10 units of glargine  at bedtime     Diet: Regular  DVT Prophylaxis: Warfarin, held INR supratherapeutic   Code Status: Full  Fluids: PO  Lines: PIV, driveline    Diet:     DVT Prophylaxis: Warfarin  Guevara Catheter: not present  Code Status:         Disposition Plan   Expected discharge: Tomorrow, recommended to prior living arrangement once social factors have been resolved. Medication has been settled..      Entered: Ray Daugherty MD 11/18/2020, 4:34 PM       The patient's care was discussed with the Attending Physician, Dr. Cisneros.    MD JIM Mcnair  [facility]   Please see sign in/sign out for up to date coverage information  ______________________________________________________________________    Interval History   No acute events overnight.    Data reviewed today: I reviewed all medications, new labs and imaging results over the last 24 hours. I personally reviewed no imaging or EKG's to review for today.     Physical Exam   Vital Signs: Temp: 99.1  F (37.3  C) Temp src: Oral BP: 126/72 Pulse: 118   Resp: 18 SpO2: 94 % O2 Device: None (Room air)    Weight: 210 lbs 1.6 oz     General: Well-nourished, NAD, lying in bed comfortable, speaking in complete sentences, appropriate   Eyes: PERRL, conjunctivae pink no scleral icterus  ENT: Moist mucus membranes  Respiratory: Lungs clear to auscultation bilaterally, no crackles/rubs/wheezes. Good air movement  CV: Continues flow murmur, no LE/sacral edema  GI:  Abdomen distended. Clean dressing over driveline, no surrounding erythema. Nontender to palpation. No guarding or rebound.   Skin: Warm, dry. No rashes or petechiae  Musculoskeletal: No peripheral edema or calf tenderness  Neuro: Alert and oriented to person/place/time  Psychiatric: Normal affect     Data   Recent Labs   Lab 11/18/20  1602 11/18/20  0540 11/17/20  0549 11/16/20  0616 11/14/20  2041 11/14/20  2041 11/12/20  0000 11/12/20   WBC  --  16.2* 17.1* 15.7*   < > 18.4*  --   --    HGB  --  9.5* 9.6* 9.7*   < > 10.5*  --   --    MCV  --  67* 69* 67*   < > 69*  --   --    PLT  --  530* 472* 464*   < > 430  --   --    INR  --  2.55* 3.40* 4.38*   < > 6.21*   < >  --    NA  --  135 135 134   < > 134  --  135   POTASSIUM 3.8 3.3* 3.5 3.8   < > 4.9  --  4.5   CHLORIDE  --  98 101 100   < > 102  --  101.0   CO2  --  30 27 25   < > 25  --  22.0   BUN  --  42* 41* 30   < > 35*  --  41*   CR  --  1.68* 1.77* 1.66*   < > 1.98*  --  2.2*   ANIONGAP  --  7 7 8   < > 7  --  12   CALOS  --  8.0* 7.9* 8.2*   < > 8.8  --  8.6    GLC  --  138* 141* 113*   < > 194*  --  174*   ALBUMIN  --   --   --   --   --  2.6*  --  3.7   PROTTOTAL  --   --   --   --   --  7.8  --  7.6   BILITOTAL  --   --   --   --   --  0.7  --  0.5   ALKPHOS  --   --   --   --   --  184*  --  202.0*   ALT  --   --   --   --   --  56  --  64*   AST  --   --   --   --   --  48*  --  65.0*    < > = values in this interval not displayed.

## 2020-11-18 NOTE — PROGRESS NOTES
Care Management Discharge Note    Discharge Date: 11/18/20  Expected Time of Departure: 1630    Discharge Disposition: Outpatient Infusion Services    Discharge Services: Outpatient Infusion    Discharge DME: (None)    Discharge Transportation: family or friend will provide    Patient/family educated on Medicare website which has current facility and service quality ratings: no    Education Provided on the Discharge Plan:  Yes  Persons Notified of Discharge Plans: Yes  Patient/Family in Agreement with the Plan: yes      Additional Information:  Pt status reviewed/discussed during care team rounds.  Team anticipates possible discharge tomorrow pending CHAMP.  Pt will discharge on q 8 hour IV Cefazolin.  SW confirmed with pt/spouse that they would prefer to do outpatient infusions at [facility].  Writer confirmed with [facility] that they are able to accommodate outpatient q 8 IV antibiotic regiment.  Hiral requests prescription and orders be faxed to [phone].   Orders should list labs needed, line care needs and who facility needs to call for labs and follow up.  Reviewed with Cards 2 team.  Discharge orders updated with [facility] information and order for PICC site care per facility protocol.    Reviewed with KRISTEN Garcia.  VANITA agreed to f/u with pt and spouse regarding plan.      Sarah Houser RN

## 2020-11-19 ENCOUNTER — PATIENT OUTREACH (OUTPATIENT)
Dept: CARE COORDINATION | Facility: CLINIC | Age: 65
End: 2020-11-19

## 2020-11-19 ENCOUNTER — TELEPHONE (OUTPATIENT)
Dept: ANTICOAGULATION | Facility: CLINIC | Age: 65
End: 2020-11-19

## 2020-11-19 ENCOUNTER — APPOINTMENT (OUTPATIENT)
Dept: CARDIOLOGY | Facility: CLINIC | Age: 65
DRG: 314 | End: 2020-11-19
Attending: INTERNAL MEDICINE
Payer: MEDICARE

## 2020-11-19 VITALS
HEART RATE: 117 BPM | TEMPERATURE: 98.7 F | BODY MASS INDEX: 32.98 KG/M2 | OXYGEN SATURATION: 95 % | WEIGHT: 210.1 LBS | SYSTOLIC BLOOD PRESSURE: 75 MMHG | DIASTOLIC BLOOD PRESSURE: 67 MMHG | HEIGHT: 67 IN | RESPIRATION RATE: 18 BRPM

## 2020-11-19 DIAGNOSIS — I48.0 PAROXYSMAL ATRIAL FIBRILLATION (H): ICD-10-CM

## 2020-11-19 DIAGNOSIS — Z95.811 LVAD (LEFT VENTRICULAR ASSIST DEVICE) PRESENT (H): ICD-10-CM

## 2020-11-19 DIAGNOSIS — I50.22 CHRONIC SYSTOLIC CONGESTIVE HEART FAILURE (H): ICD-10-CM

## 2020-11-19 LAB
ANION GAP SERPL CALCULATED.3IONS-SCNC: 5 MMOL/L (ref 3–14)
BACTERIA SPEC CULT: ABNORMAL
BUN SERPL-MCNC: 40 MG/DL (ref 7–30)
CALCIUM SERPL-MCNC: 8.2 MG/DL (ref 8.5–10.1)
CHLORIDE SERPL-SCNC: 98 MMOL/L (ref 94–109)
CO2 SERPL-SCNC: 32 MMOL/L (ref 20–32)
CREAT SERPL-MCNC: 1.56 MG/DL (ref 0.66–1.25)
ERYTHROCYTE [DISTWIDTH] IN BLOOD BY AUTOMATED COUNT: 20.8 % (ref 10–15)
GFR SERPL CREATININE-BSD FRML MDRD: 45 ML/MIN/{1.73_M2}
GLUCOSE BLDC GLUCOMTR-MCNC: 132 MG/DL (ref 70–99)
GLUCOSE BLDC GLUCOMTR-MCNC: 139 MG/DL (ref 70–99)
GLUCOSE BLDC GLUCOMTR-MCNC: 140 MG/DL (ref 70–99)
GLUCOSE SERPL-MCNC: 119 MG/DL (ref 70–99)
HCT VFR BLD AUTO: 31.9 % (ref 40–53)
HGB BLD-MCNC: 9.5 G/DL (ref 13.3–17.7)
INR PPP: 2.05 (ref 0.86–1.14)
Lab: ABNORMAL
MCH RBC QN AUTO: 20.3 PG (ref 26.5–33)
MCHC RBC AUTO-ENTMCNC: 29.8 G/DL (ref 31.5–36.5)
MCV RBC AUTO: 68 FL (ref 78–100)
PLATELET # BLD AUTO: 492 10E9/L (ref 150–450)
POTASSIUM SERPL-SCNC: 3.4 MMOL/L (ref 3.4–5.3)
POTASSIUM SERPL-SCNC: 3.9 MMOL/L (ref 3.4–5.3)
RBC # BLD AUTO: 4.67 10E12/L (ref 4.4–5.9)
SODIUM SERPL-SCNC: 134 MMOL/L (ref 133–144)
SPECIMEN SOURCE: ABNORMAL
SPECIMEN SOURCE: ABNORMAL
WBC # BLD AUTO: 13.7 10E9/L (ref 4–11)

## 2020-11-19 PROCEDURE — 93312 ECHO TRANSESOPHAGEAL: CPT | Mod: 26 | Performed by: INTERNAL MEDICINE

## 2020-11-19 PROCEDURE — 99239 HOSP IP/OBS DSCHRG MGMT >30: CPT | Mod: 25 | Performed by: INTERNAL MEDICINE

## 2020-11-19 PROCEDURE — 93325 DOPPLER ECHO COLOR FLOW MAPG: CPT | Mod: 26 | Performed by: INTERNAL MEDICINE

## 2020-11-19 PROCEDURE — 85027 COMPLETE CBC AUTOMATED: CPT | Performed by: STUDENT IN AN ORGANIZED HEALTH CARE EDUCATION/TRAINING PROGRAM

## 2020-11-19 PROCEDURE — 250N000011 HC RX IP 250 OP 636: Performed by: INTERNAL MEDICINE

## 2020-11-19 PROCEDURE — 93312 ECHO TRANSESOPHAGEAL: CPT

## 2020-11-19 PROCEDURE — 36592 COLLECT BLOOD FROM PICC: CPT | Performed by: INTERNAL MEDICINE

## 2020-11-19 PROCEDURE — 99153 MOD SED SAME PHYS/QHP EA: CPT | Performed by: INTERNAL MEDICINE

## 2020-11-19 PROCEDURE — 84132 ASSAY OF SERUM POTASSIUM: CPT | Performed by: STUDENT IN AN ORGANIZED HEALTH CARE EDUCATION/TRAINING PROGRAM

## 2020-11-19 PROCEDURE — 250N000013 HC RX MED GY IP 250 OP 250 PS 637: Performed by: STUDENT IN AN ORGANIZED HEALTH CARE EDUCATION/TRAINING PROGRAM

## 2020-11-19 PROCEDURE — 85610 PROTHROMBIN TIME: CPT | Performed by: STUDENT IN AN ORGANIZED HEALTH CARE EDUCATION/TRAINING PROGRAM

## 2020-11-19 PROCEDURE — 36592 COLLECT BLOOD FROM PICC: CPT | Performed by: STUDENT IN AN ORGANIZED HEALTH CARE EDUCATION/TRAINING PROGRAM

## 2020-11-19 PROCEDURE — 80048 BASIC METABOLIC PNL TOTAL CA: CPT | Performed by: STUDENT IN AN ORGANIZED HEALTH CARE EDUCATION/TRAINING PROGRAM

## 2020-11-19 PROCEDURE — 250N000013 HC RX MED GY IP 250 OP 250 PS 637: Performed by: NURSE PRACTITIONER

## 2020-11-19 PROCEDURE — 250N000009 HC RX 250: Performed by: INTERNAL MEDICINE

## 2020-11-19 PROCEDURE — 84132 ASSAY OF SERUM POTASSIUM: CPT | Performed by: INTERNAL MEDICINE

## 2020-11-19 PROCEDURE — 999N001017 HC STATISTIC GLUCOSE BY METER IP

## 2020-11-19 PROCEDURE — 99152 MOD SED SAME PHYS/QHP 5/>YRS: CPT | Performed by: INTERNAL MEDICINE

## 2020-11-19 PROCEDURE — 93750 INTERROGATION VAD IN PERSON: CPT | Performed by: INTERNAL MEDICINE

## 2020-11-19 PROCEDURE — 93320 DOPPLER ECHO COMPLETE: CPT | Mod: 26 | Performed by: INTERNAL MEDICINE

## 2020-11-19 RX ORDER — LIDOCAINE HYDROCHLORIDE 20 MG/ML
15 SOLUTION OROPHARYNGEAL ONCE
Status: COMPLETED | OUTPATIENT
Start: 2020-11-19 | End: 2020-11-19

## 2020-11-19 RX ORDER — POTASSIUM CHLORIDE 750 MG/1
40 TABLET, EXTENDED RELEASE ORAL ONCE
Status: COMPLETED | OUTPATIENT
Start: 2020-11-19 | End: 2020-11-19

## 2020-11-19 RX ORDER — WARFARIN SODIUM 3 MG/1
3 TABLET ORAL
Status: DISCONTINUED | OUTPATIENT
Start: 2020-11-19 | End: 2020-11-19 | Stop reason: HOSPADM

## 2020-11-19 RX ORDER — NALOXONE HYDROCHLORIDE 0.4 MG/ML
0.2 INJECTION, SOLUTION INTRAMUSCULAR; INTRAVENOUS; SUBCUTANEOUS
Status: DISCONTINUED | OUTPATIENT
Start: 2020-11-19 | End: 2020-11-19 | Stop reason: HOSPADM

## 2020-11-19 RX ORDER — NALOXONE HYDROCHLORIDE 0.4 MG/ML
0.4 INJECTION, SOLUTION INTRAMUSCULAR; INTRAVENOUS; SUBCUTANEOUS
Status: DISCONTINUED | OUTPATIENT
Start: 2020-11-19 | End: 2020-11-19 | Stop reason: HOSPADM

## 2020-11-19 RX ORDER — CEFAZOLIN SODIUM 2 G/100ML
2 INJECTION, SOLUTION INTRAVENOUS EVERY 8 HOURS
Qty: 12600 ML | Refills: 0 | Status: SHIPPED | OUTPATIENT
Start: 2020-11-19 | End: 2020-12-27

## 2020-11-19 RX ORDER — WARFARIN SODIUM 3 MG/1
3 TABLET ORAL DAILY
Qty: 4 TABLET | Refills: 0 | Status: SHIPPED | OUTPATIENT
Start: 2020-11-19 | End: 2021-01-05

## 2020-11-19 RX ORDER — ACYCLOVIR 200 MG/1
9.5 CAPSULE ORAL
Status: DISCONTINUED | OUTPATIENT
Start: 2020-11-19 | End: 2020-11-19 | Stop reason: HOSPADM

## 2020-11-19 RX ORDER — FENTANYL CITRATE 50 UG/ML
25 INJECTION, SOLUTION INTRAMUSCULAR; INTRAVENOUS
Status: DISCONTINUED | OUTPATIENT
Start: 2020-11-19 | End: 2020-11-19 | Stop reason: HOSPADM

## 2020-11-19 RX ORDER — LIDOCAINE 40 MG/G
CREAM TOPICAL
Status: DISCONTINUED | OUTPATIENT
Start: 2020-11-19 | End: 2020-11-19 | Stop reason: HOSPADM

## 2020-11-19 RX ORDER — SODIUM CHLORIDE 9 MG/ML
INJECTION, SOLUTION INTRAVENOUS CONTINUOUS PRN
Status: DISCONTINUED | OUTPATIENT
Start: 2020-11-19 | End: 2020-11-19 | Stop reason: HOSPADM

## 2020-11-19 RX ORDER — FLUMAZENIL 0.1 MG/ML
0.2 INJECTION, SOLUTION INTRAVENOUS
Status: DISCONTINUED | OUTPATIENT
Start: 2020-11-19 | End: 2020-11-19 | Stop reason: HOSPADM

## 2020-11-19 RX ORDER — CEFAZOLIN SODIUM 2 G/100ML
2 INJECTION, SOLUTION INTRAVENOUS EVERY 8 HOURS
Qty: 12600 ML | Refills: 0 | Status: SHIPPED | OUTPATIENT
Start: 2020-11-19 | End: 2020-11-19

## 2020-11-19 RX ADMIN — ALLOPURINOL 200 MG: 100 TABLET ORAL at 08:10

## 2020-11-19 RX ADMIN — HYDRALAZINE HYDROCHLORIDE 100 MG: 100 TABLET, FILM COATED ORAL at 13:33

## 2020-11-19 RX ADMIN — FENTANYL CITRATE 25 MCG: 50 INJECTION, SOLUTION INTRAMUSCULAR; INTRAVENOUS at 09:13

## 2020-11-19 RX ADMIN — POTASSIUM CHLORIDE 40 MEQ: 750 TABLET, EXTENDED RELEASE ORAL at 08:10

## 2020-11-19 RX ADMIN — BUMETANIDE 2 MG: 2 TABLET ORAL at 13:33

## 2020-11-19 RX ADMIN — FLUOXETINE 20 MG: 20 CAPSULE ORAL at 08:10

## 2020-11-19 RX ADMIN — MAGNESIUM OXIDE 400 MG: 400 TABLET ORAL at 08:10

## 2020-11-19 RX ADMIN — FENTANYL CITRATE 25 MCG: 50 INJECTION, SOLUTION INTRAMUSCULAR; INTRAVENOUS at 09:11

## 2020-11-19 RX ADMIN — TAMSULOSIN HYDROCHLORIDE 0.4 MG: 0.4 CAPSULE ORAL at 08:10

## 2020-11-19 RX ADMIN — CEFAZOLIN SODIUM 2 G: 2 INJECTION, SOLUTION INTRAVENOUS at 06:39

## 2020-11-19 RX ADMIN — AMIODARONE HYDROCHLORIDE 200 MG: 200 TABLET ORAL at 08:10

## 2020-11-19 RX ADMIN — FINASTERIDE 5 MG: 5 TABLET, FILM COATED ORAL at 08:10

## 2020-11-19 RX ADMIN — MIDAZOLAM 1 MG: 1 INJECTION INTRAMUSCULAR; INTRAVENOUS at 09:08

## 2020-11-19 RX ADMIN — CEFAZOLIN SODIUM 2 G: 2 INJECTION, SOLUTION INTRAVENOUS at 13:34

## 2020-11-19 RX ADMIN — MIDAZOLAM 1 MG: 1 INJECTION INTRAMUSCULAR; INTRAVENOUS at 09:11

## 2020-11-19 RX ADMIN — BUMETANIDE 2 MG: 2 TABLET ORAL at 08:10

## 2020-11-19 RX ADMIN — MULTIPLE VITAMINS W/ MINERALS TAB 1 TABLET: TAB at 08:12

## 2020-11-19 RX ADMIN — FENTANYL CITRATE 50 MCG: 50 INJECTION, SOLUTION INTRAMUSCULAR; INTRAVENOUS at 09:08

## 2020-11-19 RX ADMIN — TOPICAL ANESTHETIC 0.5 ML: 200 SPRAY DENTAL; PERIODONTAL at 08:56

## 2020-11-19 RX ADMIN — LIDOCAINE HYDROCHLORIDE 30 ML: 20 SOLUTION ORAL; TOPICAL at 08:56

## 2020-11-19 RX ADMIN — HYDRALAZINE HYDROCHLORIDE 100 MG: 100 TABLET, FILM COATED ORAL at 08:10

## 2020-11-19 RX ADMIN — ASPIRIN 81 MG: 81 TABLET, DELAYED RELEASE ORAL at 08:10

## 2020-11-19 RX ADMIN — LISINOPRIL 10 MG: 10 TABLET ORAL at 08:10

## 2020-11-19 ASSESSMENT — ACTIVITIES OF DAILY LIVING (ADL)
ADLS_ACUITY_SCORE: 14

## 2020-11-19 NOTE — PRE-PROCEDURE
GENERAL PRE-PROCEDURE:   Procedure:  Trans esophageal echocardiogram  Date/Time:  11/19/2020 8:59 AM    Verbal consent obtained?: Yes    Written consent obtained?: Yes    Risks and benefits: Risks, benefits and alternatives were discussed    Consent given by:  Patient  Patient states understanding of procedure being performed: Yes    Patient's understanding of procedure matches consent: Yes    Procedure consent matches procedure scheduled: Yes    Appropriately NPO:  Yes  ASA Class:  Class 3- Severe systemic disease, definite functional limitations  Mallampati  :  Grade 2- soft palate, base of uvula, tonsillar pillars, and portion of posterior pharyngeal wall visible  History & Physical reviewed:  History and physical reviewed and no updates needed  Statement of review:  I have reviewed the lab findings, diagnostic data, medications, and the plan for sedation

## 2020-11-19 NOTE — PLAN OF CARE
Pt NPO for CHAMP with possible discharge to home today with home infusions of ABx. VAD alarms and numbers unremarkable. VSS. Denies pain. Continuing to monitor.    Pt to discharge home with wife after in-hospital education at 1600.

## 2020-11-19 NOTE — PLAN OF CARE
Pt discharged to home after he and his wife receiving education by home infusion at the bedside. PICC remains in, tele off. NST took the pt and his wife downstairs in a WC to the pharmacy and then the front door. Discharge paperwork completed by the day shift RN.

## 2020-11-19 NOTE — PLAN OF CARE
0700-1930  D: Patient is a 65 year old male admitted 11/14 for drive line infection(MSSA) with a PMHx of NICM LVEF 15-20% s/p HM3 LVAD 2/18/20 c/b MSSA driveline infection (11/5/20), moderate nonobstructive CAD, severe MR,  ABHINAV on CPAP, DM2, CKD III, HLD, ANA, prior tobacco use.    I: : Monitored vitals and assessed pt status.   Changed: Single lumen PICC placed; changed timing for abx  Running: TKO for abx  PRN: would like robaxin before bed-none this shift      A:  Neuro: AOx4  Cardiac: denies cardiac symptoms  Telemetry: vpaced  Respiratory: RA  GI/: voiding without difficulty  Endocrine: BG ACHS  Diet/Appetite: good appetite  Skin: no skin concerns  LDA: PIVx1; single lumen PICC  Activity: up independent in room  Pain: denies    P:   Plan: CHAMP tomorrow, NPO at midnight. Report changes or concerns to Cards 2 care team. Possible discharge tomorrow after CHAMP.

## 2020-11-19 NOTE — SEDATION DOCUMENTATION
Pt here for CHAMP. Procedure was explained to the pt and the consent was signed. Pt was given Fentanyl 100 mcg IV and Versed 2 mg IV for sedation. Pt tolerated the procedure without any adverse effects. Pt denies any pain or discomfort post procedure. Probe # 58 was used. Pt to remain NPO until 11:00am. Report called to 6C RN. Pt transported back to 6C on a stretcher and 2LNC.

## 2020-11-19 NOTE — TELEPHONE ENCOUNTER
ANTICOAGULATION  MANAGEMENT: Discharge Review    Eliseo Tanner chart reviewed for anticoagulation continuity of care    Hospital Admission on 11/14/2020 - 11/19/2020 for drive line infection.    Discharge disposition: Home    Results:    Recent labs: (last 7 days)     11/14/20 11/14/20  2041 11/15/20  0541 11/16/20  0616 11/17/20  0549 11/18/20  0540 11/19/20  0639   INR 8.2* 6.21* 6.13* 4.38* 3.40* 2.55* 2.05*     Anticoagulation inpatient management:     Warfarin was held inpatient.  INR was 8.2 on admission.  Vitamin K given PO on 11/16/2020.  1 mg of warfarin was given 11/18/2020    Anticoagulation discharge instructions:     Warfarin dosing: discharge instructions were to take warfarin 3 mg daily and recheck INR 11/23/2020   Bridging: No   INR goal change: No      Medication changes affecting anticoagulation: Yes: bactrim was stopped (he had started this on 11/9/2020 for drive line infection);  Continues on amiodarone;  Going home with PICC--will have cefazolin 2 g IV every 8 hours until 12/27/2020    Additional factors affecting anticoagulation: No    Plan     Agree with dosing adjustment on discharge    Spoke with Eliseo.  He will have his INR checked on 11/23/2020 at the [facility] in [place], [place].    Anticoagulation Calendar updated    Krysta Mcdaniel RN

## 2020-11-19 NOTE — PROGRESS NOTES
Care Management Discharge Note    Discharge Date: 11/19/20  Expected Time of Departure: 1630    Discharge Disposition: Home Infusion and outpatient lab/line cares    Discharge Services: Home infusion and outpatient lab/line cares    Discharge DME: (None)    Discharge Transportation: family or friend will provide      Education Provided on the Discharge Plan:  Yes  Persons Notified of Discharge Plans: Yes  Patient/Family in Agreement with the Plan: yes      Additional Information:  Pt status reviewed/discussed during care team rounds.  Concern noted with anticipated plan of patient going into his local hospital three times per day for his IV antibiotics.     Reviewed concerns with patient wife.  Discussed/reviewed home infusion options and out of pocket cost.  Pt was off the floor for CHAMP so writer unable to review with pt.   Pt wife ok with private pay cost of home infusion services.  Plan would be to have pt go into the local hospital for his labs and line care as they really do not want home RN services.  Pt wife has had previous home infusion education/training and not no concerns with her ability to learn how to administer the IV antibiotics.   Pt wife chose [facility] for home infusion service provider. Pt wife could be here around 1600 for education and to transport pt home.       Spoke with KRISTEN Li regarding pt ability to discharge today.  Per discussion with Jen she would be able to provide education for pt and his wife this afternoon.     Reviewed with 6C NUL and ANS.  Pt spouse and rep from [facility] will be allowed to come in for education this afternoon.       Reviewed with Dr. Meraz, Resident Cards 2.  Pt will need INR and labs and line care done on Monday 11/23.      Discharge orders reviewed and home infusion order updated.       Updated pt wife who reconfirmed she will be here at 1600.  Updated Jen that orders have been faxed to [facility].  [facility] will deliver supplies to [facility] and she will plan to do pt education at 1600 today.    Reviewed with KRISTEN Padilla.    Updated FALGUNI Blackman [phone], Fax [phone].  [facility].   Faxed discharge orders.    1230:  Met with pt.  Reviewed plan for discharge.  Pt confirmed he had an opportunity to connect with his wife as well.  Pt noted no concerns or questions.  Discharge IMM reviewed, signed, copy given to patient and copy placed in pt floor chart.     1245:  Spoke with Dr. Meraz Resident Cards 2 regarding concerns with administration of IV antibiotic.  Team is reviewing need for IV antibiotic to be infused via pump vs IV push.  Team agreed to update writer regarding preferred method of administration.  VM left for Jen Casas RN Liaison regarding possible need for pump.     1305:  Reviewed above concerns with Cintia LVAD VANITA.  Cintia has worked with pt and his wife for some time.  No concerns noted with adherence/compliance.     Received call from Nasrin Li regarding pump vs IV push.  Per discussion with Jen the IV push option is much easier for the patient and his wife to learn.  Administer via pump is more complicated to teach.   Jen noted concerns with pt not having home RN support should pump alarm or pt/spouse need assistance in troubleshooting.   Also should the pump fail given the pt is out of the [facility] area pt would be at increased risk to miss IV antibiotic doses.     Reviewed above with Dr. Meraz, Cards 2.  Plan is for pt to discharge with plan to administer antibiotic IV push.     Sarah Houser RN

## 2020-11-19 NOTE — PROGRESS NOTES
Brief non-visit ID note:   Patient discharged before I could see him.     65 y/o male with chronic systolic HF and NICM s/p HM 3 on 2/18/2020 c/b polymicrobial bacteremia (P mirabilis and E faecalis), ICD placement, ABHINAV on CPAP, type II DM, CKD stage III who presented with complicated MSSA bacteremia 2/2 LVAD drive line infection. See notes on 11/18 for more detailed note. CHAMP done today which did not reveal vegetations on valves or ICD. Numerous antibiotics options were discussed with the patient including cefazolin 2 grams IV q 8 hours, cefazolin 6 grams IV as a continuous infusion over 24 hours and ceftriaxone 2 grams IV q 24 hours. Ultimately the patient decided he wanted to do home infusion so opted for cefazolin 2 grams IV q 8 hours at home. Patient and wife felt like this was a regimen that they could handle at home.       -Cefazolin 2 grams IV q 8 hours  -will need weekly CBC w/ diff and BMP while on cefazolin  -will arrange close follow up in the ID clinic   -will coordinate oral suppression closer to the end of therapy    Negar Bhatti DO. Pager [phone]

## 2020-11-19 NOTE — PLAN OF CARE
D: admitted 11/14 with drive line infection (MSSA).   Hx: NICM (LVEF 15-20%) s/p HM3 LVAD (2/18/20) c/b MSSA driveline infection (11/5/20), moderate nonobstructive CAD, severe MR,  ABHINAV on CPAP, DM2, CKD III, HLD, ANA, and prior tobacco use.    I: Monitored vitals and assessed pt status.   Changed:   - NPO since MN   - pt reported pain at PICC insertion site, under BioPatch. Skin appeared intact. Pt expereinces skin irritation with CHG. Pt given ice pack, after which he reported his pain was gone. Continue to monitor site and assess need for alternative to BioPatch  Running: TKO for intermittent abx  PRN: robaxin x1    A: A0x4. VSS on RA/CPAP at HS. Tele shows V-paced rhythm, rate 100-110s. LVAD without alarms overnight but with low PIs (1.5-3.2 - Cards crosscover notified). Dressing CDI. Afebrile. Urinating adequately. Pt denied pain, SOB, lightheadedness. Up independently. BG stable overnight. Slept between cares.     P: CHAMP at 8:30 and possible discharge after. Continue to monitor Pt status and report changes to Cards 2. [id number]  Antonette Rebolledo RN on 11/19/2020 at 7:54 AM

## 2020-11-19 NOTE — PROGRESS NOTES
[facility]  Transplant Infectious Disease Progress Note     Patient:  Eliseo Tanner, Date of birth 1955, Medical record number [id number]  Date of Visit:  11/18/2020         Assessment and Recommendations:   Assessment:  63 y/o male with chronic systolic HF and NICM s/p HM 3 on 2/18/2020 c/b polymicrobial bacteremia (P mirabilis and E faecalis), ICD placement, ABHINAV on CPAP, type II DM, CKD stage III who presented with complicated MSSA bacteremia 2/2 drive line infection.      1. Complicated MSSA bacteremia w/ associated MSSA LVAD drive line infection: developed pain around exit site followed by drainage~ 11/1. Initially started on doxycycline 11/5 but switched to tmp/smx when the culture revealed MSSA resistant to doxy-subsequently switched to tmp/smx 1 S.S. BID. Blood cultures were initially negative but 11/12 and 11/14 were positive for MSSA.  CT A/P was negative for a deeper drive line infection. TTE was poor quality and the valves were not well visualized. I am concerned that he could have been bacteremic for a prolonged period prior to presentation potentially seeding ICD leads. Spoke with cardiology attending, Dr. Cisneros, and expressed my concerns about the potential for ICD infection in the setting of bacteremia. If the ICD was involved we would have to consider ICD removal. I greatly appreciate the cardiology team performing CHAMP which will be done tomorrow. In terms of his antibiotic plan I was told today that he does not have home coverage for infusions. Going to the hospital 3 times a day for cefazolin injections is not realistic for a 6 week course. This would be a difficult regimen to adhere too. I discussed with case management 2 alternatives: cefazolin 6 gram IV continuous infusion over 24 hours changed daily at infusion center (preferred regimen) or ceftriaxone 2 grams IV daily (second choice).  The data on ceftriaxone in the setting of MSSA bacteremia is  controversial and mixed. A recent retrospective study that reviewed outcomes in patients in [facility] who were treated with ceftriaxone for MSSA bacteremia found that outcomes were similar to those who received cefazolin (Jefferson et al. Outcomes of Outpatient Parenteral Antimicrobial Therapy With Ceftriaxone for Methicillin-Susceptible Staphylococcus aureus Bloodstream Infections--A Single-Center Observational Study. OFID. 2020). Given the restriction of his home benefits ceftriaxone will need to be an alternative regimen considered.  After he completes a 6 week course I will transition him to suppressive antibiotics as an outpatient.        2. Diarrhea: overall improved. C diff and enteric pathogen panel negative.      Historical Infectious Diseases Issues:  2/2020 P mirabilis and E faecalis bacteremia post VAD placement     Recommendations:  1. Continue cefazolin 2 grams IV q 8 hours for now   2. Going to the hospital for q 8 hour injections is not a good plan and there is great risk for non-adherance over a 6 week period. Alternative options: cefazolin 6 grams q 24 hours as a continuous infusion-changed at infusion center daily (preferred) or ceftriaxone 2 grams IV q 24 hours (2nd choice). I spoke w/ case management regarding these options and they will look into them.   3. CHAMP will be performed tomorrow-will follow up the results  4. Anticipated duration of 6 weeks from 11/15-12/27/20. Will need suppression after (ID will take care of this plan at the end of the 6 weeks-options include cephalexin) .      Transplant Infectious Disease will continue to follow with you.      Negar Bhatti DO. Pager [phone]        Interval History:     Afebrile. Discussed antibiotic plan with case management and cardiology. Planning for CHAMP tomorrow. Blood culture remain negative.     Transplants: Coordinator Kay Padilla    Review of Systems:Remaining systems all reviewed and negative.    Antibiotic History  Cefazolin 11/14-C          Current Medications & Allergies:       allopurinol  200 mg Oral or Feeding Tube Daily     amiodarone  200 mg Oral Daily     aspirin  81 mg Oral Daily     bumetanide  2 mg Oral BID     ceFAZolin  2 g Intravenous Q8H     finasteride  5 mg Oral Daily     FLUoxetine  20 mg Oral Daily     hydrALAZINE  100 mg Oral TID     insulin aspart  1-7 Units Subcutaneous TID AC     insulin aspart  1-5 Units Subcutaneous At Bedtime     insulin glargine  10 Units Subcutaneous At Bedtime     lisinopril  10 mg Oral Daily     magnesium oxide  400 mg Oral or Feeding Tube Daily     multivitamin w/minerals  1 tablet Oral Daily     polyethylene glycol  17 g Oral Daily     senna-docusate  1 tablet Oral BID    Or     senna-docusate  2 tablet Oral BID     sodium chloride (PF)  3 mL Intracatheter Q8H     tamsulosin  0.4 mg Oral Daily     warfarin ANTICOAGULANT  1 mg Oral ONCE at 18:00       Infusions/Drips:    - MEDICATION INSTRUCTIONS -       - MEDICATION INSTRUCTIONS -       Warfarin Therapy Reminder         Allergies   Allergen Reactions     Heparin      HIT screen positive 2/14/20, reflex DAVINA negative; however heme recommended treating as if positive  HIT screen negative 2/11/20     Oxycodone Itching and Other (See Comments)     Chlorhexidine Rash            Physical Exam:   Vitals were reviewed.  All vitals stable.  Patient Vitals for the past 24 hrs:   BP Temp Temp src Pulse Resp SpO2 Weight   11/18/20 1510 126/72 99.1  F (37.3  C) Oral 118 18 94 % --   11/18/20 1438 126/72 -- -- -- -- -- --   11/18/20 0820 (!) 89/66 -- -- 121 -- -- --   11/18/20 0747 91/60 98.1  F (36.7  C) Oral 121 18 96 % --   11/18/20 0555 -- -- -- -- -- -- 95.3 kg (210 lb 1.6 oz)   11/18/20 0315 98/72 97.5  F (36.4  C) Axillary 120 16 97 % --   11/17/20 2320 102/66 99.4  F (37.4  C) Oral 118 18 95 % --   11/17/20 1924 (!) 86/73 98.8  F (37.1  C) Oral 118 18 96 % --     Ranges for vital signs:  Temp:  [97.5  F (36.4  C)-99.4  F (37.4  C)] 99.1  F (37.3  C)  Pulse:   [118-121] 118  Resp:  [16-18] 18  BP: ()/(60-73) 126/72  SpO2:  [94 %-97 %] 94 %  Vitals:    11/16/20 0657 11/17/20 0400 11/18/20 0555   Weight: 98 kg (216 lb) 97.3 kg (214 lb 9.6 oz) 95.3 kg (210 lb 1.6 oz)       Physical Examination:  GENERAL:  well-appearing. in bed; in no acute distress.  EYES:  Eyes have anicteric sclerae without conjunctival injection.   CV: LVAD in place.    LUNGS: No accessory muscle use. Not on oxygen.   EXTREMITIES: No joint erythema or swelling.  No back pain in palpation.   NEUROLOGIC:  Grossly nonfocal. Active x4 extremities  LINES: PICC line in right arm         Laboratory Data:     Metabolic Studies       Recent Labs   Lab Test 11/18/20  1602 11/18/20  0540 11/17/20  0549 11/15/20  0541 11/15/20  0541 09/21/20  1440 09/21/20  1440 03/06/20  1615 03/06/20  1615 03/06/20  0322 03/06/20  0322 02/13/20  0206 02/13/20  0206 02/08/20  0408 02/08/20  0408   NA  --  135 135   < > 133   < >  --    < > 137  --  135   < > 132*   < > 134   POTASSIUM 3.8 3.3* 3.5   < > 4.4   < >  --    < > 4.1  --  4.1   < > 4.1  4.2   < > 3.5   CHLORIDE  --  98 101   < > 102   < >  --    < >  --   --  104   < > 96   < > 103   CO2  --  30 27   < > 21   < >  --    < >  --   --  30   < > 22   < > 23   ANIONGAP  --  7 7   < > 10   < >  --    < >  --   --  1*   < > 14   < > 8   BUN  --  42* 41*   < > 31*   < >  --    < >  --   --  16   < > 34*   < > 50*   CR  --  1.68* 1.77*   < > 1.78*   < >  --    < >  --   --  1.02   < > 1.58*   < > 2.81*   GFRESTIMATED  --  41* 39*   < > 39*   < >  --    < >  --   --  76   < > 45*   < > 22*   GLC  --  138* 141*   < > 143*   < >  --    < > 123*  --  149*   < > 154*   < > 155*   A1C  --   --   --   --   --   --   --   --   --   --   --   --   --   --  7.3*   CALOS  --  8.0* 7.9*   < > 8.5   < >  --    < >  --   --  8.0*   < > 8.9   < > 8.2*   PHOS  --   --   --   --   --   --   --   --   --   --  3.8   < > 5.4*   < > 5.0*   MAG  --   --   --   --  2.0  --   --    < >  --   --   2.2   < > 2.0   < >  --    LACT  --   --   --   --   --   --  1.3  --  0.7   < >  --    < > 9.5*   < > 0.6*   CKT  --   --   --   --   --   --   --   --   --   --   --   --  39  --   --     < > = values in this interval not displayed.       Hepatic Studies    Recent Labs   Lab Test 11/15/20  0830 11/14/20  2041 11/12/20 10/16/20   BILITOTAL  --  0.7 0.5 0.6   DBIL  --  0.4*  --  0.0   ALKPHOS  --  184* 202.0* 139   PROTTOTAL  --  7.8 7.6 8.4   ALBUMIN  --  2.6* 3.7 4.3   AST  --  48* 65.0* 70   ALT  --  56 64* 88     --   --  322       Hematology Studies      Recent Labs   Lab Test 11/18/20  0540 11/17/20  0549 11/16/20 0616 11/15/20  0830 11/14/20  2041 11/05/20 09/21/20  1438   WBC 16.2* 17.1* 15.7* 19.9* 18.4* 9.4 7.2   ANEU  --   --   --  17.4*  --   --  5.3   ALYM  --   --   --  0.8  --   --  1.1   GILUIANO  --   --   --  1.2  --   --  0.7   AEOS  --   --   --  0.0  --   --  0.1   HGB 9.5* 9.6* 9.7* 10.0* 10.5* 10.1* 10.9*   HCT 32.3* 32.4* 32.7* 33.6* 35.7* 33.7 36.8*   * 472* 464* 418 430 385 315     Arterial Blood Gas Testing    Recent Labs   Lab Test 03/15/20  2235 03/06/20  1615 03/06/20  1550 02/26/20  1321 02/24/20  0345 02/24/20  0345 02/23/20  1955   PH 7.53* 7.41 7.39  --   --  7.46* 7.47*   PCO2 35 42 45  --   --  42 37   PO2 126* 96 49*  --   --  123* 81   HCO3 29* 26 27  --   --  30* 27   O2PER 21 65 65 21.0   < > 2L  2L 21    < > = values in this interval not displayed.        Medication levels    Recent Labs   Lab Test 02/16/20  0400 02/13/20  2147 02/13/20  2147   VANCOMYCIN 18.5   < >  --    TOBRA  --   --  13.0    < > = values in this interval not displayed.       Inflammatory Markers    Recent Labs   Lab Test 11/05/20 09/21/20  1438 03/04/20  0757 02/08/20  0408   CRP 54.9 6.6 77.0* 21.0*     Pancreatitis testing    Recent Labs   Lab Test 02/08/20  0408   TRIG 57       Gout Labs      Recent Labs   Lab Test 02/08/20  0408   URIC 7.5*       Clotting Studies    Recent Labs   Lab Test  11/18/20  0540 11/17/20  0549 11/16/20  0616 11/15/20  0541 03/20/20  0530 03/20/20  0530   INR 2.55* 3.40* 4.38* 6.13*   < > 2.84*   PTT  --   --   --   --   --  59*    < > = values in this interval not displayed.       Iron Testing    Recent Labs   Lab Test 11/18/20  0540 11/16/20  0616 11/16/20  0616 02/08/20  0408 02/08/20  0408   IRON  --   --  16*  --  25*   FEB  --   --  261  --  306   IRONSAT  --   --  6*  --  8*   HEAVENLY  --   --  75  --  189   MCV 67*   < > 67*   < > 87   B12  --   --   --   --  752    < > = values in this interval not displayed.       Thyroid Studies     Recent Labs   Lab Test 02/08/20  0408   TSH 1.72       Body fluid stats    Recent Labs   Lab Test 11/16/20  1140   GS No organisms seen  Rare  WBC'S seen         Microbiology:  Last Culture results with specimen source  Culture Micro   Date Value Ref Range Status   11/17/2020 No growth after 1 day  Preliminary   11/17/2020 No growth after 1 day  Preliminary   11/16/2020 No growth after 2 days  Preliminary   11/16/2020 No growth after 2 days  Preliminary   11/16/2020 Moderate growth  Staphylococcus aureus   (A)  Preliminary   11/16/2020 Culture in progress  Preliminary   11/15/2020 No growth after 3 days  Preliminary   11/15/2020 No growth after 3 days  Preliminary   11/14/2020 (A)  Final    Cultured on the 1st day of incubation:  Staphylococcus aureus     11/14/2020   Final    Critical Value/Significant Value, preliminary result only, called to and read back by  ROSA ACE, RN 1553 11.15.20 ND     11/14/2020   Final    (Note)  POSITIVE for STAPHYLOCOCCUS AUREUS and NEGATIVE for the mecA gene  (not MRSA) by [employer] multiplex nucleic acid test. The mecA gene was  not detected. Final identification and antimicrobial susceptibility  testing will be verified by standard methods.    Specimen tested with Verigene multiplex, gram-positive blood culture  nucleic acid test for the following targets: Staph aureus, Staph  epidermidis, Staph  lugdunensis, other Staph species, Enterococcus  faecalis, Enterococcus faecium, Streptococcus species, S. agalactiae,  S. anginosus grp., S. pneumoniae, S. pyogenes, Listeria sp., mecA  (methicillin resistance) and Ernst/B (vancomycin resistance).    Critical Value/Significant Value called to and read back by Sha Fontenot, Rn 1835 11.15.20 NDP     11/14/2020 (A)  Final    Cultured on the 1st day of incubation:  Staphylococcus aureus  Susceptibility testing done on previous specimen     11/14/2020   Final    Critical Value/Significant Value, preliminary result only, called to and read back by  PATRICIO SHANNON, RN 2101 11.15.20 NDP     09/21/2020 No growth  Final   09/21/2020 No growth  Final   03/13/2020 No growth  Final   03/13/2020 No growth  Final    Specimen Description   Date Value Ref Range Status   11/17/2020 Blood Right Arm  Final   11/17/2020 Blood Left Arm  Final   11/16/2020 Blood Left Hand  Final   11/16/2020 Blood Right Arm  Final   11/16/2020 Wound Drainage  Final   11/16/2020 Wound Drainage  Final   11/15/2020 Blood Right Hand  Final   11/15/2020 Blood Left Arm  Final   11/15/2020 Feces  Final   11/14/2020 Blood Left Hand  Final   11/14/2020 Blood Left Arm  Final   09/21/2020 Blood Left Arm  Final   09/21/2020 Blood Right Arm  Final   03/13/2020 Blood Left Hand  Final   03/13/2020 Blood Right Hand  Final   03/03/2020 Blood Left Hand  Final        Last check of C difficile  C Diff Toxin B PCR   Date Value Ref Range Status   11/15/2020 Negative NEG^Negative Final     Comment:     Negative: C. difficile target DNA sequences NOT detected, presumed negative   for C.difficile toxin B or the number of bacteria present may be below the   limit of detection for the test.  FDA approved assay performed using [employer] GeneXpert real-time PCR.  A negative result does not exclude actual disease due to C. difficile and may   be due to improper collection, handling and storage of the specimen or the   number of  organisms in the specimen is below the detection limit of the assay.       Virology:  Respiratory virus testing    Recent Labs   Lab Test 11/14/20  2140 02/13/20 0334   INFZA  --  Negative   INFZB  --  Negative   IRSV  --  Negative   [id number] Nasopharyngeal  --    SARSCOVRES NEGATIVE  --      Urine Studies     Recent Labs   Lab Test 11/17/20  0825 02/22/20  0944 02/13/20  0334 02/07/20 2029   URINEPH 5.5 5.5 6.0 5.0   NITRITE Negative Negative Negative Negative   LEUKEST Negative Small* Small* Negative   WBCU 0 2 81* 3     Imaging:  Recent Results (from the past 48 hour(s))   US Renal Complete    Narrative    EXAMINATION: US RENAL COMPLETE, 11/17/2020 1:43 PM     COMPARISON: None.    HISTORY: Rule out obstruction    TECHNIQUE: The kidneys and bladder were scanned in the standard  fashion with specialized ultrasound transducer(s) using both gray  scale and limited color/spectral Doppler techniques.    FINDINGS:    Right kidney: Measures 10.5 cm in length. Parenchyma is of normal  thickness and echogenicity. No focal mass. No hydronephrosis.    Left kidney: Measures 11.6 cm in length. Parenchyma is of normal  thickness and echogenicity. No focal mass. No hydronephrosis.     Bladder: Decompressed with Guevara catheter.      Impression    IMPRESSION:  1.  Normal renal ultrasound study.    OSITO BRAVO MD   XR Chest Port 1 View    Narrative    Exam: XR CHEST PORT 1 VW, 11/18/2020 2:19 PM    Indication: PICC Placement    Comparison: 9/21/2020    Findings:   Right upper extremity PICC tip at the mid to low SVC. Left chest wall  single lead automatic implantable cardiac defibrillator. Stable LVAD.  Stable enlarged cardiac silhouette. The pulmonary vasculature is  within normal limits. No pleural effusion or pneumothorax. No focal  airspace opacity. Low lung volumes.      Impression    Impression: Right upper extremity PICC tip at the mid to low SVC.    CHARLES HERNANDEZ, DO

## 2020-11-20 ENCOUNTER — CARE COORDINATION (OUTPATIENT)
Dept: CARDIOLOGY | Facility: CLINIC | Age: 65
End: 2020-11-20

## 2020-11-20 NOTE — PROGRESS NOTES
Called patient/caregiver to check in 1 day post discharge. Pt reports VAD parameters stable and weight unchanged. Reviewed medications and answered any questions.     Discussed specific new problems/stressors since being discharged from the hospital.  Discussed plan of care post discharge.   Pt to see primary in 1 week. Labs done once a week with infusions.  Follow up scheduled with Dr. Cisneros and Dr. Bhatti.  Pt reporting minimal drainage from driveline exit site. Encouraged pt to page VAD Coordinator with any issues or questions. Pt verbalizes understanding.

## 2020-11-20 NOTE — PHARMACY
[facility], [facility]  Parenteral ANtibiotic Review at Departure from Acute Care [facility] Note     Antimicrobial Stewardship Program - A joint venture between [facility] and  Physicians to optimize antibiotic management.  NOT a formal consult - Restricted Antimicrobial Review     Patient: Eliseo Tanner  MRN: [id number]  Allergies: Heparin, Oxycodone, and Chlorhexidine    Brief Summary: Eliseo Tanner is a 65 year old with PMHx significant for HFrEF and NICM s/p HM 3 (2/18/2020) c/b polymicrobial bacteremia (Proteus mirabilis and Enterococcus faecalis), ICD placement, ABHINAV (on CPAP), T2DM, and CKD III who was admitted on 11/14/2020 with complicated MSSA bacteremia due to drive line infection. He initially developed pain around exit site followed by drainage ~11/1, started on doxycycline followed by Bactrim after culture showed MSSA resistant doxycycline. 11/12 and 11/14 blood cultures positive for MSSA. CT a/p was negative for a deeper drive line infection. 11/19 CHAMP negative for vegetations. Plan for patient to complete an extended course of IV antibiotic therapy as outpatient.     Antimicrobial Dose/Route/Frequency Duration/Indication Start Date End Date   Cefazolin 2 g/IV/every 8 hours 6 weeks/  bacteremia(drive line site infection) 11/15/2020 Tentatively  12/27/2020     Laboratory Tests: CBC with Diff, BMP   Lab Monitoring Frequency: 1x week   Therapeutic Drug Monitoring: none    Therapeutic Drug Monitoring Frequency: none     Miscellaneous Drug Monitoring: none      Line Type: PICC(Single lumen, placed 11/18/2020)    Reassess Line/Pull Line Date: TBD per ID provider     First Dose Received in Controlled Setting: Yes    Designated Provider: Negar Bhatti,     Follow-up: Patient does  have ID follow-up. [facility] schedulers working with Dr. Bhatti and Eliseo to arrange visit.     Recommendations/Additional Information:   1.) Please fax  laboratory results to [facility] ([phone]), attn: Dr. Bhatti  2.) ID will determine chronic oral suppression plan as outpatient    Sarah Jimenez, PatelD, BCIDP  Pager: [phone]    Vital Signs/Clinical Features:  Vitals         11/18 0700  -  11/19 0659 11/19 0700  -  11/20 0659 11/20 0700  -  11/20 0719   Most Recent    Temp ( F) 97.7 -  99.1    97.5 -  98.7       98.7 (37.1)    Pulse 117 -  121    99 -  120       117    Resp   18    9 - 20       18    BP 74/63 -  126/72    69/39 -  109/83       75/67    SpO2 (%) 94 -  99    95 -  99       95            Labs  Estimated Creatinine Clearance: 50.6 mL/min (A) (based on SCr of 1.56 mg/dL (H)).  Recent Labs   Lab Test 11/14/20  2041 11/15/20  0541 11/16/20  0616 11/17/20  0549 11/18/20  0540 11/19/20  0639   CR 1.98* 1.78* 1.66* 1.77* 1.68* 1.56*       Recent Labs   Lab Test 02/13/20  1030 02/13/20  1030 02/19/20  0456 02/19/20  0456 02/19/20  1009 02/19/20  1009 03/23/20  1146 03/23/20  1146 09/21/20  1438 09/21/20  1438 11/14/20  2041 11/15/20  0830 11/16/20  0616 11/17/20  0549 11/18/20  0540 11/19/20  0639   WBC 15.9*   < > 11.5*   < > 8.3   < > 9.8   < > 7.2   < > 18.4* 19.9* 15.7* 17.1* 16.2* 13.7*   ANEU 14.8*  --  9.8*  --  6.9  --  6.1  --  5.3  --   --  17.4*  --   --   --   --    ALYM 0.4*  --  1.1  --  0.8  --  1.6  --  1.1  --   --  0.8  --   --   --   --    GIULIANO 0.4  --  0.4  --  0.4  --  0.9  --  0.7  --   --  1.2  --   --   --   --    AEOS 0.0  --  0.1  --  0.0  --  1.0*  --  0.1  --   --  0.0  --   --   --   --    HGB 14.5   < > 7.6*   < > 10.0*   < > 9.2*   < > 10.9*   < > 10.5* 10.0* 9.7* 9.6* 9.5* 9.5*   HCT 44.5   < > 22.4*   < > 29.9*   < > 32.3*   < > 36.8*   < > 35.7* 33.6* 32.7* 32.4* 32.3* 31.9*   MCV 87   < > 87   < > 89   < > 91   < > 73*   < > 69* 68* 67* 69* 67* 68*   PLT 88*   < > 122*   < > 102*   < > 514*   < > 315   < > 430 418 464* 472* 530* 492*    < > = values in this interval not displayed.       Recent Labs   Lab Test  07/13/20 09/21/20  1438 09/24/20 10/16/20 11/12/20 11/14/20  2041   BILITOTAL 0.4 0.6 0.4 0.6 0.5 0.7   ALKPHOS 103 117 96.0 139 202.0* 184*   ALBUMIN 4.2 3.8 3.9 4.3 3.7 2.6*   AST 47 136* 89.0 70 65.0* 48*   ALT 52 118* 80 88 64* 56       Recent Labs   Lab Test 02/08/20  0408 02/08/20  0408 02/19/20  1610 02/19/20  1941 02/24/20  0820 03/04/20  0757 03/06/20  1550 03/06/20  1615 09/21/20  1438 09/21/20  1440 11/05/20   LACT 0.6*   < > 1.1 0.8 0.7  --  0.9 0.7  --  1.3  --    CRP 21.0*  --   --   --   --  77.0*  --   --  6.6  --  54.9    < > = values in this interval not displayed.       Recent Labs   Lab Test 02/13/20 2147 02/13/20 2147 02/16/20 0400   VANCOMYCIN  --    < > 18.5   TOBRA 13.0  --   --     < > = values in this interval not displayed.       Culture Results:  7-Day Micro Results       Procedure Component Value Units Date/Time    Blood culture [[id number]] Collected: 11/17/20 0548    Order Status: Completed Lab Status: Preliminary result Updated: 11/20/20 0321    Specimen: Blood      Specimen Description Blood Right Arm     Culture Micro No growth after 3 days    Blood culture [[id number]] Collected: 11/17/20 0548    Order Status: Completed Lab Status: Preliminary result Updated: 11/20/20 0321    Specimen: Blood      Specimen Description Blood Left Arm     Culture Micro No growth after 3 days    Blood culture [[id number]] Collected: 11/16/20 1748    Order Status: Completed Lab Status: Preliminary result Updated: 11/20/20 0321    Specimen: Blood      Specimen Description Blood Left Hand     Culture Micro No growth after 4 days    Blood culture [[id number]] Collected: 11/16/20 1742    Order Status: Completed Lab Status: Preliminary result Updated: 11/20/20 0321    Specimen: Blood      Specimen Description Blood Right Arm     Culture Micro No growth after 4 days    Blood culture     Order Status: Canceled Lab Status: No result     Specimen: Blood     Blood culture     Order Status: Canceled Lab Status: No result      Specimen: Blood     Gram stain [[id number]] Collected: 11/16/20 1140    Order Status: Completed Lab Status: Final result Updated: 11/16/20 1644    Specimen: Wound      Specimen Description Wound Drainage     Special Requests Specimen collected in eSwab transport (white cap)     Gram Stain No organisms seen      Rare  WBC'S seen      Wound Culture Aerobic Bacterial [[id number]]  (Abnormal)  (Susceptibility) Collected: 11/16/20 1140    Order Status: Completed Lab Status: Final result Updated: 11/19/20 2326    Specimen: Wound from Fluid, Drainage      Specimen Description Wound Drainage     Special Requests Specimen collected in eSwab transport (white cap)     Culture Micro Moderate growth  Staphylococcus aureus        Moderate growth  Strain 2  Staphylococcus aureus      Susceptibility       Staphylococcus aureus (2)       Antibiotic Interpretation Sensitivity Method Status    CIPROFLOXACIN [*]  Sensitive <=0.5 ug/mL DONTA Final    CLINDAMYCIN Sensitive <=0.25 ug/mL DONTA Final    ERYTHROMYCIN Sensitive <=0.25 ug/mL DONTA Final    GENTAMICIN Sensitive <=0.5 ug/mL DONTA Final    LEVOFLOXACIN [*]  Sensitive 0.25 ug/mL DONTA Final    OXACILLIN Sensitive 0.5 ug/mL DONTA Final    TETRACYCLINE Resistant >=16 ug/mL DONTA Final    Trimethoprim/Sulfa Sensitive <=0.5/9.5 ug/mL DONTA Final    VANCOMYCIN Sensitive 1 ug/mL DONTA Final    RIFAMPIN [*]  Sensitive <=0.5 ug/mL DONTA Final    TIGECYCLINE [*]  Sensitive <=0.12 ug/mL DONTA Final    LINEZOLID [*]  Sensitive 2 ug/mL DONTA Final    Quinupristin/Dalfopr [*]  Sensitive <=0.25 ug/ml DONTA Final    MOXIFLOXACIN [*]  Sensitive <=0.25 ug/mL DONTA Final    Cefoxitin Screen [*]  Sensitive Negative  DONTA Final              Staphylococcus aureus (1)       Antibiotic Interpretation Sensitivity Method Status    CIPROFLOXACIN [*]  Sensitive <=0.5 ug/mL DONTA Final    CLINDAMYCIN Sensitive <=0.25 ug/mL DONTA Final    ERYTHROMYCIN Sensitive <=0.25 ug/mL DONTA Final    GENTAMICIN Sensitive <=0.5 ug/mL DONTA Final    LEVOFLOXACIN [*]   Sensitive 0.25 ug/mL DONTA Final    OXACILLIN Sensitive 0.5 ug/mL DONTA Final    TETRACYCLINE Resistant >=16 ug/mL DONTA Final    Trimethoprim/Sulfa Sensitive <=0.5/9.5 ug/mL DONTA Final    VANCOMYCIN Sensitive 1 ug/mL DONTA Final    RIFAMPIN [*]  Sensitive <=0.5 ug/mL DONTA Final    TIGECYCLINE [*]  Sensitive <=0.12 ug/mL DONTA Final    LINEZOLID [*]  Sensitive 2 ug/mL DONTA Final    Quinupristin/Dalfopr [*]  Sensitive <=0.25 ug/ml DONTA Final    MOXIFLOXACIN [*]  Sensitive <=0.25 ug/mL DONTA Final    Cefoxitin Screen [*]  Sensitive Negative  DONTA Final               [*]   Suppressed Antibiotic                   Blood culture [[id number]] Collected: 11/15/20 2001    Order Status: Canceled Lab Status: No result     Specimen: Blood     Blood culture [[id number]] Collected: 11/15/20 2001    Order Status: Canceled Lab Status: No result     Specimen: Blood     Blood culture [[id number]] Collected: 11/15/20 1958    Order Status: Completed Lab Status: Preliminary result Updated: 11/20/20 0320    Specimen: Blood      Specimen Description Blood Right Hand     Culture Micro No growth after 5 days    Blood culture [[id number]] Collected: 11/15/20 1951    Order Status: Completed Lab Status: Preliminary result Updated: 11/20/20 0320    Specimen: Blood      Specimen Description Blood Left Arm     Culture Micro No growth after 5 days    Clostridium difficile toxin B PCR [[id number]] Collected: 11/15/20 1445    Order Status: Completed Lab Status: Final result Updated: 11/15/20 1727    Specimen: Feces      Specimen Description Feces     C Diff Toxin B PCR Negative     Comment: Negative: C. difficile target DNA sequences NOT detected, presumed negative   for C.difficile toxin B or the number of bacteria present may be below the   limit of detection for the test.  FDA approved assay performed using Granular GeneXpert real-time PCR.  A negative result does not exclude actual disease due to C. difficile and may   be due to improper collection, handling and storage of the  specimen or the   number of organisms in the specimen is below the detection limit of the assay.         Blood culture     Order Status: Canceled Lab Status: No result     Specimen: Blood     Blood culture     Order Status: Canceled Lab Status: No result     Specimen: Blood     Blood culture [[id number]]  (Abnormal)  (Susceptibility) Collected: 11/14/20 2042    Order Status: Completed Lab Status: Edited Result - FINAL Updated: 11/19/20 1051    Specimen: Blood      Specimen Description Blood Left Hand     Culture Micro Cultured on the 1st day of incubation:  Staphylococcus aureus        Critical Value/Significant Value, preliminary result only, called to and read back by  SHA ACE RN [id number] 11.15.20 NDP        (Note)  POSITIVE for STAPHYLOCOCCUS AUREUS and NEGATIVE for the mecA gene  (not MRSA) by [employer] nucleic acid test. The mecA gene was  not detected. Final identification and antimicrobial susceptibility  testing will be verified by standard methods.    Specimen tested with hearo.fmigene multiplex, gram-positive blood culture  nucleic acid test for the following targets: Staph aureus, Staph  epidermidis, Staph lugdunensis, other Staph species, Enterococcus  faecalis, Enterococcus faecium, Streptococcus species, S. agalactiae,  S. anginosus grp., S. pneumoniae, S. pyogenes, Listeria sp., mecA  (methicillin resistance) and Ernst/B (vancomycin resistance).    Critical Value/Significant Value called to and read back by Sha Ace Rn [id number] 11.15.20 NDP      Susceptibility       Staphylococcus aureus (1)       Antibiotic Interpretation Sensitivity Method Status    CIPROFLOXACIN [*]  Sensitive <=0.5 ug/mL DONTA Final    CLINDAMYCIN Sensitive <=0.25 ug/mL DONTA Final    ERYTHROMYCIN Sensitive <=0.25 ug/mL DONTA Final    GENTAMICIN Sensitive <=0.5 ug/mL DONTA Final    LEVOFLOXACIN [*]  Sensitive 0.25 ug/mL DONTA Final    OXACILLIN Sensitive 0.5 ug/mL DONTA Final    TETRACYCLINE Resistant >=16 ug/mL DONTA Final     Trimethoprim/Sulfa Sensitive <=0.5/9.5 ug/mL DONTA Final    VANCOMYCIN Sensitive 1 ug/mL DONTA Final    RIFAMPIN [*]  Sensitive <=0.5 ug/mL DONTA Final    TIGECYCLINE [*]  Sensitive <=0.12 ug/mL DONTA Final    LINEZOLID [*]  Sensitive 2 ug/mL DONTA Final    Quinupristin/Dalfopr [*]  Sensitive <=0.25 ug/ml DONTA Final    MOXIFLOXACIN [*]  Sensitive <=0.25 ug/mL DONTA Final    Cefoxitin Screen [*]  Sensitive Negative  DONTA Final               [*]   Suppressed Antibiotic                   Blood culture [[id number]]  (Abnormal) Collected: 11/14/20 2040    Order Status: Completed Lab Status: Final result Updated: 11/17/20 0721    Specimen: Blood      Specimen Description Blood Left Arm     Culture Micro Cultured on the 1st day of incubation:  Staphylococcus aureus  Susceptibility testing done on previous specimen        Critical Value/Significant Value, preliminary result only, called to and read back by  PATRICIO SHANNON RN [id number] 11.15.20 NDP      Wound Culture Aerobic Bacterial     Order Status: Canceled Lab Status: No result     Specimen: Wound     Clostridium difficile toxin B PCR     Order Status: No result Lab Status: No result     Specimen: Stool     Enteric Bacteria and Virus Panel by [employer] Stool     Order Status: No result Lab Status: No result     Specimen: Stool             Recent Labs   Lab Test 02/07/20 2029 02/13/20  0334 02/22/20  0944 11/17/20  0825   URINEPH 5.0 6.0 5.5 5.5   NITRITE Negative Negative Negative Negative   LEUKEST Negative Small* Small* Negative   WBCU 3 81* 2 0                   Recent Labs   Lab Test 11/15/20  1445   CDBPCT Negative       Imaging: Us Abdomen Complete    Result Date: 11/15/2020  EXAMINATION: US ABDOMEN COMPLETE, 11/15/2020 9:20 AM COMPARISON: 2/8/2020 HISTORY: Concern for abscess, patient unable to get CT scan due to elevated creatinine TECHNIQUE: The abdomen was scanned in standard fashion with specialized ultrasound transducer(s) using both gray-scale and limited color Doppler techniques.  FINDINGS: Liver: The liver demonstrates normal homogeneous echotexture. The liver measures 19.7 cm in craniocaudal dimension. No evidence of a focal hepatic mass. The main portal vein is patent with antegrade flow. Gallbladder: There is no wall thickening, positive sonographic Yoon's sign or evidence for cholelithiasis. Bile Ducts: Both the intra- and extrahepatic biliary system are of normal caliber. The common bile duct measures 3.7 mm in diameter. Pancreas: Not well visualized. Kidneys: Both kidneys are of normal echotexture, without mass or hydronephrosis. The craniocaudal dimensions are: right- 10.5, left- 11.5. Spleen: The spleen is normal in size, measuring 11.1 in sagittal dimension. Aorta and IVC: The visualized portions of the aorta and IVC are unremarkable. The proximal aorta measures 2.7 cm in diameter and the IVC measures 2.4 cm in diameter. Fluid: Small volume of ascites. Partially imaged right pleural effusion.     IMPRESSION: 1. Hepatomegaly with small volume ascites. No intra-abdominal abscess visualized. If there is continued clinical concern for abscess, consider noncontrast CT scan of the abdomen/pelvis. 2. Partially imaged right pleural effusion, likely trace. I have personally reviewed the examination and initial interpretation and I agree with the findings. DEION FLANAGAN MD    Echo Complete    Result Date: 2020  [id number]^PHYLLIS NAIR^CONNIE^YODIT    [facility],[place] [facility] [address] [place] [id number]  Name: WOLFGANG LEACH MRN: [id number] : 1955 Study Date: 2020 10:56 AM Age: 65 yrs Gender: Male Patient Location: [facility] [place] Reason For Study: Endocarditis Ordering Physician: CONNIE JIN Performed By: Nelly Monte RDCS  BSA: 2.1 m2 Height: 67 in Weight: 220 lb HR: 118 BP: 97/67 mmHg  _____________________________________________________________________________ __   Procedure LVAD Echocardiogram with two-dimensional, color and spectral Doppler performed. Technically difficult study.Extremely poor acoustic windows. Limited information was obtained during study. _____________________________________________________________________________ __   Interpretation Summary Technically difficult study.Extremely poor acoustic windows. Limited information was obtained during study. LVAD cannula was seen in the expected anatomic position in the LV apex. HM3 at 5500RPM. LVIDd 48mm. Septum probably normal. Aortic valve cannot be assessed. Flow velocities not obtained. Dilation of the inferior vena cava is present with abnormal respiratory variation in diameter. No pericardial effusion is present. _____________________________________________________________________________ __   Left Ventricle The Ejection Fraction is estimated at <20%. LVAD cannula was seen in the expected anatomic position in the LV apex. HM3 at 5500RPM. LVIDd 48mm. Septum probably normal. Aortic valve cannot be assessed. Flow velocities not obtained.  Right Ventricle Right ventricular function cannot be assessed due to poor image quality.  Vessels Dilation of the inferior vena cava is present with abnormal respiratory variation in diameter.  Pericardium No pericardial effusion is present.  _____________________________________________________________________________ __ MMode/2D Measurements & Calculations IVSd: 0.92 cm LVIDd: 4.8 cm LVIDs: 4.6 cm LVPWd: 0.88 cm  FS: 4.6 % LV mass(C)d: 147.6 grams LV mass(C)dI: 70.1 grams/m2 asc Aorta Diam: 3.5 cm RWT: 0.37    _____________________________________________________________________________ __    Report approved by: Graciela Tomlinson 11/16/2020 11:39 AM      Us Renal Complete    Result Date: 11/17/2020  EXAMINATION: US RENAL COMPLETE, 11/17/2020 1:43 PM COMPARISON: None. HISTORY: Rule out  obstruction TECHNIQUE: The kidneys and bladder were scanned in the standard fashion with specialized ultrasound transducer(s) using both gray scale and limited color/spectral Doppler techniques. FINDINGS: Right kidney: Measures 10.5 cm in length. Parenchyma is of normal thickness and echogenicity. No focal mass. No hydronephrosis. Left kidney: Measures 11.6 cm in length. Parenchyma is of normal thickness and echogenicity. No focal mass. No hydronephrosis. Bladder: Decompressed with Guevara catheter.     IMPRESSION: 1.  Normal renal ultrasound study. OSITO BRAVO MD    Xr Chest Port 1 View    Result Date: 2020  Exam: XR CHEST PORT 1 VW, 2020 2:19 PM Indication: PICC Placement Comparison: 2020 Findings: Right upper extremity PICC tip at the mid to low SVC. Left chest wall single lead automatic implantable cardiac defibrillator. Stable LVAD. Stable enlarged cardiac silhouette. The pulmonary vasculature is within normal limits. No pleural effusion or pneumothorax. No focal airspace opacity. Low lung volumes.     Impression: Right upper extremity PICC tip at the mid to low SVC. CHARLES HERNANDEZ DO    Transesophageal Echocardiogram    Result Date: 2020  [id number]^GLORIA^ANIKA    [facility],[place] [facility] [address] [place] [id number]   Name: WOLFGANG LEACH MRN: [id number] : 1955 Study Date: 2020 09:03 AM Age: 65 yrs Gender: Male Patient Location: [facility] [place] [place] Reason For Study: Bacteremia History: Sepsis Ordering Physician: ANIKA CASTRO Performed By: Maciel House  BSA: 2.1 m2 Height: 67 in Weight: 210 lb HR: 116 BP: 69/39 mmHg Attestation I was present during CHAMP probe placement by the fellow, Maciel House. I personally viewed the imaging and agree with the interpretation and report as documented by the fellow. _____________________________________________________________________________ __   Interpretation Summary S/P HM 3 LVAD. LVAD inflow cannula with normal doppler.  No vegetations on valves or ICD wire.  _____________________________________________________________________________ __   Procedure Transesophageal Echocardiogram with color and spectral Doppler performed. Procedure location Echo Lab. The procedure was performed in the Echo Lab. CHAMP Probe #58 was used during the procedure. Patient was sedated using Fentanyl 100 mcg. Patient was sedated using Versed 2 mg. The heart rate, respiratory rate, oxygen saturations, blood pressure, and response to care were monitored throughout the procedure with the assistance of the nurse. I determined this patient to be an appropriate candidate for the planned sedation and procedure and have reassessed the patient immediately prior to sedation and procedure. Total sedation time: 34 minutes of continuous bedside 1:1 monitoring. The Transducer was inserted without difficulty . The patient tolerated the procedure well. Complications None. Informed consent for Transesophegeal echo obtained. The patient's rhythm is paced. Good quality two-dimensional was performed and interpreted. Good quality color and spectral Doppler were performed and interpreted.  Left Ventricle Mild left ventricular dilation is present. Severely (EF 10-20%) reduced left ventricular function is present. LVAD cannula was seen in the expected anatomic position in the LV apex. S/P HM 3 LVAD 5500 RPM. Left ventricular wall thickness is normal.  Right Ventricle Moderate right ventricular dilation is present. Global right ventricular function is moderately reduced. A pacemaker lead is noted in the right ventricle. No vegetations seen on Single lead ICD wire.  Atria The left atrial appendage is normal. It is free of spontaneous echo contrast and thrombus. Severe biatrial enlargement is present. The atrial septum is intact as assessed by color Doppler .   Mitral Valve Mitral leaflet thickness is  normal. Mild to moderate mitral insufficiency is present.  Aortic Valve The aortic valve is tricuspid. The aortic valve remains closed with every beat. Mild aortic valve sclerosis is present. Mild aortic insufficiency is present.  Tricuspid Valve The tricuspid valve is normal. Mild to moderate tricuspid insufficiency is present.  Pulmonic Valve The pulmonic valve is normal. Trace pulmonic insufficiency is present.  Vessels The thoracic aorta is normal. Ascending aorta 2.9 cm.   Pericardium No pericardial effusion is present.  Compared to Previous Study This study was compared with the study from 11/16/2020 . There has been no change.  _____________________________________________________________________________ __        Report approved by: Graciela Dubose 11/19/2020 11:27 AM    _____________________________________________________________________________ __      Ct Abdomen Pelvis W/o Contrast    Result Date: 11/15/2020  EXAMINATION: CT ABDOMEN PELVIS W/O CONTRAST, 11/15/2020 2:53 PM TECHNIQUE:  Helical CT images from the lung bases through the pubic symphysis were obtained  without IV contrast. COMPARISON: Same-day ultrasound, CT 2/8/2020 HISTORY: Abd pain, fever, abscess suspected; Patient with VAD and driveline infection (S. aureus infection/MSSA) and now complicated with bacteremia, please evaluate for abscesses/intraabdominal infectious process/cutaneous-soft tissues skin infections FINDINGS: Abdomen and pelvis: Homogenous hepatic parenchyma, without focal liver lesion. No calcified gallstones. No intra or extrahepatic biliary dilatation. Moderate fatty replacement of the pancreatic parenchyma, without focal pancreatic lesion or ductal dilatation. Unremarkable spleen. Mild thickening of the adrenal glands bilaterally, without focal nodule, similar to prior. Unremarkable noncontrast appearance of the kidneys, without hydronephrosis or nephrolithiasis. The urinary bladder is distended and otherwise  unremarkable. Unremarkable prostate and seminal vesicles. No abnormally dilated loop of small or large bowel. Small to moderate volume simple ascites within the abdomen and pelvis, slightly increased in comparison to CT performed in February. No defined fluid collection is noted to suggest abscess. No intraperitoneal free air is noted. Small fat-containing umbilical and left inguinal hernias. Vascular patency cannot be assessed on these noncontrast images, however there is no evidence of infrarenal abdominal aortic aneurysm. No pathologically enlarged thoracic lymph nodes. Lung bases:  Partially visualized postsurgical changes of LVAD placement. Patency cannot be assessed on these noncontrast images. No air or fluid collection along the drive line. Trace bilateral pleural effusions and atelectatic changes, otherwise the lung bases are clear. Stable cardiomegaly. Small sliding type hiatal hernia. Bones and soft tissues: No acute or aggressive appearing osseous lesion. Stepwise grade 1 anterolisthesis of L3 on L4, and L4 on L5, with associated loss of intervertebral disc space at each level. 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 Mild diffuse body wall edema.     IMPRESSION: 1. Small to moderate volume simple intra-abdominal ascites, slightly increased in volume as compared to CT performed on 2/8/2020, compatible with third spacing of fluid. No defined fluid collection is identified to suggest abscess. No additional acute findings within the abdomen/pelvis. 2. Trace bilateral pleural effusions, and associated bibasilar atelectasis. The lung bases are otherwise clear. 3. Postprocedural changes of LVAD placement. The components are unremarkable on these noncontrast images, without associated air or inflammatory stranding to suggest drive line infection. I have personally reviewed the examination and initial interpretation and I agree with the findings. OSITO BRAVO MD

## 2020-11-20 NOTE — PROGRESS NOTES
[facility]: Post-Discharge Note  SITUATION                                                      Admission:    Admission Date: 11/14/20   Reason for Admission: Previous MSSA driveline infection  Discharge:   Discharge Date: 11/19/20  Discharge Diagnosis: Previous MSSA driveline infection    BACKGROUND                                                         Mr. Tanner is a 65 year old male who has a past medical history significant for NICM LVEF 15-20% s/p LVAD HM3 2/18/20 c/b by MSSA driveline infection (11/5/20), moderate nonobstructive CAD, severe MR, HTN, ABHINAV (uses CPAP), DM2, CKD III, HLD, ANA, and prior tobacco use who was admitted directly from ID clinic with GPC bacteremia, likely MSSA due to drive-line infection.    ASSESSMENT      Discharge Assessment  Patient reports symptoms are: Improved  Does the patient have all of their medications?: Yes  Does patient know what their new medications are for?: Yes  Does patient have a follow-up appointment scheduled?: Yes  Does patient have any other questions or concerns?: No    Post-op  Did the patient have surgery or a procedure: No  Fever: No  Chills: No  Eating & Drinking: eating and drinking without complaints/concerns  PO Intake: regular diet  Bowel Function: normal  Urinary Status: voiding without complaint/concerns        PLAN                                                      Outpatient Plan:     - BMP, CBC, INR check after discharge (anticoagulation clinic), 11/23 on Monday   - Cardiology OP appt  - ID OP appt  - PCP in 1 week after discharge    Future Appointments   Date Time Provider Department Center   12/15/2020  9:30 AM  LAB [facility]   12/15/2020 10:00 AM  CV DEVICE 1 [facility]   12/15/2020 10:30 AM Nader Cisneros MD CVC [facility]   12/15/2020 11:00 AM UC PFL C [facility]CPFT [facility]   12/15/2020 12:15 PM UCSCUS1 [facility]   1/29/2021 11:00 AM [facility]   3/10/2021  9:30 AM  LAB [facility]    3/10/2021 10:00 AM  CV DEVICE 1 [facility]   3/10/2021 10:30 AM Mervat Decker PA-C [facility]   6/10/2021 12:00 AM  ICD REMOTE [facility]           Stephanie Chan, [facility]

## 2020-11-23 ENCOUNTER — ANTICOAGULATION THERAPY VISIT (OUTPATIENT)
Dept: ANTICOAGULATION | Facility: CLINIC | Age: 65
End: 2020-11-23

## 2020-11-23 DIAGNOSIS — Z95.811 LVAD (LEFT VENTRICULAR ASSIST DEVICE) PRESENT (H): ICD-10-CM

## 2020-11-23 DIAGNOSIS — I50.22 CHRONIC SYSTOLIC CONGESTIVE HEART FAILURE (H): ICD-10-CM

## 2020-11-23 DIAGNOSIS — I48.0 PAROXYSMAL ATRIAL FIBRILLATION (H): ICD-10-CM

## 2020-11-23 LAB
ANION GAP SERPL CALCULATED.3IONS-SCNC: 11 MMOL/L
BACTERIA SPEC CULT: NO GROWTH
BACTERIA SPEC CULT: NO GROWTH
BASOPHILS - ABS (DIFF) - HISTORICAL: 0.08 K/UL (ref 0–0.2)
BASOPHILS NFR BLD AUTO: 0.7 % (ref 0–2)
BUN SERPL-MCNC: 41 MG/DL (ref 9–20)
BUN/CREATININE RATIO: 21
CALCIUM SERPL-MCNC: 8.5 MG/DL (ref 8.4–10.7)
CHLORIDE SERPLBLD-SCNC: 97 MMOL/L (ref 98–107)
CO2 SERPL-SCNC: 28 MMOL/L (ref 22–30)
CREAT SERPL-MCNC: 2 MG/DL (ref 0.5–1.2)
EOSINOPHIL COUNT (ABSOLUTE): 0.16 K/UL (ref 0–0.7)
EOSINOPHIL NFR BLD AUTO: 1.4 % (ref 0–6)
ERYTHROCYTE [DISTWIDTH] IN BLOOD BY AUTOMATED COUNT: 19.3 % (ref 11.5–14.8)
GFR SERPL CREATININE-BSD FRML MDRD: 36 ML/MIN/1.73M2
GLUCOSE SERPL-MCNC: 202 MG/DL (ref 74–106)
HCT VFR BLD AUTO: 31.6 % (ref 40–50)
HEMOGLOBIN: 9.5 G/DL (ref 13.5–17.5)
INR PPP: 1.8 (ref 0.9–1.1)
LYMPHOCYTES # BLD AUTO: 1.07 K/UL (ref 0.8–4.1)
LYMPHOCYTES NFR BLD AUTO: 9.2 % (ref 16–43)
MCH RBC QN AUTO: 20.6 PG (ref 25.5–34)
MCHC RBC AUTO-ENTMCNC: 29.9 G/DL (ref 31.5–36.5)
MCV RBC AUTO: 69 FL (ref 80–98)
MONOCYTES # BLD AUTO: 0.58 K/UL (ref 0–1)
MONOCYTES NFR BLD AUTO: 5 % (ref 3–10)
NEUTROPHILS # BLD AUTO: 9.71 K/UL (ref 1.8–8)
NEUTROPHILS NFR BLD AUTO: 83.7 % (ref 45–77)
PLATELET # BLD AUTO: 624 K/UL (ref 140–400)
POTASSIUM SERPL-SCNC: 3.9 MMOL/L (ref 3.5–5.1)
RBC # BLD AUTO: 4.58 M/UL (ref 4.4–5.8)
SODIUM SERPL-SCNC: 136 MMOL/L (ref 137–145)
SPECIMEN SOURCE: NORMAL
SPECIMEN SOURCE: NORMAL
WBC # BLD AUTO: 11.6 K/UL (ref 4–11)

## 2020-11-23 NOTE — PROGRESS NOTES
ANTICOAGULATION FOLLOW-UP CLINIC VISIT    Patient Name:  Eliseo Tanner  Date:  2020  Contact Type:  Telephone    SUBJECTIVE:  Patient Findings     Positives:  Hospital admission ( - 2020 with a drive line infection)    Comments:  Spoke with Eliseo.  He reports he is feeling better, able to eat.  He had vitamin K PO on 2020 while he was hospitalized.        Clinical Outcomes     Comments:  Spoke with Eliseo.  He reports he is feeling better, able to eat.  He had vitamin K PO on 2020 while he was hospitalized.           OBJECTIVE    Recent labs: (last 7 days)     20   INR 1.8*       ASSESSMENT / PLAN  INR assessment SUB    Recheck INR In: 2 DAYS    INR Location Outside lab      Anticoagulation Summary  As of 2020    INR goal:  2.0-3.0   TTR:  90.0 % (7.8 mo)   INR used for dosin.8 (2020)   Warfarin maintenance plan:  4 mg (4 mg x 1) every day   Full warfarin instructions:  4 mg every day   Weekly warfarin total:  28 mg   No change documented:  Krysta Mcdaniel RN   Plan last modified:  Krysta Mcdaniel RN (2020)   Next INR check:  2020   Priority:  Critical   Target end date:  Indefinite    Indications    LVAD (left ventricular assist device) present (H) [Z95.811]  Chronic systolic congestive heart failure (H) [I50.22]  Paroxysmal atrial fibrillation (H) [I48.0]             Anticoagulation Episode Summary     INR check location:      Preferred lab:      Send INR reminders to:  [facility]    Comments:  Patient on Amiodarone +++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 RICHARDS's NOTE 2/15/20++++  HM 3  placed 2020, 81mg ASA, Speak to spouse, Veronique at [phone]  2020:  Lab: [facility]:  ph: [phone] opt 5;   fax: [phone]      Anticoagulation Care Providers     Provider Role Specialty Phone number    Nader Cisneros MD Referring Cardiovascular Disease [phone]    Jess Meraz MD Referring Internal  Medicine [phone]            See the Encounter Report to view Anticoagulation Flowsheet and Dosing Calendar (Go to Encounters tab in chart review, and find the Anticoagulation Therapy Visit)    Spoke with Eliseo.  He will go back to his maintenance dose of warfarin and recheck INR in two days, per LVAD protocol.  He continues on cefazolin IV.      Patient had LVAD placed on:   2/18/2020  Type of LVAD: HM 3   Patient's current Aspirin dose: 81 mg daily  LVAD Protocol followed: Yes      Krysta Mcdaniel RN

## 2020-11-24 ENCOUNTER — EXTERNAL ORDER RESULTS (OUTPATIENT)
Dept: INFECTIOUS DISEASES | Facility: CLINIC | Age: 65
End: 2020-11-24

## 2020-11-25 ENCOUNTER — ANTICOAGULATION THERAPY VISIT (OUTPATIENT)
Dept: ANTICOAGULATION | Facility: CLINIC | Age: 65
End: 2020-11-25

## 2020-11-25 DIAGNOSIS — I48.0 PAROXYSMAL ATRIAL FIBRILLATION (H): ICD-10-CM

## 2020-11-25 DIAGNOSIS — I50.22 CHRONIC SYSTOLIC CONGESTIVE HEART FAILURE (H): ICD-10-CM

## 2020-11-25 DIAGNOSIS — Z95.811 LVAD (LEFT VENTRICULAR ASSIST DEVICE) PRESENT (H): ICD-10-CM

## 2020-11-25 LAB — INR PPP: 2 (ref 0.9–1.1)

## 2020-11-25 NOTE — PROGRESS NOTES
ANTICOAGULATION FOLLOW-UP CLINIC VISIT    Patient Name:  Eliseo Tanner  Date:  2020  Contact Type:  Telephone    SUBJECTIVE:  Patient Findings     Comments:  Pt continues with Cefazolin Infusions for about 5.5 weeks         Clinical Outcomes     Negatives:  Major bleeding event, Thromboembolic event, Anticoagulation-related hospital admission, Anticoagulation-related ED visit, Anticoagulation-related fatality    Comments:  Pt continues with Cefazolin Infusions for about 5.5 weeks            OBJECTIVE    Recent labs: (last 7 days)     20   INR 2.0*       ASSESSMENT / PLAN  INR assessment THER    Recheck INR In: 5 DAYS    INR Location Outside lab      Anticoagulation Summary  As of 2020    INR goal:  2.0-3.0   TTR:  89.2 % (7.8 mo)   INR used for dosin.0 (2020)   Warfarin maintenance plan:  4 mg (4 mg x 1) every day   Full warfarin instructions:  4 mg every day   Weekly warfarin total:  28 mg   No change documented:  Luiza Perkins RN   Plan last modified:  Krysta Mcdaniel RN (2020)   Next INR check:  2020   Priority:  Critical   Target end date:  Indefinite    Indications    LVAD (left ventricular assist device) present (H) [Z95.811]  Chronic systolic congestive heart failure (H) [I50.22]  Paroxysmal atrial fibrillation (H) [I48.0]             Anticoagulation Episode Summary     INR check location:      Preferred lab:      Send INR reminders to:  [facility]    Comments:  Patient on Amiodarone +++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 RICHARDS's NOTE 2/15/20++++   3  placed 2020, 81mg ASA, Speak to spouse, Veronique at [phone]  2020:  Lab: [facility]:  ph: [phone] opt 5;   fax: [phone]      Anticoagulation Care Providers     Provider Role Specialty Phone number    Nader Cisneros MD Referring Cardiovascular Disease [phone]            See the Encounter Report to view Anticoagulation Flowsheet and Dosing Calendar (Go to  Encounters tab in chart review, and find the Anticoagulation Therapy Visit)    Spoke with patient. Gave them their lab results and new warfarin recommendation.  No changes in health, medication, or diet. No missed doses, no falls. No signs or symptoms of bleed or clotting.     Patient had LVAD placed on:   2/18/20  Type of LVAD: HM 3  Patient's current Aspirin dose: ASA 81mg Daily  LVAD Protocol followed: Yes   If Not Followed Explanation:  N/A    Luiza Perkins RN

## 2020-11-30 DIAGNOSIS — I42.8 NONISCHEMIC CARDIOMYOPATHY (H): ICD-10-CM

## 2020-11-30 LAB — INR PPP: 2.2 (ref 0.9–1.1)

## 2020-11-30 RX ORDER — BUMETANIDE 2 MG/1
2 TABLET ORAL
Qty: 180 TABLET | Refills: 3 | Status: SHIPPED | OUTPATIENT
Start: 2020-11-30 | End: 2020-12-11

## 2020-12-01 ENCOUNTER — ANTICOAGULATION THERAPY VISIT (OUTPATIENT)
Dept: ANTICOAGULATION | Facility: CLINIC | Age: 65
End: 2020-12-01

## 2020-12-01 DIAGNOSIS — I48.0 PAROXYSMAL ATRIAL FIBRILLATION (H): ICD-10-CM

## 2020-12-01 DIAGNOSIS — I50.22 CHRONIC SYSTOLIC CONGESTIVE HEART FAILURE (H): ICD-10-CM

## 2020-12-01 DIAGNOSIS — Z95.811 LVAD (LEFT VENTRICULAR ASSIST DEVICE) PRESENT (H): ICD-10-CM

## 2020-12-01 NOTE — PROGRESS NOTES
ANTICOAGULATION FOLLOW-UP CLINIC VISIT    Patient Name:  Eliseo Tanner  Date:  2020  Contact Type:  Telephone    SUBJECTIVE:  Patient Findings     Comments:  Continues with Cefazolin Infusions         Clinical Outcomes     Comments:  Continues with Cefazolin Infusions            OBJECTIVE    Recent labs: (last 7 days)     20   INR 2.2*       ASSESSMENT / PLAN  INR assessment THER    Recheck INR In: 1 WEEK    INR Location Outside lab      Anticoagulation Summary  As of 2020    INR goal:  2.0-3.0   TTR:  89.5 % (8 mo)   INR used for dosin.2 (2020)   Warfarin maintenance plan:  4 mg (4 mg x 1) every day   Full warfarin instructions:  4 mg every day   Weekly warfarin total:  28 mg   No change documented:  Luiza Perkins RN   Plan last modified:  Krysta Mcdaniel RN (2020)   Next INR check:  2020   Priority:  Critical   Target end date:  Indefinite    Indications    LVAD (left ventricular assist device) present (H) [Z95.811]  Chronic systolic congestive heart failure (H) [I50.22]  Paroxysmal atrial fibrillation (H) [I48.0]             Anticoagulation Episode Summary     INR check location:      Preferred lab:      Send INR reminders to:  [facility]    Comments:  Patient on Amiodarone +++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 RICHARDS's NOTE 2/15/20++++  HM 3  placed 2020, 81mg ASA, Speak to spouse, Veronique at [phone]  2020:  Lab: [facility]:  ph: [phone] opt 5;   fax: [phone]      Anticoagulation Care Providers     Provider Role Specialty Phone number    Nader Cisneros MD Referring Cardiovascular Disease [phone]            See the Encounter Report to view Anticoagulation Flowsheet and Dosing Calendar (Go to Encounters tab in chart review, and find the Anticoagulation Therapy Visit)    Spoke with patient. Gave them their lab results and new warfarin recommendation.  No changes in health, medication, or diet. No missed doses, no  falls. No signs or symptoms of bleed or clotting.     Patient had LVAD placed on:   2/18/20  Type of LVAD: HM 3  Patient's current Aspirin dose: ASA 81mg Daily  LVAD Protocol followed: Yes   If Not Followed Explanation:  N/A    Luiza Perkins RN

## 2020-12-07 ENCOUNTER — TELEPHONE (OUTPATIENT)
Dept: ANTICOAGULATION | Facility: CLINIC | Age: 65
End: 2020-12-07

## 2020-12-07 ENCOUNTER — ANTICOAGULATION THERAPY VISIT (OUTPATIENT)
Dept: ANTICOAGULATION | Facility: CLINIC | Age: 65
End: 2020-12-07

## 2020-12-07 DIAGNOSIS — Z95.811 LVAD (LEFT VENTRICULAR ASSIST DEVICE) PRESENT (H): ICD-10-CM

## 2020-12-07 DIAGNOSIS — I50.22 CHRONIC SYSTOLIC CONGESTIVE HEART FAILURE (H): ICD-10-CM

## 2020-12-07 DIAGNOSIS — I48.0 PAROXYSMAL ATRIAL FIBRILLATION (H): ICD-10-CM

## 2020-12-07 LAB — INR PPP: 2.9 (ref 0.9–1.1)

## 2020-12-07 NOTE — TELEPHONE ENCOUNTER
Anticoagulation Management    Discussed INR home monitoring program with Eliseo Tanner reviewing:      [employer] requirements: >= 3 months of anticoagulation therapy, indication for chronic anticoagulation and order from provider    Required testing frequency (q1-2 weeks)    Home meters, testing supplies, meter training, and reporting of INR results done through an outside company. Patient would be contacted by home monitoring company to review insurance coverage with home monitoring company prior to enrolling.    [facility] would continue to receive and manage INR results.    Home monitoring application may take several weeks and must continue to follow up with recommended INR monitoring in clinic until receives monitor and training completed.     Home monitoring terms reviewed with patient      Patient agrees to frequency of testing as directed by referring provider ( weekly or biweekly) Yes    Testing to be performed during business hours of [facility] Yes    Patient agrees they have the skill ( or a designated caregiver) necessary to perform the self test Yes    Patient agrees to report all INR results to INR home monitoring company Yes    Patient agrees to have additional INR test in clinic if a home result is critical Yes    Patient agrees to schedule an INR test at a [facility] clinic yearly for technique observation and quality check of INR results with their home meter Yes    Patient agrees to use a [facility] approved service provider and device for home monitoring Yes    [facility]    Referring provider: Dr. Cisneros     Referring providers Clinic Fax number [phone]    Eliseo Tanner is interested home INR monitoring and requests order be submitted.

## 2020-12-07 NOTE — PROGRESS NOTES
ANTICOAGULATION FOLLOW-UP CLINIC VISIT    Patient Name:  Eliseo Tanner  Date:  2020  Contact Type:  Telephone    SUBJECTIVE:  Patient Findings     Comments:  Upcoming ID appt-currently receiving IV Ancef infusions. Rationale for testing at end of week-video visit with ID then with Cardiology next week. Reports drinking 2-3 boosts/week.         Clinical Outcomes     Comments:  Upcoming ID appt-currently receiving IV Ancef infusions. Rationale for testing at end of week-video visit with ID then with Cardiology next week. Reports drinking 2-3 boosts/week.            OBJECTIVE    Recent labs: (last 7 days)     20   INR 2.9*       ASSESSMENT / PLAN  INR assessment THER    Recheck INR In: 4 DAYS    INR Location Outside lab      Anticoagulation Summary  As of 2020    INR goal:  2.0-3.0   TTR:  89.8 % (8.2 mo)   INR used for dosin.9 (2020)   Warfarin maintenance plan:  4 mg (4 mg x 1) every day   Full warfarin instructions:  4 mg every day   Weekly warfarin total:  28 mg   No change documented:  Gianna Allen RN   Plan last modified:  Krysta Mcdaniel RN (2020)   Next INR check:  2020   Priority:  Critical   Target end date:  Indefinite    Indications    LVAD (left ventricular assist device) present (H) [Z95.811]  Chronic systolic congestive heart failure (H) [I50.22]  Paroxysmal atrial fibrillation (H) [I48.0]             Anticoagulation Episode Summary     INR check location:      Preferred lab:      Send INR reminders to:   [facility]    Comments:  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  HM 3  placed 2020, 81mg ASA, Speak to spouse, Veronique at [phone]  2020:  Lab: [facility]:  ph: [phone] opt 5;   fax: [phone]      Anticoagulation Care Providers     Provider Role Specialty Phone number    Nader Cisneros MD Referring Cardiovascular Disease [phone]            See the Encounter  Report to view Anticoagulation Flowsheet and Dosing Calendar (Go to Encounters tab in chart review, and find the Anticoagulation Therapy Visit)    Spoke with patient's spouse. Gave them their lab results and new warfarin recommendation. Patient currently receiving IV Ancef infusions with ID appt at the end of the week. Reports drinking 2-3 boosts/week. No missed doses, no falls. No signs or symptoms of bleed or clotting.     PACO MARQUEZ RN        12/7/20 Spoke to [facility] in regards to home monitor.  Started paperwork.  Faxed orders to outside lab for 12/11 draw.  TN

## 2020-12-08 DIAGNOSIS — I48.0 PAROXYSMAL ATRIAL FIBRILLATION (H): Primary | ICD-10-CM

## 2020-12-08 DIAGNOSIS — I50.22 CHRONIC SYSTOLIC CONGESTIVE HEART FAILURE (H): ICD-10-CM

## 2020-12-08 DIAGNOSIS — Z95.811 LVAD (LEFT VENTRICULAR ASSIST DEVICE) PRESENT (H): ICD-10-CM

## 2020-12-09 ENCOUNTER — TELEPHONE (OUTPATIENT)
Dept: INFECTIOUS DISEASES | Facility: CLINIC | Age: 65
End: 2020-12-09

## 2020-12-09 ENCOUNTER — DOCUMENTATION ONLY (OUTPATIENT)
Dept: ANTICOAGULATION | Facility: CLINIC | Age: 65
End: 2020-12-09

## 2020-12-09 DIAGNOSIS — Z95.811 LVAD (LEFT VENTRICULAR ASSIST DEVICE) PRESENT (H): Primary | ICD-10-CM

## 2020-12-09 DIAGNOSIS — T82.9XXA COMPLICATION INVOLVING LEFT VENTRICULAR ASSIST DEVICE (LVAD), INITIAL ENCOUNTER: ICD-10-CM

## 2020-12-09 DIAGNOSIS — Z95.811 LVAD (LEFT VENTRICULAR ASSIST DEVICE) PRESENT (H): ICD-10-CM

## 2020-12-09 DIAGNOSIS — I48.0 PAROXYSMAL ATRIAL FIBRILLATION (H): ICD-10-CM

## 2020-12-09 DIAGNOSIS — I50.22 CHRONIC SYSTOLIC CONGESTIVE HEART FAILURE (H): ICD-10-CM

## 2020-12-09 NOTE — TELEPHONE ENCOUNTER
Faxed lab orders to [facility] per [facility]'s request- weekly lab orders of CBC and BMP and requested that results be faxed to us and [facility]. Also requested that a set be drawn prior to 12/11 appt with Dr. Bhatti.  Antonette Pittman RN

## 2020-12-09 NOTE — PROGRESS NOTES
Faxed INR standing order with a copy of our electronically signed INR standing order by Dr. Nader Cisneros to [facility].   
11-Oct-2018 00:05

## 2020-12-11 ENCOUNTER — ANTICOAGULATION THERAPY VISIT (OUTPATIENT)
Dept: ANTICOAGULATION | Facility: CLINIC | Age: 65
End: 2020-12-11

## 2020-12-11 ENCOUNTER — VIRTUAL VISIT (OUTPATIENT)
Dept: INFECTIOUS DISEASES | Facility: CLINIC | Age: 65
End: 2020-12-11
Attending: INTERNAL MEDICINE
Payer: MEDICARE

## 2020-12-11 ENCOUNTER — EXTERNAL ORDER RESULTS (OUTPATIENT)
Dept: INFECTIOUS DISEASES | Facility: CLINIC | Age: 65
End: 2020-12-11

## 2020-12-11 ENCOUNTER — CARE COORDINATION (OUTPATIENT)
Dept: CARDIOLOGY | Facility: CLINIC | Age: 65
End: 2020-12-11

## 2020-12-11 DIAGNOSIS — Z95.811 LVAD (LEFT VENTRICULAR ASSIST DEVICE) PRESENT (H): ICD-10-CM

## 2020-12-11 DIAGNOSIS — I48.0 PAROXYSMAL ATRIAL FIBRILLATION (H): ICD-10-CM

## 2020-12-11 DIAGNOSIS — I42.8 NONISCHEMIC CARDIOMYOPATHY (H): ICD-10-CM

## 2020-12-11 DIAGNOSIS — T82.7XXA INFECTION ASSOCIATED WITH DRIVELINE OF LEFT VENTRICULAR ASSIST DEVICE (LVAD) (H): Primary | ICD-10-CM

## 2020-12-11 DIAGNOSIS — I50.22 CHRONIC SYSTOLIC CONGESTIVE HEART FAILURE (H): Primary | ICD-10-CM

## 2020-12-11 DIAGNOSIS — I50.22 CHRONIC SYSTOLIC CONGESTIVE HEART FAILURE (H): ICD-10-CM

## 2020-12-11 LAB
ANION GAP SERPL CALCULATED.3IONS-SCNC: 11 MMOL/L
BUN SERPL-MCNC: 78 MG/DL (ref 9–20)
BUN/CREATININE RATIO: 31
CALCIUM SERPL-MCNC: 9.2 MG/DL (ref 8.4–10.7)
CHLORIDE SERPLBLD-SCNC: 98 MMOL/L (ref 98–107)
CO2 SERPL-SCNC: 28 MMOL/L (ref 22–30)
CREAT SERPL-MCNC: 2.5 MG/DL (ref 0.6–1.2)
GFR SERPL CREATININE-BSD FRML MDRD: ABNORMAL ML/MIN/1.73M2
GLUCOSE SERPL-MCNC: 138 MG/DL (ref 74–106)
INR PPP: 2.8 (ref 0.9–1.1)
POTASSIUM SERPL-SCNC: 4.4 MMOL/L (ref 3.5–5.1)
SODIUM SERPL-SCNC: 137 MMOL/L (ref 137–145)

## 2020-12-11 PROCEDURE — 99443 PR PHYSICIAN TELEPHONE EVALUATION 21-30 MIN: CPT | Mod: 95 | Performed by: INTERNAL MEDICINE

## 2020-12-11 RX ORDER — BUMETANIDE 1 MG/1
1 TABLET ORAL
Qty: 180 TABLET | Refills: 3 | Status: SHIPPED | OUTPATIENT
Start: 2020-12-11 | End: 2020-12-17

## 2020-12-11 ASSESSMENT — PAIN SCALES - GENERAL: PAINLEVEL: NO PAIN (0)

## 2020-12-11 NOTE — PROGRESS NOTES
Labs rcvd  Per Dr. Cisneros: decrease Bumex to 1 mg BID & repeat labs on Monday (BMP & CBC w/diff, previously ordered by Dr. Bhatti).  RTC Thursday as previously scheduled.  Pt advised & verbalized understanding.

## 2020-12-11 NOTE — LETTER
"12/11/2020       RE: Eliseo Tanner  [address] [facility] [address]     Dear Colleague,    Thank you for referring your patient, Eliseo Tanner, to the [facility] [place] at [facility]. Please see a copy of my visit note below.    Eliseo Tanner is a 65 year old male who is being evaluated via a billable telephone visit.      The patient has been notified of following:     \"This telephone visit will be conducted via a call between you and your physician/provider. We have found that certain health care needs can be provided without the need for a physical exam.  This service lets us provide the care you need with a short phone conversation.  If a prescription is necessary we can send it directly to your pharmacy.  If lab work is needed we can place an order for that and you can then stop by our lab to have the test done at a later time.    Telephone visits are billed at different rates depending on your insurance coverage. During this emergency period, for some insurers they may be billed the same as an in-person visit.  Please reach out to your insurance provider with any questions.    If during the course of the call the physician/provider feels a telephone visit is not appropriate, you will not be charged for this service.\"    Patient has given verbal consent for Telephone visit?  Yes    What phone number would you like to be contacted at? [phone]    How would you like to obtain your AVS? Maria C    Phone call duration: 25 minutes    Negar Bhatti DO        [facility]  General Infectious Disease Clinic Note:  Hospital follow up     Patient:  Eliseo Tanner, Date of birth 1955, Medical record number [id number]  Date of Visit:  12/11/2020         Assessment and Recommendations:   Assessment:  65 y/o male with chronic systolic HF and NICM s/p HM 3 on 2/18/2020 c/b polymicrobial " bacteremia (P mirabilis and E faecalis), ICD placement, ABHINAV on CPAP, type II DM, CKD stage III who presented in November 2020 with complicated MSSA bacteremia 2/2 drive line infection.      1. Complicated MSSA bacteremia w/ associated MSSA LVAD drive line infection: developed pain around exit site followed by drainage~ 11/1. Initially started on doxycycline 11/5 but switched to tmp/smx when the culture revealed MSSA resistant to doxy-subsequently switched to tmp/smx 1 S.S. BID. Blood cultures were initially negative but 11/12 and 11/14 were positive for MSSA. Blood cultures cleared 11/15.  Repeat LVAD drainage cultures grew MSSA again. CT A/P was negative for a deeper drive line infection. TTE was poor quality and the valves were not well visualized. CHAMP negative for vegetations on valves or ICD leads. Currently on cefazolin 2 grams IV q 8 hours. He is doing well with the injections at home. Planning for a 6 week course followed by suppression with cephalexin. Drainage around LVAD site has improved significantly. No other systemic signs of infection.    2. Acute on chronic BERNARDA: discussed w/ cardiology. Cr and BUN elevated-they will adjust the diuretic doses. Dose reduced the cefazolin because of drop in crcl ~ 27.     Recommendations:  1. Decrease cefazolin to 2 grams IV q 12 hours (crcl ~ 27). Will monitor crcl to see if the dose needs to be adjusted in the future.  2. Duration of therapy: 6 weeks from 11/15/20 to 12/27/20. After he completes IV will transition him to cephalexin.   3. After he stops IV cefazolin he should start cephalexin 500 mg po q 12 hours. Order placed w/ a start date of 12/28/20.   4. I spoke with Dr. Doe and he will work with coordinator to adjust diuretics (BUN and Creatinine elevated)  5. Repeat CMP, CBC w/ diff and CRP on Monday 12/14  6. Please alert me to labs from Monday 12/14 and weekly labs    Negar Bhatti DO  Pager [phone]         History of the Infectious Disease lllness:      Discharged from hospital 11/19. Has been on Cefazolin and is receiving 2 grams q 8 hours (6:30am, 2 pm, 10 pm). Wife does injections. PICC line working okay. Every Monday has dressing changed. Antibiotic deliveries have been okay.   No fever, chills. Continues to have some drainage around the VAD but has significantly improved. Wife does dressing changes and has not noted purulent drainage. He did have a   bump above the drive line that hurt with palpation but it now is improved. Describes it as a solid puffy area w/o no drainage.   VAD position okay has been okay-no significant manipulation.     Fluid status okay. Weight 203# --> 208# ~ 4 days ago-> 203#. Diuretics changed during his hospitalization.  Breathing okay. Occasional cough but non-productive. Creatinine and BUN increased.    Review of Systems:  CONSTITUTIONAL:  No fevers or chills. No night sweats.  RESPIRATORY:  + intermittent non-productive cough, no sputum or dyspnea  CARDIOVASCULAR:  negative for chest pain, heart palpitations  GASTROINTESTINAL:  negative for nausea, vomiting, diarrhea or constipation  HEME:  No easy bruising or bleeding  INTEGUMENT:  Drainage around LVAD site improved. PICC line intact w/ occlusive dressing.    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)      LVAD (left ventricular assist device) present (H)        Past Surgical History:   Procedure Laterality Date     ANESTHESIA CARDIOVERSION N/A 2/28/2020    Procedure: ANESTHESIA, FOR CARDIOVERSION;  Surgeon: GENERIC ANESTHESIA PROVIDER;  Location: [facility] OR     CV CENTRAL VENOUS CATHETER PLACEMENT N/A 2/13/2020    Procedure: Central Venous Catheter Placement;  Surgeon: Chente Moss MD;  Location: [facility] HEART CARDIAC CATH LAB     CV INTRA-AORTIC BALLOON PUMP INSERTION  N/A 2020    Procedure: Intra-Aortic Balloon Pump Insertion;  Surgeon: Jose Baldwin MD;  Location:  HEART CARDIAC CATH LAB     CV INTRA-AORTIC BALLOON PUMP INSERTION N/A 2020    Procedure: Intra-Aortic Balloon Pump Insertion;  Surgeon: Chente Moss MD;  Location:  HEART CARDIAC CATH LAB     CV RIGHT HEART CATH N/A 2020    Procedure: CV RIGHT HEART CATH;  Surgeon: Dalton Baeza MD;  Location:  HEART CARDIAC CATH LAB     CV SWAN LUCIANA PROCEDURE N/A 2020    Procedure: [place] Procedure;  Surgeon: Chente Moss MD;  Location:  HEART CARDIAC CATH LAB     EP ICD Bilateral 3/16/2020    Procedure: EP ICD;  Surgeon: Dali Day MD;  Location:  HEART CARDIAC CATH LAB     INSERT VENTRICULAR ASSIST DEVICE LEFT (HEARTMATE II) N/A 2020    Procedure: INSERTION, LEFT VENTRICULAR ASSIST DEVICE (HEARTMATE III);  Surgeon: Mac Jaramillo MD;  Location:  OR     THORACOSCOPY Right 3/6/2020    Procedure: RIGHT VIDEO-ASSISTED THORASCOPIC SURGERY, EVACUATION OF HEMOTHORAX, PLACEMENT OF CHEST TUBES;  Surgeon: William Gan MD;  Location:  OR       No family history on file.    Social History     Social History Narrative     Not on file     Social History     Tobacco Use     Smoking status: Former Smoker     Quit date: 1994     Years since quittin.7     Smokeless tobacco: Never Used   Substance Use Topics     Alcohol use: None     Drug use: None       Immunization History   Administered Date(s) Administered     Flu, Unspecified 11/15/2019     Influenza (High Dose) 3 valent vaccine 10/25/2019     Pneumo Conj 13-V (2010&after) 2018       Patient Active Problem List   Diagnosis     Acute on chronic systolic congestive heart failure (H)     Anxiety     CKD (chronic kidney disease) stage 3, GFR 30-59 ml/min     Coronary artery disease involving native coronary artery of native heart without angina pectoris     GERD (gastroesophageal reflux  disease)     Gout     Hyperlipidemia with target LDL less than 100     Nonischemic cardiomyopathy (H)     Non-nephrotic range proteinuria     ABHINAV on CPAP     Type 2 diabetes mellitus with stage 3 chronic kidney disease, without long-term current use of insulin (H)     Heart failure (H)     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)     Cardiac arrest (H)     LVAD (left ventricular assist device) present (H)     Chronic systolic congestive heart failure (H)     CKD (chronic kidney disease) stage 4, GFR 15-29 ml/min (H)     Bacteremia     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)     Paroxysmal atrial fibrillation (H)       Outpatient Medications Marked as Taking for the 12/11/20 encounter (Virtual Visit) with Ngear Bhatti DO   Medication Sig     acetaminophen (TYLENOL) 325 MG tablet Take 2 tablets (650 mg) by mouth every 4 hours as needed for mild pain or fever     albuterol (PROAIR HFA/PROVENTIL HFA/VENTOLIN HFA) 108 (90 Base) MCG/ACT inhaler Inhale 2 puffs into the lungs every 6 hours as needed for wheezing     allopurinol (ZYLOPRIM) 100 MG tablet 2 tablets (200 mg) by Oral or Feeding Tube route daily     amiodarone (PACERONE) 200 MG tablet Take 1 tablet (200 mg) by mouth daily     aspirin (ASA) 81 MG EC tablet Take 1 tablet (81 mg) by mouth daily     atorvastatin (LIPITOR) 20 MG tablet Take 20 mg by mouth daily     bisacodyl (DULCOLAX) 10 MG suppository Place 1 suppository (10 mg) rectally daily as needed for constipation     bumetanide (BUMEX) 2 MG tablet Take 1 tablet (2 mg) by mouth 2 times daily     co-enzyme Q-10 200 MG CAPS 200 mg by Oral or Feeding Tube route daily     FLUoxetine (PROZAC) 20 MG capsule Take 1 capsule (20 mg) by mouth daily     hydrALAZINE (APRESOLINE) 25 MG tablet Take 4 tablets (100 mg) by mouth 3 times daily     insulin glargine (BASAGLAR KWIKPEN) 100 UNIT/ML pen Inject 12 Units Subcutaneous At Bedtime     insulin pen needle (BD JAIME  U/F) 32G X 4 MM miscellaneous      lisinopril (ZESTRIL) 10 MG tablet Take 1 tablet (10 mg) by mouth daily     magnesium oxide (MAG-OX) 400 MG tablet 1 tablet (400 mg) by Oral or Feeding Tube route daily     multivitamin w/minerals (THERA-VIT-M) tablet Take 1 tablet by mouth daily     nitroGLYcerin (NITROSTAT) 0.4 MG sublingual tablet For chest pain place 1 tablet under the tongue every 5 minutes for 3 doses. If symptoms persist 5 minutes after 1st dose call 911.     omeprazole (PRILOSEC) 20 MG DR capsule Take 20 mg by mouth daily     polyethylene glycol (MIRALAX) 17 g packet Take 17 g by mouth daily     potassium chloride ER (KLOR-CON M) 20 MEQ CR tablet Take 20 mEq by mouth daily     senna-docusate (SENOKOT-S/PERICOLACE) 8.6-50 MG tablet Take 1 tablet by mouth 2 times daily     tamsulosin (FLOMAX) 0.4 MG capsule Take 1 capsule (0.4 mg) by mouth daily This med helps with urinary retention     warfarin ANTICOAGULANT (COUMADIN) 3 MG tablet Take 1 tablet (3 mg) by mouth daily (Patient taking differently: Take 4 mg by mouth daily )     zolpidem (AMBIEN) 5 MG tablet Take 5 mg by mouth nightly as needed for Insomnia       Allergies   Allergen Reactions     Heparin      HIT screen positive 2/14/20, reflex DAVINA negative; however heme recommended treating as if positive  HIT screen negative 2/11/20     Oxycodone Itching and Other (See Comments)     Chlorhexidine Rash              Physical Exam:   Vitals were reviewed.  All vitals stable  There were no vitals taken for this visit.  Wt Readings from Last 4 Encounters:   11/18/20 95.3 kg (210 lb 1.6 oz)   11/12/20 98.9 kg (218 lb)   09/21/20 98 kg (216 lb)   09/21/20 98 kg (216 lb)       Exam:  GENERAL: well-developed, well-nourished, alert, oriented, in no acute distress.  HEAD: Head is normocephalic, atraumatic   EYES: Eyes have anicteric sclerae.    ENT: Oropharynx is moist without exudates or ulcers.  NECK: Supple.  LUNGS: Clear to auscultation.  CARDIOVASCULAR: Regular rate  and rhythm with no murmurs, gallops or rubs.  ABDOMEN: Normal bowel sounds, soft, nontender.  SKIN: No acute rashes. Line is in place without any surrounding erythema.  NEUROLOGIC: Grossly nonfocal.         Laboratory Data:   Inflammatory Markers    Recent Labs   Lab Test 11/05/20 09/21/20  1438 03/04/20  0757 02/08/20  0408   CRP 54.9 6.6 77.0* 21.0*     Metabolic Studies    Recent Labs   Lab Test 12/11/20  0848 11/23/20  1100 11/19/20  0639 11/19/20  0639 11/15/20  0541 11/15/20  0541 09/21/20  1440 09/21/20  1440 03/06/20  0322 03/06/20  0322 02/13/20  0206 02/13/20  0206 02/08/20  0408 02/08/20  0408    136*  --  134   < > 133   < >  --    < > 135   < > 132*   < > 134   POTASSIUM 4.4 3.9   < > 3.4   < > 4.4   < >  --    < > 4.1   < > 4.1  4.2   < > 3.5   CHLORIDE 98.0 97.0*  --  98   < > 102   < >  --    < > 104   < > 96   < > 103   CO2 28.0 28.0  --  32   < > 21   < >  --    < > 30   < > 22   < > 23   ANIONGAP 11 11  --  5   < > 10   < >  --    < > 1*   < > 14   < > 8   BUN 78*  31 41*  21   < > 40*   < > 31*   < >  --    < > 16   < > 34*   < > 50*   CR 2.5* 2.0*  --  1.56*   < > 1.78*   < >  --    < > 1.02   < > 1.58*   < > 2.81*   GFRESTIMATED 28q* 36  --  45*   < > 39*   < >  --    < > 76   < > 45*   < > 22*   * 202*  --  119*   < > 143*   < >  --    < > 149*   < > 154*   < > 155*   CALOS 9.2 8.5  --  8.2*   < > 8.5   < >  --    < > 8.0*   < > 8.9   < > 8.2*   PHOS  --   --   --   --   --   --   --   --   --  3.8   < > 5.4*   < > 5.0*   MAG  --   --   --   --   --  2.0  --   --    < > 2.2   < > 2.0   < >  --    URIC  --   --   --   --   --   --   --   --   --   --   --   --   --  7.5*   LACT  --   --   --   --   --   --   --  1.3   < >  --    < > 9.5*   < > 0.6*   CKT  --   --   --   --   --   --   --   --   --   --   --  39  --   --     < > = values in this interval not displayed.       Hepatic Studies    Recent Labs   Lab Test 11/15/20  0830 11/14/20  2041 11/12/20 10/16/20   BILITOTAL  --   0.7 0.5 0.6   DBIL  --  0.4*  --  0.0   ALKPHOS  --  184* 202.0* 139   PROTTOTAL  --  7.8 7.6 8.4   ALBUMIN  --  2.6* 3.7 4.3   AST  --  48* 65.0* 70   ALT  --  56 64* 88     --   --  322       Pancreatitis testing    Recent Labs   Lab Test 02/08/20  0408   TRIG 57       Lipid testing    Recent Labs   Lab Test 02/08/20  0408   CHOL 118   HDL 71   LDL 35   TRIG 57     Hematology Studies     Recent Labs   Lab Test 11/23/20  1100 11/19/20  0639 11/18/20  0540 11/17/20  0549 11/15/20  0830 11/15/20  0830 09/21/20  1438 09/21/20  1438   WBC 11.6* 13.7* 16.2* 17.1*   < > 19.9*   < > 7.2   ANEU  --   --   --   --   --  17.4*  --  5.3   ALYM  --   --   --   --   --  0.8  --  1.1   GIULIANO  --   --   --   --   --  1.2  --  0.7   AEOS  --   --   --   --   --  0.0  --  0.1   HGB 9.5* 9.5* 9.5* 9.6*   < > 10.0*   < > 10.9*   HCT 31.6* 31.9* 32.3* 32.4*   < > 33.6*   < > 36.8*   * 492* 530* 472*   < > 418   < > 315    < > = values in this interval not displayed.       Clotting Studies    Recent Labs   Lab Test 12/07/20 11/30/20 11/25/20 11/23/20 03/20/20  0530 03/20/20  0530   INR 2.9* 2.2* 2.0* 1.8*   < > 2.84*   PTT  --   --   --   --   --  59*    < > = values in this interval not displayed.       Iron Testing    Recent Labs   Lab Test 11/23/20  1100 11/16/20  0616 11/16/20  0616 02/08/20  0408 02/08/20  0408   IRON  --   --  16*  --  25*   FEB  --   --  261  --  306   IRONSAT  --   --  6*  --  8*   HEAVENLY  --   --  75  --  189   MCV 69.0*   < > 67*   < > 87   B12  --   --   --   --  752    < > = values in this interval not displayed.     Arterial Blood Gas Testing    Recent Labs   Lab Test 03/15/20  2235 03/06/20  1615 03/06/20  1550 02/26/20  1321 02/24/20  0345 02/24/20  0345 02/23/20  1953   PH 7.53* 7.41 7.39  --   --  7.46* 7.47*   PCO2 35 42 45  --   --  42 37   PO2 126* 96 49*  --   --  123* 81   HCO3 29* 26 27  --   --  30* 27   O2PER 21 65 65 21.0   < > 2L  2L 21    < > = values in this interval not  displayed.        Urine Studies     Recent Labs   Lab Test 11/17/20  0825 02/22/20  0944 02/13/20  0334 02/07/20 2029   URINEPH 5.5 5.5 6.0 5.0   NITRITE Negative Negative Negative Negative   LEUKEST Negative Small* Small* Negative   WBCU 0 2 81* 3       Medication levels    Recent Labs   Lab Test 02/16/20  0400 02/13/20  2147 02/13/20 2147   VANCOMYCIN 18.5   < >  --    TOBRA  --   --  13.0    < > = values in this interval not displayed.     Microbiology:  Last Culture results with specimen source  Culture Micro   Date Value Ref Range Status   11/17/2020 No growth  Final   11/17/2020 No growth  Final   11/16/2020 No growth  Final   11/16/2020 No growth  Final   11/16/2020 Moderate growth  Staphylococcus aureus   (A)  Final   11/16/2020 Moderate growth  Strain 2  Staphylococcus aureus   (A)  Final   11/15/2020 No growth  Final   11/15/2020 No growth  Final   11/14/2020 (A)  Final    Cultured on the 1st day of incubation:  Staphylococcus aureus     11/14/2020   Final    Critical Value/Significant Value, preliminary result only, called to and read back by  SHA ACE RN [id number] 11.15.20 NDP     11/14/2020   Final    (Note)  POSITIVE for STAPHYLOCOCCUS AUREUS and NEGATIVE for the mecA gene  (not MRSA) by [employer] multiplex nucleic acid test. The mecA gene was  not detected. Final identification and antimicrobial susceptibility  testing will be verified by standard methods.    Specimen tested with [employer] multiplex, gram-positive blood culture  nucleic acid test for the following targets: Staph aureus, Staph  epidermidis, Staph lugdunensis, other Staph species, Enterococcus  faecalis, Enterococcus faecium, Streptococcus species, S. agalactiae,  S. anginosus grp., S. pneumoniae, S. pyogenes, Listeria sp., mecA  (methicillin resistance) and Ernst/B (vancomycin resistance).    Critical Value/Significant Value called to and read back by Sha Ace Rn [id number] 11.15.20 NDP     11/14/2020 (A)  Final    Cultured on the 1st  day of incubation:  Staphylococcus aureus  Susceptibility testing done on previous specimen     11/14/2020   Final    Critical Value/Significant Value, preliminary result only, called to and read back by  PATRICIO SHANNON RN [id number] 11.15.20 NDP     09/21/2020 No growth  Final   09/21/2020 No growth  Final   03/13/2020 No growth  Final   03/13/2020 No growth  Final   03/03/2020 No growth  Final   03/03/2020 No growth  Final   02/22/2020 No growth  Final   02/22/2020 No growth  Final   02/16/2020 No growth  Final   02/16/2020 No growth  Final   02/15/2020 No growth  Final   02/15/2020 (A)  Final    Cultured on the 2nd day of incubation:  Staphylococcus epidermidis     02/15/2020   Final    Critical Value/Significant Value, preliminary result only, called to and read back by  Cee Benavidez RN on 2.16.20 at 2220.  JRT     02/15/2020   Final    (Note)  POSITIVE for STAPHYLOCOCCUS EPIDERMIDIS and POSITIVE for the mecA  gene (resistant to methicillin) by [employer] multiplex nucleic acid  test. Final identification and antimicrobial susceptibility testing  will be verified by standard methods.    Specimen tested with Verigene multiplex, gram-positive blood culture  nucleic acid test for the following targets: Staph aureus, Staph  epidermidis, Staph lugdunensis, other Staph species, Enterococcus  faecalis, Enterococcus faecium, Streptococcus species, S. agalactiae,  S. anginosus grp., S. pneumoniae, S. pyogenes, Listeria sp., mecA  (methicillin resistance) and Ernst/B (vancomycin resistance).    Critical Value/Significant Value called to and read back by Cee Benavidez RN, 2.17.20 @ 0207 pt.     02/14/2020 No growth  Final   02/13/2020 (A)  Final    Cultured on the 1st day of incubation:  Proteus mirabilis  Susceptibility testing done on previous specimen     02/13/2020   Final    Critical Value/Significant Value, preliminary result only, called to and read back by  Mima Cabrera RN. @1426. 2.13.20. BS.      02/13/2020  (A)  Final    Cultured on the 1st day of incubation:  Enterococcus faecalis  Susceptibility testing done on previous specimen     02/13/2020   Final    Critical Value/Significant Value, preliminary result only, called to and read back by  Alisson Castillo RN on 2.13.20 at 1728.  T     02/13/2020   Final    (Note)  POSITIVE for ENTEROCOCCUS FAECALIS and NEGATIVE for Ernst/vanB genes  by [employer] multiplex nucleic acid test. Final identification and  antimicrobial susceptibility testing will be verified by standard  methods.    Specimen tested with Verigene multiplex, gram-positive blood culture  nucleic acid test for the following targets: Staph aureus, Staph  epidermidis, Staph lugdunensis, other Staph species, Enterococcus  faecalis, Enterococcus faecium, Streptococcus species, S. agalactiae,  S. anginosus grp., S. pneumoniae, S. pyogenes, Listeria sp., mecA  (methicillin resistance) and Ernst/B (vancomycin resistance).    Critical Value/Significant Value called to and read back by MARIA EUGENIA MILLAN RN 2/13/20 2036          02/13/2020 (A)  Final    Cultured on the 1st day of incubation:  Proteus mirabilis     02/13/2020   Final    Critical Value/Significant Value, preliminary result only, called to and read back by  Mima Cabrera RN. @1426. 2.13.20. BS.      02/13/2020 (A)  Final    Cultured on the 1st day of incubation:  Enterococcus faecalis     02/13/2020   Final    Critical Value/Significant Value, preliminary result only, called to and read back by  Alisson Leon RN on 2.13.20 at 1728.  T     02/13/2020   Final    (Note)  POSITIVE for PROTEUS SPECIES by [employer] multiplex nucleic acid test.  Final identification and antimicrobial susceptibility testing will be  verified by standard methods.    Specimen tested with Verigene multiplex, gram-negative blood culture  nucleic acid test for the following targets: Acinetobacter sp.,  Citrobacter sp., Enterobacter sp., Proteus sp., E. coli,  K.  pneumoniae/oxytoca, P. aeruginosa, and the following resistance  markers: CTXM, KPC, NDM, VIM, IMP and OXA.    Critical Value/Significant Value called to and read back by  Alisson Leon RN @ 1650. 2/13/20. AV     02/13/2020 No growth  Final    Specimen Description   Date Value Ref Range Status   11/17/2020 Blood Right Arm  Final   11/17/2020 Blood Left Arm  Final   11/16/2020 Blood Left Hand  Final   11/16/2020 Blood Right Arm  Final   11/16/2020 Wound Drainage  Final   11/16/2020 Wound Drainage  Final   11/15/2020 Blood Right Hand  Final   11/15/2020 Blood Left Arm  Final   11/15/2020 Feces  Final   11/14/2020 Blood Left Hand  Final   11/14/2020 Blood Left Arm  Final   09/21/2020 Blood Left Arm  Final   09/21/2020 Blood Right Arm  Final   03/13/2020 Blood Left Hand  Final   03/13/2020 Blood Right Hand  Final   03/03/2020 Blood Left Hand  Final   03/03/2020 Blood Right Arm  Final   02/22/2020 Blood Right Hand  Final   02/22/2020 Blood Right Hand  Final   02/19/2020 Nares  Final   02/16/2020 Blood Right Hand  Final   02/16/2020 Blood Left Hand  Final   02/15/2020 Blood Right Hand  Final   02/15/2020 Blood Left Hand  Final   02/14/2020 Blood Right Hand  Final   02/13/2020 Blood Left Hand  Final   02/13/2020 Blood Right Hand  Final   02/13/2020 Nasopharyngeal  Final   02/13/2020 Catheterized Urine  Final        Last check of C difficile  C Diff Toxin B PCR   Date Value Ref Range Status   11/15/2020 Negative NEG^Negative Final     Comment:     Negative: C. difficile target DNA sequences NOT detected, presumed negative   for C.difficile toxin B or the number of bacteria present may be below the   limit of detection for the test.  FDA approved assay performed using [employer] GeneXpert real-time PCR.  A negative result does not exclude actual disease due to C. difficile and may   be due to improper collection, handling and storage of the specimen or the   number of organisms in the specimen is below the detection  limit of the assay.         Virology:  Respiratory virus testing    Recent Labs   Lab Test 11/14/20  2140 02/13/20  0334   INFZA  --  Negative   INFZB  --  Negative   IRSV  --  Negative   [id number] Nasopharyngeal  --    SARSCOVRES NEGATIVE  --      Hepatitis B Testing     Recent Labs   Lab Test 02/12/20  0440 02/08/20  0408   AUSAB 0.69 1.99   HBCAB Nonreactive Nonreactive   HEPBANG Nonreactive Nonreactive     Hepatitis C Antibody   Date Value Ref Range Status   02/12/2020 Nonreactive NR^Nonreactive Final     Comment:     Assay performance characteristics have not been established for newborns,   infants, and children     02/08/2020 Nonreactive NR^Nonreactive Final     Comment:     Assay performance characteristics have not been established for newborns,   infants, and children         CMV Antibody IgG   Date Value Ref Range Status   02/12/2020 >8.0 (H) 0.0 - 0.8 AI Final     Comment:     Positive  Antibody index (AI) values reflect qualitative changes in antibody   concentration that cannot be directly associated with clinical condition or   disease state.     02/08/2020 7.8 (H) 0.0 - 0.8 AI Final     Comment:     Positive  Antibody index (AI) values reflect qualitative changes in antibody   concentration that cannot be directly associated with clinical condition or   disease state.       Varicella Zoster Virus Antibody IgG   Date Value Ref Range Status   02/12/2020 2.0 (H) 0.0 - 0.8 AI Final     Comment:     Positive, suggests prev. exposure and probable immunity  Antibody index (AI) values reflect qualitative changes in antibody   concentration that cannot be directly associated with clinical condition or   disease state.     02/08/2020 2.4 (H) 0.0 - 0.8 AI Final     Comment:     Positive, suggests prev. exposure and probable immunity  Antibody index (AI) values reflect qualitative changes in antibody   concentration that cannot be directly associated with clinical condition or   disease state.       Toxoplasma  Antibody IgG   Date Value Ref Range Status   02/12/2020 <3.0 0.0 - 7.1 IU/mL Final     Comment:     Negative- Absence of detectable Toxoplasma gondii IgG antibodies. A negative   result does not rule out acute infection.  The magnitude of the measured result is not indicative of the amount of   antibody present. The concentrations of anti-Toxoplasma gondii IgG in a given   specimen determined with assays from different manufacturers can vary due to   differences in assay methods and reagent specificity.     02/08/2020 <3.0 0.0 - 7.1 IU/mL Final     Comment:     Negative- Absence of detectable Toxoplasma gondii IgG antibodies. A negative   result does not rule out acute infection.  The magnitude of the measured result is not indicative of the amount of   antibody present. The concentrations of anti-Toxoplasma gondii IgG in a given   specimen determined with assays from different manufacturers can vary due to   differences in assay methods and reagent specificity.       Imaging:  Results for orders placed or performed during the hospital encounter of 11/14/20   US Abdomen Complete    Narrative    EXAMINATION: US ABDOMEN COMPLETE, 11/15/2020 9:20 AM     COMPARISON: 2/8/2020    HISTORY: Concern for abscess, patient unable to get CT scan due to  elevated creatinine    TECHNIQUE: The abdomen was scanned in standard fashion with  specialized ultrasound transducer(s) using both gray-scale and limited  color Doppler techniques.    FINDINGS:    Liver: The liver demonstrates normal homogeneous echotexture. The  liver measures 19.7 cm in craniocaudal dimension. No evidence of a  focal hepatic mass. The main portal vein is patent with antegrade  flow. Gallbladder: There is no wall thickening, positive sonographic  Yoon's sign or evidence for cholelithiasis.    Bile Ducts: Both the intra- and extrahepatic biliary system are of  normal caliber. The common bile duct measures 3.7 mm in diameter.    Pancreas: Not well  visualized.    Kidneys: Both kidneys are of normal echotexture, without mass or  hydronephrosis. The craniocaudal dimensions are: right- 10.5, left-  11.5.    Spleen: The spleen is normal in size, measuring 11.1 in sagittal  dimension.    Aorta and IVC: The visualized portions of the aorta and IVC are  unremarkable. The proximal aorta measures 2.7 cm in diameter and the  IVC measures 2.4 cm in diameter.    Fluid: Small volume of ascites. Partially imaged right pleural  effusion.        Impression    IMPRESSION:   1. Hepatomegaly with small volume ascites. No intra-abdominal abscess  visualized. If there is continued clinical concern for abscess,  consider noncontrast CT scan of the abdomen/pelvis.  2. Partially imaged right pleural effusion, likely trace.    I have personally reviewed the examination and initial interpretation  and I agree with the findings.    DEION FLANAGAN MD   CT Abdomen Pelvis w/o Contrast    Narrative    EXAMINATION: CT ABDOMEN PELVIS W/O CONTRAST, 11/15/2020 2:53 PM    TECHNIQUE:  Helical CT images from the lung bases through the pubic  symphysis were obtained  without IV contrast.     COMPARISON: Same-day ultrasound, CT 2/8/2020    HISTORY: Abd pain, fever, abscess suspected; Patient with VAD and  driveline infection (S. aureus infection/MSSA) and now complicated  with bacteremia, please evaluate for abscesses/intraabdominal  infectious process/cutaneous-soft tissues skin infections    FINDINGS:    Abdomen and pelvis:     Homogenous hepatic parenchyma, without focal liver lesion. No  calcified gallstones. No intra or extrahepatic biliary dilatation.  Moderate fatty replacement of the pancreatic parenchyma, without focal  pancreatic lesion or ductal dilatation. Unremarkable spleen. Mild  thickening of the adrenal glands bilaterally, without focal nodule,  similar to prior. Unremarkable noncontrast appearance of the kidneys,  without hydronephrosis or nephrolithiasis. The urinary bladder  is  distended and otherwise unremarkable. Unremarkable prostate and  seminal vesicles.    No abnormally dilated loop of small or large bowel. Small to moderate  volume simple ascites within the abdomen and pelvis, slightly  increased in comparison to CT performed in February. No defined fluid  collection is noted to suggest abscess. No intraperitoneal free air is  noted. Small fat-containing umbilical and left inguinal hernias.  Vascular patency cannot be assessed on these noncontrast images,  however there is no evidence of infrarenal abdominal aortic aneurysm.  No pathologically enlarged thoracic lymph nodes.    Lung bases:  Partially visualized postsurgical changes of LVAD  placement. Patency cannot be assessed on these noncontrast images. No  air or fluid collection along the drive line. Trace bilateral pleural  effusions and atelectatic changes, otherwise the lung bases are clear.  Stable cardiomegaly. Small sliding type hiatal hernia.    Bones and soft tissues: No acute or aggressive appearing osseous  lesion. Stepwise grade 1 anterolisthesis of L3 on L4, and L4 on L5,  with associated loss of intervertebral disc space at each level.  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 Mild diffuse body  wall edema.      Impression    IMPRESSION:   1. Small to moderate volume simple intra-abdominal ascites, slightly  increased in volume as compared to CT performed on 2/8/2020,  compatible with third spacing of fluid. No defined fluid collection is  identified to suggest abscess. No additional acute findings within the  abdomen/pelvis.  2. Trace bilateral pleural effusions, and associated bibasilar  atelectasis. The lung bases are otherwise clear.  3. Postprocedural changes of LVAD placement. The components are  unremarkable on these noncontrast images, without associated air or  inflammatory stranding to suggest drive line infection.     I have personally reviewed the examination and initial interpretation  and I  agree with the findings.    OSITO BRAVO MD   US Renal Complete    Narrative    EXAMINATION: US RENAL COMPLETE, 2020 1:43 PM     COMPARISON: None.    HISTORY: Rule out obstruction    TECHNIQUE: The kidneys and bladder were scanned in the standard  fashion with specialized ultrasound transducer(s) using both gray  scale and limited color/spectral Doppler techniques.    FINDINGS:    Right kidney: Measures 10.5 cm in length. Parenchyma is of normal  thickness and echogenicity. No focal mass. No hydronephrosis.    Left kidney: Measures 11.6 cm in length. Parenchyma is of normal  thickness and echogenicity. No focal mass. No hydronephrosis.     Bladder: Decompressed with Guevara catheter.      Impression    IMPRESSION:  1.  Normal renal ultrasound study.    OSITO BRAVO MD   XR Chest Port 1 View    Narrative    Exam: XR CHEST PORT 1 VW, 2020 2:19 PM    Indication: PICC Placement    Comparison: 2020    Findings:   Right upper extremity PICC tip at the mid to low SVC. Left chest wall  single lead automatic implantable cardiac defibrillator. Stable LVAD.  Stable enlarged cardiac silhouette. The pulmonary vasculature is  within normal limits. No pleural effusion or pneumothorax. No focal  airspace opacity. Low lung volumes.      Impression    Impression: Right upper extremity PICC tip at the mid to low SVC.    CHARLES HERNANDEZ DO   Echo Complete    Narrative    [id number]  [id number]  [id number]  [id number]^PHYLLIS NAIR^CONNIE^YODIT           [facility],[place]  Echocardiography Laboratory  [address] [place] [id number]     Name: WOLFGANG LEACH  MRN: [id number]  : 1955  Study Date: 2020 10:56 AM  Age: 65 yrs  Gender: Male  [place] [facility] [place]  Reason For Study: Endocarditis  Ordering Physician: CONNIE JIN  Performed By: Nelly Monte RDCS     BSA: 2.1 m2  Height: 67 in  Weight: 220 lb  HR: 118  BP: 97/67  mmHg  _____________________________________________________________________________  __        Procedure  LVAD Echocardiogram with two-dimensional, color and spectral Doppler  performed. Technically difficult study.Extremely poor acoustic windows.  Limited information was obtained during study.  _____________________________________________________________________________  __        Interpretation Summary  Technically difficult study.Extremely poor acoustic windows.  Limited information was obtained during study.  LVAD cannula was seen in the expected anatomic position in the LV apex.  HM3 at 5500RPM.  LVIDd 48mm.  Septum probably normal.  Aortic valve cannot be assessed.  Flow velocities not obtained.  Dilation of the inferior vena cava is present with abnormal respiratory  variation in diameter.  No pericardial effusion is present.  _____________________________________________________________________________  __        Left Ventricle  The Ejection Fraction is estimated at <20%. LVAD cannula was seen in the  expected anatomic position in the LV apex. HM3 at 5500RPM.  LVIDd 48mm.  Septum probably normal.  Aortic valve cannot be assessed.  Flow velocities not obtained.     Right Ventricle  Right ventricular function cannot be assessed due to poor image quality.     Vessels  Dilation of the inferior vena cava is present with abnormal respiratory  variation in diameter.     Pericardium  No pericardial effusion is present.     _____________________________________________________________________________  __  MMode/2D Measurements & Calculations  IVSd: 0.92 cm  LVIDd: 4.8 cm  LVIDs: 4.6 cm  LVPWd: 0.88 cm     FS: 4.6 %  LV mass(C)d: 147.6 grams  LV mass(C)dI: 70.1 grams/m2  asc Aorta Diam: 3.5 cm  RWT: 0.37           _____________________________________________________________________________  __           Report approved by: Graciela Tomlinson 11/16/2020 11:39 AM      Transesophageal Echocardiogram    Narrative     [id number]  [id number]  [id number]  [id number]^GLORIA^ANIKA           [facility],[place]  Echocardiography Laboratory  [address] [place] [id number]        Name: WOLFGANG LEACH  MRN: [id number]  : 1955  Study Date: 2020 09:03 AM  Age: 65 yrs  Gender: Male  [place] [facility] [place]  Reason For Study: Bacteremia  History: Sepsis  Ordering Physician: ANIKA CASTRO  Performed By: Maciel House     BSA: 2.1 m2  Height: 67 in  Weight: 210 lb  HR: 116  BP: 69/39 mmHg  Attestation  I was present during CHAMP probe placement by the fellow, Maciel House. I  personally viewed the imaging and agree with the interpretation and report as  documented by the fellow.  _____________________________________________________________________________  __     Interpretation Summary  S/P HM 3 LVAD. LVAD inflow cannula with normal doppler.     No vegetations on valves or ICD wire.     _____________________________________________________________________________  __        Procedure  Transesophageal Echocardiogram with color and spectral Doppler performed.  Procedure location Echo Lab. The procedure was performed in the Echo Lab. CHAMP  Probe #58 was used during the procedure. Patient was sedated using Fentanyl  100 mcg. Patient was sedated using Versed 2 mg. The heart rate, respiratory  rate, oxygen saturations, blood pressure, and response to care were monitored  throughout the procedure with the assistance of the nurse. I determined this  patient to be an appropriate candidate for the planned sedation and procedure  and have reassessed the patient immediately prior to sedation and procedure.  Total sedation time: 34 minutes of continuous bedside 1:1 monitoring. The  Transducer was inserted without difficulty . The patient tolerated the  procedure well. Complications None. Informed consent for Transesophegeal echo  obtained. The patient's rhythm is paced. Good quality two-dimensional  was  performed and interpreted. Good quality color and spectral Doppler were  performed and interpreted.     Left Ventricle  Mild left ventricular dilation is present. Severely (EF 10-20%) reduced left  ventricular function is present. LVAD cannula was seen in the expected  anatomic position in the LV apex. S/P HM 3 LVAD 5500 RPM. Left ventricular  wall thickness is normal.     Right Ventricle  Moderate right ventricular dilation is present. Global right ventricular  function is moderately reduced. A pacemaker lead is noted in the right  ventricle. No vegetations seen on Single lead ICD wire.     Atria  The left atrial appendage is normal. It is free of spontaneous echo contrast  and thrombus. Severe biatrial enlargement is present. The atrial septum is  intact as assessed by color Doppler .        Mitral Valve  Mitral leaflet thickness is normal. Mild to moderate mitral insufficiency is  present.     Aortic Valve  The aortic valve is tricuspid. The aortic valve remains closed with every  beat. Mild aortic valve sclerosis is present. Mild aortic insufficiency is  present.     Tricuspid Valve  The tricuspid valve is normal. Mild to moderate tricuspid insufficiency is  present.     Pulmonic Valve  The pulmonic valve is normal. Trace pulmonic insufficiency is present.     Vessels  The thoracic aorta is normal. Ascending aorta 2.9 cm.        Pericardium  No pericardial effusion is present.     Compared to Previous Study  This study was compared with the study from 11/16/2020 . There has been no  change.     _____________________________________________________________________________  __                       Report approved by: Graciela Dubose 11/19/2020 11:27 AM           _____________________________________________________________________________  __

## 2020-12-11 NOTE — PROGRESS NOTES
[facility]  General Infectious Disease Clinic Note:  Hospital follow up     Patient:  Eliseo Tanner, Date of birth 1955, Medical record number [id number]  Date of Visit:  12/11/2020         Assessment and Recommendations:   Assessment:  65 y/o male with chronic systolic HF and NICM s/p HM 3 on 2/18/2020 c/b polymicrobial bacteremia (P mirabilis and E faecalis), ICD placement, ABHINAV on CPAP, type II DM, CKD stage III who presented in November 2020 with complicated MSSA bacteremia 2/2 drive line infection.      1. Complicated MSSA bacteremia w/ associated MSSA LVAD drive line infection: developed pain around exit site followed by drainage~ 11/1. Initially started on doxycycline 11/5 but switched to tmp/smx when the culture revealed MSSA resistant to doxy-subsequently switched to tmp/smx 1 S.S. BID. Blood cultures were initially negative but 11/12 and 11/14 were positive for MSSA. Blood cultures cleared 11/15.  Repeat LVAD drainage cultures grew MSSA again. CT A/P was negative for a deeper drive line infection. TTE was poor quality and the valves were not well visualized. CHAMP negative for vegetations on valves or ICD leads. Currently on cefazolin 2 grams IV q 8 hours. He is doing well with the injections at home. Planning for a 6 week course followed by suppression with cephalexin. Drainage around LVAD site has improved significantly. No other systemic signs of infection.    2. Acute on chronic BERNARDA: discussed w/ cardiology. Cr and BUN elevated-they will adjust the diuretic doses. Dose reduced the cefazolin because of drop in crcl ~ 27.     Recommendations:  1. Decrease cefazolin to 2 grams IV q 12 hours (crcl ~ 27). Will monitor crcl to see if the dose needs to be adjusted in the future.  2. Duration of therapy: 6 weeks from 11/15/20 to 12/27/20. After he completes IV will transition him to cephalexin.   3. After he stops IV cefazolin he should start cephalexin 500 mg po q 12  hours. Order placed w/ a start date of 12/28/20.   4. I spoke with Dr. Doe and he will work with coordinator to adjust diuretics (BUN and Creatinine elevated)  5. Repeat CMP, CBC w/ diff and CRP on Monday 12/14  6. Please alert me to labs from Monday 12/14 and weekly labs    Negar Bhatti DO  Pager [phone]         History of the Infectious Disease lllness:     Discharged from hospital 11/19. Has been on Cefazolin and is receiving 2 grams q 8 hours (6:30am, 2 pm, 10 pm). Wife does injections. PICC line working okay. Every Monday has dressing changed. Antibiotic deliveries have been okay.   No fever, chills. Continues to have some drainage around the VAD but has significantly improved. Wife does dressing changes and has not noted purulent drainage. He did have a   bump above the drive line that hurt with palpation but it now is improved. Describes it as a solid puffy area w/o no drainage.   VAD position okay has been okay-no significant manipulation.     Fluid status okay. Weight 203# --> 208# ~ 4 days ago-> 203#. Diuretics changed during his hospitalization.  Breathing okay. Occasional cough but non-productive. Creatinine and BUN increased.    Review of Systems:  CONSTITUTIONAL:  No fevers or chills. No night sweats.  RESPIRATORY:  + intermittent non-productive cough, no sputum or dyspnea  CARDIOVASCULAR:  negative for chest pain, heart palpitations  GASTROINTESTINAL:  negative for nausea, vomiting, diarrhea or constipation  HEME:  No easy bruising or bleeding  INTEGUMENT:  Drainage around LVAD site improved. PICC line intact w/ occlusive dressing.    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)      LVAD (left ventricular assist device) present (H)        Past Surgical History:   Procedure Laterality Date      ANESTHESIA CARDIOVERSION N/A 2020    Procedure: ANESTHESIA, FOR CARDIOVERSION;  Surgeon: GENERIC ANESTHESIA PROVIDER;  Location: [facility] OR     CV CENTRAL VENOUS CATHETER PLACEMENT N/A 2020    Procedure: Central Venous Catheter Placement;  Surgeon: Chente Moss MD;  Location:  HEART CARDIAC CATH LAB     CV INTRA-AORTIC BALLOON PUMP INSERTION N/A 2020    Procedure: Intra-Aortic Balloon Pump Insertion;  Surgeon: Jose Baldwin MD;  Location:  HEART CARDIAC CATH LAB     CV INTRA-AORTIC BALLOON PUMP INSERTION N/A 2020    Procedure: Intra-Aortic Balloon Pump Insertion;  Surgeon: Chente Moss MD;  Location:  HEART CARDIAC CATH LAB     CV RIGHT HEART CATH N/A 2020    Procedure: CV RIGHT HEART CATH;  Surgeon: Dalton Baeza MD;  Location:  HEART CARDIAC CATH LAB     CV SWAN LUCIANA PROCEDURE N/A 2020    Procedure: Franklin Luciana Procedure;  Surgeon: Chente Moss MD;  Location:  HEART CARDIAC CATH LAB     EP ICD Bilateral 3/16/2020    Procedure: EP ICD;  Surgeon: Dali Day MD;  Location:  HEART CARDIAC CATH LAB     INSERT VENTRICULAR ASSIST DEVICE LEFT (HEARTMATE II) N/A 2020    Procedure: INSERTION, LEFT VENTRICULAR ASSIST DEVICE (HEARTMATE III);  Surgeon: Mac Jaramillo MD;  Location:  OR     THORACOSCOPY Right 3/6/2020    Procedure: RIGHT VIDEO-ASSISTED THORASCOPIC SURGERY, EVACUATION OF HEMOTHORAX, PLACEMENT OF CHEST TUBES;  Surgeon: William Gan MD;  Location:  OR       No family history on file.    Social History     Social History Narrative     Not on file     Social History     Tobacco Use     Smoking status: Former Smoker     Quit date: 1994     Years since quittin.7     Smokeless tobacco: Never Used   Substance Use Topics     Alcohol use: None     Drug use: None       Immunization History   Administered Date(s) Administered     Flu, Unspecified 11/15/2019     Influenza (High Dose) 3 valent  vaccine 10/25/2019     Pneumo Conj 13-V (2010&after) 09/06/2018       Patient Active Problem List   Diagnosis     Acute on chronic systolic congestive heart failure (H)     Anxiety     CKD (chronic kidney disease) stage 3, GFR 30-59 ml/min     Coronary artery disease involving native coronary artery of native heart without angina pectoris     GERD (gastroesophageal reflux disease)     Gout     Hyperlipidemia with target LDL less than 100     Nonischemic cardiomyopathy (H)     Non-nephrotic range proteinuria     ABHINAV on CPAP     Type 2 diabetes mellitus with stage 3 chronic kidney disease, without long-term current use of insulin (H)     Heart failure (H)     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)     Cardiac arrest (H)     LVAD (left ventricular assist device) present (H)     Chronic systolic congestive heart failure (H)     CKD (chronic kidney disease) stage 4, GFR 15-29 ml/min (H)     Bacteremia     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)     Paroxysmal atrial fibrillation (H)       Outpatient Medications Marked as Taking for the 12/11/20 encounter (Virtual Visit) with Negar Bhatti DO   Medication Sig     acetaminophen (TYLENOL) 325 MG tablet Take 2 tablets (650 mg) by mouth every 4 hours as needed for mild pain or fever     albuterol (PROAIR HFA/PROVENTIL HFA/VENTOLIN HFA) 108 (90 Base) MCG/ACT inhaler Inhale 2 puffs into the lungs every 6 hours as needed for wheezing     allopurinol (ZYLOPRIM) 100 MG tablet 2 tablets (200 mg) by Oral or Feeding Tube route daily     amiodarone (PACERONE) 200 MG tablet Take 1 tablet (200 mg) by mouth daily     aspirin (ASA) 81 MG EC tablet Take 1 tablet (81 mg) by mouth daily     atorvastatin (LIPITOR) 20 MG tablet Take 20 mg by mouth daily     bisacodyl (DULCOLAX) 10 MG suppository Place 1 suppository (10 mg) rectally daily as needed for constipation     bumetanide (BUMEX) 2 MG tablet Take 1 tablet (2 mg) by mouth 2  times daily     co-enzyme Q-10 200 MG CAPS 200 mg by Oral or Feeding Tube route daily     FLUoxetine (PROZAC) 20 MG capsule Take 1 capsule (20 mg) by mouth daily     hydrALAZINE (APRESOLINE) 25 MG tablet Take 4 tablets (100 mg) by mouth 3 times daily     insulin glargine (BASAGLAR KWIKPEN) 100 UNIT/ML pen Inject 12 Units Subcutaneous At Bedtime     insulin pen needle (BD JAIME U/F) 32G X 4 MM miscellaneous      lisinopril (ZESTRIL) 10 MG tablet Take 1 tablet (10 mg) by mouth daily     magnesium oxide (MAG-OX) 400 MG tablet 1 tablet (400 mg) by Oral or Feeding Tube route daily     multivitamin w/minerals (THERA-VIT-M) tablet Take 1 tablet by mouth daily     nitroGLYcerin (NITROSTAT) 0.4 MG sublingual tablet For chest pain place 1 tablet under the tongue every 5 minutes for 3 doses. If symptoms persist 5 minutes after 1st dose call 911.     omeprazole (PRILOSEC) 20 MG DR capsule Take 20 mg by mouth daily     polyethylene glycol (MIRALAX) 17 g packet Take 17 g by mouth daily     potassium chloride ER (KLOR-CON M) 20 MEQ CR tablet Take 20 mEq by mouth daily     senna-docusate (SENOKOT-S/PERICOLACE) 8.6-50 MG tablet Take 1 tablet by mouth 2 times daily     tamsulosin (FLOMAX) 0.4 MG capsule Take 1 capsule (0.4 mg) by mouth daily This med helps with urinary retention     warfarin ANTICOAGULANT (COUMADIN) 3 MG tablet Take 1 tablet (3 mg) by mouth daily (Patient taking differently: Take 4 mg by mouth daily )     zolpidem (AMBIEN) 5 MG tablet Take 5 mg by mouth nightly as needed for Insomnia       Allergies   Allergen Reactions     Heparin      HIT screen positive 2/14/20, reflex DAVINA negative; however heme recommended treating as if positive  HIT screen negative 2/11/20     Oxycodone Itching and Other (See Comments)     Chlorhexidine Rash              Physical Exam:   Vitals were reviewed.  All vitals stable  There were no vitals taken for this visit.  Wt Readings from Last 4 Encounters:   11/18/20 95.3 kg (210 lb 1.6 oz)    11/12/20 98.9 kg (218 lb)   09/21/20 98 kg (216 lb)   09/21/20 98 kg (216 lb)       Exam:  GENERAL: well-developed, well-nourished, alert, oriented, in no acute distress.  HEAD: Head is normocephalic, atraumatic   EYES: Eyes have anicteric sclerae.    ENT: Oropharynx is moist without exudates or ulcers.  NECK: Supple.  LUNGS: Clear to auscultation.  CARDIOVASCULAR: Regular rate and rhythm with no murmurs, gallops or rubs.  ABDOMEN: Normal bowel sounds, soft, nontender.  SKIN: No acute rashes. Line is in place without any surrounding erythema.  NEUROLOGIC: Grossly nonfocal.         Laboratory Data:   Inflammatory Markers    Recent Labs   Lab Test 11/05/20 09/21/20  1438 03/04/20  0757 02/08/20  0408   CRP 54.9 6.6 77.0* 21.0*     Metabolic Studies    Recent Labs   Lab Test 12/11/20  0848 11/23/20  1100 11/19/20  0639 11/19/20  0639 11/15/20  0541 11/15/20  0541 09/21/20  1440 09/21/20  1440 03/06/20  0322 03/06/20  0322 02/13/20  0206 02/13/20  0206 02/08/20  0408 02/08/20  0408    136*  --  134   < > 133   < >  --    < > 135   < > 132*   < > 134   POTASSIUM 4.4 3.9   < > 3.4   < > 4.4   < >  --    < > 4.1   < > 4.1  4.2   < > 3.5   CHLORIDE 98.0 97.0*  --  98   < > 102   < >  --    < > 104   < > 96   < > 103   CO2 28.0 28.0  --  32   < > 21   < >  --    < > 30   < > 22   < > 23   ANIONGAP 11 11  --  5   < > 10   < >  --    < > 1*   < > 14   < > 8   BUN 78*  31 41*  21   < > 40*   < > 31*   < >  --    < > 16   < > 34*   < > 50*   CR 2.5* 2.0*  --  1.56*   < > 1.78*   < >  --    < > 1.02   < > 1.58*   < > 2.81*   GFRESTIMATED 28q* 36  --  45*   < > 39*   < >  --    < > 76   < > 45*   < > 22*   * 202*  --  119*   < > 143*   < >  --    < > 149*   < > 154*   < > 155*   CALOS 9.2 8.5  --  8.2*   < > 8.5   < >  --    < > 8.0*   < > 8.9   < > 8.2*   PHOS  --   --   --   --   --   --   --   --   --  3.8   < > 5.4*   < > 5.0*   MAG  --   --   --   --   --  2.0  --   --    < > 2.2   < > 2.0   < >  --    URIC   --   --   --   --   --   --   --   --   --   --   --   --   --  7.5*   LACT  --   --   --   --   --   --   --  1.3   < >  --    < > 9.5*   < > 0.6*   CKT  --   --   --   --   --   --   --   --   --   --   --  39  --   --     < > = values in this interval not displayed.       Hepatic Studies    Recent Labs   Lab Test 11/15/20  0830 11/14/20 2041 11/12/20 10/16/20   BILITOTAL  --  0.7 0.5 0.6   DBIL  --  0.4*  --  0.0   ALKPHOS  --  184* 202.0* 139   PROTTOTAL  --  7.8 7.6 8.4   ALBUMIN  --  2.6* 3.7 4.3   AST  --  48* 65.0* 70   ALT  --  56 64* 88     --   --  322       Pancreatitis testing    Recent Labs   Lab Test 02/08/20  0408   TRIG 57       Lipid testing    Recent Labs   Lab Test 02/08/20  0408   CHOL 118   HDL 71   LDL 35   TRIG 57     Hematology Studies     Recent Labs   Lab Test 11/23/20  1100 11/19/20  0639 11/18/20  0540 11/17/20  0549 11/15/20  0830 11/15/20  0830 09/21/20  1438 09/21/20  1438   WBC 11.6* 13.7* 16.2* 17.1*   < > 19.9*   < > 7.2   ANEU  --   --   --   --   --  17.4*  --  5.3   ALYM  --   --   --   --   --  0.8  --  1.1   GIULIANO  --   --   --   --   --  1.2  --  0.7   AEOS  --   --   --   --   --  0.0  --  0.1   HGB 9.5* 9.5* 9.5* 9.6*   < > 10.0*   < > 10.9*   HCT 31.6* 31.9* 32.3* 32.4*   < > 33.6*   < > 36.8*   * 492* 530* 472*   < > 418   < > 315    < > = values in this interval not displayed.       Clotting Studies    Recent Labs   Lab Test 12/07/20 11/30/20 11/25/20 11/23/20 03/20/20  0530 03/20/20  0530   INR 2.9* 2.2* 2.0* 1.8*   < > 2.84*   PTT  --   --   --   --   --  59*    < > = values in this interval not displayed.       Iron Testing    Recent Labs   Lab Test 11/23/20  1100 11/16/20  0616 11/16/20  0616 02/08/20  0408 02/08/20  0408   IRON  --   --  16*  --  25*   FEB  --   --  261  --  306   IRONSAT  --   --  6*  --  8*   HEAVENLY  --   --  75  --  189   MCV 69.0*   < > 67*   < > 87   B12  --   --   --   --  752    < > = values in this interval not displayed.      Arterial Blood Gas Testing    Recent Labs   Lab Test 03/15/20  2235 03/06/20  1615 03/06/20  1550 02/26/20  1321 02/24/20  0345 02/24/20  0345 02/23/20  1955   PH 7.53* 7.41 7.39  --   --  7.46* 7.47*   PCO2 35 42 45  --   --  42 37   PO2 126* 96 49*  --   --  123* 81   HCO3 29* 26 27  --   --  30* 27   O2PER 21 65 65 21.0   < > 2L  2L 21    < > = values in this interval not displayed.        Urine Studies     Recent Labs   Lab Test 11/17/20  0825 02/22/20  0944 02/13/20  0334 02/07/20 2029   URINEPH 5.5 5.5 6.0 5.0   NITRITE Negative Negative Negative Negative   LEUKEST Negative Small* Small* Negative   WBCU 0 2 81* 3       Medication levels    Recent Labs   Lab Test 02/16/20  0400 02/13/20  2147 02/13/20  2147   VANCOMYCIN 18.5   < >  --    TOBRA  --   --  13.0    < > = values in this interval not displayed.     Microbiology:  Last Culture results with specimen source  Culture Micro   Date Value Ref Range Status   11/17/2020 No growth  Final   11/17/2020 No growth  Final   11/16/2020 No growth  Final   11/16/2020 No growth  Final   11/16/2020 Moderate growth  Staphylococcus aureus   (A)  Final   11/16/2020 Moderate growth  Strain 2  Staphylococcus aureus   (A)  Final   11/15/2020 No growth  Final   11/15/2020 No growth  Final   11/14/2020 (A)  Final    Cultured on the 1st day of incubation:  Staphylococcus aureus     11/14/2020   Final    Critical Value/Significant Value, preliminary result only, called to and read back by  ROSA ACE, KRISTEN 1553 11.15.20 ND     11/14/2020   Final    (Note)  POSITIVE for STAPHYLOCOCCUS AUREUS and NEGATIVE for the mecA gene  (not MRSA) by [employer] multiplex nucleic acid test. The mecA gene was  not detected. Final identification and antimicrobial susceptibility  testing will be verified by standard methods.    Specimen tested with Verigene multiplex, gram-positive blood culture  nucleic acid test for the following targets: Staph aureus, Staph  epidermidis, Staph lugdunensis,  other Staph species, Enterococcus  faecalis, Enterococcus faecium, Streptococcus species, S. agalactiae,  S. anginosus grp., S. pneumoniae, S. pyogenes, Listeria sp., mecA  (methicillin resistance) and Ernst/B (vancomycin resistance).    Critical Value/Significant Value called to and read back by Sha Fontenot, Sarita 1835 11.15.20 NDP     11/14/2020 (A)  Final    Cultured on the 1st day of incubation:  Staphylococcus aureus  Susceptibility testing done on previous specimen     11/14/2020   Final    Critical Value/Significant Value, preliminary result only, called to and read back by  PATRICIO SHANNON RN 2101 11.15.20 NDP     09/21/2020 No growth  Final   09/21/2020 No growth  Final   03/13/2020 No growth  Final   03/13/2020 No growth  Final   03/03/2020 No growth  Final   03/03/2020 No growth  Final   02/22/2020 No growth  Final   02/22/2020 No growth  Final   02/16/2020 No growth  Final   02/16/2020 No growth  Final   02/15/2020 No growth  Final   02/15/2020 (A)  Final    Cultured on the 2nd day of incubation:  Staphylococcus epidermidis     02/15/2020   Final    Critical Value/Significant Value, preliminary result only, called to and read back by  Cee Benavidez RN on 2.16.20 at 2220.  JRT     02/15/2020   Final    (Note)  POSITIVE for STAPHYLOCOCCUS EPIDERMIDIS and POSITIVE for the mecA  gene (resistant to methicillin) by Verigene multiplex nucleic acid  test. Final identification and antimicrobial susceptibility testing  will be verified by standard methods.    Specimen tested with Verigene multiplex, gram-positive blood culture  nucleic acid test for the following targets: Staph aureus, Staph  epidermidis, Staph lugdunensis, other Staph species, Enterococcus  faecalis, Enterococcus faecium, Streptococcus species, S. agalactiae,  S. anginosus grp., S. pneumoniae, S. pyogenes, Listeria sp., mecA  (methicillin resistance) and Ernst/B (vancomycin resistance).    Critical Value/Significant Value called to and read back  by Cee Benavidez RN, 2.17.20 @ 0207 pt.     02/14/2020 No growth  Final   02/13/2020 (A)  Final    Cultured on the 1st day of incubation:  Proteus mirabilis  Susceptibility testing done on previous specimen     02/13/2020   Final    Critical Value/Significant Value, preliminary result only, called to and read back by  Mima Cabrera RN. @1426. 2.13.20. BS.      02/13/2020 (A)  Final    Cultured on the 1st day of incubation:  Enterococcus faecalis  Susceptibility testing done on previous specimen     02/13/2020   Final    Critical Value/Significant Value, preliminary result only, called to and read back by  Alisson Castillo RN on 2.13.20 at 1728.  JRT     02/13/2020   Final    (Note)  POSITIVE for ENTEROCOCCUS FAECALIS and NEGATIVE for Ernst/vanB genes  by [employer] multiplex nucleic acid test. Final identification and  antimicrobial susceptibility testing will be verified by standard  methods.    Specimen tested with Verigene multiplex, gram-positive blood culture  nucleic acid test for the following targets: Staph aureus, Staph  epidermidis, Staph lugdunensis, other Staph species, Enterococcus  faecalis, Enterococcus faecium, Streptococcus species, S. agalactiae,  S. anginosus grp., S. pneumoniae, S. pyogenes, Listeria sp., mecA  (methicillin resistance) and Ernst/B (vancomycin resistance).    Critical Value/Significant Value called to and read back by MARIA EUGENIA MILLAN RN 2/13/20 2036 EH         02/13/2020 (A)  Final    Cultured on the 1st day of incubation:  Proteus mirabilis     02/13/2020   Final    Critical Value/Significant Value, preliminary result only, called to and read back by  Mima Cabrera RN. @1426. 2.13.20. BS.      02/13/2020 (A)  Final    Cultured on the 1st day of incubation:  Enterococcus faecalis     02/13/2020   Final    Critical Value/Significant Value, preliminary result only, called to and read back by  Alisson Leon RN on 2.13.20 at 1728.  JRT     02/13/2020   Final     (Note)  POSITIVE for PROTEUS SPECIES by Verigene multiplex nucleic acid test.  Final identification and antimicrobial susceptibility testing will be  verified by standard methods.    Specimen tested with Verigene multiplex, gram-negative blood culture  nucleic acid test for the following targets: Acinetobacter sp.,  Citrobacter sp., Enterobacter sp., Proteus sp., E. coli, K.  pneumoniae/oxytoca, P. aeruginosa, and the following resistance  markers: CTXM, KPC, NDM, VIM, IMP and OXA.    Critical Value/Significant Value called to and read back by  Alisson Leon RN @ 1650. 2/13/20. AV     02/13/2020 No growth  Final    Specimen Description   Date Value Ref Range Status   11/17/2020 Blood Right Arm  Final   11/17/2020 Blood Left Arm  Final   11/16/2020 Blood Left Hand  Final   11/16/2020 Blood Right Arm  Final   11/16/2020 Wound Drainage  Final   11/16/2020 Wound Drainage  Final   11/15/2020 Blood Right Hand  Final   11/15/2020 Blood Left Arm  Final   11/15/2020 Feces  Final   11/14/2020 Blood Left Hand  Final   11/14/2020 Blood Left Arm  Final   09/21/2020 Blood Left Arm  Final   09/21/2020 Blood Right Arm  Final   03/13/2020 Blood Left Hand  Final   03/13/2020 Blood Right Hand  Final   03/03/2020 Blood Left Hand  Final   03/03/2020 Blood Right Arm  Final   02/22/2020 Blood Right Hand  Final   02/22/2020 Blood Right Hand  Final   02/19/2020 Nares  Final   02/16/2020 Blood Right Hand  Final   02/16/2020 Blood Left Hand  Final   02/15/2020 Blood Right Hand  Final   02/15/2020 Blood Left Hand  Final   02/14/2020 Blood Right Hand  Final   02/13/2020 Blood Left Hand  Final   02/13/2020 Blood Right Hand  Final   02/13/2020 Nasopharyngeal  Final   02/13/2020 Catheterized Urine  Final        Last check of C difficile  C Diff Toxin B PCR   Date Value Ref Range Status   11/15/2020 Negative NEG^Negative Final     Comment:     Negative: C. difficile target DNA sequences NOT detected, presumed negative   for C.difficile toxin  B or the number of bacteria present may be below the   limit of detection for the test.  FDA approved assay performed using [employer] GeneXpert real-time PCR.  A negative result does not exclude actual disease due to C. difficile and may   be due to improper collection, handling and storage of the specimen or the   number of organisms in the specimen is below the detection limit of the assay.         Virology:  Respiratory virus testing    Recent Labs   Lab Test 11/14/20  2140 02/13/20  0334   INFZA  --  Negative   INFZB  --  Negative   IRSV  --  Negative   [id number] Nasopharyngeal  --    SARSCOVRES NEGATIVE  --      Hepatitis B Testing     Recent Labs   Lab Test 02/12/20  0440 02/08/20  0408   AUSAB 0.69 1.99   HBCAB Nonreactive Nonreactive   HEPBANG Nonreactive Nonreactive     Hepatitis C Antibody   Date Value Ref Range Status   02/12/2020 Nonreactive NR^Nonreactive Final     Comment:     Assay performance characteristics have not been established for newborns,   infants, and children     02/08/2020 Nonreactive NR^Nonreactive Final     Comment:     Assay performance characteristics have not been established for newborns,   infants, and children         CMV Antibody IgG   Date Value Ref Range Status   02/12/2020 >8.0 (H) 0.0 - 0.8 AI Final     Comment:     Positive  Antibody index (AI) values reflect qualitative changes in antibody   concentration that cannot be directly associated with clinical condition or   disease state.     02/08/2020 7.8 (H) 0.0 - 0.8 AI Final     Comment:     Positive  Antibody index (AI) values reflect qualitative changes in antibody   concentration that cannot be directly associated with clinical condition or   disease state.       Varicella Zoster Virus Antibody IgG   Date Value Ref Range Status   02/12/2020 2.0 (H) 0.0 - 0.8 AI Final     Comment:     Positive, suggests prev. exposure and probable immunity  Antibody index (AI) values reflect qualitative changes in antibody   concentration  that cannot be directly associated with clinical condition or   disease state.     02/08/2020 2.4 (H) 0.0 - 0.8 AI Final     Comment:     Positive, suggests prev. exposure and probable immunity  Antibody index (AI) values reflect qualitative changes in antibody   concentration that cannot be directly associated with clinical condition or   disease state.       Toxoplasma Antibody IgG   Date Value Ref Range Status   02/12/2020 <3.0 0.0 - 7.1 IU/mL Final     Comment:     Negative- Absence of detectable Toxoplasma gondii IgG antibodies. A negative   result does not rule out acute infection.  The magnitude of the measured result is not indicative of the amount of   antibody present. The concentrations of anti-Toxoplasma gondii IgG in a given   specimen determined with assays from different manufacturers can vary due to   differences in assay methods and reagent specificity.     02/08/2020 <3.0 0.0 - 7.1 IU/mL Final     Comment:     Negative- Absence of detectable Toxoplasma gondii IgG antibodies. A negative   result does not rule out acute infection.  The magnitude of the measured result is not indicative of the amount of   antibody present. The concentrations of anti-Toxoplasma gondii IgG in a given   specimen determined with assays from different manufacturers can vary due to   differences in assay methods and reagent specificity.       Imaging:  Results for orders placed or performed during the hospital encounter of 11/14/20   US Abdomen Complete    Narrative    EXAMINATION: US ABDOMEN COMPLETE, 11/15/2020 9:20 AM     COMPARISON: 2/8/2020    HISTORY: Concern for abscess, patient unable to get CT scan due to  elevated creatinine    TECHNIQUE: The abdomen was scanned in standard fashion with  specialized ultrasound transducer(s) using both gray-scale and limited  color Doppler techniques.    FINDINGS:    Liver: The liver demonstrates normal homogeneous echotexture. The  liver measures 19.7 cm in craniocaudal dimension.  No evidence of a  focal hepatic mass. The main portal vein is patent with antegrade  flow. Gallbladder: There is no wall thickening, positive sonographic  Oyon's sign or evidence for cholelithiasis.    Bile Ducts: Both the intra- and extrahepatic biliary system are of  normal caliber. The common bile duct measures 3.7 mm in diameter.    Pancreas: Not well visualized.    Kidneys: Both kidneys are of normal echotexture, without mass or  hydronephrosis. The craniocaudal dimensions are: right- 10.5, left-  11.5.    Spleen: The spleen is normal in size, measuring 11.1 in sagittal  dimension.    Aorta and IVC: The visualized portions of the aorta and IVC are  unremarkable. The proximal aorta measures 2.7 cm in diameter and the  IVC measures 2.4 cm in diameter.    Fluid: Small volume of ascites. Partially imaged right pleural  effusion.        Impression    IMPRESSION:   1. Hepatomegaly with small volume ascites. No intra-abdominal abscess  visualized. If there is continued clinical concern for abscess,  consider noncontrast CT scan of the abdomen/pelvis.  2. Partially imaged right pleural effusion, likely trace.    I have personally reviewed the examination and initial interpretation  and I agree with the findings.    DEION FLANAGAN MD   CT Abdomen Pelvis w/o Contrast    Narrative    EXAMINATION: CT ABDOMEN PELVIS W/O CONTRAST, 11/15/2020 2:53 PM    TECHNIQUE:  Helical CT images from the lung bases through the pubic  symphysis were obtained  without IV contrast.     COMPARISON: Same-day ultrasound, CT 2/8/2020    HISTORY: Abd pain, fever, abscess suspected; Patient with VAD and  driveline infection (S. aureus infection/MSSA) and now complicated  with bacteremia, please evaluate for abscesses/intraabdominal  infectious process/cutaneous-soft tissues skin infections    FINDINGS:    Abdomen and pelvis:     Homogenous hepatic parenchyma, without focal liver lesion. No  calcified gallstones. No intra or extrahepatic biliary  dilatation.  Moderate fatty replacement of the pancreatic parenchyma, without focal  pancreatic lesion or ductal dilatation. Unremarkable spleen. Mild  thickening of the adrenal glands bilaterally, without focal nodule,  similar to prior. Unremarkable noncontrast appearance of the kidneys,  without hydronephrosis or nephrolithiasis. The urinary bladder is  distended and otherwise unremarkable. Unremarkable prostate and  seminal vesicles.    No abnormally dilated loop of small or large bowel. Small to moderate  volume simple ascites within the abdomen and pelvis, slightly  increased in comparison to CT performed in February. No defined fluid  collection is noted to suggest abscess. No intraperitoneal free air is  noted. Small fat-containing umbilical and left inguinal hernias.  Vascular patency cannot be assessed on these noncontrast images,  however there is no evidence of infrarenal abdominal aortic aneurysm.  No pathologically enlarged thoracic lymph nodes.    Lung bases:  Partially visualized postsurgical changes of LVAD  placement. Patency cannot be assessed on these noncontrast images. No  air or fluid collection along the drive line. Trace bilateral pleural  effusions and atelectatic changes, otherwise the lung bases are clear.  Stable cardiomegaly. Small sliding type hiatal hernia.    Bones and soft tissues: No acute or aggressive appearing osseous  lesion. Stepwise grade 1 anterolisthesis of L3 on L4, and L4 on L5,  with associated loss of intervertebral disc space at each level.  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 Mild diffuse body  wall edema.      Impression    IMPRESSION:   1. Small to moderate volume simple intra-abdominal ascites, slightly  increased in volume as compared to CT performed on 2/8/2020,  compatible with third spacing of fluid. No defined fluid collection is  identified to suggest abscess. No additional acute findings within the  abdomen/pelvis.  2. Trace bilateral pleural  effusions, and associated bibasilar  atelectasis. The lung bases are otherwise clear.  3. Postprocedural changes of LVAD placement. The components are  unremarkable on these noncontrast images, without associated air or  inflammatory stranding to suggest drive line infection.     I have personally reviewed the examination and initial interpretation  and I agree with the findings.    OSITO BRAVO MD   US Renal Complete    Narrative    EXAMINATION: US RENAL COMPLETE, 11/17/2020 1:43 PM     COMPARISON: None.    HISTORY: Rule out obstruction    TECHNIQUE: The kidneys and bladder were scanned in the standard  fashion with specialized ultrasound transducer(s) using both gray  scale and limited color/spectral Doppler techniques.    FINDINGS:    Right kidney: Measures 10.5 cm in length. Parenchyma is of normal  thickness and echogenicity. No focal mass. No hydronephrosis.    Left kidney: Measures 11.6 cm in length. Parenchyma is of normal  thickness and echogenicity. No focal mass. No hydronephrosis.     Bladder: Decompressed with Guevara catheter.      Impression    IMPRESSION:  1.  Normal renal ultrasound study.    OSITO BRAVO MD   XR Chest Port 1 View    Narrative    Exam: XR CHEST PORT 1 VW, 11/18/2020 2:19 PM    Indication: PICC Placement    Comparison: 9/21/2020    Findings:   Right upper extremity PICC tip at the mid to low SVC. Left chest wall  single lead automatic implantable cardiac defibrillator. Stable LVAD.  Stable enlarged cardiac silhouette. The pulmonary vasculature is  within normal limits. No pleural effusion or pneumothorax. No focal  airspace opacity. Low lung volumes.      Impression    Impression: Right upper extremity PICC tip at the mid to low SVC.    CHARLES HERNANDEZ, DO   Echo Complete    Narrative    [id number]  [id number]  [id number]  [id number]^PHYLLIS NAIR^CONNIE^YODIT           [facility],[place]  Echocardiography Laboratory  [address] [place] [id number]      Name: WOLFGANG ELACH  MRN: [id number]  : 1955  Study Date: 2020 10:56 AM  Age: 65 yrs  Gender: Male  Patient Location: [facility]  Reason For Study: Endocarditis  Ordering Physician: CONNIE JIN  Performed By: Nelly Monte RDCS     BSA: 2.1 m2  Height: 67 in  Weight: 220 lb  HR: 118  BP: 97/67 mmHg  _____________________________________________________________________________  __        Procedure  LVAD Echocardiogram with two-dimensional, color and spectral Doppler  performed. Technically difficult study.Extremely poor acoustic windows.  Limited information was obtained during study.  _____________________________________________________________________________  __        Interpretation Summary  Technically difficult study.Extremely poor acoustic windows.  Limited information was obtained during study.  LVAD cannula was seen in the expected anatomic position in the LV apex.  HM3 at 5500RPM.  LVIDd 48mm.  Septum probably normal.  Aortic valve cannot be assessed.  Flow velocities not obtained.  Dilation of the inferior vena cava is present with abnormal respiratory  variation in diameter.  No pericardial effusion is present.  _____________________________________________________________________________  __        Left Ventricle  The Ejection Fraction is estimated at <20%. LVAD cannula was seen in the  expected anatomic position in the LV apex. HM3 at 5500RPM.  LVIDd 48mm.  Septum probably normal.  Aortic valve cannot be assessed.  Flow velocities not obtained.     Right Ventricle  Right ventricular function cannot be assessed due to poor image quality.     Vessels  Dilation of the inferior vena cava is present with abnormal respiratory  variation in diameter.     Pericardium  No pericardial effusion is present.     _____________________________________________________________________________  __  MMode/2D Measurements & Calculations  IVSd: 0.92 cm  LVIDd: 4.8 cm  LVIDs: 4.6  cm  LVPWd: 0.88 cm     FS: 4.6 %  LV mass(C)d: 147.6 grams  LV mass(C)dI: 70.1 grams/m2  asc Aorta Diam: 3.5 cm  RWT: 0.37           _____________________________________________________________________________  __           Report approved by: Graciela Tomlinson 2020 11:39 AM      Transesophageal Echocardiogram    Narrative    [id number]  [id number]  [id number]  [id number]^GLORIA^ANIKA           [facility],[place]  Echocardiography Laboratory  [address] [place] [id number]        Name: WOLFGANG LEACH  MRN: [id number]  : 1955  Study Date: 2020 09:03 AM  Age: 65 yrs  Gender: Male  [place] [facility]  Reason For Study: Bacteremia  History: Sepsis  Ordering Physician: ANIKA CASTRO  Performed By: Maciel House     BSA: 2.1 m2  Height: 67 in  Weight: 210 lb  HR: 116  BP: 69/39 mmHg  Attestation  I was present during CHAMP probe placement by the fellow, Maciel House. I  personally viewed the imaging and agree with the interpretation and report as  documented by the fellow.  _____________________________________________________________________________  __     Interpretation Summary  S/P HM 3 LVAD. LVAD inflow cannula with normal doppler.     No vegetations on valves or ICD wire.     _____________________________________________________________________________  __        Procedure  Transesophageal Echocardiogram with color and spectral Doppler performed.  Procedure location Echo Lab. The procedure was performed in the Echo Lab. CHAMP  Probe #58 was used during the procedure. Patient was sedated using Fentanyl  100 mcg. Patient was sedated using Versed 2 mg. The heart rate, respiratory  rate, oxygen saturations, blood pressure, and response to care were monitored  throughout the procedure with the assistance of the nurse. I determined this  patient to be an appropriate candidate for the planned sedation and procedure  and have reassessed the patient immediately  prior to sedation and procedure.  Total sedation time: 34 minutes of continuous bedside 1:1 monitoring. The  Transducer was inserted without difficulty . The patient tolerated the  procedure well. Complications None. Informed consent for Transesophegeal echo  obtained. The patient's rhythm is paced. Good quality two-dimensional was  performed and interpreted. Good quality color and spectral Doppler were  performed and interpreted.     Left Ventricle  Mild left ventricular dilation is present. Severely (EF 10-20%) reduced left  ventricular function is present. LVAD cannula was seen in the expected  anatomic position in the LV apex. S/P HM 3 LVAD 5500 RPM. Left ventricular  wall thickness is normal.     Right Ventricle  Moderate right ventricular dilation is present. Global right ventricular  function is moderately reduced. A pacemaker lead is noted in the right  ventricle. No vegetations seen on Single lead ICD wire.     Atria  The left atrial appendage is normal. It is free of spontaneous echo contrast  and thrombus. Severe biatrial enlargement is present. The atrial septum is  intact as assessed by color Doppler .        Mitral Valve  Mitral leaflet thickness is normal. Mild to moderate mitral insufficiency is  present.     Aortic Valve  The aortic valve is tricuspid. The aortic valve remains closed with every  beat. Mild aortic valve sclerosis is present. Mild aortic insufficiency is  present.     Tricuspid Valve  The tricuspid valve is normal. Mild to moderate tricuspid insufficiency is  present.     Pulmonic Valve  The pulmonic valve is normal. Trace pulmonic insufficiency is present.     Vessels  The thoracic aorta is normal. Ascending aorta 2.9 cm.        Pericardium  No pericardial effusion is present.     Compared to Previous Study  This study was compared with the study from 11/16/2020 . There has been no  change.     _____________________________________________________________________________  __                        Report approved by: Ambrosio Saravia, Graciela 11/19/2020 11:27 AM           _____________________________________________________________________________  __

## 2020-12-11 NOTE — PROGRESS NOTES
ANTICOAGULATION FOLLOW-UP CLINIC VISIT    Patient Name:  Eliseo Tanner  Date:  2020  Contact Type:  Telephone    SUBJECTIVE:  Patient Findings     Comments:  Spoke with spouse Chapis.  She reports Eliseo does not have any changes in health, diet, medications.  He does not have any signs of bleeding. He has an appointment at the [facility] on 20 and will have his next INR checked then.        Clinical Outcomes     Comments:  Spoke with spouse, Chapis.  She reports Eliseo does not have any changes in health, diet, medications.  He does not have any signs of bleeding. He has an appointment at the [facility] on 20 and will have his next INR checked then.           OBJECTIVE    Recent labs: (last 7 days)     20   INR 2.8*       ASSESSMENT / PLAN  INR assessment THER    Recheck INR In: 1 WEEK    INR Location Outside lab      Anticoagulation Summary  As of 2020    INR goal:  2.0-3.0   TTR:  89.9 % (8.4 mo)   INR used for dosin.8 (2020)   Warfarin maintenance plan:  4 mg (4 mg x 1) every day   Full warfarin instructions:  4 mg every day   Weekly warfarin total:  28 mg   No change documented:  Krysta Mcdaniel RN   Plan last modified:  Krysta Mcdaniel RN (2020)   Next INR check:  2020   Priority:  Critical   Target end date:  Indefinite    Indications    LVAD (left ventricular assist device) present (H) [Z95.811]  Chronic systolic congestive heart failure (H) [I50.22]  Paroxysmal atrial fibrillation (H) [I48.0]             Anticoagulation Episode Summary     INR check location:      Preferred lab:      Send INR reminders to:  [facility]    Comments:  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  HM 3  placed 2020, 81mg ASA, Speak to Veronique ramírez at [phone]  2020:  Lab: [facility]:  ph: [phone] opt 5;   fax: [phone]      Anticoagulation Care Providers     Provider Role Specialty  Phone number    Nader Cisneros MD Referring Cardiovascular Disease [phone]            See the Encounter Report to view Anticoagulation Flowsheet and Dosing Calendar (Go to Encounters tab in chart review, and find the Anticoagulation Therapy Visit)    Spoke with spouse, Chapis.     Patient had LVAD placed on:   2/18/2020  Type of LVAD: HM 3  Patient's current Aspirin dose: 81 mg daily  LVAD Protocol followed: Yes    Krysta Mcdaniel RN

## 2020-12-11 NOTE — PROGRESS NOTES
"Eliseo Tanner is a 65 year old male who is being evaluated via a billable telephone visit.      The patient has been notified of following:     \"This telephone visit will be conducted via a call between you and your physician/provider. We have found that certain health care needs can be provided without the need for a physical exam.  This service lets us provide the care you need with a short phone conversation.  If a prescription is necessary we can send it directly to your pharmacy.  If lab work is needed we can place an order for that and you can then stop by our lab to have the test done at a later time.    Telephone visits are billed at different rates depending on your insurance coverage. During this emergency period, for some insurers they may be billed the same as an in-person visit.  Please reach out to your insurance provider with any questions.    If during the course of the call the physician/provider feels a telephone visit is not appropriate, you will not be charged for this service.\"    Patient has given verbal consent for Telephone visit?  Yes    What phone number would you like to be contacted at? [phone]    How would you like to obtain your AVS? [facility]    Phone call duration: 25 minutes    Negar Bhatti, DO      "

## 2020-12-11 NOTE — PATIENT INSTRUCTIONS
Thank for talking today during your clinic visit.   I spoke with cardiology and they will evaluate your diuretics. They will call you with directions.   I dose reduced the cefazolin to 2 grams IV every 12 hours due to your reduced kidney function. You will receive this medication through 12/27. After you stop the IV therapy you will be started on cephalexin 500 mg oral every 12 hours. Order was placed for you to start on 12/28-you will need to  from pharmacy. Please call if you develop fevers, chills or there is increased drainage from the LVAD. I will arrange follow up in about 2-3 months to assess how you are tolerating the cephalexin.

## 2020-12-12 RX ORDER — CEPHALEXIN 500 MG/1
500 CAPSULE ORAL 2 TIMES DAILY
Qty: 60 CAPSULE | Refills: 6 | Status: SHIPPED | OUTPATIENT
Start: 2020-12-12 | End: 2020-12-12

## 2020-12-12 RX ORDER — CEPHALEXIN 500 MG/1
500 CAPSULE ORAL 2 TIMES DAILY
Qty: 60 CAPSULE | Refills: 6 | Status: SHIPPED | OUTPATIENT
Start: 2020-12-28 | End: 2021-02-09

## 2020-12-14 LAB
ANION GAP SERPL CALCULATED.3IONS-SCNC: 10 MMOL/L
BASOPHILS - ABS (DIFF) - HISTORICAL: 0.03 K/UL (ref 0–0.2)
BASOPHILS NFR BLD AUTO: 0.5 % (ref 0–2)
BUN SERPL-MCNC: 72 MG/DL (ref 9–20)
BUN/CREATININE RATIO: 33
CALCIUM SERPL-MCNC: 9 MG/DL (ref 8.4–10.7)
CHLORIDE SERPLBLD-SCNC: 103 MMOL/L (ref 98–107)
CO2 SERPL-SCNC: 25 MMOL/L (ref 22–30)
CREAT SERPL-MCNC: 2.2 MG/DL (ref 0.6–1.2)
CRP SERPL-MCNC: 6.1 MG/L (ref 0–10)
EOSINOPHIL COUNT (ABSOLUTE): 0.26 K/UL (ref 0–0.7)
EOSINOPHIL NFR BLD AUTO: 4 % (ref 0–6)
ERYTHROCYTE [DISTWIDTH] IN BLOOD BY AUTOMATED COUNT: 22.4 % (ref 11.6–14.8)
GFR SERPL CREATININE-BSD FRML MDRD: 32 ML/MIN/1.73M2
GLUCOSE SERPL-MCNC: 110 MG/DL (ref 74–106)
HCT VFR BLD AUTO: 32.3 % (ref 40–50)
HEMOGLOBIN: 9.6 G/DL (ref 13.5–17.5)
LYMPHOCYTES # BLD AUTO: 1.41 K/UL (ref 0.8–4.1)
LYMPHOCYTES NFR BLD AUTO: 21.7 % (ref 16–43)
MCH RBC QN AUTO: 20.8 PG (ref 25.5–34)
MCHC RBC AUTO-ENTMCNC: 29.7 G/DL (ref 31.5–36.5)
MCV RBC AUTO: 70 FL (ref 80–98)
MONOCYTES # BLD AUTO: 0.64 K/UL (ref 0–1)
MONOCYTES NFR BLD AUTO: 9.9 % (ref 3–10)
NEUTROPHILS # BLD AUTO: 4.14 K/UL (ref 1.8–8)
NEUTROPHILS NFR BLD AUTO: 63.9 % (ref 45–77)
PLATELET # BLD AUTO: 403 K/UL (ref 140–400)
POTASSIUM SERPL-SCNC: 4.5 MMOL/L (ref 3.5–5.1)
RBC # BLD AUTO: 4.61 M/UL (ref 4.4–5.8)
SODIUM SERPL-SCNC: 138 MMOL/L (ref 137–145)
WBC # BLD AUTO: 6.5 K/UL (ref 4–11)

## 2020-12-14 NOTE — PROGRESS NOTES
Cardiology clinic note    Last clinic note: April 8, 2020  Eliseo Tanner is a 64 year old male with chronic systolic heart failure secondary to NICM now s/p HM 3 on 2/18, moderate CAD, HTN, ABHINAV on CPAP, DM2, CKD Stage III, ANA. His HM3 post-op course was complicated by retrosternal hematoma and bleeding in the lungs, RV failure,VT in ICU now on amiodarone and Afib w/AVR S/p DCCV on 2/28. He had pre-op proteus and enterococcus bacteremia from 2/13, s/p abx. He had an ICD placed on 3/16/2020 just before his discharge from the hospital. He was seen in [facility] recently and this is hiw first opt visit with me.   He has been feeling very well since his LVAD implantation.  He denies any lower extremity edema and shortness of breath recently.  He did have as above he is Lasix increased to 40/20 for 3 days but now he is back to 40 once a day and he is doing better valve abuse.  He denies PND orthopnea he is able to walk around with his wife quite a bit without any symptoms. He denies any bleeding no strokelike symptoms no medication intolerance.  He also denies any palpitations dizziness or lightheadedness.  The driveline looks good.  They checked the LVAD frequently the flows always in the 4.1 to 4.3 range PIs in the 3.5 to 3.9 range and that there were no alarms.  Review of system otherwise negative.  Weight has increased then increased bumex to 2 mg pO bid and now creatinine increased.     On 11/14, patient directly admitted by ID from clinic after BCx positive for MSSA.  Of note, patient did have a few days of diarrhea which had improved on day of admission and resolved since 11/15 (C.diff negative). Abdominal imaging (US and CT abd/pelvis) revealed no intraabdominal fluid collection concerning for abscess/infectious process. TTE did not comment on vegetation. CHAMP also did not show any signs of endocarditis.  Last positive BCx on 11/14 showed MSSA, BCx 11/15-11/17 NGTD. Plan to stop abx on 12/27/20.     SOCIAL  HISTORY:  Social History            Socioeconomic History     Marital status:        Spouse name: Not on file     Number of children: Not on file     Years of education: Not on file     Highest education level: Not on file   Occupational History     Not on file   Social Needs     Financial resource strain: Not on file     Food insecurity       Worry: Not on file       Inability: Not on file     Transportation needs       Medical: Not on file       Non-medical: Not on file   Tobacco Use     Smoking status: Former Smoker       Last attempt to quit: 1994       Years since quittin.0     Smokeless tobacco: Never Used   Substance and Sexual Activity     Alcohol use: Not on file     Drug use: Not on file     Sexual activity: Not on file   Lifestyle     Physical activity       Days per week: Not on file       Minutes per session: Not on file     Stress: Not on file   Relationships     Social connections       Talks on phone: Not on file       Gets together: Not on file       Attends Tenriism service: Not on file       Active member of club or organization: Not on file       Attends meetings of clubs or organizations: Not on file       Relationship status: Not on file     Intimate partner violence       Fear of current or ex partner: Not on file       Emotionally abused: Not on file       Physically abused: Not on file       Forced sexual activity: Not on file   Other Topics Concern     Not on file   Social History Narrative     Not on file      CURRENT MEDICATIONS:    Current Outpatient Medications   Medication     acetaminophen (TYLENOL) 325 MG tablet     albuterol (PROAIR HFA/PROVENTIL HFA/VENTOLIN HFA) 108 (90 Base) MCG/ACT inhaler     allopurinol (ZYLOPRIM) 100 MG tablet     amiodarone (PACERONE) 200 MG tablet     aspirin (ASA) 81 MG EC tablet     atorvastatin (LIPITOR) 20 MG tablet     bisacodyl (DULCOLAX) 10 MG suppository     bumetanide (BUMEX) 1 MG tablet     ceFAZolin (ANCEF) intermittent infusion  "2 g in 100 mL dextrose PRE-MIX     [START ON 12/28/2020] cephALEXin (KEFLEX) 500 MG capsule     co-enzyme Q-10 200 MG CAPS     finasteride (PROSCAR) 5 MG tablet     FLUoxetine (PROZAC) 20 MG capsule     hydrALAZINE (APRESOLINE) 25 MG tablet     insulin glargine (BASAGLAR KWIKPEN) 100 UNIT/ML pen     insulin pen needle (BD JAIME U/F) 32G X 4 MM miscellaneous     lisinopril (ZESTRIL) 10 MG tablet     magnesium oxide (MAG-OX) 400 MG tablet     multivitamin w/minerals (THERA-VIT-M) tablet     nitroGLYcerin (NITROSTAT) 0.4 MG sublingual tablet     omeprazole (PRILOSEC) 20 MG DR capsule     polyethylene glycol (MIRALAX) 17 g packet     potassium chloride ER (KLOR-CON M) 20 MEQ CR tablet     senna-docusate (SENOKOT-S/PERICOLACE) 8.6-50 MG tablet     tamsulosin (FLOMAX) 0.4 MG capsule     warfarin ANTICOAGULANT (COUMADIN) 3 MG tablet     zolpidem (AMBIEN) 5 MG tablet     No current facility-administered medications for this visit.      ROS:   Constitutional: No fever, chills, or sweats. Weight is 195lbs  ENT: No visual disturbance, ear ache, epistaxis, sore throat.   Allergies/Immunologic: Negative.   Respiratory: No cough, hemoptysis.   Cardiovascular: As per HPI.   GI: No nausea, vomiting, hematemesis, melena, or hematochezia.   : No urinary frequency, dysuria, or hematuria.   Integument: Negative.   Psychiatric: Pleasant, no major depression noted  Neuro: No focal neurological deficits noted  Endocrinology: Negative.   Musculoskeletal: As per HPI.      EXAM:  BP (!) 80/0 (BP Location: Left arm, Patient Position: Chair, Cuff Size: Adult Regular)   Pulse 97   Ht 1.702 m (5' 7\")   Wt 96.2 kg (212 lb)   SpO2 98%   BMI 33.20 kg/m    GENERAL: Appears comfortable, in no acute distress.   HEENT: Eye symmetrical, no discharge or icterus bilaterally. Mucous membranes moist and without lesions.  CV: Hum of HM3, occasional S1S2. JVP ~5.   RESPIRATORY: Respirations regular, even, and unlabored. Lungs CTA throughout.   GI: Soft, " non distended with normoactive bowel sounds. No tenderness, rebound, guarding.   EXTREMITIES: No peripheral edema. Trace bilateral edema. Minimal pulses in the setting of VAD  NEUROLOGIC: Alert and interacting appropriately. No focal deficits.   MUSCULOSKELETAL: No joint swelling or tenderness.   SKIN: No jaundice. No rashes or lesions. Driveline dressing c/d/i.     Labs:    Orders Only on 12/17/2020   Component Date Value Ref Range Status     FVC-Pred 12/17/2020 3.93  L Preliminary     FVC-Pre 12/17/2020 3.31  L Preliminary     FVC-%Pred-Pre 12/17/2020 84  % Preliminary     FEV1-Pre 12/17/2020 2.42  L Preliminary     FEV1-%Pred-Pre 12/17/2020 80  % Preliminary     FEV1FVC-Pred 12/17/2020 77  % Preliminary     FEV1FVC-Pre 12/17/2020 73  % Preliminary     FEFMax-Pred 12/17/2020 7.98  L/sec Preliminary     FEFMax-Pre 12/17/2020 4.96  L/sec Preliminary     FEFMax-%Pred-Pre 12/17/2020 62  % Preliminary     FEF2575-Pred 12/17/2020 2.42  L/sec Preliminary     FEF2575-Pre 12/17/2020 1.80  L/sec Preliminary     ZIB6121-%Pred-Pre 12/17/2020 74  % Preliminary     ExpTime-Pre 12/17/2020 6.99  sec Preliminary     FIFMax-Pre 12/17/2020 5.19  L/sec Preliminary     VC-Pred 12/17/2020 4.30  L Preliminary     VC-Pre 12/17/2020 3.29  L Preliminary     VC-%Pred-Pre 12/17/2020 76  % Preliminary     IC-Pred 12/17/2020 3.71  L Preliminary     IC-Pre 12/17/2020 2.37  L Preliminary     IC-%Pred-Pre 12/17/2020 63  % Preliminary     ERV-Pred 12/17/2020 0.60  L Preliminary     ERV-Pre 12/17/2020 0.92  L Preliminary     ERV-%Pred-Pre 12/17/2020 154  % Preliminary     FEV1FEV6-Pred 12/17/2020 78  % Preliminary     FEV1FEV6-Pre 12/17/2020 74  % Preliminary     DLCOunc-Pred 12/17/2020 24.05  ml/min/mmHg Preliminary     DLCOunc-Pre 12/17/2020 16.42  ml/min/mmHg Preliminary     DLCOunc-%Pred-Pre 12/17/2020 68  % Preliminary     DLCOcor-Pre 12/17/2020 19.84  ml/min/mmHg Preliminary     DLCOcor-%Pred-Pre 12/17/2020 82  % Preliminary     VA-Pre  "12/17/2020 5.10  L Preliminary     VA-%Pred-Pre 12/17/2020 88  % Preliminary     FEV1SVC-Pred 12/17/2020 70  % Preliminary     FEV1SVC-Pre 12/17/2020 73  % Preliminary     Imaging  ICD Check  Patient initiated remote ICD transmission received and reviewed.  Device transmission sent per MD orders.  Patient has a Medtronic single lead ICD.  Normal ICD function.  43 AF episodes recorded since 3/21/20, longest of 56 minutes, AF burden = 3.1%.  No ventricular arrhythmias recorded.  ICD settings:  Monitor zone @ 176-222 bpm, VF zone @ 222 bpm.   Presenting EGM = Irregular VS @  bpm.    =<0.1%.  OptiVol fluid index is establishing baseline. Estimated battery longevity to PERLA = 11.5 years.   Battery voltage = 3.15V.  No short v-v intervals recorded. Lead trends appear stable.  Patient had an episode of \"dizziness and clamminess over the weekend\" per LVAD coordinator, Valeria.  Patient is scheduled for appointment with STEW Padron today.  Plan for patient to return to clinic in 3 months as scheduled.  DEIDRA Marcos RN.     12/17/2020  Patient seen in clinic for evaluation and iterative programming of his Medtronic single lead ICD per MD orders. Patient has an appointment to see Dr. Cisneros today. Normal ICD function. 213 episodes recorded as AT/AF - 6-24 minutes. AF burden = 0.7%. Patient is taking Warfarin. No ventricular arrhythmias recorded. Intrinsic rhythm = VS @ 110 bpm.  = <1%. OptiVol fluid index is at baseline. Estimated battery longevity to PERLA = 11.1 years. No short v-v intervals recorded. Lead trends appear stable. Patient reports that he is feeling fine today. Plan for patient to return to clinic in 3 months. ALISTAIR Wang RN       CXR 3/9/2020:  1.  Stable supportive lines and tubes.  2.  Cardiomegaly with LVAD placement, stable.  3.  Right apical hemothorax evacuation. Right mid/lower lobe  subsegmental atelectasis, overall stable.     CT Chest 3/4/2020:  1. Grossly stable large right apical " hemothorax compared to recent  radiographs. Tiny nondisplaced fractures of the medial right first and  second ribs. Tiny right pneumothorax.   2. Intrafissural position of the right basilar chest tube abutting the  pericardium and with the tip immediately inferior to the right hilar  vasculature.   3. Small left pleural effusion, which also contains hyperdense fluid  suspicious for small component of left-sided hemothorax.  4. Stable retrosternal hematoma.  5. Cardiomegaly with small pericardial effusion versus  hemopericardium. Stable LVAD and pericardial drain.     Echo 2/25/2020:  Left ventricular size is normal. Severely reduced left ventricular systolic function. Septum is midline  LVAD inflow or outflow cannulae not visualized. Doppler velocities are not elevated.  Moderate to severely reduced right ventricular systolic function.  Aortic valve appears closed during the entire cardiac cycle. Trace aortic  insufficiency is present. IVC is plethoric.  No pericardial effusion present.     Assessment and Plan:   Eliseo Tanner is a 64 year old male with chronic systolic heart failure secondary to NICM now s/p HM 3 on 2/18, moderate CAD, HTN, ABHINAV on CPAP, DM2, CKD Stage III, ANA. His HM3 post-op course was complicated by retrosternal hematoma, RV failure,VT in ICU now on amiodarone and Afib w/AVR S/p DCCV on 2/28. He had pre-op proteus and enterococcus bacteremia from 2/13, s/p abx. He had an ICD placed on 3/16/2020 and is followed in [facility].  He has been doing very well since his LVAD implantation and continues to do well.  There is no concerning signs or symptoms of stroke or bleeding.  We will decrease his bumex to 2 mg in am and 1 mg in pm as he looks on the dry side today. Will plan for RHC to evaluate volume status.    Reading clinic if further medication adjustments are needed.  Blood work reviewed from today and creatinine has increased.  Discussed about COVID vaccine too.      Chronic SCHF  secondary to NICM s/p HM III with RV dysfunction. Implanted 2/18 and complicated by bleeding. Echo at 5600 rpm notes mild-moderate RV dysfunction with AV closed and trace AI, current speed 5500 rpm.   Stage D, NYHA Class IIIB  ACEi/ARB Lisinopril 10 mg po daily. Increase Hydralazine 100 mg po TID.   BB Defer s/p recent LVAD w/right heart failure on digoxin  Aldosterone antagonist deferred while other medical therapy is prioritized  SCD prophylaxis ICD implanted 3/16.  Fluid status: adjust bumex to 2/1 mg PO as kidney function is worse   LDH: stable at 290s  Anticoagulation: Coumadin per pharmacy. Goal 2-3.  Antiplatelet: ASA 81 mg po daily  - Continue Digoxin for RV support.  - Will plan for RHC to evaluate volume status.   - C/w abx till 12/27 for bacteremia , will try to send wound Cx today . Will f/u with ID.      VAD interrogation today: VAD interrogation reviewed with VAD coordinator. Agree with findings. Rare PI events, no power spikes, speed drops, or other findings suspicious of pump malfunction noted.      HTN.   - Continue Lisinopril   - Increase hydralazine to three times a da    New Onset Afib s/p DCCV/history of Aflutter. ECG consistent with Afib w/ RVR and aberrancy vs. NATHALIA vs. AT vs. Slow VT. S/p DCCV on 2/28 back into sinus rhythm.  - Coumadin as above.   - Continue Digoxin to support RV. Creatinine is increased. Will recheck in 1 week.      Retrosternal Hematoma. CT chest 2/26: measuring 5.1 cm. S/P chest tube per [facility] 3/5, d/c'ed 3/15. Chest X-ray 3/18 with stable fluid to right interlobular fissure, Hgb stable.   - Follow with [facility]      Candace Alfaro MD    Discussed with Dr Cisneros    I have seen and evaluated the patient and agree with the assessment and plan as above.  Nader Cisneros MD

## 2020-12-15 ENCOUNTER — CARE COORDINATION (OUTPATIENT)
Dept: CARDIOLOGY | Facility: CLINIC | Age: 65
End: 2020-12-15

## 2020-12-15 ENCOUNTER — EXTERNAL ORDER RESULTS (OUTPATIENT)
Dept: INFECTIOUS DISEASES | Facility: CLINIC | Age: 65
End: 2020-12-15

## 2020-12-15 ENCOUNTER — ANTICOAGULATION THERAPY VISIT (OUTPATIENT)
Dept: ANTICOAGULATION | Facility: CLINIC | Age: 65
End: 2020-12-15

## 2020-12-15 DIAGNOSIS — I48.0 PAROXYSMAL ATRIAL FIBRILLATION (H): ICD-10-CM

## 2020-12-15 DIAGNOSIS — I50.22 CHRONIC SYSTOLIC CONGESTIVE HEART FAILURE (H): ICD-10-CM

## 2020-12-15 DIAGNOSIS — Z95.811 LVAD (LEFT VENTRICULAR ASSIST DEVICE) PRESENT (H): Primary | ICD-10-CM

## 2020-12-15 DIAGNOSIS — Z95.811 LVAD (LEFT VENTRICULAR ASSIST DEVICE) PRESENT (H): ICD-10-CM

## 2020-12-15 DIAGNOSIS — T82.7XXA INFECTION ASSOCIATED WITH DRIVELINE OF LEFT VENTRICULAR ASSIST DEVICE (LVAD) (H): ICD-10-CM

## 2020-12-15 LAB — INR PPP: 2.7 (ref 0.9–1.1)

## 2020-12-15 NOTE — PROGRESS NOTES
ANTICOAGULATION FOLLOW-UP CLINIC VISIT    Patient Name:  Eliseo Tanner  Date:  12/15/2020  Contact Type:  Telephone    SUBJECTIVE:  Patient Findings     Positives:  Change in medications (Bumex dose decreased.)             OBJECTIVE    Recent labs: (last 7 days)     12/15/20   INR 2.7*       ASSESSMENT / PLAN  INR assessment THER    Recheck INR In: 8 DAYS    INR Location Outside lab      Anticoagulation Summary  As of 12/15/2020    INR goal:  2.0-3.0   TTR:  90.1 % (8.5 mo)   INR used for dosin.7 (12/15/2020)   Warfarin maintenance plan:  4 mg (4 mg x 1) every day   Full warfarin instructions:  4 mg every day   Weekly warfarin total:  28 mg   No change documented:  Destiny Rogel RN   Plan last modified:  Krysta Mcdaniel RN (2020)   Next INR check:  2020   Priority:  Critical   Target end date:  Indefinite    Indications    LVAD (left ventricular assist device) present (H) [Z95.811]  Chronic systolic congestive heart failure (H) [I50.22]  Paroxysmal atrial fibrillation (H) [I48.0]             Anticoagulation Episode Summary     INR check location:      Preferred lab:      Send INR reminders to:  [facility]    Comments:  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  HM 3  placed 2020, 81mg ASA, Speak to desiree, Veronique at [phone]  2020:  Lab: [facility]:  ph: [phone] opt 5;   fax: [phone]      Anticoagulation Care Providers     Provider Role Specialty Phone number    Nader Cisneros MD Referring Cardiovascular Disease [phone]            See the Encounter Report to view Anticoagulation Flowsheet and Dosing Calendar (Go to Encounters tab in chart review, and find the Anticoagulation Therapy Visit)  Spoke with Chapis.  Patient had LVAD placed on:   2020  Type of LVAD: HM3  Patient's current Aspirin dose: 81mg  LVAD Protocol followed:  Yes  Destniy Rogel RN

## 2020-12-15 NOTE — PROGRESS NOTES
D: Pt paged VAD Coordinator on-call and reported a 6 lb weight gain since 12/12. On 12/11, pt was instructed to decrease bumex to 1mg BID (from 2mg BID).   I/A: Pt reported that he's more SOB and notices swelling in abdomen. Reports VAD numbers stable.   Discussed pt with Karolina. Per Karolina, pt should take bumex 2mg BID today and bumex 2mg in the AM and 1mg in the PM tomorrow. Pt has clinic appt with Dr. Cisneros on Thurs. Pt should keep KCl at 20mEq daily. Called pt w/ these instructions. Pt did not answer. Left detailed VM message with instructions and requested that pt calls back to confirm he got the message or with any questions.   P: Pt will RTC 12/17.      Addendum: Pt called VAD Coordinator on-call back and verbalized understanding of medication instructions.

## 2020-12-17 ENCOUNTER — TELEPHONE (OUTPATIENT)
Dept: INFECTIOUS DISEASES | Facility: CLINIC | Age: 65
End: 2020-12-17

## 2020-12-17 ENCOUNTER — OFFICE VISIT (OUTPATIENT)
Dept: CARDIOLOGY | Facility: CLINIC | Age: 65
End: 2020-12-17
Attending: INTERNAL MEDICINE
Payer: MEDICARE

## 2020-12-17 ENCOUNTER — ANTICOAGULATION THERAPY VISIT (OUTPATIENT)
Dept: ANTICOAGULATION | Facility: CLINIC | Age: 65
End: 2020-12-17

## 2020-12-17 ENCOUNTER — ANCILLARY PROCEDURE (OUTPATIENT)
Dept: CARDIOLOGY | Facility: CLINIC | Age: 65
End: 2020-12-17
Attending: NURSE PRACTITIONER
Payer: MEDICARE

## 2020-12-17 VITALS
HEART RATE: 97 BPM | OXYGEN SATURATION: 98 % | BODY MASS INDEX: 33.27 KG/M2 | SYSTOLIC BLOOD PRESSURE: 80 MMHG | WEIGHT: 212 LBS | HEIGHT: 67 IN

## 2020-12-17 DIAGNOSIS — I48.0 PAROXYSMAL ATRIAL FIBRILLATION (H): ICD-10-CM

## 2020-12-17 DIAGNOSIS — N18.32 STAGE 3B CHRONIC KIDNEY DISEASE (H): ICD-10-CM

## 2020-12-17 DIAGNOSIS — Z95.811 LVAD (LEFT VENTRICULAR ASSIST DEVICE) PRESENT (H): ICD-10-CM

## 2020-12-17 DIAGNOSIS — T82.7XXA INFECTION ASSOCIATED WITH DRIVELINE OF LEFT VENTRICULAR ASSIST DEVICE (LVAD) (H): ICD-10-CM

## 2020-12-17 DIAGNOSIS — Z95.810 S/P ICD (INTERNAL CARDIAC DEFIBRILLATOR) PROCEDURE: ICD-10-CM

## 2020-12-17 DIAGNOSIS — E78.2 MIXED HYPERLIPIDEMIA: ICD-10-CM

## 2020-12-17 DIAGNOSIS — I50.84 ACC/AHA STAGE D HEART FAILURE (H): ICD-10-CM

## 2020-12-17 DIAGNOSIS — I42.8 NONISCHEMIC CARDIOMYOPATHY (H): ICD-10-CM

## 2020-12-17 DIAGNOSIS — Z79.899 ON AMIODARONE THERAPY: ICD-10-CM

## 2020-12-17 DIAGNOSIS — L08.9 WOUND INFECTION: Primary | ICD-10-CM

## 2020-12-17 DIAGNOSIS — I10 BENIGN ESSENTIAL HYPERTENSION: ICD-10-CM

## 2020-12-17 DIAGNOSIS — I50.22 CHRONIC SYSTOLIC CONGESTIVE HEART FAILURE (H): ICD-10-CM

## 2020-12-17 DIAGNOSIS — T14.8XXA WOUND INFECTION: Primary | ICD-10-CM

## 2020-12-17 DIAGNOSIS — T82.9XXA COMPLICATION INVOLVING LEFT VENTRICULAR ASSIST DEVICE (LVAD), INITIAL ENCOUNTER: ICD-10-CM

## 2020-12-17 LAB
ALBUMIN SERPL-MCNC: 3.4 G/DL (ref 3.4–5)
ALP SERPL-CCNC: 173 U/L (ref 40–150)
ALT SERPL W P-5'-P-CCNC: 12 U/L (ref 0–70)
ANION GAP SERPL CALCULATED.3IONS-SCNC: 9 MMOL/L (ref 3–14)
AST SERPL W P-5'-P-CCNC: 40 U/L (ref 0–45)
BASOPHILS # BLD AUTO: 0 10E9/L (ref 0–0.2)
BASOPHILS NFR BLD AUTO: 0.6 %
BILIRUB SERPL-MCNC: 0.5 MG/DL (ref 0.2–1.3)
BUN SERPL-MCNC: 79 MG/DL (ref 7–30)
CALCIUM SERPL-MCNC: 8.9 MG/DL (ref 8.5–10.1)
CHLORIDE SERPL-SCNC: 101 MMOL/L (ref 94–109)
CO2 SERPL-SCNC: 24 MMOL/L (ref 20–32)
CREAT SERPL-MCNC: 2.74 MG/DL (ref 0.66–1.25)
CRP SERPL-MCNC: 8 MG/L (ref 0–8)
D DIMER PPP FEU-MCNC: 2.8 UG/ML FEU (ref 0–0.5)
DIFFERENTIAL METHOD BLD: ABNORMAL
EOSINOPHIL # BLD AUTO: 0.2 10E9/L (ref 0–0.7)
EOSINOPHIL NFR BLD AUTO: 2.8 %
ERYTHROCYTE [DISTWIDTH] IN BLOOD BY AUTOMATED COUNT: 22.9 % (ref 10–15)
GFR SERPL CREATININE-BSD FRML MDRD: 23 ML/MIN/{1.73_M2}
GLUCOSE SERPL-MCNC: 146 MG/DL (ref 70–99)
HCT VFR BLD AUTO: 32.8 % (ref 40–53)
HGB BLD-MCNC: 9.7 G/DL (ref 13.3–17.7)
IMM GRANULOCYTES # BLD: 0.1 10E9/L (ref 0–0.4)
IMM GRANULOCYTES NFR BLD: 1 %
INR PPP: 2.66 (ref 0.86–1.14)
LDH SERPL L TO P-CCNC: 292 U/L (ref 85–227)
LYMPHOCYTES # BLD AUTO: 1.1 10E9/L (ref 0.8–5.3)
LYMPHOCYTES NFR BLD AUTO: 15.4 %
MCH RBC QN AUTO: 20.6 PG (ref 26.5–33)
MCHC RBC AUTO-ENTMCNC: 29.6 G/DL (ref 31.5–36.5)
MCV RBC AUTO: 70 FL (ref 78–100)
MONOCYTES # BLD AUTO: 0.8 10E9/L (ref 0–1.3)
MONOCYTES NFR BLD AUTO: 11.5 %
NEUTROPHILS # BLD AUTO: 4.9 10E9/L (ref 1.6–8.3)
NEUTROPHILS NFR BLD AUTO: 68.7 %
NRBC # BLD AUTO: 0 10*3/UL
NRBC BLD AUTO-RTO: 0 /100
PLATELET # BLD AUTO: 328 10E9/L (ref 150–450)
POTASSIUM SERPL-SCNC: 4.1 MMOL/L (ref 3.4–5.3)
PROT SERPL-MCNC: 8.7 G/DL (ref 6.8–8.8)
RBC # BLD AUTO: 4.72 10E12/L (ref 4.4–5.9)
SODIUM SERPL-SCNC: 134 MMOL/L (ref 133–144)
WBC # BLD AUTO: 7.1 10E9/L (ref 4–11)

## 2020-12-17 PROCEDURE — 87205 SMEAR GRAM STAIN: CPT | Performed by: INTERNAL MEDICINE

## 2020-12-17 PROCEDURE — 94375 RESPIRATORY FLOW VOLUME LOOP: CPT | Performed by: INTERNAL MEDICINE

## 2020-12-17 PROCEDURE — 93750 INTERROGATION VAD IN PERSON: CPT | Performed by: INTERNAL MEDICINE

## 2020-12-17 PROCEDURE — 87186 SC STD MICRODIL/AGAR DIL: CPT | Performed by: INTERNAL MEDICINE

## 2020-12-17 PROCEDURE — 93282 PRGRMG EVAL IMPLANTABLE DFB: CPT | Performed by: INTERNAL MEDICINE

## 2020-12-17 PROCEDURE — 85379 FIBRIN DEGRADATION QUANT: CPT | Performed by: PATHOLOGY

## 2020-12-17 PROCEDURE — 87077 CULTURE AEROBIC IDENTIFY: CPT | Performed by: INTERNAL MEDICINE

## 2020-12-17 PROCEDURE — 85610 PROTHROMBIN TIME: CPT | Performed by: PATHOLOGY

## 2020-12-17 PROCEDURE — 87075 CULTR BACTERIA EXCEPT BLOOD: CPT | Performed by: INTERNAL MEDICINE

## 2020-12-17 PROCEDURE — 83615 LACTATE (LD) (LDH) ENZYME: CPT | Performed by: PATHOLOGY

## 2020-12-17 PROCEDURE — 85025 COMPLETE CBC W/AUTO DIFF WBC: CPT | Performed by: PATHOLOGY

## 2020-12-17 PROCEDURE — 87070 CULTURE OTHR SPECIMN AEROBIC: CPT | Performed by: INTERNAL MEDICINE

## 2020-12-17 PROCEDURE — 94729 DIFFUSING CAPACITY: CPT | Performed by: INTERNAL MEDICINE

## 2020-12-17 PROCEDURE — 86140 C-REACTIVE PROTEIN: CPT | Performed by: PATHOLOGY

## 2020-12-17 PROCEDURE — 36415 COLL VENOUS BLD VENIPUNCTURE: CPT | Performed by: PATHOLOGY

## 2020-12-17 PROCEDURE — G0463 HOSPITAL OUTPT CLINIC VISIT: HCPCS | Mod: 25

## 2020-12-17 PROCEDURE — 80053 COMPREHEN METABOLIC PANEL: CPT | Performed by: PATHOLOGY

## 2020-12-17 PROCEDURE — 99214 OFFICE O/P EST MOD 30 MIN: CPT | Mod: 25 | Performed by: INTERNAL MEDICINE

## 2020-12-17 RX ORDER — HYDRALAZINE HYDROCHLORIDE 50 MG/1
100 TABLET, FILM COATED ORAL 3 TIMES DAILY
Qty: 560 TABLET | Refills: 3 | Status: SHIPPED | OUTPATIENT
Start: 2020-12-17 | End: 2021-12-26

## 2020-12-17 RX ORDER — BUMETANIDE 1 MG/1
TABLET ORAL
Qty: 180 TABLET | Refills: 3 | Status: SHIPPED | OUTPATIENT
Start: 2020-12-17 | End: 2021-01-11

## 2020-12-17 ASSESSMENT — PAIN SCALES - GENERAL: PAINLEVEL: NO PAIN (0)

## 2020-12-17 ASSESSMENT — MIFFLIN-ST. JEOR: SCORE: 1695.26

## 2020-12-17 NOTE — LETTER
12/17/2020      RE: Eliseo Tanner  [address] [facility] [address]       Dear Colleague,    Thank you for the opportunity to participate in the care of your patient, Eliseo Tanner, at the [facility] HEART [facility]. Please see a copy of my visit note below.    Cardiology clinic note    Last clinic note: April 8, 2020  Eliseo Tanner is a 64 year old male with chronic systolic heart failure secondary to NICM now s/p HM 3 on 2/18, moderate CAD, HTN, ABHINAV on CPAP, DM2, CKD Stage III, ANA. His HM3 post-op course was complicated by retrosternal hematoma and bleeding in the lungs, RV failure,VT in ICU now on amiodarone and Afib w/AVR S/p DCCV on 2/28. He had pre-op proteus and enterococcus bacteremia from 2/13, s/p abx. He had an ICD placed on 3/16/2020 just before his discharge from the hospital. He was seen in [facility] recently and this is hiw first opt visit with me.   He has been feeling very well since his LVAD implantation.  He denies any lower extremity edema and shortness of breath recently.  He did have as above he is Lasix increased to 40/20 for 3 days but now he is back to 40 once a day and he is doing better valve abuse.  He denies PND orthopnea he is able to walk around with his wife quite a bit without any symptoms. He denies any bleeding no strokelike symptoms no medication intolerance.  He also denies any palpitations dizziness or lightheadedness.  The driveline looks good.  They checked the LVAD frequently the flows always in the 4.1 to 4.3 range PIs in the 3.5 to 3.9 range and that there were no alarms.  Review of system otherwise negative.  Weight has increased then increased bumex to 2 mg pO bid and now creatinine increased.     On 11/14, patient directly admitted by ID from clinic after BCx positive for MSSA.  Of note, patient did have a few days of diarrhea which had improved on day of admission and resolved since 11/15  (C.diff negative). Abdominal imaging (US and CT abd/pelvis) revealed no intraabdominal fluid collection concerning for abscess/infectious process. TTE did not comment on vegetation. CHAMP also did not show any signs of endocarditis.  Last positive BCx on  showed MSSA, BCx 11/15- NGTD. Plan to stop abx on 20.     SOCIAL HISTORY:  Social History            Socioeconomic History     Marital status:        Spouse name: Not on file     Number of children: Not on file     Years of education: Not on file     Highest education level: Not on file   Occupational History     Not on file   Social Needs     Financial resource strain: Not on file     Food insecurity       Worry: Not on file       Inability: Not on file     Transportation needs       Medical: Not on file       Non-medical: Not on file   Tobacco Use     Smoking status: Former Smoker       Last attempt to quit: 1994       Years since quittin.0     Smokeless tobacco: Never Used   Substance and Sexual Activity     Alcohol use: Not on file     Drug use: Not on file     Sexual activity: Not on file   Lifestyle     Physical activity       Days per week: Not on file       Minutes per session: Not on file     Stress: Not on file   Relationships     Social connections       Talks on phone: Not on file       Gets together: Not on file       Attends Rastafari service: Not on file       Active member of club or organization: Not on file       Attends meetings of clubs or organizations: Not on file       Relationship status: Not on file     Intimate partner violence       Fear of current or ex partner: Not on file       Emotionally abused: Not on file       Physically abused: Not on file       Forced sexual activity: Not on file   Other Topics Concern     Not on file   Social History Narrative     Not on file      CURRENT MEDICATIONS:    Current Outpatient Medications   Medication     acetaminophen (TYLENOL) 325 MG tablet     albuterol (PROAIR  HFA/PROVENTIL HFA/VENTOLIN HFA) 108 (90 Base) MCG/ACT inhaler     allopurinol (ZYLOPRIM) 100 MG tablet     amiodarone (PACERONE) 200 MG tablet     aspirin (ASA) 81 MG EC tablet     atorvastatin (LIPITOR) 20 MG tablet     bisacodyl (DULCOLAX) 10 MG suppository     bumetanide (BUMEX) 1 MG tablet     ceFAZolin (ANCEF) intermittent infusion 2 g in 100 mL dextrose PRE-MIX     [START ON 12/28/2020] cephALEXin (KEFLEX) 500 MG capsule     co-enzyme Q-10 200 MG CAPS     finasteride (PROSCAR) 5 MG tablet     FLUoxetine (PROZAC) 20 MG capsule     hydrALAZINE (APRESOLINE) 25 MG tablet     insulin glargine (BASAGLAR KWIKPEN) 100 UNIT/ML pen     insulin pen needle (BD JAIME U/F) 32G X 4 MM miscellaneous     lisinopril (ZESTRIL) 10 MG tablet     magnesium oxide (MAG-OX) 400 MG tablet     multivitamin w/minerals (THERA-VIT-M) tablet     nitroGLYcerin (NITROSTAT) 0.4 MG sublingual tablet     omeprazole (PRILOSEC) 20 MG DR capsule     polyethylene glycol (MIRALAX) 17 g packet     potassium chloride ER (KLOR-CON M) 20 MEQ CR tablet     senna-docusate (SENOKOT-S/PERICOLACE) 8.6-50 MG tablet     tamsulosin (FLOMAX) 0.4 MG capsule     warfarin ANTICOAGULANT (COUMADIN) 3 MG tablet     zolpidem (AMBIEN) 5 MG tablet     No current facility-administered medications for this visit.      ROS:   Constitutional: No fever, chills, or sweats. Weight is 195lbs  ENT: No visual disturbance, ear ache, epistaxis, sore throat.   Allergies/Immunologic: Negative.   Respiratory: No cough, hemoptysis.   Cardiovascular: As per HPI.   GI: No nausea, vomiting, hematemesis, melena, or hematochezia.   : No urinary frequency, dysuria, or hematuria.   Integument: Negative.   Psychiatric: Pleasant, no major depression noted  Neuro: No focal neurological deficits noted  Endocrinology: Negative.   Musculoskeletal: As per HPI.      EXAM:  LVAD parameters reviewed over the phone.  Again flows are 4.1 to 4.3 L/min and PIs around 3.5-3.9.  No LVAD alarms.  There is no  reported lower extremity edema no abdominal bloating.     Labs:    Orders Only on 12/17/2020   Component Date Value Ref Range Status     FVC-Pred 12/17/2020 3.93  L Preliminary     FVC-Pre 12/17/2020 3.31  L Preliminary     FVC-%Pred-Pre 12/17/2020 84  % Preliminary     FEV1-Pre 12/17/2020 2.42  L Preliminary     FEV1-%Pred-Pre 12/17/2020 80  % Preliminary     FEV1FVC-Pred 12/17/2020 77  % Preliminary     FEV1FVC-Pre 12/17/2020 73  % Preliminary     FEFMax-Pred 12/17/2020 7.98  L/sec Preliminary     FEFMax-Pre 12/17/2020 4.96  L/sec Preliminary     FEFMax-%Pred-Pre 12/17/2020 62  % Preliminary     FEF2575-Pred 12/17/2020 2.42  L/sec Preliminary     FEF2575-Pre 12/17/2020 1.80  L/sec Preliminary     GOH6799-%Pred-Pre 12/17/2020 74  % Preliminary     ExpTime-Pre 12/17/2020 6.99  sec Preliminary     FIFMax-Pre 12/17/2020 5.19  L/sec Preliminary     VC-Pred 12/17/2020 4.30  L Preliminary     VC-Pre 12/17/2020 3.29  L Preliminary     VC-%Pred-Pre 12/17/2020 76  % Preliminary     IC-Pred 12/17/2020 3.71  L Preliminary     IC-Pre 12/17/2020 2.37  L Preliminary     IC-%Pred-Pre 12/17/2020 63  % Preliminary     ERV-Pred 12/17/2020 0.60  L Preliminary     ERV-Pre 12/17/2020 0.92  L Preliminary     ERV-%Pred-Pre 12/17/2020 154  % Preliminary     FEV1FEV6-Pred 12/17/2020 78  % Preliminary     FEV1FEV6-Pre 12/17/2020 74  % Preliminary     DLCOunc-Pred 12/17/2020 24.05  ml/min/mmHg Preliminary     DLCOunc-Pre 12/17/2020 16.42  ml/min/mmHg Preliminary     DLCOunc-%Pred-Pre 12/17/2020 68  % Preliminary     DLCOcor-Pre 12/17/2020 19.84  ml/min/mmHg Preliminary     DLCOcor-%Pred-Pre 12/17/2020 82  % Preliminary     VA-Pre 12/17/2020 5.10  L Preliminary     VA-%Pred-Pre 12/17/2020 88  % Preliminary     FEV1SVC-Pred 12/17/2020 70  % Preliminary     FEV1SVC-Pre 12/17/2020 73  % Preliminary     Imaging  ICD Check  Patient initiated remote ICD transmission received and reviewed.  Device transmission sent per MD orders.  Patient has a  "Medtronic single lead ICD.  Normal ICD function.  43 AF episodes recorded since 3/21/20, longest of 56 minutes, AF burden = 3.1%.  No ventricular arrhythmias recorded.  ICD settings:  Monitor zone @ 176-222 bpm, VF zone @ 222 bpm.   Presenting EGM = Irregular VS @  bpm.    =<0.1%.  OptiVol fluid index is establishing baseline. Estimated battery longevity to PERLA = 11.5 years.   Battery voltage = 3.15V.  No short v-v intervals recorded. Lead trends appear stable.  Patient had an episode of \"dizziness and clamminess over the weekend\" per LVAD coordinator, Valeria.  Patient is scheduled for appointment with STEW Padron today.  Plan for patient to return to clinic in 3 months as scheduled.  DEIDRA Marcos RN.     12/17/2020  Patient seen in clinic for evaluation and iterative programming of his Medtronic single lead ICD per MD orders. Patient has an appointment to see Dr. Cisneros today. Normal ICD function. 213 episodes recorded as AT/AF - 6-24 minutes. AF burden = 0.7%. Patient is taking Warfarin. No ventricular arrhythmias recorded. Intrinsic rhythm = VS @ 110 bpm.  = <1%. OptiVol fluid index is at baseline. Estimated battery longevity to PERLA = 11.1 years. No short v-v intervals recorded. Lead trends appear stable. Patient reports that he is feeling fine today. Plan for patient to return to clinic in 3 months. ALISTAIR Wang RN       CXR 3/9/2020:  1.  Stable supportive lines and tubes.  2.  Cardiomegaly with LVAD placement, stable.  3.  Right apical hemothorax evacuation. Right mid/lower lobe  subsegmental atelectasis, overall stable.     CT Chest 3/4/2020:  1. Grossly stable large right apical hemothorax compared to recent  radiographs. Tiny nondisplaced fractures of the medial right first and  second ribs. Tiny right pneumothorax.   2. Intrafissural position of the right basilar chest tube abutting the  pericardium and with the tip immediately inferior to the right hilar  vasculature.   3. Small left " pleural effusion, which also contains hyperdense fluid  suspicious for small component of left-sided hemothorax.  4. Stable retrosternal hematoma.  5. Cardiomegaly with small pericardial effusion versus  hemopericardium. Stable LVAD and pericardial drain.     Echo 2/25/2020:  Left ventricular size is normal. Severely reduced left ventricular systolic function. Septum is midline  LVAD inflow or outflow cannulae not visualized. Doppler velocities are not elevated.  Moderate to severely reduced right ventricular systolic function.  Aortic valve appears closed during the entire cardiac cycle. Trace aortic  insufficiency is present. IVC is plethoric.  No pericardial effusion present.     Assessment and Plan:   Eliseo Tanner is a 64 year old male with chronic systolic heart failure secondary to NICM now s/p HM 3 on 2/18, moderate CAD, HTN, ABHINAV on CPAP, DM2, CKD Stage III, ANA. His HM3 post-op course was complicated by retrosternal hematoma, RV failure,VT in ICU now on amiodarone and Afib w/AVR S/p DCCV on 2/28. He had pre-op proteus and enterococcus bacteremia from 2/13, s/p abx. He had an ICD placed on 3/16/2020 and is followed in [facility] clinic.  He has been doing very well since his LVAD implantation and continues to do well.  There is no concerning signs or symptoms of stroke or bleeding.  We will decrease his bumex to 2 mg in am and 1 mg in pm as he looks on the dry side today. Will plan for RHC to evaluate volume status.    Reading clinic if further medication adjustments are needed.  Blood work reviewed from today and creatinine has increased.  Discussed about COVID vaccine too.      Chronic SCHF secondary to NICM s/p HM III with RV dysfunction. Implanted 2/18 and complicated by bleeding. Echo at 5600 rpm notes mild-moderate RV dysfunction with AV closed and trace AI, current speed 5500 rpm.   Stage D, NYHA Class IIIB  ACEi/ARB Lisinopril 10 mg po daily. Increase Hydralazine 100 mg po TID.   BB Defer  s/p recent LVAD w/right heart failure on digoxin  Aldosterone antagonist deferred while other medical therapy is prioritized  SCD prophylaxis ICD implanted 3/16.  Fluid status: adjust bumex to 2/1 mg PO as kidney function is worse   LDH: stable at 290s  Anticoagulation: Coumadin per pharmacy. Goal 2-3.  Antiplatelet: ASA 81 mg po daily  - Continue Digoxin for RV support.  - Will plan for RHC to evaluate volume status.   - C/w abx till 12/27 for bacteremia , will try to send wound Cx today . Will f/u with ID.      VAD interrogation today: VAD interrogation reviewed with VAD coordinator. Agree with findings. Rare PI events, no power spikes, speed drops, or other findings suspicious of pump malfunction noted.      HTN.   - Continue Lisinopril   - Increase hydralazine to three times a da    New Onset Afib s/p DCCV/history of Aflutter. ECG consistent with Afib w/ RVR and aberrancy vs. MCC vs. AT vs. Slow VT. S/p DCCV on 2/28 back into sinus rhythm.  - Coumadin as above.   - Continue Digoxin to support RV. Creatinine is increased. Will recheck in 1 week.      Retrosternal Hematoma. CT chest 2/26: measuring 5.1 cm. S/P chest tube per CVTS 3/5, d/c'ed 3/15. Chest X-ray 3/18 with stable fluid to right interlobular fissure, Hgb stable.   - Follow with CVTS      Candace Alfaro MD    Discussed with Dr Cisneros    I have seen and evaluated the patient and agree with the assessment and plan as above.      Please do not hesitate to contact me if you have any questions/concerns.     Sincerely,     Nader Cisneros MD

## 2020-12-17 NOTE — LETTER
Patient Name: Eliseo Tanner   : 1955   Diagnosis/ICD-10: Heart Failure, unspecified I50.9; LVAD Z95.811   Requesting Physician: Dr. Nader Cisneros   Date of Request: 20   Date to Draw Please draw with next INR             **Please fax results to Joana Shields RN VAD Coord at [phone].                               Call [phone] with Questions    ORDER CODE TESTS NANCY.VOL.   X CBASIC Na, K, Cl, CO2, Crea, BUN, Glu, Ca GG 0.5-1    BHEPAT Alb, AlkP, ALT, AST, BBil, TP GG 0.5-1    BLIP Chol, Trig, HDL, LDL GG 1-2    CCOMP Na, K, Cl, CO2, Crea, BUN, Glu, Ca, Alb, AlkP, ALT, AST, Bbil, TP,  GG 0.6-1    HGP CBC & Platelet P 0.3-1    HGDP CBC, Differential & Platelet P 0.3-1    PLT Platelet  P 0.3-1    INR INR B 2.7    PTT PTT B 2.7    LD Lactate Dehydrogenase GG 0.3-1    PHGB Plasma Hemoglobin GG 2-3    Pre-Albumin Pre-Albumin RG 1.0                   Signed,      Nader Cisneros MD  Heart Failure, Mechanical Circulatory Support and Transplant Cardiology   of Medicine,  Division of Cardiology, [facility]

## 2020-12-17 NOTE — NURSING NOTE
1). PUMP DATA  Primary controller serial number: [id number]    HM 3:   Flow: 4.6 L/min,    Speed: 5600 RPMs,     PI: 3.9 ,  Power: 4.5 Driver, Hct: 33     Primary controller   Back up battery: Patient use: 15, Replace in: 22  Months     Data downloaded: No   Equipment and driveline assessed for damage: Yes     Back up : Serial number: [id number]  Back up battery: Patient use: 11 Replace in: 22  Months  Programmed settings identical to the settings on the primary controller : N/A      Education complete: Yes   Charge the BACKUP controller s backup battery every 6 months  Perform a self test on BACKUP every 6 months  Change the MPU s batteries every 6 months:Yes      2). ALARMS  Alarms reported by patient since last pump evaluation: No  Alarms or other finding noted during pump data history and alarm download: Pt has frequent PI events at night, and occasional PI events during the day. PIs range from 2-5s. There are rare speed drops associated with the PI events. Controller history only goes back for about 2 days. There are no alarms on his controller history.     Action Taken:  Reviewed data with patient: Yes      3). DRESSING CHANGE / DRIVELINE ASSESSMENT  Dressing change completed today: Yes  Appearance of Driveline site: Pt has a moderate amount of serous drainage on the gauze. There is some thick, yellow drainage at the driveline exit site. This was cultured. There is minimal redness at the site. Pt does not have any tenderness.     Driveline stabilization: Method: [employer]  [ Teaching reinforced on need for stabilization of Driveline. ]

## 2020-12-17 NOTE — NURSING NOTE
Chief Complaint   Patient presents with     Follow Up     LVAD 3 month return device and labs prior      Vitals were taken and medications were reconciled.     Bev Grayson RMA  9:04 AM

## 2020-12-17 NOTE — PROGRESS NOTES
INR today is unexpected.  Result is 2.66.  Patient will continue with 4mg daily and plan to recheck his on on 12/22 as planned.

## 2020-12-18 ENCOUNTER — HOSPITAL ENCOUNTER (OUTPATIENT)
Facility: CLINIC | Age: 65
DRG: 870 | End: 2020-12-18
Attending: INTERNAL MEDICINE | Admitting: INTERNAL MEDICINE
Payer: MEDICARE

## 2020-12-18 DIAGNOSIS — I50.84 ACC/AHA STAGE D HEART FAILURE (H): ICD-10-CM

## 2020-12-18 DIAGNOSIS — I10 BENIGN ESSENTIAL HYPERTENSION: ICD-10-CM

## 2020-12-18 DIAGNOSIS — N18.32 STAGE 3B CHRONIC KIDNEY DISEASE (H): ICD-10-CM

## 2020-12-18 DIAGNOSIS — I42.8 NONISCHEMIC CARDIOMYOPATHY (H): ICD-10-CM

## 2020-12-18 DIAGNOSIS — Z95.811 LVAD (LEFT VENTRICULAR ASSIST DEVICE) PRESENT (H): ICD-10-CM

## 2020-12-18 DIAGNOSIS — E78.2 MIXED HYPERLIPIDEMIA: ICD-10-CM

## 2020-12-18 DIAGNOSIS — L08.9 WOUND INFECTION: ICD-10-CM

## 2020-12-18 DIAGNOSIS — T14.8XXA WOUND INFECTION: ICD-10-CM

## 2020-12-18 DIAGNOSIS — I50.22 CHRONIC SYSTOLIC CONGESTIVE HEART FAILURE (H): ICD-10-CM

## 2020-12-18 LAB
DLCOCOR-%PRED-PRE: 82 %
DLCOCOR-PRE: 19.84 ML/MIN/MMHG
DLCOUNC-%PRED-PRE: 68 %
DLCOUNC-PRE: 16.42 ML/MIN/MMHG
DLCOUNC-PRED: 24.05 ML/MIN/MMHG
ERV-%PRED-PRE: 154 %
ERV-PRE: 0.92 L
ERV-PRED: 0.6 L
EXPTIME-PRE: 6.99 SEC
FEF2575-%PRED-PRE: 74 %
FEF2575-PRE: 1.8 L/SEC
FEF2575-PRED: 2.42 L/SEC
FEFMAX-%PRED-PRE: 62 %
FEFMAX-PRE: 4.96 L/SEC
FEFMAX-PRED: 7.98 L/SEC
FEV1-%PRED-PRE: 80 %
FEV1-PRE: 2.42 L
FEV1FEV6-PRE: 74 %
FEV1FEV6-PRED: 78 %
FEV1FVC-PRE: 73 %
FEV1FVC-PRED: 77 %
FEV1SVC-PRE: 73 %
FEV1SVC-PRED: 70 %
FIFMAX-PRE: 5.19 L/SEC
FVC-%PRED-PRE: 84 %
FVC-PRE: 3.31 L
FVC-PRED: 3.93 L
GRAM STN SPEC: NORMAL
GRAM STN SPEC: NORMAL
IC-%PRED-PRE: 63 %
IC-PRE: 2.37 L
IC-PRED: 3.71 L
MDC_IDC_EPISODE_DTM: NORMAL
MDC_IDC_EPISODE_DURATION: 1080 S
MDC_IDC_EPISODE_DURATION: 1440 S
MDC_IDC_EPISODE_DURATION: 360 S
MDC_IDC_EPISODE_DURATION: 480 S
MDC_IDC_EPISODE_DURATION: 960 S
MDC_IDC_EPISODE_ID: 206
MDC_IDC_EPISODE_ID: 207
MDC_IDC_EPISODE_ID: 208
MDC_IDC_EPISODE_ID: 209
MDC_IDC_EPISODE_ID: 210
MDC_IDC_EPISODE_ID: 211
MDC_IDC_EPISODE_ID: 212
MDC_IDC_EPISODE_ID: 213
MDC_IDC_EPISODE_ID: 214
MDC_IDC_EPISODE_ID: 215
MDC_IDC_EPISODE_ID: 216
MDC_IDC_EPISODE_ID: 217
MDC_IDC_EPISODE_TYPE: NORMAL
MDC_IDC_LEAD_IMPLANT_DT: NORMAL
MDC_IDC_LEAD_LOCATION: NORMAL
MDC_IDC_LEAD_LOCATION_DETAIL_1: NORMAL
MDC_IDC_LEAD_MFG: NORMAL
MDC_IDC_LEAD_MODEL: NORMAL
MDC_IDC_LEAD_POLARITY_TYPE: NORMAL
MDC_IDC_LEAD_SERIAL: NORMAL
MDC_IDC_LEAD_SPECIAL_FUNCTION: NORMAL
MDC_IDC_MSMT_BATTERY_DTM: NORMAL
MDC_IDC_MSMT_BATTERY_REMAINING_LONGEVITY: 134 MO
MDC_IDC_MSMT_BATTERY_RRT_TRIGGER: 2.73
MDC_IDC_MSMT_BATTERY_STATUS: NORMAL
MDC_IDC_MSMT_BATTERY_VOLTAGE: 3.05 V
MDC_IDC_MSMT_CAP_CHARGE_DTM: NORMAL
MDC_IDC_MSMT_CAP_CHARGE_ENERGY: 18 J
MDC_IDC_MSMT_CAP_CHARGE_TIME: 3.8
MDC_IDC_MSMT_CAP_CHARGE_TYPE: NORMAL
MDC_IDC_MSMT_LEADCHNL_RV_IMPEDANCE_VALUE: 342 OHM
MDC_IDC_MSMT_LEADCHNL_RV_IMPEDANCE_VALUE: 418 OHM
MDC_IDC_MSMT_LEADCHNL_RV_PACING_THRESHOLD_AMPLITUDE: 0.5 V
MDC_IDC_MSMT_LEADCHNL_RV_PACING_THRESHOLD_PULSEWIDTH: 0.4 MS
MDC_IDC_MSMT_LEADCHNL_RV_SENSING_INTR_AMPL: 12.5 MV
MDC_IDC_MSMT_LEADCHNL_RV_SENSING_INTR_AMPL: 9.88 MV
MDC_IDC_PG_IMPLANT_DTM: NORMAL
MDC_IDC_PG_MFG: NORMAL
MDC_IDC_PG_MODEL: NORMAL
MDC_IDC_PG_SERIAL: NORMAL
MDC_IDC_PG_TYPE: NORMAL
MDC_IDC_SESS_CLINIC_NAME: NORMAL
MDC_IDC_SESS_DTM: NORMAL
MDC_IDC_SESS_TYPE: NORMAL
MDC_IDC_SET_BRADY_HYSTRATE: NORMAL
MDC_IDC_SET_BRADY_LOWRATE: 40 {BEATS}/MIN
MDC_IDC_SET_BRADY_MODE: NORMAL
MDC_IDC_SET_LEADCHNL_RV_PACING_AMPLITUDE: 2 V
MDC_IDC_SET_LEADCHNL_RV_PACING_ANODE_ELECTRODE_1: NORMAL
MDC_IDC_SET_LEADCHNL_RV_PACING_ANODE_LOCATION_1: NORMAL
MDC_IDC_SET_LEADCHNL_RV_PACING_CAPTURE_MODE: NORMAL
MDC_IDC_SET_LEADCHNL_RV_PACING_CATHODE_ELECTRODE_1: NORMAL
MDC_IDC_SET_LEADCHNL_RV_PACING_CATHODE_LOCATION_1: NORMAL
MDC_IDC_SET_LEADCHNL_RV_PACING_POLARITY: NORMAL
MDC_IDC_SET_LEADCHNL_RV_PACING_PULSEWIDTH: 0.4 MS
MDC_IDC_SET_LEADCHNL_RV_SENSING_ANODE_ELECTRODE_1: NORMAL
MDC_IDC_SET_LEADCHNL_RV_SENSING_ANODE_LOCATION_1: NORMAL
MDC_IDC_SET_LEADCHNL_RV_SENSING_CATHODE_ELECTRODE_1: NORMAL
MDC_IDC_SET_LEADCHNL_RV_SENSING_CATHODE_LOCATION_1: NORMAL
MDC_IDC_SET_LEADCHNL_RV_SENSING_POLARITY: NORMAL
MDC_IDC_SET_LEADCHNL_RV_SENSING_SENSITIVITY: 0.3 MV
MDC_IDC_SET_ZONE_DETECTION_BEATS_DENOMINATOR: 40 {BEATS}
MDC_IDC_SET_ZONE_DETECTION_BEATS_NUMERATOR: 30 {BEATS}
MDC_IDC_SET_ZONE_DETECTION_INTERVAL: 270 MS
MDC_IDC_SET_ZONE_DETECTION_INTERVAL: 340 MS
MDC_IDC_SET_ZONE_DETECTION_INTERVAL: 360 MS
MDC_IDC_SET_ZONE_DETECTION_INTERVAL: NORMAL
MDC_IDC_SET_ZONE_TYPE: NORMAL
MDC_IDC_STAT_AT_BURDEN_PERCENT: 0.7 %
MDC_IDC_STAT_AT_DTM_END: NORMAL
MDC_IDC_STAT_AT_DTM_START: NORMAL
MDC_IDC_STAT_BRADY_DTM_END: NORMAL
MDC_IDC_STAT_BRADY_DTM_START: NORMAL
MDC_IDC_STAT_BRADY_RV_PERCENT_PACED: 0 %
MDC_IDC_STAT_EPISODE_RECENT_COUNT: 0
MDC_IDC_STAT_EPISODE_RECENT_COUNT: 213
MDC_IDC_STAT_EPISODE_RECENT_COUNT_DTM_END: NORMAL
MDC_IDC_STAT_EPISODE_RECENT_COUNT_DTM_START: NORMAL
MDC_IDC_STAT_EPISODE_TOTAL_COUNT: 0
MDC_IDC_STAT_EPISODE_TOTAL_COUNT: 217
MDC_IDC_STAT_EPISODE_TOTAL_COUNT_DTM_END: NORMAL
MDC_IDC_STAT_EPISODE_TOTAL_COUNT_DTM_START: NORMAL
MDC_IDC_STAT_EPISODE_TYPE: NORMAL
MDC_IDC_STAT_TACHYTHERAPY_ATP_DELIVERED_RECENT: 0
MDC_IDC_STAT_TACHYTHERAPY_ATP_DELIVERED_TOTAL: 0
MDC_IDC_STAT_TACHYTHERAPY_RECENT_DTM_END: NORMAL
MDC_IDC_STAT_TACHYTHERAPY_RECENT_DTM_START: NORMAL
MDC_IDC_STAT_TACHYTHERAPY_SHOCKS_ABORTED_RECENT: 0
MDC_IDC_STAT_TACHYTHERAPY_SHOCKS_ABORTED_TOTAL: 0
MDC_IDC_STAT_TACHYTHERAPY_SHOCKS_DELIVERED_RECENT: 0
MDC_IDC_STAT_TACHYTHERAPY_SHOCKS_DELIVERED_TOTAL: 0
MDC_IDC_STAT_TACHYTHERAPY_TOTAL_DTM_END: NORMAL
MDC_IDC_STAT_TACHYTHERAPY_TOTAL_DTM_START: NORMAL
SPECIMEN SOURCE: NORMAL
VA-%PRED-PRE: 88 %
VA-PRE: 5.1 L
VC-%PRED-PRE: 76 %
VC-PRE: 3.29 L
VC-PRED: 4.3 L

## 2020-12-18 RX ORDER — LIDOCAINE 40 MG/G
CREAM TOPICAL
Status: CANCELLED | OUTPATIENT
Start: 2020-12-18

## 2020-12-20 LAB
BACTERIA SPEC CULT: ABNORMAL
Lab: ABNORMAL
SPECIMEN SOURCE: ABNORMAL

## 2020-12-22 ENCOUNTER — EXTERNAL ORDER RESULTS (OUTPATIENT)
Dept: INFECTIOUS DISEASES | Facility: CLINIC | Age: 65
End: 2020-12-22

## 2020-12-22 LAB
ANION GAP SERPL CALCULATED.3IONS-SCNC: 11 MMOL/L
BASOPHILS - ABS (DIFF) - HISTORICAL: 0.04 K/UL (ref 0–0.2)
BASOPHILS NFR BLD AUTO: 0.6 % (ref 0–2)
BUN SERPL-MCNC: 74 MG/DL (ref 9–20)
BUN/CREATININE RATIO: 31
CALCIUM SERPL-MCNC: 9.4 MG/DL (ref 8.4–10.7)
CHLORIDE SERPLBLD-SCNC: 101 MMOL/L (ref 98–107)
CO2 SERPL-SCNC: 24 MMOL/L (ref 22–30)
CREAT SERPL-MCNC: 2.4 MG/DL (ref 0.6–1.2)
EOSINOPHIL COUNT (ABSOLUTE): 0.26 K/UL (ref 0–0.7)
EOSINOPHIL NFR BLD AUTO: 3.9 % (ref 0–6)
ERYTHROCYTE [DISTWIDTH] IN BLOOD BY AUTOMATED COUNT: 23.4 % (ref 11.6–14.8)
GFR SERPL CREATININE-BSD FRML MDRD: 29 ML/MIN/1.73M2
GLUCOSE SERPL-MCNC: 128 MG/DL (ref 74–106)
HCT VFR BLD AUTO: 33.1 % (ref 40–50)
HEMOGLOBIN: 10 G/DL (ref 13.5–17.5)
LYMPHOCYTES # BLD AUTO: 1.2 K/UL (ref 0.8–4.1)
LYMPHOCYTES NFR BLD AUTO: 17.9 % (ref 16–43)
MCH RBC QN AUTO: 21.1 PG (ref 25.5–34)
MCHC RBC AUTO-ENTMCNC: 30.1 G/DL (ref 31.5–36.5)
MCV RBC AUTO: 70 FL (ref 80–98)
MONOCYTES # BLD AUTO: 0.69 K/UL (ref 0–1)
MONOCYTES NFR BLD AUTO: 10.3 % (ref 3–10)
NEUTROPHILS # BLD AUTO: 4.52 K/UL (ref 1.8–8)
NEUTROPHILS NFR BLD AUTO: 67.3 % (ref 45–77)
PLATELET # BLD AUTO: 364 K/UL (ref 140–400)
POTASSIUM SERPL-SCNC: 4.6 MMOL/L (ref 3.5–5.1)
RBC # BLD AUTO: 4.74 M/UL (ref 4.4–5.8)
SODIUM SERPL-SCNC: 136 MMOL/L (ref 137–145)
WBC # BLD AUTO: 6.7 K/UL (ref 4–11)

## 2020-12-24 ENCOUNTER — CARE COORDINATION (OUTPATIENT)
Dept: CARDIOLOGY | Facility: CLINIC | Age: 65
End: 2020-12-24

## 2020-12-24 LAB
BACTERIA SPEC CULT: NORMAL
Lab: NORMAL
SPECIMEN SOURCE: NORMAL

## 2020-12-24 NOTE — PROGRESS NOTES
D: follow up labs rcvd.  Creat improving 2.4 from 2.7. Eliseo reports weight 204-205 (which is improved) Driveline exit site drainage improving.  I:  Situation discussed with Dr. Cisneros.  Plan: no change to regimen. Repeat labs next Tuesday.  Labs ordered and faxed.  A:  Lab follow up   P:  Pt verbalized understanding of the instructions given.  Will call VAD coordinator with further needs and questions.

## 2020-12-24 NOTE — LETTER
Patient Name: Eliseo Tanner   : 1955   Diagnosis/ICD-10: Heart Failure, unspecified I50.9; LVAD Z95.811   Requesting Physician: Dr. Nader Cisneros   Date of Request: 20   Date to Draw Please draw with next INR, approximately 2020             **Please fax results to Gladys VANESSA RN VAD Coord at [phone].                               Call [phone] with Questions    ORDER CODE TESTS NANCY.VOL.   X CBASIC Na, K, Cl, CO2, Crea, BUN, Glu, Ca GG 0.5-1    BHEPAT Alb, AlkP, ALT, AST, BBil, TP GG 0.5-1    BLIP Chol, Trig, HDL, LDL GG 1-2    CCOMP Na, K, Cl, CO2, Crea, BUN, Glu, Ca, Alb, AlkP, ALT, AST, Bbil, TP,  GG 0.6-1    HGP CBC & Platelet P 0.3-1    HGDP CBC, Differential & Platelet P 0.3-1    PLT Platelet  P 0.3-1    INR INR B 2.7    PTT PTT B 2.7    LD Lactate Dehydrogenase GG 0.3-1    PHGB Plasma Hemoglobin GG 2-3    Pre-Albumin Pre-Albumin RG 1.0                   Signed,      Nader Cisneros MD  Heart Failure, Mechanical Circulatory Support and Transplant Cardiology   of Medicine,  Division of Cardiology, [facility]

## 2020-12-28 ENCOUNTER — TELEPHONE (OUTPATIENT)
Dept: INFECTIOUS DISEASES | Facility: CLINIC | Age: 65
End: 2020-12-28

## 2020-12-28 NOTE — TELEPHONE ENCOUNTER
[facility] Call Center    Phone Message    May a detailed message be left on voicemail: yes     Reason for Call:     Hiral from [place] requesting an order for picc line removal.  Fax    Phone        Action Taken: Message routed to:  [facility] id    Travel Screening: Not Applicable

## 2020-12-28 NOTE — TELEPHONE ENCOUNTER
Patient is calling to Follow-up on message below. Patient's is at the hospital waiting for orders. Please call if there's any questions. Thank you.

## 2020-12-28 NOTE — TELEPHONE ENCOUNTER
[facility] Call Center    Phone Message    May a detailed message be left on voicemail: no     Reason for Call: Other: Hiral with [facility] called to follow up again on her request for an order to remove the Pt's picc line. She stated that the Pt is still waiting there, and she's not sure if they should continue waiting for the order or if he should go home? Writer called clinic priority line, but got no answer. Please follow up with Hiral PELLETIER. See previous message for phone number and fax.     Action Taken: Message routed to:  [facility] ID    Travel Screening: Not Applicable

## 2020-12-29 LAB — INR PPP: 2.2 (ref 0.9–1.1)

## 2020-12-29 NOTE — TELEPHONE ENCOUNTER
Hiral is calling back about the pic line. Hiral is needing the order ASAP, please advise     Fax to : [phone]

## 2020-12-29 NOTE — TELEPHONE ENCOUNTER
Faxed PICC removal order to fax # below. Will send pt a MyChart msg to remind him to start PO Keflex today. Also requested on PICC removal order to remind pt of Keflex prescription.  Antonette Pittman RN

## 2020-12-31 ENCOUNTER — CARE COORDINATION (OUTPATIENT)
Dept: CARDIOLOGY | Facility: CLINIC | Age: 65
End: 2020-12-31

## 2020-12-31 NOTE — PROGRESS NOTES
D:  Pt had repeat labs after episode of elevated creatinine.  Creatinine stable at 2.4.    I:  Pt's Cardiologist, Dr. Cisneros notified.

## 2021-01-04 ENCOUNTER — ANTICOAGULATION THERAPY VISIT (OUTPATIENT)
Dept: ANTICOAGULATION | Facility: CLINIC | Age: 66
End: 2021-01-04

## 2021-01-04 ENCOUNTER — ANCILLARY PROCEDURE (OUTPATIENT)
Dept: CARDIOLOGY | Facility: CLINIC | Age: 66
DRG: 640 | End: 2021-01-04
Attending: NURSE PRACTITIONER
Payer: MEDICARE

## 2021-01-04 ENCOUNTER — HEALTH MAINTENANCE LETTER (OUTPATIENT)
Age: 66
End: 2021-01-04

## 2021-01-04 ENCOUNTER — CARE COORDINATION (OUTPATIENT)
Dept: CARDIOLOGY | Facility: CLINIC | Age: 66
End: 2021-01-04

## 2021-01-04 DIAGNOSIS — Z95.811 LVAD (LEFT VENTRICULAR ASSIST DEVICE) PRESENT (H): ICD-10-CM

## 2021-01-04 DIAGNOSIS — I48.0 PAROXYSMAL ATRIAL FIBRILLATION (H): ICD-10-CM

## 2021-01-04 DIAGNOSIS — I50.22 CHRONIC SYSTOLIC CONGESTIVE HEART FAILURE (H): ICD-10-CM

## 2021-01-04 DIAGNOSIS — Z95.810 S/P ICD (INTERNAL CARDIAC DEFIBRILLATOR) PROCEDURE: ICD-10-CM

## 2021-01-04 PROCEDURE — 93296 REM INTERROG EVL PM/IDS: CPT

## 2021-01-04 PROCEDURE — 93295 DEV INTERROG REMOTE 1/2/MLT: CPT | Performed by: INTERNAL MEDICINE

## 2021-01-04 NOTE — PROGRESS NOTES
ANTICOAGULATION FOLLOW-UP CLINIC VISIT    Patient Name:  Eliseo Tanner  Date:  2021  Contact Type:  Telephone    SUBJECTIVE:  Patient Findings     Comments:  INR done on  and was not faxed to the Coumadin clinic. Writer explained to pt to give us a call if he has not heard from us. Pt communicated understanding         Clinical Outcomes     Comments:  INR done on  and was not faxed to the Coumadin clinic. Writer explained to pt to give us a call if he has not heard from us. Pt communicated understanding            OBJECTIVE    Recent labs: (last 7 days)     20   INR 2.2*       ASSESSMENT / PLAN  INR assessment THER    Recheck INR In: 2 WEEKS    INR Location Outside lab      Anticoagulation Summary  As of 2021    INR goal:  2.0-3.0   TTR:  90.6 % (9 mo)   INR used for dosin.2 (2020)   Warfarin maintenance plan:  4 mg (4 mg x 1) every day   Full warfarin instructions:  4 mg every day   Weekly warfarin total:  28 mg   No change documented:  Luiza Perkins, RN   Plan last modified:  Krysta Mcdaniel RN (2020)   Next INR check:  2021   Priority:  Critical   Target end date:  Indefinite    Indications    LVAD (left ventricular assist device) present (H) [Z95.811]  Chronic systolic congestive heart failure (H) [I50.22]  Paroxysmal atrial fibrillation (H) [I48.0]             Anticoagulation Episode Summary     INR check location:      Preferred lab:      Send INR reminders to:  [facility]    Comments:  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  HM 3  placed 2020, 81mg ASA, Speak to spouse, Veronique at [phone]  2020:  Lab: [facility]:  ph: [phone] opt 5;   fax: [phone]      Anticoagulation Care Providers     Provider Role Specialty Phone number    Nader Cisneros MD Referring Cardiovascular Disease [phone]            See the Encounter Report to view Anticoagulation Flowsheet  and Dosing Calendar (Go to Encounters tab in chart review, and find the Anticoagulation Therapy Visit)    Spoke with patient. Gave them their lab results and new warfarin recommendation.  No changes in health, medication, or diet. No missed doses, no falls. No signs or symptoms of bleed or clotting.     Patient had LVAD placed on:   2/18/20  Type of LVAD: HM 3  Patient's current Aspirin dose: ASA 81mg Daily  LVAD Protocol followed: Yes   If Not Followed Explanation:  N/A    Luiza Perkins RN

## 2021-01-04 NOTE — PROGRESS NOTES
"D: Pt paged VAD Coordinator on-call and reported that on 12/25, 1/1, 1/2, and today, he has had episodes of dizziness and not feeling well.  I/A: Pt reported that with each episode, he will get dizzy, his speed will drop to low speed limit of 5300, flow will increase to 5's, PI will drop to 1.7 and power will increase to 5.4. During episodes, pt also reported that his whole body feels weak and fatigued, he gets blurry vision (pt stated vision gets sensitive to light like a migraine), and he feels a \"hiccup\" in his chest somewhere between his LVAD and ICD. Pt further described the \"hiccup\" as a \"pop\" or \"like the pump is slowing down\". Pt stated that after each episode, he will nap for 2-3 hours. Pt reported weight has been stable between 203-206lbs, pt denied dark/bloody stool or dark/bloody urine.  Discussed with Dr. Cisneros. Instructed pt to sent in remote ICD interrogation. Pt verbalized understanding. Reviewed remote ICD interrogation with Dr. Cisneros. Per Dr. Cisneros, patient should be seen tomorrow or Wednesday for appt with LISA, echo, labs, and zio patch. Pt was agreeable. Instructed pt to page VAD Coord on-call with concerns or if he doesn't feel well in the meantime. Pt verbalized understanding.   P: Writer will call schedulers first thing tomorrow morning to arrange.            "

## 2021-01-05 ENCOUNTER — APPOINTMENT (OUTPATIENT)
Dept: CARDIOLOGY | Facility: CLINIC | Age: 66
DRG: 640 | End: 2021-01-05
Attending: EMERGENCY MEDICINE
Payer: MEDICARE

## 2021-01-05 ENCOUNTER — APPOINTMENT (OUTPATIENT)
Dept: CT IMAGING | Facility: CLINIC | Age: 66
DRG: 640 | End: 2021-01-05
Attending: EMERGENCY MEDICINE
Payer: MEDICARE

## 2021-01-05 ENCOUNTER — HOSPITAL ENCOUNTER (INPATIENT)
Facility: CLINIC | Age: 66
LOS: 3 days | Discharge: HOME | DRG: 640 | End: 2021-01-08
Attending: EMERGENCY MEDICINE | Admitting: INTERNAL MEDICINE
Payer: MEDICARE

## 2021-01-05 ENCOUNTER — CARE COORDINATION (OUTPATIENT)
Dept: CARDIOLOGY | Facility: CLINIC | Age: 66
End: 2021-01-05

## 2021-01-05 DIAGNOSIS — R42 DIZZINESS: ICD-10-CM

## 2021-01-05 DIAGNOSIS — I50.22 CHRONIC SYSTOLIC CONGESTIVE HEART FAILURE (H): Primary | ICD-10-CM

## 2021-01-05 DIAGNOSIS — I42.8 NONISCHEMIC CARDIOMYOPATHY (H): ICD-10-CM

## 2021-01-05 DIAGNOSIS — Z20.822 SUSPECTED COVID-19 VIRUS INFECTION: ICD-10-CM

## 2021-01-05 DIAGNOSIS — I50.23 ACUTE ON CHRONIC SYSTOLIC CONGESTIVE HEART FAILURE (H): Primary | ICD-10-CM

## 2021-01-05 PROBLEM — R55 SYNCOPE: Status: ACTIVE | Noted: 2021-01-05

## 2021-01-05 LAB
ANION GAP SERPL CALCULATED.3IONS-SCNC: 10 MMOL/L (ref 3–14)
BASOPHILS # BLD AUTO: 0.1 10E9/L (ref 0–0.2)
BASOPHILS NFR BLD AUTO: 0.6 %
BUN SERPL-MCNC: 100 MG/DL (ref 7–30)
CALCIUM SERPL-MCNC: 8.8 MG/DL (ref 8.5–10.1)
CHLORIDE SERPL-SCNC: 96 MMOL/L (ref 94–109)
CO2 SERPL-SCNC: 24 MMOL/L (ref 20–32)
CREAT SERPL-MCNC: 2.64 MG/DL (ref 0.66–1.25)
DIFFERENTIAL METHOD BLD: ABNORMAL
EOSINOPHIL # BLD AUTO: 0.1 10E9/L (ref 0–0.7)
EOSINOPHIL NFR BLD AUTO: 1.2 %
ERYTHROCYTE [DISTWIDTH] IN BLOOD BY AUTOMATED COUNT: 24.6 % (ref 10–15)
GFR SERPL CREATININE-BSD FRML MDRD: 24 ML/MIN/{1.73_M2}
GLUCOSE BLDC GLUCOMTR-MCNC: 149 MG/DL (ref 70–99)
GLUCOSE BLDC GLUCOMTR-MCNC: 154 MG/DL (ref 70–99)
GLUCOSE SERPL-MCNC: 198 MG/DL (ref 70–99)
HCT VFR BLD AUTO: 36.7 % (ref 40–53)
HGB BLD-MCNC: 10.8 G/DL (ref 13.3–17.7)
IMM GRANULOCYTES # BLD: 0.1 10E9/L (ref 0–0.4)
IMM GRANULOCYTES NFR BLD: 1 %
INR PPP: 2.16 (ref 0.86–1.14)
INTERPRETATION ECG - MUSE: NORMAL
LABORATORY COMMENT REPORT: NORMAL
LDH SERPL L TO P-CCNC: 265 U/L (ref 85–227)
LYMPHOCYTES # BLD AUTO: 0.7 10E9/L (ref 0.8–5.3)
LYMPHOCYTES NFR BLD AUTO: 9 %
MAGNESIUM SERPL-MCNC: 2.3 MG/DL (ref 1.6–2.3)
MCH RBC QN AUTO: 21.3 PG (ref 26.5–33)
MCHC RBC AUTO-ENTMCNC: 29.4 G/DL (ref 31.5–36.5)
MCV RBC AUTO: 72 FL (ref 78–100)
MONOCYTES # BLD AUTO: 0.7 10E9/L (ref 0–1.3)
MONOCYTES NFR BLD AUTO: 9 %
NEUTROPHILS # BLD AUTO: 6.1 10E9/L (ref 1.6–8.3)
NEUTROPHILS NFR BLD AUTO: 79.2 %
NRBC # BLD AUTO: 0 10*3/UL
NRBC BLD AUTO-RTO: 0 /100
PLATELET # BLD AUTO: 327 10E9/L (ref 150–450)
POTASSIUM SERPL-SCNC: 4.4 MMOL/L (ref 3.4–5.3)
RBC # BLD AUTO: 5.07 10E12/L (ref 4.4–5.9)
SARS-COV-2 RNA SPEC QL NAA+PROBE: NEGATIVE
SODIUM SERPL-SCNC: 130 MMOL/L (ref 133–144)
SPECIMEN SOURCE: NORMAL
T4 FREE SERPL-MCNC: 1.04 NG/DL (ref 0.76–1.46)
TROPONIN I SERPL-MCNC: 0.1 UG/L (ref 0–0.04)
TSH SERPL DL<=0.005 MIU/L-ACNC: 5.21 MU/L (ref 0.4–4)
WBC # BLD AUTO: 7.8 10E9/L (ref 4–11)

## 2021-01-05 PROCEDURE — 84439 ASSAY OF FREE THYROXINE: CPT | Performed by: EMERGENCY MEDICINE

## 2021-01-05 PROCEDURE — U0003 INFECTIOUS AGENT DETECTION BY NUCLEIC ACID (DNA OR RNA); SEVERE ACUTE RESPIRATORY SYNDROME CORONAVIRUS 2 (SARS-COV-2) (CORONAVIRUS DISEASE [COVID-19]), AMPLIFIED PROBE TECHNIQUE, MAKING USE OF HIGH THROUGHPUT TECHNOLOGIES AS DESCRIBED BY CMS-2020-01-R: HCPCS | Performed by: EMERGENCY MEDICINE

## 2021-01-05 PROCEDURE — 93750 INTERROGATION VAD IN PERSON: CPT

## 2021-01-05 PROCEDURE — 84484 ASSAY OF TROPONIN QUANT: CPT | Performed by: EMERGENCY MEDICINE

## 2021-01-05 PROCEDURE — U0005 INFEC AGEN DETEC AMPLI PROBE: HCPCS | Performed by: EMERGENCY MEDICINE

## 2021-01-05 PROCEDURE — C9803 HOPD COVID-19 SPEC COLLECT: HCPCS | Performed by: EMERGENCY MEDICINE

## 2021-01-05 PROCEDURE — 99223 1ST HOSP IP/OBS HIGH 75: CPT | Mod: 25 | Performed by: INTERNAL MEDICINE

## 2021-01-05 PROCEDURE — 999N001017 HC STATISTIC GLUCOSE BY METER IP

## 2021-01-05 PROCEDURE — 83615 LACTATE (LD) (LDH) ENZYME: CPT | Performed by: EMERGENCY MEDICINE

## 2021-01-05 PROCEDURE — 70450 CT HEAD/BRAIN W/O DYE: CPT | Mod: MG

## 2021-01-05 PROCEDURE — G1004 CDSM NDSC: HCPCS | Performed by: RADIOLOGY

## 2021-01-05 PROCEDURE — 70450 CT HEAD/BRAIN W/O DYE: CPT | Mod: 26 | Performed by: RADIOLOGY

## 2021-01-05 PROCEDURE — 99285 EMERGENCY DEPT VISIT HI MDM: CPT | Mod: 25 | Performed by: EMERGENCY MEDICINE

## 2021-01-05 PROCEDURE — 85025 COMPLETE CBC W/AUTO DIFF WBC: CPT | Performed by: EMERGENCY MEDICINE

## 2021-01-05 PROCEDURE — 83735 ASSAY OF MAGNESIUM: CPT | Performed by: EMERGENCY MEDICINE

## 2021-01-05 PROCEDURE — 93306 TTE W/DOPPLER COMPLETE: CPT

## 2021-01-05 PROCEDURE — 96374 THER/PROPH/DIAG INJ IV PUSH: CPT | Performed by: EMERGENCY MEDICINE

## 2021-01-05 PROCEDURE — 250N000013 HC RX MED GY IP 250 OP 250 PS 637: Performed by: STUDENT IN AN ORGANIZED HEALTH CARE EDUCATION/TRAINING PROGRAM

## 2021-01-05 PROCEDURE — 80048 BASIC METABOLIC PNL TOTAL CA: CPT | Performed by: EMERGENCY MEDICINE

## 2021-01-05 PROCEDURE — 84443 ASSAY THYROID STIM HORMONE: CPT | Performed by: EMERGENCY MEDICINE

## 2021-01-05 PROCEDURE — 250N000013 HC RX MED GY IP 250 OP 250 PS 637: Performed by: INTERNAL MEDICINE

## 2021-01-05 PROCEDURE — 93005 ELECTROCARDIOGRAM TRACING: CPT | Performed by: EMERGENCY MEDICINE

## 2021-01-05 PROCEDURE — 85610 PROTHROMBIN TIME: CPT | Performed by: EMERGENCY MEDICINE

## 2021-01-05 PROCEDURE — 93010 ELECTROCARDIOGRAM REPORT: CPT | Performed by: EMERGENCY MEDICINE

## 2021-01-05 PROCEDURE — 250N000011 HC RX IP 250 OP 636: Performed by: STUDENT IN AN ORGANIZED HEALTH CARE EDUCATION/TRAINING PROGRAM

## 2021-01-05 PROCEDURE — 93306 TTE W/DOPPLER COMPLETE: CPT | Mod: 26 | Performed by: INTERNAL MEDICINE

## 2021-01-05 PROCEDURE — 214N000001 HC R&B CCU UMMC

## 2021-01-05 RX ORDER — NICOTINE POLACRILEX 4 MG
15-30 LOZENGE BUCCAL
Status: DISCONTINUED | OUTPATIENT
Start: 2021-01-05 | End: 2021-01-08 | Stop reason: HOSPADM

## 2021-01-05 RX ORDER — CEPHALEXIN 500 MG/1
500 CAPSULE ORAL 2 TIMES DAILY
Status: DISCONTINUED | OUTPATIENT
Start: 2021-01-05 | End: 2021-01-08 | Stop reason: HOSPADM

## 2021-01-05 RX ORDER — AMOXICILLIN 250 MG
1 CAPSULE ORAL 2 TIMES DAILY PRN
Status: ON HOLD | COMMUNITY
End: 2023-01-01

## 2021-01-05 RX ORDER — ATORVASTATIN CALCIUM 20 MG/1
20 TABLET, FILM COATED ORAL AT BEDTIME
Status: DISCONTINUED | OUTPATIENT
Start: 2021-01-05 | End: 2021-01-08 | Stop reason: HOSPADM

## 2021-01-05 RX ORDER — WARFARIN SODIUM 4 MG/1
4 TABLET ORAL
Status: COMPLETED | OUTPATIENT
Start: 2021-01-05 | End: 2021-01-05

## 2021-01-05 RX ORDER — HYDRALAZINE HYDROCHLORIDE 100 MG/1
100 TABLET, FILM COATED ORAL 3 TIMES DAILY
Status: DISCONTINUED | OUTPATIENT
Start: 2021-01-05 | End: 2021-01-08 | Stop reason: HOSPADM

## 2021-01-05 RX ORDER — BUMETANIDE 0.25 MG/ML
2 INJECTION INTRAMUSCULAR; INTRAVENOUS EVERY 12 HOURS
Status: DISCONTINUED | OUTPATIENT
Start: 2021-01-05 | End: 2021-01-08 | Stop reason: HOSPADM

## 2021-01-05 RX ORDER — WARFARIN SODIUM 2 MG/1
TABLET ORAL DAILY
Status: ON HOLD | COMMUNITY
End: 2022-01-01

## 2021-01-05 RX ORDER — ALBUTEROL SULFATE 90 UG/1
2 AEROSOL, METERED RESPIRATORY (INHALATION) EVERY 6 HOURS PRN
Status: DISCONTINUED | OUTPATIENT
Start: 2021-01-05 | End: 2021-01-08 | Stop reason: HOSPADM

## 2021-01-05 RX ORDER — ASPIRIN 81 MG/1
81 TABLET, CHEWABLE ORAL DAILY
Status: DISCONTINUED | OUTPATIENT
Start: 2021-01-06 | End: 2021-01-08 | Stop reason: HOSPADM

## 2021-01-05 RX ORDER — MULTIPLE VITAMINS W/ MINERALS TAB 9MG-400MCG
1 TAB ORAL DAILY
Status: DISCONTINUED | OUTPATIENT
Start: 2021-01-06 | End: 2021-01-08 | Stop reason: HOSPADM

## 2021-01-05 RX ORDER — DEXTROSE MONOHYDRATE 25 G/50ML
25-50 INJECTION, SOLUTION INTRAVENOUS
Status: DISCONTINUED | OUTPATIENT
Start: 2021-01-05 | End: 2021-01-08 | Stop reason: HOSPADM

## 2021-01-05 RX ORDER — FLUOXETINE 10 MG/1
30 CAPSULE ORAL DAILY
Status: DISCONTINUED | OUTPATIENT
Start: 2021-01-06 | End: 2021-01-08 | Stop reason: HOSPADM

## 2021-01-05 RX ORDER — ACETAMINOPHEN 325 MG/1
650 TABLET ORAL EVERY 4 HOURS PRN
Status: DISCONTINUED | OUTPATIENT
Start: 2021-01-05 | End: 2021-01-08 | Stop reason: HOSPADM

## 2021-01-05 RX ORDER — AMOXICILLIN 250 MG
1 CAPSULE ORAL 2 TIMES DAILY
Status: DISCONTINUED | OUTPATIENT
Start: 2021-01-05 | End: 2021-01-08 | Stop reason: HOSPADM

## 2021-01-05 RX ORDER — LISINOPRIL 10 MG/1
10 TABLET ORAL DAILY
Status: DISCONTINUED | OUTPATIENT
Start: 2021-01-06 | End: 2021-01-08 | Stop reason: HOSPADM

## 2021-01-05 RX ORDER — AMIODARONE HYDROCHLORIDE 200 MG/1
200 TABLET ORAL DAILY
Status: DISCONTINUED | OUTPATIENT
Start: 2021-01-06 | End: 2021-01-08 | Stop reason: HOSPADM

## 2021-01-05 RX ORDER — ZOLPIDEM TARTRATE 5 MG/1
5 TABLET ORAL
Status: DISCONTINUED | OUTPATIENT
Start: 2021-01-05 | End: 2021-01-08 | Stop reason: HOSPADM

## 2021-01-05 RX ORDER — FLUOXETINE 10 MG/1
30 CAPSULE ORAL DAILY
Status: ON HOLD | COMMUNITY
End: 2023-01-01

## 2021-01-05 RX ORDER — ALLOPURINOL 100 MG/1
200 TABLET ORAL DAILY
Status: DISCONTINUED | OUTPATIENT
Start: 2021-01-06 | End: 2021-01-08 | Stop reason: HOSPADM

## 2021-01-05 RX ADMIN — WARFARIN SODIUM 4 MG: 4 TABLET ORAL at 20:54

## 2021-01-05 RX ADMIN — ATORVASTATIN CALCIUM 20 MG: 20 TABLET, FILM COATED ORAL at 21:12

## 2021-01-05 RX ADMIN — HYDRALAZINE HYDROCHLORIDE 100 MG: 100 TABLET, FILM COATED ORAL at 21:12

## 2021-01-05 RX ADMIN — CEPHALEXIN 500 MG: 500 CAPSULE ORAL at 20:53

## 2021-01-05 RX ADMIN — BUMETANIDE 2 MG: 0.25 INJECTION INTRAMUSCULAR; INTRAVENOUS at 20:55

## 2021-01-05 RX ADMIN — ZOLPIDEM TARTRATE 5 MG: 5 TABLET, FILM COATED ORAL at 22:42

## 2021-01-05 ASSESSMENT — ENCOUNTER SYMPTOMS
ADENOPATHY: 0
POLYDIPSIA: 0
FEVER: 0
ARTHRALGIAS: 0
HEADACHES: 0
NECK STIFFNESS: 0
BRUISES/BLEEDS EASILY: 0
EYE REDNESS: 0
CHILLS: 0
CONFUSION: 0
ABDOMINAL PAIN: 0
FATIGUE: 1
DIZZINESS: 1
LIGHT-HEADEDNESS: 0
NAUSEA: 0
NECK PAIN: 0
COLOR CHANGE: 0
SHORTNESS OF BREATH: 0
VOMITING: 0
DIFFICULTY URINATING: 0

## 2021-01-05 ASSESSMENT — MIFFLIN-ST. JEOR: SCORE: 1668.04

## 2021-01-05 NOTE — PROGRESS NOTES
D:  Called pt to check in on symptoms and status.  Pt reports feeling worse as time goes on.  He reports having some difficulties getting around due to weakness.    I:  Pt advised it would be best to come into the ER here at the [facility] for further evaluation.  Pt agreeable.  ER notified of his arrival as well as Dr. Cisneros.  A:  Worsening symptoms  P:  Pt verbalized understanding of the instructions given.  Will call VAD coordinator with further needs and questions.

## 2021-01-05 NOTE — ED TRIAGE NOTES
65yr Male patient - presenting for eval of persistent dizziness since ~ [place].  Had one fall today, while getting gas; subsequent right hip pain.  Patient remains ambulatory, with cane.  Airway patent; afebrile.

## 2021-01-05 NOTE — ED NOTES
Bed: ED23  Expected date: 1/5/21  Expected time:   Means of arrival:   Comments:  CiroEliseo wayne (MRN [id number])  Being referred by the LVAD coordinator for evaluation of increasing issues over the last month to include dizziness, pump speed changes, and weakness.  See LVAD coordinator note from 1/4/2021.  Hx:  HM3

## 2021-01-05 NOTE — H&P
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    Multiple dizziness and near syncopal episodes  Arrhythmia, unspecified  H/o Afib on warfarin, VT on amiodarone  Every episode was associated with palpitation and changes in LVAD parameteres. Interrogation of the device was done 1/4, however, still not clear which rhythm was he having given single lead ICD. EKG obtained during this visit showed regular, wide QRS without P waves concerning for possible slow VT vs Afib(hard to interprete given artifacts); changes noticed compared to prior EKG in March which showed atrial fibrillation. Denies any chest pain, orthopnea, or PND. Denies any contraction episodes or loss of consciousness episode. Neurological exams are intact. Hit his head once without traumatic lesions on exam, CT head w/o showed no acute intracranial pathology.  - EP consult placed   - BMP, Mg ordered; keep K >4, Mg >2   - TSH, INR ordered  - monitor Telemetry  - continue Amiodarone  - continue Warfarin    Decompensated heart failure  NICM with LVEF 10-15% NYHA III s/p HM III 02/20 & single lead ICD 03/20  Has been feeling fatigue. Denies any SOB, orthopnea, or PND. Weight has been in between 203-206 lbs. However, he is mildly tachycardic on exam with  CVP~13 without peripheral edema. Labs showed some worsening kidney function compared to 7 days ago. Concerning for decompensation heart failure associated with arrhythmia.    - Echo ordered, result noted below  - hold pta Bumex, start Bumex 2 mg iv q12h  - replace potassium prn given worsening kidney function, will consider schedule potassium tomorrow   - Heart failure management   ACEI/ARB: continue lisinopril 10 mg daily   Afterload reduction: hydralazine 100 mg TID   Diuretic: hold pta Bumex, start Bumex IV as above   No BB/Spironolactone given kidney function   LVAD: continue ASA, atorvastatin    Hold pta potassium supplement    #Increase creatinine elevation  Possibly BERNARDA on top CKD stage IV, could possibly be from decompensated HF.   - trend BMP  - replace lytes as needed    #H/o MSSA bacteremia with driveline infection  Previously admit in 11/2020. TTE & CHAMP without vegetation seen. Finished 6 wk course of IV abx on 12/27/20 and later switched to Cephalexin 500 mg daily on 12/28 until now. Denies fever/chills. No other localizing signs of infection.  - continue cephalexin 500 mg daily  - continue to monitor signs of infection  - CIS    Chronic conditions:  DM: continue glargine 8 unit at bedtime, Medium dose sliding scale, POCT glucose TID AC,HS  Hypertension: lisinopril, hydralazine  HLP: continue atorvastatin  ABHINAV: continue CPAP at night    Diet: Moderate consistent CHO, limited Na to < 2gm,   Fluid: no IV, continue oral fluid restriction 2L  DVT ppx: on warfarin, pharmacy to dose warfarin placed  Guevara catheter: none  Code status: obtained from previous EMR; full code     Patient staffed with Dr. Fischer.    Salena Johns MD  Internal Medicine, PGY-1         Chief Complaint:   Dizziness & near syncopal episodes     History is obtained from the patient and EMR.         History of Present Illness (Resident / Clinician):   Eliseo Tanner is a 65 year old with PMH NICM with LVEF 15-20%, NYHA III  "s/p HM III 2/18/2020(post op complicated by retrosternal hematoma with bleeding in the lungs), s/p ICD placed on 3/16/2020, h/o MSSA driveline infection and bacteremia 11/5/2020, VT/VF, moderate nonobstructive CAD, severe MR, atrial fibrillation, Hypertension, ABHINAV requiring CPAP use, CKD IV, DMII, HLP,  h/o ANA, came for evaluation of multiple episodes of dizziness and near syncopal symptoms.    On [place] day he got up and all of the sudden got really dizzy with room spinning sensation. During episodes, pt also reported that his whole body feels weak, fatigued, and warm all over. His vision would start to get blurry similar to migraine headache. He also experienced hiccup sensation between his LVAD and ICD which he described as a \"pop\" or \"like the pump is slowing down\". Hiccup usually followed by near syncopal episode. He also noticed some changes in his LVAD parameters during the episode: his speed will drop to low speed limit of 5300, flow will increase to 5's, PI will drop to 1.7 and power will increase to 5.4. Every episode lasts for about 10 mins and he usually naps for a couple hours after that. He hit his head once due to loss of balance but denies any loss of consciousness. Denies any headache, N/V. Denies any numbness or weakness in his face or body. Denies any difficulty speaking or swallowing. Denies any chest pain, SOB, orthopnea, PND, or sweating. His weight has been 203-206 lbs. Denies any associated fever, chills, shortness of breath, or other URI symptoms. No changes in his appetite. Denies bloody bowel movement, or changes in urine color/frequency.   He had another two same episodes with similar symptoms during new year day 1/1 and 1/2. Patient states he is still fairly fatigue since the episodes. He felt unwell during a follow up telephone visit today so he was advised to come in.    Remote device interrogation 1/4: a [employer] single lead ICD. Normal ICD function. No ventricular arrhythmias " recorded. 4 AT/AF episodes recorded - 6-16 minutes in duration. AF burden = 0.2%. Presenting EGM = slightly irregular ventricular rhythm @ 97 bpm.  = <0.1%. No short v-v intervals recorded. Lead trends appear stable.    Current LVAD parameter in the ED: Speed 5500, Flow 4.5, PI 4.0, Power 4.3.    Social hx:   -Quit smoking when he was 38 years old,   -Quit alcohol drinking 27 years ago; drank 12 packs of beer everyday  -Denies any   -He worked as a  quit in 2020  -Lives with his wife and his grand daughter.             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)      LVAD (left ventricular assist device) present (H)              Past Surgical History:      Past Surgical History:   Procedure Laterality Date     ANESTHESIA CARDIOVERSION N/A 2/28/2020    Procedure: ANESTHESIA, FOR CARDIOVERSION;  Surgeon: GENERIC ANESTHESIA PROVIDER;  Location: [facility] OR     CV CENTRAL VENOUS CATHETER PLACEMENT N/A 2/13/2020    Procedure: Central Venous Catheter Placement;  Surgeon: Chente Moss MD;  Location:  HEART CARDIAC CATH LAB     CV INTRA-AORTIC BALLOON PUMP INSERTION N/A 2/7/2020    Procedure: Intra-Aortic Balloon Pump Insertion;  Surgeon: Jose Baldwin MD;  Location:  HEART CARDIAC CATH LAB     CV INTRA-AORTIC BALLOON PUMP INSERTION N/A 2/13/2020    Procedure: Intra-Aortic Balloon Pump Insertion;  Surgeon: Chente Moss MD;  Location:  HEART CARDIAC CATH LAB     CV RIGHT HEART CATH N/A 9/21/2020    Procedure: CV RIGHT HEART CATH;  Surgeon: Dalton Baeza MD;  Location:  HEART CARDIAC CATH LAB     CV SWAN LUCIANA PROCEDURE N/A 2/13/2020    Procedure: [place] Luc[place] Procedure;  Surgeon: Chente Moss MD;  Location: [facility] CARDIAC  CATH LAB     EP ICD Bilateral 3/16/2020    Procedure: EP ICD;  Surgeon: Dali Day MD;  Location:  HEART CARDIAC CATH LAB     INSERT VENTRICULAR ASSIST DEVICE LEFT (HEARTMATE II) N/A 2020    Procedure: INSERTION, LEFT VENTRICULAR ASSIST DEVICE (HEARTMATE III);  Surgeon: Mac Jaramillo MD;  Location:  OR     THORACOSCOPY Right 3/6/2020    Procedure: RIGHT VIDEO-ASSISTED THORASCOPIC SURGERY, EVACUATION OF HEMOTHORAX, PLACEMENT OF CHEST TUBES;  Surgeon: William Gan MD;  Location:  OR             Social History:     Social History     Tobacco Use     Smoking status: Former Smoker     Quit date: 1994     Years since quittin.7     Smokeless tobacco: Never Used   Substance Use Topics     Alcohol use: Not on file             Family History:   No family history on file.  Family history reviewed.         Allergies:     Allergies   Allergen Reactions     Heparin      HIT screen positive 20, reflex DAVINA negative; however heme recommended treating as if positive  HIT screen negative 20     Oxycodone Itching and Other (See Comments)     Chlorhexidine Rash             Medications:    Reviewed         Review of Systems:   The Review of Systems is negative other than noted in the HPI           Physical Exam:   Vitals were reviewed  Temp: 97.6  F (36.4  C) Temp src: Oral BP: 90/64(LVAD) Pulse: 106   Resp: 16 SpO2: 97 % O2 Device: None (Room air)    GA: NAD   HEENT:nonicteric sclera,  CVP~13  Lung: clear both lungs   CVS: LVAD hums, weak peripheral pulses, irregular rate, no peripheral edema  Abd: LVAD driveline area is covered, no tenderness or discharge, normal bowel sounds, soft, nontender  Ext: no signs of infection   Neuro: A&O*3, Cranial nerves intact, no facial weakness or numbness, motor&sensory grossly intact         Data:   Labs:  -Hyponatremia 130  -Elevated creatinine 2.4-->2.64, BUN 89 --> 100  -No changes on CBC  -INR within therapeutic; 2.16  -Troponin mildly elevate 0.103; in  the setting without chest pain. Likely from straining from volume overload and current decrease in kidney function  - LDH at baseline ~ 200s  - TSH mildly elevated 5.21 with normal free T4; likely from stress    Echo complete  -Heartmate III LVAD, speed 5500 RPM  -Left ventricular size is normal. Severely (EF 10-15%) reduced left ventricular  function is present.  -LVAD cannula is not well seen in the LV apex. The outflow graft is well  visualized and Doppler is normal .  -Mild to moderate right ventricular dilation is present. Global right  ventricular function is mildly to moderately reduced.  -Aortic valve opens with every heart beat.  -The inferior vena cava was normal in size with preserved respiratory  variability.  -No pericardial effusion is present.    Patient staffed with Dr. Fischer.    Salena Johns MD   Internal Medicine, PGY-1

## 2021-01-05 NOTE — ED PROVIDER NOTES
"    [place] EMERGENCY DEPARTMENT ([facility])  January 5, 2021    History     Chief Complaint   Patient presents with     Dizziness     ~ 2 weeks; referred to ED     The history is provided by the patient and medical records.     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    Per review of the patient's medical history, the patient was admitted to the [facility] cardiology 2 service from 11/14/2020-11/19/2020 after infectious disease clinic identified GPC bacteremia, with MSSA due to driveline infection.  Patient initially developed pain followed by drainage near the LVAD, subsequently started doxycycline on 11/5, but switched to TMP/SMX when the culture revealed MSSA resistant to Doxy.  After starting TMP/SMX (11/5-11/14) there was noted to be improvement in the pain/drainage although this did not resolve.  Patient was subsequently continued on cefazolin 2 g IV every 8 hours in which infectious disease recommended from 11/15/2020-12/27/2020 with home infusion.  Most recent transesophageal echocardiogram, and had echocardiogram done on 11/14/2020 and seen below.    Patient presents to the ED today for evaluation of a syncopal episode 2/2 an episode of \"room spinning\" dizziness.  Patient reports that, around noon today, he got up and attempted to use the restroom and subsequently had a \"dizzy spell\".  He states that this did feel like the room was spinning.  Seconds later the patient reported that his vision started to get blurry \"some more like a migraine headache\", which resulted in a syncopal episode.  Patient also " "reported a \"hiccup-like feeling\" in his heart right before the syncopal episode as well.  Patient states that these dizzy spells have been intermittent since 12/25.  He also had 1 dizzy spell on 1/1, but denies any loss of consciousness associated with these episodes.  States he will usually take a nap after these, and wake up feeling back to normal.  Here in the ED, the patient states he is still fairly fatigued since the episode.  Denies any associated fever, chills, shortness of breath, or other URI symptoms.  No chest pain, abdominal pain, nausea, or vomiting.  Denies any new injury secondary to the fall earlier today.    Echo LVAD complete-11/14/2020  Interpretation Summary:  Technically difficult study.Extremely poor acoustic windows.  Limited information was obtained during study.  LVAD cannula was seen in the expected anatomic position in the LV apex.  HM3 at 5500RPM.  LVIDd 48mm.  Septum probably normal.  Aortic valve cannot be assessed.  Flow velocities not obtained.  Dilation of the inferior vena cava is present with abnormal respiratory  variation in diameter.  No pericardial effusion is present.    Transesophageal echocardiogram-11/14/2020  Interpretation Summary:  S/P HM 3 LVAD. LVAD inflow cannula with normal doppler.   No vegetations on valves or ICD wire.    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)      LVAD (left ventricular assist device) present (H)        Past Surgical History:   Procedure Laterality Date     ANESTHESIA CARDIOVERSION N/A 2/28/2020    Procedure: ANESTHESIA, FOR CARDIOVERSION;  Surgeon: GENERIC ANESTHESIA PROVIDER;  Location: [facility] OR     CV CENTRAL VENOUS CATHETER PLACEMENT N/A 2/13/2020    Procedure: Central Venous Catheter Placement;  Surgeon: Chente Moss MD;  " Location:  HEART CARDIAC CATH LAB     CV INTRA-AORTIC BALLOON PUMP INSERTION N/A 2020    Procedure: Intra-Aortic Balloon Pump Insertion;  Surgeon: Jose Baldwin MD;  Location:  HEART CARDIAC CATH LAB     CV INTRA-AORTIC BALLOON PUMP INSERTION N/A 2020    Procedure: Intra-Aortic Balloon Pump Insertion;  Surgeon: Chente Moss MD;  Location:  HEART CARDIAC CATH LAB     CV RIGHT HEART CATH N/A 2020    Procedure: CV RIGHT HEART CATH;  Surgeon: Dalton Baeza MD;  Location:  HEART CARDIAC CATH LAB     CV SWAN LUCIANA PROCEDURE N/A 2020    Procedure: [place] Luciana Procedure;  Surgeon: Chente Moss MD;  Location:  HEART CARDIAC CATH LAB     EP ICD Bilateral 3/16/2020    Procedure: EP ICD;  Surgeon: Dali Day MD;  Location:  HEART CARDIAC CATH LAB     INSERT VENTRICULAR ASSIST DEVICE LEFT (HEARTMATE II) N/A 2020    Procedure: INSERTION, LEFT VENTRICULAR ASSIST DEVICE (HEARTMATE III);  Surgeon: Mac Jaramillo MD;  Location:  OR     THORACOSCOPY Right 3/6/2020    Procedure: RIGHT VIDEO-ASSISTED THORASCOPIC SURGERY, EVACUATION OF HEMOTHORAX, PLACEMENT OF CHEST TUBES;  Surgeon: William Gan MD;  Location:  OR       No family history on file.    Social History     Tobacco Use     Smoking status: Former Smoker     Quit date: 1994     Years since quittin.7     Smokeless tobacco: Never Used   Substance Use Topics     Alcohol use: Not on file       Current Facility-Administered Medications   Medication     acetaminophen (TYLENOL) tablet 650 mg     albuterol (PROAIR HFA/PROVENTIL HFA/VENTOLIN HFA) 108 (90 Base) MCG/ACT inhaler 2 puff     [START ON 2021] allopurinol (ZYLOPRIM) tablet 200 mg     [START ON 2021] amiodarone (PACERONE) tablet 200 mg     [START ON 2021] aspirin EC tablet 81 mg     atorvastatin (LIPITOR) tablet 20 mg     bumetanide (BUMEX) injection 2 mg     cephALEXin (KEFLEX) capsule 500 mg     glucose gel 15-30  g    Or     dextrose 50 % injection 25-50 mL    Or     glucagon injection 1 mg     [START ON 1/6/2021] FLUoxetine (PROzac) capsule 20 mg     hydrALAZINE (APRESOLINE) tablet 100 mg     [START ON 1/6/2021] insulin aspart (NovoLOG) injection (RAPID ACTING)     insulin aspart (NovoLOG) injection (RAPID ACTING)     insulin glargine (LANTUS PEN) injection 8 Units     [START ON 1/6/2021] lisinopril (ZESTRIL) tablet 10 mg     [START ON 1/6/2021] multivitamin w/minerals (THERA-VIT-M) tablet 1 tablet     [START ON 1/6/2021] omeprazole (priLOSEC) CR capsule 20 mg     senna-docusate (SENOKOT-S/PERICOLACE) 8.6-50 MG per tablet 1 tablet     warfarin ANTICOAGULANT (COUMADIN) tablet 4 mg     Current Outpatient Medications   Medication     acetaminophen (TYLENOL) 325 MG tablet     allopurinol (ZYLOPRIM) 100 MG tablet     amiodarone (PACERONE) 200 MG tablet     aspirin (ASA) 81 MG EC tablet     atorvastatin (LIPITOR) 20 MG tablet     bumetanide (BUMEX) 1 MG tablet     cephALEXin (KEFLEX) 500 MG capsule     co-enzyme Q-10 200 MG CAPS     finasteride (PROSCAR) 5 MG tablet     FLUoxetine (PROZAC) 10 MG capsule     hydrALAZINE (APRESOLINE) 50 MG tablet     insulin glargine (BASAGLAR KWIKPEN) 100 UNIT/ML pen     lisinopril (ZESTRIL) 10 MG tablet     magnesium oxide (MAG-OX) 400 MG tablet     multivitamin w/minerals (THERA-VIT-M) tablet     nitroGLYcerin (NITROSTAT) 0.4 MG sublingual tablet     omeprazole (PRILOSEC) 20 MG DR capsule     potassium chloride ER (KLOR-CON M) 20 MEQ CR tablet     senna-docusate (SENOKOT-S/PERICOLACE) 8.6-50 MG tablet     warfarin ANTICOAGULANT (COUMADIN) 2 MG tablet     zolpidem (AMBIEN) 5 MG tablet     insulin pen needle (BD JAIME U/F) 32G X 4 MM miscellaneous     tamsulosin (FLOMAX) 0.4 MG capsule        Allergies   Allergen Reactions     Heparin      HIT screen positive 2/14/20, reflex DAVINA negative; however heme recommended treating as if positive  HIT screen negative 2/11/20     Oxycodone Itching and Other  "(See Comments)     Chlorhexidine Rash         Review of Systems   Constitutional: Positive for fatigue. Negative for chills and fever.   HENT: Negative for congestion.    Eyes: Negative for redness.   Respiratory: Negative for shortness of breath.    Cardiovascular: Negative for chest pain.   Gastrointestinal: Negative for abdominal pain, nausea and vomiting.   Endocrine: Negative for polydipsia and polyuria.   Genitourinary: Negative for difficulty urinating.   Musculoskeletal: Negative for arthralgias, neck pain and neck stiffness.   Skin: Negative for color change.   Allergic/Immunologic: Negative for immunocompromised state.   Neurological: Positive for dizziness. Negative for light-headedness and headaches.   Hematological: Negative for adenopathy. Does not bruise/bleed easily.   Psychiatric/Behavioral: Negative for confusion.   All other systems reviewed and are negative.      Physical Exam   BP: 90/64(LVAD)  Pulse: 106  Temp: 97.6  F (36.4  C)  Resp: 16  Height: 170.2 cm (5' 7\")  Weight: 93.4 kg (206 lb)  SpO2: 97 %  Physical Exam  Vitals signs and nursing note reviewed.   Constitutional:       General: He is not in acute distress.     Appearance: Normal appearance. He is not diaphoretic.   HENT:      Head: Normocephalic and atraumatic.   Eyes:      General: No scleral icterus.     Extraocular Movements: Extraocular movements intact.      Conjunctiva/sclera: Conjunctivae normal.      Pupils: Pupils are equal, round, and reactive to light.   Neck:      Musculoskeletal: Normal range of motion and neck supple.      Vascular: No carotid bruit.   Cardiovascular:      Pulses: Normal pulses.      Heart sounds: Normal heart sounds.   Pulmonary:      Effort: Pulmonary effort is normal. No respiratory distress.      Breath sounds: No wheezing, rhonchi or rales.   Abdominal:      General: Abdomen is flat.      Palpations: Abdomen is soft.      Tenderness: There is no abdominal tenderness. There is no right CVA " tenderness, left CVA tenderness, guarding or rebound.   Musculoskeletal: Normal range of motion.         General: No swelling or tenderness.      Right lower leg: No edema.      Left lower leg: No edema.   Lymphadenopathy:      Cervical: No cervical adenopathy.   Skin:     General: Skin is warm.      Findings: No rash.   Neurological:      General: No focal deficit present.      Mental Status: He is alert and oriented to person, place, and time.      Coordination: Coordination normal.   Psychiatric:         Mood and Affect: Mood normal.         Behavior: Behavior normal.         Thought Content: Thought content normal.         Judgment: Judgment normal.         ED Course   2:12 PM  The patient was seen and examined by Edin Adams MD in Room ED23.     Procedures             EKG Interpretation:      Interpreted by Edin Adams MD  Time reviewed: 2:06 PM  Symptoms at time of EKG: Dizziness  Rhythm: regular, tachycardia  Rate: 101  Axis: Right Axis Deviation  Ectopy: none  Conduction: right bundle branch block (complete)  ST Segments/ T Waves: No ST-T wave changes  Q Waves: none  Comparison to prior: Unchanged from old EKG done on July 31, 2020, other than the rate    Clinical Impression: Wide-complex tachycardia, possible paced rhythm, no acute ischemic changes                            Results for orders placed or performed during the hospital encounter of 01/05/21   CT Head w/o Contrast     Status: None    Narrative    CT HEAD W/O CONTRAST 1/5/2021 2:54 PM    History: Dizziness for approximately one month     Comparison: Head CT from 2/8/2020.    Technique: Using multidetector thin collimation helical acquisition  technique, axial, coronal and sagittal CT images from the skull base  to the vertex were obtained without intravenous contrast.    Findings:   No intracranial hemorrhage, mass effect, or midline shift. Gray/white  matter differentiation in both cerebral hemispheres is preserved.  Ventricles are  proportionate to the cerebral sulci. The basal cisterns  are clear. Left greater than right bilateral basal ganglia  mineralization.    The bony calvaria and the bones of the skull base are normal. The  visualized portions of the paranasal sinuses and mastoid air cells are  clear.       Impression    Impression: No acute intracranial pathology.     I have personally reviewed the examination and initial interpretation  and I agree with the findings.    DEION MAURO MD   Basic metabolic panel     Status: Abnormal   Result Value Ref Range    Sodium 130 (L) 133 - 144 mmol/L    Potassium 4.4 3.4 - 5.3 mmol/L    Chloride 96 94 - 109 mmol/L    Carbon Dioxide 24 20 - 32 mmol/L    Anion Gap 10 3 - 14 mmol/L    Glucose 198 (H) 70 - 99 mg/dL    Urea Nitrogen 100 (H) 7 - 30 mg/dL    Creatinine 2.64 (H) 0.66 - 1.25 mg/dL    GFR Estimate 24 (L) >60 mL/min/[1.73_m2]    GFR Estimate If Black 28 (L) >60 mL/min/[1.73_m2]    Calcium 8.8 8.5 - 10.1 mg/dL   CBC with platelets differential     Status: Abnormal   Result Value Ref Range    WBC 7.8 4.0 - 11.0 10e9/L    RBC Count 5.07 4.4 - 5.9 10e12/L    Hemoglobin 10.8 (L) 13.3 - 17.7 g/dL    Hematocrit 36.7 (L) 40.0 - 53.0 %    MCV 72 (L) 78 - 100 fl    MCH 21.3 (L) 26.5 - 33.0 pg    MCHC 29.4 (L) 31.5 - 36.5 g/dL    RDW 24.6 (H) 10.0 - 15.0 %    Platelet Count 327 150 - 450 10e9/L    Diff Method Automated Method     % Neutrophils 79.2 %    % Lymphocytes 9.0 %    % Monocytes 9.0 %    % Eosinophils 1.2 %    % Basophils 0.6 %    % Immature Granulocytes 1.0 %    Nucleated RBCs 0 0 /100    Absolute Neutrophil 6.1 1.6 - 8.3 10e9/L    Absolute Lymphocytes 0.7 (L) 0.8 - 5.3 10e9/L    Absolute Monocytes 0.7 0.0 - 1.3 10e9/L    Absolute Eosinophils 0.1 0.0 - 0.7 10e9/L    Absolute Basophils 0.1 0.0 - 0.2 10e9/L    Abs Immature Granulocytes 0.1 0 - 0.4 10e9/L    Absolute Nucleated RBC 0.0    INR     Status: Abnormal   Result Value Ref Range    INR 2.16 (H) 0.86 - 1.14   Troponin I     Status:  Abnormal   Result Value Ref Range    Troponin I ES 0.103 (H) 0.000 - 0.045 ug/L   Lactate Dehydrogenase     Status: Abnormal   Result Value Ref Range    Lactate Dehydrogenase 265 (H) 85 - 227 U/L   Magnesium     Status: None   Result Value Ref Range    Magnesium 2.3 1.6 - 2.3 mg/dL   Asymptomatic SARS-CoV-2 COVID-19 Virus (Coronavirus) by PCR     Status: None    Specimen: Nasopharyngeal   Result Value Ref Range    SARS-CoV-2 Virus Specimen Source Nasopharyngeal     SARS-CoV-2 PCR Result NEGATIVE     SARS-CoV-2 PCR Comment       Testing was performed using the ClickScanShareert Xpress SARS-CoV-2 Assay on the Cepheid Gene-Xpert   Instrument Systems. Additional information about this Emergency Use Authorization (EUA)   assay can be found via the Lab Guide.     TSH with free T4 reflex     Status: Abnormal   Result Value Ref Range    TSH 5.21 (H) 0.40 - 4.00 mU/L   T4 free     Status: None   Result Value Ref Range    T4 Free 1.04 0.76 - 1.46 ng/dL   Glucose by meter     Status: Abnormal   Result Value Ref Range    Glucose 154 (H) 70 - 99 mg/dL   EKG 12-lead, tracing only     Status: None   Result Value Ref Range    Interpretation ECG Click View Image link to view waveform and result    Echocardiogram Complete     Status: None    Narrative    [id number]  [id number]  [id number]  [id number]           [facility]  Echocardiography Laboratory  [address] [place] [id number]     Name: WOLFGANG LEACH  MRN: [id number]  : 1955  Study Date: 2021 02:14 PM  Age: 65 yrs  Gender: Male  Patient Location: [facility]  Reason For Study: Dizziness  Ordering Physician: MARYAM LEVY  Performed By: Kari Wang RDCS     BSA: 2.0 m2  Height: 67 in  Weight: 206 lb  HR: 100  BP: 90/64 mmHg  _____________________________________________________________________________  __        Procedure  LVAD Echocardiogram with two-dimensional, color and spectral  Doppler  performed.  _____________________________________________________________________________  __        Interpretation Summary  Heartmate III LVAD, speed 5500 RPM     Left ventricular size is normal. Severely (EF 10-15%) reduced left ventricular  function is present.     LVAD cannula is not well seen in the LV apex. The outflow graft is well  visualized and Doppler is normal .     Mild to moderate right ventricular dilation is present. Global right  ventricular function is mildly to moderately reduced.     Aortic valve opens with every heart beat.     The inferior vena cava was normal in size with preserved respiratory  variability.     No pericardial effusion is present.  _____________________________________________________________________________  __        Left Ventricle  Left ventricular size is normal. LVIDd = 5.2 cm. Severely (EF 10-15%) reduced  left ventricular function is present. LVAD cannula is not well seen in the LV  apex. The outflow graft is well visualized and Doppler is normal .     Right Ventricle  The right ventricle is not well visualized. Mild to moderate right ventricular  dilation is present. Global right ventricular function is mildly to moderately  reduced. A pacemaker lead is noted in the right ventricle.     Aortic Valve  Aortic valve opens with every heart beat. No aortic regurgitation is present.     Tricuspid Valve  Mild tricuspid insufficiency is present. The right ventricular systolic  pressure is approximated at 15.2 mmHg plus the right atrial pressure.  Pulmonary artery systolic pressure is normal.        Vessels  The inferior vena cava was normal in size with preserved respiratory  variability.     Pericardium  No pericardial effusion is present.  _____________________________________________________________________________  __           Doppler Measurements & Calculations  TR max saumya: 195.0 cm/sec  TR max PG: 15.2 mmHg      _____________________________________________________________________________  __           Report approved by: Graciela Caballero 01/05/2021 02:47 PM        Medications   glucose gel 15-30 g (has no administration in time range)     Or   dextrose 50 % injection 25-50 mL (has no administration in time range)     Or   glucagon injection 1 mg (has no administration in time range)   insulin glargine (LANTUS PEN) injection 8 Units (has no administration in time range)   insulin aspart (NovoLOG) injection (RAPID ACTING) (has no administration in time range)   insulin aspart (NovoLOG) injection (RAPID ACTING) (has no administration in time range)   bumetanide (BUMEX) injection 2 mg (has no administration in time range)   acetaminophen (TYLENOL) tablet 650 mg (has no administration in time range)   albuterol (PROAIR HFA/PROVENTIL HFA/VENTOLIN HFA) 108 (90 Base) MCG/ACT inhaler 2 puff (has no administration in time range)   allopurinol (ZYLOPRIM) tablet 200 mg (has no administration in time range)   amiodarone (PACERONE) tablet 200 mg (has no administration in time range)   aspirin EC tablet 81 mg (has no administration in time range)   atorvastatin (LIPITOR) tablet 20 mg (has no administration in time range)   cephALEXin (KEFLEX) capsule 500 mg (has no administration in time range)   FLUoxetine (PROzac) capsule 20 mg (has no administration in time range)   hydrALAZINE (APRESOLINE) tablet 100 mg (has no administration in time range)   lisinopril (ZESTRIL) tablet 10 mg (has no administration in time range)   multivitamin w/minerals (THERA-VIT-M) tablet 1 tablet (has no administration in time range)   omeprazole (priLOSEC) CR capsule 20 mg (has no administration in time range)   senna-docusate (SENOKOT-S/PERICOLACE) 8.6-50 MG per tablet 1 tablet (has no administration in time range)   warfarin ANTICOAGULANT (COUMADIN) tablet 4 mg (has no administration in time range)        Assessments & Plan (with Medical Decision Making)    65-year-old man with LVAD presenting with intermittent dizziness for several weeks.  Differential diagnosis: Electrode normalities, dehydration, arrhythmia, malfunction, ICH.    After thorough history and physical exam patient appears to be in no acute distress.  Follow-up with laboratory studies, EKG, and CT of the head along with an echocardiogram for further diagnostic evaluation.  He agrees with the plan.      Patient's laboratory studies returned with no leukocytosis, WBC is normal at 7800.  There is anemia with hemoglobin of 10.8 which is close to patient's baseline.  Electrolytes show hyponatremia with sodium 130 which is somewhat lower than the last value from a week ago.  Creatinine is elevated at 2.64, close to patient's baseline.  INR is elevated 2.16.  Troponin is elevated 0.103, however, it is chronically elevated.  LDH is 265.  Magnesium is normal at 2.3.  I reviewed his CT of the head and I read the radiology report; no evidence of intracranial hemorrhage.  I reviewed his echocardiogram and I read the cardiology report; EF is severely reduced between 10 to 15%.  LVAD cannulas not well seen in the LV apex.  There is mild to moderate right ventricular dilatation.  Global right ventricular function is mildly to moderately reduced.  No evidence of pericardial effusion.  I discussed patient's case with heart failure staff on call, Dr. Fischer; the plan was made to admit the patient to heart failure service for further evaluation.  He agrees with the plan.           I have reviewed the nursing notes. I have reviewed the findings, diagnosis, plan and need for follow up with the patient.    New Prescriptions    No medications on file       Final diagnoses:   Dizziness   Jose Roberto ROCHA, am serving as a trained medical scribe to document services personally performed by Edin Adams MD, based on the provider's statements to me.      I, Edin Adams MD, was physically present and have reviewed  and verified the accuracy of this note documented by Jose Roberto Prasad.     --  Edin Adams MD  [facility] EMERGENCY DEPARTMENT  1/5/2021     Edin Adams MD  01/05/21 1954

## 2021-01-05 NOTE — PROGRESS NOTES
"Indication of Interrogation:  Other - VAD parameter changes    Type of VAD:  Heartmate 3    Current Parameters:  Flow= 4.5 lpm, Speed= 5500 rpm, Power= 4.4 muhammad, PI (if applicable)= 4    Abnormal Alarm on History:  No    Abnormal Events/Parameters Notes:  Yes, explain: constant PI events, with history only back 1 hour. PI ranging 1.7-7.8. frequent speed drops to 5400.     Changes Made during Interrogation:  No    BP 1 hour ago 90/64, MAP 71. Faint palpable intermittent pulse at L radial site. Pt reports intermittent \"hiccup\" in chest. Denies VAD alarms, but reports frequent speed drops associated with elevated cummings over the last few weeks.     "

## 2021-01-06 ENCOUNTER — APPOINTMENT (OUTPATIENT)
Dept: OCCUPATIONAL THERAPY | Facility: CLINIC | Age: 66
DRG: 640 | End: 2021-01-06
Payer: MEDICARE

## 2021-01-06 PROBLEM — I50.23 ACUTE ON CHRONIC SYSTOLIC CONGESTIVE HEART FAILURE (H): Status: ACTIVE | Noted: 2020-01-13

## 2021-01-06 LAB
ANION GAP SERPL CALCULATED.3IONS-SCNC: 10 MMOL/L (ref 3–14)
BUN SERPL-MCNC: 105 MG/DL (ref 7–30)
CALCIUM SERPL-MCNC: 8.7 MG/DL (ref 8.5–10.1)
CHLORIDE SERPL-SCNC: 102 MMOL/L (ref 94–109)
CO2 SERPL-SCNC: 23 MMOL/L (ref 20–32)
CREAT SERPL-MCNC: 2.85 MG/DL (ref 0.66–1.25)
GFR SERPL CREATININE-BSD FRML MDRD: 22 ML/MIN/{1.73_M2}
GLUCOSE BLDC GLUCOMTR-MCNC: 114 MG/DL (ref 70–99)
GLUCOSE BLDC GLUCOMTR-MCNC: 137 MG/DL (ref 70–99)
GLUCOSE BLDC GLUCOMTR-MCNC: 140 MG/DL (ref 70–99)
GLUCOSE BLDC GLUCOMTR-MCNC: 173 MG/DL (ref 70–99)
GLUCOSE SERPL-MCNC: 145 MG/DL (ref 70–99)
HBA1C MFR BLD: 6.8 % (ref 0–5.6)
INR PPP: 2.53 (ref 0.86–1.14)
LDH SERPL L TO P-CCNC: 223 U/L (ref 85–227)
MAGNESIUM SERPL-MCNC: 2.3 MG/DL (ref 1.6–2.3)
MDC_IDC_EPISODE_DTM: NORMAL
MDC_IDC_EPISODE_DURATION: 360 S
MDC_IDC_EPISODE_DURATION: 600 S
MDC_IDC_EPISODE_DURATION: 720 S
MDC_IDC_EPISODE_DURATION: 960 S
MDC_IDC_EPISODE_ID: 218
MDC_IDC_EPISODE_ID: 219
MDC_IDC_EPISODE_ID: 220
MDC_IDC_EPISODE_ID: 221
MDC_IDC_EPISODE_TYPE: NORMAL
MDC_IDC_LEAD_IMPLANT_DT: NORMAL
MDC_IDC_LEAD_LOCATION: NORMAL
MDC_IDC_LEAD_LOCATION_DETAIL_1: NORMAL
MDC_IDC_LEAD_MFG: NORMAL
MDC_IDC_LEAD_MODEL: NORMAL
MDC_IDC_LEAD_POLARITY_TYPE: NORMAL
MDC_IDC_LEAD_SERIAL: NORMAL
MDC_IDC_LEAD_SPECIAL_FUNCTION: NORMAL
MDC_IDC_MSMT_BATTERY_DTM: NORMAL
MDC_IDC_MSMT_BATTERY_REMAINING_LONGEVITY: 134 MO
MDC_IDC_MSMT_BATTERY_RRT_TRIGGER: 2.73
MDC_IDC_MSMT_BATTERY_STATUS: NORMAL
MDC_IDC_MSMT_BATTERY_VOLTAGE: 3.05 V
MDC_IDC_MSMT_CAP_CHARGE_DTM: NORMAL
MDC_IDC_MSMT_CAP_CHARGE_ENERGY: 18 J
MDC_IDC_MSMT_CAP_CHARGE_TIME: 3.8
MDC_IDC_MSMT_CAP_CHARGE_TYPE: NORMAL
MDC_IDC_MSMT_LEADCHNL_RV_IMPEDANCE_VALUE: 304 OHM
MDC_IDC_MSMT_LEADCHNL_RV_IMPEDANCE_VALUE: 418 OHM
MDC_IDC_MSMT_LEADCHNL_RV_PACING_THRESHOLD_AMPLITUDE: 0.5 V
MDC_IDC_MSMT_LEADCHNL_RV_PACING_THRESHOLD_PULSEWIDTH: 0.4 MS
MDC_IDC_MSMT_LEADCHNL_RV_SENSING_INTR_AMPL: 10.62 MV
MDC_IDC_MSMT_LEADCHNL_RV_SENSING_INTR_AMPL: 10.62 MV
MDC_IDC_PG_IMPLANT_DTM: NORMAL
MDC_IDC_PG_MFG: NORMAL
MDC_IDC_PG_MODEL: NORMAL
MDC_IDC_PG_SERIAL: NORMAL
MDC_IDC_PG_TYPE: NORMAL
MDC_IDC_SESS_CLINIC_NAME: NORMAL
MDC_IDC_SESS_DTM: NORMAL
MDC_IDC_SESS_TYPE: NORMAL
MDC_IDC_SET_BRADY_HYSTRATE: NORMAL
MDC_IDC_SET_BRADY_LOWRATE: 40 {BEATS}/MIN
MDC_IDC_SET_BRADY_MODE: NORMAL
MDC_IDC_SET_LEADCHNL_RV_PACING_AMPLITUDE: 2 V
MDC_IDC_SET_LEADCHNL_RV_PACING_ANODE_ELECTRODE_1: NORMAL
MDC_IDC_SET_LEADCHNL_RV_PACING_ANODE_LOCATION_1: NORMAL
MDC_IDC_SET_LEADCHNL_RV_PACING_CAPTURE_MODE: NORMAL
MDC_IDC_SET_LEADCHNL_RV_PACING_CATHODE_ELECTRODE_1: NORMAL
MDC_IDC_SET_LEADCHNL_RV_PACING_CATHODE_LOCATION_1: NORMAL
MDC_IDC_SET_LEADCHNL_RV_PACING_POLARITY: NORMAL
MDC_IDC_SET_LEADCHNL_RV_PACING_PULSEWIDTH: 0.4 MS
MDC_IDC_SET_LEADCHNL_RV_SENSING_ANODE_ELECTRODE_1: NORMAL
MDC_IDC_SET_LEADCHNL_RV_SENSING_ANODE_LOCATION_1: NORMAL
MDC_IDC_SET_LEADCHNL_RV_SENSING_CATHODE_ELECTRODE_1: NORMAL
MDC_IDC_SET_LEADCHNL_RV_SENSING_CATHODE_LOCATION_1: NORMAL
MDC_IDC_SET_LEADCHNL_RV_SENSING_POLARITY: NORMAL
MDC_IDC_SET_LEADCHNL_RV_SENSING_SENSITIVITY: 0.3 MV
MDC_IDC_SET_ZONE_DETECTION_BEATS_DENOMINATOR: 40 {BEATS}
MDC_IDC_SET_ZONE_DETECTION_BEATS_NUMERATOR: 30 {BEATS}
MDC_IDC_SET_ZONE_DETECTION_INTERVAL: 270 MS
MDC_IDC_SET_ZONE_DETECTION_INTERVAL: 340 MS
MDC_IDC_SET_ZONE_DETECTION_INTERVAL: 360 MS
MDC_IDC_SET_ZONE_DETECTION_INTERVAL: NORMAL
MDC_IDC_SET_ZONE_TYPE: NORMAL
MDC_IDC_STAT_AT_BURDEN_PERCENT: 0.2 %
MDC_IDC_STAT_AT_DTM_END: NORMAL
MDC_IDC_STAT_AT_DTM_START: NORMAL
MDC_IDC_STAT_BRADY_DTM_END: NORMAL
MDC_IDC_STAT_BRADY_DTM_START: NORMAL
MDC_IDC_STAT_BRADY_RV_PERCENT_PACED: 0.01 %
MDC_IDC_STAT_EPISODE_RECENT_COUNT: 0
MDC_IDC_STAT_EPISODE_RECENT_COUNT: 4
MDC_IDC_STAT_EPISODE_RECENT_COUNT_DTM_END: NORMAL
MDC_IDC_STAT_EPISODE_RECENT_COUNT_DTM_START: NORMAL
MDC_IDC_STAT_EPISODE_TOTAL_COUNT: 0
MDC_IDC_STAT_EPISODE_TOTAL_COUNT: 221
MDC_IDC_STAT_EPISODE_TOTAL_COUNT_DTM_END: NORMAL
MDC_IDC_STAT_EPISODE_TOTAL_COUNT_DTM_START: NORMAL
MDC_IDC_STAT_EPISODE_TYPE: NORMAL
MDC_IDC_STAT_TACHYTHERAPY_ATP_DELIVERED_RECENT: 0
MDC_IDC_STAT_TACHYTHERAPY_ATP_DELIVERED_TOTAL: 0
MDC_IDC_STAT_TACHYTHERAPY_RECENT_DTM_END: NORMAL
MDC_IDC_STAT_TACHYTHERAPY_RECENT_DTM_START: NORMAL
MDC_IDC_STAT_TACHYTHERAPY_SHOCKS_ABORTED_RECENT: 0
MDC_IDC_STAT_TACHYTHERAPY_SHOCKS_ABORTED_TOTAL: 0
MDC_IDC_STAT_TACHYTHERAPY_SHOCKS_DELIVERED_RECENT: 0
MDC_IDC_STAT_TACHYTHERAPY_SHOCKS_DELIVERED_TOTAL: 0
MDC_IDC_STAT_TACHYTHERAPY_TOTAL_DTM_END: NORMAL
MDC_IDC_STAT_TACHYTHERAPY_TOTAL_DTM_START: NORMAL
POTASSIUM SERPL-SCNC: 4.3 MMOL/L (ref 3.4–5.3)
SODIUM SERPL-SCNC: 135 MMOL/L (ref 133–144)

## 2021-01-06 PROCEDURE — 250N000013 HC RX MED GY IP 250 OP 250 PS 637: Performed by: INTERNAL MEDICINE

## 2021-01-06 PROCEDURE — 999N001017 HC STATISTIC GLUCOSE BY METER IP

## 2021-01-06 PROCEDURE — 250N000012 HC RX MED GY IP 250 OP 636 PS 637: Performed by: STUDENT IN AN ORGANIZED HEALTH CARE EDUCATION/TRAINING PROGRAM

## 2021-01-06 PROCEDURE — 250N000013 HC RX MED GY IP 250 OP 250 PS 637: Performed by: STUDENT IN AN ORGANIZED HEALTH CARE EDUCATION/TRAINING PROGRAM

## 2021-01-06 PROCEDURE — 97530 THERAPEUTIC ACTIVITIES: CPT | Mod: GO | Performed by: OCCUPATIONAL THERAPIST

## 2021-01-06 PROCEDURE — 83735 ASSAY OF MAGNESIUM: CPT | Performed by: STUDENT IN AN ORGANIZED HEALTH CARE EDUCATION/TRAINING PROGRAM

## 2021-01-06 PROCEDURE — 36415 COLL VENOUS BLD VENIPUNCTURE: CPT | Performed by: STUDENT IN AN ORGANIZED HEALTH CARE EDUCATION/TRAINING PROGRAM

## 2021-01-06 PROCEDURE — 80048 BASIC METABOLIC PNL TOTAL CA: CPT | Performed by: STUDENT IN AN ORGANIZED HEALTH CARE EDUCATION/TRAINING PROGRAM

## 2021-01-06 PROCEDURE — 83615 LACTATE (LD) (LDH) ENZYME: CPT | Performed by: STUDENT IN AN ORGANIZED HEALTH CARE EDUCATION/TRAINING PROGRAM

## 2021-01-06 PROCEDURE — 85610 PROTHROMBIN TIME: CPT | Performed by: STUDENT IN AN ORGANIZED HEALTH CARE EDUCATION/TRAINING PROGRAM

## 2021-01-06 PROCEDURE — 214N000001 HC R&B CCU UMMC

## 2021-01-06 PROCEDURE — 97165 OT EVAL LOW COMPLEX 30 MIN: CPT | Mod: GO | Performed by: OCCUPATIONAL THERAPIST

## 2021-01-06 PROCEDURE — 83036 HEMOGLOBIN GLYCOSYLATED A1C: CPT | Performed by: STUDENT IN AN ORGANIZED HEALTH CARE EDUCATION/TRAINING PROGRAM

## 2021-01-06 PROCEDURE — 99233 SBSQ HOSP IP/OBS HIGH 50: CPT | Mod: GC | Performed by: INTERNAL MEDICINE

## 2021-01-06 RX ORDER — WARFARIN SODIUM 4 MG/1
4 TABLET ORAL
Status: COMPLETED | OUTPATIENT
Start: 2021-01-06 | End: 2021-01-06

## 2021-01-06 RX ADMIN — CEPHALEXIN 500 MG: 500 CAPSULE ORAL at 07:47

## 2021-01-06 RX ADMIN — INSULIN GLARGINE 8 UNITS: 100 INJECTION, SOLUTION SUBCUTANEOUS at 22:04

## 2021-01-06 RX ADMIN — OMEPRAZOLE 20 MG: 20 CAPSULE, DELAYED RELEASE ORAL at 07:47

## 2021-01-06 RX ADMIN — ASPIRIN 81 MG CHEWABLE TABLET 81 MG: 81 TABLET CHEWABLE at 07:47

## 2021-01-06 RX ADMIN — HYDRALAZINE HYDROCHLORIDE 100 MG: 100 TABLET, FILM COATED ORAL at 07:47

## 2021-01-06 RX ADMIN — LISINOPRIL 10 MG: 10 TABLET ORAL at 07:47

## 2021-01-06 RX ADMIN — ATORVASTATIN CALCIUM 20 MG: 20 TABLET, FILM COATED ORAL at 22:03

## 2021-01-06 RX ADMIN — AMIODARONE HYDROCHLORIDE 200 MG: 200 TABLET ORAL at 07:47

## 2021-01-06 RX ADMIN — ZOLPIDEM TARTRATE 5 MG: 5 TABLET, FILM COATED ORAL at 22:04

## 2021-01-06 RX ADMIN — ALLOPURINOL 200 MG: 100 TABLET ORAL at 07:47

## 2021-01-06 RX ADMIN — HYDRALAZINE HYDROCHLORIDE 100 MG: 100 TABLET, FILM COATED ORAL at 20:40

## 2021-01-06 RX ADMIN — FLUOXETINE 30 MG: 10 CAPSULE ORAL at 07:47

## 2021-01-06 RX ADMIN — HYDRALAZINE HYDROCHLORIDE 100 MG: 100 TABLET, FILM COATED ORAL at 14:15

## 2021-01-06 RX ADMIN — DOCUSATE SODIUM 50 MG AND SENNOSIDES 8.6 MG 1 TABLET: 8.6; 5 TABLET, FILM COATED ORAL at 07:47

## 2021-01-06 RX ADMIN — CEPHALEXIN 500 MG: 500 CAPSULE ORAL at 20:40

## 2021-01-06 RX ADMIN — MULTIPLE VITAMINS W/ MINERALS TAB 1 TABLET: TAB at 07:47

## 2021-01-06 RX ADMIN — WARFARIN SODIUM 4 MG: 4 TABLET ORAL at 18:27

## 2021-01-06 ASSESSMENT — ACTIVITIES OF DAILY LIVING (ADL)
ADLS_ACUITY_SCORE: 13
PREVIOUS_RESPONSIBILITIES: MEAL PREP;HOUSEKEEPING;LAUNDRY;SHOPPING;MEDICATION MANAGEMENT;FINANCES;DRIVING

## 2021-01-06 ASSESSMENT — MIFFLIN-ST. JEOR: SCORE: 1671.67

## 2021-01-06 NOTE — CONSULTS
Cardiology Consult                                                               2021  Eliseo Tanner MRN: [id number]  Age: 65 year old, : 1955        Reason for consult:      Episodes of lightheadedness, dizziness. Concern for arrhythmic etiology        Assessment and Recommendation:     Mr. Tanner is a 65 year old with PMH NICM with LVEF 15-20%, NYHA III s/p HM III 2020, s/p ICD placed on 3/16/2020, h/o MSSA driveline infection and bacteremia 2020 on prophylactic cephalexin, h/o VT on amiodarone, moderate nonobstructive CAD, severe MR, atrial fibrillation on warfarin, hypertension, ABHINAV requiring CPAP, CKD IV, DMII, HLP,  h/o ANA who presented with multiple episodes of dizziness and near syncopal symptoms.    Our impression is that it is unlikely that patient's constellation of symptoms are arrhythmic in etiology. Patient's device interrogation shows 4 events (, , , ) of paroxysmal tachyarrhythmia that is supraventricular in etiology. Likely atrial fibrillation over atrial flutter given irregularity of underlying atrial activity as captured on ventricular lead. TTE from  did not have pulse wave Doppler interrogation of mitral inflow to assess for presence of A-waves to help differentiate. No evidence of VT on device interrogation or on 12-lead ECGs given similarity of QRS complex morphology to prior ECGs.    Given the timing of patient's symptomatic episodes, it is possible that his reported episodes on  and  were from from afib, but ventricular rates for those episodes were  BPM, so doubt that would cause his symptoms or contribute to decreased LV filling, decreased PIs. No recorded tachyarrhythmias on  to explain his episode on that day.    Patient discussed with staff attending, Dr. Ko and the note reflects our joint plan. Thank you for consulting the cardiovascular services at the [facility]. Please do  not hesitate to call with questions or concerns.     Stanford Acuna MD    PGY-6, Cardiology Fellow  Pager: [phone]        History of Present Illness:     Mr. Tanner is a 65 year old with PMH NICM with LVEF 15-20%, NYHA III s/p HM III 2/18/2020, s/p ICD placed on 3/16/2020, h/o MSSA driveline infection and bacteremia 11/5/2020 on prophylactic cephalexin, h/o VT on amiodarone, moderate nonobstructive CAD, severe MR, atrial fibrillation on warfarin, hypertension, ABHINAV requiring CPAP, CKD IV, DMII, HLP,  h/o ANA who presented with multiple episodes of dizziness and near syncopal symptoms.    Patient reports that on Allie day he got dizzy when standing in his room.  This was also associated with some vision changes as well as speed drops down to 5300 on his LVAD and PI drops to 1.7.  These episodes will last about 10 minutes.  Due to 1 of these episodes he did have loss of balance and hit his head, but he did not lose any consciousness or have syncope.  Patient also reports that he had 2 other episodes with similar presentation on January 1 in January 2.      Past Medical History:     Patient Active Problem List   Diagnosis     Acute on chronic systolic congestive heart failure (H)     Anxiety     CKD (chronic kidney disease) stage 3, GFR 30-59 ml/min     Coronary artery disease involving native coronary artery of native heart without angina pectoris     GERD (gastroesophageal reflux disease)     Gout     Hyperlipidemia with target LDL less than 100     Nonischemic cardiomyopathy (H)     Non-nephrotic range proteinuria     ABHINAV on CPAP     Type 2 diabetes mellitus with stage 3 chronic kidney disease, without long-term current use of insulin (H)     Heart failure (H)     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)     Cardiac arrest (H)     LVAD (left ventricular assist device) present (H)     Chronic systolic congestive heart failure (H)     CKD (chronic kidney disease) stage 4, GFR 15-29 ml/min (H)      Bacteremia     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)     Paroxysmal atrial fibrillation (H)     Wound infection     ACC/AHA stage D heart failure (H)     Stage 3b chronic kidney disease     Mixed hyperlipidemia     Benign essential hypertension     Dizziness     Syncope         Past Surgical History:      Past Surgical History:   Procedure Laterality Date     ANESTHESIA CARDIOVERSION N/A 2/28/2020    Procedure: ANESTHESIA, FOR CARDIOVERSION;  Surgeon: GENERIC ANESTHESIA PROVIDER;  Location: [facility] OR     CV CENTRAL VENOUS CATHETER PLACEMENT N/A 2/13/2020    Procedure: Central Venous Catheter Placement;  Surgeon: Chente Moss MD;  Location:  HEART CARDIAC CATH LAB     CV INTRA-AORTIC BALLOON PUMP INSERTION N/A 2/7/2020    Procedure: Intra-Aortic Balloon Pump Insertion;  Surgeon: Jose Baldwin MD;  Location:  HEART CARDIAC CATH LAB     CV INTRA-AORTIC BALLOON PUMP INSERTION N/A 2/13/2020    Procedure: Intra-Aortic Balloon Pump Insertion;  Surgeon: Chente Moss MD;  Location:  HEART CARDIAC CATH LAB     CV RIGHT HEART CATH N/A 9/21/2020    Procedure: CV RIGHT HEART CATH;  Surgeon: Dalton Baeza MD;  Location:  HEART CARDIAC CATH LAB     CV SWAN LUCIANA PROCEDURE N/A 2/13/2020    Procedure: [place] Luc[place] Procedure;  Surgeon: Chente Moss MD;  Location:  HEART CARDIAC CATH LAB     EP ICD Bilateral 3/16/2020    Procedure: EP ICD;  Surgeon: Dali Day MD;  Location:  HEART CARDIAC CATH LAB     INSERT VENTRICULAR ASSIST DEVICE LEFT (HEARTMATE II) N/A 2/18/2020    Procedure: INSERTION, LEFT VENTRICULAR ASSIST DEVICE (HEARTMATE III);  Surgeon: Mac Jaramillo MD;  Location:  OR     THORACOSCOPY Right 3/6/2020    Procedure: RIGHT VIDEO-ASSISTED THORASCOPIC SURGERY, EVACUATION OF HEMOTHORAX, PLACEMENT OF CHEST TUBES;  Surgeon: William Gan MD;  Location:  OR         Social History:     Social History      Socioeconomic History     Marital status:      Spouse name: Not on file     Number of children: Not on file     Years of education: Not on file     Highest education level: Not on file   Occupational History     Not on file   Social Needs     Financial resource strain: Not on file     Food insecurity     Worry: Not on file     Inability: Not on file     Transportation needs     Medical: Not on file     Non-medical: Not on file   Tobacco Use     Smoking status: Former Smoker     Quit date: 1994     Years since quittin.7     Smokeless tobacco: Never Used   Substance and Sexual Activity     Alcohol use: Not on file     Drug use: Not on file     Sexual activity: Not on file   Lifestyle     Physical activity     Days per week: Not on file     Minutes per session: Not on file     Stress: Not on file   Relationships     Social connections     Talks on phone: Not on file     Gets together: Not on file     Attends [employer] service: Not on file     Active member of club or organization: Not on file     Attends meetings of clubs or organizations: Not on file     Relationship status: Not on file     Intimate partner violence     Fear of current or ex partner: Not on file     Emotionally abused: Not on file     Physically abused: Not on file     Forced sexual activity: Not on file   Other Topics Concern     Not on file   Social History Narrative     Not on file         Family History:     No family history on file.      Allergies:     Allergies   Allergen Reactions     Heparin      HIT screen positive 20, reflex DAVINA negative; however heme recommended treating as if positive  HIT screen negative 20     Oxycodone Itching and Other (See Comments)     Chlorhexidine Rash         Medications:     No current facility-administered medications on file prior to encounter.        acetaminophen (TYLENOL) 325 MG tablet, Take 2 tablets (650 mg) by mouth every 4 hours as needed for mild pain or fever        allopurinol (ZYLOPRIM) 100 MG tablet, 2 tablets (200 mg) by Oral or Feeding Tube route daily       amiodarone (PACERONE) 200 MG tablet, Take 1 tablet (200 mg) by mouth daily       aspirin (ASA) 81 MG EC tablet, Take 1 tablet (81 mg) by mouth daily       atorvastatin (LIPITOR) 20 MG tablet, Take 20 mg by mouth daily       bumetanide (BUMEX) 1 MG tablet, Take 2mg in the morning and 1mg in the afternoon       cephALEXin (KEFLEX) 500 MG capsule, Take 1 capsule (500 mg) by mouth 2 times daily       co-enzyme Q-10 200 MG CAPS, 200 mg by Oral or Feeding Tube route daily       finasteride (PROSCAR) 5 MG tablet, Take 1 tablet (5 mg) by mouth daily Helps with urinary retention.       FLUoxetine (PROZAC) 10 MG capsule, Take 30 mg by mouth daily       hydrALAZINE (APRESOLINE) 50 MG tablet, Take 2 tablets (100 mg) by mouth 3 times daily       insulin glargine (BASAGLAR KWIKPEN) 100 UNIT/ML pen, Inject 10 Units Subcutaneous At Bedtime        lisinopril (ZESTRIL) 10 MG tablet, Take 1 tablet (10 mg) by mouth daily       magnesium oxide (MAG-OX) 400 MG tablet, 1 tablet (400 mg) by Oral or Feeding Tube route daily       multivitamin w/minerals (THERA-VIT-M) tablet, Take 1 tablet by mouth daily       nitroGLYcerin (NITROSTAT) 0.4 MG sublingual tablet, For chest pain place 1 tablet under the tongue every 5 minutes for 3 doses. If symptoms persist 5 minutes after 1st dose call 911.       omeprazole (PRILOSEC) 20 MG DR capsule, Take 20 mg by mouth daily       potassium chloride ER (KLOR-CON M) 20 MEQ CR tablet, Take 20 mEq by mouth daily       senna-docusate (SENOKOT-S/PERICOLACE) 8.6-50 MG tablet, Take 1 tablet by mouth 2 times daily as needed for constipation       warfarin ANTICOAGULANT (COUMADIN) 2 MG tablet, Take 4 mg by mouth daily        zolpidem (AMBIEN) 5 MG tablet, Take 5 mg by mouth nightly as needed for Insomnia       insulin pen needle (BD JAIME U/F) 32G X 4 MM miscellaneous,        tamsulosin (FLOMAX) 0.4 MG capsule, Take 1  "capsule (0.4 mg) by mouth daily This med helps with urinary retention            Physical Exam:     BP (!) 80/54   Pulse 88   Temp 97.6  F (36.4  C) (Oral)   Resp 16   Ht 1.702 m (5' 7\")   Wt 93.4 kg (206 lb)   SpO2 93%   BMI 32.26 kg/m        Wt Readings from Last 4 Encounters:   01/05/21 93.4 kg (206 lb)   12/17/20 96.2 kg (212 lb)   11/18/20 95.3 kg (210 lb 1.6 oz)   11/12/20 98.9 kg (218 lb)         Intake/Output Summary (Last 24 hours) at 1/6/2021 0812  Last data filed at 1/6/2021 0100  Gross per 24 hour   Intake --   Output 1800 ml   Net -1800 ml       Gen: AA&Ox3, no acute distress  HEENT:AT/ NC, PERRL b/l, EOM grossly intact, mucous membranes pink, moist without plaque or exudate, no cervical adenopathy  PULM/THORAX: Clear to auscultation bilaterally, no rales/rhonchi/wheezes  CV: tachycardic, irregularly irregular, S1 and S2 appreciated, LVAD hum with intermittent speed drops  ABD:soft, nontender, nondistended. Normoactive bowel sounds x 4, no HSM appreciated.  EXT: No edema, clubbing or cyanosis. No asymmetrical edema or tenderness to palpation in calves bilaterally.  NEURO: CN II-XII intact, strength 5/5 throughout      Data:     Labs Reviewed on Admission    Troponin   Lab Results   Component Value Date    TROPI 0.103 (H) 01/05/2021    TROPI 0.089 (H) 02/13/2020    TROPI 0.155 (HH) 02/07/2020    TROPI 0.145 (HH) 02/06/2020     BMP  Recent Labs   Lab 01/06/21  0539 01/05/21  1419    130*   POTASSIUM 4.3 4.4   CHLORIDE 102 96   CALOS 8.7 8.8   CO2 23 24   * 100*   CR 2.85* 2.64*   * 198*     CBC  Recent Labs   Lab 01/05/21  1419   WBC 7.8   RBC 5.07   HGB 10.8*   HCT 36.7*   MCV 72*   MCH 21.3*   MCHC 29.4*   RDW 24.6*        INR  Recent Labs   Lab 01/06/21  0539 01/05/21  1419   INR 2.53* 2.16*      Hepatic Panel   Lab Results   Component Value Date    AST 40 12/17/2020     Lab Results   Component Value Date    ALT 12 12/17/2020     No results found for: BILICONJ   Lab " Results   Component Value Date    BILITOTAL 0.5 12/17/2020     Lab Results   Component Value Date    ALBUMIN 3.4 12/17/2020     Lab Results   Component Value Date    PROTTOTAL 8.7 12/17/2020      Lab Results   Component Value Date    ALKPHOS 173 12/17/2020           Most Recent Imaging:           Echo:   Interpretation Summary (1/5/20)  Heartmate III LVAD, speed 5500 RPM     Left ventricular size is normal. Severely (EF 10-15%) reduced left ventricular  function is present.     LVAD cannula is not well seen in the LV apex. The outflow graft is well  visualized and Doppler is normal .     Mild to moderate right ventricular dilation is present. Global right  ventricular function is mildly to moderately reduced.     Aortic valve opens with every heart beat.     The inferior vena cava was normal in size with preserved respiratory  variability.     No pericardial effusion is present.

## 2021-01-06 NOTE — PHARMACY-ANTICOAGULATION SERVICE
Clinical Pharmacy - Warfarin Dosing Consult     Pharmacy has been consulted to manage this patient s warfarin therapy.  Indication: Atrial Fibrillation  Therapy Goal: INR 2-3  Warfarin Prior to Admission: Yes  Warfarin PTA Regimen: 4 mg daily    INR   Date Value Ref Range Status   01/05/2021 2.16 (H) 0.86 - 1.14 Final   12/29/2020 2.2 (A) 0.90 - 1.10 Final     Comment:     Outside Lab      Chromogenic Factor 10   Date Value Ref Range Status   03/16/2020 27 (L) 70 - 130 % Final     Comment:     Therapeutic Range:  A Chromogenic Factor 10 level of approximately 20-40%   inversely correlates with an INR of 2-3 for patients receiving Warfarin.   Chromogenic Factor 10 levels below 20% indicate an INR greater than 3 and   levels above 40% indicate an INR less than 2.       Recommend warfarin 4 mg today.      Pharmacy will monitor Eliseo Tanner daily and order warfarin doses to achieve specified goal.      Please contact pharmacy as soon as possible if the warfarin needs to be held for a procedure or if the warfarin goals change.      Ana Escobedo, PharmD, BCPS

## 2021-01-06 NOTE — PROGRESS NOTES
D: Called patient for routine virtual VAD Coordinator rounding (r/t COVID-19). No VAD related questions or concerns at this time.  Pt is still in the ER awaiting a bed on the floor.  I: Discussed POC and provided support and listened to patient and care giver's thoughts and concerns.  P: Continue to follow patient and address any questions or concerns patient and or caregiver may have.

## 2021-01-06 NOTE — PHARMACY-ADMISSION MEDICATION HISTORY
Admission Medication History Completed by Pharmacy    See [facility] Admission Navigator for allergy information, preferred outpatient pharmacy, prior to admission medications and immunization status.     Medication History Sources:     The patient    Outside pharmacy fill records    Care Everywhere    Changes made to PTA medication list (reason):    Added: None    Deleted:   o Albuterol inhaler- patient reports he doesn't need or have this medication anymore  o Bisacodyl 10mg suppository - patient states he doesn't utilize this medication  o Miralax- patient states he doesn't utilize this medication    Changed:   o Fluoxetine from 20mg daily to 30mg daily  o Basaglar from 12 units at bedtime to 10 units at bedtime  o Senokot-S from scheduled to as needed  o Warfarin from 3mg daily to 4mg daily    Additional Information:    The patient was an extremely reliable historian who knew his medications very well.     Anticoagulation  Medication: Warfarin  Indication: LVAD  INR Goal: 2-3  Current dosing regimen: 4mg every day of the week  Last dose (4 mg) was taken 01/04 at bedtime.    Antimicrobial History  Medication Name: Cephalexin 500mg   Indication: LVAD immunosuppression prophylaxis  Instructions: take 500mg twice daily  Duration: indefinitely     Medication Name: Cefazolin IV injection  Indication: Bacteremia  Instructions: Inject 2g every 8 hours  Duration: 11/15-12/27  Patient completed the course of antibiotics.  Complications: No    Medication Name: Bactrim 400-80mg  Indication: driveline site infection  Instructions: Take 1 tablet twice daily   Duration: 14 days (11/09-11/23)  Patient completed the course of antibiotics  Complications: No     Medication Name: Doxycyline 100mg   Indication: Driveline site infection  Instructions: Take 100mg twice daily  Duration: 14 days- but only took from 11/05-11/08  Patient did not complete the course of antibiotics due to resistance and was switched to Bactrim.       Prior to  Admission medications    Medication Sig Last Dose Taking? Auth Provider   acetaminophen (TYLENOL) 325 MG tablet Take 2 tablets (650 mg) by mouth every 4 hours as needed for mild pain or fever 1/5/2021 at AM Yes Mono Gambino PA-C   allopurinol (ZYLOPRIM) 100 MG tablet 2 tablets (200 mg) by Oral or Feeding Tube route daily 1/5/2021 at AM Yes Mono Gambino PA-C   amiodarone (PACERONE) 200 MG tablet Take 1 tablet (200 mg) by mouth daily 1/5/2021 at AM Yes Mervat Decker PA-C   aspirin (ASA) 81 MG EC tablet Take 1 tablet (81 mg) by mouth daily 1/5/2021 at AM Yes Nader Cisneros MD   atorvastatin (LIPITOR) 20 MG tablet Take 20 mg by mouth daily 1/4/2021 at PM Yes Unknown, Entered By History   bumetanide (BUMEX) 1 MG tablet Take 2mg in the morning and 1mg in the afternoon 1/5/2021 at 1400 Yes Nader Cisneros MD   cephALEXin (KEFLEX) 500 MG capsule Take 1 capsule (500 mg) by mouth 2 times daily 1/5/2021 at AM Yes Negar Bhatti DO   co-enzyme Q-10 200 MG CAPS 200 mg by Oral or Feeding Tube route daily 1/4/2021 at PM Yes Mono Gambino PA-C   finasteride (PROSCAR) 5 MG tablet Take 1 tablet (5 mg) by mouth daily Helps with urinary retention. 1/5/2021 at AM Yes Jess Meraz MD   FLUoxetine (PROZAC) 10 MG capsule Take 30 mg by mouth daily 1/5/2021 at AM Yes Unknown, Entered By History   hydrALAZINE (APRESOLINE) 50 MG tablet Take 2 tablets (100 mg) by mouth 3 times daily 1/5/2021 at 1400 Yes Nader Cisneros MD   insulin glargine (BASAGLAR KWIKPEN) 100 UNIT/ML pen Inject 10 Units Subcutaneous At Bedtime  1/4/2021 at PM Yes Unknown, Entered By History   lisinopril (ZESTRIL) 10 MG tablet Take 1 tablet (10 mg) by mouth daily 1/5/2021 at AM Yes Mervat Decker PA-C   magnesium oxide (MAG-OX) 400 MG tablet 1 tablet (400 mg) by Oral or Feeding Tube route daily 1/5/2021 at AM Yes Mono Gambino PA-C   multivitamin w/minerals (THERA-VIT-M) tablet Take 1 tablet by mouth daily 1/5/2021 at Unknown time Yes  Mono Gambino PA-C   nitroGLYcerin (NITROSTAT) 0.4 MG sublingual tablet For chest pain place 1 tablet under the tongue every 5 minutes for 3 doses. If symptoms persist 5 minutes after 1st dose call 911. Has not ever taken Yes Jess Meraz MD   omeprazole (PRILOSEC) 20 MG DR capsule Take 20 mg by mouth daily 1/5/2021 at AM Yes Unknown, Entered By History   potassium chloride ER (KLOR-CON M) 20 MEQ CR tablet Take 20 mEq by mouth daily 1/5/2021 at AM Yes Unknown, Entered By History   senna-docusate (SENOKOT-S/PERICOLACE) 8.6-50 MG tablet Take 1 tablet by mouth 2 times daily as needed for constipation Past Month at Unknown time Yes Unknown, Entered By History   warfarin ANTICOAGULANT (COUMADIN) 2 MG tablet Take 4 mg by mouth daily  1/4/2021 at PM Yes Unknown, Entered By History   zolpidem (AMBIEN) 5 MG tablet Take 5 mg by mouth nightly as needed for Insomnia 1/4/2021 at PM Yes Reported, Patient   insulin pen needle (BD JAIME U/F) 32G X 4 MM miscellaneous    Reported, Patient   tamsulosin (FLOMAX) 0.4 MG capsule Take 1 capsule (0.4 mg) by mouth daily This med helps with urinary retention   Jess Meraz MD       Date completed: 01/05/21    Medication history completed by: CHEPE Perez

## 2021-01-06 NOTE — PROGRESS NOTES
[facility] Cardiology Progress Note           Assessment and Plan:   Eliseo Tanner is a 65 year old with PMH NICM with LVEF 15-20%, NYHA III s/p HM III 2/18/2020(post op complicated by retrosternal hematoma with bleeding in the lungs), s/p ICD placed on 3/16/2020, h/o MSSA driveline infection and bacteremia 11/5/2020 on prophylactic cephalexin, h/o VT on amiodarone, moderate nonobstructive CAD, severe MR, atrial fibrillation on warfarin, hypertension, ABHINAV requiring CPAP, CKD IV, DMII, HLP,  h/o ANA, came for evaluation of multiple episodes of dizziness and near syncopal symptoms, concerning for arrhythmia with decompensated heart failure.    Changes today 1/6/21  - appreciate input from EP, unlikely that the patient's symptoms are arrhythmic in etiology.   - hold Bumex given worsening kidney function without signs of volume overload this AM  - [facility] scheduled for further evaluation    Multiple dizziness and near syncope episodes  Arrhythmia, unspecified  H/o Afib on warfarin, VT on amiodarone  Every episode was associated with palpitation and changes in LVAD parameteres. Interrogation of the device was done 1/4, however, still not clear which rhythm was he having given single lead ICD. EKG obtained during this visit showed regular, wide QRS without P waves concerning for possible slow VT vs Afib(hard to interprete given artifacts); changes noticed compared to prior EKG in March which showed atrial fibrillation. Denies any chest pain, orthopnea, or PND. Denies any contraction episodes or loss of consciousness episode. Neurological exams are intact. Hit his head once without traumatic lesions on exam, CT head w/o showed no acute intracranial pathology.    EP consult placed, appreciate their input. Unlikely that his symptoms are arrhythmic in etiology. Device interrogation shows paroxysmal supraventricular arrhythmia; likely atrial fibrillation. No evidence of VT.  His ventricular rate at the time he had the  episodes were  bpm. No signs of hypervolemic today. Creatinine increase after diuresis 2 mg yesterday. Will hold diuretic for now. Waiting for RHC result.    - RHC scheduled  - Trend BMP, Mg; keep K >4, Mg >2   - monitor Telemetry  - continue Amiodarone  - continue Warfarin     Decompensated heart failure  NICM with LVEF 10-15% NYHA III s/p HM III 02/20 & single lead ICD 03/20  Has been feeling fatigue. Denies any SOB, orthopnea, or PND. Weight has been in between 203-206 lbs. However, he is mildly tachycardic on exam with CVP~13 without peripheral edema. Labs showed some worsening kidney function compared to 7 days ago. Concerning for decompensation heart failure associated with arrhythmia.    - Echo ordered, result noted below  - hold pta Bumex, start Bumex 2 mg iv q12h  - replace potassium prn given worsening kidney function, will consider schedule potassium tomorrow   - Heart failure management              ACEI/ARB: continue lisinopril 10 mg daily              Afterload reduction: hydralazine 100 mg TID              Diuretic: hold pta Bumex, hold bumex IV               No BB/Spironolactone given kidney function              LVAD: continue ASA, atorvastatin               Hold pta potassium supplement     #Increase creatinine elevation, worsening  Possibly BERNARDA on top CKD stage IV. Hasn't improved after diuretics.  - Waiting for RHC  - trend BMP  - replace lytes as needed     #H/o MSSA bacteremia with driveline infection  Previously admit in 11/2020. TTE & CHAMP without vegetation seen. Finished 6 wk course of IV abx on 12/27/20 and later switched to Cephalexin 500 mg daily on 12/28 until now. Denies fever/chills. No other localizing signs of infection.  - continue cephalexin 500 mg daily  - continue to monitor signs of infection  - CIS     Chronic conditions:  DM: continue glargine 8 unit at bedtime, Medium dose sliding scale, POCT glucose TID AC,HS  Hypertension: lisinopril, hydralazine  HLP: continue  atorvastatin  ABHINAV: continue CPAP at night        Interval History:   Noticed some hiccups overnight along with changes in his LVAD parameters: variable pulse index(decrease with increase flow, increase with decrease in flow). Denies any lightheadedness or dizziness during the episodes. Denies any chest pain, SOB, N/V.           Review of Systems:   The Review of Systems is negative other than noted in the HPI          Physical Exam (Resident / Clinician):   Vitals were reviewed  Temp: 97.6  F (36.4  C) Temp src: Oral BP: (!) 80/54 Pulse: 88   Resp: 16 SpO2: 93 % O2 Device: None (Room air)    GA: NAD  HEENT: CVP ~8  Lungs: clear on auscultation both lungs  CVS: Irregular, normal S1S2, LVAD hums, no peripheral edema  Abd: LVAD driveline looks intact, no signs of infection, normal bowel sounds, soft, nontender  Ext: no joint swelling or signs of infection  Neuro: A&O, grossly intact          Data:   CBC RESULTS:   Recent Labs   Lab Test 01/05/21  1419   WBC 7.8   RBC 5.07   HGB 10.8*   HCT 36.7*   MCV 72*   MCH 21.3*   MCHC 29.4*   RDW 24.6*        Last Comprehensive Metabolic Panel:  Sodium   Date Value Ref Range Status   01/06/2021 135 133 - 144 mmol/L Final     Potassium   Date Value Ref Range Status   01/06/2021 4.3 3.4 - 5.3 mmol/L Final     Chloride   Date Value Ref Range Status   01/06/2021 102 94 - 109 mmol/L Final     Carbon Dioxide   Date Value Ref Range Status   01/06/2021 23 20 - 32 mmol/L Final     Anion Gap   Date Value Ref Range Status   01/06/2021 10 3 - 14 mmol/L Final     Glucose   Date Value Ref Range Status   01/06/2021 145 (H) 70 - 99 mg/dL Final     Urea Nitrogen   Date Value Ref Range Status   01/06/2021 105 (H) 7 - 30 mg/dL Final     Creatinine   Date Value Ref Range Status   01/06/2021 2.85 (H) 0.66 - 1.25 mg/dL Final     GFR Estimate   Date Value Ref Range Status   01/06/2021 22 (L) >60 mL/min/[1.73_m2] Final     Comment:     Non  GFR Calc  Starting 12/18/2018, serum  creatinine based estimated GFR (eGFR) will be   calculated using the Chronic Kidney Disease Epidemiology Collaboration   (CKD-EPI) equation.       Calcium   Date Value Ref Range Status   01/06/2021 8.7 8.5 - 10.1 mg/dL Final     Echo LVAD 1/5/20  -Heartmate III LVAD, speed 5500 RPM   -Left ventricular size is normal. Severely (EF 10-15%) reduced left ventricular  function is present.  -LVAD cannula is not well seen in the LV apex. The outflow graft is well  visualized and Doppler is normal .  -Mild to moderate right ventricular dilation is present. Global right  ventricular function is mildly to moderately reduced.  -Aortic valve opens with every heart beat.  -The inferior vena cava was normal in size with preserved respiratory  variability.  -No pericardial effusion is present.     Salena Johns MD   Internal Medicine, PGY-1

## 2021-01-06 NOTE — PROGRESS NOTES
01/06/21 1100   Quick Adds   Type of Visit Initial Occupational Therapy Evaluation   Living Environment   People in home alone   Self-Care   Usual Activity Tolerance moderate   Current Activity Tolerance poor   Equipment Currently Used at Home cane, straight   Disability/Function   Hearing Difficulty or Deaf no   Wear Glasses or Blind yes   Vision Management glasses   Concentrating, Remembering or Making Decisions Difficulty no   Difficulty Communicating no   Difficulty Eating/Swallowing no   Walking or Climbing Stairs Difficulty no   Dressing/Bathing Difficulty no   Toileting issues no   Doing Errands Independently Difficulty (such as shopping) no   Fall history within last six months no   Change in Functional Status Since Onset of Current Illness/Injury yes   General Information   Referring Physician Salena Johns   Patient/Family Therapy Goal Statement (OT) return to home to PLOF   Additional Occupational Profile Info/Pertinent History of Current Problem Eliseo Tanner is a 65 year old with PMH NICM with LVEF 15-20%, NYHA III s/p HM III 2/18/2020(post op complicated by retrosternal hematoma with bleeding in the lungs), s/p ICD placed on 3/16/2020, h/o MSSA driveline infection and bacteremia 11/5/2020 on prophylactic cephalexin, h/o VT on amiodarone, moderate nonobstructive CAD, severe MR, atrial fibrillation on warfarin, hypertension, ABHINAV requiring CPAP, CKD IV, DMII, HLP,  h/o ANA, came for evaluation of multiple episodes of dizziness and near syncopal symptoms, concerning for arrhythmia with decompensated heart failure.   Existing Precautions/Restrictions fall   General Observations and Info pt motivated, presyncopal with standing.    Cognitive Status Examination   Orientation Status orientation to person, place and time   Affect/Mental Status (Cognitive) WFL   Follows Commands follows two-step commands   Memory Deficit minimal deficit   Cognitive Status Comments pt having difficulty with  organizing thoughts at times, further testing required.    Visual Perception   Visual Impairment/Limitations WFL   Sensory   Sensory Quick Adds No deficits were identified   Range of Motion Comprehensive   General Range of Motion no range of motion deficits identified   Strength Comprehensive (MMT)   General Manual Muscle Testing (MMT) Assessment other (see comments)   Comment, General Manual Muscle Testing (MMT) Assessment Pt grossly deconditioned howveer symmptrical.    Coordination   Coordination Comments no concerns.    Bed Mobility   Bed Mobility supine-sit;sit-supine   Supine-Sit [place] (Bed Mobility) contact guard   Sit-Supine [place] (Bed Mobility) minimum assist (75% patient effort)   Balance   Balance Assessment standing balance: dynamic   Standing Balance: Dynamic poor balance  (presyncopal)   Instrumental Activities of Daily Living (IADL)   Previous Responsibilities meal prep;housekeeping;laundry;shopping;medication management;finances;driving   Clinical Impression   Criteria for Skilled Therapeutic Interventions Met (OT) yes   OT Diagnosis decreased ADL I   Assessment of Occupational Performance 5 or more Performance Deficits   Identified Performance Deficits dressing, bathing, toileting, G/H, home making.    Planned Therapy Interventions (OT) ADL retraining;IADL retraining;strengthening;transfer training;home program guidelines;progressive activity/exercise;risk factor education   Clinical Decision Making Complexity (OT) low complexity   Therapy Frequency (OT) 5x/week   Predicted Duration of Therapy 2 weeks   Risks and Benefits of Treatment have been explained. Yes   Patient, Family & other staff in agreement with plan of care Yes   Comment-Clinical Impression Pt presents to OT with decreased activity tolerance and at risk of further deconditioning leading to further decrease in ADL I.    OT Discharge Planning    OT Discharge Recommendation (DC Rec) Home with assist;Transitional Care  Facility   OT Rationale for DC Rec pt currently unable to tolerate functional mobility without presyncopal symptoms.    OT Brief overview of current status  PT standing today and presyncopal within approx 20 seconds. Pt unable to tolerate standing.    Total Evaluation Time (Minutes)   Total Evaluation Time (Minutes) 3

## 2021-01-07 LAB
ANION GAP SERPL CALCULATED.3IONS-SCNC: 10 MMOL/L (ref 3–14)
BUN SERPL-MCNC: 96 MG/DL (ref 7–30)
CALCIUM SERPL-MCNC: 8.7 MG/DL (ref 8.5–10.1)
CHLORIDE SERPL-SCNC: 103 MMOL/L (ref 94–109)
CO2 SERPL-SCNC: 22 MMOL/L (ref 20–32)
CREAT SERPL-MCNC: 2.54 MG/DL (ref 0.66–1.25)
GFR SERPL CREATININE-BSD FRML MDRD: 25 ML/MIN/{1.73_M2}
GLUCOSE BLDC GLUCOMTR-MCNC: 123 MG/DL (ref 70–99)
GLUCOSE BLDC GLUCOMTR-MCNC: 129 MG/DL (ref 70–99)
GLUCOSE BLDC GLUCOMTR-MCNC: 132 MG/DL (ref 70–99)
GLUCOSE BLDC GLUCOMTR-MCNC: 149 MG/DL (ref 70–99)
GLUCOSE SERPL-MCNC: 120 MG/DL (ref 70–99)
INR PPP: 2.4 (ref 0.86–1.14)
INTERPRETATION ECG - MUSE: NORMAL
MAGNESIUM SERPL-MCNC: 2.5 MG/DL (ref 1.6–2.3)
POTASSIUM SERPL-SCNC: 4.4 MMOL/L (ref 3.4–5.3)
SODIUM SERPL-SCNC: 135 MMOL/L (ref 133–144)

## 2021-01-07 PROCEDURE — 93451 RIGHT HEART CATH: CPT | Performed by: INTERNAL MEDICINE

## 2021-01-07 PROCEDURE — 272N000001 HC OR GENERAL SUPPLY STERILE: Performed by: INTERNAL MEDICINE

## 2021-01-07 PROCEDURE — 999N001017 HC STATISTIC GLUCOSE BY METER IP

## 2021-01-07 PROCEDURE — 85610 PROTHROMBIN TIME: CPT | Performed by: STUDENT IN AN ORGANIZED HEALTH CARE EDUCATION/TRAINING PROGRAM

## 2021-01-07 PROCEDURE — 258N000003 HC RX IP 258 OP 636: Performed by: STUDENT IN AN ORGANIZED HEALTH CARE EDUCATION/TRAINING PROGRAM

## 2021-01-07 PROCEDURE — 250N000013 HC RX MED GY IP 250 OP 250 PS 637: Performed by: STUDENT IN AN ORGANIZED HEALTH CARE EDUCATION/TRAINING PROGRAM

## 2021-01-07 PROCEDURE — 250N000012 HC RX MED GY IP 250 OP 636 PS 637: Performed by: STUDENT IN AN ORGANIZED HEALTH CARE EDUCATION/TRAINING PROGRAM

## 2021-01-07 PROCEDURE — 36415 COLL VENOUS BLD VENIPUNCTURE: CPT | Performed by: STUDENT IN AN ORGANIZED HEALTH CARE EDUCATION/TRAINING PROGRAM

## 2021-01-07 PROCEDURE — 83735 ASSAY OF MAGNESIUM: CPT | Performed by: STUDENT IN AN ORGANIZED HEALTH CARE EDUCATION/TRAINING PROGRAM

## 2021-01-07 PROCEDURE — 99232 SBSQ HOSP IP/OBS MODERATE 35: CPT | Mod: 25 | Performed by: INTERNAL MEDICINE

## 2021-01-07 PROCEDURE — 4A023N6 MEASUREMENT OF CARDIAC SAMPLING AND PRESSURE, RIGHT HEART, PERCUTANEOUS APPROACH: ICD-10-PCS | Performed by: INTERNAL MEDICINE

## 2021-01-07 PROCEDURE — 80048 BASIC METABOLIC PNL TOTAL CA: CPT | Performed by: STUDENT IN AN ORGANIZED HEALTH CARE EDUCATION/TRAINING PROGRAM

## 2021-01-07 PROCEDURE — 214N000001 HC R&B CCU UMMC

## 2021-01-07 PROCEDURE — 250N000009 HC RX 250: Performed by: INTERNAL MEDICINE

## 2021-01-07 PROCEDURE — 93451 RIGHT HEART CATH: CPT | Mod: 26 | Performed by: INTERNAL MEDICINE

## 2021-01-07 PROCEDURE — 250N000013 HC RX MED GY IP 250 OP 250 PS 637: Performed by: INTERNAL MEDICINE

## 2021-01-07 RX ORDER — WARFARIN SODIUM 4 MG/1
4 TABLET ORAL
Status: COMPLETED | OUTPATIENT
Start: 2021-01-07 | End: 2021-01-07

## 2021-01-07 RX ADMIN — HYDRALAZINE HYDROCHLORIDE 100 MG: 100 TABLET, FILM COATED ORAL at 08:50

## 2021-01-07 RX ADMIN — SODIUM CHLORIDE 500 ML: 9 INJECTION, SOLUTION INTRAVENOUS at 18:47

## 2021-01-07 RX ADMIN — OMEPRAZOLE 20 MG: 20 CAPSULE, DELAYED RELEASE ORAL at 08:50

## 2021-01-07 RX ADMIN — MULTIPLE VITAMINS W/ MINERALS TAB 1 TABLET: TAB at 08:50

## 2021-01-07 RX ADMIN — CEPHALEXIN 500 MG: 500 CAPSULE ORAL at 19:47

## 2021-01-07 RX ADMIN — ZOLPIDEM TARTRATE 5 MG: 5 TABLET, FILM COATED ORAL at 22:00

## 2021-01-07 RX ADMIN — INSULIN GLARGINE 8 UNITS: 100 INJECTION, SOLUTION SUBCUTANEOUS at 22:01

## 2021-01-07 RX ADMIN — ATORVASTATIN CALCIUM 20 MG: 20 TABLET, FILM COATED ORAL at 22:00

## 2021-01-07 RX ADMIN — SODIUM CHLORIDE 500 ML: 9 INJECTION, SOLUTION INTRAVENOUS at 14:40

## 2021-01-07 RX ADMIN — INSULIN ASPART 1 UNITS: 100 INJECTION, SOLUTION INTRAVENOUS; SUBCUTANEOUS at 19:01

## 2021-01-07 RX ADMIN — CEPHALEXIN 500 MG: 500 CAPSULE ORAL at 08:50

## 2021-01-07 RX ADMIN — ALLOPURINOL 200 MG: 100 TABLET ORAL at 08:50

## 2021-01-07 RX ADMIN — LISINOPRIL 10 MG: 10 TABLET ORAL at 08:50

## 2021-01-07 RX ADMIN — HYDRALAZINE HYDROCHLORIDE 100 MG: 100 TABLET, FILM COATED ORAL at 19:47

## 2021-01-07 RX ADMIN — ASPIRIN 81 MG CHEWABLE TABLET 81 MG: 81 TABLET CHEWABLE at 08:50

## 2021-01-07 RX ADMIN — WARFARIN SODIUM 4 MG: 4 TABLET ORAL at 17:32

## 2021-01-07 RX ADMIN — FLUOXETINE 30 MG: 10 CAPSULE ORAL at 08:50

## 2021-01-07 RX ADMIN — AMIODARONE HYDROCHLORIDE 200 MG: 200 TABLET ORAL at 08:50

## 2021-01-07 RX ADMIN — HYDRALAZINE HYDROCHLORIDE 100 MG: 100 TABLET, FILM COATED ORAL at 13:49

## 2021-01-07 ASSESSMENT — ACTIVITIES OF DAILY LIVING (ADL)
ADLS_ACUITY_SCORE: 13

## 2021-01-07 ASSESSMENT — MIFFLIN-ST. JEOR: SCORE: 1673.63

## 2021-01-07 NOTE — PLAN OF CARE
Temp: 99  F (37.2  C) Temp src: Oral BP: 92/70 Pulse: 82   Resp: 18 SpO2: 100 % O2 Device: None (Room air)      D: Dizziness, near syncopal episode with suspect arrhythmic etiology. Hx NICM EF 15-20%, LVAD HM3 2/18/20, ICD 3/16/20, DL infection, bacteremia.    I/A: Monitored vitals and assessed pt status. A&O x4. VSS see flowsheet. Paced. RA. LVAD PIs 1.5-8 improved with repositioning, all other numbers WNL, no alarms. Dressing change done. 2 RN skin assessment completed with April Swift RN. Voiding adequate amount in bedside urinal. Up SBA. Sleeping between cares.    P: Possible RHC today. Continue to monitor and follow POC. Notify Cards 2 with changes.

## 2021-01-07 NOTE — PROGRESS NOTES
"Admission          1/6/2021 19:00 PM  -----------------------------------------------------------  Diagnosis: VT/syncopal episodes    Admitted from: [facility] ED  Report given from: ED RN  Via: stretcher  Family Aware of Admission: Yes  Belongings: Placed in closet; valuables sent home with family. CPAP, phone, , hat, clothing, shoes, LVAD equipment (Back up bag, extra controller, 2 \"caps\", 4 batteries)  Admission Profile: in progress  Teaching: orientation to unit, call don't fall, use of console, meal times, visiting hours,  when to call for the RN (angina/sob/dizzyness, etc.), and enforced importance of safety   Access: PIV  Telemetry:Placed on pt  Ht./Wt.: complete    Temp:  [98.1  F (36.7  C)] 98.1  F (36.7  C)  Pulse:  [] 65  Resp:  [16-18] 18  BP: ()/(42-85) 92/70  SpO2:  [93 %-99 %] 97 %    "

## 2021-01-07 NOTE — DISCHARGE INSTRUCTIONS
Low Sodium Nutrition Therapy   (Nutrition Care Manual)  Eating less sodium can help you if you have high blood pressure, heart failure, or kidney or liver disease.     Your body needs a little sodium, but too much sodium can cause your body to hold onto extra water. This extra water will raise your blood pressure and can cause damage to your heart, kidneys, or liver as they are forced to work harder.     Sometimes you can see how the extra fluid affects you because your hands, legs, or belly swell. You may also hold water around your heart and lungs, which makes it hard to breathe.      Even if you take medication for blood pressure or a water pill (diuretic) to remove fluid, it is still important to have less salt in your diet.     Check with your primary care provider before drinking alcohol since it may affect the amount of fluid in your body and how your heart, kidneys, or liver work.    If asked to follow a fluid restriction by your care provider, then do so.      Sodium in Food  A low-sodium meal plan limits the sodium that you get from food and beverages to 1,500-2,000 milligrams (mg) per day. Salt is the main source of sodium. Read the nutrition label on the package to find out how much sodium is in one serving of a food.    Select foods with 140 milligrams (mg) of sodium or less per serving.    You may be able to eat one or two servings of foods with a little more than 140 milligrams (mg) of sodium if you are closely watching how much sodium you eat in a day.    Check the serving size on the label. The amount of sodium listed on the label shows the amount in one serving of the food. So, if you eat more than one serving, you will get more sodium than the amount listed.  Tips  Cutting Back on Sodium    Eat more fresh foods.     o Fresh fruits and vegetables are low in sodium, as well as frozen vegetables and fruits that have no added juices or sauces.  o Fresh meats are lower in sodium than processed  meats, such as glass, sausage, and hotdogs.     Not all processed foods are unhealthy, but some processed foods may have too much sodium.    Eat less salt at the table and when cooking. One of the ingredients in salt is sodium.   o One teaspoon of table salt has 2,300 milligrams of sodium.  o Leave the salt out of recipes for pasta, casseroles, and soups.    Be a smart .   o Food packages that say  Salt-free , sodium-free ,  very low sodium,  and  low sodium  have less than 140 milligrams of sodium per serving.   o Beware of products identified as  Unsalted,   No Salt Added,   Reduced Sodium,  or  Lower Sodium.  These items may still be high in sodium. You should always check the nutrition label.     Add flavors to your food without adding sodium.   o Try lemon juice, lime juice, or vinegar.   o Dry or fresh herbs add flavor.   o Buy a sodium-free seasoning blend or make your own at home.    You can purchase salt-free or sodium-free condiments like barbeque sauce in stores and online. Ask your registered dietitian nutritionist for recommendations and where to find them.     Eating in Restaurants  Choose foods carefully when you eat outside your home. Restaurant foods can be very high in sodium. Many restaurants provide nutrition facts on their menus or their websites. If you cannot find that information, ask your . Let your  know that you want your food to be cooked without salt and that you would like your salad dressing and sauces to be served on the side.      Foods Recommended  Food Group Foods Recommended   Grains Bread, bagels, rolls without salted tops  Homemade bread made with reduced-sodium baking powder  Cold cereals, especially shredded wheat and puffed rice  Oats, grits, or cream of wheat  Pastas, quinoa, and rice  Popcorn, pretzels or crackers without salt  Corn tortillas   Protein Foods Fresh meats and fish; turkey glass (check the nutrition labels - make sure they are not packaged  in a sodium solution)  Canned or packed tuna (no more than 4 ounces at 1 serving)  Beans and peas  Soybeans) and tofu  Eggs  Nuts or nut butters without salt   Dairy Milk or milk powder  Plant milks, such as rice and soy  Yogurt, including Greek yogurt  Small amounts of natural cheese (blocks of cheese) or reduced-sodium cheese can be used in moderation. (Swiss, ricotta, and fresh mozzarella cheese are lower in sodium than the others)  Cream Cheese  Low sodium cottage cheese   Vegetables Fresh and frozen vegetables without added sauces or salt  Homemade soups (without salt)  Low-sodium, salt-free or sodium-free canned vegetables and soups   Fruit Fresh and canned fruits  Dried fruits, such as raisins, cranberries, and prunes   Oils Tub or liquid margarine, regular or without salt  Canola, corn, peanut, olive, safflower, or sunflower oils   Condiments Fresh or dried herbs such as basil, bay leaf, dill,  mustard (dry), nutmeg, paprika, parsley, rosemary, nancy, or thyme.              Low sodium ketchup  Vinegar                                   Lemon or lime juice  Pepper, red pepper flakes, and cayenne.  Hot sauce contains sodium, but if you use just a drop or two, it will not add up to much.                 Salt-free or sodium-free seasoning mixes and marinades  Simple salad dressings: vinegar and oil        Foods Not Recommended  Food Group Foods Not Recommended   Grains Breads or crackers topped with salt  Cereals (hot/cold) with more than 300 mg sodium per serving  Biscuits, cornbread, and other  quick  breads prepared with baking soda  Pre-packaged bread crumbs  Seasoned and packaged rice and pasta mixes  Self-rising flours   Protein Foods Cured meats: [place], ham, sausage, pepperoni and hot dogs  Canned meats (chili, rosalba sausage, or sardines)  Smoked fish and meats  Frozen meals that have more than 600 mg of sodium per serving  Egg substitute (with added sodium)   Dairy Buttermilk  Processed cheese  spreads  Cottage cheese (1 cup may have over 500 mg of sodium; look for low-sodium.)  American or feta cheese  Shredded Cheese has more sodium than blocks of cheese  String cheese   Vegetables Canned vegetables (unless they are salt-free, sodium-free or low sodium)  Frozen vegetables with seasoning and sauces  Sauerkraut and pickled vegetables  Canned or dried soups (unless they are salt-free, sodium-free, or low  sodium)  French fries and onion rings   Fruit Dried fruits preserved with additives that have sodium   Oils Salted butter or margarine, all types of olives   Condiments Salt, sea salt, kosher salt, onion salt, and garlic salt  Seasoning mixes with salt  Bouillon cubes  Ketchup  Barbeque sauce and Worcestershire sauce unless low  sodium  Soy sauce  Salsa, pickles, olives, relish  Salad dressings: ranch, blue cheese, Italian, and French.   Low Sodium Sample 1-Day Menu   Breakfast 1 cup cooked oatmeal   1 slice whole wheat bread toast  1 tablespoon peanut butter without salt   1 banana  1 cup 1% milk   Lunch Tacos made with: 2 corn tortillas     cup black beans, low sodium    cup roasted or grilled chicken (without skin)     avocado  Squeeze of lime juice   1 cup salad greens   1 tablespoon low-sodium salad dressing     cup strawberries  1 orange   Afternoon Snack 1/3 cup grapes   6 ounces yogurt   Evening Meal 3 ounces herb-baked fish   1 baked potato  2 teaspoons olive oil     cup cooked carrots  2 thick slices tomatoes on:  2 lettuce leaves  1 teaspoon olive oil  1 teaspoon balsamic vinegar  1 cup 1% milk   Evening Snack 1 apple     cup almonds without salt

## 2021-01-07 NOTE — PROGRESS NOTES
CLINICAL NUTRITION SERVICES    Reason for Assessment:  Low-sodium (2 g/day) nutrition education, received consult.    Diet History:  Unknown when pt last received low-sodium nutrition education in the past. Note, pt with LVAD. Attempts x 3 but pt was busy or out of the room.    Nutrition Diagnosis:  Unable to assess.    Nutrition Prescription/Recs:  Continue low-sodium diet and fluid restriction as per team.       Interventions:  Nutrition Education: Attempted to offer low-sodium nutrition education. Pt was busy or out of his room. Placed low-sodium nutrition education (Low Sodium Nutrition Therapy from the Nutrition Care Manual) in his discharge instructions.     Goals:    Pt will verbalize at least five high sodium foods and the importance of avoiding added salt to foods for cooking or seasoning foods.     Follow-up:   Patient to ask any further nutrition-related questions before discharge. In addition, pt may request outpatient RD appointment.     Abby Woods, MS, RD, LD, [facility]   6C Pgr: [phone]

## 2021-01-07 NOTE — PROGRESS NOTES
[facility] Cardiology Progress Note           Assessment and Plan:   Eliseo Tanner is a 65 year old with PMH NICM with LVEF 15-20%, NYHA III s/p HM III 2/18/2020(post op complicated by retrosternal hematoma with bleeding in the lungs), s/p ICD placed on 3/16/2020, h/o MSSA driveline infection and bacteremia 11/5/2020 on prophylactic cephalexin, h/o VT on amiodarone, moderate nonobstructive CAD, severe MR, atrial fibrillation on warfarin, hypertension, ABHINAV requiring CPAP, CKD IV, DMII, HLP,  h/o ANA, came for evaluation of multiple episodes of dizziness and near syncopal symptoms, concerning for arrhythmia with decompensated heart failure.    Changes today 1/7/21  - hold Bumex given worsening kidney function without signs of volume overload this AM  - RHC done today showed hypovolemic status  -  ml *2 doses given  - possibly discharge tomorrow     Multiple dizziness and near syncope episodes  Arrhythmia, unspecified  H/o Afib on warfarin, VT on amiodarone  Every episode was associated with palpitation and changes in LVAD parameteres. Interrogation of the device was done 1/4, however, still not clear which rhythm was he having given single lead ICD. EKG obtained during this visit showed regular, wide QRS without P waves concerning for possible slow VT vs Afib(hard to interprete given artifacts); changes noticed compared to prior EKG in March which showed atrial fibrillation. Denies any chest pain, orthopnea, or PND. Denies any contraction episodes or loss of consciousness episode. Neurological exams are intact. Hit his head once without traumatic lesions on exam, CT head w/o showed no acute intracranial pathology.    No changes in symptoms overnight. Got RHC today which showed hypovolemic status. Will replace with NSS. Possibly discharge tomorrow if clinically improved with downtrending creatinine with hydration.    - RHC: mRA 5, EDP 5, mPA 13, PCWP 3, likely hypovolemic status  -  ml*2 given for  hydration  - Trend BMP, Mg; keep K >4, Mg >2   - monitor Telemetry  - continue Amiodarone  - continue Warfarin     Decompensated heart failure  NICM with LVEF 10-15% NYHA III s/p HM III 02/20 & single lead ICD 03/20  Has been feeling fatigue. Denies any SOB, orthopnea, or PND. Weight has been in between 203-206 lbs. However, he is mildly tachycardic on exam with CVP~13 without peripheral edema. Labs showed some worsening kidney function compared to 7 days ago. Concerning for decompensation heart failure associated with arrhythmia.    - Echo ordered, result noted below  - hold pta Bumex, start Bumex 2 mg iv q12h  - replace potassium prn given worsening kidney function, will consider schedule potassium tomorrow   - Heart failure management              ACEI/ARB: continue lisinopril 10 mg daily              Afterload reduction: hydralazine 100 mg TID              Diuretic: hold pta Bumex, hold bumex IV               No BB/Spironolactone given kidney function              LVAD: continue ASA, atorvastatin               Hold pta potassium supplement     #Increase creatinine elevation, worsening  Possibly BERNARDA on top CKD stage IV. Hasn't improved after diuretics.  - Waiting for RHC  - trend BMP  - replace lytes as needed     #H/o MSSA bacteremia with driveline infection  Previously admit in 11/2020. TTE & CHAMP without vegetation seen. Finished 6 wk course of IV abx on 12/27/20 and later switched to Cephalexin 500 mg daily on 12/28 until now. Denies fever/chills. No other localizing signs of infection.  - continue cephalexin 500 mg daily  - continue to monitor signs of infection  - CIS     Chronic conditions:  DM: continue glargine 8 unit at bedtime, Medium dose sliding scale, POCT glucose TID AC,HS  Hypertension: lisinopril, hydralazine  HLP: continue atorvastatin  ABHINAV: continue CPAP at night    Diet: Moderate consistent CHO, limited Na to < 2gm,   Fluid: no IV, continue oral fluid restriction 2L  DVT ppx: on warfarin,  pharmacy to dose warfarin placed  Guevara catheter: none  Code status: obtained from previous EMR; full code      Patient staffed with Dr. Fischer.     Salena Johns MD  Internal Medicine, PGY-1        Interval History:   Noticed 2 hiccups overnight along with changes in his LVAD parameters. Denies any lightheadedness or dizziness during the episodes. Denies any chest pain, SOB, N/V.           Review of Systems:   The Review of Systems is negative other than noted in the HPI          Physical Exam (Resident / Clinician):   Vitals were reviewed  Temp: 99  F (37.2  C) Temp src: Oral BP: 92/70 Pulse: 82   Resp: 18 SpO2: 100 % O2 Device: None (Room air)    GA: NAD  HEENT: CVP ~8  Lungs: clear on auscultation both lungs  CVS: Irregular, normal S1S2, LVAD hums, no peripheral edema  Abd: LVAD driveline looks intact, no signs of infection, normal bowel sounds, soft, nontender  Ext: no joint swelling or signs of infection  Neuro: A&O, grossly intact          Data:   CBC RESULTS:   Recent Labs   Lab Test 01/05/21  1419   WBC 7.8   RBC 5.07   HGB 10.8*   HCT 36.7*   MCV 72*   MCH 21.3*   MCHC 29.4*   RDW 24.6*        Last Comprehensive Metabolic Panel:  Sodium   Date Value Ref Range Status   01/06/2021 135 133 - 144 mmol/L Final     Potassium   Date Value Ref Range Status   01/06/2021 4.3 3.4 - 5.3 mmol/L Final     Chloride   Date Value Ref Range Status   01/06/2021 102 94 - 109 mmol/L Final     Carbon Dioxide   Date Value Ref Range Status   01/06/2021 23 20 - 32 mmol/L Final     Anion Gap   Date Value Ref Range Status   01/06/2021 10 3 - 14 mmol/L Final     Glucose   Date Value Ref Range Status   01/06/2021 145 (H) 70 - 99 mg/dL Final     Urea Nitrogen   Date Value Ref Range Status   01/06/2021 105 (H) 7 - 30 mg/dL Final     Creatinine   Date Value Ref Range Status   01/06/2021 2.85 (H) 0.66 - 1.25 mg/dL Final     GFR Estimate   Date Value Ref Range Status   01/06/2021 22 (L) >60 mL/min/[1.73_m2] Final      Comment:     Non  GFR Calc  Starting 12/18/2018, serum creatinine based estimated GFR (eGFR) will be   calculated using the Chronic Kidney Disease Epidemiology Collaboration   (CKD-EPI) equation.       Calcium   Date Value Ref Range Status   01/06/2021 8.7 8.5 - 10.1 mg/dL Final     Echo LVAD 1/5/20  -Heartmate III LVAD, speed 5500 RPM   -Left ventricular size is normal. Severely (EF 10-15%) reduced left ventricular  function is present.  -LVAD cannula is not well seen in the LV apex. The outflow graft is well  visualized and Doppler is normal .  -Mild to moderate right ventricular dilation is present. Global right  ventricular function is mildly to moderately reduced.  -Aortic valve opens with every heart beat.  -The inferior vena cava was normal in size with preserved respiratory  variability.  -No pericardial effusion is present.    RHC 1/7/21  Right sided filling pressures are normal.  Left sided filling pressures are normal.  Normal PA pressures.  Normal cardiac output level.

## 2021-01-08 ENCOUNTER — DOCUMENTATION ONLY (OUTPATIENT)
Dept: ANTICOAGULATION | Facility: CLINIC | Age: 66
End: 2021-01-08

## 2021-01-08 ENCOUNTER — APPOINTMENT (OUTPATIENT)
Dept: OCCUPATIONAL THERAPY | Facility: CLINIC | Age: 66
DRG: 640 | End: 2021-01-08
Payer: MEDICARE

## 2021-01-08 VITALS
HEIGHT: 67 IN | WEIGHT: 208.5 LBS | RESPIRATION RATE: 16 BRPM | TEMPERATURE: 98.6 F | BODY MASS INDEX: 32.72 KG/M2 | HEART RATE: 94 BPM | SYSTOLIC BLOOD PRESSURE: 97 MMHG | OXYGEN SATURATION: 97 % | DIASTOLIC BLOOD PRESSURE: 77 MMHG

## 2021-01-08 DIAGNOSIS — I50.22 CHRONIC SYSTOLIC CONGESTIVE HEART FAILURE (H): ICD-10-CM

## 2021-01-08 DIAGNOSIS — I48.0 PAROXYSMAL ATRIAL FIBRILLATION (H): ICD-10-CM

## 2021-01-08 DIAGNOSIS — Z95.811 LVAD (LEFT VENTRICULAR ASSIST DEVICE) PRESENT (H): ICD-10-CM

## 2021-01-08 LAB
ANION GAP SERPL CALCULATED.3IONS-SCNC: 8 MMOL/L (ref 3–14)
BUN SERPL-MCNC: 78 MG/DL (ref 7–30)
CALCIUM SERPL-MCNC: 8.6 MG/DL (ref 8.5–10.1)
CHLORIDE SERPL-SCNC: 107 MMOL/L (ref 94–109)
CO2 SERPL-SCNC: 22 MMOL/L (ref 20–32)
CREAT SERPL-MCNC: 1.93 MG/DL (ref 0.66–1.25)
GFR SERPL CREATININE-BSD FRML MDRD: 35 ML/MIN/{1.73_M2}
GLUCOSE BLDC GLUCOMTR-MCNC: 105 MG/DL (ref 70–99)
GLUCOSE BLDC GLUCOMTR-MCNC: 137 MG/DL (ref 70–99)
GLUCOSE SERPL-MCNC: 114 MG/DL (ref 70–99)
INR PPP: 2.48 (ref 0.86–1.14)
MAGNESIUM SERPL-MCNC: 2.5 MG/DL (ref 1.6–2.3)
POTASSIUM SERPL-SCNC: 4.3 MMOL/L (ref 3.4–5.3)
SODIUM SERPL-SCNC: 137 MMOL/L (ref 133–144)

## 2021-01-08 PROCEDURE — 36415 COLL VENOUS BLD VENIPUNCTURE: CPT | Performed by: STUDENT IN AN ORGANIZED HEALTH CARE EDUCATION/TRAINING PROGRAM

## 2021-01-08 PROCEDURE — 80048 BASIC METABOLIC PNL TOTAL CA: CPT | Performed by: STUDENT IN AN ORGANIZED HEALTH CARE EDUCATION/TRAINING PROGRAM

## 2021-01-08 PROCEDURE — 999N001017 HC STATISTIC GLUCOSE BY METER IP

## 2021-01-08 PROCEDURE — 99239 HOSP IP/OBS DSCHRG MGMT >30: CPT | Mod: GC | Performed by: INTERNAL MEDICINE

## 2021-01-08 PROCEDURE — 97110 THERAPEUTIC EXERCISES: CPT | Mod: GO

## 2021-01-08 PROCEDURE — 250N000013 HC RX MED GY IP 250 OP 250 PS 637: Performed by: INTERNAL MEDICINE

## 2021-01-08 PROCEDURE — 83735 ASSAY OF MAGNESIUM: CPT | Performed by: STUDENT IN AN ORGANIZED HEALTH CARE EDUCATION/TRAINING PROGRAM

## 2021-01-08 PROCEDURE — 85610 PROTHROMBIN TIME: CPT | Performed by: STUDENT IN AN ORGANIZED HEALTH CARE EDUCATION/TRAINING PROGRAM

## 2021-01-08 PROCEDURE — 250N000013 HC RX MED GY IP 250 OP 250 PS 637: Performed by: STUDENT IN AN ORGANIZED HEALTH CARE EDUCATION/TRAINING PROGRAM

## 2021-01-08 RX ORDER — WARFARIN SODIUM 4 MG/1
4 TABLET ORAL
Status: DISCONTINUED | OUTPATIENT
Start: 2021-01-08 | End: 2021-01-08

## 2021-01-08 RX ORDER — WARFARIN SODIUM 4 MG/1
4 TABLET ORAL
Status: COMPLETED | OUTPATIENT
Start: 2021-01-08 | End: 2021-01-08

## 2021-01-08 RX ADMIN — HYDRALAZINE HYDROCHLORIDE 100 MG: 100 TABLET, FILM COATED ORAL at 15:33

## 2021-01-08 RX ADMIN — WARFARIN SODIUM 4 MG: 4 TABLET ORAL at 16:17

## 2021-01-08 RX ADMIN — LISINOPRIL 10 MG: 10 TABLET ORAL at 08:44

## 2021-01-08 RX ADMIN — ALLOPURINOL 200 MG: 100 TABLET ORAL at 08:45

## 2021-01-08 RX ADMIN — FLUOXETINE 30 MG: 10 CAPSULE ORAL at 10:20

## 2021-01-08 RX ADMIN — ASPIRIN 81 MG CHEWABLE TABLET 81 MG: 81 TABLET CHEWABLE at 08:44

## 2021-01-08 RX ADMIN — MULTIPLE VITAMINS W/ MINERALS TAB 1 TABLET: TAB at 08:45

## 2021-01-08 RX ADMIN — OMEPRAZOLE 20 MG: 20 CAPSULE, DELAYED RELEASE ORAL at 08:45

## 2021-01-08 RX ADMIN — CEPHALEXIN 500 MG: 500 CAPSULE ORAL at 08:45

## 2021-01-08 RX ADMIN — HYDRALAZINE HYDROCHLORIDE 100 MG: 100 TABLET, FILM COATED ORAL at 08:44

## 2021-01-08 RX ADMIN — AMIODARONE HYDROCHLORIDE 200 MG: 200 TABLET ORAL at 08:44

## 2021-01-08 ASSESSMENT — ACTIVITIES OF DAILY LIVING (ADL)
ADLS_ACUITY_SCORE: 13

## 2021-01-08 ASSESSMENT — MIFFLIN-ST. JEOR: SCORE: 1679.38

## 2021-01-08 NOTE — PROGRESS NOTES
CLINICAL NUTRITION SERVICES    Reason for Assessment:  Low-sodium (2 g/day) nutrition education, received consult.     Diet History:  Per discussion with pt, reports receiving low-sodium nutrition education in the past. Noticed LVAD in place.     Nutrition Diagnosis:  No nutrition education dx    Nutrition Prescription/Recs:  Continue low-sodium diet, fluid restriction.      Interventions:  Nutrition Education: Offered verbal instruction on low-sodium meal planning. Pt declined need as he has received nutrition education in the past. Explained diet order. Has no questions today regarding his 2 g sodium diet and 2 L fluid restriction.     Goals:    Pt will verbalize at least five high sodium foods and the importance of avoiding added salt to foods for cooking or seasoning foods.     Follow-up:   Patient to ask any further nutrition-related questions before discharge. In addition, pt may request outpatient RD appointment.     Abby Woods, MS, RD, LD, [facility]   6C Pgr: [phone]

## 2021-01-08 NOTE — PLAN OF CARE
D: Pt who presents this admission with dizziness and near syncopal symptoms, concerning for arrhythmia w/ decompensated heart failure. Hx of NICM (EF 15-20%), s/p LVAD HM3 on 2/18/20, s/p ICD on 3/16/20, DL infection and bacteremia, VT on amio, mod nonobstructive CAD, severe MR, A.Fib, HTN, ABHINAV, CKD IV, DM type 2, HLP, and ANA.    I/A: Pt alert and oriented x4. Pt paced rhythm with HRs 80-100s. Doppler MAP 70s. LVAD free of alarms, numbers WDL. Dressing changed, driveline site with scant purulent-serosang drainage. Per pt, drainage has been going on for awhile now. On RA with O2 sats >92%. Home BiPAP at the bedside and in use when sleeping. Pt denies any pain, lightheaded and dizziness today. Up with SBA-independently in room. Pt was NPO this morning for RHC, now on 2g Na+ 2L FR diet. Appetite good. BG checks this shift 120, 132 and 149, sliding scale insulin given 1x. Per pt report, last BM yesterday. Pt voiding. RIJ site WDL, CMS intact. Dressing CDI. 500 ml NS bolus ordered 2x. First dose given, second dose currently running.    P: Continue to monitor and assess pt with every encounter. Notify Cards 2 with any changes or concerns.

## 2021-01-08 NOTE — PLAN OF CARE
DISCHARGE                         No discharge date for patient encounter.  ----------------------------------------------------------------------------  Discharged to: Home  Via: Automobile  Accompanied by: wife  Discharge Instructions: diet, activity, medications, follow up appointments, when to call the MD, aftercare instructions, and what to watchout for (i.e. s/s of infection, increasing SOB, palpitations, chest pain,)  Prescriptions: To be filled by pharmacy per pt's request; medication list reviewed & sent with pt  Follow Up Appointments: arranged; information given  Belongings: All sent with pt  IV: out  Telemetry: off  Pt exhibits understanding of above discharge instructions; all questions answered.    Discharge Paperwork: Signed, copied, and sent home with patient.

## 2021-01-08 NOTE — CONSULTS
Post LVAD  Patient Social Work Assessment     Patient Name: Eliseo Tanner  : 1955  Age: 65 year old  MRN: [id number]  Date of LVAD implant: 2020    Patient known to me from follow up in the LVAD program.  Admitted on 2021 for dizziness and near syncopal episodes concerning for arrhythmia .  Seen today to update assessment.      Presenting Information   Living Situation: Pt lives with his wife, in [place], [place].   Functional Status: Pt has been independent w/ ADLs, ambulation and driving   Cultural/Language/Spiritual Considerations: none    Support System  Primary Support Person Pt's wife, Veronique   Other support:  3 children: daughter ([place]), 2 sons ([place] & [place]) - very supportive and available for support  6 grandchildren  Plan for support in immediate post-hospital period: Anticipate return home w/ support as needed from his wife.    Health Care Directive  Decision Maker: Pt   Alternate Decision Maker: Per FOUZIA, pt's wife would be next appropriate decision maker   Health Care Directive: Copies have previously been given to pt    Mental Health/Coping:   History of Mental Health: None  History of Chemical Health: None   Current status: Stable-pt is doing much better with mood since last admission and completion of IV abx rgimen. Pt is on Prozac managed by PCP.  Coping: Pt has had some displeasure with the LVAD, but has had a challenging course his first year. Today, with pt feeling much better, he is optimistic.   Services Needed/Recommended: None     Financial   Income: Social Security Disability, wife's wages/income   Impact of LVAD on income: Minimal   Insurance and medication coverage: Yes   Financial concerns: none   Resources needed: none     Discharge Plan   Patient and family discharge goal: return home today  Barriers to discharge: none     Education provided by : Social Work role inpatient setting, availability of support groups    Assessment and recommendations and plan:       Pt is eager to discharge home today and has no concerns. Provided supportive check-in with pt's wife and she also does not have concerns. Pt's wife has writer's contact information should she have questions or concerns in the outpatient setting.

## 2021-01-08 NOTE — PROGRESS NOTES
ANTICOAGULATION  MANAGEMENT: Discharge Review    Eliseo Tanner chart reviewed for anticoagulation continuity of care    Hospital Admission on 1/5-1/8 for Dizziness and given IV Fluids.    Discharge disposition: Home    Results:    Recent labs: (last 7 days)     01/05/21  1419 01/06/21  0539 01/07/21  0531 01/08/21  0556   INR 2.16* 2.53* 2.40* 2.48*     Anticoagulation inpatient management:     home regimen continued    Anticoagulation discharge instructions:     Warfarin dosing: home regimen continued   Bridging: No   INR goal change: No      Medication changes affecting anticoagulation: No    Additional factors affecting anticoagulation: Yes: Continues with Amiodarone 200mg Daily, ASA 81mg Daily, and Keflex 500mg BID    Plan     No adjustment to anticoagulation plan needed    Patient not contacted    Anticoagulation Calendar updated    Luiza Perkins RN

## 2021-01-08 NOTE — PLAN OF CARE
Temp: 98.8  F (37.1  C) Temp src: Oral BP: (!) 81/71 Pulse: 88   Resp: 18 SpO2: 96 % O2 Device: None (Room air)        A:   Neuro: A/Ox4. Calls appropriately. Denies dizziness   Cardiac/Tele:  VVS. Low grade temp once at night. Resolved on its own. Tachy, Paced. LVAD numbers WNL. PI decreased to 1.8 for a moment and quickly went to 3.2. No symptoms reported. Doppler used- MAP 71.  Respiratory: Room air.  Home CPAP on at night  GI/: Continent. Urinal at bedside   Diet/Appetite: 2 gram Na+ diet, 2L FR  Skin: No new deficits noted, R internal jugular site WDL.  LDAs: R PIV-SL  Activity: Up ad tiffaine  Pain: Denies pain    P: Continue to monitor and notify team with changes.

## 2021-01-09 NOTE — DISCHARGE SUMMARY
[facility] Discharge Summary    Eliseo Tanner MRN# [id number]   Age: 65 year old YOB: 1955     Date of Admission:  1/5/2021  Date of Discharge::  1/8/2021  Admitting Physician:  Aaliyah Fischer MD  Discharge Physician:  Salena Johns MD          Admission Diagnoses:   Arrhythmia, unspecified          Discharge Diagnosis:   Dehydration          Procedures:   RHC 1/7/20: mRA 5, RV 24/5, mPA 13, PCWP 3, Jnae CI 2.64, PVR 1.85    Echo 1/5/20  -Heartmate III LVAD, speed 5500 RPM  -Left ventricular size is normal. Severely (EF 10-15%) reduced left ventricular  function is present.  -LVAD cannula is not well seen in the LV apex. The outflow graft is well  visualized and Doppler is normal .  -Mild to moderate right ventricular dilation is present. Global right  ventricular function is mildly to moderately reduced.  -Aortic valve opens with every heart beat.  -The inferior vena cava was normal in size with preserved respiratory  variability.  -No pericardial effusion is present.          Medications Prior to Admission:     No medications prior to admission.             Discharge Medications:     No current facility-administered medications for this encounter.      Current Outpatient Medications   Medication Sig     acetaminophen (TYLENOL) 325 MG tablet Take 2 tablets (650 mg) by mouth every 4 hours as needed for mild pain or fever     allopurinol (ZYLOPRIM) 100 MG tablet 2 tablets (200 mg) by Oral or Feeding Tube route daily     amiodarone (PACERONE) 200 MG tablet Take 1 tablet (200 mg) by mouth daily     aspirin (ASA) 81 MG EC tablet Take 1 tablet (81 mg) by mouth daily     atorvastatin (LIPITOR) 20 MG tablet Take 20 mg by mouth daily     bumetanide (BUMEX) 1 MG tablet Take 2mg in the morning and 1mg in the afternoon     cephALEXin (KEFLEX) 500 MG capsule Take 1 capsule (500 mg) by mouth 2 times daily     co-enzyme Q-10 200 MG CAPS 200 mg by Oral or Feeding Tube route daily      finasteride (PROSCAR) 5 MG tablet Take 1 tablet (5 mg) by mouth daily Helps with urinary retention.     FLUoxetine (PROZAC) 10 MG capsule Take 30 mg by mouth daily     hydrALAZINE (APRESOLINE) 50 MG tablet Take 2 tablets (100 mg) by mouth 3 times daily     insulin glargine (BASAGLAR KWIKPEN) 100 UNIT/ML pen Inject 10 Units Subcutaneous At Bedtime      lisinopril (ZESTRIL) 10 MG tablet Take 1 tablet (10 mg) by mouth daily     magnesium oxide (MAG-OX) 400 MG tablet 1 tablet (400 mg) by Oral or Feeding Tube route daily     multivitamin w/minerals (THERA-VIT-M) tablet Take 1 tablet by mouth daily     nitroGLYcerin (NITROSTAT) 0.4 MG sublingual tablet For chest pain place 1 tablet under the tongue every 5 minutes for 3 doses. If symptoms persist 5 minutes after 1st dose call 911.     omeprazole (PRILOSEC) 20 MG DR capsule Take 20 mg by mouth daily     potassium chloride ER (KLOR-CON M) 20 MEQ CR tablet Take 20 mEq by mouth daily     senna-docusate (SENOKOT-S/PERICOLACE) 8.6-50 MG tablet Take 1 tablet by mouth 2 times daily as needed for constipation     warfarin ANTICOAGULANT (COUMADIN) 2 MG tablet Take 4 mg by mouth daily      zolpidem (AMBIEN) 5 MG tablet Take 5 mg by mouth nightly as needed for Insomnia     insulin pen needle (BD JAIME U/F) 32G X 4 MM miscellaneous      tamsulosin (FLOMAX) 0.4 MG capsule Take 1 capsule (0.4 mg) by mouth daily This med helps with urinary retention             Consultations:   -EP consulted: unlikely that his symptoms are from arrhythmia given the episodes reviewed from device interrogation are likely from atrial fibrillation but ventricular rate were  bpm, unlikely to be the cause of his symptoms; see full consult note on 1/6/2020.         Brief History of Illness:   Eliseo Tanner is a 65 year old with PMH NICM with LVEF 15-20%, NYHA III s/p HM III 2/18/2020(post op complicated by retrosternal hematoma with bleeding in the lungs), s/p ICD placed on 3/16/2020[facility]  "[facility] and bacteremia 11/5/2020 on prophylactic cephalexin, h/o VT on amiodarone, moderate nonobstructive CAD, severe MR, atrial fibrillation on warfarin, hypertension, ABHINAV requiring CPAP, CKD IV, DMII, HLP,  h/o ANA, came for evaluation of multiple episodes of dizziness and near syncopal symptoms, concerning for arrhythmia with decompensated heart failure.    Symptoms started on [place] day where he experienced hiccup sensation between LVAD and ICD which he described as a \"pop\" or \"like the pump is slowing down\" accompanied by a sudden dizziness with near syncope symptoms followed by feeling fatigued, and having blurry vision. During the episodes he noticed some PI events from his LVAD parameters. Every episode lasted ~10 min and woke up feeling fine. Denies any headache, N/V. Denies any numbness or weakness in his face or body. Denies any difficulty speaking or swallowing. Denies any chest pain, SOB, orthopnea, PND, or sweating. His weight has been 203-206 lbs. Denies any associated fever, chills, shortness of breath, or other URI symptoms. No changes in his appetite. Denies bloody bowel movement, or changes in urine color/frequency.    He had a couple of similar episodes more at 1/1 and 1/2 as well. During a follow up telephone visit on 1/5, he felt unwell so he was advised to come         Hospital Course:   Eliseo Tanner is a 65 year old with PMH NICM with LVEF 15-20%, NYHA III s/p HM III 2/18/2020(post op complicated by retrosternal hematoma with bleeding in the lungs), s/p ICD placed on 3/16/2020, h/o MSSA driveline infection and bacteremia 11/5/2020 on prophylactic cephalexin, h/o VT on amiodarone, moderate nonobstructive CAD, severe MR, atrial fibrillation on warfarin, hypertension, ABHINAV requiring CPAP, CKD IV, DMII, HLP,  h/o ANA, came for evaluation of multiple episodes of dizziness and near syncopal symptoms, concerning for arrhythmia with decompensated heart failure.    Multiple dizziness and near " syncope episodes  Arrhythmia, unspecified  H/o Afib on warfarin, VT on amiodarone  Thought to be from dehydration given the trend of his creatinine and BUN in the past couple of months(starting in November) which could be the cause of PI events from LVAD monitor. LVAD cannula was seen in the expected anatomic position in the LV apex. HM3 at 5500RPM. LVIDd 48mm. RHC showed mRA 5, EDP 5, mPA 13, PCWP 3, likely hypovolemic status. Clinical status has improved after a total dose of 1L of normal saline. Bumex was held during the hospital stay.     Denies any chest pain, orthopnea, or PND. Denies any contraction episodes or loss of consciousness episode. Neurological exams are intact. Hit his head once without traumatic lesions on exam, CT head w/o showed no acute intracranial pathology.    - advice to hold Bumex&Potassium after discharge, can restart 1 mg if his weight is up by 5 lbs or if he starting to notice peripheral edema.    - otherwise no changes in his other medications  - need to follow up with LVAD clinic within one week after discharge + BMP, Mg , INR    Decompensated heart failure  NICM with LVEF 10-15% NYHA III s/p HM III 02/20 & single lead ICD 03/20  Continue with pta heart failure management. Holding Bumex and Potassium supplement.  - Heart failure management              ACEI/ARB: continue lisinopril 10 mg daily              Afterload reduction: hydralazine 100 mg TID              Diuretic: hold pta Bumex, hold bumex IV               No BB/Spironolactone given kidney function              LVAD: continue ASA, atorvastatin               Hold pta potassium supplement     #Increase creatinine elevation, worsening  Improved after hydration. Creatinine came down from 2.64 --> 1.93.  -Need another lab check within a week after discharge.     #H/o MSSA bacteremia with driveline infection  Previously admit in 11/2020. TTE & CHAMP without vegetation seen. Finished 6 wk course of IV abx on 12/27/20 and later  switched to Cephalexin 500 mg daily on 12/28 until now. Denies fever/chills. No other localizing signs of infection.  - continue cephalexin 500 mg daily while in the hospital    Chronic conditions:  DM: continue glargine 8 unit at bedtime(on 10 unit at home), Medium dose sliding scale, POCT glucose TID AC,HS  Hypertension: lisinopril, hydralazine  HLP: continue atorvastatin  ABHINAV: continue CPAP at night     Diet: Moderate consistent CHO, limited Na to < 2gm,   Fluid: no IV, continue oral fluid restriction 2L  DVT ppx: on warfarin, pharmacy to dose warfarin placed  Guevara catheter: none  Code status: obtained from previous EMR; full code           Discharge Instructions and Follow-Up:   1. Hold his Bumex after discharge given concerns for dehydration.   2. Advice to retake his Bumex if weight came up by 5 lbs or started to have edema in his legs.  3. Need to follow up with LVAD coordinator within a week after discharge.         Discharge Disposition:   Discharged to home      Patient staffed with Dr. Judd.    Salena Johns MD  Internal Medicine, PGY-1      I,Veronica Judd MD, have seen and examined this patient. I have discussed with the team and agree with the findings and discharge plan. I have reviewed the hospital course with the patient and have spent 35 minutes in the process of discharge, including discharge planning with patient education, medication teaching, and the coordination of care to outpatient providers.  It has been a pleasure to participate in the care of your patient - please do not hesitate to contact me with any questions.     Veronica Judd MD  Section Head - Advanced Heart Failure, Transplantation and Mechanical Circulatory Support  Director - Adult Congenital and Cardiovascular [facility]etics [facility]  Associate Professor of Medicine, [facility]

## 2021-01-11 ENCOUNTER — PATIENT OUTREACH (OUTPATIENT)
Dept: CARE COORDINATION | Facility: CLINIC | Age: 66
End: 2021-01-11

## 2021-01-11 ENCOUNTER — CARE COORDINATION (OUTPATIENT)
Dept: CARDIOLOGY | Facility: CLINIC | Age: 66
End: 2021-01-11

## 2021-01-11 DIAGNOSIS — Z95.810 S/P ICD (INTERNAL CARDIAC DEFIBRILLATOR) PROCEDURE: ICD-10-CM

## 2021-01-11 DIAGNOSIS — I50.22 CHRONIC SYSTOLIC CONGESTIVE HEART FAILURE (H): Primary | ICD-10-CM

## 2021-01-11 DIAGNOSIS — I50.23 ACUTE ON CHRONIC SYSTOLIC CONGESTIVE HEART FAILURE (H): Primary | ICD-10-CM

## 2021-01-11 DIAGNOSIS — I42.8 NONISCHEMIC CARDIOMYOPATHY (H): ICD-10-CM

## 2021-01-11 RX ORDER — BUMETANIDE 1 MG/1
1 TABLET ORAL DAILY
Qty: 90 TABLET | Refills: 3 | Status: SHIPPED | OUTPATIENT
Start: 2021-01-11 | End: 2021-01-29

## 2021-01-11 NOTE — PROGRESS NOTES
[facility]: Post-Discharge Note  SITUATION                                                      Admission:    Admission Date: 01/05/21   Reason for Admission: Arrhythmia,  Discharge:   Discharge Date: 01/08/21  Discharge Diagnosis: Arrhythmia,    BACKGROUND                                                      Eliseo Tanner is a 65 year old with PMH NICM with LVEF 15-20%, NYHA III s/p HM III 2/18/2020(post op complicated by retrosternal hematoma with bleeding in the lungs), s/p ICD placed on 3/16/2020, h/o MSSA driveline infection and bacteremia 11/5/2020 on prophylactic cephalexin, h/o VT on amiodarone, moderate nonobstructive CAD, severe MR, atrial fibrillation on warfarin, hypertension, ABHINAV requiring CPAP, CKD IV, DMII, HLP,  h/o ANA, came for evaluation of multiple episodes of dizziness and near syncopal symptoms, concerning for arrhythmia with decompensated heart failure.    ASSESSMENT      Discharge Assessment  Patient reports symptoms are: Improved  Does the patient have all of their medications?: Yes  Does patient have a follow-up appointment scheduled?: Yes  Does patient have any other questions or concerns?: No    Post-op  Did the patient have surgery or a procedure: Yes  Incision: healing  Drainage: No  Bleeding: none  Fever: No  Chills: No  Redness: No  Warmth: No  Swelling: No  Incision site pain: No  Eating & Drinking: eating and drinking without complaints/concerns  PO Intake: regular diet  Bowel Function: normal  Urinary Status: voiding without complaint/concerns         PLAN                                                      Outpatient Plan:  1. Hold his Bumex after discharge given concerns for dehydration.   2. Advice to retake his Bumex if weight came up by 5 lbs or started to have edema in his legs.  3. Need to follow up with LVAD coordinator within a week after discharge.    Future Appointments   Date Time Provider Department Center   1/19/2021 10:00 AM  LAB [facility]    1/20/2021  1:30 PM [facility]   1/20/2021  3:00 PM  CV DEVICE 1 [facility]   1/29/2021 11:00 AM Daniel Yoon MD [facility]   3/10/2021  9:30 AM  LAB [facility]   3/10/2021 10:00 AM  CV DEVICE 1 [facility]   3/10/2021 10:30 AM Mervat Decker PA-C [facility]   3/10/2021 11:30 AM U2A ROOM 5 [facility]   6/10/2021 12:00 AM UC ICD REMOTE [facility]   6/15/2021  2:00 PM Nader Cisneros MD [facility]           Stephanie Chan, [facility]

## 2021-01-11 NOTE — LETTER
Patient Name: Eliseo Tanner   : 1955   Diagnosis/ICD-10: Heart Failure, unspecified I50.9; LVAD Z95.811   Requesting Physician: Dr. Nader Cisneros   Date of Request: 21   Date to Draw 2021             **Please fax results to Gladys VANESSA RN VAD Coord at [phone].                               Call [phone] with Questions    ORDER CODE TESTS NANCY.VOL.   X CBASIC Na, K, Cl, CO2, Crea, BUN, Glu, Ca GG 0.5-1    BHEPAT Alb, AlkP, ALT, AST, BBil, TP GG 0.5-1    BLIP Chol, Trig, HDL, LDL GG 1-2    CCOMP Na, K, Cl, CO2, Crea, BUN, Glu, Ca, Alb, AlkP, ALT, AST, Bbil, TP,  GG 0.6-1    HGP CBC & Platelet P 0.3-1    HGDP CBC, Differential & Platelet P 0.3-1    PLT Platelet  P 0.3-1    INR INR B 2.7    PTT PTT B 2.7    LD Lactate Dehydrogenase GG 0.3-1    PHGB Plasma Hemoglobin GG 2-3    Pre-Albumin Pre-Albumin RG 1.0                   Signed,      Nader Cisneros MD  Heart Failure, Mechanical Circulatory Support and Transplant Cardiology   of Medicine,  Division of Cardiology, [facility]

## 2021-01-12 ENCOUNTER — TELEPHONE (OUTPATIENT)
Dept: CARDIOLOGY | Facility: CLINIC | Age: 66
End: 2021-01-12

## 2021-01-12 ENCOUNTER — ANTICOAGULATION THERAPY VISIT (OUTPATIENT)
Dept: ANTICOAGULATION | Facility: CLINIC | Age: 66
End: 2021-01-12

## 2021-01-12 DIAGNOSIS — I50.22 CHRONIC SYSTOLIC CONGESTIVE HEART FAILURE (H): ICD-10-CM

## 2021-01-12 DIAGNOSIS — I48.0 PAROXYSMAL ATRIAL FIBRILLATION (H): ICD-10-CM

## 2021-01-12 DIAGNOSIS — Z95.811 LVAD (LEFT VENTRICULAR ASSIST DEVICE) PRESENT (H): ICD-10-CM

## 2021-01-12 LAB — INR PPP: 2.2 (ref 0.9–1.1)

## 2021-01-12 NOTE — TELEPHONE ENCOUNTER
Called pt & wife to discuss appt schedule.  Per wife, Veronique, pt uncomfortable coming to his appointment on inauguration day.  I informed Veronique, that pt needs to be seen as soon as possible to follow up to his hospitalization.  She informed me that pt has had a couple of dizzy spells, one in which he fell on to his knee while in [place].  Pt did not share this info with writer on yesterdays, multiple, phone calls.  Wife also shared that driveline exit site looks worse since being admitted.  Instructed both pt (yesterday) and Veronique that pt needs to contact Dr. Bhatti to discuss driveline asap.  Discussed the importance of needed follow up appointment. Pt and Veronique instructed to call scheduling to reschedule if that is needed.  Veronique verbalized understanding of the instructions given.  Will call VAD coordinator with further needs and questions.

## 2021-01-12 NOTE — TELEPHONE ENCOUNTER
[facility] Call Center    Phone Message    May a detailed message be left on voicemail: yes     Reason for Call: Other: Pt needs to reschedule his friday appts. Mervat'a template times were all in the afternoon and pt is hoping for something in the morning. Please give pt a call back to discuss if he can be seen by Mervat in the morning or earlier than 1 pm any of the days following 1/15. He also has appts booked for 3/10 so please check to make sure he needs both appointments.     Action Taken: Message routed to:  [facility] cardio    Travel Screening: Not Applicable

## 2021-01-12 NOTE — PROGRESS NOTES
ANTICOAGULATION FOLLOW-UP CLINIC VISIT    Patient Name:  Eliseo Tanner  Date:  2021  Contact Type:  Telephone    SUBJECTIVE:  Patient Findings         Clinical Outcomes     Negatives:  Major bleeding event, Thromboembolic event, Anticoagulation-related hospital admission, Anticoagulation-related ED visit, Anticoagulation-related fatality           OBJECTIVE    Recent labs: (last 7 days)     21   INR 2.2*       ASSESSMENT / PLAN  INR assessment THER    Recheck INR In: 2 WEEKS    INR Location Outside lab      Anticoagulation Summary  As of 2021    INR goal:  2.0-3.0   TTR:  90.9 % (9.2 mo)   INR used for dosin.2 (2021)   Warfarin maintenance plan:  4 mg (4 mg x 1) every day   Full warfarin instructions:  4 mg every day   Weekly warfarin total:  28 mg   No change documented:  Gianna Allen RN   Plan last modified:  Krysta Mcdaniel RN (2020)   Next INR check:  2021   Priority:  Critical   Target end date:  Indefinite    Indications    LVAD (left ventricular assist device) present (H) [Z95.811]  Chronic systolic congestive heart failure (H) [I50.22]  Paroxysmal atrial fibrillation (H) [I48.0]             Anticoagulation Episode Summary     INR check location:      Preferred lab:      Send INR reminders to:  [facility]    Comments:  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  HM 3  placed 2020, 81mg ASA, Speak to spouse, Veronique at [phone]  2020:  Lab: [facility]:  ph: [phone] opt 5;   fax: [phone]      Anticoagulation Care Providers     Provider Role Specialty Phone number    Nader Cisneros MD Referring Cardiovascular Disease [phone]            See the Encounter Report to view Anticoagulation Flowsheet and Dosing Calendar (Go to Encounters tab in chart review, and find the Anticoagulation Therapy Visit)    Spoke with patient. Gave them their lab results and new warfarin  recommendation.  No changes in health, medication, or diet. No missed doses, no falls. No signs or symptoms of bleed or clotting.     Patient had LVAD placed on:   2/18/20  Type of LVAD: HM3  Patient's current Aspirin dose: 81mg  LVAD Protocol followed: yes, INR in two weeks    PACO MARQUEZ RN

## 2021-01-13 NOTE — TELEPHONE ENCOUNTER
[facility] Call Center    Phone Message    May a detailed message be left on voicemail: yes     Reason for Call: Other: Eliseo calling to follow up on the request for his appointments to be rescheduled within the parameters that he discussed yesterday.  He is asking for Crystal to give him a call when she gets a chance.  Please call him back at your earliest convenience     Action Taken: Message routed to:  [facility]  Cardiology    Travel Screening: Not Applicable

## 2021-01-14 LAB
ANION GAP SERPL CALCULATED.3IONS-SCNC: 10 MMOL/L
BUN SERPL-MCNC: 48 MG/DL
CALCIUM SERPL-MCNC: 9.2 MG/DL
CHLORIDE SERPLBLD-SCNC: 100 MMOL/L
CO2 SERPL-SCNC: 25 MMOL/L
CREAT SERPL-MCNC: 2.1 MG/DL
GFR SERPL CREATININE-BSD FRML MDRD: 34 ML/MIN/1.73M2
GLUCOSE SERPL-MCNC: 112 MG/DL (ref 70–99)
POTASSIUM SERPL-SCNC: 4.7 MMOL/L
SODIUM SERPL-SCNC: 135 MMOL/L

## 2021-01-18 ENCOUNTER — TELEPHONE (OUTPATIENT)
Dept: INFECTIOUS DISEASES | Facility: CLINIC | Age: 66
End: 2021-01-18

## 2021-01-18 NOTE — TELEPHONE ENCOUNTER
[facility] Call [facility]    Phone Message    May a detailed message be left on voicemail: yes     Reason for Call: Other: Pt stated that he had seen Dr. Bhatti in regards to his drive line infection, and that had placed him on some antibiotics. Pty last appt 12/11/20. Pt stated that he is still taking a pill antibiotic right now, but that he is concerned there is still some infection. Writer called clinic priority line, but no one answered. Please review, and call Pt back.     Action Taken: Message routed to:  [facility] ID    Travel Screening: Not Applicable

## 2021-01-19 ENCOUNTER — CARE COORDINATION (OUTPATIENT)
Dept: CARDIOLOGY | Facility: CLINIC | Age: 66
End: 2021-01-19

## 2021-01-19 ENCOUNTER — DOCUMENTATION ONLY (OUTPATIENT)
Dept: CARE COORDINATION | Facility: CLINIC | Age: 66
End: 2021-01-19

## 2021-01-19 NOTE — PROGRESS NOTES
D:  Called pt to check in and follow up to repeat labs.  Pt reports dizziness has improved, and is only occurring when he changes positions too quickly.  His weight is 212 today (208/209 @ discharge)  He does feel that his belly is slightly more full since discharge.  Ciro's reporting increased drainage at exit site.  I:  Labs and situation discussed with Dr. Cisneros.  No change to plan of care at this time.  We will continue to monitor for any worsening of symptoms.  Pt to call for those things.  Message sent to infectious disease team to follow up with Eliseo as soon as possible, regarding his driveline infection.  A:  Follow up  P:  Pt and Veronique, verbalized understanding of the instructions given.  Will call VAD coordinator with further needs and questions.

## 2021-01-21 NOTE — TELEPHONE ENCOUNTER
----- Message from Negar Bhatti DO sent at 1/21/2021  3:49 PM CST -----  Antonette or Wendy,  Could either of you call the patient to touch [facility] and assess patients symptoms? I don't expect drainage to be completely non-existence but he can alert coordinators if he develops fevers, chills, increased swelling or redness. He is chronically on an oral antibiotic for suppression.     Thank you!    Negar  ----- Message -----  From: Sharon Mccarthy RN  Sent: 1/19/2021  12:02 PM CST  To: Negar Bhatti DO, #    In addition to the appointment, can one of your team members please call them to touch base?  I think this would ease their worries.    Gladys  ----- Message -----  From: Negar Bhatti DO  Sent: 1/19/2021  11:57 AM CST  To: Sharon Mccarthy RN, #    Hi Wendy,  Can we get Mr. Tanner scheduled w/ me in the next 2-4 weeks?    Negar  ----- Message -----  From: Sharon Mccarthy RN  Sent: 1/19/2021  11:49 AM CST  To: Negar Bhatti DO, #    No additional signs, no fever/chills.  Only increased drainage.    Gladys  ----- Message -----  From: Negar Bhatti DO  Sent: 1/19/2021  11:43 AM CST  To: Sharon Mccarthy RN, #    Hi Sharon,  Does he have other systemic signs of a deeper infection? Like fever, chills? This will help triage how quickly we need to see him. We have him on chronic antibiotics for this known infection.     Negar Bhatti  ----- Message -----  From: Sharon Mccarthy RN  Sent: 1/19/2021   9:49 AM CST  To: Negar Bhatti DO, #    Good morning,  Mr. Tanner has called me a couple times in regard to the drainage at his driveline exit site.  His wife, Veronique, feels that since his hospital stay, his wound has increased in drainage amounts.  She is hoping to touch base with you all sooner rather than later.  Please let me know if there is anything I can do to help with this.    Thank you  Gladys

## 2021-01-21 NOTE — TELEPHONE ENCOUNTER
NATALIAM for Eliseo and Chapis on their home phone line. Gave my direct # for call back. Will try again tomorrow AM as well.  Antonette Pittman RN

## 2021-01-22 ENCOUNTER — CARE COORDINATION (OUTPATIENT)
Dept: CARDIOLOGY | Facility: CLINIC | Age: 66
End: 2021-01-22

## 2021-01-22 NOTE — PROGRESS NOTES
D:  Rcvd call from pt and wife asking about COVID vaccine.  Pt also advised that he had another episode wher his legs have out.  He did not lose consciousness, VAD numbers were stable and no dizziness present.  I:  Pt advised he is cleared from a cardiac stand point to have the vaccine when it is offered to him.  Situation discussed with Dr. Cisneros regarding legs.  Pt advised he should follow up with his primary MD asap in regard to these episodes, seeing as they don't appear to be cardiac in origin.    A:  Questions  P:  Pt verbalized understanding of the instructions given.  Will call VAD coordinator with further needs and questions.

## 2021-01-22 NOTE — TELEPHONE ENCOUNTER
"Spoke w/Eliseo via phone. He does not have any sx's of fevers, chills, increased pain, swelling, or redness around the DLES. He has noticed only more \"snotty-looking\" drainage around the DLES. He remains on PO Keflex BID. We scheduled him for a visit with Dr. Bhatti on 2/4/21. He knows to contact us or his VAD coordinator if he develops any new sx's.  Antonette Pittman RN    "

## 2021-01-26 ENCOUNTER — ANTICOAGULATION THERAPY VISIT (OUTPATIENT)
Dept: ANTICOAGULATION | Facility: CLINIC | Age: 66
End: 2021-01-26

## 2021-01-26 DIAGNOSIS — I50.22 CHRONIC SYSTOLIC CONGESTIVE HEART FAILURE (H): ICD-10-CM

## 2021-01-26 DIAGNOSIS — Z95.811 LVAD (LEFT VENTRICULAR ASSIST DEVICE) PRESENT (H): ICD-10-CM

## 2021-01-26 DIAGNOSIS — I48.0 PAROXYSMAL ATRIAL FIBRILLATION (H): ICD-10-CM

## 2021-01-26 LAB — INR PPP: 2 (ref 0.9–1.1)

## 2021-01-26 NOTE — PROGRESS NOTES
ANTICOAGULATION FOLLOW-UP CLINIC VISIT    Patient Name:  Eliseo Tanner  Date:  2021  Contact Type:  Telephone    SUBJECTIVE:  Patient Findings         Clinical Outcomes     Negatives:  Major bleeding event, Thromboembolic event, Anticoagulation-related hospital admission, Anticoagulation-related ED visit, Anticoagulation-related fatality           OBJECTIVE    Recent labs: (last 7 days)     21   INR 2.0*       ASSESSMENT / PLAN  INR assessment THER    Recheck INR In: 1 WEEK    INR Location Outside lab      Anticoagulation Summary  As of 2021    INR goal:  2.0-3.0   TTR:  91.3 % (9.7 mo)   INR used for dosin.0 (2021)   Warfarin maintenance plan:  4 mg (4 mg x 1) every day   Full warfarin instructions:  4 mg every day   Weekly warfarin total:  28 mg   No change documented:  Gianna Allen RN   Plan last modified:  Krysta Mcdaniel RN (2020)   Next INR check:  2021   Priority:  Critical   Target end date:  Indefinite    Indications    LVAD (left ventricular assist device) present (H) [Z95.811]  Chronic systolic congestive heart failure (H) [I50.22]  Paroxysmal atrial fibrillation (H) [I48.0]             Anticoagulation Episode Summary     INR check location:      Preferred lab:      Send INR reminders to:  [facility]    Comments:  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  HM 3  placed 2020, 81mg ASA, Speak to spouse, Veronique at [phone]  2020:  Lab: [facility]:  ph: [phone] opt 5;   fax: [phone]      Anticoagulation Care Providers     Provider Role Specialty Phone number    Nader Cisneros MD Referring Cardiovascular Disease [phone]            See the Encounter Report to view Anticoagulation Flowsheet and Dosing Calendar (Go to Encounters tab in chart review, and find the Anticoagulation Therapy Visit)    Spoke with patient. Gave them their lab results and new warfarin  recommendation.  No changes in health, medication, or diet. No missed doses, no falls. No signs or symptoms of bleed or clotting.     Patient had LVAD placed on:   2/18/20  Type of LVAD: HM3  Patient's current Aspirin dose: 81mg  LVAD Protocol followed: yes    PACO MARQUEZ RN

## 2021-01-29 ENCOUNTER — VIRTUAL VISIT (OUTPATIENT)
Dept: NEPHROLOGY | Facility: CLINIC | Age: 66
End: 2021-01-29
Attending: INTERNAL MEDICINE
Payer: MEDICARE

## 2021-01-29 DIAGNOSIS — D50.0 IRON DEFICIENCY ANEMIA DUE TO CHRONIC BLOOD LOSS: ICD-10-CM

## 2021-01-29 DIAGNOSIS — I50.22 CHRONIC SYSTOLIC CONGESTIVE HEART FAILURE (H): ICD-10-CM

## 2021-01-29 DIAGNOSIS — Z95.811 LVAD (LEFT VENTRICULAR ASSIST DEVICE) PRESENT (H): ICD-10-CM

## 2021-01-29 DIAGNOSIS — M1A.30X0 CHRONIC GOUT DUE TO RENAL IMPAIRMENT WITHOUT TOPHUS, UNSPECIFIED SITE: ICD-10-CM

## 2021-01-29 DIAGNOSIS — I42.8 NONISCHEMIC CARDIOMYOPATHY (H): ICD-10-CM

## 2021-01-29 DIAGNOSIS — N18.32 STAGE 3B CHRONIC KIDNEY DISEASE (H): ICD-10-CM

## 2021-01-29 DIAGNOSIS — R79.89 ELEVATED SERUM CREATININE: Primary | ICD-10-CM

## 2021-01-29 DIAGNOSIS — I50.22 CHRONIC SYSTOLIC CONGESTIVE HEART FAILURE (H): Primary | ICD-10-CM

## 2021-01-29 PROCEDURE — 99213 OFFICE O/P EST LOW 20 MIN: CPT | Mod: 95 | Performed by: STUDENT IN AN ORGANIZED HEALTH CARE EDUCATION/TRAINING PROGRAM

## 2021-01-29 RX ORDER — BUMETANIDE 1 MG/1
1 TABLET ORAL DAILY
Qty: 90 TABLET | Refills: 3 | Status: ON HOLD | COMMUNITY
Start: 2021-01-29 | End: 2021-03-17

## 2021-01-29 NOTE — PROGRESS NOTES
USE SAIPENNY Gomes is a 65 year old who is being evaluated via a billable video visit.      How would you like to obtain your AVS? MyChart  If the video visit is dropped, the invitation should be resent by: Text to cell phone: [phone]  Will anyone else be joining your video visit? No        Video-Visit Details     Type of service:  Video Visit    Video Start Time: 11:07am    Video End Time:11:28 AM    Originating Location (pt. Location): Home    Distant Location (provider location):  [facility] [place]     Platform used for Video Visit: Cary Yoon MD  Nephrology

## 2021-01-29 NOTE — LETTER
1/29/2021       RE: Eliseo Tanner  [address] [place] [address]     Dear Colleague,    Thank you for referring your patient, Eliseo Tanner, to the [facility] [place] at [facility]. Please see a copy of my visit note below.    USE DOXIRMAITY    Eliseo is a 65 year old who is being evaluated via a billable video visit.      How would you like to obtain your AVS? MyChart  If the video visit is dropped, the invitation should be resent by: Text to cell phone: [phone]  Will anyone else be joining your video visit? No        Video-Visit Details     Type of service:  Video Visit    Video Start Time: 11:07am    Video End Time:11:28 AM    Originating Location (pt. Location): Home    Distant Location (provider location):  [facility] [place]     Platform used for Video Visit: Cary Yoon MD  Nephrology    Nephrology Clinic    Video Visit    SUBJECTIVE:   Eliseo Tanner is a 65 year old year old male with h/o NICM s/p LVAD placement in 2020, DM2, HTN, and MGUS here for: CKD follow-up    The patient returns after 3 months. Recently admitted twice, once for drive-line infection and another for over-diuresis. Creatinine has been up and down since he was last seen in renal clinic.     UA was obtained in November, showing no blood/RBCs or WBC and '30' of albumin.     Renal U/S showed 10.5cm right kidney and 11.6cm left kidney without structural issues.     No edema now. No SOB. Weighs himself daily.     He reports daily fatigue. Iron sats were low during his drive-line infxn, and he thinks he didn't receive iron (potentially due to the infxn).       Review of systems:  4 point ROS negative except as noted above.    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) (H)      LVAD (left ventricular assist device) present (H)              acetaminophen (TYLENOL) 325 MG tablet, Take 2 tablets (650 mg) by mouth every 4 hours as needed for mild pain or fever       allopurinol (ZYLOPRIM) 100 MG tablet, 2 tablets (200 mg) by Oral or Feeding Tube route daily       amiodarone (PACERONE) 200 MG tablet, Take 1 tablet (200 mg) by mouth daily       aspirin (ASA) 81 MG EC tablet, Take 1 tablet (81 mg) by mouth daily       atorvastatin (LIPITOR) 20 MG tablet, Take 20 mg by mouth daily       cephALEXin (KEFLEX) 500 MG capsule, Take 1 capsule (500 mg) by mouth 2 times daily       co-enzyme Q-10 200 MG CAPS, 200 mg by Oral or Feeding Tube route daily       finasteride (PROSCAR) 5 MG tablet, Take 1 tablet (5 mg) by mouth daily Helps with urinary retention.       FLUoxetine (PROZAC) 10 MG capsule, Take 30 mg by mouth daily       hydrALAZINE (APRESOLINE) 50 MG tablet, Take 2 tablets (100 mg) by mouth 3 times daily       insulin glargine (BASAGLAR KWIKPEN) 100 UNIT/ML pen, Inject 10 Units Subcutaneous At Bedtime        insulin pen needle (BD JAIME U/F) 32G X 4 MM miscellaneous,        lisinopril (ZESTRIL) 10 MG tablet, Take 1 tablet (10 mg) by mouth daily       magnesium oxide (MAG-OX) 400 MG tablet, 1 tablet (400 mg) by Oral or Feeding Tube route daily       multivitamin w/minerals (THERA-VIT-M) tablet, Take 1 tablet by mouth daily       nitroGLYcerin (NITROSTAT) 0.4 MG sublingual tablet, For chest pain place 1 tablet under the tongue every 5 minutes for 3 doses. If symptoms persist 5 minutes after 1st dose call 911.       omeprazole (PRILOSEC) 20 MG DR capsule, Take 20 mg by mouth daily       potassium chloride ER (KLOR-CON M) 20 MEQ CR tablet, Take 20 mEq by mouth daily       senna-docusate (SENOKOT-S/PERICOLACE) 8.6-50 MG tablet, Take 1 tablet by mouth 2 times daily as needed for constipation       tamsulosin (FLOMAX) 0.4 MG capsule, Take 1  capsule (0.4 mg) by mouth daily This med helps with urinary retention       warfarin ANTICOAGULANT (COUMADIN) 2 MG tablet, Take 4 mg by mouth daily        zolpidem (AMBIEN) 5 MG tablet, Take 5 mg by mouth nightly as needed for Insomnia    No current facility-administered medications on file prior to visit.        OBJECTIVE:  Vitals and exam deferred due to video visit.     Recent BMP and all creatinine values, CBC, urinalysis, ferritin and iron panel, LDH, and renal ultrasound reviewed.     Recent Labs   Lab Test 01/14/21 01/08/21  0556    137   POTASSIUM 4.7 4.3   CHLORIDE 100 107   CO2 25 22   ANIONGAP 10 8   * 114*   BUN 48 78*   CR 2.1 1.93*   CALOS 9.2 8.6       Lab Results   Component Value Date    WBC 7.8 01/05/2021     Lab Results   Component Value Date    RBC 5.07 01/05/2021     Lab Results   Component Value Date    HGB 10.8 01/05/2021     Lab Results   Component Value Date    HCT 36.7 01/05/2021     No components found for: MCT  Lab Results   Component Value Date    MCV 72 01/05/2021     Lab Results   Component Value Date    MCH 21.3 01/05/2021     Lab Results   Component Value Date    MCHC 29.4 01/05/2021     Lab Results   Component Value Date    RDW 24.6 01/05/2021     Lab Results   Component Value Date     01/05/2021       Color Urine (no units)   Date Value   11/17/2020 Yellow     Appearance Urine (no units)   Date Value   11/17/2020 Clear     Glucose Urine (mg/dL)   Date Value   11/17/2020 Negative     Bilirubin Urine (no units)   Date Value   11/17/2020 Negative     Ketones Urine (mg/dL)   Date Value   11/17/2020 Negative     Specific Gravity Urine (no units)   Date Value   11/17/2020 1.013     pH Urine (pH)   Date Value   11/17/2020 5.5     Protein Albumin Urine (mg/dL)   Date Value   11/17/2020 30 (A)     Nitrite Urine (no units)   Date Value   11/17/2020 Negative     Leukocyte Esterase Urine (no units)   Date Value   11/17/2020 Negative           ASSESSMENT and PLAN:   Eliseo was  seen today for video visit.    Diagnoses and all orders for this visit:    Elevated serum creatinine  The patient certainly has underlying CKD, though his progression of renal failure is of unclear etiology - potentially the natural history/progression of his CKD, which is often seen, but is a diagnosis of exclusion. He will obtain a UPCR to clarify his proteinuria-status, but his UA is reassuringly otherwise bland. Will also check monoclonal studies given his MGUS hx. It may be that his HF with LVAD / hemodynamics are leading to progression of CKD, though cardiology is carefully managing his volume status (his creatinine did improve somewhat with recent resolution of over-diuresis). It doesn't appear he has massive hemolysis going on (with resulting pigment-nephropathy), and the timing of his warfarin doesn't suggest a rare diagnosis of warfarin-nephropathy.   -     Protein  random urine with Creat Ratio; Future  -     Protein electrophoresis; Future  -     Protein Immunofixation Serum; Future  -     Kappa and lambda light chain; Future    Stage 3b chronic kidney disease  -     Parathyroid Hormone Intact; Future  -     Vitamin D Deficiency; Future  -     Basic metabolic panel **(2 WKS); Future  -     Albumin level; Future  -     Phosphorus; Future    LVAD (left ventricular assist device) present (H)  Chronic systolic congestive heart failure (H)  Diuretics managed by advanced HF team. On lisinopril 10mg daily.     Iron deficiency anemia due to chronic blood loss  Likely not yet treated due to recent infection (which can worsen with iron) - advised the patient to follow-up with his VAD coordinator now that he is off ABx to consider iron treatment.     Electrolytes, acid-base  No issues on last labs.     Chronic gout, due to renal failure  On allopurinol 200mg daily. Will need recheck of uric acid level in the future.       Return to clinic in 4 months.     Daniel Yoon MD  Instructor  Nephrology  Pager:  [phone]

## 2021-01-29 NOTE — PROGRESS NOTES
Called to check in on pt.   Pt reporting feeling better, strength improved, LVAD parameters stable.  Weight was up to 213 yesterday and pt took an additional 1 mg of Bumex which brought his weight back to 210 today and he feels his fluid volume is better.  He reported this was the first time he has needed additional diuretics since discharge.  Eliseo states that his driveline continues to drain, but is stable and [place] / ID aware and following.  Pt has appt with nephrology today and RTC 2/8.  Will follow up as needed.

## 2021-01-29 NOTE — PROGRESS NOTES
Nephrology Clinic    Video Visit    SUBJECTIVE:   Eliseo Tanner is a 65 year old year old male with h/o NICM s/p LVAD placement in 2020, DM2, HTN, and MGUS here for: CKD follow-up    The patient returns after 3 months. Recently admitted twice, once for drive-line infection and another for over-diuresis. Creatinine has been up and down since he was last seen in renal clinic.     UA was obtained in November, showing no blood/RBCs or WBC and '30' of albumin.     Renal U/S showed 10.5cm right kidney and 11.6cm left kidney without structural issues.     No edema now. No SOB. Weighs himself daily.     He reports daily fatigue. Iron sats were low during his drive-line infxn, and he thinks he didn't receive iron (potentially due to the infxn).       Review of systems:  4 point ROS negative except as noted above.    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)      LVAD (left ventricular assist device) present (H)              acetaminophen (TYLENOL) 325 MG tablet, Take 2 tablets (650 mg) by mouth every 4 hours as needed for mild pain or fever       allopurinol (ZYLOPRIM) 100 MG tablet, 2 tablets (200 mg) by Oral or Feeding Tube route daily       amiodarone (PACERONE) 200 MG tablet, Take 1 tablet (200 mg) by mouth daily       aspirin (ASA) 81 MG EC tablet, Take 1 tablet (81 mg) by mouth daily       atorvastatin (LIPITOR) 20 MG tablet, Take 20 mg by mouth daily       cephALEXin (KEFLEX) 500 MG capsule, Take 1 capsule (500 mg) by mouth 2 times daily       co-enzyme Q-10 200 MG CAPS, 200 mg by Oral or Feeding Tube route daily       finasteride (PROSCAR) 5 MG tablet, Take 1 tablet (5 mg) by mouth daily Helps with urinary retention.       FLUoxetine (PROZAC) 10 MG capsule, Take 30 mg by mouth daily       hydrALAZINE  (APRESOLINE) 50 MG tablet, Take 2 tablets (100 mg) by mouth 3 times daily       insulin glargine (BASAGLAR KWIKPEN) 100 UNIT/ML pen, Inject 10 Units Subcutaneous At Bedtime        insulin pen needle (BD JAIME U/F) 32G X 4 MM miscellaneous,        lisinopril (ZESTRIL) 10 MG tablet, Take 1 tablet (10 mg) by mouth daily       magnesium oxide (MAG-OX) 400 MG tablet, 1 tablet (400 mg) by Oral or Feeding Tube route daily       multivitamin w/minerals (THERA-VIT-M) tablet, Take 1 tablet by mouth daily       nitroGLYcerin (NITROSTAT) 0.4 MG sublingual tablet, For chest pain place 1 tablet under the tongue every 5 minutes for 3 doses. If symptoms persist 5 minutes after 1st dose call 911.       omeprazole (PRILOSEC) 20 MG DR capsule, Take 20 mg by mouth daily       potassium chloride ER (KLOR-CON M) 20 MEQ CR tablet, Take 20 mEq by mouth daily       senna-docusate (SENOKOT-S/PERICOLACE) 8.6-50 MG tablet, Take 1 tablet by mouth 2 times daily as needed for constipation       tamsulosin (FLOMAX) 0.4 MG capsule, Take 1 capsule (0.4 mg) by mouth daily This med helps with urinary retention       warfarin ANTICOAGULANT (COUMADIN) 2 MG tablet, Take 4 mg by mouth daily        zolpidem (AMBIEN) 5 MG tablet, Take 5 mg by mouth nightly as needed for Insomnia    No current facility-administered medications on file prior to visit.        OBJECTIVE:  Vitals and exam deferred due to video visit.     Recent BMP and all creatinine values, CBC, urinalysis, ferritin and iron panel, LDH, and renal ultrasound reviewed.     Recent Labs   Lab Test 01/14/21 01/08/21  0556    137   POTASSIUM 4.7 4.3   CHLORIDE 100 107   CO2 25 22   ANIONGAP 10 8   * 114*   BUN 48 78*   CR 2.1 1.93*   CALOS 9.2 8.6       Lab Results   Component Value Date    WBC 7.8 01/05/2021     Lab Results   Component Value Date    RBC 5.07 01/05/2021     Lab Results   Component Value Date    HGB 10.8 01/05/2021     Lab Results   Component Value Date    HCT 36.7  01/05/2021     No components found for: MCT  Lab Results   Component Value Date    MCV 72 01/05/2021     Lab Results   Component Value Date    MCH 21.3 01/05/2021     Lab Results   Component Value Date    MCHC 29.4 01/05/2021     Lab Results   Component Value Date    RDW 24.6 01/05/2021     Lab Results   Component Value Date     01/05/2021       Color Urine (no units)   Date Value   11/17/2020 Yellow     Appearance Urine (no units)   Date Value   11/17/2020 Clear     Glucose Urine (mg/dL)   Date Value   11/17/2020 Negative     Bilirubin Urine (no units)   Date Value   11/17/2020 Negative     Ketones Urine (mg/dL)   Date Value   11/17/2020 Negative     Specific Gravity Urine (no units)   Date Value   11/17/2020 1.013     pH Urine (pH)   Date Value   11/17/2020 5.5     Protein Albumin Urine (mg/dL)   Date Value   11/17/2020 30 (A)     Nitrite Urine (no units)   Date Value   11/17/2020 Negative     Leukocyte Esterase Urine (no units)   Date Value   11/17/2020 Negative           ASSESSMENT and PLAN:   Eliseo was seen today for video visit.    Diagnoses and all orders for this visit:    Elevated serum creatinine  The patient certainly has underlying CKD, though his progression of renal failure is of unclear etiology - potentially the natural history/progression of his CKD, which is often seen, but is a diagnosis of exclusion. He will obtain a UPCR to clarify his proteinuria-status, but his UA is reassuringly otherwise bland. Will also check monoclonal studies given his MGUS hx. It may be that his HF with LVAD / hemodynamics are leading to progression of CKD, though cardiology is carefully managing his volume status (his creatinine did improve somewhat with recent resolution of over-diuresis). It doesn't appear he has massive hemolysis going on (with resulting pigment-nephropathy), and the timing of his warfarin doesn't suggest a rare diagnosis of warfarin-nephropathy.   -     Protein  random urine with Creat Ratio;  Future  -     Protein electrophoresis; Future  -     Protein Immunofixation Serum; Future  -     Kappa and lambda light chain; Future    Stage 3b chronic kidney disease  -     Parathyroid Hormone Intact; Future  -     Vitamin D Deficiency; Future  -     Basic metabolic panel **(2 WKS); Future  -     Albumin level; Future  -     Phosphorus; Future    LVAD (left ventricular assist device) present (H)  Chronic systolic congestive heart failure (H)  Diuretics managed by advanced HF team. On lisinopril 10mg daily.     Iron deficiency anemia due to chronic blood loss  Likely not yet treated due to recent infection (which can worsen with iron) - advised the patient to follow-up with his VAD coordinator now that he is off ABx to consider iron treatment.     Electrolytes, acid-base  No issues on last labs.     Chronic gout, due to renal failure  On allopurinol 200mg daily. Will need recheck of uric acid level in the future.       Return to clinic in 4 months.     Daniel Yoon MD  Instructor  Nephrology  Pager: [phone]

## 2021-02-01 ENCOUNTER — TELEPHONE (OUTPATIENT)
Dept: NEPHROLOGY | Facility: CLINIC | Age: 66
End: 2021-02-01

## 2021-02-02 ENCOUNTER — ANTICOAGULATION THERAPY VISIT (OUTPATIENT)
Dept: ANTICOAGULATION | Facility: CLINIC | Age: 66
End: 2021-02-02

## 2021-02-02 ENCOUNTER — TRANSFERRED RECORDS (OUTPATIENT)
Dept: HEALTH INFORMATION MANAGEMENT | Facility: CLINIC | Age: 66
End: 2021-02-02

## 2021-02-02 DIAGNOSIS — Z95.811 LVAD (LEFT VENTRICULAR ASSIST DEVICE) PRESENT (H): ICD-10-CM

## 2021-02-02 DIAGNOSIS — I50.22 CHRONIC SYSTOLIC CONGESTIVE HEART FAILURE (H): ICD-10-CM

## 2021-02-02 DIAGNOSIS — I48.0 PAROXYSMAL ATRIAL FIBRILLATION (H): ICD-10-CM

## 2021-02-02 LAB — INR PPP: 2 (ref 0.9–1.1)

## 2021-02-02 NOTE — PROGRESS NOTES
ANTICOAGULATION FOLLOW-UP CLINIC VISIT    Patient Name:  Eliseo Tanner  Date:  2021  Contact Type:  Telephone    SUBJECTIVE:  Patient Findings     Comments:  Spoke with patient. Gave them their lab results and new warfarin recommendation.  No changes in health, medication, or diet. No missed doses, no falls. No signs or symptoms of bleed or clotting.         Clinical Outcomes     Negatives:  Major bleeding event, Thromboembolic event, Anticoagulation-related hospital admission, Anticoagulation-related ED visit, Anticoagulation-related fatality    Comments:  Spoke with patient. Gave them their lab results and new warfarin recommendation.  No changes in health, medication, or diet. No missed doses, no falls. No signs or symptoms of bleed or clotting.            OBJECTIVE    Recent labs: (last 7 days)     21   INR 2.0*       ASSESSMENT / PLAN  INR assessment THER    Recheck INR In: 1 WEEK    INR Location Clinic      Anticoagulation Summary  As of 2021    INR goal:  2.0-3.0   TTR:  91.5 % (9.9 mo)   INR used for dosin.0 (2021)   Warfarin maintenance plan:  4 mg (4 mg x 1) every day   Full warfarin instructions:  4 mg every day   Weekly warfarin total:  28 mg   No change documented:  Gianna Allen RN   Plan last modified:  Krysta Mcdaniel RN (2020)   Next INR check:  2021   Priority:  Critical   Target end date:  Indefinite    Indications    LVAD (left ventricular assist device) present (H) [Z95.811]  Chronic systolic congestive heart failure (H) [I50.22]  Paroxysmal atrial fibrillation (H) [I48.0]             Anticoagulation Episode Summary     INR check location:      Preferred lab:      Send INR reminders to:  [facility]    Comments:  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  HM 3  placed 2020, 81mg ASA, Speak to spouse, Veronique at [phone]  2020:  Lab: [facility]:  ph: [phone] opt 5;    fax: [phone]      Anticoagulation Care Providers     Provider Role Specialty Phone number    Nader Cisneros MD Referring Cardiovascular Disease [phone]            See the Encounter Report to view Anticoagulation Flowsheet and Dosing Calendar (Go to Encounters tab in chart review, and find the Anticoagulation Therapy Visit)    Spoke with patient. Gave them their lab results and new warfarin recommendation.  No changes in health, medication, or diet. No missed doses, no falls. No signs or symptoms of bleed or clotting.     Patient had LVAD placed on:   2/18/20  Type of LVAD: HM3  Patient's current Aspirin dose: 81mg  LVAD Protocol followed: yes     PACO MARQUEZ RN

## 2021-02-04 ENCOUNTER — VIRTUAL VISIT (OUTPATIENT)
Dept: INFECTIOUS DISEASES | Facility: CLINIC | Age: 66
End: 2021-02-04
Attending: INTERNAL MEDICINE
Payer: MEDICARE

## 2021-02-04 DIAGNOSIS — T82.7XXA INFECTION ASSOCIATED WITH DRIVELINE OF LEFT VENTRICULAR ASSIST DEVICE (LVAD) (H): Primary | ICD-10-CM

## 2021-02-04 PROCEDURE — 99441 PR PHYSICIAN TELEPHONE EVALUATION 5-10 MIN: CPT | Mod: 95 | Performed by: INTERNAL MEDICINE

## 2021-02-04 NOTE — PROGRESS NOTES
[facility]  General Infectious Disease Clinic Note:  Hospital follow up     Patient:  Eliseo Tanner, Date of birth 1955, Medical record number [id number]  Date of Visit:  02/04/2021         Assessment and Recommendations:   Problem List:  1. Complicated MSSA bacteremia and LVAD drive line infection  2. LVAD placement/Heart Failure    Discussion:  65 y/o male with chronic systolic HF and NICM s/p HM 3 on 2/18/2020 c/b polymicrobial bacteremia (P mirabilis and E faecalis), ICD placement, ABHINAV on CPAP, type II DM, CKD stage III and MSSA bacteremia 2/2 drive line infection (11/2020) s/p 6 weeks of cefazolin followed by cephalexin prophylaxis. Started on cephalexin post treatment. During treatment there was significant improvement in the appearance and drainage of the exit LVAD sites. Since starting the suppressive therapy he has continued to have dime sized yellow drainage. Drainage does not have an odor, amount is stable and exit site is without erythema (per patient report). No other systemic signs of infection and patient feels well. He has an appointment for a device check on 2/8 so will alert them and ask to comment on the appearance. Given stable and small amount of drainage will continue suppression for now. If there is increasing drainage can consider escalating antibiotic therapy to treatment.     Recommendations:  1. Continue cephalexin 500 mg po q 12 hours  2. Patient has a LVAD check on 2/8 so will ask that the coordinator comment on the drainage and appearance of the LVAD exit site    30 minutes spent on the date of the encounter doing chart review, patient visit, documentation and discussion with other provider(s)     Negar Bhatti DO    Infectious Diseases  Pager [phone]      History of the Infectious Disease lllness:   Last seen 12/11. Alerted by coordniator that patient was concerned about drainage ~ 2-3 weeks ago.  Describes yellow drainage from the LVAD  site that is dime size. About the size of a quarter. No odor noted. Denies redness around the exit site. Can be tender if the driveline is manipulated. Denies subjective fevers or chills. Feels the best he has in awhile.     Infectious Diseases History:  11/2020 developed pain around exit site followed by drainage~ 11/1. Initially started on doxycycline 11/5 but switched to tmp/smx when the culture revealed MSSA resistant to doxy-subsequently switched to tmp/smx 1 S.S. BID. Blood cultures were initially negative but 11/12 and 11/14 were positive for MSSA. Blood cultures cleared 11/15.  Repeat LVAD drainage cultures grew MSSA again. CT A/P was negative for a deeper drive line infection.  CHAMP negative for vegetations on valves or ICD leads.    Review of Systems:  CONSTITUTIONAL:  No fevers or chills. No night sweats.  RESPIRATORY:  no cough, no sputum or dyspnea  CARDIOVASCULAR:  negative for chest pain, heart palpitations  GASTROINTESTINAL:  negative for nausea, vomiting, diarrhea or constipation  HEME:  No easy bruising or bleeding  INTEGUMENT:  Drainage around LVAD site. See above.    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)      LVAD (left ventricular assist device) present (H)        Past Surgical History:   Procedure Laterality Date     ANESTHESIA CARDIOVERSION N/A 2/28/2020    Procedure: ANESTHESIA, FOR CARDIOVERSION;  Surgeon: GENERIC ANESTHESIA PROVIDER;  Location: [facility] OR     CV CENTRAL VENOUS CATHETER PLACEMENT N/A 2/13/2020    Procedure: Central Venous Catheter Placement;  Surgeon: Chente Moss MD;  Location: [facility] CARDIAC CATH LAB     CV INTRA-AORTIC BALLOON PUMP INSERTION N/A 2/7/2020    Procedure: Intra-Aortic Balloon Pump Insertion;  Surgeon: Jose Baldwin MD;  Location: [facility]  CARDIAC CATH LAB     CV INTRA-AORTIC BALLOON PUMP INSERTION N/A 2020    Procedure: Intra-Aortic Balloon Pump Insertion;  Surgeon: Chente Moss MD;  Location:  HEART CARDIAC CATH LAB     CV RIGHT HEART CATH MEASUREMENTS RECORDED N/A 2020    Procedure: CV RIGHT HEART CATH;  Surgeon: Dalton Baeza MD;  Location:  HEART CARDIAC CATH LAB     CV RIGHT HEART CATH MEASUREMENTS RECORDED N/A 2021    Procedure: Right Heart Cath;  Surgeon: Chun Ball MD;  Location:  HEART CARDIAC CATH LAB     CV SWAN LUCIANA PROCEDURE N/A 2020    Procedure: Elsah Luciana Procedure;  Surgeon: Chente Moss MD;  Location:  HEART CARDIAC CATH LAB     EP ICD Bilateral 3/16/2020    Procedure: EP ICD;  Surgeon: Dali Day MD;  Location:  HEART CARDIAC CATH LAB     INSERT VENTRICULAR ASSIST DEVICE LEFT (HEARTMATE II) N/A 2020    Procedure: INSERTION, LEFT VENTRICULAR ASSIST DEVICE (HEARTMATE III);  Surgeon: Mac Jaramillo MD;  Location:  OR     THORACOSCOPY Right 3/6/2020    Procedure: RIGHT VIDEO-ASSISTED THORASCOPIC SURGERY, EVACUATION OF HEMOTHORAX, PLACEMENT OF CHEST TUBES;  Surgeon: William Gan MD;  Location:  OR       No family history on file.    Social History     Social History Narrative     Not on file     Social History     Tobacco Use     Smoking status: Former Smoker     Quit date: 1994     Years since quittin.8     Smokeless tobacco: Never Used   Substance Use Topics     Alcohol use: Not on file     Drug use: Not on file       Immunization History   Administered Date(s) Administered     Flu, Unspecified 11/15/2019     Influenza (High Dose) 3 valent vaccine 10/25/2019     Pneumo Conj 13-V (2010&after) 2018       Patient Active Problem List   Diagnosis     Acute on chronic systolic congestive heart failure (H)     Anxiety     CKD (chronic kidney disease) stage 3, GFR 30-59 ml/min     Coronary artery disease involving native  coronary artery of native heart without angina pectoris     GERD (gastroesophageal reflux disease)     Gout     Hyperlipidemia with target LDL less than 100     Nonischemic cardiomyopathy (H)     Non-nephrotic range proteinuria     ABHINAV on CPAP     Type 2 diabetes mellitus with stage 3 chronic kidney disease, without long-term current use of insulin (H)     Heart failure (H)     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)     Cardiac arrest (H)     LVAD (left ventricular assist device) present (H)     Chronic systolic congestive heart failure (H)     CKD (chronic kidney disease) stage 4, GFR 15-29 ml/min (H)     Bacteremia     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       Allergies   Allergen Reactions     Heparin      HIT screen positive 2/14/20, reflex DAVINA negative; however heme recommended treating as if positive  HIT screen negative 2/11/20     Oxycodone Itching and Other (See Comments)     Chlorhexidine Rash              Physical Exam:   Vitals were reviewed.  All vitals stable  There were no vitals taken for this visit.  Wt Readings from Last 4 Encounters:   01/08/21 94.6 kg (208 lb 8 oz)   12/17/20 96.2 kg (212 lb)   11/18/20 95.3 kg (210 lb 1.6 oz)   11/12/20 98.9 kg (218 lb)       Exam:on the phone-appropriate, no evidence of acute distress.         Laboratory Data:   Inflammatory Markers    Recent Labs   Lab Test 12/17/20  0756 12/14/20  0815 11/05/20 09/21/20  1438 03/04/20  0757 02/08/20  0408   CRP 8.0 6.1 54.9 6.6 77.0* 21.0*     Metabolic Studies    Recent Labs   Lab Test 01/14/21 01/08/21  0556 01/07/21  0531  09/21/20  1440 09/21/20  1440 03/06/20  0322 03/06/20  0322 02/13/20  0206 02/13/20  0206 02/08/20  0408 02/08/20  0408    137 135   < >  --    < > 135   < > 132*   < > 134   POTASSIUM 4.7 4.3 4.4   < >  --    < > 4.1   < > 4.1  4.2   < > 3.5   CHLORIDE 100 107 103   < >  --    < > 104   < > 96   < > 103   CO2 25 22 22   < >  --    < > 30   < > 22   < > 23   ANIONGAP 10 8 10   < >  --    < > 1*   < > 14   < > 8   BUN 48 78* 96*   < >  --    < > 16   < > 34*   < > 50*   CR 2.1 1.93* 2.54*   < >  --    < > 1.02   < > 1.58*   < > 2.81*   GFRESTIMATED 34 35* 25*   < >  --    < > 76   < > 45*   < > 22*   * 114* 120*   < >  --    < > 149*   < > 154*   < > 155*   CALOS 9.2 8.6 8.7   < >  --    < > 8.0*   < > 8.9   < > 8.2*   PHOS  --   --   --   --   --   --  3.8   < > 5.4*   < > 5.0*   MAG  --  2.5* 2.5*   < >  --    < > 2.2   < > 2.0   < >  --    URIC  --   --   --   --   --   --   --   --   --   --  7.5*   LACT  --   --   --   --  1.3   < >  --    < > 9.5*   < > 0.6*   CKT  --   --   --   --   --   --   --   --  39  --   --     < > = values in this interval not displayed.       Hepatic Studies    Recent Labs   Lab Test 01/06/21  0539 01/05/21  1419 12/17/20  0756 11/14/20 2041 11/14/20 2041 11/12/20   BILITOTAL  --   --  0.5  --  0.7 0.5   DBIL  --   --   --   --  0.4*  --    ALKPHOS  --   --  173*  --  184* 202.0*   PROTTOTAL  --   --  8.7  --  7.8 7.6   ALBUMIN  --   --  3.4  --  2.6* 3.7   AST  --   --  40  --  48* 65.0*   ALT  --   --  12  --  56 64*    265* 292*   < >  --   --     < > = values in this interval not displayed.     Hematology Studies     Recent Labs   Lab Test 01/05/21  1419 12/22/20  0817 12/17/20  0756 12/14/20  0815   WBC 7.8 6.7 7.1 6.5   ANEU 6.1  --  4.9  --    ALYM 0.7*  --  1.1  --    GIULIANO 0.7  --  0.8  --    AEOS 0.1  --  0.2  --    HGB 10.8* 10.0* 9.7* 9.6*   HCT 36.7* 33.1* 32.8* 32.3*    364 328 403*     Microbiology:  Last Culture results with specimen  source  Culture Micro   Date Value Ref Range Status   12/17/2020 No anaerobes isolated  Final   12/17/2020 Light growth  Staphylococcus aureus   (A)  Final   11/17/2020 No growth  Final   11/17/2020 No growth  Final   11/16/2020 No growth  Final   11/16/2020 No growth  Final   11/16/2020 Moderate growth  Staphylococcus aureus   (A)  Final   11/16/2020 Moderate growth  Strain 2  Staphylococcus aureus   (A)  Final   11/15/2020 No growth  Final   11/15/2020 No growth  Final   11/14/2020 (A)  Final    Cultured on the 1st day of incubation:  Staphylococcus aureus     11/14/2020   Final    Critical Value/Significant Value, preliminary result only, called to and read back by  SHA ACE RN [id number] 11.15.20 NDP     11/14/2020   Final    (Note)  POSITIVE for STAPHYLOCOCCUS AUREUS and NEGATIVE for the mecA gene  (not MRSA) by [employer] multiplex nucleic acid test. The mecA gene was  not detected. Final identification and antimicrobial susceptibility  testing will be verified by standard methods.    Specimen tested with Verigene multiplex, gram-positive blood culture  nucleic acid test for the following targets: Staph aureus, Staph  epidermidis, Staph lugdunensis, other Staph species, Enterococcus  faecalis, Enterococcus faecium, Streptococcus species, S. agalactiae,  S. anginosus grp., S. pneumoniae, S. pyogenes, Listeria sp., mecA  (methicillin resistance) and Ernst/B (vancomycin resistance).    Critical Value/Significant Value called to and read back by Sha Ace Rn [id number] 11.15.20 NDP     11/14/2020 (A)  Final    Cultured on the 1st day of incubation:  Staphylococcus aureus  Susceptibility testing done on previous specimen     11/14/2020   Final    Critical Value/Significant Value, preliminary result only, called to and read back by  PATRICIO SHANNON RN [id number] 11.15.20 NDP     09/21/2020 No growth  Final   09/21/2020 No growth  Final   03/13/2020 No growth  Final   03/13/2020 No growth  Final   03/03/2020 No growth  Final    03/03/2020 No growth  Final   02/22/2020 No growth  Final   02/22/2020 No growth  Final   02/16/2020 No growth  Final   02/16/2020 No growth  Final   02/15/2020 No growth  Final   02/15/2020 (A)  Final    Cultured on the 2nd day of incubation:  Staphylococcus epidermidis     02/15/2020   Final    Critical Value/Significant Value, preliminary result only, called to and read back by  Cee Benavidez RN on 2.16.20 at 2220.  JRT     02/15/2020   Final    (Note)  POSITIVE for STAPHYLOCOCCUS EPIDERMIDIS and POSITIVE for the mecA  gene (resistant to methicillin) by [employer] multiplex nucleic acid  test. Final identification and antimicrobial susceptibility testing  will be verified by standard methods.    Specimen tested with [employer] multiplex, gram-positive blood culture  nucleic acid test for the following targets: Staph aureus, Staph  epidermidis, Staph lugdunensis, other Staph species, Enterococcus  faecalis, Enterococcus faecium, Streptococcus species, S. agalactiae,  S. anginosus grp., S. pneumoniae, S. pyogenes, Listeria sp., mecA  (methicillin resistance) and Ernst/B (vancomycin resistance).    Critical Value/Significant Value called to and read back by Cee Benavidez RN, 2.17.20 @ 0207 pt.     02/14/2020 No growth  Final   02/13/2020 (A)  Final    Cultured on the 1st day of incubation:  Proteus mirabilis  Susceptibility testing done on previous specimen     02/13/2020   Final    Critical Value/Significant Value, preliminary result only, called to and read back by  Mima Cabrera RN. @1426. 2.13.20. .      02/13/2020 (A)  Final    Cultured on the 1st day of incubation:  Enterococcus faecalis  Susceptibility testing done on previous specimen     02/13/2020   Final    Critical Value/Significant Value, preliminary result only, called to and read back by  Alisson Castillo RN on 2.13.20 at 1728.  JRT     02/13/2020   Final    (Note)  POSITIVE for ENTEROCOCCUS FAECALIS and NEGATIVE for Ernst/vanB genes  by  Verigene multiplex nucleic acid test. Final identification and  antimicrobial susceptibility testing will be verified by standard  methods.    Specimen tested with Verigene multiplex, gram-positive blood culture  nucleic acid test for the following targets: Staph aureus, Staph  epidermidis, Staph lugdunensis, other Staph species, Enterococcus  faecalis, Enterococcus faecium, Streptococcus species, S. agalactiae,  S. anginosus grp., S. pneumoniae, S. pyogenes, Listeria sp., mecA  (methicillin resistance) and Ernst/B (vancomycin resistance).    Critical Value/Significant Value called to and read back by MARIA EUGENIA MILLAN RN 2/13/20 2036          02/13/2020 (A)  Final    Cultured on the 1st day of incubation:  Proteus mirabilis     02/13/2020   Final    Critical Value/Significant Value, preliminary result only, called to and read back by  Mima Cabrera RN. @1426. 2.13.20. BS.      02/13/2020 (A)  Final    Cultured on the 1st day of incubation:  Enterococcus faecalis     02/13/2020   Final    Critical Value/Significant Value, preliminary result only, called to and read back by  Alisson Leon RN on 2.13.20 at 1728.  JRT     02/13/2020   Final    (Note)  POSITIVE for PROTEUS SPECIES by Verigene multiplex nucleic acid test.  Final identification and antimicrobial susceptibility testing will be  verified by standard methods.    Specimen tested with Verigene multiplex, gram-negative blood culture  nucleic acid test for the following targets: Acinetobacter sp.,  Citrobacter sp., Enterobacter sp., Proteus sp., E. coli, K.  pneumoniae/oxytoca, P. aeruginosa, and the following resistance  markers: CTXM, KPC, NDM, VIM, IMP and OXA.    Critical Value/Significant Value called to and read back by  Alisson Leon RN @ 1650. 2/13/20. AV      Specimen Description   Date Value Ref Range Status   12/17/2020 Wound DRIVELINE SITE  Final   12/17/2020 Wound DRIVELINE SITE  Final   12/17/2020 Wound DRIVELINE SITE  Final   11/17/2020  Blood Right Arm  Final   11/17/2020 Blood Left Arm  Final   11/16/2020 Blood Left Hand  Final   11/16/2020 Blood Right Arm  Final   11/16/2020 Wound Drainage  Final   11/16/2020 Wound Drainage  Final   11/15/2020 Blood Right Hand  Final   11/15/2020 Blood Left Arm  Final   11/15/2020 Feces  Final   11/14/2020 Blood Left Hand  Final   11/14/2020 Blood Left Arm  Final   09/21/2020 Blood Left Arm  Final   09/21/2020 Blood Right Arm  Final   03/13/2020 Blood Left Hand  Final   03/13/2020 Blood Right Hand  Final   03/03/2020 Blood Left Hand  Final   03/03/2020 Blood Right Arm  Final   02/22/2020 Blood Right Hand  Final   02/22/2020 Blood Right Hand  Final   02/19/2020 Nares  Final   02/16/2020 Blood Right Hand  Final   02/16/2020 Blood Left Hand  Final   02/15/2020 Blood Right Hand  Final   02/15/2020 Blood Left Hand  Final   02/14/2020 Blood Right Hand  Final   02/13/2020 Blood Left Hand  Final   02/13/2020 Blood Right Hand  Final   02/13/2020 Nasopharyngeal  Final   02/13/2020 Catheterized Urine  Final        Last check of C difficile  C Diff Toxin B PCR   Date Value Ref Range Status   11/15/2020 Negative NEG^Negative Final     Comment:     Negative: C. difficile target DNA sequences NOT detected, presumed negative   for C.difficile toxin B or the number of bacteria present may be below the   limit of detection for the test.  FDA approved assay performed using [employer] GeneXpert real-time PCR.  A negative result does not exclude actual disease due to C. difficile and may   be due to improper collection, handling and storage of the specimen or the   number of organisms in the specimen is below the detection limit of the assay.         Virology:  Hepatitis B Testing     Recent Labs   Lab Test 02/12/20  0440 02/08/20  0408   AUSAB 0.69 1.99   HBCAB Nonreactive Nonreactive   HEPBANG Nonreactive Nonreactive     Hepatitis C Antibody   Date Value Ref Range Status   02/12/2020 Nonreactive NR^Nonreactive Final     Comment:      Assay performance characteristics have not been established for newborns,   infants, and children     02/08/2020 Nonreactive NR^Nonreactive Final     Comment:     Assay performance characteristics have not been established for newborns,   infants, and children         CMV Antibody IgG   Date Value Ref Range Status   02/12/2020 >8.0 (H) 0.0 - 0.8 AI Final     Comment:     Positive  Antibody index (AI) values reflect qualitative changes in antibody   concentration that cannot be directly associated with clinical condition or   disease state.     02/08/2020 7.8 (H) 0.0 - 0.8 AI Final     Comment:     Positive  Antibody index (AI) values reflect qualitative changes in antibody   concentration that cannot be directly associated with clinical condition or   disease state.       Varicella Zoster Virus Antibody IgG   Date Value Ref Range Status   02/12/2020 2.0 (H) 0.0 - 0.8 AI Final     Comment:     Positive, suggests prev. exposure and probable immunity  Antibody index (AI) values reflect qualitative changes in antibody   concentration that cannot be directly associated with clinical condition or   disease state.     02/08/2020 2.4 (H) 0.0 - 0.8 AI Final     Comment:     Positive, suggests prev. exposure and probable immunity  Antibody index (AI) values reflect qualitative changes in antibody   concentration that cannot be directly associated with clinical condition or   disease state.       Toxoplasma Antibody IgG   Date Value Ref Range Status   02/12/2020 <3.0 0.0 - 7.1 IU/mL Final     Comment:     Negative- Absence of detectable Toxoplasma gondii IgG antibodies. A negative   result does not rule out acute infection.  The magnitude of the measured result is not indicative of the amount of   antibody present. The concentrations of anti-Toxoplasma gondii IgG in a given   specimen determined with assays from different manufacturers can vary due to   differences in assay methods and reagent specificity.     02/08/2020 <3.0  0.0 - 7.1 IU/mL Final     Comment:     Negative- Absence of detectable Toxoplasma gondii IgG antibodies. A negative   result does not rule out acute infection.  The magnitude of the measured result is not indicative of the amount of   antibody present. The concentrations of anti-Toxoplasma gondii IgG in a given   specimen determined with assays from different manufacturers can vary due to   differences in assay methods and reagent specificity.       Imaging:  Results for orders placed or performed during the hospital encounter of 11/14/20   US Abdomen Complete    Narrative    EXAMINATION: US ABDOMEN COMPLETE, 11/15/2020 9:20 AM     COMPARISON: 2/8/2020    HISTORY: Concern for abscess, patient unable to get CT scan due to  elevated creatinine    TECHNIQUE: The abdomen was scanned in standard fashion with  specialized ultrasound transducer(s) using both gray-scale and limited  color Doppler techniques.    FINDINGS:    Liver: The liver demonstrates normal homogeneous echotexture. The  liver measures 19.7 cm in craniocaudal dimension. No evidence of a  focal hepatic mass. The main portal vein is patent with antegrade  flow. Gallbladder: There is no wall thickening, positive sonographic  Yoon's sign or evidence for cholelithiasis.    Bile Ducts: Both the intra- and extrahepatic biliary system are of  normal caliber. The common bile duct measures 3.7 mm in diameter.    Pancreas: Not well visualized.    Kidneys: Both kidneys are of normal echotexture, without mass or  hydronephrosis. The craniocaudal dimensions are: right- 10.5, left-  11.5.    Spleen: The spleen is normal in size, measuring 11.1 in sagittal  dimension.    Aorta and IVC: The visualized portions of the aorta and IVC are  unremarkable. The proximal aorta measures 2.7 cm in diameter and the  IVC measures 2.4 cm in diameter.    Fluid: Small volume of ascites. Partially imaged right pleural  effusion.        Impression    IMPRESSION:   1. Hepatomegaly with  small volume ascites. No intra-abdominal abscess  visualized. If there is continued clinical concern for abscess,  consider noncontrast CT scan of the abdomen/pelvis.  2. Partially imaged right pleural effusion, likely trace.    I have personally reviewed the examination and initial interpretation  and I agree with the findings.    DEION FLANAGAN MD   CT Abdomen Pelvis w/o Contrast    Narrative    EXAMINATION: CT ABDOMEN PELVIS W/O CONTRAST, 11/15/2020 2:53 PM    TECHNIQUE:  Helical CT images from the lung bases through the pubic  symphysis were obtained  without IV contrast.     COMPARISON: Same-day ultrasound, CT 2/8/2020    HISTORY: Abd pain, fever, abscess suspected; Patient with VAD and  driveline infection (S. aureus infection/MSSA) and now complicated  with bacteremia, please evaluate for abscesses/intraabdominal  infectious process/cutaneous-soft tissues skin infections    FINDINGS:    Abdomen and pelvis:     Homogenous hepatic parenchyma, without focal liver lesion. No  calcified gallstones. No intra or extrahepatic biliary dilatation.  Moderate fatty replacement of the pancreatic parenchyma, without focal  pancreatic lesion or ductal dilatation. Unremarkable spleen. Mild  thickening of the adrenal glands bilaterally, without focal nodule,  similar to prior. Unremarkable noncontrast appearance of the kidneys,  without hydronephrosis or nephrolithiasis. The urinary bladder is  distended and otherwise unremarkable. Unremarkable prostate and  seminal vesicles.    No abnormally dilated loop of small or large bowel. Small to moderate  volume simple ascites within the abdomen and pelvis, slightly  increased in comparison to CT performed in February. No defined fluid  collection is noted to suggest abscess. No intraperitoneal free air is  noted. Small fat-containing umbilical and left inguinal hernias.  Vascular patency cannot be assessed on these noncontrast images,  however there is no evidence of infrarenal  abdominal aortic aneurysm.  No pathologically enlarged thoracic lymph nodes.    Lung bases:  Partially visualized postsurgical changes of LVAD  placement. Patency cannot be assessed on these noncontrast images. No  air or fluid collection along the drive line. Trace bilateral pleural  effusions and atelectatic changes, otherwise the lung bases are clear.  Stable cardiomegaly. Small sliding type hiatal hernia.    Bones and soft tissues: No acute or aggressive appearing osseous  lesion. Stepwise grade 1 anterolisthesis of L3 on L4, and L4 on L5,  with associated loss of intervertebral disc space at each level.  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 Mild diffuse body  wall edema.      Impression    IMPRESSION:   1. Small to moderate volume simple intra-abdominal ascites, slightly  increased in volume as compared to CT performed on 2/8/2020,  compatible with third spacing of fluid. No defined fluid collection is  identified to suggest abscess. No additional acute findings within the  abdomen/pelvis.  2. Trace bilateral pleural effusions, and associated bibasilar  atelectasis. The lung bases are otherwise clear.  3. Postprocedural changes of LVAD placement. The components are  unremarkable on these noncontrast images, without associated air or  inflammatory stranding to suggest drive line infection.     I have personally reviewed the examination and initial interpretation  and I agree with the findings.    OSITO BRAVO MD   US Renal Complete    Narrative    EXAMINATION: US RENAL COMPLETE, 11/17/2020 1:43 PM     COMPARISON: None.    HISTORY: Rule out obstruction    TECHNIQUE: The kidneys and bladder were scanned in the standard  fashion with specialized ultrasound transducer(s) using both gray  scale and limited color/spectral Doppler techniques.    FINDINGS:    Right kidney: Measures 10.5 cm in length. Parenchyma is of normal  thickness and echogenicity. No focal mass. No hydronephrosis.    Left kidney:  Measures 11.6 cm in length. Parenchyma is of normal  thickness and echogenicity. No focal mass. No hydronephrosis.     Bladder: Decompressed with Guevara catheter.      Impression    IMPRESSION:  1.  Normal renal ultrasound study.    OSITO BRAVO MD   XR Chest Port 1 View    Narrative    Exam: XR CHEST PORT 1 VW, 2020 2:19 PM    Indication: PICC Placement    Comparison: 2020    Findings:   Right upper extremity PICC tip at the mid to low SVC. Left chest wall  single lead automatic implantable cardiac defibrillator. Stable LVAD.  Stable enlarged cardiac silhouette. The pulmonary vasculature is  within normal limits. No pleural effusion or pneumothorax. No focal  airspace opacity. Low lung volumes.      Impression    Impression: Right upper extremity PICC tip at the mid to low SVC.    CHARLES HERNANDEZ, DO   Echo Complete    Narrative    [id number]  [id number]  [id number]  [id number]^PHYLLIS NAIR^CONNIE^[facility],[place]  [facility]  [address] [place] [id number]     Name: WOLFGANG LEACH  MRN: [id number]  : 1955  Study Date: 2020 10:56 AM  Age: 65 yrs  Gender: Male  [place] [facility] [place]  Reason For Study: Endocarditis  Ordering Physician: CONNIE JIN  Performed By: Nelly Monte RDCS     BSA: 2.1 m2  Height: 67 in  Weight: 220 lb  HR: 118  BP: 97/67 mmHg  _____________________________________________________________________________  __        Procedure  LVAD Echocardiogram with two-dimensional, color and spectral Doppler  performed. Technically difficult study.Extremely poor acoustic windows.  Limited information was obtained during study.  _____________________________________________________________________________  __        Interpretation Summary  Technically difficult study.Extremely poor acoustic windows.  Limited information was obtained during study.  LVAD cannula was seen in the expected  anatomic position in the LV apex.  HM3 at 5500RPM.  LVIDd 48mm.  Septum probably normal.  Aortic valve cannot be assessed.  Flow velocities not obtained.  Dilation of the inferior vena cava is present with abnormal respiratory  variation in diameter.  No pericardial effusion is present.  _____________________________________________________________________________  __        Left Ventricle  The Ejection Fraction is estimated at <20%. LVAD cannula was seen in the  expected anatomic position in the LV apex. HM3 at 5500RPM.  LVIDd 48mm.  Septum probably normal.  Aortic valve cannot be assessed.  Flow velocities not obtained.     Right Ventricle  Right ventricular function cannot be assessed due to poor image quality.     Vessels  Dilation of the inferior vena cava is present with abnormal respiratory  variation in diameter.     Pericardium  No pericardial effusion is present.     _____________________________________________________________________________  __  MMode/2D Measurements & Calculations  IVSd: 0.92 cm  LVIDd: 4.8 cm  LVIDs: 4.6 cm  LVPWd: 0.88 cm     FS: 4.6 %  LV mass(C)d: 147.6 grams  LV mass(C)dI: 70.1 grams/m2  asc Aorta Diam: 3.5 cm  RWT: 0.37           _____________________________________________________________________________  __           Report approved by: Graciela Tomlinson 2020 11:39 AM      Transesophageal Echocardiogram    [facility]    [id number]  [id number]  [id number]  [id number]^GLORIA^ANIKA           [facility],[place]  Echocardiography Laboratory  [address] [place] [id number]        Name: WOLFGANG LEACH  MRN: [id number]  : 1955  Study Date: 2020 09:03 AM  Age: 65 yrs  Gender: Male  Patient Location: [facility] [place]  Reason For Study: Bacteremia  History: Sepsis  Ordering Physician: ANIKA CASTRO  Performed By: Maciel House     BSA: 2.1 m2  Height: 67 in  Weight: 210 lb  HR: 116  BP: 69/39 mmHg  Attestation  I was present during CHAMP probe  placement by the fellow, Maciel House. I  personally viewed the imaging and agree with the interpretation and report as  documented by the fellow.  _____________________________________________________________________________  __     Interpretation Summary  S/P HM 3 LVAD. LVAD inflow cannula with normal doppler.     No vegetations on valves or ICD wire.     _____________________________________________________________________________  __        Procedure  Transesophageal Echocardiogram with color and spectral Doppler performed.  Procedure location Echo Lab. The procedure was performed in the Echo Lab. CHAMP  Probe #58 was used during the procedure. Patient was sedated using Fentanyl  100 mcg. Patient was sedated using Versed 2 mg. The heart rate, respiratory  rate, oxygen saturations, blood pressure, and response to care were monitored  throughout the procedure with the assistance of the nurse. I determined this  patient to be an appropriate candidate for the planned sedation and procedure  and have reassessed the patient immediately prior to sedation and procedure.  Total sedation time: 34 minutes of continuous bedside 1:1 monitoring. The  Transducer was inserted without difficulty . The patient tolerated the  procedure well. Complications None. Informed consent for Transesophegeal echo  obtained. The patient's rhythm is paced. Good quality two-dimensional was  performed and interpreted. Good quality color and spectral Doppler were  performed and interpreted.     Left Ventricle  Mild left ventricular dilation is present. Severely (EF 10-20%) reduced left  ventricular function is present. LVAD cannula was seen in the expected  anatomic position in the LV apex. S/P HM 3 LVAD 5500 RPM. Left ventricular  wall thickness is normal.     Right Ventricle  Moderate right ventricular dilation is present. Global right ventricular  function is moderately reduced. A pacemaker lead is noted in the right  ventricle. No vegetations  seen on Single lead ICD wire.     Atria  The left atrial appendage is normal. It is free of spontaneous echo contrast  and thrombus. Severe biatrial enlargement is present. The atrial septum is  intact as assessed by color Doppler .        Mitral Valve  Mitral leaflet thickness is normal. Mild to moderate mitral insufficiency is  present.     Aortic Valve  The aortic valve is tricuspid. The aortic valve remains closed with every  beat. Mild aortic valve sclerosis is present. Mild aortic insufficiency is  present.     Tricuspid Valve  The tricuspid valve is normal. Mild to moderate tricuspid insufficiency is  present.     Pulmonic Valve  The pulmonic valve is normal. Trace pulmonic insufficiency is present.     Vessels  The thoracic aorta is normal. Ascending aorta 2.9 cm.        Pericardium  No pericardial effusion is present.     Compared to Previous Study  This study was compared with the study from 11/16/2020 . There has been no  change.     _____________________________________________________________________________  __                       Report approved by: Graciela uDbose 11/19/2020 11:27 AM           _____________________________________________________________________________  __

## 2021-02-04 NOTE — LETTER
2/4/2021       RE: Eliseo Tanner  [address] [place] [address]     Dear Colleague,    Thank you for referring your patient, Eliseo Tanner, to the [facility] INFECTIOUS DISEASE [facility] [place] [facility]. Please see a copy of my visit note below.    [facility]  General Infectious Disease Clinic Note:  Hospital follow up     Patient:  Eliseo Tanner, Date of birth 1955, Medical record number [id number]  Date of Visit:  02/04/2021         Assessment and Recommendations:   Problem List:  1. Complicated MSSA bacteremia and LVAD drive line infection  2. LVAD placement/Heart Failure    Discussion:  65 y/o male with chronic systolic HF and NICM s/p HM 3 on 2/18/2020 c/b polymicrobial bacteremia (P mirabilis and E faecalis), ICD placement, ABHINAV on CPAP, type II DM, CKD stage III and MSSA bacteremia 2/2 drive line infection (11/2020) s/p 6 weeks of cefazolin followed by cephalexin prophylaxis. Started on cephalexin post treatment. During treatment there was significant improvement in the appearance and drainage of the exit LVAD sites. Since starting the suppressive therapy he has continued to have dime sized yellow drainage. Drainage does not have an odor, amount is stable and exit site is without erythema (per patient report). No other systemic signs of infection and patient feels well. He has an appointment for a device check on 2/8 so will alert them and ask to comment on the appearance. Given stable and small amount of drainage will continue suppression for now. If there is increasing drainage can consider escalating antibiotic therapy to treatment.     Recommendations:  1. Continue cephalexin 500 mg po q 12 hours  2. Patient has a LVAD check on 2/8 so will ask that the coordinator comment on the drainage and appearance of the LVAD exit site    30 minutes spent on the date of the encounter doing chart review,  patient visit, documentation and discussion with other provider(s)     Negar Bhatti DO    Infectious Diseases  Pager [phone]      History of the Infectious Disease lllness:   Last seen 12/11. Alerted by coordniator that patient was concerned about drainage ~ 2-3 weeks ago.  Describes yellow drainage from the LVAD site that is dime size. About the size of a quarter. No odor noted. Denies redness around the exit site. Can be tender if the driveline is manipulated. Denies subjective fevers or chills. Feels the best he has in awhile.     Infectious Diseases History:  11/2020 developed pain around exit site followed by drainage~ 11/1. Initially started on doxycycline 11/5 but switched to tmp/smx when the culture revealed MSSA resistant to doxy-subsequently switched to tmp/smx 1 S.S. BID. Blood cultures were initially negative but 11/12 and 11/14 were positive for MSSA. Blood cultures cleared 11/15.  Repeat LVAD drainage cultures grew MSSA again. CT A/P was negative for a deeper drive line infection.  CHAMP negative for vegetations on valves or ICD leads.    Review of Systems:  CONSTITUTIONAL:  No fevers or chills. No night sweats.  RESPIRATORY:  no cough, no sputum or dyspnea  CARDIOVASCULAR:  negative for chest pain, heart palpitations  GASTROINTESTINAL:  negative for nausea, vomiting, diarrhea or constipation  HEME:  No easy bruising or bleeding  INTEGUMENT:  Drainage around LVAD site. See above.    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)      LVAD (left ventricular assist device) present (H)        Past Surgical History:   Procedure Laterality Date     ANESTHESIA CARDIOVERSION N/A 2/28/2020    Procedure: ANESTHESIA, FOR CARDIOVERSION;  Surgeon: GENERIC ANESTHESIA PROVIDER;  Location: [facility] OR      CENTRAL  VENOUS CATHETER PLACEMENT N/A 2020    Procedure: Central Venous Catheter Placement;  Surgeon: Chente Moss MD;  Location:  HEART CARDIAC CATH LAB     CV INTRA-AORTIC BALLOON PUMP INSERTION N/A 2020    Procedure: Intra-Aortic Balloon Pump Insertion;  Surgeon: Jose Baldwin MD;  Location:  HEART CARDIAC CATH LAB     CV INTRA-AORTIC BALLOON PUMP INSERTION N/A 2020    Procedure: Intra-Aortic Balloon Pump Insertion;  Surgeon: Chente Moss MD;  Location:  HEART CARDIAC CATH LAB     CV RIGHT HEART CATH MEASUREMENTS RECORDED N/A 2020    Procedure: CV RIGHT HEART CATH;  Surgeon: Dalton Baeza MD;  Location:  HEART CARDIAC CATH LAB     CV RIGHT HEART CATH MEASUREMENTS RECORDED N/A 2021    Procedure: Right Heart Cath;  Surgeon: Chun Ball MD;  Location:  HEART CARDIAC CATH LAB     CV SWAN LUCIANA PROCEDURE N/A 2020    Procedure: Dolph Luciana Procedure;  Surgeon: Chente Moss MD;  Location:  HEART CARDIAC CATH LAB     EP ICD Bilateral 3/16/2020    Procedure: EP ICD;  Surgeon: Dali Day MD;  Location:  HEART CARDIAC CATH LAB     INSERT VENTRICULAR ASSIST DEVICE LEFT (HEARTMATE II) N/A 2020    Procedure: INSERTION, LEFT VENTRICULAR ASSIST DEVICE (HEARTMATE III);  Surgeon: Mac Jaramillo MD;  Location:  OR     THORACOSCOPY Right 3/6/2020    Procedure: RIGHT VIDEO-ASSISTED THORASCOPIC SURGERY, EVACUATION OF HEMOTHORAX, PLACEMENT OF CHEST TUBES;  Surgeon: William Gan MD;  Location:  OR       No family history on file.    Social History     Social History Narrative     Not on file     Social History     Tobacco Use     Smoking status: Former Smoker     Quit date: 1994     Years since quittin.8     Smokeless tobacco: Never Used   Substance Use Topics     Alcohol use: Not on file     Drug use: Not on file       Immunization History   Administered Date(s) Administered     Flu, Unspecified  11/15/2019     Influenza (High Dose) 3 valent vaccine 10/25/2019     Pneumo Conj 13-V (2010&after) 09/06/2018       Patient Active Problem List   Diagnosis     Acute on chronic systolic congestive heart failure (H)     Anxiety     CKD (chronic kidney disease) stage 3, GFR 30-59 ml/min     Coronary artery disease involving native coronary artery of native heart without angina pectoris     GERD (gastroesophageal reflux disease)     Gout     Hyperlipidemia with target LDL less than 100     Nonischemic cardiomyopathy (H)     Non-nephrotic range proteinuria     ABHINAV on CPAP     Type 2 diabetes mellitus with stage 3 chronic kidney disease, without long-term current use of insulin (H)     Heart failure (H)     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)     Cardiac arrest (H)     LVAD (left ventricular assist device) present (H)     Chronic systolic congestive heart failure (H)     CKD (chronic kidney disease) stage 4, GFR 15-29 ml/min (H)     Bacteremia     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       Allergies   Allergen Reactions     Heparin      HIT screen positive 2/14/20, reflex DAVINA negative; however heme recommended treating as if positive  HIT screen negative 2/11/20     Oxycodone Itching and Other (See Comments)     Chlorhexidine Rash              Physical Exam:   Vitals were reviewed.  All vitals stable  There were no vitals taken for this visit.  Wt Readings from Last 4 Encounters:   01/08/21 94.6 kg (208 lb 8 oz)   12/17/20 96.2 kg (212 lb)   11/18/20 95.3 kg (210 lb 1.6 oz)   11/12/20 98.9 kg (218 lb)       Exam:on the  phone-appropriate, no evidence of acute distress.         Laboratory Data:   Inflammatory Markers    Recent Labs   Lab Test 12/17/20  0756 12/14/20  0815 11/05/20 09/21/20  1438 03/04/20  0757 02/08/20  0408   CRP 8.0 6.1 54.9 6.6 77.0* 21.0*     Metabolic Studies    Recent Labs   Lab Test 01/14/21 01/08/21  0556 01/07/21  0531 09/21/20  1440 09/21/20  1440 03/06/20  0322 03/06/20  0322 02/13/20  0206 02/13/20  0206 02/08/20  0408 02/08/20  0408    137 135   < >  --    < > 135   < > 132*   < > 134   POTASSIUM 4.7 4.3 4.4   < >  --    < > 4.1   < > 4.1  4.2   < > 3.5   CHLORIDE 100 107 103   < >  --    < > 104   < > 96   < > 103   CO2 25 22 22   < >  --    < > 30   < > 22   < > 23   ANIONGAP 10 8 10   < >  --    < > 1*   < > 14   < > 8   BUN 48 78* 96*   < >  --    < > 16   < > 34*   < > 50*   CR 2.1 1.93* 2.54*   < >  --    < > 1.02   < > 1.58*   < > 2.81*   GFRESTIMATED 34 35* 25*   < >  --    < > 76   < > 45*   < > 22*   * 114* 120*   < >  --    < > 149*   < > 154*   < > 155*   CALOS 9.2 8.6 8.7   < >  --    < > 8.0*   < > 8.9   < > 8.2*   PHOS  --   --   --   --   --   --  3.8   < > 5.4*   < > 5.0*   MAG  --  2.5* 2.5*   < >  --    < > 2.2   < > 2.0   < >  --    URIC  --   --   --   --   --   --   --   --   --   --  7.5*   LACT  --   --   --   --  1.3   < >  --    < > 9.5*   < > 0.6*   CKT  --   --   --   --   --   --   --   --  39  --   --     < > = values in this interval not displayed.       Hepatic Studies    Recent Labs   Lab Test 01/06/21  0539 01/05/21  1419 12/17/20  0756 11/14/20 2041 11/14/20 2041 11/12/20   BILITOTAL  --   --  0.5  --  0.7 0.5   DBIL  --   --   --   --  0.4*  --    ALKPHOS  --   --  173*  --  184* 202.0*   PROTTOTAL  --   --  8.7  --  7.8 7.6   ALBUMIN  --   --  3.4  --  2.6* 3.7   AST  --   --  40  --  48* 65.0*   ALT  --   --  12  --  56 64*    265* 292*   < >  --   --     < > = values in this interval not displayed.     Hematology Studies     Recent Labs   Lab  Test 01/05/21  1419 12/22/20  0817 12/17/20  0756 12/14/20  0815   WBC 7.8 6.7 7.1 6.5   ANEU 6.1  --  4.9  --    ALYM 0.7*  --  1.1  --    GIULIANO 0.7  --  0.8  --    AEOS 0.1  --  0.2  --    HGB 10.8* 10.0* 9.7* 9.6*   HCT 36.7* 33.1* 32.8* 32.3*    364 328 403*     Microbiology:  Last Culture results with specimen source  Culture Micro   Date Value Ref Range Status   12/17/2020 No anaerobes isolated  Final   12/17/2020 Light growth  Staphylococcus aureus   (A)  Final   11/17/2020 No growth  Final   11/17/2020 No growth  Final   11/16/2020 No growth  Final   11/16/2020 No growth  Final   11/16/2020 Moderate growth  Staphylococcus aureus   (A)  Final   11/16/2020 Moderate growth  Strain 2  Staphylococcus aureus   (A)  Final   11/15/2020 No growth  Final   11/15/2020 No growth  Final   11/14/2020 (A)  Final    Cultured on the 1st day of incubation:  Staphylococcus aureus     11/14/2020   Final    Critical Value/Significant Value, preliminary result only, called to and read back by  SHA ACE RN [id number] 11.15.20 NDP     11/14/2020   Final    (Note)  POSITIVE for STAPHYLOCOCCUS AUREUS and NEGATIVE for the mecA gene  (not MRSA) by [employer]igene multiplex nucleic acid test. The mecA gene was  not detected. Final identification and antimicrobial susceptibility  testing will be verified by standard methods.    Specimen tested with Verigene multiplex, gram-positive blood culture  nucleic acid test for the following targets: Staph aureus, Staph  epidermidis, Staph lugdunensis, other Staph species, Enterococcus  faecalis, Enterococcus faecium, Streptococcus species, S. agalactiae,  S. anginosus grp., S. pneumoniae, S. pyogenes, Listeria sp., mecA  (methicillin resistance) and Ernst/B (vancomycin resistance).    Critical Value/Significant Value called to and read back by Sha Ace Rn [id number] 11.15.20 NDP     11/14/2020 (A)  Final    Cultured on the 1st day of incubation:  Staphylococcus aureus  Susceptibility testing done on  previous specimen     11/14/2020   Final    Critical Value/Significant Value, preliminary result only, called to and read back by  PATRICIO SHANNON RN 2101 11.15.20 NDP     09/21/2020 No growth  Final   09/21/2020 No growth  Final   03/13/2020 No growth  Final   03/13/2020 No growth  Final   03/03/2020 No growth  Final   03/03/2020 No growth  Final   02/22/2020 No growth  Final   02/22/2020 No growth  Final   02/16/2020 No growth  Final   02/16/2020 No growth  Final   02/15/2020 No growth  Final   02/15/2020 (A)  Final    Cultured on the 2nd day of incubation:  Staphylococcus epidermidis     02/15/2020   Final    Critical Value/Significant Value, preliminary result only, called to and read back by  Cee Benavidez RN on 2.16.20 at 2220.  JRT     02/15/2020   Final    (Note)  POSITIVE for STAPHYLOCOCCUS EPIDERMIDIS and POSITIVE for the mecA  gene (resistant to methicillin) by [employer] multiplex nucleic acid  test. Final identification and antimicrobial susceptibility testing  will be verified by standard methods.    Specimen tested with Verigene multiplex, gram-positive blood culture  nucleic acid test for the following targets: Staph aureus, Staph  epidermidis, Staph lugdunensis, other Staph species, Enterococcus  faecalis, Enterococcus faecium, Streptococcus species, S. agalactiae,  S. anginosus grp., S. pneumoniae, S. pyogenes, Listeria sp., mecA  (methicillin resistance) and Ernst/B (vancomycin resistance).    Critical Value/Significant Value called to and read back by Cee Benavidez RN, 2.17.20 @ 0207 pt.     02/14/2020 No growth  Final   02/13/2020 (A)  Final    Cultured on the 1st day of incubation:  Proteus mirabilis  Susceptibility testing done on previous specimen     02/13/2020   Final    Critical Value/Significant Value, preliminary result only, called to and read back by  Mima Cabrera RN. @1426. 2.13.20. BS.      02/13/2020 (A)  Final    Cultured on the 1st day of incubation:  Enterococcus  faecalis  Susceptibility testing done on previous specimen     02/13/2020   Final    Critical Value/Significant Value, preliminary result only, called to and read back by  Alisson Castillo RN on 2.13.20 at 1728.  T     02/13/2020   Final    (Note)  POSITIVE for ENTEROCOCCUS FAECALIS and NEGATIVE for Ernst/vanB genes  by Verigene multiplex nucleic acid test. Final identification and  antimicrobial susceptibility testing will be verified by standard  methods.    Specimen tested with Verigene multiplex, gram-positive blood culture  nucleic acid test for the following targets: Staph aureus, Staph  epidermidis, Staph lugdunensis, other Staph species, Enterococcus  faecalis, Enterococcus faecium, Streptococcus species, S. agalactiae,  S. anginosus grp., S. pneumoniae, S. pyogenes, Listeria sp., mecA  (methicillin resistance) and Ernst/B (vancomycin resistance).    Critical Value/Significant Value called to and read back by MARIA EUGENIA MILLAN RN 2/13/20 2036          02/13/2020 (A)  Final    Cultured on the 1st day of incubation:  Proteus mirabilis     02/13/2020   Final    Critical Value/Significant Value, preliminary result only, called to and read back by  Mima Cabrera RN. @1426. 2.13.20. BS.      02/13/2020 (A)  Final    Cultured on the 1st day of incubation:  Enterococcus faecalis     02/13/2020   Final    Critical Value/Significant Value, preliminary result only, called to and read back by  Alisson Leon RN on 2.13.20 at 1728.  T     02/13/2020   Final    (Note)  POSITIVE for PROTEUS SPECIES by Verigene multiplex nucleic acid test.  Final identification and antimicrobial susceptibility testing will be  verified by standard methods.    Specimen tested with Verigene multiplex, gram-negative blood culture  nucleic acid test for the following targets: Acinetobacter sp.,  Citrobacter sp., Enterobacter sp., Proteus sp., E. coli, K.  pneumoniae/oxytoca, P. aeruginosa, and the following resistance  markers: CTXM,  KPC, NDM, VIM, IMP and OXA.    Critical Value/Significant Value called to and read back by  Alisson Leon RN @ 1650. 2/13/20. AV      Specimen Description   Date Value Ref Range Status   12/17/2020 Wound DRIVELINE SITE  Final   12/17/2020 Wound DRIVELINE SITE  Final   12/17/2020 Wound DRIVELINE SITE  Final   11/17/2020 Blood Right Arm  Final   11/17/2020 Blood Left Arm  Final   11/16/2020 Blood Left Hand  Final   11/16/2020 Blood Right Arm  Final   11/16/2020 Wound Drainage  Final   11/16/2020 Wound Drainage  Final   11/15/2020 Blood Right Hand  Final   11/15/2020 Blood Left Arm  Final   11/15/2020 Feces  Final   11/14/2020 Blood Left Hand  Final   11/14/2020 Blood Left Arm  Final   09/21/2020 Blood Left Arm  Final   09/21/2020 Blood Right Arm  Final   03/13/2020 Blood Left Hand  Final   03/13/2020 Blood Right Hand  Final   03/03/2020 Blood Left Hand  Final   03/03/2020 Blood Right Arm  Final   02/22/2020 Blood Right Hand  Final   02/22/2020 Blood Right Hand  Final   02/19/2020 Nares  Final   02/16/2020 Blood Right Hand  Final   02/16/2020 Blood Left Hand  Final   02/15/2020 Blood Right Hand  Final   02/15/2020 Blood Left Hand  Final   02/14/2020 Blood Right Hand  Final   02/13/2020 Blood Left Hand  Final   02/13/2020 Blood Right Hand  Final   02/13/2020 Nasopharyngeal  Final   02/13/2020 Catheterized Urine  Final        Last check of C difficile  C Diff Toxin B PCR   Date Value Ref Range Status   11/15/2020 Negative NEG^Negative Final     Comment:     Negative: C. difficile target DNA sequences NOT detected, presumed negative   for C.difficile toxin B or the number of bacteria present may be below the   limit of detection for the test.  FDA approved assay performed using [employer] GeneXpert real-time PCR.  A negative result does not exclude actual disease due to C. difficile and may   be due to improper collection, handling and storage of the specimen or the   number of organisms in the specimen is below the  detection limit of the assay.         Virology:  Hepatitis B Testing     Recent Labs   Lab Test 02/12/20  0440 02/08/20  0408   AUSAB 0.69 1.99   HBCAB Nonreactive Nonreactive   HEPBANG Nonreactive Nonreactive     Hepatitis C Antibody   Date Value Ref Range Status   02/12/2020 Nonreactive NR^Nonreactive Final     Comment:     Assay performance characteristics have not been established for newborns,   infants, and children     02/08/2020 Nonreactive NR^Nonreactive Final     Comment:     Assay performance characteristics have not been established for newborns,   infants, and children         CMV Antibody IgG   Date Value Ref Range Status   02/12/2020 >8.0 (H) 0.0 - 0.8 AI Final     Comment:     Positive  Antibody index (AI) values reflect qualitative changes in antibody   concentration that cannot be directly associated with clinical condition or   disease state.     02/08/2020 7.8 (H) 0.0 - 0.8 AI Final     Comment:     Positive  Antibody index (AI) values reflect qualitative changes in antibody   concentration that cannot be directly associated with clinical condition or   disease state.       Varicella Zoster Virus Antibody IgG   Date Value Ref Range Status   02/12/2020 2.0 (H) 0.0 - 0.8 AI Final     Comment:     Positive, suggests prev. exposure and probable immunity  Antibody index (AI) values reflect qualitative changes in antibody   concentration that cannot be directly associated with clinical condition or   disease state.     02/08/2020 2.4 (H) 0.0 - 0.8 AI Final     Comment:     Positive, suggests prev. exposure and probable immunity  Antibody index (AI) values reflect qualitative changes in antibody   concentration that cannot be directly associated with clinical condition or   disease state.       Toxoplasma Antibody IgG   Date Value Ref Range Status   02/12/2020 <3.0 0.0 - 7.1 IU/mL Final     Comment:     Negative- Absence of detectable Toxoplasma gondii IgG antibodies. A negative   result does not rule  out acute infection.  The magnitude of the measured result is not indicative of the amount of   antibody present. The concentrations of anti-Toxoplasma gondii IgG in a given   specimen determined with assays from different manufacturers can vary due to   differences in assay methods and reagent specificity.     02/08/2020 <3.0 0.0 - 7.1 IU/mL Final     Comment:     Negative- Absence of detectable Toxoplasma gondii IgG antibodies. A negative   result does not rule out acute infection.  The magnitude of the measured result is not indicative of the amount of   antibody present. The concentrations of anti-Toxoplasma gondii IgG in a given   specimen determined with assays from different manufacturers can vary due to   differences in assay methods and reagent specificity.       Imaging:  Results for orders placed or performed during the hospital encounter of 11/14/20   US Abdomen Complete    Narrative    EXAMINATION: US ABDOMEN COMPLETE, 11/15/2020 9:20 AM     COMPARISON: 2/8/2020    HISTORY: Concern for abscess, patient unable to get CT scan due to  elevated creatinine    TECHNIQUE: The abdomen was scanned in standard fashion with  specialized ultrasound transducer(s) using both gray-scale and limited  color Doppler techniques.    FINDINGS:    Liver: The liver demonstrates normal homogeneous echotexture. The  liver measures 19.7 cm in craniocaudal dimension. No evidence of a  focal hepatic mass. The main portal vein is patent with antegrade  flow. Gallbladder: There is no wall thickening, positive sonographic  Yoon's sign or evidence for cholelithiasis.    Bile Ducts: Both the intra- and extrahepatic biliary system are of  normal caliber. The common bile duct measures 3.7 mm in diameter.    Pancreas: Not well visualized.    Kidneys: Both kidneys are of normal echotexture, without mass or  hydronephrosis. The craniocaudal dimensions are: right- 10.5, left-  11.5.    Spleen: The spleen is normal in size, measuring 11.1  in sagittal  dimension.    Aorta and IVC: The visualized portions of the aorta and IVC are  unremarkable. The proximal aorta measures 2.7 cm in diameter and the  IVC measures 2.4 cm in diameter.    Fluid: Small volume of ascites. Partially imaged right pleural  effusion.        Impression    IMPRESSION:   1. Hepatomegaly with small volume ascites. No intra-abdominal abscess  visualized. If there is continued clinical concern for abscess,  consider noncontrast CT scan of the abdomen/pelvis.  2. Partially imaged right pleural effusion, likely trace.    I have personally reviewed the examination and initial interpretation  and I agree with the findings.    DEION FLANAGAN MD   CT Abdomen Pelvis w/o Contrast    Narrative    EXAMINATION: CT ABDOMEN PELVIS W/O CONTRAST, 11/15/2020 2:53 PM    TECHNIQUE:  Helical CT images from the lung bases through the pubic  symphysis were obtained  without IV contrast.     COMPARISON: Same-day ultrasound, CT 2/8/2020    HISTORY: Abd pain, fever, abscess suspected; Patient with VAD and  driveline infection (S. aureus infection/MSSA) and now complicated  with bacteremia, please evaluate for abscesses/intraabdominal  infectious process/cutaneous-soft tissues skin infections    FINDINGS:    Abdomen and pelvis:     Homogenous hepatic parenchyma, without focal liver lesion. No  calcified gallstones. No intra or extrahepatic biliary dilatation.  Moderate fatty replacement of the pancreatic parenchyma, without focal  pancreatic lesion or ductal dilatation. Unremarkable spleen. Mild  thickening of the adrenal glands bilaterally, without focal nodule,  similar to prior. Unremarkable noncontrast appearance of the kidneys,  without hydronephrosis or nephrolithiasis. The urinary bladder is  distended and otherwise unremarkable. Unremarkable prostate and  seminal vesicles.    No abnormally dilated loop of small or large bowel. Small to moderate  volume simple ascites within the abdomen and pelvis,  slightly  increased in comparison to CT performed in February. No defined fluid  collection is noted to suggest abscess. No intraperitoneal free air is  noted. Small fat-containing umbilical and left inguinal hernias.  Vascular patency cannot be assessed on these noncontrast images,  however there is no evidence of infrarenal abdominal aortic aneurysm.  No pathologically enlarged thoracic lymph nodes.    Lung bases:  Partially visualized postsurgical changes of LVAD  placement. Patency cannot be assessed on these noncontrast images. No  air or fluid collection along the drive line. Trace bilateral pleural  effusions and atelectatic changes, otherwise the lung bases are clear.  Stable cardiomegaly. Small sliding type hiatal hernia.    Bones and soft tissues: No acute or aggressive appearing osseous  lesion. Stepwise grade 1 anterolisthesis of L3 on L4, and L4 on L5,  with associated loss of intervertebral disc space at each level.  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 Mild diffuse body  wall edema.      Impression    IMPRESSION:   1. Small to moderate volume simple intra-abdominal ascites, slightly  increased in volume as compared to CT performed on 2/8/2020,  compatible with third spacing of fluid. No defined fluid collection is  identified to suggest abscess. No additional acute findings within the  abdomen/pelvis.  2. Trace bilateral pleural effusions, and associated bibasilar  atelectasis. The lung bases are otherwise clear.  3. Postprocedural changes of LVAD placement. The components are  unremarkable on these noncontrast images, without associated air or  inflammatory stranding to suggest drive line infection.     I have personally reviewed the examination and initial interpretation  and I agree with the findings.    OSITO BRAVO MD   US Renal Complete    Narrative    EXAMINATION: US RENAL COMPLETE, 11/17/2020 1:43 PM     COMPARISON: None.    HISTORY: Rule out obstruction    TECHNIQUE: The kidneys  and bladder were scanned in the standard  fashion with specialized ultrasound transducer(s) using both gray  scale and limited color/spectral Doppler techniques.    FINDINGS:    Right kidney: Measures 10.5 cm in length. Parenchyma is of normal  thickness and echogenicity. No focal mass. No hydronephrosis.    Left kidney: Measures 11.6 cm in length. Parenchyma is of normal  thickness and echogenicity. No focal mass. No hydronephrosis.     Bladder: Decompressed with Guevara catheter.      Impression    IMPRESSION:  1.  Normal renal ultrasound study.    OSITO BRAVO MD   XR Chest Port 1 View    Narrative    Exam: XR CHEST PORT 1 VW, 2020 2:19 PM    Indication: PICC Placement    Comparison: 2020    Findings:   Right upper extremity PICC tip at the mid to low SVC. Left chest wall  single lead automatic implantable cardiac defibrillator. Stable LVAD.  Stable enlarged cardiac silhouette. The pulmonary vasculature is  within normal limits. No pleural effusion or pneumothorax. No focal  airspace opacity. Low lung volumes.      Impression    Impression: Right upper extremity PICC tip at the mid to low SVC.    HCARLES HERNANDEZ,    Echo Complete    Narrative    [id number]  [id number]  [id number]  [id number]^PHYLLIS NAIR^CONNIE^YODIT           [facility],[place]  Echocardiography Laboratory  [address] [place] [address]     Name: WOLFGANG LEACH  MRN: [id number]  : 1955  Study Date: 2020 10:56 AM  Age: 65 yrs  Gender: Male  Patient Location: [facility]  Reason For Study: Endocarditis  Ordering Physician: CONNIE JIN  Performed By: Nelly Monte RDCS     BSA: 2.1 m2  Height: 67 in  Weight: 220 lb  HR: 118  BP: 97/67 mmHg  _____________________________________________________________________________  __        Procedure  LVAD Echocardiogram with two-dimensional, color and spectral Doppler  performed. Technically difficult study.Extremely poor  acoustic windows.  Limited information was obtained during study.  _____________________________________________________________________________  __        Interpretation Summary  Technically difficult study.Extremely poor acoustic windows.  Limited information was obtained during study.  LVAD cannula was seen in the expected anatomic position in the LV apex.  HM3 at 5500RPM.  LVIDd 48mm.  Septum probably normal.  Aortic valve cannot be assessed.  Flow velocities not obtained.  Dilation of the inferior vena cava is present with abnormal respiratory  variation in diameter.  No pericardial effusion is present.  _____________________________________________________________________________  __        Left Ventricle  The Ejection Fraction is estimated at <20%. LVAD cannula was seen in the  expected anatomic position in the LV apex. HM3 at 5500RPM.  LVIDd 48mm.  Septum probably normal.  Aortic valve cannot be assessed.  Flow velocities not obtained.     Right Ventricle  Right ventricular function cannot be assessed due to poor image quality.     Vessels  Dilation of the inferior vena cava is present with abnormal respiratory  variation in diameter.     Pericardium  No pericardial effusion is present.     _____________________________________________________________________________  __  MMode/2D Measurements & Calculations  IVSd: 0.92 cm  LVIDd: 4.8 cm  LVIDs: 4.6 cm  LVPWd: 0.88 cm     FS: 4.6 %  LV mass(C)d: 147.6 grams  LV mass(C)dI: 70.1 grams/m2  asc Aorta Diam: 3.5 cm  RWT: 0.37           _____________________________________________________________________________  __           Report approved by: Graciela Tomlinson 11/16/2020 11:39 AM      Transesophageal Echocardiogram    Narrative    [id number]  [id number]  [id number]  [id number]^MIGUEL ANGEL           [facility],[place]  Echocardiography Laboratory  [address] [place] [id number]        Name: WOLFGANG LEACH  MRN:  [id number]  : 1955  Study Date: 2020 09:03 AM  Age: 65 yrs  Gender: Male  Patient Location: [facility] [place]  Reason For Study: Bacteremia  History: Sepsis  Ordering Physician: ANIKA CASTRO  Performed By: Maciel House     BSA: 2.1 m2  Height: 67 in  Weight: 210 lb  HR: 116  BP: 69/39 mmHg  Attestation  I was present during CHAMP probe placement by the fellow, Maciel House. I  personally viewed the imaging and agree with the interpretation and report as  documented by the fellow.  _____________________________________________________________________________  __     Interpretation Summary  S/P HM 3 LVAD. LVAD inflow cannula with normal doppler.     No vegetations on valves or ICD wire.     _____________________________________________________________________________  __        Procedure  Transesophageal Echocardiogram with color and spectral Doppler performed.  Procedure location Echo Lab. The procedure was performed in the Echo Lab. CHAMP  Probe #58 was used during the procedure. Patient was sedated using Fentanyl  100 mcg. Patient was sedated using Versed 2 mg. The heart rate, respiratory  rate, oxygen saturations, blood pressure, and response to care were monitored  throughout the procedure with the assistance of the nurse. I determined this  patient to be an appropriate candidate for the planned sedation and procedure  and have reassessed the patient immediately prior to sedation and procedure.  Total sedation time: 34 minutes of continuous bedside 1:1 monitoring. The  Transducer was inserted without difficulty . The patient tolerated the  procedure well. Complications None. Informed consent for Transesophegeal echo  obtained. The patient's rhythm is paced. Good quality two-dimensional was  performed and interpreted. Good quality color and spectral Doppler were  performed and interpreted.     Left Ventricle  Mild left ventricular dilation is present. Severely (EF 10-20%) reduced left  ventricular function is  present. LVAD cannula was seen in the expected  anatomic position in the LV apex. S/P HM 3 LVAD 5500 RPM. Left ventricular  wall thickness is normal.     Right Ventricle  Moderate right ventricular dilation is present. Global right ventricular  function is moderately reduced. A pacemaker lead is noted in the right  ventricle. No vegetations seen on Single lead ICD wire.     Atria  The left atrial appendage is normal. It is free of spontaneous echo contrast  and thrombus. Severe biatrial enlargement is present. The atrial septum is  intact as assessed by color Doppler .        Mitral Valve  Mitral leaflet thickness is normal. Mild to moderate mitral insufficiency is  present.     Aortic Valve  The aortic valve is tricuspid. The aortic valve remains closed with every  beat. Mild aortic valve sclerosis is present. Mild aortic insufficiency is  present.     Tricuspid Valve  The tricuspid valve is normal. Mild to moderate tricuspid insufficiency is  present.     Pulmonic Valve  The pulmonic valve is normal. Trace pulmonic insufficiency is present.     Vessels  The thoracic aorta is normal. Ascending aorta 2.9 cm.        Pericardium  No pericardial effusion is present.     Compared to Previous Study  This study was compared with the study from 11/16/2020 . There has been no  change.     _____________________________________________________________________________  __                       Report approved by: Graciela Dubose 11/19/2020 11:27 AM           _____________________________________________________________________________  __            Eliseo is a 65 year old who is being evaluated via a billable telephone visit.      What phone number would you like to be contacted at? [phone]  How would you like to obtain your AVS? Mail a copy  Phone call duration: 10 minutes

## 2021-02-04 NOTE — PROGRESS NOTES
Eliseo is a 65 year old who is being evaluated via a billable telephone visit.      What phone number would you like to be contacted at? [phone]  How would you like to obtain your AVS? Mail a copy  Phone call duration: 10 minutes

## 2021-02-04 NOTE — PATIENT INSTRUCTIONS
Please let us know if the drainage increases or changes in characteristic. You have a LVAD check on Monday so will ask that they look at the drainage as well.

## 2021-02-04 NOTE — PROGRESS NOTES
In person visit    HPI:   Eliseo Tanner is a 65 year old male with chronic systolic heart failure secondary to NICM now s/p HM 3 on 2/18, moderate CAD, HTN, ABHINAV on CPAP, DM2, CKD Stage III, ANA. His HM3 post-op course was complicated by retrosternal hematoma and bleeding in the lungs, RV failure,VT in ICU now on amiodarone and Afib w/AVR S/p DCCV on 2/28. He had pre-op proteus and enterococcus bacteremia from 2/13, s/p abx. He had an ICD placed on 3/16/2020. He presents today for LVAD follow-up.    He was admitted from 1/5-1/8 for dizziness and near syncope. He was found to be hypovolemic with RHC showed mRA 5, EDP 5, mPA 13, PCWP 3. He received IV fluids. Bumex was held during his hospital stay.    This visit  Home weight didn't have today, but yesterday was 211. He thinks the weight is higher here because of his equipment.    No SOB at rest. Can walk a few blocks before he would feel MIRANDA, this is better than in Nov. Could do a few flights of stair.No orhtopean No PND. No LE edema. No abdominal edema. Some lightheadedness with very quick position chagnes, but otherwise none. No palpitaitons. No chest pain. Appetite is robust.     No blood in the urine or blood in the stool. No prolonged nosebleeds.    Occasional headaches, but this is his chronic baseline.No stroke symptoms.    Driveline has been draining. It had been clearing the last time he went into the hospital, since he left it has been getting worse. The drainges waxes and wanes. Milky green drainage. No foul odor. He is on Keflex. No pain around the driveline.    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    SOCIAL HISTORY:  Social History     Socioeconomic History     Marital status:      Spouse name: Not on file     Number of children: Not on file     Years of education: Not on file     Highest education level: Not on file   Occupational History     Not on file   Social Needs     Financial resource strain: Not on file     Food insecurity     Worry: Not on file     Inability: Not on file     Transportation needs     Medical: Not on file     Non-medical: Not on file   Tobacco Use     Smoking status: Not on file   Substance and Sexual Activity     Alcohol use: Not on file     Drug use: Not on file     Sexual activity: Not on file   Lifestyle     Physical activity     Days per week: Not on file     Minutes per session: Not on file     Stress: Not on file   Relationships     Social connections     Talks on phone: Not on file     Gets together: Not on file     Attends Denominational service: Not on file     Active member of club or organization: Not on file     Attends meetings of clubs or organizations: Not on file     Relationship status: Not on file     Intimate partner violence     Fear of current or ex partner: Not on file     Emotionally abused: Not on file     Physically abused: Not on file     Forced sexual activity: Not on file   Other Topics Concern     Not on file   Social History Narrative     Not on file       CURRENT MEDICATIONS:       acetaminophen (TYLENOL) 325 MG tablet, Take 2 tablets (650 mg) by mouth every 4 hours as needed for mild pain or fever       allopurinol (ZYLOPRIM) 100 MG tablet, 2 tablets (200 mg) by Oral or Feeding Tube route daily       amiodarone (PACERONE) 200 MG tablet, Take 1 tablet (200 mg) by mouth daily       aspirin (ASA) 81 MG EC tablet, Take 1 tablet (81 mg) by mouth daily        "atorvastatin (LIPITOR) 20 MG tablet, Take 20 mg by mouth daily       bumetanide (BUMEX) 1 MG tablet, Take 1 tablet (1 mg) by mouth daily       co-enzyme Q-10 200 MG CAPS, 200 mg by Oral or Feeding Tube route daily       finasteride (PROSCAR) 5 MG tablet, Take 1 tablet (5 mg) by mouth daily Helps with urinary retention.       FLUoxetine (PROZAC) 10 MG capsule, Take 30 mg by mouth daily       hydrALAZINE (APRESOLINE) 50 MG tablet, Take 2 tablets (100 mg) by mouth 3 times daily       insulin glargine (BASAGLAR KWIKPEN) 100 UNIT/ML pen, Inject 10 Units Subcutaneous At Bedtime        insulin pen needle (BD JAIME U/F) 32G X 4 MM miscellaneous,        lisinopril (ZESTRIL) 10 MG tablet, Take 1 tablet (10 mg) by mouth daily       magnesium oxide (MAG-OX) 400 MG tablet, 1 tablet (400 mg) by Oral or Feeding Tube route daily       multivitamin w/minerals (THERA-VIT-M) tablet, Take 1 tablet by mouth daily       nitroGLYcerin (NITROSTAT) 0.4 MG sublingual tablet, For chest pain place 1 tablet under the tongue every 5 minutes for 3 doses. If symptoms persist 5 minutes after 1st dose call 911.       omeprazole (PRILOSEC) 20 MG DR capsule, Take 20 mg by mouth daily       senna-docusate (SENOKOT-S/PERICOLACE) 8.6-50 MG tablet, Take 1 tablet by mouth 2 times daily as needed for constipation       tamsulosin (FLOMAX) 0.4 MG capsule, Take 1 capsule (0.4 mg) by mouth daily This med helps with urinary retention       warfarin ANTICOAGULANT (COUMADIN) 2 MG tablet, Take 4 mg by mouth daily        zolpidem (AMBIEN) 5 MG tablet, Take 5 mg by mouth nightly as needed for Insomnia    No current facility-administered medications on file prior to visit.       ROS:   See HPI    EXAM:  BP (!) 78/0 (BP Location: Right arm, Patient Position: Chair, Cuff Size: Adult Regular)   Pulse 107   Ht 1.702 m (5' 7\")   Wt 100.2 kg (221 lb)   SpO2 98%   BMI 34.61 kg/m      GENERAL: Appears comfortable, in no acute distress.   HEENT: Eye symmetrical, no " discharge or icterus bilaterally. Mucous membranes moist and without lesions.  CV: Hum of HM3, occasional S1S2. JVP ~10.   RESPIRATORY: Respirations regular, even, and unlabored. Lungs CTA throughout.   GI: Soft, non distended with normoactive bowel sounds. No tenderness, rebound, guarding.   EXTREMITIES: No peripheral edema. 2+ bilateral pedal pulses.   NEUROLOGIC: Alert and interacting appropriately. No focal deficits.   MUSCULOSKELETAL: No joint swelling or tenderness.   SKIN: No jaundice. No rashes or lesions. Driveline dressing c/d/i.        Labs - as reviewed in clinic with patient today:  CBC RESULTS:  Lab Results   Component Value Date    WBC 6.3 02/08/2021    RBC 4.43 02/08/2021    HGB 9.8 (L) 02/08/2021    HCT 33.6 (L) 02/08/2021    MCV 76 (L) 02/08/2021    MCH 22.1 (L) 02/08/2021    MCHC 29.2 (L) 02/08/2021    RDW 22.2 (H) 02/08/2021     02/08/2021       CMP RESULTS:  Lab Results   Component Value Date     02/08/2021    POTASSIUM 4.5 02/08/2021    CHLORIDE 102 02/08/2021    CO2 26 02/08/2021    ANIONGAP 8 02/08/2021     (H) 02/08/2021    BUN 48 (H) 02/08/2021    CR 2.26 (H) 02/08/2021    GFRESTIMATED 29 (L) 02/08/2021    GFRESTBLACK 33 (L) 02/08/2021    CALOS 8.8 02/08/2021    BILITOTAL 0.5 02/08/2021    ALBUMIN 3.7 02/08/2021    ALKPHOS 127 02/08/2021    ALT 42 02/08/2021    AST 43 02/08/2021        INR RESULTS:  Lab Results   Component Value Date    INR 2.1 (A) 02/11/2021       Lab Results   Component Value Date    MAG 2.5 (H) 01/08/2021     Lab Results   Component Value Date    NTBNPI 12,559 (H) 02/08/2020     No results found for: NTBNP    Diagnostics    1/7/2021 RHC   Systolic (mmHg) Diastolic (mmHg) Mean (mmHg) A Wave (mmHg) V Wave (mmHg) EDP (mmHg) Max dp/dt (mmHg/sec) HR (bpm) Content (mL/dL) SAT (%)    RA Pressures  1:03 PM   5    4    9      84        RV Pressures  1:03 PM 24        5     84        PA Pressures  1:04 PM 24    7    13        88        PCW Pressures  1:04 PM   3         55          1/5/2021 ECHO    Interpretation Summary  Heartmate III LVAD, speed 5500 RPM     Left ventricular size is normal. Severely (EF 10-15%) reduced left ventricular  function is present.     LVAD cannula is not well seen in the LV apex. The outflow graft is well  visualized and Doppler is normal .     Mild to moderate right ventricular dilation is present. Global right  ventricular function is mildly to moderately reduced.     Aortic valve opens with every heart beat.     The inferior vena cava was normal in size with preserved respiratory  variability.     No pericardial effusion is present.      Assessment and Plan:   Eliseo Tanner is a 65 year old male with chronic systolic heart failure secondary to NICM now s/p HM 3 on 2/18, moderate CAD, HTN, ABHINAV on CPAP, DM2, CKD Stage III, ANA. His HM3 post-op course was complicated by retrosternal hematoma, RV failure,VT in ICU now on amiodarone and Afib w/AVR S/p DCCV on 2/28. He had pre-op proteus and enterococcus bacteremia from 2/13, s/p abx. He had an ICD placed on 3/16/2020.      He has had multiple admissions this year. Initially for hypervolemia, but most recently for hypovolemia and near syncope. Since that last discharge his driveline infection, that we have been dealing with for a while now, has been worse with more drainage. His CT today has no fluid collection (seroma that has been present since last year, decreasing in size), but he does have abdominal wall thickening/fat stranding which is new compared to December. See below. No systemic symptoms. Will get blood cultures and wound cultures.    Chronic SCHF secondary to NICM s/p HM III with RV dysfunction. Implanted 2/18 and complicated by bleeding.   Stage D, NYHA Class IIIA  ACEi/ARB Lisinopril 10 mg daily, hydralazine 100 mg TID  BB Has been deferred given RHF, may be able to retrial in the next coming visits  Aldosterone antagonist deferred while other medical therapy is  prioritized  SCD prophylaxis ICD  Fluid status Euvolemic: Continue bumex 1 mg daily, when he takes his PRN dose please take 0.5 mg rather than a rull tab  MAP: Goal 65-85  LDH: 302, stable  Anticoagulation: Coumadin per pharmacy.  Goal 2-3, INR 1.97  Antiplatelet: ASA 81 mg po daily    CKD stage IV  Baseline has increased over the last year. Cuttent B/l is around 1.8-2.2  - If continues to worsen, need to stop lisinopril (GFR is 29 today)  - Will defer to renal for further w/u    VAD interrogation 2/8/21  VAD interrogation reviewed with VAD coordinator. Agree with findings. Occasional PI events. No power spikes, speed drops, or other findings suspicious of pump malfunction noted.     MSSA Driveline infection Worsening driveline drainage. Discussed with ID. Will reculture today. Will obtain blood cultures. CT with abdominal wall stranding but no drainable fluid collection.  - Discussed this case with ID today, Dr. Bhatti.   - Per ID will increase his Keflex dosing, will await cultures for consideration of further antibiotics  - Will follow blood cultures and abdominal wound cultures  - If he does need IVs based on culture data would possibly be able to place PICC  to sheree.    A. Fib. History of Afib w/ RVR and aberrancy vs. FCI vs. AT vs. Slow VT. S/p DCCV on 2/28 back into sinus rhythm. Up to 107 today, at prior visits has run in the 120s. VAD is tolerating.  - Will reach out to EP  - On amiodarone    HTN.    - Continue Lisinopril   - Continue hydralazine     Follow up   1. Please follow-up with VAD LISA in 1 month, as previously scheduled, with labs prior. Can appointment be virtual? No  2. Please follow-up with Dr. Cisneros in June, as previously scheduled, with labs prior.  Can appointment be virtual? No    Billing  - I reviewed 3+ tests  - I ordered 1 test  - I managed 2 stable chronic problems  - I discussed this case with another provider (Dr. Bhatti)      BIJAL Padron,  BRANNON

## 2021-02-05 ENCOUNTER — DOCUMENTATION ONLY (OUTPATIENT)
Dept: NEPHROLOGY | Facility: CLINIC | Age: 66
End: 2021-02-05

## 2021-02-05 NOTE — PROGRESS NOTES
Brief Nephrology Note    Received fax with labs for this patient. His known monoclonal gammopathy, thought an MGUS, persists with reduced IgG concentration (1.1 g/dl from 1.2 g/dl on last measurement). Free light chain ratio remains WNL. Thus not clear e/o a process that would result in his variable creatinine.     Total proteinuria is 0.2 g/g, which is only mildly elevated, and he is appropriately on ACEi.     Olivier Yoon MD  Nephrology

## 2021-02-08 ENCOUNTER — ANTICOAGULATION THERAPY VISIT (OUTPATIENT)
Dept: ANTICOAGULATION | Facility: CLINIC | Age: 66
End: 2021-02-08

## 2021-02-08 ENCOUNTER — OFFICE VISIT (OUTPATIENT)
Dept: CARDIOLOGY | Facility: CLINIC | Age: 66
End: 2021-02-08
Attending: INTERNAL MEDICINE
Payer: MEDICARE

## 2021-02-08 ENCOUNTER — ANCILLARY PROCEDURE (OUTPATIENT)
Dept: CARDIOLOGY | Facility: CLINIC | Age: 66
End: 2021-02-08
Attending: PHYSICIAN ASSISTANT
Payer: MEDICARE

## 2021-02-08 ENCOUNTER — ANCILLARY PROCEDURE (OUTPATIENT)
Dept: CT IMAGING | Facility: CLINIC | Age: 66
End: 2021-02-08
Attending: PHYSICIAN ASSISTANT
Payer: MEDICARE

## 2021-02-08 VITALS
SYSTOLIC BLOOD PRESSURE: 78 MMHG | WEIGHT: 221 LBS | HEART RATE: 107 BPM | BODY MASS INDEX: 34.69 KG/M2 | HEIGHT: 67 IN | OXYGEN SATURATION: 98 %

## 2021-02-08 DIAGNOSIS — N18.32 STAGE 3B CHRONIC KIDNEY DISEASE (H): ICD-10-CM

## 2021-02-08 DIAGNOSIS — L08.9 WOUND INFECTION: ICD-10-CM

## 2021-02-08 DIAGNOSIS — Z95.811 LVAD (LEFT VENTRICULAR ASSIST DEVICE) PRESENT (H): ICD-10-CM

## 2021-02-08 DIAGNOSIS — I48.0 PAROXYSMAL ATRIAL FIBRILLATION (H): ICD-10-CM

## 2021-02-08 DIAGNOSIS — R79.89 ELEVATED SERUM CREATININE: ICD-10-CM

## 2021-02-08 DIAGNOSIS — I50.22 CHRONIC SYSTOLIC CONGESTIVE HEART FAILURE (H): ICD-10-CM

## 2021-02-08 DIAGNOSIS — T14.8XXA WOUND INFECTION: ICD-10-CM

## 2021-02-08 DIAGNOSIS — I50.22 CHRONIC SYSTOLIC CONGESTIVE HEART FAILURE (H): Primary | ICD-10-CM

## 2021-02-08 DIAGNOSIS — I50.23 ACUTE ON CHRONIC SYSTOLIC CONGESTIVE HEART FAILURE (H): ICD-10-CM

## 2021-02-08 DIAGNOSIS — T82.9XXA COMPLICATION INVOLVING LEFT VENTRICULAR ASSIST DEVICE (LVAD), INITIAL ENCOUNTER: ICD-10-CM

## 2021-02-08 LAB
ALBUMIN SERPL-MCNC: 3.7 G/DL (ref 3.4–5)
ALP SERPL-CCNC: 127 U/L (ref 40–150)
ALT SERPL W P-5'-P-CCNC: 42 U/L (ref 0–70)
ANION GAP SERPL CALCULATED.3IONS-SCNC: 8 MMOL/L (ref 3–14)
AST SERPL W P-5'-P-CCNC: 43 U/L (ref 0–45)
BILIRUB SERPL-MCNC: 0.5 MG/DL (ref 0.2–1.3)
BUN SERPL-MCNC: 48 MG/DL (ref 7–30)
CALCIUM SERPL-MCNC: 8.8 MG/DL (ref 8.5–10.1)
CHLORIDE SERPL-SCNC: 102 MMOL/L (ref 94–109)
CO2 SERPL-SCNC: 26 MMOL/L (ref 20–32)
CREAT SERPL-MCNC: 2.26 MG/DL (ref 0.66–1.25)
D DIMER PPP FEU-MCNC: 4.1 UG/ML FEU (ref 0–0.5)
ERYTHROCYTE [DISTWIDTH] IN BLOOD BY AUTOMATED COUNT: 22.2 % (ref 10–15)
GFR SERPL CREATININE-BSD FRML MDRD: 29 ML/MIN/{1.73_M2}
GLUCOSE SERPL-MCNC: 136 MG/DL (ref 70–99)
GRAM STN SPEC: NORMAL
GRAM STN SPEC: NORMAL
HCT VFR BLD AUTO: 33.6 % (ref 40–53)
HGB BLD-MCNC: 9.8 G/DL (ref 13.3–17.7)
INR PPP: 1.97 (ref 0.86–1.14)
LDH SERPL L TO P-CCNC: 302 U/L (ref 85–227)
Lab: NORMAL
MCH RBC QN AUTO: 22.1 PG (ref 26.5–33)
MCHC RBC AUTO-ENTMCNC: 29.2 G/DL (ref 31.5–36.5)
MCV RBC AUTO: 76 FL (ref 78–100)
PLATELET # BLD AUTO: 323 10E9/L (ref 150–450)
POTASSIUM SERPL-SCNC: 4.5 MMOL/L (ref 3.4–5.3)
PROT SERPL-MCNC: 8.3 G/DL (ref 6.8–8.8)
RBC # BLD AUTO: 4.43 10E12/L (ref 4.4–5.9)
SODIUM SERPL-SCNC: 136 MMOL/L (ref 133–144)
SPECIMEN SOURCE: NORMAL
WBC # BLD AUTO: 6.3 10E9/L (ref 4–11)

## 2021-02-08 PROCEDURE — 87077 CULTURE AEROBIC IDENTIFY: CPT | Mod: 91 | Performed by: PHYSICIAN ASSISTANT

## 2021-02-08 PROCEDURE — 87040 BLOOD CULTURE FOR BACTERIA: CPT | Performed by: PHYSICIAN ASSISTANT

## 2021-02-08 PROCEDURE — 87070 CULTURE OTHR SPECIMN AEROBIC: CPT | Mod: XU | Performed by: PHYSICIAN ASSISTANT

## 2021-02-08 PROCEDURE — 83615 LACTATE (LD) (LDH) ENZYME: CPT | Performed by: PATHOLOGY

## 2021-02-08 PROCEDURE — 99214 OFFICE O/P EST MOD 30 MIN: CPT | Mod: 25 | Performed by: PHYSICIAN ASSISTANT

## 2021-02-08 PROCEDURE — 85027 COMPLETE CBC AUTOMATED: CPT | Performed by: PATHOLOGY

## 2021-02-08 PROCEDURE — G1004 CDSM NDSC: HCPCS | Mod: GC | Performed by: RADIOLOGY

## 2021-02-08 PROCEDURE — 87186 SC STD MICRODIL/AGAR DIL: CPT | Performed by: PHYSICIAN ASSISTANT

## 2021-02-08 PROCEDURE — 93922 UPR/L XTREMITY ART 2 LEVELS: CPT

## 2021-02-08 PROCEDURE — 36415 COLL VENOUS BLD VENIPUNCTURE: CPT | Performed by: PATHOLOGY

## 2021-02-08 PROCEDURE — 85379 FIBRIN DEGRADATION QUANT: CPT | Performed by: PHYSICIAN ASSISTANT

## 2021-02-08 PROCEDURE — 82306 VITAMIN D 25 HYDROXY: CPT | Performed by: STUDENT IN AN ORGANIZED HEALTH CARE EDUCATION/TRAINING PROGRAM

## 2021-02-08 PROCEDURE — 85610 PROTHROMBIN TIME: CPT | Performed by: PATHOLOGY

## 2021-02-08 PROCEDURE — 74176 CT ABD & PELVIS W/O CONTRAST: CPT | Mod: MG | Performed by: RADIOLOGY

## 2021-02-08 PROCEDURE — 36415 COLL VENOUS BLD VENIPUNCTURE: CPT | Performed by: PHYSICIAN ASSISTANT

## 2021-02-08 PROCEDURE — 71250 CT THORAX DX C-: CPT | Mod: MG | Performed by: RADIOLOGY

## 2021-02-08 PROCEDURE — 999N000105 HC STATISTIC NO DOCUMENTATION TO SUPPORT CHARGE

## 2021-02-08 PROCEDURE — 80053 COMPREHEN METABOLIC PANEL: CPT | Performed by: PATHOLOGY

## 2021-02-08 PROCEDURE — 93282 PRGRMG EVAL IMPLANTABLE DFB: CPT | Performed by: INTERNAL MEDICINE

## 2021-02-08 PROCEDURE — 93750 INTERROGATION VAD IN PERSON: CPT | Performed by: PHYSICIAN ASSISTANT

## 2021-02-08 PROCEDURE — 87075 CULTR BACTERIA EXCEPT BLOOD: CPT | Performed by: PHYSICIAN ASSISTANT

## 2021-02-08 PROCEDURE — 87205 SMEAR GRAM STAIN: CPT | Performed by: PHYSICIAN ASSISTANT

## 2021-02-08 PROCEDURE — 87147 CULTURE TYPE IMMUNOLOGIC: CPT | Performed by: PHYSICIAN ASSISTANT

## 2021-02-08 PROCEDURE — 83883 ASSAY NEPHELOMETRY NOT SPEC: CPT | Performed by: PHYSICIAN ASSISTANT

## 2021-02-08 PROCEDURE — G0463 HOSPITAL OUTPT CLINIC VISIT: HCPCS | Mod: 25

## 2021-02-08 ASSESSMENT — MIFFLIN-ST. JEOR: SCORE: 1736.08

## 2021-02-08 ASSESSMENT — PAIN SCALES - GENERAL: PAINLEVEL: NO PAIN (0)

## 2021-02-08 NOTE — NURSING NOTE
Chief Complaint   Patient presents with     Follow Up     (TCM) LVAD return device with ICD and labs     Caryl Michael

## 2021-02-08 NOTE — PATIENT INSTRUCTIONS
Medications:  1. When you need extra bumex please take 0.5 mg of bumex rather than one full mg    Instructions:  1. You will need to have a CT scan and blood cultures before leaving today  2.  We will adjust your antibiotic plan once your cultures are all back    Follow-up: (make these appointments before you leave)  1. Please follow-up with VAD LISA in 1 month, as previously scheduled, with labs prior. Can appointment be virtual? No  2. Please follow-up with Dr. Cisneros in June, as previously scheduled, with labs prior.  Can appointment be virtual? No    Page the VAD Coordinator on call if you gain more than 3 lb in a day or 5 in a week. Please also page if you feel unwell or have alarms.     Great to see you in clinic today. To Page the VAD Coordinator on call, dial [phone] option #4 and ask to speak to the VAD coordinator on call.

## 2021-02-08 NOTE — NURSING NOTE
1). PUMP DATA  Primary controller serial number: [id number]    HM 3:   Flow: 4.3 L/min,    Speed: 5550 RPMs,     PI: 3.7 ,  Power: 4.5 Driver, Hct: 37     Primary controller   Back up battery: Patient use: 15, Replace in: 20  Months     Data downloaded: No   Equipment and driveline assessed for damage: Yes     Back up : Serial number: [id number]  Back up battery: Patient use: 11 Replace in: 20  Months  Programmed settings identical to the settings on the primary controller : N/A      Education complete: Yes   Charge the BACKUP controller s backup battery every 6 months  Perform a self test on BACKUP every 6 months  Change the MPU s batteries every 6 months:Yes      2). ALARMS  Alarms reported by patient since last pump evaluation: No  Alarms or other finding noted during pump data history and alarm download: Yes.  Frequent PI events with speed drops.  PI range 1.5-5.0    Action Taken:  Reviewed data with patient: Yes      3). DRESSING CHANGE / DRIVELINE ASSESSMENT  Dressing change completed today: Yes  Appearance of Driveline site:       There was a fair amount of drainage at the site and on the dressing.  Cultures were sent.  ID consulted.  BC x2 and CT obtained.    Driveline stabilization: Method: [employer]  [ Teaching reinforced on need for stabilization of Driveline. ]

## 2021-02-08 NOTE — PROGRESS NOTES
ANTICOAGULATION FOLLOW-UP CLINIC VISIT    Patient Name:  Eliseo Tanner  Date:  2021  Contact Type:  Telephone    SUBJECTIVE:         OBJECTIVE    Recent labs: (last 7 days)     21  0821   INR 1.97*       ASSESSMENT / PLAN  No question data found.  Anticoagulation Summary  As of 2021    INR goal:  2.0-3.0   TTR:  89.6 % (10.1 mo)   INR used for dosin.97 (2021)   Warfarin maintenance plan:  6 mg (4 mg x 1.5) every Mon; 4 mg (4 mg x 1) all other days   Full warfarin instructions:  6 mg every Mon; 4 mg all other days   Weekly warfarin total:  30 mg   Plan last modified:  Gloria Abbasi RN (2021)   Next INR check:  2021   Priority:  Critical   Target end date:  Indefinite    Indications    LVAD (left ventricular assist device) present (H) [Z95.811]  Chronic systolic congestive heart failure (H) [I50.22]  Paroxysmal atrial fibrillation (H) [I48.0]             Anticoagulation Episode Summary     INR check location:      Preferred lab:      Send INR reminders to:   [facility]IC    Comments:  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  HM 3  placed 2020, 81mg ASA, Speak to spouse, Veronique at [phone]  2020:  Lab: [facility]:  ph: [phone] opt 5;   fax: [phone]      Anticoagulation Care Providers     Provider Role Specialty Phone number    Nader Cisneros MD Referring Cardiovascular Disease [phone]            See the Encounter Report to view Anticoagulation Flowsheet and Dosing Calendar (Go to Encounters tab in chart review, and find the Anticoagulation Therapy Visit)    Spoke with Veronique.     Gloria Abbasi RN

## 2021-02-08 NOTE — PATIENT INSTRUCTIONS
It was a pleasure to see you in clinic today.  Please do not hesitate to call with any questions or concerns.  We look forward to seeing you in clinic at your next device check in 1 month.    Anila Wang, RN, MS, CCRN  Electrophysiology Nurse Clinician  [facility]    During Business Hours Please Call:  [phone]  After Hours Please Call:  [phone] - select option #[phone] and ask for job code [phone]

## 2021-02-08 NOTE — LETTER
Patient Name: Eliseo Tanner   : 1955   Diagnosis/ICD-10: Heart Failure, unspecified I50.9; LVAD Z95.811   Requesting Physician: Dr. Nader Cisneros   Date of Request: 21   Date to Draw 2/15/2021             **Please fax results to Gladys VANESSA RN VAD Coord at [phone].                               Call [phone] with Questions    ORDER CODE TESTS NANCY.VOL.    CBASIC Na, K, Cl, CO2, Crea, BUN, Glu, Ca GG 0.5-1    BHEPAT Alb, AlkP, ALT, AST, BBil, TP GG 0.5-1    BLIP Chol, Trig, HDL, LDL GG 1-2    CCOMP Na, K, Cl, CO2, Crea, BUN, Glu, Ca, Alb, AlkP, ALT, AST, Bbil, TP,  GG 0.6-1    HGP CBC & Platelet P 0.3-1    HGDP CBC, Differential & Platelet P 0.3-1    PLT Platelet  P 0.3-1    INR INR B 2.7    PTT PTT B 2.7   X LD Lactate Dehydrogenase GG 0.3-1    PHGB Plasma Hemoglobin GG 2-3    Pre-Albumin Pre-Albumin RG 1.0   X  D Dimer              Signed,      Nader Cisneros MD  Heart Failure, Mechanical Circulatory Support and Transplant Cardiology   of Medicine,  Division of Cardiology, [facility]

## 2021-02-08 NOTE — LETTER
2/8/2021      RE: Eliseo Tanner  [address] [place] [address]       Dear Colleague,    Thank you for the opportunity to participate in the care of your patient, Eliseo Tanner, at the [facility] [place] [facility]. Please see a copy of my visit note below.    In person visit    HPI:   Eliseo Tanner is a 65 year old male with chronic systolic heart failure secondary to NICM now s/p HM 3 on 2/18, moderate CAD, HTN, ABHINAV on CPAP, DM2, CKD Stage III, ANA. His HM3 post-op course was complicated by retrosternal hematoma and bleeding in the lungs, RV failure,VT in ICU now on amiodarone and Afib w/AVR S/p DCCV on 2/28. He had pre-op proteus and enterococcus bacteremia from 2/13, s/p abx. He had an ICD placed on 3/16/2020. He presents today for LVAD follow-up.    He was admitted from 1/5-1/8 for dizziness and near syncope. He was found to be hypovolemic with RHC showed mRA 5, EDP 5, mPA 13, PCWP 3. He received IV fluids. Bumex was held during his hospital stay.    This visit  Home weight didn't have today, but yesterday was 211. He thinks the weight is higher here because of his equipment.    No SOB at rest. Can walk a few blocks before he would feel MIRANDA, this is better than in Nov. Could do a few flights of stair.No orhtopean No PND. No LE edema. No abdominal edema. Some lightheadedness with very quick position chagnes, but otherwise none. No palpitaitons. No chest pain. Appetite is robust.     No blood in the urine or blood in the stool. No prolonged nosebleeds.    Occasional headaches, but this is his chronic baseline.No stroke symptoms.    Driveline has been draining. It had been clearing the last time he went into the hospital, since he left it has been getting worse. The drainges waxes and wanes. Milky green drainage. No foul odor. He is on Keflex. No pain around the driveline.    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    SOCIAL HISTORY:  Social History     Socioeconomic History     Marital status:      Spouse name: Not on file     Number of children: Not on file     Years of education: Not on file     Highest education level: Not on file   Occupational History     Not on file   Social Needs     Financial resource strain: Not on file     Food insecurity     Worry: Not on file     Inability: Not on file     Transportation needs     Medical: Not on file     Non-medical: Not on file   Tobacco Use     Smoking status: Not on file   Substance and Sexual Activity     Alcohol use: Not on file     Drug use: Not on file     Sexual activity: Not on file   Lifestyle     Physical activity     Days per week: Not on file     Minutes per session: Not on file     Stress: Not on file   Relationships     Social connections     Talks on phone: Not on file     Gets together: Not on file     Attends Ca[employer]ism service: Not on file     Active member of club or organization: Not on file     Attends meetings of clubs or organizations: Not on file     Relationship status: Not on file     Intimate partner violence     Fear of current or ex partner: Not on file     Emotionally abused: Not on file     Physically abused: Not on file     Forced sexual activity: Not on file   Other Topics Concern     Not on file   Social History Narrative     Not on file       CURRENT MEDICATIONS:       acetaminophen (TYLENOL) 325  MG tablet, Take 2 tablets (650 mg) by mouth every 4 hours as needed for mild pain or fever       allopurinol (ZYLOPRIM) 100 MG tablet, 2 tablets (200 mg) by Oral or Feeding Tube route daily       amiodarone (PACERONE) 200 MG tablet, Take 1 tablet (200 mg) by mouth daily       aspirin (ASA) 81 MG EC tablet, Take 1 tablet (81 mg) by mouth daily       atorvastatin (LIPITOR) 20 MG tablet, Take 20 mg by mouth daily       bumetanide (BUMEX) 1 MG tablet, Take 1 tablet (1 mg) by mouth daily       co-enzyme Q-10 200 MG CAPS, 200 mg by Oral or Feeding Tube route daily       finasteride (PROSCAR) 5 MG tablet, Take 1 tablet (5 mg) by mouth daily Helps with urinary retention.       FLUoxetine (PROZAC) 10 MG capsule, Take 30 mg by mouth daily       hydrALAZINE (APRESOLINE) 50 MG tablet, Take 2 tablets (100 mg) by mouth 3 times daily       insulin glargine (BASAGLAR KWIKPEN) 100 UNIT/ML pen, Inject 10 Units Subcutaneous At Bedtime        insulin pen needle (BD JAIME U/F) 32G X 4 MM miscellaneous,        lisinopril (ZESTRIL) 10 MG tablet, Take 1 tablet (10 mg) by mouth daily       magnesium oxide (MAG-OX) 400 MG tablet, 1 tablet (400 mg) by Oral or Feeding Tube route daily       multivitamin w/minerals (THERA-VIT-M) tablet, Take 1 tablet by mouth daily       nitroGLYcerin (NITROSTAT) 0.4 MG sublingual tablet, For chest pain place 1 tablet under the tongue every 5 minutes for 3 doses. If symptoms persist 5 minutes after 1st dose call 911.       omeprazole (PRILOSEC) 20 MG DR capsule, Take 20 mg by mouth daily       senna-docusate (SENOKOT-S/PERICOLACE) 8.6-50 MG tablet, Take 1 tablet by mouth 2 times daily as needed for constipation       tamsulosin (FLOMAX) 0.4 MG capsule, Take 1 capsule (0.4 mg) by mouth daily This med helps with urinary retention       warfarin ANTICOAGULANT (COUMADIN) 2 MG tablet, Take 4 mg by mouth daily        zolpidem (AMBIEN) 5 MG tablet, Take 5 mg by mouth nightly as needed for Insomnia    No current  "facility-administered medications on file prior to visit.       ROS:   See HPI    EXAM:  BP (!) 78/0 (BP Location: Right arm, Patient Position: Chair, Cuff Size: Adult Regular)   Pulse 107   Ht 1.702 m (5' 7\")   Wt 100.2 kg (221 lb)   SpO2 98%   BMI 34.61 kg/m      GENERAL: Appears comfortable, in no acute distress.   HEENT: Eye symmetrical, no discharge or icterus bilaterally. Mucous membranes moist and without lesions.  CV: Hum of HM3, occasional S1S2. JVP ~10.   RESPIRATORY: Respirations regular, even, and unlabored. Lungs CTA throughout.   GI: Soft, non distended with normoactive bowel sounds. No tenderness, rebound, guarding.   EXTREMITIES: No peripheral edema. 2+ bilateral pedal pulses.   NEUROLOGIC: Alert and interacting appropriately. No focal deficits.   MUSCULOSKELETAL: No joint swelling or tenderness.   SKIN: No jaundice. No rashes or lesions. Driveline dressing c/d/i.        Labs - as reviewed in clinic with patient today:  CBC RESULTS:  Lab Results   Component Value Date    WBC 6.3 02/08/2021    RBC 4.43 02/08/2021    HGB 9.8 (L) 02/08/2021    HCT 33.6 (L) 02/08/2021    MCV 76 (L) 02/08/2021    MCH 22.1 (L) 02/08/2021    MCHC 29.2 (L) 02/08/2021    RDW 22.2 (H) 02/08/2021     02/08/2021       CMP RESULTS:  Lab Results   Component Value Date     02/08/2021    POTASSIUM 4.5 02/08/2021    CHLORIDE 102 02/08/2021    CO2 26 02/08/2021    ANIONGAP 8 02/08/2021     (H) 02/08/2021    BUN 48 (H) 02/08/2021    CR 2.26 (H) 02/08/2021    GFRESTIMATED 29 (L) 02/08/2021    GFRESTBLACK 33 (L) 02/08/2021    CALOS 8.8 02/08/2021    BILITOTAL 0.5 02/08/2021    ALBUMIN 3.7 02/08/2021    ALKPHOS 127 02/08/2021    ALT 42 02/08/2021    AST 43 02/08/2021        INR RESULTS:  Lab Results   Component Value Date    INR 2.1 (A) 02/11/2021       Lab Results   Component Value Date    MAG 2.5 (H) 01/08/2021     Lab Results   Component Value Date    NTBNPI 12,559 (H) 02/08/2020     No results found for: " NTBNP    Diagnostics    1/7/2021 RHC   Systolic (mmHg) Diastolic (mmHg) Mean (mmHg) A Wave (mmHg) V Wave (mmHg) EDP (mmHg) Max dp/dt (mmHg/sec) HR (bpm) Content (mL/dL) SAT (%)    RA Pressures  1:03 PM   5    4    9      84        RV Pressures  1:03 PM 24        5     84        PA Pressures  1:04 PM 24    7    13        88        PCW Pressures  1:04 PM   3        55          1/5/2021 ECHO    Interpretation Summary  Heartmate III LVAD, speed 5500 RPM     Left ventricular size is normal. Severely (EF 10-15%) reduced left ventricular  function is present.     LVAD cannula is not well seen in the LV apex. The outflow graft is well  visualized and Doppler is normal .     Mild to moderate right ventricular dilation is present. Global right  ventricular function is mildly to moderately reduced.     Aortic valve opens with every heart beat.     The inferior vena cava was normal in size with preserved respiratory  variability.     No pericardial effusion is present.      Assessment and Plan:   Eliseo Tanner is a 65 year old male with chronic systolic heart failure secondary to NICM now s/p HM 3 on 2/18, moderate CAD, HTN, ABHINAV on CPAP, DM2, CKD Stage III, ANA. His HM3 post-op course was complicated by retrosternal hematoma, RV failure,VT in ICU now on amiodarone and Afib w/AVR S/p DCCV on 2/28. He had pre-op proteus and enterococcus bacteremia from 2/13, s/p abx. He had an ICD placed on 3/16/2020.      He has had multiple admissions this year. Initially for hypervolemia, but most recently for hypovolemia and near syncope. Since that last discharge his driveline infection, that we have been dealing with for a while now, has been worse with more drainage. His CT today has no fluid collection (seroma that has been present since last year, decreasing in size), but he does have abdominal wall thickening/fat stranding which is new compared to December. See below. No systemic symptoms. Will get blood cultures and wound  cultures.    Chronic SCHF secondary to NICM s/p HM III with RV dysfunction. Implanted 2/18 and complicated by bleeding.   Stage D, NYHA Class IIIA  ACEi/ARB Lisinopril 10 mg daily, hydralazine 100 mg TID  BB Has been deferred given RHF, may be able to retrial in the next coming visits  Aldosterone antagonist deferred while other medical therapy is prioritized  SCD prophylaxis ICD  Fluid status Euvolemic: Continue bumex 1 mg daily, when he takes his PRN dose please take 0.5 mg rather than a rull tab  MAP: Goal 65-85  LDH: 302, stable  Anticoagulation: Coumadin per pharmacy.  Goal 2-3, INR 1.97  Antiplatelet: ASA 81 mg po daily    CKD stage IV  Baseline has increased over the last year. Cuttent B/l is around 1.8-2.2  - If continues to worsen, need to stop lisinopril (GFR is 29 today)  - Will defer to renal for further w/u    VAD interrogation 2/8/21  VAD interrogation reviewed with VAD coordinator. Agree with findings. Occasional PI events. No power spikes, speed drops, or other findings suspicious of pump malfunction noted.     MSSA Driveline infection Worsening driveline drainage. Discussed with ID. Will reculture today. Will obtain blood cultures. CT with abdominal wall stranding but no drainable fluid collection.  - Discussed this case with ID today, Dr. Bhatti.   - Per ID will increase his Keflex dosing, will await cultures for consideration of further antibiotics  - Will follow blood cultures and abdominal wound cultures  - If he does need IVs based on culture data would possibly be able to place PICC  to sheree.    A. Fib. History of Afib w/ RVR and aberrancy vs. NATHALIA vs. AT vs. Slow VT. S/p DCCV on 2/28 back into sinus rhythm. Up to 107 today, at prior visits has run in the 120s. VAD is tolerating.  - Will reach out to EP  - On amiodarone    HTN.    - Continue Lisinopril   - Continue hydralazine     Follow up   1. Please follow-up with VAD LISA in 1 month, as previously scheduled, with labs prior. Can  appointment be virtual? No  2. Please follow-up with Dr. Cisneros in June, as previously scheduled, with labs prior.  Can appointment be virtual? No    Billing  - I reviewed 3+ tests  - I ordered 1 test  - I managed 2 stable chronic problems  - I discussed this case with another provider (Dr. Bhatti)      BIJAL Padron TAMAS        Please do not hesitate to contact me if you have any questions/concerns.     Sincerely,     Mervat Decker PA-C

## 2021-02-09 ENCOUNTER — DOCUMENTATION ONLY (OUTPATIENT)
Dept: INFECTIOUS DISEASES | Facility: CLINIC | Age: 66
End: 2021-02-09

## 2021-02-09 DIAGNOSIS — T82.9XXS LEFT VENTRICULAR ASSIST DEVICE (LVAD) COMPLICATION, SEQUELA: Primary | ICD-10-CM

## 2021-02-09 DIAGNOSIS — T82.7XXA INFECTION ASSOCIATED WITH DRIVELINE OF LEFT VENTRICULAR ASSIST DEVICE (LVAD) (H): ICD-10-CM

## 2021-02-09 RX ORDER — CEPHALEXIN 500 MG/1
500 CAPSULE ORAL 4 TIMES DAILY
Qty: 120 CAPSULE | Refills: 1 | Status: SHIPPED | OUTPATIENT
Start: 2021-02-09 | End: 2021-04-08

## 2021-02-09 NOTE — PROGRESS NOTES
Discussed case with Mervat Decker-cardiology PA.  Reviewed CT w/ radiology. Noted to have streaking along the soft tissue where the drive line traverses. This was not present on the 11/2020 CT scan. There is a fluid collection present but based on appearance does not look infectious-possibly a post op seroma (at the level of the diaphragm and mediastinum). This fluid collection was present on 11/2020 CT and is not in continuity with the soft tissue/rectus inflammation.    CBC w/ diff normal this week. Creatinine 2.26 (crcl 45). No CRP.   Will increase cephalexin to 500 mg po q 6 hours (change from prophylaxis to treatment doses).   Will follow up blood and drainage cultures (has grown MSSA in past)  IV therapy can be considered if needed in the future but this is complicated due to his insurance.   Will arrange follow up in a few weeks. Patient instructed to called with systemic signs.  Will add CRP to INR being done on Thursday (at [facility])

## 2021-02-10 DIAGNOSIS — T82.7XXA INFECTION ASSOCIATED WITH DRIVELINE OF LEFT VENTRICULAR ASSIST DEVICE (LVAD) (H): ICD-10-CM

## 2021-02-10 DIAGNOSIS — T82.9XXS LEFT VENTRICULAR ASSIST DEVICE (LVAD) COMPLICATION, SEQUELA: Primary | ICD-10-CM

## 2021-02-10 NOTE — PROGRESS NOTES
Faxed CRP order to  [facility] per Dr. Bhatti & requested that results be faxed to our clinic.  Antonette Pittman RN

## 2021-02-11 ENCOUNTER — EXTERNAL ORDER RESULTS (OUTPATIENT)
Dept: INFECTIOUS DISEASES | Facility: CLINIC | Age: 66
End: 2021-02-11

## 2021-02-11 ENCOUNTER — ANTICOAGULATION THERAPY VISIT (OUTPATIENT)
Dept: ANTICOAGULATION | Facility: CLINIC | Age: 66
End: 2021-02-11

## 2021-02-11 DIAGNOSIS — I50.22 CHRONIC SYSTOLIC CONGESTIVE HEART FAILURE (H): ICD-10-CM

## 2021-02-11 DIAGNOSIS — I48.0 PAROXYSMAL ATRIAL FIBRILLATION (H): ICD-10-CM

## 2021-02-11 DIAGNOSIS — Z95.811 LVAD (LEFT VENTRICULAR ASSIST DEVICE) PRESENT (H): ICD-10-CM

## 2021-02-11 LAB
CRP SERPL-MCNC: 8.6 MG/L (ref 0–10)
INR PPP: 2.1 (ref 0.9–1.1)

## 2021-02-11 NOTE — PROGRESS NOTES
ANTICOAGULATION FOLLOW-UP CLINIC VISIT    Patient Name:  Eliseo Tanner  Date:  2021  Contact Type:  Telephone    SUBJECTIVE:         OBJECTIVE    Recent labs: (last 7 days)     21   INR 2.1*       ASSESSMENT / PLAN  No question data found.  Anticoagulation Summary  As of 2021    INR goal:  2.0-3.0   TTR:  89.5 % (10.2 mo)   INR used for dosin.1 (2021)   Warfarin maintenance plan:  6 mg (4 mg x 1.5) every Mon; 4 mg (4 mg x 1) all other days   Full warfarin instructions:  6 mg every Mon; 4 mg all other days   Weekly warfarin total:  30 mg   No change documented:  Gloria Abbasi RN   Plan last modified:  Gloria Abbasi RN (2021)   Next INR check:  2021   Priority:  Critical   Target end date:  Indefinite    Indications    LVAD (left ventricular assist device) present (H) [Z95.811]  Chronic systolic congestive heart failure (H) [I50.22]  Paroxysmal atrial fibrillation (H) [I48.0]             Anticoagulation Episode Summary     INR check location:      Preferred lab:      Send INR reminders to:  [facility]    Comments:  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  HM 3  placed 2020, 81mg ASA, Speak to spouse, Veronique at [phone]  2020:  Lab: [facility]:  ph: [phone] opt 5;   fax: [phone]      Anticoagulation Care Providers     Provider Role Specialty Phone number    Nader Cisneros MD Referring Cardiovascular Disease [phone]            See the Encounter Report to view Anticoagulation Flowsheet and Dosing Calendar (Go to Encounters tab in chart review, and find the Anticoagulation Therapy Visit)    Spoke with Veronique.    Gloria Abbasi RN

## 2021-02-12 LAB
BACTERIA SPEC CULT: ABNORMAL
BACTERIA SPEC CULT: ABNORMAL
Lab: ABNORMAL
SPECIMEN SOURCE: ABNORMAL

## 2021-02-15 ENCOUNTER — HOSPITAL ENCOUNTER (INPATIENT)
Facility: CLINIC | Age: 66
LOS: 30 days | Discharge: HOME | DRG: 870 | End: 2021-03-17
Attending: INTERNAL MEDICINE | Admitting: INTERNAL MEDICINE
Payer: MEDICARE

## 2021-02-15 ENCOUNTER — TRANSFERRED RECORDS (OUTPATIENT)
Dept: HEALTH INFORMATION MANAGEMENT | Facility: CLINIC | Age: 66
End: 2021-02-15

## 2021-02-15 ENCOUNTER — CARE COORDINATION (OUTPATIENT)
Dept: CARDIOLOGY | Facility: CLINIC | Age: 66
End: 2021-02-15

## 2021-02-15 ENCOUNTER — APPOINTMENT (OUTPATIENT)
Dept: CT IMAGING | Facility: CLINIC | Age: 66
DRG: 870 | End: 2021-02-15
Attending: STUDENT IN AN ORGANIZED HEALTH CARE EDUCATION/TRAINING PROGRAM
Payer: MEDICARE

## 2021-02-15 ENCOUNTER — APPOINTMENT (OUTPATIENT)
Dept: GENERAL RADIOLOGY | Facility: CLINIC | Age: 66
DRG: 870 | End: 2021-02-15
Attending: STUDENT IN AN ORGANIZED HEALTH CARE EDUCATION/TRAINING PROGRAM
Payer: MEDICARE

## 2021-02-15 DIAGNOSIS — I50.22 CHRONIC SYSTOLIC CONGESTIVE HEART FAILURE (H): ICD-10-CM

## 2021-02-15 DIAGNOSIS — I42.8 NONISCHEMIC CARDIOMYOPATHY (H): ICD-10-CM

## 2021-02-15 DIAGNOSIS — I25.10 CORONARY ARTERY DISEASE INVOLVING NATIVE CORONARY ARTERY OF NATIVE HEART WITHOUT ANGINA PECTORIS: Primary | ICD-10-CM

## 2021-02-15 PROBLEM — R00.0 TACHYARRHYTHMIA: Status: ACTIVE | Noted: 2021-02-15

## 2021-02-15 LAB
ALBUMIN SERPL-MCNC: 3 G/DL (ref 3.4–5)
ALP SERPL-CCNC: 141 U/L (ref 40–150)
ALT SERPL W P-5'-P-CCNC: 768 U/L (ref 0–70)
ALT SERPL-CCNC: 111 U/L (ref 1–50)
ANION GAP SERPL CALCULATED.3IONS-SCNC: 17 MMOL/L (ref 3–14)
APTT PPP: 46 SEC (ref 22–37)
AST SERPL W P-5'-P-CCNC: 1441 U/L (ref 0–45)
AST SERPL-CCNC: 195 U/L (ref 17–59)
BACTERIA SPEC CULT: ABNORMAL
BACTERIA SPEC CULT: NORMAL
BASE DEFICIT BLDA-SCNC: 7.2 MMOL/L
BASOPHILS # BLD AUTO: 0 10E9/L (ref 0–0.2)
BASOPHILS NFR BLD AUTO: 0.1 %
BILIRUB SERPL-MCNC: 1 MG/DL (ref 0.2–1.3)
BUN SERPL-MCNC: 78 MG/DL (ref 7–30)
CALCIUM SERPL-MCNC: 8.7 MG/DL (ref 8.5–10.1)
CHLORIDE SERPL-SCNC: 97 MMOL/L (ref 94–109)
CK SERPL-CCNC: 884 U/L (ref 30–300)
CO2 SERPL-SCNC: 16 MMOL/L (ref 20–32)
CREAT SERPL-MCNC: 2.8 MG/DL (ref 0.6–1.2)
CREAT SERPL-MCNC: 3.23 MG/DL (ref 0.66–1.25)
CRP SERPL-MCNC: 270 MG/L (ref 0–8)
DIFFERENTIAL METHOD BLD: ABNORMAL
EOSINOPHIL # BLD AUTO: 0 10E9/L (ref 0–0.7)
EOSINOPHIL NFR BLD AUTO: 0 %
ERYTHROCYTE [DISTWIDTH] IN BLOOD BY AUTOMATED COUNT: 20.8 % (ref 10–15)
ERYTHROCYTE [SEDIMENTATION RATE] IN BLOOD BY WESTERGREN METHOD: 47 MM/H (ref 0–20)
FLUAV RNA RESP QL NAA+PROBE: NEGATIVE
FLUBV RNA RESP QL NAA+PROBE: NEGATIVE
GFR SERPL CREATININE-BSD FRML MDRD: 19 ML/MIN/{1.73_M2}
GFR SERPL CREATININE-BSD FRML MDRD: 24 ML/MIN
GLUCOSE BLDC GLUCOMTR-MCNC: 237 MG/DL (ref 70–99)
GLUCOSE SERPL-MCNC: 215 MG/DL (ref 70–99)
GLUCOSE SERPL-MCNC: 232 MG/DL (ref 74–106)
GRAM STN SPEC: ABNORMAL
HCO3 BLD-SCNC: 17 MMOL/L (ref 21–28)
HCT VFR BLD AUTO: 32.9 % (ref 40–53)
HGB BLD-MCNC: 9.9 G/DL (ref 13.3–17.7)
IMM GRANULOCYTES # BLD: 0.5 10E9/L (ref 0–0.4)
IMM GRANULOCYTES NFR BLD: 1.9 %
INR PPP: 1.4 (ref 2–3.5)
INR PPP: 2.13 (ref 0.86–1.14)
LABORATORY COMMENT REPORT: NORMAL
LACTATE BLD-SCNC: 4.1 MMOL/L (ref 0.7–2)
LACTATE BLD-SCNC: 6.9 MMOL/L (ref 0.7–2)
LDH SERPL L TO P-CCNC: 1750 U/L (ref 85–227)
LYMPHOCYTES # BLD AUTO: 0.3 10E9/L (ref 0.8–5.3)
LYMPHOCYTES NFR BLD AUTO: 1.1 %
Lab: ABNORMAL
Lab: NORMAL
MAGNESIUM SERPL-MCNC: 2.1 MG/DL (ref 1.6–2.3)
MCH RBC QN AUTO: 22.3 PG (ref 26.5–33)
MCHC RBC AUTO-ENTMCNC: 30.1 G/DL (ref 31.5–36.5)
MCV RBC AUTO: 74 FL (ref 78–100)
MONOCYTES # BLD AUTO: 1.3 10E9/L (ref 0–1.3)
MONOCYTES NFR BLD AUTO: 5.4 %
NEUTROPHILS # BLD AUTO: 22.4 10E9/L (ref 1.6–8.3)
NEUTROPHILS NFR BLD AUTO: 91.5 %
NRBC # BLD AUTO: 0.1 10*3/UL
NRBC BLD AUTO-RTO: 0 /100
NT-PROBNP SERPL-MCNC: ABNORMAL PG/ML (ref 0–900)
O2/TOTAL GAS SETTING VFR VENT: ABNORMAL %
PCO2 BLD: 28 MM HG (ref 35–45)
PH BLD: 7.39 PH (ref 7.35–7.45)
PHOSPHATE SERPL-MCNC: 5 MG/DL (ref 2.5–4.5)
PLATELET # BLD AUTO: 356 10E9/L (ref 150–450)
PO2 BLD: 91 MM HG (ref 80–105)
POTASSIUM SERPL-SCNC: 4.9 MMOL/L (ref 3.5–5.1)
POTASSIUM SERPL-SCNC: 5.1 MMOL/L (ref 3.4–5.3)
PROCALCITONIN SERPL-MCNC: 7.95 NG/ML
PROT SERPL-MCNC: 8.2 G/DL (ref 6.8–8.8)
RBC # BLD AUTO: 4.44 10E12/L (ref 4.4–5.9)
RSV RNA SPEC QL NAA+PROBE: NEGATIVE
SARS-COV-2 RNA RESP QL NAA+PROBE: NEGATIVE
SODIUM SERPL-SCNC: 129 MMOL/L (ref 133–144)
SPECIMEN SOURCE: ABNORMAL
SPECIMEN SOURCE: ABNORMAL
SPECIMEN SOURCE: NORMAL
SPECIMEN SOURCE: NORMAL
TROPONIN I SERPL-MCNC: 0.38 UG/L (ref 0–0.04)
URATE SERPL-MCNC: 6.8 MG/DL (ref 3.5–7.2)
WBC # BLD AUTO: 24.5 10E9/L (ref 4–11)

## 2021-02-15 PROCEDURE — 83605 ASSAY OF LACTIC ACID: CPT | Performed by: STUDENT IN AN ORGANIZED HEALTH CARE EDUCATION/TRAINING PROGRAM

## 2021-02-15 PROCEDURE — 87636 SARSCOV2 & INF A&B AMP PRB: CPT | Performed by: STUDENT IN AN ORGANIZED HEALTH CARE EDUCATION/TRAINING PROGRAM

## 2021-02-15 PROCEDURE — 250N000011 HC RX IP 250 OP 636: Performed by: INTERNAL MEDICINE

## 2021-02-15 PROCEDURE — 93010 ELECTROCARDIOGRAM REPORT: CPT | Performed by: INTERNAL MEDICINE

## 2021-02-15 PROCEDURE — 99231 SBSQ HOSP IP/OBS SF/LOW 25: CPT | Performed by: PHYSICIAN ASSISTANT

## 2021-02-15 PROCEDURE — 258N000003 HC RX IP 258 OP 636: Performed by: STUDENT IN AN ORGANIZED HEALTH CARE EDUCATION/TRAINING PROGRAM

## 2021-02-15 PROCEDURE — 84132 ASSAY OF SERUM POTASSIUM: CPT | Performed by: INTERNAL MEDICINE

## 2021-02-15 PROCEDURE — 99292 CRITICAL CARE ADDL 30 MIN: CPT | Mod: 25 | Performed by: INTERNAL MEDICINE

## 2021-02-15 PROCEDURE — 250N000013 HC RX MED GY IP 250 OP 250 PS 637: Performed by: INTERNAL MEDICINE

## 2021-02-15 PROCEDURE — 87186 SC STD MICRODIL/AGAR DIL: CPT | Performed by: STUDENT IN AN ORGANIZED HEALTH CARE EDUCATION/TRAINING PROGRAM

## 2021-02-15 PROCEDURE — 71250 CT THORAX DX C-: CPT | Mod: 26 | Performed by: RADIOLOGY

## 2021-02-15 PROCEDURE — 71045 X-RAY EXAM CHEST 1 VIEW: CPT | Mod: 26

## 2021-02-15 PROCEDURE — 250N000011 HC RX IP 250 OP 636: Performed by: STUDENT IN AN ORGANIZED HEALTH CARE EDUCATION/TRAINING PROGRAM

## 2021-02-15 PROCEDURE — 71250 CT THORAX DX C-: CPT | Mod: MG

## 2021-02-15 PROCEDURE — 82803 BLOOD GASES ANY COMBINATION: CPT | Performed by: STUDENT IN AN ORGANIZED HEALTH CARE EDUCATION/TRAINING PROGRAM

## 2021-02-15 PROCEDURE — 83615 LACTATE (LD) (LDH) ENZYME: CPT | Performed by: INTERNAL MEDICINE

## 2021-02-15 PROCEDURE — 87070 CULTURE OTHR SPECIMN AEROBIC: CPT | Performed by: STUDENT IN AN ORGANIZED HEALTH CARE EDUCATION/TRAINING PROGRAM

## 2021-02-15 PROCEDURE — 250N000013 HC RX MED GY IP 250 OP 250 PS 637: Performed by: STUDENT IN AN ORGANIZED HEALTH CARE EDUCATION/TRAINING PROGRAM

## 2021-02-15 PROCEDURE — 84550 ASSAY OF BLOOD/URIC ACID: CPT | Performed by: INTERNAL MEDICINE

## 2021-02-15 PROCEDURE — 87486 CHLMYD PNEUM DNA AMP PROBE: CPT | Performed by: STUDENT IN AN ORGANIZED HEALTH CARE EDUCATION/TRAINING PROGRAM

## 2021-02-15 PROCEDURE — G1004 CDSM NDSC: HCPCS | Mod: GC | Performed by: RADIOLOGY

## 2021-02-15 PROCEDURE — 87147 CULTURE TYPE IMMUNOLOGIC: CPT | Performed by: STUDENT IN AN ORGANIZED HEALTH CARE EDUCATION/TRAINING PROGRAM

## 2021-02-15 PROCEDURE — 82550 ASSAY OF CK (CPK): CPT | Performed by: INTERNAL MEDICINE

## 2021-02-15 PROCEDURE — 80053 COMPREHEN METABOLIC PANEL: CPT | Performed by: INTERNAL MEDICINE

## 2021-02-15 PROCEDURE — 84484 ASSAY OF TROPONIN QUANT: CPT | Performed by: INTERNAL MEDICINE

## 2021-02-15 PROCEDURE — 84100 ASSAY OF PHOSPHORUS: CPT | Performed by: INTERNAL MEDICINE

## 2021-02-15 PROCEDURE — 86140 C-REACTIVE PROTEIN: CPT | Performed by: INTERNAL MEDICINE

## 2021-02-15 PROCEDURE — 3E043XZ INTRODUCTION OF VASOPRESSOR INTO CENTRAL VEIN, PERCUTANEOUS APPROACH: ICD-10-PCS | Performed by: NURSE PRACTITIONER

## 2021-02-15 PROCEDURE — 258N000003 HC RX IP 258 OP 636: Performed by: INTERNAL MEDICINE

## 2021-02-15 PROCEDURE — 85652 RBC SED RATE AUTOMATED: CPT | Performed by: INTERNAL MEDICINE

## 2021-02-15 PROCEDURE — 87077 CULTURE AEROBIC IDENTIFY: CPT | Performed by: STUDENT IN AN ORGANIZED HEALTH CARE EDUCATION/TRAINING PROGRAM

## 2021-02-15 PROCEDURE — 83880 ASSAY OF NATRIURETIC PEPTIDE: CPT | Performed by: INTERNAL MEDICINE

## 2021-02-15 PROCEDURE — 999N001017 HC STATISTIC GLUCOSE BY METER IP

## 2021-02-15 PROCEDURE — 87040 BLOOD CULTURE FOR BACTERIA: CPT | Performed by: INTERNAL MEDICINE

## 2021-02-15 PROCEDURE — 83735 ASSAY OF MAGNESIUM: CPT | Performed by: INTERNAL MEDICINE

## 2021-02-15 PROCEDURE — 87581 M.PNEUMON DNA AMP PROBE: CPT | Performed by: STUDENT IN AN ORGANIZED HEALTH CARE EDUCATION/TRAINING PROGRAM

## 2021-02-15 PROCEDURE — 200N000002 HC R&B ICU UMMC

## 2021-02-15 PROCEDURE — 87633 RESP VIRUS 12-25 TARGETS: CPT | Performed by: STUDENT IN AN ORGANIZED HEALTH CARE EDUCATION/TRAINING PROGRAM

## 2021-02-15 PROCEDURE — 87205 SMEAR GRAM STAIN: CPT | Performed by: STUDENT IN AN ORGANIZED HEALTH CARE EDUCATION/TRAINING PROGRAM

## 2021-02-15 PROCEDURE — 999N000128 HC STATISTIC PERIPHERAL IV START W/O US GUIDANCE

## 2021-02-15 PROCEDURE — 85610 PROTHROMBIN TIME: CPT | Performed by: INTERNAL MEDICINE

## 2021-02-15 PROCEDURE — 93005 ELECTROCARDIOGRAM TRACING: CPT

## 2021-02-15 PROCEDURE — 36415 COLL VENOUS BLD VENIPUNCTURE: CPT | Performed by: INTERNAL MEDICINE

## 2021-02-15 PROCEDURE — 999N000157 HC STATISTIC RCP TIME EA 10 MIN

## 2021-02-15 PROCEDURE — 85730 THROMBOPLASTIN TIME PARTIAL: CPT | Performed by: INTERNAL MEDICINE

## 2021-02-15 PROCEDURE — 87040 BLOOD CULTURE FOR BACTERIA: CPT | Performed by: STUDENT IN AN ORGANIZED HEALTH CARE EDUCATION/TRAINING PROGRAM

## 2021-02-15 PROCEDURE — 74176 CT ABD & PELVIS W/O CONTRAST: CPT | Mod: 26 | Performed by: RADIOLOGY

## 2021-02-15 PROCEDURE — 93750 INTERROGATION VAD IN PERSON: CPT | Performed by: INTERNAL MEDICINE

## 2021-02-15 PROCEDURE — 5A1955Z RESPIRATORY VENTILATION, GREATER THAN 96 CONSECUTIVE HOURS: ICD-10-PCS | Performed by: NURSE PRACTITIONER

## 2021-02-15 PROCEDURE — 85025 COMPLETE CBC W/AUTO DIFF WBC: CPT | Performed by: INTERNAL MEDICINE

## 2021-02-15 PROCEDURE — 84145 PROCALCITONIN (PCT): CPT | Performed by: INTERNAL MEDICINE

## 2021-02-15 PROCEDURE — 99291 CRITICAL CARE FIRST HOUR: CPT | Mod: 25 | Performed by: INTERNAL MEDICINE

## 2021-02-15 PROCEDURE — 999N000065 XR CHEST PORT 1 VW

## 2021-02-15 RX ORDER — ZOLPIDEM TARTRATE 5 MG/1
5 TABLET ORAL
Status: DISCONTINUED | OUTPATIENT
Start: 2021-02-15 | End: 2021-03-17 | Stop reason: HOSPADM

## 2021-02-15 RX ORDER — AMOXICILLIN 250 MG
1 CAPSULE ORAL 2 TIMES DAILY PRN
Status: DISCONTINUED | OUTPATIENT
Start: 2021-02-15 | End: 2021-03-17 | Stop reason: HOSPADM

## 2021-02-15 RX ORDER — ASPIRIN 81 MG/1
81 TABLET ORAL DAILY
Status: DISCONTINUED | OUTPATIENT
Start: 2021-02-16 | End: 2021-02-16

## 2021-02-15 RX ORDER — PIPERACILLIN SODIUM, TAZOBACTAM SODIUM 3; .375 G/15ML; G/15ML
3.38 INJECTION, POWDER, LYOPHILIZED, FOR SOLUTION INTRAVENOUS EVERY 6 HOURS
Status: DISCONTINUED | OUTPATIENT
Start: 2021-02-15 | End: 2021-02-16

## 2021-02-15 RX ORDER — NICOTINE POLACRILEX 4 MG
15-30 LOZENGE BUCCAL
Status: DISCONTINUED | OUTPATIENT
Start: 2021-02-15 | End: 2021-02-16

## 2021-02-15 RX ORDER — LEVALBUTEROL INHALATION SOLUTION 0.63 MG/3ML
0.63 SOLUTION RESPIRATORY (INHALATION) EVERY 4 HOURS PRN
Status: DISCONTINUED | OUTPATIENT
Start: 2021-02-15 | End: 2021-03-17 | Stop reason: HOSPADM

## 2021-02-15 RX ORDER — ADENOSINE 3 MG/ML
INJECTION, SOLUTION INTRAVENOUS
Status: DISCONTINUED
Start: 2021-02-15 | End: 2021-02-15 | Stop reason: HOSPADM

## 2021-02-15 RX ORDER — DEXTROSE MONOHYDRATE 25 G/50ML
25-50 INJECTION, SOLUTION INTRAVENOUS
Status: DISCONTINUED | OUTPATIENT
Start: 2021-02-15 | End: 2021-02-16

## 2021-02-15 RX ORDER — FINASTERIDE 5 MG/1
5 TABLET, FILM COATED ORAL DAILY
Status: DISCONTINUED | OUTPATIENT
Start: 2021-02-16 | End: 2021-02-16

## 2021-02-15 RX ORDER — WARFARIN SODIUM 6 MG/1
6 TABLET ORAL
Status: COMPLETED | OUTPATIENT
Start: 2021-02-15 | End: 2021-02-15

## 2021-02-15 RX ORDER — SODIUM CHLORIDE 9 MG/ML
INJECTION, SOLUTION INTRAVENOUS CONTINUOUS
Status: DISCONTINUED | OUTPATIENT
Start: 2021-02-15 | End: 2021-02-16

## 2021-02-15 RX ORDER — HEPARIN SODIUM 10000 [USP'U]/100ML
0-5000 INJECTION, SOLUTION INTRAVENOUS CONTINUOUS
Status: DISCONTINUED | OUTPATIENT
Start: 2021-02-15 | End: 2021-02-15

## 2021-02-15 RX ORDER — HYDRALAZINE HYDROCHLORIDE 100 MG/1
100 TABLET, FILM COATED ORAL 3 TIMES DAILY
Status: DISCONTINUED | OUTPATIENT
Start: 2021-02-15 | End: 2021-02-16

## 2021-02-15 RX ORDER — FENTANYL CITRATE 50 UG/ML
INJECTION, SOLUTION INTRAMUSCULAR; INTRAVENOUS
Status: DISCONTINUED
Start: 2021-02-15 | End: 2021-02-15 | Stop reason: WASHOUT

## 2021-02-15 RX ORDER — LISINOPRIL 10 MG/1
10 TABLET ORAL DAILY
Status: DISCONTINUED | OUTPATIENT
Start: 2021-02-16 | End: 2021-02-16

## 2021-02-15 RX ORDER — ATORVASTATIN CALCIUM 20 MG/1
20 TABLET, FILM COATED ORAL DAILY
Status: DISCONTINUED | OUTPATIENT
Start: 2021-02-16 | End: 2021-02-15

## 2021-02-15 RX ORDER — ALLOPURINOL 100 MG/1
200 TABLET ORAL DAILY
Status: DISCONTINUED | OUTPATIENT
Start: 2021-02-16 | End: 2021-02-22

## 2021-02-15 RX ORDER — TAMSULOSIN HYDROCHLORIDE 0.4 MG/1
0.4 CAPSULE ORAL DAILY
Status: DISCONTINUED | OUTPATIENT
Start: 2021-02-16 | End: 2021-02-16

## 2021-02-15 RX ORDER — MAGNESIUM SULFATE 1 G/100ML
1 INJECTION INTRAVENOUS ONCE
Status: COMPLETED | OUTPATIENT
Start: 2021-02-15 | End: 2021-02-15

## 2021-02-15 RX ADMIN — MAGNESIUM SULFATE 1 G: 1 INJECTION INTRAVENOUS at 20:11

## 2021-02-15 RX ADMIN — SODIUM CHLORIDE: 9 INJECTION, SOLUTION INTRAVENOUS at 21:07

## 2021-02-15 RX ADMIN — VANCOMYCIN HYDROCHLORIDE 2000 MG: 1 INJECTION, POWDER, LYOPHILIZED, FOR SOLUTION INTRAVENOUS at 22:05

## 2021-02-15 RX ADMIN — HYDRALAZINE HYDROCHLORIDE 100 MG: 100 TABLET, FILM COATED ORAL at 21:10

## 2021-02-15 RX ADMIN — PIPERACILLIN SODIUM AND TAZOBACTAM SODIUM 3.38 G: 3; .375 INJECTION, POWDER, LYOPHILIZED, FOR SOLUTION INTRAVENOUS at 21:17

## 2021-02-15 RX ADMIN — AMIODARONE HYDROCHLORIDE 1 MG/MIN: 50 INJECTION, SOLUTION INTRAVENOUS at 20:10

## 2021-02-15 RX ADMIN — AMIODARONE HYDROCHLORIDE 150 MG: 1.5 INJECTION, SOLUTION INTRAVENOUS at 20:08

## 2021-02-15 RX ADMIN — WARFARIN SODIUM 6 MG: 6 TABLET ORAL at 21:11

## 2021-02-15 ASSESSMENT — ACTIVITIES OF DAILY LIVING (ADL): ADLS_ACUITY_SCORE: 13

## 2021-02-15 NOTE — PROGRESS NOTES
D:  Received call from pt's wife.  Pt got COVID vaccine last Thursday.  Has had low grade temp and weakness.  Slid out of the car this weekend and today fell in bathroom.  Pt is being transported by EMS to [facility].  I:  Called ED to provide resource if needed.  P:  VAD Coordinator available for questions or concerns.  Notify Cards 2 Staff.

## 2021-02-16 ENCOUNTER — APPOINTMENT (OUTPATIENT)
Dept: CARDIOLOGY | Facility: CLINIC | Age: 66
DRG: 870 | End: 2021-02-16
Attending: STUDENT IN AN ORGANIZED HEALTH CARE EDUCATION/TRAINING PROGRAM
Payer: MEDICARE

## 2021-02-16 ENCOUNTER — APPOINTMENT (OUTPATIENT)
Dept: NEUROLOGY | Facility: CLINIC | Age: 66
DRG: 870 | End: 2021-02-16
Attending: INTERNAL MEDICINE
Payer: MEDICARE

## 2021-02-16 ENCOUNTER — APPOINTMENT (OUTPATIENT)
Dept: CARDIOLOGY | Facility: CLINIC | Age: 66
DRG: 870 | End: 2021-02-16
Attending: INTERNAL MEDICINE
Payer: MEDICARE

## 2021-02-16 ENCOUNTER — APPOINTMENT (OUTPATIENT)
Dept: GENERAL RADIOLOGY | Facility: CLINIC | Age: 66
DRG: 870 | End: 2021-02-16
Attending: STUDENT IN AN ORGANIZED HEALTH CARE EDUCATION/TRAINING PROGRAM
Payer: MEDICARE

## 2021-02-16 ENCOUNTER — ANCILLARY PROCEDURE (OUTPATIENT)
Dept: CARDIOLOGY | Facility: CLINIC | Age: 66
DRG: 870 | End: 2021-02-16
Attending: NURSE PRACTITIONER
Payer: MEDICARE

## 2021-02-16 ENCOUNTER — APPOINTMENT (OUTPATIENT)
Dept: CT IMAGING | Facility: CLINIC | Age: 66
DRG: 870 | End: 2021-02-16
Attending: INTERNAL MEDICINE
Payer: MEDICARE

## 2021-02-16 ENCOUNTER — APPOINTMENT (OUTPATIENT)
Dept: GENERAL RADIOLOGY | Facility: CLINIC | Age: 66
DRG: 870 | End: 2021-02-16
Attending: INTERNAL MEDICINE
Payer: MEDICARE

## 2021-02-16 ENCOUNTER — ANESTHESIA EVENT (OUTPATIENT)
Dept: INTENSIVE CARE | Facility: CLINIC | Age: 66
DRG: 870 | End: 2021-02-16
Payer: MEDICARE

## 2021-02-16 ENCOUNTER — ANESTHESIA (OUTPATIENT)
Dept: INTENSIVE CARE | Facility: CLINIC | Age: 66
DRG: 870 | End: 2021-02-16
Payer: MEDICARE

## 2021-02-16 DIAGNOSIS — Z11.59 ENCOUNTER FOR SCREENING FOR OTHER VIRAL DISEASES: ICD-10-CM

## 2021-02-16 LAB
ALBUMIN SERPL-MCNC: 2.4 G/DL (ref 3.4–5)
ALBUMIN SERPL-MCNC: 2.4 G/DL (ref 3.4–5)
ALBUMIN SERPL-MCNC: 2.5 G/DL (ref 3.4–5)
ALBUMIN SERPL-MCNC: 2.5 G/DL (ref 3.4–5)
ALBUMIN SERPL-MCNC: 2.8 G/DL (ref 3.4–5)
ALBUMIN UR-MCNC: 300 MG/DL
ALP SERPL-CCNC: 121 U/L (ref 40–150)
ALP SERPL-CCNC: 128 U/L (ref 40–150)
ALP SERPL-CCNC: 133 U/L (ref 40–150)
ALP SERPL-CCNC: 134 U/L (ref 40–150)
ALP SERPL-CCNC: 137 U/L (ref 40–150)
ALT SERPL W P-5'-P-CCNC: 2562 U/L (ref 0–70)
ALT SERPL W P-5'-P-CCNC: 3496 U/L (ref 0–70)
ALT SERPL W P-5'-P-CCNC: 3764 U/L (ref 0–70)
ALT SERPL W P-5'-P-CCNC: 4165 U/L (ref 0–70)
ALT SERPL W P-5'-P-CCNC: 4261 U/L (ref 0–70)
ANION GAP SERPL CALCULATED.3IONS-SCNC: 12 MMOL/L (ref 3–14)
ANION GAP SERPL CALCULATED.3IONS-SCNC: 14 MMOL/L (ref 3–14)
ANION GAP SERPL CALCULATED.3IONS-SCNC: 14 MMOL/L (ref 3–14)
ANION GAP SERPL CALCULATED.3IONS-SCNC: 15 MMOL/L (ref 3–14)
ANION GAP SERPL CALCULATED.3IONS-SCNC: 8 MMOL/L (ref 3–14)
APPEARANCE UR: ABNORMAL
APTT PPP: 52 SEC (ref 22–37)
APTT PPP: 67 SEC (ref 22–37)
AST SERPL W P-5'-P-CCNC: 6019 U/L (ref 0–45)
AST SERPL W P-5'-P-CCNC: 8490 U/L (ref 0–45)
AST SERPL W P-5'-P-CCNC: 8512 U/L (ref 0–45)
AST SERPL W P-5'-P-CCNC: 9584 U/L (ref 0–45)
AST SERPL W P-5'-P-CCNC: 9965 U/L (ref 0–45)
BASE DEFICIT BLDA-SCNC: 11.5 MMOL/L
BASE DEFICIT BLDA-SCNC: 2.4 MMOL/L
BASE DEFICIT BLDA-SCNC: 3.3 MMOL/L
BASE DEFICIT BLDA-SCNC: 6.1 MMOL/L
BASE DEFICIT BLDA-SCNC: 8.1 MMOL/L
BASE DEFICIT BLDA-SCNC: 8.4 MMOL/L
BASE DEFICIT BLDA-SCNC: 9.7 MMOL/L
BASE DEFICIT BLDV-SCNC: 11.9 MMOL/L
BASE DEFICIT BLDV-SCNC: 2.9 MMOL/L
BASE DEFICIT BLDV-SCNC: 3.7 MMOL/L
BASE DEFICIT BLDV-SCNC: 5.1 MMOL/L
BASE DEFICIT BLDV-SCNC: 6.1 MMOL/L
BASE DEFICIT BLDV-SCNC: 6.1 MMOL/L
BASOPHILS # BLD AUTO: 0 10E9/L (ref 0–0.2)
BASOPHILS NFR BLD AUTO: 0.2 %
BILIRUB SERPL-MCNC: 1 MG/DL (ref 0.2–1.3)
BILIRUB SERPL-MCNC: 1 MG/DL (ref 0.2–1.3)
BILIRUB SERPL-MCNC: 1.1 MG/DL (ref 0.2–1.3)
BILIRUB SERPL-MCNC: 1.1 MG/DL (ref 0.2–1.3)
BILIRUB SERPL-MCNC: 1.3 MG/DL (ref 0.2–1.3)
BILIRUB UR QL STRIP: NEGATIVE
BUN SERPL-MCNC: 71 MG/DL (ref 7–30)
BUN SERPL-MCNC: 82 MG/DL (ref 7–30)
BUN SERPL-MCNC: 84 MG/DL (ref 7–30)
BUN SERPL-MCNC: 94 MG/DL (ref 7–30)
BUN SERPL-MCNC: 96 MG/DL (ref 7–30)
BUN SERPL-MCNC: 96 MG/DL (ref 7–30)
C PNEUM DNA SPEC QL NAA+PROBE: NOT DETECTED
CA-I BLD-MCNC: 3.6 MG/DL (ref 4.4–5.2)
CA-I BLD-MCNC: 5.7 MG/DL (ref 4.4–5.2)
CALCIUM SERPL-MCNC: 10.7 MG/DL (ref 8.5–10.1)
CALCIUM SERPL-MCNC: 7.3 MG/DL (ref 8.5–10.1)
CALCIUM SERPL-MCNC: 7.5 MG/DL (ref 8.5–10.1)
CALCIUM SERPL-MCNC: 7.9 MG/DL (ref 8.5–10.1)
CALCIUM SERPL-MCNC: 8 MG/DL (ref 8.5–10.1)
CHLORIDE SERPL-SCNC: 101 MMOL/L (ref 94–109)
CHLORIDE SERPL-SCNC: 105 MMOL/L (ref 94–109)
CHLORIDE SERPL-SCNC: 94 MMOL/L (ref 94–109)
CHLORIDE SERPL-SCNC: 96 MMOL/L (ref 94–109)
CHLORIDE SERPL-SCNC: 97 MMOL/L (ref 94–109)
CO2 SERPL-SCNC: 17 MMOL/L (ref 20–32)
CO2 SERPL-SCNC: 19 MMOL/L (ref 20–32)
CO2 SERPL-SCNC: 21 MMOL/L (ref 20–32)
CO2 SERPL-SCNC: 22 MMOL/L (ref 20–32)
CO2 SERPL-SCNC: 23 MMOL/L (ref 20–32)
COLOR UR AUTO: YELLOW
CREAT SERPL-MCNC: 3.78 MG/DL (ref 0.66–1.25)
CREAT SERPL-MCNC: 4.08 MG/DL (ref 0.66–1.25)
CREAT SERPL-MCNC: 4.33 MG/DL (ref 0.66–1.25)
CREAT SERPL-MCNC: 4.5 MG/DL (ref 0.66–1.25)
CREAT SERPL-MCNC: 4.62 MG/DL (ref 0.66–1.25)
CREAT SERPL-MCNC: 4.92 MG/DL (ref 0.66–1.25)
DIFFERENTIAL METHOD BLD: ABNORMAL
EOSINOPHIL # BLD AUTO: 0 10E9/L (ref 0–0.7)
EOSINOPHIL NFR BLD AUTO: 0 %
ERYTHROCYTE [DISTWIDTH] IN BLOOD BY AUTOMATED COUNT: 20.3 % (ref 10–15)
ERYTHROCYTE [DISTWIDTH] IN BLOOD BY AUTOMATED COUNT: 20.7 % (ref 10–15)
ERYTHROCYTE [SEDIMENTATION RATE] IN BLOOD BY WESTERGREN METHOD: 72 MM/H (ref 0–20)
FLUAV H1 2009 PAND RNA SPEC QL NAA+PROBE: NOT DETECTED
FLUAV H1 RNA SPEC QL NAA+PROBE: NOT DETECTED
FLUAV H3 RNA SPEC QL NAA+PROBE: NOT DETECTED
FLUAV RNA SPEC QL NAA+PROBE: NOT DETECTED
FLUBV RNA SPEC QL NAA+PROBE: NOT DETECTED
GFR SERPL CREATININE-BSD FRML MDRD: 11 ML/MIN/{1.73_M2}
GFR SERPL CREATININE-BSD FRML MDRD: 12 ML/MIN/{1.73_M2}
GFR SERPL CREATININE-BSD FRML MDRD: 13 ML/MIN/{1.73_M2}
GFR SERPL CREATININE-BSD FRML MDRD: 13 ML/MIN/{1.73_M2}
GFR SERPL CREATININE-BSD FRML MDRD: 14 ML/MIN/{1.73_M2}
GFR SERPL CREATININE-BSD FRML MDRD: 15 ML/MIN/{1.73_M2}
GLUCOSE BLDC GLUCOMTR-MCNC: 100 MG/DL (ref 70–99)
GLUCOSE BLDC GLUCOMTR-MCNC: 110 MG/DL (ref 70–99)
GLUCOSE BLDC GLUCOMTR-MCNC: 147 MG/DL (ref 70–99)
GLUCOSE BLDC GLUCOMTR-MCNC: 164 MG/DL (ref 70–99)
GLUCOSE BLDC GLUCOMTR-MCNC: 194 MG/DL (ref 70–99)
GLUCOSE BLDC GLUCOMTR-MCNC: 199 MG/DL (ref 70–99)
GLUCOSE BLDC GLUCOMTR-MCNC: 211 MG/DL (ref 70–99)
GLUCOSE BLDC GLUCOMTR-MCNC: 226 MG/DL (ref 70–99)
GLUCOSE BLDC GLUCOMTR-MCNC: 230 MG/DL (ref 70–99)
GLUCOSE BLDC GLUCOMTR-MCNC: 231 MG/DL (ref 70–99)
GLUCOSE BLDC GLUCOMTR-MCNC: 79 MG/DL (ref 70–99)
GLUCOSE BLDC GLUCOMTR-MCNC: 84 MG/DL (ref 70–99)
GLUCOSE BLDC GLUCOMTR-MCNC: 89 MG/DL (ref 70–99)
GLUCOSE BLDC GLUCOMTR-MCNC: 93 MG/DL (ref 70–99)
GLUCOSE BLDC GLUCOMTR-MCNC: 95 MG/DL (ref 70–99)
GLUCOSE SERPL-MCNC: 100 MG/DL (ref 70–99)
GLUCOSE SERPL-MCNC: 105 MG/DL (ref 70–99)
GLUCOSE SERPL-MCNC: 210 MG/DL (ref 70–99)
GLUCOSE SERPL-MCNC: 212 MG/DL (ref 70–99)
GLUCOSE SERPL-MCNC: 88 MG/DL (ref 70–99)
GLUCOSE UR STRIP-MCNC: 30 MG/DL
GRAM STN SPEC: ABNORMAL
GRAM STN SPEC: ABNORMAL
GRAN CASTS #/AREA URNS LPF: 12 /LPF
HADV DNA SPEC QL NAA+PROBE: NOT DETECTED
HAPTOGLOB SERPL-MCNC: 314 MG/DL (ref 32–197)
HCO3 BLD-SCNC: 16 MMOL/L (ref 21–28)
HCO3 BLD-SCNC: 16 MMOL/L (ref 21–28)
HCO3 BLD-SCNC: 17 MMOL/L (ref 21–28)
HCO3 BLD-SCNC: 18 MMOL/L (ref 21–28)
HCO3 BLD-SCNC: 19 MMOL/L (ref 21–28)
HCO3 BLD-SCNC: 22 MMOL/L (ref 21–28)
HCO3 BLD-SCNC: 23 MMOL/L (ref 21–28)
HCO3 BLDV-SCNC: 16 MMOL/L (ref 21–28)
HCO3 BLDV-SCNC: 20 MMOL/L (ref 21–28)
HCO3 BLDV-SCNC: 20 MMOL/L (ref 21–28)
HCO3 BLDV-SCNC: 22 MMOL/L (ref 21–28)
HCO3 BLDV-SCNC: 22 MMOL/L (ref 21–28)
HCO3 BLDV-SCNC: 23 MMOL/L (ref 21–28)
HCOV PNL SPEC NAA+PROBE: NOT DETECTED
HCT VFR BLD AUTO: 29.7 % (ref 40–53)
HCT VFR BLD AUTO: 31.1 % (ref 40–53)
HGB BLD-MCNC: 9.2 G/DL (ref 13.3–17.7)
HGB BLD-MCNC: 9.6 G/DL (ref 13.3–17.7)
HGB FREE PLAS-MCNC: 30 MG/DL
HGB UR QL STRIP: ABNORMAL
HMPV RNA SPEC QL NAA+PROBE: NOT DETECTED
HPIV1 RNA SPEC QL NAA+PROBE: NOT DETECTED
HPIV2 RNA SPEC QL NAA+PROBE: NOT DETECTED
HPIV3 RNA SPEC QL NAA+PROBE: NOT DETECTED
HPIV4 RNA SPEC QL NAA+PROBE: NOT DETECTED
HYALINE CASTS #/AREA URNS LPF: 12 /LPF (ref 0–2)
IMM GRANULOCYTES # BLD: 0.3 10E9/L (ref 0–0.4)
IMM GRANULOCYTES NFR BLD: 1.2 %
INR PPP: 2.55 (ref 0.86–1.14)
INTERPRETATION ECG - MUSE: NORMAL
KETONES UR STRIP-MCNC: NEGATIVE MG/DL
L PNEUMO1 AG UR QL IA: ABNORMAL
L PNEUMO1 AG UR QL IA: ABNORMAL
LACTATE BLD-SCNC: 1.2 MMOL/L (ref 0.7–2)
LACTATE BLD-SCNC: 1.7 MMOL/L (ref 0.7–2)
LACTATE BLD-SCNC: 1.8 MMOL/L (ref 0.7–2)
LACTATE BLD-SCNC: 1.9 MMOL/L (ref 0.7–2)
LACTATE BLD-SCNC: 3.5 MMOL/L (ref 0.7–2)
LACTATE BLD-SCNC: 4.7 MMOL/L (ref 0.7–2)
LDH SERPL L TO P-CCNC: 4427 U/L (ref 85–227)
LDH SERPL L TO P-CCNC: 4965 U/L (ref 85–227)
LDH SERPL L TO P-CCNC: 7488 U/L (ref 85–227)
LDH SERPL L TO P-CCNC: 8531 U/L (ref 85–227)
LEUKOCYTE ESTERASE UR QL STRIP: NEGATIVE
LYMPHOCYTES # BLD AUTO: 0.4 10E9/L (ref 0.8–5.3)
LYMPHOCYTES NFR BLD AUTO: 1.8 %
Lab: ABNORMAL
M PNEUMO DNA SPEC QL NAA+PROBE: NOT DETECTED
MAGNESIUM SERPL-MCNC: 2.2 MG/DL (ref 1.6–2.3)
MAGNESIUM SERPL-MCNC: 2.3 MG/DL (ref 1.6–2.3)
MCH RBC QN AUTO: 22.3 PG (ref 26.5–33)
MCH RBC QN AUTO: 22.6 PG (ref 26.5–33)
MCHC RBC AUTO-ENTMCNC: 30.9 G/DL (ref 31.5–36.5)
MCHC RBC AUTO-ENTMCNC: 31 G/DL (ref 31.5–36.5)
MCV RBC AUTO: 72 FL (ref 78–100)
MCV RBC AUTO: 73 FL (ref 78–100)
MICROBIOLOGIST REVIEW: NORMAL
MONOCYTES # BLD AUTO: 1.1 10E9/L (ref 0–1.3)
MONOCYTES NFR BLD AUTO: 4.6 %
MRSA DNA SPEC QL NAA+PROBE: NEGATIVE
MUCOUS THREADS #/AREA URNS LPF: PRESENT /LPF
NEUTROPHILS # BLD AUTO: 22.4 10E9/L (ref 1.6–8.3)
NEUTROPHILS NFR BLD AUTO: 92.2 %
NITRATE UR QL: NEGATIVE
NRBC # BLD AUTO: 0.1 10*3/UL
NRBC BLD AUTO-RTO: 0 /100
O2/TOTAL GAS SETTING VFR VENT: 100 %
O2/TOTAL GAS SETTING VFR VENT: 50 %
O2/TOTAL GAS SETTING VFR VENT: 70 %
O2/TOTAL GAS SETTING VFR VENT: 70 %
O2/TOTAL GAS SETTING VFR VENT: ABNORMAL %
OXYHGB MFR BLD: 96 % (ref 92–100)
OXYHGB MFR BLD: 98 % (ref 92–100)
OXYHGB MFR BLD: 98 % (ref 92–100)
OXYHGB MFR BLDV: 45 %
OXYHGB MFR BLDV: 47 %
OXYHGB MFR BLDV: 52 %
OXYHGB MFR BLDV: 53 %
OXYHGB MFR BLDV: 53 %
OXYHGB MFR BLDV: 64 %
PCO2 BLD: 27 MM HG (ref 35–45)
PCO2 BLD: 32 MM HG (ref 35–45)
PCO2 BLD: 38 MM HG (ref 35–45)
PCO2 BLD: 39 MM HG (ref 35–45)
PCO2 BLD: 39 MM HG (ref 35–45)
PCO2 BLD: 40 MM HG (ref 35–45)
PCO2 BLD: 43 MM HG (ref 35–45)
PCO2 BLDV: 39 MM HG (ref 40–50)
PCO2 BLDV: 42 MM HG (ref 40–50)
PCO2 BLDV: 47 MM HG (ref 40–50)
PCO2 BLDV: 54 MM HG (ref 40–50)
PH BLD: 7.21 PH (ref 7.35–7.45)
PH BLD: 7.25 PH (ref 7.35–7.45)
PH BLD: 7.29 PH (ref 7.35–7.45)
PH BLD: 7.34 PH (ref 7.35–7.45)
PH BLD: 7.35 PH (ref 7.35–7.45)
PH BLD: 7.37 PH (ref 7.35–7.45)
PH BLD: 7.38 PH (ref 7.35–7.45)
PH BLDV: 7.18 PH (ref 7.32–7.43)
PH BLDV: 7.21 PH (ref 7.32–7.43)
PH BLDV: 7.29 PH (ref 7.32–7.43)
PH BLDV: 7.31 PH (ref 7.32–7.43)
PH BLDV: 7.33 PH (ref 7.32–7.43)
PH BLDV: 7.33 PH (ref 7.32–7.43)
PH UR STRIP: 5.5 PH (ref 5–7)
PHOSPHATE SERPL-MCNC: 6.9 MG/DL (ref 2.5–4.5)
PLATELET # BLD AUTO: 264 10E9/L (ref 150–450)
PLATELET # BLD AUTO: 308 10E9/L (ref 150–450)
PO2 BLD: 126 MM HG (ref 80–105)
PO2 BLD: 131 MM HG (ref 80–105)
PO2 BLD: 162 MM HG (ref 80–105)
PO2 BLD: 170 MM HG (ref 80–105)
PO2 BLD: 180 MM HG (ref 80–105)
PO2 BLD: 242 MM HG (ref 80–105)
PO2 BLD: 99 MM HG (ref 80–105)
PO2 BLDV: 33 MM HG (ref 25–47)
PO2 BLDV: 34 MM HG (ref 25–47)
PO2 BLDV: 35 MM HG (ref 25–47)
PO2 BLDV: 36 MM HG (ref 25–47)
PO2 BLDV: 41 MM HG (ref 25–47)
PO2 BLDV: 47 MM HG (ref 25–47)
POTASSIUM SERPL-SCNC: 4.3 MMOL/L (ref 3.4–5.3)
POTASSIUM SERPL-SCNC: 4.5 MMOL/L (ref 3.4–5.3)
POTASSIUM SERPL-SCNC: 4.5 MMOL/L (ref 3.4–5.3)
POTASSIUM SERPL-SCNC: 5 MMOL/L (ref 3.4–5.3)
POTASSIUM SERPL-SCNC: 5.1 MMOL/L (ref 3.4–5.3)
PROT SERPL-MCNC: 6.5 G/DL (ref 6.8–8.8)
PROT SERPL-MCNC: 6.9 G/DL (ref 6.8–8.8)
PROT SERPL-MCNC: 7 G/DL (ref 6.8–8.8)
PROT SERPL-MCNC: 7 G/DL (ref 6.8–8.8)
PROT SERPL-MCNC: 7.6 G/DL (ref 6.8–8.8)
RADIOLOGIST FLAGS: ABNORMAL
RBC # BLD AUTO: 4.13 10E12/L (ref 4.4–5.9)
RBC # BLD AUTO: 4.24 10E12/L (ref 4.4–5.9)
RBC #/AREA URNS AUTO: 6 /HPF (ref 0–2)
RSV RNA SPEC QL NAA+PROBE: NOT DETECTED
RSV RNA SPEC QL NAA+PROBE: NOT DETECTED
RV+EV RNA SPEC QL NAA+PROBE: NOT DETECTED
S PNEUM AG SPEC QL: NORMAL
SODIUM SERPL-SCNC: 126 MMOL/L (ref 133–144)
SODIUM SERPL-SCNC: 129 MMOL/L (ref 133–144)
SODIUM SERPL-SCNC: 132 MMOL/L (ref 133–144)
SODIUM SERPL-SCNC: 135 MMOL/L (ref 133–144)
SODIUM SERPL-SCNC: 136 MMOL/L (ref 133–144)
SOURCE: ABNORMAL
SP GR UR STRIP: 1.02 (ref 1–1.03)
SPECIMEN SOURCE: ABNORMAL
SPECIMEN SOURCE: ABNORMAL
SPECIMEN SOURCE: NORMAL
SPECIMEN SOURCE: NORMAL
UROBILINOGEN UR STRIP-MCNC: NORMAL MG/DL (ref 0–2)
WBC # BLD AUTO: 19.4 10E9/L (ref 4–11)
WBC # BLD AUTO: 24.2 10E9/L (ref 4–11)
WBC #/AREA URNS AUTO: 0 /HPF (ref 0–5)

## 2021-02-16 PROCEDURE — G1004 CDSM NDSC: HCPCS | Mod: GC | Performed by: RADIOLOGY

## 2021-02-16 PROCEDURE — 87081 CULTURE SCREEN ONLY: CPT | Performed by: STUDENT IN AN ORGANIZED HEALTH CARE EDUCATION/TRAINING PROGRAM

## 2021-02-16 PROCEDURE — 85730 THROMBOPLASTIN TIME PARTIAL: CPT | Performed by: STUDENT IN AN ORGANIZED HEALTH CARE EDUCATION/TRAINING PROGRAM

## 2021-02-16 PROCEDURE — 999N000065 XR CHEST PORT 1 VW

## 2021-02-16 PROCEDURE — 250N000011 HC RX IP 250 OP 636

## 2021-02-16 PROCEDURE — 70450 CT HEAD/BRAIN W/O DYE: CPT | Mod: 26 | Performed by: RADIOLOGY

## 2021-02-16 PROCEDURE — 999N000157 HC STATISTIC RCP TIME EA 10 MIN

## 2021-02-16 PROCEDURE — 93010 ELECTROCARDIOGRAM REPORT: CPT | Performed by: INTERNAL MEDICINE

## 2021-02-16 PROCEDURE — 250N000011 HC RX IP 250 OP 636: Performed by: INTERNAL MEDICINE

## 2021-02-16 PROCEDURE — 250N000011 HC RX IP 250 OP 636: Performed by: STUDENT IN AN ORGANIZED HEALTH CARE EDUCATION/TRAINING PROGRAM

## 2021-02-16 PROCEDURE — 999N000045 HC STATISTIC DAILY SWAN MONITORING

## 2021-02-16 PROCEDURE — 93308 TTE F-UP OR LMTD: CPT | Mod: 26 | Performed by: INTERNAL MEDICINE

## 2021-02-16 PROCEDURE — 80053 COMPREHEN METABOLIC PANEL: CPT | Performed by: STUDENT IN AN ORGANIZED HEALTH CARE EDUCATION/TRAINING PROGRAM

## 2021-02-16 PROCEDURE — 90947 DIALYSIS REPEATED EVAL: CPT

## 2021-02-16 PROCEDURE — 87070 CULTURE OTHR SPECIMN AEROBIC: CPT | Performed by: STUDENT IN AN ORGANIZED HEALTH CARE EDUCATION/TRAINING PROGRAM

## 2021-02-16 PROCEDURE — 74176 CT ABD & PELVIS W/O CONTRAST: CPT | Mod: 26 | Performed by: RADIOLOGY

## 2021-02-16 PROCEDURE — 95720 EEG PHY/QHP EA INCR W/VEEG: CPT | Mod: GC | Performed by: PSYCHIATRY & NEUROLOGY

## 2021-02-16 PROCEDURE — 86140 C-REACTIVE PROTEIN: CPT | Performed by: INTERNAL MEDICINE

## 2021-02-16 PROCEDURE — 87186 SC STD MICRODIL/AGAR DIL: CPT | Performed by: STUDENT IN AN ORGANIZED HEALTH CARE EDUCATION/TRAINING PROGRAM

## 2021-02-16 PROCEDURE — 87899 AGENT NOS ASSAY W/OPTIC: CPT | Performed by: STUDENT IN AN ORGANIZED HEALTH CARE EDUCATION/TRAINING PROGRAM

## 2021-02-16 PROCEDURE — 99223 1ST HOSP IP/OBS HIGH 75: CPT | Mod: GC | Performed by: INTERNAL MEDICINE

## 2021-02-16 PROCEDURE — 93282 PRGRMG EVAL IMPLANTABLE DFB: CPT | Mod: 26 | Performed by: INTERNAL MEDICINE

## 2021-02-16 PROCEDURE — 999N000155 HC STATISTIC RAPCV CVP MONITORING

## 2021-02-16 PROCEDURE — 272N000012 HC KIT CATH SWAN VIP SUPPLY

## 2021-02-16 PROCEDURE — 93325 DOPPLER ECHO COLOR FLOW MAPG: CPT | Mod: 26 | Performed by: INTERNAL MEDICINE

## 2021-02-16 PROCEDURE — 85025 COMPLETE CBC W/AUTO DIFF WBC: CPT | Performed by: STUDENT IN AN ORGANIZED HEALTH CARE EDUCATION/TRAINING PROGRAM

## 2021-02-16 PROCEDURE — 258N000003 HC RX IP 258 OP 636: Performed by: STUDENT IN AN ORGANIZED HEALTH CARE EDUCATION/TRAINING PROGRAM

## 2021-02-16 PROCEDURE — 5A1D90Z PERFORMANCE OF URINARY FILTRATION, CONTINUOUS, GREATER THAN 18 HOURS PER DAY: ICD-10-PCS | Performed by: NURSE PRACTITIONER

## 2021-02-16 PROCEDURE — 87077 CULTURE AEROBIC IDENTIFY: CPT | Performed by: STUDENT IN AN ORGANIZED HEALTH CARE EDUCATION/TRAINING PROGRAM

## 2021-02-16 PROCEDURE — 83051 HEMOGLOBIN PLASMA: CPT | Performed by: STUDENT IN AN ORGANIZED HEALTH CARE EDUCATION/TRAINING PROGRAM

## 2021-02-16 PROCEDURE — 85652 RBC SED RATE AUTOMATED: CPT | Performed by: INTERNAL MEDICINE

## 2021-02-16 PROCEDURE — 70450 CT HEAD/BRAIN W/O DYE: CPT | Mod: ME

## 2021-02-16 PROCEDURE — 99292 CRITICAL CARE ADDL 30 MIN: CPT | Mod: 25 | Performed by: INTERNAL MEDICINE

## 2021-02-16 PROCEDURE — 999N000011 HC STATISTIC ANESTHESIA CASE

## 2021-02-16 PROCEDURE — 71045 X-RAY EXAM CHEST 1 VIEW: CPT | Mod: 26 | Performed by: RADIOLOGY

## 2021-02-16 PROCEDURE — 999N001017 HC STATISTIC GLUCOSE BY METER IP

## 2021-02-16 PROCEDURE — 200N000002 HC R&B ICU UMMC

## 2021-02-16 PROCEDURE — 71250 CT THORAX DX C-: CPT | Mod: 26 | Performed by: RADIOLOGY

## 2021-02-16 PROCEDURE — 87640 STAPH A DNA AMP PROBE: CPT | Performed by: STUDENT IN AN ORGANIZED HEALTH CARE EDUCATION/TRAINING PROGRAM

## 2021-02-16 PROCEDURE — 82805 BLOOD GASES W/O2 SATURATION: CPT | Performed by: STUDENT IN AN ORGANIZED HEALTH CARE EDUCATION/TRAINING PROGRAM

## 2021-02-16 PROCEDURE — 95714 VEEG EA 12-26 HR UNMNTR: CPT

## 2021-02-16 PROCEDURE — 74018 RADEX ABDOMEN 1 VIEW: CPT | Mod: 26 | Performed by: RADIOLOGY

## 2021-02-16 PROCEDURE — 83735 ASSAY OF MAGNESIUM: CPT | Performed by: INTERNAL MEDICINE

## 2021-02-16 PROCEDURE — 82803 BLOOD GASES ANY COMBINATION: CPT | Performed by: STUDENT IN AN ORGANIZED HEALTH CARE EDUCATION/TRAINING PROGRAM

## 2021-02-16 PROCEDURE — 999N000015 HC STATISTIC ARTERIAL MONITORING DAILY

## 2021-02-16 PROCEDURE — 87641 MR-STAPH DNA AMP PROBE: CPT | Performed by: STUDENT IN AN ORGANIZED HEALTH CARE EDUCATION/TRAINING PROGRAM

## 2021-02-16 PROCEDURE — 81001 URINALYSIS AUTO W/SCOPE: CPT | Performed by: INTERNAL MEDICINE

## 2021-02-16 PROCEDURE — 85027 COMPLETE CBC AUTOMATED: CPT | Performed by: INTERNAL MEDICINE

## 2021-02-16 PROCEDURE — 250N000009 HC RX 250: Performed by: STUDENT IN AN ORGANIZED HEALTH CARE EDUCATION/TRAINING PROGRAM

## 2021-02-16 PROCEDURE — 93005 ELECTROCARDIOGRAM TRACING: CPT

## 2021-02-16 PROCEDURE — 82810 BLOOD GASES O2 SAT ONLY: CPT | Performed by: STUDENT IN AN ORGANIZED HEALTH CARE EDUCATION/TRAINING PROGRAM

## 2021-02-16 PROCEDURE — 93750 INTERROGATION VAD IN PERSON: CPT | Performed by: INTERNAL MEDICINE

## 2021-02-16 PROCEDURE — 250N000009 HC RX 250: Performed by: INTERNAL MEDICINE

## 2021-02-16 PROCEDURE — 99223 1ST HOSP IP/OBS HIGH 75: CPT | Performed by: INTERNAL MEDICINE

## 2021-02-16 PROCEDURE — 250N000011 HC RX IP 250 OP 636: Performed by: NURSE ANESTHETIST, CERTIFIED REGISTERED

## 2021-02-16 PROCEDURE — 250N000012 HC RX MED GY IP 250 OP 636 PS 637: Performed by: STUDENT IN AN ORGANIZED HEALTH CARE EDUCATION/TRAINING PROGRAM

## 2021-02-16 PROCEDURE — 83615 LACTATE (LD) (LDH) ENZYME: CPT | Performed by: STUDENT IN AN ORGANIZED HEALTH CARE EDUCATION/TRAINING PROGRAM

## 2021-02-16 PROCEDURE — 80053 COMPREHEN METABOLIC PANEL: CPT | Performed by: INTERNAL MEDICINE

## 2021-02-16 PROCEDURE — 93503 INSERT/PLACE HEART CATHETER: CPT

## 2021-02-16 PROCEDURE — 258N000003 HC RX IP 258 OP 636: Performed by: INTERNAL MEDICINE

## 2021-02-16 PROCEDURE — 99221 1ST HOSP IP/OBS SF/LOW 40: CPT | Mod: 25 | Performed by: INTERNAL MEDICINE

## 2021-02-16 PROCEDURE — 250N000013 HC RX MED GY IP 250 OP 250 PS 637: Performed by: STUDENT IN AN ORGANIZED HEALTH CARE EDUCATION/TRAINING PROGRAM

## 2021-02-16 PROCEDURE — 71045 X-RAY EXAM CHEST 1 VIEW: CPT

## 2021-02-16 PROCEDURE — 82330 ASSAY OF CALCIUM: CPT | Performed by: STUDENT IN AN ORGANIZED HEALTH CARE EDUCATION/TRAINING PROGRAM

## 2021-02-16 PROCEDURE — 999N000065 XR ABDOMEN PORT 1 VW

## 2021-02-16 PROCEDURE — 85610 PROTHROMBIN TIME: CPT | Performed by: STUDENT IN AN ORGANIZED HEALTH CARE EDUCATION/TRAINING PROGRAM

## 2021-02-16 PROCEDURE — 83010 ASSAY OF HAPTOGLOBIN QUANT: CPT | Performed by: STUDENT IN AN ORGANIZED HEALTH CARE EDUCATION/TRAINING PROGRAM

## 2021-02-16 PROCEDURE — 83615 LACTATE (LD) (LDH) ENZYME: CPT | Performed by: INTERNAL MEDICINE

## 2021-02-16 PROCEDURE — 83605 ASSAY OF LACTIC ACID: CPT | Performed by: STUDENT IN AN ORGANIZED HEALTH CARE EDUCATION/TRAINING PROGRAM

## 2021-02-16 PROCEDURE — 93282 PRGRMG EVAL IMPLANTABLE DFB: CPT

## 2021-02-16 PROCEDURE — 83735 ASSAY OF MAGNESIUM: CPT | Performed by: STUDENT IN AN ORGANIZED HEALTH CARE EDUCATION/TRAINING PROGRAM

## 2021-02-16 PROCEDURE — 93325 DOPPLER ECHO COLOR FLOW MAPG: CPT

## 2021-02-16 PROCEDURE — 71250 CT THORAX DX C-: CPT | Mod: MG

## 2021-02-16 PROCEDURE — 999N000185 HC STATISTIC TRANSPORT TIME EA 15 MIN

## 2021-02-16 PROCEDURE — 258N000003 HC RX IP 258 OP 636: Performed by: NURSE ANESTHETIST, CERTIFIED REGISTERED

## 2021-02-16 PROCEDURE — 99291 CRITICAL CARE FIRST HOUR: CPT | Mod: 25 | Performed by: INTERNAL MEDICINE

## 2021-02-16 PROCEDURE — 93321 DOPPLER ECHO F-UP/LMTD STD: CPT | Mod: 26 | Performed by: INTERNAL MEDICINE

## 2021-02-16 PROCEDURE — 94002 VENT MGMT INPAT INIT DAY: CPT

## 2021-02-16 PROCEDURE — 84100 ASSAY OF PHOSPHORUS: CPT | Performed by: INTERNAL MEDICINE

## 2021-02-16 RX ORDER — MIDAZOLAM (PF) 1 MG/ML IN 0.9 % SODIUM CHLORIDE INTRAVENOUS SOLUTION
1-4 CONTINUOUS
Status: DISCONTINUED | OUTPATIENT
Start: 2021-02-16 | End: 2021-02-20

## 2021-02-16 RX ORDER — NALOXONE HYDROCHLORIDE 0.4 MG/ML
0.4 INJECTION, SOLUTION INTRAMUSCULAR; INTRAVENOUS; SUBCUTANEOUS
Status: DISCONTINUED | OUTPATIENT
Start: 2021-02-16 | End: 2021-03-17 | Stop reason: HOSPADM

## 2021-02-16 RX ORDER — BUMETANIDE 0.25 MG/ML
3 INJECTION INTRAMUSCULAR; INTRAVENOUS ONCE
Status: COMPLETED | OUTPATIENT
Start: 2021-02-16 | End: 2021-02-16

## 2021-02-16 RX ORDER — DEXTROSE MONOHYDRATE 25 G/50ML
25-50 INJECTION, SOLUTION INTRAVENOUS
Status: DISCONTINUED | OUTPATIENT
Start: 2021-02-16 | End: 2021-02-24

## 2021-02-16 RX ORDER — NALOXONE HYDROCHLORIDE 0.4 MG/ML
0.2 INJECTION, SOLUTION INTRAMUSCULAR; INTRAVENOUS; SUBCUTANEOUS
Status: DISCONTINUED | OUTPATIENT
Start: 2021-02-16 | End: 2021-03-17 | Stop reason: HOSPADM

## 2021-02-16 RX ORDER — FINASTERIDE 5 MG/1
5 TABLET, FILM COATED ORAL DAILY
Status: DISCONTINUED | OUTPATIENT
Start: 2021-02-17 | End: 2021-02-21

## 2021-02-16 RX ORDER — NOREPINEPHRINE BITARTRATE 0.06 MG/ML
0.03-0.4 INJECTION, SOLUTION INTRAVENOUS CONTINUOUS
Status: DISCONTINUED | OUTPATIENT
Start: 2021-02-16 | End: 2021-02-19

## 2021-02-16 RX ORDER — MAGNESIUM SULFATE HEPTAHYDRATE 40 MG/ML
2 INJECTION, SOLUTION INTRAVENOUS EVERY 6 HOURS PRN
Status: DISCONTINUED | OUTPATIENT
Start: 2021-02-16 | End: 2021-02-23

## 2021-02-16 RX ORDER — LEVALBUTEROL INHALATION SOLUTION 0.63 MG/3ML
0.63 SOLUTION RESPIRATORY (INHALATION) EVERY 6 HOURS
Status: DISCONTINUED | OUTPATIENT
Start: 2021-02-16 | End: 2021-02-16

## 2021-02-16 RX ORDER — PROPOFOL 10 MG/ML
INJECTION, EMULSION INTRAVENOUS PRN
Status: DISCONTINUED | OUTPATIENT
Start: 2021-02-16 | End: 2021-02-16

## 2021-02-16 RX ORDER — ASPIRIN 81 MG/1
81 TABLET, CHEWABLE ORAL DAILY
Status: DISCONTINUED | OUTPATIENT
Start: 2021-02-17 | End: 2021-03-17 | Stop reason: HOSPADM

## 2021-02-16 RX ORDER — BUMETANIDE 0.25 MG/ML
2 INJECTION INTRAMUSCULAR; INTRAVENOUS ONCE
Status: COMPLETED | OUTPATIENT
Start: 2021-02-16 | End: 2021-02-16

## 2021-02-16 RX ORDER — OMEPRAZOLE
20 KIT
Status: DISCONTINUED | OUTPATIENT
Start: 2021-02-17 | End: 2021-02-21

## 2021-02-16 RX ORDER — DEXTROSE MONOHYDRATE 100 MG/ML
INJECTION, SOLUTION INTRAVENOUS CONTINUOUS PRN
Status: DISCONTINUED | OUTPATIENT
Start: 2021-02-16 | End: 2021-03-17 | Stop reason: HOSPADM

## 2021-02-16 RX ORDER — NICOTINE POLACRILEX 4 MG
15-30 LOZENGE BUCCAL
Status: DISCONTINUED | OUTPATIENT
Start: 2021-02-16 | End: 2021-02-24

## 2021-02-16 RX ORDER — POTASSIUM CHLORIDE 29.8 MG/ML
20 INJECTION INTRAVENOUS EVERY 6 HOURS PRN
Status: DISCONTINUED | OUTPATIENT
Start: 2021-02-16 | End: 2021-02-23

## 2021-02-16 RX ORDER — DOBUTAMINE HYDROCHLORIDE 200 MG/100ML
2 INJECTION INTRAVENOUS CONTINUOUS
Status: DISCONTINUED | OUTPATIENT
Start: 2021-02-16 | End: 2021-02-20

## 2021-02-16 RX ADMIN — PHENYLEPHRINE HYDROCHLORIDE 300 MCG: 10 INJECTION INTRAVENOUS at 01:23

## 2021-02-16 RX ADMIN — Medication 3 MG/HR: at 22:50

## 2021-02-16 RX ADMIN — INSULIN ASPART 1 UNITS: 100 INJECTION, SOLUTION INTRAVENOUS; SUBCUTANEOUS at 00:29

## 2021-02-16 RX ADMIN — VANCOMYCIN HYDROCHLORIDE 2000 MG: 10 INJECTION, POWDER, LYOPHILIZED, FOR SOLUTION INTRAVENOUS at 22:50

## 2021-02-16 RX ADMIN — CALCIUM CHLORIDE, MAGNESIUM CHLORIDE, SODIUM CHLORIDE, SODIUM BICARBONATE, POTASSIUM CHLORIDE AND SODIUM PHOSPHATE DIBASIC DIHYDRATE 2 ML/KG/HR: 3.68; 3.05; 6.34; 3.09; .314; .187 INJECTION INTRAVENOUS at 11:21

## 2021-02-16 RX ADMIN — CALCIUM CHLORIDE, MAGNESIUM CHLORIDE, SODIUM CHLORIDE, SODIUM BICARBONATE, POTASSIUM CHLORIDE AND SODIUM PHOSPHATE DIBASIC DIHYDRATE 12.5 ML/KG/HR: 3.68; 3.05; 6.34; 3.09; .314; .187 INJECTION INTRAVENOUS at 17:11

## 2021-02-16 RX ADMIN — Medication 0.5 UNITS/HR: at 11:39

## 2021-02-16 RX ADMIN — MIDAZOLAM 2 MG: 1 INJECTION INTRAMUSCULAR; INTRAVENOUS at 01:36

## 2021-02-16 RX ADMIN — Medication 100 MG: at 01:23

## 2021-02-16 RX ADMIN — ASPIRIN 81 MG: 81 TABLET, COATED ORAL at 08:26

## 2021-02-16 RX ADMIN — FLUOXETINE 30 MG: 20 CAPSULE ORAL at 08:26

## 2021-02-16 RX ADMIN — CEFEPIME HYDROCHLORIDE 2 G: 2 INJECTION, POWDER, FOR SOLUTION INTRAVENOUS at 13:51

## 2021-02-16 RX ADMIN — TAMSULOSIN HYDROCHLORIDE 0.4 MG: 0.4 CAPSULE ORAL at 08:26

## 2021-02-16 RX ADMIN — Medication 50 MCG/HR: at 01:40

## 2021-02-16 RX ADMIN — INSULIN GLARGINE 10 UNITS: 100 INJECTION, SOLUTION SUBCUTANEOUS at 00:27

## 2021-02-16 RX ADMIN — PROPOFOL 50 MG: 10 INJECTION, EMULSION INTRAVENOUS at 01:23

## 2021-02-16 RX ADMIN — PROPOFOL 20 MG: 10 INJECTION, EMULSION INTRAVENOUS at 01:26

## 2021-02-16 RX ADMIN — OMEPRAZOLE 20 MG: 20 CAPSULE, DELAYED RELEASE ORAL at 08:26

## 2021-02-16 RX ADMIN — PHENYLEPHRINE HYDROCHLORIDE 200 MCG: 10 INJECTION INTRAVENOUS at 01:26

## 2021-02-16 RX ADMIN — CALCIUM CHLORIDE, MAGNESIUM CHLORIDE, SODIUM CHLORIDE, SODIUM BICARBONATE, POTASSIUM CHLORIDE AND SODIUM PHOSPHATE DIBASIC DIHYDRATE 12.5 ML/KG/HR: 3.68; 3.05; 6.34; 3.09; .314; .187 INJECTION INTRAVENOUS at 11:21

## 2021-02-16 RX ADMIN — CEFEPIME HYDROCHLORIDE 2 G: 2 INJECTION, POWDER, FOR SOLUTION INTRAVENOUS at 22:50

## 2021-02-16 RX ADMIN — Medication 4 MG/HR: at 01:42

## 2021-02-16 RX ADMIN — BUMETANIDE 2 MG: 0.25 INJECTION INTRAMUSCULAR; INTRAVENOUS at 04:23

## 2021-02-16 RX ADMIN — FINASTERIDE 5 MG: 5 TABLET, FILM COATED ORAL at 08:42

## 2021-02-16 RX ADMIN — CALCIUM CHLORIDE, MAGNESIUM CHLORIDE, SODIUM CHLORIDE, SODIUM BICARBONATE, POTASSIUM CHLORIDE AND SODIUM PHOSPHATE DIBASIC DIHYDRATE 12.5 ML/KG/HR: 3.68; 3.05; 6.34; 3.09; .314; .187 INJECTION INTRAVENOUS at 20:58

## 2021-02-16 RX ADMIN — AMIODARONE HYDROCHLORIDE 1 MG/MIN: 50 INJECTION, SOLUTION INTRAVENOUS at 01:08

## 2021-02-16 RX ADMIN — SODIUM BICARBONATE 50 MEQ: 84 INJECTION INTRAVENOUS at 02:39

## 2021-02-16 RX ADMIN — CALCIUM CHLORIDE 3 G: 100 INJECTION, SOLUTION INTRAVENOUS at 21:14

## 2021-02-16 RX ADMIN — HUMAN INSULIN 2 UNITS/HR: 100 INJECTION, SOLUTION SUBCUTANEOUS at 05:05

## 2021-02-16 RX ADMIN — CALCIUM CHLORIDE, MAGNESIUM CHLORIDE, SODIUM CHLORIDE, SODIUM BICARBONATE, POTASSIUM CHLORIDE AND SODIUM PHOSPHATE DIBASIC DIHYDRATE 12.5 ML/KG/HR: 3.68; 3.05; 6.34; 3.09; .314; .187 INJECTION INTRAVENOUS at 16:58

## 2021-02-16 RX ADMIN — AZITHROMYCIN MONOHYDRATE 500 MG: 500 INJECTION, POWDER, LYOPHILIZED, FOR SOLUTION INTRAVENOUS at 07:35

## 2021-02-16 RX ADMIN — SODIUM BICARBONATE 50 MEQ: 84 INJECTION INTRAVENOUS at 18:51

## 2021-02-16 RX ADMIN — DOBUTAMINE HYDROCHLORIDE 2.5 MCG/KG/MIN: 200 INJECTION INTRAVENOUS at 03:15

## 2021-02-16 RX ADMIN — BUMETANIDE 3 MG: 0.25 INJECTION INTRAMUSCULAR; INTRAVENOUS at 03:18

## 2021-02-16 RX ADMIN — BIVALIRUDIN 0.02 MG/KG/HR: 250 INJECTION INTRACAVERNOUS at 10:21

## 2021-02-16 RX ADMIN — Medication 0.03 MCG/KG/MIN: at 05:04

## 2021-02-16 RX ADMIN — ALLOPURINOL 200 MG: 100 TABLET ORAL at 08:42

## 2021-02-16 RX ADMIN — CALCIUM CHLORIDE, MAGNESIUM CHLORIDE, SODIUM CHLORIDE, SODIUM BICARBONATE, POTASSIUM CHLORIDE AND SODIUM PHOSPHATE DIBASIC DIHYDRATE 14 ML/KG/HR: 3.68; 3.05; 6.34; 3.09; .314; .187 INJECTION INTRAVENOUS at 20:58

## 2021-02-16 RX ADMIN — PIPERACILLIN SODIUM AND TAZOBACTAM SODIUM 3.38 G: 3; .375 INJECTION, POWDER, LYOPHILIZED, FOR SOLUTION INTRAVENOUS at 08:26

## 2021-02-16 RX ADMIN — PIPERACILLIN SODIUM AND TAZOBACTAM SODIUM 3.38 G: 3; .375 INJECTION, POWDER, LYOPHILIZED, FOR SOLUTION INTRAVENOUS at 03:03

## 2021-02-16 RX ADMIN — SODIUM BICARBONATE 100 MEQ: 84 INJECTION INTRAVENOUS at 11:43

## 2021-02-16 ASSESSMENT — ACTIVITIES OF DAILY LIVING (ADL)
ADLS_ACUITY_SCORE: 14
ADLS_ACUITY_SCORE: 13
ADLS_ACUITY_SCORE: 14
ADLS_ACUITY_SCORE: 13
ADLS_ACUITY_SCORE: 14
ADLS_ACUITY_SCORE: 14

## 2021-02-16 NOTE — PROGRESS NOTES
[facility]  Procedure Note           Intubation:       Eliseo Tanner  MRN# [id number]   February 16, 2021, 1:24 AM Indication: Respiratory distress           Patient intubated at: February 16, 2021, 1:24 AM   Patient informed of: Why intubation was required   Informed consent: Obtained   Cervical spine: Was stabilized during the procedure   Sedative medication: Was administered during the procedure   Technique used: Fiberoptic visualization with GlideScope   Endotracheal tube size: 8.0 cm with cuff   Number of attempts: 1   Placement confirmed by: Auscultation of bilateral breath sounds  Visualization of bilateral chest wall rise  End-tidal CO2 monitor  Chest X-ray   ET tube repositioning: Was not performed   Tube secured at: 24 cm      This procedure was performed without difficulty and he tolerated the procedure well with no complications.      Recorded by Esteban FLOWERS

## 2021-02-16 NOTE — PROGRESS NOTES
[facility]    Cardiology Progress Note- Cards 2        Date of Admission:  2/15/2021     Today's Plan  - Continue broad spectrum antibiotics with    Vancomycin (2/15/21 -   )   Piperacillin tazobactam (2/15/21-2/16/21) (switched for renal protection)   Cefepime (2/16/21 -   )    Azithromycin (2/15/21 -    )  - continue dobutamine 2.5  - hold Amiodarone gtt  - start bival  - start CRRT - nephrology consult  - EP consult, recs appreciated  - ongoing goals of care discussion    Assessment & Plan: S      Eliseo Tanner is a 65 year old male with PMHx relevant for NICM with LVEF 15-20%, NYHA III s/p HM III 2/18/2020 (post op complicated by retrosternal hematoma with bleeding in the lungs), s/p ICD placed on 3/16/2020, h/o MSSA driveline infection and bacteremia 11/5/2020 on prophylactic cephalexin, h/o VT on amiodarone, moderate nonobstructive CAD, severe MR, atrial fibrillation on warfarin, hypertension, ABHINAV requiring CPAP, CKD IV, DMII, HLP,  h/o HIT who presented to the Cardiovascular Unit (4E Cardiac ICU) with mixed cardiogenic and distributive shock with a wide an intermittent wide complex tachycardia.       Cardiovascular:    #Mixed Cardiogenic and Septic Shock  #Multiorgan Failure   Presented from [facility] with subacute development of shortness of breath, dyspnea on exertion, dizziness/lightheadedness with near syncopal event found to be febrile with intermittent wide complex tachycardia. Recent COVID19 moderna vaccination. CT chest showing bilateral infiltrates. Community acquired pneumonia vs. Possible recurrent of MSSA bacteremia WBC 24.5, Procal 7.95, , Lactacte 6.9.    Mildly elevated filling pressures CVP 18, PA 35/20, PCWP 11. Likely some component of cardiogenic shock. LVAD parameters relatively stable despite markedly elevated LDH 8,531. Euvolemic on exam. Worsening renal function, Cr 4.62, up-trending LFT's/ INR.  Legionella antigen positive.     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 Bhatti (ID clinic)   # Troponin elevation (likely demand ischemia)  # Essential Hypertension  # Hyperlipidemia        Patient with long standing history of NICM previously implanted LVAD (HM III) on 02/18/20 due to worsening functional status and systolic ventricular dysfunction (further complicated by RV dysfunction by VAD hemodynamic compensatory mechanism, VT in ICU now on Amiodarone and Atrial Fibrillation with AVR requiring DCCV on 02/28/20).      Elevated cardiac biomarkers on presentation, SGC with only mildly elevated filling pressures. Euvolemic on exam. Troponin elevation likely related to demand ischemia with no concerns for ACS. Most recent VAD interrogation without any significant Flow-Alarms    LDH 8,531. Initial concern for LVAD thrombus. However given relatively stable LVAD parameters, degree of LDH elevation is out of proportion. Will continue to monitor for LVAD alarms.      - continue dobutamine  - Held ACE-I/ARB/ARNi: Lisinopril; due to worsening BERNARDA  - No BB; deferred 2/2 shock  - Aldosterone antagonist: deferred while other medical therapy is optimized  - SCD prophylaxis ICD  - Fluid status : mildly hypervolemic, volume removal as tolerated with CRRT  - MAP Goal: 65-85  - On Warfarin for HM-III INR Goal 2-3  - Hold Hydralazine 100mg given septic shock  - Continue ASA 81 mg per oral daily       # Wide complex tachycardia. Atrial Flutter vs Atrial flutter vs Ventricular  Tachycardia  # History of Atrial Fibrillation s/p DCCV/history of Atrial Flutter       Wide complex tachycardia HR 140s on presentation in the setting of febrile illness and likely pneumonia. Did not initially respond to adenosine. Concern for VT. Intermittently back into HR 60s with underlying rhythm of atrial flutter 3:1 conduction block.       - hold Amiodarone gtt  - Ensure K+ >4.0 mEq/L and Mg+2 >2.0  - hold warfarin  - start bivalirudin  - On cardiac telemetry  - EP consult, recs appreciated       Infectious disease    #Sepsis  #Legionella Pneuamonia  #Bilateral pulmonary infiltrates  CT showing bilateral diffuse patchy consolidations concerning for infectious process. Low suspicion for recurrent driveline or possible hardware infection (history of MSSA Chronic Driveline Infection). Urine without pyuria. Urine legionella ag positive.   - f/u blood cultures  - f/u sputum legionella culture  - continue broad spectrum antibiotics    Renal:  # Acute on Chronic CKD stage IV likely 2/2 Septic Shock   Creatinine continues uptrending 3.23 > 4.62 > 4.9. LIJ Dialysis placed in anticipation of CRRT.  - start CRRT  - Daily Weight's  - Strict I/O's  - Daily BMP's  - Avoid any additional nephrotoxicity (will consider modifying antimicrobial therapy after infectious work-up is completed)      GI  # Shock Liver   # Congestive Hepatopathy  Hepatoccelular pattern of liver injury  > 3,496, AST 1,441 > 8,490 likely 2/2 to septic shock. INR elevated 2.13 and uptrending despite warfarin being held.   - Trend LFT's      Hematological   # Chronic Microcytic/Hypochromic Anemia (likely related to iron deficiency)    > No signs of acute bleeding   # Coagulopathy related to sepsis   # IgG Kappa monoclonal Gammopathy of undetermined significance     - Monitor Hgb (baseline 8-9g/dL)  - transfuse for Hb < 7.0   - Patient follows with [facility] Oncology (Dr. Ibarra)     # Previous Concern For HIT  On previous  hospitalization for LVAD placement (02/06/20-03/20/20), concern for HIT. Platelets 250K --> ~ 90K wuth documented history of exposure to unfractionate heparin products at OSH prior to his installation at the [facility] [employer].    At the time, patient underwent two HIT panel tests (first one was negative and second antibody test was positive). No known thrombosis events. Hematology was involved and assisted with anticoagulation efforts (patient was placed on Bivalirudin once heparin products were stopped. DAVINA antibody was negative raising question about validity of diagnosis . Per hematology at the time (Dr. James), no other cause for isolated thrombocytopenia. Immediate recovery of plts following d/c of heparin. 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     - recommend avoiding heparin products  - start bivalirudin      Endocrine  # Type II DM     > Last Hgb A1C: 6.8 (01/06/21)     - Continue PTA Insulin Basaglar 10 Units Aq at bedtime  - On Medium sliding scale insulin  - Oral Hypoglycemia Protocol      Neurologic:  # Disorientation/Toxic Metabolic Encephalopathy  # Chronic Intracranial Small Vessel Disease        Patient reported some lightheadedness/dizziness and disorientation the days prior to hospitalization while at home. However, no reports of LOC, seizure activity, focal deficits, or findings for acute intracranial pathology on most recent OSH CT head w/o contrast (02/15/21). Now s/p intubation and sedated.     - fentanyl and midazolam for pain and sedation     Diet:   NPO, will start TFs pending hospital course  DVT Prophylaxis: bivalirudin gtt  Guevara Catheter: not present  Code Status: Full Code  Fluids: no IVFs  Lines: RIJ CVC (2/15), L IJ dialysis catheter (2/16), left radial arterial line (2/15)      Disposition Plan   Expected discharge: > 7 days, recommended to transitional care unit once fluid volume status optimized on oral medication, arrhythmia stabilized , therapeutic anticoagulation achieved and safe  disposition plan/ TCU bed available.      Entered: Daniel Fleming MD 02/16/2021, 12:28 PM       The patient's care was discussed with the Attending Physician, Dr. Cisneros.    Daniel Fleming MD  [facility]  Please see sign in/sign out for up to date coverage information  ______________________________________________________________________    Interval History   Overnight patient developed hypoxemic respiratory failure. Intubated and sedated. 70% FiO2. Worsening renal function overnight. LFTs continue to trend up. LDH uptrending. Dialysis catheter placed this AM for CRRT. Pressor requirements increasing. Lactate normalized this AM.     LVAD Speed: 5500  PI: 3.6  Power: 4.2  Flow: 4.3    Data reviewed today: I reviewed all medications, new labs and imaging results over the last 24 hours.      Physical Exam   Vital Signs: Temp: 100.5  F (38.1  C) Temp src: Axillary BP: (!) 125/93 Pulse: 101   Resp: 19 SpO2: 98 % O2 Device: Mechanical Ventilator Oxygen Delivery: 8 LPM  Weight: 221 lbs 1.94 oz    In general, the patient is intubated sedated in no apparent distress.      Neck: RIJ and LIJ catheters in place.   Heart: RRR. S1 and S2 no appreciated. Mechanical whir. No murmur, rub, click, or gallop.   Lungs: Bilateral rhonchi and rales   GI: Soft, nontender, nondistended.   Extremities: trace edema.  The pulses are 2+at the radial and DP bilaterally.  Neuro: grossly non focal.   Skin: no rashes.        Data   Recent Labs   Lab 02/16/21  1234 02/16/21  0620 02/16/21  0215 02/15/21  1910 02/11/21  0000 02/11/21   WBC  --   --  24.2* 24.5*  --   --    HGB  --   --  9.6* 9.9*  --   --    MCV  --   --  73* 74*  --   --    PLT  --   --  308 356  --   --    INR  --   --  2.55* 2.13*  --  2.1*   * 129* 126* 129*   < >  --    POTASSIUM 4.5 5.0 5.1 5.1   < >  --    CHLORIDE 97 96 94 97   < >  --    CO2 21 19* 17* 16*   < >  --    BUN 96* 94*  96* 84* 78*   < >  --    CR 4.92*  4.50*  4.62* 4.08* 3.23*   < >  --    ANIONGAP 14 14 15* 17*   < >  --    CALOS 7.5* 7.9* 8.0* 8.7   < >  --    * 210* 212* 215*   < >  --    ALBUMIN 2.4* 2.5* 2.8* 3.0*   < >  --    PROTTOTAL 6.5* 6.9 7.6 8.2   < >  --    BILITOTAL 1.0 1.0 1.1 1.0   < >  --    ALKPHOS 121 128 137 141   < >  --    ALT 3,764* 3,496* 2,562* 768*   < >  --    AST 9,965* 8,490* 6,019* 1,441*   < >  --    TROPI  --   --   --  0.377*  --   --     < > = values in this interval not displayed.     TTE 2/15/2021  Interpretation Summary  HM 3 at 5500 RPM  LV size is small, LVIDd = 3.0 cm. Severely reduced left ventricular function.  Doppler of the inflow cannula and outflow graft are normal.  RV is severely dilated. Severely reduced right ventricular function.  Mild TR is present.  The aortic valve is closed with mild regurgitation.  IVC is dilated and patient is on a ventilator.  No pericardial effusion present.

## 2021-02-16 NOTE — PROGRESS NOTES
Patient arrived to unit at 1745 by [facility] EMT transport. Vitals on arrival showed fever of 102.3, RR of 28 and SR 60s with frequent runs of VT. Patient placed on 3L O2. Cards 2 called to come assess patient. LVAD and BP stable. Shortly after arrival patient went into sustained VT rates 150s. Patient denies pain or SOB with rhythm, but does endorse feeling very weak. Rapid response called. MD at bedside. Zoll pads placed on patient. Gave total of 12mg adenosine IV push per MD order. Gave 150mg IV amiodarone IV push per MD order at bedside. No change to rhythm. Patient A&Ox4 throughout. Transferred to 4E. RN to RN report given at bedside.  Wife, Chapis, called and updated on patient's arrival and transfer to the ICU.     Admission profile and head to toe assessment not yet completed. Belongings had not been unpacked but suitcase, LVAD equipment, CPAP, glasses, and cell phone brought down to 4E. Care to continue on 4E.

## 2021-02-16 NOTE — PROCEDURES
[facility]    Central line (MAC)    Date/Time: 2/15/2021 8:01 PM  Performed by: Pineda Damian MD  Authorized by: Nader Cisneros MD   Indications: vascular access    UNIVERSAL PROTOCOL   Site Marked: NA  Prior Images Obtained and Reviewed:  NA  Required items: Required blood products, implants, devices and special equipment available    Patient identity confirmed:  Verbally with patient  Patient was reevaluated immediately before administering moderate or deep sedation or anesthesia  Confirmation Checklist:  Patient's identity using two indicators, relevant allergies, procedure was appropriate and matched the consent or emergent situation and correct equipment/implants were available  Time out: Immediately prior to the procedure a time out was called    Universal Protocol: the Joint Commission Universal Protocol was followed    Preparation: Patient was prepped and draped in usual sterile fashion    ESBL (mL):  8        Skin prep agent dried: skin prep agent completely dried prior to procedure  Sterile barriers: all five maximum sterile barriers used - cap, mask, sterile gown, sterile gloves, and large sterile sheet  Hand hygiene: hand hygiene performed prior to central venous catheter insertion  Patient position: flat  Catheter type: double lumen  Pre-procedure: landmarks identified  Ultrasound guidance: yes  Sterile ultrasound techniques: sterile gel and sterile probe covers were used  Number of attempts: 2  Successful placement: yes  Post-procedure: line sutured and dressing applied  Assessment: blood return through all ports and free fluid flow    PROCEDURE   Patient Tolerance:  Patient tolerated the procedure well with no immediate complications    Length of time physician/provider present for 1:1 monitoring during sedation: 0

## 2021-02-16 NOTE — ANESTHESIA PROCEDURE NOTES
Airway   Date/Time: 2/16/2021 1:24 AM   Patient location during procedure: ICU  Staff -   CRNA: Wendy Urbano APRN CRNA  Performed By: CRNA  Report Obtained from Primary Care Team  History regarding most recent potassium obtained: Yes  History regarding presence/absence of renal failure obtained:Yes  History regarding stroke/CVA obtained:Yes  History regarding presence/absence of NM disorder:Yes    Indications and Patient Condition  Indications for airway management: airway protection, respiratory insufficiency and hemodynamic instability  Mallampati: Not AssessedInduction type:intravenousMask difficulty assessment: 0 - not attempted    Final Airway Details  Final airway type: endotracheal airway  Successful airway:ETT - single and Oral  Endotracheal Airway Details   ETT size (mm): 8.0  Cuffed: yes  Successful intubation technique: video laryngoscopy  Grade View of Cords: 1  Adjucts: stylet  Measured from: gums/teeth  Secured at (cm): 24  Secured with: commercial tube cheng    Post intubation assessment   ETT secured, Vent settings by primary/ICU team, Sedation to be ordered by primary/ICU team, No apparent complications and Primary/ICU team to review CXR  Placement verified by: capnometry, equal breath sounds and chest rise   Number of attempts at approach: 1  Secured with:commercial tube cheng  Ease of procedure: easy  Dentition: Unchanged and Intact

## 2021-02-16 NOTE — PROGRESS NOTES
CRRT INITIATION NOTE    Consent for CRRT Completed:  YES  Patient s Vascular Access: L internal jugular Catheter              Placement Confirmed: YES  Manufacture:  [employer]  Model:  [id number] Elite  Length/Persian Size:  24/12  Flush Volume:  1.5/1.5    DATA:  Procedure:  CVVHDF  Start Time:  1215  Machine#:  7  Filter:  M150  Blood Flow:  200  ML/min  Pre-Replacement Solution:  Phoxillium BK 4/2.5  Post-Replacement Solution:  Phoxillium BK 4/2.5  Dialysate Solution:  Phoxillium BK 4/2.5  Pre-Replacement Solution Rate:  1300 mL/hr  Post-Replacement Solution Rate:  200 mL/hr  Dialysate Flow Rate:  1300 mL/hr   Patient Removal Rate:  0 mL/hr  Anticoagulation Type and Rate:  n/a    ASSESSMENT:  How Patient Tolerated Initiation:   Vital Signs:  MAP 67 93% HR 92  Initial Pressures:  Access:  -73  Filter:  128  Return:  75  TMP:  56  Change in Filter Pressure:  19      INTERVENTIONS:  none    PLAN:  Continue fluid removal as tolerated per goals of therapy. Check circuit daily and change circuit q72h and prn. Please contact CRRT resource RN at [phone] with any questions/concerns.

## 2021-02-16 NOTE — PROCEDURES
[facility]    Dialysis Catheter Placement     Date/Time: February 16, 2021 8:30 AM   Performed by: Daniel Fleming MD   Authorized by: Nader Cisneros MD   Indications: CRRT     UNIVERSAL PROTOCOL   Site Marked: NA  Prior Images Obtained and Reviewed:  NA  Required items: Required blood products, implants, devices and special equipment available    Patient identity confirmed:  With wrist band and nursing staff  Patient was reevaluated immediately before administering moderate or deep sedation or anesthesia  Confirmation Checklist:  Patient's identity using two indicators, relevant allergies, procedure was appropriate and matched the consent or emergent situation and correct equipment/implants were available  Time out: Immediately prior to the procedure a time out was called    Universal Protocol: the Joint Commission Universal Protocol was followed    Preparation: Patient was prepped and draped in usual sterile fashion    ESBL (mL):  10mL    Side: Left  Site: Internal Jugular (IJ)    Skin prep agent dried: skin prep agent completely dried prior to procedure  Sterile barriers: all five maximum sterile barriers used - cap, mask, sterile gown, sterile gloves, and large sterile sheet  Hand hygiene: hand hygiene performed prior to central venous catheter insertion  Patient position: flat  Catheter type: Dialysis catheter  Pre-procedure: landmarks identified  Ultrasound guidance: yes  Sterile ultrasound techniques: sterile gel and sterile probe covers were used  Number of attempts: 1  Successful placement: yes  Post-procedure: line sutured and dressing applied  Assessment: blood return through all ports and free fluid flow     PROCEDURE   Patient Tolerance:  Patient tolerated the procedure well with no immediate complications    Daniel Fleming MD   Cardiology Fellow, PGY-4  February 16, 2021  12:27 PM

## 2021-02-16 NOTE — CONSULTS
Nephrology Initial Consult  February 16, 2021      Eliseo Tanner MRN:[id number] YOB: 1955  Date of Admission:2/15/2021  Primary care provider: Jay Steele  Requesting physician: Nader Cisneros MD    ASSESSMENT AND RECOMMENDATIONS:   64 yo M with PMHx  NICM  s/p HM III , VT, severe MR, atrial fibrillation on warfarin, hypertension, CKD IV, DMII, HIT presented to OSH with fevers and cough in the setting of syncopal episode transferred to [facility] for further cares. Hospitalization complicated by Afib with RVR with hypotension and intubation with upgrade of cares to the ICU. Found to be in septic shock 2/2 legionella pneumonia with possible cardiogenic shock. Nephrology was consulted for evaluation and management of oliguric BERNARDA    #Oliguric BERNARDA 2/2 ischemic ATN  Baseline Cr last yr s/p LVED placement was around 1. On admission ~2.3 and doubled in the last 24 hours with rapid decrease in UOP. UA unimpressive with baseline proteinuria and new granular casts. Recent swan numbers notable for CVP 18 and wedge 11 mmHg. With deterioriating UOP and ongoing hemodynamics changes, will initiate preemptive renal replacement therapy.  -CRRT with 4K baths  - Avoid nephrotoxins as able  - Strict I/O's  - Renal will continue to follow    #H/o HIT  -Will use Bival with CRRT instead of heparin    #Hyponatermia  Likely 2/2 SIADH with the acute illness and also legionella pneumonia (known to cause hyponatremia)  -Consider obtaining urine osmolality, random urine Na and serum osmolatility if making urine.     #Acid/base   Metabolic acidosis in the setting of elevated lactic acidosis  -Anticipate to improvement with hemodynamics      Recommendations were communicated to primary team via note    Seen and discussed with Dr. Sintia Barry MD   [phone]      REASON FOR CONSULT: Oliguric BERNARDA     HISTORY OF PRESENT ILLNESS:  Admitting provider and nursing notes reviewed    64 yo M with PMHx  NICM  s/p HM III  , VT, severe MR, atrial fibrillation on warfarin, hypertension, CKD IV, DMII, HIT presented to OSH with fevers and cough in the setting of syncopal episode transferred to [facility] for further cares. Nephrology was consulted for evaluation and management of oliguric BERNARDA.    Initially admitted to the floor and decompensated overnight. Elevated lactic acid, LFTs and had RRT for Afib with RVR leading to intubation and transfer to the ICU. Found to be in septic shock 2/2 legionella pneumonia with possible cardiogenic shock.     Baseline Cr ~1 last yr right after the LVED placement. This admission, presented with Cr ~2.3 that has doubled in the last 24 hours with rapid decrease in UOP. UA unimpressive with baseline proteinuria and granular casts.         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)      LVAD (left ventricular assist device) present (H)        Past Surgical History:   Procedure Laterality Date     ANESTHESIA CARDIOVERSION N/A 2/28/2020    Procedure: ANESTHESIA, FOR CARDIOVERSION;  Surgeon: GENERIC ANESTHESIA PROVIDER;  Location: [facility] OR     CV CENTRAL VENOUS CATHETER PLACEMENT N/A 2/13/2020    Procedure: Central Venous Catheter Placement;  Surgeon: Chente Moss MD;  Location: [facility] HEART CARDIAC CATH LAB     CV INTRA-AORTIC BALLOON PUMP INSERTION N/A 2/7/2020    Procedure: Intra-Aortic Balloon Pump Insertion;  Surgeon: Jose Baldwin MD;  Location: [facility] HEART CARDIAC CATH LAB     CV INTRA-AORTIC BALLOON PUMP INSERTION N/A 2/13/2020    Procedure: Intra-Aortic Balloon Pump Insertion;  Surgeon: Chente Moss MD;  Location: [facility] HEART CARDIAC CATH LAB     CV RIGHT HEART CATH MEASUREMENTS RECORDED N/A 9/21/2020    Procedure: CV  RIGHT HEART CATH;  Surgeon: Dalton Baeza MD;  Location:  HEART CARDIAC CATH LAB     CV RIGHT HEART CATH MEASUREMENTS RECORDED N/A 1/7/2021    Procedure: Right Heart Cath;  Surgeon: Chun Ball MD;  Location:  HEART CARDIAC CATH LAB     CV SWAN LUCIANA PROCEDURE N/A 2/13/2020    Procedure: [place] Luciana Procedure;  Surgeon: Chente Moss MD;  Location:  HEART CARDIAC CATH LAB     EP ICD Bilateral 3/16/2020    Procedure: EP ICD;  Surgeon: Dali Day MD;  Location:  HEART CARDIAC CATH LAB     INSERT VENTRICULAR ASSIST DEVICE LEFT (HEARTMATE II) N/A 2/18/2020    Procedure: INSERTION, LEFT VENTRICULAR ASSIST DEVICE (HEARTMATE III);  Surgeon: Mac Jaramillo MD;  Location:  OR     THORACOSCOPY Right 3/6/2020    Procedure: RIGHT VIDEO-ASSISTED THORASCOPIC SURGERY, EVACUATION OF HEMOTHORAX, PLACEMENT OF CHEST TUBES;  Surgeon: William Gan MD;  Location:  OR        MEDICATIONS:  PTA Meds  Prior to Admission medications    Medication Sig Last Dose Taking? Auth Provider   acetaminophen (TYLENOL) 325 MG tablet Take 2 tablets (650 mg) by mouth every 4 hours as needed for mild pain or fever   Mono Gambino PA-C   allopurinol (ZYLOPRIM) 100 MG tablet 2 tablets (200 mg) by Oral or Feeding Tube route daily   Mono Gambino PA-C   amiodarone (PACERONE) 200 MG tablet Take 1 tablet (200 mg) by mouth daily   Mervat Decker PA-C   aspirin (ASA) 81 MG EC tablet Take 1 tablet (81 mg) by mouth daily   Nader Cisneros MD   atorvastatin (LIPITOR) 20 MG tablet Take 20 mg by mouth daily   Unknown, Entered By History   bumetanide (BUMEX) 1 MG tablet Take 1 tablet (1 mg) by mouth daily   Mervat Decker PA-C   cephALEXin (KEFLEX) 500 MG capsule Take 1 capsule (500 mg) by mouth 4 times daily   Negar Bhatti DO   co-enzyme Q-10 200 MG CAPS 200 mg by Oral or Feeding Tube route daily   Mono Gambino PA-C   finasteride (PROSCAR) 5 MG tablet Take 1 tablet (5 mg) by mouth  daily Helps with urinary retention.   Jess Meraz MD   FLUoxetine (PROZAC) 10 MG capsule Take 30 mg by mouth daily   Unknown, Entered By History   hydrALAZINE (APRESOLINE) 50 MG tablet Take 2 tablets (100 mg) by mouth 3 times daily   Nader Cisneros MD   insulin glargine (BASAGLAR KWIKPEN) 100 UNIT/ML pen Inject 10 Units Subcutaneous At Bedtime    Unknown, Entered By History   insulin pen needle (BD JAIME U/F) 32G X 4 MM miscellaneous    Reported, Patient   lisinopril (ZESTRIL) 10 MG tablet Take 1 tablet (10 mg) by mouth daily   Mervat Decker PA-C   magnesium oxide (MAG-OX) 400 MG tablet 1 tablet (400 mg) by Oral or Feeding Tube route daily   Mono Gambino PA-C   multivitamin w/minerals (THERA-VIT-M) tablet Take 1 tablet by mouth daily   Mono Gambino PA-C   nitroGLYcerin (NITROSTAT) 0.4 MG sublingual tablet For chest pain place 1 tablet under the tongue every 5 minutes for 3 doses. If symptoms persist 5 minutes after 1st dose call 911.   Jess Meraz MD   omeprazole (PRILOSEC) 20 MG DR capsule Take 20 mg by mouth daily   Unknown, Entered By History   senna-docusate (SENOKOT-S/PERICOLACE) 8.6-50 MG tablet Take 1 tablet by mouth 2 times daily as needed for constipation   Unknown, Entered By History   tamsulosin (FLOMAX) 0.4 MG capsule Take 1 capsule (0.4 mg) by mouth daily This med helps with urinary retention   Jess Meraz MD   warfarin ANTICOAGULANT (COUMADIN) 2 MG tablet Take 4 mg by mouth daily    Unknown, Entered By History   zolpidem (AMBIEN) 5 MG tablet Take 5 mg by mouth nightly as needed for Insomnia   Reported, Patient      Current Meds    allopurinol  200 mg Oral or Feeding Tube Daily     [START ON 2/17/2021] aspirin  81 mg Oral Daily     azithromycin  500 mg Intravenous Q24H     ceFEPIme (MAXIPIME) IV  2 g Intravenous Q8H     [START ON 2/17/2021] doxazosin  1 mg Oral Daily     [START ON 2/17/2021] finasteride  5 mg Oral Daily     FLUoxetine  30 mg Oral Daily     [START ON  2021] omeprazole  20 mg Oral QAM AC     vancomycin place cheng - receiving intermittent dosing  1 each Intravenous See Admin Instructions     Infusion Meds    amiodarone       bivalirudin ANTICOAGULANT (ANGIOMAX) 250mg/250mL in 0.9% Sodium Chloride 0.02 mg/kg/hr (21 1200)     dextrose       CRRT replacement solution 12.5 mL/kg/hr (21 1121)     DOBUTamine 2.5 mcg/kg/min (21 1200)     fentaNYL 50 mcg/hr (21 1200)     insulin (regular) 2 Units/hr (21 1100)     midazolam 3 mg/hr (21 1200)     - MEDICATION INSTRUCTIONS -       norepinephrine 0.07 mcg/kg/min (21 1200)     CRRT replacement solution 1.996 mL/kg/hr (21 112)     CRRT replacement solution 12.5 mL/kg/hr (21 112)     vasopressin 0.5 Units/hr (21 1200)     Warfarin Therapy Reminder         ALLERGIES:    Allergies   Allergen Reactions     Heparin      HIT screen positive 20, reflex DAVINA negative; however heme recommended treating as if positive  HIT screen negative 20     Oxycodone Itching and Other (See Comments)     Chlorhexidine Rash       REVIEW OF SYSTEMS:  A comprehensive of systems was negative except as noted above.    SOCIAL HISTORY:   Social History     Socioeconomic History     Marital status:      Spouse name: Not on file     Number of children: Not on file     Years of education: Not on file     Highest education level: Not on file   Occupational History     Not on file   Social Needs     Financial resource strain: Not on file     Food insecurity     Worry: Not on file     Inability: Not on file     Transportation needs     Medical: Not on file     Non-medical: Not on file   Tobacco Use     Smoking status: Former Smoker     Quit date: 1994     Years since quittin.8     Smokeless tobacco: Never Used   Substance and Sexual Activity     Alcohol use: Not on file     Drug use: Not on file     Sexual activity: Not on file   Lifestyle     Physical activity     Days  per week: Not on file     Minutes per session: Not on file     Stress: Not on file   Relationships     Social connections     Talks on phone: Not on file     Gets together: Not on file     Attends Religion service: Not on file     Active member of club or organization: Not on file     Attends meetings of clubs or organizations: Not on file     Relationship status: Not on file     Intimate partner violence     Fear of current or ex partner: Not on file     Emotionally abused: Not on file     Physically abused: Not on file     Forced sexual activity: Not on file   Other Topics Concern     Not on file   Social History Narrative     Not on file       FAMILY MEDICAL HISTORY:   No family history on file.      PHYSICAL EXAM:   Temp  Av.2  F (37.9  C)  Min: 97.5  F (36.4  C)  Max: 102.3  F (39.1  C)  Arterial Line BP  Min: 59/51  Max: 253/51  Arterial Line MAP (mmHg)  Av.9 mmHg  Min: 57 mmHg  Max: 122 mmHg      Pulse  Av.8  Min: 56  Max: 248 Resp  Av.9  Min: 17  Max: 60  FiO2 (%)  Av.1 %  Min: 50 %  Max: 100 %  SpO2  Av.7 %  Min: 53 %  Max: 100 %    CVP (mmHg): 18 mmHg  BP (!) 125/93   Pulse 101   Temp 100.5  F (38.1  C) (Axillary)   Resp 19   Wt 100.3 kg (221 lb 1.9 oz)   SpO2 98%   BMI 34.63 kg/m     Date 21 0700 - 21 0659   Shift 3923-1512 9624-0188 4536-0314 24 Hour Total   INTAKE   I.V. 591.59   591.59   NG/GT 75   75   Shift Total(mL/kg) 666.59(6.65)   666.59(6.65)   OUTPUT   Urine 22   22   Emesis/NG output 0   0   Shift Total(mL/kg) 22(0.22)   22(0.22)   Weight (kg) 100.3 100.3 100.3 100.3      Admit Weight: 100.3 kg (221 lb 1.9 oz)     GENERAL APPEARANCE: intubated and sedate  EYES: eyes closed  Pulmonary: mechanical ventilation  CV: LVED hum   - hard to assess JVD   - No LE edema  GI: soft, nontender, normal bowel sounds  : lombardo in place  SKIN: no rash, warm, dry, no cyanosis  NEURO: intubated and sedated    LABS:   CMP  Recent Labs   Lab 21  0620  02/16/21  0215 02/15/21  1910   * 126* 129*   POTASSIUM 5.0 5.1 5.1   CHLORIDE 96 94 97   CO2 19* 17* 16*   ANIONGAP 14 15* 17*   * 212* 215*   BUN 94*  96* 84* 78*   CR 4.50*  4.62* 4.08* 3.23*   GFRESTIMATED 13*  12* 14* 19*   GFRESTBLACK 15*  14* 16* 22*   CALOS 7.9* 8.0* 8.7   MAG  --  2.3 2.1   PHOS  --   --  5.0*   PROTTOTAL 6.9 7.6 8.2   ALBUMIN 2.5* 2.8* 3.0*   BILITOTAL 1.0 1.1 1.0   ALKPHOS 128 137 141   AST 8,490* 6,019* 1,441*   ALT 3,496* 2,562* 768*     CBC  Recent Labs   Lab 02/16/21  0215 02/15/21  1910   HGB 9.6* 9.9*   WBC 24.2* 24.5*   RBC 4.24* 4.44   HCT 31.1* 32.9*   MCV 73* 74*   MCH 22.6* 22.3*   MCHC 30.9* 30.1*   RDW 20.7* 20.8*    356     INR  Recent Labs   Lab 02/16/21  0620 02/16/21  0215 02/15/21  1910 02/11/21   INR  --  2.55* 2.13* 2.1*   PTT 52*  --  46*  --      ABG  Recent Labs   Lab 02/16/21  1047 02/16/21  0815 02/16/21  0440 02/16/21  0331 02/16/21 0215 02/16/21  0012 02/16/21  0012   PH  --  7.37  --  7.29* 7.21*  --  7.38   PCO2  --  32*  --  38 39  --  27*   PO2  --  180*  --  170* 126*  --  131*   HCO3  --  19*  --  18* 16*  --  16*   O2PER 50 70 70.0 100.0 100.0   < > 8L    < > = values in this interval not displayed.      URINE STUDIES  Recent Labs   Lab Test 02/16/21 0225 11/17/20  0825 02/22/20  0944 02/13/20  0334   COLOR Yellow Yellow Yellow Light Brown   APPEARANCE Cloudy Clear Slightly Cloudy Cloudy   URINEGLC 30* Negative Negative 30*   URINEBILI Negative Negative Negative Negative   URINEKETONE Negative Negative Negative Negative   SG 1.019 1.013 1.022 1.016   UBLD Moderate* Negative Small* Large*   URINEPH 5.5 5.5 5.5 6.0   PROTEIN 300* 30* 100* 300*   NITRITE Negative Negative Negative Negative   LEUKEST Negative Negative Small* Small*   RBCU 6* 0 4* >182*   WBCU 0 0 2 81*     No lab results found.  PTH  No lab results found.  IRON STUDIES  Recent Labs   Lab Test 11/16/20  0616 02/08/20  0408   IRON 16* 25*    306   IRONSAT 6* 8*    HEAVENLY 55 161       IMAGING:       Jese Barry MD

## 2021-02-16 NOTE — PROGRESS NOTES
Rapid Response Team Note    Assessment   In assessment a rapid response was called on Eliseo Tanner due to new onset arrhythmia. This presentation is likely due to a flutter vs sustained v tach and worsened by NICM with LVEF 15-20% s/p  III 2020.     Plan   -  Cardiology team at bedside: ordered for adenosine, amiodarone, IV phenyleprine. They are managing RRT bedside and do not need further assistance from writer. They will transfer patient to CV ICU for ongoing care (mary jo placed transfer orders at their request)  -  The Cardiology primary team was able to be reached and they are in agreement with the above plan.  -  Disposition: The patient will be transferred to the ICU.  -  Reassessment and plan follow-up will be performed by the primary team    Eliseo is critically ill with acute arrhythmia. These features are alarming and indicate that the patient is at imminent risk of life-threatening organ failure. Acute deterioration is being managed by the following critical interventions: see above    Total Critical Care time spent by me, excluding procedures, was 10 minutes.  Liz Driscoll PA-C  [facility] RRT AMCOM Job Code Contact #[phone]    Hospital Course   Brief Summary of events leading to rapid response:   Patient transferred from OSH with fever, chills, lethargy. He was noted to have temp 102.3 with new arrhythmia on presentation. Cardiology team is at bedside and concern for new a flutter vs v tach. They plan to transfer patient to the ICU for higher level of care     Admission Diagnosis:   Tachyarrhythmia [R00.0]     Physical Exam   Temp: 102.3  F (39.1  C) Temp  Min: 102.3  F (39.1  C)  Max: 102.3  F (39.1  C)  Resp: 28 Resp  Min: 28  Max: 28  SpO2: 95 % SpO2  Min: 95 %  Max: 95 %  Pulse: 67 Pulse  Min: 67  Max: 67    No data recorded  BP: 103/85 Systolic (24hrs), Av , Min:103 , Max:103   Diastolic (24hrs), Av, Min:85, Max:85     I/Os: No intake/output data recorded.     Exam:    General: acutely ill appearing  Mental Status: AAOx4.  CV: HR 130s, wide complex on monitor   Resp: Currently on 3L O2 NC    Significant Results and Procedures   Lactic Acid:   Recent Labs   Lab Test 11/18/20  0824 11/17/20  0842 11/15/20  1249 09/21/20  1440 03/06/20  1615 03/06/20  1550   LACT  --   --   --  1.3 0.7 0.9   LACTS 0.8 1.2 1.8  --   --   --      CBC:   Recent Labs   Lab Test 02/08/21  0821 01/05/21  1419 12/22/20  0817   WBC 6.3 7.8 6.7   HGB 9.8* 10.8* 10.0*   HCT 33.6* 36.7* 33.1*    327 364        Sepsis Evaluation   The patient is not known to have an infection.  NO EVIDENCE OF SEPSIS at this time.  Vital sign, physical exam, and lab findings are likely due to arrhythmia.

## 2021-02-16 NOTE — PLAN OF CARE
OT 4E: Cancel - OT consult received and acknowledged. Pt not medically appropriate to initiate therapies.

## 2021-02-16 NOTE — PROGRESS NOTES
CLINICAL NUTRITION SERVICES - BRIEF NOTE    Received provider consult for nutrition education with comments CHF - dietitian to instruct patient on 2gm sodium diet (automatic consult on order set). Not appropriate at this time, pt intubated this morning. Will provide as able/appropriate. Written education materials added to discharge paperwork.    RD will follow per LOS protocol or if re-consulted.     Marisela Bhandari RD, LD  [phone]  Pgr: [phone]

## 2021-02-16 NOTE — PROVIDER NOTIFICATION
"   02/15/21 1900   Call Information   Date of Call 02/15/21   Time of Call 1808   Name of person requesting the team April Swift   Title of person requesting team RN   RRT Arrival time 1809   Time RRT ended 1902   Reason for call   Type of RRT Adult   Primary reason for call Cardiovascular   Cardiovascular EKG changes;Other (describe)  (Sustained VT.)   Was patient transferred from the ED, ICU, or PACU within last 24 hours prior to RRT call? Yes   SBAR   Situation Admit/transfer from [facility] with fever 102.3 with chills, lethargy, and currently in sustained VT.   Background NICM with LVEF 15-20%.  ICD placed on 3-16-20.  MSSA driveline infection on 11-5-20.  Hx CAD, severe MR, Afib, HTN, ABHINAV, CKD IV, DM II, HLP, h/o ANA.   Notable History/Conditions Cardiac;Diabetes;Hypertension   Assessment Pt. A+O x 4.  Denies C.P., SOB, n/v or diarrhea.  Pt. states \"I feel weak and cold\".  Temp. 102.3. Sustained VT with  rate in 150's.  Zoll pads placed.  Adenosine 12mg IV push and Amiodorone 150 mg IV push with no change in rhythm.  MAP 89.  RR 24 on 3L nc with 02 sats upper 90's.  Immediate transfer to  with bedside report given by 6C RN.   Interventions CXR;ECG;IV fluids;Labs;Meds;O2 per N/C or mask;Portable monitor   Patient Outcome   Patient Outcome Transferred to  (4E)   RRT Team   Attending/Primary/Covering Physician Cardiology II Team   Date Attending Physician notified 02/15/21   Time Attending Physician notified 1808   Physician(s) Liz Driscoll PA-C   Lead RN Brenda Swift   RT Jie Bingham     "

## 2021-02-16 NOTE — CONSULTS
Electrophysiology Consultation Note   EP Attending: .   Reason for consultation: WCT.   Provider requesting consultation: , Cardiology II Service.  Date of Service: 2/16/2021      HPI:   Mr. Tanner is a 65 year old male who has a past medical history significant for NICM LVEF 15-20% s/p LVAD HM3 2/18/20, s/p secondary prevention ICD 3/2020, VT/VF, moderate nonobstructive CAD, severe MR, HTN, ABHINAV (uses CPAP), DM2, CKD III, HLD, ANA, and prior tobacco use.   He started having worsening SOB/MIRANDA in 9/2019 which progressively worsened. In 10/2019 he was admitted to OSH and was found to have newly reduced LVEF down to 25-30% with severe MR. He was diuresed about 8 lbs until his SCr bumped. He was started on HF medications. He underwent a CHAMP which showed severe MR and then a coronary angiogram which showed mild/moderate CAD with severe branch disease and elevated LVEDP. He was admitted and underwent diuresis again. He was then admitted in 1/2020 with HF exacerbation found to be in cardiogenic shock. He was started on Milrinone and underwent diuresis. He was ultimately, very slowly weaned off the milrinone with stable symptoms and labs. SCr at discharge was 1.4 and then on 1/29/20 it had rapid worsening of creat to 2.6. Therefore the decision was made to admit to restart milrinone infusion. Milrinone was restarted and titrated to 0.5 mcg/kg/min. His renal function improved. His Entresto dose was decreased due to lower BP's. PICC line was placed and he was discharged with home milrinone. He then presented for follow up with Cardiology and was feeling poorly, weight gain, feeling SOB, worsening edema, and felt heart racing. He also reported poor appetite as well. He was then sent here for admission. Prior to arrival to floor he was noted to be in Vfib and required shock x 3. He was loaded with amiodarone, ASA 324mg. He ultimately required LVAD placement. His postoperative course was complicated by  "retrosternal hematoma and bleeding in the lungs, RV failure, ANA, VT/VF, and Afib w/AVR S/p DCCV on 2/28.  He then had ICD implanted on 3/16/20 as secondary prevention of SCD. He has been having increasing SCr over the last few months.  It has seemed that SCr has been worsening despite increasing and decreasing his diuretics. He followed up in EP Clinic in 10/2020 and device site was well healed. He was arranged for updated amiodarone monitoring which was stable/WDL.   He then presented to OSH with subacute dizziness/lightheadedness and syncope while straining for a bowel movement. He reported feeling weak and foggy. Of note, patient recently received His first dose of the COVID-19 vaccine (Moderna) on Thursday 02/11/21. He reported feeling overall \"poorly\" the subsequent days. Symptoms were described as general malaise, poor appetite (with decreased oral intake of arellano liquid/solid nutrients), more short of breath/labored breathing, lightheadedness/\"Fuzzy\" sensation and some disorientation. He did not take his medications this morning because he was feeling unwell. While at OSH, he was given IV fluid bolus, IV abx. He was found to have HR 110s and fever 102.3F. Imaging showed patch right sided infiltrates and labs showed leukocytosis with neutrophilia and lactic acidosis, elevated SCr, and Transaminitis. BNP 12254. He was subsequently transferred here for advanced cares. Upon arrival here, he was in  bpm. He was given adenosine without effect. He was started on IV amiodarone bolus and gtt. He had increased work of breathing and required intubation. He appears to be in septic shock, possibly with some cardiogenic component and possible pump thrombosis. Echo this admission shows small LV size, severely reduced LVEF, severely dilated RV with severely reduced RV function, mild TR, and IVC dilated (on vent). He is currently intubated and sedated.     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)      LVAD (left ventricular assist device) present (H)      Past Surgical History:   Past Surgical History:   Procedure Laterality Date     ANESTHESIA CARDIOVERSION N/A 2/28/2020    Procedure: ANESTHESIA, FOR CARDIOVERSION;  Surgeon: GENERIC ANESTHESIA PROVIDER;  Location: [facility] OR     CV CENTRAL VENOUS CATHETER PLACEMENT N/A 2/13/2020    Procedure: Central Venous Catheter Placement;  Surgeon: Chente Moss MD;  Location:  HEART CARDIAC CATH LAB     CV INTRA-AORTIC BALLOON PUMP INSERTION N/A 2/7/2020    Procedure: Intra-Aortic Balloon Pump Insertion;  Surgeon: Jose Baldwin MD;  Location:  HEART CARDIAC CATH LAB     CV INTRA-AORTIC BALLOON PUMP INSERTION N/A 2/13/2020    Procedure: Intra-Aortic Balloon Pump Insertion;  Surgeon: Chente Moss MD;  Location:  HEART CARDIAC CATH LAB     CV RIGHT HEART CATH MEASUREMENTS RECORDED N/A 9/21/2020    Procedure: CV RIGHT HEART CATH;  Surgeon: Dalton Baeza MD;  Location:  HEART CARDIAC CATH LAB     CV RIGHT HEART CATH MEASUREMENTS RECORDED N/A 1/7/2021    Procedure: Right Heart Cath;  Surgeon: Chun Ball MD;  Location:  HEART CARDIAC CATH LAB     CV SWAN LUCIANA PROCEDURE N/A 2/13/2020    Procedure: [place] Luciana Procedure;  Surgeon: Chente Moss MD;  Location:  HEART CARDIAC CATH LAB     EP ICD Bilateral 3/16/2020    Procedure: EP ICD;  Surgeon: Dali Day MD;  Location:  HEART CARDIAC CATH LAB     INSERT VENTRICULAR ASSIST DEVICE LEFT (HEARTMATE II) N/A 2/18/2020    Procedure: INSERTION, LEFT VENTRICULAR ASSIST DEVICE (HEARTMATE III);  Surgeon: Mac Jaramillo MD;  Location:  OR     THORACOSCOPY Right 3/6/2020    Procedure: RIGHT VIDEO-ASSISTED THORASCOPIC SURGERY, EVACUATION OF  HEMOTHORAX, PLACEMENT OF CHEST TUBES;  Surgeon: William Gan MD;  Location: [facility] OR     Allergies: Per MAR     Allergies   Allergen Reactions     Heparin      HIT screen positive 2/14/20, reflex DAVINA negative; however heme recommended treating as if positive  HIT screen negative 2/11/20     Oxycodone Itching and Other (See Comments)     Chlorhexidine Rash     Medications:   Per MAR current outpatient cardiovascular medications include:   Medications Prior to Admission   Medication Sig Dispense Refill Last Dose     acetaminophen (TYLENOL) 325 MG tablet Take 2 tablets (650 mg) by mouth every 4 hours as needed for mild pain or fever 100 tablet 0      allopurinol (ZYLOPRIM) 100 MG tablet 2 tablets (200 mg) by Oral or Feeding Tube route daily 60 tablet 1      amiodarone (PACERONE) 200 MG tablet Take 1 tablet (200 mg) by mouth daily 90 tablet 3      aspirin (ASA) 81 MG EC tablet Take 1 tablet (81 mg) by mouth daily 90 tablet 3      atorvastatin (LIPITOR) 20 MG tablet Take 20 mg by mouth daily        bumetanide (BUMEX) 1 MG tablet Take 1 tablet (1 mg) by mouth daily 90 tablet 3      cephALEXin (KEFLEX) 500 MG capsule Take 1 capsule (500 mg) by mouth 4 times daily 120 capsule 1      co-enzyme Q-10 200 MG CAPS 200 mg by Oral or Feeding Tube route daily        finasteride (PROSCAR) 5 MG tablet Take 1 tablet (5 mg) by mouth daily Helps with urinary retention. 30 tablet 0      FLUoxetine (PROZAC) 10 MG capsule Take 30 mg by mouth daily        hydrALAZINE (APRESOLINE) 50 MG tablet Take 2 tablets (100 mg) by mouth 3 times daily 560 tablet 3      insulin glargine (BASAGLAR KWIKPEN) 100 UNIT/ML pen Inject 10 Units Subcutaneous At Bedtime         insulin pen needle (BD JAIME U/F) 32G X 4 MM miscellaneous         lisinopril (ZESTRIL) 10 MG tablet Take 1 tablet (10 mg) by mouth daily 90 tablet 3      magnesium oxide (MAG-OX) 400 MG tablet 1 tablet (400 mg) by Oral or Feeding Tube route daily 30 tablet 1      multivitamin w/minerals  (THERA-VIT-M) tablet Take 1 tablet by mouth daily 30 tablet 1      nitroGLYcerin (NITROSTAT) 0.4 MG sublingual tablet For chest pain place 1 tablet under the tongue every 5 minutes for 3 doses. If symptoms persist 5 minutes after 1st dose call 911. 30 tablet 0      omeprazole (PRILOSEC) 20 MG DR capsule Take 20 mg by mouth daily        senna-docusate (SENOKOT-S/PERICOLACE) 8.6-50 MG tablet Take 1 tablet by mouth 2 times daily as needed for constipation        tamsulosin (FLOMAX) 0.4 MG capsule Take 1 capsule (0.4 mg) by mouth daily This med helps with urinary retention 30 capsule 0      warfarin ANTICOAGULANT (COUMADIN) 2 MG tablet Take 4 mg by mouth daily         zolpidem (AMBIEN) 5 MG tablet Take 5 mg by mouth nightly as needed for Insomnia        No current outpatient medications on file.     Current Facility-Administered Medications   Medication Dose Route Frequency     allopurinol  200 mg Oral or Feeding Tube Daily     aspirin  81 mg Oral Daily     azithromycin  500 mg Intravenous Q24H     finasteride  5 mg Oral Daily     FLUoxetine  30 mg Oral Daily     omeprazole  20 mg Oral Daily     piperacillin-tazobactam  3.375 g Intravenous Q6H     tamsulosin  0.4 mg Oral Daily     vancomycin place cheng - receiving intermittent dosing  1 each Intravenous See Admin Instructions     Family History:   No family history on file.  Social History:   Social History     Tobacco Use     Smoking status: Former Smoker     Quit date: 1994     Years since quittin.8     Smokeless tobacco: Never Used   Substance Use Topics     Alcohol use: Not on file       ROS:   Unable to obtain as patient is intubated and sedated.     Physical Examination:   VITALS: BP (!) 125/93   Pulse 93   Temp 100.4  F (38  C) (Axillary)   Resp 20   Wt 100.3 kg (221 lb 1.9 oz)   SpO2 99%   BMI 34.63 kg/m    GENERAL APPEARANCE: intubated, sedated.   HEENT:MMM. PERRLA.   NECK: Supple.    CHEST: CTAB   CARDIOVASCULAR: VAD hum, regular.    ABDOMEN:  BS+, soft, drive line site CDI.   EXTREMITIES: No pedal edema. Distal pulses intact.   NEURO: sedated.   PSYCH: sedated.   SKIN: Warm and dry.     Data:   Labs:  BMP  Recent Labs   Lab 02/16/21  0620 02/16/21  0215 02/15/21  1910   * 126* 129*   POTASSIUM 5.0 5.1 5.1   CHLORIDE 96 94 97   CALOS 7.9* 8.0* 8.7   CO2 19* 17* 16*   BUN 94*  96* 84* 78*   CR 4.50*  4.62* 4.08* 3.23*   * 212* 215*     CBC  Recent Labs   Lab 02/16/21  0215 02/15/21  1910   WBC 24.2* 24.5*   RBC 4.24* 4.44   HGB 9.6* 9.9*   HCT 31.1* 32.9*   MCV 73* 74*   MCH 22.6* 22.3*   MCHC 30.9* 30.1*   RDW 20.7* 20.8*    356     INR  Recent Labs   Lab 02/16/21  0215 02/15/21  1910 02/11/21   INR 2.55* 2.13* 2.1*     Cholesterol (mg/dL)   Date Value   02/08/2020 118     HDL Cholesterol (mg/dL)   Date Value   02/08/2020 71     LDL Cholesterol Calculated (mg/dL)   Date Value   02/08/2020 35     EKG:       ECHO:   Interpretation Summary  HM 3 at 5500 RPM  LV size is small, LVIDd = 3.0 cm. Severely reduced left ventricular function.  Doppler of the inflow cannula and outflow graft are normal.  RV is severely dilated. Severely reduced right ventricular function.  Mild TR is present.  The aortic valve is closed with mild regurgitation.  IVC is dilated and patient is on a ventilator.  No pericardial effusion present.  Assessment:   Mr. Tanner is a 65 year old male who has a past medical history significant for NICM LVEF 15-20% s/p LVAD HM3 2/18/20, s/p secondary prevention ICD 3/2020, VT/VF, moderate nonobstructive CAD, severe MR, HTN, ABHINAV (uses CPAP), DM2, CKD III, HLD, ANA, and prior tobacco use.   He started having worsening SOB/MIRANDA in 9/2019 which progressively worsened. In 10/2019 he was admitted to OSH and was found to have newly reduced LVEF down to 25-30% with severe MR. He was diuresed about 8 lbs until his SCr bumped. He was started on HF medications. He underwent a CHAMP which showed severe MR and then a coronary angiogram which  "showed mild/moderate CAD with severe branch disease and elevated LVEDP. He was admitted and underwent diuresis again. He was then admitted in 1/2020 with HF exacerbation found to be in cardiogenic shock. He was started on Milrinone and underwent diuresis. He was ultimately, very slowly weaned off the milrinone with stable symptoms and labs. SCr at discharge was 1.4 and then on 1/29/20 it had rapid worsening of creat to 2.6. Therefore the decision was made to admit to restart milrinone infusion. Milrinone was restarted and titrated to 0.5 mcg/kg/min. His renal function improved. His Entresto dose was decreased due to lower BP's. PICC line was placed and he was discharged with home milrinone. He then presented for follow up with Cardiology and was feeling poorly, weight gain, feeling SOB, worsening edema, and felt heart racing. He also reported poor appetite as well. He was then sent here for admission. Prior to arrival to floor he was noted to be in Vfib and required shock x 3. He was loaded with amiodarone, ASA 324mg. He ultimately required LVAD placement. His postoperative course was complicated by retrosternal hematoma and bleeding in the lungs, RV failure, ANA, VT/VF, and Afib w/AVR S/p DCCV on 2/28.  He then had ICD implanted on 3/16/20 as secondary prevention of SCD. He has been having increasing SCr over the last few months.  It has seemed that SCr has been worsening despite increasing and decreasing his diuretics. He followed up in EP Clinic in 10/2020 and device site was well healed. He was arranged for updated amiodarone monitoring which was stable/WDL.   He then presented to OSH with subacute dizziness/lightheadedness and syncope while straining for a bowel movement. He reported feeling weak and foggy. Of note, patient recently received His first dose of the COVID-19 vaccine (Moderna) on Thursday 02/11/21. He reported feeling overall \"poorly\" the subsequent days. Symptoms were described as general " "malaise, poor appetite (with decreased oral intake of arellano liquid/solid nutrients), more short of breath/labored breathing, lightheadedness/\"Fuzzy\" sensation and some disorientation. He did not take his medications this morning because he was feeling unwell. While at OSH, he was given IV fluid bolus, IV abx. He was found to have HR 110s and fever 102.3F. Imaging showed patch right sided infiltrates and labs showed leukocytosis with neutrophilia and lactic acidosis, elevated SCr, and Transaminitis. BNP 62281. He was subsequently transferred here for advanced cares. Upon arrival here, he was in  bpm. He was given adenosine without effect. He was started on IV amiodarone bolus and gtt. He had increased work of breathing and required intubation. He appears to be in septic shock, possibly with some cardiogenic component and possible pump thrombosis. Echo this admission shows small LV size, severely reduced LVEF, severely dilated RV with severely reduced RV function, mild TR, and IVC dilated (on vent). He is currently intubated and sedated.     EP Recommendations:  Atrial Fibrillation/Flutter:  Patient appears to be in AF/AFL as rhythm is irregular, but cannot fully exclude dual tachycardia/Vt as axis does change from prior ECG.   1. Stroke Prophylaxis:  CHADSVASC=+age, +HF, +HTN, +DM  4, corresponding to a 4.0% annual stroke / systemic emolism event rate. indicating need for long term oral anticoagulation.  He also requires anticoagulation for LVAD.   2. Rate Control: not on BB given shock, allow for liberalized HR control in acute illness setting.   3. Rhythm Control: He is on amiodarone gtt and fairly well rate controlled. Rhythm is not impacting his hemodynamics. Can consider DCCV when more stable if still in arrhythmia.   4. Risk Factor Management: Statin, BP control, and ABHINAV evaluation.        Thank you for allowing us to participate in the care of this patient.     The patient was discussed w/ Dr. Cruz.  " The above note reflects our joint plan.    JUAN Reich CNP  Electrophysiology Consult Service  Pager: [phone]    EP STAFF NOTE  I have seen and examined the patient as part of a shared visit with LUIZ Reich NP.  I agree with the note above. I reviewed today's vital signs and medications. I have reviewed and discussed with the advanced practice provider their physical examination, assessment, and plan   Briefly, LVAD, AF/AFL  My key history/exam findings are: AF.   The key management decisions made by me: amio, conservative management, DCCV when stable.    Adi Cruz MD [facility]  Cardiology - Electrophysiology

## 2021-02-16 NOTE — PROVIDER NOTIFICATION
MD notified of new tremors lasting 4-5 seconds and red output in OG. New order for LA and to keep OG to LIS.

## 2021-02-16 NOTE — PHARMACY-VANCOMYCIN DOSING SERVICE
Pharmacy Vancomycin Initial Note  Date of Service February 15, 2021  Patient's  1955  65 year old, male    Indication: Community Acquired Pneumonia    Current estimated CrCl = Estimated Creatinine Clearance: 25 mL/min (A) (based on SCr of 3.23 mg/dL (H)).    Creatinine for last 3 days  2/15/2021:  7:10 PM Creatinine 3.23 mg/dL    Recent Vancomycin Level(s) for last 3 days  No results found for requested labs within last 72 hours.      Vancomycin IV Administrations (past 72 hours)      No vancomycin orders with administrations in past 72 hours.                Nephrotoxins and other renal medications (From now, onward)    Start     Dose/Rate Route Frequency Ordered Stop    21 2200  vancomycin 1500 mg in 0.9% NaCl 250 ml intermittent infusion 1,500 mg      1,500 mg  over 90 Minutes Intravenous EVERY 24 HOURS 02/15/21 2100      21 0800  lisinopril (ZESTRIL) tablet 10 mg      10 mg Oral DAILY 02/15/21 1823      02/15/21 2100  piperacillin-tazobactam (ZOSYN) 3.375 g vial to attach to  mL bag      3.375 g  over 30 Minutes Intravenous EVERY 6 HOURS 02/15/21 1959            Contrast Orders - past 72 hours (72h ago, onward)    None                Plan:  1.  Start vancomycin  2000 mg IV x 1 dose. (~20 mg/kg)  Given BERNARDA will dose vancomycin by level for now until renal function improves.  2.  Goal Trough Level: 15-20 mg/L   3.  Pharmacy will check trough levels as appropriate in 1-3 Days.    4. Serum creatinine levels will be ordered daily for the first week of therapy and at least twice weekly for subsequent weeks.    5. Craig method utilized to dose vancomycin therapy: Method 2    Brooke Mccoy [facility]

## 2021-02-16 NOTE — PROGRESS NOTES
Major Shift Events: Central line and Arterial line placed at start of shift. COVID swabbed, negative. Pan cultured. Cultured LVAD site. Patient Tachypneic, but denies SOB, sats stable. CT C/A/P completed and shortly after arrival back to unit, patient wheezing with increased labored breathing. Cards 2 at bedside to assess, ABG drawn. Patient ultimately required intubation around 0200. Spencer placed by Cards Fellow. CI 2.1. SvO2 47-53. OG and Guevara placed. Remains on Fentanyl, Versed, Dobutamine, Amiodarone, Insulin. VAD without alarms, PIs started to fluctuate 1.7-7.4 around 0430, Cards 2 notified. Family updated over the phone by team.    Plan: Continue plan of care. Obtain ECHO today. Potential dialysis today    For vital signs and complete assessments, please see documentation flowsheets.

## 2021-02-16 NOTE — CONSULTS
Post LVAD Patient Social Work Assessment     Patient Name: Eliseo Tanner  : 1955  Age: 65 year old  MRN: [id number]  Date of LVAD implant: 2020    Patient known to me from follow up in the LVAD program.  Admitted on 2021 for cardiogenic shock . Pt is intubated and on CRRT.  Met w/ pt's wife, at bedside, today to update assessment.      Presenting Information   Living Situation: Pt lives with his wife and adopted granddaughter, in [place], [place]. Pt lives in a a home with three steps to enter where then all needs can be met on the main level. Pt lives 3.5-4hrs from [facility].  Functional Status: PTA, pt was independent with ambulation and ADLs. Pt's wife was doing the LVAD dressing change.  Cultural/Language/Spiritual Considerations: None     Support System  Primary Support Person Pt's wife, [facility]   Other support:  daughter ([place]), 2 sons ([place] & [place]), brother and sister-in-law  Plan for support in immediate post-hospital period: TBD-anticipate that pt will require rehab if he has a prolonged hospital course.     Health Care Directive  Decision Maker: Pt   Alternate Decision Maker: Per FOUZIA, pt's wife would be next appropriate decision maker   Health Care Directive: HCD previously discussed with pt and given copies, but he has never completed a HCD.     Mental Health/Coping:   History of Mental Health: None prior to LVAD  History of Chemical Health: No hx of concerns with substance use.   Current status: No concerns   Coping: Pt is a year out from LVAD implant and for much of the year has previously expressed not being satisfied with the LVAD and quality of life. Pt did have some medical complications that  included driveline infection that required 6 weeks of IV abx back in November (). Today, pt's wife reports that in recent weeks pt had finally started to feel better and had expressed to her feeling good about having the LVAD.   Services Needed/Recommended: Continue to assess coping  "and mental health.     Financial   Income: Social Security Disability, wife's wages/income   Impact of LVAD on income: Minimal impact   Insurance and medication coverage: yes   Financial concerns: none   Resources needed: none     Discharge Plan   Patient and family discharge goal: Wife's goal will be for pt to return home.   Barriers to discharge: Should pt require long-term dialysis this will be a barrier to getting him as there are no dialysis units that can manage an LVAD, near their hometown.       Education provided by SW: Social Work role inpatient setting, parking information and visitor policy    Assessment and recommendations and plan:      Pt and wife well known to writer through support provided to them in the year since the LVAD. Pt is intubated and sedated. Met w/ wife, at the bedside, to provide emotional support. She plans to remain in town for the remainder of the week. Wife surprised with rapid deterioration since pt has arrived here. PTA, pt had been doing well up until the weekend. Pt had received his first COVID 19 vaccine last Thursday. Pt's wife understands that pt is very sick, but remains optimistic that pt will make a full recovery. At the moment, she is in agreement with aggressive cares, including dialysis and reports \"we are only moving forward\". Pt does not have a HCD and pt's wife is legal decision maker.      will remain available for emotional support and goals of care conversations as appropriate.                                         "

## 2021-02-16 NOTE — PROCEDURES
Cardiac ICU Procedure Note  Arterial Line Placement     Service performing procedure: Cardiac ICU  Indication for procedure:Hemodynamic monitoring, frequent ABGs    Acuity:Elective   Procedure date:    A Time Out was performed immediately prior to the procedure to confirm correct patient, procedure, and site (site marked if applicable): Yes     Preparation for Procedure:   Hand hygiene performed prior to insertion: yes   Barrier precautions used (mask, sterile gloves, cap): yes   Skin preparation with chlorhexidine gluconate: yes   Skin preparation agent was allowed to dry: yes   Anesthesia used? Local     Arterial line placed   Side:left  Site:radial   Ultrasound used? Yes  Type of catheter placed:arterial line   Number of attempts:1     The patient tolerated the procedure well except for complications as noted. Immediate complications:NONE     Daniel Fleming MD   Cardiology Fellow, PGY-4  February 15, 2021  8:02 PM

## 2021-02-16 NOTE — PHARMACY-VANCOMYCIN DOSING SERVICE
Pharmacy Empiric Dose Change Per Policy - vancomycin  Original Dose Ordered: intermittent dosing  Dose Changed To: 2000 mg IV q24 h  This dose change was based on the pharmacist's assessment of this patient's age, weight, concurrent drug therapy, treatment goals, whether patient's creatinine clearance adequately indicates renal function (factoring in age, muscle mass, fluid and clinical status), and, if applicable, prior pharmacokinetic data.    Vancomycin dose adjusted since patient started on CRRT. Will schedule next dose to be 24 hours after initial dose, which will allow a few hours of clearance from CRRT prior to re-dosing (patient has likely cleared a minimal amount of initial dose on his own)    Estimated Creatinine Clearance: 16.4 mL/min (A) (based on SCr of 4.92 mg/dL (H)).  Will continue to follow and modify dosage according to levels, organ function and clinical condition    Keagan Ramos, PharmD, BCCCP

## 2021-02-16 NOTE — PHARMACY-ANTICOAGULATION SERVICE
Clinical Pharmacy - Warfarin Dosing Consult     Pharmacy has been consulted to manage this patient s warfarin therapy.  Indication: LVAD/RVAD  Therapy Goal: INR 2-3  Warfarin Prior to Admission: Yes  Warfarin PTA Regimen: 6 mg every MON; 4 mg all other days  Significant drug interactions: pta amiodarone, now on infusion inpatient  Recent documented change in oral intake/nutrition: No    INR   Date Value Ref Range Status   02/15/2021 2.13 (H) 0.86 - 1.14 Final   02/11/2021 2.1 (A) 0.90 - 1.10 Final     Chromogenic Factor 10   Date Value Ref Range Status   03/16/2020 27 (L) 70 - 130 % Final     Comment:     Therapeutic Range:  A Chromogenic Factor 10 level of approximately 20-40%   inversely correlates with an INR of 2-3 for patients receiving Warfarin.   Chromogenic Factor 10 levels below 20% indicate an INR greater than 3 and   levels above 40% indicate an INR less than 2.         Recommend warfarin 6 mg today.  Pharmacy will monitor Eliseo Tanner daily and order warfarin doses to achieve specified goal.      Please contact pharmacy as soon as possible if the warfarin needs to be held for a procedure or if the warfarin goals change.      Brooke Mccoy, PharmD

## 2021-02-16 NOTE — H&P
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    =========================  Infectious Disease/Respiratory:  =========================    # Septic Shock secondary to  Community Acquired Pneumonia (RUL)     > Rule-out recurrent driveline or possible hardware infection (history of MSSA         Chronic Driveline Infection)     > Rule out MSSA bacteremia or other  # Acute Hypoxic Respiratory Failure   # Rule-out COVID-19 pneumonia w/  superimposed bacterial infection               Patient presented as transfer from the [facility] with subacute development of shortness of breath, dyspnea on exertion, dizziness/lightheadedness with near syncopal event and now found to be febrile with ongoing cardiac rhythm aberrancy. Despite most recent COVID19 vaccination event (despite known reports of reactive febrile illness after mRNA inoculation), clinical evidence suggest likelihood of Community acquired pneumonia vs. Possible recurrent of MSSA bacteremia (which will need to be adequately ruled out) in the setting of significant leukocytosis ( WBC: 24.5 with neutrophilia ABS Mini: 22.4 H), Procalcitonin: 7.95, CRP: 270, Lactacte: 6.9, RUL consolidative/focal hepatization as per CXR and history (with concomitant fever, 102.3F).        On  exam, patient does not to be in a hypervolemic state (in fact somewhat  Dry), warm and with palpable pulse  (which correlates with his history of poor oral intake over the last several days and argues against cardiogenic shock). Chemistry with even worsening creatinine now (S Creatinine: 3.23), elevated LDH and up-trending LFT's/ INRwhich is consistent with development of shock (most likely in the context of distributive hemodynamic decompensation). Fortunately, patient seemed to be compensating adequately while maintaining vascular tone/MAP and not manifesting any signs of worsening respiratory distress.Nonetheless, he is  requiring oxygen supplementation (currently at 5LPM) despite no need for non-invasive positive pressure ventilation.         At the time, we will start with adequate (although cautious) volume resuscitation and administration of broad-spectrum anti-microbiral therapy. Given this patient's history of Chronic MSSA driveline infection /bacteremia, the possibility of reinfection must be ruled out given the high morbidity/virulence and persistence of this organism.             - Admit to the  Cardiovascular Intensive Care Unit  - Cardiac Telemetry  - Start Isotonic IV volume expansion: 0.9 NaCl @ 100cc/hour  - Start Antimicrobial Therapy     > Vancomycin (15-20mg/kg q 12 hours)--> Pharmacy to dose Vancomycin consulted     > Piperacillin tazobactam 3.375g q 8 hours IV (for anti-pseudomonal properties)     > Held Abx PPx: Cephalexin while on Vancomycin  - Hemodynamic state: currently sustaining adequate pressures, not requiring        vasoactive/pressor therapy    > Ensure MAP goal above 65 (ultimatly 65-85 given presence of VAD)  - Continuous Pulse Oximetry  - Currently At 5 LPM Nasal Cannula escalate as needed    > Conditional ABG  - Will order Chest/Abdomen/Pelvis CT scan w/o contrast  - Follow-up:    > CRP, ESR, blood cultures x2, UA with microscopy and culture, Sputum Culture          And  Procalcitonin  -  Will consider repeat TTE  -  Will continue to monitor in the ICU      ============  Cardiovascular:  =============    # Chronic HFrEF ( LVEF: 15-20%) NYHA Class IIIA/ Stage D with RV dysfunction   # NICM s/p Heart Mate III 2/18/2020 (post op complicated by retrosternal hematoma    with bleeding in the lungs)    > s/p ICD placed on 3/16/2020  # 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 Bhatti (ID clinic)   # Troponin elevation (likely demand ischemia)  # Essential Hypertension  # Hyperlipidemia        Patient with long standing history of NICM previously implanted LVAD (HM III) on 02/18/20 due to worsening functional status and systolic ventricular dysfunction (further complicated by RV dysfunction by VAD hemodynamic compensatory mechanism, VT in ICU now on Amiodarone and Atrial Fibrillation with AVR requiring DCCV on 02/28/20). Months later, patient developed positive Blood Cultures fro MSSA on 11/14/20 (without any evidence of intraabdominal/intrathoracic fluid collection or vegetations on cardiac leaflets as per [facility] concerning for endocarditis). Bacteremia source was established to be related to driveline colonization complicated with further infection by Staphylococcus aureus. Last positive BCx on 11/14 showed MSSA, BCx 11/15-11/17 NGTD. After completing IV antimicrobial therapy on 12/27/20, patient was placed on antimicrobial prophylaxis Cephalexin 500mg per oral BID.        Dry weight seems unclear. On last hospitalization (01/05/21-01/08/21) there were reports of weight fluctuating at 203-206 lbs. As per last Office Visit with Cardiology Heart Failure service (02/08/21), weight had been at 221 lbs (volume status presumed to be euvolemic at the time).  However, it may be possible that patient's real weight may more likely be in the 215 lbs.         On today's presentation, despite evident elevation of cardiac biomarkers, these are likely  related to stress and hemodynamic compensation and not due to volume overload (which seems rather unlikely). Troponin elevation likely related to demand ischemia with no concerns for ACS. Most recent VAD interrogation without any significant Flow-Alarms, Increased Power or any other features concerning for pump thrombosis.        Most recent evidence of multi-organ injury/failure is more consistent with the possibility of septic shock instead of intrinsic cardiac decompensation.       - Held ACE-I/ARB/ARNi: Lisinopril; due to worsening BERNARDA  - No BB; currently deferred due to history of RV failure  - Aldosterone antagonist: deferred while other medical therapy is optimized  - SCD prophylaxis ICD  - Fluid status Hypovolemic (Diuretic Therapy Held)  - MAP Goal: 65-85  - On Warfarin for HM-III INR Goal 2-3     > Consulted Pharmacy to dose   - Continue PTA Hydralazine 100mg q 8 hours per oral daily for afterload reduction   - Continue ASA 81 mg per oral daily   - Held PTA Tamsulosin      # Current persistent Atrial Flutter vs. Ventricular Tachycardia  # History of onste of Atrial Fibrillation s/p DCCV/history of Atrial Flutter    > On Amiodarone as outpatient     > On Warfarin           Currently exhibiting features of tachycardia/aberratn rhythm although sustaining adequate MAP's (above 65) and no signs of worsening/ongoing respiratory distress.        Considering likely state of volume depletion, current rhythm abnormality could very well be a sequelae of  Cardiovascular stress related to distributive shock vs. Possible ventricular VAD cannular myocardial suction which may further trigger abnormalities.        Despite worsening liver injury, will focus on achieving therapeutic levels of Amiodarone (previously prescribed as outpatient) to avoid further cardiovascular collapse in the context of probable infectious etiology/event.        Nonetheless, patient may have electric circuit aberrancy that may require further  exploration after stabilization. Will consider an EP consult to assist with management once patient is out of the acute phase of current illness.     - Ensure euvolemic state  - Currently on Amiodarone gtt  - Ensure K+ >4.0 mEq/L and Mg+2 >2.0  - On Warfarin as above  - On cardiac telemetry  - Will consult EP Cardiology      =====  Renal:  =====    # Acute on Chronic CKD stage IV likely worsened by Septic Shock     > Component of Cardiorenal Physiology as outpatient   # Lactic Acidosis Type A      - Receiving IVF for intravascular volume expansion   - Trend Lactic Acid  - Daily Weight's  - Strict I/O's  - Daily BMP's  - Avoid any additional nephrotoxicity (will consider modifying antimicrobial therapy after infectious work-up is completed)     ========  GI  ========  # Shock Liver (hepatoccelular pattern of liver injury       > ALT: 768/AST: 1,441      > R factor: 15.3  # Congestive Hepatopathy       Hepatocellular liver injury likely 2/2 to septic shock. INR currently elevated at 2.13 and possibly at goal for anticoagulation purposes, although likely driven by hemodynamic insult and overall inflammatory state.    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 (Dr. Ibarra)    # Previous Concern For HIT       On his previous hospitalization for LVAD placement (02/06/20-03/20/20), there was concern for HIT due to significant drop in PLT counts (250K --> ~ 90K) wuth documented history of exposure to unfractionate heparin products at OSH prior to his installation at the [facility].         At the time, patient underwent two HIT  panel tests (first one was negative and second antibody test was positive). Hematology was involved and assisted with anticoagulation efforts (patient was placed on Bivalirudin once heparin products were stopped. Despite the above, DAVINA antibody was negative rasing question about the validity of the test in the current clinical context.        As per hematology at the time (Dr. James), given there were no obvious additional causes for isolated cytopenia and there was almost immediate recovery of PLT count once heparin products were discontinued (PLT 90K--> 140K), clinical suspicion for HIT was likely valid and accurate despite DAVINA results.     - Recommend avoiding heparin products  - Continue with Warfarin      =========  Endocrine  =========    # Type II DM     > Last Hgb A1C: 6.8 (01/06/21)    - Continue PTA Insulin Basaglar 10 Units Aq at bedtime  - On Medium sliding scale insulin  - Oral Hypoglycemia Protocol       =============  Neurologic:  =============    # Disorientation/Toxic Metabolic Encephalopathy     > resolved  # Chronic Intracranial Small Vessel Disease       Patient reported some lightheadedness/dizziness and disorientation the days prior to hospitalization while at home. However, no reports of LOC, seizure activity, focal deficits, or findings for acute intracranial pathology on most recent OSH CT head w/o contrast (02/15/21). Patient is currently at baseline neurocognitive state.    - Neurochecks q 4 hours        Diet:   Cardiac Na <2.4g Na+  DVT Prophylaxis: Warfarin  Guevara Catheter: not present  Code Status: Full Code  Fluids: 0.9% NaCl @ 100mL/hour   Lines: internal jugular CVC         Disposition Plan   Expected discharge: 4 - 7 days, recommended to prior living arrangement once fluid volume status optimized on oral medication, arrhythmia stabilized  and and source of sepsis adequately treated.    Entered: Bhupendra Castano MD 02/15/2021, 7:19 PM     Case discussed with  "attending physician, Nader Cisneros MD PhD    Bhupendra Castano MD  [facility]  Please see sign in/sign out for up to date coverage information  ______________________________________________________________________    Chief Complaint   General malaise, poor appetite, lightheadedness/dizziness, near syncopal event. Cardiac dysrhythmia evolved to ventricular tachycardia while at the ED.    History is obtained from the patient.    History of Present Illness      Eliseo Tanner is a 65 year old male with PMHx relevant for NICM with LVEF 15-20%, NYHA III s/p HM III 2/18/2020 (post op complicated by retrosternal hematoma with bleeding in the lungs), s/p ICD placed on 3/16/2020, h/o MSSA driveline infection and bacteremia 11/5/2020 on prophylactic cephalexin, h/o VT on amiodarone, moderate nonobstructive CAD, severe MR, atrial fibrillation on warfarin, hypertension, ABHINAV requiring CPAP, CKD IV, DMII, HLP,  h/o HIT who presented to the Cardiovascular Unit (4E Cardiac ICU) for escalation of cares after an RRT was called for persistent (hemodynamically stable) ventricular tachycardia vs. Atrial flutter.        Prior to this event, patient had presented as a transfer on 02/15/21 from the [facility] due to subacute presentation of dizziness/lightheadedness and, most recently, a near syncopal event that occurred earlier this morning while patient was sitting in his toilet straining for a bowel movement. He reported feeling weak and foggy. He reported sustaining a mechanical fall without losing consciousness or receiving any trauma to his head. Of note, patient recently received His first dose of the COVID-19 vaccine (Moderna) on Thursday 02/11/21. He reported feeling overall \"poorly\" the subsequent days. Symptoms were described as general malaise, poor appetite (with decreased oral intake of arellano liquid/solid nutrients), more short of " "breath/labored breathing, lightheadedness/\"Fuzzy\" sensation and some disorientation. He did not take his medications this morning because he was feeling unwell.         While at the [facility] ([place], [place]), patient received 500 cc of IV isotonic fluid (bolus) ago with 500 mg of Azithromycin/1g of Ceftriaxone (IV). Vital signs remarkable for normotension, HR in the 110's and fever (38.1C, 102.3F). Imaging was consistent of chest X-ray which  and revealed patchy right sided infiltrates concerning for infectious phenomenon and a Head CT scan w/o contrast which showed no acute intracranial pathologies, chronic small vessel ischemia (likely a manifestation of hypertensive arteriolosclerosis) and mild cerebral atherosclerosis. Laboratory work-up included CBC with Leukocytosis w/ neutrophilia  (WBC: 21.4H; Segs: 86 H), chronic microcytic/hypochromic anemia (Baseline Hgb likely ~ 9-10 g/dL). Chemistry relevant for lactic acidosis (Lactate: 4.0), elevated creatinine (Cr: 2.8, baseline ~1.8-2.0), transaminitis ( AST: 195, ALT: 111), CPK: 176, Troponin elevation: 0.18 and NT-Pro BNP: 18,500). Coagulation results INR: 1.4, PTT: 34.1         After transferral to the [facility], patient  Was found to be in Atrial Flutter (baseline rhythm) vs. Ventricular Tachycardia, HR in the 140's bpm. After transferral to the 4E unit, patient had received one dose of Adenosine and 150mg bolus of Amiodarone. He eventually received an additional dose  Of 150mg IV of Amiodarone. Patient has been hemodynamically stable although feeling lightheaded, short of breath, nauseous, and overall unwell.      Review of Systems    The 10 point Review of Systems is negative other than noted in the HPI or here.     Past Medical History    I have reviewed this patient's medical history and updated it with pertinent information if needed.   Past Medical History:   Diagnosis Date     Chronic systolic congestive heart failure (H)      " History of implantable cardioverter-defibrillator (ICD) placement      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)      LVAD (left ventricular assist device) present (H)        Past Surgical History   I have reviewed this patient's surgical history and updated it with pertinent information if needed.  Past Surgical History:   Procedure Laterality Date     ANESTHESIA CARDIOVERSION N/A 2/28/2020    Procedure: ANESTHESIA, FOR CARDIOVERSION;  Surgeon: GENERIC ANESTHESIA PROVIDER;  Location: [facility] OR     CV CENTRAL VENOUS CATHETER PLACEMENT N/A 2/13/2020    Procedure: Central Venous Catheter Placement;  Surgeon: Chente Moss MD;  Location:  HEART CARDIAC CATH LAB     CV INTRA-AORTIC BALLOON PUMP INSERTION N/A 2/7/2020    Procedure: Intra-Aortic Balloon Pump Insertion;  Surgeon: Jose Baldwin MD;  Location:  HEART CARDIAC CATH LAB     CV INTRA-AORTIC BALLOON PUMP INSERTION N/A 2/13/2020    Procedure: Intra-Aortic Balloon Pump Insertion;  Surgeon: Chente Moss MD;  Location:  HEART CARDIAC CATH LAB     CV RIGHT HEART CATH MEASUREMENTS RECORDED N/A 9/21/2020    Procedure: CV RIGHT HEART CATH;  Surgeon: Dalton Baeza MD;  Location:  HEART CARDIAC CATH LAB     CV RIGHT HEART CATH MEASUREMENTS RECORDED N/A 1/7/2021    Procedure: Right Heart Cath;  Surgeon: Chun Ball MD;  Location:  HEART CARDIAC CATH LAB     CV SWAN LUCIANA PROCEDURE N/A 2/13/2020    Procedure: Wright Luciana Procedure;  Surgeon: Chente Moss MD;  Location:  HEART CARDIAC CATH LAB     EP ICD Bilateral 3/16/2020    Procedure: EP ICD;  Surgeon: Dali Day MD;  Location:  HEART CARDIAC CATH LAB     INSERT VENTRICULAR ASSIST DEVICE LEFT (HEARTMATE II) N/A 2/18/2020    Procedure: INSERTION, LEFT VENTRICULAR ASSIST DEVICE (HEARTMATE III);  Surgeon: Mac Jaramillo MD;  Location: [facility] OR     THORACOSCOPY Right 3/6/2020    Procedure: RIGHT  VIDEO-ASSISTED THORASCOPIC SURGERY, EVACUATION OF HEMOTHORAX, PLACEMENT OF CHEST TUBES;  Surgeon: William Gan MD;  Location: [facility] OR       Social History   I have reviewed this patient's social history and updated it with pertinent information if needed.  Social History     Tobacco Use     Smoking status: Former Smoker     Quit date: 1994     Years since quittin.8     Smokeless tobacco: Never Used   Substance Use Topics     Alcohol use: Not on file     Drug use: Not on file     Family History   I have reviewed this patient's family history and updated it with pertinent information if needed.       Prior to Admission Medications   Prior to Admission Medications   Prescriptions Last Dose Informant Patient Reported? Taking?   FLUoxetine (PROZAC) 10 MG capsule  Self Yes No   Sig: Take 30 mg by mouth daily   acetaminophen (TYLENOL) 325 MG tablet  Self No No   Sig: Take 2 tablets (650 mg) by mouth every 4 hours as needed for mild pain or fever   allopurinol (ZYLOPRIM) 100 MG tablet  Self No No   Si tablets (200 mg) by Oral or Feeding Tube route daily   amiodarone (PACERONE) 200 MG tablet  Self No No   Sig: Take 1 tablet (200 mg) by mouth daily   aspirin (ASA) 81 MG EC tablet  Self No No   Sig: Take 1 tablet (81 mg) by mouth daily   atorvastatin (LIPITOR) 20 MG tablet  Self Yes No   Sig: Take 20 mg by mouth daily   bumetanide (BUMEX) 1 MG tablet   Yes No   Sig: Take 1 tablet (1 mg) by mouth daily   cephALEXin (KEFLEX) 500 MG capsule   No No   Sig: Take 1 capsule (500 mg) by mouth 4 times daily   co-enzyme Q-10 200 MG CAPS  Self Yes No   Si mg by Oral or Feeding Tube route daily   finasteride (PROSCAR) 5 MG tablet  Self No No   Sig: Take 1 tablet (5 mg) by mouth daily Helps with urinary retention.   hydrALAZINE (APRESOLINE) 50 MG tablet  Self No No   Sig: Take 2 tablets (100 mg) by mouth 3 times daily   insulin glargine (BASAGLAR KWIKPEN) 100 UNIT/ML pen  Self Yes No   Sig: Inject 10 Units Subcutaneous  At Bedtime    insulin pen needle (BD JAIME U/F) 32G X 4 MM miscellaneous  Self Yes No   lisinopril (ZESTRIL) 10 MG tablet  Self Yes No   Sig: Take 1 tablet (10 mg) by mouth daily   magnesium oxide (MAG-OX) 400 MG tablet  Self No No   Si tablet (400 mg) by Oral or Feeding Tube route daily   multivitamin w/minerals (THERA-VIT-M) tablet  Self No No   Sig: Take 1 tablet by mouth daily   nitroGLYcerin (NITROSTAT) 0.4 MG sublingual tablet  Self No No   Sig: For chest pain place 1 tablet under the tongue every 5 minutes for 3 doses. If symptoms persist 5 minutes after 1st dose call 911.   omeprazole (PRILOSEC) 20 MG DR capsule  Self Yes No   Sig: Take 20 mg by mouth daily   senna-docusate (SENOKOT-S/PERICOLACE) 8.6-50 MG tablet  Self Yes No   Sig: Take 1 tablet by mouth 2 times daily as needed for constipation   tamsulosin (FLOMAX) 0.4 MG capsule  Self No No   Sig: Take 1 capsule (0.4 mg) by mouth daily This med helps with urinary retention   warfarin ANTICOAGULANT (COUMADIN) 2 MG tablet  Self Yes No   Sig: Take 4 mg by mouth daily    zolpidem (AMBIEN) 5 MG tablet  Self Yes No   Sig: Take 5 mg by mouth nightly as needed for Insomnia      Facility-Administered Medications: None     Allergies   Allergies   Allergen Reactions     Heparin      HIT screen positive 20, reflex DAVINA negative; however heme recommended treating as if positive  HIT screen negative 20     Oxycodone Itching and Other (See Comments)     Chlorhexidine Rash       Physical Exam   Vital Signs: Temp: 102.3  F (39.1  C) Temp src: Axillary BP: 103/85 Pulse: 67   Resp: 28 SpO2: 95 % O2 Device: Nasal cannula Oxygen Delivery: 3 LPM  Weight: 0 lbs 0 oz    Constitutional: awake, alert, cooperative, no apparent distress, and appears stated age  Eyes: Lids and lashes normal, pupils equal, round and reactive to light, extra ocular muscles intact, sclera clear, conjunctiva normal  ENT: Normocephalic, without obvious abnormality, atraumatic, sinuses  nontender on palpation, external ears without lesions, oral pharynx with dry mucous membranes, tonsils without erythema or exudates. Unable to assess for JVD (internal jugular CVC in place)  Respiratory:  No increased work of breathing or use of intercostals/supraclavicular muscles. Decreased breathing sounds bilaterally with crackles throughout (more in RUL)  Cardiovascular: VAD Hum, unable to identify other cardiovascular sounds. VAD driveline site intact  GI: No scars, normal bowel sounds, soft, non-distended, non-tender, no masses palpated, no hepatosplenomegally  Skin: no bruising or bleeding, no redness, warmth, or swelling, no rashes and no lesions  Musculoskeletal: There is no redness, warmth, or swelling of the joints.  Full range of motion noted.  Motor strength is 5 out of 5 all extremities bilaterally.  Tone is normal.  Neurologic: Awake, alert, oriented to name, place and time.  Cranial nerves II-XII are grossly intact.  Motor is 5 out of 5 bilaterally.  Cerebellar finger to nose, heel to shin intact.  Sensory is intact.  Babinski down going, Romberg negative, and gait is normal.    Data   Data reviewed today: I reviewed all medications, new labs and imaging results over the last 24 hours. I personally reviewed EKG findings.     Results for orders placed or performed during the hospital encounter of 02/15/21 (from the past 24 hour(s))   EKG 12 lead   Result Value Ref Range    Interpretation ECG Click View Image link to view waveform and result    Blood culture    Specimen: Blood    Right Hand   Result Value Ref Range    Specimen Description Blood Right Hand     Culture Micro PENDING    Comprehensive metabolic panel   Result Value Ref Range    Sodium 129 (L) 133 - 144 mmol/L    Potassium 5.1 3.4 - 5.3 mmol/L    Chloride 97 94 - 109 mmol/L    Carbon Dioxide 16 (L) 20 - 32 mmol/L    Anion Gap 17 (H) 3 - 14 mmol/L    Glucose 215 (H) 70 - 99 mg/dL    Urea Nitrogen 78 (H) 7 - 30 mg/dL    Creatinine 3.23 (H)  0.66 - 1.25 mg/dL    GFR Estimate 19 (L) >60 mL/min/[1.73_m2]    GFR Estimate If Black 22 (L) >60 mL/min/[1.73_m2]    Calcium 8.7 8.5 - 10.1 mg/dL    Bilirubin Total 1.0 0.2 - 1.3 mg/dL    Albumin 3.0 (L) 3.4 - 5.0 g/dL    Protein Total 8.2 6.8 - 8.8 g/dL    Alkaline Phosphatase 141 40 - 150 U/L     (HH) 0 - 70 U/L    AST 1,441 (HH) 0 - 45 U/L   Magnesium   Result Value Ref Range    Magnesium 2.1 1.6 - 2.3 mg/dL   Uric acid   Result Value Ref Range    Uric Acid 6.8 3.5 - 7.2 mg/dL   CBC with Differential   Result Value Ref Range    WBC 24.5 (H) 4.0 - 11.0 10e9/L    RBC Count 4.44 4.4 - 5.9 10e12/L    Hemoglobin 9.9 (L) 13.3 - 17.7 g/dL    Hematocrit 32.9 (L) 40.0 - 53.0 %    MCV 74 (L) 78 - 100 fl    MCH 22.3 (L) 26.5 - 33.0 pg    MCHC 30.1 (L) 31.5 - 36.5 g/dL    RDW 20.8 (H) 10.0 - 15.0 %    Platelet Count 356 150 - 450 10e9/L    Diff Method Automated Method     % Neutrophils 91.5 %    % Lymphocytes 1.1 %    % Monocytes 5.4 %    % Eosinophils 0.0 %    % Basophils 0.1 %    % Immature Granulocytes 1.9 %    Nucleated RBCs 0 0 /100    Absolute Neutrophil 22.4 (H) 1.6 - 8.3 10e9/L    Absolute Lymphocytes 0.3 (L) 0.8 - 5.3 10e9/L    Absolute Monocytes 1.3 0.0 - 1.3 10e9/L    Absolute Eosinophils 0.0 0.0 - 0.7 10e9/L    Absolute Basophils 0.0 0.0 - 0.2 10e9/L    Abs Immature Granulocytes 0.5 (H) 0 - 0.4 10e9/L    Absolute Nucleated RBC 0.1    INR   Result Value Ref Range    INR 2.13 (H) 0.86 - 1.14   Lactate Dehydrogenase   Result Value Ref Range    Lactate Dehydrogenase 1,750 (H) 85 - 227 U/L   Troponin I   Result Value Ref Range    Troponin I ES 0.377 (HH) 0.000 - 0.045 ug/L   CK Total   Result Value Ref Range    CK Total 884 (H) 30 - 300 U/L   N - Terminal Pro BNP Inpatient   Result Value Ref Range    N-Terminal Pro BNP Inpatient 27,781 (H) 0 - 900 pg/mL   Partial thromboplastin time   Result Value Ref Range    PTT 46 (H) 22 - 37 sec   Lactic acid whole blood   Result Value Ref Range    Lactic Acid 6.9 (HH) 0.7  - 2.0 mmol/L   Central line (MAC)    Narrative    Pineda Damian MD     2/15/2021  8:04 PM  [facility]    Central line (MAC)    Date/Time: 2/15/2021 8:01 PM  Performed by: Pineda Damian MD  Authorized by: Nader Cisneros MD   Indications: vascular access    UNIVERSAL PROTOCOL   Site Marked: NA  Prior Images Obtained and Reviewed:  NA  Required items: Required blood products, implants, devices and special   equipment available    Patient identity confirmed:  Verbally with patient  Patient was reevaluated immediately before administering moderate or deep   sedation or anesthesia  Confirmation Checklist:  Patient's identity using two indicators, relevant   allergies, procedure was appropriate and matched the consent or emergent   situation and correct equipment/implants were available  Time out: Immediately prior to the procedure a time out was called    Universal Protocol: the Joint Commission Universal Protocol was followed    Preparation: Patient was prepped and draped in usual sterile fashion    ESBL (mL):  8        Skin prep agent dried: skin prep agent completely dried prior to procedure  Sterile barriers: all five maximum sterile barriers used - cap, mask,   sterile gown, sterile gloves, and large sterile sheet  Hand hygiene: hand hygiene performed prior to central venous catheter   insertion  Patient position: flat  Catheter type: double lumen  Pre-procedure: landmarks identified  Ultrasound guidance: yes  Sterile ultrasound techniques: sterile gel and sterile probe covers were   used  Number of attempts: 2  Successful placement: yes  Post-procedure: line sutured and dressing applied  Assessment: blood return through all ports and free fluid flow    PROCEDURE   Patient Tolerance:  Patient tolerated the procedure well with no immediate   complications    Length of time physician/provider present for 1:1 monitoring during   sedation: 0   XR Chest  Port 1 View    Narrative    PRELIM     Impression    IMPRESSION:  1. New right IJ central line sheath tip overlying the low right  internal jugular vein/right innominate vein.  2. New right mid lung and lateral right basilar airspace opacities,  edema versus infection.

## 2021-02-16 NOTE — CONSULTS
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 CXR and CT chest was significant for bilateral consolidative opacities-right worse then left. Urine legionella antigen was positive for pneumophilia serogroup 1.  COVID 19, RVP and influenza testing negative. Supporting evidence for legionella pneumonia in this circumstance includes CT findings, hyponatremia, transaminitis, CRP elevation and positive urine antigen.  Underlying risk factor for severity of disease is age, underlying cardiovascular and renal disease.   The driveline infection overall seems stable based on imaging and that the wife noted improved driveline drainage after increasing cephalexin dose ~ 1 week ago. No evidence of bacteremia. While in the hospital will keep on IV antibiotics to get burden of infection down.     Recommendations:  1. Agree with azithromycin 500 mg IV daily and renally dosed cefepime (CRRT). Dosing of cefepime important to prevent cefepime induced neurotoxicity in the setting of renal failure.    2. Blood, sputum and legionella cultures pending   3. Will continue treatment with IV therapy for MSSA drive line infection-cefepime will provide coverage for now  4. Will determine if this gets reported to public health department    Thank you for this consult. ID will continue to follow this patient. Please feel free to call with any questions.     Negar Bhatti DO  Infectious Diseases  Pager [phone]       History of Present Illness:   Patient is known to me from the outpatient setting. I last had a telephone visit with him on 2/4 for a ongoing drive line infection. At this time I increased his suppression cephalexin from 500 mg po q 12 hours to 500 mg po q 6 hours. On 2/9 he was seen by VAD coordinator and PA for a device check. Noted to have some drainage from the drive line exit site. Culture revealed MSSA. 2/8 CT a/p revealed stranding along the drive line in the subcutaneous tissue. No discrete fluid collections. Interesting, during this visit the patient felt very good.   Drainage from driveline improved after dose increase of cephalexin.     Received 1st dose of the Moderna covid 19 vaccine on 2/11. 2/12-2/14 noted to have general malaise, decreased appetite. During this time his wife took temp-~99. Noted he had a dry cough but it was not frequent. Patient did not complain of nausea, vomiting or diarrhea.     On the morning of 2/15 he fell walking in the bathroom. Initially went to [facility] and then transferred to [facility]. At [facility] he received a dose of azithromycin and ceftriaxone.  On admission noted to be febrile with a leukocytosis and lactic acidosis. After transfer started on empiric pip/tazo and vancomycin. CT chest revealed diffuse bilateral patchy groundglass consolidations (right worse then left), LAD, diffuse mesenteric edema and ascites. Overnight patient decompensated requiring intubation and initiation of CRRT. Urine legionella positive for pneumophilia serogroup 1 antigen.  Influenza, RVP and covid 19 pcr negative.           Review of Systems:   Complete 12 point review of systems negative except as noted in HPI.        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      Social History:     Social History     Socioeconomic History     Marital status:      Spouse name: Not on file     Number of children: Not on file     Years of education: Not on file     Highest education level: Not on file   Occupational History     Not on file   Social Needs     Financial resource strain: Not on file     Food insecurity     Worry: Not on file     Inability: Not on file     Transportation needs     Medical: Not on file     Non-medical: Not on file   Tobacco Use     Smoking status: Former Smoker     Quit date: 1994     Years since quittin.8     Smokeless tobacco: Never Used   Substance and Sexual Activity     Alcohol use: Not on file     Drug use: Not on file     Sexual activity: Not on file   Lifestyle     Physical activity     Days per week: Not on file     Minutes per session: Not on file     Stress: Not on file   Relationships     Social connections     Talks on phone: Not on file     Gets together: Not on file     Attends Faith service: Not on file     Active member of club or organization: Not on file     Attends meetings of  clubs or organizations: Not on file     Relationship status: Not on file     Intimate partner violence     Fear of current or ex partner: Not on file     Emotionally abused: Not on file     Physically abused: Not on file     Forced sexual activity: Not on file   Other Topics Concern     Not on file   Social History Narrative     Not on file              Physical Exam:   BP (!) 125/93   Pulse 94   Temp (P) 98.2  F (36.8  C)   Resp 17   Wt 100.3 kg (221 lb 1.9 oz)   SpO2 97%   BMI 34.63 kg/m     Exam:  GENERAL:  Laying in bed; critically ill  ENT:  Head is normocephalic, atraumatic. ETT in place.  EYES:  Eyes have anicteric sclerae.  Pupils reactive.  LUNGS:  Course breath sounds bilaterally; no accessory muscle use; equal breath sounds; intubated.  CARDIOVASCULAR:  VAD mechanical sounds  ABDOMEN:  Normal bowel sounds, soft  EXT: Extremities warm; trace lower extremity edema  SKIN:  No acute rashes.  Line is in place without any surrounding erythema-R and L internal jugular lines. Driveline dressing intact.  NEUROLOGIC:  Intubated, sedated       Laboratory Data:     Creatinine   Date Value Ref Range Status   02/16/2021 4.92 (H) 0.66 - 1.25 mg/dL Final   02/16/2021 4.62 (H) 0.66 - 1.25 mg/dL Final   02/16/2021 4.50 (H) 0.66 - 1.25 mg/dL Final   02/16/2021 4.08 (H) 0.66 - 1.25 mg/dL Final   02/15/2021 3.23 (H) 0.66 - 1.25 mg/dL Final     WBC   Date Value Ref Range Status   02/16/2021 24.2 (H) 4.0 - 11.0 10e9/L Final   02/15/2021 24.5 (H) 4.0 - 11.0 10e9/L Final   02/08/2021 6.3 4.0 - 11.0 10e9/L Final   01/05/2021 7.8 4.0 - 11.0 10e9/L Final   12/22/2020 6.7 4.0 - 11.0 K/uL Final     Hemoglobin   Date Value Ref Range Status   02/16/2021 9.6 (L) 13.3 - 17.7 g/dL Final     Platelet Count   Date Value Ref Range Status   02/16/2021 308 150 - 450 10e9/L Final     Lab Results   Component Value Date     (L) 02/16/2021    BUN 96 (H) 02/16/2021    CO2 21 02/16/2021     CRP Inflammation   Date Value Ref Range Status    02/15/2021 270.0 (H) 0.0 - 8.0 mg/L Final   02/11/2021 8.6 0.0 - 10.0 mg/L Final   12/17/2020 8.0 0.0 - 8.0 mg/L Final   12/14/2020 6.1 0.0 - 10.0 mg/L Final   11/05/2020 54.9  Final           Pertinent Recent Microbiology Data:     Recent Labs   Lab 02/16/21  0811 02/16/21  0231 02/16/21  0225 02/15/21  2236 02/15/21  2202 02/15/21  2029 02/15/21  1909   CULT  --  Culture negative < 24 hours, reincubate  --  Culture in progress Canceled, Test credited  >10 Squamous epithelial cells/low power field indicates oral contamination. Please   recollect.  Notification of test cancellation was given to  Bethanie Murillo RN on 2.15.21 at 2326. JRT  * No growth after 17 hours No growth after 18 hours   SDES Nares Sputum Endotracheal Urine  Urine Other LINE  Other LINE Sputum  Sputum Blood Right Hand Blood Right Hand            Imaging:     Recent Results (from the past 48 hour(s))   XR Chest Port 1 View    Narrative    Chest radiograph 2/15/2021 8:19 PM    HISTORY: Status post right IJ central line    COMPARISON: CT chest abdomen pelvis 2/8/2021    FINDINGS:  Single AP radiograph of the chest. Postoperative changes of LVAD with  median sternotomy wires. Left chest wall cardiac device with lead  overlying the right ventricle. Right IJ central line sheath tip  overlying the low right internal jugular vein/right innominate vein.  Trachea is midline. Cardiac silhouette is similar in size. No  pneumothorax. No significant right pleural effusion. Left costophrenic  angle is collimated out of view. New right mid lung and lateral right  basilar airspace opacities. Unchanged right midlung streaky opacity.      Impression    IMPRESSION:  1. New right IJ central line sheath tip overlying the low right  internal jugular vein/right innominate vein.  2. New right mid lung and lateral right basilar airspace opacities,  concerning for infection.    I have personally reviewed the examination and initial interpretation  and I agree with the  findings.    DONG SOLORIO MD   CT Chest Abdomen Pelvis w/o Contrast   Result Value    Radiologist flags Concern for pneumonia (Urgent)    Narrative    EXAMINATION: CT CHEST ABDOMEN PELVIS W/O CONTRAST, 2/15/2021 11:33 PM    TECHNIQUE:  Helical CT images from the thoracic inlet through the  symphysis pubis were obtained  with contrast. Contrast dose:    COMPARISON: CT 2/8/2021    HISTORY: Sepsis; LVAD, looking for sepsis    FINDINGS:    Chest:     Left chest wall ICD with lead in the right ventricle. Right IJ sheath  tip terminates in the distal internal jugular vein. LVAD in place at  the cardiac apex with outflow tract entering the proximal ascending  aorta. Along the course of the LVAD driveline and out the outflow  track within the anterior-inferior mediastinum at the level of the  diaphragm, there is trace surrounding fluid collection (series 2 image  194), is decreased from the prior examination. No significant  inflammatory changes about this collection. There is mild surrounding  inflammation within the subcutaneous fat and superior right rectus  abdominis musculature surrounding the drive line, similar to prior.    The visualized thyroid is unremarkable. Normal branching pattern of  the thoracic great vessels. Heart size is upper limits normal. The  main pulmonary artery and thoracic aorta are normal in caliber.  Interval increase in size of a high right paratracheal node measuring  up to 10 mm compared to 7 mm on prior exam. Multiple additional  scattered mediastinal nodes appear slightly increased in size compared  to prior exam 02/08/2021.    The central tracheobronchial tree is patent. Small right-sided pleural  effusion and trace left-sided pleural effusions. Scattered patchy  pulmonary consolidations, involving both lungs and greatest within the  posterior aspects of the right upper and lower lobes. No pneumothorax.    Abdomen and pelvis:     No focal hepatic lesion on today's noncontrast  exam. Layering  hyperattenuating fluid within the gallbladder, may represent  gallbladder sludge. Spleen is unremarkable. Mild stable thickening of  the adrenal glands bilaterally. Fatty atrophy of the pancreas. A few  small scattered calcifications within the pancreas can be seen with  chronic pancreatitis. No renal mass or urinary tract stones. No  hydronephrosis.    Gastric distention with fluid content. The small and large bowel are  normal in caliber without evidence of a bowel obstruction. Colonic  diverticulosis without acute diverticulitis. Appendix is normal.     Diffuse mesenteric edema and small amount of ascites, which has  increased since prior exam 02/08/2021. Perihepatic fluid and a small  amount of presacral fluid identified. No discrete walled off fluid  collection. Mild diffuse thickening of the urinary bladder, similar to  prior. Prostate is unremarkable. Symmetric seminal vesicles. No  intra-abdominal or pelvic lymphadenopathy. Normal caliber abdominal  aorta with mild to moderate atrophy, calcifications noted in the  origin of the celiac, and SMA arteries.    Bones and soft tissues: No acute or suspicious osseous lesion.  Postoperative changes of median sternotomy with intact sternotomy  wires. Degenerative changes of the spine. Grade 1 anterolisthesis of  L3 on L4 and L4 on L5, stable. There appears to be moderate to severe  canal stenosis at L4-L5 secondary to a disc osteophyte complex. Small  fat-containing inguinal hernia. Degenerative changes about the  bilateral hips with mineralized density seen within the surrounding  bursae.      Impression    IMPRESSION:   1. Diffuse patchy consolidations throughout both lungs concerning for  an acute infectious process. Interval increase in size of multiple  mediastinal lymph nodes measuring up to 10 mm, likely reactive.  2. Small right-sided pleural effusion and trace left-sided pleural  effusion.  3. Postsurgical changes of LVAD placement with mild  inflammatory soft  tissue changes about the drive line traversing the subcutaneous fat  and superior right rectus abdominis musculature, concerning for drive  line infection, similar to prior.   4. Diffuse mesenteric edema and small amount of intra-abdominal  ascites, new from prior. Fluid within the abdomen and pelvis measures  low density.  5. Mild diffuse bladder wall thickening, this can be seen with  cystitis. Recommend clinical correlation.    [Urgent Result: Concern for pneumonia]    Dr. Fuentes was contacted by Dr. Oliver at 2/16/2021 1:31 AM and  verbalized understanding of the urgent finding.     I have personally reviewed the examination and initial interpretation  and I agree with the findings.    DEION CRUZ MD   XR Chest Port 1 View    Narrative    EXAM: XR CHEST PORT 1 VW  2/16/2021 2:25 AM     HISTORY:  intubated and swan chucky       COMPARISON:  CT and x-ray 2/15/2021    FINDINGS:   AP semiupright view of the chest. Endotracheal tube tip in the mid  thoracic trachea. Right IJ Pompeys Pillar-Chucky catheter tip projects over the  expected location of the interlobar right pulmonary artery. Left chest  wall ICD. Enteric tube sidehole projects over the stomach, tip outside  the field of view. LVAD. Intact median sternotomy wires. No  appreciable pneumothorax. Small right pleural effusion. Right greater  than left patchy pulmonary opacities. Small amount of fluid within the  right minor fissure. Osseous structures are unchanged.      Impression    IMPRESSION:   1. Endotracheal tube tip projects in the mid thoracic trachea.  2. Right IJ Pompeys Pillar-Chucky catheter tip projects in the expected location  of the right interlobar pulmonary artery. Additional support devices  as above.  3. Right greater than left patchy pulmonary opacities, consolidation  or atelectasis.  4. Small right pleural effusion.    I have personally reviewed the examination and initial interpretation  and I agree with the findings.    DEION CRUZ,  MD   XR Abdomen Port 1 View    Narrative    Exam: XR ABDOMEN PORT 1 VW, 2021 2:25 AM    Indication: og placement    Comparison: CT 2/15/2021    Findings:   Portable supine view the abdomen. Enteric tube tip and sidehole  project over the stomach. LVAD. Lewis-Chucky catheter tip projects over  the expected location of the right interlobar pulmonary artery. Mild  gaseous distention of bowel visualized in the upper abdomen. No portal  venous gas. Degenerative changes of the spine.      Impression    Impression:  1. Enteric tube tip and sidehole project over the stomach.  2. Mild nonspecific gaseous distention of the bowel visualized in the  upper abdomen.    I have personally reviewed the examination and initial interpretation  and I agree with the findings.    DEION CRUZ MD   Echocardiogram Limited    Narrative    [id number]  [id number]  [id number]  [id number]^ROMAINE^BRANNON           [facility],[place]  Echocardiography Laboratory  [address] [place] [id number]     Name: WOLFGANG LEACH  MRN: [id number]  : 1955  Study Date: 2021 07:52 AM  Age: 65 yrs  Gender: Male  Patient Location: [facility]  Reason For Study: CHF  Ordering Physician: BRANNON GAVIN  Referring Physician: SYSTEM, PROVIDER NOT IN  Performed By: Sj Guillen RDCS     BSA: 2.1 m2  Height: 67 in  Weight: 221 lb  HR: 94  BP: 103/85 mmHg  _____________________________________________________________________________  __        Procedure  Limited Portable Echo Adult.  _____________________________________________________________________________  __        Interpretation Summary  HM 3 at 5500 RPM  LV size is small, LVIDd = 3.0 cm. Severely reduced left ventricular function.  Doppler of the inflow cannula and outflow graft are normal.  RV is severely dilated. Severely reduced right ventricular function.  Mild TR is present.  The aortic valve is closed with mild regurgitation.  IVC is dilated and patient is on  a ventilator.  No pericardial effusion present.  _____________________________________________________________________________  __     _____________________________________________________________________________  __     MMode/2D Measurements & Calculations  IVSd: 1.2 cm  LVIDd: 3.0 cm  LVIDs: 2.8 cm  LVPWd: 1.0 cm  FS: 7.4 %  LV mass(C)d: 101.5 grams  LV mass(C)dI: 48.1 grams/m2  RWT: 0.68        Doppler Measurements & Calculations  PA acc time: 0.12 sec     _____________________________________________________________________________  __           Report approved by: Graciela Caballero 02/16/2021 09:13 AM      XR Chest Port 1 View    Narrative    EXAM: XR CHEST PORT 1 VW  2/16/2021 10:46 AM     HISTORY:  dialysis line placed       COMPARISON:  Chest x-ray 2/16/2021 2:15 AM    FINDINGS:   Semiupright portable AP view of the chest. Left IJ central venous  dialysis catheter with tip projecting in the mid SVC. Endotracheal  tube tip in the mid thoracic trachea. Right IJ Tuttle-Chucky catheter tip  at the junction of the right pulmonary and interlobar artery. Left  chest wall ICD with single intact lead projecting over the heart in  similar position. Enteric tube passes below the left hemidiaphragm and  out of field of view. LVAD. Postsurgical changes of the chest with  median sternotomy wires intact.    Trachea is midline. Cardiomediastinal silhouette is within normal  limits. No appreciable pneumothorax. No significant change to small  right pleural effusion. Similar right greater than left patchy  pulmonary opacities. Persistent small fluid in the minor fissure.      Impression    IMPRESSION:     1. New left IJ central venous dialysis catheter with tip projecting in  the mid SVC. Stable position of other support devices.  2. No significant change in the patchy pulmonary opacities,  consolidation and/or atelectasis  3. No significant change in small right pleural effusion.    I have personally reviewed the examination and  initial interpretation  and I agree with the findings.    QUIQUE NICHOLS MD

## 2021-02-17 ENCOUNTER — APPOINTMENT (OUTPATIENT)
Dept: GENERAL RADIOLOGY | Facility: CLINIC | Age: 66
DRG: 870 | End: 2021-02-17
Attending: STUDENT IN AN ORGANIZED HEALTH CARE EDUCATION/TRAINING PROGRAM
Payer: MEDICARE

## 2021-02-17 ENCOUNTER — APPOINTMENT (OUTPATIENT)
Dept: NEUROLOGY | Facility: CLINIC | Age: 66
DRG: 870 | End: 2021-02-17
Attending: INTERNAL MEDICINE
Payer: MEDICARE

## 2021-02-17 LAB
ALBUMIN SERPL-MCNC: 2.2 G/DL (ref 3.4–5)
ALBUMIN SERPL-MCNC: 2.3 G/DL (ref 3.4–5)
ALBUMIN SERPL-MCNC: 2.4 G/DL (ref 3.4–5)
ALBUMIN SERPL-MCNC: 2.4 G/DL (ref 3.4–5)
ALP SERPL-CCNC: 119 U/L (ref 40–150)
ALP SERPL-CCNC: 126 U/L (ref 40–150)
ALP SERPL-CCNC: 128 U/L (ref 40–150)
ALP SERPL-CCNC: 130 U/L (ref 40–150)
ALT SERPL W P-5'-P-CCNC: 3488 U/L (ref 0–70)
ALT SERPL W P-5'-P-CCNC: 3720 U/L (ref 0–70)
ALT SERPL W P-5'-P-CCNC: 3804 U/L (ref 0–70)
ALT SERPL W P-5'-P-CCNC: 3843 U/L (ref 0–70)
ANION GAP SERPL CALCULATED.3IONS-SCNC: 5 MMOL/L (ref 3–14)
ANION GAP SERPL CALCULATED.3IONS-SCNC: 7 MMOL/L (ref 3–14)
ANION GAP SERPL CALCULATED.3IONS-SCNC: 8 MMOL/L (ref 3–14)
ANION GAP SERPL CALCULATED.3IONS-SCNC: 9 MMOL/L (ref 3–14)
APTT PPP: 62 SEC (ref 22–37)
AST SERPL W P-5'-P-CCNC: 6182 U/L (ref 0–45)
AST SERPL W P-5'-P-CCNC: 6374 U/L (ref 0–45)
AST SERPL W P-5'-P-CCNC: 6440 U/L (ref 0–45)
AST SERPL W P-5'-P-CCNC: 7648 U/L (ref 0–45)
BASE DEFICIT BLDA-SCNC: 1.5 MMOL/L
BASE DEFICIT BLDA-SCNC: 1.6 MMOL/L
BASE DEFICIT BLDA-SCNC: 2 MMOL/L
BASE DEFICIT BLDA-SCNC: 2.1 MMOL/L
BASE DEFICIT BLDV-SCNC: 0.5 MMOL/L
BASE DEFICIT BLDV-SCNC: 0.8 MMOL/L
BASE DEFICIT BLDV-SCNC: 2.2 MMOL/L
BASE DEFICIT BLDV-SCNC: 3.3 MMOL/L
BASE DEFICIT BLDV-SCNC: 3.9 MMOL/L
BASOPHILS # BLD AUTO: 0 10E9/L (ref 0–0.2)
BASOPHILS # BLD AUTO: 0 10E9/L (ref 0–0.2)
BASOPHILS NFR BLD AUTO: 0.1 %
BASOPHILS NFR BLD AUTO: 0.2 %
BILIRUB SERPL-MCNC: 1.3 MG/DL (ref 0.2–1.3)
BILIRUB SERPL-MCNC: 1.4 MG/DL (ref 0.2–1.3)
BUN SERPL-MCNC: 44 MG/DL (ref 7–30)
BUN SERPL-MCNC: 48 MG/DL (ref 7–30)
BUN SERPL-MCNC: 52 MG/DL (ref 7–30)
BUN SERPL-MCNC: 62 MG/DL (ref 7–30)
CA-I BLD-MCNC: 4 MG/DL (ref 4.4–5.2)
CA-I BLD-MCNC: 4.1 MG/DL (ref 4.4–5.2)
CA-I BLD-MCNC: 4.4 MG/DL (ref 4.4–5.2)
CA-I BLD-MCNC: 4.4 MG/DL (ref 4.4–5.2)
CALCIUM SERPL-MCNC: 7.6 MG/DL (ref 8.5–10.1)
CALCIUM SERPL-MCNC: 7.8 MG/DL (ref 8.5–10.1)
CALCIUM SERPL-MCNC: 8.4 MG/DL (ref 8.5–10.1)
CALCIUM SERPL-MCNC: 8.6 MG/DL (ref 8.5–10.1)
CHLORIDE SERPL-SCNC: 104 MMOL/L (ref 94–109)
CHLORIDE SERPL-SCNC: 104 MMOL/L (ref 94–109)
CHLORIDE SERPL-SCNC: 106 MMOL/L (ref 94–109)
CHLORIDE SERPL-SCNC: 106 MMOL/L (ref 94–109)
CO2 SERPL-SCNC: 23 MMOL/L (ref 20–32)
CO2 SERPL-SCNC: 23 MMOL/L (ref 20–32)
CO2 SERPL-SCNC: 24 MMOL/L (ref 20–32)
CO2 SERPL-SCNC: 25 MMOL/L (ref 20–32)
COPATH REPORT: NORMAL
CREAT SERPL-MCNC: 2.71 MG/DL (ref 0.66–1.25)
CREAT SERPL-MCNC: 2.82 MG/DL (ref 0.66–1.25)
CREAT SERPL-MCNC: 3.03 MG/DL (ref 0.66–1.25)
CREAT SERPL-MCNC: 3.42 MG/DL (ref 0.66–1.25)
CRP SERPL-MCNC: 270 MG/L (ref 0–8)
DIFFERENTIAL METHOD BLD: ABNORMAL
DIFFERENTIAL METHOD BLD: ABNORMAL
EOSINOPHIL # BLD AUTO: 0 10E9/L (ref 0–0.7)
EOSINOPHIL # BLD AUTO: 0 10E9/L (ref 0–0.7)
EOSINOPHIL NFR BLD AUTO: 0 %
EOSINOPHIL NFR BLD AUTO: 0.2 %
ERYTHROCYTE [DISTWIDTH] IN BLOOD BY AUTOMATED COUNT: 19.9 % (ref 10–15)
ERYTHROCYTE [DISTWIDTH] IN BLOOD BY AUTOMATED COUNT: 20.2 % (ref 10–15)
ERYTHROCYTE [DISTWIDTH] IN BLOOD BY AUTOMATED COUNT: 20.4 % (ref 10–15)
FACT X ACT/NOR PPP CHRO: 34 % (ref 70–130)
GFR SERPL CREATININE-BSD FRML MDRD: 17 ML/MIN/{1.73_M2}
GFR SERPL CREATININE-BSD FRML MDRD: 20 ML/MIN/{1.73_M2}
GFR SERPL CREATININE-BSD FRML MDRD: 22 ML/MIN/{1.73_M2}
GFR SERPL CREATININE-BSD FRML MDRD: 23 ML/MIN/{1.73_M2}
GLUCOSE BLDC GLUCOMTR-MCNC: 66 MG/DL (ref 70–99)
GLUCOSE BLDC GLUCOMTR-MCNC: 79 MG/DL (ref 70–99)
GLUCOSE BLDC GLUCOMTR-MCNC: 80 MG/DL (ref 70–99)
GLUCOSE BLDC GLUCOMTR-MCNC: 84 MG/DL (ref 70–99)
GLUCOSE BLDC GLUCOMTR-MCNC: 85 MG/DL (ref 70–99)
GLUCOSE BLDC GLUCOMTR-MCNC: 87 MG/DL (ref 70–99)
GLUCOSE BLDC GLUCOMTR-MCNC: 87 MG/DL (ref 70–99)
GLUCOSE BLDC GLUCOMTR-MCNC: 89 MG/DL (ref 70–99)
GLUCOSE BLDC GLUCOMTR-MCNC: 91 MG/DL (ref 70–99)
GLUCOSE BLDC GLUCOMTR-MCNC: 91 MG/DL (ref 70–99)
GLUCOSE BLDC GLUCOMTR-MCNC: 92 MG/DL (ref 70–99)
GLUCOSE BLDC GLUCOMTR-MCNC: 93 MG/DL (ref 70–99)
GLUCOSE BLDC GLUCOMTR-MCNC: 95 MG/DL (ref 70–99)
GLUCOSE BLDC GLUCOMTR-MCNC: 97 MG/DL (ref 70–99)
GLUCOSE SERPL-MCNC: 77 MG/DL (ref 70–99)
GLUCOSE SERPL-MCNC: 94 MG/DL (ref 70–99)
GLUCOSE SERPL-MCNC: 94 MG/DL (ref 70–99)
GLUCOSE SERPL-MCNC: 99 MG/DL (ref 70–99)
HCO3 BLD-SCNC: 22 MMOL/L (ref 21–28)
HCO3 BLD-SCNC: 23 MMOL/L (ref 21–28)
HCO3 BLDV-SCNC: 21 MMOL/L (ref 21–28)
HCO3 BLDV-SCNC: 22 MMOL/L (ref 21–28)
HCO3 BLDV-SCNC: 23 MMOL/L (ref 21–28)
HCO3 BLDV-SCNC: 24 MMOL/L (ref 21–28)
HCO3 BLDV-SCNC: 25 MMOL/L (ref 21–28)
HCT VFR BLD AUTO: 27.9 % (ref 40–53)
HCT VFR BLD AUTO: 28.7 % (ref 40–53)
HCT VFR BLD AUTO: 28.8 % (ref 40–53)
HGB BLD-MCNC: 8.5 G/DL (ref 13.3–17.7)
HGB BLD-MCNC: 8.7 G/DL (ref 13.3–17.7)
HGB BLD-MCNC: 8.8 G/DL (ref 13.3–17.7)
IMM GRANULOCYTES # BLD: 0.3 10E9/L (ref 0–0.4)
IMM GRANULOCYTES # BLD: 0.4 10E9/L (ref 0–0.4)
IMM GRANULOCYTES NFR BLD: 1.9 %
IMM GRANULOCYTES NFR BLD: 1.9 %
INR PPP: 2.67 (ref 0.86–1.14)
INR PPP: 4.49 (ref 0.86–1.14)
INTERPRETATION ECG - MUSE: NORMAL
LACTATE BLD-SCNC: 1.2 MMOL/L (ref 0.7–2)
LACTATE BLD-SCNC: 1.3 MMOL/L (ref 0.7–2)
LACTATE BLD-SCNC: 1.4 MMOL/L (ref 0.7–2)
LACTATE BLD-SCNC: 1.5 MMOL/L (ref 0.7–2)
LDH SERPL L TO P-CCNC: 1597 U/L (ref 85–227)
LDH SERPL L TO P-CCNC: 2678 U/L (ref 85–227)
LYMPHOCYTES # BLD AUTO: 0.2 10E9/L (ref 0.8–5.3)
LYMPHOCYTES # BLD AUTO: 0.3 10E9/L (ref 0.8–5.3)
LYMPHOCYTES NFR BLD AUTO: 1.3 %
LYMPHOCYTES NFR BLD AUTO: 1.5 %
MAGNESIUM SERPL-MCNC: 2.4 MG/DL (ref 1.6–2.3)
MAGNESIUM SERPL-MCNC: 2.5 MG/DL (ref 1.6–2.3)
MCH RBC QN AUTO: 21.9 PG (ref 26.5–33)
MCH RBC QN AUTO: 22 PG (ref 26.5–33)
MCH RBC QN AUTO: 22 PG (ref 26.5–33)
MCHC RBC AUTO-ENTMCNC: 30.2 G/DL (ref 31.5–36.5)
MCHC RBC AUTO-ENTMCNC: 30.5 G/DL (ref 31.5–36.5)
MCHC RBC AUTO-ENTMCNC: 30.7 G/DL (ref 31.5–36.5)
MCV RBC AUTO: 72 FL (ref 78–100)
MCV RBC AUTO: 72 FL (ref 78–100)
MCV RBC AUTO: 73 FL (ref 78–100)
MONOCYTES # BLD AUTO: 0.5 10E9/L (ref 0–1.3)
MONOCYTES # BLD AUTO: 0.7 10E9/L (ref 0–1.3)
MONOCYTES NFR BLD AUTO: 3.3 %
MONOCYTES NFR BLD AUTO: 3.5 %
NEUTROPHILS # BLD AUTO: 14.6 10E9/L (ref 1.6–8.3)
NEUTROPHILS # BLD AUTO: 17.3 10E9/L (ref 1.6–8.3)
NEUTROPHILS NFR BLD AUTO: 92.7 %
NEUTROPHILS NFR BLD AUTO: 93.4 %
NRBC # BLD AUTO: 0.1 10*3/UL
NRBC # BLD AUTO: 0.1 10*3/UL
NRBC BLD AUTO-RTO: 0 /100
NRBC BLD AUTO-RTO: 0 /100
O2/TOTAL GAS SETTING VFR VENT: 40 %
O2/TOTAL GAS SETTING VFR VENT: 50 %
OXYHGB MFR BLDV: 43 %
OXYHGB MFR BLDV: 46 %
OXYHGB MFR BLDV: 49 %
OXYHGB MFR BLDV: 50 %
OXYHGB MFR BLDV: 51 %
PCO2 BLD: 35 MM HG (ref 35–45)
PCO2 BLD: 36 MM HG (ref 35–45)
PCO2 BLD: 37 MM HG (ref 35–45)
PCO2 BLD: 37 MM HG (ref 35–45)
PCO2 BLDV: 34 MM HG (ref 40–50)
PCO2 BLDV: 38 MM HG (ref 40–50)
PCO2 BLDV: 39 MM HG (ref 40–50)
PCO2 BLDV: 42 MM HG (ref 40–50)
PCO2 BLDV: 44 MM HG (ref 40–50)
PH BLD: 7.4 PH (ref 7.35–7.45)
PH BLD: 7.4 PH (ref 7.35–7.45)
PH BLD: 7.41 PH (ref 7.35–7.45)
PH BLD: 7.41 PH (ref 7.35–7.45)
PH BLDV: 7.37 PH (ref 7.32–7.43)
PH BLDV: 7.38 PH (ref 7.32–7.43)
PH BLDV: 7.39 PH (ref 7.32–7.43)
PHOSPHATE SERPL-MCNC: 5.2 MG/DL (ref 2.5–4.5)
PHOSPHATE SERPL-MCNC: 6.3 MG/DL (ref 2.5–4.5)
PLATELET # BLD AUTO: 235 10E9/L (ref 150–450)
PLATELET # BLD AUTO: 258 10E9/L (ref 150–450)
PLATELET # BLD AUTO: 281 10E9/L (ref 150–450)
PO2 BLD: 122 MM HG (ref 80–105)
PO2 BLD: 145 MM HG (ref 80–105)
PO2 BLD: 86 MM HG (ref 80–105)
PO2 BLD: 88 MM HG (ref 80–105)
PO2 BLDV: 29 MM HG (ref 25–47)
PO2 BLDV: 31 MM HG (ref 25–47)
PO2 BLDV: 32 MM HG (ref 25–47)
POTASSIUM SERPL-SCNC: 4.5 MMOL/L (ref 3.4–5.3)
POTASSIUM SERPL-SCNC: 4.6 MMOL/L (ref 3.4–5.3)
PROT SERPL-MCNC: 6.2 G/DL (ref 6.8–8.8)
PROT SERPL-MCNC: 6.4 G/DL (ref 6.8–8.8)
PROT SERPL-MCNC: 6.7 G/DL (ref 6.8–8.8)
PROT SERPL-MCNC: 6.7 G/DL (ref 6.8–8.8)
RBC # BLD AUTO: 3.86 10E12/L (ref 4.4–5.9)
RBC # BLD AUTO: 3.96 10E12/L (ref 4.4–5.9)
RBC # BLD AUTO: 4.01 10E12/L (ref 4.4–5.9)
RETICS # AUTO: 35.9 10E9/L (ref 25–95)
RETICS/RBC NFR AUTO: 0.9 % (ref 0.5–2)
SODIUM SERPL-SCNC: 134 MMOL/L (ref 133–144)
SODIUM SERPL-SCNC: 136 MMOL/L (ref 133–144)
SODIUM SERPL-SCNC: 137 MMOL/L (ref 133–144)
SODIUM SERPL-SCNC: 137 MMOL/L (ref 133–144)
WBC # BLD AUTO: 13.8 10E9/L (ref 4–11)
WBC # BLD AUTO: 15.7 10E9/L (ref 4–11)
WBC # BLD AUTO: 18.7 10E9/L (ref 4–11)

## 2021-02-17 PROCEDURE — 85045 AUTOMATED RETICULOCYTE COUNT: CPT | Performed by: INTERNAL MEDICINE

## 2021-02-17 PROCEDURE — 250N000009 HC RX 250: Performed by: INTERNAL MEDICINE

## 2021-02-17 PROCEDURE — 83605 ASSAY OF LACTIC ACID: CPT | Performed by: STUDENT IN AN ORGANIZED HEALTH CARE EDUCATION/TRAINING PROGRAM

## 2021-02-17 PROCEDURE — 83735 ASSAY OF MAGNESIUM: CPT | Performed by: INTERNAL MEDICINE

## 2021-02-17 PROCEDURE — 84100 ASSAY OF PHOSPHORUS: CPT | Performed by: STUDENT IN AN ORGANIZED HEALTH CARE EDUCATION/TRAINING PROGRAM

## 2021-02-17 PROCEDURE — 95714 VEEG EA 12-26 HR UNMNTR: CPT

## 2021-02-17 PROCEDURE — 250N000011 HC RX IP 250 OP 636: Performed by: STUDENT IN AN ORGANIZED HEALTH CARE EDUCATION/TRAINING PROGRAM

## 2021-02-17 PROCEDURE — 99291 CRITICAL CARE FIRST HOUR: CPT | Mod: GC | Performed by: INTERNAL MEDICINE

## 2021-02-17 PROCEDURE — 999N001086 HC STATISTIC MORPHOLOGY W/INTERP HEMEPATH TC 85060: Performed by: INTERNAL MEDICINE

## 2021-02-17 PROCEDURE — 200N000002 HC R&B ICU UMMC

## 2021-02-17 PROCEDURE — 87040 BLOOD CULTURE FOR BACTERIA: CPT | Performed by: INTERNAL MEDICINE

## 2021-02-17 PROCEDURE — 258N000003 HC RX IP 258 OP 636: Performed by: INTERNAL MEDICINE

## 2021-02-17 PROCEDURE — 94003 VENT MGMT INPAT SUBQ DAY: CPT

## 2021-02-17 PROCEDURE — 85260 CLOT FACTOR X STUART-POWER: CPT | Performed by: STUDENT IN AN ORGANIZED HEALTH CARE EDUCATION/TRAINING PROGRAM

## 2021-02-17 PROCEDURE — 83615 LACTATE (LD) (LDH) ENZYME: CPT | Performed by: INTERNAL MEDICINE

## 2021-02-17 PROCEDURE — 999N001017 HC STATISTIC GLUCOSE BY METER IP

## 2021-02-17 PROCEDURE — 85610 PROTHROMBIN TIME: CPT | Performed by: STUDENT IN AN ORGANIZED HEALTH CARE EDUCATION/TRAINING PROGRAM

## 2021-02-17 PROCEDURE — 999N000015 HC STATISTIC ARTERIAL MONITORING DAILY

## 2021-02-17 PROCEDURE — 250N000013 HC RX MED GY IP 250 OP 250 PS 637: Performed by: STUDENT IN AN ORGANIZED HEALTH CARE EDUCATION/TRAINING PROGRAM

## 2021-02-17 PROCEDURE — 250N000011 HC RX IP 250 OP 636

## 2021-02-17 PROCEDURE — 999N000155 HC STATISTIC RAPCV CVP MONITORING

## 2021-02-17 PROCEDURE — 99233 SBSQ HOSP IP/OBS HIGH 50: CPT | Performed by: INTERNAL MEDICINE

## 2021-02-17 PROCEDURE — 250N000013 HC RX MED GY IP 250 OP 250 PS 637: Performed by: INTERNAL MEDICINE

## 2021-02-17 PROCEDURE — 82805 BLOOD GASES W/O2 SATURATION: CPT | Performed by: STUDENT IN AN ORGANIZED HEALTH CARE EDUCATION/TRAINING PROGRAM

## 2021-02-17 PROCEDURE — 84100 ASSAY OF PHOSPHORUS: CPT | Performed by: INTERNAL MEDICINE

## 2021-02-17 PROCEDURE — 36415 COLL VENOUS BLD VENIPUNCTURE: CPT | Performed by: INTERNAL MEDICINE

## 2021-02-17 PROCEDURE — 71045 X-RAY EXAM CHEST 1 VIEW: CPT | Mod: 26 | Performed by: RADIOLOGY

## 2021-02-17 PROCEDURE — 82330 ASSAY OF CALCIUM: CPT | Performed by: STUDENT IN AN ORGANIZED HEALTH CARE EDUCATION/TRAINING PROGRAM

## 2021-02-17 PROCEDURE — 999N000065 XR CHEST PORT 1 VW

## 2021-02-17 PROCEDURE — 85027 COMPLETE CBC AUTOMATED: CPT | Performed by: INTERNAL MEDICINE

## 2021-02-17 PROCEDURE — 85025 COMPLETE CBC W/AUTO DIFF WBC: CPT | Performed by: STUDENT IN AN ORGANIZED HEALTH CARE EDUCATION/TRAINING PROGRAM

## 2021-02-17 PROCEDURE — 85060 BLOOD SMEAR INTERPRETATION: CPT | Mod: 26 | Performed by: PATHOLOGY

## 2021-02-17 PROCEDURE — 83735 ASSAY OF MAGNESIUM: CPT | Performed by: STUDENT IN AN ORGANIZED HEALTH CARE EDUCATION/TRAINING PROGRAM

## 2021-02-17 PROCEDURE — 85610 PROTHROMBIN TIME: CPT | Performed by: INTERNAL MEDICINE

## 2021-02-17 PROCEDURE — 80053 COMPREHEN METABOLIC PANEL: CPT | Performed by: INTERNAL MEDICINE

## 2021-02-17 PROCEDURE — 999N000045 HC STATISTIC DAILY SWAN MONITORING

## 2021-02-17 PROCEDURE — 999N000157 HC STATISTIC RCP TIME EA 10 MIN

## 2021-02-17 PROCEDURE — 258N000001 HC RX 258: Performed by: STUDENT IN AN ORGANIZED HEALTH CARE EDUCATION/TRAINING PROGRAM

## 2021-02-17 PROCEDURE — 71045 X-RAY EXAM CHEST 1 VIEW: CPT

## 2021-02-17 PROCEDURE — 83615 LACTATE (LD) (LDH) ENZYME: CPT | Performed by: STUDENT IN AN ORGANIZED HEALTH CARE EDUCATION/TRAINING PROGRAM

## 2021-02-17 PROCEDURE — 82803 BLOOD GASES ANY COMBINATION: CPT | Performed by: STUDENT IN AN ORGANIZED HEALTH CARE EDUCATION/TRAINING PROGRAM

## 2021-02-17 PROCEDURE — 99233 SBSQ HOSP IP/OBS HIGH 50: CPT | Mod: GC | Performed by: INTERNAL MEDICINE

## 2021-02-17 PROCEDURE — 258N000003 HC RX IP 258 OP 636: Performed by: STUDENT IN AN ORGANIZED HEALTH CARE EDUCATION/TRAINING PROGRAM

## 2021-02-17 PROCEDURE — 250N000011 HC RX IP 250 OP 636: Performed by: INTERNAL MEDICINE

## 2021-02-17 PROCEDURE — 80053 COMPREHEN METABOLIC PANEL: CPT | Performed by: STUDENT IN AN ORGANIZED HEALTH CARE EDUCATION/TRAINING PROGRAM

## 2021-02-17 PROCEDURE — 85730 THROMBOPLASTIN TIME PARTIAL: CPT | Performed by: STUDENT IN AN ORGANIZED HEALTH CARE EDUCATION/TRAINING PROGRAM

## 2021-02-17 PROCEDURE — 272N000078 HC NUTRITION PRODUCT INTERMEDIATE LITER

## 2021-02-17 PROCEDURE — 90947 DIALYSIS REPEATED EVAL: CPT

## 2021-02-17 PROCEDURE — 85025 COMPLETE CBC W/AUTO DIFF WBC: CPT | Performed by: INTERNAL MEDICINE

## 2021-02-17 PROCEDURE — 95720 EEG PHY/QHP EA INCR W/VEEG: CPT | Mod: GC | Performed by: PSYCHIATRY & NEUROLOGY

## 2021-02-17 RX ORDER — AMOXICILLIN 250 MG
2 CAPSULE ORAL 2 TIMES DAILY
Status: DISCONTINUED | OUTPATIENT
Start: 2021-02-17 | End: 2021-03-17 | Stop reason: HOSPADM

## 2021-02-17 RX ORDER — B COMPLEX C NO.10/FOLIC ACID 900MCG/5ML
5 LIQUID (ML) ORAL DAILY
Status: DISCONTINUED | OUTPATIENT
Start: 2021-02-17 | End: 2021-02-24 | Stop reason: ALTCHOICE

## 2021-02-17 RX ORDER — DEXTROSE MONOHYDRATE 100 MG/ML
INJECTION, SOLUTION INTRAVENOUS CONTINUOUS PRN
Status: DISCONTINUED | OUTPATIENT
Start: 2021-02-17 | End: 2021-03-17 | Stop reason: HOSPADM

## 2021-02-17 RX ORDER — AMINO AC/PROTEIN HYDR/WHEY PRO 10G-100/30
1 LIQUID (ML) ORAL 4 TIMES DAILY
Status: DISCONTINUED | OUTPATIENT
Start: 2021-02-17 | End: 2021-02-24 | Stop reason: CLARIF

## 2021-02-17 RX ADMIN — CALCIUM CHLORIDE, MAGNESIUM CHLORIDE, SODIUM CHLORIDE, SODIUM BICARBONATE, POTASSIUM CHLORIDE AND SODIUM PHOSPHATE DIBASIC DIHYDRATE 14 ML/KG/HR: 3.68; 3.05; 6.34; 3.09; .314; .187 INJECTION INTRAVENOUS at 07:55

## 2021-02-17 RX ADMIN — CALCIUM CHLORIDE, MAGNESIUM CHLORIDE, SODIUM CHLORIDE, SODIUM BICARBONATE, POTASSIUM CHLORIDE AND SODIUM PHOSPHATE DIBASIC DIHYDRATE 12.5 ML/KG/HR: 3.68; 3.05; 6.34; 3.09; .314; .187 INJECTION INTRAVENOUS at 15:32

## 2021-02-17 RX ADMIN — FLUOXETINE 30 MG: 20 CAPSULE ORAL at 07:49

## 2021-02-17 RX ADMIN — DOBUTAMINE HYDROCHLORIDE 2.5 MCG/KG/MIN: 200 INJECTION INTRAVENOUS at 08:08

## 2021-02-17 RX ADMIN — OMEPRAZOLE 20 MG: KIT at 07:50

## 2021-02-17 RX ADMIN — FINASTERIDE 5 MG: 5 TABLET, FILM COATED ORAL at 07:49

## 2021-02-17 RX ADMIN — CALCIUM CHLORIDE, MAGNESIUM CHLORIDE, SODIUM CHLORIDE, SODIUM BICARBONATE, POTASSIUM CHLORIDE AND SODIUM PHOSPHATE DIBASIC DIHYDRATE 12.5 ML/KG/HR: 3.68; 3.05; 6.34; 3.09; .314; .187 INJECTION INTRAVENOUS at 22:56

## 2021-02-17 RX ADMIN — Medication 5 ML: at 14:40

## 2021-02-17 RX ADMIN — CALCIUM CHLORIDE, MAGNESIUM CHLORIDE, SODIUM CHLORIDE, SODIUM BICARBONATE, POTASSIUM CHLORIDE AND SODIUM PHOSPHATE DIBASIC DIHYDRATE 14 ML/KG/HR: 3.68; 3.05; 6.34; 3.09; .314; .187 INJECTION INTRAVENOUS at 04:11

## 2021-02-17 RX ADMIN — CALCIUM CHLORIDE, MAGNESIUM CHLORIDE, SODIUM CHLORIDE, SODIUM BICARBONATE, POTASSIUM CHLORIDE AND SODIUM PHOSPHATE DIBASIC DIHYDRATE 12.5 ML/KG/HR: 3.68; 3.05; 6.34; 3.09; .314; .187 INJECTION INTRAVENOUS at 08:12

## 2021-02-17 RX ADMIN — PHYTONADIONE 2 MG: 10 INJECTION, EMULSION INTRAMUSCULAR; INTRAVENOUS; SUBCUTANEOUS at 10:40

## 2021-02-17 RX ADMIN — CALCIUM CHLORIDE, MAGNESIUM CHLORIDE, SODIUM CHLORIDE, SODIUM BICARBONATE, POTASSIUM CHLORIDE AND SODIUM PHOSPHATE DIBASIC DIHYDRATE 2 ML/KG/HR: 3.68; 3.05; 6.34; 3.09; .314; .187 INJECTION INTRAVENOUS at 11:11

## 2021-02-17 RX ADMIN — CEFEPIME HYDROCHLORIDE 2 G: 2 INJECTION, POWDER, FOR SOLUTION INTRAVENOUS at 13:27

## 2021-02-17 RX ADMIN — CEFEPIME HYDROCHLORIDE 2 G: 2 INJECTION, POWDER, FOR SOLUTION INTRAVENOUS at 06:09

## 2021-02-17 RX ADMIN — AZITHROMYCIN MONOHYDRATE 500 MG: 500 INJECTION, POWDER, LYOPHILIZED, FOR SOLUTION INTRAVENOUS at 08:08

## 2021-02-17 RX ADMIN — ASPIRIN 81 MG CHEWABLE TABLET 81 MG: 81 TABLET CHEWABLE at 07:50

## 2021-02-17 RX ADMIN — CALCIUM CHLORIDE, MAGNESIUM CHLORIDE, SODIUM CHLORIDE, SODIUM BICARBONATE, POTASSIUM CHLORIDE AND SODIUM PHOSPHATE DIBASIC DIHYDRATE 14 ML/KG/HR: 3.68; 3.05; 6.34; 3.09; .314; .187 INJECTION INTRAVENOUS at 11:11

## 2021-02-17 RX ADMIN — CALCIUM CHLORIDE, MAGNESIUM CHLORIDE, SODIUM CHLORIDE, SODIUM BICARBONATE, POTASSIUM CHLORIDE AND SODIUM PHOSPHATE DIBASIC DIHYDRATE 14 ML/KG/HR: 3.68; 3.05; 6.34; 3.09; .314; .187 INJECTION INTRAVENOUS at 00:20

## 2021-02-17 RX ADMIN — CALCIUM CHLORIDE, MAGNESIUM CHLORIDE, SODIUM CHLORIDE, SODIUM BICARBONATE, POTASSIUM CHLORIDE AND SODIUM PHOSPHATE DIBASIC DIHYDRATE 14 ML/KG/HR: 3.68; 3.05; 6.34; 3.09; .314; .187 INJECTION INTRAVENOUS at 21:58

## 2021-02-17 RX ADMIN — CALCIUM CHLORIDE, MAGNESIUM CHLORIDE, SODIUM CHLORIDE, SODIUM BICARBONATE, POTASSIUM CHLORIDE AND SODIUM PHOSPHATE DIBASIC DIHYDRATE 14 ML/KG/HR: 3.68; 3.05; 6.34; 3.09; .314; .187 INJECTION INTRAVENOUS at 18:18

## 2021-02-17 RX ADMIN — BIVALIRUDIN 0.02 MG/KG/HR: 250 INJECTION INTRACAVERNOUS at 05:42

## 2021-02-17 RX ADMIN — VANCOMYCIN HYDROCHLORIDE 2000 MG: 10 INJECTION, POWDER, LYOPHILIZED, FOR SOLUTION INTRAVENOUS at 21:57

## 2021-02-17 RX ADMIN — CALCIUM CHLORIDE, MAGNESIUM CHLORIDE, SODIUM CHLORIDE, SODIUM BICARBONATE, POTASSIUM CHLORIDE AND SODIUM PHOSPHATE DIBASIC DIHYDRATE 14 ML/KG/HR: 3.68; 3.05; 6.34; 3.09; .314; .187 INJECTION INTRAVENOUS at 14:46

## 2021-02-17 RX ADMIN — ALLOPURINOL 200 MG: 100 TABLET ORAL at 07:50

## 2021-02-17 RX ADMIN — Medication 1 PACKET: at 16:27

## 2021-02-17 RX ADMIN — DOCUSATE SODIUM 50 MG AND SENNOSIDES 8.6 MG 2 TABLET: 8.6; 5 TABLET, FILM COATED ORAL at 11:42

## 2021-02-17 RX ADMIN — CALCIUM CHLORIDE, MAGNESIUM CHLORIDE, SODIUM CHLORIDE, SODIUM BICARBONATE, POTASSIUM CHLORIDE AND SODIUM PHOSPHATE DIBASIC DIHYDRATE 12.5 ML/KG/HR: 3.68; 3.05; 6.34; 3.09; .314; .187 INJECTION INTRAVENOUS at 00:20

## 2021-02-17 RX ADMIN — DOCUSATE SODIUM 50 MG AND SENNOSIDES 8.6 MG 2 TABLET: 8.6; 5 TABLET, FILM COATED ORAL at 19:32

## 2021-02-17 RX ADMIN — CALCIUM CHLORIDE, MAGNESIUM CHLORIDE, SODIUM CHLORIDE, SODIUM BICARBONATE, POTASSIUM CHLORIDE AND SODIUM PHOSPHATE DIBASIC DIHYDRATE 12.5 ML/KG/HR: 3.68; 3.05; 6.34; 3.09; .314; .187 INJECTION INTRAVENOUS at 04:11

## 2021-02-17 RX ADMIN — Medication 1 PACKET: at 19:32

## 2021-02-17 RX ADMIN — BIVALIRUDIN 0.02 MG/KG/HR: 250 INJECTION INTRACAVERNOUS at 21:05

## 2021-02-17 RX ADMIN — CALCIUM CHLORIDE, MAGNESIUM CHLORIDE, SODIUM CHLORIDE, SODIUM BICARBONATE, POTASSIUM CHLORIDE AND SODIUM PHOSPHATE DIBASIC DIHYDRATE 12.5 ML/KG/HR: 3.68; 3.05; 6.34; 3.09; .314; .187 INJECTION INTRAVENOUS at 11:12

## 2021-02-17 RX ADMIN — DEXTROSE MONOHYDRATE 25 ML: 500 INJECTION PARENTERAL at 06:21

## 2021-02-17 RX ADMIN — CALCIUM CHLORIDE 1 G: 100 INJECTION, SOLUTION INTRAVENOUS at 20:03

## 2021-02-17 RX ADMIN — Medication 1 PACKET: at 14:38

## 2021-02-17 RX ADMIN — DEXTROSE MONOHYDRATE 1000 ML: 100 INJECTION, SOLUTION INTRAVENOUS at 06:55

## 2021-02-17 RX ADMIN — CALCIUM CHLORIDE, MAGNESIUM CHLORIDE, SODIUM CHLORIDE, SODIUM BICARBONATE, POTASSIUM CHLORIDE AND SODIUM PHOSPHATE DIBASIC DIHYDRATE 12.5 ML/KG/HR: 3.68; 3.05; 6.34; 3.09; .314; .187 INJECTION INTRAVENOUS at 19:10

## 2021-02-17 ASSESSMENT — ACTIVITIES OF DAILY LIVING (ADL)
ADLS_ACUITY_SCORE: 16

## 2021-02-17 NOTE — PROGRESS NOTES
[facility] ID Service: Follow Up Note      Patient:  Eliseo Tanner   Date of birth 1955, Medical record number [id number]  Date of Visit:  02/17/2021  Date of Admission: 2/15/2021         Assessment and Recommendations:   ID Problem List:  1. Mixed septic + cardiogenic shock with severe RV failure  2. Severe Legionella pneumophilia pneumonia (serogroup 1)  3. MSSA drive line infection  4. Oliguric BERNARDA on CKD requiring CRRT  5. Transaminitis  6. Leukocytosis  7. 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 History of NICM s/p HM III (2/18/20), ICD placement (3/16/20)      Recommendations:  1. Continue azithromycin 500mg IV daily   2. Continue cefepime, renally dosed for CRRT  3. Blood, sputum, and legionella cultures pending  4. 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 CXR and CT chest was significant for bilateral consolidative opacities-right worse then left. Urine legionella antigen was positive for pneumophilia serogroup 1.  COVID 19, RVP and influenza testing negative. Supporting evidence for legionella pneumonia in this circumstance includes CT findings, hyponatremia, transaminitis, CRP elevation and positive urine antigen.  Underlying risk factor for severity of disease is age, underlying cardiovascular and renal disease.     The driveline infection overall seems stable based on imaging and that the wife noted improved driveline drainage after increasing cephalexin dose ~ 1 week ago. No evidence of bacteremia. While in the  "hospital will keep on IV antibiotics to get burden of infection down.      Recs were discussed with primary team today. Don't hesitate to call with questions.     Attestation:  I have reviewed today's vital signs, medications, labs and imaging.  Anaid Redding PA-C, Pager # [phone]            Interval History:       Fever curve improving, afebrile this morning. WBC downtrending, pressor needs decreasing. Remains on CRRT with minimal urine output so far today. Noe marliner on. Wife at bedside. On video EEG monitoring.         Review of Systems:   Unable to obtain, patient sedated and intubated in ICU.          Current Antimicrobials   Current:  - Azithromycin (start 2/16)  - Cefepime (start 2/16)    Prior:  - Zosyn (2/15-2/16)  - Vancomycin (2/15-2/16)        History of Present Illness:   Per initial ID consult on 2/16/21:  \"Patient is known to me from the outpatient setting. I last had a telephone visit with him on 2/4 for a ongoing drive line infection. At this time I increased his suppression cephalexin from 500 mg po q 12 hours to 500 mg po q 6 hours. On 2/9 he was seen by VAD coordinator and PA for a device check. Noted to have some drainage from the drive line exit site. Culture revealed MSSA. 2/8 CT a/p revealed stranding along the drive line in the subcutaneous tissue. No discrete fluid collections. Interesting, during this visit the patient felt very good.   Drainage from driveline improved after dose increase of cephalexin.      Received 1st dose of the Moderna covid 19 vaccine on 2/11. 2/12-2/14 noted to have general malaise, decreased appetite. During this time his wife took temp-~99. Noted he had a dry cough but it was not frequent. Patient did not complain of nausea, vomiting or diarrhea.      On the morning of 2/15 he fell walking in the bathroom. Initially went to [facility] and then transferred to [facility]. At [facility] he received a dose of azithromycin and ceftriaxone.  On admission noted to " "be febrile with a leukocytosis and lactic acidosis. After transfer started on empiric pip/tazo and vancomycin. CT chest revealed diffuse bilateral patchy groundglass consolidations (right worse then left), LAD, diffuse mesenteric edema and ascites. Overnight patient decompensated requiring intubation and initiation of CRRT. Urine legionella positive for pneumophilia serogroup 1 antigen. Influenza, RVP and covid 19 pcr negative.\"         Physical Exam:   Ranges for vital signs:  Temp:  [97.2  F (36.2  C)-101.1  F (38.4  C)] 98.4  F (36.9  C)  Pulse:  [] 105  Resp:  [13-24] 20  MAP:  [55 mmHg-98 mmHg] 78 mmHg  Arterial Line BP: ()/(31-82) 85/64  FiO2 (%):  [50 %-100 %] 50 %  SpO2:  [76 %-100 %] 98 %    Intake/Output Summary (Last 24 hours) at 2/17/2021 0847  Last data filed at 2/17/2021 0800  Gross per 24 hour   Intake 1715 ml   Output 3046 ml   Net -1331 ml     Exam:  GENERAL:  Sedated, intubated in ICU.   ENT:  Head is normocephalic, atraumatic. EEG pads in place. Oropharynx is moist, ETT in place  EYES:  Eyes have anicteric sclerae.    LUNGS:  +mechanical vent sounds, coarse bases.  CARDIOVASCULAR:  +LVAD hum.  ABDOMEN:  Soft, non-distended, +bowel sounds.  EXT: Extremities warm and without edema.  SKIN:  No acute rashes.  Bilat internal jugular lines in place without surrounding erythema or purulence.         Laboratory Data:   Reviewed.  Pertinent for:    Culture data:  Microbiology:  Culture Micro   Date Value Ref Range Status   02/17/2021 No growth after 1 hour  Preliminary   02/17/2021 No growth after 1 hour  Preliminary   02/16/2021 Culture negative < 24 hours, reincubate  Preliminary   02/15/2021 Culture in progress  Preliminary   02/15/2021 (A)  Final    Canceled, Test credited  >10 Squamous epithelial cells/low power field indicates oral contamination. Please   recollect.  Notification of test cancellation was given to  Bethanei Murillo RN on 2.15.21 at 2326. JRT     02/15/2021 No growth after 2 " days  Preliminary   02/15/2021 No growth after 2 days  Preliminary   02/08/2021 No growth  Final   02/08/2021 No growth  Final   02/08/2021 Moderate growth  Staphylococcus aureus   (A)  Final   02/08/2021 Moderate growth  Strain 2  Staphylococcus aureus   (A)  Final   02/08/2021 No anaerobes isolated  Final   12/17/2020 No anaerobes isolated  Final   12/17/2020 Light growth  Staphylococcus aureus   (A)  Final   11/17/2020 No growth  Final   11/17/2020 No growth  Final   11/16/2020 No growth  Final   11/16/2020 No growth  Final   11/16/2020 Moderate growth  Staphylococcus aureus   (A)  Final   11/16/2020 Moderate growth  Strain 2  Staphylococcus aureus   (A)  Final   11/15/2020 No growth  Final   11/15/2020 No growth  Final   11/14/2020 (A)  Final    Cultured on the 1st day of incubation:  Staphylococcus aureus     11/14/2020   Final    Critical Value/Significant Value, preliminary result only, called to and read back by  SHA ACE RN 1553 11.15.20 ND     11/14/2020   Final    (Note)  POSITIVE for STAPHYLOCOCCUS AUREUS and NEGATIVE for the mecA gene  (not MRSA) by [employer] multiplex nucleic acid test. The mecA gene was  not detected. Final identification and antimicrobial susceptibility  testing will be verified by standard methods.    Specimen tested with [employer] multiplex, gram-positive blood culture  nucleic acid test for the following targets: Staph aureus, Staph  epidermidis, Staph lugdunensis, other Staph species, Enterococcus  faecalis, Enterococcus faecium, Streptococcus species, S. agalactiae,  S. anginosus grp., S. pneumoniae, S. pyogenes, Listeria sp., mecA  (methicillin resistance) and Ernst/B (vancomycin resistance).    Critical Value/Significant Value called to and read back by Sha Ace Rn [id number] 11.15.20 NDP     11/14/2020 (A)  Final    Cultured on the 1st day of incubation:  Staphylococcus aureus  Susceptibility testing done on previous specimen     11/14/2020   Final    Critical  Value/Significant Value, preliminary result only, called to and read back by  PATRICIO SHANNON RN 2101 11.15.20 NDP     09/21/2020 No growth  Final   09/21/2020 No growth  Final   03/13/2020 No growth  Final   03/13/2020 No growth  Final   03/03/2020 No growth  Final   03/03/2020 No growth  Final   02/22/2020 No growth  Final   02/22/2020 No growth  Final   02/16/2020 No growth  Final   02/16/2020 No growth  Final   02/15/2020 No growth  Final   02/15/2020 (A)  Final    Cultured on the 2nd day of incubation:  Staphylococcus epidermidis     02/15/2020   Final    Critical Value/Significant Value, preliminary result only, called to and read back by  Cee Benavidez RN on 2.16.20 at 2220.  JRT     02/15/2020   Final    (Note)  POSITIVE for STAPHYLOCOCCUS EPIDERMIDIS and POSITIVE for the mecA  gene (resistant to methicillin) by [employer] multiplex nucleic acid  test. Final identification and antimicrobial susceptibility testing  will be verified by standard methods.    Specimen tested with Verigene multiplex, gram-positive blood culture  nucleic acid test for the following targets: Staph aureus, Staph  epidermidis, Staph lugdunensis, other Staph species, Enterococcus  faecalis, Enterococcus faecium, Streptococcus species, S. agalactiae,  S. anginosus grp., S. pneumoniae, S. pyogenes, Listeria sp., mecA  (methicillin resistance) and Ernst/B (vancomycin resistance).    Critical Value/Significant Value called to and read back by Cee Benavidez RN, 2.17.20 @ 0207 pt.     02/14/2020 No growth  Final   02/13/2020 (A)  Final    Cultured on the 1st day of incubation:  Proteus mirabilis  Susceptibility testing done on previous specimen     02/13/2020   Final    Critical Value/Significant Value, preliminary result only, called to and read back by  Mima Cabrera RN. @1426. 2.13.20. BS.      02/13/2020 (A)  Final    Cultured on the 1st day of incubation:  Enterococcus faecalis  Susceptibility testing done on previous specimen      02/13/2020   Final    Critical Value/Significant Value, preliminary result only, called to and read back by  Alisson Castillo RN on 2.13.20 at 1728.  JRT     02/13/2020   Final    (Note)  POSITIVE for ENTEROCOCCUS FAECALIS and NEGATIVE for Ernst/vanB genes  by [employer] multiplex nucleic acid test. Final identification and  antimicrobial susceptibility testing will be verified by standard  methods.    Specimen tested with [employer]ig[employer] multiplex, gram-positive blood culture  nucleic acid test for the following targets: Staph aureus, Staph  epidermidis, Staph lugdunensis, other Staph species, Enterococcus  faecalis, Enterococcus faecium, Streptococcus species, S. agalactiae,  S. anginosus grp., S. pneumoniae, S. pyogenes, Listeria sp., mecA  (methicillin resistance) and Ernst/B (vancomycin resistance).    Critical Value/Significant Value called to and read back by MARIA EUGENIA MILLAN RN 2/13/20 2036 EH         02/13/2020 (A)  Final    Cultured on the 1st day of incubation:  Proteus mirabilis     02/13/2020   Final    Critical Value/Significant Value, preliminary result only, called to and read back by  Mima Cabrera RN. @1426. 2.13.20. BS.      02/13/2020 (A)  Final    Cultured on the 1st day of incubation:  Enterococcus faecalis     02/13/2020   Final    Critical Value/Significant Value, preliminary result only, called to and read back by  Alisson Leon RN on 2.13.20 at 1728.  JRT     02/13/2020   Final    (Note)  POSITIVE for PROTEUS SPECIES by [employer] multiplex nucleic acid test.  Final identification and antimicrobial susceptibility testing will be  verified by standard methods.    Specimen tested with Verig[employer] multiplex, gram-negative blood culture  nucleic acid test for the following targets: Acinetobacter sp.,  Citrobacter sp., Enterobacter sp., Proteus sp., E. coli, K.  pneumoniae/oxytoca, P. aeruginosa, and the following resistance  markers: CTXM, KPC, NDM, VIM, IMP and OXA.    Critical Value/Significant Value  called to and read back by  Alisson Leon RN @ 1650. 2/13/20. AV     02/13/2020 No growth  Final       Inflammatory Markers    Recent Labs   Lab Test 02/16/21  2219 02/15/21  1910 02/11/21  0838 12/17/20  0756   SED 72* 47*  --   --    .0* 270.0* 8.6 8.0       Hematology Studies    Recent Labs   Lab Test 02/17/21  0350 02/16/21  2219 02/16/21  0215 02/15/21  1910   WBC 18.7* 19.4* 24.2* 24.5*   HGB 8.8* 9.2* 9.6* 9.9*   MCV 72* 72* 73* 74*    264 308 356     Recent Labs   Lab Test 02/17/21  0350 02/16/21  0215 02/15/21  1910 01/05/21  1419   ANEU 17.3* 22.4* 22.4* 6.1   AEOS 0.0 0.0 0.0 0.1       Metabolic Studies     Recent Labs   Lab Test 02/17/21  0350 02/16/21 2219 02/16/21  1633 02/16/21  1234    136 135 132*   POTASSIUM 4.6 4.5 4.3 4.5   CHLORIDE 104 105 101 97   CO2 23 23 22 21   BUN 62* 71* 82* 96*   CR 3.42* 3.78* 4.33* 4.92*   GFRESTIMATED 17* 15* 13* 11*       Hepatic Studies    Recent Labs   Lab Test 02/17/21 0350 02/16/21 2219 02/16/21  1633   BILITOTAL 1.4* 1.3 1.1   ALKPHOS 128 133 134   ALBUMIN 2.4* 2.5* 2.4*   AST 7,648* 8,512* 9,584*   ALT 3,843* 4,165* 4,261*            Imaging:     CXR (2/17/2021)  Findings:   Right internal jugular [place] catheter tip projects over the right  main pulmonary artery. Left internal jugular central venous catheter  tip at the brachiocephalic confluence. Stable left chest wall  implantable cardiac defibrillator and LVAD. Median sternotomy wires.  Endotracheal tube tip at the mid to low thoracic trachea. Enteric tube  sidehole projects over the stomach. Stable enlarged cardiac  silhouette. The pulmonary vasculature is indistinct. Low lung volumes  with streaky perihilar and medial bibasilar opacities. Possible trace  bilateral pleural effusions. No pneumothorax.    CT Chest/abd/pelvis (2/16/21)  IMPRESSION:  1. Overall stable patchy consolidative pulmonary opacities, concerning  for pneumonia.  2. Stable prominent and borderline  enlarged mediastinal lymph nodes,  favored to be reactive.  3. Mild increase in small bilateral pleural effusions, greater on the  right, with associated compressive atelectasis.  4. Overall stable LVAD drive line appearance in the superior abdominal  wall without discrete fluid collection or significant inflammation.  5. Mild increase in diffuse intra-abdominal ascites.    CT Chest/abd/pelvis (2/15/21)  IMPRESSION:   1. Diffuse patchy consolidations throughout both lungs concerning for  an acute infectious process. Interval increase in size of multiple  mediastinal lymph nodes measuring up to 10 mm, likely reactive.  2. Small right-sided pleural effusion and trace left-sided pleural  effusion.  3. Postsurgical changes of LVAD placement with mild inflammatory soft  tissue changes about the drive line traversing the subcutaneous fat  and superior right rectus abdominis musculature, concerning for drive  line infection, similar to prior.   4. Diffuse mesenteric edema and small amount of intra-abdominal  ascites, new from prior. Fluid within the abdomen and pelvis measures  low density.  5. Mild diffuse bladder wall thickening, this can be seen with  cystitis. Recommend clinical correlation.

## 2021-02-17 NOTE — PLAN OF CARE
OT-4e: Hold- pt still not medically appropriate to initiate therapies, will hold OT eval and initiate when appropriate.

## 2021-02-17 NOTE — PROGRESS NOTES
[facility]    Cardiology Progress Note- Cards 2        Date of Admission:  2/15/2021     Today's Plan  - 2mg IV vitamin K  - Continue broad spectrum antibiotics     Vancomycin (2/15/21 -   )   Piperacillin tazobactam (2/15/21-2/16/21) (switched for renal protection)   Cefepime (2/16/21 -    )    Azithromycin (2/15/21 -   )  - continue dobutamine 2.5  - hold Amiodarone gtt  - continue bival  - continue CRRT - goal net negative 2L  - ongoing goals of care discussion  - start tube feeds    Assessment & Plan: SL      Eliseo Tanner is a 65 year old male with PMHx relevant for NICM with LVEF 15-20%, NYHA III s/p HM III 2/18/2020 (post op complicated by retrosternal hematoma with bleeding in the lungs), s/p ICD placed on 3/16/2020, h/o MSSA driveline infection and bacteremia 11/5/2020 on prophylactic cephalexin, h/o VT on amiodarone, moderate nonobstructive CAD, severe MR, atrial fibrillation on warfarin, hypertension, ABHINAV requiring CPAP, CKD IV, DMII, HLP,  h/o HIT who presented to the Cardiovascular Unit (4E Cardiac ICU) with mixed cardiogenic and distributive shock withan intermittent wide complex tachycardia. Stabilized on low dose dobutamine off of pressors. Minimal oxygen requirements with persistent acute renal failure    Cardiovascular:    #Mixed Cardiogenic and Septic Shock  #Multiorgan Failure   Presented from [facility] with subacute development of shortness of breath, dyspnea on exertion, dizziness/lightheadedness with near syncopal event found to be febrile with intermittent wide complex tachycardia. Recent COVID19 moderna vaccination. CT chest showing bilateral infiltrates. Community acquired pneumonia vs. Possible recurrent of MSSA bacteremia WBC 24.5, Procal 7.95, , Lactacte 6.9.    Mildly elevated filling pressures CVP 18, PA 35/20, PCWP 11. Likely some component of cardiogenic shock. LVAD parameters relatively stable  despite markedly elevated LDH 8,531. Euvolemic on exam. Worsening renal function, Cr 4.62, up-trending LFT's/ INR. Legionella antigen positive.     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 Bhatti (ID clinic)   # Troponin elevation (likely demand ischemia)  # Essential Hypertension  # Hyperlipidemia        Patient with long standing history of NICM previously implanted LVAD (HM III) on 02/18/20 due to worsening functional status and systolic ventricular dysfunction (further complicated by RV dysfunction by VAD hemodynamic compensatory mechanism, VT in ICU now on Amiodarone and Atrial Fibrillation with AVR requiring DCCV on 02/28/20).      Elevated cardiac biomarkers on presentation, SGC with only mildly elevated filling pressures. Euvolemic on exam. Troponin elevation likely related to demand ischemia with no concerns for ACS. Most recent VAD interrogation without any significant Flow-Alarms    LDH 8,531. Initial concern for LVAD thrombus. However given relatively stable LVAD parameters, degree of LDH elevation is out of proportion. Will continue to monitor for LVAD alarms.      - continue dobutamine  - Held ACE-I/ARB/ARNi: Lisinopril; due to worsening BERNARDA  - No BB; deferred 2/2 shock  - Aldosterone antagonist: deferred while other medical therapy is optimized  - SCD prophylaxis ICD  - Fluid status : mildly hypervolemic, volume removal as tolerated with CRRT  - MAP Goal: 65-85  - On Warfarin for HM-III INR Goal 2-3  - Hold Hydralazine 100mg given septic shock  -  Continue ASA 81 mg per oral daily       # Wide complex tachycardia. Atrial Flutter vs Atrial flutter vs Ventricular Tachycardia  # History of Atrial Fibrillation s/p DCCV/history of Atrial Flutter       Wide complex tachycardia HR 140s on presentation in the setting of febrile illness and likely pneumonia. Did not initially respond to adenosine. Concern for VT. Intermittently back into HR 60s with underlying rhythm of atrial flutter 3:1 conduction block. Per EP can not rule out VT, may be dual tachycardias.      - hold Amiodarone gtt  - Ensure K+ >4.0 mEq/L and Mg+2 >2.0  - hold warfarin  - continue bivalirudin  - On cardiac telemetry         Infectious disease    #Sepsis  #Legionella Pneuamonia  #Bilateral pulmonary infiltrates  CT showing bilateral diffuse patchy consolidations concerning for infectious process. Low suspicion for recurrent driveline or possible hardware infection (history of MSSA Chronic Driveline Infection). Urine without pyuria. Urine legionella ag positive.   - f/u blood cultures  - f/u sputum legionella culture  - continue broad spectrum antibiotics    Renal:  # Acute on Chronic CKD stage IV likely 2/2 Septic Shock   Creatinine continues uptrending 3.23 > 4.62 > 4.9. LIJ Dialysis placed in anticipation of CRRT.  - start CRRT  - Daily Weight's  - Strict I/O's  - Daily BMP's  - Avoid any additional nephrotoxicity (will consider modifying antimicrobial therapy after infectious work-up is completed)      GI  # Shock Liver   # Congestive Hepatopathy  Hepatoccelular pattern of liver injury  > 3,496, AST 1,441 > 8,490 likely 2/2 to septic shock. INR elevated 2.13 and uptrending despite warfarin being held.   - Trend LFT's   - start Tube feeds  - start bowel regimen     Hematological   # Chronic Microcytic/Hypochromic Anemia (likely related to iron deficiency)  # Coagulopathy related to sepsis   # IgG Kappa monoclonal Gammopathy of undetermined significance     - Monitor Hgb (baseline  8-9g/dL)  - transfuse for Hb < 7.0   - Patient follows with  and [facility] (Dr. Ibarra)     # Previous Concern For HIT  On previous hospitalization for LVAD placement (02/06/20-03/20/20), concern for HIT. Platelets 250K --> ~ 90K wuth documented history of exposure to unfractionate heparin products at OSH prior to his installation at the [facility].    At that time, patient underwent two HIT panel tests (first one was negative and second antibody test was positive). No known thrombosis events. DAVINA antibody was negative raising question about validity of diagnosis . Per hematology at the time (Dr. James), no other cause for isolated thrombocytopenia. Immediate recovery of plts following d/c of heparin. 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     - recommend avoiding heparin products  - start bivalirudin      Endocrine  # Type II DM     > Last Hgb A1C: 6.8 (01/06/21)     - Continue PTA Insulin Basaglar 10 Units Aq at bedtime  - On Medium sliding scale insulin  - Oral Hypoglycemia Protocol      Neurologic:  # Disorientation/Toxic Metabolic Encephalopathy  # Chronic Intracranial Small Vessel Disease        Patient reported some lightheadedness/dizziness and disorientation the days prior to hospitalization while at home. However, no reports of LOC, seizure activity, focal deficits, or findings for acute intracranial pathology on most recent OSH CT head w/o contrast (02/15/21). Now s/p intubation and sedated. Intermittently following commands    - fentanyl and midazolam for pain and sedation  - daily SAT     Diet:   NPO, will start TFs pending hospital course  DVT Prophylaxis: bivalirudin gtt  Guevara Catheter: not present  Code Status: Full Code  Fluids: no IVFs  Lines: RIJ CVC (2/15), L IJ dialysis catheter (2/16), left radial arterial line (2/15)      Disposition Plan   Expected discharge: > 7 days, recommended to transitional care unit once fluid volume status optimized on oral medication,  arrhythmia stabilized , therapeutic anticoagulation achieved and safe disposition plan/ TCU bed available.      Entered: Daniel Fleming MD 2021, 2:10 PM       The patient's care was discussed with the Attending Physician, Dr. Cisneros.    Daniel Fleming MD  [facility]  Please see sign in/sign out for up to date coverage information  ______________________________________________________________________    Interval History   Overnight no acute events. Remains intubated and sedated. Decreasing vent requirements. Started on CRRT  LFTs downtrending. LDH downtrending. Weaned off of norepi and vaso. Opening eyes and following commands overnight.    Heartmate 3 (centrifugal flow) VS  Flow (Lpm): 4.8 Lpm  Pulse Index (PI): 2.2 PI  Speed (rpm): 5500 rpm  Power (muhammad): 4.2 muhammad  Current Hct settin    Data reviewed today: I reviewed all medications, new labs and imaging results over the last 24 hours.      Physical Exam   Vital Signs: Temp: 98.4  F (36.9  C) Temp src: Bladder  Pulse: 106   Resp: 20 SpO2: 98 % O2 Device: Mechanical Ventilator    Weight: 217 lbs 13.03 oz    In general, the patient is intubated sedated in no apparent distress.      Neck: RIJ and LIJ catheters in place.   Heart: RRR. S1 and S2 no appreciated. Mechanical whir. No murmur, rub, click, or gallop.   Lungs: Bilateral rhonchi and rales   GI: Soft, nontender, nondistended.   Extremities: trace edema.  The pulses are 2+at the radial and DP bilaterally.  Neuro: grossly non focal.   Skin: no rashes.        Data   Recent Labs   Lab 21  1205 21  0832 21  0350 21  2219 21  0215 21  0215 02/15/21  1910   WBC  --  15.7* 18.7* 19.4*  --  24.2* 24.5*   HGB  --  8.5* 8.8* 9.2*  --  9.6* 9.9*   MCV  --  72* 72* 72*  --  73* 74*   PLT  --  235 258 264  --  308 356   INR  --   --  4.49*  --   --  2.55* 2.13*     --  134 136   < > 126* 129*   POTASSIUM 4.5  --  4.6 4.5    < > 5.1 5.1   CHLORIDE 106  --  104 105   < > 94 97   CO2 25  --  23 23   < > 17* 16*   BUN 52*  --  62* 71*   < > 84* 78*   CR 3.03*  --  3.42* 3.78*   < > 4.08* 3.23*   ANIONGAP 5  --  7 8   < > 15* 17*   CALOS 7.6*  --  8.6 10.7*   < > 8.0* 8.7   GLC 94  --  77 88   < > 212* 215*   ALBUMIN 2.2*  --  2.4* 2.5*   < > 2.8* 3.0*   PROTTOTAL 6.2*  --  6.7* 7.0   < > 7.6 8.2   BILITOTAL 1.3  --  1.4* 1.3   < > 1.1 1.0   ALKPHOS 119  --  128 133   < > 137 141   ALT 3,488*  --  3,843* 4,165*   < > 2,562* 768*   AST 6,374*  --  7,648* 8,512*   < > 6,019* 1,441*   TROPI  --   --   --   --   --   --  0.377*    < > = values in this interval not displayed.     TTE 2/15/2021  Interpretation Summary  HM 3 at 5500 RPM  LV size is small, LVIDd = 3.0 cm. Severely reduced left ventricular function.  Doppler of the inflow cannula and outflow graft are normal.  RV is severely dilated. Severely reduced right ventricular function.  Mild TR is present.  The aortic valve is closed with mild regurgitation.  IVC is dilated and patient is on a ventilator.  No pericardial effusion present.

## 2021-02-17 NOTE — PLAN OF CARE
Pt sedated and intubated. Pt started having tremors around 1500. MD notified. CT of the head and EGD ordered. Able to start pulling fluids w/ pressors to keep MAP <65. Making anuric. Pt turned and repositioned q2hr for comfort and skin integrity.

## 2021-02-17 NOTE — PROGRESS NOTES
VEEG monitoring preliminary results:    VEEG has been running for over one hour.  EEG showed severe generalized slowing of the background activities. Findings are consistent with severe diffuse encephalopathy.  No epileptiform activities, no clinical or electrographic seizures were observed.    Chad Martinez MD  Neurology

## 2021-02-17 NOTE — PLAN OF CARE
Assessment:   Cardiac: ST RBB. Levophed and Vasopressin weaned off. Dobutamine gtt infusing, see MAR/doc flow sheet. Wedged at 0400 for 12 by Dr. Foss.  LVAD Flow 4.4-4.9, Speed 5500, PI 1.7-6.9 (Dr. Cisneros aware, okay to pull fluid when PI flow), Power 4.1-4.3. No alarms noted during shift. Angiomax gtt infusing.  Resp: CMV 50%/20/450/5.  Neuro: Ongoing full body tremors to touch/cares. 0400 assessment pt followed commands and moved all extremities. Fentanyl and Versed providing adequate sedation. Video EEG ongoing.   : Anuric, ongoing CRRT, per Dr. Cisneros aggressively pull fluid, nephrology goal I = O.   GI: OG to LIS.  Skin:  Intact. Driveline small amount of yellow dried drainage, no redness or swelling at site.   Endocrine: 1/2 amp D50 given after 0600 blood sugar check..     Interventions: No significant events overnight. Spoke with Dr. Cisneros and Dr. Foss multiple times throughout shift in regards to CVP, pressor requirements, and fluid status goals.     Plan: Will continue to monitor/assess and update MD as needed.    Recommend:  Cardiology and nephrology consult each other for fluid status goals.

## 2021-02-17 NOTE — PROGRESS NOTES
CRRT STATUS NOTE    DATA:  Time:  0615 am  Pressures WNL:   Filter Status:  WDL    Problems Reported/Alarms Noted:  none    Supplies Present:  YES    ASSESSMENT:  Patient Net Fluid Balance:  Pt was net -405 ml at midnight 2/17/21 and is -1263 to far today  Vital Signs:BP (!) 125/93   Pulse 107   Temp 98.8  F (37.1  C)   Resp 20   Wt 98.8 kg (217 lb 13 oz)   SpO2 100%   BMI 34.11 kg/m      Labs:  K 4.6  Cr 3.42  INR 4.49  WBC 18.7  Hgb 8.8   Goals of Therapy:   I=O, pulling fluid per cardiology attending and fellow request.  Writer was told that bedside RN asked MDs to discuss orders with Renal doctors overnight.  Bedside RN was able to wean pt off of pressor support overnight.   INTERVENTIONS:   none    PLAN:  Continue to monitor and change set q 72 hrs and PRN, contact CRRT RN at [phone] with concerns.  Clarify goals of therapy

## 2021-02-17 NOTE — PROGRESS NOTES
LVAD Social Work Services Progress Note      Date of Initial Social Work Evaluation: 2/16/2021  Collaborated with: Pt's wife, Chapis, at bedside     Data: Pt with hx of LVAD implant 2/18/2020. Pt admitted 2/16/2021 from OSH for cardiogenic and septic shock and multiorgan failure. Pt currently intubated and sedated and on CRRT.   Wife at bedside and is encouraged by small progress pt has made. She continues to express desire for aggressive measures and believes this is what pt would want. She continues to be optimistic for a full recovery. Pt does not have a HCD and pt's wife is legal NOK and decision maker.      Intervention: Supportive Visit   Assessment: Pt's plans to remain at bedside until at least the end of the week. Wife staying with family in the [facility]. Wife is receptive to writer continuing to check-in and ask questions related to goals of care.   Education provided by SW: Ongoing Social Work support, goals of care conversation   Plan:    Discharge Plans in Progress: None     Barriers to d/c plan: Medical Stability     Follow up Plan: SW to continue to be available for emotional support and ongoing goals of care conversations. Wife is aware that writer is out the rest of the week, but colleague will be available or urgent needs.

## 2021-02-17 NOTE — PROGRESS NOTES
"CLINICAL NUTRITION SERVICES - ASSESSMENT NOTE     Nutrition Prescription    Malnutrition Status:    Does not meet criteria for malnutrition     Interventions ordered by Registered Dietitian (RD):  - Nutren 1.5 @ 15 ml/hr via OGT. Adv by 15 ml q8h to eventual goal, Nutren 1.5 @ 60 ml/hr   - Prosource (1 pkt QID) to meet protein needs.  - Nephronex daily to meet micronutrient needs with RRT   - FWF of 30 ml q4h for tube patency     Goal TF Provisions: Nutren 1.5 @ 60 mL/hr + Prosource (1 pkt QID) to provide 2320 kcals (31 kcal/kg/day), 142 g PRO (1.9 g/kg/day), 1094 mL H2O, 253 g CHO and no fiber daily. Nephronex.    Future Recommendations:  Pending K+ trends, may transition to renal formula: Novasource Renal @ 45 ml/hr (1080 ml) + Prosource (1 pkt QID) provides 2320 kcal (31 kcal/kg/day), 142 g pro (1.9 g/kg/day), 198 g CHO, 774 ml free water, and 0 g fiber daily.        REASON FOR ASSESSMENT  Eliseo Tanner is a 65 year old male assessed by the dietitian for Provider Order - RD to Assess and Order TF     NUTRITION HISTORY  Pt intubated, sedated at present. Per pt wife, she reports he was eating okay prior to admission. She shares he used to drink one boost plus shake per day between lunch and dinner, but he stopped that about a month ago. She does not feel his weight has drastically changed aside from fluid fluctuations with home diuretics.    CURRENT NUTRITION ORDERS  Diet: NPO (intubated)     LABS  Labs reviewed  K 4.5     MEDICATIONS  Medications reviewed  Senna-docusate BID     ANTHROPOMETRICS  Height:   Ht Readings from Last 2 Encounters:   02/08/21 1.702 m (5' 7\")   01/06/21 1.702 m (5' 7\")     Most Recent Weight: 98.8 kg (217 lb 13 oz)    IBW: 67.3 kg  BMI: Obesity Grade I BMI 30-34.9  Weight History: Today, patient weighs 217 lbs (99 kg), but suspect he is volume up. Based on records, suspect dry weight of 208 lbs (94.6 kg) on 1/8/21. Will use this weight for calculated dosing weight below.  Wt " Readings from Last 10 Encounters:   02/17/21 98.8 kg (217 lb 13 oz)   02/08/21 100.2 kg (221 lb)   01/08/21 94.6 kg (208 lb 8 oz)   12/17/20 96.2 kg (212 lb)   11/18/20 95.3 kg (210 lb 1.6 oz)   11/12/20 98.9 kg (218 lb)   09/21/20 98 kg (216 lb)   09/21/20 98 kg (216 lb)   05/19/20 93 kg (205 lb)   04/14/20 91.4 kg (201 lb 6.4 oz)       Dosing Weight: 74 kg (adjusted for obesity, based on IBW of 67.3 kg and recent driest weight of 94.6 kg on 1/8/21).    ASSESSED NUTRITION NEEDS  Estimated Energy Needs: 1850 - 2200 kcals/day (25 - 30 kcals/kg)  Justification: Maintenance  Estimated Protein Needs: 110 - 150+ grams protein/day (1.5 - 2+ grams of pro/kg)  Justification: Hypercatabolism with acute illness, increased needs w/ CRRT  Estimated Fluid Needs: (1 mL/kcal)   Justification: Maintenance    PHYSICAL FINDINGS  See malnutrition section below.  OGT in place    MALNUTRITION  % Intake: Decreased intake does not meet criteria  % Weight Loss: None noted  Subcutaneous Fat Loss: None observed  Muscle Loss: None observed  Fluid Accumulation/Edema: Does not meet criteria  Malnutrition Diagnosis: Patient does not meet two of the established criteria necessary for diagnosing malnutrition    NUTRITION DIAGNOSIS  Inadequate protein-energy intake related to NPO without nutrition support as evidenced by currently meeting 0% nutrition needs       INTERVENTIONS  Implementation  See interventions at top of progress note    Goals  Total avg nutritional intake to meet a minimum of 25 kcal/kg and 1.5 g PRO/kg daily (per dosing wt 74 kg).     Monitoring/Evaluation  Progress toward goals will be monitored and evaluated per protocol.    Marisela Bhandari RD, LD  [id number]  Pgr: [phone]

## 2021-02-17 NOTE — PROGRESS NOTES
CRRT STATUS NOTE    DATA:  Time:  4:51 PM  Pressures WNL:  YES  Filter Status:  WDL    Problems Reported/Alarms Noted:  None    Supplies Present:  YES    ASSESSMENT:  Patient Net Fluid Balance:  Since admit -73mL    Intake/Output Summary (Last 24 hours) at 2/17/2021 1651  Last data filed at 2/17/2021 1600  Gross per 24 hour   Intake 2039.33 ml   Output 3579 ml   Net -1539.67 ml      Vitals:  Temp:  [97.2  F (36.2  C)-99  F (37.2  C)] 99  F (37.2  C)  Pulse:  [] 108  Resp:  [13-20] 20  MAP:  [43 mmHg-164 mmHg] 75 mmHg  Arterial Line BP: ()/(31-82) 82/61  FiO2 (%):  [40 %-70 %] 40 %  SpO2:  [53 %-100 %] 98 %   Labs:    Lab Results   Component Value Date     02/17/2021    POTASSIUM 4.6 02/17/2021    CR 2.82 (H) 02/17/2021    BUN 48 (H) 02/17/2021    MAG 2.4 (H) 02/17/2021    PHOS 5.2 (H) 02/17/2021    WBC 15.7 (H) 02/17/2021    HGB 8.5 (L) 02/17/2021    HCT 27.9 (L) 02/17/2021     02/17/2021     Goals of Therapy:  Net neg /hr    INTERVENTIONS:   Review flow sheets and discuss plan of care with bedside nurse and nephrology team.    PLAN:  Continue fluid removal as tolerated per goals of therapy.  Check filter daily and change filter q 72 hrs and PRN.  Please contact the CRRT resource RN at [phone] with any questions/concerns.

## 2021-02-17 NOTE — PROGRESS NOTES
Nephrology Progress Note  02/17/2021         Assessment & Recommendations:   64 yo M with PMHx  NICM  s/p HM III , VT, severe MR, atrial fibrillation on warfarin, hypertension, CKD IV, DMII, HIT presented to OSH with fevers and cough in the setting of syncopal episode transferred to [facility] for further cares. Hospitalization complicated by Afib with RVR with hypotension and intubation with upgrade of cares to the ICU. Found to be in septic shock 2/2 legionella pneumonia with possible cardiogenic shock. Nephrology was consulted for evaluation and management of oliguric BERNARDA     #Oliguric BERNARDA 2/2 ischemic ATN  Baseline Cr last yr s/p LVED placement was around 1. On admission ~2.3 and doubled in the last 24 hours with rapid decrease in UOP. UA unimpressive with baseline proteinuria and new granular casts. Recent swan numbers notable for CVP 18 and wedge 12 mmHg. With deterioriating UOP and ongoing hemodynamics changes, initiated on preemptive renal replacement therapy on 2/16.  -CRRT with 4K baths. Target lowering CVP with I/O around ~ - 2L daily  - Avoid nephrotoxins as able  - Renally dose meds  - Renal will continue to follow     #H/o HIT  -Will use Bival with CRRT instead of heparin     #Hyponatermia, resolving  Likely 2/2 hypervolemia vs transient SIADH with the acute illness vs legionella pneumonia (known to cause hyponatremia)  -CTM     #Acid/base   Metabolic acidosis in the setting of elevated lactic acidosis, improved  -Anticipate to improvement with hemodynamics        Recommendations were communicated to primary team via note     Seen and discussed with Dr. Sintia Barry MD   [phone]      Interval History :   Nursing and provider notes from last 24 hours reviewed.    Patient coming off pressors. Continues to oliguric with I/O ~ 60 ml in the last 24 hrs. Remains intubated.      Physical Exam:   I/O last 3 completed shifts:  In: 1866.7 [I.V.:1611.7; NG/GT:240]  Out: 3479 [Urine:30; Emesis/NG  output:600; Other:2849]   BP (!) 125/93   Pulse 107   Temp 98.8  F (37.1  C) (Bladder)   Resp 20   Wt 98.8 kg (217 lb 13 oz)   SpO2 99%   BMI 34.11 kg/m       GENERAL APPEARANCE: intubated and sedate  EYES: eyes closed  Pulmonary: mechanical ventilation  CV: LVED hum   - hard to assess JVD   - No LE edema  GI: soft, nontender, normal bowel sounds  : lombardo in place  SKIN: no rash, warm, dry, no cyanosis  NEURO: intubated and sedated    Labs:   All labs reviewed by me  Electrolytes/Renal -   Recent Labs   Lab Test 02/17/21  1205 02/17/21  0350 02/16/21  2219 02/16/21  1633 02/16/21  0215 02/16/21  0215 02/15/21  1910    134 136 135   < > 126* 129*   POTASSIUM 4.5 4.6 4.5 4.3   < > 5.1 5.1   CHLORIDE 106 104 105 101   < > 94 97   CO2 25 23 23 22   < > 17* 16*   BUN 52* 62* 71* 82*   < > 84* 78*   CR 3.03* 3.42* 3.78* 4.33*   < > 4.08* 3.23*   GLC 94 77 88 100*   < > 212* 215*   CALOS 7.6* 8.6 10.7* 7.3*   < > 8.0* 8.7   MAG  --  2.5*  --  2.2  --  2.3 2.1   PHOS  --  6.3*  --  6.9*  --   --  5.0*    < > = values in this interval not displayed.       CBC -   Recent Labs   Lab Test 02/17/21  0832 02/17/21  0350 02/16/21 2219   WBC 15.7* 18.7* 19.4*   HGB 8.5* 8.8* 9.2*    258 264       LFTs -   Recent Labs   Lab Test 02/17/21  1205 02/17/21  0350 02/16/21 2219   ALKPHOS 119 128 133   BILITOTAL 1.3 1.4* 1.3   ALT 3,488* 3,843* 4,165*   AST 6,374* 7,648* 8,512*   PROTTOTAL 6.2* 6.7* 7.0   ALBUMIN 2.2* 2.4* 2.5*       Iron Panel -   Recent Labs   Lab Test 11/16/20  0616 02/08/20  0408   IRON 16* 25*   IRONSAT 6* 8*   HEAVENLY 75 189         Imaging:       Current Medications:    allopurinol  200 mg Oral or Feeding Tube Daily     aspirin  81 mg Oral Daily     azithromycin  500 mg Intravenous Q24H     B and C vitamin Complex with folic acid  5 mL Per Feeding Tube Daily     [START ON 2/18/2021] ceFEPIme (MAXIPIME) IV  2 g Intravenous Q12H     doxazosin  1 mg Oral Daily     finasteride  5 mg Oral Daily      FLUoxetine  30 mg Oral Daily     omeprazole  20 mg Oral QAM AC     protein modular  1 packet Per Feeding Tube 4x Daily     senna-docusate  2 tablet Oral BID     vancomycin (VANCOCIN) IV  2,000 mg (central catheter) Intravenous Q24H       [Held by provider] amiodarone       bivalirudin ANTICOAGULANT (ANGIOMAX) 250mg/250mL in 0.9% Sodium Chloride 0.02 mg/kg/hr (02/17/21 1600)     dextrose       dextrose Stopped (02/17/21 1600)     CRRT replacement solution 14 mL/kg/hr (02/17/21 1446)     DOBUTamine 2.5 mcg/kg/min (02/17/21 1600)     fentaNYL Stopped (02/17/21 1000)     insulin (regular) Stopped (02/16/21 1300)     midazolam Stopped (02/17/21 1000)     - MEDICATION INSTRUCTIONS -       norepinephrine Stopped (02/17/21 1000)     CRRT replacement solution 1.996 mL/kg/hr (02/17/21 1111)     CRRT replacement solution 12.5 mL/kg/hr (02/17/21 1532)     vasopressin Stopped (02/17/21 0045)     Jese Barry MD

## 2021-02-18 ENCOUNTER — APPOINTMENT (OUTPATIENT)
Dept: GENERAL RADIOLOGY | Facility: CLINIC | Age: 66
DRG: 870 | End: 2021-02-18
Attending: STUDENT IN AN ORGANIZED HEALTH CARE EDUCATION/TRAINING PROGRAM
Payer: MEDICARE

## 2021-02-18 ENCOUNTER — APPOINTMENT (OUTPATIENT)
Dept: NEUROLOGY | Facility: CLINIC | Age: 66
DRG: 870 | End: 2021-02-18
Attending: INTERNAL MEDICINE
Payer: MEDICARE

## 2021-02-18 ENCOUNTER — APPOINTMENT (OUTPATIENT)
Dept: ULTRASOUND IMAGING | Facility: CLINIC | Age: 66
DRG: 870 | End: 2021-02-18
Attending: INTERNAL MEDICINE
Payer: MEDICARE

## 2021-02-18 LAB
ALBUMIN SERPL-MCNC: 2.1 G/DL (ref 3.4–5)
ALBUMIN SERPL-MCNC: 2.2 G/DL (ref 3.4–5)
ALBUMIN SERPL-MCNC: 2.4 G/DL (ref 3.4–5)
ALP SERPL-CCNC: 139 U/L (ref 40–150)
ALP SERPL-CCNC: 151 U/L (ref 40–150)
ALP SERPL-CCNC: 159 U/L (ref 40–150)
ALT SERPL W P-5'-P-CCNC: 2794 U/L (ref 0–70)
ALT SERPL W P-5'-P-CCNC: 3233 U/L (ref 0–70)
ALT SERPL W P-5'-P-CCNC: 3378 U/L (ref 0–70)
ANION GAP SERPL CALCULATED.3IONS-SCNC: 5 MMOL/L (ref 3–14)
ANION GAP SERPL CALCULATED.3IONS-SCNC: 6 MMOL/L (ref 3–14)
ANION GAP SERPL CALCULATED.3IONS-SCNC: 8 MMOL/L (ref 3–14)
ANISOCYTOSIS BLD QL SMEAR: ABNORMAL
APTT PPP: 57 SEC (ref 22–37)
AST SERPL W P-5'-P-CCNC: 3362 U/L (ref 0–45)
AST SERPL W P-5'-P-CCNC: 4170 U/L (ref 0–45)
AST SERPL W P-5'-P-CCNC: 4870 U/L (ref 0–45)
BACTERIA SPEC CULT: ABNORMAL
BASE DEFICIT BLDA-SCNC: 1 MMOL/L
BASE DEFICIT BLDA-SCNC: 2 MMOL/L
BASE DEFICIT BLDA-SCNC: 2.5 MMOL/L
BASE DEFICIT BLDA-SCNC: 2.5 MMOL/L
BASE DEFICIT BLDV-SCNC: 0.5 MMOL/L
BASE DEFICIT BLDV-SCNC: 1.3 MMOL/L
BASE DEFICIT BLDV-SCNC: 1.9 MMOL/L
BASE DEFICIT BLDV-SCNC: 2.2 MMOL/L
BASE DEFICIT BLDV-SCNC: 2.5 MMOL/L
BASE DEFICIT BLDV-SCNC: 6.4 MMOL/L
BASE EXCESS BLDV CALC-SCNC: 0 MMOL/L
BASOPHILS # BLD AUTO: 0 10E9/L (ref 0–0.2)
BASOPHILS NFR BLD AUTO: 0 %
BILIRUB SERPL-MCNC: 1.4 MG/DL (ref 0.2–1.3)
BILIRUB SERPL-MCNC: 1.5 MG/DL (ref 0.2–1.3)
BILIRUB SERPL-MCNC: 1.6 MG/DL (ref 0.2–1.3)
BUN SERPL-MCNC: 37 MG/DL (ref 7–30)
BUN SERPL-MCNC: 37 MG/DL (ref 7–30)
BUN SERPL-MCNC: 43 MG/DL (ref 7–30)
BURR CELLS BLD QL SMEAR: ABNORMAL
CA-I BLD-MCNC: 4.3 MG/DL (ref 4.4–5.2)
CA-I BLD-MCNC: 4.4 MG/DL (ref 4.4–5.2)
CA-I BLD-MCNC: 4.4 MG/DL (ref 4.4–5.2)
CA-I SERPL ISE-MCNC: 4.1 MG/DL (ref 4.4–5.2)
CALCIUM SERPL-MCNC: 8 MG/DL (ref 8.5–10.1)
CALCIUM SERPL-MCNC: 8.3 MG/DL (ref 8.5–10.1)
CALCIUM SERPL-MCNC: 8.5 MG/DL (ref 8.5–10.1)
CHLORIDE SERPL-SCNC: 106 MMOL/L (ref 94–109)
CHLORIDE SERPL-SCNC: 106 MMOL/L (ref 94–109)
CHLORIDE SERPL-SCNC: 107 MMOL/L (ref 94–109)
CO2 SERPL-SCNC: 23 MMOL/L (ref 20–32)
CO2 SERPL-SCNC: 23 MMOL/L (ref 20–32)
CO2 SERPL-SCNC: 24 MMOL/L (ref 20–32)
CREAT SERPL-MCNC: 2.16 MG/DL (ref 0.66–1.25)
CREAT SERPL-MCNC: 2.34 MG/DL (ref 0.66–1.25)
CREAT SERPL-MCNC: 2.49 MG/DL (ref 0.66–1.25)
DIFFERENTIAL METHOD BLD: ABNORMAL
EOSINOPHIL # BLD AUTO: 0.1 10E9/L (ref 0–0.7)
EOSINOPHIL NFR BLD AUTO: 0.9 %
ERYTHROCYTE [DISTWIDTH] IN BLOOD BY AUTOMATED COUNT: 20.5 % (ref 10–15)
FACT X ACT/NOR PPP CHRO: 38 % (ref 70–130)
GFR SERPL CREATININE-BSD FRML MDRD: 26 ML/MIN/{1.73_M2}
GFR SERPL CREATININE-BSD FRML MDRD: 28 ML/MIN/{1.73_M2}
GFR SERPL CREATININE-BSD FRML MDRD: 30 ML/MIN/{1.73_M2}
GLUCOSE BLDC GLUCOMTR-MCNC: 102 MG/DL (ref 70–99)
GLUCOSE BLDC GLUCOMTR-MCNC: 106 MG/DL (ref 70–99)
GLUCOSE BLDC GLUCOMTR-MCNC: 109 MG/DL (ref 70–99)
GLUCOSE BLDC GLUCOMTR-MCNC: 111 MG/DL (ref 70–99)
GLUCOSE BLDC GLUCOMTR-MCNC: 112 MG/DL (ref 70–99)
GLUCOSE BLDC GLUCOMTR-MCNC: 144 MG/DL (ref 70–99)
GLUCOSE BLDC GLUCOMTR-MCNC: 145 MG/DL (ref 70–99)
GLUCOSE BLDC GLUCOMTR-MCNC: 89 MG/DL (ref 70–99)
GLUCOSE BLDC GLUCOMTR-MCNC: 97 MG/DL (ref 70–99)
GLUCOSE BLDC GLUCOMTR-MCNC: 97 MG/DL (ref 70–99)
GLUCOSE SERPL-MCNC: 112 MG/DL (ref 70–99)
GLUCOSE SERPL-MCNC: 113 MG/DL (ref 70–99)
GLUCOSE SERPL-MCNC: 144 MG/DL (ref 70–99)
HCO3 BLD-SCNC: 22 MMOL/L (ref 21–28)
HCO3 BLD-SCNC: 23 MMOL/L (ref 21–28)
HCO3 BLDV-SCNC: 19 MMOL/L (ref 21–28)
HCO3 BLDV-SCNC: 23 MMOL/L (ref 21–28)
HCO3 BLDV-SCNC: 24 MMOL/L (ref 21–28)
HCO3 BLDV-SCNC: 24 MMOL/L (ref 21–28)
HCO3 BLDV-SCNC: 25 MMOL/L (ref 21–28)
HCT VFR BLD AUTO: 29.2 % (ref 40–53)
HGB BLD-MCNC: 9 G/DL (ref 13.3–17.7)
INR PPP: 2.46 (ref 0.86–1.14)
LACTATE BLD-SCNC: 0.8 MMOL/L (ref 0.7–2)
LACTATE BLD-SCNC: 1 MMOL/L (ref 0.7–2)
LACTATE BLD-SCNC: 1.1 MMOL/L (ref 0.7–2)
LACTATE BLD-SCNC: 1.3 MMOL/L (ref 0.7–2)
LDH SERPL L TO P-CCNC: 1224 U/L (ref 85–227)
LYMPHOCYTES # BLD AUTO: 0.5 10E9/L (ref 0.8–5.3)
LYMPHOCYTES NFR BLD AUTO: 3.5 %
MACROCYTES BLD QL SMEAR: PRESENT
MAGNESIUM SERPL-MCNC: 2.6 MG/DL (ref 1.6–2.3)
MAGNESIUM SERPL-MCNC: 2.6 MG/DL (ref 1.6–2.3)
MCH RBC QN AUTO: 22.6 PG (ref 26.5–33)
MCHC RBC AUTO-ENTMCNC: 30.8 G/DL (ref 31.5–36.5)
MCV RBC AUTO: 73 FL (ref 78–100)
MDC_IDC_EPISODE_DTM: NORMAL
MDC_IDC_EPISODE_DURATION: 1080 S
MDC_IDC_EPISODE_DURATION: 480 S
MDC_IDC_EPISODE_DURATION: 720 S
MDC_IDC_EPISODE_ID: 223
MDC_IDC_EPISODE_ID: 224
MDC_IDC_EPISODE_ID: 225
MDC_IDC_EPISODE_TYPE: NORMAL
MDC_IDC_LEAD_IMPLANT_DT: NORMAL
MDC_IDC_LEAD_LOCATION: NORMAL
MDC_IDC_LEAD_LOCATION_DETAIL_1: NORMAL
MDC_IDC_LEAD_MFG: NORMAL
MDC_IDC_LEAD_MODEL: NORMAL
MDC_IDC_LEAD_POLARITY_TYPE: NORMAL
MDC_IDC_LEAD_SERIAL: NORMAL
MDC_IDC_LEAD_SPECIAL_FUNCTION: NORMAL
MDC_IDC_MSMT_BATTERY_DTM: NORMAL
MDC_IDC_MSMT_BATTERY_REMAINING_LONGEVITY: 133 MO
MDC_IDC_MSMT_BATTERY_RRT_TRIGGER: 2.73
MDC_IDC_MSMT_BATTERY_STATUS: NORMAL
MDC_IDC_MSMT_BATTERY_VOLTAGE: 3.01 V
MDC_IDC_MSMT_CAP_CHARGE_DTM: NORMAL
MDC_IDC_MSMT_CAP_CHARGE_ENERGY: 18 J
MDC_IDC_MSMT_CAP_CHARGE_TIME: 3.82
MDC_IDC_MSMT_CAP_CHARGE_TYPE: NORMAL
MDC_IDC_MSMT_LEADCHNL_RV_IMPEDANCE_VALUE: 285 OHM
MDC_IDC_MSMT_LEADCHNL_RV_IMPEDANCE_VALUE: 361 OHM
MDC_IDC_MSMT_LEADCHNL_RV_PACING_THRESHOLD_AMPLITUDE: 0.75 V
MDC_IDC_MSMT_LEADCHNL_RV_PACING_THRESHOLD_PULSEWIDTH: 0.4 MS
MDC_IDC_MSMT_LEADCHNL_RV_SENSING_INTR_AMPL: 8.75 MV
MDC_IDC_MSMT_LEADCHNL_RV_SENSING_INTR_AMPL: 9.5 MV
MDC_IDC_PG_IMPLANT_DTM: NORMAL
MDC_IDC_PG_MFG: NORMAL
MDC_IDC_PG_MODEL: NORMAL
MDC_IDC_PG_SERIAL: NORMAL
MDC_IDC_PG_TYPE: NORMAL
MDC_IDC_SESS_CLINIC_NAME: NORMAL
MDC_IDC_SESS_DTM: NORMAL
MDC_IDC_SESS_TYPE: NORMAL
MDC_IDC_SET_BRADY_HYSTRATE: NORMAL
MDC_IDC_SET_BRADY_LOWRATE: 40 {BEATS}/MIN
MDC_IDC_SET_BRADY_MODE: NORMAL
MDC_IDC_SET_LEADCHNL_RV_PACING_AMPLITUDE: 2 V
MDC_IDC_SET_LEADCHNL_RV_PACING_ANODE_ELECTRODE_1: NORMAL
MDC_IDC_SET_LEADCHNL_RV_PACING_ANODE_LOCATION_1: NORMAL
MDC_IDC_SET_LEADCHNL_RV_PACING_CAPTURE_MODE: NORMAL
MDC_IDC_SET_LEADCHNL_RV_PACING_CATHODE_ELECTRODE_1: NORMAL
MDC_IDC_SET_LEADCHNL_RV_PACING_CATHODE_LOCATION_1: NORMAL
MDC_IDC_SET_LEADCHNL_RV_PACING_POLARITY: NORMAL
MDC_IDC_SET_LEADCHNL_RV_PACING_PULSEWIDTH: 0.4 MS
MDC_IDC_SET_LEADCHNL_RV_SENSING_ANODE_ELECTRODE_1: NORMAL
MDC_IDC_SET_LEADCHNL_RV_SENSING_ANODE_LOCATION_1: NORMAL
MDC_IDC_SET_LEADCHNL_RV_SENSING_CATHODE_ELECTRODE_1: NORMAL
MDC_IDC_SET_LEADCHNL_RV_SENSING_CATHODE_LOCATION_1: NORMAL
MDC_IDC_SET_LEADCHNL_RV_SENSING_POLARITY: NORMAL
MDC_IDC_SET_LEADCHNL_RV_SENSING_SENSITIVITY: 0.3 MV
MDC_IDC_SET_ZONE_DETECTION_BEATS_DENOMINATOR: 40 {BEATS}
MDC_IDC_SET_ZONE_DETECTION_BEATS_NUMERATOR: 30 {BEATS}
MDC_IDC_SET_ZONE_DETECTION_INTERVAL: 270 MS
MDC_IDC_SET_ZONE_DETECTION_INTERVAL: 460 MS
MDC_IDC_SET_ZONE_DETECTION_INTERVAL: 500 MS
MDC_IDC_SET_ZONE_DETECTION_INTERVAL: NORMAL
MDC_IDC_SET_ZONE_TYPE: NORMAL
MDC_IDC_STAT_AT_BURDEN_PERCENT: 2.6 %
MDC_IDC_STAT_AT_DTM_END: NORMAL
MDC_IDC_STAT_AT_DTM_START: NORMAL
MDC_IDC_STAT_BRADY_DTM_END: NORMAL
MDC_IDC_STAT_BRADY_DTM_START: NORMAL
MDC_IDC_STAT_BRADY_RV_PERCENT_PACED: 0.01 %
MDC_IDC_STAT_EPISODE_RECENT_COUNT: 0
MDC_IDC_STAT_EPISODE_RECENT_COUNT: 2
MDC_IDC_STAT_EPISODE_RECENT_COUNT_DTM_END: NORMAL
MDC_IDC_STAT_EPISODE_RECENT_COUNT_DTM_START: NORMAL
MDC_IDC_STAT_EPISODE_TOTAL_COUNT: 0
MDC_IDC_STAT_EPISODE_TOTAL_COUNT: 225
MDC_IDC_STAT_EPISODE_TOTAL_COUNT_DTM_END: NORMAL
MDC_IDC_STAT_EPISODE_TOTAL_COUNT_DTM_START: NORMAL
MDC_IDC_STAT_EPISODE_TYPE: NORMAL
MDC_IDC_STAT_TACHYTHERAPY_ATP_DELIVERED_RECENT: 0
MDC_IDC_STAT_TACHYTHERAPY_ATP_DELIVERED_TOTAL: 0
MDC_IDC_STAT_TACHYTHERAPY_RECENT_DTM_END: NORMAL
MDC_IDC_STAT_TACHYTHERAPY_RECENT_DTM_START: NORMAL
MDC_IDC_STAT_TACHYTHERAPY_SHOCKS_ABORTED_RECENT: 0
MDC_IDC_STAT_TACHYTHERAPY_SHOCKS_ABORTED_TOTAL: 0
MDC_IDC_STAT_TACHYTHERAPY_SHOCKS_DELIVERED_RECENT: 0
MDC_IDC_STAT_TACHYTHERAPY_SHOCKS_DELIVERED_TOTAL: 0
MDC_IDC_STAT_TACHYTHERAPY_TOTAL_DTM_END: NORMAL
MDC_IDC_STAT_TACHYTHERAPY_TOTAL_DTM_START: NORMAL
MONOCYTES # BLD AUTO: 0.6 10E9/L (ref 0–1.3)
MONOCYTES NFR BLD AUTO: 4.3 %
NEUTROPHILS # BLD AUTO: 12.5 10E9/L (ref 1.6–8.3)
NEUTROPHILS NFR BLD AUTO: 91.3 %
O2/TOTAL GAS SETTING VFR VENT: 40 %
OVALOCYTES BLD QL SMEAR: SLIGHT
OXYHGB MFR BLD: 96 % (ref 92–100)
OXYHGB MFR BLDV: 42 %
OXYHGB MFR BLDV: 45 %
OXYHGB MFR BLDV: 46 %
OXYHGB MFR BLDV: 48 %
OXYHGB MFR BLDV: 49 %
OXYHGB MFR BLDV: 51 %
OXYHGB MFR BLDV: 55 %
PCO2 BLD: 33 MM HG (ref 35–45)
PCO2 BLD: 34 MM HG (ref 35–45)
PCO2 BLD: 36 MM HG (ref 35–45)
PCO2 BLD: 38 MM HG (ref 35–45)
PCO2 BLDV: 36 MM HG (ref 40–50)
PCO2 BLDV: 37 MM HG (ref 40–50)
PCO2 BLDV: 38 MM HG (ref 40–50)
PCO2 BLDV: 38 MM HG (ref 40–50)
PCO2 BLDV: 39 MM HG (ref 40–50)
PCO2 BLDV: 40 MM HG (ref 40–50)
PCO2 BLDV: 41 MM HG (ref 40–50)
PH BLD: 7.38 PH (ref 7.35–7.45)
PH BLD: 7.4 PH (ref 7.35–7.45)
PH BLD: 7.41 PH (ref 7.35–7.45)
PH BLD: 7.45 PH (ref 7.35–7.45)
PH BLDV: 7.33 PH (ref 7.32–7.43)
PH BLDV: 7.37 PH (ref 7.32–7.43)
PH BLDV: 7.37 PH (ref 7.32–7.43)
PH BLDV: 7.38 PH (ref 7.32–7.43)
PH BLDV: 7.4 PH (ref 7.32–7.43)
PH BLDV: 7.41 PH (ref 7.32–7.43)
PH BLDV: 7.41 PH (ref 7.32–7.43)
PHOSPHATE SERPL-MCNC: 3.8 MG/DL (ref 2.5–4.5)
PHOSPHATE SERPL-MCNC: 4.4 MG/DL (ref 2.5–4.5)
PLATELET # BLD AUTO: 267 10E9/L (ref 150–450)
PO2 BLD: 100 MM HG (ref 80–105)
PO2 BLD: 106 MM HG (ref 80–105)
PO2 BLD: 83 MM HG (ref 80–105)
PO2 BLD: 98 MM HG (ref 80–105)
PO2 BLDV: 28 MM HG (ref 25–47)
PO2 BLDV: 29 MM HG (ref 25–47)
PO2 BLDV: 30 MM HG (ref 25–47)
PO2 BLDV: 31 MM HG (ref 25–47)
PO2 BLDV: 31 MM HG (ref 25–47)
PO2 BLDV: 34 MM HG (ref 25–47)
PO2 BLDV: 35 MM HG (ref 25–47)
POIKILOCYTOSIS BLD QL SMEAR: ABNORMAL
POTASSIUM SERPL-SCNC: 4 MMOL/L (ref 3.4–5.3)
POTASSIUM SERPL-SCNC: 4.4 MMOL/L (ref 3.4–5.3)
POTASSIUM SERPL-SCNC: 4.4 MMOL/L (ref 3.4–5.3)
PROT SERPL-MCNC: 6.3 G/DL (ref 6.8–8.8)
PROT SERPL-MCNC: 6.6 G/DL (ref 6.8–8.8)
PROT SERPL-MCNC: 6.8 G/DL (ref 6.8–8.8)
RBC # BLD AUTO: 3.99 10E12/L (ref 4.4–5.9)
SODIUM SERPL-SCNC: 135 MMOL/L (ref 133–144)
SODIUM SERPL-SCNC: 136 MMOL/L (ref 133–144)
SODIUM SERPL-SCNC: 137 MMOL/L (ref 133–144)
SPECIMEN SOURCE: ABNORMAL
WBC # BLD AUTO: 13.7 10E9/L (ref 4–11)

## 2021-02-18 PROCEDURE — 87541 LEGION PNEUMO DNA AMP PROB: CPT | Performed by: INTERNAL MEDICINE

## 2021-02-18 PROCEDURE — 93975 VASCULAR STUDY: CPT | Mod: 26 | Performed by: RADIOLOGY

## 2021-02-18 PROCEDURE — 87798 DETECT AGENT NOS DNA AMP: CPT | Performed by: STUDENT IN AN ORGANIZED HEALTH CARE EDUCATION/TRAINING PROGRAM

## 2021-02-18 PROCEDURE — 85610 PROTHROMBIN TIME: CPT | Performed by: STUDENT IN AN ORGANIZED HEALTH CARE EDUCATION/TRAINING PROGRAM

## 2021-02-18 PROCEDURE — 99233 SBSQ HOSP IP/OBS HIGH 50: CPT | Performed by: INTERNAL MEDICINE

## 2021-02-18 PROCEDURE — 95718 EEG PHYS/QHP 2-12 HR W/VEEG: CPT | Mod: GC | Performed by: PSYCHIATRY & NEUROLOGY

## 2021-02-18 PROCEDURE — 99233 SBSQ HOSP IP/OBS HIGH 50: CPT | Mod: GC | Performed by: INTERNAL MEDICINE

## 2021-02-18 PROCEDURE — 93975 VASCULAR STUDY: CPT

## 2021-02-18 PROCEDURE — 95711 VEEG 2-12 HR UNMONITORED: CPT

## 2021-02-18 PROCEDURE — 250N000009 HC RX 250: Performed by: INTERNAL MEDICINE

## 2021-02-18 PROCEDURE — 83615 LACTATE (LD) (LDH) ENZYME: CPT | Performed by: STUDENT IN AN ORGANIZED HEALTH CARE EDUCATION/TRAINING PROGRAM

## 2021-02-18 PROCEDURE — 200N000002 HC R&B ICU UMMC

## 2021-02-18 PROCEDURE — 83735 ASSAY OF MAGNESIUM: CPT | Performed by: STUDENT IN AN ORGANIZED HEALTH CARE EDUCATION/TRAINING PROGRAM

## 2021-02-18 PROCEDURE — 258N000003 HC RX IP 258 OP 636: Performed by: STUDENT IN AN ORGANIZED HEALTH CARE EDUCATION/TRAINING PROGRAM

## 2021-02-18 PROCEDURE — 90947 DIALYSIS REPEATED EVAL: CPT

## 2021-02-18 PROCEDURE — 99291 CRITICAL CARE FIRST HOUR: CPT | Mod: 25 | Performed by: INTERNAL MEDICINE

## 2021-02-18 PROCEDURE — 80053 COMPREHEN METABOLIC PANEL: CPT | Performed by: INTERNAL MEDICINE

## 2021-02-18 PROCEDURE — 250N000011 HC RX IP 250 OP 636: Performed by: STUDENT IN AN ORGANIZED HEALTH CARE EDUCATION/TRAINING PROGRAM

## 2021-02-18 PROCEDURE — 250N000011 HC RX IP 250 OP 636

## 2021-02-18 PROCEDURE — 87529 HSV DNA AMP PROBE: CPT | Performed by: STUDENT IN AN ORGANIZED HEALTH CARE EDUCATION/TRAINING PROGRAM

## 2021-02-18 PROCEDURE — 71045 X-RAY EXAM CHEST 1 VIEW: CPT

## 2021-02-18 PROCEDURE — 82805 BLOOD GASES W/O2 SATURATION: CPT | Performed by: STUDENT IN AN ORGANIZED HEALTH CARE EDUCATION/TRAINING PROGRAM

## 2021-02-18 PROCEDURE — 250N000011 HC RX IP 250 OP 636: Performed by: INTERNAL MEDICINE

## 2021-02-18 PROCEDURE — 999N001017 HC STATISTIC GLUCOSE BY METER IP

## 2021-02-18 PROCEDURE — 258N000003 HC RX IP 258 OP 636: Performed by: INTERNAL MEDICINE

## 2021-02-18 PROCEDURE — 999N000045 HC STATISTIC DAILY SWAN MONITORING

## 2021-02-18 PROCEDURE — 999N000157 HC STATISTIC RCP TIME EA 10 MIN

## 2021-02-18 PROCEDURE — 250N000013 HC RX MED GY IP 250 OP 250 PS 637: Performed by: INTERNAL MEDICINE

## 2021-02-18 PROCEDURE — 83605 ASSAY OF LACTIC ACID: CPT | Performed by: STUDENT IN AN ORGANIZED HEALTH CARE EDUCATION/TRAINING PROGRAM

## 2021-02-18 PROCEDURE — 82803 BLOOD GASES ANY COMBINATION: CPT | Performed by: STUDENT IN AN ORGANIZED HEALTH CARE EDUCATION/TRAINING PROGRAM

## 2021-02-18 PROCEDURE — 85730 THROMBOPLASTIN TIME PARTIAL: CPT | Performed by: STUDENT IN AN ORGANIZED HEALTH CARE EDUCATION/TRAINING PROGRAM

## 2021-02-18 PROCEDURE — 250N000013 HC RX MED GY IP 250 OP 250 PS 637: Performed by: STUDENT IN AN ORGANIZED HEALTH CARE EDUCATION/TRAINING PROGRAM

## 2021-02-18 PROCEDURE — 84100 ASSAY OF PHOSPHORUS: CPT | Performed by: STUDENT IN AN ORGANIZED HEALTH CARE EDUCATION/TRAINING PROGRAM

## 2021-02-18 PROCEDURE — 82805 BLOOD GASES W/O2 SATURATION: CPT | Performed by: INTERNAL MEDICINE

## 2021-02-18 PROCEDURE — 83735 ASSAY OF MAGNESIUM: CPT | Performed by: INTERNAL MEDICINE

## 2021-02-18 PROCEDURE — 87798 DETECT AGENT NOS DNA AMP: CPT | Performed by: INTERNAL MEDICINE

## 2021-02-18 PROCEDURE — 85260 CLOT FACTOR X STUART-POWER: CPT | Performed by: STUDENT IN AN ORGANIZED HEALTH CARE EDUCATION/TRAINING PROGRAM

## 2021-02-18 PROCEDURE — 80053 COMPREHEN METABOLIC PANEL: CPT | Performed by: STUDENT IN AN ORGANIZED HEALTH CARE EDUCATION/TRAINING PROGRAM

## 2021-02-18 PROCEDURE — 71045 X-RAY EXAM CHEST 1 VIEW: CPT | Mod: 26 | Performed by: RADIOLOGY

## 2021-02-18 PROCEDURE — 85025 COMPLETE CBC W/AUTO DIFF WBC: CPT | Performed by: STUDENT IN AN ORGANIZED HEALTH CARE EDUCATION/TRAINING PROGRAM

## 2021-02-18 PROCEDURE — 93750 INTERROGATION VAD IN PERSON: CPT | Performed by: INTERNAL MEDICINE

## 2021-02-18 PROCEDURE — 84100 ASSAY OF PHOSPHORUS: CPT | Performed by: INTERNAL MEDICINE

## 2021-02-18 PROCEDURE — 94003 VENT MGMT INPAT SUBQ DAY: CPT

## 2021-02-18 PROCEDURE — 999N000015 HC STATISTIC ARTERIAL MONITORING DAILY

## 2021-02-18 PROCEDURE — 82330 ASSAY OF CALCIUM: CPT | Performed by: STUDENT IN AN ORGANIZED HEALTH CARE EDUCATION/TRAINING PROGRAM

## 2021-02-18 RX ORDER — HYDRALAZINE HYDROCHLORIDE 20 MG/ML
10 INJECTION INTRAMUSCULAR; INTRAVENOUS ONCE
Status: COMPLETED | OUTPATIENT
Start: 2021-02-18 | End: 2021-02-18

## 2021-02-18 RX ORDER — FLUOXETINE 20 MG/5ML
30 SOLUTION ORAL DAILY
Status: DISCONTINUED | OUTPATIENT
Start: 2021-02-18 | End: 2021-02-21

## 2021-02-18 RX ORDER — CEFAZOLIN SODIUM 2 G/100ML
2 INJECTION, SOLUTION INTRAVENOUS EVERY 12 HOURS
Status: DISCONTINUED | OUTPATIENT
Start: 2021-02-18 | End: 2021-02-22

## 2021-02-18 RX ORDER — HYDRALAZINE HYDROCHLORIDE 20 MG/ML
10 INJECTION INTRAMUSCULAR; INTRAVENOUS EVERY 6 HOURS PRN
Status: DISCONTINUED | OUTPATIENT
Start: 2021-02-18 | End: 2021-03-17 | Stop reason: HOSPADM

## 2021-02-18 RX ADMIN — ALLOPURINOL 200 MG: 100 TABLET ORAL at 07:34

## 2021-02-18 RX ADMIN — CALCIUM CHLORIDE, MAGNESIUM CHLORIDE, SODIUM CHLORIDE, SODIUM BICARBONATE, POTASSIUM CHLORIDE AND SODIUM PHOSPHATE DIBASIC DIHYDRATE 12.5 ML/KG/HR: 3.68; 3.05; 6.34; 3.09; .314; .187 INJECTION INTRAVENOUS at 21:58

## 2021-02-18 RX ADMIN — Medication 1 MG: at 07:34

## 2021-02-18 RX ADMIN — CEFEPIME HYDROCHLORIDE 2 G: 2 INJECTION, POWDER, FOR SOLUTION INTRAVENOUS at 12:33

## 2021-02-18 RX ADMIN — CALCIUM CHLORIDE, MAGNESIUM CHLORIDE, SODIUM CHLORIDE, SODIUM BICARBONATE, POTASSIUM CHLORIDE AND SODIUM PHOSPHATE DIBASIC DIHYDRATE 12.5 ML/KG/HR: 3.68; 3.05; 6.34; 3.09; .314; .187 INJECTION INTRAVENOUS at 17:48

## 2021-02-18 RX ADMIN — OMEPRAZOLE 20 MG: KIT at 07:35

## 2021-02-18 RX ADMIN — FINASTERIDE 5 MG: 5 TABLET, FILM COATED ORAL at 07:41

## 2021-02-18 RX ADMIN — CALCIUM CHLORIDE, MAGNESIUM CHLORIDE, SODIUM CHLORIDE, SODIUM BICARBONATE, POTASSIUM CHLORIDE AND SODIUM PHOSPHATE DIBASIC DIHYDRATE 12.5 ML/KG/HR: 3.68; 3.05; 6.34; 3.09; .314; .187 INJECTION INTRAVENOUS at 15:12

## 2021-02-18 RX ADMIN — PHYTONADIONE 1 MG: 10 INJECTION, EMULSION INTRAMUSCULAR; INTRAVENOUS; SUBCUTANEOUS at 13:58

## 2021-02-18 RX ADMIN — CALCIUM CHLORIDE, MAGNESIUM CHLORIDE, SODIUM CHLORIDE, SODIUM BICARBONATE, POTASSIUM CHLORIDE AND SODIUM PHOSPHATE DIBASIC DIHYDRATE 2 ML/KG/HR: 3.68; 3.05; 6.34; 3.09; .314; .187 INJECTION INTRAVENOUS at 03:45

## 2021-02-18 RX ADMIN — CALCIUM CHLORIDE, MAGNESIUM CHLORIDE, SODIUM CHLORIDE, SODIUM BICARBONATE, POTASSIUM CHLORIDE AND SODIUM PHOSPHATE DIBASIC DIHYDRATE 14 ML/KG/HR: 3.68; 3.05; 6.34; 3.09; .314; .187 INJECTION INTRAVENOUS at 14:39

## 2021-02-18 RX ADMIN — Medication 1 PACKET: at 11:20

## 2021-02-18 RX ADMIN — CALCIUM CHLORIDE 1 G: 100 INJECTION, SOLUTION INTRAVENOUS at 05:19

## 2021-02-18 RX ADMIN — CALCIUM CHLORIDE, MAGNESIUM CHLORIDE, SODIUM CHLORIDE, SODIUM BICARBONATE, POTASSIUM CHLORIDE AND SODIUM PHOSPHATE DIBASIC DIHYDRATE 12.5 ML/KG/HR: 3.68; 3.05; 6.34; 3.09; .314; .187 INJECTION INTRAVENOUS at 06:58

## 2021-02-18 RX ADMIN — AZITHROMYCIN MONOHYDRATE 500 MG: 500 INJECTION, POWDER, LYOPHILIZED, FOR SOLUTION INTRAVENOUS at 08:20

## 2021-02-18 RX ADMIN — CALCIUM CHLORIDE, MAGNESIUM CHLORIDE, SODIUM CHLORIDE, SODIUM BICARBONATE, POTASSIUM CHLORIDE AND SODIUM PHOSPHATE DIBASIC DIHYDRATE 14 ML/KG/HR: 3.68; 3.05; 6.34; 3.09; .314; .187 INJECTION INTRAVENOUS at 03:45

## 2021-02-18 RX ADMIN — FLUOXETINE HYDROCHLORIDE 30 MG: 20 SOLUTION ORAL at 09:10

## 2021-02-18 RX ADMIN — DOBUTAMINE HYDROCHLORIDE 3 MCG/KG/MIN: 200 INJECTION INTRAVENOUS at 13:34

## 2021-02-18 RX ADMIN — CALCIUM CHLORIDE, MAGNESIUM CHLORIDE, SODIUM CHLORIDE, SODIUM BICARBONATE, POTASSIUM CHLORIDE AND SODIUM PHOSPHATE DIBASIC DIHYDRATE 14 ML/KG/HR: 3.68; 3.05; 6.34; 3.09; .314; .187 INJECTION INTRAVENOUS at 06:59

## 2021-02-18 RX ADMIN — CALCIUM CHLORIDE, MAGNESIUM CHLORIDE, SODIUM CHLORIDE, SODIUM BICARBONATE, POTASSIUM CHLORIDE AND SODIUM PHOSPHATE DIBASIC DIHYDRATE 14 ML/KG/HR: 3.68; 3.05; 6.34; 3.09; .314; .187 INJECTION INTRAVENOUS at 21:57

## 2021-02-18 RX ADMIN — Medication 1 PACKET: at 07:35

## 2021-02-18 RX ADMIN — CALCIUM CHLORIDE, MAGNESIUM CHLORIDE, SODIUM CHLORIDE, SODIUM BICARBONATE, POTASSIUM CHLORIDE AND SODIUM PHOSPHATE DIBASIC DIHYDRATE 14 ML/KG/HR: 3.68; 3.05; 6.34; 3.09; .314; .187 INJECTION INTRAVENOUS at 01:27

## 2021-02-18 RX ADMIN — HYDRALAZINE HYDROCHLORIDE 10 MG: 20 INJECTION INTRAMUSCULAR; INTRAVENOUS at 22:26

## 2021-02-18 RX ADMIN — DOCUSATE SODIUM 50 MG AND SENNOSIDES 8.6 MG 2 TABLET: 8.6; 5 TABLET, FILM COATED ORAL at 19:42

## 2021-02-18 RX ADMIN — CEFEPIME HYDROCHLORIDE 2 G: 2 INJECTION, POWDER, FOR SOLUTION INTRAVENOUS at 01:16

## 2021-02-18 RX ADMIN — Medication 1 PACKET: at 19:43

## 2021-02-18 RX ADMIN — ASPIRIN 81 MG CHEWABLE TABLET 81 MG: 81 TABLET CHEWABLE at 07:34

## 2021-02-18 RX ADMIN — CALCIUM CHLORIDE, MAGNESIUM CHLORIDE, SODIUM CHLORIDE, SODIUM BICARBONATE, POTASSIUM CHLORIDE AND SODIUM PHOSPHATE DIBASIC DIHYDRATE 12.5 ML/KG/HR: 3.68; 3.05; 6.34; 3.09; .314; .187 INJECTION INTRAVENOUS at 11:20

## 2021-02-18 RX ADMIN — Medication 5 ML: at 07:34

## 2021-02-18 RX ADMIN — BIVALIRUDIN 0.02 MG/KG/HR: 250 INJECTION INTRACAVERNOUS at 16:30

## 2021-02-18 RX ADMIN — CALCIUM CHLORIDE, MAGNESIUM CHLORIDE, SODIUM CHLORIDE, SODIUM BICARBONATE, POTASSIUM CHLORIDE AND SODIUM PHOSPHATE DIBASIC DIHYDRATE 14 ML/KG/HR: 3.68; 3.05; 6.34; 3.09; .314; .187 INJECTION INTRAVENOUS at 11:12

## 2021-02-18 RX ADMIN — DOCUSATE SODIUM 50 MG AND SENNOSIDES 8.6 MG 2 TABLET: 8.6; 5 TABLET, FILM COATED ORAL at 07:34

## 2021-02-18 RX ADMIN — CALCIUM CHLORIDE, MAGNESIUM CHLORIDE, SODIUM CHLORIDE, SODIUM BICARBONATE, POTASSIUM CHLORIDE AND SODIUM PHOSPHATE DIBASIC DIHYDRATE 14 ML/KG/HR: 3.68; 3.05; 6.34; 3.09; .314; .187 INJECTION INTRAVENOUS at 17:48

## 2021-02-18 RX ADMIN — CALCIUM CHLORIDE, MAGNESIUM CHLORIDE, SODIUM CHLORIDE, SODIUM BICARBONATE, POTASSIUM CHLORIDE AND SODIUM PHOSPHATE DIBASIC DIHYDRATE 12.5 ML/KG/HR: 3.68; 3.05; 6.34; 3.09; .314; .187 INJECTION INTRAVENOUS at 02:55

## 2021-02-18 RX ADMIN — Medication 1 PACKET: at 15:23

## 2021-02-18 RX ADMIN — CALCIUM CHLORIDE 1 G: 100 INJECTION, SOLUTION INTRAVENOUS at 17:39

## 2021-02-18 RX ADMIN — CEFAZOLIN SODIUM 2 G: 2 INJECTION, SOLUTION INTRAVENOUS at 19:43

## 2021-02-18 RX ADMIN — HYDRALAZINE HYDROCHLORIDE 10 MG: 20 INJECTION INTRAMUSCULAR; INTRAVENOUS at 01:34

## 2021-02-18 RX ADMIN — CALCIUM CHLORIDE, MAGNESIUM CHLORIDE, SODIUM CHLORIDE, SODIUM BICARBONATE, POTASSIUM CHLORIDE AND SODIUM PHOSPHATE DIBASIC DIHYDRATE 12.5 ML/KG/HR: 3.68; 3.05; 6.34; 3.09; .314; .187 INJECTION INTRAVENOUS at 03:45

## 2021-02-18 ASSESSMENT — ACTIVITIES OF DAILY LIVING (ADL)
ADLS_ACUITY_SCORE: 16

## 2021-02-18 NOTE — PLAN OF CARE
Cardiac: ST RBBB, CVP mostly 15 throughout shift.  Dobutamine gtt infusing, see MAR/doc flow sheet. LVAD Flow 4.3-4.9, Speed 5500, PI 1.9-4.4, Power 4.2-4.4. No alarms noted during shift. Angiomax gtt infusing.  Resp: CMV 50%/20/450/5. Failed P/S this morning d/t low tidal volumes.  Neuro: Minimum tremors to touch/cares, responding more then previous shift. Ongoing no sedation, continued Video EEG.   : Anuric, ongoing CRRT, able to achieve goal of net negative  ml/hr.   GI: Tube feeding stopped at midnight for U/S ordered for this morning.  Skin:  Intact. Driveline small amount of yellow dried drainage, no redness or swelling at site.   Endocrine: No needs for D50/D10 gtt      Interventions: 1 dose of IV hydralazine given for sustained MAP > 100 and CVP 12, per Dr. Alfaro CVP goal 10-14.      Plan: Will continue to monitor/assess and update MD as needed. Abdominal U/S order for this morning.

## 2021-02-18 NOTE — PROGRESS NOTES
Nephrology Progress Note  02/18/2021         Assessment & Recommendations:   64 yo M with PMHx  NICM  s/p HM III , VT, severe MR, atrial fibrillation on warfarin, hypertension, CKD IV, DMII, HIT presented to OSH with fevers and cough in the setting of syncopal episode transferred to [facility] for further cares. Hospitalization complicated by Afib with RVR with hypotension and intubation with upgrade of cares to the ICU. Found to be in septic shock 2/2 legionella pneumonia with possible cardiogenic shock. Nephrology was consulted for evaluation and management of anuric BERNARDA     Recommendations:  - Decrease CRRT to net neg 0 ml/hr for goal I=Os     # Anuric BERNARDA 2/2 ischemic ATN  Baseline Cr last yr s/p LVED placement was around 1. On admission ~2.3 and doubled within 24 hours with rapid decrease in UOP. UA unimpressive with baseline proteinuria and new granular casts. Initiated on RRT 2/16. CVP has remained around 14 but now down to 8 today with intermittent MAPs in low 60s and PIs occasionally below 2 with ongoing dobutamine and intermittent NE requirement. Plan to decrease CRRT UF goals today in light of this. Now appears to be anuric with only 25cc UOP overnight and none since MN.   - Continue CRRT with net neg 0cc/hr, goal of I=Os/24hrs   - Target CVP of 10-14  - Avoid nephrotoxins as able  - Renally dose meds  - Renal will continue to follow     #H/o HIT  -Using Bival with CRRT instead of heparin     #Hyponatermia, resolved  Likely 2/2 hypervolemia vs transient SIADH with the acute illness vs legionella pneumonia.  -CTM     #Acid/base, resolved   Metabolic acidosis initially in the setting of elevated lactic acidosis, improved         Recommendations were communicated to primary team via note     Seen and discussed with Dr. Colby Harrison MD   [phone]    Nephrology Staff  I have seen and evaluated the patient, reviewed the clinical data, and discussed the case with the renal resident. See resident's  note for details. I agree with the evaluation and treatment plans.  BERNARDA on CRRT - will change to I=O today as volume goals met.    Reilly Bowman MD      Interval History :   Nursing and provider notes from last 24 hours reviewed.    Still needing dobutamine and intermittent NE support. Occasional low PI dips to 1.8. Responding more than yesterday, able to open eyes to name and follow some simple commands. Failed PST d/t low TVs. Appears to be anuric at this point with  I/O ~ 25 ml in the last 24 hrs.     Physical Exam:   I/O last 3 completed shifts:  In: 2239.35 [I.V.:1579.35; NG/GT:450]  Out: 3049 [Urine:15; Emesis/NG output:150; Other:2884]   BP (!) 125/93   Pulse 114   Temp 97.9  F (36.6  C)   Resp 19   Wt 99 kg (218 lb 4.1 oz)   SpO2 98%   BMI 34.18 kg/m       GENERAL APPEARANCE: intubated  EYES: eyes closed, open to name   Pulmonary: mechanical ventilation  CV: LVAD hum   - hard to assess JVD   - Minimal LE edema  GI: soft, nontender, normal bowel sounds  : lombardo in place  SKIN: no rash, warm, dry, no cyanosis  NEURO: opens eyes to name and follows some simple commands    Labs:   All labs reviewed by me  Electrolytes/Renal -   Recent Labs   Lab Test 02/18/21  1052 02/18/21  0401 02/17/21  2148 02/17/21  1525 02/17/21  0350 02/17/21  0350    136 137 137   < > 134   POTASSIUM 4.4 4.4 4.6 4.6   < > 4.6   CHLORIDE 106 106 106 104   < > 104   CO2 23 24 23 24   < > 23   BUN 37* 43* 44* 48*   < > 62*   CR 2.34* 2.49* 2.71* 2.82*   < > 3.42*   * 113* 99 94   < > 77   CALOS 8.3* 8.0* 8.4* 7.8*   < > 8.6   MAG  --  2.6*  --  2.4*  --  2.5*   PHOS  --  4.4  --  5.2*  --  6.3*    < > = values in this interval not displayed.       CBC -   Recent Labs   Lab Test 02/18/21  0401 02/17/21  2148 02/17/21  0832   WBC 13.7* 13.8* 15.7*   HGB 9.0* 8.7* 8.5*    281 235       LFTs -   Recent Labs   Lab Test 02/18/21  1052 02/18/21  0401 02/17/21  2148   ALKPHOS 151* 139 130   BILITOTAL 1.6* 1.5* 1.4*   ALT  3,233* 3,378* 3,720*   AST 4,170* 4,870* 6,440*   PROTTOTAL 6.8 6.6* 6.7*   ALBUMIN 2.4* 2.2* 2.4*       Iron Panel -   Recent Labs   Lab Test 11/16/20  0616 02/08/20  0408   IRON 16* 25*   IRONSAT 6* 8*   HEAVENLY 75 189       Imaging:  Abd US with duplex: ordered     Current Medications:    allopurinol  200 mg Oral or Feeding Tube Daily     aspirin  81 mg Oral Daily     azithromycin  500 mg Intravenous Q24H     B and C vitamin Complex with folic acid  5 mL Per Feeding Tube Daily     ceFEPIme (MAXIPIME) IV  2 g Intravenous Q12H     doxazosin  1 mg Oral Daily     finasteride  5 mg Oral Daily     FLUoxetine  30 mg Oral Daily     omeprazole  20 mg Oral QAM AC     phytonadione  1 mg Intravenous Once     protein modular  1 packet Per Feeding Tube 4x Daily     senna-docusate  2 tablet Oral BID       [Held by provider] amiodarone       bivalirudin ANTICOAGULANT (ANGIOMAX) 250mg/250mL in 0.9% Sodium Chloride 0.02 mg/kg/hr (02/18/21 1300)     dextrose       dextrose Stopped (02/17/21 1600)     CRRT replacement solution 14 mL/kg/hr (02/18/21 1112)     DOBUTamine 3 mcg/kg/min (02/18/21 1300)     fentaNYL Stopped (02/17/21 1000)     insulin (regular) Stopped (02/16/21 1300)     midazolam Stopped (02/17/21 1000)     - MEDICATION INSTRUCTIONS -       norepinephrine Stopped (02/18/21 1134)     CRRT replacement solution 1.996 mL/kg/hr (02/18/21 0345)     CRRT replacement solution 12.5 mL/kg/hr (02/18/21 1120)     vasopressin Stopped (02/17/21 0045)     Jamar Harrison MD

## 2021-02-18 NOTE — PROGRESS NOTES
[facility]    Cardiology Progress Note- Cards 2        Date of Admission:  2/15/2021     Today's Plan  - Liver vascular US   - monitor off sedation  - Continue broad spectrum antibiotics     -Cefazolin (2/18/21 -     )   -Azithromycin (2/15/21 -    )    Cefepime (2/16/21 - 2/18/21) (deescalated to cefazolin)    Vancomycin (2/15/21 - 2/18/21) (MRSA nares negative)    Piperacillin tazobactam (2/15/21-2/16/21) (switched for renal protection)  - continue dobutamine 3, wean as tolerated  - continue bival  - continue CRRT - goal net even  - ongoing goals of care discussion    Assessment & Plan: SL      Eliseo Tanner is a 65 year old male with PMHx relevant for NICM with LVEF 15-20%, NYHA III s/p HM III 2/18/2020 (post op complicated by retrosternal hematoma with bleeding in the lungs), s/p ICD placed on 3/16/2020, h/o MSSA driveline infection and bacteremia 11/5/2020 on prophylactic cephalexin, h/o VT on amiodarone, moderate nonobstructive CAD, severe MR, atrial fibrillation on warfarin, hypertension, ABHINAV requiring CPAP, CKD IV, DMII, HLP,  h/o HIT who presented to the Cardiovascular Unit (4E Cardiac ICU) with mixed cardiogenic and distributive shock withan intermittent wide complex tachycardia. Stabilized on low dose dobutamine off of pressors. Minimal oxygen requirements with persistent acute renal failure.     Cardiovascular:    #Mixed Cardiogenic and Septic Shock  #Multiorgan Failure   Presented from [facility] with subacute development of shortness of breath, dyspnea on exertion, dizziness/lightheadedness with near syncopal event found to be febrile with intermittent wide complex tachycardia. Recent COVID19 moderna vaccination. CT chest showing bilateral infiltrates. Community acquired pneumonia vs. Possible recurrent of MSSA bacteremia WBC 24.5, Procal 7.95, , Lactacte 6.9.    Mildly elevated filling pressures CVP 18, PA 35/20,  PCWP 11. Likely some component of cardiogenic shock. LVAD parameters relatively stable despite markedly elevated LDH 8,531. Euvolemic on exam. Worsening renal function, Cr 4.62, up-trending LFT's/ INR. Legionella antigen positive.     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 Bhatti (ID clinic)   # Troponin elevation (likely demand ischemia)  # Essential Hypertension  # Hyperlipidemia        Patient with long standing history of NICM previously implanted LVAD (HM III) on 02/18/20 due to worsening functional status and systolic ventricular dysfunction (further complicated by RV dysfunction by VAD hemodynamic compensatory mechanism, VT in ICU now on Amiodarone and Atrial Fibrillation with AVR requiring DCCV on 02/28/20).      Elevated cardiac biomarkers on presentation, SGC with only mildly elevated filling pressures. Euvolemic on exam. Troponin elevation likely related to demand ischemia with no concerns for ACS. Most recent VAD interrogation without any significant Flow-Alarms    LDH 8,531. Initial concern for LVAD thrombus. However given relatively stable LVAD parameters, degree of LDH elevation is out of proportion. Will continue to monitor for LVAD alarms.      - continue dobutamine  - Held ACE-I/ARB/ARNi: Lisinopril; due to worsening BERNARDA  - No BB; deferred 2/2 shock  - Aldosterone antagonist: deferred while other medical therapy is optimized  - SCD prophylaxis ICD  - Fluid status : mildly hypervolemic, volume removal as tolerated with CRRT  - MAP Goal: 65-85  - On  Warfarin for HM-III INR Goal 2-3  - Hold Hydralazine 100mg given septic shock  - Continue ASA 81 mg per oral daily       # Wide complex tachycardia. Atrial Flutter vs Atrial flutter vs Ventricular Tachycardia  # History of Atrial Fibrillation s/p DCCV/history of Atrial Flutter       Wide complex tachycardia HR 140s on presentation in the setting of febrile illness and likely pneumonia. Did not initially respond to adenosine. Concern for VT. Intermittently back into HR 60s with underlying rhythm of atrial flutter 3:1 conduction block. Per EP can not rule out VT, may be dual tachycardias.      - hold Amiodarone gtt  - Ensure K+ >4.0 mEq/L and Mg+2 >2.0  - hold warfarin  - continue bivalirudin  - On cardiac telemetry         Infectious disease    #Sepsis  #Legionella Pneuamonia  #Bilateral pulmonary infiltrates  CT showing bilateral diffuse patchy consolidations concerning for infectious process. Low suspicion for recurrent driveline or possible hardware infection (history of MSSA Chronic Driveline Infection). Urine without pyuria. Urine legionella ag positive.   - f/u blood cultures  - f/u sputum legionella culture  - continue broad spectrum antibiotics    Renal:  # Acute on Chronic CKD stage IV likely 2/2 Septic Shock   Creatinine continues uptrending 3.23 > 4.62 > 4.9. LIJ Dialysis line. Ongoing CRRT.  - continue CRRT  - Daily Weight's  - Strict I/O's  - Daily BMP's  - Avoid any additional nephrotoxicity (will consider modifying antimicrobial therapy after infectious work-up is completed)      GI  # Shock Liver   # Congestive Hepatopathy  Hepatoccelular pattern of liver injury  > 3,496, AST 1,441 > 8,490 likely 2/2 to septic shock. INR elevated 2.13 and uptrending despite warfarin being held.   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 Ibarra)     # Previous Concern For HIT  On previous hospitalization for LVAD placement (02/06/20-03/20/20), concern for HIT. 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    At that time, patient underwent two HIT panel tests (first one was negative and second antibody test was positive). No known thrombosis events. DAVINA antibody was negative raising question about validity of diagnosis . Per hematology at the time (Dr. James), no other cause for isolated thrombocytopenia. Immediate recovery of plts following d/c of heparin. 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     - recommend avoiding heparin products  - start bivalirudin      Endocrine  # Type II DM     > Last Hgb A1C: 6.8 (01/06/21)     - Continue PTA Insulin Basaglar 10 Units Aq at bedtime  - On Medium sliding scale insulin  - Oral Hypoglycemia Protocol      Neurologic:  # Disorientation/Toxic Metabolic Encephalopathy  # Chronic Intracranial Small Vessel Disease        Patient reported some lightheadedness/dizziness and disorientation the days prior to hospitalization while at home. However, no reports of LOC, seizure activity, focal deficits, or findings for acute intracranial pathology on most recent OSH CT head w/o contrast (02/15/21). Now s/p intubation and sedated. Intermittently following commands    - fentanyl and midazolam for pain and sedation  - daily SAT     Diet:   NPO, will start TFs pending hospital course  DVT Prophylaxis: bivalirudin gtt  Guevara Catheter: not present  Code Status: Full Code  Fluids: no IVFs  Lines: RIJ CVC (2/15), L IJ dialysis catheter (2/16), left radial arterial line (2/15)      Disposition Plan   Expected discharge: > 7 days, recommended to transitional  care unit once fluid volume status optimized on oral medication, arrhythmia stabilized , therapeutic anticoagulation achieved and safe disposition plan/ TCU bed available.      Entered: Daniel Fleming MD 2021, 7:20 AM       The patient's care was discussed with the Attending Physician, Dr. Cisneros.    Daniel Fleming MD  [facility]  Please see sign in/sign out for up to date coverage information  ______________________________________________________________________    Interval History   Overnight no acute events. Remains intubated and sedated. Decreasing vent requirements. Started on CRRT  LFTs downtrending. LDH downtrending. Weaned off of norepi and vaso. Opening eyes and following commands overnight.    Heartmate 3 (centrifugal flow) VS  Flow (Lpm): 4.5 Lpm  Pulse Index (PI): 2.9 PI  Speed (rpm): 5500 rpm  Power (muhammad): 4.3 muhammad  Current Hct settin    Data reviewed today: I reviewed all medications, new labs and imaging results over the last 24 hours.      Physical Exam   Vital Signs: Temp: 97.7  F (36.5  C) Temp src: Esophageal  Pulse: 117   Resp: 20 SpO2: 100 % O2 Device: Mechanical Ventilator    Weight: 218 lbs 4.09 oz    In general, the patient is intubated sedated in no apparent distress.      Neck: RIJ and LIJ catheters in place.   Heart: RRR. S1 and S2 no appreciated. Mechanical whir. No murmur, rub, click, or gallop.   Lungs: Bilateral rhonchi and rales   GI: Soft, nontender, nondistended.   Extremities: trace edema.  The pulses are 2+at the radial and DP bilaterally.  Neuro: grossly non focal.   Skin: no rashes.        Data   Recent Labs   Lab 21  0401 21  2148 21  1525 21  0832 21  0832 21  0350 02/15/21  1910 02/15/21  191   WBC 13.7* 13.8*  --   --  15.7* 18.7*   < > 24.5*   HGB 9.0* 8.7*  --   --  8.5* 8.8*   < > 9.9*   MCV 73* 73*  --   --  72* 72*   < > 74*    281  --   --  235 258   < > 356    INR 2.46* 2.67*  --   --   --  4.49*   < > 2.13*    137 137   < >  --  134   < > 129*   POTASSIUM 4.4 4.6 4.6   < >  --  4.6   < > 5.1   CHLORIDE 106 106 104   < >  --  104   < > 97   CO2 24 23 24   < >  --  23   < > 16*   BUN 43* 44* 48*   < >  --  62*   < > 78*   CR 2.49* 2.71* 2.82*   < >  --  3.42*   < > 3.23*   ANIONGAP 6 8 9   < >  --  7   < > 17*   CALSO 8.0* 8.4* 7.8*   < >  --  8.6   < > 8.7   * 99 94   < >  --  77   < > 215*   ALBUMIN 2.2* 2.4* 2.3*   < >  --  2.4*   < > 3.0*   PROTTOTAL 6.6* 6.7* 6.4*   < >  --  6.7*   < > 8.2   BILITOTAL 1.5* 1.4* 1.4*   < >  --  1.4*   < > 1.0   ALKPHOS 139 130 126   < >  --  128   < > 141   ALT 3,378* 3,720* 3,804*   < >  --  3,843*   < > 768*   AST 4,870* 6,440* 6,182*   < >  --  7,648*   < > 1,441*   TROPI  --   --   --   --   --   --   --  0.377*    < > = values in this interval not displayed.     TTE 2/15/2021  Interpretation Summary  HM 3 at 5500 RPM  LV size is small, LVIDd = 3.0 cm. Severely reduced left ventricular function.  Doppler of the inflow cannula and outflow graft are normal.  RV is severely dilated. Severely reduced right ventricular function.  Mild TR is present.  The aortic valve is closed with mild regurgitation.  IVC is dilated and patient is on a ventilator.  No pericardial effusion present.

## 2021-02-18 NOTE — PROGRESS NOTES
[facility] ID Service: Follow Up Note      Patient:  Eliseo Tanner   Date of birth 1955, Medical record number [id number]  Date of Visit:  02/18/2021  Date of Admission: 2/15/2021         Assessment and Recommendations:   ID Problem List:  1. Mixed septic + cardiogenic shock with severe RV failure  2. Severe Legionella pneumophilia pneumonia (serogroup 1)  3. MSSA drive line infection  4. Oliguric BERNARDA on CKD requiring CRRT  5. Transaminitis  6. Leukocytosis  7. 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 History of NICM s/p HM III (2/18/20), ICD placement (3/16/20)  9. Received 1st dose of the Moderna covid 19 vaccine on 2/11, scheduled for 2nd dose on 3/11/21 at home pharmacy    Recommendations:  1. Continue azithromycin 500mg IV daily   2. Stop Cefepime  3. Start Cefazolin 2g IV q12hrs for MSSA  4. Vancomycin has been stopped  5. Blood and legionella cultures pending  6. Check serum HSV and VZV PCR- reactivation could be contributing to LFT elevations  7. E.faecium likely colonizing  8. 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 CXR and CT chest was significant for bilateral consolidative opacities-right worse then left. Urine legionella antigen was positive for pneumophilia serogroup 1.  COVID 19, RVP and influenza testing negative. Supporting evidence for legionella pneumonia in this circumstance includes CT findings, hyponatremia, transaminitis, CRP elevation and positive urine antigen.  Underlying risk factor for severity of  "disease is age, underlying cardiovascular and renal disease. Marked transaminitis on arrival, now down-trending. Would check HSV and VZV PCR from blood as they may cause LFT elevations. Light growth of E.faecium on sputum culture, likely colonizing as he is improving without treating and has risk factors for colonization.     The driveline infection overall seems stable based on imaging and that the wife noted improved driveline drainage after increasing cephalexin dose ~ 1 week ago. No evidence of bacteremia. While in the hospital will keep on IV antibiotics to get burden of infection down.      Recs were discussed with primary team today. Don't hesitate to call with questions.     Attestation:  I have reviewed today's vital signs, medications, labs and imaging.  Anaid Redding PA-C, Pager # [phone]            Interval History:       Afebrile. Remains on CRRT. Failed PS trial this morning, remains intubated. Continuing video EEG.         Review of Systems:   Unable to obtain, patient sedated and intubated in ICU.          Current Antimicrobials   Current:  - Azithromycin (start 2/16)  - Cefepime (start 2/16)    Prior:  - Zosyn (2/15-2/16)  - Vancomycin (2/15-2/17)        History of Present Illness:   Per initial ID consult on 2/16/21:  \"Patient is known to me from the outpatient setting. I last had a telephone visit with him on 2/4 for a ongoing drive line infection. At this time I increased his suppression cephalexin from 500 mg po q 12 hours to 500 mg po q 6 hours. On 2/9 he was seen by VAD coordinator and PA for a device check. Noted to have some drainage from the drive line exit site. Culture revealed MSSA. 2/8 CT a/p revealed stranding along the drive line in the subcutaneous tissue. No discrete fluid collections. Interesting, during this visit the patient felt very good.   Drainage from driveline improved after dose increase of cephalexin.      Received 1st dose of the Moderna covid 19 vaccine on 2/11. " "2/12-2/14 noted to have general malaise, decreased appetite. During this time his wife took temp-~99. Noted he had a dry cough but it was not frequent. Patient did not complain of nausea, vomiting or diarrhea.      On the morning of 2/15 he fell walking in the bathroom. [facility] went to [facility] and then transferred to [facility]. At [facility] he received a dose of azithromycin and ceftriaxone.  On admission noted to be febrile with a leukocytosis and lactic acidosis. After transfer started on empiric pip/tazo and vancomycin. CT chest revealed diffuse bilateral patchy groundglass consolidations (right worse then left), LAD, diffuse mesenteric edema and ascites. Overnight patient decompensated requiring intubation and initiation of CRRT. Urine legionella positive for pneumophilia serogroup 1 antigen. Influenza, RVP and covid 19 pcr negative.\"         Physical Exam:   Ranges for vital signs:  Temp:  [97  F (36.1  C)-99.7  F (37.6  C)] 97  F (36.1  C)  Pulse:  [103-117] 112  Resp:  [20] 20  MAP:  [43 mmHg-164 mmHg] 79 mmHg  Arterial Line BP: ()/(42-85) 87/65  FiO2 (%):  [40 %] 40 %  SpO2:  [53 %-100 %] 100 %    Intake/Output Summary (Last 24 hours) at 2/17/2021 0847  Last data filed at 2/17/2021 0800  Gross per 24 hour   Intake 1715 ml   Output 3046 ml   Net -1331 ml     Exam:  GENERAL:  Sedated, intubated in ICU.   ENT:  Head is normocephalic, atraumatic. EEG pads remain in place. Oropharynx is moist, ETT in place  EYES:  Eyes have anicteric sclerae.    LUNGS:  +mechanical vent sounds, coarse bases.  CARDIOVASCULAR:  +LVAD hum.  ABDOMEN:  Soft, non-distended, +bowel sounds.  EXT: Extremities warm and without edema.  SKIN:  No acute rashes.  Bilat internal jugular lines in place without surrounding erythema or purulence.         Laboratory Data:   Reviewed.  Pertinent for:    Culture data:  Microbiology:  Culture Micro   Date Value Ref Range Status   02/17/2021 No growth after 1 day  Preliminary "   02/17/2021 No growth after 1 day  Preliminary   02/16/2021 (A)  Preliminary    Light growth  Enterococcus faecium  Susceptibility testing in progress     02/16/2021 Culture negative monitoring continues  Preliminary   02/15/2021 (A)  Preliminary    Moderate growth  Staphylococcus aureus  Susceptibility testing in progress     02/15/2021 (A)  Preliminary    Moderate growth  Strain 2  Staphylococcus aureus  Susceptibility testing in progress     02/15/2021 (A)  Preliminary    Moderate growth  Strain 3  Staphylococcus aureus  Susceptibility testing in progress     02/15/2021 Light growth  Normal skin freeman    Preliminary   02/15/2021 (A)  Final    Canceled, Test credited  >10 Squamous epithelial cells/low power field indicates oral contamination. Please   recollect.  Notification of test cancellation was given to  Bethanie Murillo RN on 2.15.21 at 2326. JRT     02/15/2021 No growth after 3 days  Preliminary   02/15/2021 No growth after 3 days  Preliminary   02/08/2021 No growth  Final   02/08/2021 No growth  Final   02/08/2021 Moderate growth  Staphylococcus aureus   (A)  Final   02/08/2021 Moderate growth  Strain 2  Staphylococcus aureus   (A)  Final   02/08/2021 No anaerobes isolated  Final   12/17/2020 No anaerobes isolated  Final   12/17/2020 Light growth  Staphylococcus aureus   (A)  Final   11/17/2020 No growth  Final   11/17/2020 No growth  Final   11/16/2020 No growth  Final   11/16/2020 No growth  Final   11/16/2020 Moderate growth  Staphylococcus aureus   (A)  Final   11/16/2020 Moderate growth  Strain 2  Staphylococcus aureus   (A)  Final   11/15/2020 No growth  Final   11/15/2020 No growth  Final   11/14/2020 (A)  Final    Cultured on the 1st day of incubation:  Staphylococcus aureus     11/14/2020   Final    Critical Value/Significant Value, preliminary result only, called to and read back by  ROSA ACE, KRISTEN 1553 11.15.20 NDP     11/14/2020   Final    (Note)  POSITIVE for STAPHYLOCOCCUS AUREUS and NEGATIVE  for the mecA gene  (not MRSA) by Verigene multiplex nucleic acid test. The mecA gene was  not detected. Final identification and antimicrobial susceptibility  testing will be verified by standard methods.    Specimen tested with Verigene multiplex, gram-positive blood culture  nucleic acid test for the following targets: Staph aureus, Staph  epidermidis, Staph lugdunensis, other Staph species, Enterococcus  faecalis, Enterococcus faecium, Streptococcus species, S. agalactiae,  S. anginosus grp., S. pneumoniae, S. pyogenes, Listeria sp., mecA  (methicillin resistance) and Ernst/B (vancomycin resistance).    Critical Value/Significant Value called to and read back by Sha Fontenot Rn 1835 11.15.20 NDP     11/14/2020 (A)  Final    Cultured on the 1st day of incubation:  Staphylococcus aureus  Susceptibility testing done on previous specimen     11/14/2020   Final    Critical Value/Significant Value, preliminary result only, called to and read back by  PATRICIO SHANNON RN 2101 11.15.20 NDP     09/21/2020 No growth  Final   09/21/2020 No growth  Final   03/13/2020 No growth  Final   03/13/2020 No growth  Final   03/03/2020 No growth  Final   03/03/2020 No growth  Final   02/22/2020 No growth  Final   02/22/2020 No growth  Final   02/16/2020 No growth  Final   02/16/2020 No growth  Final   02/15/2020 No growth  Final   02/15/2020 (A)  Final    Cultured on the 2nd day of incubation:  Staphylococcus epidermidis     02/15/2020   Final    Critical Value/Significant Value, preliminary result only, called to and read back by  Cee Benavidez RN on 2.16.20 at 2220.  JRT     02/15/2020   Final    (Note)  POSITIVE for STAPHYLOCOCCUS EPIDERMIDIS and POSITIVE for the mecA  gene (resistant to methicillin) by Verigene multiplex nucleic acid  test. Final identification and antimicrobial susceptibility testing  will be verified by standard methods.    Specimen tested with Verigene multiplex, gram-positive blood culture  nucleic acid  test for the following targets: Staph aureus, Staph  epidermidis, Staph lugdunensis, other Staph species, Enterococcus  faecalis, Enterococcus faecium, Streptococcus species, S. agalactiae,  S. anginosus grp., S. pneumoniae, S. pyogenes, Listeria sp., mecA  (methicillin resistance) and Ernst/B (vancomycin resistance).    Critical Value/Significant Value called to and read back by Cee Benavidez RN, 2.17.20 @ 0207 pt.     02/14/2020 No growth  Final   02/13/2020 (A)  Final    Cultured on the 1st day of incubation:  Proteus mirabilis  Susceptibility testing done on previous specimen     02/13/2020   Final    Critical Value/Significant Value, preliminary result only, called to and read back by  Mima Cabrera RN. @1426. 2.13.20. .      02/13/2020 (A)  Final    Cultured on the 1st day of incubation:  Enterococcus faecalis  Susceptibility testing done on previous specimen     02/13/2020   Final    Critical Value/Significant Value, preliminary result only, called to and read back by  Alisson Castillo RN on 2.13.20 at 1728.  T     02/13/2020   Final    (Note)  POSITIVE for ENTEROCOCCUS FAECALIS and NEGATIVE for Ernst/vanB genes  by Verigene multiplex nucleic acid test. Final identification and  antimicrobial susceptibility testing will be verified by standard  methods.    Specimen tested with Verigene multiplex, gram-positive blood culture  nucleic acid test for the following targets: Staph aureus, Staph  epidermidis, Staph lugdunensis, other Staph species, Enterococcus  faecalis, Enterococcus faecium, Streptococcus species, S. agalactiae,  S. anginosus grp., S. pneumoniae, S. pyogenes, Listeria sp., mecA  (methicillin resistance) and Ernst/B (vancomycin resistance).    Critical Value/Significant Value called to and read back by MARIA EUGENIA MILLAN RN 2/13/20 2036          02/13/2020 (A)  Final    Cultured on the 1st day of incubation:  Proteus mirabilis     02/13/2020   Final    Critical Value/Significant Value,  preliminary result only, called to and read back by  Mima Cabrera RN. @1426. 2.13.20. BS.      02/13/2020 (A)  Final    Cultured on the 1st day of incubation:  Enterococcus faecalis     02/13/2020   Final    Critical Value/Significant Value, preliminary result only, called to and read back by  Alisson Leon RN on 2.13.20 at 1728.  JRT     02/13/2020   Final    (Note)  POSITIVE for PROTEUS SPECIES by [employer]igene multiplex nucleic acid test.  Final identification and antimicrobial susceptibility testing will be  verified by standard methods.    Specimen tested with Verigene multiplex, gram-negative blood culture  nucleic acid test for the following targets: Acinetobacter sp.,  Citrobacter sp., Enterobacter sp., Proteus sp., E. coli, K.  pneumoniae/oxytoca, P. aeruginosa, and the following resistance  markers: CTXM, KPC, NDM, VIM, IMP and OXA.    Critical Value/Significant Value called to and read back by  Alisson Leon RN @ 1650. 2/13/20. AV     02/13/2020 No growth  Final       Inflammatory Markers    Recent Labs   Lab Test 02/16/21  2219 02/15/21  1910 02/11/21  0838 12/17/20  0756   SED 72* 47*  --   --    .0* 270.0* 8.6 8.0       Hematology Studies    Recent Labs   Lab Test 02/18/21 0401 02/17/21 2148 02/17/21  0832 02/17/21  0350   WBC 13.7* 13.8* 15.7* 18.7*   HGB 9.0* 8.7* 8.5* 8.8*   MCV 73* 73* 72* 72*    281 235 258     Recent Labs   Lab Test 02/18/21  0401 02/17/21  0832 02/17/21  0350 02/16/21  0215   ANEU 12.5* 14.6* 17.3* 22.4*   AEOS 0.1 0.0 0.0 0.0       Metabolic Studies     Recent Labs   Lab Test 02/18/21  0401 02/17/21 2148 02/17/21  1525 02/17/21  1205    137 137 136   POTASSIUM 4.4 4.6 4.6 4.5   CHLORIDE 106 106 104 106   CO2 24 23 24 25   BUN 43* 44* 48* 52*   CR 2.49* 2.71* 2.82* 3.03*   GFRESTIMATED 26* 23* 22* 20*       Hepatic Studies    Recent Labs   Lab Test 02/18/21  0401 02/17/21  2148 02/17/21  1525   BILITOTAL 1.5* 1.4* 1.4*   ALKPHOS 139 130 126    ALBUMIN 2.2* 2.4* 2.3*   AST 4,870* 6,440* 6,182*   ALT 3,378* 3,720* 3,804*            Imaging:     CXR (2/17/2021)  Findings:   Right internal jugular Raywick-Chucky catheter tip projects over the right  main pulmonary artery. Left internal jugular central venous catheter  tip at the brachiocephalic confluence. Stable left chest wall  implantable cardiac defibrillator and LVAD. Median sternotomy wires.  Endotracheal tube tip at the mid to low thoracic trachea. Enteric tube  sidehole projects over the stomach. Stable enlarged cardiac  silhouette. The pulmonary vasculature is indistinct. Low lung volumes  with streaky perihilar and medial bibasilar opacities. Possible trace  bilateral pleural effusions. No pneumothorax.    CT Chest/abd/pelvis (2/16/21)  IMPRESSION:  1. Overall stable patchy consolidative pulmonary opacities, concerning  for pneumonia.  2. Stable prominent and borderline enlarged mediastinal lymph nodes,  favored to be reactive.  3. Mild increase in small bilateral pleural effusions, greater on the  right, with associated compressive atelectasis.  4. Overall stable LVAD drive line appearance in the superior abdominal  wall without discrete fluid collection or significant inflammation.  5. Mild increase in diffuse intra-abdominal ascites.    CT Chest/abd/pelvis (2/15/21)  IMPRESSION:   1. Diffuse patchy consolidations throughout both lungs concerning for  an acute infectious process. Interval increase in size of multiple  mediastinal lymph nodes measuring up to 10 mm, likely reactive.  2. Small right-sided pleural effusion and trace left-sided pleural  effusion.  3. Postsurgical changes of LVAD placement with mild inflammatory soft  tissue changes about the drive line traversing the subcutaneous fat  and superior right rectus abdominis musculature, concerning for drive  line infection, similar to prior.   4. Diffuse mesenteric edema and small amount of intra-abdominal  ascites, new from prior. Fluid  within the abdomen and pelvis measures  low density.  5. Mild diffuse bladder wall thickening, this can be seen with  cystitis. Recommend clinical correlation.

## 2021-02-18 NOTE — PLAN OF CARE
Pt intubated. Sedation off since 10AM however RASS of -3. Pt following commands intermittently. Trial PS once when pt was more alert. Pt did not tolerate so converted back to CMV by RT. Pt continues on CRRT w/ goal of  ml/hr fluid pull. Pt started on TF at rate of 15 ml/hr. Will advance by 15 ml q8hr until goal of 60 ml/hr. BG 87-90's and did not need to restart insulin drip. Pt turned and repositioned q2hr for comfort and skin integrity.

## 2021-02-18 NOTE — PROGRESS NOTES
CRRT STATUS NOTE    DATA:  Time:  3:26 AM  Pressures WNL:  YES  Filter Status:  WDL    Problems Reported/Alarms Noted:  nne    Supplies Present:  YES    ASSESSMENT:  Patient Net Fluid Balance:  Net negative 1670 2/17; net negative 516 since midnight; net negative 1190 since admission   Vital Signs:  Vitals reviewed.  Dobutamine 3 mcg/kg/min IV.  Heartmate 3.  's, Vent support with FiO2 40% and O2 sats >925%  Labs: Labs reviewed. Elevated LFT's (AST?ALT) but starting to trend down. INR 2.46  Goals of Therapy:  ml/hr as tolerated     INTERVENTIONS:   flowsheets reviewed and plan of care discussed    PLAN:  Fluid removal as tolerated per goals of therapy.  Monitor filter status and change 72 hours.  Please call CRRT resource RN at [phone] with questions and concers.

## 2021-02-19 ENCOUNTER — APPOINTMENT (OUTPATIENT)
Dept: OCCUPATIONAL THERAPY | Facility: CLINIC | Age: 66
DRG: 870 | End: 2021-02-19
Attending: INTERNAL MEDICINE
Payer: MEDICARE

## 2021-02-19 ENCOUNTER — APPOINTMENT (OUTPATIENT)
Dept: GENERAL RADIOLOGY | Facility: CLINIC | Age: 66
DRG: 870 | End: 2021-02-19
Attending: STUDENT IN AN ORGANIZED HEALTH CARE EDUCATION/TRAINING PROGRAM
Payer: MEDICARE

## 2021-02-19 ENCOUNTER — APPOINTMENT (OUTPATIENT)
Dept: GENERAL RADIOLOGY | Facility: CLINIC | Age: 66
DRG: 870 | End: 2021-02-19
Payer: MEDICARE

## 2021-02-19 LAB
ALBUMIN SERPL-MCNC: 2 G/DL (ref 3.4–5)
ALBUMIN SERPL-MCNC: 2.2 G/DL (ref 3.4–5)
ALP SERPL-CCNC: 182 U/L (ref 40–150)
ALP SERPL-CCNC: 187 U/L (ref 40–150)
ALP SERPL-CCNC: 194 U/L (ref 40–150)
ALP SERPL-CCNC: 200 U/L (ref 40–150)
ALT SERPL W P-5'-P-CCNC: 1419 U/L (ref 0–70)
ALT SERPL W P-5'-P-CCNC: 1878 U/L (ref 0–70)
ALT SERPL W P-5'-P-CCNC: 2035 U/L (ref 0–70)
ALT SERPL W P-5'-P-CCNC: 2481 U/L (ref 0–70)
ANION GAP SERPL CALCULATED.3IONS-SCNC: 2 MMOL/L (ref 3–14)
ANION GAP SERPL CALCULATED.3IONS-SCNC: 4 MMOL/L (ref 3–14)
ANION GAP SERPL CALCULATED.3IONS-SCNC: 6 MMOL/L (ref 3–14)
ANION GAP SERPL CALCULATED.3IONS-SCNC: 7 MMOL/L (ref 3–14)
ANISOCYTOSIS BLD QL SMEAR: ABNORMAL
APTT PPP: 49 SEC (ref 22–37)
AST SERPL W P-5'-P-CCNC: 1570 U/L (ref 0–45)
AST SERPL W P-5'-P-CCNC: 2036 U/L (ref 0–45)
AST SERPL W P-5'-P-CCNC: 2144 U/L (ref 0–45)
AST SERPL W P-5'-P-CCNC: 2642 U/L (ref 0–45)
BACTERIA SPEC CULT: ABNORMAL
BASE DEFICIT BLDA-SCNC: 0.2 MMOL/L
BASE DEFICIT BLDA-SCNC: 1.3 MMOL/L
BASE DEFICIT BLDV-SCNC: 2 MMOL/L
BASE EXCESS BLDA CALC-SCNC: 0.1 MMOL/L
BASE EXCESS BLDV CALC-SCNC: 0.3 MMOL/L
BASE EXCESS BLDV CALC-SCNC: 0.6 MMOL/L
BASE EXCESS BLDV CALC-SCNC: 1.9 MMOL/L
BASOPHILS # BLD AUTO: 0 10E9/L (ref 0–0.2)
BASOPHILS NFR BLD AUTO: 0 %
BILIRUB SERPL-MCNC: 0.9 MG/DL (ref 0.2–1.3)
BILIRUB SERPL-MCNC: 1 MG/DL (ref 0.2–1.3)
BILIRUB SERPL-MCNC: 1 MG/DL (ref 0.2–1.3)
BILIRUB SERPL-MCNC: 1.2 MG/DL (ref 0.2–1.3)
BUN SERPL-MCNC: 32 MG/DL (ref 7–30)
BUN SERPL-MCNC: 33 MG/DL (ref 7–30)
BUN SERPL-MCNC: 35 MG/DL (ref 7–30)
BUN SERPL-MCNC: 37 MG/DL (ref 7–30)
BURR CELLS BLD QL SMEAR: ABNORMAL
CA-I BLD-MCNC: 4.3 MG/DL (ref 4.4–5.2)
CA-I BLD-MCNC: 4.4 MG/DL (ref 4.4–5.2)
CA-I BLD-MCNC: 4.6 MG/DL (ref 4.4–5.2)
CA-I SERPL ISE-MCNC: 4.5 MG/DL (ref 4.4–5.2)
CALCIUM SERPL-MCNC: 7.5 MG/DL (ref 8.5–10.1)
CALCIUM SERPL-MCNC: 8.1 MG/DL (ref 8.5–10.1)
CALCIUM SERPL-MCNC: 8.1 MG/DL (ref 8.5–10.1)
CALCIUM SERPL-MCNC: 8.2 MG/DL (ref 8.5–10.1)
CHLORIDE SERPL-SCNC: 105 MMOL/L (ref 94–109)
CHLORIDE SERPL-SCNC: 107 MMOL/L (ref 94–109)
CHLORIDE SERPL-SCNC: 107 MMOL/L (ref 94–109)
CHLORIDE SERPL-SCNC: 108 MMOL/L (ref 94–109)
CO2 SERPL-SCNC: 23 MMOL/L (ref 20–32)
CO2 SERPL-SCNC: 25 MMOL/L (ref 20–32)
CO2 SERPL-SCNC: 26 MMOL/L (ref 20–32)
CO2 SERPL-SCNC: 26 MMOL/L (ref 20–32)
CREAT SERPL-MCNC: 1.98 MG/DL (ref 0.66–1.25)
CREAT SERPL-MCNC: 1.99 MG/DL (ref 0.66–1.25)
CREAT SERPL-MCNC: 2 MG/DL (ref 0.66–1.25)
CREAT SERPL-MCNC: 2.05 MG/DL (ref 0.66–1.25)
DIFFERENTIAL METHOD BLD: ABNORMAL
ELLIPTOCYTES BLD QL SMEAR: SLIGHT
EOSINOPHIL # BLD AUTO: 0.4 10E9/L (ref 0–0.7)
EOSINOPHIL NFR BLD AUTO: 3.6 %
ERYTHROCYTE [DISTWIDTH] IN BLOOD BY AUTOMATED COUNT: 20.5 % (ref 10–15)
ERYTHROCYTE [DISTWIDTH] IN BLOOD BY AUTOMATED COUNT: 20.8 % (ref 10–15)
FACT X ACT/NOR PPP CHRO: 50 % (ref 70–130)
GFR SERPL CREATININE-BSD FRML MDRD: 32 ML/MIN/{1.73_M2}
GFR SERPL CREATININE-BSD FRML MDRD: 33 ML/MIN/{1.73_M2}
GFR SERPL CREATININE-BSD FRML MDRD: 34 ML/MIN/{1.73_M2}
GFR SERPL CREATININE-BSD FRML MDRD: 34 ML/MIN/{1.73_M2}
GLUCOSE BLDC GLUCOMTR-MCNC: 115 MG/DL (ref 70–99)
GLUCOSE BLDC GLUCOMTR-MCNC: 123 MG/DL (ref 70–99)
GLUCOSE BLDC GLUCOMTR-MCNC: 129 MG/DL (ref 70–99)
GLUCOSE BLDC GLUCOMTR-MCNC: 132 MG/DL (ref 70–99)
GLUCOSE BLDC GLUCOMTR-MCNC: 147 MG/DL (ref 70–99)
GLUCOSE BLDC GLUCOMTR-MCNC: 157 MG/DL (ref 70–99)
GLUCOSE BLDC GLUCOMTR-MCNC: 167 MG/DL (ref 70–99)
GLUCOSE BLDC GLUCOMTR-MCNC: 168 MG/DL (ref 70–99)
GLUCOSE BLDC GLUCOMTR-MCNC: 171 MG/DL (ref 70–99)
GLUCOSE BLDC GLUCOMTR-MCNC: 198 MG/DL (ref 70–99)
GLUCOSE SERPL-MCNC: 141 MG/DL (ref 70–99)
GLUCOSE SERPL-MCNC: 153 MG/DL (ref 70–99)
GLUCOSE SERPL-MCNC: 179 MG/DL (ref 70–99)
GLUCOSE SERPL-MCNC: 182 MG/DL (ref 70–99)
HCO3 BLD-SCNC: 23 MMOL/L (ref 21–28)
HCO3 BLD-SCNC: 24 MMOL/L (ref 21–28)
HCO3 BLD-SCNC: 25 MMOL/L (ref 21–28)
HCO3 BLDV-SCNC: 23 MMOL/L (ref 21–28)
HCO3 BLDV-SCNC: 26 MMOL/L (ref 21–28)
HCO3 BLDV-SCNC: 26 MMOL/L (ref 21–28)
HCO3 BLDV-SCNC: 27 MMOL/L (ref 21–28)
HCT VFR BLD AUTO: 28.3 % (ref 40–53)
HCT VFR BLD AUTO: 29.3 % (ref 40–53)
HGB BLD-MCNC: 8.5 G/DL (ref 13.3–17.7)
HGB BLD-MCNC: 9.1 G/DL (ref 13.3–17.7)
HSV1 DNA SPEC QL NAA+PROBE: NEGATIVE
HSV2 DNA SPEC QL NAA+PROBE: NEGATIVE
INR PPP: 1.98 (ref 0.86–1.14)
LACTATE BLD-SCNC: 1 MMOL/L (ref 0.7–2)
LACTATE BLD-SCNC: 1 MMOL/L (ref 0.7–2)
LACTATE BLD-SCNC: 1.1 MMOL/L (ref 0.7–2)
LACTATE BLD-SCNC: 1.3 MMOL/L (ref 0.7–2)
LDH SERPL L TO P-CCNC: 545 U/L (ref 85–227)
LYMPHOCYTES # BLD AUTO: 0.5 10E9/L (ref 0.8–5.3)
LYMPHOCYTES NFR BLD AUTO: 4.5 %
Lab: ABNORMAL
MAGNESIUM SERPL-MCNC: 2.6 MG/DL (ref 1.6–2.3)
MAGNESIUM SERPL-MCNC: 2.6 MG/DL (ref 1.6–2.3)
MAGNESIUM SERPL-MCNC: 2.7 MG/DL (ref 1.6–2.3)
MCH RBC QN AUTO: 22.1 PG (ref 26.5–33)
MCH RBC QN AUTO: 22.8 PG (ref 26.5–33)
MCHC RBC AUTO-ENTMCNC: 30 G/DL (ref 31.5–36.5)
MCHC RBC AUTO-ENTMCNC: 31.1 G/DL (ref 31.5–36.5)
MCV RBC AUTO: 73 FL (ref 78–100)
MCV RBC AUTO: 74 FL (ref 78–100)
MDC_IDC_EPISODE_DTM: NORMAL
MDC_IDC_EPISODE_DURATION: 480 S
MDC_IDC_EPISODE_ID: 222
MDC_IDC_EPISODE_TYPE: NORMAL
MDC_IDC_LEAD_IMPLANT_DT: NORMAL
MDC_IDC_LEAD_LOCATION: NORMAL
MDC_IDC_LEAD_LOCATION_DETAIL_1: NORMAL
MDC_IDC_LEAD_MFG: NORMAL
MDC_IDC_LEAD_MODEL: NORMAL
MDC_IDC_LEAD_POLARITY_TYPE: NORMAL
MDC_IDC_LEAD_SERIAL: NORMAL
MDC_IDC_LEAD_SPECIAL_FUNCTION: NORMAL
MDC_IDC_MSMT_BATTERY_DTM: NORMAL
MDC_IDC_MSMT_BATTERY_REMAINING_LONGEVITY: 133 MO
MDC_IDC_MSMT_BATTERY_RRT_TRIGGER: 2.73
MDC_IDC_MSMT_BATTERY_STATUS: NORMAL
MDC_IDC_MSMT_BATTERY_VOLTAGE: 3.03 V
MDC_IDC_MSMT_CAP_CHARGE_DTM: NORMAL
MDC_IDC_MSMT_CAP_CHARGE_ENERGY: 18 J
MDC_IDC_MSMT_CAP_CHARGE_TIME: 3.82
MDC_IDC_MSMT_CAP_CHARGE_TYPE: NORMAL
MDC_IDC_MSMT_LEADCHNL_RV_IMPEDANCE_VALUE: 304 OHM
MDC_IDC_MSMT_LEADCHNL_RV_IMPEDANCE_VALUE: 418 OHM
MDC_IDC_MSMT_LEADCHNL_RV_PACING_THRESHOLD_AMPLITUDE: 0.5 V
MDC_IDC_MSMT_LEADCHNL_RV_PACING_THRESHOLD_PULSEWIDTH: 0.4 MS
MDC_IDC_MSMT_LEADCHNL_RV_SENSING_INTR_AMPL: 11.25 MV
MDC_IDC_MSMT_LEADCHNL_RV_SENSING_INTR_AMPL: 16.12 MV
MDC_IDC_PG_IMPLANT_DTM: NORMAL
MDC_IDC_PG_MFG: NORMAL
MDC_IDC_PG_MODEL: NORMAL
MDC_IDC_PG_SERIAL: NORMAL
MDC_IDC_PG_TYPE: NORMAL
MDC_IDC_SESS_CLINIC_NAME: NORMAL
MDC_IDC_SESS_DTM: NORMAL
MDC_IDC_SESS_TYPE: NORMAL
MDC_IDC_SET_BRADY_HYSTRATE: NORMAL
MDC_IDC_SET_BRADY_LOWRATE: 40 {BEATS}/MIN
MDC_IDC_SET_BRADY_MODE: NORMAL
MDC_IDC_SET_LEADCHNL_RV_PACING_AMPLITUDE: 2 V
MDC_IDC_SET_LEADCHNL_RV_PACING_ANODE_ELECTRODE_1: NORMAL
MDC_IDC_SET_LEADCHNL_RV_PACING_ANODE_LOCATION_1: NORMAL
MDC_IDC_SET_LEADCHNL_RV_PACING_CAPTURE_MODE: NORMAL
MDC_IDC_SET_LEADCHNL_RV_PACING_CATHODE_ELECTRODE_1: NORMAL
MDC_IDC_SET_LEADCHNL_RV_PACING_CATHODE_LOCATION_1: NORMAL
MDC_IDC_SET_LEADCHNL_RV_PACING_POLARITY: NORMAL
MDC_IDC_SET_LEADCHNL_RV_PACING_PULSEWIDTH: 0.4 MS
MDC_IDC_SET_LEADCHNL_RV_SENSING_ANODE_ELECTRODE_1: NORMAL
MDC_IDC_SET_LEADCHNL_RV_SENSING_ANODE_LOCATION_1: NORMAL
MDC_IDC_SET_LEADCHNL_RV_SENSING_CATHODE_ELECTRODE_1: NORMAL
MDC_IDC_SET_LEADCHNL_RV_SENSING_CATHODE_LOCATION_1: NORMAL
MDC_IDC_SET_LEADCHNL_RV_SENSING_POLARITY: NORMAL
MDC_IDC_SET_LEADCHNL_RV_SENSING_SENSITIVITY: 0.3 MV
MDC_IDC_SET_ZONE_DETECTION_BEATS_DENOMINATOR: 40 {BEATS}
MDC_IDC_SET_ZONE_DETECTION_BEATS_NUMERATOR: 30 {BEATS}
MDC_IDC_SET_ZONE_DETECTION_INTERVAL: 270 MS
MDC_IDC_SET_ZONE_DETECTION_INTERVAL: 340 MS
MDC_IDC_SET_ZONE_DETECTION_INTERVAL: 360 MS
MDC_IDC_SET_ZONE_DETECTION_INTERVAL: NORMAL
MDC_IDC_SET_ZONE_TYPE: NORMAL
MDC_IDC_STAT_AT_BURDEN_PERCENT: 0.1 %
MDC_IDC_STAT_AT_DTM_END: NORMAL
MDC_IDC_STAT_AT_DTM_START: NORMAL
MDC_IDC_STAT_BRADY_DTM_END: NORMAL
MDC_IDC_STAT_BRADY_DTM_START: NORMAL
MDC_IDC_STAT_BRADY_RV_PERCENT_PACED: 0.01 %
MDC_IDC_STAT_EPISODE_RECENT_COUNT: 0
MDC_IDC_STAT_EPISODE_RECENT_COUNT: 5
MDC_IDC_STAT_EPISODE_RECENT_COUNT_DTM_END: NORMAL
MDC_IDC_STAT_EPISODE_RECENT_COUNT_DTM_START: NORMAL
MDC_IDC_STAT_EPISODE_TOTAL_COUNT: 0
MDC_IDC_STAT_EPISODE_TOTAL_COUNT: 222
MDC_IDC_STAT_EPISODE_TOTAL_COUNT_DTM_END: NORMAL
MDC_IDC_STAT_EPISODE_TOTAL_COUNT_DTM_START: NORMAL
MDC_IDC_STAT_EPISODE_TYPE: NORMAL
MDC_IDC_STAT_TACHYTHERAPY_ATP_DELIVERED_RECENT: 0
MDC_IDC_STAT_TACHYTHERAPY_ATP_DELIVERED_TOTAL: 0
MDC_IDC_STAT_TACHYTHERAPY_RECENT_DTM_END: NORMAL
MDC_IDC_STAT_TACHYTHERAPY_RECENT_DTM_START: NORMAL
MDC_IDC_STAT_TACHYTHERAPY_SHOCKS_ABORTED_RECENT: 0
MDC_IDC_STAT_TACHYTHERAPY_SHOCKS_ABORTED_TOTAL: 0
MDC_IDC_STAT_TACHYTHERAPY_SHOCKS_DELIVERED_RECENT: 0
MDC_IDC_STAT_TACHYTHERAPY_SHOCKS_DELIVERED_TOTAL: 0
MDC_IDC_STAT_TACHYTHERAPY_TOTAL_DTM_END: NORMAL
MDC_IDC_STAT_TACHYTHERAPY_TOTAL_DTM_START: NORMAL
MICROCYTES BLD QL SMEAR: PRESENT
MONOCYTES # BLD AUTO: 0.3 10E9/L (ref 0–1.3)
MONOCYTES NFR BLD AUTO: 2.7 %
MYELOCYTES # BLD: 0.2 10E9/L
MYELOCYTES NFR BLD MANUAL: 1.8 %
NEUTROPHILS # BLD AUTO: 10.1 10E9/L (ref 1.6–8.3)
NEUTROPHILS NFR BLD AUTO: 87.4 %
NRBC # BLD AUTO: 0.4 10*3/UL
NRBC BLD AUTO-RTO: 4 /100
O2/TOTAL GAS SETTING VFR VENT: 40 %
OXYHGB MFR BLD: 97 % (ref 92–100)
OXYHGB MFR BLDV: 48 %
OXYHGB MFR BLDV: 50 %
OXYHGB MFR BLDV: 53 %
OXYHGB MFR BLDV: 56 %
PCO2 BLD: 36 MM HG (ref 35–45)
PCO2 BLD: 37 MM HG (ref 35–45)
PCO2 BLD: 40 MM HG (ref 35–45)
PCO2 BLDV: 41 MM HG (ref 40–50)
PCO2 BLDV: 41 MM HG (ref 40–50)
PCO2 BLDV: 43 MM HG (ref 40–50)
PCO2 BLDV: 45 MM HG (ref 40–50)
PH BLD: 7.4 PH (ref 7.35–7.45)
PH BLD: 7.41 PH (ref 7.35–7.45)
PH BLD: 7.43 PH (ref 7.35–7.45)
PH BLDV: 7.36 PH (ref 7.32–7.43)
PH BLDV: 7.38 PH (ref 7.32–7.43)
PH BLDV: 7.39 PH (ref 7.32–7.43)
PH BLDV: 7.4 PH (ref 7.32–7.43)
PHOSPHATE SERPL-MCNC: 3.4 MG/DL (ref 2.5–4.5)
PHOSPHATE SERPL-MCNC: 3.8 MG/DL (ref 2.5–4.5)
PLATELET # BLD AUTO: 241 10E9/L (ref 150–450)
PLATELET # BLD AUTO: 259 10E9/L (ref 150–450)
PLATELET # BLD EST: ABNORMAL 10*3/UL
PO2 BLD: 108 MM HG (ref 80–105)
PO2 BLD: 122 MM HG (ref 80–105)
PO2 BLD: 93 MM HG (ref 80–105)
PO2 BLDV: 31 MM HG (ref 25–47)
PO2 BLDV: 35 MM HG (ref 25–47)
POIKILOCYTOSIS BLD QL SMEAR: ABNORMAL
POLYCHROMASIA BLD QL SMEAR: ABNORMAL
POTASSIUM SERPL-SCNC: 3.9 MMOL/L (ref 3.4–5.3)
POTASSIUM SERPL-SCNC: 4.1 MMOL/L (ref 3.4–5.3)
POTASSIUM SERPL-SCNC: 4.2 MMOL/L (ref 3.4–5.3)
POTASSIUM SERPL-SCNC: 4.2 MMOL/L (ref 3.4–5.3)
PROT SERPL-MCNC: 6.2 G/DL (ref 6.8–8.8)
PROT SERPL-MCNC: 6.5 G/DL (ref 6.8–8.8)
PROT SERPL-MCNC: 6.5 G/DL (ref 6.8–8.8)
PROT SERPL-MCNC: 6.8 G/DL (ref 6.8–8.8)
RBC # BLD AUTO: 3.84 10E12/L (ref 4.4–5.9)
RBC # BLD AUTO: 3.99 10E12/L (ref 4.4–5.9)
SODIUM SERPL-SCNC: 135 MMOL/L (ref 133–144)
SODIUM SERPL-SCNC: 135 MMOL/L (ref 133–144)
SODIUM SERPL-SCNC: 136 MMOL/L (ref 133–144)
SODIUM SERPL-SCNC: 139 MMOL/L (ref 133–144)
SPECIMEN SOURCE: ABNORMAL
SPECIMEN SOURCE: NORMAL
TARGETS BLD QL SMEAR: ABNORMAL
WBC # BLD AUTO: 11.4 10E9/L (ref 4–11)
WBC # BLD AUTO: 11.6 10E9/L (ref 4–11)

## 2021-02-19 PROCEDURE — 272N000473 HC KIT, VPS RHYTHM STYLET

## 2021-02-19 PROCEDURE — 99233 SBSQ HOSP IP/OBS HIGH 50: CPT | Performed by: INTERNAL MEDICINE

## 2021-02-19 PROCEDURE — 80053 COMPREHEN METABOLIC PANEL: CPT | Performed by: STUDENT IN AN ORGANIZED HEALTH CARE EDUCATION/TRAINING PROGRAM

## 2021-02-19 PROCEDURE — 250N000009 HC RX 250: Performed by: STUDENT IN AN ORGANIZED HEALTH CARE EDUCATION/TRAINING PROGRAM

## 2021-02-19 PROCEDURE — 258N000003 HC RX IP 258 OP 636: Performed by: STUDENT IN AN ORGANIZED HEALTH CARE EDUCATION/TRAINING PROGRAM

## 2021-02-19 PROCEDURE — 85610 PROTHROMBIN TIME: CPT | Performed by: STUDENT IN AN ORGANIZED HEALTH CARE EDUCATION/TRAINING PROGRAM

## 2021-02-19 PROCEDURE — 83605 ASSAY OF LACTIC ACID: CPT | Performed by: STUDENT IN AN ORGANIZED HEALTH CARE EDUCATION/TRAINING PROGRAM

## 2021-02-19 PROCEDURE — 83735 ASSAY OF MAGNESIUM: CPT | Performed by: STUDENT IN AN ORGANIZED HEALTH CARE EDUCATION/TRAINING PROGRAM

## 2021-02-19 PROCEDURE — 94003 VENT MGMT INPAT SUBQ DAY: CPT

## 2021-02-19 PROCEDURE — 82803 BLOOD GASES ANY COMBINATION: CPT | Performed by: STUDENT IN AN ORGANIZED HEALTH CARE EDUCATION/TRAINING PROGRAM

## 2021-02-19 PROCEDURE — 258N000003 HC RX IP 258 OP 636: Performed by: INTERNAL MEDICINE

## 2021-02-19 PROCEDURE — 999N001017 HC STATISTIC GLUCOSE BY METER IP

## 2021-02-19 PROCEDURE — 272N000458 ZZ HC KIT, 5 FR DL BIOFLO OPEN ENDED PICC

## 2021-02-19 PROCEDURE — 999N000065 XR CHEST PORT 1 VW

## 2021-02-19 PROCEDURE — 250N000011 HC RX IP 250 OP 636: Performed by: INTERNAL MEDICINE

## 2021-02-19 PROCEDURE — 93750 INTERROGATION VAD IN PERSON: CPT | Performed by: INTERNAL MEDICINE

## 2021-02-19 PROCEDURE — 99291 CRITICAL CARE FIRST HOUR: CPT | Mod: 25 | Performed by: INTERNAL MEDICINE

## 2021-02-19 PROCEDURE — 250N000013 HC RX MED GY IP 250 OP 250 PS 637: Performed by: STUDENT IN AN ORGANIZED HEALTH CARE EDUCATION/TRAINING PROGRAM

## 2021-02-19 PROCEDURE — 999N000155 HC STATISTIC RAPCV CVP MONITORING

## 2021-02-19 PROCEDURE — 85025 COMPLETE CBC W/AUTO DIFF WBC: CPT | Performed by: STUDENT IN AN ORGANIZED HEALTH CARE EDUCATION/TRAINING PROGRAM

## 2021-02-19 PROCEDURE — 250N000009 HC RX 250: Performed by: INTERNAL MEDICINE

## 2021-02-19 PROCEDURE — 85260 CLOT FACTOR X STUART-POWER: CPT | Performed by: STUDENT IN AN ORGANIZED HEALTH CARE EDUCATION/TRAINING PROGRAM

## 2021-02-19 PROCEDURE — 999N000157 HC STATISTIC RCP TIME EA 10 MIN

## 2021-02-19 PROCEDURE — 250N000011 HC RX IP 250 OP 636

## 2021-02-19 PROCEDURE — 97165 OT EVAL LOW COMPLEX 30 MIN: CPT | Mod: GO

## 2021-02-19 PROCEDURE — 90947 DIALYSIS REPEATED EVAL: CPT

## 2021-02-19 PROCEDURE — 999N000045 HC STATISTIC DAILY SWAN MONITORING

## 2021-02-19 PROCEDURE — 272N000078 HC NUTRITION PRODUCT INTERMEDIATE LITER

## 2021-02-19 PROCEDURE — 36569 INSJ PICC 5 YR+ W/O IMAGING: CPT

## 2021-02-19 PROCEDURE — 82330 ASSAY OF CALCIUM: CPT | Performed by: STUDENT IN AN ORGANIZED HEALTH CARE EDUCATION/TRAINING PROGRAM

## 2021-02-19 PROCEDURE — 82330 ASSAY OF CALCIUM: CPT | Performed by: INTERNAL MEDICINE

## 2021-02-19 PROCEDURE — 83615 LACTATE (LD) (LDH) ENZYME: CPT | Performed by: STUDENT IN AN ORGANIZED HEALTH CARE EDUCATION/TRAINING PROGRAM

## 2021-02-19 PROCEDURE — 84100 ASSAY OF PHOSPHORUS: CPT | Performed by: STUDENT IN AN ORGANIZED HEALTH CARE EDUCATION/TRAINING PROGRAM

## 2021-02-19 PROCEDURE — 71045 X-RAY EXAM CHEST 1 VIEW: CPT | Mod: 26 | Performed by: RADIOLOGY

## 2021-02-19 PROCEDURE — 97530 THERAPEUTIC ACTIVITIES: CPT | Mod: GO

## 2021-02-19 PROCEDURE — 999N000015 HC STATISTIC ARTERIAL MONITORING DAILY

## 2021-02-19 PROCEDURE — 85027 COMPLETE CBC AUTOMATED: CPT | Performed by: INTERNAL MEDICINE

## 2021-02-19 PROCEDURE — 250N000011 HC RX IP 250 OP 636: Performed by: STUDENT IN AN ORGANIZED HEALTH CARE EDUCATION/TRAINING PROGRAM

## 2021-02-19 PROCEDURE — 99233 SBSQ HOSP IP/OBS HIGH 50: CPT | Mod: GC | Performed by: INTERNAL MEDICINE

## 2021-02-19 PROCEDURE — 82805 BLOOD GASES W/O2 SATURATION: CPT | Performed by: STUDENT IN AN ORGANIZED HEALTH CARE EDUCATION/TRAINING PROGRAM

## 2021-02-19 PROCEDURE — 272N000201 ZZ HC ADHESIVE SKIN CLOSURE, DERMABOND

## 2021-02-19 PROCEDURE — 250N000013 HC RX MED GY IP 250 OP 250 PS 637: Performed by: INTERNAL MEDICINE

## 2021-02-19 PROCEDURE — 85730 THROMBOPLASTIN TIME PARTIAL: CPT | Performed by: STUDENT IN AN ORGANIZED HEALTH CARE EDUCATION/TRAINING PROGRAM

## 2021-02-19 PROCEDURE — 71045 X-RAY EXAM CHEST 1 VIEW: CPT

## 2021-02-19 PROCEDURE — 83735 ASSAY OF MAGNESIUM: CPT | Performed by: INTERNAL MEDICINE

## 2021-02-19 PROCEDURE — 200N000002 HC R&B ICU UMMC

## 2021-02-19 PROCEDURE — 80053 COMPREHEN METABOLIC PANEL: CPT | Performed by: INTERNAL MEDICINE

## 2021-02-19 PROCEDURE — 97110 THERAPEUTIC EXERCISES: CPT | Mod: GO

## 2021-02-19 PROCEDURE — 84100 ASSAY OF PHOSPHORUS: CPT | Performed by: INTERNAL MEDICINE

## 2021-02-19 RX ORDER — LIDOCAINE 40 MG/G
CREAM TOPICAL
Status: DISCONTINUED | OUTPATIENT
Start: 2021-02-19 | End: 2021-03-17 | Stop reason: HOSPADM

## 2021-02-19 RX ORDER — HEPARIN SODIUM,PORCINE 10 UNIT/ML
2-5 VIAL (ML) INTRAVENOUS
Status: DISCONTINUED | OUTPATIENT
Start: 2021-02-19 | End: 2021-02-19

## 2021-02-19 RX ORDER — LIDOCAINE 40 MG/G
CREAM TOPICAL
Status: DISCONTINUED | OUTPATIENT
Start: 2021-02-19 | End: 2021-02-19

## 2021-02-19 RX ADMIN — CEFAZOLIN SODIUM 2 G: 2 INJECTION, SOLUTION INTRAVENOUS at 07:32

## 2021-02-19 RX ADMIN — ALLOPURINOL 200 MG: 100 TABLET ORAL at 07:27

## 2021-02-19 RX ADMIN — CALCIUM CHLORIDE, MAGNESIUM CHLORIDE, SODIUM CHLORIDE, SODIUM BICARBONATE, POTASSIUM CHLORIDE AND SODIUM PHOSPHATE DIBASIC DIHYDRATE 14 ML/KG/HR: 3.68; 3.05; 6.34; 3.09; .314; .187 INJECTION INTRAVENOUS at 20:30

## 2021-02-19 RX ADMIN — CALCIUM CHLORIDE, MAGNESIUM CHLORIDE, SODIUM CHLORIDE, SODIUM BICARBONATE, POTASSIUM CHLORIDE AND SODIUM PHOSPHATE DIBASIC DIHYDRATE 14 ML/KG/HR: 3.68; 3.05; 6.34; 3.09; .314; .187 INJECTION INTRAVENOUS at 08:47

## 2021-02-19 RX ADMIN — Medication 1 PACKET: at 16:38

## 2021-02-19 RX ADMIN — CALCIUM CHLORIDE, MAGNESIUM CHLORIDE, SODIUM CHLORIDE, SODIUM BICARBONATE, POTASSIUM CHLORIDE AND SODIUM PHOSPHATE DIBASIC DIHYDRATE 12.5 ML/KG/HR: 3.68; 3.05; 6.34; 3.09; .314; .187 INJECTION INTRAVENOUS at 04:59

## 2021-02-19 RX ADMIN — FINASTERIDE 5 MG: 5 TABLET, FILM COATED ORAL at 07:28

## 2021-02-19 RX ADMIN — CALCIUM CHLORIDE, MAGNESIUM CHLORIDE, SODIUM CHLORIDE, SODIUM BICARBONATE, POTASSIUM CHLORIDE AND SODIUM PHOSPHATE DIBASIC DIHYDRATE 12.5 ML/KG/HR: 3.68; 3.05; 6.34; 3.09; .314; .187 INJECTION INTRAVENOUS at 01:01

## 2021-02-19 RX ADMIN — CEFAZOLIN SODIUM 2 G: 2 INJECTION, SOLUTION INTRAVENOUS at 20:14

## 2021-02-19 RX ADMIN — HYDRALAZINE HYDROCHLORIDE 10 MG: 20 INJECTION INTRAMUSCULAR; INTRAVENOUS at 14:36

## 2021-02-19 RX ADMIN — HUMAN INSULIN 0.5 UNITS/HR: 100 INJECTION, SOLUTION SUBCUTANEOUS at 04:10

## 2021-02-19 RX ADMIN — ASPIRIN 81 MG CHEWABLE TABLET 81 MG: 81 TABLET CHEWABLE at 07:27

## 2021-02-19 RX ADMIN — DOBUTAMINE HYDROCHLORIDE 3 MCG/KG/MIN: 200 INJECTION INTRAVENOUS at 18:14

## 2021-02-19 RX ADMIN — Medication 1 PACKET: at 07:29

## 2021-02-19 RX ADMIN — DOCUSATE SODIUM 50 MG AND SENNOSIDES 8.6 MG 2 TABLET: 8.6; 5 TABLET, FILM COATED ORAL at 20:38

## 2021-02-19 RX ADMIN — AZITHROMYCIN MONOHYDRATE 500 MG: 500 INJECTION, POWDER, LYOPHILIZED, FOR SOLUTION INTRAVENOUS at 07:32

## 2021-02-19 RX ADMIN — FLUOXETINE HYDROCHLORIDE 30 MG: 20 SOLUTION ORAL at 07:28

## 2021-02-19 RX ADMIN — Medication 1 PACKET: at 20:14

## 2021-02-19 RX ADMIN — CALCIUM CHLORIDE, MAGNESIUM CHLORIDE, SODIUM CHLORIDE, SODIUM BICARBONATE, POTASSIUM CHLORIDE AND SODIUM PHOSPHATE DIBASIC DIHYDRATE 14 ML/KG/HR: 3.68; 3.05; 6.34; 3.09; .314; .187 INJECTION INTRAVENOUS at 17:20

## 2021-02-19 RX ADMIN — Medication 5 ML: at 07:28

## 2021-02-19 RX ADMIN — Medication 1 PACKET: at 13:56

## 2021-02-19 RX ADMIN — CALCIUM CHLORIDE, MAGNESIUM CHLORIDE, SODIUM CHLORIDE, SODIUM BICARBONATE, POTASSIUM CHLORIDE AND SODIUM PHOSPHATE DIBASIC DIHYDRATE 12.5 ML/KG/HR: 3.68; 3.05; 6.34; 3.09; .314; .187 INJECTION INTRAVENOUS at 21:30

## 2021-02-19 RX ADMIN — BIVALIRUDIN 0.02 MG/KG/HR: 250 INJECTION INTRACAVERNOUS at 21:05

## 2021-02-19 RX ADMIN — CALCIUM CHLORIDE, MAGNESIUM CHLORIDE, SODIUM CHLORIDE, SODIUM BICARBONATE, POTASSIUM CHLORIDE AND SODIUM PHOSPHATE DIBASIC DIHYDRATE 12.5 ML/KG/HR: 3.68; 3.05; 6.34; 3.09; .314; .187 INJECTION INTRAVENOUS at 13:26

## 2021-02-19 RX ADMIN — CALCIUM CHLORIDE, MAGNESIUM CHLORIDE, SODIUM CHLORIDE, SODIUM BICARBONATE, POTASSIUM CHLORIDE AND SODIUM PHOSPHATE DIBASIC DIHYDRATE 14 ML/KG/HR: 3.68; 3.05; 6.34; 3.09; .314; .187 INJECTION INTRAVENOUS at 01:01

## 2021-02-19 RX ADMIN — OMEPRAZOLE 20 MG: KIT at 07:28

## 2021-02-19 RX ADMIN — DOCUSATE SODIUM 50 MG AND SENNOSIDES 8.6 MG 2 TABLET: 8.6; 5 TABLET, FILM COATED ORAL at 07:27

## 2021-02-19 RX ADMIN — CALCIUM CHLORIDE, MAGNESIUM CHLORIDE, SODIUM CHLORIDE, SODIUM BICARBONATE, POTASSIUM CHLORIDE AND SODIUM PHOSPHATE DIBASIC DIHYDRATE 12.5 ML/KG/HR: 3.68; 3.05; 6.34; 3.09; .314; .187 INJECTION INTRAVENOUS at 16:27

## 2021-02-19 RX ADMIN — Medication 1 MG: at 07:28

## 2021-02-19 RX ADMIN — CALCIUM CHLORIDE 1 G: 100 INJECTION, SOLUTION INTRAVENOUS at 10:35

## 2021-02-19 RX ADMIN — CALCIUM CHLORIDE, MAGNESIUM CHLORIDE, SODIUM CHLORIDE, SODIUM BICARBONATE, POTASSIUM CHLORIDE AND SODIUM PHOSPHATE DIBASIC DIHYDRATE 12.5 ML/KG/HR: 3.68; 3.05; 6.34; 3.09; .314; .187 INJECTION INTRAVENOUS at 05:00

## 2021-02-19 RX ADMIN — CALCIUM CHLORIDE, MAGNESIUM CHLORIDE, SODIUM CHLORIDE, SODIUM BICARBONATE, POTASSIUM CHLORIDE AND SODIUM PHOSPHATE DIBASIC DIHYDRATE 14 ML/KG/HR: 3.68; 3.05; 6.34; 3.09; .314; .187 INJECTION INTRAVENOUS at 23:50

## 2021-02-19 RX ADMIN — CALCIUM CHLORIDE, MAGNESIUM CHLORIDE, SODIUM CHLORIDE, SODIUM BICARBONATE, POTASSIUM CHLORIDE AND SODIUM PHOSPHATE DIBASIC DIHYDRATE 12.5 ML/KG/HR: 3.68; 3.05; 6.34; 3.09; .314; .187 INJECTION INTRAVENOUS at 08:47

## 2021-02-19 RX ADMIN — CALCIUM CHLORIDE, MAGNESIUM CHLORIDE, SODIUM CHLORIDE, SODIUM BICARBONATE, POTASSIUM CHLORIDE AND SODIUM PHOSPHATE DIBASIC DIHYDRATE: 3.68; 3.05; 6.34; 3.09; .314; .187 INJECTION INTRAVENOUS at 05:30

## 2021-02-19 RX ADMIN — CALCIUM CHLORIDE, MAGNESIUM CHLORIDE, SODIUM CHLORIDE, SODIUM BICARBONATE, POTASSIUM CHLORIDE AND SODIUM PHOSPHATE DIBASIC DIHYDRATE 14 ML/KG/HR: 3.68; 3.05; 6.34; 3.09; .314; .187 INJECTION INTRAVENOUS at 12:41

## 2021-02-19 ASSESSMENT — ACTIVITIES OF DAILY LIVING (ADL)
ADLS_ACUITY_SCORE: 16
ADLS_ACUITY_SCORE: 18
ADLS_ACUITY_SCORE: 16
ADLS_ACUITY_SCORE: 16

## 2021-02-19 NOTE — PLAN OF CARE
Major Shift Events:   -Patient remained intubated over shift due to lethargy. Will attempt to extubate tomorrow if more awake. Leave off sedation.  -Tolerating CPAP since 8am. Ok to switch back to regular vent setting overnight for rest.  -LVAD at 4.9lpm, 5500 rpm, PI 2-6  -CRRT at goal (I=O)    Plan: Continue to monitor  For vital signs and complete assessments, please see documentation flowsheets.

## 2021-02-19 NOTE — PLAN OF CARE
Cardiac: ST RBBB, CVP 13.  Dobutamine gtt infusing, see MAR/doc flow sheet. LVAD Flow 4.2-4.9, Speed 5500, PI 1.6-6.5, Power 4.2-4.4. No alarms noted during shift. Angiomax gtt infusing.   Resp: CMV 50%/20/450/5.   Neuro: Follows commands, but drowsy.   :Ongoing CRRT, able to achieve goal of I = O. 2 failed self test alarms overnight for effluent pods, resolved after cleaning pod and connection site. Straight cath for 100 ml this morning.   GI: Tube feeding at goal.  Skin:  Intact. Driveline scant amount of yellow dried drainage, no redness or swelling at site.   Endocrine: Insulin gtt restarted overnight.      Interventions: 1 dose of IV hydralazine given MAP goal < 85 per Dr. Cisneros.     Plan: P/S trail today and possible extubation.

## 2021-02-19 NOTE — PLAN OF CARE
Pt intubated, opens eyes to voice, and follows commands. Pt PS 10/5  for 3hr today and tolerated well. Pt currently on cmv 40%fi02 20RR 450VT P5. Pt on TF w/ rate @ 60ml/hr and tolerating well. Bladder scan of 63ml. Pt remains on CRRT w/ goal of 0-20 ml/hr. Tremors occurs less than on previous days, MAP still drops into the 50s whenever pt have tremors or is being suctioned requiring levo to bring MAP >65.

## 2021-02-19 NOTE — PROGRESS NOTES
[facility]    Cardiology Progress Note- Cards 2        Date of Admission:  2/15/2021     Today's Plan  - monitor off sedation  - Continue broad spectrum antibiotics     -Cefazolin (2/18/21 -     )   -Azithromycin (2/15/21 -    )    Cefepime (2/16/21 - 2/18/21) (deescalated to cefazolin)    Vancomycin (2/15/21 - 2/18/21) (MRSA nares negative)    Piperacillin tazobactam (2/15/21-2/16/21) (switched for renal protection)  - continue dobutamine 3  - continue bival  - continue CRRT - goal net even  - work towards extubation pending mental status improvement    Assessment & Plan: S      Eliseo Tanner is a 65 year old male with PMHx relevant for NICM with LVEF 15-20%, NYHA III s/p HM III 2/18/2020 (post op complicated by retrosternal hematoma with bleeding in the lungs), s/p ICD placed on 3/16/2020, h/o MSSA driveline infection and bacteremia 11/5/2020 on prophylactic cephalexin, h/o VT on amiodarone, moderate nonobstructive CAD, severe MR, atrial fibrillation on warfarin, hypertension, ABHINAV requiring CPAP, CKD IV, DMII, HLP,  h/o HIT who presented to the Cardiovascular Unit (4E Cardiac ICU) with mixed cardiogenic and distributive shock withan intermittent wide complex tachycardia. Stabilized on low dose dobutamine off of pressors. Minimal oxygen requirements with persistent acute renal failure. Mental status improving but not sufficient for extubation.     Cardiovascular:    #Mixed Cardiogenic and Septic Shock  #Multiorgan Failure   Presented from [facility] with subacute development of shortness of breath, dyspnea on exertion, dizziness/lightheadedness with near syncopal event found to be febrile with intermittent wide complex tachycardia. Recent COVID19 moderna vaccination. CT chest showing bilateral infiltrates. Community acquired pneumonia vs. Possible recurrent of MSSA bacteremia WBC 24.5, Procal 7.95, , Lactacte 6.9.    Mildly  elevated filling pressures on presentation CVP 18, PA 35/20, PCWP 11. Likely some component of cardiogenic shock. LVAD parameters relatively stable despite markedly elevated LDH 8,531. Euvolemic on exam. Worsening renal function, Cr 4.62, up-trending LFT's/ INR. Legionella antigen positive. Weaned off of vasopressor and maintained on dobutamine. Acute hepatic injury improving and now requiring dialysis. Shock appears to be resolved.     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 Bhatti (ID clinic)   # Troponin elevation (likely demand ischemia)  # Essential Hypertension  # Hyperlipidemia        Patient with long standing history of NICM previously implanted LVAD (HM III) on 02/18/20 due to worsening functional status and systolic ventricular dysfunction (further complicated by RV dysfunction by VAD hemodynamic compensatory mechanism, VT in ICU now on Amiodarone and Atrial Fibrillation with AVR requiring DCCV on 02/28/20).      Elevated cardiac biomarkers on presentation, SGC with only mildly elevated filling pressures. Euvolemic on exam. Troponin elevation likely related to demand ischemia with no concerns for ACS. Most recent VAD interrogation without any significant Flow-Alarms    LDH 8,531. Initial concern for LVAD thrombus. However given relatively stable LVAD parameters, degree of LDH elevation is out of proportion. Will continue to monitor for LVAD alarms.      - continue dobutamine  - Held ACE-I/ARB/ARNi: Lisinopril; due to worsening BERNARDA  - No BB; deferred 2/2 shock  - Aldosterone antagonist: deferred while other medical  therapy is optimized  - SCD prophylaxis: ICD  - Fluid status : euvolemic hypervolemic, maintain net even with CRRT  - MAP Goal: 65-85  - On Warfarin for HM-III INR Goal 2-3, currently bridging with bivalirudin   - Hold Hydralazine 100mg given shock  - Continue ASA 81 mg per oral daily       # Wide complex tachycardia. Atrial Flutter vs Atrial flutter vs Ventricular Tachycardia  # History of Atrial Fibrillation s/p DCCV/history of Atrial Flutter       Wide complex tachycardia HR 140s on presentation in the setting of febrile illness and likely pneumonia. Did not initially respond to adenosine. Concern for VT. Intermittently back into HR 60s with underlying rhythm of atrial flutter 3:1 conduction block. Per EP can not rule out VT, may be dual tachycardias.      - hold Amiodarone gtt  - Ensure K+ >4.0 mEq/L and Mg+2 >2.0  - hold warfarin  - continue bivalirudin  - On cardiac telemetry    Pulmonary  #Hypoxemic Respiratory Failure  #Legionella Pneumonia  Hypoxemic in the setting of mixed cardiogenic septic shock requiring emergent intubation. O2 requirements quickly improved. Currently minimal vent settings  - extubation pending improvement in mental status.     Infectious disease    #Sepsis  #Legionella Pneumonia  #Bilateral pulmonary infiltrates  CT showing bilateral diffuse patchy consolidations concerning for infectious process. Low suspicion for recurrent driveline or possible hardware infection (history of MSSA Chronic Driveline Infection). Urine without pyuria. Urine legionella ag positive.   - f/u blood cultures  - f/u sputum legionella culture  - continue broad spectrum antibiotics  - ID on board, appreciate recs    Renal:  # Acute on Chronic CKD stage IV likely 2/2 Septic Shock   Creatinine continues uptrending 3.23 > 4.62 > 4.9. LIJ Dialysis line. Ongoing CRRT.  - continue CRRT  - Daily Weight's  - Strict I/O's  - Daily BMP's  - Avoid any additional nephrotoxicity      GI  # Shock Liver   # Congestive  Hepatopathy  Hepatoccelular pattern of liver injury  > 3,496, AST 1,441 > 8,490 likely 2/2 to septic shock. INR downtrended s/p 3mg IV vit K. RUQ US without portal vein thrombus.  - Trend LFT's   - continue Tube feeds  - continue bowel regimen     Hematological   # Chronic Microcytic/Hypochromic Anemia (likely related to iron deficiency)  # Coagulopathy related to sepsis   # IgG Kappa monoclonal Gammopathy of undetermined significance     - Monitor Hgb (baseline 8-9g/dL)  - transfuse for Hb < 7.0   - Patient follows with [facility] and [facility] (Dr. Ibarra)     # Previous Concern For HIT  On previous hospitalization for LVAD placement (02/06/20-03/20/20), concern for HIT. 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    At that time, patient underwent two HIT panel tests (first one was negative and second antibody test was positive). No known thrombosis events. DAVINA antibody was negative raising question about validity of diagnosis . Per hematology at the time (Dr. James), no other cause for isolated thrombocytopenia. Immediate recovery of plts following d/c of heparin. 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     - recommend avoiding heparin products  - continue bivalirudin as bridging for LVAD     Endocrine  # Type II DM     > Last Hgb A1C: 6.8 (01/06/21)     - Continue PTA Insulin Basaglar 10 Units Aq at bedtime  - On Medium sliding scale insulin  - Oral Hypoglycemia Protocol      Neurologic:  # Disorientation/Toxic Metabolic Encephalopathy  # Chronic Intracranial Small Vessel Disease        Patient reported some lightheadedness/dizziness and disorientation the days prior to hospitalization while at home. However, no reports of LOC, seizure activity, focal deficits, or findings for acute intracranial pathology on most recent OSH CT head w/o contrast (02/15/21). Now s/p intubation.  Intermittently following commands off of sedation. Mental status slowly improving  - off of sedation  - extubation pending improvement in MS    DVT Prophylaxis: bivalirudin gtt  Guevara Catheter: not present  Code Status: Full Code  Fluids: no IVFs  Lines: RIJ CVC (2/15), L IJ dialysis catheter (2/16), left radial arterial line (2/15)      Disposition Plan   Expected discharge: > 7 days, recommended to transitional care unit once fluid volume status optimized on oral medication, arrhythmia stabilized , therapeutic anticoagulation achieved and safe disposition plan/ TCU bed available.      Entered: Daniel Fleming MD 02/19/2021, 7:39 AM       The patient's care was discussed with the Attending Physician, Dr. Cisneros.    Daniel Fleming MD  [facility]  Please see sign in/sign out for up to date coverage information      Late entry - pt seen and examined on 2/19/21  Critical Care ICU Note - Cardiology  Veronica Judd M.D.    I have seen and examined the patient, have discussed the patient with the heart failure team and I agree with the assessment and plan as outlined below. I have personally reviewed vital signs, hemodynamics, medications, laboratory values, and diagnostic testing.     The patient remains unstable in the ICU with on-going need for ventilator support, parenteral medications for the adjustment of blood pressure and cardiac output and maintenance of renal function.      The patient is seen for prolonged ventilator management requiring oxygen and/or pressure modulation; cardiac and distributive shock requiring vasopressor and or inotropic agents; acute renal failure renal replacement therapy and fluid management to maintain blood pressure and secondary organ function    I personally reviewed:  Arterial and venous blood gases to assess acid base balance, oxygenation, and ventilator settings.    Hemodynamic parameters obtained by central hemodynamic monitoring  including RAP, estimated LVEDP, pulmonary artery pressure, cardiac output and vascular resistances in order to adjust fluids and infused medications for blood pressure and cardiac output maintenance.  Ventilatory settings and/or supplement oxygen needs in order to obtain optimal oxygenation and electrolyte balance at low possible pressure support and inspired oxygen tension.    Pt;s hemodynamics remain acceptable on low-dose dobutamine.  Mental status continues to improve however not enough to allow extubation.  Continue to require renal replacement therapy without current signs of renal recovery.  Liver function test continue to improve.  LVAD was interrogated at bedside, all parameters reviewed. Device is with normal function, all parameters in expected range. Speed remains at 5500RPM, PI 3's and flow is 4.2-4.8LPM, power 4.2-4.3 muhammad.  LVAD is functioning appropriately and there is no evidence for LVAD thrombosis.    Veronica Judd MD  Section Head - Advanced Heart Failure, Transplantation and Mechanical Circulatory Support  Director - Adult [facility]  Associate Professor of Medicine, [facility]    I spent 40 minutes in critical care of the patient including 25 minutes of direct patient care including serial assessments and discussion with the patient and patient's care team.    ______________________________________________________________________    Interval History   Overnight no acute events. Remains intubated. Minimal vent requirements. Off of sedation. Opening eyes and responding to commands. Continued on CRRT  LFTs downtrending. LDH downtrending.     Date CVP PA PCWP CO CI SVR SvO2   21 19 40/24 14 5.2 2.4 910 53%   21 8 34/16 12 4.6 2.15 991 48%                         Heartmate 3 (centrifugal flow) VS  Flow (Lpm): 4.4 Lpm  Pulse Index (PI): 3.6 PI  Speed (rpm): 5500 rpm  Power (muhammad): 4.2 muhammad  Current Hct settin    Data reviewed today:  I reviewed all medications, new labs and imaging results over the last 24 hours.      Physical Exam   Vital Signs: Temp: 98.2  F (36.8  C) Temp src: Esophageal  Pulse: 120   Resp: 20 SpO2: 99 % O2 Device: Mechanical Ventilator    Weight: 213 lbs 13.54 oz    In general, the patient is intubated, somnolent, in no apparent distress.      Neck: RIJ and LIJ catheters in place.   Heart: RRR. S1 and S2 no appreciated. Mechanical whir. No rub, click, or gallop.   Lungs: Bilateral rhonchi and rales   GI: Soft, nontender, nondistended.   Extremities: trace edema.  The pulses are 2+at the radial and DP bilaterally.  Neuro: grossly non focal, opening eyes and responding to commands  Skin: no rashes.    Data   Recent Labs   Lab 02/19/21  0349 02/18/21  1948 02/18/21  1052 02/18/21  0401 02/17/21  2148 02/15/21  1910 02/15/21  1910   WBC 11.6*  --   --  13.7* 13.8*   < > 24.5*   HGB 9.1*  --   --  9.0* 8.7*   < > 9.9*   MCV 73*  --   --  73* 73*   < > 74*     --   --  267 281   < > 356   INR 1.98*  --   --  2.46* 2.67*   < > 2.13*    135 137 136 137   < > 129*   POTASSIUM 4.2 4.0 4.4 4.4 4.6   < > 5.1   CHLORIDE 105 107 106 106 106   < > 97   CO2 23 23 23 24 23   < > 16*   BUN 37* 37* 37* 43* 44*   < > 78*   CR 2.05* 2.16* 2.34* 2.49* 2.71*   < > 3.23*   ANIONGAP 7 5 8 6 8   < > 17*   CALOS 8.1* 8.5 8.3* 8.0* 8.4*   < > 8.7   * 144* 112* 113* 99   < > 215*   ALBUMIN 2.2* 2.1* 2.4* 2.2* 2.4*   < > 3.0*   PROTTOTAL 6.5* 6.3* 6.8 6.6* 6.7*   < > 8.2   BILITOTAL 1.2 1.4* 1.6* 1.5* 1.4*   < > 1.0   ALKPHOS 182* 159* 151* 139 130   < > 141   ALT 2,481* 2,794* 3,233* 3,378* 3,720*   < > 768*   AST 2,642* 3,362* 4,170* 4,870* 6,440*   < > 1,441*   TROPI  --   --   --   --   --   --  0.377*    < > = values in this interval not displayed.     TTE 2/15/2021  Interpretation Summary  HM 3 at 5500 RPM  LV size is small, LVIDd = 3.0 cm. Severely reduced left ventricular function.  Doppler of the inflow cannula and outflow  graft are normal.  RV is severely dilated. Severely reduced right ventricular function.  Mild TR is present.  The aortic valve is closed with mild regurgitation.  IVC is dilated and patient is on a ventilator.  No pericardial effusion present.

## 2021-02-19 NOTE — PROGRESS NOTES
02/19/21 1400   Quick Adds   Type of Visit Initial Occupational Therapy Evaluation   Living Environment   People in home spouse;grandchild(arnold)  (14 yr old )   Current Living Arrangements house   Living Environment Comments Pt intubated/sedated, wife left prior to OT arrival. Per chart review, Pt lives with SO and 14yr grand daughter. Pt's grand daughter lives upstairs. Pt with 3 stairs to enter and bed/bathroom on main floor. Pt has stairs to basement but deep freezer and nothing he urgently needs to access is down there.   Self-Care   Usual Activity Tolerance moderate   Current Activity Tolerance poor   Activity/Exercise/Self-Care Comment Per chart review, pt's IND with ADLs/IADLs.    Disability/Function   Hearing Difficulty or Deaf no   Wear Glasses or Blind yes   Vision Management glasses    Concentrating, Remembering or Making Decisions Difficulty no   Difficulty Communicating no   Difficulty Eating/Swallowing no   Walking or Climbing Stairs Difficulty no   Dressing/Bathing Difficulty no   Toileting issues no   Doing Errands Independently Difficulty (such as shopping) no   Fall history within last six months no   Change in Functional Status Since Onset of Current Illness/Injury yes   General Information   Onset of Illness/Injury or Date of Surgery 02/15/21   Referring Physician Pineda Damian MD   Patient/Family Therapy Goal Statement (OT) none stated    Additional Occupational Profile Info/Pertinent History of Current Problem Eliseo Tanner is a 65 year old male with PMHx relevant for NICM with LVEF 15-20%, NYHA III s/p HM III 2/18/2020 (post op complicated by retrosternal hematoma with bleeding in the lungs), s/p ICD placed on 3/16/2020, h/o MSSA driveline infection and bacteremia 11/5/2020 on prophylactic cephalexin, h/o VT on amiodarone, moderate nonobstructive CAD, severe MR, atrial fibrillation on warfarin, hypertension, ABHINAV requiring CPAP, CKD IV, DMII, HLP,  h/o HIT who presented to  the Cardiovascular Unit ( Cardiac ICU) with mixed cardiogenic and distributive shock withan intermittent wide complex tachycardia.   Cognitive Status Examination   Orientation Status not oriented to person, place or time   Affect/Mental Status (Cognitive) confused;low arousal/lethargic   Follows Commands follows one-step commands;0-24% accuracy   Cognitive Status Comments Pt intubated and off sedation, pt waxing/waning mentation. Pt unable to maintain arousal >5 seconds.    Visual Perception   Visual Impairment/Limitations unable/difficult to assess   Sensory   Sensory Comments Unable to asses   Pain Assessment   Patient Currently in Pain No   Integumentary/Edema   Integumentary/Edema no deficits were identifed   Posture   Posture Comments Pt with adequate head control with dependent transfer to recliner, pt with poor trunk control seated in recliner needing wedge pillows to maintain neutral seated position.    Range of Motion Comprehensive   General Range of Motion no range of motion deficits identified   Strength Comprehensive (MMT)   General Manual Muscle Testing (MMT) Assessment upper extremity strength deficits identified   Coordination   Coordination Comments Pt with UE/LE jerking intermittently.    Bed Mobility   Comment (Bed Mobility) Pt total A for rolling bilaterally    Transfers   Transfer Comments Pt dependently lifted to recliner with chair with Ax3    Clinical Impression   Criteria for Skilled Therapeutic Interventions Met (OT) yes   OT Diagnosis decreased ADL I    OT Problem List-Impairments impacting ADL activity tolerance impaired;problems related to;balance;cognition;strength;postural control   Assessment of Occupational Performance 5 or more Performance Deficits   Identified Performance Deficits dressing, bathing, g/h tasks, home management, mobility   Planned Therapy Interventions (OT) IADL retraining;ADL retraining;balance training;bed mobility training;cognition;strengthening;transfer  training;home program guidelines;progressive activity/exercise;risk factor education   Clinical Decision Making Complexity (OT) low complexity   Therapy Frequency (OT) 3x/week   Predicted Duration of Therapy 3 weeks    Risk & Benefits of therapy have been explained evaluation/treatment results reviewed;care plan/treatment goals reviewed;risks/benefits reviewed;current/potential barriers reviewed;patient;participants included   Comment-Clinical Impression Pt presents with the above stated deficits leading to decreased ADL I. Pt to benefit from continued OT intervention to facilitate return to PLOF.    OT Discharge Planning    OT Discharge Recommendation (DC Rec) Transitional Care Facility   OT Rationale for DC Rec Pt presents well below baseline in ADLs, recommend continued OT intervention to facilitate return to PLOF.    OT Brief overview of current status  OH lift    Total Evaluation Time (Minutes)   Total Evaluation Time (Minutes) 5

## 2021-02-19 NOTE — PROGRESS NOTES
[facility] General ID Service: Follow Up Note      Patient:  Eliseo Tanner   Date of birth 1955, Medical record number [id number]  Date of Visit:  02/19/2021  Date of Admission: 2/15/2021         Assessment and Recommendations:   ID Problem List:  1. Mixed septic + cardiogenic shock with severe RV failure  2. Severe Legionella pneumophilia pneumonia (serogroup 1)  3. MSSA drive line infection  4. Oliguric BERNARDA on CKD requiring CRRT  5. Transaminitis  6. Leukocytosis  7. 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 History of NICM s/p HM III (2/18/20), ICD placement (3/16/20)  9. Received 1st dose of the Moderna covid 19 vaccine on 2/11, scheduled for 2nd dose on 3/11/21 at home pharmacy     Dr. Bhatti will be on call for General ID 2/20-2/21 please page Dr. Bhatti with questions over the weekend.      Recommendations:  1. Continue azithromycin 500mg IV daily (today is day #4/10)  2. Start Cefazolin 2g IV q12hrs for MSSA  3. Blood and legionella cultures pending; Legionella PCR pending, HSV and VZV pending  4. 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 CXR and CT chest was significant for bilateral consolidative opacities-right worse then left. Urine legionella antigen was positive for pneumophilia serogroup 1.  COVID 19, RVP and influenza testing negative. Supporting evidence for legionella pneumonia in this circumstance includes CT findings, hyponatremia, transaminitis, CRP elevation and positive urine antigen.  Underlying risk factor  "for severity of disease is age, underlying cardiovascular and renal disease. Marked transaminitis on arrival, now down-trending. Would check HSV and VZV PCR from blood as they may cause LFT elevations. Light growth of E.faecium on sputum culture, likely colonizing as he is improving without treating and has risk factors for colonization.     The driveline infection overall seems stable based on imaging and that the wife noted improved driveline drainage after increasing cephalexin dose ~ 1 week ago. No evidence of bacteremia. While in the hospital will keep on IV antibiotics to get burden of infection down.      Recs were discussed with primary team today. Don't hesitate to call with questions.     Attestation:  I have reviewed today's vital signs, medications, labs and imaging.  Anaid Redding PA-C, Pager # [phone]            Interval History:       Afebrile. On pressure support trial at time of visit, has been drowsy this morning. No acute changes. Remains on dobutamine gtt, rate unchanged. Cr improving.         Review of Systems:   Unable to obtain, patient drowsy and intubated in ICU.          Current Antimicrobials   Current:  - Azithromycin (start 2/16)  - Cefazolin (start 2/18)    Prior:  - Cefepime (2/16-2/18)  - Zosyn (2/15-2/16)  - Vancomycin (2/15-2/17)        History of Present Illness:   Per initial ID consult on 2/16/21:  \"Patient is known to me from the outpatient setting. I last had a telephone visit with him on 2/4 for a ongoing drive line infection. At this time I increased his suppression cephalexin from 500 mg po q 12 hours to 500 mg po q 6 hours. On 2/9 he was seen by VAD coordinator and PA for a device check. Noted to have some drainage from the drive line exit site. Culture revealed MSSA. 2/8 CT a/p revealed stranding along the drive line in the subcutaneous tissue. No discrete fluid collections. Interesting, during this visit the patient felt very good.   Drainage from driveline improved after " "dose increase of cephalexin.      Received 1st dose of the Moderna covid 19 vaccine on 2/11. 2/12-2/14 noted to have general malaise, decreased appetite. During this time his wife took temp-~99. Noted he had a dry cough but it was not frequent. Patient did not complain of nausea, vomiting or diarrhea.      On the morning of 2/15 he fell walking in the bathroom. Initially went to [facility] and then transferred to [facility]. At [facility] he received a dose of azithromycin and ceftriaxone.  On admission noted to be febrile with a leukocytosis and lactic acidosis. After transfer started on empiric pip/tazo and vancomycin. CT chest revealed diffuse bilateral patchy groundglass consolidations (right worse then left), LAD, diffuse mesenteric edema and ascites. Overnight patient decompensated requiring intubation and initiation of CRRT. Urine legionella positive for pneumophilia serogroup 1 antigen. Influenza, RVP and covid 19 pcr negative.\"         Physical Exam:   Ranges for vital signs:  Temp:  [96.8  F (36  C)-99  F (37.2  C)] 96.8  F (36  C)  Pulse:  [] 92  Resp:  [19-30] 21  MAP:  [59 mmHg-97 mmHg] 72 mmHg  Arterial Line BP: ()/(10-89) 78/60  FiO2 (%):  [40 %] 40 %  SpO2:  [95 %-100 %] 100 %    Intake/Output Summary (Last 24 hours) at 2/17/2021 0847  Last data filed at 2/17/2021 0800  Gross per 24 hour   Intake 1715 ml   Output 3046 ml   Net -1331 ml     Exam:  GENERAL:  Drowsy, opens eyes to voice. Intubated in ICU.   ENT:  Head is normocephalic, atraumatic.  Oropharynx is moist, ETT in place  EYES:  Eyes have anicteric sclerae.    LUNGS:  +mechanical vent sounds, coarse bases bilat.  CARDIOVASCULAR:  +LVAD hum.  ABDOMEN:  Soft, non-distended, +bowel sounds.  EXT: Extremities warm and without edema.  SKIN:  No acute rashes.  Bilat internal jugular lines in place without surrounding erythema or purulence.         Laboratory Data:   Reviewed.  Pertinent for:    Culture data:  Microbiology:  Culture " Micro   Date Value Ref Range Status   02/17/2021 No growth after 2 days  Preliminary   02/17/2021 No growth after 2 days  Preliminary   02/16/2021 Light growth  Enterococcus faecium   (A)  Final   02/16/2021 Culture negative monitoring continues  Preliminary   02/15/2021 Moderate growth  Staphylococcus aureus   (A)  Final   02/15/2021 Moderate growth  Strain 2  Staphylococcus aureus   (A)  Final   02/15/2021 Moderate growth  Strain 3  Staphylococcus aureus   (A)  Final   02/15/2021 Light growth  Normal skin freeman    Final   02/15/2021 (A)  Final    Canceled, Test credited  >10 Squamous epithelial cells/low power field indicates oral contamination. Please   recollect.  Notification of test cancellation was given to  Bethanie Murillo RN on 2.15.21 at 2326. JRT     02/15/2021 No growth after 4 days  Preliminary   02/15/2021 No growth after 4 days  Preliminary   02/08/2021 No growth  Final   02/08/2021 No growth  Final   02/08/2021 Moderate growth  Staphylococcus aureus   (A)  Final   02/08/2021 Moderate growth  Strain 2  Staphylococcus aureus   (A)  Final   02/08/2021 No anaerobes isolated  Final   12/17/2020 No anaerobes isolated  Final   12/17/2020 Light growth  Staphylococcus aureus   (A)  Final   11/17/2020 No growth  Final   11/17/2020 No growth  Final   11/16/2020 No growth  Final   11/16/2020 No growth  Final   11/16/2020 Moderate growth  Staphylococcus aureus   (A)  Final   11/16/2020 Moderate growth  Strain 2  Staphylococcus aureus   (A)  Final   11/15/2020 No growth  Final   11/15/2020 No growth  Final   11/14/2020 (A)  Final    Cultured on the 1st day of incubation:  Staphylococcus aureus     11/14/2020   Final    Critical Value/Significant Value, preliminary result only, called to and read back by  ROSA ACE, KRISTEN 1553 11.15.20 NDP     11/14/2020   Final    (Note)  POSITIVE for STAPHYLOCOCCUS AUREUS and NEGATIVE for the mecA gene  (not MRSA) by [employer] multiplex nucleic acid test. The mecA gene was  not  detected. Final identification and antimicrobial susceptibility  testing will be verified by standard methods.    Specimen tested with Verigene multiplex, gram-positive blood culture  nucleic acid test for the following targets: Staph aureus, Staph  epidermidis, Staph lugdunensis, other Staph species, Enterococcus  faecalis, Enterococcus faecium, Streptococcus species, S. agalactiae,  S. anginosus grp., S. pneumoniae, S. pyogenes, Listeria sp., mecA  (methicillin resistance) and Ernst/B (vancomycin resistance).    Critical Value/Significant Value called to and read back by Sha Fontenot Rn 1835 11.15.20 ND     11/14/2020 (A)  Final    Cultured on the 1st day of incubation:  Staphylococcus aureus  Susceptibility testing done on previous specimen     11/14/2020   Final    Critical Value/Significant Value, preliminary result only, called to and read back by  PATRICIO SHANNON RN 2101 11.15.20 NDP     09/21/2020 No growth  Final   09/21/2020 No growth  Final   03/13/2020 No growth  Final   03/13/2020 No growth  Final   03/03/2020 No growth  Final   03/03/2020 No growth  Final   02/22/2020 No growth  Final   02/22/2020 No growth  Final   02/16/2020 No growth  Final   02/16/2020 No growth  Final   02/15/2020 No growth  Final   02/15/2020 (A)  Final    Cultured on the 2nd day of incubation:  Staphylococcus epidermidis     02/15/2020   Final    Critical Value/Significant Value, preliminary result only, called to and read back by  Cee Benavidez RN on 2.16.20 at 2220.  JRT     02/15/2020   Final    (Note)  POSITIVE for STAPHYLOCOCCUS EPIDERMIDIS and POSITIVE for the mecA  gene (resistant to methicillin) by Verigene multiplex nucleic acid  test. Final identification and antimicrobial susceptibility testing  will be verified by standard methods.    Specimen tested with Verigene multiplex, gram-positive blood culture  nucleic acid test for the following targets: Staph aureus, Staph  epidermidis, Staph lugdunensis, other Staph  species, Enterococcus  faecalis, Enterococcus faecium, Streptococcus species, S. agalactiae,  S. anginosus grp., S. pneumoniae, S. pyogenes, Listeria sp., mecA  (methicillin resistance) and Ernst/B (vancomycin resistance).    Critical Value/Significant Value called to and read back by Cee Benavidez RN, 2.17.20 @ 0207 pt.     02/14/2020 No growth  Final   02/13/2020 (A)  Final    Cultured on the 1st day of incubation:  Proteus mirabilis  Susceptibility testing done on previous specimen     02/13/2020   Final    Critical Value/Significant Value, preliminary result only, called to and read back by  Mima Cabrera RN. @1426. 2.13.20. BS.      02/13/2020 (A)  Final    Cultured on the 1st day of incubation:  Enterococcus faecalis  Susceptibility testing done on previous specimen     02/13/2020   Final    Critical Value/Significant Value, preliminary result only, called to and read back by  Alisson Castillo RN on 2.13.20 at 1728.  JRT     02/13/2020   Final    (Note)  POSITIVE for ENTEROCOCCUS FAECALIS and NEGATIVE for Ernst/vanB genes  by [employer] multiplex nucleic acid test. Final identification and  antimicrobial susceptibility testing will be verified by standard  methods.    Specimen tested with Verigene multiplex, gram-positive blood culture  nucleic acid test for the following targets: Staph aureus, Staph  epidermidis, Staph lugdunensis, other Staph species, Enterococcus  faecalis, Enterococcus faecium, Streptococcus species, S. agalactiae,  S. anginosus grp., S. pneumoniae, S. pyogenes, Listeria sp., mecA  (methicillin resistance) and Ernst/B (vancomycin resistance).    Critical Value/Significant Value called to and read back by MARIA EUGENIA MILLAN RN 2/13/20 2036          02/13/2020 (A)  Final    Cultured on the 1st day of incubation:  Proteus mirabilis     02/13/2020   Final    Critical Value/Significant Value, preliminary result only, called to and read back by  Mima Cabrera RN. @1426. 2.13.20. BS.       02/13/2020 (A)  Final    Cultured on the 1st day of incubation:  Enterococcus faecalis     02/13/2020   Final    Critical Value/Significant Value, preliminary result only, called to and read back by  Alisson Leon RN on 2.13.20 at 1728.  JRT     02/13/2020   Final    (Note)  POSITIVE for PROTEUS SPECIES by Verigene multiplex nucleic acid test.  Final identification and antimicrobial susceptibility testing will be  verified by standard methods.    Specimen tested with Verigene multiplex, gram-negative blood culture  nucleic acid test for the following targets: Acinetobacter sp.,  Citrobacter sp., Enterobacter sp., Proteus sp., E. coli, K.  pneumoniae/oxytoca, P. aeruginosa, and the following resistance  markers: CTXM, KPC, NDM, VIM, IMP and OXA.    Critical Value/Significant Value called to and read back by  Alisson Leon RN @ 1650. 2/13/20. AV     02/13/2020 No growth  Final       Inflammatory Markers    Recent Labs   Lab Test 02/16/21  2219 02/15/21  1910 02/11/21  0838 12/17/20  0756   SED 72* 47*  --   --    .0* 270.0* 8.6 8.0       Hematology Studies    Recent Labs   Lab Test 02/19/21  0349 02/18/21  0401 02/17/21  2148 02/17/21  0832   WBC 11.6* 13.7* 13.8* 15.7*   HGB 9.1* 9.0* 8.7* 8.5*   MCV 73* 73* 73* 72*    267 281 235     Recent Labs   Lab Test 02/19/21  0349 02/18/21  0401 02/17/21  0832 02/17/21  0350   ANEU 10.1* 12.5* 14.6* 17.3*   AEOS 0.4 0.1 0.0 0.0       Metabolic Studies     Recent Labs   Lab Test 02/19/21  0940 02/19/21  0349 02/18/21  1948 02/18/21  1052    135 135 137   POTASSIUM 3.9 4.2 4.0 4.4   CHLORIDE 108 105 107 106   CO2 25 23 23 23   BUN 35* 37* 37* 37*   CR 1.99* 2.05* 2.16* 2.34*   GFRESTIMATED 34* 32* 30* 28*       Hepatic Studies    Recent Labs   Lab Test 02/19/21  0940 02/19/21  0349 02/18/21  1948   BILITOTAL 0.9 1.2 1.4*   ALKPHOS 187* 182* 159*   ALBUMIN 2.0* 2.2* 2.1*   AST 2,144* 2,642* 3,362*   ALT 2,035* 2,481* 2,794*            Imaging:      CXR (2/17/2021)  Findings:   Right internal jugular Garfield-Chucky catheter tip projects over the right  main pulmonary artery. Left internal jugular central venous catheter  tip at the brachiocephalic confluence. Stable left chest wall  implantable cardiac defibrillator and LVAD. Median sternotomy wires.  Endotracheal tube tip at the mid to low thoracic trachea. Enteric tube  sidehole projects over the stomach. Stable enlarged cardiac  silhouette. The pulmonary vasculature is indistinct. Low lung volumes  with streaky perihilar and medial bibasilar opacities. Possible trace  bilateral pleural effusions. No pneumothorax.    CT Chest/abd/pelvis (2/16/21)  IMPRESSION:  1. Overall stable patchy consolidative pulmonary opacities, concerning  for pneumonia.  2. Stable prominent and borderline enlarged mediastinal lymph nodes,  favored to be reactive.  3. Mild increase in small bilateral pleural effusions, greater on the  right, with associated compressive atelectasis.  4. Overall stable LVAD drive line appearance in the superior abdominal  wall without discrete fluid collection or significant inflammation.  5. Mild increase in diffuse intra-abdominal ascites.    CT Chest/abd/pelvis (2/15/21)  IMPRESSION:   1. Diffuse patchy consolidations throughout both lungs concerning for  an acute infectious process. Interval increase in size of multiple  mediastinal lymph nodes measuring up to 10 mm, likely reactive.  2. Small right-sided pleural effusion and trace left-sided pleural  effusion.  3. Postsurgical changes of LVAD placement with mild inflammatory soft  tissue changes about the drive line traversing the subcutaneous fat  and superior right rectus abdominis musculature, concerning for drive  line infection, similar to prior.   4. Diffuse mesenteric edema and small amount of intra-abdominal  ascites, new from prior. Fluid within the abdomen and pelvis measures  low density.  5. Mild diffuse bladder wall thickening, this can  be seen with  cystitis. Recommend clinical correlation.

## 2021-02-19 NOTE — PROCEDURES
[facility]    Double Lumen PICC Placement    Date/Time: 2/19/2021 3:14 PM  Performed by: Tiffany Amato RN  Authorized by: Rudy Goodrich MD   Indications: vascular access    UNIVERSAL PROTOCOL   Site Marked: Yes  Prior Images Obtained and Reviewed:  Yes  Required items: Required blood products, implants, devices and special equipment available    Patient identity confirmed:  Verbally with patient and arm band  NA - No sedation, light sedation, or local anesthesia  Confirmation Checklist:  Patient's identity using two indicators, relevant allergies, procedure was appropriate and matched the consent or emergent situation and correct equipment/implants were available  Time out: Immediately prior to the procedure a time out was called    Universal Protocol: the Joint Commission Universal Protocol was followed    Preparation: Patient was prepped and draped in usual sterile fashion           ANESTHESIA    Anesthesia: Local infiltration  Local Anesthetic:  Lidocaine 1% without epinephrine  Anesthetic Total (mL):  4.5      SEDATION    Patient Sedated: No        Preparation: skin prepped with ChloraPrep  Skin prep agent: skin prep agent completely dried prior to procedure  Sterile barriers: maximum sterile barriers were used: cap, mask, sterile gown, sterile gloves, and large sterile sheet  Hand hygiene: hand hygiene performed prior to central venous catheter insertion  Type of line used: Power PICC  Catheter type: double lumen  Lumen type: non-valved  Catheter size: 5 Fr  Brand: Bard  Lot number: [id number]  Placement method: venipuncture, MST, ultrasound and tip confirmation system  Successful placement: yes  Orientation: right  Location: brachial vein (medial) (0.87 cm vein diameter)  Arm circumference: adults 10 cm  Extremity circumference: 33  Visible catheter length: 2  Total catheter length: 41  Dressing and securement: blood cleaned with CHG, glue, chlorhexidine  disc applied, statlock, sterile dressing applied and site cleaned  Post procedure assessment: free fluid flow and placement verified by x-ray  PROCEDURE   Patient Tolerance:  Patient tolerated the procedure well with no immediate complications

## 2021-02-19 NOTE — PROGRESS NOTES
Nephrology Progress Note  02/19/2021         Assessment & Recommendations:   64 yo M with PMHx  NICM  s/p HM III , VT, severe MR, atrial fibrillation on warfarin, hypertension, CKD IV, DMII, HIT presented to OSH with fevers and cough in the setting of syncopal episode transferred to [facility] for further cares. Hospitalization complicated by Afib with RVR with hypotension and intubation with upgrade of cares to the ICU. Found to be in septic shock 2/2 legionella pneumonia with possible cardiogenic shock. Nephrology was consulted for evaluation and management of anuric BERNARDA    Anuric BERNARDA 2/2 ischemic ATN  Baseline Cr last yr s/p LVED placement was around 1. On admission ~2.3 and doubled within 24 hours with rapid decrease in UOP. UA unimpressive with baseline proteinuria and new granular casts. Initiated on RRT 2/16.   - Continue CRRT with net even UF for target CVP of 10-14  - Avoid nephrotoxins as able  - Renally dose meds    Electrolytes - no acute issue  Acid/base - no acute issue  Anemia - Hb stable    Recommendations were communicated to primary team via note    Seen and discussed with Dr. Colby Bledsoe MD   [phone]    Interval History :   Nursing and provider notes from last 24 hours reviewed.  In the last 24 hours Eliseo Tanner remained anuric on CRRT. Remains on dobutamine. CVP remains 8-13.     Review of Systems:   Unable as pt intubated    Physical Exam:   I/O last 3 completed shifts:  In: 2673.16 [I.V.:1163.16; NG/GT:370]  Out: 2958 [Urine:100; Other:2858]   BP (!) 125/93   Pulse 100   Temp 96.4  F (35.8  C)   Resp 21   Wt 97 kg (213 lb 13.5 oz)   SpO2 100%   BMI 33.49 kg/m       GENERAL APPEARANCE: sedated  EYES:  no scleral icterus, pupils equal  PULM: lungs clear to auscultation bilaterally, equal air movement  CV: LVAD sounds  GI: soft, nontender  INTEGUMENT: no obvious rash on exposed surfaces  NEURO:  sedated  Access CVC    Labs:   All labs reviewed by me  Electrolytes/Renal  -   Recent Labs   Lab Test 02/19/21  0940 02/19/21 0349 02/18/21  1948 02/18/21  1531 02/18/21  0401 02/18/21  0401    135 135  --    < > 136   POTASSIUM 3.9 4.2 4.0  --    < > 4.4   CHLORIDE 108 105 107  --    < > 106   CO2 25 23 23  --    < > 24   BUN 35* 37* 37*  --    < > 43*   CR 1.99* 2.05* 2.16*  --    < > 2.49*   * 179* 144*  --    < > 113*   CALOS 7.5* 8.1* 8.5  --    < > 8.0*   MAG  --  2.7*  --  2.6*  --  2.6*   PHOS  --  3.4  --  3.8  --  4.4    < > = values in this interval not displayed.       CBC -   Recent Labs   Lab Test 02/19/21 0349 02/18/21  0401 02/17/21  2148   WBC 11.6* 13.7* 13.8*   HGB 9.1* 9.0* 8.7*    267 281       LFTs -   Recent Labs   Lab Test 02/19/21  0940 02/19/21 0349 02/18/21 1948   ALKPHOS 187* 182* 159*   BILITOTAL 0.9 1.2 1.4*   ALT 2,035* 2,481* 2,794*   AST 2,144* 2,642* 3,362*   PROTTOTAL 6.2* 6.5* 6.3*   ALBUMIN 2.0* 2.2* 2.1*       Iron Panel -   Recent Labs   Lab Test 11/16/20  0616 02/08/20  0408   IRON 16* 25*   IRONSAT 6* 8*   HEAVENLY 75 189         Imaging:  All imaging studies reviewed by me.     Current Medications:    allopurinol  200 mg Oral or Feeding Tube Daily     aspirin  81 mg Oral Daily     azithromycin  500 mg Intravenous Q24H     B and C vitamin Complex with folic acid  5 mL Per Feeding Tube Daily     ceFAZolin  2 g Intravenous Q12H     doxazosin  1 mg Oral Daily     finasteride  5 mg Oral Daily     FLUoxetine  30 mg Oral Daily     omeprazole  20 mg Oral QAM AC     protein modular  1 packet Per Feeding Tube 4x Daily     senna-docusate  2 tablet Oral BID       [Held by provider] amiodarone       bivalirudin ANTICOAGULANT (ANGIOMAX) 250mg/250mL in 0.9% Sodium Chloride 0.02 mg/kg/hr (02/19/21 1300)     dextrose       dextrose Stopped (02/17/21 1600)     CRRT replacement solution 14 mL/kg/hr (02/19/21 1241)     DOBUTamine 3 mcg/kg/min (02/19/21 1300)     fentaNYL Stopped (02/17/21 1000)     insulin (regular) 1 Units/hr (02/19/21 1300)      midazolam Stopped (02/17/21 1000)     niCARdipine Stopped (02/19/21 0019)     - MEDICATION INSTRUCTIONS -       norepinephrine Stopped (02/18/21 1134)     CRRT replacement solution 200 mL/hr at 02/19/21 0530     CRRT replacement solution 12.5 mL/kg/hr (02/19/21 0847)     vasopressin Stopped (02/17/21 0045)     Anali Bledsoe MD

## 2021-02-19 NOTE — PROGRESS NOTES
CRRT STATUS NOTE    DATA:  Time:  5:22 AM  Pressures WNL:  YES  Filter Status:  WDL    Problems Reported/Alarms Noted: failed self test x2 this shift.  Pods removed and cleaned.  No further alarms     Supplies Present:  YES    ASSESSMENT:  Patient Net Fluid Balance:  Net negative 760 2/18; net positive 142 since midnight; net negative 1290 since admit  Vital Signs:  Vitals reviewed.  Dobutamine at 3 mcg/kg/min IV.  LVAD.  Labs: elevated ALT/AST, INR 1.98  Goals of Therapy:  Net negative  as tolerated    INTERVENTIONS:   Blood warmer. Goals of therapy discussed with bedside RN    PLAN:  Fluid removal per goals of therapy as tolerated.  Monitor filter and change every 72 hours and PRN.  Please notify CRRT resource RN at [phone] with questions and concerns

## 2021-02-20 ENCOUNTER — APPOINTMENT (OUTPATIENT)
Dept: GENERAL RADIOLOGY | Facility: CLINIC | Age: 66
DRG: 870 | End: 2021-02-20
Attending: STUDENT IN AN ORGANIZED HEALTH CARE EDUCATION/TRAINING PROGRAM
Payer: MEDICARE

## 2021-02-20 LAB
ALBUMIN SERPL-MCNC: 2.1 G/DL (ref 3.4–5)
ALP SERPL-CCNC: 192 U/L (ref 40–150)
ALP SERPL-CCNC: 197 U/L (ref 40–150)
ALP SERPL-CCNC: 207 U/L (ref 40–150)
ALT SERPL W P-5'-P-CCNC: 1200 U/L (ref 0–70)
ALT SERPL W P-5'-P-CCNC: 701 U/L (ref 0–70)
ALT SERPL W P-5'-P-CCNC: 888 U/L (ref 0–70)
ANION GAP SERPL CALCULATED.3IONS-SCNC: 1 MMOL/L (ref 3–14)
ANION GAP SERPL CALCULATED.3IONS-SCNC: 5 MMOL/L (ref 3–14)
ANION GAP SERPL CALCULATED.3IONS-SCNC: 5 MMOL/L (ref 3–14)
ANISOCYTOSIS BLD QL SMEAR: SLIGHT
APTT PPP: 57 SEC (ref 22–37)
AST SERPL W P-5'-P-CCNC: 1095 U/L (ref 0–45)
AST SERPL W P-5'-P-CCNC: 1400 U/L (ref 0–45)
AST SERPL W P-5'-P-CCNC: 926 U/L (ref 0–45)
BASE DEFICIT BLDA-SCNC: 0.4 MMOL/L
BASE DEFICIT BLDV-SCNC: 0.1 MMOL/L
BASE DEFICIT BLDV-SCNC: 0.1 MMOL/L
BASE EXCESS BLDA CALC-SCNC: 0 MMOL/L
BASE EXCESS BLDA CALC-SCNC: 0.9 MMOL/L
BASE EXCESS BLDV CALC-SCNC: 0.4 MMOL/L
BASE EXCESS BLDV CALC-SCNC: 1.6 MMOL/L
BASE EXCESS BLDV CALC-SCNC: 2.2 MMOL/L
BASOPHILS # BLD AUTO: 0 10E9/L (ref 0–0.2)
BASOPHILS NFR BLD AUTO: 0 %
BILIRUB SERPL-MCNC: 0.7 MG/DL (ref 0.2–1.3)
BILIRUB SERPL-MCNC: 0.7 MG/DL (ref 0.2–1.3)
BILIRUB SERPL-MCNC: 0.9 MG/DL (ref 0.2–1.3)
BUN SERPL-MCNC: 32 MG/DL (ref 7–30)
CA-I BLD-MCNC: 4.3 MG/DL (ref 4.4–5.2)
CA-I BLD-MCNC: 4.4 MG/DL (ref 4.4–5.2)
CA-I BLD-MCNC: 4.5 MG/DL (ref 4.4–5.2)
CA-I BLD-MCNC: 4.7 MG/DL (ref 4.4–5.2)
CALCIUM SERPL-MCNC: 7.7 MG/DL (ref 8.5–10.1)
CALCIUM SERPL-MCNC: 7.8 MG/DL (ref 8.5–10.1)
CALCIUM SERPL-MCNC: 8.2 MG/DL (ref 8.5–10.1)
CHLORIDE SERPL-SCNC: 106 MMOL/L (ref 94–109)
CHLORIDE SERPL-SCNC: 107 MMOL/L (ref 94–109)
CHLORIDE SERPL-SCNC: 107 MMOL/L (ref 94–109)
CO2 SERPL-SCNC: 26 MMOL/L (ref 20–32)
CO2 SERPL-SCNC: 26 MMOL/L (ref 20–32)
CO2 SERPL-SCNC: 29 MMOL/L (ref 20–32)
CREAT SERPL-MCNC: 1.92 MG/DL (ref 0.66–1.25)
CREAT SERPL-MCNC: 1.95 MG/DL (ref 0.66–1.25)
CREAT SERPL-MCNC: 2 MG/DL (ref 0.66–1.25)
DIFFERENTIAL METHOD BLD: ABNORMAL
EOSINOPHIL # BLD AUTO: 0.1 10E9/L (ref 0–0.7)
EOSINOPHIL NFR BLD AUTO: 0.9 %
ERYTHROCYTE [DISTWIDTH] IN BLOOD BY AUTOMATED COUNT: 20.6 % (ref 10–15)
FACT X ACT/NOR PPP CHRO: 46 % (ref 70–130)
GFR SERPL CREATININE-BSD FRML MDRD: 33 ML/MIN/{1.73_M2}
GFR SERPL CREATININE-BSD FRML MDRD: 34 ML/MIN/{1.73_M2}
GFR SERPL CREATININE-BSD FRML MDRD: 35 ML/MIN/{1.73_M2}
GLUCOSE BLDC GLUCOMTR-MCNC: 114 MG/DL (ref 70–99)
GLUCOSE BLDC GLUCOMTR-MCNC: 122 MG/DL (ref 70–99)
GLUCOSE BLDC GLUCOMTR-MCNC: 124 MG/DL (ref 70–99)
GLUCOSE BLDC GLUCOMTR-MCNC: 128 MG/DL (ref 70–99)
GLUCOSE BLDC GLUCOMTR-MCNC: 139 MG/DL (ref 70–99)
GLUCOSE BLDC GLUCOMTR-MCNC: 151 MG/DL (ref 70–99)
GLUCOSE BLDC GLUCOMTR-MCNC: 155 MG/DL (ref 70–99)
GLUCOSE BLDC GLUCOMTR-MCNC: 178 MG/DL (ref 70–99)
GLUCOSE SERPL-MCNC: 135 MG/DL (ref 70–99)
GLUCOSE SERPL-MCNC: 155 MG/DL (ref 70–99)
GLUCOSE SERPL-MCNC: 201 MG/DL (ref 70–99)
HCO3 BLD-SCNC: 24 MMOL/L (ref 21–28)
HCO3 BLD-SCNC: 25 MMOL/L (ref 21–28)
HCO3 BLD-SCNC: 25 MMOL/L (ref 21–28)
HCO3 BLDV-SCNC: 25 MMOL/L (ref 21–28)
HCO3 BLDV-SCNC: 26 MMOL/L (ref 21–28)
HCO3 BLDV-SCNC: 26 MMOL/L (ref 21–28)
HCO3 BLDV-SCNC: 27 MMOL/L (ref 21–28)
HCO3 BLDV-SCNC: 27 MMOL/L (ref 21–28)
HCT VFR BLD AUTO: 27.5 % (ref 40–53)
HGB BLD-MCNC: 8.4 G/DL (ref 13.3–17.7)
INR PPP: 2.51 (ref 0.86–1.14)
LACTATE BLD-SCNC: 1 MMOL/L (ref 0.7–2)
LACTATE BLD-SCNC: 1 MMOL/L (ref 0.7–2)
LACTATE BLD-SCNC: 1.1 MMOL/L (ref 0.7–2)
LACTATE BLD-SCNC: 1.3 MMOL/L (ref 0.7–2)
LDH SERPL L TO P-CCNC: 416 U/L (ref 85–227)
LYMPHOCYTES # BLD AUTO: 0.2 10E9/L (ref 0.8–5.3)
LYMPHOCYTES NFR BLD AUTO: 1.8 %
MAGNESIUM SERPL-MCNC: 2.6 MG/DL (ref 1.6–2.3)
MAGNESIUM SERPL-MCNC: 2.6 MG/DL (ref 1.6–2.3)
MCH RBC QN AUTO: 22.3 PG (ref 26.5–33)
MCHC RBC AUTO-ENTMCNC: 30.5 G/DL (ref 31.5–36.5)
MCV RBC AUTO: 73 FL (ref 78–100)
MICROCYTES BLD QL SMEAR: PRESENT
MONOCYTES # BLD AUTO: 1.2 10E9/L (ref 0–1.3)
MONOCYTES NFR BLD AUTO: 10.7 %
MYELOCYTES # BLD: 0.1 10E9/L
MYELOCYTES NFR BLD MANUAL: 0.9 %
NEUTROPHILS # BLD AUTO: 9.9 10E9/L (ref 1.6–8.3)
NEUTROPHILS NFR BLD AUTO: 85.7 %
NRBC # BLD AUTO: 0.4 10*3/UL
NRBC BLD AUTO-RTO: 4 /100
O2/TOTAL GAS SETTING VFR VENT: 21 %
O2/TOTAL GAS SETTING VFR VENT: 40 %
OXYHGB MFR BLD: 97 % (ref 92–100)
OXYHGB MFR BLDV: 37 %
OXYHGB MFR BLDV: 39 %
OXYHGB MFR BLDV: 51 %
OXYHGB MFR BLDV: 52 %
OXYHGB MFR BLDV: 58 %
PCO2 BLD: 36 MM HG (ref 35–45)
PCO2 BLD: 38 MM HG (ref 35–45)
PCO2 BLD: 43 MM HG (ref 35–45)
PCO2 BLDV: 42 MM HG (ref 40–50)
PCO2 BLDV: 43 MM HG (ref 40–50)
PCO2 BLDV: 44 MM HG (ref 40–50)
PCO2 BLDV: 46 MM HG (ref 40–50)
PCO2 BLDV: 47 MM HG (ref 40–50)
PH BLD: 7.38 PH (ref 7.35–7.45)
PH BLD: 7.41 PH (ref 7.35–7.45)
PH BLD: 7.45 PH (ref 7.35–7.45)
PH BLDV: 7.35 PH (ref 7.32–7.43)
PH BLDV: 7.37 PH (ref 7.32–7.43)
PH BLDV: 7.38 PH (ref 7.32–7.43)
PH BLDV: 7.39 PH (ref 7.32–7.43)
PH BLDV: 7.4 PH (ref 7.32–7.43)
PHOSPHATE SERPL-MCNC: 3.5 MG/DL (ref 2.5–4.5)
PHOSPHATE SERPL-MCNC: 3.9 MG/DL (ref 2.5–4.5)
PLATELET # BLD AUTO: 258 10E9/L (ref 150–450)
PLATELET # BLD EST: ABNORMAL 10*3/UL
PO2 BLD: 107 MM HG (ref 80–105)
PO2 BLD: 124 MM HG (ref 80–105)
PO2 BLD: 125 MM HG (ref 80–105)
PO2 BLDV: 25 MM HG (ref 25–47)
PO2 BLDV: 26 MM HG (ref 25–47)
PO2 BLDV: 30 MM HG (ref 25–47)
PO2 BLDV: 32 MM HG (ref 25–47)
PO2 BLDV: 34 MM HG (ref 25–47)
POIKILOCYTOSIS BLD QL SMEAR: SLIGHT
POLYCHROMASIA BLD QL SMEAR: SLIGHT
POTASSIUM SERPL-SCNC: 4.2 MMOL/L (ref 3.4–5.3)
POTASSIUM SERPL-SCNC: 4.2 MMOL/L (ref 3.4–5.3)
POTASSIUM SERPL-SCNC: 4.4 MMOL/L (ref 3.4–5.3)
PROT SERPL-MCNC: 6.2 G/DL (ref 6.8–8.8)
PROT SERPL-MCNC: 6.3 G/DL (ref 6.8–8.8)
PROT SERPL-MCNC: 6.6 G/DL (ref 6.8–8.8)
RBC # BLD AUTO: 3.77 10E12/L (ref 4.4–5.9)
RBC INCLUSIONS BLD: SLIGHT
SODIUM SERPL-SCNC: 136 MMOL/L (ref 133–144)
SODIUM SERPL-SCNC: 137 MMOL/L (ref 133–144)
SODIUM SERPL-SCNC: 137 MMOL/L (ref 133–144)
TARGETS BLD QL SMEAR: SLIGHT
WBC # BLD AUTO: 11.6 10E9/L (ref 4–11)

## 2021-02-20 PROCEDURE — 82805 BLOOD GASES W/O2 SATURATION: CPT | Performed by: STUDENT IN AN ORGANIZED HEALTH CARE EDUCATION/TRAINING PROGRAM

## 2021-02-20 PROCEDURE — 93750 INTERROGATION VAD IN PERSON: CPT | Performed by: INTERNAL MEDICINE

## 2021-02-20 PROCEDURE — 999N000157 HC STATISTIC RCP TIME EA 10 MIN

## 2021-02-20 PROCEDURE — 85260 CLOT FACTOR X STUART-POWER: CPT | Performed by: INTERNAL MEDICINE

## 2021-02-20 PROCEDURE — 83605 ASSAY OF LACTIC ACID: CPT | Performed by: STUDENT IN AN ORGANIZED HEALTH CARE EDUCATION/TRAINING PROGRAM

## 2021-02-20 PROCEDURE — 250N000013 HC RX MED GY IP 250 OP 250 PS 637: Performed by: STUDENT IN AN ORGANIZED HEALTH CARE EDUCATION/TRAINING PROGRAM

## 2021-02-20 PROCEDURE — 94003 VENT MGMT INPAT SUBQ DAY: CPT

## 2021-02-20 PROCEDURE — 258N000003 HC RX IP 258 OP 636: Performed by: INTERNAL MEDICINE

## 2021-02-20 PROCEDURE — 86923 COMPATIBILITY TEST ELECTRIC: CPT | Performed by: INTERNAL MEDICINE

## 2021-02-20 PROCEDURE — 200N000002 HC R&B ICU UMMC

## 2021-02-20 PROCEDURE — 999N000155 HC STATISTIC RAPCV CVP MONITORING

## 2021-02-20 PROCEDURE — 83735 ASSAY OF MAGNESIUM: CPT | Performed by: STUDENT IN AN ORGANIZED HEALTH CARE EDUCATION/TRAINING PROGRAM

## 2021-02-20 PROCEDURE — 272N000078 HC NUTRITION PRODUCT INTERMEDIATE LITER

## 2021-02-20 PROCEDURE — 83735 ASSAY OF MAGNESIUM: CPT | Performed by: INTERNAL MEDICINE

## 2021-02-20 PROCEDURE — 250N000011 HC RX IP 250 OP 636: Performed by: INTERNAL MEDICINE

## 2021-02-20 PROCEDURE — 82803 BLOOD GASES ANY COMBINATION: CPT | Performed by: STUDENT IN AN ORGANIZED HEALTH CARE EDUCATION/TRAINING PROGRAM

## 2021-02-20 PROCEDURE — 250N000009 HC RX 250: Performed by: INTERNAL MEDICINE

## 2021-02-20 PROCEDURE — 999N000044 HC STATISTIC CVC DRESSING CHANGE

## 2021-02-20 PROCEDURE — 272N000066

## 2021-02-20 PROCEDURE — 80053 COMPREHEN METABOLIC PANEL: CPT | Performed by: INTERNAL MEDICINE

## 2021-02-20 PROCEDURE — 86850 RBC ANTIBODY SCREEN: CPT | Performed by: INTERNAL MEDICINE

## 2021-02-20 PROCEDURE — 999N000045 HC STATISTIC DAILY SWAN MONITORING

## 2021-02-20 PROCEDURE — 82330 ASSAY OF CALCIUM: CPT | Performed by: STUDENT IN AN ORGANIZED HEALTH CARE EDUCATION/TRAINING PROGRAM

## 2021-02-20 PROCEDURE — 86901 BLOOD TYPING SEROLOGIC RH(D): CPT | Performed by: INTERNAL MEDICINE

## 2021-02-20 PROCEDURE — 258N000003 HC RX IP 258 OP 636: Performed by: STUDENT IN AN ORGANIZED HEALTH CARE EDUCATION/TRAINING PROGRAM

## 2021-02-20 PROCEDURE — 99233 SBSQ HOSP IP/OBS HIGH 50: CPT | Mod: GC | Performed by: INTERNAL MEDICINE

## 2021-02-20 PROCEDURE — 85730 THROMBOPLASTIN TIME PARTIAL: CPT | Performed by: STUDENT IN AN ORGANIZED HEALTH CARE EDUCATION/TRAINING PROGRAM

## 2021-02-20 PROCEDURE — 90947 DIALYSIS REPEATED EVAL: CPT

## 2021-02-20 PROCEDURE — 80053 COMPREHEN METABOLIC PANEL: CPT | Performed by: STUDENT IN AN ORGANIZED HEALTH CARE EDUCATION/TRAINING PROGRAM

## 2021-02-20 PROCEDURE — 250N000011 HC RX IP 250 OP 636: Performed by: STUDENT IN AN ORGANIZED HEALTH CARE EDUCATION/TRAINING PROGRAM

## 2021-02-20 PROCEDURE — 84100 ASSAY OF PHOSPHORUS: CPT | Performed by: STUDENT IN AN ORGANIZED HEALTH CARE EDUCATION/TRAINING PROGRAM

## 2021-02-20 PROCEDURE — 250N000013 HC RX MED GY IP 250 OP 250 PS 637: Performed by: INTERNAL MEDICINE

## 2021-02-20 PROCEDURE — 85610 PROTHROMBIN TIME: CPT | Performed by: STUDENT IN AN ORGANIZED HEALTH CARE EDUCATION/TRAINING PROGRAM

## 2021-02-20 PROCEDURE — 85025 COMPLETE CBC W/AUTO DIFF WBC: CPT | Performed by: STUDENT IN AN ORGANIZED HEALTH CARE EDUCATION/TRAINING PROGRAM

## 2021-02-20 PROCEDURE — 71045 X-RAY EXAM CHEST 1 VIEW: CPT | Mod: 26 | Performed by: STUDENT IN AN ORGANIZED HEALTH CARE EDUCATION/TRAINING PROGRAM

## 2021-02-20 PROCEDURE — 71045 X-RAY EXAM CHEST 1 VIEW: CPT

## 2021-02-20 PROCEDURE — 86900 BLOOD TYPING SEROLOGIC ABO: CPT | Performed by: INTERNAL MEDICINE

## 2021-02-20 PROCEDURE — 99291 CRITICAL CARE FIRST HOUR: CPT | Mod: 25 | Performed by: INTERNAL MEDICINE

## 2021-02-20 PROCEDURE — 999N001017 HC STATISTIC GLUCOSE BY METER IP

## 2021-02-20 PROCEDURE — 999N000015 HC STATISTIC ARTERIAL MONITORING DAILY

## 2021-02-20 PROCEDURE — 84100 ASSAY OF PHOSPHORUS: CPT | Performed by: INTERNAL MEDICINE

## 2021-02-20 PROCEDURE — 83615 LACTATE (LD) (LDH) ENZYME: CPT | Performed by: STUDENT IN AN ORGANIZED HEALTH CARE EDUCATION/TRAINING PROGRAM

## 2021-02-20 RX ORDER — WARFARIN SODIUM 2 MG/1
2 TABLET ORAL
Status: COMPLETED | OUTPATIENT
Start: 2021-02-20 | End: 2021-02-20

## 2021-02-20 RX ORDER — HYDRALAZINE HYDROCHLORIDE 25 MG/1
25 TABLET, FILM COATED ORAL EVERY 8 HOURS SCHEDULED
Status: DISCONTINUED | OUTPATIENT
Start: 2021-02-20 | End: 2021-02-24

## 2021-02-20 RX ADMIN — BIVALIRUDIN 0.02 MG/KG/HR: 250 INJECTION INTRACAVERNOUS at 12:03

## 2021-02-20 RX ADMIN — CALCIUM CHLORIDE, MAGNESIUM CHLORIDE, SODIUM CHLORIDE, SODIUM BICARBONATE, POTASSIUM CHLORIDE AND SODIUM PHOSPHATE DIBASIC DIHYDRATE 12.5 ML/KG/HR: 3.68; 3.05; 6.34; 3.09; .314; .187 INJECTION INTRAVENOUS at 21:00

## 2021-02-20 RX ADMIN — WARFARIN SODIUM 2 MG: 2 TABLET ORAL at 20:00

## 2021-02-20 RX ADMIN — CALCIUM CHLORIDE, MAGNESIUM CHLORIDE, SODIUM CHLORIDE, SODIUM BICARBONATE, POTASSIUM CHLORIDE AND SODIUM PHOSPHATE DIBASIC DIHYDRATE 12.5 ML/KG/HR: 3.68; 3.05; 6.34; 3.09; .314; .187 INJECTION INTRAVENOUS at 01:50

## 2021-02-20 RX ADMIN — CALCIUM CHLORIDE, MAGNESIUM CHLORIDE, SODIUM CHLORIDE, SODIUM BICARBONATE, POTASSIUM CHLORIDE AND SODIUM PHOSPHATE DIBASIC DIHYDRATE 14 ML/KG/HR: 3.68; 3.05; 6.34; 3.09; .314; .187 INJECTION INTRAVENOUS at 08:22

## 2021-02-20 RX ADMIN — Medication 5 ML: at 07:59

## 2021-02-20 RX ADMIN — CALCIUM CHLORIDE, MAGNESIUM CHLORIDE, SODIUM CHLORIDE, SODIUM BICARBONATE, POTASSIUM CHLORIDE AND SODIUM PHOSPHATE DIBASIC DIHYDRATE 14 ML/KG/HR: 3.68; 3.05; 6.34; 3.09; .314; .187 INJECTION INTRAVENOUS at 14:59

## 2021-02-20 RX ADMIN — Medication 1 PACKET: at 08:00

## 2021-02-20 RX ADMIN — Medication 1 MG: at 07:59

## 2021-02-20 RX ADMIN — ALLOPURINOL 200 MG: 100 TABLET ORAL at 07:59

## 2021-02-20 RX ADMIN — CEFAZOLIN SODIUM 2 G: 2 INJECTION, SOLUTION INTRAVENOUS at 08:11

## 2021-02-20 RX ADMIN — FLUOXETINE HYDROCHLORIDE 30 MG: 20 SOLUTION ORAL at 07:59

## 2021-02-20 RX ADMIN — CEFAZOLIN SODIUM 2 G: 2 INJECTION, SOLUTION INTRAVENOUS at 20:08

## 2021-02-20 RX ADMIN — CALCIUM CHLORIDE, MAGNESIUM CHLORIDE, SODIUM CHLORIDE, SODIUM BICARBONATE, POTASSIUM CHLORIDE AND SODIUM PHOSPHATE DIBASIC DIHYDRATE 14 ML/KG/HR: 3.68; 3.05; 6.34; 3.09; .314; .187 INJECTION INTRAVENOUS at 18:36

## 2021-02-20 RX ADMIN — FINASTERIDE 5 MG: 5 TABLET, FILM COATED ORAL at 07:59

## 2021-02-20 RX ADMIN — CALCIUM CHLORIDE, MAGNESIUM CHLORIDE, SODIUM CHLORIDE, SODIUM BICARBONATE, POTASSIUM CHLORIDE AND SODIUM PHOSPHATE DIBASIC DIHYDRATE: 3.68; 3.05; 6.34; 3.09; .314; .187 INJECTION INTRAVENOUS at 08:22

## 2021-02-20 RX ADMIN — CALCIUM CHLORIDE, MAGNESIUM CHLORIDE, SODIUM CHLORIDE, SODIUM BICARBONATE, POTASSIUM CHLORIDE AND SODIUM PHOSPHATE DIBASIC DIHYDRATE 12.5 ML/KG/HR: 3.68; 3.05; 6.34; 3.09; .314; .187 INJECTION INTRAVENOUS at 05:22

## 2021-02-20 RX ADMIN — OMEPRAZOLE 20 MG: KIT at 07:59

## 2021-02-20 RX ADMIN — HYDRALAZINE HYDROCHLORIDE 25 MG: 25 TABLET, FILM COATED ORAL at 20:07

## 2021-02-20 RX ADMIN — CALCIUM CHLORIDE, MAGNESIUM CHLORIDE, SODIUM CHLORIDE, SODIUM BICARBONATE, POTASSIUM CHLORIDE AND SODIUM PHOSPHATE DIBASIC DIHYDRATE 12.5 ML/KG/HR: 3.68; 3.05; 6.34; 3.09; .314; .187 INJECTION INTRAVENOUS at 13:30

## 2021-02-20 RX ADMIN — CALCIUM CHLORIDE, MAGNESIUM CHLORIDE, SODIUM CHLORIDE, SODIUM BICARBONATE, POTASSIUM CHLORIDE AND SODIUM PHOSPHATE DIBASIC DIHYDRATE 12.5 ML/KG/HR: 3.68; 3.05; 6.34; 3.09; .314; .187 INJECTION INTRAVENOUS at 17:36

## 2021-02-20 RX ADMIN — CALCIUM CHLORIDE, MAGNESIUM CHLORIDE, SODIUM CHLORIDE, SODIUM BICARBONATE, POTASSIUM CHLORIDE AND SODIUM PHOSPHATE DIBASIC DIHYDRATE 12.5 ML/KG/HR: 3.68; 3.05; 6.34; 3.09; .314; .187 INJECTION INTRAVENOUS at 08:22

## 2021-02-20 RX ADMIN — CALCIUM CHLORIDE, MAGNESIUM CHLORIDE, SODIUM CHLORIDE, SODIUM BICARBONATE, POTASSIUM CHLORIDE AND SODIUM PHOSPHATE DIBASIC DIHYDRATE 14 ML/KG/HR: 3.68; 3.05; 6.34; 3.09; .314; .187 INJECTION INTRAVENOUS at 22:30

## 2021-02-20 RX ADMIN — CALCIUM CHLORIDE 1 G: 100 INJECTION, SOLUTION INTRAVENOUS at 16:58

## 2021-02-20 RX ADMIN — CALCIUM CHLORIDE, MAGNESIUM CHLORIDE, SODIUM CHLORIDE, SODIUM BICARBONATE, POTASSIUM CHLORIDE AND SODIUM PHOSPHATE DIBASIC DIHYDRATE 14 ML/KG/HR: 3.68; 3.05; 6.34; 3.09; .314; .187 INJECTION INTRAVENOUS at 03:30

## 2021-02-20 RX ADMIN — ASPIRIN 81 MG CHEWABLE TABLET 81 MG: 81 TABLET CHEWABLE at 07:59

## 2021-02-20 RX ADMIN — DOCUSATE SODIUM 50 MG AND SENNOSIDES 8.6 MG 2 TABLET: 8.6; 5 TABLET, FILM COATED ORAL at 07:59

## 2021-02-20 RX ADMIN — CALCIUM CHLORIDE, MAGNESIUM CHLORIDE, SODIUM CHLORIDE, SODIUM BICARBONATE, POTASSIUM CHLORIDE AND SODIUM PHOSPHATE DIBASIC DIHYDRATE 14 ML/KG/HR: 3.68; 3.05; 6.34; 3.09; .314; .187 INJECTION INTRAVENOUS at 10:32

## 2021-02-20 RX ADMIN — AZITHROMYCIN MONOHYDRATE 500 MG: 500 INJECTION, POWDER, LYOPHILIZED, FOR SOLUTION INTRAVENOUS at 08:11

## 2021-02-20 RX ADMIN — DOCUSATE SODIUM 50 MG AND SENNOSIDES 8.6 MG 2 TABLET: 8.6; 5 TABLET, FILM COATED ORAL at 20:07

## 2021-02-20 ASSESSMENT — ACTIVITIES OF DAILY LIVING (ADL)
ADLS_ACUITY_SCORE: 18

## 2021-02-20 NOTE — PROGRESS NOTES
[facility]    Cardiology Progress Note- Cards 2        Date of Admission:  2/15/2021     Today's Plan  - extubate  - Continue broad spectrum antibiotics     -Cefazolin (2/18/21 -     )   -Azithromycin (2/15/21 -    )    Cefepime (2/16/21 - 2/18/21) (deescalated to cefazolin)    Vancomycin (2/15/21 - 2/18/21) (MRSA nares negative)    Piperacillin tazobactam (2/15/21-2/16/21) (switched for renal protection)  - wean dobutamine as tolerated   - continue bival  - continue CRRT - net negative 500cc  - work towards extubation pending mental status improvement    Assessment & Plan: [facility]      Eliseo Tanner is a 65 year old male with PMHx relevant for NICM with LVEF 15-20%, NYHA III s/p HM III 2/18/2020 (post op complicated by retrosternal hematoma with bleeding in the lungs), s/p ICD placed on 3/16/2020, h/o MSSA driveline infection and bacteremia 11/5/2020 on prophylactic cephalexin, h/o VT on amiodarone, moderate nonobstructive CAD, severe MR, atrial fibrillation on warfarin, hypertension, ABHINAV requiring CPAP, CKD IV, DMII, HLP,  h/o HIT who presented to the Cardiovascular Unit (4E Cardiac ICU) with mixed cardiogenic and distributive shock withan intermittent wide complex tachycardia. Stabilized on low dose dobutamine off of pressors. Minimal oxygen requirements with persistent acute renal failure. Mental status improving     Cardiovascular:    #Mixed Cardiogenic and Septic Shock  #Multiorgan Failure   Presented from [facility] with subacute development of shortness of breath, dyspnea on exertion, dizziness/lightheadedness with near syncopal event found to be febrile with intermittent wide complex tachycardia. Recent COVID19 moderna vaccination. CT chest showing bilateral infiltrates. Community acquired pneumonia vs. Possible recurrent of MSSA bacteremia WBC 24.5, Procal 7.95, , Lactacte 6.9.    Mildly elevated filling pressures on  presentation CVP 18, PA 35/20, PCWP 11. Likely some component of cardiogenic shock. LVAD parameters relatively stable despite markedly elevated LDH 8,531. Euvolemic on exam. Worsening renal function, Cr 4.62, up-trending LFT's/ INR. Legionella antigen positive. Weaned off of vasopressor and maintained on dobutamine. Acute hepatic injury improving and now requiring dialysis. Shock appears to be resolved.     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 Bhatti (ID clinic)   # Troponin elevation (likely demand ischemia)  # Essential Hypertension  # Hyperlipidemia        Patient with long standing history of NICM previously implanted LVAD (HM III) on 02/18/20 due to worsening functional status and systolic ventricular dysfunction (further complicated by RV dysfunction by VAD hemodynamic compensatory mechanism, VT in ICU now on Amiodarone and Atrial Fibrillation with AVR requiring DCCV on 02/28/20).      Elevated cardiac biomarkers on presentation, SGC with only mildly elevated filling pressures. Euvolemic on exam. Troponin elevation likely related to demand ischemia with no concerns for ACS. Most recent VAD interrogation without any significant Flow-Alarms    LDH 8,531. Initial concern for LVAD thrombus. However given relatively stable LVAD parameters, degree of LDH elevation is out of proportion. Will continue to monitor for LVAD alarms.      - continue dobutamine  - Held ACE-I/ARB/ARNi: Lisinopril; due to worsening BERNARDA  - No BB; deferred 2/2 shock  - Aldosterone antagonist: deferred while other medical therapy is  optimized  - SCD prophylaxis: ICD  - Fluid status : euvolemic hypervolemic, maintain net even with CRRT  - MAP Goal: 65-85  - On Warfarin for HM-III INR Goal 2-3, currently bridging with bivalirudin   - Hold Hydralazine 100mg given shock  - Continue ASA 81 mg per oral daily       # Wide complex tachycardia. Atrial Flutter vs Atrial flutter vs Ventricular Tachycardia  # History of Atrial Fibrillation s/p DCCV/history of Atrial Flutter       Wide complex tachycardia HR 140s on presentation in the setting of febrile illness and likely pneumonia. Did not initially respond to adenosine. Concern for VT. Intermittently back into HR 60s with underlying rhythm of atrial flutter 3:1 conduction block. Per EP can not rule out VT, may be dual tachycardias.      - hold Amiodarone gtt  - Ensure K+ >4.0 mEq/L and Mg+2 >2.0  - hold warfarin  - continue bivalirudin  - On cardiac telemetry    Pulmonary  #Hypoxemic Respiratory Failure  #Legionella Pneumonia  Hypoxemic in the setting of mixed cardiogenic septic shock requiring emergent intubation. O2 requirements quickly improved. Currently minimal vent settings  - extubation pending improvement in mental status.     Infectious disease    #Sepsis  #Legionella Pneumonia  #Bilateral pulmonary infiltrates  CT showing bilateral diffuse patchy consolidations concerning for infectious process. Low suspicion for recurrent driveline or possible hardware infection (history of MSSA Chronic Driveline Infection). Urine without pyuria. Urine legionella ag positive.   - f/u blood cultures  - f/u sputum legionella culture  - continue Abx (Cefazolin and Azithromycin)    Renal:  # Acute on Chronic CKD stage IV likely 2/2 Septic Shock   Creatinine continues uptrending 3.23 > 4.62 > 4.9. LIJ Dialysis line. Ongoing CRRT.  - continue CRRT  - Daily Weight's  - Strict I/O's  - Daily BMP's  - Avoid any additional nephrotoxicity      GI  # Shock Liver   # Congestive Hepatopathy  Hepatoccelular pattern of liver  injury  > 3,496, AST 1,441 > 8,490 likely 2/2 to septic shock. INR downtrended s/p 3mg IV vit K. RUQ US without portal vein thrombus.  - Trend LFT's   - continue Tube feeds  - continue bowel regimen     Hematological   # Chronic Microcytic/Hypochromic Anemia (likely related to iron deficiency)  # Coagulopathy related to sepsis   # IgG Kappa monoclonal Gammopathy of undetermined significance     - Monitor Hgb (baseline 8-9g/dL)  - transfuse for Hb < 7.0   - Patient follows with [facility] Critical access [facility] Oncology (Dr. Ibarra)     # Previous Concern For HIT  On previous hospitalization for LVAD placement (02/06/20-03/20/20), concern for HIT. 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    At that time, patient underwent two HIT panel tests (first one was negative and second antibody test was positive). No known thrombosis events. DAVINA antibody was negative raising question about validity of diagnosis . Per hematology at the time (Dr. James), no other cause for isolated thrombocytopenia. Immediate recovery of plts following d/c of heparin. 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     - recommend avoiding heparin products  - continue bivalirudin as bridging for LVAD     Endocrine  # Type II DM     > Last Hgb A1C: 6.8 (01/06/21)     - Continue PTA Insulin Basaglar 10 Units Aq at bedtime  - On Medium sliding scale insulin  - Oral Hypoglycemia Protocol      Neurologic:  # Disorientation/Toxic Metabolic Encephalopathy  # Chronic Intracranial Small Vessel Disease        Patient reported some lightheadedness/dizziness and disorientation the days prior to hospitalization while at home. However, no reports of LOC, seizure activity, focal deficits, or findings for acute intracranial pathology on most recent OSH CT head w/o contrast (02/15/21). Now s/p intubation. Intermittently following commands off of sedation.  Mental status slowly improving  - off of sedation  - extubation pending improvement in MS      DVT Prophylaxis: bivalirudin gtt  Guevara Catheter: not present  Code Status: Full Code  Fluids: no IVFs  Lines: RIJ CVC (2/15), L IJ dialysis catheter (2/16), left radial arterial line (2/15)      Disposition Plan   Expected discharge: > 7 days, recommended to transitional care unit once fluid volume status optimized on oral medication, arrhythmia stabilized , therapeutic anticoagulation achieved and safe disposition plan/ TCU bed available.      Entered: Daniel Fleming MD 02/20/2021, 5:01 PM       The patient's care was discussed with the Attending Physician, Dr. Judd.    Daniel Fleming MD  [facility]  Please see sign in/sign out for up to date coverage information        Late entry - pt seen and examined on 2/20/21  Critical Care ICU Note - Cardiology  Veronica Judd M.D.    I have seen and examined the patient, have discussed the patient with the heart failure team and I agree with the assessment and plan as outlined below. I have personally reviewed vital signs, hemodynamics, medications, laboratory values, and diagnostic testing.     The patient remains unstable in the ICU with on-going need for ventilator support, parenteral medications for the adjustment of blood pressure and cardiac output and maintenance of renal function.      The patient is seen for prolonged ventilator management requiring oxygen and/or pressure modulation; recent cardiac and distributive shock requiring vasopressor and or inotropic agents now improved but remains with low cardiac output requiring  Inotropic and afterload reducing agents; acute renal failure renal replacement therapy and fluid management all to maintain blood pressure and secondary organ function    I personally reviewed:  Arterial and venous blood gases to assess acid base balance, oxygenation, and ventilator settings.     Hemodynamic parameters obtained by central hemodynamic monitoring including RAP, estimated LVEDP, pulmonary artery pressure, cardiac output and vascular resistances in order to adjust fluids and infused medications for blood pressure and cardiac output maintenance.  Ventilatory settings and/or supplement oxygen needs in order to obtain optimal oxygenation and electrolyte balance at low possible pressure support and inspired oxygen tension.    Pt's mental status continues to improve.  Successful pressure support trial and extubation.  Weaned off dobutamine.  Cardiac index declined - marginal but acceptable.  Will continue to monitor.  Continues to require renal replacement therapy without current signs of renal recovery.   May need to resume dobutamine to optimize chance of renal recovery.  LVAD was interrogated at bedside, all parameters reviewed. Device is with normal function, all parameters in expected range. Speed remains at 5500RPM, PI 2-3's, flow is 4.3-1.9 LPM, power 4.2-4.3 muhammad.  LVAD is functioning appropriately and there is no evidence for LVAD thrombosis.    Veronica Judd MD  Section Head - Advanced Heart Failure, Transplantation and Mechanical Circulatory Support  Director - Adult Congenital [facility] Cardiovascular [facility]  Associate Professor of Medicine, [facility]    I spent 40 minutes in critical care of the patient including 25 minutes of direct patient care including serial assessments and discussion with the patient and patient's care team.  ______________________________________________________________________    Interval History   Overnight no acute events. Remains intubated. Minimal vent requirements. Off of sedation. Mental status improved. Continued on CRRT  LFTs downtrending. LDH downtrending.     Date CVP PA PCWP CO CI SVR SvO2   2/16/21 19 40/24 14 5.2 2.4 910 53%   2/19/21 8 34/16 12 4.6 2.15 991 48%   2/20/21 8 26/18 18 4.8 2.2                 Heartmate 3  (centrifugal flow) VS  Flow (Lpm): 4.5 Lpm  Pulse Index (PI): 3.6 PI  Speed (rpm): 5500 rpm  Power (muhammad): 4.3 muhammad  Current Hct settin    Data reviewed today: I reviewed all medications, new labs and imaging results over the last 24 hours.      Physical Exam   Vital Signs: Temp: 97.4  F (36.3  C) Temp src: Oral  Pulse: 102   Resp: 26 SpO2: 100 % O2 Device: Oxymask Oxygen Delivery: 4 LPM  Weight: 214 lbs 8.12 oz    In general, the patient is intubated, lethargic, in no apparent distress.      Neck: RIJ and LIJ catheters in place.   Heart: RRR. S1 and S2 no appreciated. Mechanical whir. No rub, click, or gallop.   Lungs: Bilateral rhonchi and rales   GI: Soft, nontender, nondistended.   Extremities: trace edema.  The pulses are 2+at the radial and DP bilaterally.  Neuro: grossly non focal, opening eyes and responding to commands  Skin: no rashes.    Data   Recent Labs   Lab 21  1544 21  1037 21  0345 21  2210 21  0349 21  0349 21  0401 21  0401 02/15/21  1910 02/15/21  1910   WBC  --   --  11.6* 11.4*  --  11.6*  --  13.7*   < > 24.5*   HGB  --   --  8.4* 8.5*  --  9.1*  --  9.0*   < > 9.9*   MCV  --   --  73* 74*  --  73*  --  73*   < > 74*   PLT  --   --  258 241  --  259  --  267   < > 356   INR  --   --  2.51*  --   --  1.98*  --  2.46*   < > 2.13*    136 137 139   < > 135   < > 136   < > 129*   POTASSIUM 4.4 4.2 4.2 4.2   < > 4.2   < > 4.4   < > 5.1   CHLORIDE 107 106 107 107   < > 105   < > 106   < > 97   CO2 29 26 26 26   < > 23   < > 24   < > 16*   BUN 32* 32* 32* 32*   < > 37*   < > 43*   < > 78*   CR 1.95* 1.92* 2.00* 1.98*   < > 2.05*   < > 2.49*   < > 3.23*   ANIONGAP 1* 5 5 6   < > 7   < > 6   < > 17*   CALOS 7.8* 7.7* 8.2* 8.1*   < > 8.1*   < > 8.0*   < > 8.7   * 201* 155* 153*   < > 179*   < > 113*   < > 215*   ALBUMIN 2.1* 2.1* 2.1* 2.2*   < > 2.2*   < > 2.2*   < > 3.0*   PROTTOTAL 6.2* 6.3* 6.6* 6.5*   < > 6.5*   < > 6.6*   < > 8.2    BILITOTAL 0.7 0.7 0.9 1.0   < > 1.2   < > 1.5*   < > 1.0   ALKPHOS 192* 197* 207* 194*   < > 182*   < > 139   < > 141   * 888* 1,200* 1,419*   < > 2,481*   < > 3,378*   < > 768*   * 1,095* 1,400* 1,570*   < > 2,642*   < > 4,870*   < > 1,441*   TROPI  --   --   --   --   --   --   --   --   --  0.377*    < > = values in this interval not displayed.     TTE 2/15/2021  Interpretation Summary  HM 3 at 5500 RPM  LV size is small, LVIDd = 3.0 cm. Severely reduced left ventricular function.  Doppler of the inflow cannula and outflow graft are normal.  RV is severely dilated. Severely reduced right ventricular function.  Mild TR is present.  The aortic valve is closed with mild regurgitation.  IVC is dilated and patient is on a ventilator.  No pericardial effusion present.

## 2021-02-20 NOTE — PROGRESS NOTES
CRRT STATUS NOTE    DATA:  Time:  7:29 AM  Pressures WNL:  YES  Filter Status:  WDL    Problems Reported/Alarms Noted:  None noted per Primary RN.     Supplies Present:  YES    ASSESSMENT:  Patient Net Fluid Balance:  +229mL yesterday/Even since midnight.   Vital Signs:  Temp:  [95  F (35  C)-98.8  F (37.1  C)] 97.7  F (36.5  C)  Pulse:  [] 102  Resp:  [20-30] 22  MAP:  [53 mmHg-100 mmHg] 88 mmHg  Arterial Line BP: ()/(22-85) 95/73  FiO2 (%):  [40 %] 40 %  SpO2:  [98 %-100 %] 100 %  Labs:    Last Renal Panel:  Sodium   Date Value Ref Range Status   02/20/2021 137 133 - 144 mmol/L Final     Potassium   Date Value Ref Range Status   02/20/2021 4.2 3.4 - 5.3 mmol/L Final     Chloride   Date Value Ref Range Status   02/20/2021 107 94 - 109 mmol/L Final     Carbon Dioxide   Date Value Ref Range Status   02/20/2021 26 20 - 32 mmol/L Final     Anion Gap   Date Value Ref Range Status   02/20/2021 5 3 - 14 mmol/L Final     Glucose   Date Value Ref Range Status   02/20/2021 155 (H) 70 - 99 mg/dL Final     Urea Nitrogen   Date Value Ref Range Status   02/20/2021 32 (H) 7 - 30 mg/dL Final     Creatinine   Date Value Ref Range Status   02/20/2021 2.00 (H) 0.66 - 1.25 mg/dL Final     GFR Estimate   Date Value Ref Range Status   02/20/2021 33 (L) >60 mL/min/[1.73_m2] Final     Comment:     Non  GFR Calc  Starting 12/18/2018, serum creatinine based estimated GFR (eGFR) will be   calculated using the Chronic Kidney Disease Epidemiology Collaboration   (CKD-EPI) equation.       Calcium   Date Value Ref Range Status   02/20/2021 8.2 (L) 8.5 - 10.1 mg/dL Final     Phosphorus   Date Value Ref Range Status   02/20/2021 3.5 2.5 - 4.5 mg/dL Final     Albumin   Date Value Ref Range Status   02/20/2021 2.1 (L) 3.4 - 5.0 g/dL Final     Goals of Therapy: Net negative 0-25mL/hr as tolerated.     INTERVENTIONS:   None needed.     PLAN:  Continue fluid removal as tolerated per goals of therapy.  Check filter daily.   Change filter q 72 hrs and PRN.  Please contact the CRRT resource RN at [phone] with any questions/concerns.

## 2021-02-20 NOTE — PLAN OF CARE
Remains vented; CMV mode throughout shift. Requires suctioning at frequent intervals~secretions creamy/pink tinged. Follows simple commands. Right Picc dressing saturated~changed and oozing at insertion site~MD aware. All lines patent; see flow sheets for LVAD readings and hemodynamic readings. See flow sheets for other assessments. Will continue to monitor closely~keeping MDs as well as family updated.

## 2021-02-20 NOTE — PROGRESS NOTES
PICC site bleeding, cleaned and applied d-stat dry and pressure dressing, advised nurse to remove pressure dressing after 20 mins and if not bleeding anymore, can change dressing after 24hours.

## 2021-02-20 NOTE — PROGRESS NOTES
CRRT STATUS NOTE    DATA:  Time:  6:00 PM  Pressures WNL:  YES  Filter Status:  WDL    Problems Reported/Alarms Noted:  None reported    Supplies Present:  YES    ASSESSMENT:  Patient Net Fluid Balance:  Net positive +119.32  Vital Signs:  Temp: 95.9  F (35.5  C) Temp src: Esophageal  Pulse: 88   Resp: 24 SpO2: 99 % O2 Device: Mechanical Ventilator      Labs:  Last Comprehensive Metabolic Panel:  Sodium   Date Value Ref Range Status   02/19/2021 136 133 - 144 mmol/L Final     Potassium   Date Value Ref Range Status   02/19/2021 4.1 3.4 - 5.3 mmol/L Final     Chloride   Date Value Ref Range Status   02/19/2021 107 94 - 109 mmol/L Final     Carbon Dioxide   Date Value Ref Range Status   02/19/2021 26 20 - 32 mmol/L Final     Anion Gap   Date Value Ref Range Status   02/19/2021 4 3 - 14 mmol/L Final     Glucose   Date Value Ref Range Status   02/19/2021 141 (H) 70 - 99 mg/dL Final     Urea Nitrogen   Date Value Ref Range Status   02/19/2021 33 (H) 7 - 30 mg/dL Final     Creatinine   Date Value Ref Range Status   02/19/2021 2.00 (H) 0.66 - 1.25 mg/dL Final     GFR Estimate   Date Value Ref Range Status   02/19/2021 33 (L) >60 mL/min/[1.73_m2] Final     Comment:     Non  GFR Calc  Starting 12/18/2018, serum creatinine based estimated GFR (eGFR) will be   calculated using the Chronic Kidney Disease Epidemiology Collaboration   (CKD-EPI) equation.       Calcium   Date Value Ref Range Status   02/19/2021 8.2 (L) 8.5 - 10.1 mg/dL Final      Lab Results   Component Value Date    WBC 11.6 02/19/2021     Lab Results   Component Value Date    RBC 3.99 02/19/2021     Lab Results   Component Value Date    HGB 9.1 02/19/2021     Lab Results   Component Value Date    HCT 29.3 02/19/2021     No components found for: MCT  Lab Results   Component Value Date    MCV 73 02/19/2021     Lab Results   Component Value Date    MCH 22.8 02/19/2021     Lab Results   Component Value Date    MCHC 31.1 02/19/2021     Lab Results    Component Value Date    RDW 20.5 02/19/2021     Lab Results   Component Value Date     02/19/2021        Goals of Therapy:  Net neg 0-25 ml/hour as tolerated    INTERVENTIONS:   None required    PLAN:  Continue CRRT per plan of care

## 2021-02-20 NOTE — PHARMACY-ANTICOAGULATION SERVICE
Clinical Pharmacy - Warfarin Dosing Consult     Pharmacy has been consulted to manage this patient s warfarin therapy.  Indication: LVAD/RVAD  Therapy Goal: INR 2-3;Chr Factor 10: 20-40% while bridging with bivalirudin   Warfarin Prior to Admission: Yes  Warfarin PTA Regimen: 6 mg Monday, 4 mg all other days  Significant drug interactions: azithromycin (increased INR), aspirin (increased bleeding risk), bivalirudin (increased bleeding risk)  Recent documented change in oral intake/nutrition: No    INR   Date Value Ref Range Status   02/20/2021 2.51 (H) 0.86 - 1.14 Final   02/19/2021 1.98 (H) 0.86 - 1.14 Final     Chromogenic Factor 10   Date Value Ref Range Status   02/20/2021 46 (L) 70 - 130 % Final     Comment:     Therapeutic Range:  A Chromogenic Factor 10 level of approximately 20-40%   inversely correlates with an INR of 2-3 for patients receiving Warfarin.   Chromogenic Factor 10 levels below 20% indicate an INR greater than 3 and   levels above 40% indicate an INR less than 2.         Recommend warfarin 2 mg today given interaction with azithromycin and lower than anticipated chromogenic factor X.  Pharmacy will monitor Eliseo Tanner daily and order warfarin doses to achieve specified goal.      Please contact pharmacy as soon as possible if the warfarin needs to be held for a procedure or if the warfarin goals change.      Keagan Ramos, PharmD, BCCCP

## 2021-02-20 NOTE — PROGRESS NOTES
Nephrology Progress Note  02/20/2021         Assessment & Recommendations:   66 yo M with PMHx  NICM  s/p HM III , VT, severe MR, atrial fibrillation on warfarin, hypertension, CKD IV, DMII, HIT presented to OSH with fevers and cough in the setting of syncopal episode transferred to [facility] for further cares. Hospitalization complicated by Afib with RVR with hypotension and intubation with upgrade of cares to the ICU. Found to be in septic shock 2/2 legionella pneumonia with possible cardiogenic shock. Nephrology was consulted for evaluation and management of anuric BERNARDA     Oligoanuric BERNARDA 2/2 ischemic ATN  Baseline Cr last yr s/p LVED placement was around 1. On admission ~2.3 and doubled within 24 hours with rapid decrease in UOP. UA unimpressive with baseline proteinuria and new granular casts. Initiated on RRT 2/16.   - Continue CRRT with net even UF for target CVP of 10-14  - Avoid nephrotoxins as able  - Renally dose meds     Electrolytes - stable on CRRT  Acid/base - stable on CRRT  Anemia - Hb stable     Recommendations were communicated to primary team via note     Seen and discussed with Dr. Colby Mabry MD   [phone]    Interval History :   Nursing and provider notes from last 24 hours reviewed.  In the last 24 hours Eliseo Tanner been stable  Continued to be intubated and sedated   Continued to be on CRRT    Physical Exam:   I/O last 3 completed shifts:  In: 2965.52 [I.V.:1165.52; NG/GT:360]  Out: 2739 [Other:2739]   BP (!) 125/93   Pulse 105   Temp 97.5  F (36.4  C)   Resp 29   Wt 97.3 kg (214 lb 8.1 oz)   SpO2 99%   BMI 33.60 kg/m       General : Pt sedated and intubated, not in acute distress   Lungs : anterior lung fields are clear  Cardiac : S1, S2 present  Abdomen : Soft/ND  LE : mild Edema noted  Dialysis Access : right IJ    Labs:   All labs reviewed by me  Electrolytes/Renal -   Recent Labs   Lab Test 02/20/21  0345 02/19/21  2210 02/19/21  1557 02/19/21  0348  02/19/21  0349    139 136   < > 135   POTASSIUM 4.2 4.2 4.1   < > 4.2   CHLORIDE 107 107 107   < > 105   CO2 26 26 26   < > 23   BUN 32* 32* 33*   < > 37*   CR 2.00* 1.98* 2.00*   < > 2.05*   * 153* 141*   < > 179*   CALOS 8.2* 8.1* 8.2*   < > 8.1*   MAG 2.6* 2.6* 2.6*  --  2.7*   PHOS 3.5  --  3.8  --  3.4    < > = values in this interval not displayed.       CBC -   Recent Labs   Lab Test 02/20/21 0345 02/19/21 2210 02/19/21 0349   WBC 11.6* 11.4* 11.6*   HGB 8.4* 8.5* 9.1*    241 259       LFTs -   Recent Labs   Lab Test 02/20/21 0345 02/19/21 2210 02/19/21  1557   ALKPHOS 207* 194* 200*   BILITOTAL 0.9 1.0 1.0   ALT 1,200* 1,419* 1,878*   AST 1,400* 1,570* 2,036*   PROTTOTAL 6.6* 6.5* 6.8   ALBUMIN 2.1* 2.2* 2.2*       Iron Panel -   Recent Labs   Lab Test 11/16/20  0616 02/08/20  0408   IRON 16* 25*   IRONSAT 6* 8*   HEAVENLY 75 189       Current Medications:    allopurinol  200 mg Oral or Feeding Tube Daily     aspirin  81 mg Oral Daily     azithromycin  500 mg Intravenous Q24H     B and C vitamin Complex with folic acid  5 mL Per Feeding Tube Daily     ceFAZolin  2 g Intravenous Q12H     doxazosin  1 mg Oral Daily     finasteride  5 mg Oral Daily     FLUoxetine  30 mg Oral Daily     omeprazole  20 mg Oral QAM AC     protein modular  1 packet Per Feeding Tube 4x Daily     senna-docusate  2 tablet Oral BID       [Held by provider] amiodarone       bivalirudin ANTICOAGULANT (ANGIOMAX) 250mg/250mL in 0.9% Sodium Chloride 0.02 mg/kg/hr (02/20/21 1000)     dextrose       dextrose Stopped (02/17/21 1600)     CRRT replacement solution 14 mL/kg/hr (02/20/21 1032)     DOBUTamine 3 mcg/kg/min (02/20/21 1000)     insulin (regular) 2 Units/hr (02/20/21 1000)     - MEDICATION INSTRUCTIONS -       CRRT replacement solution 200 mL/hr at 02/20/21 0822     CRRT replacement solution 12.5 mL/kg/hr (02/20/21 0822)     Warfarin Therapy Reminder       Pao Mabry MD

## 2021-02-20 NOTE — PROGRESS NOTES
[facility], Procedure Note          Extubation:       Eilseo Tanner  MRN# [id number]   February 20, 2021, 11:41 AM         Patient extubated at: February 20, 2021, 11:41 AM   Supplemental Oxygen: Via face mask at 4 liters per minute   Cough: The cough is good   Secretion Mode: PRN suction with assistance   Secretion Amount: Small amount, thick and tan in color   Respiratory Exam:: Breath sounds: good aeration     Location: bilaterally   Skin Exam:: Patient color: natural   Patient Status: Currently appears comfortable   Arterial Blood Gasses: pH Arterial (pH)   Date Value   02/20/2021 7.41     pO2 Arterial (mm Hg)   Date Value   02/20/2021 107 (H)     pCO2 Arterial (mm Hg)   Date Value   02/20/2021 38     Bicarbonate Arterial (mmol/L)   Date Value   02/20/2021 24            Recorded by Elvis Miles

## 2021-02-20 NOTE — PROGRESS NOTES
CRRT STATUS NOTE    DATA:  Time:  6 PM  Pressures WNL:  YES  Filter Status:  WDL    Problems Reported/Alarms Noted:  None reported    Supplies Present:  YES    ASSESSMENT:  Patient Net Fluid Balance:  Net negative -547.02 since midnight  Vital Signs:  Temp: 97.4  F (36.3  C) Temp src: Oral  Pulse: 101   Resp: 28 SpO2: 97 % O2 Device: Oxymask Oxygen Delivery: 4 LPM    Labs:  Last Comprehensive Metabolic Panel:  Sodium   Date Value Ref Range Status   02/20/2021 137 133 - 144 mmol/L Final     Potassium   Date Value Ref Range Status   02/20/2021 4.4 3.4 - 5.3 mmol/L Final     Chloride   Date Value Ref Range Status   02/20/2021 107 94 - 109 mmol/L Final     Carbon Dioxide   Date Value Ref Range Status   02/20/2021 29 20 - 32 mmol/L Final     Anion Gap   Date Value Ref Range Status   02/20/2021 1 (L) 3 - 14 mmol/L Final     Glucose   Date Value Ref Range Status   02/20/2021 135 (H) 70 - 99 mg/dL Final     Urea Nitrogen   Date Value Ref Range Status   02/20/2021 32 (H) 7 - 30 mg/dL Final     Creatinine   Date Value Ref Range Status   02/20/2021 1.95 (H) 0.66 - 1.25 mg/dL Final     GFR Estimate   Date Value Ref Range Status   02/20/2021 34 (L) >60 mL/min/[1.73_m2] Final     Comment:     Non  GFR Calc  Starting 12/18/2018, serum creatinine based estimated GFR (eGFR) will be   calculated using the Chronic Kidney Disease Epidemiology Collaboration   (CKD-EPI) equation.       Calcium   Date Value Ref Range Status   02/20/2021 7.8 (L) 8.5 - 10.1 mg/dL Final      Lab Results   Component Value Date    WBC 11.6 02/20/2021     Lab Results   Component Value Date    RBC 3.77 02/20/2021     Lab Results   Component Value Date    HGB 8.4 02/20/2021     Lab Results   Component Value Date    HCT 27.5 02/20/2021     No components found for: MCT  Lab Results   Component Value Date    MCV 73 02/20/2021     Lab Results   Component Value Date    MCH 22.3 02/20/2021     Lab Results   Component Value Date    MCHC 30.5 02/20/2021      Lab Results   Component Value Date    RDW 20.6 02/20/2021     Lab Results   Component Value Date     02/20/2021        Goals of Therapy:  net neg 0-25 ml/hr as tolerated    INTERVENTIONS:   None required    PLAN:  Continue CRRT per plan of care

## 2021-02-21 ENCOUNTER — APPOINTMENT (OUTPATIENT)
Dept: OCCUPATIONAL THERAPY | Facility: CLINIC | Age: 66
DRG: 870 | End: 2021-02-21
Attending: INTERNAL MEDICINE
Payer: MEDICARE

## 2021-02-21 ENCOUNTER — APPOINTMENT (OUTPATIENT)
Dept: GENERAL RADIOLOGY | Facility: CLINIC | Age: 66
DRG: 870 | End: 2021-02-21
Attending: STUDENT IN AN ORGANIZED HEALTH CARE EDUCATION/TRAINING PROGRAM
Payer: MEDICARE

## 2021-02-21 ENCOUNTER — APPOINTMENT (OUTPATIENT)
Dept: GENERAL RADIOLOGY | Facility: CLINIC | Age: 66
DRG: 870 | End: 2021-02-21
Payer: MEDICARE

## 2021-02-21 LAB
ABO + RH BLD: NORMAL
ABO + RH BLD: NORMAL
ALBUMIN SERPL-MCNC: 2.2 G/DL (ref 3.4–5)
ALBUMIN SERPL-MCNC: 2.3 G/DL (ref 3.4–5)
ALBUMIN SERPL-MCNC: 2.4 G/DL (ref 3.4–5)
ALP SERPL-CCNC: 196 U/L (ref 40–150)
ALP SERPL-CCNC: 199 U/L (ref 40–150)
ALP SERPL-CCNC: 209 U/L (ref 40–150)
ALT SERPL W P-5'-P-CCNC: 256 U/L (ref 0–70)
ALT SERPL W P-5'-P-CCNC: 319 U/L (ref 0–70)
ALT SERPL W P-5'-P-CCNC: 448 U/L (ref 0–70)
ANION GAP SERPL CALCULATED.3IONS-SCNC: 4 MMOL/L (ref 3–14)
ANION GAP SERPL CALCULATED.3IONS-SCNC: 4 MMOL/L (ref 3–14)
ANION GAP SERPL CALCULATED.3IONS-SCNC: 7 MMOL/L (ref 3–14)
ANISOCYTOSIS BLD QL SMEAR: SLIGHT
APTT PPP: 45 SEC (ref 22–37)
AST SERPL W P-5'-P-CCNC: 447 U/L (ref 0–45)
AST SERPL W P-5'-P-CCNC: 540 U/L (ref 0–45)
AST SERPL W P-5'-P-CCNC: 709 U/L (ref 0–45)
BACTERIA SPEC CULT: NO GROWTH
BACTERIA SPEC CULT: NO GROWTH
BASE DEFICIT BLDV-SCNC: 0.5 MMOL/L
BASE DEFICIT BLDV-SCNC: 0.8 MMOL/L
BASE DEFICIT BLDV-SCNC: 1.6 MMOL/L
BASE DEFICIT BLDV-SCNC: 2 MMOL/L
BASE DEFICIT BLDV-SCNC: 3.9 MMOL/L
BASE EXCESS BLDA CALC-SCNC: 0.3 MMOL/L
BASE EXCESS BLDV CALC-SCNC: 0.9 MMOL/L
BASE EXCESS BLDV CALC-SCNC: 2.1 MMOL/L
BASOPHILS # BLD AUTO: 0.3 10E9/L (ref 0–0.2)
BASOPHILS NFR BLD AUTO: 2 %
BILIRUB SERPL-MCNC: 0.8 MG/DL (ref 0.2–1.3)
BILIRUB SERPL-MCNC: 0.8 MG/DL (ref 0.2–1.3)
BILIRUB SERPL-MCNC: 1 MG/DL (ref 0.2–1.3)
BLD GP AB SCN SERPL QL: NORMAL
BLD PROD TYP BPU: NORMAL
BLD PROD TYP BPU: NORMAL
BLD UNIT ID BPU: 0
BLOOD BANK CMNT PATIENT-IMP: NORMAL
BLOOD PRODUCT CODE: NORMAL
BPU ID: NORMAL
BUN SERPL-MCNC: 30 MG/DL (ref 7–30)
BUN SERPL-MCNC: 31 MG/DL (ref 7–30)
BUN SERPL-MCNC: 33 MG/DL (ref 7–30)
CA-I BLD-MCNC: 4.2 MG/DL (ref 4.4–5.2)
CA-I BLD-MCNC: 4.5 MG/DL (ref 4.4–5.2)
CA-I BLD-MCNC: 4.6 MG/DL (ref 4.4–5.2)
CA-I BLD-MCNC: 4.8 MG/DL (ref 4.4–5.2)
CALCIUM SERPL-MCNC: 7.6 MG/DL (ref 8.5–10.1)
CALCIUM SERPL-MCNC: 8.1 MG/DL (ref 8.5–10.1)
CALCIUM SERPL-MCNC: 8.4 MG/DL (ref 8.5–10.1)
CHLORIDE SERPL-SCNC: 104 MMOL/L (ref 94–109)
CHLORIDE SERPL-SCNC: 105 MMOL/L (ref 94–109)
CHLORIDE SERPL-SCNC: 105 MMOL/L (ref 94–109)
CO2 SERPL-SCNC: 22 MMOL/L (ref 20–32)
CO2 SERPL-SCNC: 26 MMOL/L (ref 20–32)
CO2 SERPL-SCNC: 27 MMOL/L (ref 20–32)
CREAT SERPL-MCNC: 1.85 MG/DL (ref 0.66–1.25)
CREAT SERPL-MCNC: 1.85 MG/DL (ref 0.66–1.25)
CREAT SERPL-MCNC: 2.05 MG/DL (ref 0.66–1.25)
DIFFERENTIAL METHOD BLD: ABNORMAL
EOSINOPHIL # BLD AUTO: 0.3 10E9/L (ref 0–0.7)
EOSINOPHIL NFR BLD AUTO: 2 %
ERYTHROCYTE [DISTWIDTH] IN BLOOD BY AUTOMATED COUNT: 21 % (ref 10–15)
FACT X ACT/NOR PPP CHRO: 45 % (ref 70–130)
GFR SERPL CREATININE-BSD FRML MDRD: 32 ML/MIN/{1.73_M2}
GFR SERPL CREATININE-BSD FRML MDRD: 37 ML/MIN/{1.73_M2}
GFR SERPL CREATININE-BSD FRML MDRD: 37 ML/MIN/{1.73_M2}
GLUCOSE BLDC GLUCOMTR-MCNC: 101 MG/DL (ref 70–99)
GLUCOSE BLDC GLUCOMTR-MCNC: 107 MG/DL (ref 70–99)
GLUCOSE BLDC GLUCOMTR-MCNC: 141 MG/DL (ref 70–99)
GLUCOSE BLDC GLUCOMTR-MCNC: 146 MG/DL (ref 70–99)
GLUCOSE BLDC GLUCOMTR-MCNC: 222 MG/DL (ref 70–99)
GLUCOSE BLDC GLUCOMTR-MCNC: 93 MG/DL (ref 70–99)
GLUCOSE SERPL-MCNC: 138 MG/DL (ref 70–99)
GLUCOSE SERPL-MCNC: 193 MG/DL (ref 70–99)
GLUCOSE SERPL-MCNC: 94 MG/DL (ref 70–99)
HCO3 BLD-SCNC: 25 MMOL/L (ref 21–28)
HCO3 BLDV-SCNC: 21 MMOL/L (ref 21–28)
HCO3 BLDV-SCNC: 23 MMOL/L (ref 21–28)
HCO3 BLDV-SCNC: 23 MMOL/L (ref 21–28)
HCO3 BLDV-SCNC: 24 MMOL/L (ref 21–28)
HCO3 BLDV-SCNC: 25 MMOL/L (ref 21–28)
HCO3 BLDV-SCNC: 26 MMOL/L (ref 21–28)
HCO3 BLDV-SCNC: 27 MMOL/L (ref 21–28)
HCT VFR BLD AUTO: 27.8 % (ref 40–53)
HGB BLD-MCNC: 8.4 G/DL (ref 13.3–17.7)
INR PPP: 2.21 (ref 0.86–1.14)
LACTATE BLD-SCNC: 0.8 MMOL/L (ref 0.7–2)
LACTATE BLD-SCNC: 0.8 MMOL/L (ref 0.7–2)
LACTATE BLD-SCNC: 1 MMOL/L (ref 0.7–2)
LACTATE BLD-SCNC: 1.2 MMOL/L (ref 0.7–2)
LDH SERPL L TO P-CCNC: 397 U/L (ref 85–227)
LYMPHOCYTES # BLD AUTO: 0.9 10E9/L (ref 0.8–5.3)
LYMPHOCYTES NFR BLD AUTO: 7 %
MAGNESIUM SERPL-MCNC: 2.6 MG/DL (ref 1.6–2.3)
MAGNESIUM SERPL-MCNC: 2.6 MG/DL (ref 1.6–2.3)
MCH RBC QN AUTO: 22 PG (ref 26.5–33)
MCHC RBC AUTO-ENTMCNC: 30.2 G/DL (ref 31.5–36.5)
MCV RBC AUTO: 73 FL (ref 78–100)
METAMYELOCYTES # BLD: 0.4 10E9/L
METAMYELOCYTES NFR BLD MANUAL: 3 %
MONOCYTES # BLD AUTO: 1.6 10E9/L (ref 0–1.3)
MONOCYTES NFR BLD AUTO: 12 %
MYELOCYTES # BLD: 0.8 10E9/L
MYELOCYTES NFR BLD MANUAL: 6 %
NEUTROPHILS # BLD AUTO: 9.2 10E9/L (ref 1.6–8.3)
NEUTROPHILS NFR BLD AUTO: 68 %
NUM BPU REQUESTED: 1
O2/TOTAL GAS SETTING VFR VENT: 21 %
O2/TOTAL GAS SETTING VFR VENT: ABNORMAL %
O2/TOTAL GAS SETTING VFR VENT: ABNORMAL %
O2/TOTAL GAS SETTING VFR VENT: NORMAL %
O2/TOTAL GAS SETTING VFR VENT: NORMAL %
OXYHGB MFR BLD: 90 % (ref 92–100)
OXYHGB MFR BLDV: 34 %
OXYHGB MFR BLDV: 38 %
OXYHGB MFR BLDV: 44 %
OXYHGB MFR BLDV: 46 %
OXYHGB MFR BLDV: 51 %
OXYHGB MFR BLDV: 51 %
OXYHGB MFR BLDV: 94 %
PCO2 BLD: 37 MM HG (ref 35–45)
PCO2 BLDV: 36 MM HG (ref 40–50)
PCO2 BLDV: 36 MM HG (ref 40–50)
PCO2 BLDV: 39 MM HG (ref 40–50)
PCO2 BLDV: 41 MM HG (ref 40–50)
PCO2 BLDV: 41 MM HG (ref 40–50)
PCO2 BLDV: 43 MM HG (ref 40–50)
PCO2 BLDV: 44 MM HG (ref 40–50)
PH BLD: 7.43 PH (ref 7.35–7.45)
PH BLDV: 7.36 PH (ref 7.32–7.43)
PH BLDV: 7.37 PH (ref 7.32–7.43)
PH BLDV: 7.38 PH (ref 7.32–7.43)
PH BLDV: 7.4 PH (ref 7.32–7.43)
PH BLDV: 7.4 PH (ref 7.32–7.43)
PH BLDV: 7.41 PH (ref 7.32–7.43)
PH BLDV: 7.41 PH (ref 7.32–7.43)
PHOSPHATE SERPL-MCNC: 3.8 MG/DL (ref 2.5–4.5)
PHOSPHATE SERPL-MCNC: 4.5 MG/DL (ref 2.5–4.5)
PLATELET # BLD AUTO: 225 10E9/L (ref 150–450)
PO2 BLD: 63 MM HG (ref 80–105)
PO2 BLDV: 23 MM HG (ref 25–47)
PO2 BLDV: 25 MM HG (ref 25–47)
PO2 BLDV: 27 MM HG (ref 25–47)
PO2 BLDV: 27 MM HG (ref 25–47)
PO2 BLDV: 30 MM HG (ref 25–47)
PO2 BLDV: 30 MM HG (ref 25–47)
PO2 BLDV: 81 MM HG (ref 25–47)
POIKILOCYTOSIS BLD QL SMEAR: SLIGHT
POLYCHROMASIA BLD QL SMEAR: SLIGHT
POTASSIUM SERPL-SCNC: 4.4 MMOL/L (ref 3.4–5.3)
POTASSIUM SERPL-SCNC: 4.7 MMOL/L (ref 3.4–5.3)
POTASSIUM SERPL-SCNC: 4.8 MMOL/L (ref 3.4–5.3)
PROT SERPL-MCNC: 6.5 G/DL (ref 6.8–8.8)
PROT SERPL-MCNC: 6.6 G/DL (ref 6.8–8.8)
PROT SERPL-MCNC: 7.1 G/DL (ref 6.8–8.8)
RBC # BLD AUTO: 3.81 10E12/L (ref 4.4–5.9)
RBC INCLUSIONS BLD: SLIGHT
SODIUM SERPL-SCNC: 134 MMOL/L (ref 133–144)
SODIUM SERPL-SCNC: 135 MMOL/L (ref 133–144)
SODIUM SERPL-SCNC: 135 MMOL/L (ref 133–144)
SPECIMEN EXP DATE BLD: NORMAL
SPECIMEN SOURCE: NORMAL
TRANSFUSION STATUS PATIENT QL: NORMAL
TRANSFUSION STATUS PATIENT QL: NORMAL
VZV DNA SPEC QL NAA+PROBE: NOT DETECTED
WBC # BLD AUTO: 13.5 10E9/L (ref 4–11)

## 2021-02-21 PROCEDURE — 250N000013 HC RX MED GY IP 250 OP 250 PS 637: Performed by: STUDENT IN AN ORGANIZED HEALTH CARE EDUCATION/TRAINING PROGRAM

## 2021-02-21 PROCEDURE — 84100 ASSAY OF PHOSPHORUS: CPT | Performed by: INTERNAL MEDICINE

## 2021-02-21 PROCEDURE — 85730 THROMBOPLASTIN TIME PARTIAL: CPT | Performed by: STUDENT IN AN ORGANIZED HEALTH CARE EDUCATION/TRAINING PROGRAM

## 2021-02-21 PROCEDURE — 83605 ASSAY OF LACTIC ACID: CPT | Performed by: STUDENT IN AN ORGANIZED HEALTH CARE EDUCATION/TRAINING PROGRAM

## 2021-02-21 PROCEDURE — 83735 ASSAY OF MAGNESIUM: CPT | Performed by: STUDENT IN AN ORGANIZED HEALTH CARE EDUCATION/TRAINING PROGRAM

## 2021-02-21 PROCEDURE — 250N000009 HC RX 250: Performed by: INTERNAL MEDICINE

## 2021-02-21 PROCEDURE — 258N000003 HC RX IP 258 OP 636: Performed by: STUDENT IN AN ORGANIZED HEALTH CARE EDUCATION/TRAINING PROGRAM

## 2021-02-21 PROCEDURE — 99233 SBSQ HOSP IP/OBS HIGH 50: CPT | Mod: GC | Performed by: INTERNAL MEDICINE

## 2021-02-21 PROCEDURE — 85260 CLOT FACTOR X STUART-POWER: CPT | Performed by: STUDENT IN AN ORGANIZED HEALTH CARE EDUCATION/TRAINING PROGRAM

## 2021-02-21 PROCEDURE — 82330 ASSAY OF CALCIUM: CPT | Performed by: STUDENT IN AN ORGANIZED HEALTH CARE EDUCATION/TRAINING PROGRAM

## 2021-02-21 PROCEDURE — P9016 RBC LEUKOCYTES REDUCED: HCPCS | Performed by: INTERNAL MEDICINE

## 2021-02-21 PROCEDURE — 93750 INTERROGATION VAD IN PERSON: CPT | Performed by: INTERNAL MEDICINE

## 2021-02-21 PROCEDURE — 999N000065 XR CHEST PORT 1 VW

## 2021-02-21 PROCEDURE — 999N000015 HC STATISTIC ARTERIAL MONITORING DAILY

## 2021-02-21 PROCEDURE — 999N001017 HC STATISTIC GLUCOSE BY METER IP

## 2021-02-21 PROCEDURE — 250N000013 HC RX MED GY IP 250 OP 250 PS 637: Performed by: INTERNAL MEDICINE

## 2021-02-21 PROCEDURE — 200N000002 HC R&B ICU UMMC

## 2021-02-21 PROCEDURE — 83615 LACTATE (LD) (LDH) ENZYME: CPT | Performed by: STUDENT IN AN ORGANIZED HEALTH CARE EDUCATION/TRAINING PROGRAM

## 2021-02-21 PROCEDURE — 83735 ASSAY OF MAGNESIUM: CPT | Performed by: INTERNAL MEDICINE

## 2021-02-21 PROCEDURE — 999N000045 HC STATISTIC DAILY SWAN MONITORING

## 2021-02-21 PROCEDURE — 97110 THERAPEUTIC EXERCISES: CPT | Mod: GO

## 2021-02-21 PROCEDURE — 71045 X-RAY EXAM CHEST 1 VIEW: CPT | Mod: 26 | Performed by: STUDENT IN AN ORGANIZED HEALTH CARE EDUCATION/TRAINING PROGRAM

## 2021-02-21 PROCEDURE — 250N000011 HC RX IP 250 OP 636: Performed by: STUDENT IN AN ORGANIZED HEALTH CARE EDUCATION/TRAINING PROGRAM

## 2021-02-21 PROCEDURE — 99291 CRITICAL CARE FIRST HOUR: CPT | Mod: 25 | Performed by: INTERNAL MEDICINE

## 2021-02-21 PROCEDURE — 85025 COMPLETE CBC W/AUTO DIFF WBC: CPT | Performed by: STUDENT IN AN ORGANIZED HEALTH CARE EDUCATION/TRAINING PROGRAM

## 2021-02-21 PROCEDURE — 999N000155 HC STATISTIC RAPCV CVP MONITORING

## 2021-02-21 PROCEDURE — 85610 PROTHROMBIN TIME: CPT | Performed by: STUDENT IN AN ORGANIZED HEALTH CARE EDUCATION/TRAINING PROGRAM

## 2021-02-21 PROCEDURE — 258N000003 HC RX IP 258 OP 636: Performed by: INTERNAL MEDICINE

## 2021-02-21 PROCEDURE — 80053 COMPREHEN METABOLIC PANEL: CPT | Performed by: INTERNAL MEDICINE

## 2021-02-21 PROCEDURE — 82805 BLOOD GASES W/O2 SATURATION: CPT | Performed by: STUDENT IN AN ORGANIZED HEALTH CARE EDUCATION/TRAINING PROGRAM

## 2021-02-21 PROCEDURE — 84100 ASSAY OF PHOSPHORUS: CPT | Performed by: STUDENT IN AN ORGANIZED HEALTH CARE EDUCATION/TRAINING PROGRAM

## 2021-02-21 PROCEDURE — 71045 X-RAY EXAM CHEST 1 VIEW: CPT

## 2021-02-21 PROCEDURE — 97530 THERAPEUTIC ACTIVITIES: CPT | Mod: GO

## 2021-02-21 PROCEDURE — 90947 DIALYSIS REPEATED EVAL: CPT

## 2021-02-21 PROCEDURE — 80053 COMPREHEN METABOLIC PANEL: CPT | Performed by: STUDENT IN AN ORGANIZED HEALTH CARE EDUCATION/TRAINING PROGRAM

## 2021-02-21 PROCEDURE — 250N000012 HC RX MED GY IP 250 OP 636 PS 637: Performed by: INTERNAL MEDICINE

## 2021-02-21 RX ORDER — DOBUTAMINE HYDROCHLORIDE 200 MG/100ML
3 INJECTION INTRAVENOUS CONTINUOUS
Status: DISCONTINUED | OUTPATIENT
Start: 2021-02-21 | End: 2021-03-13

## 2021-02-21 RX ORDER — NICOTINE POLACRILEX 4 MG
15-30 LOZENGE BUCCAL
Status: DISCONTINUED | OUTPATIENT
Start: 2021-02-21 | End: 2021-02-21

## 2021-02-21 RX ORDER — FINASTERIDE 5 MG/1
5 TABLET, FILM COATED ORAL DAILY
Status: DISCONTINUED | OUTPATIENT
Start: 2021-02-22 | End: 2021-03-17 | Stop reason: HOSPADM

## 2021-02-21 RX ORDER — DEXTROSE MONOHYDRATE 25 G/50ML
25-50 INJECTION, SOLUTION INTRAVENOUS
Status: DISCONTINUED | OUTPATIENT
Start: 2021-02-21 | End: 2021-02-21

## 2021-02-21 RX ORDER — WARFARIN SODIUM 2 MG/1
2 TABLET ORAL
Status: COMPLETED | OUTPATIENT
Start: 2021-02-21 | End: 2021-02-21

## 2021-02-21 RX ORDER — TAMSULOSIN HYDROCHLORIDE 0.4 MG/1
0.4 CAPSULE ORAL DAILY
Status: DISCONTINUED | OUTPATIENT
Start: 2021-02-22 | End: 2021-03-17 | Stop reason: HOSPADM

## 2021-02-21 RX ADMIN — DOBUTAMINE HYDROCHLORIDE 3 MCG/KG/MIN: 200 INJECTION INTRAVENOUS at 10:56

## 2021-02-21 RX ADMIN — CALCIUM CHLORIDE, MAGNESIUM CHLORIDE, SODIUM CHLORIDE, SODIUM BICARBONATE, POTASSIUM CHLORIDE AND SODIUM PHOSPHATE DIBASIC DIHYDRATE 12.5 ML/KG/HR: 3.68; 3.05; 6.34; 3.09; .314; .187 INJECTION INTRAVENOUS at 15:21

## 2021-02-21 RX ADMIN — CALCIUM CHLORIDE, MAGNESIUM CHLORIDE, SODIUM CHLORIDE, SODIUM BICARBONATE, POTASSIUM CHLORIDE AND SODIUM PHOSPHATE DIBASIC DIHYDRATE 12.5 ML/KG/HR: 3.68; 3.05; 6.34; 3.09; .314; .187 INJECTION INTRAVENOUS at 02:04

## 2021-02-21 RX ADMIN — CALCIUM CHLORIDE, MAGNESIUM CHLORIDE, SODIUM CHLORIDE, SODIUM BICARBONATE, POTASSIUM CHLORIDE AND SODIUM PHOSPHATE DIBASIC DIHYDRATE 12.5 ML/KG/HR: 3.68; 3.05; 6.34; 3.09; .314; .187 INJECTION INTRAVENOUS at 08:43

## 2021-02-21 RX ADMIN — ASPIRIN 81 MG CHEWABLE TABLET 81 MG: 81 TABLET CHEWABLE at 10:01

## 2021-02-21 RX ADMIN — CALCIUM CHLORIDE, MAGNESIUM CHLORIDE, SODIUM CHLORIDE, SODIUM BICARBONATE, POTASSIUM CHLORIDE AND SODIUM PHOSPHATE DIBASIC DIHYDRATE 14 ML/KG/HR: 3.68; 3.05; 6.34; 3.09; .314; .187 INJECTION INTRAVENOUS at 03:55

## 2021-02-21 RX ADMIN — HYDRALAZINE HYDROCHLORIDE 25 MG: 25 TABLET, FILM COATED ORAL at 22:17

## 2021-02-21 RX ADMIN — CALCIUM CHLORIDE, MAGNESIUM CHLORIDE, SODIUM CHLORIDE, SODIUM BICARBONATE, POTASSIUM CHLORIDE AND SODIUM PHOSPHATE DIBASIC DIHYDRATE 14 ML/KG/HR: 3.68; 3.05; 6.34; 3.09; .314; .187 INJECTION INTRAVENOUS at 08:06

## 2021-02-21 RX ADMIN — ALLOPURINOL 200 MG: 100 TABLET ORAL at 10:01

## 2021-02-21 RX ADMIN — HYDRALAZINE HYDROCHLORIDE 10 MG: 20 INJECTION INTRAMUSCULAR; INTRAVENOUS at 16:09

## 2021-02-21 RX ADMIN — CEFAZOLIN SODIUM 2 G: 2 INJECTION, SOLUTION INTRAVENOUS at 20:08

## 2021-02-21 RX ADMIN — OMEPRAZOLE 20 MG: KIT at 09:32

## 2021-02-21 RX ADMIN — INSULIN ASPART 2 UNITS: 100 INJECTION, SOLUTION INTRAVENOUS; SUBCUTANEOUS at 12:15

## 2021-02-21 RX ADMIN — CALCIUM CHLORIDE, MAGNESIUM CHLORIDE, SODIUM CHLORIDE, SODIUM BICARBONATE, POTASSIUM CHLORIDE AND SODIUM PHOSPHATE DIBASIC DIHYDRATE: 3.68; 3.05; 6.34; 3.09; .314; .187 INJECTION INTRAVENOUS at 03:55

## 2021-02-21 RX ADMIN — FINASTERIDE 5 MG: 5 TABLET, FILM COATED ORAL at 09:32

## 2021-02-21 RX ADMIN — CALCIUM CHLORIDE, MAGNESIUM CHLORIDE, SODIUM CHLORIDE, SODIUM BICARBONATE, POTASSIUM CHLORIDE AND SODIUM PHOSPHATE DIBASIC DIHYDRATE 14 ML/KG/HR: 3.68; 3.05; 6.34; 3.09; .314; .187 INJECTION INTRAVENOUS at 10:00

## 2021-02-21 RX ADMIN — HYDRALAZINE HYDROCHLORIDE 25 MG: 25 TABLET, FILM COATED ORAL at 14:22

## 2021-02-21 RX ADMIN — Medication 1 MG: at 09:32

## 2021-02-21 RX ADMIN — CALCIUM CHLORIDE, MAGNESIUM CHLORIDE, SODIUM CHLORIDE, SODIUM BICARBONATE, POTASSIUM CHLORIDE AND SODIUM PHOSPHATE DIBASIC DIHYDRATE: 3.68; 3.05; 6.34; 3.09; .314; .187 INJECTION INTRAVENOUS at 10:00

## 2021-02-21 RX ADMIN — AZITHROMYCIN MONOHYDRATE 500 MG: 500 INJECTION, POWDER, LYOPHILIZED, FOR SOLUTION INTRAVENOUS at 11:26

## 2021-02-21 RX ADMIN — CALCIUM CHLORIDE, MAGNESIUM CHLORIDE, SODIUM CHLORIDE, SODIUM BICARBONATE, POTASSIUM CHLORIDE AND SODIUM PHOSPHATE DIBASIC DIHYDRATE 12.5 ML/KG/HR: 3.68; 3.05; 6.34; 3.09; .314; .187 INJECTION INTRAVENOUS at 03:55

## 2021-02-21 RX ADMIN — CEFAZOLIN SODIUM 2 G: 2 INJECTION, SOLUTION INTRAVENOUS at 10:32

## 2021-02-21 RX ADMIN — CALCIUM CHLORIDE, MAGNESIUM CHLORIDE, SODIUM CHLORIDE, SODIUM BICARBONATE, POTASSIUM CHLORIDE AND SODIUM PHOSPHATE DIBASIC DIHYDRATE 14 ML/KG/HR: 3.68; 3.05; 6.34; 3.09; .314; .187 INJECTION INTRAVENOUS at 22:38

## 2021-02-21 RX ADMIN — FLUOXETINE HYDROCHLORIDE 30 MG: 20 SOLUTION ORAL at 09:33

## 2021-02-21 RX ADMIN — CALCIUM CHLORIDE, MAGNESIUM CHLORIDE, SODIUM CHLORIDE, SODIUM BICARBONATE, POTASSIUM CHLORIDE AND SODIUM PHOSPHATE DIBASIC DIHYDRATE 12.5 ML/KG/HR: 3.68; 3.05; 6.34; 3.09; .314; .187 INJECTION INTRAVENOUS at 08:07

## 2021-02-21 RX ADMIN — CALCIUM CHLORIDE, MAGNESIUM CHLORIDE, SODIUM CHLORIDE, SODIUM BICARBONATE, POTASSIUM CHLORIDE AND SODIUM PHOSPHATE DIBASIC DIHYDRATE 14 ML/KG/HR: 3.68; 3.05; 6.34; 3.09; .314; .187 INJECTION INTRAVENOUS at 15:21

## 2021-02-21 RX ADMIN — CALCIUM CHLORIDE, MAGNESIUM CHLORIDE, SODIUM CHLORIDE, SODIUM BICARBONATE, POTASSIUM CHLORIDE AND SODIUM PHOSPHATE DIBASIC DIHYDRATE 14 ML/KG/HR: 3.68; 3.05; 6.34; 3.09; .314; .187 INJECTION INTRAVENOUS at 02:08

## 2021-02-21 RX ADMIN — CALCIUM CHLORIDE, MAGNESIUM CHLORIDE, SODIUM CHLORIDE, SODIUM BICARBONATE, POTASSIUM CHLORIDE AND SODIUM PHOSPHATE DIBASIC DIHYDRATE 14 ML/KG/HR: 3.68; 3.05; 6.34; 3.09; .314; .187 INJECTION INTRAVENOUS at 18:58

## 2021-02-21 RX ADMIN — CALCIUM CHLORIDE, MAGNESIUM CHLORIDE, SODIUM CHLORIDE, SODIUM BICARBONATE, POTASSIUM CHLORIDE AND SODIUM PHOSPHATE DIBASIC DIHYDRATE 14 ML/KG/HR: 3.68; 3.05; 6.34; 3.09; .314; .187 INJECTION INTRAVENOUS at 08:43

## 2021-02-21 RX ADMIN — HYDRALAZINE HYDROCHLORIDE 25 MG: 25 TABLET, FILM COATED ORAL at 06:05

## 2021-02-21 RX ADMIN — Medication 5 ML: at 09:33

## 2021-02-21 RX ADMIN — CALCIUM CHLORIDE, MAGNESIUM CHLORIDE, SODIUM CHLORIDE, SODIUM BICARBONATE, POTASSIUM CHLORIDE AND SODIUM PHOSPHATE DIBASIC DIHYDRATE 12.5 ML/KG/HR: 3.68; 3.05; 6.34; 3.09; .314; .187 INJECTION INTRAVENOUS at 10:00

## 2021-02-21 RX ADMIN — CALCIUM CHLORIDE 1 G: 100 INJECTION, SOLUTION INTRAVENOUS at 14:12

## 2021-02-21 RX ADMIN — CALCIUM CHLORIDE, MAGNESIUM CHLORIDE, SODIUM CHLORIDE, SODIUM BICARBONATE, POTASSIUM CHLORIDE AND SODIUM PHOSPHATE DIBASIC DIHYDRATE: 3.68; 3.05; 6.34; 3.09; .314; .187 INJECTION INTRAVENOUS at 08:43

## 2021-02-21 RX ADMIN — WARFARIN SODIUM 2 MG: 2 TABLET ORAL at 19:16

## 2021-02-21 RX ADMIN — DOCUSATE SODIUM 50 MG AND SENNOSIDES 8.6 MG 2 TABLET: 8.6; 5 TABLET, FILM COATED ORAL at 20:08

## 2021-02-21 RX ADMIN — INSULIN ASPART 1 UNITS: 100 INJECTION, SOLUTION INTRAVENOUS; SUBCUTANEOUS at 17:15

## 2021-02-21 RX ADMIN — CALCIUM CHLORIDE, MAGNESIUM CHLORIDE, SODIUM CHLORIDE, SODIUM BICARBONATE, POTASSIUM CHLORIDE AND SODIUM PHOSPHATE DIBASIC DIHYDRATE 12.5 ML/KG/HR: 3.68; 3.05; 6.34; 3.09; .314; .187 INJECTION INTRAVENOUS at 23:00

## 2021-02-21 RX ADMIN — CALCIUM CHLORIDE, MAGNESIUM CHLORIDE, SODIUM CHLORIDE, SODIUM BICARBONATE, POTASSIUM CHLORIDE AND SODIUM PHOSPHATE DIBASIC DIHYDRATE 12.5 ML/KG/HR: 3.68; 3.05; 6.34; 3.09; .314; .187 INJECTION INTRAVENOUS at 18:59

## 2021-02-21 ASSESSMENT — ACTIVITIES OF DAILY LIVING (ADL)
ADLS_ACUITY_SCORE: 13
ADLS_ACUITY_SCORE: 14
ADLS_ACUITY_SCORE: 13
ADLS_ACUITY_SCORE: 13

## 2021-02-21 NOTE — PROGRESS NOTES
CRRT STATUS NOTE    DATA:  Time:  4:40 PM  Pressures WNL:  Yes  Filter Status:  WDL    Problems Reported/Alarms Noted:  Air detected, TMP too high    Supplies Present:  Yes    ASSESSMENT:  Patient Net Fluid Balance:  -308cc since midnight  Vital Signs:  Temp: 97.6  F (36.4  C) Temp src: Axillary  Pulse: 117   Resp: 20 SpO2: 96 % O2 Device: None (Room air)      Labs:  Last Comprehensive Metabolic Panel:  Sodium   Date Value Ref Range Status   02/21/2021 134 133 - 144 mmol/L Final     Potassium   Date Value Ref Range Status   02/21/2021 4.8 3.4 - 5.3 mmol/L Final     Chloride   Date Value Ref Range Status   02/21/2021 104 94 - 109 mmol/L Final     Carbon Dioxide   Date Value Ref Range Status   02/21/2021 22 20 - 32 mmol/L Final     Anion Gap   Date Value Ref Range Status   02/21/2021 7 3 - 14 mmol/L Final     Glucose   Date Value Ref Range Status   02/21/2021 193 (H) 70 - 99 mg/dL Final     Urea Nitrogen   Date Value Ref Range Status   02/21/2021 33 (H) 7 - 30 mg/dL Final     Creatinine   Date Value Ref Range Status   02/21/2021 2.05 (H) 0.66 - 1.25 mg/dL Final     GFR Estimate   Date Value Ref Range Status   02/21/2021 32 (L) >60 mL/min/[1.73_m2] Final     Comment:     Non  GFR Calc  Starting 12/18/2018, serum creatinine based estimated GFR (eGFR) will be   calculated using the Chronic Kidney Disease Epidemiology Collaboration   (CKD-EPI) equation.       Calcium   Date Value Ref Range Status   02/21/2021 7.6 (L) 8.5 - 10.1 mg/dL Final      Lab Results   Component Value Date    WBC 13.5 02/21/2021     Lab Results   Component Value Date    RBC 3.81 02/21/2021     Lab Results   Component Value Date    HGB 8.4 02/21/2021     Lab Results   Component Value Date    HCT 27.8 02/21/2021     No components found for: MCT  Lab Results   Component Value Date    MCV 73 02/21/2021     Lab Results   Component Value Date    MCH 22.0 02/21/2021     Lab Results   Component Value Date    MCHC 30.2 02/21/2021     Lab  Results   Component Value Date    RDW 21.0 02/21/2021     Lab Results   Component Value Date     02/21/2021        Goals of Therapy:  I=O    INTERVENTIONS:   Resource called to pt's bedside for air detected alarm d/t spike falling out of Pre bag. Circuit was restarted at 0913. Resource called to bedside again for alarm of TMP too high about 30min after start of new circuit. Circuit was restarted again at 1122. No issues since.    PLAN:  Continue with plan of care. Contact CRRT resource RN with any questions or concerns.

## 2021-02-21 NOTE — PROGRESS NOTES
CRRT STATUS NOTE    DATA:  Time:  6:28 AM  Pressures WNL:  YES  Filter Status:  WDL    Problems Reported/Alarms Noted:  Circuit due to be changed    Supplies Present:  YES    ASSESSMENT:  Patient Net Fluid Balance:  -485 since midnight  Vital Signs:  Temp: 97.7  F (36.5  C) Temp src: Oral  Pulse: 98   Resp: 20 SpO2: 94 % O2 Device: None (Room air) Oxygen Delivery: 4 LPM    Labs:  Last Comprehensive Metabolic Panel:  Sodium   Date Value Ref Range Status   02/21/2021 135 133 - 144 mmol/L Final     Potassium   Date Value Ref Range Status   02/21/2021 4.4 3.4 - 5.3 mmol/L Final     Chloride   Date Value Ref Range Status   02/21/2021 105 94 - 109 mmol/L Final     Carbon Dioxide   Date Value Ref Range Status   02/21/2021 26 20 - 32 mmol/L Final     Anion Gap   Date Value Ref Range Status   02/21/2021 4 3 - 14 mmol/L Final     Glucose   Date Value Ref Range Status   02/21/2021 94 70 - 99 mg/dL Final     Urea Nitrogen   Date Value Ref Range Status   02/21/2021 30 7 - 30 mg/dL Final     Creatinine   Date Value Ref Range Status   02/21/2021 1.85 (H) 0.66 - 1.25 mg/dL Final     GFR Estimate   Date Value Ref Range Status   02/21/2021 37 (L) >60 mL/min/[1.73_m2] Final     Comment:     Non  GFR Calc  Starting 12/18/2018, serum creatinine based estimated GFR (eGFR) will be   calculated using the Chronic Kidney Disease Epidemiology Collaboration   (CKD-EPI) equation.       Calcium   Date Value Ref Range Status   02/21/2021 8.4 (L) 8.5 - 10.1 mg/dL Final      Lab Results   Component Value Date    WBC 13.5 02/21/2021     Lab Results   Component Value Date    RBC 3.81 02/21/2021     Lab Results   Component Value Date    HGB 8.4 02/21/2021     Lab Results   Component Value Date    HCT 27.8 02/21/2021     No components found for: MCT  Lab Results   Component Value Date    MCV 73 02/21/2021     Lab Results   Component Value Date    MCH 22.0 02/21/2021     Lab Results   Component Value Date    MCHC 30.2 02/21/2021     Lab  Results   Component Value Date    RDW 21.0 02/21/2021     Lab Results   Component Value Date     02/21/2021        Goals of Therapy:  0-25 ml/hr as tolerated    INTERVENTIONS:   Restarted 2/21 0422    PLAN:  Continue with plan of care and notify CRRT RN with any changes.

## 2021-02-21 NOTE — PROGRESS NOTES
[facility]    Cardiology Progress Note- Cards 2        Date of Admission:  2/15/2021     Today's Plan  - Restart low dose dobutamine  - Transfuse 1 unit PRBCs  - Continue broad spectrum antibiotics     -Cefazolin (2/18/21 -     )   -Azithromycin (2/15/21 -    )    Cefepime (2/16/21 - 2/18/21) (deescalated to cefazolin)    Vancomycin (2/15/21 - 2/18/21) (MRSA nares negative)    Piperacillin tazobactam (2/15/21-2/16/21) (switched for renal protection)  - stop bival  - continue CRRT - net negative 1 L  - remove SGC    Assessment & Plan: SL      Eliseo Tanner is a 65 year old male with PMHx relevant for NICM with LVEF 15-20%, NYHA III s/p HM III 2/18/2020 (post op complicated by retrosternal hematoma with bleeding in the lungs), s/p ICD placed on 3/16/2020, h/o MSSA driveline infection and bacteremia 11/5/2020 on prophylactic cephalexin, h/o VT on amiodarone, moderate nonobstructive CAD, severe MR, atrial fibrillation on warfarin, hypertension, ABHINAV requiring CPAP, CKD IV, DMII, HLP,  h/o HIT who presented to the Cardiovascular Unit (4E Cardiac ICU) with mixed cardiogenic and distributive shock withan intermittent wide complex tachycardia. Stabilized on low dose dobutamine off of pressors. Minimal oxygen requirements with persistent acute renal failure. Significantly improved mental status s/p extubation.     Cardiovascular:    #Mixed Cardiogenic and Septic Shock  #Multiorgan Failure   Presented from [facility] with subacute development of shortness of breath, dyspnea on exertion, dizziness/lightheadedness with near syncopal event found to be febrile with intermittent wide complex tachycardia. Recent COVID19 moderna vaccination. CT chest showing bilateral infiltrates. Community acquired pneumonia vs. Possible recurrent of MSSA bacteremia WBC 24.5, Procal 7.95, , Lactacte 6.9.    Mildly elevated filling pressures on presentation CVP 18,  PA 35/20, PCWP 11. Likely some component of cardiogenic shock. LVAD parameters relatively stable despite markedly elevated LDH 8,531. Euvolemic on exam. Worsening renal function, Cr 4.62, up-trending LFT's/ INR. Legionella antigen positive. Weaned off of vasopressor and maintained on dobutamine. Acute hepatic injury improving and now requiring dialysis. Shock appears to be resolved.     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 Bhatti (ID clinic)   # Troponin elevation (likely demand ischemia)  # Essential Hypertension  # Hyperlipidemia        Patient with long standing history of NICM previously implanted LVAD (HM III) on 02/18/20 due to worsening functional status and systolic ventricular dysfunction (further complicated by RV dysfunction by VAD hemodynamic compensatory mechanism, VT in ICU now on Amiodarone and Atrial Fibrillation with AVR requiring DCCV on 02/28/20).      Elevated cardiac biomarkers on presentation, SGC with only mildly elevated filling pressures. Euvolemic on exam. Troponin elevation likely related to demand ischemia with no concerns for ACS. Most recent VAD interrogation without any significant Flow-Alarms    LDH 8,531. Initial concern for LVAD thrombus. However given relatively stable LVAD parameters, degree of LDH elevation is out of proportion. Will continue to monitor for LVAD alarms.      - continue dobutamine  - Held ACE-I/ARB/ARNi: Lisinopril; due to worsening BERNARDA  - No BB; deferred 2/2 shock  - Aldosterone antagonist: deferred while other medical therapy is optimized  - SCD prophylaxis:  ICD  - Fluid status : euvolemic, maintain euvolemic with CRRT  - MAP Goal: 65-85  - On Warfarin for HM-III INR Goal 2-3  - Continue Hydralazine 25mg   - Continue ASA 81 mg per oral daily       # Wide complex tachycardia. Atrial Flutter vs Atrial flutter vs Ventricular Tachycardia  # History of Atrial Fibrillation s/p DCCV/history of Atrial Flutter       Wide complex tachycardia HR 140s on presentation in the setting of febrile illness and likely pneumonia. Did not initially respond to adenosine. Concern for VT. Intermittently back into HR 60s with underlying rhythm of atrial flutter 3:1 conduction block. Per EP can not rule out VT, may be dual tachycardias.      - hold Amiodarone gtt  - Ensure K+ >4.0 mEq/L and Mg+2 >2.0  - continue warfarin  - On cardiac telemetry    Pulmonary  #Hypoxemic Respiratory Failure  #Legionella Pneumonia  Hypoxemic in the setting of mixed cardiogenic septic shock requiring emergent intubation. O2 requirements quickly improved. S/p extubation on room air    Infectious disease    #Sepsis  #Legionella Pneumonia  #Bilateral pulmonary infiltrates  CT showing bilateral diffuse patchy consolidations concerning for infectious process. Low suspicion for recurrent driveline or possible hardware infection (history of MSSA Chronic Driveline Infection). Urine without pyuria. Urine legionella ag positive.   - f/u blood cultures  - continue Abx (Cefazolin and Azithromycin)    Renal:  # Acute on Chronic CKD stage IV likely 2/2 Septic Shock   Creatinine continues uptrending 3.23 > 4.62 > 4.9. LIJ Dialysis line. Ongoing CRRT. No evidence of renal recovery  - continue CRRT  - Daily Weight's  - Strict I/O's  - Daily BMP's  - Avoid any additional nephrotoxicity      GI  # Shock Liver   # Congestive Hepatopathy  Hepatoccelular pattern of liver injury  > 3,496, AST 1,441 > 8,490 likely 2/2 to septic shock. INR downtrended s/p 3mg IV vit K. RUQ US without portal vein thrombus. LFTs continue to downtrend.    - Trend LFT's   - continue Tube feeds  - continue bowel regimen     Hematological   # Chronic Microcytic/Hypochromic Anemia (likely related to iron deficiency)  # Coagulopathy related to sepsis   # IgG Kappa monoclonal Gammopathy of undetermined significance     - Monitor Hgb (baseline 8-9g/dL)  - transfuse for Hb < 7.0   - Patient follows with [facility] (Dr. Ibarra)     # Previous Concern For HIT  On previous hospitalization for LVAD placement (02/06/20-03/20/20), concern for HIT. Platelets 250K --> ~ 90K wu documented history of exposure to unfractionate heparin products at OSH prior to his installation at the [facility].    At that time, patient underwent two HIT panel tests (first one was negative and second antibody test was positive). No known thrombosis events. DAVINA antibody was negative raising question about validity of diagnosis . Per hematology at the time (Dr. James), no other cause for isolated thrombocytopenia. Immediate recovery of plts following d/c of heparin. 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     - recommend avoiding heparin products  - continue warfarin     Endocrine  # Type II DM     > Last Hgb A1C: 6.8 (01/06/21)     - Continue PTA Insulin Basaglar 10 Units Aq at bedtime  - On Medium sliding scale insulin  - Oral Hypoglycemia Protocol      Neurologic:  # Disorientation/Toxic Metabolic Encephalopathy - Resolved  # Chronic Intracranial Small Vessel Disease        Patient reported some lightheadedness/dizziness and disorientation the days prior to hospitalization while at home. However, no reports of LOC, seizure activity, focal deficits, or findings for acute intracranial pathology on most recent OSH CT head w/o contrast (02/15/21). Mental status improved, s/p extubation      DVT Prophylaxis: therapeutic warfarin  Guevara Catheter: not present  Code Status: Full Code  Fluids: no IVFs  Lines: RIJ CVC (2/15), L IJ dialysis catheter (2/16), left radial arterial  line (2/15)      Disposition Plan   Expected discharge: > 7 days, recommended to transitional care unit once fluid volume status optimized on oral medication, arrhythmia stabilized , therapeutic anticoagulation achieved and safe disposition plan/ TCU bed available.      Entered: Daniel Fleming MD 02/21/2021, 5:43 PM       The patient's care was discussed with the Attending Physician, Dr. Judd.    Daniel Fleming MD  [facility]  Please see sign in/sign out for up to date coverage information      Critical Care ICU Note - Cardiology  Veronica Judd M.D.    I have seen and examined the patient, have discussed the patient with the heart failure team and I agree with the assessment and plan as outlined below. I have personally reviewed vital signs, hemodynamics, medications, laboratory values, and diagnostic testing.     The patient remains in the ICU with on-going need for parenteral medications for the adjustment of blood pressure and cardiac output and maintenance of renal function.      The patient is seen for recent cardiac and distributive shock requiring vasopressor and or inotropic agents now improved but remains with low cardiac output requiring  Inotropic and afterload reducing agents; acute renal failure renal replacement therapy and fluid management all to maintain blood pressure and secondary organ function    I personally reviewed:  Hemodynamic parameters obtained by central hemodynamic monitoring including RAP, estimated LVEDP, pulmonary artery pressure, cardiac output and vascular resistances in order to adjust fluids and infused medications for blood pressure and cardiac output maintenance.    Weaned off dobutamine yesterday.  Cardiac index with persistent decline off dobutamine.  Continues to require renal replacement therapy without current signs of renal recovery.   Resumed dobutamine to optimize chance of renal recovery with improvement in cardiac  index.  CVP and  MVO2 correlated allowing removing of PA catheter.  LVAD was interrogated at bedside, all parameters reviewed. Device is with normal function, all parameters in expected range. Speed remains at 5500RPM, PI 2-3's, flow is 4.1-4.9 LPM, power 4.2-4.4 muhammad.  LVAD is functioning appropriately and there is no evidence for LVAD thrombosis.    Veronica Judd MD  Section Head - Advanced Heart Failure, Transplantation and Mechanical Circulatory Support  Director - Adult Congenital [facility]ovascular [facility]  Associate Professor of Medicine, [facility]    I spent 40 minutes in critical care of the patient including 25 minutes of direct patient care including serial assessments and discussion with the patient and patient's care team.  ______________________________________________________________________    Interval History   Overnight no acute events. Extubated. Mental status improved and appears back to baseline. Continued on CRRT.  LFTs downtrending. CI low this AM following d/c of dobutamine. Restarted low dose dobutamine.     Date CVP PA PCWP CO CI SVR SvO2   21 19 40/24 14 5.2 2.4 910 53%   21 8 34/16 12 4.6 2.15 991 48%   21 8 /18 18 4.8 2.2     21 10 32/16 16 4.1 1.9 1090 38%     Heartmate 3 (centrifugal flow) VS  Flow (Lpm): 4.6 Lpm  Pulse Index (PI): 3.3 PI  Speed (rpm): 5500 rpm  Power (muhammad): 4.3 muhammad  Current Hct settin    Data reviewed today: I reviewed all medications, new labs and imaging results over the last 24 hours.      Physical Exam   Vital Signs: Temp: 97.6  F (36.4  C) Temp src: Axillary  Pulse: 116   Resp: 20 SpO2: 96 % O2 Device: None (Room air)    Weight: 213 lbs 6.48 oz    In general, the patient is awake alert and oriented, in no apparent distress.      Neck: RIJ and LIJ catheters in place.   Heart: RRR. S1 and S2 no appreciated. Mechanical whir. No rub, click, or gallop.   Lungs: Bilateral rhonchi and rales   GI: Soft, nontender,  nondistended.   Extremities: trace edema. The pulses are 2+at the radial and DP bilaterally.  Neuro: grossly non focal  Skin: no rashes.    Data   Recent Labs   Lab 02/21/21  1211 02/21/21  0335 02/20/21  1544 02/20/21  0345 02/20/21  0345 02/19/21  2210 02/19/21  0349 02/19/21  0349 02/15/21  1910 02/15/21  1910   WBC  --  13.5*  --   --  11.6* 11.4*  --  11.6*   < > 24.5*   HGB  --  8.4*  --   --  8.4* 8.5*  --  9.1*   < > 9.9*   MCV  --  73*  --   --  73* 74*  --  73*   < > 74*   PLT  --  225  --   --  258 241  --  259   < > 356   INR  --  2.21*  --   --  2.51*  --   --  1.98*   < > 2.13*    135 137   < > 137 139   < > 135   < > 129*   POTASSIUM 4.8 4.4 4.4   < > 4.2 4.2   < > 4.2   < > 5.1   CHLORIDE 104 105 107   < > 107 107   < > 105   < > 97   CO2 22 26 29   < > 26 26   < > 23   < > 16*   BUN 33* 30 32*   < > 32* 32*   < > 37*   < > 78*   CR 2.05* 1.85* 1.95*   < > 2.00* 1.98*   < > 2.05*   < > 3.23*   ANIONGAP 7 4 1*   < > 5 6   < > 7   < > 17*   CALOS 7.6* 8.4* 7.8*   < > 8.2* 8.1*   < > 8.1*   < > 8.7   * 94 135*   < > 155* 153*   < > 179*   < > 215*   ALBUMIN 2.2* 2.3* 2.1*   < > 2.1* 2.2*   < > 2.2*   < > 3.0*   PROTTOTAL 6.5* 6.6* 6.2*   < > 6.6* 6.5*   < > 6.5*   < > 8.2   BILITOTAL 0.8 0.8 0.7   < > 0.9 1.0   < > 1.2   < > 1.0   ALKPHOS 199* 196* 192*   < > 207* 194*   < > 182*   < > 141   * 448* 701*   < > 1,200* 1,419*   < > 2,481*   < > 768*   * 709* 926*   < > 1,400* 1,570*   < > 2,642*   < > 1,441*   TROPI  --   --   --   --   --   --   --   --   --  0.377*    < > = values in this interval not displayed.     TTE 2/15/2021  Interpretation Summary  HM 3 at 5500 RPM  LV size is small, LVIDd = 3.0 cm. Severely reduced left ventricular function.  Doppler of the inflow cannula and outflow graft are normal.  RV is severely dilated. Severely reduced right ventricular function.  Mild TR is present.  The aortic valve is closed with mild regurgitation.  IVC is dilated and patient is  on a ventilator.  No pericardial effusion present.

## 2021-02-21 NOTE — PLAN OF CARE
Alert and oriented; resp effortless/even. Occasional productive cough. Remains painfree. Tolerating sips water. LVAD patent~see flow sheets for readings. CRRT patent~able to meet therapy goals. MD notified last PM of SVO2 37 and CI results~plan to watch hemodynamics closely. See flow sheets for hemodynamic readings. Will continue to closely monitor; keeping MDs updated.

## 2021-02-21 NOTE — PROGRESS NOTES
Nephrology Progress Note  02/21/2021         Assessment & Recommendations:   64 yo M with PMHx  NICM  s/p HM III , VT, severe MR, atrial fibrillation on warfarin, hypertension, CKD IV, DMII, HIT presented to OSH with fevers and cough in the setting of syncopal episode transferred to [facility] for further cares. Hospitalization complicated by Afib with RVR with hypotension and intubation with upgrade of cares to the ICU. Found to be in septic shock 2/2 legionella pneumonia with possible cardiogenic shock. Nephrology was consulted for evaluation and management of anuric BERNARDA     Oligoanuric BERNARDA 2/2 ischemic ATN  Baseline Cr last yr s/p LVED placement was around 1. On admission ~2.3 and doubled within 24 hours with rapid decrease in UOP. UA unimpressive with baseline proteinuria and new granular casts. Initiated on RRT 2/16.   - Continue CRRT with net even UF   - Hemodynamically stable, will consider transitioning to HD in coming days  - Avoid nephrotoxins as able  - Renally dose meds     Electrolytes - stable on CRRT  Acid/base - stable on CRRT  Anemia - Hb stable     Recommendations were communicated to primary team via note     Seen and discussed with Dr. Colby Mabry MD   [phone]    Interval History :   Nursing and provider notes from last 24 hours reviewed.  In the last 24 hours Eliseo Tanner got extubated and doing well  Denied being in pain or having SOB    Physical Exam:   I/O last 3 completed shifts:  In: 2117.98 [P.O.:590; I.V.:1077.98; NG/GT:150]  Out: 3003 [Other:3003]   BP (!) 125/93   Pulse 108   Temp 97.5  F (36.4  C) (Axillary)   Resp 18   Wt 96.8 kg (213 lb 6.5 oz)   SpO2 (!) 86%   BMI 33.42 kg/m       General : Pt awake, not in acute distress   Lungs : anterior lung fields are clear  Cardiac : LVAD in place  Abdomen : Soft/ND/NT  LE : no Edema noted  Dialysis Access : right IJ    Labs:   All labs reviewed by me  Electrolytes/Renal -   Recent Labs   Lab Test 02/21/21  8378  02/20/21  1544 02/20/21  1037 02/20/21  0345    137 136 137   POTASSIUM 4.4 4.4 4.2 4.2   CHLORIDE 105 107 106 107   CO2 26 29 26 26   BUN 30 32* 32* 32*   CR 1.85* 1.95* 1.92* 2.00*   GLC 94 135* 201* 155*   CALOS 8.4* 7.8* 7.7* 8.2*   MAG 2.6* 2.6*  --  2.6*   PHOS 3.8 3.9  --  3.5       CBC -   Recent Labs   Lab Test 02/21/21  0335 02/20/21  0345 02/19/21  2210   WBC 13.5* 11.6* 11.4*   HGB 8.4* 8.4* 8.5*    258 241       LFTs -   Recent Labs   Lab Test 02/21/21  0335 02/20/21  1544 02/20/21  1037   ALKPHOS 196* 192* 197*   BILITOTAL 0.8 0.7 0.7   * 701* 888*   * 926* 1,095*   PROTTOTAL 6.6* 6.2* 6.3*   ALBUMIN 2.3* 2.1* 2.1*       Iron Panel -   Recent Labs   Lab Test 11/16/20  0616 02/08/20  0408   IRON 16* 25*   IRONSAT 6* 8*   HEAVENLY 75 189       Current Medications:    allopurinol  200 mg Oral or Feeding Tube Daily     aspirin  81 mg Oral Daily     azithromycin  500 mg Intravenous Q24H     B and C vitamin Complex with folic acid  5 mL Per Feeding Tube Daily     ceFAZolin  2 g Intravenous Q12H     doxazosin  1 mg Oral Daily     finasteride  5 mg Oral Daily     FLUoxetine  30 mg Oral Daily     hydrALAZINE  25 mg Oral or Feeding Tube Q8H BRITTANI     insulin aspart  1-7 Units Subcutaneous TID AC     insulin aspart  1-5 Units Subcutaneous At Bedtime     omeprazole  20 mg Oral QAM AC     protein modular  1 packet Per Feeding Tube 4x Daily     senna-docusate  2 tablet Oral BID       [Held by provider] amiodarone       [Held by provider] bivalirudin ANTICOAGULANT (ANGIOMAX) 250mg/250mL in 0.9% Sodium Chloride Stopped (02/20/21 1800)     dextrose       dextrose Stopped (02/17/21 1600)     CRRT replacement solution 14 mL/kg/hr (02/21/21 1000)     DOBUTamine 3 mcg/kg/min (02/21/21 1056)     - MEDICATION INSTRUCTIONS -       CRRT replacement solution 200 mL/hr at 02/21/21 1000     CRRT replacement solution 12.5 mL/kg/hr (02/21/21 1000)     Warfarin Therapy Reminder       Pao Mabry MD

## 2021-02-22 ENCOUNTER — APPOINTMENT (OUTPATIENT)
Dept: CARDIOLOGY | Facility: CLINIC | Age: 66
DRG: 870 | End: 2021-02-22
Attending: INTERNAL MEDICINE
Payer: MEDICARE

## 2021-02-22 ENCOUNTER — APPOINTMENT (OUTPATIENT)
Dept: GENERAL RADIOLOGY | Facility: CLINIC | Age: 66
DRG: 870 | End: 2021-02-22
Attending: STUDENT IN AN ORGANIZED HEALTH CARE EDUCATION/TRAINING PROGRAM
Payer: MEDICARE

## 2021-02-22 ENCOUNTER — APPOINTMENT (OUTPATIENT)
Dept: OCCUPATIONAL THERAPY | Facility: CLINIC | Age: 66
DRG: 870 | End: 2021-02-22
Attending: INTERNAL MEDICINE
Payer: MEDICARE

## 2021-02-22 ENCOUNTER — APPOINTMENT (OUTPATIENT)
Dept: GENERAL RADIOLOGY | Facility: CLINIC | Age: 66
DRG: 870 | End: 2021-02-22
Payer: MEDICARE

## 2021-02-22 LAB
ALBUMIN SERPL-MCNC: 2.3 G/DL (ref 3.4–5)
ALBUMIN SERPL-MCNC: 2.4 G/DL (ref 3.4–5)
ALP SERPL-CCNC: 190 U/L (ref 40–150)
ALP SERPL-CCNC: 206 U/L (ref 40–150)
ALT SERPL W P-5'-P-CCNC: 181 U/L (ref 0–70)
ALT SERPL W P-5'-P-CCNC: 197 U/L (ref 0–70)
ANION GAP SERPL CALCULATED.3IONS-SCNC: 6 MMOL/L (ref 3–14)
ANION GAP SERPL CALCULATED.3IONS-SCNC: 8 MMOL/L (ref 3–14)
ANISOCYTOSIS BLD QL SMEAR: ABNORMAL
APTT PPP: 44 SEC (ref 22–37)
AST SERPL W P-5'-P-CCNC: 348 U/L (ref 0–45)
AST SERPL W P-5'-P-CCNC: 368 U/L (ref 0–45)
BASE DEFICIT BLDV-SCNC: 0.2 MMOL/L
BASE EXCESS BLDA CALC-SCNC: 0.4 MMOL/L
BASE EXCESS BLDV CALC-SCNC: 1.6 MMOL/L
BASOPHILS # BLD AUTO: 0 10E9/L (ref 0–0.2)
BASOPHILS NFR BLD AUTO: 0 %
BILIRUB SERPL-MCNC: 0.9 MG/DL (ref 0.2–1.3)
BILIRUB SERPL-MCNC: 0.9 MG/DL (ref 0.2–1.3)
BUN SERPL-MCNC: 30 MG/DL (ref 7–30)
BUN SERPL-MCNC: 30 MG/DL (ref 7–30)
BURR CELLS BLD QL SMEAR: SLIGHT
CA-I BLD-MCNC: 4.4 MG/DL (ref 4.4–5.2)
CALCIUM SERPL-MCNC: 8.1 MG/DL (ref 8.5–10.1)
CALCIUM SERPL-MCNC: 8.2 MG/DL (ref 8.5–10.1)
CHLORIDE SERPL-SCNC: 105 MMOL/L (ref 94–109)
CHLORIDE SERPL-SCNC: 106 MMOL/L (ref 94–109)
CO2 SERPL-SCNC: 21 MMOL/L (ref 20–32)
CO2 SERPL-SCNC: 24 MMOL/L (ref 20–32)
CREAT SERPL-MCNC: 1.91 MG/DL (ref 0.66–1.25)
CREAT SERPL-MCNC: 1.94 MG/DL (ref 0.66–1.25)
DIFFERENTIAL METHOD BLD: ABNORMAL
EOSINOPHIL # BLD AUTO: 0.1 10E9/L (ref 0–0.7)
EOSINOPHIL NFR BLD AUTO: 1 %
ERYTHROCYTE [DISTWIDTH] IN BLOOD BY AUTOMATED COUNT: 21.7 % (ref 10–15)
FACT X ACT/NOR PPP CHRO: 40 % (ref 70–130)
GFR SERPL CREATININE-BSD FRML MDRD: 35 ML/MIN/{1.73_M2}
GFR SERPL CREATININE-BSD FRML MDRD: 35 ML/MIN/{1.73_M2}
GLUCOSE BLDC GLUCOMTR-MCNC: 117 MG/DL (ref 70–99)
GLUCOSE BLDC GLUCOMTR-MCNC: 125 MG/DL (ref 70–99)
GLUCOSE BLDC GLUCOMTR-MCNC: 145 MG/DL (ref 70–99)
GLUCOSE BLDC GLUCOMTR-MCNC: 148 MG/DL (ref 70–99)
GLUCOSE SERPL-MCNC: 109 MG/DL (ref 70–99)
GLUCOSE SERPL-MCNC: 146 MG/DL (ref 70–99)
HCO3 BLD-SCNC: 24 MMOL/L (ref 21–28)
HCO3 BLDV-SCNC: 24 MMOL/L (ref 21–28)
HCO3 BLDV-SCNC: 26 MMOL/L (ref 21–28)
HCT VFR BLD AUTO: 29.1 % (ref 40–53)
HGB BLD-MCNC: 9.3 G/DL (ref 13.3–17.7)
INR PPP: 2.26 (ref 0.86–1.14)
L PNEUMO DNA SPEC QL NAA+PROBE: DETECTED
LACTATE BLD-SCNC: 0.9 MMOL/L (ref 0.7–2)
LACTATE BLD-SCNC: 0.9 MMOL/L (ref 0.7–2)
LACTATE BLD-SCNC: 1.2 MMOL/L (ref 0.7–2)
LACTATE BLD-SCNC: 1.3 MMOL/L (ref 0.7–2)
LDH SERPL L TO P-CCNC: 392 U/L (ref 85–227)
LEGIONELLA DNA SPEC NAA+PROBE: DETECTED
LYMPHOCYTES # BLD AUTO: 1.7 10E9/L (ref 0.8–5.3)
LYMPHOCYTES NFR BLD AUTO: 13 %
MAGNESIUM SERPL-MCNC: 2.5 MG/DL (ref 1.6–2.3)
MAGNESIUM SERPL-MCNC: 2.6 MG/DL (ref 1.6–2.3)
MCH RBC QN AUTO: 23.7 PG (ref 26.5–33)
MCHC RBC AUTO-ENTMCNC: 32 G/DL (ref 31.5–36.5)
MCV RBC AUTO: 74 FL (ref 78–100)
MONOCYTES # BLD AUTO: 0.4 10E9/L (ref 0–1.3)
MONOCYTES NFR BLD AUTO: 3 %
MYELOCYTES # BLD: 1.5 10E9/L
MYELOCYTES NFR BLD MANUAL: 11 %
NEUTROPHILS # BLD AUTO: 9.6 10E9/L (ref 1.6–8.3)
NEUTROPHILS NFR BLD AUTO: 72 %
NRBC # BLD AUTO: 0.1 10*3/UL
NRBC BLD AUTO-RTO: 1 /100
O2/TOTAL GAS SETTING VFR VENT: 21 %
OXYHGB MFR BLD: 92 % (ref 92–100)
OXYHGB MFR BLDV: 49 %
OXYHGB MFR BLDV: 53 %
PCO2 BLD: 34 MM HG (ref 35–45)
PCO2 BLDV: 34 MM HG (ref 40–50)
PCO2 BLDV: 40 MM HG (ref 40–50)
PH BLD: 7.46 PH (ref 7.35–7.45)
PH BLDV: 7.42 PH (ref 7.32–7.43)
PH BLDV: 7.45 PH (ref 7.32–7.43)
PHOSPHATE SERPL-MCNC: 4.4 MG/DL (ref 2.5–4.5)
PHOSPHATE SERPL-MCNC: 5.2 MG/DL (ref 2.5–4.5)
PLATELET # BLD AUTO: 233 10E9/L (ref 150–450)
PLATELET # BLD EST: ABNORMAL 10*3/UL
PO2 BLD: 70 MM HG (ref 80–105)
PO2 BLDV: 29 MM HG (ref 25–47)
PO2 BLDV: 30 MM HG (ref 25–47)
POIKILOCYTOSIS BLD QL SMEAR: SLIGHT
POLYCHROMASIA BLD QL SMEAR: SLIGHT
POTASSIUM SERPL-SCNC: 4.4 MMOL/L (ref 3.4–5.3)
POTASSIUM SERPL-SCNC: 4.7 MMOL/L (ref 3.4–5.3)
PROT SERPL-MCNC: 6.6 G/DL (ref 6.8–8.8)
PROT SERPL-MCNC: 7.2 G/DL (ref 6.8–8.8)
RBC # BLD AUTO: 3.92 10E12/L (ref 4.4–5.9)
SODIUM SERPL-SCNC: 135 MMOL/L (ref 133–144)
SODIUM SERPL-SCNC: 136 MMOL/L (ref 133–144)
SPECIMEN SOURCE: ABNORMAL
TARGETS BLD QL SMEAR: SLIGHT
WBC # BLD AUTO: 13.3 10E9/L (ref 4–11)

## 2021-02-22 PROCEDURE — 97530 THERAPEUTIC ACTIVITIES: CPT | Mod: GO

## 2021-02-22 PROCEDURE — 250N000011 HC RX IP 250 OP 636: Performed by: INTERNAL MEDICINE

## 2021-02-22 PROCEDURE — 999N001017 HC STATISTIC GLUCOSE BY METER IP

## 2021-02-22 PROCEDURE — 250N000009 HC RX 250: Performed by: INTERNAL MEDICINE

## 2021-02-22 PROCEDURE — 71045 X-RAY EXAM CHEST 1 VIEW: CPT | Mod: 26 | Performed by: STUDENT IN AN ORGANIZED HEALTH CARE EDUCATION/TRAINING PROGRAM

## 2021-02-22 PROCEDURE — 250N000011 HC RX IP 250 OP 636: Performed by: STUDENT IN AN ORGANIZED HEALTH CARE EDUCATION/TRAINING PROGRAM

## 2021-02-22 PROCEDURE — 200N000002 HC R&B ICU UMMC

## 2021-02-22 PROCEDURE — 80053 COMPREHEN METABOLIC PANEL: CPT | Performed by: INTERNAL MEDICINE

## 2021-02-22 PROCEDURE — 83735 ASSAY OF MAGNESIUM: CPT | Performed by: STUDENT IN AN ORGANIZED HEALTH CARE EDUCATION/TRAINING PROGRAM

## 2021-02-22 PROCEDURE — 71045 X-RAY EXAM CHEST 1 VIEW: CPT | Mod: 26 | Performed by: RADIOLOGY

## 2021-02-22 PROCEDURE — 93321 DOPPLER ECHO F-UP/LMTD STD: CPT | Mod: 26 | Performed by: INTERNAL MEDICINE

## 2021-02-22 PROCEDURE — 85260 CLOT FACTOR X STUART-POWER: CPT | Performed by: STUDENT IN AN ORGANIZED HEALTH CARE EDUCATION/TRAINING PROGRAM

## 2021-02-22 PROCEDURE — 99233 SBSQ HOSP IP/OBS HIGH 50: CPT | Mod: GC | Performed by: INTERNAL MEDICINE

## 2021-02-22 PROCEDURE — 999N000157 HC STATISTIC RCP TIME EA 10 MIN

## 2021-02-22 PROCEDURE — 80053 COMPREHEN METABOLIC PANEL: CPT | Performed by: STUDENT IN AN ORGANIZED HEALTH CARE EDUCATION/TRAINING PROGRAM

## 2021-02-22 PROCEDURE — 999N000155 HC STATISTIC RAPCV CVP MONITORING

## 2021-02-22 PROCEDURE — 84100 ASSAY OF PHOSPHORUS: CPT | Performed by: INTERNAL MEDICINE

## 2021-02-22 PROCEDURE — 93750 INTERROGATION VAD IN PERSON: CPT | Performed by: INTERNAL MEDICINE

## 2021-02-22 PROCEDURE — 250N000013 HC RX MED GY IP 250 OP 250 PS 637: Performed by: INTERNAL MEDICINE

## 2021-02-22 PROCEDURE — 71045 X-RAY EXAM CHEST 1 VIEW: CPT

## 2021-02-22 PROCEDURE — 93325 DOPPLER ECHO COLOR FLOW MAPG: CPT

## 2021-02-22 PROCEDURE — 97110 THERAPEUTIC EXERCISES: CPT | Mod: GO

## 2021-02-22 PROCEDURE — 99233 SBSQ HOSP IP/OBS HIGH 50: CPT | Mod: 25 | Performed by: INTERNAL MEDICINE

## 2021-02-22 PROCEDURE — 999N000015 HC STATISTIC ARTERIAL MONITORING DAILY

## 2021-02-22 PROCEDURE — 85610 PROTHROMBIN TIME: CPT | Performed by: STUDENT IN AN ORGANIZED HEALTH CARE EDUCATION/TRAINING PROGRAM

## 2021-02-22 PROCEDURE — 83735 ASSAY OF MAGNESIUM: CPT | Performed by: INTERNAL MEDICINE

## 2021-02-22 PROCEDURE — 90947 DIALYSIS REPEATED EVAL: CPT

## 2021-02-22 PROCEDURE — 82330 ASSAY OF CALCIUM: CPT | Performed by: STUDENT IN AN ORGANIZED HEALTH CARE EDUCATION/TRAINING PROGRAM

## 2021-02-22 PROCEDURE — 250N000013 HC RX MED GY IP 250 OP 250 PS 637: Performed by: STUDENT IN AN ORGANIZED HEALTH CARE EDUCATION/TRAINING PROGRAM

## 2021-02-22 PROCEDURE — 84100 ASSAY OF PHOSPHORUS: CPT | Performed by: STUDENT IN AN ORGANIZED HEALTH CARE EDUCATION/TRAINING PROGRAM

## 2021-02-22 PROCEDURE — 258N000003 HC RX IP 258 OP 636: Performed by: STUDENT IN AN ORGANIZED HEALTH CARE EDUCATION/TRAINING PROGRAM

## 2021-02-22 PROCEDURE — 93308 TTE F-UP OR LMTD: CPT | Mod: 26 | Performed by: INTERNAL MEDICINE

## 2021-02-22 PROCEDURE — 82805 BLOOD GASES W/O2 SATURATION: CPT | Performed by: STUDENT IN AN ORGANIZED HEALTH CARE EDUCATION/TRAINING PROGRAM

## 2021-02-22 PROCEDURE — 83615 LACTATE (LD) (LDH) ENZYME: CPT | Performed by: STUDENT IN AN ORGANIZED HEALTH CARE EDUCATION/TRAINING PROGRAM

## 2021-02-22 PROCEDURE — 93325 DOPPLER ECHO COLOR FLOW MAPG: CPT | Mod: 26 | Performed by: INTERNAL MEDICINE

## 2021-02-22 PROCEDURE — 83605 ASSAY OF LACTIC ACID: CPT | Performed by: STUDENT IN AN ORGANIZED HEALTH CARE EDUCATION/TRAINING PROGRAM

## 2021-02-22 PROCEDURE — 71045 X-RAY EXAM CHEST 1 VIEW: CPT | Mod: 77

## 2021-02-22 PROCEDURE — 85730 THROMBOPLASTIN TIME PARTIAL: CPT | Performed by: STUDENT IN AN ORGANIZED HEALTH CARE EDUCATION/TRAINING PROGRAM

## 2021-02-22 PROCEDURE — 85025 COMPLETE CBC W/AUTO DIFF WBC: CPT | Performed by: STUDENT IN AN ORGANIZED HEALTH CARE EDUCATION/TRAINING PROGRAM

## 2021-02-22 PROCEDURE — 99233 SBSQ HOSP IP/OBS HIGH 50: CPT | Performed by: INTERNAL MEDICINE

## 2021-02-22 RX ORDER — ACETAMINOPHEN 325 MG/1
325 TABLET ORAL EVERY 4 HOURS PRN
Status: DISCONTINUED | OUTPATIENT
Start: 2021-02-22 | End: 2021-02-22

## 2021-02-22 RX ORDER — CEFAZOLIN SODIUM 2 G/100ML
2 INJECTION, SOLUTION INTRAVENOUS DAILY
Status: COMPLETED | OUTPATIENT
Start: 2021-02-22 | End: 2021-03-01

## 2021-02-22 RX ORDER — WARFARIN SODIUM 2 MG/1
2 TABLET ORAL
Status: DISCONTINUED | OUTPATIENT
Start: 2021-02-22 | End: 2021-02-22

## 2021-02-22 RX ORDER — ALLOPURINOL 100 MG/1
100 TABLET ORAL DAILY
Status: DISCONTINUED | OUTPATIENT
Start: 2021-02-23 | End: 2021-03-17 | Stop reason: HOSPADM

## 2021-02-22 RX ORDER — BUMETANIDE 2 MG/1
4 TABLET ORAL ONCE
Status: DISCONTINUED | OUTPATIENT
Start: 2021-02-22 | End: 2021-02-22

## 2021-02-22 RX ORDER — BUMETANIDE 0.25 MG/ML
4 INJECTION INTRAMUSCULAR; INTRAVENOUS ONCE
Status: COMPLETED | OUTPATIENT
Start: 2021-02-22 | End: 2021-02-22

## 2021-02-22 RX ADMIN — FINASTERIDE 5 MG: 5 TABLET, FILM COATED ORAL at 07:46

## 2021-02-22 RX ADMIN — CALCIUM CHLORIDE, MAGNESIUM CHLORIDE, SODIUM CHLORIDE, SODIUM BICARBONATE, POTASSIUM CHLORIDE AND SODIUM PHOSPHATE DIBASIC DIHYDRATE 12.5 ML/KG/HR: 3.68; 3.05; 6.34; 3.09; .314; .187 INJECTION INTRAVENOUS at 06:47

## 2021-02-22 RX ADMIN — OMEPRAZOLE 20 MG: 20 CAPSULE, DELAYED RELEASE ORAL at 07:44

## 2021-02-22 RX ADMIN — HYDRALAZINE HYDROCHLORIDE 25 MG: 25 TABLET, FILM COATED ORAL at 06:05

## 2021-02-22 RX ADMIN — Medication 5 ML: at 08:01

## 2021-02-22 RX ADMIN — CALCIUM CHLORIDE, MAGNESIUM CHLORIDE, SODIUM CHLORIDE, SODIUM BICARBONATE, POTASSIUM CHLORIDE AND SODIUM PHOSPHATE DIBASIC DIHYDRATE 14 ML/KG/HR: 3.68; 3.05; 6.34; 3.09; .314; .187 INJECTION INTRAVENOUS at 06:05

## 2021-02-22 RX ADMIN — INSULIN ASPART 1 UNITS: 100 INJECTION, SOLUTION INTRAVENOUS; SUBCUTANEOUS at 13:04

## 2021-02-22 RX ADMIN — HYDRALAZINE HYDROCHLORIDE 25 MG: 25 TABLET, FILM COATED ORAL at 13:17

## 2021-02-22 RX ADMIN — CALCIUM CHLORIDE, MAGNESIUM CHLORIDE, SODIUM CHLORIDE, SODIUM BICARBONATE, POTASSIUM CHLORIDE AND SODIUM PHOSPHATE DIBASIC DIHYDRATE 14 ML/KG/HR: 3.68; 3.05; 6.34; 3.09; .314; .187 INJECTION INTRAVENOUS at 09:55

## 2021-02-22 RX ADMIN — CALCIUM CHLORIDE, MAGNESIUM CHLORIDE, SODIUM CHLORIDE, SODIUM BICARBONATE, POTASSIUM CHLORIDE AND SODIUM PHOSPHATE DIBASIC DIHYDRATE 14 ML/KG/HR: 3.68; 3.05; 6.34; 3.09; .314; .187 INJECTION INTRAVENOUS at 02:22

## 2021-02-22 RX ADMIN — TAMSULOSIN HYDROCHLORIDE 0.4 MG: 0.4 CAPSULE ORAL at 07:45

## 2021-02-22 RX ADMIN — DOCUSATE SODIUM 50 MG AND SENNOSIDES 8.6 MG 2 TABLET: 8.6; 5 TABLET, FILM COATED ORAL at 07:45

## 2021-02-22 RX ADMIN — FLUOXETINE 30 MG: 20 CAPSULE ORAL at 07:43

## 2021-02-22 RX ADMIN — HYDRALAZINE HYDROCHLORIDE 25 MG: 25 TABLET, FILM COATED ORAL at 21:39

## 2021-02-22 RX ADMIN — BUMETANIDE 4 MG: 0.25 INJECTION INTRAMUSCULAR; INTRAVENOUS at 14:08

## 2021-02-22 RX ADMIN — CALCIUM CHLORIDE, MAGNESIUM CHLORIDE, SODIUM CHLORIDE, SODIUM BICARBONATE, POTASSIUM CHLORIDE AND SODIUM PHOSPHATE DIBASIC DIHYDRATE 12.5 ML/KG/HR: 3.68; 3.05; 6.34; 3.09; .314; .187 INJECTION INTRAVENOUS at 02:46

## 2021-02-22 RX ADMIN — ASPIRIN 81 MG CHEWABLE TABLET 81 MG: 81 TABLET CHEWABLE at 07:42

## 2021-02-22 RX ADMIN — CALCIUM CHLORIDE, MAGNESIUM CHLORIDE, SODIUM CHLORIDE, SODIUM BICARBONATE, POTASSIUM CHLORIDE AND SODIUM PHOSPHATE DIBASIC DIHYDRATE 12.5 ML/KG/HR: 3.68; 3.05; 6.34; 3.09; .314; .187 INJECTION INTRAVENOUS at 10:44

## 2021-02-22 RX ADMIN — CEFAZOLIN SODIUM 2 G: 2 INJECTION, SOLUTION INTRAVENOUS at 19:41

## 2021-02-22 RX ADMIN — ALLOPURINOL 200 MG: 100 TABLET ORAL at 07:42

## 2021-02-22 RX ADMIN — AZITHROMYCIN MONOHYDRATE 500 MG: 500 INJECTION, POWDER, LYOPHILIZED, FOR SOLUTION INTRAVENOUS at 08:40

## 2021-02-22 RX ADMIN — DOBUTAMINE HYDROCHLORIDE 3 MCG/KG/MIN: 200 INJECTION INTRAVENOUS at 13:02

## 2021-02-22 RX ADMIN — CEFAZOLIN SODIUM 2 G: 2 INJECTION, SOLUTION INTRAVENOUS at 07:52

## 2021-02-22 ASSESSMENT — ACTIVITIES OF DAILY LIVING (ADL)
ADLS_ACUITY_SCORE: 13
ADLS_ACUITY_SCORE: 12
ADLS_ACUITY_SCORE: 13

## 2021-02-22 NOTE — PROGRESS NOTES
CRRT STATUS NOTE    DATA:  Time:  5:03 AM  Pressures WNL:  YES  Filter Status:  WDL    Problems Reported/Alarms Noted:  None    Supplies Present:  YES    ASSESSMENT:  Patient Net Fluid Balance:  +69 ml since midnight  Vital Signs:  Temp: 98.2  F (36.8  C) Temp src: Oral  Pulse: 115   Resp: 18 SpO2: 96 % O2 Device: None (Room air)      Labs:  Last Comprehensive Metabolic Panel:  Sodium   Date Value Ref Range Status   02/22/2021 136 133 - 144 mmol/L Final     Potassium   Date Value Ref Range Status   02/22/2021 4.7 3.4 - 5.3 mmol/L Final     Chloride   Date Value Ref Range Status   02/22/2021 106 94 - 109 mmol/L Final     Carbon Dioxide   Date Value Ref Range Status   02/22/2021 24 20 - 32 mmol/L Final     Anion Gap   Date Value Ref Range Status   02/22/2021 6 3 - 14 mmol/L Final     Glucose   Date Value Ref Range Status   02/22/2021 109 (H) 70 - 99 mg/dL Final     Urea Nitrogen   Date Value Ref Range Status   02/22/2021 30 7 - 30 mg/dL Final     Creatinine   Date Value Ref Range Status   02/22/2021 1.94 (H) 0.66 - 1.25 mg/dL Final     GFR Estimate   Date Value Ref Range Status   02/22/2021 35 (L) >60 mL/min/[1.73_m2] Final     Comment:     Non  GFR Calc  Starting 12/18/2018, serum creatinine based estimated GFR (eGFR) will be   calculated using the Chronic Kidney Disease Epidemiology Collaboration   (CKD-EPI) equation.       Calcium   Date Value Ref Range Status   02/22/2021 8.1 (L) 8.5 - 10.1 mg/dL Final      Lab Results   Component Value Date    WBC 13.3 02/22/2021     Lab Results   Component Value Date    RBC 3.92 02/22/2021     Lab Results   Component Value Date    HGB 9.3 02/22/2021     Lab Results   Component Value Date    HCT 29.1 02/22/2021     No components found for: MCT  Lab Results   Component Value Date    MCV 74 02/22/2021     Lab Results   Component Value Date    MCH 23.7 02/22/2021     Lab Results   Component Value Date    MCHC 32.0 02/22/2021     Lab Results   Component Value Date     RDW 21.7 02/22/2021     Lab Results   Component Value Date     02/22/2021        Goals of Therapy:  I=O    INTERVENTIONS:   None    PLAN:  Continue with plan of care and notify CRRT RN with any changes.

## 2021-02-22 NOTE — PLAN OF CARE
Major Shift Events:  Pt remains alert and oriented but a little forgetful at times.  Dobutamine continues at 3 mcg/kg/min.  RIJ cordis DC'd as well as LIJ dialysis catheter.  CRRT DC'd.  Pt voided 25 ml this morning, 4 mg Bumex given this afternoon.  Awaiting response.  LVAD numbers unremarkable, see flowsheet.  Up to chair x2 with lift, stood at bedside with assist of 2 and marched.  Wife and pt updated at bedside by RN and MD.   Plan: Anticipate tunneled dialysis catheter placement tomorrow with iHD thereafter.  Continue with plan of care.   For vital signs and complete assessments, please see documentation flowsheets.

## 2021-02-22 NOTE — PROGRESS NOTES
LVAD Social Work Services Progress Note      Date of Initial Social Work Evaluation: 2/16/2021  Collaborated with: Mi Leos, bedside RN, pt and his wife, Chapis, at bedside    Data: Pt with hx of LVAD implant 2/18/2020. Pt admitted 2/16/2021 from OSH for cardiogenic and septic shock and multiorgan failure. Pt was extubated 2/20. Pt having tunneled catheter tomorrow with iHD. Pt is currently assist of 2 with lift.   Discussed discharge planning with pt and his wife as there is a potential pt will require dialysis outpatient. Discussed complexities of outpatient dialysis for patient as he lives [place]hrs from the [facility] and there are currently no LVAD trained dialysis centers in their area. Wife reports that there is a [facility] outpatient dialysis center in [place], [place] , which is ~35 minutes from pt's home. Writer will reach out to this center to inquire if they would be willing to take referral and then would need to be LVAD trained.     Intervention: Supportive Visit, Discharge Planning   Assessment: Pt's wife relieved that pt is doing much better. Pt did not engage much with writer this visit. Wife expressing reasonable concern should pt require outpatient dialysis and not being able to find a dialysis center closer to their home that is LVAD trained. Normalized her feelings around stressful situation.   Education provided by SW: Ongoing Social Work support, outpatient dialysis for LVAD patients  Plan:    Discharge Plans in Progress: Writer will initiate outpatient dialysis referrals closer to pt's home tomorrow.     Barriers to d/c plan: Medical, locating appropriate outpatient dialysis center    Follow up Plan: Writer will continue to follow and provide support and work on discharge plan in anticipation pt will require outpatient dialysis

## 2021-02-22 NOTE — PLAN OF CARE
Alert/oriented. Painfree; resp effortless/even. CRRT patent; able to meet goals of therapy. See flow sheets for LVAD readings. Dobutamine at 3mcg/kg/min. Will continue to closely monitor.

## 2021-02-22 NOTE — PROGRESS NOTES
[facility]    Cardiology Progress Note- Cards 2        Date of Admission:  2/15/2021     Today's Plan  - Continue low dose dobutamine  - Continue broad spectrum antibiotics     -Cefazolin (2/18/21 -     )   -Azithromycin (2/15/21 -    )    Cefepime (2/16/21 - 2/18/21) (deescalated to cefazolin)    Vancomycin (2/15/21 - 2/18/21) (MRSA nares negative)    Piperacillin tazobactam (2/15/21-2/16/21) (switched for renal protection)  - ID on board, recs appreciated  - maintain euvolemic  - plan for TDC and IHD tomorrow AM per nephrology    Assessment & Plan: SL      Eliseo Tanner is a 65 year old male with PMHx relevant for NICM with LVEF 15-20%, NYHA III s/p HM III 2/18/2020 (post op complicated by retrosternal hematoma with bleeding in the lungs), s/p ICD placed on 3/16/2020, h/o MSSA driveline infection and bacteremia 11/5/2020 on prophylactic cephalexin, h/o VT on amiodarone, moderate nonobstructive CAD, severe MR, atrial fibrillation on warfarin, hypertension, ABHINAV requiring CPAP, CKD IV, DMII, HLP,  h/o HIT who presented to the Cardiovascular Unit (4E Cardiac ICU) with mixed cardiogenic and distributive shock withan intermittent wide complex tachycardia. Stabilized on low dose dobutamine off of pressors. Minimal oxygen requirements with persistent acute renal failure. Significantly improved mental status s/p extubation.     Cardiovascular:    #Mixed Cardiogenic and Septic Shock  #Multiorgan Failure   Presented from [facility] with subacute development of shortness of breath, dyspnea on exertion, dizziness/lightheadedness with near syncopal event found to be febrile with intermittent wide complex tachycardia. Recent COVID19 moderna vaccination. CT chest showing bilateral infiltrates. Community acquired pneumonia vs. Possible recurrent of MSSA bacteremia WBC 24.5, Procal 7.95, , Lactacte 6.9.    Mildly elevated filling pressures on  presentation CVP 18, PA 35/20, PCWP 11. Likely some component of cardiogenic shock. LVAD parameters relatively stable despite markedly elevated LDH 8,531. Euvolemic on exam. Worsening renal function, Cr 4.62, up-trending LFT's/ INR. Legionella antigen positive. Weaned off of vasopressor and maintained on dobutamine. Acute hepatic injury improving and now requiring dialysis. Shock appears to be resolved.     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 Bhatti (ID clinic)   # Troponin elevation (likely demand ischemia)  # Essential Hypertension  # Hyperlipidemia        Patient with long standing history of NICM previously implanted LVAD (HM III) on 02/18/20 due to worsening functional status and systolic ventricular dysfunction (further complicated by RV dysfunction by VAD hemodynamic compensatory mechanism, VT in ICU now on Amiodarone and Atrial Fibrillation with AVR requiring DCCV on 02/28/20).      Elevated cardiac biomarkers on presentation, SGC with only mildly elevated filling pressures. Euvolemic on exam. Troponin elevation likely related to demand ischemia with no concerns for ACS. Most recent VAD interrogation without any significant Flow-Alarms    LDH 8,531. Initial concern for LVAD thrombus. However given relatively stable LVAD parameters, degree of LDH elevation is out of proportion. Will continue to monitor for LVAD alarms.      - continue dobutamine  - Held ACE-I/ARB/ARNi: Lisinopril; due to worsening BERNARDA  - No BB; deferred 2/2 shock  - Aldosterone antagonist: deferred while other medical therapy is  optimized  - SCD prophylaxis: ICD  - Fluid status : euvolemic, maintain euvolemic with CRRT  - MAP Goal: 65-85  - On Warfarin for HM-III INR Goal 2-3  - Continue Hydralazine 25mg   - Continue ASA 81 mg per oral daily       # Wide complex tachycardia. Atrial Flutter vs Atrial flutter vs Ventricular Tachycardia  # History of Atrial Fibrillation s/p DCCV/history of Atrial Flutter       Wide complex tachycardia HR 140s on presentation in the setting of febrile illness and likely pneumonia. Did not initially respond to adenosine. Concern for VT. Intermittently back into HR 60s with underlying rhythm of atrial flutter 3:1 conduction block. Per EP can not rule out VT, may be dual tachycardias.      - hold Amiodarone gtt  - Ensure K+ >4.0 mEq/L and Mg+2 >2.0  - continue warfarin  - On cardiac telemetry    Pulmonary  #Hypoxemic Respiratory Failure  #Legionella Pneumonia  Hypoxemic in the setting of mixed cardiogenic septic shock requiring emergent intubation. O2 requirements quickly improved. S/p extubation on room air    Infectious disease    #Sepsis  #Legionella Pneumonia  #Bilateral pulmonary infiltrates  CT showing bilateral diffuse patchy consolidations concerning for infectious process. Low suspicion for recurrent driveline or possible hardware infection (history of MSSA Chronic Driveline Infection). Urine without pyuria. Urine legionella ag positive.   - f/u blood cultures  - continue Abx (Cefazolin and Azithromycin)  - ID on board, recs appreciated    Renal:  # Acute on Chronic CKD stage IV likely 2/2 Septic Shock   Creatinine continues uptrending 3.23 > 4.62 > 4.9. LIJ Dialysis line. Ongoing CRRT. No evidence of renal recovery  - continue CRRT  - Daily Weight's  - Strict I/O's  - Daily BMP's  - Avoid any additional nephrotoxicity      GI  # Shock Liver   # Congestive Hepatopathy  Hepatoccelular pattern of liver injury  > 3,496, AST 1,441 > 8,490 likely 2/2 to septic shock. INR downtrended s/p 3mg IV vit K.  RUQ US without portal vein thrombus. LFTs continue to downtrend.   - Trend LFT's   - continue Tube feeds  - continue bowel regimen     Hematological   # Chronic Microcytic/Hypochromic Anemia (likely related to iron deficiency)  # Coagulopathy related to sepsis   # IgG Kappa monoclonal Gammopathy of undetermined significance  - Monitor Hgb (baseline 8-9g/dL)  - transfuse for Hb < 7.0   - Patient follows with [facility] (Dr. Ibarra)     # Previous Concern For HIT  On previous hospitalization for LVAD placement (02/06/20-03/20/20), concern for HIT. 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    At that time, patient underwent two HIT panel tests (first one was negative and second antibody test was positive). No known thrombosis events. DAVINA antibody was negative raising question about validity of diagnosis . Per hematology at the time (Dr. James), no other cause for isolated thrombocytopenia. Immediate recovery of plts following d/c of heparin. 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     - avoiding heparin products  - continue warfarin     Endocrine  # Type II DM     > Last Hgb A1C: 6.8 (01/06/21)     - Continue PTA Insulin Basaglar 10 Units Aq at bedtime  - On Medium sliding scale insulin  - Oral Hypoglycemia Protocol      Neurologic:  # Disorientation/Toxic Metabolic Encephalopathy - Resolved  # Chronic Intracranial Small Vessel Disease        Patient reported some lightheadedness/dizziness and disorientation the days prior to hospitalization while at home. However, no reports of LOC, seizure activity, focal deficits, or findings for acute intracranial pathology on most recent OSH CT head w/o contrast (02/15/21). Mental status improved, s/p extubation      DVT Prophylaxis: therapeutic warfarin  Guevara Catheter: not present  Code Status: Full Code  Fluids: no IVFs  Lines: RIJ CVC (2/15),  L IJ dialysis catheter (), left radial arterial line (2/15)      Disposition Plan   Expected discharge: > 7 days, recommended to transitional care unit once fluid volume status optimized on oral medication, arrhythmia stabilized , therapeutic anticoagulation achieved and safe disposition plan/ TCU bed available.      Entered: Daniel Fleming MD 2021, 10:13 AM       The patient's care was discussed with the Attending Physician, Dr. Judd.    Daniel Fleming MD  [facility]  Please see sign in/sign out for up to date coverage information        I have reviewed today's vital signs, notes, medications, labs and imaging. I have also seen and examined the patient and agree with the findings and plan as outlined above.    VAD Interrogation today: VAD interrogation reviewed with housestaff. Agree with findings. No power spikes, speed drops, signficant PI events or other findings suspicious of pump malfunction noted.    Veronica Judd MD  Section Head - Advanced Heart Failure, Transplantation and Mechanical Circulatory Support  Director - [facility]  Associate Professor of Medicine, [facility]        Interval History   Overnight no acute events. Continued on CRRT.  Continued on low dose dobutamine. Dialysis catheter mispositioned and pulled out ~4cm. No longer functioning. Denies SOB, CP, fevers, chills.     Date CVP PA PCWP CO CI SVR SvO2   21 19 40/24 14 5.2 2.4 910 53%   21 8 34/16 12 4.6 2.15 991 48%   21 8 26/18 18 4.8 2.2     21 10 32/16 16 4.1 1.9 1090 38%   21 12           Heartmate 3 (centrifugal flow) VS  Flow (Lpm): 4.8 Lpm  Pulse Index (PI): 3.1 PI  Speed (rpm): 5500 rpm  Power (muhammad): 4.2 muhammad  Current Hct settin    Data reviewed today: I reviewed all medications, new labs and imaging results over the last 24 hours.      Physical Exam   Vital Signs: Temp: 97.7  F (36.5   C) Temp src: Axillary  Pulse: 113   Resp: 18 SpO2: 97 % O2 Device: None (Room air)    Weight: 210 lbs 5.1 oz    In general, the patient is awake alert and oriented, in no apparent distress.      Neck: LIJ hemodialysis catheters in place, sutures out and catheter has been misplaced by 3-4cm..   Heart: RRR. S1 and S2 no appreciated. Mechanical whir. No rub, click, or gallop.   Lungs: Bilateral rhonchi and rales   GI: Soft, nontender, nondistended.   Extremities: trace edema. The pulses are 2+at the radial and DP bilaterally.  Neuro: grossly non focal  Skin: no rashes.    Data   Recent Labs   Lab 02/22/21  0353 02/21/21  2145 02/21/21  1211 02/21/21  0335 02/20/21  0345 02/20/21  0345 02/15/21  1910 02/15/21  1910   WBC 13.3*  --   --  13.5*  --  11.6*   < > 24.5*   HGB 9.3*  --   --  8.4*  --  8.4*   < > 9.9*   MCV 74*  --   --  73*  --  73*   < > 74*     --   --  225  --  258   < > 356   INR 2.26*  --   --  2.21*  --  2.51*   < > 2.13*    135 134 135   < > 137   < > 129*   POTASSIUM 4.7 4.7 4.8 4.4   < > 4.2   < > 5.1   CHLORIDE 106 105 104 105   < > 107   < > 97   CO2 24 27 22 26   < > 26   < > 16*   BUN 30 31* 33* 30   < > 32*   < > 78*   CR 1.94* 1.85* 2.05* 1.85*   < > 2.00*   < > 3.23*   ANIONGAP 6 4 7 4   < > 5   < > 17*   CALOS 8.1* 8.1* 7.6* 8.4*   < > 8.2*   < > 8.7   * 138* 193* 94   < > 155*   < > 215*   ALBUMIN 2.3* 2.4* 2.2* 2.3*   < > 2.1*   < > 3.0*   PROTTOTAL 6.6* 7.1 6.5* 6.6*   < > 6.6*   < > 8.2   BILITOTAL 0.9 1.0 0.8 0.8   < > 0.9   < > 1.0   ALKPHOS 190* 209* 199* 196*   < > 207*   < > 141   * 256* 319* 448*   < > 1,200*   < > 768*   * 447* 540* 709*   < > 1,400*   < > 1,441*   TROPI  --   --   --   --   --   --   --  0.377*    < > = values in this interval not displayed.     TTE 2/15/2021  Interpretation Summary  HM 3 at 5500 RPM  LV size is small, LVIDd = 3.0 cm. Severely reduced left ventricular function.  Doppler of the inflow cannula and outflow graft are  normal.  RV is severely dilated. Severely reduced right ventricular function.  Mild TR is present.  The aortic valve is closed with mild regurgitation.  IVC is dilated and patient is on a ventilator.  No pericardial effusion present.

## 2021-02-22 NOTE — PLAN OF CARE
Major Shift Events: Alert, disoriented to place/situation. On 3 LPM NC. [place] pulled this evening, pt restarted on dobutamine @ 3. HM III in place, speed 5500, PI fluctuating from 1.8-8.9; MD aware. CRRT running, per Dr. Judd, pt -1 L by midnight. Bladder scanned for 9 mL. Hydralazine x1. No pain, eating well.    Plan: Continue to monitor hemodynamics, switch to HD this week    For vital signs and complete assessments, please see documentation flowsheets.

## 2021-02-22 NOTE — PROGRESS NOTES
[facility] ID Service: Follow Up Note      Patient:  Eliseo Tanner   Date of birth 1955, Medical record number [id number]  Date of Visit:  02/22/2021  Date of Admission: 2/15/2021         Assessment and Recommendations:   ID Problem List:  1. Mixed septic + cardiogenic shock with severe RV failure  2. Severe Legionella pneumophilia pneumonia (serogroup 1)  3. MSSA drive line infection  4. Oliguric BERNARDA on CKD requiring CRRT  5. Transaminitis, improving  6. Leukocytosis  7. 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 History of NICM s/p HM III (2/18/20), ICD placement (3/16/20)  9. Received 1st dose of the Moderna covid 19 vaccine on 2/11, scheduled for 2nd dose on 3/11/21 at home pharmacy     Recommendations:  1. Continue azithromycin 500mg IV daily (today is day #7/10)  2. Continue Cefazolin 2g IV q12hrs for MSSA driveline infection, plan for 14 days of IV therapy (today is day #7/14 of IV therapy)  - After completing 14 days of IV therapy, transition to PO Keflex (dose will be dependent on renal function at time of transition)  3. 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 CXR and CT chest was significant for bilateral consolidative opacities-right worse then left. Urine legionella antigen was positive for pneumophilia serogroup 1, sputum PCR also positive for legionella pneumophilia serogroup 1.  COVID 19, RVP and influenza testing negative. Supporting evidence for legionella pneumonia includes CT findings, hyponatremia, transaminitis, CRP elevation.   "Underlying risk factor for severity of disease is age, underlying cardiovascular and renal disease. Marked transaminitis on arrival, now down-trending; VZV and HSV negative. Light growth of E.faecium on sputum culture, likely colonizing as he is improving without treating and has risk factors for colonization.  Plan for 14 days of Azithromycin for Legionella pneumonia.     The driveline infection overall seems stable based on imaging and that the wife noted improved driveline drainage after increasing cephalexin dose ~ 1 week ago. No evidence of bacteremia. While in the hospital will keep on IV antibiotics for 14 days to get burden of infection down, then transition to PO Keflex.      Recs were discussed with primary team today. Don't hesitate to call with questions.     Attestation:  I have reviewed today's vital signs, medications, labs and imaging.  Anaid Redding PA-C, Pager # [phone]            Interval History:       Interval extubation. Afebrile. Sitting up in chair at time of visit. Feeling fairly well this morning. Reports non-productive cough. Sometimes feels \"winded\" when moving around. No nausea, vomiting, diarrhea, abdominal pain, chest pain. No peripheral edema.         Review of Systems:   Focused 5 point ROS obtained.          Current Antimicrobials   Current:  - Azithromycin (start 2/16)  - Cefazolin (start 2/18)    Prior:  - Cefepime (2/16-2/18)  - Zosyn (2/15-2/16)  - Vancomycin (2/15-2/17)        History of Present Illness:   Per initial ID consult on 2/16/21:  \"Patient is known to me from the outpatient setting. I last had a telephone visit with him on 2/4 for a ongoing drive line infection. At this time I increased his suppression cephalexin from 500 mg po q 12 hours to 500 mg po q 6 hours. On 2/9 he was seen by VAD coordinator and PA for a device check. Noted to have some drainage from the drive line exit site. Culture revealed MSSA. 2/8 CT a/p revealed stranding along the drive line in the " "subcutaneous tissue. No discrete fluid collections. Interesting, during this visit the patient felt very good.   Drainage from driveline improved after dose increase of cephalexin.      Received 1st dose of the Moderna covid 19 vaccine on 2/11. 2/12-2/14 noted to have general malaise, decreased appetite. During this time his wife took temp-~99. Noted he had a dry cough but it was not frequent. Patient did not complain of nausea, vomiting or diarrhea.      On the morning of 2/15 he fell walking in the bathroom. Initially went to [facility] and then transferred to [facility]. At [facility] he received a dose of azithromycin and ceftriaxone.  On admission noted to be febrile with a leukocytosis and lactic acidosis. After transfer started on empiric pip/tazo and vancomycin. CT chest revealed diffuse bilateral patchy groundglass consolidations (right worse then left), LAD, diffuse mesenteric edema and ascites. Overnight patient decompensated requiring intubation and initiation of CRRT. Urine legionella positive for pneumophilia serogroup 1 antigen. Influenza, RVP and covid 19 pcr negative.\"         Physical Exam:   Ranges for vital signs:  Temp:  [97.5  F (36.4  C)-98.2  F (36.8  C)] 97.7  F (36.5  C)  Pulse:  [107-117] 115  Resp:  [16-20] 18  MAP:  [58 mmHg-293 mmHg] 83 mmHg  Arterial Line BP: ()/() 89/67  SpO2:  [83 %-98 %] 97 %    Intake/Output Summary (Last 24 hours) at 2/17/2021 0847  Last data filed at 2/17/2021 0800  Gross per 24 hour   Intake 1715 ml   Output 3046 ml   Net -1331 ml     Exam:  GENERAL:  Awake, alert, interactive. Sitting up in chair.  ENT:  Head is normocephalic, atraumatic.  Oropharynx is moist.  EYES:  Eyes have anicteric sclerae, noninjected conjunctiva.    LUNGS:  Normal respiratory effort on RA. Bases diminished bilaterally.  CARDIOVASCULAR:  +LVAD hum.  ABDOMEN:  Soft, non-distended, +bowel sounds.  EXT: Extremities warm and without edema.  SKIN:  No acute rashes.  Left " internal jugular line in place without surrounding erythema or purulence.         Laboratory Data:   Reviewed.  Pertinent for:    Culture data:  Microbiology:  Culture Micro   Date Value Ref Range Status   02/17/2021 No growth after 5 days  Preliminary   02/17/2021 No growth after 5 days  Preliminary   02/16/2021 Light growth  Enterococcus faecium   (A)  Final   02/16/2021 Culture negative monitoring continues  Preliminary   02/15/2021 Moderate growth  Staphylococcus aureus   (A)  Final   02/15/2021 Moderate growth  Strain 2  Staphylococcus aureus   (A)  Final   02/15/2021 Moderate growth  Strain 3  Staphylococcus aureus   (A)  Final   02/15/2021 Light growth  Normal skin freeman    Final   02/15/2021 (A)  Final    Canceled, Test credited  >10 Squamous epithelial cells/low power field indicates oral contamination. Please   recollect.  Notification of test cancellation was given to  Bethanie Murillo RN on 2.15.21 at 2326. JRT     02/15/2021 No growth  Final   02/15/2021 No growth  Final   02/08/2021 No growth  Final   02/08/2021 No growth  Final   02/08/2021 Moderate growth  Staphylococcus aureus   (A)  Final   02/08/2021 Moderate growth  Strain 2  Staphylococcus aureus   (A)  Final   02/08/2021 No anaerobes isolated  Final   12/17/2020 No anaerobes isolated  Final   12/17/2020 Light growth  Staphylococcus aureus   (A)  Final   11/17/2020 No growth  Final   11/17/2020 No growth  Final   11/16/2020 No growth  Final   11/16/2020 No growth  Final   11/16/2020 Moderate growth  Staphylococcus aureus   (A)  Final   11/16/2020 Moderate growth  Strain 2  Staphylococcus aureus   (A)  Final   11/15/2020 No growth  Final   11/15/2020 No growth  Final   11/14/2020 (A)  Final    Cultured on the 1st day of incubation:  Staphylococcus aureus     11/14/2020   Final    Critical Value/Significant Value, preliminary result only, called to and read back by  ROSA ACE RN [id number] 11.15.20 NDP     11/14/2020   Final    (Note)  POSITIVE for  STAPHYLOCOCCUS AUREUS and NEGATIVE for the mecA gene  (not MRSA) by Verigene multiplex nucleic acid test. The mecA gene was  not detected. Final identification and antimicrobial susceptibility  testing will be verified by standard methods.    Specimen tested with Verigene multiplex, gram-positive blood culture  nucleic acid test for the following targets: Staph aureus, Staph  epidermidis, Staph lugdunensis, other Staph species, Enterococcus  faecalis, Enterococcus faecium, Streptococcus species, S. agalactiae,  S. anginosus grp., S. pneumoniae, S. pyogenes, Listeria sp., mecA  (methicillin resistance) and Ernst/B (vancomycin resistance).    Critical Value/Significant Value called to and read back by Sha Fontenot Rn [id number] 11.15.20 NDP     11/14/2020 (A)  Final    Cultured on the 1st day of incubation:  Staphylococcus aureus  Susceptibility testing done on previous specimen     11/14/2020   Final    Critical Value/Significant Value, preliminary result only, called to and read back by  PATRICIO SHANNON RN [id number] 11.15.20 NDP     09/21/2020 No growth  Final   09/21/2020 No growth  Final   03/13/2020 No growth  Final   03/13/2020 No growth  Final   03/03/2020 No growth  Final   03/03/2020 No growth  Final   02/22/2020 No growth  Final   02/22/2020 No growth  Final   02/16/2020 No growth  Final   02/16/2020 No growth  Final   02/15/2020 No growth  Final   02/15/2020 (A)  Final    Cultured on the 2nd day of incubation:  Staphylococcus epidermidis     02/15/2020   Final    Critical Value/Significant Value, preliminary result only, called to and read back by  Cee Benavidez RN on 2.16.20 at 2220.  JRT     02/15/2020   Final    (Note)  POSITIVE for STAPHYLOCOCCUS EPIDERMIDIS and POSITIVE for the mecA  gene (resistant to methicillin) by Lumicityigene multiplex nucleic acid  test. Final identification and antimicrobial susceptibility testing  will be verified by standard methods.    Specimen tested with [place] [employer],  gram-positive blood culture  nucleic acid test for the following targets: Staph aureus, Staph  epidermidis, Staph lugdunensis, other Staph species, Enterococcus  faecalis, Enterococcus faecium, Streptococcus species, S. agalactiae,  S. anginosus grp., S. pneumoniae, S. pyogenes, Listeria sp., mecA  (methicillin resistance) and Ernst/B (vancomycin resistance).    Critical Value/Significant Value called to and read back by Cee Benavidez RN, 2.17.20 @ 0207 pt.     02/14/2020 No growth  Final   02/13/2020 (A)  Final    Cultured on the 1st day of incubation:  Proteus mirabilis  Susceptibility testing done on previous specimen     02/13/2020   Final    Critical Value/Significant Value, preliminary result only, called to and read back by  Mima Cabrera RN. @1426. 2.13.20. .      02/13/2020 (A)  Final    Cultured on the 1st day of incubation:  Enterococcus faecalis  Susceptibility testing done on previous specimen     02/13/2020   Final    Critical Value/Significant Value, preliminary result only, called to and read back by  Alisson Castillo RN on 2.13.20 at 1728.  JRT     02/13/2020   Final    (Note)  POSITIVE for ENTEROCOCCUS FAECALIS and NEGATIVE for Ernst/vanB genes  by Verigene multiplex nucleic acid test. Final identification and  antimicrobial susceptibility testing will be verified by standard  methods.    Specimen tested with Verigene multiplex, gram-positive blood culture  nucleic acid test for the following targets: Staph aureus, Staph  epidermidis, Staph lugdunensis, other Staph species, Enterococcus  faecalis, Enterococcus faecium, Streptococcus species, S. agalactiae,  S. anginosus grp., S. pneumoniae, S. pyogenes, Listeria sp., mecA  (methicillin resistance) and Ernst/B (vancomycin resistance).    Critical Value/Significant Value called to and read back by MARIA EUGENIA MILLAN RN 2/13/20 2036          02/13/2020 (A)  Final    Cultured on the 1st day of incubation:  Proteus mirabilis     02/13/2020   Final     Critical Value/Significant Value, preliminary result only, called to and read back by  Mima Cabrera RN. @1426. 2.13.20. BS.      02/13/2020 (A)  Final    Cultured on the 1st day of incubation:  Enterococcus faecalis     02/13/2020   Final    Critical Value/Significant Value, preliminary result only, called to and read back by  Alisson Leon RN on 2.13.20 at 1728.  JRT     02/13/2020   Final    (Note)  POSITIVE for PROTEUS SPECIES by Vservigene multiplex nucleic acid test.  Final identification and antimicrobial susceptibility testing will be  verified by standard methods.    Specimen tested with Verigene multiplex, gram-negative blood culture  nucleic acid test for the following targets: Acinetobacter sp.,  Citrobacter sp., Enterobacter sp., Proteus sp., E. coli, K.  pneumoniae/oxytoca, P. aeruginosa, and the following resistance  markers: CTXM, KPC, NDM, VIM, IMP and OXA.    Critical Value/Significant Value called to and read back by  Alisson Leon RN @ 1650. 2/13/20. AV     02/13/2020 No growth  Final       Inflammatory Markers    Recent Labs   Lab Test 02/16/21  2219 02/15/21  1910 02/11/21  0838 12/17/20  0756   SED 72* 47*  --   --    .0* 270.0* 8.6 8.0       Hematology Studies    Recent Labs   Lab Test 02/22/21  0353 02/21/21  0335 02/20/21  0345 02/19/21  2210   WBC 13.3* 13.5* 11.6* 11.4*   HGB 9.3* 8.4* 8.4* 8.5*   MCV 74* 73* 73* 74*    225 258 241     Recent Labs   Lab Test 02/22/21  0353 02/21/21  0335 02/20/21  0345 02/19/21  0349   ANEU 9.6* 9.2* 9.9* 10.1*   AEOS 0.1 0.3 0.1 0.4       Metabolic Studies     Recent Labs   Lab Test 02/22/21  0353 02/21/21  2145 02/21/21  1211 02/21/21  0335    135 134 135   POTASSIUM 4.7 4.7 4.8 4.4   CHLORIDE 106 105 104 105   CO2 24 27 22 26   BUN 30 31* 33* 30   CR 1.94* 1.85* 2.05* 1.85*   GFRESTIMATED 35* 37* 32* 37*       Hepatic Studies    Recent Labs   Lab Test 02/22/21  0353 02/21/21  2145 02/21/21  1211   BILITOTAL 0.9 1.0 0.8    ALKPHOS 190* 209* 199*   ALBUMIN 2.3* 2.4* 2.2*   * 447* 540*   * 256* 319*            Imaging:   CXR (2/22/2021)  IMPRESSION:   1. Interval removal of right IJ Pe Ell-Chucky catheter. Additional support  devices are stable.  2. Low lung volumes with increased perihilar and bibasilar opacities.    CXR (2/17/2021)  Findings:   Right internal jugular Pe Ell-Chucky catheter tip projects over the right  main pulmonary artery. Left internal jugular central venous catheter  tip at the brachiocephalic confluence. Stable left chest wall  implantable cardiac defibrillator and LVAD. Median sternotomy wires.  Endotracheal tube tip at the mid to low thoracic trachea. Enteric tube  sidehole projects over the stomach. Stable enlarged cardiac  silhouette. The pulmonary vasculature is indistinct. Low lung volumes  with streaky perihilar and medial bibasilar opacities. Possible trace  bilateral pleural effusions. No pneumothorax.    CT Chest/abd/pelvis (2/16/21)  IMPRESSION:  1. Overall stable patchy consolidative pulmonary opacities, concerning  for pneumonia.  2. Stable prominent and borderline enlarged mediastinal lymph nodes,  favored to be reactive.  3. Mild increase in small bilateral pleural effusions, greater on the  right, with associated compressive atelectasis.  4. Overall stable LVAD drive line appearance in the superior abdominal  wall without discrete fluid collection or significant inflammation.  5. Mild increase in diffuse intra-abdominal ascites.    CT Chest/abd/pelvis (2/15/21)  IMPRESSION:   1. Diffuse patchy consolidations throughout both lungs concerning for  an acute infectious process. Interval increase in size of multiple  mediastinal lymph nodes measuring up to 10 mm, likely reactive.  2. Small right-sided pleural effusion and trace left-sided pleural  effusion.  3. Postsurgical changes of LVAD placement with mild inflammatory soft  tissue changes about the drive line traversing the subcutaneous  fat  and superior right rectus abdominis musculature, concerning for drive  line infection, similar to prior.   4. Diffuse mesenteric edema and small amount of intra-abdominal  ascites, new from prior. Fluid within the abdomen and pelvis measures  low density.  5. Mild diffuse bladder wall thickening, this can be seen with  cystitis. Recommend clinical correlation.

## 2021-02-22 NOTE — PROGRESS NOTES
CLINICAL NUTRITION SERVICES - BRIEF NOTE   *Please see full assessment note from 2/17/2021    New Findings:  Pt extubated 2/21, enteral access discontinued.     Diet advanced. Ordered calorie counts, as pt remains on CRRT.     Interventions  Calorie counts  Supplements: Trial of Boost Plus BID between meals.   Enteral Nutrition - discontinued orders    RD to follow per protocol.  Angelia Sal, MS, RD, LD, [facility]  [id number]  Pgr: [phone]

## 2021-02-22 NOTE — PROGRESS NOTES
Nephrology Progress Note  02/22/2021         Assessment & Recommendations:   64 yo M with PMHx  NICM  s/p HM III , VT, severe MR, atrial fibrillation on warfarin, hypertension, CKD IV, DMII, HIT presented to OSH with fevers and cough in the setting of syncopal episode transferred to [facility] for further cares. Hospitalization complicated by Afib with RVR with hypotension and intubation with upgrade of cares to the ICU. Found to be in septic shock 2/2 legionella pneumonia with possible cardiogenic shock. Nephrology was consulted for evaluation and management of anuric BERNARDA     Oligoanuric BERNARDA 2/2 ischemic ATN  Baseline Cr last yr s/p LVED placement was around 1. On admission ~2.3 and doubled within 24 hours with rapid decrease in UOP. UA unimpressive with baseline proteinuria and new granular casts. Initiated on RRT 2/16.   - TDC placement per IR  - Tentative plan for iHD tomorrow, 2/23  - Avoid nephrotoxins as able  - Renally dose meds  - Renal will continue to follow     Electrolytes - stable   Acid/base - stable   Anemia - Hb stable 9.3  Iron def in 11/2020.       Recommendations were communicated to primary team via note     Seen and discussed with Dr. Elisha Barry MD   [phone]    Interval History :   Nursing and provider notes from last 24 hours reviewed.  In the last 24 hours dialysis catheter has become dislodged so CRRT was stopped.   UOP only 25 ml,  however CVP now down to 12.  Pt has been extubated.      Denied being in pain or having SOB.  No CP, no abdominal pain.    Physical Exam:   I/O last 3 completed shifts:  In: 1839 [P.O.:925; I.V.:914]  Out: 2442 [Urine:25; Other:2417]   BP (!) 125/93   Pulse 111   Temp 98.8  F (37.1  C) (Axillary)   Resp 20   Wt 95.4 kg (210 lb 5.1 oz)   SpO2 97%   BMI 32.94 kg/m       General : Pt awake, not in acute distress   Lungs : anterior lung fields are clear  Cardiac : LVAD in place  Abdomen : Soft/ND/NT  LE : no Edema noted  Dialysis Access : TDC  placement today. right internal jugular coming off    Labs:   All labs reviewed by me  Electrolytes/Renal -   Recent Labs   Lab Test 02/22/21  1539 02/22/21  1001 02/22/21  0353 02/21/21  2145 02/21/21  1617 02/21/21  0335 02/21/21  0335   NA  --  135 136 135  --    < > 135   POTASSIUM  --  4.4 4.7 4.7  --    < > 4.4   CHLORIDE  --  105 106 105  --    < > 105   CO2  --  21 24 27  --    < > 26   BUN  --  30 30 31*  --    < > 30   CR  --  1.91* 1.94* 1.85*  --    < > 1.85*   GLC  --  146* 109* 138*  --    < > 94   CALOS  --  8.2* 8.1* 8.1*  --    < > 8.4*   MAG 2.6*  --  2.5*  --  2.6*  --  2.6*   PHOS  --   --  4.4  --  4.5  --  3.8    < > = values in this interval not displayed.       CBC -   Recent Labs   Lab Test 02/22/21  0353 02/21/21  0335 02/20/21  0345   WBC 13.3* 13.5* 11.6*   HGB 9.3* 8.4* 8.4*    225 258       LFTs -   Recent Labs   Lab Test 02/22/21  1001 02/22/21  0353 02/21/21  2145   ALKPHOS 206* 190* 209*   BILITOTAL 0.9 0.9 1.0   * 197* 256*   * 368* 447*   PROTTOTAL 7.2 6.6* 7.1   ALBUMIN 2.4* 2.3* 2.4*       Iron Panel -   Recent Labs   Lab Test 11/16/20  0616 02/08/20  0408   IRON 16* 25*   IRONSAT 6* 8*   HEAVENLY 75 189       Current Medications:    [START ON 2/23/2021] allopurinol  100 mg Oral or Feeding Tube Daily     aspirin  81 mg Oral Daily     azithromycin  500 mg Intravenous Q24H     B and C vitamin Complex with folic acid  5 mL Per Feeding Tube Daily     ceFAZolin  2 g Intravenous Daily     finasteride  5 mg Oral Daily     FLUoxetine  30 mg Oral Daily     hydrALAZINE  25 mg Oral or Feeding Tube Q8H [facility]     insulin aspart  1-7 Units Subcutaneous TID AC     insulin aspart  1-5 Units Subcutaneous At Bedtime     omeprazole  20 mg Oral QAM AC     protein modular  1 packet Per Feeding Tube 4x Daily     senna-docusate  2 tablet Oral BID     tamsulosin  0.4 mg Oral Daily     warfarin-No DOSE today  1 each Does not apply no dose today (warfarin)       [Held by provider] bivalirudin  ANTICOAGULANT (ANGIOMAX) 250mg/250mL in 0.9% Sodium Chloride Stopped (02/20/21 1800)     dextrose       dextrose Stopped (02/17/21 1600)     CRRT replacement solution 14 mL/kg/hr (02/22/21 0955)     DOBUTamine 3 mcg/kg/min (02/22/21 1500)     - MEDICATION INSTRUCTIONS -       CRRT replacement solution 200 mL/hr at 02/21/21 1000     CRRT replacement solution 12.5 mL/kg/hr (02/22/21 1044)     Warfarin Therapy Reminder       Jese Barry MD     I have seen and examined this patient with the resident.  This note reflects our joint assessment and plan.     Anila Tello MD    [phone]

## 2021-02-22 NOTE — CONSULTS
Interventional Radiology Consult Service Note    Patient is on IR schedule 2/23 for a TCVC placement.   Labs WNL for procedure. COVID neg   Orders for NPO, scrubs and antibiotics have been entered.   Consent will be done prior to procedure.     Please contact the IR charge RN at [phone] for estimated time of procedure.     Case discussed with cardiology. This is a 65 year old male with PMHx  NICM  s/p HM III , VT, severe MR, atrial fibrillation on warfarin, hypertension, CKD IV, DMII, HIT presented to OSH with fevers and cough in the setting of syncopal episode transferred to [facility] for further cares. Hospitalization complicated by Afib with RVR with hypotension and intubation with upgrade of cares to the ICU. Found to be in septic shock 2/2 legionella pneumonia with possible cardiogenic shock. Nephrology was consulted for evaluation and management of anuric BERNARDA. Patient has been on CRRT via NTCVC until 2/22 when the line became dislodged. Given likely long-term needs to HD access, IR is consulted for TCVC placement. Pt does have history of drive line infection for which he is being treated. No bacteremia.     Expected date of discharge: TBD    Vitals:   BP (!) 125/93   Pulse 114   Temp 97.7  F (36.5  C) (Axillary)   Resp 18   Wt 95.4 kg (210 lb 5.1 oz)   SpO2 97%   BMI 32.94 kg/m      Pertinent Labs:     Lab Results   Component Value Date    WBC 13.3 (H) 02/22/2021    WBC 13.5 (H) 02/21/2021    WBC 11.6 (H) 02/20/2021       Lab Results   Component Value Date    HGB 9.3 02/22/2021    HGB 8.4 02/21/2021    HGB 8.4 02/20/2021       Lab Results   Component Value Date     02/22/2021     02/21/2021     02/20/2021       Lab Results   Component Value Date    INR 2.26 (H) 02/22/2021    PTT 44 (H) 02/22/2021       Lab Results   Component Value Date    POTASSIUM 4.4 02/22/2021        JUAN Fung CNP  Interventional Radiology  Pager: [phone]

## 2021-02-22 NOTE — PROGRESS NOTES
Event Note:  [facility] dialysis access out 4 cm during shift report this morning and reported from outgoing RN that MD was aware.  After getting pt up with lift to chair the access was noted to be 6 cm out.  MD upated and STAT chest xray ordered, more secure suture requested by bedside RN.  Per MD, xray showed access was in adequate position.  RN again requested a suture in order to secure placement but by the time the MD arrived to place suture the access was 7 cm out and CRRT was alarming with access issues.  CRRT stopped, blood not flushed back.  Per Renal, pt can remain off CRRT and have tunelled catheter placed and transition to HD.  Pharmacy updated regarding change in therapy.

## 2021-02-23 ENCOUNTER — APPOINTMENT (OUTPATIENT)
Dept: INTERVENTIONAL RADIOLOGY/VASCULAR | Facility: CLINIC | Age: 66
DRG: 870 | End: 2021-02-23
Attending: NURSE PRACTITIONER
Payer: MEDICARE

## 2021-02-23 ENCOUNTER — APPOINTMENT (OUTPATIENT)
Dept: OCCUPATIONAL THERAPY | Facility: CLINIC | Age: 66
DRG: 870 | End: 2021-02-23
Attending: INTERNAL MEDICINE
Payer: MEDICARE

## 2021-02-23 LAB
ALBUMIN SERPL-MCNC: 2.2 G/DL (ref 3.4–5)
ALBUMIN SERPL-MCNC: 2.4 G/DL (ref 3.4–5)
ALBUMIN SERPL-MCNC: 2.5 G/DL (ref 3.4–5)
ALP SERPL-CCNC: 175 U/L (ref 40–150)
ALP SERPL-CCNC: 178 U/L (ref 40–150)
ALP SERPL-CCNC: 188 U/L (ref 40–150)
ALT SERPL W P-5'-P-CCNC: 102 U/L (ref 0–70)
ALT SERPL W P-5'-P-CCNC: 104 U/L (ref 0–70)
ALT SERPL W P-5'-P-CCNC: 80 U/L (ref 0–70)
ANION GAP SERPL CALCULATED.3IONS-SCNC: 8 MMOL/L (ref 3–14)
ANION GAP SERPL CALCULATED.3IONS-SCNC: 8 MMOL/L (ref 3–14)
ANION GAP SERPL CALCULATED.3IONS-SCNC: 9 MMOL/L (ref 3–14)
ANISOCYTOSIS BLD QL SMEAR: ABNORMAL
APTT PPP: 49 SEC (ref 22–37)
AST SERPL W P-5'-P-CCNC: 164 U/L (ref 0–45)
AST SERPL W P-5'-P-CCNC: 197 U/L (ref 0–45)
AST SERPL W P-5'-P-CCNC: 215 U/L (ref 0–45)
BACTERIA SPEC CULT: NO GROWTH
BACTERIA SPEC CULT: NO GROWTH
BASE DEFICIT BLDV-SCNC: 1.4 MMOL/L
BASOPHILS # BLD AUTO: 0 10E9/L (ref 0–0.2)
BASOPHILS NFR BLD AUTO: 0 %
BILIRUB SERPL-MCNC: 0.6 MG/DL (ref 0.2–1.3)
BILIRUB SERPL-MCNC: 0.7 MG/DL (ref 0.2–1.3)
BILIRUB SERPL-MCNC: 0.7 MG/DL (ref 0.2–1.3)
BUN SERPL-MCNC: 36 MG/DL (ref 7–30)
BUN SERPL-MCNC: 49 MG/DL (ref 7–30)
BUN SERPL-MCNC: 62 MG/DL (ref 7–30)
CALCIUM SERPL-MCNC: 7.9 MG/DL (ref 8.5–10.1)
CALCIUM SERPL-MCNC: 8.3 MG/DL (ref 8.5–10.1)
CALCIUM SERPL-MCNC: 8.4 MG/DL (ref 8.5–10.1)
CHLORIDE SERPL-SCNC: 101 MMOL/L (ref 94–109)
CHLORIDE SERPL-SCNC: 102 MMOL/L (ref 94–109)
CHLORIDE SERPL-SCNC: 102 MMOL/L (ref 94–109)
CO2 SERPL-SCNC: 22 MMOL/L (ref 20–32)
CO2 SERPL-SCNC: 23 MMOL/L (ref 20–32)
CO2 SERPL-SCNC: 26 MMOL/L (ref 20–32)
CREAT SERPL-MCNC: 2.95 MG/DL (ref 0.66–1.25)
CREAT SERPL-MCNC: 3.67 MG/DL (ref 0.66–1.25)
CREAT SERPL-MCNC: 4.28 MG/DL (ref 0.66–1.25)
DIFFERENTIAL METHOD BLD: ABNORMAL
EOSINOPHIL # BLD AUTO: 0.1 10E9/L (ref 0–0.7)
EOSINOPHIL NFR BLD AUTO: 0.9 %
ERYTHROCYTE [DISTWIDTH] IN BLOOD BY AUTOMATED COUNT: 22.4 % (ref 10–15)
FACT X ACT/NOR PPP CHRO: 31 % (ref 70–130)
GFR SERPL CREATININE-BSD FRML MDRD: 13 ML/MIN/{1.73_M2}
GFR SERPL CREATININE-BSD FRML MDRD: 16 ML/MIN/{1.73_M2}
GFR SERPL CREATININE-BSD FRML MDRD: 21 ML/MIN/{1.73_M2}
GLUCOSE BLDC GLUCOMTR-MCNC: 120 MG/DL (ref 70–99)
GLUCOSE BLDC GLUCOMTR-MCNC: 126 MG/DL (ref 70–99)
GLUCOSE BLDC GLUCOMTR-MCNC: 138 MG/DL (ref 70–99)
GLUCOSE BLDC GLUCOMTR-MCNC: 143 MG/DL (ref 70–99)
GLUCOSE SERPL-MCNC: 127 MG/DL (ref 70–99)
GLUCOSE SERPL-MCNC: 147 MG/DL (ref 70–99)
GLUCOSE SERPL-MCNC: 147 MG/DL (ref 70–99)
HCO3 BLDV-SCNC: 23 MMOL/L (ref 21–28)
HCT VFR BLD AUTO: 26 % (ref 40–53)
HGB BLD-MCNC: 8.2 G/DL (ref 13.3–17.7)
INR PPP: 2.6 (ref 0.86–1.14)
LACTATE BLD-SCNC: 0.6 MMOL/L (ref 0.7–2)
LACTATE BLD-SCNC: 1.2 MMOL/L (ref 0.7–2)
LACTATE BLD-SCNC: 1.4 MMOL/L (ref 0.7–2)
LACTATE BLD-SCNC: 1.6 MMOL/L (ref 0.7–2)
LDH SERPL L TO P-CCNC: 334 U/L (ref 85–227)
LYMPHOCYTES # BLD AUTO: 0.5 10E9/L (ref 0.8–5.3)
LYMPHOCYTES NFR BLD AUTO: 3.4 %
MCH RBC QN AUTO: 23.2 PG (ref 26.5–33)
MCHC RBC AUTO-ENTMCNC: 31.5 G/DL (ref 31.5–36.5)
MCV RBC AUTO: 73 FL (ref 78–100)
MICROCYTES BLD QL SMEAR: PRESENT
MONOCYTES # BLD AUTO: 1.4 10E9/L (ref 0–1.3)
MONOCYTES NFR BLD AUTO: 10.3 %
MYELOCYTES # BLD: 0.1 10E9/L
MYELOCYTES NFR BLD MANUAL: 0.9 %
NEUTROPHILS # BLD AUTO: 11.4 10E9/L (ref 1.6–8.3)
NEUTROPHILS NFR BLD AUTO: 84.5 %
O2/TOTAL GAS SETTING VFR VENT: 21 %
OXYHGB MFR BLDV: 50 %
PCO2 BLDV: 39 MM HG (ref 40–50)
PH BLDV: 7.39 PH (ref 7.32–7.43)
PLATELET # BLD AUTO: 249 10E9/L (ref 150–450)
PLATELET # BLD EST: ABNORMAL 10*3/UL
PO2 BLDV: 30 MM HG (ref 25–47)
POTASSIUM SERPL-SCNC: 4.1 MMOL/L (ref 3.4–5.3)
POTASSIUM SERPL-SCNC: 4.7 MMOL/L (ref 3.4–5.3)
POTASSIUM SERPL-SCNC: 5 MMOL/L (ref 3.4–5.3)
PROT SERPL-MCNC: 6.4 G/DL (ref 6.8–8.8)
PROT SERPL-MCNC: 6.5 G/DL (ref 6.8–8.8)
PROT SERPL-MCNC: 6.9 G/DL (ref 6.8–8.8)
RBC # BLD AUTO: 3.54 10E12/L (ref 4.4–5.9)
SODIUM SERPL-SCNC: 133 MMOL/L (ref 133–144)
SODIUM SERPL-SCNC: 133 MMOL/L (ref 133–144)
SODIUM SERPL-SCNC: 134 MMOL/L (ref 133–144)
SPECIMEN SOURCE: NORMAL
SPECIMEN SOURCE: NORMAL
TARGETS BLD QL SMEAR: ABNORMAL
WBC # BLD AUTO: 13.5 10E9/L (ref 4–11)

## 2021-02-23 PROCEDURE — 999N000157 HC STATISTIC RCP TIME EA 10 MIN

## 2021-02-23 PROCEDURE — 250N000009 HC RX 250: Performed by: RADIOLOGY

## 2021-02-23 PROCEDURE — 85260 CLOT FACTOR X STUART-POWER: CPT | Performed by: STUDENT IN AN ORGANIZED HEALTH CARE EDUCATION/TRAINING PROGRAM

## 2021-02-23 PROCEDURE — 999N000015 HC STATISTIC ARTERIAL MONITORING DAILY

## 2021-02-23 PROCEDURE — 250N000011 HC RX IP 250 OP 636: Performed by: STUDENT IN AN ORGANIZED HEALTH CARE EDUCATION/TRAINING PROGRAM

## 2021-02-23 PROCEDURE — 36558 INSERT TUNNELED CV CATH: CPT | Mod: GC | Performed by: RADIOLOGY

## 2021-02-23 PROCEDURE — 5A1D70Z PERFORMANCE OF URINARY FILTRATION, INTERMITTENT, LESS THAN 6 HOURS PER DAY: ICD-10-PCS | Performed by: NURSE PRACTITIONER

## 2021-02-23 PROCEDURE — C1750 CATH, HEMODIALYSIS,LONG-TERM: HCPCS

## 2021-02-23 PROCEDURE — 250N000013 HC RX MED GY IP 250 OP 250 PS 637: Performed by: STUDENT IN AN ORGANIZED HEALTH CARE EDUCATION/TRAINING PROGRAM

## 2021-02-23 PROCEDURE — 93750 INTERROGATION VAD IN PERSON: CPT | Performed by: INTERNAL MEDICINE

## 2021-02-23 PROCEDURE — 80053 COMPREHEN METABOLIC PANEL: CPT | Performed by: STUDENT IN AN ORGANIZED HEALTH CARE EDUCATION/TRAINING PROGRAM

## 2021-02-23 PROCEDURE — 76937 US GUIDE VASCULAR ACCESS: CPT

## 2021-02-23 PROCEDURE — 250N000009 HC RX 250: Performed by: STUDENT IN AN ORGANIZED HEALTH CARE EDUCATION/TRAINING PROGRAM

## 2021-02-23 PROCEDURE — 999N001017 HC STATISTIC GLUCOSE BY METER IP

## 2021-02-23 PROCEDURE — 97530 THERAPEUTIC ACTIVITIES: CPT | Mod: GO

## 2021-02-23 PROCEDURE — 77001 FLUOROGUIDE FOR VEIN DEVICE: CPT

## 2021-02-23 PROCEDURE — 0JH63XZ INSERTION OF TUNNELED VASCULAR ACCESS DEVICE INTO CHEST SUBCUTANEOUS TISSUE AND FASCIA, PERCUTANEOUS APPROACH: ICD-10-PCS | Performed by: RADIOLOGY

## 2021-02-23 PROCEDURE — 250N000011 HC RX IP 250 OP 636: Performed by: RADIOLOGY

## 2021-02-23 PROCEDURE — 76937 US GUIDE VASCULAR ACCESS: CPT | Mod: 26 | Performed by: RADIOLOGY

## 2021-02-23 PROCEDURE — 214N000001 HC R&B CCU UMMC

## 2021-02-23 PROCEDURE — C1769 GUIDE WIRE: HCPCS

## 2021-02-23 PROCEDURE — 272N000504 HC NEEDLE CR4

## 2021-02-23 PROCEDURE — 85025 COMPLETE CBC W/AUTO DIFF WBC: CPT | Performed by: STUDENT IN AN ORGANIZED HEALTH CARE EDUCATION/TRAINING PROGRAM

## 2021-02-23 PROCEDURE — 258N000003 HC RX IP 258 OP 636: Performed by: STUDENT IN AN ORGANIZED HEALTH CARE EDUCATION/TRAINING PROGRAM

## 2021-02-23 PROCEDURE — 90937 HEMODIALYSIS REPEATED EVAL: CPT

## 2021-02-23 PROCEDURE — 99233 SBSQ HOSP IP/OBS HIGH 50: CPT | Performed by: INTERNAL MEDICINE

## 2021-02-23 PROCEDURE — 83615 LACTATE (LD) (LDH) ENZYME: CPT | Performed by: STUDENT IN AN ORGANIZED HEALTH CARE EDUCATION/TRAINING PROGRAM

## 2021-02-23 PROCEDURE — 99152 MOD SED SAME PHYS/QHP 5/>YRS: CPT | Mod: GC | Performed by: RADIOLOGY

## 2021-02-23 PROCEDURE — 82805 BLOOD GASES W/O2 SATURATION: CPT | Performed by: STUDENT IN AN ORGANIZED HEALTH CARE EDUCATION/TRAINING PROGRAM

## 2021-02-23 PROCEDURE — 99232 SBSQ HOSP IP/OBS MODERATE 35: CPT | Mod: 25 | Performed by: INTERNAL MEDICINE

## 2021-02-23 PROCEDURE — 250N000011 HC RX IP 250 OP 636: Performed by: NURSE PRACTITIONER

## 2021-02-23 PROCEDURE — 99233 SBSQ HOSP IP/OBS HIGH 50: CPT | Mod: GC | Performed by: INTERNAL MEDICINE

## 2021-02-23 PROCEDURE — 250N000013 HC RX MED GY IP 250 OP 250 PS 637: Performed by: INTERNAL MEDICINE

## 2021-02-23 PROCEDURE — 90947 DIALYSIS REPEATED EVAL: CPT

## 2021-02-23 PROCEDURE — 83605 ASSAY OF LACTIC ACID: CPT | Performed by: STUDENT IN AN ORGANIZED HEALTH CARE EDUCATION/TRAINING PROGRAM

## 2021-02-23 PROCEDURE — 272N000602 HC WOUND GLUE CR1

## 2021-02-23 PROCEDURE — 93010 ELECTROCARDIOGRAM REPORT: CPT | Performed by: INTERNAL MEDICINE

## 2021-02-23 PROCEDURE — 85610 PROTHROMBIN TIME: CPT | Performed by: STUDENT IN AN ORGANIZED HEALTH CARE EDUCATION/TRAINING PROGRAM

## 2021-02-23 PROCEDURE — 99152 MOD SED SAME PHYS/QHP 5/>YRS: CPT

## 2021-02-23 PROCEDURE — 77001 FLUOROGUIDE FOR VEIN DEVICE: CPT | Mod: 26 | Performed by: RADIOLOGY

## 2021-02-23 PROCEDURE — 99153 MOD SED SAME PHYS/QHP EA: CPT

## 2021-02-23 PROCEDURE — 02H633Z INSERTION OF INFUSION DEVICE INTO RIGHT ATRIUM, PERCUTANEOUS APPROACH: ICD-10-PCS | Performed by: RADIOLOGY

## 2021-02-23 PROCEDURE — 85730 THROMBOPLASTIN TIME PARTIAL: CPT | Performed by: STUDENT IN AN ORGANIZED HEALTH CARE EDUCATION/TRAINING PROGRAM

## 2021-02-23 RX ORDER — FLUMAZENIL 0.1 MG/ML
0.2 INJECTION, SOLUTION INTRAVENOUS
Status: DISCONTINUED | OUTPATIENT
Start: 2021-02-23 | End: 2021-02-23 | Stop reason: HOSPADM

## 2021-02-23 RX ORDER — CEFAZOLIN SODIUM 2 G/100ML
2 INJECTION, SOLUTION INTRAVENOUS
Status: COMPLETED | OUTPATIENT
Start: 2021-02-23 | End: 2021-02-23

## 2021-02-23 RX ORDER — WARFARIN SODIUM 2 MG/1
2 TABLET ORAL
Status: COMPLETED | OUTPATIENT
Start: 2021-02-23 | End: 2021-02-23

## 2021-02-23 RX ORDER — NICOTINE POLACRILEX 4 MG
15-30 LOZENGE BUCCAL
Status: DISCONTINUED | OUTPATIENT
Start: 2021-02-23 | End: 2021-03-17 | Stop reason: HOSPADM

## 2021-02-23 RX ORDER — FENTANYL CITRATE 50 UG/ML
25-50 INJECTION, SOLUTION INTRAMUSCULAR; INTRAVENOUS EVERY 5 MIN PRN
Status: DISCONTINUED | OUTPATIENT
Start: 2021-02-23 | End: 2021-02-23 | Stop reason: HOSPADM

## 2021-02-23 RX ORDER — NALOXONE HYDROCHLORIDE 0.4 MG/ML
0.4 INJECTION, SOLUTION INTRAMUSCULAR; INTRAVENOUS; SUBCUTANEOUS
Status: DISCONTINUED | OUTPATIENT
Start: 2021-02-23 | End: 2021-02-23 | Stop reason: HOSPADM

## 2021-02-23 RX ORDER — NALOXONE HYDROCHLORIDE 0.4 MG/ML
0.2 INJECTION, SOLUTION INTRAMUSCULAR; INTRAVENOUS; SUBCUTANEOUS
Status: DISCONTINUED | OUTPATIENT
Start: 2021-02-23 | End: 2021-02-23 | Stop reason: HOSPADM

## 2021-02-23 RX ORDER — LANOLIN ALCOHOL/MO/W.PET/CERES
3 CREAM (GRAM) TOPICAL AT BEDTIME
Status: DISCONTINUED | OUTPATIENT
Start: 2021-02-23 | End: 2021-03-17 | Stop reason: HOSPADM

## 2021-02-23 RX ORDER — DEXTROSE MONOHYDRATE 25 G/50ML
25-50 INJECTION, SOLUTION INTRAVENOUS
Status: DISCONTINUED | OUTPATIENT
Start: 2021-02-23 | End: 2021-03-17 | Stop reason: HOSPADM

## 2021-02-23 RX ADMIN — TAMSULOSIN HYDROCHLORIDE 0.4 MG: 0.4 CAPSULE ORAL at 07:57

## 2021-02-23 RX ADMIN — ZOLPIDEM TARTRATE 5 MG: 5 TABLET, FILM COATED ORAL at 21:26

## 2021-02-23 RX ADMIN — OMEPRAZOLE 20 MG: 20 CAPSULE, DELAYED RELEASE ORAL at 07:57

## 2021-02-23 RX ADMIN — HYDRALAZINE HYDROCHLORIDE 25 MG: 25 TABLET, FILM COATED ORAL at 21:26

## 2021-02-23 RX ADMIN — ANTICOAGULANT CITRATE DEXTROSE SOLUTION FORMULA A 3 ML: 12.25; 11; 3.65 SOLUTION INTRAVENOUS at 17:38

## 2021-02-23 RX ADMIN — HYDRALAZINE HYDROCHLORIDE 25 MG: 25 TABLET, FILM COATED ORAL at 07:56

## 2021-02-23 RX ADMIN — AZITHROMYCIN MONOHYDRATE 500 MG: 500 INJECTION, POWDER, LYOPHILIZED, FOR SOLUTION INTRAVENOUS at 07:57

## 2021-02-23 RX ADMIN — CEFAZOLIN SODIUM 2 G: 2 INJECTION, SOLUTION INTRAVENOUS at 19:56

## 2021-02-23 RX ADMIN — ANTICOAGULANT CITRATE DEXTROSE SOLUTION FORMULA A 2.5 ML: 12.25; 11; 3.65 SOLUTION INTRAVENOUS at 13:34

## 2021-02-23 RX ADMIN — CEFAZOLIN SODIUM 2 G: 2 INJECTION, SOLUTION INTRAVENOUS at 13:03

## 2021-02-23 RX ADMIN — SODIUM CHLORIDE 250 ML: 9 INJECTION, SOLUTION INTRAVENOUS at 15:33

## 2021-02-23 RX ADMIN — HYDRALAZINE HYDROCHLORIDE 25 MG: 25 TABLET, FILM COATED ORAL at 14:39

## 2021-02-23 RX ADMIN — ASPIRIN 81 MG CHEWABLE TABLET 81 MG: 81 TABLET CHEWABLE at 07:57

## 2021-02-23 RX ADMIN — ALLOPURINOL 100 MG: 100 TABLET ORAL at 07:57

## 2021-02-23 RX ADMIN — MIDAZOLAM 1 MG: 1 INJECTION INTRAMUSCULAR; INTRAVENOUS at 13:13

## 2021-02-23 RX ADMIN — FENTANYL CITRATE 50 MCG: 50 INJECTION, SOLUTION INTRAMUSCULAR; INTRAVENOUS at 13:13

## 2021-02-23 RX ADMIN — FINASTERIDE 5 MG: 5 TABLET, FILM COATED ORAL at 07:56

## 2021-02-23 RX ADMIN — SODIUM CHLORIDE 300 ML: 9 INJECTION, SOLUTION INTRAVENOUS at 15:33

## 2021-02-23 RX ADMIN — DOBUTAMINE HYDROCHLORIDE 3 MCG/KG/MIN: 200 INJECTION INTRAVENOUS at 15:27

## 2021-02-23 RX ADMIN — FLUOXETINE 30 MG: 20 CAPSULE ORAL at 07:56

## 2021-02-23 RX ADMIN — ANTICOAGULANT CITRATE DEXTROSE SOLUTION FORMULA A 2.5 ML: 12.25; 11; 3.65 SOLUTION INTRAVENOUS at 13:35

## 2021-02-23 RX ADMIN — WARFARIN SODIUM 2 MG: 2 TABLET ORAL at 18:20

## 2021-02-23 RX ADMIN — INSULIN ASPART 1 UNITS: 100 INJECTION, SOLUTION INTRAVENOUS; SUBCUTANEOUS at 18:19

## 2021-02-23 RX ADMIN — LIDOCAINE HYDROCHLORIDE 8 ML: 10 INJECTION, SOLUTION EPIDURAL; INFILTRATION; INTRACAUDAL; PERINEURAL at 13:26

## 2021-02-23 ASSESSMENT — ACTIVITIES OF DAILY LIVING (ADL)
ADLS_ACUITY_SCORE: 14
ADLS_ACUITY_SCORE: 14
ADLS_ACUITY_SCORE: 15

## 2021-02-23 NOTE — PROGRESS NOTES
[facility] General ID Service: Follow Up Note      Patient:  Eliseo Tanner   Date of birth 1955, Medical record number [id number]  Date of Visit:  02/23/2021  Date of Admission: 2/15/2021         Assessment and Recommendations:   ID Problem List:  1. Mixed septic + cardiogenic shock with severe RV failure  2. Severe Legionella pneumophilia pneumonia (serogroup 1)  3. MSSA drive line infection  4. Oliguric BERNARDA on CKD requiring CRRT  5. Transaminitis, improving  6. Leukocytosis  7. 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 History of NICM s/p HM III (2/18/20), ICD placement (3/16/20)  9. Received 1st dose of the Moderna covid 19 vaccine on 2/11, scheduled for 2nd dose on 3/11/21 at home pharmacy     Recommendations:  1. Continue azithromycin 500mg IV daily (today is day #8/10)  2. Continue Cefazolin 2g IV q12hrs for MSSA driveline infection, plan for 14 days of IV therapy (today is day #8/14 of IV therapy)  - After completing 14 days of IV therapy, transition to PO Keflex (dose will be dependent on renal function at time of transition)  - If worsening drainage from driveline site: call ID (green team or Dr. Bhatti) for recs on extension of IV therapy  3. Follow up phone visit with ID (Dr. Bhatti) on 3/4/21  4. ID will sign off at this time, please don't hesitate to contact Denny monreal ID should further questions arise.      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 CXR and CT chest was significant for bilateral consolidative opacities-right worse then left. Urine legionella antigen was positive for pneumophilia serogroup 1,  "sputum PCR also positive for legionella pneumophilia serogroup 1.  COVID 19, RVP and influenza testing negative. Supporting evidence for legionella pneumonia includes CT findings, hyponatremia, transaminitis, CRP elevation.  Underlying risk factor for severity of disease is age, underlying cardiovascular and renal disease. Marked transaminitis on arrival, now down-trending; VZV and HSV negative. Light growth of E.faecium on sputum culture, likely colonizing as he is improving without treating and has risk factors for colonization.  Plan for 14 days of Azithromycin for Legionella pneumonia.     The driveline infection overall seems stable based on imaging and that the wife noted improved driveline drainage after increasing cephalexin dose ~ 1 week before admission. No evidence of bacteremia. While in the hospital will keep on IV antibiotics for 14 days to get burden of infection down, then transition to PO Keflex.      Recs were discussed with primary team today. Don't hesitate to call with questions.     Attestation:  I have reviewed today's vital signs, medications, labs and imaging.  Anaid Redding PA-C, Pager # [phone]            Interval History:      Shortness of breath overnight. This has improved this morning. Reports dry cough. No dyspnea. Sitting up in chair. No fever, chills, dizziness, nausea, vomiting, diarrhea, abd pain. Plan for dialysis line placement today by IR         Review of Systems:   Focused 5 point ROS obtained.          Current Antimicrobials   Current:  - Azithromycin (start 2/16)  - Cefazolin (start 2/18)    Prior:  - Cefepime (2/16-2/18)  - Zosyn (2/15-2/16)  - Vancomycin (2/15-2/17)        History of Present Illness:   Per initial ID consult on 2/16/21:  \"Patient is known to me from the outpatient setting. I last had a telephone visit with him on 2/4 for a ongoing drive line infection. At this time I increased his suppression cephalexin from 500 mg po q 12 hours to 500 mg po q 6 hours. On " "2/9 he was seen by VAD coordinator and PA for a device check. Noted to have some drainage from the drive line exit site. Culture revealed MSSA. 2/8 CT a/p revealed stranding along the drive line in the subcutaneous tissue. No discrete fluid collections. Interesting, during this visit the patient felt very good.   Drainage from driveline improved after dose increase of cephalexin.      Received 1st dose of the Moderna covid 19 vaccine on 2/11. 2/12-2/14 noted to have general malaise, decreased appetite. During this time his wife took temp-~99. Noted he had a dry cough but it was not frequent. Patient did not complain of nausea, vomiting or diarrhea.      On the morning of 2/15 he fell walking in the bathroom. Initially went to [facility] and then transferred to [facility]. At [facility] he received a dose of azithromycin and ceftriaxone.  On admission noted to be febrile with a leukocytosis and lactic acidosis. After transfer started on empiric pip/tazo and vancomycin. CT chest revealed diffuse bilateral patchy groundglass consolidations (right worse then left), LAD, diffuse mesenteric edema and ascites. Overnight patient decompensated requiring intubation and initiation of CRRT. Urine legionella positive for pneumophilia serogroup 1 antigen. Influenza, RVP and covid 19 pcr negative.\"         Physical Exam:   Ranges for vital signs:  Temp:  [97.9  F (36.6  C)-99.1  F (37.3  C)] 97.9  F (36.6  C)  Pulse:  [102-114] 102  Resp:  [11-31] 23  MAP:  [69 mmHg-100 mmHg] 82 mmHg  Arterial Line BP: ()/(58-86) 97/64  SpO2:  [94 %-97 %] 96 %    Intake/Output Summary (Last 24 hours) at 2/17/2021 0847  Last data filed at 2/17/2021 0800  Gross per 24 hour   Intake 1715 ml   Output 3046 ml   Net -1331 ml     Exam:  GENERAL:  Awake, alert, interactive. Sitting up in chair.  ENT:  Head is normocephalic, atraumatic.  Oropharynx is moist.  EYES:  Eyes have anicteric sclerae, noninjected conjunctiva.    LUNGS:  Normal " respiratory effort on RA. CTAB with bases diminished bilaterally.  CARDIOVASCULAR:  +LVAD hum.  ABDOMEN:  Soft, non-distended, +bowel sounds.  EXT: Extremities warm and without edema.  SKIN:  No acute rashes.           Laboratory Data:   Reviewed.  Pertinent for:    Culture data:  Microbiology:  Culture Micro   Date Value Ref Range Status   02/17/2021 No growth  Final   02/17/2021 No growth  Final   02/16/2021 Light growth  Enterococcus faecium   (A)  Final   02/16/2021 Culture negative monitoring continues  Preliminary   02/15/2021 Moderate growth  Staphylococcus aureus   (A)  Final   02/15/2021 Moderate growth  Strain 2  Staphylococcus aureus   (A)  Final   02/15/2021 Moderate growth  Strain 3  Staphylococcus aureus   (A)  Final   02/15/2021 Light growth  Normal skin freeman    Final   02/15/2021 (A)  Final    Canceled, Test credited  >10 Squamous epithelial cells/low power field indicates oral contamination. Please   recollect.  Notification of test cancellation was given to  Bethanie Murillo RN on 2.15.21 at 2326. JRT     02/15/2021 No growth  Final   02/15/2021 No growth  Final   02/08/2021 No growth  Final   02/08/2021 No growth  Final   02/08/2021 Moderate growth  Staphylococcus aureus   (A)  Final   02/08/2021 Moderate growth  Strain 2  Staphylococcus aureus   (A)  Final   02/08/2021 No anaerobes isolated  Final   12/17/2020 No anaerobes isolated  Final   12/17/2020 Light growth  Staphylococcus aureus   (A)  Final   11/17/2020 No growth  Final   11/17/2020 No growth  Final   11/16/2020 No growth  Final   11/16/2020 No growth  Final   11/16/2020 Moderate growth  Staphylococcus aureus   (A)  Final   11/16/2020 Moderate growth  Strain 2  Staphylococcus aureus   (A)  Final   11/15/2020 No growth  Final   11/15/2020 No growth  Final   11/14/2020 (A)  Final    Cultured on the 1st day of incubation:  Staphylococcus aureus     11/14/2020   Final    Critical Value/Significant Value, preliminary result only, called to and  read back by  ROSA ACE RN 1553 11.15.20 [facility]     11/14/2020   Final    (Note)  POSITIVE for STAPHYLOCOCCUS AUREUS and NEGATIVE for the mecA gene  (not MRSA) by Verigene multiplex nucleic acid test. The mecA gene was  not detected. Final identification and antimicrobial susceptibility  testing will be verified by standard methods.    Specimen tested with Verigene multiplex, gram-positive blood culture  nucleic acid test for the following targets: Staph aureus, Staph  epidermidis, Staph lugdunensis, other Staph species, Enterococcus  faecalis, Enterococcus faecium, Streptococcus species, S. agalactiae,  S. anginosus grp., S. pneumoniae, S. pyogenes, Listeria sp., mecA  (methicillin resistance) and Ernst/B (vancomycin resistance).    Critical Value/Significant Value called to and read back by Rosa Ace Rn [id number] 11.15.20 [facility]     11/14/2020 (A)  Final    Cultured on the 1st day of incubation:  Staphylococcus aureus  Susceptibility testing done on previous specimen     11/14/2020   Final    Critical Value/Significant Value, preliminary result only, called to and read back by  PATRICIO SHANNON RN 2101 11.15.20 [facility]     09/21/2020 No growth  Final   09/21/2020 No growth  Final   03/13/2020 No growth  Final   03/13/2020 No growth  Final   03/03/2020 No growth  Final   03/03/2020 No growth  Final   02/22/2020 No growth  Final   02/22/2020 No growth  Final   02/16/2020 No growth  Final   02/16/2020 No growth  Final   02/15/2020 No growth  Final   02/15/2020 (A)  Final    Cultured on the 2nd day of incubation:  Staphylococcus epidermidis     02/15/2020   Final    Critical Value/Significant Value, preliminary result only, called to and read back by  Cee Benavidez RN on 2.16.20 at 2220.  JRT     02/15/2020   Final    (Note)  POSITIVE for STAPHYLOCOCCUS EPIDERMIDIS and POSITIVE for the mecA  gene (resistant to methicillin) by Verigene multiplex nucleic acid  test. Final identification and antimicrobial susceptibility  testing  will be verified by standard methods.    Specimen tested with Verigene multiplex, gram-positive blood culture  nucleic acid test for the following targets: Staph aureus, Staph  epidermidis, Staph lugdunensis, other Staph species, Enterococcus  faecalis, Enterococcus faecium, Streptococcus species, S. agalactiae,  S. anginosus grp., S. pneumoniae, S. pyogenes, Listeria sp., mecA  (methicillin resistance) and Ernst/B (vancomycin resistance).    Critical Value/Significant Value called to and read back by Cee Benavidez RN, 2.17.20 @ 0207 pt.     02/14/2020 No growth  Final   02/13/2020 (A)  Final    Cultured on the 1st day of incubation:  Proteus mirabilis  Susceptibility testing done on previous specimen     02/13/2020   Final    Critical Value/Significant Value, preliminary result only, called to and read back by  Mima Cabrera RN. @1426. 2.13.20. .      02/13/2020 (A)  Final    Cultured on the 1st day of incubation:  Enterococcus faecalis  Susceptibility testing done on previous specimen     02/13/2020   Final    Critical Value/Significant Value, preliminary result only, called to and read back by  Alisson Castillo RN on 2.13.20 at 1728.  JRT     02/13/2020   Final    (Note)  POSITIVE for ENTEROCOCCUS FAECALIS and NEGATIVE for Ernst/vanB genes  by Verigene multiplex nucleic acid test. Final identification and  antimicrobial susceptibility testing will be verified by standard  methods.    Specimen tested with Verigene multiplex, gram-positive blood culture  nucleic acid test for the following targets: Staph aureus, Staph  epidermidis, Staph lugdunensis, other Staph species, Enterococcus  faecalis, Enterococcus faecium, Streptococcus species, S. agalactiae,  S. anginosus grp., S. pneumoniae, S. pyogenes, Listeria sp., mecA  (methicillin resistance) and Ernst/B (vancomycin resistance).    Critical Value/Significant Value called to and read back by MARIA EUGENIA MILLAN RN 2/13/20 2036 [facility]         02/13/2020 (A)   Final    Cultured on the 1st day of incubation:  Proteus mirabilis     02/13/2020   Final    Critical Value/Significant Value, preliminary result only, called to and read back by  Mima Cabrera RN. @1426. 2.13.20. BS.      02/13/2020 (A)  Final    Cultured on the 1st day of incubation:  Enterococcus faecalis     02/13/2020   Final    Critical Value/Significant Value, preliminary result only, called to and read back by  Alisson Leon RN on 2.13.20 at 1728.  JRT     02/13/2020   Final    (Note)  POSITIVE for PROTEUS SPECIES by [employer]ig[employer] multiplex nucleic acid test.  Final identification and antimicrobial susceptibility testing will be  verified by standard methods.    Specimen tested with Verigene multiplex, gram-negative blood culture  nucleic acid test for the following targets: Acinetobacter sp.,  Citrobacter sp., Enterobacter sp., Proteus sp., E. coli, K.  pneumoniae/oxytoca, P. aeruginosa, and the following resistance  markers: CTXM, KPC, NDM, VIM, IMP and OXA.    Critical Value/Significant Value called to and read back by  Alisson Leon RN @ 1650. 2/13/20. AV     02/13/2020 No growth  Final       Inflammatory Markers    Recent Labs   Lab Test 02/16/21  2219 02/15/21  1910 02/11/21  0838 12/17/20  0756   SED 72* 47*  --   --    .0* 270.0* 8.6 8.0       Hematology Studies    Recent Labs   Lab Test 02/23/21  0311 02/22/21  0353 02/21/21  0335 02/20/21  0345   WBC 13.5* 13.3* 13.5* 11.6*   HGB 8.2* 9.3* 8.4* 8.4*   MCV 73* 74* 73* 73*    233 225 258     Recent Labs   Lab Test 02/23/21  0311 02/22/21  0353 02/21/21  0335 02/20/21  0345   ANEU 11.4* 9.6* 9.2* 9.9*   AEOS 0.1 0.1 0.3 0.1       Metabolic Studies     Recent Labs   Lab Test 02/23/21  0311 02/22/21  1001 02/22/21  0353 02/21/21  2145    135 136 135   POTASSIUM 4.7 4.4 4.7 4.7   CHLORIDE 102 105 106 105   CO2 23 21 24 27   BUN 49* 30 30 31*   CR 3.67* 1.91* 1.94* 1.85*   GFRESTIMATED 16* 35* 35* 37*       Hepatic Studies     Recent Labs   Lab Test 02/23/21  0311 02/22/21  1001 02/22/21  0353   BILITOTAL 0.7 0.9 0.9   ALKPHOS 178* 206* 190*   ALBUMIN 2.2* 2.4* 2.3*   * 348* 368*   * 181* 197*            Imaging:   CXR (2/22/2021)  IMPRESSION:   1. Interval removal of right IJ Artemus-Chucky catheter. Additional support  devices are stable.  2. Low lung volumes with increased perihilar and bibasilar opacities.    CXR (2/17/2021)  Findings:   Right internal jugular Artemus-Chucky catheter tip projects over the right  main pulmonary artery. Left internal jugular central venous catheter  tip at the brachiocephalic confluence. Stable left chest wall  implantable cardiac defibrillator and LVAD. Median sternotomy wires.  Endotracheal tube tip at the mid to low thoracic trachea. Enteric tube  sidehole projects over the stomach. Stable enlarged cardiac  silhouette. The pulmonary vasculature is indistinct. Low lung volumes  with streaky perihilar and medial bibasilar opacities. Possible trace  bilateral pleural effusions. No pneumothorax.    CT Chest/abd/pelvis (2/16/21)  IMPRESSION:  1. Overall stable patchy consolidative pulmonary opacities, concerning  for pneumonia.  2. Stable prominent and borderline enlarged mediastinal lymph nodes,  favored to be reactive.  3. Mild increase in small bilateral pleural effusions, greater on the  right, with associated compressive atelectasis.  4. Overall stable LVAD drive line appearance in the superior abdominal  wall without discrete fluid collection or significant inflammation.  5. Mild increase in diffuse intra-abdominal ascites.    CT Chest/abd/pelvis (2/15/21)  IMPRESSION:   1. Diffuse patchy consolidations throughout both lungs concerning for  an acute infectious process. Interval increase in size of multiple  mediastinal lymph nodes measuring up to 10 mm, likely reactive.  2. Small right-sided pleural effusion and trace left-sided pleural  effusion.  3. Postsurgical changes of LVAD placement  with mild inflammatory soft  tissue changes about the drive line traversing the subcutaneous fat  and superior right rectus abdominis musculature, concerning for drive  line infection, similar to prior.   4. Diffuse mesenteric edema and small amount of intra-abdominal  ascites, new from prior. Fluid within the abdomen and pelvis measures  low density.  5. Mild diffuse bladder wall thickening, this can be seen with  cystitis. Recommend clinical correlation.

## 2021-02-23 NOTE — PLAN OF CARE
[id number]  Pt is alert and oriented x4, on RA, dobutamine @ 3mcg/kg/min, no void this evening, LVAD parameters unchanged, up in the chair this evening with lift. 2 gram Na diet.   Plan: Tunneled HD line placement tomorrow, possible HD.

## 2021-02-23 NOTE — PROGRESS NOTES
Care Management Follow Up    Length of Stay (days): 8    Expected Discharge Date:  TBD     Patient plan of care discussed at interdisciplinary rounds: Yes    Anticipated Discharge Disposition:  TBD     Anticipated Discharge Services:  TBD  Anticipated Discharge DME:  [email or web address]    Additional Information:  Pt is with hx of LVAD on 2/18/2020, transferred from OSH with cardiogenic and septic shock.  Pt extubated on 2/20.  Per morning report and chart review, plan to have Tunneled HD line placement done today and initiate iHD.  Visited pt and spouse for support.  Pt was busy with other provider and visited only with pt spouse.  Pt spouse stated the team have been updating them well about the plan of care.  RNCC will cont to follow plan of care.      Brady Medley RN, PHN, BSN  4A and 4E/ ICU  Care Coordinator  Phone: [phone]  Pager: [phone]

## 2021-02-23 NOTE — PROCEDURES
[facility]    Procedure: IR Procedure Note    Date/Time: 2/23/2021 1:45 PM  Performed by: Salbador Munoz DO  Authorized by: Salbador Munoz DO   IR Fellow Physician: Alexander    UNIVERSAL PROTOCOL   Site Marked: NA  Prior Images Obtained and Reviewed:  Yes  Required items: Required blood products, implants, devices and special equipment available    Patient identity confirmed:  Verbally with patient, arm band, provided demographic data and hospital-assigned identification number  Patient was reevaluated immediately before administering moderate or deep sedation or anesthesia  Confirmation Checklist:  Patient's identity using two indicators, relevant allergies, procedure was appropriate and matched the consent or emergent situation and correct equipment/implants were available  Time out: Immediately prior to the procedure a time out was called    Universal Protocol: the Joint Commission Universal Protocol was followed    Preparation: Patient was prepped and draped in usual sterile fashion           ANESTHESIA    Anesthesia: Local infiltration  Local Anesthetic:  Lidocaine 1% without epinephrine  Anesthetic Total (mL):  12      SEDATION    Patient Sedated: Yes    Sedation Type:  Moderate (conscious) sedation  Sedation:  Fentanyl and midazolam  Vital signs: Vital signs monitored during sedation    See dictated procedure note for full details.  Findings: Patient is status post placement of dual-lumen 14.5 German palindrome dialysis catheter in the right IJ.    Specimens: none    Complications: None    Condition: Stable    Plan: Catheter is locked with anticoagulant citrate due to heparin allergy.  Catheter is ready for immediate use.    PROCEDURE   Patient Tolerance:  Patient tolerated the procedure well with no immediate complications    Length of time physician/provider present for 1:1 monitoring during sedation: 25

## 2021-02-23 NOTE — PROGRESS NOTES
.    Nephrology Progress Note  02/23/2021         Assessment & Recommendations:   66 yo M with PMHx  NICM  s/p HM III , VT, severe MR, atrial fibrillation on warfarin, hypertension, CKD IV, DMII, HIT presented to OSH with fevers and cough in the setting of syncopal episode transferred to [facility] for further cares. Hospitalization complicated by Afib with RVR with hypotension and intubation with upgrade of cares to the ICU. Found to be in septic shock 2/2 legionella pneumonia with possible cardiogenic shock. Nephrology was consulted for evaluation and management of anuric BERNARDA     Oligoanuric BERNARDA 2/2 ischemic ATN  Baseline Cr last yr s/p LVED placement was around 1. On admission ~2.3 and doubled within 24 hours with rapid decrease in UOP. UA unimpressive with baseline proteinuria and new granular casts. Initiated on RRT 2/16.  Increased UOP ON.  - TDC placement per IR  - Plan for iHD on 2/23 with even net I/O   - monitor for recovery of renal function  - Avoid nephrotoxins as able  - Renally dose meds  - Renal will continue to follow    Volume status:   Euvolemic.       Electrolytes - stable   Acid/base - stable   Anemia - Hb stable 8.2 Iron def in 11/2020.       Recommendations were communicated to primary team via note     Seen and discussed with Dr. Elisha Barry MD   [phone]    Interval History :   Nursing and provider notes from last 24 hours reviewed.    No major acute overnight events. Has made UOP ~ 350 ml overnight. Continues to be on dobutamine drips.    Planning to have TDC placed and can initiate iHD later today    Review of Systems:   I reviewed the following systems:  GI: No nausea or vomiting or diarrhea.   Neuro:  No confusion  Constitutional:  No fever or chills  CV: No dyspnea or edema.    Physical Exam:   I/O last 3 completed shifts:  In: 1157 [P.O.:480; I.V.:677]  Out: 947 [Urine:350; Other:597]   BP (!) 105/98 (BP Location: Left arm)   Pulse 103   Temp 97.9  F (36.6  C) (Oral)   Resp  26   Wt 95.7 kg (210 lb 15.7 oz)   SpO2 100%   BMI 33.04 kg/m       General : Pt awake, not in acute distress   Lungs : anterior lung fields are clear  Cardiac : LVAD in place  Abdomen : Soft/ND/NT  LE : no Edema noted  Dialysis Access : TDC placement today    Labs:   All labs reviewed by me  Electrolytes/Renal -   Recent Labs   Lab Test 02/23/21  1201 02/23/21  0311 02/22/21  1539 02/22/21  1001 02/22/21  0353 02/21/21  1617 02/21/21  1617    133  --  135 136   < >  --    POTASSIUM 5.0 4.7  --  4.4 4.7   < >  --    CHLORIDE 102 102  --  105 106   < >  --    CO2 22 23 -- 21 24   < >  --    BUN 62* 49*  --  30 30   < >  --    CR 4.28* 3.67*  --  1.91* 1.94*   < >  --    * 127*  --  146* 109*   < >  --    CALOS 7.9* 8.4*  --  8.2* 8.1*   < >  --    MAG  --   --  2.6*  --  2.5*  --  2.6*   PHOS  --   --  5.2*  --  4.4  --  4.5    < > = values in this interval not displayed.       CBC -   Recent Labs   Lab Test 02/23/21  0311 02/22/21  0353 02/21/21  0335   WBC 13.5* 13.3* 13.5*   HGB 8.2* 9.3* 8.4*    233 225       LFTs -   Recent Labs   Lab Test 02/23/21  1201 02/23/21  0311 02/22/21  1001   ALKPHOS 188* 178* 206*   BILITOTAL 0.7 0.7 0.9   * 102* 181*   * 215* 348*   PROTTOTAL 6.9 6.4* 7.2   ALBUMIN 2.5* 2.2* 2.4*       Iron Panel -   Recent Labs   Lab Test 11/16/20  0616 02/08/20  0408   IRON 16* 25*   IRONSAT 6* 8*   HEAVENLY 75 189         Imaging:    Current Medications:    sodium chloride 0.9%  250 mL Intravenous Once in dialysis     sodium chloride 0.9%  300 mL Hemodialysis Machine Once     sodium chloride (PF) 0.9%  3 mL Intracatheter During Hemodialysis (from stock)     sodium chloride (PF) 0.9%  3 mL Intracatheter During Hemodialysis (from stock)     allopurinol  100 mg Oral or Feeding Tube Daily     aspirin  81 mg Oral Daily     azithromycin  500 mg Intravenous Q24H     B and C vitamin Complex with folic acid  5 mL Per Feeding Tube Daily     ceFAZolin  2 g Intravenous  Pre-Op/Pre-procedure x 1 dose     ceFAZolin  2 g Intravenous Daily     finasteride  5 mg Oral Daily     FLUoxetine  30 mg Oral Daily     gelatin absorbable  1 each Topical During Hemodialysis (from stock)     hydrALAZINE  25 mg Oral or Feeding Tube Q8H BRITTANI     insulin aspart  1-7 Units Subcutaneous TID AC     insulin aspart  1-5 Units Subcutaneous At Bedtime     omeprazole  20 mg Oral QAM AC     protein modular  1 packet Per Feeding Tube 4x Daily     senna-docusate  2 tablet Oral BID     sodium chloride (PF)  9 mL Intracatheter During Hemodialysis (from stock)     sodium chloride (PF)  9 mL Intracatheter During Hemodialysis (from stock)     tamsulosin  0.4 mg Oral Daily       [Held by provider] bivalirudin ANTICOAGULANT (ANGIOMAX) 250mg/250mL in 0.9% Sodium Chloride Stopped (02/20/21 1800)     dextrose       dextrose Stopped (02/17/21 1600)     DOBUTamine 3 mcg/kg/min (02/23/21 1200)     - MEDICATION INSTRUCTIONS -       - MEDICATION INSTRUCTIONS -       Warfarin Therapy Reminder       Jese Barry MD     I have seen and examined this patient with the resident.  This note reflects our joint assessment and plan.     Anila Tello MD

## 2021-02-23 NOTE — PROGRESS NOTES
Interventional Radiology Intra-procedural Nursing Note    Patient Name: Elsieo Tanner  Medical Record Number: [id number]  Today's Date: February 23, 2021    Start Time: 1315  End of procedure time: 1340  Procedure: Tunneled central venous catheter placement for HD  Fire Safety Score: 2    Consent Review/Timeout Performed by: Dr. Daquan Archibald   Procedure Performed By: Dr. Salbador Munoz    Fentanyl administered: 50 mcg  Versed administered: 1 mg    Start of Sedation Time: 1315  End of Sedation Time: 1340  Total Sedation Time: 25 minutes    Procedure start time: 1315  Puncture time: 1317  Procedure end time: 1340    Report given to: Shobha PETERSON  Time pt departs:  1345  : NA    Other Notes:  Alert male transported via cart from inpatient room to IR Procedure Room 4 for planned intervention.  ID band confirmed and patient acknowledges understanding of planned procedure. Patient repositioned to procedure table via hover-mat and positioned supine.  Patient prepped and draped per policy see VS flowsheet, MAR for further information.         Right chest wall site identified and a  14.5 F Palindrome catheter is inserted. Dual lumen anti-coagulated using citrate by Provider using citrate.  Catheter confirmed by IR Provider ready for use.  Site cleansed and dressed per protocol.    Patient returned to inpatient room for post-procedure monitoring.    Majran Yee RN

## 2021-02-23 NOTE — PLAN OF CARE
Major Shift Events: Patient Ox4, but forgetful and confused at times. Patient complained of increased SOB, MD aware. Patient also refused to wear home CPAP overnight. Patient remained SR/Paced overnight, MAP's >65, and dobumatine at 3mcg/kg/min. LVAD flow 4.0-4.3 5500 PI 3.7-3.9 Power 3.7-3.9. GI/ - patient urinated x2 over night for a total of 350. No new skin issues.     Plan:     For vital signs and complete assessments, please see documentation flowsheets.

## 2021-02-23 NOTE — PROGRESS NOTES
[facility]    Cardiology Progress Note- Cards 2        Date of Admission:  2/15/2021     Today's Plan  - Continue low dose dobutamine  - Continue broad spectrum antibiotics    -Cefazolin (2/18/21 - 3/1/21) followed by PO cefalexin   -Azithromycin (2/15/21 - 2/25/21)    Cefepime (2/16/21 - 2/18/21) (deescalated to cefazolin)    Vancomycin (2/15/21 - 2/18/21) (MRSA nares negative)    Piperacillin tazobactam (2/15/21-2/16/21) (switched for renal protection)  - maintain euvolemic  - TDC and IHD today per nephrology  - ongoing PT/OT  - transfer to floor  - continue warfarin    Assessment & Plan: S      Eliseo Tanner is a 65 year old male with PMHx relevant for NICM with LVEF 15-20%, NYHA III s/p HM III 2/18/2020 (post op complicated by retrosternal hematoma with bleeding in the lungs), s/p ICD placed on 3/16/2020, h/o MSSA driveline infection and bacteremia 11/5/2020 on prophylactic cephalexin, h/o VT on amiodarone, moderate nonobstructive CAD, severe MR, atrial fibrillation on warfarin, hypertension, ABHINAV requiring CPAP, CKD IV, DMII, HLP,  h/o HIT who presented to the Cardiovascular Unit (4E Cardiac ICU) with mixed cardiogenic and distributive shock withan intermittent wide complex tachycardia. Initially requiring vasopressors and inotropes, intubated for hypoxemia. Treated for legionella pneumonia. Stabilized on low dose dobutamine off of pressors. Extubated. With persistent anuric renal failure.     Cardiovascular:    #Mixed Cardiogenic and Septic Shock  #Multiorgan Failure   Presented from [facility] with subacute development of shortness of breath, dyspnea on exertion, dizziness/lightheadedness with near syncopal event found to be febrile with intermittent wide complex tachycardia. Recent COVID19 moderna vaccination. CT chest showing bilateral infiltrates. Community acquired pneumonia vs. Possible recurrent of MSSA bacteremia WBC 24.5,  Procal 7.95, , Lactacte 6.9.    Mildly elevated filling pressures on presentation CVP 18, PA 35/20, PCWP 11. Likely some component of cardiogenic shock. LVAD parameters relatively stable despite markedly elevated LDH 8,531. Euvolemic on exam. Worsening renal function, Cr 4.62, up-trending LFT's/ INR. Legionella antigen positive. Weaned off of vasopressor and maintained on dobutamine. Acute hepatic injury which is improving. Requiring dialysis. Shock resolved.     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 Bhatti (ID clinic)   # Troponin elevation (likely demand ischemia)  # Essential Hypertension  # Hyperlipidemia        Patient with long standing history of NICM previously implanted LVAD (HM III) on 02/18/20 due to worsening functional status and systolic ventricular dysfunction (further complicated by RV dysfunction by VAD hemodynamic compensatory mechanism, VT in ICU now on Amiodarone and Atrial Fibrillation with AVR requiring DCCV on 02/28/20).      Elevated cardiac biomarkers on presentation, SGC with only mildly elevated filling pressures. Euvolemic on exam. Troponin elevation likely related to demand ischemia with no concerns for ACS. Most recent VAD interrogation without any significant Flow-Alarms    LDH 8,531. Initial concern for LVAD thrombus. However given relatively stable LVAD parameters, degree of LDH elevation is out of proportion. Will continue to monitor for LVAD alarms.      - continue dobutamine  - Held ACE-I/ARB/ARNi: Lisinopril; due to worsening BERNARDA  - No BB; deferred  2/2 shock  - Aldosterone antagonist: deferred while other medical therapy is optimized  - SCD prophylaxis: ICD  - Fluid status : euvolemic, maintain euvolemic with CRRT  - MAP Goal: 65-85  - On Warfarin for HM-III INR Goal 2-3  - Continue Hydralazine 25mg   - Continue ASA 81 mg per oral daily       # Wide complex tachycardia. Atrial Flutter vs Afib vs Ventricular Tachycardia  # History of Atrial Fibrillation s/p DCCV/history of Atrial Flutter       Wide complex tachycardia HR 140s on presentation in the setting of febrile illness and likely pneumonia. Did not initially respond to adenosine. Concern for VT. Intermittently back into HR 60s with underlying rhythm of atrial flutter 3:1 conduction block. Per EP can not rule out VT, may be dual tachycardias.       - hold Amiodarone gtt  - Ensure K+ >4.0 mEq/L and Mg+2 >2.0  - continue warfarin  - On cardiac telemetry    Pulmonary  #Hypoxemic Respiratory Failure  #Legionella Pneumonia  Hypoxemic in the setting of mixed cardiogenic septic shock requiring emergent intubation. O2 requirements quickly improved. S/p extubation on room air    Infectious disease    #Sepsis  #Legionella Pneumonia  #Bilateral pulmonary infiltrates  CT showing bilateral diffuse patchy consolidations concerning for infectious process. Low suspicion for recurrent driveline or possible hardware infection (history of MSSA Chronic Driveline Infection). Urine without pyuria. Urine legionella ag positive.   - f/u blood cultures  - continue Abx as per ID   -Cefazolin (2/18/21 - 3/1/21) followed by PO cefalexin   -Azithromycin (2/15/21 - 2/25/21)    Renal:  # Acute on Chronic CKD stage IV likely 2/2 Septic Shock   Creatinine continues uptrending 3.23 > 4.62 > 4.9. s/p L TDC  - continue CRRT  - Daily Weight's  - Strict I/O's  - Daily BMP's  - Avoid any additional nephrotoxicity      GI  # Shock Liver   # Congestive Hepatopathy  Hepatoccelular pattern of liver injury  > 3,496, AST 1,441 > 8,490 likely  2/2 to septic shock. INR downtrended s/p 3mg IV vit K. RUQ US without portal vein thrombus. LFTs continue to downtrend.   - Trend LFT's   - continue Tube feeds  - continue bowel regimen     Hematological   # Chronic Microcytic/Hypochromic Anemia (likely related to iron deficiency)  # Coagulopathy related to sepsis   # IgG Kappa monoclonal Gammopathy of undetermined significance  - Monitor Hgb (baseline 8-9g/dL)  - transfuse for Hb < 7.0   - Patient follows with [facility] (Dr. Ibarra)     # Previous Concern For HIT  On previous hospitalization for LVAD placement (02/06/20-03/20/20), concern for HIT. 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    At that time, patient underwent two HIT panel tests (first one was negative and second antibody test was positive). No known thrombosis events. DAVINA antibody was negative raising question about validity of diagnosis . Per hematology at the time (Dr. James), no other cause for isolated thrombocytopenia. Immediate recovery of plts following d/c of heparin. 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     - avoiding heparin products  - continue warfarin     Endocrine  # Type II DM     > Last Hgb A1C: 6.8 (01/06/21)     - Continue PTA Insulin Basaglar 10 Units Aq at bedtime  - On Medium sliding scale insulin  - Oral Hypoglycemia Protocol      Neurologic:  # Disorientation/Toxic Metabolic Encephalopathy - Resolved  # Chronic Intracranial Small Vessel Disease        Patient reported some lightheadedness/dizziness and disorientation the days prior to hospitalization while at home. However, no reports of LOC, seizure activity, focal deficits, or findings for acute intracranial pathology on most recent OSH CT head w/o contrast (02/15/21). Mental status improved, s/p extubation      DVT Prophylaxis: therapeutic warfarin  Guevara Catheter: not present  Code  Status: Full Code  Fluids: no IVFs  Lines: RIJ TDC ()      Disposition Plan   Expected discharge: > 7 days, recommended to transitional care unit once fluid volume status optimized on oral medication, arrhythmia stabilized , therapeutic anticoagulation achieved and safe disposition plan/ TCU bed available.    Entered: Daniel Fleming MD 2021, 1:41 PM       The patient's care was discussed with the Attending Physician, Dr. Judd.    Daniel Fleming MD  [facility]  Please see sign in/sign out for up to date coverage information      I have reviewed today's vital signs, notes, medications, labs and imaging. I have also seen and examined the patient and agree with the findings and plan as outlined above. VAD interrogated. No power spikes, speed drops, signfiicant PI events or other findings suspicious of pump malfunction noted.  Speed 5500 rpm, Pulsatility index 3.6-3.9,  Power 4.2-4.4 Muhammad,  Flow 4.2-4.4 L/minute     Veronica Judd MD  Section Head - Advanced Heart Failure, Transplantation and Mechanical Circulatory Support  Director - Adult Congenital [facility] Cardiovascular [facility]  Associate Professor of Medicine, [facility]         Interval History   Overnight no acute events. Yesterday CRRT held. Overnight with some mild pleuritic chest pain. Improved this AM. Continued on low dose dobutamine. TDC placement this afternoon. Denies SOB, fevers, chills.     Date CVP PA PCWP CO CI SVR SvO2   21 19 40/24 14 5.2 2.4 910 53%   21 8 34/16 12 4.6 2.15 991 48%   21 8 26/18 18 4.8 2.2     21 10 32/16 16 4.1 1.9 1090 38%   21 12           Heartmate 3 (centrifugal flow) VS  Flow (Lpm): 4.2 Lpm  Pulse Index (PI): 3.7 PI  Speed (rpm): 5500 rpm  Power (muhammad): 4.2 muhammad  Current Hct settin    Data reviewed today: I reviewed all medications, new labs and imaging results over the last 24 hours.      Physical Exam   Vital  Signs: Temp: 97.9  F (36.6  C) Temp src: Oral BP: 111/64 Pulse: 101   Resp: 23 SpO2: 94 % O2 Device: None (Room air)    Weight: 210 lbs 15.68 oz    In general, the patient is awake alert and oriented, in no apparent distress.      Neck: RIJ TDC in place. No JVP  Heart: RRR. S1 and S2 no appreciated. Mechanical whir. No rub, click, or gallop.   Lungs: Bilateral rhonchi and rales   GI: Soft, nontender, nondistended.   Extremities: trace edema. The pulses are 2+at the radial and DP bilaterally.  Neuro: grossly non focal  Skin: no rashes.    Data   Recent Labs   Lab 02/23/21  1201 02/23/21  0311 02/22/21  1001 02/22/21  0353 02/21/21  0335 02/21/21  0335   WBC  --  13.5*  --  13.3*  --  13.5*   HGB  --  8.2*  --  9.3*  --  8.4*   MCV  --  73*  --  74*  --  73*   PLT  --  249  --  233  --  225   INR  --  2.60*  --  2.26*  --  2.21*    133 135 136   < > 135   POTASSIUM 5.0 4.7 4.4 4.7   < > 4.4   CHLORIDE 102 102 105 106   < > 105   CO2 22 23 21 24   < > 26   BUN 62* 49* 30 30   < > 30   CR 4.28* 3.67* 1.91* 1.94*   < > 1.85*   ANIONGAP 9 8 8 6   < > 4   CALOS 7.9* 8.4* 8.2* 8.1*   < > 8.4*   * 127* 146* 109*   < > 94   ALBUMIN 2.5* 2.2* 2.4* 2.3*   < > 2.3*   PROTTOTAL 6.9 6.4* 7.2 6.6*   < > 6.6*   BILITOTAL 0.7 0.7 0.9 0.9   < > 0.8   ALKPHOS 188* 178* 206* 190*   < > 196*   * 102* 181* 197*   < > 448*   * 215* 348* 368*   < > 709*    < > = values in this interval not displayed.     TTE 2/15/2021  Interpretation Summary  HM 3 at 5500 RPM  LV size is small, LVIDd = 3.0 cm. Severely reduced left ventricular function.  Doppler of the inflow cannula and outflow graft are normal.  RV is severely dilated. Severely reduced right ventricular function.  Mild TR is present.  The aortic valve is closed with mild regurgitation.  IVC is dilated and patient is on a ventilator.  No pericardial effusion present.

## 2021-02-24 ENCOUNTER — APPOINTMENT (OUTPATIENT)
Dept: OCCUPATIONAL THERAPY | Facility: CLINIC | Age: 66
DRG: 870 | End: 2021-02-24
Attending: INTERNAL MEDICINE
Payer: MEDICARE

## 2021-02-24 LAB
ALBUMIN SERPL-MCNC: 2.2 G/DL (ref 3.4–5)
ALBUMIN SERPL-MCNC: 2.5 G/DL (ref 3.4–5)
ALP SERPL-CCNC: 160 U/L (ref 40–150)
ALP SERPL-CCNC: 168 U/L (ref 40–150)
ALT SERPL W P-5'-P-CCNC: 66 U/L (ref 0–70)
ALT SERPL W P-5'-P-CCNC: 94 U/L (ref 0–70)
ANION GAP SERPL CALCULATED.3IONS-SCNC: 8 MMOL/L (ref 3–14)
ANION GAP SERPL CALCULATED.3IONS-SCNC: 9 MMOL/L (ref 3–14)
ANISOCYTOSIS BLD QL SMEAR: SLIGHT
APTT PPP: 46 SEC (ref 22–37)
AST SERPL W P-5'-P-CCNC: 132 U/L (ref 0–45)
AST SERPL W P-5'-P-CCNC: 146 U/L (ref 0–45)
BASE EXCESS BLDV CALC-SCNC: 1.4 MMOL/L
BASOPHILS # BLD AUTO: 0 10E9/L (ref 0–0.2)
BASOPHILS NFR BLD AUTO: 0 %
BILIRUB SERPL-MCNC: 0.6 MG/DL (ref 0.2–1.3)
BILIRUB SERPL-MCNC: 0.7 MG/DL (ref 0.2–1.3)
BUN SERPL-MCNC: 44 MG/DL (ref 7–30)
BUN SERPL-MCNC: 59 MG/DL (ref 7–30)
CALCIUM SERPL-MCNC: 7.9 MG/DL (ref 8.5–10.1)
CALCIUM SERPL-MCNC: 8 MG/DL (ref 8.5–10.1)
CHLORIDE SERPL-SCNC: 98 MMOL/L (ref 94–109)
CHLORIDE SERPL-SCNC: 99 MMOL/L (ref 94–109)
CO2 SERPL-SCNC: 23 MMOL/L (ref 20–32)
CO2 SERPL-SCNC: 25 MMOL/L (ref 20–32)
CREAT SERPL-MCNC: 3.84 MG/DL (ref 0.66–1.25)
CREAT SERPL-MCNC: 5.03 MG/DL (ref 0.66–1.25)
DIFFERENTIAL METHOD BLD: ABNORMAL
EOSINOPHIL # BLD AUTO: 0 10E9/L (ref 0–0.7)
EOSINOPHIL NFR BLD AUTO: 0 %
ERYTHROCYTE [DISTWIDTH] IN BLOOD BY AUTOMATED COUNT: 22.5 % (ref 10–15)
GFR SERPL CREATININE-BSD FRML MDRD: 11 ML/MIN/{1.73_M2}
GFR SERPL CREATININE-BSD FRML MDRD: 15 ML/MIN/{1.73_M2}
GLUCOSE BLDC GLUCOMTR-MCNC: 113 MG/DL (ref 70–99)
GLUCOSE BLDC GLUCOMTR-MCNC: 116 MG/DL (ref 70–99)
GLUCOSE BLDC GLUCOMTR-MCNC: 156 MG/DL (ref 70–99)
GLUCOSE BLDC GLUCOMTR-MCNC: 157 MG/DL (ref 70–99)
GLUCOSE SERPL-MCNC: 125 MG/DL (ref 70–99)
GLUCOSE SERPL-MCNC: 161 MG/DL (ref 70–99)
HCO3 BLDV-SCNC: 26 MMOL/L (ref 21–28)
HCT VFR BLD AUTO: 25 % (ref 40–53)
HGB BLD-MCNC: 7.8 G/DL (ref 13.3–17.7)
HYPOCHROMIA BLD QL: PRESENT
INR PPP: 2.56 (ref 0.86–1.14)
INTERPRETATION ECG - MUSE: NORMAL
LACTATE BLD-SCNC: 0.7 MMOL/L (ref 0.7–2)
LDH SERPL L TO P-CCNC: 307 U/L (ref 85–227)
LYMPHOCYTES # BLD AUTO: 0.7 10E9/L (ref 0.8–5.3)
LYMPHOCYTES NFR BLD AUTO: 6.3 %
MACROCYTES BLD QL SMEAR: PRESENT
MCH RBC QN AUTO: 23.4 PG (ref 26.5–33)
MCHC RBC AUTO-ENTMCNC: 31.2 G/DL (ref 31.5–36.5)
MCV RBC AUTO: 75 FL (ref 78–100)
MONOCYTES # BLD AUTO: 1 10E9/L (ref 0–1.3)
MONOCYTES NFR BLD AUTO: 8.9 %
MYELOCYTES # BLD: 0.3 10E9/L
MYELOCYTES NFR BLD MANUAL: 2.7 %
NEUTROPHILS # BLD AUTO: 8.9 10E9/L (ref 1.6–8.3)
NEUTROPHILS NFR BLD AUTO: 82.1 %
O2/TOTAL GAS SETTING VFR VENT: 21 %
OXYHGB MFR BLDV: 51 %
PCO2 BLDV: 41 MM HG (ref 40–50)
PH BLDV: 7.42 PH (ref 7.32–7.43)
PLATELET # BLD AUTO: 244 10E9/L (ref 150–450)
PLATELET # BLD EST: ABNORMAL 10*3/UL
PO2 BLDV: 31 MM HG (ref 25–47)
POIKILOCYTOSIS BLD QL SMEAR: SLIGHT
POLYCHROMASIA BLD QL SMEAR: SLIGHT
POTASSIUM SERPL-SCNC: 4.1 MMOL/L (ref 3.4–5.3)
POTASSIUM SERPL-SCNC: 4.7 MMOL/L (ref 3.4–5.3)
PROT SERPL-MCNC: 6.4 G/DL (ref 6.8–8.8)
PROT SERPL-MCNC: 6.7 G/DL (ref 6.8–8.8)
RBC # BLD AUTO: 3.34 10E12/L (ref 4.4–5.9)
SODIUM SERPL-SCNC: 131 MMOL/L (ref 133–144)
SODIUM SERPL-SCNC: 132 MMOL/L (ref 133–144)
TARGETS BLD QL SMEAR: SLIGHT
WBC # BLD AUTO: 10.8 10E9/L (ref 4–11)

## 2021-02-24 PROCEDURE — 97110 THERAPEUTIC EXERCISES: CPT | Mod: GO

## 2021-02-24 PROCEDURE — 250N000013 HC RX MED GY IP 250 OP 250 PS 637: Performed by: INTERNAL MEDICINE

## 2021-02-24 PROCEDURE — 250N000013 HC RX MED GY IP 250 OP 250 PS 637

## 2021-02-24 PROCEDURE — 999N000157 HC STATISTIC RCP TIME EA 10 MIN

## 2021-02-24 PROCEDURE — 250N000011 HC RX IP 250 OP 636: Performed by: STUDENT IN AN ORGANIZED HEALTH CARE EDUCATION/TRAINING PROGRAM

## 2021-02-24 PROCEDURE — 250N000013 HC RX MED GY IP 250 OP 250 PS 637: Performed by: STUDENT IN AN ORGANIZED HEALTH CARE EDUCATION/TRAINING PROGRAM

## 2021-02-24 PROCEDURE — 214N000001 HC R&B CCU UMMC

## 2021-02-24 PROCEDURE — 93005 ELECTROCARDIOGRAM TRACING: CPT

## 2021-02-24 PROCEDURE — 99233 SBSQ HOSP IP/OBS HIGH 50: CPT | Mod: GC | Performed by: INTERNAL MEDICINE

## 2021-02-24 PROCEDURE — 93010 ELECTROCARDIOGRAM REPORT: CPT | Performed by: INTERNAL MEDICINE

## 2021-02-24 PROCEDURE — 85610 PROTHROMBIN TIME: CPT | Performed by: STUDENT IN AN ORGANIZED HEALTH CARE EDUCATION/TRAINING PROGRAM

## 2021-02-24 PROCEDURE — 85730 THROMBOPLASTIN TIME PARTIAL: CPT | Performed by: STUDENT IN AN ORGANIZED HEALTH CARE EDUCATION/TRAINING PROGRAM

## 2021-02-24 PROCEDURE — 97530 THERAPEUTIC ACTIVITIES: CPT | Mod: GO

## 2021-02-24 PROCEDURE — 82805 BLOOD GASES W/O2 SATURATION: CPT | Performed by: STUDENT IN AN ORGANIZED HEALTH CARE EDUCATION/TRAINING PROGRAM

## 2021-02-24 PROCEDURE — 99232 SBSQ HOSP IP/OBS MODERATE 35: CPT | Mod: GC | Performed by: INTERNAL MEDICINE

## 2021-02-24 PROCEDURE — 85025 COMPLETE CBC W/AUTO DIFF WBC: CPT | Performed by: STUDENT IN AN ORGANIZED HEALTH CARE EDUCATION/TRAINING PROGRAM

## 2021-02-24 PROCEDURE — 83605 ASSAY OF LACTIC ACID: CPT | Performed by: STUDENT IN AN ORGANIZED HEALTH CARE EDUCATION/TRAINING PROGRAM

## 2021-02-24 PROCEDURE — 80053 COMPREHEN METABOLIC PANEL: CPT | Performed by: INTERNAL MEDICINE

## 2021-02-24 PROCEDURE — 999N001017 HC STATISTIC GLUCOSE BY METER IP

## 2021-02-24 PROCEDURE — 250N000009 HC RX 250: Performed by: STUDENT IN AN ORGANIZED HEALTH CARE EDUCATION/TRAINING PROGRAM

## 2021-02-24 PROCEDURE — 83615 LACTATE (LD) (LDH) ENZYME: CPT | Performed by: STUDENT IN AN ORGANIZED HEALTH CARE EDUCATION/TRAINING PROGRAM

## 2021-02-24 PROCEDURE — 258N000003 HC RX IP 258 OP 636: Performed by: STUDENT IN AN ORGANIZED HEALTH CARE EDUCATION/TRAINING PROGRAM

## 2021-02-24 PROCEDURE — 80053 COMPREHEN METABOLIC PANEL: CPT | Performed by: STUDENT IN AN ORGANIZED HEALTH CARE EDUCATION/TRAINING PROGRAM

## 2021-02-24 RX ORDER — WARFARIN SODIUM 1 MG/1
2 TABLET ORAL
Status: COMPLETED | OUTPATIENT
Start: 2021-02-24 | End: 2021-02-24

## 2021-02-24 RX ORDER — AZITHROMYCIN 250 MG/1
500 TABLET, FILM COATED ORAL ONCE
Status: COMPLETED | OUTPATIENT
Start: 2021-02-25 | End: 2021-02-25

## 2021-02-24 RX ORDER — OXYMETAZOLINE HYDROCHLORIDE 0.05 G/100ML
2 SPRAY NASAL 2 TIMES DAILY
Status: DISCONTINUED | OUTPATIENT
Start: 2021-02-24 | End: 2021-02-28

## 2021-02-24 RX ORDER — HYDRALAZINE HYDROCHLORIDE 50 MG/1
50 TABLET, FILM COATED ORAL EVERY 8 HOURS SCHEDULED
Status: DISCONTINUED | OUTPATIENT
Start: 2021-02-24 | End: 2021-02-27

## 2021-02-24 RX ADMIN — WARFARIN SODIUM 2 MG: 1 TABLET ORAL at 18:39

## 2021-02-24 RX ADMIN — FINASTERIDE 5 MG: 5 TABLET, FILM COATED ORAL at 08:47

## 2021-02-24 RX ADMIN — ZOLPIDEM TARTRATE 5 MG: 5 TABLET, FILM COATED ORAL at 21:38

## 2021-02-24 RX ADMIN — ASPIRIN 81 MG CHEWABLE TABLET 81 MG: 81 TABLET CHEWABLE at 08:48

## 2021-02-24 RX ADMIN — FLUOXETINE 30 MG: 20 CAPSULE ORAL at 11:38

## 2021-02-24 RX ADMIN — CEFAZOLIN SODIUM 2 G: 2 INJECTION, SOLUTION INTRAVENOUS at 20:14

## 2021-02-24 RX ADMIN — ALLOPURINOL 100 MG: 100 TABLET ORAL at 08:47

## 2021-02-24 RX ADMIN — Medication 1 CAPSULE: at 11:37

## 2021-02-24 RX ADMIN — INSULIN ASPART 1 UNITS: 100 INJECTION, SOLUTION INTRAVENOUS; SUBCUTANEOUS at 16:41

## 2021-02-24 RX ADMIN — HYDRALAZINE HYDROCHLORIDE 50 MG: 50 TABLET ORAL at 21:38

## 2021-02-24 RX ADMIN — HYDRALAZINE HYDROCHLORIDE 25 MG: 25 TABLET, FILM COATED ORAL at 14:32

## 2021-02-24 RX ADMIN — AZITHROMYCIN MONOHYDRATE 500 MG: 500 INJECTION, POWDER, LYOPHILIZED, FOR SOLUTION INTRAVENOUS at 08:48

## 2021-02-24 RX ADMIN — DOBUTAMINE HYDROCHLORIDE 3 MCG/KG/MIN: 200 INJECTION INTRAVENOUS at 18:01

## 2021-02-24 RX ADMIN — HYDRALAZINE HYDROCHLORIDE 25 MG: 25 TABLET, FILM COATED ORAL at 05:57

## 2021-02-24 RX ADMIN — OMEPRAZOLE 20 MG: 20 CAPSULE, DELAYED RELEASE ORAL at 08:47

## 2021-02-24 RX ADMIN — TAMSULOSIN HYDROCHLORIDE 0.4 MG: 0.4 CAPSULE ORAL at 08:48

## 2021-02-24 RX ADMIN — OXYMETAZOLINE HYDROCHLORIDE 2 SPRAY: 0.05 SPRAY NASAL at 18:20

## 2021-02-24 RX ADMIN — SALINE NASAL SPRAY 1 SPRAY: 1.5 SOLUTION NASAL at 14:34

## 2021-02-24 ASSESSMENT — ACTIVITIES OF DAILY LIVING (ADL)
ADLS_ACUITY_SCORE: 14

## 2021-02-24 NOTE — PROGRESS NOTES
Calorie Count  Intake recorded for: 2/23  Total Kcals: 0 Total Protein: 0g  Kcals from Hospital Food: 0   Protein: 0g  Kcals from Outside Food (average):0 Protein: 0g  # Meals Ordered from Kitchen: 2 meals ordered  # Meals Recorded: 0  # Supplements Recorded: 1 ordered, 0 recorded

## 2021-02-24 NOTE — PLAN OF CARE
PT Cancel/6C: Unable to see pt in AM 2/2 scheduling conflicts. Pt working with OT in PM, unable to tolerate 2 disciplines back to back. PT will initiate 2/25

## 2021-02-24 NOTE — PROGRESS NOTES
HEMODIALYSIS TREATMENT NOTE    Date: 2/23/2021  Time: 1750    Data:  Pre Wt: 95.7 kg (210 lb 15.7 oz)   Desired Wt: 95.7 kg   Post Wt: 95.7 kg (210 lb 15.7 oz) estimated  Weight change: 0 kg  Ultrafiltration - Post Run Net Total Removed (mL): 0 mL  Vascular Access Status: patent  Dialyzer Rinse: Light  Total Blood Volume Processed: 55.8 L   Total Dialysis (Treatment) Time: 2 hours     Lab: No    Assessment/Interventions:  2 hour HD run via right internal jugular dialysis catheter.  Blood flow 400 mL/min.  K2/Ca3 per protocol.  No fluid removed.  Tolerated treatment well.  Report given.     Plan: Continue with plan of care.  Next run per Renal Team.

## 2021-02-24 NOTE — PROGRESS NOTES
.    Nephrology Progress Note  02/24/2021         Assessment & Recommendations:   64 yo M with PMHx  NICM  s/p HM III , VT, severe MR, atrial fibrillation on warfarin, hypertension, CKD IV, DMII, HIT presented to OSH with fevers and cough in the setting of syncopal episode transferred to [facility] for further cares. Hospitalization complicated by Afib with RVR with hypotension and intubation with upgrade of cares to the ICU. Found to be in septic shock 2/2 legionella pneumonia with possible cardiogenic shock. Nephrology was consulted for evaluation and management of anuric BERNARDA     Oligoanuric BERNARDA 2/2 ischemic ATN  Baseline Cr last yr s/p LVED placement was around 1. On admission ~2.3 and doubled within 24 hours with rapid decrease in UOP. UA unimpressive with baseline proteinuria and new granular casts. Initiated on RRT 2/16.  Increased UOP ON.  - No HD today. Reassess iHD needs tomorrow  - Monitor for recovery of renal function  - Avoid nephrotoxins as able  - Renally dose meds  - Renal will continue to follow    Volume status:   Euvolemic.       Electrolytes - stable   Acid/base - stable   Anemia - Hb stable 8.2 Iron def in 11/2020.       Recommendations were communicated to primary team via note     Seen and discussed with Dr. Elisha Barry MD   [phone]    Interval History :   Nursing and provider notes from last 24 hours reviewed.    No major acute overnight events. Has made UOP ~ 500 ml overnight. Transferred out of the Unit and continues to be on dobutamine drips.      Review of Systems:   I reviewed the following systems:  GI: No nausea or vomiting or diarrhea.   Neuro:  No confusion  Constitutional:  No fever or chills  CV: No dyspnea or edema.    Physical Exam:   I/O last 3 completed shifts:  In: 466 [P.O.:240; I.V.:226]  Out: 475 [Urine:475]   BP 97/72 (BP Location: Left arm)   Pulse 110   Temp 97.8  F (36.6  C) (Oral)   Resp 16   Wt 95.3 kg (210 lb)   SpO2 97%   BMI 32.89 kg/m       General  : Pt awake, not in acute distress   Lungs : anterior lung fields are clear  Cardiac : LVAD in place  Abdomen : Soft/ND/NT  LE : no Edema noted  Dialysis Access : R internal jugular catheter    Labs:   All labs reviewed by me  Electrolytes/Renal -   Recent Labs   Lab Test 02/24/21 0442 02/23/21 2000 02/23/21  1201 02/22/21  1539 02/22/21  1539 02/22/21  0353 02/22/21  0353 02/21/21  1617 02/21/21  1617   * 134 133   < >  --    < > 136   < >  --    POTASSIUM 4.1 4.1 5.0   < >  --    < > 4.7   < >  --    CHLORIDE 99 101 102   < >  --    < > 106   < >  --    CO2 25 26 22   < >  --    < > 24   < >  --    BUN 44* 36* 62*   < >  --    < > 30   < >  --    CR 3.84* 2.95* 4.28*   < >  --    < > 1.94*   < >  --    * 147* 147*   < >  --    < > 109*   < >  --    CALOS 8.0* 8.3* 7.9*   < >  --    < > 8.1*   < >  --    MAG  --   --   --   --  2.6*  --  2.5*  --  2.6*   PHOS  --   --   --   --  5.2*  --  4.4  --  4.5    < > = values in this interval not displayed.       CBC -   Recent Labs   Lab Test 02/24/21 0442 02/23/21 0311 02/22/21 0353   WBC 10.8 13.5* 13.3*   HGB 7.8* 8.2* 9.3*    249 233       LFTs -   Recent Labs   Lab Test 02/24/21 0442 02/23/21 2000 02/23/21  1201   ALKPHOS 160* 175* 188*   BILITOTAL 0.7 0.6 0.7   ALT 66 80* 104*   * 164* 197*   PROTTOTAL 6.4* 6.5* 6.9   ALBUMIN 2.2* 2.4* 2.5*       Iron Panel -   Recent Labs   Lab Test 11/16/20  0616 02/08/20  0408   IRON 16* 25*   IRONSAT 6* 8*   HEAVENLY 75 189         Imaging:    Current Medications:    allopurinol  100 mg Oral or Feeding Tube Daily     aspirin  81 mg Oral Daily     [START ON 2/25/2021] azithromycin  500 mg Oral Once     ceFAZolin  2 g Intravenous Daily     finasteride  5 mg Oral Daily     FLUoxetine  30 mg Oral Daily     hydrALAZINE  25 mg Oral or Feeding Tube Q8H BRITTANI     insulin aspart  1-7 Units Subcutaneous TID AC     insulin aspart  1-5 Units Subcutaneous At Bedtime     melatonin  3 mg Oral At Bedtime     multivitamin  RENAL  1 capsule Oral Daily     omeprazole  20 mg Oral QAM AC     senna-docusate  2 tablet Oral BID     tamsulosin  0.4 mg Oral Daily     warfarin ANTICOAGULANT  2 mg Oral ONCE at 18:00       dextrose       dextrose Stopped (02/17/21 1600)     DOBUTamine 3 mcg/kg/min (02/24/21 0854)     Warfarin Therapy Reminder       Jese Barry MD     I have seen and examined this patient with the resident.  This note reflects our joint assessment and plan.     Anila Tello MD

## 2021-02-24 NOTE — PROGRESS NOTES
"CLINICAL NUTRITION SERVICES - REASSESSMENT NOTE     Nutrition Prescription    RECOMMENDATIONS FOR MDs/PROVIDERS TO ORDER:  None at this time.     Malnutrition Status:    Patient does not meet two of the established criteria necessary for diagnosing malnutrition but is at risk for malnutrition    Recommendations already ordered by Registered Dietitian (RD):  Modified oral supplements  Discontinued free water flushes    Future/Additional Recommendations:  1. Continue current diet, as ordered. Monitor potential need for a K+ or phos restrictions. Diet restrictions may be added as a \"Combination Diet.\"   2. Order a phos binder if needed. Phos of 5.2 on 2/22/21. May need to modify Boost Plus to Boost Glucose Control if phos remains elevated.   3. Continue nephrocaps, as ordered, since pt is on dialysis.   4. Monitor BG control. DM2. Hgb A1c of 6.8 on 1/6/21 and BG was 113 on 2/24/21.   5. Monitor iron status.     EVALUATION OF THE PROGRESS TOWARD GOALS   Diet: 2 g Sodium since 2/21. Previously NPO 2/16-2/21. NPO at times for tests/procedures. Ordered to receive Boost Plus at 14:00 and HS.   Intake: Tolerating diet. Per nursing flowsheets, pt consumed 100% with a good appetite 2/24. My[employer]App (meal ordering system) indicates food/beverages sent 2/21-2/23 totaled 1162 kcals and 59 g protein daily on average. Pt ordering two meals daily. Per discussion with pt and wife (Veronique), pt consuming 75% of his usual oral intake currently. Appetite has been improving. He ate well for breakfast. He likes strawberry or vanilla Boost Plus oral supplements and consumed one yesterday. Noticed a cookie from the cafe on his bedside table. Decreased appetite, now improving, is the main factor affecting oral intake.    Kcal counts:   2/23            # Meals Ordered from Kitchen: 2 meals ordered. # Meals Recorded: 0. # Supplements Recorded: 1 ordered, 0 recorded   2/24-2/25    Pending   *Lack of data so far.    Previous Nutrition Support " orders (2/17-2/20): Nutren 1.5 with goal rate of 60 mL/hr + Prosource (4 packets) = 2320 kcals, 142 g PRO, 1094 mL H2O, 253 g CHO 0 g fiber. Extubated 2/20, loss of feeding tube.     NEW FINDINGS   GI: Pt having zero to one stool per day. Last stool on 2/22. Stools have been soft and brown.   Nutrition hx: Per discussion with pt and wife, pt eating about 50% of his usual oral intake PTA. Pt states he was not hungry and did not feel like eating. Wife's occupation is a cook.   Wt Hx: 88.7 kg (4/2/20), 93 kg (5/19/20), 95.3 kg (11/18/20), 96.2 kg (12/17/20), 94.6 kg (1/8/21), 100.3 kg (2/16/21, admit), 95.3 kg (2/24) - No significant/severe wt loss. Fluid status changes, diuresing, and now on dialysis.      ASSESSED NUTRITION NEEDS -updated  Dosing Weight: 74 kg (adjusted, based on lowest wt this admission so far of 95.3 kg on 2/24/21)   Estimated Energy Needs: 5975-1174 kcals/day (30-35 kcals/kg)  Justification Estimated needs on dialysis, but rec the low end of this range due to wt status  Estimated Protein Needs:  grams protein/day (1.2-1.8 grams of pro/kg)  Justification: Increased needs on dialysis  Estimated Protein Needs: ~3490-2637 mL/day  Justification: Estimated needs on dialysis, pending fluid status and UOP    MALNUTRITION  % Intake: < 75% for >/= 1 month (non-severe)  % Weight Loss: Difficult to assess wt changes with changes in fluid status, diuresis, and dialysis  Subcutaneous Fat Loss: None observed  Muscle Loss: None observed  Fluid Accumulation/Edema: Trace  Malnutrition Diagnosis: Patient does not meet two of the established criteria necessary for diagnosing malnutrition but is at risk for malnutrition    Previous Goals   Total avg nutritional intake to meet a minimum of 25 kcal/kg and 1.5 g PRO/kg daily (per dosing wt 74 kg).  Evaluation: Not meeting.     Previous Nutrition Diagnosis  Inadequate protein-energy intake related to NPO without nutrition support as evidenced by currently meeting 0%  nutrition needs     Evaluation: Unresolved, but improving. Changed to new nutrition dx below.     CURRENT NUTRITION DIAGNOSIS  Inadequate oral intake related to decreased appetite as evidenced by report of pt consuming ~75% of his usual oral intake currently.       INTERVENTIONS  Implementation  Medical food supplement therapy: Modified oral supplements to send strawberry and vanilla, per pt preference. Encouraged pt to consume two oral supplements daily.   Nutrition education for nutrition relationship to health/disease: Encouraged oral intake adequacy. Explained diet order. Pt and wife have no questions about low-sodium diet. Offered sodium room service menu but pt/wife politely declined need for the menu.   Feeding tube flush: Discontinued free water flush due to no feeding tube access.     Goals  Patient to consume % of nutritionally adequate meal trays TID, or the equivalent with supplements/snacks.    Monitoring/Evaluation  Progress toward goals will be monitored and evaluated per protocol.     Nutrition will continue to follow.      Abby Woods, MS, RD, LD, [facility]   6C Pgr: [phone]

## 2021-02-24 NOTE — PLAN OF CARE
Major Shift Events: Pt A&Ox4 today, forgetful at times. Dobutamine @ 3mcg/kg/min. LVAD flow: 4.2-4.3, PI: 3.6-3.9, speed: 5500, power: 4.2-4.3. MAP's >65. Tunneled HD line placed in IR, HD completed bedside.  Plan: Transfer to . Encourage CPAP @ noc, give PRN Ambien/melatonin  For vital signs and complete assessments, please see documentation flowsheets.

## 2021-02-24 NOTE — PLAN OF CARE
Receiving Note  D-Received as transfer from  via bed around 1900. LVAD HM 3 pt admitted on 02/15/21 with mixed cardiogenic and distributive shock. PMH includes NICM, MSSA driveline infection, VT, ICD, CAD, severe MR, afib, HTN, ABHINAV (home cpap), CKD, DM 2, legionella pneumonia.  See flow sheets for vs and assessments. INR 2.60.  I-Oriented to bedside controls and unit routines. Bed alarm activated per unit policy, pt informed. Dobutamine infusing at 3mcg/kg/min. Drive line dressing changed. Home cpap set up. BMP drawn. (see lab flow sheets for results) Pt instructed to call for assistance to get OOB. Tunneled cath placed in IR today for dialysis.  A-Telemetry shows ST. Scant amount of brown drainage on old LVAD dressing.  P-Continue with current poc.

## 2021-02-24 NOTE — PLAN OF CARE
D/I/A: Pt here w/ cardiogenic and distributive shock with an intermittent wide complex tachycardia, BERNARDA, on new dialysis. Up w/ ax1. Sat up and stood up and walked to BR without issue. VAD #s remained WNL. Dressing changed, minimal drainage noted. Good appetite. PICC line intact.  P: Continue to monitor.

## 2021-02-24 NOTE — PROGRESS NOTES
[facility]    Cardiology Progress Note- Cards 2        Date of Admission:  2/15/2021     Today's Plan  - Continue low dose dobutamine  - increase hydralazine 50mg tid  - Continue broad spectrum antibiotics    -Cefazolin (2/18/21 - 3/1/21) followed by PO cefalexin   -Azithromycin (2/15/21 - 2/25/21)    Cefepime (2/16/21 - 2/18/21) (deescalated to cefazolin)    Vancomycin (2/15/21 - 2/18/21) (MRSA nares negative)    Piperacillin tazobactam (2/15/21-2/16/21) (switched for renal protection)  - maintain euvolemic  - IHD likely tomorrow as per nephrology  - ongoing PT/OT  - continue warfarin  - Social work with plans for dialysis and placement    Assessment & Plan: [facility]      Eliseo Tanner is a 65 year old male with PMHx relevant for NICM with LVEF 15-20%, NYHA III s/p HM III 2/18/2020 (post op complicated by retrosternal hematoma with bleeding in the lungs), s/p ICD placed on 3/16/2020, h/o MSSA driveline infection and bacteremia 11/5/2020 on prophylactic cephalexin, h/o VT on amiodarone, moderate nonobstructive CAD, severe MR, atrial fibrillation on warfarin, hypertension, ABHINAV requiring CPAP, CKD IV, DMII, HLP,  h/o HIT who presented to the Cardiovascular Unit (4E Cardiac ICU) with mixed cardiogenic and distributive shock withan intermittent wide complex tachycardia. Initially requiring vasopressors and inotropes, intubated for hypoxemia. Treated for legionella pneumonia. Stabilized on low dose dobutamine off of pressors. Extubated. With persistent oliguric renal failure.     Cardiovascular:    #Mixed Cardiogenic and Septic Shock  #Multiorgan Failure   Presented from [facility] with subacute development of shortness of breath, dyspnea on exertion, dizziness/lightheadedness with near syncopal event found to be febrile with intermittent wide complex tachycardia. Recent COVID19 moderna vaccination. CT chest showing bilateral infiltrates. Community  acquired pneumonia vs. Possible recurrent of MSSA bacteremia WBC 24.5, Procal 7.95, , Lactacte 6.9.    Mildly elevated filling pressures on presentation CVP 18, PA 35/20, PCWP 11. Likely some component of cardiogenic shock. LVAD parameters relatively stable despite markedly elevated LDH 8,531. Euvolemic on exam. Worsening renal function, Cr 4.62, up-trending LFT's/ INR. Legionella antigen positive. Weaned off of vasopressor and maintained on dobutamine. Acute hepatic injury which is improving. Requiring dialysis. Shock resolved.     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 Bhatti (ID clinic)   # Troponin elevation (likely demand ischemia)  # Essential Hypertension  # Hyperlipidemia        Patient with long standing history of NICM previously implanted LVAD (HM III) on 02/18/20 due to worsening functional status and systolic ventricular dysfunction (further complicated by RV dysfunction by VAD hemodynamic compensatory mechanism, VT in ICU now on Amiodarone and Atrial Fibrillation with AVR requiring DCCV on 02/28/20).      Elevated cardiac biomarkers on presentation, SGC with only mildly elevated filling pressures. Euvolemic on exam. Troponin elevation likely related to demand ischemia with no concerns for ACS. Most recent VAD interrogation without any significant Flow-Alarms    LDH 8,531. Initial concern for LVAD thrombus. However given relatively stable LVAD parameters, degree of LDH elevation is out of proportion. Will continue to monitor for LVAD alarms.      - continue  dobutamine  - Held ACE-I/ARB/ARNi: Lisinopril; due to worsening BERNARDA  - No BB; deferred 2/2 shock  - Aldosterone antagonist: deferred while other medical therapy is optimized  - SCD prophylaxis: ICD  - Fluid status : euvolemic, maintain euvolemic with CRRT  - MAP Goal: 65-85  - On Warfarin for HM-III INR Goal 2-3  - Continue Hydralazine 25mg   - Continue ASA 81 mg per oral daily       # Wide complex tachycardia. Atrial Flutter vs Afib vs Ventricular Tachycardia  # History of Atrial Fibrillation s/p DCCV/history of Atrial Flutter       Wide complex tachycardia HR 140s on presentation in the setting of febrile illness and likely pneumonia. Did not initially respond to adenosine. Concern for VT. Intermittently back into HR 60s with underlying rhythm of atrial flutter 3:1 conduction block. Per EP can not rule out VT, may be dual tachycardias.       - hold Amiodarone gtt  - Ensure K+ >4.0 mEq/L and Mg+2 >2.0  - continue warfarin  - On cardiac telemetry    Pulmonary  #Hypoxemic Respiratory Failure  #Legionella Pneumonia  Hypoxemic in the setting of mixed cardiogenic septic shock requiring emergent intubation. O2 requirements quickly improved. S/p extubation on room air    Infectious disease    #Sepsis  #Legionella Pneumonia  #Bilateral pulmonary infiltrates  CT showing bilateral diffuse patchy consolidations concerning for infectious process. Low suspicion for recurrent driveline or possible hardware infection (history of MSSA Chronic Driveline Infection). Urine without pyuria. Urine legionella ag positive.   - f/u blood cultures  - continue Abx as per ID   -Cefazolin (2/18/21 - 3/1/21) followed by PO cefalexin   -Azithromycin (2/15/21 - 2/25/21)    Renal:  # Acute on Chronic CKD stage IV likely 2/2 Septic Shock   Creatinine continues uptrending 3.23 > 4.62 > 4.9. s/p L TDC  - continue CRRT  - Daily Weight's  - Strict I/O's  - Daily BMP's  - Avoid any additional nephrotoxicity      GI  # Shock Liver   # Congestive  Hepatopathy  Hepatoccelular pattern of liver injury  > 3,496, AST 1,441 > 8,490 likely 2/2 to septic shock. INR downtrended s/p 3mg IV vit K. RUQ US without portal vein thrombus. LFTs continue to downtrend.   - Trend LFT's   - continue Tube feeds  - continue bowel regimen     Hematological   # Chronic Microcytic/Hypochromic Anemia (likely related to iron deficiency)  # Coagulopathy related to sepsis   # IgG Kappa monoclonal Gammopathy of undetermined significance  - Monitor Hgb (baseline 8-9g/dL)  - transfuse for Hb < 7.0   - Patient follows with [facility] (Dr. Ibarra)     # Previous Concern For HIT  On previous hospitalization for LVAD placement (02/06/20-03/20/20), concern for HIT. 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    At that time, patient underwent two HIT panel tests (first one was negative and second antibody test was positive). No known thrombosis events. DAVINA antibody was negative raising question about validity of diagnosis . Per hematology at the time (Dr. James), no other cause for isolated thrombocytopenia. Immediate recovery of plts following d/c of heparin. 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     - avoiding heparin products  - continue warfarin     Endocrine  # Type II DM     > Last Hgb A1C: 6.8 (01/06/21)     - Continue PTA Insulin Basaglar 10 Units Aq at bedtime  - On Medium sliding scale insulin  - Oral Hypoglycemia Protocol      Neurologic:  # Disorientation/Toxic Metabolic Encephalopathy - Resolved  # Chronic Intracranial Small Vessel Disease        Patient reported some lightheadedness/dizziness and disorientation the days prior to hospitalization while at home. However, no reports of LOC, seizure activity, focal deficits, or findings for acute intracranial pathology on most recent OSH CT head w/o contrast (02/15/21). Mental status improved,  s/p extubation      DVT Prophylaxis: therapeutic warfarin  Guevara Catheter: not present  Code Status: Full Code  Fluids: no IVFs  Lines: RIJ TDC (2/23)      Disposition Plan   Expected discharge: 2 - 3 days, recommended to transitional care unit once fluid volume status optimized on oral medication and safe disposition plan/ TCU bed available.    Entered: Daniel Fleming MD 02/24/2021, 3:26 PM       The patient's care was discussed with the Attending Physician, Dr. Judd.    Daniel Fleming MD  [facility]  Please see sign in/sign out for up to date coverage information      Late entry - pt see and examined on 2/24/21  I have reviewed today's vital signs, notes, medications, labs and imaging. I have also seen and examined the patient and agree with the findings and plan as outlined above.    Veronica Judd MD  Section Head - Advanced Heart Failure, Transplantation and Mechanical Circulatory Support  Director - Adult Congenital [facility] Cardiovascular [facility]etics [facility]  Associate Professor of Medicine, [facility]      Interval History   Overnight no acute events. Feels significantly better today. Yesterday tolerated IHD with 0ml ultrafiltration. Continued on low dose dobutamine. Denies SOB, fevers, chills.     Date CVP PA PCWP CO CI SVR SvO2   2/16/21 19 40/24 14 5.2 2.4 910 53%   2/19/21 8 34/16 12 4.6 2.15 991 48%   2/20/21 8 26/18 18 4.8 2.2     2/20/21 10 32/16 16 4.1 1.9 1090 38%   2/21/21 12           Heartmate 3 (centrifugal flow) VS  Flow (Lpm): 4.5 Lpm  Pulse Index (PI): 3.7 PI  Speed (rpm): 5500 rpm  Power (muhammad): 4.2 muhammad  Current Hct setting: (P) 25    Data reviewed today: I reviewed all medications, new labs and imaging results over the last 24 hours.      Physical Exam   Vital Signs: Temp: 97.7  F (36.5  C) Temp src: Oral BP: 95/72 Pulse: 106   Resp: 16 SpO2: 96 % O2 Device: None (Room air)    Weight: 210 lbs 0 oz    In general, the patient is  awake alert and oriented, sitting at side of bed in no apparent distress.      Neck: RIJ TDC in place. No JVP  Heart: RRR. S1 and S2 no appreciated. Mechanical whir. No rub, click, or gallop.   Lungs: Mild bilateral crackles. No wheezes  GI: Soft, nontender, nondistended.   Extremities: trace edema. The pulses are 2+at the radial and DP bilaterally.  Neuro: grossly non focal  Skin: no rashes.    Data   Recent Labs   Lab 02/24/21  0442 02/23/21 2000 02/23/21  1201 02/23/21  0311 02/22/21  0353 02/22/21  0353   WBC 10.8  --   --  13.5*  --  13.3*   HGB 7.8*  --   --  8.2*  --  9.3*   MCV 75*  --   --  73*  --  74*     --   --  249  --  233   INR 2.56*  --   --  2.60*  --  2.26*   * 134 133 133   < > 136   POTASSIUM 4.1 4.1 5.0 4.7   < > 4.7   CHLORIDE 99 101 102 102   < > 106   CO2 25 26 22 23   < > 24   BUN 44* 36* 62* 49*   < > 30   CR 3.84* 2.95* 4.28* 3.67*   < > 1.94*   ANIONGAP 8 8 9 8   < > 6   CALOS 8.0* 8.3* 7.9* 8.4*   < > 8.1*   * 147* 147* 127*   < > 109*   ALBUMIN 2.2* 2.4* 2.5* 2.2*   < > 2.3*   PROTTOTAL 6.4* 6.5* 6.9 6.4*   < > 6.6*   BILITOTAL 0.7 0.6 0.7 0.7   < > 0.9   ALKPHOS 160* 175* 188* 178*   < > 190*   ALT 66 80* 104* 102*   < > 197*   * 164* 197* 215*   < > 368*    < > = values in this interval not displayed.     TTE 2/15/2021  Interpretation Summary  HM 3 at 5500 RPM  LV size is small, LVIDd = 3.0 cm. Severely reduced left ventricular function.  Doppler of the inflow cannula and outflow graft are normal.  RV is severely dilated. Severely reduced right ventricular function.  Mild TR is present.  The aortic valve is closed with mild regurgitation.  IVC is dilated and patient is on a ventilator.  No pericardial effusion present.

## 2021-02-24 NOTE — PROGRESS NOTES
LVAD Social Work Services Progress Note      Date of Initial Social Work Evaluation: 2/16/2021  Collaborated with: Pt and wife, Chapis, at bedside, [facility], Belinda Up ([phone]), VAD coordinator     Data: Pt with hx of LVAD implant 2/18/2020. Pt admitted 2/16/2021 from OSH for cardiogenic and septic shock and multiorgan failure. Pt was extubated 2/20. Pt had tunneled catheter placed on 2/23.   Writer working with pt and wife with discharge planning and potential need for dialysis. They understand we have to look into community dialysis placement at this time even though they are hopeful for renal recovery. There are currently no dialysis centers near pt's home trained to take an LVAD pt. Writer initially made referral to [facility] ([phone] f: [phone]) and facility has a long waiting list, but ask that writer fax referral information as ultimately pt would want to be at this unit as it is closer to home. [place] location referred writer to [facility] ([phone] f: [phone]). Spoke to unit manager, Belinda Up, and they will look at referral. Their unit has taken an LVAD pt many years ago and would need to undergo training. The unit manager estimates that the earliest their unit would be ready to accept pt would be April.   Writer had discussion with pt and wife that they will likely need to relocate to the [facility] until [facility] is trained and ready to accept pt. Writer will look at making a local referral as well.  Discussed case with [facility] as pt likely will need rehab once medically ready for discharge. Although today pt reports he is moving much better.   Intervention: Discharge Planning     Assessment: Complex discharge planning due to LVAD and pt potentially requiring dialysis. Today, pt and wife are encouraged that pt did not require dialysis today. They remain hopeful that pt will not require outpatient dialysis. Referral  initiated to [facility]. This facility will need LVAD training and would not anticipate to be ready to accept pt until the earliest April. Difficult situation for pt as he is now looking at likely needing to relocate to [facility] to dialyze until [facility] is trained and ready to accept him. Explored with them where in the [facility] they would prefer to dialyze, but understandably overwhelmed with situation. Wife is checking to see if she is eligible for FMLA.   Education provided by SW: Ongoing Social Work support  Plan:    Discharge Plans in Progress: [facility] following, Dialysis referral to [facility] initiated    Barriers to d/c plan: Dialysis placement     Follow up Plan: SW will continue to work with [facility] and initiate referral locally in next day or so.

## 2021-02-24 NOTE — PROGRESS NOTES
D: Called patient/caregiver for routine virtual VAD Coordinator rounding (r/t COVID-19). No VAD related questions or concerns at this time.  I: Discussed POC and provided support and listened to patient and care giver's thoughts and concerns.  P: Continue to follow patient and address any questions or concerns patient and or caregiver may have.

## 2021-02-25 ENCOUNTER — APPOINTMENT (OUTPATIENT)
Dept: PHYSICAL THERAPY | Facility: CLINIC | Age: 66
DRG: 870 | End: 2021-02-25
Attending: INTERNAL MEDICINE
Payer: MEDICARE

## 2021-02-25 LAB
ALBUMIN SERPL-MCNC: 2.3 G/DL (ref 3.4–5)
ALBUMIN SERPL-MCNC: 2.4 G/DL (ref 3.4–5)
ALP SERPL-CCNC: 146 U/L (ref 40–150)
ALP SERPL-CCNC: 150 U/L (ref 40–150)
ALT SERPL W P-5'-P-CCNC: 61 U/L (ref 0–70)
ALT SERPL W P-5'-P-CCNC: 65 U/L (ref 0–70)
ANION GAP SERPL CALCULATED.3IONS-SCNC: 10 MMOL/L (ref 3–14)
ANION GAP SERPL CALCULATED.3IONS-SCNC: 8 MMOL/L (ref 3–14)
ANISOCYTOSIS BLD QL SMEAR: ABNORMAL
APTT PPP: 45 SEC (ref 22–37)
AST SERPL W P-5'-P-CCNC: 100 U/L (ref 0–45)
AST SERPL W P-5'-P-CCNC: 112 U/L (ref 0–45)
BASE DEFICIT BLDV-SCNC: 3 MMOL/L
BASOPHILS # BLD AUTO: 0 10E9/L (ref 0–0.2)
BASOPHILS NFR BLD AUTO: 0 %
BILIRUB SERPL-MCNC: 0.5 MG/DL (ref 0.2–1.3)
BILIRUB SERPL-MCNC: 0.5 MG/DL (ref 0.2–1.3)
BUN SERPL-MCNC: 44 MG/DL (ref 7–30)
BUN SERPL-MCNC: 71 MG/DL (ref 7–30)
CALCIUM SERPL-MCNC: 7.6 MG/DL (ref 8.5–10.1)
CALCIUM SERPL-MCNC: 8.2 MG/DL (ref 8.5–10.1)
CHLORIDE SERPL-SCNC: 98 MMOL/L (ref 94–109)
CHLORIDE SERPL-SCNC: 99 MMOL/L (ref 94–109)
CO2 SERPL-SCNC: 24 MMOL/L (ref 20–32)
CO2 SERPL-SCNC: 25 MMOL/L (ref 20–32)
CREAT SERPL-MCNC: 3.78 MG/DL (ref 0.66–1.25)
CREAT SERPL-MCNC: 5.74 MG/DL (ref 0.66–1.25)
DACRYOCYTES BLD QL SMEAR: SLIGHT
DIFFERENTIAL METHOD BLD: ABNORMAL
EOSINOPHIL # BLD AUTO: 0.2 10E9/L (ref 0–0.7)
EOSINOPHIL NFR BLD AUTO: 1.7 %
ERYTHROCYTE [DISTWIDTH] IN BLOOD BY AUTOMATED COUNT: 22.8 % (ref 10–15)
GFR SERPL CREATININE-BSD FRML MDRD: 15 ML/MIN/{1.73_M2}
GFR SERPL CREATININE-BSD FRML MDRD: 9 ML/MIN/{1.73_M2}
GLUCOSE BLDC GLUCOMTR-MCNC: 111 MG/DL (ref 70–99)
GLUCOSE BLDC GLUCOMTR-MCNC: 132 MG/DL (ref 70–99)
GLUCOSE BLDC GLUCOMTR-MCNC: 144 MG/DL (ref 70–99)
GLUCOSE SERPL-MCNC: 120 MG/DL (ref 70–99)
GLUCOSE SERPL-MCNC: 136 MG/DL (ref 70–99)
HCO3 BLDV-SCNC: 22 MMOL/L (ref 21–28)
HCT VFR BLD AUTO: 22.5 % (ref 40–53)
HGB BLD-MCNC: 6.9 G/DL (ref 13.3–17.7)
HGB BLD-MCNC: 7.3 G/DL (ref 13.3–17.7)
INR PPP: 2.52 (ref 0.86–1.14)
LDH SERPL L TO P-CCNC: 318 U/L (ref 85–227)
LYMPHOCYTES # BLD AUTO: 0.7 10E9/L (ref 0.8–5.3)
LYMPHOCYTES NFR BLD AUTO: 7 %
MCH RBC QN AUTO: 23.1 PG (ref 26.5–33)
MCHC RBC AUTO-ENTMCNC: 30.7 G/DL (ref 31.5–36.5)
MCV RBC AUTO: 75 FL (ref 78–100)
METAMYELOCYTES # BLD: 0.1 10E9/L
METAMYELOCYTES NFR BLD MANUAL: 0.9 %
MICROCYTES BLD QL SMEAR: PRESENT
MONOCYTES # BLD AUTO: 0.9 10E9/L (ref 0–1.3)
MONOCYTES NFR BLD AUTO: 9.6 %
NEUTROPHILS # BLD AUTO: 7.7 10E9/L (ref 1.6–8.3)
NEUTROPHILS NFR BLD AUTO: 80.8 %
O2/TOTAL GAS SETTING VFR VENT: 21 %
OXYHGB MFR BLDV: 48 %
PCO2 BLDV: 39 MM HG (ref 40–50)
PH BLDV: 7.36 PH (ref 7.32–7.43)
PLATELET # BLD AUTO: 239 10E9/L (ref 150–450)
PLATELET # BLD EST: ABNORMAL 10*3/UL
PO2 BLDV: 30 MM HG (ref 25–47)
POIKILOCYTOSIS BLD QL SMEAR: SLIGHT
POTASSIUM SERPL-SCNC: 3.8 MMOL/L (ref 3.4–5.3)
POTASSIUM SERPL-SCNC: 4.6 MMOL/L (ref 3.4–5.3)
PROT SERPL-MCNC: 6.4 G/DL (ref 6.8–8.8)
PROT SERPL-MCNC: 6.4 G/DL (ref 6.8–8.8)
RBC # BLD AUTO: 2.99 10E12/L (ref 4.4–5.9)
SODIUM SERPL-SCNC: 132 MMOL/L (ref 133–144)
SODIUM SERPL-SCNC: 132 MMOL/L (ref 133–144)
WBC # BLD AUTO: 9.5 10E9/L (ref 4–11)

## 2021-02-25 PROCEDURE — 250N000013 HC RX MED GY IP 250 OP 250 PS 637: Performed by: INTERNAL MEDICINE

## 2021-02-25 PROCEDURE — 250N000013 HC RX MED GY IP 250 OP 250 PS 637: Performed by: STUDENT IN AN ORGANIZED HEALTH CARE EDUCATION/TRAINING PROGRAM

## 2021-02-25 PROCEDURE — 99232 SBSQ HOSP IP/OBS MODERATE 35: CPT | Mod: GC | Performed by: INTERNAL MEDICINE

## 2021-02-25 PROCEDURE — 85610 PROTHROMBIN TIME: CPT | Performed by: INTERNAL MEDICINE

## 2021-02-25 PROCEDURE — 85730 THROMBOPLASTIN TIME PARTIAL: CPT | Performed by: INTERNAL MEDICINE

## 2021-02-25 PROCEDURE — 99232 SBSQ HOSP IP/OBS MODERATE 35: CPT | Performed by: INTERNAL MEDICINE

## 2021-02-25 PROCEDURE — 82805 BLOOD GASES W/O2 SATURATION: CPT | Performed by: STUDENT IN AN ORGANIZED HEALTH CARE EDUCATION/TRAINING PROGRAM

## 2021-02-25 PROCEDURE — 97161 PT EVAL LOW COMPLEX 20 MIN: CPT | Mod: GP | Performed by: PHYSICAL THERAPIST

## 2021-02-25 PROCEDURE — 999N001017 HC STATISTIC GLUCOSE BY METER IP

## 2021-02-25 PROCEDURE — 85018 HEMOGLOBIN: CPT | Performed by: STUDENT IN AN ORGANIZED HEALTH CARE EDUCATION/TRAINING PROGRAM

## 2021-02-25 PROCEDURE — 93010 ELECTROCARDIOGRAM REPORT: CPT | Performed by: INTERNAL MEDICINE

## 2021-02-25 PROCEDURE — 80053 COMPREHEN METABOLIC PANEL: CPT | Performed by: INTERNAL MEDICINE

## 2021-02-25 PROCEDURE — 97530 THERAPEUTIC ACTIVITIES: CPT | Mod: GP | Performed by: PHYSICAL THERAPIST

## 2021-02-25 PROCEDURE — 250N000011 HC RX IP 250 OP 636: Performed by: STUDENT IN AN ORGANIZED HEALTH CARE EDUCATION/TRAINING PROGRAM

## 2021-02-25 PROCEDURE — 214N000001 HC R&B CCU UMMC

## 2021-02-25 PROCEDURE — 90937 HEMODIALYSIS REPEATED EVAL: CPT

## 2021-02-25 PROCEDURE — 83615 LACTATE (LD) (LDH) ENZYME: CPT | Performed by: INTERNAL MEDICINE

## 2021-02-25 PROCEDURE — 258N000003 HC RX IP 258 OP 636: Performed by: STUDENT IN AN ORGANIZED HEALTH CARE EDUCATION/TRAINING PROGRAM

## 2021-02-25 PROCEDURE — 85025 COMPLETE CBC W/AUTO DIFF WBC: CPT | Performed by: INTERNAL MEDICINE

## 2021-02-25 PROCEDURE — 99233 SBSQ HOSP IP/OBS HIGH 50: CPT | Mod: GC | Performed by: INTERNAL MEDICINE

## 2021-02-25 PROCEDURE — 93005 ELECTROCARDIOGRAM TRACING: CPT

## 2021-02-25 RX ORDER — WARFARIN SODIUM 1 MG/1
2 TABLET ORAL
Status: DISCONTINUED | OUTPATIENT
Start: 2021-02-25 | End: 2021-02-25

## 2021-02-25 RX ORDER — WARFARIN SODIUM 1 MG/1
2 TABLET ORAL
Status: COMPLETED | OUTPATIENT
Start: 2021-02-25 | End: 2021-02-25

## 2021-02-25 RX ADMIN — CEFAZOLIN SODIUM 2 G: 2 INJECTION, SOLUTION INTRAVENOUS at 19:51

## 2021-02-25 RX ADMIN — INSULIN ASPART 1 UNITS: 100 INJECTION, SOLUTION INTRAVENOUS; SUBCUTANEOUS at 16:57

## 2021-02-25 RX ADMIN — DOCUSATE SODIUM 50 MG AND SENNOSIDES 8.6 MG 2 TABLET: 8.6; 5 TABLET, FILM COATED ORAL at 08:15

## 2021-02-25 RX ADMIN — FLUOXETINE 30 MG: 20 CAPSULE ORAL at 08:16

## 2021-02-25 RX ADMIN — FINASTERIDE 5 MG: 5 TABLET, FILM COATED ORAL at 08:16

## 2021-02-25 RX ADMIN — Medication 1 CAPSULE: at 08:16

## 2021-02-25 RX ADMIN — AZITHROMYCIN MONOHYDRATE 500 MG: 250 TABLET ORAL at 08:28

## 2021-02-25 RX ADMIN — ASPIRIN 81 MG CHEWABLE TABLET 81 MG: 81 TABLET CHEWABLE at 08:15

## 2021-02-25 RX ADMIN — HYDRALAZINE HYDROCHLORIDE 50 MG: 50 TABLET ORAL at 16:41

## 2021-02-25 RX ADMIN — OMEPRAZOLE 20 MG: 20 CAPSULE, DELAYED RELEASE ORAL at 08:16

## 2021-02-25 RX ADMIN — OXYMETAZOLINE HYDROCHLORIDE 2 SPRAY: 0.05 SPRAY NASAL at 08:16

## 2021-02-25 RX ADMIN — ALLOPURINOL 100 MG: 100 TABLET ORAL at 08:15

## 2021-02-25 RX ADMIN — OXYMETAZOLINE HYDROCHLORIDE 2 SPRAY: 0.05 SPRAY NASAL at 19:47

## 2021-02-25 RX ADMIN — DOBUTAMINE HYDROCHLORIDE 3 MCG/KG/MIN: 200 INJECTION INTRAVENOUS at 21:13

## 2021-02-25 RX ADMIN — WARFARIN SODIUM 2 MG: 1 TABLET ORAL at 17:51

## 2021-02-25 RX ADMIN — SODIUM CHLORIDE 300 ML: 9 INJECTION, SOLUTION INTRAVENOUS at 13:49

## 2021-02-25 RX ADMIN — SODIUM CHLORIDE 250 ML: 9 INJECTION, SOLUTION INTRAVENOUS at 13:49

## 2021-02-25 RX ADMIN — TAMSULOSIN HYDROCHLORIDE 0.4 MG: 0.4 CAPSULE ORAL at 08:16

## 2021-02-25 RX ADMIN — HYDRALAZINE HYDROCHLORIDE 50 MG: 50 TABLET ORAL at 05:30

## 2021-02-25 RX ADMIN — HYDRALAZINE HYDROCHLORIDE 50 MG: 50 TABLET ORAL at 21:17

## 2021-02-25 RX ADMIN — ZOLPIDEM TARTRATE 5 MG: 5 TABLET, FILM COATED ORAL at 21:17

## 2021-02-25 ASSESSMENT — ACTIVITIES OF DAILY LIVING (ADL)
ADLS_ACUITY_SCORE: 14
ADLS_ACUITY_SCORE: 15
ADLS_ACUITY_SCORE: 15
ADLS_ACUITY_SCORE: 14
ADLS_ACUITY_SCORE: 14

## 2021-02-25 NOTE — PLAN OF CARE
D/I/A: Pt here w/ cardiogenic and distributive shock with an intermittent wide complex tachycardia, BERNARDA, on new dialysis. Up w/ ax1. Sat up and stood up and walked to BR without issue. VAD #s remained WNL. Dressing changed, minimal drainage noted. Good appetite. PICC line intact, dialysis catheter site intact. Pt to dialysis for filtration run (no fluid removed).  P: Continue to monitor.

## 2021-02-25 NOTE — PROGRESS NOTES
.    Nephrology Progress Note  02/25/2021         Assessment & Recommendations:   64 yo M with PMHx  NICM  s/p HM III , VT, severe MR, atrial fibrillation on warfarin, hypertension, CKD IV, DMII, HIT presented to OSH with fevers and cough in the setting of syncopal episode transferred to [facility] for further cares. Hospitalization complicated by Afib with RVR with hypotension and intubation with upgrade of cares to the ICU. Found to be in septic shock 2/2 legionella pneumonia with possible cardiogenic shock. Nephrology was consulted for evaluation and management of anuric BERNARDA     Oligoanuric BERNARDA 2/2 ischemic ATN  Baseline Cr last yr s/p LVED placement was around 1. On admission ~2.3 and doubled within 24 hours with rapid decrease in UOP. UA unimpressive with baseline proteinuria and new granular casts. Initiated on RRT 2/16. Monitoring for renal recovery, however has not had adequate UOP to maintain volume status and Cr/BUN rising.   - HD today  - Monitor for recovery of renal function  - Avoid nephrotoxins as able  - Renally dose meds  - Renal will continue to follow    Volume status:   Euvolemic.       Electrolytes - stable   Acid/base - stable   Anemia - Hb stable 7.3 Iron def in 11/2020.       Recommendations were communicated to primary team via note     Seen and discussed with Dr. Elisha Ag MD  [phone]    Interval History :   Nursing and provider notes from last 24 hours reviewed.    No major acute overnight events. Continuing to make some urine, but only had ~475 over past 24 hours. No SOB, no peripheral edema, no chest pain. Disappointed that his kidneys are not yet recovered.       Review of Systems:   I reviewed the following systems:  GI: No nausea or vomiting or diarrhea.   Neuro:  No confusion  Constitutional:  No fever or chills  CV: No dyspnea or edema.    Physical Exam:   I/O last 3 completed shifts:  In: 1057.15 [P.O.:820; I.V.:237.15]  Out: 525 [Urine:525]   BP 95/85   Pulse 90   Temp  98.1  F (36.7  C) (Oral)   Resp 25   Wt 96.3 kg (212 lb 3.2 oz)   SpO2 99%   BMI 33.24 kg/m       General : Pt awake, not in acute distress   Lungs : anterior lung fields are clear  Cardiac : LVAD in place  Abdomen : Soft/ND/NT  LE : no Edema noted  Dialysis Access : R internal jugular catheter    Labs:   All labs reviewed by me  Electrolytes/Renal -   Recent Labs   Lab Test 02/25/21 0519 02/24/21 1855 02/24/21 0442 02/22/21  1539 02/22/21  1539 02/22/21  0353 02/22/21  0353 02/21/21  1617 02/21/21  1617   * 131* 132*   < >  --    < > 136   < >  --    POTASSIUM 4.6 4.7 4.1   < >  --    < > 4.7   < >  --    CHLORIDE 98 98 99   < >  --    < > 106   < >  --    CO2 24 23 25   < >  --    < > 24   < >  --    BUN 71* 59* 44*   < >  --    < > 30   < >  --    CR 5.74* 5.03* 3.84*   < >  --    < > 1.94*   < >  --    * 161* 125*   < >  --    < > 109*   < >  --    CALOS 7.6* 7.9* 8.0*   < >  --    < > 8.1*   < >  --    MAG  --   --   --   --  2.6*  --  2.5*  --  2.6*   PHOS  --   --   --   --  5.2*  --  4.4  --  4.5    < > = values in this interval not displayed.       CBC -   Recent Labs   Lab Test 02/25/21  0915 02/25/21 0519 02/24/21 0442 02/23/21  0311   WBC  --  9.5 10.8 13.5*   HGB 7.3* 6.9* 7.8* 8.2*   PLT  --  239 244 249       LFTs -   Recent Labs   Lab Test 02/25/21  0519 02/24/21 1855 02/24/21 0442   ALKPHOS 146 168* 160*   BILITOTAL 0.5 0.6 0.7   ALT 65 94* 66   * 132* 146*   PROTTOTAL 6.4* 6.7* 6.4*   ALBUMIN 2.4* 2.5* 2.2*       Iron Panel -   Recent Labs   Lab Test 11/16/20  0616 02/08/20  0408   IRON 16* 25*   IRONSAT 6* 8*   HEAVENLY 75 189         Imaging:    Current Medications:    allopurinol  100 mg Oral or Feeding Tube Daily     aspirin  81 mg Oral Daily     ceFAZolin  2 g Intravenous Daily     finasteride  5 mg Oral Daily     FLUoxetine  30 mg Oral Daily     hydrALAZINE  50 mg Oral or Feeding Tube Q8H BRITTANI     insulin aspart  1-7 Units Subcutaneous TID AC     insulin aspart  1-5  Units Subcutaneous At Bedtime     melatonin  3 mg Oral At Bedtime     multivitamin RENAL  1 capsule Oral Daily     omeprazole  20 mg Oral QAM AC     oxymetazoline  2 spray Both Nostrils BID     senna-docusate  2 tablet Oral BID     tamsulosin  0.4 mg Oral Daily     warfarin ANTICOAGULANT  2 mg Oral ONCE at 18:00       dextrose       dextrose Stopped (02/17/21 1600)     DOBUTamine 3 mcg/kg/min (02/25/21 0809)     Warfarin Therapy Reminder       Wendy Autumn Ag MD     I have seen and examined this patient with the fellow.  This note reflects our joint assessment and plan.     Anila Tello MD

## 2021-02-25 NOTE — PROGRESS NOTES
LVAD Social Work Services Progress Note      Date of Initial Social Work Evaluation: 2/16/2021  Collaborated with: Pt's wife, Chapis, at bedside     Data: Pt with hx of LVAD implant 2/18/2020. Pt admitted 2/16/2021 from OSH for cardiogenic and septic shock and multiorgan failure. Pt was extubated 2/20. Pt had tunneled catheter placed on 2/23.   Referral has been initiated to [facility] however it will take until at least April due to their staff needing to be LVAD trained. Discussed with pt's wife again need to make referral locally; pt was at dialysis during writer's visit. Wife tearful, but accepting that writer has to make referral locally. Wife anticipates that they will not be able to relocate as she has to work and they have their 14yo granddaughter that they care for and will plan to drive possibly up to 4hrs one way for dialysis three times per week. Writer has initiated referrals through [facility] ([facility] 2hrs 1/2hrs) and [facility] ([facility]-1 1/2 hrs)  trying to get as close to pt's home as possible; will move closer to the [facility] if these out [facility] facilities are not willing to train on the LVAD.     Intervention: Supportive Visit   Assessment: Wife tearful today as we continue discussions around outpatient dialysis plans. Continues to be optimistic that pt will not need dialysis, but understands that we need to continue discharge planning and identifying an outpatient dialysis center.   Education provided by SW: Ongoing Social Work support, Discharge Planning   Plan:    Discharge Plans in Progress: Referrals initiated for outpatient dialysis-[facility], [facility] [place] and [facility]    [facility] following-will not accept until outpatient dialysis is confirmed    Barriers to d/c plan: Outpatient Dialysis     Follow up Plan: SW to continue to follow

## 2021-02-25 NOTE — PROGRESS NOTES
02/25/21 0930   Quick Adds   Type of Visit Initial PT Evaluation       Present no   Living Environment   People in home grandchild(arnold);spouse  (16yo granddaughter; 6yo granddaughter visits often)   Current Living Arrangements house   Home Accessibility stairs to enter home;stairs within home   Number of Stairs, Main Entrance 4   Stair Railings, Main Entrance railings on both sides of stairs   Number of Stairs, Within Home, Primary 10  (two flights of 10 stairs)   Stair Railings, Within Home, Primary railing on left side (ascending)   Transportation Anticipated car, drives self;family or friend will provide  (spouse can assist)   Living Environment Comments 3-story house; 16yo granddaughter occupies upstairs; rarely takes stairs in house since all needs are met on main level   Self-Care   Usual Activity Tolerance moderate   Current Activity Tolerance fair   Regular Exercise No   Equipment Currently Used at Home walker, rolling;cane, straight;shower chair   Activity/Exercise/Self-Care Comment Usual activity is walking to garage and standing at bench; only uses 4WW occasionally in house; will bring cane for longer distances (doctor's appts)   Disability/Function   Hearing Difficulty or Deaf yes   Patient's preferred means of communication English speaker with hearing loss, no speech problems.   Describe hearing loss bilateral hearing loss   Use of hearing assistive devices bilateral hearing aids   Wear Glasses or Blind yes   Vision Management glasses   Concentrating, Remembering or Making Decisions Difficulty no   Difficulty Communicating no   Difficulty Eating/Swallowing no   Walking or Climbing Stairs Difficulty no   Dressing/Bathing Difficulty no   Toileting issues no   Doing Errands Independently Difficulty (such as shopping) yes   Errands Management drives self or spouse will assist   Fall history within last six months yes   Number of times patient has fallen within last six months 1  "  Change in Functional Status Since Onset of Current Illness/Injury yes   General Information   Onset of Illness/Injury or Date of Surgery 02/15/21   Referring Physician Nader Cisneros MD   Patient/Family Therapy Goals Statement (PT) to return to PLOF; go home   Pertinent History of Current Problem (include personal factors and/or comorbidities that impact the POC) Per chart: \"65 year old male admitted on 2/15/2021 with mixed cardiogenic and distributive shock with intermittent wide-complex tachycardia c/b hypoxemia requiring intubation, BERNARDA, and legionella pneumonia\"   Existing Precautions/Restrictions fall   Weight-Bearing Status - LUE full weight-bearing   Weight-Bearing Status - RUE full weight-bearing   Weight-Bearing Status - LLE full weight-bearing   Weight-Bearing Status - RLE full weight-bearing   General Observations Activity: up ad tiffanie   Cognition   Cognitive Status Comments able to respond appropriately to questions; no deficits noted   Pain Assessment   Patient Currently in Pain No   Integumentary/Edema   Integumentary/Edema other (describe)   Integumentary/Edema Comments  minor LE swelling due to fluid retention from chronic kidney issues   Posture    Posture Protracted shoulders;Forward head position   Range of Motion (ROM)   ROM Quick Adds ROM WFL   Strength Comprehensive (MMT)   General Manual Muscle Testing (MMT) Assessment no strength deficits identified   Strength   Strength Comments 4-/5 for B hip flexion; 5/5 for knee and ankle MMT   Bed Mobility   Comment (Bed Mobility) did not assess   Transfers   Transfer Safety Comments STS with MODA-MINAx1 and FWW   Gait/Stairs (Locomotion)   Comment (Gait/Stairs) CGA with FWW, short stride length and slow gait speed   Balance   Balance other (describe)   Balance Comments unsteadiness with STS, minor LOB to R with marching in standing   Sensory Examination   Sensory Perception patient reports no sensory changes   Coordination   Coordination no deficits were " identified   Muscle Tone   Muscle Tone no deficits were identified   Clinical Impression   Criteria for Skilled Therapeutic Intervention yes, treatment indicated   PT Diagnosis (PT) LE weakness; deconditioning   Influenced by the following impairments proximal LE weakness; balance deficits   Functional limitations due to impairments impaired transfers; impaired gait;   Clinical Presentation Stable/Uncomplicated   Clinical Presentation Rationale Pt's main medical concerns are being treated and evaluated, and pt's medical status is not anticipated to significantly affect mobility.   Clinical Decision Making (Complexity) low complexity   Therapy Frequency (PT) 6x/week   Predicted Duration of Therapy Intervention (days/wks) 7-10 days   Planned Therapy Interventions (PT) balance training;gait training;patient/family education;stair training;strengthening;transfer training   Risk & Benefits of therapy have been explained evaluation/treatment results reviewed;care plan/treatment goals reviewed;participants voiced agreement with care plan;participants included;patient;spouse/significant other   PT Discharge Planning    PT Discharge Recommendation (DC Rec) Acute Rehab Center-Motivated patient will benefit from intensive, interdisciplinary therapy.  Anticipate will be able to tolerate 3 hours of therapy per day   PT Rationale for DC Rec Pt is currently below functional baseline and would benefit from continued therapy to increase strength, balance, transfers and progress gait. Recommend ARU due pt's lower age, good spousal support, motivation and compliance with PT, and has adequate goals that can be addressed at ARU. Discussed possibility of rehab facility after hospital with pt and spouse and voiced agreement with need for therapy. If pt shows lack of progress with mobility in further PT sessions, recommendations could change to TCU.   PT Brief overview of current status  Ax1 for transfers and ambulation with gait belt and  FWW   Total Evaluation Time   Total Evaluation Time (Minutes) 10

## 2021-02-25 NOTE — PROGRESS NOTES
Calorie Count  Intake recorded for: 2/24  Total Kcals: 741 Total Protein: 40g  Kcals from Hospital Food: 741  Protein: 70g  Kcals from Outside Food (average):0 Protein: 0g  # Meals Ordered from Kitchen: 3 meals   # Meals Recorded: 1 meal (First - 100% omelet w/ cheese, mushrooms & onion, hot tea)  # Supplements Recorded: 100% 1 Boost Plus

## 2021-02-25 NOTE — PLAN OF CARE
D-Admitted on 02/15/21 with cardiogenic/ distributive shock. HM 3 LVAD placed in 2020 for NICM. See flow sheets for vs and assessments. Pt had nose   bleed this evening (See provider notification note) Spouse at bedside, supportive.  I-Dobutamine infusing at 3 mcg/kg/min. Continues on IV antibiotics.  A-Telemetry shows ST. No further nosebleeds.  P-Continue with current poc.

## 2021-02-25 NOTE — DISCHARGE SUMMARY
[facility]    Cardiology Discharge Summary- Cards 2       Date of Admission:  2/15/2021  Date of Discharge:  {DISCHARGE DATE:438290}  Discharging Provider: Dr. Jiang    Discharge Diagnoses   Cardiogenic Shock  Septic Shock  Acute Renal Failure Requiring Dialysis    Follow-ups Needed After Discharge     Ongoing evaluation of renal recovery  Nephrology follow up for ongoing dialysis needs    Discharge Disposition   Discharged to rehabilitation facility  Condition at discharge: Good    Hospital Course    Eliseo Tanner is a 65 year old male with PMHx relevant for NICM with LVEF 15-20%, NYHA III s/p HM III 2/18/2020 (post op complicated by retrosternal hematoma with bleeding in the lungs), s/p ICD placed on 3/16/2020, h/o MSSA driveline infection and bacteremia 11/5/2020 on prophylactic cephalexin, h/o VT on amiodarone, moderate nonobstructive CAD, severe MR, atrial fibrillation on warfarin, hypertension, ABHINAV requiring CPAP, CKD IV, DMII, HLP,  h/o HIT who presented to the Cardiovascular Unit (4E Cardiac ICU) with mixed cardiogenic and distributive shock withan intermittent wide complex tachycardia. Initially requiring vasopressors and inotropes, intubated for hypoxemia. Treated for legionella pneumonia. Stabilized on low dose dobutamine off of pressors. Extubated. With persistent oliguric renal failure now requiring dialysis.      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 Bhatti (ID clinic)   Patient with long standing history of NICM previously implanted LVAD (HM III) on 02/18/20 due to worsening functional status and systolic ventricular dysfunction (further complicated by RV  dysfunction by VAD hemodynamic compensatory mechanism, VT in ICU now on Amiodarone and Atrial Fibrillation with AVR requiring DCCV on 02/28/20).      Elevated cardiac biomarkers on presentation, SGC with only mildly elevated filling pressures. Euvolemic on exam. Troponin elevation likely related to demand ischemia with no concerns for ACS. Most recent VAD interrogation without any significant Flow-Alarms     LDH 8,531. Initial concern for LVAD thrombus. However given relatively stable LVAD parameters, degree of LDH elevation is out of proportion. Will continue to monitor for LVAD alarms.     # Legionella Pneumonia  Broad spectrum antibiotics course as per ID               Cefazolin (2/18/21 - 3/1/21) followed by PO cefalexin indefinitely               Azithromycin (2/15/21 - 2/25/21)               Cefepime (2/16/21 - 2/18/21) (deescalated to cefazolin)               Vancomycin (2/15/21 - 2/18/21) (MRSA nares negative)               Piperacillin tazobactam (2/15/21-2/16/21) (switched for renal protection)    # Wide complex tachycardia. Atrial Flutter vs Afib vs Ventricular Tachycardia  # History of Atrial Fibrillation s/p DCCV/history of Atrial Flutter       Wide complex tachycardia HR 140s on presentation in the setting of febrile illness and likely pneumonia. Did not initially respond to adenosine. Concern for VT. Intermittently back into HR 60s with underlying rhythm of atrial flutter 3:1 conduction block. Persistent tachycardia  over course of admission. EP consulted ***.     Consultations This Hospital Stay   CARDIAC REHAB IP CONSULT  NUTRITION SERVICES ADULT IP CONSULT  PHARMACY IP CONSULT  PHARMACY IP CONSULT  VASCULAR ACCESS CARE ADULT IP CONSULT  PHARMACY TO DOSE VANCO  PHARMACY TO DOSE WARFARIN  CARDIOLOGY ELECTROPHYSIOLOGY (EP) IP CONSULT  PHARMACY IP CONSULT  PHARMACY IP CONSULT  NEPHROLOGY ICU IP CONSULT  PHARMACY CRRT IP CONSULT  CARE MANAGEMENT / SOCIAL WORK IP CONSULT  INFECTIOUS DISEASE GENERAL  ADULT IP CONSULT  NUTRITION SERVICES ADULT IP CONSULT  PHARMACY IP CONSULT  PHARMACY IP CONSULT  NUTRITION SERVICES ADULT IP CONSULT  VASCULAR ACCESS FOR PICC PLACEMENT ADULT IP CONSULT  PHARMACY TO DOSE WARFARIN  PHYSICAL THERAPY ADULT IP CONSULT  OCCUPATIONAL THERAPY ADULT IP CONSULT  INTERNAL MEDICINE PROCEDURE TEAM ADULT IP CONSULT [facility] - DIALYSIS NON TUNNELED CENTRAL LINE PLACEMENT  INTERVENTIONAL RADIOLOGY ADULT/PEDS IP CONSULT  CARDIOLOGY ELECTROPHYSIOLOGY (EP) IP CONSULT  SMOKING CESSATION PROGRAM IP CONSULT    Code Status   Full Code      Daniel Fleming MD  [facility]  ______________________________________________________________________    Physical Exam   Vital Signs: Temp: 98.1  F (36.7  C) Temp src: Oral BP: 95/85 Pulse: 90   Resp: 25 SpO2: 99 % O2 Device: None (Room air)    Weight: 212 lbs 3.2 oz  {OPTIONAL -- recommend using personal SmartPhrase or Notewriter for exam    :[id number]}         Primary Care Physician   ALEKS BENSON    Discharge Orders   No discharge procedures on file.    Significant Results and Procedures   Most Recent 3 CBC's:  Recent Labs   Lab Test 02/25/21  0915 02/25/21  0519 02/24/21  0442 02/23/21  0311   WBC  --  9.5 10.8 13.5*   HGB 7.3* 6.9* 7.8* 8.2*   MCV  --  75* 75* 73*   PLT  --  239 244 249     Most Recent 3 BMP's:  Recent Labs   Lab Test 02/25/21  0519 02/24/21  1855 02/24/21  0442   * 131* 132*   POTASSIUM 4.6 4.7 4.1   CHLORIDE 98 98 99   CO2 24 23 25   BUN 71* 59* 44*   CR 5.74* 5.03* 3.84*   ANIONGAP 10 9 8   CALOS 7.6* 7.9* 8.0*   * 161* 125*     Most Recent 3 Troponin's:  Recent Labs   Lab Test 02/15/21  1910 01/05/21  1419 02/13/20  0206   TROPI 0.377* 0.103* 0.089*     Most Recent D-dimer:  Recent Labs   Lab Test 02/08/21  0821   DD 4.1*     Most Recent ESR & CRP:  Recent Labs   Lab Test 02/16/21  2219   SED 72*   .0*   ,   Results for orders placed or performed during the hospital encounter of  02/15/21   XR Chest Port 1 View    Narrative    Chest radiograph 2/15/2021 8:19 PM    HISTORY: Status post right IJ central line    COMPARISON: CT chest abdomen pelvis 2/8/2021    FINDINGS:  Single AP radiograph of the chest. Postoperative changes of LVAD with  median sternotomy wires. Left chest wall cardiac device with lead  overlying the right ventricle. Right IJ central line sheath tip  overlying the low right internal jugular vein/right innominate vein.  Trachea is midline. Cardiac silhouette is similar in size. No  pneumothorax. No significant right pleural effusion. Left costophrenic  angle is collimated out of view. New right mid lung and lateral right  basilar airspace opacities. Unchanged right midlung streaky opacity.      Impression    IMPRESSION:  1. New right IJ central line sheath tip overlying the low right  internal jugular vein/right innominate vein.  2. New right mid lung and lateral right basilar airspace opacities,  concerning for infection.    I have personally reviewed the examination and initial interpretation  and I agree with the findings.    DONG SOLORIO MD   CT Chest Abdomen Pelvis w/o Contrast     Value    Radiologist flags Concern for pneumonia (Urgent)    Narrative    EXAMINATION: CT CHEST ABDOMEN PELVIS W/O CONTRAST, 2/15/2021 11:33 PM    TECHNIQUE:  Helical CT images from the thoracic inlet through the  symphysis pubis were obtained  with contrast. Contrast dose:    COMPARISON: CT 2/8/2021    HISTORY: Sepsis; LVAD, looking for sepsis    FINDINGS:    Chest:     Left chest wall ICD with lead in the right ventricle. Right IJ sheath  tip terminates in the distal internal jugular vein. LVAD in place at  the cardiac apex with outflow tract entering the proximal ascending  aorta. Along the course of the LVAD driveline and out the outflow  track within the anterior-inferior mediastinum at the level of the  diaphragm, there is trace surrounding fluid collection (series 2 image  194),  is decreased from the prior examination. No significant  inflammatory changes about this collection. There is mild surrounding  inflammation within the subcutaneous fat and superior right rectus  abdominis musculature surrounding the drive line, similar to prior.    The visualized thyroid is unremarkable. Normal branching pattern of  the thoracic great vessels. Heart size is upper limits normal. The  main pulmonary artery and thoracic aorta are normal in caliber.  Interval increase in size of a high right paratracheal node measuring  up to 10 mm compared to 7 mm on prior exam. Multiple additional  scattered mediastinal nodes appear slightly increased in size compared  to prior exam 02/08/2021.    The central tracheobronchial tree is patent. Small right-sided pleural  effusion and trace left-sided pleural effusions. Scattered patchy  pulmonary consolidations, involving both lungs and greatest within the  posterior aspects of the right upper and lower lobes. No pneumothorax.    Abdomen and pelvis:     No focal hepatic lesion on today's noncontrast exam. Layering  hyperattenuating fluid within the gallbladder, may represent  gallbladder sludge. Spleen is unremarkable. Mild stable thickening of  the adrenal glands bilaterally. Fatty atrophy of the pancreas. A few  small scattered calcifications within the pancreas can be seen with  chronic pancreatitis. No renal mass or urinary tract stones. No  hydronephrosis.    Gastric distention with fluid content. The small and large bowel are  normal in caliber without evidence of a bowel obstruction. Colonic  diverticulosis without acute diverticulitis. Appendix is normal.     Diffuse mesenteric edema and small amount of ascites, which has  increased since prior exam 02/08/2021. Perihepatic fluid and a small  amount of presacral fluid identified. No discrete walled off fluid  collection. Mild diffuse thickening of the urinary bladder, similar to  prior. Prostate is unremarkable.  Symmetric seminal vesicles. No  intra-abdominal or pelvic lymphadenopathy. Normal caliber abdominal  aorta with mild to moderate atrophy, calcifications noted in the  origin of the celiac, and SMA arteries.    Bones and soft tissues: No acute or suspicious osseous lesion.  Postoperative changes of median sternotomy with intact sternotomy  wires. Degenerative changes of the spine. Grade 1 anterolisthesis of  L3 on L4 and L4 on L5, stable. There appears to be moderate to severe  canal stenosis at L4-L5 secondary to a disc osteophyte complex. Small  fat-containing inguinal hernia. Degenerative changes about the  bilateral hips with mineralized density seen within the surrounding  bursae.      Impression    IMPRESSION:   1. Diffuse patchy consolidations throughout both lungs concerning for  an acute infectious process. Interval increase in size of multiple  mediastinal lymph nodes measuring up to 10 mm, likely reactive.  2. Small right-sided pleural effusion and trace left-sided pleural  effusion.  3. Postsurgical changes of LVAD placement with mild inflammatory soft  tissue changes about the drive line traversing the subcutaneous fat  and superior right rectus abdominis musculature, concerning for drive  line infection, similar to prior.   4. Diffuse mesenteric edema and small amount of intra-abdominal  ascites, new from prior. Fluid within the abdomen and pelvis measures  low density.  5. Mild diffuse bladder wall thickening, this can be seen with  cystitis. Recommend clinical correlation.    [Urgent Result: Concern for pneumonia]    Dr. Fuentes was contacted by Dr. Oliver at 2/16/2021 1:31 AM and  verbalized understanding of the urgent finding.     I have personally reviewed the examination and initial interpretation  and I agree with the findings.    DEION CRUZ MD   XR Chest Port 1 View    Narrative    EXAM: XR CHEST PORT 1 VW  2/16/2021 2:25 AM     HISTORY:  intubated and swan yogesh       COMPARISON:  CT and  x-ray 2/15/2021    FINDINGS:   AP semiupright view of the chest. Endotracheal tube tip in the mid  thoracic trachea. Right IJ [place]-Chucky catheter tip projects over the  expected location of the interlobar right pulmonary artery. Left chest  wall ICD. Enteric tube sidehole projects over the stomach, tip outside  the field of view. LVAD. Intact median sternotomy wires. No  appreciable pneumothorax. Small right pleural effusion. Right greater  than left patchy pulmonary opacities. Small amount of fluid within the  right minor fissure. Osseous structures are unchanged.      Impression    IMPRESSION:   1. Endotracheal tube tip projects in the mid thoracic trachea.  2. Right IJ [place]-Chucky catheter tip projects in the expected location  of the right interlobar pulmonary artery. Additional support devices  as above.  3. Right greater than left patchy pulmonary opacities, consolidation  or atelectasis.  4. Small right pleural effusion.    I have personally reviewed the examination and initial interpretation  and I agree with the findings.    DEION CRUZ MD   XR Abdomen Port 1 View    Narrative    Exam: XR ABDOMEN PORT 1 VW, 2/16/2021 2:25 AM    Indication: og placement    Comparison: CT 2/15/2021    Findings:   Portable supine view the abdomen. Enteric tube tip and sidehole  project over the stomach. LVAD. [place]-Chucky catheter tip projects over  the expected location of the right interlobar pulmonary artery. Mild  gaseous distention of bowel visualized in the upper abdomen. No portal  venous gas. Degenerative changes of the spine.      Impression    Impression:  1. Enteric tube tip and sidehole project over the stomach.  2. Mild nonspecific gaseous distention of the bowel visualized in the  upper abdomen.    I have personally reviewed the examination and initial interpretation  and I agree with the findings.    DEION CRUZ MD   XR Chest Port 1 View    Narrative    EXAM: XR CHEST PORT 1 VW  2/16/2021 10:46 AM     HISTORY:   dialysis line placed       COMPARISON:  Chest x-ray 2/16/2021 2:15 AM    FINDINGS:   Semiupright portable AP view of the chest. Left IJ central venous  dialysis catheter with tip projecting in the mid SVC. Endotracheal  tube tip in the mid thoracic trachea. Right IJ [place]-Chucky catheter tip  at the junction of the right pulmonary and interlobar artery. Left  chest wall ICD with single intact lead projecting over the heart in  similar position. Enteric tube passes below the left hemidiaphragm and  out of field of view. LVAD. Postsurgical changes of the chest with  median sternotomy wires intact.    Trachea is midline. Cardiomediastinal silhouette is within normal  limits. No appreciable pneumothorax. No significant change to small  right pleural effusion. Similar right greater than left patchy  pulmonary opacities. Persistent small fluid in the minor fissure.      Impression    IMPRESSION:     1. New left IJ central venous dialysis catheter with tip projecting in  the mid SVC. Stable position of other support devices.  2. No significant change in the patchy pulmonary opacities,  consolidation and/or atelectasis  3. No significant change in small right pleural effusion.    I have personally reviewed the examination and initial interpretation  and I agree with the findings.    QUIQUE NICHOLS MD   CT Chest Abdomen Pelvis w/o Contrast    Narrative    EXAMINATION: CT CHEST ABDOMEN PELVIS W/O CONTRAST  2/16/2021 4:22 PM      CLINICAL HISTORY: Sepsis    COMPARISON: CT chest abdomen and pelvis 2/15/2021        PROCEDURE COMMENTS: CT of the chest, abdomen, and pelvis was performed  without contrast. Axial MIP  images of the chest, and coronal and  sagittal reformatted images of the chest, abdomen, and pelvis  obtained.    FINDINGS:    Chest:  Left IJ CVC with tip in the mid SVC. Right IJ [place]-Chucky catheter with  tip in the right interlobar pulmonary artery. The endotracheal tube is  in the midthoracic trachea. Stable position  of LVAD device with trace  fluid surrounding the outflow track in the anterior mediastinum. Left  chest wall ICD with lead in the right ventricle. Mild fat stranding in  the superior abdominal wall adjacent to the drive line , similar to  prior. Enteric tube terminates within the stomach.    The central tracheal bronchial tree is patent. Bilateral pleural  effusions, greater on the right, slightly increased from prior. No  pneumothorax. Scattered areas of patchy consolidative opacities  throughout the lungs, similar to appearance from prior. The heart is  mildly enlarged. No pericardial effusion. Moderate to severe coronary  artery calcifications. Normal caliber thoracic aorta and main  pulmonary artery. Conventional 3 vessel branching aortic arch.  Multiple prominent borderline enlarged mediastinal lymph nodes,  largest right paratracheal lymph node measuring up to 10 mm in short  axis (series 4 image 91).    Abdomen/pelvis:  Limited evaluation due to noncontrast examination. The liver is normal  in appearance. No focal hepatic mass. Biliary sludge. Diffuse atrophy  of the pancreas. Increased simple attenuating peripancreatic fluid.  Stable mild nodular thickening of the adrenal glands. The kidneys are  normal in appearance. No hydronephrosis or nephrolithiasis. Urinary  bladder is decompressed and contains a Guevara catheter. No abnormally  dilated loops of large or small bowel. Colonic diverticulosis without  evidence of acute diverticulitis. No free intraperitoneal air.  Increased diffuse intra-abdominal ascites. Stable hazy attenuation  within the mesentery. The infrarenal aorta is of normal caliber.  Aortoiliac atherosclerotic calcifications. No abnormally sized lymph  nodes within the abdomen or pelvis. Fat-containing left inguinal  hernia. Small left hydrocele.    Bones:   Post surgical changes of median sternotomy. Multilevel degenerative  changes of the spine. Grade 1 anterolisthesis of L3 on L4 and  L4-L5.  Marked spinal canal narrowing at L4-L5. Degenerative changes of the  hips and sacroiliac joints. No suspicious or aggressive appearing bone  lesions.      Impression    IMPRESSION:  1. Overall stable patchy consolidative pulmonary opacities, concerning  for pneumonia.  2. Stable prominent and borderline enlarged mediastinal lymph nodes,  favored to be reactive.  3. Mild increase in small bilateral pleural effusions, greater on the  right, with associated compressive atelectasis.  4. Overall stable LVAD drive line appearance in the superior abdominal  wall without discrete fluid collection or significant inflammation.  5. Mild increase in diffuse intra-abdominal ascites.    I have personally reviewed the examination and initial interpretation  and I agree with the findings.    QUIQUE NICHOLS MD   CT Head w/o Contrast    Narrative    CT HEAD W/O CONTRAST 2/16/2021 4:08 PM    History: Mental status change, unknown cause; Septic shock. Unclear  source     Comparison: CT head 1/5/2021    Technique: Using multidetector thin collimation dual-energy helical  acquisition technique, axial, coronal and sagittal CT images from the  skull base to the vertex were obtained without intravenous contrast.    Findings: There is no intracranial hemorrhage, mass effect, or midline  shift. Gray/white matter differentiation in both cerebral hemispheres  is preserved. Ventricles are proportionate to the cerebral sulci. The  basal cisterns are clear. Unchanged bilateral basal ganglia  calcifications, greatest on the left. The orbits are grossly  unremarkable.    The bony calvaria and the bones of the skull base are normal. The  visualized portions of the paranasal sinuses and mastoid air cells are  clear.     Partially visualized endotracheal tube and enteric tube.      Impression    Impression:  No acute intracranial pathology.     I have personally reviewed the examination and initial interpretation  and I agree with the  findings.    DEION MAURO MD   XR Chest Port 1 View    Narrative    EXAM: XR CHEST PORT 1 VW  2/17/2021 1:58 AM     HISTORY:  intubated and swan chucky       COMPARISON:  2/16/2021    FINDINGS:   AP semiupright view of the chest. Endotracheal tube tip in the mid  thoracic trachea. Enteric tube courses below the diaphragm with tip  outside the field of view. Left chest wall ICD with lead projecting  over the right ventricle. Postoperative changes of LVAD. Intact median  sternotomy wires and mediastinal surgical clips. Right IJ [place]-Chucky  catheter tip projects over the main pulmonary artery. Left IJ central  venous catheter tip projects at the proximal SVC.    Midline trachea. Cardiomediastinal silhouette is stable. No  appreciable pneumothorax. Small bilateral pleural effusions. No  significant change in the bilateral patchy pulmonary opacities  visualized upper abdomen is unremarkable..      Impression    IMPRESSION:   1. Right IJ [place]-Chucky catheter tip projects over the main pulmonary  artery. Additional support devices are stable.  2. Small bilateral pleural effusions.  3. No significant change in the bilateral patchy pulmonary opacities,  consolidation and/or atelectasis.    I have personally reviewed the examination and initial interpretation  and I agree with the findings.    OSITO BRAVO MD   XR Chest Port 1 View    Narrative    Exam: XR CHEST PORT 1 VW, 2/17/2021 10:45 AM    Indication: Post [place] position adjustment    Comparison: 2/17/2021    Findings:   Right internal jugular [place]-Chucky catheter tip projects over the right  main pulmonary artery. Left internal jugular central venous catheter  tip at the brachiocephalic confluence. Stable left chest wall  implantable cardiac defibrillator and LVAD. Median sternotomy wires.  Endotracheal tube tip at the mid to low thoracic trachea. Enteric tube  sidehole projects over the stomach. Stable enlarged cardiac  silhouette. The pulmonary vasculature is indistinct.  Low lung volumes  with streaky perihilar and medial bibasilar opacities. Possible trace  bilateral pleural effusions. No pneumothorax.      Impression    Impression: [place]-Ch[place] catheter tip projects at the mid right pulmonary  artery.    CHARLES HERNANDEZ, DO   XR Chest Port 1 View    Narrative    EXAM: XR CHEST PORT 1 VW  2/18/2021 1:10 AM     HISTORY:  intubated and swan chucky       COMPARISON:  2/17/2021    FINDINGS:   AP semiupright view of the chest. Endotracheal tube tip projects in  the mid thoracic trachea. Left IJ central venous catheter tip projects  over the proximal SVC. Right IJ [place]-Ch[place] catheter tip projects over  the right pulmonary artery. Enteric tube courses below the diaphragm  with tip outside the field of view. LVAD. Left chest wall  defibrillator with lead projecting over the right ventricle.  Postoperative changes with intact median sternotomy wires and  mediastinal surgical clips.    Midline trachea. Cardiomediastinal silhouette is stable. Low lung  volumes with unchanged streaky perihilar and bibasilar opacities.  Small bilateral pleural effusions. No appreciable pneumothorax.      Impression    IMPRESSION:   1. Stable support devices.  2. Low lung volumes with unchanged streaky perihilar and bibasilar  opacities, atelectasis, edema or consolidation.  3. Bilateral pleural effusions.    I have personally reviewed the examination and initial interpretation  and I agree with the findings.    TIERRA MILLER MD   US Abdomen Limited w Abd/Pelv Duplex Complete Port    Narrative    EXAMINATION: US ABDOMEN LIMITED WITH DOPPLER COMPLETE PORTABLE  2/18/2021 10:01 AM     COMPARISON: CT of the chest abdomen and pelvis dated 2/16/2021,  2/15/2021    HISTORY: Acute liver failure, please evaluate with Dopplers.    TECHNIQUE: The abdomen was scanned in standard fashion with  specialized ultrasound transducer(s) using both gray-scale, color  Doppler, and spectral flow techniques.    Findings:  Patient with  LVAD, and associated waveform abnormalities.    Liver: The liver measures 19.6 cm and demonstrates normal homogeneous  echotexture. No evidence of a focal hepatic mass.     Extrahepatic portal vein flow is antegrade at 39 cm/s.  Right portal vein flow is antegrade, measuring 29 cm/s.  Left portal vein flow is antegrade, measuring 24 cm/s.    Flow in the hepatic artery is towards the liver and:  33 cm/s peak systolic  0.71 resistive index.     Left hepatic artery: 25 cm/s, RI 0.68  Right hepatic artery: 29 cm/s, RI 0.67    The splenic vein is not visualized on this exam The left hepatic vein  is patent with pulsatile flow towards the IVC at 37 cm/s. Middle  hepatic vein is patent with pulsatile flow towards the IVC at 44 cm/s.  Right hepatic vein is patent with pulsatile flow towards the IVC at 34  cm/s. The IVC is patent with flow towards the heart.   The visualized  aorta is not dilated.    Gallbladder: There is no wall thickening, pericholecystic fluid, or  evidence for cholelithiasis    Bile Ducts: Both the intra- and extrahepatic biliary system are of  normal caliber.  The common bile duct measures 3 mm.    Pancreas: Not well visualized on this exam.    Kidneys: Both kidneys are of normal echotexture, without mass or  hydronephrosis.   Renal lengths: right- 10.4 cm,    Fluid: Trace perihepatic ascites.      Impression    Impression:   Patient with LVAD and associated venous and arterial blood flow  abnormalities.    No evidence of portal venous thrombosis.    Trace perihepatic ascites.    I have personally reviewed the examination and initial interpretation  and I agree with the findings.    ANTHONY HUERTA MD   XR Chest Port 1 View    Narrative    EXAM: XR CHEST PORT 1 VW  2/19/2021 1:00 AM     HISTORY:  intubated and swan yogesh       COMPARISON:  2/18/2021    FINDINGS:   AP semiupright view of the chest. Endotracheal tube tip projects in  the mid thoracic trachea. Left IJ central venous catheter tip  projects  over the proximal SVC. Right IJ [place]-Yogesh catheter tip projects over  the right pulmonary artery enteric tube courses below the diaphragm  with tip outside the field-of-view. LVAD. Left chest wall  defibrillator with stable lead positioning. Postoperative changes with  intact median sternotomy wires and mediastinal surgical clips.    Midline trachea. Cardiomediastinal silhouette is stable. Low lung  volumes with unchanged perihilar and bibasilar opacities. Small right  pleural effusion and small amount of fluid in the fissure. Left  effusion has improved. No appreciable pneumothorax.      Impression    IMPRESSION:   1. Stable support devices.  2. Low lung volumes with unchanged streaky perihilar and bibasilar  opacities, atelectasis, edema or consolidation.  3. Stable small right pleural effusion.     I have personally reviewed the examination and initial interpretation  and I agree with the findings.    KRYSTEN SOLIZ MD   XR Chest Port 1 View    Narrative    Exam: XR CHEST PORT 1 VW, 2/19/2021 3:13 PM    Indication: Verify PICC    Comparison: 2/19/2021    Findings:   Pulmonary arterial catheter, left IJ central venous catheter,  pacemaker and its leads, LVAD, endotracheal tube and enteric tube are  all stable. New right arm PICC tip in the low superior vena cava.    Heart within normal limits. Low lung volumes. Perihilar and lower lobe  opacities suggests atelectasis or edema.      Impression    Impression:   1. New right arm PICC tip in the low superior vena cava. Remainder the  support devices are stable.  2. Low lung volume with perihilar and lower lobe opacity suggests  atelectasis. Edema would have a similar appearance.    DEVON CHARLES MD   XR Chest Port 1 View    Narrative    EXAM: XR CHEST PORT 1 VW  2/20/2021 2:56 AM     HISTORY:  intubated and swan yogesh       COMPARISON:  2/19/2021    FINDINGS:   AP semiupright view of the chest. Endotracheal tube tip projects in  the mid/low thoracic  trachea approximately 2.3 cm above the lucio.  Right IJ [place]-Chucky catheter tip projects over the right pulmonary  artery. The left IJ central venous catheter, pacemaker and its leads,  LVAD and enteric tube all remain in a similar position. Intact median  sternotomy wires. Mediastinal surgical clips.    Cardiomediastinal silhouette is stable. Midline trachea. No  appreciable pneumothorax. Trace fluid in the right minor fissure.  Significant pleural effusion. Improved perihilar predominate and lower  lobe pulmonary opacities. Low lung volumes.       Impression    IMPRESSION:   1. Endotracheal tube tip projects in the mid/low thoracic trachea  approximately 2.3 cm above the lucio. Additional support devices are  stable.  2. Low lung volumes with mildly improved perihilar and lower lobe  opacities.    I have personally reviewed the examination and initial interpretation  and I agree with the findings.    JEREMIE JOE MD   XR Chest Port 1 View    Narrative    EXAM: XR CHEST PORT 1 VW  2/21/2021 1:48 AM     HISTORY:  intubated and swan chucky       COMPARISON:  2/20/2021.    FINDINGS:   AP portable view of the chest. Patient has been extubated. Left arm  PICC tip projects near the brachiocephalic confluence. Right IJ  Allerton-Chucky catheter tip projects over the proximal right pulmonary  artery. Right arm PICC tip projects over the right atrium. Left chest  wall defibrillator with stable lead positioning. LVAD. Intact median  sternotomy wires.    Midline trachea. Cardiomediastinal silhouette is stable. No  appreciable pneumothorax or significant pleural effusion. Low lung  volumes with no significant change in the mild perihilar and lower  lobe hazy opacities. No new focal airspace opacity. Visualized upper  abdomen is unremarkable.      Impression    IMPRESSION:   1. Patient has been extubated.  2. Allerton-Chucky catheter tip projects over the proximal main right  pulmonary artery. Additional support devices are stable.  3. Low  lung volumes with no significant change in the mild perihilar  and lower lobe opacities.    I have personally reviewed the examination and initial interpretation  and I agree with the findings.    JEREMIE JOE MD   XR Chest Port 1 View    Narrative    Chest radiograph 2/21/2021 11:22 AM    HISTORY: Verify PICC    COMPARISON: Same-day chest radiograph at 12:39 AM    FINDINGS:  Single AP view of the chest. Postoperative changes of LVAD with  unchanged median sternotomy wires. Left chest wall cardiac device with  lead in similar position. Left IJ central line tip overlying the  innominate confluence. Right IJ Otoe-Chucky catheter tip overlying the  right pulmonary artery. Right upper extremity PICC tip projects over  the high right atrium. Trachea is midline when accounting for rotated  patient positioning. Cardiac silhouette is similar in size. No  pneumothorax. Low lung volumes. No significant pleural effusion.  Mildly increased hazy right perihilar opacities with unchanged  bibasilar hazy opacities.      Impression    IMPRESSION:  1. Right upper extremity PICC tip overlying the high right atrium.  2. Remaining support devices in similar position.  3. Low lung volumes with mildly increased hazy right perihilar and  unchanged bibasilar opacities.    I have personally reviewed the examination and initial interpretation  and I agree with the findings.    JEREMIE JOE MD   XR Chest Port 1 View    Narrative    EXAM: XR CHEST PORT 1 VW  2/22/2021 1:58 AM     HISTORY:  Intubated and swan chucky       COMPARISON:  2/21/2021    FINDINGS:   Frontal radiograph of the chest. Interval removal of right IJ  Otoe-Chucky catheter. The left IJ CVC sheath, right upper extremity  PICC, LVAD, and left chest wall implanted cardiac device with cardiac  lead are all in stable position. Postsurgical changes of the chest is  unchanged median sternotomy wires. The cardiac silhouette size is  unchanged. No pneumothorax. No pleural effusion. Low  lung volumes with  slight increase in bibasilar and perihilar opacities.      Impression    IMPRESSION:   1. Interval removal of right IJ [place]-Chucky catheter. Additional support  devices are stable.  2. Low lung volumes with increased perihilar and bibasilar opacities.    I have personally reviewed the examination and initial interpretation  and I agree with the findings.    JEREMIE JOE MD   XR Chest Port 1 View    Narrative    Portable chest to/22/21 at 1034 hours    INDICATION: Verify dialysis line placement    COMPARISON: Earlier today at 0113 hours    FINDINGS: Heart size is normal. LVAD and implantable cardiac  defibrillator again noted. Median sternotomy. Right PICC line tip is  at the right atrium. Right IJ catheter sheath tip is in the confluence  of the right IJ and brachiocephalic veins. No visible pneumothorax.  Left IJ CVC sheath appears with its tip in the bridging innominate  vein.      Impression    IMPRESSION: Right IJ catheter sheath tip a confluence of internal  jugular and innominate veins. Other support tubes and devices,  including LVAD, again noted.    AIME GAY MD   IR CVC Tunnel Placement > 5 Yrs of Age    Narrative    Procedure: 2/23/2021.  1. Placement of 14.5 [place]ican, 23 cm, double lumen, tunneled central  venous catheter.    History: Patient needs dialysis.    Staff: Daquan Archibald M.D.    Fellow: Salbador Munoz D.O.    Monitoring: Patient was placed on continuous monitoring supervised by  the IR nursing staff and IR attending. Patient remained stable  throughout the procedure.    Medications:   1. Fentanyl 50 mcg IV  2. Versed 1 mg IV  3. 1% lidocaine 12 ml local anesthesia  4. Anticoagulant citrate, 2.5 mmol per lumen    Sedation time: 25 minutes.    Fluoroscopy time: 1.3 minutes.    IV contrast: None.    Consent: Informed written and verbal consent was obtained.    Procedure/Findings: The patient was placed supine on the fluoroscopy  table.  Ultrasound was used to  identify a patent right internal  jugular vein, although small nonocclusive filling defect was present,  and an image was saved.  The patient's right neck and chest were  prepped and draped in the usual sterile fashion. The dermatotomy site  was anesthetized with 1% lidocaine. A 5 mm incision was made with a #  11 blade, and bluntly dissected. Under ultrasound guidance, a .018  micropuncture needle was advanced into the right internal jugular  vein. Under fluoroscopic visualization, a 0.018 soft tipped wire was  advanced into the right atrium. Micropuncture needle was exchanged  over a wire for a 3-5 Bangladeshi dilator. Under fluoroscopy, the wire was  used to measure from the patient's right atrium to the skin. The wire  and inner 3 Bangladeshi dilator were removed, and under fluoroscopic  guidance, a 0.035 Bentson wire advanced through the right atrium and  into the inferior vena cava and secured.     The chest was anesthetized with lidocaine. A 5 mm incision was made  with a # 11 blade, a tunnel was created with a tunneling device, and  the catheter was pulled through. The dilator was exchanged over wire  for a 14 Bangladeshi dilator/peel-away sheath. The wire and dilator were  removed, and the 14.5 Bangladeshi, 23 cm, double lumen, catheter was  advanced through the peel-away sheath into the vessel and the  peel-away sheath removed. Fluoroscopy revealed the catheter tip to lie  at the mid right atrium.  The catheter flushed and aspirated freely  and was flushed with 2.5 mL anticoagulate citrate. The catheter was  secured with one 2-0 Ethilon retention suture and the site was  cleansed and dressed. The neck incision was cleansed and the skin  edges were approximated and glued. Images were saved throughout the  procedure. The procedure was well tolerated, with no immediate  complications.    Estimate blood loss: 5 cc.    Specimens: None.      Impression    Impression: Placement of right IJ, 14.5 Bangladeshi, 23 cm, double  lumen,  tunneled central venous catheter. Catheter is locked with  anticoagulant citrate and ready for immediate use.    Plan: Patient discharged to patient care unit 2A in stable condition.  The glued neck incision does not require dressings.  If the glue is  still adhered to the neck incision in 10 days, it may be gently  removed.  The chest incision should be kept clean, dry, and covered,  with daily dressing changes, for 3 days.  The catheter exit site  should not be submerged, and should be covered when showering/bathing.  The catheter retention suture may be removed in 2 weeks.    I, MORIAH DURAND MD, attest that I was present for all critical  portions of the procedure and was immediately available to provide  guidance and assistance during the remainder of the procedure.    I have personally reviewed the examination and initial interpretation  and I agree with the findings.    MORIAH DURAND MD   Echocardiogram Limited    Narrative    [id number]  [id number]  [id number]  [id number]^ROMAINE^BRANNON           [facility]  Echocardiography Laboratory  [address] [place] [id number]     Name: WOLFGANG LEACH  MRN: [id number]  : 1955  Study Date: 2021 07:52 AM  Age: 65 yrs  Gender: Male  Patient Location: [facility]  Reason For Study: CHF  Ordering Physician: BRANNON GAVIN  Referring Physician: SYSTEM, PROVIDER NOT IN  Performed By: Sj Guillen RDCS     BSA: 2.1 m2  Height: 67 in  Weight: 221 lb  HR: 94  BP: 103/85 mmHg  _____________________________________________________________________________  __        Procedure  Limited Portable Echo Adult.  _____________________________________________________________________________  __        Interpretation Summary  HM 3 at 5500 RPM  LV size is small, LVIDd = 3.0 cm. Severely reduced left ventricular function.  Doppler of the inflow cannula and outflow graft are normal.  RV is severely dilated. Severely reduced right  ventricular function.  Mild TR is present.  The aortic valve is closed with mild regurgitation.  IVC is dilated and patient is on a ventilator.  No pericardial effusion present.  _____________________________________________________________________________  __     _____________________________________________________________________________  __     MMode/2D Measurements & Calculations  IVSd: 1.2 cm  LVIDd: 3.0 cm  LVIDs: 2.8 cm  LVPWd: 1.0 cm  FS: 7.4 %  LV mass(C)d: 101.5 grams  LV mass(C)dI: 48.1 grams/m2  RWT: 0.68        Doppler Measurements & Calculations  PA acc time: 0.12 sec     _____________________________________________________________________________  __           Report approved by: Graciela Caballero 2021 09:13 AM      Echocardiogram Limited    Narrative    [id number]  [id number]  [id number]  [id number]^YADY^TIERRA           [facility]  Echocardiography Laboratory  [address] [place] [id number]     Name: WOLFGANG LEACH  MRN: [id number]  : 1955  Study Date: 2021 07:32 AM  Age: 65 yrs  Gender: Male  Patient [facility]  Reason For Study: Cardiomyopathy  Ordering Physician: TIERRA CONTEH  Referring Physician: SYSTEM, PROVIDER NOT IN  Performed By: Kari Felipe, RDCS     BSA: 2.1 m2  Height: 67 in  Weight: 213 lb  HR: 114  BP: 115/88 mmHg  _____________________________________________________________________________  __        Procedure  LVAD Echocardiogram with two-dimensional, color and spectral Doppler  performed.  _____________________________________________________________________________  __        Interpretation Summary  Technically difficult study.     HM III LVAD is in place at 5500 RPM. Left ventricular diastolic diameter is  4.3cm with a midline septum that has abnormal septal motion consistent with  prior sternotomy and/or conduction abnormalities. The aortic valve opens  intermitently and has mild aortic regurgitation.  The LVAD inflow velocities  are not well seen but out flow velocities are normal.     The left ventricular ejection fraction is difficult to quantify due to  difficulty of images but is grossly estimated at 15-20%.     Due to technically difficult images quantification of the right ventricle is  not possible however the right ventricular function appears at least moderate  to severely reduced and dilated grossly.     There is at least mild mitral regurgitation that is eccentric.     IVC diameter >2.1 cm collapsing <50% with sniff suggests a high RA pressure  estimated at 15 mmHg or greater.     Compared to prior imaging on 2/16 there is no significant changes noted though  inflow velocities are not well seen on todays' exam.  _____________________________________________________________________________  __        Left Ventricle  Left ventricular ejection fraction is difficult to quantify due to difficulty  of images but is grossly estimated at 15-20%. Unable to comment on wall  motion. Diastolic diameter is 4.3cm. Diastolic function not assessed due to  presence of LVAD. HM III LVAD is in place at 5500 RPM. Abnormal non-specific  septal motion is present. LVAD cannula was seen in the expected anatomic  position in the LV apex. LVAD outflow velocities are normal.     Right Ventricle  Moderate to severe right ventricular dilation is present. Due to technically  difficult images quantification of the right ventricle is not possible however  the right ventricular function appears at least moderate to severely reduced  and dilated grossly. Global right ventricular function is moderately reduced.  A pacemaker lead is noted in the right ventricle.     Atria  The atria cannot be assessed.     Mitral Valve  The mitral valve is normal. Mild mitral insufficiency is present. eccentric  mitral regurgitation.        Aortic Valve  Mild aortic insufficiency is present. Aortic valve opens intermitently.     Tricuspid  Valve  Tricuspid valve not well seen but was assesed with Doppler interrogation.  Trace tricuspid insufficiency is present. The peak velocity of the tricuspid  regurgitant jet is not obtainable.     Pulmonic Valve  pulmoniv valve is not well seen but appears to have at least mild pulmonic  regurgitation.     Vessels  IVC diameter >2.1 cm collapsing <50% with sniff suggests a high RA pressure  estimated at 15 mmHg or greater.     Compared to Previous Study  2/16 No significant changes are noted though inflow velocities are not well  seen on todays' exam.     _____________________________________________________________________________  __                       Report approved by: Graciela Ragland 02/22/2021 08:47 AM                 _____________________________________________________________________________  __      Cardiac Device Check - Inpatient     Value    Date Time Interrogation Session [id number]    Implantable Pulse Generator  Medtronic    Implantable Pulse Generator Model [id number] Visia AF MRI VR    Implantable Pulse Generator Serial Number [id number]    Type Interrogation Session In Clinic    Clinic Name [facility]    Implantable Pulse Generator Type Defibrillator    Implantable Pulse Generator Implant Date 20200316    Implantable Lead  Medtronic    Implantable Lead Model 6935M Sprint Quattro Secure S MRI SureScan    Implantable Lead Serial Number [id number]    Implantable Lead Implant Date 20200316    Implantable Lead Polarity Type Tripolar Lead    Implantable Lead Location Detail 1 UNKNOWN    Implantable Lead Special Function 62cm length    Implantable Lead Location Right Ventricle    Glenn Setting Mode (NBG Code) VVI    Glenn Setting Lower Rate Limit 40    Glenn Setting Hysterisis Rate DISABLED    Lead Channel Setting Sensing Polarity Bipolar    Lead Channel Setting Sensing Anode Location Right Ventricle    Lead Channel Setting Sensing Anode  Terminal Ring    Lead Channel Setting Sensing Cathode Location Right Ventricle    Lead Channel Setting Sensing Cathode Terminal Tip    Lead Channel Setting Sensing Sensitivity 0.3    Lead Channel Setting Pacing Polarity Bipolar    Lead Channel Setting Pacing Anode Location Right Ventricle    Lead Channel Setting Pacing Anode Terminal Ring    Lead Channel Setting Sensing Cathode Location Right Ventricle    Lead Channel Setting Sensing Cathode Terminal Tip    Lead Channel Setting Pacing Pulse Width 0.4    Lead Channel Setting Pacing Amplitude 2    Lead Channel Setting Pacing Capture Mode Adaptive    Zone Setting Type Category VF    Zone Setting Detection Interval 270    Zone Setting Detection Beats Numerator 30    Zone Setting Detection Beats Denominator 40    Zone Setting Type Category VT    Zone Setting Detection Interval Blank    Zone Setting Type Category VT    Zone Setting Detection Interval 460    Zone Setting Type Category VT    Zone Setting Detection Interval 500    Zone Setting Type Category ATRIAL_FIBRILLATION    Zone Setting Type Category AT/AF    Lead Channel Impedance Value 361    Lead Channel Impedance Value 285    Lead Channel Sensing Intrinsic Amplitude 8.75    Lead Channel Sensing Intrinsic Amplitude 9.5    Lead Channel Pacing Threshold Amplitude 0.75    Lead Channel Pacing Threshold Pulse Width 0.4    Battery Date Time of Measurements 23144190365494    Battery Status OK    Battery RRT Trigger 2.727    Battery Remaining Longevity 133    Battery Voltage 3.01    Capacitor Charge Type Reformation    Capacitor Last Charge Date Time 86925101448676    Capacitor Charge Time 3.823    Capacitor Charge Energy 18    Glenn Statistic Date Time Start 92876436241245    Glenn Statistic Date Time End 63004111008868    Glenn Statistic RV Percent Paced 0.01    Atrial Tachy Statistic Date Time Start 42674284524436    Atrial Tachy Statistic Date Time End 58719693379810    Atrial Tachy Statistic AT/AF [place] Percent 2.6     Therapy Statistic Recent Shocks Delivered 0    Therapy Statistic Recent Shocks Aborted 0    Therapy Statistic Recent ATP Delivered 0    Therapy Statistic Recent Date Time Start 26650483780220    Therapy Statistic Recent Date Time End 90176273817985    Therapy Statistic Total Shocks Delivered 0    Therapy Statistic Total Shocks Aborted 0    Therapy Statistic Total ATP Delivered 0    Therapy Statistic Total  Date Time Start 32411963886952    Therapy Statistic Total  Date Time End 34017186078374    Episode Statistic Recent Count 2    Episode Statistic Type Category AT/AF    Episode Statistic Recent Count 0    Episode Statistic Type Category SVT    Episode Statistic Recent Count 0    Episode Statistic Type Category VT    Episode Statistic Recent Count 0    Episode Statistic Type Category VF    Episode Statistic Recent Count 0    Episode Statistic Type Category VT    Episode Statistic Recent Count 0    Episode Statistic Type Category VT    Episode Statistic Recent Count 0    Episode Statistic Type Category VT    Episode Statistic Recent Date Time Start 86693915476948    Episode Statistic Recent Date Time End 86110278050623    Episode Statistic Recent Date Time Start 01121067357134    Episode Statistic Recent Date Time End 63615452560089    Episode Statistic Recent Date Time Start 77893972252078    Episode Statistic Recent Date Time End 42937094442536    Episode Statistic Recent Date Time Start 52868529252744    Episode Statistic Recent Date Time End 31302093972662    Episode Statistic Recent Date Time Start 38897771662622    Episode Statistic Recent Date Time End 59931873508731    Episode Statistic Recent Date Time Start 77467695843341    Episode Statistic Recent Date Time End 64923303475448    Episode Statistic Recent Date Time Start 64280271073550    Episode Statistic Recent Date Time End 58137107065512    Episode Statistic Total Count 225    Episode Statistic Type Category AT/AF    Episode Statistic Total Count 0     Episode Statistic Type Category SVT    Episode Statistic Total Count 0    Episode Statistic Type Category VT    Episode Statistic Total Count 0    Episode Statistic Type Category VF    Episode Statistic Total Count 0    Episode Statistic Type Category VT    Episode Statistic Total Count 0    Episode Statistic Type Category VT    Episode Statistic Total Count 0    Episode Statistic Type Category VT    Episode Statistic Total Date Time Start 88843406613042    Episode Statistic Total Date Time End 02222440726782    Episode Statistic Total Date Time Start 55351137082137    Episode Statistic Total Date Time End 14028398225193    Episode Statistic Total Date Time Start 73362677022906    Episode Statistic Total Date Time End 66005743436173    Episode Statistic Total Date Time Start 68339323816962    Episode Statistic Total Date Time End 01277719998295    Episode Statistic Total Date Time Start 78579721155366    Episode Statistic Total Date Time End 20210216112902    Episode Statistic Total Date Time Start 38832840036516    Episode Statistic Total Date Time End 54902581862023    Episode Statistic Total Date Time Start 04740343325674    Episode Statistic Total Date Time End 95211800301950    Episode Identifier 225    Episode Type Category Monitor    Episode Date Time [id number]    Episode Duration 480    Episode Identifier 224    Episode Type Category Monitor    Episode Date Time [id number]    Episode Duration 1,080    Episode Identifier 223    Episode Type Category Monitor    Episode Date Time [id number]    Episode Duration 720    Narrative    Pt seen on 4E for evaluation and iterative programming of a [employer],   single lead ICD, per MD orders. Presenting EGM= regular VS @ 95 bpm.   Normal ICD function. 2 episodes recorded as AF. The EGMs show slightly   irregular R-R intervals. OptiVol thoracic impedance suggests fluid   retention.  <0.1%. No short v-v intervals recorded. Lead trends appear   stable.  Battery estimates 11 years to PERLA. VT monitor zone changed from   176 bpm to 120 bpm to assess for ventricular arrhythmias. KRISTEN Dumont.    Single lead ICD    I have reviewed and interpreted the device interrogation, settings,   programming and nurse's summary. The device is functioning within normal   device parameters. I agree with the current findings, assessment and plan.       Discharge Medications   Current Discharge Medication List      CONTINUE these medications which have NOT CHANGED    Details   acetaminophen (TYLENOL) 325 MG tablet Take 2 tablets (650 mg) by mouth every 4 hours as needed for mild pain or fever  Qty: 100 tablet, Refills: 0    Associated Diagnoses: LVAD (left ventricular assist device) present (H)      allopurinol (ZYLOPRIM) 100 MG tablet 2 tablets (200 mg) by Oral or Feeding Tube route daily  Qty: 60 tablet, Refills: 1    Associated Diagnoses: LVAD (left ventricular assist device) present (H)      amiodarone (PACERONE) 200 MG tablet Take 1 tablet (200 mg) by mouth daily  Qty: 90 tablet, Refills: 3    Associated Diagnoses: LVAD (left ventricular assist device) present (H)      aspirin (ASA) 81 MG EC tablet Take 1 tablet (81 mg) by mouth daily  Qty: 90 tablet, Refills: 3    Associated Diagnoses: LVAD (left ventricular assist device) present (H)      atorvastatin (LIPITOR) 20 MG tablet Take 20 mg by mouth daily      bumetanide (BUMEX) 1 MG tablet Take 1 tablet (1 mg) by mouth daily  Qty: 90 tablet, Refills: 3    Associated Diagnoses: Nonischemic cardiomyopathy (H)      cephALEXin (KEFLEX) 500 MG capsule Take 1 capsule (500 mg) by mouth 4 times daily  Qty: 120 capsule, Refills: 1    Associated Diagnoses: 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  Qty: 30 tablet, Refills: 0     Associated Diagnoses: Voiding difficulty      FLUoxetine (PROZAC) 10 MG capsule Take 30 mg by mouth daily      hydrALAZINE (APRESOLINE) 50 MG tablet Take 2 tablets (100 mg) by mouth 3 times daily  Qty: 560 tablet, Refills: 3    Associated Diagnoses: Chronic systolic congestive heart failure (H)      insulin glargine (BASAGLAR KWIKPEN) 100 UNIT/ML pen Inject 10 Units Subcutaneous At Bedtime     Comments: If Basaglar is not covered by insurance, may substitute Lantus at same dose and frequency.        insulin pen needle (BD JAIME U/F) 32G X 4 MM miscellaneous       lisinopril (ZESTRIL) 10 MG tablet Take 1 tablet (10 mg) by mouth daily  Qty: 90 tablet, Refills: 3    Associated Diagnoses: LVAD (left ventricular assist device) present (H)      magnesium oxide (MAG-OX) 400 MG tablet 1 tablet (400 mg) by Oral or Feeding Tube route daily  Qty: 30 tablet, Refills: 1    Associated Diagnoses: LVAD (left ventricular assist device) present (H)      multivitamin w/minerals (THERA-VIT-M) tablet Take 1 tablet by mouth daily  Qty: 30 tablet, Refills: 1    Associated Diagnoses: LVAD (left ventricular assist device) present (H)      nitroGLYcerin (NITROSTAT) 0.4 MG sublingual tablet For chest pain place 1 tablet under the tongue every 5 minutes for 3 doses. If symptoms persist 5 minutes after 1st dose call 911.  Qty: 30 tablet, Refills: 0    Associated Diagnoses: Nonischemic cardiomyopathy (H)      omeprazole (PRILOSEC) 20 MG DR capsule Take 20 mg by mouth daily      senna-docusate (SENOKOT-S/PERICOLACE) 8.6-50 MG tablet Take 1 tablet by mouth 2 times daily as needed for constipation      tamsulosin (FLOMAX) 0.4 MG capsule Take 1 capsule (0.4 mg) by mouth daily This med helps with urinary retention  Qty: 30 capsule, Refills: 0    Associated Diagnoses: Voiding difficulty      warfarin ANTICOAGULANT (COUMADIN) 2 MG tablet Take 4 mg by mouth daily       zolpidem (AMBIEN) 5 MG tablet Take 5 mg by mouth nightly as needed for Insomnia            Allergies   Allergies   Allergen Reactions     Heparin      HIT screen positive 2/14/20, reflex DAVINA negative; however heme recommended treating as if positive  HIT screen negative 2/11/20     Oxycodone Itching and Other (See Comments)     Chlorhexidine Rash

## 2021-02-25 NOTE — PROGRESS NOTES
HEMODIALYSIS TREATMENT NOTE    Date: 2/25/2021  Time: 4:40 PM    Data:  Pre Wt: 96.3 kg (212 lb 4.9 oz)   Desired Wt: 95.3 kg   Post Wt: 96.3 kg (212 lb 4.9 oz)  Weight change: 0 kg  Ultrafiltration - Post Run Net Total Removed (mL): 0 mL  Vascular Access Status: CVC  patent  Dialyzer Rinse: Streaked  Total Blood Volume Processed: 27.56 L   Total Dialysis (Treatment) Time: 2.5   Dialysate Bath: K 2, Ca 3  Heparin: None    Lab:   No    Interventions:Assessment:  Tx complete. CVC utilized without complication. BP soft upon arrival to dialysis. LVAD monitor in place. No fluid obtained this tx d/t soft BP. PI at 4.3. Pt slept through tx. CVC lumens saline locked and clear guard caps applied. Hand off report given to primary nurse.     Plan:    Per nephrology team.

## 2021-02-25 NOTE — PLAN OF CARE
Patient is a 65 year old male admitted on 2/15/2021 with mixed cardiogenic and distributive shock with intermittent wide-complex tachycardia c/b hypoxemia requiring intubation, BERNARDA, and legionella pneumonia. PMHx relevant for NICM (LVEF 15-20%) s/p HM3 (02/2020) and ICD (03/2020), MSSA driveline infection and bacteremia, VT on amiodarone, nonobstructive CAD, severe MR, atrial fibrillation (AC warfarin), HTN, ABHINAV on home CPAP, CKD IV, DMII, HIT.    BP (!) 107/90 (BP Location: Left arm)   Pulse 105   Temp 98.6  F (37  C) (Oral)   Resp 18   Wt 96.3 kg (212 lb 3.2 oz)   SpO2 96%   BMI 33.24 kg/m      Neuro/Psych: A&Ox4. Calls appropriately. Forgetful, [place] in bilateral ears but does not use hearing aids.  Cardiac/Tele: Sinus tachycardia w/ BBB, rates in the 100s. Denies chest pain, palpitations, lightheaded/dizziness, or other cardiac concern. LVAD numbers WNL, no alarms overnight.  Respiratory: Sats 97% on RA to home CPAP at HS. Denies SOB. Endorses infrequent dry cough. No other respiratory complaints.  GI/: Reports feeling nauseated last evening with bloody nose episode, resolved without intervention. Otherwise denies nausea overnight. No abdominal discomfort. LBM 2/24, +flatus.  Diet/Appetite: 2g Na diet w/ calorie counts, tolerating well.  Endocrine: BG ACHS.  Skin: LVAD dressing changed on days yesterday, C/D/I. Sacral mepilex in place preventatively. No other skin deficits noted.  LDAs: PIV R arm & L arm, SL. DL PICC R arm infusing dobutamine at 3 mcg/kg/min (9 mL/hr), dormant lumen SL. DL CVC right internal jugular for HD access.  Activity: Up ad tiffanie with SBA to assist of 1. Ambulates with GB and walker.  Pain: Denies pain overnight.    Nephrology following -- likely plan for HD run today. Continues on broad spectrum abx for chronic driveline infection and pneumonia. Nutrition consulting. SW working with pt and wife to coordinate discharge planning, potential need for community dialysis placement, will  likely need to relocate to [facility] until [facility] is trained to accept LVAD patients. Anticipate discharge in 2-3 days once euvolemic and optimized on oral medications, awaiting ARU bed availability.     PCU collect. Hemoglobin this AM 6.9, down from 7.8 yesterday. Per Cardiology note, transfuse if hgb <7.0. Team notified of critical result. Per Cards 2 night resident Mary Garza MD will defer transfusion for now and recheck hgb at 0900 this morning and reassess need for transfusion.    Continue to monitor and report changes to primary team.    Rachana Lees RN  6:14 AM

## 2021-02-25 NOTE — PROVIDER NOTIFICATION
DATE:  2/25/2021   TIME OF RECEIPT FROM LAB:  0536  LAB TEST:  Hemoglobin  LAB VALUE:  6.9 (down from 7.8)  RESULTS GIVEN WITH READ-BACK TO (PROVIDER):  Mi Helms 2 night resident  TIME LAB VALUE REPORTED TO PROVIDER:   0541    Rachana Lees, KRISTEN  5:43 AM

## 2021-02-25 NOTE — PROGRESS NOTES
[facility]    Cardiology Progress Note- Cards 2        Date of Admission:  2/15/2021     Today's Plan  - Continue low dose dobutamine  - Continue hydralazine 50mg tid  - Continue broad spectrum antibiotics    -Cefazolin (2/18/21 - 3/1/21) followed by PO cefalexin    Azithromycin (2/15/21 - 2/25/21)    Cefepime (2/16/21 - 2/18/21) (deescalated to cefazolin)    Vancomycin (2/15/21 - 2/18/21) (MRSA nares negative)    Piperacillin tazobactam (2/15/21-2/16/21) (switched for renal protection)  - IHD today as per nephrology  - ongoing PT/OT  - continue warfarin  - Social work with plans for dialysis and placement  - EP consult    Assessment & Plan: S      Eliseo Tanner is a 65 year old male with PMHx relevant for NICM with LVEF 15-20%, NYHA III s/p HM III 2/18/2020 (post op complicated by retrosternal hematoma with bleeding in the lungs), s/p ICD placed on 3/16/2020, h/o MSSA driveline infection and bacteremia 11/5/2020 on prophylactic cephalexin, h/o VT on amiodarone, moderate nonobstructive CAD, severe MR, atrial fibrillation on warfarin, hypertension, ABHINAV requiring CPAP, CKD IV, DMII, HLP,  h/o HIT who presented to the Cardiovascular Unit (4E Cardiac ICU) with mixed cardiogenic and distributive shock withan intermittent wide complex tachycardia. Initially requiring vasopressors and inotropes, intubated for hypoxemia. Treated for legionella pneumonia. Stabilized on low dose dobutamine off of pressors. Extubated. With persistent oliguric renal failure.     Cardiovascular:    #Mixed Cardiogenic and Septic Shock  #Multiorgan Failure   Presented from [facility] with subacute development of shortness of breath, dyspnea on exertion, dizziness/lightheadedness with near syncopal event found to be febrile with intermittent wide complex tachycardia. Recent COVID19 moderna vaccination. CT chest showing bilateral infiltrates. Community acquired  pneumonia vs. Possible recurrent of MSSA bacteremia WBC 24.5, Procal 7.95, , Lactacte 6.9.    Mildly elevated filling pressures on presentation CVP 18, PA 35/20, PCWP 11. Likely some component of cardiogenic shock. LVAD parameters relatively stable despite markedly elevated LDH 8,531. Euvolemic on exam. Worsening renal function, Cr 4.62, up-trending LFT's/ INR. Legionella antigen positive. Weaned off of vasopressor and maintained on dobutamine. Acute hepatic injury which is improving. Requiring dialysis. Shock resolved.     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 Bhatti (ID clinic)   # Troponin elevation (likely demand ischemia)  # Essential Hypertension  # Hyperlipidemia        Patient with long standing history of NICM previously implanted LVAD (HM III) on 02/18/20 due to worsening functional status and systolic ventricular dysfunction (further complicated by RV dysfunction by VAD hemodynamic compensatory mechanism, VT in ICU now on Amiodarone and Atrial Fibrillation with AVR requiring DCCV on 02/28/20).      Elevated cardiac biomarkers on presentation, SGC with only mildly elevated filling pressures. Euvolemic on exam. Troponin elevation likely related to demand ischemia with no concerns for ACS. Most recent VAD interrogation without any significant Flow-Alarms    LDH 8,531. Initial concern for LVAD thrombus. However given relatively stable LVAD parameters, degree of LDH elevation is out of proportion. Will continue to monitor for LVAD alarms.      - continue  dobutamine  - Held ACE-I/ARB/ARNi: Lisinopril; due to worsening BERNARDA  - No BB; deferred 2/2 shock  - Aldosterone antagonist: deferred while other medical therapy is optimized  - SCD prophylaxis: ICD  - Fluid status : euvolemic, maintain euvolemic with CRRT  - MAP Goal: 65-85  - On Warfarin for HM-III INR Goal 2-3  - Continue Hydralazine 25mg   - Continue ASA 81 mg per oral daily       # Wide complex tachycardia. Atrial Flutter vs Afib vs Ventricular Tachycardia  # History of Atrial Fibrillation s/p DCCV/history of Atrial Flutter       Wide complex tachycardia HR 140s on presentation in the setting of febrile illness and likely pneumonia. Did not initially respond to adenosine. Concern for VT. Intermittently back into HR 60s with underlying rhythm of atrial flutter 3:1 conduction block. Per EP can not rule out VT, may be dual tachycardias.       - hold Amiodarone gtt  - Ensure K+ >4.0 mEq/L and Mg+2 >2.0  - continue warfarin  - On cardiac telemetry    Pulmonary  #Hypoxemic Respiratory Failure  #Legionella Pneumonia  Hypoxemic in the setting of mixed cardiogenic septic shock requiring emergent intubation. O2 requirements quickly improved. S/p extubation on room air    Infectious disease    #Sepsis  #Legionella Pneumonia  #Bilateral pulmonary infiltrates  CT showing bilateral diffuse patchy consolidations concerning for infectious process. Low suspicion for recurrent driveline or possible hardware infection (history of MSSA Chronic Driveline Infection). Urine without pyuria. Urine legionella ag positive.   - f/u blood cultures  - continue Abx as per ID   -Cefazolin (2/18/21 - 3/1/21) followed by PO cefalexin   -Azithromycin (2/15/21 - 2/25/21)    Renal:  # Acute on Chronic CKD stage IV likely 2/2 Septic Shock   Creatinine continues uptrending 3.23 > 4.62 > 4.9. s/p L TDC  - continue CRRT  - Daily Weight's  - Strict I/O's  - Daily BMP's  - Avoid any additional nephrotoxicity      GI  # Shock Liver   # Congestive  Hepatopathy - resolved  Hepatoccelular pattern of liver injury  > 3,496, AST 1,441 > 8,490 likely 2/2 to septic shock. INR downtrended s/p 3mg IV vit K. RUQ US without portal vein thrombus. LFTs downtrended nicely. No evidence of synthetic dysfunction.      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 Ibarra)     # Previous Concern For HIT  On previous hospitalization for LVAD placement (02/06/20-03/20/20), concern for HIT. 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    At that time, patient underwent two HIT panel tests (first one was negative and second antibody test was positive). No known thrombosis events. DAVINA antibody was negative raising question about validity of diagnosis . Per hematology at the time (Dr. James), no other cause for isolated thrombocytopenia. Immediate recovery of plts following d/c of heparin. 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     - avoiding heparin products  - continue warfarin     Endocrine  # Type II DM     > Last Hgb A1C: 6.8 (01/06/21)     - Continue PTA Insulin Basaglar 10 Units Aq at bedtime  - On Medium sliding scale insulin  - Oral Hypoglycemia Protocol      Neurologic:  # Disorientation/Toxic Metabolic Encephalopathy - Resolved  # Chronic Intracranial Small Vessel Disease        Patient reported some lightheadedness/dizziness and disorientation the days prior to hospitalization while at home. However, no reports of LOC, seizure activity, focal deficits, or findings for acute intracranial pathology on most recent OSH CT head w/o contrast (02/15/21). Mental status improved, s/p  extubation      DVT Prophylaxis: therapeutic warfarin  Guevara Catheter: not present  Code Status: Full Code  Fluids: no IVFs  Lines: RIJ TDC ()      Disposition Plan   Expected discharge: 2 - 3 days, recommended to transitional care unit once fluid volume status optimized on oral medication and safe disposition plan/ TCU bed available.    Entered: Daniel Fleming MD 2021, 4:06 PM       The patient's care was discussed with the Attending Physician, Dr. Judd.    Daniel Fleming MD  [facility]  Please see sign in/sign out for up to date coverage information      I have reviewed today's vital signs, notes, medications, labs and imaging. I have also seen and examined the patient and agree with the findings and plan as outlined above.    Veronica Judd MD  Section Head - Advanced Heart Failure, Transplantation and Mechanical Circulatory Support  Director - Adult Congenital [facility]ovascular [facility]  Associate Professor of Medicine, [facility]        Interval History   Yesterday evening with nose bleed, resolved with gauze per nursing. Continued on low dose dobutamine. Urine output improving. Denies SOB, fevers, chills.     Date CVP PA PCWP CO CI SVR SvO2   21 19 40/24 14 5.2 2.4 910 53%   21 8 34/16 12 4.6 2.15 991 48%   21 8 26/18 18 4.8 2.2     21 10 32/16 16 4.1 1.9 1090 38%   21 12           Heartmate 3 (centrifugal flow) VS  Flow (Lpm): 4.3 Lpm  Pulse Index (PI): 3.9 PI  Speed (rpm): 5500 rpm  Power (muhammad): 4.2 muhammad  Current Hct settin    Data reviewed today: I reviewed all medications, new labs and imaging results over the last 24 hours.      Physical Exam   Vital Signs: Temp: 97.9  F (36.6  C) Temp src: Oral BP: 95/66 Pulse: 89   Resp: 20 SpO2: 97 % O2 Device: None (Room air)    Weight: 212 lbs 3.2 oz    In general, the patient is awake alert and oriented, sitting in bed in no apparent distress.       Neck: RIJ TDC in place. No JVP  Heart: RRR. S1 and S2 no appreciated. Mechanical whir. No rub, click, or gallop.   Lungs: Mild bilateral crackles. No wheezes  GI: Soft, nontender, nondistended.   Extremities: trace edema. The pulses are 2+at the radial and DP bilaterally.  Neuro: grossly non focal  Skin: no rashes.    Data   Recent Labs   Lab 02/25/21  0915 02/25/21  0519 02/24/21  1855 02/24/21  0442 02/23/21  0311 02/23/21 0311   WBC  --  9.5  --  10.8  --  13.5*   HGB 7.3* 6.9*  --  7.8*  --  8.2*   MCV  --  75*  --  75*  --  73*   PLT  --  239  --  244  --  249   INR  --  2.52*  --  2.56*  --  2.60*   NA  --  132* 131* 132*   < > 133   POTASSIUM  --  4.6 4.7 4.1   < > 4.7   CHLORIDE  --  98 98 99   < > 102   CO2  --  24 23 25   < > 23   BUN  --  71* 59* 44*   < > 49*   CR  --  5.74* 5.03* 3.84*   < > 3.67*   ANIONGAP  --  10 9 8   < > 8   CALOS  --  7.6* 7.9* 8.0*   < > 8.4*   GLC  --  136* 161* 125*   < > 127*   ALBUMIN  --  2.4* 2.5* 2.2*   < > 2.2*   PROTTOTAL  --  6.4* 6.7* 6.4*   < > 6.4*   BILITOTAL  --  0.5 0.6 0.7   < > 0.7   ALKPHOS  --  146 168* 160*   < > 178*   ALT  --  65 94* 66   < > 102*   AST  --  112* 132* 146*   < > 215*    < > = values in this interval not displayed.     TTE 2/15/2021  Interpretation Summary  HM 3 at 5500 RPM  LV size is small, LVIDd = 3.0 cm. Severely reduced left ventricular function.  Doppler of the inflow cannula and outflow graft are normal.  RV is severely dilated. Severely reduced right ventricular function.  Mild TR is present.  The aortic valve is closed with mild regurgitation.  IVC is dilated and patient is on a ventilator.  No pericardial effusion present.

## 2021-02-25 NOTE — CONSULTS
Electrophysiology Consultation Note   EP Attending: .   Reason for consultation: AT/AFL.   Provider requesting consultation: , Cardiology II Service.  Date of Service: 2/25/2021       HPI:   Mr. Tanner is a 65 year old male who has a past medical history significant for NICM LVEF 15-20% s/p LVAD HM3 2/18/20, s/p secondary prevention ICD 3/2020, VT/VF, moderate nonobstructive CAD, severe MR, HTN, ABHINAV (uses CPAP), DM2, CKD III, HLD, ANA, and prior tobacco use.   He started having worsening SOB/MIRANDA in 9/2019 which progressively worsened. In 10/2019 he was admitted to OSH and was found to have newly reduced LVEF down to 25-30% with severe MR. He was diuresed about 8 lbs until his SCr bumped. He was started on HF medications. He underwent a CHAMP which showed severe MR and then a coronary angiogram which showed mild/moderate CAD with severe branch disease and elevated LVEDP. He was admitted and underwent diuresis again. He was then admitted in 1/2020 with HF exacerbation found to be in cardiogenic shock. He was started on Milrinone and underwent diuresis. He was ultimately, very slowly weaned off the milrinone with stable symptoms and labs. SCr at discharge was 1.4 and then on 1/29/20 it had rapid worsening of creat to 2.6. Therefore the decision was made to admit to restart milrinone infusion. Milrinone was restarted and titrated to 0.5 mcg/kg/min. His renal function improved. His Entresto dose was decreased due to lower BP's. PICC line was placed and he was discharged with home milrinone. He then presented for follow up with Cardiology and was feeling poorly, weight gain, feeling SOB, worsening edema, and felt heart racing. He also reported poor appetite as well. He was then sent here for admission. Prior to arrival to floor he was noted to be in Vfib and required shock x 3. He was loaded with amiodarone, ASA 324mg. He ultimately required LVAD placement. His postoperative course was complicated by  "retrosternal hematoma and bleeding in the lungs, RV failure, ANA, VT/VF, and Afib w/AVR S/p DCCV on 2/28.  He then had ICD implanted on 3/16/20 as secondary prevention of SCD. He has been having increasing SCr over the last few months.  It has seemed that SCr has been worsening despite increasing and decreasing his diuretics. He followed up in EP Clinic in 10/2020 and device site was well healed. He was arranged for updated amiodarone monitoring which was stable/WDL.   He then presented to OSH with subacute dizziness/lightheadedness and syncope while straining for a bowel movement. He reported feeling weak and foggy. Of note, patient recently received His first dose of the COVID-19 vaccine (Moderna) on Thursday 02/11/21. He reported feeling overall \"poorly\" the subsequent days. Symptoms were described as general malaise, poor appetite (with decreased oral intake of arellano liquid/solid nutrients), more short of breath/labored breathing, lightheadedness/\"Fuzzy\" sensation and some disorientation. He did not take his medications this morning because he was feeling unwell. While at OSH, he was given IV fluid bolus, IV abx. He was found to have HR 110s and fever 102.3F. Imaging showed patch right sided infiltrates and labs showed leukocytosis with neutrophilia and lactic acidosis, elevated SCr, and Transaminitis. BNP 03729. He was subsequently transferred here for advanced cares. Upon arrival here, he was in  bpm. He was given adenosine without effect. He was started on IV amiodarone bolus and gtt. He had increased work of breathing and required intubation. Echo this admission showed small LV size, severely reduced LVEF, severely dilated RV with severely reduced RV function, mild TR, and IVC dilated (on vent). He had mixed cardiogenic and distributive shock withan intermittent wide complex tachycardia. Initially requiring vasopressors and inotropes, intubated for hypoxemia. Treated for legionella pneumonia. " Stabilized on low dose dobutamine off of pressors. He has been extubated. Continues to have persistent oliguric renal failure requiring iHD. Current cardiac medication: ASA, hydralazine, Dobutamine gtt, and Warfarin.     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)      LVAD (left ventricular assist device) present (H)      Past Surgical History:   Past Surgical History:   Procedure Laterality Date     ANESTHESIA CARDIOVERSION N/A 2/28/2020    Procedure: ANESTHESIA, FOR CARDIOVERSION;  Surgeon: GENERIC ANESTHESIA PROVIDER;  Location: [facility] OR     CV CENTRAL VENOUS CATHETER PLACEMENT N/A 2/13/2020    Procedure: Central Venous Catheter Placement;  Surgeon: Chente Moss MD;  Location:  [facility] CATH LAB     CV INTRA-AORTIC BALLOON PUMP INSERTION N/A 2/7/2020    Procedure: Intra-Aortic Balloon Pump Insertion;  Surgeon: Jose Baldwin MD;  Location:  [facility]ATH [facility]     CV INTRA-AORTIC BALLOON PUMP INSERTION N/A 2/13/2020    Procedure: Intra-Aortic Balloon Pump Insertion;  Surgeon: Chente Moss MD;  Location:  [facility] CATH [facility]     CV RIGHT HEART CATH MEASUREMENTS RECORDED N/A 9/21/2020    Procedure: CV RIGHT HEART CATH;  Surgeon: Dalton Baeza MD;  Location:  HE[facility] CARDIAC CATH LAB     CV RIGHT HEART CATH MEASUREMENTS RECORDED N/A 1/7/2021    Procedure: Right Heart Cath;  Surgeon: Chun Ball MD;  Location:  HEART CARDIAC CATH LAB     CV SWAN LUCIANA PROCEDURE N/A 2/13/2020    Procedure: Orlando Luciana Procedure;  Surgeon: Chente Moss MD;  Location:  HE[facility] CARD[facility] CATH LAB     EP ICD Bilateral 3/16/2020    Procedure: EP ICD;  Surgeon: Dali Day MD;  Location:  HE[facility] CARD[facility] CATH LAB     INSERT VENTRICULAR  ASSIST DEVICE LEFT (HEARTMATE II) N/A 2/18/2020    Procedure: INSERTION, LEFT VENTRICULAR ASSIST DEVICE (HEARTMATE III);  Surgeon: Mac Jaramillo MD;  Location: [facility] OR     IR CVC TUNNEL PLACEMENT > 5 YRS OF AGE  2/23/2021     THORACOSCOPY Right 3/6/2020    Procedure: RIGHT VIDEO-ASSISTED THORASCOPIC SURGERY, EVACUATION OF HEMOTHORAX, PLACEMENT OF CHEST TUBES;  Surgeon: William Gan MD;  Location: [facility] OR     Allergies: Per MAR     Allergies   Allergen Reactions     Heparin      HIT screen positive 2/14/20, reflex DAVINA negative; however heme recommended treating as if positive  HIT screen negative 2/11/20     Oxycodone Itching and Other (See Comments)     Chlorhexidine Rash     Medications:   Per MAR current outpatient cardiovascular medications include:   Medications Prior to Admission   Medication Sig Dispense Refill Last Dose     acetaminophen (TYLENOL) 325 MG tablet Take 2 tablets (650 mg) by mouth every 4 hours as needed for mild pain or fever 100 tablet 0      allopurinol (ZYLOPRIM) 100 MG tablet 2 tablets (200 mg) by Oral or Feeding Tube route daily 60 tablet 1      amiodarone (PACERONE) 200 MG tablet Take 1 tablet (200 mg) by mouth daily 90 tablet 3      aspirin (ASA) 81 MG EC tablet Take 1 tablet (81 mg) by mouth daily 90 tablet 3      atorvastatin (LIPITOR) 20 MG tablet Take 20 mg by mouth daily        bumetanide (BUMEX) 1 MG tablet Take 1 tablet (1 mg) by mouth daily 90 tablet 3      cephALEXin (KEFLEX) 500 MG capsule Take 1 capsule (500 mg) by mouth 4 times daily 120 capsule 1      co-enzyme Q-10 200 MG CAPS 200 mg by Oral or Feeding Tube route daily        finasteride (PROSCAR) 5 MG tablet Take 1 tablet (5 mg) by mouth daily Helps with urinary retention. 30 tablet 0      FLUoxetine (PROZAC) 10 MG capsule Take 30 mg by mouth daily        hydrALAZINE (APRESOLINE) 50 MG tablet Take 2 tablets (100 mg) by mouth 3 times daily 560 tablet 3      insulin glargine (BASAGLAR KWIKPEN) 100 UNIT/ML pen Inject  10 Units Subcutaneous At Bedtime         insulin pen needle (BD JAIME U/F) 32G X 4 MM miscellaneous         lisinopril (ZESTRIL) 10 MG tablet Take 1 tablet (10 mg) by mouth daily 90 tablet 3      magnesium oxide (MAG-OX) 400 MG tablet 1 tablet (400 mg) by Oral or Feeding Tube route daily 30 tablet 1      multivitamin w/minerals (THERA-VIT-M) tablet Take 1 tablet by mouth daily 30 tablet 1      nitroGLYcerin (NITROSTAT) 0.4 MG sublingual tablet For chest pain place 1 tablet under the tongue every 5 minutes for 3 doses. If symptoms persist 5 minutes after 1st dose call 911. 30 tablet 0      omeprazole (PRILOSEC) 20 MG DR capsule Take 20 mg by mouth daily        senna-docusate (SENOKOT-S/PERICOLACE) 8.6-50 MG tablet Take 1 tablet by mouth 2 times daily as needed for constipation        tamsulosin (FLOMAX) 0.4 MG capsule Take 1 capsule (0.4 mg) by mouth daily This med helps with urinary retention 30 capsule 0      warfarin ANTICOAGULANT (COUMADIN) 2 MG tablet Take 4 mg by mouth daily         zolpidem (AMBIEN) 5 MG tablet Take 5 mg by mouth nightly as needed for Insomnia        No current outpatient medications on file.     Current Facility-Administered Medications   Medication Dose Route Frequency     allopurinol  100 mg Oral or Feeding Tube Daily     aspirin  81 mg Oral Daily     ceFAZolin  2 g Intravenous Daily     finasteride  5 mg Oral Daily     FLUoxetine  30 mg Oral Daily     hydrALAZINE  50 mg Oral or Feeding Tube Q8H BRITTANI     insulin aspart  1-7 Units Subcutaneous TID AC     insulin aspart  1-5 Units Subcutaneous At Bedtime     melatonin  3 mg Oral At Bedtime     multivitamin RENAL  1 capsule Oral Daily     omeprazole  20 mg Oral QAM AC     oxymetazoline  2 spray Both Nostrils BID     senna-docusate  2 tablet Oral BID     tamsulosin  0.4 mg Oral Daily     warfarin ANTICOAGULANT  2 mg Oral ONCE at 18:00     Family History:   No family history on file.  Social History:   Social History     Tobacco Use     Smoking  status: Former Smoker     Quit date: 1994     Years since quittin.9     Smokeless tobacco: Never Used   Substance Use Topics     Alcohol use: Not on file       ROS:   10 point ROS neg other than the symptoms noted above in the HPI.    Physical Examination:   VITALS: BP 95/85   Pulse 90   Temp 98.1  F (36.7  C) (Oral)   Resp 25   Wt 96.3 kg (212 lb 3.2 oz)   SpO2 99%   BMI 33.24 kg/m    GENERAL APPEARANCE: A&O.   HEENT:MMM. PERRLA.   NECK: Supple.    CHEST: CTAB   CARDIOVASCULAR: VAD hum.    ABDOMEN: BS+, soft, drive line site CDI.   EXTREMITIES: No pedal edema. Distal pulses intact.   NEURO: alert, oriented  PSYCH: normal affect.   SKIN: Warm and dry.     Data:   Labs:  BMP  Recent Labs   Lab 21   * 131* 132* 134   POTASSIUM 4.6 4.7 4.1 4.1   CHLORIDE 98 98 99 101   CALOS 7.6* 7.9* 8.0* 8.3*   CO2    BUN 71* 59* 44* 36*   CR 5.74* 5.03* 3.84* 2.95*   * 161* 125* 147*     CBC  Recent Labs   Lab 21  0353   WBC  --  9.5 10.8 13.5* 13.3*   RBC  --  2.99* 3.34* 3.54* 3.92*   HGB 7.3* 6.9* 7.8* 8.2* 9.3*   HCT  --  22.5* 25.0* 26.0* 29.1*   MCV  --  75* 75* 73* 74*   MCH  --  23.1* 23.4* 23.2* 23.7*   MCHC  --  30.7* 31.2* 31.5 32.0   RDW  --  22.8* 22.5* 22.4* 21.7*   PLT  --  239 244 249 233     INR  Recent Labs   Lab 21  0353   INR 2.52* 2.56* 2.60* 2.26*     Cholesterol (mg/dL)   Date Value   2020 118     HDL Cholesterol (mg/dL)   Date Value   2020 71     LDL Cholesterol Calculated (mg/dL)   Date Value   2020 35     EKG:         ECHO:   Interpretation Summary  HM 3 at 5500 RPM  LV size is small, LVIDd = 3.0 cm. Severely reduced left ventricular function.  Doppler of the inflow cannula and outflow graft are normal.  RV is severely dilated. Severely reduced right ventricular function.  Mild TR  is present.  The aortic valve is closed with mild regurgitation.  IVC is dilated and patient is on a ventilator.  No pericardial effusion present.  Assessment:   Mr. Tanner is a 65 year old male who has a past medical history significant for NICM LVEF 15-20% s/p LVAD HM3 2/18/20, s/p secondary prevention ICD 3/2020, VT/VF, moderate nonobstructive CAD, severe MR, HTN, ABHINAV (uses CPAP), DM2, CKD III, HLD, ANA, and prior tobacco use.   He started having worsening SOB/MIRANDA in 9/2019 which progressively worsened. In 10/2019 he was admitted to OSH and was found to have newly reduced LVEF down to 25-30% with severe MR. He was diuresed about 8 lbs until his SCr bumped. He was started on HF medications. He underwent a CHAMP which showed severe MR and then a coronary angiogram which showed mild/moderate CAD with severe branch disease and elevated LVEDP. He was admitted and underwent diuresis again. He was then admitted in 1/2020 with HF exacerbation found to be in cardiogenic shock. He was started on Milrinone and underwent diuresis. He was ultimately, very slowly weaned off the milrinone with stable symptoms and labs. SCr at discharge was 1.4 and then on 1/29/20 it had rapid worsening of creat to 2.6. Therefore the decision was made to admit to restart milrinone infusion. Milrinone was restarted and titrated to 0.5 mcg/kg/min. His renal function improved. His Entresto dose was decreased due to lower BP's. PICC line was placed and he was discharged with home milrinone. He then presented for follow up with Cardiology and was feeling poorly, weight gain, feeling SOB, worsening edema, and felt heart racing. He also reported poor appetite as well. He was then sent here for admission. Prior to arrival to floor he was noted to be in Vfib and required shock x 3. He was loaded with amiodarone, ASA 324mg. He ultimately required LVAD placement. His postoperative course was complicated by retrosternal hematoma and bleeding in the lungs, RV  "failure, ANA, VT/VF, and Afib w/AVR S/p DCCV on 2/28.  He then had ICD implanted on 3/16/20 as secondary prevention of SCD. He has been having increasing SCr over the last few months.  It has seemed that SCr has been worsening despite increasing and decreasing his diuretics. He followed up in EP Clinic in 10/2020 and device site was well healed. He was arranged for updated amiodarone monitoring which was stable/WDL.   He then presented to OSH with subacute dizziness/lightheadedness and syncope while straining for a bowel movement. He reported feeling weak and foggy. Of note, patient recently received His first dose of the COVID-19 vaccine (Moderna) on Thursday 02/11/21. He reported feeling overall \"poorly\" the subsequent days. Symptoms were described as general malaise, poor appetite (with decreased oral intake of arellano liquid/solid nutrients), more short of breath/labored breathing, lightheadedness/\"Fuzzy\" sensation and some disorientation. He did not take his medications this morning because he was feeling unwell. While at OSH, he was given IV fluid bolus, IV abx. He was found to have HR 110s and fever 102.3F. Imaging showed patch right sided infiltrates and labs showed leukocytosis with neutrophilia and lactic acidosis, elevated SCr, and Transaminitis. BNP 60991. He was subsequently transferred here for advanced cares. Upon arrival here, he was in  bpm. He was given adenosine without effect. He was started on IV amiodarone bolus and gtt. He had increased work of breathing and required intubation. Echo this admission showed small LV size, severely reduced LVEF, severely dilated RV with severely reduced RV function, mild TR, and IVC dilated (on vent). He had mixed cardiogenic and distributive shock withan intermittent wide complex tachycardia. Initially requiring vasopressors and inotropes, intubated for hypoxemia. Treated for legionella pneumonia. Stabilized on low dose dobutamine off of pressors. He has " been extubated. Continues to have persistent oliguric renal failure requiring iHD. Current cardiac medication: ASA, hydralazine, Dobutamine gtt, and Warfarin.      EP Recommendations:  Atrial Fibrillation/Flutter:  1. Stroke Prophylaxis:  CHADSVASC=+age, +HF, +HTN, +DM  4, corresponding to a 4.0% annual stroke / systemic emolism event rate. indicating need for long term oral anticoagulation.  He also requires anticoagulation for LVAD.   2. Rate Control: not on BB given shock, allow for liberalized HR control in acute illness setting.   3. Rhythm Control: He was on amiodarone gtt and now is off amiodarone. Rhythm is not impacting his hemodynamics. We would pursue when tuned up from HF standpoint, aiming for Monday 3/1/21. If he continues to have recurrences, then we would need to consider AAT or ablation if he is more recovered.       Thank you for allowing us to participate in the care of this patient.     The patient was discussed w/ Dr. Cruz.  The above note reflects our joint plan.    JUAN Reich CNP  Electrophysiology Consult Service  Pager: [phone]    EP STAFF NOTE  I have seen and examined the patient as part of a shared visit with LUIZ Reich NP.  I agree with the note above. I reviewed today's vital signs and medications. I have reviewed and discussed with the advanced practice provider their physical examination, assessment, and plan   Briefly, Mr. Tanner is a 65 year old male who has a past medical history significant for NICM LVEF 15-20% s/p LVAD HM3 2/18/20, s/p secondary prevention ICD 3/2020, VT/VF, moderate nonobstructive CAD, severe MR, HTN, ABHINAV (uses CPAP), DM2, CKD III, HLD, ANA, and prior tobacco use, recent LVAD, now with AF/AFL  My key history/exam findings are: AFL.   The key management decisions made by me: if persistent, DCCV after tune up.    Adi Cruz MD [facility]  Cardiology - Electrophysiology

## 2021-02-25 NOTE — PROVIDER NOTIFICATION
Pt had nosebleed, lasting 1 hour with clots. Unable to stop with ice and pressure. MD at bedside to assess. Obtained order for afrin spray. No further bleeding at this time.

## 2021-02-26 ENCOUNTER — APPOINTMENT (OUTPATIENT)
Dept: OCCUPATIONAL THERAPY | Facility: CLINIC | Age: 66
DRG: 870 | End: 2021-02-26
Attending: INTERNAL MEDICINE
Payer: MEDICARE

## 2021-02-26 LAB
ABO + RH BLD: NORMAL
ABO + RH BLD: NORMAL
ALBUMIN SERPL-MCNC: 2.4 G/DL (ref 3.4–5)
ALBUMIN SERPL-MCNC: 2.7 G/DL (ref 3.4–5)
ALP SERPL-CCNC: 147 U/L (ref 40–150)
ALP SERPL-CCNC: 160 U/L (ref 40–150)
ALT SERPL W P-5'-P-CCNC: 52 U/L (ref 0–70)
ALT SERPL W P-5'-P-CCNC: 60 U/L (ref 0–70)
ANION GAP SERPL CALCULATED.3IONS-SCNC: 10 MMOL/L (ref 3–14)
ANION GAP SERPL CALCULATED.3IONS-SCNC: 12 MMOL/L (ref 3–14)
ANISOCYTOSIS BLD QL SMEAR: SLIGHT
APTT PPP: 44 SEC (ref 22–37)
AST SERPL W P-5'-P-CCNC: 91 U/L (ref 0–45)
AST SERPL W P-5'-P-CCNC: 91 U/L (ref 0–45)
BACTERIA SPEC CULT: ABNORMAL
BASOPHILS # BLD AUTO: 0.1 10E9/L (ref 0–0.2)
BASOPHILS NFR BLD AUTO: 0.9 %
BILIRUB SERPL-MCNC: 0.6 MG/DL (ref 0.2–1.3)
BILIRUB SERPL-MCNC: 0.6 MG/DL (ref 0.2–1.3)
BLD GP AB SCN SERPL QL: NORMAL
BLD PROD TYP BPU: NORMAL
BLD PROD TYP BPU: NORMAL
BLD UNIT ID BPU: 0
BLOOD BANK CMNT PATIENT-IMP: NORMAL
BLOOD PRODUCT CODE: NORMAL
BPU ID: NORMAL
BUN SERPL-MCNC: 58 MG/DL (ref 7–30)
BUN SERPL-MCNC: 65 MG/DL (ref 7–30)
BURR CELLS BLD QL SMEAR: SLIGHT
CALCIUM SERPL-MCNC: 7.7 MG/DL (ref 8.5–10.1)
CALCIUM SERPL-MCNC: 7.9 MG/DL (ref 8.5–10.1)
CHLORIDE SERPL-SCNC: 94 MMOL/L (ref 94–109)
CHLORIDE SERPL-SCNC: 94 MMOL/L (ref 94–109)
CO2 SERPL-SCNC: 20 MMOL/L (ref 20–32)
CO2 SERPL-SCNC: 24 MMOL/L (ref 20–32)
CREAT SERPL-MCNC: 4.81 MG/DL (ref 0.66–1.25)
CREAT SERPL-MCNC: 5.59 MG/DL (ref 0.66–1.25)
DIFFERENTIAL METHOD BLD: ABNORMAL
EOSINOPHIL # BLD AUTO: 0.2 10E9/L (ref 0–0.7)
EOSINOPHIL NFR BLD AUTO: 1.8 %
ERYTHROCYTE [DISTWIDTH] IN BLOOD BY AUTOMATED COUNT: 22.9 % (ref 10–15)
GFR SERPL CREATININE-BSD FRML MDRD: 10 ML/MIN/{1.73_M2}
GFR SERPL CREATININE-BSD FRML MDRD: 12 ML/MIN/{1.73_M2}
GLUCOSE BLDC GLUCOMTR-MCNC: 106 MG/DL (ref 70–99)
GLUCOSE BLDC GLUCOMTR-MCNC: 108 MG/DL (ref 70–99)
GLUCOSE BLDC GLUCOMTR-MCNC: 149 MG/DL (ref 70–99)
GLUCOSE BLDC GLUCOMTR-MCNC: 166 MG/DL (ref 70–99)
GLUCOSE BLDC GLUCOMTR-MCNC: 168 MG/DL (ref 70–99)
GLUCOSE SERPL-MCNC: 105 MG/DL (ref 70–99)
GLUCOSE SERPL-MCNC: 177 MG/DL (ref 70–99)
HCT VFR BLD AUTO: 22.2 % (ref 40–53)
HGB BLD-MCNC: 7 G/DL (ref 13.3–17.7)
HGB BLD-MCNC: 8.4 G/DL (ref 13.3–17.7)
INR PPP: 2.3 (ref 0.86–1.14)
INTERPRETATION ECG - MUSE: NORMAL
LDH SERPL L TO P-CCNC: 306 U/L (ref 85–227)
LYMPHOCYTES # BLD AUTO: 0.5 10E9/L (ref 0.8–5.3)
LYMPHOCYTES NFR BLD AUTO: 5.3 %
MCH RBC QN AUTO: 24.1 PG (ref 26.5–33)
MCHC RBC AUTO-ENTMCNC: 31.5 G/DL (ref 31.5–36.5)
MCV RBC AUTO: 76 FL (ref 78–100)
MICROCYTES BLD QL SMEAR: PRESENT
MONOCYTES # BLD AUTO: 0.7 10E9/L (ref 0–1.3)
MONOCYTES NFR BLD AUTO: 7.9 %
MYELOCYTES # BLD: 0.1 10E9/L
MYELOCYTES NFR BLD MANUAL: 0.9 %
NEUTROPHILS # BLD AUTO: 7.6 10E9/L (ref 1.6–8.3)
NEUTROPHILS NFR BLD AUTO: 83.2 %
NUM BPU REQUESTED: 1
PLATELET # BLD AUTO: 228 10E9/L (ref 150–450)
PLATELET # BLD EST: ABNORMAL 10*3/UL
POIKILOCYTOSIS BLD QL SMEAR: SLIGHT
POTASSIUM SERPL-SCNC: 4 MMOL/L (ref 3.4–5.3)
POTASSIUM SERPL-SCNC: 4.4 MMOL/L (ref 3.4–5.3)
PROT SERPL-MCNC: 6.4 G/DL (ref 6.8–8.8)
PROT SERPL-MCNC: 7.2 G/DL (ref 6.8–8.8)
RBC # BLD AUTO: 2.91 10E12/L (ref 4.4–5.9)
SODIUM SERPL-SCNC: 126 MMOL/L (ref 133–144)
SODIUM SERPL-SCNC: 128 MMOL/L (ref 133–144)
SPECIMEN EXP DATE BLD: NORMAL
SPECIMEN SOURCE: ABNORMAL
TARGETS BLD QL SMEAR: ABNORMAL
TRANSFUSION STATUS PATIENT QL: NORMAL
TRANSFUSION STATUS PATIENT QL: NORMAL
WBC # BLD AUTO: 9.1 10E9/L (ref 4–11)

## 2021-02-26 PROCEDURE — 80053 COMPREHEN METABOLIC PANEL: CPT | Performed by: STUDENT IN AN ORGANIZED HEALTH CARE EDUCATION/TRAINING PROGRAM

## 2021-02-26 PROCEDURE — 86923 COMPATIBILITY TEST ELECTRIC: CPT | Performed by: INTERNAL MEDICINE

## 2021-02-26 PROCEDURE — 86900 BLOOD TYPING SEROLOGIC ABO: CPT | Performed by: INTERNAL MEDICINE

## 2021-02-26 PROCEDURE — 250N000013 HC RX MED GY IP 250 OP 250 PS 637: Performed by: STUDENT IN AN ORGANIZED HEALTH CARE EDUCATION/TRAINING PROGRAM

## 2021-02-26 PROCEDURE — 85610 PROTHROMBIN TIME: CPT | Performed by: STUDENT IN AN ORGANIZED HEALTH CARE EDUCATION/TRAINING PROGRAM

## 2021-02-26 PROCEDURE — 999N000044 HC STATISTIC CVC DRESSING CHANGE

## 2021-02-26 PROCEDURE — 85025 COMPLETE CBC W/AUTO DIFF WBC: CPT | Performed by: STUDENT IN AN ORGANIZED HEALTH CARE EDUCATION/TRAINING PROGRAM

## 2021-02-26 PROCEDURE — 214N000001 HC R&B CCU UMMC

## 2021-02-26 PROCEDURE — 86901 BLOOD TYPING SEROLOGIC RH(D): CPT | Performed by: INTERNAL MEDICINE

## 2021-02-26 PROCEDURE — 250N000011 HC RX IP 250 OP 636

## 2021-02-26 PROCEDURE — 99233 SBSQ HOSP IP/OBS HIGH 50: CPT | Mod: GC | Performed by: INTERNAL MEDICINE

## 2021-02-26 PROCEDURE — P9016 RBC LEUKOCYTES REDUCED: HCPCS | Performed by: INTERNAL MEDICINE

## 2021-02-26 PROCEDURE — 85018 HEMOGLOBIN: CPT | Performed by: INTERNAL MEDICINE

## 2021-02-26 PROCEDURE — 83615 LACTATE (LD) (LDH) ENZYME: CPT | Performed by: STUDENT IN AN ORGANIZED HEALTH CARE EDUCATION/TRAINING PROGRAM

## 2021-02-26 PROCEDURE — 85730 THROMBOPLASTIN TIME PARTIAL: CPT | Performed by: STUDENT IN AN ORGANIZED HEALTH CARE EDUCATION/TRAINING PROGRAM

## 2021-02-26 PROCEDURE — 250N000011 HC RX IP 250 OP 636: Performed by: STUDENT IN AN ORGANIZED HEALTH CARE EDUCATION/TRAINING PROGRAM

## 2021-02-26 PROCEDURE — 999N001017 HC STATISTIC GLUCOSE BY METER IP

## 2021-02-26 PROCEDURE — 250N000013 HC RX MED GY IP 250 OP 250 PS 637: Performed by: INTERNAL MEDICINE

## 2021-02-26 PROCEDURE — 80053 COMPREHEN METABOLIC PANEL: CPT | Performed by: INTERNAL MEDICINE

## 2021-02-26 PROCEDURE — 86850 RBC ANTIBODY SCREEN: CPT | Performed by: INTERNAL MEDICINE

## 2021-02-26 PROCEDURE — 97535 SELF CARE MNGMENT TRAINING: CPT | Mod: GO

## 2021-02-26 RX ORDER — ONDANSETRON 4 MG/1
4 TABLET, FILM COATED ORAL ONCE
Status: COMPLETED | OUTPATIENT
Start: 2021-02-26 | End: 2021-02-26

## 2021-02-26 RX ORDER — BUMETANIDE 0.25 MG/ML
3 INJECTION INTRAMUSCULAR; INTRAVENOUS ONCE
Status: COMPLETED | OUTPATIENT
Start: 2021-02-26 | End: 2021-02-26

## 2021-02-26 RX ORDER — AMIODARONE HYDROCHLORIDE 200 MG/1
200 TABLET ORAL DAILY
Status: DISCONTINUED | OUTPATIENT
Start: 2021-02-26 | End: 2021-03-17 | Stop reason: HOSPADM

## 2021-02-26 RX ADMIN — INSULIN ASPART 1 UNITS: 100 INJECTION, SOLUTION INTRAVENOUS; SUBCUTANEOUS at 12:13

## 2021-02-26 RX ADMIN — Medication 1 CAPSULE: at 09:16

## 2021-02-26 RX ADMIN — ONDANSETRON HYDROCHLORIDE 4 MG: 4 TABLET, FILM COATED ORAL at 20:56

## 2021-02-26 RX ADMIN — ALLOPURINOL 100 MG: 100 TABLET ORAL at 09:17

## 2021-02-26 RX ADMIN — FINASTERIDE 5 MG: 5 TABLET, FILM COATED ORAL at 09:16

## 2021-02-26 RX ADMIN — ASPIRIN 81 MG CHEWABLE TABLET 81 MG: 81 TABLET CHEWABLE at 09:16

## 2021-02-26 RX ADMIN — TAMSULOSIN HYDROCHLORIDE 0.4 MG: 0.4 CAPSULE ORAL at 09:16

## 2021-02-26 RX ADMIN — OMEPRAZOLE 20 MG: 20 CAPSULE, DELAYED RELEASE ORAL at 09:16

## 2021-02-26 RX ADMIN — Medication 1.5 MG: at 17:31

## 2021-02-26 RX ADMIN — BUMETANIDE 3 MG: 0.25 INJECTION INTRAMUSCULAR; INTRAVENOUS at 12:57

## 2021-02-26 RX ADMIN — FLUOXETINE 30 MG: 20 CAPSULE ORAL at 09:16

## 2021-02-26 RX ADMIN — HYDRALAZINE HYDROCHLORIDE 50 MG: 50 TABLET ORAL at 15:02

## 2021-02-26 RX ADMIN — INSULIN ASPART 1 UNITS: 100 INJECTION, SOLUTION INTRAVENOUS; SUBCUTANEOUS at 17:31

## 2021-02-26 RX ADMIN — HYDRALAZINE HYDROCHLORIDE 50 MG: 50 TABLET ORAL at 06:14

## 2021-02-26 RX ADMIN — AMIODARONE HYDROCHLORIDE 200 MG: 200 TABLET ORAL at 09:22

## 2021-02-26 RX ADMIN — HYDRALAZINE HYDROCHLORIDE 50 MG: 50 TABLET ORAL at 22:04

## 2021-02-26 RX ADMIN — OXYMETAZOLINE HYDROCHLORIDE 2 SPRAY: 0.05 SPRAY NASAL at 09:17

## 2021-02-26 RX ADMIN — CEFAZOLIN SODIUM 2 G: 2 INJECTION, SOLUTION INTRAVENOUS at 20:48

## 2021-02-26 RX ADMIN — ZOLPIDEM TARTRATE 5 MG: 5 TABLET, FILM COATED ORAL at 22:04

## 2021-02-26 ASSESSMENT — ACTIVITIES OF DAILY LIVING (ADL)
ADLS_ACUITY_SCORE: 15

## 2021-02-26 NOTE — PROGRESS NOTES
Calorie Count  Intake recorded for: 2/25  Total Kcals: 1566 Total Protein: 77g  Kcals from Hospital Food: 1566   Protein: 77g  Kcals from Outside Food (average):0 Protein: 0g  # Meals Ordered from Kitchen: 3 meals ordered from kitchen  # Meals Recorded: 2 meals, first meal - (100%  Two omelets (2 eggs in each) w/ mushroom, cheese & onion, white toast)          Second meal - (100% cheeseburger w/ onion, two ice creams, lemon ice)   # Supplements Recorded: 1 Boost Plus (100%)

## 2021-02-26 NOTE — PROGRESS NOTES
[facility]    Cardiology Progress Note- Cards 2        Date of Admission:  2/15/2021     Today's Plan  - Continue low dose dobutamine  - Bumetanide prn and dialysis as per nephrology - work towards net negative 1-2L  - Continue hydralazine 50mg tid  - Continue broad spectrum antibiotics    -Cefazolin (2/18/21 - 3/1/21) followed by PO cefalexin    Azithromycin (2/15/21 - 2/25/21)    Cefepime (2/16/21 - 2/18/21) (deescalated to cefazolin)    Vancomycin (2/15/21 - 2/18/21) (MRSA nares negative)    Piperacillin tazobactam (2/15/21-2/16/21) (switched for renal protection)  - ongoing PT/OT  - continue warfarin  - Social work arranging dialysis and placement  - restart home amiodarone    Assessment & Plan: [facility]      Eliseo Tanner is a 65 year old male with PMHx relevant for NICM with LVEF 15-20%, NYHA III s/p HM III 2/18/2020 (post op complicated by retrosternal hematoma with bleeding in the lungs), s/p ICD placed on 3/16/2020, h/o MSSA driveline infection and bacteremia 11/5/2020 on prophylactic cephalexin, h/o VT on amiodarone, moderate nonobstructive CAD, severe MR, atrial fibrillation on warfarin, hypertension, ABHINAV requiring CPAP, CKD IV, DMII, HLP,  h/o HIT who presented to the Cardiovascular Unit (4E Cardiac ICU) with mixed cardiogenic and distributive shock withan intermittent wide complex tachycardia. Initially requiring vasopressors and inotropes, intubated for hypoxemia. Treated for legionella pneumonia. Stabilized on low dose dobutamine off of pressors. Extubated. With persistent oliguric renal failure.     Cardiovascular:    #Mixed Cardiogenic and Septic Shock  #Multiorgan Failure   Presented from [facility] with subacute development of shortness of breath, dyspnea on exertion, dizziness/lightheadedness with near syncopal event found to be febrile with intermittent wide complex tachycardia. Recent COVID19 moderna vaccination. CT chest  showing bilateral infiltrates. Community acquired pneumonia vs. Possible recurrent of MSSA bacteremia WBC 24.5, Procal 7.95, , Lactacte 6.9.    Mildly elevated filling pressures on presentation CVP 18, PA 35/20, PCWP 11. Likely some component of cardiogenic shock. LVAD parameters relatively stable despite markedly elevated LDH 8,531. Euvolemic on exam. Worsening renal function, Cr 4.62, up-trending LFT's/ INR. Legionella antigen positive. Weaned off of vasopressor and maintained on dobutamine. Acute hepatic injury which is improving. Requiring dialysis. Shock resolved.     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 Bhatti (ID clinic)   # Troponin elevation (likely demand ischemia)  # Essential Hypertension  # Hyperlipidemia        Patient with long standing history of NICM previously implanted LVAD (HM III) on 02/18/20 due to worsening functional status and systolic ventricular dysfunction (further complicated by RV dysfunction by VAD hemodynamic compensatory mechanism, VT in ICU now on Amiodarone and Atrial Fibrillation with AVR requiring DCCV on 02/28/20).      Elevated cardiac biomarkers on presentation, SGC with only mildly elevated filling pressures. Euvolemic on exam. Troponin elevation likely related to demand ischemia with no concerns for ACS. Most recent VAD interrogation without any significant Flow-Alarms    LDH 8,531. Initial concern for LVAD thrombus. However given relatively stable LVAD parameters, degree of LDH elevation is out of proportion. Will continue to monitor for LVAD alarms.      - continue  dobutamine  - Held ACE-I/ARB/ARNi: Lisinopril; due to worsening BERNARDA  - No BB; deferred 2/2 shock  - Aldosterone antagonist: deferred while other medical therapy is optimized  - SCD prophylaxis: ICD  - Fluid status : euvolemic, maintain euvolemic with CRRT  - MAP Goal: 65-85  - On Warfarin for HM-III INR Goal 2-3  - Continue Hydralazine   - Continue ASA 81 mg per oral daily       # Wide complex tachycardia. Atrial Flutter vs Afib vs Ventricular Tachycardia  # History of Atrial Fibrillation s/p DCCV/history of Atrial Flutter       Wide complex tachycardia HR 140s on presentation in the setting of febrile illness and likely pneumonia. Did not initially respond to adenosine. Concern for VT. Intermittently back into HR 60s with underlying rhythm of atrial flutter 3:1 conduction block. Per EP can not rule out VT, may be dual tachycardias.       - continue home amiodarone  - Ensure K+ >4.0 mEq/L and Mg+2 >2.0  - continue warfarin  - On cardiac telemetry  - EP on board    Pulmonary  #Hypoxemic Respiratory Failure  #Legionella Pneumonia  Hypoxemic in the setting of mixed cardiogenic septic shock requiring emergent intubation. O2 requirements quickly improved. S/p extubation on room air    Infectious disease    #Sepsis  #Legionella Pneumonia  #Bilateral pulmonary infiltrates  CT showing bilateral diffuse patchy consolidations concerning for infectious process. Low suspicion for recurrent driveline or possible hardware infection (history of MSSA Chronic Driveline Infection). Urine without pyuria. Urine legionella ag positive.   - f/u blood cultures  - continue Abx as per ID   -Cefazolin (2/18/21 - 3/1/21) followed by PO cefalexin   Azithromycin (2/15/21 - 2/25/21)    Renal:  # Acute on Chronic CKD stage IV likely 2/2 Septic Shock   s/p L TDC receiving IHD  - IHD per nephrology  - Daily Weight's  - Strict I/O's  - Daily BMP's  - Avoid any additional nephrotoxicity      GI  # Shock Liver   # Congestive Hepatopathy -  resolved  Hepatoccelular pattern of liver injury  > 3,496, AST 1,441 > 8,490 likely 2/2 to septic shock. INR downtrended s/p 3mg IV vit K. RUQ US without portal vein thrombus. LFTs downtrended nicely. No evidence of synthetic dysfunction.      Hematological   # Chronic Microcytic/Hypochromic Anemia (likely related to iron deficiency)  # Coagulopathy related to sepsis   # IgG Kappa monoclonal Gammopathy of undetermined significance  - Monitor Hgb (baseline 8-9g/dL)  - transfuse for Hb < 7.0   - Patient follows with [facility] and [facility] (Dr. Ibarra)     # Previous Concern For HIT  On previous hospitalization for LVAD placement (02/06/20-03/20/20), concern for HIT. 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    At that time, patient underwent two HIT panel tests (first one was negative and second antibody test was positive). No known thrombosis events. DAVINA antibody was negative raising question about validity of diagnosis . Per hematology at the time (Dr. James), no other cause for isolated thrombocytopenia. Immediate recovery of plts following d/c of heparin. 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     - avoiding heparin products  - continue warfarin     Endocrine  # Type II DM     > Last Hgb A1C: 6.8 (01/06/21)     - Continue PTA Insulin Basaglar 10 Units Aq at bedtime  - On Medium sliding scale insulin  - Oral Hypoglycemia Protocol      Neurologic:  # Disorientation/Toxic Metabolic Encephalopathy - Resolved  # Chronic Intracranial Small Vessel Disease        Patient reported some lightheadedness/dizziness and disorientation the days prior to hospitalization while at home. However, no reports of LOC, seizure activity, focal deficits, or findings for acute intracranial pathology on most recent OSH CT head w/o contrast (02/15/21). Mental status improved, s/p extubation      DVT  Prophylaxis: therapeutic warfarin  Guevara Catheter: not present  Code Status: Full Code  Fluids: no IVFs  Lines: RIJ TDC ()      Disposition Plan   Expected discharge: 2 - 3 days, recommended to transitional care unit once fluid volume status optimized on oral medication and safe disposition plan/ TCU bed available.    Entered: Daniel Fleming MD 2021, 6:15 PM       The patient's care was discussed with the Attending Physician, Dr. Judd.    Daniel Fleming MD  [facility]  Please see sign in/sign out for up to date coverage information    Interval History   Continued on low dose dobutamine. IHD yesterday with 0ml ultrafiltration. Ongoing urine output. Feels like he is a bit volume up. Denies SOB, fevers, chills.     Date CVP PA PCWP CO CI SVR SvO2   21 19 40/24 14 5.2 2.4 910 53%   21 8 34/16 12 4.6 2.15 991 48%   21 8 /18 18 4.8 2.2     21 10 32/16 16 4.1 1.9 1090 38%   21 12           Heartmate 3 (centrifugal flow) VS  Flow (Lpm): 4.4 Lpm  Pulse Index (PI): 4 PI  Speed (rpm): 5500 rpm  Power (muhammad): 4.2 muhammad  Current Hct settin    Data reviewed today: I reviewed all medications, new labs and imaging results over the last 24 hours.      Physical Exam   Vital Signs: Temp: 98.1  F (36.7  C) Temp src: Oral BP: 95/85 Pulse: 90   Resp: 25 SpO2: 99 % O2 Device: None (Room air)    Weight: 212 lbs 3.2 oz    In general, the patient is awake alert and oriented, sitting in bed in no apparent distress.      Neck: RIJ TDC in place. 8cm JVP  Heart: RRR. S1 and S2 no appreciated. Mechanical whir. No rub, click, or gallop.   Lungs: Bilateral crackles. No wheezes  GI: Soft, nontender, nondistended.   Extremities: trace edema. The pulses are 2+at the radial and DP bilaterally.  Neuro: grossly non focal  Skin: no rashes.    Data   Recent Labs   Lab 21  1715 21  0915 21  0519 21  1855 21  0442 21  0311  02/23/21  0311   WBC  --   --  9.5  --  10.8  --  13.5*   HGB  --  7.3* 6.9*  --  7.8*  --  8.2*   MCV  --   --  75*  --  75*  --  73*   PLT  --   --  239  --  244  --  249   INR  --   --  2.52*  --  2.56*  --  2.60*   *  --  132* 131* 132*   < > 133   POTASSIUM 3.8  --  4.6 4.7 4.1   < > 4.7   CHLORIDE 99  --  98 98 99   < > 102   CO2 25  --  24 23 25   < > 23   BUN 44*  --  71* 59* 44*   < > 49*   CR 3.78*  --  5.74* 5.03* 3.84*   < > 3.67*   ANIONGAP 8  --  10 9 8   < > 8   CALOS 8.2*  --  7.6* 7.9* 8.0*   < > 8.4*   *  --  136* 161* 125*   < > 127*   ALBUMIN 2.3*  --  2.4* 2.5* 2.2*   < > 2.2*   PROTTOTAL 6.4*  --  6.4* 6.7* 6.4*   < > 6.4*   BILITOTAL 0.5  --  0.5 0.6 0.7   < > 0.7   ALKPHOS 150  --  146 168* 160*   < > 178*   ALT 61  --  65 94* 66   < > 102*   *  --  112* 132* 146*   < > 215*    < > = values in this interval not displayed.     TTE 2/15/2021  Interpretation Summary  HM 3 at 5500 RPM  LV size is small, LVIDd = 3.0 cm. Severely reduced left ventricular function.  Doppler of the inflow cannula and outflow graft are normal.  RV is severely dilated. Severely reduced right ventricular function.  Mild TR is present.  The aortic valve is closed with mild regurgitation.  IVC is dilated and patient is on a ventilator.  No pericardial effusion present.

## 2021-02-26 NOTE — PLAN OF CARE
PT 6C: Cancel, pt needing to be get blood products at PM check in during scheduled time, PT unable to return. PT will reschedule.

## 2021-02-26 NOTE — PLAN OF CARE
D: Admitted 2/15/2021 with mixed cardiogenic and distributive shock with intermittent wide-complex tachycardia c/b hypoxemia requiring intubation, BERNARDA, and legionella pneumonia. PMHx relevant for NICM (LVEF 15-20%) s/p HM3 (02/2020) and ICD (03/2020), MSSA driveline infection and bacteremia, VT on amiodarone, nonobstructive CAD, severe MR, atrial fibrillation (AC warfarin), HTN, ABHINAV on home CPAP, CKD IV, DMII, HIT.    I: Monitored vitals and assessed pt status.   Changed:  -Urine output continues to be oliguric. (300mL out this shift)  -Hemoglobin 7.0 this AM  Running:  -Dobutamine at 3 mcg/kg/min  PRN:  -Ambien at HS    A: A0x4. VSS, on room air/home CPAP. HR 90s-100s on monitor. Rhythm ST with BBB vs paced rhythm? HM3 LVAD without alarms and numbers within parameters over night. Afebrile. Denies chest pain, palpitations, SOB, n/v/d. PIV saline locked. Double lumen PICC in RUE. Right internal jugular dialysis line . No BM this shift. Blood sugar checks ACHS/0200. Oliguric on HD. Appeared to sleep well between cares. Up with assist of 1.     I/O this shift:  In: -   Out: 300 [Urine:300]    Temp:  [97.9  F (36.6  C)-98.2  F (36.8  C)] 98  F (36.7  C)  Pulse:  [] 110  Resp:  [16-27] 16  BP: ()/() 107/80  SpO2:  [94 %-99 %] 97 %      P: Continue to monitor vitals, hgb, and kidney function closely. Pt status and report changes to treatment team.

## 2021-02-26 NOTE — PROGRESS NOTES
CLINICAL NUTRITION SERVICES     Nutrition Prescription    RECOMMENDATIONS FOR MDs/PROVIDERS TO ORDER:  See prior RD notes.    Malnutrition Status:    See prior RD notes.    Recommendations already ordered by Registered Dietitian (RD):  None additionally at this time.     Future/Additional Recommendations:  See prior RD notes.     Diet: 2 g Sodium diet. Ordered to receive Boost Plus, strawberry at 14:00 and vanilla at HS.   Intake: Since 2/24, good appetite and consuming 100% per % intake flowsheets.    Kcal counts:   2/24       Total Kcals: 741       Total Protein: 40g. # Meals Ordered from Kitchen: 3 meals. # Meals Recorded: 1 meal (First - 100% omelet w/ cheese, mushrooms & onion, hot tea). # Supplements Recorded: 100% 1 Boost Plus     2/25       Total Kcals: 1566     Total Protein: 77g. # Meals Ordered from Kitchen: 3 meals ordered from kitchen. # Meals Recorded: 2 meals, first meal - (100% Two omelets (2 eggs in each) w/ mushroom, cheese & onion, white toast) Second meal - (100% cheeseburger w/ onion, two ice creams, lemon ice)  # Supplements Recorded: 1 Boost Plus (100%)   * Pt consumed a two-day average of 1154 kcals and 59 g protein daily. However, not all data recorded. Estimated needs are 4797-2002 kcals/day (30-35 kcals/kg) and  grams protein/day (1.2-1.8 grams of pro/kg).      Follow up/Monitoring:  Will continue to follow pt.    Abby Woods, MS, RD, LD, [facility]   6C Pgr: [phone]

## 2021-02-26 NOTE — PROVIDER NOTIFICATION
Hemoglobin 7.0 and patient is asymptomatic. LVAD #'s and vitals stable. Cards 2 provider notified.

## 2021-02-27 LAB
ALBUMIN SERPL-MCNC: 2.6 G/DL (ref 3.4–5)
ALBUMIN SERPL-MCNC: 2.6 G/DL (ref 3.4–5)
ALP SERPL-CCNC: 146 U/L (ref 40–150)
ALP SERPL-CCNC: 148 U/L (ref 40–150)
ALT SERPL W P-5'-P-CCNC: 53 U/L (ref 0–70)
ALT SERPL W P-5'-P-CCNC: 53 U/L (ref 0–70)
ANION GAP SERPL CALCULATED.3IONS-SCNC: 12 MMOL/L (ref 3–14)
ANION GAP SERPL CALCULATED.3IONS-SCNC: 14 MMOL/L (ref 3–14)
APTT PPP: 40 SEC (ref 22–37)
AST SERPL W P-5'-P-CCNC: 76 U/L (ref 0–45)
AST SERPL W P-5'-P-CCNC: 80 U/L (ref 0–45)
BASOPHILS # BLD AUTO: 0 10E9/L (ref 0–0.2)
BASOPHILS NFR BLD AUTO: 0.4 %
BILIRUB SERPL-MCNC: 0.4 MG/DL (ref 0.2–1.3)
BILIRUB SERPL-MCNC: 0.5 MG/DL (ref 0.2–1.3)
BUN SERPL-MCNC: 71 MG/DL (ref 7–30)
BUN SERPL-MCNC: 78 MG/DL (ref 7–30)
CALCIUM SERPL-MCNC: 7.6 MG/DL (ref 8.5–10.1)
CALCIUM SERPL-MCNC: 7.7 MG/DL (ref 8.5–10.1)
CHLORIDE SERPL-SCNC: 92 MMOL/L (ref 94–109)
CHLORIDE SERPL-SCNC: 94 MMOL/L (ref 94–109)
CO2 SERPL-SCNC: 21 MMOL/L (ref 20–32)
CO2 SERPL-SCNC: 21 MMOL/L (ref 20–32)
CREAT SERPL-MCNC: 6.32 MG/DL (ref 0.66–1.25)
CREAT SERPL-MCNC: 6.69 MG/DL (ref 0.66–1.25)
DIFFERENTIAL METHOD BLD: ABNORMAL
EOSINOPHIL # BLD AUTO: 0.1 10E9/L (ref 0–0.7)
EOSINOPHIL NFR BLD AUTO: 1 %
ERYTHROCYTE [DISTWIDTH] IN BLOOD BY AUTOMATED COUNT: 22.7 % (ref 10–15)
FERRITIN SERPL-MCNC: 276 NG/ML (ref 26–388)
GFR SERPL CREATININE-BSD FRML MDRD: 8 ML/MIN/{1.73_M2}
GFR SERPL CREATININE-BSD FRML MDRD: 8 ML/MIN/{1.73_M2}
GLUCOSE BLDC GLUCOMTR-MCNC: 124 MG/DL (ref 70–99)
GLUCOSE BLDC GLUCOMTR-MCNC: 130 MG/DL (ref 70–99)
GLUCOSE BLDC GLUCOMTR-MCNC: 135 MG/DL (ref 70–99)
GLUCOSE BLDC GLUCOMTR-MCNC: 147 MG/DL (ref 70–99)
GLUCOSE BLDC GLUCOMTR-MCNC: 152 MG/DL (ref 70–99)
GLUCOSE SERPL-MCNC: 128 MG/DL (ref 70–99)
GLUCOSE SERPL-MCNC: 155 MG/DL (ref 70–99)
HCT VFR BLD AUTO: 25.4 % (ref 40–53)
HGB BLD-MCNC: 7.9 G/DL (ref 13.3–17.7)
IMM GRANULOCYTES # BLD: 0.3 10E9/L (ref 0–0.4)
IMM GRANULOCYTES NFR BLD: 3.5 %
INR PPP: 2.15 (ref 0.86–1.14)
IRON SATN MFR SERPL: 9 % (ref 15–46)
IRON SERPL-MCNC: 28 UG/DL (ref 35–180)
LDH SERPL L TO P-CCNC: 327 U/L (ref 85–227)
LYMPHOCYTES # BLD AUTO: 0.8 10E9/L (ref 0.8–5.3)
LYMPHOCYTES NFR BLD AUTO: 8.1 %
MCH RBC QN AUTO: 24.4 PG (ref 26.5–33)
MCHC RBC AUTO-ENTMCNC: 31.1 G/DL (ref 31.5–36.5)
MCV RBC AUTO: 78 FL (ref 78–100)
MONOCYTES # BLD AUTO: 1.2 10E9/L (ref 0–1.3)
MONOCYTES NFR BLD AUTO: 12.2 %
NEUTROPHILS # BLD AUTO: 7.3 10E9/L (ref 1.6–8.3)
NEUTROPHILS NFR BLD AUTO: 74.8 %
NRBC # BLD AUTO: 0 10*3/UL
NRBC BLD AUTO-RTO: 0 /100
PLATELET # BLD AUTO: 245 10E9/L (ref 150–450)
POTASSIUM SERPL-SCNC: 4.5 MMOL/L (ref 3.4–5.3)
POTASSIUM SERPL-SCNC: 4.6 MMOL/L (ref 3.4–5.3)
PROT SERPL-MCNC: 6.4 G/DL (ref 6.8–8.8)
PROT SERPL-MCNC: 7.1 G/DL (ref 6.8–8.8)
RBC # BLD AUTO: 3.24 10E12/L (ref 4.4–5.9)
SODIUM SERPL-SCNC: 126 MMOL/L (ref 133–144)
SODIUM SERPL-SCNC: 128 MMOL/L (ref 133–144)
TIBC SERPL-MCNC: 318 UG/DL (ref 240–430)
WBC # BLD AUTO: 9.8 10E9/L (ref 4–11)

## 2021-02-27 PROCEDURE — 250N000011 HC RX IP 250 OP 636

## 2021-02-27 PROCEDURE — 85025 COMPLETE CBC W/AUTO DIFF WBC: CPT | Performed by: STUDENT IN AN ORGANIZED HEALTH CARE EDUCATION/TRAINING PROGRAM

## 2021-02-27 PROCEDURE — 80053 COMPREHEN METABOLIC PANEL: CPT | Performed by: INTERNAL MEDICINE

## 2021-02-27 PROCEDURE — 250N000011 HC RX IP 250 OP 636: Performed by: STUDENT IN AN ORGANIZED HEALTH CARE EDUCATION/TRAINING PROGRAM

## 2021-02-27 PROCEDURE — 999N001017 HC STATISTIC GLUCOSE BY METER IP

## 2021-02-27 PROCEDURE — 83550 IRON BINDING TEST: CPT | Performed by: STUDENT IN AN ORGANIZED HEALTH CARE EDUCATION/TRAINING PROGRAM

## 2021-02-27 PROCEDURE — 250N000013 HC RX MED GY IP 250 OP 250 PS 637

## 2021-02-27 PROCEDURE — 83540 ASSAY OF IRON: CPT | Performed by: STUDENT IN AN ORGANIZED HEALTH CARE EDUCATION/TRAINING PROGRAM

## 2021-02-27 PROCEDURE — 214N000001 HC R&B CCU UMMC

## 2021-02-27 PROCEDURE — 82728 ASSAY OF FERRITIN: CPT | Performed by: STUDENT IN AN ORGANIZED HEALTH CARE EDUCATION/TRAINING PROGRAM

## 2021-02-27 PROCEDURE — 99233 SBSQ HOSP IP/OBS HIGH 50: CPT | Mod: GC | Performed by: INTERNAL MEDICINE

## 2021-02-27 PROCEDURE — 80053 COMPREHEN METABOLIC PANEL: CPT | Performed by: STUDENT IN AN ORGANIZED HEALTH CARE EDUCATION/TRAINING PROGRAM

## 2021-02-27 PROCEDURE — 250N000013 HC RX MED GY IP 250 OP 250 PS 637: Performed by: STUDENT IN AN ORGANIZED HEALTH CARE EDUCATION/TRAINING PROGRAM

## 2021-02-27 PROCEDURE — 250N000013 HC RX MED GY IP 250 OP 250 PS 637: Performed by: INTERNAL MEDICINE

## 2021-02-27 PROCEDURE — 85610 PROTHROMBIN TIME: CPT | Performed by: STUDENT IN AN ORGANIZED HEALTH CARE EDUCATION/TRAINING PROGRAM

## 2021-02-27 PROCEDURE — 85730 THROMBOPLASTIN TIME PARTIAL: CPT | Performed by: STUDENT IN AN ORGANIZED HEALTH CARE EDUCATION/TRAINING PROGRAM

## 2021-02-27 PROCEDURE — 93750 INTERROGATION VAD IN PERSON: CPT | Performed by: INTERNAL MEDICINE

## 2021-02-27 PROCEDURE — 83615 LACTATE (LD) (LDH) ENZYME: CPT | Performed by: STUDENT IN AN ORGANIZED HEALTH CARE EDUCATION/TRAINING PROGRAM

## 2021-02-27 PROCEDURE — 82668 ASSAY OF ERYTHROPOIETIN: CPT | Performed by: STUDENT IN AN ORGANIZED HEALTH CARE EDUCATION/TRAINING PROGRAM

## 2021-02-27 PROCEDURE — 99232 SBSQ HOSP IP/OBS MODERATE 35: CPT | Mod: 25 | Performed by: INTERNAL MEDICINE

## 2021-02-27 RX ORDER — WARFARIN SODIUM 1 MG/1
2 TABLET ORAL
Status: COMPLETED | OUTPATIENT
Start: 2021-02-27 | End: 2021-02-27

## 2021-02-27 RX ORDER — HYDRALAZINE HYDROCHLORIDE 25 MG/1
25 TABLET, FILM COATED ORAL ONCE
Status: COMPLETED | OUTPATIENT
Start: 2021-02-27 | End: 2021-02-27

## 2021-02-27 RX ORDER — BUMETANIDE 0.25 MG/ML
4 INJECTION INTRAMUSCULAR; INTRAVENOUS ONCE
Status: COMPLETED | OUTPATIENT
Start: 2021-02-27 | End: 2021-02-27

## 2021-02-27 RX ADMIN — ZOLPIDEM TARTRATE 5 MG: 5 TABLET, FILM COATED ORAL at 21:21

## 2021-02-27 RX ADMIN — HYDRALAZINE HYDROCHLORIDE 75 MG: 25 TABLET, FILM COATED ORAL at 21:20

## 2021-02-27 RX ADMIN — AMIODARONE HYDROCHLORIDE 200 MG: 200 TABLET ORAL at 07:59

## 2021-02-27 RX ADMIN — Medication 1 CAPSULE: at 07:59

## 2021-02-27 RX ADMIN — ALLOPURINOL 100 MG: 100 TABLET ORAL at 08:00

## 2021-02-27 RX ADMIN — HYDRALAZINE HYDROCHLORIDE 50 MG: 50 TABLET ORAL at 05:54

## 2021-02-27 RX ADMIN — FLUOXETINE 30 MG: 20 CAPSULE ORAL at 07:59

## 2021-02-27 RX ADMIN — BUMETANIDE 4 MG: 0.25 INJECTION INTRAMUSCULAR; INTRAVENOUS at 13:55

## 2021-02-27 RX ADMIN — FINASTERIDE 5 MG: 5 TABLET, FILM COATED ORAL at 07:59

## 2021-02-27 RX ADMIN — OXYMETAZOLINE HYDROCHLORIDE 2 SPRAY: 0.05 SPRAY NASAL at 14:38

## 2021-02-27 RX ADMIN — INSULIN ASPART 1 UNITS: 100 INJECTION, SOLUTION INTRAVENOUS; SUBCUTANEOUS at 17:30

## 2021-02-27 RX ADMIN — ASPIRIN 81 MG CHEWABLE TABLET 81 MG: 81 TABLET CHEWABLE at 07:59

## 2021-02-27 RX ADMIN — HYDRALAZINE HYDROCHLORIDE 75 MG: 25 TABLET, FILM COATED ORAL at 13:55

## 2021-02-27 RX ADMIN — WARFARIN SODIUM 2 MG: 1 TABLET ORAL at 17:35

## 2021-02-27 RX ADMIN — MELATONIN TAB 3 MG 3 MG: 3 TAB at 21:42

## 2021-02-27 RX ADMIN — DOBUTAMINE HYDROCHLORIDE 3 MCG/KG/MIN: 200 INJECTION INTRAVENOUS at 00:15

## 2021-02-27 RX ADMIN — OMEPRAZOLE 20 MG: 20 CAPSULE, DELAYED RELEASE ORAL at 08:00

## 2021-02-27 RX ADMIN — CEFAZOLIN SODIUM 2 G: 2 INJECTION, SOLUTION INTRAVENOUS at 19:58

## 2021-02-27 RX ADMIN — TAMSULOSIN HYDROCHLORIDE 0.4 MG: 0.4 CAPSULE ORAL at 07:59

## 2021-02-27 RX ADMIN — HYDRALAZINE HYDROCHLORIDE 25 MG: 25 TABLET, FILM COATED ORAL at 08:40

## 2021-02-27 ASSESSMENT — ACTIVITIES OF DAILY LIVING (ADL)
ADLS_ACUITY_SCORE: 15

## 2021-02-27 NOTE — PROGRESS NOTES
.    Nephrology Progress Note  02/26/2021         Assessment & Recommendations:   66 yo M with PMHx  NICM  s/p HM III , VT, severe MR, atrial fibrillation on warfarin, hypertension, CKD IV, DMII, HIT presented to OSH with fevers and cough in the setting of syncopal episode transferred to [facility] for further cares. Hospitalization complicated by Afib with RVR with hypotension and intubation with upgrade of cares to the ICU. Found to be in septic shock 2/2 legionella pneumonia with possible cardiogenic shock. Nephrology was consulted for evaluation and management of anuric BERNARDA     Oligoanuric BERNARDA 2/2 ischemic ATN  Baseline Cr last yr s/p LVED placement was around 1. On admission ~2.3 and doubled within 24 hours with rapid decrease in UOP. UA unimpressive with baseline proteinuria and new granular casts. Initiated on RRT 2/16. Monitoring for renal recovery, however has not had adequate UOP to maintain volume status and Cr/BUN rising even despite dialyzing yesterday.   - No HD today, reassess for dialysis need for tomorrow  - Agree with bumex dose today  - Monitor for recovery of renal function  - Avoid nephrotoxins as able  - Renally dose meds    Volume status:   Euvolemic to slightly hypervolemic.     Electrolytes - stable   Acid/base - stable   Anemia - Hb stable 8.4      Recommendations were communicated to primary team via note     Seen and discussed with Dr. Elisha Ag MD  [phone]    Interval History :   Nursing and provider notes from last 24 hours reviewed.    No events overnight. Tolerating dialysis well without any fatigue after his runs. He & his wife express concerns over the logistics of outpatient dialysis given his LVAD.   No SOB, no chest pain, minimal edema. He reports his appetite is improving and he's eating fairly well.      Review of Systems:   I reviewed the following systems:  GI: No nausea or vomiting or diarrhea.   Neuro:  No confusion  Constitutional:  No fever or chills  CV: No  dyspnea or edema.    Physical Exam:   I/O last 3 completed shifts:  In: 1590 [P.O.:1490; I.V.:100]  Out: 650 [Urine:650]   BP (!) 88/64 (BP Location: Right arm)   Pulse 103   Temp 97.7  F (36.5  C) (Oral)   Resp 16   Wt 98.2 kg (216 lb 8 oz)   SpO2 98%   BMI 33.91 kg/m       General : Pt awake, not in acute distress   Lungs : anterior lung fields are clear  Cardiac : LVAD in place  Abdomen : Soft/ND/NT  LE : trace edema noted  Dialysis Access : R internal jugular catheter    Labs:   All labs reviewed by me  Electrolytes/Renal -   Recent Labs   Lab Test 02/26/21  1724 02/26/21  0424 02/25/21  1715 02/22/21  1539 02/22/21  1539 02/22/21  0353 02/22/21  0353 02/21/21  1617 02/21/21  1617   * 128* 132*   < >  --    < > 136   < >  --    POTASSIUM 4.4 4.0 3.8   < >  --    < > 4.7   < >  --    CHLORIDE 94 94 99   < >  --    < > 106   < >  --    CO2 20 24 25   < >  --    < > 24   < >  --    BUN 65* 58* 44*   < >  --    < > 30   < >  --    CR 5.59* 4.81* 3.78*   < >  --    < > 1.94*   < >  --    * 105* 120*   < >  --    < > 109*   < >  --    CALOS 7.9* 7.7* 8.2*   < >  --    < > 8.1*   < >  --    MAG  --   --   --   --  2.6*  --  2.5*  --  2.6*   PHOS  --   --   --   --  5.2*  --  4.4  --  4.5    < > = values in this interval not displayed.       CBC -   Recent Labs   Lab Test 02/26/21  1724 02/26/21 0424 02/25/21  0915 02/25/21  0519 02/24/21  0442   WBC  --  9.1  --  9.5 10.8   HGB 8.4* 7.0* 7.3* 6.9* 7.8*   PLT  --  228  --  239 244       LFTs -   Recent Labs   Lab Test 02/26/21  1724 02/26/21  0424 02/25/21  1715   ALKPHOS 160* 147 150   BILITOTAL 0.6 0.6 0.5   ALT 60 52 61   AST 91* 91* 100*   PROTTOTAL 7.2 6.4* 6.4*   ALBUMIN 2.7* 2.4* 2.3*       Iron Panel -   Recent Labs   Lab Test 11/16/20  0616 02/08/20  0408   IRON 16* 25*   IRONSAT 6* 8*   HEAVENLY 75 189         Imaging:    Current Medications:    allopurinol  100 mg Oral or Feeding Tube Daily     amiodarone  200 mg Oral Daily     aspirin  81 mg  Oral Daily     ceFAZolin  2 g Intravenous Daily     finasteride  5 mg Oral Daily     FLUoxetine  30 mg Oral Daily     hydrALAZINE  50 mg Oral or Feeding Tube Q8H BRITTANI     insulin aspart  1-7 Units Subcutaneous TID AC     insulin aspart  1-5 Units Subcutaneous At Bedtime     melatonin  3 mg Oral At Bedtime     multivitamin RENAL  1 capsule Oral Daily     omeprazole  20 mg Oral QAM AC     oxymetazoline  2 spray Both Nostrils BID     senna-docusate  2 tablet Oral BID     tamsulosin  0.4 mg Oral Daily       dextrose       dextrose Stopped (02/17/21 1600)     DOBUTamine 3 mcg/kg/min (02/26/21 0739)     Warfarin Therapy Reminder       Wendytim Ag MD     I have seen and examined this patient with the fellow.  This note reflects our joint assessment and plan.     Anila Tello MD

## 2021-02-27 NOTE — PLAN OF CARE
D: Admitted 2/15/2021 with mixed cardiogenic and distributive shock with intermittent wide-complex tachycardia c/b hypoxemia requiring intubation, BERNARDA, and legionella pneumonia. PMHx relevant for NICM (LVEF 15-20%) s/p HM3 (02/2020) and ICD (03/2020), MSSA driveline infection and bacteremia, VT on amiodarone, nonobstructive CAD, severe MR, atrial fibrillation (AC warfarin), HTN, ABHINAV on home CPAP, CKD IV, DMII, HIT.     I: Monitored vitals and assessed pt status.   Changed:  -Urine output continues to be oliguric.   -Developed nose bleed over night - cross cover notified. Managed with gauze packing, pressure and ice packs.   Running:  -Dobutamine at 3 mcg/kg/min  PRN:  -Ambien at HS  -One time zofran     A: A0x4. VSS, on room air/home CPAP. HR 90s-100s on monitor. Rhythm ST with BBB. HM3 LVAD without alarms and numbers within parameters over night. Afebrile. Denies chest pain, palpitations, SOB. Endorsed some nausea at HS, improved with po zofran. PIV saline locked. Double lumen PICC in RUE. Right internal jugular dialysis line . No BM this shift. Blood sugar checks ACHS/0200. Oliguric on HD (550 out this shift). Appeared to sleep well between cares. Up with assist of 1.           BP 93/83 (BP Location: Left arm)   Pulse 108   Temp 97.5  F (36.4  C) (Axillary)   Resp 18   Wt 99.4 kg (219 lb 3.2 oz)   SpO2 95%   BMI 34.33 kg/m      P: Continue to monitor vitals, hgb, and kidney function closely. Anticipate possible dialysis run today. Update cards 2 team with changes/concerns.

## 2021-02-27 NOTE — PROGRESS NOTES
.    Nephrology Progress Note  02/27/2021         Assessment & Recommendations:   66 yo M with PMHx  NICM  s/p HM III , VT, severe MR, atrial fibrillation on warfarin, hypertension, CKD IV, DMII, HIT presented to OSH with fevers and cough in the setting of syncopal episode transferred to [facility] for further cares. Hospitalization complicated by Afib with RVR with hypotension and intubation with upgrade of cares to the ICU. Found to be in septic shock 2/2 legionella pneumonia with possible cardiogenic shock. Nephrology was consulted for evaluation and management of anuric BERNARDA. Started on RRT 2/16.     Oligoanuric BERNARDA 2/2 ischemic ATN  Baseline Cr last yr s/p LVAD placement was around 1. On admission ~2.3 and doubled within 24 hours with rapid decrease in UOP. UA unimpressive with baseline proteinuria and new granular casts. Initiated on RRT 2/16. Monitoring for renal recovery and now having improved UOP. Will hold off on dialysis for today and reassess with tomorrow's labs  - No HD today, reassess for dialysis need for tomorrow  - Agree with bumex   - Avoid nephrotoxins as able  - Renally dose meds    Volume status:   Euvolemic to slightly hypervolemic.     Electrolytes - stable   Acid/base - stable   Anemia - Hb stable 7.9     Recommendations were communicated to primary team via note     Seen and discussed with Dr. Miriam Ag MD  [phone]    Interval History :   Nursing and provider notes from last 24 hours reviewed.    No events overnight. Did not dialyze yesterday and now having improved UOP today. Discussed he still may need dialysis tomorrow but we will hold off for today. No SOB, no chest pain. Minimal LE edema      Review of Systems:   4pt ROS negative except as above in HPI    Physical Exam:   I/O last 3 completed shifts:  In: 1555.65 [P.O.:960; I.V.:428.85]  Out: 1350 [Urine:1350]   BP (!) 88/72 (BP Location: Right arm)   Pulse 133   Temp 97.3  F (36.3  C) (Oral)   Resp 16   Wt 99.4 kg (219 lb  3.2 oz)   SpO2 99%   BMI 34.33 kg/m       General : Pt awake, not in acute distress   Lungs : anterior lung fields are clear  Cardiac : LVAD in place  Abdomen : Soft/ND/NT  LE : trace edema noted  Dialysis Access : R internal jugular catheter    Labs:   All labs reviewed by me  Electrolytes/Renal -   Recent Labs   Lab Test 02/27/21 0415 02/26/21 1724 02/26/21 0424 02/22/21  1539 02/22/21  1539 02/22/21  0353 02/22/21  0353 02/21/21  1617 02/21/21 1617   * 126* 128*   < >  --    < > 136   < >  --    POTASSIUM 4.5 4.4 4.0   < >  --    < > 4.7   < >  --    CHLORIDE 94 94 94   < >  --    < > 106   < >  --    CO2 21 20 24   < >  --    < > 24   < >  --    BUN 71* 65* 58*   < >  --    < > 30   < >  --    CR 6.32* 5.59* 4.81*   < >  --    < > 1.94*   < >  --    * 177* 105*   < >  --    < > 109*   < >  --    CALOS 7.6* 7.9* 7.7*   < >  --    < > 8.1*   < >  --    MAG  --   --   --   --  2.6*  --  2.5*  --  2.6*   PHOS  --   --   --   --  5.2*  --  4.4  --  4.5    < > = values in this interval not displayed.       CBC -   Recent Labs   Lab Test 02/27/21 0415 02/26/21 1724 02/26/21 0424 02/25/21 0519 02/25/21 0519   WBC 9.8  --  9.1  --  9.5   HGB 7.9* 8.4* 7.0*   < > 6.9*     --  228  --  239    < > = values in this interval not displayed.       LFTs -   Recent Labs   Lab Test 02/27/21 0415 02/26/21 1724 02/26/21 0424   ALKPHOS 148 160* 147   BILITOTAL 0.5 0.6 0.6   ALT 53 60 52   AST 80* 91* 91*   PROTTOTAL 6.4* 7.2 6.4*   ALBUMIN 2.6* 2.7* 2.4*       Iron Panel -   Recent Labs   Lab Test 02/27/21  0415 11/16/20  0616 02/08/20  0408   IRON 28* 16* 25*   IRONSAT 9* 6* 8*   HEAVENLY 276 75 189         Imaging:    Current Medications:    allopurinol  100 mg Oral or Feeding Tube Daily     amiodarone  200 mg Oral Daily     aspirin  81 mg Oral Daily     ceFAZolin  2 g Intravenous Daily     finasteride  5 mg Oral Daily     FLUoxetine  30 mg Oral Daily     hydrALAZINE  75 mg Oral or Feeding Tube Q8H BRITTANI      insulin aspart  1-7 Units Subcutaneous TID AC     insulin aspart  1-5 Units Subcutaneous At Bedtime     melatonin  3 mg Oral At Bedtime     multivitamin RENAL  1 capsule Oral Daily     omeprazole  20 mg Oral QAM AC     oxymetazoline  2 spray Both Nostrils BID     senna-docusate  2 tablet Oral BID     tamsulosin  0.4 mg Oral Daily     warfarin ANTICOAGULANT  2 mg Oral ONCE at 18:00       dextrose       dextrose Stopped (02/17/21 1600)     DOBUTamine 3 mcg/kg/min (02/27/21 0733)     Warfarin Therapy Reminder       Wendytim Ag MD     Patient was seen and evaluated by me, Yosvany Pineda MD. I have reviewed the note and agree with the the plan of care as documented by the fellow.

## 2021-02-27 NOTE — PROGRESS NOTES
Admitted 2/15 for cardiogenic shock, septic shock, and acute renal failure. Arrived with a-fib. Hx of unstable V-tach (on amiodarone), NICM s/p HM3 2/18/2020, MSSA driveline infection and bacteremia, nonobstructive CAD, severe MR, a-fib on warfarin, HTN, ABHINAV, CKD stage 4, DM2, HLD, and HIT.    Neuro: A&Ox4.   Cardiac: Afebrile, ST w/BBB.    Respiratory: RA   GI/: Voiding spontaneously. No BM this shift.   Diet/appetite: Tolerating 2g sodium diet. Denies nausea   Activity: Up with 1 assist    Pain: Denies   Skin: No new deficits noted.  Lines: R DL PICC SL   Drains: none  Replacement: none     Pt had bloody nose throughout day that persisted. Continued with Afrin nostril spray, cold pack/ice, and pressure; nose bleed persisting.     Dobutamine @ 3mcg/kg/min continuous.    Bumex 4mg x1 given.     Nephrology held on HD; creatinine up, but electrolytes trending well. Review labs tomorrow to see if HD is needed.       Pt has been resting comfortably, will continue to monitor and follow plan of care. Contact Cards 2 with concerns.

## 2021-02-27 NOTE — PLAN OF CARE
D: Patient with NICM and HM3 admitted on 2/15/21 with mixed cardiogenic and distributive shock with intermittent wide complex tachycardia and hypoxemia requiring intubation, also found to have legionella pneumonia on IV antibiotics.     I/A: Monitored vitals and assessed patient status. A0x4. VSS. Sinus tachycardia low 100's. Afebrile. Oliguric. 3 mg IV Bumex given. Voids in urinal. Up with 1-2 assist, GB and walker.    Completed: 1 unit RBC administered for hgb of 7.0; recheck = 8.4  Running: Dobutamine at 3 mcg/kg/min    P: Continue to monitor patient status and report changes to treatment team.

## 2021-02-27 NOTE — PROGRESS NOTES
[facility]    Cardiology Progress Note- Cards 2        Date of Admission:  2/15/2021     Assessment & Plan: S   Eliseo Tanner is a 65 year old male with PMHx relevant for NICM with LVEF 15-20%, NYHA III s/p HM III 2/18/2020 (post op complicated by retrosternal hematoma with bleeding in the lungs), s/p ICD placed on 3/16/2020, h/o MSSA driveline infection and bacteremia 11/5/2020 on prophylactic cephalexin, h/o VT on amiodarone, moderate nonobstructive CAD, severe MR, atrial fibrillation on warfarin, hypertension, ABHINAV requiring CPAP, CKD IV, DMII, HLP,  h/o HIT who presented to the Cardiovascular Unit (4E Cardiac ICU) with mixed cardiogenic and distributive shock withan intermittent wide complex tachycardia. Initially requiring vasopressors and inotropes, intubated for hypoxemia. Treated for legionella pneumonia. Stabilized on low dose dobutamine off of pressors. Extubated. With persistent oliguric renal failure.     Today's Plan  - Continue low dose dobutamine  - Bumetanide 4mg IV  - Increase hydralazine to 75mg tid  - Continue antibiotics :              -Cefazolin (2/18/21 - 3/1/21) followed by PO cefalexin               Azithromycin (2/15/21 - 2/25/21)               Cefepime (2/16/21 - 2/18/21) (deescalated to cefazolin)               Vancomycin (2/15/21 - 2/18/21) (MRSA nares negative)               Piperacillin tazobactam (2/15/21-2/16/21) (switched for renal protection)  - ongoing PT/OT  - continue warfarin  - Social work arranging dialysis and placement    Cardiovascular:     #Mixed Cardiogenic and Septic Shock - Improved  #Multiorgan Failure - Improved  Presented from [facility] with subacute development of shortness of breath, dyspnea on exertion, dizziness/lightheadedness with near syncopal event found to be febrile with intermittent wide complex tachycardia. Recent COVID19 moderna vaccination. CT chest showing bilateral  infiltrates. Community acquired pneumonia vs. Possible recurrent of MSSA bacteremia WBC 24.5, Procal 7.95, , Lactacte 6.9.     Mildly elevated filling pressures on presentation CVP 18, PA 35/20, PCWP 11. Likely some component of cardiogenic shock. LVAD parameters relatively stable despite markedly elevated LDH 8,531. Euvolemic on exam. Worsening renal function, Cr 4.62, up-trending LFT's/ INR. Legionella antigen positive. Weaned off of vasopressor and maintained on dobutamine. Acute hepatic injury which is improving. Requiring dialysis. Shock resolved.      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 Bhatti (ID clinic)   # Troponin elevation (likely demand ischemia)  # Essential Hypertension  # Hyperlipidemia        Patient with long standing history of NICM previously implanted LVAD (HM III) on 02/18/20 due to worsening functional status and systolic ventricular dysfunction (further complicated by RV dysfunction by VAD hemodynamic compensatory mechanism, VT in ICU now on Amiodarone and Atrial Fibrillation with AVR requiring DCCV on 02/28/20).      Elevated cardiac biomarkers on presentation, SGC with only mildly elevated filling pressures. Euvolemic on exam. Troponin elevation likely related to demand ischemia with no concerns for ACS. Most recent VAD interrogation without any significant Flow-Alarms     LDH 8,531. Initial concern for LVAD thrombus. However given relatively stable LVAD parameters, degree of LDH elevation is out of proportion. Will continue to monitor for LVAD alarms.      - continue dobutamine  -  Held ACE-I/ARB/ARNi: Lisinopril; due to worsening BERNARDA  - No BB; deferred 2/2 shock  - Aldosterone antagonist: deferred while other medical therapy is optimized  - SCD prophylaxis: ICD  - Fluid status : hypervolemic, IV diuretic today, further management per dialysis  - MAP Goal: 65-90  - On Warfarin for HM-III INR Goal 2-3  - Continue Hydralazine, increase to 75mg TID  - Continue ASA 81 mg per oral daily       # Wide complex tachycardia. Atrial Flutter vs Afib vs Ventricular Tachycardia  # History of Atrial Fibrillation s/p DCCV/history of Atrial Flutter       Wide complex tachycardia HR 140s on presentation in the setting of febrile illness and likely pneumonia. Did not initially respond to adenosine. Concern for VT. Intermittently back into HR 60s with underlying rhythm of atrial flutter 3:1 conduction block. Per EP can not rule out VT, may be dual tachycardias.       - continue home amiodarone  - Ensure K+ >4.0 mEq/L and Mg+2 >2.0  - continue warfarin  - On cardiac telemetry  - EP on board     Pulmonary  #Legionella Pneumonia  Hypoxemic in the setting of mixed cardiogenic septic shock requiring emergent intubation. O2 requirements quickly improved. S/p extubation on room air     Infectious disease     #Sepsis  #Legionella Pneumonia  #Bilateral pulmonary infiltrates  CT showing bilateral diffuse patchy consolidations concerning for infectious process. Low suspicion for recurrent driveline or possible hardware infection (history of MSSA Chronic Driveline Infection). Urine without pyuria. Urine legionella ag positive.   - f/u blood cultures  - continue Abx as per ID              -Cefazolin (2/18/21 - 3/1/21) followed by PO cefalexin              Azithromycin (2/15/21 - 2/25/21)     Renal:  # Acute on Chronic CKD stage IV likely 2/2 Septic Shock   s/p L TDC receiving IHD  - IHD per nephrology  - Daily Weight's  - Strict I/O's  - Daily BMP's  - Avoid any additional nephrotoxicity   - Repeat IV bumex today     GI  #  Shock Liver   # Congestive Hepatopathy - resolved  Hepatoccelular pattern of liver injury  > 3,496, AST 1,441 > 8,490 likely 2/2 to septic shock. INR downtrended s/p 3mg IV vit K. RUQ US without portal vein thrombus. LFTs downtrended nicely. No evidence of synthetic dysfunction.      Hematological   # Chronic Microcytic/Hypochromic Anemia (likely related to iron deficiency)  # Coagulopathy related to sepsis   # IgG Kappa monoclonal Gammopathy of undetermined significance  - Monitor Hgb (baseline 8-9g/dL)  - transfuse for Hb < 7.0   - Patient [facility] (Dr. Ibarra)     # Previous Concern For HIT  On previous hospitalization for LVAD placement (02/06/20-03/20/20), concern for HIT. 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     At that time, patient underwent two HIT panel tests (first one was negative and second antibody test was positive). No known thrombosis events. DAVINA antibody was negative raising question about validity of diagnosis . Per hematology at the time (Dr. James), no other cause for isolated thrombocytopenia. Immediate recovery of plts following d/c of heparin. 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      - avoiding heparin products  - continue warfarin     Endocrine  # Type II DM     > Last Hgb A1C: 6.8 (01/06/21)     - Continue PTA Insulin Basaglar 10 Units Aq at bedtime  - On Medium sliding scale insulin  - Oral Hypoglycemia Protocol      Neurologic:  # Disorientation/Toxic Metabolic Encephalopathy - Resolved  # Chronic Intracranial Small Vessel Disease        Patient reported some lightheadedness/dizziness and disorientation the days prior to hospitalization while at home. However, no reports of LOC, seizure activity, focal deficits, or findings for acute intracranial pathology on most recent OSH CT head w/o contrast (02/15/21). Mental status  improved, s/p extubation        DVT Prophylaxis: therapeutic warfarin  Guevara Catheter: not present  Code Status: Full Code  Fluids: no IVFs  Lines: RIJ TDC (2/23)    Disposition Plan   Pending establishment of long-term dialysis needs      Entered: Rudy Goodrich MD 02/27/2021, 6:48 AM       The patient's care was discussed with the Attending Physician, Dr. Jiang.    Rudy Goodrich MD   Cardiology Fellow  [address]  Please see sign in/sign out for up to date coverage information  ______________________________________________________________________    Interval History   Did not sleep well. No other complaints. Volume up for the day. BP elevated. No pain. No shortness of breath. No palpitations. No fevers or chills. No cough. Did have episode of epistaxis overnight which resolved with pressure application after ~1 hr. Hemoglobin stable.    Data reviewed today: I reviewed all medications, new labs and imaging results over the last 24 hours.    Physical Exam   Vital Signs: Temp: 97.5  F (36.4  C) Temp src: Axillary BP: 93/83 Pulse: 108   Resp: 18 SpO2: 95 % O2 Device: None (Room air)    Weight: 219 lbs 3.2 oz   Gen: awake and in NAD  HEENT: gauze in Left nares, RIJ TDC in place. ~14cm JVP  Heart: LVAD hum.   Lungs: Bilateral crackles. No wheezes  GI: Soft, nontender, nondistended.   Extremities: WWP, trace edema  Neuro: grossly non focal  Skin: no rashes.    Data   Recent Labs   Lab 02/27/21  0415 02/26/21  1724 02/26/21  0424 02/25/21  0519 02/25/21  0519   WBC 9.8  --  9.1  --  9.5   HGB 7.9* 8.4* 7.0*   < > 6.9*   MCV 78  --  76*  --  75*     --  228  --  239   INR 2.15*  --  2.30*  --  2.52*   * 126* 128*   < > 132*   POTASSIUM 4.5 4.4 4.0   < > 4.6   CHLORIDE 94 94 94   < > 98   CO2 21 20 24   < > 24   BUN 71* 65* 58*   < > 71*   CR 6.32* 5.59* 4.81*   < > 5.74*   ANIONGAP 14 12 10   < > 10   CALOS 7.6* 7.9* 7.7*   < > 7.6*   * 177*  105*   < > 136*   ALBUMIN 2.6* 2.7* 2.4*   < > 2.4*   PROTTOTAL 6.4* 7.2 6.4*   < > 6.4*   BILITOTAL 0.5 0.6 0.6   < > 0.5   ALKPHOS 148 160* 147   < > 146   ALT 53 60 52   < > 65   AST 80* 91* 91*   < > 112*    < > = values in this interval not displayed.     No results found for this or any previous visit (from the past 24 hour(s)).  Medications     dextrose       dextrose Stopped (02/17/21 1600)     DOBUTamine 3 mcg/kg/min (02/27/21 0733)     Warfarin Therapy Reminder         allopurinol  100 mg Oral or Feeding Tube Daily     amiodarone  200 mg Oral Daily     aspirin  81 mg Oral Daily     ceFAZolin  2 g Intravenous Daily     finasteride  5 mg Oral Daily     FLUoxetine  30 mg Oral Daily     hydrALAZINE  75 mg Oral or Feeding Tube Q8H BRITTANI     insulin aspart  1-7 Units Subcutaneous TID AC     insulin aspart  1-5 Units Subcutaneous At Bedtime     melatonin  3 mg Oral At Bedtime     multivitamin RENAL  1 capsule Oral Daily     omeprazole  20 mg Oral QAM AC     oxymetazoline  2 spray Both Nostrils BID     senna-docusate  2 tablet Oral BID     tamsulosin  0.4 mg Oral Daily          details…

## 2021-02-28 LAB
ALBUMIN SERPL-MCNC: 2.6 G/DL (ref 3.4–5)
ALBUMIN SERPL-MCNC: 2.7 G/DL (ref 3.4–5)
ALP SERPL-CCNC: 138 U/L (ref 40–150)
ALP SERPL-CCNC: 150 U/L (ref 40–150)
ALT SERPL W P-5'-P-CCNC: 46 U/L (ref 0–70)
ALT SERPL W P-5'-P-CCNC: 48 U/L (ref 0–70)
ANION GAP SERPL CALCULATED.3IONS-SCNC: 10 MMOL/L (ref 3–14)
ANION GAP SERPL CALCULATED.3IONS-SCNC: 12 MMOL/L (ref 3–14)
APTT PPP: 41 SEC (ref 22–37)
AST SERPL W P-5'-P-CCNC: 62 U/L (ref 0–45)
AST SERPL W P-5'-P-CCNC: 66 U/L (ref 0–45)
BASOPHILS # BLD AUTO: 0 10E9/L (ref 0–0.2)
BASOPHILS NFR BLD AUTO: 0.2 %
BILIRUB SERPL-MCNC: 0.5 MG/DL (ref 0.2–1.3)
BILIRUB SERPL-MCNC: 1.4 MG/DL (ref 0.2–1.3)
BLD PROD TYP BPU: NORMAL
BLD UNIT ID BPU: 0
BLOOD PRODUCT CODE: NORMAL
BPU ID: NORMAL
BUN SERPL-MCNC: 49 MG/DL (ref 7–30)
BUN SERPL-MCNC: 84 MG/DL (ref 7–30)
CALCIUM SERPL-MCNC: 7.8 MG/DL (ref 8.5–10.1)
CALCIUM SERPL-MCNC: 8.1 MG/DL (ref 8.5–10.1)
CHLORIDE SERPL-SCNC: 94 MMOL/L (ref 94–109)
CHLORIDE SERPL-SCNC: 95 MMOL/L (ref 94–109)
CO2 SERPL-SCNC: 22 MMOL/L (ref 20–32)
CO2 SERPL-SCNC: 25 MMOL/L (ref 20–32)
CREAT SERPL-MCNC: 4.44 MG/DL (ref 0.66–1.25)
CREAT SERPL-MCNC: 7.07 MG/DL (ref 0.66–1.25)
DIFFERENTIAL METHOD BLD: ABNORMAL
EOSINOPHIL # BLD AUTO: 0.1 10E9/L (ref 0–0.7)
EOSINOPHIL NFR BLD AUTO: 1.3 %
ERYTHROCYTE [DISTWIDTH] IN BLOOD BY AUTOMATED COUNT: 22.6 % (ref 10–15)
GFR SERPL CREATININE-BSD FRML MDRD: 13 ML/MIN/{1.73_M2}
GFR SERPL CREATININE-BSD FRML MDRD: 7 ML/MIN/{1.73_M2}
GLUCOSE BLDC GLUCOMTR-MCNC: 123 MG/DL (ref 70–99)
GLUCOSE BLDC GLUCOMTR-MCNC: 138 MG/DL (ref 70–99)
GLUCOSE BLDC GLUCOMTR-MCNC: 142 MG/DL (ref 70–99)
GLUCOSE SERPL-MCNC: 127 MG/DL (ref 70–99)
GLUCOSE SERPL-MCNC: 186 MG/DL (ref 70–99)
HCT VFR BLD AUTO: 21.6 % (ref 40–53)
HGB BLD-MCNC: 6.7 G/DL (ref 13.3–17.7)
IMM GRANULOCYTES # BLD: 0.3 10E9/L (ref 0–0.4)
IMM GRANULOCYTES NFR BLD: 2.5 %
INR PPP: 2.24 (ref 0.86–1.14)
LDH SERPL L TO P-CCNC: 316 U/L (ref 85–227)
LYMPHOCYTES # BLD AUTO: 0.6 10E9/L (ref 0.8–5.3)
LYMPHOCYTES NFR BLD AUTO: 6 %
MCH RBC QN AUTO: 25.1 PG (ref 26.5–33)
MCHC RBC AUTO-ENTMCNC: 31 G/DL (ref 31.5–36.5)
MCV RBC AUTO: 81 FL (ref 78–100)
MONOCYTES # BLD AUTO: 1.2 10E9/L (ref 0–1.3)
MONOCYTES NFR BLD AUTO: 12 %
NEUTROPHILS # BLD AUTO: 7.8 10E9/L (ref 1.6–8.3)
NEUTROPHILS NFR BLD AUTO: 78 %
NRBC # BLD AUTO: 0 10*3/UL
NRBC BLD AUTO-RTO: 0 /100
PLATELET # BLD AUTO: 261 10E9/L (ref 150–450)
POTASSIUM SERPL-SCNC: 3.8 MMOL/L (ref 3.4–5.3)
POTASSIUM SERPL-SCNC: 4.6 MMOL/L (ref 3.4–5.3)
PROT SERPL-MCNC: 7 G/DL (ref 6.8–8.8)
PROT SERPL-MCNC: 7.2 G/DL (ref 6.8–8.8)
RBC # BLD AUTO: 2.67 10E12/L (ref 4.4–5.9)
SODIUM SERPL-SCNC: 128 MMOL/L (ref 133–144)
SODIUM SERPL-SCNC: 130 MMOL/L (ref 133–144)
TRANSFUSION STATUS PATIENT QL: NORMAL
TRANSFUSION STATUS PATIENT QL: NORMAL
WBC # BLD AUTO: 10 10E9/L (ref 4–11)

## 2021-02-28 PROCEDURE — 85610 PROTHROMBIN TIME: CPT | Performed by: INTERNAL MEDICINE

## 2021-02-28 PROCEDURE — 86923 COMPATIBILITY TEST ELECTRIC: CPT | Performed by: STUDENT IN AN ORGANIZED HEALTH CARE EDUCATION/TRAINING PROGRAM

## 2021-02-28 PROCEDURE — 250N000013 HC RX MED GY IP 250 OP 250 PS 637: Performed by: STUDENT IN AN ORGANIZED HEALTH CARE EDUCATION/TRAINING PROGRAM

## 2021-02-28 PROCEDURE — 85730 THROMBOPLASTIN TIME PARTIAL: CPT | Performed by: INTERNAL MEDICINE

## 2021-02-28 PROCEDURE — 99232 SBSQ HOSP IP/OBS MODERATE 35: CPT | Mod: 25 | Performed by: INTERNAL MEDICINE

## 2021-02-28 PROCEDURE — 87340 HEPATITIS B SURFACE AG IA: CPT | Performed by: STUDENT IN AN ORGANIZED HEALTH CARE EDUCATION/TRAINING PROGRAM

## 2021-02-28 PROCEDURE — 250N000013 HC RX MED GY IP 250 OP 250 PS 637

## 2021-02-28 PROCEDURE — P9016 RBC LEUKOCYTES REDUCED: HCPCS | Performed by: STUDENT IN AN ORGANIZED HEALTH CARE EDUCATION/TRAINING PROGRAM

## 2021-02-28 PROCEDURE — 80053 COMPREHEN METABOLIC PANEL: CPT | Performed by: INTERNAL MEDICINE

## 2021-02-28 PROCEDURE — 86706 HEP B SURFACE ANTIBODY: CPT | Performed by: STUDENT IN AN ORGANIZED HEALTH CARE EDUCATION/TRAINING PROGRAM

## 2021-02-28 PROCEDURE — 250N000013 HC RX MED GY IP 250 OP 250 PS 637: Performed by: INTERNAL MEDICINE

## 2021-02-28 PROCEDURE — 999N001017 HC STATISTIC GLUCOSE BY METER IP

## 2021-02-28 PROCEDURE — 258N000003 HC RX IP 258 OP 636: Performed by: STUDENT IN AN ORGANIZED HEALTH CARE EDUCATION/TRAINING PROGRAM

## 2021-02-28 PROCEDURE — 250N000011 HC RX IP 250 OP 636: Performed by: STUDENT IN AN ORGANIZED HEALTH CARE EDUCATION/TRAINING PROGRAM

## 2021-02-28 PROCEDURE — 86901 BLOOD TYPING SEROLOGIC RH(D): CPT | Performed by: STUDENT IN AN ORGANIZED HEALTH CARE EDUCATION/TRAINING PROGRAM

## 2021-02-28 PROCEDURE — 83615 LACTATE (LD) (LDH) ENZYME: CPT | Performed by: INTERNAL MEDICINE

## 2021-02-28 PROCEDURE — 90937 HEMODIALYSIS REPEATED EVAL: CPT

## 2021-02-28 PROCEDURE — 93750 INTERROGATION VAD IN PERSON: CPT | Performed by: INTERNAL MEDICINE

## 2021-02-28 PROCEDURE — 99233 SBSQ HOSP IP/OBS HIGH 50: CPT | Mod: GC | Performed by: INTERNAL MEDICINE

## 2021-02-28 PROCEDURE — 85025 COMPLETE CBC W/AUTO DIFF WBC: CPT | Performed by: INTERNAL MEDICINE

## 2021-02-28 PROCEDURE — 86850 RBC ANTIBODY SCREEN: CPT | Performed by: STUDENT IN AN ORGANIZED HEALTH CARE EDUCATION/TRAINING PROGRAM

## 2021-02-28 PROCEDURE — 86900 BLOOD TYPING SEROLOGIC ABO: CPT | Performed by: STUDENT IN AN ORGANIZED HEALTH CARE EDUCATION/TRAINING PROGRAM

## 2021-02-28 PROCEDURE — 214N000001 HC R&B CCU UMMC

## 2021-02-28 RX ORDER — HYDRALAZINE HYDROCHLORIDE 100 MG/1
100 TABLET, FILM COATED ORAL EVERY 8 HOURS SCHEDULED
Status: DISCONTINUED | OUTPATIENT
Start: 2021-02-28 | End: 2021-03-03

## 2021-02-28 RX ORDER — WARFARIN SODIUM 1 MG/1
2 TABLET ORAL
Status: COMPLETED | OUTPATIENT
Start: 2021-02-28 | End: 2021-02-28

## 2021-02-28 RX ORDER — METHOCARBAMOL 500 MG/1
500 TABLET, FILM COATED ORAL 3 TIMES DAILY PRN
Status: DISCONTINUED | OUTPATIENT
Start: 2021-02-28 | End: 2021-03-17 | Stop reason: HOSPADM

## 2021-02-28 RX ADMIN — HYDRALAZINE HYDROCHLORIDE 75 MG: 25 TABLET, FILM COATED ORAL at 06:48

## 2021-02-28 RX ADMIN — ASPIRIN 81 MG CHEWABLE TABLET 81 MG: 81 TABLET CHEWABLE at 08:05

## 2021-02-28 RX ADMIN — FINASTERIDE 5 MG: 5 TABLET, FILM COATED ORAL at 08:05

## 2021-02-28 RX ADMIN — SODIUM CHLORIDE 300 ML: 9 INJECTION, SOLUTION INTRAVENOUS at 11:57

## 2021-02-28 RX ADMIN — HYDRALAZINE HYDROCHLORIDE 100 MG: 100 TABLET, FILM COATED ORAL at 15:27

## 2021-02-28 RX ADMIN — TAMSULOSIN HYDROCHLORIDE 0.4 MG: 0.4 CAPSULE ORAL at 08:05

## 2021-02-28 RX ADMIN — OXYMETAZOLINE HYDROCHLORIDE 2 SPRAY: 0.05 SPRAY NASAL at 09:36

## 2021-02-28 RX ADMIN — DOBUTAMINE HYDROCHLORIDE 3 MCG/KG/MIN: 200 INJECTION INTRAVENOUS at 00:53

## 2021-02-28 RX ADMIN — Medication 1 CAPSULE: at 08:04

## 2021-02-28 RX ADMIN — HYDRALAZINE HYDROCHLORIDE 100 MG: 100 TABLET, FILM COATED ORAL at 21:09

## 2021-02-28 RX ADMIN — MELATONIN TAB 3 MG 3 MG: 3 TAB at 21:09

## 2021-02-28 RX ADMIN — ALLOPURINOL 100 MG: 100 TABLET ORAL at 08:05

## 2021-02-28 RX ADMIN — Medication: at 11:58

## 2021-02-28 RX ADMIN — FLUOXETINE 30 MG: 20 CAPSULE ORAL at 08:04

## 2021-02-28 RX ADMIN — SODIUM CHLORIDE 250 ML: 9 INJECTION, SOLUTION INTRAVENOUS at 11:58

## 2021-02-28 RX ADMIN — METHOCARBAMOL 500 MG: 500 TABLET, FILM COATED ORAL at 19:47

## 2021-02-28 RX ADMIN — ZOLPIDEM TARTRATE 5 MG: 5 TABLET, FILM COATED ORAL at 21:09

## 2021-02-28 RX ADMIN — DOCUSATE SODIUM 50 MG AND SENNOSIDES 8.6 MG 2 TABLET: 8.6; 5 TABLET, FILM COATED ORAL at 19:47

## 2021-02-28 RX ADMIN — WARFARIN SODIUM 2 MG: 1 TABLET ORAL at 18:21

## 2021-02-28 RX ADMIN — CEFAZOLIN SODIUM 2 G: 2 INJECTION, SOLUTION INTRAVENOUS at 19:43

## 2021-02-28 RX ADMIN — OMEPRAZOLE 20 MG: 20 CAPSULE, DELAYED RELEASE ORAL at 08:05

## 2021-02-28 RX ADMIN — AMIODARONE HYDROCHLORIDE 200 MG: 200 TABLET ORAL at 08:05

## 2021-02-28 ASSESSMENT — ACTIVITIES OF DAILY LIVING (ADL)
ADLS_ACUITY_SCORE: 16
ADLS_ACUITY_SCORE: 16
ADLS_ACUITY_SCORE: 15
ADLS_ACUITY_SCORE: 16
ADLS_ACUITY_SCORE: 15

## 2021-02-28 NOTE — PROGRESS NOTES
.    Nephrology Progress Note  02/28/2021         Assessment & Recommendations:   64 yo M with PMHx  NICM  s/p HM III , VT, severe MR, atrial fibrillation on warfarin, hypertension, CKD IV, DMII, HIT presented to OSH with fevers and cough in the setting of syncopal episode transferred to [facility] for further cares. Hospitalization complicated by Afib with RVR with hypotension and intubation with upgrade of cares to the ICU. Found to be in septic shock 2/2 legionella pneumonia with possible cardiogenic shock. Nephrology was consulted for evaluation and management of anuric BERNARDA. Started on RRT 2/16.     Oligoanuric BERNARDA 2/2 ischemic ATN  Baseline Cr last yr s/p LVAD placement was around 1. On admission ~2.3 and doubled within 24 hours with rapid decrease in UOP. UA unimpressive with baseline proteinuria and new granular casts. Initiated on RRT 2/16. Monitoring for renal recovery and now having improved UOP, but lagging with clearance. With nosebleeds the last 2 days, will dialyze today in case there is any mild uremic platelet dysfunction contributing to epistaxis.  - HD today, 3h, goal UF 1L after discussing with Cardiology  - Agree with bumex   - Avoid nephrotoxins as able  - Renally dose meds    Volume status:   Euvolemic to slightly hypervolemic.     Electrolytes - stable   Acid/base - stable   Anemia - Hb 6.7 this AM, received 1u pRBCs. Had intermittent epistaxis for last 2 days.     Recommendations were communicated to primary team via phone     Seen and discussed with Dr. Miriam Ag MD  [phone]    Interval History :   Nursing and provider notes from last 24 hours reviewed.    Epistaxis on & off overnight. Didn't sleep too well. Denies SOB, chest pain. Agreeable to dialysis again today.       Review of Systems:   4pt ROS negative except as above in HPI    Physical Exam:   I/O last 3 completed shifts:  In: 715 [P.O.:615; I.V.:100]  Out: 1800 [Urine:1800]   /80   Pulse 78   Temp 98.3  F (36.8  C)  (Oral)   Resp 23   Wt 99.7 kg (219 lb 11.2 oz)   SpO2 96%   BMI 34.41 kg/m       General : Pt awake, not in acute distress   Lungs : anterior lung fields are clear  Cardiac : LVAD in place  Abdomen : Soft/ND/NT  LE : trace edema noted  Dialysis Access : tunneled RIJ catheter    Labs:   All labs reviewed by me  Electrolytes/Renal -   Recent Labs   Lab Test 02/28/21 0517 02/27/21  1700 02/27/21  0415 02/22/21  1539 02/22/21  1539 02/22/21  0353 02/22/21  0353 02/21/21  1617 02/21/21  1617   * 126* 128*   < >  --    < > 136   < >  --    POTASSIUM 4.6 4.6 4.5   < >  --    < > 4.7   < >  --    CHLORIDE 94 92* 94   < >  --    < > 106   < >  --    CO2 22 21 21   < >  --    < > 24   < >  --    BUN 84* 78* 71*   < >  --    < > 30   < >  --    CR 7.07* 6.69* 6.32*   < >  --    < > 1.94*   < >  --    * 155* 128*   < >  --    < > 109*   < >  --    CALOS 7.8* 7.7* 7.6*   < >  --    < > 8.1*   < >  --    MAG  --   --   --   --  2.6*  --  2.5*  --  2.6*   PHOS  --   --   --   --  5.2*  --  4.4  --  4.5    < > = values in this interval not displayed.       CBC -   Recent Labs   Lab Test 02/28/21 0517 02/27/21 0415 02/26/21  1724 02/26/21 0424   WBC 10.0 9.8  --  9.1   HGB 6.7* 7.9* 8.4* 7.0*    245  --  228       LFTs -   Recent Labs   Lab Test 02/28/21 0517 02/27/21  1700 02/27/21 0415   ALKPHOS 138 146 148   BILITOTAL 1.4* 0.4 0.5   ALT 48 53 53   AST 66* 76* 80*   PROTTOTAL 7.0 7.1 6.4*   ALBUMIN 2.6* 2.6* 2.6*       Iron Panel -   Recent Labs   Lab Test 02/27/21  0415 11/16/20  0616 02/08/20  0408   IRON 28* 16* 25*   IRONSAT 9* 6* 8*   HEAVENLY 276 75 189         Imaging:    Current Medications:    sodium chloride (PF) 0.9%  3 mL Intracatheter During Hemodialysis (from stock)     sodium chloride (PF) 0.9%  3 mL Intracatheter During Hemodialysis (from stock)     allopurinol  100 mg Oral or Feeding Tube Daily     amiodarone  200 mg Oral Daily     aspirin  81 mg Oral Daily     ceFAZolin  2 g Intravenous  Daily     finasteride  5 mg Oral Daily     FLUoxetine  30 mg Oral Daily     gelatin absorbable  1 each Topical During Hemodialysis (from stock)     hydrALAZINE  100 mg Oral or Feeding Tube Q8H BRITTANI     insulin aspart  1-7 Units Subcutaneous TID AC     insulin aspart  1-5 Units Subcutaneous At Bedtime     melatonin  3 mg Oral At Bedtime     multivitamin RENAL  1 capsule Oral Daily     omeprazole  20 mg Oral QAM AC     oxymetazoline  2 spray Both Nostrils BID     senna-docusate  2 tablet Oral BID     sodium chloride (PF)  9 mL Intracatheter During Hemodialysis (from stock)     sodium chloride (PF)  9 mL Intracatheter During Hemodialysis (from stock)     tamsulosin  0.4 mg Oral Daily       dextrose       dextrose Stopped (02/17/21 1600)     DOBUTamine 3 mcg/kg/min (02/28/21 0933)     Warfarin Therapy Reminder       Wendy Ag MD   Patient was seen and evaluated by me, Yosvany Pineda MD. I have reviewed the note and agree with the the plan of care as documented by the fellow.

## 2021-02-28 NOTE — PLAN OF CARE
D: Admitted 2/15/2021 with mixed cardiogenic and distributive shock with intermittent wide-complex tachycardia c/b hypoxemia requiring intubation, BERNARDA, and legionella pneumonia. PMHx relevant for NICM (LVEF 15-20%) s/p HM3 (02/2020) and ICD (03/2020), MSSA driveline infection and bacteremia, VT on amiodarone, nonobstructive CAD, severe MR, atrial fibrillation (AC warfarin), HTN, ABHINAV on home CPAP, CKD IV, DMII, HIT.     I: Monitored vitals and assessed pt status.   Changed:  -Urine output this shift 975 mL. Creatinine continues to rise this AM.  -Nose bleed persisting intermittently throughout shift at slow drip - cross cover notified. Managed with gauze packing, pressure and ice packs.   -Hgb 6.7 this AM. 1 unit pRBCs ordered.   Running:  -Dobutamine at 3 mcg/kg/min  PRN:  -Ambien at HS       A: A0x4. VSS, on room air/home CPAP. HR 90s-100s on monitor. Rhythm ST with BBB. HM3 LVAD without alarms and numbers within parameters over night. Afebrile. Denies chest pain, palpitations, SOB. Denies nausea this shift. PIV saline locked. Double lumen PICC in RUE. Right internal jugular dialysis line CDI. No BM this shift. Blood sugar checks ACHS/0200. Oliguric on HD (550 out this shift). Appeared to sleep well between cares. Up with assist of 1.      BP 92/84 (BP Location: Left arm)   Pulse 100   Temp 97.5  F (36.4  C) (Oral)   Resp 16   Wt 99.7 kg (219 lb 11.2 oz)   SpO2 96%   BMI 34.41 kg/m         P: Continue to monitor vitals, hgb, and kidney function closely. Anticipate possible dialysis run today. Update cards 2 team with changes/concerns.

## 2021-02-28 NOTE — PROGRESS NOTES
HEMODIALYSIS TREATMENT NOTE    Date: 2/28/2021  Time: 2:50 PM    Data:  Pre Wt: 99.7 kg (219 lb 12.8 oz)   Desired Wt: 98.7 kg  Post Wt: 98.7 kg (217 lb 9.5 oz)  Weight change: 1 kg  Ultrafiltration - Post Run Net Total Removed (mL): 1000 mL  Vascular Access Status: CVC (right tunneled) / patent  Dialyzer Rinse: Streaked  Total Blood Volume Processed: 66.32 L   Total Dialysis (Treatment) Time: 3 hours  Dialysate Bath: K 2, Ca 3  Heparin: None    Lab:   Yes - Hep B Surface Antigen & Antibody (per protocol)    Interventions:  Treatment time increased to 3 hours from 2.5 and UF goal increased to 1 L net mid-run per Dr. Ag after discussion with cardiology. BG checked at noon.    Assessment:  HD for BERNARDA patient with LVAD. Alert & oriented x 4 and able to make needs known. Denied any pain. VS and LVAD numbers WNL throughout. Slept on and off. Denied any pain. HR came down from 90s/low 100s to 70s/80s by end of run with Critline maxed out at -2% suggesting patient may benefit from additional fluid removal.      Plan:    Per renal.

## 2021-02-28 NOTE — PLAN OF CARE
PT / 6C - Cxl. Pt OOR at dialysis at scheduled AM PT time and at time of second attempt. Rescheduled.

## 2021-03-01 ENCOUNTER — APPOINTMENT (OUTPATIENT)
Dept: OCCUPATIONAL THERAPY | Facility: CLINIC | Age: 66
DRG: 870 | End: 2021-03-01
Attending: INTERNAL MEDICINE
Payer: MEDICARE

## 2021-03-01 ENCOUNTER — APPOINTMENT (OUTPATIENT)
Dept: PHYSICAL THERAPY | Facility: CLINIC | Age: 66
DRG: 870 | End: 2021-03-01
Attending: INTERNAL MEDICINE
Payer: MEDICARE

## 2021-03-01 LAB
ABO + RH BLD: NORMAL
ABO + RH BLD: NORMAL
ALBUMIN SERPL-MCNC: 2.5 G/DL (ref 3.4–5)
ALBUMIN SERPL-MCNC: 2.6 G/DL (ref 3.4–5)
ALP SERPL-CCNC: 128 U/L (ref 40–150)
ALP SERPL-CCNC: 151 U/L (ref 40–150)
ALT SERPL W P-5'-P-CCNC: 38 U/L (ref 0–70)
ALT SERPL W P-5'-P-CCNC: 43 U/L (ref 0–70)
ANION GAP SERPL CALCULATED.3IONS-SCNC: 10 MMOL/L (ref 3–14)
ANION GAP SERPL CALCULATED.3IONS-SCNC: 10 MMOL/L (ref 3–14)
APTT PPP: 39 SEC (ref 22–37)
AST SERPL W P-5'-P-CCNC: 59 U/L (ref 0–45)
AST SERPL W P-5'-P-CCNC: 66 U/L (ref 0–45)
BASOPHILS # BLD AUTO: 0 10E9/L (ref 0–0.2)
BASOPHILS NFR BLD AUTO: 0.5 %
BILIRUB SERPL-MCNC: 0.5 MG/DL (ref 0.2–1.3)
BILIRUB SERPL-MCNC: 1.4 MG/DL (ref 0.2–1.3)
BLD GP AB SCN SERPL QL: NORMAL
BLD PROD TYP BPU: NORMAL
BLOOD BANK CMNT PATIENT-IMP: NORMAL
BUN SERPL-MCNC: 56 MG/DL (ref 7–30)
BUN SERPL-MCNC: 68 MG/DL (ref 7–30)
CALCIUM SERPL-MCNC: 7.9 MG/DL (ref 8.5–10.1)
CALCIUM SERPL-MCNC: 8 MG/DL (ref 8.5–10.1)
CHLORIDE SERPL-SCNC: 94 MMOL/L (ref 94–109)
CHLORIDE SERPL-SCNC: 97 MMOL/L (ref 94–109)
CO2 SERPL-SCNC: 23 MMOL/L (ref 20–32)
CO2 SERPL-SCNC: 25 MMOL/L (ref 20–32)
CREAT SERPL-MCNC: 5.13 MG/DL (ref 0.66–1.25)
CREAT SERPL-MCNC: 5.86 MG/DL (ref 0.66–1.25)
DIFFERENTIAL METHOD BLD: ABNORMAL
EOSINOPHIL # BLD AUTO: 0.1 10E9/L (ref 0–0.7)
EOSINOPHIL NFR BLD AUTO: 0.9 %
EPO SERPL-ACNC: 10 MU/ML (ref 4–27)
ERYTHROCYTE [DISTWIDTH] IN BLOOD BY AUTOMATED COUNT: 22.9 % (ref 10–15)
GFR SERPL CREATININE-BSD FRML MDRD: 11 ML/MIN/{1.73_M2}
GFR SERPL CREATININE-BSD FRML MDRD: 9 ML/MIN/{1.73_M2}
GLUCOSE BLDC GLUCOMTR-MCNC: 101 MG/DL (ref 70–99)
GLUCOSE BLDC GLUCOMTR-MCNC: 138 MG/DL (ref 70–99)
GLUCOSE BLDC GLUCOMTR-MCNC: 147 MG/DL (ref 70–99)
GLUCOSE BLDC GLUCOMTR-MCNC: 190 MG/DL (ref 70–99)
GLUCOSE SERPL-MCNC: 104 MG/DL (ref 70–99)
GLUCOSE SERPL-MCNC: 180 MG/DL (ref 70–99)
HBV SURFACE AB SERPL IA-ACNC: 0 M[IU]/ML
HBV SURFACE AG SERPL QL IA: NONREACTIVE
HCT VFR BLD AUTO: 25.8 % (ref 40–53)
HGB BLD-MCNC: 8.1 G/DL (ref 13.3–17.7)
IMM GRANULOCYTES # BLD: 0.1 10E9/L (ref 0–0.4)
IMM GRANULOCYTES NFR BLD: 1.2 %
INR PPP: 2.08 (ref 0.86–1.14)
INTERPRETATION ECG - MUSE: NORMAL
LDH SERPL L TO P-CCNC: 304 U/L (ref 85–227)
LYMPHOCYTES # BLD AUTO: 0.7 10E9/L (ref 0.8–5.3)
LYMPHOCYTES NFR BLD AUTO: 9.1 %
MCH RBC QN AUTO: 25.2 PG (ref 26.5–33)
MCHC RBC AUTO-ENTMCNC: 31.4 G/DL (ref 31.5–36.5)
MCV RBC AUTO: 80 FL (ref 78–100)
MONOCYTES # BLD AUTO: 1 10E9/L (ref 0–1.3)
MONOCYTES NFR BLD AUTO: 12.9 %
NEUTROPHILS # BLD AUTO: 5.8 10E9/L (ref 1.6–8.3)
NEUTROPHILS NFR BLD AUTO: 75.4 %
NRBC # BLD AUTO: 0 10*3/UL
NRBC BLD AUTO-RTO: 0 /100
NUM BPU REQUESTED: 1
PLATELET # BLD AUTO: 226 10E9/L (ref 150–450)
POTASSIUM SERPL-SCNC: 4 MMOL/L (ref 3.4–5.3)
POTASSIUM SERPL-SCNC: 4.2 MMOL/L (ref 3.4–5.3)
PROT SERPL-MCNC: 6.4 G/DL (ref 6.8–8.8)
PROT SERPL-MCNC: 7.2 G/DL (ref 6.8–8.8)
RBC # BLD AUTO: 3.21 10E12/L (ref 4.4–5.9)
SODIUM SERPL-SCNC: 127 MMOL/L (ref 133–144)
SODIUM SERPL-SCNC: 132 MMOL/L (ref 133–144)
SPECIMEN EXP DATE BLD: NORMAL
WBC # BLD AUTO: 7.7 10E9/L (ref 4–11)

## 2021-03-01 PROCEDURE — 250N000013 HC RX MED GY IP 250 OP 250 PS 637: Performed by: INTERNAL MEDICINE

## 2021-03-01 PROCEDURE — 97116 GAIT TRAINING THERAPY: CPT | Mod: GP

## 2021-03-01 PROCEDURE — 85025 COMPLETE CBC W/AUTO DIFF WBC: CPT | Performed by: STUDENT IN AN ORGANIZED HEALTH CARE EDUCATION/TRAINING PROGRAM

## 2021-03-01 PROCEDURE — 85730 THROMBOPLASTIN TIME PARTIAL: CPT | Performed by: STUDENT IN AN ORGANIZED HEALTH CARE EDUCATION/TRAINING PROGRAM

## 2021-03-01 PROCEDURE — 214N000001 HC R&B CCU UMMC

## 2021-03-01 PROCEDURE — 250N000013 HC RX MED GY IP 250 OP 250 PS 637

## 2021-03-01 PROCEDURE — 97530 THERAPEUTIC ACTIVITIES: CPT | Mod: GP

## 2021-03-01 PROCEDURE — 99232 SBSQ HOSP IP/OBS MODERATE 35: CPT | Mod: 25 | Performed by: INTERNAL MEDICINE

## 2021-03-01 PROCEDURE — 250N000013 HC RX MED GY IP 250 OP 250 PS 637: Performed by: STUDENT IN AN ORGANIZED HEALTH CARE EDUCATION/TRAINING PROGRAM

## 2021-03-01 PROCEDURE — 80053 COMPREHEN METABOLIC PANEL: CPT | Performed by: STUDENT IN AN ORGANIZED HEALTH CARE EDUCATION/TRAINING PROGRAM

## 2021-03-01 PROCEDURE — 80053 COMPREHEN METABOLIC PANEL: CPT | Performed by: INTERNAL MEDICINE

## 2021-03-01 PROCEDURE — 97110 THERAPEUTIC EXERCISES: CPT | Mod: GO | Performed by: OCCUPATIONAL THERAPIST

## 2021-03-01 PROCEDURE — 250N000011 HC RX IP 250 OP 636: Performed by: STUDENT IN AN ORGANIZED HEALTH CARE EDUCATION/TRAINING PROGRAM

## 2021-03-01 PROCEDURE — 99233 SBSQ HOSP IP/OBS HIGH 50: CPT | Mod: GC | Performed by: INTERNAL MEDICINE

## 2021-03-01 PROCEDURE — 97535 SELF CARE MNGMENT TRAINING: CPT | Mod: GO | Performed by: OCCUPATIONAL THERAPIST

## 2021-03-01 PROCEDURE — 93750 INTERROGATION VAD IN PERSON: CPT | Performed by: INTERNAL MEDICINE

## 2021-03-01 PROCEDURE — 83615 LACTATE (LD) (LDH) ENZYME: CPT | Performed by: STUDENT IN AN ORGANIZED HEALTH CARE EDUCATION/TRAINING PROGRAM

## 2021-03-01 PROCEDURE — 999N000157 HC STATISTIC RCP TIME EA 10 MIN

## 2021-03-01 PROCEDURE — 85610 PROTHROMBIN TIME: CPT | Performed by: STUDENT IN AN ORGANIZED HEALTH CARE EDUCATION/TRAINING PROGRAM

## 2021-03-01 PROCEDURE — 999N001017 HC STATISTIC GLUCOSE BY METER IP

## 2021-03-01 RX ORDER — CEPHALEXIN 500 MG/1
500 CAPSULE ORAL EVERY 12 HOURS SCHEDULED
Status: COMPLETED | OUTPATIENT
Start: 2021-03-02 | End: 2021-03-15

## 2021-03-01 RX ORDER — CEPHALEXIN 500 MG/1
500 CAPSULE ORAL EVERY 12 HOURS SCHEDULED
Status: DISCONTINUED | OUTPATIENT
Start: 2021-03-02 | End: 2021-03-01

## 2021-03-01 RX ORDER — WARFARIN SODIUM 5 MG/1
5 TABLET ORAL
Status: COMPLETED | OUTPATIENT
Start: 2021-03-01 | End: 2021-03-01

## 2021-03-01 RX ORDER — LANOLIN ALCOHOL/MO/W.PET/CERES
3 CREAM (GRAM) TOPICAL
Status: DISCONTINUED | OUTPATIENT
Start: 2021-03-01 | End: 2021-03-01

## 2021-03-01 RX ORDER — BUMETANIDE 2 MG/1
4 TABLET ORAL DAILY
Status: DISCONTINUED | OUTPATIENT
Start: 2021-03-01 | End: 2021-03-14

## 2021-03-01 RX ORDER — BUMETANIDE 0.25 MG/ML
4 INJECTION INTRAMUSCULAR; INTRAVENOUS ONCE
Status: COMPLETED | OUTPATIENT
Start: 2021-03-01 | End: 2021-03-01

## 2021-03-01 RX ORDER — CEPHALEXIN 500 MG/1
500 CAPSULE ORAL EVERY 6 HOURS SCHEDULED
Status: DISCONTINUED | OUTPATIENT
Start: 2021-03-02 | End: 2021-03-01

## 2021-03-01 RX ORDER — OXYMETAZOLINE HYDROCHLORIDE 0.05 G/100ML
2 SPRAY NASAL 2 TIMES DAILY
Status: DISCONTINUED | OUTPATIENT
Start: 2021-03-01 | End: 2021-03-09

## 2021-03-01 RX ADMIN — ZOLPIDEM TARTRATE 5 MG: 5 TABLET, FILM COATED ORAL at 20:10

## 2021-03-01 RX ADMIN — MELATONIN TAB 3 MG 3 MG: 3 TAB at 20:09

## 2021-03-01 RX ADMIN — INSULIN ASPART 2 UNITS: 100 INJECTION, SOLUTION INTRAVENOUS; SUBCUTANEOUS at 16:22

## 2021-03-01 RX ADMIN — HYDRALAZINE HYDROCHLORIDE 100 MG: 100 TABLET, FILM COATED ORAL at 14:14

## 2021-03-01 RX ADMIN — DOBUTAMINE HYDROCHLORIDE 3 MCG/KG/MIN: 200 INJECTION INTRAVENOUS at 03:49

## 2021-03-01 RX ADMIN — FLUOXETINE 30 MG: 20 CAPSULE ORAL at 07:52

## 2021-03-01 RX ADMIN — TAMSULOSIN HYDROCHLORIDE 0.4 MG: 0.4 CAPSULE ORAL at 07:51

## 2021-03-01 RX ADMIN — HYDRALAZINE HYDROCHLORIDE 100 MG: 100 TABLET, FILM COATED ORAL at 21:18

## 2021-03-01 RX ADMIN — Medication 1 CAPSULE: at 07:53

## 2021-03-01 RX ADMIN — DOCUSATE SODIUM 50 MG AND SENNOSIDES 8.6 MG 1 TABLET: 8.6; 5 TABLET, FILM COATED ORAL at 20:15

## 2021-03-01 RX ADMIN — AMIODARONE HYDROCHLORIDE 200 MG: 200 TABLET ORAL at 07:52

## 2021-03-01 RX ADMIN — CEFAZOLIN SODIUM 2 G: 2 INJECTION, SOLUTION INTRAVENOUS at 20:09

## 2021-03-01 RX ADMIN — HYDRALAZINE HYDROCHLORIDE 100 MG: 100 TABLET, FILM COATED ORAL at 05:41

## 2021-03-01 RX ADMIN — FINASTERIDE 5 MG: 5 TABLET, FILM COATED ORAL at 07:53

## 2021-03-01 RX ADMIN — BUMETANIDE 4 MG: 2 TABLET ORAL at 08:56

## 2021-03-01 RX ADMIN — BUMETANIDE 4 MG: 0.25 INJECTION INTRAMUSCULAR; INTRAVENOUS at 16:14

## 2021-03-01 RX ADMIN — ASPIRIN 81 MG CHEWABLE TABLET 81 MG: 81 TABLET CHEWABLE at 07:51

## 2021-03-01 RX ADMIN — OMEPRAZOLE 20 MG: 20 CAPSULE, DELAYED RELEASE ORAL at 07:51

## 2021-03-01 RX ADMIN — WARFARIN SODIUM 5 MG: 5 TABLET ORAL at 17:46

## 2021-03-01 RX ADMIN — METHOCARBAMOL 500 MG: 500 TABLET, FILM COATED ORAL at 20:10

## 2021-03-01 RX ADMIN — ALLOPURINOL 100 MG: 100 TABLET ORAL at 07:52

## 2021-03-01 ASSESSMENT — ACTIVITIES OF DAILY LIVING (ADL)
ADLS_ACUITY_SCORE: 15
ADLS_ACUITY_SCORE: 17
ADLS_ACUITY_SCORE: 15
ADLS_ACUITY_SCORE: 17
ADLS_ACUITY_SCORE: 17
ADLS_ACUITY_SCORE: 15

## 2021-03-01 NOTE — PROGRESS NOTES
.    Nephrology Progress Note  03/01/2021         Assessment & Recommendations:   64 yo M with PMHx  NICM  s/p HM III , VT, severe MR, atrial fibrillation on warfarin, hypertension, CKD IV, DMII, HIT presented to OSH with fevers and cough in the setting of syncopal episode transferred to [facility] for further cares. Hospitalization complicated by Afib with RVR with hypotension and intubation with upgrade of cares to the ICU. Found to be in septic shock 2/2 legionella pneumonia with possible cardiogenic shock. Nephrology was consulted for evaluation and management of anuric BERNARDA. Started on RRT 2/16.     Oligoanuric BERNARDA 2/2 ischemic ATN  Baseline Cr last yr s/p LVAD placement was around 1. On admission ~2.3 and doubled within 24 hours with rapid decrease in UOP. UA unimpressive with baseline proteinuria and new granular casts. Initiated on RRT 2/16. Monitoring for renal recovery and now having improved UOP, but lagging with clearance. With nosebleeds the last few days with concern for mild uremic platelet dysfunction contributing to epistaxis.  - No HD today will be monitoring cr and UOP. No scheduled HD at this time.  - Agree with bumex   - Avoid nephrotoxins as able  - Renally dose meds  - Monitor I/Os    Volume status:   Euvolemic to slight hypervolemia     Electrolytes - stable   Acid/base - stable   Anemia - Hb 8.1 this AM, received 1u pRBCs on 2/28. Had intermittent epistaxis for last few days.     Recommendations were communicated to primary team via this note     Seen and discussed with Dr. Hunter Barry MD  [phone]  I have seen and examined the patient and reviewed all current labs and findings. I concur with the assessment and plan as it is outlined. Any changes to the note made by me are in bold. Char Harrison MD MS FNKF    Interval History :   Nursing and provider notes from last 24 hours reviewed.    Ongoing intermittent Epistaxis. Had HD yesterday and tolerated well. Tolerating oral intake.  Denies SOB, chest pain.    Urine output improving - 1L the last 24 hrs      Review of Systems:   4pt ROS negative except as above in HPI    Physical Exam:   I/O last 3 completed shifts:  In: 1661.5 [P.O.:1020; I.V.:307.75]  Out: 1350 [Urine:350; Other:1000]   BP 98/82 (BP Location: Left arm)   Pulse 105   Temp 98.3  F (36.8  C) (Oral)   Resp 18   Wt 99.2 kg (218 lb 11.1 oz)   SpO2 98%   BMI 34.25 kg/m       General : Pt awake, not in acute distress   Lungs : No increased WOB. Anterior lung fields are clear  Cardiac : LVAD in place  Abdomen : Soft/ND/NT  LE : trace edema noted  Dialysis Access : tunneled RIJ catheter    Labs:   All labs reviewed by me  Electrolytes/Renal -   Recent Labs   Lab Test 03/01/21  0545 02/28/21  1840 02/28/21  0517 02/22/21  1539 02/22/21  1539 02/22/21  0353 02/22/21  0353 02/21/21  1617 02/21/21  1617   * 130* 128*   < >  --    < > 136   < >  --    POTASSIUM 4.0 3.8 4.6   < >  --    < > 4.7   < >  --    CHLORIDE 97 95 94   < >  --    < > 106   < >  --    CO2 25 25 22   < >  --    < > 24   < >  --    BUN 56* 49* 84*   < >  --    < > 30   < >  --    CR 5.13* 4.44* 7.07*   < >  --    < > 1.94*   < >  --    * 186* 127*   < >  --    < > 109*   < >  --    CALOS 8.0* 8.1* 7.8*   < >  --    < > 8.1*   < >  --    MAG  --   --   --   --  2.6*  --  2.5*  --  2.6*   PHOS  --   --   --   --  5.2*  --  4.4  --  4.5    < > = values in this interval not displayed.       CBC -   Recent Labs   Lab Test 03/01/21  0545 02/28/21  0517 02/27/21  0415   WBC 7.7 10.0 9.8   HGB 8.1* 6.7* 7.9*    261 245       LFTs -   Recent Labs   Lab Test 03/01/21  0545 02/28/21  1840 02/28/21  0517   ALKPHOS 128 150 138   BILITOTAL 1.4* 0.5 1.4*   ALT 38 46 48   AST 59* 62* 66*   PROTTOTAL 6.4* 7.2 7.0   ALBUMIN 2.5* 2.7* 2.6*       Iron Panel -   Recent Labs   Lab Test 02/27/21  0415 11/16/20  0616 02/08/20  0408   IRON 28* 16* 25*   IRONSAT 9* 6* 8*   HEAVENLY 276 75 189         Imaging:    Current  Medications:    allopurinol  100 mg Oral or Feeding Tube Daily     amiodarone  200 mg Oral Daily     aspirin  81 mg Oral Daily     bumetanide  4 mg Oral Daily     ceFAZolin  2 g Intravenous Daily     [START ON 3/2/2021] cephALEXin  500 mg Oral Q12H BRITTANI     finasteride  5 mg Oral Daily     FLUoxetine  30 mg Oral Daily     hydrALAZINE  100 mg Oral or Feeding Tube Q8H BRITTANI     insulin aspart  1-7 Units Subcutaneous TID AC     insulin aspart  1-5 Units Subcutaneous At Bedtime     melatonin  3 mg Oral At Bedtime     multivitamin RENAL  1 capsule Oral Daily     omeprazole  20 mg Oral QAM AC     senna-docusate  2 tablet Oral BID     tamsulosin  0.4 mg Oral Daily     warfarin ANTICOAGULANT  5 mg Oral ONCE at 18:00       dextrose       dextrose Stopped (02/17/21 1600)     DOBUTamine 3 mcg/kg/min (03/01/21 0755)     Warfarin Therapy Reminder       Jese Barry MD

## 2021-03-01 NOTE — PROGRESS NOTES
LVAD Social Work Services Progress Note      Date of Initial Social Work Evaluation: 2/16/2021  Collaborated with: [facility] (Kay [phone]), [facility] (Manager: Johana [phone]), [facility] ([phone]), [facility] ([facility] Yancy [phone])    Data: Pt with hx of LVAD implant 2/18/2020. Pt admitted 2/16/2021 from OSH for cardiogenic and septic shock and multiorgan failure. Pt was extubated 2/20. Pt had tunneled catheter placed on 2/23.   Social Work attempting to find outpatient dialysis center for pt which is complicated by LVAD. There are currently no LVAD trained dialysis centers near pt's home.      Intervention: Discharge Planning   Assessment: Continuing to work at identifying an outpatient dialysis center. [facility] following, but unable to accept until outpatient dialysis is confirmed. Discussed with Cards 2 and pt not yet medically ready.   Education provided by SW: Discharge Planning   Plan:    Discharge Plans in Progress: The following outpatient dialysis referrals have been initiated:    -[facility]: [facility] declined today as pt would be too far from their location (~2 1/2hrs). [facility] Int[facility] looking into possibly placement in [place], [place], which is 1hr 40 minutes from his home. Writer does anticipate a global denial from [facility] due to distance. Updated clinicals sent today.    -[facility] Int[facility]: Case has been escalated due to LVAD. Intake requested additional information that was sent today. Preferred facility is [place].     -[facility]: Message left for charge RN to discuss referral.    -[facility] and [place] Locations: Referral sent last week. Facility has already indicated that the earliest they would be ready would be April as facility needs to obtain LVAD training. Discussed w/ LVAD coordinator today and will be reaching out to [place] to answer questions regarding training.         Barriers to d/c plan:  Locating Outpatient Dialysis Placement    Follow up Plan: SW to continue to follow for discharge planning and support during prolonged hospitalization.

## 2021-03-01 NOTE — PROGRESS NOTES
[facility]    Cardiology Progress Note- Cards 2        Date of Admission:  2/15/2021     Assessment & Plan: SL   Eliseo Tanner is a 65 year old male with PMHx relevant for NICM with LVEF 15-20%, NYHA III s/p HM III 2/18/2020 (post op complicated by retrosternal hematoma with bleeding in the lungs), s/p ICD placed on 3/16/2020, h/o MSSA driveline infection and bacteremia 11/5/2020 on prophylactic cephalexin, h/o VT on amiodarone, moderate nonobstructive CAD, severe MR, atrial fibrillation on warfarin, hypertension, ABHINAV requiring CPAP, CKD IV, DMII, HLP,  h/o HIT who presented to the Cardiovascular Unit (4E Cardiac ICU) with mixed cardiogenic and distributive shock withan intermittent wide complex tachycardia. Initially requiring vasopressors and inotropes, intubated for hypoxemia. Treated for legionella pneumonia. Stabilized on low dose dobutamine off of pressors. Extubated. With persistent oliguric renal failure.     Today's Plan  - Continue low dose dobutamine  - Dialysis today - goal net negative 1-2L   - Increase hydralazine to 100mg tid  - Continue antibiotics :              -Cefazolin (2/18/21 - 3/1/21) followed by PO cefalexin               Azithromycin (2/15/21 - 2/25/21)               Cefepime (2/16/21 - 2/18/21) (deescalated to cefazolin)               Vancomycin (2/15/21 - 2/18/21) (MRSA nares negative)               Piperacillin tazobactam (2/15/21-2/16/21) (switched for renal protection)  - ongoing PT/OT  - continue warfarin  - Social work arranging dialysis and placement    Cardiovascular:     #Mixed Cardiogenic and Septic Shock - Improved  #Multiorgan Failure - Improved  Presented from [facility] with subacute development of shortness of breath, dyspnea on exertion, dizziness/lightheadedness with near syncopal event found to be febrile with intermittent wide complex tachycardia. Recent COVID19 moderna vaccination. CT chest  showing bilateral infiltrates. Community acquired pneumonia vs. Possible recurrent of MSSA bacteremia WBC 24.5, Procal 7.95, , Lactacte 6.9.     Mildly elevated filling pressures on presentation CVP 18, PA 35/20, PCWP 11. Likely some component of cardiogenic shock. LVAD parameters relatively stable despite markedly elevated LDH 8,531. Euvolemic on exam. Worsening renal function, Cr 4.62, up-trending LFT's/ INR. Legionella antigen positive. Weaned off of vasopressor and maintained on dobutamine. Acute hepatic injury which is improving. Requiring dialysis. Shock resolved.      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 Bhatti (ID clinic)   # Troponin elevation (likely demand ischemia)  # Essential Hypertension  # Hyperlipidemia        Patient with long standing history of NICM previously implanted LVAD (HM III) on 02/18/20 due to worsening functional status and systolic ventricular dysfunction (further complicated by RV dysfunction by VAD hemodynamic compensatory mechanism, VT in ICU now on Amiodarone and Atrial Fibrillation with AVR requiring DCCV on 02/28/20).      Elevated cardiac biomarkers on presentation, SGC with only mildly elevated filling pressures. Euvolemic on exam. Troponin elevation likely related to demand ischemia with no concerns for ACS. Most recent VAD interrogation without any significant Flow-Alarms     LDH 8,531. Initial concern for LVAD thrombus. However given relatively stable LVAD parameters, degree of LDH elevation is out of proportion. Will continue to monitor for LVAD alarms.      -  continue dobutamine  - Held ACE-I/ARB/ARNi: Lisinopril; due to worsening BERNARDA  - No BB; deferred 2/2 shock  - Aldosterone antagonist: deferred while other medical therapy is optimized  - SCD prophylaxis: ICD  - Fluid status : hypervolemic, IV diuretic today, further management per dialysis  - MAP Goal: 65-90  - On Warfarin for HM-III INR Goal 2-3  - Continue Hydralazine, increase to 75mg TID  - Continue ASA 81 mg per oral daily       # Wide complex tachycardia. Atrial Flutter vs Afib vs Ventricular Tachycardia  # History of Atrial Fibrillation s/p DCCV/history of Atrial Flutter       Wide complex tachycardia HR 140s on presentation in the setting of febrile illness and likely pneumonia. Did not initially respond to adenosine. Concern for VT. Intermittently back into HR 60s with underlying rhythm of atrial flutter 3:1 conduction block. Per EP can not rule out VT, may be dual tachycardias.       - continue home amiodarone  - Ensure K+ >4.0 mEq/L and Mg+2 >2.0  - continue warfarin  - On cardiac telemetry  - EP on board     Pulmonary  #Legionella Pneumonia  Hypoxemic in the setting of mixed cardiogenic septic shock requiring emergent intubation. O2 requirements quickly improved. S/p extubation on room air     Infectious disease     #Sepsis  #Legionella Pneumonia  #Bilateral pulmonary infiltrates  CT showing bilateral diffuse patchy consolidations concerning for infectious process. Low suspicion for recurrent driveline or possible hardware infection (history of MSSA Chronic Driveline Infection). Urine without pyuria. Urine legionella ag positive.   - f/u blood cultures  - continue Abx as per ID              -Cefazolin (2/18/21 - 3/1/21) followed by PO cefalexin              Azithromycin (2/15/21 - 2/25/21)     Renal:  # Acute on Chronic CKD stage IV likely 2/2 Septic Shock   s/p L TDC receiving IHD  - IHD per nephrology  - Daily Weight's  - Strict I/O's  - Daily BMP's  - Avoid any additional nephrotoxicity   - Repeat IV  bumex today     GI  # Shock Liver   # Congestive Hepatopathy - resolved  Hepatoccelular pattern of liver injury  > 3,496, AST 1,441 > 8,490 likely 2/2 to septic shock. INR downtrended s/p 3mg IV vit K. RUQ US without portal vein thrombus. LFTs downtrended nicely. No evidence of synthetic dysfunction.      Hematological   # Chronic Microcytic/Hypochromic Anemia (likely related to iron deficiency)  # Coagulopathy related to sepsis   # IgG Kappa monoclonal Gammopathy of undetermined significance  - Monitor Hgb (baseline 8-9g/dL)  - transfuse for Hb < 7.0   - Patient follows with [facility] access [facility] Oncology (Dr. Ibarra)     # Previous Concern For HIT  On previous hospitalization for LVAD placement (02/06/20-03/20/20), concern for HIT. Platelets 250K --> ~ 90K wuth documented history of exposure to unfractionate heparin products at OSH prior to his [facility] [employer].     At that time, patient underwent two HIT panel tests (first one was negative and second antibody test was positive). No known thrombosis events. DAVINA antibody was negative raising question about validity of diagnosis . Per hematology at the time (Dr. James), no other cause for isolated thrombocytopenia. Immediate recovery of plts following d/c of heparin. 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      - avoiding heparin products  - continue warfarin     Endocrine  # Type II DM     > Last Hgb A1C: 6.8 (01/06/21)     - Continue PTA Insulin Basaglar 10 Units Aq at bedtime  - On Medium sliding scale insulin  - Oral Hypoglycemia Protocol      Neurologic:  # Disorientation/Toxic Metabolic Encephalopathy - Resolved  # Chronic Intracranial Small Vessel Disease        Patient reported some lightheadedness/dizziness and disorientation the days prior to hospitalization while at home. However, no reports of LOC, seizure activity, focal deficits, or findings for acute intracranial pathology on most recent OSH CT head w/o contrast  (02/15/21). Mental status improved, s/p extubation        DVT Prophylaxis: therapeutic warfarin  Guevara Catheter: not present  Code Status: Full Code  Fluids: no IVFs  Lines: RIJ TDC (2/23)    Disposition Plan   Pending establishment of long-term dialysis needs      Entered: Daniel Fleming MD 02/28/2021, 6:52 PM       The patient's care was discussed with the Attending Physician, Dr. Jiang.    Daniel Fleming MD   Cardiology Fellow  [address]  Please see sign in/sign out for up to date coverage information  ______________________________________________________________________    Interval History   No complaints. BP elevated. Went for dialysis today with 1L ultrafiltration. No shortness of breath. No palpitations. No fevers or chills. No cough. Did have episode of epistaxis overnight which resolved with pressure application after ~1 hr. Required transfusion prbcs. Currently nose bleeding not ongoing.     Data reviewed today: I reviewed all medications, new labs and imaging results over the last 24 hours.    Physical Exam   Vital Signs: Temp: 98.1  F (36.7  C) Temp src: Oral BP: 108/78 Pulse: 87   Resp: 20 SpO2: 96 % O2 Device: None (Room air)    Weight: 219 lbs 11.2 oz   Gen: awake and in NAD  HEENT: gauze in Left nares, RIJ TDC in place. ~14cm JVP  Heart: LVAD hum.   Lungs: Bilateral crackles. No wheezes  GI: Soft, nontender, nondistended.   Extremities: WWP, 1+ edema  Neuro: grossly non focal  Skin: no rashes.    Data   Recent Labs   Lab 02/28/21  0517 02/27/21  1700 02/27/21  0415 02/26/21  1724 02/26/21  0424   WBC 10.0  --  9.8  --  9.1   HGB 6.7*  --  7.9* 8.4* 7.0*   MCV 81  --  78  --  76*     --  245  --  228   INR 2.24*  --  2.15*  --  2.30*   * 126* 128* 126* 128*   POTASSIUM 4.6 4.6 4.5 4.4 4.0   CHLORIDE 94 92* 94 94 94   CO2 22 21 21 20 24   BUN 84* 78* 71* 65* 58*   CR 7.07* 6.69* 6.32* 5.59* 4.81*   ANIONGAP 12 12 14 12 10    CALOS 7.8* 7.7* 7.6* 7.9* 7.7*   * 155* 128* 177* 105*   ALBUMIN 2.6* 2.6* 2.6* 2.7* 2.4*   PROTTOTAL 7.0 7.1 6.4* 7.2 6.4*   BILITOTAL 1.4* 0.4 0.5 0.6 0.6   ALKPHOS 138 146 148 160* 147   ALT 48 53 53 60 52   AST 66* 76* 80* 91* 91*     No results found for this or any previous visit (from the past 24 hour(s)).  Medications     dextrose       dextrose Stopped (02/17/21 1600)     DOBUTamine 3 mcg/kg/min (02/28/21 0933)     Warfarin Therapy Reminder         allopurinol  100 mg Oral or Feeding Tube Daily     amiodarone  200 mg Oral Daily     aspirin  81 mg Oral Daily     ceFAZolin  2 g Intravenous Daily     finasteride  5 mg Oral Daily     FLUoxetine  30 mg Oral Daily     hydrALAZINE  100 mg Oral or Feeding Tube Q8H BRITTANI     insulin aspart  1-7 Units Subcutaneous TID AC     insulin aspart  1-5 Units Subcutaneous At Bedtime     melatonin  3 mg Oral At Bedtime     multivitamin RENAL  1 capsule Oral Daily     omeprazole  20 mg Oral QAM AC     senna-docusate  2 tablet Oral BID     tamsulosin  0.4 mg Oral Daily

## 2021-03-01 NOTE — PLAN OF CARE
D: Admitted 2/15/2021 with mixed cardiogenic and distributive shock with intermittent wide-complex tachycardia c/b hypoxemia requiring intubation, BERNARDA, and legionella pneumonia. PMHx relevant for NICM (LVEF 15-20%) s/p HM3 (02/2020) and ICD (03/2020), MSSA driveline infection and bacteremia, VT on amiodarone, nonobstructive CAD, severe MR, atrial fibrillation (AC warfarin), HTN, ABHINAV on home CPAP, CKD IV, DMII, HIT.     I: Monitored vitals and assessed pt status.   Changed:  -No epistaxis this shift.    Running:  -Dobutamine at 3 mcg/kg/min  PRN:  -Ambien at HS  -Robaxin x1         A: A0x4. VSS, on room air/home CPAP with sleep. HR 90s-100s on monitor. Rhythm ST vs a flutter? with BBB. HM3 LVAD without alarms and numbers within parameters over night. Afebrile. Denies chest pain, palpitations, SOB. Denies nausea this shift. PIV saline locked. Double lumen PICC in RUE. Right internal jugular dialysis line CDI. No BM this shift. Blood sugar checks ACHS/0200. Oliguric on HD. Appeared to sleep well between cares. Up with assist of 1.      BP (!) 106/90   Pulse 108   Temp 98.1  F (36.7  C) (Oral)   Resp 18   Wt 99.7 kg (219 lb 11.2 oz)   SpO2 96%   BMI 34.41 kg/m         P: Continue to monitor vitals, hgb, and kidney function closely. Update cards 2 team with changes/concerns.

## 2021-03-01 NOTE — PLAN OF CARE
D- cardiogenic, distributive shock  I/A- Dobutamine gtt continues at 3mcg/kg/min. VSS. RA. SR/ST with BBB. Dialysis today, see note. Good urinary output, creat down to 4.4 from 7 this am. 1 Uunit PRBC transfused for hgb of 6.7, pt tolerated well. Denies pain and SOB. LVAD numbers WNL.   P-Monitoring.

## 2021-03-01 NOTE — PROGRESS NOTES
[facility]    Cardiology Progress Note- Cards 2        Date of Admission:  2/15/2021     Assessment & Plan: S   Eliseo Tanner is a 65 year old male with PMHx relevant for NICM with LVEF 15-20%, NYHA III s/p HM III 2/18/2020 (post op complicated by retrosternal hematoma with bleeding in the lungs), s/p ICD placed on 3/16/2020, h/o MSSA driveline infection and bacteremia 11/5/2020 on prophylactic cephalexin, h/o VT on amiodarone, moderate nonobstructive CAD, severe MR, atrial fibrillation on warfarin, hypertension, ABHINAV requiring CPAP, CKD IV, DMII, HLP,  h/o HIT who presented to the Cardiovascular Unit (4E Cardiac ICU) with mixed cardiogenic and distributive shock withan intermittent wide complex tachycardia. Initially requiring vasopressors and inotropes, intubated for hypoxemia. Treated for legionella pneumonia. Stabilized on low dose dobutamine off of pressors. Extubated. With persistent oliguric renal failure.     Today's Plan  - Continue low dose dobutamine  - continue hydralazine 100mg tid  - Bumtanide 4mg PO - goal net negative 1-2L   - Continue antibiotics :   -PO cefalexin 500mg Q6h (renally dosed equivalent) for 14 days followed by 500mg bid (renally dosed equivalent)              -Cefazolin (2/18/21 - 3/1/21)                Azithromycin (2/15/21 - 2/25/21)               Cefepime (2/16/21 - 2/18/21) (deescalated to cefazolin)               Vancomycin (2/15/21 - 2/18/21) (MRSA nares negative)               Piperacillin tazobactam (2/15/21-2/16/21) (switched for renal protection)  - ongoing PT/OT  - continue warfarin  - Social work arranging dialysis and placement    Cardiovascular:     #Mixed Cardiogenic and Septic Shock - Improved  #Multiorgan Failure - Improved  Presented from [facility] with subacute development of shortness of breath, dyspnea on exertion, dizziness/lightheadedness with near syncopal event found to be febrile  with intermittent wide complex tachycardia. Recent COVID19 moderna vaccination. CT chest showing bilateral infiltrates. Community acquired pneumonia vs. Possible recurrent of MSSA bacteremia WBC 24.5, Procal 7.95, , Lactacte 6.9.     Mildly elevated filling pressures on presentation CVP 18, PA 35/20, PCWP 11. Likely some component of cardiogenic shock. LVAD parameters relatively stable despite markedly elevated LDH 8,531. Euvolemic on exam. Worsening renal function, Cr 4.62, up-trending LFT's/ INR. Legionella antigen positive. Weaned off of vasopressor and maintained on dobutamine. Acute hepatic injury which is improving. Requiring dialysis. Shock resolved.      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 Bhatti (ID clinic)   # Troponin elevation (likely demand ischemia)  # Essential Hypertension  # Hyperlipidemia        Patient with long standing history of NICM previously implanted LVAD (HM III) on 02/18/20 due to worsening functional status and systolic ventricular dysfunction (further complicated by RV dysfunction by VAD hemodynamic compensatory mechanism, VT in ICU now on Amiodarone and Atrial Fibrillation with AVR requiring DCCV on 02/28/20).      Elevated cardiac biomarkers on presentation, SGC with only mildly elevated filling pressures. Euvolemic on exam. Troponin elevation likely related to demand ischemia with no concerns for ACS. Most recent VAD interrogation without any significant Flow-Alarms     LDH 8,531. Initial concern for LVAD thrombus. However given relatively stable LVAD parameters, degree of  LDH elevation is out of proportion. Will continue to monitor for LVAD alarms.      - continue dobutamine  - Held ACE-I/ARB/ARNi: Lisinopril; due to worsening BERNARDA  - No BB; deferred 2/2 shock  - Aldosterone antagonist: deferred while other medical therapy is optimized  - SCD prophylaxis: ICD  - Fluid status : hypervolemic, IV diuretic today, further management per dialysis  - MAP Goal: 65-90  - On Warfarin for HM-III INR Goal 2-3  - Continue Hydralazine, increase to 75mg TID  - Continue ASA 81 mg per oral daily       # Wide complex tachycardia. Atrial Flutter vs Afib vs Ventricular Tachycardia  # History of Atrial Fibrillation s/p DCCV/history of Atrial Flutter       Wide complex tachycardia HR 140s on presentation in the setting of febrile illness and likely pneumonia. Did not initially respond to adenosine. Concern for VT. Intermittently back into HR 60s with underlying rhythm of atrial flutter 3:1 conduction block. Per EP can not rule out VT, may be dual tachycardias.       - continue home amiodarone  - Ensure K+ >4.0 mEq/L and Mg+2 >2.0  - continue warfarin  - On cardiac telemetry  - EP on board     Pulmonary  #Legionella Pneumonia - resolved  Hypoxemic in the setting of mixed cardiogenic septic shock requiring emergent intubation. O2 requirements quickly improved. S/p extubation on room air.     Infectious disease     #Sepsis  #Legionella Pneumonia  #Bilateral pulmonary infiltrates  CT showing bilateral diffuse patchy consolidations concerning for infectious process. Low suspicion for recurrent driveline or possible hardware infection (history of MSSA Chronic Driveline Infection). Urine without pyuria. Urine legionella ag positive.   - f/u blood cultures  - continue Abx as per ID              -Cefazolin (2/18/21 - 3/1/21) followed by PO cefalexin              Azithromycin (2/15/21 - 2/25/21)     Renal:  # Acute on Chronic CKD stage IV likely 2/2 Septic Shock   s/p L TDC receiving IHD  - IHD per nephrology  -  Daily Weight's  - Strict I/O's  - Daily BMP's  - Avoid any additional nephrotoxicity   - intermittent bumetanide on non-dialysis days     GI  # Shock Liver   # Congestive Hepatopathy - resolved  Hepatoccelular pattern of liver injury  > 3,496, AST 1,441 > 8,490 likely 2/2 to septic shock. INR downtrended s/p 3mg IV vit K. RUQ US without portal vein thrombus. LFTs downtrended nicely. No evidence of synthetic dysfunction.      Hematological   # Chronic Microcytic/Hypochromic Anemia (likely related to iron deficiency)  # Coagulopathy related to sepsis   # IgG Kappa monoclonal Gammopathy of undetermined significance  - Monitor Hgb (baseline 8-9g/dL)  - transfuse for Hb < 7.0   - Patient follows with [facility] On[facility] (Dr. Ibarra)     # Previous Concern For HIT  On previous hospitalization for LVAD placement (02/06/20-03/20/20), concern for HIT. Platelets 250K --> ~ 90K wuth documented history of exposure to unfractionate heparin products at OSH prior to his installation at the [facility].     At that time, patient underwent two HIT panel tests (first one was negative and second antibody test was positive). No known thrombosis events. DAVINA antibody was negative raising question about validity of diagnosis . Per hematology at the time (Dr. James), no other cause for isolated thrombocytopenia. Immediate recovery of plts following d/c of heparin. 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      - avoiding heparin products  - continue warfarin     Endocrine  # Type II DM     > Last Hgb A1C: 6.8 (01/06/21)     - Continue PTA Insulin Basaglar 10 Units Aq at bedtime  - On Medium sliding scale insulin  - Oral Hypoglycemia Protocol      Neurologic:  # Disorientation/Toxic Metabolic Encephalopathy - Resolved  # Chronic Intracranial Small Vessel Disease        Patient reported some lightheadedness/dizziness and disorientation the days prior to hospitalization while at home. However, no reports of LOC,  seizure activity, focal deficits, or findings for acute intracranial pathology on most recent OSH CT head w/o contrast (02/15/21). Mental status improved, s/p extubation        DVT Prophylaxis: therapeutic warfarin  Guevara Catheter: not present  Code Status: Full Code  Fluids: no IVFs  Lines: RIJ TDC (2/23)    Disposition Plan   Pending establishment of long-term dialysis needs      Entered: Daniel Fleming MD 03/01/2021, 9:20 AM       The patient's care was discussed with the Attending Physician, Dr. Jiang.    Daniel Fleming MD   Cardiology Fellow  [address]  Please see sign in/sign out for up to date coverage information  ______________________________________________________________________    Interval History   No complaints. Tolerated dialysis well. Feels legs improved but still swollen. No shortness of breath, palpitations, fevers or chills. No epistaxis overnightnot ongoing.     Data reviewed today: I reviewed all medications, new labs and imaging results over the last 24 hours.    Physical Exam   Vital Signs: Temp: 98.3  F (36.8  C) Temp src: Oral BP: (!) 109/90 Pulse: 109   Resp: 16 SpO2: 94 % O2 Device: None (Room air)    Weight: 218 lbs 11.14 oz   Gen: awake and in NAD  HEENT: gauze in Left nares, RIJ TDC in place. ~14cm JVP  Heart: LVAD hum.   Lungs: Bilateral crackles. No wheezes  GI: Soft, nontender, nondistended.   Extremities: WWP, 1+ edema  Neuro: grossly non focal  Skin: no rashes.    Data   Recent Labs   Lab 03/01/21  0545 02/28/21  1840 02/28/21  0517 02/27/21  0415 02/27/21  0415   WBC 7.7  --  10.0  --  9.8   HGB 8.1*  --  6.7*  --  7.9*   MCV 80  --  81  --  78     --  261  --  245   INR 2.08*  --  2.24*  --  2.15*   * 130* 128*   < > 128*   POTASSIUM 4.0 3.8 4.6   < > 4.5   CHLORIDE 97 95 94   < > 94   CO2 25 25 22   < > 21   BUN 56* 49* 84*   < > 71*   CR 5.13* 4.44* 7.07*   < > 6.32*   ANIONGAP 10 10 12   < > 14    CALOS 8.0* 8.1* 7.8*   < > 7.6*   * 186* 127*   < > 128*   ALBUMIN 2.5* 2.7* 2.6*   < > 2.6*   PROTTOTAL 6.4* 7.2 7.0   < > 6.4*   BILITOTAL 1.4* 0.5 1.4*   < > 0.5   ALKPHOS 128 150 138   < > 148   ALT 38 46 48   < > 53   AST 59* 62* 66*   < > 80*    < > = values in this interval not displayed.     No results found for this or any previous visit (from the past 24 hour(s)).  Medications     dextrose       dextrose Stopped (02/17/21 1600)     DOBUTamine 3 mcg/kg/min (03/01/21 0755)     Warfarin Therapy Reminder         allopurinol  100 mg Oral or Feeding Tube Daily     amiodarone  200 mg Oral Daily     aspirin  81 mg Oral Daily     bumetanide  4 mg Oral Daily     ceFAZolin  2 g Intravenous Daily     finasteride  5 mg Oral Daily     FLUoxetine  30 mg Oral Daily     hydrALAZINE  100 mg Oral or Feeding Tube Q8H BRITTANI     insulin aspart  1-7 Units Subcutaneous TID AC     insulin aspart  1-5 Units Subcutaneous At Bedtime     melatonin  3 mg Oral At Bedtime     multivitamin RENAL  1 capsule Oral Daily     omeprazole  20 mg Oral QAM AC     senna-docusate  2 tablet Oral BID     tamsulosin  0.4 mg Oral Daily     warfarin ANTICOAGULANT  5 mg Oral ONCE at 18:00

## 2021-03-02 ENCOUNTER — APPOINTMENT (OUTPATIENT)
Dept: PHYSICAL THERAPY | Facility: CLINIC | Age: 66
DRG: 870 | End: 2021-03-02
Attending: INTERNAL MEDICINE
Payer: MEDICARE

## 2021-03-02 LAB
ALBUMIN SERPL-MCNC: 2.6 G/DL (ref 3.4–5)
ALBUMIN SERPL-MCNC: 2.8 G/DL (ref 3.4–5)
ALP SERPL-CCNC: 134 U/L (ref 40–150)
ALP SERPL-CCNC: 136 U/L (ref 40–150)
ALT SERPL W P-5'-P-CCNC: 36 U/L (ref 0–70)
ALT SERPL W P-5'-P-CCNC: 36 U/L (ref 0–70)
ANION GAP SERPL CALCULATED.3IONS-SCNC: 11 MMOL/L (ref 3–14)
ANION GAP SERPL CALCULATED.3IONS-SCNC: 12 MMOL/L (ref 3–14)
APTT PPP: 40 SEC (ref 22–37)
AST SERPL W P-5'-P-CCNC: 57 U/L (ref 0–45)
AST SERPL W P-5'-P-CCNC: 57 U/L (ref 0–45)
BASOPHILS # BLD AUTO: 0 10E9/L (ref 0–0.2)
BASOPHILS NFR BLD AUTO: 0.5 %
BILIRUB SERPL-MCNC: 0.5 MG/DL (ref 0.2–1.3)
BILIRUB SERPL-MCNC: 0.5 MG/DL (ref 0.2–1.3)
BUN SERPL-MCNC: 73 MG/DL (ref 7–30)
BUN SERPL-MCNC: 83 MG/DL (ref 7–30)
CALCIUM SERPL-MCNC: 7.9 MG/DL (ref 8.5–10.1)
CALCIUM SERPL-MCNC: 8 MG/DL (ref 8.5–10.1)
CHLORIDE SERPL-SCNC: 92 MMOL/L (ref 94–109)
CHLORIDE SERPL-SCNC: 93 MMOL/L (ref 94–109)
CO2 SERPL-SCNC: 23 MMOL/L (ref 20–32)
CO2 SERPL-SCNC: 23 MMOL/L (ref 20–32)
CREAT SERPL-MCNC: 6.41 MG/DL (ref 0.66–1.25)
CREAT SERPL-MCNC: 6.79 MG/DL (ref 0.66–1.25)
DIFFERENTIAL METHOD BLD: ABNORMAL
EOSINOPHIL # BLD AUTO: 0.1 10E9/L (ref 0–0.7)
EOSINOPHIL NFR BLD AUTO: 1.9 %
ERYTHROCYTE [DISTWIDTH] IN BLOOD BY AUTOMATED COUNT: 23 % (ref 10–15)
GFR SERPL CREATININE-BSD FRML MDRD: 8 ML/MIN/{1.73_M2}
GFR SERPL CREATININE-BSD FRML MDRD: 8 ML/MIN/{1.73_M2}
GLUCOSE BLDC GLUCOMTR-MCNC: 124 MG/DL (ref 70–99)
GLUCOSE BLDC GLUCOMTR-MCNC: 135 MG/DL (ref 70–99)
GLUCOSE BLDC GLUCOMTR-MCNC: 137 MG/DL (ref 70–99)
GLUCOSE BLDC GLUCOMTR-MCNC: 143 MG/DL (ref 70–99)
GLUCOSE BLDC GLUCOMTR-MCNC: 144 MG/DL (ref 70–99)
GLUCOSE SERPL-MCNC: 126 MG/DL (ref 70–99)
GLUCOSE SERPL-MCNC: 184 MG/DL (ref 70–99)
HCT VFR BLD AUTO: 29.2 % (ref 40–53)
HGB BLD-MCNC: 9.2 G/DL (ref 13.3–17.7)
IMM GRANULOCYTES # BLD: 0.1 10E9/L (ref 0–0.4)
IMM GRANULOCYTES NFR BLD: 1.4 %
INR PPP: 1.87 (ref 0.86–1.14)
INTERPRETATION ECG - MUSE: NORMAL
LABORATORY COMMENT REPORT: NORMAL
LDH SERPL L TO P-CCNC: 314 U/L (ref 85–227)
LYMPHOCYTES # BLD AUTO: 0.5 10E9/L (ref 0.8–5.3)
LYMPHOCYTES NFR BLD AUTO: 6.2 %
MAGNESIUM SERPL-MCNC: 1.7 MG/DL (ref 1.6–2.3)
MCH RBC QN AUTO: 25.8 PG (ref 26.5–33)
MCHC RBC AUTO-ENTMCNC: 31.5 G/DL (ref 31.5–36.5)
MCV RBC AUTO: 82 FL (ref 78–100)
MONOCYTES # BLD AUTO: 0.9 10E9/L (ref 0–1.3)
MONOCYTES NFR BLD AUTO: 12.6 %
NEUTROPHILS # BLD AUTO: 5.6 10E9/L (ref 1.6–8.3)
NEUTROPHILS NFR BLD AUTO: 77.4 %
NRBC # BLD AUTO: 0 10*3/UL
NRBC BLD AUTO-RTO: 0 /100
PLATELET # BLD AUTO: 218 10E9/L (ref 150–450)
POTASSIUM SERPL-SCNC: 3.9 MMOL/L (ref 3.4–5.3)
POTASSIUM SERPL-SCNC: 4.1 MMOL/L (ref 3.4–5.3)
PROT SERPL-MCNC: 7 G/DL (ref 6.8–8.8)
PROT SERPL-MCNC: 7.4 G/DL (ref 6.8–8.8)
RBC # BLD AUTO: 3.57 10E12/L (ref 4.4–5.9)
SARS-COV-2 RNA RESP QL NAA+PROBE: NEGATIVE
SODIUM SERPL-SCNC: 127 MMOL/L (ref 133–144)
SODIUM SERPL-SCNC: 127 MMOL/L (ref 133–144)
SPECIMEN SOURCE: NORMAL
WBC # BLD AUTO: 7.3 10E9/L (ref 4–11)

## 2021-03-02 PROCEDURE — 250N000013 HC RX MED GY IP 250 OP 250 PS 637: Performed by: INTERNAL MEDICINE

## 2021-03-02 PROCEDURE — 999N000157 HC STATISTIC RCP TIME EA 10 MIN

## 2021-03-02 PROCEDURE — 99233 SBSQ HOSP IP/OBS HIGH 50: CPT | Mod: GC | Performed by: INTERNAL MEDICINE

## 2021-03-02 PROCEDURE — 250N000013 HC RX MED GY IP 250 OP 250 PS 637: Performed by: NURSE PRACTITIONER

## 2021-03-02 PROCEDURE — 250N000013 HC RX MED GY IP 250 OP 250 PS 637: Performed by: STUDENT IN AN ORGANIZED HEALTH CARE EDUCATION/TRAINING PROGRAM

## 2021-03-02 PROCEDURE — 85025 COMPLETE CBC W/AUTO DIFF WBC: CPT | Performed by: STUDENT IN AN ORGANIZED HEALTH CARE EDUCATION/TRAINING PROGRAM

## 2021-03-02 PROCEDURE — 250N000011 HC RX IP 250 OP 636: Performed by: STUDENT IN AN ORGANIZED HEALTH CARE EDUCATION/TRAINING PROGRAM

## 2021-03-02 PROCEDURE — 83735 ASSAY OF MAGNESIUM: CPT | Performed by: STUDENT IN AN ORGANIZED HEALTH CARE EDUCATION/TRAINING PROGRAM

## 2021-03-02 PROCEDURE — 80053 COMPREHEN METABOLIC PANEL: CPT | Performed by: INTERNAL MEDICINE

## 2021-03-02 PROCEDURE — 93750 INTERROGATION VAD IN PERSON: CPT | Performed by: INTERNAL MEDICINE

## 2021-03-02 PROCEDURE — U0005 INFEC AGEN DETEC AMPLI PROBE: HCPCS | Performed by: STUDENT IN AN ORGANIZED HEALTH CARE EDUCATION/TRAINING PROGRAM

## 2021-03-02 PROCEDURE — 258N000003 HC RX IP 258 OP 636: Performed by: STUDENT IN AN ORGANIZED HEALTH CARE EDUCATION/TRAINING PROGRAM

## 2021-03-02 PROCEDURE — 93005 ELECTROCARDIOGRAM TRACING: CPT

## 2021-03-02 PROCEDURE — 85610 PROTHROMBIN TIME: CPT | Performed by: STUDENT IN AN ORGANIZED HEALTH CARE EDUCATION/TRAINING PROGRAM

## 2021-03-02 PROCEDURE — 80053 COMPREHEN METABOLIC PANEL: CPT | Performed by: STUDENT IN AN ORGANIZED HEALTH CARE EDUCATION/TRAINING PROGRAM

## 2021-03-02 PROCEDURE — 90937 HEMODIALYSIS REPEATED EVAL: CPT

## 2021-03-02 PROCEDURE — 99232 SBSQ HOSP IP/OBS MODERATE 35: CPT | Mod: 25 | Performed by: INTERNAL MEDICINE

## 2021-03-02 PROCEDURE — G0463 HOSPITAL OUTPT CLINIC VISIT: HCPCS

## 2021-03-02 PROCEDURE — 250N000013 HC RX MED GY IP 250 OP 250 PS 637

## 2021-03-02 PROCEDURE — 97530 THERAPEUTIC ACTIVITIES: CPT | Mod: GP | Performed by: PHYSICAL THERAPIST

## 2021-03-02 PROCEDURE — 83615 LACTATE (LD) (LDH) ENZYME: CPT | Performed by: STUDENT IN AN ORGANIZED HEALTH CARE EDUCATION/TRAINING PROGRAM

## 2021-03-02 PROCEDURE — 85730 THROMBOPLASTIN TIME PARTIAL: CPT | Performed by: STUDENT IN AN ORGANIZED HEALTH CARE EDUCATION/TRAINING PROGRAM

## 2021-03-02 PROCEDURE — 999N001017 HC STATISTIC GLUCOSE BY METER IP

## 2021-03-02 PROCEDURE — U0003 INFECTIOUS AGENT DETECTION BY NUCLEIC ACID (DNA OR RNA); SEVERE ACUTE RESPIRATORY SYNDROME CORONAVIRUS 2 (SARS-COV-2) (CORONAVIRUS DISEASE [COVID-19]), AMPLIFIED PROBE TECHNIQUE, MAKING USE OF HIGH THROUGHPUT TECHNOLOGIES AS DESCRIBED BY CMS-2020-01-R: HCPCS | Performed by: STUDENT IN AN ORGANIZED HEALTH CARE EDUCATION/TRAINING PROGRAM

## 2021-03-02 PROCEDURE — 214N000001 HC R&B CCU UMMC

## 2021-03-02 PROCEDURE — 97116 GAIT TRAINING THERAPY: CPT | Mod: GP | Performed by: PHYSICAL THERAPIST

## 2021-03-02 PROCEDURE — 93010 ELECTROCARDIOGRAM REPORT: CPT | Performed by: INTERNAL MEDICINE

## 2021-03-02 RX ORDER — POTASSIUM CHLORIDE 750 MG/1
20 TABLET, EXTENDED RELEASE ORAL
Status: DISCONTINUED | OUTPATIENT
Start: 2021-03-02 | End: 2021-03-04

## 2021-03-02 RX ORDER — MAGNESIUM SULFATE HEPTAHYDRATE 40 MG/ML
2 INJECTION, SOLUTION INTRAVENOUS
Status: DISCONTINUED | OUTPATIENT
Start: 2021-03-02 | End: 2021-03-16 | Stop reason: HOSPADM

## 2021-03-02 RX ORDER — LIDOCAINE 40 MG/G
CREAM TOPICAL
Status: DISCONTINUED | OUTPATIENT
Start: 2021-03-02 | End: 2021-03-02

## 2021-03-02 RX ORDER — WARFARIN SODIUM 5 MG/1
5 TABLET ORAL
Status: COMPLETED | OUTPATIENT
Start: 2021-03-02 | End: 2021-03-02

## 2021-03-02 RX ORDER — POTASSIUM CHLORIDE 750 MG/1
40 TABLET, EXTENDED RELEASE ORAL
Status: DISCONTINUED | OUTPATIENT
Start: 2021-03-02 | End: 2021-03-04

## 2021-03-02 RX ADMIN — INSULIN ASPART 1 UNITS: 100 INJECTION, SOLUTION INTRAVENOUS; SUBCUTANEOUS at 13:41

## 2021-03-02 RX ADMIN — FINASTERIDE 5 MG: 5 TABLET, FILM COATED ORAL at 07:52

## 2021-03-02 RX ADMIN — ZOLPIDEM TARTRATE 5 MG: 5 TABLET, FILM COATED ORAL at 21:25

## 2021-03-02 RX ADMIN — CEPHALEXIN 500 MG: 500 CAPSULE ORAL at 21:23

## 2021-03-02 RX ADMIN — METHOCARBAMOL 500 MG: 500 TABLET, FILM COATED ORAL at 15:53

## 2021-03-02 RX ADMIN — Medication: at 18:21

## 2021-03-02 RX ADMIN — DOCUSATE SODIUM 50 MG AND SENNOSIDES 8.6 MG 1 TABLET: 8.6; 5 TABLET, FILM COATED ORAL at 15:52

## 2021-03-02 RX ADMIN — SODIUM CHLORIDE 250 ML: 9 INJECTION, SOLUTION INTRAVENOUS at 18:21

## 2021-03-02 RX ADMIN — ASPIRIN 81 MG CHEWABLE TABLET 81 MG: 81 TABLET CHEWABLE at 07:52

## 2021-03-02 RX ADMIN — HYDRALAZINE HYDROCHLORIDE 100 MG: 100 TABLET, FILM COATED ORAL at 06:03

## 2021-03-02 RX ADMIN — FLUOXETINE 30 MG: 20 CAPSULE ORAL at 07:51

## 2021-03-02 RX ADMIN — HYDRALAZINE HYDROCHLORIDE 100 MG: 100 TABLET, FILM COATED ORAL at 13:37

## 2021-03-02 RX ADMIN — WARFARIN SODIUM 5 MG: 5 TABLET ORAL at 21:25

## 2021-03-02 RX ADMIN — METHOCARBAMOL 500 MG: 500 TABLET, FILM COATED ORAL at 08:01

## 2021-03-02 RX ADMIN — AMIODARONE HYDROCHLORIDE 200 MG: 200 TABLET ORAL at 07:52

## 2021-03-02 RX ADMIN — TAMSULOSIN HYDROCHLORIDE 0.4 MG: 0.4 CAPSULE ORAL at 07:51

## 2021-03-02 RX ADMIN — BUMETANIDE 4 MG: 2 TABLET ORAL at 07:51

## 2021-03-02 RX ADMIN — CEPHALEXIN 500 MG: 500 CAPSULE ORAL at 07:51

## 2021-03-02 RX ADMIN — MELATONIN TAB 3 MG 3 MG: 3 TAB at 21:25

## 2021-03-02 RX ADMIN — OMEPRAZOLE 20 MG: 20 CAPSULE, DELAYED RELEASE ORAL at 07:52

## 2021-03-02 RX ADMIN — ALLOPURINOL 100 MG: 100 TABLET ORAL at 07:52

## 2021-03-02 RX ADMIN — DOBUTAMINE HYDROCHLORIDE 3 MCG/KG/MIN: 200 INJECTION INTRAVENOUS at 03:59

## 2021-03-02 RX ADMIN — SODIUM CHLORIDE 300 ML: 9 INJECTION, SOLUTION INTRAVENOUS at 18:20

## 2021-03-02 RX ADMIN — HYDRALAZINE HYDROCHLORIDE 100 MG: 100 TABLET, FILM COATED ORAL at 21:24

## 2021-03-02 RX ADMIN — Medication 1 CAPSULE: at 07:52

## 2021-03-02 ASSESSMENT — ACTIVITIES OF DAILY LIVING (ADL)
ADLS_ACUITY_SCORE: 15

## 2021-03-02 NOTE — CONSULTS
[facility] Nurse Inpatient Wound Assessment   Reason for consultation: Evaluate and treat  foot wounds    Assessment  No wounds present, some minor fissures in intact calluses.   Initial assessment   Treatment Plan  Foot care: Daily and PRN  Cleanse feet per unit routine and dry thoroughly.  Apply Sween 24 (pink top) to bilateral feet.  Do not apply between toes.     Orders Written  Recommended provider order: None, at this time  WOC Nurse follow-up plan:signing off  Nursing to notify the Provider(s) and re-consult the WOC Nurse if wound(s) deteriorates or new skin concern.    Patient History  According to provider note(s):  64 yo M with PMHx  NICM  s/p HM III , VT, severe MR, atrial fibrillation on warfarin, hypertension, CKD IV, DMII, HIT presented to OSH with fevers and cough in the setting of syncopal episode transferred to [facility] for further cares. Hospitalization complicated by Afib with RVR with hypotension and intubation with upgrade of cares to the ICU. Found to be in septic shock 2/2 legionella pneumonia with possible cardiogenic shock. Nephrology was consulted for evaluation and management of anuric BERNARDA. Started on RRT 2/16.    Objective Data    Active Diet Order  Orders Placed This Encounter      2 Gram Sodium Diet      NPO per Anesthesia Guidelines for Procedure/Surgery Except for: Meds      Output:   I/O last 3 completed shifts:  In: 1203.85 [P.O.:970; I.V.:233.85]  Out: 1750 [Urine:1750]    Risk Assessment:   Sensory Perception: 4-->no impairment  Moisture: 4-->rarely moist  Activity: 3-->walks occasionally  Mobility: 3-->slightly limited  Nutrition: 3-->adequate  Friction and Shear: 3-->no apparent problem  Jens Score: 20                          Labs:   Recent Labs   Lab 03/02/21  0401   ALBUMIN 2.6*   HGB 9.2*   INR 1.87*   WBC 7.3       Physical Exam  Areas of skin assessed: focused feet    Left foot 3/2    Right foot 3/2        Interventions  Visual inspection and assessment completed   Wound Care  Rationale Protect periwound skin and Provide protection   Wound Care: completed by RN  Supplies: floor stock, discussed with RN and discussed with patient  Current off-loading measures: Pillows  Current support surface: Standard  Atmos Air mattress  Education provided to: plan of care  Discussed plan of care with Patient and Nurse    Latrice Rossi RN, CWOCN

## 2021-03-02 NOTE — PLAN OF CARE
D: Admitted 2/15 with mixed cardiogenic and distributive shock. C/b intermittent wide complex tachycardia,  requiring vasopressors and inotropes, pneumonia, intubated for hypoxemia. Hx: NICM s/p HM III 2/18/2020, s/p ICD placed on 3/16/2020, MSSA driveline infection and bacteremia 11/5/2020 on prophylactic cephalexin, VT on amiodarone, moderate nonobstructive CAD, severe MR, atrial fibrillation on warfarin, hypertension, ABHINAV requiring CPAP, CKD IV, DMII, HLP, HIT    I: Monitored vitals and assessed pt status.   Changed:  CHAMP/ cardioversion cancelled d/t low INR. Will attempt again tomorrow  WOCN consult completed, skin care order placed  Creatinine 6.41, HD run today     Running:  Dobutamine @ 3 mcg/kg/min    PRN:  Robaxin for muscle aches  Senna for constipation    A: VSS, A&O, up with 1-2 and walker. Tele remains unchanged, A flutter/wide complex tachycardia. Oxygen stable on RA. LVAD numbers WNL, no alarms. Good urine output today.     Temp:  [97.8  F (36.6  C)-98.5  F (36.9  C)] 97.9  F (36.6  C)  Pulse:  [101-107] 106  Resp:  [16-18] 16  BP: ()/(76-96) 83/76  SpO2:  [94 %-98 %] 97 %      P: Continue to monitor Pt status and report changes to treatment team.

## 2021-03-02 NOTE — PROGRESS NOTES
D: Saw patient for routine VAD Coordinator rounding.  Pt reporting some frustration with newly needing dialysis and the problems it presents for going home.  Pt advised SW currently helping to arrange this.  Overall, pt reports feeling much better.  I: Discussed POC and provided support and listened to patient and care giver's thoughts and concerns.  P: Continue to follow patient and address any questions or concerns patient and or caregiver may have.

## 2021-03-02 NOTE — PROGRESS NOTES
.    Nephrology Progress Note  03/02/2021         Assessment & Recommendations:   66 yo M with PMHx  NICM  s/p HM III , VT, severe MR, atrial fibrillation on warfarin, hypertension, CKD IV, DMII, HIT presented to OSH with fevers and cough in the setting of syncopal episode transferred to [facility] for further cares. Hospitalization complicated by Afib with RVR with hypotension and intubation with upgrade of cares to the ICU. Found to be in septic shock 2/2 legionella pneumonia with possible cardiogenic shock. Nephrology was consulted for evaluation and management of anuric BERNARDA. Started on RRT 2/16.     Oligoanuric BERNARDA 2/2 ischemic ATN  Baseline Cr last yr s/p LVAD placement was around 1. On admission ~2.3 and doubled within 24 hours with rapid decrease in UOP. UA unimpressive with baseline proteinuria and new granular casts. Initiated on RRT 2/16. Monitoring for renal recovery and now having improved UOP, but lagging with clearance. With nosebleeds the last few days with concern for mild uremic platelet dysfunction contributing to epistaxis.  - Today, UF run for 2 hrs with goal to remove 3L. Will plan for dialysis tomorrow  - Agree with bumex 4 mg BID  - Avoid nephrotoxins as able  - Renally dose meds  - Monitor I/Os   Discussed with patient. The rise in creatinine demonstrates no functional difference in GFR although it denotes a lack of improvement in clearance. There are no indications for dialysis per se and an isolated UF run is generally better tolerated. Since this is his primary problem we will aim to pull fluid. We will continue to adjust diuretics as able to help prevent fluid accumulation between runs.     Volume status  Euvolemic to slight hypervolemia     Electrolytes   Mild Hyponatremia likely 2/2 diuretics - if it worsens, can obtain random urine Na and urine osmolality    Acid/base - stable   Anemia - Hb 9 this AM, received 1u pRBCs on 2/28. Had intermittent epistaxis for last few  days.     Recommendations were communicated to primary team via this note     Seen and discussed with Dr. Hunter Barry MD  [phone]    Interval History :   Nursing and provider notes from last 24 hours reviewed.    No major overnight events. Tolerating oral intake. Denies SOB, chest pain.    Urine output improving - 800 cc the last 24 hrs  I have seen and examined the patient and reviewed all current labs and findings. I concur with the assessment and plan as it is outlined. Any changes to the note made by me are in bold. Char Harrison MD MS FNKF      Review of Systems:   4pt ROS negative except as above in HPI    Physical Exam:   I/O last 3 completed shifts:  In: 1203.85 [P.O.:970; I.V.:233.85]  Out: 1750 [Urine:1750]   BP 99/81 (BP Location: Left arm)   Pulse 104   Temp 98  F (36.7  C) (Oral)   Resp 16   Wt 99.6 kg (219 lb 9.6 oz)   SpO2 95%   BMI 34.39 kg/m       General : Pt awake, not in acute distress   Lungs : No increased WOB. No appreciable crackles on the lung fields  Cardiac : LVAD in place  Abdomen : Soft/ND/NT  LE : trace to 1+ edema on the LE, worsened from yesterday  Dialysis Access : tunneled RIJ catheter    Labs:   All labs reviewed by me  Electrolytes/Renal -   Recent Labs   Lab Test 03/02/21  0401 03/01/21  1800 03/01/21  0545 02/22/21  1539 02/22/21  1539 02/22/21  0353 02/22/21  0353 02/21/21  1617 02/21/21  1617   * 127* 132*   < >  --    < > 136   < >  --    POTASSIUM 3.9 4.2 4.0   < >  --    < > 4.7   < >  --    CHLORIDE 93* 94 97   < >  --    < > 106   < >  --    CO2 23 23 25   < >  --    < > 24   < >  --    BUN 73* 68* 56*   < >  --    < > 30   < >  --    CR 6.41* 5.86* 5.13*   < >  --    < > 1.94*   < >  --    * 180* 104*   < >  --    < > 109*   < >  --    CALOS 7.9* 7.9* 8.0*   < >  --    < > 8.1*   < >  --    MAG 1.7  --   --   --  2.6*  --  2.5*  --  2.6*   PHOS  --   --   --   --  5.2*  --  4.4  --  4.5    < > = values in this interval not displayed.        CBC -   Recent Labs   Lab Test 03/02/21  0401 03/01/21  0545 02/28/21  0517   WBC 7.3 7.7 10.0   HGB 9.2* 8.1* 6.7*    226 261       LFTs -   Recent Labs   Lab Test 03/02/21  0401 03/01/21  1800 03/01/21  0545   ALKPHOS 136 151* 128   BILITOTAL 0.5 0.5 1.4*   ALT 36 43 38   AST 57* 66* 59*   PROTTOTAL 7.0 7.2 6.4*   ALBUMIN 2.6* 2.6* 2.5*       Iron Panel -   Recent Labs   Lab Test 02/27/21  0415 11/16/20  0616 02/08/20  0408   IRON 28* 16* 25*   IRONSAT 9* 6* 8*   HEAVENLY 276 75 189         Imaging:    Current Medications:    allopurinol  100 mg Oral or Feeding Tube Daily     amiodarone  200 mg Oral Daily     aspirin  81 mg Oral Daily     bumetanide  4 mg Oral Daily     cephALEXin  500 mg Oral Q12H BRITTANI     finasteride  5 mg Oral Daily     FLUoxetine  30 mg Oral Daily     hydrALAZINE  100 mg Oral or Feeding Tube Q8H BRITTANI     insulin aspart  1-7 Units Subcutaneous TID AC     insulin aspart  1-5 Units Subcutaneous At Bedtime     melatonin  3 mg Oral At Bedtime     multivitamin RENAL  1 capsule Oral Daily     omeprazole  20 mg Oral QAM AC     oxymetazoline  2 spray Both Nostrils BID     senna-docusate  2 tablet Oral BID     tamsulosin  0.4 mg Oral Daily     warfarin ANTICOAGULANT  5 mg Oral ONCE at 18:00       dextrose       dextrose Stopped (02/17/21 1600)     DOBUTamine 3 mcg/kg/min (03/02/21 0359)     - MEDICATION INSTRUCTIONS -       - MEDICATION INSTRUCTIONS -       Warfarin Therapy Reminder       Jese Barry MD

## 2021-03-02 NOTE — PLAN OF CARE
D: Admitted 2/15 with mixed cardiogenic and distributive shock. C/b intermittent wide complex tachycardia,  requiring vasopressors and inotropes, pneumonia, intubated for hypoxemia. Hx: NICM s/p HM III 2/18/2020, s/p ICD placed on 3/16/2020, MSSA driveline infection and bacteremia 11/5/2020 on prophylactic cephalexin, VT on amiodarone, moderate nonobstructive CAD, severe MR, atrial fibrillation on warfarin, hypertension, ABHINAV requiring CPAP, CKD IV, DMII, HLP, HIT    I: Monitored vitals and assessed pt status.   Changed:  Continues to have wide complex tachycardia. Plan for CHAMP/ Cardioversion 3/2  PO Bumex this AM with minimal urine output. Received 1 time dose IV Bumex this afternoon with minimal urine output.  Last dose of IV Ancef tonight, start PO keflex 3/2  Pt had mild nose bleed this morning, team aware, Afrin spray ordered only use when needed  WOC consult placed for dry/ cracked areas on bilateral feet and heels    A: VSS, A&O, up with 2 and GB. Oxygen stable on RA. LVAD numbers WNL, no alarms. Up in chair X3 today, working with PT/OT.     Temp:  [97.7  F (36.5  C)-98.3  F (36.8  C)] 97.7  F (36.5  C)  Pulse:  [100-109] 102  Resp:  [16-20] 16  BP: ()/(55-90) 111/58  SpO2:  [94 %-98 %] 97 %      P: Continue to monitor Pt status and report changes to treatment team.

## 2021-03-02 NOTE — PROGRESS NOTES
[facility]    Cardiology Progress Note- Cards 2        Date of Admission:  2/15/2021     Today's Plan  - Continue low dose dobutamine  - continue hydralazine 100mg tid  - discuss with Nephrology patient is hypervolemic and would benefit from additional ultrafiltration while inpatient.   - Bumtanide in between dialysis days- goal net negative 1-2L   - Continue antibiotics :   -PO cefalexin 500mg Q6h (renally dosed equivalent) for 14 days followed by 500mg bid (renally dosed equivalent)              -Cefazolin (2/18/21 - 3/1/21)                Azithromycin (2/15/21 - 2/25/21)               Cefepime (2/16/21 - 2/18/21) (deescalated to cefazolin)               Vancomycin (2/15/21 - 2/18/21) (MRSA nares negative)               Piperacillin tazobactam (2/15/21-2/16/21) (switched for renal protection)  - ongoing PT/OT  - continue warfarin  - Social work arranging dialysis and placement    Assessment & Plan: [facility]   Eliseo Tanner is a 65 year old male with PMHx relevant for NICM with LVEF 15-20%, NYHA III s/p HM III 2/18/2020 (post op complicated by retrosternal hematoma with bleeding in the lungs), s/p ICD placed on 3/16/2020, h/o MSSA driveline infection and bacteremia 11/5/2020 on prophylactic cephalexin, h/o VT on amiodarone, moderate nonobstructive CAD, severe MR, atrial fibrillation on warfarin, hypertension, ABHINAV requiring CPAP, CKD IV, DMII, HLP,  h/o HIT who presented to the Cardiovascular Unit (4E Cardiac ICU) with mixed cardiogenic and distributive shock withan intermittent wide complex tachycardia. Initially requiring vasopressors and inotropes, intubated for hypoxemia. Treated for legionella pneumonia. Stabilized on low dose dobutamine off of pressors. Extubated. With persistent oliguric renal failure.       Cardiovascular:     #Mixed Cardiogenic and Septic Shock - Improved  #Multiorgan Failure - Improved  Presented from [facility] with  subacute development of shortness of breath, dyspnea on exertion, dizziness/lightheadedness with near syncopal event found to be febrile with intermittent wide complex tachycardia. Recent COVID19 moderna vaccination. CT chest showing bilateral infiltrates. Community acquired pneumonia vs. Possible recurrent of MSSA bacteremia WBC 24.5, Procal 7.95, , Lactacte 6.9.     Mildly elevated filling pressures on presentation CVP 18, PA 35/20, PCWP 11. Likely some component of cardiogenic shock. LVAD parameters relatively stable despite markedly elevated LDH 8,531. Euvolemic on exam. Worsening renal function, Cr 4.62, up-trending LFT's/ INR. Legionella antigen positive. Weaned off of vasopressor and maintained on dobutamine. Acute hepatic injury which is improving. Requiring dialysis. Shock resolved.      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 Bhatti (ID clinic)   # Troponin elevation (likely demand ischemia)  # Essential Hypertension  # Hyperlipidemia        Patient with long standing history of NICM previously implanted LVAD (HM III) on 02/18/20 due to worsening functional status and systolic ventricular dysfunction (further complicated by RV dysfunction by VAD hemodynamic compensatory mechanism, VT in ICU now on Amiodarone and Atrial Fibrillation with AVR requiring DCCV on 02/28/20).      Elevated cardiac biomarkers on presentation, SGC with only mildly elevated filling pressures. Euvolemic on exam. Troponin elevation likely related to demand ischemia with no concerns for ACS. Most recent VAD interrogation without any significant  Flow-Alarms     LDH 8,531. Initial concern for LVAD thrombus. However given relatively stable LVAD parameters, degree of LDH elevation is out of proportion. Will continue to monitor for LVAD alarms.      - continue dobutamine for renal recover  - Held ACE-I/ARB/ARNi: Lisinopril; due to worsening BERNARDA  - No BB; deferred 2/2 shock  - Aldosterone antagonist: deferred while other medical therapy is optimized  - SCD prophylaxis: ICD  - Fluid status : hypervolemic, IV diuretic today, further management per dialysis  - MAP Goal: 65-90  - On Warfarin for HM-III INR Goal 2-3  - Continue Hydralazine, increase to 75mg TID  - Continue ASA 81 mg per oral daily       # Wide complex tachycardia. Atrial Flutter vs Afib vs Ventricular Tachycardia  # History of Atrial Fibrillation s/p DCCV/history of Atrial Flutter       Wide complex tachycardia HR 140s on presentation in the setting of febrile illness and likely pneumonia. Did not initially respond to adenosine. Concern for VT. Intermittently back into HR 60s with underlying rhythm of atrial flutter 3:1 conduction block. Per EP can not rule out VT, may be dual tachycardias.       - continue home amiodarone  - Ensure K+ >4.0 mEq/L and Mg+2 >2.0  - continue warfarin  - On cardiac telemetry  - EP on board     Pulmonary  #Legionella Pneumonia - resolved  Hypoxemic in the setting of mixed cardiogenic septic shock requiring emergent intubation. O2 requirements quickly improved. S/p extubation on room air.     Infectious disease     #Sepsis  #Legionella Pneumonia  #Bilateral pulmonary infiltrates  CT showing bilateral diffuse patchy consolidations concerning for infectious process. Low suspicion for recurrent driveline or possible hardware infection (history of MSSA Chronic Driveline Infection). Urine without pyuria. Urine legionella ag positive.   - f/u blood cultures  - continue Abx as per ID              -Cefazolin (2/18/21 - 3/1/21) followed by PO cefalexin              Azithromycin  (2/15/21 - 2/25/21)     Renal:  # Acute on Chronic CKD stage IV likely 2/2 Septic Shock   s/p L TDC receiving IHD  - IHD per nephrology  - Daily Weight's  - Strict I/O's  - Daily BMP's  - Avoid any additional nephrotoxicity   - intermittent bumetanide on non-dialysis days     GI  # Shock Liver   # Congestive Hepatopathy - resolved  Hepatoccelular pattern of liver injury  > 3,496, AST 1,441 > 8,490 likely 2/2 to septic shock. INR downtrended s/p 3mg IV vit K. RUQ US without portal vein thrombus. LFTs downtrended nicely. No evidence of synthetic dysfunction.      Hematological   # Chronic Microcytic/Hypochromic Anemia (likely related to iron deficiency)  # Coagulopathy related to sepsis   # IgG Kappa monoclonal Gammopathy of undetermined significance  - Monitor Hgb (baseline 8-9g/dL)  - transfuse for Hb < 7.0   - Patient follows with [facility] (Dr. Ibarra)     # Previous Concern For HIT  On previous hospitalization for LVAD placement (02/06/20-03/20/20), concern for HIT. Platelets 250K --> ~ 90K wuth documented history of exposure to unfractionate heparin products at OSH prior to his installation at the [facility].     At that time, patient underwent two HIT panel tests (first one was negative and second antibody test was positive). No known thrombosis events. DAVINA antibody was negative raising question about validity of diagnosis . Per hematology at the time (Dr. James), no other cause for isolated thrombocytopenia. Immediate recovery of plts following d/c of heparin. 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      - avoiding heparin products  - continue warfarin     Endocrine  # Type II DM     > Last Hgb A1C: 6.8 (01/06/21)     - Continue PTA Insulin Basaglar 10 Units Aq at bedtime  - On Medium sliding scale insulin  - Oral Hypoglycemia Protocol      Neurologic:  # Disorientation/Toxic Metabolic Encephalopathy - Resolved  # Chronic Intracranial Small Vessel Disease        Patient  reported some lightheadedness/dizziness and disorientation the days prior to hospitalization while at home. However, no reports of LOC, seizure activity, focal deficits, or findings for acute intracranial pathology on most recent OSH CT head w/o contrast (02/15/21). Mental status improved, s/p extubation        DVT Prophylaxis: therapeutic warfarin  Guevara Catheter: not present  Code Status: Full Code  Fluids: no IVFs  Lines: RIJ TDC (2/23)    Disposition Plan   Pending establishment of long-term dialysis needs      Entered: Daniel Fleming MD 03/02/2021, 8:04 AM       The patient's care was discussed with the Attending Physician, Dr. Jiang.    Daniel Fleming MD   Cardiology Fellow  [address]  Please see sign in/sign out for up to date coverage information  ______________________________________________________________________    Interval History   No complaints. Tolerated dialysis well. Feels legs improved but still swollen. No shortness of breath, palpitations, fevers or chills. No epistaxis overnightnot ongoing.     Data reviewed today: I reviewed all medications, new labs and imaging results over the last 24 hours.    Physical Exam   Vital Signs: Temp: 98.2  F (36.8  C) Temp src: Oral BP: 104/84 Pulse: 106   Resp: 18 SpO2: 94 % O2 Device: None (Room air)    Weight: 219 lbs 9.6 oz   Gen: awake and in NAD  HEENT: gauze in Left nares, RIJ TDC in place. ~14cm JVP  Heart: LVAD hum.   Lungs: Bilateral crackles. No wheezes  GI: Soft, nontender, nondistended.   Extremities: WWP, 1+ edema  Neuro: grossly non focal  Skin: no rashes.    Data   Recent Labs   Lab 03/02/21  0401 03/01/21  1800 03/01/21  0545 02/28/21  0517 02/28/21  0517   WBC 7.3  --  7.7  --  10.0   HGB 9.2*  --  8.1*  --  6.7*   MCV 82  --  80  --  81     --  226  --  261   INR 1.87*  --  2.08*  --  2.24*   * 127* 132*   < > 128*   POTASSIUM 3.9 4.2 4.0   < > 4.6   CHLORIDE 93* 94  97   < > 94   CO2 23 23 25   < > 22   BUN 73* 68* 56*   < > 84*   CR 6.41* 5.86* 5.13*   < > 7.07*   ANIONGAP 11 10 10   < > 12   CALOS 7.9* 7.9* 8.0*   < > 7.8*   * 180* 104*   < > 127*   ALBUMIN 2.6* 2.6* 2.5*   < > 2.6*   PROTTOTAL 7.0 7.2 6.4*   < > 7.0   BILITOTAL 0.5 0.5 1.4*   < > 1.4*   ALKPHOS 136 151* 128   < > 138   ALT 36 43 38   < > 48   AST 57* 66* 59*   < > 66*    < > = values in this interval not displayed.     No results found for this or any previous visit (from the past 24 hour(s)).  Medications     dextrose       dextrose Stopped (02/17/21 1600)     DOBUTamine 3 mcg/kg/min (03/02/21 6892)     - MEDICATION INSTRUCTIONS -       - MEDICATION INSTRUCTIONS -       Warfarin Therapy Reminder         allopurinol  100 mg Oral or Feeding Tube Daily     amiodarone  200 mg Oral Daily     aspirin  81 mg Oral Daily     bumetanide  4 mg Oral Daily     cephALEXin  500 mg Oral Q12H BRITTANI     finasteride  5 mg Oral Daily     FLUoxetine  30 mg Oral Daily     hydrALAZINE  100 mg Oral or Feeding Tube Q8H BRITTANI     insulin aspart  1-7 Units Subcutaneous TID AC     insulin aspart  1-5 Units Subcutaneous At Bedtime     melatonin  3 mg Oral At Bedtime     multivitamin RENAL  1 capsule Oral Daily     omeprazole  20 mg Oral QAM AC     oxymetazoline  2 spray Both Nostrils BID     senna-docusate  2 tablet Oral BID     tamsulosin  0.4 mg Oral Daily

## 2021-03-03 ENCOUNTER — ANESTHESIA (OUTPATIENT)
Dept: CARDIOLOGY | Facility: CLINIC | Age: 66
DRG: 870 | End: 2021-03-03
Payer: MEDICARE

## 2021-03-03 ENCOUNTER — ANESTHESIA EVENT (OUTPATIENT)
Dept: SURGERY | Facility: CLINIC | Age: 66
End: 2021-03-03

## 2021-03-03 ENCOUNTER — ANESTHESIA (OUTPATIENT)
Dept: SURGERY | Facility: CLINIC | Age: 66
End: 2021-03-03

## 2021-03-03 ENCOUNTER — APPOINTMENT (OUTPATIENT)
Dept: CARDIOLOGY | Facility: CLINIC | Age: 66
DRG: 870 | End: 2021-03-03
Attending: NURSE PRACTITIONER
Payer: MEDICARE

## 2021-03-03 ENCOUNTER — ANESTHESIA EVENT (OUTPATIENT)
Dept: CARDIOLOGY | Facility: CLINIC | Age: 66
DRG: 870 | End: 2021-03-03
Payer: MEDICARE

## 2021-03-03 LAB
ALBUMIN SERPL-MCNC: 2.6 G/DL (ref 3.4–5)
ALBUMIN SERPL-MCNC: 2.9 G/DL (ref 3.4–5)
ALP SERPL-CCNC: 122 U/L (ref 40–150)
ALP SERPL-CCNC: 144 U/L (ref 40–150)
ALT SERPL W P-5'-P-CCNC: 33 U/L (ref 0–70)
ALT SERPL W P-5'-P-CCNC: 33 U/L (ref 0–70)
ANION GAP SERPL CALCULATED.3IONS-SCNC: 13 MMOL/L (ref 3–14)
ANION GAP SERPL CALCULATED.3IONS-SCNC: 9 MMOL/L (ref 3–14)
APTT PPP: 40 SEC (ref 22–37)
AST SERPL W P-5'-P-CCNC: 48 U/L (ref 0–45)
AST SERPL W P-5'-P-CCNC: 50 U/L (ref 0–45)
BASOPHILS # BLD AUTO: 0 10E9/L (ref 0–0.2)
BASOPHILS NFR BLD AUTO: 0.6 %
BILIRUB SERPL-MCNC: 0.5 MG/DL (ref 0.2–1.3)
BILIRUB SERPL-MCNC: 0.5 MG/DL (ref 0.2–1.3)
BUN SERPL-MCNC: 46 MG/DL (ref 7–30)
BUN SERPL-MCNC: 91 MG/DL (ref 7–30)
CALCIUM SERPL-MCNC: 8 MG/DL (ref 8.5–10.1)
CALCIUM SERPL-MCNC: 9 MG/DL (ref 8.5–10.1)
CHLORIDE SERPL-SCNC: 93 MMOL/L (ref 94–109)
CHLORIDE SERPL-SCNC: 98 MMOL/L (ref 94–109)
CO2 SERPL-SCNC: 22 MMOL/L (ref 20–32)
CO2 SERPL-SCNC: 24 MMOL/L (ref 20–32)
CREAT SERPL-MCNC: 4.28 MG/DL (ref 0.66–1.25)
CREAT SERPL-MCNC: 7.08 MG/DL (ref 0.66–1.25)
DIFFERENTIAL METHOD BLD: ABNORMAL
EOSINOPHIL # BLD AUTO: 0.1 10E9/L (ref 0–0.7)
EOSINOPHIL NFR BLD AUTO: 2 %
ERYTHROCYTE [DISTWIDTH] IN BLOOD BY AUTOMATED COUNT: 22.9 % (ref 10–15)
GFR SERPL CREATININE-BSD FRML MDRD: 13 ML/MIN/{1.73_M2}
GFR SERPL CREATININE-BSD FRML MDRD: 7 ML/MIN/{1.73_M2}
GLUCOSE BLDC GLUCOMTR-MCNC: 127 MG/DL (ref 70–99)
GLUCOSE BLDC GLUCOMTR-MCNC: 171 MG/DL (ref 70–99)
GLUCOSE SERPL-MCNC: 104 MG/DL (ref 70–99)
GLUCOSE SERPL-MCNC: 162 MG/DL (ref 70–99)
HCT VFR BLD AUTO: 25.4 % (ref 40–53)
HGB BLD-MCNC: 8.1 G/DL (ref 13.3–17.7)
IMM GRANULOCYTES # BLD: 0.1 10E9/L (ref 0–0.4)
IMM GRANULOCYTES NFR BLD: 1.1 %
INR PPP: 2.1 (ref 0.86–1.14)
LDH SERPL L TO P-CCNC: 292 U/L (ref 85–227)
LYMPHOCYTES # BLD AUTO: 0.6 10E9/L (ref 0.8–5.3)
LYMPHOCYTES NFR BLD AUTO: 9 %
MCH RBC QN AUTO: 25.5 PG (ref 26.5–33)
MCHC RBC AUTO-ENTMCNC: 31.9 G/DL (ref 31.5–36.5)
MCV RBC AUTO: 80 FL (ref 78–100)
MONOCYTES # BLD AUTO: 0.9 10E9/L (ref 0–1.3)
MONOCYTES NFR BLD AUTO: 12.3 %
NEUTROPHILS # BLD AUTO: 5.3 10E9/L (ref 1.6–8.3)
NEUTROPHILS NFR BLD AUTO: 75 %
NRBC # BLD AUTO: 0 10*3/UL
NRBC BLD AUTO-RTO: 0 /100
PLATELET # BLD AUTO: 236 10E9/L (ref 150–450)
POTASSIUM SERPL-SCNC: 3.8 MMOL/L (ref 3.4–5.3)
POTASSIUM SERPL-SCNC: 3.8 MMOL/L (ref 3.4–5.3)
PROT SERPL-MCNC: 7 G/DL (ref 6.8–8.8)
PROT SERPL-MCNC: 7.9 G/DL (ref 6.8–8.8)
RBC # BLD AUTO: 3.18 10E12/L (ref 4.4–5.9)
SODIUM SERPL-SCNC: 127 MMOL/L (ref 133–144)
SODIUM SERPL-SCNC: 131 MMOL/L (ref 133–144)
WBC # BLD AUTO: 7 10E9/L (ref 4–11)

## 2021-03-03 PROCEDURE — 93750 INTERROGATION VAD IN PERSON: CPT | Performed by: INTERNAL MEDICINE

## 2021-03-03 PROCEDURE — 93005 ELECTROCARDIOGRAM TRACING: CPT

## 2021-03-03 PROCEDURE — 999N000157 HC STATISTIC RCP TIME EA 10 MIN

## 2021-03-03 PROCEDURE — 85025 COMPLETE CBC W/AUTO DIFF WBC: CPT | Performed by: INTERNAL MEDICINE

## 2021-03-03 PROCEDURE — 99232 SBSQ HOSP IP/OBS MODERATE 35: CPT | Mod: GC | Performed by: INTERNAL MEDICINE

## 2021-03-03 PROCEDURE — 93312 ECHO TRANSESOPHAGEAL: CPT | Mod: 26 | Performed by: INTERNAL MEDICINE

## 2021-03-03 PROCEDURE — 93325 DOPPLER ECHO COLOR FLOW MAPG: CPT

## 2021-03-03 PROCEDURE — 80053 COMPREHEN METABOLIC PANEL: CPT | Performed by: INTERNAL MEDICINE

## 2021-03-03 PROCEDURE — 250N000009 HC RX 250: Performed by: STUDENT IN AN ORGANIZED HEALTH CARE EDUCATION/TRAINING PROGRAM

## 2021-03-03 PROCEDURE — 250N000009 HC RX 250: Performed by: INTERNAL MEDICINE

## 2021-03-03 PROCEDURE — 83615 LACTATE (LD) (LDH) ENZYME: CPT | Performed by: INTERNAL MEDICINE

## 2021-03-03 PROCEDURE — 250N000013 HC RX MED GY IP 250 OP 250 PS 637: Performed by: STUDENT IN AN ORGANIZED HEALTH CARE EDUCATION/TRAINING PROGRAM

## 2021-03-03 PROCEDURE — 258N000003 HC RX IP 258 OP 636: Performed by: STUDENT IN AN ORGANIZED HEALTH CARE EDUCATION/TRAINING PROGRAM

## 2021-03-03 PROCEDURE — 250N000011 HC RX IP 250 OP 636: Performed by: STUDENT IN AN ORGANIZED HEALTH CARE EDUCATION/TRAINING PROGRAM

## 2021-03-03 PROCEDURE — 250N000013 HC RX MED GY IP 250 OP 250 PS 637: Performed by: INTERNAL MEDICINE

## 2021-03-03 PROCEDURE — 93325 DOPPLER ECHO COLOR FLOW MAPG: CPT | Mod: 26 | Performed by: INTERNAL MEDICINE

## 2021-03-03 PROCEDURE — 93320 DOPPLER ECHO COMPLETE: CPT | Mod: 26 | Performed by: INTERNAL MEDICINE

## 2021-03-03 PROCEDURE — 92960 CARDIOVERSION ELECTRIC EXT: CPT | Mod: GC | Performed by: INTERNAL MEDICINE

## 2021-03-03 PROCEDURE — 90937 HEMODIALYSIS REPEATED EVAL: CPT

## 2021-03-03 PROCEDURE — 85610 PROTHROMBIN TIME: CPT | Performed by: INTERNAL MEDICINE

## 2021-03-03 PROCEDURE — 250N000011 HC RX IP 250 OP 636: Performed by: INTERNAL MEDICINE

## 2021-03-03 PROCEDURE — 5A2204Z RESTORATION OF CARDIAC RHYTHM, SINGLE: ICD-10-PCS | Performed by: INTERNAL MEDICINE

## 2021-03-03 PROCEDURE — 85730 THROMBOPLASTIN TIME PARTIAL: CPT | Performed by: INTERNAL MEDICINE

## 2021-03-03 PROCEDURE — 92960 CARDIOVERSION ELECTRIC EXT: CPT

## 2021-03-03 PROCEDURE — 250N000013 HC RX MED GY IP 250 OP 250 PS 637

## 2021-03-03 PROCEDURE — 214N000001 HC R&B CCU UMMC

## 2021-03-03 PROCEDURE — 99232 SBSQ HOSP IP/OBS MODERATE 35: CPT | Mod: 25 | Performed by: INTERNAL MEDICINE

## 2021-03-03 PROCEDURE — 93010 ELECTROCARDIOGRAM REPORT: CPT | Mod: 59 | Performed by: INTERNAL MEDICINE

## 2021-03-03 PROCEDURE — 99152 MOD SED SAME PHYS/QHP 5/>YRS: CPT | Performed by: INTERNAL MEDICINE

## 2021-03-03 PROCEDURE — 999N000011 HC STATISTIC ANESTHESIA CASE

## 2021-03-03 PROCEDURE — 250N000009 HC RX 250: Performed by: NURSE ANESTHETIST, CERTIFIED REGISTERED

## 2021-03-03 PROCEDURE — 999N001017 HC STATISTIC GLUCOSE BY METER IP

## 2021-03-03 RX ORDER — WARFARIN SODIUM 4 MG/1
4 TABLET ORAL
Status: COMPLETED | OUTPATIENT
Start: 2021-03-03 | End: 2021-03-03

## 2021-03-03 RX ORDER — SODIUM CHLORIDE 9 MG/ML
INJECTION, SOLUTION INTRAVENOUS CONTINUOUS PRN
Status: DISCONTINUED | OUTPATIENT
Start: 2021-03-03 | End: 2021-03-03

## 2021-03-03 RX ORDER — NALOXONE HYDROCHLORIDE 0.4 MG/ML
0.2 INJECTION, SOLUTION INTRAMUSCULAR; INTRAVENOUS; SUBCUTANEOUS
Status: DISCONTINUED | OUTPATIENT
Start: 2021-03-03 | End: 2021-03-03

## 2021-03-03 RX ORDER — LIDOCAINE HYDROCHLORIDE 20 MG/ML
15 SOLUTION OROPHARYNGEAL ONCE
Status: COMPLETED | OUTPATIENT
Start: 2021-03-03 | End: 2021-03-03

## 2021-03-03 RX ORDER — NALOXONE HYDROCHLORIDE 0.4 MG/ML
0.4 INJECTION, SOLUTION INTRAMUSCULAR; INTRAVENOUS; SUBCUTANEOUS
Status: DISCONTINUED | OUTPATIENT
Start: 2021-03-03 | End: 2021-03-03

## 2021-03-03 RX ORDER — ACYCLOVIR 200 MG/1
9.5 CAPSULE ORAL
Status: DISCONTINUED | OUTPATIENT
Start: 2021-03-03 | End: 2021-03-16 | Stop reason: HOSPADM

## 2021-03-03 RX ORDER — LIDOCAINE 40 MG/G
CREAM TOPICAL
Status: DISCONTINUED | OUTPATIENT
Start: 2021-03-03 | End: 2021-03-03

## 2021-03-03 RX ORDER — FLUMAZENIL 0.1 MG/ML
0.2 INJECTION, SOLUTION INTRAVENOUS
Status: DISCONTINUED | OUTPATIENT
Start: 2021-03-03 | End: 2021-03-03

## 2021-03-03 RX ORDER — WARFARIN SODIUM 4 MG/1
4 TABLET ORAL
Status: DISCONTINUED | OUTPATIENT
Start: 2021-03-03 | End: 2021-03-03

## 2021-03-03 RX ORDER — FENTANYL CITRATE 50 UG/ML
25 INJECTION, SOLUTION INTRAMUSCULAR; INTRAVENOUS
Status: DISCONTINUED | OUTPATIENT
Start: 2021-03-03 | End: 2021-03-03

## 2021-03-03 RX ORDER — HYDRALAZINE HYDROCHLORIDE 100 MG/1
100 TABLET, FILM COATED ORAL
Status: DISCONTINUED | OUTPATIENT
Start: 2021-03-04 | End: 2021-03-05

## 2021-03-03 RX ADMIN — BUMETANIDE 4 MG: 2 TABLET ORAL at 08:13

## 2021-03-03 RX ADMIN — DOCUSATE SODIUM 50 MG AND SENNOSIDES 8.6 MG 1 TABLET: 8.6; 5 TABLET, FILM COATED ORAL at 19:58

## 2021-03-03 RX ADMIN — SODIUM CHLORIDE 250 ML: 9 INJECTION, SOLUTION INTRAVENOUS at 14:08

## 2021-03-03 RX ADMIN — FENTANYL CITRATE 50 MCG: 50 INJECTION, SOLUTION INTRAMUSCULAR; INTRAVENOUS at 09:10

## 2021-03-03 RX ADMIN — OMEPRAZOLE 20 MG: 20 CAPSULE, DELAYED RELEASE ORAL at 08:13

## 2021-03-03 RX ADMIN — WARFARIN SODIUM 4 MG: 4 TABLET ORAL at 19:58

## 2021-03-03 RX ADMIN — SODIUM CHLORIDE 300 ML: 9 INJECTION, SOLUTION INTRAVENOUS at 14:08

## 2021-03-03 RX ADMIN — DOCUSATE SODIUM 50 MG AND SENNOSIDES 8.6 MG 1 TABLET: 8.6; 5 TABLET, FILM COATED ORAL at 08:14

## 2021-03-03 RX ADMIN — FINASTERIDE 5 MG: 5 TABLET, FILM COATED ORAL at 08:12

## 2021-03-03 RX ADMIN — HYDRALAZINE HYDROCHLORIDE 125 MG: 100 TABLET, FILM COATED ORAL at 19:59

## 2021-03-03 RX ADMIN — CEPHALEXIN 500 MG: 500 CAPSULE ORAL at 19:58

## 2021-03-03 RX ADMIN — LIDOCAINE HYDROCHLORIDE 15 ML: 20 SOLUTION ORAL; TOPICAL at 08:55

## 2021-03-03 RX ADMIN — Medication: at 14:08

## 2021-03-03 RX ADMIN — INSULIN ASPART 1 UNITS: 100 INJECTION, SOLUTION INTRAVENOUS; SUBCUTANEOUS at 14:39

## 2021-03-03 RX ADMIN — Medication 1 CAPSULE: at 08:13

## 2021-03-03 RX ADMIN — FENTANYL CITRATE 25 MCG: 50 INJECTION, SOLUTION INTRAMUSCULAR; INTRAVENOUS at 09:14

## 2021-03-03 RX ADMIN — CEPHALEXIN 500 MG: 500 CAPSULE ORAL at 08:12

## 2021-03-03 RX ADMIN — AMIODARONE HYDROCHLORIDE 200 MG: 200 TABLET ORAL at 08:13

## 2021-03-03 RX ADMIN — ALLOPURINOL 100 MG: 100 TABLET ORAL at 08:13

## 2021-03-03 RX ADMIN — HYDRALAZINE HYDROCHLORIDE 100 MG: 100 TABLET, FILM COATED ORAL at 05:46

## 2021-03-03 RX ADMIN — TAMSULOSIN HYDROCHLORIDE 0.4 MG: 0.4 CAPSULE ORAL at 08:13

## 2021-03-03 RX ADMIN — MIDAZOLAM 0.5 MG: 1 INJECTION INTRAMUSCULAR; INTRAVENOUS at 09:15

## 2021-03-03 RX ADMIN — OXYMETAZOLINE HYDROCHLORIDE 2 SPRAY: 0.05 SPRAY NASAL at 08:15

## 2021-03-03 RX ADMIN — ASPIRIN 81 MG CHEWABLE TABLET 81 MG: 81 TABLET CHEWABLE at 08:14

## 2021-03-03 RX ADMIN — FLUOXETINE 30 MG: 20 CAPSULE ORAL at 08:13

## 2021-03-03 RX ADMIN — TOPICAL ANESTHETIC 0.5 ML: 200 SPRAY DENTAL; PERIODONTAL at 08:55

## 2021-03-03 RX ADMIN — MIDAZOLAM 1 MG: 1 INJECTION INTRAMUSCULAR; INTRAVENOUS at 09:09

## 2021-03-03 RX ADMIN — DOBUTAMINE HYDROCHLORIDE 3 MCG/KG/MIN: 200 INJECTION INTRAVENOUS at 05:14

## 2021-03-03 RX ADMIN — METHOHEXITAL SODIUM 50 MG: 500 INJECTION, POWDER, LYOPHILIZED, FOR SOLUTION INTRAMUSCULAR; INTRAVENOUS; RECTAL at 09:48

## 2021-03-03 RX ADMIN — MELATONIN TAB 3 MG 3 MG: 3 TAB at 19:58

## 2021-03-03 RX ADMIN — ZOLPIDEM TARTRATE 5 MG: 5 TABLET, FILM COATED ORAL at 19:59

## 2021-03-03 ASSESSMENT — ACTIVITIES OF DAILY LIVING (ADL)
ADLS_ACUITY_SCORE: 15
ADLS_ACUITY_SCORE: 17
ADLS_ACUITY_SCORE: 17

## 2021-03-03 ASSESSMENT — ENCOUNTER SYMPTOMS
DYSRHYTHMIAS: 1
DYSRHYTHMIAS: 1

## 2021-03-03 NOTE — PROGRESS NOTES
HEMODIALYSIS TREATMENT NOTE    Date: 3/3/2021  Time: 5:17 PM    Data:  Pre Wt: 96.3 kg (212 lb 4.9 oz)   Desired Wt: 94.3 kg   Post Wt: 94.3 kg (207 lb 14.3 oz)(estimate)  Weight change: 2 kg  Ultrafiltration - Post Run Net Total Removed (mL): 2000 mL  Vascular Access Status: CVC  patent  Dialyzer Rinse: Clear  Total Blood Volume Processed: 40.75 L   Total Dialysis (Treatment) Time: 3hrs   Dialysate Bath: K 3, Ca 3  Heparin: None    Lab:   No    Interventions & Assessment:  3hr TX, 2L removed.  PT tolerated tx well with no complaints & remained asymptomatic.  TX uneventful.  CVC saline locked with clearguards in place. Handoff report given to PCN.     Plan:    Next tx per renal team.

## 2021-03-03 NOTE — PROGRESS NOTES
HEMODIALYSIS TREATMENT NOTE    Date: 3/2/2021  Time: 8:47 PM    Data:  Pre Wt: 98.6 kg (217 lb 6 oz)   Desired Wt: 96.1 kg   Ultrafiltration - Post Run Net Total Removed (mL): 2500 mL  Vascular Access Status:RIJ Tunneled CVC Double Lumen: patent  Dialyzer Rinse: Streaked, Light  Total Blood Volume Processed: 0 L   Total Dialysis (Treatment) Time: 2 hrs  Dialysate Bath: N/A  Heparin: None    Lab:   No    Assessment:  Patent RIj Tunneled CVC HD lines.  Edema on Lower extrimities (++/++)  BERNARDA on HD.  LAVD: HM III on  IV: Dobutamin gtt at 3 mg/kg/mins    Interventions:  Patient dialyzed for 2 hrs via RIJ Tunneled CVC HD lines. Reached BFR to 350 ml/mins. Kept MAP above 65 mmHg during run. Stable and Tolerable for 2 hrs UF run with 2.5 kg off. Finished HD with rinse back. CVC NS locked with clear guards caps.Agve new CVC dressing with bio-patach.     Plan:    Next run per renal team.

## 2021-03-03 NOTE — PROGRESS NOTES
.    Nephrology Progress Note  03/03/2021         Assessment & Recommendations:   66 yo M with PMHx  NICM  s/p HM III , VT, severe MR, atrial fibrillation on warfarin, hypertension, CKD IV, DMII, HIT presented to OSH with fevers and cough in the setting of syncopal episode transferred to [facility] for further cares. Hospitalization complicated by Afib with RVR with hypotension and intubation with upgrade of cares to the ICU. Found to be in septic shock 2/2 legionella pneumonia with possible cardiogenic shock. Nephrology was consulted for evaluation and management of anuric BERNARDA. Started on RRT 2/16.     Dialysis dependent non-oligouric BERNARDA 2/2 ischemic ATN  Baseline Cr last yr s/p LVAD placement was around 1. On admission ~2.3 and doubled within 24 hours with rapid decrease in UOP. UA unimpressive with baseline proteinuria and new granular casts. Initiated on RRT 2/16. Monitoring for renal recovery and now having improved UOP, but lagging with clearance.   - HD today for clearance.   - Agree with bumex 4 mg BID  - Avoid nephrotoxins as able  - Renally dose meds  - Monitor I/Os    Volume status  Euvolemic to slight hypervolemia  -Per Cards, plan to remove ~1L with dialysis today     Electrolytes   Mild Hyponatremia - CTM    Acid/base - stable   Anemia - Hb 8.1 this AM, received 1u pRBCs on 2/28. Had intermittent epistaxis for last few days that has resolved     Recommendations were communicated to primary team via this note     Seen and discussed with Dr. Hunter Barry MD  [phone]  I have seen and examined the patient and reviewed all current labs and findings. I concur with the assessment and plan as it is outlined. Any changes to the note made by me are in bold. Char Harrison MD MS FNKF    Interval History :   Nursing and provider notes from last 24 hours reviewed.    No major overnight events. Had a CHAMP this morning.    Tolerating oral intake. Denies SOB, chest pain.    Urine output improving - 1.8L from  the last 24 hrs    Review of Systems:   4pt ROS negative except as above in HPI    Physical Exam:   I/O last 3 completed shifts:  In: 876 [P.O.:660; I.V.:216]  Out: 4325 [Urine:1825; Other:2500]   BP 94/78 (BP Location: Left arm)   Pulse 78   Temp 97.7  F (36.5  C) (Oral)   Resp 18   Wt 96.3 kg (212 lb 6.4 oz)   SpO2 95%   BMI 33.27 kg/m       General : Pt awake, not in acute distress   Lungs : No increased WOB. No appreciable crackles on the lung fields  Cardiac : LVAD in place  Abdomen : Soft/ND/NT  LE : trace edema on the LE, worsened from yesterday  Dialysis Access : tunneled RIJ catheter    Labs:   All labs reviewed by me  Electrolytes/Renal -   Recent Labs   Lab Test 03/03/21 0455 03/02/21  1750 03/02/21  0401 02/22/21  1539 02/22/21  1539 02/22/21  0353 02/22/21  0353 02/21/21  1617 02/21/21  1617   * 127* 127*   < >  --    < > 136   < >  --    POTASSIUM 3.8 4.1 3.9   < >  --    < > 4.7   < >  --    CHLORIDE 93* 92* 93*   < >  --    < > 106   < >  --    CO2 22 23 23   < >  --    < > 24   < >  --    BUN 91* 83* 73*   < >  --    < > 30   < >  --    CR 7.08* 6.79* 6.41*   < >  --    < > 1.94*   < >  --    * 184* 126*   < >  --    < > 109*   < >  --    CALOS 8.0* 8.0* 7.9*   < >  --    < > 8.1*   < >  --    MAG  --   --  1.7  --  2.6*  --  2.5*  --  2.6*   PHOS  --   --   --   --  5.2*  --  4.4  --  4.5    < > = values in this interval not displayed.       CBC -   Recent Labs   Lab Test 03/03/21 0455 03/02/21  0401 03/01/21  0545   WBC 7.0 7.3 7.7   HGB 8.1* 9.2* 8.1*    218 226       LFTs -   Recent Labs   Lab Test 03/03/21  0455 03/02/21  1750 03/02/21  0401   ALKPHOS 122 134 136   BILITOTAL 0.5 0.5 0.5   ALT 33 36 36   AST 48* 57* 57*   PROTTOTAL 7.0 7.4 7.0   ALBUMIN 2.6* 2.8* 2.6*       Iron Panel -   Recent Labs   Lab Test 02/27/21  0415 11/16/20  0616 02/08/20  0408   IRON 28* 16* 25*   IRONSAT 9* 6* 8*   HEAVENLY 276 75 189         Imaging:    Current Medications:    sodium chloride  0.9%  250 mL Intravenous Once in dialysis     sodium chloride 0.9%  300 mL Hemodialysis Machine Once     sodium chloride (PF) 0.9%  3 mL Intracatheter During Hemodialysis (from stock)     sodium chloride (PF) 0.9%  3 mL Intracatheter During Hemodialysis (from stock)     allopurinol  100 mg Oral or Feeding Tube Daily     amiodarone  200 mg Oral Daily     aspirin  81 mg Oral Daily     bumetanide  4 mg Oral Daily     cephALEXin  500 mg Oral Q12H BRITTANI     finasteride  5 mg Oral Daily     FLUoxetine  30 mg Oral Daily     gelatin absorbable  1 each Topical During Hemodialysis (from stock)     hydrALAZINE  100 mg Oral or Feeding Tube Q8H BRITTANI     insulin aspart  1-7 Units Subcutaneous TID AC     insulin aspart  1-5 Units Subcutaneous At Bedtime     melatonin  3 mg Oral At Bedtime     multivitamin RENAL  1 capsule Oral Daily     - MEDICATION INSTRUCTIONS -   Does not apply Once     omeprazole  20 mg Oral QAM AC     oxymetazoline  2 spray Both Nostrils BID     senna-docusate  2 tablet Oral BID     sodium chloride (PF)  3 mL Intracatheter Q8H     sodium chloride (PF)  9 mL Intracatheter During Hemodialysis (from stock)     sodium chloride (PF)  9 mL Intracatheter During Hemodialysis (from stock)     tamsulosin  0.4 mg Oral Daily       dextrose       dextrose Stopped (02/17/21 1600)     DOBUTamine 3 mcg/kg/min (03/03/21 0514)     sodium chloride       Warfarin Therapy Reminder       Jese Barry MD

## 2021-03-03 NOTE — PROCEDURES
[facility]    Procedure: Cardioversion    Date/Time: 3/3/2021 10:40 AM  Performed by: Maciel House MD  Authorized by: Candis Zapata APRN [facility]     UNIVERSAL PROTOCOL   Site Marked: Yes  Prior Images Obtained and Reviewed:  Yes  Required items: Required blood products, implants, devices and special equipment available    Patient identity confirmed:  Arm band and provided demographic data  Patient was reevaluated immediately before administering moderate or deep sedation or anesthesia  Confirmation Checklist:  Patient's identity using two indicators  Time out: Immediately prior to the procedure a time out was called    Universal Protocol: the Joint Commission Universal Protocol was followed           ANESTHESIA  Anesthesia was administered and monitored by anesthesiology.  See anesthesia documentation for details.    SEDATION    Patient Sedated: Yes    Sedation:  See MAR for details  Vital signs: Vital signs monitored during sedation      PROCEDURE DETAILS  Cardioversion basis: elective  Pre-procedure rhythm: atrial flutter  Patient position: patient was placed in a supine position  Chest area: chest area exposed  Electrodes: pads  Electrodes placed: anterior-posterior  Number of attempts: 1    Details of Attempts:  200 J DCCV successful conversion to NSR  Post-procedure rhythm: normal sinus rhythm  Complications: no complications    PROCEDURE   Patient Tolerance:  Patient tolerated the procedure well with no immediate complications          I appreciated the opportunity to see and assess Mr Patel and direct the procedure described above with CV Fellow Dr House. The above note summarizes my findings and current recommendations. Please do not hesitate to contact me if you have any questions or concerns.    Jamar Butler MD [facility]   Pager [phone]  Office [phone]

## 2021-03-03 NOTE — PROGRESS NOTES
[facility]    Cardiology Progress Note- Cards 2        Date of Admission:  2/15/2021     Today's Plan  - Continue low dose dobutamine   - increase hydralazine 100/100/125  - dialysis likely today per nephrology   - Bumetanide prn on non-dialysis days  - Continue antibiotics:   -PO cefalexin 500mg Q6h (renally dosed equivalent) for 14 days followed by 500mg bid (renally dosed equivalent)  - ongoing PT/OT  - continue warfarin  - Social work arranging dialysis and placement    Assessment & Plan: SL   Eliseo Tanner is a 65 year old male with PMHx relevant for NICM with LVEF 15-20%, NYHA III s/p HM III 2/18/2020 (post op complicated by retrosternal hematoma with bleeding in the lungs), s/p ICD placed on 3/16/2020, h/o MSSA driveline infection and bacteremia 11/5/2020 on prophylactic cephalexin, h/o VT on amiodarone, moderate nonobstructive CAD, severe MR, atrial fibrillation on warfarin, hypertension, ABHINAV requiring CPAP, CKD IV, DMII, HLP,  h/o HIT who presented to the Cardiovascular Unit (4E Cardiac ICU) with mixed cardiogenic and distributive shock withan intermittent wide complex tachycardia. Initially requiring vasopressors and inotropes, intubated for hypoxemia. Treated for legionella pneumonia. Stabilized on low dose dobutamine off of pressors. Extubated. With persistent oliguric renal failure.       Cardiovascular:     #Mixed Cardiogenic and Septic Shock - Improved  #Multiorgan Failure - Improved  Presented from [facility] with subacute development of shortness of breath, dyspnea on exertion, dizziness/lightheadedness with near syncopal event found to be febrile with intermittent wide complex tachycardia. Recent COVID19 moderna vaccination. CT chest showing bilateral infiltrates. Community acquired pneumonia vs. Possible recurrent of MSSA bacteremia WBC 24.5, Procal 7.95, , Lactacte 6.9. Completed course of IV  antibiotics.     Mildly elevated filling pressures on presentation CVP 18, PA 35/20, PCWP 11. Likely some component of cardiogenic shock. LVAD parameters relatively stable despite markedly elevated LDH 8,531. Euvolemic on exam. Worsening renal function, Cr 4.62, up-trending LFT's/ INR. Legionella antigen positive. Weaned off of vasopressor and maintained on dobutamine. Acute hepatic injury which is improving. Improved renal function but continues to Require dialysis. Shock resolved.      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 Bhatti (ID clinic)   # Troponin elevation (likely demand ischemia)  # Essential Hypertension  # Hyperlipidemia        Patient with long standing history of NICM previously implanted LVAD (HM III) on 02/18/20 due to worsening functional status and systolic ventricular dysfunction (further complicated by RV dysfunction by VAD hemodynamic compensatory mechanism, VT in ICU now on Amiodarone and Atrial Fibrillation with AVR requiring DCCV on 02/28/20).      Elevated cardiac biomarkers on presentation, SGC with only mildly elevated filling pressures. Euvolemic on exam. Troponin elevation likely related to demand ischemia with no concerns for ACS. Most recent VAD interrogation without any significant Flow-Alarms     LDH 8,531. Initial concern for LVAD thrombus. However given relatively stable LVAD parameters, degree of LDH elevation is out of proportion. Will continue to monitor for LVAD alarms.      - continue dobutamine for renal recovery  - Held ACE-I/ARB/ARNi: Lisinopril; due to acute renal failure  - No BB;  deferred 2/2 shock  - Aldosterone antagonist: deferred while other medical therapy is optimized  - SCD prophylaxis: ICD  - Fluid status : hypervolemic, IV diuretic today, further management per dialysis  - MAP Goal: 65-90  - On Warfarin for HM-III INR Goal 2-3  - Continue Hydralazine  - Continue ASA 81 mg per oral daily       # Wide complex tachycardia. Atrial Flutter vs Afib vs Ventricular Tachycardia  # History of Atrial Fibrillation s/p DCCV/history of Atrial Flutter       Wide complex tachycardia HR 140s on presentation in the setting of febrile illness and likely pneumonia. Did not initially respond to adenosine. Concern for VT. Intermittently back into HR 60s with underlying rhythm of atrial flutter 3:1 conduction block. Per EP can not rule out VT, may be dual tachycardias.       - continue home amiodarone  - Ensure K+ >4.0 mEq/L and Mg+2 >2.0  - continue warfarin  - On cardiac telemetry  - EP on board     Pulmonary  #Legionella Pneumonia - resolved  Hypoxemic in the setting of mixed cardiogenic septic shock requiring emergent intubation. O2 requirements quickly improved. S/p extubation on room air.     Infectious disease     #Sepsis  #Legionella Pneumonia  #Bilateral pulmonary infiltrates  CT showing bilateral diffuse patchy consolidations concerning for infectious process. Low suspicion for recurrent driveline or possible hardware infection (history of MSSA Chronic Driveline Infection). Urine without pyuria. Urine legionella ag positive.   - f/u blood cultures  - continue Abx as per ID              -Cefazolin (2/18/21 - 3/1/21) followed by PO cefalexin              Azithromycin (2/15/21 - 2/25/21)     Renal:  # Acute on Chronic CKD stage IV likely 2/2 Septic Shock   s/p L TDC receiving IHD  - IHD per nephrology  - Daily Weight's  - Strict I/O's  - Daily BMP's  - Avoid any additional nephrotoxicity   - intermittent bumetanide on non-dialysis days     GI  # Shock Liver   # Congestive Hepatopathy -  resolved  Hepatoccelular pattern of liver injury  > 3,496, AST 1,441 > 8,490 likely 2/2 to septic shock. INR downtrended s/p 3mg IV vit K. RUQ US without portal vein thrombus. LFTs downtrended nicely. No evidence of synthetic dysfunction.      Hematological   # Chronic Microcytic/Hypochromic Anemia (likely related to iron deficiency)  # Coagulopathy related to sepsis   # IgG Kappa monoclonal Gammopathy of undetermined significance  - Monitor Hgb (baseline 8-9g/dL)  - transfuse for Hb < 7.0   - Patient follows with [facility] (Dr. Ibarra)     # Previous Concern For HIT  On previous hospitalization for LVAD placement (02/06/20-03/20/20), concern for HIT. Platelets 250K --> ~ 90K wuth documented history of exposure to unfractionate heparin products at OSH prior to his installation at the [facility].     At that time, patient underwent two HIT panel tests (first one was negative and second antibody test was positive). No known thrombosis events. DAVINA antibody was negative raising question about validity of diagnosis . Per hematology at the time (Dr. James), no other cause for isolated thrombocytopenia. Immediate recovery of plts following d/c of heparin. 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      - avoiding heparin products  - continue warfarin     Endocrine  # Type II DM     > Last Hgb A1C: 6.8 (01/06/21)     - Continue PTA Insulin Basaglar 10 Units Aq at bedtime  - On Medium sliding scale insulin  - Oral Hypoglycemia Protocol      Neurologic:  # Disorientation/Toxic Metabolic Encephalopathy - Resolved  # Chronic Intracranial Small Vessel Disease        Patient reported some lightheadedness/dizziness and disorientation the days prior to hospitalization while at home. However, no reports of LOC, seizure activity, focal deficits, or findings for acute intracranial pathology on most recent OSH CT head w/o contrast (02/15/21). Mental status improved, s/p extubation        DVT  Prophylaxis: therapeutic warfarin  Guevara Catheter: not present  Code Status: Full Code  Fluids: no IVFs  Lines: RIJ TDC (2/23)    Disposition Plan   Pending establishment of long-term dialysis needs      Entered: Daniel Fleming MD 03/03/2021, 2:35 PM       The patient's care was discussed with the Attending Physician, Dr. Jiang.    Daniel Fleming MD   Cardiology Fellow  [address]  Please see sign in/sign out for up to date coverage information  ______________________________________________________________________    Interval History   No complaints. Tolerated ultrafiltration well. Feels legs improved but still swollen. No shortness of breath, palpitations, fevers or chills.     Data reviewed today: I reviewed all medications, new labs and imaging results over the last 24 hours.    Physical Exam   Vital Signs: Temp: 98.6  F (37  C) Temp src: Oral BP: 113/68 Pulse: 79   Resp: 16 SpO2: 99 % O2 Device: None (Room air) Oxygen Delivery: 5 LPM  Weight: 212 lbs 6.4 oz   Gen: awake and in NAD  HEENT: gauze in Left nares, RIJ TDC in place. ~10cm JVP  Heart: LVAD hum.   Lungs: Bilateral crackles. No wheezes  GI: Soft, nontender, nondistended.   Extremities: WWP, 1+ R>L edema  Neuro: grossly non focal  Skin: no rashes.    Data   Recent Labs   Lab 03/03/21  0455 03/02/21  1750 03/02/21  0401 03/01/21  0545 03/01/21  0545   WBC 7.0  --  7.3  --  7.7   HGB 8.1*  --  9.2*  --  8.1*   MCV 80  --  82  --  80     --  218  --  226   INR 2.10*  --  1.87*  --  2.08*   * 127* 127*   < > 132*   POTASSIUM 3.8 4.1 3.9   < > 4.0   CHLORIDE 93* 92* 93*   < > 97   CO2 22 23 23   < > 25   BUN 91* 83* 73*   < > 56*   CR 7.08* 6.79* 6.41*   < > 5.13*   ANIONGAP 13 12 11   < > 10   CALOS 8.0* 8.0* 7.9*   < > 8.0*   * 184* 126*   < > 104*   ALBUMIN 2.6* 2.8* 2.6*   < > 2.5*   PROTTOTAL 7.0 7.4 7.0   < > 6.4*   BILITOTAL 0.5 0.5 0.5   < > 1.4*   ALKPHOS 122 134 136    < > 128   ALT 33 36 36   < > 38   AST 48* 57* 57*   < > 59*    < > = values in this interval not displayed.     Recent Results (from the past 24 hour(s))   Transesophageal Echocardiogram    Narrative    [id number]  [facility]  [id number]  [id number]^MANDEEP MACARIO^COLE^AGUSTIN           [facility]  Echocardiography Laboratory  [address] [place] [address]        Name: WOLFGANG LEACH  MRN: [id number]  : 1955  Study Date: 2021 08:54 AM  Age: 65 yrs  Gender: Male  Patient Location: [facility]  Reason For Study: Afib  Ordering Physician: COLE DARLING  Referring Physician: SYSTEM, PROVIDER NOT IN  Performed By: Ambrosio Saravia MD     BSA: 2.1 m2  Height: 67 in  Weight: 218 lb  HR: 106  BP: 119/45 mmHg  _____________________________________________________________________________  __     Interpretation Summary  The left atrial appendage is normal. It is free of spontaneous echo contrast  and thrombus.  Normal LVAD inflow doppler.  AoV opens partially with each beat.  Mild to moderate aortic insufficiency is present.  Mild to moderate mitral insufficiency is present.  No pericardial effusion is present.  Global right ventricular function is severely reduced.  No change from prior.     _____________________________________________________________________________  __        Procedure  The procedure was performed in the Echo Lab. Informed consent for  Transesophegeal echo obtained. CHAMP Probe #61 was used during the procedure.  Patient was sedated using Fentanyl 75 mcg. Patient was sedated using Versed  1.5 mg. The heart rate, respiratory rate, oxygen saturations, blood pressure,  and response to care were monitored throughout the procedure with the  assistance of the nurse. I determined this patient to be an appropriate  candidate for the planned sedation and procedure and have reassessed the  patient immediately prior to sedation and procedure. Total sedation time:  18  minutes of continuous bedside 1:1 monitoring. The Transducer was inserted  without difficulty . The patient tolerated the procedure well. Complications  None.     Left Ventricle  Mild left ventricular dilation is present. Severely (EF 10-20%) reduced left  ventricular function is present. Severe diffuse hypokinesis is present. Left  ventricular wall thickness is normal.     Right Ventricle  Moderate to severe right ventricular dilation is present. Global right  ventricular function is severely reduced. A pacemaker lead is noted in the  right ventricle.     Atria  The left atrial appendage is normal. It is free of spontaneous echo contrast  and thrombus. Severe biatrial enlargement is present. The atrial septum is  intact as assessed by color Doppler .        Mitral Valve  Mild to moderate mitral insufficiency is present.     Aortic Valve  The aortic valve is tricuspid. Mild aortic valve sclerosis is present. Mild to  moderate aortic insufficiency is present.     Tricuspid Valve  Mild tricuspid insufficiency is present.     Pulmonic Valve  Trace to mild pulmonic insufficiency is present.     Vessels  The aorta root is normal.     Pericardium  No pericardial effusion is present.        _____________________________________________________________________________  __                    Report approved by: Graciela Dubose 03/03/2021 11:31 AM              _____________________________________________________________________________  __      Cardioversion    [facility]    Jamar Butler MD     3/3/2021  1:26 PM  [facility]    Procedure: Cardioversion    Date/Time: 3/3/2021 10:40 AM  Performed by: Maciel House MD  Authorized by: Candis Zapata APRN [facility]     UNIVERSAL PROTOCOL   Site Marked: Yes  Prior Images Obtained and Reviewed:  Yes  Required items: Required blood products, implants, devices and special   equipment available    Patient identity  confirmed:  Arm band and provided demographic data  Patient was reevaluated immediately before administering moderate or deep   sedation or anesthesia  Confirmation Checklist:  Patient's identity using two indicators  Time out: Immediately prior to the procedure a time out was called    Universal Protocol: the Joint Commission Universal Protocol was followed           ANESTHESIA  Anesthesia was administered and monitored by anesthesiology.  See   anesthesia documentation for details.    SEDATION    Patient Sedated: Yes    Sedation:  See MAR for details  Vital signs: Vital signs monitored during sedation      PROCEDURE DETAILS  Cardioversion basis: elective  Pre-procedure rhythm: atrial flutter  Patient position: patient was placed in a supine position  Chest area: chest area exposed  Electrodes: pads  Electrodes placed: anterior-posterior  Number of attempts: 1    Details of Attempts:  200 J DCCV successful conversion to NSR  Post-procedure rhythm: normal sinus rhythm  Complications: no complications    PROCEDURE   Patient Tolerance:  Patient tolerated the procedure well with no immediate   complications         Medications     dextrose       dextrose Stopped (02/17/21 1600)     DOBUTamine 3 mcg/kg/min (03/03/21 0514)     sodium chloride       Warfarin Therapy Reminder         sodium chloride (PF) 0.9%  3 mL Intracatheter During Hemodialysis (from stock)     sodium chloride (PF) 0.9%  3 mL Intracatheter During Hemodialysis (from stock)     allopurinol  100 mg Oral or Feeding Tube Daily     amiodarone  200 mg Oral Daily     aspirin  81 mg Oral Daily     bumetanide  4 mg Oral Daily     cephALEXin  500 mg Oral Q12H BRITTANI     finasteride  5 mg Oral Daily     FLUoxetine  30 mg Oral Daily     gelatin absorbable  1 each Topical During Hemodialysis (from stock)     hydrALAZINE  100 mg Oral or Feeding Tube Q8H BRITTANI     insulin aspart  1-7 Units Subcutaneous TID AC     insulin aspart  1-5 Units Subcutaneous At Bedtime      melatonin  3 mg Oral At Bedtime     multivitamin RENAL  1 capsule Oral Daily     omeprazole  20 mg Oral QAM AC     oxymetazoline  2 spray Both Nostrils BID     senna-docusate  2 tablet Oral BID     sodium chloride (PF)  3 mL Intracatheter Q8H     sodium chloride (PF)  9 mL Intracatheter During Hemodialysis (from stock)     sodium chloride (PF)  9 mL Intracatheter During Hemodialysis (from stock)     tamsulosin  0.4 mg Oral Daily     warfarin ANTICOAGULANT  4 mg Oral ONCE at 18:00

## 2021-03-03 NOTE — PROGRESS NOTES
"CLINICAL NUTRITION SERVICES - REASSESSMENT NOTE     Nutrition Prescription    RECOMMENDATIONS FOR MDs/PROVIDERS TO ORDER:  None at this time.    Malnutrition Status:    Patient does not meet two of the established criteria necessary for diagnosing malnutrition but is at risk for malnutrition    Recommendations already ordered by Registered Dietitian (RD):  Modified oral supplement order    Future/Additional Recommendations:  1. Continue current diet, as ordered. Monitor potential need for a K+ or phos restrictions. Diet restrictions may be added as a \"Combination Diet.\"   2. Order a phos binder if needed. Check phos lab. Phos of 5.2 on 2/22/21. May need to modify Boost Plus to Boost Glucose Control if phos remains elevated.   3. Continue nephrocaps, as ordered, since pt is on dialysis.   4. Monitor BG control. DM2. Hgb A1c of 6.8 on 1/6/21 and BG was 104 on 3//21.   5. Monitor iron status.     EVALUATION OF THE PROGRESS TOWARD GOALS   Diet: NPO currently for cardioversion. Pt ordered to receive Boost Plus, strawberry at 14:00 and vanilla at HS.   Intake: Per nursing flowsheets (% intake), pt consuming 100% of meals with a good appetite with exception of 0% on 2/28. Pt not in his room (pt at cardioversion). Per discussion with pt's wife, pt is eating more than a week ago. Per discussion with pt's wife, pt likes the vanilla and strawberry oral supplements (but dislikes the chocolate). He likes these oral supplements cold and does not want to \"bother\" nursing staff for ice if oral supplements do not arrive cold. [employer] (meal ordering system) indicates food/beverages sent 2/28-3/2 totaled 1570 kcals and 83 g protein daily on average. This does not meet estimated needs of 8480-0188 kcals/day (30-35 kcals/kg) and  grams protein/day (1.2-1.8 grams of pro/kg). Pt is ordering two to three meals daily. Decreased appetite, but improving.     NEW FINDINGS   GI: Pt having zero to one stool per day. Formed, brown, and " "soft stools. Last stool on 3/1.   [facility] 3/2: \"No wounds present, some minor fissures in intact calluses. Initial assessment. [facility] Nurse follow-up plan:signing off\"   Wt Hx: 88.7 kg (4/2/20), 93 kg (5/19/20), 95.3 kg (11/18/20), 96.2 kg (12/17/20), 94.6 kg (1/8/21), 100.3 kg (2/16/21, admit), 95.3 kg (2/24), 96.3 kg (3/3) - No significant/severe wt loss. Fluid status changes, diuresing, and now on dialysis.      MALNUTRITION  % Intake: < 75% for >/= 1 month (non-severe)  % Weight Loss: Difficult to assess wt changes with changes in fluid status, diuresis, and dialysis  Subcutaneous Fat Loss: None observed per prior nutrition note  Muscle Loss: None observed per prior nutrition note  Fluid Accumulation/Edema: Does not meet criteria  Malnutrition Diagnosis: Patient does not meet two of the established criteria necessary for diagnosing malnutrition but is at risk for malnutrition    Previous Goals   Patient to consume % of nutritionally adequate meal trays TID, or the equivalent with supplements/snacks.  Evaluation: Not quite meeting.     Previous Nutrition Diagnosis  Inadequate oral intake related to decreased appetite as evidenced by report of pt consuming ~75% of his usual oral intake currently.     Evaluation: Unresolved. Updated below.     CURRENT NUTRITION DIAGNOSIS  Inadequate oral intake related to decreased appetite but improving as evidenced by [employer] (meal ordering system) indicates food/beverages sent 2/28-3/2 totaled 1570 kcals and 83 g protein daily on average which does not meet estimated needs of 3341-0314 kcals/day (30-35 kcals/kg) and  grams protein/day (1.2-1.8 grams of pro/kg).      INTERVENTIONS  Implementation  Medical food supplement therapy: Modified oral supplement order to send a cup of ice with pt's oral supplements.    Goals  Patient to consume % of nutritionally adequate meal trays TID, or the equivalent with supplements/snacks.    Monitoring/Evaluation  Progress toward " goals will be monitored and evaluated per protocol.     Nutrition will continue to follow.     Abby Woods MS, RD, LD, [facility]   6C Pgr: [phone]

## 2021-03-03 NOTE — PLAN OF CARE
OT 6C. Cancel. Pt off unit getting cardioversion this AM and at dialysis this PM. Will reschedule per POC.

## 2021-03-03 NOTE — ANESTHESIA CARE TRANSFER NOTE
Patient: Eliseo Tanner    * No procedures listed *    Diagnosis: * No pre-op diagnosis entered *  Diagnosis Additional Information: No value filed.    Anesthesia Type:   No value filed.     Note:    Oropharynx: spontaneously breathing  Level of Consciousness: awake  Oxygen Supplementation: nasal cannula  Level of Supplemental Oxygen (L/min / FiO2): 4  Independent Airway: airway patency satisfactory and stable  Dentition: dentition unchanged  Vital Signs Stable: post-procedure vital signs reviewed and stable  Report to RN Given: handoff report given  Patient transferred to: Phase II (Echo lab)    Handoff Report: Identifed the Patient, Identified the Reponsible Provider, Reviewed the pertinent medical history, Discussed the surgical course, Reviewed Intra-OP anesthesia mangement and issues during anesthesia, Set expectations for post-procedure period and Allowed opportunity for questions and acknowledgement of understanding      Vitals: (Last set prior to Anesthesia Care Transfer)  CRNA VITALS  3/3/2021 0933 - 3/3/2021 1003      3/3/2021             NIBP:  95/61    Pulse:  80    Resp Rate (set):  16    EKG:  V Paced        Electronically Signed By: JUAN Ambrocio CRNA  March 3, 2021  10:03 AM

## 2021-03-03 NOTE — ANESTHESIA POSTPROCEDURE EVALUATION
Patient: Eliseo Tanner    * No procedures listed *    Diagnosis:* No pre-op diagnosis entered *  Diagnosis Additional Information: No value filed.    Anesthesia Type:  General    Note:  Disposition: Outpatient   Postop Pain Control: Uneventful            Sign Out: Well controlled pain   PONV: No   Neuro/Psych: Uneventful            Sign Out: Acceptable/Baseline neuro status   Airway/Respiratory: Uneventful            Sign Out: Acceptable/Baseline resp. status   CV/Hemodynamics: Uneventful            Sign Out: Acceptable CV status   Other NRE: NONE   DID A NON-ROUTINE EVENT OCCUR? No         Last vitals:  Vitals:    03/03/21 0949 03/03/21 0952 03/03/21 0955   BP: 100/72 (!) 89/82 94/78   Pulse: 105 79 78   Resp: 18 18 18   Temp:      SpO2: 98% 96% 95%       Last vitals prior to Anesthesia Care Transfer:      Electronically Signed By: Ravin Truong MD  March 3, 2021  12:07 PM

## 2021-03-03 NOTE — PROGRESS NOTES
Eliseo Tanner is a 65 year old male patient.  No diagnosis found.  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)      LVAD (left ventricular assist device) present (H)      No current outpatient medications on file.     Allergies   Allergen Reactions     Heparin      HIT screen positive 2/14/20, reflex DAVINA negative; however heme recommended treating as if positive  HIT screen negative 2/11/20     Oxycodone Itching and Other (See Comments)     Chlorhexidine Rash     Active Problems:    Tachyarrhythmia    Acute kidney injury (BERNARDA) with acute tubular necrosis (ATN) (H)    Blood pressure 94/78, pulse 78, temperature 97.7  F (36.5  C), temperature source Oral, resp. rate 18, weight 96.3 kg (212 lb 6.4 oz), SpO2 95 %.    Subjective  Objective  Assessment & Plan    Maciel House MD  3/3/2021

## 2021-03-03 NOTE — ANESTHESIA PREPROCEDURE EVALUATION
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)      LVAD (left ventricular assist device) present (H)       Past Surgical History:   Procedure Laterality Date     ANESTHESIA CARDIOVERSION N/A 2020    Procedure: ANESTHESIA, FOR CARDIOVERSION;  Surgeon: GENERIC ANESTHESIA PROVIDER;  Location: [facility]     CV CENTRAL VENOUS CATHETER PLACEMENT N/A 2020    Procedure: Central Venous Catheter Placement;  Surgeon: Chente Moss MD;  Location:  [facility] CATH [facility]     CV INTRA-AORTIC BALLOON PUMP INSERTION N/A 2020    Procedure: Intra-Aortic Balloon Pump Insertion;  Surgeon: Jose Baldwin MD;  Location:  [facility] CATH [facility]     CV INTRA-AORTIC BALLOON PUMP INSERTION N/A 2020    Procedure: Intra-Aortic Balloon Pump Insertion;  Surgeon: Chente Moss MD;  Location:  [facility] CARDI[facility] CATH LAB     CV RIGHT HEART CATH MEASUREMENTS RECORDED N/A 2020    Procedure: CV RIGHT HEART CATH;  Surgeon: Dalton Baeza MD;  Location:  [facility] CARDI[facility] CATH LAB     CV RIGHT HEART CATH MEASUREMENTS RECORDED N/A 2021    Procedure: Right Heart Cath;  Surgeon: Chun Ball MD;  Location:  [facility] CARD[facility] CATH LAB     CV SWAN LUCIANA PROCEDURE N/A 2020    Procedure: [place] Luciana Procedure;  Surgeon: Chente Moss MD;  Location:  [facility] CARD[facility] CATH LAB     EP ICD Bilateral 3/16/2020    Procedure: EP ICD;  Surgeon: Dali Day MD;  Location:  HE[facility] CARDI[facility] CATH LAB     INSERT VENTRICULAR ASSIST DEVICE LEFT (HEARTMATE II) N/A 2020     Procedure: INSERTION, LEFT VENTRICULAR ASSIST DEVICE (HEARTMATE III);  Surgeon: Mac Jaramillo MD;  Location: [facility]     IR CVC TUNNEL PLACEMENT > 5 YRS OF AGE  2021     THORACOSCOPY Right 3/6/2020    Procedure: RIGHT VIDEO-ASSISTED THORASCOPIC SURGERY, EVACUATION OF HEMOTHORAX, PLACEMENT OF CHEST TUBES;  Surgeon: William Gan MD;  Location: [facility]      Allergies   Allergen Reactions     Heparin      HIT screen positive 20, reflex DAVINA negative; however heme recommended treating as if positive  HIT screen negative 20     Oxycodone Itching and Other (See Comments)     Chlorhexidine Rash      Social History     Tobacco Use     Smoking status: Former Smoker     Quit date: 1994     Years since quittin.9     Smokeless tobacco: Never Used   Substance Use Topics     Alcohol use: Not on file      Wt Readings from Last 1 Encounters:   21 96.3 kg (212 lb 6.4 oz)        Anesthesia Evaluation            ROS/MED HX  ENT/Pulmonary: Comment: Legionella PNA       Neurologic:  - neg neurologic ROS     Cardiovascular: Comment: NICM s/p LVAD     (+) hypertension-----CHF etiology: NICM  ICD dysrhythmias, a-flutter, valvular problems/murmurs type: MR Previous cardiac testing   Echo: Date: 21 Results:  HM III LVAD is in place at 5500 RPM. Left ventricular diastolic diameter is  4.3cm with a midline septum that has abnormal septal motion consistent with  prior sternotomy and/or conduction abnormalities. The aortic valve opens  intermitently and has mild aortic regurgitation. The LVAD inflow velocities  are not well seen but out flow velocities are normal.     The left ventricular ejection fraction is difficult to quantify due to  difficulty of images but is grossly estimated at 15-20%.     Due to technically difficult images quantification of the right ventricle is  not possible however the right ventricular function appears at least moderate  to severely reduced and dilated grossly.     There  is at least mild mitral regurgitation that is eccentric.     IVC diameter >2.1 cm collapsing <50% with sniff suggests a high RA pressure  estimated at 15 mmHg or greater.  Stress Test: Date: Results:    ECG Reviewed: Date: Results:    Cath: Date: Results:      METS/Exercise Tolerance:     Hematologic:  - neg hematologic  ROS     Musculoskeletal:       GI/Hepatic:  - neg GI/hepatic ROS     Renal/Genitourinary:     (+) renal disease, type: CRI,     Endo:     (+) type II DM,     Psychiatric/Substance Use:       Infectious Disease:       Malignancy:       Other:            Physical Exam    Airway        Mallampati: II   TM distance: > 3 FB   Neck ROM: full     Respiratory Devices and Support         Dental           Cardiovascular          Rhythm and rate: irregular and normal     Pulmonary   pulmonary exam normal                OUTSIDE LABS:  CBC:   Lab Results   Component Value Date    WBC 7.0 03/03/2021    WBC 7.3 03/02/2021    HGB 8.1 (L) 03/03/2021    HGB 9.2 (L) 03/02/2021    HCT 25.4 (L) 03/03/2021    HCT 29.2 (L) 03/02/2021     03/03/2021     03/02/2021     BMP:   Lab Results   Component Value Date     (L) 03/03/2021     (L) 03/02/2021    POTASSIUM 3.8 03/03/2021    POTASSIUM 4.1 03/02/2021    CHLORIDE 93 (L) 03/03/2021    CHLORIDE 92 (L) 03/02/2021    CO2 22 03/03/2021    CO2 23 03/02/2021    BUN 91 (H) 03/03/2021    BUN 83 (H) 03/02/2021    CR 7.08 (H) 03/03/2021    CR 6.79 (H) 03/02/2021     (H) 03/03/2021     (H) 03/02/2021     COAGS:   Lab Results   Component Value Date    PTT 40 (H) 03/03/2021    INR 2.10 (H) 03/03/2021    FIBR 247 02/19/2020     POC:   Lab Results   Component Value Date     (H) 03/02/2021     HEPATIC:   Lab Results   Component Value Date    ALBUMIN 2.6 (L) 03/03/2021    PROTTOTAL 7.0 03/03/2021    ALT 33 03/03/2021    AST 48 (H) 03/03/2021    ALKPHOS 122 03/03/2021    BILITOTAL 0.5 03/03/2021     OTHER:   Lab Results   Component Value Date     PH 7.46 (H) 02/22/2021    LACT 0.7 02/24/2021    A1C 6.8 (H) 01/06/2021    CALOS 8.0 (L) 03/03/2021    PHOS 5.2 (H) 02/22/2021    MAG 1.7 03/02/2021    TSH 5.21 (H) 01/05/2021    T4 1.04 01/05/2021    .0 (H) 02/16/2021    SED 72 (H) 02/16/2021       Anesthesia Plan    ASA Status:  3      Anesthesia Type: General.     - Airway: ETT   Induction: Intravenous.   Maintenance: Inhalation.        Consents    Anesthesia Plan(s) and associated risks, benefits, and realistic alternatives discussed. Questions answered and patient/representative(s) expressed understanding.     - Discussed with:  Patient      - Extended Intubation/Ventilatory Support Discussed: No.      - Patient is DNR/DNI Status: No    Use of blood products discussed: No .     Postoperative Care            Comments:                Ravin Truong MD

## 2021-03-03 NOTE — PROGRESS NOTES
Pt arrived in ECHO department  for scheduled CHAMP.   Procedure explained, questions answered and consent signed.   Pt's throat sprayed at 0855, therefore pt will not be able to eat or drink until 2 hours after at 1055.  Informed pt of this time and encouraged to start with warm fluids and soft foods.    Pt tolerated procedure well, and was given a total of 75 mcg IV fentanyl and 1.5 mg IV versed for conscious sedation.  Pt denied throat or chest pain after CHAMP complete.   CHAMP probe 61 used for procedure.  No clots visualized on CHAMP so proceeded with DCCV.  Anesthesia gave pt 50 mg IV brevitol for sedation and pt was DCCV at 200 Joules to a SR.  Patient monitored closely until fully awake and VSS. LVAD numbers WNL throughout procedure.   Report given to KRISTEN Ambrosio.

## 2021-03-03 NOTE — PLAN OF CARE
Admitted 2/15/2021 with mixed cardiogenic and distributive shock with intermittent wide-complex tachycardia c/b hypoxemia requiring intubation, BERNARDA, and legionella pneumonia. History of NICM, EF 15-20% s/p HM3 02/2020 and ICD 03/2020, MSSA driveline infection and bacteremia, VT, nonobstructive CAD, severe MR, atrial fibrillation, HTN, ABHINAV, CKD IV, DMII, and HIT.    Neuro: A&Ox4. Ambien given at hs. Slept well overnight.  Cardiac: Sinus tach with BBB vs wide complex tachycardia. HM 3 numbers WNL. VSS.   Respiratory: Sating 90s on RA. Uses home CPAP.  GI/: Adequate urine output. Had ultrafiltration run yesterday evening.No BM this shift.  Diet/appetite: On 2 gram Na diet. NPO since midnight.  Activity: Up with assist of 1-2 with cane.  Pain: Denies pain  Skin: No new deficits noted.  LDA's: PICC infusing Dobutamine at 3mcg/kg/min; right internal jugular CVC for HD    Plan: Plan for CHAMP and cardioversion today pending INR results. Will continue to monitor and notify team accordingly.

## 2021-03-03 NOTE — PRE-PROCEDURE
GENERAL PRE-PROCEDURE:     Risks and benefits: Risks, benefits and alternatives were discussed    Consent given by:  Patient  Patient states understanding of procedure being performed: Yes    Patient's understanding of procedure matches consent: Yes    Procedure consent matches procedure scheduled: Yes    Expected level of sedation:  Moderate  Appropriately NPO:  Yes  ASA Class:  Class 3- Severe systemic disease, definite functional limitations  Mallampati  :  Grade 2- soft palate, base of uvula, tonsillar pillars, and portion of posterior pharyngeal wall visible  Lungs:  Lungs clear with good breath sounds bilaterally  Heart:  Normal heart sounds and rate  History & Physical reviewed:  History and physical reviewed and no updates needed  Statement of review:  I have reviewed the lab findings, diagnostic data, medications, and the plan for sedation

## 2021-03-03 NOTE — PROGRESS NOTES
LVAD Social Work Services Progress Note      Date of Initial Social Work Evaluation: 2/16/2021  Collaborated with: Nephrology Resident, [facility] ([phone]), LVAD Manager and Coordinator, pt and his wife, Chapis, at bedside     Data: Pt with hx of LVAD implant 2/18/2020. Pt admitted 2/16/2021 from OSH for cardiogenic and septic shock and multiorgan failure. Pt was extubated 2/20. Pt had tunneled catheter placed on 2/23.   Social Work continues to work on outpatient dialysis plan. Received call from [facility] that the [place] dialysis unit ([phone]) has expressed interest in receiving LVAD training. [facility] has also expressed interest and would also need training. [facility] and [facility] are equal distance from pt's home. [facility] will not be able to obtain training until at least April. Unclear to writer how soon [facility] would be able to get trained. Ultimate goal is to get pt to [facility] dialysis center (30 minutes from pt's home), but facility has a waiting list and they are not sure when there would be an opening.   Discussed with LVAD manager and it does not seem reasonable to train two dialysis centers that are equal distance from pt's home. Logistically speaking it appears more reasonable to continue to work with [facility] if ultimate goal is to get pt to [facility] dialysis. Anticipate that both facilities would be able to receive the LVAD training at the same time. Writer will review with pt and his wife and offer them choice on dialysis centers.   Discussed with Nephrology option of PD and will discuss with team. Pt and wife would be interested in PD if this is an option and understand this would take some time to arrange that would include teaching.    Intervention: Supportive Visit   Assessment: Pt and wife are frustrated with options for outpatient dialysis, but also understanding due to complexity of pt's case.   Education provided by  SW: Outpatient Dialysis Planning   Plan:    Discharge Plans in Progress: [facility] following but unable to accept until pt's outpatient dialysis plans are confirmed.     We have three outpatient dialysis centers that are willing to get trained: [facility] and [facility]. [facility] will not be trained until at least April and unclear when [facility] would be trained.     Barriers to d/c plan: Outpatient Dialysis Arrangements     Follow up Plan: SW to f/u with pt and wife tomorrow to discuss options again and obtain preference for [facility] vs [facility]

## 2021-03-04 ENCOUNTER — APPOINTMENT (OUTPATIENT)
Dept: PHYSICAL THERAPY | Facility: CLINIC | Age: 66
DRG: 870 | End: 2021-03-04
Attending: INTERNAL MEDICINE
Payer: MEDICARE

## 2021-03-04 ENCOUNTER — APPOINTMENT (OUTPATIENT)
Dept: OCCUPATIONAL THERAPY | Facility: CLINIC | Age: 66
DRG: 870 | End: 2021-03-04
Attending: INTERNAL MEDICINE
Payer: MEDICARE

## 2021-03-04 ENCOUNTER — TELEPHONE (OUTPATIENT)
Dept: CARDIOLOGY | Facility: CLINIC | Age: 66
End: 2021-03-04

## 2021-03-04 LAB
ALBUMIN SERPL-MCNC: 2.6 G/DL (ref 3.4–5)
ALBUMIN SERPL-MCNC: 2.8 G/DL (ref 3.4–5)
ALP SERPL-CCNC: 120 U/L (ref 40–150)
ALP SERPL-CCNC: 134 U/L (ref 40–150)
ALT SERPL W P-5'-P-CCNC: 29 U/L (ref 0–70)
ALT SERPL W P-5'-P-CCNC: 31 U/L (ref 0–70)
ANION GAP SERPL CALCULATED.3IONS-SCNC: 11 MMOL/L (ref 3–14)
ANION GAP SERPL CALCULATED.3IONS-SCNC: 7 MMOL/L (ref 3–14)
APTT PPP: 39 SEC (ref 22–37)
AST SERPL W P-5'-P-CCNC: 45 U/L (ref 0–45)
AST SERPL W P-5'-P-CCNC: 49 U/L (ref 0–45)
BASOPHILS # BLD AUTO: 0.1 10E9/L (ref 0–0.2)
BASOPHILS NFR BLD AUTO: 0.9 %
BILIRUB SERPL-MCNC: 0.5 MG/DL (ref 0.2–1.3)
BILIRUB SERPL-MCNC: 0.6 MG/DL (ref 0.2–1.3)
BUN SERPL-MCNC: 55 MG/DL (ref 7–30)
BUN SERPL-MCNC: 63 MG/DL (ref 7–30)
CALCIUM SERPL-MCNC: 8.2 MG/DL (ref 8.5–10.1)
CALCIUM SERPL-MCNC: 8.6 MG/DL (ref 8.5–10.1)
CHLORIDE SERPL-SCNC: 93 MMOL/L (ref 94–109)
CHLORIDE SERPL-SCNC: 98 MMOL/L (ref 94–109)
CO2 SERPL-SCNC: 24 MMOL/L (ref 20–32)
CO2 SERPL-SCNC: 27 MMOL/L (ref 20–32)
CREAT SERPL-MCNC: 4.81 MG/DL (ref 0.66–1.25)
CREAT SERPL-MCNC: 4.91 MG/DL (ref 0.66–1.25)
DIFFERENTIAL METHOD BLD: ABNORMAL
EOSINOPHIL # BLD AUTO: 0.1 10E9/L (ref 0–0.7)
EOSINOPHIL NFR BLD AUTO: 2.5 %
ERYTHROCYTE [DISTWIDTH] IN BLOOD BY AUTOMATED COUNT: 23 % (ref 10–15)
GFR SERPL CREATININE-BSD FRML MDRD: 11 ML/MIN/{1.73_M2}
GFR SERPL CREATININE-BSD FRML MDRD: 12 ML/MIN/{1.73_M2}
GLUCOSE BLDC GLUCOMTR-MCNC: 109 MG/DL (ref 70–99)
GLUCOSE BLDC GLUCOMTR-MCNC: 141 MG/DL (ref 70–99)
GLUCOSE BLDC GLUCOMTR-MCNC: 154 MG/DL (ref 70–99)
GLUCOSE BLDC GLUCOMTR-MCNC: 188 MG/DL (ref 70–99)
GLUCOSE SERPL-MCNC: 104 MG/DL (ref 70–99)
GLUCOSE SERPL-MCNC: 199 MG/DL (ref 70–99)
HCT VFR BLD AUTO: 26.2 % (ref 40–53)
HGB BLD-MCNC: 8.2 G/DL (ref 13.3–17.7)
IMM GRANULOCYTES # BLD: 0.1 10E9/L (ref 0–0.4)
IMM GRANULOCYTES NFR BLD: 1.1 %
INR PPP: 2.23 (ref 0.86–1.14)
INTERPRETATION ECG - MUSE: NORMAL
LDH SERPL L TO P-CCNC: 304 U/L (ref 85–227)
LYMPHOCYTES # BLD AUTO: 0.7 10E9/L (ref 0.8–5.3)
LYMPHOCYTES NFR BLD AUTO: 11.9 %
MCH RBC QN AUTO: 25.3 PG (ref 26.5–33)
MCHC RBC AUTO-ENTMCNC: 31.3 G/DL (ref 31.5–36.5)
MCV RBC AUTO: 81 FL (ref 78–100)
MONOCYTES # BLD AUTO: 0.8 10E9/L (ref 0–1.3)
MONOCYTES NFR BLD AUTO: 14.8 %
NEUTROPHILS # BLD AUTO: 3.8 10E9/L (ref 1.6–8.3)
NEUTROPHILS NFR BLD AUTO: 68.8 %
NRBC # BLD AUTO: 0 10*3/UL
NRBC BLD AUTO-RTO: 0 /100
PLATELET # BLD AUTO: 224 10E9/L (ref 150–450)
POTASSIUM SERPL-SCNC: 3.9 MMOL/L (ref 3.4–5.3)
POTASSIUM SERPL-SCNC: 3.9 MMOL/L (ref 3.4–5.3)
PROT SERPL-MCNC: 7.2 G/DL (ref 6.8–8.8)
PROT SERPL-MCNC: 7.6 G/DL (ref 6.8–8.8)
RBC # BLD AUTO: 3.24 10E12/L (ref 4.4–5.9)
SODIUM SERPL-SCNC: 128 MMOL/L (ref 133–144)
SODIUM SERPL-SCNC: 132 MMOL/L (ref 133–144)
WBC # BLD AUTO: 5.6 10E9/L (ref 4–11)

## 2021-03-04 PROCEDURE — 97116 GAIT TRAINING THERAPY: CPT | Mod: GP | Performed by: PHYSICAL THERAPIST

## 2021-03-04 PROCEDURE — 97535 SELF CARE MNGMENT TRAINING: CPT | Mod: GO | Performed by: OCCUPATIONAL THERAPIST

## 2021-03-04 PROCEDURE — 80053 COMPREHEN METABOLIC PANEL: CPT | Performed by: INTERNAL MEDICINE

## 2021-03-04 PROCEDURE — 85025 COMPLETE CBC W/AUTO DIFF WBC: CPT | Performed by: STUDENT IN AN ORGANIZED HEALTH CARE EDUCATION/TRAINING PROGRAM

## 2021-03-04 PROCEDURE — 85610 PROTHROMBIN TIME: CPT | Performed by: STUDENT IN AN ORGANIZED HEALTH CARE EDUCATION/TRAINING PROGRAM

## 2021-03-04 PROCEDURE — 80053 COMPREHEN METABOLIC PANEL: CPT | Performed by: STUDENT IN AN ORGANIZED HEALTH CARE EDUCATION/TRAINING PROGRAM

## 2021-03-04 PROCEDURE — 97530 THERAPEUTIC ACTIVITIES: CPT | Mod: GO | Performed by: OCCUPATIONAL THERAPIST

## 2021-03-04 PROCEDURE — 999N000157 HC STATISTIC RCP TIME EA 10 MIN

## 2021-03-04 PROCEDURE — 85730 THROMBOPLASTIN TIME PARTIAL: CPT | Performed by: STUDENT IN AN ORGANIZED HEALTH CARE EDUCATION/TRAINING PROGRAM

## 2021-03-04 PROCEDURE — 250N000013 HC RX MED GY IP 250 OP 250 PS 637: Performed by: STUDENT IN AN ORGANIZED HEALTH CARE EDUCATION/TRAINING PROGRAM

## 2021-03-04 PROCEDURE — 97530 THERAPEUTIC ACTIVITIES: CPT | Mod: GP | Performed by: PHYSICAL THERAPIST

## 2021-03-04 PROCEDURE — 214N000001 HC R&B CCU UMMC

## 2021-03-04 PROCEDURE — 250N000013 HC RX MED GY IP 250 OP 250 PS 637

## 2021-03-04 PROCEDURE — 999N001017 HC STATISTIC GLUCOSE BY METER IP

## 2021-03-04 PROCEDURE — 250N000011 HC RX IP 250 OP 636: Performed by: STUDENT IN AN ORGANIZED HEALTH CARE EDUCATION/TRAINING PROGRAM

## 2021-03-04 PROCEDURE — 93750 INTERROGATION VAD IN PERSON: CPT | Performed by: INTERNAL MEDICINE

## 2021-03-04 PROCEDURE — 250N000013 HC RX MED GY IP 250 OP 250 PS 637: Performed by: INTERNAL MEDICINE

## 2021-03-04 PROCEDURE — 99232 SBSQ HOSP IP/OBS MODERATE 35: CPT | Mod: GC | Performed by: INTERNAL MEDICINE

## 2021-03-04 PROCEDURE — 99232 SBSQ HOSP IP/OBS MODERATE 35: CPT | Mod: 25 | Performed by: INTERNAL MEDICINE

## 2021-03-04 PROCEDURE — 250N000012 HC RX MED GY IP 250 OP 636 PS 637: Performed by: INTERNAL MEDICINE

## 2021-03-04 PROCEDURE — 83615 LACTATE (LD) (LDH) ENZYME: CPT | Performed by: STUDENT IN AN ORGANIZED HEALTH CARE EDUCATION/TRAINING PROGRAM

## 2021-03-04 RX ORDER — WARFARIN SODIUM 4 MG/1
4 TABLET ORAL
Status: COMPLETED | OUTPATIENT
Start: 2021-03-04 | End: 2021-03-04

## 2021-03-04 RX ADMIN — BUMETANIDE 4 MG: 2 TABLET ORAL at 08:17

## 2021-03-04 RX ADMIN — CEPHALEXIN 500 MG: 500 CAPSULE ORAL at 21:53

## 2021-03-04 RX ADMIN — TAMSULOSIN HYDROCHLORIDE 0.4 MG: 0.4 CAPSULE ORAL at 08:17

## 2021-03-04 RX ADMIN — FLUOXETINE 30 MG: 20 CAPSULE ORAL at 08:17

## 2021-03-04 RX ADMIN — HYDRALAZINE HYDROCHLORIDE 100 MG: 100 TABLET, FILM COATED ORAL at 13:37

## 2021-03-04 RX ADMIN — INSULIN ASPART 1 UNITS: 100 INJECTION, SOLUTION INTRAVENOUS; SUBCUTANEOUS at 17:26

## 2021-03-04 RX ADMIN — DOCUSATE SODIUM 50 MG AND SENNOSIDES 8.6 MG 1 TABLET: 8.6; 5 TABLET, FILM COATED ORAL at 21:53

## 2021-03-04 RX ADMIN — ALLOPURINOL 100 MG: 100 TABLET ORAL at 08:17

## 2021-03-04 RX ADMIN — OXYMETAZOLINE HYDROCHLORIDE 2 SPRAY: 0.05 SPRAY NASAL at 08:18

## 2021-03-04 RX ADMIN — CEPHALEXIN 500 MG: 500 CAPSULE ORAL at 08:17

## 2021-03-04 RX ADMIN — HYDRALAZINE HYDROCHLORIDE 125 MG: 100 TABLET, FILM COATED ORAL at 21:53

## 2021-03-04 RX ADMIN — Medication 1 CAPSULE: at 08:17

## 2021-03-04 RX ADMIN — FINASTERIDE 5 MG: 5 TABLET, FILM COATED ORAL at 08:17

## 2021-03-04 RX ADMIN — ZOLPIDEM TARTRATE 5 MG: 5 TABLET, FILM COATED ORAL at 21:54

## 2021-03-04 RX ADMIN — AMIODARONE HYDROCHLORIDE 200 MG: 200 TABLET ORAL at 08:17

## 2021-03-04 RX ADMIN — DOBUTAMINE HYDROCHLORIDE 3 MCG/KG/MIN: 200 INJECTION INTRAVENOUS at 06:30

## 2021-03-04 RX ADMIN — ASPIRIN 81 MG CHEWABLE TABLET 81 MG: 81 TABLET CHEWABLE at 08:17

## 2021-03-04 RX ADMIN — OMEPRAZOLE 20 MG: 20 CAPSULE, DELAYED RELEASE ORAL at 08:17

## 2021-03-04 RX ADMIN — WARFARIN SODIUM 4 MG: 4 TABLET ORAL at 17:26

## 2021-03-04 RX ADMIN — HYDRALAZINE HYDROCHLORIDE 100 MG: 100 TABLET, FILM COATED ORAL at 06:16

## 2021-03-04 RX ADMIN — MELATONIN TAB 3 MG 3 MG: 3 TAB at 21:54

## 2021-03-04 RX ADMIN — INSULIN ASPART 1 UNITS: 100 INJECTION, SOLUTION INTRAVENOUS; SUBCUTANEOUS at 13:37

## 2021-03-04 ASSESSMENT — ACTIVITIES OF DAILY LIVING (ADL)
ADLS_ACUITY_SCORE: 15

## 2021-03-04 NOTE — PROGRESS NOTES
[facility]    Cardiology Progress Note- Cards 2        Date of Admission:  2/15/2021     Today's Plan  - Continue low dose dobutamine   - continue hydralazine 100/100/125  - dialysis as per nephrology   - Bumetanide prn on non-dialysis days  - Continue antibiotics:   -PO cefalexin 500mg Q6h (renally dosed equivalent) for 14 days followed by 500mg bid (renally dosed equivalent)  - ongoing PT/OT  - continue warfarin  - Social work arranging dialysis and placement    Assessment & Plan: SL   Eliseo Tanner is a 65 year old male with PMHx relevant for NICM with LVEF 15-20%, NYHA III s/p HM III 2/18/2020 (post op complicated by retrosternal hematoma with bleeding in the lungs), s/p ICD placed on 3/16/2020, h/o MSSA driveline infection and bacteremia 11/5/2020 on prophylactic cephalexin, h/o VT on amiodarone, moderate nonobstructive CAD, severe MR, atrial fibrillation on warfarin, hypertension, ABHINAV requiring CPAP, CKD IV, DMII, HLP,  h/o HIT who presented to the Cardiovascular Unit (4E Cardiac ICU) with mixed cardiogenic and distributive shock withan intermittent wide complex tachycardia. Initially requiring vasopressors and inotropes, intubated for hypoxemia. Treated for legionella pneumonia. Stabilized on low dose dobutamine off of pressors. Extubated. With persistent oliguric renal failure.       Cardiovascular:     #Mixed Cardiogenic and Septic Shock - Improved  #Multiorgan Failure - Improved  Presented from [facility] with subacute development of shortness of breath, dyspnea on exertion, dizziness/lightheadedness with near syncopal event found to be febrile with intermittent wide complex tachycardia. Recent COVID19 moderna vaccination. CT chest showing bilateral infiltrates. Community acquired pneumonia vs. Possible recurrent of MSSA bacteremia WBC 24.5, Procal 7.95, , Lactacte 6.9. Completed course of IV antibiotics.     Mildly  elevated filling pressures on presentation CVP 18, PA 35/20, PCWP 11. Likely some component of cardiogenic shock. LVAD parameters relatively stable despite markedly elevated LDH 8,531. Euvolemic on exam. Worsening renal function, Cr 4.62, up-trending LFT's/ INR. Legionella antigen positive. Weaned off of vasopressor and maintained on dobutamine. Acute hepatic injury which is improving. Improved renal function but continues to Require dialysis. Shock resolved.      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 Bhatti (ID clinic)   # Troponin elevation (likely demand ischemia)  # Essential Hypertension  # Hyperlipidemia        Patient with long standing history of NICM previously implanted LVAD (HM III) on 02/18/20 due to worsening functional status and systolic ventricular dysfunction (further complicated by RV dysfunction by VAD hemodynamic compensatory mechanism, VT in ICU now on Amiodarone and Atrial Fibrillation with AVR requiring DCCV on 02/28/20).      Elevated cardiac biomarkers on presentation, SGC with only mildly elevated filling pressures. Euvolemic on exam. Troponin elevation likely related to demand ischemia with no concerns for ACS. Most recent VAD interrogation without any significant Flow-Alarms     LDH 8,531. Initial concern for LVAD thrombus. However given relatively stable LVAD parameters, degree of LDH elevation is out of proportion. Will continue to monitor for LVAD alarms.      - continue dobutamine for renal recovery  - Held ACE-I/ARB/ARNi: Lisinopril; due to acute renal failure  - No BB; deferred 2/2  shock  - Aldosterone antagonist: deferred while other medical therapy is optimized  - SCD prophylaxis: ICD  - Fluid status : hypervolemic, IV diuretic today, further management per dialysis  - MAP Goal: 65-90  - On Warfarin for HM-III INR Goal 2-3  - Continue Hydralazine  - Continue ASA 81 mg per oral daily       # Wide complex tachycardia. Atrial Flutter vs Afib vs Ventricular Tachycardia  # History of Atrial Fibrillation s/p DCCV/history of Atrial Flutter       Wide complex tachycardia HR 140s on presentation in the setting of febrile illness and likely pneumonia. Did not initially respond to adenosine. Concern for VT. Intermittently back into HR 60s with underlying rhythm of atrial flutter 3:1 conduction block. Per EP can not rule out VT, may be dual tachycardias. Has been in persistent atrial flutter. S/p CHAMP+DCCV, sucessfully maintaining NSR.       - continue home amiodarone  - Ensure K+ >4.0 mEq/L and Mg+2 >2.0  - continue warfarin  - On cardiac telemetry     Pulmonary  #Legionella Pneumonia - resolved  Hypoxemic in the setting of mixed cardiogenic septic shock requiring emergent intubation. O2 requirements quickly improved. S/p extubation on room air.     Infectious disease     #Sepsis  #Legionella Pneumonia  #Bilateral pulmonary infiltrates  CT showing bilateral diffuse patchy consolidations concerning for infectious process. Low suspicion for recurrent driveline or possible hardware infection (history of MSSA Chronic Driveline Infection). Urine without pyuria. Urine legionella ag positive.   - f/u blood cultures  - continue Abx as per ID              -Cefazolin (2/18/21 - 3/1/21) followed by PO cefalexin              Azithromycin (2/15/21 - 2/25/21)     Renal:  # Acute on Chronic CKD stage IV likely 2/2 Septic Shock   s/p L TDC receiving IHD  - IHD per nephrology  - Daily Weight's  - Strict I/O's  - Daily BMP's  - Avoid any additional nephrotoxicity   - intermittent bumetanide on non-dialysis days     GI  #  Shock Liver   # Congestive Hepatopathy - resolved  Hepatoccelular pattern of liver injury  > 3,496, AST 1,441 > 8,490 likely 2/2 to septic shock. INR downtrended s/p 3mg IV vit K. RUQ US without portal vein thrombus. LFTs downtrended nicely. No evidence of synthetic dysfunction.      Hematological   # Chronic Microcytic/Hypochromic Anemia (likely related to iron deficiency)  # Coagulopathy related to sepsis   # IgG Kappa monoclonal Gammopathy of undetermined significance  - Monitor Hgb (baseline 8-9g/dL)  - transfuse for Hb < 7.0   - Patient [facility] and [facility] (Dr. Ibarra)     # Previous Concern For HIT  On previous hospitalization for LVAD placement (02/06/20-03/20/20), concern for HIT. Platelets 250K --> ~ 90K wuth documented history of exposure to unfractionate heparin products at OSH prior to his installation at the [facility].     At that time, patient underwent two HIT panel tests (first one was negative and second antibody test was positive). No known thrombosis events. DAVINA antibody was negative raising question about validity of diagnosis . Per hematology at the time (Dr. James), no other cause for isolated thrombocytopenia. Immediate recovery of plts following d/c of heparin. 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      - avoiding heparin products  - continue warfarin     Endocrine  # Type II DM     > Last Hgb A1C: 6.8 (01/06/21)     - Continue PTA Insulin Basaglar 10 Units Aq at bedtime  - On Medium sliding scale insulin  - Oral Hypoglycemia Protocol      Neurologic:  # Disorientation/Toxic Metabolic Encephalopathy - Resolved  # Chronic Intracranial Small Vessel Disease        Patient reported some lightheadedness/dizziness and disorientation the days prior to hospitalization while at home. However, no reports of LOC, seizure activity, focal deficits, or findings for acute intracranial pathology on most recent OSH CT head w/o contrast (02/15/21). Mental status  improved, s/p extubation        DVT Prophylaxis: therapeutic warfarin  Guevara Catheter: not present  Code Status: Full Code  Fluids: no IVFs  Lines: RIJ TDC (2/23)    Disposition Plan   Pending establishment of long-term dialysis needs      Entered: Daniel Fleming MD 03/04/2021, 11:23 AM       The patient's care was discussed with the Attending Physician, Dr. Jiang.    Daniel Fleming MD   Cardiology Fellow  [facility] [id number] [place] [address]  Please see sign in/sign out for up to date coverage information  ______________________________________________________________________    Interval History   No complaints. Yesterday underwent dialysis with 2L removed. Feels legs improved but still swollen. Continues in NSR. No shortness of breath, palpitations, fevers or chills.     Data reviewed today: I reviewed all medications, new labs and imaging results over the last 24 hours.    Physical Exam   Vital Signs: Temp: 98.3  F (36.8  C) Temp src: Oral BP: (!) 113/90 Pulse: 78   Resp: 18 SpO2: 97 % O2 Device: None (Room air)    Weight: 207 lbs 12.8 oz   Gen: awake and in NAD  HEENT: gauze in Left nares, RIJ TDC in place. ~7cm JVP  Heart: LVAD hum.   Lungs: Bilateral crackles. No wheezes  GI: Soft, nontender, nondistended.   Extremities: WWP, 1+ R>L edema  Neuro: grossly non focal  Skin: no rashes.    Data   Recent Labs   Lab 03/04/21  0405 03/03/21 2010 03/03/21  0455 03/02/21  0401 03/02/21  0401   WBC 5.6  --  7.0  --  7.3   HGB 8.2*  --  8.1*  --  9.2*   MCV 81  --  80  --  82     --  236  --  218   INR 2.23*  --  2.10*  --  1.87*   * 131* 127*   < > 127*   POTASSIUM 3.9 3.8 3.8   < > 3.9   CHLORIDE 98 98 93*   < > 93*   CO2 27 24 22   < > 23   BUN 55* 46* 91*   < > 73*   CR 4.81* 4.28* 7.08*   < > 6.41*   ANIONGAP 7 9 13   < > 11   CALOS 8.6 9.0 8.0*   < > 7.9*   * 162* 104*   < > 126*   ALBUMIN 2.6* 2.9* 2.6*   < > 2.6*   PROTTOTAL 7.2 7.9 7.0   < > 7.0   BILITOTAL  0.6 0.5 0.5   < > 0.5   ALKPHOS 120 144 122   < > 136   ALT 29 33 33   < > 36   AST 45 50* 48*   < > 57*    < > = values in this interval not displayed.     No results found for this or any previous visit (from the past 24 hour(s)).  Medications     dextrose       dextrose Stopped (02/17/21 1600)     DOBUTamine 3 mcg/kg/min (03/04/21 0630)     Warfarin Therapy Reminder         allopurinol  100 mg Oral or Feeding Tube Daily     amiodarone  200 mg Oral Daily     aspirin  81 mg Oral Daily     bumetanide  4 mg Oral Daily     cephALEXin  500 mg Oral Q12H BRITTANI     finasteride  5 mg Oral Daily     FLUoxetine  30 mg Oral Daily     hydrALAZINE  100 mg Oral or Feeding Tube 2 times per day     hydrALAZINE  125 mg Oral At Bedtime     insulin aspart  1-7 Units Subcutaneous TID AC     insulin aspart  1-5 Units Subcutaneous At Bedtime     melatonin  3 mg Oral At Bedtime     multivitamin RENAL  1 capsule Oral Daily     omeprazole  20 mg Oral QAM AC     oxymetazoline  2 spray Both Nostrils BID     senna-docusate  2 tablet Oral BID     tamsulosin  0.4 mg Oral Daily     warfarin ANTICOAGULANT  4 mg Oral ONCE at 18:00

## 2021-03-04 NOTE — TELEPHONE ENCOUNTER
Left a voicemail with patient to switch his 3/10 appointment with Mervat Decker PA-C to a virtual visit. Mervat is only seeing patients virtually in the AM of 3/10.     The patient can either keep his labs and device check F2F and then switch his follow up with Brianne to virtual OR he can reschedule all three visits to a day where Karolina is able to see him in person.

## 2021-03-04 NOTE — PLAN OF CARE
A&Ox4, VSS, SR w/1st deg AVB in 70s. Dobutamine at 3mcg/kg/min. Maps 90s. HM 3, DL dressing intact, numbers WNL, no alarms. Adequate UOP, iHD run 3/3 w/2L off. Last BM 3/1, passing flatus. Skin intact. Up w/asst x1-2 and cane. Need plan for outpt dialysis - HD vs PD. Notify Cards 2 of changes or concerns.     Fernando Lu RN  Hours cared for: [phone]

## 2021-03-04 NOTE — PROGRESS NOTES
.    Nephrology Progress Note  03/04/2021         Assessment & Recommendations:   66 yo M with PMHx  NICM  s/p HM III , VT, severe MR, atrial fibrillation on warfarin, hypertension, CKD IV, DMII, HIT presented to OSH with fevers and cough in the setting of syncopal episode transferred to [facility] for further cares. Hospitalization complicated by Afib with RVR with hypotension and intubation with upgrade of cares to the ICU. Found to be in septic shock 2/2 legionella pneumonia with possible cardiogenic shock. Nephrology was consulted for evaluation and management of anuric BERNARDA. Started on RRT 2/16, remains dialysis dependent.     Dialysis dependent non-oligouric BERNARDA 2/2 ischemic ATN  Baseline Cr last yr s/p LVAD placement was around 1. On admission ~2.3 and doubled within 24 hours with rapid decrease in UOP. UA unimpressive with baseline proteinuria and new granular casts. Initiated on RRT 2/16. Had initially hoped he would have enough renal recovery to avoid dialysis, however he remains dialysis dependent.   - Plan for HD MWF  - Continue bumex 4 mg BID  - Avoid nephrotoxins as able  - Renally dose meds  - Monitor I/Os  - Appreciate SW & Care Coordinator assistance in arranging outpatient dialysis    Volume status  Euvolemic to slight hypervolemic. Tolerated UF 2L without any issues 3/3.  - Daily weights, Is/Os   We will continue to challenge EDW as able. Will need to go slowly. Currently no symptoms of volume overload.    Electrolytes   Mild Hyponatremia - CTM    Acid/base - stable   Anemia - Hb 8.2, stable. No s/s of bleeding.     Recommendations were communicated to primary team via this note     Seen and discussed with Dr. Hunter Ag MD  [phone]  I have seen and examined the patient and reviewed all current labs and findings. I concur with the assessment and plan as it is outlined. Any changes to the note made by me are in bold. Char Harrison MD MS FNKF      Interval History :   Nursing and provider  notes from last 24 hours reviewed.  Tolerated HD well yesterday with 2L removed and feels well today. More energy. Denies SOB, chest pain. LE edema somewhat improved today.     Review of Systems:   4pt ROS negative except as above in HPI    Physical Exam:   I/O last 3 completed shifts:  In: 913.15 [P.O.:760; I.V.:153.15]  Out: 4425 [Urine:2425; Other:2000]   BP 97/86 (BP Location: Left arm)   Pulse 81   Temp 98.1  F (36.7  C) (Oral)   Resp 16   Wt 94.3 kg (207 lb 12.8 oz)   SpO2 96%   BMI 32.55 kg/m       General : Pt awake, NAD  Lungs : No increased WOB. No appreciable crackles on the lung fields  Cardiac : LVAD in place  Abdomen : Soft/ND/NT  LE : trace edema in bilateral LE  Dialysis Access : tunneled RIJ catheter    Labs:   All labs reviewed by me  Electrolytes/Renal -   Recent Labs   Lab Test 03/04/21  0405 03/03/21 2010 03/03/21  0455 03/02/21  0401 03/02/21  0401 02/22/21  1539 02/22/21  1539 02/22/21  0353 02/22/21  0353 02/21/21  1617 02/21/21  1617   * 131* 127*   < > 127*   < >  --    < > 136   < >  --    POTASSIUM 3.9 3.8 3.8   < > 3.9   < >  --    < > 4.7   < >  --    CHLORIDE 98 98 93*   < > 93*   < >  --    < > 106   < >  --    CO2 27 24 22   < > 23   < >  --    < > 24   < >  --    BUN 55* 46* 91*   < > 73*   < >  --    < > 30   < >  --    CR 4.81* 4.28* 7.08*   < > 6.41*   < >  --    < > 1.94*   < >  --    * 162* 104*   < > 126*   < >  --    < > 109*   < >  --    CALOS 8.6 9.0 8.0*   < > 7.9*   < >  --    < > 8.1*   < >  --    MAG  --   --   --   --  1.7  --  2.6*  --  2.5*  --  2.6*   PHOS  --   --   --   --   --   --  5.2*  --  4.4  --  4.5    < > = values in this interval not displayed.       CBC -   Recent Labs   Lab Test 03/04/21 0405 03/03/21 0455 03/02/21  0401   WBC 5.6 7.0 7.3   HGB 8.2* 8.1* 9.2*    236 218       LFTs -   Recent Labs   Lab Test 03/04/21 0405 03/03/21 2010 03/03/21  0455   ALKPHOS 120 144 122   BILITOTAL 0.6 0.5 0.5   ALT 29 33 33   AST 45 50*  48*   PROTTOTAL 7.2 7.9 7.0   ALBUMIN 2.6* 2.9* 2.6*       Iron Panel -   Recent Labs   Lab Test 02/27/21  0415 11/16/20  0616 02/08/20  0408   IRON 28* 16* 25*   IRONSAT 9* 6* 8*   HEAVENLY 276 75 189       Imaging:    Current Medications:    allopurinol  100 mg Oral or Feeding Tube Daily     amiodarone  200 mg Oral Daily     aspirin  81 mg Oral Daily     bumetanide  4 mg Oral Daily     cephALEXin  500 mg Oral Q12H BRITTANI     finasteride  5 mg Oral Daily     FLUoxetine  30 mg Oral Daily     hydrALAZINE  100 mg Oral or Feeding Tube 2 times per day     hydrALAZINE  125 mg Oral At Bedtime     insulin aspart  1-7 Units Subcutaneous TID AC     insulin aspart  1-5 Units Subcutaneous At Bedtime     melatonin  3 mg Oral At Bedtime     multivitamin RENAL  1 capsule Oral Daily     omeprazole  20 mg Oral QAM AC     oxymetazoline  2 spray Both Nostrils BID     senna-docusate  2 tablet Oral BID     tamsulosin  0.4 mg Oral Daily     warfarin ANTICOAGULANT  4 mg Oral ONCE at 18:00       dextrose       dextrose Stopped (02/17/21 1600)     DOBUTamine 3 mcg/kg/min (03/04/21 1338)     Warfarin Therapy Reminder       Wendy Ag MD

## 2021-03-04 NOTE — PLAN OF CARE
Admitted 2/15/2021 with mixed cardiogenic and distributive shock with intermittent wide-complex tachycardia c/b hypoxemia requiring intubation, liver shock, BERNARDA, and legionella pneumonia. History of NICM, EF 15-20% s/p HM3 02/2020 and ICD 03/2020, MSSA driveline infection and bacteremia, VT, nonobstructive CAD, severe MR, afib, HTN, ABHINAV, CKD IV, DMII, and HIT.     Neuro: A&Ox4. Ambien given at hs.  Cardiac: Sinus rhythm with 1st degree AV block. HM 3 numbers WNL. VSS.    Respiratory: Sating 90s on RA. Uses home CPAP.  GI/: Adequate urine output. Dialysis run this evening. No BM this shift. PRN Senna given.  Diet/appetite: On 2 gram Na diet. Good appetite.  Activity: Up with assist of 1-2 with cane.  Pain: Denies pain  Skin: No new deficits noted.  LDA's: PICC infusing Dobutamine at 3mcg/kg/min; right internal jugular CVC for HD     Plan: Will continue to monitor and notify team accordingly.    Hours of Care: [id number]

## 2021-03-05 ENCOUNTER — APPOINTMENT (OUTPATIENT)
Dept: PHYSICAL THERAPY | Facility: CLINIC | Age: 66
DRG: 870 | End: 2021-03-05
Attending: INTERNAL MEDICINE
Payer: MEDICARE

## 2021-03-05 ENCOUNTER — APPOINTMENT (OUTPATIENT)
Dept: OCCUPATIONAL THERAPY | Facility: CLINIC | Age: 66
DRG: 870 | End: 2021-03-05
Attending: INTERNAL MEDICINE
Payer: MEDICARE

## 2021-03-05 LAB
ALBUMIN SERPL-MCNC: 2.6 G/DL (ref 3.4–5)
ALBUMIN SERPL-MCNC: 2.8 G/DL (ref 3.4–5)
ALP SERPL-CCNC: 121 U/L (ref 40–150)
ALP SERPL-CCNC: 136 U/L (ref 40–150)
ALT SERPL W P-5'-P-CCNC: 26 U/L (ref 0–70)
ALT SERPL W P-5'-P-CCNC: 28 U/L (ref 0–70)
ANION GAP SERPL CALCULATED.3IONS-SCNC: 11 MMOL/L (ref 3–14)
ANION GAP SERPL CALCULATED.3IONS-SCNC: 9 MMOL/L (ref 3–14)
APTT PPP: 42 SEC (ref 22–37)
AST SERPL W P-5'-P-CCNC: 44 U/L (ref 0–45)
AST SERPL W P-5'-P-CCNC: 47 U/L (ref 0–45)
BASOPHILS # BLD AUTO: 0.1 10E9/L (ref 0–0.2)
BASOPHILS NFR BLD AUTO: 0.9 %
BILIRUB SERPL-MCNC: 0.5 MG/DL (ref 0.2–1.3)
BILIRUB SERPL-MCNC: 0.7 MG/DL (ref 0.2–1.3)
BUN SERPL-MCNC: 41 MG/DL (ref 7–30)
BUN SERPL-MCNC: 71 MG/DL (ref 7–30)
CALCIUM SERPL-MCNC: 8.4 MG/DL (ref 8.5–10.1)
CALCIUM SERPL-MCNC: 8.8 MG/DL (ref 8.5–10.1)
CHLORIDE SERPL-SCNC: 95 MMOL/L (ref 94–109)
CHLORIDE SERPL-SCNC: 99 MMOL/L (ref 94–109)
CO2 SERPL-SCNC: 24 MMOL/L (ref 20–32)
CO2 SERPL-SCNC: 25 MMOL/L (ref 20–32)
CREAT SERPL-MCNC: 3.39 MG/DL (ref 0.66–1.25)
CREAT SERPL-MCNC: 5.59 MG/DL (ref 0.66–1.25)
DIFFERENTIAL METHOD BLD: ABNORMAL
EOSINOPHIL # BLD AUTO: 0.2 10E9/L (ref 0–0.7)
EOSINOPHIL NFR BLD AUTO: 3.7 %
ERYTHROCYTE [DISTWIDTH] IN BLOOD BY AUTOMATED COUNT: 23 % (ref 10–15)
GFR SERPL CREATININE-BSD FRML MDRD: 10 ML/MIN/{1.73_M2}
GFR SERPL CREATININE-BSD FRML MDRD: 18 ML/MIN/{1.73_M2}
GLUCOSE BLDC GLUCOMTR-MCNC: 113 MG/DL (ref 70–99)
GLUCOSE BLDC GLUCOMTR-MCNC: 131 MG/DL (ref 70–99)
GLUCOSE BLDC GLUCOMTR-MCNC: 136 MG/DL (ref 70–99)
GLUCOSE BLDC GLUCOMTR-MCNC: 149 MG/DL (ref 70–99)
GLUCOSE BLDC GLUCOMTR-MCNC: 189 MG/DL (ref 70–99)
GLUCOSE SERPL-MCNC: 115 MG/DL (ref 70–99)
GLUCOSE SERPL-MCNC: 161 MG/DL (ref 70–99)
HCT VFR BLD AUTO: 24.8 % (ref 40–53)
HGB BLD-MCNC: 7.7 G/DL (ref 13.3–17.7)
IMM GRANULOCYTES # BLD: 0.1 10E9/L (ref 0–0.4)
IMM GRANULOCYTES NFR BLD: 0.9 %
INR PPP: 2.4 (ref 0.86–1.14)
LDH SERPL L TO P-CCNC: 284 U/L (ref 85–227)
LYMPHOCYTES # BLD AUTO: 0.8 10E9/L (ref 0.8–5.3)
LYMPHOCYTES NFR BLD AUTO: 14.6 %
MCH RBC QN AUTO: 25.2 PG (ref 26.5–33)
MCHC RBC AUTO-ENTMCNC: 31 G/DL (ref 31.5–36.5)
MCV RBC AUTO: 81 FL (ref 78–100)
MONOCYTES # BLD AUTO: 0.7 10E9/L (ref 0–1.3)
MONOCYTES NFR BLD AUTO: 13.5 %
NEUTROPHILS # BLD AUTO: 3.6 10E9/L (ref 1.6–8.3)
NEUTROPHILS NFR BLD AUTO: 66.4 %
NRBC # BLD AUTO: 0 10*3/UL
NRBC BLD AUTO-RTO: 0 /100
PLATELET # BLD AUTO: 210 10E9/L (ref 150–450)
POTASSIUM SERPL-SCNC: 3.6 MMOL/L (ref 3.4–5.3)
POTASSIUM SERPL-SCNC: 3.8 MMOL/L (ref 3.4–5.3)
PROT SERPL-MCNC: 6.9 G/DL (ref 6.8–8.8)
PROT SERPL-MCNC: 7.8 G/DL (ref 6.8–8.8)
RBC # BLD AUTO: 3.06 10E12/L (ref 4.4–5.9)
SODIUM SERPL-SCNC: 129 MMOL/L (ref 133–144)
SODIUM SERPL-SCNC: 133 MMOL/L (ref 133–144)
WBC # BLD AUTO: 5.4 10E9/L (ref 4–11)

## 2021-03-05 PROCEDURE — 250N000013 HC RX MED GY IP 250 OP 250 PS 637

## 2021-03-05 PROCEDURE — 85025 COMPLETE CBC W/AUTO DIFF WBC: CPT | Performed by: INTERNAL MEDICINE

## 2021-03-05 PROCEDURE — 97530 THERAPEUTIC ACTIVITIES: CPT | Mod: GP | Performed by: PHYSICAL THERAPIST

## 2021-03-05 PROCEDURE — 90935 HEMODIALYSIS ONE EVALUATION: CPT | Mod: GC | Performed by: INTERNAL MEDICINE

## 2021-03-05 PROCEDURE — 83615 LACTATE (LD) (LDH) ENZYME: CPT | Performed by: INTERNAL MEDICINE

## 2021-03-05 PROCEDURE — 250N000011 HC RX IP 250 OP 636: Performed by: STUDENT IN AN ORGANIZED HEALTH CARE EDUCATION/TRAINING PROGRAM

## 2021-03-05 PROCEDURE — 250N000013 HC RX MED GY IP 250 OP 250 PS 637: Performed by: STUDENT IN AN ORGANIZED HEALTH CARE EDUCATION/TRAINING PROGRAM

## 2021-03-05 PROCEDURE — 99232 SBSQ HOSP IP/OBS MODERATE 35: CPT | Mod: GC | Performed by: INTERNAL MEDICINE

## 2021-03-05 PROCEDURE — 97116 GAIT TRAINING THERAPY: CPT | Mod: GP | Performed by: PHYSICAL THERAPIST

## 2021-03-05 PROCEDURE — 97110 THERAPEUTIC EXERCISES: CPT | Mod: GO

## 2021-03-05 PROCEDURE — 250N000013 HC RX MED GY IP 250 OP 250 PS 637: Performed by: INTERNAL MEDICINE

## 2021-03-05 PROCEDURE — 85610 PROTHROMBIN TIME: CPT | Performed by: INTERNAL MEDICINE

## 2021-03-05 PROCEDURE — 85730 THROMBOPLASTIN TIME PARTIAL: CPT | Performed by: INTERNAL MEDICINE

## 2021-03-05 PROCEDURE — 999N000044 HC STATISTIC CVC DRESSING CHANGE

## 2021-03-05 PROCEDURE — 258N000003 HC RX IP 258 OP 636: Performed by: STUDENT IN AN ORGANIZED HEALTH CARE EDUCATION/TRAINING PROGRAM

## 2021-03-05 PROCEDURE — 90937 HEMODIALYSIS REPEATED EVAL: CPT

## 2021-03-05 PROCEDURE — 97535 SELF CARE MNGMENT TRAINING: CPT | Mod: GO

## 2021-03-05 PROCEDURE — 80053 COMPREHEN METABOLIC PANEL: CPT | Performed by: INTERNAL MEDICINE

## 2021-03-05 PROCEDURE — 214N000001 HC R&B CCU UMMC

## 2021-03-05 PROCEDURE — 999N001017 HC STATISTIC GLUCOSE BY METER IP

## 2021-03-05 RX ORDER — WARFARIN SODIUM 4 MG/1
4 TABLET ORAL
Status: COMPLETED | OUTPATIENT
Start: 2021-03-05 | End: 2021-03-05

## 2021-03-05 RX ADMIN — TAMSULOSIN HYDROCHLORIDE 0.4 MG: 0.4 CAPSULE ORAL at 08:59

## 2021-03-05 RX ADMIN — HYDRALAZINE HYDROCHLORIDE 100 MG: 100 TABLET, FILM COATED ORAL at 06:44

## 2021-03-05 RX ADMIN — FLUOXETINE 30 MG: 20 CAPSULE ORAL at 08:58

## 2021-03-05 RX ADMIN — DOCUSATE SODIUM 50 MG AND SENNOSIDES 8.6 MG 2 TABLET: 8.6; 5 TABLET, FILM COATED ORAL at 19:16

## 2021-03-05 RX ADMIN — CEPHALEXIN 500 MG: 500 CAPSULE ORAL at 08:58

## 2021-03-05 RX ADMIN — OMEPRAZOLE 20 MG: 20 CAPSULE, DELAYED RELEASE ORAL at 08:58

## 2021-03-05 RX ADMIN — SODIUM CHLORIDE 250 ML: 9 INJECTION, SOLUTION INTRAVENOUS at 13:13

## 2021-03-05 RX ADMIN — HYDRALAZINE HYDROCHLORIDE 125 MG: 100 TABLET, FILM COATED ORAL at 17:00

## 2021-03-05 RX ADMIN — ALLOPURINOL 100 MG: 100 TABLET ORAL at 08:59

## 2021-03-05 RX ADMIN — WARFARIN SODIUM 4 MG: 4 TABLET ORAL at 17:00

## 2021-03-05 RX ADMIN — METHOCARBAMOL 500 MG: 500 TABLET, FILM COATED ORAL at 19:21

## 2021-03-05 RX ADMIN — CEPHALEXIN 500 MG: 500 CAPSULE ORAL at 19:17

## 2021-03-05 RX ADMIN — SODIUM CHLORIDE 300 ML: 9 INJECTION, SOLUTION INTRAVENOUS at 13:13

## 2021-03-05 RX ADMIN — DOCUSATE SODIUM 50 MG AND SENNOSIDES 8.6 MG 2 TABLET: 8.6; 5 TABLET, FILM COATED ORAL at 08:58

## 2021-03-05 RX ADMIN — OXYMETAZOLINE HYDROCHLORIDE 2 SPRAY: 0.05 SPRAY NASAL at 19:17

## 2021-03-05 RX ADMIN — SALINE NASAL SPRAY 1 SPRAY: 1.5 SOLUTION NASAL at 19:17

## 2021-03-05 RX ADMIN — HYDRALAZINE HYDROCHLORIDE 125 MG: 100 TABLET, FILM COATED ORAL at 23:04

## 2021-03-05 RX ADMIN — INSULIN ASPART 1 UNITS: 100 INJECTION, SOLUTION INTRAVENOUS; SUBCUTANEOUS at 11:25

## 2021-03-05 RX ADMIN — Medication 1 CAPSULE: at 08:58

## 2021-03-05 RX ADMIN — ZOLPIDEM TARTRATE 5 MG: 5 TABLET, FILM COATED ORAL at 21:00

## 2021-03-05 RX ADMIN — FINASTERIDE 5 MG: 5 TABLET, FILM COATED ORAL at 08:59

## 2021-03-05 RX ADMIN — AMIODARONE HYDROCHLORIDE 200 MG: 200 TABLET ORAL at 08:58

## 2021-03-05 RX ADMIN — DOBUTAMINE HYDROCHLORIDE 3 MCG/KG/MIN: 200 INJECTION INTRAVENOUS at 06:43

## 2021-03-05 RX ADMIN — OXYMETAZOLINE HYDROCHLORIDE 2 SPRAY: 0.05 SPRAY NASAL at 09:02

## 2021-03-05 RX ADMIN — MELATONIN TAB 3 MG 3 MG: 3 TAB at 21:00

## 2021-03-05 RX ADMIN — BUMETANIDE 4 MG: 2 TABLET ORAL at 08:58

## 2021-03-05 RX ADMIN — ASPIRIN 81 MG CHEWABLE TABLET 81 MG: 81 TABLET CHEWABLE at 08:59

## 2021-03-05 RX ADMIN — Medication: at 13:14

## 2021-03-05 ASSESSMENT — ACTIVITIES OF DAILY LIVING (ADL)
ADLS_ACUITY_SCORE: 15

## 2021-03-05 NOTE — PLAN OF CARE
D: Pt admit 2/15/21 with mixed cardiogenic and distributive shock w/intermittent wide complex tachycardia requiring intubation for hypoxemia, pt also treated for pneumonia. Persistent renal failure requiring HD. PMH NICM (LVEF 15-20%), NYHA III s/p HM3 2/18/2020 s/p ICD 3/16/2020, MSSA driveline infection and bacteremia 11/5/2020, VT, moderate non obstructive CAD, severe MR, Afib on warfarin, HTN, ABHINAV, CKD IV, DM2, HLP, HIT     I/A:   Neuro: A&Ox4. [place]  VS: VSS. RA/home CPAP overnight  LVAD #'s WNL, no alarms this shift. Dressing CDI.  Tele: SR w/ 1st degree AVB  GI/: Urinating into bedside urinal. Pt oliguric on HD. No BM this shift.  Diet: 2g Na diet.   BG/insulin: ACHS BG checks  IV/Drips: R PIV SL. R DL PICC infusing dobutamine gtt 3 mcg/kg/min  Activity: A x1 w/cane. Pt needing reminders to call for staff assistance before getting out of bed  Skin/drains: R internal jugular double lumen CVC CDI.      P: Plan for discharge to ARU when medically stable. Pt also needing plan regarding LVAD and OP dialysis in place before discharge. Continue to monitor pt status and report changes to Cards 2.      Mali Mares RN

## 2021-03-05 NOTE — PROGRESS NOTES
LVAD Social Work Services Progress Note      Date of Initial Social Work Evaluation: 2/16/2021  Collaborated with: Pt's wife, Veronique, via phone ([phone]), LVAD Manager, LVAD coordinator, [facility] RN Manager (Violet [place] [phone])    Data: Pt with hx of LVAD implant 2/18/2020. Pt admitted 2/16/2021 from OSH for cardiogenic and septic shock and multiorgan failure. Pt was extubated 2/20. Pt had tunneled catheter placed on 2/23.   Writer spoke to [facility] Nurse Manager regarding case. Anticipated that their center will be ready early to mid-April. Nurse Manager asking about potential option of home HD or home PD in the future as their center would be able to do teaching for both. The home PD would require at minimum of 5 weeks of teaching, M-F, and PD would require at least one week of training. Writer had reached out to Nephrology earlier this to inquire if pt would be a candidate for PD. Home HD and PD would be ideal for pt due to where he lives and distance he will need to travel to get to a dialysis center.   [facility] dialysis has expressed interest in receiving LVAD training and accept pt. Writer has call out to [facility] ([phone]) to inquire how quickly they would be ready to receive training and accept pt. If it takes just as long to get into [facility] as it does [facility] the preferred facility would be pursuing training for [facility]. Pt and wife would prefer [facility] long-term as goal would be to get him into [facility] and also [facility] would be able to do the education for home HD and PD if this is an option in the future.     Intervention: Discharge Planning   Assessment: Pt's wife understandably frustrated with process for finding and confirming outpatient dialysis plan but at the same time continues to be understanding. Wife expressed concern for pt's mental health should he remain in the hospital for a prolonged period of time away from his family.    Writer is waiting for nephrology to give input if pt would be appropriate for PD.   Education provided by SW: Ongoing Social Work support  Plan:    Discharge Plans in Progress: Continuing to work on establishing outpatient dialysis plan. Earliest [facility] will be LVAD trained and able to accept pt will be Early to Mid April.     Waiting to hear back from [facility] as to timeline in which they would anticipate being ready to accept pt.     Barriers to d/c plan: Outpatient Dialysis Unit     Follow up Plan: Social Work to continue to follow.

## 2021-03-05 NOTE — PROGRESS NOTES
[facility]    Cardiology Progress Note- Cards 2        Date of Admission:  2/15/2021     Today's Plan  - Continue low dose dobutamine   - continue hydralazine 125mg tid  - dialysis as per nephrology   - Bumetanide prn on non-dialysis days  - Continue antibiotics:   -PO cefalexin 500mg Q6h (renally dosed equivalent) for 14 days followed by 500mg bid (renally dosed equivalent)  - ongoing PT/OT  - continue warfarin  - Social work arranging dialysis and placement    Assessment & Plan: SL   Eliseo Tanner is a 65 year old male with PMHx relevant for NICM with LVEF 15-20%, NYHA III s/p HM III 2/18/2020 (post op complicated by retrosternal hematoma with bleeding in the lungs), s/p ICD placed on 3/16/2020, h/o MSSA driveline infection and bacteremia 11/5/2020 on prophylactic cephalexin, h/o VT on amiodarone, moderate nonobstructive CAD, severe MR, atrial fibrillation on warfarin, hypertension, ABHINAV requiring CPAP, CKD IV, DMII, HLP,  h/o HIT who presented to the Cardiovascular Unit (4E Cardiac ICU) with mixed cardiogenic and distributive shock withan intermittent wide complex tachycardia. Initially requiring vasopressors and inotropes, intubated for hypoxemia. Treated for legionella pneumonia. Stabilized on low dose dobutamine off of pressors. Extubated. With persistent oliguric renal failure.       Cardiovascular:     #Mixed Cardiogenic and Septic Shock - Improved  #Multiorgan Failure - Improved  Presented from [facility] with subacute development of shortness of breath, dyspnea on exertion, dizziness/lightheadedness with near syncopal event found to be febrile with intermittent wide complex tachycardia. Recent COVID19 moderna vaccination. CT chest showing bilateral infiltrates. Community acquired pneumonia vs. Possible recurrent of MSSA bacteremia WBC 24.5, Procal 7.95, , Lactacte 6.9. Completed course of IV antibiotics.     Mildly  elevated filling pressures on presentation CVP 18, PA 35/20, PCWP 11. Likely some component of cardiogenic shock. LVAD parameters relatively stable despite markedly elevated LDH 8,531. Euvolemic on exam. Worsening renal function, Cr 4.62, up-trending LFT's/ INR. Legionella antigen positive. Weaned off of vasopressor and maintained on dobutamine. Acute hepatic injury which is improving. Improved renal function but continues to Require dialysis. Shock resolved.      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 Bhatti (ID clinic)   # Troponin elevation (likely demand ischemia)  # Essential Hypertension  # Hyperlipidemia        Patient with long standing history of NICM previously implanted LVAD (HM III) on 02/18/20 due to worsening functional status and systolic ventricular dysfunction (further complicated by RV dysfunction by VAD hemodynamic compensatory mechanism, VT in ICU now on Amiodarone and Atrial Fibrillation with AVR requiring DCCV on 02/28/20).      Elevated cardiac biomarkers on presentation, SGC with only mildly elevated filling pressures. Euvolemic on exam. Troponin elevation likely related to demand ischemia with no concerns for ACS. Most recent VAD interrogation without any significant Flow-Alarms     LDH 8,531. Initial concern for LVAD thrombus. However given relatively stable LVAD parameters, degree of LDH elevation is out of proportion. Will continue to monitor for LVAD alarms.      - continue dobutamine for renal recovery  - Held ACE-I/ARB/ARNi: Lisinopril; due to acute renal failure  - No BB; deferred 2/2  shock  - Aldosterone antagonist: deferred while other medical therapy is optimized  - SCD prophylaxis: ICD  - Fluid status : hypervolemic, IV diuretic today, further management per dialysis  - MAP Goal: 65-90  - On Warfarin for HM-III INR Goal 2-3  - Continue Hydralazine  - Continue ASA 81 mg per oral daily       # Wide complex tachycardia. Atrial Flutter vs Afib vs Ventricular Tachycardia  # History of Atrial Fibrillation s/p DCCV/history of Atrial Flutter       Wide complex tachycardia HR 140s on presentation in the setting of febrile illness and likely pneumonia. Did not initially respond to adenosine. Concern for VT. Intermittently back into HR 60s with underlying rhythm of atrial flutter 3:1 conduction block. Per EP can not rule out VT, may be dual tachycardias. Has been in persistent atrial flutter. S/p CHAMP+DCCV, sucessfully maintaining NSR.       - continue home amiodarone  - Ensure K+ >4.0 mEq/L and Mg+2 >2.0  - continue warfarin  - On cardiac telemetry     Pulmonary  #Legionella Pneumonia - resolved  Hypoxemic in the setting of mixed cardiogenic septic shock requiring emergent intubation. O2 requirements quickly improved. S/p extubation on room air.     Infectious disease     #Sepsis  #Legionella Pneumonia  #Bilateral pulmonary infiltrates  CT showing bilateral diffuse patchy consolidations concerning for infectious process. Low suspicion for recurrent driveline or possible hardware infection (history of MSSA Chronic Driveline Infection). Urine without pyuria. Urine legionella ag positive.   - f/u blood cultures  - continue Abx as per ID              -Cefazolin (2/18/21 - 3/1/21) followed by PO cefalexin              Azithromycin (2/15/21 - 2/25/21)     Renal:  # Acute on Chronic CKD stage IV likely 2/2 Septic Shock   s/p L TDC receiving IHD  - IHD per nephrology  - Daily Weight's  - Strict I/O's  - Daily BMP's  - Avoid any additional nephrotoxicity   - intermittent bumetanide on non-dialysis days     GI  #  Shock Liver   # Congestive Hepatopathy - resolved  Hepatoccelular pattern of liver injury  > 3,496, AST 1,441 > 8,490 likely 2/2 to septic shock. INR downtrended s/p 3mg IV vit K. RUQ US without portal vein thrombus. LFTs downtrended nicely. No evidence of synthetic dysfunction.      Hematological   # Chronic Microcytic/Hypochromic Anemia (likely related to iron deficiency)  # Coagulopathy related to sepsis   # IgG Kappa monoclonal Gammopathy of undetermined significance  - Monitor Hgb (baseline 8-9g/dL)  - transfuse for Hb < 7.0   - Patient follows with  and [facility] (Dr. Ibarra)     # Previous Concern For HIT  On previous hospitalization for LVAD placement (02/06/20-03/20/20), concern for HIT. Platelets 250K --> ~ 90K wuth documented history of exposure to unfractionate heparin products at OSH prior to his installation at the [facility].     At that time, patient underwent two HIT panel tests (first one was negative and second antibody test was positive). No known thrombosis events. DAVINA antibody was negative raising question about validity of diagnosis . Per hematology at the time (Dr. James), no other cause for isolated thrombocytopenia. Immediate recovery of plts following d/c of heparin. 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      - avoiding heparin products  - continue warfarin     Endocrine  # Type II DM     > Last Hgb A1C: 6.8 (01/06/21)     - Continue PTA Insulin Basaglar 10 Units Aq at bedtime  - On Medium sliding scale insulin  - Oral Hypoglycemia Protocol      Neurologic:  # Disorientation/Toxic Metabolic Encephalopathy - Resolved  # Chronic Intracranial Small Vessel Disease        Patient reported some lightheadedness/dizziness and disorientation the days prior to hospitalization while at home. However, no reports of LOC, seizure activity, focal deficits, or findings for acute intracranial pathology on most recent OSH CT head w/o contrast (02/15/21). Mental status  improved, s/p extubation        DVT Prophylaxis: therapeutic warfarin  Guevara Catheter: not present  Code Status: Full Code  Fluids: no IVFs  Lines: RIJ TDC (2/23)    Disposition Plan   Pending establishment of long-term dialysis needs      Entered: Daniel Fleming MD 03/05/2021, 5:58 PM       The patient's care was discussed with the Attending Physician, Dr. Jiang.    Daniel Fleming MD   Cardiology Fellow  [facility] [id number] [place] [address]  Please see sign in/sign out for up to date coverage information  ______________________________________________________________________    Interval History   No complaints. No shortness of breath, palpitations, fevers or chills.     Data reviewed today: I reviewed all medications, new labs and imaging results over the last 24 hours.    Physical Exam   Vital Signs: Temp: 98.5  F (36.9  C) Temp src: Oral BP: 115/85 Pulse: 74   Resp: 18 SpO2: 96 % O2 Device: None (Room air)    Weight: 208 lbs 12.41 oz   Gen: awake and in NAD  HEENT: gauze in Left nares, RIJ TDC in place. ~7cm JVP  Heart: LVAD hum.   Lungs: Bilateral crackles. No wheezes  GI: Soft, nontender, nondistended.   Extremities: WWP, 1+ R>L edema  Neuro: grossly non focal  Skin: no rashes.    Data   Recent Labs   Lab 03/05/21  0400 03/04/21  1730 03/04/21  0405 03/03/21  0455 03/03/21  0455   WBC 5.4  --  5.6  --  7.0   HGB 7.7*  --  8.2*  --  8.1*   MCV 81  --  81  --  80     --  224  --  236   INR 2.40*  --  2.23*  --  2.10*   * 128* 132*   < > 127*   POTASSIUM 3.8 3.9 3.9   < > 3.8   CHLORIDE 95 93* 98   < > 93*   CO2 24 24 27   < > 22   BUN 71* 63* 55*   < > 91*   CR 5.59* 4.91* 4.81*   < > 7.08*   ANIONGAP 11 11 7   < > 13   CALOS 8.4* 8.2* 8.6   < > 8.0*   * 199* 104*   < > 104*   ALBUMIN 2.6* 2.8* 2.6*   < > 2.6*   PROTTOTAL 6.9 7.6 7.2   < > 7.0   BILITOTAL 0.5 0.5 0.6   < > 0.5   ALKPHOS 121 134 120   < > 122   ALT 26 31 29   < > 33   AST 44 49* 45   < > 48*     < > = values in this interval not displayed.     No results found for this or any previous visit (from the past 24 hour(s)).  Medications     dextrose       dextrose Stopped (02/17/21 1600)     DOBUTamine 3 mcg/kg/min (03/05/21 0643)     Warfarin Therapy Reminder         allopurinol  100 mg Oral or Feeding Tube Daily     amiodarone  200 mg Oral Daily     aspirin  81 mg Oral Daily     bumetanide  4 mg Oral Daily     cephALEXin  500 mg Oral Q12H BRITTANI     finasteride  5 mg Oral Daily     FLUoxetine  30 mg Oral Daily     hydrALAZINE  125 mg Oral Q8H BRITTANI     insulin aspart  1-7 Units Subcutaneous TID AC     insulin aspart  1-5 Units Subcutaneous At Bedtime     melatonin  3 mg Oral At Bedtime     multivitamin RENAL  1 capsule Oral Daily     omeprazole  20 mg Oral QAM AC     oxymetazoline  2 spray Both Nostrils BID     senna-docusate  2 tablet Oral BID     tamsulosin  0.4 mg Oral Daily

## 2021-03-05 NOTE — PROGRESS NOTES
.    Nephrology Progress Note  03/05/2021         Assessment & Recommendations:   66 yo M with PMHx  NICM  s/p HM III , VT, severe MR, atrial fibrillation on warfarin, hypertension, CKD IV, DMII, HIT presented to OSH with fevers and cough in the setting of syncopal episode transferred to [facility] for further cares. Hospitalization complicated by Afib with RVR with hypotension and intubation with upgrade of cares to the ICU. Found to be in septic shock 2/2 legionella pneumonia with possible cardiogenic shock. Nephrology was consulted for evaluation and management of anuric BERNARDA. Started on RRT 2/16, remains dialysis dependent.      Dialysis dependent non-oligouric BERNARDA 2/2 ischemic ATN  Baseline Cr last yr s/p LVAD placement was around 1. On admission ~2.3 and doubled within 24 hours with rapid decrease in UOP. UA unimpressive with baseline proteinuria and new granular casts. Initiated on RRT 2/16. Had initially hoped he would have enough renal recovery to avoid dialysis, however he remains dialysis dependent.   - HD MWF   - Continue bumex 4 mg BID  - Avoid nephrotoxins as able  - Renally dose meds  - Monitor I/Os  - Appreciate SW & Care Coordinator assistance in arranging outpatient dialysis    Seen on dialysis. Aim was 1.5 off and we were successful. There were not issues. Blood pressure was stable. He had no chest pain or pressure, no SOB. No cramping.     Volume status  Euvolemic to slight hypervolemic. Tolerated UF 2L without any issues 3/3.  - Daily weights, Is/Os  - UF 1-1.5L as tolerated    Electrolytes   Mild Hyponatremia - CTM    Acid/base - stable   Anemia - Hb 8.2, stable. No s/s of bleeding.     Recommendations were communicated to primary team via note     Seen and discussed with Dr. Hunter Ag MD  [phone]    I have seen and examined the patient and reviewed all current labs and findings. I concur with the assessment and plan as it is outlined. Any changes to the note made by me are in bold.  Char Harrison MD MS FNKF      Interval History :   Nursing and provider notes from last 24 hours reviewed.  Feels well today. Seen on dialysis run. No SOB, no chest pain.     Review of Systems:   4pt ROS negative except as above in HPI    Physical Exam:   I/O last 3 completed shifts:  In: 805.85 [P.O.:590; I.V.:215.85]  Out: 2225 [Urine:2225]   /87   Pulse 71   Temp 97.8  F (36.6  C) (Oral)   Resp 20   Wt 94.7 kg (208 lb 12.4 oz)   SpO2 97%   BMI 32.70 kg/m       General : Pt awake, NAD  Lungs : No increased WOB. No appreciable crackles on the lung fields  Cardiac : LVAD in place  Abdomen : Soft/ND/NT  LE : trace edema in bilateral LE  Dialysis Access : tunneled RIJ catheter    Labs:   All labs reviewed by me  Electrolytes/Renal -   Recent Labs   Lab Test 03/05/21  0400 03/04/21  1730 03/04/21  0405 03/02/21  0401 03/02/21  0401 02/22/21  1539 02/22/21  1539 02/22/21  0353 02/22/21  0353 02/21/21  1617 02/21/21  1617   * 128* 132*   < > 127*   < >  --    < > 136   < >  --    POTASSIUM 3.8 3.9 3.9   < > 3.9   < >  --    < > 4.7   < >  --    CHLORIDE 95 93* 98   < > 93*   < >  --    < > 106   < >  --    CO2 24 24 27   < > 23   < >  --    < > 24   < >  --    BUN 71* 63* 55*   < > 73*   < >  --    < > 30   < >  --    CR 5.59* 4.91* 4.81*   < > 6.41*   < >  --    < > 1.94*   < >  --    * 199* 104*   < > 126*   < >  --    < > 109*   < >  --    CALOS 8.4* 8.2* 8.6   < > 7.9*   < >  --    < > 8.1*   < >  --    MAG  --   --   --   --  1.7  --  2.6*  --  2.5*  --  2.6*   PHOS  --   --   --   --   --   --  5.2*  --  4.4  --  4.5    < > = values in this interval not displayed.       CBC -   Recent Labs   Lab Test 03/05/21 0400 03/04/21  0405 03/03/21  0455   WBC 5.4 5.6 7.0   HGB 7.7* 8.2* 8.1*    224 236       LFTs -   Recent Labs   Lab Test 03/05/21 0400 03/04/21  1730 03/04/21  0405   ALKPHOS 121 134 120   BILITOTAL 0.5 0.5 0.6   ALT 26 31 29   AST 44 49* 45   PROTTOTAL 6.9 7.6 7.2   ALBUMIN  2.6* 2.8* 2.6*       Iron Panel -   Recent Labs   Lab Test 02/27/21  0415 11/16/20  0616 02/08/20  0408   IRON 28* 16* 25*   IRONSAT 9* 6* 8*   HEAVENLY 276 75 189       Imaging:    Current Medications:    sodium chloride (PF) 0.9%  3 mL Intracatheter During Hemodialysis (from stock)     sodium chloride (PF) 0.9%  3 mL Intracatheter During Hemodialysis (from stock)     allopurinol  100 mg Oral or Feeding Tube Daily     amiodarone  200 mg Oral Daily     aspirin  81 mg Oral Daily     bumetanide  4 mg Oral Daily     cephALEXin  500 mg Oral Q12H BRITTANI     finasteride  5 mg Oral Daily     FLUoxetine  30 mg Oral Daily     gelatin absorbable  1 each Topical During Hemodialysis (from stock)     hydrALAZINE  125 mg Oral Q8H BRITTANI     insulin aspart  1-7 Units Subcutaneous TID AC     insulin aspart  1-5 Units Subcutaneous At Bedtime     melatonin  3 mg Oral At Bedtime     multivitamin RENAL  1 capsule Oral Daily     omeprazole  20 mg Oral QAM AC     oxymetazoline  2 spray Both Nostrils BID     senna-docusate  2 tablet Oral BID     sodium chloride (PF)  9 mL Intracatheter During Hemodialysis (from stock)     sodium chloride (PF)  9 mL Intracatheter During Hemodialysis (from stock)     tamsulosin  0.4 mg Oral Daily     warfarin ANTICOAGULANT  4 mg Oral ONCE at 18:00       dextrose       dextrose Stopped (02/17/21 1600)     DOBUTamine 3 mcg/kg/min (03/05/21 0643)     Warfarin Therapy Reminder       Wendy Ag MD

## 2021-03-05 NOTE — PROGRESS NOTES
HEMODIALYSIS TREATMENT NOTE    Date: 3/5/2021  Time: 4:35 PM    Data:  Pre Wt: 94.7 kg (208 lb 12.4 oz)   Desired Wt: 93.2 kg   Post Wt: 93.2 kg (205 lb 7.5 oz)  Weight change: 1.5 kg  Ultrafiltration - Post Run Net Total Removed (mL): 1500 mL  Vascular Access Status: CVC  patent  Dialyzer Rinse: Light  Total Blood Volume Processed: 61.85 L   Total Dialysis (Treatment) Time: 3 hrs   Dialysate Bath: K 3, Ca 3  Heparin: None    Lab:   No    Assessment:  Assumed cares of pt from Murphy Duque RN with 1.5 hrs remaining. Resting in bed. Offered no c/o. Tolerated run well, able to pull ordered amount. PCAD R chest intact, patent, good flow. Denied pain. LVAD in operation.     Plan:    Returned to room on 6C, gave in-person report. Next run per nephrology.    Nate Franks, TITON, RN

## 2021-03-05 NOTE — PLAN OF CARE
Pain: Denies  Neuro: A&Ox4. Calm and cooperative throughout the shift.   Cardiac: Sinus Rhythm 1st degree av block. VSS.   Respiratory: Room air. Tolerating well.   GI/: No BM. Passing flatulence.   Skin: Intact. LVAD dressing changed by nursing staff.   Activity: Stand-by assist tolerating well.     Plan: discharge to ARU when medically appropriate & LVAD/Dialysis plan is aligned.     Brenda Mandel RN

## 2021-03-06 ENCOUNTER — APPOINTMENT (OUTPATIENT)
Dept: OCCUPATIONAL THERAPY | Facility: CLINIC | Age: 66
DRG: 870 | End: 2021-03-06
Attending: INTERNAL MEDICINE
Payer: MEDICARE

## 2021-03-06 LAB
ALBUMIN SERPL-MCNC: 2.6 G/DL (ref 3.4–5)
ALP SERPL-CCNC: 121 U/L (ref 40–150)
ALT SERPL W P-5'-P-CCNC: 25 U/L (ref 0–70)
ANION GAP SERPL CALCULATED.3IONS-SCNC: 9 MMOL/L (ref 3–14)
APTT PPP: 44 SEC (ref 22–37)
AST SERPL W P-5'-P-CCNC: 44 U/L (ref 0–45)
BASOPHILS # BLD AUTO: 0.1 10E9/L (ref 0–0.2)
BASOPHILS NFR BLD AUTO: 1.1 %
BILIRUB SERPL-MCNC: 0.5 MG/DL (ref 0.2–1.3)
BUN SERPL-MCNC: 51 MG/DL (ref 7–30)
CALCIUM SERPL-MCNC: 8.4 MG/DL (ref 8.5–10.1)
CHLORIDE SERPL-SCNC: 98 MMOL/L (ref 94–109)
CO2 SERPL-SCNC: 26 MMOL/L (ref 20–32)
CREAT SERPL-MCNC: 3.93 MG/DL (ref 0.66–1.25)
DIFFERENTIAL METHOD BLD: ABNORMAL
EOSINOPHIL # BLD AUTO: 0.2 10E9/L (ref 0–0.7)
EOSINOPHIL NFR BLD AUTO: 3.9 %
ERYTHROCYTE [DISTWIDTH] IN BLOOD BY AUTOMATED COUNT: 22.7 % (ref 10–15)
GFR SERPL CREATININE-BSD FRML MDRD: 15 ML/MIN/{1.73_M2}
GLUCOSE BLDC GLUCOMTR-MCNC: 135 MG/DL (ref 70–99)
GLUCOSE BLDC GLUCOMTR-MCNC: 160 MG/DL (ref 70–99)
GLUCOSE BLDC GLUCOMTR-MCNC: 178 MG/DL (ref 70–99)
GLUCOSE SERPL-MCNC: 109 MG/DL (ref 70–99)
HCT VFR BLD AUTO: 25.3 % (ref 40–53)
HGB BLD-MCNC: 8 G/DL (ref 13.3–17.7)
IMM GRANULOCYTES # BLD: 0 10E9/L (ref 0–0.4)
IMM GRANULOCYTES NFR BLD: 0.9 %
INR PPP: 2.43 (ref 0.86–1.14)
LDH SERPL L TO P-CCNC: 285 U/L (ref 85–227)
LYMPHOCYTES # BLD AUTO: 0.7 10E9/L (ref 0.8–5.3)
LYMPHOCYTES NFR BLD AUTO: 16.5 %
MAGNESIUM SERPL-MCNC: 1.5 MG/DL (ref 1.6–2.3)
MCH RBC QN AUTO: 26.1 PG (ref 26.5–33)
MCHC RBC AUTO-ENTMCNC: 31.6 G/DL (ref 31.5–36.5)
MCV RBC AUTO: 82 FL (ref 78–100)
MONOCYTES # BLD AUTO: 0.6 10E9/L (ref 0–1.3)
MONOCYTES NFR BLD AUTO: 14.6 %
NEUTROPHILS # BLD AUTO: 2.8 10E9/L (ref 1.6–8.3)
NEUTROPHILS NFR BLD AUTO: 63 %
NRBC # BLD AUTO: 0 10*3/UL
NRBC BLD AUTO-RTO: 0 /100
PLATELET # BLD AUTO: 196 10E9/L (ref 150–450)
POTASSIUM SERPL-SCNC: 3.8 MMOL/L (ref 3.4–5.3)
PROT SERPL-MCNC: 7 G/DL (ref 6.8–8.8)
RBC # BLD AUTO: 3.07 10E12/L (ref 4.4–5.9)
SODIUM SERPL-SCNC: 133 MMOL/L (ref 133–144)
WBC # BLD AUTO: 4.4 10E9/L (ref 4–11)

## 2021-03-06 PROCEDURE — 214N000001 HC R&B CCU UMMC

## 2021-03-06 PROCEDURE — 97535 SELF CARE MNGMENT TRAINING: CPT | Mod: GO

## 2021-03-06 PROCEDURE — 250N000013 HC RX MED GY IP 250 OP 250 PS 637: Performed by: STUDENT IN AN ORGANIZED HEALTH CARE EDUCATION/TRAINING PROGRAM

## 2021-03-06 PROCEDURE — 250N000013 HC RX MED GY IP 250 OP 250 PS 637: Performed by: INTERNAL MEDICINE

## 2021-03-06 PROCEDURE — 83735 ASSAY OF MAGNESIUM: CPT | Performed by: INTERNAL MEDICINE

## 2021-03-06 PROCEDURE — 99232 SBSQ HOSP IP/OBS MODERATE 35: CPT | Mod: GC | Performed by: INTERNAL MEDICINE

## 2021-03-06 PROCEDURE — 999N001017 HC STATISTIC GLUCOSE BY METER IP

## 2021-03-06 PROCEDURE — 97110 THERAPEUTIC EXERCISES: CPT | Mod: GO

## 2021-03-06 PROCEDURE — 80053 COMPREHEN METABOLIC PANEL: CPT | Performed by: INTERNAL MEDICINE

## 2021-03-06 PROCEDURE — 85025 COMPLETE CBC W/AUTO DIFF WBC: CPT | Performed by: INTERNAL MEDICINE

## 2021-03-06 PROCEDURE — 250N000011 HC RX IP 250 OP 636: Performed by: STUDENT IN AN ORGANIZED HEALTH CARE EDUCATION/TRAINING PROGRAM

## 2021-03-06 PROCEDURE — 85730 THROMBOPLASTIN TIME PARTIAL: CPT | Performed by: INTERNAL MEDICINE

## 2021-03-06 PROCEDURE — 250N000013 HC RX MED GY IP 250 OP 250 PS 637

## 2021-03-06 PROCEDURE — 83615 LACTATE (LD) (LDH) ENZYME: CPT | Performed by: INTERNAL MEDICINE

## 2021-03-06 PROCEDURE — 85610 PROTHROMBIN TIME: CPT | Performed by: INTERNAL MEDICINE

## 2021-03-06 RX ORDER — MAGNESIUM OXIDE 400 MG/1
400 TABLET ORAL 2 TIMES DAILY
Status: DISCONTINUED | OUTPATIENT
Start: 2021-03-06 | End: 2021-03-07

## 2021-03-06 RX ORDER — WARFARIN SODIUM 4 MG/1
4 TABLET ORAL
Status: COMPLETED | OUTPATIENT
Start: 2021-03-06 | End: 2021-03-06

## 2021-03-06 RX ADMIN — INSULIN ASPART 1 UNITS: 100 INJECTION, SOLUTION INTRAVENOUS; SUBCUTANEOUS at 17:13

## 2021-03-06 RX ADMIN — MELATONIN TAB 3 MG 3 MG: 3 TAB at 21:30

## 2021-03-06 RX ADMIN — FLUOXETINE 30 MG: 20 CAPSULE ORAL at 08:07

## 2021-03-06 RX ADMIN — BUMETANIDE 4 MG: 2 TABLET ORAL at 08:06

## 2021-03-06 RX ADMIN — TAMSULOSIN HYDROCHLORIDE 0.4 MG: 0.4 CAPSULE ORAL at 08:07

## 2021-03-06 RX ADMIN — ZOLPIDEM TARTRATE 5 MG: 5 TABLET, FILM COATED ORAL at 21:30

## 2021-03-06 RX ADMIN — CEPHALEXIN 500 MG: 500 CAPSULE ORAL at 21:30

## 2021-03-06 RX ADMIN — WARFARIN SODIUM 4 MG: 4 TABLET ORAL at 17:13

## 2021-03-06 RX ADMIN — Medication 1 CAPSULE: at 08:06

## 2021-03-06 RX ADMIN — DOBUTAMINE HYDROCHLORIDE 3 MCG/KG/MIN: 200 INJECTION INTRAVENOUS at 09:28

## 2021-03-06 RX ADMIN — HYDRALAZINE HYDROCHLORIDE 125 MG: 100 TABLET, FILM COATED ORAL at 13:59

## 2021-03-06 RX ADMIN — HYDRALAZINE HYDROCHLORIDE 125 MG: 100 TABLET, FILM COATED ORAL at 21:29

## 2021-03-06 RX ADMIN — OMEPRAZOLE 20 MG: 20 CAPSULE, DELAYED RELEASE ORAL at 08:11

## 2021-03-06 RX ADMIN — Medication 400 MG: at 09:28

## 2021-03-06 RX ADMIN — CEPHALEXIN 500 MG: 500 CAPSULE ORAL at 08:06

## 2021-03-06 RX ADMIN — AMIODARONE HYDROCHLORIDE 200 MG: 200 TABLET ORAL at 08:07

## 2021-03-06 RX ADMIN — OXYMETAZOLINE HYDROCHLORIDE 2 SPRAY: 0.05 SPRAY NASAL at 21:31

## 2021-03-06 RX ADMIN — FINASTERIDE 5 MG: 5 TABLET, FILM COATED ORAL at 08:06

## 2021-03-06 RX ADMIN — ASPIRIN 81 MG CHEWABLE TABLET 81 MG: 81 TABLET CHEWABLE at 08:07

## 2021-03-06 RX ADMIN — HYDRALAZINE HYDROCHLORIDE 125 MG: 100 TABLET, FILM COATED ORAL at 06:04

## 2021-03-06 RX ADMIN — ALLOPURINOL 100 MG: 100 TABLET ORAL at 08:07

## 2021-03-06 RX ADMIN — OXYMETAZOLINE HYDROCHLORIDE 2 SPRAY: 0.05 SPRAY NASAL at 08:07

## 2021-03-06 RX ADMIN — Medication 400 MG: at 21:30

## 2021-03-06 RX ADMIN — DOCUSATE SODIUM 50 MG AND SENNOSIDES 8.6 MG 2 TABLET: 8.6; 5 TABLET, FILM COATED ORAL at 08:08

## 2021-03-06 RX ADMIN — DOCUSATE SODIUM 50 MG AND SENNOSIDES 8.6 MG 2 TABLET: 8.6; 5 TABLET, FILM COATED ORAL at 21:30

## 2021-03-06 ASSESSMENT — ACTIVITIES OF DAILY LIVING (ADL)
ADLS_ACUITY_SCORE: 15

## 2021-03-06 NOTE — PROGRESS NOTES
Nephrology Progress Note    Following for BERNARDA, on iHD. Chart reviewed and pt examined, has 1+ edema on BLE which is overall stable and has been improving with HD. Denies any other concerns.   - Labs reviewed and no indication for HD, will continue to assess daily and plan for likely next run on Mon.   - Continue PO diuretic.   - Compression socks will also be helpful.    Anali Bledsoe MD  Renal Fellow  [phone]

## 2021-03-06 NOTE — PROGRESS NOTES
[facility]    Cardiology Progress Note- Cards 2    Date of Admission:  2/15/2021     Today's Plan  - Continue low dose dobutamine   - continue hydralazine 125mg tid  - dialysis as per nephrology   - Bumetanide prn on non-dialysis days  - Continue antibiotics:   -PO cefalexin 500mg Q6h (renally dosed equivalent) for 14 days followed by 500mg bid (renally dosed equivalent)  - ongoing PT/OT  - continue warfarin  - Social work arranging dialysis and placement    Assessment & Plan: SL   Eliseo Tanner is a 65 year old male with PMHx relevant for NICM with LVEF 15-20%, NYHA III s/p HM III 2/18/2020 (post op complicated by retrosternal hematoma with bleeding in the lungs), s/p ICD placed on 3/16/2020, h/o MSSA driveline infection and bacteremia 11/5/2020 on prophylactic cephalexin, h/o VT on amiodarone, moderate nonobstructive CAD, severe MR, atrial fibrillation on warfarin, hypertension, ABHINAV requiring CPAP, CKD IV, DMII, HLP,  h/o HIT who presented to the Cardiovascular Unit (4E Cardiac ICU) with mixed cardiogenic and distributive shock withan intermittent wide complex tachycardia. Initially requiring vasopressors and inotropes, intubated for hypoxemia. Treated for legionella pneumonia. Stabilized on low dose dobutamine off of pressors. Extubated. With persistent oliguric renal failure.       Cardiovascular:     #Mixed Cardiogenic and Septic Shock - Improved  #Multiorgan Failure - Improved  Presented from [facility] with subacute development of shortness of breath, dyspnea on exertion, dizziness/lightheadedness with near syncopal event found to be febrile with intermittent wide complex tachycardia. Recent COVID19 moderna vaccination. CT chest showing bilateral infiltrates. Community acquired pneumonia vs. Possible recurrent of MSSA bacteremia WBC 24.5, Procal 7.95, , Lactacte 6.9. Completed course of IV antibiotics.     Mildly elevated  filling pressures on presentation CVP 18, PA 35/20, PCWP 11. Likely some component of cardiogenic shock. LVAD parameters relatively stable despite markedly elevated LDH 8,531. Euvolemic on exam. Worsening renal function, Cr 4.62, up-trending LFT's/ INR. Legionella antigen positive. Weaned off of vasopressor and maintained on dobutamine. Acute hepatic injury which is improving. Improved renal function but continues to Require dialysis. Shock resolved.      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 Bhatti (ID clinic)   # Troponin elevation (likely demand ischemia)  # Essential Hypertension  # Hyperlipidemia        Patient with long standing history of NICM previously implanted LVAD (HM III) on 02/18/20 due to worsening functional status and systolic ventricular dysfunction (further complicated by RV dysfunction by VAD hemodynamic compensatory mechanism, VT in ICU now on Amiodarone and Atrial Fibrillation with AVR requiring DCCV on 02/28/20).      Elevated cardiac biomarkers on presentation, SGC with only mildly elevated filling pressures. Euvolemic on exam. Troponin elevation likely related to demand ischemia with no concerns for ACS. Most recent VAD interrogation without any significant Flow-Alarms     LDH 8,531. Initial concern for LVAD thrombus. However given relatively stable LVAD parameters, degree of LDH elevation is out of proportion. Will continue to monitor for LVAD alarms.      - continue dobutamine for renal recovery  - Held ACE-I/ARB/ARNi: Lisinopril; due to acute renal failure  - No BB; deferred 2/2 shock  - Aldosterone  antagonist: deferred while other medical therapy is optimized  - SCD prophylaxis: ICD  - Fluid status : hypervolemic, IV diuretic today, further management per dialysis  - MAP Goal: 65-90  - On Warfarin for HM-III INR Goal 2-3  - Continue Hydralazine  - Continue ASA 81 mg per oral daily       # Wide complex tachycardia. Atrial Flutter vs Afib vs Ventricular Tachycardia  # History of Atrial Fibrillation s/p DCCV/history of Atrial Flutter       Wide complex tachycardia HR 140s on presentation in the setting of febrile illness and likely pneumonia. Did not initially respond to adenosine. Concern for VT. Intermittently back into HR 60s with underlying rhythm of atrial flutter 3:1 conduction block. Per EP can not rule out VT, may be dual tachycardias. Has been in persistent atrial flutter. S/p CHAMP+DCCV, sucessfully maintaining NSR.       - continue home amiodarone  - Ensure K+ >4.0 mEq/L and Mg+2 >2.0  - continue warfarin  - On cardiac telemetry     Pulmonary  #Legionella Pneumonia - resolved  Hypoxemic in the setting of mixed cardiogenic septic shock requiring emergent intubation. O2 requirements quickly improved. S/p extubation on room air.     Infectious disease     #Sepsis  #Legionella Pneumonia  #Bilateral pulmonary infiltrates  CT showing bilateral diffuse patchy consolidations concerning for infectious process. Low suspicion for recurrent driveline or possible hardware infection (history of MSSA Chronic Driveline Infection). Urine without pyuria. Urine legionella ag positive.   - f/u blood cultures  - continue Abx as per ID              -Cefazolin (2/18/21 - 3/1/21) followed by PO cefalexin              Azithromycin (2/15/21 - 2/25/21)     Renal:  # Acute on Chronic CKD stage IV likely 2/2 Septic Shock   s/p L TDC receiving IHD  - IHD per nephrology  - Daily Weight's  - Strict I/O's  - Daily BMP's  - Avoid any additional nephrotoxicity   - intermittent bumetanide on non-dialysis days     GI  # Shock Liver   #  Congestive Hepatopathy - resolved  Hepatoccelular pattern of liver injury  > 3,496, AST 1,441 > 8,490 likely 2/2 to septic shock. INR downtrended s/p 3mg IV vit K. RUQ US without portal vein thrombus. LFTs downtrended nicely. No evidence of synthetic dysfunction.      Hematological   # Chronic Microcytic/Hypochromic Anemia (likely related to iron deficiency)  # Coagulopathy related to sepsis   # IgG Kappa monoclonal Gammopathy of undetermined significance  - Monitor Hgb (baseline 8-9g/dL)  - transfuse for Hb < 7.0   - Patient follows with [facility] (Dr. Ibarra)     # Previous Concern For HIT  On previous hospitalization for LVAD placement (02/06/20-03/20/20), concern for HIT. Platelets 250K --> ~ 90K wuth documented history of exposure to unfractionate heparin products at OSH prior to his installation at the [facility].     At that time, patient underwent two HIT panel tests (first one was negative and second antibody test was positive). No known thrombosis events. DAVINA antibody was negative raising question about validity of diagnosis . Per hematology at the time (Dr. James), no other cause for isolated thrombocytopenia. Immediate recovery of plts following d/c of heparin. 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      - avoiding heparin products  - continue warfarin     Endocrine  # Type II DM     > Last Hgb A1C: 6.8 (01/06/21)     - Continue PTA Insulin Basaglar 10 Units Aq at bedtime  - On Medium sliding scale insulin  - Oral Hypoglycemia Protocol      Neurologic:  # Disorientation/Toxic Metabolic Encephalopathy - Resolved  # Chronic Intracranial Small Vessel Disease        Patient reported some lightheadedness/dizziness and disorientation the days prior to hospitalization while at home. However, no reports of LOC, seizure activity, focal deficits, or findings for acute intracranial pathology on most recent OSH CT head w/o contrast (02/15/21). Mental status improved, s/p  extubation        DVT Prophylaxis: therapeutic warfarin  Guevara Catheter: not present  Code Status: Full Code  Fluids: no IVFs  Lines: RIJ TDC (2/23)    Disposition Plan   Pending establishment of long-term dialysis needs      Entered: Pineda Damian MD 03/06/2021, 4:05 PM       The patient's care was discussed with Dr. Jiang.    Pineda Damian MD   [facility] [address]  [facility]  Please see sign in/sign out for up to date coverage information  ______________________________________________________________________    Interval History   No complaints. No shortness of breath, palpitations, fevers or chills.     Data reviewed today: I reviewed all medications, new labs and imaging results over the last 24 hours.    Physical Exam   Vital Signs: Temp: 98  F (36.7  C) Temp src: Oral BP: (!) 110/94 Pulse: 72   Resp: 18 SpO2: 95 % O2 Device: None (Room air)    Weight: 207 lbs 3.2 oz   Gen: awake and in NAD  HEENT: gauze in Left nares, RIJ TDC in place. JVP 10 cm  Heart: LVAD hum.   Lungs: Bilateral crackles. No wheezes  GI: Soft, nontender, nondistended.   Extremities: WWP, Trace LE edema  Neuro: grossly non focal  Skin: no rashes.    Data   Recent Labs   Lab 03/06/21  0430 03/05/21  1931 03/05/21  0400 03/04/21  0405 03/04/21  0405   WBC 4.4  --  5.4  --  5.6   HGB 8.0*  --  7.7*  --  8.2*   MCV 82  --  81  --  81     --  210  --  224   INR 2.43*  --  2.40*  --  2.23*    133 129*   < > 132*   POTASSIUM 3.8 3.6 3.8   < > 3.9   CHLORIDE 98 99 95   < > 98   CO2 26 25 24   < > 27   BUN 51* 41* 71*   < > 55*   CR 3.93* 3.39* 5.59*   < > 4.81*   ANIONGAP 9 9 11   < > 7   CALOS 8.4* 8.8 8.4*   < > 8.6   * 161* 115*   < > 104*   ALBUMIN 2.6* 2.8* 2.6*   < > 2.6*   PROTTOTAL 7.0 7.8 6.9   < > 7.2   BILITOTAL 0.5 0.7 0.5   < > 0.6   ALKPHOS 121 136 121   < > 120   ALT 25 28 26   < > 29   AST 44 47* 44   < > 45    < > = values in this interval not displayed.      No results found for this or any previous visit (from the past 24 hour(s)).  Medications     dextrose       dextrose Stopped (02/17/21 1600)     DOBUTamine 3 mcg/kg/min (03/06/21 0928)     Warfarin Therapy Reminder         allopurinol  100 mg Oral or Feeding Tube Daily     amiodarone  200 mg Oral Daily     aspirin  81 mg Oral Daily     bumetanide  4 mg Oral Daily     cephALEXin  500 mg Oral Q12H BRITTANI     finasteride  5 mg Oral Daily     FLUoxetine  30 mg Oral Daily     hydrALAZINE  125 mg Oral Q8H BRITTANI     insulin aspart  1-7 Units Subcutaneous TID AC     insulin aspart  1-5 Units Subcutaneous At Bedtime     magnesium oxide  400 mg Oral BID     melatonin  3 mg Oral At Bedtime     multivitamin RENAL  1 capsule Oral Daily     omeprazole  20 mg Oral QAM AC     oxymetazoline  2 spray Both Nostrils BID     senna-docusate  2 tablet Oral BID     tamsulosin  0.4 mg Oral Daily     warfarin ANTICOAGULANT  4 mg Oral ONCE at 18:00

## 2021-03-06 NOTE — PROGRESS NOTES
LVAD Social Work Services Progress Note      Date of Initial Social Work Evaluation: 2/16/2021  Collaborated with: No[facility] ([phone]) and Dulce Ramirez ([place] ph: [phone] f: [phone]), LVAD Coordinator     Data:  Pt with hx of LVAD implant 2/18/2020. Pt admitted 2/16/2021 from OSH for cardiogenic and septic shock and multiorgan failure. Pt was extubated 2/20. Pt had tunneled catheter placed on 2/23.   The following outpatient dialysis referrals have been made:   [facility] ([place] [phone]): dialysis center will not be trained and ready to accept pt until early-mid April.   [facility]: Received call from intake today asking for more clinical notes to be sent. Right now indicating that he would need to be stable from a cardiac standpoint for 30 days, but willing to review clinical information as pt was admitted with pneumonia.   [facility]: Writer spoke to nurse manager late in the afternoon. Will send clinicals and have further discussions next week.   [facility] Intake: There was a referral to [place] facility, but writer has not heard back on the status of this. Anticipate that [facility] will globally deny as there are no facilities close to pt's home.     Intervention: Discharge Planning   Assessment: At this time the only dialysis center that is actively expressing interest is [facility]. [facility] able to provide education on home PD and home HD in the future if this is something that is a possibility for pt.   Education provided by SW: Ongoing Social Work support, discharge planning   Plan:    Discharge Plans in Progress: See referrals made above.     Barriers to d/c plan: Outpatient dialysis plan     Follow up Plan: SW to continue to work on outpatient dialysis placement Monday.

## 2021-03-06 NOTE — PLAN OF CARE
BP 98/76 (BP Location: Left arm)   Pulse 78   Temp 98.2  F (36.8  C) (Oral)   Resp 18   Wt 94.7 kg (208 lb 12.4 oz)   SpO2 96%   BMI 32.70 kg/m      D: Patient was transferred from OSH with cardiogenic shock with multiple organ failure.   I/A: Patient is on HM3 and ICD, returned from HD at 1630, he is A&OX4, Denies pain and nausea, on tele with SR in 70s, on room air, LVAD #s WNL, dressing was changed during the day.  BS was 130s at dinner time no coverage needed.  Up with SBA and using personal can.  Dialysis catheter on the R-chest, and R-PICC on R-ACS with purple line infusing dobutamine at 3mcg/kg/min.  Using bedside urinal and voided 400cc tan color urine.  Patient is PCU collect, K=3.6, spoke to Dr. Montes, not appropriate for protocol since on HD, but will continue to monitor.  P: Patient will have OP dialysis in [place], [place] upon discharge.

## 2021-03-06 NOTE — PLAN OF CARE
D: Admitted 2/15 with mixed cardiogenic and distributive shock with intermittent wide complex tachycardia. Had pneumonia now resolved. Persistent renal failure requiring HD now. Hx of NICM (LVEF 15-20%), NYHA III s/p HM 3 on 2/18/2020, s/p ICD, h/o MSSA infection and bacteremia, VT on amiodarone, moderate nonobstructive CAD, severe MR, afib on warfarin, HTN, ABHINAV (CPAP), CKD 4, HLP, HIT, and type 2 diabetes.     I: Monitored vitals and assessed pt status.   Changed:  Running: dobutamine 3 mcg/kg/min   PRN:    A: A0x4. [place]. SR w/ 1st degree block in 80-90s. Room air, uses CPAP @ noc. Up SBA with cane. AC/HS blood glucose checks. Labs drawn this am. LVAD #'s WDL. Has been urinating, see flow sheets for output. Last BM 3/5. Dressings CDI.     P: Continue to monitor Pt status and report changes to Cards 2. Plan to discharge once medically stable and able to find ARU.

## 2021-03-06 NOTE — PLAN OF CARE
Pt admitted 2/16/2021 from OSH for cardiogenic and septic shock and tachyarrhythmia. Pt was extubated 2/20. Pt had tunneled catheter placed on 2/23. Pmhx: ICM with LVEF 15-20%, NYHA III s/p HM III 2/18/2020, s/p ICD placed on 3/16/2020, h/o MSSA DL infx and bacteremia 11/5/2020 on prophylactic cephalexin, h/o VT on amiodarone, moderate nonobstructive CAD, severe MR, atrial fibrillation on warfarin, hypertension, ABHINAV, CKD IV, DMII, HLP,  h/o HIT    Code status: full     Team: cards 2  Changed: PICC lumen cap changed  Running: Dobutamine @  3 mcg/kg/min (new bag)  PRN: Magnesium replacement     Neuro: ao*4, [place], forgetful at times  Cardiac/Tele:  SR w/ first degree block and bundle block, LVAD #'s WNL's  Respiratory: room air  GI/: urinating via urinal, LBM 3/5 per pt   Diet/Appetite: 2g na  Skin: JEFF skin pt with wife, LVAD dressing CDI (wants dressing changed in evening and wanted to spend time with wife), generalized edema   Endocrine: ACHS  LDAs: double lumen PICC, CVC non tunneled CDI (for HD)  Activity: SBA  Pain: rhonda Mason, RN  Time cared for 0700 - 1530

## 2021-03-07 ENCOUNTER — APPOINTMENT (OUTPATIENT)
Dept: PHYSICAL THERAPY | Facility: CLINIC | Age: 66
DRG: 870 | End: 2021-03-07
Attending: INTERNAL MEDICINE
Payer: MEDICARE

## 2021-03-07 LAB
ALBUMIN SERPL-MCNC: 2.7 G/DL (ref 3.4–5)
ALBUMIN SERPL-MCNC: 3 G/DL (ref 3.4–5)
ALBUMIN SERPL-MCNC: NORMAL G/DL (ref 3.4–5)
ALP SERPL-CCNC: 119 U/L (ref 40–150)
ALP SERPL-CCNC: 123 U/L (ref 40–150)
ALP SERPL-CCNC: NORMAL U/L (ref 40–150)
ALT SERPL W P-5'-P-CCNC: 26 U/L (ref 0–70)
ALT SERPL W P-5'-P-CCNC: 46 U/L (ref 0–70)
ALT SERPL W P-5'-P-CCNC: NORMAL U/L (ref 0–70)
ANION GAP SERPL CALCULATED.3IONS-SCNC: 11 MMOL/L (ref 3–14)
ANION GAP SERPL CALCULATED.3IONS-SCNC: 11 MMOL/L (ref 3–14)
ANION GAP SERPL CALCULATED.3IONS-SCNC: NORMAL MMOL/L (ref 6–17)
APTT PPP: 43 SEC (ref 22–37)
AST SERPL W P-5'-P-CCNC: 48 U/L (ref 0–45)
AST SERPL W P-5'-P-CCNC: 48 U/L (ref 0–45)
AST SERPL W P-5'-P-CCNC: NORMAL U/L (ref 0–45)
BASOPHILS # BLD AUTO: 0 10E9/L (ref 0–0.2)
BASOPHILS NFR BLD AUTO: 0.7 %
BILIRUB SERPL-MCNC: 0.5 MG/DL (ref 0.2–1.3)
BILIRUB SERPL-MCNC: 0.5 MG/DL (ref 0.2–1.3)
BILIRUB SERPL-MCNC: NORMAL MG/DL (ref 0.2–1.3)
BUN SERPL-MCNC: 66 MG/DL (ref 7–30)
BUN SERPL-MCNC: 69 MG/DL (ref 7–30)
BUN SERPL-MCNC: NORMAL MG/DL (ref 7–30)
CALCIUM SERPL-MCNC: 8.2 MG/DL (ref 8.5–10.1)
CALCIUM SERPL-MCNC: 8.5 MG/DL (ref 8.5–10.1)
CALCIUM SERPL-MCNC: NORMAL MG/DL (ref 8.5–10.1)
CHLORIDE SERPL-SCNC: 96 MMOL/L (ref 94–109)
CHLORIDE SERPL-SCNC: 97 MMOL/L (ref 94–109)
CHLORIDE SERPL-SCNC: NORMAL MMOL/L (ref 94–109)
CO2 SERPL-SCNC: 24 MMOL/L (ref 20–32)
CO2 SERPL-SCNC: 25 MMOL/L (ref 20–32)
CO2 SERPL-SCNC: NORMAL MMOL/L (ref 20–32)
CREAT SERPL-MCNC: 4.64 MG/DL (ref 0.66–1.25)
CREAT SERPL-MCNC: 4.74 MG/DL (ref 0.66–1.25)
CREAT SERPL-MCNC: NORMAL MG/DL (ref 0.66–1.25)
DIFFERENTIAL METHOD BLD: ABNORMAL
EOSINOPHIL # BLD AUTO: 0.3 10E9/L (ref 0–0.7)
EOSINOPHIL NFR BLD AUTO: 6.2 %
ERYTHROCYTE [DISTWIDTH] IN BLOOD BY AUTOMATED COUNT: 22.5 % (ref 10–15)
GFR SERPL CREATININE-BSD FRML MDRD: 12 ML/MIN/{1.73_M2}
GFR SERPL CREATININE-BSD FRML MDRD: 12 ML/MIN/{1.73_M2}
GFR SERPL CREATININE-BSD FRML MDRD: NORMAL ML/MIN/{1.73_M2}
GLUCOSE BLDC GLUCOMTR-MCNC: 134 MG/DL (ref 70–99)
GLUCOSE BLDC GLUCOMTR-MCNC: 137 MG/DL (ref 70–99)
GLUCOSE BLDC GLUCOMTR-MCNC: 98 MG/DL (ref 70–99)
GLUCOSE SERPL-MCNC: 109 MG/DL (ref 70–99)
GLUCOSE SERPL-MCNC: 150 MG/DL (ref 70–99)
GLUCOSE SERPL-MCNC: NORMAL MG/DL (ref 70–99)
HCT VFR BLD AUTO: 25.9 % (ref 40–53)
HGB BLD-MCNC: 7.9 G/DL (ref 13.3–17.7)
IMM GRANULOCYTES # BLD: 0 10E9/L (ref 0–0.4)
IMM GRANULOCYTES NFR BLD: 0.9 %
INR PPP: 2.51 (ref 0.86–1.14)
LDH SERPL L TO P-CCNC: 288 U/L (ref 85–227)
LYMPHOCYTES # BLD AUTO: 0.9 10E9/L (ref 0.8–5.3)
LYMPHOCYTES NFR BLD AUTO: 19.1 %
MAGNESIUM SERPL-MCNC: 1.4 MG/DL (ref 1.6–2.3)
MCH RBC QN AUTO: 25 PG (ref 26.5–33)
MCHC RBC AUTO-ENTMCNC: 30.5 G/DL (ref 31.5–36.5)
MCV RBC AUTO: 82 FL (ref 78–100)
MONOCYTES # BLD AUTO: 0.7 10E9/L (ref 0–1.3)
MONOCYTES NFR BLD AUTO: 14.9 %
NEUTROPHILS # BLD AUTO: 2.6 10E9/L (ref 1.6–8.3)
NEUTROPHILS NFR BLD AUTO: 58.2 %
NRBC # BLD AUTO: 0 10*3/UL
NRBC BLD AUTO-RTO: 0 /100
PLATELET # BLD AUTO: 208 10E9/L (ref 150–450)
POTASSIUM SERPL-SCNC: 3.4 MMOL/L (ref 3.4–5.3)
POTASSIUM SERPL-SCNC: 3.5 MMOL/L (ref 3.4–5.3)
POTASSIUM SERPL-SCNC: NORMAL MMOL/L (ref 3.4–5.3)
PROT SERPL-MCNC: 7.1 G/DL (ref 6.8–8.8)
PROT SERPL-MCNC: 7.6 G/DL (ref 6.8–8.8)
PROT SERPL-MCNC: NORMAL G/DL (ref 6.8–8.8)
RBC # BLD AUTO: 3.16 10E12/L (ref 4.4–5.9)
SODIUM SERPL-SCNC: 131 MMOL/L (ref 133–144)
SODIUM SERPL-SCNC: 133 MMOL/L (ref 133–144)
SODIUM SERPL-SCNC: NORMAL MMOL/L (ref 133–144)
WBC # BLD AUTO: 4.5 10E9/L (ref 4–11)

## 2021-03-07 PROCEDURE — 250N000013 HC RX MED GY IP 250 OP 250 PS 637

## 2021-03-07 PROCEDURE — 97116 GAIT TRAINING THERAPY: CPT | Mod: GP | Performed by: PHYSICAL THERAPIST

## 2021-03-07 PROCEDURE — 97530 THERAPEUTIC ACTIVITIES: CPT | Mod: GP | Performed by: PHYSICAL THERAPIST

## 2021-03-07 PROCEDURE — 80053 COMPREHEN METABOLIC PANEL: CPT | Performed by: INTERNAL MEDICINE

## 2021-03-07 PROCEDURE — 83735 ASSAY OF MAGNESIUM: CPT | Performed by: INTERNAL MEDICINE

## 2021-03-07 PROCEDURE — 85025 COMPLETE CBC W/AUTO DIFF WBC: CPT | Performed by: INTERNAL MEDICINE

## 2021-03-07 PROCEDURE — 250N000013 HC RX MED GY IP 250 OP 250 PS 637: Performed by: STUDENT IN AN ORGANIZED HEALTH CARE EDUCATION/TRAINING PROGRAM

## 2021-03-07 PROCEDURE — 250N000011 HC RX IP 250 OP 636: Performed by: STUDENT IN AN ORGANIZED HEALTH CARE EDUCATION/TRAINING PROGRAM

## 2021-03-07 PROCEDURE — 999N001017 HC STATISTIC GLUCOSE BY METER IP

## 2021-03-07 PROCEDURE — 85730 THROMBOPLASTIN TIME PARTIAL: CPT | Performed by: INTERNAL MEDICINE

## 2021-03-07 PROCEDURE — 83615 LACTATE (LD) (LDH) ENZYME: CPT | Performed by: INTERNAL MEDICINE

## 2021-03-07 PROCEDURE — 250N000013 HC RX MED GY IP 250 OP 250 PS 637: Performed by: INTERNAL MEDICINE

## 2021-03-07 PROCEDURE — 85610 PROTHROMBIN TIME: CPT | Performed by: INTERNAL MEDICINE

## 2021-03-07 PROCEDURE — 99232 SBSQ HOSP IP/OBS MODERATE 35: CPT | Mod: GC | Performed by: INTERNAL MEDICINE

## 2021-03-07 PROCEDURE — 214N000001 HC R&B CCU UMMC

## 2021-03-07 RX ORDER — MAGNESIUM SULFATE HEPTAHYDRATE 40 MG/ML
4 INJECTION, SOLUTION INTRAVENOUS ONCE
Status: DISCONTINUED | OUTPATIENT
Start: 2021-03-07 | End: 2021-03-07

## 2021-03-07 RX ORDER — POTASSIUM CHLORIDE 750 MG/1
20 TABLET, EXTENDED RELEASE ORAL ONCE
Status: COMPLETED | OUTPATIENT
Start: 2021-03-07 | End: 2021-03-07

## 2021-03-07 RX ORDER — MAGNESIUM SULFATE HEPTAHYDRATE 40 MG/ML
2 INJECTION, SOLUTION INTRAVENOUS ONCE
Status: COMPLETED | OUTPATIENT
Start: 2021-03-07 | End: 2021-03-07

## 2021-03-07 RX ORDER — WARFARIN SODIUM 4 MG/1
4 TABLET ORAL
Status: COMPLETED | OUTPATIENT
Start: 2021-03-07 | End: 2021-03-07

## 2021-03-07 RX ORDER — MAGNESIUM OXIDE 400 MG/1
400 TABLET ORAL 2 TIMES DAILY
Status: COMPLETED | OUTPATIENT
Start: 2021-03-07 | End: 2021-03-08

## 2021-03-07 RX ADMIN — HYDRALAZINE HYDROCHLORIDE 125 MG: 100 TABLET, FILM COATED ORAL at 14:36

## 2021-03-07 RX ADMIN — POTASSIUM CHLORIDE 20 MEQ: 750 TABLET, EXTENDED RELEASE ORAL at 08:50

## 2021-03-07 RX ADMIN — OXYMETAZOLINE HYDROCHLORIDE 2 SPRAY: 0.05 SPRAY NASAL at 20:27

## 2021-03-07 RX ADMIN — CEPHALEXIN 500 MG: 500 CAPSULE ORAL at 08:16

## 2021-03-07 RX ADMIN — WARFARIN SODIUM 4 MG: 4 TABLET ORAL at 17:08

## 2021-03-07 RX ADMIN — OXYMETAZOLINE HYDROCHLORIDE 2 SPRAY: 0.05 SPRAY NASAL at 08:16

## 2021-03-07 RX ADMIN — FINASTERIDE 5 MG: 5 TABLET, FILM COATED ORAL at 08:15

## 2021-03-07 RX ADMIN — HYDRALAZINE HYDROCHLORIDE 125 MG: 100 TABLET, FILM COATED ORAL at 05:02

## 2021-03-07 RX ADMIN — Medication 1 CAPSULE: at 08:14

## 2021-03-07 RX ADMIN — OMEPRAZOLE 20 MG: 20 CAPSULE, DELAYED RELEASE ORAL at 08:15

## 2021-03-07 RX ADMIN — BUMETANIDE 4 MG: 2 TABLET ORAL at 08:15

## 2021-03-07 RX ADMIN — MELATONIN TAB 3 MG 3 MG: 3 TAB at 20:31

## 2021-03-07 RX ADMIN — CEPHALEXIN 500 MG: 500 CAPSULE ORAL at 20:25

## 2021-03-07 RX ADMIN — TAMSULOSIN HYDROCHLORIDE 0.4 MG: 0.4 CAPSULE ORAL at 08:14

## 2021-03-07 RX ADMIN — Medication 400 MG: at 08:15

## 2021-03-07 RX ADMIN — DOCUSATE SODIUM 50 MG AND SENNOSIDES 8.6 MG 2 TABLET: 8.6; 5 TABLET, FILM COATED ORAL at 20:25

## 2021-03-07 RX ADMIN — DOCUSATE SODIUM 50 MG AND SENNOSIDES 8.6 MG 2 TABLET: 8.6; 5 TABLET, FILM COATED ORAL at 08:14

## 2021-03-07 RX ADMIN — MAGNESIUM SULFATE HEPTAHYDRATE 2 G: 40 INJECTION, SOLUTION INTRAVENOUS at 09:20

## 2021-03-07 RX ADMIN — DOBUTAMINE HYDROCHLORIDE 3 MCG/KG/MIN: 200 INJECTION INTRAVENOUS at 11:54

## 2021-03-07 RX ADMIN — AMIODARONE HYDROCHLORIDE 200 MG: 200 TABLET ORAL at 08:15

## 2021-03-07 RX ADMIN — Medication 400 MG: at 20:26

## 2021-03-07 RX ADMIN — HYDRALAZINE HYDROCHLORIDE 125 MG: 100 TABLET, FILM COATED ORAL at 20:25

## 2021-03-07 RX ADMIN — DOCUSATE SODIUM 50 MG AND SENNOSIDES 8.6 MG 1 TABLET: 8.6; 5 TABLET, FILM COATED ORAL at 14:55

## 2021-03-07 RX ADMIN — ASPIRIN 81 MG CHEWABLE TABLET 81 MG: 81 TABLET CHEWABLE at 08:14

## 2021-03-07 RX ADMIN — ALLOPURINOL 100 MG: 100 TABLET ORAL at 08:14

## 2021-03-07 RX ADMIN — FLUOXETINE 30 MG: 20 CAPSULE ORAL at 08:15

## 2021-03-07 RX ADMIN — ZOLPIDEM TARTRATE 5 MG: 5 TABLET, FILM COATED ORAL at 20:26

## 2021-03-07 RX ADMIN — Medication 400 MG: at 08:50

## 2021-03-07 ASSESSMENT — ACTIVITIES OF DAILY LIVING (ADL)
ADLS_ACUITY_SCORE: 14
ADLS_ACUITY_SCORE: 15

## 2021-03-07 NOTE — PLAN OF CARE
Shift Summary [id number]    D:  Pt admitted from OSH with cardiogenic shock and multi organ failure. Pt's PMH consists of heart mate 3 in 2020, CKD now requiring hemodialysis, DM 2, Afib, ABHINAV, HTN and HIT.  A/I:  Pt transferred from ICU a few days ago. Pt has been in SR with !st degree rates in the 70's. Heart mate 3 numbers WNL, dressing changed, site CDI no signs of infection noted. Pt's lungs diminished in bases, sating well on RA. Pt eating,drinking and voiding well.FSBG in the 170's. Pt noted to have LE edema, compression socks applied. Pt denies any c/o pain.  Pt alert and oriented but very [place]. Pt gets up independently without issues. Pt does have DL PICC with dobutamine gtt at 3 mq/kg/min.   working on setting up OP dialysis up as pt has HM which complicates finding dialysis that will accept and/or has competency in HM. Pt currently receiving diuresis and close monitoring.  P:  Plan on discharging home when all required out patient services are set up and in place. Continue with current POC and medications,monitoring and therapies.

## 2021-03-07 NOTE — PLAN OF CARE
D: Admitted 2/15 with mixed cardiogenic and distributive shock with intermittent wide complex tachycardia. Had pneumonia now resolved. Persistent renal failure requiring HD now. Hx of NICM (LVEF 15-20%), NYHA III s/p HM 3 on 2/18/2020, s/p ICD, h/o MSSA infection and bacteremia, VT on amiodarone, moderate nonobstructive CAD, severe MR, afib on warfarin, HTN, ABHINAV (CPAP), CKD 4, HLP, HIT, and type 2 diabetes.     I: Monitored vitals and assessed pt status.   Changed:  Running: Dobutamine 3 mcg/min/kg (9 mL/hr)   PRN:    A: A0x4, [place] and forgetful. SR w/ 1st AVB in 70s-80s. Afebrile. Room air, CPAP @ noc. Fair UP (on HD). Up SBA with cane. Denies pain. Good appetite, last BM 3/6. AC/HS blood glucose checks. LVAD #'s WDL, no alarms. Has compression stockings on. +1/+2 edema. Generalized bruising.     P: Continue to monitor Pt status and report changes to Cards 2. Plan to discharge once able to find ARU closer to home.

## 2021-03-07 NOTE — PLAN OF CARE
Pt admitted 2/16/2021 from OSH for cardiogenic and septic shock and tachyarrhythmia. Pt was extubated 2/20. Pt had tunneled catheter placed on 2/23. Pmhx: ICM with LVEF 15-20%, NYHA III s/p HM III 2/18/2020, s/p ICD placed on 3/16/2020, h/o MSSA DL infx and bacteremia 11/5/2020 on prophylactic cephalexin, h/o VT on amiodarone, moderate nonobstructive CAD, severe MR, atrial fibrillation on warfarin, hypertension, ABHINAV, CKD IV, DMII, HLP,  h/o HIT    Code status: full     Team: cards 2  Running: Dobutamine @  3 mcg/kg/min (new bag)  PRN: Magnesium replacement (2g IV + 400 mg)            Senna x 1    Neuro: ao*4, [place]  Cardiac/Tele:  SR w/  bundle block, LVAD #'s WNL's  Respiratory: room air  GI/: urinating via urinal, LBM 3/5 per pt   Diet/Appetite: 2g na  Skin: LVAD dressing CDI and changed, generalized edema, compression socks on   Endocrine: ACHS  LDAs: double lumen PICC, CVC non tunneled CDI (for HD)  Activity: SBA  Pain: denies    Plan: HD is planned on 3/8/21    Marjan Mason, RN  Time cared for 0700 - 1530

## 2021-03-07 NOTE — PROGRESS NOTES
[facility]    Cardiology Progress Note- Cards 2    Date of Admission:  2/15/2021     Today's Plan  - Dialysis as per nephrology, next HD is planned on 3/8/21  - Bumetanide prn on non-dialysis days  - Awaiting Social work arranging dialysis and placement    Assessment & Plan: S   Eliseo Tanner is a 65 year old male with PMHx relevant for NICM with LVEF 15-20%, NYHA III s/p HM III 2/18/2020 (post op complicated by retrosternal hematoma with bleeding in the lungs), s/p ICD placed on 3/16/2020, h/o MSSA driveline infection and bacteremia 11/5/2020 on prophylactic cephalexin, h/o VT on amiodarone, moderate nonobstructive CAD, severe MR, atrial fibrillation on warfarin, hypertension, ABHINAV requiring CPAP, CKD IV, DMII, HLP,  h/o HIT who presented to the Cardiovascular Unit (4E Cardiac ICU) with mixed cardiogenic and distributive shock withan intermittent wide complex tachycardia. Initially requiring vasopressors and inotropes, intubated for hypoxemia. Treated for legionella pneumonia. Stabilized on low dose dobutamine off of pressors. Extubated. With persistent oliguric renal failure.       Cardiovascular:     #Mixed Cardiogenic and Septic Shock - Improved  #Multiorgan Failure - Improved  Presented from [facility] with subacute development of shortness of breath, dyspnea on exertion, dizziness/lightheadedness with near syncopal event found to be febrile with intermittent wide complex tachycardia. Recent COVID19 moderna vaccination. CT chest showing bilateral infiltrates. Community acquired pneumonia vs. Possible recurrent of MSSA bacteremia WBC 24.5, Procal 7.95, , Lactacte 6.9. Completed course of IV antibiotics.     Mildly elevated filling pressures on presentation CVP 18, PA 35/20, PCWP 11. Likely some component of cardiogenic shock. LVAD parameters relatively stable despite markedly elevated LDH 8,531. Euvolemic on exam. Worsening  renal function, Cr 4.62, up-trending LFT's/ INR. Legionella antigen positive. Weaned off of vasopressor and maintained on dobutamine. Acute hepatic injury which is improving. Improved renal function but continues to Require dialysis. Shock resolved.      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 Bhatti (ID clinic)   # Troponin elevation (likely demand ischemia)  # Essential Hypertension  # Hyperlipidemia        Patient with long standing history of NICM previously implanted LVAD (HM III) on 02/18/20 due to worsening functional status and systolic ventricular dysfunction (further complicated by RV dysfunction by VAD hemodynamic compensatory mechanism, VT in ICU now on Amiodarone and Atrial Fibrillation with AVR requiring DCCV on 02/28/20).      Elevated cardiac biomarkers on presentation, SGC with only mildly elevated filling pressures. Euvolemic on exam. Troponin elevation likely related to demand ischemia with no concerns for ACS. Most recent VAD interrogation without any significant Flow-Alarms     LDH 8,531. Initial concern for LVAD thrombus. However given relatively stable LVAD parameters, degree of LDH elevation is out of proportion. Will continue to monitor for LVAD alarms.      - continue dobutamine for renal recovery- Continue low dose dobutamine @3  - Held ACE-I/ARB/ARNi: Lisinopril; due to acute renal failure  - No BB; deferred 2/2 shock  - Aldosterone antagonist: deferred while other medical therapy is optimized  - SCD prophylaxis: ICD  - Fluid status : fairly euvolemic, Bumex on non-dialysis days, further management per  dialysis  - MAP Goal: 65-90  - On Warfarin for HM-III INR Goal 2-3  - Continue Hydralazine  - Continue ASA 81 mg per oral daily       # Wide complex tachycardia. Atrial Flutter vs Afib vs Ventricular Tachycardia  # History of Atrial Fibrillation s/p DCCV/history of Atrial Flutter       Wide complex tachycardia HR 140s on presentation in the setting of febrile illness and likely pneumonia. Did not initially respond to adenosine. Concern for VT. Intermittently back into HR 60s with underlying rhythm of atrial flutter 3:1 conduction block. Per EP can not rule out VT, may be dual tachycardias. Has been in persistent atrial flutter. S/p CHAMP+DCCV, sucessfully maintaining NSR.       - continue home amiodarone  - Ensure K+ >4.0 mEq/L and Mg+2 >2.0  - continue warfarin  - On cardiac telemetry     Pulmonary  #Legionella Pneumonia - resolved  Hypoxemic in the setting of mixed cardiogenic septic shock requiring emergent intubation. O2 requirements quickly improved. S/p extubation on room air.     Infectious disease     #Sepsis  #Legionella Pneumonia  #Bilateral pulmonary infiltrates  CT showing bilateral diffuse patchy consolidations concerning for infectious process. Low suspicion for recurrent driveline or possible hardware infection (history of MSSA Chronic Driveline Infection). Urine without pyuria. Urine legionella ag positive.   - f/u blood cultures  - continue Abx as per ID              -Cefazolin (2/18/21 - 3/1/21) followed by PO cefalexin              Azithromycin (2/15/21 - 2/25/21)     Renal:  # Acute on Chronic CKD stage IV likely 2/2 Septic Shock   s/p L TDC receiving IHD  - IHD per nephrology  - Daily Weight's  - Strict I/O's  - Daily BMP's  - Avoid any additional nephrotoxicity   - intermittent bumetanide on non-dialysis days     GI  # Shock Liver   # Congestive Hepatopathy - resolved  Hepatoccelular pattern of liver injury  > 3,496, AST 1,441 > 8,490 likely 2/2 to septic shock. INR downtrended s/p 3mg IV  vit K. RUQ US without portal vein thrombus. LFTs downtrended nicely. No evidence of synthetic dysfunction.      Hematological   # Chronic Microcytic/Hypochromic Anemia (likely related to iron deficiency)  # Coagulopathy related to sepsis   # IgG Kappa monoclonal Gammopathy of undetermined significance  - Monitor Hgb (baseline 8-9g/dL)  - transfuse for Hb < 7.0   - Patient follows with [facility] and [facility] (Dr. Ibarra)     # Previous Concern For HIT  On previous hospitalization for LVAD placement (02/06/20-03/20/20), concern for HIT. Platelets 250K --> ~ 90K wuth documented history of exposure to unfractionate heparin products at OSH prior to his installation at the [facility].     At that time, patient underwent two HIT panel tests (first one was negative and second antibody test was positive). No known thrombosis events. DAVINA antibody was negative raising question about validity of diagnosis . Per hematology at the time (Dr. James), no other cause for isolated thrombocytopenia. Immediate recovery of plts following d/c of heparin. 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      - avoiding heparin products  - continue warfarin     Endocrine  # Type II DM     > Last Hgb A1C: 6.8 (01/06/21)     - Continue PTA Insulin Basaglar 10 Units Aq at bedtime  - On Medium sliding scale insulin  - Oral Hypoglycemia Protocol      Neurologic:  # Disorientation/Toxic Metabolic Encephalopathy - Resolved  # Chronic Intracranial Small Vessel Disease        Patient reported some lightheadedness/dizziness and disorientation the days prior to hospitalization while at home. However, no reports of LOC, seizure activity, focal deficits, or findings for acute intracranial pathology on most recent OSH CT head w/o contrast (02/15/21). Mental status improved, s/p extubation        DVT Prophylaxis: therapeutic warfarin  Guevara Catheter: not present  Code Status: Full Code  Fluids: no IVFs  Lines: MICHELINE TD (2/23)    Disposition Plan    Pending establishment of long-term dialysis needs      Entered: Pineda Damian MD 03/07/2021, 8:28 AM       The patient's care was discussed with Dr. Salmeron.    Pineda Damian MD   [facility] [id number]  [facility]  Please see sign in/sign out for up to date coverage information  ______________________________________________________________________    Interval History   No complaints. No shortness of breath, palpitations, fevers or chills.     Data reviewed today: I reviewed all medications, new labs and imaging results over the last 24 hours.    Physical Exam   Vital Signs: Temp: 98.1  F (36.7  C) Temp src: Oral BP: 113/85 Pulse: 60   Resp: 18 SpO2: 97 % O2 Device: None (Room air)    Weight: 207 lbs 3.2 oz   Gen: awake and in NAD  HEENT: gauze in Left nares, RIJ TDC in place. JVP 10 cm  Heart: LVAD hum.   Lungs: Bilateral crackles. No wheezes  GI: Soft, nontender, nondistended.   Extremities: WWP, Trace LE edema  Neuro: grossly non focal  Skin: no rashes.    Data   Recent Labs   Lab 03/07/21  0543 03/06/21  0430 03/05/21  1931 03/05/21  0400   WBC 4.5 4.4  --  5.4   HGB 7.9* 8.0*  --  7.7*   MCV 82 82  --  81    196  --  210   INR 2.51* 2.43*  --  2.40*    133 133 129*   POTASSIUM 3.5 3.8 3.6 3.8   CHLORIDE 97 98 99 95   CO2 25 26 25 24   BUN 66* 51* 41* 71*   CR 4.74* 3.93* 3.39* 5.59*   ANIONGAP 11 9 9 11   CALOS 8.2* 8.4* 8.8 8.4*   * 109* 161* 115*   ALBUMIN 2.7* 2.6* 2.8* 2.6*   PROTTOTAL 7.1 7.0 7.8 6.9   BILITOTAL 0.5 0.5 0.7 0.5   ALKPHOS 119 121 136 121   ALT 26 25 28 26   AST 48* 44 47* 44     No results found for this or any previous visit (from the past 24 hour(s)).  Medications     dextrose       dextrose Stopped (02/17/21 1600)     DOBUTamine 3 mcg/kg/min (03/07/21 0552)     Warfarin Therapy Reminder         allopurinol  100 mg Oral or Feeding Tube Daily     amiodarone  200 mg Oral Daily     aspirin  81 mg Oral Daily      bumetanide  4 mg Oral Daily     cephALEXin  500 mg Oral Q12H BRITTANI     finasteride  5 mg Oral Daily     FLUoxetine  30 mg Oral Daily     hydrALAZINE  125 mg Oral Q8H BRITTANI     insulin aspart  1-7 Units Subcutaneous TID AC     insulin aspart  1-5 Units Subcutaneous At Bedtime     magnesium oxide  400 mg Oral BID     magnesium oxide  400 mg Oral BID     melatonin  3 mg Oral At Bedtime     multivitamin RENAL  1 capsule Oral Daily     omeprazole  20 mg Oral QAM AC     oxymetazoline  2 spray Both Nostrils BID     senna-docusate  2 tablet Oral BID     tamsulosin  0.4 mg Oral Daily

## 2021-03-08 ENCOUNTER — APPOINTMENT (OUTPATIENT)
Dept: PHYSICAL THERAPY | Facility: CLINIC | Age: 66
DRG: 870 | End: 2021-03-08
Attending: INTERNAL MEDICINE
Payer: MEDICARE

## 2021-03-08 LAB
ALBUMIN SERPL-MCNC: 2.7 G/DL (ref 3.4–5)
ALBUMIN SERPL-MCNC: 3 G/DL (ref 3.4–5)
ALP SERPL-CCNC: 121 U/L (ref 40–150)
ALP SERPL-CCNC: 133 U/L (ref 40–150)
ALT SERPL W P-5'-P-CCNC: 26 U/L (ref 0–70)
ALT SERPL W P-5'-P-CCNC: 29 U/L (ref 0–70)
ANION GAP SERPL CALCULATED.3IONS-SCNC: 8 MMOL/L (ref 3–14)
ANION GAP SERPL CALCULATED.3IONS-SCNC: 9 MMOL/L (ref 3–14)
APTT PPP: 41 SEC (ref 22–37)
AST SERPL W P-5'-P-CCNC: 49 U/L (ref 0–45)
AST SERPL W P-5'-P-CCNC: 49 U/L (ref 0–45)
BILIRUB SERPL-MCNC: 0.5 MG/DL (ref 0.2–1.3)
BILIRUB SERPL-MCNC: 0.7 MG/DL (ref 0.2–1.3)
BUN SERPL-MCNC: 34 MG/DL (ref 7–30)
BUN SERPL-MCNC: 72 MG/DL (ref 7–30)
CALCIUM SERPL-MCNC: 8.2 MG/DL (ref 8.5–10.1)
CALCIUM SERPL-MCNC: 9.2 MG/DL (ref 8.5–10.1)
CHLORIDE SERPL-SCNC: 101 MMOL/L (ref 94–109)
CHLORIDE SERPL-SCNC: 96 MMOL/L (ref 94–109)
CO2 SERPL-SCNC: 25 MMOL/L (ref 20–32)
CO2 SERPL-SCNC: 27 MMOL/L (ref 20–32)
CREAT SERPL-MCNC: 2.61 MG/DL (ref 0.66–1.25)
CREAT SERPL-MCNC: 4.75 MG/DL (ref 0.66–1.25)
GFR SERPL CREATININE-BSD FRML MDRD: 12 ML/MIN/{1.73_M2}
GFR SERPL CREATININE-BSD FRML MDRD: 24 ML/MIN/{1.73_M2}
GLUCOSE BLDC GLUCOMTR-MCNC: 110 MG/DL (ref 70–99)
GLUCOSE BLDC GLUCOMTR-MCNC: 142 MG/DL (ref 70–99)
GLUCOSE BLDC GLUCOMTR-MCNC: 156 MG/DL (ref 70–99)
GLUCOSE BLDC GLUCOMTR-MCNC: 98 MG/DL (ref 70–99)
GLUCOSE SERPL-MCNC: 105 MG/DL (ref 70–99)
GLUCOSE SERPL-MCNC: 142 MG/DL (ref 70–99)
INR PPP: 2.67 (ref 0.86–1.14)
LDH SERPL L TO P-CCNC: 297 U/L (ref 85–227)
MAGNESIUM SERPL-MCNC: 1.9 MG/DL (ref 1.6–2.3)
POTASSIUM SERPL-SCNC: 3.3 MMOL/L (ref 3.4–5.3)
POTASSIUM SERPL-SCNC: 3.9 MMOL/L (ref 3.4–5.3)
PROT SERPL-MCNC: 7.2 G/DL (ref 6.8–8.8)
PROT SERPL-MCNC: 7.8 G/DL (ref 6.8–8.8)
SODIUM SERPL-SCNC: 132 MMOL/L (ref 133–144)
SODIUM SERPL-SCNC: 134 MMOL/L (ref 133–144)

## 2021-03-08 PROCEDURE — 258N000003 HC RX IP 258 OP 636: Performed by: INTERNAL MEDICINE

## 2021-03-08 PROCEDURE — 999N001017 HC STATISTIC GLUCOSE BY METER IP

## 2021-03-08 PROCEDURE — 99232 SBSQ HOSP IP/OBS MODERATE 35: CPT | Mod: GC | Performed by: INTERNAL MEDICINE

## 2021-03-08 PROCEDURE — 250N000011 HC RX IP 250 OP 636: Performed by: STUDENT IN AN ORGANIZED HEALTH CARE EDUCATION/TRAINING PROGRAM

## 2021-03-08 PROCEDURE — 90937 HEMODIALYSIS REPEATED EVAL: CPT

## 2021-03-08 PROCEDURE — 85610 PROTHROMBIN TIME: CPT | Performed by: INTERNAL MEDICINE

## 2021-03-08 PROCEDURE — 214N000001 HC R&B CCU UMMC

## 2021-03-08 PROCEDURE — 83735 ASSAY OF MAGNESIUM: CPT | Performed by: INTERNAL MEDICINE

## 2021-03-08 PROCEDURE — 85730 THROMBOPLASTIN TIME PARTIAL: CPT | Performed by: INTERNAL MEDICINE

## 2021-03-08 PROCEDURE — 80053 COMPREHEN METABOLIC PANEL: CPT | Performed by: INTERNAL MEDICINE

## 2021-03-08 PROCEDURE — 250N000013 HC RX MED GY IP 250 OP 250 PS 637: Performed by: INTERNAL MEDICINE

## 2021-03-08 PROCEDURE — 250N000013 HC RX MED GY IP 250 OP 250 PS 637: Performed by: STUDENT IN AN ORGANIZED HEALTH CARE EDUCATION/TRAINING PROGRAM

## 2021-03-08 PROCEDURE — 83615 LACTATE (LD) (LDH) ENZYME: CPT | Performed by: INTERNAL MEDICINE

## 2021-03-08 PROCEDURE — 97116 GAIT TRAINING THERAPY: CPT | Mod: GP

## 2021-03-08 PROCEDURE — 97750 PHYSICAL PERFORMANCE TEST: CPT | Mod: GP

## 2021-03-08 RX ORDER — WARFARIN SODIUM 4 MG/1
4 TABLET ORAL
Status: COMPLETED | OUTPATIENT
Start: 2021-03-08 | End: 2021-03-08

## 2021-03-08 RX ADMIN — FLUOXETINE 30 MG: 20 CAPSULE ORAL at 12:22

## 2021-03-08 RX ADMIN — SODIUM CHLORIDE 300 ML: 9 INJECTION, SOLUTION INTRAVENOUS at 07:59

## 2021-03-08 RX ADMIN — CEPHALEXIN 500 MG: 500 CAPSULE ORAL at 21:13

## 2021-03-08 RX ADMIN — BUMETANIDE 4 MG: 2 TABLET ORAL at 12:24

## 2021-03-08 RX ADMIN — CEPHALEXIN 500 MG: 500 CAPSULE ORAL at 12:23

## 2021-03-08 RX ADMIN — Medication: at 07:59

## 2021-03-08 RX ADMIN — ASPIRIN 81 MG CHEWABLE TABLET 81 MG: 81 TABLET CHEWABLE at 12:25

## 2021-03-08 RX ADMIN — ZOLPIDEM TARTRATE 5 MG: 5 TABLET, FILM COATED ORAL at 21:13

## 2021-03-08 RX ADMIN — ALLOPURINOL 100 MG: 100 TABLET ORAL at 12:22

## 2021-03-08 RX ADMIN — DOCUSATE SODIUM 50 MG AND SENNOSIDES 8.6 MG 1 TABLET: 8.6; 5 TABLET, FILM COATED ORAL at 12:21

## 2021-03-08 RX ADMIN — Medication 400 MG: at 21:13

## 2021-03-08 RX ADMIN — Medication 1 CAPSULE: at 12:22

## 2021-03-08 RX ADMIN — Medication 400 MG: at 12:22

## 2021-03-08 RX ADMIN — DOBUTAMINE HYDROCHLORIDE 3 MCG/KG/MIN: 200 INJECTION INTRAVENOUS at 15:53

## 2021-03-08 RX ADMIN — FINASTERIDE 5 MG: 5 TABLET, FILM COATED ORAL at 12:23

## 2021-03-08 RX ADMIN — HYDRALAZINE HYDROCHLORIDE 125 MG: 100 TABLET, FILM COATED ORAL at 05:00

## 2021-03-08 RX ADMIN — TAMSULOSIN HYDROCHLORIDE 0.4 MG: 0.4 CAPSULE ORAL at 12:24

## 2021-03-08 RX ADMIN — SODIUM CHLORIDE 250 ML: 9 INJECTION, SOLUTION INTRAVENOUS at 07:59

## 2021-03-08 RX ADMIN — WARFARIN SODIUM 4 MG: 4 TABLET ORAL at 18:27

## 2021-03-08 RX ADMIN — AMIODARONE HYDROCHLORIDE 200 MG: 200 TABLET ORAL at 12:23

## 2021-03-08 RX ADMIN — HYDRALAZINE HYDROCHLORIDE 125 MG: 100 TABLET, FILM COATED ORAL at 21:13

## 2021-03-08 RX ADMIN — OMEPRAZOLE 20 MG: 20 CAPSULE, DELAYED RELEASE ORAL at 12:24

## 2021-03-08 RX ADMIN — HYDRALAZINE HYDROCHLORIDE 125 MG: 100 TABLET, FILM COATED ORAL at 14:44

## 2021-03-08 ASSESSMENT — ACTIVITIES OF DAILY LIVING (ADL)
ADLS_ACUITY_SCORE: 14

## 2021-03-08 NOTE — PROGRESS NOTES
[facility]    Cardiology Progress Note- Cards 2    Date of Admission:  2/15/2021     Today's Plan  - Dialysis today (3/8/21) as per nephrology  - Bumetanide prn on non-dialysis days  - Awaiting Social work arranging dialysis and placement  - Continue Dobutamine gtt, will likely discontinue prior to discharge    Assessment & Plan: S   Eliseo Tanner is a 65 year old male with PMHx relevant for NICM with LVEF 15-20%, NYHA III s/p HM III 2/18/2020 (post op complicated by retrosternal hematoma with bleeding in the lungs), s/p ICD placed on 3/16/2020, h/o MSSA driveline infection and bacteremia 11/5/2020 on prophylactic cephalexin, h/o VT on amiodarone, moderate nonobstructive CAD, severe MR, atrial fibrillation on warfarin, hypertension, ABHINAV requiring CPAP, CKD IV, DMII, HLP,  h/o HIT who presented to the Cardiovascular Unit (4E Cardiac ICU) with mixed cardiogenic and distributive shock withan intermittent wide complex tachycardia. Initially requiring vasopressors and inotropes, intubated for hypoxemia. Treated for legionella pneumonia. Stabilized on low dose dobutamine off of pressors. Extubated. With persistent oliguric renal failure.       Cardiovascular:     #Mixed Cardiogenic and Septic Shock - Improved  #Multiorgan Failure - Improved  Presented from [facility] with subacute development of shortness of breath, dyspnea on exertion, dizziness/lightheadedness with near syncopal event found to be febrile with intermittent wide complex tachycardia. Recent COVID19 moderna vaccination. CT chest showing bilateral infiltrates. Community acquired pneumonia vs. Possible recurrent of MSSA bacteremia WBC 24.5, Procal 7.95, , Lactacte 6.9. Completed course of IV antibiotics.     Mildly elevated filling pressures on presentation CVP 18, PA 35/20, PCWP 11. Likely some component of cardiogenic shock. LVAD parameters relatively stable despite  markedly elevated LDH 8,531. Euvolemic on exam. Worsening renal function, Cr 4.62, up-trending LFT's/ INR. Legionella antigen positive. Weaned off of vasopressor and maintained on dobutamine. Acute hepatic injury which is improving. Improved renal function but continues to Require dialysis. Shock resolved.      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 Bhatti (ID clinic)   # Troponin elevation (likely demand ischemia)  # Essential Hypertension  # Hyperlipidemia        Patient with long standing history of NICM previously implanted LVAD (HM III) on 02/18/20 due to worsening functional status and systolic ventricular dysfunction (further complicated by RV dysfunction by VAD hemodynamic compensatory mechanism, VT in ICU now on Amiodarone and Atrial Fibrillation with AVR requiring DCCV on 02/28/20).      Elevated cardiac biomarkers on presentation, SGC with only mildly elevated filling pressures. Euvolemic on exam. Troponin elevation likely related to demand ischemia with no concerns for ACS. Most recent VAD interrogation without any significant Flow-Alarms     LDH 8,531. Initial concern for LVAD thrombus. However given relatively stable LVAD parameters, degree of LDH elevation is out of proportion. Will continue to monitor for LVAD alarms.      - continue dobutamine for renal recovery- Continue low dose dobutamine @3  - Held ACE-I/ARB/ARNi: Lisinopril; due to acute renal failure  - No BB; deferred 2/2 shock  - Aldosterone antagonist: deferred while other medical therapy is optimized  - SCD prophylaxis: ICD  - Fluid status : fairly euvolemic,  Bumex on non-dialysis days, further management per dialysis  - MAP Goal: 65-90  - On Warfarin for HM-III INR Goal 2-3  - Continue Hydralazine  - Continue ASA 81 mg per oral daily       # Wide complex tachycardia. Atrial Flutter vs Afib vs Ventricular Tachycardia  # History of Atrial Fibrillation s/p DCCV/history of Atrial Flutter       Wide complex tachycardia HR 140s on presentation in the setting of febrile illness and likely pneumonia. Did not initially respond to adenosine. Concern for VT. Intermittently back into HR 60s with underlying rhythm of atrial flutter 3:1 conduction block. Per EP can not rule out VT, may be dual tachycardias. Has been in persistent atrial flutter. S/p CHAMP+DCCV, sucessfully maintaining NSR.       - continue home amiodarone  - Ensure K+ >4.0 mEq/L and Mg+2 >2.0  - continue warfarin  - On cardiac telemetry     Pulmonary  #Legionella Pneumonia - resolved  Hypoxemic in the setting of mixed cardiogenic septic shock requiring emergent intubation. O2 requirements quickly improved. S/p extubation on room air.     Infectious disease     #Sepsis  #Legionella Pneumonia  #Bilateral pulmonary infiltrates  CT showing bilateral diffuse patchy consolidations concerning for infectious process. Low suspicion for recurrent driveline or possible hardware infection (history of MSSA Chronic Driveline Infection). Urine without pyuria. Urine legionella ag positive.   - f/u blood cultures  - continue Abx as per ID              -Cefazolin (2/18/21 - 3/1/21) followed by PO cefalexin              Azithromycin (2/15/21 - 2/25/21)     Renal:  # Acute on Chronic CKD stage IV likely 2/2 Septic Shock   s/p L TDC receiving IHD  - IHD per nephrology  - Daily Weight's  - Strict I/O's  - Daily BMP's  - Avoid any additional nephrotoxicity   - intermittent bumetanide on non-dialysis days     GI  # Shock Liver   # Congestive Hepatopathy - resolved  Hepatoccelular pattern of liver injury  > 3,496, AST 1,441 > 8,490  likely 2/2 to septic shock. INR downtrended s/p 3mg IV vit K. RUQ US without portal vein thrombus. LFTs downtrended nicely. No evidence of synthetic dysfunction.      Hematological   # Chronic Microcytic/Hypochromic Anemia (likely related to iron deficiency)  # Coagulopathy related to sepsis   # IgG Kappa monoclonal Gammopathy of undetermined significance  - Monitor Hgb (baseline 8-9g/dL)  - transfuse for Hb < 7.0   - Patient follows with [facility] and [facility] (Dr. Ibarra)     # Previous Concern For HIT  On previous hospitalization for LVAD placement (02/06/20-03/20/20), concern for HIT. Platelets 250K --> ~ 90K wuth documented history of exposure to unfractionate heparin products at OSH prior to his installation at the [facility].     At that time, patient underwent two HIT panel tests (first one was negative and second antibody test was positive). No known thrombosis events. DAVINA antibody was negative raising question about validity of diagnosis . Per hematology at the time (Dr. James), no other cause for isolated thrombocytopenia. Immediate recovery of plts following d/c of heparin. 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      - avoiding heparin products  - continue warfarin     Endocrine  # Type II DM     > Last Hgb A1C: 6.8 (01/06/21)     - Continue PTA Insulin Basaglar 10 Units Aq at bedtime  - On Medium sliding scale insulin  - Oral Hypoglycemia Protocol      Neurologic:  # Disorientation/Toxic Metabolic Encephalopathy - Resolved  # Chronic Intracranial Small Vessel Disease        Patient reported some lightheadedness/dizziness and disorientation the days prior to hospitalization while at home. However, no reports of LOC, seizure activity, focal deficits, or findings for acute intracranial pathology on most recent OSH CT head w/o contrast (02/15/21). Mental status improved, s/p extubation        DVT Prophylaxis: therapeutic warfarin  Guevara Catheter: not present  Code Status: Full  Code  Fluids: no IVFs  Lines: RIJ TDC (2/23)    Disposition Plan   Pending establishment of long-term dialysis needs      Entered: Alphonse Love MD 03/08/2021, 7:25 AM       The patient's care was discussed with Dr. Salmeron.    Alphonse Love MD   [facility] [address]  [facility]  Please see sign in/sign out for up to date coverage information  ______________________________________________________________________    Interval History   No complaints. No shortness of breath, palpitations, fevers or chills.     Data reviewed today: I reviewed all medications, new labs and imaging results over the last 24 hours.    Physical Exam   Vital Signs: Temp: 97.8  F (36.6  C) Temp src: Oral BP: 106/84 Pulse: 66   Resp: 18 SpO2: 97 % O2 Device: None (Room air)    Weight: 207 lbs 3.2 oz   Gen: awake and in NAD  HEENT: gauze in Left nares, RIJ TDC in place. JVP 10 cm  Heart: LVAD hum.   Lungs: Bilateral crackles. No wheezes  GI: Soft, nontender, nondistended.   Extremities: WWP, Trace LE edema  Neuro: grossly non focal  Skin: no rashes.    Data   Recent Labs   Lab 03/08/21  0508 03/07/21  1750 03/07/21  1700 03/07/21  0543 03/06/21  0430 03/05/21  0400 03/05/21  0400   WBC  --   --   --  4.5 4.4  --  5.4   HGB  --   --   --  7.9* 8.0*  --  7.7*   MCV  --   --   --  82 82  --  81   PLT  --   --   --  208 196  --  210   INR 2.67*  --   --  2.51* 2.43*  --  2.40*   * 131* Canceled, Test credited 133 133   < > 129*   POTASSIUM 3.3* 3.4 Canceled, Test credited 3.5 3.8   < > 3.8   CHLORIDE 96 96 Canceled, Test credited 97 98   < > 95   CO2 27 24 Canceled, Test credited 25 26   < > 24   BUN 72* 69* Canceled, Test credited 66* 51*   < > 71*   CR 4.75* 4.64* Canceled, Test credited 4.74* 3.93*   < > 5.59*   ANIONGAP 9 11 Canceled, Test credited 11 9   < > 11   CALOS 8.2* 8.5 Canceled, Test credited 8.2* 8.4*   < > 8.4*   * 150* Canceled, Test credited 109* 109*    < > 115*   ALBUMIN 2.7* 3.0* Canceled, Test credited 2.7* 2.6*   < > 2.6*   PROTTOTAL 7.2 7.6 Canceled, Test credited 7.1 7.0   < > 6.9   BILITOTAL 0.5 0.5 Canceled, Test credited 0.5 0.5   < > 0.5   ALKPHOS 121 123 Canceled, Test credited 119 121   < > 121   ALT 26 46 Canceled, Test credited 26 25   < > 26   AST 49* 48* Canceled, Test credited 48* 44   < > 44    < > = values in this interval not displayed.     No results found for this or any previous visit (from the past 24 hour(s)).  Medications     dextrose       dextrose Stopped (02/17/21 1600)     DOBUTamine 3 mcg/kg/min (03/07/21 2342)     Warfarin Therapy Reminder         sodium chloride 0.9%  250 mL Intravenous Once in dialysis     sodium chloride 0.9%  300 mL Hemodialysis Machine Once     allopurinol  100 mg Oral or Feeding Tube Daily     amiodarone  200 mg Oral Daily     aspirin  81 mg Oral Daily     bumetanide  4 mg Oral Daily     cephALEXin  500 mg Oral Q12H BRITTANI     finasteride  5 mg Oral Daily     FLUoxetine  30 mg Oral Daily     gelatin absorbable  1 each Topical During Hemodialysis (from stock)     hydrALAZINE  125 mg Oral Q8H BRITTANI     insulin aspart  1-7 Units Subcutaneous TID AC     insulin aspart  1-5 Units Subcutaneous At Bedtime     magnesium oxide  400 mg Oral BID     melatonin  3 mg Oral At Bedtime     multivitamin RENAL  1 capsule Oral Daily     - MEDICATION INSTRUCTIONS -   Does not apply Once     omeprazole  20 mg Oral QAM AC     oxymetazoline  2 spray Both Nostrils BID     senna-docusate  2 tablet Oral BID     tamsulosin  0.4 mg Oral Daily

## 2021-03-08 NOTE — PLAN OF CARE
SHIFT SUMMARY [id number]    D:  Pt admitted from OSH in cardiogenic shock and multiorgan failure. Pt's renal status has declined to be dialysis dependent at this point. Pt has PMH of heart mate 3 , DM 2, ABHINAV,anemia and HIT.  A/I:  Pt has had uneventful day. Pt remains in SR with 1st/BBB rates 70-90. Heart mate numbers WNL. Pt's edema improving, ace wraps applied to LE. Pt eating,drinking and voiding. FSBG WNL. Pt ambulated in sosa with SBA, increased SOB with activity. 02 sats remain in upper 90's on RA. Pt had questions about dialysis, discussed alternative to hemodialysis such as peritoneal dialysis. Pt verbalized that might be a better alternative if possible given dialysis centers close to his home remain 1.5 hours away 1 way. Instructed to discuss with nephology and cardiology. Pt given education resources and I pad.  P:  Pt to have dialysis tomorrow , continue on same medications and monitoring. Awaiting acceptance from a dialysis center near his home. Complicated d/t HM 3

## 2021-03-08 NOTE — PROGRESS NOTES
HEMODIALYSIS TREATMENT NOTE    Date: 3/8/2021  Time: 11:02 AM    Data:  Pre Wt: 93.3 kg (205 lb 11 oz)   Desired Wt: 92.26 kg   Post Wt: 91.8 kg (202 lb 6.1 oz)  Weight change: 1.5 kg  Ultrafiltration - Post Run Net Total Removed (mL): 1500 mL  Vascular Access Status: CVC  patent  Dialyzer Rinse: Streaked, Light  Total Blood Volume Processed: 68.74 L   Total Dialysis (Treatment) Time: 3   Dialysate Bath: K 4, Ca 3  Heparin: None    Lab:   No    Interventions:  On LVAD cart  Fluid goal increased to 1.5L due to good BP    Assessment:  Pt vitally stable through out. Pt denies pain/SOB. Treatment unremarkable. Handoff given to floor KRISTEN Smith.     Plan:    Per renal.

## 2021-03-08 NOTE — PROGRESS NOTES
03/08/21 1508   Signing Clinician's Name / Credentials   Signing clinician's name / credentials Blanca Nieto, PT, DPT   Dynamic Gait Index (Johnnie and Tran Haider, 1995)   Gait Level Surface 2   Change in Gait Speed 2   Gait and Horizontal Head Turns 2   Gait with Vertical Head Turns 2   Gait and Pivot Turns 2   Step Over Obstacle 1   Step Around Obstacles 2   Steps 2   Total Dynamic Gait Index Score  (A score of 19 or less has been correlated to an increased risk of falls in community dwelling older adults, patients with vestibular disorders, and patients with MS.)   Total Score (out of 24) 15     Dynamic Gait Index (DGI):The DGI is a measure of balance during gait that is reliable and valid for the elderly and individuals with Parkinson's disease, MS, vestibular disorders, or s/p stroke. Gait assistive device used: None    Patient score: 15/24  Scores ?19/24 indicate an increased risk for falls according to Ana et al 2000  Minimal Detectable Change = 2.9 in community dwelling elderly according to Inder et al 2011    Assessment (rationale for performing, application to patient s function & care plan): DGI completed to assess dynamic balance and fall risk. Pt scoring 2/3 on most items 2/2 gait aid (walking stick) utilized during assessment.  Minutes billed as physical performance test

## 2021-03-08 NOTE — PROGRESS NOTES
Nephrology Progress Note  03/08/2021         Assessment & Recommendations:   64 yo M with PMHx  NICM  s/p HM III , VT, severe MR, atrial fibrillation on warfarin, hypertension, CKD IV, DMII, HIT presented to OSH with fevers and cough in the setting of syncopal episode transferred to [facility] for further cares. Hospitalization complicated by Afib with RVR with hypotension and intubation with upgrade of cares to the ICU. Found to be in septic shock 2/2 legionella pneumonia with possible cardiogenic shock. Nephrology was consulted for evaluation and management of anuric BERNARDA. Started on RRT 2/16, remains dialysis dependent.      Dialysis dependent non-oligouric BERNARDA 2/2 ischemic ATN  Baseline Cr last yr s/p LVAD placement was around 1. On admission ~2.3 and doubled within 24 hours with rapid decrease in UOP. UA unimpressive with baseline proteinuria and new granular casts. Initiated on RRT 2/16. Had initially hoped he would have enough renal recovery to avoid dialysis, however he remains dialysis dependent.   - HD MWF   - Continue bumex 4 mg BID  - Avoid nephrotoxins as able  - Renally dose meds  - Monitor I/Os  - Appreciate SW & Care Coordinator assistance in arranging outpatient dialysis     Volume status  Euvolemic to slight hypervolemic. Tolerated UF 2L without any issues 3/3.  - Daily weights, Is/Os  - UF 1-1.5L as tolerated     Electrolytes   Mild Hyponatremia - CTM     Acid/base - stable   Anemia - Hb 8.2, stable. Will provide iron with next HD session    Recommendations were communicated to primary team via this note    Seen and discussed with Dr. Ramona Mabry MD   [phone]    Interval History :   Nursing and provider notes from last 24 hours reviewed.  In the last 24 hours Eliseo Tanner remained stable. Denied chest pain or SOB during HD session.    Physical Exam:   I/O last 3 completed shifts:  In: 1015.8 [P.O.:960; I.V.:55.8]  Out: 3575 [Urine:2075; Other:1500]   BP (!) 119/95 (BP  Location: Left arm)   Pulse 80   Temp 97.8  F (36.6  C) (Oral)   Resp 16   Wt 91.8 kg (202 lb 6.1 oz)   SpO2 98%   BMI 31.70 kg/m       General : Pt awake, not in acute distress   Lungs : anterior lung fields are clear  Cardiac : LVAD mechanical sounds  Abdomen : Soft/ND/NT  LE : Edema noted  Dialysis Access : right perm cath    Labs:   All labs reviewed by me  Electrolytes/Renal -   Recent Labs   Lab Test 03/08/21  0508 03/07/21  1750 03/07/21  1700 03/07/21  0543 03/06/21  0430 02/22/21  1539 02/22/21  1539 02/22/21  0353 02/22/21  0353 02/21/21  1617 02/21/21  1617   * 131* Canceled, Test credited 133 133   < >  --    < > 136   < >  --    POTASSIUM 3.3* 3.4 Canceled, Test credited 3.5 3.8   < >  --    < > 4.7   < >  --    CHLORIDE 96 96 Canceled, Test credited 97 98   < >  --    < > 106   < >  --    CO2 27 24 Canceled, Test credited 25 26   < >  --    < > 24   < >  --    BUN 72* 69* Canceled, Test credited 66* 51*   < >  --    < > 30   < >  --    CR 4.75* 4.64* Canceled, Test credited 4.74* 3.93*   < >  --    < > 1.94*   < >  --    * 150* Canceled, Test credited 109* 109*   < >  --    < > 109*   < >  --    CALOS 8.2* 8.5 Canceled, Test credited 8.2* 8.4*   < >  --    < > 8.1*   < >  --    MAG 1.9  --   --  1.4* 1.5*   < > 2.6*  --  2.5*  --  2.6*   PHOS  --   --   --   --   --   --  5.2*  --  4.4  --  4.5    < > = values in this interval not displayed.       CBC -   Recent Labs   Lab Test 03/07/21  0543 03/06/21  0430 03/05/21  0400   WBC 4.5 4.4 5.4   HGB 7.9* 8.0* 7.7*    196 210       LFTs -   Recent Labs   Lab Test 03/08/21  0508 03/07/21  1750 03/07/21  1700   ALKPHOS 121 123 Canceled, Test credited   BILITOTAL 0.5 0.5 Canceled, Test credited   ALT 26 46 Canceled, Test credited   AST 49* 48* Canceled, Test credited   PROTTOTAL 7.2 7.6 Canceled, Test credited   ALBUMIN 2.7* 3.0* Canceled, Test credited       Iron Panel -   Recent Labs   Lab Test 02/27/21  0415 11/16/20  0616  02/08/20  0408   IRON 28* 16* 25*   IRONSAT 9* 6* 8*   HEAVENLY 276 75 189     Current Medications:    allopurinol  100 mg Oral or Feeding Tube Daily     amiodarone  200 mg Oral Daily     aspirin  81 mg Oral Daily     bumetanide  4 mg Oral Daily     cephALEXin  500 mg Oral Q12H BRITTANI     finasteride  5 mg Oral Daily     FLUoxetine  30 mg Oral Daily     hydrALAZINE  125 mg Oral Q8H BRITTANI     insulin aspart  1-7 Units Subcutaneous TID AC     insulin aspart  1-5 Units Subcutaneous At Bedtime     magnesium oxide  400 mg Oral BID     melatonin  3 mg Oral At Bedtime     multivitamin RENAL  1 capsule Oral Daily     omeprazole  20 mg Oral QAM AC     oxymetazoline  2 spray Both Nostrils BID     senna-docusate  2 tablet Oral BID     tamsulosin  0.4 mg Oral Daily     warfarin ANTICOAGULANT  4 mg Oral ONCE at 18:00       dextrose       dextrose Stopped (02/17/21 1600)     DOBUTamine 3 mcg/kg/min (03/08/21 1553)     Warfarin Therapy Reminder       Pao Mabry MD

## 2021-03-08 NOTE — PROGRESS NOTES
HX:65 year old male with PMHx relevant for NICM with LVEF 15-20%, NYHA III s/p HM III 2/18/2020 (post op complicated by retrosternal hematoma with bleeding in the lungs), s/p ICD placed on 3/16/2020, h/o MSSA driveline infection and bacteremia 11/5/2020 on prophylactic cephalexin, h/o VT on amiodarone, moderate nonobstructive CAD, severe MR, atrial fibrillation on warfarin, hypertension, ABHINAV requiring CPAP, CKD IV, DMII, HLP,  h/o HIT who presented to the Cardiovascular Unit (4E Cardiac ICU) for escalation of cares after an RRT was called for persistent (hemodynamically stable) ventricular tachycardia vs. Atrial flutter    Cardiac:SR  VS:MAPs 101, 104  IV:DL PICC with dobutamine at 3 mcg/kg/min  Tubes:NA  Neuro:A/Ox4  Resp:C/O shortness of breath with exercise  GI/:voiding spontaneously, also on HD  Endo:No sliding scale needed for glucose values  Skin:intact other than driveline site  Pain:denies pain  Social:no visitors present, cellphone at bedside  Plan:continue HD, dobutamine, plan for discontinue to home or ARU when local dialysis unit receives VAD training

## 2021-03-08 NOTE — PLAN OF CARE
D:  Admitted 2/15 with mixed cardiogenic and distributive shock with intermittent wide complex tachycardia. Had pneumonia now resolved. Persistent renal failure requiring HD now. Hx of NICM (LVEF 15-20%), NYHA III s/p HM 3 on 2/18/2020, s/p ICD, h/o MSSA infection and bacteremia, VT on amiodarone, moderate nonobstructive CAD, severe MR, afib on warfarin, HTN, ABHINAV (CPAP), CKD 4, HLP, HIT, and type 2 diabetes.     I: Monitored vitals and assessed pt status.   Changed:  Running: Dobutamine 3 mcg/kg/min (9 mL/hr)   PRN:    A: A0x4, [place]. Able to express needs. SR w/ 1st AVB in 70-90s. LVAD #'s WDL. CPAP on @ noc otherwise room air. Afebrile. Urinating adequately. AC/HS blood glucose checks. On 2g Na diet. BLE compression wraps on, +1/+2 edema. Up SBA with cane.     P: Continue to monitor Pt status and report changes to Cards 2. Plan for dialysis run today 3/8.

## 2021-03-08 NOTE — PROGRESS NOTES
D: Saw patient for routine VAD Coordinator rounding. No VAD related questions or concerns at this time.  Still awaiting HD plan  I: Discussed POC and provided support and listened to patient and care giver's thoughts and concerns.  P: Continue to follow patient and address any questions or concerns patient and or caregiver may have.

## 2021-03-09 ENCOUNTER — APPOINTMENT (OUTPATIENT)
Dept: OCCUPATIONAL THERAPY | Facility: CLINIC | Age: 66
DRG: 870 | End: 2021-03-09
Attending: INTERNAL MEDICINE
Payer: MEDICARE

## 2021-03-09 LAB
ALBUMIN SERPL-MCNC: 2.9 G/DL (ref 3.4–5)
ALBUMIN SERPL-MCNC: 3.1 G/DL (ref 3.4–5)
ALP SERPL-CCNC: 124 U/L (ref 40–150)
ALP SERPL-CCNC: 134 U/L (ref 40–150)
ALT SERPL W P-5'-P-CCNC: 26 U/L (ref 0–70)
ALT SERPL W P-5'-P-CCNC: 30 U/L (ref 0–70)
ANION GAP SERPL CALCULATED.3IONS-SCNC: 8 MMOL/L (ref 3–14)
ANION GAP SERPL CALCULATED.3IONS-SCNC: 8 MMOL/L (ref 3–14)
APTT PPP: 40 SEC (ref 22–37)
AST SERPL W P-5'-P-CCNC: 44 U/L (ref 0–45)
AST SERPL W P-5'-P-CCNC: 48 U/L (ref 0–45)
BILIRUB SERPL-MCNC: 0.5 MG/DL (ref 0.2–1.3)
BILIRUB SERPL-MCNC: 0.5 MG/DL (ref 0.2–1.3)
BUN SERPL-MCNC: 48 MG/DL (ref 7–30)
BUN SERPL-MCNC: 55 MG/DL (ref 7–30)
CALCIUM SERPL-MCNC: 8.7 MG/DL (ref 8.5–10.1)
CALCIUM SERPL-MCNC: 8.8 MG/DL (ref 8.5–10.1)
CHLORIDE SERPL-SCNC: 101 MMOL/L (ref 94–109)
CHLORIDE SERPL-SCNC: 97 MMOL/L (ref 94–109)
CO2 SERPL-SCNC: 27 MMOL/L (ref 20–32)
CO2 SERPL-SCNC: 27 MMOL/L (ref 20–32)
CREAT SERPL-MCNC: 3.11 MG/DL (ref 0.66–1.25)
CREAT SERPL-MCNC: 3.17 MG/DL (ref 0.66–1.25)
ERYTHROCYTE [DISTWIDTH] IN BLOOD BY AUTOMATED COUNT: 22.4 % (ref 10–15)
GFR SERPL CREATININE-BSD FRML MDRD: 19 ML/MIN/{1.73_M2}
GFR SERPL CREATININE-BSD FRML MDRD: 20 ML/MIN/{1.73_M2}
GLUCOSE BLDC GLUCOMTR-MCNC: 105 MG/DL (ref 70–99)
GLUCOSE BLDC GLUCOMTR-MCNC: 114 MG/DL (ref 70–99)
GLUCOSE BLDC GLUCOMTR-MCNC: 133 MG/DL (ref 70–99)
GLUCOSE BLDC GLUCOMTR-MCNC: 99 MG/DL (ref 70–99)
GLUCOSE SERPL-MCNC: 116 MG/DL (ref 70–99)
GLUCOSE SERPL-MCNC: 98 MG/DL (ref 70–99)
HCT VFR BLD AUTO: 27.3 % (ref 40–53)
HGB BLD-MCNC: 8.4 G/DL (ref 13.3–17.7)
INR PPP: 2.68 (ref 0.86–1.14)
LABORATORY COMMENT REPORT: NORMAL
LDH SERPL L TO P-CCNC: 291 U/L (ref 85–227)
MAGNESIUM SERPL-MCNC: 1.5 MG/DL (ref 1.6–2.3)
MCH RBC QN AUTO: 25.7 PG (ref 26.5–33)
MCHC RBC AUTO-ENTMCNC: 30.8 G/DL (ref 31.5–36.5)
MCV RBC AUTO: 84 FL (ref 78–100)
PLATELET # BLD AUTO: 186 10E9/L (ref 150–450)
POTASSIUM SERPL-SCNC: 3.5 MMOL/L (ref 3.4–5.3)
POTASSIUM SERPL-SCNC: 3.6 MMOL/L (ref 3.4–5.3)
PROT SERPL-MCNC: 7.6 G/DL (ref 6.8–8.8)
PROT SERPL-MCNC: 8.3 G/DL (ref 6.8–8.8)
RBC # BLD AUTO: 3.27 10E12/L (ref 4.4–5.9)
SARS-COV-2 RNA RESP QL NAA+PROBE: NEGATIVE
SODIUM SERPL-SCNC: 132 MMOL/L (ref 133–144)
SODIUM SERPL-SCNC: 136 MMOL/L (ref 133–144)
SPECIMEN SOURCE: NORMAL
WBC # BLD AUTO: 4.2 10E9/L (ref 4–11)

## 2021-03-09 PROCEDURE — 999N001017 HC STATISTIC GLUCOSE BY METER IP

## 2021-03-09 PROCEDURE — 80053 COMPREHEN METABOLIC PANEL: CPT | Performed by: INTERNAL MEDICINE

## 2021-03-09 PROCEDURE — 214N000001 HC R&B CCU UMMC

## 2021-03-09 PROCEDURE — 99232 SBSQ HOSP IP/OBS MODERATE 35: CPT | Mod: CS | Performed by: INTERNAL MEDICINE

## 2021-03-09 PROCEDURE — 85730 THROMBOPLASTIN TIME PARTIAL: CPT | Performed by: INTERNAL MEDICINE

## 2021-03-09 PROCEDURE — U0003 INFECTIOUS AGENT DETECTION BY NUCLEIC ACID (DNA OR RNA); SEVERE ACUTE RESPIRATORY SYNDROME CORONAVIRUS 2 (SARS-COV-2) (CORONAVIRUS DISEASE [COVID-19]), AMPLIFIED PROBE TECHNIQUE, MAKING USE OF HIGH THROUGHPUT TECHNOLOGIES AS DESCRIBED BY CMS-2020-01-R: HCPCS | Performed by: INTERNAL MEDICINE

## 2021-03-09 PROCEDURE — U0005 INFEC AGEN DETEC AMPLI PROBE: HCPCS | Performed by: INTERNAL MEDICINE

## 2021-03-09 PROCEDURE — 85027 COMPLETE CBC AUTOMATED: CPT | Performed by: INTERNAL MEDICINE

## 2021-03-09 PROCEDURE — 85610 PROTHROMBIN TIME: CPT | Performed by: INTERNAL MEDICINE

## 2021-03-09 PROCEDURE — 83615 LACTATE (LD) (LDH) ENZYME: CPT | Performed by: INTERNAL MEDICINE

## 2021-03-09 PROCEDURE — 250N000011 HC RX IP 250 OP 636: Performed by: STUDENT IN AN ORGANIZED HEALTH CARE EDUCATION/TRAINING PROGRAM

## 2021-03-09 PROCEDURE — 250N000013 HC RX MED GY IP 250 OP 250 PS 637: Performed by: STUDENT IN AN ORGANIZED HEALTH CARE EDUCATION/TRAINING PROGRAM

## 2021-03-09 PROCEDURE — 97110 THERAPEUTIC EXERCISES: CPT | Mod: GO | Performed by: OCCUPATIONAL THERAPIST

## 2021-03-09 PROCEDURE — 83735 ASSAY OF MAGNESIUM: CPT | Performed by: INTERNAL MEDICINE

## 2021-03-09 PROCEDURE — 250N000013 HC RX MED GY IP 250 OP 250 PS 637: Performed by: INTERNAL MEDICINE

## 2021-03-09 RX ORDER — MAGNESIUM OXIDE 400 MG/1
400 TABLET ORAL 2 TIMES DAILY
Status: COMPLETED | OUTPATIENT
Start: 2021-03-09 | End: 2021-03-10

## 2021-03-09 RX ORDER — WARFARIN SODIUM 4 MG/1
4 TABLET ORAL
Status: COMPLETED | OUTPATIENT
Start: 2021-03-09 | End: 2021-03-09

## 2021-03-09 RX ORDER — MAGNESIUM OXIDE 400 MG/1
400 TABLET ORAL 2 TIMES DAILY
Status: DISCONTINUED | OUTPATIENT
Start: 2021-03-09 | End: 2021-03-09

## 2021-03-09 RX ADMIN — DOBUTAMINE HYDROCHLORIDE 3 MCG/KG/MIN: 200 INJECTION INTRAVENOUS at 17:08

## 2021-03-09 RX ADMIN — Medication 1 CAPSULE: at 09:20

## 2021-03-09 RX ADMIN — TAMSULOSIN HYDROCHLORIDE 0.4 MG: 0.4 CAPSULE ORAL at 09:18

## 2021-03-09 RX ADMIN — DOCUSATE SODIUM 50 MG AND SENNOSIDES 8.6 MG 2 TABLET: 8.6; 5 TABLET, FILM COATED ORAL at 09:20

## 2021-03-09 RX ADMIN — HYDRALAZINE HYDROCHLORIDE 125 MG: 100 TABLET, FILM COATED ORAL at 13:23

## 2021-03-09 RX ADMIN — OMEPRAZOLE 20 MG: 20 CAPSULE, DELAYED RELEASE ORAL at 09:20

## 2021-03-09 RX ADMIN — ALLOPURINOL 100 MG: 100 TABLET ORAL at 09:20

## 2021-03-09 RX ADMIN — CEPHALEXIN 500 MG: 500 CAPSULE ORAL at 21:06

## 2021-03-09 RX ADMIN — AMIODARONE HYDROCHLORIDE 200 MG: 200 TABLET ORAL at 09:22

## 2021-03-09 RX ADMIN — MAGNESIUM OXIDE 400 MG: 400 TABLET ORAL at 09:21

## 2021-03-09 RX ADMIN — BUMETANIDE 4 MG: 2 TABLET ORAL at 09:19

## 2021-03-09 RX ADMIN — ASPIRIN 81 MG CHEWABLE TABLET 81 MG: 81 TABLET CHEWABLE at 09:18

## 2021-03-09 RX ADMIN — WARFARIN SODIUM 4 MG: 4 TABLET ORAL at 17:08

## 2021-03-09 RX ADMIN — SALINE NASAL SPRAY 1 SPRAY: 1.5 SOLUTION NASAL at 09:25

## 2021-03-09 RX ADMIN — ZOLPIDEM TARTRATE 5 MG: 5 TABLET, FILM COATED ORAL at 21:07

## 2021-03-09 RX ADMIN — MAGNESIUM OXIDE 400 MG: 400 TABLET ORAL at 21:06

## 2021-03-09 RX ADMIN — HYDRALAZINE HYDROCHLORIDE 125 MG: 100 TABLET, FILM COATED ORAL at 21:06

## 2021-03-09 RX ADMIN — FLUOXETINE 30 MG: 20 CAPSULE ORAL at 09:18

## 2021-03-09 RX ADMIN — HYDRALAZINE HYDROCHLORIDE 125 MG: 100 TABLET, FILM COATED ORAL at 05:05

## 2021-03-09 RX ADMIN — FINASTERIDE 5 MG: 5 TABLET, FILM COATED ORAL at 09:22

## 2021-03-09 RX ADMIN — OXYMETAZOLINE HYDROCHLORIDE 2 SPRAY: 0.05 SPRAY NASAL at 09:26

## 2021-03-09 RX ADMIN — CEPHALEXIN 500 MG: 500 CAPSULE ORAL at 09:18

## 2021-03-09 ASSESSMENT — ACTIVITIES OF DAILY LIVING (ADL)
ADLS_ACUITY_SCORE: 14

## 2021-03-09 NOTE — PROGRESS NOTES
"CLINICAL NUTRITION SERVICES - REASSESSMENT NOTE     Nutrition Prescription    RECOMMENDATIONS FOR MDs/PROVIDERS TO ORDER:  None at this time.     Malnutrition Status:    Patient does not meet two of the established criteria necessary for diagnosing malnutrition.    Recommendations already ordered by Registered Dietitian (RD):  None additionally at this time.    Future/Additional Recommendations:  1. Continue current diet, as ordered. Monitor potential need for K+ or phos restrictions. Diet restrictions may be added as a \"Combination Diet.\"   2. Order a phos binder if needed. Check phos lab. Phos of 5.2 on 2/22/21. May need to modify Boost Plus to Boost Glucose Control if phos remains elevated.   3. Continue nephrocaps, as ordered, since pt is on dialysis.   4. Monitor BG control. DM2. Hgb A1c of 6.8 on 1/6/21 and BG was 114 on 3/9.   5. Monitor iron status.     EVALUATION OF THE PROGRESS TOWARD GOALS   Diet: 2 g Sodium since 3/3. Ordered to receive Boost Plus, strawberry at 14:00 and vanilla at HS.   Intake: Tolerating diet. Per % intake flowsheets, pt consuming % with a good appetite 3/3 and 3/5, then 100% with a good appetite 3/6-3/9. Per pt, he has a good appetite and he is eating normal amounts. Eating lunch during visit today (3/9). States he is consuming his oral supplements. [employer] (meal ordering system) indicates food/beverages sent 3/6-3/9 totaled 1826 kcals and 87 g protein daily on average. Nearly meeting protein needs if consuming 100% of meals but not quite meeting kcals needs. Typically ordering three meals per day but ordered two meals on 3/9. Potential factors affecting oral intake include NPO at times for tests/procedures, LOS, food choices, and diet order.     NEW FINDINGS   GI: Pt having zero to one stool per day. Formed, brown stools. Last stool on 3/8.  Wt Hx: 88.7 kg (4/2/20), 93 kg (5/19/20), 95.3 kg (11/18/20), 96.2 kg (12/17/20), 94.6 kg (1/8/21), 100.3 kg (2/16/21, admit), 95.3 " kg (2/24), 96.3 kg (3/3), 89.3 kg (3/9) - Difficult to assess wt changes due to fluid status changes, diuresing, and now on dialysis.      ASSESSED NUTRITION NEEDS (updated)  Dosing Weight: 73 kg (adjusted, based on lowest wt this admission so far this admit of 89.3 kg on 3/9/21)   Estimated Energy Needs: 6077-6695 kcals/day (30-35 kcals/kg)  Justification Estimated needs on dialysis, but rec the low end of this range due to wt status  Estimated Protein Needs:  grams protein/day (1.2-1.8 grams of pro/kg)  Justification: Increased needs on dialysis  Estimated Protein Needs: ~2077-1034 mL/day  Justification: Estimated needs on dialysis, pending fluid status and UOP    MALNUTRITION  % Intake: Not meeting this criteria.     % Weight Loss: Difficult to assess wt changes due to fluid status changes, diuresing, and now on dialysis.    Subcutaneous Fat Loss: None observed  Muscle Loss: None observed  Fluid Accumulation/Edema: Trace  Malnutrition Diagnosis: Patient does not meet two of the established criteria necessary for diagnosing malnutrition    Previous Goals   Patient to consume % of nutritionally adequate meal trays TID, or the equivalent with supplements/snacks.  Evaluation: Not meeting consistently.    Previous Nutrition Diagnosis  Inadequate oral intake related to decreased appetite but improving as evidenced by [employer] (meal ordering system) indicates food/beverages sent 2/28-3/2 totaled 1570 kcals and 83 g protein daily on average which does not meet estimated needs of 8267-1105 kcals/day (30-35 kcals/kg) and  grams protein/day (1.2-1.8 grams of pro/kg).    Evaluation: Unresolved. Changed to new nutrition dx below.     CURRENT NUTRITION DIAGNOSIS  Inadequate energy intake related to NPO at times for tests/procedures, LOS, food choices, and diet order as evidenced by [employer] (meal ordering system) indicates food/beverages sent 3/6-3/9 totaled 1826 kcals which does not meet estimated  needs of 0888-2236 kcals/day (30-35 kcals/kg).    INTERVENTIONS  Implementation  Nutrition education for nutrition relationship to health/disease, Medical food supplement therapy: Encouraged oral intake and intake at meals. Encouraged intake of oral supplements.     Goals  Patient to consume % of nutritionally adequate meal trays TID, or the equivalent with supplements/snacks.    Monitoring/Evaluation  Progress toward goals will be monitored and evaluated per protocol.     Nutrition will continue to follow.      Abby Woods, MS, RD, LD, [facility]   6C Pgr: [phone]

## 2021-03-09 NOTE — PLAN OF CARE
Pt admitted 2/16/2021 from OSH for cardiogenic and septic shock and tachyarrhythmia. Pt was extubated 2/20. Pt had tunneled catheter placed on 2/23. Pmhx: ICM with LVEF 15-20%, NYHA III s/p HM III 2/18/2020, s/p ICD placed on 3/16/2020, h/o MSSA DL infx and bacteremia 11/5/2020 on prophylactic cephalexin, h/o VT on amiodarone, moderate nonobstructive CAD, severe MR, atrial fibrillation on warfarin, hypertension, ABHINAV, CKD IV, DMII, HLP,  h/o HIT    Code status: full     Team: cards 2  Running: Dobutamine @  3 mcg/kg/min          Neuro: ao*4, [place]  Cardiac/Tele:  SR w/ bundle block, LVAD #'s WNL's  Respiratory: room air  GI/: urinating via urinal, LBM 3/8 per pt   Diet/Appetite: 2g na  Skin: LVAD dressing CDI and changed, generalized edema  Endocrine: ACHS, no insulin given   LDAs: double lumen PICC, CVC non tunneled CDI (for HD MWF)  Activity: independent   Pain: rhonda Mason, RN  Time cared for 0700 - 1530

## 2021-03-09 NOTE — PLAN OF CARE
D:pt has small size half of pea in diameter of green drainage  I:cleaned LVAD site  A:currently dry and intact  P:Primary aware of drianage

## 2021-03-09 NOTE — PLAN OF CARE
D: Admitted 2/15 with mixed cardiogenic and distributive shock with intermittent wide complex tachycardia. Had pneumonia now resolved. Persistent renal failure requiring HD now. Hx of NICM (LVEF 15-20%), NYHA III s/p HM 3 on 2/18/2020, s/p ICD, h/o MSSA infection and bacteremia, VT on amiodarone, moderate nonobstructive CAD, severe MR, afib on warfarin, HTN, ABHINAV (CPAP), CKD 4, HLP, HIT, and type 2 diabetes.     I: Monitored vitals and assessed pt status.   Changed:  Running: dobutamine 3 mcg/kg/min (9 mL/hr)  PRN:    A: A0x4, able to express needs. SR w/ 1st degree AVB. Room air. Afebrile. Produces urine (on HD), see flow sheets for output. Denies pain. LVAD #'s WDL, no alarms. +1 edema LE. Last BM 3/8. Reports MIRANDA. Blood glucose checks AC/HS. Labs drawn this am.     P: Continue to monitor Pt status and report changes to Cards 2. Pending discharge once dialysis place set up.      Some SOB with exertion  No overt signs of CHF  Cont present meds  Urged weight loss   Cont f/u with Cardio

## 2021-03-09 NOTE — PROGRESS NOTES
[facility]    Cardiology Progress Note- Cards 2    Date of Admission:  2/15/2021     Today's Plan  - Awaiting Social work arranging dialysis and placement  - Continue Dobutamine gtt at 3, will likely discontinue prior to discharge    Assessment & Plan: ADIN Tanner is a 65 year old male with PMHx relevant for NICM with LVEF 15-20%, NYHA III s/p HM III 2/18/2020 (post op complicated by retrosternal hematoma with bleeding in the lungs), s/p ICD placed on 3/16/2020, h/o MSSA driveline infection and bacteremia 11/5/2020 on prophylactic cephalexin, h/o VT on amiodarone, moderate nonobstructive CAD, severe MR, atrial fibrillation on warfarin, hypertension, ABHINAV requiring CPAP, CKD IV, DMII, HLP,  h/o HIT who presented to the Cardiovascular Unit (4E Cardiac ICU) with mixed cardiogenic and distributive shock withan intermittent wide complex tachycardia. Initially requiring vasopressors and inotropes, intubated for hypoxemia. Treated for legionella pneumonia. Stabilized on low dose dobutamine off of pressors. Extubated. With persistent oliguric renal failure.       Cardiovascular:     #Mixed Cardiogenic and Septic Shock - Improved  #Multiorgan Failure - Improved  Presented from [facility] with subacute development of shortness of breath, dyspnea on exertion, dizziness/lightheadedness with near syncopal event found to be febrile with intermittent wide complex tachycardia. Recent COVID19 moderna vaccination. CT chest showing bilateral infiltrates. Community acquired pneumonia vs. Possible recurrent of MSSA bacteremia WBC 24.5, Procal 7.95, , Lactacte 6.9. Completed course of IV antibiotics.     Mildly elevated filling pressures on presentation CVP 18, PA 35/20, PCWP 11. Likely some component of cardiogenic shock. LVAD parameters relatively stable despite markedly elevated LDH 8,531. Euvolemic on exam. Worsening renal function, Cr 4.62,  up-trending LFT's/ INR. Legionella antigen positive. Weaned off of vasopressor and maintained on dobutamine. Acute hepatic injury which is improving. Improving renal function with increasing UOP and downtrending creatinine, last but continues to require dialysis. Shock resolved.      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 Bhatti (ID clinic)   # Troponin elevation (likely demand ischemia)  # Essential Hypertension  # Hyperlipidemia        Patient with long standing history of NICM previously implanted LVAD (HM III) on 02/18/20 due to worsening functional status and systolic ventricular dysfunction (further complicated by RV dysfunction by VAD hemodynamic compensatory mechanism, VT in ICU now on Amiodarone and Atrial Fibrillation with AVR requiring DCCV on 02/28/20).      Elevated cardiac biomarkers on presentation, SGC with only mildly elevated filling pressures. Euvolemic on exam. Troponin elevation likely related to demand ischemia with no concerns for ACS. Most recent VAD interrogation without any significant Flow-Alarms     LDH 8,531. Initial concern for LVAD thrombus. However given relatively stable LVAD parameters, degree of LDH elevation is out of proportion. Will continue to monitor for LVAD alarms.      - continue dobutamine for renal recovery- Continue low dose dobutamine @3  - Held ACE-I/ARB/ARNi: Lisinopril; due to acute renal failure  - No BB; deferred 2/2 shock  - Aldosterone antagonist: deferred while other medical therapy is optimized  - SCD prophylaxis: ICD  - Fluid status : fairly euvolemic, Bumex on non-dialysis days,  further management per dialysis  - MAP Goal: 65-90  - On Warfarin for HM-III INR Goal 2-3  - Continue Hydralazine  - Continue ASA 81 mg per oral daily       # Wide complex tachycardia. Atrial Flutter vs Afib vs Ventricular Tachycardia  # History of Atrial Fibrillation s/p DCCV/history of Atrial Flutter       Wide complex tachycardia HR 140s on presentation in the setting of febrile illness and likely pneumonia. Did not initially respond to adenosine. Concern for VT. Intermittently back into HR 60s with underlying rhythm of atrial flutter 3:1 conduction block. Per EP can not rule out VT, may be dual tachycardias. Has been in persistent atrial flutter. S/p CHAMP+DCCV, sucessfully maintaining NSR.       - continue home amiodarone  - Ensure K+ >4.0 mEq/L and Mg+2 >2.0  - continue warfarin  - On cardiac telemetry     Pulmonary  #Legionella Pneumonia - resolved  Hypoxemic in the setting of mixed cardiogenic septic shock requiring emergent intubation. O2 requirements quickly improved. S/p extubation on room air.     Infectious disease     #Sepsis  #Legionella Pneumonia  #Bilateral pulmonary infiltrates  CT showing bilateral diffuse patchy consolidations concerning for infectious process. Low suspicion for recurrent driveline or possible hardware infection (history of MSSA Chronic Driveline Infection). Urine without pyuria. Urine legionella ag positive.   - f/u blood cultures  - continue Abx as per ID              -Cefazolin (2/18/21 - 3/1/21) followed by PO cefalexin              Azithromycin (2/15/21 - 2/25/21)     Renal:  # Acute on Chronic CKD stage IV likely 2/2 Septic Shock   s/p L TDC receiving IHD  - IHD per nephrology  - Daily Weight's  - Strict I/O's  - Daily BMP's  - Avoid any additional nephrotoxicity   - intermittent bumetanide on non-dialysis days     GI  # Shock Liver   # Congestive Hepatopathy - resolved  Hepatoccelular pattern of liver injury  > 3,496, AST 1,441 > 8,490 likely 2/2 to septic shock. INR  downtrended s/p 3mg IV vit K. RUQ US without portal vein thrombus. LFTs downtrended nicely. No evidence of synthetic dysfunction.      Hematological   # Chronic Microcytic/Hypochromic Anemia (likely related to iron deficiency)  # Coagulopathy related to sepsis   # IgG Kappa monoclonal Gammopathy of undetermined significance  - Monitor Hgb (baseline 8-9g/dL)  - transfuse for Hb < 7.0   - Patient follows with [facility] and [facility] (Dr. Ibarra)     # Previous Concern For HIT  On previous hospitalization for LVAD placement (02/06/20-03/20/20), concern for HIT. Platelets 250K --> ~ 90K wuth documented history of exposure to unfractionate heparin products at OSH prior to his installation at the [facility].     At that time, patient underwent two HIT panel tests (first one was negative and second antibody test was positive). No known thrombosis events. DAVINA antibody was negative raising question about validity of diagnosis . Per hematology at the time (Dr. James), no other cause for isolated thrombocytopenia. Immediate recovery of plts following d/c of heparin. 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      - avoiding heparin products  - continue warfarin     Endocrine  # Type II DM     > Last Hgb A1C: 6.8 (01/06/21)     - Continue PTA Insulin Basaglar 10 Units Aq at bedtime  - On Medium sliding scale insulin  - Oral Hypoglycemia Protocol      Neurologic:  # Disorientation/Toxic Metabolic Encephalopathy - Resolved  # Chronic Intracranial Small Vessel Disease        Patient reported some lightheadedness/dizziness and disorientation the days prior to hospitalization while at home. However, no reports of LOC, seizure activity, focal deficits, or findings for acute intracranial pathology on most recent OSH CT head w/o contrast (02/15/21). Mental status improved, s/p extubation        DVT Prophylaxis: therapeutic warfarin  Guevara Catheter: not present  Code Status: Full Code  Fluids: no IVFs  Lines: MICHELINE LITTLE  (2/23)    Disposition Plan   Pending establishment of long-term dialysis needs      Entered: Alphonse Love MD 03/09/2021, 7:03 AM       The patient's care was discussed with Dr. Salmeron.    Alphonse Love MD   [facility] [id number]  [facility]  Please see sign in/sign out for up to date coverage information  ______________________________________________________________________    Interval History   No complaints. No shortness of breath, palpitations, fevers or chills. Continues to have goof UOP, with a total of 1.98L yesterday.     Data reviewed today: I reviewed all medications, new labs and imaging results over the last 24 hours.    Physical Exam   Vital Signs: Temp: 98  F (36.7  C) Temp src: Oral BP: 115/80 Pulse: 65   Resp: 18 SpO2: 96 % O2 Device: BiPAP/CPAP    Weight: 196 lbs 14.4 oz   Gen: awake and in NAD  HEENT: gauze in Left nares, RIJ TDC in place. JVP 10 cm  Heart: LVAD hum.   Lungs: Bilateral crackles. No wheezes  GI: Soft, nontender, nondistended.   Extremities: WWP, Trace LE edema  Neuro: grossly non focal  Skin: no rashes.    Data   Recent Labs   Lab 03/09/21  0510 03/08/21  1630 03/08/21  0508 03/07/21  0543 03/07/21  0543 03/06/21  0430   WBC 4.2  --   --   --  4.5 4.4   HGB 8.4*  --   --   --  7.9* 8.0*   MCV 84  --   --   --  82 82     --   --   --  208 196   INR 2.68*  --  2.67*  --  2.51* 2.43*    134 132*   < > 133 133   POTASSIUM 3.6 3.9 3.3*   < > 3.5 3.8   CHLORIDE 101 101 96   < > 97 98   CO2 27 25 27   < > 25 26   BUN 48* 34* 72*   < > 66* 51*   CR 3.17* 2.61* 4.75*   < > 4.74* 3.93*   ANIONGAP 8 8 9   < > 11 9   CALOS 8.8 9.2 8.2*   < > 8.2* 8.4*   * 142* 105*   < > 109* 109*   ALBUMIN 2.9* 3.0* 2.7*   < > 2.7* 2.6*   PROTTOTAL 7.6 7.8 7.2   < > 7.1 7.0   BILITOTAL 0.5 0.7 0.5   < > 0.5 0.5   ALKPHOS 124 133 121   < > 119 121   ALT 26 29 26   < > 26 25   AST 44 49* 49*   < > 48* 44    < > = values in  this interval not displayed.     No results found for this or any previous visit (from the past 24 hour(s)).  Medications     dextrose       dextrose Stopped (02/17/21 1600)     DOBUTamine 3 mcg/kg/min (03/08/21 6057)     Warfarin Therapy Reminder         allopurinol  100 mg Oral or Feeding Tube Daily     amiodarone  200 mg Oral Daily     aspirin  81 mg Oral Daily     bumetanide  4 mg Oral Daily     cephALEXin  500 mg Oral Q12H BRITTANI     finasteride  5 mg Oral Daily     FLUoxetine  30 mg Oral Daily     hydrALAZINE  125 mg Oral Q8H BRITTANI     insulin aspart  1-7 Units Subcutaneous TID AC     insulin aspart  1-5 Units Subcutaneous At Bedtime     melatonin  3 mg Oral At Bedtime     multivitamin RENAL  1 capsule Oral Daily     omeprazole  20 mg Oral QAM AC     oxymetazoline  2 spray Both Nostrils BID     senna-docusate  2 tablet Oral BID     tamsulosin  0.4 mg Oral Daily

## 2021-03-10 LAB
ALBUMIN SERPL-MCNC: 2.9 G/DL (ref 3.4–5)
ALBUMIN SERPL-MCNC: 3.2 G/DL (ref 3.4–5)
ALP SERPL-CCNC: 114 U/L (ref 40–150)
ALP SERPL-CCNC: 136 U/L (ref 40–150)
ALT SERPL W P-5'-P-CCNC: 27 U/L (ref 0–70)
ALT SERPL W P-5'-P-CCNC: 31 U/L (ref 0–70)
ANION GAP SERPL CALCULATED.3IONS-SCNC: 7 MMOL/L (ref 3–14)
ANION GAP SERPL CALCULATED.3IONS-SCNC: 9 MMOL/L (ref 3–14)
APTT PPP: 41 SEC (ref 22–37)
AST SERPL W P-5'-P-CCNC: 41 U/L (ref 0–45)
AST SERPL W P-5'-P-CCNC: 45 U/L (ref 0–45)
BILIRUB SERPL-MCNC: 0.5 MG/DL (ref 0.2–1.3)
BILIRUB SERPL-MCNC: 0.5 MG/DL (ref 0.2–1.3)
BUN SERPL-MCNC: 28 MG/DL (ref 7–30)
BUN SERPL-MCNC: 62 MG/DL (ref 7–30)
CALCIUM SERPL-MCNC: 8.2 MG/DL (ref 8.5–10.1)
CALCIUM SERPL-MCNC: 9 MG/DL (ref 8.5–10.1)
CHLORIDE SERPL-SCNC: 97 MMOL/L (ref 94–109)
CHLORIDE SERPL-SCNC: 99 MMOL/L (ref 94–109)
CO2 SERPL-SCNC: 27 MMOL/L (ref 20–32)
CO2 SERPL-SCNC: 28 MMOL/L (ref 20–32)
CREAT SERPL-MCNC: 1.97 MG/DL (ref 0.66–1.25)
CREAT SERPL-MCNC: 3.37 MG/DL (ref 0.66–1.25)
ERYTHROCYTE [DISTWIDTH] IN BLOOD BY AUTOMATED COUNT: 22.3 % (ref 10–15)
GFR SERPL CREATININE-BSD FRML MDRD: 18 ML/MIN/{1.73_M2}
GFR SERPL CREATININE-BSD FRML MDRD: 34 ML/MIN/{1.73_M2}
GLUCOSE BLDC GLUCOMTR-MCNC: 102 MG/DL (ref 70–99)
GLUCOSE BLDC GLUCOMTR-MCNC: 106 MG/DL (ref 70–99)
GLUCOSE BLDC GLUCOMTR-MCNC: 187 MG/DL (ref 70–99)
GLUCOSE BLDC GLUCOMTR-MCNC: 208 MG/DL (ref 70–99)
GLUCOSE SERPL-MCNC: 107 MG/DL (ref 70–99)
GLUCOSE SERPL-MCNC: 175 MG/DL (ref 70–99)
HCT VFR BLD AUTO: 25.3 % (ref 40–53)
HGB BLD-MCNC: 7.8 G/DL (ref 13.3–17.7)
INR PPP: 2.8 (ref 0.86–1.14)
LDH SERPL L TO P-CCNC: 286 U/L (ref 85–227)
MAGNESIUM SERPL-MCNC: 1.7 MG/DL (ref 1.6–2.3)
MCH RBC QN AUTO: 25 PG (ref 26.5–33)
MCHC RBC AUTO-ENTMCNC: 30.8 G/DL (ref 31.5–36.5)
MCV RBC AUTO: 81 FL (ref 78–100)
PLATELET # BLD AUTO: 201 10E9/L (ref 150–450)
POTASSIUM SERPL-SCNC: 3.4 MMOL/L (ref 3.4–5.3)
POTASSIUM SERPL-SCNC: 3.4 MMOL/L (ref 3.4–5.3)
PROT SERPL-MCNC: 7.3 G/DL (ref 6.8–8.8)
PROT SERPL-MCNC: 8.1 G/DL (ref 6.8–8.8)
RBC # BLD AUTO: 3.12 10E12/L (ref 4.4–5.9)
SODIUM SERPL-SCNC: 134 MMOL/L (ref 133–144)
SODIUM SERPL-SCNC: 134 MMOL/L (ref 133–144)
WBC # BLD AUTO: 4.6 10E9/L (ref 4–11)

## 2021-03-10 PROCEDURE — 80053 COMPREHEN METABOLIC PANEL: CPT | Performed by: INTERNAL MEDICINE

## 2021-03-10 PROCEDURE — 99232 SBSQ HOSP IP/OBS MODERATE 35: CPT | Mod: GC | Performed by: INTERNAL MEDICINE

## 2021-03-10 PROCEDURE — 83735 ASSAY OF MAGNESIUM: CPT | Performed by: INTERNAL MEDICINE

## 2021-03-10 PROCEDURE — 83615 LACTATE (LD) (LDH) ENZYME: CPT | Performed by: INTERNAL MEDICINE

## 2021-03-10 PROCEDURE — 250N000013 HC RX MED GY IP 250 OP 250 PS 637: Performed by: STUDENT IN AN ORGANIZED HEALTH CARE EDUCATION/TRAINING PROGRAM

## 2021-03-10 PROCEDURE — 85027 COMPLETE CBC AUTOMATED: CPT | Performed by: INTERNAL MEDICINE

## 2021-03-10 PROCEDURE — 258N000003 HC RX IP 258 OP 636: Performed by: INTERNAL MEDICINE

## 2021-03-10 PROCEDURE — 250N000013 HC RX MED GY IP 250 OP 250 PS 637: Performed by: INTERNAL MEDICINE

## 2021-03-10 PROCEDURE — 90937 HEMODIALYSIS REPEATED EVAL: CPT

## 2021-03-10 PROCEDURE — 250N000011 HC RX IP 250 OP 636: Performed by: STUDENT IN AN ORGANIZED HEALTH CARE EDUCATION/TRAINING PROGRAM

## 2021-03-10 PROCEDURE — 85610 PROTHROMBIN TIME: CPT | Performed by: INTERNAL MEDICINE

## 2021-03-10 PROCEDURE — 999N001017 HC STATISTIC GLUCOSE BY METER IP

## 2021-03-10 PROCEDURE — 85730 THROMBOPLASTIN TIME PARTIAL: CPT | Performed by: INTERNAL MEDICINE

## 2021-03-10 PROCEDURE — 214N000001 HC R&B CCU UMMC

## 2021-03-10 RX ORDER — WARFARIN SODIUM 3 MG/1
3 TABLET ORAL
Status: COMPLETED | OUTPATIENT
Start: 2021-03-10 | End: 2021-03-10

## 2021-03-10 RX ORDER — OXYMETAZOLINE HYDROCHLORIDE 0.05 G/100ML
2 SPRAY NASAL 2 TIMES DAILY PRN
Status: DISCONTINUED | OUTPATIENT
Start: 2021-03-10 | End: 2021-03-17 | Stop reason: HOSPADM

## 2021-03-10 RX ADMIN — SODIUM CHLORIDE 300 ML: 9 INJECTION, SOLUTION INTRAVENOUS at 08:45

## 2021-03-10 RX ADMIN — HYDRALAZINE HYDROCHLORIDE 125 MG: 100 TABLET, FILM COATED ORAL at 06:40

## 2021-03-10 RX ADMIN — HYDRALAZINE HYDROCHLORIDE 125 MG: 100 TABLET, FILM COATED ORAL at 21:12

## 2021-03-10 RX ADMIN — FINASTERIDE 5 MG: 5 TABLET, FILM COATED ORAL at 07:43

## 2021-03-10 RX ADMIN — AMIODARONE HYDROCHLORIDE 200 MG: 200 TABLET ORAL at 07:43

## 2021-03-10 RX ADMIN — FLUOXETINE 30 MG: 20 CAPSULE ORAL at 07:42

## 2021-03-10 RX ADMIN — BUMETANIDE 4 MG: 2 TABLET ORAL at 07:42

## 2021-03-10 RX ADMIN — WARFARIN SODIUM 3 MG: 3 TABLET ORAL at 17:35

## 2021-03-10 RX ADMIN — ALLOPURINOL 100 MG: 100 TABLET ORAL at 07:43

## 2021-03-10 RX ADMIN — ASPIRIN 81 MG CHEWABLE TABLET 81 MG: 81 TABLET CHEWABLE at 07:42

## 2021-03-10 RX ADMIN — TAMSULOSIN HYDROCHLORIDE 0.4 MG: 0.4 CAPSULE ORAL at 07:43

## 2021-03-10 RX ADMIN — MAGNESIUM OXIDE 400 MG: 400 TABLET ORAL at 07:43

## 2021-03-10 RX ADMIN — DOCUSATE SODIUM 50 MG AND SENNOSIDES 8.6 MG 1 TABLET: 8.6; 5 TABLET, FILM COATED ORAL at 19:53

## 2021-03-10 RX ADMIN — SODIUM CHLORIDE 250 ML: 9 INJECTION, SOLUTION INTRAVENOUS at 08:46

## 2021-03-10 RX ADMIN — CEPHALEXIN 500 MG: 500 CAPSULE ORAL at 07:43

## 2021-03-10 RX ADMIN — Medication: at 08:46

## 2021-03-10 RX ADMIN — DOCUSATE SODIUM 50 MG AND SENNOSIDES 8.6 MG 1 TABLET: 8.6; 5 TABLET, FILM COATED ORAL at 07:42

## 2021-03-10 RX ADMIN — MAGNESIUM OXIDE 400 MG: 400 TABLET ORAL at 19:53

## 2021-03-10 RX ADMIN — ZOLPIDEM TARTRATE 5 MG: 5 TABLET, FILM COATED ORAL at 21:12

## 2021-03-10 RX ADMIN — CEPHALEXIN 500 MG: 500 CAPSULE ORAL at 19:53

## 2021-03-10 RX ADMIN — DOBUTAMINE HYDROCHLORIDE 3 MCG/KG/MIN: 200 INJECTION INTRAVENOUS at 20:34

## 2021-03-10 RX ADMIN — INSULIN ASPART 2 UNITS: 100 INJECTION, SOLUTION INTRAVENOUS; SUBCUTANEOUS at 17:36

## 2021-03-10 RX ADMIN — Medication 1 CAPSULE: at 07:43

## 2021-03-10 RX ADMIN — HYDRALAZINE HYDROCHLORIDE 125 MG: 100 TABLET, FILM COATED ORAL at 13:13

## 2021-03-10 RX ADMIN — OMEPRAZOLE 20 MG: 20 CAPSULE, DELAYED RELEASE ORAL at 07:43

## 2021-03-10 ASSESSMENT — ACTIVITIES OF DAILY LIVING (ADL)
ADLS_ACUITY_SCORE: 14

## 2021-03-10 NOTE — PROGRESS NOTES
Met with patient to review modality options. Reviewed questions about PD vs HD.  The following items were reviewed with patient:  Aranesp/Epogen, hemoglobin control  Renal diet and fluid management  Cycler vs manual PD therapy  Travel with dialysis  Training for exit site and management of PD therapy  Coordination with cardiac team for LVAD  Surgery consult and catheter plan  Urgent start vs healing of exit site  Family member training  Timing of dialysis and adequacy testing with lab frequency  IDT meetings with nephrology  Patient states he lives four hours outside of the [facility] area and is interested in an option that would reduce his travel needs while on dialysis.  Patient's wife is an EMT and would be supportive as a back up for training and management of the home therapy.  Patient provided the wife's number to call, would like to meet with PD nurse again tomorrow when wife is present.  Epic message sent to primary nephrologist, Dr. Olivier Yoon and the renal fellow that is currently managing the patient's care during this inpatient stay.  Will reach out to the patient's wife and plan to meet with patient again tomorrow for further education.

## 2021-03-10 NOTE — PROGRESS NOTES
Nephrology Progress Note  03/10/2021         Assessment & Recommendations:   66 yo M with PMHx  NICM  s/p HM III , VT, severe MR, atrial fibrillation on warfarin, hypertension, CKD IV, DMII, HIT presented to OSH with fevers and cough in the setting of syncopal episode transferred to [facility] for further cares. Hospitalization complicated by Afib with RVR with hypotension and intubation with upgrade of cares to the ICU. Found to be in septic shock 2/2 legionella pneumonia with possible cardiogenic shock. Nephrology was consulted for evaluation and management of anuric BERNARDA. Started on RRT 2/16, remains dialysis dependent as of now.      Dialysis dependent non-oligouric BERNARDA 2/2 ischemic ATN  Baseline Cr last yr s/p LVAD placement was around 1. On admission ~2.3 and doubled within 24 hours with rapid decrease in UOP. UA unimpressive with baseline proteinuria and new granular casts. Initiated on RRT 2/16. Had initially hoped he would have enough renal recovery to avoid dialysis, however he remains dialysis dependent for now. He is making very good amount of urine, makes me to look for clearance as well.   - HD MWF, but also assess for any renal recovery every day  - Continue bumex 4 mg BID  - Avoid nephrotoxins as able  - Renally dose meds  - Monitor I/Os  - Appreciate SW & Care Coordinator assistance in arranging outpatient dialysis     Volume status  Euvolemic to slight hypervolemic. Tolerated UF 2L without any issues 3/3.  - Daily weights, Is/Os  - UF 1-1.5L as tolerated     Electrolytes   Mild Hyponatremia - CTM     Acid/base - stable   Anemia - Hb 8.2, stable. Will provide iron with next HD session     Recommendations were communicated to primary team via this note     Seen and discussed with Dr. Ramona Mabry MD   [phone]    Interval History :   Nursing and provider notes from last 24 hours reviewed.  In the last 24 hours Eliseo Cunninghamse been stable. Underwent PD education briefly this  morning.    Physical Exam:   I/O last 3 completed shifts:  In: 1080 [P.O.:1080]  Out: 2930 [Urine:2930]   BP (!) 119/95   Pulse 68   Temp 98.4  F (36.9  C) (Oral)   Resp 12   Wt 90.1 kg (198 lb 9.6 oz)   SpO2 98%   BMI 31.11 kg/m       General : Pt awake, not in acute distress   Lungs : anterior lung fields are clear  Cardiac : S1, S2 present  Abdomen : Soft/ND/NT  LE : Edema noted  Dialysis Access : right perm cath    Labs:   All labs reviewed by me  Electrolytes/Renal -   Recent Labs   Lab Test 03/10/21  0415 03/09/21  1700 03/09/21  0510 03/08/21  0508 03/08/21  0508 02/22/21  1539 02/22/21  1539 02/22/21  0353 02/22/21  0353 02/21/21  1617 02/21/21  1617    132* 136   < > 132*   < >  --    < > 136   < >  --    POTASSIUM 3.4 3.5 3.6   < > 3.3*   < >  --    < > 4.7   < >  --    CHLORIDE 97 97 101   < > 96   < >  --    < > 106   < >  --    CO2 28 27 27   < > 27   < >  --    < > 24   < >  --    BUN 62* 55* 48*   < > 72*   < >  --    < > 30   < >  --    CR 3.37* 3.11* 3.17*   < > 4.75*   < >  --    < > 1.94*   < >  --    * 98 116*   < > 105*   < >  --    < > 109*   < >  --    CALOS 8.2* 8.7 8.8   < > 8.2*   < >  --    < > 8.1*   < >  --    MAG 1.7  --  1.5*  --  1.9   < > 2.6*  --  2.5*  --  2.6*   PHOS  --   --   --   --   --   --  5.2*  --  4.4  --  4.5    < > = values in this interval not displayed.       CBC -   Recent Labs   Lab Test 03/10/21  0415 03/09/21  0510 03/07/21  0543   WBC 4.6 4.2 4.5   HGB 7.8* 8.4* 7.9*    186 208       LFTs -   Recent Labs   Lab Test 03/10/21  0415 03/09/21  1700 03/09/21  0510   ALKPHOS 114 134 124   BILITOTAL 0.5 0.5 0.5   ALT 27 30 26   AST 41 48* 44   PROTTOTAL 7.3 8.3 7.6   ALBUMIN 2.9* 3.1* 2.9*       Iron Panel -   Recent Labs   Lab Test 02/27/21  0415 11/16/20  0616 02/08/20  0408   IRON 28* 16* 25*   IRONSAT 9* 6* 8*   HEAVENLY 276 75 189     Current Medications:    allopurinol  100 mg Oral or Feeding Tube Daily     amiodarone  200 mg Oral Daily      aspirin  81 mg Oral Daily     bumetanide  4 mg Oral Daily     cephALEXin  500 mg Oral Q12H BRITTANI     finasteride  5 mg Oral Daily     FLUoxetine  30 mg Oral Daily     gelatin absorbable  1 each Topical During Hemodialysis (from stock)     hydrALAZINE  125 mg Oral Q8H BRITTANI     insulin aspart  1-7 Units Subcutaneous TID AC     insulin aspart  1-5 Units Subcutaneous At Bedtime     magnesium oxide  400 mg Oral BID     melatonin  3 mg Oral At Bedtime     multivitamin RENAL  1 capsule Oral Daily     omeprazole  20 mg Oral QAM AC     senna-docusate  2 tablet Oral BID     sodium chloride (PF)  9 mL Intracatheter During Hemodialysis (from stock)     sodium chloride (PF)  9 mL Intracatheter During Hemodialysis (from stock)     tamsulosin  0.4 mg Oral Daily       dextrose       dextrose Stopped (02/17/21 1600)     DOBUTamine 3 mcg/kg/min (03/09/21 1943)     Warfarin Therapy Reminder       Pao Mabry MD

## 2021-03-10 NOTE — PLAN OF CARE
OT: Pt at dialysis when attempted in AM, OT will check back as available/appropriate or reschedule for 3/11.

## 2021-03-10 NOTE — PROGRESS NOTES
SPIRITUAL HEALTH SERVICES  SPIRITUAL ASSESSMENT Progress Note  [facility] 6C     REFERRAL SOURCE: LVAD  C[place]ia passed on patient request for  visit    Pt known to me from previous admissions. Pt updated me regarding how he has been doing since LVAD, reasons for this hospitalization, and his hopes for discharge soon. Prayer was welcomed.    PLAN: continue to follow, will visit later this week if pt still on unit.    Ad Krause M.Div. ([place]), [facility]  Staff   Pager [phone]      * [facility] remains available 24/7 for emergent requests/referrals, either by having the switchboard page the on-call  or by entering an ASAP/STAT consult in Epic (this will also page the on-call ). Routine Epic consults receive an initial response within 24 hours.*

## 2021-03-10 NOTE — PLAN OF CARE
D:pt wishes he could do dialysis closer to home in [facility]  I:We had a patient in the past that had a LVAD that signed a liability waver to have dialysis done in her town  A:pt would like to explore that as a possibility  P:Per Primary and SW

## 2021-03-10 NOTE — PROGRESS NOTES
[facility]    Cardiology Progress Note- Cards 2    Date of Admission:  2/15/2021     Today's Plan  - Awaiting Social work arranging dialysis and placement  - Continue Dobutamine gtt at 3, will likely discontinue prior to discharge  - Will touch base with nephrology to discuss possible HD holiday given continuing good UOP  - Will obtain Oxyhgb levels with morning labs tomorrow to assist in deciding inotrope infusion need    Assessment & Plan: S   Eliseo Tanner is a 65 year old male with PMHx relevant for NICM with LVEF 15-20%, NYHA III s/p HM III 2/18/2020 (post op complicated by retrosternal hematoma with bleeding in the lungs), s/p ICD placed on 3/16/2020, h/o MSSA driveline infection and bacteremia 11/5/2020 on prophylactic cephalexin, h/o VT on amiodarone, moderate nonobstructive CAD, severe MR, atrial fibrillation on warfarin, hypertension, ABHINAV requiring CPAP, CKD IV, DMII, HLP,  h/o HIT who presented to the Cardiovascular Unit (4E Cardiac ICU) with mixed cardiogenic and distributive shock withan intermittent wide complex tachycardia. Initially requiring vasopressors and inotropes, intubated for hypoxemia. Treated for legionella pneumonia. Stabilized on low dose dobutamine off of pressors. Extubated. Now on iHD  with improving renal status.      Cardiovascular:  #Mixed Cardiogenic and Septic Shock - Improved  #Multiorgan Failure - Improved  Presented from [facility] with subacute development of shortness of breath, dyspnea on exertion, dizziness/lightheadedness with near syncopal event found to be febrile with intermittent wide complex tachycardia. Recent COVID19 moderna vaccination. CT chest showing bilateral infiltrates. Community acquired pneumonia vs. Possible recurrent of MSSA bacteremia WBC 24.5, Procal 7.95, , Lactacte 6.9. Completed course of IV antibiotics.     Mildly elevated filling pressures on presentation CVP  18, PA 35/20, PCWP 11. Likely some component of cardiogenic shock. LVAD parameters relatively stable despite markedly elevated LDH 8,531. Euvolemic on exam. Worsening renal function, Cr 4.62, up-trending LFT's/ INR. Legionella antigen positive. Weaned off of vasopressor and maintained on dobutamine. Acute hepatic injury which is improving. Improving renal function with increasing UOP and downtrending creatinine, last but continues to require dialysis. Shock resolved.      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 Bhatti (ID clinic)   # Troponin elevation (likely demand ischemia)  # Essential Hypertension  # Hyperlipidemia  Patient with long standing history of NICM previously implanted LVAD (HM III) on 02/18/20 due to worsening functional status and systolic ventricular dysfunction (further complicated by RV dysfunction by VAD hemodynamic compensatory mechanism, VT in ICU now on Amiodarone and Atrial Fibrillation with AVR requiring DCCV on 02/28/20).      Elevated cardiac biomarkers on presentation, SGC with only mildly elevated filling pressures. Euvolemic on exam. Troponin elevation likely related to demand ischemia with no concerns for ACS. Most recent VAD interrogation without any significant Flow-Alarms     LDH 8,531. Initial concern for LVAD thrombus. However given relatively stable LVAD parameters, degree of LDH elevation is out of proportion. Will continue to monitor for LVAD alarms.      - continue dobutamine for renal recovery- Continue low dose dobutamine @3  - Held ACE-I/ARB/ARNi: Lisinopril; due to acute renal failure  - No BB;  deferred 2/2 shock  - Aldosterone antagonist: deferred while other medical therapy is optimized  - SCD prophylaxis: ICD  - Fluid status : fairly euvolemic, Bumex on non-dialysis days, further management per dialysis  - MAP Goal: 65-90  - On Warfarin for HM-III INR Goal 2-3  - Continue Hydralazine  - Continue ASA 81 mg per oral daily       # Wide complex tachycardia. Atrial Flutter vs Afib vs Ventricular Tachycardia  # History of Atrial Fibrillation s/p DCCV/history of Atrial Flutter   Wide complex tachycardia HR 140s on presentation in the setting of febrile illness and likely pneumonia. Did not initially respond to adenosine. Concern for VT. Intermittently back into HR 60s with underlying rhythm of atrial flutter 3:1 conduction block. Per EP can not rule out VT, may be dual tachycardias. Has been in persistent atrial flutter. S/p CHAMP+DCCV, sucessfully maintaining NSR.       - continue home amiodarone  - Ensure K+ >4.0 mEq/L and Mg+2 >2.0  - continue warfarin  - On cardiac telemetry     Pulmonary  #Legionella Pneumonia - resolved  Hypoxemic in the setting of mixed cardiogenic septic shock requiring emergent intubation. O2 requirements quickly improved. S/p extubation on room air.     Infectious disease     #Sepsis  #Legionella Pneumonia  #Bilateral pulmonary infiltrates  CT showing bilateral diffuse patchy consolidations concerning for infectious process. Low suspicion for recurrent driveline or possible hardware infection (history of MSSA Chronic Driveline Infection). Urine without pyuria. Urine legionella ag positive.   - f/u blood cultures  - continue Abx as per ID              -Cefazolin (2/18/21 - 3/1/21) followed by PO cefalexin              Azithromycin (2/15/21 - 2/25/21)     Renal:  # Acute on Chronic CKD stage IV likely 2/2 Septic Shock   s/p L TDC receiving IHD with improving status suggestive of renal recovery  - IHD per nephrology  - Daily Weight's  - Strict I/O's  - Daily BMP's  - Avoid any additional  nephrotoxicity   - intermittent bumetanide on non-dialysis days     GI  # Shock Liver - resolved  # Congestive Hepatopathy - resolved  Hepatoccelular pattern of liver injury  > 3,496, AST 1,441 > 8,490 likely 2/2 to septic shock. INR downtrended s/p 3mg IV vit K. RUQ US without portal vein thrombus. LFTs downtrended nicely. No evidence of synthetic dysfunction.      Hematological   # Chronic Microcytic/Hypochromic Anemia (likely related to iron deficiency)  # Coagulopathy related to sepsis   # IgG Kappa monoclonal Gammopathy of undetermined significance  - Monitor Hgb (baseline 8-9g/dL)  - transfuse for Hb < 7.0   - Patient [facility] with [facility] (Dr. Ibarra)     # Previous Concern For HIT  On previous hospitalization for LVAD placement (02/06/20-03/20/20), concern for HIT. Platelets 250K --> ~ 90K wuth documented history of exposure to unfractionate heparin products at OSH prior to his installation at the [facility].     At that time, patient underwent two HIT panel tests (first one was negative and second antibody test was positive). No known thrombosis events. DAVINA antibody was negative raising question about validity of diagnosis . Per hematology at the time (Dr. James), no other cause for isolated thrombocytopenia. Immediate recovery of plts following d/c of heparin. 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      - avoiding heparin products  - continue warfarin     Endocrine  # Type II DM     > Last Hgb A1C: 6.8 (01/06/21)  - Continue PTA Insulin Basaglar 10 Units Aq at bedtime  - On Medium sliding scale insulin  - Oral Hypoglycemia Protocol         DVT Prophylaxis: therapeutic warfarin  Guevara Catheter: not present  Code Status: Full Code  Fluids: no IVFs  Lines: RIROBERTO TD (2/23)    Disposition Plan   Pending establishment of long-term dialysis needs      Entered: Alphonse Love MD 03/10/2021, 6:59 AM       The patient's care was discussed with   Jaron.    Kayechwgreyson Love MD   [facility] [id number]  [facility]  Please see sign in/sign out for up to date coverage information  ______________________________________________________________________    Interval History   No complaints. No shortness of breath, palpitations, fevers or chills. Continues to have good UOP, with a total of 2.8L yesterday.     Data reviewed today: I reviewed all medications, new labs and imaging results over the last 24 hours.    Physical Exam   Vital Signs: Temp: 97.6  F (36.4  C) Temp src: Oral BP: (!) 118/90 Pulse: 79   Resp: 16 SpO2: 98 % O2 Device: None (Room air)    Weight: 198 lbs 9.6 oz   Gen: awake and in NAD  HEENT: gauze in Left nares, RIJ TDC in place. JVP ~8-10 cm  Heart: LVAD hum.   Lungs: Bilateral crackles. No wheezes  GI: Soft, nontender, nondistended.   Extremities: WWP, Trace LE edema  Neuro: grossly non focal  Skin: no rashes.    Data   Recent Labs   Lab 03/10/21  0415 03/09/21  1700 03/09/21  0510 03/08/21  0508 03/08/21  0508 03/07/21  0543 03/07/21  0543   WBC 4.6  --  4.2  --   --   --  4.5   HGB 7.8*  --  8.4*  --   --   --  7.9*   MCV 81  --  84  --   --   --  82     --  186  --   --   --  208   INR 2.80*  --  2.68*  --  2.67*  --  2.51*    132* 136   < > 132*   < > 133   POTASSIUM 3.4 3.5 3.6   < > 3.3*   < > 3.5   CHLORIDE 97 97 101   < > 96   < > 97   CO2 28 27 27   < > 27   < > 25   BUN 62* 55* 48*   < > 72*   < > 66*   CR 3.37* 3.11* 3.17*   < > 4.75*   < > 4.74*   ANIONGAP 9 8 8   < > 9   < > 11   CALOS 8.2* 8.7 8.8   < > 8.2*   < > 8.2*   * 98 116*   < > 105*   < > 109*   ALBUMIN 2.9* 3.1* 2.9*   < > 2.7*   < > 2.7*   PROTTOTAL 7.3 8.3 7.6   < > 7.2   < > 7.1   BILITOTAL 0.5 0.5 0.5   < > 0.5   < > 0.5   ALKPHOS 114 134 124   < > 121   < > 119   ALT 27 30 26   < > 26   < > 26   AST 41 48* 44   < > 49*   < > 48*    < > = values in this interval not displayed.     No results found for  this or any previous visit (from the past 24 hour(s)).  Medications     dextrose       dextrose Stopped (02/17/21 1600)     DOBUTamine 3 mcg/kg/min (03/09/21 1943)     Warfarin Therapy Reminder         sodium chloride 0.9%  250 mL Intravenous Once in dialysis     sodium chloride 0.9%  300 mL Hemodialysis Machine Once     allopurinol  100 mg Oral or Feeding Tube Daily     amiodarone  200 mg Oral Daily     aspirin  81 mg Oral Daily     bumetanide  4 mg Oral Daily     cephALEXin  500 mg Oral Q12H BRITTANI     finasteride  5 mg Oral Daily     FLUoxetine  30 mg Oral Daily     gelatin absorbable  1 each Topical During Hemodialysis (from stock)     hydrALAZINE  125 mg Oral Q8H BRITTANI     insulin aspart  1-7 Units Subcutaneous TID AC     insulin aspart  1-5 Units Subcutaneous At Bedtime     magnesium oxide  400 mg Oral BID     melatonin  3 mg Oral At Bedtime     multivitamin RENAL  1 capsule Oral Daily     - MEDICATION INSTRUCTIONS -   Does not apply Once     omeprazole  20 mg Oral QAM AC     senna-docusate  2 tablet Oral BID     sodium chloride (PF)  9 mL Intracatheter During Hemodialysis (from stock)     sodium chloride (PF)  9 mL Intracatheter During Hemodialysis (from stock)     tamsulosin  0.4 mg Oral Daily

## 2021-03-10 NOTE — PROGRESS NOTES
HEMODIALYSIS TREATMENT NOTE    Date: 3/10/2021  Time: 12:22 PM    Data:  Pre Wt: 90.1 kg (198 lb 10.2 oz)   Desired Wt: 88.6 kg   Post Wt: 88.7 kg (195 lb 8.8 oz)  Weight change: 1.4 kg  Ultrafiltration - Post Run Net Total Removed (mL): 1400 mL  Vascular Access Status: CVC  patent  Dialyzer Rinse: Light  Total Blood Volume Processed: 75.75 L   Total Dialysis (Treatment) Time: 3.5 hrs   Dialysate Bath: K 4, Ca 3  Heparin: None    Lab:   No    Interventions:  Changed dsg to PCAD. Slight blood drainage, overall intact, c/d/i.     Pt noticed high pulse index on LVAD monitor, running >4 to 5.3, typically runs ~3.6. Notified LVAD coordinator Joana. Discussed that increased index could be r/t dehydration, i.e. too much fluid pulled. Stated she will make this known to the team. Consider reduced UF goals during dialysis runs.     Assessment:  Assumed cares from Aaliyah Blair RN about 1 hr in to run. A&O. VSS through the run. Offered no c/o. Denied pain. On LVAD monitor at bedside. Tolerated run well, per VS able to pull desired amount. Very little edema on UE/LEs. PCAD patent, no flow issues.      Plan:    Return to floor.     TITO VázquezN, RN

## 2021-03-10 NOTE — PLAN OF CARE
Admitted 2/15 from an outside hospital with mixed cardiogenic and distributive shock, BERNARDA, and legionella pneumonia. Pneumonia now resolved. Medical history includes: NICM (LVEF 15-20%) s/p HM3 (02/2020) and ICD (03/2020), MSSA driveline infection and bacteremia, VT on amiodarone, nonobstructive CAD, severe MR, atrial fibrillation, HTN, ABHINAV on home CPAP, CKD IV, DM2, HIT. VSS, sinus rhythm, denies pain, on room air with clear / diminished lung sounds. Dobutamine running continuous. Dialyzed today with plan to continue dialysis outpatient. PICC line caps changed, [place] bath done, VAD dressing change done / alarms in place and functional. No remarkable VAD numbers to report. Patient voiding well and labs showing improved kidney function. Nose bleed on shift with PRN Afrin available. Continuing to monitor.

## 2021-03-10 NOTE — PLAN OF CARE
D: Admitted 2/15/2021 with mixed cardiogenic and distributive shock with intermittent wide-complex tachycardia c/b hypoxemia requiring intubation, BERNARDA, and legionella pneumonia. PMHx relevant for NICM (LVEF 15-20%) s/p HM3 (02/2020) and ICD (03/2020), MSSA driveline infection and bacteremia, VT on amiodarone, nonobstructive CAD, severe MR, atrial fibrillation, HTN, ABHINAV on home CPAP, CKD IV, DMII, HIT.     I: Monitored vitals and assessed pt status.   Running:  -Dobutamine at 3 mcg/kg/min      A: A0x4. VSS, on room air/home CPAP with sleep. Sinus rhythm on monitor. HM3 LVAD without alarms and numbers WNL over night.. PCU collect from double-lumen PICC. Tunneled dialysis CVC CDI. Afebrile. Denies pain. 2g Na diet. Blood sugar checks ACHS/0200. On HD MWF. Voiding well. Total of 780 mL out this shift. Appeared to sleep well over night. Up with SBA.     Temp:  [97.6  F (36.4  C)-98.3  F (36.8  C)] 97.6  F (36.4  C)  Pulse:  [70-83] 72  Resp:  [16-18] 16  BP: (108-123)/(82-94) 123/94  SpO2:  [97 %-98 %] 98 %      P: Anticipate dialysis run today. Continue to monitor Pt status and report changes to cards 2.

## 2021-03-10 NOTE — PROGRESS NOTES
LVAD Social Work Services Progress Note      Date of Initial Social Work Evaluation: 2/16/2021  Collaborated with: [facility] ([place] ph: [phone] f: [phone]), pt's wife, Veronique, via phone     Data: Pt with hx of LVAD implant 2/18/2020. Pt admitted 2/16/2021 from OSH for cardiogenic and septic shock and multiorgan failure. Pt was extubated 2/20. Pt had tunneled catheter placed on 2/23.   Pt continuing to need dialysis. Closest facility to pt's home that has agreed to be trained is [facility], which is 1.[address]hrs one way from pt's home. There are no options closer to pt's home; these facilities have been explored and either do not have availability or have declined pt due to the LVAD. Long-term goal is home PD vs Home HD vs outpatient at [facility] [facility] (30 minutes from pt's home). [facility] has a home PD and hemo program.     Intervention: Supportive Phone Call, Discharge Planning   Assessment: Continuing with making arrangements for outpatient dialysis. Soonest available pt will be able to discharge will be early April when [facility] expects to be trained. Discussed with pt's wife today.   Education provided by : Ongoing Social Work support, discharge planning  Plan:    Discharge Plans in Progress: The following outpatient dialysis referrals have been made:     [facility] ([facility] [phone]): Approved to accept pt for HD. Accepting nephrologist is Dr. Vila. [place] is finalizing insurance and education and have already reached out to industry to start LVAD education. Completion of education not anticipated until early April.     [facility]: Received call from intake last week  asking for more clinical notes to be sent. Right now indicating that he would need to be stable from a cardiac standpoint for 30 days, but willing to review clinical information as pt was admitted with pneumonia.     [facility]: Reviewed clinicals and have declined pt for admission to their dialysis  [place].  [facility]: Globally declined as there are no facilities within what they feel would be a reasonable distance of travel for the pt.        Barriers to d/c plan: Dialysis Placement     Follow up Plan: SW to continue to follow.

## 2021-03-11 ENCOUNTER — APPOINTMENT (OUTPATIENT)
Dept: PHYSICAL THERAPY | Facility: CLINIC | Age: 66
DRG: 870 | End: 2021-03-11
Attending: INTERNAL MEDICINE
Payer: MEDICARE

## 2021-03-11 ENCOUNTER — APPOINTMENT (OUTPATIENT)
Dept: OCCUPATIONAL THERAPY | Facility: CLINIC | Age: 66
DRG: 870 | End: 2021-03-11
Attending: INTERNAL MEDICINE
Payer: MEDICARE

## 2021-03-11 LAB
ALBUMIN SERPL-MCNC: 2.9 G/DL (ref 3.4–5)
ALBUMIN SERPL-MCNC: 3.3 G/DL (ref 3.4–5)
ALP SERPL-CCNC: 124 U/L (ref 40–150)
ALP SERPL-CCNC: 133 U/L (ref 40–150)
ALT SERPL W P-5'-P-CCNC: 31 U/L (ref 0–70)
ALT SERPL W P-5'-P-CCNC: 34 U/L (ref 0–70)
ANION GAP SERPL CALCULATED.3IONS-SCNC: 6 MMOL/L (ref 3–14)
ANION GAP SERPL CALCULATED.3IONS-SCNC: 7 MMOL/L (ref 3–14)
APTT PPP: 38 SEC (ref 22–37)
AST SERPL W P-5'-P-CCNC: 44 U/L (ref 0–45)
AST SERPL W P-5'-P-CCNC: 45 U/L (ref 0–45)
BASE EXCESS BLDV CALC-SCNC: 2.9 MMOL/L
BILIRUB SERPL-MCNC: 0.4 MG/DL (ref 0.2–1.3)
BILIRUB SERPL-MCNC: 0.5 MG/DL (ref 0.2–1.3)
BUN SERPL-MCNC: 42 MG/DL (ref 7–30)
BUN SERPL-MCNC: 48 MG/DL (ref 7–30)
CALCIUM SERPL-MCNC: 8.4 MG/DL (ref 8.5–10.1)
CALCIUM SERPL-MCNC: 8.5 MG/DL (ref 8.5–10.1)
CHLORIDE SERPL-SCNC: 95 MMOL/L (ref 94–109)
CHLORIDE SERPL-SCNC: 99 MMOL/L (ref 94–109)
CO2 SERPL-SCNC: 28 MMOL/L (ref 20–32)
CO2 SERPL-SCNC: 29 MMOL/L (ref 20–32)
CREAT SERPL-MCNC: 2.48 MG/DL (ref 0.66–1.25)
CREAT SERPL-MCNC: 2.6 MG/DL (ref 0.66–1.25)
ERYTHROCYTE [DISTWIDTH] IN BLOOD BY AUTOMATED COUNT: 22.2 % (ref 10–15)
GFR SERPL CREATININE-BSD FRML MDRD: 24 ML/MIN/{1.73_M2}
GFR SERPL CREATININE-BSD FRML MDRD: 26 ML/MIN/{1.73_M2}
GLUCOSE BLDC GLUCOMTR-MCNC: 103 MG/DL (ref 70–99)
GLUCOSE BLDC GLUCOMTR-MCNC: 107 MG/DL (ref 70–99)
GLUCOSE BLDC GLUCOMTR-MCNC: 126 MG/DL (ref 70–99)
GLUCOSE SERPL-MCNC: 111 MG/DL (ref 70–99)
GLUCOSE SERPL-MCNC: 120 MG/DL (ref 70–99)
HCO3 BLDV-SCNC: 28 MMOL/L (ref 21–28)
HCT VFR BLD AUTO: 28.3 % (ref 40–53)
HGB BLD-MCNC: 8.6 G/DL (ref 13.3–17.7)
INR PPP: 2.6 (ref 0.86–1.14)
LDH SERPL L TO P-CCNC: 293 U/L (ref 85–227)
MAGNESIUM SERPL-MCNC: 1.5 MG/DL (ref 1.6–2.3)
MCH RBC QN AUTO: 25.7 PG (ref 26.5–33)
MCHC RBC AUTO-ENTMCNC: 30.4 G/DL (ref 31.5–36.5)
MCV RBC AUTO: 85 FL (ref 78–100)
O2/TOTAL GAS SETTING VFR VENT: 21 %
OXYHGB MFR BLDV: 62 %
PCO2 BLDV: 43 MM HG (ref 40–50)
PH BLDV: 7.42 PH (ref 7.32–7.43)
PLATELET # BLD AUTO: 219 10E9/L (ref 150–450)
PO2 BLDV: 34 MM HG (ref 25–47)
POTASSIUM SERPL-SCNC: 3.3 MMOL/L (ref 3.4–5.3)
POTASSIUM SERPL-SCNC: 3.8 MMOL/L (ref 3.4–5.3)
PROT SERPL-MCNC: 7.7 G/DL (ref 6.8–8.8)
PROT SERPL-MCNC: 8.1 G/DL (ref 6.8–8.8)
RBC # BLD AUTO: 3.35 10E12/L (ref 4.4–5.9)
SODIUM SERPL-SCNC: 131 MMOL/L (ref 133–144)
SODIUM SERPL-SCNC: 133 MMOL/L (ref 133–144)
WBC # BLD AUTO: 4.9 10E9/L (ref 4–11)

## 2021-03-11 PROCEDURE — 97112 NEUROMUSCULAR REEDUCATION: CPT | Mod: GP | Performed by: PHYSICAL THERAPIST

## 2021-03-11 PROCEDURE — 97110 THERAPEUTIC EXERCISES: CPT | Mod: GP | Performed by: PHYSICAL THERAPIST

## 2021-03-11 PROCEDURE — 250N000013 HC RX MED GY IP 250 OP 250 PS 637: Performed by: STUDENT IN AN ORGANIZED HEALTH CARE EDUCATION/TRAINING PROGRAM

## 2021-03-11 PROCEDURE — 999N000157 HC STATISTIC RCP TIME EA 10 MIN

## 2021-03-11 PROCEDURE — 82805 BLOOD GASES W/O2 SATURATION: CPT | Performed by: STUDENT IN AN ORGANIZED HEALTH CARE EDUCATION/TRAINING PROGRAM

## 2021-03-11 PROCEDURE — 85027 COMPLETE CBC AUTOMATED: CPT | Performed by: INTERNAL MEDICINE

## 2021-03-11 PROCEDURE — 85730 THROMBOPLASTIN TIME PARTIAL: CPT | Performed by: INTERNAL MEDICINE

## 2021-03-11 PROCEDURE — 99231 SBSQ HOSP IP/OBS SF/LOW 25: CPT | Mod: GC | Performed by: INTERNAL MEDICINE

## 2021-03-11 PROCEDURE — 999N001017 HC STATISTIC GLUCOSE BY METER IP

## 2021-03-11 PROCEDURE — 83735 ASSAY OF MAGNESIUM: CPT | Performed by: INTERNAL MEDICINE

## 2021-03-11 PROCEDURE — 80053 COMPREHEN METABOLIC PANEL: CPT | Performed by: INTERNAL MEDICINE

## 2021-03-11 PROCEDURE — 250N000011 HC RX IP 250 OP 636: Performed by: STUDENT IN AN ORGANIZED HEALTH CARE EDUCATION/TRAINING PROGRAM

## 2021-03-11 PROCEDURE — 85610 PROTHROMBIN TIME: CPT | Performed by: INTERNAL MEDICINE

## 2021-03-11 PROCEDURE — 214N000001 HC R&B CCU UMMC

## 2021-03-11 PROCEDURE — 250N000013 HC RX MED GY IP 250 OP 250 PS 637: Performed by: INTERNAL MEDICINE

## 2021-03-11 PROCEDURE — 83615 LACTATE (LD) (LDH) ENZYME: CPT | Performed by: INTERNAL MEDICINE

## 2021-03-11 PROCEDURE — 97110 THERAPEUTIC EXERCISES: CPT | Mod: GO | Performed by: OCCUPATIONAL THERAPIST

## 2021-03-11 RX ORDER — POTASSIUM CHLORIDE 750 MG/1
20 TABLET, EXTENDED RELEASE ORAL ONCE
Status: COMPLETED | OUTPATIENT
Start: 2021-03-11 | End: 2021-03-11

## 2021-03-11 RX ORDER — POTASSIUM CHLORIDE 750 MG/1
20 TABLET, EXTENDED RELEASE ORAL ONCE
Status: DISCONTINUED | OUTPATIENT
Start: 2021-03-11 | End: 2021-03-11

## 2021-03-11 RX ORDER — MAGNESIUM OXIDE 400 MG/1
400 TABLET ORAL 2 TIMES DAILY
Status: COMPLETED | OUTPATIENT
Start: 2021-03-11 | End: 2021-03-12

## 2021-03-11 RX ADMIN — DOCUSATE SODIUM 50 MG AND SENNOSIDES 8.6 MG 1 TABLET: 8.6; 5 TABLET, FILM COATED ORAL at 07:43

## 2021-03-11 RX ADMIN — FINASTERIDE 5 MG: 5 TABLET, FILM COATED ORAL at 07:43

## 2021-03-11 RX ADMIN — ZOLPIDEM TARTRATE 5 MG: 5 TABLET, FILM COATED ORAL at 21:07

## 2021-03-11 RX ADMIN — WARFARIN SODIUM 3.5 MG: 2.5 TABLET ORAL at 17:43

## 2021-03-11 RX ADMIN — Medication 1 CAPSULE: at 07:42

## 2021-03-11 RX ADMIN — HYDRALAZINE HYDROCHLORIDE 125 MG: 100 TABLET, FILM COATED ORAL at 21:07

## 2021-03-11 RX ADMIN — FLUOXETINE 30 MG: 20 CAPSULE ORAL at 07:41

## 2021-03-11 RX ADMIN — MAGNESIUM OXIDE 400 MG: 400 TABLET ORAL at 07:41

## 2021-03-11 RX ADMIN — DOCUSATE SODIUM 50 MG AND SENNOSIDES 8.6 MG 2 TABLET: 8.6; 5 TABLET, FILM COATED ORAL at 19:51

## 2021-03-11 RX ADMIN — TAMSULOSIN HYDROCHLORIDE 0.4 MG: 0.4 CAPSULE ORAL at 07:42

## 2021-03-11 RX ADMIN — HYDRALAZINE HYDROCHLORIDE 125 MG: 100 TABLET, FILM COATED ORAL at 05:06

## 2021-03-11 RX ADMIN — MAGNESIUM OXIDE 400 MG: 400 TABLET ORAL at 19:51

## 2021-03-11 RX ADMIN — AMIODARONE HYDROCHLORIDE 200 MG: 200 TABLET ORAL at 07:42

## 2021-03-11 RX ADMIN — POTASSIUM CHLORIDE 20 MEQ: 750 TABLET, EXTENDED RELEASE ORAL at 19:51

## 2021-03-11 RX ADMIN — ALLOPURINOL 100 MG: 100 TABLET ORAL at 07:42

## 2021-03-11 RX ADMIN — HYDRALAZINE HYDROCHLORIDE 10 MG: 20 INJECTION INTRAMUSCULAR; INTRAVENOUS at 21:13

## 2021-03-11 RX ADMIN — ASPIRIN 81 MG CHEWABLE TABLET 81 MG: 81 TABLET CHEWABLE at 07:42

## 2021-03-11 RX ADMIN — DOBUTAMINE HYDROCHLORIDE 3 MCG/KG/MIN: 200 INJECTION INTRAVENOUS at 21:12

## 2021-03-11 RX ADMIN — HYDRALAZINE HYDROCHLORIDE 125 MG: 100 TABLET, FILM COATED ORAL at 12:30

## 2021-03-11 RX ADMIN — BUMETANIDE 4 MG: 2 TABLET ORAL at 07:42

## 2021-03-11 RX ADMIN — CEPHALEXIN 500 MG: 500 CAPSULE ORAL at 19:51

## 2021-03-11 RX ADMIN — OMEPRAZOLE 20 MG: 20 CAPSULE, DELAYED RELEASE ORAL at 07:43

## 2021-03-11 RX ADMIN — CEPHALEXIN 500 MG: 500 CAPSULE ORAL at 07:42

## 2021-03-11 ASSESSMENT — ACTIVITIES OF DAILY LIVING (ADL)
ADLS_ACUITY_SCORE: 14

## 2021-03-11 NOTE — PROGRESS NOTES
[facility]    Cardiology Progress Note- Cards 2    Date of Admission:  2/15/2021     Today's Plan  - Awaiting Social work arranging dialysis and placement  - Continue Dobutamine gtt at 3, will likely discontinue prior to discharge  - Oxyhgb of 62, CI : 2.9 and CO : 5.9    Assessment & Plan: S   Eliseo Tanner is a 65 year old male with PMHx relevant for NICM with LVEF 15-20%, NYHA III s/p HM III 2/18/2020 (post op complicated by retrosternal hematoma with bleeding in the lungs), s/p ICD placed on 3/16/2020, h/o MSSA driveline infection and bacteremia 11/5/2020 on prophylactic cephalexin, h/o VT on amiodarone, moderate nonobstructive CAD, severe MR, atrial fibrillation on warfarin, hypertension, ABHINAV requiring CPAP, CKD IV, DMII, HLP,  h/o HIT who presented to the Cardiovascular Unit (4E Cardiac ICU) with mixed cardiogenic and distributive shock withan intermittent wide complex tachycardia. Initially requiring vasopressors and inotropes, intubated for hypoxemia. Treated for legionella pneumonia. Stabilized on low dose dobutamine off of pressors. Extubated. Now on iHD  with improving renal status.      Cardiovascular:  #Mixed Cardiogenic and Septic Shock - Improved  #Multiorgan Failure - Improved  Presented from [facility] with subacute development of shortness of breath, dyspnea on exertion, dizziness/lightheadedness with near syncopal event found to be febrile with intermittent wide complex tachycardia. Recent COVID19 moderna vaccination. CT chest showing bilateral infiltrates. Community acquired pneumonia vs. Possible recurrent of MSSA bacteremia WBC 24.5, Procal 7.95, , Lactacte 6.9. Completed course of IV antibiotics.     Mildly elevated filling pressures on presentation CVP 18, PA 35/20, PCWP 11. Likely some component of cardiogenic shock. LVAD parameters relatively stable despite markedly elevated LDH 8,531. Euvolemic on exam.  Worsening renal function, Cr 4.62, up-trending LFT's/ INR. Legionella antigen positive. Weaned off of vasopressor and maintained on dobutamine. Acute hepatic injury which is improving. Improving renal function with increasing UOP and downtrending creatinine, last but continues to require dialysis. Shock resolved.      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 Bhatti (ID clinic)   # Troponin elevation (likely demand ischemia)  # Essential Hypertension  # Hyperlipidemia  Patient with long standing history of NICM previously implanted LVAD (HM III) on 02/18/20 due to worsening functional status and systolic ventricular dysfunction (further complicated by RV dysfunction by VAD hemodynamic compensatory mechanism, VT in ICU now on Amiodarone and Atrial Fibrillation with AVR requiring DCCV on 02/28/20).      Elevated cardiac biomarkers on presentation, SGC with only mildly elevated filling pressures. Euvolemic on exam. Troponin elevation likely related to demand ischemia with no concerns for ACS. Most recent VAD interrogation without any significant Flow-Alarms     LDH 8,531. Initial concern for LVAD thrombus. However given relatively stable LVAD parameters, degree of LDH elevation is out of proportion. Will continue to monitor for LVAD alarms.      - continue dobutamine for renal recovery- Continue low dose dobutamine @3  - Held ACE-I/ARB/ARNi: Lisinopril; due to acute renal failure  - No BB; deferred 2/2 shock  - Aldosterone antagonist: deferred while other medical therapy is optimized  - SCD prophylaxis: ICD  - Fluid status : fairly euvolemic,  Bumex on non-dialysis days, further management per dialysis  - MAP Goal: 65-90  - On Warfarin for HM-III INR Goal 2-3  - Continue Hydralazine  - Continue ASA 81 mg per oral daily       # Wide complex tachycardia. Atrial Flutter vs Afib vs Ventricular Tachycardia  # History of Atrial Fibrillation s/p DCCV/history of Atrial Flutter   Wide complex tachycardia HR 140s on presentation in the setting of febrile illness and likely pneumonia. Did not initially respond to adenosine. Concern for VT. Intermittently back into HR 60s with underlying rhythm of atrial flutter 3:1 conduction block. Per EP can not rule out VT, may be dual tachycardias. Has been in persistent atrial flutter. S/p CHAMP+DCCV, sucessfully maintaining NSR.       - continue home amiodarone  - Ensure K+ >4.0 mEq/L and Mg+2 >2.0  - continue warfarin  - On cardiac telemetry     Pulmonary  #Legionella Pneumonia - resolved  Hypoxemic in the setting of mixed cardiogenic septic shock requiring emergent intubation. O2 requirements quickly improved. S/p extubation on room air.     Infectious disease     #Sepsis  #Legionella Pneumonia  #Bilateral pulmonary infiltrates  CT showing bilateral diffuse patchy consolidations concerning for infectious process. Low suspicion for recurrent driveline or possible hardware infection (history of MSSA Chronic Driveline Infection). Urine without pyuria. Urine legionella ag positive.   - f/u blood cultures  - continue Abx as per ID              -Cefazolin (2/18/21 - 3/1/21) followed by PO cefalexin              Azithromycin (2/15/21 - 2/25/21)     Renal:  # Acute on Chronic CKD stage IV likely 2/2 Septic Shock   s/p L TDC receiving IHD with improving status suggestive of renal recovery  - IHD per nephrology  - Daily Weight's  - Strict I/O's  - Daily BMP's  - Avoid any additional nephrotoxicity   - intermittent bumetanide on non-dialysis days     GI  # Shock Liver - resolved  # Congestive Hepatopathy - resolved  Hepatoccelular  pattern of liver injury  > 3,496, AST 1,441 > 8,490 likely 2/2 to septic shock. INR downtrended s/p 3mg IV vit K. RUQ US without portal vein thrombus. LFTs downtrended nicely. No evidence of synthetic dysfunction.      Hematological   # Chronic Microcytic/Hypochromic Anemia (likely related to iron deficiency)  # Coagulopathy related to sepsis   # IgG Kappa monoclonal Gammopathy of undetermined significance  - Monitor Hgb (baseline 8-9g/dL)  - transfuse for Hb < 7.0   - Patient follows with [facility] (Dr. Ibarra)     # Previous Concern For HIT  On previous hospitalization for LVAD placement (02/06/20-03/20/20), concern for HIT. 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     At that time, patient underwent two HIT panel tests (first one was negative and second antibody test was positive). No known thrombosis events. DAVINA antibody was negative raising question about validity of diagnosis . Per hematology at the time (Dr. James), no other cause for isolated thrombocytopenia. Immediate recovery of plts following d/c of heparin. 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      - avoiding heparin products  - continue warfarin     Endocrine  # Type II DM     > Last Hgb A1C: 6.8 (01/06/21)  - Continue PTA Insulin Basaglar 10 Units Aq at bedtime  - On Medium sliding scale insulin  - Oral Hypoglycemia Protocol         DVT Prophylaxis: therapeutic warfarin  Guevara Catheter: not present  Code Status: Full Code  Fluids: no IVFs  Lines: [facility] (2/23)    Disposition Plan   Pending establishment of long-term dialysis needs      Entered: Alphonse Love MD 03/11/2021, 7:06 AM       The patient's care was discussed with Dr. Salmeron.    Alphonse Love MD   [facility] [id number]  [facility]  Please see sign in/sign out for up to date  coverage information  ______________________________________________________________________    Interval History   No complaints. No shortness of breath, palpitations, fevers or chills. Continues to have good UOP, with a total of 2.8L yesterday.     Data reviewed today: I reviewed all medications, new labs and imaging results over the last 24 hours.    Physical Exam   Vital Signs: Temp: 98.6  F (37  C) Temp src: Oral BP: (!) 132/99(bc hydralazine given at 5) Pulse: 69   Resp: 16 SpO2: 97 % O2 Device: BiPAP/CPAP    Weight: 194 lbs 4.8 oz   Gen: awake and in NAD  HEENT: gauze in Left nares, RIJ TDC in place. JVP ~8-10 cm  Heart: LVAD hum.   Lungs: Bilateral crackles. No wheezes  GI: Soft, nontender, nondistended.   Extremities: WWP, Trace LE edema  Neuro: grossly non focal  Skin: no rashes.    Data   Recent Labs   Lab 03/11/21  0440 03/10/21  1700 03/10/21  0415 03/09/21  0510 03/09/21  0510   WBC 4.9  --  4.6  --  4.2   HGB 8.6*  --  7.8*  --  8.4*   MCV 85  --  81  --  84     --  201  --  186   INR 2.60*  --  2.80*  --  2.68*    134 134   < > 136   POTASSIUM 3.8 3.4 3.4   < > 3.6   CHLORIDE 99 99 97   < > 101   CO2 28 27 28   < > 27   BUN 42* 28 62*   < > 48*   CR 2.48* 1.97* 3.37*   < > 3.17*   ANIONGAP 6 7 9   < > 8   CALOS 8.5 9.0 8.2*   < > 8.8   * 175* 107*   < > 116*   ALBUMIN 2.9* 3.2* 2.9*   < > 2.9*   PROTTOTAL 7.7 8.1 7.3   < > 7.6   BILITOTAL 0.4 0.5 0.5   < > 0.5   ALKPHOS 124 136 114   < > 124   ALT 31 31 27   < > 26   AST 45 45 41   < > 44    < > = values in this interval not displayed.     No results found for this or any previous visit (from the past 24 hour(s)).  Medications     dextrose       dextrose Stopped (02/17/21 1600)     DOBUTamine 3 mcg/kg/min (03/10/21 2035)     Warfarin Therapy Reminder         allopurinol  100 mg Oral or Feeding Tube Daily     amiodarone  200 mg Oral Daily     aspirin  81 mg Oral Daily     bumetanide  4 mg Oral Daily     cephALEXin  500 mg Oral Q12H  BRITTANI     finasteride  5 mg Oral Daily     FLUoxetine  30 mg Oral Daily     hydrALAZINE  125 mg Oral Q8H BRITTANI     insulin aspart  1-7 Units Subcutaneous TID AC     insulin aspart  1-5 Units Subcutaneous At Bedtime     magnesium oxide  400 mg Oral BID     melatonin  3 mg Oral At Bedtime     multivitamin RENAL  1 capsule Oral Daily     omeprazole  20 mg Oral QAM AC     senna-docusate  2 tablet Oral BID     tamsulosin  0.4 mg Oral Daily

## 2021-03-11 NOTE — PLAN OF CARE
D: Admitted 2/15 with mixed cardiogenic and distributive shock w/ intermittent wide-complex tachycardia c/b hypoxemia requiring intubation, PNA, and BERNARDA now on HD. Hx of NICM s/p HM3 2/2020, MSSA drivline infection and bacteremia, VT s/p ICD, CAD, severe MR, afib, HTN, ABHINAV, CKD, DM2, HIT     I/A: A/Ox4. VSS on RA, home cpap @noc. SR on tele. LVAD #'s WNL, no alarms, daily dressing CDI. MAPs elevated this am, bc hydralazine given early. PRN Ambien @HS. Denies pain. Dobutamine gtt @3mcg/kg/min via R PICC, PCU collect. Tunneled dialysis CVC CDI. Tolerating 2gNa diet with good appetite, BS ACHS. Voiding well, on HD. Up ad tiffanie. Appeared to sleep comfortably overnight.    P: SW arranging dialysis and placement. Nephro following, evaluating for possible HD holiday. Encourage therapies. Continue to monitor and notify Cards 2 with changes/concerns.

## 2021-03-11 NOTE — PROGRESS NOTES
Met with patient and patient's wife in hospital room. Reviewed modality options at bedside. Reviewed the following items:  PD vs HD  Medicare guidelines for diagnosis and billing/payment  Training for home therapy  Travel for training, travel while on home therapy  Supplies and use of equipment  Home needs in relation to electric, waste, water  Emergency planning  [employer] adult training modules used and given to patient for review. Patient will return to dialysis unit when done with review of lessons.  Call placed to dialysis care coordinator at [facility] to help with coordination.  Notified nephrology team currently following patient. Family has requested a care conference for teams to review planning (pulmonary,renal,cardiac, social work).  Family would like to reduce hospital time and travel time to units once home. If patient will be ESRD rather than BERNARDA, patient would like a surgery consult to discuss placement of PD catheter and urgent start to take advantage of the time here in the hospital and work toward discharge.  Currently have been told that they are looking at a discharge no earlier than first part of April due to [place] dialysis unit needing training on LVAD in the hemodialysis unit.  If patient is established with home care, the LVAD would no longer be an issue as it is managed by the family and LVAD coordinator is always available by pager.   Family has PD nurse contact information and will reach out with additional questions or concerns.

## 2021-03-11 NOTE — PLAN OF CARE
VSS on RA. Slightly hypertensive MAPs high 90s, low 100s. Lvad number wnl. Denies pain. Doutamine gtt infusing 3mcg/kg/min. PD dialysis nurse educated patient today, wife bedside. Will continue POC. PCU collect, VBG collected via picc [phone].

## 2021-03-12 ENCOUNTER — APPOINTMENT (OUTPATIENT)
Dept: OCCUPATIONAL THERAPY | Facility: CLINIC | Age: 66
DRG: 870 | End: 2021-03-12
Attending: INTERNAL MEDICINE
Payer: MEDICARE

## 2021-03-12 LAB
ALBUMIN SERPL-MCNC: 2.9 G/DL (ref 3.4–5)
ALBUMIN SERPL-MCNC: 3.2 G/DL (ref 3.4–5)
ALP SERPL-CCNC: 114 U/L (ref 40–150)
ALP SERPL-CCNC: 123 U/L (ref 40–150)
ALT SERPL W P-5'-P-CCNC: 28 U/L (ref 0–70)
ALT SERPL W P-5'-P-CCNC: 32 U/L (ref 0–70)
ANION GAP SERPL CALCULATED.3IONS-SCNC: 7 MMOL/L (ref 3–14)
ANION GAP SERPL CALCULATED.3IONS-SCNC: 8 MMOL/L (ref 3–14)
APTT PPP: 37 SEC (ref 22–37)
AST SERPL W P-5'-P-CCNC: 44 U/L (ref 0–45)
AST SERPL W P-5'-P-CCNC: 45 U/L (ref 0–45)
BILIRUB SERPL-MCNC: 0.4 MG/DL (ref 0.2–1.3)
BILIRUB SERPL-MCNC: 0.5 MG/DL (ref 0.2–1.3)
BUN SERPL-MCNC: 58 MG/DL (ref 7–30)
BUN SERPL-MCNC: 62 MG/DL (ref 7–30)
CALCIUM SERPL-MCNC: 8.2 MG/DL (ref 8.5–10.1)
CALCIUM SERPL-MCNC: 8.4 MG/DL (ref 8.5–10.1)
CHLORIDE SERPL-SCNC: 100 MMOL/L (ref 94–109)
CHLORIDE SERPL-SCNC: 96 MMOL/L (ref 94–109)
CO2 SERPL-SCNC: 27 MMOL/L (ref 20–32)
CO2 SERPL-SCNC: 29 MMOL/L (ref 20–32)
CREAT SERPL-MCNC: 2.65 MG/DL (ref 0.66–1.25)
CREAT SERPL-MCNC: 2.76 MG/DL (ref 0.66–1.25)
ERYTHROCYTE [DISTWIDTH] IN BLOOD BY AUTOMATED COUNT: 21.9 % (ref 10–15)
ERYTHROCYTE [DISTWIDTH] IN BLOOD BY AUTOMATED COUNT: NORMAL % (ref 10–15)
GFR SERPL CREATININE-BSD FRML MDRD: 23 ML/MIN/{1.73_M2}
GFR SERPL CREATININE-BSD FRML MDRD: 24 ML/MIN/{1.73_M2}
GLUCOSE BLDC GLUCOMTR-MCNC: 214 MG/DL (ref 70–99)
GLUCOSE BLDC GLUCOMTR-MCNC: 92 MG/DL (ref 70–99)
GLUCOSE BLDC GLUCOMTR-MCNC: 94 MG/DL (ref 70–99)
GLUCOSE SERPL-MCNC: 105 MG/DL (ref 70–99)
GLUCOSE SERPL-MCNC: 87 MG/DL (ref 70–99)
HCT VFR BLD AUTO: 26 % (ref 40–53)
HCT VFR BLD AUTO: NORMAL % (ref 40–53)
HGB BLD-MCNC: 8.1 G/DL (ref 13.3–17.7)
HGB BLD-MCNC: NORMAL G/DL (ref 13.3–17.7)
INR PPP: 2.59 (ref 0.86–1.14)
LDH SERPL L TO P-CCNC: 285 U/L (ref 85–227)
MAGNESIUM SERPL-MCNC: 1.6 MG/DL (ref 1.6–2.3)
MCH RBC QN AUTO: 25.2 PG (ref 26.5–33)
MCH RBC QN AUTO: NORMAL PG (ref 26.5–33)
MCHC RBC AUTO-ENTMCNC: 31.2 G/DL (ref 31.5–36.5)
MCHC RBC AUTO-ENTMCNC: NORMAL G/DL (ref 31.5–36.5)
MCV RBC AUTO: 81 FL (ref 78–100)
MCV RBC AUTO: NORMAL FL (ref 78–100)
PLATELET # BLD AUTO: 224 10E9/L (ref 150–450)
PLATELET # BLD AUTO: NORMAL 10E9/L (ref 150–450)
POTASSIUM SERPL-SCNC: 3.2 MMOL/L (ref 3.4–5.3)
POTASSIUM SERPL-SCNC: 3.5 MMOL/L (ref 3.4–5.3)
PROT SERPL-MCNC: 7.3 G/DL (ref 6.8–8.8)
PROT SERPL-MCNC: 7.9 G/DL (ref 6.8–8.8)
RBC # BLD AUTO: 3.22 10E12/L (ref 4.4–5.9)
RBC # BLD AUTO: NORMAL 10E12/L (ref 4.4–5.9)
SODIUM SERPL-SCNC: 132 MMOL/L (ref 133–144)
SODIUM SERPL-SCNC: 136 MMOL/L (ref 133–144)
WBC # BLD AUTO: 5.2 10E9/L (ref 4–11)
WBC # BLD AUTO: NORMAL 10E9/L (ref 4–11)

## 2021-03-12 PROCEDURE — 999N000044 HC STATISTIC CVC DRESSING CHANGE

## 2021-03-12 PROCEDURE — 85027 COMPLETE CBC AUTOMATED: CPT | Performed by: INTERNAL MEDICINE

## 2021-03-12 PROCEDURE — 250N000013 HC RX MED GY IP 250 OP 250 PS 637: Performed by: INTERNAL MEDICINE

## 2021-03-12 PROCEDURE — 250N000013 HC RX MED GY IP 250 OP 250 PS 637: Performed by: STUDENT IN AN ORGANIZED HEALTH CARE EDUCATION/TRAINING PROGRAM

## 2021-03-12 PROCEDURE — 97535 SELF CARE MNGMENT TRAINING: CPT | Mod: GO | Performed by: OCCUPATIONAL THERAPIST

## 2021-03-12 PROCEDURE — 85610 PROTHROMBIN TIME: CPT | Performed by: INTERNAL MEDICINE

## 2021-03-12 PROCEDURE — 99233 SBSQ HOSP IP/OBS HIGH 50: CPT | Mod: GC | Performed by: INTERNAL MEDICINE

## 2021-03-12 PROCEDURE — 999N001017 HC STATISTIC GLUCOSE BY METER IP

## 2021-03-12 PROCEDURE — 83615 LACTATE (LD) (LDH) ENZYME: CPT | Performed by: INTERNAL MEDICINE

## 2021-03-12 PROCEDURE — 97110 THERAPEUTIC EXERCISES: CPT | Mod: GO | Performed by: OCCUPATIONAL THERAPIST

## 2021-03-12 PROCEDURE — 80053 COMPREHEN METABOLIC PANEL: CPT | Performed by: INTERNAL MEDICINE

## 2021-03-12 PROCEDURE — 214N000001 HC R&B CCU UMMC

## 2021-03-12 PROCEDURE — 99231 SBSQ HOSP IP/OBS SF/LOW 25: CPT | Mod: GC | Performed by: INTERNAL MEDICINE

## 2021-03-12 PROCEDURE — 85730 THROMBOPLASTIN TIME PARTIAL: CPT | Performed by: INTERNAL MEDICINE

## 2021-03-12 PROCEDURE — 83735 ASSAY OF MAGNESIUM: CPT | Performed by: INTERNAL MEDICINE

## 2021-03-12 RX ORDER — POTASSIUM CHLORIDE 750 MG/1
20 TABLET, EXTENDED RELEASE ORAL ONCE
Status: COMPLETED | OUTPATIENT
Start: 2021-03-12 | End: 2021-03-12

## 2021-03-12 RX ADMIN — DOCUSATE SODIUM 50 MG AND SENNOSIDES 8.6 MG 2 TABLET: 8.6; 5 TABLET, FILM COATED ORAL at 07:39

## 2021-03-12 RX ADMIN — TAMSULOSIN HYDROCHLORIDE 0.4 MG: 0.4 CAPSULE ORAL at 07:39

## 2021-03-12 RX ADMIN — OMEPRAZOLE 20 MG: 20 CAPSULE, DELAYED RELEASE ORAL at 07:39

## 2021-03-12 RX ADMIN — AMIODARONE HYDROCHLORIDE 200 MG: 200 TABLET ORAL at 07:39

## 2021-03-12 RX ADMIN — CEPHALEXIN 500 MG: 500 CAPSULE ORAL at 20:04

## 2021-03-12 RX ADMIN — FINASTERIDE 5 MG: 5 TABLET, FILM COATED ORAL at 07:38

## 2021-03-12 RX ADMIN — CEPHALEXIN 500 MG: 500 CAPSULE ORAL at 07:39

## 2021-03-12 RX ADMIN — Medication 1 CAPSULE: at 07:38

## 2021-03-12 RX ADMIN — BUMETANIDE 4 MG: 2 TABLET ORAL at 07:38

## 2021-03-12 RX ADMIN — POTASSIUM CHLORIDE 20 MEQ: 750 TABLET, EXTENDED RELEASE ORAL at 20:04

## 2021-03-12 RX ADMIN — HYDRALAZINE HYDROCHLORIDE 125 MG: 100 TABLET, FILM COATED ORAL at 20:33

## 2021-03-12 RX ADMIN — ALLOPURINOL 100 MG: 100 TABLET ORAL at 07:38

## 2021-03-12 RX ADMIN — FLUOXETINE 30 MG: 20 CAPSULE ORAL at 07:39

## 2021-03-12 RX ADMIN — MAGNESIUM OXIDE 400 MG: 400 TABLET ORAL at 07:39

## 2021-03-12 RX ADMIN — ASPIRIN 81 MG CHEWABLE TABLET 81 MG: 81 TABLET CHEWABLE at 07:39

## 2021-03-12 RX ADMIN — MAGNESIUM OXIDE 400 MG: 400 TABLET ORAL at 20:04

## 2021-03-12 RX ADMIN — HYDRALAZINE HYDROCHLORIDE 125 MG: 100 TABLET, FILM COATED ORAL at 05:30

## 2021-03-12 RX ADMIN — HYDRALAZINE HYDROCHLORIDE 125 MG: 100 TABLET, FILM COATED ORAL at 12:51

## 2021-03-12 RX ADMIN — WARFARIN SODIUM 3.5 MG: 1 TABLET ORAL at 17:45

## 2021-03-12 RX ADMIN — ZOLPIDEM TARTRATE 5 MG: 5 TABLET, FILM COATED ORAL at 20:34

## 2021-03-12 ASSESSMENT — ACTIVITIES OF DAILY LIVING (ADL)
ADLS_ACUITY_SCORE: 12

## 2021-03-12 NOTE — PROGRESS NOTES
[facility]      Faculty Attestation  Gucci Tomas M.D.    I personally saw and examined this patient, reviewed recent laboratories and imaging studies, discussed the case with the housestaff, and conveyed plan to the patient.  I answered all questions from patient and/or family. I agree with the examination, assessment and plan outlined here.          Cardiology Progress Note- Cards 2    Date of Admission:  2/15/2021     Today's Plan  - Awaiting Social work arranging dialysis and placement  - Continue Dobutamine gtt at 3, will likely discontinue prior to discharge    Assessment & Plan: [facility]   Eliseo Tanner is a 65 year old male with PMHx relevant for NICM with LVEF 15-20%, NYHA III s/p HM III 2/18/2020 (post op complicated by retrosternal hematoma with bleeding in the lungs), s/p ICD placed on 3/16/2020, h/o MSSA driveline infection and bacteremia 11/5/2020 on prophylactic cephalexin, h/o VT on amiodarone, moderate nonobstructive CAD, severe MR, atrial fibrillation on warfarin, hypertension, ABHINAV requiring CPAP, CKD IV, DMII, HLP,  h/o HIT who presented to the Cardiovascular Unit (4E Cardiac ICU) with mixed cardiogenic and distributive shock withan intermittent wide complex tachycardia. Initially requiring vasopressors and inotropes, intubated for hypoxemia. Treated for legionella pneumonia. Stabilized on low dose dobutamine off of pressors. Extubated. Now on iHD with improving renal status.      Cardiovascular:  Mixed Cardiogenic and Septic Shock - Improved  Multiorgan Failure - Improved  Presented from [facility] with subacute development of shortness of breath, dyspnea on exertion, dizziness/lightheadedness with near syncopal event found to be febrile with intermittent wide complex tachycardia. Recent COVID19 moderna vaccination. CT chest showing bilateral infiltrates. Community acquired pneumonia vs. Possible recurrent of MSSA  bacteremia WBC 24.5, Procal 7.95, , Lactacte 6.9. Completed course of IV antibiotics.     Mildly elevated filling pressures on presentation CVP 18, PA 35/20, PCWP 11. Likely some component of cardiogenic shock. LVAD parameters relatively stable despite markedly elevated LDH 8,531. Euvolemic on exam. Worsening renal function, Cr 4.62, up-trending LFT's/ INR. Legionella antigen positive. Weaned off of vasopressor and maintained on dobutamine. Acute hepatic injury which is improving. Improving renal function with increasing UOP and downtrending creatinine, last but continues to require dialysis. Shock resolved.      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 Bhatti (ID clinic)   Troponin elevation (likely demand ischemia)  Essential Hypertension  Hyperlipidemia  Patient with long standing history of NICM previously implanted LVAD (HM III) on 02/18/20 due to worsening functional status and systolic ventricular dysfunction (further complicated by RV dysfunction by VAD hemodynamic compensatory mechanism, VT in ICU now on Amiodarone and Atrial Fibrillation with AVR requiring DCCV on 02/28/20).      Elevated cardiac biomarkers on presentation, SGC with only mildly elevated filling pressures. Euvolemic on exam. Troponin elevation likely related to demand ischemia with no concerns for ACS. Most recent VAD interrogation without any significant Flow-Alarms     LDH 8,531. Initial concern for LVAD thrombus. However given relatively stable LVAD parameters, degree of LDH elevation is out of proportion. Will continue to monitor for LVAD alarms.      - continue  dobutamine for renal recovery- Continue low dose dobutamine @3  - Held ACE-I/ARB/ARNi: Lisinopril; due to acute renal failure  - No BB; deferred 2/2 shock  - Aldosterone antagonist: deferred while other medical therapy is optimized  - SCD prophylaxis: ICD  - Fluid status : fairly euvolemic, Bumex on non-dialysis days, further management per dialysis  - MAP Goal: 65-90  - On Warfarin for HM-III INR Goal 2-3  - Continue Hydralazine  - Continue ASA 81 mg per oral daily       Wide complex tachycardia. Atrial Flutter vs Afib vs Ventricular Tachycardia  History of Atrial Fibrillation s/p DCCV/history of Atrial Flutter   Wide complex tachycardia HR 140s on presentation in the setting of febrile illness and likely pneumonia. Did not initially respond to adenosine. Concern for VT. Intermittently back into HR 60s with underlying rhythm of atrial flutter 3:1 conduction block. Per EP can not rule out VT, may be dual tachycardias. Has been in persistent atrial flutter. S/p CHAMP+DCCV, sucessfully maintaining NSR.       - continue home amiodarone  - Ensure K+ >4.0 mEq/L and Mg+2 >2.0  - continue warfarin  - On cardiac telemetry     Pulmonary  Legionella Pneumonia - resolved  Hypoxemic in the setting of mixed cardiogenic septic shock requiring emergent intubation. O2 requirements quickly improved. S/p extubation on room air.     Infectious disease     Sepsis  Legionella Pneumonia  Bilateral pulmonary infiltrates  CT showing bilateral diffuse patchy consolidations concerning for infectious process. Low suspicion for recurrent driveline or possible hardware infection (history of MSSA Chronic Driveline Infection). Urine without pyuria. Urine legionella ag positive.   - f/u blood cultures  - continue Abx as per ID              -Cefazolin (2/18/21 - 3/1/21) followed by PO cefalexin              Azithromycin (2/15/21 - 2/25/21)     Renal:  Acute on Chronic CKD stage IV likely 2/2 Septic Shock   s/p L TDC receiving IHD with improving  status suggestive of renal recovery  - IHD per nephrology  - Daily Weight's  - Strict I/O's  - Daily BMP's  - Avoid any additional nephrotoxicity   - intermittent bumetanide on non-dialysis days     GI  # Shock Liver - resolved  # Congestive Hepatopathy - resolved  Hepatoccelular pattern of liver injury  > 3,496, AST 1,441 > 8,490 likely 2/2 to septic shock. INR downtrended s/p 3mg IV vit K. RUQ US without portal vein thrombus. LFTs downtrended nicely. No evidence of synthetic dysfunction.      Hematological   Chronic Microcytic/Hypochromic Anemia (likely related to iron deficiency)  Coagulopathy related to sepsis   IgG Kappa monoclonal Gammopathy of undetermined significance  - Monitor Hgb (baseline 8-9g/dL)  - transfuse for Hb < 7.0   - Patient follows with [facility] (Dr. Ibarra)     Previous Concern For HIT  On previous hospitalization for LVAD placement (02/06/20-03/20/20), concern for HIT. Platelets 250K --> ~ 90K wuth documented history of exposure to unfractionate heparin products at OSH prior to his installation at the [facility] [employer].     At that time, patient underwent two HIT panel tests (first one was negative and second antibody test was positive). No known thrombosis events. DAVINA antibody was negative raising question about validity of diagnosis . Per hematology at the time (Dr. James), no other cause for isolated thrombocytopenia. Immediate recovery of plts following d/c of heparin. 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      - avoiding heparin products  - continue warfarin     Endocrine  Type II DM     > Last Hgb A1C: 6.8 (01/06/21)  - Continue PTA Insulin Basaglar 10 Units Aq at bedtime  - On Medium sliding scale insulin  - Oral Hypoglycemia Protocol         DVT Prophylaxis: therapeutic warfarin  Guevara Catheter: not present  Code Status: Full Code  Fluids: no IVFs  Lines: MICHELINE TD (2/23)    Disposition Plan   Pending establishment of long-term dialysis  needs      Entered: Estela Phan MD 03/12/2021, 6:52 AM       The patient's care was discussed with Dr. Tomas.    Estela Phan MD   PGY-1 internal medicine resident  [phone]  [facility]  Please see sign in/sign out for up to date coverage information  ______________________________________________________________________    Interval History   Nursing notes reviewed. No acute events overnight. No complaints. No shortness of breath, palpitations, fevers or chills. Continues to have good UOP, with a total of 1.3L yesterday.     Data reviewed today: I reviewed all medications, new labs and imaging results over the last 24 hours.    Physical Exam   Vital Signs: Temp: 97.7  F (36.5  C) Temp src: Axillary BP: (!) 118/95 Pulse: 70   Resp: 16 SpO2: 97 % O2 Device: BiPAP/CPAP    Weight: 194 lbs 0 oz   Gen: awake and in NAD  Neck: JVP ~8-10 cm  Heart: LVAD hum.   Lungs: mild bilateral crackles. No wheezes  GI: Soft, nontender, nondistended.   Extremities: WWP, Trace LE edema  Neuro: grossly non focal  Skin: no rashes.    Data   Recent Labs   Lab 03/12/21  0610 03/12/21  0537 03/11/21  1650 03/11/21  0440 03/10/21  0415 03/10/21  0415   WBC 5.2 Canceled, Test credited  --  4.9  --  4.6   HGB 8.1* Canceled, Test credited  --  8.6*  --  7.8*   MCV 81 Canceled, Test credited  --  85  --  81    Canceled, Test credited  --  219  --  201   INR  --  2.59*  --  2.60*  --  2.80*   NA  --  136 131* 133   < > 134   POTASSIUM  --  3.5 3.3* 3.8   < > 3.4   CHLORIDE  --  100 95 99   < > 97   CO2  --  27 29 28   < > 28   BUN  --  58* 48* 42*   < > 62*   CR  --  2.76* 2.60* 2.48*   < > 3.37*   ANIONGAP  --  8 7 6   < > 9   CALOS  --  8.2* 8.4* 8.5   < > 8.2*   GLC  --  105* 120* 111*   < > 107*   ALBUMIN  --  2.9* 3.3* 2.9*   < > 2.9*   PROTTOTAL  --  7.3 8.1 7.7   < > 7.3   BILITOTAL  --  0.4 0.5 0.4   < > 0.5   ALKPHOS  --  114 133 124   < > 114   ALT  --  28 34 31   < > 27   AST   --  44 44 45   < > 41    < > = values in this interval not displayed.     No results found for this or any previous visit (from the past 24 hour(s)).  Medications     dextrose       dextrose Stopped (02/17/21 1600)     DOBUTamine 3 mcg/kg/min (03/12/21 0000)     Warfarin Therapy Reminder         allopurinol  100 mg Oral or Feeding Tube Daily     amiodarone  200 mg Oral Daily     aspirin  81 mg Oral Daily     bumetanide  4 mg Oral Daily     cephALEXin  500 mg Oral Q12H BRITTANI     finasteride  5 mg Oral Daily     FLUoxetine  30 mg Oral Daily     hydrALAZINE  125 mg Oral Q8H BRITTANI     insulin aspart  1-7 Units Subcutaneous TID AC     insulin aspart  1-5 Units Subcutaneous At Bedtime     magnesium oxide  400 mg Oral BID     melatonin  3 mg Oral At Bedtime     multivitamin RENAL  1 capsule Oral Daily     omeprazole  20 mg Oral QAM AC     senna-docusate  2 tablet Oral BID     tamsulosin  0.4 mg Oral Daily

## 2021-03-12 NOTE — PLAN OF CARE
Pt with HF, HM 3 LVAD, continues on a dobutamine drip at 3 mcgs/kg/min. SR vs paced 70s, VS and LVAD #s WNL. PCU collect. Last K+3.3, replaced with 20 meq KCL. (Pt on dialysis) Up in the recliner most of the shift. A&O, up ad tiffanie in the room.

## 2021-03-12 NOTE — PROGRESS NOTES
"SPIRITUAL HEALTH SERVICES  SPIRITUAL ASSESSMENT Progress Note  [facility] ([place]) 6C     Follow-up visit with pt and spouse. Pt said he doesn't expect to discharge until next week. Pt coping well with extended hospitalization, but said \"I'm just a little bored...\" Prayer was welcomed.    PLAN: continue to follow.    Ad Mccoy) Hernandez Krause M.Div., [facility]  Staff   Pager [phone]      * [facility] remains available 24/7 for emergent requests/referrals, either by having the switchboard page the on-call  or by entering an ASAP/STAT consult in Epic (this will also page the on-call ). Routine Epic consults receive an initial response within 24 hours.*      "

## 2021-03-12 NOTE — PROGRESS NOTES
Nephrology Progress Note  03/12/2021         Assessment & Recommendations:   64 yo M with PMHx  NICM  s/p HM III , VT, severe MR, atrial fibrillation on warfarin, hypertension, CKD IV, DMII, HIT presented to OSH with fevers and cough in the setting of syncopal episode transferred to [facility] for further cares. Hospitalization complicated by Afib with RVR with hypotension and intubation with upgrade of cares to the ICU. Found to be in septic shock 2/2 legionella pneumonia with possible cardiogenic shock. Nephrology was consulted for evaluation and management of anuric BERNARDA. Started on RRT 2/16 and continued to be on dialysis, but now making good amount of urine on Bumex.      Dialysis dependent non-oligouric BERNARDA 2/2 ischemic ATN  Baseline Cr last yr s/p LVAD placement was around 1. On admission ~2.3 and doubled within 24 hours with rapid decrease in UOP. UA with baseline proteinuria and new granular casts. Initiated on RRT 2/16. Pt remained on dialysis so far, but now making good amount of urine on Bumex, will assess for renal recovery.  - Assess for any renal recovery every day  - no acute indication for HD today based on labs or exam  - Continue bumex 4 mg BID  - Avoid nephrotoxins as able  - Renally dose meds  - Monitor I/Os     Volume status  Euvolemic  - Daily weights, Is/Os  - Diuretics as above     Electrolytes   Mild Hyponatremia - CTM     Acid/base - stable   Anemia - Hb 8.2, stable. Will provide iron with next HD session     Recommendations were communicated to primary team via this note     Seen and discussed with Dr. Ramona Mabry MD   [phone]    Interval History :   Nursing and provider notes from last 24 hours reviewed.  In the last 24 hours Eliseo Tanner remained stable. Denied any symptoms today.    Physical Exam:   I/O last 3 completed shifts:  In: 1436 [P.O.:1200; I.V.:236]  Out: 3280 [Urine:3280]   BP (!) 117/92 (BP Location: Left arm)   Pulse 80   Temp 98.2  F (36.8  C)  (Oral)   Resp 16   Wt 88 kg (194 lb)   SpO2 97%   BMI 30.38 kg/m       General : Pt awake, not in acute distress   Lungs : anterior lung fields are clear  Cardiac : S1, S2 present  Abdomen : Soft/ND/NT  LE : Edema noted  Dialysis Access : right perm cath    Labs:   All labs reviewed by me  Electrolytes/Renal -   Recent Labs   Lab Test 03/12/21 0537 03/11/21 1650 03/11/21 0440 03/10/21  0415 03/10/21  0415 02/22/21  1539 02/22/21  1539 02/22/21  0353 02/22/21  0353 02/21/21  1617 02/21/21  1617    131* 133   < > 134   < >  --    < > 136   < >  --    POTASSIUM 3.5 3.3* 3.8   < > 3.4   < >  --    < > 4.7   < >  --    CHLORIDE 100 95 99   < > 97   < >  --    < > 106   < >  --    CO2 27 29 28   < > 28   < >  --    < > 24   < >  --    BUN 58* 48* 42*   < > 62*   < >  --    < > 30   < >  --    CR 2.76* 2.60* 2.48*   < > 3.37*   < >  --    < > 1.94*   < >  --    * 120* 111*   < > 107*   < >  --    < > 109*   < >  --    CALOS 8.2* 8.4* 8.5   < > 8.2*   < >  --    < > 8.1*   < >  --    MAG 1.6  --  1.5*  --  1.7   < > 2.6*  --  2.5*  --  2.6*   PHOS  --   --   --   --   --   --  5.2*  --  4.4  --  4.5    < > = values in this interval not displayed.       CBC -   Recent Labs   Lab Test 03/12/21  0610 03/12/21  0537 03/11/21  0440   WBC 5.2 Canceled, Test credited 4.9   HGB 8.1* Canceled, Test credited 8.6*    Canceled, Test credited 219       LFTs -   Recent Labs   Lab Test 03/12/21 0537 03/11/21 1650 03/11/21  0440   ALKPHOS 114 133 124   BILITOTAL 0.4 0.5 0.4   ALT 28 34 31   AST 44 44 45   PROTTOTAL 7.3 8.1 7.7   ALBUMIN 2.9* 3.3* 2.9*       Iron Panel -   Recent Labs   Lab Test 02/27/21  0415 11/16/20  0616 02/08/20  0408   IRON 28* 16* 25*   IRONSAT 9* 6* 8*   HEAVENLY 276 75 189     Current Medications:    allopurinol  100 mg Oral or Feeding Tube Daily     amiodarone  200 mg Oral Daily     aspirin  81 mg Oral Daily     bumetanide  4 mg Oral Daily     cephALEXin  500 mg Oral Q12H [facility]     finasteride   5 mg Oral Daily     FLUoxetine  30 mg Oral Daily     hydrALAZINE  125 mg Oral Q8H BRITTANI     insulin aspart  1-7 Units Subcutaneous TID AC     insulin aspart  1-5 Units Subcutaneous At Bedtime     magnesium oxide  400 mg Oral BID     melatonin  3 mg Oral At Bedtime     multivitamin RENAL  1 capsule Oral Daily     omeprazole  20 mg Oral QAM AC     senna-docusate  2 tablet Oral BID     tamsulosin  0.4 mg Oral Daily     warfarin ANTICOAGULANT  3.5 mg Oral ONCE at 18:00       dextrose       dextrose Stopped (02/17/21 1600)     DOBUTamine 3 mcg/kg/min (03/12/21 0000)     Warfarin Therapy Reminder       Pao Mabry MD

## 2021-03-12 NOTE — PLAN OF CARE
D: Admitted 2/15 with mixed cardiogenic and distributive shock w/ intermittent wide-complex tachycardia c/b hypoxemia requiring intubation, PNA, and BERNARDA now on HD. Hx of NICM s/p HM3 2/2020, MSSA drivline infection and bacteremia, VT s/p ICD, CAD, severe MR, afib, HTN, ABHINAV, CKD, DM2, HIT     I/A: A/Ox4. VSS on RA, home cpap @noc. SR on tele. LVAD #'s WNL, no alarms, daily dressing CDI. Denies pain. Dobutamine gtt @3mcg/kg/min via R PICC, PCU collect. Tunneled dialysis CVC CDI. Tolerating 2gNa diet with good appetite, BS ACHS. Voiding well, on HD. Up ad tiffanie. Appeared to sleep comfortably overnight.    P: SW arranging dialysis and placement. Nephro following, HD holiday per pt. Encourage therapies. Continue to monitor and notify Cards 2 with changes/concerns.

## 2021-03-13 ENCOUNTER — APPOINTMENT (OUTPATIENT)
Dept: OCCUPATIONAL THERAPY | Facility: CLINIC | Age: 66
DRG: 870 | End: 2021-03-13
Attending: INTERNAL MEDICINE
Payer: MEDICARE

## 2021-03-13 LAB
ALBUMIN SERPL-MCNC: 2.9 G/DL (ref 3.4–5)
ALBUMIN SERPL-MCNC: 3.3 G/DL (ref 3.4–5)
ALP SERPL-CCNC: 120 U/L (ref 40–150)
ALP SERPL-CCNC: 134 U/L (ref 40–150)
ALT SERPL W P-5'-P-CCNC: 29 U/L (ref 0–70)
ALT SERPL W P-5'-P-CCNC: 34 U/L (ref 0–70)
ANION GAP SERPL CALCULATED.3IONS-SCNC: 8 MMOL/L (ref 3–14)
ANION GAP SERPL CALCULATED.3IONS-SCNC: 8 MMOL/L (ref 3–14)
APTT PPP: 39 SEC (ref 22–37)
AST SERPL W P-5'-P-CCNC: 35 U/L (ref 0–45)
AST SERPL W P-5'-P-CCNC: 45 U/L (ref 0–45)
BILIRUB SERPL-MCNC: 0.4 MG/DL (ref 0.2–1.3)
BILIRUB SERPL-MCNC: 0.4 MG/DL (ref 0.2–1.3)
BUN SERPL-MCNC: 60 MG/DL (ref 7–30)
BUN SERPL-MCNC: 64 MG/DL (ref 7–30)
CALCIUM SERPL-MCNC: 8 MG/DL (ref 8.5–10.1)
CALCIUM SERPL-MCNC: 8.3 MG/DL (ref 8.5–10.1)
CHLORIDE SERPL-SCNC: 100 MMOL/L (ref 94–109)
CHLORIDE SERPL-SCNC: 97 MMOL/L (ref 94–109)
CO2 SERPL-SCNC: 29 MMOL/L (ref 20–32)
CO2 SERPL-SCNC: 29 MMOL/L (ref 20–32)
CREAT SERPL-MCNC: 2.45 MG/DL (ref 0.66–1.25)
CREAT SERPL-MCNC: 2.64 MG/DL (ref 0.66–1.25)
ERYTHROCYTE [DISTWIDTH] IN BLOOD BY AUTOMATED COUNT: 22.3 % (ref 10–15)
GFR SERPL CREATININE-BSD FRML MDRD: 24 ML/MIN/{1.73_M2}
GFR SERPL CREATININE-BSD FRML MDRD: 26 ML/MIN/{1.73_M2}
GLUCOSE BLDC GLUCOMTR-MCNC: 109 MG/DL (ref 70–99)
GLUCOSE BLDC GLUCOMTR-MCNC: 127 MG/DL (ref 70–99)
GLUCOSE BLDC GLUCOMTR-MCNC: 145 MG/DL (ref 70–99)
GLUCOSE BLDC GLUCOMTR-MCNC: 175 MG/DL (ref 70–99)
GLUCOSE SERPL-MCNC: 101 MG/DL (ref 70–99)
GLUCOSE SERPL-MCNC: 96 MG/DL (ref 70–99)
HCT VFR BLD AUTO: 27.4 % (ref 40–53)
HGB BLD-MCNC: 8.4 G/DL (ref 13.3–17.7)
INR PPP: 2.45 (ref 0.86–1.14)
LDH SERPL L TO P-CCNC: 297 U/L (ref 85–227)
MAGNESIUM SERPL-MCNC: 1.6 MG/DL (ref 1.6–2.3)
MCH RBC QN AUTO: 25.7 PG (ref 26.5–33)
MCHC RBC AUTO-ENTMCNC: 30.7 G/DL (ref 31.5–36.5)
MCV RBC AUTO: 84 FL (ref 78–100)
PLATELET # BLD AUTO: 257 10E9/L (ref 150–450)
POTASSIUM SERPL-SCNC: 3.3 MMOL/L (ref 3.4–5.3)
POTASSIUM SERPL-SCNC: 3.4 MMOL/L (ref 3.4–5.3)
PROT SERPL-MCNC: 7.4 G/DL (ref 6.8–8.8)
PROT SERPL-MCNC: 8.3 G/DL (ref 6.8–8.8)
RBC # BLD AUTO: 3.27 10E12/L (ref 4.4–5.9)
SODIUM SERPL-SCNC: 134 MMOL/L (ref 133–144)
SODIUM SERPL-SCNC: 136 MMOL/L (ref 133–144)
WBC # BLD AUTO: 5.8 10E9/L (ref 4–11)

## 2021-03-13 PROCEDURE — 85730 THROMBOPLASTIN TIME PARTIAL: CPT | Performed by: INTERNAL MEDICINE

## 2021-03-13 PROCEDURE — 250N000013 HC RX MED GY IP 250 OP 250 PS 637: Performed by: INTERNAL MEDICINE

## 2021-03-13 PROCEDURE — 99232 SBSQ HOSP IP/OBS MODERATE 35: CPT | Mod: GC | Performed by: INTERNAL MEDICINE

## 2021-03-13 PROCEDURE — 83735 ASSAY OF MAGNESIUM: CPT | Performed by: INTERNAL MEDICINE

## 2021-03-13 PROCEDURE — 250N000011 HC RX IP 250 OP 636: Performed by: STUDENT IN AN ORGANIZED HEALTH CARE EDUCATION/TRAINING PROGRAM

## 2021-03-13 PROCEDURE — 250N000013 HC RX MED GY IP 250 OP 250 PS 637: Performed by: STUDENT IN AN ORGANIZED HEALTH CARE EDUCATION/TRAINING PROGRAM

## 2021-03-13 PROCEDURE — 83615 LACTATE (LD) (LDH) ENZYME: CPT | Performed by: INTERNAL MEDICINE

## 2021-03-13 PROCEDURE — 85027 COMPLETE CBC AUTOMATED: CPT | Performed by: INTERNAL MEDICINE

## 2021-03-13 PROCEDURE — 214N000001 HC R&B CCU UMMC

## 2021-03-13 PROCEDURE — 36592 COLLECT BLOOD FROM PICC: CPT | Performed by: INTERNAL MEDICINE

## 2021-03-13 PROCEDURE — 97110 THERAPEUTIC EXERCISES: CPT | Mod: GO | Performed by: OCCUPATIONAL THERAPIST

## 2021-03-13 PROCEDURE — 85610 PROTHROMBIN TIME: CPT | Performed by: INTERNAL MEDICINE

## 2021-03-13 PROCEDURE — 250N000013 HC RX MED GY IP 250 OP 250 PS 637

## 2021-03-13 PROCEDURE — 80053 COMPREHEN METABOLIC PANEL: CPT | Performed by: INTERNAL MEDICINE

## 2021-03-13 PROCEDURE — 999N001017 HC STATISTIC GLUCOSE BY METER IP

## 2021-03-13 PROCEDURE — 99233 SBSQ HOSP IP/OBS HIGH 50: CPT | Mod: GC | Performed by: INTERNAL MEDICINE

## 2021-03-13 RX ORDER — WARFARIN SODIUM 4 MG/1
4 TABLET ORAL
Status: COMPLETED | OUTPATIENT
Start: 2021-03-13 | End: 2021-03-13

## 2021-03-13 RX ORDER — POTASSIUM CHLORIDE 750 MG/1
20 TABLET, EXTENDED RELEASE ORAL ONCE
Status: COMPLETED | OUTPATIENT
Start: 2021-03-13 | End: 2021-03-13

## 2021-03-13 RX ORDER — MAGNESIUM SULFATE HEPTAHYDRATE 40 MG/ML
2 INJECTION, SOLUTION INTRAVENOUS ONCE
Status: COMPLETED | OUTPATIENT
Start: 2021-03-13 | End: 2021-03-13

## 2021-03-13 RX ADMIN — CEPHALEXIN 500 MG: 500 CAPSULE ORAL at 20:25

## 2021-03-13 RX ADMIN — HYDRALAZINE HYDROCHLORIDE 125 MG: 100 TABLET, FILM COATED ORAL at 20:25

## 2021-03-13 RX ADMIN — FINASTERIDE 5 MG: 5 TABLET, FILM COATED ORAL at 08:51

## 2021-03-13 RX ADMIN — MAGNESIUM SULFATE HEPTAHYDRATE 2 G: 40 INJECTION, SOLUTION INTRAVENOUS at 06:55

## 2021-03-13 RX ADMIN — FLUOXETINE 30 MG: 20 CAPSULE ORAL at 08:50

## 2021-03-13 RX ADMIN — ASPIRIN 81 MG CHEWABLE TABLET 81 MG: 81 TABLET CHEWABLE at 08:50

## 2021-03-13 RX ADMIN — CEPHALEXIN 500 MG: 500 CAPSULE ORAL at 08:51

## 2021-03-13 RX ADMIN — HYDRALAZINE HYDROCHLORIDE 125 MG: 100 TABLET, FILM COATED ORAL at 04:40

## 2021-03-13 RX ADMIN — DOCUSATE SODIUM 50 MG AND SENNOSIDES 8.6 MG 1 TABLET: 8.6; 5 TABLET, FILM COATED ORAL at 08:53

## 2021-03-13 RX ADMIN — HYDRALAZINE HYDROCHLORIDE 125 MG: 100 TABLET, FILM COATED ORAL at 13:54

## 2021-03-13 RX ADMIN — POTASSIUM CHLORIDE 20 MEQ: 750 TABLET, EXTENDED RELEASE ORAL at 06:56

## 2021-03-13 RX ADMIN — ALLOPURINOL 100 MG: 100 TABLET ORAL at 08:51

## 2021-03-13 RX ADMIN — POTASSIUM CHLORIDE 20 MEQ: 750 TABLET, EXTENDED RELEASE ORAL at 20:26

## 2021-03-13 RX ADMIN — INSULIN ASPART 1 UNITS: 100 INJECTION, SOLUTION INTRAVENOUS; SUBCUTANEOUS at 12:01

## 2021-03-13 RX ADMIN — DOBUTAMINE HYDROCHLORIDE 3 MCG/KG/MIN: 200 INJECTION INTRAVENOUS at 01:51

## 2021-03-13 RX ADMIN — TAMSULOSIN HYDROCHLORIDE 0.4 MG: 0.4 CAPSULE ORAL at 08:51

## 2021-03-13 RX ADMIN — AMIODARONE HYDROCHLORIDE 200 MG: 200 TABLET ORAL at 08:51

## 2021-03-13 RX ADMIN — DOCUSATE SODIUM 50 MG AND SENNOSIDES 8.6 MG 2 TABLET: 8.6; 5 TABLET, FILM COATED ORAL at 20:25

## 2021-03-13 RX ADMIN — ZOLPIDEM TARTRATE 5 MG: 5 TABLET, FILM COATED ORAL at 21:36

## 2021-03-13 RX ADMIN — Medication 1 CAPSULE: at 09:13

## 2021-03-13 RX ADMIN — OMEPRAZOLE 20 MG: 20 CAPSULE, DELAYED RELEASE ORAL at 06:56

## 2021-03-13 RX ADMIN — WARFARIN SODIUM 4 MG: 4 TABLET ORAL at 17:14

## 2021-03-13 RX ADMIN — BUMETANIDE 4 MG: 2 TABLET ORAL at 08:51

## 2021-03-13 ASSESSMENT — ACTIVITIES OF DAILY LIVING (ADL)
ADLS_ACUITY_SCORE: 12

## 2021-03-13 NOTE — PLAN OF CARE
D: Pt presented to OSH with fevers and cough in the setting of syncopal episode transferred to [facility]. Hospitalization complicated by Afib with RVR with hypotension and intubation. Found to be in septic shock 2/2 legionella pneumonia with possible cardiogenic shock. Nephrology was consulted for evaluation and management of anuric BERNARDA. Started on RRT 2/16 and continued to be on dialysis, but now making good amount of urine on Bumex. PMHx:NICM s/p HM III, VT, severe MR, atrial fibrillation on warfarin, hypertension, CKD IV, DMII, HIT.     I: Monitored vitals and assessed pt status.  Changed:Dr. Damian notified of K/Mg low this morning and received orders for replacement.  Running:Dobutamine 3mcg/kg/min via PICC  PRN:Ambien    A: A0x4. VSS, on RA, CPAP overnight. NSR with BBB. Afebrile. Denies Pain. UO improving and weight decresed. LVAD values WNL. Lytes replaced per protocol. K= 3.3(+20_mEq) Mg=1.6  (+2g)     I/O this shift:  In: 400 [P.O.:400]  Out: 800 [Urine:800]    Temp:  [97.9  F (36.6  C)-98.2  F (36.8  C)] 98  F (36.7  C)  Pulse:  [69-80] 73  Resp:  [16-18] 18  BP: (105-125)/(82-97) 125/97  SpO2:  [91 %-99 %] 95 %      P: Continue to monitor Pt status and report changes to treatment team.        .

## 2021-03-13 NOTE — PLAN OF CARE
"D: Admitted 2/15 with mixed cardiogenic and distributive shock w/ intermittent wide-complex tachycardia c/b hypoxemia requiring intubation, PNA, and BERNARDA now on HD. Hx of NICM s/p HM3 2/2020, MSSA drivline infection and bacteremia, VT s/p ICD, CAD, severe MR, afib, HTN, ABHINAV, CKD, DM2, HIT    I/A: A&Ox4, VSS on RA, afebrile. LVAD numbers WNL, speed at 5500 rpm. Sinus Rhythm with a BBB on the tele monitor. Denies pain. Dobutamine gtt stopped at 10 am per order. Pt's creatinine stable at 2.64; he continues to produce adequate urine volumes and didn't require dialysis today. Pt's last dialysis run was three days ago.  Pt denies shortness of breath and does not appear to be more edematous than yesterday. This morning, pt stated \"this is the best I've felt in a very long time\".     P: Continue to monitor and assess pt's cardiopulmonary status. Cards 2 & nephrology team monitoring for pt's potential need for patient to require dialysis as an outpatient in order to find a close enough dialysis location that can care for LVAD patients.    Angelita Zapien, KRISTEN  Cardiology   "

## 2021-03-13 NOTE — PLAN OF CARE
D: Admitted 2/15 with mixed cardiogenic and distributive shock w/ intermittent wide-complex tachycardia c/b hypoxemia requiring intubation, PNA, and BERNARDA now on HD. Hx of NICM s/p HM3 2/2020, MSSA drivline infection and bacteremia, VT s/p ICD, CAD, severe MR, afib, HTN, ABHINAV, CKD, DM2, HIT    I/A: A&Ox4, VSS on RA, afebrile. LVAD numbers WNL, speed at 5500 rpm. Sinus Rhythm on the tele monitor. Denies pain. Pt typically follows a M-W-F dialysis schedule, however, with the onset of larger urine volumes daily, he is undergoing a 'dialysis holiday' with his last dialysis run two days ago (Wednesday). Pt will be evaluated on a daily basis as to whether he needs dialysis. Dobutamine gtt continues at 3 mcg/kg/min; unsure if patient will discharge on dobutamine as he wasn't on this prior to admission. Eating adequate amounts, BS's 87-92 throughout the day, thus no need for insulin. Labs drawn at 1700; K+ 3.2, Cards 2 just contacted for replacement order.    P: Give K+ replacement when available. Continue to monitor per plan of care. Contact Cards 2 for any questions and/or concerns.    Angelita Zapien RN  Cardiology

## 2021-03-13 NOTE — PROGRESS NOTES
[facility]    Faculty Attestation  Gucci Tomas M.D.    I personally saw and examined this patient, reviewed recent laboratories and imaging studies, discussed the case with the housestaff, and conveyed plan to the patient.  I answered all questions from patient and/or family. I agree with the examination, assessment and plan outlined here.          Cardiology Progress Note- Cards 2    Date of Admission:  2/15/2021     Today's Plan  - Awaiting Social work arranging dialysis and placement  - Discontinue dobutamine given good urine output    Assessment & Plan: SL   Eliseo Tanner is a 65 year old male with PMHx relevant for NICM with LVEF 15-20%, NYHA III s/p HM III 2/18/2020 (post op complicated by retrosternal hematoma with bleeding in the lungs), s/p ICD placed on 3/16/2020, h/o MSSA driveline infection and bacteremia 11/5/2020 on prophylactic cephalexin, h/o VT on amiodarone, moderate nonobstructive CAD, severe MR, atrial fibrillation on warfarin, hypertension, ABHINAV requiring CPAP, CKD IV, DMII, HLP,  h/o HIT who presented to the Cardiovascular Unit (4E Cardiac ICU) with mixed cardiogenic and distributive shock withan intermittent wide complex tachycardia. Initially requiring vasopressors and inotropes, intubated for hypoxemia. Treated for legionella pneumonia. Stabilized on low dose dobutamine off of pressors. Extubated. Now on iHD with improving renal status.      Cardiovascular:  Mixed Cardiogenic and Septic Shock - Improved  Multiorgan Failure - Improved  Presented from [facility] with subacute development of shortness of breath, dyspnea on exertion, dizziness/lightheadedness with near syncopal event found to be febrile with intermittent wide complex tachycardia. Recent COVID19 moderna vaccination. CT chest showing bilateral infiltrates. Community acquired pneumonia vs. Possible recurrent of MSSA bacteremia WBC 24.5, Procal  7.95, , Lactacte 6.9. Completed course of IV antibiotics.     Mildly elevated filling pressures on presentation CVP 18, PA 35/20, PCWP 11. Likely some component of cardiogenic shock. LVAD parameters relatively stable despite markedly elevated LDH 8,531. Euvolemic on exam. Worsening renal function, Cr 4.62, up-trending LFT's/ INR. Legionella antigen positive. Weaned off of vasopressor and maintained on dobutamine. Acute hepatic injury which is improving. Improving renal function with increasing UOP and downtrending creatinine, last but continues to require dialysis. Shock resolved.      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 Bhatti (ID clinic)   Troponin elevation (likely demand ischemia)  Essential Hypertension  Hyperlipidemia  Patient with long standing history of NICM previously implanted LVAD (HM III) on 02/18/20 due to worsening functional status and systolic ventricular dysfunction (further complicated by RV dysfunction by VAD hemodynamic compensatory mechanism, VT in ICU now on Amiodarone and Atrial Fibrillation with AVR requiring DCCV on 02/28/20).      Elevated cardiac biomarkers on presentation, SGC with only mildly elevated filling pressures. Euvolemic on exam. Troponin elevation likely related to demand ischemia with no concerns for ACS. Most recent VAD interrogation without any significant Flow-Alarms     LDH 8,531. Initial concern for LVAD thrombus. However given relatively stable LVAD parameters, degree of LDH elevation is out of proportion. Will continue to monitor for LVAD alarms.      - continue dobutamine for renal  recovery- Continue low dose dobutamine @3  - Held ACE-I/ARB/ARNi: Lisinopril; due to acute renal failure  - No BB; deferred 2/2 shock  - Aldosterone antagonist: deferred while other medical therapy is optimized  - SCD prophylaxis: ICD  - Fluid status : fairly euvolemic, Bumex on non-dialysis days, further management per dialysis  - MAP Goal: 65-90  - On Warfarin for HM-III INR Goal 2-3  - Continue Hydralazine  - Continue ASA 81 mg per oral daily       Wide complex tachycardia. Atrial Flutter vs Afib vs Ventricular Tachycardia  History of Atrial Fibrillation s/p DCCV/history of Atrial Flutter   Wide complex tachycardia HR 140s on presentation in the setting of febrile illness and likely pneumonia. Did not initially respond to adenosine. Concern for VT. Intermittently back into HR 60s with underlying rhythm of atrial flutter 3:1 conduction block. Per EP can not rule out VT, may be dual tachycardias. Has been in persistent atrial flutter. S/p CHAMP+DCCV, sucessfully maintaining NSR.       - continue home amiodarone  - Ensure K+ >4.0 mEq/L and Mg+2 >2.0  - continue warfarin  - On cardiac telemetry     Pulmonary  Legionella Pneumonia - resolved  Hypoxemic in the setting of mixed cardiogenic septic shock requiring emergent intubation. O2 requirements quickly improved. S/p extubation on room air.     Infectious disease     Sepsis  Legionella Pneumonia  Bilateral pulmonary infiltrates  CT showing bilateral diffuse patchy consolidations concerning for infectious process. Low suspicion for recurrent driveline or possible hardware infection (history of MSSA Chronic Driveline Infection). Urine without pyuria. Urine legionella ag positive.   - f/u blood cultures  - continue Abx as per ID              -Cefazolin (2/18/21 - 3/1/21) followed by PO cefalexin              Azithromycin (2/15/21 - 2/25/21)     Renal:  Acute on Chronic CKD stage IV likely 2/2 Septic Shock   s/p L TDC receiving IHD with improving status suggestive of renal  recovery  - IHD per nephrology  - Daily Weight's  - Strict I/O's  - Daily BMP's  - Avoid any additional nephrotoxicity   - intermittent bumetanide on non-dialysis days     GI  # Shock Liver - resolved  # Congestive Hepatopathy - resolved  Hepatoccelular pattern of liver injury  > 3,496, AST 1,441 > 8,490 likely 2/2 to septic shock. INR downtrended s/p 3mg IV vit K. RUQ US without portal vein thrombus. LFTs downtrended nicely. No evidence of synthetic dysfunction.      Hematological   Chronic Microcytic/Hypochromic Anemia (likely related to iron deficiency)  Coagulopathy related to sepsis   IgG Kappa monoclonal Gammopathy of undetermined significance  - Monitor Hgb (baseline 8-9g/dL)  - transfuse for Hb < 7.0   - Patient follows with [facility] and [facility] (Dr. Ibarra)     Previous Concern For HIT  On previous hospitalization for LVAD placement (02/06/20-03/20/20), concern for HIT. 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     At that time, patient underwent two HIT panel tests (first one was negative and second antibody test was positive). No known thrombosis events. DAVINA antibody was negative raising question about validity of diagnosis . Per hematology at the time (Dr. James), no other cause for isolated thrombocytopenia. Immediate recovery of plts following d/c of heparin. 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      - avoiding heparin products  - continue warfarin     Endocrine  Type II DM     > Last Hgb A1C: 6.8 (01/06/21)  - Continue PTA Insulin Basaglar 10 Units Aq at bedtime  - On Medium sliding scale insulin  - Oral Hypoglycemia Protocol         DVT Prophylaxis: therapeutic warfarin  Guevara Catheter: not present  Code Status: Full Code  Fluids: no IVFs  Lines: RIROBERTO TD (2/23)    Disposition Plan   Pending establishment of long-term dialysis needs      Entered: Estela Phan MD  03/13/2021, 7:14 AM     The patient's care was discussed with Dr. Tomas.    Estela Phan MD   PGY-1 internal medicine resident  P [phone]  [facility]  Please see sign in/sign out for up to date coverage information  ______________________________________________________________________    Interval History   Nursing notes reviewed. No acute events overnight. No complaints. No shortness of breath, palpitations, fevers or chills.    Data reviewed today: I reviewed all medications, new labs and imaging results over the last 24 hours.    Physical Exam   Vital Signs: Temp: 98  F (36.7  C) Temp src: Oral BP: (!) 125/97 Pulse: 73   Resp: 18 SpO2: 95 % O2 Device: None (Room air)    Weight: 192 lbs 11.2 oz   Gen: awake and in NAD  Neck: JVP ~8-10 cm  Heart: LVAD hum.   Lungs: mild bilateral crackles. No wheezes  GI: Soft, nontender, nondistended.   Extremities: WWP, Trace LE edema  Neuro: grossly non focal  Skin: no rashes.    Data   Recent Labs   Lab 03/13/21  0440 03/12/21  1730 03/12/21  0610 03/12/21  0537 03/11/21  0440 03/11/21  0440   WBC 5.8  --  5.2 Canceled, Test credited  --  4.9   HGB 8.4*  --  8.1* Canceled, Test credited  --  8.6*   MCV 84  --  81 Canceled, Test credited  --  85     --  224 Canceled, Test credited  --  219   INR 2.45*  --   --  2.59*  --  2.60*    132*  --  136   < > 133   POTASSIUM 3.3* 3.2*  --  3.5   < > 3.8   CHLORIDE 100 96  --  100   < > 99   CO2 29 29  --  27   < > 28   BUN 60* 62*  --  58*   < > 42*   CR 2.64* 2.65*  --  2.76*   < > 2.48*   ANIONGAP 8 7  --  8   < > 6   CALOS 8.0* 8.4*  --  8.2*   < > 8.5   * 87  --  105*   < > 111*   ALBUMIN 2.9* 3.2*  --  2.9*   < > 2.9*   PROTTOTAL 7.4 7.9  --  7.3   < > 7.7   BILITOTAL 0.4 0.5  --  0.4   < > 0.4   ALKPHOS 120 123  --  114   < > 124   ALT 29 32  --  28   < > 31   AST 35 45  --  44   < > 45    < > = values in this interval not displayed.     No results found for  this or any previous visit (from the past 24 hour(s)).  Medications     dextrose       dextrose Stopped (02/17/21 1600)     DOBUTamine 3 mcg/kg/min (03/13/21 0151)     Warfarin Therapy Reminder         allopurinol  100 mg Oral or Feeding Tube Daily     amiodarone  200 mg Oral Daily     aspirin  81 mg Oral Daily     bumetanide  4 mg Oral Daily     cephALEXin  500 mg Oral Q12H BRITTANI     finasteride  5 mg Oral Daily     FLUoxetine  30 mg Oral Daily     hydrALAZINE  125 mg Oral Q8H BRITTANI     insulin aspart  1-7 Units Subcutaneous TID AC     insulin aspart  1-5 Units Subcutaneous At Bedtime     melatonin  3 mg Oral At Bedtime     multivitamin RENAL  1 capsule Oral Daily     omeprazole  20 mg Oral QAM AC     senna-docusate  2 tablet Oral BID     tamsulosin  0.4 mg Oral Daily

## 2021-03-13 NOTE — PROGRESS NOTES
Nephrology Progress Note  03/13/2021         Assessment & Recommendations:   64 yo M with PMHx  NICM  s/p HM III , VT, severe MR, atrial fibrillation on warfarin, hypertension, CKD IV, DMII, HIT presented to OSH with fevers and cough in the setting of syncopal episode transferred to [facility] for further cares. Hospitalization complicated by Afib with RVR with hypotension and intubation with upgrade of cares to the ICU. Found to be in septic shock 2/2 legionella pneumonia with possible cardiogenic shock. Nephrology was consulted for evaluation and management of anuric BERNARDA. Started on RRT 2/16 and continued to be on dialysis, but now making good amount of urine on Bumex.      Dialysis dependent non-oligouric BERNARDA 2/2 ischemic ATN  Baseline Cr last yr s/p LVAD placement was around 1. On admission ~2.3 and doubled within 24 hours with rapid decrease in UOP. UA with baseline proteinuria and new granular casts. Initiated on RRT 2/16. Pt remained on dialysis, last HD session was on 3/10, but now making good amount of urine with slightly down trending creatinine.  - Assess for any renal recovery every day  - no acute indication for HD today based on labs or exam  - please consider to decrease Bumex as able as his potassium is trending down and he seems euvolemic on exam and low BP.  - Avoid nephrotoxins as able  - Renally dose meds  - Monitor I/Os     Volume status  Euvolemic  - Daily weights, Is/Os  - Diuresis as above     Electrolytes   Mild Hyponatremia - CTM     Acid/base - stable   Anemia - Hb 8.2, stable. Will provide iron with next HD session     Recommendations were communicated to primary team via this note     Seen and discussed with Dr. Ramona Mabry MD   [phone]    Interval History :   Nursing and provider notes from last 24 hours reviewed.  In the last 24 hours Eliseo Cunninghamse been stable. He is doing well, denied any new symptoms    Physical Exam:   I/O last 3 completed shifts:  In: 1544.15  [P.O.:1400; I.V.:144.15]  Out: 3725 [Urine:3725]   BP (!) 115/99 (BP Location: Left arm)   Pulse 67   Temp 98.1  F (36.7  C) (Oral)   Resp 16   Wt 87.4 kg (192 lb 11.2 oz)   SpO2 99%   BMI 30.18 kg/m       General : Pt awake, not in acute distress   Lungs : anterior lung fields are clear  Cardiac : S1, S2 present  Abdomen : Soft/ND/NT  LE : Edema noted  Dialysis Access : right perm cath    Labs:   All labs reviewed by me  Electrolytes/Renal -   Recent Labs   Lab Test 03/13/21 0440 03/12/21  1730 03/12/21  0537 03/11/21  0440 03/11/21  0440 02/22/21  1539 02/22/21  1539 02/22/21  0353 02/22/21  0353 02/21/21  1617 02/21/21  1617    132* 136   < > 133   < >  --    < > 136   < >  --    POTASSIUM 3.3* 3.2* 3.5   < > 3.8   < >  --    < > 4.7   < >  --    CHLORIDE 100 96 100   < > 99   < >  --    < > 106   < >  --    CO2 29 29 27   < > 28   < >  --    < > 24   < >  --    BUN 60* 62* 58*   < > 42*   < >  --    < > 30   < >  --    CR 2.64* 2.65* 2.76*   < > 2.48*   < >  --    < > 1.94*   < >  --    * 87 105*   < > 111*   < >  --    < > 109*   < >  --    CALOS 8.0* 8.4* 8.2*   < > 8.5   < >  --    < > 8.1*   < >  --    MAG 1.6  --  1.6  --  1.5*   < > 2.6*  --  2.5*  --  2.6*   PHOS  --   --   --   --   --   --  5.2*  --  4.4  --  4.5    < > = values in this interval not displayed.       CBC -   Recent Labs   Lab Test 03/13/21 0440 03/12/21  0610 03/12/21  0537   WBC 5.8 5.2 Canceled, Test credited   HGB 8.4* 8.1* Canceled, Test credited    224 Canceled, Test credited       LFTs -   Recent Labs   Lab Test 03/13/21  0440 03/12/21  1730 03/12/21  0537   ALKPHOS 120 123 114   BILITOTAL 0.4 0.5 0.4   ALT 29 32 28   AST 35 45 44   PROTTOTAL 7.4 7.9 7.3   ALBUMIN 2.9* 3.2* 2.9*       Iron Panel -   Recent Labs   Lab Test 02/27/21  0415 11/16/20  0616 02/08/20  0408   IRON 28* 16* 25*   IRONSAT 9* 6* 8*   HEAVENLY 276 75 189     Current Medications:    allopurinol  100 mg Oral or Feeding Tube Daily      amiodarone  200 mg Oral Daily     aspirin  81 mg Oral Daily     bumetanide  4 mg Oral Daily     cephALEXin  500 mg Oral Q12H BRITTANI     finasteride  5 mg Oral Daily     FLUoxetine  30 mg Oral Daily     hydrALAZINE  125 mg Oral Q8H BRITTANI     insulin aspart  1-7 Units Subcutaneous TID AC     insulin aspart  1-5 Units Subcutaneous At Bedtime     melatonin  3 mg Oral At Bedtime     multivitamin RENAL  1 capsule Oral Daily     omeprazole  20 mg Oral QAM AC     senna-docusate  2 tablet Oral BID     tamsulosin  0.4 mg Oral Daily     warfarin ANTICOAGULANT  4 mg Oral ONCE at 18:00       dextrose       dextrose Stopped (02/17/21 1600)     Warfarin Therapy Reminder       Pao Mabry MD     his patient has been evaluated by me, Harpreet Greene MD, on 3/13/2021.  I discussed with the fellow, Dr. Mabry, and agree with the findings and plan in this note.  I have reviewed medications, vital signs and labs.    Harpreet Greene MD

## 2021-03-14 LAB
ALBUMIN SERPL-MCNC: 3.1 G/DL (ref 3.4–5)
ALBUMIN SERPL-MCNC: 3.3 G/DL (ref 3.4–5)
ALP SERPL-CCNC: 122 U/L (ref 40–150)
ALP SERPL-CCNC: 128 U/L (ref 40–150)
ALT SERPL W P-5'-P-CCNC: 31 U/L (ref 0–70)
ALT SERPL W P-5'-P-CCNC: 32 U/L (ref 0–70)
ANION GAP SERPL CALCULATED.3IONS-SCNC: 7 MMOL/L (ref 3–14)
ANION GAP SERPL CALCULATED.3IONS-SCNC: 7 MMOL/L (ref 3–14)
APTT PPP: 36 SEC (ref 22–37)
AST SERPL W P-5'-P-CCNC: 42 U/L (ref 0–45)
AST SERPL W P-5'-P-CCNC: 42 U/L (ref 0–45)
BILIRUB SERPL-MCNC: 0.4 MG/DL (ref 0.2–1.3)
BILIRUB SERPL-MCNC: 0.4 MG/DL (ref 0.2–1.3)
BUN SERPL-MCNC: 67 MG/DL (ref 7–30)
BUN SERPL-MCNC: 68 MG/DL (ref 7–30)
CALCIUM SERPL-MCNC: 8.3 MG/DL (ref 8.5–10.1)
CALCIUM SERPL-MCNC: 8.4 MG/DL (ref 8.5–10.1)
CHLORIDE SERPL-SCNC: 96 MMOL/L (ref 94–109)
CHLORIDE SERPL-SCNC: 98 MMOL/L (ref 94–109)
CO2 SERPL-SCNC: 29 MMOL/L (ref 20–32)
CO2 SERPL-SCNC: 30 MMOL/L (ref 20–32)
CREAT SERPL-MCNC: 2.36 MG/DL (ref 0.66–1.25)
CREAT SERPL-MCNC: 2.59 MG/DL (ref 0.66–1.25)
ERYTHROCYTE [DISTWIDTH] IN BLOOD BY AUTOMATED COUNT: 22.2 % (ref 10–15)
GFR SERPL CREATININE-BSD FRML MDRD: 24 ML/MIN/{1.73_M2}
GFR SERPL CREATININE-BSD FRML MDRD: 27 ML/MIN/{1.73_M2}
GLUCOSE BLDC GLUCOMTR-MCNC: 112 MG/DL (ref 70–99)
GLUCOSE BLDC GLUCOMTR-MCNC: 137 MG/DL (ref 70–99)
GLUCOSE BLDC GLUCOMTR-MCNC: 221 MG/DL (ref 70–99)
GLUCOSE BLDC GLUCOMTR-MCNC: 98 MG/DL (ref 70–99)
GLUCOSE SERPL-MCNC: 107 MG/DL (ref 70–99)
GLUCOSE SERPL-MCNC: 95 MG/DL (ref 70–99)
HCT VFR BLD AUTO: 28.5 % (ref 40–53)
HGB BLD-MCNC: 8.5 G/DL (ref 13.3–17.7)
INR PPP: 2.53 (ref 0.86–1.14)
LDH SERPL L TO P-CCNC: 310 U/L (ref 85–227)
MAGNESIUM SERPL-MCNC: 1.9 MG/DL (ref 1.6–2.3)
MCH RBC QN AUTO: 24.7 PG (ref 26.5–33)
MCHC RBC AUTO-ENTMCNC: 29.8 G/DL (ref 31.5–36.5)
MCV RBC AUTO: 83 FL (ref 78–100)
PLATELET # BLD AUTO: 259 10E9/L (ref 150–450)
POTASSIUM SERPL-SCNC: 3.3 MMOL/L (ref 3.4–5.3)
POTASSIUM SERPL-SCNC: 3.4 MMOL/L (ref 3.4–5.3)
PROT SERPL-MCNC: 7.6 G/DL (ref 6.8–8.8)
PROT SERPL-MCNC: 8.2 G/DL (ref 6.8–8.8)
RBC # BLD AUTO: 3.44 10E12/L (ref 4.4–5.9)
SODIUM SERPL-SCNC: 133 MMOL/L (ref 133–144)
SODIUM SERPL-SCNC: 134 MMOL/L (ref 133–144)
WBC # BLD AUTO: 5.9 10E9/L (ref 4–11)

## 2021-03-14 PROCEDURE — 83615 LACTATE (LD) (LDH) ENZYME: CPT | Performed by: INTERNAL MEDICINE

## 2021-03-14 PROCEDURE — 85027 COMPLETE CBC AUTOMATED: CPT | Performed by: INTERNAL MEDICINE

## 2021-03-14 PROCEDURE — 99233 SBSQ HOSP IP/OBS HIGH 50: CPT | Mod: GC | Performed by: INTERNAL MEDICINE

## 2021-03-14 PROCEDURE — 250N000013 HC RX MED GY IP 250 OP 250 PS 637: Performed by: STUDENT IN AN ORGANIZED HEALTH CARE EDUCATION/TRAINING PROGRAM

## 2021-03-14 PROCEDURE — 85610 PROTHROMBIN TIME: CPT | Performed by: INTERNAL MEDICINE

## 2021-03-14 PROCEDURE — 99232 SBSQ HOSP IP/OBS MODERATE 35: CPT | Mod: GC | Performed by: INTERNAL MEDICINE

## 2021-03-14 PROCEDURE — 999N000157 HC STATISTIC RCP TIME EA 10 MIN

## 2021-03-14 PROCEDURE — 214N000001 HC R&B CCU UMMC

## 2021-03-14 PROCEDURE — 80053 COMPREHEN METABOLIC PANEL: CPT | Performed by: INTERNAL MEDICINE

## 2021-03-14 PROCEDURE — 93005 ELECTROCARDIOGRAM TRACING: CPT

## 2021-03-14 PROCEDURE — 93010 ELECTROCARDIOGRAM REPORT: CPT | Performed by: INTERNAL MEDICINE

## 2021-03-14 PROCEDURE — 85730 THROMBOPLASTIN TIME PARTIAL: CPT | Performed by: INTERNAL MEDICINE

## 2021-03-14 PROCEDURE — 999N001017 HC STATISTIC GLUCOSE BY METER IP

## 2021-03-14 PROCEDURE — 250N000013 HC RX MED GY IP 250 OP 250 PS 637: Performed by: INTERNAL MEDICINE

## 2021-03-14 PROCEDURE — 83735 ASSAY OF MAGNESIUM: CPT | Performed by: INTERNAL MEDICINE

## 2021-03-14 PROCEDURE — 36592 COLLECT BLOOD FROM PICC: CPT | Performed by: INTERNAL MEDICINE

## 2021-03-14 RX ORDER — MAGNESIUM OXIDE 400 MG/1
400 TABLET ORAL ONCE
Status: COMPLETED | OUTPATIENT
Start: 2021-03-14 | End: 2021-03-14

## 2021-03-14 RX ORDER — WARFARIN SODIUM 4 MG/1
4 TABLET ORAL
Status: COMPLETED | OUTPATIENT
Start: 2021-03-14 | End: 2021-03-14

## 2021-03-14 RX ORDER — POTASSIUM CHLORIDE 1.5 G/1.58G
40 POWDER, FOR SOLUTION ORAL 2 TIMES DAILY
Status: DISCONTINUED | OUTPATIENT
Start: 2021-03-14 | End: 2021-03-16

## 2021-03-14 RX ADMIN — HYDRALAZINE HYDROCHLORIDE 125 MG: 100 TABLET, FILM COATED ORAL at 21:06

## 2021-03-14 RX ADMIN — POTASSIUM CHLORIDE 40 MEQ: 1.5 POWDER, FOR SOLUTION ORAL at 10:37

## 2021-03-14 RX ADMIN — ASPIRIN 81 MG CHEWABLE TABLET 81 MG: 81 TABLET CHEWABLE at 08:06

## 2021-03-14 RX ADMIN — ALLOPURINOL 100 MG: 100 TABLET ORAL at 08:07

## 2021-03-14 RX ADMIN — AMIODARONE HYDROCHLORIDE 200 MG: 200 TABLET ORAL at 08:07

## 2021-03-14 RX ADMIN — MAGNESIUM OXIDE 400 MG: 400 TABLET ORAL at 10:37

## 2021-03-14 RX ADMIN — HYDRALAZINE HYDROCHLORIDE 125 MG: 100 TABLET, FILM COATED ORAL at 12:19

## 2021-03-14 RX ADMIN — OMEPRAZOLE 20 MG: 20 CAPSULE, DELAYED RELEASE ORAL at 08:07

## 2021-03-14 RX ADMIN — DOCUSATE SODIUM 50 MG AND SENNOSIDES 8.6 MG 2 TABLET: 8.6; 5 TABLET, FILM COATED ORAL at 19:44

## 2021-03-14 RX ADMIN — WARFARIN SODIUM 4 MG: 4 TABLET ORAL at 17:51

## 2021-03-14 RX ADMIN — FLUOXETINE 30 MG: 20 CAPSULE ORAL at 08:07

## 2021-03-14 RX ADMIN — Medication 1 CAPSULE: at 08:07

## 2021-03-14 RX ADMIN — TAMSULOSIN HYDROCHLORIDE 0.4 MG: 0.4 CAPSULE ORAL at 08:08

## 2021-03-14 RX ADMIN — POTASSIUM CHLORIDE 40 MEQ: 1.5 POWDER, FOR SOLUTION ORAL at 19:44

## 2021-03-14 RX ADMIN — BUMETANIDE 4 MG: 2 TABLET ORAL at 08:07

## 2021-03-14 RX ADMIN — HYDRALAZINE HYDROCHLORIDE 125 MG: 100 TABLET, FILM COATED ORAL at 04:51

## 2021-03-14 RX ADMIN — FINASTERIDE 5 MG: 5 TABLET, FILM COATED ORAL at 08:07

## 2021-03-14 RX ADMIN — CEPHALEXIN 500 MG: 500 CAPSULE ORAL at 19:44

## 2021-03-14 RX ADMIN — ZOLPIDEM TARTRATE 5 MG: 5 TABLET, FILM COATED ORAL at 21:48

## 2021-03-14 RX ADMIN — CEPHALEXIN 500 MG: 500 CAPSULE ORAL at 08:07

## 2021-03-14 ASSESSMENT — ACTIVITIES OF DAILY LIVING (ADL)
ADLS_ACUITY_SCORE: 10

## 2021-03-14 NOTE — PROGRESS NOTES
[facility]    Faculty Attestation  Gucci Tomas M.D.    I personally saw and examined this patient, reviewed recent laboratories and imaging studies, discussed the case with the housestaff, and conveyed plan to the patient.  I answered all questions from patient and/or family. I agree with the examination, assessment and plan outlined here.          Cardiology Progress Note- Cards 2    Date of Admission:  2/15/2021     Today's Plan  - Decrease Bumex to 3 mg TID    Assessment & Plan: HVSL   Eliseo Tanner is a 65 year old male with PMHx relevant for NICM with LVEF 15-20%, NYHA III s/p HM III 2/18/2020 (post op complicated by retrosternal hematoma with bleeding in the lungs), s/p ICD placed on 3/16/2020, h/o MSSA driveline infection and bacteremia 11/5/2020 on prophylactic cephalexin, h/o VT on amiodarone, moderate nonobstructive CAD, severe MR, atrial fibrillation on warfarin, hypertension, ABHINAV requiring CPAP, CKD IV, DMII, HLP,  h/o HIT who presented to the Cardiovascular Unit (4E Cardiac ICU) with mixed cardiogenic and distributive shock withan intermittent wide complex tachycardia. Initially requiring vasopressors and inotropes, intubated for hypoxemia. Treated for legionella pneumonia. Stabilized on low dose dobutamine off of pressors. Extubated. Now on iHD with improving renal status.      Cardiovascular:  Mixed Cardiogenic and Septic Shock - Improved  Multiorgan Failure - Improved  Presented from [facility] with subacute development of shortness of breath, dyspnea on exertion, dizziness/lightheadedness with near syncopal event found to be febrile with intermittent wide complex tachycardia. Recent COVID19 moderna vaccination. CT chest showing bilateral infiltrates. Community acquired pneumonia vs. Possible recurrent of MSSA bacteremia WBC 24.5, Procal 7.95, , Lactacte 6.9. Completed course of IV antibiotics.     Mildly  elevated filling pressures on presentation CVP 18, PA 35/20, PCWP 11. Likely some component of cardiogenic shock. LVAD parameters relatively stable despite markedly elevated LDH 8,531. Euvolemic on exam. Worsening renal function, Cr 4.62, up-trending LFT's/ INR. Legionella antigen positive. Weaned off of vasopressor and maintained on dobutamine. Acute hepatic injury which is improving. Improving renal function with increasing UOP and downtrending creatinine, last but continues to require dialysis. Shock resolved.      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 Bhatti (ID clinic)   Troponin elevation (likely demand ischemia)  Essential Hypertension  Hyperlipidemia  Patient with long standing history of NICM previously implanted LVAD (HM III) on 02/18/20 due to worsening functional status and systolic ventricular dysfunction (further complicated by RV dysfunction by VAD hemodynamic compensatory mechanism, VT in ICU now on Amiodarone and Atrial Fibrillation with AVR requiring DCCV on 02/28/20).      Elevated cardiac biomarkers on presentation, SGC with only mildly elevated filling pressures. Euvolemic on exam. Troponin elevation likely related to demand ischemia with no concerns for ACS. Most recent VAD interrogation without any significant Flow-Alarms     LDH 8,531. Initial concern for LVAD thrombus. However given relatively stable LVAD parameters, degree of LDH elevation is out of proportion. Will continue to monitor for LVAD alarms.      - Held ACE-I/ARB/ARNi: Lisinopril; due to acute renal failure  - No BB; deferred 2/2 shock  - Aldosterone antagonist: deferred while other medical therapy is optimized  - SCD  prophylaxis: ICD  - Fluid status : fairly euvolemic, Bumex 3 mg TID  - MAP Goal: 65-90  - On Warfarin for HM-III INR Goal 2-3  - Continue Hydralazine  - Continue ASA 81 mg per oral daily       Wide complex tachycardia. Atrial Flutter vs Afib vs Ventricular Tachycardia  History of Atrial Fibrillation s/p DCCV/history of Atrial Flutter   Wide complex tachycardia HR 140s on presentation in the setting of febrile illness and likely pneumonia. Did not initially respond to adenosine. Concern for VT. Intermittently back into HR 60s with underlying rhythm of atrial flutter 3:1 conduction block. Per EP can not rule out VT, may be dual tachycardias. Has been in persistent atrial flutter. S/p CHAMP+DCCV, sucessfully maintaining NSR.       - continue home amiodarone  - Ensure K+ >4.0 mEq/L and Mg+2 >2.0  - continue warfarin  - On cardiac telemetry     Pulmonary  Legionella Pneumonia - resolved  Hypoxemic in the setting of mixed cardiogenic septic shock requiring emergent intubation. O2 requirements quickly improved. S/p extubation on room air.     Infectious disease     Sepsis  Legionella Pneumonia  Bilateral pulmonary infiltrates  CT showing bilateral diffuse patchy consolidations concerning for infectious process. Low suspicion for recurrent driveline or possible hardware infection (history of MSSA Chronic Driveline Infection). Urine without pyuria. Urine legionella ag positive.   - f/u blood cultures  - continue Abx as per ID              -Cefazolin (2/18/21 - 3/1/21) followed by PO cefalexin              Azithromycin (2/15/21 - 2/25/21)     Renal:  Acute on Chronic CKD stage IV likely 2/2 Septic Shock   s/p L TDC receiving IHD with improving status suggestive of renal recovery  - IHD per nephrology  - Daily Weight's  - Strict I/O's  - Daily BMP's  - Avoid any additional nephrotoxicity   - intermittent bumetanide on non-dialysis days     GI  # Shock Liver - resolved  # Congestive Hepatopathy - resolved  Hepatoccelular pattern  of liver injury  > 3,496, AST 1,441 > 8,490 likely 2/2 to septic shock. INR downtrended s/p 3mg IV vit K. RUQ US without portal vein thrombus. LFTs downtrended nicely. No evidence of synthetic dysfunction.      Hematological   Chronic Microcytic/Hypochromic Anemia (likely related to iron deficiency)  Coagulopathy related to sepsis   IgG Kappa monoclonal Gammopathy of undetermined significance  - Monitor Hgb (baseline 8-9g/dL)  - transfuse for Hb < 7.0   - Patient follows with [facility] and [facility] (Dr. Ibarra)     Previous Concern For HIT  On previous hospitalization for LVAD placement (02/06/20-03/20/20), concern for HIT. Platelets 250K --> ~ 90K wuth documented history of exposure to unfractionate heparin products at OSH prior to his installation at the [facility].     At that time, patient underwent two HIT panel tests (first one was negative and second antibody test was positive). No known thrombosis events. DAVINA antibody was negative raising question about validity of diagnosis . Per hematology at the time (Dr. James), no other cause for isolated thrombocytopenia. Immediate recovery of plts following d/c of heparin. 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      - avoiding heparin products  - continue warfarin     Endocrine  Type II DM     > Last Hgb A1C: 6.8 (01/06/21)  - Continue PTA Insulin Basaglar 10 Units Aq at bedtime  - On Medium sliding scale insulin  - Oral Hypoglycemia Protocol         DVT Prophylaxis: therapeutic warfarin  Guevara Catheter: not present  Code Status: Full Code  Fluids: no IVFs  Lines: [facility] (2/23)    Disposition Plan   Pending establishment of long-term dialysis needs      Entered: Estela Phan MD 03/14/2021, 6:54 AM     The patient's care was discussed with Dr. Tomas.    Estela Phan MD   PGY-1 internal medicine resident  P [phone]  [facility]  Please see sign in/sign out for up to date coverage  information  ______________________________________________________________________    Interval History   Nursing notes reviewed. No acute events overnight. No complaints. No shortness of breath, palpitations, fevers or chills.    Data reviewed today: I reviewed all medications, new labs and imaging results over the last 24 hours.    Physical Exam   Vital Signs: Temp: 97.8  F (36.6  C) Temp src: Oral BP: (!) 102/93 Pulse: 74   Resp: 16 SpO2: 97 % O2 Device: None (Room air)    Weight: 192 lbs 12.8 oz   Gen: awake and in NAD  Neck: JVP ~8-10 cm  Heart: LVAD hum.   Lungs: mild bilateral crackles. No wheezes  GI: Soft, nontender, nondistended.   Extremities: WWP, Trace LE edema  Neuro: grossly non focal  Skin: no rashes.    Data   Recent Labs   Lab 03/14/21  0459 03/13/21  1803 03/13/21  0440 03/12/21  0610 03/12/21  0610 03/12/21  0537   WBC 5.9  --  5.8  --  5.2 Canceled, Test credited   HGB 8.5*  --  8.4*  --  8.1* Canceled, Test credited   MCV 83  --  84  --  81 Canceled, Test credited     --  257  --  224 Canceled, Test credited   INR 2.53*  --  2.45*  --   --  2.59*    134 136   < >  --  136   POTASSIUM 3.3* 3.4 3.3*   < >  --  3.5   CHLORIDE 98 97 100   < >  --  100   CO2 29 29 29   < >  --  27   BUN 67* 64* 60*   < >  --  58*   CR 2.59* 2.45* 2.64*   < >  --  2.76*   ANIONGAP 7 8 8   < >  --  8   CALOS 8.4* 8.3* 8.0*   < >  --  8.2*   * 96 101*   < >  --  105*   ALBUMIN 3.1* 3.3* 2.9*   < >  --  2.9*   PROTTOTAL 7.6 8.3 7.4   < >  --  7.3   BILITOTAL 0.4 0.4 0.4   < >  --  0.4   ALKPHOS 122 134 120   < >  --  114   ALT 31 34 29   < >  --  28   AST 42 45 35   < >  --  44    < > = values in this interval not displayed.     No results found for this or any previous visit (from the past 24 hour(s)).  Medications     dextrose       dextrose Stopped (02/17/21 1600)     Warfarin Therapy Reminder         allopurinol  100 mg Oral or Feeding Tube Daily     amiodarone  200 mg Oral Daily     aspirin  81 mg  Oral Daily     bumetanide  4 mg Oral Daily     cephALEXin  500 mg Oral Q12H BRITTANI     finasteride  5 mg Oral Daily     FLUoxetine  30 mg Oral Daily     hydrALAZINE  125 mg Oral Q8H BRITTANI     insulin aspart  1-7 Units Subcutaneous TID AC     insulin aspart  1-5 Units Subcutaneous At Bedtime     melatonin  3 mg Oral At Bedtime     multivitamin RENAL  1 capsule Oral Daily     omeprazole  20 mg Oral QAM AC     senna-docusate  2 tablet Oral BID     tamsulosin  0.4 mg Oral Daily

## 2021-03-14 NOTE — PLAN OF CARE
D:  Pt with PMHx  NICM  s/p HM III on 2/20 , MSSA, driveline infection, VT, s/p ICD, severe MR, atrial fibrillation on warfarin,HTN, CKD IV, DMII, HIT came to OSH with fevers and cough, syncopal episode transferred to [facility] for further cares. Hospitalization complicated by Afib with RVR,hypotesion, BERNARDA now on HD, found to be  mixed in septic shock  and cardiogenic  shock.   Making AUO, on Bumex.     I: Monitored vitals and assessed pt status.    PRN: Ambien   A: A0x4. VSS, on home CPAP overnight.  SR/SB, BBB. Afebrile. Denies any pain,no nausea reported. Lytes replaced per protocol. K= (+ 20_mEq)  AUO.Dressings CDI. Blood sugars 175 at bedtime. LVAD #s stable, no alarms overnight.        Temp:  [97.8  F (36.6  C)-98.3  F (36.8  C)] 97.8  F (36.6  C)  Pulse:  [67-95] 95  Resp:  [16-18] 16  BP: (100-118)/(77-99) 118/97  SpO2:  [95 %-99 %] 97 %      P: Continue to monitor Pt status and report changes to treatment team.

## 2021-03-14 NOTE — PROGRESS NOTES
Nephrology Progress Note  03/14/2021         Assessment & Recommendations:   66 yo M with PMHx  NICM  s/p HM III , VT, severe MR, atrial fibrillation on warfarin, hypertension, CKD IV, DMII, HIT presented to OSH with fevers and cough in the setting of syncopal episode transferred to [facility] for further cares. Hospitalization complicated by Afib with RVR with hypotension and intubation with upgrade of cares to the ICU. Found to be in septic shock 2/2 legionella pneumonia with possible cardiogenic shock. Nephrology was consulted for evaluation and management of anuric BERNARDA. Started on RRT 2/16 and continued to be on dialysis, but now making good amount of urine on Bumex.      Dialysis dependent non-oligouric BERNARDA 2/2 ischemic ATN  Baseline Cr last yr s/p LVAD placement was around 1. On admission ~2.3 and doubled within 24 hours with rapid decrease in UOP. UA with baseline proteinuria and new granular casts. Initiated on RRT 2/16. Pt remained on dialysis, last HD session was on 3/10, but now making good amount of urine with slightly down trending creatinine.  - will assess for any renal recovery every day  - no acute indication for HD today based on labs or exam  - recommend to decrease diuretic dose as pt is euosmic on exam and his blood pressures are low.  - Avoid nephrotoxins as able  - Renally dose meds  - Monitor I/Os     Volume status  Euvolemic  - Daily weights, Is/Os  - Diuresis as above     Electrolytes   Mild Hyponatremia - CTM     Acid/base - stable   Anemia - Hb 8.2, stable. Will provide iron with next HD session     Recommendations were communicated to primary team via this note     Seen and discussed with Dr. Ramona Mabry MD   [phone]    Interval History :   Nursing and provider notes from last 24 hours reviewed.  In the last 24 hours Eliseo Cunninghamse been stable. He is doing well, denied any new symptoms    Physical Exam:   I/O last 3 completed shifts:  In: 720 [P.O.:700;  I.V.:20]  Out: 1980 [Urine:1980]   BP (!) 85/68 (BP Location: Left arm)   Pulse 70   Temp 97.1  F (36.2  C) (Axillary)   Resp 16   Wt 87.5 kg (192 lb 12.8 oz)   SpO2 95%   BMI 30.20 kg/m       General : Pt awake, not in acute distress   Lungs : anterior lung fields are clear  Cardiac : S1, S2 present  Abdomen : Soft/ND/NT  LE : Edema noted  Dialysis Access : right perm cath    Labs:   All labs reviewed by me  Electrolytes/Renal -   Recent Labs   Lab Test 03/14/21 0459 03/13/21  1803 03/13/21  0440 03/12/21  0537 03/12/21  0537 02/22/21  1539 02/22/21  1539 02/22/21  0353 02/22/21  0353 02/21/21  1617 02/21/21  1617    134 136   < > 136   < >  --    < > 136   < >  --    POTASSIUM 3.3* 3.4 3.3*   < > 3.5   < >  --    < > 4.7   < >  --    CHLORIDE 98 97 100   < > 100   < >  --    < > 106   < >  --    CO2 29 29 29   < > 27   < >  --    < > 24   < >  --    BUN 67* 64* 60*   < > 58*   < >  --    < > 30   < >  --    CR 2.59* 2.45* 2.64*   < > 2.76*   < >  --    < > 1.94*   < >  --    * 96 101*   < > 105*   < >  --    < > 109*   < >  --    CALOS 8.4* 8.3* 8.0*   < > 8.2*   < >  --    < > 8.1*   < >  --    MAG 1.9  --  1.6  --  1.6   < > 2.6*  --  2.5*  --  2.6*   PHOS  --   --   --   --   --   --  5.2*  --  4.4  --  4.5    < > = values in this interval not displayed.       CBC -   Recent Labs   Lab Test 03/14/21 0459 03/13/21 0440 03/12/21  0610   WBC 5.9 5.8 5.2   HGB 8.5* 8.4* 8.1*    257 224       LFTs -   Recent Labs   Lab Test 03/14/21  0459 03/13/21  1803 03/13/21  0440   ALKPHOS 122 134 120   BILITOTAL 0.4 0.4 0.4   ALT 31 34 29   AST 42 45 35   PROTTOTAL 7.6 8.3 7.4   ALBUMIN 3.1* 3.3* 2.9*       Iron Panel -   Recent Labs   Lab Test 02/27/21  0415 11/16/20  0616 02/08/20  0408   IRON 28* 16* 25*   IRONSAT 9* 6* 8*   HEAVENLY 276 75 189     Current Medications:    allopurinol  100 mg Oral or Feeding Tube Daily     amiodarone  200 mg Oral Daily     aspirin  81 mg Oral Daily     bumetanide  4 mg  Oral Daily     cephALEXin  500 mg Oral Q12H BRITTANI     finasteride  5 mg Oral Daily     FLUoxetine  30 mg Oral Daily     hydrALAZINE  125 mg Oral Q8H BRITTANI     insulin aspart  1-7 Units Subcutaneous TID AC     insulin aspart  1-5 Units Subcutaneous At Bedtime     melatonin  3 mg Oral At Bedtime     multivitamin RENAL  1 capsule Oral Daily     omeprazole  20 mg Oral QAM AC     potassium chloride  40 mEq Oral BID     senna-docusate  2 tablet Oral BID     tamsulosin  0.4 mg Oral Daily       dextrose       dextrose Stopped (02/17/21 1600)     Warfarin Therapy Reminder       Pao Mabry MD

## 2021-03-14 NOTE — PLAN OF CARE
Admitted 2/15 from an outside hospital with mixed cardiogenic and distributive shock, BERNARDA, and legionella pneumonia. Pneumonia now resolved. Medical history includes: NICM (LVEF 15-20%) s/p HM3 (02/2020) and ICD (03/2020), MSSA driveline infection and bacteremia, VT on amiodarone, nonobstructive CAD, severe MR, atrial fibrillation, HTN, ABHINAV on home CPAP, CKD IV, DM2, HIT. VSS, sinus rhythm, denies pain, on room air with clear lung sounds. PICC line caps changed, [place] bath done, VAD dressing change done / alarms in place and functional. No remarkable VAD numbers to report. Patient voiding well and labs showing improved kidney function / per nephrology: will reevaluate situation with dialysis 3/15 AM with new creatinine lab. Left eyelid bruised, unknown cause Cards 2 informed. Continuing to monitor.

## 2021-03-15 ENCOUNTER — APPOINTMENT (OUTPATIENT)
Dept: OCCUPATIONAL THERAPY | Facility: CLINIC | Age: 66
DRG: 870 | End: 2021-03-15
Attending: INTERNAL MEDICINE
Payer: MEDICARE

## 2021-03-15 ENCOUNTER — APPOINTMENT (OUTPATIENT)
Dept: GENERAL RADIOLOGY | Facility: CLINIC | Age: 66
DRG: 870 | End: 2021-03-15
Attending: STUDENT IN AN ORGANIZED HEALTH CARE EDUCATION/TRAINING PROGRAM
Payer: MEDICARE

## 2021-03-15 LAB
ALBUMIN SERPL-MCNC: 2.9 G/DL (ref 3.4–5)
ALP SERPL-CCNC: 121 U/L (ref 40–150)
ALT SERPL W P-5'-P-CCNC: 29 U/L (ref 0–70)
ANION GAP SERPL CALCULATED.3IONS-SCNC: 7 MMOL/L (ref 3–14)
APTT PPP: 37 SEC (ref 22–37)
AST SERPL W P-5'-P-CCNC: 39 U/L (ref 0–45)
BILIRUB SERPL-MCNC: 0.4 MG/DL (ref 0.2–1.3)
BUN SERPL-MCNC: 71 MG/DL (ref 7–30)
CALCIUM SERPL-MCNC: 8.4 MG/DL (ref 8.5–10.1)
CHLORIDE SERPL-SCNC: 101 MMOL/L (ref 94–109)
CO2 SERPL-SCNC: 29 MMOL/L (ref 20–32)
CREAT SERPL-MCNC: 2.31 MG/DL (ref 0.66–1.25)
ERYTHROCYTE [DISTWIDTH] IN BLOOD BY AUTOMATED COUNT: 22.5 % (ref 10–15)
GFR SERPL CREATININE-BSD FRML MDRD: 28 ML/MIN/{1.73_M2}
GLUCOSE BLDC GLUCOMTR-MCNC: 108 MG/DL (ref 70–99)
GLUCOSE BLDC GLUCOMTR-MCNC: 123 MG/DL (ref 70–99)
GLUCOSE BLDC GLUCOMTR-MCNC: 131 MG/DL (ref 70–99)
GLUCOSE BLDC GLUCOMTR-MCNC: 205 MG/DL (ref 70–99)
GLUCOSE SERPL-MCNC: 119 MG/DL (ref 70–99)
HCT VFR BLD AUTO: 28.8 % (ref 40–53)
HGB BLD-MCNC: 8.7 G/DL (ref 13.3–17.7)
INR PPP: 2.53 (ref 0.86–1.14)
INTERPRETATION ECG - MUSE: NORMAL
LDH SERPL L TO P-CCNC: 283 U/L (ref 85–227)
MAGNESIUM SERPL-MCNC: 1.6 MG/DL (ref 1.6–2.3)
MCH RBC QN AUTO: 25.6 PG (ref 26.5–33)
MCHC RBC AUTO-ENTMCNC: 30.2 G/DL (ref 31.5–36.5)
MCV RBC AUTO: 85 FL (ref 78–100)
PLATELET # BLD AUTO: 275 10E9/L (ref 150–450)
POTASSIUM SERPL-SCNC: 3.6 MMOL/L (ref 3.4–5.3)
PROT SERPL-MCNC: 7.7 G/DL (ref 6.8–8.8)
RBC # BLD AUTO: 3.4 10E12/L (ref 4.4–5.9)
SODIUM SERPL-SCNC: 137 MMOL/L (ref 133–144)
WBC # BLD AUTO: 6.3 10E9/L (ref 4–11)

## 2021-03-15 PROCEDURE — 83735 ASSAY OF MAGNESIUM: CPT | Performed by: INTERNAL MEDICINE

## 2021-03-15 PROCEDURE — 71045 X-RAY EXAM CHEST 1 VIEW: CPT

## 2021-03-15 PROCEDURE — 80053 COMPREHEN METABOLIC PANEL: CPT | Performed by: INTERNAL MEDICINE

## 2021-03-15 PROCEDURE — 250N000013 HC RX MED GY IP 250 OP 250 PS 637: Performed by: NURSE PRACTITIONER

## 2021-03-15 PROCEDURE — 214N000001 HC R&B CCU UMMC

## 2021-03-15 PROCEDURE — 85027 COMPLETE CBC AUTOMATED: CPT | Performed by: INTERNAL MEDICINE

## 2021-03-15 PROCEDURE — 97110 THERAPEUTIC EXERCISES: CPT | Mod: GO | Performed by: OCCUPATIONAL THERAPIST

## 2021-03-15 PROCEDURE — 71045 X-RAY EXAM CHEST 1 VIEW: CPT | Mod: 26 | Performed by: RADIOLOGY

## 2021-03-15 PROCEDURE — 93005 ELECTROCARDIOGRAM TRACING: CPT

## 2021-03-15 PROCEDURE — 250N000013 HC RX MED GY IP 250 OP 250 PS 637: Performed by: STUDENT IN AN ORGANIZED HEALTH CARE EDUCATION/TRAINING PROGRAM

## 2021-03-15 PROCEDURE — 99232 SBSQ HOSP IP/OBS MODERATE 35: CPT | Mod: 25 | Performed by: NURSE PRACTITIONER

## 2021-03-15 PROCEDURE — 93010 ELECTROCARDIOGRAM REPORT: CPT | Performed by: INTERNAL MEDICINE

## 2021-03-15 PROCEDURE — 93750 INTERROGATION VAD IN PERSON: CPT | Performed by: NURSE PRACTITIONER

## 2021-03-15 PROCEDURE — 36592 COLLECT BLOOD FROM PICC: CPT | Performed by: NURSE PRACTITIONER

## 2021-03-15 PROCEDURE — 86769 SARS-COV-2 COVID-19 ANTIBODY: CPT | Performed by: NURSE PRACTITIONER

## 2021-03-15 PROCEDURE — 85610 PROTHROMBIN TIME: CPT | Performed by: INTERNAL MEDICINE

## 2021-03-15 PROCEDURE — 250N000013 HC RX MED GY IP 250 OP 250 PS 637: Performed by: INTERNAL MEDICINE

## 2021-03-15 PROCEDURE — 99231 SBSQ HOSP IP/OBS SF/LOW 25: CPT | Mod: GC | Performed by: INTERNAL MEDICINE

## 2021-03-15 PROCEDURE — 85730 THROMBOPLASTIN TIME PARTIAL: CPT | Performed by: INTERNAL MEDICINE

## 2021-03-15 PROCEDURE — 83615 LACTATE (LD) (LDH) ENZYME: CPT | Performed by: INTERNAL MEDICINE

## 2021-03-15 PROCEDURE — 999N001017 HC STATISTIC GLUCOSE BY METER IP

## 2021-03-15 PROCEDURE — 36592 COLLECT BLOOD FROM PICC: CPT | Performed by: INTERNAL MEDICINE

## 2021-03-15 RX ORDER — HYDRALAZINE HYDROCHLORIDE 100 MG/1
100 TABLET, FILM COATED ORAL EVERY 8 HOURS SCHEDULED
Status: DISCONTINUED | OUTPATIENT
Start: 2021-03-15 | End: 2021-03-17 | Stop reason: HOSPADM

## 2021-03-15 RX ORDER — WARFARIN SODIUM 4 MG/1
4 TABLET ORAL
Status: COMPLETED | OUTPATIENT
Start: 2021-03-15 | End: 2021-03-15

## 2021-03-15 RX ADMIN — BUMETANIDE 3 MG: 1 TABLET ORAL at 08:51

## 2021-03-15 RX ADMIN — POTASSIUM CHLORIDE 40 MEQ: 1.5 POWDER, FOR SOLUTION ORAL at 21:03

## 2021-03-15 RX ADMIN — CEPHALEXIN 500 MG: 500 CAPSULE ORAL at 21:10

## 2021-03-15 RX ADMIN — ALLOPURINOL 100 MG: 100 TABLET ORAL at 08:52

## 2021-03-15 RX ADMIN — Medication 1 CAPSULE: at 08:51

## 2021-03-15 RX ADMIN — FINASTERIDE 5 MG: 5 TABLET, FILM COATED ORAL at 08:51

## 2021-03-15 RX ADMIN — HYDRALAZINE HYDROCHLORIDE 100 MG: 100 TABLET, FILM COATED ORAL at 21:03

## 2021-03-15 RX ADMIN — AMIODARONE HYDROCHLORIDE 200 MG: 200 TABLET ORAL at 08:51

## 2021-03-15 RX ADMIN — TAMSULOSIN HYDROCHLORIDE 0.4 MG: 0.4 CAPSULE ORAL at 08:51

## 2021-03-15 RX ADMIN — WARFARIN SODIUM 4 MG: 4 TABLET ORAL at 17:15

## 2021-03-15 RX ADMIN — OMEPRAZOLE 20 MG: 20 CAPSULE, DELAYED RELEASE ORAL at 08:52

## 2021-03-15 RX ADMIN — HYDRALAZINE HYDROCHLORIDE 125 MG: 100 TABLET, FILM COATED ORAL at 05:53

## 2021-03-15 RX ADMIN — HYDRALAZINE HYDROCHLORIDE 100 MG: 100 TABLET, FILM COATED ORAL at 13:25

## 2021-03-15 RX ADMIN — CEPHALEXIN 500 MG: 500 CAPSULE ORAL at 08:52

## 2021-03-15 RX ADMIN — FLUOXETINE 30 MG: 20 CAPSULE ORAL at 08:52

## 2021-03-15 RX ADMIN — ZOLPIDEM TARTRATE 5 MG: 5 TABLET, FILM COATED ORAL at 21:03

## 2021-03-15 RX ADMIN — POTASSIUM CHLORIDE 40 MEQ: 1.5 POWDER, FOR SOLUTION ORAL at 08:50

## 2021-03-15 RX ADMIN — ASPIRIN 81 MG CHEWABLE TABLET 81 MG: 81 TABLET CHEWABLE at 08:53

## 2021-03-15 ASSESSMENT — ACTIVITIES OF DAILY LIVING (ADL)
ADLS_ACUITY_SCORE: 10

## 2021-03-15 NOTE — CONSULTS
Interventional Radiology Consult Service Note    Patient is pended to IR schedule 3/17 for removal of his TCVC.   Labs WNL for procedure. INR 2.53 on 3/15.    No NPO required.    Consent will be done prior to procedure.     Please contact the IR charge RN at [phone] for estimated time of procedure.     Case discussed with Antonette Aguiar NP. This is a 65 year old male with NICM (LVEF 15-20%), s/p HM III 2/18/20 (post op c/b retrosternal hematoma with bleeding in the lungs), s/p ICD, h/o MSSA driveline infection and bacteremia 11/5/20 on prophylactic cephalexin, h/o VT on amiodarone, nonobstructive CAD, severe MR, atrial fibrillation on warfarin, hypertension, ABHINAV requiring CPAP, CKD IV, DMII, HLP,  h/o HIT who presented  with mixed cardiogenic and distributive shock with an intermittent WCT. Initially requiring vasopressors and inotropes, intubated for hypoxemia. Treated for legionella pneumonia. Stabilized on low dose dobutamine off of pressors. Extubated. Transitioned to iHD with improving renal status, no HD since 3/10. IR placed TCVC for dislodged NTCVC on 2.23 and now has been consulted to remove TCVC 3/17 pending continued renal recovery status. IR will plan for removal 3/17 early morning if schedule permits at the discretion of Cardiology.       Expected date of discharge: 3/17.    Vitals:   /89 (BP Location: Left arm)   Pulse 93   Temp 97.8  F (36.6  C) (Oral)   Resp 16   Wt 87.4 kg (192 lb 11.2 oz)   SpO2 97%   BMI 30.18 kg/m      Pertinent Labs:     Lab Results   Component Value Date    WBC 6.3 03/15/2021    WBC 5.9 03/14/2021    WBC 5.8 03/13/2021       Lab Results   Component Value Date    HGB 8.7 03/15/2021    HGB 8.5 03/14/2021    HGB 8.4 03/13/2021       Lab Results   Component Value Date     03/15/2021     03/14/2021     03/13/2021       Lab Results   Component Value Date    INR 2.53 (H) 03/15/2021    PTT 37 03/15/2021       Lab Results   Component Value Date     POTASSIUM 3.6 03/15/2021        JUAN Carlson CNP  Interventional Radiology  Pager: [phone]    *Cardiology contacted IR 3/15 in the evening and requested TCVC be removed 3/16. Line removal was moved up on the IR schedule. JUAN Maldonado

## 2021-03-15 NOTE — PLAN OF CARE
D: Patient presented 2/15 w/mixed cardiogenic and distributive shock with intermittent wide complex tachycardia, initially requiring vasopressors and inotropes, intubated for hypoxemia and treated for legionella pneumonia. Hosptial course further prolonged by persistent oliguric renal failure now requiring dialysis. Hx. NICM s/p HM III 2/18/2020 (post op complicated by retrosternal hematoma with bleeding in the lungs), s/p ICD placed on 3/16/2020, h/o MSSA driveline infection and bacteremia 11/5/2020 on prophylactic cephalexin, h/o VT on amiodarone, moderate nonobstructive CAD, severe MR, atrial fibrillation on warfarin, hypertension, ABHINAV requiring CPAP, CKD IV, DMII, HLP,  h/o HIT.  I/A: A&Ox4. Kenaitze. VSSA on RA/home Cpap overnight. SR 60s-90s (V-paced <40). LVAD #s wnl, no alarms. Denies pain. BG stable this AM. Adequate uop. Up ad tiffanie. Appeared to rest comfortably overnight.   P: Continue to monitor and notify Cards 2 with questions/concerns.

## 2021-03-15 NOTE — PROGRESS NOTES
[facility]   Cardiology II Service / Advanced Heart Failure  Daily Progress Note      Patient: Eliseo Tanner  MRN: [id number]  Admission Date: 2/15/2021  Hospital Day # 28    Assessment and Plan: Eliseo Tanner is a 65 year old male with NICM (LVEF 15-20%), s/p HM III 2/18/20 (post op c/b retrosternal hematoma with bleeding in the lungs), s/p ICD, h/o MSSA driveline infection and bacteremia 11/5/20 on prophylactic cephalexin, h/o VT on amiodarone, nonobstructive CAD, severe MR, atrial fibrillation on warfarin, hypertension, ABHINAV requiring CPAP, CKD IV, DMII, HLP,  h/o HIT who presented  with mixed cardiogenic and distributive shock with an intermittent WCT. Initially requiring vasopressors and inotropes, intubated for hypoxemia. Treated for legionella pneumonia. Stabilized on low dose dobutamine off of pressors. Extubated. Transitioned to iHD with improving renal status, no HD since 3/10.     Today's Plan:  -monitor renal function, hopefully discontinue tunneled line in the coming days  -decrease hydralazine to 100 mg tid to allow higher MAP  --goal MAP 65-85 generally in LVAD patient, we do not follow sBP in non pulsatile patients/not clinically significant    # Mixed cardiogenic and septic shock, resolved  # Multiorgan failure (kidney, liver, respiratory)  # Acute respiratory failure, resolved  # Legionella PNA  Presented to OSH with shortness of breath, dizziness, found to be febrile with WCT. CT chest showing bilateral infiltrates. CAP vs. possible recurrent of MSSA bacteremia. WBC 24.5, Procal 7.95, , Lactacte 6.9. Completed IV antibiotics )cefazolin ended 3/1 and azithromycin ended 2/25). Felt to have some component of cardiogenic shock as well. LDH marked elevated 8531 of unclear significant. Developed worsening renal and liver function c/w shock. Required vasopressors and slow wean of dobutamine (off 3/12). Legionella antigen positive. Required iHD, last run 3/10. Now  extubated and stable on the floor, on RA without respiratory symptoms.   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 NYHA Class III.    -Fluid status: euvolemic, Cr improved on Bumex 3 mg daily  -ACEi/ARB/ARNI: lisinopril stopped due to renal failure  -Afterload reduction: decrease hydralazine to 100 mg tid to allow better renal perfusion   -BB: deferred due to recent shock  -Aldosterone antagonist: contraindicated due to renal dysfunction  -SCD prophylaxis: ICD  -MAP: goal MAP 65-85  -LDH trends: stable high 200s, stop checking daily  -Anticoagulation: warfarin INR goal 2-3, today 2.5  -Antiplatelet: aspirin 81 mg daily  -Continue pta Keflex for chronic drive line infection    # Acute on chronic CKD stage IV 2/2 shock  Prior baseline Cr 1.6-2.2. Cr peaked at 7.1 in the setting of mixed shock as above. Improving this week, last HD run 3/10. Making >2 L urine per day, K stable and he is euvolemic. Still has tunneled line.  -renal following  -requesting higher MAPs, hydralazine decreased  -Bumex 3 mg daily for now  -plan to remove tunneled line prior to d.c     # WCT, atrial flutter versus atrial fib versus VT  # Hx of atrial fibrillation s/p DCCV, history of atrial flutter  WCT hr 140s on admission when febrile. Did not respond to adenosine, c/f VT. Underlying atrial flutter with 3:1. S/p CHAMP and DCCV, in SR currently.   -continue pta amiodarone  -monitor tele, keep K>4 Mg>2    # Chronic microcytic/hypochromic anemia (likely iron deficiency)  # Coagulopathy related to sepsis, resolved  # IgG Kappa monoclonal Gammopathy of undetermined significance  - monitor Hgb (baseline 8-9, 8.7 today)  - transfuse < 7  - follows with [facility] and [facility]  Oncology (Dr. Ibarra)    # Previous Concern For HIT  On previous hospitalization for LVAD placement (02/06/20-03/20/20), concern for HIT. Platelets 250K --> ~ 90K wuth documented history of exposure to unfractionate heparin products at OSH prior to his installation at the [facility].     At that time, patient underwent two HIT panel tests (first one was negative and second antibody test was positive). No known thrombosis events. DAVINA antibody was negative raising question about validity of diagnosis . Per hematology at the time (Dr. James), no other cause for isolated thrombocytopenia. Immediate recovery of plts following d/c of heparin. 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    - avoiding heparin products  - continue warfarin    # Type II DM  A1C 6.8 1/6/21  - holding pta long acting insulin, BS all <140  - Medium sliding scale insulin  - Hypoglycemia Protocol     # BPH: continue pta flomax and finesteride  # GERD: continue pta Prilosec  # Gout: continue pta allopurinol  # Mood disorder: continue pta Prozac    FEN: 2G Na  PROPHY:  Warfarin, PPI  LINES:  Tunneled HD line, PICC  DISPO:  Pending, hopefully discharge midweek  CODE STATUS:  Full code    Siena Aguiar DNP, NP-C  Advanced Heart Failure/Cardiology II Service  Pager [phone] [facility] [id number]    ================================================================    Subjective/24-Hr Events:   Last 24 hr care team notes reviewed. No overnight events. Feeling well. Denies orthopnea, PND, LE edema. Made 2.6L urine yesterday. Tolerating therapies.,     ROS:  4 point ROS including respiratory, CV, GI and  (other than that noted in the HPI) is negative.     Medications: Reviewed in EPIC.     Physical Exam:   BP 91/75 (BP Location: Left arm)   Pulse 65   Temp 97.5  F (36.4  C) (Oral)   Resp 14   Wt 87.4 kg (192 lb 11.2 oz)   SpO2 97%   BMI 30.18 kg/m      GENERAL: Appears comfortable, in no distress.  HEENT: Eye symmetrical, no discharge or icterus  bilaterally. Mucous membranes moist and without lesions.  NECK: Supple, JVD not visible at 90 degrees.   CV: +mechanical LVAD hum  RESPIRATORY: Respirations regular, even, and unlabored. Lungs CTA throughout.    GI: Soft, obese and non distended with normoactive bowel sounds present in all quadrants. No tenderness, rebound, guarding.   EXTREMITIES: No peripheral edema. Non pulsatile.   NEUROLOGIC: Alert and oriented x 3. No focal deficits.   MUSCULOSKELETAL: No joint swelling or tenderness.   SKIN: No jaundice. No rashes or lesions.     Labs:  CMP  Recent Labs   Lab 03/15/21  0551 03/14/21  1720 03/14/21  0459 03/13/21  1803 03/13/21  0440 03/12/21  0537 03/12/21  0537    134 133 134 136   < > 136   POTASSIUM 3.6 3.4 3.3* 3.4 3.3*   < > 3.5   CHLORIDE 101 96 98 97 100   < > 100   CO2 29 30 29 29 29   < > 27   ANIONGAP 7 7 7 8 8   < > 8   * 95 107* 96 101*   < > 105*   BUN 71* 68* 67* 64* 60*   < > 58*   CR 2.31* 2.36* 2.59* 2.45* 2.64*   < > 2.76*   GFRESTIMATED 28* 27* 24* 26* 24*   < > 23*   GFRESTBLACK 32* 32* 28* 30* 28*   < > 26*   CALOS 8.4* 8.3* 8.4* 8.3* 8.0*   < > 8.2*   MAG 1.6  --  1.9  --  1.6  --  1.6   PROTTOTAL 7.7 8.2 7.6 8.3 7.4   < > 7.3   ALBUMIN 2.9* 3.3* 3.1* 3.3* 2.9*   < > 2.9*   BILITOTAL 0.4 0.4 0.4 0.4 0.4   < > 0.4   ALKPHOS 121 128 122 134 120   < > 114   AST 39 42 42 45 35   < > 44   ALT 29 32 31 34 29   < > 28    < > = values in this interval not displayed.       CBC  Recent Labs   Lab 03/15/21  0551 03/14/21  0459 03/13/21  0440 03/12/21  0610   WBC 6.3 5.9 5.8 5.2   RBC 3.40* 3.44* 3.27* 3.22*   HGB 8.7* 8.5* 8.4* 8.1*   HCT 28.8* 28.5* 27.4* 26.0*   MCV 85 83 84 81   MCH 25.6* 24.7* 25.7* 25.2*   MCHC 30.2* 29.8* 30.7* 31.2*   RDW 22.5* 22.2* 22.3* 21.9*    259 257 224       INR  Recent Labs   Lab 03/15/21  0551 03/14/21  0459 03/13/21  0440 03/12/21  0537   INR 2.53* 2.53* 2.45* 2.59*       Time/Communication  I personally spent a total of 25 minutes. Of that 15  minutes was counseling/coordination of patient's care. Plan of care discussed with patient. See my note above for details.    Patient discussed with Dr. Tomas.

## 2021-03-15 NOTE — PROGRESS NOTES
Nephrology Progress Note  03/15/2021         Assessment & Recommendations:   64 yo M with PMHx  NICM  s/p HM III , VT, severe MR, atrial fibrillation on warfarin, hypertension, CKD IV, DMII, HIT presented to OSH with fevers and cough in the setting of syncopal episode transferred to [facility] for further cares. Hospitalization complicated by Afib with RVR with hypotension and intubation with upgrade of cares to the ICU. Found to be in septic shock 2/2 legionella pneumonia with possible cardiogenic shock. Nephrology was consulted for evaluation and management of anuric BERNARDA. Started on RRT 2/16 and continued to be on dialysis, but now making good amount of urine on Bumex.      BERNARDA 2/2 ischemic ATN, now improving   Baseline Cr last yr s/p LVAD placement was around 1. On admission ~2.3 and doubled within 24 hours with rapid decrease in UOP. UA with baseline proteinuria and new granular casts. Initiated on RRT 2/16. Pt remained on dialysis, last HD session was on 3/10, but now making good amount of urine with down trending creatinine.  - will assess for any renal recovery every day  - no acute indication for HD today based on labs or exam  - diuresis per cardiology, pt seem to be euvolemic on exam.  - If he is not dialyzing on Wednesday (with good renal clearance and UOP) will consider to remove his perm cath.  - Avoid nephrotoxins as able  - Renally dose meds  - Monitor I/Os     Volume status  Euvolemic  - Daily weights, Is/Os  - Diuresis as above     Electrolytes, stable     Acid/base - stable   Anemia - Hb ~8, stable. can start him on oral iron      Recommendations were communicated to primary team via this note     Seen and discussed with Dr. Alicia Mabry MD   [phone]    Interval History :   Nursing and provider notes from last 24 hours reviewed.  In the last 24 hours Eliseo Cunninghamse been stable. No new symptoms this morning.    Physical Exam:   I/O last 3 completed shifts:  In: 990 [P.O.:970;  I.V.:20]  Out: 2780 [Urine:2780]   /89 (BP Location: Left arm)   Pulse 93   Temp 97.8  F (36.6  C) (Oral)   Resp 16   Wt 87.4 kg (192 lb 11.2 oz)   SpO2 97%   BMI 30.18 kg/m       General : Pt awake, not in acute distress   Lungs : anterior lung fields are clear  Cardiac : LVAD  Abdomen : Soft/ND/NT  LE : no Edema noted  Dialysis Access : right perm cath    Labs:   All labs reviewed by me  Electrolytes/Renal -   Recent Labs   Lab Test 03/15/21  0551 03/14/21  1720 03/14/21 0459 03/13/21  0440 03/13/21  0440 02/22/21  1539 02/22/21  1539 02/22/21  0353 02/22/21  0353 02/21/21  1617 02/21/21  1617    134 133   < > 136   < >  --    < > 136   < >  --    POTASSIUM 3.6 3.4 3.3*   < > 3.3*   < >  --    < > 4.7   < >  --    CHLORIDE 101 96 98   < > 100   < >  --    < > 106   < >  --    CO2 29 30 29   < > 29   < >  --    < > 24   < >  --    BUN 71* 68* 67*   < > 60*   < >  --    < > 30   < >  --    CR 2.31* 2.36* 2.59*   < > 2.64*   < >  --    < > 1.94*   < >  --    * 95 107*   < > 101*   < >  --    < > 109*   < >  --    CALOS 8.4* 8.3* 8.4*   < > 8.0*   < >  --    < > 8.1*   < >  --    MAG 1.6  --  1.9  --  1.6   < > 2.6*  --  2.5*  --  2.6*   PHOS  --   --   --   --   --   --  5.2*  --  4.4  --  4.5    < > = values in this interval not displayed.       CBC -   Recent Labs   Lab Test 03/15/21  0551 03/14/21 0459 03/13/21 0440   WBC 6.3 5.9 5.8   HGB 8.7* 8.5* 8.4*    259 257       LFTs -   Recent Labs   Lab Test 03/15/21  0551 03/14/21  1720 03/14/21  0459   ALKPHOS 121 128 122   BILITOTAL 0.4 0.4 0.4   ALT 29 32 31   AST 39 42 42   PROTTOTAL 7.7 8.2 7.6   ALBUMIN 2.9* 3.3* 3.1*       Iron Panel -   Recent Labs   Lab Test 02/27/21  0415 11/16/20  0616 02/08/20  0408   IRON 28* 16* 25*   IRONSAT 9* 6* 8*   HEAVENLY 276 75 189     Current Medications:    allopurinol  100 mg Oral or Feeding Tube Daily     amiodarone  200 mg Oral Daily     aspirin  81 mg Oral Daily     bumetanide  3 mg Oral Daily      cephALEXin  500 mg Oral Q12H BRITTANI     finasteride  5 mg Oral Daily     FLUoxetine  30 mg Oral Daily     hydrALAZINE  100 mg Oral Q8H BRITTANI     insulin aspart  1-7 Units Subcutaneous TID AC     insulin aspart  1-5 Units Subcutaneous At Bedtime     melatonin  3 mg Oral At Bedtime     multivitamin RENAL  1 capsule Oral Daily     omeprazole  20 mg Oral QAM AC     potassium chloride  40 mEq Oral BID     senna-docusate  2 tablet Oral BID     tamsulosin  0.4 mg Oral Daily     warfarin ANTICOAGULANT  4 mg Oral ONCE at 18:00       dextrose       dextrose Stopped (02/17/21 1600)     Warfarin Therapy Reminder       Pao Mabry MD

## 2021-03-15 NOTE — PROCEDURES
The patient's HeartMate LVAD was interrogated 3/15/2021  * Speed 5500 rpm   * Pulsatility index 3.5   * Power 4.3 Driver   * Flow 4.3-4.5 L/minute   Fluid status: euvolemic   Alarms were reviewed, and notable for no events.   The driveline exit site was inspected, dressing CDI.   All external components were inspected and showed no evidence of damage or malfunction, none replaced.   No changes to VAD settings made

## 2021-03-15 NOTE — PROGRESS NOTES
"CLINICAL NUTRITION SERVICES - REASSESSMENT NOTE     Nutrition Prescription    RECOMMENDATIONS FOR MDs/PROVIDERS TO ORDER:  None at this time.     Malnutrition Status:    Patient does not meet two of the established criteria necessary for diagnosing malnutrition    Recommendations already ordered by Registered Dietitian (RD):  None additionally     Future/Additional Recommendations:  1. Continue current diet, as ordered. Monitor potential need for K+ or phos restrictions. Diet restrictions may be added as a \"Combination Diet.\"   2. Order a phos binder if needed. Check phos lab. Phos of 5.2 on 2/22/21. May need to modify Boost Plus to Boost Glucose Control if phos remains elevated.   3. Continue nephrocaps, as ordered, since pt is on dialysis. Modify to a multivitamin with minerals if no longer requiring dialysis.   4. Monitor BG control. DM2. Hgb A1c of 6.8 on 1/6/21 and BG was 123 on 3/15.   5. Monitor iron status.     EVALUATION OF THE PROGRESS TOWARD GOALS   Diet: 2 g Sodium since 3/3. Ordered to receive Boost Plus, strawberry at 14:00 and vanilla at HS.   Intake: Tolerating diet. Good appetite and consuming 100% per % intake flowsheets. Per discussion with pt this morning (3/15), he is eating well. [employer] (meal ordering system) indicates food/beverages sent 3/13-3/14 totaled 2372 kcals and 107 g protein daily on average. This meets estimated needs below. Good appetite noted per RN.      NEW FINDINGS   GI: Pt having zero to one stool per day. Formed, brown stools. Last stool on 3/14. On scheduled senna, often held.   Wt Hx: 88.7 kg (4/2/20), 93 kg (5/19/20), 95.3 kg (11/18/20), 96.2 kg (12/17/20), 94.6 kg (1/8/21), 100.3 kg (2/16/21, admit), 95.3 kg (2/24), 96.3 kg (3/3), 89.3 kg (3/9), 87.4 kg (3/15) - Difficult to assess wt changes due to fluid status changes, diuresing, and received dialysis this admission (last dialysis so far on 3/10).     ASSESSED NUTRITION NEEDS (updated)  Dosing Weight: 72 kg " (adjusted, based on lowest wt this admission so far this admit of 87.4 kg on 3/15/21)   Estimated Energy Needs: 2216-4653 kcals/day (30-35 kcals/kg)  Justification Estimated needs on dialysis, but rec the low end of this range due to wt status  Estimated Protein Needs:  grams protein/day (1.2-1.8 grams of pro/kg)  Justification: Increased needs on dialysis  Estimated Protein Needs: ~5655-8221 mL/day  Justification: Estimated needs on dialysis, pending fluid status and UOP    MALNUTRITION  % Intake: Not meeting this criteria.     % Weight Loss: Difficult to assess wt changes due to fluid status changes, diuresing, and now on dialysis.    Subcutaneous Fat Loss: None observed  Muscle Loss: None observed  Fluid Accumulation/Edema: Does not meet criteria  Malnutrition Diagnosis: Patient does not meet two of the established criteria necessary for diagnosing malnutrition    Previous Goals   Patient to consume % of nutritionally adequate meal trays TID, or the equivalent with supplements/snacks.  Evaluation: Meeting.    Previous Nutrition Diagnosis  Inadequate energy intake related to NPO at times for tests/procedures, LOS, food choices, and diet order as evidenced by [employer] (meal ordering system) indicates food/beverages sent 3/6-3/9 totaled 1826 kcals which does not meet estimated needs of 0749-2025 kcals/day (30-35 kcals/kg).  Evaluation: Resolved. Changed to new nutrition dx below.     CURRENT NUTRITION DIAGNOSIS  Predicted inadequate nutrient intake (protein-energy) related to LOS, food choices, and diet order.    INTERVENTIONS  Implementation  None additionally at this time.     Goals  Patient to consume % of nutritionally adequate meal trays TID, or the equivalent with supplements/snacks.    Monitoring/Evaluation  Progress toward goals will be monitored and evaluated per protocol.     Nutrition will continue to follow.      Abby Woods, MS, RD, LD, [facility]   6C Pgr: [phone]

## 2021-03-15 NOTE — PROVIDER NOTIFICATION
Pt's HR back in the 40's, SB/junctiona.l /69. Asymptomatic. Cards 2 notified. Continue to monitor.

## 2021-03-15 NOTE — PLAN OF CARE
Admitted 2/15 from an outside hospital with mixed cardiogenic and distributive shock, BERNARDA, and legionella pneumonia. Pneumonia now resolved. Medical history includes: NICM (LVEF 15-20%) s/p HM3 (02/2020) and ICD (03/2020), MSSA driveline infection and bacteremia, VT on amiodarone, nonobstructive CAD, severe MR, atrial fibrillation, HTN, ABHINAV on home CPAP, CKD IV, DM2, HIT. VSS, sinus rhythm with occasional pacing, denies pain, on room air with clear lung sounds. Garland bath done, VAD dressing changed / alarms in place and functional. No remarkable VAD numbers. Patient voiding well, creatinine trending at patient's baseline. Left eyelid bruised, unknown cause Cards 2 informed. Continuing to monitor.

## 2021-03-15 NOTE — PROVIDER NOTIFICATION
Received call from monitor technician that pt's HR dropped into the 40's. Pt asymptomatic. HR 50's-60's on assessment. Appears junctional. Discussed with Dr. Guajardo. Obtained order for 12 lead EKG. Pt briefly went back into SR in the 70's, but now is junctional at 59. Continue to monitor.

## 2021-03-15 NOTE — PROGRESS NOTES
LVAD Social Work Services Progress Note      Date of Initial Social Work Evaluation: 2/16/2021  Collaborated with: Cards 2, pt and pt's wife, Veronique, via phone ([phone])    Data: Pt with hx of LVAD implant 2/18/2020. Pt admitted 2/16/2021 from OSH for cardiogenic and septic shock and multiorgan failure. Pt was extubated 2/20. Pt was doing iHD and appears to have regained renal recovery per Cards 2. Writer has been working with [facility] dialysis to arrange outpatient dialysis, but they were needing LVAD teaching, which was not anticipated to be completed until early April. Now pt not anticipated to need outpatient dialysis. Plan is to remove dialysis catheter and discharge on Wed.     Intervention: Discharge Planning   Assessment: Pt and wife are ecstatic about pt not needing dialysis. Pt's wife was already planning on coming on Wed and will now take pt home. Pt nor wife anticipate discharge needs.   Education provided by SW: Ongoing Social Work support   Plan:    Discharge Plans in Progress: Anticipate discharge home on Wed-wife to p/u around 10 that day as they have a 4hr drive home    Barriers to d/c plan: None anticipated     Follow up Plan: SW to continue to follow.

## 2021-03-16 ENCOUNTER — APPOINTMENT (OUTPATIENT)
Dept: INTERVENTIONAL RADIOLOGY/VASCULAR | Facility: CLINIC | Age: 66
DRG: 870 | End: 2021-03-16
Attending: NURSE PRACTITIONER
Payer: MEDICARE

## 2021-03-16 LAB
ANION GAP SERPL CALCULATED.3IONS-SCNC: 7 MMOL/L (ref 3–14)
BUN SERPL-MCNC: 69 MG/DL (ref 7–30)
CALCIUM SERPL-MCNC: 8.2 MG/DL (ref 8.5–10.1)
CHLORIDE SERPL-SCNC: 101 MMOL/L (ref 94–109)
CO2 SERPL-SCNC: 30 MMOL/L (ref 20–32)
CREAT SERPL-MCNC: 2.18 MG/DL (ref 0.66–1.25)
ERYTHROCYTE [DISTWIDTH] IN BLOOD BY AUTOMATED COUNT: 22.5 % (ref 10–15)
GFR SERPL CREATININE-BSD FRML MDRD: 30 ML/MIN/{1.73_M2}
GLUCOSE BLDC GLUCOMTR-MCNC: 108 MG/DL (ref 70–99)
GLUCOSE BLDC GLUCOMTR-MCNC: 113 MG/DL (ref 70–99)
GLUCOSE BLDC GLUCOMTR-MCNC: 167 MG/DL (ref 70–99)
GLUCOSE BLDC GLUCOMTR-MCNC: 89 MG/DL (ref 70–99)
GLUCOSE SERPL-MCNC: 109 MG/DL (ref 70–99)
HCT VFR BLD AUTO: 29 % (ref 40–53)
HGB BLD-MCNC: 8.9 G/DL (ref 13.3–17.7)
INR PPP: 2.44 (ref 0.86–1.14)
INTERPRETATION ECG - MUSE: NORMAL
MAGNESIUM SERPL-MCNC: 1.7 MG/DL (ref 1.6–2.3)
MCH RBC QN AUTO: 26.3 PG (ref 26.5–33)
MCHC RBC AUTO-ENTMCNC: 30.7 G/DL (ref 31.5–36.5)
MCV RBC AUTO: 86 FL (ref 78–100)
PLATELET # BLD AUTO: 266 10E9/L (ref 150–450)
POTASSIUM SERPL-SCNC: 3.8 MMOL/L (ref 3.4–5.3)
RBC # BLD AUTO: 3.39 10E12/L (ref 4.4–5.9)
SARS-COV-2 AB PNL SERPL IA: POSITIVE
SARS-COV-2 IGG SERPL IA-ACNC: NORMAL
SODIUM SERPL-SCNC: 138 MMOL/L (ref 133–144)
WBC # BLD AUTO: 6.5 10E9/L (ref 4–11)

## 2021-03-16 PROCEDURE — 250N000013 HC RX MED GY IP 250 OP 250 PS 637: Performed by: STUDENT IN AN ORGANIZED HEALTH CARE EDUCATION/TRAINING PROGRAM

## 2021-03-16 PROCEDURE — 0JPTXXZ REMOVAL OF TUNNELED VASCULAR ACCESS DEVICE FROM TRUNK SUBCUTANEOUS TISSUE AND FASCIA, EXTERNAL APPROACH: ICD-10-PCS | Performed by: PHYSICIAN ASSISTANT

## 2021-03-16 PROCEDURE — 80048 BASIC METABOLIC PNL TOTAL CA: CPT | Performed by: INTERNAL MEDICINE

## 2021-03-16 PROCEDURE — 36589 REMOVAL TUNNELED CV CATH: CPT | Performed by: PHYSICIAN ASSISTANT

## 2021-03-16 PROCEDURE — 999N001017 HC STATISTIC GLUCOSE BY METER IP

## 2021-03-16 PROCEDURE — 250N000009 HC RX 250: Performed by: PHYSICIAN ASSISTANT

## 2021-03-16 PROCEDURE — 36589 REMOVAL TUNNELED CV CATH: CPT

## 2021-03-16 PROCEDURE — 85610 PROTHROMBIN TIME: CPT | Performed by: INTERNAL MEDICINE

## 2021-03-16 PROCEDURE — 85027 COMPLETE CBC AUTOMATED: CPT | Performed by: INTERNAL MEDICINE

## 2021-03-16 PROCEDURE — 36415 COLL VENOUS BLD VENIPUNCTURE: CPT | Performed by: INTERNAL MEDICINE

## 2021-03-16 PROCEDURE — 250N000013 HC RX MED GY IP 250 OP 250 PS 637: Performed by: INTERNAL MEDICINE

## 2021-03-16 PROCEDURE — 83735 ASSAY OF MAGNESIUM: CPT | Performed by: INTERNAL MEDICINE

## 2021-03-16 PROCEDURE — 93750 INTERROGATION VAD IN PERSON: CPT | Performed by: NURSE PRACTITIONER

## 2021-03-16 PROCEDURE — 250N000013 HC RX MED GY IP 250 OP 250 PS 637: Performed by: NURSE PRACTITIONER

## 2021-03-16 PROCEDURE — 214N000001 HC R&B CCU UMMC

## 2021-03-16 PROCEDURE — 99232 SBSQ HOSP IP/OBS MODERATE 35: CPT | Mod: 25 | Performed by: NURSE PRACTITIONER

## 2021-03-16 RX ORDER — POTASSIUM CHLORIDE 750 MG/1
40 TABLET, EXTENDED RELEASE ORAL 2 TIMES DAILY
Status: DISCONTINUED | OUTPATIENT
Start: 2021-03-16 | End: 2021-03-17 | Stop reason: HOSPADM

## 2021-03-16 RX ORDER — WARFARIN SODIUM 4 MG/1
4 TABLET ORAL
Status: COMPLETED | OUTPATIENT
Start: 2021-03-16 | End: 2021-03-16

## 2021-03-16 RX ORDER — LIDOCAINE HYDROCHLORIDE 10 MG/ML
1-30 INJECTION, SOLUTION EPIDURAL; INFILTRATION; INTRACAUDAL; PERINEURAL
Status: COMPLETED | OUTPATIENT
Start: 2021-03-16 | End: 2021-03-16

## 2021-03-16 RX ADMIN — ASPIRIN 81 MG CHEWABLE TABLET 81 MG: 81 TABLET CHEWABLE at 08:17

## 2021-03-16 RX ADMIN — FINASTERIDE 5 MG: 5 TABLET, FILM COATED ORAL at 08:19

## 2021-03-16 RX ADMIN — Medication 1 CAPSULE: at 08:20

## 2021-03-16 RX ADMIN — OMEPRAZOLE 20 MG: 20 CAPSULE, DELAYED RELEASE ORAL at 08:21

## 2021-03-16 RX ADMIN — ZOLPIDEM TARTRATE 5 MG: 5 TABLET, FILM COATED ORAL at 21:11

## 2021-03-16 RX ADMIN — HYDRALAZINE HYDROCHLORIDE 100 MG: 100 TABLET, FILM COATED ORAL at 21:11

## 2021-03-16 RX ADMIN — BUMETANIDE 3 MG: 1 TABLET ORAL at 08:20

## 2021-03-16 RX ADMIN — LIDOCAINE HYDROCHLORIDE 6 ML: 10 INJECTION, SOLUTION EPIDURAL; INFILTRATION; INTRACAUDAL; PERINEURAL at 10:43

## 2021-03-16 RX ADMIN — FLUOXETINE 30 MG: 20 CAPSULE ORAL at 08:22

## 2021-03-16 RX ADMIN — WARFARIN SODIUM 4 MG: 4 TABLET ORAL at 17:53

## 2021-03-16 RX ADMIN — ALLOPURINOL 100 MG: 100 TABLET ORAL at 08:21

## 2021-03-16 RX ADMIN — TAMSULOSIN HYDROCHLORIDE 0.4 MG: 0.4 CAPSULE ORAL at 08:20

## 2021-03-16 RX ADMIN — HYDRALAZINE HYDROCHLORIDE 100 MG: 100 TABLET, FILM COATED ORAL at 13:10

## 2021-03-16 RX ADMIN — AMIODARONE HYDROCHLORIDE 200 MG: 200 TABLET ORAL at 08:21

## 2021-03-16 RX ADMIN — HYDRALAZINE HYDROCHLORIDE 100 MG: 100 TABLET, FILM COATED ORAL at 04:34

## 2021-03-16 RX ADMIN — DOCUSATE SODIUM 50 MG AND SENNOSIDES 8.6 MG 1 TABLET: 8.6; 5 TABLET, FILM COATED ORAL at 19:38

## 2021-03-16 RX ADMIN — POTASSIUM CHLORIDE 40 MEQ: 750 TABLET, EXTENDED RELEASE ORAL at 09:41

## 2021-03-16 RX ADMIN — POTASSIUM CHLORIDE 40 MEQ: 750 TABLET, EXTENDED RELEASE ORAL at 19:37

## 2021-03-16 ASSESSMENT — ACTIVITIES OF DAILY LIVING (ADL)
ADLS_ACUITY_SCORE: 10

## 2021-03-16 NOTE — IR NOTE
Patient Name: Eliseo Tanner  Medical Record Number: [id number]  Today's Date: 3/16/2021    Procedure: right tunneled catheter removal  Proceduralist: ALISTAIR Omer PA-C    Sedation Notes: lidocaine used at site    Procedure start time: 1036  Procedure end time: 1048    Report given to: Christen PETERSON   : none    Other Notes: Pt arrived to IR room 7 from . Consent reviewed, pt confirmed. Pt denies any questions or concerns regarding procedure. Pt positioned supine and monitored per protocol. Site cleansed and dressed per protocol. Pt tolerated procedure without any noted complications. Pt transferred back to .

## 2021-03-16 NOTE — PROGRESS NOTES
D: Called patient for routine virtual VAD Coordinator rounding (r/t COVID-19). Plan is for pt to discontinue tomorrow with no HD.  Patient is elated at this news/plan.    I: Discussed POC and provided support and listened to patient and care giver's thoughts and concerns.   P: Continue to follow patient and address any questions or concerns patient and or caregiver may have.

## 2021-03-16 NOTE — PROCEDURES
Eliseo Tanner  [id number]    Completed removal of dual lumen tunneled central venous access catheter via RIGHT chest. Dx: no longer needed. Negra.

## 2021-03-16 NOTE — PLAN OF CARE
D: Patient presented 2/15 w/mixed cardiogenic and distributive shock with intermittent wide complex tachycardia, initially requiring vasopressors and inotropes, intubated for hypoxemia and treated for legionella pneumonia. Hosptial course further prolonged by persistent oliguric renal failure now requiring dialysis. Hx. NICM s/p HM III 2/18/2020 (post op complicated by retrosternal hematoma with bleeding in the lungs), s/p ICD placed on 3/16/2020, h/o MSSA driveline infection and bacteremia 11/5/2020 on prophylactic cephalexin, h/o VT on amiodarone, moderate nonobstructive CAD, severe MR, atrial fibrillation on warfarin, hypertension, ABHINAV requiring CPAP, CKD IV, DMII, HLP,  h/o HIT.     I: Monitored vitals and assessed pt status.   Tele: SR w/ BBB most of night, JR at start of shift  O2: RA, home CPAP at night  Mobility: up ad tiffanie     A: A0x4. Yocha Dehe. L eyelid bruised. VSS. LVAD #'s WNL, no alarms. Afebrile. Urinating adequately. LBM 3/15. Denies pain. AC/HS bg checks. Able to make needs known. Appeared to sleep well overnight.     P: Continue to monitor Pt status and report changes to Cards 2. Per IR note, on IR scheduled to remove TCVC tomorrow 3/17.

## 2021-03-16 NOTE — PROVIDER NOTIFICATION
D: Patient HR dropped to mid 50s while sleeping, difficult to see Pwave.  I: Contacted MT who agrees rhythm appears junctional. Notified cross cover.  A: Patient denies dizziness, lightheadedness. BP Map 74.  P: MD to order 12 lead.

## 2021-03-16 NOTE — PROGRESS NOTES
[facility]   Cardiology II Service / Advanced Heart Failure  Daily Progress Note  Date of Service: 3/16/2021      Patient: Eliseo Tanner  MRN: [id number]  Admission Date: 2/15/2021  Hospital Day # 29    Assessment and Plan: Eliseo Tanner is a 65 year old male with NICM (LVEF 15-20%), s/p HM III 2/18/20 (post op c/b retrosternal hematoma with bleeding in the lungs), s/p ICD, h/o MSSA driveline infection and bacteremia 11/5/20 on prophylactic cephalexin, h/o VT on amiodarone, nonobstructive CAD, severe MR, atrial fibrillation on warfarin, HTN, ABHINAV requiring CPAP, CKD Stage IV, DM Type II, Hyperlipidemia, history of HIT who presented  with mixed cardiogenic and distributive shock with an intermittent wide complex tachycardia. Initially requiring vasopressors and inotropes, intubated for hypoxemia. Treated for legionella pneumonia and since extubated. Transitioned to iHD with improving renal status, no HD since 3/10.     Mixed Cardiogenic and septic shock, resolved. Acute hypoxic respiratory failure secondary to Legionella PNA. Presented in acute hypoxia, febrile with CT chest consistent with bilateral infiltrates. WBC 24.5, Procal 7.95, , Lactacte 6.9. Legionella antigen positive. Completed IV antibiotics )cefazolin ended 3/1 and azithromycin ended 2/25). Component of cardiogenic shock with rise in LDH to 8531.  - Remains stable on room air.     Chronic SCHF s/p HM III LVAD. Elevated LDH in setting of infection, resolved. Implanted 2/18/20.   Stage D, NYHA Class III  ACEi/ARB Lisinopril held in setting of renal failure. Hydralazine 100 mg po TID.   BB contraindicated due to low cardiac output  Aldosterone antagonist contraindicated due to renal dysfunction  SCD prophylaxis ICD  Fluid status euvolemic. bumex 3 mg po daily.   MAP: 89-92. Antihypertensives decreased to improve renal perfusion. Monitor.   LDH: 283 3/15/21  Anticoagulation: Coumadin per pharmacy. INR-2.44, Goal  2-3.  Antiplatelet: ASA 81 mg po daily       The patient's HeartMate LVAD was interrogated 3/16/2021  * Speed 5500 rpm   * Pulsatility index 3.3   * Power 4.6 Driver   * Flow 4.5 L/minute   Fluid status: Euvolemic   Alarms were reviewed, and notable for PI events.   The driveline exit site was covered with dressing clean, dry, and intact.   All external components were inspected and showed no evidence of damage or malfunction.    Acute on Chronic CKD Stave IV secondary to shock. Cr peaked at 7.1. Last HD run 3/10.   - Appreciate Nephrology consult.   - Tunneled line extracted today.   - Follow up with outpatient Nephrology with weekly labs.     Wide Complex Tachycardia. History of Afib s/p DCCV and aflutter. Did not respond to Adenosine on admission. Underlying atrial flutter with 3:1. S/p CHAMP and DCCV, in SR currently.   - Continue Amiodarone.     Chronic microcytic/hypochromic anemia. Coagulopathy related to sepsis, resolved. IgG Kappa monoclonal Gammopathy of undetermined significance  - transfuse < 7. Hbg-8.9.  - follows with [facility] (Dr. 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 Platelets 250K --> ~ 90K with documented history of exposure to unfractionate heparin products at OSH prior to his installation at the [facility]. HIT panel tests at that time with first negative and second antibody test was positive. No known thrombosis events. DAVINA antibody negative. Per Dr. James with hematology no other cause for isolated thrombocytopenia. Immediate recovery of plts following d/c of heparin. 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   - avoiding heparin products  - continue warfarin     DM Type II, controlled.   A1C 6.8 1/6/21  - holding pta long acting insulin, BS all <140  - Medium sliding scale insulin, plan for at discharge.   - Hypoglycemia Protocol      BPH: continue pta flomax and finesteride  GERD: continue pta Prilosec  Gout: continue pta allopurinol  Mood disorder: continue pta  Prozac     FEN: 2 gram sodium diet   PROPHY:  Coumadin  LINES: PIV  DISPO: Anticipate discharge to home in AM   CODE STATUS:  Full Code   ================================================================    Interval History/ROS: He is feeling well today. He notes mild ecchymosis above his left eye unchanged from yesterday and denies vision changes. He denies fever, chills, chest pain, palpitations, cough, nausea, vomiting, diarrhea, hematochezia, melena, and LE edema. He notes improving MIRANDA. He is tolerating oral intake and ambulation. He is sitting up in the chair at this time.     Last 24 hr care team notes reviewed.   ROS:  4 point ROS including Respiratory, CV, GI and , other than that noted in the HPI, is negative.     Medications: Reviewed in EPIC.     Physical Exam:   BP 98/80 (BP Location: Left arm)   Pulse 72   Temp 98.6  F (37  C) (Oral)   Resp 18   Wt 87.1 kg (192 lb)   SpO2 96%   BMI 30.07 kg/m    GENERAL: Appears alert and oriented times three.   HEENT: Eye symmetrical and free of discharge bilaterally. Mucous membranes moist and without lesions.  NECK: Supple and without lymphadenopathy. JVD 10 cm  CV: RRR, S1S2 present with LVAD hum.   RESPIRATORY: Respirations regular, even, and unlabored. Lungs CTA throughout.   GI: Soft and non distended with normoactive bowel sounds present in all quadrants. No tenderness, rebound, guarding. No organomegaly.   EXTREMITIES: Trace bilateral LE peripheral edema. 2+ bilateral pedal pulses.   NEUROLOGIC: Alert and orientated x 3. CN II-XII grossly intact. No focal deficits.   MUSCULOSKELETAL: No joint swelling or tenderness.   SKIN: No jaundice. No rashes or lesions. LVAD drive line covered.     Data:  CMP  Recent Labs   Lab 03/16/21  0551 03/15/21  0551 03/14/21  1720 03/14/21  0459 03/13/21  1803 03/13/21  0440    137 134 133 134 136   POTASSIUM 3.8 3.6 3.4 3.3* 3.4 3.3*   CHLORIDE 101 101 96 98 97 100   CO2 30 29 30 29 29 29   ANIONGAP 7 7 7 7 8 8   GLC  109* 119* 95 107* 96 101*   BUN 69* 71* 68* 67* 64* 60*   CR 2.18* 2.31* 2.36* 2.59* 2.45* 2.64*   GFRESTIMATED 30* 28* 27* 24* 26* 24*   GFRESTBLACK 35* 32* 32* 28* 30* 28*   CALOS 8.2* 8.4* 8.3* 8.4* 8.3* 8.0*   MAG 1.7 1.6  --  1.9  --  1.6   PROTTOTAL  --  7.7 8.2 7.6 8.3 7.4   ALBUMIN  --  2.9* 3.3* 3.1* 3.3* 2.9*   BILITOTAL  --  0.4 0.4 0.4 0.4 0.4   ALKPHOS  --  121 128 122 134 120   AST  --  39 42 42 45 35   ALT  --  29 32 31 34 29     CBC  Recent Labs   Lab 03/16/21  0551 03/15/21  0551 03/14/21  0459 03/13/21  0440   WBC 6.5 6.3 5.9 5.8   RBC 3.39* 3.40* 3.44* 3.27*   HGB 8.9* 8.7* 8.5* 8.4*   HCT 29.0* 28.8* 28.5* 27.4*   MCV 86 85 83 84   MCH 26.3* 25.6* 24.7* 25.7*   MCHC 30.7* 30.2* 29.8* 30.7*   RDW 22.5* 22.5* 22.2* 22.3*    275 259 257     INR  Recent Labs   Lab 03/16/21  0551 03/15/21  0551 03/14/21  0459 03/13/21  0440   INR 2.44* 2.53* 2.53* 2.45*       Patient discussed with Dr. Tomas.      Laine Leon [facility]  3/16/2021

## 2021-03-16 NOTE — PLAN OF CARE
D: Patient was admitted on 3/15/2021 for tachyarrhythmias. He has a PMHx of   NICM (LVEF 15-20%), s/p HM III 2/18/20 (post op c/b retrosternal hematoma with bleeding in the lungs), s/p ICD, h/o MSSA driveline infection and bacteremia 11/5/20 on prophylactic cephalexin, h/o VT on amiodarone, nonobstructive CAD, severe MR, atrial fibrillation on warfarin, HTN, ABHINAV requiring CPAP, CKD Stage IV, DM Type II, Hyperlipidemia, history of HIT who presented  with mixed cardiogenic and distributive shock with an intermittent wide complex tachycardia. Initially requiring vasopressors and inotropes, intubated for hypoxemia. Treated for legionella pneumonia and since extubated. Transitioned to iHD with improving renal status, no HD since 3/10.    I: Monitored vitals and assessed patient status. His HD line was removed today in IR.   Running:R DL PICC  id SL and a R piv    A: Patient's vital signs have been stable, His rhythm was SB/SR 50-70 with a bundle branch block and a long QT, has isolated PVC's and is rarely V-paced at 40.He denies pain     I/O this shift:  In: 1100 [P.O.:1100]  Out: 1800 [Urine:1800]    Temp:  [97.6  F (36.4  C)-98.6  F (37  C)] 98.6  F (37  C)  Pulse:  [52-82] 72  Resp:  [16-20] 18  BP: ()/(67-96) 98/80  SpO2:  [96 %-98 %] 96 %      P: Continue to monitor patient status and report changes to treatment team. Plan is for him to be discharged tomorrow.

## 2021-03-16 NOTE — PROGRESS NOTES
Brief renal note     Mr. Tanner was being followed by nephrology for his BERNARDA on CKD. BERNARDA was presumed 2/2 hemodynamic changes during his LVAD placement and cardiogenic shock. Now in renal recovery with improved UOP and good clearance. Off of HD since 3/10 with continued down trend of creatinine.   - Agree with removing HD line     Nephrology will sign off in setting of improved renal function   Please call with any questions or concerns   Need out pt Nephrology follow up in 2-4 weeks post discharge  PCP follow up in 1-2 weeks to check on his renal function.    D/w Dr. Alicia Mabry MD  Nephrology fellow

## 2021-03-16 NOTE — PLAN OF CARE
Neuro: A&Ox4. [place]'.  Cardiac: Afebrile. SR most of shift, but JR 50s while asleep.   Respiratory: RA during day, home cpap at night.  GI/: Voiding spontaneously in urinal. No BM this shift.   Diet/appetite: Good oral intake. Denies nausea   Activity: Up ad tiffanie/    Pain: . Denies   Skin: No new deficits noted.  Lines: R CVC to be removed tomorrow by IR.  R DL PICC saline locked.

## 2021-03-17 ENCOUNTER — ANTICOAGULATION THERAPY VISIT (OUTPATIENT)
Dept: ANTICOAGULATION | Facility: CLINIC | Age: 66
End: 2021-03-17

## 2021-03-17 ENCOUNTER — PATIENT OUTREACH (OUTPATIENT)
Dept: CARE COORDINATION | Facility: CLINIC | Age: 66
End: 2021-03-17

## 2021-03-17 VITALS
HEART RATE: 77 BPM | OXYGEN SATURATION: 97 % | DIASTOLIC BLOOD PRESSURE: 87 MMHG | SYSTOLIC BLOOD PRESSURE: 122 MMHG | RESPIRATION RATE: 16 BRPM | TEMPERATURE: 97.8 F | WEIGHT: 192.1 LBS | BODY MASS INDEX: 30.09 KG/M2

## 2021-03-17 DIAGNOSIS — I48.0 PAROXYSMAL ATRIAL FIBRILLATION (H): ICD-10-CM

## 2021-03-17 DIAGNOSIS — I50.22 CHRONIC SYSTOLIC CONGESTIVE HEART FAILURE (H): ICD-10-CM

## 2021-03-17 DIAGNOSIS — Z95.811 LVAD (LEFT VENTRICULAR ASSIST DEVICE) PRESENT (H): ICD-10-CM

## 2021-03-17 LAB
ANION GAP SERPL CALCULATED.3IONS-SCNC: 6 MMOL/L (ref 3–14)
BUN SERPL-MCNC: 69 MG/DL (ref 7–30)
CALCIUM SERPL-MCNC: 8.3 MG/DL (ref 8.5–10.1)
CHLORIDE SERPL-SCNC: 102 MMOL/L (ref 94–109)
CO2 SERPL-SCNC: 29 MMOL/L (ref 20–32)
CREAT SERPL-MCNC: 2 MG/DL (ref 0.66–1.25)
ERYTHROCYTE [DISTWIDTH] IN BLOOD BY AUTOMATED COUNT: 22.6 % (ref 10–15)
GFR SERPL CREATININE-BSD FRML MDRD: 33 ML/MIN/{1.73_M2}
GLUCOSE BLDC GLUCOMTR-MCNC: 126 MG/DL (ref 70–99)
GLUCOSE SERPL-MCNC: 119 MG/DL (ref 70–99)
HCT VFR BLD AUTO: 29 % (ref 40–53)
HGB BLD-MCNC: 9 G/DL (ref 13.3–17.7)
INR PPP: 2.34 (ref 0.86–1.14)
MAGNESIUM SERPL-MCNC: 1.5 MG/DL (ref 1.6–2.3)
MCH RBC QN AUTO: 26.2 PG (ref 26.5–33)
MCHC RBC AUTO-ENTMCNC: 31 G/DL (ref 31.5–36.5)
MCV RBC AUTO: 85 FL (ref 78–100)
PLATELET # BLD AUTO: 268 10E9/L (ref 150–450)
POTASSIUM SERPL-SCNC: 3.9 MMOL/L (ref 3.4–5.3)
RBC # BLD AUTO: 3.43 10E12/L (ref 4.4–5.9)
SODIUM SERPL-SCNC: 137 MMOL/L (ref 133–144)
WBC # BLD AUTO: 6.6 10E9/L (ref 4–11)

## 2021-03-17 PROCEDURE — 83735 ASSAY OF MAGNESIUM: CPT | Performed by: INTERNAL MEDICINE

## 2021-03-17 PROCEDURE — 250N000013 HC RX MED GY IP 250 OP 250 PS 637: Performed by: INTERNAL MEDICINE

## 2021-03-17 PROCEDURE — 36415 COLL VENOUS BLD VENIPUNCTURE: CPT | Performed by: INTERNAL MEDICINE

## 2021-03-17 PROCEDURE — 250N000013 HC RX MED GY IP 250 OP 250 PS 637: Performed by: NURSE PRACTITIONER

## 2021-03-17 PROCEDURE — 85027 COMPLETE CBC AUTOMATED: CPT | Performed by: INTERNAL MEDICINE

## 2021-03-17 PROCEDURE — 250N000013 HC RX MED GY IP 250 OP 250 PS 637: Performed by: STUDENT IN AN ORGANIZED HEALTH CARE EDUCATION/TRAINING PROGRAM

## 2021-03-17 PROCEDURE — 999N001017 HC STATISTIC GLUCOSE BY METER IP

## 2021-03-17 PROCEDURE — 80048 BASIC METABOLIC PNL TOTAL CA: CPT | Performed by: INTERNAL MEDICINE

## 2021-03-17 PROCEDURE — 85610 PROTHROMBIN TIME: CPT | Performed by: INTERNAL MEDICINE

## 2021-03-17 PROCEDURE — 99239 HOSP IP/OBS DSCHRG MGMT >30: CPT | Performed by: NURSE PRACTITIONER

## 2021-03-17 RX ORDER — BUMETANIDE 1 MG/1
3 TABLET ORAL DAILY
Qty: 90 TABLET | Refills: 1 | Status: SHIPPED | OUTPATIENT
Start: 2021-03-17 | End: 2021-06-24

## 2021-03-17 RX ORDER — POTASSIUM CHLORIDE 1500 MG/1
40 TABLET, EXTENDED RELEASE ORAL 2 TIMES DAILY
Qty: 120 TABLET | Refills: 1 | Status: SHIPPED | OUTPATIENT
Start: 2021-03-17 | End: 2022-02-11

## 2021-03-17 RX ORDER — MAGNESIUM OXIDE 400 MG/1
400 TABLET ORAL 2 TIMES DAILY
Status: DISCONTINUED | OUTPATIENT
Start: 2021-03-17 | End: 2021-03-17 | Stop reason: HOSPADM

## 2021-03-17 RX ORDER — ATORVASTATIN CALCIUM 20 MG/1
20 TABLET, FILM COATED ORAL DAILY
Qty: 30 TABLET | Refills: 1 | Status: SHIPPED | OUTPATIENT
Start: 2021-03-17 | End: 2021-04-22

## 2021-03-17 RX ORDER — WARFARIN SODIUM 4 MG/1
4 TABLET ORAL
Status: DISCONTINUED | OUTPATIENT
Start: 2021-03-17 | End: 2021-03-17 | Stop reason: HOSPADM

## 2021-03-17 RX ORDER — AMLODIPINE BESYLATE 2.5 MG/1
2.5 TABLET ORAL DAILY
Status: DISCONTINUED | OUTPATIENT
Start: 2021-03-17 | End: 2021-03-17 | Stop reason: HOSPADM

## 2021-03-17 RX ORDER — AMLODIPINE BESYLATE 2.5 MG/1
2.5 TABLET ORAL DAILY
Qty: 30 TABLET | Refills: 1 | Status: SHIPPED | OUTPATIENT
Start: 2021-03-17 | End: 2021-04-15

## 2021-03-17 RX ADMIN — AMLODIPINE BESYLATE 2.5 MG: 2.5 TABLET ORAL at 10:25

## 2021-03-17 RX ADMIN — FLUOXETINE 30 MG: 20 CAPSULE ORAL at 07:43

## 2021-03-17 RX ADMIN — FINASTERIDE 5 MG: 5 TABLET, FILM COATED ORAL at 07:43

## 2021-03-17 RX ADMIN — POTASSIUM CHLORIDE 40 MEQ: 750 TABLET, EXTENDED RELEASE ORAL at 07:43

## 2021-03-17 RX ADMIN — TAMSULOSIN HYDROCHLORIDE 0.4 MG: 0.4 CAPSULE ORAL at 07:44

## 2021-03-17 RX ADMIN — HYDRALAZINE HYDROCHLORIDE 100 MG: 100 TABLET, FILM COATED ORAL at 12:33

## 2021-03-17 RX ADMIN — ALLOPURINOL 100 MG: 100 TABLET ORAL at 07:44

## 2021-03-17 RX ADMIN — ASPIRIN 81 MG CHEWABLE TABLET 81 MG: 81 TABLET CHEWABLE at 07:43

## 2021-03-17 RX ADMIN — BUMETANIDE 3 MG: 1 TABLET ORAL at 07:44

## 2021-03-17 RX ADMIN — HYDRALAZINE HYDROCHLORIDE 100 MG: 100 TABLET, FILM COATED ORAL at 04:18

## 2021-03-17 RX ADMIN — AMIODARONE HYDROCHLORIDE 200 MG: 200 TABLET ORAL at 07:44

## 2021-03-17 RX ADMIN — Medication 1 CAPSULE: at 07:44

## 2021-03-17 RX ADMIN — MAGNESIUM OXIDE 400 MG: 400 TABLET ORAL at 10:25

## 2021-03-17 RX ADMIN — OMEPRAZOLE 20 MG: 20 CAPSULE, DELAYED RELEASE ORAL at 07:43

## 2021-03-17 ASSESSMENT — ACTIVITIES OF DAILY LIVING (ADL)
ADLS_ACUITY_SCORE: 10

## 2021-03-17 NOTE — PLAN OF CARE
D: Patient presented 2/15 w/mixed cardiogenic and distributive shock with intermittent wide complex tachycardia, initially requiring vasopressors and inotropes, intubated for hypoxemia and treated for legionella pneumonia. Hosptial course further prolonged by persistent oliguric renal failure now requiring dialysis. Hx. NICM s/p HM III 2/18/2020 (post op complicated by retrosternal hematoma with bleeding in the lungs), s/p ICD placed on 3/16/2020, h/o MSSA driveline infection and bacteremia 11/5/2020 on prophylactic cephalexin, h/o VT on amiodarone, moderate nonobstructive CAD, severe MR, atrial fibrillation on warfarin, hypertension, ABHINAV requiring CPAP, CKD IV, DMII, HLP,  h/o HIT.     I: Monitored vitals and assessed pt status.   Tele: SR w/ BBB  O2: RA, home CPAP at night  Mobility: up ad tiffanie     A: A0x4. [place]. L eyelid bruised. VSS. LVAD #'s WNL, no alarms. Afebrile. Urinating adequately. LBM 3/16. Denies pain. AC/HS bg checks. Able to make needs known. Appeared to sleep well overnight.     P: Continue to monitor Pt status and report changes to Cards 2. Plan to discharge today.

## 2021-03-17 NOTE — PLAN OF CARE
Pt with HM3 had his R chest dialysis catheter removed today. Creatinine trending down, good urine output. Driveline dressing changed, site looks very clean. VS and LVAD #s WNL. SR 70-100s. Up in the chair most of the shift, moves independently in the room. CPAP at [facility]. Plan to discharge tomorrow.

## 2021-03-17 NOTE — PLAN OF CARE
Physical Therapy Discharge Summary    Reason for therapy discharge:    Discharged to home.    Progress towards therapy goal(s). See goals on Care Plan in [facility] electronic health record for goal details.  Goals partially met.  Barriers to achieving goals:   discharge from facility.    Therapy recommendation(s):    No further therapy recommended.

## 2021-03-17 NOTE — PROGRESS NOTES
DISCHARGE   Discharged to: [facility]  Via: Automobile  Accompanied by: Wife  Discharge Instructions: principal diagnosis, major findings/procedures, diet, activity, medications, follow up appointments, when to call the MD, and what to watchout for (i.e. s/s of infection, increasing SOB, palpitations, Angina). Pt states understanding of all discharge instructions.   Prescriptions: To be filled by  discharge pharmacy per pt's request; he will  on exit from hospital   Follow Up Appointments: Cardiology clinic 1-2 weeks, nephrology 1-2 weeks. Labs this Friday 3/19/21.   Belongings: All sent with pt. Pt verifies that they are taking all belongings with them.   IV: discontinued.   Telemetry: discontinued.   Pt exhibits understanding of above discharge instructions; all questions answered.  Discharge Paperwork: faxed to discharge planner.

## 2021-03-17 NOTE — PROGRESS NOTES
ANTICOAGULATION  MANAGEMENT: Discharge Review    Eliseo Tanner chart reviewed for anticoagulation continuity of care    Hospital Admission on 2/15 to 3/17  for an infection in your  lungs and underwent aggressive treatment including  antibiotics. Please contact your LVAD coordinator for fever,  chills, worsening cough, increased shortness of breath,  increased swelling in your feet, and weight gain of 3 lbs  overnight or 5 lbs in a week..    Discharge disposition: Home    Results:    Recent labs: (last 7 days)     03/11/21  0440 03/12/21  0537 03/13/21  0440 03/14/21  0459 03/15/21  0551 03/16/21  0551 03/17/21  0613   INR 2.60* 2.59* 2.45* 2.53* 2.53* 2.44* 2.34*     Anticoagulation inpatient management:     less warfarin administered than maintenance regimen    Anticoagulation discharge instructions:     Warfarin dosing: home regimen continued and Next INR 3/19   Bridging: No   INR goal change: No      Medication changes affecting anticoagulation:   START taking:  amLODIPine (NORVASC)  multivitamin RENAL: nephrocaps consist of vitamins B1 (thiamine), B2 (riboflavin), B6, B12, folic acid, niacin, pantothenic acid, biotin, and a small dose of vitamin C.  Start taking on: March 18, 2021  potassium chloride ER (KLOR-CON M)  CHANGE how you take:  bumetanide (BUMEX)  STOP taking:  acetaminophen 325 MG tablet (TYLENOL)  lisinopril 10 MG tablet (ZESTRIL)  multivitamin w/minerals tablet    Additional factors affecting anticoagulation: While inpatient on 2/22, patient Held Warfarin for a tunnel Catheter placement.    Plan     No adjustment to anticoagulation plan needed    Patient not contacted    Anticoagulation Calendar updated    Young Shine RN

## 2021-03-17 NOTE — PLAN OF CARE
Occupational Therapy and Cardiac Rehab Discharge Summary    Reason for therapy discharge:    Discharged to home.    Progress towards therapy goal(s). See goals on Care Plan in [facility] electronic health record for goal details.  Goals partially met.  Barriers to achieving goals:   discharge from facility.    Therapy recommendation(s):    Continue home exercise program.

## 2021-03-17 NOTE — PROGRESS NOTES
LVAD Social Work Service Discharge Note      Patient Name:  Eliseo Tanner     Anticipated Discharge Date:  3/17/2021    Discharge Disposition:   home      Additional Services/Equipment Arranged:  Pt is not in need of home health care services.     Persons notified of above discharge plan:  Cards 2, pt and wife, Veronique    Patient / Family response to discharge plan:  Eager for discharge today. They are are happy and relieved that pt does not require outpatient dialysis.     Education and resources provided by SW at discharge: role of LVAD  in out patient setting and provided contact info for , availability of LVAD virtual support group      Plan:     Met w/ pt and wife prior to discharge and they have no questions or concerns. They were able to talk with Cards 2 LISA on anticipated f/u.

## 2021-03-17 NOTE — DISCHARGE SUMMARY
[facility]   Cardiology II Service / Advanced Heart Failure  Discharge Summary     Eliseo Tanner MRN# [id number]   YOB: 1955 Age: 65 year old     DATE OF ADMISSION:  2/15/2021  DATE OF DISCHARGE:  3/17/2021  ADMITTING PROVIDER:  Nader Cisneros MD  DISCHARGE PROVIDER:  Laine Leon FNP-C  PRIMARY PROVIDER:   Jay Steele    ADMIT DIAGNOSES:   1. Mixed Cardiogenic and septic shock, resolved  2. Acute Hypoxic Respiratory Failure secondary to Legionella Pneumonia    DISCHARGE DIAGNOSES:   1. Chronic SCHF s/p HM III LVAD  2. Elevated LDH secondary to infectious etiology  3. Acute on Chronic CKD Stage IV  4. Wide Complex Tachycardia  5. History of Afib s/p DCCV  6. History of Aflutter  7. Chronic microcytic/hypochromic anemia  8. Coagulopathy related to sepsis, resolved  9. IgG Kappa monoclonal Gammopathy of undetermined significance  10. 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 DM Type II, controlled  12. BPH  13. GERD  14. Gout  15. Mood Disorder     HPI: Please see the detailed H & P by Dr. Cisneros from 2/15/2021. Eliseo Tanner is a 65 year old male with PMHx relevant for NICM with LVEF 15-20%, NYHA III s/p HM III 2/18/2020 (post op complicated by retrosternal hematoma with bleeding in the lungs), s/p ICD placed on 3/16/2020, h/o MSSA driveline infection and bacteremia 11/5/2020 on prophylactic cephalexin, h/o VT on amiodarone, moderate nonobstructive CAD, severe MR, atrial fibrillation on warfarin, hypertension, ABHINAV requiring CPAP, CKD IV, DMII, HLP,  h/o HIT who presented to the Cardiovascular Unit (4E Cardiac ICU) for escalation of cares after an RRT was called for persistent (hemodynamically stable) ventricular tachycardia vs. Atrial flutter.         Prior to this event, patient had presented as a transfer on 02/15/21 from the [facility] due to subacute presentation of dizziness/lightheadedness and, most recently, a near syncopal event that occurred earlier this morning  "while patient was sitting in his toilet straining for a bowel movement. He reported feeling weak and foggy. He reported sustaining a mechanical fall without losing consciousness or receiving any trauma to his head. Of note, patient recently received His first dose of the COVID-19 vaccine (Moderna) on Thursday 02/11/21. He reported feeling overall \"poorly\" the subsequent days. Symptoms were described as general malaise, poor appetite (with decreased oral intake of arellano liquid/solid nutrients), more short of breath/labored breathing, lightheadedness/\"Fuzzy\" sensation and some disorientation. He did not take his medications this morning because he was feeling unwell.          While at the [facility] ([place], [place]), patient received 500 cc of IV isotonic fluid (bolus) ago with 500 mg of Azithromycin/1g of Ceftriaxone (IV). Vital signs remarkable for normotension, HR in the 110's and fever (38.1C, 102.3F). Imaging was consistent of chest X-ray which  and revealed patchy right sided infiltrates concerning for infectious phenomenon and a Head CT scan w/o contrast which showed no acute intracranial pathologies, chronic small vessel ischemia (likely a manifestation of hypertensive arteriolosclerosis) and mild cerebral atherosclerosis. Laboratory work-up included CBC with Leukocytosis w/ neutrophilia  (WBC: 21.4H; Segs: 86 H), chronic microcytic/hypochromic anemia (Baseline Hgb likely ~ 9-10 g/dL). Chemistry relevant for lactic acidosis (Lactate: 4.0), elevated creatinine (Cr: 2.8, baseline ~1.8-2.0), transaminitis ( AST: 195, ALT: 111), CPK: 176, Troponin elevation: 0.18 and NT-Pro BNP: 18,500). Coagulation results INR: 1.4, PTT: 34.1          After transferral to the [facility], patient  Was found to be in Atrial Flutter (baseline rhythm) vs. Ventricular Tachycardia, HR in the 140's bpm. After transferral to the 4E unit, patient had received one dose of Adenosine and 150mg bolus of Amiodarone. He " eventually received an additional dose  Of 150mg IV of Amiodarone. Patient has been hemodynamically stable although feeling lightheaded, short of breath, nauseous, and overall unwell.     PHYSICAL EXAM:  Blood pressure 122/87, pulse 77, temperature 97.8  F (36.6  C), temperature source Oral, resp. rate 16, weight 87.1 kg (192 lb 1.6 oz), SpO2 97 %.  GENERAL: Appears alert and oriented times three.   HEENT: Eye symmetrical and free of discharge bilaterally. Mucous membranes moist and without lesions.  NECK: Supple and without lymphadenopathy. JVD at clavicular line.   CV: RRR, S1S2 present with LVAD hum  RESPIRATORY: Respirations regular, even, and unlabored. Lungs CTA throughout.   GI: Soft and non distended with normoactive bowel sounds present in all quadrants. No tenderness, rebound, guarding. No organomegaly.   EXTREMITIES: No peripheral edema. 2+ bilateral pedal pulses.   NEUROLOGIC: Alert and orientated x 3. CN II-XII grossly intact. No focal deficits.   MUSCULOSKELETAL: No joint swelling or tenderness.   SKIN: No jaundice. No rashes or lesions. LVAD drive line covered.     LABS:   Last CBC:   Recent Labs   Lab Test 03/17/21  0613   WBC 6.6   RBC 3.43*   HGB 9.0*   HCT 29.0*   MCV 85   MCH 26.2*   MCHC 31.0*   RDW 22.6*          Last CMP:  Recent Labs   Lab Test 03/17/21  0613 03/15/21  0551 03/15/21  0551      < > 137   POTASSIUM 3.9   < > 3.6   CHLORIDE 102   < > 101   CALOS 8.3*   < > 8.4*   CO2 29   < > 29   BUN 69*   < > 71*   CR 2.00*   < > 2.31*   *   < > 119*   AST  --   --  39   ALT  --   --  29   BILITOTAL  --   --  0.4   ALBUMIN  --   --  2.9*   PROTTOTAL  --   --  7.7   ALKPHOS  --   --  121    < > = values in this interval not displayed.       IMAGING:  Results for orders placed or performed during the hospital encounter of 02/15/21   XR Chest Port 1 View    Narrative    Chest radiograph 2/15/2021 8:19 PM    HISTORY: Status post right IJ central line    COMPARISON: CT chest  abdomen pelvis 2/8/2021    FINDINGS:  Single AP radiograph of the chest. Postoperative changes of LVAD with  median sternotomy wires. Left chest wall cardiac device with lead  overlying the right ventricle. Right IJ central line sheath tip  overlying the low right internal jugular vein/right innominate vein.  Trachea is midline. Cardiac silhouette is similar in size. No  pneumothorax. No significant right pleural effusion. Left costophrenic  angle is collimated out of view. New right mid lung and lateral right  basilar airspace opacities. Unchanged right midlung streaky opacity.      Impression    IMPRESSION:  1. New right IJ central line sheath tip overlying the low right  internal jugular vein/right innominate vein.  2. New right mid lung and lateral right basilar airspace opacities,  concerning for infection.    I have personally reviewed the examination and initial interpretation  and I agree with the findings.    DONG SOLORIO MD   CT Chest Abdomen Pelvis w/o Contrast     Value    Radiologist flags Concern for pneumonia (Urgent)    Narrative    EXAMINATION: CT CHEST ABDOMEN PELVIS W/O CONTRAST, 2/15/2021 11:33 PM    TECHNIQUE:  Helical CT images from the thoracic inlet through the  symphysis pubis were obtained  with contrast. Contrast dose:    COMPARISON: CT 2/8/2021    HISTORY: Sepsis; LVAD, looking for sepsis    FINDINGS:    Chest:     Left chest wall ICD with lead in the right ventricle. Right IJ sheath  tip terminates in the distal internal jugular vein. LVAD in place at  the cardiac apex with outflow tract entering the proximal ascending  aorta. Along the course of the LVAD driveline and out the outflow  track within the anterior-inferior mediastinum at the level of the  diaphragm, there is trace surrounding fluid collection (series 2 image  194), is decreased from the prior examination. No significant  inflammatory changes about this collection. There is mild surrounding  inflammation within the  subcutaneous fat and superior right rectus  abdominis musculature surrounding the drive line, similar to prior.    The visualized thyroid is unremarkable. Normal branching pattern of  the thoracic great vessels. Heart size is upper limits normal. The  main pulmonary artery and thoracic aorta are normal in caliber.  Interval increase in size of a high right paratracheal node measuring  up to 10 mm compared to 7 mm on prior exam. Multiple additional  scattered mediastinal nodes appear slightly increased in size compared  to prior exam 02/08/2021.    The central tracheobronchial tree is patent. Small right-sided pleural  effusion and trace left-sided pleural effusions. Scattered patchy  pulmonary consolidations, involving both lungs and greatest within the  posterior aspects of the right upper and lower lobes. No pneumothorax.    Abdomen and pelvis:     No focal hepatic lesion on today's noncontrast exam. Layering  hyperattenuating fluid within the gallbladder, may represent  gallbladder sludge. Spleen is unremarkable. Mild stable thickening of  the adrenal glands bilaterally. Fatty atrophy of the pancreas. A few  small scattered calcifications within the pancreas can be seen with  chronic pancreatitis. No renal mass or urinary tract stones. No  hydronephrosis.    Gastric distention with fluid content. The small and large bowel are  normal in caliber without evidence of a bowel obstruction. Colonic  diverticulosis without acute diverticulitis. Appendix is normal.     Diffuse mesenteric edema and small amount of ascites, which has  increased since prior exam 02/08/2021. Perihepatic fluid and a small  amount of presacral fluid identified. No discrete walled off fluid  collection. Mild diffuse thickening of the urinary bladder, similar to  prior. Prostate is unremarkable. Symmetric seminal vesicles. No  intra-abdominal or pelvic lymphadenopathy. Normal caliber abdominal  aorta with mild to moderate atrophy, calcifications  noted in the  origin of the celiac, and SMA arteries.    Bones and soft tissues: No acute or suspicious osseous lesion.  Postoperative changes of median sternotomy with intact sternotomy  wires. Degenerative changes of the spine. Grade 1 anterolisthesis of  L3 on L4 and L4 on L5, stable. There appears to be moderate to severe  canal stenosis at L4-L5 secondary to a disc osteophyte complex. Small  fat-containing inguinal hernia. Degenerative changes about the  bilateral hips with mineralized density seen within the surrounding  bursae.      Impression    IMPRESSION:   1. Diffuse patchy consolidations throughout both lungs concerning for  an acute infectious process. Interval increase in size of multiple  mediastinal lymph nodes measuring up to 10 mm, likely reactive.  2. Small right-sided pleural effusion and trace left-sided pleural  effusion.  3. Postsurgical changes of LVAD placement with mild inflammatory soft  tissue changes about the drive line traversing the subcutaneous fat  and superior right rectus abdominis musculature, concerning for drive  line infection, similar to prior.   4. Diffuse mesenteric edema and small amount of intra-abdominal  ascites, new from prior. Fluid within the abdomen and pelvis measures  low density.  5. Mild diffuse bladder wall thickening, this can be seen with  cystitis. Recommend clinical correlation.    [Urgent Result: Concern for pneumonia]    Dr. Fuentes was contacted by Dr. Oliver at 2/16/2021 1:31 AM and  verbalized understanding of the urgent finding.     I have personally reviewed the examination and initial interpretation  and I agree with the findings.    DEION CRUZ MD   XR Chest Port 1 View    Narrative    EXAM: XR CHEST PORT 1 VW  2/16/2021 2:25 AM     HISTORY:  intubated and swan chucky       COMPARISON:  CT and x-ray 2/15/2021    FINDINGS:   AP semiupright view of the chest. Endotracheal tube tip in the mid  thoracic trachea. Right IJ [place]-Chucky catheter tip  projects over the  expected location of the interlobar right pulmonary artery. Left chest  wall ICD. Enteric tube sidehole projects over the stomach, tip outside  the field of view. LVAD. Intact median sternotomy wires. No  appreciable pneumothorax. Small right pleural effusion. Right greater  than left patchy pulmonary opacities. Small amount of fluid within the  right minor fissure. Osseous structures are unchanged.      Impression    IMPRESSION:   1. Endotracheal tube tip projects in the mid thoracic trachea.  2. Right IJ [place]-Chucky catheter tip projects in the expected location  of the right interlobar pulmonary artery. Additional support devices  as above.  3. Right greater than left patchy pulmonary opacities, consolidation  or atelectasis.  4. Small right pleural effusion.    I have personally reviewed the examination and initial interpretation  and I agree with the findings.    DEION CRUZ MD   XR Abdomen Port 1 View    Narrative    Exam: XR ABDOMEN PORT 1 VW, 2/16/2021 2:25 AM    Indication: og placement    Comparison: CT 2/15/2021    Findings:   Portable supine view the abdomen. Enteric tube tip and sidehole  project over the stomach. LVAD. [place]-Chucky catheter tip projects over  the expected location of the right interlobar pulmonary artery. Mild  gaseous distention of bowel visualized in the upper abdomen. No portal  venous gas. Degenerative changes of the spine.      Impression    Impression:  1. Enteric tube tip and sidehole project over the stomach.  2. Mild nonspecific gaseous distention of the bowel visualized in the  upper abdomen.    I have personally reviewed the examination and initial interpretation  and I agree with the findings.    DEION CRUZ MD   XR Chest Port 1 View    Narrative    EXAM: XR CHEST PORT 1 VW  2/16/2021 10:46 AM     HISTORY:  dialysis line placed       COMPARISON:  Chest x-ray 2/16/2021 2:15 AM    FINDINGS:   Semiupright portable AP view of the chest. Left IJ central  venous  dialysis catheter with tip projecting in the mid SVC. Endotracheal  tube tip in the mid thoracic trachea. Right IJ [place]-Chucky catheter tip  at the junction of the right pulmonary and interlobar artery. Left  chest wall ICD with single intact lead projecting over the heart in  similar position. Enteric tube passes below the left hemidiaphragm and  out of field of view. LVAD. Postsurgical changes of the chest with  median sternotomy wires intact.    Trachea is midline. Cardiomediastinal silhouette is within normal  limits. No appreciable pneumothorax. No significant change to small  right pleural effusion. Similar right greater than left patchy  pulmonary opacities. Persistent small fluid in the minor fissure.      Impression    IMPRESSION:     1. New left IJ central venous dialysis catheter with tip projecting in  the mid SVC. Stable position of other support devices.  2. No significant change in the patchy pulmonary opacities,  consolidation and/or atelectasis  3. No significant change in small right pleural effusion.    I have personally reviewed the examination and initial interpretation  and I agree with the findings.    QUIQUE NICHOLS MD   CT Chest Abdomen Pelvis w/o Contrast    Narrative    EXAMINATION: CT CHEST ABDOMEN PELVIS W/O CONTRAST  2/16/2021 4:22 PM      CLINICAL HISTORY: Sepsis    COMPARISON: CT chest abdomen and pelvis 2/15/2021        PROCEDURE COMMENTS: CT of the chest, abdomen, and pelvis was performed  without contrast. Axial MIP  images of the chest, and coronal and  sagittal reformatted images of the chest, abdomen, and pelvis  obtained.    FINDINGS:    Chest:  Left IJ CVC with tip in the mid SVC. Right IJ [place]-Chucky catheter with  tip in the right interlobar pulmonary artery. The endotracheal tube is  in the midthoracic trachea. Stable position of LVAD device with trace  fluid surrounding the outflow track in the anterior mediastinum. Left  chest wall ICD with lead in the right  ventricle. Mild fat stranding in  the superior abdominal wall adjacent to the drive line , similar to  prior. Enteric tube terminates within the stomach.    The central tracheal bronchial tree is patent. Bilateral pleural  effusions, greater on the right, slightly increased from prior. No  pneumothorax. Scattered areas of patchy consolidative opacities  throughout the lungs, similar to appearance from prior. The heart is  mildly enlarged. No pericardial effusion. Moderate to severe coronary  artery calcifications. Normal caliber thoracic aorta and main  pulmonary artery. Conventional 3 vessel branching aortic arch.  Multiple prominent borderline enlarged mediastinal lymph nodes,  largest right paratracheal lymph node measuring up to 10 mm in short  axis (series 4 image 91).    Abdomen/pelvis:  Limited evaluation due to noncontrast examination. The liver is normal  in appearance. No focal hepatic mass. Biliary sludge. Diffuse atrophy  of the pancreas. Increased simple attenuating peripancreatic fluid.  Stable mild nodular thickening of the adrenal glands. The kidneys are  normal in appearance. No hydronephrosis or nephrolithiasis. Urinary  bladder is decompressed and contains a Guevara catheter. No abnormally  dilated loops of large or small bowel. Colonic diverticulosis without  evidence of acute diverticulitis. No free intraperitoneal air.  Increased diffuse intra-abdominal ascites. Stable hazy attenuation  within the mesentery. The infrarenal aorta is of normal caliber.  Aortoiliac atherosclerotic calcifications. No abnormally sized lymph  nodes within the abdomen or pelvis. Fat-containing left inguinal  hernia. Small left hydrocele.    Bones:   Post surgical changes of median sternotomy. Multilevel degenerative  changes of the spine. Grade 1 anterolisthesis of L3 on L4 and L4-L5.  Marked spinal canal narrowing at L4-L5. Degenerative changes of the  hips and sacroiliac joints. No suspicious or aggressive appearing  bone  lesions.      Impression    IMPRESSION:  1. Overall stable patchy consolidative pulmonary opacities, concerning  for pneumonia.  2. Stable prominent and borderline enlarged mediastinal lymph nodes,  favored to be reactive.  3. Mild increase in small bilateral pleural effusions, greater on the  right, with associated compressive atelectasis.  4. Overall stable LVAD drive line appearance in the superior abdominal  wall without discrete fluid collection or significant inflammation.  5. Mild increase in diffuse intra-abdominal ascites.    I have personally reviewed the examination and initial interpretation  and I agree with the findings.    QUIQUE NICHOLS MD   CT Head w/o Contrast    Narrative    CT HEAD W/O CONTRAST 2/16/2021 4:08 PM    History: Mental status change, unknown cause; Septic shock. Unclear  source     Comparison: CT head 1/5/2021    Technique: Using multidetector thin collimation dual-energy helical  acquisition technique, axial, coronal and sagittal CT images from the  skull base to the vertex were obtained without intravenous contrast.    Findings: There is no intracranial hemorrhage, mass effect, or midline  shift. Gray/white matter differentiation in both cerebral hemispheres  is preserved. Ventricles are proportionate to the cerebral sulci. The  basal cisterns are clear. Unchanged bilateral basal ganglia  calcifications, greatest on the left. The orbits are grossly  unremarkable.    The bony calvaria and the bones of the skull base are normal. The  visualized portions of the paranasal sinuses and mastoid air cells are  clear.     Partially visualized endotracheal tube and enteric tube.      Impression    Impression:  No acute intracranial pathology.     I have personally reviewed the examination and initial interpretation  and I agree with the findings.    DEION MAURO MD   XR Chest Port 1 View    Narrative    EXAM: XR CHEST PORT 1 VW  2/17/2021 1:58 AM     HISTORY:  intubated and swan yogesh        COMPARISON:  2/16/2021    FINDINGS:   AP semiupright view of the chest. Endotracheal tube tip in the mid  thoracic trachea. Enteric tube courses below the diaphragm with tip  outside the field of view. Left chest wall ICD with lead projecting  over the right ventricle. Postoperative changes of LVAD. Intact median  sternotomy wires and mediastinal surgical clips. Right IJ [place]-Chucky  catheter tip projects over the main pulmonary artery. Left IJ central  venous catheter tip projects at the proximal SVC.    Midline trachea. Cardiomediastinal silhouette is stable. No  appreciable pneumothorax. Small bilateral pleural effusions. No  significant change in the bilateral patchy pulmonary opacities  visualized upper abdomen is unremarkable..      Impression    IMPRESSION:   1. Right IJ [place]-Chucky catheter tip projects over the main pulmonary  artery. Additional support devices are stable.  2. Small bilateral pleural effusions.  3. No significant change in the bilateral patchy pulmonary opacities,  consolidation and/or atelectasis.    I have personally reviewed the examination and initial interpretation  and I agree with the findings.    OSITO BRAVO MD   XR Chest Port 1 View    Narrative    Exam: XR CHEST PORT 1 VW, 2/17/2021 10:45 AM    Indication: Post [place] position adjustment    Comparison: 2/17/2021    Findings:   Right internal jugular [place]-Chucky catheter tip projects over the right  main pulmonary artery. Left internal jugular central venous catheter  tip at the brachiocephalic confluence. Stable left chest wall  implantable cardiac defibrillator and LVAD. Median sternotomy wires.  Endotracheal tube tip at the mid to low thoracic trachea. Enteric tube  sidehole projects over the stomach. Stable enlarged cardiac  silhouette. The pulmonary vasculature is indistinct. Low lung volumes  with streaky perihilar and medial bibasilar opacities. Possible trace  bilateral pleural effusions. No pneumothorax.      Impression     Impression: [place]-Chucky catheter tip projects at the mid right pulmonary  artery.    CHARLES HERNANDEZ, DO   XR Chest Port 1 View    Narrative    EXAM: XR CHEST PORT 1 VW  2/18/2021 1:10 AM     HISTORY:  intubated and swan chucky       COMPARISON:  2/17/2021    FINDINGS:   AP semiupright view of the chest. Endotracheal tube tip projects in  the mid thoracic trachea. Left IJ central venous catheter tip projects  over the proximal SVC. Right IJ [place]-Chucky catheter tip projects over  the right pulmonary artery. Enteric tube courses below the diaphragm  with tip outside the field of view. LVAD. Left chest wall  defibrillator with lead projecting over the right ventricle.  Postoperative changes with intact median sternotomy wires and  mediastinal surgical clips.    Midline trachea. Cardiomediastinal silhouette is stable. Low lung  volumes with unchanged streaky perihilar and bibasilar opacities.  Small bilateral pleural effusions. No appreciable pneumothorax.      Impression    IMPRESSION:   1. Stable support devices.  2. Low lung volumes with unchanged streaky perihilar and bibasilar  opacities, atelectasis, edema or consolidation.  3. Bilateral pleural effusions.    I have personally reviewed the examination and initial interpretation  and I agree with the findings.    TIERRA MILLER MD   US Abdomen Limited w Abd/Pelv Duplex Complete Port    Narrative    EXAMINATION: US ABDOMEN LIMITED WITH DOPPLER COMPLETE PORTABLE  2/18/2021 10:01 AM     COMPARISON: CT of the chest abdomen and pelvis dated 2/16/2021,  2/15/2021    HISTORY: Acute liver failure, please evaluate with Dopplers.    TECHNIQUE: The abdomen was scanned in standard fashion with  specialized ultrasound transducer(s) using both gray-scale, color  Doppler, and spectral flow techniques.    Findings:  Patient with LVAD, and associated waveform abnormalities.    Liver: The liver measures 19.6 cm and demonstrates normal homogeneous  echotexture. No evidence of a  focal hepatic mass.     Extrahepatic portal vein flow is antegrade at 39 cm/s.  Right portal vein flow is antegrade, measuring 29 cm/s.  Left portal vein flow is antegrade, measuring 24 cm/s.    Flow in the hepatic artery is towards the liver and:  33 cm/s peak systolic  0.71 resistive index.     Left hepatic artery: 25 cm/s, RI 0.68  Right hepatic artery: 29 cm/s, RI 0.67    The splenic vein is not visualized on this exam The left hepatic vein  is patent with pulsatile flow towards the IVC at 37 cm/s. Middle  hepatic vein is patent with pulsatile flow towards the IVC at 44 cm/s.  Right hepatic vein is patent with pulsatile flow towards the IVC at 34  cm/s. The IVC is patent with flow towards the heart.   The visualized  aorta is not dilated.    Gallbladder: There is no wall thickening, pericholecystic fluid, or  evidence for cholelithiasis    Bile Ducts: Both the intra- and extrahepatic biliary system are of  normal caliber.  The common bile duct measures 3 mm.    Pancreas: Not well visualized on this exam.    Kidneys: Both kidneys are of normal echotexture, without mass or  hydronephrosis.   Renal lengths: right- 10.4 cm,    Fluid: Trace perihepatic ascites.      Impression    Impression:   Patient with LVAD and associated venous and arterial blood flow  abnormalities.    No evidence of portal venous thrombosis.    Trace perihepatic ascites.    I have personally reviewed the examination and initial interpretation  and I agree with the findings.    ANTHONY HUERTA MD   XR Chest Port 1 View    Narrative    EXAM: XR CHEST PORT 1 VW  2/19/2021 1:00 AM     HISTORY:  intubated and swan chucky       COMPARISON:  2/18/2021    FINDINGS:   AP semiupright view of the chest. Endotracheal tube tip projects in  the mid thoracic trachea. Left IJ central venous catheter tip projects  over the proximal SVC. Right IJ [place]-Chucky catheter tip projects over  the right pulmonary artery enteric tube courses below the diaphragm  with tip  outside the field-of-view. LVAD. Left chest wall  defibrillator with stable lead positioning. Postoperative changes with  intact median sternotomy wires and mediastinal surgical clips.    Midline trachea. Cardiomediastinal silhouette is stable. Low lung  volumes with unchanged perihilar and bibasilar opacities. Small right  pleural effusion and small amount of fluid in the fissure. Left  effusion has improved. No appreciable pneumothorax.      Impression    IMPRESSION:   1. Stable support devices.  2. Low lung volumes with unchanged streaky perihilar and bibasilar  opacities, atelectasis, edema or consolidation.  3. Stable small right pleural effusion.     I have personally reviewed the examination and initial interpretation  and I agree with the findings.    KRYSTEN SOLIZ MD   XR Chest Port 1 View    Narrative    Exam: XR CHEST PORT 1 VW, 2/19/2021 3:13 PM    Indication: Verify PICC    Comparison: 2/19/2021    Findings:   Pulmonary arterial catheter, left IJ central venous catheter,  pacemaker and its leads, LVAD, endotracheal tube and enteric tube are  all stable. New right arm PICC tip in the low superior vena cava.    Heart within normal limits. Low lung volumes. Perihilar and lower lobe  opacities suggests atelectasis or edema.      Impression    Impression:   1. New right arm PICC tip in the low superior vena cava. Remainder the  support devices are stable.  2. Low lung volume with perihilar and lower lobe opacity suggests  atelectasis. Edema would have a similar appearance.    DEVON CHARLES MD   XR Chest Port 1 View    Narrative    EXAM: XR CHEST PORT 1 VW  2/20/2021 2:56 AM     HISTORY:  intubated and swan chucky       COMPARISON:  2/19/2021    FINDINGS:   AP semiupright view of the chest. Endotracheal tube tip projects in  the mid/low thoracic trachea approximately 2.3 cm above the lucio.  Right IJ [place]-Chucky catheter tip projects over the right pulmonary  artery. The left IJ central venous catheter,  pacemaker and its leads,  LVAD and enteric tube all remain in a similar position. Intact median  sternotomy wires. Mediastinal surgical clips.    Cardiomediastinal silhouette is stable. Midline trachea. No  appreciable pneumothorax. Trace fluid in the right minor fissure.  Significant pleural effusion. Improved perihilar predominate and lower  lobe pulmonary opacities. Low lung volumes.       Impression    IMPRESSION:   1. Endotracheal tube tip projects in the mid/low thoracic trachea  approximately 2.3 cm above the luico. Additional support devices are  stable.  2. Low lung volumes with mildly improved perihilar and lower lobe  opacities.    I have personally reviewed the examination and initial interpretation  and I agree with the findings.    JEREMIE JOE MD   XR Chest Port 1 View    Narrative    EXAM: XR CHEST PORT 1 VW  2/21/2021 1:48 AM     HISTORY:  intubated and swan chucky       COMPARISON:  2/20/2021.    FINDINGS:   AP portable view of the chest. Patient has been extubated. Left arm  PICC tip projects near the brachiocephalic confluence. Right IJ  [place]-Chucky catheter tip projects over the proximal right pulmonary  artery. Right arm PICC tip projects over the right atrium. Left chest  wall defibrillator with stable lead positioning. LVAD. Intact median  sternotomy wires.    Midline trachea. Cardiomediastinal silhouette is stable. No  appreciable pneumothorax or significant pleural effusion. Low lung  volumes with no significant change in the mild perihilar and lower  lobe hazy opacities. No new focal airspace opacity. Visualized upper  abdomen is unremarkable.      Impression    IMPRESSION:   1. Patient has been extubated.  2. [place]-Chucky catheter tip projects over the proximal main right  pulmonary artery. Additional support devices are stable.  3. Low lung volumes with no significant change in the mild perihilar  and lower lobe opacities.    I have personally reviewed the examination and initial  interpretation  and I agree with the findings.    JEREMIE JOE MD   XR Chest Port 1 View    Narrative    Chest radiograph 2/21/2021 11:22 AM    HISTORY: Verify PICC    COMPARISON: Same-day chest radiograph at 12:39 AM    FINDINGS:  Single AP view of the chest. Postoperative changes of LVAD with  unchanged median sternotomy wires. Left chest wall cardiac device with  lead in similar position. Left IJ central line tip overlying the  innominate confluence. Right IJ [place]-Chucky catheter tip overlying the  right pulmonary artery. Right upper extremity PICC tip projects over  the high right atrium. Trachea is midline when accounting for rotated  patient positioning. Cardiac silhouette is similar in size. No  pneumothorax. Low lung volumes. No significant pleural effusion.  Mildly increased hazy right perihilar opacities with unchanged  bibasilar hazy opacities.      Impression    IMPRESSION:  1. Right upper extremity PICC tip overlying the high right atrium.  2. Remaining support devices in similar position.  3. Low lung volumes with mildly increased hazy right perihilar and  unchanged bibasilar opacities.    I have personally reviewed the examination and initial interpretation  and I agree with the findings.    JEREMIE JOE MD   XR Chest Port 1 View    Narrative    EXAM: XR CHEST PORT 1 VW  2/22/2021 1:58 AM     HISTORY:  Intubated and swan chucky       COMPARISON:  2/21/2021    FINDINGS:   Frontal radiograph of the chest. Interval removal of right IJ  [place]-Chucky catheter. The left IJ CVC sheath, right upper extremity  PICC, LVAD, and left chest wall implanted cardiac device with cardiac  lead are all in stable position. Postsurgical changes of the chest is  unchanged median sternotomy wires. The cardiac silhouette size is  unchanged. No pneumothorax. No pleural effusion. Low lung volumes with  slight increase in bibasilar and perihilar opacities.      Impression    IMPRESSION:   1. Interval removal of right IJ [place]  catheter. Additional support  devices are stable.  2. Low lung volumes with increased perihilar and bibasilar opacities.    I have personally reviewed the examination and initial interpretation  and I agree with the findings.    JEREMIE JOE MD   XR Chest Port 1 View    Narrative    Portable chest to/22/21 at 1034 hours    INDICATION: Verify dialysis line placement    COMPARISON: Earlier today at 0113 hours    FINDINGS: Heart size is normal. LVAD and implantable cardiac  defibrillator again noted. Median sternotomy. Right PICC line tip is  at the right atrium. Right IJ catheter sheath tip is in the confluence  of the right IJ and brachiocephalic veins. No visible pneumothorax.  Left IJ CVC sheath appears with its tip in the bridging innominate  vein.      Impression    IMPRESSION: Right IJ catheter sheath tip a confluence of internal  jugular and innominate veins. Other support tubes and devices,  including LVAD, again noted.    AIME GAY MD   IR CVC Tunnel Placement > 5 Yrs of Age    Narrative    Procedure: 2/23/2021.  1. Placement of 14.5 Albanian, 23 cm, double lumen, tunneled central  venous catheter.    History: Patient needs dialysis.    Staff: Daquan Archibald M.D.    Fellow: Salbador Munoz D.O.    Monitoring: Patient was placed on continuous monitoring supervised by  the IR nursing staff and IR attending. Patient remained stable  throughout the procedure.    Medications:   1. Fentanyl 50 mcg IV  2. Versed 1 mg IV  3. 1% lidocaine 12 ml local anesthesia  4. Anticoagulant citrate, 2.5 mmol per lumen    Sedation time: 25 minutes.    Fluoroscopy time: 1.3 minutes.    IV contrast: None.    Consent: Informed written and verbal consent was obtained.    Procedure/Findings: The patient was placed supine on the fluoroscopy  table.  Ultrasound was used to identify a patent right internal  jugular vein, although small nonocclusive filling defect was present,  and an image was saved.  The patient's right neck  and chest were  prepped and draped in the usual sterile fashion. The dermatotomy site  was anesthetized with 1% lidocaine. A 5 mm incision was made with a #  11 blade, and bluntly dissected. Under ultrasound guidance, a .018  micropuncture needle was advanced into the right internal jugular  vein. Under fluoroscopic visualization, a 0.018 soft tipped wire was  advanced into the right atrium. Micropuncture needle was exchanged  over a wire for a 3-5 Burkinan dilator. Under fluoroscopy, the wire was  used to measure from the patient's right atrium to the skin. The wire  and inner 3 Burkinan dilator were removed, and under fluoroscopic  guidance, a 0.035 Bentson wire advanced through the right atrium and  into the inferior vena cava and secured.     The chest was anesthetized with lidocaine. A 5 mm incision was made  with a # 11 blade, a tunnel was created with a tunneling device, and  the catheter was pulled through. The dilator was exchanged over wire  for a 14 Burkinan dilator/peel-away sheath. The wire and dilator were  removed, and the 14.5 Burkinan, 23 cm, double lumen, catheter was  advanced through the peel-away sheath into the vessel and the  peel-away sheath removed. Fluoroscopy revealed the catheter tip to lie  at the mid right atrium.  The catheter flushed and aspirated freely  and was flushed with 2.5 mL anticoagulate citrate. The catheter was  secured with one 2-0 Ethilon retention suture and the site was  cleansed and dressed. The neck incision was cleansed and the skin  edges were approximated and glued. Images were saved throughout the  procedure. The procedure was well tolerated, with no immediate  complications.    Estimate blood loss: 5 cc.    Specimens: None.      Impression    Impression: Placement of right IJ, 14.5 Burkinan, 23 cm, double lumen,  tunneled central venous catheter. Catheter is locked with  anticoagulant citrate and ready for immediate use.    Plan: Patient discharged to patient care unit 2A  in stable condition.  The glued neck incision does not require dressings.  If the glue is  still adhered to the neck incision in 10 days, it may be gently  removed.  The chest incision should be kept clean, dry, and covered,  with daily dressing changes, for 3 days.  The catheter exit site  should not be submerged, and should be covered when showering/bathing.  The catheter retention suture may be removed in 2 weeks.    I, MORIAH DURAND MD, attest that I was present for all critical  portions of the procedure and was immediately available to provide  guidance and assistance during the remainder of the procedure.    I have personally reviewed the examination and initial interpretation  and I agree with the findings.    MORIAH DURAND MD   XR Chest Port 1 View    Narrative    Exam: XR CHEST PORT 1 VW, 3/15/2021 9:34 AM    Indication: intubated and swan yogesh. Cough and fevers.    Comparison: Chest x-ray 2/28/2021    Findings:   A single portable AP semiupright view of the chest was obtained. The  patient is slightly rotated to the left. Postsurgical changes of  sternotomy, wires are intact. Right internal jugular dialysis catheter  tip terminates the cavoatrial junction. Left chest cardiac  defibrillator is in place, leads are intact. LVAD in place. Right  upper extremity PICC tip terminates at the cavoatrial junction.    Trachea is midline. The cardia pulmonary silhouette is within normal  limits. Right lower lobe linear opacity is favored to represent  atelectasis. There is no pneumothorax or pleural effusion. Upper  abdomen is unremarkable.      Impression    Impression:   1. Support devices, as above.  2. Right lower lobe linear opacity is favored to represent  atelectasis. No focal infectious airspace opacity.    I have personally reviewed the examination and initial interpretation  and I agree with the findings.    CHARLES HERNANDEZ, DO   IR CVC Tunnel Removal Right    Narrative    Eliseo Tanner  "  [id number]      [id number]    WOLFGANG LEACH  [id number]  1955;  65 years    IR CVC TUNNEL REMOVAL RIGHT  Removal TCVC right    -----    PRE-OPERATIVE DIAGNOSIS:  Catheter status    POST-OPERATIVE DIAGNOSIS:  Same    PROCEDURE:  Removal of tunneled central venous catheter (CVC)      Impression    IMPRESSION:  Completed removal of dual lumen tunneled central venous  access catheter via RIGHT chest. Dx: no longer needed. Negra.     -----    CLINICAL HISTORY:  The patient has a tunneled central venous catheter;   therapy was complete and catheter removal was requested, no longer  needed.    PERFORMED BY:  JABARI Rob PA-C (Interventional  Radiology)    MEDICATIONS:  1% lidocaine was used for local anesthesia    DESCRIPTION:  The catheter and its entry site into the skin were  prepped and draped in usual sterile fashion.  Physical examination  demonstrated no erythema, tenderness, fluctuance, or discharge at the  catheter exit site or along the tract.  Lidocaine injection and  dissection were used around the catheter and subcutaneous cuff.     Using steady traction, the entire catheter was removed without  difficulty, cuff removed completely.  Compression was applied over the  venipuncture site, as well as along the tract, until good hemostasis  was achieved.  Sterile dressing was applied to the exit site wound.    COMPLICATIONS:  No immediate complication    ESTIMATED BLOOD LOSS:  Minimal    SPECIMENS:  None    TIME:  A total of 20 minutes was spent with the patient.  Greater than  50% of the time was spent in counseling, education, and coordination  of care.  -----    \"The physician assistant (PA) who performed this procedure and signed  the above report is licensed to practice in the state [facility]  pursuant to [place] Statute 147A.09.  This includes meeting the Statute and  [place] Board of Medical Practice requirement of a collaborating  physician.\"  -----    DENNY WOOTEN PA-C "   Cardioversion    Narrative    Jamar Butler MD     3/3/2021  1:26 PM  [facility]    Procedure: Cardioversion    Date/Time: 3/3/2021 10:40 AM  Performed by: Maciel House MD  Authorized by: Candis Zapata APRN CNP     UNIVERSAL PROTOCOL   Site Marked: Yes  Prior Images Obtained and Reviewed:  Yes  Required items: Required blood products, implants, devices and special   equipment available    Patient identity confirmed:  Arm band and provided demographic data  Patient was reevaluated immediately before administering moderate or deep   sedation or anesthesia  Confirmation Checklist:  Patient's identity using two indicators  Time out: Immediately prior to the procedure a time out was called    Universal Protocol: the [facility] Universal Protocol was followed           ANESTHESIA  Anesthesia was administered and monitored by anesthesiology.  See   anesthesia documentation for details.    SEDATION    Patient Sedated: Yes    Sedation:  See MAR for details  Vital signs: Vital signs monitored during sedation      PROCEDURE DETAILS  Cardioversion basis: elective  Pre-procedure rhythm: atrial flutter  Patient position: patient was placed in a supine position  Chest area: chest area exposed  Electrodes: pads  Electrodes placed: anterior-posterior  Number of attempts: 1    Details of Attempts:  200 J DCCV successful conversion to NSR  Post-procedure rhythm: normal sinus rhythm  Complications: no complications    PROCEDURE   Patient Tolerance:  Patient tolerated the procedure well with no immediate   complications       Echocardiogram Limited    Narrative    [id number]  [id number]  [id number]  [id number]^ROMAINE^BRANNON           [facility],[place]  Echocardiography Laboratory  [address] [place] [address]     Name: WOLFGANG LEACH  MRN: [id number]  : 1955  Study Date: 2021 07:52 AM  Age: 65 yrs  Gender:  Male  Patient Location: [facility]  Reason For Study: CHF  Ordering Physician: BRANNON GAVIN  Referring Physician: SYSTEM, PROVIDER NOT IN  Performed By: Sj Guillen RDCS     BSA: 2.1 m2  Height: 67 in  Weight: 221 lb  HR: 94  BP: 103/85 mmHg  _____________________________________________________________________________  __        Procedure  Limited Portable Echo Adult.  _____________________________________________________________________________  __        Interpretation Summary  HM 3 at 5500 RPM  LV size is small, LVIDd = 3.0 cm. Severely reduced left ventricular function.  Doppler of the inflow cannula and outflow graft are normal.  RV is severely dilated. Severely reduced right ventricular function.  Mild TR is present.  The aortic valve is closed with mild regurgitation.  IVC is dilated and patient is on a ventilator.  No pericardial effusion present.  _____________________________________________________________________________  __     _____________________________________________________________________________  __     MMode/2D Measurements & Calculations  IVSd: 1.2 cm  LVIDd: 3.0 cm  LVIDs: 2.8 cm  LVPWd: 1.0 cm  FS: 7.4 %  LV mass(C)d: 101.5 grams  LV mass(C)dI: 48.1 grams/m2  RWT: 0.68        Doppler Measurements & Calculations  PA acc time: 0.12 sec     _____________________________________________________________________________  __           Report approved by: Graciela Caballero 2021 09:13 AM      Echocardiogram Limited    Narrative    [id number]  [id number]  [id number]  [id number]^ROMAINE^BRANNON           [facility],[place]  Echocardiography Laboratory  [address] [place] [id number]     Name: WOLFGANG LEACH  MRN: [id number]  : 1955  Study Date: 2021 05:14 PM  Age: 65 yrs  Gender: Male  Patient Location: [facility]  Reason For Study: CHF  Ordering Physician: BRANNON GAVIN  Referring Physician: SYSTEM, PROVIDER NOT IN  Performed By: KHOA ZAMBRANO      _____________________________________________________________________________  __        Procedure  Limited Portable Echo Adult.  _____________________________________________________________________________  __        Interpretation Summary  Severe biventricular dysfunction.  Mild aortic insufficiency.  Mild tricuspid regurgitation.  IVC plethoric.  No pericardial effusion present.  _____________________________________________________________________________  __     _____________________________________________________________________________  __                          Report approved by: Graciela Caballero 2021 08:57 AM              _____________________________________________________________________________  __      Echocardiogram Limited    Narrative    [id number]  [id number]  [id number]  [id number]^MEAGAN           [facility],[place]  Echocardiography Laboratory  [address] [facility] [place] [address]     Name: WOLFGANG LEACH KERI  MRN: [id number]  : 1955  Study Date: 2021 07:32 AM  Age: 65 yrs  Gender: Male  Patient Location: [facility]  Reason For Study: Cardiomyopathy  Ordering Physician: TIERRA CONTEH  Referring Physician: SYSTEM, PROVIDER NOT IN  Performed By: Kari Wang RDCS     BSA: 2.1 m2  Height: 67 in  Weight: 213 lb  HR: 114  BP: 115/88 mmHg  _____________________________________________________________________________  __        Procedure  LVAD Echocardiogram with two-dimensional, color and spectral Doppler  performed.  _____________________________________________________________________________  __        Interpretation Summary  Technically difficult study.     HM III LVAD is in place at 5500 RPM. Left ventricular diastolic diameter is  4.3cm with a midline septum that has abnormal septal motion consistent with  prior sternotomy and/or conduction abnormalities. The aortic valve opens  intermitently and has mild aortic regurgitation. The LVAD  inflow velocities  are not well seen but out flow velocities are normal.     The left ventricular ejection fraction is difficult to quantify due to  difficulty of images but is grossly estimated at 15-20%.     Due to technically difficult images quantification of the right ventricle is  not possible however the right ventricular function appears at least moderate  to severely reduced and dilated grossly.     There is at least mild mitral regurgitation that is eccentric.     IVC diameter >2.1 cm collapsing <50% with sniff suggests a high RA pressure  estimated at 15 mmHg or greater.     Compared to prior imaging on 2/16 there is no significant changes noted though  inflow velocities are not well seen on todays' exam.  _____________________________________________________________________________  __        Left Ventricle  Left ventricular ejection fraction is difficult to quantify due to difficulty  of images but is grossly estimated at 15-20%. Unable to comment on wall  motion. Diastolic diameter is 4.3cm. Diastolic function not assessed due to  presence of LVAD. HM III LVAD is in place at 5500 RPM. Abnormal non-specific  septal motion is present. LVAD cannula was seen in the expected anatomic  position in the LV apex. LVAD outflow velocities are normal.     Right Ventricle  Moderate to severe right ventricular dilation is present. Due to technically  difficult images quantification of the right ventricle is not possible however  the right ventricular function appears at least moderate to severely reduced  and dilated grossly. Global right ventricular function is moderately reduced.  A pacemaker lead is noted in the right ventricle.     Atria  The atria cannot be assessed.     Mitral Valve  The mitral valve is normal. Mild mitral insufficiency is present. eccentric  mitral regurgitation.        Aortic Valve  Mild aortic insufficiency is present. Aortic valve opens intermitently.     Tricuspid Valve  Tricuspid valve  not well seen but was assesed with Doppler interrogation.  Trace tricuspid insufficiency is present. The peak velocity of the tricuspid  regurgitant jet is not obtainable.     Pulmonic Valve  pulmoniv valve is not well seen but appears to have at least mild pulmonic  regurgitation.     Vessels  IVC diameter >2.1 cm collapsing <50% with sniff suggests a high RA pressure  estimated at 15 mmHg or greater.     Compared to Previous Study   No significant changes are noted though inflow velocities are not well  seen on todays' exam.     _____________________________________________________________________________  __                       Report approved by: Graciela Ragland 2021 08:47 AM                 _____________________________________________________________________________  __      Transesophageal Echocardiogram    Narrative    [id number]  [facility]  [id number]  [id number]^MANDEEP MACARIO^COLE^AGUSTIN           [facility],[place]  Echocardiography Laboratory  [address] [place] [address]        Name: WOLFGANG LEACH  MRN: [id number]  : 1955  Study Date: 2021 08:54 AM  Age: 65 yrs  Gender: Male  Patient Location: [facility]  Reason For Study: Afib  Ordering Physician: COLE DARLING  Referring Physician: SYSTEM, PROVIDER NOT IN  Performed By: Ambrosio Saravia MD     BSA: 2.1 m2  Height: 67 in  Weight: 218 lb  HR: 106  BP: 119/45 mmHg  _____________________________________________________________________________  __     Interpretation Summary  The left atrial appendage is normal. It is free of spontaneous echo contrast  and thrombus.  Normal LVAD inflow doppler.  AoV opens partially with each beat.  Mild to moderate aortic insufficiency is present.  Mild to moderate mitral insufficiency is present.  No pericardial effusion is present.  Global right ventricular function is severely reduced.  No change from prior.      _____________________________________________________________________________  __        Procedure  The procedure was performed in the Echo Lab. Informed consent for  Transesophegeal echo obtained. CHAMP Probe #61 was used during the procedure.  Patient was sedated using Fentanyl 75 mcg. Patient was sedated using Versed  1.5 mg. The heart rate, respiratory rate, oxygen saturations, blood pressure,  and response to care were monitored throughout the procedure with the  assistance of the nurse. I determined this patient to be an appropriate  candidate for the planned sedation and procedure and have reassessed the  patient immediately prior to sedation and procedure. Total sedation time: 18  minutes of continuous bedside 1:1 monitoring. The Transducer was inserted  without difficulty . The patient tolerated the procedure well. Complications  None.     Left Ventricle  Mild left ventricular dilation is present. Severely (EF 10-20%) reduced left  ventricular function is present. Severe diffuse hypokinesis is present. Left  ventricular wall thickness is normal.     Right Ventricle  Moderate to severe right ventricular dilation is present. Global right  ventricular function is severely reduced. A pacemaker lead is noted in the  right ventricle.     Atria  The left atrial appendage is normal. It is free of spontaneous echo contrast  and thrombus. Severe biatrial enlargement is present. The atrial septum is  intact as assessed by color Doppler .        Mitral Valve  Mild to moderate mitral insufficiency is present.     Aortic Valve  The aortic valve is tricuspid. Mild aortic valve sclerosis is present. Mild to  moderate aortic insufficiency is present.     Tricuspid Valve  Mild tricuspid insufficiency is present.     Pulmonic Valve  Trace to mild pulmonic insufficiency is present.     Vessels  The aorta root is normal.     Pericardium  No pericardial effusion is present.         _____________________________________________________________________________  __                    Report approved by: Graciela Dubose 03/03/2021 11:31 AM              _____________________________________________________________________________  __      Cardiac Device Check - Inpatient     Value    Date Time Interrogation Session [id number]    Implantable Pulse Generator  Medtronic    Implantable Pulse Generator Model [id number] Visia AF MRI VR    Implantable Pulse Generator Serial Number [id number]    Type Interrogation Session In Clinic    Clinic Name [facility]    Implantable Pulse Generator Type Defibrillator    Implantable Pulse Generator Implant Date 20200316    Implantable Lead  Medtronic    Implantable Lead Model 6935M Sprint Quattro Secure S MRI SureScan    Implantable Lead Serial Number [id number]    Implantable Lead Implant Date 20200316    Implantable Lead Polarity Type Tripolar Lead    Implantable Lead Location Detail 1 UNKNOWN    Implantable Lead Special Function 62cm length    Implantable Lead Location Right Ventricle    Glenn Setting Mode (NBG Code) VVI    Glenn Setting Lower Rate Limit 40    Glenn Setting Hysterisis Rate DISABLED    Lead Channel Setting Sensing Polarity Bipolar    Lead Channel Setting Sensing Anode Location Right Ventricle    Lead Channel Setting Sensing Anode Terminal Ring    Lead Channel Setting Sensing Cathode Location Right Ventricle    Lead Channel Setting Sensing Cathode Terminal Tip    Lead Channel Setting Sensing Sensitivity 0.3    Lead Channel Setting Pacing Polarity Bipolar    Lead Channel Setting Pacing Anode Location Right Ventricle    Lead Channel Setting Pacing Anode Terminal Ring    Lead Channel Setting Sensing Cathode Location Right Ventricle    Lead Channel Setting Sensing Cathode Terminal Tip    Lead Channel Setting Pacing Pulse Width 0.4    Lead Channel Setting Pacing Amplitude 2    Lead Channel Setting  Pacing Capture Mode Adaptive    Zone Setting Type Category VF    Zone Setting Detection Interval 270    Zone Setting Detection Beats Numerator 30    Zone Setting Detection Beats Denominator 40    Zone Setting Type Category VT    Zone Setting Detection Interval Blank    Zone Setting Type Category VT    Zone Setting Detection Interval 460    Zone Setting Type Category VT    Zone Setting Detection Interval 500    Zone Setting Type Category ATRIAL_FIBRILLATION    Zone Setting Type Category AT/AF    Lead Channel Impedance Value 361    Lead Channel Impedance Value 285    Lead Channel Sensing Intrinsic Amplitude 8.75    Lead Channel Sensing Intrinsic Amplitude 9.5    Lead Channel Pacing Threshold Amplitude 0.75    Lead Channel Pacing Threshold Pulse Width 0.4    Battery Date Time of Measurements 20210216113021    Battery Status OK    Battery RRT Trigger 2.727    Battery Remaining Longevity 133    Battery Voltage 3.01    Capacitor Charge Type Reformation    Capacitor Last Charge Date Time 74022630074088    Capacitor Charge Time 3.823    Capacitor Charge Energy 18    Glenn Statistic Date Time Start 46077516694695    Glenn Statistic Date Time End 20210216112902    Glenn Statistic RV Percent Paced 0.01    Atrial Tachy Statistic Date Time Start 71932680853650    Atrial Tachy Statistic Date Time End 20210216112902    Atrial Tachy Statistic AT/AF [place] Percent 2.6    Therapy Statistic Recent Shocks Delivered 0    Therapy Statistic Recent Shocks Aborted 0    Therapy Statistic Recent ATP Delivered 0    Therapy Statistic Recent Date Time Start 50755393416769    Therapy Statistic Recent Date Time End 20210216112902    Therapy Statistic Total Shocks Delivered 0    Therapy Statistic Total Shocks Aborted 0    Therapy Statistic Total ATP Delivered 0    Therapy Statistic Total  Date Time Start 07161252657532    Therapy Statistic Total  Date Time End 20210216112902    Episode Statistic Recent Count 2    Episode Statistic Type Category  AT/AF    Episode Statistic Recent Count 0    Episode Statistic Type Category SVT    Episode Statistic Recent Count 0    Episode Statistic Type Category VT    Episode Statistic Recent Count 0    Episode Statistic Type Category VF    Episode Statistic Recent Count 0    Episode Statistic Type Category VT    Episode Statistic Recent Count 0    Episode Statistic Type Category VT    Episode Statistic Recent Count 0    Episode Statistic Type Category VT    Episode Statistic Recent Date Time Start 98935287948222    Episode Statistic Recent Date Time End 29717958558585    Episode Statistic Recent Date Time Start 99199750688543    Episode Statistic Recent Date Time End 11224421546118    Episode Statistic Recent Date Time Start 37324936594707    Episode Statistic Recent Date Time End 57426450148503    Episode Statistic Recent Date Time Start 50131527409923    Episode Statistic Recent Date Time End 31863720167957    Episode Statistic Recent Date Time Start 07372836754847    Episode Statistic Recent Date Time End 47714741904268    Episode Statistic Recent Date Time Start 18478795265148    Episode Statistic Recent Date Time End 21660636510115    Episode Statistic Recent Date Time Start 02877665149323    Episode Statistic Recent Date Time End 92270441419085    Episode Statistic Total Count 225    Episode Statistic Type Category AT/AF    Episode Statistic Total Count 0    Episode Statistic Type Category SVT    Episode Statistic Total Count 0    Episode Statistic Type Category VT    Episode Statistic Total Count 0    Episode Statistic Type Category VF    Episode Statistic Total Count 0    Episode Statistic Type Category VT    Episode Statistic Total Count 0    Episode Statistic Type Category VT    Episode Statistic Total Count 0    Episode Statistic Type Category VT    Episode Statistic Total Date Time Start 65463640489375    Episode Statistic Total Date Time End 22024700986175    Episode Statistic Total Date Time Start  97038030234618    Episode Statistic Total Date Time End 20210216112902    Episode Statistic Total Date Time Start 20200316145425    Episode Statistic Total Date Time End 20210216112902    Episode Statistic Total Date Time Start 20200316145425    Episode Statistic Total Date Time End 20210216112902    Episode Statistic Total Date Time Start 20200316145425    Episode Statistic Total Date Time End 20210216112902    Episode Statistic Total Date Time Start 20200316145425    Episode Statistic Total Date Time End 20210216112902    Episode Statistic Total Date Time Start 20200316145425    Episode Statistic Total Date Time End 20210216112902    Episode Identifier 225    Episode Type Category Monitor    Episode Date Time 06531729831290    Episode Duration 480    Episode Identifier 224    Episode Type Category Monitor    Episode Date Time 20210216025925    Episode Duration 1,080    Episode Identifier 223    Episode Type Category Monitor    Episode Date Time 20210210011325    Episode Duration 720    Narrative    Pt seen on 4E for evaluation and iterative programming of a Medtronic,   single lead ICD, per MD orders. Presenting EGM= regular VS @ 95 bpm.   Normal ICD function. 2 episodes recorded as AF. The EGMs show slightly   irregular R-R intervals. OptiVol thoracic impedance suggests fluid   retention.  <0.1%. No short v-v intervals recorded. Lead trends appear   stable. Battery estimates 11 years to PERLA. VT monitor zone changed from   176 bpm to 120 bpm to assess for ventricular arrhythmias. KRISTEN Dumont.    Single lead ICD    I have reviewed and interpreted the device interrogation, settings,   programming and nurse's summary. The device is functioning within normal   device parameters. I agree with the current findings, assessment and plan.     CONSULTATIONS:   1. Nephrology    HOSPITAL COURSE:   Mixed Cardiogenic and septic shock, resolved. Acute hypoxic respiratory failure secondary to Legionella PNA. Presented in  acute hypoxia, febrile with CT chest consistent with bilateral infiltrates. WBC 24.5, Procal 7.95, , Lactacte 6.9. Legionella antigen positive. COVID negative with positive antibodies. Completed IV antibiotics (cefazolin ended 3/1 and azithromycin ended 2/25). Component of cardiogenic shock with rise in LDH to 8531.Remains stable on room air at time of discharge. Given acute illness following initial COVID vaccination with positive antibodies to COVID defer second vaccine per Dr. Cisneros.      Chronic SCHF s/p HM III LVAD. Elevated LDH in setting of infection, resolved. Implanted 2/18/20.   Stage D, NYHA Class III  ACEi/ARB Lisinopril held in setting of renal failure. Hydralazine 100 mg po TID.   BB contraindicated due to low cardiac output  Aldosterone antagonist contraindicated due to renal dysfunction  SCD prophylaxis ICD  Fluid status euvolemic. bumex 3 mg po daily.   MAP: 8104. Initiated Norvasc 2.5 mg po daily at discharge.   LDH: 283 3/15/21  Anticoagulation: Coumadin per pharmacy. INR-2.34, Goal 2-3. Coumadin 4 mg po daily with repeat INR on Friday.   Antiplatelet: ASA 81 mg po daily       The patient's HeartMate LVAD was interrogated 3/17/2021  * Speed 5500 rpm   * Pulsatility index 4.4  * Power 4.3 Driver   * Flow 4.1 L/minute   Fluid status: Euvolemic   Alarms were reviewed, and notable for rare PI events.   The driveline exit site was covered with dressing clean, dry, and intact.   All external components were inspected and showed no evidence of damage or malfunction.     Acute on Chronic CKD Stave IV secondary to shock. Cr peaked at 7.1. Tunneled line placed with intermittent HD. Last HD run 3/10. Appreciate Nephrology consult. Tunneled line extracted 3/16. Follow up with outpatient Nephrology with weekly labs. Cr stable at 2.0. Repeat CMP Friday 3/19.     Wide Complex Tachycardia. History of Afib s/p DCCV and aflutter. Did not respond to Adenosine on admission. Underlying atrial flutter with 3:1.  S/p CHAMP and DCCV, in SR currently. Continue Amiodarone.      Chronic microcytic/hypochromic anemia. Coagulopathy related to sepsis, resolved. 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 transfuse < 7. Hbg-9. Follow up with [facility] (Dr. Ibarra) as recommended prior to admission.      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 Platelets 250K --> ~ 90K with documented history of exposure to unfractionate heparin products at OSH prior to his installation at the [facility]. HIT panel tests at that time with first negative and second antibody test was positive. No known thrombosis events. DAVINA antibody negative. Per Dr. James with hematology no other cause for isolated thrombocytopenia. Immediate recovery of plts following d/c of heparin. 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 Avoiding heparin products. Continue warfarin as above.      DM Type II, controlled. A1C 6.8 1/6/21. Resume 10 units Basaglar at discharge. Follow up with PCP in 1 week.      BPH: continue pta flomax and finesteride  GERD: continue pta Prilosec  Gout: continue pta allopurinol  Mood disorder: continue pta Prozac    DISCHARGE MEDICATIONS:  Discharge Medication List as of 3/17/2021  9:38 AM      START taking these medications    Details   amLODIPine (NORVASC) 2.5 MG tablet Take 1 tablet (2.5 mg) by mouth daily, Disp-30 tablet, R-1, E-Prescribe      multivitamin RENAL (NEPHROCAPS/TRIPHROCAPS) 1 MG capsule Take 1 capsule by mouth daily, Disp-30 capsule, R-1, E-Prescribe      potassium chloride ER (KLOR-CON M) 20 MEQ CR tablet Take 2 tablets (40 mEq) by mouth 2 times daily, Disp-120 tablet, R-1, E-Prescribe         CONTINUE these medications which have CHANGED    Details   atorvastatin (LIPITOR) 20 MG tablet Take 1 tablet (20 mg) by mouth daily, Disp-30 tablet, R-1, E-Prescribe      bumetanide (BUMEX) 1 MG tablet Take 3 tablets (3 mg) by mouth daily, Disp-90 tablet, R-1, E-Prescribe         CONTINUE these medications which have  NOT CHANGED    Details   allopurinol (ZYLOPRIM) 100 MG tablet 2 tablets (200 mg) by Oral or Feeding Tube route daily, Disp-60 tablet,R-1, E-Prescribe      amiodarone (PACERONE) 200 MG tablet Take 1 tablet (200 mg) by mouth daily, Disp-90 tablet,R-3, E-Prescribe      aspirin (ASA) 81 MG EC tablet Take 1 tablet (81 mg) by mouth daily, Disp-90 tablet,R-3, E-Prescribe      cephALEXin (KEFLEX) 500 MG capsule Take 1 capsule (500 mg) by mouth 4 times daily, Disp-120 capsule, R-1, E-Prescribe      co-enzyme Q-10 200 MG CAPS 200 mg by Oral or Feeding Tube route daily, Historical      finasteride (PROSCAR) 5 MG tablet Take 1 tablet (5 mg) by mouth daily Helps with urinary retention., Disp-30 tablet, R-0, E-Prescribe      FLUoxetine (PROZAC) 10 MG capsule Take 30 mg by mouth daily, Historical      hydrALAZINE (APRESOLINE) 50 MG tablet Take 2 tablets (100 mg) by mouth 3 times daily, Disp-560 tablet, R-3, E-Prescribe      insulin glargine (BASAGLAR KWIKPEN) 100 UNIT/ML pen Inject 10 Units Subcutaneous At Bedtime , HistoricalIf Basaglar is not covered by insurance, may substitute Lantus at same dose and frequency.        insulin pen needle (BD JAIME U/F) 32G X 4 MM miscellaneous Historical      magnesium oxide (MAG-OX) 400 MG tablet 1 tablet (400 mg) by Oral or Feeding Tube route daily, Disp-30 tablet,R-1, E-Prescribe      nitroGLYcerin (NITROSTAT) 0.4 MG sublingual tablet For chest pain place 1 tablet under the tongue every 5 minutes for 3 doses. If symptoms persist 5 minutes after 1st dose call 911., Disp-30 tablet, R-0, E-Prescribe      omeprazole (PRILOSEC) 20 MG DR capsule Take 20 mg by mouth daily, Historical      senna-docusate (SENOKOT-S/PERICOLACE) 8.6-50 MG tablet Take 1 tablet by mouth 2 times daily as needed for constipation, Historical      tamsulosin (FLOMAX) 0.4 MG capsule Take 1 capsule (0.4 mg) by mouth daily This med helps with urinary retention, Disp-30 capsule, R-0, E-Prescribe      warfarin ANTICOAGULANT  (COUMADIN) 2 MG tablet Take 4 mg by mouth daily , Historical      zolpidem (AMBIEN) 5 MG tablet Take 5 mg by mouth nightly as needed for Insomnia, Historical         STOP taking these medications       acetaminophen (TYLENOL) 325 MG tablet Comments:   Reason for Stopping:         lisinopril (ZESTRIL) 10 MG tablet Comments:   Reason for Stopping:         multivitamin w/minerals (THERA-VIT-M) tablet Comments:   Reason for Stopping:               DISCHARGE DISPOSITION: Eliseo Tanner will discharge to home in stable condition.     DISCHARGE INSTRUCTIONS:  Discharge Procedure Orders   Medication Therapy Management Referral   Referral Priority: Routine Referral Type: Med Therapy Management   Requested Specialty: Pharmacist   Number of Visits Requested: 1     Reason for your hospital stay   Order Comments: You were admitted to the hospital for an infection in your lungs and underwent aggressive treatment including antibiotics. Please contact your LVAD coordinator for fever, chills, worsening cough, increased shortness of breath, increased swelling in your feet, and weight gain of 3 lbs overnight or 5 lbs in a week.     Activity   Order Comments: Your activity upon discharge: activity as tolerated     Order Specific Question Answer Comments   Is discharge order? Yes      Wound care and dressings   Order Comments: Instructions to care for your wound at home: LVAD drive line dressing change as prior to admission.     Adult [facility] Follow-up and recommended labs and tests   Order Comments: Follow up in Cardiology clinic in 1-2 weeks.   Follow up with Nephrology in 1-2 weeks.   Repeat CMP, CBC, INR, and LDH on Friday 3/19/21.    Appointments on [place] and/or [facility] (with [facility] or [facility] provider or service). Call [phone] if you haven't heard regarding these appointments within 7 days of discharge.     Full Code     Order Specific Question Answer Comments   Code status determined by: Discussion with  patient/ legal decision maker      Diet   Order Comments: Follow this diet upon discharge: -2 gram sodium diet     Order Specific Question Answer Comments   Is discharge order? Yes        45 minutes spent in discharge, including >50% in counseling and coordination of care, medication review and plan of care recommended on follow up. Please do not hesitate to contact me should there be questions regarding the hospital course or discharge plan.      JUAN Palacio CNP FNP-C  3/17/2021  [phone]

## 2021-03-17 NOTE — PHARMACY-ANTICOAGULATION SERVICE
Clinical Pharmacy- Warfarin Discharge Note  This patient is currently on warfarin for the treatment of LVAD.  INR Goal= 2-3  Expected length of therapy lifetime.    Warfarin PTA Regimen: 6 mg Mon, 4 mg ROW      Anticoagulation Dose History     Recent Dosing and Labs Latest Ref Rng & Units 3/11/2021 3/12/2021 3/13/2021 3/14/2021 3/15/2021 3/16/2021 3/17/2021    REYMUNDOZ IMS TEMPLATE - 3.5 mg 3.5 mg - - - - -    Warfarin 4 mg - - - 4 mg 4 mg 4 mg 4 mg -    INR 0.86 - 1.14 2.60(H) 2.59(H) 2.45(H) 2.53(H) 2.53(H) 2.44(H) 2.34(H)    Chromogenic Factor 10 70 - 130 % - - - - - - -          Vitamin K doses administered during the last 7 days: None  FFP administered during the last 7 days: None  Recommend discharging the patient on a warfarin regimen of 4 mg daily with a prescription for warfarin 4mg tablets.      The patient should have an INR checked March / 22 / 2021.    Christ Lacey  P4 [employer] Student

## 2021-03-18 NOTE — PROGRESS NOTES
Patient has LVAD (left ventricular assist device) in place and should be follow up by VAD RNCC. No post-hospital follow up call is needed at this time.    Pavithra Adair CMA  Post Hospital Discharge Team

## 2021-03-19 LAB
ALBUMIN SERPL-MCNC: 4.4 G/DL
ALP SERPL-CCNC: 138 U/L
ALT SERPL-CCNC: 35 U/L
ANION GAP SERPL CALCULATED.3IONS-SCNC: 8 MMOL/L
AST SERPL-CCNC: 60 U/L
BASOPHILS NFR BLD AUTO: 0.4 %
BILIRUB SERPL-MCNC: 0.5 MG/DL
BUN SERPL-MCNC: 59 MG/DL
CALCIUM SERPL-MCNC: 9.1 MG/DL
CHLORIDE SERPLBLD-SCNC: 98 MMOL/L
CO2 SERPL-SCNC: 32 MMOL/L
CREAT SERPL-MCNC: 2.1 MG/DL
D DIMER PPP FEU-MCNC: 4940 NG/DL (ref 0–500)
EOSINOPHIL NFR BLD AUTO: 2.8 %
ERYTHROCYTE [DISTWIDTH] IN BLOOD BY AUTOMATED COUNT: 22.8 %
GFR SERPL CREATININE-BSD FRML MDRD: 34 ML/MIN/1.73M2
GLUCOSE SERPL-MCNC: 118 MG/DL (ref 70–99)
HCT VFR BLD AUTO: 32.2 %
HEMOGLOBIN: 10.1 G/DL (ref 13.3–17.7)
INR PPP: 2.5 (ref 0.9–1.1)
INR PPP: 2.5 (ref 0.9–1.1)
LDH SERPL-CCNC: 336 U/L
LYMPHOCYTES NFR BLD AUTO: 21.7 %
MCH RBC QN AUTO: 26 PG
MCHC RBC AUTO-ENTMCNC: 31.4 G/DL
MCV RBC AUTO: 83 FL
MONOCYTES NFR BLD AUTO: 9.4 %
NEUTROPHILS NFR BLD AUTO: 65.7 %
PLATELET COUNT - QUEST: 351 10^9/L (ref 150–450)
POTASSIUM SERPL-SCNC: 4 MMOL/L
PROT SERPL-MCNC: 8.8 G/DL
RBC # BLD AUTO: 3.88 10^12/L
SODIUM SERPL-SCNC: 138 MMOL/L
WBC # BLD AUTO: 7.9 10^9/L

## 2021-03-20 NOTE — PATIENT INSTRUCTIONS
Medication changes:   - None today, waiting for labs    Instructions  - Write down your blood pressures  - Call us in 1 week with these results, we will see if there is anythign we can change in your medications    Follow-up  1. Dr. Cisneros and EP in august as scheduled  2. If needed we will add on an appointment with Karolina before then  3. Will decide on follow-up labs when the labs come back today.  4. Call us with your blood pressures in a week  5. If you decide to go to [place]   1 or 2

## 2021-03-22 ENCOUNTER — ANTICOAGULATION THERAPY VISIT (OUTPATIENT)
Dept: ANTICOAGULATION | Facility: CLINIC | Age: 66
End: 2021-03-22

## 2021-03-22 DIAGNOSIS — I50.22 CHRONIC SYSTOLIC CONGESTIVE HEART FAILURE (H): ICD-10-CM

## 2021-03-22 DIAGNOSIS — I48.0 PAROXYSMAL ATRIAL FIBRILLATION (H): ICD-10-CM

## 2021-03-22 DIAGNOSIS — Z95.811 LVAD (LEFT VENTRICULAR ASSIST DEVICE) PRESENT (H): ICD-10-CM

## 2021-03-22 NOTE — PROGRESS NOTES
ANTICOAGULATION FOLLOW-UP CLINIC VISIT    Patient Name:  Eliseo Tanner  Date:  3/22/2021  Contact Type:  Telephone    SUBJECTIVE:  Patient Findings     Comments:  Recently discharged from the hospital.  Reviewed Warfarin dosing with patient.  Updated calendar.  Eliseo takes 4mg each day.  The result from 3/19 was received in the [facility] on 3/22.        Clinical Outcomes     Comments:  Recently discharged from the hospital.  Reviewed Warfarin dosing with patient.  Updated calendar.  Eliseo takes 4mg each day.  The result from 3/19 was received in the [facility] on 3/22.           OBJECTIVE    Recent labs: (last 7 days)     21   INR 2.5*       ASSESSMENT / PLAN  INR assessment THER    Recheck INR In: 1 WEEK    INR Location Outside lab      Anticoagulation Summary  As of 3/22/2021    INR goal:  2.0-3.0   TTR:  89.5 % (10.2 mo)   INR used for dosin.5 (3/19/2021)   Warfarin maintenance plan:  4 mg (4 mg x 1) every day   Full warfarin instructions:  4 mg every day   Weekly warfarin total:  28 mg   Plan last modified:  Young Shine RN (3/22/2021)   Next INR check:  3/26/2021   Priority:  Critical   Target end date:  Indefinite    Indications    LVAD (left ventricular assist device) present (H) [Z95.811]  Chronic systolic congestive heart failure (H) [I50.22]  Paroxysmal atrial fibrillation (H) [I48.0]             Anticoagulation Episode Summary     INR check location:      Preferred lab:      Send INR reminders to:  [facility]    Comments:  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  HM 3  placed 2020, 81mg ASA, Speak to spouse, Veronique at [phone]  2020:  Lab: [facility]:  ph: [phone] opt 5;   fax: [phone]      Anticoagulation Care Providers     Provider Role Specialty Phone number    Nader Cisneros MD Referring Advanced Heart Failure and Transplant Cardiology [phone]            See the Encounter Report to view  "Anticoagulation Flowsheet and Dosing Calendar (Go to Encounters tab in chart review, and find the Anticoagulation Therapy Visit)    INR/CFX/F2 RESULT:INR result is 2.5 on 3/19 at outside lab    ASSESSMENT:Spoke with patient. Gave them their lab results and new warfarin recommendation.  No changes in health, medication, or diet. No missed doses, no falls. No signs or symptoms of bleed or clotting. Per patient, \"it feels so good to be home\".    DOSING ADJUSTMENT:4mg daily    NEXT INR/FACTOR X OR FACTOR II:3/26 with other labs at outside lab    PROTOCOL FOLLOWED:LVAD goal 2-3  Patient had LVAD placed on:   2/18/20  Type of LVAD: HM 3  Patient's current Aspirin dose: 81mg  LVAD Protocol followed:  Yes   If Not Followed Explanation:  Young Glez RN                 "

## 2021-03-23 ENCOUNTER — CARE COORDINATION (OUTPATIENT)
Dept: CARDIOLOGY | Facility: CLINIC | Age: 66
End: 2021-03-23

## 2021-03-23 NOTE — PROGRESS NOTES
Called pt to check in post hospitalization.  Discussed plan for follow up with: nephrology, infectious disease, [facility], our cards team and primary MD.  Labs were drawn as requested on Friday.  Message sent to ID clinic team to arrange follow up appt.  Chapis advised that she is working on arranging the appointments with Dr. Kay Avila and Dr. Vila in [place].  Pt arranged to see Laine ALEGRIA NP, virtually,  this Thursday.   Pt reports feeling much better since discharge.  He is sleeping well, moving around the house without assistance.  LVAD numbers and weights have been stable.    Will follow up with pt after appt Thursday.  Pt will call vad coordinator oncall with questions or concerns.

## 2021-03-25 ENCOUNTER — TELEPHONE (OUTPATIENT)
Dept: ANTICOAGULATION | Facility: CLINIC | Age: 66
End: 2021-03-25

## 2021-03-25 ENCOUNTER — VIRTUAL VISIT (OUTPATIENT)
Dept: CARDIOLOGY | Facility: CLINIC | Age: 66
End: 2021-03-25
Attending: INTERNAL MEDICINE
Payer: MEDICARE

## 2021-03-25 DIAGNOSIS — I50.22 CHRONIC SYSTOLIC CONGESTIVE HEART FAILURE (H): ICD-10-CM

## 2021-03-25 DIAGNOSIS — Z95.811 LVAD (LEFT VENTRICULAR ASSIST DEVICE) PRESENT (H): Primary | ICD-10-CM

## 2021-03-25 DIAGNOSIS — I48.0 PAROXYSMAL ATRIAL FIBRILLATION (H): ICD-10-CM

## 2021-03-25 DIAGNOSIS — Z95.811 LVAD (LEFT VENTRICULAR ASSIST DEVICE) PRESENT (H): ICD-10-CM

## 2021-03-25 PROCEDURE — 99214 OFFICE O/P EST MOD 30 MIN: CPT | Mod: 95 | Performed by: NURSE PRACTITIONER

## 2021-03-25 NOTE — TELEPHONE ENCOUNTER
Patient is going to be seen at the [facility] in [facility].  Patient needs orders faxed.  (p)[phone]  (f) [phone]

## 2021-03-25 NOTE — PROGRESS NOTES
Start time: 1042  End time: 1052  Platform: [employer]  Patient location: Home    HPI:   Eliseo Tanner is a 65 year old male with NICM (LVEF 15-20%), s/p HM III 2/18/20 (post op c/b retrosternal hematoma with bleeding in the lungs), s/p ICD, h/o MSSA driveline infection and bacteremia 11/5/20 on prophylactic cephalexin, h/o VT on amiodarone, nonobstructive CAD, severe MR, atrial fibrillation on warfarin, HTN, ABHINAV requiring CPAP, CKD Stage IV, DM Type II, Hyperlipidemia, and history of HIT. He underwent hospitalization from 2/15/21-3/17/21 at [facility] for mixed cardiogenic and distributive shock with an intermittent wide complex tachycardia. Initially requiring vasopressors and inotropes, intubated for hypoxemia. He was treated for legionella pneumonia and since extubated. Transitioned to iHD with improving renal status with cession of HD 3/10. He presents to clinic for hospital follow up.     He notes mild dizziness with standing quickly as he gets out of the car. He notes bruising over his right eye, but denies headache or vision changes. He notes mild bleeding with scant drainage to his drive line site, bleeding has since resolved. He denies changes in speech, fever, chills, chest pain, SOB, MIRANDA, PND, cough, nausea, vomiting, diarrhea, melena, hematochezia, and LE edema.  His weight remains stable at 193-194 lbs. He continues to maintain a low sodium diet. He is feeling much better over all since he returned home.     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    SOCIAL  HISTORY:  Social History     Socioeconomic History     Marital status:      Spouse name: None     Number of children: None     Years of education: None     Highest education level: None   Occupational History     None   Social Needs     Financial resource strain: None     Food insecurity     Worry: None     Inability: None     Transportation needs     Medical: None     Non-medical: None   Tobacco Use     Smoking status: Former Smoker     Quit date: 1994     Years since quittin.9     Smokeless tobacco: Never Used   Substance and Sexual Activity     Alcohol use: None     Drug use: None     Sexual activity: None   Lifestyle     Physical activity     Days per week: None     Minutes per session: None     Stress: None   Relationships     Social connections     Talks on phone: None     Gets together: None     Attends Sikhism service: None     Active member of club or organization: None     Attends meetings of clubs or organizations: None     Relationship status: None     Intimate partner violence     Fear of current or ex partner: None     Emotionally abused: None     Physically abused: None     Forced sexual activity: None   Other Topics Concern     None   Social History Narrative     None       CURRENT MEDICATIONS:  Outpatient Medications Prior to Visit   Medication Sig Dispense Refill     allopurinol (ZYLOPRIM) 100 MG tablet 2 tablets (200 mg) by Oral or Feeding Tube route daily 60 tablet 1     amiodarone (PACERONE) 200 MG tablet Take 1 tablet (200 mg) by mouth daily 90 tablet 3     amLODIPine (NORVASC) 2.5 MG tablet Take 1 tablet (2.5 mg) by mouth daily 30 tablet 1     aspirin (ASA) 81 MG EC tablet Take 1 tablet (81 mg) by mouth daily 90 tablet 3     atorvastatin (LIPITOR) 20 MG tablet Take 1 tablet (20 mg) by mouth daily 30 tablet 1     bumetanide (BUMEX) 1 MG tablet Take 3 tablets (3 mg) by mouth daily 90 tablet 1     cephALEXin (KEFLEX) 500 MG capsule Take 1 capsule (500 mg) by mouth 4 times daily  120 capsule 1     co-enzyme Q-10 200 MG CAPS 200 mg by Oral or Feeding Tube route daily       finasteride (PROSCAR) 5 MG tablet Take 1 tablet (5 mg) by mouth daily Helps with urinary retention. 30 tablet 0     FLUoxetine (PROZAC) 10 MG capsule Take 30 mg by mouth daily       hydrALAZINE (APRESOLINE) 50 MG tablet Take 2 tablets (100 mg) by mouth 3 times daily 560 tablet 3     insulin glargine (BASAGLAR KWIKPEN) 100 UNIT/ML pen Inject 10 Units Subcutaneous At Bedtime        insulin pen needle (BD JAIME U/F) 32G X 4 MM miscellaneous        magnesium oxide (MAG-OX) 400 MG tablet 1 tablet (400 mg) by Oral or Feeding Tube route daily 30 tablet 1     multivitamin RENAL (NEPHROCAPS/TRIPHROCAPS) 1 MG capsule Take 1 capsule by mouth daily 30 capsule 1     nitroGLYcerin (NITROSTAT) 0.4 MG sublingual tablet For chest pain place 1 tablet under the tongue every 5 minutes for 3 doses. If symptoms persist 5 minutes after 1st dose call 911. 30 tablet 0     omeprazole (PRILOSEC) 20 MG DR capsule Take 20 mg by mouth daily       potassium chloride ER (KLOR-CON M) 20 MEQ CR tablet Take 2 tablets (40 mEq) by mouth 2 times daily 120 tablet 1     senna-docusate (SENOKOT-S/PERICOLACE) 8.6-50 MG tablet Take 1 tablet by mouth 2 times daily as needed for constipation       tamsulosin (FLOMAX) 0.4 MG capsule Take 1 capsule (0.4 mg) by mouth daily This med helps with urinary retention 30 capsule 0     warfarin ANTICOAGULANT (COUMADIN) 2 MG tablet Take 4 mg by mouth daily        zolpidem (AMBIEN) 5 MG tablet Take 5 mg by mouth nightly as needed for Insomnia       No facility-administered medications prior to visit.        ROS:   CONSTITUTIONAL: Denies fever, chills, fatigue, or weight fluctuations.   HEENT: Denies headache, vision changes, and changes in speech.   CV: Refer to HPI.   PULMONARY:Denies shortness of breath, cough, or previous TB exposure.   GI:Denies nausea, vomiting, diarrhea, and abdominal pain. Bowel movements are regular.    :Denies urinary alterations, dysuria, urinary frequency, hematuria, and abnormal drainage.   EXT:Denies lower extremity edema.   SKIN: Refer to HPI.   MUSCULOSKELETAL:Denies upper or lower extremity weakness and pain.   NEUROLOGIC:Denies lightheadedness, dizziness, seizures, or upper or lower extremity paresthesia.     Labs:  CBC RESULTS:  Lab Results   Component Value Date    WBC 7.9 03/19/2021    RBC 3.88 03/19/2021    HGB 10.1 (A) 03/19/2021    HCT 32.2 03/19/2021    MCV 83.0 03/19/2021    MCH 26.0 03/19/2021    MCHC 31.4 03/19/2021    RDW 22.8 03/19/2021     03/19/2021     03/17/2021       CMP RESULTS:  Lab Results   Component Value Date     03/19/2021    POTASSIUM 4.0 03/19/2021    CHLORIDE 98.0 03/19/2021    CO2 32.0 03/19/2021    ANIONGAP 8 03/19/2021     (A) 03/19/2021    BUN 59 03/19/2021    CR 2.1 03/19/2021    GFRESTIMATED 34 03/19/2021    GFRESTBLACK 39 (L) 03/17/2021    CAOLS 9.1 03/19/2021    BILITOTAL 0.5 03/19/2021    ALBUMIN 4.4 03/19/2021    ALKPHOS 138.0 03/19/2021    ALT 35 03/19/2021    AST 60.0 03/19/2021        INR RESULTS:  Lab Results   Component Value Date    INR 2.5 (A) 03/19/2021    INR 2.5 (A) 03/19/2021       Lab Results   Component Value Date    MAG 1.5 (L) 03/17/2021     Lab Results   Component Value Date    NTBNPI 27,781 (H) 02/15/2021     No results found for: NTBNP    Assessment and Plan:   Eliseo Tanner is a 65 year old male with NICM (LVEF 15-20%), s/p HM III 2/18/20 (post op c/b retrosternal hematoma with bleeding in the lungs), s/p ICD, h/o MSSA driveline infection and bacteremia 11/5/20 on prophylactic cephalexin, h/o VT on amiodarone, nonobstructive CAD, severe MR, atrial fibrillation on warfarin, HTN, ABHINAV requiring CPAP, CKD Stage IV, DM Type II, Hyperlipidemia, and history of HIT. He underwent hospitalization from 2/15/21-3/17/21 at [facility] for mixed cardiogenic and distributive shock with an intermittent wide complex tachycardia.  Initially requiring vasopressors and inotropes, intubated for hypoxemia. He was treated for legionella pneumonia and since extubated. Transitioned to iHD with improving renal status with cession of HD 3/10. He presents to clinic for hospital follow up and is doing well per history.      Mixed Cardiogenic and septic shock, resolved. Acute hypoxic respiratory failure secondary to Legionella PNA. Presented in acute hypoxia, febrile with CT chest consistent with bilateral infiltrates. WBC 24.5, Procal 7.95, , Lactacte 6.9. Legionella antigen positive. Completed IV antibiotics )cefazolin ended 3/1 and azithromycin ended 2/25). Component of cardiogenic shock with rise in LDH to 8531.  - Remains stable on room air.      Chronic SCHF s/p HM III LVAD. Elevated LDH in setting of infection, resolved. Implanted 2/18/20.   Stage D, NYHA Class III  ACEi/ARB Lisinopril held in setting of renal failure. Hydralazine 100 mg po TID.   BB contraindicated due to low cardiac output  Aldosterone antagonist contraindicated due to renal dysfunction  SCD prophylaxis ICD  Fluid status euvolemic per history. Continue bumex 3 mg po daily.   MAP: unable to obtain at home. Renal appointment in AM.   LDH: 336 3/19/21, weekly trend for 1 month.   Anticoagulation: Coumadin per pharmacy. INR-2.5 3/19, Goal 2-3.  Antiplatelet: ASA 81 mg po daily      Acute on Chronic CKD Stave IV secondary to shock. Cr peaked at 7.1. Last HD run 3/10. Tunneled line removed 3/16/21.  - Appreciate Nephrology outpatient management. He has an appointment in AM.      Wide Complex Tachycardia. History of Afib s/p DCCV and aflutter. Did not respond to Adenosine on admission. Underlying atrial flutter with 3:1. S/p CHAMP and DCCV, in SR currently.   - Continue Amiodarone.      Chronic microcytic/hypochromic anemia. Coagulopathy related to sepsis, resolved. IgG Kappa monoclonal Gammopathy of undetermined significance  - Hgb-10.1 3/19/21.  - follow up with [facility] (Dr. Ibarra) as recommended.      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 Platelets 250K --> ~ 90K with documented history of exposure to unfractionate heparin products at OSH prior to his installation at the [facility]. HIT panel tests at that time with first negative and second antibody test was positive. No known thrombosis events. DAVINA antibody negative. Per Dr. James with hematology no other cause for isolated thrombocytopenia. Immediate recovery of plts following d/c of heparin. 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   - avoiding heparin products  - continue warfarin     DM Type II, controlled.   A1C 6.8 1/6/21  - Management per PCP.     Follow up in Cardiology clinic in 1 month, consider Mariano Gomez appointment with Dr. Cisneros.     Laine Leon, APRN CNP  3/25/2021          CC  SELF, REFERRED

## 2021-03-25 NOTE — PATIENT INSTRUCTIONS
1. No med changes  2. Labs per renal sent to us for review. Being drawn at [facility]  3. Follow up in person in 1 month, consider Mariano Gomez with Dr. Cisneros.

## 2021-03-25 NOTE — PROGRESS NOTES
"Eliseo is a 65 year old who is being evaluated via a billable video visit.      How would you like to obtain your AVS? MyChart  If the video visit is dropped, the invitation should be resent by: Text to cell phone: [phone]  Will anyone else be joining your video visit? No      Vitals - Patient Reported  Weight (Patient Reported): 88 kg (194 lb)  Height (Patient Reported): 170.2 cm (5' 7\")  BMI (Based on Pt Reported Ht/Wt): 30.38  Pain Score: No Pain (0)(No SOB)      Vitals were taken and medications where reconciled.    Trever Ashby, EMT  10:00 AM    "

## 2021-03-25 NOTE — LETTER
"3/25/2021      RE: Eliseo Tanner  [address]  [place] [address]       Dear Colleague,    Thank you for the opportunity to participate in the care of your patient, Eliseo Tanner, at the [facility] [place] [facility]. Please see a copy of my visit note below.    Eliseo is a 65 year old who is being evaluated via a billable video visit.      How would you like to obtain your AVS? MyChart  If the video visit is dropped, the invitation should be resent by: Text to cell phone: [phone]  Will anyone else be joining your video visit? No      Vitals - Patient Reported  Weight (Patient Reported): 88 kg (194 lb)  Height (Patient Reported): 170.2 cm (5' 7\")  BMI (Based on Pt Reported Ht/Wt): 30.38  Pain Score: No Pain (0)(No SOB)      Vitals were taken and medications where reconciled.    SANGITA Reveles  10:00 AM      Start time: 1042  End time: 1052  Platform: [employer]  Patient location: Home    HPI:   Eliseo Tanner is a 65 year old male with NICM (LVEF 15-20%), s/p HM III 2/18/20 (post op c/b retrosternal hematoma with bleeding in the lungs), s/p ICD, h/o MSSA driveline infection and bacteremia 11/5/20 on prophylactic cephalexin, h/o VT on amiodarone, nonobstructive CAD, severe MR, atrial fibrillation on warfarin, HTN, ABHINAV requiring CPAP, CKD Stage IV, DM Type II, Hyperlipidemia, and history of HIT. He underwent hospitalization from 2/15/21-3/17/21 at [facility] for mixed cardiogenic and distributive shock with an intermittent wide complex tachycardia. Initially requiring vasopressors and inotropes, intubated for hypoxemia. He was treated for legionella pneumonia and since extubated. Transitioned to iHD with improving renal status with cession of HD 3/10. He presents to clinic for hospital follow up.     He notes mild dizziness with standing quickly as he gets out of the car. He notes bruising over his right eye, but denies headache " or vision changes. He notes mild bleeding with scant drainage to his drive line site, bleeding has since resolved. He denies changes in speech, fever, chills, chest pain, SOB, MIRANDA, PND, cough, nausea, vomiting, diarrhea, melena, hematochezia, and LE edema.  His weight remains stable at 193-194 lbs. He continues to maintain a low sodium diet. He is feeling much better over all since he returned home.     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    SOCIAL HISTORY:  Social History     Socioeconomic History     Marital status:      Spouse name: None     Number of children: None     Years of education: None     Highest education level: None   Occupational History     None   Social Needs     Financial resource strain: None     Food insecurity     Worry: None     Inability: None     Transportation needs     Medical: None     Non-medical: None   Tobacco Use     Smoking status: Former Smoker     Quit date: 1994     Years since quittin.9     Smokeless tobacco: Never Used   Substance and Sexual Activity     Alcohol use: None     Drug use: None     Sexual activity: None   Lifestyle     Physical activity     Days per week: None     Minutes per session: None     Stress: None   Relationships     Social connections     Talks on phone: None     Gets together: None     Attends [employer]ism service: None     Active member of club or organization: None     Attends meetings of clubs or organizations: None     Relationship status: None     Intimate partner violence     Fear of current or ex partner:  None     Emotionally abused: None     Physically abused: None     Forced sexual activity: None   Other Topics Concern     None   Social History Narrative     None       CURRENT MEDICATIONS:  Outpatient Medications Prior to Visit   Medication Sig Dispense Refill     allopurinol (ZYLOPRIM) 100 MG tablet 2 tablets (200 mg) by Oral or Feeding Tube route daily 60 tablet 1     amiodarone (PACERONE) 200 MG tablet Take 1 tablet (200 mg) by mouth daily 90 tablet 3     amLODIPine (NORVASC) 2.5 MG tablet Take 1 tablet (2.5 mg) by mouth daily 30 tablet 1     aspirin (ASA) 81 MG EC tablet Take 1 tablet (81 mg) by mouth daily 90 tablet 3     atorvastatin (LIPITOR) 20 MG tablet Take 1 tablet (20 mg) by mouth daily 30 tablet 1     bumetanide (BUMEX) 1 MG tablet Take 3 tablets (3 mg) by mouth daily 90 tablet 1     cephALEXin (KEFLEX) 500 MG capsule Take 1 capsule (500 mg) by mouth 4 times daily 120 capsule 1     co-enzyme Q-10 200 MG CAPS 200 mg by Oral or Feeding Tube route daily       finasteride (PROSCAR) 5 MG tablet Take 1 tablet (5 mg) by mouth daily Helps with urinary retention. 30 tablet 0     FLUoxetine (PROZAC) 10 MG capsule Take 30 mg by mouth daily       hydrALAZINE (APRESOLINE) 50 MG tablet Take 2 tablets (100 mg) by mouth 3 times daily 560 tablet 3     insulin glargine (BASAGLAR KWIKPEN) 100 UNIT/ML pen Inject 10 Units Subcutaneous At Bedtime        insulin pen needle (BD JAIME U/F) 32G X 4 MM miscellaneous        magnesium oxide (MAG-OX) 400 MG tablet 1 tablet (400 mg) by Oral or Feeding Tube route daily 30 tablet 1     multivitamin RENAL (NEPHROCAPS/TRIPHROCAPS) 1 MG capsule Take 1 capsule by mouth daily 30 capsule 1     nitroGLYcerin (NITROSTAT) 0.4 MG sublingual tablet For chest pain place 1 tablet under the tongue every 5 minutes for 3 doses. If symptoms persist 5 minutes after 1st dose call 911. 30 tablet 0     omeprazole (PRILOSEC) 20 MG DR capsule Take 20 mg by mouth daily       potassium chloride ER (KLOR-CON M)  20 MEQ CR tablet Take 2 tablets (40 mEq) by mouth 2 times daily 120 tablet 1     senna-docusate (SENOKOT-S/PERICOLACE) 8.6-50 MG tablet Take 1 tablet by mouth 2 times daily as needed for constipation       tamsulosin (FLOMAX) 0.4 MG capsule Take 1 capsule (0.4 mg) by mouth daily This med helps with urinary retention 30 capsule 0     warfarin ANTICOAGULANT (COUMADIN) 2 MG tablet Take 4 mg by mouth daily        zolpidem (AMBIEN) 5 MG tablet Take 5 mg by mouth nightly as needed for Insomnia       No facility-administered medications prior to visit.        ROS:   CONSTITUTIONAL: Denies fever, chills, fatigue, or weight fluctuations.   HEENT: Denies headache, vision changes, and changes in speech.   CV: Refer to HPI.   PULMONARY:Denies shortness of breath, cough, or previous TB exposure.   GI:Denies nausea, vomiting, diarrhea, and abdominal pain. Bowel movements are regular.   :Denies urinary alterations, dysuria, urinary frequency, hematuria, and abnormal drainage.   EXT:Denies lower extremity edema.   SKIN: Refer to HPI.   MUSCULOSKELETAL:Denies upper or lower extremity weakness and pain.   NEUROLOGIC:Denies lightheadedness, dizziness, seizures, or upper or lower extremity paresthesia.     Labs:  CBC RESULTS:  Lab Results   Component Value Date    WBC 7.9 03/19/2021    RBC 3.88 03/19/2021    HGB 10.1 (A) 03/19/2021    HCT 32.2 03/19/2021    MCV 83.0 03/19/2021    MCH 26.0 03/19/2021    MCHC 31.4 03/19/2021    RDW 22.8 03/19/2021     03/19/2021     03/17/2021       CMP RESULTS:  Lab Results   Component Value Date     03/19/2021    POTASSIUM 4.0 03/19/2021    CHLORIDE 98.0 03/19/2021    CO2 32.0 03/19/2021    ANIONGAP 8 03/19/2021     (A) 03/19/2021    BUN 59 03/19/2021    CR 2.1 03/19/2021    GFRESTIMATED 34 03/19/2021    GFRESTBLACK 39 (L) 03/17/2021    CALOS 9.1 03/19/2021    BILITOTAL 0.5 03/19/2021    ALBUMIN 4.4 03/19/2021    ALKPHOS 138.0 03/19/2021    ALT 35 03/19/2021    AST 60.0  03/19/2021        INR RESULTS:  Lab Results   Component Value Date    INR 2.5 (A) 03/19/2021    INR 2.5 (A) 03/19/2021       Lab Results   Component Value Date    MAG 1.5 (L) 03/17/2021     Lab Results   Component Value Date    NTBNPI 27,781 (H) 02/15/2021     No results found for: NTBNP    Assessment and Plan:   Eliseo Tanner is a 65 year old male with NICM (LVEF 15-20%), s/p HM III 2/18/20 (post op c/b retrosternal hematoma with bleeding in the lungs), s/p ICD, h/o MSSA driveline infection and bacteremia 11/5/20 on prophylactic cephalexin, h/o VT on amiodarone, nonobstructive CAD, severe MR, atrial fibrillation on warfarin, HTN, ABHINAV requiring CPAP, CKD Stage IV, DM Type II, Hyperlipidemia, and history of HIT. He underwent hospitalization from 2/15/21-3/17/21 at [facility] for mixed cardiogenic and distributive shock with an intermittent wide complex tachycardia. Initially requiring vasopressors and inotropes, intubated for hypoxemia. He was treated for legionella pneumonia and since extubated. Transitioned to iHD with improving renal status with cession of HD 3/10. He presents to clinic for hospital follow up and is doing well per history.      Mixed Cardiogenic and septic shock, resolved. Acute hypoxic respiratory failure secondary to Legionella PNA. Presented in acute hypoxia, febrile with CT chest consistent with bilateral infiltrates. WBC 24.5, Procal 7.95, , Lactacte 6.9. Legionella antigen positive. Completed IV antibiotics )cefazolin ended 3/1 and azithromycin ended 2/25). Component of cardiogenic shock with rise in LDH to 8531.  - Remains stable on room air.      Chronic SCHF s/p HM III LVAD. Elevated LDH in setting of infection, resolved. Implanted 2/18/20.   Stage D, NYHA Class III  ACEi/ARB Lisinopril held in setting of renal failure. Hydralazine 100 mg po TID.   BB contraindicated due to low cardiac output  Aldosterone antagonist contraindicated due to renal dysfunction  SCD prophylaxis  ICD  Fluid status euvolemic per history. Continue bumex 3 mg po daily.   MAP: unable to obtain at home. Renal appointment in AM.   LDH: 336 3/19/21, weekly trend for 1 month.   Anticoagulation: Coumadin per pharmacy. INR-2.5 3/19, Goal 2-3.  Antiplatelet: ASA 81 mg po daily      Acute on Chronic CKD Stave IV secondary to shock. Cr peaked at 7.1. Last HD run 3/10. Tunneled line removed 3/16/21.  - Appreciate Nephrology outpatient management. He has an appointment in AM.      Wide Complex Tachycardia. History of Afib s/p DCCV and aflutter. Did not respond to Adenosine on admission. Underlying atrial flutter with 3:1. S/p CHAMP and DCCV, in SR currently.   - Continue Amiodarone.      Chronic microcytic/hypochromic anemia. Coagulopathy related to sepsis, resolved. IgG Kappa monoclonal Gammopathy of undetermined significance  - Hgb-10.1 3/19/21.  - follow up with [facility] and [facility] (Dr. Ibarra) as recommended.      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 Platelets 250K --> ~ 90K with documented history of exposure to unfractionate heparin products at OSH prior to his installation at the [facility] [employer]. HIT panel tests at that time with first negative and second antibody test was positive. No known thrombosis events. DAVINA antibody negative. Per Dr. James with hematology no other cause for isolated thrombocytopenia. Immediate recovery of plts following d/c of heparin. 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   - avoiding heparin products  - continue warfarin     DM Type II, controlled.   A1C 6.8 1/6/21  - Management per PCP.     Follow up in Cardiology clinic in 1 month, consider Mar[facility] Gomez appointment with Dr. Cisneros.     JUAN Palacio CNP  3/25/2021          CC  SELF, REFERRED        Please do not hesitate to contact me if you have any questions/concerns.     Sincerely,     JUAN Palacio CNP

## 2021-04-02 DIAGNOSIS — I50.22 CHRONIC SYSTOLIC CONGESTIVE HEART FAILURE (H): Primary | ICD-10-CM

## 2021-04-05 ENCOUNTER — TELEPHONE (OUTPATIENT)
Dept: NEPHROLOGY | Facility: CLINIC | Age: 66
End: 2021-04-05

## 2021-04-05 NOTE — TELEPHONE ENCOUNTER
Call to patient to see if appointment with Maura Bauman CNP is still needed as it looks like he just had an appointment with Dr. Vila at an outside facility. Provided number for call back  Constance Beach LPN  Nephrology  [phone]

## 2021-04-05 NOTE — PROGRESS NOTES
Advised pt that Laine ALEGRIA NP has ordered a weekly LDH level starting week of April 5th.  Pt verbalized understanding.   Labs faxed to [facility] @ [phone]

## 2021-04-06 ENCOUNTER — VIRTUAL VISIT (OUTPATIENT)
Dept: NEPHROLOGY | Facility: CLINIC | Age: 66
End: 2021-04-06
Payer: MEDICARE

## 2021-04-06 VITALS — WEIGHT: 198 LBS | BODY MASS INDEX: 31.01 KG/M2

## 2021-04-06 DIAGNOSIS — N17.9 ACUTE KIDNEY INJURY (H): Primary | ICD-10-CM

## 2021-04-06 ASSESSMENT — PAIN SCALES - GENERAL: PAINLEVEL: NO PAIN (0)

## 2021-04-06 NOTE — PROGRESS NOTES
Eliseo is a 65 year old who is being evaluated via a billable telephone visit.      What phone number would you like to be contacted at? [phone]  How would you like to obtain your AVS? Mail a copy     Patient has met with his Primary Nephrologist on 3/26/21 in [facility] for post hospital follow up. He has return visit for 6 months. His creat has returned to baseline.   No followup with us is required  No charge for this visit  Elvie Vance CNP    Phone call duration: 5 minutes

## 2021-04-06 NOTE — LETTER
4/6/2021       RE: Eliseo Tanner  [address] [address]     Dear Colleague,    Thank you for referring your patient, Eliseo Tanner, to the [facility] [place] at [facility]. Please see a copy of my visit note below.    Eliseo is a 65 year old who is being evaluated via a billable telephone visit.      What phone number would you like to be contacted at? [phone]  How would you like to obtain your AVS? Mail a copy     Patient has met with his Primary Nephrologist on 3/26/21 in [facility] for post hospital follow up. He has return visit for 6 months. His creat has returned to baseline.   No followup with us is required  No charge for this visit  Elvie Vance CNP    Phone call duration: 5 minutes

## 2021-04-07 ENCOUNTER — TELEPHONE (OUTPATIENT)
Dept: ANTICOAGULATION | Facility: CLINIC | Age: 66
End: 2021-04-07

## 2021-04-07 DIAGNOSIS — I50.22 CHRONIC SYSTOLIC CONGESTIVE HEART FAILURE (H): ICD-10-CM

## 2021-04-07 DIAGNOSIS — Z95.811 LVAD (LEFT VENTRICULAR ASSIST DEVICE) PRESENT (H): ICD-10-CM

## 2021-04-07 DIAGNOSIS — I48.0 PAROXYSMAL ATRIAL FIBRILLATION (H): ICD-10-CM

## 2021-04-07 NOTE — TELEPHONE ENCOUNTER
ANTICOAGULATION     Eliseo Emmanuel Ciro is overdue for INR check.      Patient reports he will go in for an INR check on 4/9.  Patient reports he has labs Q Friday X 4 weeks.    Gloria Abbasi RN

## 2021-04-08 ENCOUNTER — VIRTUAL VISIT (OUTPATIENT)
Dept: INFECTIOUS DISEASES | Facility: CLINIC | Age: 66
End: 2021-04-08
Attending: INTERNAL MEDICINE
Payer: MEDICARE

## 2021-04-08 DIAGNOSIS — T82.7XXA INFECTION ASSOCIATED WITH DRIVELINE OF LEFT VENTRICULAR ASSIST DEVICE (LVAD) (H): ICD-10-CM

## 2021-04-08 PROCEDURE — 99443 PR PHYSICIAN TELEPHONE EVALUATION 21-30 MIN: CPT | Mod: 95 | Performed by: INTERNAL MEDICINE

## 2021-04-08 RX ORDER — CEPHALEXIN 500 MG/1
CAPSULE ORAL
Qty: 120 CAPSULE | Refills: 1 | Status: SHIPPED | OUTPATIENT
Start: 2021-04-08 | End: 2021-06-05

## 2021-04-08 SDOH — HEALTH STABILITY: MENTAL HEALTH: HOW MANY STANDARD DRINKS CONTAINING ALCOHOL DO YOU HAVE ON A TYPICAL DAY?: NOT ASKED

## 2021-04-08 SDOH — HEALTH STABILITY: MENTAL HEALTH: HOW OFTEN DO YOU HAVE A DRINK CONTAINING ALCOHOL?: NOT ASKED

## 2021-04-08 SDOH — HEALTH STABILITY: MENTAL HEALTH: HOW OFTEN DO YOU HAVE 6 OR MORE DRINKS ON ONE OCCASION?: NOT ASKED

## 2021-04-08 NOTE — PROGRESS NOTES
Eliseo is a 65 year old who is being evaluated via a billable telephone visit.      What phone number would you like to be contacted at? [phone]  How would you like to obtain your AVS? Mail a copy  Phone call duration: 23 minutes  Gianna GAY CMA    [facility]  Infectious Disease Outpatient Follow up Note     Patient:  Eliseo Tanner, Date of birth 1955, Medical record number [id number]  Date of Visit:  04/08/2021       Problem List:  1. Chronic MSSA LVAD infection, on cephalexin suppression  2. Severe Legionella pneumonia serogroup 1: Urine antigen positive, sputum PCR positive plus pulmonary opacities.  S/p 10 days of azithromycin (completed 2/25)  3. CKD (crcl ~43)  4. 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 History of NICM s/p HM III (2/18/20), ICD placement (3/16/20)  6. Received 1st dose of the Moderna covid 19 vaccine on 2/11, no plans to get 2nd vaccine    Assessment:  64 y/o M w/ NICM s/p HM III and ICD placement, chronic MSSA driveline infection c/b MSSA bacteremia 11/2020 on cephalexin suppression who was recently hospitalized from 2/15-3/17 for severe Legionella pneumonia s/p 10 days of azithromycin.  Hospitalization was complicated by cardiogenic shock, oliguric renal failure requiring CRRT and respiratory decompensation requiring intubation. 2/15 CT C/A/P revealed that there was trace fluid around the LVAD with some mild surrounding inflammation within the subq tissue fat and superior right rectus abdominal muscle.  In attempt to reduce the bacterial burden cephalexin was changed to IV cefazolin and he received cefazolin for about 2 weeks.  After this he was transitioned to 4 times a day cephalexin.  Overall the drainage has improved from the driveline site and there is lack of erythema around the LVAD exit site.  Currently he is on treatment doses of cephalexin.  We will continue treatment doses for another 2 weeks and  slowly taper to prophylactic dosing.  His wife is very engaged with doing his dressing changes so she will notify me if there are any changes in the characteristics of the LVAD site or drainage.  Will arrange follow-up in 3 months.    Recommendations:  1. Continue cephalexin 500 mg 4 times/day x2 more weeks followed by 500 mg 3 times/day x 2 weeks followed by 500 mg twice a day.  Currently he is on treatment doses of cephalexin so we will taper down slowly to prophylactic doses.  The wife will call if there are issues during the tapering process.   2. Plan for follow-up in 3 months      40 minutes spent on the date of the encounter doing chart review, review of outside records, review of test results, interpretation of tests, patient visit and documentation     Negar Bhatti DO.   Infectious Diseases  Pager [phone]        Interval History:   Eliseo was last seen by infectious diseases during his last hospitalization (2/15-3/17).  He was last seen on 2/23/21.  He finished treatment for severe Legionella pneumonia.  He also received cefazolin from 2/15-3/1 for acute on chronic MSSA driveline infection.  He is currently on cephalexin 500 mg 4 times a day.  The wife is the primary person who takes care of his LVAD dressing changes.  She thinks that the driveline site looks much better than prior to his hospitalization.  The surrounding area is without erythema.  There is minimal drainage present that can be yellowish in color but overall there is scant drainage.  They have not had any issues with the VAD line positioning.  Denies subjective fevers, chills, nausea, vomiting, diarrhea.  He is tolerating the antibiotic without issue.  Most recent creatinine on 3/26 was 1.83.  White blood cell count is normal.     Review of Systems:Remaining systems all reviewed and negative.           Medications & Allergies:     Current Outpatient Medications   Medication     allopurinol (ZYLOPRIM) 100 MG tablet     amiodarone  (PACERONE) 200 MG tablet     amLODIPine (NORVASC) 2.5 MG tablet     aspirin (ASA) 81 MG EC tablet     atorvastatin (LIPITOR) 20 MG tablet     bumetanide (BUMEX) 1 MG tablet     cephALEXin (KEFLEX) 500 MG capsule     co-enzyme Q-10 200 MG CAPS     finasteride (PROSCAR) 5 MG tablet     FLUoxetine (PROZAC) 10 MG capsule     hydrALAZINE (APRESOLINE) 50 MG tablet     insulin glargine (BASAGLAR KWIKPEN) 100 UNIT/ML pen     insulin pen needle (BD JAIME U/F) 32G X 4 MM miscellaneous     magnesium oxide (MAG-OX) 400 MG tablet     multivitamin RENAL (NEPHROCAPS/TRIPHROCAPS) 1 MG capsule     nitroGLYcerin (NITROSTAT) 0.4 MG sublingual tablet     omeprazole (PRILOSEC) 20 MG DR capsule     potassium chloride ER (KLOR-CON M) 20 MEQ CR tablet     senna-docusate (SENOKOT-S/PERICOLACE) 8.6-50 MG tablet     tamsulosin (FLOMAX) 0.4 MG capsule     warfarin ANTICOAGULANT (COUMADIN) 2 MG tablet     zolpidem (AMBIEN) 5 MG tablet     No current facility-administered medications for this visit.          Allergies   Allergen Reactions     Heparin      HIT screen positive 2/14/20, reflex DAVINA negative; however heme recommended treating as if positive  HIT screen negative 2/11/20     Oxycodone Itching and Other (See Comments)     Chlorhexidine Rash            Physical Exam:       Telephone visit:  GENERAL: alert and no distress  RESP: No audible wheeze, cough, or visible cyanosis.    NEURO:  Mentation and speech appropriate for age.  PSYCH:normal speech          Laboratory Data:   Metabolic Studies       Recent Labs   Lab Test 03/19/21 03/17/21  0613 02/24/21  0442 02/24/21  0442 02/23/21  2130 02/22/21  1539 02/22/21  1539 02/15/21  1910 02/15/21  1910 01/06/21  0539 01/06/21  0539 02/13/20  0206 02/13/20  0206    137   < > 132*  --    < >  --    < > 129*   < > 135   < > 132*   POTASSIUM 4.0 3.9   < > 4.1  --    < >  --    < > 5.1   < > 4.3   < > 4.1  4.2   CHLORIDE 98.0 102   < > 99  --    < >  --    < > 97   < > 102   < > 96   CO2 32.0  29   < > 25  --    < >  --    < > 16*   < > 23   < > 22   ANIONGAP 8 6   < > 8  --    < >  --    < > 17*   < > 10   < > 14   BUN 59 69*   < > 44*  --    < >  --    < > 78*   < > 105*   < > 34*   CR 2.1 2.00*   < > 3.84*  --    < >  --    < > 3.23*   < > 2.85*   < > 1.58*   GFRESTIMATED 34 33*   < > 15*  --    < >  --    < > 19*   < > 22*   < > 45*   * 119*   < > 125*  --    < >  --    < > 215*   < > 145*   < > 154*   A1C  --   --   --   --   --   --   --   --   --   --  6.8*  --   --    CALOS 9.1 8.3*   < > 8.0*  --    < >  --    < > 8.7   < > 8.7   < > 8.9   PHOS  --   --   --   --   --   --  5.2*   < > 5.0*  --   --    < > 5.4*   MAG  --  1.5*   < >  --   --   --  2.6*   < > 2.1   < > 2.3   < > 2.0   LACT  --   --   --  0.7 1.4   < > 0.9   < > 6.9*  --   --    < > 9.5*   PCAL  --   --   --   --   --   --   --   --  7.95*  --   --   --   --    CKT  --   --   --   --   --   --   --   --  884*  --   --   --  39    < > = values in this interval not displayed.       Hepatic Studies    Recent Labs   Lab Test 03/19/21 03/15/21  0551 03/14/21  1720 11/14/20 2041 11/14/20 2041   BILITOTAL 0.5 0.4 0.4   < > 0.7   DBIL  --   --   --   --  0.4*   ALKPHOS 138.0 121 128   < > 184*   PROTTOTAL 8.8 7.7 8.2   < > 7.8   ALBUMIN 4.4 2.9* 3.3*   < > 2.6*   AST 60.0 39 42   < > 48*   ALT 35 29 32   < > 56    283*  --    < >  --     < > = values in this interval not displayed.       Hematology Studies      Recent Labs   Lab Test 03/19/21 03/17/21  0613 03/16/21  0551 03/15/21  0551 03/14/21  0459 03/13/21  0440 03/07/21  0543 03/07/21  0543 03/06/21  0430   WBC 7.9 6.6 6.5 6.3 5.9 5.8   < > 4.5 4.4   ANEU  --   --   --   --   --   --   --  2.6 2.8   ALYM  --   --   --   --   --   --   --  0.9 0.7*   GIULIANO  --   --   --   --   --   --   --  0.7 0.6   AEOS  --   --   --   --   --   --   --  0.3 0.2   HGB 10.1* 9.0* 8.9* 8.7* 8.5* 8.4*   < > 7.9* 8.0*   HCT 32.2 29.0* 29.0* 28.8* 28.5* 27.4*   < > 25.9* 25.3*    268  266 275 259 257   < > 208 196    < > = values in this interval not displayed.       Inflammatory Markers    Recent Labs   Lab Test 02/16/21  2219 02/15/21  1910 02/11/21  0838 12/17/20  0756 12/14/20  0815 11/05/20   SED 72* 47*  --   --   --   --    .0* 270.0* 8.6 8.0 6.1 54.9     Microbiology:  Last Culture results with specimen source  Culture Micro   Date Value Ref Range Status   02/17/2021 No growth  Final   02/17/2021 No growth  Final   02/16/2021 Light growth  Enterococcus faecium   (A)  Final   02/16/2021 (A)  Final    Legionella pneumophila  isolated  Sent to [facility] for serotyping     02/16/2021   Final    Critical Value/Significant Value, preliminary result only, called to and read back by  Shobha Pedro RN on 2.23.21 at 1401. bw     02/16/2021 (A)  Final    Report received from the [place] Dept. of Health:  Legionella pneumophila serogroup 1  This test was developed and its performance characteristics determined by the [facility].  It has not been cleared or approved by the U.S. Food and Drug   Administration:21CFR 809.30(e).  The FDA has determined that such clearance is not   necessary.     02/15/2021 Moderate growth  Staphylococcus aureus   (A)  Final   02/15/2021 Moderate growth  Strain 2  Staphylococcus aureus   (A)  Final   02/15/2021 Moderate growth  Strain 3  Staphylococcus aureus   (A)  Final   02/15/2021 Light growth  Normal skin freeman    Final   02/15/2021 (A)  Final    Canceled, Test credited  >10 Squamous epithelial cells/low power field indicates oral contamination. Please   recollect.  Notification of test cancellation was given to  Bethanie Murillo RN on 2.15.21 at 2326. JRT     02/15/2021 No growth  Final   02/15/2021 No growth  Final   02/08/2021 No growth  Final   02/08/2021 No growth  Final   02/08/2021 Moderate growth  Staphylococcus aureus   (A)  Final   02/08/2021 Moderate growth  Strain 2  Staphylococcus aureus   (A)  Final   02/08/2021 No anaerobes isolated   Final    Specimen Description   Date Value Ref Range Status   02/17/2021 Blood Right Hand  Final   02/17/2021 Blood Left Arm  Final   02/16/2021 Nares  Final   02/16/2021 Sputum Endotracheal  Final   02/16/2021 Sputum Endotracheal  Final   02/16/2021 Urine  Final   02/16/2021 Urine  Final   02/15/2021 Other LINE  Final   02/15/2021 Other LINE  Final   02/15/2021 Sputum  Final   02/15/2021 Sputum  Final   02/15/2021 Blood Right Hand  Final   02/15/2021 Blood Right Hand  Final   02/08/2021 Blood Right Hand  Final   02/08/2021 Blood Left Hand  Final   02/08/2021 Other DRLINE EXIT SITE  Final   02/08/2021 Other DRLINE EXIT SITE  Final   02/08/2021 Other DRLINE EXIT SITE  Final        Last check of C difficile  C Diff Toxin B PCR   Date Value Ref Range Status   11/15/2020 Negative NEG^Negative Final     Comment:     Negative: C. difficile target DNA sequences NOT detected, presumed negative   for C.difficile toxin B or the number of bacteria present may be below the   limit of detection for the test.  FDA approved assay performed using [employer] GeneXpert real-time PCR.  A negative result does not exclude actual disease due to C. difficile and may   be due to improper collection, handling and storage of the specimen or the   number of organisms in the specimen is below the detection limit of the assay.         Urine Studies     Recent Labs   Lab Test 02/16/21  0225 11/17/20  0825 02/22/20  0944 02/13/20  0334 02/07/20 2029   URINEPH 5.5 5.5 5.5 6.0 5.0   NITRITE Negative Negative Negative Negative Negative   LEUKEST Negative Negative Small* Small* Negative   WBCU 0 0 2 81* 3     Imaging:  No results found for this or any previous visit (from the past 48 hour(s)).

## 2021-04-08 NOTE — LETTER
4/8/2021       RE: Eilseo Tanner  [address]  [place] [address]     Dear Colleague,    Thank you for referring your patient, Eliseo Tanner, to the [facility] INFECTIOUS DISE[facility] [place] at [facility] [place]. Please see a copy of my visit note below.    Eliseo is a 65 year old who is being evaluated via a billable telephone visit.      What phone number would you like to be contacted at? [phone]  How would you like to obtain your AVS? Mail a copy  Phone call duration: 23 minutes  Gianna GAY [facility]  Infectious Disease Outpatient Follow up Note     Patient:  Eliseo Tanner, Date of birth 1955, Medical record number [id number]  Date of Visit:  04/08/2021       Problem List:  1. Chronic MSSA LVAD infection, on cephalexin suppression  2. Severe Legionella pneumonia serogroup 1: Urine antigen positive, sputum PCR positive plus pulmonary opacities.  S/p 10 days of azithromycin (completed 2/25)  3. CKD (crcl ~43)  4. 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 History of NICM s/p HM III (2/18/20), ICD placement (3/16/20)  6. Received 1st dose of the Moderna covid 19 vaccine on 2/11, no plans to get 2nd vaccine    Assessment:  64 y/o M w/ NICM s/p HM III and ICD placement, chronic MSSA driveline infection c/b MSSA bacteremia 11/2020 on cephalexin suppression who was recently hospitalized from 2/15-3/17 for severe Legionella pneumonia s/p 10 days of azithromycin.  Hospitalization was complicated by cardiogenic shock, oliguric renal failure requiring CRRT and respiratory decompensation requiring intubation. 2/15 CT C/A/P revealed that there was trace fluid around the LVAD with some mild surrounding inflammation within the subq tissue fat and superior right rectus abdominal muscle.  In attempt to reduce the bacterial burden cephalexin was changed to IV cefazolin and he  received cefazolin for about 2 weeks.  After this he was transitioned to 4 times a day cephalexin.  Overall the drainage has improved from the driveline site and there is lack of erythema around the LVAD exit site.  Currently he is on treatment doses of cephalexin.  We will continue treatment doses for another 2 weeks and slowly taper to prophylactic dosing.  His wife is very engaged with doing his dressing changes so she will notify me if there are any changes in the characteristics of the LVAD site or drainage.  Will arrange follow-up in 3 months.    Recommendations:  1. Continue cephalexin 500 mg 4 times/day x2 more weeks followed by 500 mg 3 times/day x 2 weeks followed by 500 mg twice a day.  Currently he is on treatment doses of cephalexin so we will taper down slowly to prophylactic doses.  The wife will call if there are issues during the tapering process.   2. Plan for follow-up in 3 months      40 minutes spent on the date of the encounter doing chart review, review of outside records, review of test results, interpretation of tests, patient visit and documentation     Negar Bhatti DO.   Infectious Diseases  Pager [phone]        Interval History:   Eliseo was last seen by infectious diseases during his last hospitalization (2/15-3/17).  He was last seen on 2/23/21.  He finished treatment for severe Legionella pneumonia.  He also received cefazolin from 2/15-3/1 for acute on chronic MSSA driveline infection.  He is currently on cephalexin 500 mg 4 times a day.  The wife is the primary person who takes care of his LVAD dressing changes.  She thinks that the driveline site looks much better than prior to his hospitalization.  The surrounding area is without erythema.  There is minimal drainage present that can be yellowish in color but overall there is scant drainage.  They have not had any issues with the VAD line positioning.  Denies subjective fevers, chills, nausea, vomiting, diarrhea.  He is  tolerating the antibiotic without issue.  Most recent creatinine on 3/26 was 1.83.  White blood cell count is normal.     Review of Systems:Remaining systems all reviewed and negative.           Medications & Allergies:     Current Outpatient Medications   Medication     allopurinol (ZYLOPRIM) 100 MG tablet     amiodarone (PACERONE) 200 MG tablet     amLODIPine (NORVASC) 2.5 MG tablet     aspirin (ASA) 81 MG EC tablet     atorvastatin (LIPITOR) 20 MG tablet     bumetanide (BUMEX) 1 MG tablet     cephALEXin (KEFLEX) 500 MG capsule     co-enzyme Q-10 200 MG CAPS     finasteride (PROSCAR) 5 MG tablet     FLUoxetine (PROZAC) 10 MG capsule     hydrALAZINE (APRESOLINE) 50 MG tablet     insulin glargine (BASAGLAR KWIKPEN) 100 UNIT/ML pen     insulin pen needle (BD JAIME U/F) 32G X 4 MM miscellaneous     magnesium oxide (MAG-OX) 400 MG tablet     multivitamin RENAL (NEPHROCAPS/TRIPHROCAPS) 1 MG capsule     nitroGLYcerin (NITROSTAT) 0.4 MG sublingual tablet     omeprazole (PRILOSEC) 20 MG DR capsule     potassium chloride ER (KLOR-CON M) 20 MEQ CR tablet     senna-docusate (SENOKOT-S/PERICOLACE) 8.6-50 MG tablet     tamsulosin (FLOMAX) 0.4 MG capsule     warfarin ANTICOAGULANT (COUMADIN) 2 MG tablet     zolpidem (AMBIEN) 5 MG tablet     No current facility-administered medications for this visit.          Allergies   Allergen Reactions     Heparin      HIT screen positive 2/14/20, reflex DAVINA negative; however heme recommended treating as if positive  HIT screen negative 2/11/20     Oxycodone Itching and Other (See Comments)     Chlorhexidine Rash            Physical Exam:       Telephone visit:  GENERAL: alert and no distress  RESP: No audible wheeze, cough, or visible cyanosis.    NEURO:  Mentation and speech appropriate for age.  PSYCH:normal speech          Laboratory Data:   Metabolic Studies       Recent Labs   Lab Test 03/19/21 03/17/21  0613 02/24/21  0442 02/24/21  0442 02/23/21  2130 02/22/21  1539 02/22/21  1532  02/15/21  1910 02/15/21  1910 01/06/21  0539 01/06/21  0539 02/13/20 0206 02/13/20 0206    137   < > 132*  --    < >  --    < > 129*   < > 135   < > 132*   POTASSIUM 4.0 3.9   < > 4.1  --    < >  --    < > 5.1   < > 4.3   < > 4.1  4.2   CHLORIDE 98.0 102   < > 99  --    < >  --    < > 97   < > 102   < > 96   CO2 32.0 29   < > 25  --    < >  --    < > 16*   < > 23   < > 22   ANIONGAP 8 6   < > 8  --    < >  --    < > 17*   < > 10   < > 14   BUN 59 69*   < > 44*  --    < >  --    < > 78*   < > 105*   < > 34*   CR 2.1 2.00*   < > 3.84*  --    < >  --    < > 3.23*   < > 2.85*   < > 1.58*   GFRESTIMATED 34 33*   < > 15*  --    < >  --    < > 19*   < > 22*   < > 45*   * 119*   < > 125*  --    < >  --    < > 215*   < > 145*   < > 154*   A1C  --   --   --   --   --   --   --   --   --   --  6.8*  --   --    CALOS 9.1 8.3*   < > 8.0*  --    < >  --    < > 8.7   < > 8.7   < > 8.9   PHOS  --   --   --   --   --   --  5.2*   < > 5.0*  --   --    < > 5.4*   MAG  --  1.5*   < >  --   --   --  2.6*   < > 2.1   < > 2.3   < > 2.0   LACT  --   --   --  0.7 1.4   < > 0.9   < > 6.9*  --   --    < > 9.5*   PCAL  --   --   --   --   --   --   --   --  7.95*  --   --   --   --    CKT  --   --   --   --   --   --   --   --  884*  --   --   --  39    < > = values in this interval not displayed.       Hepatic Studies    Recent Labs   Lab Test 03/19/21 03/15/21  0551 03/14/21  1720 11/14/20  2041 11/14/20 2041   BILITOTAL 0.5 0.4 0.4   < > 0.7   DBIL  --   --   --   --  0.4*   ALKPHOS 138.0 121 128   < > 184*   PROTTOTAL 8.8 7.7 8.2   < > 7.8   ALBUMIN 4.4 2.9* 3.3*   < > 2.6*   AST 60.0 39 42   < > 48*   ALT 35 29 32   < > 56    283*  --    < >  --     < > = values in this interval not displayed.       Hematology Studies      Recent Labs   Lab Test 03/19/21 03/17/21  0613 03/16/21  0551 03/15/21  0551 03/14/21  0459 03/13/21  0440 03/07/21  0543 03/07/21  0543 03/06/21  0430   WBC 7.9 6.6 6.5 6.3 5.9 5.8   < > 4.5 4.4    ANEU  --   --   --   --   --   --   --  2.6 2.8   ALYM  --   --   --   --   --   --   --  0.9 0.7*   GIULIANO  --   --   --   --   --   --   --  0.7 0.6   AEOS  --   --   --   --   --   --   --  0.3 0.2   HGB 10.1* 9.0* 8.9* 8.7* 8.5* 8.4*   < > 7.9* 8.0*   HCT 32.2 29.0* 29.0* 28.8* 28.5* 27.4*   < > 25.9* 25.3*    268 266 275 259 257   < > 208 196    < > = values in this interval not displayed.       Inflammatory Markers    Recent Labs   Lab Test 02/16/21  2219 02/15/21  1910 02/11/21  0838 12/17/20  0756 12/14/20  0815 11/05/20   SED 72* 47*  --   --   --   --    .0* 270.0* 8.6 8.0 6.1 54.9     Microbiology:  Last Culture results with specimen source  Culture Micro   Date Value Ref Range Status   02/17/2021 No growth  Final   02/17/2021 No growth  Final   02/16/2021 Light growth  Enterococcus faecium   (A)  Final   02/16/2021 (A)  Final    Legionella pneumophila  isolated  Sent to [facility] for serotyping     02/16/2021   Final    Critical Value/Significant Value, preliminary result only, called to and read back by  Shobha Pedro RN on 2.23.21 at 1401. bw     02/16/2021 (A)  Final    Report received from the [place] Dept[facility] of Health:  Legionella pneumophila serogroup 1  This test was developed and its performance characteristics determined by the [facility].  It has not been cleared or approved by the U.S. Food and Drug   Administration:21CFR 809.30(e).  The FDA has determined that such clearance is not   necessary.     02/15/2021 Moderate growth  Staphylococcus aureus   (A)  Final   02/15/2021 Moderate growth  Strain 2  Staphylococcus aureus   (A)  Final   02/15/2021 Moderate growth  Strain 3  Staphylococcus aureus   (A)  Final   02/15/2021 Light growth  Normal skin freeman    Final   02/15/2021 (A)  Final    Canceled, Test credited  >10 Squamous epithelial cells/low power field indicates oral contamination. Please   recollect.  Notification of test cancellation was given to  Bethanie Murillo  RN on 2.15.21 at 2326. JRT     02/15/2021 No growth  Final   02/15/2021 No growth  Final   02/08/2021 No growth  Final   02/08/2021 No growth  Final   02/08/2021 Moderate growth  Staphylococcus aureus   (A)  Final   02/08/2021 Moderate growth  Strain 2  Staphylococcus aureus   (A)  Final   02/08/2021 No anaerobes isolated  Final    Specimen Description   Date Value Ref Range Status   02/17/2021 Blood Right Hand  Final   02/17/2021 Blood Left Arm  Final   02/16/2021 Nares  Final   02/16/2021 Sputum Endotracheal  Final   02/16/2021 Sputum Endotracheal  Final   02/16/2021 Urine  Final   02/16/2021 Urine  Final   02/15/2021 Other LINE  Final   02/15/2021 Other LINE  Final   02/15/2021 Sputum  Final   02/15/2021 Sputum  Final   02/15/2021 Blood Right Hand  Final   02/15/2021 Blood Right Hand  Final   02/08/2021 Blood Right Hand  Final   02/08/2021 Blood Left Hand  Final   02/08/2021 Other DRLINE EXIT SITE  Final   02/08/2021 Other DRLINE EXIT SITE  Final   02/08/2021 Other DRLINE EXIT SITE  Final        Last check of C difficile  C Diff Toxin B PCR   Date Value Ref Range Status   11/15/2020 Negative NEG^Negative Final     Comment:     Negative: C. difficile target DNA sequences NOT detected, presumed negative   for C.difficile toxin B or the number of bacteria present may be below the   limit of detection for the test.  FDA approved assay performed using [employer] GeneXpert real-time PCR.  A negative result does not exclude actual disease due to C. difficile and may   be due to improper collection, handling and storage of the specimen or the   number of organisms in the specimen is below the detection limit of the assay.         Urine Studies     Recent Labs   Lab Test 02/16/21  0225 11/17/20  0825 02/22/20  0944 02/13/20  0334 02/07/20 2029   URINEPH 5.5 5.5 5.5 6.0 5.0   NITRITE Negative Negative Negative Negative Negative   LEUKEST Negative Negative Small* Small* Negative   WBCU 0 0 2 81* 3     Imaging:  No results  found for this or any previous visit (from the past 48 hour(s)).

## 2021-04-08 NOTE — PATIENT INSTRUCTIONS
Continue cephalexin 500 mg 4 times/day x2 more weeks followed by 500 mg 3 times/day x 2 weeks followed by 500 mg twice a day. I changed to prescription to reflect this. Please call if there are changes in the LVAD drainage or site appearance during the taper.

## 2021-04-09 LAB
ANION GAP SERPL CALCULATED.3IONS-SCNC: 8 MMOL/L
BUN SERPL-MCNC: 35 MG/DL
CALCIUM SERPL-MCNC: 9 MG/DL
CHLORIDE SERPLBLD-SCNC: 103 MMOL/L
CO2 SERPL-SCNC: 28 MMOL/L
CREAT SERPL-MCNC: 1.6 MG/DL
GFR SERPL CREATININE-BSD FRML MDRD: ABNORMAL ML/MIN/{1.73_M2}
GLUCOSE SERPL-MCNC: 121 MG/DL (ref 70–99)
LDH SERPL-CCNC: 321 U/L (ref 120–246)
POTASSIUM SERPL-SCNC: 4.2 MMOL/L
SODIUM SERPL-SCNC: 139 MMOL/L

## 2021-04-12 ENCOUNTER — CARE COORDINATION (OUTPATIENT)
Dept: CARDIOLOGY | Facility: CLINIC | Age: 66
End: 2021-04-12

## 2021-04-13 NOTE — PROGRESS NOTES
April 14, 2021    Eliseo Tanner is a 65 year old male with chronic systolic heart failure secondary to NICM now s/p HM 3 on 2/18, moderate CAD, HTN, ABHINAV on CPAP, DM2, CKD Stage III, ANA. His HM3 post-op course was complicated by retrosternal hematoma and bleeding in the lungs, RV failure,VT in ICU now on amiodarone and Afib w/AVR S/p DCCV on 2/28. He had pre-op proteus and enterococcus bacteremia from 2/13, s/p abx. He had an ICD placed on 3/16/2020 just before his discharge from the hospital. He felt better after his LVAD implantation. On 11/14/20, patient was directly admitted by ID from clinic after BCx positive for MSSA.  Of note, patient did have a few days of diarrhea which had improved on day of admission and resolved since 11/15/20 (C.diff negative). Abdominal imaging (US and CT abd/pelvis) revealed no intraabdominal fluid collection concerning for abscess/infectious process. TTE did not comment on vegetation. CHAMP also did not show any signs of endocarditis.  Last positive BCx on 11/14/20 showed MSSA, and abx was stopped on 12/27/20. He then had a hospitalization 2/15/21-3/17/21 at [facility] for mixed cardiogenic and distributive shock with an intermittent wide complex tachycardia. Initially requiring high doses of vasopressors and inotropes and was intubated for hypoxemia. LDH severely elevated yet thought to be related to legionella rather than actual pump thrombus. Also had acute liver injury with LFTs in the several thousands. He also required CRRT for acute renal failure. He was treated for legionella pneumonia and ultimately recovered relatively well and was discharged in stable condition not requiring dialysis (cession of HD 3/10). He presents to clinic for follow up. Weight had been prior at around 194 lbs.  Since his discharge he has been doing well with no new issues.  He overall feels that he is recuperating well.  There were no LVAD alarms is no bleeding or other issues.  No fevers chills and  blood work has been constantly improving.  Creatinine has been also improving.  He is being seen on the antibiotics and he is following with our ID team with Dr. Bhatti and pinning with antibiotics slowly.  Otherwise no new issues.      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  SOCIAL HISTORY:  Social History     Socioeconomic History     Marital status:      Spouse name: Not on file     Number of children: Not on file     Years of education: Not on file     Highest education level: Not on file   Occupational History     Not on file   Social Needs     Financial resource strain: Not on file     Food insecurity     Worry: Not on file     Inability: Not on file     Transportation needs     Medical: Not on file     Non-medical: Not on file   Tobacco Use     Smoking status: Former Smoker     Quit date: 1994     Years since quittin.0     Smokeless tobacco: Never Used   Substance and Sexual Activity     Alcohol use: Not Currently     Drug use: Not Currently     Sexual activity: Not on file   Lifestyle     Physical activity     Days per week: Not on file     Minutes per session: Not on file     Stress: Not on file   Relationships     Social connections     Talks on phone: Not on file     Gets together: Not on file     Attends [employer] service: Not on file     Active member of club or organization: Not on file     Attends meetings of clubs or organizations: Not on file     Relationship status: Not on file     Intimate partner violence     Fear of current or ex partner: Not on file     Emotionally abused: Not on file     Physically abused: Not on file     Forced sexual activity: Not  on file   Other Topics Concern     Not on file   Social History Narrative     Not on file     CURRENT MEDICATIONS:  Current Outpatient Medications   Medication     allopurinol (ZYLOPRIM) 100 MG tablet     amiodarone (PACERONE) 200 MG tablet     amLODIPine (NORVASC) 2.5 MG tablet     aspirin (ASA) 81 MG EC tablet     atorvastatin (LIPITOR) 20 MG tablet     bumetanide (BUMEX) 1 MG tablet     cephALEXin (KEFLEX) 500 MG capsule     co-enzyme Q-10 200 MG CAPS     finasteride (PROSCAR) 5 MG tablet     FLUoxetine (PROZAC) 10 MG capsule     hydrALAZINE (APRESOLINE) 50 MG tablet     insulin glargine (BASAGLAR KWIKPEN) 100 UNIT/ML pen     insulin pen needle (BD JAIME U/F) 32G X 4 MM miscellaneous     magnesium oxide (MAG-OX) 400 MG tablet     multivitamin RENAL (NEPHROCAPS/TRIPHROCAPS) 1 MG capsule     nitroGLYcerin (NITROSTAT) 0.4 MG sublingual tablet     omeprazole (PRILOSEC) 20 MG DR capsule     potassium chloride ER (KLOR-CON M) 20 MEQ CR tablet     senna-docusate (SENOKOT-S/PERICOLACE) 8.6-50 MG tablet     tamsulosin (FLOMAX) 0.4 MG capsule     warfarin ANTICOAGULANT (COUMADIN) 2 MG tablet     zolpidem (AMBIEN) 5 MG tablet     No current facility-administered medications for this visit.      ROS:   Constitutional: No fever, chills, or sweats.  ENT: No visual disturbance, ear ache, epistaxis, sore throat.   Allergies/Immunologic: Negative.   Respiratory: No cough, hemoptysis.   Cardiovascular: As per HPI.   GI: No nausea, vomiting, hematemesis, melena, or hematochezia.   : No urinary frequency, dysuria, or hematuria.   Integument: Negative.   Psychiatric: Pleasant, no major depression noted  Neuro: No focal neurological deficits noted  Endocrinology: Negative.   Musculoskeletal: As per HPI.      EXAM:  Heart rate is 97 bpm weight is 200 pounds and map is 98 mmHg.  General: appears comfortable, alert and oriented  Head: normocephalic, atraumatic  Eyes: anicteric sclera, EOMI , PERRL  Neck: no adenopathy  Orophyarynx:  moist mucosa, no lesions noted  Heart: regular, LVAD hum appreciated estimated JVP at 5 cmH2O  Lungs: CTAB, No wheezing.   Abdomen: soft, non-tender, bowel sounds present, no hepatosplenomegaly  Extremities: No LE edema today  Skin: no open lesions noted, driveline exit site covered and appears intact  Neuro: grossly non-focal     Labs:  Lab Results   Component Value Date    WBC 7.9 03/19/2021    HGB 10.1 (A) 03/19/2021    HCT 32.2 03/19/2021     03/19/2021     04/09/2021    POTASSIUM 4.2 04/09/2021    CHLORIDE 103 04/09/2021    CO2 28 04/09/2021    BUN 35 04/09/2021    CR 1.6 04/09/2021     (A) 04/09/2021    SED 72 (H) 02/16/2021    DD 4,940 (A) 03/19/2021    NTBNPI 27,781 (H) 02/15/2021    TROPI 0.377 (HH) 02/15/2021    AST 60.0 03/19/2021    ALT 35 03/19/2021    ALKPHOS 138.0 03/19/2021    BILITOTAL 0.5 03/19/2021    INR 2.5 (A) 03/19/2021    INR 2.5 (A) 03/19/2021     All imaging studies have been reviewed in person.    Assessment and Plan:   Eliseo Tanner is a 65 year old male with NICM (LVEF 15-20%), s/p HM III 2/18/20 (post op c/b retrosternal hematoma with bleeding in the lungs), s/p ICD, h/o MSSA driveline infection and bacteremia 11/5/20 on prophylactic cephalexin, h/o VT on amiodarone, nonobstructive CAD, severe MR, atrial fibrillation on warfarin, HTN, ABHINAV requiring CPAP, CKD Stage IV, DM Type II, Hyperlipidemia, and history of HIT. He underwent hospitalization from 2/15/21-3/17/21 at [facility] for mixed cardiogenic and distributive shock with an intermittent wide complex tachycardia. Initially requiring vasopressors and inotropes, intubated for hypoxemia, severe liver and acute renal failure. He was treated for legionella pneumonia and since extubated. Transitioned to iHD with improving renal status with cession of HD 3/10. He presents to clinic for hospital follow up, seen in [facility] clinic recently.  Overall he improved dramatically and he is doing well.  He lost some weight  and he is trying to recuperate from this overall.  Antibiotics are being peeled back.  From the LVAD and cardiac standpoint he is doing well.  Map is a low bit elevated today and as such we will increase amlodipine to 5 mg daily from 2.5 mg daily.  We will need to monitor this closely and once his creatinine stabilizes then we might be able to wean off amlodipine and increase lisinopril or other way around.  This will make sure we are protecting his kidney for now as long as possible.  Overall doing well no bleeding no strokelike symptoms or other issues.  Continue management otherwise.  Appreciate assistance from Dr. Bhatti.     Mixed Cardiogenic and septic shock, resolved. Acute hypoxic respiratory failure secondary to Legionella PNA. Presented in acute hypoxia, febrile with CT chest consistent with bilateral infiltrates. WBC 24.5, Procal 7.95, , Lactacte 6.9. Legionella antigen positive. Completed IV antibiotics )cefazolin ended 3/1 and azithromycin ended 2/25). Component of cardiogenic shock with rise in LDH to 8531.  - Remains stable on room air.      Chronic SCHF s/p HM III LVAD. Elevated LDH in setting of infection, resolved. Implanted 2/18/20.   Stage D, NYHA Class III  ACEi/ARB  Hydralazine 100 mg po TID. continue lisinopril at current dose and will increase amlodipine to 5 mg daily  Aldosterone antagonist contraindicated due to renal dysfunction  SCD prophylaxis ICD  Fluid status euvolemic. Continue bumex 3 mg po daily.   MAP: Elevated today to 98 mmHg.  We will increase amlodipine as above  LDH: Stable continue to monitor  Anticoagulation: Coumadin per pharmacy. INR-2.5 3/19, Goal 2-3.  Antiplatelet: ASA 81 mg po daily      Acute on Chronic CKD Stave IV secondary to shock. Cr peaked at 7.1. Last HD run 3/10. Tunneled line removed 3/16/21.  - Appreciate Nephrology outpatient management.  He has been stable creatinine continuously improving off dialysis.  Continue gentle diuresis.  Continue to  monitor creatinine closely.     Wide Complex Tachycardia. History of Afib s/p DCCV and aflutter. Did not respond to Adenosine on admission. Underlying atrial flutter with 3:1. S/p CHAMP and DCCV, in SR currently.   - Continue Amiodarone.      Chronic microcytic/hypochromic anemia. Coagulopathy related to sepsis, resolved. 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  Hemoglobin is low but improved.     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 Platelets 250K --> ~ 90K with documented history of exposure to unfractionate heparin products at OSH prior to his installation at the [facility]. HIT panel tests at that time with first negative and second antibody test was positive. No known thrombosis events. DAVINA antibody negative. Per Dr. James with hematology no other cause for isolated thrombocytopenia. Immediate recovery of plts following d/c of heparin. 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   - continue warfarin     DM Type II, controlled.   A1C 6.8 1/6/21  - Management per PCP.      LVAD was interrogated at bedside.  Speed is at 5600 RPM PI was 3.4 and flow was 4.7 L/min.    I appreciate the opportunity to participate in the care of Eliseo Tanner . Please do not hesitate to contact me with any further questions.    Sincerely,   Nader Cisneros MD     [facility] Division of Cardiology

## 2021-04-13 NOTE — PROGRESS NOTES
D:  Rcvd weekly labs.  I:  Results discussed with Laine Leon NP.  All results stable. Pt advised.  No change to plan of care.  Next set of labs due Friday, April 16th.  A:  Follow up labs  P:  Pt verbalized understanding of the instructions given.  Will call VAD coordinator with further needs and questions.

## 2021-04-14 ENCOUNTER — TELEPHONE (OUTPATIENT)
Dept: INFECTIOUS DISEASES | Facility: CLINIC | Age: 66
End: 2021-04-14

## 2021-04-14 ENCOUNTER — OFFICE VISIT (OUTPATIENT)
Dept: CARDIOLOGY | Facility: OTHER | Age: 66
End: 2021-04-14
Payer: MEDICARE

## 2021-04-14 VITALS — HEART RATE: 97 BPM | WEIGHT: 200 LBS | BODY MASS INDEX: 31.32 KG/M2 | SYSTOLIC BLOOD PRESSURE: 98 MMHG

## 2021-04-14 DIAGNOSIS — I50.22 CHRONIC SYSTOLIC CONGESTIVE HEART FAILURE (H): ICD-10-CM

## 2021-04-14 DIAGNOSIS — E78.2 MIXED HYPERLIPIDEMIA: ICD-10-CM

## 2021-04-14 DIAGNOSIS — I42.8 NONISCHEMIC CARDIOMYOPATHY (H): ICD-10-CM

## 2021-04-14 DIAGNOSIS — Z95.811 LVAD (LEFT VENTRICULAR ASSIST DEVICE) PRESENT (H): ICD-10-CM

## 2021-04-14 DIAGNOSIS — I50.20 HEART FAILURE WITH REDUCED EJECTION FRACTION, NYHA CLASS III (H): ICD-10-CM

## 2021-04-14 DIAGNOSIS — I10 BENIGN ESSENTIAL HYPERTENSION: Primary | ICD-10-CM

## 2021-04-14 PROCEDURE — 99215 OFFICE O/P EST HI 40 MIN: CPT | Performed by: INTERNAL MEDICINE

## 2021-04-14 NOTE — NURSING NOTE
Chief Complaint   Patient presents with     Follow Up     Return heart failure     Medications reviewed. Vitals taken in clinic by [place] nurse.     Paulie Ramsey CMA  Heart Failure, Advanced Heart Failure & CORE  Referral Specialist &     [facility]  Cardiology  Office: [phone]

## 2021-04-14 NOTE — PATIENT INSTRUCTIONS
You were seen today by [facility] Advanced Heart Failure Cardiologist, Dr. Cisneros, in partnership with [facility] in [place], [place]       Medication or Plan of Care changes:      Increase Amlodipine to 5mg daily    Follow up:            If there are any questions about this visit please call us at [phone].      Follow the American Heart Association Diet and Lifestyle recommendations:      Limit saturated fat, trans fat, sodium, red meat, sweets and sugar-sweetened beverages.     If you choose to eat red meat, compare labels and select the leanest cuts available.    Aim for at least 150 minutes of moderate physical activity or 75 minutes of vigorous physical activity - or an equal combination of both - each week.      If you have any other questions or concerns regarding your heart care please contact your [facility] Team at [phone].

## 2021-04-14 NOTE — LETTER
4/14/2021      RE: Eliseo Tanner  [address] [place] [address]       Dear Colleague,    Thank you for the opportunity to participate in the care of your patient, Eliseo Tanner, at the [facility] [place] at [facility]. Please see a copy of my visit note below.    April 14, 2021    Eliseo Tanner is a 65 year old male with chronic systolic heart failure secondary to NICM now s/p HM 3 on 2/18, moderate CAD, HTN, ABHINAV on CPAP, DM2, CKD Stage III, ANA. His HM3 post-op course was complicated by retrosternal hematoma and bleeding in the lungs, RV failure,VT in ICU now on amiodarone and Afib w/AVR S/p DCCV on 2/28. He had pre-op proteus and enterococcus bacteremia from 2/13, s/p abx. He had an ICD placed on 3/16/2020 just before his discharge from the hospital. He felt better after his LVAD implantation. On 11/14/20, patient was directly admitted by ID from clinic after BCx positive for MSSA.  Of note, patient did have a few days of diarrhea which had improved on day of admission and resolved since 11/15/20 (C.diff negative). Abdominal imaging (US and CT abd/pelvis) revealed no intraabdominal fluid collection concerning for abscess/infectious process. TTE did not comment on vegetation. CHAMP also did not show any signs of endocarditis.  Last positive BCx on 11/14/20 showed MSSA, and abx was stopped on 12/27/20. He then had a hospitalization 2/15/21-3/17/21 at [facility] for mixed cardiogenic and distributive shock with an intermittent wide complex tachycardia. Initially requiring high doses of vasopressors and inotropes and was intubated for hypoxemia. LDH severely elevated yet thought to be related to legionella rather than actual pump thrombus. Also had acute liver injury with LFTs in the several thousands. He also required CRRT for acute renal failure. He was treated for legionella pneumonia and ultimately recovered relatively well and was  discharged in stable condition not requiring dialysis (cession of HD 3/10). He presents to clinic for follow up. Weight had been prior at around 194 lbs.  Since his discharge he has been doing well with no new issues.  He overall feels that he is recuperating well.  There were no LVAD alarms is no bleeding or other issues.  No fevers chills and blood work has been constantly improving.  Creatinine has been also improving.  He is being seen on the antibiotics and he is following with our ID team with Dr. Bhatti and william with antibiotics slowly.  Otherwise no new issues.      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  SOCIAL HISTORY:  Social History     Socioeconomic History     Marital status:      Spouse name: Not on file     Number of children: Not on file     Years of education: Not on file     Highest education level: Not on file   Occupational History     Not on file   Social Needs     Financial resource strain: Not on file     Food insecurity     Worry: Not on file     Inability: Not on file     Transportation needs     Medical: Not on file     Non-medical: Not on file   Tobacco Use     Smoking status: Former Smoker     Quit date: 1994     Years since quittin.0     Smokeless tobacco: Never Used   Substance and Sexual Activity     Alcohol use: Not Currently     Drug use: Not Currently     Sexual activity: Not on file   Lifestyle     Physical activity     Days per week: Not on file     Minutes per session: Not on file     Stress: Not on file   Relationships     Social connections     Talks on phone: Not on file     Gets together: Not on file     Attends Yazdanism  service: Not on file     Active member of club or organization: Not on file     Attends meetings of clubs or organizations: Not on file     Relationship status: Not on file     Intimate partner violence     Fear of current or ex partner: Not on file     Emotionally abused: Not on file     Physically abused: Not on file     Forced sexual activity: Not on file   Other Topics Concern     Not on file   Social History Narrative     Not on file     CURRENT MEDICATIONS:  Current Outpatient Medications   Medication     allopurinol (ZYLOPRIM) 100 MG tablet     amiodarone (PACERONE) 200 MG tablet     amLODIPine (NORVASC) 2.5 MG tablet     aspirin (ASA) 81 MG EC tablet     atorvastatin (LIPITOR) 20 MG tablet     bumetanide (BUMEX) 1 MG tablet     cephALEXin (KEFLEX) 500 MG capsule     co-enzyme Q-10 200 MG CAPS     finasteride (PROSCAR) 5 MG tablet     FLUoxetine (PROZAC) 10 MG capsule     hydrALAZINE (APRESOLINE) 50 MG tablet     insulin glargine (BASAGLAR KWIKPEN) 100 UNIT/ML pen     insulin pen needle (BD JAIME U/F) 32G X 4 MM miscellaneous     magnesium oxide (MAG-OX) 400 MG tablet     multivitamin RENAL (NEPHROCAPS/TRIPHROCAPS) 1 MG capsule     nitroGLYcerin (NITROSTAT) 0.4 MG sublingual tablet     omeprazole (PRILOSEC) 20 MG DR capsule     potassium chloride ER (KLOR-CON M) 20 MEQ CR tablet     senna-docusate (SENOKOT-S/PERICOLACE) 8.6-50 MG tablet     tamsulosin (FLOMAX) 0.4 MG capsule     warfarin ANTICOAGULANT (COUMADIN) 2 MG tablet     zolpidem (AMBIEN) 5 MG tablet     No current facility-administered medications for this visit.      ROS:   Constitutional: No fever, chills, or sweats.  ENT: No visual disturbance, ear ache, epistaxis, sore throat.   Allergies/Immunologic: Negative.   Respiratory: No cough, hemoptysis.   Cardiovascular: As per HPI.   GI: No nausea, vomiting, hematemesis, melena, or hematochezia.   : No urinary frequency, dysuria, or hematuria.   Integument: Negative.   Psychiatric: Pleasant, no major  depression noted  Neuro: No focal neurological deficits noted  Endocrinology: Negative.   Musculoskeletal: As per HPI.      EXAM:  Heart rate is 97 bpm weight is 200 pounds and map is 98 mmHg.  General: appears comfortable, alert and oriented  Head: normocephalic, atraumatic  Eyes: anicteric sclera, EOMI , PERRL  Neck: no adenopathy  Orophyarynx: moist mucosa, no lesions noted  Heart: regular, LVAD hum appreciated estimated JVP at 5 cmH2O  Lungs: CTAB, No wheezing.   Abdomen: soft, non-tender, bowel sounds present, no hepatosplenomegaly  Extremities: No LE edema today  Skin: no open lesions noted, driveline exit site covered and appears intact  Neuro: grossly non-focal     Labs:  Lab Results   Component Value Date    WBC 7.9 03/19/2021    HGB 10.1 (A) 03/19/2021    HCT 32.2 03/19/2021     03/19/2021     04/09/2021    POTASSIUM 4.2 04/09/2021    CHLORIDE 103 04/09/2021    CO2 28 04/09/2021    BUN 35 04/09/2021    CR 1.6 04/09/2021     (A) 04/09/2021    SED 72 (H) 02/16/2021    DD 4,940 (A) 03/19/2021    NTBNPI 27,781 (H) 02/15/2021    TROPI 0.377 (HH) 02/15/2021    AST 60.0 03/19/2021    ALT 35 03/19/2021    ALKPHOS 138.0 03/19/2021    BILITOTAL 0.5 03/19/2021    INR 2.5 (A) 03/19/2021    INR 2.5 (A) 03/19/2021     All imaging studies have been reviewed in person.    Assessment and Plan:   Eliseo Tanner is a 65 year old male with NICM (LVEF 15-20%), s/p HM III 2/18/20 (post op c/b retrosternal hematoma with bleeding in the lungs), s/p ICD, h/o MSSA driveline infection and bacteremia 11/5/20 on prophylactic cephalexin, h/o VT on amiodarone, nonobstructive CAD, severe MR, atrial fibrillation on warfarin, HTN, ABHINAV requiring CPAP, CKD Stage IV, DM Type II, Hyperlipidemia, and history of HIT. He underwent hospitalization from 2/15/21-3/17/21 at [facility] for mixed cardiogenic and distributive shock with an intermittent wide complex tachycardia. Initially requiring vasopressors and inotropes,  intubated for hypoxemia, severe liver and acute renal failure. He was treated for legionella pneumonia and since extubated. Transitioned to iHD with improving renal status with cession of HD 3/10. He presents to clinic for hospital follow up, seen in [facility] clinic recently.  Overall he improved dramatically and he is doing well.  He lost some weight and he is trying to recuperate from this overall.  Antibiotics are being peeled back.  From the LVAD and cardiac standpoint he is doing well.  Map is a low bit elevated today and as such we will increase amlodipine to 5 mg daily from 2.5 mg daily.  We will need to monitor this closely and once his creatinine stabilizes then we might be able to wean off amlodipine and increase lisinopril or other way around.  This will make sure we are protecting his kidney for now as long as possible.  Overall doing well no bleeding no strokelike symptoms or other issues.  Continue management otherwise.  Appreciate assistance from Dr. Bhatti.     Mixed Cardiogenic and septic shock, resolved. Acute hypoxic respiratory failure secondary to Legionella PNA. Presented in acute hypoxia, febrile with CT chest consistent with bilateral infiltrates. WBC 24.5, Procal 7.95, , Lactacte 6.9. Legionella antigen positive. Completed IV antibiotics )cefazolin ended 3/1 and azithromycin ended 2/25). Component of cardiogenic shock with rise in LDH to 8531.  - Remains stable on room air.      Chronic SCHF s/p HM III LVAD. Elevated LDH in setting of infection, resolved. Implanted 2/18/20.   Stage D, NYHA Class III  ACEi/ARB  Hydralazine 100 mg po TID. continue lisinopril at current dose and will increase amlodipine to 5 mg daily  Aldosterone antagonist contraindicated due to renal dysfunction  SCD prophylaxis ICD  Fluid status euvolemic. Continue bumex 3 mg po daily.   MAP: Elevated today to 98 mmHg.  We will increase amlodipine as above  LDH: Stable continue to monitor  Anticoagulation: Coumadin per  pharmacy. INR-2.5 3/19, Goal 2-3.  Antiplatelet: ASA 81 mg po daily      Acute on Chronic CKD Stave IV secondary to shock. Cr peaked at 7.1. Last HD run 3/10. Tunneled line removed 3/16/21.  - Appreciate Nephrology outpatient management.  He has been stable creatinine continuously improving off dialysis.  Continue gentle diuresis.  Continue to monitor creatinine closely.     Wide Complex Tachycardia. History of Afib s/p DCCV and aflutter. Did not respond to Adenosine on admission. Underlying atrial flutter with 3:1. S/p CHAMP and DCCV, in SR currently.   - Continue Amiodarone.      Chronic microcytic/hypochromic anemia. Coagulopathy related to sepsis, resolved. 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  Hemoglobin is low but improved.     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 Platelets 250K --> ~ 90K with documented history of exposure to unfractionate heparin products at OSH prior to his installation at the [facility]. HIT panel tests at that time with first negative and second antibody test was positive. No known thrombosis events. DAVINA antibody negative. Per Dr. James with hematology no other cause for isolated thrombocytopenia. Immediate recovery of plts following d/c of heparin. 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   - continue warfarin     DM Type II, controlled.   A1C 6.8 1/6/21  - Management per PCP.      LVAD was interrogated at bedside.  Speed is at 5600 RPM PI was 3.4 and flow was 4.7 L/min.    I appreciate the opportunity to participate in the care of Eliseo Tanner . Please do not hesitate to contact me with any further questions.    Sincerely,   Nader Cisneros MD     [facility] Division of Cardiology

## 2021-04-15 RX ORDER — AMLODIPINE BESYLATE 5 MG/1
5 TABLET ORAL DAILY
Qty: 90 TABLET | Refills: 3 | Status: SHIPPED | OUTPATIENT
Start: 2021-04-15 | End: 2021-12-08

## 2021-04-15 NOTE — NURSING NOTE
Called pt to follow up to appointment.    Confirmed Amlodipine increase with patient, new script sent to pharmacy.  Discussed follow up in 2 months with Dr. Cisneros in [place].  Message sent to scheduling for [place] appointment.

## 2021-04-16 LAB
LDH SERPL-CCNC: 300 U/L (ref 120–246)
LDH-L: 300 U/L (ref 120–246)

## 2021-04-19 ENCOUNTER — CARE COORDINATION (OUTPATIENT)
Dept: CARDIOLOGY | Facility: CLINIC | Age: 66
End: 2021-04-19

## 2021-04-19 DIAGNOSIS — I50.22 CHRONIC SYSTOLIC CONGESTIVE HEART FAILURE (H): ICD-10-CM

## 2021-04-19 DIAGNOSIS — Z95.811 LVAD (LEFT VENTRICULAR ASSIST DEVICE) PRESENT (H): ICD-10-CM

## 2021-04-19 DIAGNOSIS — I25.10 CORONARY ARTERY DISEASE INVOLVING NATIVE CORONARY ARTERY OF NATIVE HEART WITHOUT ANGINA PECTORIS: ICD-10-CM

## 2021-04-19 RX ORDER — AMIODARONE HYDROCHLORIDE 200 MG/1
200 TABLET ORAL DAILY
Qty: 90 TABLET | Refills: 3 | Status: SHIPPED | OUTPATIENT
Start: 2021-04-19 | End: 2022-04-10

## 2021-04-22 RX ORDER — ATORVASTATIN CALCIUM 20 MG/1
20 TABLET, FILM COATED ORAL DAILY
Qty: 90 TABLET | Refills: 3 | Status: SHIPPED | OUTPATIENT
Start: 2021-04-22 | End: 2022-04-12

## 2021-04-23 ENCOUNTER — ANTICOAGULATION THERAPY VISIT (OUTPATIENT)
Dept: ANTICOAGULATION | Facility: CLINIC | Age: 66
End: 2021-04-23

## 2021-04-23 DIAGNOSIS — I48.0 PAROXYSMAL ATRIAL FIBRILLATION (H): ICD-10-CM

## 2021-04-23 DIAGNOSIS — I50.22 CHRONIC SYSTOLIC CONGESTIVE HEART FAILURE (H): ICD-10-CM

## 2021-04-23 DIAGNOSIS — Z95.811 LVAD (LEFT VENTRICULAR ASSIST DEVICE) PRESENT (H): ICD-10-CM

## 2021-04-23 LAB
INR PPP: 1.6 (ref 0.9–1.1)
INR PPP: 2.1 (ref 0.9–1.1)
INR PPP: 2.5 (ref 0.9–1.1)
LDH SERPL-CCNC: 302 U/L (ref 120–246)

## 2021-04-23 NOTE — PROGRESS NOTES
ANTICOAGULATION FOLLOW-UP CLINIC VISIT    Patient Name:  Eliseo Tanner  Date:  2021  Contact Type:  Telephone    SUBJECTIVE:  Patient Findings     Positives:  Change in medications (amlodipine dose increased)    Comments:  Spoke with spouse, Veronique.  She reports Eliseo has been going in for weekly labs, but the [facility] has not received an INR result since 3/19.  Called [facility]--spoke with lab person.  She said he has had his INR checked on 21 and 21--results entered into dVisit.  Confirmed with lab that they have the correct fax number and also reminded Veronique to call the [facility] if Eliseo has an INR drawn and they do not hear from us.  Veronique reports Eliseo has been taking 4 mg of warfarin daily since discharge from hospital on 3/19/21.          Clinical Outcomes     Comments:  Spoke with spouse, Veronique.  She reports Eliseo has been going in for weekly labs, but the [facility] has not received an INR result since 3/19.  Called [facility]--spoke with lab person.  She said he has had his INR checked on 21 and 21--results entered into dVisit.  Confirmed with lab that they have the correct fax number and also reminded Veronique to call the [facility] if Eliseo has an INR drawn and they do not hear from us.  Veronique reports Eliseo has been taking 4 mg of warfarin daily since discharge from hospital on 3/19/21.             OBJECTIVE    Recent labs: (last 7 days)     21   INR 2.1*       ASSESSMENT / PLAN  INR assessment THER    Recheck INR In: 1 WEEK    INR Location Outside lab      Anticoagulation Summary  As of 2021    INR goal:  2.0-3.0   TTR:  91.5 % (10.4 mo)   INR used for dosin.1 (2021)   Warfarin maintenance plan:  4 mg (4 mg x 1) every day   Full warfarin instructions:  4 mg every day   Weekly warfarin total:  28 mg   No change documented:  Krysta Mcdaniel RN   Plan last modified:  Young Shine RN (3/22/2021)   Next INR check:  2021   Priority:   Critical   Target end date:  Indefinite    Indications    LVAD (left ventricular assist device) present (H) [Z95.811]  Chronic systolic congestive heart failure (H) [I50.22]  Paroxysmal atrial fibrillation (H) [I48.0]             Anticoagulation Episode Summary     INR check location:      Preferred lab:      Send INR reminders to:  [facility]    Comments:  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   3  placed 2/18/2020, 81mg ASA, Speak to Veronique ramírez at [phone]  4/14/2020:  Lab: [facility]:  ph: [phone] opt 5;   fax: [phone]      Anticoagulation Care Providers     Provider Role Specialty Phone number    Nader Cisneros MD Referring Advanced Heart Failure and Transplant Cardiology [phone]            See the Encounter Report to view Anticoagulation Flowsheet and Dosing Calendar (Go to Encounters tab in chart review, and find the Anticoagulation Therapy Visit)    Spoke with Veronique ramírez.  Eliseo has been drinking one bottle of Boost or Ensure daily since 3/19/2021.    Patient had LVAD placed on:   2/18/2020  Type of LVAD:  3   Patient's current Aspirin dose: 81 mg daily  LVAD Protocol followed: Yes      Krysta Mcdaniel RN

## 2021-04-25 ENCOUNTER — HEALTH MAINTENANCE LETTER (OUTPATIENT)
Age: 66
End: 2021-04-25

## 2021-04-30 ENCOUNTER — ANTICOAGULATION THERAPY VISIT (OUTPATIENT)
Dept: ANTICOAGULATION | Facility: CLINIC | Age: 66
End: 2021-04-30

## 2021-04-30 DIAGNOSIS — I50.22 CHRONIC SYSTOLIC CONGESTIVE HEART FAILURE (H): ICD-10-CM

## 2021-04-30 DIAGNOSIS — I48.0 PAROXYSMAL ATRIAL FIBRILLATION (H): ICD-10-CM

## 2021-04-30 DIAGNOSIS — Z95.811 LVAD (LEFT VENTRICULAR ASSIST DEVICE) PRESENT (H): ICD-10-CM

## 2021-04-30 LAB
INR PPP: 3.1 (ref 0.9–1.1)
LDH SERPL-CCNC: 297 U/L (ref 120–246)

## 2021-04-30 NOTE — PROGRESS NOTES
4/30/21 Addendum:  Eliseo called back.  Went over recommendations below.  He states he has not had changes in health, diet, medications.  Krysta Mcdaniel RN

## 2021-04-30 NOTE — PROGRESS NOTES
ANTICOAGULATION FOLLOW-UP CLINIC VISIT    Patient Name:  Eliseo Tanner  Date:  4/30/2021  Contact Type:  Telephone    SUBJECTIVE:         OBJECTIVE    Recent labs: (last 7 days)     04/30/21   INR 3.1*       ASSESSMENT / PLAN  No question data found.  Anticoagulation Summary  As of 4/30/2021    INR goal:  2.0-3.0   TTR:  91.3 % (10.4 mo)   INR used for dosing:  3.1 (4/30/2021)   Warfarin maintenance plan:  4 mg (4 mg x 1) every day   Full warfarin instructions:  4/30: 3 mg; Otherwise 4 mg every day   Weekly warfarin total:  28 mg   Plan last modified:  Young Shine RN (3/22/2021)   Next INR check:  5/7/2021   Priority:  Critical   Target end date:  Indefinite    Indications    LVAD (left ventricular assist device) present (H) [Z95.811]  Chronic systolic congestive heart failure (H) [I50.22]  Paroxysmal atrial fibrillation (H) [I48.0]             Anticoagulation Episode Summary     INR check location:      Preferred lab:      Send INR reminders to:  [facility]    Comments:  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  HM 3  placed 2/18/2020, 81mg ASA, Speak to spouse, Veronique at [phone]  4/14/2020:  Lab: [facility]:  ph: [phone] opt 5;   fax: [phone]      Anticoagulation Care Providers     Provider Role Specialty Phone number    Nader Cisneros MD Referring Advanced Heart Failure and Transplant Cardiology [phone]            See the Encounter Report to view Anticoagulation Flowsheet and Dosing Calendar (Go to Encounters tab in chart review, and find the Anticoagulation Therapy Visit)    Left message for patient with results and dosing recommendations. Asked patient to call back to report any missed doses, falls, signs and symptoms of bleeding or clotting, any changes in health, medication, or diet. Asked patient to call back with any questions or concerns.      Gloria Abbasi RN

## 2021-05-04 ENCOUNTER — CARE COORDINATION (OUTPATIENT)
Dept: CARDIOLOGY | Facility: CLINIC | Age: 66
End: 2021-05-04

## 2021-05-04 DIAGNOSIS — I50.22 CHRONIC SYSTOLIC CONGESTIVE HEART FAILURE (H): Primary | ICD-10-CM

## 2021-05-04 NOTE — PROGRESS NOTES
D:  Rcvd final, serial, LDH levels (4/4).  Called pt to discuss. Pt reports stable numbers, no vad alarms, denies changes to LVAD parameters or heart failure symptoms.  Current LVAD numbers, Speed: 5500, Flow: 4.5, PI: 3.3, Power: 4.5.    I:  Discussed results and situation with Laine Leon NP.  Plan: transition to monthly LDH levels for 3 months  . Pt advised.  Orders sent to [facility]  A:  Follow up labs  P:  Pt verbalized understanding of the instructions given.  Will call VAD coordinator with further needs and questions.

## 2021-05-07 ENCOUNTER — TELEPHONE (OUTPATIENT)
Dept: ANTICOAGULATION | Facility: CLINIC | Age: 66
End: 2021-05-07

## 2021-05-07 DIAGNOSIS — Z95.811 LVAD (LEFT VENTRICULAR ASSIST DEVICE) PRESENT (H): ICD-10-CM

## 2021-05-07 DIAGNOSIS — I48.0 PAROXYSMAL ATRIAL FIBRILLATION (H): ICD-10-CM

## 2021-05-07 DIAGNOSIS — I50.22 CHRONIC SYSTOLIC CONGESTIVE HEART FAILURE (H): ICD-10-CM

## 2021-05-07 NOTE — TELEPHONE ENCOUNTER
ANTICOAGULATION     Eliseo Tanner is overdue for INR check.      Spoke with lEiseo. He will have his  INR checked 5/10/21.    Krysta Mcdaniel RN

## 2021-05-10 ENCOUNTER — ANTICOAGULATION THERAPY VISIT (OUTPATIENT)
Dept: ANTICOAGULATION | Facility: CLINIC | Age: 66
End: 2021-05-10

## 2021-05-10 DIAGNOSIS — I50.22 CHRONIC SYSTOLIC CONGESTIVE HEART FAILURE (H): ICD-10-CM

## 2021-05-10 DIAGNOSIS — Z95.811 LVAD (LEFT VENTRICULAR ASSIST DEVICE) PRESENT (H): ICD-10-CM

## 2021-05-10 DIAGNOSIS — I48.0 PAROXYSMAL ATRIAL FIBRILLATION (H): ICD-10-CM

## 2021-05-10 LAB — INR PPP: 2.5 (ref 0.9–1.1)

## 2021-05-10 NOTE — PROGRESS NOTES
ANTICOAGULATION FOLLOW-UP CLINIC VISIT    Patient Name:  Eliseo Tanner  Date:  5/10/2021  Contact Type:  Telephone    SUBJECTIVE:  Patient Findings     Comments:  Spoke with patient. Gave them their lab results and new warfarin recommendation.  No changes in health, medication, or diet. No missed doses, no falls. No signs or symptoms of bleed or clotting.           Clinical Outcomes     Negatives:  Major bleeding event, Thromboembolic event, Anticoagulation-related hospital admission, Anticoagulation-related ED visit, Anticoagulation-related fatality    Comments:  Spoke with patient. Gave them their lab results and new warfarin recommendation.  No changes in health, medication, or diet. No missed doses, no falls. No signs or symptoms of bleed or clotting.              OBJECTIVE    Recent labs: (last 7 days)     05/10/21   INR 2.5*       ASSESSMENT / PLAN  INR assessment THER    Recheck INR In: 2 WEEKS    INR Location Outside lab      Anticoagulation Summary  As of 5/10/2021    INR goal:  2.0-3.0   TTR:  90.8 % (10.4 mo)   INR used for dosin.5 (5/10/2021)   Warfarin maintenance plan:  4 mg (4 mg x 1) every day   Full warfarin instructions:  4 mg every day   Weekly warfarin total:  28 mg   No change documented:  Gianna Allen RN   Plan last modified:  Young Shine RN (3/22/2021)   Next INR check:  2021   Priority:  Critical   Target end date:  Indefinite    Indications    LVAD (left ventricular assist device) present (H) [Z95.811]  Chronic systolic congestive heart failure (H) [I50.22]  Paroxysmal atrial fibrillation (H) [I48.0]             Anticoagulation Episode Summary     INR check location:      Preferred lab:      [facility] INR reminders to:  [facility]    Comments:  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  HM 3  placed 2020, 81mg ASA, Speak to spouse, Veronique at [phone]  2020:  Lab: [facility]:  ph:  [phone] opt 5;   fax: [phone]      Anticoagulation Care Providers     Provider Role Specialty Phone number    Nader Cisneros MD Referring Advanced Heart Failure and Transplant Cardiology [phone]            See the Encounter Report to view Anticoagulation Flowsheet and Dosing Calendar (Go to Encounters tab in chart review, and find the Anticoagulation Therapy Visit)    INR/CFX/F2 Result: 2.5  Goal Range: 2-3  Assessment: negative for changes  Dosing Adjustment: none made  Next INR/CFX/F2: two weeks  Protocol Followed: 2-3    Patient had LVAD placed on:   2/18/20  Type of LVAD: HM3  Patient's current Aspirin dose: 81mg  LVAD Protocol followed: yes     PACO MARQUEZ RN

## 2021-05-14 ENCOUNTER — ANTICOAGULATION THERAPY VISIT (OUTPATIENT)
Dept: ANTICOAGULATION | Facility: CLINIC | Age: 66
End: 2021-05-14

## 2021-05-14 DIAGNOSIS — I50.22 CHRONIC SYSTOLIC CONGESTIVE HEART FAILURE (H): ICD-10-CM

## 2021-05-14 DIAGNOSIS — Z95.811 LVAD (LEFT VENTRICULAR ASSIST DEVICE) PRESENT (H): ICD-10-CM

## 2021-05-14 DIAGNOSIS — I48.0 PAROXYSMAL ATRIAL FIBRILLATION (H): ICD-10-CM

## 2021-05-14 LAB — INR PPP: 2.6 (ref 0.9–1.1)

## 2021-05-14 NOTE — PROGRESS NOTES
ANTICOAGULATION FOLLOW-UP CLINIC VISIT    Patient Name:  Eliseo Tanner  Date:  2021  Contact Type:  Telephone    SUBJECTIVE:  Patient Findings         Clinical Outcomes     Negatives:  Major bleeding event, Thromboembolic event, Anticoagulation-related hospital admission, Anticoagulation-related ED visit, Anticoagulation-related fatality           OBJECTIVE    Recent labs: (last 7 days)     21   INR 2.6*       ASSESSMENT / PLAN  INR assessment THER    Recheck INR In: 2 WEEKS    INR Location Clinic      Anticoagulation Summary  As of 2021    INR goal:  2.0-3.0   TTR:  90.8 % (10.4 mo)   INR used for dosin.6 (2021)   Warfarin maintenance plan:  4 mg (4 mg x 1) every day   Full warfarin instructions:  4 mg every day   Weekly warfarin total:  28 mg   No change documented:  Luiza Perkins, RN   Plan last modified:  Young Bustos RN (3/22/2021)   Next INR check:  2021   Priority:  Critical   Target end date:  Indefinite    Indications    LVAD (left ventricular assist device) present (H) [Z95.811]  Chronic systolic congestive heart failure (H) [I50.22]  Paroxysmal atrial fibrillation (H) [I48.0]             Anticoagulation Episode Summary     INR check location:      Preferred lab:      Send INR reminders to:  [facility]    Comments:  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  HM 3  placed 2020, 81mg ASA, Speak to spouse, Veronique at [phone]  2020:  [facility]: [facility]:  ph: [phone] opt 5;   fax: [phone]      Anticoagulation Care Providers     Provider Role Specialty Phone number    Nader Cisneros MD Referring Advanced Heart Failure and Transplant Cardiology [phone]            See the Encounter Report to view Anticoagulation Flowsheet and Dosing Calendar (Go to Encounters tab in chart review, and find the Anticoagulation Therapy Visit)    Spoke with patient. Gave them their lab results and new  warfarin recommendation.  No changes in health, medication, or diet. No missed doses, no falls. No signs or symptoms of bleed or clotting.       Patient had LVAD placed on:   2/18/20  Type of LVAD: HM 3  Patient's current Aspirin dose: ASA 81mg Daily  LVAD Protocol followed: Yes   If Not Followed Explanation:  N/A    Luiza Perkins RN

## 2021-05-21 NOTE — PROGRESS NOTES
Called patient/caregiver to check in 2 days post hospitalization discharge. Pt reports VAD parameters stable and weight 208/209 (new dry weight). Reviewed medications and answered any questions.     Pt reports one dose of Bumex 1 mg taken for some mild SOB.  Reviewed symptoms, diuretics and potassium dosing with primary cardiologist, Dr. Cisneros.  Rcvd orders for Bumex 1 mg daily and hold potassium, with repeat BMP Friday to re-evaluate dosing. Pt verbalized understanding of the plan.    Patient reports one small dizzy spell yesterday with position changes.  Symptoms resolved independently.     Encouraged pt to page VAD Coordinator with any issues or questions. Pt verbalizes understanding.    
Aeb pt in need of diet education

## 2021-05-28 ENCOUNTER — ANTICOAGULATION THERAPY VISIT (OUTPATIENT)
Dept: ANTICOAGULATION | Facility: CLINIC | Age: 66
End: 2021-05-28

## 2021-05-28 DIAGNOSIS — I50.22 CHRONIC SYSTOLIC CONGESTIVE HEART FAILURE (H): ICD-10-CM

## 2021-05-28 DIAGNOSIS — I48.0 PAROXYSMAL ATRIAL FIBRILLATION (H): ICD-10-CM

## 2021-05-28 DIAGNOSIS — Z95.811 LVAD (LEFT VENTRICULAR ASSIST DEVICE) PRESENT (H): ICD-10-CM

## 2021-05-28 LAB
INR PPP: 2.4 (ref 0.9–1.1)
LDH SERPL-CCNC: 314 U/L (ref 120–246)

## 2021-05-28 NOTE — PROGRESS NOTES
ANTICOAGULATION FOLLOW-UP CLINIC VISIT    Patient Name:  Eliseo Tanner  Date:  2021  Contact Type:  Telephone    SUBJECTIVE:  Patient Findings     Comments:  Spoke with patient. Gave them their lab results and new warfarin recommendation.  No changes in health, medication, or diet. No missed doses, no falls. No signs or symptoms of bleed or clotting.           Clinical Outcomes     Negatives:  Major bleeding event, Thromboembolic event, Anticoagulation-related hospital admission, Anticoagulation-related ED visit, Anticoagulation-related fatality    Comments:  Spoke with patient. Gave them their lab results and new warfarin recommendation.  No changes in health, medication, or diet. No missed doses, no falls. No signs or symptoms of bleed or clotting.              OBJECTIVE    Recent labs: (last 7 days)     21   INR 2.4*       ASSESSMENT / PLAN  INR assessment THER    Recheck INR In: 2 WEEKS    INR Location Outside lab      Anticoagulation Summary  As of 2021    INR goal:  2.0-3.0   TTR:  90.8 % (10.4 mo)   INR used for dosin.4 (2021)   Warfarin maintenance plan:  4 mg (4 mg x 1) every day   Full warfarin instructions:  4 mg every day   Weekly warfarin total:  28 mg   No change documented:  Gianna Allen RN   Plan last modified:  Young Bustos RN (3/22/2021)   Next INR check:  2021   Priority:  Critical   Target end date:  Indefinite    Indications    LVAD (left ventricular assist device) present (H) [Z95.811]  Chronic systolic congestive heart failure (H) [I50.22]  Paroxysmal atrial fibrillation (H) [I48.0]             Anticoagulation Episode Summary     INR check location:      Preferred lab:      [facility]ers [facility]:  [facility]    Comments:  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  HM 3  placed 2020, 81mg ASA, Speak to spouse, Veronique at [phone]  2020:  Lab: [facility]:  ph: [phone]  opt 5;   fax: [phone]      Anticoagulation Care Providers     Provider Role Specialty Phone number    Nader Cisnreos MD Referring Advanced Heart Failure and Transplant Cardiology [phone]            See the Encounter Report to view Anticoagulation Flowsheet and Dosing Calendar (Go to Encounters tab in chart review, and find the Anticoagulation Therapy Visit)    INR/CFX/F2 Result: 2.4  Goal Range: 2-3  Assessment: negative for changes  Dosing Adjustment: none made  Next INR/CFX/F2: two weeks  Protocol Followed: 2-3    Patient had LVAD placed on:   2/18/20  Type of LVAD: HM3  Patient's current Aspirin dose: 81mg  LVAD Protocol followed: yes     PACO MARQUEZ RN

## 2021-06-01 ENCOUNTER — TELEPHONE (OUTPATIENT)
Dept: ANTICOAGULATION | Facility: CLINIC | Age: 66
End: 2021-06-01

## 2021-06-01 ENCOUNTER — CARE COORDINATION (OUTPATIENT)
Dept: CARDIOLOGY | Facility: CLINIC | Age: 66
End: 2021-06-01

## 2021-06-01 DIAGNOSIS — I50.22 CHRONIC SYSTOLIC CONGESTIVE HEART FAILURE (H): ICD-10-CM

## 2021-06-01 DIAGNOSIS — Z95.811 LVAD (LEFT VENTRICULAR ASSIST DEVICE) PRESENT (H): ICD-10-CM

## 2021-06-01 DIAGNOSIS — I48.0 PAROXYSMAL ATRIAL FIBRILLATION (H): ICD-10-CM

## 2021-06-01 NOTE — TELEPHONE ENCOUNTER
ANTICOAGULATION  MANAGEMENT     Interacting Medication Review    Interacting medication(s): Sulfamethoxazole-Trimethoprim (Bactrim) with warfarin.    Duration: 2 weeks    Indication: Prophylaxis    New medication?: yes, Concurrent use of SULFAMETHOXAZOLE and WARFARIN may result in increased warfarin exposure.       PLAN     Consulted with Sarah LIGHT RPH for dosing recommendations and recheck INR date. Patient has been on Bactrim in the past-November 2020. INR 2.4 prior to starting Bactrim-patient developed diarrhea with subsequent supra therapeutic INR with hospital admission. Will advise patient to recheck INR on Friday this week. Advised to call [facility] right away if he develops diarrhea before Friday for dose adjustment. Patient verbalized understanding.     Spoke with Eliseo    Anticoagulation Calendar updated    PACO MARQUEZ RN

## 2021-06-01 NOTE — PROGRESS NOTES
D: Pt wife called to report a new hematoma to his knee that is about the size of a half dollar & painful.  She informed me that Eliseo has had surgery to that knee before, and bumped it recently, now has developed the hematoma following that.  I:  Pt instructed to have either a primary care, emergency or orthopedic, MD assess this injury right away.  Pt has appt at 2:30 today with primary MD.  A:  Knee hematoma  P:  Pt verbalized understanding of the instructions given.  Will call VAD coordinator with further needs and questions.    D:  Rcvd LDH level (1 of 3 monthly) Level 314, which is stable.  Pt not having changes to LVAD parameters, tea colored urine or worsened heart failure symptoms.  I:  Discussed with Laine DHILLON.  Plan: No change to plan of care.  Next LDH level due end of June.  Pt advised.  A:  LDH follow up  P:  Pt verbalized understanding of the instructions given.  Will call VAD coordinator with further needs and questions.

## 2021-06-03 ENCOUNTER — TELEPHONE (OUTPATIENT)
Dept: ANTICOAGULATION | Facility: CLINIC | Age: 66
End: 2021-06-03

## 2021-06-03 DIAGNOSIS — I48.0 PAROXYSMAL ATRIAL FIBRILLATION (H): ICD-10-CM

## 2021-06-03 DIAGNOSIS — Z95.811 LVAD (LEFT VENTRICULAR ASSIST DEVICE) PRESENT (H): ICD-10-CM

## 2021-06-03 DIAGNOSIS — I50.22 CHRONIC SYSTOLIC CONGESTIVE HEART FAILURE (H): ICD-10-CM

## 2021-06-03 NOTE — TELEPHONE ENCOUNTER
Received a message from Eliseo.  He stated he will not be taking the antibiotic that was recently prescribed (bactrim).  He states he will not be checking INR on 6/4/21.    Called Eliseo back and left him a message recommending he continue warfarin 4 mg daily and to recheck INR when previously planned on  6/11/21.  Asked him to call the [facility] with any questions.  Krysta Mcdaniel RN

## 2021-06-07 ENCOUNTER — TELEPHONE (OUTPATIENT)
Dept: INFECTIOUS DISEASES | Facility: CLINIC | Age: 66
End: 2021-06-07

## 2021-06-07 NOTE — TELEPHONE ENCOUNTER
Eliseo and his wife had called regarding a bactrim prescription recently received from PCP. He had a leg wound from bumping it. He was initially prescribed tmp/smx by his PCP-he was told that the tmp/smx may help decrease the swelling. He got a second opinion with x-ray-this physician did not think it appeared infected either. I do not have access to these records. The pharmacy was concerned about filling tmp/smx script because of the DDI with coumadin. We discussed that if there is not a concern for infection based on the other provider's assessments then he should not take bactrim. If he develops signs or symptoms concerning for infection he will notify his PCP to get a different antibiotic. He remains on cephalexin BID.     Negar Bhatti   Infectious Diseases

## 2021-06-10 ENCOUNTER — ANCILLARY PROCEDURE (OUTPATIENT)
Dept: CARDIOLOGY | Facility: CLINIC | Age: 66
End: 2021-06-10
Attending: NURSE PRACTITIONER
Payer: MEDICARE

## 2021-06-10 ENCOUNTER — TELEPHONE (OUTPATIENT)
Dept: INFECTIOUS DISEASES | Facility: CLINIC | Age: 66
End: 2021-06-10

## 2021-06-10 DIAGNOSIS — Z95.810 S/P ICD (INTERNAL CARDIAC DEFIBRILLATOR) PROCEDURE: ICD-10-CM

## 2021-06-10 PROCEDURE — 93296 REM INTERROG EVL PM/IDS: CPT

## 2021-06-10 PROCEDURE — 93295 DEV INTERROG REMOTE 1/2/MLT: CPT | Performed by: INTERNAL MEDICINE

## 2021-06-10 NOTE — TELEPHONE ENCOUNTER
Antonette,  If the patient is concerned about pneumonia, chills then he needs to be seen in urgent care and/or ED if necessary.   Can you please relay that message?

## 2021-06-10 NOTE — TELEPHONE ENCOUNTER
[facility] Call Center    Phone Message    May a detailed message be left on voicemail: yes     Reason for Call: Symptoms or Concerns     If patient has red-flag symptoms, warm transfer to triage line    Current symptom or concern: Per Patient states wanting to get a call back in regards having sinus problems and chills. Patient states was told to contact Dr. Bhatti to let her know about it as patients wife is concerned about pneumonia. Please advise    Symptoms have been present for:  1 week(s)    Has patient previously been seen for this? No    By n/a    Date: 6/10/2021    Are there any new or worsening symptoms? No, think getting better.      Action Taken: Message routed to:  [facility] ID    Travel Screening: Not Applicable

## 2021-06-11 ENCOUNTER — ANTICOAGULATION THERAPY VISIT (OUTPATIENT)
Dept: ANTICOAGULATION | Facility: CLINIC | Age: 66
End: 2021-06-11

## 2021-06-11 DIAGNOSIS — I48.0 PAROXYSMAL ATRIAL FIBRILLATION (H): ICD-10-CM

## 2021-06-11 DIAGNOSIS — Z95.811 LVAD (LEFT VENTRICULAR ASSIST DEVICE) PRESENT (H): ICD-10-CM

## 2021-06-11 DIAGNOSIS — I50.22 CHRONIC SYSTOLIC CONGESTIVE HEART FAILURE (H): ICD-10-CM

## 2021-06-11 LAB — INR PPP: 2.6 (ref 0.9–1.1)

## 2021-06-11 NOTE — PROGRESS NOTES
ANTICOAGULATION FOLLOW-UP CLINIC VISIT    Patient Name:  Eliseo Tanner  Date:  2021  Contact Type:  Telephone    SUBJECTIVE:         OBJECTIVE    Recent labs: (last 7 days)     21   INR 2.6*       ASSESSMENT / PLAN  No question data found.  Anticoagulation Summary  As of 2021    INR goal:  2.0-3.0   TTR:  89.8 % (10.6 mo)   INR used for dosin.6 (2021)   Warfarin maintenance plan:  4 mg (4 mg x 1) every day   Full warfarin instructions:  4 mg every day   Weekly warfarin total:  28 mg   No change documented:  Gloria Abbasi RN   Plan last modified:  Young Bustos RN (3/22/2021)   Next INR check:  2021   Priority:  Critical   Target end date:  Indefinite    Indications    LVAD (left ventricular assist device) present (H) [Z95.811]  Chronic systolic congestive heart failure (H) [I50.22]  Paroxysmal atrial fibrillation (H) [I48.0]             Anticoagulation Episode Summary     INR check location:      Preferred lab:      Send INR reminders to:  [facility]    Comments:  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  HM 3  placed 2020, 81mg ASA, Speak to spouseVeronique at [phone]  2020:  [facility]: [facility]:  ph: [phone] opt 5;   fax: [phone]      Anticoagulation Care Providers     Provider Role Specialty Phone number    Nader Cisneros MD Referring Advanced Heart Failure and Transplant Cardiology [phone]            See the Encounter Report to view Anticoagulation Flowsheet and Dosing Calendar (Go to Encounters tab in chart review, and find the Anticoagulation Therapy Visit)    Spoke with patient.     Gloria Abbasi RN

## 2021-06-14 ENCOUNTER — ANTICOAGULATION THERAPY VISIT (OUTPATIENT)
Dept: ANTICOAGULATION | Facility: CLINIC | Age: 66
End: 2021-06-14

## 2021-06-14 DIAGNOSIS — I48.0 PAROXYSMAL ATRIAL FIBRILLATION (H): ICD-10-CM

## 2021-06-14 DIAGNOSIS — I50.22 CHRONIC SYSTOLIC CONGESTIVE HEART FAILURE (H): ICD-10-CM

## 2021-06-14 DIAGNOSIS — Z95.811 LVAD (LEFT VENTRICULAR ASSIST DEVICE) PRESENT (H): ICD-10-CM

## 2021-06-14 NOTE — PROGRESS NOTES
ANTICOAGULATION FOLLOW-UP CLINIC VISIT    Patient Name:  Eliseo Tanner  Date:  6/14/2021  Contact Type:  Telephone    SUBJECTIVE:         OBJECTIVE    Recent labs: (last 7 days)     06/11/21   INR 2.6*       ASSESSMENT / PLAN  INR assessment THER    Recheck INR In: 2 WEEKS    INR Location Clinic      Anticoagulation Summary  As of 6/14/2021    INR goal:  2.0-3.0   TTR:  89.7 % (10.5 mo)   INR used for dosing:     Warfarin maintenance plan:  4 mg (4 mg x 1) every day   Full warfarin instructions:  4 mg every day   Weekly warfarin total:  28 mg   Plan last modified:  Young Bustos, RN (3/22/2021)   Next INR check:  6/28/2021   Priority:  Critical   Target end date:  Indefinite    Indications    LVAD (left ventricular assist device) present (H) [Z95.811]  Chronic systolic congestive heart failure (H) [I50.22]  Paroxysmal atrial fibrillation (H) [I48.0]             Anticoagulation Episode Summary     INR check location:      Preferred lab:      Send INR reminders to:  [facility]    Comments:  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  HM 3  placed 2/18/2020, 81mg ASA, Speak to spouse, Veronique at [phone]  4/14/2020:  Lab: [facility]:  ph: [phone] opt 5;   fax: [phone]      Anticoagulation Care Providers     Provider Role Specialty Phone number    Nader Cisneros MD Referring Advanced Heart Failure and Transplant Cardiology [phone]            See the Encounter Report to view Anticoagulation Flowsheet and Dosing Calendar (Go to Encounters tab in chart review, and find the Anticoagulation Therapy Visit)    Left message for patient with results and dosing recommendations. Asked patient to call back to report any missed doses, falls, signs and symptoms of bleeding or clotting, any changes in health, medication, or diet. Asked patient to call back with any questions or concerns.  Patient had LVAD placed on:   2/18/2020  Type of LVAD:  HM3  Patient's current Aspirin dose: 81mg  LVAD Protocol followed:  Yes  Destiny Rogel RN

## 2021-06-14 NOTE — PLAN OF CARE
Major Shift Events:  -Patient extubated at ~1200 without complication. Patient alert and oriented x3, forgetful.  -Dobutamine turned off; VBG pending  -CRRT pulling at goal (net 0-25cc/hr)  -Significant bleeding from PICC; pressure held for 90+ minutes total during shift. Multiple pressure dressings placed. Bivalrudin gtt held at 1800 for 2 hours in attempt to control bleeding. Reassess at 2000.    Plan: Continue to monitor  For vital signs and complete assessments, please see documentation flowsheets.     99

## 2021-06-23 DIAGNOSIS — I42.8 NONISCHEMIC CARDIOMYOPATHY (H): ICD-10-CM

## 2021-06-23 DIAGNOSIS — I48.0 PAROXYSMAL ATRIAL FIBRILLATION (H): ICD-10-CM

## 2021-06-23 DIAGNOSIS — Z95.811 LVAD (LEFT VENTRICULAR ASSIST DEVICE) PRESENT (H): Primary | ICD-10-CM

## 2021-06-23 LAB
MDC_IDC_EPISODE_DTM: NORMAL
MDC_IDC_EPISODE_DURATION: 234 S
MDC_IDC_EPISODE_DURATION: 360 S
MDC_IDC_EPISODE_DURATION: 480 S
MDC_IDC_EPISODE_DURATION: 600 S
MDC_IDC_EPISODE_DURATION: 720 S
MDC_IDC_EPISODE_DURATION: 87 S
MDC_IDC_EPISODE_ID: 226
MDC_IDC_EPISODE_ID: 227
MDC_IDC_EPISODE_ID: 228
MDC_IDC_EPISODE_ID: 229
MDC_IDC_EPISODE_ID: 230
MDC_IDC_EPISODE_ID: 231
MDC_IDC_EPISODE_ID: 232
MDC_IDC_EPISODE_ID: 233
MDC_IDC_EPISODE_ID: 234
MDC_IDC_EPISODE_TYPE: NORMAL
MDC_IDC_LEAD_IMPLANT_DT: NORMAL
MDC_IDC_LEAD_LOCATION: NORMAL
MDC_IDC_LEAD_LOCATION_DETAIL_1: NORMAL
MDC_IDC_LEAD_MFG: NORMAL
MDC_IDC_LEAD_MODEL: NORMAL
MDC_IDC_LEAD_POLARITY_TYPE: NORMAL
MDC_IDC_LEAD_SERIAL: NORMAL
MDC_IDC_LEAD_SPECIAL_FUNCTION: NORMAL
MDC_IDC_MSMT_BATTERY_DTM: NORMAL
MDC_IDC_MSMT_BATTERY_REMAINING_LONGEVITY: 131 MO
MDC_IDC_MSMT_BATTERY_RRT_TRIGGER: 2.73
MDC_IDC_MSMT_BATTERY_STATUS: NORMAL
MDC_IDC_MSMT_BATTERY_VOLTAGE: 3.03 V
MDC_IDC_MSMT_CAP_CHARGE_DTM: NORMAL
MDC_IDC_MSMT_CAP_CHARGE_ENERGY: 18 J
MDC_IDC_MSMT_CAP_CHARGE_TIME: 3.76
MDC_IDC_MSMT_CAP_CHARGE_TYPE: NORMAL
MDC_IDC_MSMT_LEADCHNL_RV_IMPEDANCE_VALUE: 342 OHM
MDC_IDC_MSMT_LEADCHNL_RV_IMPEDANCE_VALUE: 399 OHM
MDC_IDC_MSMT_LEADCHNL_RV_PACING_THRESHOLD_AMPLITUDE: 0.5 V
MDC_IDC_MSMT_LEADCHNL_RV_PACING_THRESHOLD_PULSEWIDTH: 0.4 MS
MDC_IDC_MSMT_LEADCHNL_RV_SENSING_INTR_AMPL: 14.62 MV
MDC_IDC_MSMT_LEADCHNL_RV_SENSING_INTR_AMPL: 14.62 MV
MDC_IDC_PG_IMPLANT_DTM: NORMAL
MDC_IDC_PG_MFG: NORMAL
MDC_IDC_PG_MODEL: NORMAL
MDC_IDC_PG_SERIAL: NORMAL
MDC_IDC_PG_TYPE: NORMAL
MDC_IDC_SESS_CLINIC_NAME: NORMAL
MDC_IDC_SESS_DTM: NORMAL
MDC_IDC_SESS_TYPE: NORMAL
MDC_IDC_SET_BRADY_HYSTRATE: NORMAL
MDC_IDC_SET_BRADY_LOWRATE: 40 {BEATS}/MIN
MDC_IDC_SET_BRADY_MODE: NORMAL
MDC_IDC_SET_LEADCHNL_RV_PACING_AMPLITUDE: 2 V
MDC_IDC_SET_LEADCHNL_RV_PACING_ANODE_ELECTRODE_1: NORMAL
MDC_IDC_SET_LEADCHNL_RV_PACING_ANODE_LOCATION_1: NORMAL
MDC_IDC_SET_LEADCHNL_RV_PACING_CAPTURE_MODE: NORMAL
MDC_IDC_SET_LEADCHNL_RV_PACING_CATHODE_ELECTRODE_1: NORMAL
MDC_IDC_SET_LEADCHNL_RV_PACING_CATHODE_LOCATION_1: NORMAL
MDC_IDC_SET_LEADCHNL_RV_PACING_POLARITY: NORMAL
MDC_IDC_SET_LEADCHNL_RV_PACING_PULSEWIDTH: 0.4 MS
MDC_IDC_SET_LEADCHNL_RV_SENSING_ANODE_ELECTRODE_1: NORMAL
MDC_IDC_SET_LEADCHNL_RV_SENSING_ANODE_LOCATION_1: NORMAL
MDC_IDC_SET_LEADCHNL_RV_SENSING_CATHODE_ELECTRODE_1: NORMAL
MDC_IDC_SET_LEADCHNL_RV_SENSING_CATHODE_LOCATION_1: NORMAL
MDC_IDC_SET_LEADCHNL_RV_SENSING_POLARITY: NORMAL
MDC_IDC_SET_LEADCHNL_RV_SENSING_SENSITIVITY: 0.3 MV
MDC_IDC_SET_ZONE_DETECTION_BEATS_DENOMINATOR: 40 {BEATS}
MDC_IDC_SET_ZONE_DETECTION_BEATS_NUMERATOR: 30 {BEATS}
MDC_IDC_SET_ZONE_DETECTION_INTERVAL: 270 MS
MDC_IDC_SET_ZONE_DETECTION_INTERVAL: 460 MS
MDC_IDC_SET_ZONE_DETECTION_INTERVAL: 500 MS
MDC_IDC_SET_ZONE_DETECTION_INTERVAL: NORMAL
MDC_IDC_SET_ZONE_TYPE: NORMAL
MDC_IDC_STAT_AT_BURDEN_PERCENT: 0 %
MDC_IDC_STAT_AT_DTM_END: NORMAL
MDC_IDC_STAT_AT_DTM_START: NORMAL
MDC_IDC_STAT_BRADY_DTM_END: NORMAL
MDC_IDC_STAT_BRADY_DTM_START: NORMAL
MDC_IDC_STAT_BRADY_RV_PERCENT_PACED: 6.56 %
MDC_IDC_STAT_EPISODE_RECENT_COUNT: 0
MDC_IDC_STAT_EPISODE_RECENT_COUNT_DTM_END: NORMAL
MDC_IDC_STAT_EPISODE_RECENT_COUNT_DTM_START: NORMAL
MDC_IDC_STAT_EPISODE_TOTAL_COUNT: 0
MDC_IDC_STAT_EPISODE_TOTAL_COUNT: 2
MDC_IDC_STAT_EPISODE_TOTAL_COUNT: 232
MDC_IDC_STAT_EPISODE_TOTAL_COUNT_DTM_END: NORMAL
MDC_IDC_STAT_EPISODE_TOTAL_COUNT_DTM_START: NORMAL
MDC_IDC_STAT_EPISODE_TYPE: NORMAL
MDC_IDC_STAT_TACHYTHERAPY_ATP_DELIVERED_RECENT: 0
MDC_IDC_STAT_TACHYTHERAPY_ATP_DELIVERED_TOTAL: 0
MDC_IDC_STAT_TACHYTHERAPY_RECENT_DTM_END: NORMAL
MDC_IDC_STAT_TACHYTHERAPY_RECENT_DTM_START: NORMAL
MDC_IDC_STAT_TACHYTHERAPY_SHOCKS_ABORTED_RECENT: 0
MDC_IDC_STAT_TACHYTHERAPY_SHOCKS_ABORTED_TOTAL: 0
MDC_IDC_STAT_TACHYTHERAPY_SHOCKS_DELIVERED_RECENT: 0
MDC_IDC_STAT_TACHYTHERAPY_SHOCKS_DELIVERED_TOTAL: 0
MDC_IDC_STAT_TACHYTHERAPY_TOTAL_DTM_END: NORMAL
MDC_IDC_STAT_TACHYTHERAPY_TOTAL_DTM_START: NORMAL

## 2021-06-23 RX ORDER — WARFARIN SODIUM 4 MG/1
TABLET ORAL
Qty: 30 TABLET | Refills: 2 | Status: SHIPPED | OUTPATIENT
Start: 2021-06-23 | End: 2021-09-30

## 2021-06-24 RX ORDER — BUMETANIDE 1 MG/1
3 TABLET ORAL DAILY
Qty: 270 TABLET | Refills: 3 | Status: SHIPPED | OUTPATIENT
Start: 2021-06-24 | End: 2021-12-08

## 2021-06-25 ENCOUNTER — ANTICOAGULATION THERAPY VISIT (OUTPATIENT)
Dept: ANTICOAGULATION | Facility: CLINIC | Age: 66
End: 2021-06-25

## 2021-06-25 ENCOUNTER — CARE COORDINATION (OUTPATIENT)
Dept: CARDIOLOGY | Facility: CLINIC | Age: 66
End: 2021-06-25

## 2021-06-25 DIAGNOSIS — I50.22 CHRONIC SYSTOLIC CONGESTIVE HEART FAILURE (H): ICD-10-CM

## 2021-06-25 DIAGNOSIS — I48.0 PAROXYSMAL ATRIAL FIBRILLATION (H): ICD-10-CM

## 2021-06-25 DIAGNOSIS — Z95.811 LVAD (LEFT VENTRICULAR ASSIST DEVICE) PRESENT (H): ICD-10-CM

## 2021-06-25 LAB
INR PPP: 2.6 (ref 0.9–1.1)
LDH SERPL-CCNC: 301 U/L

## 2021-06-25 NOTE — PROGRESS NOTES
ANTICOAGULATION MANAGEMENT     Eliseo Tanner 65 year old male is on warfarin with therapeutic INR result. (Goal INR 2.0-3.0)    Recent labs: (last 7 days)     06/25/21   INR 2.6*       ASSESSMENT     Source(s): Chart Review and Patient/Caregiver Call       Warfarin doses taken: Warfarin taken as instructed    Diet: No new diet changes identified    New illness, injury, or hospitalization: No    Medication/supplement changes: None noted    Signs or symptoms of bleeding or clotting: No    Previous INR: Therapeutic last 2(+) visits    Additional findings: None     PLAN     Recommended plan for no diet, medication or health factor changes affecting INR     Dosing Instructions: Continue your current warfarin dose with next INR in 2 week       Summary  As of 6/25/2021    Full warfarin instructions:  4 mg every day   Next INR check:  7/9/2021             Telephone call with Eliseo/ Chapis whom agrees to plan and repeated back plan correctly    Patient using outside facility for labs    Education provided: None required    Plan made per [facility] anticoagulation protocol and per LVAD protocol    Young Bustos, RN  Anticoagulation Clinic  6/25/2021    _______________________________________________________________________     Anticoagulation Episode Summary     Current INR goal:  2.0-3.0   TTR:  89.8 % (10.6 mo)   Target end date:  Indefinite   Send INR reminders to:  [facility]    Indications    LVAD (left ventricular assist device) present (H) [Z95.811]  Chronic systolic congestive heart failure (H) [I50.22]  Paroxysmal atrial fibrillation (H) [I48.0]           Comments:  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  HM 3  placed 2/18/2020, 81mg ASA, Speak to spouse, Veronique at [phone]  4/14/2020:  Lab: [facility]:  ph: [phone] opt 5;   fax: [phone]         Anticoagulation Care Providers     Provider Role Specialty Phone number    Nader Cisneros  MD Referring Advanced Heart Failure and Transplant Cardiology [phone]

## 2021-07-05 NOTE — PROGRESS NOTES
D:  Pt zues follow up LDH level #2 of 3.  Level 301.  I:  Results to Laine BARRY NP.  No change to follow up.  Last level due July 30th.  A:  LDH  P:  Pt verbalized understanding of the instructions given.  Will call VAD coordinator with further needs and questions.

## 2021-07-07 DIAGNOSIS — I50.22 CHRONIC SYSTOLIC CONGESTIVE HEART FAILURE (H): ICD-10-CM

## 2021-07-09 ENCOUNTER — CARE COORDINATION (OUTPATIENT)
Dept: CARDIOLOGY | Facility: CLINIC | Age: 66
End: 2021-07-09

## 2021-07-09 ENCOUNTER — ANTICOAGULATION THERAPY VISIT (OUTPATIENT)
Dept: ANTICOAGULATION | Facility: CLINIC | Age: 66
End: 2021-07-09

## 2021-07-09 DIAGNOSIS — I50.22 CHRONIC SYSTOLIC CONGESTIVE HEART FAILURE (H): ICD-10-CM

## 2021-07-09 DIAGNOSIS — Z95.811 LVAD (LEFT VENTRICULAR ASSIST DEVICE) PRESENT (H): ICD-10-CM

## 2021-07-09 DIAGNOSIS — I48.0 PAROXYSMAL ATRIAL FIBRILLATION (H): ICD-10-CM

## 2021-07-09 LAB — INR PPP: 3.1 (ref 0.9–1.1)

## 2021-07-09 RX ORDER — NEPHROCAP 1 MG
CAP ORAL
Qty: 90 CAPSULE | Refills: 0 | Status: SHIPPED | OUTPATIENT
Start: 2021-07-09 | End: 2021-07-11

## 2021-07-09 NOTE — PROGRESS NOTES
Called pt to check in.  Pt feeling well.  Discussed request for Vitamin refill.  Sent script for 2 month supply and pt told have primary MD refill going forward.  Veronique informed me that pt had a bloody nose today.  It did not last more that 10 minutes and resolved with little intervention. Pt verbalized understanding of the instructions given.  Will call VAD coordinator with further needs and questions.

## 2021-07-09 NOTE — PROGRESS NOTES
ANTICOAGULATION MANAGEMENT     Eliseo Tanner 66 year old male is on warfarin with supratherapeutic INR result. (Goal INR 2.0-3.0)    Recent labs: (last 7 days)     07/09/21   INR 3.1*       ASSESSMENT     Source(s): Patient/Caregiver Call       Warfarin doses taken: Warfarin taken as instructed    Diet: No new diet changes identified    New illness, injury, or hospitalization: No    Medication/supplement changes: None noted    Signs or symptoms of bleeding or clotting: No    Previous INR: Therapeutic last 2(+) visits    Additional findings: None     PLAN     Recommended plan for no diet, medication or health factor changes affecting INR     Dosing Instructions: Continue your current warfarin dose with next INR in 2 weeks       Summary  As of 7/9/2021    Full warfarin instructions:  4 mg every day   Next INR check:  7/23/2021             Telephone call with  Patient's wife who verbalizes understanding and agrees to plan    Patient using outside facility for labs    Education provided: None required    Plan made per [facility] anticoagulation protocol and per LVAD protocol    Young Bustos, RN  Anticoagulation Clinic  7/9/2021    _______________________________________________________________________     Anticoagulation Episode Summary     Current INR goal:  2.0-3.0   TTR:  88.9 % (10.6 mo)   Target end date:  Indefinite   Send INR reminders to:  [facility]    Indications    LVAD (left ventricular assist device) present (H) [Z95.811]  Chronic systolic congestive heart failure (H) [I50.22]  Paroxysmal atrial fibrillation (H) [I48.0]           Comments:  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  HM 3  placed 2/18/2020, 81mg ASA, Speak to spouse, Veronique at [phone]  4/14/2020:  Lab: [facility]:  ph: [phone] opt 5;   fax: [phone]         Anticoagulation Care Providers     Provider Role Specialty Phone number    Nader Cisnreos MD Referring  Advanced Heart Failure and Transplant Cardiology [phone]

## 2021-07-11 DIAGNOSIS — I50.22 CHRONIC SYSTOLIC CONGESTIVE HEART FAILURE (H): ICD-10-CM

## 2021-07-11 RX ORDER — NEPHROCAP 1 MG
1 CAP ORAL DAILY
Qty: 100 CAPSULE | Refills: 3 | Status: SHIPPED | OUTPATIENT
Start: 2021-07-11 | End: 2022-05-16

## 2021-07-19 ENCOUNTER — TELEPHONE (OUTPATIENT)
Dept: INFECTIOUS DISEASES | Facility: CLINIC | Age: 66
End: 2021-07-19

## 2021-07-19 DIAGNOSIS — T82.7XXA INFECTION ASSOCIATED WITH DRIVELINE OF LEFT VENTRICULAR ASSIST DEVICE (LVAD) (H): Primary | ICD-10-CM

## 2021-07-19 NOTE — TELEPHONE ENCOUNTER
"[facility] Call Center    Phone Message    May a detailed message be left on voicemail: yes     Reason for Call: Medication Refill Request    Has the patient contacted the pharmacy for the refill? Yes   Name of medication being requested: cephALEXin (KEFLEX) 500 MG capsule    Provider who prescribed the medication: Dr Bhatti    Pharmacy: [id number] - [address]    Date medication is needed: pt also wanted to let provider know the \"drive line\" looks good.          Action Taken: Message routed to:  [facility] id    Travel Screening: Not Applicable                                                                      "

## 2021-07-20 RX ORDER — CEPHALEXIN 500 MG/1
500 CAPSULE ORAL 2 TIMES DAILY
Qty: 60 CAPSULE | Refills: 3 | Status: SHIPPED | OUTPATIENT
Start: 2021-07-20 | End: 2021-08-05

## 2021-07-23 ENCOUNTER — ANTICOAGULATION THERAPY VISIT (OUTPATIENT)
Dept: ANTICOAGULATION | Facility: CLINIC | Age: 66
End: 2021-07-23

## 2021-07-23 DIAGNOSIS — I48.0 PAROXYSMAL ATRIAL FIBRILLATION (H): ICD-10-CM

## 2021-07-23 DIAGNOSIS — I50.22 CHRONIC SYSTOLIC CONGESTIVE HEART FAILURE (H): ICD-10-CM

## 2021-07-23 DIAGNOSIS — Z95.811 LVAD (LEFT VENTRICULAR ASSIST DEVICE) PRESENT (H): Primary | ICD-10-CM

## 2021-07-23 LAB — INR (EXTERNAL): 2.5 (ref 0.9–1.1)

## 2021-07-23 NOTE — PROGRESS NOTES
ANTICOAGULATION MANAGEMENT     Eliseo Tanner 66 year old male is on warfarin with therapeutic INR result. (Goal INR 2.0-3.0)    Recent labs: (last 7 days)     07/23/21  0810   INR 2.5*       ASSESSMENT     Source(s): Patient/Caregiver Call       Warfarin doses taken: Warfarin taken as instructed    Diet: No new diet changes identified    New illness, injury, or hospitalization: No    Medication/supplement changes: he ran out of keflex and has been off x one week; he has new RX and will restart today    Signs or symptoms of bleeding or clotting: No    Previous INR: Supratherapeutic    Additional findings: None     PLAN     Recommended plan for no diet, medication or health factor changes affecting INR     Dosing Instructions: Continue your current warfarin dose with next INR in 2 weeks       Summary  As of 7/23/2021    Full warfarin instructions:  4 mg every day   Next INR check:  8/6/2021             Telephone call with  spouseVeronique who verbalizes understanding and agrees to plan    Patient using outside facility for labs    Education provided: Please call back if any changes to your diet, medications or how you've been taking warfarin and Contact [phone] with any changes, questions or concerns.     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  HM 3  placed 2/18/2020, 81mg ASA, Speak to Veronique ramírez at [phone]  4/14/2020:   Lab: [facility]:  ph: [phone] opt 5;   fax: [phone]         Anticoagulation Care Providers     Provider Role Specialty Phone number    Nader Cisneros MD Referring Advanced Heart Failure and Transplant Cardiology [phone]

## 2021-08-04 NOTE — PROGRESS NOTES
Eliseo is a 65 year old who is being evaluated via a billable telephone visit.      What phone number would you like to be contacted at? [phone]  How would you like to obtain your AVS? Mail a copy  Phone call duration: 23 minutes  Gianna GAY CMA    [facility]  Infectious Disease Outpatient Follow up Note     Patient:  Eliseo Tanner, Date of birth 1955, Medical record number [id number]  Date of Visit:  08/04/2021       Problem List:  1. Chronic MSSA LVAD infection, on cephalexin suppression  2. Severe Legionella pneumonia serogroup 1: Urine antigen positive, sputum PCR positive plus pulmonary opacities.  S/p 10 days of azithromycin (completed 2/25)  3. CKD (crcl ~43)  4. 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 History of NICM s/p HM III (2/18/20), ICD placement (3/16/20)  6. Received 1st dose of the Moderna covid 19 vaccine on 2/11, no plans to get 2nd vaccine    Assessment:  66 y/o M w/ NICM s/p HM III and ICD placement, chronic MSSA driveline infection c/b MSSA bacteremia 11/2020 on cephalexin suppression who was recently hospitalized from 2/15-3/17 for severe Legionella pneumonia s/p 10 days of azithromycin.  Hospitalization was complicated by cardiogenic shock, oliguric renal failure requiring CRRT and respiratory decompensation requiring intubation. 2/15 CT C/A/P revealed that there was trace fluid around the LVAD with some mild surrounding inflammation within the subq tissue fat and superior right rectus abdominal muscle.  In attempt to reduce the bacterial burden cephalexin was changed to IV cefazolin and he received cefazolin for about 2 weeks.  After this he was transitioned to 4 times a day cephalexin.Now he is on twice a day suppression.     Recommendations:  1. Continue cephalexin 500 mg twice a day for suppression  2. Plan for follow-up in 3 months  ** minutes spent on the date of the encounter doing chart review, review of  outside records, review of test results, interpretation of tests, patient visit and documentation     Negar Bhatti DO.   Infectious Diseases  Pager [phone]        Interval History:   Last seen by me on 4/8. We taped down from treatment doses of cephalexin to suppressive doses. Currently on cephalexin 500 mg twice a day. Tolerating antibiotic okay.   Color brownish-smaller than an erasier. Changes dressing every day. No erythema around the exit site. No fever or chills.   No new     4/8/21 HPI:  Eliseo was last seen by infectious diseases during his last hospitalization (2/15-3/17).  He was last seen on 2/23/21.  He finished treatment for severe Legionella pneumonia.  He also received cefazolin from 2/15-3/1 for acute on chronic MSSA driveline infection.  He is currently on cephalexin 500 mg 4 times a day.  The wife is the primary person who takes care of his LVAD dressing changes.  She thinks that the driveline site looks much better than prior to his hospitalization.  The surrounding area is without erythema.  There is minimal drainage present that can be yellowish in color but overall there is scant drainage.  They have not had any issues with the VAD line positioning.  Denies subjective fevers, chills, nausea, vomiting, diarrhea.  He is tolerating the antibiotic without issue.  Most recent creatinine on 3/26 was 1.83.  White blood cell count is normal.     Review of Systems:Remaining systems all reviewed and negative.           Medications & Allergies:     Current Outpatient Medications   Medication     allopurinol (ZYLOPRIM) 100 MG tablet     amiodarone (PACERONE) 200 MG tablet     amLODIPine (NORVASC) 5 MG tablet     aspirin (ASA) 81 MG EC tablet     atorvastatin (LIPITOR) 20 MG tablet     B Complex-C-Folic Acid (VIRT-CAPS) 1 MG CAPS     bumetanide (BUMEX) 1 MG tablet     cephALEXin (KEFLEX) 500 MG capsule     co-enzyme Q-10 200 MG CAPS     finasteride (PROSCAR) 5 MG tablet     FLUoxetine (PROZAC) 10 MG  capsule     hydrALAZINE (APRESOLINE) 50 MG tablet     insulin glargine (BASAGLAR KWIKPEN) 100 UNIT/ML pen     insulin pen needle (BD JAIME U/F) 32G X 4 MM miscellaneous     magnesium oxide (MAG-OX) 400 MG tablet     nitroGLYcerin (NITROSTAT) 0.4 MG sublingual tablet     omeprazole (PRILOSEC) 20 MG DR capsule     potassium chloride ER (KLOR-CON M) 20 MEQ CR tablet     senna-docusate (SENOKOT-S/PERICOLACE) 8.6-50 MG tablet     tamsulosin (FLOMAX) 0.4 MG capsule     warfarin ANTICOAGULANT (COUMADIN) 2 MG tablet     warfarin ANTICOAGULANT (COUMADIN) 4 MG tablet     zolpidem (AMBIEN) 5 MG tablet     No current facility-administered medications for this visit.         Allergies   Allergen Reactions     Heparin      HIT screen positive 2/14/20, reflex DAVINA negative; however heme recommended treating as if positive  HIT screen negative 2/11/20     Oxycodone Itching and Other (See Comments)     Chlorhexidine Rash            Physical Exam:     BP: ()/()   Arterial Line BP: ()/()  Telephone visit:  GENERAL: alert and no distress  RESP: No audible wheeze, cough, or visible cyanosis.    NEURO:  Mentation and speech appropriate for age.  PSYCH:normal speech          Laboratory Data:   Metabolic Studies       Recent Labs   Lab Test 04/09/21  0000 03/19/21  0000 03/17/21  0613 02/24/21  1855 02/24/21  0442 02/23/21  2130 02/22/21  2140 02/22/21  1539 02/15/21  2127 02/15/21  1910 01/06/21  0539 02/13/20  0219 02/13/20  0206    138 137  --  132*  --    < >  --    < > 129* 135   < > 132*   POTASSIUM 4.2 4.0 3.9  --  4.1  --    < >  --    < > 5.1 4.3   < > 4.1  4.2   CHLORIDE 103 98.0 102  --  99  --    < >  --    < > 97 102   < > 96   CO2 28 32.0 29  --  25  --    < >  --    < > 16* 23   < > 22   ANIONGAP 8 8 6  --  8  --    < >  --    < > 17* 10   < > 14   BUN 35 59 69*  --  44*  --    < >  --    < > 78* 105*   < > 34*   CR 1.6 2.1 2.00*  --  3.84*  --    < >  --    < > 3.23* 2.85*   < > 1.58*   GFRESTIMATED  --  34 33*  --   15*  --    < >  --    < > 19* 22*   < > 45*   * 118* 119*  --  125*  --    < >  --    < > 215* 145*   < > 154*   A1C  --   --   --   --   --   --   --   --   --   --  6.8*  --   --    CALOS 9.0 9.1 8.3*  --  8.0*  --    < >  --    < > 8.7 8.7   < > 8.9   PHOS  --   --   --   --   --   --   --  5.2*   < > 5.0*  --    < > 5.4*   MAG  --   --  1.5*   < >  --   --   --  2.6*   < > 2.1 2.3   < > 2.0   LACT  --   --   --   --  0.7 1.4  --  0.9  --  6.9*  --   --  9.5*   PCAL  --   --   --   --   --   --   --   --   --  7.95*  --   --   --    CKT  --   --   --   --   --   --   --   --   --  884*  --   --  39    < > = values in this interval not displayed.       Hepatic Studies    Recent Labs   Lab Test 06/25/21  0000 05/28/21  0000 03/19/21  0000 03/15/21  0551 03/14/21  1720 03/14/21  0459 11/15/20  0830 11/14/20  2041   BILITOTAL  --   --  0.5 0.4 0.4  --    < > 0.7   DBIL  --   --   --   --   --   --   --  0.4*   ALKPHOS  --   --  138.0 121 128  --    < > 184*   PROTTOTAL  --   --  8.8 7.7 8.2   < >   < > 7.8   ALBUMIN  --   --  4.4 2.9* 3.3*  --    < > 2.6*   AST  --   --  60.0 39 42  --    < > 48*   ALT  --   --  35 29 32  --    < > 56    314* 336 283*  --   --   --   --     < > = values in this interval not displayed.       Hematology Studies      Recent Labs   Lab Test 03/19/21  0000 03/17/21  0613 03/16/21  0551 03/15/21  0551 03/14/21  0459 03/13/21  0440 03/07/21  0543 03/06/21  0430 11/05/20  0000   WBC 7.9 6.6 6.5 6.3 5.9 5.8 4.5 4.4  --    ANEU  --   --   --   --   --   --  2.6 2.8  --    ALYM  --   --   --   --   --   --  0.9 0.7*  --    GIULIANO  --   --   --   --   --   --  0.7 0.6  --    AEOS  --   --   --   --   --   --  0.3 0.2  --    HGB 10.1* 9.0* 8.9* 8.7* 8.5* 8.4* 7.9* 8.0*   < >   HCT 32.2 29.0* 29.0* 28.8* 28.5* 27.4* 25.9* 25.3*   < >    268 266 275 259 257 208 196  --     < > = values in this interval not displayed.       Inflammatory Markers    Recent Labs   Lab Test  02/16/21 2219 02/15/21  1910 02/11/21  0838 12/17/20  0756 12/14/20  0815 11/05/20  0000   SED 72* 47*  --   --   --   --    .0* 270.0* 8.6 8.0 6.1 54.9     Microbiology:  Last Culture results with specimen source  Culture Micro   Date Value Ref Range Status   02/17/2021 No growth  Final   02/17/2021 No growth  Final   02/16/2021 Light growth  Enterococcus faecium   (A)  Final   02/16/2021 (A)  Final    Legionella pneumophila  isolated  Sent to [facility] for serotyping     02/16/2021   Final    Critical Value/Significant Value, preliminary result only, called to and read back by  Shobha Pedro RN on 2.23.21 at 1401. bw     02/16/2021 (A)  Final    Report received from the [place] Dept. of Health:  Legionella pneumophila serogroup 1  This test was developed and its performance characteristics determined by the [facility]   [facility].  It has not been cleared or approved by the U.S. Food and Drug   Administration:21CFR 809.30(e).  The FDA has determined that such clearance is not   necessary.     02/15/2021 Moderate growth  Staphylococcus aureus   (A)  Final   02/15/2021 Moderate growth  Strain 2  Staphylococcus aureus   (A)  Final   02/15/2021 Moderate growth  Strain 3  Staphylococcus aureus   (A)  Final   02/15/2021 Light growth  Normal skin freeman    Final   02/15/2021 (A)  Final    Canceled, Test credited  >10 Squamous epithelial cells/low power field indicates oral contamination. Please   recollect.  Notification of test cancellation was given to  Bethanie Murillo RN on 2.15.21 at 2326. JRT     02/15/2021 No growth  Final   02/15/2021 No growth  Final   02/08/2021 No growth  Final   02/08/2021 No growth  Final   02/08/2021 Moderate growth  Staphylococcus aureus   (A)  Final   02/08/2021 Moderate growth  Strain 2  Staphylococcus aureus   (A)  Final   02/08/2021 No anaerobes isolated  Final    Specimen Description   Date Value Ref Range Status   02/17/2021 Blood Right Hand  Final   02/17/2021 Blood Left Arm   Final   02/16/2021 Nares  Final   02/16/2021 Sputum Endotracheal  Final   02/16/2021 Sputum Endotracheal  Final   02/16/2021 Urine  Final   02/16/2021 Urine  Final   02/15/2021 Other LINE  Final   02/15/2021 Other LINE  Final   02/15/2021 Sputum  Final   02/15/2021 Sputum  Final   02/15/2021 Blood Right Hand  Final   02/15/2021 Blood Right Hand  Final   02/08/2021 Blood Right Hand  Final   02/08/2021 Blood Left Hand  Final   02/08/2021 Other DRLINE EXIT SITE  Final   02/08/2021 Other DRLINE EXIT SITE  Final   02/08/2021 Other DRLINE EXIT SITE  Final        Last check of C difficile  C Diff Toxin B PCR   Date Value Ref Range Status   11/15/2020 Negative NEG^Negative Final     Comment:     Negative: C. difficile target DNA sequences NOT detected, presumed negative   for C.difficile toxin B or the number of bacteria present may be below the   limit of detection for the test.  FDA approved assay performed using [employer] GeneXpert real-time PCR.  A negative result does not exclude actual disease due to C. difficile and may   be due to improper collection, handling and storage of the specimen or the   number of organisms in the specimen is below the detection limit of the assay.         Urine Studies     Recent Labs   Lab Test 02/16/21  0225 11/17/20  0825 02/22/20  0944 02/13/20  0334 02/07/20 2029   URINEPH 5.5 5.5 5.5 6.0 5.0   NITRITE Negative Negative Negative Negative Negative   LEUKEST Negative Negative Small* Small* Negative   WBCU 0 0 2 81* 3     Imaging:  No results found for this or any previous visit (from the past 48 hour(s)).

## 2021-08-05 ENCOUNTER — VIRTUAL VISIT (OUTPATIENT)
Dept: INFECTIOUS DISEASES | Facility: CLINIC | Age: 66
End: 2021-08-05
Attending: INTERNAL MEDICINE
Payer: MEDICARE

## 2021-08-05 DIAGNOSIS — Z79.2 CHRONIC ANTIBIOTIC SUPPRESSION: Primary | ICD-10-CM

## 2021-08-05 DIAGNOSIS — T82.7XXA INFECTION ASSOCIATED WITH DRIVELINE OF LEFT VENTRICULAR ASSIST DEVICE (LVAD) (H): ICD-10-CM

## 2021-08-05 PROCEDURE — 99214 OFFICE O/P EST MOD 30 MIN: CPT | Mod: 95 | Performed by: INTERNAL MEDICINE

## 2021-08-05 RX ORDER — CEPHALEXIN 500 MG/1
500 CAPSULE ORAL 2 TIMES DAILY
Qty: 60 CAPSULE | Refills: 6 | Status: SHIPPED | OUTPATIENT
Start: 2021-08-05 | End: 2021-08-20 | Stop reason: ALTCHOICE

## 2021-08-05 ASSESSMENT — PAIN SCALES - GENERAL: PAINLEVEL: NO PAIN (0)

## 2021-08-05 NOTE — PROGRESS NOTES
Eliseo is a 66 year old who is being evaluated via a billable video visit.    Cary text the link to his phone  How would you like to obtain your AVS? MyChart  If the video visit is dropped, the invitation should be resent by: Send to e-mail at: [email or web address]  Will anyone else be joining your video visit? No      Video-Visit Details  Type of service:  Video Visit  Video Start Time: 9:00 AM  Video End Time:9:15 am  Originating Location (pt. Location): Home  Distant Location (provider location):  [facility] [place]   Platform used for Video Visit: [facility]  Infectious Disease Outpatient Follow up Note     Patient:  Eliseo Tanner, Date of birth 1955   Medical record number [id number]  Date of Visit:  08/04/2021       Problem List:  1. Chronic MSSA LVAD infection, on cephalexin suppression  2. History of severe Legionella pneumonia serogroup 1: Urine antigen positive, sputum PCR positive plus pulmonary opacities.  s/p 10 days of azithromycin (completed 2/25)  3. CKD (crcl ~43)  4. 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 History of NICM s/p HM III (2/18/20), ICD placement (3/16/20)  6. Received 1st dose of the Moderna covid 19 vaccine on 2/11    Assessment:  64 y/o M w/ NICM s/p HM III and ICD placement, chronic MSSA driveline infection c/b MSSA bacteremia 11/2020 on cephalexin suppression;  severe Legionella pneumonia s/p 10 days of azithromycin (Feb 2021) c/b cardiogenic shock, oliguric renal failure requiring CRRT and respiratory decompensation requiring intubation. Today's follow up was to focus on his tolerance of the cephalexin suppression. He is doing well on BID dosing w/ no side effects. Continues to have very small amounts of brown drainage but no other active signs of infection. Will continue suppression w/ surveillance labs.  We discussed the COVID 19 vaccination and the  receipt of the second dose. His Feb hospitalization was likely complicated due to severe Legionella pneumonia and not receipt of the COVID vaccination. He is at risk for a severe COVID 19 infection especially as the delta variant is circulating. A second dose will provide greater protection to the delta variant.  I recommend that he receive the second dose but I will discuss with cardiology since he believes he was told not to receive it.      Recommendations:  1. Continue cephalexin 500 mg twice a day for suppression-refills provided   2. Recommend 2nd COVID 19 vaccination-will discuss with Dr. Cisneros  3. Obtain CBC and CMP when he is here on 8/17/21  4. Follow up 6 months    30 minutes spent on the date of the encounter doing chart review, patient visit, documentation and discussion with another provider     Negar Bhatti DO.   Infectious Diseases  Pager [phone]        Interval History:   Last seen by me on 4/8. We taperedd down from treatment doses of cephalexin to suppressive doses. Currently on cephalexin 500 mg twice a day. Tolerating antibiotic okay. No issues with nausea, vomiting, diarrhea. Continues to have a small amount of brownish drainage with daily dressing changes (smaller then the size of an eraser). No erythema around the exit site. No subjective fever or chills.     Received the COVID 19 vaccination x 1 (moderna). Has not received a second dose because he thinks he was told my cardiology not to receive it. His wife and other family members are vaccinated. Grand daughter will be vaccinated this week. We discussed the risks and benefits of vaccination.      ID History:  Chronic MSSA driveline infection c/b MSSA bacteremia 11/2020 on cephalexin suppression. After ebing started on suppression he developed acute on chronic infection and received cefazolin from 2/15-3/1 for acute on chronic MSSA driveline infection. 2/15 CT C/A/P revealed that there was trace fluid around the LVAD with some mild  surrounding inflammation within the subq tissue fat and superior right rectus abdominal muscle.  In attempt to reduce the bacterial burden cephalexin was changed to IV cefazolin and he received cefazolin for about 2 weeks.  After this he was transitioned to 4 times a day cephalexin.Now he is on cephalexin 500 mg BID.    Hospitalized 2/15-3/17/21 for Legionella pneumonia.  Developed cardiogenic shock, oliguric renal failure requiring CRRT and respiratory decompensation requiring intubation.       Review of Systems:Remaining systems all reviewed and negative.           Medications & Allergies:     Current Outpatient Medications   Medication     allopurinol (ZYLOPRIM) 100 MG tablet     amiodarone (PACERONE) 200 MG tablet     amLODIPine (NORVASC) 5 MG tablet     aspirin (ASA) 81 MG EC tablet     atorvastatin (LIPITOR) 20 MG tablet     B Complex-C-Folic Acid (VIRT-CAPS) 1 MG CAPS     bumetanide (BUMEX) 1 MG tablet     cephALEXin (KEFLEX) 500 MG capsule     co-enzyme Q-10 200 MG CAPS     finasteride (PROSCAR) 5 MG tablet     FLUoxetine (PROZAC) 10 MG capsule     hydrALAZINE (APRESOLINE) 50 MG tablet     insulin glargine (BASAGLAR KWIKPEN) 100 UNIT/ML pen     insulin pen needle (BD JAIME U/F) 32G X 4 MM miscellaneous     magnesium oxide (MAG-OX) 400 MG tablet     nitroGLYcerin (NITROSTAT) 0.4 MG sublingual tablet     omeprazole (PRILOSEC) 20 MG DR capsule     potassium chloride ER (KLOR-CON M) 20 MEQ CR tablet     senna-docusate (SENOKOT-S/PERICOLACE) 8.6-50 MG tablet     tamsulosin (FLOMAX) 0.4 MG capsule     warfarin ANTICOAGULANT (COUMADIN) 2 MG tablet     warfarin ANTICOAGULANT (COUMADIN) 4 MG tablet     zolpidem (AMBIEN) 5 MG tablet     No current facility-administered medications for this visit.         Allergies   Allergen Reactions     Heparin      HIT screen positive 2/14/20, reflex DAVINA negative; however heme recommended treating as if positive  HIT screen negative 2/11/20     Oxycodone Itching and Other (See  Comments)     Chlorhexidine Rash            Physical Exam:     GENERAL: alert and no distress  EYES: Eyes grossly normal to inspection.  No obvious scleral/conjunctival abnormalities.  RESP: No audible wheeze, cough, or visible cyanosis.  No visible retractions or increased work of breathing.    SKIN: Visible skin clear.   NEURO:   Mentation and speech appropriate for age.  PSYCH:  affect normal, judgement and insight intact, normal speech         Laboratory Data:   Metabolic Studies       Recent Labs   Lab Test 04/09/21  0000 03/19/21  0000 03/17/21  0613 02/24/21  1855 02/24/21  0442 02/23/21  2130 02/22/21  2140 02/22/21  1539 02/15/21  2127 02/15/21  1910 01/06/21  0539 02/13/20  0219 02/13/20  0206    138 137  --  132*  --    < >  --    < > 129* 135   < > 132*   POTASSIUM 4.2 4.0 3.9  --  4.1  --    < >  --    < > 5.1 4.3   < > 4.1  4.2   CHLORIDE 103 98.0 102  --  99  --    < >  --    < > 97 102   < > 96   CO2 28 32.0 29  --  25  --    < >  --    < > 16* 23   < > 22   ANIONGAP 8 8 6  --  8  --    < >  --    < > 17* 10   < > 14   BUN 35 59 69*  --  44*  --    < >  --    < > 78* 105*   < > 34*   CR 1.6 2.1 2.00*  --  3.84*  --    < >  --    < > 3.23* 2.85*   < > 1.58*   GFRESTIMATED  --  34 33*  --  15*  --    < >  --    < > 19* 22*   < > 45*   * 118* 119*  --  125*  --    < >  --    < > 215* 145*   < > 154*   A1C  --   --   --   --   --   --   --   --   --   --  6.8*  --   --    CALOS 9.0 9.1 8.3*  --  8.0*  --    < >  --    < > 8.7 8.7   < > 8.9   PHOS  --   --   --   --   --   --   --  5.2*   < > 5.0*  --    < > 5.4*   MAG  --   --  1.5*   < >  --   --   --  2.6*   < > 2.1 2.3   < > 2.0   LACT  --   --   --   --  0.7 1.4  --  0.9  --  6.9*  --   --  9.5*   PCAL  --   --   --   --   --   --   --   --   --  7.95*  --   --   --    CKT  --   --   --   --   --   --   --   --   --  884*  --   --  39    < > = values in this interval not displayed.       Hepatic Studies    Recent Labs   Lab Test  06/25/21  0000 05/28/21  0000 03/19/21  0000 03/15/21  0551 03/14/21  1720 03/14/21  0459 11/15/20  0830 11/14/20  2041   BILITOTAL  --   --  0.5 0.4 0.4  --    < > 0.7   DBIL  --   --   --   --   --   --   --  0.4*   ALKPHOS  --   --  138.0 121 128  --    < > 184*   PROTTOTAL  --   --  8.8 7.7 8.2   < >   < > 7.8   ALBUMIN  --   --  4.4 2.9* 3.3*  --    < > 2.6*   AST  --   --  60.0 39 42  --    < > 48*   ALT  --   --  35 29 32  --    < > 56    314* 336 283*  --   --   --   --     < > = values in this interval not displayed.       Hematology Studies      Recent Labs   Lab Test 03/19/21  0000 03/17/21  0613 03/16/21  0551 03/15/21  0551 03/14/21  0459 03/13/21  0440 03/07/21  0543 03/06/21  0430 11/05/20  0000   WBC 7.9 6.6 6.5 6.3 5.9 5.8 4.5 4.4  --    ANEU  --   --   --   --   --   --  2.6 2.8  --    ALYM  --   --   --   --   --   --  0.9 0.7*  --    GIULIANO  --   --   --   --   --   --  0.7 0.6  --    AEOS  --   --   --   --   --   --  0.3 0.2  --    HGB 10.1* 9.0* 8.9* 8.7* 8.5* 8.4* 7.9* 8.0*   < >   HCT 32.2 29.0* 29.0* 28.8* 28.5* 27.4* 25.9* 25.3*   < >    268 266 275 259 257 208 196  --     < > = values in this interval not displayed.       Inflammatory Markers    Recent Labs   Lab Test 02/16/21  2219 02/15/21  1910 02/11/21  0838 12/17/20  0756 12/14/20  0815 11/05/20  0000   SED 72* 47*  --   --   --   --    .0* 270.0* 8.6 8.0 6.1 54.9     Microbiology:  Last Culture results with specimen source  Culture Micro   Date Value Ref Range Status   02/17/2021 No growth  Final   02/17/2021 No growth  Final   02/16/2021 Light growth  Enterococcus faecium   (A)  Final   02/16/2021 (A)  Final    Legionella pneumophila  isolated  Sent to [facility] serotyping     02/16/2021   Final    Critical Value/Significant Value, preliminary result only, called to and read back by  Shobha Pedro RN on 2.23.21 at 1401. bw     02/16/2021 (A)  Final    Report received from the [place] Dept. of Health:  Legionella  pneumophila serogroup 1  This test was developed and its performance characteristics determined by the [facility].  It has not been cleared or approved by the U.S. Food and Drug   Administration:21CFR 809.30(e).  The FDA has determined that such clearance is not   necessary.     02/15/2021 Moderate growth  Staphylococcus aureus   (A)  Final   02/15/2021 Moderate growth  Strain 2  Staphylococcus aureus   (A)  Final   02/15/2021 Moderate growth  Strain 3  Staphylococcus aureus   (A)  Final   02/15/2021 Light growth  Normal skin freeman    Final   02/15/2021 (A)  Final    Canceled, Test credited  >10 Squamous epithelial cells/low power field indicates oral contamination. Please   recollect.  Notification of test cancellation was given to  Bethanie Murillo RN on 2.15.21 at 2326. JRT     02/15/2021 No growth  Final   02/15/2021 No growth  Final   02/08/2021 No growth  Final   02/08/2021 No growth  Final   02/08/2021 Moderate growth  Staphylococcus aureus   (A)  Final   02/08/2021 Moderate growth  Strain 2  Staphylococcus aureus   (A)  Final   02/08/2021 No anaerobes isolated  Final        Last check of C difficile  C Diff Toxin B PCR   Date Value Ref Range Status   11/15/2020 Negative NEG^Negative Final     Comment:     Negative: C. difficile target DNA sequences NOT detected, presumed negative   for C.difficile toxin B or the number of bacteria present may be below the   limit of detection for the test.  FDA approved assay performed using [employer] GeneXpert real-time PCR.  A negative result does not exclude actual disease due to C. difficile and may   be due to improper collection, handling and storage of the specimen or the   number of organisms in the specimen is below the detection limit of the assay.       Urine Studies     Recent Labs   Lab Test 02/16/21  0225 11/17/200825 02/22/20  0944 02/13/20  0334 02/07/20 2029   URINEPH 5.5 5.5 5.5 6.0 5.0   NITRITE Negative Negative Negative Negative Negative    LEUKEST Negative Negative Small* Small* Negative   WBCU 0 0 2 81* 3     Imaging:  No recent imaging

## 2021-08-05 NOTE — LETTER
8/5/2021       RE: Eliseo Tanner  [address]  [place] [address]     Dear Colleague,    Thank you for referring your patient, Eliseo Tanner, to the [facility] INFECTIOUS DISEASE CLINIC [place] [facility]. Please see a copy of my visit note below.    Eliseo is a 66 year old who is being evaluated via a billable video visit.    Doximity text the link to his phone  How would you like to obtain your AVS? MyChart  If the video visit is dropped, the invitation should be resent by: Send to e-mail at: [email or web address]  Will anyone else be joining your video visit? No      Video-Visit Details  Type of service:  Video Visit  Video Start Time: 9:00 AM  Video End Time:9:15 am  Originating Location (pt. Location): Home  Distant Location (provider location):  [facility] INFECTIOUS DISEASE [facility]   Platform used for Video Visit: [facility]  Infectious Disease Outpatient Follow up Note     Patient:  Eliseo Tanner, Date of birth 1955   Medical record number [id number]  Date of Visit:  08/04/2021       Problem List:  1. Chronic MSSA LVAD infection, on cephalexin suppression  2. History of severe Legionella pneumonia serogroup 1: Urine antigen positive, sputum PCR positive plus pulmonary opacities.  s/p 10 days of azithromycin (completed 2/25)  3. CKD (crcl ~43)  4. 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 History of NICM s/p HM III (2/18/20), ICD placement (3/16/20)  6. Received 1st dose of the Moderna covid 19 vaccine on 2/11    Assessment:  64 y/o M w/ NICM s/p HM III and ICD placement, chronic MSSA driveline infection c/b MSSA bacteremia 11/2020 on cephalexin suppression;  severe Legionella pneumonia s/p 10 days of azithromycin (Feb 2021) c/b cardiogenic shock, oliguric renal failure requiring CRRT and respiratory decompensation requiring  intubation. Today's follow up was to focus on his tolerance of the cephalexin suppression. He is doing well on BID dosing w/ no side effects. Continues to have very small amounts of brown drainage but no other active signs of infection. Will continue suppression w/ surveillance labs.  We discussed the COVID 19 vaccination and the receipt of the second dose. His Feb hospitalization was likely complicated due to severe Legionella pneumonia and not receipt of the COVID vaccination. He is at risk for a severe COVID 19 infection especially as the delta variant is circulating. A second dose will provide greater protection to the delta variant.  I recommend that he receive the second dose but I will discuss with cardiology since he believes he was told not to receive it.      Recommendations:  1. Continue cephalexin 500 mg twice a day for suppression-refills provided   2. Recommend 2nd COVID 19 vaccination-will discuss with Dr. Cisneros  3. Obtain CBC and CMP when he is here on 8/17/21  4. Follow up 6 months    30 minutes spent on the date of the encounter doing chart review, patient visit, documentation and discussion with another provider     Negar Bhatti DO.   Infectious Diseases  Pager [phone]        Interval History:   Last seen by me on 4/8. We taperedd down from treatment doses of cephalexin to suppressive doses. Currently on cephalexin 500 mg twice a day. Tolerating antibiotic okay. No issues with nausea, vomiting, diarrhea. Continues to have a small amount of brownish drainage with daily dressing changes (smaller then the size of an eraser). No erythema around the exit site. No subjective fever or chills.     Received the COVID 19 vaccination x 1 (moderna). Has not received a second dose because he thinks he was told my cardiology not to receive it. His wife and other family members are vaccinated. Grand daughter will be vaccinated this week. We discussed the risks and benefits of vaccination.      ID  History:  Chronic MSSA driveline infection c/b MSSA bacteremia 11/2020 on cephalexin suppression. After ebing started on suppression he developed acute on chronic infection and received cefazolin from 2/15-3/1 for acute on chronic MSSA driveline infection. 2/15 CT C/A/P revealed that there was trace fluid around the LVAD with some mild surrounding inflammation within the subq tissue fat and superior right rectus abdominal muscle.  In attempt to reduce the bacterial burden cephalexin was changed to IV cefazolin and he received cefazolin for about 2 weeks.  After this he was transitioned to 4 times a day cephalexin.Now he is on cephalexin 500 mg BID.    Hospitalized 2/15-3/17/21 for Legionella pneumonia.  Developed cardiogenic shock, oliguric renal failure requiring CRRT and respiratory decompensation requiring intubation.       Review of Systems:Remaining systems all reviewed and negative.           Medications & Allergies:     Current Outpatient Medications   Medication     allopurinol (ZYLOPRIM) 100 MG tablet     amiodarone (PACERONE) 200 MG tablet     amLODIPine (NORVASC) 5 MG tablet     aspirin (ASA) 81 MG EC tablet     atorvastatin (LIPITOR) 20 MG tablet     B Complex-C-Folic Acid (VIRT-CAPS) 1 MG CAPS     bumetanide (BUMEX) 1 MG tablet     cephALEXin (KEFLEX) 500 MG capsule     co-enzyme Q-10 200 MG CAPS     finasteride (PROSCAR) 5 MG tablet     FLUoxetine (PROZAC) 10 MG capsule     hydrALAZINE (APRESOLINE) 50 MG tablet     insulin glargine (BASAGLAR KWIKPEN) 100 UNIT/ML pen     insulin pen needle (BD JAIME U/F) 32G X 4 MM miscellaneous     magnesium oxide (MAG-OX) 400 MG tablet     nitroGLYcerin (NITROSTAT) 0.4 MG sublingual tablet     omeprazole (PRILOSEC) 20 MG DR capsule     potassium chloride ER (KLOR-CON M) 20 MEQ CR tablet     senna-docusate (SENOKOT-S/PERICOLACE) 8.6-50 MG tablet     tamsulosin (FLOMAX) 0.4 MG capsule     warfarin ANTICOAGULANT (COUMADIN) 2 MG tablet     warfarin ANTICOAGULANT (COUMADIN)  4 MG tablet     zolpidem (AMBIEN) 5 MG tablet     No current facility-administered medications for this visit.         Allergies   Allergen Reactions     Heparin      HIT screen positive 2/14/20, reflex DAVINA negative; however heme recommended treating as if positive  HIT screen negative 2/11/20     Oxycodone Itching and Other (See Comments)     Chlorhexidine Rash            Physical Exam:     GENERAL: alert and no distress  EYES: Eyes grossly normal to inspection.  No obvious scleral/conjunctival abnormalities.  RESP: No audible wheeze, cough, or visible cyanosis.  No visible retractions or increased work of breathing.    SKIN: Visible skin clear.   NEURO:   Mentation and speech appropriate for age.  PSYCH:  affect normal, judgement and insight intact, normal speech         Laboratory Data:   Metabolic Studies       Recent Labs   Lab Test 04/09/21  0000 03/19/21  0000 03/17/21  0613 02/24/21  1855 02/24/21  0442 02/23/21  2130 02/22/21  2140 02/22/21  1539 02/15/21  2127 02/15/21  1910 01/06/21  0539 02/13/20  0219 02/13/20  0206    138 137  --  132*  --    < >  --    < > 129* 135   < > 132*   POTASSIUM 4.2 4.0 3.9  --  4.1  --    < >  --    < > 5.1 4.3   < > 4.1  4.2   CHLORIDE 103 98.0 102  --  99  --    < >  --    < > 97 102   < > 96   CO2 28 32.0 29  --  25  --    < >  --    < > 16* 23   < > 22   ANIONGAP 8 8 6  --  8  --    < >  --    < > 17* 10   < > 14   BUN 35 59 69*  --  44*  --    < >  --    < > 78* 105*   < > 34*   CR 1.6 2.1 2.00*  --  3.84*  --    < >  --    < > 3.23* 2.85*   < > 1.58*   GFRESTIMATED  --  34 33*  --  15*  --    < >  --    < > 19* 22*   < > 45*   * 118* 119*  --  125*  --    < >  --    < > 215* 145*   < > 154*   A1C  --   --   --   --   --   --   --   --   --   --  6.8*  --   --    CALOS 9.0 9.1 8.3*  --  8.0*  --    < >  --    < > 8.7 8.7   < > 8.9   PHOS  --   --   --   --   --   --   --  5.2*   < > 5.0*  --    < > 5.4*   MAG  --   --  1.5*   < >  --   --   --  2.6*   < >  2.1 2.3   < > 2.0   LACT  --   --   --   --  0.7 1.4  --  0.9  --  6.9*  --   --  9.5*   PCAL  --   --   --   --   --   --   --   --   --  7.95*  --   --   --    CKT  --   --   --   --   --   --   --   --   --  884*  --   --  39    < > = values in this interval not displayed.       Hepatic Studies    Recent Labs   Lab Test 06/25/21  0000 05/28/21  0000 03/19/21  0000 03/15/21  0551 03/14/21  1720 03/14/21  0459 11/15/20  0830 11/14/20 2041   BILITOTAL  --   --  0.5 0.4 0.4  --    < > 0.7   DBIL  --   --   --   --   --   --   --  0.4*   ALKPHOS  --   --  138.0 121 128  --    < > 184*   PROTTOTAL  --   --  8.8 7.7 8.2   < >   < > 7.8   ALBUMIN  --   --  4.4 2.9* 3.3*  --    < > 2.6*   AST  --   --  60.0 39 42  --    < > 48*   ALT  --   --  35 29 32  --    < > 56    314* 336 283*  --   --   --   --     < > = values in this interval not displayed.       Hematology Studies      Recent Labs   Lab Test 03/19/21  0000 03/17/21  0613 03/16/21  0551 03/15/21  0551 03/14/21 0459 03/13/21  0440 03/07/21  0543 03/06/21  0430 11/05/20  0000   WBC 7.9 6.6 6.5 6.3 5.9 5.8 4.5 4.4  --    ANEU  --   --   --   --   --   --  2.6 2.8  --    ALYM  --   --   --   --   --   --  0.9 0.7*  --    GIULIANO  --   --   --   --   --   --  0.7 0.6  --    AEOS  --   --   --   --   --   --  0.3 0.2  --    HGB 10.1* 9.0* 8.9* 8.7* 8.5* 8.4* 7.9* 8.0*   < >   HCT 32.2 29.0* 29.0* 28.8* 28.5* 27.4* 25.9* 25.3*   < >    268 266 275 259 257 208 196  --     < > = values in this interval not displayed.       Inflammatory Markers    Recent Labs   Lab Test 02/16/21  2219 02/15/21  1910 02/11/21  0838 12/17/20  0756 12/14/20  0815 11/05/20  0000   SED 72* 47*  --   --   --   --    .0* 270.0* 8.6 8.0 6.1 54.9     Microbiology:  Last Culture results with specimen source  Culture Micro   Date Value Ref Range Status   02/17/2021 No growth  Final   02/17/2021 No growth  Final   02/16/2021 Light growth  Enterococcus faecium   (A)  Final    02/16/2021 (A)  Final    Legionella pneumophila  isolated  Sent to [facility] for serotyping     02/16/2021   Final    Critical Value/Significant Value, preliminary result only, called to and read back by  Shobha Pedro RN on 2.23.21 at 1401. bw     02/16/2021 (A)  Final    Report received from the [place] Dept. of Health:  Legionella pneumophila serogroup 1  This test was developed and its performance characteristics determined by the [facility].  It has not been cleared or approved by the U.S. Food and Drug   Administration:21CFR 809.30(e).  The FDA has determined that such clearance is not   necessary.     02/15/2021 Moderate growth  Staphylococcus aureus   (A)  Final   02/15/2021 Moderate growth  Strain 2  Staphylococcus aureus   (A)  Final   02/15/2021 Moderate growth  Strain 3  Staphylococcus aureus   (A)  Final   02/15/2021 Light growth  Normal skin freeman    Final   02/15/2021 (A)  Final    Canceled, Test credited  >10 Squamous epithelial cells/low power field indicates oral contamination. Please   recollect.  Notification of test cancellation was given to  Bethanie Murillo RN on 2.15.21 at 2326. JRT     02/15/2021 No growth  Final   02/15/2021 No growth  Final   02/08/2021 No growth  Final   02/08/2021 No growth  Final   02/08/2021 Moderate growth  Staphylococcus aureus   (A)  Final   02/08/2021 Moderate growth  Strain 2  Staphylococcus aureus   (A)  Final   02/08/2021 No anaerobes isolated  Final        Last check of C difficile  C Diff Toxin B PCR   Date Value Ref Range Status   11/15/2020 Negative NEG^Negative Final     Comment:     Negative: C. difficile target DNA sequences NOT detected, presumed negative   for C.difficile toxin B or the number of bacteria present may be below the   limit of detection for the test.  FDA approved assay performed using [employer] real-time PCR.  A negative result does not exclude actual disease due to C. difficile and may   be due to improper  collection, handling and storage of the specimen or the   number of organisms in the specimen is below the detection limit of the assay.       Urine Studies     Recent Labs   Lab Test 02/16/21  0225 11/17/200825 02/22/20  0944 02/13/20  0334 02/07/20 2029   URINEPH 5.5 5.5 5.5 6.0 5.0   NITRITE Negative Negative Negative Negative Negative   LEUKEST Negative Negative Small* Small* Negative   WBCU 0 0 2 81* 3     Imaging:  No recent imaging

## 2021-08-05 NOTE — PATIENT INSTRUCTIONS
Please continue cephalexin 500 mg twice a day  When you are here in August please get labs completed   I recommend that you get the second covid 19 vaccination especially given the delta variant but I will also contact Dr. Cisneros

## 2021-08-06 ENCOUNTER — ANTICOAGULATION THERAPY VISIT (OUTPATIENT)
Dept: ANTICOAGULATION | Facility: CLINIC | Age: 66
End: 2021-08-06

## 2021-08-06 DIAGNOSIS — I48.0 PAROXYSMAL ATRIAL FIBRILLATION (H): ICD-10-CM

## 2021-08-06 DIAGNOSIS — Z95.811 LVAD (LEFT VENTRICULAR ASSIST DEVICE) PRESENT (H): Primary | ICD-10-CM

## 2021-08-06 DIAGNOSIS — I50.22 CHRONIC SYSTOLIC CONGESTIVE HEART FAILURE (H): ICD-10-CM

## 2021-08-06 LAB — INR (EXTERNAL): 2.4 (ref 0.9–1.1)

## 2021-08-06 NOTE — PROGRESS NOTES
ANTICOAGULATION MANAGEMENT     Eliseo Tanner 66 year old male is on warfarin with therapeutic INR result. (Goal INR 2.0-3.0)    Recent labs: (last 7 days)     08/06/21  0000   INR 2.4*       ASSESSMENT     Source(s): Chart Review       Warfarin doses taken: Warfarin taken as instructed    Diet: No new diet changes identified    New illness, injury, or hospitalization: No    Medication/supplement changes: None noted    Signs or symptoms of bleeding or clotting: No    Previous INR: Therapeutic last 2(+) visits    Additional findings: None     PLAN     Recommended plan for no diet, medication or health factor changes affecting INR     Dosing Instructions: Continue your current warfarin dose with next INR in 2 weeks       Summary  As of 8/6/2021    Full warfarin instructions:  4 mg every day   Next INR check:  8/20/2021             Telephone call with Eliseo who verbalizes understanding and agrees to plan and who agrees to plan and repeated back plan correctly    Patient using outside facility for labs    Education provided: None required    Plan made per [facility] anticoagulation protocol and per LVAD protocol    Gloria Abbasi RN  Anticoagulation Clinic  8/6/2021    _______________________________________________________________________     Anticoagulation Episode Summary     Current INR goal:  2.0-3.0   TTR:  88.2 % (10.6 mo)   Target end date:  Indefinite   Send INR reminders to:  [facility]    Indications    LVAD (left ventricular assist device) present (H) [Z95.811]  Chronic systolic congestive heart failure (H) [I50.22]  Paroxysmal atrial fibrillation (H) [I48.0]           Comments:  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  HM 3  placed 2/18/2020, 81mg ASA, Speak to spouse, Veronique at [phone]  4/14/2020:  Lab: [facility]:  ph: [phone] opt 5;   fax: [phone]         Anticoagulation Care Providers     Provider Role Specialty  Phone number    Nader Cisneros MD Referring Advanced Heart Failure and Transplant Cardiology [phone]

## 2021-08-15 ENCOUNTER — HEALTH MAINTENANCE LETTER (OUTPATIENT)
Age: 66
End: 2021-08-15

## 2021-08-16 DIAGNOSIS — I50.22 CHRONIC SYSTOLIC CONGESTIVE HEART FAILURE (H): Primary | ICD-10-CM

## 2021-08-16 NOTE — PROGRESS NOTES
August 17, 2021     Eliseo Tanner is a 65 year old male with chronic systolic heart failure secondary to NICM now s/p HM 3 on 2/18, moderate CAD, HTN, ABHINAV on CPAP, DM2, CKD Stage III, ANA. He presents today for follow up clinic visit.     His HM3 post-op course was complicated by retrosternal hematoma and bleeding in the lungs, RV failure,VT in ICU now on amiodarone and Afib w/AVR S/p DCCV on 2/28. He had pre-op proteus and enterococcus bacteremia from 2/13, s/p abx. He had an ICD placed on 3/16/2020 just before his discharge from the hospital. He felt better after his LVAD implantation. On 11/14/20, patient was directly admitted by ID from clinic after BCx positive for MSSA.  Of note, patient did have a few days of diarrhea which had improved on day of admission and resolved since 11/15/20 (C.diff negative). Abdominal imaging (US and CT abd/pelvis) revealed no intraabdominal fluid collection concerning for abscess/infectious process. TTE did not comment on vegetation. CHAMP also did not show any signs of endocarditis.  Last positive BCx on 11/14/20 showed MSSA, and abx was stopped on 12/27/20. He then had a hospitalization 2/15/21-3/17/21 at [facility] for mixed cardiogenic and distributive shock with an intermittent wide complex tachycardia(thought to be 2/2 to 3:1 aflutter with aberrant conduction). Initially requiring high doses of vasopressors and inotropes and was intubated for hypoxemia. LDH severely elevated yet thought to be related to legionella rather than actual pump thrombus. Also had acute liver injury with LFTs in the several thousands. He also required CRRT for acute renal failure. He was treated for legionella pneumonia and ultimately recovered relatively well and was discharged in stable condition not requiring dialysis (cession of HD 3/10). He presents to clinic for follow up. Weight had been prior at around 194 lbs.     He was last seen in clinic on 4/2021 where his BP were increased. Amlodipine was  increased to 5mg. There were no changes to LVAD parameters during the visit. He was then asked to follow up in 3 months.     Today, during his clinic visit, he is overall doing well. He states that there is brown discharge from his driveline site. Wife states that the discharge is slightly more damp. He also endorses increased nose bleeds, was using afrin, but was told to stop taking it by PCP. He states that his dry weight is 210-215lbs, which is stable. His weight in clinic was 222lbs, however this was with all the LVAD equipment. He denies chest pain, SOB, lightheadedness and dizziness.      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  SOCIAL HISTORY:  Social History     Socioeconomic History     Marital status:      Spouse name: Not on file     Number of children: Not on file     Years of education: Not on file     Highest education level: Not on file   Occupational History     Not on file   Tobacco Use     Smoking status: Former Smoker     Quit date: 1994     Years since quittin.3     Smokeless tobacco: Never Used   Substance and Sexual Activity     Alcohol use: Not Currently     Drug use: Not Currently     Sexual activity: Not on file   Other Topics Concern     Not on file   Social History Narrative     Not on file     Social Determinants of Health     Financial Resource Strain:      Difficulty of Paying Living Expenses:    Food Insecurity:      Worried About Running Out of Food in the Last Year:      Ran Out of Food in the Last Year:    Transportation Needs:      Lack of Transportation (Medical):      Lack of Transportation (Non-Medical):    Physical Activity:      Days  of Exercise per Week:      Minutes of Exercise per Session:    Stress:      Feeling of Stress :    Social Connections:      Frequency of Communication with Friends and Family:      Frequency of Social Gatherings with Friends and Family:      Attends Congregation Services:      Active Member of Clubs or Organizations:      Attends Club or Organization Meetings:      Marital Status:    Intimate Partner Violence:      Fear of Current or Ex-Partner:      Emotionally Abused:      Physically Abused:      Sexually Abused:      CURRENT MEDICATIONS:  Current Outpatient Medications   Medication     allopurinol (ZYLOPRIM) 100 MG tablet     amiodarone (PACERONE) 200 MG tablet     amLODIPine (NORVASC) 5 MG tablet     aspirin (ASA) 81 MG EC tablet     atorvastatin (LIPITOR) 20 MG tablet     B Complex-C-Folic Acid (VIRT-CAPS) 1 MG CAPS     bumetanide (BUMEX) 1 MG tablet     cephALEXin (KEFLEX) 500 MG capsule     co-enzyme Q-10 200 MG CAPS     finasteride (PROSCAR) 5 MG tablet     FLUoxetine (PROZAC) 10 MG capsule     hydrALAZINE (APRESOLINE) 50 MG tablet     insulin glargine (BASAGLAR KWIKPEN) 100 UNIT/ML pen     insulin pen needle (BD JAIME U/F) 32G X 4 MM miscellaneous     magnesium oxide (MAG-OX) 400 MG tablet     nitroGLYcerin (NITROSTAT) 0.4 MG sublingual tablet     omeprazole (PRILOSEC) 20 MG DR capsule     potassium chloride ER (KLOR-CON M) 20 MEQ CR tablet     senna-docusate (SENOKOT-S/PERICOLACE) 8.6-50 MG tablet     tamsulosin (FLOMAX) 0.4 MG capsule     warfarin ANTICOAGULANT (COUMADIN) 2 MG tablet     warfarin ANTICOAGULANT (COUMADIN) 4 MG tablet     zolpidem (AMBIEN) 5 MG tablet     No current facility-administered medications for this visit.     ROS:   Constitutional: No fever, chills, or sweats. Weight is 222 lbs 8 oz  ENT: No visual disturbance, ear ache, epistaxis, sore throat.   Allergies/Immunologic: Negative.   Respiratory: No cough, hemoptysis.   Cardiovascular: As per HPI.   GI: No nausea, vomiting, hematemesis,  "melena, or hematochezia.   : No urinary frequency, dysuria, or hematuria.   Integument: Negative.   Psychiatric: Pleasant, no major depression noted  Neuro: No focal neurological deficits noted  Endocrinology: Negative.   Musculoskeletal: As per HPI.      EXAM:  BP (!) 104/0   Pulse (!) 47   Ht 1.72 m (5' 7.72\")   Wt 100.9 kg (222 lb 8 oz)   SpO2 96%   BMI 34.11 kg/m    General: appears comfortable, alert and oriented  Head: normocephalic, atraumatic  Eyes: anicteric sclera, EOMI , PERRL  Neck: no adenopathy  Orophyarynx: moist mucosa, no lesions noted  Heart: LVAD hum   Lungs: CTAB, No wheezing.   Abdomen: soft, non-tender, bowel sounds present, no hepatosplenomegaly  Extremities: No LE edema today  Skin: no open lesions noted  Neuro: grossly non-focal     Labs:  Lab Results   Component Value Date    WBC 6.0 08/17/2021    HGB 12.7 (L) 08/17/2021    HCT 40.6 08/17/2021     08/17/2021     04/09/2021    POTASSIUM 4.2 04/09/2021    CHLORIDE 103 04/09/2021    CO2 28 04/09/2021    BUN 35 04/09/2021    CR 1.6 04/09/2021     (A) 04/09/2021    SED 72 (H) 02/16/2021    DD 4,940 (A) 03/19/2021    NTBNPI 27,781 (H) 02/15/2021    TROPI 0.377 (HH) 02/15/2021    AST 60.0 03/19/2021    ALT 35 03/19/2021    ALKPHOS 138.0 03/19/2021    BILITOTAL 0.5 03/19/2021    INR 2.54 (H) 08/17/2021       Echocardiogram reviewed:     [facility] 1/07/2021  RA: 5  PA: 24/7/13  PCWP: 3    CO/CI: 5.41/2.64      TTE 2/2021  Technically difficult study.  HM III LVAD is in place at 5500 RPM. Left ventricular diastolic diameter is  4.3cm with a midline septum that has abnormal septal motion consistent with  prior sternotomy and/or conduction abnormalities. The aortic valve opens  intermitently and has mild aortic regurgitation. The LVAD inflow velocities  are not well seen but out flow velocities are normal.  The left ventricular ejection fraction is difficult to quantify due to  difficulty of images but is grossly estimated at " 15-20%.  Due to technically difficult images quantification of the right ventricle is  not possible however the right ventricular function appears at least moderate  to severely reduced and dilated grossly.  There is at least mild mitral regurgitation that is eccentric.  IVC diameter >2.1 cm collapsing <50% with sniff suggests a high RA pressure  estimated at 15 mmHg or greater.  Compared to prior imaging on 2/16 there is no significant changes noted though  inflow velocities are not well seen on todays' exam.     ASSESSMENT AND PLAN:  Eliseo Tanner is a 65 year old male with chronic systolic heart failure secondary to NICM now s/p HM 3 on 2/18, moderate CAD, HTN, ABHINAV on CPAP, DM2, CKD Stage III, ANA. His HM3 post-op course was complicated by retrosternal hematoma and bleeding in the lungs, RV failure,VT in ICU now on amiodarone and Afib w/AVR S/p DCCV on 2/28. He had pre-op proteus and enterococcus bacteremia from 2/13, s/p abx. He presents today for follow up clinic visit.     Patient presenting today 6 months after complicated ICU stay. Now overall doing better. He endorses NYHA Class 1-2 symptoms. His LVAD parameters today are Flow: 4.2  Speed: 5500   PI: 4.5  Power:4.2  there are no flow events on interrogation today.     His LDH on 6/25/2021 was 301. It's relatively unchanged. Will continue with ASA and Coumadin today.     Chronic SCHF s/p HM III LVAD. Elevated LDH in setting of infection, resolved. Implanted 2/18/20.   Stage D, NYHA Class III  BB: Defer due to history of shock  ACEI.ARB: Defer  Afterload reduction: Hydral 100mg TID  HTN: Amlodipine 5mg   LDH: CTM  Anticoag: Coumadin per pharmacy. Decreased goal to 1.8-2.5 due to nose bleeding  Antiplaetlet: ASA 81mg qday  SCD: ICD in place and functioning well  Diuresis: Bumex 3mg qday   Antibx: Keflex 500mg BID, LVAD coordinator to assess driveline site.     Moderate CAD  - ASA 81mg  - Lipitor 40mg     3:1 atrial flutter with aberrant conduction vs  ventricular tachycardia causing wide complex tachycardia   - Continue amioadarone 200mg qday   - CHADASVAC: 3, continue Coumadin  - Add on TSH today     CKD stage IV  - Initially placed on dialysis on 2/2021 due to shock related ATN  - Now off dialysis  - Cr has improved with most recent Cr: 1.55 on 6/17/2021  - Diuresis as above.         Chronic microcytic/hypochromic anemia. Coagulopathy related to sepsis, resolved. 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   On Coumadin. Plt stable   - continue warfarin     DM Type II, controlled.   A1C 6.8 1/6/21  - Management per PCP.      Staffed with Dr. Blayne Portillo MD  Cardiology Fellow  [id number]     I have seen and evaluated the pt and agree with the assessment and plan as above. Had not taken his meds this AM yet, will send a doppler so his wife can check BP at home. If elevated then will adjust antihypertensives. Reducing INR goal to 1.8-2.3 for epistaxis  Device was interrogated at bedside, speed is 5500RPM , flow is 4.4   Nader Cisneros MD

## 2021-08-17 ENCOUNTER — OFFICE VISIT (OUTPATIENT)
Dept: CARDIOLOGY | Facility: CLINIC | Age: 66
End: 2021-08-17
Attending: INTERNAL MEDICINE
Payer: MEDICARE

## 2021-08-17 ENCOUNTER — LAB (OUTPATIENT)
Dept: LAB | Facility: CLINIC | Age: 66
End: 2021-08-17

## 2021-08-17 ENCOUNTER — ANCILLARY PROCEDURE (OUTPATIENT)
Dept: CARDIOLOGY | Facility: CLINIC | Age: 66
End: 2021-08-17
Attending: NURSE PRACTITIONER
Payer: MEDICARE

## 2021-08-17 ENCOUNTER — LAB (OUTPATIENT)
Dept: LAB | Facility: CLINIC | Age: 66
End: 2021-08-17
Payer: MEDICARE

## 2021-08-17 ENCOUNTER — ANTICOAGULATION THERAPY VISIT (OUTPATIENT)
Dept: ANTICOAGULATION | Facility: CLINIC | Age: 66
End: 2021-08-17

## 2021-08-17 ENCOUNTER — DOCUMENTATION ONLY (OUTPATIENT)
Dept: ANTICOAGULATION | Facility: CLINIC | Age: 66
End: 2021-08-17

## 2021-08-17 VITALS
HEART RATE: 47 BPM | SYSTOLIC BLOOD PRESSURE: 104 MMHG | HEIGHT: 68 IN | WEIGHT: 222.5 LBS | OXYGEN SATURATION: 96 % | BODY MASS INDEX: 33.72 KG/M2

## 2021-08-17 DIAGNOSIS — Z95.811 LVAD (LEFT VENTRICULAR ASSIST DEVICE) PRESENT (H): ICD-10-CM

## 2021-08-17 DIAGNOSIS — T82.7XXA INFECTION ASSOCIATED WITH DRIVELINE OF LEFT VENTRICULAR ASSIST DEVICE (LVAD) (H): ICD-10-CM

## 2021-08-17 DIAGNOSIS — I48.0 PAROXYSMAL ATRIAL FIBRILLATION (H): ICD-10-CM

## 2021-08-17 DIAGNOSIS — I50.22 CHRONIC SYSTOLIC CONGESTIVE HEART FAILURE (H): ICD-10-CM

## 2021-08-17 DIAGNOSIS — Z11.59 ENCOUNTER FOR SCREENING FOR OTHER VIRAL DISEASES: ICD-10-CM

## 2021-08-17 DIAGNOSIS — I50.22 CHRONIC SYSTOLIC CONGESTIVE HEART FAILURE (H): Primary | ICD-10-CM

## 2021-08-17 DIAGNOSIS — Z79.2 CHRONIC ANTIBIOTIC SUPPRESSION: ICD-10-CM

## 2021-08-17 DIAGNOSIS — Z95.811 LVAD (LEFT VENTRICULAR ASSIST DEVICE) PRESENT (H): Primary | ICD-10-CM

## 2021-08-17 DIAGNOSIS — I50.23 ACUTE ON CHRONIC SYSTOLIC CONGESTIVE HEART FAILURE (H): Primary | ICD-10-CM

## 2021-08-17 DIAGNOSIS — Z95.810 S/P ICD (INTERNAL CARDIAC DEFIBRILLATOR) PROCEDURE: ICD-10-CM

## 2021-08-17 LAB
ALBUMIN SERPL-MCNC: 3.8 G/DL (ref 3.4–5)
ALP SERPL-CCNC: 125 U/L (ref 40–150)
ALT SERPL W P-5'-P-CCNC: 46 U/L (ref 0–70)
ANION GAP SERPL CALCULATED.3IONS-SCNC: 7 MMOL/L (ref 3–14)
AST SERPL W P-5'-P-CCNC: 40 U/L (ref 0–45)
BASOPHILS # BLD AUTO: 0 10E3/UL (ref 0–0.2)
BASOPHILS NFR BLD AUTO: 1 %
BILIRUB SERPL-MCNC: 0.6 MG/DL (ref 0.2–1.3)
BUN SERPL-MCNC: 33 MG/DL (ref 7–30)
CALCIUM SERPL-MCNC: 8.9 MG/DL (ref 8.5–10.1)
CHLORIDE BLD-SCNC: 106 MMOL/L (ref 94–109)
CO2 SERPL-SCNC: 29 MMOL/L (ref 20–32)
CREAT SERPL-MCNC: 1.55 MG/DL (ref 0.66–1.25)
CRP SERPL-MCNC: 4.9 MG/L (ref 0–8)
D DIMER PPP FEU-MCNC: 3.08 UG/ML FEU (ref 0–0.5)
EOSINOPHIL # BLD AUTO: 0.3 10E3/UL (ref 0–0.7)
EOSINOPHIL NFR BLD AUTO: 5 %
ERYTHROCYTE [DISTWIDTH] IN BLOOD BY AUTOMATED COUNT: 23.3 % (ref 10–15)
GFR SERPL CREATININE-BSD FRML MDRD: 45 ML/MIN/1.73M2
GLUCOSE BLD-MCNC: 129 MG/DL (ref 70–99)
HCT VFR BLD AUTO: 40.6 % (ref 40–53)
HGB BLD-MCNC: 12.7 G/DL (ref 13.3–17.7)
IMM GRANULOCYTES # BLD: 0.1 10E3/UL
IMM GRANULOCYTES NFR BLD: 1 %
INR PPP: 2.5 (ref 0.85–1.15)
INR PPP: 2.54 (ref 0.85–1.15)
LDH SERPL L TO P-CCNC: 295 U/L (ref 85–227)
LYMPHOCYTES # BLD AUTO: 1.1 10E3/UL (ref 0.8–5.3)
LYMPHOCYTES NFR BLD AUTO: 19 %
MCH RBC QN AUTO: 25.9 PG (ref 26.5–33)
MCHC RBC AUTO-ENTMCNC: 31.3 G/DL (ref 31.5–36.5)
MCV RBC AUTO: 83 FL (ref 78–100)
MONOCYTES # BLD AUTO: 0.7 10E3/UL (ref 0–1.3)
MONOCYTES NFR BLD AUTO: 12 %
NEUTROPHILS # BLD AUTO: 3.7 10E3/UL (ref 1.6–8.3)
NEUTROPHILS NFR BLD AUTO: 62 %
NRBC # BLD AUTO: 0 10E3/UL
NRBC BLD AUTO-RTO: 0 /100
PLATELET # BLD AUTO: 163 10E3/UL (ref 150–450)
POTASSIUM BLD-SCNC: 3.8 MMOL/L (ref 3.4–5.3)
PROT SERPL-MCNC: 8.2 G/DL (ref 6.8–8.8)
RBC # BLD AUTO: 4.91 10E6/UL (ref 4.4–5.9)
SODIUM SERPL-SCNC: 142 MMOL/L (ref 133–144)
WBC # BLD AUTO: 6 10E3/UL (ref 4–11)

## 2021-08-17 PROCEDURE — 85025 COMPLETE CBC W/AUTO DIFF WBC: CPT | Performed by: PATHOLOGY

## 2021-08-17 PROCEDURE — 83615 LACTATE (LD) (LDH) ENZYME: CPT | Performed by: PATHOLOGY

## 2021-08-17 PROCEDURE — 86769 SARS-COV-2 COVID-19 ANTIBODY: CPT | Mod: 90 | Performed by: PATHOLOGY

## 2021-08-17 PROCEDURE — 93282 PRGRMG EVAL IMPLANTABLE DFB: CPT | Performed by: INTERNAL MEDICINE

## 2021-08-17 PROCEDURE — 87075 CULTR BACTERIA EXCEPT BLOOD: CPT | Mod: XS | Performed by: INTERNAL MEDICINE

## 2021-08-17 PROCEDURE — 86140 C-REACTIVE PROTEIN: CPT | Performed by: PATHOLOGY

## 2021-08-17 PROCEDURE — 85379 FIBRIN DEGRADATION QUANT: CPT | Performed by: INTERNAL MEDICINE

## 2021-08-17 PROCEDURE — 87077 CULTURE AEROBIC IDENTIFY: CPT | Performed by: INTERNAL MEDICINE

## 2021-08-17 PROCEDURE — 99215 OFFICE O/P EST HI 40 MIN: CPT | Mod: 25 | Performed by: INTERNAL MEDICINE

## 2021-08-17 PROCEDURE — 93750 INTERROGATION VAD IN PERSON: CPT | Performed by: INTERNAL MEDICINE

## 2021-08-17 PROCEDURE — 87040 BLOOD CULTURE FOR BACTERIA: CPT | Performed by: INTERNAL MEDICINE

## 2021-08-17 PROCEDURE — 36415 COLL VENOUS BLD VENIPUNCTURE: CPT | Performed by: PATHOLOGY

## 2021-08-17 PROCEDURE — G0463 HOSPITAL OUTPT CLINIC VISIT: HCPCS | Mod: 25

## 2021-08-17 PROCEDURE — 85610 PROTHROMBIN TIME: CPT | Performed by: PATHOLOGY

## 2021-08-17 PROCEDURE — 80053 COMPREHEN METABOLIC PANEL: CPT | Performed by: PATHOLOGY

## 2021-08-17 ASSESSMENT — MIFFLIN-ST. JEOR: SCORE: 1749.26

## 2021-08-17 ASSESSMENT — PAIN SCALES - GENERAL: PAINLEVEL: NO PAIN (0)

## 2021-08-17 NOTE — LETTER
8/17/2021      RE: Eliseo Tanner  [address] [place] [address]       Dear Colleague,    Thank you for the opportunity to participate in the care of your patient, Eliseo Tanner, at the [facility] [place] at [facility]. Please see a copy of my visit note below.    August 17, 2021     Eliseo Tanner is a 65 year old male with chronic systolic heart failure secondary to NICM now s/p HM 3 on 2/18, moderate CAD, HTN, ABHINAV on CPAP, DM2, CKD Stage III, ANA. He presents today for follow up clinic visit.     His HM3 post-op course was complicated by retrosternal hematoma and bleeding in the lungs, RV failure,VT in ICU now on amiodarone and Afib w/AVR S/p DCCV on 2/28. He had pre-op proteus and enterococcus bacteremia from 2/13, s/p abx. He had an ICD placed on 3/16/2020 just before his discharge from the hospital. He felt better after his LVAD implantation. On 11/14/20, patient was directly admitted by ID from clinic after BCx positive for MSSA.  Of note, patient did have a few days of diarrhea which had improved on day of admission and resolved since 11/15/20 (C.diff negative). Abdominal imaging (US and CT abd/pelvis) revealed no intraabdominal fluid collection concerning for abscess/infectious process. TTE did not comment on vegetation. CHAMP also did not show any signs of endocarditis.  Last positive BCx on 11/14/20 showed MSSA, and abx was stopped on 12/27/20. He then had a hospitalization 2/15/21-3/17/21 at [facility] for mixed cardiogenic and distributive shock with an intermittent wide complex tachycardia(thought to be 2/2 to 3:1 aflutter with aberrant conduction). Initially requiring high doses of vasopressors and inotropes and was intubated for hypoxemia. LDH severely elevated yet thought to be related to legionella rather than actual pump thrombus. Also had acute liver injury with LFTs in the several thousands. He also required CRRT for  acute renal failure. He was treated for legionella pneumonia and ultimately recovered relatively well and was discharged in stable condition not requiring dialysis (cession of HD 3/10). He presents to clinic for follow up. Weight had been prior at around 194 lbs.     He was last seen in clinic on 2021 where his BP were increased. Amlodipine was increased to 5mg. There were no changes to LVAD parameters during the visit. He was then asked to follow up in 3 months.     Today, during his clinic visit, he is overall doing well. He states that there is brown discharge from his driveline site. Wife states that the discharge is slightly more damp. He also endorses increased nose bleeds, was using afrin, but was told to stop taking it by PCP. He states that his dry weight is 210-215lbs, which is stable. His weight in clinic was 222lbs, however this was with all the LVAD equipment. He denies chest pain, SOB, lightheadedness and dizziness.      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  SOCIAL HISTORY:  Social History     Socioeconomic History     Marital status:      Spouse name: Not on file     Number of children: Not on file     Years of education: Not on file     Highest education level: Not on file   Occupational History     Not on file   Tobacco Use     Smoking status: Former Smoker     Quit date: 1994     Years since quittin.3     Smokeless tobacco: Never Used   Substance and Sexual Activity     Alcohol use: Not Currently     Drug use: Not Currently     Sexual activity: Not on file   Other Topics Concern     Not on file   Social History Narrative     Not on file     Social  Determinants of Health     Financial Resource Strain:      Difficulty of Paying Living Expenses:    Food Insecurity:      Worried About Running Out of Food in the Last Year:      Ran Out of Food in the Last Year:    Transportation Needs:      Lack of Transportation (Medical):      Lack of Transportation (Non-Medical):    Physical Activity:      Days of Exercise per Week:      Minutes of Exercise per Session:    Stress:      Feeling of Stress :    Social Connections:      Frequency of Communication with Friends and Family:      Frequency of Social Gatherings with Friends and Family:      Attends Yarsani Services:      Active Member of Clubs or Organizations:      Attends Club or Organization Meetings:      Marital Status:    Intimate Partner Violence:      Fear of Current or Ex-Partner:      Emotionally Abused:      Physically Abused:      Sexually Abused:      CURRENT MEDICATIONS:  Current Outpatient Medications   Medication     allopurinol (ZYLOPRIM) 100 MG tablet     amiodarone (PACERONE) 200 MG tablet     amLODIPine (NORVASC) 5 MG tablet     aspirin (ASA) 81 MG EC tablet     atorvastatin (LIPITOR) 20 MG tablet     B Complex-C-Folic Acid (VIRT-CAPS) 1 MG CAPS     bumetanide (BUMEX) 1 MG tablet     cephALEXin (KEFLEX) 500 MG capsule     co-enzyme Q-10 200 MG CAPS     finasteride (PROSCAR) 5 MG tablet     FLUoxetine (PROZAC) 10 MG capsule     hydrALAZINE (APRESOLINE) 50 MG tablet     insulin glargine (BASAGLAR KWIKPEN) 100 UNIT/ML pen     insulin pen needle (BD JAIME U/F) 32G X 4 MM miscellaneous     magnesium oxide (MAG-OX) 400 MG tablet     nitroGLYcerin (NITROSTAT) 0.4 MG sublingual tablet     omeprazole (PRILOSEC) 20 MG DR capsule     potassium chloride ER (KLOR-CON M) 20 MEQ CR tablet     senna-docusate (SENOKOT-S/PERICOLACE) 8.6-50 MG tablet     tamsulosin (FLOMAX) 0.4 MG capsule     warfarin ANTICOAGULANT (COUMADIN) 2 MG tablet     warfarin ANTICOAGULANT (COUMADIN) 4 MG tablet     zolpidem (AMBIEN) 5 MG  "tablet     No current facility-administered medications for this visit.     ROS:   Constitutional: No fever, chills, or sweats. Weight is 222 lbs 8 oz  ENT: No visual disturbance, ear ache, epistaxis, sore throat.   Allergies/Immunologic: Negative.   Respiratory: No cough, hemoptysis.   Cardiovascular: As per HPI.   GI: No nausea, vomiting, hematemesis, melena, or hematochezia.   : No urinary frequency, dysuria, or hematuria.   Integument: Negative.   Psychiatric: Pleasant, no major depression noted  Neuro: No focal neurological deficits noted  Endocrinology: Negative.   Musculoskeletal: As per HPI.      EXAM:  BP (!) 104/0   Pulse (!) 47   Ht 1.72 m (5' 7.72\")   Wt 100.9 kg (222 lb 8 oz)   SpO2 96%   BMI 34.11 kg/m    General: appears comfortable, alert and oriented  Head: normocephalic, atraumatic  Eyes: anicteric sclera, EOMI , PERRL  Neck: no adenopathy  Orophyarynx: moist mucosa, no lesions noted  Heart: LVAD hum   Lungs: CTAB, No wheezing.   Abdomen: soft, non-tender, bowel sounds present, no hepatosplenomegaly  Extremities: No LE edema today  Skin: no open lesions noted  Neuro: grossly non-focal     Labs:  Lab Results   Component Value Date    WBC 6.0 08/17/2021    HGB 12.7 (L) 08/17/2021    HCT 40.6 08/17/2021     08/17/2021     04/09/2021    POTASSIUM 4.2 04/09/2021    CHLORIDE 103 04/09/2021    CO2 28 04/09/2021    BUN 35 04/09/2021    CR 1.6 04/09/2021     (A) 04/09/2021    SED 72 (H) 02/16/2021    DD 4,940 (A) 03/19/2021    NTBNPI 27,781 (H) 02/15/2021    TROPI 0.377 (HH) 02/15/2021    AST 60.0 03/19/2021    ALT 35 03/19/2021    ALKPHOS 138.0 03/19/2021    BILITOTAL 0.5 03/19/2021    INR 2.54 (H) 08/17/2021       Echocardiogram reviewed:     [facility] 1/07/2021  RA: 5  PA: 24/7/13  PCWP: 3    CO/CI: 5.41/2.64      TTE 2/2021  Technically difficult study.  HM III LVAD is in place at 5500 RPM. Left ventricular diastolic diameter is  4.3cm with a midline septum that has abnormal septal " motion consistent with  prior sternotomy and/or conduction abnormalities. The aortic valve opens  intermitently and has mild aortic regurgitation. The LVAD inflow velocities  are not well seen but out flow velocities are normal.  The left ventricular ejection fraction is difficult to quantify due to  difficulty of images but is grossly estimated at 15-20%.  Due to technically difficult images quantification of the right ventricle is  not possible however the right ventricular function appears at least moderate  to severely reduced and dilated grossly.  There is at least mild mitral regurgitation that is eccentric.  IVC diameter >2.1 cm collapsing <50% with sniff suggests a high RA pressure  estimated at 15 mmHg or greater.  Compared to prior imaging on 2/16 there is no significant changes noted though  inflow velocities are not well seen on todays' exam.     ASSESSMENT AND PLAN:  Eliseo Tanner is a 65 year old male with chronic systolic heart failure secondary to NICM now s/p HM 3 on 2/18, moderate CAD, HTN, ABHINAV on CPAP, DM2, CKD Stage III, ANA. His HM3 post-op course was complicated by retrosternal hematoma and bleeding in the lungs, RV failure,VT in ICU now on amiodarone and Afib w/AVR S/p DCCV on 2/28. He had pre-op proteus and enterococcus bacteremia from 2/13, s/p abx. He presents today for follow up clinic visit.     Patient presenting today 6 months after complicated ICU stay. Now overall doing better. He endorses NYHA Class 1-2 symptoms. His LVAD parameters today are Flow: 4.2  Speed: 5500   PI: 4.5  Power:4.2  there are no flow events on interrogation today.     His LDH on 6/25/2021 was 301. It's relatively unchanged. Will continue with ASA and Coumadin today.     Chronic SCHF s/p HM III LVAD. Elevated LDH in setting of infection, resolved. Implanted 2/18/20.   Stage D, NYHA Class III  BB: Defer due to history of shock  ACEI.ARB: Defer  Afterload reduction: Hydral 100mg TID  HTN: Amlodipine 5mg   LDH:  CTM  Anticoag: Coumadin per pharmacy. Decreased goal to 1.8-2.5 due to nose bleeding  Antiplaetlet: ASA 81mg qday  SCD: ICD in place and functioning well  Diuresis: Bumex 3mg qday   Antibx: Keflex 500mg BID, LVAD coordinator to assess driveline site.     Moderate CAD  - ASA 81mg  - Lipitor 40mg     3:1 atrial flutter with aberrant conduction vs ventricular tachycardia causing wide complex tachycardia   - Continue amioadarone 200mg qday   - CHADASVAC: 3, continue Coumadin  - Add on TSH today     CKD stage IV  - Initially placed on dialysis on 2/2021 due to shock related ATN  - Now off dialysis  - Cr has improved with most recent Cr: 1.55 on 6/17/2021  - Diuresis as above.         Chronic microcytic/hypochromic anemia. Coagulopathy related to sepsis, resolved. 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   On Coumadin. Plt stable   - continue warfarin     DM Type II, controlled.   A1C 6.8 1/6/21  - Management per PCP.      Staffed with Dr. Blayne Portillo MD  Cardiology Fellow  [id number]     I have seen and evaluated the pt and agree with the assessment and plan as above. Had not taken his meds this AM yet, will send a doppler so his wife can check BP at home. If elevated then will adjust antihypertensives. Reducing INR goal to 1.8-2.3 for epistaxis  Device was interrogated at bedside, speed is 5500RPM , flow is 4.4   Nader Cisneros MD

## 2021-08-17 NOTE — PROGRESS NOTES
Addendum: INR goal range changed today per new referral to 1.8-2.3. Consulted with Sarah LIGHT RPH for dosing recommendations and recheck date. Maintenance dose changed to 2 mg every Tues, Fri; 4 mg all other days. Recheck in one week. [facility]    ANTICOAGULATION MANAGEMENT     Eliseo Tanner 66 year old male is on warfarin with therapeutic INR result. (Goal INR 2.0-3.0)    Recent labs: (last 7 days)     08/17/21  0807   INR 2.54*       ASSESSMENT     Source(s): Chart Review       Warfarin doses taken: Reviewed in chart    Diet: Unable to reach for an assessment    New illness, injury, or hospitalization: Unable to reach for an assessment.    Medication/supplement changes: None noted    Signs or symptoms of bleeding or clotting: Unable to reach for an assessment.    Previous INR: Therapeutic last 2(+) visits    Additional findings: None     PLAN     Recommended plan for no diet, medication or health factor changes affecting INR     Dosing Instructions: Continue your current warfarin dose with next INR in 2-3 weeks       Summary  As of 8/17/2021    Full warfarin instructions:  2 mg every Tue, Fri; 4 mg all other days   Next INR check:  8/24/2021             Detailed voice message left for Eliseo with dosing instructions and follow up date.     Contact [phone] to schedule and with any changes, questions or concerns.     Education provided: Please call back if any changes to your diet, medications or how you've been taking warfarin, Monitoring for bleeding signs and symptoms, Monitoring for clotting signs and symptoms, Importance of notifying clinic for changes in medications; a sooner lab recheck maybe needed., Importance of notifying clinic of upcoming surgeries and procedures 2 weeks in advance and Contact [phone] with any changes, questions or concerns.     Plan made per [facility] anticoagulation protocol and per LVAD protocol    PACO MARQUEZ RN  Anticoagulation  Clinic  8/17/2021    _______________________________________________________________________     Anticoagulation Episode Summary     Current INR goal:  2.0-3.0   TTR:  88.2 % (10.6 mo)   Target end date:  Indefinite   [facility] reminders to:  [facility]    Indications    LVAD (left ventricular assist device) present (H) [Z95.811]  Chronic systolic congestive heart failure (H) [I50.22]  Paroxysmal atrial fibrillation (H) [I48.0]           Comments:  INR GOAL RANGE CHANGE 8/17/21 1.8-2.3 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 HM 3  placed 2/18/2020, 81mg ASA, Speak to spouse, Veronique at [phone] 4/14/2020:  Lab: [facility]:(p)[phone] (f) [phone]         Anticoagulation Care Providers     Provider Role Specialty Phone number    Nader Cisneros MD Referring Advanced Heart Failure and Transplant Cardiology [phone]

## 2021-08-17 NOTE — NURSING NOTE
Chief Complaint   Patient presents with     Follow Up     return HF     Vitals were taken and medications reconciled.    Kareem Ellison, EMT  9:11 AM

## 2021-08-17 NOTE — PATIENT INSTRUCTIONS
Medications:  1. No changes today.     Instructions:  1. We will lower the INR goal to 1.8-2.3  2. Get a Covid antibody test  3. We will order a doppler blood pressure cuff for you.   4. Please return to lab today for blood cultures and covid antibody screening.     Follow-up: (make these appointments before you leave)  1. Please follow-up with Dr. Cisneros in 3 months in [place] with labs prior. (12/8 - we will contact the [place] )  2. Please follow-up with Dr. Cisneros in 6 months with labs prior.  Can appointment be virtual? No     Page the VAD Coordinator on call if you gain more than 3 lb in a day or 5 in a week. Please also page if you feel unwell or have alarms.     Great to see you in clinic today. To Page the VAD Coordinator on call, dial [phone] option #4 and ask to speak to the VAD coordinator on call.

## 2021-08-17 NOTE — PROGRESS NOTES
Anticoagulation Enrollment received on Eliseo. New anticoagulation clinic referral order placed and chart reviewed:     Per office visit today 8/17 with Dr. Cisneros: Decreased goal to 1.8-2.5 due to nose bleeding.    Per the anticoagulation referral order: INR Goal: 1.8-2.3 due to epistaxis.    Antonette MEYER RN messaged to clarify new goal range.     Following message received from Antonette:.     Antonette Correia RN Jax, Gianna FERNANDEZ RN  Yes, I just clarified with Blayne and placed a new anitcoag order and verified 1.8-2.3.   Thanks for checking!           Previous Messages       ----- Message -----   From: Gianna Marquez RN   Sent: 8/17/2021  10:33 AM CDT   To: KRISTEN Bender.      I just wanted to clarify with you what Eliseo's new goal range is. Per Dr. Cisneros's note 1.8-2.5, however per the referral order the goal range is 1.8-2.3. Can you please clarify which goal range we need to follow?     Thank you,     GIANNA MARQUEZ, KRISTEN-BC, [facility]   Anticoagulation Clinic   [phone]

## 2021-08-17 NOTE — PATIENT INSTRUCTIONS
It was a pleasure to see you in clinic today. Please do not hesitate to call with any questions or concerns. You are scheduled for a remote ICD transmission on 11/23/21. This will happen automatically in the night. We will call in 1-2 business days to discuss the results with you. We look forward to seeing you in clinic at your next device check in 6 months.    Yessenia Montiel, RN  Electrophysiology Nurse Clinician  [facility]  During business hours call:  [phone]  Urgent needs after hours- please call: [phone]- select option #4 and ask for job code [id number].

## 2021-08-17 NOTE — NURSING NOTE
1). PUMP DATA  Primary controller serial number: [id number]    HM 3:   Speed: 5500 RPMs,  Flow: 4.1 L/min,   Power: 4.6 Driver,  PI: 4.6 ,  Low Speed Limit: 5300 rpm,  Hct: 40.6    Primary controller   Back up battery: Patient use: 21, Replace in: 14  Months     Data downloaded: No   Equipment and driveline assessed for damage: Yes     Back up : Serial number: [id number]  Back up battery: Patient use: 11 Replace in: 14  Months  Programmed settings identical to the settings on the primary controller : N/A      Education complete: Yes   Charge the BACKUP controller s backup battery every 6 months  Perform a self test on BACKUP every 6 months  Change the MPU s batteries every 6 months:Yes  Have equipment serviced yearly (if applicable):Yes    2). ALARMS  Alarms reported by patient since last pump evaluation: No  Alarms or other finding noted during pump data history and alarm download: Yes - some PI events with hx back 3 days. PI ranging 2.7-5.8, no speed drops.   Reviewed with patient:  Yes      3). DRESSING CHANGE / DRIVELINE ASSESSMENT  Dressing change completed today: Yes  Appearance of Driveline site: scant amount of purulent drainage on skin, small amount accumulated on dressing. Pt denies pain.     Driveline stabilization: Method: [employer]  [ Teaching reinforced on need for stabilization of Driveline. ]

## 2021-08-18 LAB
SARS-COV-2 AB SERPL IA-ACNC: <0.4 U/ML
SARS-COV-2 AB SERPL-IMP: NEGATIVE

## 2021-08-20 ENCOUNTER — CARE COORDINATION (OUTPATIENT)
Dept: CARDIOLOGY | Facility: CLINIC | Age: 66
End: 2021-08-20

## 2021-08-20 ENCOUNTER — TELEPHONE (OUTPATIENT)
Dept: INFECTIOUS DISEASES | Facility: CLINIC | Age: 66
End: 2021-08-20

## 2021-08-20 DIAGNOSIS — T82.7XXA INFECTION ASSOCIATED WITH DRIVELINE OF LEFT VENTRICULAR ASSIST DEVICE (LVAD) (H): Primary | ICD-10-CM

## 2021-08-20 LAB
BACTERIA WND CULT: ABNORMAL
GRAM STAIN RESULT: ABNORMAL
GRAM STAIN RESULT: ABNORMAL
MDC_IDC_LEAD_IMPLANT_DT: NORMAL
MDC_IDC_LEAD_LOCATION: NORMAL
MDC_IDC_LEAD_LOCATION_DETAIL_1: NORMAL
MDC_IDC_LEAD_MFG: NORMAL
MDC_IDC_LEAD_MODEL: NORMAL
MDC_IDC_LEAD_POLARITY_TYPE: NORMAL
MDC_IDC_LEAD_SERIAL: NORMAL
MDC_IDC_LEAD_SPECIAL_FUNCTION: NORMAL
MDC_IDC_MSMT_BATTERY_DTM: NORMAL
MDC_IDC_MSMT_BATTERY_REMAINING_LONGEVITY: 129 MO
MDC_IDC_MSMT_BATTERY_RRT_TRIGGER: 2.73
MDC_IDC_MSMT_BATTERY_STATUS: NORMAL
MDC_IDC_MSMT_BATTERY_VOLTAGE: 3.01 V
MDC_IDC_MSMT_CAP_CHARGE_DTM: NORMAL
MDC_IDC_MSMT_CAP_CHARGE_ENERGY: 18 J
MDC_IDC_MSMT_CAP_CHARGE_TIME: 3.75
MDC_IDC_MSMT_CAP_CHARGE_TYPE: NORMAL
MDC_IDC_MSMT_LEADCHNL_RV_IMPEDANCE_VALUE: 342 OHM
MDC_IDC_MSMT_LEADCHNL_RV_IMPEDANCE_VALUE: 399 OHM
MDC_IDC_MSMT_LEADCHNL_RV_PACING_THRESHOLD_AMPLITUDE: 0.75 V
MDC_IDC_MSMT_LEADCHNL_RV_PACING_THRESHOLD_PULSEWIDTH: 0.4 MS
MDC_IDC_MSMT_LEADCHNL_RV_SENSING_INTR_AMPL: 12 MV
MDC_IDC_MSMT_LEADCHNL_RV_SENSING_INTR_AMPL: 13.5 MV
MDC_IDC_PG_IMPLANT_DTM: NORMAL
MDC_IDC_PG_MFG: NORMAL
MDC_IDC_PG_MODEL: NORMAL
MDC_IDC_PG_SERIAL: NORMAL
MDC_IDC_PG_TYPE: NORMAL
MDC_IDC_SESS_CLINIC_NAME: NORMAL
MDC_IDC_SESS_DTM: NORMAL
MDC_IDC_SESS_TYPE: NORMAL
MDC_IDC_SET_BRADY_HYSTRATE: NORMAL
MDC_IDC_SET_BRADY_LOWRATE: 40 {BEATS}/MIN
MDC_IDC_SET_BRADY_MODE: NORMAL
MDC_IDC_SET_LEADCHNL_RV_PACING_AMPLITUDE: 2 V
MDC_IDC_SET_LEADCHNL_RV_PACING_ANODE_ELECTRODE_1: NORMAL
MDC_IDC_SET_LEADCHNL_RV_PACING_ANODE_LOCATION_1: NORMAL
MDC_IDC_SET_LEADCHNL_RV_PACING_CAPTURE_MODE: NORMAL
MDC_IDC_SET_LEADCHNL_RV_PACING_CATHODE_ELECTRODE_1: NORMAL
MDC_IDC_SET_LEADCHNL_RV_PACING_CATHODE_LOCATION_1: NORMAL
MDC_IDC_SET_LEADCHNL_RV_PACING_POLARITY: NORMAL
MDC_IDC_SET_LEADCHNL_RV_PACING_PULSEWIDTH: 0.4 MS
MDC_IDC_SET_LEADCHNL_RV_SENSING_ANODE_ELECTRODE_1: NORMAL
MDC_IDC_SET_LEADCHNL_RV_SENSING_ANODE_LOCATION_1: NORMAL
MDC_IDC_SET_LEADCHNL_RV_SENSING_CATHODE_ELECTRODE_1: NORMAL
MDC_IDC_SET_LEADCHNL_RV_SENSING_CATHODE_LOCATION_1: NORMAL
MDC_IDC_SET_LEADCHNL_RV_SENSING_POLARITY: NORMAL
MDC_IDC_SET_LEADCHNL_RV_SENSING_SENSITIVITY: 0.3 MV
MDC_IDC_SET_ZONE_DETECTION_BEATS_DENOMINATOR: 40 {BEATS}
MDC_IDC_SET_ZONE_DETECTION_BEATS_NUMERATOR: 30 {BEATS}
MDC_IDC_SET_ZONE_DETECTION_INTERVAL: 270 MS
MDC_IDC_SET_ZONE_DETECTION_INTERVAL: 460 MS
MDC_IDC_SET_ZONE_DETECTION_INTERVAL: 500 MS
MDC_IDC_SET_ZONE_DETECTION_INTERVAL: NORMAL
MDC_IDC_SET_ZONE_TYPE: NORMAL
MDC_IDC_STAT_AT_BURDEN_PERCENT: 0 %
MDC_IDC_STAT_AT_DTM_END: NORMAL
MDC_IDC_STAT_AT_DTM_START: NORMAL
MDC_IDC_STAT_BRADY_DTM_END: NORMAL
MDC_IDC_STAT_BRADY_DTM_START: NORMAL
MDC_IDC_STAT_BRADY_RV_PERCENT_PACED: 23.87 %
MDC_IDC_STAT_EPISODE_RECENT_COUNT: 0
MDC_IDC_STAT_EPISODE_RECENT_COUNT_DTM_END: NORMAL
MDC_IDC_STAT_EPISODE_RECENT_COUNT_DTM_START: NORMAL
MDC_IDC_STAT_EPISODE_TOTAL_COUNT: 0
MDC_IDC_STAT_EPISODE_TOTAL_COUNT: 2
MDC_IDC_STAT_EPISODE_TOTAL_COUNT: 232
MDC_IDC_STAT_EPISODE_TOTAL_COUNT_DTM_END: NORMAL
MDC_IDC_STAT_EPISODE_TOTAL_COUNT_DTM_START: NORMAL
MDC_IDC_STAT_EPISODE_TYPE: NORMAL
MDC_IDC_STAT_TACHYTHERAPY_ATP_DELIVERED_RECENT: 0
MDC_IDC_STAT_TACHYTHERAPY_ATP_DELIVERED_TOTAL: 0
MDC_IDC_STAT_TACHYTHERAPY_RECENT_DTM_END: NORMAL
MDC_IDC_STAT_TACHYTHERAPY_RECENT_DTM_START: NORMAL
MDC_IDC_STAT_TACHYTHERAPY_SHOCKS_ABORTED_RECENT: 0
MDC_IDC_STAT_TACHYTHERAPY_SHOCKS_ABORTED_TOTAL: 0
MDC_IDC_STAT_TACHYTHERAPY_SHOCKS_DELIVERED_RECENT: 0
MDC_IDC_STAT_TACHYTHERAPY_SHOCKS_DELIVERED_TOTAL: 0
MDC_IDC_STAT_TACHYTHERAPY_TOTAL_DTM_END: NORMAL
MDC_IDC_STAT_TACHYTHERAPY_TOTAL_DTM_START: NORMAL

## 2021-08-20 RX ORDER — CEFADROXIL 500 MG/1
1000 CAPSULE ORAL 2 TIMES DAILY
Qty: 56 CAPSULE | Refills: 0 | Status: SHIPPED | OUTPATIENT
Start: 2021-08-20 | End: 2021-09-07

## 2021-08-20 NOTE — TELEPHONE ENCOUNTER
[facility] Call Center    Phone Message    May a detailed message be left on voicemail: yes     Reason for Call: Other: . Per Patient is wanting to get a call back. Patient states had quite a bit of leakage from his line and that is was oozing and had redness. Patient states had a culture done and has not heard anything about the outcome. Patient is wanting to know if Dr. Bhatti is wanting patient to be on more antibiotics. Please advise.     Action Taken: Message routed to:  [facility] ID    Travel Screening: Not Applicable

## 2021-08-20 NOTE — TELEPHONE ENCOUNTER
Called patient- Dr. Bhatti to change antibiotic to Cefadroxil 1gram BID for approximately two weeks- will schedule pt 8/26/21 noon for virtual check in. Pt aware and agreeable to plan.

## 2021-08-20 NOTE — TELEPHONE ENCOUNTER
Confirmed with Veronica this is a medication change. States she will not have it in until Monday. Per Dr. Bhatti, maintain cephalexin 500mg but increase to 4x/day.   Pt verbalized understanding of these instructions.

## 2021-08-20 NOTE — TELEPHONE ENCOUNTER
[facility] Call Center    Phone Message    May a detailed message be left on voicemail: yes     Reason for Call: Medication Question or concern regarding medication   Prescription Clarification    Name of Medication:   * cefadroxil (DURICEF) 500 MG capsule    Prescribing Provider: Magy     [id number] - [address]     What on the order needs clarification? Per Veronica states they had received a request for the medication. Veronica states they do not have the medication and wondering if this is a medication change. Veronica states patient had put in a request for Cephalexin. Please advise.       Action Taken: Message routed to:  [facility] ID    Travel Screening: Not Applicable

## 2021-08-22 LAB — BACTERIA BLD CULT: NO GROWTH

## 2021-08-23 LAB — BACTERIA BLD CULT: NO GROWTH

## 2021-08-24 ENCOUNTER — ANTICOAGULATION THERAPY VISIT (OUTPATIENT)
Dept: ANTICOAGULATION | Facility: CLINIC | Age: 66
End: 2021-08-24

## 2021-08-24 DIAGNOSIS — Z95.811 LVAD (LEFT VENTRICULAR ASSIST DEVICE) PRESENT (H): Primary | ICD-10-CM

## 2021-08-24 DIAGNOSIS — I48.0 PAROXYSMAL ATRIAL FIBRILLATION (H): ICD-10-CM

## 2021-08-24 DIAGNOSIS — I50.22 CHRONIC SYSTOLIC CONGESTIVE HEART FAILURE (H): ICD-10-CM

## 2021-08-24 LAB
BACTERIA WND CULT: NORMAL
INR (EXTERNAL): 2.3 (ref 0.9–1.1)

## 2021-08-24 NOTE — PROGRESS NOTES
ANTICOAGULATION MANAGEMENT     Eliseo Tanner 66 year old male is on warfarin with therapeutic INR result. (Goal INR 1.8-2.3)    Recent labs: (last 7 days)     08/24/21  1313   INR 2.3*       ASSESSMENT     Source(s): Chart Review and Patient/Caregiver Call       Warfarin doses taken: Warfarin taken as instructed    Diet: No new diet changes identified    New illness, injury, or hospitalization: No    Medication/supplement changes: None noted    Signs or symptoms of bleeding or clotting: No    Previous INR: Therapeutic last 2(+) visits    Additional findings: None     PLAN     Recommended plan for no diet, medication or health factor changes affecting INR     Dosing Instructions: Continue your current warfarin dose with next INR in 2 weeks       Summary  As of 8/24/2021    Full warfarin instructions:  2 mg every Tue, Fri; 4 mg all other days   Next INR check:  9/7/2021             Telephone call with  Patient's wife, Veronique who agrees to plan and repeated back plan correctly    Patient using outside facility for labs    Education provided: None required    Plan made per [facility] anticoagulation protocol and per LVAD protocol    Young Bustos, RN  Anticoagulation Clinic  8/24/2021    _______________________________________________________________________     Anticoagulation Episode Summary     Current INR goal:  2.0-3.0   TTR:  88.2 % (10.6 mo)   Target end date:  Indefinite   Send INR reminders to:  [facility]    Indications    LVAD (left ventricular assist device) present (H) [Z95.811]  Chronic systolic congestive heart failure (H) [I50.22]  Paroxysmal atrial fibrillation (H) [I48.0]           Comments:  INR GOAL RANGE CHANGE 8/17/21 1.8-2.3 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 HM 3  placed 2/18/2020, 81mg ASA, Speak to spouseVeronique at [phone] 4/14/2020:  Lab: [facility]:(p)[phone] (f) [phone]         Anticoagulation Care Providers     Provider  Role Specialty Phone number    Nader Cisneros MD Referring Advanced Heart Failure and Transplant Cardiology [phone]

## 2021-08-26 ENCOUNTER — VIRTUAL VISIT (OUTPATIENT)
Dept: INFECTIOUS DISEASES | Facility: CLINIC | Age: 66
End: 2021-08-26
Attending: INTERNAL MEDICINE
Payer: MEDICARE

## 2021-08-26 DIAGNOSIS — T82.7XXA INFECTION ASSOCIATED WITH DRIVELINE OF LEFT VENTRICULAR ASSIST DEVICE (LVAD) (H): Primary | ICD-10-CM

## 2021-08-26 PROCEDURE — 99442 PR PHYSICIAN TELEPHONE EVALUATION 11-20 MIN: CPT | Mod: 95 | Performed by: INTERNAL MEDICINE

## 2021-08-26 NOTE — PATIENT INSTRUCTIONS
Continue cefadroxil 2 grams BID for a total of 14 days (started 8/24)  Please notify me how the drainage is at the end of the 14 days. Once resolved or significantly improved will transition to cefadroxil 500 mg BID suppression  Follow up 3 month

## 2021-08-26 NOTE — PROGRESS NOTES
Eliseo is a 66 year old who is being evaluated via a billable telephone visit.      What phone number would you like to be contacted at? [phone]  How would you like to obtain your AVS? Maria C  Phone call duration: 15 minutes    [facility]  Infectious Disease Outpatient Follow up Note     Patient:  Eliseo Tanner, Date of birth 1955  Medical record number [id number]  Date of Visit:  08/26/2021       Problem List:  1. Chronic MSSA LVAD infection  2. 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 Severe Legionella pneumonia serogroup 1: Urine antigen positive, sputum PCR positive plus pulmonary opacities. s/p azithromycin (completed 2/25)  4. CKD (crcl ~55-65)  5. History of NICM s/p HM III (2/18/20), ICD placement (3/16/20)  6. 1st dose of the Moderna covid 19 vaccine on 2/11    Assessment:  66 y/o M w/ NICM s/p HM III and ICD placement, chronic MSSA driveline infection c/b MSSA bacteremia 11/2020 on cephalexin suppression;  severe Legionella pneumonia c/b cardiogenic shock, oliguric renal failure requiring CRRT and respiratory decompensation requiring intubation. He had been on cephalexin suppression for chronic LVAD infection but developed increased drainage ~ 8/14. Increased to treatment doses of cephalexin and then transitioned to cefadroxil for ease of administration. After the changes the drainage has decreased and there is not erythema around the drive line. He plans to receive the 2nd dose of the covid 19 vaccination next week.     Recommendations:  1. Continue cefadroxil 2 grams BID for a total of 14 days (started 8/24)  2. Eliseo will notify me how the drainage is at the end of the 14 days. Once resolved or significantly improved will transition to cefadroxil 500 mg BID suppression  3. Follow up 3 months    30 minutes spent on the date of the encounter doing chart review, review of test results, interpretation of tests, patient  visit and documentation     Negar Bhatti DO.   Infectious Diseases  Pager [phone]        Interval History:   ~8/14 started to have yellow-brown and thick drainage. No odor associated with it. 8/17 culture grew MSSA. The drainage became worse after 8/17. The drive line did not appear red. Increased cephalexin to QID on 8/21. Transitioned to cefadroxil 2 grams BID on 8/24.  Drainage amount has improved. Last nights dressing change showed that drainage was scant. Not as thick as it had been. Denies subjective fevers, chills. Otherwise has felt okay.     ID History:  Chronic MSSA driveline infection c/b MSSA bacteremia 11/2020 on cephalexin suppression. After ebing started on suppression he developed acute on chronic infection and received cefazolin from 2/15-3/1 for acute on chronic MSSA driveline infection. 2/15 CT C/A/P revealed that there was trace fluid around the LVAD with some mild surrounding inflammation within the subq tissue fat and superior right rectus abdominal muscle.  In attempt to reduce the bacterial burden cephalexin was changed to IV cefazolin and he received cefazolin for about 2 weeks.  After this he was transitioned to 4 times a day cephalexin. On cephalexin 500 mg BID for suppression.     Hospitalized 2/15-3/17/21 for Legionella pneumonia.  Developed cardiogenic shock, oliguric renal failure requiring CRRT and respiratory decompensation requiring intubation.       Review of Systems:Remaining systems all reviewed and negative.           Medications & Allergies:     Current Outpatient Medications   Medication     allopurinol (ZYLOPRIM) 100 MG tablet     amiodarone (PACERONE) 200 MG tablet     amLODIPine (NORVASC) 5 MG tablet     aspirin (ASA) 81 MG EC tablet     atorvastatin (LIPITOR) 20 MG tablet     B Complex-C-Folic Acid (VIRT-CAPS) 1 MG CAPS     bumetanide (BUMEX) 1 MG tablet     cefadroxil (DURICEF) 500 MG capsule     co-enzyme Q-10 200 MG CAPS     finasteride (PROSCAR) 5 MG tablet      FLUoxetine (PROZAC) 10 MG capsule     hydrALAZINE (APRESOLINE) 50 MG tablet     insulin glargine (BASAGLAR KWIKPEN) 100 UNIT/ML pen     insulin pen needle (BD JAIME U/F) 32G X 4 MM miscellaneous     magnesium oxide (MAG-OX) 400 MG tablet     omeprazole (PRILOSEC) 20 MG DR capsule     potassium chloride ER (KLOR-CON M) 20 MEQ CR tablet     tamsulosin (FLOMAX) 0.4 MG capsule     warfarin ANTICOAGULANT (COUMADIN) 2 MG tablet     warfarin ANTICOAGULANT (COUMADIN) 4 MG tablet     zolpidem (AMBIEN) 5 MG tablet     nitroGLYcerin (NITROSTAT) 0.4 MG sublingual tablet     senna-docusate (SENOKOT-S/PERICOLACE) 8.6-50 MG tablet     No current facility-administered medications for this visit.         Allergies   Allergen Reactions     Heparin      HIT screen positive 2/14/20, reflex DAVINA negative; however heme recommended treating as if positive  HIT screen negative 2/11/20     Oxycodone Itching and Other (See Comments)     Chlorhexidine Rash            Physical Exam:        There were no vitals filed for this visit.-telephone visit    GENERAL: alert and no distress. Completes full sentences.   RESP: No audible wheeze, cough  PSYCH:  judgement and insight intact, normal speech         Laboratory Data:   Metabolic Studies       Recent Labs   Lab Test 08/17/21  0807 04/09/21  0000 03/17/21  0613 02/24/21  1855 02/24/21  0442 02/23/21  2130 02/22/21  2140 02/22/21  1539 02/15/21  2127 02/15/21  1910 01/06/21  0539 02/13/20  0219 02/13/20  0206    139 137  --  132*  --    < >  --    < > 129* 135   < > 132*   POTASSIUM 3.8 4.2 3.9  --  4.1  --    < >  --    < > 5.1 4.3   < > 4.1  4.2   CHLORIDE 106 103 102  --  99  --    < >  --    < > 97 102   < > 96   CO2 29 28 29  --  25  --    < >  --    < > 16* 23   < > 22   ANIONGAP 7 8 6  --  8  --    < >  --    < > 17* 10   < > 14   BUN 33* 35 69*  --  44*  --    < >  --    < > 78* 105*   < > 34*   CR 1.55* 1.6 2.00*  --  3.84*  --    < >  --    < > 3.23* 2.85*   < > 1.58*   GFRESTIMATED  45*  --  33*  --  15*  --    < >  --    < > 19* 22*   < > 45*   * 121* 119*  --  125*  --    < >  --    < > 215* 145*   < > 154*   A1C  --   --   --   --   --   --   --   --   --   --  6.8*  --   --    CALOS 8.9 9.0 8.3*  --  8.0*  --    < >  --    < > 8.7 8.7   < > 8.9   PHOS  --   --   --   --   --   --   --  5.2*   < > 5.0*  --    < > 5.4*   MAG  --   --  1.5*   < >  --   --   --  2.6*   < > 2.1 2.3   < > 2.0   LACT  --   --   --   --  0.7 1.4  --  0.9  --  6.9*  --   --  9.5*   PCAL  --   --   --   --   --   --   --   --   --  7.95*  --   --   --    CKT  --   --   --   --   --   --   --   --   --  884*  --   --  39    < > = values in this interval not displayed.       Hepatic Studies    Recent Labs   Lab Test 08/17/21 0807 06/25/21  0000 03/19/21  0000 03/15/21  0551 11/15/20  0830 11/14/20  2041   BILITOTAL 0.6  --  0.5 0.4   < > 0.7   DBIL  --   --   --   --   --  0.4*   ALKPHOS 125  --  138.0 121   < > 184*   PROTTOTAL 8.2  --  8.8 7.7   < > 7.8   ALBUMIN 3.8  --  4.4 2.9*   < > 2.6*   AST 40  --  60.0 39   < > 48*   ALT 46  --  35 29   < > 56   * 301 336 283*  --   --     < > = values in this interval not displayed.       Hematology Studies      Recent Labs   Lab Test 08/17/21  0807 03/19/21  0000 03/17/21  0613 03/16/21  0551 03/15/21  0551 03/14/21  0459 03/07/21  0543 03/06/21  0430 11/05/20  0000   WBC 6.0 7.9 6.6 6.5 6.3 5.9 4.5 4.4  --    ANEU  --   --   --   --   --   --  2.6 2.8  --    ALYM  --   --   --   --   --   --  0.9 0.7*  --    GIULIANO  --   --   --   --   --   --  0.7 0.6  --    AEOS  --   --   --   --   --   --  0.3 0.2  --    HGB 12.7* 10.1* 9.0* 8.9* 8.7* 8.5* 7.9* 8.0*   < >   HCT 40.6 32.2 29.0* 29.0* 28.8* 28.5* 25.9* 25.3*   < >    351 268 266 275 259 208 196  --     < > = values in this interval not displayed.       Body fluid stats    Recent Labs   Lab Test 02/15/21  2236   GS Rare  Gram positive cocci  *  Few  WBC'S seen         Microbiology:  8/17/21 drive line  culture: 1+ MSSA  8/17/21 dlood cx negative    Culture Micro   Date Value Ref Range Status   02/17/2021 No growth  Final   02/17/2021 No growth  Final   02/16/2021 Light growth  Enterococcus faecium   (A)  Final   02/16/2021 (A)  Final    Legionella pneumophila  isolated  Sent to [facility] for serotyping     02/16/2021   Final    Critical Value/Significant Value, preliminary result only, called to and read back by  Shobha Pedro RN on 2.23.21 at 1401. bw     02/16/2021 (A)  Final    Report received from the [place] Dept. of Health:  Legionella pneumophila serogroup 1  This test was developed and its performance characteristics determined by the [facility].  It has not been cleared or approved by the U.S. Food and Drug   Administration:21CFR 809.30(e).  The FDA has determined that such clearance is not   necessary.     02/15/2021 Moderate growth  Staphylococcus aureus   (A)  Final   02/15/2021 Moderate growth  Strain 2  Staphylococcus aureus   (A)  Final   02/15/2021 Moderate growth  Strain 3  Staphylococcus aureus   (A)  Final   02/15/2021 Light growth  Normal skin freeman    Final   02/15/2021 (A)  Final    Canceled, Test credited  >10 Squamous epithelial cells/low power field indicates oral contamination. Please   recollect.  Notification of test cancellation was given to  Bethanie Murillo RN on 2.15.21 at 2326. JRT     02/15/2021 No growth  Final   02/15/2021 No growth  Final   02/08/2021 No growth  Final   02/08/2021 No growth  Final   02/08/2021 Moderate growth  Staphylococcus aureus   (A)  Final   02/08/2021 Moderate growth  Strain 2  Staphylococcus aureus   (A)  Final   02/08/2021 No anaerobes isolated  Final   12/17/2020 No anaerobes isolated  Final   12/17/2020 Light growth  Staphylococcus aureus   (A)  Final         Last check of C difficile  C Diff Toxin B PCR   Date Value Ref Range Status   11/15/2020 Negative NEG^Negative Final     Comment:     Negative: C. difficile target DNA sequences NOT  detected, presumed negative   for C.difficile toxin B or the number of bacteria present may be below the   limit of detection for the test.  FDA approved assay performed using [employer] GeneXpert real-time PCR.  A negative result does not exclude actual disease due to C. difficile and may   be due to improper collection, handling and storage of the specimen or the   number of organisms in the specimen is below the detection limit of the assay.         Urine Studies     Recent Labs   Lab Test 02/16/21  0225 11/17/20  0825 02/22/20  0944 02/13/20  0334 02/07/20 2029   URINEPH 5.5 5.5 5.5 6.0 5.0   NITRITE Negative Negative Negative Negative Negative   LEUKEST Negative Negative Small* Small* Negative   WBCU 0 0 2 81* 3       Imaging:  Cardiac Device Check - In Clinic  Result Date: 8/20/2021  Patient seen in the [facility] for evaluation and iterative programming of their ICD per MD orders. Device: Medtronic [id number] Visia AF MRI VR Normal device function. Mode: VVI 40 bpm : 23.9% Intrinsic rhythm: Regular VS @ 64 bpm Thoracic Impedance: Near baseline. Short V-V intervals: 0 Lead Trends Appear Stable Estimated battery longevity to RRT = 10.7 years. Anticoagulant: warfarin Ventricular Arrhythmia: None Setting Changes: None Patient has an appointment to see Dr. Cisneros today. Plan: Send a remote transmission in 3 months and RTC in 6 months. Tim RN Single lead ICD I have reviewed and interpreted the device interrogation, settings, programming and nurse's summary. The device is functioning within normal device parameters. I agree with the current findings, assessment and plan.    2/16/21 CT C/A/P:   1. Overall stable patchy consolidative pulmonary opacities, concerning  for pneumonia.  2. Stable prominent and borderline enlarged mediastinal lymph nodes,  favored to be reactive.  3. Mild increase in small bilateral pleural effusions, greater on the  right, with associated compressive atelectasis.  4. Overall stable LVAD drive  line appearance in the superior abdominal  wall without discrete fluid collection or significant inflammation.  5. Mild increase in diffuse intra-abdominal ascites.

## 2021-08-26 NOTE — LETTER
8/26/2021       RE: Eliseo Tanner  [address]  [facility] [address]     Dear Colleague,    Thank you for referring your patient, Eliseo Tanner, to the [facility] INFECTIOUS DISEASE CLINIC [place] at [facility]. Please see a copy of my visit note below.    Eliseo is a 66 year old who is being evaluated via a billable telephone visit.      What phone number would you like to be contacted at? [phone]  How would you like to obtain your AVS? MyChart  Phone call duration: 15 minutes    [facility]  Infectious Disease Outpatient Follow up Note     Patient:  Eliseo Tanner, Date of birth 1955  Medical record number [id number]  Date of Visit:  08/26/2021       Problem List:  1. Chronic MSSA LVAD infection  2. 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 Severe Legionella pneumonia serogroup 1: Urine antigen positive, sputum PCR positive plus pulmonary opacities. s/p azithromycin (completed 2/25)  4. CKD (crcl ~55-65)  5. History of NICM s/p HM III (2/18/20), ICD placement (3/16/20)  6. 1st dose of the Moderna covid 19 vaccine on 2/11    Assessment:  64 y/o M w/ NICM s/p HM III and ICD placement, chronic MSSA driveline infection c/b MSSA bacteremia 11/2020 on cephalexin suppression;  severe Legionella pneumonia c/b cardiogenic shock, oliguric renal failure requiring CRRT and respiratory decompensation requiring intubation. He had been on cephalexin suppression for chronic LVAD infection but developed increased drainage ~ 8/14. Increased to treatment doses of cephalexin and then transitioned to cefadroxil for ease of administration. After the changes the drainage has decreased and there is not erythema around the drive line. He plans to receive the 2nd dose of the covid 19 vaccination next week.     Recommendations:  1. Continue cefadroxil 2 grams BID for a total of 14 days  (started 8/24)  2. Eliseo will notify me how the drainage is at the end of the 14 days. Once resolved or significantly improved will transition to cefadroxil 500 mg BID suppression  3. Follow up 3 months    30 minutes spent on the date of the encounter doing chart review, review of test results, interpretation of tests, patient visit and documentation     Negar Bhatti DO.   Infectious Diseases  Pager [phone]        Interval History:   ~8/14 started to have yellow-brown and thick drainage. No odor associated with it. 8/17 culture grew MSSA. The drainage became worse after 8/17. The drive line did not appear red. Increased cephalexin to QID on 8/21. Transitioned to cefadroxil 2 grams BID on 8/24.  Drainage amount has improved. Last nights dressing change showed that drainage was scant. Not as thick as it had been. Denies subjective fevers, chills. Otherwise has felt okay.     ID History:  Chronic MSSA driveline infection c/b MSSA bacteremia 11/2020 on cephalexin suppression. After ebing started on suppression he developed acute on chronic infection and received cefazolin from 2/15-3/1 for acute on chronic MSSA driveline infection. 2/15 CT C/A/P revealed that there was trace fluid around the LVAD with some mild surrounding inflammation within the subq tissue fat and superior right rectus abdominal muscle.  In attempt to reduce the bacterial burden cephalexin was changed to IV cefazolin and he received cefazolin for about 2 weeks.  After this he was transitioned to 4 times a day cephalexin. On cephalexin 500 mg BID for suppression.     Hospitalized 2/15-3/17/21 for Legionella pneumonia.  Developed cardiogenic shock, oliguric renal failure requiring CRRT and respiratory decompensation requiring intubation.       Review of Systems:Remaining systems all reviewed and negative.           Medications & Allergies:     Current Outpatient Medications   Medication     allopurinol (ZYLOPRIM) 100 MG tablet     amiodarone  (PACERONE) 200 MG tablet     amLODIPine (NORVASC) 5 MG tablet     aspirin (ASA) 81 MG EC tablet     atorvastatin (LIPITOR) 20 MG tablet     B Complex-C-Folic Acid (VIRT-CAPS) 1 MG CAPS     bumetanide (BUMEX) 1 MG tablet     cefadroxil (DURICEF) 500 MG capsule     co-enzyme Q-10 200 MG CAPS     finasteride (PROSCAR) 5 MG tablet     FLUoxetine (PROZAC) 10 MG capsule     hydrALAZINE (APRESOLINE) 50 MG tablet     insulin glargine (BASAGLAR KWIKPEN) 100 UNIT/ML pen     insulin pen needle (BD JAIME U/F) 32G X 4 MM miscellaneous     magnesium oxide (MAG-OX) 400 MG tablet     omeprazole (PRILOSEC) 20 MG DR capsule     potassium chloride ER (KLOR-CON M) 20 MEQ CR tablet     tamsulosin (FLOMAX) 0.4 MG capsule     warfarin ANTICOAGULANT (COUMADIN) 2 MG tablet     warfarin ANTICOAGULANT (COUMADIN) 4 MG tablet     zolpidem (AMBIEN) 5 MG tablet     nitroGLYcerin (NITROSTAT) 0.4 MG sublingual tablet     senna-docusate (SENOKOT-S/PERICOLACE) 8.6-50 MG tablet     No current facility-administered medications for this visit.         Allergies   Allergen Reactions     Heparin      HIT screen positive 2/14/20, reflex DAVINA negative; however heme recommended treating as if positive  HIT screen negative 2/11/20     Oxycodone Itching and Other (See Comments)     Chlorhexidine Rash            Physical Exam:        There were no vitals filed for this visit.-telephone visit    GENERAL: alert and no distress. Completes full sentences.   RESP: No audible wheeze, cough  PSYCH:  judgement and insight intact, normal speech         Laboratory Data:   Metabolic Studies       Recent Labs   Lab Test 08/17/21  0807 04/09/21  0000 03/17/21  0613 02/24/21  1855 02/24/21  0442 02/23/21  2130 02/22/21  2140 02/22/21  1539 02/15/21  2127 02/15/21  1910 01/06/21  0539 02/13/20  0219 02/13/20  0206    139 137  --  132*  --    < >  --    < > 129* 135   < > 132*   POTASSIUM 3.8 4.2 3.9  --  4.1  --    < >  --    < > 5.1 4.3   < > 4.1  4.2   CHLORIDE 106 103  102  --  99  --    < >  --    < > 97 102   < > 96   CO2 29 28 29  --  25  --    < >  --    < > 16* 23   < > 22   ANIONGAP 7 8 6  --  8  --    < >  --    < > 17* 10   < > 14   BUN 33* 35 69*  --  44*  --    < >  --    < > 78* 105*   < > 34*   CR 1.55* 1.6 2.00*  --  3.84*  --    < >  --    < > 3.23* 2.85*   < > 1.58*   GFRESTIMATED 45*  --  33*  --  15*  --    < >  --    < > 19* 22*   < > 45*   * 121* 119*  --  125*  --    < >  --    < > 215* 145*   < > 154*   A1C  --   --   --   --   --   --   --   --   --   --  6.8*  --   --    CALOS 8.9 9.0 8.3*  --  8.0*  --    < >  --    < > 8.7 8.7   < > 8.9   PHOS  --   --   --   --   --   --   --  5.2*   < > 5.0*  --    < > 5.4*   MAG  --   --  1.5*   < >  --   --   --  2.6*   < > 2.1 2.3   < > 2.0   LACT  --   --   --   --  0.7 1.4  --  0.9  --  6.9*  --   --  9.5*   PCAL  --   --   --   --   --   --   --   --   --  7.95*  --   --   --    CKT  --   --   --   --   --   --   --   --   --  884*  --   --  39    < > = values in this interval not displayed.       Hepatic Studies    Recent Labs   Lab Test 08/17/21  0807 06/25/21  0000 03/19/21  0000 03/15/21  0551 11/15/20  0830 11/14/20  2041   BILITOTAL 0.6  --  0.5 0.4   < > 0.7   DBIL  --   --   --   --   --  0.4*   ALKPHOS 125  --  138.0 121   < > 184*   PROTTOTAL 8.2  --  8.8 7.7   < > 7.8   ALBUMIN 3.8  --  4.4 2.9*   < > 2.6*   AST 40  --  60.0 39   < > 48*   ALT 46  --  35 29   < > 56   * 301 336 283*  --   --     < > = values in this interval not displayed.       Hematology Studies      Recent Labs   Lab Test 08/17/21  0807 03/19/21  0000 03/17/21  0613 03/16/21  0551 03/15/21  0551 03/14/21  0459 03/07/21  0543 03/06/21  0430 11/05/20  0000   WBC 6.0 7.9 6.6 6.5 6.3 5.9 4.5 4.4  --    ANEU  --   --   --   --   --   --  2.6 2.8  --    ALYM  --   --   --   --   --   --  0.9 0.7*  --    GIULIANO  --   --   --   --   --   --  0.7 0.6  --    AEOS  --   --   --   --   --   --  0.3 0.2  --    HGB 12.7* 10.1* 9.0* 8.9*  8.7* 8.5* 7.9* 8.0*   < >   HCT 40.6 32.2 29.0* 29.0* 28.8* 28.5* 25.9* 25.3*   < >    351 268 266 275 259 208 196  --     < > = values in this interval not displayed.       Body fluid stats    Recent Labs   Lab Test 02/15/21  2236   GS Rare  Gram positive cocci  *  Few  WBC'S seen         Microbiology:  8/17/21 drive line culture: 1+ MSSA  8/17/21 dlood cx negative    Culture Micro   Date Value Ref Range Status   02/17/2021 No growth  Final   02/17/2021 No growth  Final   02/16/2021 Light growth  Enterococcus faecium   (A)  Final   02/16/2021 (A)  Final    Legionella pneumophila  isolated  Sent to [facility] for serotyping     02/16/2021   Final    Critical Value/Significant Value, preliminary result only, called to and read back by  Shobha Pedro RN on 2.23.21 at 1401. bw     02/16/2021 (A)  Final    Report received from the [place] Dept. of Health:  Legionella pneumophila serogroup 1  This test was developed and its performance characteristics determined by the [facility].  It has not been cleared or approved by the U.S. Food and Drug   Administration:21CFR 809.30(e).  The FDA has determined that such clearance is not   necessary.     02/15/2021 Moderate growth  Staphylococcus aureus   (A)  Final   02/15/2021 Moderate growth  Strain 2  Staphylococcus aureus   (A)  Final   02/15/2021 Moderate growth  Strain 3  Staphylococcus aureus   (A)  Final   02/15/2021 Light growth  Normal skin freeman    Final   02/15/2021 (A)  Final    Canceled, Test credited  >10 Squamous epithelial cells/low power field indicates oral contamination. Please   recollect.  Notification of test cancellation was given to  Bethanie Murillo RN on 2.15.21 at 2326. JRT     02/15/2021 No growth  Final   02/15/2021 No growth  Final   02/08/2021 No growth  Final   02/08/2021 No growth  Final   02/08/2021 Moderate growth  Staphylococcus aureus   (A)  Final   02/08/2021 Moderate growth  Strain 2  Staphylococcus aureus   (A)  Final    02/08/2021 No anaerobes isolated  Final   12/17/2020 No anaerobes isolated  Final   12/17/2020 Light growth  Staphylococcus aureus   (A)  Final         Last check of C difficile  C Diff Toxin B PCR   Date Value Ref Range Status   11/15/2020 Negative NEG^Negative Final     Comment:     Negative: C. difficile target DNA sequences NOT detected, presumed negative   for C.difficile toxin B or the number of bacteria present may be below the   limit of detection for the test.  FDA approved assay performed using [employer] real-time PCR.  A negative result does not exclude actual disease due to C. difficile and may   be due to improper collection, handling and storage of the specimen or the   number of organisms in the specimen is below the detection limit of the assay.         Urine Studies     Recent Labs   Lab Test 02/16/21  0225 11/17/20  0825 02/22/20  0944 02/13/20  0334 02/07/20 2029   URINEPH 5.5 5.5 5.5 6.0 5.0   NITRITE Negative Negative Negative Negative Negative   LEUKEST Negative Negative Small* Small* Negative   WBCU 0 0 2 81* 3       Imaging:  Cardiac Device Check - In Clinic  Result Date: 8/20/2021  [place] [facility] [place] and iterative programming of their ICD per MD orders. Device: Medtronic [id number] Visia AF MRI VR Normal device function. Mode: VVI 40 bpm : 23.9% Intrinsic rhythm: Regular VS @ 64 bpm Thoracic Impedance: Near baseline. Short V-V intervals: 0 Lead Trends Appear Stable Estimated battery longevity to RRT = 10.7 years. Anticoagulant: warfarin Ventricular Arrhythmia: None Setting Changes: None Patient has an appointment to see Dr. Cisneros today. Plan: Send a remote transmission in 3 months and RTC in 6 months. KRISTEN Dumont Single lead ICD I have reviewed and interpreted the device interrogation, settings, programming and nurse's summary. The device is functioning within normal device parameters. I agree with the current findings, assessment and plan.    2/16/21 CT  C/A/P:   1. Overall stable patchy consolidative pulmonary opacities, concerning  for pneumonia.  2. Stable prominent and borderline enlarged mediastinal lymph nodes,  favored to be reactive.  3. Mild increase in small bilateral pleural effusions, greater on the  right, with associated compressive atelectasis.  4. Overall stable LVAD drive line appearance in the superior abdominal  wall without discrete fluid collection or significant inflammation.  5. Mild increase in diffuse intra-abdominal ascites.

## 2021-08-31 ENCOUNTER — TELEPHONE (OUTPATIENT)
Dept: INFECTIOUS DISEASES | Facility: CLINIC | Age: 66
End: 2021-08-31

## 2021-08-31 ENCOUNTER — CARE COORDINATION (OUTPATIENT)
Dept: CARDIOLOGY | Facility: CLINIC | Age: 66
End: 2021-08-31

## 2021-08-31 NOTE — TELEPHONE ENCOUNTER
[facility] Call Center    Phone Message    May a detailed message be left on voicemail: yes     Reason for Call: Other: Pt's wife called and would like a call back from Nurse regarding Covid shot.  Please call back whenever possible- the sooner the better, per pt wife.     Action Taken: Message routed to:  [facility] ([facility] ID    Travel Screening: Not Applicable

## 2021-08-31 NOTE — PROGRESS NOTES
D:  Received call from pt's wife regarding questions about COVID vaccine.  I:  Referred pt to PCP to answer vaccine questions.  A:  Pt's wife verbalized understanding.

## 2021-09-07 ENCOUNTER — ANTICOAGULATION THERAPY VISIT (OUTPATIENT)
Dept: ANTICOAGULATION | Facility: CLINIC | Age: 66
End: 2021-09-07

## 2021-09-07 DIAGNOSIS — I50.22 CHRONIC SYSTOLIC CONGESTIVE HEART FAILURE (H): ICD-10-CM

## 2021-09-07 DIAGNOSIS — I48.0 PAROXYSMAL ATRIAL FIBRILLATION (H): ICD-10-CM

## 2021-09-07 DIAGNOSIS — T82.7XXA INFECTION ASSOCIATED WITH DRIVELINE OF LEFT VENTRICULAR ASSIST DEVICE (LVAD) (H): ICD-10-CM

## 2021-09-07 DIAGNOSIS — Z95.811 LVAD (LEFT VENTRICULAR ASSIST DEVICE) PRESENT (H): Primary | ICD-10-CM

## 2021-09-07 LAB — INR (EXTERNAL): 2.4 (ref 0.9–1.1)

## 2021-09-07 RX ORDER — CEFADROXIL 500 MG/1
500 CAPSULE ORAL 2 TIMES DAILY
Qty: 56 CAPSULE | Refills: 3 | Status: SHIPPED | OUTPATIENT
Start: 2021-09-07 | End: 2021-12-28

## 2021-09-07 NOTE — PROGRESS NOTES
ANTICOAGULATION MANAGEMENT     Eliseo Tanner 66 year old male is on warfarin with supratherapeutic INR result. (Goal INR 1.8-2.3)    Recent labs: (last 7 days)     09/07/21  0000   INR 2.4*       ASSESSMENT     Source(s): Patient/Caregiver Call       Warfarin doses taken: Warfarin taken as instructed    Diet: No new diet changes identified    New illness, injury, or hospitalization: No    Medication/supplement changes: None noted    Signs or symptoms of bleeding or clotting: No    Previous INR: Therapeutic last visit; previously outside of goal range    Additional findings: None     PLAN     Recommended plan for no diet, medication or health factor changes affecting INR     Dosing Instructions:  Decrease your warfarin dose (8.3% change) with next INR in 2 weeks Spouse Veronique prefers to have pt check an INR in 2 weeks    Summary  As of 9/7/2021    Full warfarin instructions:  2 mg every Tue, Thu, Sat; 4 mg all other days   Next INR check:               Telephone call with  Spouse Veronique who verbalizes understanding and agrees to plan and who agrees to plan and repeated back plan correctly    Patient using outside facility for labs    Education provided: Vitamin K content of foods Pt eats an avocado and writer let Veronique know this does contain vitamin K.    Plan made with [facility] Pharmacist Sarah Gaffney and per LVAD protocol    Luiza Perkins, RN  Anticoagulation Clinic  9/7/2021    _______________________________________________________________________     Anticoagulation Episode Summary     Current INR goal:  2.0-3.0   TTR:  88.2 % (10.6 mo)   Target end date:  Indefinite   Send INR reminders to:  [facility]    Indications    LVAD (left ventricular assist device) present (H) [Z95.811]  Chronic systolic congestive heart failure (H) [I50.22]  Paroxysmal atrial fibrillation (H) [I48.0]           Comments:  INR GOAL RANGE CHANGE 8/17/21 1.8-2.3 On Amiodarone ALLERGIC TO HEPARIN (History of HIT)drinks 2-3  boosts/week. HM 3  placed 2/18/2020, 81mg ASA, Speak to spouse, Veronique at [phone] 4/14/2020:  Lab: [facility]:(p)[phone] (f) [phone]         Anticoagulation Care Providers     Provider Role Specialty Phone number    Nader Cisneros MD Referring Advanced Heart Failure and Transplant Cardiology [phone]

## 2021-09-08 NOTE — TELEPHONE ENCOUNTER
Called pt 9/7 regarding separate issues, asked about this as well- states he received his third immunization last week. Disregard this.

## 2021-09-15 ENCOUNTER — CARE COORDINATION (OUTPATIENT)
Dept: CARDIOLOGY | Facility: CLINIC | Age: 66
End: 2021-09-15

## 2021-09-21 ENCOUNTER — CARE COORDINATION (OUTPATIENT)
Dept: CARDIOLOGY | Facility: CLINIC | Age: 66
End: 2021-09-21

## 2021-09-21 LAB — INR (EXTERNAL): 1.4 (ref 0.9–1.1)

## 2021-09-21 NOTE — PROGRESS NOTES
D:  Pt called to report some new bleeding at Eliseo's driveline exit site.    Pt reports that he was lifting a ladder today and thinks he may have over done it.  He doesn't have any tenderness, and it's only been bloody drainage.  No fevers, chills, or change in LVAD parameters.  I:  Encouraged pt to monitor the site for changes. Advised pt to use daily dressings until output has stopped, and to call if symptoms worsen.  A:  Drainage   P:  Pt verbalized understanding of the instructions given.  Will call VAD coordinator with further needs and questions.

## 2021-09-21 NOTE — PROGRESS NOTES
Called pt to follow up to bloody drainage he was having.  Pt reports resolution of bloody drainage, and the site looks like it's back to normal.  Pt will continue to monitor for changes and will call coordinator prn.

## 2021-09-22 ENCOUNTER — ANTICOAGULATION THERAPY VISIT (OUTPATIENT)
Dept: ANTICOAGULATION | Facility: CLINIC | Age: 66
End: 2021-09-22

## 2021-09-22 DIAGNOSIS — I50.22 CHRONIC SYSTOLIC CONGESTIVE HEART FAILURE (H): ICD-10-CM

## 2021-09-22 DIAGNOSIS — Z95.811 LVAD (LEFT VENTRICULAR ASSIST DEVICE) PRESENT (H): Primary | ICD-10-CM

## 2021-09-22 DIAGNOSIS — I48.0 PAROXYSMAL ATRIAL FIBRILLATION (H): ICD-10-CM

## 2021-09-22 NOTE — PROGRESS NOTES
ANTICOAGULATION MANAGEMENT     Eliseo Tanner 66 year old male is on warfarin with subtherapeutic INR result. (Goal INR 1.8-2.3)    Recent labs: (last 7 days)     09/21/21  0000   INR 1.4*       ASSESSMENT     Source(s): Patient/Caregiver Call       Warfarin doses taken: Warfarin taken as instructed    Diet: No new diet changes identified    New illness, injury, or hospitalization: No    Medication/supplement changes: None noted    Signs or symptoms of bleeding or clotting: No    Previous INR: Supratherapeutic    Additional findings: Pt denies missed doses or changes in his diet.      PLAN     Recommended plan for no diet, medication or health factor changes affecting INR     Dosing Instructions: Booster dose then continue your current warfarin dose with next INR in 1 week       Summary  As of 9/22/2021    Full warfarin instructions:  9/22: 6 mg; Otherwise 2 mg every Tue, Thu, Sat; 4 mg all other days   Next INR check:  9/28/2021             Telephone call with Eliseo who verbalizes understanding and agrees to plan and who agrees to plan and repeated back plan correctly    Patient using outside facility for labs    Education provided: None required    Plan made with [facility] Pharmacist Sarah Gaffney and per LVAD protocol    Luiza Perkins, RN  Anticoagulation Clinic  9/22/2021    _______________________________________________________________________     Anticoagulation Episode Summary     Current INR goal:  2.0-3.0   TTR:  85.5 % (10.5 mo)   Target end date:  Indefinite   Send INR reminders to:  [facility]    Indications    LVAD (left ventricular assist device) present (H) [Z95.811]  Chronic systolic congestive heart failure (H) [I50.22]  Paroxysmal atrial fibrillation (H) [I48.0]           Comments:  INR GOAL RANGE CHANGE 8/17/21 1.8-2.3 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 HM 3  placed 2/18/2020, 81mg ASA, Speak to spouse, Veronique at [phone] 4/14/2020:  [facility]: [facility]:(p)[phone] (f) [phone]         Anticoagulation Care Providers     Provider Role Specialty Phone number    Nader Cisneros MD Referring Advanced Heart Failure and Transplant Cardiology [phone]

## 2021-09-28 ENCOUNTER — ANTICOAGULATION THERAPY VISIT (OUTPATIENT)
Dept: ANTICOAGULATION | Facility: CLINIC | Age: 66
End: 2021-09-28

## 2021-09-28 DIAGNOSIS — I48.0 PAROXYSMAL ATRIAL FIBRILLATION (H): ICD-10-CM

## 2021-09-28 DIAGNOSIS — Z95.811 LVAD (LEFT VENTRICULAR ASSIST DEVICE) PRESENT (H): Primary | ICD-10-CM

## 2021-09-28 DIAGNOSIS — I50.22 CHRONIC SYSTOLIC CONGESTIVE HEART FAILURE (H): ICD-10-CM

## 2021-09-28 LAB — INR (EXTERNAL): 1.4 (ref 0.9–1.1)

## 2021-09-28 NOTE — PROGRESS NOTES
9/30/21 ADDENDUM  Received 9/29 lab results today from outside lab.  Did not contact patient/spouse.  INR result on 9/29/21 is 1.46. No changes to recommendation below.  KRISTEN Wilburn          ANTICOAGULATION MANAGEMENT     Eliseo Tanner 66 year old male is on warfarin with subtherapeutic INR result. (Goal INR 1.8-2.3)    Recent labs: (last 7 days)     09/28/21  1150   INR 1.4*       ASSESSMENT     Source(s): Chart Review and Patient/Caregiver Call       Warfarin doses taken: Warfarin taken as instructed    Diet: No new diet changes identified    New illness, injury, or hospitalization: No    Medication/supplement changes: None noted    Signs or symptoms of bleeding or clotting: No    Previous INR: Subtherapeutic    Additional findings: Veronique calling.  No missed doses or change to diet.  Updated calendar.  Changed maintenance dose to 2mg on Sat, 4mg all other days.     PLAN     Recommended plan for no diet, medication or health factor changes affecting INR     Dosing Instructions:  Increase your warfarin dose (9% change) with next INR in 6 days       Summary  As of 9/28/2021    Full warfarin instructions:  2 mg every Sat; 4 mg all other days   Next INR check:  10/4/2021             Telephone call with  Patient's spouse who agrees to plan and repeated back plan correctly    Patient using outside facility for labs    Education provided: Please call back if any changes to your diet, medications or how you've been taking warfarin    Plan made with [facility] Pharmacist Sarah Gaffney and per LVAD protocol    Young Bustos, RN  Anticoagulation Clinic  9/28/2021    _______________________________________________________________________     Anticoagulation Episode Summary     Current INR goal:  2.0-3.0   TTR:  83.2 % (10.6 mo)   Target end date:  Indefinite   Send INR reminders to:  [facility]    Indications    LVAD (left ventricular assist device) present (H) [Z95.811]  Chronic systolic congestive heart failure (H)  [I50.22]  Paroxysmal atrial fibrillation (H) [I48.0]           Comments:  INR GOAL RANGE CHANGE 8/17/21 1.8-2.3 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 HM 3  placed 2/18/2020, 81mg ASA, Speak to spouse, Veronique at [phone] 4/14/2020:  Lab: [facility]:(p)[phone] (f) [phone]         Anticoagulation Care Providers     Provider Role Specialty Phone number    Nader Cisneros MD Referring Advanced Heart Failure and Transplant Cardiology [phone]

## 2021-09-29 LAB — INR (EXTERNAL): 1.46 (ref 0.9–1.1)

## 2021-09-30 DIAGNOSIS — I48.0 PAROXYSMAL ATRIAL FIBRILLATION (H): ICD-10-CM

## 2021-09-30 DIAGNOSIS — Z95.811 LVAD (LEFT VENTRICULAR ASSIST DEVICE) PRESENT (H): ICD-10-CM

## 2021-09-30 RX ORDER — WARFARIN SODIUM 4 MG/1
TABLET ORAL
Qty: 90 TABLET | Refills: 0 | Status: SHIPPED | OUTPATIENT
Start: 2021-09-30 | End: 2022-01-28

## 2021-10-05 LAB — INR (EXTERNAL): 1.9 (ref 0.9–1.1)

## 2021-10-06 ENCOUNTER — ANTICOAGULATION THERAPY VISIT (OUTPATIENT)
Dept: ANTICOAGULATION | Facility: CLINIC | Age: 66
End: 2021-10-06

## 2021-10-06 DIAGNOSIS — Z95.811 LVAD (LEFT VENTRICULAR ASSIST DEVICE) PRESENT (H): Primary | ICD-10-CM

## 2021-10-06 DIAGNOSIS — I50.22 CHRONIC SYSTOLIC CONGESTIVE HEART FAILURE (H): ICD-10-CM

## 2021-10-06 DIAGNOSIS — I48.0 PAROXYSMAL ATRIAL FIBRILLATION (H): ICD-10-CM

## 2021-10-06 NOTE — PROGRESS NOTES
ANTICOAGULATION MANAGEMENT     Eliseo Tanner 66 year old male is on warfarin with therapeutic INR result. INR Goal range is 1.8-2.3    Recent labs: (last 7 days)     10/05/21  0000   INR 1.9*       ASSESSMENT     Source(s): Chart Review and Patient/Caregiver Call       Warfarin doses taken: Warfarin taken as instructed    Diet: No new diet changes identified    New illness, injury, or hospitalization: No    Medication/supplement changes: increase in Amlodipine by cards on 9/29/21- reviewed and no interactions expected.    Signs or symptoms of bleeding or clotting: No    Previous INR: Subtherapeutic    Additional findings: None     PLAN     Recommended plan for no diet, medication or health factor changes affecting INR     Dosing Instructions: Continue your current warfarin dose with next INR in 1 week       Summary  As of 10/6/2021    Full warfarin instructions:  2 mg every Sat; 4 mg all other days   Next INR check:  10/13/2021             Telephone call with  Chapis who verbalizes understanding and agrees to plan    Patient using outside facility for labs    Education provided: Goal range and significance of current result    Plan made per [facility] anticoagulation protocol and per LVAD protocol    Char Duenas RN  Anticoagulation Clinic  10/6/2021    _______________________________________________________________________     Anticoagulation Episode Summary     Current INR goal:     TTR:  81.0 % (10.5 mo)   Target end date:  Indefinite   Send INR reminders to:  [facility]    Indications    LVAD (left ventricular assist device) present (H) [Z95.811]  Chronic systolic congestive heart failure (H) [I50.22]  Paroxysmal atrial fibrillation (H) [I48.0]           Comments:  INR GOAL RANGE CHANGE 8/17/21 1.8-2.3 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 HM 3  placed 2/18/2020, 81mg ASA, Speak to spouse, Veronique at [phone] 4/14/2020:  [facility]: [facility]:(p)[phone] (f) [phone]         Anticoagulation Care Providers     Provider Role Specialty Phone number    Nader Cisneros MD Referring Advanced Heart Failure and Transplant Cardiology [phone]

## 2021-10-10 ENCOUNTER — HEALTH MAINTENANCE LETTER (OUTPATIENT)
Age: 66
End: 2021-10-10

## 2021-10-12 ENCOUNTER — ANTICOAGULATION THERAPY VISIT (OUTPATIENT)
Dept: ANTICOAGULATION | Facility: CLINIC | Age: 66
End: 2021-10-12

## 2021-10-12 DIAGNOSIS — I48.0 PAROXYSMAL ATRIAL FIBRILLATION (H): ICD-10-CM

## 2021-10-12 DIAGNOSIS — Z95.811 LVAD (LEFT VENTRICULAR ASSIST DEVICE) PRESENT (H): Primary | ICD-10-CM

## 2021-10-12 DIAGNOSIS — I50.22 CHRONIC SYSTOLIC CONGESTIVE HEART FAILURE (H): ICD-10-CM

## 2021-10-12 LAB — INR (EXTERNAL): 2.4 (ref 0.9–1.1)

## 2021-10-12 NOTE — PROGRESS NOTES
ANTICOAGULATION MANAGEMENT     Eliseo Tanner 66 year old male is on warfarin with supratherapeutic INR result. (Goal INR 1.8-2.3)    Recent labs: (last 7 days)     10/12/21  0856   INR 2.4*       ASSESSMENT     Source(s): Chart Review and Patient/Caregiver Call       Warfarin doses taken: Warfarin taken as instructed    Diet: No new diet changes identified    New illness, injury, or hospitalization: No    Medication/supplement changes: None noted    Signs or symptoms of bleeding or clotting: Yes: bruising-no more than usual    Previous INR: Therapeutic last visit; previously outside of goal range    Additional findings: None     PLAN     Recommended plan for no diet, medication or health factor changes affecting INR     Dosing Instructions: Continue your current warfarin dose with next INR in 1 week       Summary  As of 10/12/2021    Full warfarin instructions:  2 mg every Sat; 4 mg all other days   Next INR check:  10/19/2021             Telephone call with Eliseo who verbalizes understanding and agrees to plan and who agrees to plan and repeated back plan correctly    Patient using outside facility for labs    Education provided: Goal range and significance of current result, Monitoring for bleeding signs and symptoms, Importance of notifying clinic for changes in medications; a sooner lab recheck maybe needed., Importance of notifying clinic for diarrhea, nausea/vomiting, reduced intake, and/or illness; a sooner lab recheck maybe needed., Importance of notifying clinic of upcoming surgeries and procedures 2 weeks in advance and Contact [phone] with any changes, questions or concerns.     Plan made with [facility] Pharmacist Sarah Gaffney and per LVAD protocol    PACO MARQUEZ RN  Anticoagulation Clinic  10/12/2021    _______________________________________________________________________     Anticoagulation Episode Summary     Current INR goal:     TTR:  80.7 % (10.6 mo)   Target end date:  Indefinite   Send  INR reminders to:  [facility]    Indications    LVAD (left ventricular assist device) present (H) [Z95.811]  Chronic systolic congestive heart failure (H) [I50.22]  Paroxysmal atrial fibrillation (H) [I48.0]           Comments:  INR GOAL RANGE CHANGE 8/17/21 1.8-2.3 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 HM 3  placed 2/18/2020, 81mg ASA, Speak to spouse, Veronique at [phone] 4/14/2020:  Lab: [facility]:(p)[phone] (f) [phone]         Anticoagulation Care Providers     Provider Role Specialty Phone number    Nader Cisneros MD Referring Advanced Heart Failure and Transplant Cardiology [phone]

## 2021-10-19 ENCOUNTER — ANTICOAGULATION THERAPY VISIT (OUTPATIENT)
Dept: ANTICOAGULATION | Facility: CLINIC | Age: 66
End: 2021-10-19

## 2021-10-19 DIAGNOSIS — I48.0 PAROXYSMAL ATRIAL FIBRILLATION (H): ICD-10-CM

## 2021-10-19 DIAGNOSIS — I50.22 CHRONIC SYSTOLIC CONGESTIVE HEART FAILURE (H): ICD-10-CM

## 2021-10-19 DIAGNOSIS — Z95.811 LVAD (LEFT VENTRICULAR ASSIST DEVICE) PRESENT (H): Primary | ICD-10-CM

## 2021-10-19 LAB — INR (EXTERNAL): 1.9 (ref 0.9–1.1)

## 2021-10-19 NOTE — PROGRESS NOTES
ANTICOAGULATION MANAGEMENT     Eliseo Tanner 66 year old male is on warfarin with therapeutic INR result. (Goal INR 1.8-2.3)    Recent labs: (last 7 days)     10/19/21  0910   INR 1.9*       ASSESSMENT     Source(s): Chart Review and Patient/Caregiver Call       Warfarin doses taken: Warfarin taken as instructed    Diet: No new diet changes identified    New illness, injury, or hospitalization: No    Medication/supplement changes: None noted    Signs or symptoms of bleeding or clotting: No    Previous INR: Supratherapeutic    Additional findings: None     PLAN     Recommended plan for no diet, medication or health factor changes affecting INR     Dosing Instructions: Continue your current warfarin dose with next INR in 1 week       Summary  As of 10/19/2021    Full warfarin instructions:  2 mg every Sat; 4 mg all other days   Next INR check:  10/26/2021             Telephone call with Eliseo who verbalizes understanding and agrees to plan and who agrees to plan and repeated back plan correctly    Patient using outside facility for labs    Education provided: Goal range and significance of current result, Monitoring for bleeding signs and symptoms, Monitoring for clotting signs and symptoms, Importance of notifying clinic for changes in medications; a sooner lab recheck maybe needed., Importance of notifying clinic for diarrhea, nausea/vomiting, reduced intake, and/or illness; a sooner lab recheck maybe needed., Importance of notifying clinic of upcoming surgeries and procedures 2 weeks in advance and Contact [phone] with any changes, questions or concerns.     Plan made per [facility] anticoagulation protocol and per LVAD protocol    PACO MARQUEZ RN  Anticoagulation Clinic  10/19/2021    _______________________________________________________________________     Anticoagulation Episode Summary     Current INR goal:     TTR:  80.2 % (10.6 mo)   Target end date:  Indefinite   [facility]    Indications    LVAD (left ventricular assist device) present (H) [Z95.811]  Chronic systolic congestive heart failure (H) [I50.22]  Paroxysmal atrial fibrillation (H) [I48.0]           Comments:  INR GOAL RANGE CHANGE 8/17/21 1.8-2.3 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 HM 3  placed 2/18/2020, 81mg ASA, Speak to spouse, Veronique at [phone] 4/14/2020:  Lab: [facility]:(p)[phone] (f) [phone]         Anticoagulation Care Providers     Provider Role Specialty Phone number    Nader Cisneros MD Referring Advanced Heart Failure and Transplant Cardiology [phone]

## 2021-10-26 ENCOUNTER — ANTICOAGULATION THERAPY VISIT (OUTPATIENT)
Dept: ANTICOAGULATION | Facility: CLINIC | Age: 66
End: 2021-10-26

## 2021-10-26 ENCOUNTER — TRANSFERRED RECORDS (OUTPATIENT)
Dept: HEALTH INFORMATION MANAGEMENT | Facility: CLINIC | Age: 66
End: 2021-10-26
Payer: MEDICARE

## 2021-10-26 DIAGNOSIS — I48.0 PAROXYSMAL ATRIAL FIBRILLATION (H): ICD-10-CM

## 2021-10-26 DIAGNOSIS — Z95.811 LVAD (LEFT VENTRICULAR ASSIST DEVICE) PRESENT (H): Primary | ICD-10-CM

## 2021-10-26 DIAGNOSIS — I50.22 CHRONIC SYSTOLIC CONGESTIVE HEART FAILURE (H): ICD-10-CM

## 2021-10-26 LAB — INR (EXTERNAL): 1.6 (ref 0.9–1.1)

## 2021-10-26 NOTE — PROGRESS NOTES
ANTICOAGULATION MANAGEMENT     Eliseo Tanner 66 year old male is on warfarin with therapeutic INR result. (Goal INR 1.8-2.3)    Recent labs: (last 7 days)     10/26/21  1556   INR 1.6*       ASSESSMENT     Source(s): Chart Review and Patient/Caregiver Call       Warfarin doses taken: Missed dose(s) may be affecting INR    Diet: No new diet changes identified    New illness, injury, or hospitalization: No    Medication/supplement changes: None noted    Signs or symptoms of bleeding or clotting: No    Previous INR: Therapeutic last visit; previously outside of goal range    Additional findings: None     PLAN     Recommended plan for temporary change(s) affecting INR     Dosing Instructions: Booster dose then continue your current warfarin dose with next INR in 6 days       Summary  As of 10/26/2021    Full warfarin instructions:  10/26: 6 mg; Otherwise 2 mg every Sat; 4 mg all other days   Next INR check:  11/1/2021             Telephone call with  spouse who agrees to plan and repeated back plan correctly    Patient using outside facility for labs    Education provided: Monitoring for bleeding signs and symptoms, Monitoring for clotting signs and symptoms and Contact [phone] with any changes, questions or concerns.     Plan made with [facility] Pharmacist Tamiko Rosas and per LVAD protocol    Young Bustos, RN  Anticoagulation Clinic  10/26/2021    _______________________________________________________________________     Anticoagulation Episode Summary     Current INR goal:     TTR:  77.9 % (10.6 mo)   Target end date:  Indefinite   Send INR reminders to:  [facility]    Indications    LVAD (left ventricular assist device) present (H) [Z95.811]  Chronic systolic congestive heart failure (H) [I50.22]  Paroxysmal atrial fibrillation (H) [I48.0]           Comments:  INR GOAL RANGE CHANGE 8/17/21 1.8-2.3 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 HM 3  placed 2/18/2020, 81mg  ASA, Speak to spouse, Veronique at [phone] 4/14/2020:  Lab: [facility]:(p)[phone] (f) [phone]         Anticoagulation Care Providers     Provider Role Specialty Phone number    Nader Cisneros MD Referring Advanced Heart Failure and Transplant Cardiology [phone]

## 2021-11-01 ENCOUNTER — ANTICOAGULATION THERAPY VISIT (OUTPATIENT)
Dept: ANTICOAGULATION | Facility: CLINIC | Age: 66
End: 2021-11-01

## 2021-11-01 DIAGNOSIS — I48.0 PAROXYSMAL ATRIAL FIBRILLATION (H): ICD-10-CM

## 2021-11-01 DIAGNOSIS — Z95.811 LVAD (LEFT VENTRICULAR ASSIST DEVICE) PRESENT (H): Primary | ICD-10-CM

## 2021-11-01 DIAGNOSIS — I50.22 CHRONIC SYSTOLIC CONGESTIVE HEART FAILURE (H): ICD-10-CM

## 2021-11-01 LAB — INR (EXTERNAL): 2.1 (ref 0.9–1.1)

## 2021-11-01 NOTE — PROGRESS NOTES
ANTICOAGULATION MANAGEMENT     Eliseo Tanner 66 year old male is on warfarin with therapeutic INR result. (Goal INR 1.8 to 2.3)    Recent labs: (last 7 days)     11/01/21  1735   INR 2.1*       ASSESSMENT     Source(s): Chart Review and Patient/Caregiver Call       Warfarin doses taken: Warfarin taken as instructed    Diet: No new diet changes identified    New illness, injury, or hospitalization: No    Medication/supplement changes: None noted    Signs or symptoms of bleeding or clotting: No    Previous INR: Subtherapeutic    Additional findings: None     PLAN     Recommended plan for no diet, medication or health factor changes affecting INR     Dosing Instructions: Continue your current warfarin dose with next INR in 1 week       Summary  As of 11/1/2021    Full warfarin instructions:  2 mg every Sat; 4 mg all other days   Next INR check:  11/9/2021             Telephone call with  Veronique who agrees to plan and repeated back plan correctly    Patient using outside facility for labs    Education provided: None required    Plan made per [facility] anticoagulation protocol and per LVAD protocol    Young Bustos, RN  Anticoagulation Clinic  11/1/2021    _______________________________________________________________________     Anticoagulation Episode Summary     Current INR goal:     TTR:  76.4 % (10.6 mo)   Target end date:  Indefinite   Send INR reminders to:  [facility]    Indications    LVAD (left ventricular assist device) present (H) [Z95.811]  Chronic systolic congestive heart failure (H) [I50.22]  Paroxysmal atrial fibrillation (H) [I48.0]           Comments:  INR GOAL RANGE CHANGE 8/17/21 1.8-2.3 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 HM 3  placed 2/18/2020, 81mg ASA, Speak to spouse, Veronique at [phone] 4/14/2020:  Lab: [facility]:(p)[phone] (f) [phone]         Anticoagulation Care Providers     Provider Role Specialty Phone number    Blayne  MD Nader Referring Advanced Heart Failure and Transplant Cardiology [phone]

## 2021-11-10 ENCOUNTER — ANTICOAGULATION THERAPY VISIT (OUTPATIENT)
Dept: ANTICOAGULATION | Facility: CLINIC | Age: 66
End: 2021-11-10
Payer: MEDICARE

## 2021-11-10 ENCOUNTER — TRANSFERRED RECORDS (OUTPATIENT)
Dept: HEALTH INFORMATION MANAGEMENT | Facility: CLINIC | Age: 66
End: 2021-11-10
Payer: MEDICARE

## 2021-11-10 DIAGNOSIS — I48.0 PAROXYSMAL ATRIAL FIBRILLATION (H): ICD-10-CM

## 2021-11-10 DIAGNOSIS — I50.22 CHRONIC SYSTOLIC CONGESTIVE HEART FAILURE (H): ICD-10-CM

## 2021-11-10 DIAGNOSIS — Z95.811 LVAD (LEFT VENTRICULAR ASSIST DEVICE) PRESENT (H): Primary | ICD-10-CM

## 2021-11-10 LAB — INR (EXTERNAL): 1.7 (ref 0.9–1.1)

## 2021-11-10 NOTE — PROGRESS NOTES
ANTICOAGULATION MANAGEMENT     Eliseo Tanner 66 year old male is on warfarin with subtherapeutic INR result. (Goal INR  1.8-2.3 )    Recent labs: (last 7 days)     11/10/21  0000   INR 1.7*     ASSESSMENT     Source(s): Chart Review and Patient/Caregiver Call     Warfarin doses taken: Warfarin taken as instructed  Diet:  had some orange chicken and ring bologna (increase sodium)  New illness, injury, or hospitalization: No  Medication/supplement changes: None noted  Signs or symptoms of bleeding or clotting: No  Previous INR: Therapeutic last visit; previously outside of goal range  Additional findings: up about 5#-advised to let VAD coordinator know.     PLAN     Recommended plan for no diet, medication or health factor changes affecting INR     Dosing Instructions:  Increase your warfarin dose (7.7% change) with next INR in 1 week       Summary  As of 11/10/2021      Full warfarin instructions:  4 mg every day   Next INR check:  11/17/2021               Telephone call with  Veronique who verbalizes understanding and agrees to plan and who agrees to plan and repeated back plan correctly    Patient using outside facility for labs    Education provided: Goal range and significance of current result    Plan made with [facility] Pharmacist Tamiko Duenas, RN  Anticoagulation Clinic  11/10/2021    _______________________________________________________________________     Anticoagulation Episode Summary       Current INR goal:     TTR:  76.2 % (10.5 mo)   Target end date:  Indefinite   Send INR reminders to:  [facility]    Indications    LVAD (left ventricular assist device) present (H) [Z95.811]  Chronic systolic congestive heart failure (H) [I50.22]  Paroxysmal atrial fibrillation (H) [I48.0]             Comments:  INR GOAL RANGE CHANGE 8/17/21 1.8-2.3 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 HM 3  placed 2/18/2020, 81mg ASA, Speak to spouse, Veronique at  [phone] 4/14/2020:  Lab: [facility]:(p)[phone] (f) [phone]             Anticoagulation Care Providers       Provider Role Specialty Phone number    Nader Cisneros MD Referring Advanced Heart Failure and Transplant Cardiology [phone]

## 2021-11-17 ENCOUNTER — TRANSFERRED RECORDS (OUTPATIENT)
Dept: HEALTH INFORMATION MANAGEMENT | Facility: CLINIC | Age: 66
End: 2021-11-17
Payer: MEDICARE

## 2021-11-18 ENCOUNTER — ANTICOAGULATION THERAPY VISIT (OUTPATIENT)
Dept: ANTICOAGULATION | Facility: CLINIC | Age: 66
End: 2021-11-18
Payer: MEDICARE

## 2021-11-18 DIAGNOSIS — I50.22 CHRONIC SYSTOLIC CONGESTIVE HEART FAILURE (H): ICD-10-CM

## 2021-11-18 DIAGNOSIS — I48.0 PAROXYSMAL ATRIAL FIBRILLATION (H): ICD-10-CM

## 2021-11-18 DIAGNOSIS — Z95.811 LVAD (LEFT VENTRICULAR ASSIST DEVICE) PRESENT (H): Primary | ICD-10-CM

## 2021-11-18 LAB — INR (EXTERNAL): 1.9 (ref 0.9–1.1)

## 2021-11-18 NOTE — PROGRESS NOTES
ANTICOAGULATION MANAGEMENT     Eliseo Tanner 66 year old male is on warfarin with therapeutic INR result. (Goal INR 1.8-2.3)    Recent labs: (last 7 days)     11/17/21  1600   INR 1.9*       ASSESSMENT     Source(s): Chart Review and Patient/Caregiver Call       Warfarin doses taken: Warfarin taken as instructed    Diet: No new diet changes identified    New illness, injury, or hospitalization: No    Medication/supplement changes: None noted    Signs or symptoms of bleeding or clotting: No    Previous INR: Therapeutic last 2(+) visits    Additional findings: None     PLAN     Recommended plan for no diet, medication or health factor changes affecting INR     Dosing Instructions: Continue your current warfarin dose with next INR in 2 weeks       Summary  As of 11/18/2021    Full warfarin instructions:  4 mg every day   Next INR check:  12/1/2021             Telephone call with Eliseo who verbalizes understanding and agrees to plan and who agrees to plan and repeated back plan correctly    Patient using outside facility for labs    Education provided: Goal range and significance of current result, Importance of notifying clinic for changes in medications; a sooner lab recheck maybe needed., Importance of notifying clinic for diarrhea, nausea/vomiting, reduced intake, and/or illness; a sooner lab recheck maybe needed., Importance of notifying clinic of upcoming surgeries and procedures 2 weeks in advance and Contact [phone] with any changes, questions or concerns.     Plan made per [facility] anticoagulation protocol and per LVAD protocol    PACO MARQUEZ RN  Anticoagulation Clinic  11/18/2021    _______________________________________________________________________     Anticoagulation Episode Summary     Current INR goal:     TTR:  75.1 % (10.7 mo)   Target end date:  Indefinite   [facility]ers [facility]:  [facility]    Indications    LVAD (left ventricular assist device) present (H) [Z95.811]  Chronic  systolic congestive heart failure (H) [I50.22]  Paroxysmal atrial fibrillation (H) [I48.0]           Comments:  INR GOAL RANGE CHANGE 8/17/21 1.8-2.3 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 HM 3  placed 2/18/2020, 81mg ASA, Speak to spouse, Veronique at [phone] 4/14/2020:  Lab: [facility]:(p)[phone] (f) [phone]         Anticoagulation Care Providers     Provider Role Specialty Phone number    Nader Cisneros MD Referring Advanced Heart Failure and Transplant Cardiology [phone]

## 2021-11-20 NOTE — BRIEF OP NOTE
[facility], [place]    Brief Operative Note    Pre-operative diagnosis: Hemothorax [J94.2]  Post-operative diagnosis Same as pre-operative diagnosis    Procedure: Procedure(s):  THORACOSCOPY PSB WASHOUT, poss Thoracotomy  Surgeon: Surgeon(s) and Role:     * William Gan MD - Primary     * Mac Jaramillo MD - Assisting     * Vargas Montaño MD - Resident - Assisting  Anesthesia: General   Estimated blood loss: Less than 50 ml  Drains: Two right chest tubes  Specimens: * No specimens in log *  Findings:   1500cc of thin, sanguinous fluid evacuated. Very large clot in apex removed. No obvious source..  Complications: None.  Implants: * No implants in log *      To floor  ADAT  CT to low cont suction    [id number]         Discharge Summary:      Saira Mccoy    Admit Date:   2021  Discharge Date:  2021     Admitting diagnosis:  Delivery by  section of full-term infant [O82]  Discharge Diagnosis: Status post  for breech.  Pregnancy Complications: Covid in pregnancy   Tubal Ligation:  no        History:  Past Medical History:   Diagnosis Date   • Dental disorder     Braces     OB History    Para Term  AB Living   1 1 1     1   SAB IAB Ectopic Molar Multiple Live Births           0 1      # Outcome Date GA Lbr Willam/2nd Weight Sex Delivery Anes PTL Lv   1 Term 21 39w2d  2.82 kg (6 lb 3.5 oz) F CS-LTranv Spinal  CARLITO        Bactrim [sulfamethoxazole w-trimethoprim] and Sulfa drugs  Patient Active Problem List    Diagnosis Date Noted   • Breech presentation, no version, desires c/s 10/26/2021   • COVID-19 affecting pregnancy in third trimester 2021   • Supervision of normal first pregnancy, antepartum 2021   • PID (acute pelvic inflammatory disease) 2016        Hospital Course:   24 y.o. , now para 1, was admitted with the above mentioned diagnosis, underwent Primary  breech,  for breech. Patient postpartum course was unremarkable, with progressive advancement in diet , ambulation and toleration of oral analgesia. Patient without complaints today and desires discharge.      Vitals:    21 1000 21 1400 21 1800 21 0600   BP: 110/72 114/74 108/74 108/70   Pulse: 92 86 78 81   Resp: 18 17 17 16   Temp: 37.1 °C (98.7 °F) 36.6 °C (97.9 °F) 36.7 °C (98.1 °F) 36.9 °C (98.5 °F)   TempSrc: Temporal Temporal Temporal Temporal   SpO2: 96% 97% 98% 95%   Weight:       Height:           Current Facility-Administered Medications   Medication Dose   • ibuprofen (MOTRIN) tablet 800 mg  800 mg   • acetaminophen (Tylenol) tablet 975 mg  975 mg   • oxyCODONE immediate-release (ROXICODONE) tablet 5 mg  5 mg   • oxyCODONE immediate-release  (ROXICODONE) tablet 10 mg  10 mg   • ondansetron (ZOFRAN) syringe/vial injection 4 mg  4 mg    Or   • ondansetron (ZOFRAN ODT) dispertab 4 mg  4 mg   • simethicone (Mylicon) chewable tablet 125 mg  125 mg   • LR infusion     • PRN oxytocin (PITOCIN) (20 Units/1000 mL) PRN for excessive uterine bleeding - See Admin Instr  125-999 mL/hr   • miSOPROStol (CYTOTEC) tablet 800 mcg  800 mcg   • docusate sodium (COLACE) capsule 100 mg  100 mg       Exam:  Breast Exam: negative  Abdomen: Abdomen soft, non-tender. BS normal. No masses,  No organomegaly  Fundus Non Tender: yes  Incision: dry and intact  Perineum: perineum intact  Extremity: extremities, peripheral pulses and reflexes normal, no edema, redness or tenderness in the calves or thighs     Labs:  Recent Labs     11/18/21  1240 11/19/21  0633   WBC 15.0* 19.0*   RBC 3.73* 3.12*   HEMOGLOBIN 11.1* 9.2*   HEMATOCRIT 33.9* 28.4*   MCV 90.9 91.0   MCH 29.8 29.5   MCHC 32.7* 32.4*   RDW 44.8 44.8   PLATELETCT 236 225   MPV 10.0 9.8        Activity:   Discharge to home  Pelvic Rest x 6 weeks    Assessment:  normal postpartum course  Discharge Assessment: No areas of skin breakdown/redness; surgical incision intact/healing     [facility] in 5 weeks for vaginal   Declines birth control with d/c  C/s check in one week      Discharge Meds:   Current Outpatient Medications   Medication Sig Dispense Refill   • docusate sodium 100 MG Cap Take 100 mg by mouth 2 times a day as needed for Constipation. 60 Capsule 2   • ibuprofen (MOTRIN) 800 MG Tab Take 1 Tablet by mouth every 8 hours as needed (For cramping after delivery; do not give if patient is receiving ketorolac (Toradol)). 60 Tablet 2   • oxyCODONE immediate-release (ROXICODONE) 5 MG Tab Take 1 Tablet by mouth every four hours as needed for up to 7 days. 20 Tablet 0       MIGUELITO TiradoRDylonN.

## 2021-11-23 ENCOUNTER — ANCILLARY PROCEDURE (OUTPATIENT)
Dept: CARDIOLOGY | Facility: CLINIC | Age: 66
End: 2021-11-23
Attending: INTERNAL MEDICINE
Payer: MEDICARE

## 2021-11-23 ENCOUNTER — TELEPHONE (OUTPATIENT)
Dept: ANTICOAGULATION | Facility: CLINIC | Age: 66
End: 2021-11-23
Payer: MEDICARE

## 2021-11-23 DIAGNOSIS — I50.22 CHRONIC SYSTOLIC CONGESTIVE HEART FAILURE (H): ICD-10-CM

## 2021-11-23 DIAGNOSIS — I50.23 ACUTE ON CHRONIC SYSTOLIC CONGESTIVE HEART FAILURE (H): ICD-10-CM

## 2021-11-23 DIAGNOSIS — I48.0 PAROXYSMAL ATRIAL FIBRILLATION (H): ICD-10-CM

## 2021-11-23 DIAGNOSIS — Z95.811 LVAD (LEFT VENTRICULAR ASSIST DEVICE) PRESENT (H): Primary | ICD-10-CM

## 2021-11-23 PROCEDURE — 93296 REM INTERROG EVL PM/IDS: CPT

## 2021-11-23 PROCEDURE — 93295 DEV INTERROG REMOTE 1/2/MLT: CPT | Performed by: INTERNAL MEDICINE

## 2021-11-23 NOTE — TELEPHONE ENCOUNTER
Veronique called today to report that patient has had a 5 minute nosebleed for the past 3 days.  They have a humidifier at home and Bon already uses saline nasal spray.  Veronique reports that patient took 2mg of warfarin on 11/21.  Patient is advised to have an INR tomorrow.

## 2021-11-24 ENCOUNTER — ANTICOAGULATION THERAPY VISIT (OUTPATIENT)
Dept: ANTICOAGULATION | Facility: CLINIC | Age: 66
End: 2021-11-24
Payer: MEDICARE

## 2021-11-24 ENCOUNTER — CARE COORDINATION (OUTPATIENT)
Dept: CARDIOLOGY | Facility: CLINIC | Age: 66
End: 2021-11-24
Payer: MEDICARE

## 2021-11-24 ENCOUNTER — TRANSFERRED RECORDS (OUTPATIENT)
Dept: HEALTH INFORMATION MANAGEMENT | Facility: CLINIC | Age: 66
End: 2021-11-24
Payer: MEDICARE

## 2021-11-24 DIAGNOSIS — Z95.811 LVAD (LEFT VENTRICULAR ASSIST DEVICE) PRESENT (H): Primary | ICD-10-CM

## 2021-11-24 DIAGNOSIS — I50.22 CHRONIC SYSTOLIC CONGESTIVE HEART FAILURE (H): ICD-10-CM

## 2021-11-24 DIAGNOSIS — I48.0 PAROXYSMAL ATRIAL FIBRILLATION (H): ICD-10-CM

## 2021-11-24 LAB — INR (EXTERNAL): 2.1 (ref 0.9–1.1)

## 2021-11-24 NOTE — PROGRESS NOTES
ANTICOAGULATION MANAGEMENT     Eliseo Tanner 66 year old male is on warfarin with therapeutic INR result. (Goal INR 1.8-2.3)    Recent labs: (last 7 days)     11/24/21  0938   INR 2.1*       ASSESSMENT     Source(s): Chart Review and Patient/Caregiver Call       Warfarin doses taken: Warfarin taken as instructed    Diet: No new diet changes identified    New illness, injury, or hospitalization: No    Medication/supplement changes: None noted    Signs or symptoms of bleeding or clotting: Yes: ongoing nosebleed-woke patient up last night    Previous INR: Therapeutic last visit; previously outside of goal range    Additional findings: Maintenance dose decreased due to nosebleeds     PLAN     Recommended plan for ongoing change(s) affecting INR     Dosing Instructions:  Decrease your warfarin dose (14.3% change) with next INR in 1 week       Summary  As of 11/24/2021    Full warfarin instructions:  2 mg every Wed, Sat; 4 mg all other days   Next INR check:  12/1/2021             Telephone call with Eliseo who verbalizes understanding and agrees to plan and who agrees to plan and repeated back plan correctly    Patient using outside facility for labs    Education provided: Goal range and significance of current result, Monitoring for bleeding signs and symptoms, Importance of notifying clinic for changes in medications; a sooner lab recheck maybe needed., Importance of notifying clinic for diarrhea, nausea/vomiting, reduced intake, and/or illness; a sooner lab recheck maybe needed., Importance of notifying clinic of upcoming surgeries and procedures 2 weeks in advance and Contact [phone] with any changes, questions or concerns.     Plan made per [facility] anticoagulation protocol and per LVAD protocol    PACO MARQUEZ RN  Anticoagulation Clinic  11/24/2021    _______________________________________________________________________     Anticoagulation Episode Summary     Current INR goal:     TTR:  74.8 % (10.9 mo)    Target end date:  Indefinite   Send INR reminders to:  [facility]    Indications    LVAD (left ventricular assist device) present (H) [Z95.811]  Chronic systolic congestive heart failure (H) [I50.22]  Paroxysmal atrial fibrillation (H) [I48.0]           Comments:  INR GOAL RANGE CHANGE 8/17/21 1.8-2.3 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 HM 3  placed 2/18/2020, 81mg ASA, Speak to spouse, Veronique at [phone] 4/14/2020:  Lab: [facility]:(p)[phone] (f) [phone]         Anticoagulation Care Providers     Provider Role Specialty Phone number    Nader Cisneros MD Referring Advanced Heart Failure and Transplant Cardiology [phone]

## 2021-11-24 NOTE — PROGRESS NOTES
D:  Pt called to report intermittent nose bleeds over the last few days. Nose bleeds lasting no more than 10 minutes.  Pt using a house humidifier and nasal saline spray.   I:  Discussed options to help with nose bleeds.  Encouraged pt to add humidification to CPAP & to continue to using the techniques he is currently using.  Advised pt that if nose bleed lasts more than 20 minutes, he will need to go to the ER.    A:  Nose bleeds  P:  Pt verbalized understanding of the instructions given.  Will call VAD coordinator with further needs and questions.

## 2021-11-29 DIAGNOSIS — I50.22 CHRONIC SYSTOLIC CONGESTIVE HEART FAILURE (H): ICD-10-CM

## 2021-11-29 DIAGNOSIS — Z79.899 LONG TERM USE OF DRUG: ICD-10-CM

## 2021-11-29 DIAGNOSIS — Z79.899 OTHER LONG TERM (CURRENT) DRUG THERAPY: ICD-10-CM

## 2021-11-29 DIAGNOSIS — Z95.811 LEFT VENTRICULAR ASSIST DEVICE PRESENT (H): ICD-10-CM

## 2021-11-29 RX ORDER — LIDOCAINE 40 MG/G
CREAM TOPICAL
Status: CANCELLED | OUTPATIENT
Start: 2021-11-29

## 2021-11-29 NOTE — PROGRESS NOTES
D:  Called pt to discuss upcoming yearly appointments needed per Dr. Cisneros.  Pt agreeable to the plan.  I:  RHC, Echo, 6MW, PFT's, palliative, SW and labs all ordered.  Scheduling messaged to coordinate needed appt's and testing around 2/10, Magy appt.  A:  Yearly testing  P:  Pt verbalized understanding of the instructions given.  Will call VAD coordinator with further needs and questions.

## 2021-12-01 ENCOUNTER — TRANSFERRED RECORDS (OUTPATIENT)
Dept: HEALTH INFORMATION MANAGEMENT | Facility: CLINIC | Age: 66
End: 2021-12-01
Payer: MEDICARE

## 2021-12-01 LAB — INR (EXTERNAL): 2.2 (ref 0.9–1.1)

## 2021-12-01 NOTE — LETTER
Mechanical Circulatory Support Program  [place] [address], [facility] [address]  [address] [place] [address]  [phone] Office Phone  [phone] Fax Number    Faxed to:  [facility] Cardiology clinic  Fax Number:  [phone]        Patient Name: Eliseo Tanner    1955   Diagnosis/ICD-9: Heart Failure, unspecified I50.9; LVAD Z95.811   Requesting Physician: Dr. Nader Cisneros   Date of Request: 21   **Please fax results to Gladys Mccarthy RN VAD Coord at [phone].                Call [phone] with Questions    Date to draw ORDERS   21 CMP, CBC w/Plts, LDH, D-Dimer & INR  In prep for appointment with Dr. Cisneros's                     Signed,      Nader Cisneros MD  Heart Failure, Mechanical Circulatory Support and Transplant Cardiology   of Medicine,  Division of Cardiology, [facility]  ***

## 2021-12-02 ENCOUNTER — TELEPHONE (OUTPATIENT)
Dept: INFECTIOUS DISEASES | Facility: CLINIC | Age: 66
End: 2021-12-02
Payer: MEDICARE

## 2021-12-02 ENCOUNTER — ANTICOAGULATION THERAPY VISIT (OUTPATIENT)
Dept: ANTICOAGULATION | Facility: CLINIC | Age: 66
End: 2021-12-02
Payer: MEDICARE

## 2021-12-02 DIAGNOSIS — Z95.811 LVAD (LEFT VENTRICULAR ASSIST DEVICE) PRESENT (H): Primary | ICD-10-CM

## 2021-12-02 DIAGNOSIS — I48.0 PAROXYSMAL ATRIAL FIBRILLATION (H): ICD-10-CM

## 2021-12-02 DIAGNOSIS — I50.22 CHRONIC SYSTOLIC CONGESTIVE HEART FAILURE (H): ICD-10-CM

## 2021-12-02 NOTE — TELEPHONE ENCOUNTER
Spoke with Eliseo regarding Drive line discharge. He reports it is always clear, he is still experiencing no other side effects, and there have been no changes to his exit site. He has an appointment next week with his provider in [place] and they will send pictures of it.   Statement Selected

## 2021-12-02 NOTE — PROGRESS NOTES
ANTICOAGULATION MANAGEMENT     Eliseo Tanner 66 year old male is on warfarin with therapeutic INR result. (Goal INR 1.8-2.3)    Recent labs: (last 7 days)     12/02/21  1055   INR 2.2*       ASSESSMENT     Source(s): Chart Review and Patient/Caregiver Call       Warfarin doses taken: Warfarin taken as instructed    Diet: No new diet changes identified    New illness, injury, or hospitalization: No    Medication/supplement changes: None noted    Signs or symptoms of bleeding or clotting: No    Previous INR: Therapeutic last 2(+) visits    Additional findings: [facility] reports that patient has not had any further nosebleeds.      PLAN     Recommended plan for no diet, medication or health factor changes affecting INR     Dosing Instructions: Continue your current warfarin dose with next INR in 1 week       Summary  As of 12/2/2021    Full warfarin instructions:  2 mg every Wed, Sat; 4 mg all other days   Next INR check:  12/9/2021             Telephone call with  [facility] who verbalizes understanding and agrees to plan and who agrees to plan and repeated back plan correctly    Patient using outside facility for labs    Education provided: None required    Plan made per [facility] anticoagulation protocol and per LVAD protocol    Gloria Abbasi, RN  Anticoagulation Clinic  12/2/2021    _______________________________________________________________________     Anticoagulation Episode Summary     Current INR goal:     TTR:  75.1 % (10.9 mo)   Target end date:  Indefinite   Send INR reminders to:  [facility]    Indications    LVAD (left ventricular assist device) present (H) [Z95.811]  Chronic systolic congestive heart failure (H) [I50.22]  Paroxysmal atrial fibrillation (H) [I48.0]           Comments:  INR GOAL RANGE CHANGE 8/17/21 1.8-2.3 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  3  placed 2/18/2020, 81mg ASA, Speak to spouse, Veronique at [phone] 4/14/2020:  Lab: [facility]  Ctr:(p)[phone] (f) [phone]         Anticoagulation Care Providers     Provider Role Specialty Phone number    Nader Cisneros MD Referring Advanced Heart Failure and Transplant Cardiology [phone]

## 2021-12-05 ENCOUNTER — HEALTH MAINTENANCE LETTER (OUTPATIENT)
Age: 66
End: 2021-12-05

## 2021-12-08 ENCOUNTER — OFFICE VISIT (OUTPATIENT)
Dept: CARDIOLOGY | Facility: OTHER | Age: 66
End: 2021-12-08
Payer: MEDICARE

## 2021-12-08 DIAGNOSIS — I50.22 CHRONIC SYSTOLIC CONGESTIVE HEART FAILURE (H): ICD-10-CM

## 2021-12-08 DIAGNOSIS — I48.0 PAROXYSMAL ATRIAL FIBRILLATION (H): ICD-10-CM

## 2021-12-08 DIAGNOSIS — E78.2 MIXED HYPERLIPIDEMIA: ICD-10-CM

## 2021-12-08 DIAGNOSIS — Z95.811 LVAD (LEFT VENTRICULAR ASSIST DEVICE) PRESENT (H): ICD-10-CM

## 2021-12-08 DIAGNOSIS — I50.30 NYHA CLASS 3 HEART FAILURE WITH PRESERVED EJECTION FRACTION (H): Primary | ICD-10-CM

## 2021-12-08 DIAGNOSIS — I42.8 NONISCHEMIC CARDIOMYOPATHY (H): ICD-10-CM

## 2021-12-08 DIAGNOSIS — I10 BENIGN ESSENTIAL HYPERTENSION: ICD-10-CM

## 2021-12-08 PROCEDURE — 99214 OFFICE O/P EST MOD 30 MIN: CPT | Performed by: INTERNAL MEDICINE

## 2021-12-08 RX ORDER — AMLODIPINE BESYLATE 5 MG/1
7.5 TABLET ORAL DAILY
Qty: 135 TABLET | Refills: 3 | Status: SHIPPED | OUTPATIENT
Start: 2021-12-08 | End: 2022-02-10

## 2021-12-08 RX ORDER — BUMETANIDE 1 MG/1
4 TABLET ORAL DAILY
Qty: 360 TABLET | Refills: 3 | Status: SHIPPED | OUTPATIENT
Start: 2021-12-08 | End: 2022-02-09

## 2021-12-08 NOTE — LETTER
Mechanical Circulatory Support Program  [place] [address], [facility] [address]  [address] [place] [address]  [phone] Office Phone  [phone] Fax Number    Faxed to:  [facility]  Fax Number:  [phone]      Patient Name: Eliseo Tanner   : 1955   Diagnosis/ICD-10: Heart Failure, unspecified I50.9; LVAD Z95.811   Requesting Physician: Dr. Nader Cisneros   Date of Request: 21   Date to Draw Approximately 12/15/21                           Please fax results to [phone]       or email to [email or web address]                              Call [phone] with Questions  ORDER TEST   X Complete Metabolic Panel   X Complete Blood Count   X Lactate Dehydrogenase   X INR   X D-Dimer                 Signed,      Nader Cisneros MD  Heart Failure, Mechanical Circulatory Support and Transplant Cardiology   of Medicine,  Division of Cardiology, [facility]

## 2021-12-08 NOTE — PROGRESS NOTES
December 8, 2021     Eliseo Tanner is a 66 year old male with chronic systolic heart failure secondary to NICM now s/p HM 3 on 2/18, moderate CAD, HTN, ABHINAV on CPAP, DM2, CKD Stage III, ANA. He presents today for follow up clinic visit.    His HM3 post-op course was complicated by retrosternal hematoma and pulmonary hemorrhage, RV failure,VT in ICU now on amiodarone and Afib w/AVR S/p DCCV on 2/28. He had pre-op proteus and enterococcus bacteremia from 2/13, s/p abx. He had an ICD placed on 3/16/2020 just before his discharge from the hospital. He felt better after his LVAD implantation. On 11/14/20, patient was directly admitted by ID from clinic after BCx positive for MSSA.  Of note, patient did have a few days of diarrhea which had improved on day of admission and resolved since 11/15/20 (C.diff negative). Abdominal imaging (US and CT abd/pelvis) revealed no intraabdominal fluid collection concerning for abscess/infectious process. TTE did not comment on vegetation. CHAMP also did not show any signs of endocarditis.  Last positive BCx on 11/14/20 showed MSSA, and abx was stopped on 12/27/20. He then had a hospitalization 2/15/21-3/17/21 at [facility] for mixed cardiogenic and distributive shock with an intermittent wide complex tachycardia(thought to be 2/2 to 3:1 aflutter with aberrant conduction). Initially requiring high doses of vasopressors and inotropes and was intubated for hypoxemia. LDH severely elevated yet thought to be related to legionella rather than actual pump thrombus. Also had acute liver injury with LFTs in the several thousands. He also required CRRT for acute renal failure. He was treated for legionella pneumonia and ultimately recovered relatively well and was discharged in stable condition not requiring dialysis (cession of HD 3/10). He presents to clinic for follow up. Weight had been prior at around 194 lbs.   Overall he continues to do well from the cardiac standpoint. There is no bleeding  strokelike symptoms there is no worsening driveline site drainage and we did review a picture that was taken yesterday. There are no LVAD alarms or other issues. No dizziness lightheadedness palpitations. There is a little bit of lower extremity edema and her weight is a little bit up but otherwise no issues.    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  SOCIAL HISTORY:  Social History     Socioeconomic History     Marital status:      Spouse name: Not on file     Number of children: Not on file     Years of education: Not on file     Highest education level: Not on file   Occupational History     Not on file   Tobacco Use     Smoking status: Former Smoker     Quit date: 1994     Years since quittin.7     Smokeless tobacco: Never Used   Substance and Sexual Activity     Alcohol use: Not Currently     Drug use: Not Currently     Sexual activity: Not on file   Other Topics Concern     Not on file   Social History Narrative     Not on file     Social Determinants of Health     Financial Resource Strain: Not on file   Food Insecurity: Not on file   Transportation Needs: Not on file   Physical Activity: Not on file   Stress: Not on file   Social Connections: Not on file   Intimate Partner Violence: Not on file   Housing Stability: Not on file     CURRENT MEDICATIONS:  Current Outpatient Medications   Medication     allopurinol (ZYLOPRIM) 100 MG tablet     amiodarone (PACERONE) 200 MG tablet     amLODIPine (NORVASC) 5 MG tablet     aspirin (ASA) 81 MG EC tablet     atorvastatin (LIPITOR) 20 MG tablet     B Complex-C-Folic Acid (VIRT-CAPS) 1 MG CAPS     bumetanide (BUMEX) 1 MG  tablet     cefadroxil (DURICEF) 500 MG capsule     co-enzyme Q-10 200 MG CAPS     finasteride (PROSCAR) 5 MG tablet     FLUoxetine (PROZAC) 10 MG capsule     hydrALAZINE (APRESOLINE) 50 MG tablet     insulin glargine (BASAGLAR KWIKPEN) 100 UNIT/ML pen     insulin pen needle (BD JAIME U/F) 32G X 4 MM miscellaneous     magnesium oxide (MAG-OX) 400 MG tablet     nitroGLYcerin (NITROSTAT) 0.4 MG sublingual tablet     omeprazole (PRILOSEC) 20 MG DR capsule     potassium chloride ER (KLOR-CON M) 20 MEQ CR tablet     senna-docusate (SENOKOT-S/PERICOLACE) 8.6-50 MG tablet     tamsulosin (FLOMAX) 0.4 MG capsule     warfarin ANTICOAGULANT (COUMADIN) 2 MG tablet     warfarin ANTICOAGULANT (COUMADIN) 4 MG tablet     zolpidem (AMBIEN) 5 MG tablet     No current facility-administered medications for this visit.     ROS:   Constitutional: No fever, chills, or sweats.   ENT: No visual disturbance, ear ache, epistaxis, sore throat.   Allergies/Immunologic: Negative.   Respiratory: No cough, hemoptysis.   Cardiovascular: As per HPI.   GI: No nausea, vomiting, hematemesis, melena, or hematochezia.   : No urinary frequency, dysuria, or hematuria.   Integument: Negative.   Psychiatric: Pleasant, no major depression noted  Neuro: No focal neurological deficits noted  Endocrinology: Negative.   Musculoskeletal: As per HPI.      EXAM:  Pressure today was 94 mmHg with occasional aortic valve opening as felt on palpation. Weight is 228 pounds.  General: appears comfortable, alert and oriented  Head: normocephalic, atraumatic  Eyes: anicteric sclera, EOMI , PERRL  Neck: no adenopathy  Orophyarynx: moist mucosa, no lesions noted  Heart: LVAD hum . JVD at 8 cmH2O  Lungs: CTAB, No wheezing.   Abdomen: soft, non-tender, bowel sounds present, no hepatosplenomegaly  Extremities: 1+ LE edema today  Skin: no open lesions noted  Neuro: grossly non-focal     Labs:  Lab Results   Component Value Date    WBC 6.0 08/17/2021    HGB 12.7 (L) 08/17/2021     HCT 40.6 08/17/2021     08/17/2021     08/17/2021    POTASSIUM 3.8 08/17/2021    CHLORIDE 106 08/17/2021    CO2 29 08/17/2021    BUN 33 (H) 08/17/2021    CR 1.55 (H) 08/17/2021     (H) 08/17/2021    SED 72 (H) 02/16/2021    DD 3.08 (H) 08/17/2021    NTBNPI 27,781 (H) 02/15/2021    TROPI 0.377 (HH) 02/15/2021    AST 40 08/17/2021    ALT 46 08/17/2021    ALKPHOS 125 08/17/2021    BILITOTAL 0.6 08/17/2021    INR 2.2 (A) 12/02/2021     RHC 1/07/2021  RA: 5  PA: 24/7/13  PCWP: 3  CO/CI: 5.41/2.64     TTE 2/2021  Technically difficult study.  HM III LVAD is in place at 5500 RPM. Left ventricular diastolic diameter is  4.3cm with a midline septum that has abnormal septal motion consistent with  prior sternotomy and/or conduction abnormalities. The aortic valve opens  intermitently and has mild aortic regurgitation. The LVAD inflow velocities  are not well seen but out flow velocities are normal.  The left ventricular ejection fraction is difficult to quantify due to  difficulty of images but is grossly estimated at 15-20%.  Due to technically difficult images quantification of the right ventricle is  not possible however the right ventricular function appears at least moderate  to severely reduced and dilated grossly.  There is at least mild mitral regurgitation that is eccentric.  IVC diameter >2.1 cm collapsing <50% with sniff suggests a high RA pressure  estimated at 15 mmHg or greater.  Compared to prior imaging on 2/16 there is no significant changes noted though  inflow velocities are not well seen on todays' exam.     ASSESSMENT AND PLAN:  Eliseo Tanner is a 65 year old male with chronic systolic heart failure secondary to NICM now s/p HM 3 on 2/18, moderate CAD, HTN, ABHINAV on CPAP, DM2, CKD Stage III, ANA. His HM3 post-op course was complicated by retrosternal hematoma and bleeding in the lungs, RV failure,VT in ICU now on amiodarone and Afib w/AVR S/p DCCV on 2/28. He had pre-op proteus and  enterococcus bacteremia from 2/13, s/p abx. He presents today for follow up clinic visit.      Patient presenting today 6 months after complicated ICU stay. Now overall doing better. He endorses NYHA Class 1-2 symptoms. His LVAD parameters today are Flow: 4.2  Speed: 5500   PI: 4.5  Power:4.4  there are no flow events on interrogation today.       Chronic SCHF s/p HM III LVAD. Elevated LDH in setting of infection, resolved. Implanted 2/18/20.   Stage D, NYHA Class III  BB: Defer due to history of shock  ACEI.ARB: Defer  HTN: Amlodipine 5mg, will increase to 7.5 mg daily given elevated blood pressure today.  LDH: CTM  Anticoag: Coumadin per pharmacy. Decreased goal to 1.8-2.5 due to recurrent nose bleeding  Antiplaetlet: ASA 81mg qday  SCD: ICD in place and functioning well  Diuresis: We will increase Bumex from 3 mg to 4 mg daily given lower extremity edema and elevated JVD.  Antibx: As per ID team, greatly appreciate assistance.     Moderate CAD  - ASA 81mg  - Lipitor 40mg continue     3:1 atrial flutter with aberrant conduction vs ventricular tachycardia causing wide complex tachycardia   - Continue amioadarone 200mg qday   - CHADASVAC: 3, continue Coumadin     CKD stage IV  - Initially placed on dialysis on 2/2021 due to shock related ATN  - Now off dialysis  - Cr has improved, continue to monitor closely, creatinine had been relatively stable.  - Diuresis as above.      Chronic microcytic/hypochromic anemia. Coagulopathy related to sepsis, resolved. 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   On Coumadin. Plt stable   - continue warfarin     DM Type II, controlled.   A1C 6.8 1/6/21  - Management per PCP.      I appreciate the opportunity to participate in the care of Mr. Tanner. Please do not hesitate to contact me anytime with questions or concerns.  Nader Cisneros MD

## 2021-12-08 NOTE — PATIENT INSTRUCTIONS
Medications:  1.  INCREASE Amlodipine to 7.5 mg daily  2.  INCREASE Bumex to 4 mg daily    Instructions:  1. Full set of labs next week      Follow-up: (make these appointments before you leave)  1. Please follow-up with VAD LISA in Feburary with labs & yearly testing prior.       Page the VAD Coordinator on call if you gain more than 3 lb in a day or 5 in a week. Please also page if you feel unwell or have alarms.     Great to see you in clinic today. To Page the VAD Coordinator on call, dial [phone] option #[phone] and ask to speak to the VAD coordinator on call.

## 2021-12-08 NOTE — LETTER
Mechanical Circulatory Support Program  [place] [address], [facility] [address]  [address] [place] [address]  [phone] Office Phone  [phone] Fax Number    DOPPLER / BP CUFF ORDER FORM  one Doppler and one BP cuff per patient  Please fax this document along with patient s supply order.      Date ___December 8, 2021____  Facility Name:   [facility]     Patient: Eliseo Tanner  Date of Birth:[birth year]    VAD team member: Gladys VANESSA/LVAD coordinator   Phone Number: [phone]  Fax Number: [phone]    Check products desired:    __X__ Ross Sonotrax Vascular Doppler (w/ 8 MHz probe)  ___ X __ Clear Ultrasound Gel    TA425V Blood Pressure Cuff w/ lifetime calibration   Please measure to ensure correct size as equipment cannot be returned.  _X__adult                circumference 9.8  x 15.7        overall length 20.9           ____adult  XL          circumference 13.4  x 19.7      overall length 25.6     ____pediatric          circumference 7.5  x 9.8          overall length 15

## 2021-12-08 NOTE — LETTER
12/8/2021       RE: Eliseo Tanner  [address] [place] [address]       Dear Colleague,    Thank you for the opportunity to participate in the care of your patient, Eliseo Tanner, at the [facility] [place] at [facility]. Please see a copy of my visit note below.    December 8, 2021     Eliseo Tanner is a 66 year old male with chronic systolic heart failure secondary to NICM now s/p HM 3 on 2/18, moderate CAD, HTN, ABHINAV on CPAP, DM2, CKD Stage III, ANA. He presents today for follow up clinic visit.    His HM3 post-op course was complicated by retrosternal hematoma and pulmonary hemorrhage, RV failure,VT in ICU now on amiodarone and Afib w/AVR S/p DCCV on 2/28. He had pre-op proteus and enterococcus bacteremia from 2/13, s/p abx. He had an ICD placed on 3/16/2020 just before his discharge from the hospital. He felt better after his LVAD implantation. On 11/14/20, patient was directly admitted by ID from clinic after BCx positive for MSSA.  Of note, patient did have a few days of diarrhea which had improved on day of admission and resolved since 11/15/20 (C.diff negative). Abdominal imaging (US and CT abd/pelvis) revealed no intraabdominal fluid collection concerning for abscess/infectious process. TTE did not comment on vegetation. CHAMP also did not show any signs of endocarditis.  Last positive BCx on 11/14/20 showed MSSA, and abx was stopped on 12/27/20. He then had a hospitalization 2/15/21-3/17/21 at [facility] for mixed cardiogenic and distributive shock with an intermittent wide complex tachycardia(thought to be 2/2 to 3:1 aflutter with aberrant conduction). Initially requiring high doses of vasopressors and inotropes and was intubated for hypoxemia. LDH severely elevated yet thought to be related to legionella rather than actual pump thrombus. Also had acute liver injury with LFTs in the several thousands. He also required CRRT for acute renal  failure. He was treated for legionella pneumonia and ultimately recovered relatively well and was discharged in stable condition not requiring dialysis (cession of HD 3/10). He presents to clinic for follow up. Weight had been prior at around 194 lbs.   Overall he continues to do well from the cardiac standpoint. There is no bleeding strokelike symptoms there is no worsening driveline site drainage and we did review a picture that was taken yesterday. There are no LVAD alarms or other issues. No dizziness lightheadedness palpitations. There is a little bit of lower extremity edema and her weight is a little bit up but otherwise no issues.    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  SOCIAL HISTORY:  Social History     Socioeconomic History     Marital status:      Spouse name: Not on file     Number of children: Not on file     Years of education: Not on file     Highest education level: Not on file   Occupational History     Not on file   Tobacco Use     Smoking status: Former Smoker     Quit date: 1994     Years since quittin.7     Smokeless tobacco: Never Used   Substance and Sexual Activity     Alcohol use: Not Currently     Drug use: Not Currently     Sexual activity: Not on file   Other Topics Concern     Not on file   Social History Narrative     Not on file     Social Determinants of Health     Financial Resource Strain: Not on file   Food Insecurity: Not on file   Transportation Needs: Not on file   Physical Activity: Not on file   Stress: Not on file   Social Connections: Not on file   Intimate Partner Violence: Not on file   Housing Stability: Not on file      CURRENT MEDICATIONS:  Current Outpatient Medications   Medication     allopurinol (ZYLOPRIM) 100 MG tablet     amiodarone (PACERONE) 200 MG tablet     amLODIPine (NORVASC) 5 MG tablet     aspirin (ASA) 81 MG EC tablet     atorvastatin (LIPITOR) 20 MG tablet     B Complex-C-Folic Acid (VIRT-CAPS) 1 MG CAPS     bumetanide (BUMEX) 1 MG tablet     cefadroxil (DURICEF) 500 MG capsule     co-enzyme Q-10 200 MG CAPS     finasteride (PROSCAR) 5 MG tablet     FLUoxetine (PROZAC) 10 MG capsule     hydrALAZINE (APRESOLINE) 50 MG tablet     insulin glargine (BASAGLAR KWIKPEN) 100 UNIT/ML pen     insulin pen needle (BD JAIME U/F) 32G X 4 MM miscellaneous     magnesium oxide (MAG-OX) 400 MG tablet     nitroGLYcerin (NITROSTAT) 0.4 MG sublingual tablet     omeprazole (PRILOSEC) 20 MG DR capsule     potassium chloride ER (KLOR-CON M) 20 MEQ CR tablet     senna-docusate (SENOKOT-S/PERICOLACE) 8.6-50 MG tablet     tamsulosin (FLOMAX) 0.4 MG capsule     warfarin ANTICOAGULANT (COUMADIN) 2 MG tablet     warfarin ANTICOAGULANT (COUMADIN) 4 MG tablet     zolpidem (AMBIEN) 5 MG tablet     No current facility-administered medications for this visit.     ROS:   Constitutional: No fever, chills, or sweats.   ENT: No visual disturbance, ear ache, epistaxis, sore throat.   Allergies/Immunologic: Negative.   Respiratory: No cough, hemoptysis.   Cardiovascular: As per HPI.   GI: No nausea, vomiting, hematemesis, melena, or hematochezia.   : No urinary frequency, dysuria, or hematuria.   Integument: Negative.   Psychiatric: Pleasant, no major depression noted  Neuro: No focal neurological deficits noted  Endocrinology: Negative.   Musculoskeletal: As per HPI.      EXAM:  Pressure today was 94 mmHg with occasional aortic valve opening as felt on palpation. Weight is 228 pounds.  General: appears comfortable, alert and oriented  Head: normocephalic, atraumatic  Eyes: anicteric sclera, EOMI , PERRL  Neck: no adenopathy  Orophyarynx: moist  mucosa, no lesions noted  Heart: LVAD hum . JVD at 8 cmH2O  Lungs: CTAB, No wheezing.   Abdomen: soft, non-tender, bowel sounds present, no hepatosplenomegaly  Extremities: 1+ LE edema today  Skin: no open lesions noted  Neuro: grossly non-focal     Labs:  Lab Results   Component Value Date    WBC 6.0 08/17/2021    HGB 12.7 (L) 08/17/2021    HCT 40.6 08/17/2021     08/17/2021     08/17/2021    POTASSIUM 3.8 08/17/2021    CHLORIDE 106 08/17/2021    CO2 29 08/17/2021    BUN 33 (H) 08/17/2021    CR 1.55 (H) 08/17/2021     (H) 08/17/2021    SED 72 (H) 02/16/2021    DD 3.08 (H) 08/17/2021    NTBNPI 27,781 (H) 02/15/2021    TROPI 0.377 (HH) 02/15/2021    AST 40 08/17/2021    ALT 46 08/17/2021    ALKPHOS 125 08/17/2021    BILITOTAL 0.6 08/17/2021    INR 2.2 (A) 12/02/2021     RHC 1/07/2021  RA: 5  PA: 24/7/13  PCWP: 3  CO/CI: 5.41/2.64     TTE 2/2021  Technically difficult study.  HM III LVAD is in place at 5500 RPM. Left ventricular diastolic diameter is  4.3cm with a midline septum that has abnormal septal motion consistent with  prior sternotomy and/or conduction abnormalities. The aortic valve opens  intermitently and has mild aortic regurgitation. The LVAD inflow velocities  are not well seen but out flow velocities are normal.  The left ventricular ejection fraction is difficult to quantify due to  difficulty of images but is grossly estimated at 15-20%.  Due to technically difficult images quantification of the right ventricle is  not possible however the right ventricular function appears at least moderate  to severely reduced and dilated grossly.  There is at least mild mitral regurgitation that is eccentric.  IVC diameter >2.1 cm collapsing <50% with sniff suggests a high RA pressure  estimated at 15 mmHg or greater.  Compared to prior imaging on 2/16 there is no significant changes noted though  inflow velocities are not well seen on todays' exam.     ASSESSMENT AND PLAN:  Eliseo Tanner is a  65 year old male with chronic systolic heart failure secondary to NICM now s/p HM 3 on 2/18, moderate CAD, HTN, ABHINAV on CPAP, DM2, CKD Stage III, ANA. His HM3 post-op course was complicated by retrosternal hematoma and bleeding in the lungs, RV failure,VT in ICU now on amiodarone and Afib w/AVR S/p DCCV on 2/28. He had pre-op proteus and enterococcus bacteremia from 2/13, s/p abx. He presents today for follow up clinic visit.      Patient presenting today 6 months after complicated ICU stay. Now overall doing better. He endorses NYHA Class 1-2 symptoms. His LVAD parameters today are Flow: 4.2  Speed: 5500   PI: 4.5  Power:4.4  there are no flow events on interrogation today.       Chronic SCHF s/p HM III LVAD. Elevated LDH in setting of infection, resolved. Implanted 2/18/20.   Stage D, NYHA Class III  BB: Defer due to history of shock  ACEI.ARB: Defer  HTN: Amlodipine 5mg, will increase to 7.5 mg daily given elevated blood pressure today.  LDH: CTM  Anticoag: Coumadin per pharmacy. Decreased goal to 1.8-2.5 due to recurrent nose bleeding  Antiplaetlet: ASA 81mg qday  SCD: ICD in place and functioning well  Diuresis: We will increase Bumex from 3 mg to 4 mg daily given lower extremity edema and elevated JVD.  Antibx: As per ID team, greatly appreciate assistance.     Moderate CAD  - ASA 81mg  - Lipitor 40mg continue     3:1 atrial flutter with aberrant conduction vs ventricular tachycardia causing wide complex tachycardia   - Continue amioadarone 200mg qday   - CHADASVAC: 3, continue Coumadin     CKD stage IV  - Initially placed on dialysis on 2/2021 due to shock related ATN  - Now off dialysis  - Cr has improved, continue to monitor closely, creatinine had been relatively stable.  - Diuresis as above.      Chronic microcytic/hypochromic anemia. Coagulopathy related to sepsis, resolved. 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   On Coumadin. Plt stable    - continue warfarin     DM Type II, controlled.   A1C 6.8 1/6/21  - Management per PCP.      I appreciate the opportunity to participate in the care of Mr. Tanner. Please do not hesitate to contact me anytime with questions or concerns.  Nader Cisneros MD

## 2021-12-10 ENCOUNTER — ANTICOAGULATION THERAPY VISIT (OUTPATIENT)
Dept: ANTICOAGULATION | Facility: CLINIC | Age: 66
End: 2021-12-10
Payer: MEDICARE

## 2021-12-10 DIAGNOSIS — I50.22 CHRONIC SYSTOLIC CONGESTIVE HEART FAILURE (H): ICD-10-CM

## 2021-12-10 DIAGNOSIS — Z95.811 LVAD (LEFT VENTRICULAR ASSIST DEVICE) PRESENT (H): Primary | ICD-10-CM

## 2021-12-10 DIAGNOSIS — I48.0 PAROXYSMAL ATRIAL FIBRILLATION (H): ICD-10-CM

## 2021-12-10 NOTE — PROGRESS NOTES
ANTICOAGULATION MANAGEMENT     Eliseo Tanner 66 year old male is on warfarin with therapeutic INR result. (Goal INR 1.8-2.3)    Recent labs: (last 7 days)     12/09/21  0844   INR 2.1       ASSESSMENT     Source(s): Patient/Caregiver Call       Warfarin doses taken: Warfarin taken as instructed    Diet: No new diet changes identified    New illness, injury, or hospitalization: No    Medication/supplement changes: Amlodipine increased to 7.5mg Daily and Bumex increased to 4mg daily.    Signs or symptoms of bleeding or clotting: No    Previous INR: Therapeutic last 2(+) visits    Additional findings: None     PLAN     Recommended plan for no diet, medication or health factor changes affecting INR     Dosing Instructions: Continue your current warfarin dose with next INR in 2 weeks       Summary  As of 12/10/2021    Full warfarin instructions:  2 mg every Wed, Sat; 4 mg all other days   Next INR check:               Telephone call with Eliseo who verbalizes understanding and agrees to plan and who agrees to plan and repeated back plan correctly    Patient using outside facility for labs    Education provided: None required    Plan made per [facility] anticoagulation protocol and per LVAD protocol    Luiza Perkins RN  Anticoagulation Clinic  12/10/2021    _______________________________________________________________________     Anticoagulation Episode Summary     Current INR goal:     TTR:  75.1 % (10.8 mo)   Target end date:  Indefinite   Send INR reminders to:  [facility]    Indications    LVAD (left ventricular assist device) present (H) [Z95.811]  Chronic systolic congestive heart failure (H) [I50.22]  Paroxysmal atrial fibrillation (H) [I48.0]           Comments:  INR GOAL RANGE CHANGE 8/17/21 1.8-2.3 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 HM 3  placed 2/18/2020, 81mg ASA, Speak to spouse, Veronique at [phone] 4/14/2020:  Lab: [facility]:(p)[phone] (f)  [phone]         Anticoagulation Care Providers     Provider Role Specialty Phone number    Nader Cisneros MD Referring Advanced Heart Failure and Transplant Cardiology [phone]

## 2021-12-20 ENCOUNTER — CARE COORDINATION (OUTPATIENT)
Dept: CARDIOLOGY | Facility: CLINIC | Age: 66
End: 2021-12-20
Payer: MEDICARE

## 2021-12-20 NOTE — PROGRESS NOTES
Clinic labs rcvd.  Discussed results with Dr. Cisneros.  Plan: no changes.  Pt advised.  Pt verbalized understanding of the instructions given.  Will call VAD coordinator with further needs and questions.

## 2021-12-22 DIAGNOSIS — I50.22 CHRONIC SYSTOLIC CONGESTIVE HEART FAILURE (H): Primary | ICD-10-CM

## 2021-12-23 ENCOUNTER — DOCUMENTATION ONLY (OUTPATIENT)
Dept: CARDIOLOGY | Facility: CLINIC | Age: 66
End: 2021-12-23
Payer: MEDICARE

## 2021-12-23 ENCOUNTER — ANTICOAGULATION THERAPY VISIT (OUTPATIENT)
Dept: ANTICOAGULATION | Facility: CLINIC | Age: 66
End: 2021-12-23
Payer: MEDICARE

## 2021-12-23 ENCOUNTER — TRANSFERRED RECORDS (OUTPATIENT)
Dept: HEALTH INFORMATION MANAGEMENT | Facility: CLINIC | Age: 66
End: 2021-12-23
Payer: MEDICARE

## 2021-12-23 DIAGNOSIS — I50.22 CHRONIC SYSTOLIC CONGESTIVE HEART FAILURE (H): ICD-10-CM

## 2021-12-23 DIAGNOSIS — Z95.811 LVAD (LEFT VENTRICULAR ASSIST DEVICE) PRESENT (H): Primary | ICD-10-CM

## 2021-12-23 DIAGNOSIS — I48.0 PAROXYSMAL ATRIAL FIBRILLATION (H): ICD-10-CM

## 2021-12-23 LAB
INR (EXTERNAL): 3.57 (ref 0.9–1.1)
MDC_IDC_LEAD_IMPLANT_DT: NORMAL
MDC_IDC_LEAD_LOCATION: NORMAL
MDC_IDC_LEAD_LOCATION_DETAIL_1: NORMAL
MDC_IDC_LEAD_MFG: NORMAL
MDC_IDC_LEAD_MODEL: NORMAL
MDC_IDC_LEAD_POLARITY_TYPE: NORMAL
MDC_IDC_LEAD_SERIAL: NORMAL
MDC_IDC_LEAD_SPECIAL_FUNCTION: NORMAL
MDC_IDC_MSMT_BATTERY_DTM: NORMAL
MDC_IDC_MSMT_BATTERY_REMAINING_LONGEVITY: 125 MO
MDC_IDC_MSMT_BATTERY_RRT_TRIGGER: 2.73
MDC_IDC_MSMT_BATTERY_STATUS: NORMAL
MDC_IDC_MSMT_BATTERY_VOLTAGE: 3.01 V
MDC_IDC_MSMT_CAP_CHARGE_DTM: NORMAL
MDC_IDC_MSMT_CAP_CHARGE_ENERGY: 18 J
MDC_IDC_MSMT_CAP_CHARGE_TIME: 3.72
MDC_IDC_MSMT_CAP_CHARGE_TYPE: NORMAL
MDC_IDC_MSMT_LEADCHNL_RV_IMPEDANCE_VALUE: 304 OHM
MDC_IDC_MSMT_LEADCHNL_RV_IMPEDANCE_VALUE: 399 OHM
MDC_IDC_MSMT_LEADCHNL_RV_PACING_THRESHOLD_AMPLITUDE: 0.5 V
MDC_IDC_MSMT_LEADCHNL_RV_PACING_THRESHOLD_PULSEWIDTH: 0.4 MS
MDC_IDC_MSMT_LEADCHNL_RV_SENSING_INTR_AMPL: 14.12 MV
MDC_IDC_MSMT_LEADCHNL_RV_SENSING_INTR_AMPL: 14.12 MV
MDC_IDC_PG_IMPLANT_DTM: NORMAL
MDC_IDC_PG_MFG: NORMAL
MDC_IDC_PG_MODEL: NORMAL
MDC_IDC_PG_SERIAL: NORMAL
MDC_IDC_PG_TYPE: NORMAL
MDC_IDC_SESS_CLINIC_NAME: NORMAL
MDC_IDC_SESS_DTM: NORMAL
MDC_IDC_SESS_TYPE: NORMAL
MDC_IDC_SET_BRADY_HYSTRATE: NORMAL
MDC_IDC_SET_BRADY_LOWRATE: 40 {BEATS}/MIN
MDC_IDC_SET_BRADY_MODE: NORMAL
MDC_IDC_SET_LEADCHNL_RV_PACING_AMPLITUDE: 2 V
MDC_IDC_SET_LEADCHNL_RV_PACING_ANODE_ELECTRODE_1: NORMAL
MDC_IDC_SET_LEADCHNL_RV_PACING_ANODE_LOCATION_1: NORMAL
MDC_IDC_SET_LEADCHNL_RV_PACING_CAPTURE_MODE: NORMAL
MDC_IDC_SET_LEADCHNL_RV_PACING_CATHODE_ELECTRODE_1: NORMAL
MDC_IDC_SET_LEADCHNL_RV_PACING_CATHODE_LOCATION_1: NORMAL
MDC_IDC_SET_LEADCHNL_RV_PACING_POLARITY: NORMAL
MDC_IDC_SET_LEADCHNL_RV_PACING_PULSEWIDTH: 0.4 MS
MDC_IDC_SET_LEADCHNL_RV_SENSING_ANODE_ELECTRODE_1: NORMAL
MDC_IDC_SET_LEADCHNL_RV_SENSING_ANODE_LOCATION_1: NORMAL
MDC_IDC_SET_LEADCHNL_RV_SENSING_CATHODE_ELECTRODE_1: NORMAL
MDC_IDC_SET_LEADCHNL_RV_SENSING_CATHODE_LOCATION_1: NORMAL
MDC_IDC_SET_LEADCHNL_RV_SENSING_POLARITY: NORMAL
MDC_IDC_SET_LEADCHNL_RV_SENSING_SENSITIVITY: 0.3 MV
MDC_IDC_SET_ZONE_DETECTION_BEATS_DENOMINATOR: 40 {BEATS}
MDC_IDC_SET_ZONE_DETECTION_BEATS_NUMERATOR: 30 {BEATS}
MDC_IDC_SET_ZONE_DETECTION_INTERVAL: 270 MS
MDC_IDC_SET_ZONE_DETECTION_INTERVAL: 460 MS
MDC_IDC_SET_ZONE_DETECTION_INTERVAL: 500 MS
MDC_IDC_SET_ZONE_DETECTION_INTERVAL: NORMAL
MDC_IDC_SET_ZONE_TYPE: NORMAL
MDC_IDC_STAT_AT_BURDEN_PERCENT: 0 %
MDC_IDC_STAT_AT_DTM_END: NORMAL
MDC_IDC_STAT_AT_DTM_START: NORMAL
MDC_IDC_STAT_BRADY_DTM_END: NORMAL
MDC_IDC_STAT_BRADY_DTM_START: NORMAL
MDC_IDC_STAT_BRADY_RV_PERCENT_PACED: 56.21 %
MDC_IDC_STAT_EPISODE_RECENT_COUNT: 0
MDC_IDC_STAT_EPISODE_RECENT_COUNT_DTM_END: NORMAL
MDC_IDC_STAT_EPISODE_RECENT_COUNT_DTM_START: NORMAL
MDC_IDC_STAT_EPISODE_TOTAL_COUNT: 0
MDC_IDC_STAT_EPISODE_TOTAL_COUNT: 2
MDC_IDC_STAT_EPISODE_TOTAL_COUNT: 232
MDC_IDC_STAT_EPISODE_TOTAL_COUNT_DTM_END: NORMAL
MDC_IDC_STAT_EPISODE_TOTAL_COUNT_DTM_START: NORMAL
MDC_IDC_STAT_EPISODE_TYPE: NORMAL
MDC_IDC_STAT_TACHYTHERAPY_ATP_DELIVERED_RECENT: 0
MDC_IDC_STAT_TACHYTHERAPY_ATP_DELIVERED_TOTAL: 0
MDC_IDC_STAT_TACHYTHERAPY_RECENT_DTM_END: NORMAL
MDC_IDC_STAT_TACHYTHERAPY_RECENT_DTM_START: NORMAL
MDC_IDC_STAT_TACHYTHERAPY_SHOCKS_ABORTED_RECENT: 0
MDC_IDC_STAT_TACHYTHERAPY_SHOCKS_ABORTED_TOTAL: 0
MDC_IDC_STAT_TACHYTHERAPY_SHOCKS_DELIVERED_RECENT: 0
MDC_IDC_STAT_TACHYTHERAPY_SHOCKS_DELIVERED_TOTAL: 0
MDC_IDC_STAT_TACHYTHERAPY_TOTAL_DTM_END: NORMAL
MDC_IDC_STAT_TACHYTHERAPY_TOTAL_DTM_START: NORMAL

## 2021-12-23 NOTE — PROGRESS NOTES
ANTICOAGULATION MANAGEMENT     Eliseo Tanner 66 year old male is on warfarin with supratherapeutic INR result. (Goal INR The patient does not have an INR goal.)    Recent labs: (last 7 days)     12/23/21  0000   INR 3.57*       ASSESSMENT     Source(s): Chart Review and Patient/Caregiver Call       Warfarin doses taken: Warfarin taken as instructed    Diet: No new diet changes identified    New illness, injury, or hospitalization: Yes: Patient has been struggling with fluid retention for the last 2 weeks.    Medication/supplement changes: Bumex dose increased 2 weeks ago    Signs or symptoms of bleeding or clotting: No    Previous INR: Therapeutic last 2(+) visits    Additional findings: None     PLAN     Recommended plan for no diet, medication or health factor changes affecting INR     Dosing Instructions:  Decrease your warfarin dose (7.7% change) with next INR in 1 week       Summary  As of 12/23/2021    Full warfarin instructions:  12/23: 2 mg; Otherwise 2 mg every Mon, Thu, Sat; 4 mg all other days   Next INR check:  12/30/2021             Telephone call with Eliseo who verbalizes understanding and agrees to plan and who agrees to plan and repeated back plan correctly    Patient using outside facility for labs    Education provided: Impact of vitamin K foods on INR, Vitamin K content of foods, Monitoring for bleeding signs and symptoms and When to seek medical attention/emergency care    Plan made per [facility] anticoagulation protocol and per LVAD protocol    Gloria Abbasi RN  Anticoagulation Clinic  12/23/2021    _______________________________________________________________________     Anticoagulation Episode Summary     Current INR goal:     TTR:  73.5 % (10.9 mo)   Target end date:  Indefinite   Send INR [facility]ers to:  [facility]    Indications    LVAD (left ventricular assist device) present (H) [Z95.811]  Chronic systolic congestive heart failure (H) [I50.22]  Paroxysmal atrial fibrillation (H)  [I48.0]           Comments:  INR GOAL RANGE CHANGE 8/17/21 1.8-2.3 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 HM 3  placed 2/18/2020, 81mg ASA, Speak to spouse, Veronique at [phone] 4/14/2020:  Lab: [facility]:(p)[phone] (f) [phone]         Anticoagulation Care Providers     Provider Role Specialty Phone number    Nader Cisneros MD Referring Advanced Heart Failure and Transplant Cardiology [phone]

## 2021-12-26 RX ORDER — HYDRALAZINE HYDROCHLORIDE 50 MG/1
TABLET, FILM COATED ORAL
Qty: 540 TABLET | Refills: 3 | Status: SHIPPED | OUTPATIENT
Start: 2021-12-26 | End: 2022-06-09

## 2021-12-28 DIAGNOSIS — T82.7XXA INFECTION ASSOCIATED WITH DRIVELINE OF LEFT VENTRICULAR ASSIST DEVICE (LVAD) (H): ICD-10-CM

## 2021-12-28 RX ORDER — CEFADROXIL 500 MG/1
500 CAPSULE ORAL 2 TIMES DAILY
Qty: 56 CAPSULE | Refills: 1 | Status: SHIPPED | OUTPATIENT
Start: 2021-12-28 | End: 2022-02-10

## 2021-12-30 ENCOUNTER — ANTICOAGULATION THERAPY VISIT (OUTPATIENT)
Dept: ANTICOAGULATION | Facility: CLINIC | Age: 66
End: 2021-12-30
Payer: MEDICARE

## 2021-12-30 ENCOUNTER — TRANSFERRED RECORDS (OUTPATIENT)
Dept: HEALTH INFORMATION MANAGEMENT | Facility: CLINIC | Age: 66
End: 2021-12-30
Payer: MEDICARE

## 2021-12-30 DIAGNOSIS — Z95.811 LVAD (LEFT VENTRICULAR ASSIST DEVICE) PRESENT (H): Primary | ICD-10-CM

## 2021-12-30 DIAGNOSIS — I50.22 CHRONIC SYSTOLIC CONGESTIVE HEART FAILURE (H): ICD-10-CM

## 2021-12-30 DIAGNOSIS — I48.0 PAROXYSMAL ATRIAL FIBRILLATION (H): ICD-10-CM

## 2021-12-30 LAB — INR (EXTERNAL): 2.68 (ref 0.9–1.1)

## 2021-12-30 NOTE — PROGRESS NOTES
ANTICOAGULATION MANAGEMENT     Eliseo Tanner 66 year old male is on warfarin with supratherapeutic INR result. (Goal INR 1.8-2.3)    Recent labs: (last 7 days)     12/30/21  0000   INR 2.68*       ASSESSMENT     Source(s): Chart Review and Patient/Caregiver Call       Warfarin doses taken: Warfarin taken as instructed    Diet: No new diet changes identified    New illness, injury, or hospitalization: No    Medication/supplement changes: None noted    Signs or symptoms of bleeding or clotting: No    Previous INR: Supratherapeutic    Additional findings: INR trended down nicely will continue with current dose and watch trend.      PLAN     Recommended plan for no diet, medication or health factor changes affecting INR     Dosing Instructions: Continue your current warfarin dose with next INR in 1 week       Summary  As of 12/30/2021    Full warfarin instructions:  2 mg every Mon, Thu, Sat; 4 mg all other days   Next INR check:               Telephone call with  Veronique who verbalizes understanding and agrees to plan and who agrees to plan and repeated back plan correctly    Patient using outside facility for labs    Education provided: Importance of consistent vitamin K intake, Impact of vitamin K foods on INR, Vitamin K content of foods, Importance of therapeutic range and Importance of following up at instructed interval    Plan made per [facility] anticoagulation protocol and per LVAD protocol    Gloria Abbasi RN  Anticoagulation Clinic  12/30/2021    _______________________________________________________________________     Anticoagulation Episode Summary     Current INR goal:     TTR:  72.1 % (10.9 mo)   Target end date:  Indefinite   Send INR reminders to:  [facility]    Indications    LVAD (left ventricular assist device) present (H) [Z95.811]  Chronic systolic congestive heart failure (H) [I50.22]  Paroxysmal atrial fibrillation (H) [I48.0]           Comments:  INR GOAL RANGE CHANGE 8/17/21 1.8-2.3 On  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 HM 3  placed 2/18/2020, 81mg ASA, Speak to spouse, Veronique at [phone] 4/14/2020:  Lab: [facility]:(p)[phone] (f) [phone]         Anticoagulation Care Providers     Provider Role Specialty Phone number    Nader Cisneros MD Referring Advanced Heart Failure and Transplant Cardiology [phone]

## 2022-01-01 ENCOUNTER — TELEPHONE (OUTPATIENT)
Dept: INFECTIOUS DISEASES | Facility: CLINIC | Age: 67
End: 2022-01-01

## 2022-01-01 ENCOUNTER — CARE COORDINATION (OUTPATIENT)
Dept: CARDIOLOGY | Facility: CLINIC | Age: 67
End: 2022-01-01

## 2022-01-01 ENCOUNTER — ANTICOAGULATION THERAPY VISIT (OUTPATIENT)
Dept: ANTICOAGULATION | Facility: CLINIC | Age: 67
End: 2022-01-01

## 2022-01-01 ENCOUNTER — VIRTUAL VISIT (OUTPATIENT)
Dept: INFECTIOUS DISEASES | Facility: CLINIC | Age: 67
End: 2022-01-01
Attending: INTERNAL MEDICINE
Payer: MEDICARE

## 2022-01-01 ENCOUNTER — APPOINTMENT (OUTPATIENT)
Dept: CARDIOLOGY | Facility: CLINIC | Age: 67
DRG: 286 | End: 2022-01-01
Attending: PHYSICIAN ASSISTANT
Payer: MEDICARE

## 2022-01-01 ENCOUNTER — ANESTHESIA EVENT (OUTPATIENT)
Dept: SURGERY | Facility: CLINIC | Age: 67
DRG: 286 | End: 2022-01-01
Payer: MEDICARE

## 2022-01-01 ENCOUNTER — MYC MEDICAL ADVICE (OUTPATIENT)
Dept: ANTICOAGULATION | Facility: CLINIC | Age: 67
End: 2022-01-01

## 2022-01-01 ENCOUNTER — HOSPITAL ENCOUNTER (INPATIENT)
Facility: CLINIC | Age: 67
LOS: 12 days | Discharge: HOME | DRG: 286 | End: 2022-12-20
Attending: EMERGENCY MEDICINE | Admitting: INTERNAL MEDICINE
Payer: MEDICARE

## 2022-01-01 ENCOUNTER — HEALTH MAINTENANCE LETTER (OUTPATIENT)
Age: 67
End: 2022-01-01

## 2022-01-01 ENCOUNTER — OFFICE VISIT (OUTPATIENT)
Dept: CARDIOLOGY | Facility: OTHER | Age: 67
End: 2022-01-01
Attending: STUDENT IN AN ORGANIZED HEALTH CARE EDUCATION/TRAINING PROGRAM
Payer: MEDICARE

## 2022-01-01 ENCOUNTER — APPOINTMENT (OUTPATIENT)
Dept: OCCUPATIONAL THERAPY | Facility: CLINIC | Age: 67
DRG: 291 | End: 2022-01-01
Payer: MEDICARE

## 2022-01-01 ENCOUNTER — LAB (OUTPATIENT)
Dept: LAB | Facility: CLINIC | Age: 67
End: 2022-01-01
Payer: MEDICARE

## 2022-01-01 ENCOUNTER — TELEPHONE (OUTPATIENT)
Dept: CARDIOLOGY | Facility: CLINIC | Age: 67
End: 2022-01-01

## 2022-01-01 ENCOUNTER — APPOINTMENT (OUTPATIENT)
Dept: MEDSURG UNIT | Facility: CLINIC | Age: 67
End: 2022-01-01
Attending: INTERNAL MEDICINE
Payer: MEDICARE

## 2022-01-01 ENCOUNTER — OFFICE VISIT (OUTPATIENT)
Dept: CARDIOLOGY | Facility: CLINIC | Age: 67
End: 2022-01-01
Attending: INTERNAL MEDICINE
Payer: MEDICARE

## 2022-01-01 ENCOUNTER — APPOINTMENT (OUTPATIENT)
Dept: CT IMAGING | Facility: CLINIC | Age: 67
DRG: 286 | End: 2022-01-01
Attending: STUDENT IN AN ORGANIZED HEALTH CARE EDUCATION/TRAINING PROGRAM
Payer: MEDICARE

## 2022-01-01 ENCOUNTER — APPOINTMENT (OUTPATIENT)
Dept: ULTRASOUND IMAGING | Facility: CLINIC | Age: 67
DRG: 286 | End: 2022-01-01
Attending: STUDENT IN AN ORGANIZED HEALTH CARE EDUCATION/TRAINING PROGRAM
Payer: MEDICARE

## 2022-01-01 ENCOUNTER — ANCILLARY PROCEDURE (OUTPATIENT)
Dept: CARDIOLOGY | Facility: CLINIC | Age: 67
DRG: 286 | End: 2022-01-01
Attending: STUDENT IN AN ORGANIZED HEALTH CARE EDUCATION/TRAINING PROGRAM
Payer: MEDICARE

## 2022-01-01 ENCOUNTER — APPOINTMENT (OUTPATIENT)
Dept: PHYSICAL THERAPY | Facility: CLINIC | Age: 67
DRG: 291 | End: 2022-01-01
Payer: MEDICARE

## 2022-01-01 ENCOUNTER — MYC MEDICAL ADVICE (OUTPATIENT)
Dept: INFECTIOUS DISEASES | Facility: CLINIC | Age: 67
End: 2022-01-01

## 2022-01-01 ENCOUNTER — VIRTUAL VISIT (OUTPATIENT)
Dept: PHARMACY | Facility: CLINIC | Age: 67
End: 2022-01-01
Attending: INTERNAL MEDICINE
Payer: COMMERCIAL

## 2022-01-01 ENCOUNTER — APPOINTMENT (OUTPATIENT)
Dept: CARDIOLOGY | Facility: CLINIC | Age: 67
DRG: 291 | End: 2022-01-01
Payer: MEDICARE

## 2022-01-01 ENCOUNTER — APPOINTMENT (OUTPATIENT)
Dept: OCCUPATIONAL THERAPY | Facility: CLINIC | Age: 67
DRG: 291 | End: 2022-01-01
Attending: INTERNAL MEDICINE
Payer: MEDICARE

## 2022-01-01 ENCOUNTER — HOSPITAL ENCOUNTER (EMERGENCY)
Age: 67
End: 2022-01-01
Attending: INTERNAL MEDICINE
Payer: MEDICARE

## 2022-01-01 ENCOUNTER — ALLIED HEALTH/NURSE VISIT (OUTPATIENT)
Dept: CARDIOLOGY | Facility: CLINIC | Age: 67
DRG: 286 | End: 2022-01-01
Attending: INTERNAL MEDICINE
Payer: MEDICARE

## 2022-01-01 ENCOUNTER — TRANSFERRED RECORDS (OUTPATIENT)
Dept: HEALTH INFORMATION MANAGEMENT | Facility: CLINIC | Age: 67
End: 2022-01-01

## 2022-01-01 ENCOUNTER — APPOINTMENT (OUTPATIENT)
Dept: GENERAL RADIOLOGY | Facility: CLINIC | Age: 67
DRG: 291 | End: 2022-01-01
Payer: MEDICARE

## 2022-01-01 ENCOUNTER — ANESTHESIA (OUTPATIENT)
Dept: SURGERY | Facility: CLINIC | Age: 67
DRG: 286 | End: 2022-01-01
Payer: MEDICARE

## 2022-01-01 ENCOUNTER — APPOINTMENT (OUTPATIENT)
Dept: LAB | Facility: CLINIC | Age: 67
End: 2022-01-01
Attending: INTERNAL MEDICINE
Payer: MEDICARE

## 2022-01-01 ENCOUNTER — HOSPITAL ENCOUNTER (OUTPATIENT)
Facility: CLINIC | Age: 67
Discharge: HOME | End: 2022-09-20
Attending: INTERNAL MEDICINE | Admitting: INTERNAL MEDICINE
Payer: MEDICARE

## 2022-01-01 ENCOUNTER — PATIENT OUTREACH (OUTPATIENT)
Dept: CARE COORDINATION | Facility: CLINIC | Age: 67
End: 2022-01-01

## 2022-01-01 ENCOUNTER — TELEPHONE (OUTPATIENT)
Dept: ANTICOAGULATION | Facility: CLINIC | Age: 67
End: 2022-01-01

## 2022-01-01 ENCOUNTER — HOSPITAL ENCOUNTER (INPATIENT)
Facility: CLINIC | Age: 67
LOS: 9 days | Discharge: HOME | DRG: 291 | End: 2022-09-11
Attending: EMERGENCY MEDICINE | Admitting: INTERNAL MEDICINE
Payer: MEDICARE

## 2022-01-01 VITALS
BODY MASS INDEX: 29.74 KG/M2 | HEART RATE: 71 BPM | SYSTOLIC BLOOD PRESSURE: 70 MMHG | TEMPERATURE: 98.1 F | OXYGEN SATURATION: 98 % | DIASTOLIC BLOOD PRESSURE: 50 MMHG | HEIGHT: 68 IN | WEIGHT: 196.21 LBS

## 2022-01-01 VITALS
OXYGEN SATURATION: 98 % | BODY MASS INDEX: 27.1 KG/M2 | RESPIRATION RATE: 16 BRPM | SYSTOLIC BLOOD PRESSURE: 102 MMHG | WEIGHT: 176.1 LBS | TEMPERATURE: 98.1 F | DIASTOLIC BLOOD PRESSURE: 74 MMHG | HEART RATE: 60 BPM

## 2022-01-01 VITALS
WEIGHT: 178.3 LBS | OXYGEN SATURATION: 96 % | HEIGHT: 67 IN | DIASTOLIC BLOOD PRESSURE: 71 MMHG | BODY MASS INDEX: 27.99 KG/M2 | SYSTOLIC BLOOD PRESSURE: 81 MMHG | TEMPERATURE: 97.9 F | RESPIRATION RATE: 18 BRPM | HEART RATE: 62 BPM

## 2022-01-01 VITALS
BODY MASS INDEX: 30.17 KG/M2 | WEIGHT: 196.1 LBS | OXYGEN SATURATION: 98 % | HEART RATE: 71 BPM | SYSTOLIC BLOOD PRESSURE: 80 MMHG

## 2022-01-01 VITALS
HEART RATE: 60 BPM | TEMPERATURE: 97.7 F | OXYGEN SATURATION: 95 % | DIASTOLIC BLOOD PRESSURE: 60 MMHG | RESPIRATION RATE: 20 BRPM | WEIGHT: 180 LBS | SYSTOLIC BLOOD PRESSURE: 79 MMHG | BODY MASS INDEX: 28.19 KG/M2

## 2022-01-01 VITALS
OXYGEN SATURATION: 96 % | WEIGHT: 191.3 LBS | BODY MASS INDEX: 28.99 KG/M2 | HEIGHT: 68 IN | SYSTOLIC BLOOD PRESSURE: 70 MMHG | HEART RATE: 63 BPM

## 2022-01-01 DIAGNOSIS — Z95.811 LVAD (LEFT VENTRICULAR ASSIST DEVICE) PRESENT (H): Primary | ICD-10-CM

## 2022-01-01 DIAGNOSIS — I50.22 CHRONIC SYSTOLIC CONGESTIVE HEART FAILURE (H): ICD-10-CM

## 2022-01-01 DIAGNOSIS — I50.20 HEART FAILURE WITH REDUCED EJECTION FRACTION, NYHA CLASS III (H): ICD-10-CM

## 2022-01-01 DIAGNOSIS — I42.8 NONISCHEMIC CARDIOMYOPATHY (H): ICD-10-CM

## 2022-01-01 DIAGNOSIS — L08.9 WOUND INFECTION: ICD-10-CM

## 2022-01-01 DIAGNOSIS — I50.22 CHRONIC SYSTOLIC CONGESTIVE HEART FAILURE (H): Primary | ICD-10-CM

## 2022-01-01 DIAGNOSIS — I48.0 PAROXYSMAL ATRIAL FIBRILLATION (H): ICD-10-CM

## 2022-01-01 DIAGNOSIS — T82.9XXS LEFT VENTRICULAR ASSIST DEVICE (LVAD) COMPLICATION, SEQUELA: ICD-10-CM

## 2022-01-01 DIAGNOSIS — Z11.52 ENCOUNTER FOR SCREENING LABORATORY TESTING FOR SEVERE ACUTE RESPIRATORY SYNDROME CORONAVIRUS 2 (SARS-COV-2): ICD-10-CM

## 2022-01-01 DIAGNOSIS — I50.23 ACUTE ON CHRONIC SYSTOLIC CONGESTIVE HEART FAILURE (H): ICD-10-CM

## 2022-01-01 DIAGNOSIS — I50.22 CHRONIC SYSTOLIC HEART FAILURE (H): ICD-10-CM

## 2022-01-01 DIAGNOSIS — T82.7XXA INFECTION ASSOCIATED WITH DRIVELINE OF LEFT VENTRICULAR ASSIST DEVICE (LVAD) (H): Primary | ICD-10-CM

## 2022-01-01 DIAGNOSIS — Z95.811 LVAD (LEFT VENTRICULAR ASSIST DEVICE) PRESENT (H): ICD-10-CM

## 2022-01-01 DIAGNOSIS — E87.70 HYPERVOLEMIA, UNSPECIFIED HYPERVOLEMIA TYPE: Primary | ICD-10-CM

## 2022-01-01 DIAGNOSIS — I10 BENIGN ESSENTIAL HYPERTENSION: ICD-10-CM

## 2022-01-01 DIAGNOSIS — Z79.2 ENCOUNTER FOR LONG-TERM (CURRENT) USE OF ANTIBIOTICS: ICD-10-CM

## 2022-01-01 DIAGNOSIS — I25.5 ISCHEMIC CARDIOMYOPATHY: ICD-10-CM

## 2022-01-01 DIAGNOSIS — Z95.811 HEART REPLACED BY HEART ASSIST DEVICE (H): ICD-10-CM

## 2022-01-01 DIAGNOSIS — N18.4 CKD (CHRONIC KIDNEY DISEASE) STAGE 4, GFR 15-29 ML/MIN (H): ICD-10-CM

## 2022-01-01 DIAGNOSIS — I50.84 ACC/AHA STAGE D HEART FAILURE (H): ICD-10-CM

## 2022-01-01 DIAGNOSIS — E87.70 HYPERVOLEMIA, UNSPECIFIED HYPERVOLEMIA TYPE: ICD-10-CM

## 2022-01-01 DIAGNOSIS — E78.2 MIXED HYPERLIPIDEMIA: ICD-10-CM

## 2022-01-01 DIAGNOSIS — R52 PAIN: ICD-10-CM

## 2022-01-01 DIAGNOSIS — I50.23 ACUTE ON CHRONIC SYSTOLIC CONGESTIVE HEART FAILURE (H): Primary | ICD-10-CM

## 2022-01-01 DIAGNOSIS — E03.9 HYPOTHYROIDISM, UNSPECIFIED TYPE: ICD-10-CM

## 2022-01-01 DIAGNOSIS — N18.32 STAGE 3B CHRONIC KIDNEY DISEASE (H): ICD-10-CM

## 2022-01-01 DIAGNOSIS — I50.814 RIGHT HEART FAILURE SECONDARY TO LEFT HEART FAILURE (H): ICD-10-CM

## 2022-01-01 DIAGNOSIS — Z79.2 CHRONIC ANTIBIOTIC SUPPRESSION: ICD-10-CM

## 2022-01-01 DIAGNOSIS — R78.81 BACTEREMIA: Primary | ICD-10-CM

## 2022-01-01 DIAGNOSIS — D50.9 IRON DEFICIENCY ANEMIA, UNSPECIFIED IRON DEFICIENCY ANEMIA TYPE: ICD-10-CM

## 2022-01-01 DIAGNOSIS — Z23 NEED FOR VACCINATION: ICD-10-CM

## 2022-01-01 DIAGNOSIS — I50.84 ACC/AHA STAGE D HEART FAILURE (H): Primary | ICD-10-CM

## 2022-01-01 DIAGNOSIS — T14.8XXA WOUND INFECTION: ICD-10-CM

## 2022-01-01 DIAGNOSIS — I10 BENIGN ESSENTIAL HYPERTENSION: Primary | ICD-10-CM

## 2022-01-01 LAB
ABO/RH(D): NORMAL
ALBUMIN SERPL BCG-MCNC: 2.8 G/DL (ref 3.5–5.2)
ALBUMIN SERPL BCG-MCNC: 2.9 G/DL (ref 3.5–5.2)
ALBUMIN SERPL BCG-MCNC: 3 G/DL (ref 3.5–5.2)
ALBUMIN SERPL BCG-MCNC: 3.1 G/DL (ref 3.5–5.2)
ALBUMIN SERPL BCG-MCNC: 3.2 G/DL (ref 3.5–5.2)
ALBUMIN SERPL BCG-MCNC: 3.3 G/DL (ref 3.5–5.2)
ALBUMIN SERPL BCG-MCNC: 3.5 G/DL (ref 3.5–5.2)
ALBUMIN SERPL BCG-MCNC: 3.5 G/DL (ref 3.5–5.2)
ALBUMIN SERPL BCG-MCNC: 3.8 G/DL (ref 3.5–5.2)
ALBUMIN SERPL BCG-MCNC: 4 G/DL (ref 3.5–5.2)
ALP SERPL-CCNC: 175 U/L (ref 40–129)
ALP SERPL-CCNC: 213 U/L (ref 40–129)
ALP SERPL-CCNC: 234 U/L (ref 40–129)
ALP SERPL-CCNC: 245 U/L (ref 40–129)
ALP SERPL-CCNC: 260 U/L (ref 40–129)
ALP SERPL-CCNC: 268 U/L (ref 40–129)
ALP SERPL-CCNC: 272 U/L (ref 40–129)
ALP SERPL-CCNC: 277 U/L (ref 40–129)
ALP SERPL-CCNC: 278 U/L (ref 40–129)
ALP SERPL-CCNC: 282 U/L (ref 40–129)
ALP SERPL-CCNC: 285 U/L (ref 40–129)
ALP SERPL-CCNC: 288 U/L (ref 40–129)
ALP SERPL-CCNC: 299 U/L (ref 40–129)
ALP SERPL-CCNC: 299 U/L (ref 40–129)
ALP SERPL-CCNC: 306 U/L (ref 40–129)
ALP SERPL-CCNC: 308 U/L (ref 40–129)
ALP SERPL-CCNC: 330 U/L (ref 40–129)
ALT SERPL W P-5'-P-CCNC: 107 U/L (ref 10–50)
ALT SERPL W P-5'-P-CCNC: 108 U/L (ref 10–50)
ALT SERPL W P-5'-P-CCNC: 119 U/L (ref 10–50)
ALT SERPL W P-5'-P-CCNC: 122 U/L (ref 10–50)
ALT SERPL W P-5'-P-CCNC: 124 U/L (ref 10–50)
ALT SERPL W P-5'-P-CCNC: 128 U/L (ref 10–50)
ALT SERPL W P-5'-P-CCNC: 131 U/L (ref 10–50)
ALT SERPL W P-5'-P-CCNC: 34 U/L (ref 10–50)
ALT SERPL W P-5'-P-CCNC: 44 U/L (ref 10–50)
ALT SERPL W P-5'-P-CCNC: 48 U/L (ref 10–50)
ALT SERPL W P-5'-P-CCNC: 51 U/L (ref 10–50)
ALT SERPL W P-5'-P-CCNC: 55 U/L (ref 10–50)
ALT SERPL W P-5'-P-CCNC: 69 U/L (ref 10–50)
ALT SERPL W P-5'-P-CCNC: 76 U/L (ref 10–50)
ALT SERPL W P-5'-P-CCNC: 81 U/L (ref 10–50)
ALT SERPL W P-5'-P-CCNC: 88 U/L (ref 10–50)
ALT SERPL W P-5'-P-CCNC: 88 U/L (ref 10–50)
ANION GAP SERPL CALCULATED.3IONS-SCNC: 10 MMOL/L (ref 7–15)
ANION GAP SERPL CALCULATED.3IONS-SCNC: 11 MMOL/L (ref 7–15)
ANION GAP SERPL CALCULATED.3IONS-SCNC: 12 MMOL/L (ref 7–15)
ANION GAP SERPL CALCULATED.3IONS-SCNC: 13 MMOL/L (ref 7–15)
ANION GAP SERPL CALCULATED.3IONS-SCNC: 14 MMOL/L (ref 7–15)
ANION GAP SERPL CALCULATED.3IONS-SCNC: 15 MMOL/L (ref 7–15)
ANION GAP SERPL CALCULATED.3IONS-SCNC: 16 MMOL/L (ref 7–15)
ANION GAP SERPL CALCULATED.3IONS-SCNC: 16 MMOL/L (ref 7–15)
ANION GAP SERPL CALCULATED.3IONS-SCNC: 23 MMOL/L (ref 7–15)
ANION GAP SERPL CALCULATED.3IONS-SCNC: 6 MMOL/L (ref 7–15)
ANION GAP SERPL CALCULATED.3IONS-SCNC: 7 MMOL/L (ref 7–15)
ANION GAP SERPL CALCULATED.3IONS-SCNC: 8 MMOL/L (ref 7–15)
ANION GAP SERPL CALCULATED.3IONS-SCNC: 9 MMOL/L (ref 7–15)
ANION GAP SERPL CALCULATED.3IONS-SCNC: 9 MMOL/L (ref 7–15)
ANTIBODY SCREEN: NEGATIVE
AST SERPL W P-5'-P-CCNC: 118 U/L (ref 10–50)
AST SERPL W P-5'-P-CCNC: 120 U/L (ref 10–50)
AST SERPL W P-5'-P-CCNC: 131 U/L (ref 10–50)
AST SERPL W P-5'-P-CCNC: 131 U/L (ref 10–50)
AST SERPL W P-5'-P-CCNC: 132 U/L (ref 10–50)
AST SERPL W P-5'-P-CCNC: 142 U/L (ref 10–50)
AST SERPL W P-5'-P-CCNC: 143 U/L (ref 10–50)
AST SERPL W P-5'-P-CCNC: 149 U/L (ref 10–50)
AST SERPL W P-5'-P-CCNC: 151 U/L (ref 10–50)
AST SERPL W P-5'-P-CCNC: 151 U/L (ref 10–50)
AST SERPL W P-5'-P-CCNC: 153 U/L (ref 10–50)
AST SERPL W P-5'-P-CCNC: 158 U/L (ref 10–50)
AST SERPL W P-5'-P-CCNC: 159 U/L (ref 10–50)
AST SERPL W P-5'-P-CCNC: 168 U/L (ref 10–50)
AST SERPL W P-5'-P-CCNC: 93 U/L (ref 10–50)
AST SERPL W P-5'-P-CCNC: 93 U/L (ref 10–50)
AST SERPL W P-5'-P-CCNC: 96 U/L (ref 10–50)
ATRIAL RATE - MUSE: 40 BPM
BACTERIA BLD CULT: NO GROWTH
BACTERIA BLD CULT: NO GROWTH
BACTERIA SPEC CULT: ABNORMAL
BACTERIA SPEC CULT: NORMAL
BACTERIA WND CULT: ABNORMAL
BACTERIA WND CULT: NORMAL
BASE EXCESS BLDV CALC-SCNC: 2.9 MMOL/L (ref -7.7–1.9)
BASOPHILS # BLD AUTO: 0 10E3/UL (ref 0–0.2)
BASOPHILS # BLD AUTO: 0.1 10E3/UL (ref 0–0.2)
BASOPHILS NFR BLD AUTO: 0 %
BASOPHILS NFR BLD AUTO: 1 %
BILIRUB SERPL-MCNC: 0.4 MG/DL
BILIRUB SERPL-MCNC: 0.5 MG/DL
BILIRUB SERPL-MCNC: 0.6 MG/DL
BILIRUB SERPL-MCNC: 0.7 MG/DL
BILIRUB SERPL-MCNC: 0.7 MG/DL
BLD PROD TYP BPU: NORMAL
BLOOD COMPONENT TYPE: NORMAL
BUN SERPL-MCNC: 25.6 MG/DL (ref 8–23)
BUN SERPL-MCNC: 26.3 MG/DL (ref 8–23)
BUN SERPL-MCNC: 26.6 MG/DL (ref 8–23)
BUN SERPL-MCNC: 27.6 MG/DL (ref 8–23)
BUN SERPL-MCNC: 27.7 MG/DL (ref 8–23)
BUN SERPL-MCNC: 27.8 MG/DL (ref 8–23)
BUN SERPL-MCNC: 28.8 MG/DL (ref 8–23)
BUN SERPL-MCNC: 30.1 MG/DL (ref 8–23)
BUN SERPL-MCNC: 30.3 MG/DL (ref 8–23)
BUN SERPL-MCNC: 30.4 MG/DL (ref 8–23)
BUN SERPL-MCNC: 32.1 MG/DL (ref 8–23)
BUN SERPL-MCNC: 36 MG/DL (ref 8–23)
BUN SERPL-MCNC: 41.3 MG/DL (ref 8–23)
BUN SERPL-MCNC: 42.5 MG/DL (ref 8–23)
BUN SERPL-MCNC: 42.8 MG/DL (ref 8–23)
BUN SERPL-MCNC: 43.3 MG/DL (ref 8–23)
BUN SERPL-MCNC: 43.7 MG/DL (ref 8–23)
BUN SERPL-MCNC: 45.1 MG/DL (ref 8–23)
BUN SERPL-MCNC: 45.5 MG/DL (ref 8–23)
BUN SERPL-MCNC: 47 MG/DL (ref 8–23)
BUN SERPL-MCNC: 47 MG/DL (ref 8–23)
BUN SERPL-MCNC: 47.2 MG/DL (ref 8–23)
BUN SERPL-MCNC: 48.8 MG/DL (ref 8–23)
BUN SERPL-MCNC: 49.2 MG/DL (ref 8–23)
BUN SERPL-MCNC: 49.4 MG/DL (ref 8–23)
BUN SERPL-MCNC: 49.7 MG/DL (ref 8–23)
BUN SERPL-MCNC: 50.5 MG/DL (ref 8–23)
BUN SERPL-MCNC: 50.7 MG/DL (ref 8–23)
BUN SERPL-MCNC: 50.8 MG/DL (ref 8–23)
BUN SERPL-MCNC: 50.8 MG/DL (ref 8–23)
BUN SERPL-MCNC: 50.9 MG/DL (ref 8–23)
BUN SERPL-MCNC: 51 MG/DL (ref 8–23)
BUN SERPL-MCNC: 51.1 MG/DL (ref 8–23)
BUN SERPL-MCNC: 51.2 MG/DL (ref 8–23)
BUN SERPL-MCNC: 51.3 MG/DL (ref 8–23)
BUN SERPL-MCNC: 52 MG/DL (ref 8–23)
BUN SERPL-MCNC: 52.1 MG/DL (ref 8–23)
BUN SERPL-MCNC: 52.3 MG/DL (ref 8–23)
BUN SERPL-MCNC: 52.5 MG/DL (ref 8–23)
BUN SERPL-MCNC: 52.7 MG/DL (ref 8–23)
BUN SERPL-MCNC: 53.3 MG/DL (ref 8–23)
BUN SERPL-MCNC: 53.7 MG/DL (ref 8–23)
BUN SERPL-MCNC: 54.3 MG/DL (ref 8–23)
BUN SERPL-MCNC: 63.3 MG/DL (ref 8–23)
BUN SERPL-MCNC: 66.6 MG/DL (ref 8–23)
CALCIUM SERPL-MCNC: 7.8 MG/DL (ref 8.8–10.2)
CALCIUM SERPL-MCNC: 8.1 MG/DL (ref 8.8–10.2)
CALCIUM SERPL-MCNC: 8.2 MG/DL (ref 8.8–10.2)
CALCIUM SERPL-MCNC: 8.2 MG/DL (ref 8.8–10.2)
CALCIUM SERPL-MCNC: 8.3 MG/DL (ref 8.8–10.2)
CALCIUM SERPL-MCNC: 8.4 MG/DL (ref 8.8–10.2)
CALCIUM SERPL-MCNC: 8.5 MG/DL (ref 8.8–10.2)
CALCIUM SERPL-MCNC: 8.6 MG/DL (ref 8.8–10.2)
CALCIUM SERPL-MCNC: 8.7 MG/DL (ref 8.8–10.2)
CALCIUM SERPL-MCNC: 8.8 MG/DL (ref 8.8–10.2)
CALCIUM SERPL-MCNC: 8.9 MG/DL (ref 8.8–10.2)
CALCIUM SERPL-MCNC: 9 MG/DL (ref 8.8–10.2)
CALCIUM SERPL-MCNC: 9 MG/DL (ref 8.8–10.2)
CALCIUM SERPL-MCNC: 9.1 MG/DL (ref 8.8–10.2)
CALCIUM SERPL-MCNC: 9.2 MG/DL (ref 8.8–10.2)
CALCIUM SERPL-MCNC: 9.3 MG/DL (ref 8.8–10.2)
CALCIUM SERPL-MCNC: 9.4 MG/DL (ref 8.8–10.2)
CALCIUM SERPL-MCNC: 9.5 MG/DL (ref 8.8–10.2)
CHLORIDE SERPL-SCNC: 100 MMOL/L (ref 98–107)
CHLORIDE SERPL-SCNC: 101 MMOL/L (ref 98–107)
CHLORIDE SERPL-SCNC: 102 MMOL/L (ref 98–107)
CHLORIDE SERPL-SCNC: 102 MMOL/L (ref 98–107)
CHLORIDE SERPL-SCNC: 103 MMOL/L (ref 98–107)
CHLORIDE SERPL-SCNC: 105 MMOL/L (ref 98–107)
CHLORIDE SERPL-SCNC: 93 MMOL/L (ref 98–107)
CHLORIDE SERPL-SCNC: 94 MMOL/L (ref 98–107)
CHLORIDE SERPL-SCNC: 95 MMOL/L (ref 98–107)
CHLORIDE SERPL-SCNC: 96 MMOL/L (ref 98–107)
CHLORIDE SERPL-SCNC: 97 MMOL/L (ref 98–107)
CHLORIDE SERPL-SCNC: 98 MMOL/L (ref 98–107)
CHLORIDE SERPL-SCNC: 99 MMOL/L (ref 98–107)
CHOLEST SERPL-MCNC: 103 MG/DL
CK SERPL-CCNC: 34 U/L (ref 39–308)
CODING SYSTEM: NORMAL
CREAT SERPL-MCNC: 1.35 MG/DL (ref 0.67–1.17)
CREAT SERPL-MCNC: 1.35 MG/DL (ref 0.67–1.17)
CREAT SERPL-MCNC: 1.38 MG/DL (ref 0.67–1.17)
CREAT SERPL-MCNC: 1.39 MG/DL (ref 0.67–1.17)
CREAT SERPL-MCNC: 1.41 MG/DL (ref 0.67–1.17)
CREAT SERPL-MCNC: 1.42 MG/DL (ref 0.67–1.17)
CREAT SERPL-MCNC: 1.44 MG/DL (ref 0.67–1.17)
CREAT SERPL-MCNC: 1.45 MG/DL (ref 0.67–1.17)
CREAT SERPL-MCNC: 1.46 MG/DL (ref 0.67–1.17)
CREAT SERPL-MCNC: 1.47 MG/DL (ref 0.67–1.17)
CREAT SERPL-MCNC: 1.52 MG/DL (ref 0.67–1.17)
CREAT SERPL-MCNC: 1.59 MG/DL (ref 0.67–1.17)
CREAT SERPL-MCNC: 1.61 MG/DL (ref 0.67–1.17)
CREAT SERPL-MCNC: 1.64 MG/DL (ref 0.67–1.17)
CREAT SERPL-MCNC: 1.68 MG/DL (ref 0.67–1.17)
CREAT SERPL-MCNC: 1.69 MG/DL (ref 0.67–1.17)
CREAT SERPL-MCNC: 1.7 MG/DL (ref 0.67–1.17)
CREAT SERPL-MCNC: 1.7 MG/DL (ref 0.67–1.17)
CREAT SERPL-MCNC: 1.72 MG/DL (ref 0.67–1.17)
CREAT SERPL-MCNC: 1.73 MG/DL (ref 0.67–1.17)
CREAT SERPL-MCNC: 1.74 MG/DL (ref 0.67–1.17)
CREAT SERPL-MCNC: 1.74 MG/DL (ref 0.67–1.17)
CREAT SERPL-MCNC: 1.76 MG/DL (ref 0.67–1.17)
CREAT SERPL-MCNC: 1.78 MG/DL (ref 0.67–1.17)
CREAT SERPL-MCNC: 1.78 MG/DL (ref 0.67–1.17)
CREAT SERPL-MCNC: 1.79 MG/DL (ref 0.67–1.17)
CREAT SERPL-MCNC: 1.81 MG/DL (ref 0.67–1.17)
CREAT SERPL-MCNC: 1.81 MG/DL (ref 0.67–1.17)
CREAT SERPL-MCNC: 1.84 MG/DL (ref 0.67–1.17)
CREAT SERPL-MCNC: 1.84 MG/DL (ref 0.67–1.17)
CREAT SERPL-MCNC: 1.86 MG/DL (ref 0.67–1.17)
CREAT SERPL-MCNC: 1.86 MG/DL (ref 0.67–1.17)
CREAT SERPL-MCNC: 1.87 MG/DL (ref 0.67–1.17)
CREAT SERPL-MCNC: 1.96 MG/DL (ref 0.67–1.17)
CREAT SERPL-MCNC: 1.99 MG/DL (ref 0.67–1.17)
CREAT SERPL-MCNC: 2 MG/DL (ref 0.67–1.17)
CREAT SERPL-MCNC: 2.01 MG/DL (ref 0.67–1.17)
CREAT SERPL-MCNC: 2.14 MG/DL (ref 0.67–1.17)
CRP SERPL-MCNC: 26.3 MG/L
CRP SERPL-MCNC: 31.5 MG/L
CYSTATIN C (ROCHE): 2.2 MG/L (ref 0.6–1)
D DIMER PPP FEU-MCNC: 2.71 UG/ML FEU (ref 0–0.5)
D DIMER PPP FEU-MCNC: 3.33 UG/ML FEU (ref 0–0.5)
DEPRECATED HCO3 PLAS-SCNC: 20 MMOL/L (ref 22–29)
DEPRECATED HCO3 PLAS-SCNC: 22 MMOL/L (ref 22–29)
DEPRECATED HCO3 PLAS-SCNC: 23 MMOL/L (ref 22–29)
DEPRECATED HCO3 PLAS-SCNC: 24 MMOL/L (ref 22–29)
DEPRECATED HCO3 PLAS-SCNC: 25 MMOL/L (ref 22–29)
DEPRECATED HCO3 PLAS-SCNC: 26 MMOL/L (ref 22–29)
DEPRECATED HCO3 PLAS-SCNC: 27 MMOL/L (ref 22–29)
DEPRECATED HCO3 PLAS-SCNC: 28 MMOL/L (ref 22–29)
DEPRECATED HCO3 PLAS-SCNC: 29 MMOL/L (ref 22–29)
DEPRECATED HCO3 PLAS-SCNC: 30 MMOL/L (ref 22–29)
DEPRECATED HCO3 PLAS-SCNC: 31 MMOL/L (ref 22–29)
DEPRECATED HCO3 PLAS-SCNC: 32 MMOL/L (ref 22–29)
DEPRECATED HCO3 PLAS-SCNC: 32 MMOL/L (ref 22–29)
DEPRECATED HCO3 PLAS-SCNC: 34 MMOL/L (ref 22–29)
DEPRECATED HCO3 PLAS-SCNC: 35 MMOL/L (ref 22–29)
DEPRECATED HCO3 PLAS-SCNC: 36 MMOL/L (ref 22–29)
DIASTOLIC BLOOD PRESSURE - MUSE: NORMAL MMHG
EOSINOPHIL # BLD AUTO: 0.1 10E3/UL (ref 0–0.7)
EOSINOPHIL # BLD AUTO: 0.1 10E3/UL (ref 0–0.7)
EOSINOPHIL # BLD AUTO: 0.2 10E3/UL (ref 0–0.7)
EOSINOPHIL # BLD AUTO: 0.3 10E3/UL (ref 0–0.7)
EOSINOPHIL # BLD AUTO: 0.4 10E3/UL (ref 0–0.7)
EOSINOPHIL # BLD AUTO: 0.4 10E3/UL (ref 0–0.7)
EOSINOPHIL # BLD AUTO: 0.5 10E3/UL (ref 0–0.7)
EOSINOPHIL # BLD AUTO: 0.5 10E3/UL (ref 0–0.7)
EOSINOPHIL NFR BLD AUTO: 2 %
EOSINOPHIL NFR BLD AUTO: 3 %
EOSINOPHIL NFR BLD AUTO: 3 %
EOSINOPHIL NFR BLD AUTO: 4 %
EOSINOPHIL NFR BLD AUTO: 5 %
EOSINOPHIL NFR BLD AUTO: 5 %
EOSINOPHIL NFR BLD AUTO: 6 %
EOSINOPHIL NFR BLD AUTO: 6 %
EOSINOPHIL NFR BLD AUTO: 7 %
ERYTHROCYTE [DISTWIDTH] IN BLOOD BY AUTOMATED COUNT: 15.5 % (ref 10–15)
ERYTHROCYTE [DISTWIDTH] IN BLOOD BY AUTOMATED COUNT: 15.7 % (ref 10–15)
ERYTHROCYTE [DISTWIDTH] IN BLOOD BY AUTOMATED COUNT: 15.8 % (ref 10–15)
ERYTHROCYTE [DISTWIDTH] IN BLOOD BY AUTOMATED COUNT: 15.8 % (ref 10–15)
ERYTHROCYTE [DISTWIDTH] IN BLOOD BY AUTOMATED COUNT: 15.9 % (ref 10–15)
ERYTHROCYTE [DISTWIDTH] IN BLOOD BY AUTOMATED COUNT: 16 % (ref 10–15)
ERYTHROCYTE [DISTWIDTH] IN BLOOD BY AUTOMATED COUNT: 16.1 % (ref 10–15)
ERYTHROCYTE [DISTWIDTH] IN BLOOD BY AUTOMATED COUNT: 16.3 % (ref 10–15)
ERYTHROCYTE [DISTWIDTH] IN BLOOD BY AUTOMATED COUNT: 16.6 % (ref 10–15)
ERYTHROCYTE [DISTWIDTH] IN BLOOD BY AUTOMATED COUNT: 16.7 % (ref 10–15)
ERYTHROCYTE [DISTWIDTH] IN BLOOD BY AUTOMATED COUNT: 16.7 % (ref 10–15)
ERYTHROCYTE [DISTWIDTH] IN BLOOD BY AUTOMATED COUNT: 16.9 % (ref 10–15)
ERYTHROCYTE [DISTWIDTH] IN BLOOD BY AUTOMATED COUNT: 18.9 % (ref 10–15)
ERYTHROCYTE [SEDIMENTATION RATE] IN BLOOD BY WESTERGREN METHOD: 49 MM/HR (ref 0–20)
GFR SERPL CREATININE-BSD FRML MDRD: 26 ML/MIN/1.73M2
GFR SERPL CREATININE-BSD FRML MDRD: 33 ML/MIN/1.73M2
GFR SERPL CREATININE-BSD FRML MDRD: 35 ML/MIN/1.73M2
GFR SERPL CREATININE-BSD FRML MDRD: 35 ML/MIN/1.73M2
GFR SERPL CREATININE-BSD FRML MDRD: 36 ML/MIN/1.73M2
GFR SERPL CREATININE-BSD FRML MDRD: 36 ML/MIN/1.73M2
GFR SERPL CREATININE-BSD FRML MDRD: 38 ML/MIN/1.73M2
GFR SERPL CREATININE-BSD FRML MDRD: 39 ML/MIN/1.73M2
GFR SERPL CREATININE-BSD FRML MDRD: 40 ML/MIN/1.73M2
GFR SERPL CREATININE-BSD FRML MDRD: 40 ML/MIN/1.73M2
GFR SERPL CREATININE-BSD FRML MDRD: 41 ML/MIN/1.73M2
GFR SERPL CREATININE-BSD FRML MDRD: 42 ML/MIN/1.73M2
GFR SERPL CREATININE-BSD FRML MDRD: 43 ML/MIN/1.73M2
GFR SERPL CREATININE-BSD FRML MDRD: 44 ML/MIN/1.73M2
GFR SERPL CREATININE-BSD FRML MDRD: 45 ML/MIN/1.73M2
GFR SERPL CREATININE-BSD FRML MDRD: 46 ML/MIN/1.73M2
GFR SERPL CREATININE-BSD FRML MDRD: 47 ML/MIN/1.73M2
GFR SERPL CREATININE-BSD FRML MDRD: 49 ML/MIN/1.73M2
GFR SERPL CREATININE-BSD FRML MDRD: 51 ML/MIN/1.73M2
GFR SERPL CREATININE-BSD FRML MDRD: 52 ML/MIN/1.73M2
GFR SERPL CREATININE-BSD FRML MDRD: 52 ML/MIN/1.73M2
GFR SERPL CREATININE-BSD FRML MDRD: 53 ML/MIN/1.73M2
GFR SERPL CREATININE-BSD FRML MDRD: 53 ML/MIN/1.73M2
GFR SERPL CREATININE-BSD FRML MDRD: 54 ML/MIN/1.73M2
GFR SERPL CREATININE-BSD FRML MDRD: 55 ML/MIN/1.73M2
GFR SERPL CREATININE-BSD FRML MDRD: 55 ML/MIN/1.73M2
GFR SERPL CREATININE-BSD FRML MDRD: 57 ML/MIN/1.73M2
GFR SERPL CREATININE-BSD FRML MDRD: 57 ML/MIN/1.73M2
GLUCOSE BLDC GLUCOMTR-MCNC: 102 MG/DL (ref 70–99)
GLUCOSE BLDC GLUCOMTR-MCNC: 103 MG/DL (ref 70–99)
GLUCOSE BLDC GLUCOMTR-MCNC: 104 MG/DL (ref 70–99)
GLUCOSE BLDC GLUCOMTR-MCNC: 104 MG/DL (ref 70–99)
GLUCOSE BLDC GLUCOMTR-MCNC: 105 MG/DL (ref 70–99)
GLUCOSE BLDC GLUCOMTR-MCNC: 106 MG/DL (ref 70–99)
GLUCOSE BLDC GLUCOMTR-MCNC: 108 MG/DL (ref 70–99)
GLUCOSE BLDC GLUCOMTR-MCNC: 110 MG/DL (ref 70–99)
GLUCOSE BLDC GLUCOMTR-MCNC: 110 MG/DL (ref 70–99)
GLUCOSE BLDC GLUCOMTR-MCNC: 111 MG/DL (ref 70–99)
GLUCOSE BLDC GLUCOMTR-MCNC: 112 MG/DL (ref 70–99)
GLUCOSE BLDC GLUCOMTR-MCNC: 113 MG/DL (ref 70–99)
GLUCOSE BLDC GLUCOMTR-MCNC: 115 MG/DL (ref 70–99)
GLUCOSE BLDC GLUCOMTR-MCNC: 116 MG/DL (ref 70–99)
GLUCOSE BLDC GLUCOMTR-MCNC: 118 MG/DL (ref 70–99)
GLUCOSE BLDC GLUCOMTR-MCNC: 118 MG/DL (ref 70–99)
GLUCOSE BLDC GLUCOMTR-MCNC: 119 MG/DL (ref 70–99)
GLUCOSE BLDC GLUCOMTR-MCNC: 120 MG/DL (ref 70–99)
GLUCOSE BLDC GLUCOMTR-MCNC: 121 MG/DL (ref 70–99)
GLUCOSE BLDC GLUCOMTR-MCNC: 122 MG/DL (ref 70–99)
GLUCOSE BLDC GLUCOMTR-MCNC: 124 MG/DL (ref 70–99)
GLUCOSE BLDC GLUCOMTR-MCNC: 125 MG/DL (ref 70–99)
GLUCOSE BLDC GLUCOMTR-MCNC: 126 MG/DL (ref 70–99)
GLUCOSE BLDC GLUCOMTR-MCNC: 127 MG/DL (ref 70–99)
GLUCOSE BLDC GLUCOMTR-MCNC: 128 MG/DL (ref 70–99)
GLUCOSE BLDC GLUCOMTR-MCNC: 129 MG/DL (ref 70–99)
GLUCOSE BLDC GLUCOMTR-MCNC: 132 MG/DL (ref 70–99)
GLUCOSE BLDC GLUCOMTR-MCNC: 133 MG/DL (ref 70–99)
GLUCOSE BLDC GLUCOMTR-MCNC: 134 MG/DL (ref 70–99)
GLUCOSE BLDC GLUCOMTR-MCNC: 135 MG/DL (ref 70–99)
GLUCOSE BLDC GLUCOMTR-MCNC: 137 MG/DL (ref 70–99)
GLUCOSE BLDC GLUCOMTR-MCNC: 138 MG/DL (ref 70–99)
GLUCOSE BLDC GLUCOMTR-MCNC: 143 MG/DL (ref 70–99)
GLUCOSE BLDC GLUCOMTR-MCNC: 145 MG/DL (ref 70–99)
GLUCOSE BLDC GLUCOMTR-MCNC: 146 MG/DL (ref 70–99)
GLUCOSE BLDC GLUCOMTR-MCNC: 147 MG/DL (ref 70–99)
GLUCOSE BLDC GLUCOMTR-MCNC: 148 MG/DL (ref 70–99)
GLUCOSE BLDC GLUCOMTR-MCNC: 150 MG/DL (ref 70–99)
GLUCOSE BLDC GLUCOMTR-MCNC: 151 MG/DL (ref 70–99)
GLUCOSE BLDC GLUCOMTR-MCNC: 151 MG/DL (ref 70–99)
GLUCOSE BLDC GLUCOMTR-MCNC: 152 MG/DL (ref 70–99)
GLUCOSE BLDC GLUCOMTR-MCNC: 154 MG/DL (ref 70–99)
GLUCOSE BLDC GLUCOMTR-MCNC: 155 MG/DL (ref 70–99)
GLUCOSE BLDC GLUCOMTR-MCNC: 156 MG/DL (ref 70–99)
GLUCOSE BLDC GLUCOMTR-MCNC: 156 MG/DL (ref 70–99)
GLUCOSE BLDC GLUCOMTR-MCNC: 159 MG/DL (ref 70–99)
GLUCOSE BLDC GLUCOMTR-MCNC: 160 MG/DL (ref 70–99)
GLUCOSE BLDC GLUCOMTR-MCNC: 166 MG/DL (ref 70–99)
GLUCOSE BLDC GLUCOMTR-MCNC: 168 MG/DL (ref 70–99)
GLUCOSE BLDC GLUCOMTR-MCNC: 168 MG/DL (ref 70–99)
GLUCOSE BLDC GLUCOMTR-MCNC: 170 MG/DL (ref 70–99)
GLUCOSE BLDC GLUCOMTR-MCNC: 174 MG/DL (ref 70–99)
GLUCOSE BLDC GLUCOMTR-MCNC: 176 MG/DL (ref 70–99)
GLUCOSE BLDC GLUCOMTR-MCNC: 178 MG/DL (ref 70–99)
GLUCOSE BLDC GLUCOMTR-MCNC: 180 MG/DL (ref 70–99)
GLUCOSE BLDC GLUCOMTR-MCNC: 181 MG/DL (ref 70–99)
GLUCOSE BLDC GLUCOMTR-MCNC: 182 MG/DL (ref 70–99)
GLUCOSE BLDC GLUCOMTR-MCNC: 192 MG/DL (ref 70–99)
GLUCOSE BLDC GLUCOMTR-MCNC: 196 MG/DL (ref 70–99)
GLUCOSE BLDC GLUCOMTR-MCNC: 231 MG/DL (ref 70–99)
GLUCOSE BLDC GLUCOMTR-MCNC: 80 MG/DL (ref 70–99)
GLUCOSE BLDC GLUCOMTR-MCNC: 94 MG/DL (ref 70–99)
GLUCOSE BLDC GLUCOMTR-MCNC: 94 MG/DL (ref 70–99)
GLUCOSE BLDC GLUCOMTR-MCNC: 99 MG/DL (ref 70–99)
GLUCOSE SERPL-MCNC: 101 MG/DL (ref 70–99)
GLUCOSE SERPL-MCNC: 102 MG/DL (ref 70–99)
GLUCOSE SERPL-MCNC: 103 MG/DL (ref 70–99)
GLUCOSE SERPL-MCNC: 104 MG/DL (ref 70–99)
GLUCOSE SERPL-MCNC: 106 MG/DL (ref 70–99)
GLUCOSE SERPL-MCNC: 107 MG/DL (ref 70–99)
GLUCOSE SERPL-MCNC: 108 MG/DL (ref 70–99)
GLUCOSE SERPL-MCNC: 108 MG/DL (ref 70–99)
GLUCOSE SERPL-MCNC: 109 MG/DL (ref 70–99)
GLUCOSE SERPL-MCNC: 109 MG/DL (ref 70–99)
GLUCOSE SERPL-MCNC: 117 MG/DL (ref 70–99)
GLUCOSE SERPL-MCNC: 117 MG/DL (ref 70–99)
GLUCOSE SERPL-MCNC: 118 MG/DL (ref 70–99)
GLUCOSE SERPL-MCNC: 121 MG/DL (ref 70–99)
GLUCOSE SERPL-MCNC: 124 MG/DL (ref 70–99)
GLUCOSE SERPL-MCNC: 125 MG/DL (ref 70–99)
GLUCOSE SERPL-MCNC: 127 MG/DL (ref 70–99)
GLUCOSE SERPL-MCNC: 129 MG/DL (ref 70–99)
GLUCOSE SERPL-MCNC: 135 MG/DL (ref 70–99)
GLUCOSE SERPL-MCNC: 138 MG/DL (ref 70–99)
GLUCOSE SERPL-MCNC: 144 MG/DL (ref 70–99)
GLUCOSE SERPL-MCNC: 145 MG/DL (ref 70–99)
GLUCOSE SERPL-MCNC: 153 MG/DL (ref 70–99)
GLUCOSE SERPL-MCNC: 155 MG/DL (ref 70–99)
GLUCOSE SERPL-MCNC: 156 MG/DL (ref 70–99)
GLUCOSE SERPL-MCNC: 160 MG/DL (ref 70–99)
GLUCOSE SERPL-MCNC: 165 MG/DL (ref 70–99)
GLUCOSE SERPL-MCNC: 165 MG/DL (ref 70–99)
GLUCOSE SERPL-MCNC: 167 MG/DL (ref 70–99)
GLUCOSE SERPL-MCNC: 175 MG/DL (ref 70–99)
GLUCOSE SERPL-MCNC: 180 MG/DL (ref 70–99)
GLUCOSE SERPL-MCNC: 181 MG/DL (ref 70–99)
GLUCOSE SERPL-MCNC: 194 MG/DL (ref 70–99)
GLUCOSE SERPL-MCNC: 196 MG/DL (ref 70–99)
GLUCOSE SERPL-MCNC: 207 MG/DL (ref 70–99)
GLUCOSE SERPL-MCNC: 292 MG/DL (ref 70–99)
GLUCOSE SERPL-MCNC: 89 MG/DL (ref 70–99)
GLUCOSE SERPL-MCNC: 93 MG/DL (ref 70–99)
GLUCOSE SERPL-MCNC: 96 MG/DL (ref 70–99)
GLUCOSE SERPL-MCNC: 96 MG/DL (ref 70–99)
GLUCOSE SERPL-MCNC: 97 MG/DL (ref 70–99)
GLUCOSE SERPL-MCNC: 98 MG/DL (ref 70–99)
GLUCOSE SERPL-MCNC: 98 MG/DL (ref 70–99)
GRAM STAIN RESULT: ABNORMAL
GRAM STAIN RESULT: NORMAL
GRAM STAIN RESULT: NORMAL
HBA1C MFR BLD: 6.3 %
HCO3 BLDV-SCNC: 27 MMOL/L (ref 21–28)
HCT VFR BLD AUTO: 29.5 % (ref 40–53)
HCT VFR BLD AUTO: 29.8 % (ref 40–53)
HCT VFR BLD AUTO: 30.1 % (ref 40–53)
HCT VFR BLD AUTO: 30.3 % (ref 40–53)
HCT VFR BLD AUTO: 30.5 % (ref 40–53)
HCT VFR BLD AUTO: 32.3 % (ref 40–53)
HCT VFR BLD AUTO: 37.4 % (ref 40–53)
HCT VFR BLD AUTO: 37.9 % (ref 40–53)
HCT VFR BLD AUTO: 39.2 % (ref 40–53)
HCT VFR BLD AUTO: 39.4 % (ref 40–53)
HCT VFR BLD AUTO: 39.9 % (ref 40–53)
HCT VFR BLD AUTO: 40 % (ref 40–53)
HCT VFR BLD AUTO: 40.1 % (ref 40–53)
HCT VFR BLD AUTO: 40.2 % (ref 40–53)
HCT VFR BLD AUTO: 40.3 % (ref 40–53)
HCT VFR BLD AUTO: 40.4 % (ref 40–53)
HCT VFR BLD AUTO: 40.4 % (ref 40–53)
HCT VFR BLD AUTO: 40.6 % (ref 40–53)
HCT VFR BLD AUTO: 40.7 % (ref 40–53)
HCT VFR BLD AUTO: 40.8 % (ref 40–53)
HCT VFR BLD AUTO: 40.9 % (ref 40–53)
HCT VFR BLD AUTO: 41.2 % (ref 40–53)
HCT VFR BLD AUTO: 42.5 % (ref 40–53)
HCT VFR BLD AUTO: 42.7 % (ref 40–53)
HCT VFR BLD AUTO: 43.3 % (ref 40–53)
HCT VFR BLD AUTO: 45 % (ref 40–53)
HCT VFR BLD AUTO: 45.4 % (ref 40–53)
HDLC SERPL-MCNC: 50 MG/DL
HGB BLD-MCNC: 10.2 G/DL (ref 13.3–17.7)
HGB BLD-MCNC: 10.6 G/DL (ref 13.3–17.7)
HGB BLD-MCNC: 11.6 G/DL (ref 13.3–17.7)
HGB BLD-MCNC: 12.3 G/DL (ref 13.3–17.7)
HGB BLD-MCNC: 12.4 G/DL (ref 13.3–17.7)
HGB BLD-MCNC: 12.5 G/DL (ref 13.3–17.7)
HGB BLD-MCNC: 12.7 G/DL (ref 13.3–17.7)
HGB BLD-MCNC: 12.8 G/DL (ref 13.3–17.7)
HGB BLD-MCNC: 12.8 G/DL (ref 13.3–17.7)
HGB BLD-MCNC: 12.9 G/DL (ref 13.3–17.7)
HGB BLD-MCNC: 13 G/DL (ref 13.3–17.7)
HGB BLD-MCNC: 13.1 G/DL (ref 13.3–17.7)
HGB BLD-MCNC: 13.2 G/DL (ref 13.3–17.7)
HGB BLD-MCNC: 13.3 G/DL (ref 13.3–17.7)
HGB BLD-MCNC: 13.4 G/DL (ref 13.3–17.7)
HGB BLD-MCNC: 13.5 G/DL (ref 13.3–17.7)
HGB BLD-MCNC: 13.5 G/DL (ref 13.3–17.7)
HGB BLD-MCNC: 13.8 G/DL (ref 13.3–17.7)
HGB BLD-MCNC: 14.2 G/DL (ref 13.3–17.7)
HGB BLD-MCNC: 14.7 G/DL (ref 13.3–17.7)
HGB BLD-MCNC: 9.1 G/DL (ref 13.3–17.7)
HGB BLD-MCNC: 9.2 G/DL (ref 13.3–17.7)
HGB BLD-MCNC: 9.3 G/DL (ref 13.3–17.7)
HGB BLD-MCNC: 9.3 G/DL (ref 13.3–17.7)
HGB BLD-MCNC: 9.4 G/DL (ref 13.3–17.7)
HGB BLD-MCNC: 9.4 G/DL (ref 13.3–17.7)
HGB BLD-MCNC: 9.5 G/DL (ref 13.3–17.7)
HGB BLD-MCNC: 9.5 G/DL (ref 13.3–17.7)
HGB BLD-MCNC: 9.6 G/DL (ref 13.3–17.7)
HGB BLD-MCNC: 9.7 G/DL (ref 13.3–17.7)
HOLD SPECIMEN: NORMAL
IMM GRANULOCYTES # BLD: 0.1 10E3/UL
IMM GRANULOCYTES # BLD: 0.2 10E3/UL
IMM GRANULOCYTES NFR BLD: 1 %
IMM GRANULOCYTES NFR BLD: 2 %
IMM GRANULOCYTES NFR BLD: 3 %
INR (EXTERNAL): 1.3 (ref 0.9–1.1)
INR (EXTERNAL): 1.7 (ref 0.9–1.1)
INR (EXTERNAL): 1.8 (ref 0.9–1.1)
INR (EXTERNAL): 2.5 (ref 0.9–1.1)
INR HOME MONITORING: 1.2 (ref 1.8–2.3)
INR HOME MONITORING: 1.3 (ref 1.8–2.3)
INR HOME MONITORING: 1.4 (ref 1.8–2.3)
INR HOME MONITORING: 1.5 (ref 1.8–2.3)
INR HOME MONITORING: 1.7 (ref 1.8–2.3)
INR HOME MONITORING: 2.1 (ref 1.8–2.3)
INR PPP: 1.21 (ref 0.85–1.15)
INR PPP: 1.22 (ref 0.85–1.15)
INR PPP: 1.22 (ref 0.85–1.15)
INR PPP: 1.32 (ref 0.85–1.15)
INR PPP: 1.33 (ref 0.85–1.15)
INR PPP: 1.39 (ref 0.85–1.15)
INR PPP: 1.65 (ref 0.85–1.15)
INR PPP: 1.72 (ref 0.85–1.15)
INR PPP: 1.83 (ref 0.85–1.15)
INR PPP: 1.86 (ref 0.85–1.15)
INR PPP: 1.92 (ref 0.85–1.15)
INR PPP: 1.95 (ref 0.85–1.15)
INR PPP: 1.97 (ref 0.85–1.15)
INR PPP: 1.98 (ref 0.85–1.15)
INR PPP: 2.07 (ref 0.85–1.15)
INR PPP: 2.08 (ref 0.85–1.15)
INR PPP: 2.16 (ref 0.85–1.15)
INR PPP: 2.19 (ref 0.85–1.15)
INR PPP: 2.22 (ref 0.85–1.15)
INR PPP: 2.25 (ref 0.85–1.15)
INR PPP: 2.28 (ref 0.85–1.15)
INR PPP: 2.38 (ref 0.85–1.15)
INR PPP: 2.39 (ref 0.85–1.15)
INR PPP: 2.41 (ref 0.85–1.15)
INR PPP: 2.57 (ref 0.85–1.15)
INR PPP: 2.73 (ref 0.85–1.15)
INTERPRETATION ECG - MUSE: NORMAL
ISSUE DATE AND TIME: NORMAL
LDH SERPL L TO P-CCNC: 273 U/L (ref 0–250)
LDH SERPL L TO P-CCNC: 283 U/L (ref 0–250)
LDH SERPL L TO P-CCNC: 290 U/L (ref 0–250)
LDH SERPL L TO P-CCNC: 309 U/L (ref 0–250)
LDH SERPL L TO P-CCNC: 310 U/L (ref 0–250)
LDH SERPL L TO P-CCNC: 348 U/L (ref 0–250)
LDH SERPL L TO P-CCNC: 364 U/L (ref 0–250)
LDH SERPL L TO P-CCNC: NORMAL U/L
LDLC SERPL CALC-MCNC: 42 MG/DL
LVEF ECHO: NORMAL
LVEF ECHO: NORMAL
LYMPHOCYTES # BLD AUTO: 0.5 10E3/UL (ref 0.8–5.3)
LYMPHOCYTES # BLD AUTO: 0.6 10E3/UL (ref 0.8–5.3)
LYMPHOCYTES # BLD AUTO: 0.6 10E3/UL (ref 0.8–5.3)
LYMPHOCYTES # BLD AUTO: 0.7 10E3/UL (ref 0.8–5.3)
LYMPHOCYTES # BLD AUTO: 0.8 10E3/UL (ref 0.8–5.3)
LYMPHOCYTES # BLD AUTO: 0.8 10E3/UL (ref 0.8–5.3)
LYMPHOCYTES # BLD AUTO: 0.9 10E3/UL (ref 0.8–5.3)
LYMPHOCYTES # BLD AUTO: 1 10E3/UL (ref 0.8–5.3)
LYMPHOCYTES # BLD AUTO: 1.1 10E3/UL (ref 0.8–5.3)
LYMPHOCYTES # BLD AUTO: 1.2 10E3/UL (ref 0.8–5.3)
LYMPHOCYTES NFR BLD AUTO: 10 %
LYMPHOCYTES NFR BLD AUTO: 12 %
LYMPHOCYTES NFR BLD AUTO: 13 %
LYMPHOCYTES NFR BLD AUTO: 14 %
LYMPHOCYTES NFR BLD AUTO: 15 %
LYMPHOCYTES NFR BLD AUTO: 8 %
LYMPHOCYTES NFR BLD AUTO: 9 %
MAGNESIUM SERPL-MCNC: 1.7 MG/DL (ref 1.7–2.3)
MAGNESIUM SERPL-MCNC: 1.8 MG/DL (ref 1.7–2.3)
MAGNESIUM SERPL-MCNC: 1.9 MG/DL (ref 1.7–2.3)
MAGNESIUM SERPL-MCNC: 2 MG/DL (ref 1.7–2.3)
MAGNESIUM SERPL-MCNC: 2.1 MG/DL (ref 1.7–2.3)
MAGNESIUM SERPL-MCNC: 2.2 MG/DL (ref 1.7–2.3)
MCH RBC QN AUTO: 29 PG (ref 26.5–33)
MCH RBC QN AUTO: 29.1 PG (ref 26.5–33)
MCH RBC QN AUTO: 29.3 PG (ref 26.5–33)
MCH RBC QN AUTO: 29.4 PG (ref 26.5–33)
MCH RBC QN AUTO: 29.5 PG (ref 26.5–33)
MCH RBC QN AUTO: 29.6 PG (ref 26.5–33)
MCH RBC QN AUTO: 29.7 PG (ref 26.5–33)
MCH RBC QN AUTO: 29.7 PG (ref 26.5–33)
MCH RBC QN AUTO: 29.8 PG (ref 26.5–33)
MCH RBC QN AUTO: 29.9 PG (ref 26.5–33)
MCH RBC QN AUTO: 30 PG (ref 26.5–33)
MCH RBC QN AUTO: 30.2 PG (ref 26.5–33)
MCH RBC QN AUTO: 30.2 PG (ref 26.5–33)
MCH RBC QN AUTO: 30.3 PG (ref 26.5–33)
MCH RBC QN AUTO: 30.5 PG (ref 26.5–33)
MCH RBC QN AUTO: 30.5 PG (ref 26.5–33)
MCH RBC QN AUTO: 30.6 PG (ref 26.5–33)
MCHC RBC AUTO-ENTMCNC: 30.5 G/DL (ref 31.5–36.5)
MCHC RBC AUTO-ENTMCNC: 30.9 G/DL (ref 31.5–36.5)
MCHC RBC AUTO-ENTMCNC: 31.2 G/DL (ref 31.5–36.5)
MCHC RBC AUTO-ENTMCNC: 31.2 G/DL (ref 31.5–36.5)
MCHC RBC AUTO-ENTMCNC: 31.3 G/DL (ref 31.5–36.5)
MCHC RBC AUTO-ENTMCNC: 31.3 G/DL (ref 31.5–36.5)
MCHC RBC AUTO-ENTMCNC: 31.4 G/DL (ref 31.5–36.5)
MCHC RBC AUTO-ENTMCNC: 31.5 G/DL (ref 31.5–36.5)
MCHC RBC AUTO-ENTMCNC: 31.6 G/DL (ref 31.5–36.5)
MCHC RBC AUTO-ENTMCNC: 31.6 G/DL (ref 31.5–36.5)
MCHC RBC AUTO-ENTMCNC: 31.7 G/DL (ref 31.5–36.5)
MCHC RBC AUTO-ENTMCNC: 31.7 G/DL (ref 31.5–36.5)
MCHC RBC AUTO-ENTMCNC: 31.9 G/DL (ref 31.5–36.5)
MCHC RBC AUTO-ENTMCNC: 32 G/DL (ref 31.5–36.5)
MCHC RBC AUTO-ENTMCNC: 32.3 G/DL (ref 31.5–36.5)
MCHC RBC AUTO-ENTMCNC: 32.7 G/DL (ref 31.5–36.5)
MCHC RBC AUTO-ENTMCNC: 32.8 G/DL (ref 31.5–36.5)
MCHC RBC AUTO-ENTMCNC: 32.9 G/DL (ref 31.5–36.5)
MCHC RBC AUTO-ENTMCNC: 32.9 G/DL (ref 31.5–36.5)
MCHC RBC AUTO-ENTMCNC: 33 G/DL (ref 31.5–36.5)
MCHC RBC AUTO-ENTMCNC: 33.6 G/DL (ref 31.5–36.5)
MCHC RBC AUTO-ENTMCNC: 33.6 G/DL (ref 31.5–36.5)
MCV RBC AUTO: 89 FL (ref 78–100)
MCV RBC AUTO: 90 FL (ref 78–100)
MCV RBC AUTO: 91 FL (ref 78–100)
MCV RBC AUTO: 92 FL (ref 78–100)
MCV RBC AUTO: 93 FL (ref 78–100)
MCV RBC AUTO: 94 FL (ref 78–100)
MCV RBC AUTO: 95 FL (ref 78–100)
MCV RBC AUTO: 95 FL (ref 78–100)
MCV RBC AUTO: 96 FL (ref 78–100)
MCV RBC AUTO: 98 FL (ref 78–100)
MCV RBC AUTO: 98 FL (ref 78–100)
MDC_IDC_LEAD_IMPLANT_DT: NORMAL
MDC_IDC_LEAD_LOCATION: NORMAL
MDC_IDC_LEAD_LOCATION_DETAIL_1: NORMAL
MDC_IDC_LEAD_MFG: NORMAL
MDC_IDC_LEAD_MODEL: NORMAL
MDC_IDC_LEAD_POLARITY_TYPE: NORMAL
MDC_IDC_LEAD_SERIAL: NORMAL
MDC_IDC_LEAD_SPECIAL_FUNCTION: NORMAL
MDC_IDC_MSMT_BATTERY_DTM: NORMAL
MDC_IDC_MSMT_BATTERY_REMAINING_LONGEVITY: 111 MO
MDC_IDC_MSMT_BATTERY_REMAINING_LONGEVITY: 114 MO
MDC_IDC_MSMT_BATTERY_REMAINING_LONGEVITY: 115 MO
MDC_IDC_MSMT_BATTERY_RRT_TRIGGER: 2.73
MDC_IDC_MSMT_BATTERY_STATUS: NORMAL
MDC_IDC_MSMT_BATTERY_VOLTAGE: 3 V
MDC_IDC_MSMT_BATTERY_VOLTAGE: 3 V
MDC_IDC_MSMT_BATTERY_VOLTAGE: 3.01 V
MDC_IDC_MSMT_CAP_CHARGE_DTM: NORMAL
MDC_IDC_MSMT_CAP_CHARGE_ENERGY: 18 J
MDC_IDC_MSMT_CAP_CHARGE_TIME: 3.71
MDC_IDC_MSMT_CAP_CHARGE_TIME: 3.71
MDC_IDC_MSMT_CAP_CHARGE_TIME: 3.74
MDC_IDC_MSMT_CAP_CHARGE_TYPE: NORMAL
MDC_IDC_MSMT_LEADCHNL_RV_IMPEDANCE_VALUE: 285 OHM
MDC_IDC_MSMT_LEADCHNL_RV_IMPEDANCE_VALUE: 304 OHM
MDC_IDC_MSMT_LEADCHNL_RV_IMPEDANCE_VALUE: 399 OHM
MDC_IDC_MSMT_LEADCHNL_RV_IMPEDANCE_VALUE: 399 OHM
MDC_IDC_MSMT_LEADCHNL_RV_IMPEDANCE_VALUE: 418 OHM
MDC_IDC_MSMT_LEADCHNL_RV_PACING_THRESHOLD_AMPLITUDE: 0.75 V
MDC_IDC_MSMT_LEADCHNL_RV_PACING_THRESHOLD_AMPLITUDE: 0.75 V
MDC_IDC_MSMT_LEADCHNL_RV_PACING_THRESHOLD_AMPLITUDE: 1 V
MDC_IDC_MSMT_LEADCHNL_RV_PACING_THRESHOLD_PULSEWIDTH: 0.4 MS
MDC_IDC_MSMT_LEADCHNL_RV_SENSING_INTR_AMPL: 11 MV
MDC_IDC_MSMT_LEADCHNL_RV_SENSING_INTR_AMPL: 11.6 MV
MDC_IDC_MSMT_LEADCHNL_RV_SENSING_INTR_AMPL: 14 MV
MDC_IDC_MSMT_LEADCHNL_RV_SENSING_INTR_AMPL: 9.5 MV
MDC_IDC_PG_IMPLANT_DTM: NORMAL
MDC_IDC_PG_MFG: NORMAL
MDC_IDC_PG_MODEL: NORMAL
MDC_IDC_PG_SERIAL: NORMAL
MDC_IDC_PG_TYPE: NORMAL
MDC_IDC_SESS_CLINIC_NAME: NORMAL
MDC_IDC_SESS_DTM: NORMAL
MDC_IDC_SESS_TYPE: NORMAL
MDC_IDC_SET_BRADY_HYSTRATE: NORMAL
MDC_IDC_SET_BRADY_LOWRATE: 60 {BEATS}/MIN
MDC_IDC_SET_BRADY_MAX_SENSOR_RATE: 115 {BEATS}/MIN
MDC_IDC_SET_BRADY_MODE: NORMAL
MDC_IDC_SET_LEADCHNL_RV_PACING_AMPLITUDE: 2 V
MDC_IDC_SET_LEADCHNL_RV_PACING_ANODE_ELECTRODE_1: NORMAL
MDC_IDC_SET_LEADCHNL_RV_PACING_ANODE_LOCATION_1: NORMAL
MDC_IDC_SET_LEADCHNL_RV_PACING_CAPTURE_MODE: NORMAL
MDC_IDC_SET_LEADCHNL_RV_PACING_CATHODE_ELECTRODE_1: NORMAL
MDC_IDC_SET_LEADCHNL_RV_PACING_CATHODE_LOCATION_1: NORMAL
MDC_IDC_SET_LEADCHNL_RV_PACING_POLARITY: NORMAL
MDC_IDC_SET_LEADCHNL_RV_PACING_PULSEWIDTH: 0.4 MS
MDC_IDC_SET_LEADCHNL_RV_SENSING_ANODE_ELECTRODE_1: NORMAL
MDC_IDC_SET_LEADCHNL_RV_SENSING_ANODE_LOCATION_1: NORMAL
MDC_IDC_SET_LEADCHNL_RV_SENSING_CATHODE_ELECTRODE_1: NORMAL
MDC_IDC_SET_LEADCHNL_RV_SENSING_CATHODE_LOCATION_1: NORMAL
MDC_IDC_SET_LEADCHNL_RV_SENSING_POLARITY: NORMAL
MDC_IDC_SET_LEADCHNL_RV_SENSING_SENSITIVITY: 0.3 MV
MDC_IDC_SET_ZONE_DETECTION_BEATS_DENOMINATOR: 40 {BEATS}
MDC_IDC_SET_ZONE_DETECTION_BEATS_NUMERATOR: 30 {BEATS}
MDC_IDC_SET_ZONE_DETECTION_INTERVAL: 270 MS
MDC_IDC_SET_ZONE_DETECTION_INTERVAL: 460 MS
MDC_IDC_SET_ZONE_DETECTION_INTERVAL: 500 MS
MDC_IDC_SET_ZONE_DETECTION_INTERVAL: NORMAL
MDC_IDC_SET_ZONE_TYPE: NORMAL
MDC_IDC_STAT_AT_BURDEN_PERCENT: 0 %
MDC_IDC_STAT_AT_BURDEN_PERCENT: 0 %
MDC_IDC_STAT_AT_DTM_END: NORMAL
MDC_IDC_STAT_AT_DTM_START: NORMAL
MDC_IDC_STAT_BRADY_DTM_END: NORMAL
MDC_IDC_STAT_BRADY_DTM_START: NORMAL
MDC_IDC_STAT_BRADY_RV_PERCENT_PACED: 92.43 %
MDC_IDC_STAT_BRADY_RV_PERCENT_PACED: 93.54 %
MDC_IDC_STAT_BRADY_RV_PERCENT_PACED: 95.68 %
MDC_IDC_STAT_EPISODE_RECENT_COUNT: 0
MDC_IDC_STAT_EPISODE_RECENT_COUNT_DTM_END: NORMAL
MDC_IDC_STAT_EPISODE_RECENT_COUNT_DTM_START: NORMAL
MDC_IDC_STAT_EPISODE_TOTAL_COUNT: 0
MDC_IDC_STAT_EPISODE_TOTAL_COUNT: 2
MDC_IDC_STAT_EPISODE_TOTAL_COUNT: 233
MDC_IDC_STAT_EPISODE_TOTAL_COUNT_DTM_END: NORMAL
MDC_IDC_STAT_EPISODE_TOTAL_COUNT_DTM_START: NORMAL
MDC_IDC_STAT_EPISODE_TYPE: NORMAL
MDC_IDC_STAT_TACHYTHERAPY_ATP_DELIVERED_RECENT: 0
MDC_IDC_STAT_TACHYTHERAPY_ATP_DELIVERED_TOTAL: 0
MDC_IDC_STAT_TACHYTHERAPY_RECENT_DTM_END: NORMAL
MDC_IDC_STAT_TACHYTHERAPY_RECENT_DTM_START: NORMAL
MDC_IDC_STAT_TACHYTHERAPY_SHOCKS_ABORTED_RECENT: 0
MDC_IDC_STAT_TACHYTHERAPY_SHOCKS_ABORTED_TOTAL: 0
MDC_IDC_STAT_TACHYTHERAPY_SHOCKS_DELIVERED_RECENT: 0
MDC_IDC_STAT_TACHYTHERAPY_SHOCKS_DELIVERED_TOTAL: 0
MDC_IDC_STAT_TACHYTHERAPY_TOTAL_DTM_END: NORMAL
MDC_IDC_STAT_TACHYTHERAPY_TOTAL_DTM_START: NORMAL
MONOCYTES # BLD AUTO: 0.7 10E3/UL (ref 0–1.3)
MONOCYTES # BLD AUTO: 0.8 10E3/UL (ref 0–1.3)
MONOCYTES # BLD AUTO: 0.9 10E3/UL (ref 0–1.3)
MONOCYTES # BLD AUTO: 1 10E3/UL (ref 0–1.3)
MONOCYTES # BLD AUTO: 1.1 10E3/UL (ref 0–1.3)
MONOCYTES # BLD AUTO: 1.2 10E3/UL (ref 0–1.3)
MONOCYTES NFR BLD AUTO: 11 %
MONOCYTES NFR BLD AUTO: 12 %
MONOCYTES NFR BLD AUTO: 13 %
MONOCYTES NFR BLD AUTO: 14 %
MONOCYTES NFR BLD AUTO: 9 %
MRSA DNA SPEC QL NAA+PROBE: NEGATIVE
NEUTROPHILS # BLD AUTO: 4.3 10E3/UL (ref 1.6–8.3)
NEUTROPHILS # BLD AUTO: 4.3 10E3/UL (ref 1.6–8.3)
NEUTROPHILS # BLD AUTO: 4.5 10E3/UL (ref 1.6–8.3)
NEUTROPHILS # BLD AUTO: 4.9 10E3/UL (ref 1.6–8.3)
NEUTROPHILS # BLD AUTO: 5 10E3/UL (ref 1.6–8.3)
NEUTROPHILS # BLD AUTO: 5.1 10E3/UL (ref 1.6–8.3)
NEUTROPHILS # BLD AUTO: 5.2 10E3/UL (ref 1.6–8.3)
NEUTROPHILS # BLD AUTO: 5.3 10E3/UL (ref 1.6–8.3)
NEUTROPHILS # BLD AUTO: 5.3 10E3/UL (ref 1.6–8.3)
NEUTROPHILS # BLD AUTO: 5.5 10E3/UL (ref 1.6–8.3)
NEUTROPHILS # BLD AUTO: 5.6 10E3/UL (ref 1.6–8.3)
NEUTROPHILS # BLD AUTO: 5.7 10E3/UL (ref 1.6–8.3)
NEUTROPHILS # BLD AUTO: 6.2 10E3/UL (ref 1.6–8.3)
NEUTROPHILS # BLD AUTO: 6.5 10E3/UL (ref 1.6–8.3)
NEUTROPHILS # BLD AUTO: 6.6 10E3/UL (ref 1.6–8.3)
NEUTROPHILS # BLD AUTO: 7 10E3/UL (ref 1.6–8.3)
NEUTROPHILS NFR BLD AUTO: 64 %
NEUTROPHILS NFR BLD AUTO: 65 %
NEUTROPHILS NFR BLD AUTO: 65 %
NEUTROPHILS NFR BLD AUTO: 66 %
NEUTROPHILS NFR BLD AUTO: 67 %
NEUTROPHILS NFR BLD AUTO: 68 %
NEUTROPHILS NFR BLD AUTO: 69 %
NEUTROPHILS NFR BLD AUTO: 69 %
NEUTROPHILS NFR BLD AUTO: 71 %
NEUTROPHILS NFR BLD AUTO: 71 %
NEUTROPHILS NFR BLD AUTO: 72 %
NEUTROPHILS NFR BLD AUTO: 73 %
NEUTROPHILS NFR BLD AUTO: 74 %
NEUTROPHILS NFR BLD AUTO: 74 %
NEUTROPHILS NFR BLD AUTO: 75 %
NEUTROPHILS NFR BLD AUTO: 75 %
NONHDLC SERPL-MCNC: 53 MG/DL
NRBC # BLD AUTO: 0 10E3/UL
NRBC BLD AUTO-RTO: 0 /100
NRBC BLD AUTO-RTO: 1 /100
NT-PROBNP SERPL-MCNC: 3432 PG/ML (ref 0–900)
NT-PROBNP SERPL-MCNC: 6219 PG/ML (ref 0–900)
NT-PROBNP SERPL-MCNC: 6484 PG/ML (ref 0–900)
O2/TOTAL GAS SETTING VFR VENT: 0 %
OXYHGB MFR BLDA: 67 % (ref 92–100)
OXYHGB MFR BLDV: 62 % (ref 92–100)
OXYHGB MFR BLDV: 90 % (ref 70–75)
OXYHGB MFR BLDV: 93 % (ref 92–100)
P AXIS - MUSE: NORMAL DEGREES
PCO2 BLDV: 37 MM HG (ref 40–50)
PH BLDV: 7.46 [PH] (ref 7.32–7.43)
PHOSPHATE SERPL-MCNC: 3.2 MG/DL (ref 2.5–4.5)
PHOSPHATE SERPL-MCNC: 3.3 MG/DL (ref 2.5–4.5)
PHOSPHATE SERPL-MCNC: 3.4 MG/DL (ref 2.5–4.5)
PHOSPHATE SERPL-MCNC: 3.6 MG/DL (ref 2.5–4.5)
PHOSPHATE SERPL-MCNC: 3.6 MG/DL (ref 2.5–4.5)
PHOSPHATE SERPL-MCNC: 3.7 MG/DL (ref 2.5–4.5)
PHOSPHATE SERPL-MCNC: 3.8 MG/DL (ref 2.5–4.5)
PHOSPHATE SERPL-MCNC: 3.8 MG/DL (ref 2.5–4.5)
PHOSPHATE SERPL-MCNC: 3.9 MG/DL (ref 2.5–4.5)
PHOSPHATE SERPL-MCNC: 4 MG/DL (ref 2.5–4.5)
PHOSPHATE SERPL-MCNC: 4.1 MG/DL (ref 2.5–4.5)
PHOSPHATE SERPL-MCNC: 4.1 MG/DL (ref 2.5–4.5)
PHOSPHATE SERPL-MCNC: 4.2 MG/DL (ref 2.5–4.5)
PHOSPHATE SERPL-MCNC: 4.4 MG/DL (ref 2.5–4.5)
PLATELET # BLD AUTO: 218 10E3/UL (ref 150–450)
PLATELET # BLD AUTO: 219 10E3/UL (ref 150–450)
PLATELET # BLD AUTO: 221 10E3/UL (ref 150–450)
PLATELET # BLD AUTO: 229 10E3/UL (ref 150–450)
PLATELET # BLD AUTO: 231 10E3/UL (ref 150–450)
PLATELET # BLD AUTO: 232 10E3/UL (ref 150–450)
PLATELET # BLD AUTO: 232 10E3/UL (ref 150–450)
PLATELET # BLD AUTO: 234 10E3/UL (ref 150–450)
PLATELET # BLD AUTO: 236 10E3/UL (ref 150–450)
PLATELET # BLD AUTO: 238 10E3/UL (ref 150–450)
PLATELET # BLD AUTO: 240 10E3/UL (ref 150–450)
PLATELET # BLD AUTO: 243 10E3/UL (ref 150–450)
PLATELET # BLD AUTO: 243 10E3/UL (ref 150–450)
PLATELET # BLD AUTO: 244 10E3/UL (ref 150–450)
PLATELET # BLD AUTO: 244 10E3/UL (ref 150–450)
PLATELET # BLD AUTO: 245 10E3/UL (ref 150–450)
PLATELET # BLD AUTO: 248 10E3/UL (ref 150–450)
PLATELET # BLD AUTO: 251 10E3/UL (ref 150–450)
PLATELET # BLD AUTO: 251 10E3/UL (ref 150–450)
PLATELET # BLD AUTO: 252 10E3/UL (ref 150–450)
PLATELET # BLD AUTO: 263 10E3/UL (ref 150–450)
PLATELET # BLD AUTO: 266 10E3/UL (ref 150–450)
PLATELET # BLD AUTO: 278 10E3/UL (ref 150–450)
PLATELET # BLD AUTO: 280 10E3/UL (ref 150–450)
PLATELET # BLD AUTO: 286 10E3/UL (ref 150–450)
PLATELET # BLD AUTO: 286 10E3/UL (ref 150–450)
PLATELET # BLD AUTO: 293 10E3/UL (ref 150–450)
PO2 BLDV: 60 MM HG (ref 25–47)
POTASSIUM SERPL-SCNC: 2.9 MMOL/L (ref 3.4–5.3)
POTASSIUM SERPL-SCNC: 3 MMOL/L (ref 3.4–5.3)
POTASSIUM SERPL-SCNC: 3.1 MMOL/L (ref 3.4–5.3)
POTASSIUM SERPL-SCNC: 3.2 MMOL/L (ref 3.4–5.3)
POTASSIUM SERPL-SCNC: 3.2 MMOL/L (ref 3.4–5.3)
POTASSIUM SERPL-SCNC: 3.3 MMOL/L (ref 3.4–5.3)
POTASSIUM SERPL-SCNC: 3.3 MMOL/L (ref 3.4–5.3)
POTASSIUM SERPL-SCNC: 3.4 MMOL/L (ref 3.4–5.3)
POTASSIUM SERPL-SCNC: 3.5 MMOL/L (ref 3.4–5.3)
POTASSIUM SERPL-SCNC: 3.6 MMOL/L (ref 3.4–5.3)
POTASSIUM SERPL-SCNC: 3.7 MMOL/L (ref 3.4–5.3)
POTASSIUM SERPL-SCNC: 3.7 MMOL/L (ref 3.4–5.3)
POTASSIUM SERPL-SCNC: 3.8 MMOL/L (ref 3.4–5.3)
POTASSIUM SERPL-SCNC: 3.9 MMOL/L (ref 3.4–5.3)
POTASSIUM SERPL-SCNC: 4 MMOL/L (ref 3.4–5.3)
POTASSIUM SERPL-SCNC: 4.1 MMOL/L (ref 3.4–5.3)
POTASSIUM SERPL-SCNC: 4.1 MMOL/L (ref 3.4–5.3)
POTASSIUM SERPL-SCNC: 4.2 MMOL/L (ref 3.4–5.3)
POTASSIUM SERPL-SCNC: 4.2 MMOL/L (ref 3.4–5.3)
POTASSIUM SERPL-SCNC: 4.3 MMOL/L (ref 3.4–5.3)
POTASSIUM SERPL-SCNC: 4.4 MMOL/L (ref 3.4–5.3)
POTASSIUM SERPL-SCNC: 4.4 MMOL/L (ref 3.4–5.3)
POTASSIUM SERPL-SCNC: 4.5 MMOL/L (ref 3.4–5.3)
POTASSIUM SERPL-SCNC: 4.5 MMOL/L (ref 3.4–5.3)
POTASSIUM SERPL-SCNC: 4.7 MMOL/L (ref 3.4–5.3)
POTASSIUM SERPL-SCNC: 4.8 MMOL/L (ref 3.4–5.3)
POTASSIUM SERPL-SCNC: 4.8 MMOL/L (ref 3.4–5.3)
POTASSIUM SERPL-SCNC: 4.9 MMOL/L (ref 3.4–5.3)
POTASSIUM SERPL-SCNC: 4.9 MMOL/L (ref 3.4–5.3)
POTASSIUM SERPL-SCNC: 5.3 MMOL/L (ref 3.4–5.3)
POTASSIUM SERPL-SCNC: 5.3 MMOL/L (ref 3.4–5.3)
PR INTERVAL - MUSE: NORMAL MS
PROT SERPL-MCNC: 6.2 G/DL (ref 6.4–8.3)
PROT SERPL-MCNC: 6.2 G/DL (ref 6.4–8.3)
PROT SERPL-MCNC: 6.3 G/DL (ref 6.4–8.3)
PROT SERPL-MCNC: 6.3 G/DL (ref 6.4–8.3)
PROT SERPL-MCNC: 6.4 G/DL (ref 6.4–8.3)
PROT SERPL-MCNC: 6.4 G/DL (ref 6.4–8.3)
PROT SERPL-MCNC: 6.8 G/DL (ref 6.4–8.3)
PROT SERPL-MCNC: 6.8 G/DL (ref 6.4–8.3)
PROT SERPL-MCNC: 7 G/DL (ref 6.4–8.3)
PROT SERPL-MCNC: 7.1 G/DL (ref 6.4–8.3)
PROT SERPL-MCNC: 7.1 G/DL (ref 6.4–8.3)
PROT SERPL-MCNC: 7.2 G/DL (ref 6.4–8.3)
PROT SERPL-MCNC: 7.5 G/DL (ref 6.4–8.3)
PROT SERPL-MCNC: 7.6 G/DL (ref 6.4–8.3)
PROT SERPL-MCNC: 7.6 G/DL (ref 6.4–8.3)
QRS DURATION - MUSE: 170 MS
QT - MUSE: 518 MS
QTC - MUSE: 518 MS
R AXIS - MUSE: 270 DEGREES
RBC # BLD AUTO: 3.07 10E6/UL (ref 4.4–5.9)
RBC # BLD AUTO: 3.08 10E6/UL (ref 4.4–5.9)
RBC # BLD AUTO: 3.12 10E6/UL (ref 4.4–5.9)
RBC # BLD AUTO: 3.16 10E6/UL (ref 4.4–5.9)
RBC # BLD AUTO: 3.17 10E6/UL (ref 4.4–5.9)
RBC # BLD AUTO: 3.45 10E6/UL (ref 4.4–5.9)
RBC # BLD AUTO: 4.15 10E6/UL (ref 4.4–5.9)
RBC # BLD AUTO: 4.17 10E6/UL (ref 4.4–5.9)
RBC # BLD AUTO: 4.17 10E6/UL (ref 4.4–5.9)
RBC # BLD AUTO: 4.24 10E6/UL (ref 4.4–5.9)
RBC # BLD AUTO: 4.24 10E6/UL (ref 4.4–5.9)
RBC # BLD AUTO: 4.26 10E6/UL (ref 4.4–5.9)
RBC # BLD AUTO: 4.26 10E6/UL (ref 4.4–5.9)
RBC # BLD AUTO: 4.35 10E6/UL (ref 4.4–5.9)
RBC # BLD AUTO: 4.38 10E6/UL (ref 4.4–5.9)
RBC # BLD AUTO: 4.39 10E6/UL (ref 4.4–5.9)
RBC # BLD AUTO: 4.41 10E6/UL (ref 4.4–5.9)
RBC # BLD AUTO: 4.42 10E6/UL (ref 4.4–5.9)
RBC # BLD AUTO: 4.43 10E6/UL (ref 4.4–5.9)
RBC # BLD AUTO: 4.45 10E6/UL (ref 4.4–5.9)
RBC # BLD AUTO: 4.47 10E6/UL (ref 4.4–5.9)
RBC # BLD AUTO: 4.48 10E6/UL (ref 4.4–5.9)
RBC # BLD AUTO: 4.5 10E6/UL (ref 4.4–5.9)
RBC # BLD AUTO: 4.54 10E6/UL (ref 4.4–5.9)
RBC # BLD AUTO: 4.68 10E6/UL (ref 4.4–5.9)
RBC # BLD AUTO: 4.89 10E6/UL (ref 4.4–5.9)
RBC # BLD AUTO: 4.97 10E6/UL (ref 4.4–5.9)
SA TARGET DNA: NEGATIVE
SARS-COV-2 RNA RESP QL NAA+PROBE: NEGATIVE
SODIUM SERPL-SCNC: 129 MMOL/L (ref 136–145)
SODIUM SERPL-SCNC: 133 MMOL/L (ref 136–145)
SODIUM SERPL-SCNC: 133 MMOL/L (ref 136–145)
SODIUM SERPL-SCNC: 134 MMOL/L (ref 136–145)
SODIUM SERPL-SCNC: 134 MMOL/L (ref 136–145)
SODIUM SERPL-SCNC: 135 MMOL/L (ref 136–145)
SODIUM SERPL-SCNC: 136 MMOL/L (ref 136–145)
SODIUM SERPL-SCNC: 137 MMOL/L (ref 136–145)
SODIUM SERPL-SCNC: 137 MMOL/L (ref 136–145)
SODIUM SERPL-SCNC: 138 MMOL/L (ref 136–145)
SODIUM SERPL-SCNC: 139 MMOL/L (ref 136–145)
SODIUM SERPL-SCNC: 140 MMOL/L (ref 136–145)
SODIUM SERPL-SCNC: 141 MMOL/L (ref 136–145)
SODIUM SERPL-SCNC: 142 MMOL/L (ref 136–145)
SODIUM SERPL-SCNC: 142 MMOL/L (ref 136–145)
SODIUM SERPL-SCNC: 144 MMOL/L (ref 136–145)
SPECIMEN EXPIRATION DATE: NORMAL
SYSTOLIC BLOOD PRESSURE - MUSE: NORMAL MMHG
T AXIS - MUSE: 118 DEGREES
T4 FREE SERPL-MCNC: 1.13 NG/DL (ref 0.9–1.7)
T4 FREE SERPL-MCNC: 1.19 NG/DL (ref 0.9–1.7)
T4 FREE SERPL-MCNC: 1.22 NG/DL (ref 0.9–1.7)
TRIGL SERPL-MCNC: 53 MG/DL
TROPONIN T SERPL HS-MCNC: 97 NG/L
TSH SERPL DL<=0.005 MIU/L-ACNC: 5.16 UIU/ML (ref 0.3–4.2)
TSH SERPL DL<=0.005 MIU/L-ACNC: 6.11 UIU/ML (ref 0.3–4.2)
TSH SERPL DL<=0.005 MIU/L-ACNC: 8.06 UIU/ML (ref 0.3–4.2)
UNIT ABO/RH: NORMAL
UNIT NUMBER: NORMAL
UNIT STATUS: NORMAL
UNIT TYPE ISBT: 6200
VANCOMYCIN SERPL-MCNC: 11.3 UG/ML
VENTRICULAR RATE- MUSE: 60 BPM
WBC # BLD AUTO: 6.4 10E3/UL (ref 4–11)
WBC # BLD AUTO: 6.5 10E3/UL (ref 4–11)
WBC # BLD AUTO: 6.6 10E3/UL (ref 4–11)
WBC # BLD AUTO: 6.6 10E3/UL (ref 4–11)
WBC # BLD AUTO: 6.7 10E3/UL (ref 4–11)
WBC # BLD AUTO: 6.7 10E3/UL (ref 4–11)
WBC # BLD AUTO: 6.9 10E3/UL (ref 4–11)
WBC # BLD AUTO: 7 10E3/UL (ref 4–11)
WBC # BLD AUTO: 7 10E3/UL (ref 4–11)
WBC # BLD AUTO: 7.1 10E3/UL (ref 4–11)
WBC # BLD AUTO: 7.2 10E3/UL (ref 4–11)
WBC # BLD AUTO: 7.5 10E3/UL (ref 4–11)
WBC # BLD AUTO: 7.6 10E3/UL (ref 4–11)
WBC # BLD AUTO: 7.9 10E3/UL (ref 4–11)
WBC # BLD AUTO: 7.9 10E3/UL (ref 4–11)
WBC # BLD AUTO: 8 10E3/UL (ref 4–11)
WBC # BLD AUTO: 8.2 10E3/UL (ref 4–11)
WBC # BLD AUTO: 8.4 10E3/UL (ref 4–11)
WBC # BLD AUTO: 8.6 10E3/UL (ref 4–11)
WBC # BLD AUTO: 8.7 10E3/UL (ref 4–11)
WBC # BLD AUTO: 9.1 10E3/UL (ref 4–11)
WBC # BLD AUTO: 9.9 10E3/UL (ref 4–11)

## 2022-01-01 PROCEDURE — 250N000013 HC RX MED GY IP 250 OP 250 PS 637: Performed by: INTERNAL MEDICINE

## 2022-01-01 PROCEDURE — C1769 GUIDE WIRE: HCPCS | Performed by: INTERNAL MEDICINE

## 2022-01-01 PROCEDURE — 99153 MOD SED SAME PHYS/QHP EA: CPT | Performed by: INTERNAL MEDICINE

## 2022-01-01 PROCEDURE — 99285 EMERGENCY DEPT VISIT HI MDM: CPT | Performed by: EMERGENCY MEDICINE

## 2022-01-01 PROCEDURE — 85610 PROTHROMBIN TIME: CPT | Performed by: SURGERY

## 2022-01-01 PROCEDURE — 83615 LACTATE (LD) (LDH) ENZYME: CPT | Performed by: INTERNAL MEDICINE

## 2022-01-01 PROCEDURE — 250N000009 HC RX 250

## 2022-01-01 PROCEDURE — 83735 ASSAY OF MAGNESIUM: CPT | Performed by: INTERNAL MEDICINE

## 2022-01-01 PROCEDURE — 36415 COLL VENOUS BLD VENIPUNCTURE: CPT | Performed by: SURGERY

## 2022-01-01 PROCEDURE — 250N000013 HC RX MED GY IP 250 OP 250 PS 637: Performed by: STUDENT IN AN ORGANIZED HEALTH CARE EDUCATION/TRAINING PROGRAM

## 2022-01-01 PROCEDURE — 85610 PROTHROMBIN TIME: CPT | Performed by: STUDENT IN AN ORGANIZED HEALTH CARE EDUCATION/TRAINING PROGRAM

## 2022-01-01 PROCEDURE — 36415 COLL VENOUS BLD VENIPUNCTURE: CPT | Performed by: EMERGENCY MEDICINE

## 2022-01-01 PROCEDURE — 36415 COLL VENOUS BLD VENIPUNCTURE: CPT | Performed by: STUDENT IN AN ORGANIZED HEALTH CARE EDUCATION/TRAINING PROGRAM

## 2022-01-01 PROCEDURE — 97535 SELF CARE MNGMENT TRAINING: CPT | Mod: GP | Performed by: PHYSICAL THERAPIST

## 2022-01-01 PROCEDURE — 258N000003 HC RX IP 258 OP 636: Performed by: STUDENT IN AN ORGANIZED HEALTH CARE EDUCATION/TRAINING PROGRAM

## 2022-01-01 PROCEDURE — 86140 C-REACTIVE PROTEIN: CPT | Performed by: EMERGENCY MEDICINE

## 2022-01-01 PROCEDURE — 99607 MTMS BY PHARM ADDL 15 MIN: CPT | Performed by: PHARMACIST

## 2022-01-01 PROCEDURE — 85610 PROTHROMBIN TIME: CPT | Performed by: EMERGENCY MEDICINE

## 2022-01-01 PROCEDURE — G0463 HOSPITAL OUTPT CLINIC VISIT: HCPCS | Mod: PN,RTG | Performed by: INTERNAL MEDICINE

## 2022-01-01 PROCEDURE — 250N000013 HC RX MED GY IP 250 OP 250 PS 637

## 2022-01-01 PROCEDURE — C9803 HOPD COVID-19 SPEC COLLECT: HCPCS | Performed by: EMERGENCY MEDICINE

## 2022-01-01 PROCEDURE — 97140 MANUAL THERAPY 1/> REGIONS: CPT | Mod: GP | Performed by: PHYSICAL THERAPIST

## 2022-01-01 PROCEDURE — 93282 PRGRMG EVAL IMPLANTABLE DFB: CPT

## 2022-01-01 PROCEDURE — 80053 COMPREHEN METABOLIC PANEL: CPT | Performed by: EMERGENCY MEDICINE

## 2022-01-01 PROCEDURE — 250N000009 HC RX 250: Performed by: NURSE PRACTITIONER

## 2022-01-01 PROCEDURE — 999N000142 HC STATISTIC PROCEDURE PREP ONLY

## 2022-01-01 PROCEDURE — 250N000013 HC RX MED GY IP 250 OP 250 PS 637: Performed by: SURGERY

## 2022-01-01 PROCEDURE — 80048 BASIC METABOLIC PNL TOTAL CA: CPT | Performed by: STUDENT IN AN ORGANIZED HEALTH CARE EDUCATION/TRAINING PROGRAM

## 2022-01-01 PROCEDURE — 96375 TX/PRO/DX INJ NEW DRUG ADDON: CPT | Performed by: EMERGENCY MEDICINE

## 2022-01-01 PROCEDURE — 250N000009 HC RX 250: Performed by: STUDENT IN AN ORGANIZED HEALTH CARE EDUCATION/TRAINING PROGRAM

## 2022-01-01 PROCEDURE — 250N000013 HC RX MED GY IP 250 OP 250 PS 637: Performed by: NURSE PRACTITIONER

## 2022-01-01 PROCEDURE — 36415 COLL VENOUS BLD VENIPUNCTURE: CPT | Performed by: NURSE PRACTITIONER

## 2022-01-01 PROCEDURE — 83735 ASSAY OF MAGNESIUM: CPT | Performed by: STUDENT IN AN ORGANIZED HEALTH CARE EDUCATION/TRAINING PROGRAM

## 2022-01-01 PROCEDURE — 84443 ASSAY THYROID STIM HORMONE: CPT | Performed by: STUDENT IN AN ORGANIZED HEALTH CARE EDUCATION/TRAINING PROGRAM

## 2022-01-01 PROCEDURE — 82947 ASSAY GLUCOSE BLOOD QUANT: CPT | Performed by: STUDENT IN AN ORGANIZED HEALTH CARE EDUCATION/TRAINING PROGRAM

## 2022-01-01 PROCEDURE — 93750 INTERROGATION VAD IN PERSON: CPT | Performed by: NURSE PRACTITIONER

## 2022-01-01 PROCEDURE — 36415 COLL VENOUS BLD VENIPUNCTURE: CPT | Performed by: INTERNAL MEDICINE

## 2022-01-01 PROCEDURE — 99239 HOSP IP/OBS DSCHRG MGMT >30: CPT | Mod: 25 | Performed by: INTERNAL MEDICINE

## 2022-01-01 PROCEDURE — 93264 REM MNTR WRLS P-ART PRS SNR: CPT | Performed by: NURSE PRACTITIONER

## 2022-01-01 PROCEDURE — 82040 ASSAY OF SERUM ALBUMIN: CPT | Performed by: SURGERY

## 2022-01-01 PROCEDURE — 71250 CT THORAX DX C-: CPT | Mod: MG

## 2022-01-01 PROCEDURE — 83735 ASSAY OF MAGNESIUM: CPT | Performed by: SURGERY

## 2022-01-01 PROCEDURE — 96366 THER/PROPH/DIAG IV INF ADDON: CPT | Performed by: EMERGENCY MEDICINE

## 2022-01-01 PROCEDURE — 250N000011 HC RX IP 250 OP 636: Performed by: STUDENT IN AN ORGANIZED HEALTH CARE EDUCATION/TRAINING PROGRAM

## 2022-01-01 PROCEDURE — G1010 CDSM STANSON: HCPCS | Mod: GC | Performed by: RADIOLOGY

## 2022-01-01 PROCEDURE — 360N000076 HC SURGERY LEVEL 3, PER MIN: Performed by: THORACIC SURGERY (CARDIOTHORACIC VASCULAR SURGERY)

## 2022-01-01 PROCEDURE — 97530 THERAPEUTIC ACTIVITIES: CPT | Mod: GO

## 2022-01-01 PROCEDURE — 84132 ASSAY OF SERUM POTASSIUM: CPT | Performed by: PHYSICIAN ASSISTANT

## 2022-01-01 PROCEDURE — 85379 FIBRIN DEGRADATION QUANT: CPT | Performed by: INTERNAL MEDICINE

## 2022-01-01 PROCEDURE — 272N000001 HC OR GENERAL SUPPLY STERILE: Performed by: INTERNAL MEDICINE

## 2022-01-01 PROCEDURE — 93306 TTE W/DOPPLER COMPLETE: CPT

## 2022-01-01 PROCEDURE — 85610 PROTHROMBIN TIME: CPT | Performed by: NURSE PRACTITIONER

## 2022-01-01 PROCEDURE — 99605 MTMS BY PHARM NP 15 MIN: CPT | Performed by: PHARMACIST

## 2022-01-01 PROCEDURE — 36415 COLL VENOUS BLD VENIPUNCTURE: CPT | Performed by: PHYSICIAN ASSISTANT

## 2022-01-01 PROCEDURE — 84100 ASSAY OF PHOSPHORUS: CPT | Performed by: STUDENT IN AN ORGANIZED HEALTH CARE EDUCATION/TRAINING PROGRAM

## 2022-01-01 PROCEDURE — 258N000003 HC RX IP 258 OP 636: Performed by: INTERNAL MEDICINE

## 2022-01-01 PROCEDURE — 999N000147 HC STATISTIC PT IP EVAL DEFER

## 2022-01-01 PROCEDURE — 85014 HEMATOCRIT: CPT | Performed by: SURGERY

## 2022-01-01 PROCEDURE — 85027 COMPLETE CBC AUTOMATED: CPT | Performed by: STUDENT IN AN ORGANIZED HEALTH CARE EDUCATION/TRAINING PROGRAM

## 2022-01-01 PROCEDURE — 99213 OFFICE O/P EST LOW 20 MIN: CPT | Mod: 95 | Performed by: INTERNAL MEDICINE

## 2022-01-01 PROCEDURE — 87077 CULTURE AEROBIC IDENTIFY: CPT | Mod: 59 | Performed by: INTERNAL MEDICINE

## 2022-01-01 PROCEDURE — 86140 C-REACTIVE PROTEIN: CPT | Performed by: PHYSICIAN ASSISTANT

## 2022-01-01 PROCEDURE — 97110 THERAPEUTIC EXERCISES: CPT | Mod: GO

## 2022-01-01 PROCEDURE — 250N000009 HC RX 250: Performed by: SURGERY

## 2022-01-01 PROCEDURE — 85004 AUTOMATED DIFF WBC COUNT: CPT | Performed by: SURGERY

## 2022-01-01 PROCEDURE — 82962 GLUCOSE BLOOD TEST: CPT

## 2022-01-01 PROCEDURE — 214N000001 HC R&B CCU UMMC

## 2022-01-01 PROCEDURE — C2624 WIRELESS PRESSURE SENSOR: HCPCS | Performed by: INTERNAL MEDICINE

## 2022-01-01 PROCEDURE — 85018 HEMOGLOBIN: CPT | Performed by: NURSE PRACTITIONER

## 2022-01-01 PROCEDURE — 80053 COMPREHEN METABOLIC PANEL: CPT | Performed by: SURGERY

## 2022-01-01 PROCEDURE — 93750 INTERROGATION VAD IN PERSON: CPT | Performed by: INTERNAL MEDICINE

## 2022-01-01 PROCEDURE — 97165 OT EVAL LOW COMPLEX 30 MIN: CPT | Mod: GO

## 2022-01-01 PROCEDURE — 99233 SBSQ HOSP IP/OBS HIGH 50: CPT | Mod: 25 | Performed by: NURSE PRACTITIONER

## 2022-01-01 PROCEDURE — 85004 AUTOMATED DIFF WBC COUNT: CPT | Performed by: EMERGENCY MEDICINE

## 2022-01-01 PROCEDURE — 83615 LACTATE (LD) (LDH) ENZYME: CPT | Performed by: STUDENT IN AN ORGANIZED HEALTH CARE EDUCATION/TRAINING PROGRAM

## 2022-01-01 PROCEDURE — P9059 PLASMA, FRZ BETWEEN 8-24HOUR: HCPCS | Performed by: PHYSICIAN ASSISTANT

## 2022-01-01 PROCEDURE — 93975 VASCULAR STUDY: CPT | Mod: 26 | Performed by: RADIOLOGY

## 2022-01-01 PROCEDURE — 250N000009 HC RX 250: Performed by: EMERGENCY MEDICINE

## 2022-01-01 PROCEDURE — 250N000011 HC RX IP 250 OP 636: Performed by: NURSE PRACTITIONER

## 2022-01-01 PROCEDURE — 87641 MR-STAPH DNA AMP PROBE: CPT | Performed by: STUDENT IN AN ORGANIZED HEALTH CARE EDUCATION/TRAINING PROGRAM

## 2022-01-01 PROCEDURE — 99233 SBSQ HOSP IP/OBS HIGH 50: CPT | Mod: 25 | Performed by: INTERNAL MEDICINE

## 2022-01-01 PROCEDURE — 80061 LIPID PANEL: CPT

## 2022-01-01 PROCEDURE — 85025 COMPLETE CBC W/AUTO DIFF WBC: CPT | Performed by: SURGERY

## 2022-01-01 PROCEDURE — 97110 THERAPEUTIC EXERCISES: CPT | Mod: GO | Performed by: OCCUPATIONAL THERAPIST

## 2022-01-01 PROCEDURE — 82310 ASSAY OF CALCIUM: CPT | Performed by: STUDENT IN AN ORGANIZED HEALTH CARE EDUCATION/TRAINING PROGRAM

## 2022-01-01 PROCEDURE — 93282 PRGRMG EVAL IMPLANTABLE DFB: CPT | Mod: 26 | Performed by: INTERNAL MEDICINE

## 2022-01-01 PROCEDURE — 250N000009 HC RX 250: Performed by: INTERNAL MEDICINE

## 2022-01-01 PROCEDURE — 99232 SBSQ HOSP IP/OBS MODERATE 35: CPT | Mod: 25 | Performed by: NURSE PRACTITIONER

## 2022-01-01 PROCEDURE — 36592 COLLECT BLOOD FROM PICC: CPT | Performed by: NURSE PRACTITIONER

## 2022-01-01 PROCEDURE — 250N000011 HC RX IP 250 OP 636

## 2022-01-01 PROCEDURE — 99285 EMERGENCY DEPT VISIT HI MDM: CPT | Mod: 25 | Performed by: EMERGENCY MEDICINE

## 2022-01-01 PROCEDURE — 83880 ASSAY OF NATRIURETIC PEPTIDE: CPT | Performed by: EMERGENCY MEDICINE

## 2022-01-01 PROCEDURE — 82374 ASSAY BLOOD CARBON DIOXIDE: CPT | Performed by: STUDENT IN AN ORGANIZED HEALTH CARE EDUCATION/TRAINING PROGRAM

## 2022-01-01 PROCEDURE — 85025 COMPLETE CBC W/AUTO DIFF WBC: CPT | Performed by: STUDENT IN AN ORGANIZED HEALTH CARE EDUCATION/TRAINING PROGRAM

## 2022-01-01 PROCEDURE — 272N000019 HC KIT OPEN ENDED DOUBLE LUMEN

## 2022-01-01 PROCEDURE — 99222 1ST HOSP IP/OBS MODERATE 55: CPT | Performed by: PHYSICIAN ASSISTANT

## 2022-01-01 PROCEDURE — 82435 ASSAY OF BLOOD CHLORIDE: CPT | Performed by: STUDENT IN AN ORGANIZED HEALTH CARE EDUCATION/TRAINING PROGRAM

## 2022-01-01 PROCEDURE — C1887 CATHETER, GUIDING: HCPCS | Performed by: INTERNAL MEDICINE

## 2022-01-01 PROCEDURE — 80053 COMPREHEN METABOLIC PANEL: CPT | Performed by: STUDENT IN AN ORGANIZED HEALTH CARE EDUCATION/TRAINING PROGRAM

## 2022-01-01 PROCEDURE — 710N000010 HC RECOVERY PHASE 1, LEVEL 2, PER MIN: Performed by: THORACIC SURGERY (CARDIOTHORACIC VASCULAR SURGERY)

## 2022-01-01 PROCEDURE — 85018 HEMOGLOBIN: CPT | Performed by: PHYSICIAN ASSISTANT

## 2022-01-01 PROCEDURE — 84132 ASSAY OF SERUM POTASSIUM: CPT | Performed by: STUDENT IN AN ORGANIZED HEALTH CARE EDUCATION/TRAINING PROGRAM

## 2022-01-01 PROCEDURE — 85610 PROTHROMBIN TIME: CPT | Performed by: PATHOLOGY

## 2022-01-01 PROCEDURE — 84439 ASSAY OF FREE THYROXINE: CPT | Performed by: STUDENT IN AN ORGANIZED HEALTH CARE EDUCATION/TRAINING PROGRAM

## 2022-01-01 PROCEDURE — 36592 COLLECT BLOOD FROM PICC: CPT | Performed by: SURGERY

## 2022-01-01 PROCEDURE — 86901 BLOOD TYPING SEROLOGIC RH(D): CPT | Performed by: PHYSICIAN ASSISTANT

## 2022-01-01 PROCEDURE — 93750 INTERROGATION VAD IN PERSON: CPT | Performed by: PHYSICIAN ASSISTANT

## 2022-01-01 PROCEDURE — 250N000011 HC RX IP 250 OP 636: Performed by: EMERGENCY MEDICINE

## 2022-01-01 PROCEDURE — 80053 COMPREHEN METABOLIC PANEL: CPT | Performed by: PHYSICIAN ASSISTANT

## 2022-01-01 PROCEDURE — 93321 DOPPLER ECHO F-UP/LMTD STD: CPT | Mod: 26 | Performed by: STUDENT IN AN ORGANIZED HEALTH CARE EDUCATION/TRAINING PROGRAM

## 2022-01-01 PROCEDURE — 80053 COMPREHEN METABOLIC PANEL: CPT | Performed by: INTERNAL MEDICINE

## 2022-01-01 PROCEDURE — 80048 BASIC METABOLIC PNL TOTAL CA: CPT

## 2022-01-01 PROCEDURE — 80053 COMPREHEN METABOLIC PANEL: CPT | Performed by: PATHOLOGY

## 2022-01-01 PROCEDURE — 99233 SBSQ HOSP IP/OBS HIGH 50: CPT | Mod: 24 | Performed by: STUDENT IN AN ORGANIZED HEALTH CARE EDUCATION/TRAINING PROGRAM

## 2022-01-01 PROCEDURE — 999N000141 HC STATISTIC PRE-PROCEDURE NURSING ASSESSMENT: Performed by: THORACIC SURGERY (CARDIOTHORACIC VASCULAR SURGERY)

## 2022-01-01 PROCEDURE — 93306 TTE W/DOPPLER COMPLETE: CPT | Mod: 26 | Performed by: INTERNAL MEDICINE

## 2022-01-01 PROCEDURE — 250N000012 HC RX MED GY IP 250 OP 636 PS 637: Performed by: SURGERY

## 2022-01-01 PROCEDURE — 85025 COMPLETE CBC W/AUTO DIFF WBC: CPT

## 2022-01-01 PROCEDURE — 85014 HEMATOCRIT: CPT | Performed by: STUDENT IN AN ORGANIZED HEALTH CARE EDUCATION/TRAINING PROGRAM

## 2022-01-01 PROCEDURE — 86850 RBC ANTIBODY SCREEN: CPT | Performed by: PHYSICIAN ASSISTANT

## 2022-01-01 PROCEDURE — 87205 SMEAR GRAM STAIN: CPT | Performed by: INTERNAL MEDICINE

## 2022-01-01 PROCEDURE — 99233 SBSQ HOSP IP/OBS HIGH 50: CPT | Mod: GC | Performed by: INTERNAL MEDICINE

## 2022-01-01 PROCEDURE — 85014 HEMATOCRIT: CPT

## 2022-01-01 PROCEDURE — G0463 HOSPITAL OUTPT CLINIC VISIT: HCPCS | Mod: 25

## 2022-01-01 PROCEDURE — 85610 PROTHROMBIN TIME: CPT

## 2022-01-01 PROCEDURE — 85025 COMPLETE CBC W/AUTO DIFF WBC: CPT | Performed by: INTERNAL MEDICINE

## 2022-01-01 PROCEDURE — 250N000009 HC RX 250: Performed by: PHYSICIAN ASSISTANT

## 2022-01-01 PROCEDURE — 82310 ASSAY OF CALCIUM: CPT | Performed by: EMERGENCY MEDICINE

## 2022-01-01 PROCEDURE — 10180 I&D COMPLEX PO WOUND INFCTJ: CPT | Performed by: THORACIC SURGERY (CARDIOTHORACIC VASCULAR SURGERY)

## 2022-01-01 PROCEDURE — 250N000011 HC RX IP 250 OP 636: Performed by: INTERNAL MEDICINE

## 2022-01-01 PROCEDURE — 82550 ASSAY OF CK (CPK): CPT | Performed by: INTERNAL MEDICINE

## 2022-01-01 PROCEDURE — 97535 SELF CARE MNGMENT TRAINING: CPT | Mod: GO

## 2022-01-01 PROCEDURE — 99152 MOD SED SAME PHYS/QHP 5/>YRS: CPT | Mod: GC | Performed by: INTERNAL MEDICINE

## 2022-01-01 PROCEDURE — 36415 COLL VENOUS BLD VENIPUNCTURE: CPT | Performed by: PATHOLOGY

## 2022-01-01 PROCEDURE — 999N000134 HC STATISTIC PP CARE STAGE 3

## 2022-01-01 PROCEDURE — 93451 RIGHT HEART CATH: CPT | Performed by: INTERNAL MEDICINE

## 2022-01-01 PROCEDURE — 86901 BLOOD TYPING SEROLOGIC RH(D): CPT | Performed by: INTERNAL MEDICINE

## 2022-01-01 PROCEDURE — 82810 BLOOD GASES O2 SAT ONLY: CPT

## 2022-01-01 PROCEDURE — 99223 1ST HOSP IP/OBS HIGH 75: CPT | Performed by: STUDENT IN AN ORGANIZED HEALTH CARE EDUCATION/TRAINING PROGRAM

## 2022-01-01 PROCEDURE — 96365 THER/PROPH/DIAG IV INF INIT: CPT | Performed by: EMERGENCY MEDICINE

## 2022-01-01 PROCEDURE — 83036 HEMOGLOBIN GLYCOSYLATED A1C: CPT | Performed by: STUDENT IN AN ORGANIZED HEALTH CARE EDUCATION/TRAINING PROGRAM

## 2022-01-01 PROCEDURE — 83735 ASSAY OF MAGNESIUM: CPT | Performed by: PHYSICIAN ASSISTANT

## 2022-01-01 PROCEDURE — 33289 TCAT IMPL WRLS P-ART PRS SNR: CPT | Mod: GC | Performed by: INTERNAL MEDICINE

## 2022-01-01 PROCEDURE — 250N000011 HC RX IP 250 OP 636: Performed by: THORACIC SURGERY (CARDIOTHORACIC VASCULAR SURGERY)

## 2022-01-01 PROCEDURE — 84132 ASSAY OF SERUM POTASSIUM: CPT | Performed by: INTERNAL MEDICINE

## 2022-01-01 PROCEDURE — 93010 ELECTROCARDIOGRAM REPORT: CPT | Performed by: EMERGENCY MEDICINE

## 2022-01-01 PROCEDURE — 99223 1ST HOSP IP/OBS HIGH 75: CPT | Mod: 25 | Performed by: INTERNAL MEDICINE

## 2022-01-01 PROCEDURE — 71045 X-RAY EXAM CHEST 1 VIEW: CPT | Mod: 26 | Performed by: RADIOLOGY

## 2022-01-01 PROCEDURE — U0005 INFEC AGEN DETEC AMPLI PROBE: HCPCS | Performed by: STUDENT IN AN ORGANIZED HEALTH CARE EDUCATION/TRAINING PROGRAM

## 2022-01-01 PROCEDURE — 99239 HOSP IP/OBS DSCHRG MGMT >30: CPT | Mod: 24 | Performed by: NURSE PRACTITIONER

## 2022-01-01 PROCEDURE — 84450 TRANSFERASE (AST) (SGOT): CPT | Performed by: STUDENT IN AN ORGANIZED HEALTH CARE EDUCATION/TRAINING PROGRAM

## 2022-01-01 PROCEDURE — 85027 COMPLETE CBC AUTOMATED: CPT | Performed by: PATHOLOGY

## 2022-01-01 PROCEDURE — 36415 COLL VENOUS BLD VENIPUNCTURE: CPT

## 2022-01-01 PROCEDURE — 71250 CT THORAX DX C-: CPT | Mod: 26 | Performed by: RADIOLOGY

## 2022-01-01 PROCEDURE — 80202 ASSAY OF VANCOMYCIN: CPT | Performed by: INTERNAL MEDICINE

## 2022-01-01 PROCEDURE — 87205 SMEAR GRAM STAIN: CPT | Performed by: THORACIC SURGERY (CARDIOTHORACIC VASCULAR SURGERY)

## 2022-01-01 PROCEDURE — 93325 DOPPLER ECHO COLOR FLOW MAPG: CPT | Mod: 26 | Performed by: STUDENT IN AN ORGANIZED HEALTH CARE EDUCATION/TRAINING PROGRAM

## 2022-01-01 PROCEDURE — 272N000451 HC KIT SHRLOCK 5FR POWER PICC DOUBLE LUMEN

## 2022-01-01 PROCEDURE — 33289 TCAT IMPL WRLS P-ART PRS SNR: CPT | Performed by: INTERNAL MEDICINE

## 2022-01-01 PROCEDURE — 74176 CT ABD & PELVIS W/O CONTRAST: CPT | Mod: 26 | Performed by: RADIOLOGY

## 2022-01-01 PROCEDURE — 258N000003 HC RX IP 258 OP 636

## 2022-01-01 PROCEDURE — 272N000001 HC OR GENERAL SUPPLY STERILE: Performed by: THORACIC SURGERY (CARDIOTHORACIC VASCULAR SURGERY)

## 2022-01-01 PROCEDURE — 85610 PROTHROMBIN TIME: CPT | Performed by: INTERNAL MEDICINE

## 2022-01-01 PROCEDURE — 87116 MYCOBACTERIA CULTURE: CPT | Performed by: THORACIC SURGERY (CARDIOTHORACIC VASCULAR SURGERY)

## 2022-01-01 PROCEDURE — 84443 ASSAY THYROID STIM HORMONE: CPT

## 2022-01-01 PROCEDURE — 87102 FUNGUS ISOLATION CULTURE: CPT | Performed by: THORACIC SURGERY (CARDIOTHORACIC VASCULAR SURGERY)

## 2022-01-01 PROCEDURE — 93282 PRGRMG EVAL IMPLANTABLE DFB: CPT | Performed by: INTERNAL MEDICINE

## 2022-01-01 PROCEDURE — 86850 RBC ANTIBODY SCREEN: CPT | Performed by: INTERNAL MEDICINE

## 2022-01-01 PROCEDURE — 97161 PT EVAL LOW COMPLEX 20 MIN: CPT | Mod: GP | Performed by: PHYSICAL THERAPIST

## 2022-01-01 PROCEDURE — 85018 HEMOGLOBIN: CPT

## 2022-01-01 PROCEDURE — 82805 BLOOD GASES W/O2 SATURATION: CPT

## 2022-01-01 PROCEDURE — U0003 INFECTIOUS AGENT DETECTION BY NUCLEIC ACID (DNA OR RNA); SEVERE ACUTE RESPIRATORY SYNDROME CORONAVIRUS 2 (SARS-COV-2) (CORONAVIRUS DISEASE [COVID-19]), AMPLIFIED PROBE TECHNIQUE, MAKING USE OF HIGH THROUGHPUT TECHNOLOGIES AS DESCRIBED BY CMS-2020-01-R: HCPCS | Performed by: STUDENT IN AN ORGANIZED HEALTH CARE EDUCATION/TRAINING PROGRAM

## 2022-01-01 PROCEDURE — 82040 ASSAY OF SERUM ALBUMIN: CPT | Performed by: PHYSICIAN ASSISTANT

## 2022-01-01 PROCEDURE — 93750 INTERROGATION VAD IN PERSON: CPT | Performed by: STUDENT IN AN ORGANIZED HEALTH CARE EDUCATION/TRAINING PROGRAM

## 2022-01-01 PROCEDURE — 99207 PR SC NO CHARGE VISIT/PATIENT NOT SEEN: CPT | Performed by: INTERNAL MEDICINE

## 2022-01-01 PROCEDURE — 83735 ASSAY OF MAGNESIUM: CPT | Performed by: EMERGENCY MEDICINE

## 2022-01-01 PROCEDURE — 93308 TTE F-UP OR LMTD: CPT | Mod: 26 | Performed by: STUDENT IN AN ORGANIZED HEALTH CARE EDUCATION/TRAINING PROGRAM

## 2022-01-01 PROCEDURE — 99215 OFFICE O/P EST HI 40 MIN: CPT | Mod: 25 | Performed by: INTERNAL MEDICINE

## 2022-01-01 PROCEDURE — 250N000025 HC SEVOFLURANE, PER MIN: Performed by: THORACIC SURGERY (CARDIOTHORACIC VASCULAR SURGERY)

## 2022-01-01 PROCEDURE — 370N000017 HC ANESTHESIA TECHNICAL FEE, PER MIN: Performed by: THORACIC SURGERY (CARDIOTHORACIC VASCULAR SURGERY)

## 2022-01-01 PROCEDURE — 97535 SELF CARE MNGMENT TRAINING: CPT | Mod: GO | Performed by: OCCUPATIONAL THERAPIST

## 2022-01-01 PROCEDURE — 87040 BLOOD CULTURE FOR BACTERIA: CPT | Performed by: EMERGENCY MEDICINE

## 2022-01-01 PROCEDURE — 99233 SBSQ HOSP IP/OBS HIGH 50: CPT | Mod: 25 | Performed by: PHYSICIAN ASSISTANT

## 2022-01-01 PROCEDURE — 83615 LACTATE (LD) (LDH) ENZYME: CPT | Performed by: NURSE PRACTITIONER

## 2022-01-01 PROCEDURE — 99215 OFFICE O/P EST HI 40 MIN: CPT | Mod: 95 | Performed by: INTERNAL MEDICINE

## 2022-01-01 PROCEDURE — 4A023N6 MEASUREMENT OF CARDIAC SAMPLING AND PRESSURE, RIGHT HEART, PERCUTANEOUS APPROACH: ICD-10-PCS | Performed by: INTERNAL MEDICINE

## 2022-01-01 PROCEDURE — 250N000009 HC RX 250: Performed by: THORACIC SURGERY (CARDIOTHORACIC VASCULAR SURGERY)

## 2022-01-01 PROCEDURE — 85652 RBC SED RATE AUTOMATED: CPT | Performed by: EMERGENCY MEDICINE

## 2022-01-01 PROCEDURE — 87077 CULTURE AEROBIC IDENTIFY: CPT | Performed by: INTERNAL MEDICINE

## 2022-01-01 PROCEDURE — U0005 INFEC AGEN DETEC AMPLI PROBE: HCPCS | Performed by: EMERGENCY MEDICINE

## 2022-01-01 PROCEDURE — 99214 OFFICE O/P EST MOD 30 MIN: CPT | Performed by: PHYSICIAN ASSISTANT

## 2022-01-01 PROCEDURE — 83880 ASSAY OF NATRIURETIC PEPTIDE: CPT | Performed by: PHYSICIAN ASSISTANT

## 2022-01-01 PROCEDURE — 93306 TTE W/DOPPLER COMPLETE: CPT | Mod: 26 | Performed by: STUDENT IN AN ORGANIZED HEALTH CARE EDUCATION/TRAINING PROGRAM

## 2022-01-01 PROCEDURE — 93451 RIGHT HEART CATH: CPT | Mod: 26 | Performed by: INTERNAL MEDICINE

## 2022-01-01 PROCEDURE — 84484 ASSAY OF TROPONIN QUANT: CPT | Performed by: EMERGENCY MEDICINE

## 2022-01-01 PROCEDURE — C1760 CLOSURE DEV, VASC: HCPCS | Performed by: INTERNAL MEDICINE

## 2022-01-01 PROCEDURE — 999N000128 HC STATISTIC PERIPHERAL IV START W/O US GUIDANCE

## 2022-01-01 PROCEDURE — 83880 ASSAY OF NATRIURETIC PEPTIDE: CPT | Performed by: STUDENT IN AN ORGANIZED HEALTH CARE EDUCATION/TRAINING PROGRAM

## 2022-01-01 PROCEDURE — 71045 X-RAY EXAM CHEST 1 VIEW: CPT

## 2022-01-01 PROCEDURE — 99215 OFFICE O/P EST HI 40 MIN: CPT | Performed by: INTERNAL MEDICINE

## 2022-01-01 PROCEDURE — 87075 CULTR BACTERIA EXCEPT BLOOD: CPT | Performed by: THORACIC SURGERY (CARDIOTHORACIC VASCULAR SURGERY)

## 2022-01-01 PROCEDURE — 93321 DOPPLER ECHO F-UP/LMTD STD: CPT

## 2022-01-01 PROCEDURE — 82610 CYSTATIN C: CPT | Performed by: STUDENT IN AN ORGANIZED HEALTH CARE EDUCATION/TRAINING PROGRAM

## 2022-01-01 PROCEDURE — 87075 CULTR BACTERIA EXCEPT BLOOD: CPT | Performed by: INTERNAL MEDICINE

## 2022-01-01 PROCEDURE — 87205 SMEAR GRAM STAIN: CPT | Performed by: EMERGENCY MEDICINE

## 2022-01-01 PROCEDURE — 85018 HEMOGLOBIN: CPT | Mod: 91

## 2022-01-01 PROCEDURE — 36569 INSJ PICC 5 YR+ W/O IMAGING: CPT

## 2022-01-01 PROCEDURE — C1894 INTRO/SHEATH, NON-LASER: HCPCS | Performed by: INTERNAL MEDICINE

## 2022-01-01 PROCEDURE — 87077 CULTURE AEROBIC IDENTIFY: CPT | Performed by: THORACIC SURGERY (CARDIOTHORACIC VASCULAR SURGERY)

## 2022-01-01 PROCEDURE — 999N000147 HC STATISTIC PT IP EVAL DEFER: Performed by: PHYSICAL THERAPIST

## 2022-01-01 PROCEDURE — 93975 VASCULAR STUDY: CPT

## 2022-01-01 PROCEDURE — 85379 FIBRIN DEGRADATION QUANT: CPT | Performed by: PHYSICIAN ASSISTANT

## 2022-01-01 PROCEDURE — 99233 SBSQ HOSP IP/OBS HIGH 50: CPT | Mod: 24 | Performed by: PHYSICIAN ASSISTANT

## 2022-01-01 PROCEDURE — 87077 CULTURE AEROBIC IDENTIFY: CPT | Performed by: EMERGENCY MEDICINE

## 2022-01-01 PROCEDURE — 0H97XZZ DRAINAGE OF ABDOMEN SKIN, EXTERNAL APPROACH: ICD-10-PCS | Performed by: THORACIC SURGERY (CARDIOTHORACIC VASCULAR SURGERY)

## 2022-01-01 PROCEDURE — 85004 AUTOMATED DIFF WBC COUNT: CPT | Performed by: STUDENT IN AN ORGANIZED HEALTH CARE EDUCATION/TRAINING PROGRAM

## 2022-01-01 PROCEDURE — 99152 MOD SED SAME PHYS/QHP 5/>YRS: CPT | Performed by: INTERNAL MEDICINE

## 2022-01-01 PROCEDURE — 93005 ELECTROCARDIOGRAM TRACING: CPT | Performed by: EMERGENCY MEDICINE

## 2022-01-01 PROCEDURE — 84439 ASSAY OF FREE THYROXINE: CPT

## 2022-01-01 PROCEDURE — 93325 DOPPLER ECHO COLOR FLOW MAPG: CPT

## 2022-01-01 PROCEDURE — 82810 BLOOD GASES O2 SAT ONLY: CPT | Mod: 91

## 2022-01-01 PROCEDURE — 99214 OFFICE O/P EST MOD 30 MIN: CPT | Performed by: INTERNAL MEDICINE

## 2022-01-01 DEVICE — SYSTEM CARDIOMEMS PA SENSOR AND DELIVERY: Type: IMPLANTABLE DEVICE | Status: FUNCTIONAL

## 2022-01-01 RX ORDER — ONDANSETRON 4 MG/1
4 TABLET, ORALLY DISINTEGRATING ORAL EVERY 6 HOURS PRN
Status: DISCONTINUED | OUTPATIENT
Start: 2022-01-01 | End: 2022-01-01 | Stop reason: HOSPADM

## 2022-01-01 RX ORDER — POLYETHYLENE GLYCOL 3350 17 G/17G
17 POWDER, FOR SOLUTION ORAL DAILY PRN
Status: DISCONTINUED | OUTPATIENT
Start: 2022-01-01 | End: 2022-01-01 | Stop reason: HOSPADM

## 2022-01-01 RX ORDER — ATORVASTATIN CALCIUM 20 MG/1
20 TABLET, FILM COATED ORAL DAILY
Status: DISCONTINUED | OUTPATIENT
Start: 2022-01-01 | End: 2022-01-01 | Stop reason: HOSPADM

## 2022-01-01 RX ORDER — DAPTOMYCIN 50 MG/ML
500 INJECTION, POWDER, LYOPHILIZED, FOR SOLUTION INTRAVENOUS EVERY 24 HOURS
Qty: 180 ML | Refills: 0 | Status: SHIPPED | OUTPATIENT
Start: 2022-01-01 | End: 2023-01-01

## 2022-01-01 RX ORDER — AMOXICILLIN 250 MG
1 CAPSULE ORAL 2 TIMES DAILY PRN
Status: DISCONTINUED | OUTPATIENT
Start: 2022-01-01 | End: 2022-01-01

## 2022-01-01 RX ORDER — HYDROMORPHONE HCL IN WATER/PF 6 MG/30 ML
0.2 PATIENT CONTROLLED ANALGESIA SYRINGE INTRAVENOUS EVERY 5 MIN PRN
Status: DISCONTINUED | OUTPATIENT
Start: 2022-01-01 | End: 2022-01-01 | Stop reason: HOSPADM

## 2022-01-01 RX ORDER — BUPIVACAINE HYDROCHLORIDE AND EPINEPHRINE 2.5; 5 MG/ML; UG/ML
INJECTION, SOLUTION INFILTRATION; PERINEURAL PRN
Status: DISCONTINUED | OUTPATIENT
Start: 2022-01-01 | End: 2022-01-01 | Stop reason: HOSPADM

## 2022-01-01 RX ORDER — SULFAMETHOXAZOLE AND TRIMETHOPRIM 400; 80 MG/1; MG/1
1 TABLET ORAL DAILY
Qty: 30 TABLET | Refills: 3 | Status: ON HOLD | OUTPATIENT
Start: 2022-01-01 | End: 2022-01-01

## 2022-01-01 RX ORDER — SODIUM CHLORIDE 9 MG/ML
INJECTION, SOLUTION INTRAVENOUS CONTINUOUS
Status: DISCONTINUED | OUTPATIENT
Start: 2022-01-01 | End: 2022-01-01 | Stop reason: HOSPADM

## 2022-01-01 RX ORDER — POTASSIUM CHLORIDE 1500 MG/1
TABLET, EXTENDED RELEASE ORAL
Qty: 30 TABLET | Refills: 0 | Status: SHIPPED | OUTPATIENT
Start: 2022-01-01 | End: 2022-01-01

## 2022-01-01 RX ORDER — POTASSIUM CHLORIDE 20MEQ/15ML
40 LIQUID (ML) ORAL ONCE
Status: COMPLETED | OUTPATIENT
Start: 2022-01-01 | End: 2022-01-01

## 2022-01-01 RX ORDER — ONDANSETRON 4 MG/1
4 TABLET, ORALLY DISINTEGRATING ORAL EVERY 30 MIN PRN
Status: DISCONTINUED | OUTPATIENT
Start: 2022-01-01 | End: 2022-01-01 | Stop reason: HOSPADM

## 2022-01-01 RX ORDER — CHLOROTHIAZIDE SODIUM 500 MG/1
1000 INJECTION INTRAVENOUS ONCE
Status: COMPLETED | OUTPATIENT
Start: 2022-01-01 | End: 2022-01-01

## 2022-01-01 RX ORDER — PIPERACILLIN SODIUM, TAZOBACTAM SODIUM 4; .5 G/20ML; G/20ML
4.5 INJECTION, POWDER, LYOPHILIZED, FOR SOLUTION INTRAVENOUS EVERY 6 HOURS
Status: DISCONTINUED | OUTPATIENT
Start: 2022-01-01 | End: 2022-01-01

## 2022-01-01 RX ORDER — LEVOTHYROXINE SODIUM 50 UG/1
50 TABLET ORAL DAILY
Status: ON HOLD | COMMUNITY
End: 2022-01-01

## 2022-01-01 RX ORDER — TAMSULOSIN HYDROCHLORIDE 0.4 MG/1
0.8 CAPSULE ORAL DAILY
Status: DISCONTINUED | OUTPATIENT
Start: 2022-01-01 | End: 2022-01-01 | Stop reason: HOSPADM

## 2022-01-01 RX ORDER — OXYCODONE HYDROCHLORIDE 5 MG/1
5 TABLET ORAL EVERY 4 HOURS PRN
Status: DISCONTINUED | OUTPATIENT
Start: 2022-01-01 | End: 2022-01-01 | Stop reason: HOSPADM

## 2022-01-01 RX ORDER — MAGNESIUM OXIDE 400 MG/1
400 TABLET ORAL EVERY 4 HOURS
Status: COMPLETED | OUTPATIENT
Start: 2022-01-01 | End: 2022-01-01

## 2022-01-01 RX ORDER — ALLOPURINOL 100 MG/1
200 TABLET ORAL DAILY
Status: DISCONTINUED | OUTPATIENT
Start: 2022-01-01 | End: 2022-01-01 | Stop reason: HOSPADM

## 2022-01-01 RX ORDER — WARFARIN SODIUM 3 MG/1
3 TABLET ORAL
Status: COMPLETED | OUTPATIENT
Start: 2022-01-01 | End: 2022-01-01

## 2022-01-01 RX ORDER — WARFARIN SODIUM 4 MG/1
4 TABLET ORAL
Status: COMPLETED | OUTPATIENT
Start: 2022-01-01 | End: 2022-01-01

## 2022-01-01 RX ORDER — POTASSIUM CHLORIDE 750 MG/1
20 TABLET, EXTENDED RELEASE ORAL ONCE
Status: COMPLETED | OUTPATIENT
Start: 2022-01-01 | End: 2022-01-01

## 2022-01-01 RX ORDER — LEVOTHYROXINE SODIUM 75 UG/1
75 TABLET ORAL
Status: DISCONTINUED | OUTPATIENT
Start: 2022-01-01 | End: 2022-01-01 | Stop reason: HOSPADM

## 2022-01-01 RX ORDER — WARFARIN SODIUM 5 MG/1
5 TABLET ORAL
Status: DISCONTINUED | OUTPATIENT
Start: 2022-01-01 | End: 2022-01-01 | Stop reason: HOSPADM

## 2022-01-01 RX ORDER — WARFARIN SODIUM 5 MG/1
5 TABLET ORAL
Status: COMPLETED | OUTPATIENT
Start: 2022-01-01 | End: 2022-01-01

## 2022-01-01 RX ORDER — DEXTROSE MONOHYDRATE 25 G/50ML
25-50 INJECTION, SOLUTION INTRAVENOUS
Status: DISCONTINUED | OUTPATIENT
Start: 2022-01-01 | End: 2022-01-01 | Stop reason: HOSPADM

## 2022-01-01 RX ORDER — NICOTINE POLACRILEX 4 MG
15-30 LOZENGE BUCCAL
Status: DISCONTINUED | OUTPATIENT
Start: 2022-01-01 | End: 2022-01-01 | Stop reason: HOSPADM

## 2022-01-01 RX ORDER — SACUBITRIL AND VALSARTAN 24; 26 MG/1; MG/1
0.5 TABLET, FILM COATED ORAL 2 TIMES DAILY
Qty: 60 TABLET | Refills: 1 | Status: ON HOLD | OUTPATIENT
Start: 2022-01-01 | End: 2022-01-01

## 2022-01-01 RX ORDER — LEVOTHYROXINE SODIUM 50 UG/1
50 TABLET ORAL
Status: DISCONTINUED | OUTPATIENT
Start: 2022-01-01 | End: 2022-01-01

## 2022-01-01 RX ORDER — POTASSIUM CHLORIDE 1500 MG/1
40 TABLET, EXTENDED RELEASE ORAL DAILY
Status: DISCONTINUED | OUTPATIENT
Start: 2022-01-01 | End: 2022-01-01 | Stop reason: HOSPADM

## 2022-01-01 RX ORDER — CEFAZOLIN SODIUM 2 G/100ML
2 INJECTION, SOLUTION INTRAVENOUS SEE ADMIN INSTRUCTIONS
Status: DISCONTINUED | OUTPATIENT
Start: 2022-01-01 | End: 2022-01-01

## 2022-01-01 RX ORDER — BUMETANIDE 2 MG/1
6 TABLET ORAL 2 TIMES DAILY
Qty: 540 TABLET | Refills: 3 | Status: ON HOLD | OUTPATIENT
Start: 2022-01-01 | End: 2023-01-01

## 2022-01-01 RX ORDER — WARFARIN SODIUM 2 MG/1
4 TABLET ORAL DAILY
COMMUNITY
Start: 2022-01-01 | End: 2023-01-01

## 2022-01-01 RX ORDER — NITROGLYCERIN 0.4 MG/1
0.4 TABLET SUBLINGUAL EVERY 5 MIN PRN
Status: DISCONTINUED | OUTPATIENT
Start: 2022-01-01 | End: 2022-01-01 | Stop reason: HOSPADM

## 2022-01-01 RX ORDER — MAGNESIUM OXIDE 400 MG/1
400 TABLET ORAL EVERY 4 HOURS
Status: DISCONTINUED | OUTPATIENT
Start: 2022-01-01 | End: 2022-01-01

## 2022-01-01 RX ORDER — WARFARIN SODIUM 1 MG/1
2 TABLET ORAL
Status: COMPLETED | OUTPATIENT
Start: 2022-01-01 | End: 2022-01-01

## 2022-01-01 RX ORDER — HYDRALAZINE HYDROCHLORIDE 100 MG/1
100 TABLET, FILM COATED ORAL 3 TIMES DAILY
Status: DISCONTINUED | OUTPATIENT
Start: 2022-01-01 | End: 2022-01-01 | Stop reason: HOSPADM

## 2022-01-01 RX ORDER — ONDANSETRON 2 MG/ML
4 INJECTION INTRAMUSCULAR; INTRAVENOUS EVERY 30 MIN PRN
Status: DISCONTINUED | OUTPATIENT
Start: 2022-01-01 | End: 2022-01-01 | Stop reason: HOSPADM

## 2022-01-01 RX ORDER — CHLOROTHIAZIDE SODIUM 500 MG/1
500 INJECTION INTRAVENOUS ONCE
Status: COMPLETED | OUTPATIENT
Start: 2022-01-01 | End: 2022-01-01

## 2022-01-01 RX ORDER — POLYETHYLENE GLYCOL 3350 17 G/17G
17 POWDER, FOR SOLUTION ORAL DAILY
Status: DISCONTINUED | OUTPATIENT
Start: 2022-01-01 | End: 2022-01-01

## 2022-01-01 RX ORDER — CEFAZOLIN SODIUM 2 G/100ML
2 INJECTION, SOLUTION INTRAVENOUS
Status: DISCONTINUED | OUTPATIENT
Start: 2022-01-01 | End: 2022-01-01

## 2022-01-01 RX ORDER — BISACODYL 10 MG
10 SUPPOSITORY, RECTAL RECTAL DAILY PRN
Status: DISCONTINUED | OUTPATIENT
Start: 2022-01-01 | End: 2022-01-01 | Stop reason: HOSPADM

## 2022-01-01 RX ORDER — BUMETANIDE 2 MG/1
4 TABLET ORAL 3 TIMES DAILY
Status: COMPLETED | OUTPATIENT
Start: 2022-01-01 | End: 2022-01-01

## 2022-01-01 RX ORDER — NOREPINEPHRINE BITARTRATE 0.06 MG/ML
.01-.1 INJECTION, SOLUTION INTRAVENOUS CONTINUOUS
Status: DISCONTINUED | OUTPATIENT
Start: 2022-01-01 | End: 2022-01-01

## 2022-01-01 RX ORDER — POTASSIUM CHLORIDE 750 MG/1
40 TABLET, EXTENDED RELEASE ORAL ONCE
Status: COMPLETED | OUTPATIENT
Start: 2022-01-01 | End: 2022-01-01

## 2022-01-01 RX ORDER — BUMETANIDE 2 MG/1
6 TABLET ORAL
Status: DISCONTINUED | OUTPATIENT
Start: 2022-01-01 | End: 2022-01-01

## 2022-01-01 RX ORDER — IOPAMIDOL 755 MG/ML
INJECTION, SOLUTION INTRAVASCULAR
Status: DISCONTINUED | OUTPATIENT
Start: 2022-01-01 | End: 2022-01-01 | Stop reason: HOSPADM

## 2022-01-01 RX ORDER — SODIUM CHLORIDE, SODIUM LACTATE, POTASSIUM CHLORIDE, CALCIUM CHLORIDE 600; 310; 30; 20 MG/100ML; MG/100ML; MG/100ML; MG/100ML
INJECTION, SOLUTION INTRAVENOUS CONTINUOUS
Status: DISCONTINUED | OUTPATIENT
Start: 2022-01-01 | End: 2022-01-01 | Stop reason: HOSPADM

## 2022-01-01 RX ORDER — ZOLPIDEM TARTRATE 5 MG/1
5 TABLET ORAL
Status: DISCONTINUED | OUTPATIENT
Start: 2022-01-01 | End: 2022-01-01 | Stop reason: HOSPADM

## 2022-01-01 RX ORDER — BUMETANIDE 0.25 MG/ML
3 INJECTION INTRAMUSCULAR; INTRAVENOUS ONCE
Status: COMPLETED | OUTPATIENT
Start: 2022-01-01 | End: 2022-01-01

## 2022-01-01 RX ORDER — POTASSIUM CHLORIDE 1500 MG/1
TABLET, EXTENDED RELEASE ORAL
Qty: 360 TABLET | Refills: 3 | Status: SHIPPED | OUTPATIENT
Start: 2022-01-01 | End: 2022-01-01

## 2022-01-01 RX ORDER — ACETAZOLAMIDE 500 MG/5ML
500 INJECTION, POWDER, LYOPHILIZED, FOR SOLUTION INTRAVENOUS ONCE
Status: COMPLETED | OUTPATIENT
Start: 2022-01-01 | End: 2022-01-01

## 2022-01-01 RX ORDER — LIDOCAINE HYDROCHLORIDE 20 MG/ML
INJECTION, SOLUTION INFILTRATION; PERINEURAL PRN
Status: DISCONTINUED | OUTPATIENT
Start: 2022-01-01 | End: 2022-01-01

## 2022-01-01 RX ORDER — POTASSIUM CHLORIDE 1500 MG/1
80 TABLET, EXTENDED RELEASE ORAL 2 TIMES DAILY
Status: DISCONTINUED | OUTPATIENT
Start: 2022-01-01 | End: 2022-01-01 | Stop reason: HOSPADM

## 2022-01-01 RX ORDER — ROPIVACAINE IN 0.9% SOD CHL/PF 0.1 %
.03-.125 PLASTIC BAG, INJECTION (ML) EPIDURAL CONTINUOUS
Status: DISCONTINUED | OUTPATIENT
Start: 2022-01-01 | End: 2022-01-01 | Stop reason: HOSPADM

## 2022-01-01 RX ORDER — MAGNESIUM OXIDE 400 MG/1
400 TABLET ORAL EVERY 4 HOURS
Status: CANCELLED | OUTPATIENT
Start: 2022-01-01 | End: 2022-01-01

## 2022-01-01 RX ORDER — MAGNESIUM OXIDE 400 MG/1
400 TABLET ORAL 2 TIMES DAILY
Qty: 30 TABLET | Refills: 1 | Status: ON HOLD | COMMUNITY
Start: 2022-01-01 | End: 2023-01-01

## 2022-01-01 RX ORDER — POTASSIUM CHLORIDE 20MEQ/15ML
80 LIQUID (ML) ORAL 3 TIMES DAILY
Status: DISCONTINUED | OUTPATIENT
Start: 2022-01-01 | End: 2022-01-01

## 2022-01-01 RX ORDER — DAPAGLIFLOZIN 5 MG/1
5 TABLET, FILM COATED ORAL DAILY
Status: DISCONTINUED | OUTPATIENT
Start: 2022-01-01 | End: 2022-01-01 | Stop reason: HOSPADM

## 2022-01-01 RX ORDER — MAGNESIUM HYDROXIDE/ALUMINUM HYDROXICE/SIMETHICONE 120; 1200; 1200 MG/30ML; MG/30ML; MG/30ML
30 SUSPENSION ORAL EVERY 4 HOURS PRN
Status: DISCONTINUED | OUTPATIENT
Start: 2022-01-01 | End: 2022-01-01 | Stop reason: HOSPADM

## 2022-01-01 RX ORDER — POTASSIUM CHLORIDE 750 MG/1
20 TABLET, EXTENDED RELEASE ORAL ONCE
Status: DISCONTINUED | OUTPATIENT
Start: 2022-01-01 | End: 2022-01-01 | Stop reason: CLARIF

## 2022-01-01 RX ORDER — CEFADROXIL 500 MG/1
500 CAPSULE ORAL 2 TIMES DAILY
Status: DISCONTINUED | OUTPATIENT
Start: 2022-01-01 | End: 2022-01-01 | Stop reason: HOSPADM

## 2022-01-01 RX ORDER — CEFAZOLIN SODIUM 2 G/100ML
2 INJECTION, SOLUTION INTRAVENOUS EVERY 8 HOURS
Status: DISCONTINUED | OUTPATIENT
Start: 2022-01-01 | End: 2022-01-01

## 2022-01-01 RX ORDER — CHLOROTHIAZIDE SODIUM 500 MG/1
1000 INJECTION INTRAVENOUS ONCE
Status: DISCONTINUED | OUTPATIENT
Start: 2022-01-01 | End: 2022-01-01

## 2022-01-01 RX ORDER — MAGNESIUM OXIDE 400 MG/1
400 TABLET ORAL DAILY
Status: DISCONTINUED | OUTPATIENT
Start: 2022-01-01 | End: 2022-01-01 | Stop reason: HOSPADM

## 2022-01-01 RX ORDER — PANTOPRAZOLE SODIUM 40 MG/1
40 TABLET, DELAYED RELEASE ORAL
Status: DISCONTINUED | OUTPATIENT
Start: 2022-01-01 | End: 2022-01-01 | Stop reason: HOSPADM

## 2022-01-01 RX ORDER — POTASSIUM CHLORIDE 20MEQ/15ML
100 LIQUID (ML) ORAL 3 TIMES DAILY
Status: DISCONTINUED | OUTPATIENT
Start: 2022-01-01 | End: 2022-01-01

## 2022-01-01 RX ORDER — AMLODIPINE BESYLATE 10 MG/1
10 TABLET ORAL DAILY
Status: DISCONTINUED | OUTPATIENT
Start: 2022-01-01 | End: 2022-01-01 | Stop reason: HOSPADM

## 2022-01-01 RX ORDER — POTASSIUM CHLORIDE 1500 MG/1
60 TABLET, EXTENDED RELEASE ORAL 3 TIMES DAILY
Status: DISCONTINUED | OUTPATIENT
Start: 2022-01-01 | End: 2022-01-01

## 2022-01-01 RX ORDER — FENTANYL CITRATE 50 UG/ML
50 INJECTION, SOLUTION INTRAMUSCULAR; INTRAVENOUS EVERY 5 MIN PRN
Status: DISCONTINUED | OUTPATIENT
Start: 2022-01-01 | End: 2022-01-01 | Stop reason: HOSPADM

## 2022-01-01 RX ORDER — POTASSIUM CHLORIDE 750 MG/1
20 TABLET, EXTENDED RELEASE ORAL ONCE
Status: DISCONTINUED | OUTPATIENT
Start: 2022-01-01 | End: 2022-01-01

## 2022-01-01 RX ORDER — WARFARIN SODIUM 1 MG/1
1 TABLET ORAL
Status: COMPLETED | OUTPATIENT
Start: 2022-01-01 | End: 2022-01-01

## 2022-01-01 RX ORDER — OXYCODONE HYDROCHLORIDE 10 MG/1
10 TABLET ORAL EVERY 4 HOURS PRN
Status: DISCONTINUED | OUTPATIENT
Start: 2022-01-01 | End: 2022-01-01 | Stop reason: HOSPADM

## 2022-01-01 RX ORDER — BUMETANIDE 0.25 MG/ML
4 INJECTION INTRAMUSCULAR; INTRAVENOUS ONCE
Status: COMPLETED | OUTPATIENT
Start: 2022-01-01 | End: 2022-01-01

## 2022-01-01 RX ORDER — MAGNESIUM OXIDE 400 MG/1
400 TABLET ORAL DAILY
Status: DISCONTINUED | OUTPATIENT
Start: 2022-01-01 | End: 2022-01-01

## 2022-01-01 RX ORDER — FENTANYL CITRATE 50 UG/ML
INJECTION, SOLUTION INTRAMUSCULAR; INTRAVENOUS PRN
Status: DISCONTINUED | OUTPATIENT
Start: 2022-01-01 | End: 2022-01-01

## 2022-01-01 RX ORDER — FINASTERIDE 5 MG/1
5 TABLET, FILM COATED ORAL DAILY
Status: DISCONTINUED | OUTPATIENT
Start: 2022-01-01 | End: 2022-01-01 | Stop reason: HOSPADM

## 2022-01-01 RX ORDER — RIFABUTIN 150 MG/1
300 CAPSULE ORAL DAILY
Status: DISCONTINUED | OUTPATIENT
Start: 2022-01-01 | End: 2022-01-01

## 2022-01-01 RX ORDER — FENTANYL CITRATE 50 UG/ML
25 INJECTION, SOLUTION INTRAMUSCULAR; INTRAVENOUS EVERY 5 MIN PRN
Status: DISCONTINUED | OUTPATIENT
Start: 2022-01-01 | End: 2022-01-01 | Stop reason: HOSPADM

## 2022-01-01 RX ORDER — ONDANSETRON 2 MG/ML
4 INJECTION INTRAMUSCULAR; INTRAVENOUS EVERY 6 HOURS PRN
Status: DISCONTINUED | OUTPATIENT
Start: 2022-01-01 | End: 2022-01-01 | Stop reason: HOSPADM

## 2022-01-01 RX ORDER — LIDOCAINE 40 MG/G
CREAM TOPICAL
Status: DISCONTINUED | OUTPATIENT
Start: 2022-01-01 | End: 2022-01-01 | Stop reason: HOSPADM

## 2022-01-01 RX ORDER — POTASSIUM CHLORIDE 20MEQ/15ML
60 LIQUID (ML) ORAL 3 TIMES DAILY
Status: DISCONTINUED | OUTPATIENT
Start: 2022-01-01 | End: 2022-01-01

## 2022-01-01 RX ORDER — ACETAMINOPHEN 650 MG/1
650 SUPPOSITORY RECTAL EVERY 4 HOURS PRN
Status: DISCONTINUED | OUTPATIENT
Start: 2022-01-01 | End: 2022-01-01 | Stop reason: HOSPADM

## 2022-01-01 RX ORDER — AMIODARONE HYDROCHLORIDE 200 MG/1
200 TABLET ORAL DAILY
Status: DISCONTINUED | OUTPATIENT
Start: 2022-01-01 | End: 2022-01-01 | Stop reason: HOSPADM

## 2022-01-01 RX ORDER — LIDOCAINE 40 MG/G
CREAM TOPICAL
Status: COMPLETED | OUTPATIENT
Start: 2022-01-01 | End: 2022-01-01

## 2022-01-01 RX ORDER — LIDOCAINE 40 MG/G
CREAM TOPICAL
Status: DISCONTINUED | OUTPATIENT
Start: 2022-01-01 | End: 2022-01-01

## 2022-01-01 RX ORDER — METHOCARBAMOL 500 MG/1
500 TABLET, FILM COATED ORAL
Status: COMPLETED | OUTPATIENT
Start: 2022-01-01 | End: 2022-01-01

## 2022-01-01 RX ORDER — METHOCARBAMOL 500 MG/1
500 TABLET, FILM COATED ORAL 4 TIMES DAILY PRN
Status: COMPLETED | OUTPATIENT
Start: 2022-01-01 | End: 2022-01-01

## 2022-01-01 RX ORDER — ACETAMINOPHEN 325 MG/1
650 TABLET ORAL EVERY 4 HOURS PRN
Status: DISCONTINUED | OUTPATIENT
Start: 2022-01-01 | End: 2022-01-01 | Stop reason: HOSPADM

## 2022-01-01 RX ORDER — POTASSIUM CHLORIDE 750 MG/1
40 TABLET, EXTENDED RELEASE ORAL ONCE
Status: DISCONTINUED | OUTPATIENT
Start: 2022-01-01 | End: 2022-01-01

## 2022-01-01 RX ORDER — AMOXICILLIN 250 MG
1 CAPSULE ORAL 2 TIMES DAILY PRN
Status: DISCONTINUED | OUTPATIENT
Start: 2022-01-01 | End: 2022-01-01 | Stop reason: HOSPADM

## 2022-01-01 RX ORDER — RIFABUTIN 150 MG/1
300 CAPSULE ORAL
Status: DISCONTINUED | OUTPATIENT
Start: 2022-01-01 | End: 2022-01-01

## 2022-01-01 RX ORDER — FENTANYL CITRATE 50 UG/ML
25 INJECTION, SOLUTION INTRAMUSCULAR; INTRAVENOUS
Status: DISCONTINUED | OUTPATIENT
Start: 2022-01-01 | End: 2022-01-01 | Stop reason: HOSPADM

## 2022-01-01 RX ORDER — BUMETANIDE 1 MG/1
6 TABLET ORAL 2 TIMES DAILY
Qty: 36 TABLET | Refills: 0 | Status: SHIPPED | OUTPATIENT
Start: 2022-01-01 | End: 2022-01-01

## 2022-01-01 RX ORDER — PROPOFOL 10 MG/ML
INJECTION, EMULSION INTRAVENOUS PRN
Status: DISCONTINUED | OUTPATIENT
Start: 2022-01-01 | End: 2022-01-01

## 2022-01-01 RX ORDER — PIPERACILLIN SODIUM, TAZOBACTAM SODIUM 2; .25 G/10ML; G/10ML
2.25 INJECTION, POWDER, LYOPHILIZED, FOR SOLUTION INTRAVENOUS EVERY 6 HOURS
Status: DISCONTINUED | OUTPATIENT
Start: 2022-01-01 | End: 2022-01-01

## 2022-01-01 RX ORDER — ASPIRIN 81 MG/1
81 TABLET ORAL DAILY
Status: DISCONTINUED | OUTPATIENT
Start: 2022-01-01 | End: 2022-01-01 | Stop reason: HOSPADM

## 2022-01-01 RX ORDER — MAGNESIUM HYDROXIDE 1200 MG/15ML
LIQUID ORAL
Status: DISPENSED
Start: 2022-01-01 | End: 2022-01-01

## 2022-01-01 RX ORDER — LEVOTHYROXINE SODIUM 75 UG/1
75 TABLET ORAL
Qty: 30 TABLET | Refills: 1 | Status: ON HOLD | OUTPATIENT
Start: 2022-01-01 | End: 2023-01-01

## 2022-01-01 RX ORDER — WARFARIN SODIUM 2 MG/1
2 TABLET ORAL
Status: COMPLETED | OUTPATIENT
Start: 2022-01-01 | End: 2022-01-01

## 2022-01-01 RX ORDER — POTASSIUM CHLORIDE 1500 MG/1
TABLET, EXTENDED RELEASE ORAL
Qty: 720 TABLET | Refills: 3 | Status: SHIPPED | OUTPATIENT
Start: 2022-01-01 | End: 2023-01-01

## 2022-01-01 RX ORDER — AMOXICILLIN 250 MG
1 CAPSULE ORAL 2 TIMES DAILY
Status: DISCONTINUED | OUTPATIENT
Start: 2022-01-01 | End: 2022-01-01 | Stop reason: HOSPADM

## 2022-01-01 RX ORDER — AMOXICILLIN 250 MG
2 CAPSULE ORAL 2 TIMES DAILY PRN
Status: DISCONTINUED | OUTPATIENT
Start: 2022-01-01 | End: 2022-01-01 | Stop reason: HOSPADM

## 2022-01-01 RX ORDER — VIT B COMP NO.3/FOLIC/C/BIOTIN 1 MG-60 MG
1 TABLET ORAL DAILY
Status: DISCONTINUED | OUTPATIENT
Start: 2022-01-01 | End: 2022-01-01 | Stop reason: HOSPADM

## 2022-01-01 RX ORDER — ASPIRIN 81 MG/1
81 TABLET, CHEWABLE ORAL DAILY
Status: DISCONTINUED | OUTPATIENT
Start: 2022-01-01 | End: 2022-01-01 | Stop reason: HOSPADM

## 2022-01-01 RX ORDER — HYDROCHLOROTHIAZIDE 25 MG/1
TABLET ORAL
Qty: 90 TABLET | Refills: 3 | Status: SHIPPED | OUTPATIENT
Start: 2022-01-01 | End: 2023-01-01

## 2022-01-01 RX ORDER — METOPROLOL TARTRATE 1 MG/ML
1-2 INJECTION, SOLUTION INTRAVENOUS EVERY 5 MIN PRN
Status: DISCONTINUED | OUTPATIENT
Start: 2022-01-01 | End: 2022-01-01 | Stop reason: HOSPADM

## 2022-01-01 RX ORDER — WARFARIN SODIUM 4 MG/1
4 TABLET ORAL
Status: DISCONTINUED | OUTPATIENT
Start: 2022-01-01 | End: 2022-01-01

## 2022-01-01 RX ORDER — PROCHLORPERAZINE MALEATE 5 MG
5 TABLET ORAL EVERY 6 HOURS PRN
Status: DISCONTINUED | OUTPATIENT
Start: 2022-01-01 | End: 2022-01-01 | Stop reason: HOSPADM

## 2022-01-01 RX ORDER — HALOPERIDOL 5 MG/ML
1 INJECTION INTRAMUSCULAR
Status: DISCONTINUED | OUTPATIENT
Start: 2022-01-01 | End: 2022-01-01 | Stop reason: HOSPADM

## 2022-01-01 RX ORDER — BUMETANIDE 1 MG/1
TABLET ORAL
Qty: 900 TABLET | Refills: 3 | Status: SHIPPED | OUTPATIENT
Start: 2022-01-01 | End: 2022-01-01

## 2022-01-01 RX ORDER — SODIUM CHLORIDE, SODIUM LACTATE, POTASSIUM CHLORIDE, CALCIUM CHLORIDE 600; 310; 30; 20 MG/100ML; MG/100ML; MG/100ML; MG/100ML
INJECTION, SOLUTION INTRAVENOUS CONTINUOUS PRN
Status: DISCONTINUED | OUTPATIENT
Start: 2022-01-01 | End: 2022-01-01

## 2022-01-01 RX ORDER — DAPAGLIFLOZIN 5 MG/1
5 TABLET, FILM COATED ORAL DAILY
Status: ON HOLD | COMMUNITY
End: 2023-01-01

## 2022-01-01 RX ORDER — PROCHLORPERAZINE 25 MG
12.5 SUPPOSITORY, RECTAL RECTAL EVERY 12 HOURS PRN
Status: DISCONTINUED | OUTPATIENT
Start: 2022-01-01 | End: 2022-01-01 | Stop reason: HOSPADM

## 2022-01-01 RX ORDER — FENTANYL CITRATE 50 UG/ML
INJECTION, SOLUTION INTRAMUSCULAR; INTRAVENOUS
Status: DISCONTINUED | OUTPATIENT
Start: 2022-01-01 | End: 2022-01-01 | Stop reason: HOSPADM

## 2022-01-01 RX ORDER — FERROUS SULFATE 325(65) MG
325 TABLET ORAL
Status: ON HOLD | COMMUNITY
End: 2023-01-01

## 2022-01-01 RX ORDER — POTASSIUM CHLORIDE 20MEQ/15ML
60 LIQUID (ML) ORAL 2 TIMES DAILY
Status: DISCONTINUED | OUTPATIENT
Start: 2022-01-01 | End: 2022-01-01 | Stop reason: HOSPADM

## 2022-01-01 RX ORDER — DEXMEDETOMIDINE HYDROCHLORIDE 4 UG/ML
.1-1.2 INJECTION, SOLUTION INTRAVENOUS CONTINUOUS
Status: DISCONTINUED | OUTPATIENT
Start: 2022-01-01 | End: 2022-01-01

## 2022-01-01 RX ORDER — LIDOCAINE HYDROCHLORIDE 10 MG/ML
INJECTION, SOLUTION EPIDURAL; INFILTRATION; INTRACAUDAL; PERINEURAL
Status: COMPLETED
Start: 2022-01-01 | End: 2022-01-01

## 2022-01-01 RX ORDER — POTASSIUM CHLORIDE 1500 MG/1
60 TABLET, EXTENDED RELEASE ORAL 2 TIMES DAILY
Status: DISCONTINUED | OUTPATIENT
Start: 2022-01-01 | End: 2022-01-01

## 2022-01-01 RX ORDER — POTASSIUM CHLORIDE 20MEQ/15ML
40 LIQUID (ML) ORAL DAILY
Status: DISCONTINUED | OUTPATIENT
Start: 2022-01-01 | End: 2022-01-01

## 2022-01-01 RX ORDER — ONDANSETRON 2 MG/ML
INJECTION INTRAMUSCULAR; INTRAVENOUS PRN
Status: DISCONTINUED | OUTPATIENT
Start: 2022-01-01 | End: 2022-01-01

## 2022-01-01 RX ORDER — PHENYLEPHRINE HCL IN 0.9% NACL 50MG/250ML
.1-6 PLASTIC BAG, INJECTION (ML) INTRAVENOUS CONTINUOUS
Status: DISCONTINUED | OUTPATIENT
Start: 2022-01-01 | End: 2022-01-01

## 2022-01-01 RX ORDER — CEPHALEXIN 500 MG/1
500 CAPSULE ORAL 4 TIMES DAILY
Qty: 120 CAPSULE | Refills: 0 | Status: ON HOLD | OUTPATIENT
Start: 2022-01-01 | End: 2022-01-01

## 2022-01-01 RX ORDER — BUMETANIDE 1 MG/1
5 TABLET ORAL 2 TIMES DAILY
Qty: 900 TABLET | Refills: 3 | Status: SHIPPED | OUTPATIENT
Start: 2022-01-01 | End: 2022-01-01

## 2022-01-01 RX ORDER — POTASSIUM CHLORIDE 1500 MG/1
TABLET, EXTENDED RELEASE ORAL
Qty: 360 TABLET | Refills: 3 | COMMUNITY
Start: 2022-01-01 | End: 2022-01-01

## 2022-01-01 RX ORDER — BUMETANIDE 0.25 MG/ML
4 INJECTION INTRAMUSCULAR; INTRAVENOUS ONCE
Status: DISCONTINUED | OUTPATIENT
Start: 2022-01-01 | End: 2022-01-01

## 2022-01-01 RX ORDER — POTASSIUM CHLORIDE 20MEQ/15ML
20 LIQUID (ML) ORAL ONCE
Status: COMPLETED | OUTPATIENT
Start: 2022-01-01 | End: 2022-01-01

## 2022-01-01 RX ORDER — HYDROMORPHONE HCL IN WATER/PF 6 MG/30 ML
0.4 PATIENT CONTROLLED ANALGESIA SYRINGE INTRAVENOUS EVERY 5 MIN PRN
Status: DISCONTINUED | OUTPATIENT
Start: 2022-01-01 | End: 2022-01-01 | Stop reason: HOSPADM

## 2022-01-01 RX ORDER — BUMETANIDE 0.25 MG/ML
2 INJECTION INTRAMUSCULAR; INTRAVENOUS ONCE
Status: COMPLETED | OUTPATIENT
Start: 2022-01-01 | End: 2022-01-01

## 2022-01-01 RX ADMIN — LIDOCAINE: 40 CREAM TOPICAL at 07:47

## 2022-01-01 RX ADMIN — AMLODIPINE BESYLATE 10 MG: 10 TABLET ORAL at 09:12

## 2022-01-01 RX ADMIN — Medication 1 TABLET: at 07:56

## 2022-01-01 RX ADMIN — ISOSORBIDE MONONITRATE 90 MG: 30 TABLET, EXTENDED RELEASE ORAL at 09:12

## 2022-01-01 RX ADMIN — AMLODIPINE BESYLATE 10 MG: 10 TABLET ORAL at 09:20

## 2022-01-01 RX ADMIN — POTASSIUM CHLORIDE 80 MEQ: 1500 TABLET, EXTENDED RELEASE ORAL at 20:19

## 2022-01-01 RX ADMIN — ZOLPIDEM TARTRATE 5 MG: 5 TABLET ORAL at 21:42

## 2022-01-01 RX ADMIN — TAMSULOSIN HYDROCHLORIDE 0.8 MG: 0.4 CAPSULE ORAL at 08:55

## 2022-01-01 RX ADMIN — PANTOPRAZOLE SODIUM 40 MG: 40 TABLET, DELAYED RELEASE ORAL at 09:04

## 2022-01-01 RX ADMIN — FINASTERIDE 5 MG: 5 TABLET, FILM COATED ORAL at 08:38

## 2022-01-01 RX ADMIN — POTASSIUM CHLORIDE 40 MEQ: 40 SOLUTION ORAL at 06:59

## 2022-01-01 RX ADMIN — LEVOTHYROXINE SODIUM 75 MCG: 0.07 TABLET ORAL at 09:22

## 2022-01-01 RX ADMIN — WARFARIN SODIUM 3.5 MG: 2.5 TABLET ORAL at 17:56

## 2022-01-01 RX ADMIN — PANTOPRAZOLE SODIUM 40 MG: 40 TABLET, DELAYED RELEASE ORAL at 06:28

## 2022-01-01 RX ADMIN — FLUOXETINE 30 MG: 20 CAPSULE ORAL at 06:54

## 2022-01-01 RX ADMIN — LEVOTHYROXINE SODIUM 75 MCG: 0.07 TABLET ORAL at 09:12

## 2022-01-01 RX ADMIN — ALLOPURINOL 200 MG: 100 TABLET ORAL at 09:20

## 2022-01-01 RX ADMIN — CEFAZOLIN SODIUM 2 G: 2 INJECTION, SOLUTION INTRAVENOUS at 21:17

## 2022-01-01 RX ADMIN — ISOSORBIDE MONONITRATE 90 MG: 30 TABLET, EXTENDED RELEASE ORAL at 08:53

## 2022-01-01 RX ADMIN — Medication 1 HALF-TAB: at 08:47

## 2022-01-01 RX ADMIN — Medication 1 CAPSULE: at 09:52

## 2022-01-01 RX ADMIN — AMLODIPINE BESYLATE 10 MG: 10 TABLET ORAL at 08:52

## 2022-01-01 RX ADMIN — HYDRALAZINE HYDROCHLORIDE 100 MG: 100 TABLET, FILM COATED ORAL at 21:07

## 2022-01-01 RX ADMIN — ASPIRIN 81 MG: 81 TABLET, COATED ORAL at 07:53

## 2022-01-01 RX ADMIN — ISOSORBIDE MONONITRATE 90 MG: 60 TABLET, EXTENDED RELEASE ORAL at 07:53

## 2022-01-01 RX ADMIN — ALLOPURINOL 200 MG: 100 TABLET ORAL at 09:04

## 2022-01-01 RX ADMIN — Medication 400 MG: at 14:23

## 2022-01-01 RX ADMIN — INSULIN ASPART 1 UNITS: 100 INJECTION, SOLUTION INTRAVENOUS; SUBCUTANEOUS at 16:32

## 2022-01-01 RX ADMIN — HYDRALAZINE HYDROCHLORIDE 100 MG: 100 TABLET, FILM COATED ORAL at 09:06

## 2022-01-01 RX ADMIN — MAGNESIUM OXIDE TAB 400 MG (241.3 MG ELEMENTAL MG) 400 MG: 400 (241.3 MG) TAB at 04:26

## 2022-01-01 RX ADMIN — ISOSORBIDE MONONITRATE 90 MG: 30 TABLET, EXTENDED RELEASE ORAL at 09:27

## 2022-01-01 RX ADMIN — HYDRALAZINE HYDROCHLORIDE 100 MG: 100 TABLET, FILM COATED ORAL at 20:10

## 2022-01-01 RX ADMIN — WARFARIN SODIUM 2 MG: 1 TABLET ORAL at 17:34

## 2022-01-01 RX ADMIN — WARFARIN SODIUM 4 MG: 4 TABLET ORAL at 17:10

## 2022-01-01 RX ADMIN — FLUOXETINE 30 MG: 20 CAPSULE ORAL at 08:55

## 2022-01-01 RX ADMIN — SENNOSIDES AND DOCUSATE SODIUM 1 TABLET: 8.6; 5 TABLET ORAL at 10:23

## 2022-01-01 RX ADMIN — HYDRALAZINE HYDROCHLORIDE 100 MG: 100 TABLET, FILM COATED ORAL at 19:49

## 2022-01-01 RX ADMIN — DAPAGLIFLOZIN 5 MG: 5 TABLET, FILM COATED ORAL at 09:31

## 2022-01-01 RX ADMIN — CEFAZOLIN SODIUM 2 G: 2 INJECTION, SOLUTION INTRAVENOUS at 12:39

## 2022-01-01 RX ADMIN — FINASTERIDE 5 MG: 5 TABLET, FILM COATED ORAL at 08:08

## 2022-01-01 RX ADMIN — POTASSIUM CHLORIDE 40 MEQ: 750 TABLET, EXTENDED RELEASE ORAL at 07:53

## 2022-01-01 RX ADMIN — HYDRALAZINE HYDROCHLORIDE 100 MG: 100 TABLET, FILM COATED ORAL at 14:28

## 2022-01-01 RX ADMIN — MAGNESIUM OXIDE TAB 400 MG (241.3 MG ELEMENTAL MG) 400 MG: 400 (241.3 MG) TAB at 08:55

## 2022-01-01 RX ADMIN — CHLOROTHIAZIDE SODIUM 500 MG: 500 INJECTION, POWDER, LYOPHILIZED, FOR SOLUTION INTRAVENOUS at 15:42

## 2022-01-01 RX ADMIN — BUMETANIDE 4 MG: 2 TABLET ORAL at 20:16

## 2022-01-01 RX ADMIN — TAMSULOSIN HYDROCHLORIDE 0.8 MG: 0.4 CAPSULE ORAL at 09:18

## 2022-01-01 RX ADMIN — TAMSULOSIN HYDROCHLORIDE 0.8 MG: 0.4 CAPSULE ORAL at 08:47

## 2022-01-01 RX ADMIN — ASPIRIN 81 MG: 81 TABLET, CHEWABLE ORAL at 09:16

## 2022-01-01 RX ADMIN — HYDRALAZINE HYDROCHLORIDE 100 MG: 100 TABLET, FILM COATED ORAL at 17:33

## 2022-01-01 RX ADMIN — DAPAGLIFLOZIN 5 MG: 5 TABLET, FILM COATED ORAL at 08:52

## 2022-01-01 RX ADMIN — MAGNESIUM OXIDE TAB 400 MG (241.3 MG ELEMENTAL MG) 400 MG: 400 (241.3 MG) TAB at 10:30

## 2022-01-01 RX ADMIN — ISOSORBIDE MONONITRATE 90 MG: 30 TABLET, EXTENDED RELEASE ORAL at 09:17

## 2022-01-01 RX ADMIN — INSULIN ASPART 1 UNITS: 100 INJECTION, SOLUTION INTRAVENOUS; SUBCUTANEOUS at 13:09

## 2022-01-01 RX ADMIN — HYDRALAZINE HYDROCHLORIDE 100 MG: 100 TABLET, FILM COATED ORAL at 20:25

## 2022-01-01 RX ADMIN — POTASSIUM CHLORIDE 80 MEQ: 40 SOLUTION ORAL at 21:07

## 2022-01-01 RX ADMIN — Medication 400 MG: at 05:04

## 2022-01-01 RX ADMIN — LEVOTHYROXINE SODIUM 75 MCG: 0.07 TABLET ORAL at 08:55

## 2022-01-01 RX ADMIN — BUMETANIDE 5 MG: 1 TABLET ORAL at 16:14

## 2022-01-01 RX ADMIN — FLUOXETINE 30 MG: 20 CAPSULE ORAL at 08:12

## 2022-01-01 RX ADMIN — BUMETANIDE 2 MG/HR: 0.25 INJECTION INTRAMUSCULAR; INTRAVENOUS at 20:49

## 2022-01-01 RX ADMIN — AMLODIPINE BESYLATE 10 MG: 10 TABLET ORAL at 09:08

## 2022-01-01 RX ADMIN — DAPTOMYCIN 500 MG: 500 INJECTION, POWDER, LYOPHILIZED, FOR SOLUTION INTRAVENOUS at 13:09

## 2022-01-01 RX ADMIN — POTASSIUM CHLORIDE 80 MEQ: 1500 TABLET, EXTENDED RELEASE ORAL at 09:31

## 2022-01-01 RX ADMIN — BUMETANIDE 5 MG: 1 TABLET ORAL at 20:24

## 2022-01-01 RX ADMIN — FLUOXETINE 30 MG: 20 CAPSULE ORAL at 08:37

## 2022-01-01 RX ADMIN — FLUOXETINE 30 MG: 20 CAPSULE ORAL at 08:45

## 2022-01-01 RX ADMIN — FINASTERIDE 5 MG: 5 TABLET, FILM COATED ORAL at 09:24

## 2022-01-01 RX ADMIN — AMLODIPINE BESYLATE 10 MG: 10 TABLET ORAL at 09:25

## 2022-01-01 RX ADMIN — CEFAZOLIN SODIUM 2 G: 2 INJECTION, SOLUTION INTRAVENOUS at 13:17

## 2022-01-01 RX ADMIN — VANCOMYCIN HYDROCHLORIDE 1500 MG: 10 INJECTION, POWDER, LYOPHILIZED, FOR SOLUTION INTRAVENOUS at 21:42

## 2022-01-01 RX ADMIN — AMLODIPINE BESYLATE 10 MG: 10 TABLET ORAL at 06:53

## 2022-01-01 RX ADMIN — BUMETANIDE 2 MG/HR: 0.25 INJECTION INTRAMUSCULAR; INTRAVENOUS at 17:08

## 2022-01-01 RX ADMIN — CEFAZOLIN SODIUM 2 G: 2 INJECTION, SOLUTION INTRAVENOUS at 06:19

## 2022-01-01 RX ADMIN — POTASSIUM CHLORIDE 80 MEQ: 1500 TABLET, EXTENDED RELEASE ORAL at 08:17

## 2022-01-01 RX ADMIN — INSULIN ASPART 1 UNITS: 100 INJECTION, SOLUTION INTRAVENOUS; SUBCUTANEOUS at 17:32

## 2022-01-01 RX ADMIN — ASPIRIN 81 MG: 81 TABLET, COATED ORAL at 08:12

## 2022-01-01 RX ADMIN — Medication 0.2 MCG/KG/MIN: at 08:52

## 2022-01-01 RX ADMIN — PROPOFOL 110 MG: 10 INJECTION, EMULSION INTRAVENOUS at 08:42

## 2022-01-01 RX ADMIN — POTASSIUM CHLORIDE 60 MEQ: 1500 TABLET, EXTENDED RELEASE ORAL at 20:13

## 2022-01-01 RX ADMIN — BUMETANIDE 5 MG: 1 TABLET ORAL at 15:21

## 2022-01-01 RX ADMIN — CEFADROXIL 500 MG: 500 CAPSULE ORAL at 08:12

## 2022-01-01 RX ADMIN — TAMSULOSIN HYDROCHLORIDE 0.8 MG: 0.4 CAPSULE ORAL at 09:27

## 2022-01-01 RX ADMIN — AMLODIPINE BESYLATE 10 MG: 10 TABLET ORAL at 08:37

## 2022-01-01 RX ADMIN — WARFARIN SODIUM 4 MG: 4 TABLET ORAL at 18:00

## 2022-01-01 RX ADMIN — POTASSIUM CHLORIDE 100 MEQ: 20 SOLUTION ORAL at 08:38

## 2022-01-01 RX ADMIN — LIDOCAINE HYDROCHLORIDE 1 ML: 10 INJECTION, SOLUTION EPIDURAL; INFILTRATION; INTRACAUDAL; PERINEURAL at 12:49

## 2022-01-01 RX ADMIN — HYDRALAZINE HYDROCHLORIDE 100 MG: 100 TABLET, FILM COATED ORAL at 13:17

## 2022-01-01 RX ADMIN — BUMETANIDE 4 MG: 2 TABLET ORAL at 14:05

## 2022-01-01 RX ADMIN — ISOSORBIDE MONONITRATE 90 MG: 60 TABLET, EXTENDED RELEASE ORAL at 09:05

## 2022-01-01 RX ADMIN — ZOLPIDEM TARTRATE 5 MG: 5 TABLET ORAL at 22:10

## 2022-01-01 RX ADMIN — POTASSIUM CHLORIDE 80 MEQ: 1500 TABLET, EXTENDED RELEASE ORAL at 06:59

## 2022-01-01 RX ADMIN — POTASSIUM CHLORIDE 80 MEQ: 40 SOLUTION ORAL at 09:09

## 2022-01-01 RX ADMIN — MAGNESIUM OXIDE TAB 400 MG (241.3 MG ELEMENTAL MG) 400 MG: 400 (241.3 MG) TAB at 09:06

## 2022-01-01 RX ADMIN — CEFAZOLIN SODIUM 2 G: 2 INJECTION, SOLUTION INTRAVENOUS at 11:28

## 2022-01-01 RX ADMIN — FLUOXETINE 30 MG: 20 CAPSULE ORAL at 09:07

## 2022-01-01 RX ADMIN — MAGNESIUM OXIDE TAB 400 MG (241.3 MG ELEMENTAL MG) 400 MG: 400 (241.3 MG) TAB at 16:25

## 2022-01-01 RX ADMIN — LIDOCAINE HYDROCHLORIDE: 10 INJECTION, SOLUTION EPIDURAL; INFILTRATION; INTRACAUDAL; PERINEURAL at 09:36

## 2022-01-01 RX ADMIN — HYDRALAZINE HYDROCHLORIDE 100 MG: 100 TABLET, FILM COATED ORAL at 14:23

## 2022-01-01 RX ADMIN — ATORVASTATIN CALCIUM 20 MG: 20 TABLET, FILM COATED ORAL at 09:08

## 2022-01-01 RX ADMIN — BUMETANIDE 4 MG: 0.25 INJECTION, SOLUTION INTRAMUSCULAR; INTRAVENOUS at 03:12

## 2022-01-01 RX ADMIN — POTASSIUM CHLORIDE 80 MEQ: 1500 TABLET, EXTENDED RELEASE ORAL at 08:11

## 2022-01-01 RX ADMIN — TAMSULOSIN HYDROCHLORIDE 0.8 MG: 0.4 CAPSULE ORAL at 09:05

## 2022-01-01 RX ADMIN — HYDRALAZINE HYDROCHLORIDE 100 MG: 100 TABLET, FILM COATED ORAL at 14:29

## 2022-01-01 RX ADMIN — POTASSIUM CHLORIDE 80 MEQ: 40 SOLUTION ORAL at 13:17

## 2022-01-01 RX ADMIN — ZOLPIDEM TARTRATE 5 MG: 5 TABLET ORAL at 21:02

## 2022-01-01 RX ADMIN — Medication 1 TABLET: at 09:16

## 2022-01-01 RX ADMIN — ASPIRIN 81 MG: 81 TABLET, COATED ORAL at 08:47

## 2022-01-01 RX ADMIN — DAPAGLIFLOZIN 5 MG: 5 TABLET, FILM COATED ORAL at 06:59

## 2022-01-01 RX ADMIN — Medication 400 MG: at 09:12

## 2022-01-01 RX ADMIN — HYDRALAZINE HYDROCHLORIDE 100 MG: 100 TABLET, FILM COATED ORAL at 20:19

## 2022-01-01 RX ADMIN — Medication 400 MG: at 00:49

## 2022-01-01 RX ADMIN — VANCOMYCIN HYDROCHLORIDE 1250 MG: 10 INJECTION, POWDER, LYOPHILIZED, FOR SOLUTION INTRAVENOUS at 21:29

## 2022-01-01 RX ADMIN — PANTOPRAZOLE SODIUM 40 MG: 40 TABLET, DELAYED RELEASE ORAL at 08:45

## 2022-01-01 RX ADMIN — WARFARIN SODIUM 3 MG: 3 TABLET ORAL at 17:12

## 2022-01-01 RX ADMIN — AMIODARONE HYDROCHLORIDE 200 MG: 200 TABLET ORAL at 09:12

## 2022-01-01 RX ADMIN — LEVOTHYROXINE SODIUM 75 MCG: 0.07 TABLET ORAL at 06:53

## 2022-01-01 RX ADMIN — DAPAGLIFLOZIN 5 MG: 5 TABLET, FILM COATED ORAL at 08:09

## 2022-01-01 RX ADMIN — Medication 1 CAPSULE: at 08:38

## 2022-01-01 RX ADMIN — LEVOTHYROXINE SODIUM 75 MCG: 0.07 TABLET ORAL at 09:18

## 2022-01-01 RX ADMIN — ZOLPIDEM TARTRATE 5 MG: 5 TABLET ORAL at 21:30

## 2022-01-01 RX ADMIN — TAMSULOSIN HYDROCHLORIDE 0.8 MG: 0.4 CAPSULE ORAL at 07:54

## 2022-01-01 RX ADMIN — ALLOPURINOL 200 MG: 100 TABLET ORAL at 08:47

## 2022-01-01 RX ADMIN — POTASSIUM CHLORIDE 40 MEQ: 1500 TABLET, EXTENDED RELEASE ORAL at 13:08

## 2022-01-01 RX ADMIN — Medication 1 TABLET: at 08:45

## 2022-01-01 RX ADMIN — HYDRALAZINE HYDROCHLORIDE 100 MG: 100 TABLET, FILM COATED ORAL at 20:41

## 2022-01-01 RX ADMIN — HYDRALAZINE HYDROCHLORIDE 100 MG: 100 TABLET, FILM COATED ORAL at 06:53

## 2022-01-01 RX ADMIN — POTASSIUM CHLORIDE 80 MEQ: 1500 TABLET, EXTENDED RELEASE ORAL at 09:11

## 2022-01-01 RX ADMIN — WARFARIN SODIUM 2 MG: 1 TABLET ORAL at 18:42

## 2022-01-01 RX ADMIN — MAGNESIUM OXIDE TAB 400 MG (241.3 MG ELEMENTAL MG) 400 MG: 400 (241.3 MG) TAB at 08:44

## 2022-01-01 RX ADMIN — PIPERACILLIN SODIUM AND TAZOBACTAM SODIUM 4.5 G: 4; .5 INJECTION, POWDER, LYOPHILIZED, FOR SOLUTION INTRAVENOUS at 08:02

## 2022-01-01 RX ADMIN — LEVOTHYROXINE SODIUM 75 MCG: 0.07 TABLET ORAL at 06:46

## 2022-01-01 RX ADMIN — BUMETANIDE 2 MG/HR: 0.25 INJECTION INTRAMUSCULAR; INTRAVENOUS at 16:06

## 2022-01-01 RX ADMIN — POTASSIUM CHLORIDE 80 MEQ: 1500 TABLET, EXTENDED RELEASE ORAL at 20:00

## 2022-01-01 RX ADMIN — WARFARIN SODIUM 3 MG: 3 TABLET ORAL at 17:33

## 2022-01-01 RX ADMIN — PANTOPRAZOLE SODIUM 40 MG: 40 TABLET, DELAYED RELEASE ORAL at 09:12

## 2022-01-01 RX ADMIN — ZOLPIDEM TARTRATE 5 MG: 5 TABLET ORAL at 21:24

## 2022-01-01 RX ADMIN — CEFADROXIL 500 MG: 500 CAPSULE ORAL at 19:23

## 2022-01-01 RX ADMIN — FINASTERIDE 5 MG: 5 TABLET, FILM COATED ORAL at 09:03

## 2022-01-01 RX ADMIN — AMIODARONE HYDROCHLORIDE 200 MG: 200 TABLET ORAL at 09:53

## 2022-01-01 RX ADMIN — FLUOXETINE 30 MG: 20 CAPSULE ORAL at 09:23

## 2022-01-01 RX ADMIN — POTASSIUM CHLORIDE 80 MEQ: 1500 TABLET, EXTENDED RELEASE ORAL at 08:54

## 2022-01-01 RX ADMIN — MAGNESIUM OXIDE TAB 400 MG (241.3 MG ELEMENTAL MG) 400 MG: 400 (241.3 MG) TAB at 09:16

## 2022-01-01 RX ADMIN — POTASSIUM CHLORIDE 40 MEQ: 750 TABLET, EXTENDED RELEASE ORAL at 14:41

## 2022-01-01 RX ADMIN — POTASSIUM CHLORIDE 80 MEQ: 1500 TABLET, EXTENDED RELEASE ORAL at 08:59

## 2022-01-01 RX ADMIN — LEVOTHYROXINE SODIUM 75 MCG: 0.07 TABLET ORAL at 06:27

## 2022-01-01 RX ADMIN — HYDRALAZINE HYDROCHLORIDE 100 MG: 100 TABLET, FILM COATED ORAL at 20:40

## 2022-01-01 RX ADMIN — BUMETANIDE 2 MG/HR: 0.25 INJECTION INTRAMUSCULAR; INTRAVENOUS at 05:02

## 2022-01-01 RX ADMIN — DAPAGLIFLOZIN 5 MG: 5 TABLET, FILM COATED ORAL at 08:37

## 2022-01-01 RX ADMIN — CEFAZOLIN SODIUM 2 G: 2 INJECTION, SOLUTION INTRAVENOUS at 22:11

## 2022-01-01 RX ADMIN — POTASSIUM CHLORIDE 80 MEQ: 1500 TABLET, EXTENDED RELEASE ORAL at 19:45

## 2022-01-01 RX ADMIN — Medication 400 MG: at 20:40

## 2022-01-01 RX ADMIN — POTASSIUM CHLORIDE 80 MEQ: 40 SOLUTION ORAL at 19:23

## 2022-01-01 RX ADMIN — CEFADROXIL 500 MG: 500 CAPSULE ORAL at 20:35

## 2022-01-01 RX ADMIN — ISOSORBIDE MONONITRATE 90 MG: 60 TABLET, EXTENDED RELEASE ORAL at 08:07

## 2022-01-01 RX ADMIN — ALLOPURINOL 200 MG: 100 TABLET ORAL at 08:09

## 2022-01-01 RX ADMIN — POTASSIUM CHLORIDE 40 MEQ: 40 SOLUTION ORAL at 08:54

## 2022-01-01 RX ADMIN — ASPIRIN 81 MG: 81 TABLET, CHEWABLE ORAL at 08:37

## 2022-01-01 RX ADMIN — TAMSULOSIN HYDROCHLORIDE 0.8 MG: 0.4 CAPSULE ORAL at 08:06

## 2022-01-01 RX ADMIN — POTASSIUM CHLORIDE 40 MEQ: 1500 TABLET, EXTENDED RELEASE ORAL at 11:50

## 2022-01-01 RX ADMIN — AMIODARONE HYDROCHLORIDE 200 MG: 200 TABLET ORAL at 06:55

## 2022-01-01 RX ADMIN — WARFARIN SODIUM 2 MG: 2 TABLET ORAL at 18:24

## 2022-01-01 RX ADMIN — BUMETANIDE 2 MG: 0.25 INJECTION, SOLUTION INTRAMUSCULAR; INTRAVENOUS at 19:40

## 2022-01-01 RX ADMIN — HYDRALAZINE HYDROCHLORIDE 100 MG: 100 TABLET, FILM COATED ORAL at 16:55

## 2022-01-01 RX ADMIN — Medication 0.5 MG/HR: at 00:11

## 2022-01-01 RX ADMIN — ATORVASTATIN CALCIUM 20 MG: 20 TABLET, FILM COATED ORAL at 08:46

## 2022-01-01 RX ADMIN — AMIODARONE HYDROCHLORIDE 200 MG: 200 TABLET ORAL at 08:55

## 2022-01-01 RX ADMIN — HYDRALAZINE HYDROCHLORIDE 100 MG: 100 TABLET, FILM COATED ORAL at 09:27

## 2022-01-01 RX ADMIN — POTASSIUM CHLORIDE 40 MEQ: 750 TABLET, EXTENDED RELEASE ORAL at 09:10

## 2022-01-01 RX ADMIN — WARFARIN SODIUM 1 MG: 1 TABLET ORAL at 18:00

## 2022-01-01 RX ADMIN — BUMETANIDE 4 MG: 2 TABLET ORAL at 19:41

## 2022-01-01 RX ADMIN — LEVOTHYROXINE SODIUM 75 MCG: 0.07 TABLET ORAL at 11:29

## 2022-01-01 RX ADMIN — HYDRALAZINE HYDROCHLORIDE 100 MG: 100 TABLET, FILM COATED ORAL at 09:13

## 2022-01-01 RX ADMIN — Medication 1 TABLET: at 08:37

## 2022-01-01 RX ADMIN — ZOLPIDEM TARTRATE 5 MG: 5 TABLET ORAL at 21:54

## 2022-01-01 RX ADMIN — DAPTOMYCIN 500 MG: 500 INJECTION, POWDER, LYOPHILIZED, FOR SOLUTION INTRAVENOUS at 15:11

## 2022-01-01 RX ADMIN — CEFADROXIL 500 MG: 500 CAPSULE ORAL at 08:37

## 2022-01-01 RX ADMIN — CEFAZOLIN SODIUM 2 G: 2 INJECTION, SOLUTION INTRAVENOUS at 04:10

## 2022-01-01 RX ADMIN — CEFADROXIL 500 MG: 500 CAPSULE ORAL at 09:20

## 2022-01-01 RX ADMIN — POTASSIUM CHLORIDE 60 MEQ: 40 SOLUTION ORAL at 20:36

## 2022-01-01 RX ADMIN — AMIODARONE HYDROCHLORIDE 200 MG: 200 TABLET ORAL at 09:26

## 2022-01-01 RX ADMIN — ASPIRIN 81 MG: 81 TABLET, COATED ORAL at 09:20

## 2022-01-01 RX ADMIN — AMIODARONE HYDROCHLORIDE 200 MG: 200 TABLET ORAL at 08:44

## 2022-01-01 RX ADMIN — TAMSULOSIN HYDROCHLORIDE 0.8 MG: 0.4 CAPSULE ORAL at 08:53

## 2022-01-01 RX ADMIN — LEVOTHYROXINE SODIUM 75 MCG: 0.07 TABLET ORAL at 08:13

## 2022-01-01 RX ADMIN — CEFAZOLIN SODIUM 2 G: 2 INJECTION, SOLUTION INTRAVENOUS at 16:56

## 2022-01-01 RX ADMIN — POTASSIUM CHLORIDE 60 MEQ: 1500 TABLET, EXTENDED RELEASE ORAL at 23:17

## 2022-01-01 RX ADMIN — ZOLPIDEM TARTRATE 5 MG: 5 TABLET ORAL at 21:38

## 2022-01-01 RX ADMIN — MAGNESIUM OXIDE TAB 400 MG (241.3 MG ELEMENTAL MG) 400 MG: 400 (241.3 MG) TAB at 13:08

## 2022-01-01 RX ADMIN — CEFAZOLIN SODIUM 2 G: 2 INJECTION, SOLUTION INTRAVENOUS at 22:00

## 2022-01-01 RX ADMIN — POTASSIUM CHLORIDE 20 MEQ: 20 SOLUTION ORAL at 11:54

## 2022-01-01 RX ADMIN — Medication 400 MG: at 12:40

## 2022-01-01 RX ADMIN — Medication 400 MG: at 09:08

## 2022-01-01 RX ADMIN — BUMETANIDE 4 MG: 2 TABLET ORAL at 13:17

## 2022-01-01 RX ADMIN — FINASTERIDE 5 MG: 5 TABLET, FILM COATED ORAL at 06:56

## 2022-01-01 RX ADMIN — POTASSIUM CHLORIDE 40 MEQ: 1500 TABLET, EXTENDED RELEASE ORAL at 14:52

## 2022-01-01 RX ADMIN — SILVER NITRATE APPLICATORS 2 APPLICATOR: 25; 75 STICK TOPICAL at 15:17

## 2022-01-01 RX ADMIN — AMIODARONE HYDROCHLORIDE 200 MG: 200 TABLET ORAL at 07:54

## 2022-01-01 RX ADMIN — SODIUM CHLORIDE 150 MG: 9 INJECTION, SOLUTION INTRAVENOUS at 20:34

## 2022-01-01 RX ADMIN — ZOLPIDEM TARTRATE 5 MG: 5 TABLET ORAL at 21:33

## 2022-01-01 RX ADMIN — HYDRALAZINE HYDROCHLORIDE 100 MG: 100 TABLET, FILM COATED ORAL at 09:16

## 2022-01-01 RX ADMIN — AMLODIPINE BESYLATE 10 MG: 10 TABLET ORAL at 08:46

## 2022-01-01 RX ADMIN — HYDRALAZINE HYDROCHLORIDE 100 MG: 100 TABLET, FILM COATED ORAL at 22:00

## 2022-01-01 RX ADMIN — ATORVASTATIN CALCIUM 20 MG: 20 TABLET, FILM COATED ORAL at 08:10

## 2022-01-01 RX ADMIN — AMLODIPINE BESYLATE 10 MG: 10 TABLET ORAL at 09:06

## 2022-01-01 RX ADMIN — POTASSIUM CHLORIDE 80 MEQ: 1500 TABLET, EXTENDED RELEASE ORAL at 08:44

## 2022-01-01 RX ADMIN — HYDRALAZINE HYDROCHLORIDE 100 MG: 100 TABLET, FILM COATED ORAL at 19:23

## 2022-01-01 RX ADMIN — Medication 1 TABLET: at 08:55

## 2022-01-01 RX ADMIN — CEFADROXIL 500 MG: 500 CAPSULE ORAL at 21:00

## 2022-01-01 RX ADMIN — INSULIN ASPART 1 UNITS: 100 INJECTION, SOLUTION INTRAVENOUS; SUBCUTANEOUS at 11:32

## 2022-01-01 RX ADMIN — POTASSIUM CHLORIDE 40 MEQ: 750 TABLET, EXTENDED RELEASE ORAL at 09:02

## 2022-01-01 RX ADMIN — HYDRALAZINE HYDROCHLORIDE 100 MG: 100 TABLET, FILM COATED ORAL at 14:54

## 2022-01-01 RX ADMIN — POTASSIUM CHLORIDE 40 MEQ: 750 TABLET, EXTENDED RELEASE ORAL at 09:05

## 2022-01-01 RX ADMIN — POTASSIUM CHLORIDE 60 MEQ: 1500 TABLET, EXTENDED RELEASE ORAL at 06:32

## 2022-01-01 RX ADMIN — HYDRALAZINE HYDROCHLORIDE 100 MG: 100 TABLET, FILM COATED ORAL at 08:47

## 2022-01-01 RX ADMIN — FINASTERIDE 5 MG: 5 TABLET, FILM COATED ORAL at 08:37

## 2022-01-01 RX ADMIN — NOREPINEPHRINE BITARTRATE 6.4 MCG: 1 INJECTION, SOLUTION, CONCENTRATE INTRAVENOUS at 09:05

## 2022-01-01 RX ADMIN — BUMETANIDE 2 MG/HR: 0.25 INJECTION INTRAMUSCULAR; INTRAVENOUS at 17:07

## 2022-01-01 RX ADMIN — ISOSORBIDE MONONITRATE 90 MG: 30 TABLET, EXTENDED RELEASE ORAL at 07:57

## 2022-01-01 RX ADMIN — FINASTERIDE 5 MG: 5 TABLET, FILM COATED ORAL at 08:47

## 2022-01-01 RX ADMIN — HYDRALAZINE HYDROCHLORIDE 100 MG: 100 TABLET, FILM COATED ORAL at 20:16

## 2022-01-01 RX ADMIN — HYDRALAZINE HYDROCHLORIDE 100 MG: 100 TABLET, FILM COATED ORAL at 14:10

## 2022-01-01 RX ADMIN — DAPAGLIFLOZIN 5 MG: 5 TABLET, FILM COATED ORAL at 08:43

## 2022-01-01 RX ADMIN — CEFADROXIL 500 MG: 500 CAPSULE ORAL at 20:38

## 2022-01-01 RX ADMIN — CHLOROTHIAZIDE SODIUM 500 MG: 500 INJECTION, POWDER, LYOPHILIZED, FOR SOLUTION INTRAVENOUS at 11:06

## 2022-01-01 RX ADMIN — POTASSIUM CHLORIDE 80 MEQ: 40 SOLUTION ORAL at 20:45

## 2022-01-01 RX ADMIN — POTASSIUM CHLORIDE 80 MEQ: 1500 TABLET, EXTENDED RELEASE ORAL at 20:38

## 2022-01-01 RX ADMIN — MAGNESIUM OXIDE TAB 400 MG (241.3 MG ELEMENTAL MG) 400 MG: 400 (241.3 MG) TAB at 00:59

## 2022-01-01 RX ADMIN — BUMETANIDE 5 MG: 1 TABLET ORAL at 08:37

## 2022-01-01 RX ADMIN — ISOSORBIDE MONONITRATE 90 MG: 60 TABLET, EXTENDED RELEASE ORAL at 09:06

## 2022-01-01 RX ADMIN — HYDRALAZINE HYDROCHLORIDE 100 MG: 100 TABLET, FILM COATED ORAL at 08:38

## 2022-01-01 RX ADMIN — HYDRALAZINE HYDROCHLORIDE 100 MG: 100 TABLET, FILM COATED ORAL at 21:00

## 2022-01-01 RX ADMIN — BUMETANIDE 5 MG: 1 TABLET ORAL at 21:38

## 2022-01-01 RX ADMIN — DAPAGLIFLOZIN 5 MG: 5 TABLET, FILM COATED ORAL at 09:04

## 2022-01-01 RX ADMIN — POTASSIUM CHLORIDE 60 MEQ: 40 SOLUTION ORAL at 08:03

## 2022-01-01 RX ADMIN — PANTOPRAZOLE SODIUM 40 MG: 40 TABLET, DELAYED RELEASE ORAL at 09:23

## 2022-01-01 RX ADMIN — ZOLPIDEM TARTRATE 5 MG: 5 TABLET ORAL at 23:00

## 2022-01-01 RX ADMIN — AMIODARONE HYDROCHLORIDE 200 MG: 200 TABLET ORAL at 08:13

## 2022-01-01 RX ADMIN — PANTOPRAZOLE SODIUM 40 MG: 40 TABLET, DELAYED RELEASE ORAL at 09:27

## 2022-01-01 RX ADMIN — CHLOROTHIAZIDE SODIUM 500 MG: 500 INJECTION, POWDER, LYOPHILIZED, FOR SOLUTION INTRAVENOUS at 10:20

## 2022-01-01 RX ADMIN — FINASTERIDE 5 MG: 5 TABLET, FILM COATED ORAL at 08:45

## 2022-01-01 RX ADMIN — ATORVASTATIN CALCIUM 20 MG: 20 TABLET, FILM COATED ORAL at 09:54

## 2022-01-01 RX ADMIN — HYDRALAZINE HYDROCHLORIDE 100 MG: 100 TABLET, FILM COATED ORAL at 08:08

## 2022-01-01 RX ADMIN — Medication 1 CAPSULE: at 09:08

## 2022-01-01 RX ADMIN — SODIUM CHLORIDE, POTASSIUM CHLORIDE, SODIUM LACTATE AND CALCIUM CHLORIDE: 600; 310; 30; 20 INJECTION, SOLUTION INTRAVENOUS at 08:34

## 2022-01-01 RX ADMIN — FINASTERIDE 5 MG: 5 TABLET, FILM COATED ORAL at 09:18

## 2022-01-01 RX ADMIN — FINASTERIDE 5 MG: 5 TABLET, FILM COATED ORAL at 09:12

## 2022-01-01 RX ADMIN — POTASSIUM CHLORIDE 40 MEQ: 40 SOLUTION ORAL at 09:26

## 2022-01-01 RX ADMIN — TAMSULOSIN HYDROCHLORIDE 0.8 MG: 0.4 CAPSULE ORAL at 08:45

## 2022-01-01 RX ADMIN — Medication 0.5 MG/HR: at 14:04

## 2022-01-01 RX ADMIN — LEVOTHYROXINE SODIUM 75 MCG: 0.07 TABLET ORAL at 09:54

## 2022-01-01 RX ADMIN — ZOLPIDEM TARTRATE 5 MG: 5 TABLET ORAL at 21:39

## 2022-01-01 RX ADMIN — AMIODARONE HYDROCHLORIDE 200 MG: 200 TABLET ORAL at 08:47

## 2022-01-01 RX ADMIN — INSULIN ASPART 1 UNITS: 100 INJECTION, SOLUTION INTRAVENOUS; SUBCUTANEOUS at 17:56

## 2022-01-01 RX ADMIN — MAGNESIUM OXIDE TAB 400 MG (241.3 MG ELEMENTAL MG) 400 MG: 400 (241.3 MG) TAB at 15:01

## 2022-01-01 RX ADMIN — BUMETANIDE 2 MG/HR: 0.25 INJECTION INTRAMUSCULAR; INTRAVENOUS at 17:01

## 2022-01-01 RX ADMIN — LEVOTHYROXINE SODIUM 75 MCG: 0.07 TABLET ORAL at 09:27

## 2022-01-01 RX ADMIN — CEFAZOLIN SODIUM 2 G: 2 INJECTION, SOLUTION INTRAVENOUS at 21:46

## 2022-01-01 RX ADMIN — Medication 400 MG: at 15:40

## 2022-01-01 RX ADMIN — CHLOROTHIAZIDE SODIUM 1000 MG: 500 INJECTION, POWDER, LYOPHILIZED, FOR SOLUTION INTRAVENOUS at 14:05

## 2022-01-01 RX ADMIN — POTASSIUM CHLORIDE 80 MEQ: 40 SOLUTION ORAL at 12:34

## 2022-01-01 RX ADMIN — FINASTERIDE 5 MG: 5 TABLET, FILM COATED ORAL at 09:27

## 2022-01-01 RX ADMIN — HYDRALAZINE HYDROCHLORIDE 100 MG: 100 TABLET, FILM COATED ORAL at 07:58

## 2022-01-01 RX ADMIN — ISOSORBIDE MONONITRATE 90 MG: 60 TABLET, EXTENDED RELEASE ORAL at 08:12

## 2022-01-01 RX ADMIN — MAGNESIUM OXIDE TAB 400 MG (241.3 MG ELEMENTAL MG) 400 MG: 400 (241.3 MG) TAB at 08:52

## 2022-01-01 RX ADMIN — HYDRALAZINE HYDROCHLORIDE 100 MG: 100 TABLET, FILM COATED ORAL at 14:47

## 2022-01-01 RX ADMIN — CEFADROXIL 500 MG: 500 CAPSULE ORAL at 20:45

## 2022-01-01 RX ADMIN — FINASTERIDE 5 MG: 5 TABLET, FILM COATED ORAL at 08:52

## 2022-01-01 RX ADMIN — ASPIRIN 81 MG: 81 TABLET, COATED ORAL at 09:07

## 2022-01-01 RX ADMIN — HYDRALAZINE HYDROCHLORIDE 100 MG: 100 TABLET, FILM COATED ORAL at 09:26

## 2022-01-01 RX ADMIN — BUMETANIDE 5 MG: 1 TABLET ORAL at 15:47

## 2022-01-01 RX ADMIN — BUMETANIDE 2 MG/HR: 0.25 INJECTION INTRAMUSCULAR; INTRAVENOUS at 04:16

## 2022-01-01 RX ADMIN — ATORVASTATIN CALCIUM 20 MG: 20 TABLET, FILM COATED ORAL at 09:24

## 2022-01-01 RX ADMIN — ISOSORBIDE MONONITRATE 90 MG: 30 TABLET, EXTENDED RELEASE ORAL at 09:05

## 2022-01-01 RX ADMIN — MAGNESIUM OXIDE TAB 400 MG (241.3 MG ELEMENTAL MG) 400 MG: 400 (241.3 MG) TAB at 21:44

## 2022-01-01 RX ADMIN — POTASSIUM CHLORIDE 80 MEQ: 40 SOLUTION ORAL at 08:46

## 2022-01-01 RX ADMIN — FLUOXETINE 30 MG: 20 CAPSULE ORAL at 09:24

## 2022-01-01 RX ADMIN — FLUOXETINE 30 MG: 20 CAPSULE ORAL at 09:06

## 2022-01-01 RX ADMIN — AMLODIPINE BESYLATE 10 MG: 10 TABLET ORAL at 09:54

## 2022-01-01 RX ADMIN — POTASSIUM CHLORIDE 80 MEQ: 1500 TABLET, EXTENDED RELEASE ORAL at 20:15

## 2022-01-01 RX ADMIN — HYDRALAZINE HYDROCHLORIDE 100 MG: 100 TABLET, FILM COATED ORAL at 20:12

## 2022-01-01 RX ADMIN — POTASSIUM CHLORIDE 60 MEQ: 1500 TABLET, EXTENDED RELEASE ORAL at 17:06

## 2022-01-01 RX ADMIN — METHOCARBAMOL 500 MG: 500 TABLET, FILM COATED ORAL at 22:41

## 2022-01-01 RX ADMIN — HYDRALAZINE HYDROCHLORIDE 100 MG: 100 TABLET, FILM COATED ORAL at 19:58

## 2022-01-01 RX ADMIN — ZOLPIDEM TARTRATE 5 MG: 5 TABLET ORAL at 21:44

## 2022-01-01 RX ADMIN — POTASSIUM CHLORIDE 80 MEQ: 1500 TABLET, EXTENDED RELEASE ORAL at 20:13

## 2022-01-01 RX ADMIN — PIPERACILLIN SODIUM AND TAZOBACTAM SODIUM 4.5 G: 4; .5 INJECTION, POWDER, LYOPHILIZED, FOR SOLUTION INTRAVENOUS at 01:43

## 2022-01-01 RX ADMIN — Medication 1 CAPSULE: at 09:04

## 2022-01-01 RX ADMIN — MAGNESIUM OXIDE TAB 400 MG (241.3 MG ELEMENTAL MG) 400 MG: 400 (241.3 MG) TAB at 06:56

## 2022-01-01 RX ADMIN — POTASSIUM CHLORIDE 100 MEQ: 20 SOLUTION ORAL at 13:19

## 2022-01-01 RX ADMIN — CEFADROXIL 500 MG: 500 CAPSULE ORAL at 08:09

## 2022-01-01 RX ADMIN — MAGNESIUM OXIDE TAB 400 MG (241.3 MG ELEMENTAL MG) 400 MG: 400 (241.3 MG) TAB at 11:39

## 2022-01-01 RX ADMIN — CEFADROXIL 500 MG: 500 CAPSULE ORAL at 07:55

## 2022-01-01 RX ADMIN — HYDRALAZINE HYDROCHLORIDE 100 MG: 100 TABLET, FILM COATED ORAL at 09:05

## 2022-01-01 RX ADMIN — WARFARIN SODIUM 3 MG: 3 TABLET ORAL at 17:20

## 2022-01-01 RX ADMIN — Medication 1 HALF-TAB: at 14:54

## 2022-01-01 RX ADMIN — MAGNESIUM OXIDE TAB 400 MG (241.3 MG ELEMENTAL MG) 400 MG: 400 (241.3 MG) TAB at 11:29

## 2022-01-01 RX ADMIN — Medication 1 CAPSULE: at 07:54

## 2022-01-01 RX ADMIN — HYDRALAZINE HYDROCHLORIDE 100 MG: 100 TABLET, FILM COATED ORAL at 20:08

## 2022-01-01 RX ADMIN — CEFAZOLIN SODIUM 2 G: 2 INJECTION, SOLUTION INTRAVENOUS at 03:41

## 2022-01-01 RX ADMIN — POTASSIUM CHLORIDE 80 MEQ: 1500 TABLET, EXTENDED RELEASE ORAL at 09:25

## 2022-01-01 RX ADMIN — ZOLPIDEM TARTRATE 5 MG: 5 TABLET ORAL at 21:19

## 2022-01-01 RX ADMIN — LEVOTHYROXINE SODIUM 75 MCG: 0.07 TABLET ORAL at 08:52

## 2022-01-01 RX ADMIN — ATORVASTATIN CALCIUM 20 MG: 20 TABLET, FILM COATED ORAL at 08:37

## 2022-01-01 RX ADMIN — PANTOPRAZOLE SODIUM 40 MG: 40 TABLET, DELAYED RELEASE ORAL at 08:10

## 2022-01-01 RX ADMIN — ASPIRIN 81 MG: 81 TABLET, CHEWABLE ORAL at 08:54

## 2022-01-01 RX ADMIN — Medication 400 MG: at 10:29

## 2022-01-01 RX ADMIN — CEFADROXIL 500 MG: 500 CAPSULE ORAL at 20:49

## 2022-01-01 RX ADMIN — WARFARIN SODIUM 3 MG: 3 TABLET ORAL at 18:04

## 2022-01-01 RX ADMIN — Medication 400 MG: at 06:31

## 2022-01-01 RX ADMIN — PANTOPRAZOLE SODIUM 40 MG: 40 TABLET, DELAYED RELEASE ORAL at 06:55

## 2022-01-01 RX ADMIN — POTASSIUM CHLORIDE 40 MEQ: 1500 TABLET, EXTENDED RELEASE ORAL at 12:10

## 2022-01-01 RX ADMIN — POTASSIUM CHLORIDE 40 MEQ: 1500 TABLET, EXTENDED RELEASE ORAL at 13:17

## 2022-01-01 RX ADMIN — HYDRALAZINE HYDROCHLORIDE 100 MG: 100 TABLET, FILM COATED ORAL at 14:12

## 2022-01-01 RX ADMIN — POTASSIUM CHLORIDE 60 MEQ: 1500 TABLET, EXTENDED RELEASE ORAL at 20:38

## 2022-01-01 RX ADMIN — POTASSIUM CHLORIDE 40 MEQ: 20 SOLUTION ORAL at 16:10

## 2022-01-01 RX ADMIN — ASPIRIN 81 MG: 81 TABLET, COATED ORAL at 08:37

## 2022-01-01 RX ADMIN — DAPTOMYCIN 500 MG: 500 INJECTION, POWDER, LYOPHILIZED, FOR SOLUTION INTRAVENOUS at 09:56

## 2022-01-01 RX ADMIN — HYDRALAZINE HYDROCHLORIDE 100 MG: 100 TABLET, FILM COATED ORAL at 07:55

## 2022-01-01 RX ADMIN — POTASSIUM CHLORIDE 40 MEQ: 1500 TABLET, EXTENDED RELEASE ORAL at 11:38

## 2022-01-01 RX ADMIN — BUMETANIDE 2 MG/HR: 0.25 INJECTION INTRAMUSCULAR; INTRAVENOUS at 09:51

## 2022-01-01 RX ADMIN — CEFADROXIL 500 MG: 500 CAPSULE ORAL at 09:53

## 2022-01-01 RX ADMIN — FLUOXETINE 30 MG: 20 CAPSULE ORAL at 08:46

## 2022-01-01 RX ADMIN — MAGNESIUM OXIDE TAB 400 MG (241.3 MG ELEMENTAL MG) 400 MG: 400 (241.3 MG) TAB at 21:31

## 2022-01-01 RX ADMIN — ASPIRIN 81 MG: 81 TABLET, COATED ORAL at 09:03

## 2022-01-01 RX ADMIN — AMIODARONE HYDROCHLORIDE 200 MG: 200 TABLET ORAL at 07:58

## 2022-01-01 RX ADMIN — METHOCARBAMOL 500 MG: 500 TABLET ORAL at 23:00

## 2022-01-01 RX ADMIN — ISOSORBIDE MONONITRATE 90 MG: 30 TABLET, EXTENDED RELEASE ORAL at 08:45

## 2022-01-01 RX ADMIN — AMIODARONE HYDROCHLORIDE 200 MG: 200 TABLET ORAL at 09:06

## 2022-01-01 RX ADMIN — AMLODIPINE BESYLATE 10 MG: 10 TABLET ORAL at 08:09

## 2022-01-01 RX ADMIN — AMIODARONE HYDROCHLORIDE 200 MG: 200 TABLET ORAL at 09:08

## 2022-01-01 RX ADMIN — CHLOROTHIAZIDE SODIUM 1000 MG: 500 INJECTION, POWDER, LYOPHILIZED, FOR SOLUTION INTRAVENOUS at 16:35

## 2022-01-01 RX ADMIN — ASPIRIN 81 MG: 81 TABLET, CHEWABLE ORAL at 07:56

## 2022-01-01 RX ADMIN — Medication 1 CAPSULE: at 08:47

## 2022-01-01 RX ADMIN — MAGNESIUM OXIDE TAB 400 MG (241.3 MG ELEMENTAL MG) 400 MG: 400 (241.3 MG) TAB at 09:29

## 2022-01-01 RX ADMIN — SENNOSIDES AND DOCUSATE SODIUM 1 TABLET: 8.6; 5 TABLET ORAL at 20:38

## 2022-01-01 RX ADMIN — CEFADROXIL 500 MG: 500 CAPSULE ORAL at 21:08

## 2022-01-01 RX ADMIN — Medication 400 MG: at 19:23

## 2022-01-01 RX ADMIN — DAPAGLIFLOZIN 5 MG: 5 TABLET, FILM COATED ORAL at 09:11

## 2022-01-01 RX ADMIN — HYDRALAZINE HYDROCHLORIDE 100 MG: 100 TABLET, FILM COATED ORAL at 08:46

## 2022-01-01 RX ADMIN — AMLODIPINE BESYLATE 10 MG: 10 TABLET ORAL at 07:58

## 2022-01-01 RX ADMIN — MAGNESIUM OXIDE TAB 400 MG (241.3 MG ELEMENTAL MG) 400 MG: 400 (241.3 MG) TAB at 09:13

## 2022-01-01 RX ADMIN — HYDRALAZINE HYDROCHLORIDE 100 MG: 100 TABLET, FILM COATED ORAL at 08:53

## 2022-01-01 RX ADMIN — ASPIRIN 81 MG: 81 TABLET, COATED ORAL at 08:06

## 2022-01-01 RX ADMIN — MAGNESIUM OXIDE TAB 400 MG (241.3 MG ELEMENTAL MG) 400 MG: 400 (241.3 MG) TAB at 09:25

## 2022-01-01 RX ADMIN — AMIODARONE HYDROCHLORIDE 200 MG: 200 TABLET ORAL at 09:03

## 2022-01-01 RX ADMIN — HYDRALAZINE HYDROCHLORIDE 100 MG: 100 TABLET, FILM COATED ORAL at 14:05

## 2022-01-01 RX ADMIN — Medication 400 MG: at 14:28

## 2022-01-01 RX ADMIN — MAGNESIUM OXIDE TAB 400 MG (241.3 MG ELEMENTAL MG) 400 MG: 400 (241.3 MG) TAB at 18:40

## 2022-01-01 RX ADMIN — ISOSORBIDE MONONITRATE 90 MG: 60 TABLET, EXTENDED RELEASE ORAL at 08:36

## 2022-01-01 RX ADMIN — FLUOXETINE 30 MG: 20 CAPSULE ORAL at 08:09

## 2022-01-01 RX ADMIN — Medication 400 MG: at 12:37

## 2022-01-01 RX ADMIN — BUMETANIDE 4 MG: 2 TABLET ORAL at 09:27

## 2022-01-01 RX ADMIN — LEVOTHYROXINE SODIUM 75 MCG: 0.07 TABLET ORAL at 08:09

## 2022-01-01 RX ADMIN — POTASSIUM CHLORIDE 80 MEQ: 1500 TABLET, EXTENDED RELEASE ORAL at 09:06

## 2022-01-01 RX ADMIN — BUMETANIDE 4 MG: 0.25 INJECTION INTRAMUSCULAR; INTRAVENOUS at 17:10

## 2022-01-01 RX ADMIN — BUMETANIDE 2 MG/HR: 0.25 INJECTION INTRAMUSCULAR; INTRAVENOUS at 23:25

## 2022-01-01 RX ADMIN — WARFARIN SODIUM 2 MG: 1 TABLET ORAL at 17:10

## 2022-01-01 RX ADMIN — TAMSULOSIN HYDROCHLORIDE 0.8 MG: 0.4 CAPSULE ORAL at 09:22

## 2022-01-01 RX ADMIN — ZOLPIDEM TARTRATE 5 MG: 5 TABLET ORAL at 21:07

## 2022-01-01 RX ADMIN — ALLOPURINOL 200 MG: 100 TABLET ORAL at 08:13

## 2022-01-01 RX ADMIN — BUMETANIDE 5 MG: 1 TABLET ORAL at 09:12

## 2022-01-01 RX ADMIN — FLUOXETINE 30 MG: 20 CAPSULE ORAL at 09:54

## 2022-01-01 RX ADMIN — Medication 1 CAPSULE: at 08:09

## 2022-01-01 RX ADMIN — BUMETANIDE 2 MG/HR: 0.25 INJECTION INTRAMUSCULAR; INTRAVENOUS at 17:49

## 2022-01-01 RX ADMIN — FLUOXETINE 30 MG: 20 CAPSULE ORAL at 08:53

## 2022-01-01 RX ADMIN — ZOLPIDEM TARTRATE 5 MG: 5 TABLET ORAL at 21:51

## 2022-01-01 RX ADMIN — TAMSULOSIN HYDROCHLORIDE 0.8 MG: 0.4 CAPSULE ORAL at 09:17

## 2022-01-01 RX ADMIN — ATORVASTATIN CALCIUM 20 MG: 20 TABLET, FILM COATED ORAL at 08:45

## 2022-01-01 RX ADMIN — MAGNESIUM OXIDE TAB 400 MG (241.3 MG ELEMENTAL MG) 400 MG: 400 (241.3 MG) TAB at 23:29

## 2022-01-01 RX ADMIN — POTASSIUM CHLORIDE 20 MEQ: 750 TABLET, EXTENDED RELEASE ORAL at 23:31

## 2022-01-01 RX ADMIN — AMLODIPINE BESYLATE 10 MG: 10 TABLET ORAL at 09:16

## 2022-01-01 RX ADMIN — Medication 1 TABLET: at 06:55

## 2022-01-01 RX ADMIN — AMIODARONE HYDROCHLORIDE 200 MG: 200 TABLET ORAL at 09:22

## 2022-01-01 RX ADMIN — MAGNESIUM OXIDE TAB 400 MG (241.3 MG ELEMENTAL MG) 400 MG: 400 (241.3 MG) TAB at 19:58

## 2022-01-01 RX ADMIN — FLUOXETINE 30 MG: 20 CAPSULE ORAL at 07:57

## 2022-01-01 RX ADMIN — ONDANSETRON 4 MG: 2 INJECTION INTRAMUSCULAR; INTRAVENOUS at 09:26

## 2022-01-01 RX ADMIN — PANTOPRAZOLE SODIUM 40 MG: 40 TABLET, DELAYED RELEASE ORAL at 07:00

## 2022-01-01 RX ADMIN — MAGNESIUM OXIDE TAB 400 MG (241.3 MG ELEMENTAL MG) 400 MG: 400 (241.3 MG) TAB at 17:56

## 2022-01-01 RX ADMIN — POTASSIUM CHLORIDE 80 MEQ: 40 SOLUTION ORAL at 08:11

## 2022-01-01 RX ADMIN — Medication 1 TABLET: at 08:52

## 2022-01-01 RX ADMIN — ACETAZOLAMIDE 500 MG: 500 INJECTION, POWDER, LYOPHILIZED, FOR SOLUTION INTRAVENOUS at 15:10

## 2022-01-01 RX ADMIN — PIPERACILLIN SODIUM AND TAZOBACTAM SODIUM 4.5 G: 4; .5 INJECTION, POWDER, LYOPHILIZED, FOR SOLUTION INTRAVENOUS at 19:49

## 2022-01-01 RX ADMIN — PANTOPRAZOLE SODIUM 40 MG: 40 TABLET, DELAYED RELEASE ORAL at 08:53

## 2022-01-01 RX ADMIN — POTASSIUM CHLORIDE 40 MEQ: 1500 TABLET, EXTENDED RELEASE ORAL at 12:31

## 2022-01-01 RX ADMIN — MAGNESIUM OXIDE TAB 400 MG (241.3 MG ELEMENTAL MG) 400 MG: 400 (241.3 MG) TAB at 08:09

## 2022-01-01 RX ADMIN — MAGNESIUM OXIDE TAB 400 MG (241.3 MG ELEMENTAL MG) 400 MG: 400 (241.3 MG) TAB at 08:39

## 2022-01-01 RX ADMIN — AMIODARONE HYDROCHLORIDE 200 MG: 200 TABLET ORAL at 08:38

## 2022-01-01 RX ADMIN — Medication 400 MG: at 11:09

## 2022-01-01 RX ADMIN — POTASSIUM CHLORIDE 80 MEQ: 1500 TABLET, EXTENDED RELEASE ORAL at 09:20

## 2022-01-01 RX ADMIN — AMIODARONE HYDROCHLORIDE 200 MG: 200 TABLET ORAL at 08:53

## 2022-01-01 RX ADMIN — HYDRALAZINE HYDROCHLORIDE 100 MG: 100 TABLET, FILM COATED ORAL at 19:42

## 2022-01-01 RX ADMIN — FENTANYL CITRATE 100 MCG: 50 INJECTION, SOLUTION INTRAMUSCULAR; INTRAVENOUS at 08:42

## 2022-01-01 RX ADMIN — NOREPINEPHRINE BITARTRATE 6.4 MCG: 1 INJECTION, SOLUTION, CONCENTRATE INTRAVENOUS at 08:55

## 2022-01-01 RX ADMIN — PANTOPRAZOLE SODIUM 40 MG: 40 TABLET, DELAYED RELEASE ORAL at 09:53

## 2022-01-01 RX ADMIN — POTASSIUM CHLORIDE 40 MEQ: 750 TABLET, EXTENDED RELEASE ORAL at 09:08

## 2022-01-01 RX ADMIN — Medication 1 CAPSULE: at 08:12

## 2022-01-01 RX ADMIN — POTASSIUM CHLORIDE 80 MEQ: 1500 TABLET, EXTENDED RELEASE ORAL at 20:12

## 2022-01-01 RX ADMIN — CEFADROXIL 500 MG: 500 CAPSULE ORAL at 20:40

## 2022-01-01 RX ADMIN — ATORVASTATIN CALCIUM 20 MG: 20 TABLET, FILM COATED ORAL at 07:54

## 2022-01-01 RX ADMIN — PANTOPRAZOLE SODIUM 40 MG: 40 TABLET, DELAYED RELEASE ORAL at 09:17

## 2022-01-01 RX ADMIN — BUMETANIDE 2 MG/HR: 0.25 INJECTION INTRAMUSCULAR; INTRAVENOUS at 18:00

## 2022-01-01 RX ADMIN — WARFARIN SODIUM 4 MG: 4 TABLET ORAL at 18:01

## 2022-01-01 RX ADMIN — BUMETANIDE 5 MG: 1 TABLET ORAL at 09:17

## 2022-01-01 RX ADMIN — AMLODIPINE BESYLATE 10 MG: 10 TABLET ORAL at 08:54

## 2022-01-01 RX ADMIN — PANTOPRAZOLE SODIUM 40 MG: 40 TABLET, DELAYED RELEASE ORAL at 08:54

## 2022-01-01 RX ADMIN — LEVOTHYROXINE SODIUM 75 MCG: 0.07 TABLET ORAL at 08:45

## 2022-01-01 RX ADMIN — TAMSULOSIN HYDROCHLORIDE 0.8 MG: 0.4 CAPSULE ORAL at 08:37

## 2022-01-01 RX ADMIN — FINASTERIDE 5 MG: 5 TABLET, FILM COATED ORAL at 08:54

## 2022-01-01 RX ADMIN — CHLOROTHIAZIDE SODIUM 1000 MG: 500 INJECTION, POWDER, LYOPHILIZED, FOR SOLUTION INTRAVENOUS at 13:17

## 2022-01-01 RX ADMIN — POTASSIUM CHLORIDE 40 MEQ: 750 TABLET, EXTENDED RELEASE ORAL at 08:04

## 2022-01-01 RX ADMIN — Medication 1 HALF-TAB: at 21:07

## 2022-01-01 RX ADMIN — ASPIRIN 81 MG: 81 TABLET, COATED ORAL at 09:52

## 2022-01-01 RX ADMIN — BUMETANIDE 2 MG/HR: 0.25 INJECTION INTRAMUSCULAR; INTRAVENOUS at 06:24

## 2022-01-01 RX ADMIN — ISOSORBIDE MONONITRATE 90 MG: 60 TABLET, EXTENDED RELEASE ORAL at 09:52

## 2022-01-01 RX ADMIN — POTASSIUM CHLORIDE 60 MEQ: 40 SOLUTION ORAL at 10:21

## 2022-01-01 RX ADMIN — POTASSIUM CHLORIDE 60 MEQ: 40 SOLUTION ORAL at 14:47

## 2022-01-01 RX ADMIN — ISOSORBIDE MONONITRATE 90 MG: 30 TABLET, EXTENDED RELEASE ORAL at 06:52

## 2022-01-01 RX ADMIN — POTASSIUM CHLORIDE 20 MEQ: 750 TABLET, EXTENDED RELEASE ORAL at 15:01

## 2022-01-01 RX ADMIN — POTASSIUM CHLORIDE 100 MEQ: 20 SOLUTION ORAL at 14:06

## 2022-01-01 RX ADMIN — PANTOPRAZOLE SODIUM 40 MG: 40 TABLET, DELAYED RELEASE ORAL at 06:46

## 2022-01-01 RX ADMIN — FINASTERIDE 5 MG: 5 TABLET, FILM COATED ORAL at 08:10

## 2022-01-01 RX ADMIN — POTASSIUM CHLORIDE 40 MEQ: 750 TABLET, EXTENDED RELEASE ORAL at 10:29

## 2022-01-01 RX ADMIN — HYDRALAZINE HYDROCHLORIDE 100 MG: 100 TABLET, FILM COATED ORAL at 14:59

## 2022-01-01 RX ADMIN — TAMSULOSIN HYDROCHLORIDE 0.8 MG: 0.4 CAPSULE ORAL at 08:07

## 2022-01-01 RX ADMIN — SENNOSIDES AND DOCUSATE SODIUM 1 TABLET: 8.6; 5 TABLET ORAL at 08:10

## 2022-01-01 RX ADMIN — DAPAGLIFLOZIN 5 MG: 5 TABLET, FILM COATED ORAL at 09:19

## 2022-01-01 RX ADMIN — LEVOTHYROXINE SODIUM 75 MCG: 0.07 TABLET ORAL at 08:38

## 2022-01-01 RX ADMIN — FINASTERIDE 5 MG: 5 TABLET, FILM COATED ORAL at 07:54

## 2022-01-01 RX ADMIN — CEFAZOLIN SODIUM 2 G: 2 INJECTION, SOLUTION INTRAVENOUS at 01:26

## 2022-01-01 RX ADMIN — AMLODIPINE BESYLATE 10 MG: 10 TABLET ORAL at 07:55

## 2022-01-01 RX ADMIN — ATORVASTATIN CALCIUM 20 MG: 20 TABLET, FILM COATED ORAL at 09:05

## 2022-01-01 RX ADMIN — PANTOPRAZOLE SODIUM 40 MG: 40 TABLET, DELAYED RELEASE ORAL at 08:07

## 2022-01-01 RX ADMIN — ALLOPURINOL 200 MG: 100 TABLET ORAL at 08:38

## 2022-01-01 RX ADMIN — AMIODARONE HYDROCHLORIDE 200 MG: 200 TABLET ORAL at 08:09

## 2022-01-01 RX ADMIN — BUMETANIDE 3 MG: 0.25 INJECTION, SOLUTION INTRAMUSCULAR; INTRAVENOUS at 17:02

## 2022-01-01 RX ADMIN — MAGNESIUM OXIDE TAB 400 MG (241.3 MG ELEMENTAL MG) 400 MG: 400 (241.3 MG) TAB at 20:27

## 2022-01-01 RX ADMIN — CEFADROXIL 500 MG: 500 CAPSULE ORAL at 08:46

## 2022-01-01 RX ADMIN — POTASSIUM CHLORIDE 60 MEQ: 1500 TABLET, EXTENDED RELEASE ORAL at 14:30

## 2022-01-01 RX ADMIN — HYDRALAZINE HYDROCHLORIDE 100 MG: 100 TABLET, FILM COATED ORAL at 08:12

## 2022-01-01 RX ADMIN — FINASTERIDE 5 MG: 5 TABLET, FILM COATED ORAL at 09:08

## 2022-01-01 RX ADMIN — ISOSORBIDE MONONITRATE 90 MG: 60 TABLET, EXTENDED RELEASE ORAL at 08:47

## 2022-01-01 RX ADMIN — LEVOTHYROXINE SODIUM 75 MCG: 0.07 TABLET ORAL at 08:10

## 2022-01-01 RX ADMIN — PANTOPRAZOLE SODIUM 40 MG: 40 TABLET, DELAYED RELEASE ORAL at 08:47

## 2022-01-01 RX ADMIN — HYDRALAZINE HYDROCHLORIDE 100 MG: 100 TABLET, FILM COATED ORAL at 12:50

## 2022-01-01 RX ADMIN — PANTOPRAZOLE SODIUM 40 MG: 40 TABLET, DELAYED RELEASE ORAL at 08:13

## 2022-01-01 RX ADMIN — AMLODIPINE BESYLATE 10 MG: 10 TABLET ORAL at 09:27

## 2022-01-01 RX ADMIN — Medication 400 MG: at 16:07

## 2022-01-01 RX ADMIN — FLUOXETINE 30 MG: 20 CAPSULE ORAL at 07:54

## 2022-01-01 RX ADMIN — PIPERACILLIN SODIUM AND TAZOBACTAM SODIUM 4.5 G: 4; .5 INJECTION, POWDER, LYOPHILIZED, FOR SOLUTION INTRAVENOUS at 12:22

## 2022-01-01 RX ADMIN — HYDRALAZINE HYDROCHLORIDE 100 MG: 100 TABLET, FILM COATED ORAL at 20:35

## 2022-01-01 RX ADMIN — BUMETANIDE 2 MG/HR: 0.25 INJECTION INTRAMUSCULAR; INTRAVENOUS at 03:20

## 2022-01-01 RX ADMIN — POTASSIUM CHLORIDE 20 MEQ: 1500 TABLET, EXTENDED RELEASE ORAL at 11:29

## 2022-01-01 RX ADMIN — CEFAZOLIN SODIUM 2 G: 2 INJECTION, SOLUTION INTRAVENOUS at 06:15

## 2022-01-01 RX ADMIN — TAMSULOSIN HYDROCHLORIDE 0.8 MG: 0.4 CAPSULE ORAL at 07:56

## 2022-01-01 RX ADMIN — Medication 1 TABLET: at 09:26

## 2022-01-01 RX ADMIN — ATORVASTATIN CALCIUM 20 MG: 20 TABLET, FILM COATED ORAL at 08:13

## 2022-01-01 RX ADMIN — MAGNESIUM OXIDE TAB 400 MG (241.3 MG ELEMENTAL MG) 400 MG: 400 (241.3 MG) TAB at 17:32

## 2022-01-01 RX ADMIN — ALLOPURINOL 200 MG: 100 TABLET ORAL at 09:07

## 2022-01-01 RX ADMIN — ATORVASTATIN CALCIUM 20 MG: 20 TABLET, FILM COATED ORAL at 09:18

## 2022-01-01 RX ADMIN — AMIODARONE HYDROCHLORIDE 200 MG: 200 TABLET ORAL at 09:18

## 2022-01-01 RX ADMIN — POTASSIUM CHLORIDE 80 MEQ: 1500 TABLET, EXTENDED RELEASE ORAL at 19:44

## 2022-01-01 RX ADMIN — FINASTERIDE 5 MG: 5 TABLET, FILM COATED ORAL at 08:13

## 2022-01-01 RX ADMIN — BUMETANIDE 0.5 MG/HR: 0.25 INJECTION INTRAMUSCULAR; INTRAVENOUS at 17:34

## 2022-01-01 RX ADMIN — ATORVASTATIN CALCIUM 20 MG: 20 TABLET, FILM COATED ORAL at 08:08

## 2022-01-01 RX ADMIN — AMLODIPINE BESYLATE 10 MG: 10 TABLET ORAL at 08:12

## 2022-01-01 RX ADMIN — HYDRALAZINE HYDROCHLORIDE 100 MG: 100 TABLET, FILM COATED ORAL at 09:20

## 2022-01-01 RX ADMIN — HYDRALAZINE HYDROCHLORIDE 100 MG: 100 TABLET, FILM COATED ORAL at 20:38

## 2022-01-01 RX ADMIN — AMIODARONE HYDROCHLORIDE 200 MG: 200 TABLET ORAL at 09:28

## 2022-01-01 RX ADMIN — FLUOXETINE 30 MG: 20 CAPSULE ORAL at 08:08

## 2022-01-01 RX ADMIN — CEFADROXIL 500 MG: 500 CAPSULE ORAL at 09:03

## 2022-01-01 RX ADMIN — Medication 1 TABLET: at 09:12

## 2022-01-01 RX ADMIN — BUMETANIDE 2 MG/HR: 0.25 INJECTION INTRAMUSCULAR; INTRAVENOUS at 03:50

## 2022-01-01 RX ADMIN — TAMSULOSIN HYDROCHLORIDE 0.8 MG: 0.4 CAPSULE ORAL at 08:12

## 2022-01-01 RX ADMIN — ISOSORBIDE MONONITRATE 90 MG: 30 TABLET, EXTENDED RELEASE ORAL at 08:37

## 2022-01-01 RX ADMIN — HYDRALAZINE HYDROCHLORIDE 100 MG: 100 TABLET, FILM COATED ORAL at 13:07

## 2022-01-01 RX ADMIN — DAPAGLIFLOZIN 5 MG: 5 TABLET, FILM COATED ORAL at 09:26

## 2022-01-01 RX ADMIN — LEVOTHYROXINE SODIUM 75 MCG: 0.07 TABLET ORAL at 09:04

## 2022-01-01 RX ADMIN — BUMETANIDE 5 MG: 1 TABLET ORAL at 16:17

## 2022-01-01 RX ADMIN — ALLOPURINOL 200 MG: 100 TABLET ORAL at 07:55

## 2022-01-01 RX ADMIN — MAGNESIUM OXIDE TAB 400 MG (241.3 MG ELEMENTAL MG) 400 MG: 400 (241.3 MG) TAB at 21:18

## 2022-01-01 RX ADMIN — ISOSORBIDE MONONITRATE 90 MG: 30 TABLET, EXTENDED RELEASE ORAL at 08:07

## 2022-01-01 RX ADMIN — Medication 1 CAPSULE: at 09:23

## 2022-01-01 RX ADMIN — POTASSIUM CHLORIDE 80 MEQ: 1500 TABLET, EXTENDED RELEASE ORAL at 21:42

## 2022-01-01 RX ADMIN — HYDRALAZINE HYDROCHLORIDE 100 MG: 100 TABLET, FILM COATED ORAL at 13:19

## 2022-01-01 RX ADMIN — POTASSIUM CHLORIDE 20 MEQ: 750 TABLET, EXTENDED RELEASE ORAL at 15:40

## 2022-01-01 RX ADMIN — TAMSULOSIN HYDROCHLORIDE 0.8 MG: 0.4 CAPSULE ORAL at 09:53

## 2022-01-01 RX ADMIN — BUMETANIDE 2 MG/HR: 0.25 INJECTION INTRAMUSCULAR; INTRAVENOUS at 05:05

## 2022-01-01 RX ADMIN — CEFAZOLIN SODIUM 2 G: 2 INJECTION, SOLUTION INTRAVENOUS at 20:20

## 2022-01-01 RX ADMIN — Medication 0.5 MG/HR: at 19:17

## 2022-01-01 RX ADMIN — BUMETANIDE 5 MG: 1 TABLET ORAL at 09:05

## 2022-01-01 RX ADMIN — CHLOROTHIAZIDE SODIUM 500 MG: 500 INJECTION, POWDER, LYOPHILIZED, FOR SOLUTION INTRAVENOUS at 19:38

## 2022-01-01 RX ADMIN — POTASSIUM CHLORIDE 40 MEQ: 750 TABLET, EXTENDED RELEASE ORAL at 12:16

## 2022-01-01 RX ADMIN — Medication 1 TABLET: at 08:08

## 2022-01-01 RX ADMIN — CHLOROTHIAZIDE SODIUM 1000 MG: 500 INJECTION, POWDER, LYOPHILIZED, FOR SOLUTION INTRAVENOUS at 11:45

## 2022-01-01 RX ADMIN — BUMETANIDE 5 MG: 1 TABLET ORAL at 14:23

## 2022-01-01 RX ADMIN — HYDRALAZINE HYDROCHLORIDE 100 MG: 100 TABLET, FILM COATED ORAL at 20:07

## 2022-01-01 RX ADMIN — POTASSIUM CHLORIDE 80 MEQ: 1500 TABLET, EXTENDED RELEASE ORAL at 20:06

## 2022-01-01 RX ADMIN — HYDRALAZINE HYDROCHLORIDE 100 MG: 100 TABLET, FILM COATED ORAL at 09:53

## 2022-01-01 RX ADMIN — NOREPINEPHRINE BITARTRATE 6.4 MCG: 1 INJECTION, SOLUTION, CONCENTRATE INTRAVENOUS at 09:12

## 2022-01-01 RX ADMIN — BUMETANIDE 5 MG: 1 TABLET ORAL at 08:07

## 2022-01-01 RX ADMIN — ZOLPIDEM TARTRATE 5 MG: 5 TABLET ORAL at 21:31

## 2022-01-01 RX ADMIN — LIDOCAINE HYDROCHLORIDE 100 MG: 20 INJECTION, SOLUTION INFILTRATION; PERINEURAL at 08:42

## 2022-01-01 RX ADMIN — ASPIRIN 81 MG: 81 TABLET, CHEWABLE ORAL at 09:06

## 2022-01-01 RX ADMIN — HYDRALAZINE HYDROCHLORIDE 100 MG: 100 TABLET, FILM COATED ORAL at 08:37

## 2022-01-01 RX ADMIN — LEVOTHYROXINE SODIUM 75 MCG: 0.07 TABLET ORAL at 06:41

## 2022-01-01 RX ADMIN — SENNOSIDES AND DOCUSATE SODIUM 1 TABLET: 8.6; 5 TABLET ORAL at 20:59

## 2022-01-01 RX ADMIN — CHLOROTHIAZIDE SODIUM 500 MG: 500 INJECTION, POWDER, LYOPHILIZED, FOR SOLUTION INTRAVENOUS at 16:31

## 2022-01-01 RX ADMIN — HYDRALAZINE HYDROCHLORIDE 100 MG: 100 TABLET, FILM COATED ORAL at 19:45

## 2022-01-01 RX ADMIN — AMLODIPINE BESYLATE 10 MG: 10 TABLET ORAL at 09:04

## 2022-01-01 RX ADMIN — BUMETANIDE 5 MG: 1 TABLET ORAL at 17:05

## 2022-01-01 RX ADMIN — Medication 400 MG: at 16:10

## 2022-01-01 RX ADMIN — INSULIN ASPART 1 UNITS: 100 INJECTION, SOLUTION INTRAVENOUS; SUBCUTANEOUS at 17:12

## 2022-01-01 RX ADMIN — TAMSULOSIN HYDROCHLORIDE 0.8 MG: 0.4 CAPSULE ORAL at 09:25

## 2022-01-01 RX ADMIN — LEVOTHYROXINE SODIUM 75 MCG: 0.07 TABLET ORAL at 06:45

## 2022-01-01 RX ADMIN — Medication 0.5 MG/HR: at 19:45

## 2022-01-01 RX ADMIN — WARFARIN SODIUM 2 MG: 1 TABLET ORAL at 17:13

## 2022-01-01 RX ADMIN — BUMETANIDE 5 MG: 1 TABLET ORAL at 08:45

## 2022-01-01 RX ADMIN — PANTOPRAZOLE SODIUM 40 MG: 40 TABLET, DELAYED RELEASE ORAL at 08:38

## 2022-01-01 RX ADMIN — AMIODARONE HYDROCHLORIDE 200 MG: 200 TABLET ORAL at 08:10

## 2022-01-01 RX ADMIN — ZOLPIDEM TARTRATE 5 MG: 5 TABLET ORAL at 20:38

## 2022-01-01 RX ADMIN — METHOCARBAMOL 500 MG: 500 TABLET, FILM COATED ORAL at 21:02

## 2022-01-01 RX ADMIN — ASPIRIN 81 MG: 81 TABLET, CHEWABLE ORAL at 09:25

## 2022-01-01 RX ADMIN — PANTOPRAZOLE SODIUM 40 MG: 40 TABLET, DELAYED RELEASE ORAL at 06:41

## 2022-01-01 RX ADMIN — POTASSIUM CHLORIDE 40 MEQ: 40 SOLUTION ORAL at 21:51

## 2022-01-01 RX ADMIN — FINASTERIDE 5 MG: 5 TABLET, FILM COATED ORAL at 07:57

## 2022-01-01 RX ADMIN — Medication 1 TABLET: at 09:05

## 2022-01-01 RX ADMIN — WARFARIN SODIUM 4 MG: 4 TABLET ORAL at 20:34

## 2022-01-01 RX ADMIN — HYDRALAZINE HYDROCHLORIDE 100 MG: 100 TABLET, FILM COATED ORAL at 20:13

## 2022-01-01 RX ADMIN — WARFARIN SODIUM 3 MG: 3 TABLET ORAL at 17:32

## 2022-01-01 RX ADMIN — CEFAZOLIN SODIUM 2 G: 2 INJECTION, SOLUTION INTRAVENOUS at 14:05

## 2022-01-01 RX ADMIN — Medication 400 MG: at 20:13

## 2022-01-01 RX ADMIN — CEFAZOLIN SODIUM 2 G: 2 INJECTION, SOLUTION INTRAVENOUS at 17:32

## 2022-01-01 RX ADMIN — MAGNESIUM OXIDE TAB 400 MG (241.3 MG ELEMENTAL MG) 400 MG: 400 (241.3 MG) TAB at 08:38

## 2022-01-01 RX ADMIN — CEFAZOLIN SODIUM 2 G: 2 INJECTION, SOLUTION INTRAVENOUS at 14:23

## 2022-01-01 RX ADMIN — ZOLPIDEM TARTRATE 5 MG: 5 TABLET ORAL at 20:45

## 2022-01-01 RX ADMIN — CEFAZOLIN SODIUM 2 G: 2 INJECTION, SOLUTION INTRAVENOUS at 10:14

## 2022-01-01 RX ADMIN — FLUOXETINE 30 MG: 20 CAPSULE ORAL at 09:12

## 2022-01-01 RX ADMIN — FLUOXETINE 30 MG: 20 CAPSULE ORAL at 09:18

## 2022-01-01 RX ADMIN — HYDRALAZINE HYDROCHLORIDE 100 MG: 100 TABLET, FILM COATED ORAL at 22:14

## 2022-01-01 RX ADMIN — FLUOXETINE 30 MG: 20 CAPSULE ORAL at 09:04

## 2022-01-01 RX ADMIN — ZOLPIDEM TARTRATE 5 MG: 5 TABLET ORAL at 20:13

## 2022-01-01 RX ADMIN — BUMETANIDE 5 MG: 1 TABLET ORAL at 08:51

## 2022-01-01 RX ADMIN — LEVOTHYROXINE SODIUM 75 MCG: 0.07 TABLET ORAL at 08:46

## 2022-01-01 RX ADMIN — CEFADROXIL 500 MG: 500 CAPSULE ORAL at 09:07

## 2022-01-01 RX ADMIN — FLUOXETINE 30 MG: 20 CAPSULE ORAL at 09:27

## 2022-01-01 RX ADMIN — POTASSIUM CHLORIDE 40 MEQ: 750 TABLET, EXTENDED RELEASE ORAL at 08:43

## 2022-01-01 RX ADMIN — WARFARIN SODIUM 1 MG: 1 TABLET ORAL at 17:33

## 2022-01-01 RX ADMIN — SENNOSIDES AND DOCUSATE SODIUM 1 TABLET: 8.6; 5 TABLET ORAL at 20:08

## 2022-01-01 RX ADMIN — MAGNESIUM OXIDE TAB 400 MG (241.3 MG ELEMENTAL MG) 400 MG: 400 (241.3 MG) TAB at 07:58

## 2022-01-01 RX ADMIN — DAPAGLIFLOZIN 5 MG: 5 TABLET, FILM COATED ORAL at 08:16

## 2022-01-01 RX ADMIN — HYDRALAZINE HYDROCHLORIDE 100 MG: 100 TABLET, FILM COATED ORAL at 08:09

## 2022-01-01 RX ADMIN — POTASSIUM CHLORIDE 80 MEQ: 40 SOLUTION ORAL at 14:54

## 2022-01-01 RX ADMIN — DAPTOMYCIN 500 MG: 500 INJECTION, POWDER, LYOPHILIZED, FOR SOLUTION INTRAVENOUS at 15:47

## 2022-01-01 RX ADMIN — POTASSIUM CHLORIDE 60 MEQ: 1500 TABLET, EXTENDED RELEASE ORAL at 09:53

## 2022-01-01 RX ADMIN — FINASTERIDE 5 MG: 5 TABLET, FILM COATED ORAL at 09:06

## 2022-01-01 RX ADMIN — POTASSIUM CHLORIDE 40 MEQ: 1500 TABLET, EXTENDED RELEASE ORAL at 12:40

## 2022-01-01 RX ADMIN — PANTOPRAZOLE SODIUM 40 MG: 40 TABLET, DELAYED RELEASE ORAL at 11:28

## 2022-01-01 RX ADMIN — POTASSIUM CHLORIDE 80 MEQ: 1500 TABLET, EXTENDED RELEASE ORAL at 20:25

## 2022-01-01 RX ADMIN — TAMSULOSIN HYDROCHLORIDE 0.8 MG: 0.4 CAPSULE ORAL at 06:53

## 2022-01-01 RX ADMIN — ISOSORBIDE MONONITRATE 90 MG: 60 TABLET, EXTENDED RELEASE ORAL at 09:23

## 2022-01-01 RX ADMIN — POTASSIUM CHLORIDE 80 MEQ: 1500 TABLET, EXTENDED RELEASE ORAL at 20:10

## 2022-01-01 RX ADMIN — FINASTERIDE 5 MG: 5 TABLET, FILM COATED ORAL at 09:54

## 2022-01-01 RX ADMIN — ACETAMINOPHEN 650 MG: 325 TABLET, FILM COATED ORAL at 09:24

## 2022-01-01 RX ADMIN — WARFARIN SODIUM 5 MG: 5 TABLET ORAL at 17:41

## 2022-01-01 RX ADMIN — TAMSULOSIN HYDROCHLORIDE 0.8 MG: 0.4 CAPSULE ORAL at 09:09

## 2022-01-01 RX ADMIN — ALLOPURINOL 200 MG: 100 TABLET ORAL at 09:52

## 2022-01-01 RX ADMIN — LEVOTHYROXINE SODIUM 75 MCG: 0.07 TABLET ORAL at 07:58

## 2022-01-01 RX ADMIN — PANTOPRAZOLE SODIUM 40 MG: 40 TABLET, DELAYED RELEASE ORAL at 07:58

## 2022-01-01 RX ADMIN — FENTANYL CITRATE 25 MCG: 50 INJECTION, SOLUTION INTRAMUSCULAR; INTRAVENOUS at 09:08

## 2022-01-01 RX ADMIN — NOREPINEPHRINE BITARTRATE 6.4 MCG: 1 INJECTION, SOLUTION, CONCENTRATE INTRAVENOUS at 09:17

## 2022-01-01 RX ADMIN — VANCOMYCIN HYDROCHLORIDE 1250 MG: 10 INJECTION, POWDER, LYOPHILIZED, FOR SOLUTION INTRAVENOUS at 20:13

## 2022-01-01 ASSESSMENT — ACTIVITIES OF DAILY LIVING (ADL)
ADLS_ACUITY_SCORE: 29
ADLS_ACUITY_SCORE: 28
ADLS_ACUITY_SCORE: 22
ADLS_ACUITY_SCORE: 30
ADLS_ACUITY_SCORE: 24
ADLS_ACUITY_SCORE: 30
ADLS_ACUITY_SCORE: 30
ADLS_ACUITY_SCORE: 22
ADLS_ACUITY_SCORE: 30
ADLS_ACUITY_SCORE: 22
ADLS_ACUITY_SCORE: 30
ADLS_ACUITY_SCORE: 24
ADLS_ACUITY_SCORE: 27
ADLS_ACUITY_SCORE: 22
ADLS_ACUITY_SCORE: 25
ADLS_ACUITY_SCORE: 30
ADLS_ACUITY_SCORE: 25
ADLS_ACUITY_SCORE: 24
ADLS_ACUITY_SCORE: 29
ADLS_ACUITY_SCORE: 22
ADLS_ACUITY_SCORE: 28
ADLS_ACUITY_SCORE: 35
ADLS_ACUITY_SCORE: 29
ADLS_ACUITY_SCORE: 30
ADLS_ACUITY_SCORE: 22
ADLS_ACUITY_SCORE: 30
ADLS_ACUITY_SCORE: 22
ADLS_ACUITY_SCORE: 24
ADLS_ACUITY_SCORE: 27
ADLS_ACUITY_SCORE: 24
ADLS_ACUITY_SCORE: 24
ADLS_ACUITY_SCORE: 35
ADLS_ACUITY_SCORE: 30
ADLS_ACUITY_SCORE: 25
ADLS_ACUITY_SCORE: 24
ADLS_ACUITY_SCORE: 22
ADLS_ACUITY_SCORE: 22
ADLS_ACUITY_SCORE: 24
ADLS_ACUITY_SCORE: 24
ADLS_ACUITY_SCORE: 25
ADLS_ACUITY_SCORE: 25
ADLS_ACUITY_SCORE: 28
DOING_ERRANDS_INDEPENDENTLY_DIFFICULTY: NO
ADLS_ACUITY_SCORE: 29
ADLS_ACUITY_SCORE: 24
ADLS_ACUITY_SCORE: 24
ADLS_ACUITY_SCORE: 30
ADLS_ACUITY_SCORE: 24
ADLS_ACUITY_SCORE: 26
ADLS_ACUITY_SCORE: 35
ADLS_ACUITY_SCORE: 30
ADLS_ACUITY_SCORE: 28
ADLS_ACUITY_SCORE: 30
ADLS_ACUITY_SCORE: 37
ADLS_ACUITY_SCORE: 24
ADLS_ACUITY_SCORE: 24
ADLS_ACUITY_SCORE: 28
ADLS_ACUITY_SCORE: 22
ADLS_ACUITY_SCORE: 29
ADLS_ACUITY_SCORE: 30
ADLS_ACUITY_SCORE: 22
ADLS_ACUITY_SCORE: 28
ADLS_ACUITY_SCORE: 24
ADLS_ACUITY_SCORE: 29
ADLS_ACUITY_SCORE: 28
ADLS_ACUITY_SCORE: 24
ADLS_ACUITY_SCORE: 27
ADLS_ACUITY_SCORE: 29
ADLS_ACUITY_SCORE: 24
ADLS_ACUITY_SCORE: 28
ADLS_ACUITY_SCORE: 29
ADLS_ACUITY_SCORE: 24
ADLS_ACUITY_SCORE: 24
ADLS_ACUITY_SCORE: 30
ADLS_ACUITY_SCORE: 28
ADLS_ACUITY_SCORE: 27
ADLS_ACUITY_SCORE: 22
ADLS_ACUITY_SCORE: 28
ADLS_ACUITY_SCORE: 25
ADLS_ACUITY_SCORE: 28
ADLS_ACUITY_SCORE: 24
ADLS_ACUITY_SCORE: 24
ADLS_ACUITY_SCORE: 22
ADLS_ACUITY_SCORE: 22
ADLS_ACUITY_SCORE: 35
WEAR_GLASSES_OR_BLIND: YES
CONCENTRATING,_REMEMBERING_OR_MAKING_DECISIONS_DIFFICULTY: NO
ADLS_ACUITY_SCORE: 30
ADLS_ACUITY_SCORE: 24
ADLS_ACUITY_SCORE: 29
ADLS_ACUITY_SCORE: 24
ADLS_ACUITY_SCORE: 30
ADLS_ACUITY_SCORE: 24
ADLS_ACUITY_SCORE: 24
ADLS_ACUITY_SCORE: 26
ADLS_ACUITY_SCORE: 29
ADLS_ACUITY_SCORE: 27
ADLS_ACUITY_SCORE: 29
ADLS_ACUITY_SCORE: 28
ADLS_ACUITY_SCORE: 30
ADLS_ACUITY_SCORE: 24
ADLS_ACUITY_SCORE: 30
ADLS_ACUITY_SCORE: 35
ADLS_ACUITY_SCORE: 27
ADLS_ACUITY_SCORE: 24
ADLS_ACUITY_SCORE: 30
ADLS_ACUITY_SCORE: 30
ADLS_ACUITY_SCORE: 28
ADLS_ACUITY_SCORE: 30
ADLS_ACUITY_SCORE: 24
ADLS_ACUITY_SCORE: 22
ADLS_ACUITY_SCORE: 30
ADLS_ACUITY_SCORE: 28
ADLS_ACUITY_SCORE: 24
ADLS_ACUITY_SCORE: 24
ADLS_ACUITY_SCORE: 37
ADLS_ACUITY_SCORE: 24
ADLS_ACUITY_SCORE: 24
ADLS_ACUITY_SCORE: 28
ADLS_ACUITY_SCORE: 28
ADLS_ACUITY_SCORE: 22
ADLS_ACUITY_SCORE: 26
ADLS_ACUITY_SCORE: 26
ADLS_ACUITY_SCORE: 30
ADLS_ACUITY_SCORE: 24
ADLS_ACUITY_SCORE: 22
ADLS_ACUITY_SCORE: 28
ADLS_ACUITY_SCORE: 35
ADLS_ACUITY_SCORE: 35
ADLS_ACUITY_SCORE: 24
WALKING_OR_CLIMBING_STAIRS_DIFFICULTY: NO
EQUIPMENT_CURRENTLY_USED_AT_HOME: CANE, STRAIGHT
ADLS_ACUITY_SCORE: 29
ADLS_ACUITY_SCORE: 30
DIFFICULTY_EATING/SWALLOWING: NO
ADLS_ACUITY_SCORE: 30
ADLS_ACUITY_SCORE: 22
ADLS_ACUITY_SCORE: 26
ADLS_ACUITY_SCORE: 26
ADLS_ACUITY_SCORE: 25
ADLS_ACUITY_SCORE: 28
ADLS_ACUITY_SCORE: 24
ADLS_ACUITY_SCORE: 28
ADLS_ACUITY_SCORE: 24
ADLS_ACUITY_SCORE: 28
ADLS_ACUITY_SCORE: 24
ADLS_ACUITY_SCORE: 30
ADLS_ACUITY_SCORE: 24
ADLS_ACUITY_SCORE: 30
ADLS_ACUITY_SCORE: 29
ADLS_ACUITY_SCORE: 27
ADLS_ACUITY_SCORE: 28
ADLS_ACUITY_SCORE: 28
TOILETING_ISSUES: NO
ADLS_ACUITY_SCORE: 24
ADLS_ACUITY_SCORE: 24
ADLS_ACUITY_SCORE: 25
ADLS_ACUITY_SCORE: 22
ADLS_ACUITY_SCORE: 24
ADLS_ACUITY_SCORE: 28
ADLS_ACUITY_SCORE: 22
ADLS_ACUITY_SCORE: 24
ADLS_ACUITY_SCORE: 29
ADLS_ACUITY_SCORE: 29
ADLS_ACUITY_SCORE: 30
ADLS_ACUITY_SCORE: 24
ADLS_ACUITY_SCORE: 35
ADLS_ACUITY_SCORE: 24
ADLS_ACUITY_SCORE: 28
ADLS_ACUITY_SCORE: 27
ADLS_ACUITY_SCORE: 30
ADLS_ACUITY_SCORE: 22
ADLS_ACUITY_SCORE: 24
ADLS_ACUITY_SCORE: 26
ADLS_ACUITY_SCORE: 24
ADLS_ACUITY_SCORE: 24
ADLS_ACUITY_SCORE: 25
ADLS_ACUITY_SCORE: 25
ADLS_ACUITY_SCORE: 30
ADLS_ACUITY_SCORE: 30
ADLS_ACUITY_SCORE: 25
ADLS_ACUITY_SCORE: 30
ADLS_ACUITY_SCORE: 26
ADLS_ACUITY_SCORE: 24
ADLS_ACUITY_SCORE: 30
ADLS_ACUITY_SCORE: 24
DEPENDENT_IADLS:: INDEPENDENT
ADLS_ACUITY_SCORE: 24
ADLS_ACUITY_SCORE: 29
ADLS_ACUITY_SCORE: 30
ADLS_ACUITY_SCORE: 24
ADLS_ACUITY_SCORE: 24
ADLS_ACUITY_SCORE: 30
ADLS_ACUITY_SCORE: 30
ADLS_ACUITY_SCORE: 29
ADLS_ACUITY_SCORE: 30
ADLS_ACUITY_SCORE: 28
ADLS_ACUITY_SCORE: 24
ADLS_ACUITY_SCORE: 30
ADLS_ACUITY_SCORE: 22
ADLS_ACUITY_SCORE: 29
ADLS_ACUITY_SCORE: 30
ADLS_ACUITY_SCORE: 22
ADLS_ACUITY_SCORE: 22
ADLS_ACUITY_SCORE: 35
ADLS_ACUITY_SCORE: 22
ADLS_ACUITY_SCORE: 29
ADLS_ACUITY_SCORE: 30
ADLS_ACUITY_SCORE: 24
ADLS_ACUITY_SCORE: 30
ADLS_ACUITY_SCORE: 22
ADLS_ACUITY_SCORE: 29
ADLS_ACUITY_SCORE: 24
ADLS_ACUITY_SCORE: 22
ADLS_ACUITY_SCORE: 30
ADLS_ACUITY_SCORE: 22
ADLS_ACUITY_SCORE: 30
ADLS_ACUITY_SCORE: 22
ADLS_ACUITY_SCORE: 26
ADLS_ACUITY_SCORE: 30
ADLS_ACUITY_SCORE: 24
ADLS_ACUITY_SCORE: 35
ADLS_ACUITY_SCORE: 26
CHANGE_IN_FUNCTIONAL_STATUS_SINCE_ONSET_OF_CURRENT_ILLNESS/INJURY: NO
ADLS_ACUITY_SCORE: 25
ADLS_ACUITY_SCORE: 29
ADLS_ACUITY_SCORE: 25
ADLS_ACUITY_SCORE: 28
ADLS_ACUITY_SCORE: 28
ADLS_ACUITY_SCORE: 29
ADLS_ACUITY_SCORE: 24
ADLS_ACUITY_SCORE: 27
ADLS_ACUITY_SCORE: 27
ADLS_ACUITY_SCORE: 24
ADLS_ACUITY_SCORE: 30
ADLS_ACUITY_SCORE: 22
ADLS_ACUITY_SCORE: 24
ADLS_ACUITY_SCORE: 28
ADLS_ACUITY_SCORE: 30
ADLS_ACUITY_SCORE: 30
ADLS_ACUITY_SCORE: 25
ADLS_ACUITY_SCORE: 28
ADLS_ACUITY_SCORE: 30
FALL_HISTORY_WITHIN_LAST_SIX_MONTHS: NO
ADLS_ACUITY_SCORE: 28
ADLS_ACUITY_SCORE: 24
ADLS_ACUITY_SCORE: 30
DRESSING/BATHING_DIFFICULTY: NO
ADLS_ACUITY_SCORE: 24
ADLS_ACUITY_SCORE: 28
ADLS_ACUITY_SCORE: 22
ADLS_ACUITY_SCORE: 22

## 2022-01-01 ASSESSMENT — PAIN SCALES - GENERAL
PAINLEVEL: NO PAIN (0)

## 2022-01-01 ASSESSMENT — ENCOUNTER SYMPTOMS
SHORTNESS OF BREATH: 1
ABDOMINAL DISTENTION: 1
DYSRHYTHMIAS: 1
CONSTITUTIONAL NEGATIVE: 1

## 2022-01-03 ENCOUNTER — CARE COORDINATION (OUTPATIENT)
Dept: CARDIOLOGY | Facility: CLINIC | Age: 67
End: 2022-01-03
Payer: MEDICARE

## 2022-01-03 ENCOUNTER — TELEPHONE (OUTPATIENT)
Dept: INFECTIOUS DISEASES | Facility: CLINIC | Age: 67
End: 2022-01-03
Payer: MEDICARE

## 2022-01-03 NOTE — PROGRESS NOTES
Situation:   Writer spoke with Family today to discuss patient tested positive for Covid today.  Jed, wife states patient has had a cough since Thursday with chills and congestion. Wife states today, patient is experiencing labored breathing, difficulty breathing and shortness of breath. Wife stated he cannot be seen at PCP and was recommended to take patient to ED for evaluation.     Recommendation:  Will update primary VAD CC and Cardiologist. I instructed wife to take Eliseo to his local ED. If he needed to be admitted to have local MD call our [facility] cardiologist.   Family notified to page on-call coordinator if symptoms worsen or with other concerns. Family verbalized understanding.

## 2022-01-03 NOTE — TELEPHONE ENCOUNTER
[facility] Call Center    Phone Message    May a detailed message be left on voicemail: yes     Reason for Call: Other: Per Myrna with Infection prevention- the pt's wife left a message this morning with them requesting a call back from Dr. Bhatti's nurse. Myrna believes that the pt's wife was transferred to them incorrectly instead of to the ID clinic line. Per message the pt's wife left, the pt has covid-19, and that is why she was wanting a call back from Dr. Bhatti's nurses. Please review, and call back to discuss.     Action Taken: Message routed to:  [facility]): id    Travel Screening: Not Applicable

## 2022-01-03 NOTE — TELEPHONE ENCOUNTER
Based on the symptom description it sounds like he should be evaluated by a provider in the ED. He could be a potential candidate for outpatient monoclonal antibodies but based on symptom description he should be seen.

## 2022-01-07 ENCOUNTER — TELEPHONE (OUTPATIENT)
Dept: ANTICOAGULATION | Facility: CLINIC | Age: 67
End: 2022-01-07
Payer: MEDICARE

## 2022-01-07 DIAGNOSIS — Z95.811 LVAD (LEFT VENTRICULAR ASSIST DEVICE) PRESENT (H): Primary | ICD-10-CM

## 2022-01-07 DIAGNOSIS — I48.0 PAROXYSMAL ATRIAL FIBRILLATION (H): ICD-10-CM

## 2022-01-07 DIAGNOSIS — I50.22 CHRONIC SYSTOLIC CONGESTIVE HEART FAILURE (H): ICD-10-CM

## 2022-01-07 DIAGNOSIS — Z95.811 LVAD (LEFT VENTRICULAR ASSIST DEVICE) PRESENT (H): ICD-10-CM

## 2022-01-07 NOTE — TELEPHONE ENCOUNTER
1/7/22 Spoke to Eliseo.  Patient is Covid-19 positive.  He received the monoclonal antibody infusion.    He is in Quarantine for 14 days.  Recommend he continue pattern of Warfarin dosing.  Will check an INR in two weeks.  Postponed on Calendar.    KRISTEN Wilburn        ANTICOAGULATION     Eliseo Tanner is overdue for INR check.      Left message for patient to call and schedule lab appointment as soon as possible. If returning call, please schedule.     Char Duenas RN

## 2022-01-10 RX ORDER — AMIODARONE HYDROCHLORIDE 200 MG/1
TABLET ORAL
Qty: 90 TABLET | Refills: 3 | OUTPATIENT
Start: 2022-01-10

## 2022-01-12 ENCOUNTER — CARE COORDINATION (OUTPATIENT)
Dept: CARDIOLOGY | Facility: CLINIC | Age: 67
End: 2022-01-12
Payer: MEDICARE

## 2022-01-12 NOTE — PROGRESS NOTES
D:  Called pt/caregiver to check in with recent COVID diagnosis.  Pt went to local ER last week and rcvd monoclonal antibodies.  Caregiver reported daily improvements, stable LVAD parameters and weights.  I:  Offered emotional support and listening.    A:  COVID follow up.  P:  Will call VAD coordinator with further needs and questions.

## 2022-01-20 ENCOUNTER — ANTICOAGULATION THERAPY VISIT (OUTPATIENT)
Dept: ANTICOAGULATION | Facility: CLINIC | Age: 67
End: 2022-01-20
Payer: MEDICARE

## 2022-01-20 ENCOUNTER — TRANSFERRED RECORDS (OUTPATIENT)
Dept: HEALTH INFORMATION MANAGEMENT | Facility: CLINIC | Age: 67
End: 2022-01-20
Payer: MEDICARE

## 2022-01-20 DIAGNOSIS — I48.0 PAROXYSMAL ATRIAL FIBRILLATION (H): ICD-10-CM

## 2022-01-20 DIAGNOSIS — I50.22 CHRONIC SYSTOLIC CONGESTIVE HEART FAILURE (H): ICD-10-CM

## 2022-01-20 DIAGNOSIS — Z95.811 LVAD (LEFT VENTRICULAR ASSIST DEVICE) PRESENT (H): Primary | ICD-10-CM

## 2022-01-20 LAB — INR (EXTERNAL): 1.87 (ref 0.9–1.1)

## 2022-01-20 NOTE — PROGRESS NOTES
"ANTICOAGULATION MANAGEMENT     Eliseo Tanner 66 year old male is on warfarin with therapeutic INR result. (Goal INR 1.8-2.3)    Recent labs: (last 7 days)     01/20/22  0929   INR 1.87*       ASSESSMENT     Source(s): Chart Review and Patient/Caregiver Call       Warfarin doses taken: Warfarin taken as instructed    Diet: No new diet changes identified    New illness, injury, or hospitalization: Yes: recovering from COVID-\"on the mend\"     Medication/supplement changes: None noted    Signs or symptoms of bleeding or clotting: No    Previous INR: Supratherapeutic    Additional findings: Upcoming surgery/procedure [facility] 2/10/22     PLAN     Recommended plan for no diet, medication or health factor changes affecting INR     Dosing Instructions: Continue your current warfarin dose with next INR in 3 weeks       Summary  As of 1/20/2022    Full warfarin instructions:  2 mg every Mon, Thu, Sat; 4 mg all other days   Next INR check:  2/10/2022             Telephone call with  spouse Veronique who verbalizes understanding and agrees to plan and who agrees to plan and repeated back plan correctly    Check at provider office visit    Education provided: Goal range and significance of current result, Monitoring for bleeding signs and symptoms, Monitoring for clotting signs and symptoms, Importance of notifying clinic for changes in medications; a sooner lab recheck maybe needed., Importance of notifying clinic for diarrhea, nausea/vomiting, reduced intake, and/or illness; a sooner lab recheck maybe needed. and Contact [phone] with any changes, questions or concerns.     Plan made per [facility] anticoagulation protocol and per LVAD protocol    PACO MARQUEZ RN  Anticoagulation Clinic  1/20/2022    _______________________________________________________________________     Anticoagulation Episode Summary     Current INR goal:     TTR:  71.7 % (11.1 mo)   Target end date:  Indefinite   Send INR reminders to:  ANTICOAG LVAD    Indications "    LVAD (left ventricular assist device) present (H) [Z95.811]  Chronic systolic congestive heart failure (H) [I50.22]  Paroxysmal atrial fibrillation (H) [I48.0]           Comments:  INR GOAL RANGE CHANGE 8/17/21 1.8-2.3 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 HM 3  placed 2/18/2020, 81mg ASA, Speak to spouse, Veronique at [phone] 4/14/2020:  Lab: [facility]:(p)[phone] (f) [phone]         Anticoagulation Care Providers     Provider Role Specialty Phone number    Nader Cisneros MD Referring Cardiovascular Disease [phone]

## 2022-01-28 DIAGNOSIS — I48.0 PAROXYSMAL ATRIAL FIBRILLATION (H): ICD-10-CM

## 2022-01-28 DIAGNOSIS — Z95.811 LVAD (LEFT VENTRICULAR ASSIST DEVICE) PRESENT (H): ICD-10-CM

## 2022-01-28 RX ORDER — WARFARIN SODIUM 4 MG/1
TABLET ORAL
Qty: 90 TABLET | Refills: 3 | Status: ON HOLD | OUTPATIENT
Start: 2022-01-28 | End: 2022-01-01

## 2022-01-30 ENCOUNTER — HEALTH MAINTENANCE LETTER (OUTPATIENT)
Age: 67
End: 2022-01-30

## 2022-01-31 DIAGNOSIS — Z11.59 ENCOUNTER FOR SCREENING FOR OTHER VIRAL DISEASES: Primary | ICD-10-CM

## 2022-02-08 ENCOUNTER — CARE COORDINATION (OUTPATIENT)
Dept: CARDIOLOGY | Facility: CLINIC | Age: 67
End: 2022-02-08
Payer: MEDICARE

## 2022-02-08 VITALS — WEIGHT: 204 LBS | BODY MASS INDEX: 31.28 KG/M2

## 2022-02-08 DIAGNOSIS — I42.8 NONISCHEMIC CARDIOMYOPATHY (H): ICD-10-CM

## 2022-02-08 NOTE — PROGRESS NOTES
D:  Pt called to report a decreased weights since having COVID.  Pre COVID weights 218 and today 204.  Pt reports that he has been voiding every 30 minutes. Current LVAD numbers, Speed: 5500, Flow: 4.3, PI: 4.4, Power: 4.7.  He has noticed some variability in his PI recently, other parameters are stable.  Pt complaining of: new, intermittent, brief, self-resolving, dizzy spells recently.  Eliseo reports that he has only been taking Bumex 3 mg since having COVID (MAR updated to reflect this).  Pt scheduled for annual testing in 2 days: RHC, Echo & multiple provider appointments.  I:  Discussed situation with Dr. Cisneros.  Plan: hold Bumex Tuesday, Wednesday and Thursday until after testing.  Pt advised.  A:  Weight loss  P:  Pt verbalized understanding of the instructions given.  Will call VAD coordinator with further needs and questions.

## 2022-02-09 ENCOUNTER — TELEPHONE (OUTPATIENT)
Dept: CARDIOLOGY | Facility: CLINIC | Age: 67
End: 2022-02-09
Payer: MEDICARE

## 2022-02-09 RX ORDER — BUMETANIDE 1 MG/1
3 TABLET ORAL DAILY
Qty: 360 TABLET | Refills: 3 | COMMUNITY
Start: 2022-02-09 | End: 2022-02-10

## 2022-02-09 NOTE — PROGRESS NOTES
In person visit    HPI:   Eliseo Tanner is a 66 year old male with chronic systolic heart failure secondary to NICM now s/p HM 3 on 2/18, moderate CAD, HTN, ABHINAV on CPAP, DM2, CKD Stage III, ANA. His HM3 post-op course was complicated by retrosternal hematoma and bleeding in the lungs, RV failure,VT in ICU now on amiodarone and Afib w/AVR S/p DCCV on 2/28. He had pre-op proteus and enterococcus bacteremia and he has had MSSA bacteremia as well, s/p abx; later on had LDH elevation 2/2 to legionella c/b renal failure requiring temporary dialysis (off iHD since 3/10/2021).  He presents today for LVAD follow-up.    He was last seen in LVAD clinic by Dr. Cisneros 12/8/2021. His amlodipine was increased. His inr goal was decreased to 1.8-2.5 d/t recurrent nose bleeding.    This visit  He lost 16 lbs during COVID. Was running around 204. Home weight yesterday was 207. He has been holding his bumex since Tuesday.     No SOB at rest. Since having having COVID he is having more MIRANDA. Going up a flight of stairs would make him feel MIRANDA. No orthopnea. No PND. No LE edema. No abdominal edema. No lightheadedness, dizziness, pre-syncope or syncope. No palpitaitons. No chest pain. Appetite is improving after covid.      No blood in the urine or blood in the stool. No prolonged nosebleeds.    No headaches. No stroke symptoms.    He has chronic driveline drainage. He saw Dr. Bhatti today. She took pictures and cultured it. He had some redness today as well. No pain around thedriveline. No fevers or chills.    No LVAD alarms.    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    SOCIAL HISTORY:  Social History     Socioeconomic History     Marital status:      Spouse name: Not on file     Number of children: Not on file     Years of education: Not on file     Highest education level: Not on file   Occupational History     Not on file   Social Needs     Financial resource strain: Not on file     Food insecurity     Worry: Not on file     Inability: Not on file     Transportation needs     Medical: Not on file     Non-medical: Not on file   Tobacco Use     Smoking status: Not on file   Substance and Sexual Activity     Alcohol use: Not on file     Drug use: Not on file     Sexual activity: Not on file   Lifestyle     Physical activity     Days per week: Not on file     Minutes per session: Not on file     Stress: Not on file   Relationships     Social connections     Talks on phone: Not on file     Gets together: Not on file     Attends Hindu service: Not on file     Active member of club or organization: Not on file     Attends meetings of clubs or organizations: Not on file     Relationship status: Not on file     Intimate partner violence     Fear of current or ex partner: Not on file     Emotionally abused: Not on file     Physically abused: Not on file     Forced sexual activity: Not on file   Other Topics Concern     Not on file   Social History Narrative     Not on file       CURRENT MEDICATIONS:  allopurinol (ZYLOPRIM) 100 MG tablet, 2 tablets (200 mg) by Oral or Feeding Tube route daily  amiodarone (PACERONE) 200 MG tablet, Take 1 tablet (200 mg) by mouth daily  aspirin (ASA) 81 MG EC tablet, Take 1 tablet (81 mg) by mouth daily  atorvastatin (LIPITOR) 20 MG tablet, Take 1 tablet (20 mg) by mouth daily  B Complex-C-Folic Acid (VIRT-CAPS) 1 MG CAPS, Take 1 capsule by mouth  "daily  cefadroxil (DURICEF) 500 MG capsule, Take 1 capsule (500 mg) by mouth 2 times daily  co-enzyme Q-10 200 MG CAPS, 200 mg by Oral or Feeding Tube route daily  finasteride (PROSCAR) 5 MG tablet, Take 1 tablet (5 mg) by mouth daily Helps with urinary retention.  FLUoxetine (PROZAC) 10 MG capsule, Take 30 mg by mouth daily  hydrALAZINE (APRESOLINE) 50 MG tablet, Take 2 tabs TID  insulin glargine (BASAGLAR KWIKPEN) 100 UNIT/ML pen, Inject 10 Units Subcutaneous At Bedtime   insulin pen needle (BD JAIME U/F) 32G X 4 MM miscellaneous,   magnesium oxide (MAG-OX) 400 MG tablet, 1 tablet (400 mg) by Oral or Feeding Tube route daily  nitroGLYcerin (NITROSTAT) 0.4 MG sublingual tablet, For chest pain place 1 tablet under the tongue every 5 minutes for 3 doses. If symptoms persist 5 minutes after 1st dose call 911.  omeprazole (PRILOSEC) 20 MG DR capsule, Take 20 mg by mouth daily  senna-docusate (SENOKOT-S/PERICOLACE) 8.6-50 MG tablet, Take 1 tablet by mouth 2 times daily as needed for constipation   tamsulosin (FLOMAX) 0.4 MG capsule, Take 1 capsule (0.4 mg) by mouth daily This med helps with urinary retention  warfarin ANTICOAGULANT (COUMADIN) 2 MG tablet, Take 4 mg by mouth daily   warfarin ANTICOAGULANT (COUMADIN) 4 MG tablet, TAKE 1 TO 2 TABLETS (4 MG TO 8 MG) BY MOUTH ONCE DAILY AS DIRECTED  zolpidem (AMBIEN) 5 MG tablet, Take 5 mg by mouth nightly as needed for Insomnia    [COMPLETED] lidocaine (LMX4) cream        ROS:   See HPI    EXAM:  BP (!) 118/0 (BP Location: Right arm, Patient Position: Chair, Cuff Size: Adult Regular)   Pulse 50   Ht 1.699 m (5' 6.89\")   Wt 97.1 kg (214 lb)   SpO2 97%   BMI 33.63 kg/m      GENERAL: Appears comfortable, in no acute distress.   HEENT: Eye symmetrical, no discharge or icterus bilaterally. Mucous membranes moist and without lesions.  CV: Hum of HM3, occasional S1S2. JVP ~12.   RESPIRATORY: Respirations regular, even, and unlabored. Lungs CTA throughout.   GI: Soft, non " distended with normoactive bowel sounds. No tenderness, rebound, guarding.   EXTREMITIES: Trace b/l peripheral edema. 2+ radial pulses  NEUROLOGIC: Alert and interacting appropriately. No focal deficits.   MUSCULOSKELETAL: No joint swelling or tenderness.   SKIN: No jaundice. No rashes or lesions. Driveline dressing c/d/i.      Labs - as reviewed in clinic with patient today:  CBC RESULTS:  Lab Results   Component Value Date    WBC 7.9 02/10/2022    WBC 7.9 03/19/2021    RBC 4.85 02/10/2022    RBC 3.88 03/19/2021    HGB 13.0 (L) 02/10/2022    HGB 13.1 (L) 02/10/2022    HGB 10.1 (A) 03/19/2021    HCT 41.4 02/10/2022    HCT 32.2 03/19/2021    MCV 85 02/10/2022    MCV 83.0 03/19/2021    MCH 27.0 02/10/2022    MCH 26.0 03/19/2021    MCHC 31.6 02/10/2022    MCHC 31.4 03/19/2021    RDW 17.6 (H) 02/10/2022    RDW 22.8 03/19/2021     02/10/2022     03/19/2021     03/17/2021       CMP RESULTS:  Lab Results   Component Value Date     02/10/2022     04/09/2021    POTASSIUM 4.2 02/10/2022    POTASSIUM 4.2 04/09/2021    CHLORIDE 108 02/10/2022    CHLORIDE 103 04/09/2021    CO2 21 02/10/2022    CO2 28 04/09/2021    ANIONGAP 9 02/10/2022    ANIONGAP 8 04/09/2021     (H) 02/10/2022     (A) 04/09/2021    BUN 31 (H) 02/10/2022    BUN 35 04/09/2021    CR 1.29 (H) 02/10/2022    CR 1.6 04/09/2021    GFRESTIMATED 60 (L) 02/10/2022    GFRESTIMATED 34 03/19/2021    GFRESTBLACK 39 (L) 03/17/2021    CALOS 8.4 (L) 02/10/2022    CALOS 9.0 04/09/2021    BILITOTAL 0.4 02/10/2022    BILITOTAL 0.5 03/19/2021    ALBUMIN 2.3 (L) 02/10/2022    ALBUMIN 4.4 03/19/2021    ALKPHOS 177 (H) 02/10/2022    ALKPHOS 138.0 03/19/2021    ALT 55 02/10/2022    ALT 35 03/19/2021    AST 54 (H) 02/10/2022    AST 60.0 03/19/2021        INR RESULTS:  Lab Results   Component Value Date    INR 1.7 (L) 02/10/2022    INR 1.87 (A) 01/20/2022    INR 2.1 12/09/2021    INR 3.1 (A) 07/09/2021       Lab Results   Component Value Date     MAG 1.5 (L) 03/17/2021     Lab Results   Component Value Date    NTBNPI 27,781 (H) 02/15/2021     Lab Results   Component Value Date    NTBNP 4,416 (H) 02/10/2022       Diagnostics    1/7/2021 RHC   Systolic (mmHg) Diastolic (mmHg) Mean (mmHg) A Wave (mmHg) V Wave (mmHg) EDP (mmHg) Max dp/dt (mmHg/sec) HR (bpm) Content (mL/dL) SAT (%)    RA Pressures  1:03 PM   5    4    9      84        RV Pressures  1:03 PM 24        5     84        PA Pressures  1:04 PM 24    7    13        88        PCW Pressures  1:04 PM   3        55          1/5/2021 ECHO    Interpretation Summary  Heartmate III LVAD, speed 5500 RPM     Left ventricular size is normal. Severely (EF 10-15%) reduced left ventricular  function is present.     LVAD cannula is not well seen in the LV apex. The outflow graft is well  visualized and Doppler is normal .     Mild to moderate right ventricular dilation is present. Global right  ventricular function is mildly to moderately reduced.     Aortic valve opens with every heart beat.     The inferior vena cava was normal in size with preserved respiratory  variability.     No pericardial effusion is present.      Assessment and Plan:   Eliseo Tanner is a 66 year old male with chronic systolic heart failure secondary to NICM now s/p HM 3 on 2/18, moderate CAD, HTN, ABHINAV on CPAP, DM2, CKD Stage III, ANA. His HM3 post-op course was complicated by retrosternal hematoma and bleeding in the lungs, RV failure,VT in ICU now on amiodarone and Afib w/AVR S/p DCCV on 2/28. He had pre-op proteus and enterococcus bacteremia and he has had MSSA bacteremia as well, s/p abx; later on had LDH elevation 2/2 to legionella c/b renal failure requiring temporary dialysis (off iHD since 3/10/2021).  He presents today for LVAD follow-up.    He had been holding his bumex since Tuesday for symptoms of hypervolemia. He had a RHC today which showed moderatly elevated filling pressures b/l, so we will restart bumex. After we get a  few lbs of diuresis, we will decrease his matinance dose slightly.    He was also bradycardic today. His EKG revealed sinus bradycardia without significant blocks. His low rate on his pacemaker was increased from 40 bmp to 60 bmp and activity response was turned on. I did review the bradycardia and EKG with Dr. Cisneros, at this poin we will not decrease or stop amiodarone. He is not on other bradycardia inducing medications.    Chronic SCHF secondary to NICM s/p HM III with RV dysfunction. Implanted 2/18 and complicated by bleeding.   Stage D, NYHA Class IIIA  ACEi/ARB Hydralazine 100 mg TID. Increase amlodipine to 10 mg daily  BB Has been deferred given RHF, deferred d/t bradycardia now  Aldosterone antagonist deferred while other medical therapy is prioritized  SCD prophylaxis ICD  Fluid status Hypervolemic: Take Bumex 4 mg with Kcl 40 meq BID for 2 days, then decrease to Bumex 3 mg daily and kcl 40/20  MAP: Goal 65-85- note he has a strong radial pulse so we have a systolic over diastolic measurement. MAP is above goal today, but he also did not take any of his antihypertensives today. Increaseing amlodipine.  LDH: 325, stable  Anticoagulation: Coumadin per pharmacy.  Goal 1.8-2.5 for recurrent nosebleeding, INR 2.5  Antiplatelet: ASA 81 mg po daily    VAD Interrogation on 2/10/22. VAD interrogation reviewed with VAD coordinator. Agree with findings. History goes back 5 days. Occasional PI events. No power spikes, speed drops, or other findings suspicious of pump malfunction noted.      CKD stage IV  Baseline has increased over the last year. Cuttent B/l is around 1.8-2.2.   - Improved today to 1.3  - Diuretic management as above  - Will defer to renal for further w/u    MSSA Driveline infection, ongoing drainage  - Patient saw Dr. Bhatti today, management per her and the ID team  - On Cefadroxil for now     A. Fib.  Sinus Bradycardia   History of Afib w/ RVR and aberrancy vs. FPC vs. AT vs. Slow VT. S/p DCCV on  2/28 back into sinus rhythm. Has been tachycardic in the past but now in sinus bradycardia with increased RV pacing. Discussed wit Dr. Cisneros today as above  - Continue on amiodarone  - Increased lower pacer rate from 40 to 60 and turned on rate response    HTN. Note he has a strong radial pulse and has a systolic over diastolic BP measurement. He also did not take his BP medications today. MAP at home have been improved  - Increase amlodipine to 10 mg daily  - Continue hydralazine     History of HIT  - Avoid heparin products   Follow up   - Dr. Cisneros in 3 months in [place]  - ICD check and RHC today    Billing  - I reviewed 3+ tests  - I managed 2 stable chronic problems  - I changed a prescription medication    BIJAL Padron TAMAS

## 2022-02-10 ENCOUNTER — OFFICE VISIT (OUTPATIENT)
Dept: CARDIOLOGY | Facility: CLINIC | Age: 67
End: 2022-02-10
Attending: INTERNAL MEDICINE
Payer: MEDICARE

## 2022-02-10 ENCOUNTER — ANCILLARY PROCEDURE (OUTPATIENT)
Dept: CARDIOLOGY | Facility: CLINIC | Age: 67
End: 2022-02-10
Attending: PHYSICIAN ASSISTANT
Payer: MEDICARE

## 2022-02-10 ENCOUNTER — LAB (OUTPATIENT)
Dept: LAB | Facility: CLINIC | Age: 67
End: 2022-02-10
Attending: INTERNAL MEDICINE
Payer: MEDICARE

## 2022-02-10 ENCOUNTER — ANTICOAGULATION THERAPY VISIT (OUTPATIENT)
Dept: ANTICOAGULATION | Facility: CLINIC | Age: 67
End: 2022-02-10

## 2022-02-10 ENCOUNTER — HOSPITAL ENCOUNTER (OUTPATIENT)
Facility: CLINIC | Age: 67
Discharge: HOME | End: 2022-02-10
Attending: INTERNAL MEDICINE | Admitting: INTERNAL MEDICINE
Payer: MEDICARE

## 2022-02-10 ENCOUNTER — APPOINTMENT (OUTPATIENT)
Dept: MEDSURG UNIT | Facility: CLINIC | Age: 67
End: 2022-02-10
Attending: INTERNAL MEDICINE
Payer: MEDICARE

## 2022-02-10 ENCOUNTER — OFFICE VISIT (OUTPATIENT)
Dept: INFECTIOUS DISEASES | Facility: CLINIC | Age: 67
End: 2022-02-10
Attending: INTERNAL MEDICINE
Payer: MEDICARE

## 2022-02-10 VITALS
HEART RATE: 45 BPM | WEIGHT: 216.2 LBS | DIASTOLIC BLOOD PRESSURE: 84 MMHG | HEIGHT: 67 IN | TEMPERATURE: 98.6 F | BODY MASS INDEX: 33.93 KG/M2 | SYSTOLIC BLOOD PRESSURE: 118 MMHG

## 2022-02-10 VITALS — HEART RATE: 61 BPM | OXYGEN SATURATION: 94 % | DIASTOLIC BLOOD PRESSURE: 101 MMHG | SYSTOLIC BLOOD PRESSURE: 127 MMHG

## 2022-02-10 DIAGNOSIS — I42.8 NONISCHEMIC CARDIOMYOPATHY (H): ICD-10-CM

## 2022-02-10 DIAGNOSIS — I50.22 CHRONIC SYSTOLIC CONGESTIVE HEART FAILURE (H): ICD-10-CM

## 2022-02-10 DIAGNOSIS — I50.814 RIGHT HEART FAILURE SECONDARY TO LEFT HEART FAILURE (H): ICD-10-CM

## 2022-02-10 DIAGNOSIS — Z79.899 OTHER LONG TERM (CURRENT) DRUG THERAPY: ICD-10-CM

## 2022-02-10 DIAGNOSIS — Z95.811 LVAD (LEFT VENTRICULAR ASSIST DEVICE) PRESENT (H): Primary | ICD-10-CM

## 2022-02-10 DIAGNOSIS — R00.1 BRADYCARDIA: ICD-10-CM

## 2022-02-10 DIAGNOSIS — T82.7XXA INFECTION ASSOCIATED WITH DRIVELINE OF LEFT VENTRICULAR ASSIST DEVICE (LVAD) (H): Primary | ICD-10-CM

## 2022-02-10 DIAGNOSIS — I50.22 CHRONIC SYSTOLIC CONGESTIVE HEART FAILURE (H): Primary | ICD-10-CM

## 2022-02-10 DIAGNOSIS — I42.8 NONISCHEMIC CARDIOMYOPATHY (H): Primary | ICD-10-CM

## 2022-02-10 DIAGNOSIS — D50.9 IRON DEFICIENCY ANEMIA, UNSPECIFIED IRON DEFICIENCY ANEMIA TYPE: ICD-10-CM

## 2022-02-10 DIAGNOSIS — Z45.02 ENCOUNTER FOR ADJUSTMENT AND MANAGEMENT OF AUTOMATIC IMPLANTABLE CARDIAC DEFIBRILLATOR: ICD-10-CM

## 2022-02-10 DIAGNOSIS — I50.23 ACUTE ON CHRONIC SYSTOLIC CONGESTIVE HEART FAILURE (H): ICD-10-CM

## 2022-02-10 DIAGNOSIS — I48.0 PAROXYSMAL ATRIAL FIBRILLATION (H): ICD-10-CM

## 2022-02-10 LAB
6 MIN WALK (FT): 970 FT
6 MIN WALK (M): 296 M
ALBUMIN SERPL-MCNC: 2.3 G/DL (ref 3.4–5)
ALP SERPL-CCNC: 177 U/L (ref 40–150)
ALT SERPL W P-5'-P-CCNC: 55 U/L (ref 0–70)
AMPHETAMINES UR QL SCN: NORMAL
ANION GAP SERPL CALCULATED.3IONS-SCNC: 9 MMOL/L (ref 3–14)
AST SERPL W P-5'-P-CCNC: 54 U/L (ref 0–45)
BARBITURATES UR QL: NORMAL
BASOPHILS # BLD AUTO: 0.1 10E3/UL (ref 0–0.2)
BASOPHILS NFR BLD AUTO: 1 %
BENZODIAZ UR QL: NORMAL
BILIRUB SERPL-MCNC: 0.4 MG/DL (ref 0.2–1.3)
BUN SERPL-MCNC: 31 MG/DL (ref 7–30)
CALCIUM SERPL-MCNC: 8.4 MG/DL (ref 8.5–10.1)
CANNABINOIDS UR QL SCN: NORMAL
CHLORIDE BLD-SCNC: 108 MMOL/L (ref 94–109)
CO2 SERPL-SCNC: 21 MMOL/L (ref 20–32)
COCAINE UR QL: NORMAL
CREAT SERPL-MCNC: 1.29 MG/DL (ref 0.66–1.25)
D DIMER PPP FEU-MCNC: 2.75 UG/ML FEU (ref 0–0.5)
EOSINOPHIL # BLD AUTO: 0.6 10E3/UL (ref 0–0.7)
EOSINOPHIL NFR BLD AUTO: 7 %
ERYTHROCYTE [DISTWIDTH] IN BLOOD BY AUTOMATED COUNT: 17.6 % (ref 10–15)
FERRITIN SERPL-MCNC: 124 NG/ML (ref 26–388)
GFR SERPL CREATININE-BSD FRML MDRD: 60 ML/MIN/1.73M2
GLUCOSE BLD-MCNC: 131 MG/DL (ref 70–99)
HCT VFR BLD AUTO: 41.4 % (ref 40–53)
HGB BLD-MCNC: 12.8 G/DL (ref 13.3–17.7)
HGB BLD-MCNC: 13 G/DL (ref 13.3–17.7)
HGB BLD-MCNC: 13.1 G/DL (ref 13.3–17.7)
IMM GRANULOCYTES # BLD: 0.2 10E3/UL
IMM GRANULOCYTES NFR BLD: 2 %
INR BLD: 1.7 (ref 2–3)
IRON SATN MFR SERPL: 14 % (ref 15–46)
IRON SERPL-MCNC: 34 UG/DL (ref 35–180)
LDH SERPL L TO P-CCNC: 325 U/L (ref 85–227)
LVEF ECHO: NORMAL
LYMPHOCYTES # BLD AUTO: 1.2 10E3/UL (ref 0.8–5.3)
LYMPHOCYTES NFR BLD AUTO: 15 %
MCH RBC QN AUTO: 27 PG (ref 26.5–33)
MCHC RBC AUTO-ENTMCNC: 31.6 G/DL (ref 31.5–36.5)
MCV RBC AUTO: 85 FL (ref 78–100)
MDC_IDC_LEAD_IMPLANT_DT: NORMAL
MDC_IDC_LEAD_LOCATION: NORMAL
MDC_IDC_LEAD_LOCATION_DETAIL_1: NORMAL
MDC_IDC_LEAD_MFG: NORMAL
MDC_IDC_LEAD_MODEL: NORMAL
MDC_IDC_LEAD_POLARITY_TYPE: NORMAL
MDC_IDC_LEAD_SERIAL: NORMAL
MDC_IDC_LEAD_SPECIAL_FUNCTION: NORMAL
MDC_IDC_MSMT_BATTERY_DTM: NORMAL
MDC_IDC_MSMT_BATTERY_REMAINING_LONGEVITY: 124 MO
MDC_IDC_MSMT_BATTERY_RRT_TRIGGER: 2.73
MDC_IDC_MSMT_BATTERY_STATUS: NORMAL
MDC_IDC_MSMT_BATTERY_VOLTAGE: 2.99 V
MDC_IDC_MSMT_CAP_CHARGE_DTM: NORMAL
MDC_IDC_MSMT_CAP_CHARGE_ENERGY: 18 J
MDC_IDC_MSMT_CAP_CHARGE_TIME: 3.68
MDC_IDC_MSMT_CAP_CHARGE_TYPE: NORMAL
MDC_IDC_MSMT_LEADCHNL_RV_IMPEDANCE_VALUE: 304 OHM
MDC_IDC_MSMT_LEADCHNL_RV_IMPEDANCE_VALUE: 418 OHM
MDC_IDC_MSMT_LEADCHNL_RV_PACING_THRESHOLD_AMPLITUDE: 0.5 V
MDC_IDC_MSMT_LEADCHNL_RV_PACING_THRESHOLD_PULSEWIDTH: 0.4 MS
MDC_IDC_MSMT_LEADCHNL_RV_SENSING_INTR_AMPL: 11.75 MV
MDC_IDC_MSMT_LEADCHNL_RV_SENSING_INTR_AMPL: 13.62 MV
MDC_IDC_PG_IMPLANT_DTM: NORMAL
MDC_IDC_PG_MFG: NORMAL
MDC_IDC_PG_MODEL: NORMAL
MDC_IDC_PG_SERIAL: NORMAL
MDC_IDC_PG_TYPE: NORMAL
MDC_IDC_SESS_CLINIC_NAME: NORMAL
MDC_IDC_SESS_DTM: NORMAL
MDC_IDC_SESS_TYPE: NORMAL
MDC_IDC_SET_BRADY_HYSTRATE: NORMAL
MDC_IDC_SET_BRADY_LOWRATE: 60 {BEATS}/MIN
MDC_IDC_SET_BRADY_MAX_SENSOR_RATE: 115 {BEATS}/MIN
MDC_IDC_SET_BRADY_MODE: NORMAL
MDC_IDC_SET_LEADCHNL_RV_PACING_AMPLITUDE: 2 V
MDC_IDC_SET_LEADCHNL_RV_PACING_ANODE_ELECTRODE_1: NORMAL
MDC_IDC_SET_LEADCHNL_RV_PACING_ANODE_LOCATION_1: NORMAL
MDC_IDC_SET_LEADCHNL_RV_PACING_CAPTURE_MODE: NORMAL
MDC_IDC_SET_LEADCHNL_RV_PACING_CATHODE_ELECTRODE_1: NORMAL
MDC_IDC_SET_LEADCHNL_RV_PACING_CATHODE_LOCATION_1: NORMAL
MDC_IDC_SET_LEADCHNL_RV_PACING_POLARITY: NORMAL
MDC_IDC_SET_LEADCHNL_RV_PACING_PULSEWIDTH: 0.4 MS
MDC_IDC_SET_LEADCHNL_RV_SENSING_ANODE_ELECTRODE_1: NORMAL
MDC_IDC_SET_LEADCHNL_RV_SENSING_ANODE_LOCATION_1: NORMAL
MDC_IDC_SET_LEADCHNL_RV_SENSING_CATHODE_ELECTRODE_1: NORMAL
MDC_IDC_SET_LEADCHNL_RV_SENSING_CATHODE_LOCATION_1: NORMAL
MDC_IDC_SET_LEADCHNL_RV_SENSING_POLARITY: NORMAL
MDC_IDC_SET_LEADCHNL_RV_SENSING_SENSITIVITY: 0.3 MV
MDC_IDC_SET_ZONE_DETECTION_BEATS_DENOMINATOR: 40 {BEATS}
MDC_IDC_SET_ZONE_DETECTION_BEATS_NUMERATOR: 30 {BEATS}
MDC_IDC_SET_ZONE_DETECTION_INTERVAL: 270 MS
MDC_IDC_SET_ZONE_DETECTION_INTERVAL: 460 MS
MDC_IDC_SET_ZONE_DETECTION_INTERVAL: 500 MS
MDC_IDC_SET_ZONE_DETECTION_INTERVAL: NORMAL
MDC_IDC_SET_ZONE_TYPE: NORMAL
MDC_IDC_STAT_AT_BURDEN_PERCENT: 0 %
MDC_IDC_STAT_AT_DTM_END: NORMAL
MDC_IDC_STAT_AT_DTM_START: NORMAL
MDC_IDC_STAT_BRADY_DTM_END: NORMAL
MDC_IDC_STAT_BRADY_DTM_START: NORMAL
MDC_IDC_STAT_BRADY_RV_PERCENT_PACED: 42.33 %
MDC_IDC_STAT_EPISODE_RECENT_COUNT: 0
MDC_IDC_STAT_EPISODE_RECENT_COUNT: 1
MDC_IDC_STAT_EPISODE_RECENT_COUNT_DTM_END: NORMAL
MDC_IDC_STAT_EPISODE_RECENT_COUNT_DTM_START: NORMAL
MDC_IDC_STAT_EPISODE_TOTAL_COUNT: 0
MDC_IDC_STAT_EPISODE_TOTAL_COUNT: 2
MDC_IDC_STAT_EPISODE_TOTAL_COUNT: 233
MDC_IDC_STAT_EPISODE_TOTAL_COUNT_DTM_END: NORMAL
MDC_IDC_STAT_EPISODE_TOTAL_COUNT_DTM_START: NORMAL
MDC_IDC_STAT_EPISODE_TYPE: NORMAL
MDC_IDC_STAT_TACHYTHERAPY_ATP_DELIVERED_RECENT: 0
MDC_IDC_STAT_TACHYTHERAPY_ATP_DELIVERED_TOTAL: 0
MDC_IDC_STAT_TACHYTHERAPY_RECENT_DTM_END: NORMAL
MDC_IDC_STAT_TACHYTHERAPY_RECENT_DTM_START: NORMAL
MDC_IDC_STAT_TACHYTHERAPY_SHOCKS_ABORTED_RECENT: 0
MDC_IDC_STAT_TACHYTHERAPY_SHOCKS_ABORTED_TOTAL: 0
MDC_IDC_STAT_TACHYTHERAPY_SHOCKS_DELIVERED_RECENT: 0
MDC_IDC_STAT_TACHYTHERAPY_SHOCKS_DELIVERED_TOTAL: 0
MDC_IDC_STAT_TACHYTHERAPY_TOTAL_DTM_END: NORMAL
MDC_IDC_STAT_TACHYTHERAPY_TOTAL_DTM_START: NORMAL
MONOCYTES # BLD AUTO: 1 10E3/UL (ref 0–1.3)
MONOCYTES NFR BLD AUTO: 12 %
NEUTROPHILS # BLD AUTO: 4.9 10E3/UL (ref 1.6–8.3)
NEUTROPHILS NFR BLD AUTO: 63 %
NRBC # BLD AUTO: 0 10E3/UL
NRBC BLD AUTO-RTO: 0 /100
NT-PROBNP SERPL-MCNC: 4416 PG/ML (ref 0–125)
OPIATES UR QL SCN: NORMAL
OXYHGB MFR BLDV: 62 % (ref 92–100)
OXYHGB MFR BLDV: 93 % (ref 92–100)
PCP UR QL SCN: NORMAL
PLATELET # BLD AUTO: 245 10E3/UL (ref 150–450)
POTASSIUM BLD-SCNC: 4.2 MMOL/L (ref 3.4–5.3)
PROT SERPL-MCNC: 7.3 G/DL (ref 6.8–8.8)
RBC # BLD AUTO: 4.85 10E6/UL (ref 4.4–5.9)
SODIUM SERPL-SCNC: 138 MMOL/L (ref 133–144)
TIBC SERPL-MCNC: 244 UG/DL (ref 240–430)
TRANSFERRIN SERPL-MCNC: 181 MG/DL (ref 210–360)
URATE SERPL-MCNC: 4.2 MG/DL (ref 3.5–7.2)
WBC # BLD AUTO: 7.9 10E3/UL (ref 4–11)

## 2022-02-10 PROCEDURE — 80053 COMPREHEN METABOLIC PANEL: CPT | Performed by: PATHOLOGY

## 2022-02-10 PROCEDURE — 82810 BLOOD GASES O2 SAT ONLY: CPT

## 2022-02-10 PROCEDURE — 93282 PRGRMG EVAL IMPLANTABLE DFB: CPT

## 2022-02-10 PROCEDURE — 93282 PRGRMG EVAL IMPLANTABLE DFB: CPT | Performed by: INTERNAL MEDICINE

## 2022-02-10 PROCEDURE — 87077 CULTURE AEROBIC IDENTIFY: CPT | Performed by: INTERNAL MEDICINE

## 2022-02-10 PROCEDURE — 83550 IRON BINDING TEST: CPT | Performed by: PATHOLOGY

## 2022-02-10 PROCEDURE — 93010 ELECTROCARDIOGRAM REPORT: CPT | Performed by: INTERNAL MEDICINE

## 2022-02-10 PROCEDURE — 84466 ASSAY OF TRANSFERRIN: CPT | Performed by: INTERNAL MEDICINE

## 2022-02-10 PROCEDURE — 94618 PULMONARY STRESS TESTING: CPT | Performed by: INTERNAL MEDICINE

## 2022-02-10 PROCEDURE — 80307 DRUG TEST PRSMV CHEM ANLYZR: CPT | Performed by: INTERNAL MEDICINE

## 2022-02-10 PROCEDURE — 85610 PROTHROMBIN TIME: CPT

## 2022-02-10 PROCEDURE — 93282 PRGRMG EVAL IMPLANTABLE DFB: CPT | Mod: 26 | Performed by: INTERNAL MEDICINE

## 2022-02-10 PROCEDURE — 250N000009 HC RX 250: Performed by: INTERNAL MEDICINE

## 2022-02-10 PROCEDURE — 99213 OFFICE O/P EST LOW 20 MIN: CPT | Performed by: INTERNAL MEDICINE

## 2022-02-10 PROCEDURE — 84550 ASSAY OF BLOOD/URIC ACID: CPT | Performed by: PATHOLOGY

## 2022-02-10 PROCEDURE — 82728 ASSAY OF FERRITIN: CPT | Performed by: PATHOLOGY

## 2022-02-10 PROCEDURE — 80321 ALCOHOLS BIOMARKERS 1OR 2: CPT | Mod: 90 | Performed by: PATHOLOGY

## 2022-02-10 PROCEDURE — 93451 RIGHT HEART CATH: CPT | Performed by: INTERNAL MEDICINE

## 2022-02-10 PROCEDURE — 83615 LACTATE (LD) (LDH) ENZYME: CPT | Performed by: PATHOLOGY

## 2022-02-10 PROCEDURE — 36415 COLL VENOUS BLD VENIPUNCTURE: CPT | Performed by: PATHOLOGY

## 2022-02-10 PROCEDURE — 999N000132 HC STATISTIC PP CARE STAGE 1

## 2022-02-10 PROCEDURE — 87075 CULTR BACTERIA EXCEPT BLOOD: CPT | Performed by: INTERNAL MEDICINE

## 2022-02-10 PROCEDURE — 272N000001 HC OR GENERAL SUPPLY STERILE: Performed by: INTERNAL MEDICINE

## 2022-02-10 PROCEDURE — 85018 HEMOGLOBIN: CPT

## 2022-02-10 PROCEDURE — G0463 HOSPITAL OUTPT CLINIC VISIT: HCPCS | Mod: 25

## 2022-02-10 PROCEDURE — 93750 INTERROGATION VAD IN PERSON: CPT | Performed by: PHYSICIAN ASSISTANT

## 2022-02-10 PROCEDURE — 83880 ASSAY OF NATRIURETIC PEPTIDE: CPT | Performed by: PATHOLOGY

## 2022-02-10 PROCEDURE — 99000 SPECIMEN HANDLING OFFICE-LAB: CPT | Performed by: PATHOLOGY

## 2022-02-10 PROCEDURE — 85379 FIBRIN DEGRADATION QUANT: CPT | Performed by: INTERNAL MEDICINE

## 2022-02-10 PROCEDURE — 93306 TTE W/DOPPLER COMPLETE: CPT | Performed by: INTERNAL MEDICINE

## 2022-02-10 PROCEDURE — 99214 OFFICE O/P EST MOD 30 MIN: CPT | Mod: 25 | Performed by: PHYSICIAN ASSISTANT

## 2022-02-10 PROCEDURE — 93005 ELECTROCARDIOGRAM TRACING: CPT

## 2022-02-10 PROCEDURE — 999N000142 HC STATISTIC PROCEDURE PREP ONLY

## 2022-02-10 PROCEDURE — G0463 HOSPITAL OUTPT CLINIC VISIT: HCPCS | Mod: 25,27

## 2022-02-10 PROCEDURE — 85025 COMPLETE CBC W/AUTO DIFF WBC: CPT | Performed by: PATHOLOGY

## 2022-02-10 RX ORDER — METHYLPREDNISOLONE SODIUM SUCCINATE 125 MG/2ML
125 INJECTION, POWDER, LYOPHILIZED, FOR SOLUTION INTRAMUSCULAR; INTRAVENOUS
Status: CANCELLED
Start: 2022-02-11

## 2022-02-10 RX ORDER — BUMETANIDE 1 MG/1
4 TABLET ORAL DAILY
Qty: 360 TABLET | Refills: 3 | COMMUNITY
Start: 2022-02-10 | End: 2022-02-11

## 2022-02-10 RX ORDER — AMLODIPINE BESYLATE 5 MG/1
10 TABLET ORAL DAILY
Qty: 135 TABLET | Refills: 3 | Status: SHIPPED | OUTPATIENT
Start: 2022-02-10 | End: 2022-04-01

## 2022-02-10 RX ORDER — EPINEPHRINE 1 MG/ML
0.3 INJECTION, SOLUTION, CONCENTRATE INTRAVENOUS EVERY 5 MIN PRN
Status: CANCELLED | OUTPATIENT
Start: 2022-02-11

## 2022-02-10 RX ORDER — ALBUTEROL SULFATE 0.83 MG/ML
2.5 SOLUTION RESPIRATORY (INHALATION)
Status: CANCELLED | OUTPATIENT
Start: 2022-02-11

## 2022-02-10 RX ORDER — NALOXONE HYDROCHLORIDE 0.4 MG/ML
0.2 INJECTION, SOLUTION INTRAMUSCULAR; INTRAVENOUS; SUBCUTANEOUS
Status: CANCELLED | OUTPATIENT
Start: 2022-02-11

## 2022-02-10 RX ORDER — LIDOCAINE 40 MG/G
CREAM TOPICAL
Status: COMPLETED | OUTPATIENT
Start: 2022-02-10 | End: 2022-02-10

## 2022-02-10 RX ORDER — DIPHENHYDRAMINE HYDROCHLORIDE 50 MG/ML
50 INJECTION INTRAMUSCULAR; INTRAVENOUS
Status: CANCELLED
Start: 2022-02-11

## 2022-02-10 RX ORDER — ALBUTEROL SULFATE 90 UG/1
1-2 AEROSOL, METERED RESPIRATORY (INHALATION)
Status: CANCELLED
Start: 2022-02-11

## 2022-02-10 RX ORDER — CEFADROXIL 500 MG/1
500 CAPSULE ORAL 2 TIMES DAILY
Qty: 120 CAPSULE | Refills: 1 | Status: SHIPPED | OUTPATIENT
Start: 2022-02-10 | End: 2022-06-08

## 2022-02-10 RX ADMIN — LIDOCAINE: 40 CREAM TOPICAL at 12:17

## 2022-02-10 ASSESSMENT — MIFFLIN-ST. JEOR
SCORE: 1697.58
SCORE: 1709.31

## 2022-02-10 ASSESSMENT — PAIN SCALES - GENERAL
PAINLEVEL: NO PAIN (0)
PAINLEVEL: NO PAIN (0)

## 2022-02-10 NOTE — NURSING NOTE
Chief Complaint   Patient presents with     Follow Up     vad 2 month follow up     Vitals were taken and medications reconciled.    Kareem Ellison, EMT  8:49 AM

## 2022-02-10 NOTE — PROGRESS NOTES
Condition is stable s/p RHC. Vital Signs are stable/WNL. RIJ site clean, dry and intact, cms intact. Discharge instructions reviewed with patient and questions answered. Patient verbalizes understanding. Pain under control. Patient is ambulating and voiding spontaneously. Patient is tolerating regular diet and denies any N/V. Patient to be discharged to home via wife. Patient has all belongings

## 2022-02-10 NOTE — Clinical Note
Potential access sites were evaluated for patency using ultrasound.   The right jugular vein was selected. Access was obtained under with Sonosite guidance using a micropuncture 21 gauge needle with direct visualization of needle entry.

## 2022-02-10 NOTE — PATIENT INSTRUCTIONS
Medications:  1. INCREASE amlodipine 10 mg daily. Please call if you become dizzy or lightheaded.        Instructions:  1. We will order iron infusion, we will [facility]  2. Please check your BP daily at home. Try to use the automatic BP cuff first and if you cannot get a reading use the doppler but we do believe you will be getting the systolic with the doppler.   3. CMP lab in 2 weeks     Follow-up: (make these appointments before you leave)  1. Please follow-up with VAD LISA in 12  months with labs prior.   2. Please follow-up with Dr. Cisneros in 3  months with labs prior in [place].   3. Inperson ICD check today       Page the VAD Coordinator on call if you gain more than 3 lb in a day or 5 in a week. Please also page if you feel unwell or have alarms.   Great to see you in clinic today. To Page the VAD Coordinator on call, dial [phone] option #4 and ask to speak to the VAD coordinator on call.

## 2022-02-10 NOTE — PATIENT INSTRUCTIONS
Please let us know if the drainage increases again and we can consider increasing the dose of cefadroxil

## 2022-02-10 NOTE — DISCHARGE INSTRUCTIONS
[facility]                        Interventional Cardiology  Discharge Instructions   Post Right Heart Cath      AFTER YOU GO HOME:    DO drink plenty of fluids    DO resume your regular diet and medications unless otherwise instructed by your Primary Physician    Do Not scrub the procedure site vigorously    No lotion or powder to the puncture site for 3 days    CALL YOUR PRIMARY PHYSICIAN IF: You may resume all normal activity.  Monitor neck site for bleeding, swelling, or voice changes. If you notice bleeding or swelling immediately apply pressure to the site and call number below to speak with Cardiology Fellow.  If you experience any changes in your breathing you should call your doctor immediately or come to the closest Emergency Department.  Do not drive yourself.    ADDITIONAL INSTRUCTIONS: Medications: You are to resume all home medications including anticoagulation therapy unless otherwise advised by your primary cardiologist or nurse coordinator.    Follow Up: Per your primary cardiology team    If you have any questions or concerns regarding your procedure site please call [phone] at anytime and ask for Cardiology Fellow on call.  They are available 24 hours a day.  You may also contact the Cardiology Clinic after hours number at [phone].                                                       Telephone Numbers [phone] Monday-Friday 8:00 am to 4:30 pm    [phone] After 4:30 pm Monday-Friday, Weekends & Holidays  Ask for Interventional Cardiologist on call. Someone is on call 24 hours/day   [facility] toll free number [phone] Monday-Friday 8:00 am to 4:30 pm   [facility] Emergency Dept [phone]

## 2022-02-10 NOTE — PROGRESS NOTES
ANTICOAGULATION MANAGEMENT     Eliseo Tanner 66 year old male is on warfarin with subtherapeutic INR result. 1.7-2.3    Recent labs: (last 7 days)     02/10/22  1223   INR 1.7*       ASSESSMENT     Source(s): Chart Review and Patient/Caregiver Call       Warfarin doses taken: Warfarin taken as instructed    Diet: No new diet changes identified    New illness, injury, or hospitalization: Patient had RHC today    Medication/supplement changes: None noted    Signs or symptoms of bleeding or clotting: No    Previous INR: Therapeutic last 2(+) visits    Additional findings: None     PLAN     Recommended plan for no diet, medication or health factor changes affecting INR     Dosing Instructions: Booster dose then continue your current warfarin dose with next INR in 1 week       Summary  As of 2/10/2022    Full warfarin instructions:  2/10: 4 mg; Otherwise 2 mg every Mon, Thu, Sat; 4 mg all other days   Next INR check:  2/17/2022             Telephone call with Eliseo who verbalizes understanding and agrees to plan and who agrees to plan and repeated back plan correctly    Patient using outside facility for labs    Education provided: Goal range and significance of current result and Importance of therapeutic range    Plan made per [facility] anticoagulation protocol and per LVAD protocol    Gloria Abbasi RN  Anticoagulation Clinic  2/10/2022    _______________________________________________________________________     Anticoagulation Episode Summary     Current INR goal:     TTR:  67.4 % (11.1 mo)   Target end date:  Indefinite   Send INR reminders to:  ANTICOAG LVAD    Indications    LVAD (left ventricular assist device) present (H) [Z95.811]  Chronic systolic congestive heart failure (H) [I50.22]  Paroxysmal atrial fibrillation (H) [I48.0]           Comments:  INR GOAL RANGE CHANGE 8/17/21 1.8-2.3 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 HM 3  placed 2/18/2020, 81mg ASA, Speak to spouse,  Veronique at [phone] 4/14/2020:  Lab: [facility]:(p)[phone] (f) [phone]         Anticoagulation Care Providers     Provider Role Specialty Phone number    Nader Cisneros MD Referring Cardiovascular Disease [phone]

## 2022-02-10 NOTE — LETTER
2/10/2022       RE: Eliseo Tanner  [address] [facility] [address]     Dear Colleague,    Thank you for referring your patient, Eliseo Tanner, to the [facility] [place] at [facility]. Please see a copy of my visit note below.    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 Cisneros  Primary ID provider: [place]    History of bacteremias (dates and organisms):   - MSSA bacteremia secondary to MSSA driveline infection (11/2020).  Remains on cefadroxil suppression.  The amount of drainage waxes and wanes.  Currently the amount of drainage is decreased.  Will obtain culture today.    History of driveline infections (dates and organisms): see above    History of other pertinent infections:   -Severe Legionella pneumonia serogroup 1L severe Legionella pneumonia c/b cardiogenic shock, oliguric renal failure requiring CRRT and respiratory decompensation requiring intubation. Urine antigen positive, sputum PCR positive plus pulmonary opacities. s/p azithromycin (completed 2/25)     Current suppressive antibiotics:   -cefadroxil 500 mg BID    Transplant Plan: destination therapy     Recommendations:  1. Continue cefadroxil 500 mg twice a day-refills provided  2. They will let me know the trajectory of the drainage and if it increases again. If it does we may need to consider increasing the dose of cefadroxil.  3. Obtain LVAD exit  site culture    Return to clinic 6 months    25 minutes spent on the date of the encounter doing chart review, review of test results, interpretation of tests, patient visit and documentation     Negar Bhatti DO  Infectious Diseases Attending  Pager [phone]     HPI:   Eliseo Tanner is a 66 year old male w/ chronic LVAD infection who presents for follow up. Last seen by me on 8/26/21.   Remains on cefadroxil 500 mg po BID.   Diagnosed with COVID 19 in January. He has recovered from it but still has some fatigue.   Wife has been changing the  LVAD dressing daily.  The amount of drainage is variable.  The color ranges from white to a brown tinge.  She has noted an occasional blood looking blister close to the driveline site that comes and goes.   Denies subjective fevers, chills, night sweats, nausea, vomiting, diarrhea.  He is tolerating cefadroxil well.    ROS: Remaining ROS is negative except as noted above.    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)      LVAD (left ventricular assist device) present (H)        Past Surgical History:  Past Surgical History:   Procedure Laterality Date     ANESTHESIA CARDIOVERSION N/A 2/28/2020    Procedure: ANESTHESIA, FOR CARDIOVERSION;  Surgeon: GENERIC ANESTHESIA PROVIDER;  Location: [facility] OR     CV CENTRAL VENOUS CATHETER PLACEMENT N/A 2/13/2020    Procedure: Central Venous Catheter Placement;  Surgeon: Chente Moss MD;  Location:  HEART CARDIAC CATH LAB     CV INTRA AORTIC BALLOON N/A 2/7/2020    Procedure: Intra-Aortic Balloon Pump Insertion;  Surgeon: Jose Baldwin MD;  Location:  HEART CARDIAC CATH LAB     CV INTRA AORTIC BALLOON N/A 2/13/2020    Procedure: Intra-Aortic Balloon Pump Insertion;  Surgeon: Chente Moss  MD Omaira;  Location:  HEART CARDIAC CATH LAB     CV RIGHT HEART CATH MEASUREMENTS RECORDED N/A 2020    Procedure: CV RIGHT HEART CATH;  Surgeon: Dalton Baeza MD;  Location:  HEART CARDIAC CATH LAB     CV RIGHT HEART CATH MEASUREMENTS RECORDED N/A 2021    Procedure: Right Heart Cath;  Surgeon: Chun Ball MD;  Location:  HEART CARDIAC CATH LAB     CV SWAN LUCIANA PROCEDURE N/A 2020    Procedure: [place] Luciana Procedure;  Surgeon: Chente Moss MD;  Location:  HEART CARDIAC CATH LAB     EP ICD Bilateral 3/16/2020    Procedure: EP ICD;  Surgeon: Dali Day MD;  Location:  HEART CARDIAC CATH LAB     INSERT VENTRICULAR ASSIST DEVICE LEFT (HEARTMATE II) N/A 2020    Procedure: INSERTION, LEFT VENTRICULAR ASSIST DEVICE (HEARTMATE III);  Surgeon: Mac Jaramillo MD;  Location:  OR     IR CVC TUNNEL PLACEMENT > 5 YRS OF AGE  2021     IR CVC TUNNEL REMOVAL RIGHT  3/16/2021     THORACOSCOPY Right 3/6/2020    Procedure: RIGHT VIDEO-ASSISTED THORASCOPIC SURGERY, EVACUATION OF HEMOTHORAX, PLACEMENT OF CHEST TUBES;  Surgeon: William Gan MD;  Location:  OR       Social History:  Social History     Socioeconomic History     Marital status:      Spouse name: Not on file     Number of children: Not on file     Years of education: Not on file     Highest education level: Not on file   Occupational History     Not on file   Tobacco Use     Smoking status: Former Smoker     Quit date: 1994     Years since quittin.8     Smokeless tobacco: Never Used   Substance and Sexual Activity     Alcohol use: Not Currently     Drug use: Not Currently     Sexual activity: Not on file   Other Topics Concern     Not on file   Social History Narrative     Not on file     Social Determinants of Health     Financial Resource Strain: Not on file   Food Insecurity: Not on file   Transportation Needs: Not on file   Physical Activity: Not on file   Stress: Not  on file   Social Connections: Not on file   Intimate Partner Violence: Not on file   Housing Stability: Not on file       Family Medical History:  No family history on file.    Allergies:     Allergies   Allergen Reactions     Heparin      HIT screen positive 2/14/20, reflex DAVINA negative; however heme recommended treating as if positive  HIT screen negative 2/11/20     Oxycodone Itching and Other (See Comments)     Chlorhexidine Rash       Medications:  Current Outpatient Medications   Medication Sig Dispense Refill     allopurinol (ZYLOPRIM) 100 MG tablet 2 tablets (200 mg) by Oral or Feeding Tube route daily 60 tablet 1     amiodarone (PACERONE) 200 MG tablet Take 1 tablet (200 mg) by mouth daily 90 tablet 3     amLODIPine (NORVASC) 5 MG tablet Take 1.5 tablets (7.5 mg) by mouth daily 135 tablet 3     aspirin (ASA) 81 MG EC tablet Take 1 tablet (81 mg) by mouth daily 90 tablet 3     atorvastatin (LIPITOR) 20 MG tablet Take 1 tablet (20 mg) by mouth daily 90 tablet 3     B Complex-C-Folic Acid (VIRT-CAPS) 1 MG CAPS Take 1 capsule by mouth daily 100 capsule 3     bumetanide (BUMEX) 1 MG tablet Take 3 tablets (3 mg) by mouth daily 360 tablet 3     cefadroxil (DURICEF) 500 MG capsule Take 1 capsule (500 mg) by mouth 2 times daily 56 capsule 1     co-enzyme Q-10 200 MG CAPS 200 mg by Oral or Feeding Tube route daily       finasteride (PROSCAR) 5 MG tablet Take 1 tablet (5 mg) by mouth daily Helps with urinary retention. 30 tablet 0     FLUoxetine (PROZAC) 10 MG capsule Take 30 mg by mouth daily       hydrALAZINE (APRESOLINE) 50 MG tablet Take 2 tabs  tablet 3     insulin glargine (BASAGLAR KWIKPEN) 100 UNIT/ML pen Inject 10 Units Subcutaneous At Bedtime        insulin pen needle (BD JAIME U/F) 32G X 4 MM miscellaneous        magnesium oxide (MAG-OX) 400 MG tablet 1 tablet (400 mg) by Oral or Feeding Tube route daily 30 tablet 1     nitroGLYcerin (NITROSTAT) 0.4 MG sublingual tablet For chest pain place 1 tablet  "under the tongue every 5 minutes for 3 doses. If symptoms persist 5 minutes after 1st dose call 911. 30 tablet 0     omeprazole (PRILOSEC) 20 MG DR capsule Take 20 mg by mouth daily       potassium chloride ER (KLOR-CON M) 20 MEQ CR tablet Take 2 tablets (40 mEq) by mouth 2 times daily 120 tablet 1     senna-docusate (SENOKOT-S/PERICOLACE) 8.6-50 MG tablet Take 1 tablet by mouth 2 times daily as needed for constipation (Patient not taking: Reported on 8/26/2021)       tamsulosin (FLOMAX) 0.4 MG capsule Take 1 capsule (0.4 mg) by mouth daily This med helps with urinary retention 30 capsule 0     warfarin ANTICOAGULANT (COUMADIN) 2 MG tablet Take 4 mg by mouth daily        warfarin ANTICOAGULANT (COUMADIN) 4 MG tablet TAKE 1 TO 2 TABLETS (4 MG TO 8 MG) BY MOUTH ONCE DAILY AS DIRECTED 90 tablet 3     zolpidem (AMBIEN) 5 MG tablet Take 5 mg by mouth nightly as needed for Insomnia         Immunizations:  Immunization History   Administered Date(s) Administered     COVID-19,PF,Moderna 02/11/2021     COVID-19,PF,Pfizer (12+ Yrs) 02/11/2021, 09/01/2021, 09/22/2021     Flu, Unspecified 11/15/2019     Influenza (High Dose) 3 valent vaccine 10/25/2019     Pneumo Conj 13-V (2010&after) 09/06/2018       Exam:  /84   Pulse (!) 45   Temp 98.6  F (37  C) (Oral)   Ht 1.702 m (5' 7\")   Wt 98.1 kg (216 lb 3.2 oz)   BMI 33.86 kg/m    Gen: Alert and in no distress.   Psych: Normal affect.   HEENT: PERRL. No icterus.  CV: LVAD hum present  Chest: Clear to auscultation bilaterally without wheezes or crackles.   Abdomen: Soft, non-distended. Non-tender. Normal bowel sounds.   Extremities: Warm and well perfused.   Skin: No rashes or lesions noted.     Driveline exit site:        Labs:  WBC   Date Value Ref Range Status   03/19/2021 7.9 10^9/L Final     WBC Count   Date Value Ref Range Status   08/17/2021 6.0 4.0 - 11.0 10e3/uL Final       CRP Inflammation   Date Value Ref Range Status   08/17/2021 4.9 0.0 - 8.0 mg/L Final "   02/16/2021 270.0 (H) 0.0 - 8.0 mg/L Final   02/15/2021 270.0 (H) 0.0 - 8.0 mg/L Final   02/11/2021 8.6 0.0 - 10.0 mg/L Final       Creatinine   Date Value Ref Range Status   08/17/2021 1.55 (H) 0.66 - 1.25 mg/dL Final   04/09/2021 1.6 mg/dL Final   03/19/2021 2.1 mg/dL Final   03/17/2021 2.00 (H) 0.66 - 1.25 mg/dL Final

## 2022-02-10 NOTE — NURSING NOTE
MCS VAD Pump Info     Row Name 02/10/22 0915             MCS VAD Information    Implant --      LVAD Pump HeartMate 3              Heartmate 3 (centrifugal flow) VS    Flow (Lpm) 4.3 Lpm      Pulse Index (PI) 4.1 PI      Speed (rpm) 5500 rpm      Power (muhammad) 4.7 muhammad      Current Hct setting 42      Retired: Unexpected Alarms --              Heartware LEFT (centrifugal flow) VS    Flow (Lpm) --      Flow waveform PEAK --      Flow waveform TROUGH --      Speed (rpm) --      Power (muhammad) --      Current Hct setting --      Retired: Unexpected Alarms --              Primary Controller    Serial number [facility] [id number]      Low flow alarm setting --      High watt alarm setting --      EBB: Patient use 23      Replace in 8 Months              Backup Controller    Serial number [facility] [place] [id number]      Replace EBB in 8 Months  11 uses      Speed & HCT match primary controller --              VAD Interrogation    Alarms reported by patient N      Unexpected alarms noted upon interrogation No External Power      PI events Occasional  history back 5 days PI 2.6-6.3 no speed drops      Damage to equipment is noted N      Action taken Reviewed proper equipment care and maintenance              Driveline Exit Site    Dressing change done N      Driveline properly secured Yes      DLES assessment c/d/i per pt report      Dressing used Daily kit      Dressing modifications --      Dressing change supplier --      Frequency patient changes dressing --                Biomed checked battery charger and reported button 3 and 4 not functioning. Battery charger replaced today [facility] [id number]    4)  Education Complete: Yes   Charge the BACKUP controller s backup battery every 6 months  Perform a self test on BACKUP every 6 months  Change the MPU s batteries every 6 months:Yes  Have equipment serviced yearly (if applicable):Yes

## 2022-02-10 NOTE — PATIENT INSTRUCTIONS
It was a pleasure to see you in clinic today. Please do not hesitate to call with any questions or concerns. You are scheduled for a remote ICD transmission in 3 months. This will happen automatically in the night. We look forward to seeing you in clinic at your next device check in 6 months.    Yessenia Montiel, RN  Electrophysiology Nurse Clinician  [facility]  During business hours call:  [phone]  Urgent needs after hours- please call: [phone]- select option #4 and ask for job code [id number].

## 2022-02-10 NOTE — LETTER
2/10/2022      RE: Eliseo Tanner  [address] [place] [address]       Dear Colleague,    Thank you for the opportunity to participate in the care of your patient, Eliseo Tanner, at the [facility] [place] at [facility]. Please see a copy of my visit note below.    In person visit    HPI:   Eliseo Tanner is a 66 year old male with chronic systolic heart failure secondary to NICM now s/p HM 3 on 2/18, moderate CAD, HTN, ABHINAV on CPAP, DM2, CKD Stage III, ANA. His HM3 post-op course was complicated by retrosternal hematoma and bleeding in the lungs, RV failure,VT in ICU now on amiodarone and Afib w/AVR S/p DCCV on 2/28. He had pre-op proteus and enterococcus bacteremia and he has had MSSA bacteremia as well, s/p abx; later on had LDH elevation 2/2 to legionella c/b renal failure requiring temporary dialysis (off iHD since 3/10/2021).  He presents today for LVAD follow-up.    He was last seen in LVAD clinic by Dr. Cisneros 12/8/2021. His amlodipine was increased. His inr goal was decreased to 1.8-2.5 d/t recurrent nose bleeding.    This visit  He lost 16 lbs during COVID. Was running around 204. Home weight yesterday was 207. He has been holding his bumex since Tuesday.     No SOB at rest. Since having having COVID he is having more MIRANDA. Going up a flight of stairs would make him feel MIRANDA. No orthopnea. No PND. No LE edema. No abdominal edema. No lightheadedness, dizziness, pre-syncope or syncope. No palpitaitons. No chest pain. Appetite is improving after covid.      No blood in the urine or blood in the stool. No prolonged nosebleeds.    No headaches. No stroke symptoms.    He has chronic driveline drainage. He saw Dr. Bhatti today. She took pictures and cultured it. He had some redness today as well. No pain around thedriveline. No fevers or chills.    No LVAD alarms.    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    SOCIAL HISTORY:  Social History     Socioeconomic History     Marital status:      Spouse name: Not on file     Number of children: Not on file     Years of education: Not on file     Highest education level: Not on file   Occupational History     Not on file   Social Needs     Financial resource strain: Not on file     Food insecurity     Worry: Not on file     Inability: Not on file     Transportation needs     Medical: Not on file     Non-medical: Not on file   Tobacco Use     Smoking status: Not on file   Substance and Sexual Activity     Alcohol use: Not on file     Drug use: Not on file     Sexual activity: Not on file   Lifestyle     Physical activity     Days per week: Not on file     Minutes per session: Not on file     Stress: Not on file   Relationships     Social connections     Talks on phone: Not on file     Gets together: Not on file     Attends Gnosticism service: Not on file     Active member of club or organization: Not on file     Attends meetings of clubs or organizations: Not on file     Relationship status: Not on file     Intimate partner violence     Fear of current or ex partner: Not on file     Emotionally abused: Not on file     Physically abused: Not on file     Forced sexual activity: Not on file   Other Topics Concern     Not on file   Social History Narrative     Not on file       CURRENT MEDICATIONS:  allopurinol (ZYLOPRIM) 100 MG tablet, 2 tablets (200 mg) by  "Oral or Feeding Tube route daily  amiodarone (PACERONE) 200 MG tablet, Take 1 tablet (200 mg) by mouth daily  aspirin (ASA) 81 MG EC tablet, Take 1 tablet (81 mg) by mouth daily  atorvastatin (LIPITOR) 20 MG tablet, Take 1 tablet (20 mg) by mouth daily  B Complex-C-Folic Acid (VIRT-CAPS) 1 MG CAPS, Take 1 capsule by mouth daily  cefadroxil (DURICEF) 500 MG capsule, Take 1 capsule (500 mg) by mouth 2 times daily  co-enzyme Q-10 200 MG CAPS, 200 mg by Oral or Feeding Tube route daily  finasteride (PROSCAR) 5 MG tablet, Take 1 tablet (5 mg) by mouth daily Helps with urinary retention.  FLUoxetine (PROZAC) 10 MG capsule, Take 30 mg by mouth daily  hydrALAZINE (APRESOLINE) 50 MG tablet, Take 2 tabs TID  insulin glargine (BASAGLAR KWIKPEN) 100 UNIT/ML pen, Inject 10 Units Subcutaneous At Bedtime   insulin pen needle (BD JAIME U/F) 32G X 4 MM miscellaneous,   magnesium oxide (MAG-OX) 400 MG tablet, 1 tablet (400 mg) by Oral or Feeding Tube route daily  nitroGLYcerin (NITROSTAT) 0.4 MG sublingual tablet, For chest pain place 1 tablet under the tongue every 5 minutes for 3 doses. If symptoms persist 5 minutes after 1st dose call 911.  omeprazole (PRILOSEC) 20 MG DR capsule, Take 20 mg by mouth daily  senna-docusate (SENOKOT-S/PERICOLACE) 8.6-50 MG tablet, Take 1 tablet by mouth 2 times daily as needed for constipation   tamsulosin (FLOMAX) 0.4 MG capsule, Take 1 capsule (0.4 mg) by mouth daily This med helps with urinary retention  warfarin ANTICOAGULANT (COUMADIN) 2 MG tablet, Take 4 mg by mouth daily   warfarin ANTICOAGULANT (COUMADIN) 4 MG tablet, TAKE 1 TO 2 TABLETS (4 MG TO 8 MG) BY MOUTH ONCE DAILY AS DIRECTED  zolpidem (AMBIEN) 5 MG tablet, Take 5 mg by mouth nightly as needed for Insomnia    [COMPLETED] lidocaine (LMX4) cream        ROS:   See HPI    EXAM:  BP (!) 118/0 (BP Location: Right arm, Patient Position: Chair, Cuff Size: Adult Regular)   Pulse 50   Ht 1.699 m (5' 6.89\")   Wt 97.1 kg (214 lb)   SpO2 97%   " BMI 33.63 kg/m      GENERAL: Appears comfortable, in no acute distress.   HEENT: Eye symmetrical, no discharge or icterus bilaterally. Mucous membranes moist and without lesions.  CV: Hum of HM3, occasional S1S2. JVP ~12.   RESPIRATORY: Respirations regular, even, and unlabored. Lungs CTA throughout.   GI: Soft, non distended with normoactive bowel sounds. No tenderness, rebound, guarding.   EXTREMITIES: Trace b/l peripheral edema. 2+ radial pulses  NEUROLOGIC: Alert and interacting appropriately. No focal deficits.   MUSCULOSKELETAL: No joint swelling or tenderness.   SKIN: No jaundice. No rashes or lesions. Driveline dressing c/d/i.      Labs - as reviewed in clinic with patient today:  CBC RESULTS:  Lab Results   Component Value Date    WBC 7.9 02/10/2022    WBC 7.9 03/19/2021    RBC 4.85 02/10/2022    RBC 3.88 03/19/2021    HGB 13.0 (L) 02/10/2022    HGB 13.1 (L) 02/10/2022    HGB 10.1 (A) 03/19/2021    HCT 41.4 02/10/2022    HCT 32.2 03/19/2021    MCV 85 02/10/2022    MCV 83.0 03/19/2021    MCH 27.0 02/10/2022    MCH 26.0 03/19/2021    MCHC 31.6 02/10/2022    MCHC 31.4 03/19/2021    RDW 17.6 (H) 02/10/2022    RDW 22.8 03/19/2021     02/10/2022     03/19/2021     03/17/2021       CMP RESULTS:  Lab Results   Component Value Date     02/10/2022     04/09/2021    POTASSIUM 4.2 02/10/2022    POTASSIUM 4.2 04/09/2021    CHLORIDE 108 02/10/2022    CHLORIDE 103 04/09/2021    CO2 21 02/10/2022    CO2 28 04/09/2021    ANIONGAP 9 02/10/2022    ANIONGAP 8 04/09/2021     (H) 02/10/2022     (A) 04/09/2021    BUN 31 (H) 02/10/2022    BUN 35 04/09/2021    CR 1.29 (H) 02/10/2022    CR 1.6 04/09/2021    GFRESTIMATED 60 (L) 02/10/2022    GFRESTIMATED 34 03/19/2021    GFRESTBLACK 39 (L) 03/17/2021    CALOS 8.4 (L) 02/10/2022    CALOS 9.0 04/09/2021    BILITOTAL 0.4 02/10/2022    BILITOTAL 0.5 03/19/2021    ALBUMIN 2.3 (L) 02/10/2022    ALBUMIN 4.4 03/19/2021    ALKPHOS 177 (H) 02/10/2022     ALKPHOS 138.0 03/19/2021    ALT 55 02/10/2022    ALT 35 03/19/2021    AST 54 (H) 02/10/2022    AST 60.0 03/19/2021        INR RESULTS:  Lab Results   Component Value Date    INR 1.7 (L) 02/10/2022    INR 1.87 (A) 01/20/2022    INR 2.1 12/09/2021    INR 3.1 (A) 07/09/2021       Lab Results   Component Value Date    MAG 1.5 (L) 03/17/2021     Lab Results   Component Value Date    NTBNPI 27,781 (H) 02/15/2021     Lab Results   Component Value Date    NTBNP 4,416 (H) 02/10/2022       Diagnostics    1/7/2021 RHC   Systolic (mmHg) Diastolic (mmHg) Mean (mmHg) A Wave (mmHg) V Wave (mmHg) EDP (mmHg) Max dp/dt (mmHg/sec) HR (bpm) Content (mL/dL) SAT (%)    RA Pressures  1:03 PM   5    4    9      84        RV Pressures  1:03 PM 24        5     84        PA Pressures  1:04 PM 24    7    13        88        PCW Pressures  1:04 PM   3        55          1/5/2021 ECHO    Interpretation Summary  Heartmate III LVAD, speed 5500 RPM     Left ventricular size is normal. Severely (EF 10-15%) reduced left ventricular  function is present.     LVAD cannula is not well seen in the LV apex. The outflow graft is well  visualized and Doppler is normal .     Mild to moderate right ventricular dilation is present. Global right  ventricular function is mildly to moderately reduced.     Aortic valve opens with every heart beat.     The inferior vena cava was normal in size with preserved respiratory  variability.     No pericardial effusion is present.      Assessment and Plan:   Eliseo Tanner is a 66 year old male with chronic systolic heart failure secondary to NICM now s/p HM 3 on 2/18, moderate CAD, HTN, ABHINAV on CPAP, DM2, CKD Stage III, ANA. His HM3 post-op course was complicated by retrosternal hematoma and bleeding in the lungs, RV failure,VT in ICU now on amiodarone and Afib w/AVR S/p DCCV on 2/28. He had pre-op proteus and enterococcus bacteremia and he has had MSSA bacteremia as well, s/p abx; later on had LDH elevation 2/2  to legionella c/b renal failure requiring temporary dialysis (off iHD since 3/10/2021).  He presents today for LVAD follow-up.    He had been holding his bumex since Tuesday for symptoms of hypervolemia. He had a RHC today which showed moderatly elevated filling pressures b/l, so we will restart bumex. After we get a few lbs of diuresis, we will decrease his matinance dose slightly.    He was also bradycardic today. His EKG revealed sinus bradycardia without significant blocks. His low rate on his pacemaker was increased from 40 bmp to 60 bmp and activity response was turned on. I did review the bradycardia and EKG with Dr. Cisneros, at this poin we will not decrease or stop amiodarone. He is not on other bradycardia inducing medications.    Chronic SCHF secondary to NICM s/p HM III with RV dysfunction. Implanted 2/18 and complicated by bleeding.   Stage D, NYHA Class IIIA  ACEi/ARB Hydralazine 100 mg TID. Increase amlodipine to 10 mg daily  BB Has been deferred given RHF, deferred d/t bradycardia now  Aldosterone antagonist deferred while other medical therapy is prioritized  SCD prophylaxis ICD  Fluid status Hypervolemic: Take Bumex 4 mg with Kcl 40 meq BID for 2 days, then decrease to Bumex 3 mg daily and kcl 40/20  MAP: Goal 65-85- note he has a strong radial pulse so we have a systolic over diastolic measurement. MAP is above goal today, but he also did not take any of his antihypertensives today. Increaseing amlodipine.  LDH: 325, stable  Anticoagulation: Coumadin per pharmacy.  Goal 1.8-2.5 for recurrent nosebleeding, INR 2.5  Antiplatelet: ASA 81 mg po daily    VAD Interrogation on 2/10/22. VAD interrogation reviewed with VAD coordinator. Agree with findings. History goes back 5 days. Occasional PI events. No power spikes, speed drops, or other findings suspicious of pump malfunction noted.      CKD stage IV  Baseline has increased over the last year. Cuttent B/l is around 1.8-2.2.   - Improved today to 1.3  -  Diuretic management as above  - Will defer to renal for further w/u    MSSA Driveline infection, ongoing drainage  - Patient saw Dr. Bhatti today, management per her and the ID team  - On Cefadroxil for now     A. Fib.  Sinus Bradycardia   History of Afib w/ RVR and aberrancy vs. NATHALIA vs. AT vs. Slow VT. S/p DCCV on 2/28 back into sinus rhythm. Has been tachycardic in the past but now in sinus bradycardia with increased RV pacing. Discussed wit Dr. Cisneros today as above  - Continue on amiodarone  - Increased lower pacer rate from 40 to 60 and turned on rate response    HTN. Note he has a strong radial pulse and has a systolic over diastolic BP measurement. He also did not take his BP medications today. MAP at home have been improved  - Increase amlodipine to 10 mg daily  - Continue hydralazine     History of HIT  - Avoid heparin products   Follow up   - Dr. Cisneros in 3 months in [place]  - ICD check and RHC today    Billing  - I reviewed 3+ tests  - I managed 2 stable chronic problems  - I changed a prescription medication      BIJAL Padron TAMAS

## 2022-02-10 NOTE — NURSING NOTE
"Chief Complaint   Patient presents with     RECHECK     follow up with wound     /84   Pulse (!) 45   Temp 98.6  F (37  C) (Oral)   Ht 1.702 m (5' 7\")   Wt 98.1 kg (216 lb 3.2 oz)   BMI 33.86 kg/m    Dara Weston CMA on 2/10/2022 at 8:07 AM    "

## 2022-02-10 NOTE — PROGRESS NOTES
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 Cisneros  Primary ID provider: Magy    History of bacteremias (dates and organisms):   - MSSA bacteremia secondary to MSSA driveline infection (11/2020).  Remains on cefadroxil suppression.  The amount of drainage waxes and wanes.  Currently the amount of drainage is decreased.  Will obtain culture today.    History of driveline infections (dates and organisms): see above    History of other pertinent infections:   -Severe Legionella pneumonia serogroup 1L severe Legionella pneumonia c/b cardiogenic shock, oliguric renal failure requiring CRRT and respiratory decompensation requiring intubation. Urine antigen positive, sputum PCR positive plus pulmonary opacities. s/p azithromycin (completed 2/25)     Current suppressive antibiotics:   -cefadroxil 500 mg BID    Transplant Plan: destination therapy     Recommendations:  1. Continue cefadroxil 500 mg twice a day-refills provided  2. They will let me know the trajectory of the drainage and if it increases again. If it does we may need to consider increasing the dose of cefadroxil.  3. Obtain LVAD exit site culture    Return to clinic 6 months    25 minutes spent on the date of the encounter doing chart review, review of test results, interpretation of tests, patient visit and documentation     Negar Bhatti DO  Infectious Diseases Attending  Pager [phone]     HPI:   Eliseo Tanner is a 66 year old male w/  chronic LVAD infection who presents for follow up. Last seen by me on 8/26/21.   Remains on cefadroxil 500 mg po BID.   Diagnosed with COVID 19 in January. He has recovered from it but still has some fatigue.   Wife has been changing the  LVAD dressing daily.  The amount of drainage is variable.  The color ranges from white to a brown tinge.  She has noted an occasional blood looking blister close to the driveline site that comes and goes.   Denies subjective fevers, chills, night sweats, nausea, vomiting, diarrhea.  He is tolerating cefadroxil well.    ROS: Remaining ROS is negative except as noted above.    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)      LVAD (left ventricular assist device) present (H)        Past Surgical History:  Past Surgical History:   Procedure Laterality Date     ANESTHESIA CARDIOVERSION N/A 2/28/2020    Procedure: ANESTHESIA, FOR CARDIOVERSION;  Surgeon: GENERIC ANESTHESIA PROVIDER;  Location: [facility] OR     CV CENTRAL VENOUS CATHETER PLACEMENT N/A 2/13/2020    Procedure: Central Venous Catheter Placement;  Surgeon: Chente Moss MD;  Location: [facility] HE[facility] CATH LAB     CV INTRA AORTIC BALLOON N/A 2/7/2020    Procedure: Intra-Aortic Balloon Pump Insertion;  Surgeon: Jose Baldwin MD;  Location: [facility] HE[facility]ARD[facility] CATH LAB     CV INTRA AORTIC BALLOON N/A 2/13/2020    Procedure: Intra-Aortic Balloon Pump Insertion;  Surgeon: Chente Moss MD;  Location: [facility] CATH LAB     CV RIGHT HEART CATH MEASUREMENTS RECORDED N/A 9/21/2020    Procedure: CV RIGHT HEART CATH;  Surgeon: Dalton Baeza MD;  Location: [facility]ATH LAB     CV RIGHT HEART CATH MEASUREMENTS RECORDED N/A 1/7/2021    Procedure: Right Heart Cath;   Surgeon: Chun Ball MD;  Location:  HEART CARDIAC CATH LAB     CV SWAN LUCIANA PROCEDURE N/A 2020    Procedure: Paxton Luciana Procedure;  Surgeon: Chente Moss MD;  Location:  HEART CARDIAC CATH LAB     EP ICD Bilateral 3/16/2020    Procedure: EP ICD;  Surgeon: Dali Day MD;  Location:  HEART CARDIAC CATH LAB     INSERT VENTRICULAR ASSIST DEVICE LEFT (HEARTMATE II) N/A 2020    Procedure: INSERTION, LEFT VENTRICULAR ASSIST DEVICE (HEARTMATE III);  Surgeon: Mac Jaramillo MD;  [facility]     IR CVC TUNNEL PLACEMENT > 5 YRS OF AGE  2021     IR CVC TUNNEL REMOVAL RIGHT  3/16/2021     THORACOSCOPY Right 3/6/2020    Procedure: RIGHT VIDEO-ASSISTED THORASCOPIC SURGERY, EVACUATION OF HEMOTHORAX, PLACEMENT OF CHEST TUBES;  Surgeon: William Gan MD;  Location:  OR       Social History:  Social History     Socioeconomic History     Marital status:      Spouse name: Not on file     Number of children: Not on file     Years of education: Not on file     Highest education level: Not on file   Occupational History     Not on file   Tobacco Use     Smoking status: Former Smoker     Quit date: 1994     Years since quittin.8     Smokeless tobacco: Never Used   Substance and Sexual Activity     Alcohol use: Not Currently     Drug use: Not Currently     Sexual activity: Not on file   Other Topics Concern     Not on file   Social History Narrative     Not on file     Social Determinants of Health     Financial Resource Strain: Not on file   Food Insecurity: Not on file   Transportation Needs: Not on file   Physical Activity: Not on file   Stress: Not on file   Social Connections: Not on file   Intimate Partner Violence: Not on file   Housing Stability: Not on file       Family Medical History:  No family history on file.    Allergies:     Allergies   Allergen Reactions     Heparin      HIT screen positive 20, reflex DAVINA negative; however heme  recommended treating as if positive  HIT screen negative 2/11/20     Oxycodone Itching and Other (See Comments)     Chlorhexidine Rash       Medications:  Current Outpatient Medications   Medication Sig Dispense Refill     allopurinol (ZYLOPRIM) 100 MG tablet 2 tablets (200 mg) by Oral or Feeding Tube route daily 60 tablet 1     amiodarone (PACERONE) 200 MG tablet Take 1 tablet (200 mg) by mouth daily 90 tablet 3     amLODIPine (NORVASC) 5 MG tablet Take 1.5 tablets (7.5 mg) by mouth daily 135 tablet 3     aspirin (ASA) 81 MG EC tablet Take 1 tablet (81 mg) by mouth daily 90 tablet 3     atorvastatin (LIPITOR) 20 MG tablet Take 1 tablet (20 mg) by mouth daily 90 tablet 3     B Complex-C-Folic Acid (VIRT-CAPS) 1 MG CAPS Take 1 capsule by mouth daily 100 capsule 3     bumetanide (BUMEX) 1 MG tablet Take 3 tablets (3 mg) by mouth daily 360 tablet 3     cefadroxil (DURICEF) 500 MG capsule Take 1 capsule (500 mg) by mouth 2 times daily 56 capsule 1     co-enzyme Q-10 200 MG CAPS 200 mg by Oral or Feeding Tube route daily       finasteride (PROSCAR) 5 MG tablet Take 1 tablet (5 mg) by mouth daily Helps with urinary retention. 30 tablet 0     FLUoxetine (PROZAC) 10 MG capsule Take 30 mg by mouth daily       hydrALAZINE (APRESOLINE) 50 MG tablet Take 2 tabs  tablet 3     insulin glargine (BASAGLAR KWIKPEN) 100 UNIT/ML pen Inject 10 Units Subcutaneous At Bedtime        insulin pen needle (BD JAIME U/F) 32G X 4 MM miscellaneous        magnesium oxide (MAG-OX) 400 MG tablet 1 tablet (400 mg) by Oral or Feeding Tube route daily 30 tablet 1     nitroGLYcerin (NITROSTAT) 0.4 MG sublingual tablet For chest pain place 1 tablet under the tongue every 5 minutes for 3 doses. If symptoms persist 5 minutes after 1st dose call 911. 30 tablet 0     omeprazole (PRILOSEC) 20 MG DR capsule Take 20 mg by mouth daily       potassium chloride ER (KLOR-CON M) 20 MEQ CR tablet Take 2 tablets (40 mEq) by mouth 2 times daily 120 tablet 1      "senna-docusate (SENOKOT-S/PERICOLACE) 8.6-50 MG tablet Take 1 tablet by mouth 2 times daily as needed for constipation (Patient not taking: Reported on 8/26/2021)       tamsulosin (FLOMAX) 0.4 MG capsule Take 1 capsule (0.4 mg) by mouth daily This med helps with urinary retention 30 capsule 0     warfarin ANTICOAGULANT (COUMADIN) 2 MG tablet Take 4 mg by mouth daily        warfarin ANTICOAGULANT (COUMADIN) 4 MG tablet TAKE 1 TO 2 TABLETS (4 MG TO 8 MG) BY MOUTH ONCE DAILY AS DIRECTED 90 tablet 3     zolpidem (AMBIEN) 5 MG tablet Take 5 mg by mouth nightly as needed for Insomnia         Immunizations:  Immunization History   Administered Date(s) Administered     COVID-19,TITA,Moderna 02/11/2021     COVID-19TITA,Pfizer (12+ Yrs) 02/11/2021, 09/01/2021, 09/22/2021     Flu, Unspecified 11/15/2019     Influenza (High Dose) 3 valent vaccine 10/25/2019     Pneumo Conj 13-V (2010&after) 09/06/2018       Exam:  /84   Pulse (!) 45   Temp 98.6  F (37  C) (Oral)   Ht 1.702 m (5' 7\")   Wt 98.1 kg (216 lb 3.2 oz)   BMI 33.86 kg/m    Gen: Alert and in no distress.   Psych: Normal affect.   HEENT: PERRL. No icterus.  CV: LVAD hum present  Chest: Clear to auscultation bilaterally without wheezes or crackles.   Abdomen: Soft, non-distended. Non-tender. Normal bowel sounds.   Extremities: Warm and well perfused.   Skin: No rashes or lesions noted.     Driveline exit site:        Labs:  WBC   Date Value Ref Range Status   03/19/2021 7.9 10^9/L Final     WBC Count   Date Value Ref Range Status   08/17/2021 6.0 4.0 - 11.0 10e3/uL Final       CRP Inflammation   Date Value Ref Range Status   08/17/2021 4.9 0.0 - 8.0 mg/L Final   02/16/2021 270.0 (H) 0.0 - 8.0 mg/L Final   02/15/2021 270.0 (H) 0.0 - 8.0 mg/L Final   02/11/2021 8.6 0.0 - 10.0 mg/L Final       Creatinine   Date Value Ref Range Status   08/17/2021 1.55 (H) 0.66 - 1.25 mg/dL Final   04/09/2021 1.6 mg/dL Final   03/19/2021 2.1 mg/dL Final   03/17/2021 2.00 (H) 0.66 - " 1.25 mg/dL Final

## 2022-02-11 ENCOUNTER — CARE COORDINATION (OUTPATIENT)
Dept: CARDIOLOGY | Facility: CLINIC | Age: 67
End: 2022-02-11
Payer: MEDICARE

## 2022-02-11 ENCOUNTER — TELEPHONE (OUTPATIENT)
Dept: CARDIOLOGY | Facility: CLINIC | Age: 67
End: 2022-02-11
Payer: MEDICARE

## 2022-02-11 VITALS
HEART RATE: 50 BPM | OXYGEN SATURATION: 97 % | SYSTOLIC BLOOD PRESSURE: 118 MMHG | BODY MASS INDEX: 33.59 KG/M2 | WEIGHT: 214 LBS | DIASTOLIC BLOOD PRESSURE: 90 MMHG | HEIGHT: 67 IN

## 2022-02-11 DIAGNOSIS — I50.22 CHRONIC SYSTOLIC CONGESTIVE HEART FAILURE (H): ICD-10-CM

## 2022-02-11 DIAGNOSIS — I42.8 NONISCHEMIC CARDIOMYOPATHY (H): ICD-10-CM

## 2022-02-11 LAB
ATRIAL RATE - MUSE: 49 BPM
DIASTOLIC BLOOD PRESSURE - MUSE: NORMAL MMHG
FIO2-PRE: 21 %
INTERPRETATION ECG - MUSE: NORMAL
MDC_IDC_EPISODE_DTM: NORMAL
MDC_IDC_EPISODE_DURATION: 480 S
MDC_IDC_EPISODE_ID: 235
MDC_IDC_EPISODE_TYPE: NORMAL
MDC_IDC_LEAD_IMPLANT_DT: NORMAL
MDC_IDC_LEAD_LOCATION: NORMAL
MDC_IDC_LEAD_LOCATION_DETAIL_1: NORMAL
MDC_IDC_LEAD_MFG: NORMAL
MDC_IDC_LEAD_MODEL: NORMAL
MDC_IDC_LEAD_POLARITY_TYPE: NORMAL
MDC_IDC_LEAD_SERIAL: NORMAL
MDC_IDC_LEAD_SPECIAL_FUNCTION: NORMAL
MDC_IDC_MSMT_BATTERY_DTM: NORMAL
MDC_IDC_MSMT_BATTERY_REMAINING_LONGEVITY: 124 MO
MDC_IDC_MSMT_BATTERY_RRT_TRIGGER: 2.73
MDC_IDC_MSMT_BATTERY_STATUS: NORMAL
MDC_IDC_MSMT_BATTERY_VOLTAGE: 3.01 V
MDC_IDC_MSMT_CAP_CHARGE_DTM: NORMAL
MDC_IDC_MSMT_CAP_CHARGE_ENERGY: 18 J
MDC_IDC_MSMT_CAP_CHARGE_TIME: 3.68
MDC_IDC_MSMT_CAP_CHARGE_TYPE: NORMAL
MDC_IDC_MSMT_LEADCHNL_RV_IMPEDANCE_VALUE: 399 OHM
MDC_IDC_MSMT_LEADCHNL_RV_PACING_THRESHOLD_AMPLITUDE: 0.5 V
MDC_IDC_MSMT_LEADCHNL_RV_PACING_THRESHOLD_PULSEWIDTH: 0.4 MS
MDC_IDC_MSMT_LEADCHNL_RV_SENSING_INTR_AMPL: 16.8 MV
MDC_IDC_PG_IMPLANT_DTM: NORMAL
MDC_IDC_PG_MFG: NORMAL
MDC_IDC_PG_MODEL: NORMAL
MDC_IDC_PG_SERIAL: NORMAL
MDC_IDC_PG_TYPE: NORMAL
MDC_IDC_SESS_CLINIC_NAME: NORMAL
MDC_IDC_SESS_DTM: NORMAL
MDC_IDC_SESS_TYPE: NORMAL
MDC_IDC_SET_BRADY_HYSTRATE: NORMAL
MDC_IDC_SET_BRADY_LOWRATE: 60 {BEATS}/MIN
MDC_IDC_SET_BRADY_MAX_SENSOR_RATE: 115 {BEATS}/MIN
MDC_IDC_SET_BRADY_MODE: NORMAL
MDC_IDC_SET_LEADCHNL_RV_PACING_AMPLITUDE: 2 V
MDC_IDC_SET_LEADCHNL_RV_PACING_ANODE_ELECTRODE_1: NORMAL
MDC_IDC_SET_LEADCHNL_RV_PACING_ANODE_LOCATION_1: NORMAL
MDC_IDC_SET_LEADCHNL_RV_PACING_CAPTURE_MODE: NORMAL
MDC_IDC_SET_LEADCHNL_RV_PACING_CATHODE_ELECTRODE_1: NORMAL
MDC_IDC_SET_LEADCHNL_RV_PACING_CATHODE_LOCATION_1: NORMAL
MDC_IDC_SET_LEADCHNL_RV_PACING_POLARITY: NORMAL
MDC_IDC_SET_LEADCHNL_RV_PACING_PULSEWIDTH: 0.4 MS
MDC_IDC_SET_LEADCHNL_RV_SENSING_ANODE_ELECTRODE_1: NORMAL
MDC_IDC_SET_LEADCHNL_RV_SENSING_ANODE_LOCATION_1: NORMAL
MDC_IDC_SET_LEADCHNL_RV_SENSING_CATHODE_ELECTRODE_1: NORMAL
MDC_IDC_SET_LEADCHNL_RV_SENSING_CATHODE_LOCATION_1: NORMAL
MDC_IDC_SET_LEADCHNL_RV_SENSING_POLARITY: NORMAL
MDC_IDC_SET_LEADCHNL_RV_SENSING_SENSITIVITY: 0.3 MV
MDC_IDC_SET_ZONE_DETECTION_BEATS_DENOMINATOR: 40 {BEATS}
MDC_IDC_SET_ZONE_DETECTION_BEATS_NUMERATOR: 30 {BEATS}
MDC_IDC_SET_ZONE_DETECTION_INTERVAL: 270 MS
MDC_IDC_SET_ZONE_DETECTION_INTERVAL: 460 MS
MDC_IDC_SET_ZONE_DETECTION_INTERVAL: 500 MS
MDC_IDC_SET_ZONE_DETECTION_INTERVAL: NORMAL
MDC_IDC_SET_ZONE_TYPE: NORMAL
MDC_IDC_STAT_AT_BURDEN_PERCENT: 0 %
MDC_IDC_STAT_AT_DTM_END: NORMAL
MDC_IDC_STAT_AT_DTM_START: NORMAL
MDC_IDC_STAT_BRADY_DTM_END: NORMAL
MDC_IDC_STAT_BRADY_DTM_START: NORMAL
MDC_IDC_STAT_BRADY_RV_PERCENT_PACED: 42.32 %
MDC_IDC_STAT_EPISODE_RECENT_COUNT: 0
MDC_IDC_STAT_EPISODE_RECENT_COUNT: 1
MDC_IDC_STAT_EPISODE_RECENT_COUNT_DTM_END: NORMAL
MDC_IDC_STAT_EPISODE_RECENT_COUNT_DTM_START: NORMAL
MDC_IDC_STAT_EPISODE_TOTAL_COUNT: 0
MDC_IDC_STAT_EPISODE_TOTAL_COUNT: 2
MDC_IDC_STAT_EPISODE_TOTAL_COUNT: 233
MDC_IDC_STAT_EPISODE_TOTAL_COUNT_DTM_END: NORMAL
MDC_IDC_STAT_EPISODE_TOTAL_COUNT_DTM_START: NORMAL
MDC_IDC_STAT_EPISODE_TYPE: NORMAL
MDC_IDC_STAT_TACHYTHERAPY_ATP_DELIVERED_RECENT: 0
MDC_IDC_STAT_TACHYTHERAPY_ATP_DELIVERED_TOTAL: 0
MDC_IDC_STAT_TACHYTHERAPY_RECENT_DTM_END: NORMAL
MDC_IDC_STAT_TACHYTHERAPY_RECENT_DTM_START: NORMAL
MDC_IDC_STAT_TACHYTHERAPY_SHOCKS_ABORTED_RECENT: 0
MDC_IDC_STAT_TACHYTHERAPY_SHOCKS_ABORTED_TOTAL: 0
MDC_IDC_STAT_TACHYTHERAPY_SHOCKS_DELIVERED_RECENT: 0
MDC_IDC_STAT_TACHYTHERAPY_SHOCKS_DELIVERED_TOTAL: 0
MDC_IDC_STAT_TACHYTHERAPY_TOTAL_DTM_END: NORMAL
MDC_IDC_STAT_TACHYTHERAPY_TOTAL_DTM_START: NORMAL
P AXIS - MUSE: 4 DEGREES
PR INTERVAL - MUSE: NORMAL MS
QRS DURATION - MUSE: 136 MS
QT - MUSE: 552 MS
QTC - MUSE: 498 MS
R AXIS - MUSE: -78 DEGREES
SYSTOLIC BLOOD PRESSURE - MUSE: NORMAL MMHG
T AXIS - MUSE: 89 DEGREES
VENTRICULAR RATE- MUSE: 49 BPM

## 2022-02-11 RX ORDER — BUMETANIDE 1 MG/1
3 TABLET ORAL DAILY
Qty: 270 TABLET | Refills: 3 | Status: SHIPPED | OUTPATIENT
Start: 2022-02-11 | End: 2022-02-23

## 2022-02-11 RX ORDER — POTASSIUM CHLORIDE 1500 MG/1
TABLET, EXTENDED RELEASE ORAL
Qty: 270 TABLET | Refills: 3 | Status: SHIPPED | OUTPATIENT
Start: 2022-02-11 | End: 2022-02-23

## 2022-02-11 NOTE — PROGRESS NOTES
Follow up to testing and appt's yesterday discussed with Karolina MATHIAS.    Date: 2/11/2022    Time of Call: 8:02 AM    [ TORB ] Ordering provider: Karolina MATHIAS  Orders:   Bumex 4 mg daily & Potassium 40 mEq BID X 2 days  Then  Bumex 3 mg daily & Potassium 40 mEq in the morning and 20 mEq in the afternoon  Repeat BMP in 1 week    Order received by: writer     Follow-up/additional notes: Pt advised via [email or web address].  Orders sent for BMP to [facility] lab.  Pt will call VAD coordinator with further needs and questions.

## 2022-02-11 NOTE — TELEPHONE ENCOUNTER
"----- Message from Sharon Mccarthy RN sent at 2/11/2022  7:20 AM CST -----  Yes.  We had to switch the f/up schedule due to equipment needs.    Thanks for clarifying with me   :)  ----- Message -----  From: Ana Beach  Sent: 2/11/2022   6:13 AM CST  To: Sharon Mccarthy RN    I\"m confused.. He saw pako yesterday and came out to my pod, he must've missed other appointments after her.     He lives far so is he going to just come down here then in May instead of [place] with Blayne ?  ----- Message -----  From: Sharon Mccarthy RN  Sent: 2/10/2022   3:19 PM CST  To: Ana Beach    So sorry for the inconvenience, but we need to switch the follow up requests for this patient.  Can you please schedule Eliseo with a VAD LISA or Dr. Cisneros, with PFT's (he didn't get to this today), labs prior, for May please?    Thank you,  Gladys  ----- Message -----  From: Ana Beach  Sent: 2/10/2022   9:41 AM CST  To: Sharon Mccarthy RN    I did send a message and postpone to schedule this in AUG     Follow-up: (make these appointments before you leave)  1. Please follow-up with VAD LISA in 12  months with labs prior    Needed in feb 2023          "

## 2022-02-11 NOTE — LETTER
Mechanical Circulatory Support Program  [address], [facility] [address] [place] [address]  [phone] Office Phone  [phone] Fax Number    Faxed to:  [facility]  Fax Number:  [phone]      Patient Name: Eliseo Tanner   : 1955   Diagnosis/ICD-10: Heart Failure, unspecified I50.9; LVAD Z95.811   Requesting Physician: Dr. Nader Cisneros   Date of Request: 2/10/22   Date to Draw Approximately 22                            Please fax results to [phone]       or email to [email or web address]                              Call [phone] with Questions  ORDER TEST   X Basic Metabolic Panel             Signed,      Nader Cisneros MD  Heart Failure, Mechanical Circulatory Support and Transplant Cardiology   of Medicine,  Division of Cardiology, [facility]

## 2022-02-14 ENCOUNTER — CARE COORDINATION (OUTPATIENT)
Dept: CARDIOLOGY | Facility: CLINIC | Age: 67
End: 2022-02-14
Payer: MEDICARE

## 2022-02-14 VITALS — BODY MASS INDEX: 32.06 KG/M2 | WEIGHT: 204 LBS

## 2022-02-14 LAB
BACTERIA SPEC CULT: ABNORMAL
PETH BLD-MCNC: NEGATIVE NG/ML

## 2022-02-14 NOTE — PROGRESS NOTES
D:  Pt called to report weight loss after taking Bumex 4 mg BID for 2 days.  Pt went from 207 to 202.  Sunday, patient only took 1 mg Bumex and weight today 204.  Pt denies dizzy spells and is asymptomatic.  Current LVAD numbers, Speed: 5500, Flow: 4.4, PI: 4.3, Power: 4.8, which are stable.  I:  Discussed situation with Karolina MATHIAS.  Plan: provider instructs pt to take Bumex and potassium as previously prescribed.  Pt reports frustration with having to void every 30 minutes.  Explained that the frequency could possibly be addressed by his primary MD, but that he needs the diuretics for his heart.  Pt instructed to have labs drawn either in 1 week or once he hits 200 lbs, which ever one comes first.  Pt advised.  A:  Weight loss  P:  Pt verbalized understanding of the instructions given.  Will call VAD coordinator with further needs and questions.

## 2022-02-15 ENCOUNTER — VIRTUAL VISIT (OUTPATIENT)
Dept: PALLIATIVE CARE | Facility: CLINIC | Age: 67
End: 2022-02-15
Attending: INTERNAL MEDICINE
Payer: MEDICARE

## 2022-02-15 DIAGNOSIS — Z71.89 ADVANCE CARE PLANNING: ICD-10-CM

## 2022-02-15 DIAGNOSIS — I50.22 CHRONIC SYSTOLIC CONGESTIVE HEART FAILURE (H): Primary | ICD-10-CM

## 2022-02-15 DIAGNOSIS — Z95.811 LVAD (LEFT VENTRICULAR ASSIST DEVICE) PRESENT (H): ICD-10-CM

## 2022-02-15 PROBLEM — D50.9 IRON DEFICIENCY ANEMIA, UNSPECIFIED IRON DEFICIENCY ANEMIA TYPE: Status: ACTIVE | Noted: 2022-02-15

## 2022-02-15 PROCEDURE — 99213 OFFICE O/P EST LOW 20 MIN: CPT | Mod: 95 | Performed by: INTERNAL MEDICINE

## 2022-02-15 NOTE — LETTER
2/15/2022       RE: Eliseo Tanner  [address] [facility] [address]     Dear Colleague,    Thank you for referring your patient, Eliseo Tanner, to the [facility] at [facility]. Please see a copy of my visit note below.    Eliseo is a 66 year old who is being evaluated via a billable video visit.      How would you like to obtain your AVS? MyChart  If the video visit is dropped, the invitation should be resent by: Text to cell phone: [phone]  Will anyone else be joining your video visit? No        Palliative Care Outpatient Clinic      Patient ID:  Medical - He has NICM who is s/p LVAD HM3 2/2020. Our inpatient team met him there for a routine preop palliative assessment/care planning discussion (2/11/2020). 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 CKD.    Social - Lives with wife who is his caregiver.    Care Planning -       History:  History gathered today from: patient, medical chart  Chart reviewed: ID following his driveline infection with PO abx. Heart team adjusting diuretics.    He does not know why he is here to see me or what palliative care is.    He reports life with the LVAD is good. Managing the driveline infection without major issues. Really glad he got the LVAD and thinks his qol is quite reasonable on it. No major complications.     Reviewed our role in care planning. He notes he really wouldn't want to talk about any of that without his wife present, and she is working today. He does not have a HCD and didn't know what one was when I told him. We met him prior to LVAD implantation, I think without his wife prsent too, and I note that he was not really able to engage in any care planning discussion at that time either. He would want his wife to be his decision maker he confirms.       PE: There were no vitals taken for this visit.   Wt Readings from Last 3 Encounters:   02/14/22 92.5 kg  (204 lb)   02/10/22 97.1 kg (214 lb)   02/10/22 98.1 kg (216 lb 3.2 oz)     Alert NAD  Pleasant interactive      Data reviewed:  I reviewed recent labs and imaging, my comments:  Cr 1.29  AlkP 177    RHC this month showed mod pulm HTN and L sided pressures, normal CO     database reviewed: n    Impression & Recommendations:  67 yo man with NICM s/p HM3 LVAD  As above, he wasn't interested in talking; clinically doing well overall; I wished him well.     28 minutes spent on the date of the encounter doing chart review, history and exam, patient education & counseling, documentation and other activities as noted above.    Thank you for involving us in the patient's care.   Alcon Marina MD / Palliative Medicine / Text me via [employer].    Video Start Time: 310p  Video-Visit Details    Type of service:  Video Visit    Video End Time:320p    Originating Location (pt. Location): Home    Distant Location (provider location): Home    Platform used for Video Visit: Mary [employer]

## 2022-02-15 NOTE — PROGRESS NOTES
Eliseo is a 66 year old who is being evaluated via a billable video visit.      How would you like to obtain your AVS? MyChart  If the video visit is dropped, the invitation should be resent by: Text to cell phone: [phone]  Will anyone else be joining your video visit? No        Palliative Care Outpatient Clinic      Patient ID:  Medical - He has NICM who is s/p LVAD HM3 2/2020. Our inpatient team met him there for a routine preop palliative assessment/care planning discussion (2/11/2020). 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 CKD.    Social - Lives with wife who is his caregiver.    Care Planning -       History:  History gathered today from: patient, medical chart  Chart reviewed: ID following his driveline infection with PO abx. Heart team adjusting diuretics.    He does not know why he is here to see me or what palliative care is.    He reports life with the LVAD is good. Managing the driveline infection without major issues. Really glad he got the LVAD and thinks his qol is quite reasonable on it. No major complications.     Reviewed our role in care planning. He notes he really wouldn't want to talk about any of that without his wife present, and she is working today. He does not have a HCD and didn't know what one was when I told him. We met him prior to LVAD implantation, I think without his wife prsent too, and I note that he was not really able to engage in any care planning discussion at that time either. He would want his wife to be his decision maker he confirms.       PE: There were no vitals taken for this visit.   Wt Readings from Last 3 Encounters:   02/14/22 92.5 kg (204 lb)   02/10/22 97.1 kg (214 lb)   02/10/22 98.1 kg (216 lb 3.2 oz)     Alert NAD  Pleasant interactive      Data reviewed:  I reviewed recent labs and imaging, my comments:  Cr 1.29  AlkP 177    RHC this month showed mod pulm HTN and L sided pressures, normal CO     database reviewed:  n      Impression & Recommendations:  67 yo man with NICM s/p HM3 LVAD  As above, he wasn't interested in talking; clinically doing well overall; I wished him well.     28 minutes spent on the date of the encounter doing chart review, history and exam, patient education & counseling, documentation and other activities as noted above.    Thank you for involving us in the patient's care.   Alcon Marina MD / Palliative Medicine / Text me via [facility].      Video Start Time: 310p  Video-Visit Details    Type of service:  Video Visit    Video End Time:320p    Originating Location (pt. Location): Home    Distant Location (provider location): Home    Platform used for Video Visit: Mary Interiano

## 2022-02-17 LAB — BACTERIA SPEC CULT: ABNORMAL

## 2022-02-18 ENCOUNTER — TRANSFERRED RECORDS (OUTPATIENT)
Dept: HEALTH INFORMATION MANAGEMENT | Facility: CLINIC | Age: 67
End: 2022-02-18
Payer: MEDICARE

## 2022-02-18 LAB — INR (EXTERNAL): 1.65 (ref 2–3.5)

## 2022-02-21 ENCOUNTER — ANTICOAGULATION THERAPY VISIT (OUTPATIENT)
Dept: ANTICOAGULATION | Facility: CLINIC | Age: 67
End: 2022-02-21
Payer: MEDICARE

## 2022-02-21 DIAGNOSIS — I50.22 CHRONIC SYSTOLIC CONGESTIVE HEART FAILURE (H): ICD-10-CM

## 2022-02-21 DIAGNOSIS — Z95.811 LVAD (LEFT VENTRICULAR ASSIST DEVICE) PRESENT (H): Primary | ICD-10-CM

## 2022-02-21 DIAGNOSIS — I48.0 PAROXYSMAL ATRIAL FIBRILLATION (H): ICD-10-CM

## 2022-02-21 NOTE — PROGRESS NOTES
"ANTICOAGULATION MANAGEMENT     Eliseo Tanner 66 year old male is on warfarin with subtherapeutic INR result. (Goal INR 1.8 - 2.3).  [facility] called lab and got this result on 2/21/22:    Recent labs: (last 7 days)     02/18/22  0000   INR 1.65*       ASSESSMENT     Source(s): Chart Review and Patient/Caregiver Call       Warfarin doses taken: Warfarin taken as instructed    Diet: No new diet changes identified    New illness, injury, or hospitalization: No    Medication/supplement changes: Eliseo reports starting a new med \"for urination\", on 2/19/22,he does not know the name of it.      Signs or symptoms of bleeding or clotting: No    Previous INR: Subtherapeutic    Additional findings: None     PLAN     Recommended plan for no diet, medication or health factor changes affecting INR     Dosing Instructions:  Increase your warfarin dose (9.1% change) with next INR in 1 week       Summary  As of 2/21/2022    Full warfarin instructions:  2 mg every Wed, Sat; 4 mg all other days   Next INR check:  2/25/2022             Telephone call with Eliseo who agrees to plan and repeated back plan correctly    Patient using outside facility for labs    Education provided: Please call back if any changes to your diet, medications or how you've been taking warfarin and Contact [phone] with any changes, questions or concerns.     Plan made per [facility] anticoagulation protocol and per LVAD protocol    Krysta Mcdaniel RN  Anticoagulation Clinic  2/21/2022    _______________________________________________________________________     Anticoagulation Episode Summary     Current INR goal:     TTR:  66.5 % (11.2 mo)   Target end date:  Indefinite   Send INR reminders to:  ANTICOAG LVAD    Indications    LVAD (left ventricular assist device) present (H) [Z95.811]  Chronic systolic congestive heart failure (H) [I50.22]  Paroxysmal atrial fibrillation (H) [I48.0]           Comments:  INR GOAL RANGE CHANGE 8/17/21 1.8-2.3 On Amiodarone " 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 HM 3  placed 2/18/2020, 81mg ASA, Speak to spouse, Veronique at [phone] 4/14/2020:  Lab: [facility]:(p)[phone] (f) [phone]         Anticoagulation Care Providers     Provider Role Specialty Phone number    Nader Cisneros MD Referring Cardiovascular Disease [phone]

## 2022-02-23 ENCOUNTER — CARE COORDINATION (OUTPATIENT)
Dept: CARDIOLOGY | Facility: CLINIC | Age: 67
End: 2022-02-23
Payer: MEDICARE

## 2022-02-23 DIAGNOSIS — I42.8 NONISCHEMIC CARDIOMYOPATHY (H): ICD-10-CM

## 2022-02-23 DIAGNOSIS — I50.22 CHRONIC SYSTOLIC CONGESTIVE HEART FAILURE (H): ICD-10-CM

## 2022-02-23 RX ORDER — BUMETANIDE 1 MG/1
4 TABLET ORAL DAILY
Qty: 360 TABLET | Refills: 3 | Status: SHIPPED | OUTPATIENT
Start: 2022-02-23 | End: 2022-03-08

## 2022-02-23 RX ORDER — POTASSIUM CHLORIDE 1500 MG/1
TABLET, EXTENDED RELEASE ORAL
Qty: 360 TABLET | Refills: 3 | Status: SHIPPED | OUTPATIENT
Start: 2022-02-23 | End: 2022-03-08

## 2022-02-23 NOTE — PROGRESS NOTES
D:  Called pt to check in in relation to last conversation.  Weights trending upward: 210 today, 207 yesterday, and 205 & 205 prior. No changes to LVAD parameters.  Pt reports feeling well.  Labs drawn Friday 2/18. (Labs were not faxed to us)  Pt has been taking Bumex 3 mg daily.  I:  Called [facility] lab for results.  Discussed situation with Karolina MATHIAS.  Plan: pt to take and EXTRA 2 mg of bumex today, with an EXTRA 20 meq of Potassium, then INCREASE bumex to 4 mg daily with Potassium 40 meq BID as permanent dose change. Call if weight goes above 212lbs.  BMP in 1 week.  Pt advised.  A:  Follow up  P:  Pt verbalized understanding of the instructions given.  Will call VAD coordinator with further needs and questions.

## 2022-02-23 NOTE — LETTER
Faxed to:  [facility]  Fax Number:  [phone]                                                                   STANDING ***      Patient Name: Eliseo Tanner    1955   Diagnosis/ICD-9: Heart Failure, unspecified I50.9; LVAD Z95.811   Requesting Physician: Dr. Nader Cisneros   Date of Request: 22     Date ORDERS   22 Standing order for BMP draws.   Quantity: 100  Expires in 1 year  Please fax results to [phone]   For questions please call [phone]        Signed,      Nader Cisneros MD  Heart Failure, Mechanical Circulatory Support and Transplant Cardiology   of Medicine,  Division of Cardiology, [facility]

## 2022-02-25 ENCOUNTER — CARE COORDINATION (OUTPATIENT)
Dept: CARDIOLOGY | Facility: CLINIC | Age: 67
End: 2022-02-25
Payer: MEDICARE

## 2022-02-25 ENCOUNTER — TRANSFERRED RECORDS (OUTPATIENT)
Dept: HEALTH INFORMATION MANAGEMENT | Facility: CLINIC | Age: 67
End: 2022-02-25
Payer: MEDICARE

## 2022-02-25 ENCOUNTER — ANTICOAGULATION THERAPY VISIT (OUTPATIENT)
Dept: ANTICOAGULATION | Facility: CLINIC | Age: 67
End: 2022-02-25
Payer: MEDICARE

## 2022-02-25 DIAGNOSIS — I48.0 PAROXYSMAL ATRIAL FIBRILLATION (H): ICD-10-CM

## 2022-02-25 DIAGNOSIS — I50.22 CHRONIC SYSTOLIC CONGESTIVE HEART FAILURE (H): ICD-10-CM

## 2022-02-25 DIAGNOSIS — Z95.811 LVAD (LEFT VENTRICULAR ASSIST DEVICE) PRESENT (H): Primary | ICD-10-CM

## 2022-02-25 LAB — INR (EXTERNAL): 1.77 (ref 2–3.5)

## 2022-02-25 NOTE — PROGRESS NOTES
ANTICOAGULATION MANAGEMENT     Eliseo Tanner 66 year old male is on warfarin with subtherapeutic INR result. (Goal INR 1.8-2.3)    Recent labs: (last 7 days)     02/25/22  0901   INR 1.77*       ASSESSMENT     Source(s): Chart Review and Patient/Caregiver Call       Warfarin doses taken: Warfarin taken as instructed    Diet: No new diet changes identified    New illness, injury, or hospitalization: No    Medication/supplement changes: Increase in Bumex dose    Signs or symptoms of bleeding or clotting: No    Previous INR: Subtherapeutic    Additional findings: None     PLAN     Recommended plan for ongoing change(s) affecting INR     Dosing Instructions: Continue your current warfarin dose with next INR in 1 week       Summary  As of 2/25/2022    Full warfarin instructions:  2 mg every Wed, Sat; 4 mg all other days   Next INR check:  3/4/2022             Telephone call with Eliseo who verbalizes understanding and agrees to plan and who agrees to plan and repeated back plan correctly    Patient using outside facility for labs    Education provided: Goal range and significance of current result and Contact [phone] with any changes, questions or concerns.     Plan made with [facility] Pharmacist Tamiko Rosas and per LVAD protocol    PACO MARQUEZ RN  Anticoagulation Clinic  2/25/2022    _______________________________________________________________________     Anticoagulation Episode Summary     Current INR goal:     TTR:  65.0 % (11.4 mo)   Target end date:  Indefinite   Send INR reminders to:  ANTICOAG LVAD    Indications    LVAD (left ventricular assist device) present (H) [Z95.811]  Chronic systolic congestive heart failure (H) [I50.22]  Paroxysmal atrial fibrillation (H) [I48.0]           Comments:  INR GOAL RANGE CHANGE 8/17/21 1.8-2.3 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 HM 3  placed 2/18/2020, 81mg ASA, Speak to spouse, Veronique at [phone] 4/14/2020:  Lab: [facility]:(p)[phone] (f) [phone]         Anticoagulation Care Providers     Provider Role Specialty Phone number    Nader Cisneros MD Referring Cardiovascular Disease [phone]

## 2022-02-25 NOTE — PROGRESS NOTES
Date: 2/25/2022    Time of Call: 11:24 AM     [ TORB ] Ordering provider: Karolina MATHIAS>  Order: OK for Primary care MD to follow and treat anemia.     Order received by: writer     Follow-up/additional notes: Called patient, primary care MD's- RN ([phone]), and [facility] infusion pharmacy - [facility] ([phone]) to inform them all of the above.  All verbalized understanding of plan going forward.

## 2022-02-25 NOTE — PROGRESS NOTES
Patient called and reported infusion center called stating they have received two different iron infusion set. One order set from LISA Aguilar and the other order set from Dr. Steele, PCP. [facility] and Gladys Mccarthy, primary VAD coordinator notified. Message sent to Karolina Decker regarding plan of care.     10:53 AM  Patient updated on current status of request: Waiting to hear back from LISA Aguilar on plan.     PLAN:

## 2022-03-04 ENCOUNTER — TRANSFERRED RECORDS (OUTPATIENT)
Dept: HEALTH INFORMATION MANAGEMENT | Facility: CLINIC | Age: 67
End: 2022-03-04
Payer: MEDICARE

## 2022-03-04 ENCOUNTER — ANTICOAGULATION THERAPY VISIT (OUTPATIENT)
Dept: ANTICOAGULATION | Facility: CLINIC | Age: 67
End: 2022-03-04
Payer: MEDICARE

## 2022-03-04 DIAGNOSIS — I48.0 PAROXYSMAL ATRIAL FIBRILLATION (H): ICD-10-CM

## 2022-03-04 DIAGNOSIS — I50.22 CHRONIC SYSTOLIC CONGESTIVE HEART FAILURE (H): ICD-10-CM

## 2022-03-04 DIAGNOSIS — Z95.811 LVAD (LEFT VENTRICULAR ASSIST DEVICE) PRESENT (H): Primary | ICD-10-CM

## 2022-03-04 LAB — INR (EXTERNAL): 1.6 (ref 0.9–1.1)

## 2022-03-04 NOTE — PROGRESS NOTES
ANTICOAGULATION MANAGEMENT     Eliseo Tanner 66 year old male is on warfarin with subtherapeutic INR result. (Goal INR 1.8-2.3)    Recent labs: (last 7 days)     03/04/22  0000   INR 1.6*       ASSESSMENT       Source(s): Patient/Caregiver Call       Warfarin doses taken: Missed dose(s) may be affecting INR    Diet: Increased greens/vitamin K in diet; plans to resume previous intake    New illness, injury, or hospitalization: No    Medication/supplement changes: None noted    Signs or symptoms of bleeding or clotting: No    Previous INR: Subtherapeutic    Additional findings: None       PLAN     Recommended plan for temporary change(s) and ongoing change(s) affecting INR     Dosing Instructions:  Increase your warfarin dose (8.3% change) with next INR in 1 week       Summary  As of 3/4/2022    Full warfarin instructions:  2 mg every Wed; 4 mg all other days   Next INR check:  3/11/2022             Telephone call with Eliseo who verbalizes understanding and agrees to plan and who agrees to plan and repeated back plan correctly    Patient using outside facility for labs    Education provided: Importance of consistent vitamin K intake, Impact of vitamin K foods on INR and Vitamin K content of foods    Plan made with [facility] Pharmacist Tamiko Rosas and per LVAD protocol    Luiza Perkins, RN  Anticoagulation Clinic  3/4/2022    _______________________________________________________________________     Anticoagulation Episode Summary     Current INR goal:     TTR:  63.8 % (11.7 mo)   Target end date:  Indefinite   Send INR reminders to:  ANTICOAG LVAD    Indications    LVAD (left ventricular assist device) present (H) [Z95.811]  Chronic systolic congestive heart failure (H) [I50.22]  Paroxysmal atrial fibrillation (H) [I48.0]           Comments:  INR GOAL RANGE CHANGE 8/17/21 1.8-2.3 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 HM 3  placed 2/18/2020, 81mg ASA, Speak to spouse, Veronique at  [phone] 4/14/2020:  [facility]: [facility]:(p)[phone] (f) [phone]         Anticoagulation Care Providers     Provider Role Specialty Phone number    Nader Cisneros MD Referring Cardiovascular Disease [phone]

## 2022-03-07 LAB
ANION GAP SERPL CALC-SCNC: 15 MMOL/L
BUN SERPL-MCNC: 31 MG/DL
CALCIUM (EXTERNAL): 8.6 MG/DL
CHLORIDE (EXTERNAL): 104 MMOL/L
CO2 (EXTERNAL): 22 MMOL/L
CREATININE (EXTERNAL): 1.6 MG/DL
GFR ESTIMATED (EXTERNAL): 43 ML/MIN/1.73M2
GFR ESTIMATED (IF AFRICAN AMERICAN) (EXTERNAL): 52 ML/MIN/1.73M2
GLUCOSE (EXTERNAL): 240 MG/DL (ref 70–99)
POTASSIUM (EXTERNAL): 3.9 MMOL/L
SODIUM (EXTERNAL): 137 MMOL/L

## 2022-03-08 ENCOUNTER — CARE COORDINATION (OUTPATIENT)
Dept: CARDIOLOGY | Facility: CLINIC | Age: 67
End: 2022-03-08
Payer: MEDICARE

## 2022-03-08 VITALS — BODY MASS INDEX: 33.94 KG/M2 | WEIGHT: 216 LBS

## 2022-03-08 DIAGNOSIS — R39.198 VOIDING DIFFICULTY: ICD-10-CM

## 2022-03-08 DIAGNOSIS — I50.22 CHRONIC SYSTOLIC CONGESTIVE HEART FAILURE (H): ICD-10-CM

## 2022-03-08 DIAGNOSIS — I42.8 NONISCHEMIC CARDIOMYOPATHY (H): ICD-10-CM

## 2022-03-08 RX ORDER — BUMETANIDE 1 MG/1
3 TABLET ORAL 2 TIMES DAILY
Qty: 540 TABLET | Refills: 3 | Status: SHIPPED | OUTPATIENT
Start: 2022-03-08 | End: 2022-03-15

## 2022-03-08 RX ORDER — TAMSULOSIN HYDROCHLORIDE 0.4 MG/1
0.8 CAPSULE ORAL EVERY EVENING
Qty: 30 CAPSULE | Refills: 0 | COMMUNITY
Start: 2022-03-08

## 2022-03-08 RX ORDER — POTASSIUM CHLORIDE 1500 MG/1
TABLET, EXTENDED RELEASE ORAL
Qty: 450 TABLET | Refills: 3 | Status: SHIPPED | OUTPATIENT
Start: 2022-03-08 | End: 2022-03-15

## 2022-03-08 NOTE — PROGRESS NOTES
Addendum 3/8/22 Revisited dosing recommendation and changed maintenance dose back to 2mg WSa and 4mg all other days per pt having temporary intake of vitamin k rich foods per 3/4 telephone conversation. Will keep recheck date at 3/11. Luiza Perkins RN     Addendum 3/8/22 Pt calls reporting he is unable to get an INR this Friday and will get an INR on Monday 3/14. Writer let pt know to go back to previous maintenance dose of 2mg WSa and 4mg all other days. Pt communicated understanding. Updated calendar. Luiza Perkins RN

## 2022-03-11 NOTE — PROGRESS NOTES
Friday addendum:  Pt weight yesterday 213, today 212.  Discussed with Karolina MATHIAS.    Plan: Continue with current Bumex dosing and recheck BMP Monday/Tuesday next week.  Pt advised via phone.  Pt verbalized understanding of the instructions given.  Will call VAD coordinator with further needs and questions.

## 2022-03-14 ENCOUNTER — ANTICOAGULATION THERAPY VISIT (OUTPATIENT)
Dept: ANTICOAGULATION | Facility: CLINIC | Age: 67
End: 2022-03-14
Payer: MEDICARE

## 2022-03-14 DIAGNOSIS — I48.0 PAROXYSMAL ATRIAL FIBRILLATION (H): ICD-10-CM

## 2022-03-14 DIAGNOSIS — Z95.811 LVAD (LEFT VENTRICULAR ASSIST DEVICE) PRESENT (H): Primary | ICD-10-CM

## 2022-03-14 DIAGNOSIS — I50.22 CHRONIC SYSTOLIC CONGESTIVE HEART FAILURE (H): ICD-10-CM

## 2022-03-14 NOTE — PROGRESS NOTES
Addendum 3/18/22  Patient calls and requests that INR check is moved to 3/25/22. [facility]              ANTICOAGULATION MANAGEMENT     Eliseo Tanner 66 year old male is on warfarin with therapeutic INR result. (Goal INR The patient does not have an INR goal.)    Recent labs: (last 7 days)     03/14/22  0944   INR 1.7*       ASSESSMENT       Source(s): Patient/Caregiver Call       Warfarin doses taken: Warfarin taken as instructed    Diet: No new diet changes identified    New illness, injury, or hospitalization: No    Medication/supplement changes: None noted    Signs or symptoms of bleeding or clotting: No    Previous INR: Subtherapeutic    Additional findings: Denies eating any vitamin k rich foods or any missed doses        PLAN     Recommended plan for no diet, medication or health factor changes affecting INR     Dosing Instructions:  Increase your warfarin dose (8.3% change) with next INR in 1 week       Summary  As of 3/14/2022    Full warfarin instructions:  2 mg every Wed; 4 mg all other days   Next INR check:  3/21/2022             Telephone call with Eliseo who verbalizes understanding and agrees to plan and who agrees to plan and repeated back plan correctly    Patient using outside facility for labs    Education provided: None required    Plan made with [facility] Pharmacist Sraah Gaffney and per LVAD protocol    Luiza Perkins, RN  Anticoagulation Clinic  3/14/2022    _______________________________________________________________________     Anticoagulation Episode Summary     Current INR goal:     TTR:  62.0 % (1 y)   Target end date:  Indefinite   Send INR reminders to:  ANTICOAG LVAD    Indications    LVAD (left ventricular assist device) present (H) [Z95.811]  Chronic systolic congestive heart failure (H) [I50.22]  Paroxysmal atrial fibrillation (H) [I48.0]           Comments:  INR GOAL RANGE CHANGE 8/17/21 1.8-2.3 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  3  placed 2/18/2020,  81mg ASA, Speak to spouse, Veronique at [phone] 4/14/2020:  [facility]: [facility]:(p)[phone] (f) [phone]         Anticoagulation Care Providers     Provider Role Specialty Phone number    Nader Cisneros MD Referring Cardiovascular Disease [phone]

## 2022-03-15 ENCOUNTER — CARE COORDINATION (OUTPATIENT)
Dept: CARDIOLOGY | Facility: CLINIC | Age: 67
End: 2022-03-15
Payer: MEDICARE

## 2022-03-15 DIAGNOSIS — I42.8 NONISCHEMIC CARDIOMYOPATHY (H): ICD-10-CM

## 2022-03-15 DIAGNOSIS — I50.22 CHRONIC SYSTOLIC CONGESTIVE HEART FAILURE (H): ICD-10-CM

## 2022-03-15 RX ORDER — BUMETANIDE 1 MG/1
TABLET ORAL
Qty: 450 TABLET | Refills: 3 | Status: SHIPPED | OUTPATIENT
Start: 2022-03-15 | End: 2022-04-01

## 2022-03-15 RX ORDER — POTASSIUM CHLORIDE 1500 MG/1
TABLET, EXTENDED RELEASE ORAL
Qty: 360 TABLET | Refills: 3 | Status: SHIPPED | OUTPATIENT
Start: 2022-03-15 | End: 2022-04-01

## 2022-03-15 NOTE — PROGRESS NOTES
D:  Pt called with weight of 209 today (down from 212 last week) Current LVAD numbers, Speed: 5500, Flow: 4.6, PI: 3.1, Power: 4.6.  PI is on the lower side for pt, but out of parameters.  Rcvd follow up labs from yesterday.  Pt reports only mild dizziness, first thing in the morning, when he first gets out of bed, which resolves on its own.  I:  Reviewed situation and results with Karolina MATHIAS.  Plan: decrease Bumex to 3 mg in AM and 2 mg in the afternoon and decrease Potassium to 40 meq bid.  Repeat BMP in 10-14 days.  Pt advised.  A:  Follow up  P:  Pt verbalized understanding of the instructions given.  Will call VAD coordinator with further needs and questions.

## 2022-03-25 ENCOUNTER — ANTICOAGULATION THERAPY VISIT (OUTPATIENT)
Dept: ANTICOAGULATION | Facility: CLINIC | Age: 67
End: 2022-03-25
Payer: MEDICARE

## 2022-03-25 DIAGNOSIS — Z95.811 LVAD (LEFT VENTRICULAR ASSIST DEVICE) PRESENT (H): Primary | ICD-10-CM

## 2022-03-25 DIAGNOSIS — I50.22 CHRONIC SYSTOLIC CONGESTIVE HEART FAILURE (H): ICD-10-CM

## 2022-03-25 DIAGNOSIS — I48.0 PAROXYSMAL ATRIAL FIBRILLATION (H): ICD-10-CM

## 2022-03-25 NOTE — PROGRESS NOTES
ANTICOAGULATION MANAGEMENT     Eliseo Tanner 66 year old male is on warfarin with supratherapeutic INR result. (Goal INR 1.8-2.3)    Recent labs: (last 7 days)     03/25/22  0940   INR 2.4*       ASSESSMENT       Source(s): Chart Review and Patient/Caregiver Call       Warfarin doses taken: Warfarin taken as instructed    Diet: Protein supplement/shake decreased which maybe affecting INR    New illness, injury, or hospitalization: No    Medication/supplement changes: None noted    Signs or symptoms of bleeding or clotting: No    Previous INR: Subtherapeutic    Additional findings: No change to dose of Warfarin this week, patient will drink a Boost tonight, then go back to 2-3 boost drinks/week.       PLAN     Recommended plan for temporary change(s) affecting INR     Dosing Instructions: Continue your current warfarin dose with next INR in 1 week       Summary  As of 3/25/2022    Full warfarin instructions:  2 mg every Wed; 4 mg all other days   Next INR check:  4/1/2022             Telephone call with  laci who agrees to plan and repeated back plan correctly    Patient using outside facility for labs    Education provided: Please call back if any changes to your diet, medications or how you've been taking warfarin, Vitamin K content of foods, Monitoring for bleeding signs and symptoms and Monitoring for clotting signs and symptoms    Plan made per [facility] anticoagulation protocol and per LVAD protocol    Young Bustos, RN  Anticoagulation Clinic  3/25/2022    _______________________________________________________________________     Anticoagulation Episode Summary     Current INR goal:     TTR:  61.2 % (1 y)   Target end date:  Indefinite   Send INR reminders to:  ANTICOAG LVAD    Indications    LVAD (left ventricular assist device) present (H) [Z95.811]  Chronic systolic congestive heart failure (H) [I50.22]  Paroxysmal atrial fibrillation (H) [I48.0]           Comments:  INR GOAL RANGE CHANGE 8/17/21 1.8-2.3 On  Amiodarone drinks 2-3 boosts/week. HM 3  placed 2/18/2020, 81mg ASA,         Anticoagulation Care Providers     Provider Role Specialty Phone number    Nader Cisneros MD Referring Cardiovascular Disease [phone]

## 2022-03-27 ENCOUNTER — HEALTH MAINTENANCE LETTER (OUTPATIENT)
Age: 67
End: 2022-03-27

## 2022-03-29 ENCOUNTER — CARE COORDINATION (OUTPATIENT)
Dept: CARDIOLOGY | Facility: CLINIC | Age: 67
End: 2022-03-29
Payer: MEDICARE

## 2022-03-29 DIAGNOSIS — I50.22 CHRONIC SYSTOLIC CONGESTIVE HEART FAILURE (H): ICD-10-CM

## 2022-03-29 DIAGNOSIS — I42.8 NONISCHEMIC CARDIOMYOPATHY (H): ICD-10-CM

## 2022-03-29 NOTE — PROGRESS NOTES
"    Patient denies chest pain and palpitations. Patient reports shortness of breath with activity, increased edema in bilateral ankles, \" not urinating as much.\" Patient continues to report mild dizziness in the morning, but \" not every morning\" Patient states the dizziness goes away without any intervention.      03/29/22 1321   Heartmate 3 LEFT VS   Flow (Lpm) 4.7 Lpm   Pulse Index (PI) 3 PI   Speed (rpm)   5550 rpm   Power (muhammad) 3.1 muhammad       Gladys Mccarthy, primary VAD CC notified. STEW Turciso notified.     9:34 AM  03/31/22  Patient reports that he does his daily weights and VAD parameters but does not write down these values. Educated patient on the importance of logging numbers, sending heartmate log book to patient via mail.     Weights (lbs)  3/27 214  3/28 215  3/29 216  3/30 217  3/31 216      Patient reports no change in shortness of breath with activity, bilateral ankles edema unchanged, patient reports going to the bathroom \" a lot\" \" got up every two hours to urinate last night.\"     Notified Gladys Mccarthy, primary VAD CC and STEW Turcios.     04/01/22  8:56 AM  Karolina recommendation: Bumex 3mg BID and Potassium 60 meq/40 meq     Patient called and agreed with plan. Patient verbalized understanding.   "

## 2022-04-01 ENCOUNTER — ANTICOAGULATION THERAPY VISIT (OUTPATIENT)
Dept: ANTICOAGULATION | Facility: CLINIC | Age: 67
End: 2022-04-01
Payer: MEDICARE

## 2022-04-01 DIAGNOSIS — I50.22 CHRONIC SYSTOLIC CONGESTIVE HEART FAILURE (H): ICD-10-CM

## 2022-04-01 DIAGNOSIS — Z95.811 LVAD (LEFT VENTRICULAR ASSIST DEVICE) PRESENT (H): Primary | ICD-10-CM

## 2022-04-01 DIAGNOSIS — I48.0 PAROXYSMAL ATRIAL FIBRILLATION (H): ICD-10-CM

## 2022-04-01 LAB — INR (EXTERNAL): 2.8 (ref 0.9–1.1)

## 2022-04-01 RX ORDER — BUMETANIDE 1 MG/1
3 TABLET ORAL 2 TIMES DAILY
Qty: 450 TABLET | Refills: 3 | Status: SHIPPED | OUTPATIENT
Start: 2022-04-01 | End: 2022-07-22

## 2022-04-01 RX ORDER — POTASSIUM CHLORIDE 1500 MG/1
TABLET, EXTENDED RELEASE ORAL
Qty: 360 TABLET | Refills: 3 | Status: SHIPPED | OUTPATIENT
Start: 2022-04-01 | End: 2022-07-22

## 2022-04-01 NOTE — PROGRESS NOTES
ANTICOAGULATION MANAGEMENT     Eliseo Tanner 66 year old male is on warfarin with supratherapeutic INR result. (Goal INR 1.8-2.3)    Recent labs: (last 7 days)     04/01/22  0000   INR 2.8*       ASSESSMENT       Source(s): Patient/Caregiver Call       Warfarin doses taken: Warfarin taken as instructed    Diet: No new diet changes identified    New illness, injury, or hospitalization: No    Medication/supplement changes: Bumex increased to 3mg BID and Potassium increased 60mg AM and 40mg PM    Signs or symptoms of bleeding or clotting: No    Previous INR: Supratherapeutic    Additional findings: None       PLAN     Recommended plan for no diet, medication or health factor changes affecting INR     Dosing Instructions: partial hold then decrease your warfarin dose (7.7% change) with next INR in 1 week       Summary  As of 4/1/2022    Full warfarin instructions:  4/1: 2 mg; Otherwise 2 mg every Sun, Wed; 4 mg all other days   Next INR check:  4/8/2022             Telephone call with Eliseo who verbalizes understanding and agrees to plan and who agrees to plan and repeated back plan correctly    Patient using outside facility for labs    Education provided: None required    Plan made with [facility] Pharmacist Sarah Gaffney and per LVAD protocol    Luiza Perkins, RN  Anticoagulation Clinic  4/1/2022    _______________________________________________________________________     Anticoagulation Episode Summary     Current INR goal:     TTR:  61.1 % (1 y)   Target end date:  Indefinite   Send INR reminders to:  ANTICOAG LVAD    Indications    LVAD (left ventricular assist device) present (H) [Z95.811]  Chronic systolic congestive heart failure (H) [I50.22]  Paroxysmal atrial fibrillation (H) [I48.0]           Comments:  INR GOAL RANGE CHANGE 8/17/21 1.8-2.3 On Amiodarone drinks 2-3 boosts/week. HM 3  placed 2/18/2020, 81mg ASA,         Anticoagulation Care Providers     Provider Role Specialty Phone number    Nader Cisneros MD  Referring Cardiovascular Disease [phone]

## 2022-04-04 RX ORDER — AMLODIPINE BESYLATE 5 MG/1
10 TABLET ORAL DAILY
Qty: 180 TABLET | Refills: 3 | Status: SHIPPED | OUTPATIENT
Start: 2022-04-04 | End: 2023-01-01

## 2022-04-04 NOTE — TELEPHONE ENCOUNTER
amLODIPine (NORVASC) 5 MG tablet  Last Written Prescription Date:  2/10/2022  Last Fill Quantity: 135,   # refills: 3  Last Office Visit :  2/10/2022  Future Office visit:   5/3/2022  Routing refill request to provider for review/approval because:  Pharmacy/Pt requesting a 90 day supply with refills for a whole year.   Last order did not cover a whole year.   Please advise and send new order.   Thank you       Betty العلي RN  Central Triage Red Flags/Med Refills

## 2022-04-06 ENCOUNTER — CARE COORDINATION (OUTPATIENT)
Dept: CARDIOLOGY | Facility: CLINIC | Age: 67
End: 2022-04-06
Payer: MEDICARE

## 2022-04-06 DIAGNOSIS — I50.22 CHRONIC SYSTOLIC CONGESTIVE HEART FAILURE (H): Primary | ICD-10-CM

## 2022-04-06 DIAGNOSIS — U07.1 INFECTION DUE TO 2019 NOVEL CORONAVIRUS: ICD-10-CM

## 2022-04-06 NOTE — PROGRESS NOTES
D:  Called to check in with pt regarding recent med changes.  Pt with stable weights btwn 213-216. Swelling and bloating have improved.  Current LVAD numbers, Speed: 5500, Flow: 4.5, PI: 3.1, Power: 4.7, which are all stable per basline.  Discussed some ongoing, mild fatigue and generally not feeling well.  Pt has seen cardiology, had testing, primary with testing and infectious disease, to discuss this.  Pt scheduled to see cardiology @ [place], [place] on Thursday & returns to the [facility] in May.    I:  Discussed with Dr. Cisneros, referred [facility] to [facility] symptoms.  Encouraged pt to page if symptoms worsen or change.    A:  Follow up  P:  Pt verbalized understanding of the instructions given.  Will call VAD coordinator with further needs and questions.

## 2022-04-07 DIAGNOSIS — Z95.811 LVAD (LEFT VENTRICULAR ASSIST DEVICE) PRESENT (H): ICD-10-CM

## 2022-04-07 LAB — INR (EXTERNAL): 2.66 (ref 0.9–1.1)

## 2022-04-08 ENCOUNTER — DOCUMENTATION ONLY (OUTPATIENT)
Dept: ANTICOAGULATION | Facility: CLINIC | Age: 67
End: 2022-04-08

## 2022-04-08 ENCOUNTER — ANTICOAGULATION THERAPY VISIT (OUTPATIENT)
Dept: ANTICOAGULATION | Facility: CLINIC | Age: 67
End: 2022-04-08
Payer: MEDICARE

## 2022-04-08 DIAGNOSIS — I25.10 CORONARY ARTERY DISEASE INVOLVING NATIVE CORONARY ARTERY OF NATIVE HEART WITHOUT ANGINA PECTORIS: ICD-10-CM

## 2022-04-08 DIAGNOSIS — I50.22 CHRONIC SYSTOLIC CONGESTIVE HEART FAILURE (H): ICD-10-CM

## 2022-04-08 DIAGNOSIS — I48.0 PAROXYSMAL ATRIAL FIBRILLATION (H): ICD-10-CM

## 2022-04-08 DIAGNOSIS — Z95.811 LVAD (LEFT VENTRICULAR ASSIST DEVICE) PRESENT (H): Primary | ICD-10-CM

## 2022-04-08 NOTE — PROGRESS NOTES
Anticoagulation Management    Discussed INR home monitoring program with Eliseo Tanner reviewing:      Elibigility requirements: >= 3 months of anticoagulation therapy, indication for chronic anticoagulation and order from provider    Required testing frequency (q1-2 weeks)    Home meters, testing supplies, meter training, and reporting of INR results done through an outside company. Patient would be contacted by home monitoring company to review insurance coverage with home monitoring company prior to enrolling.    [facility] would continue to receive and manage INR results.    Home monitoring application may take several weeks and must continue to follow up with recommended INR monitoring in clinic until receives monitor and training completed.     Home monitoring terms reviewed with patient      Patient agrees to frequency of testing as directed by referring provider ( weekly or biweekly) Yes    Testing to be performed during business hours of [facility] Yes    Patient agrees they have the skill (or a designated caregiver) necessary to perform the self test Yes    Patient agrees to report all INR results to INR home monitoring company Yes    Patient agrees to have additional INR test in clinic if a home result is critical Yes    Patient agrees to schedule an INR test at a [facility] clinic yearly for technique observation and quality check of INR results with their home meter Yes    Patient agrees to use a [facility] approved service provider and device for home monitoring Yes    [facility]    Referring provider: Dr. Nader Cisneros    Referring providers Clinic Fax number [phone]    Eliseo Tanner is interested home INR monitoring and requests order be submitted.

## 2022-04-08 NOTE — PROGRESS NOTES
ANTICOAGULATION MANAGEMENT     Eliseo Tanner 66 year old male is on warfarin with supratherapeutic INR result. (Goal INR 1.8-2.3)    Recent labs: (last 7 days)     04/07/22  0000   INR 2.66*       ASSESSMENT       Source(s): Patient/Caregiver Call       Warfarin doses taken: Warfarin taken as instructed    Diet: No new diet changes identified    New illness, injury, or hospitalization: No    Medication/supplement changes: None noted    Signs or symptoms of bleeding or clotting: No    Previous INR: Supratherapeutic    Additional findings: Pt is interested in a home monitoring machine. Will start the paperwork for this.  Pt is aware that this can take 4-6 weeks to process everything.        PLAN     Recommended plan for no diet, medication or health factor changes affecting INR     Dosing Instructions: decrease your warfarin dose (8.3% change) with next INR in 1 week       Summary  As of 4/8/2022    Full warfarin instructions:  2 mg every Sun, Wed, Fri; 4 mg all other days   Next INR check:  4/14/2022             Telephone call with Eliseo who verbalizes understanding and agrees to plan and who agrees to plan and repeated back plan correctly    Patient using outside facility for labs    Education provided: Information on home INR monitoring    Plan made with [facility] Pharmacist Tamiko Rosas and per LVAD protocol    Luiza Perkins, RN  Anticoagulation Clinic  4/8/2022    _______________________________________________________________________     Anticoagulation Episode Summary     Current INR goal:     TTR:  61.0 % (1 y)   Target end date:  Indefinite   Send INR reminders to:  ANTICOAG LVAD    Indications    LVAD (left ventricular assist device) present (H) [Z95.811]  Chronic systolic congestive heart failure (H) [I50.22]  Paroxysmal atrial fibrillation (H) [I48.0]           Comments:  INR GOAL RANGE CHANGE 8/17/21 1.8-2.3 On Amiodarone drinks 2-3 boosts/week. HM 3  placed 2/18/2020, 81mg ASA,         Anticoagulation Care  Providers     Provider Role Specialty Phone number    Nader Cisneros MD Referring Cardiovascular Disease [phone]

## 2022-04-10 RX ORDER — AMIODARONE HYDROCHLORIDE 200 MG/1
TABLET ORAL
Qty: 90 TABLET | Refills: 3 | Status: SHIPPED | OUTPATIENT
Start: 2022-04-10 | End: 2023-01-01

## 2022-04-10 NOTE — TELEPHONE ENCOUNTER
amiodarone 200 mg tablet      Last Written Prescription Date:  4-19-21  Last Fill Quantity: 90,   # refills: 3  Last Office Visit : 2-10-22  Future Office visit:  5-3-22   (Also follow w/ local [facility] [place])    Routing refill request to provider for review/approval because:  Med not on protocol

## 2022-04-14 ENCOUNTER — TRANSFERRED RECORDS (OUTPATIENT)
Dept: HEALTH INFORMATION MANAGEMENT | Facility: CLINIC | Age: 67
End: 2022-04-14
Payer: MEDICARE

## 2022-04-14 ENCOUNTER — ANTICOAGULATION THERAPY VISIT (OUTPATIENT)
Dept: ANTICOAGULATION | Facility: CLINIC | Age: 67
End: 2022-04-14
Payer: MEDICARE

## 2022-04-14 DIAGNOSIS — I50.22 CHRONIC SYSTOLIC CONGESTIVE HEART FAILURE (H): ICD-10-CM

## 2022-04-14 DIAGNOSIS — Z95.811 LVAD (LEFT VENTRICULAR ASSIST DEVICE) PRESENT (H): Primary | ICD-10-CM

## 2022-04-14 DIAGNOSIS — I48.0 PAROXYSMAL ATRIAL FIBRILLATION (H): ICD-10-CM

## 2022-04-14 LAB — INR (EXTERNAL): 4.1 (ref 0.9–1.1)

## 2022-04-14 RX ORDER — ATORVASTATIN CALCIUM 20 MG/1
20 TABLET, FILM COATED ORAL DAILY
Qty: 90 TABLET | Refills: 3 | Status: ON HOLD | OUTPATIENT
Start: 2022-04-14 | End: 2022-01-01

## 2022-04-14 NOTE — PROGRESS NOTES
D:  Discussed with pt recent med changes at [facility].    I:  Discussed Lipitor restart and Entresto with Dr. Cisneros.  Plan: since recent liver function testing improved, pt to restart Lipitor 20 mg daily. Script sent.  Pt to take Entresto, half, of one 49-51 pill, once daily for 1 week. Take daily blood pressures.  Pt to report dizziness and MAP's next Thursday to writer.  If MAP still elevated and pt tolerating medication, pt to increase Entresto dose.  A:  Clinic follow up  P:  Pt verbalized understanding of the instructions given.  Will call VAD coordinator with further needs and questions.

## 2022-04-14 NOTE — PROGRESS NOTES
ANTICOAGULATION MANAGEMENT     Eliseo Tanner 66 year old male is on warfarin with supratherapeutic INR result. (Goal INR 1.8-2.3)    Recent labs: (last 7 days)     04/14/22  1132   INR 4.1*       ASSESSMENT       Source(s): Chart Review and Patient/Caregiver Call       Warfarin doses taken: Warfarin taken as instructed    Diet: Increased greens/vitamin K in diet; ongoing change Eats avacado toast for breakfast daily.    New illness, injury, or hospitalization: No    Medication/supplement changes: Entresto started on 4/12/22 No interaction anticipated     Restarted Lipitor on 4/7/22    Signs or symptoms of bleeding or clotting: Yes: slight blood in tissue when blowing his nose.     Previous INR: Supratherapeutic    Additional findings: Eliseo requested next INR draw on Tuesday with other labs at local clinic.       PLAN     Recommended plan for ongoing change(s) affecting INR     Dosing Instructions: hold dose then decrease your warfarin dose (18.2% change) with next INR in 5 days       Summary  As of 4/14/2022    Full warfarin instructions:  4/14: Hold; Otherwise 4 mg every Tue, Sat; 2 mg all other days   Next INR check:  4/19/2022             Telephone call with Eliseo who agrees to plan and repeated back plan correctly    Patient using outside facility for labs    Education provided: Monitoring for bleeding signs and symptoms, Monitoring for clotting signs and symptoms, When to seek medical attention/emergency care, Importance of notifying clinic for changes in medications; a sooner lab recheck maybe needed. and Importance of notifying clinic for diarrhea, nausea/vomiting, reduced intake, and/or illness; a sooner lab recheck maybe needed.    Plan made with [facility] Pharmacist Tamiko Rosas and per LVAD protocol    Young Bustos, RN  Anticoagulation Clinic  4/14/2022    _______________________________________________________________________     Anticoagulation Episode Summary     Current INR goal:     TTR:  59.8 % (1 y)    Target end date:  Indefinite   Send INR reminders to:  ANTICOAG LVAD    Indications    LVAD (left ventricular assist device) present (H) [Z95.811]  Chronic systolic congestive heart failure (H) [I50.22]  Paroxysmal atrial fibrillation (H) [I48.0]           Comments:  INR GOAL RANGE CHANGE 8/17/21 1.8-2.3 On Amiodarone drinks 2-3 boosts/week. HM 3  placed 2/18/2020, 81mg ASA,         Anticoagulation Care Providers     Provider Role Specialty Phone number    Nader Cisneros MD Referring Cardiovascular Disease [phone]

## 2022-04-19 ENCOUNTER — ANTICOAGULATION THERAPY VISIT (OUTPATIENT)
Dept: ANTICOAGULATION | Facility: CLINIC | Age: 67
End: 2022-04-19
Payer: MEDICARE

## 2022-04-19 DIAGNOSIS — I50.22 CHRONIC SYSTOLIC CONGESTIVE HEART FAILURE (H): ICD-10-CM

## 2022-04-19 DIAGNOSIS — Z95.811 LVAD (LEFT VENTRICULAR ASSIST DEVICE) PRESENT (H): Primary | ICD-10-CM

## 2022-04-19 DIAGNOSIS — I48.0 PAROXYSMAL ATRIAL FIBRILLATION (H): ICD-10-CM

## 2022-04-19 LAB — INR (EXTERNAL): 2.2 (ref 0.9–1.1)

## 2022-04-19 NOTE — PROGRESS NOTES
ANTICOAGULATION MANAGEMENT     Eliseo Tanner 66 year old male is on warfarin with therapeutic INR result. (Goal INR 1.8-2.3)    Recent labs: (last 7 days)     04/19/22  0000   INR 2.2*       ASSESSMENT       Source(s): Chart Review and Patient/Caregiver Call       Warfarin doses taken: Warfarin taken as instructed    Diet: No new diet changes identified    New illness, injury, or hospitalization: No    Medication/supplement changes: None noted    Signs or symptoms of bleeding or clotting: No    Previous INR: Supratherapeutic    Additional findings: None       PLAN     Recommended plan for no diet, medication or health factor changes affecting INR     Dosing Instructions: continue your current warfarin dose with next INR in 1 week       Summary  As of 4/19/2022    Full warfarin instructions:  4 mg every Tue, Sat; 2 mg all other days   Next INR check:  4/26/2022             Telephone call with Eliseo who verbalizes understanding and agrees to plan and who agrees to plan and repeated back plan correctly    Patient using outside facility for labs    Education provided: Goal range and significance of current result    Plan made per [facility] anticoagulation protocol and per LVAD protocol    Char Duenas RN  Anticoagulation Clinic  4/19/2022    _______________________________________________________________________     Anticoagulation Episode Summary     Current INR goal:     TTR:  60.4 % (1 y)   Target end date:  Indefinite   Send INR reminders to:  ANTICOAG LVAD    Indications    LVAD (left ventricular assist device) present (H) [Z95.811]  Chronic systolic congestive heart failure (H) [I50.22]  Paroxysmal atrial fibrillation (H) [I48.0]           Comments:  INR GOAL RANGE CHANGE 8/17/21 1.8-2.3 On Amiodarone drinks 2-3 boosts/week. HM 3  placed 2/18/2020, 81mg ASA,         Anticoagulation Care Providers     Provider Role Specialty Phone number    Nader Cisneros MD Referring Cardiovascular Disease [phone]

## 2022-04-26 ENCOUNTER — CARE COORDINATION (OUTPATIENT)
Dept: CARDIOLOGY | Facility: CLINIC | Age: 67
End: 2022-04-26
Payer: MEDICARE

## 2022-04-26 ENCOUNTER — ANTICOAGULATION THERAPY VISIT (OUTPATIENT)
Dept: ANTICOAGULATION | Facility: CLINIC | Age: 67
End: 2022-04-26
Payer: MEDICARE

## 2022-04-26 ENCOUNTER — TRANSFERRED RECORDS (OUTPATIENT)
Dept: HEALTH INFORMATION MANAGEMENT | Facility: CLINIC | Age: 67
End: 2022-04-26
Payer: MEDICARE

## 2022-04-26 DIAGNOSIS — Z95.811 LVAD (LEFT VENTRICULAR ASSIST DEVICE) PRESENT (H): Primary | ICD-10-CM

## 2022-04-26 DIAGNOSIS — I50.22 CHRONIC SYSTOLIC CONGESTIVE HEART FAILURE (H): ICD-10-CM

## 2022-04-26 DIAGNOSIS — I48.0 PAROXYSMAL ATRIAL FIBRILLATION (H): ICD-10-CM

## 2022-04-26 LAB — INR (EXTERNAL): 1.7 (ref 0.9–1.1)

## 2022-04-26 NOTE — PROGRESS NOTES
D:  Called pt to check in on MAP's following the start of Entresto.  Pt reports MAP's have decreased for >100 to now 86 consistently.  Pt denies dizziness and lightheadedness entirely.  Pt did report that he is noticing some lower extremity swelling that worsens throughout the day. Weights have bee stable between 213-215. No abdominal fullness or shortness of breath.  I:  Instructed pt to continue taking Entresto as ordered.  Encouraged pt and caregiver to continue to monitor MAP's daily as well as LVAD parameters and weights.  Pt to call with changes.  Encouraged pt to keep feet elevated when sitting, and to use jason hose to assist with swelling.  Pt also instructed to call primary care MD to follow up regarding probable PVD.  A:  Clinic follow up  P:  Pt verbalized understanding of the instructions given.  Will call VAD coordinator with further needs and questions.

## 2022-04-26 NOTE — PROGRESS NOTES
ANTICOAGULATION MANAGEMENT     Eliseo Tanner 66 year old male is on warfarin with subtherapeutic INR result. (Goal INR The patient does not have an INR goal.)    Recent labs: (last 7 days)     04/26/22  0000   INR 1.7*       ASSESSMENT       Source(s): Chart Review and Patient/Caregiver Call       Warfarin doses taken: Warfarin taken as instructed    Diet: No new diet changes identified    New illness, injury, or hospitalization: No    Medication/supplement changes: None noted    Signs or symptoms of bleeding or clotting: No    Previous INR: Therapeutic last visit; previously outside of goal range    Additional findings: None       PLAN     Recommended plan for no diet, medication or health factor changes affecting INR     Dosing Instructions: Increase your warfarin dose (11% change) with next INR in 1 week       Summary  As of 4/26/2022    Full warfarin instructions:  4 mg every Tue, Thu, Sat; 2 mg all other days   Next INR check:  5/3/2022             Telephone call with Eliseo who verbalizes understanding and agrees to plan and who agrees to plan and repeated back plan correctly    Patient to recheck with home meter    Education provided: Goal range and significance of current result and Importance of therapeutic range    Plan made with [facility] Pharmacist Sarah Abbasi RN  Anticoagulation Clinic  4/26/2022    _______________________________________________________________________     Anticoagulation Episode Summary     Current INR goal:     TTR:  59.8 % (1 y)   Target end date:  Indefinite   Send INR reminders to:  ANTICOAG LVAD    Indications    LVAD (left ventricular assist device) present (H) [Z95.811]  Chronic systolic congestive heart failure (H) [I50.22]  Paroxysmal atrial fibrillation (H) [I48.0]           Comments:  INR GOAL RANGE CHANGE 8/17/21 1.8-2.3 On Amiodarone drinks 2-3 boosts/week. HM 3  placed 2/18/2020, 81mg ASA,         Anticoagulation Care Providers     Provider Role Specialty  Phone number    Nader Cisneros MD Referring Cardiovascular Disease [phone]

## 2022-04-29 DIAGNOSIS — I50.22 CHRONIC SYSTOLIC CONGESTIVE HEART FAILURE (H): Primary | ICD-10-CM

## 2022-05-03 ENCOUNTER — OFFICE VISIT (OUTPATIENT)
Dept: CARDIOLOGY | Facility: CLINIC | Age: 67
End: 2022-05-03
Attending: INTERNAL MEDICINE
Payer: MEDICARE

## 2022-05-03 ENCOUNTER — ANTICOAGULATION THERAPY VISIT (OUTPATIENT)
Dept: ANTICOAGULATION | Facility: CLINIC | Age: 67
End: 2022-05-03

## 2022-05-03 ENCOUNTER — LAB (OUTPATIENT)
Dept: LAB | Facility: CLINIC | Age: 67
End: 2022-05-03
Attending: INTERNAL MEDICINE

## 2022-05-03 VITALS
WEIGHT: 214.7 LBS | SYSTOLIC BLOOD PRESSURE: 85 MMHG | OXYGEN SATURATION: 94 % | HEIGHT: 67 IN | BODY MASS INDEX: 33.7 KG/M2 | HEART RATE: 67 BPM

## 2022-05-03 DIAGNOSIS — I48.0 PAROXYSMAL ATRIAL FIBRILLATION (H): ICD-10-CM

## 2022-05-03 DIAGNOSIS — I42.8 NONISCHEMIC CARDIOMYOPATHY (H): ICD-10-CM

## 2022-05-03 DIAGNOSIS — I10 BENIGN ESSENTIAL HYPERTENSION: ICD-10-CM

## 2022-05-03 DIAGNOSIS — I50.23 ACUTE ON CHRONIC SYSTOLIC CONGESTIVE HEART FAILURE (H): ICD-10-CM

## 2022-05-03 DIAGNOSIS — I50.22 CHRONIC SYSTOLIC CONGESTIVE HEART FAILURE (H): ICD-10-CM

## 2022-05-03 DIAGNOSIS — I50.20 HEART FAILURE WITH REDUCED EJECTION FRACTION, NYHA CLASS III (H): ICD-10-CM

## 2022-05-03 DIAGNOSIS — E78.2 MIXED HYPERLIPIDEMIA: ICD-10-CM

## 2022-05-03 DIAGNOSIS — Z95.811 LVAD (LEFT VENTRICULAR ASSIST DEVICE) PRESENT (H): ICD-10-CM

## 2022-05-03 DIAGNOSIS — Z95.811 LVAD (LEFT VENTRICULAR ASSIST DEVICE) PRESENT (H): Primary | ICD-10-CM

## 2022-05-03 DIAGNOSIS — I50.22 CHRONIC SYSTOLIC CONGESTIVE HEART FAILURE (H): Primary | ICD-10-CM

## 2022-05-03 LAB
ALBUMIN SERPL-MCNC: 3.7 G/DL (ref 3.4–5)
ALP SERPL-CCNC: 151 U/L (ref 40–150)
ALT SERPL W P-5'-P-CCNC: 44 U/L (ref 0–70)
ANION GAP SERPL CALCULATED.3IONS-SCNC: 11 MMOL/L (ref 3–14)
AST SERPL W P-5'-P-CCNC: 46 U/L (ref 0–45)
BILIRUB SERPL-MCNC: 0.7 MG/DL (ref 0.2–1.3)
BUN SERPL-MCNC: 46 MG/DL (ref 7–30)
CALCIUM SERPL-MCNC: 9 MG/DL (ref 8.5–10.1)
CHLORIDE BLD-SCNC: 102 MMOL/L (ref 94–109)
CO2 SERPL-SCNC: 26 MMOL/L (ref 20–32)
CREAT SERPL-MCNC: 2.1 MG/DL (ref 0.66–1.25)
D DIMER PPP FEU-MCNC: 4.12 UG/ML FEU (ref 0–0.5)
ERYTHROCYTE [DISTWIDTH] IN BLOOD BY AUTOMATED COUNT: 18.7 % (ref 10–15)
GFR SERPL CREATININE-BSD FRML MDRD: 34 ML/MIN/1.73M2
GLUCOSE BLD-MCNC: 125 MG/DL (ref 70–99)
HCT VFR BLD AUTO: 41.9 % (ref 40–53)
HGB BLD-MCNC: 13.7 G/DL (ref 13.3–17.7)
INR PPP: 1.86 (ref 0.85–1.15)
LDH SERPL L TO P-CCNC: 253 U/L (ref 85–227)
MCH RBC QN AUTO: 29.3 PG (ref 26.5–33)
MCHC RBC AUTO-ENTMCNC: 32.7 G/DL (ref 31.5–36.5)
MCV RBC AUTO: 90 FL (ref 78–100)
MDC_IDC_LEAD_IMPLANT_DT: NORMAL
MDC_IDC_LEAD_LOCATION: NORMAL
MDC_IDC_LEAD_LOCATION_DETAIL_1: NORMAL
MDC_IDC_LEAD_MFG: NORMAL
MDC_IDC_LEAD_MODEL: NORMAL
MDC_IDC_LEAD_POLARITY_TYPE: NORMAL
MDC_IDC_LEAD_SERIAL: NORMAL
MDC_IDC_LEAD_SPECIAL_FUNCTION: NORMAL
MDC_IDC_MSMT_BATTERY_DTM: NORMAL
MDC_IDC_MSMT_BATTERY_REMAINING_LONGEVITY: 120 MO
MDC_IDC_MSMT_BATTERY_RRT_TRIGGER: 2.73
MDC_IDC_MSMT_BATTERY_STATUS: NORMAL
MDC_IDC_MSMT_BATTERY_VOLTAGE: 3 V
MDC_IDC_MSMT_CAP_CHARGE_DTM: NORMAL
MDC_IDC_MSMT_CAP_CHARGE_ENERGY: 18 J
MDC_IDC_MSMT_CAP_CHARGE_TIME: 3.71
MDC_IDC_MSMT_CAP_CHARGE_TYPE: NORMAL
MDC_IDC_MSMT_LEADCHNL_RV_IMPEDANCE_VALUE: 304 OHM
MDC_IDC_MSMT_LEADCHNL_RV_IMPEDANCE_VALUE: 399 OHM
MDC_IDC_MSMT_LEADCHNL_RV_PACING_THRESHOLD_AMPLITUDE: 0.75 V
MDC_IDC_MSMT_LEADCHNL_RV_PACING_THRESHOLD_PULSEWIDTH: 0.4 MS
MDC_IDC_MSMT_LEADCHNL_RV_SENSING_INTR_AMPL: 11.1 MV
MDC_IDC_PG_IMPLANT_DTM: NORMAL
MDC_IDC_PG_MFG: NORMAL
MDC_IDC_PG_MODEL: NORMAL
MDC_IDC_PG_SERIAL: NORMAL
MDC_IDC_PG_TYPE: NORMAL
MDC_IDC_SESS_CLINIC_NAME: NORMAL
MDC_IDC_SESS_DTM: NORMAL
MDC_IDC_SESS_TYPE: NORMAL
MDC_IDC_SET_BRADY_HYSTRATE: NORMAL
MDC_IDC_SET_BRADY_LOWRATE: 60 {BEATS}/MIN
MDC_IDC_SET_BRADY_MAX_SENSOR_RATE: 115 {BEATS}/MIN
MDC_IDC_SET_BRADY_MODE: NORMAL
MDC_IDC_SET_LEADCHNL_RV_PACING_AMPLITUDE: 2 V
MDC_IDC_SET_LEADCHNL_RV_PACING_ANODE_ELECTRODE_1: NORMAL
MDC_IDC_SET_LEADCHNL_RV_PACING_ANODE_LOCATION_1: NORMAL
MDC_IDC_SET_LEADCHNL_RV_PACING_CAPTURE_MODE: NORMAL
MDC_IDC_SET_LEADCHNL_RV_PACING_CATHODE_ELECTRODE_1: NORMAL
MDC_IDC_SET_LEADCHNL_RV_PACING_CATHODE_LOCATION_1: NORMAL
MDC_IDC_SET_LEADCHNL_RV_PACING_POLARITY: NORMAL
MDC_IDC_SET_LEADCHNL_RV_PACING_PULSEWIDTH: 0.4 MS
MDC_IDC_SET_LEADCHNL_RV_SENSING_ANODE_ELECTRODE_1: NORMAL
MDC_IDC_SET_LEADCHNL_RV_SENSING_ANODE_LOCATION_1: NORMAL
MDC_IDC_SET_LEADCHNL_RV_SENSING_CATHODE_ELECTRODE_1: NORMAL
MDC_IDC_SET_LEADCHNL_RV_SENSING_CATHODE_LOCATION_1: NORMAL
MDC_IDC_SET_LEADCHNL_RV_SENSING_POLARITY: NORMAL
MDC_IDC_SET_LEADCHNL_RV_SENSING_SENSITIVITY: 0.3 MV
MDC_IDC_SET_ZONE_DETECTION_BEATS_DENOMINATOR: 40 {BEATS}
MDC_IDC_SET_ZONE_DETECTION_BEATS_NUMERATOR: 30 {BEATS}
MDC_IDC_SET_ZONE_DETECTION_INTERVAL: 270 MS
MDC_IDC_SET_ZONE_DETECTION_INTERVAL: 460 MS
MDC_IDC_SET_ZONE_DETECTION_INTERVAL: 500 MS
MDC_IDC_SET_ZONE_DETECTION_INTERVAL: NORMAL
MDC_IDC_SET_ZONE_TYPE: NORMAL
MDC_IDC_STAT_AT_BURDEN_PERCENT: 0 %
MDC_IDC_STAT_AT_DTM_END: NORMAL
MDC_IDC_STAT_AT_DTM_START: NORMAL
MDC_IDC_STAT_BRADY_DTM_END: NORMAL
MDC_IDC_STAT_BRADY_DTM_START: NORMAL
MDC_IDC_STAT_BRADY_RV_PERCENT_PACED: 93.34 %
MDC_IDC_STAT_EPISODE_RECENT_COUNT: 0
MDC_IDC_STAT_EPISODE_RECENT_COUNT_DTM_END: NORMAL
MDC_IDC_STAT_EPISODE_RECENT_COUNT_DTM_START: NORMAL
MDC_IDC_STAT_EPISODE_TOTAL_COUNT: 0
MDC_IDC_STAT_EPISODE_TOTAL_COUNT: 2
MDC_IDC_STAT_EPISODE_TOTAL_COUNT: 233
MDC_IDC_STAT_EPISODE_TOTAL_COUNT_DTM_END: NORMAL
MDC_IDC_STAT_EPISODE_TOTAL_COUNT_DTM_START: NORMAL
MDC_IDC_STAT_EPISODE_TYPE: NORMAL
MDC_IDC_STAT_TACHYTHERAPY_ATP_DELIVERED_RECENT: 0
MDC_IDC_STAT_TACHYTHERAPY_ATP_DELIVERED_TOTAL: 0
MDC_IDC_STAT_TACHYTHERAPY_RECENT_DTM_END: NORMAL
MDC_IDC_STAT_TACHYTHERAPY_RECENT_DTM_START: NORMAL
MDC_IDC_STAT_TACHYTHERAPY_SHOCKS_ABORTED_RECENT: 0
MDC_IDC_STAT_TACHYTHERAPY_SHOCKS_ABORTED_TOTAL: 0
MDC_IDC_STAT_TACHYTHERAPY_SHOCKS_DELIVERED_RECENT: 0
MDC_IDC_STAT_TACHYTHERAPY_SHOCKS_DELIVERED_TOTAL: 0
MDC_IDC_STAT_TACHYTHERAPY_TOTAL_DTM_END: NORMAL
MDC_IDC_STAT_TACHYTHERAPY_TOTAL_DTM_START: NORMAL
PLATELET # BLD AUTO: 145 10E3/UL (ref 150–450)
POTASSIUM BLD-SCNC: 4.2 MMOL/L (ref 3.4–5.3)
PROT SERPL-MCNC: 7.6 G/DL (ref 6.8–8.8)
RBC # BLD AUTO: 4.68 10E6/UL (ref 4.4–5.9)
SODIUM SERPL-SCNC: 139 MMOL/L (ref 133–144)
WBC # BLD AUTO: 5.1 10E3/UL (ref 4–11)

## 2022-05-03 PROCEDURE — 85379 FIBRIN DEGRADATION QUANT: CPT | Performed by: INTERNAL MEDICINE

## 2022-05-03 PROCEDURE — 94729 DIFFUSING CAPACITY: CPT | Performed by: INTERNAL MEDICINE

## 2022-05-03 PROCEDURE — 80053 COMPREHEN METABOLIC PANEL: CPT | Performed by: PATHOLOGY

## 2022-05-03 PROCEDURE — 85610 PROTHROMBIN TIME: CPT | Performed by: PATHOLOGY

## 2022-05-03 PROCEDURE — 85027 COMPLETE CBC AUTOMATED: CPT | Performed by: PATHOLOGY

## 2022-05-03 PROCEDURE — G0463 HOSPITAL OUTPT CLINIC VISIT: HCPCS | Mod: 25

## 2022-05-03 PROCEDURE — 94375 RESPIRATORY FLOW VOLUME LOOP: CPT | Performed by: INTERNAL MEDICINE

## 2022-05-03 PROCEDURE — 99214 OFFICE O/P EST MOD 30 MIN: CPT | Mod: 25 | Performed by: INTERNAL MEDICINE

## 2022-05-03 PROCEDURE — 36415 COLL VENOUS BLD VENIPUNCTURE: CPT | Performed by: PATHOLOGY

## 2022-05-03 PROCEDURE — 93750 INTERROGATION VAD IN PERSON: CPT | Performed by: INTERNAL MEDICINE

## 2022-05-03 PROCEDURE — 93282 PRGRMG EVAL IMPLANTABLE DFB: CPT | Performed by: INTERNAL MEDICINE

## 2022-05-03 PROCEDURE — 94726 PLETHYSMOGRAPHY LUNG VOLUMES: CPT | Performed by: INTERNAL MEDICINE

## 2022-05-03 PROCEDURE — 83615 LACTATE (LD) (LDH) ENZYME: CPT | Performed by: PATHOLOGY

## 2022-05-03 RX ORDER — HYDROCHLOROTHIAZIDE 12.5 MG/1
50 TABLET ORAL DAILY
Qty: 15 TABLET | Refills: 0 | Status: SHIPPED | OUTPATIENT
Start: 2022-05-03 | End: 2022-06-24

## 2022-05-03 RX ORDER — ISOSORBIDE MONONITRATE 30 MG/1
30 TABLET, EXTENDED RELEASE ORAL DAILY
Qty: 90 TABLET | Refills: 3 | Status: SHIPPED | OUTPATIENT
Start: 2022-05-03 | End: 2022-06-09

## 2022-05-03 ASSESSMENT — PAIN SCALES - GENERAL: PAINLEVEL: NO PAIN (0)

## 2022-05-03 NOTE — LETTER
5/3/2022      RE: Eliseo Tanner  [address] [place] [facility] [place] [address]       Dear Colleague,    Thank you for the opportunity to participate in the care of your patient, Eliseo Tanner, at the [facility] [place] at [facility]. Please see a copy of my visit note below.    May 2, 2022     Eliseo Tanner is a 66yr old male with a past medical history of chronic systolic heart failure, Stage D, Class III, nonischemic cardiomyopathy, now s/p HM3 implant on 2/18/2020, HTN, GERD, ABHINAV on CPAP, DM2 and CKD3.     His HM3 post-op course was complicated by retrosternal hematoma and bleeding in the lungs, RV failure, VT and Aflutter w/RVR s/p DCCV. He had pre-op proteus and enterococcus bacteremia from 2/13/2020, s/p abx. He had an ICD placed on 3/16/2020 just before his discharge from the hospital. Readmitted in 11/2020 with MSSA bacteremia. Abdominal imaging (US and CT abd/pelvis) revealed no intraabdominal fluid collection concerning for abscess/infectious process. TTE did not comment on vegetation. CHAMP also did not show any signs of endocarditis. Last positive BCx on 11/14/20 showed MSSA, and abx were stopped on 12/27/20.   Readmitted in 02/2021 for mixed cardiogenic and distributive shock with an intermittent wide complex tachycardia (thought to be 2/2 to 3:1 aflutter with aberrant conduction). 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 Initially required high doses of vasopressors and inotropes and was intubated for hypoxemia. LDH was severely elevated, but thought to be related to legionella rather than actual pump thrombus. Had acute liver injury with LFTs in the several thousands. Also had BERNARDA, which required CRRT. He was treated for legionella pneumonia and ultimately recovered relatively well and was discharged in stable condition not requiring dialysis (cession of HD 3/10/21).  Was seen in the HF clinic at [place] in 04/2021, at which time he was feeling ok at that  time. Had some mild dizziness if changing positions too quickly. No changes were made. He did reports mild brown discharge from the drive line site and increased nose bleeds. His INR goal was decreased to 1.8-2.3. He was seen 6 months ago in our clinic and was doing well.   In 2022 patient tested positive for Covid-19. In February he began feeling more fatigued, increased shortness of breath and reported chills. He underwent further work-up at the [facility] with right heart cath, echo and driveline cultures. Right heart cath revealed elevated filling pressures, echo stable from previous and cultures negative.  Overall he has been doing well recently without any new complaints.  No dizziness lightheadedness palpitations.  There is some ongoing drainage from the driveline site which has been unchanged.  He is taking the antibiotics.  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  SOCIAL HISTORY:  Social History     Socioeconomic History     Marital status:    Tobacco Use     Smoking status: Former Smoker     Quit date: 1994     Years since quittin.1     Smokeless tobacco: Never Used   Substance and Sexual Activity     Alcohol use: Not Currently     Drug use: Not Currently     CURRENT MEDICATIONS:  Current Outpatient Medications   Medication     allopurinol (ZYLOPRIM) 100 MG tablet     amiodarone (PACERONE) 200 MG tablet     amLODIPine (NORVASC) 5 MG tablet     aspirin (ASA) 81 MG EC tablet     atorvastatin (LIPITOR) 20 MG tablet     B Complex-C-Folic Acid (VIRT-CAPS) 1 MG CAPS     bumetanide (BUMEX) 1 MG tablet     cefadroxil  "(DURICEF) 500 MG capsule     co-enzyme Q-10 200 MG CAPS     finasteride (PROSCAR) 5 MG tablet     FLUoxetine (PROZAC) 10 MG capsule     hydrALAZINE (APRESOLINE) 50 MG tablet     insulin glargine (BASAGLAR KWIKPEN) 100 UNIT/ML pen     insulin pen needle (BD JAIME U/F) 32G X 4 MM miscellaneous     magnesium oxide (MAG-OX) 400 MG tablet     nitroGLYcerin (NITROSTAT) 0.4 MG sublingual tablet     omeprazole (PRILOSEC) 20 MG DR capsule     potassium chloride ER (KLOR-CON M) 20 MEQ CR tablet     sacubitril-valsartan (ENTRESTO) 49-51 MG per tablet     senna-docusate (SENOKOT-S/PERICOLACE) 8.6-50 MG tablet     tamsulosin (FLOMAX) 0.4 MG capsule     warfarin ANTICOAGULANT (COUMADIN) 2 MG tablet     warfarin ANTICOAGULANT (COUMADIN) 4 MG tablet     zolpidem (AMBIEN) 5 MG tablet     No current facility-administered medications for this visit.     ROS:   Constitutional: No fever, chills, or sweats. .  ENT: No visual disturbance, ear ache, epistaxis, sore throat.   Allergies/Immunologic: Negative.   Respiratory: No cough, hemoptysis.   Cardiovascular: As per HPI.   GI: No nausea, vomiting, hematemesis, melena, or hematochezia.   : No urinary frequency, dysuria, or hematuria.   Integument: Negative.   Psychiatric: Pleasant, no major depression noted  Neuro: No focal neurological deficits noted  Endocrinology: Negative.   Musculoskeletal: As per HPI.      EXAM:  BP (!) 85/0 (BP Location: Right arm, Patient Position: Chair, Cuff Size: Adult Regular)   Pulse 67   Ht 1.702 m (5' 7\")   Wt 97.4 kg (214 lb 11.2 oz)   SpO2 94%   BMI 33.63 kg/m    Constitutional: NAD  Neck: JVD to mid neck   cardiovascular: Continuous LVAD hum  Pulmonary: Lungs clear to auscultation bilaterally   Abdominal: Soft, NT, ND, +BS. Drive line dressing CDI  Musculoskeletal: No gross deformities   Extremities: Warm.  1+ edema   Skin: Pink, warm, dry  Neurological: A&O x3, grossly non-focal   Psych: pleasant     HM3 Parameters:  Speed: 5500 rpm / Lower speed " limit: 5300  Flow: 4.5 LPM  Pulsatility Index: 3.5  Power: 4.6 muhammad  No low flow alarms, PI events noted     Labs:  Lab Results   Component Value Date    WBC 7.9 02/10/2022    HGB 13.0 (L) 02/10/2022    HCT 41.4 02/10/2022     02/10/2022     02/10/2022    POTASSIUM 4.2 02/10/2022    CHLORIDE 102 04/26/2022    CO2 21 02/10/2022    BUN 31 (H) 02/10/2022    CR 1.29 (H) 02/10/2022     (H) 02/10/2022    SED 72 (H) 02/16/2021    DD 2.75 (H) 02/10/2022    NTBNPI 27,781 (H) 02/15/2021    NTBNP 4,416 (H) 02/10/2022    TROPI 0.377 (HH) 02/15/2021    AST 54 (H) 02/10/2022    ALT 55 02/10/2022    ALKPHOS 177 (H) 02/10/2022    BILITOTAL 0.4 02/10/2022    INR 1.7 (H) 04/26/2022     Echo 2/22/2021: LVEF 15-20%; RV appears at least moderate to severely reduced and grossly dilated; aortic valve opens intermittently   CHAMP 3/3/2021: LVEF 10-20%; moderate to severe RV dilation with severely reduced RV function; aortic valve opens partially with each beat, mild to moderate AI, mild to moderatd MR  Echo 2/10/2022: LVEF 10-15%, moderate RV dilated and moderately reduced function, aortic valve opens with each beat, trace AI, mild MR    RHC 2/13/2022:  RA: 15  RV: 63/15  PA: 62/22 (35)  PCWP: 20  TD CI/CO: 2.59/5.39  Jane CI/CO: 2.17/4.53  PVR: 2.8 HUSAIN     ASSESSMENT AND PLAN:  In summary, patient is a 66 year old male with above past medical history, nonischemic cardiomyopathy status post HeartMate 3 implantation on 2/18/2020 as destination therapy who is here for follow-up.  Overall has been doing well denies any new complaints or issues.  After lengthy discussion we decided to add Imdur 30 mg once a day given ongoing persistent hypertension and being on hydralazine 100 mg 3 times daily as well as amlodipine 10 mg daily.  All side effects including headache discussed and he will try to push it through for 3 days.  In addition we will add HCTZ 50 mg for 3 days given volume overload     1. Chronic systolic heart failure.  NICM. S/p HM3 implant on 2/18/2020 as DT  Device function: no PI events/power spikes. Current VAD parameters acceptable. Obtain LDH today.  Functional status: stable. He is currently NYHA FC III.   RV function: clinically stable, echo 2/2022 at  with moderate RV dilation and moderate dysfunction  Drive-line: Daily dressing changes and stable drainage per wife.   On chronic suppressive therapy with cefadroxil 500 mg BID.   Anticoagulation: INR managed by [facility] team. Goal INR 1.8-2.3. Therapeutic range decreased due to frequent nose bleeds.   Antiplatelet Therapy: Continue ASA 81 mg daily.   Beta-blocker: None due to history of shock.  ACEi: On lisinopril 10 mg daily, continue   Aldosterone antagonist: None. COntionue to hold given fluctuating creatinine  Vasodilators: On hydralazine 100 mg TID and to just add Imdur 30 mg daily.  Side effects discussed  Volume status: Appears somewhat volume overloaded today.  Does have ongoing lower extremity edema.  As such we decided to continue Bumex 3 mg at this time and we will add HCTZ 50 mg for 3 days.  We will have labs checked next week as well.  Will not change baseline Bumex however in the future may add further doses of HCTZ as needed.  Obtain CBC with diff, CMP, BNP, Mg, TSH, Hgb A1c, LDH, and INR.   Device therapy: s/p ICD  Ischemic assessment: See #2    2. CAD. Mild to moderate CAD with severe branch disease per [facility] 11/2019. On asa and statin. No BB due to above. Continue medical management.     3. Hx of Afib w/ RVR and aberrancy vs. shelter vs. AT vs. Slow VT. S/p DCCV on 2/28/21 back into sinus rhythm. Sinus bradycardia with increased RV pacing in 2/2022 so increased lower pacer rate from 40 to 60 and turned on rate response. Continued on amiodarone 200 mg daily. On warfarin.    4. HTN. As as noted above we will continue amlodipine 10 mg daily hydralazine 100 g 3 times daily and add long-acting isosorbide 30 mg once a day for blood pressure control.  Side effect  discussed.  We will not add any ACE inhibitor RIP or Arni given persistent elevated creatinine and worsening renal function overall.  We will continue to monitor closely.    5. Chronic MSSA drive line infection. Continued on chronic suppressive therapy with cefadroxil 500 mg BID.  No changes to the    6. CKD, stage III. Over last ~6 months, creat has been running 1.3-1.8. Repeat BMP today shows creat of 2.1.  Follows with nephrology.  Exactly same creatinine today here at [facility].  We will continue to monitor closely  7. Anemia resolved  8. 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 On coumadin. Plts stable.   9. IgG Kappa monoclonal gammopathy of undetermined significance. Followed by heme/onc  10. DM2. Per PCP    Reiterated the importance of following a low sodium diet, daily weights, and activity as tolerated. Educated to call the HF Clinic for weight gain of 3 lbs/day or 5 lbs/week, or increased shortness of breath, increased swelling or any questions/concerns.    I appreciate the opportunity to participate in the care of Eliseo Tanner . Please do not hesitate to contact me with any further questions.    Sincerely,   Nader Cisneros MD  [facility] Division of Cardiology

## 2022-05-03 NOTE — PATIENT INSTRUCTIONS
Medications:  START Imdur 30mg once daily  START hydrochlorothiazide 50mg daily for three days     Instructions:  You may experience a headache the first three days. If you continue to experience a headache, please call VAD CC.   Please call Gladys in three days with you weights.   Check BMP next week with INR     Follow-up: (make these appointments before you leave)  1. Please follow-up with VAD LISA in 3 months with labs prior.   2. Please follow-up with Dr. Cisneros in 6 months with labs prior.        Page the VAD Coordinator on call if you gain more than 3 lb in a day or 5 in a week. Please also page if you feel unwell or have alarms.   Great to see you in clinic today. To Page the VAD Coordinator on call, dial [phone] option #4 and ask to speak to the VAD coordinator on call.

## 2022-05-03 NOTE — PROGRESS NOTES
ANTICOAGULATION MANAGEMENT     Eliseo Tanner 66 year old male is on warfarin with therapeutic INR result. (Goal INR 1.8-2.3)    Recent labs: (last 7 days)     05/03/22  0943   INR 1.86*       ASSESSMENT       Source(s): Patient/Caregiver Call       Warfarin doses taken: Warfarin taken as instructed    Diet: No new diet changes identified    New illness, injury, or hospitalization: No    Medication/supplement changes: None noted    Signs or symptoms of bleeding or clotting: No    Previous INR: Subtherapeutic    Additional findings: None       PLAN     Recommended plan for no diet, medication or health factor changes affecting INR     Dosing Instructions: continue your current warfarin dose with next INR in 1 week       Summary  As of 5/3/2022    Full warfarin instructions:  4 mg every Tue, Thu, Sat; 2 mg all other days   Next INR check:  5/10/2022             Telephone call with  spouse Veronique  who verbalizes understanding and agrees to plan and who agrees to plan and repeated back plan correctly    Patient using outside facility for labs    Education provided: None required    Plan made per [facility] anticoagulation protocol and per LVAD protocol    Luiza Perkins RN  Anticoagulation Clinic  5/3/2022    _______________________________________________________________________     Anticoagulation Episode Summary     Current INR goal:     TTR:  58.7 % (1 y)   Target end date:  Indefinite   Send INR reminders to:  ANTICOAG LVAD    Indications    LVAD (left ventricular assist device) present (H) [Z95.811]  Chronic systolic congestive heart failure (H) [I50.22]  Paroxysmal atrial fibrillation (H) [I48.0]           Comments:  INR GOAL RANGE CHANGE 8/17/21 1.8-2.3 On Amiodarone drinks 2-3 boosts/week. HM 3  placed 2/18/2020, 81mg ASA,         Anticoagulation Care Providers     Provider Role Specialty Phone number    Nader Cisneros MD Referring Cardiovascular Disease [phone]

## 2022-05-03 NOTE — NURSING NOTE
Chief Complaint   Patient presents with     Follow Up     Return VAD- VAD return, labs, PFT, and ICD prior     Vitals were taken, medications reconciled, and MAP was recorded.    Georges Mike, SANGITA  11:29 AM

## 2022-05-03 NOTE — NURSING NOTE
MCS VAD Pump Info     Row Name 05/03/22 1134             MCS VAD Information    Implant LVAD      LVAD Pump HeartMate 3              Heartmate 3 LEFT VS    Flow (Lpm) 4.8 Lpm      Pulse Index (PI) 3.2 PI      Speed (rpm) 5550 rpm      Power (muhammad) 4.5 muhammad      Current Hct setting 42      Retired: Unexpected Alarms --              Heartware LEFT (centrifugal flow) VS    Flow (Lpm) --      Flow waveform PEAK --      Flow waveform TROUGH --      Speed (rpm) --      Power (muhammad) --      Current Hct setting --      Retired: Unexpected Alarms --              Primary Controller    Serial number [facility] [id number]      Low flow alarm setting --      High watt alarm setting --      EBB: Patient use 23      Replace in 5 Months              Backup Controller    Serial number [facility] [id number]      EBB: Patient use --      Replace EBB in 5 Months      Speed & HCT match primary controller --              VAD Interrogation    Alarms reported by patient N      Unexpected alarms noted upon interrogation None      PI events Occasional;w/ associated speed drops  history back to 4/24 0230 PI 1.6-5.8      Damage to equipment is noted Y  self test preformed on primary controller, odd irregular audible alarm, primary controller changed      Action taken Reviewed proper equipment care and maintenance;Equipment replaced (see orders)  EBB replaced due to 5 months              Driveline Exit Site    Dressing change done N      Driveline properly secured Yes      DLES assessment drainage  baseline unchanged, no erythema, edema, fever, chills      Dressing used Daily kit      Frequency patient changes dressing Daily      Dressing modifications --      Dressing change supplier --                    4)  Education Complete: Yes   Charge the BACKUP controller s backup battery every 6 months  Perform a self test on BACKUP every 6 months  Change the MPU s batteries every 6 months:Yes  Have equipment serviced yearly (if applicable):Yes     self test performed  on primary controller, odd irregular audible alarm, primary controller changed, controller SN [facility] [id number] EBB [facility] [id number].     Replaced EBB on back up controller related to only have 5 months left, new EBB [facility] [id number]

## 2022-05-03 NOTE — PATIENT INSTRUCTIONS
It was a pleasure to see you in clinic today.  Please do not hesitate to call with any questions or concerns.  You are scheduled for a remote transmission on 8/3/2022.  We look forward to seeing you in clinic at your next device check in 6 months.    Reilly Novak, RN  Electrophysiology Nurse Clinician  [facility]    During Business Hours Please Call:  [phone]  After Hours Please Call:  [phone] - select option #4 and ask for job code [phone]

## 2022-05-03 NOTE — PROGRESS NOTES
May 2, 2022     Eliseo Tanner is a 66yr old male with a past medical history of chronic systolic heart failure, Stage D, Class III, nonischemic cardiomyopathy, now s/p HM3 implant on 2/18/2020, HTN, GERD, ABHINAV on CPAP, DM2 and CKD3.     His HM3 post-op course was complicated by retrosternal hematoma and bleeding in the lungs, RV failure, VT and Aflutter w/RVR s/p DCCV. He had pre-op proteus and enterococcus bacteremia from 2/13/2020, s/p abx. He had an ICD placed on 3/16/2020 just before his discharge from the hospital. Readmitted in 11/2020 with MSSA bacteremia. Abdominal imaging (US and CT abd/pelvis) revealed no intraabdominal fluid collection concerning for abscess/infectious process. TTE did not comment on vegetation. CHAMP also did not show any signs of endocarditis. Last positive BCx on 11/14/20 showed MSSA, and abx were stopped on 12/27/20.   Readmitted in 02/2021 for mixed cardiogenic and distributive shock with an intermittent wide complex tachycardia (thought to be 2/2 to 3:1 aflutter with aberrant conduction). 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 Initially required high doses of vasopressors and inotropes and was intubated for hypoxemia. LDH was severely elevated, but thought to be related to legionella rather than actual pump thrombus. Had acute liver injury with LFTs in the several thousands. Also had BERNARDA, which required CRRT. He was treated for legionella pneumonia and ultimately recovered relatively well and was discharged in stable condition not requiring dialysis (cession of HD 3/10/21).  Was seen in the HF clinic at [facility] in 04/2021, at which time he was feeling ok at that time. Had some mild dizziness if changing positions too quickly. No changes were made. He did reports mild brown discharge from the drive line site and increased nose bleeds. His INR goal was decreased to 1.8-2.3. He was seen 6 months ago in our clinic and was doing well.   In January of 2022 patient tested positive for  Covid-19. In February he began feeling more fatigued, increased shortness of breath and reported chills. He underwent further work-up at the [facility] with right heart cath, echo and driveline cultures. Right heart cath revealed elevated filling pressures, echo stable from previous and cultures negative.  Overall he has been doing well recently without any new complaints.  No dizziness lightheadedness palpitations.  There is some ongoing drainage from the driveline site which has been unchanged.  He is taking the antibiotics.  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  SOCIAL HISTORY:  Social History     Socioeconomic History     Marital status:    Tobacco Use     Smoking status: Former Smoker     Quit date: 1994     Years since quittin.1     Smokeless tobacco: Never Used   Substance and Sexual Activity     Alcohol use: Not Currently     Drug use: Not Currently     CURRENT MEDICATIONS:  Current Outpatient Medications   Medication     allopurinol (ZYLOPRIM) 100 MG tablet     amiodarone (PACERONE) 200 MG tablet     amLODIPine (NORVASC) 5 MG tablet     aspirin (ASA) 81 MG EC tablet     atorvastatin (LIPITOR) 20 MG tablet     B Complex-C-Folic Acid (VIRT-CAPS) 1 MG CAPS     bumetanide (BUMEX) 1 MG tablet     cefadroxil (DURICEF) 500 MG capsule     co-enzyme Q-10 200 MG CAPS     finasteride (PROSCAR) 5 MG tablet     FLUoxetine (PROZAC) 10 MG capsule     hydrALAZINE (APRESOLINE) 50 MG tablet     insulin glargine (BASAGLAR KWIKPEN) 100 UNIT/ML pen     insulin pen needle (BD JAIME U/F) 32G X 4 MM miscellaneous     magnesium oxide (MAG-OX) 400 MG  "tablet     nitroGLYcerin (NITROSTAT) 0.4 MG sublingual tablet     omeprazole (PRILOSEC) 20 MG DR capsule     potassium chloride ER (KLOR-CON M) 20 MEQ CR tablet     sacubitril-valsartan (ENTRESTO) 49-51 MG per tablet     senna-docusate (SENOKOT-S/PERICOLACE) 8.6-50 MG tablet     tamsulosin (FLOMAX) 0.4 MG capsule     warfarin ANTICOAGULANT (COUMADIN) 2 MG tablet     warfarin ANTICOAGULANT (COUMADIN) 4 MG tablet     zolpidem (AMBIEN) 5 MG tablet     No current facility-administered medications for this visit.     ROS:   Constitutional: No fever, chills, or sweats. .  ENT: No visual disturbance, ear ache, epistaxis, sore throat.   Allergies/Immunologic: Negative.   Respiratory: No cough, hemoptysis.   Cardiovascular: As per HPI.   GI: No nausea, vomiting, hematemesis, melena, or hematochezia.   : No urinary frequency, dysuria, or hematuria.   Integument: Negative.   Psychiatric: Pleasant, no major depression noted  Neuro: No focal neurological deficits noted  Endocrinology: Negative.   Musculoskeletal: As per HPI.      EXAM:  BP (!) 85/0 (BP Location: Right arm, Patient Position: Chair, Cuff Size: Adult Regular)   Pulse 67   Ht 1.702 m (5' 7\")   Wt 97.4 kg (214 lb 11.2 oz)   SpO2 94%   BMI 33.63 kg/m    Constitutional: NAD  Neck: JVD to mid neck   cardiovascular: Continuous LVAD hum  Pulmonary: Lungs clear to auscultation bilaterally   Abdominal: Soft, NT, ND, +BS. Drive line dressing CDI  Musculoskeletal: No gross deformities   Extremities: Warm.  1+ edema   Skin: Pink, warm, dry  Neurological: A&O x3, grossly non-focal   Psych: pleasant     HM3 Parameters:  Speed: 5500 rpm / Lower speed limit: 5300  Flow: 4.5 LPM  Pulsatility Index: 3.5  Power: 4.6 muhammad  No low flow alarms, PI events noted     Labs:  Lab Results   Component Value Date    WBC 7.9 02/10/2022    HGB 13.0 (L) 02/10/2022    HCT 41.4 02/10/2022     02/10/2022     02/10/2022    POTASSIUM 4.2 02/10/2022    CHLORIDE 102 04/26/2022    CO2 " 21 02/10/2022    BUN 31 (H) 02/10/2022    CR 1.29 (H) 02/10/2022     (H) 02/10/2022    SED 72 (H) 02/16/2021    DD 2.75 (H) 02/10/2022    NTBNPI 27,781 (H) 02/15/2021    NTBNP 4,416 (H) 02/10/2022    TROPI 0.377 (HH) 02/15/2021    AST 54 (H) 02/10/2022    ALT 55 02/10/2022    ALKPHOS 177 (H) 02/10/2022    BILITOTAL 0.4 02/10/2022    INR 1.7 (H) 04/26/2022     Echo 2/22/2021: LVEF 15-20%; RV appears at least moderate to severely reduced and grossly dilated; aortic valve opens intermittently   CHAMP 3/3/2021: LVEF 10-20%; moderate to severe RV dilation with severely reduced RV function; aortic valve opens partially with each beat, mild to moderate AI, mild to moderatd MR  Echo 2/10/2022: LVEF 10-15%, moderate RV dilated and moderately reduced function, aortic valve opens with each beat, trace AI, mild MR    RHC 2/13/2022:  RA: 15  RV: 63/15  PA: 62/22 (35)  PCWP: 20  TD CI/CO: 2.59/5.39  Jane CI/CO: 2.17/4.53  PVR: 2.8 HUSAIN     ASSESSMENT AND PLAN:  In summary, patient is a 66 year old male with above past medical history, nonischemic cardiomyopathy status post HeartMate 3 implantation on 2/18/2020 as destination therapy who is here for follow-up.  Overall has been doing well denies any new complaints or issues.  After lengthy discussion we decided to add Imdur 30 mg once a day given ongoing persistent hypertension and being on hydralazine 100 mg 3 times daily as well as amlodipine 10 mg daily.  All side effects including headache discussed and he will try to push it through for 3 days.  In addition we will add HCTZ 50 mg for 3 days given volume overload     1. Chronic systolic heart failure. NICM. S/p HM3 implant on 2/18/2020 as DT  Device function: no PI events/power spikes. Current VAD parameters acceptable. Obtain LDH today.  Functional status: stable. He is currently NYHA FC III.   RV function: clinically stable, echo 2/2022 at  with moderate RV dilation and moderate dysfunction  Drive-line: Daily dressing  changes and stable drainage per wife.   On chronic suppressive therapy with cefadroxil 500 mg BID.   Anticoagulation: INR managed by [facility] team. Goal INR 1.8-2.3. Therapeutic range decreased due to frequent nose bleeds.   Antiplatelet Therapy: Continue ASA 81 mg daily.   Beta-blocker: None due to history of shock.  ACEi: On lisinopril 10 mg daily, continue   Aldosterone antagonist: None. COntionue to hold given fluctuating creatinine  Vasodilators: On hydralazine 100 mg TID and to just add Imdur 30 mg daily.  Side effects discussed  Volume status: Appears somewhat volume overloaded today.  Does have ongoing lower extremity edema.  As such we decided to continue Bumex 3 mg at this time and we will add HCTZ 50 mg for 3 days.  We will have labs checked next week as well.  Will not change baseline Bumex however in the future may add further doses of HCTZ as needed.  Obtain CBC with diff, CMP, BNP, Mg, TSH, Hgb A1c, LDH, and INR.   Device therapy: s/p ICD  Ischemic assessment: See #2    2. CAD. Mild to moderate CAD with severe branch disease per [facility] 11/2019. On asa and statin. No BB due to above. Continue medical management.     3. Hx of Afib w/ RVR and aberrancy vs. FPC vs. AT vs. Slow VT. S/p DCCV on 2/28/21 back into sinus rhythm. Sinus bradycardia with increased RV pacing in 2/2022 so increased lower pacer rate from 40 to 60 and turned on rate response. Continued on amiodarone 200 mg daily. On warfarin.    4. HTN. As as noted above we will continue amlodipine 10 mg daily hydralazine 100 g 3 times daily and add long-acting isosorbide 30 mg once a day for blood pressure control.  Side effect discussed.  We will not add any ACE inhibitor RIP or Arni given persistent elevated creatinine and worsening renal function overall.  We will continue to monitor closely.    5. Chronic MSSA drive line infection. Continued on chronic suppressive therapy with cefadroxil 500 mg BID.  No changes to the    6. CKD, stage III. Over last  ~6 months, creat has been running 1.3-1.8. Repeat BMP today shows creat of 2.1.  Follows with nephrology.  Exactly same creatinine today here at [facility].  We will continue to monitor closely  7. Anemia resolved  8. 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 On coumadin. Plts stable.   9. IgG Kappa monoclonal gammopathy of undetermined significance. Followed by heme/onc  10. DM2. Per PCP    Reiterated the importance of following a low sodium diet, daily weights, and activity as tolerated. Educated to call the HF Clinic for weight gain of 3 lbs/day or 5 lbs/week, or increased shortness of breath, increased swelling or any questions/concerns.    I appreciate the opportunity to participate in the care of Eliseo Tanner . Please do not hesitate to contact me with any further questions.    Sincerely,   Nader Cisneros MD  [facility] Division of Cardiology

## 2022-05-04 LAB
DLCOCOR-%PRED-PRE: 79 %
DLCOCOR-PRE: 18.96 ML/MIN/MMHG
DLCOUNC-%PRED-PRE: 77 %
DLCOUNC-PRE: 18.46 ML/MIN/MMHG
DLCOUNC-PRED: 23.9 ML/MIN/MMHG
ERV-%PRED-PRE: 54 %
ERV-PRE: 0.32 L
ERV-PRED: 0.59 L
EXPTIME-PRE: 8.64 SEC
FEF2575-%PRED-PRE: 74 %
FEF2575-PRE: 1.77 L/SEC
FEF2575-PRED: 2.37 L/SEC
FEFMAX-%PRED-PRE: 84 %
FEFMAX-PRE: 6.67 L/SEC
FEFMAX-PRED: 7.89 L/SEC
FEV1-%PRED-PRE: 82 %
FEV1-PRE: 2.47 L
FEV1FEV6-PRE: 75 %
FEV1FEV6-PRED: 78 %
FEV1FVC-PRE: 73 %
FEV1FVC-PRED: 77 %
FEV1SVC-PRE: 76 %
FEV1SVC-PRED: 70 %
FIFMAX-PRE: 5.58 L/SEC
FRCPLETH-%PRED-PRE: 80 %
FRCPLETH-PRE: 2.83 L
FRCPLETH-PRED: 3.5 L
FVC-%PRED-PRE: 86 %
FVC-PRE: 3.37 L
FVC-PRED: 3.89 L
IC-%PRED-PRE: 78 %
IC-PRE: 2.91 L
IC-PRED: 3.69 L
RVPLETH-%PRED-PRE: 100 %
RVPLETH-PRE: 2.5 L
RVPLETH-PRED: 2.5 L
TLCPLETH-%PRED-PRE: 88 %
TLCPLETH-PRE: 5.74 L
TLCPLETH-PRED: 6.52 L
VA-%PRED-PRE: 86 %
VA-PRE: 4.99 L
VC-%PRED-PRE: 75 %
VC-PRE: 3.24 L
VC-PRED: 4.28 L

## 2022-05-06 ENCOUNTER — CARE COORDINATION (OUTPATIENT)
Dept: CARDIOLOGY | Facility: CLINIC | Age: 67
End: 2022-05-06
Payer: MEDICARE

## 2022-05-06 ENCOUNTER — ANTICOAGULATION THERAPY VISIT (OUTPATIENT)
Dept: ANTICOAGULATION | Facility: CLINIC | Age: 67
End: 2022-05-06
Payer: MEDICARE

## 2022-05-06 DIAGNOSIS — I50.22 CHRONIC SYSTOLIC CONGESTIVE HEART FAILURE (H): ICD-10-CM

## 2022-05-06 DIAGNOSIS — I48.0 PAROXYSMAL ATRIAL FIBRILLATION (H): ICD-10-CM

## 2022-05-06 DIAGNOSIS — Z95.811 LVAD (LEFT VENTRICULAR ASSIST DEVICE) PRESENT (H): Primary | ICD-10-CM

## 2022-05-06 LAB — INR HOME MONITORING: 1.8 (ref 1.8–2.3)

## 2022-05-06 NOTE — PROGRESS NOTES
ANTICOAGULATION MANAGEMENT     Eliseo Tanner 66 year old male is on warfarin with therapeutic INR result. (Goal INR 1.8-2.3)    Recent labs: (last 7 days)     05/06/22  0000   INR 1.80       ASSESSMENT       Source(s): Patient/Caregiver Call       Warfarin doses taken: Warfarin taken as instructed    Diet: No new diet changes identified    New illness, injury, or hospitalization: No    Medication/supplement changes: None noted    Signs or symptoms of bleeding or clotting: No    Previous INR: Therapeutic last visit; previously outside of goal range    Additional findings: None       PLAN     Recommended plan for no diet, medication or health factor changes affecting INR     Dosing Instructions: continue your current warfarin dose with next INR in 1 week       Summary  As of 5/6/2022    Full warfarin instructions:  4 mg every Tue, Thu, Sat; 2 mg all other days   Next INR check:  5/13/2022             Telephone call with  spouse Veronique  who verbalizes understanding and agrees to plan and who agrees to plan and repeated back plan correctly    Patient to recheck with home meter    Education provided: None required    Plan made per [facility] anticoagulation protocol and per LVAD protocol    Luiza Perkins RN  Anticoagulation Clinic  5/6/2022    _______________________________________________________________________     Anticoagulation Episode Summary     Current INR goal:     TTR:  57.9 % (1 y)   Target end date:  Indefinite   Send INR reminders to:  ANTICOAG LVAD    Indications    LVAD (left ventricular assist device) present (H) [Z95.811]  Chronic systolic congestive heart failure (H) [I50.22]  Paroxysmal atrial fibrillation (H) [I48.0]           Comments:  INR GOAL RANGE CHANGE 8/17/21 1.8-2.3 HM 3  placed 2/18/2020, 81mg ASA,  5/6/22 [employer] Home Meter         Anticoagulation Care Providers     Provider Role Specialty Phone number    Nader Cisneros MD Referring Cardiovascular Disease [phone]

## 2022-05-06 NOTE — PROGRESS NOTES
D:  Called pt to follow up to medication changes made on Tuesday.  Pt reports weights trending down: today 209, yesterday 211 and 213 the day before.  MAP's 85-87.  Pt is reporting ongoing headaches.  Today's headache better.  Pt reported dizziness first day of taking, and now just feels a little off, but is improving. Current LVAD numbers, Speed: 5500, Flow: 4.7, PI: 3.4, Power: 4.7, and are stable.  I:  Encouraged pt to monitor all these symptoms and reading closely over the weekend & notify the VAD coordinator on call if anything worsens.  Discussed plan for labs and check in Tuesday.  Will discuss findings with Dr. Blayne adams, for ongoing plan.  Pt agreeable to plan.  A:  Clinic follow up  P:  Pt verbalized understanding of the instructions given.  Will call VAD coordinator with further needs and questions.

## 2022-05-10 ENCOUNTER — TRANSFERRED RECORDS (OUTPATIENT)
Dept: HEALTH INFORMATION MANAGEMENT | Facility: CLINIC | Age: 67
End: 2022-05-10

## 2022-05-10 LAB
CREATININE (EXTERNAL): 2.6 MG/DL (ref 0.6–1.2)
GFR ESTIMATED (EXTERNAL): 25 ML/MIN/1.73M2
GFR ESTIMATED (IF AFRICAN AMERICAN) (EXTERNAL): 30 ML/MIN/1.73M2
GLUCOSE (EXTERNAL): 138 MG/DL (ref 74–106)
POTASSIUM (EXTERNAL): 4.3 MEQ/L (ref 3.5–5.1)

## 2022-05-13 ENCOUNTER — CARE COORDINATION (OUTPATIENT)
Dept: CARDIOLOGY | Facility: CLINIC | Age: 67
End: 2022-05-13
Payer: MEDICARE

## 2022-05-13 DIAGNOSIS — I50.22 CHRONIC SYSTOLIC CONGESTIVE HEART FAILURE (H): ICD-10-CM

## 2022-05-13 LAB — INR HOME MONITORING: 1.5 (ref 1.8–2.3)

## 2022-05-13 NOTE — PROGRESS NOTES
D:  Rcvd follow up labs, weights, & MAP's.  Current LVAD numbers, Speed: 5500, Flow: 4.6, PI: 3.2, Power: 4.7, & weight 208-210lbs.  BP's 89/62 and 96/65.        Pt reports his headaches have mostly resolved, and he has had no more dizziness.    I:  Discussed findings with  Dr. Cisneros.  Plan: repeat BMP in one week.  Pt advised via phone.  A:  Clinic follow up  P:  Pt verbalized understanding of the instructions given.  Will call VAD coordinator with further needs and questions.

## 2022-05-16 ENCOUNTER — ANTICOAGULATION THERAPY VISIT (OUTPATIENT)
Dept: ANTICOAGULATION | Facility: CLINIC | Age: 67
End: 2022-05-16
Payer: MEDICARE

## 2022-05-16 DIAGNOSIS — I48.0 PAROXYSMAL ATRIAL FIBRILLATION (H): ICD-10-CM

## 2022-05-16 DIAGNOSIS — Z95.811 LVAD (LEFT VENTRICULAR ASSIST DEVICE) PRESENT (H): Primary | ICD-10-CM

## 2022-05-16 DIAGNOSIS — I50.22 CHRONIC SYSTOLIC CONGESTIVE HEART FAILURE (H): ICD-10-CM

## 2022-05-16 RX ORDER — ASCORBIC ACID, THIAMINE MONONITRATE,RIBOFLAVIN, NIACINAMIDE, PYRIDOXINE HYDROCHLORIDE, FOLIC ACID, CYANOCOBALAMIN, BIOTIN, CALCIUM PANTOTHENATE, 100; 1.5; 1.7; 20; 10; 1; 6000; 150000; 5 MG/1; MG/1; MG/1; MG/1; MG/1; MG/1; UG/1; UG/1; MG/1
1 CAPSULE, LIQUID FILLED ORAL DAILY
Qty: 30 CAPSULE | Refills: 0 | Status: ON HOLD | OUTPATIENT
Start: 2022-05-16 | End: 2023-01-01

## 2022-05-16 NOTE — TELEPHONE ENCOUNTER
Renal Caps 1 mg capsule      Last Written Prescription Date:  7/11/21  Last Fill Quantity: 100,   # refills: 3  Last Office Visit : Nader Cisneros MD  Cardiovascular Disease  5/3/2022  [facility] Office visit:  8/16/22    Routing refill request to provider for review/approval because:  Drug not on cardiology refill protocol

## 2022-05-16 NOTE — PROGRESS NOTES
ANTICOAGULATION MANAGEMENT     Eliseo Tanner 66 year old male is on warfarin with subtherapeutic INR result. (Goal INR 1.8-2.3)    Recent labs: (last 7 days)     05/13/22  0000   INR 1.50*       ASSESSMENT       Source(s): Patient/Caregiver Call       Warfarin doses taken: Warfarin taken as instructed    Diet: No new diet changes identified    New illness, injury, or hospitalization: No    Medication/supplement changes: None noted    Signs or symptoms of bleeding or clotting: No    Previous INR: Therapeutic last 2(+) visits    Additional findings: None       PLAN     Recommended plan for no diet, medication or health factor changes affecting INR     Dosing Instructions: Increase your warfarin dose (10% change) with next INR in 1 week       Summary  As of 5/16/2022    Full warfarin instructions:  2 mg every Sun, Wed, Fri; 4 mg all other days   Next INR check:  5/20/2022             Telephone call with Eliseo who verbalizes understanding and agrees to plan and who agrees to plan and repeated back plan correctly    Patient to recheck with home meter    Education provided: None required    Plan made with [facility] Pharmacist Tamiko Rosas and per LVAD protocol    Luiza Perkins RN  Anticoagulation Clinic  5/16/2022    _______________________________________________________________________     Anticoagulation Episode Summary     Current INR goal:     TTR:  55.5 % (1 y)   Target end date:  Indefinite   Send INR reminders to:  ANTICOAG LVAD    Indications    LVAD (left ventricular assist device) present (H) [Z95.811]  Chronic systolic congestive heart failure (H) [I50.22]  Paroxysmal atrial fibrillation (H) [I48.0]           Comments:  INR GOAL RANGE CHANGE 8/17/21 1.8-2.3 HM 3  placed 2/18/2020, 81mg ASA,  5/6/22 [employer] Home Meter         Anticoagulation Care Providers     Provider Role Specialty Phone number    Nader Cisneros MD Referring Cardiovascular Disease [phone]

## 2022-05-19 LAB
ANION GAP SERPL CALC-SCNC: 15 MMOL/L
BUN SERPL-MCNC: 55 MG/DL
CALCIUM (EXTERNAL): 8.7 MG/DL
CHLORIDE (EXTERNAL): 101 MMOL/L
CO2 (EXTERNAL): 24 MMOL/L
CREATININE (EXTERNAL): 2.4 MG/DL
GFR ESTIMATED (EXTERNAL): 27 ML/MIN/1.73M2
GFR ESTIMATED (IF AFRICAN AMERICAN) (EXTERNAL): 33 ML/MIN/1.73M2
GLUCOSE (EXTERNAL): 103 MG/DL (ref 70–99)
POTASSIUM (EXTERNAL): 4 MMOL/L
SODIUM (EXTERNAL): 136 MMOL/L

## 2022-05-23 ENCOUNTER — CARE COORDINATION (OUTPATIENT)
Dept: CARDIOLOGY | Facility: CLINIC | Age: 67
End: 2022-05-23
Payer: MEDICARE

## 2022-05-23 NOTE — PROGRESS NOTES
D:  Follow up labs rcvd.  Creatine improved.  Pt reports feeling well.  Current LVAD numbers, Speed: 5500, Flow: 4.8, PI: 3.3, Power: 4.0, & weight 209.  All parameters stable.  Pt continues to report only mild dizziness in the morning, with position changes, that resolves as the morning goes on.   I:  Discussed results and situation with Dr. Cisneros.  Plan: Repeat BMP in 1 week.  Pt advised.  A:  Follow up - Labs  P:  Pt verbalized understanding of the instructions given.  Will call VAD coordinator with further needs and questions.    **Pt on a trip to [place] until Tuesday.      Resources given for implanting centers:  [facility]  Type: Implanting Center  [facility] [place] [address] [place]  Emergency Contact: [phone] toll free [phone]  Emergency Contact Instructions:    [facility]  Type: Implanting Center  [address] [place] [address] [place]  Phone: [phone]    [facility]  Type: Implanting Center  [address] [place] [address] [place]  Emergency Contact: [phone]  Emergency Contact Instructions: Ask  for the VAD Coordinator on-call

## 2022-05-24 ENCOUNTER — TELEPHONE (OUTPATIENT)
Dept: ANTICOAGULATION | Facility: CLINIC | Age: 67
End: 2022-05-24
Payer: MEDICARE

## 2022-05-24 NOTE — TELEPHONE ENCOUNTER
Patient calls stating he was out of town and now is unable to find his INR home monitoring kit supplies. He will go into his local lab tomorrow for an INR check.     PACO MARQUEZ RN-BC, [facility]  Anticoagulation Clinic  [phone]

## 2022-05-26 ENCOUNTER — ANTICOAGULATION THERAPY VISIT (OUTPATIENT)
Dept: ANTICOAGULATION | Facility: CLINIC | Age: 67
End: 2022-05-26
Payer: MEDICARE

## 2022-05-26 DIAGNOSIS — I48.0 PAROXYSMAL ATRIAL FIBRILLATION (H): ICD-10-CM

## 2022-05-26 DIAGNOSIS — I50.22 CHRONIC SYSTOLIC CONGESTIVE HEART FAILURE (H): ICD-10-CM

## 2022-05-26 DIAGNOSIS — Z95.811 LVAD (LEFT VENTRICULAR ASSIST DEVICE) PRESENT (H): Primary | ICD-10-CM

## 2022-05-26 NOTE — PROGRESS NOTES
ANTICOAGULATION MANAGEMENT     Eliseo Tanner 66 year old male is on warfarin with subtherapeutic INR result. (Goal INR 1.8 - 2.3)    Recent labs: (last 7 days)     05/25/22  0935   INR 1.5*       ASSESSMENT       Source(s): Chart Review and Patient/Caregiver Call       Warfarin doses taken: Warfarin taken as instructed--states might have missed part of a dose     Diet: was on vacation-states may have eaten a little different, but did not increase greens    New illness, injury, or hospitalization: No    Medication/supplement changes: None noted    Signs or symptoms of bleeding or clotting: No    Previous INR: Subtherapeutic    Additional findings: None       PLAN     Recommended plan for temporary change(s) affecting INR     Dosing Instructions: booster dose then continue your current warfarin dose with next INR in 1 week       Summary  As of 5/26/2022    Full warfarin instructions:  5/26: 6 mg; Otherwise 2 mg every Sun, Wed, Fri; 4 mg all other days   Next INR check:  6/2/2022             Telephone call with Eliseo who agrees to plan and repeated back plan correctly    Patient using outside facility for labs    Education provided: Please call back if any changes to your diet, medications or how you've been taking warfarin and Contact [phone] with any changes, questions or concerns.     Plan made with [facility] Pharmacist Tamiko Rosas and per LVAD protocol    Krysta Mcdaniel RN  Anticoagulation Clinic  5/26/2022    _______________________________________________________________________     Anticoagulation Episode Summary     Current INR goal:     TTR:  52.5 % (1 y)   Target end date:  Indefinite   Send INR reminders to:  ANTICOAG LVAD    Indications    LVAD (left ventricular assist device) present (H) [Z95.811]  Chronic systolic congestive heart failure (H) [I50.22]  Paroxysmal atrial fibrillation (H) [I48.0]           Comments:  INR GOAL RANGE CHANGE 8/17/21 1.8-2.3 HM 3  placed 2/18/2020, 81mg ASA,  5/6/22 [employer]  Home Meter         Anticoagulation Care Providers     Provider Role Specialty Phone number    Nader Cisneros MD Referring Cardiovascular Disease [phone]

## 2022-06-02 ENCOUNTER — ANTICOAGULATION THERAPY VISIT (OUTPATIENT)
Dept: ANTICOAGULATION | Facility: CLINIC | Age: 67
End: 2022-06-02
Payer: MEDICARE

## 2022-06-02 DIAGNOSIS — I48.0 PAROXYSMAL ATRIAL FIBRILLATION (H): ICD-10-CM

## 2022-06-02 DIAGNOSIS — Z95.811 LVAD (LEFT VENTRICULAR ASSIST DEVICE) PRESENT (H): Primary | ICD-10-CM

## 2022-06-02 DIAGNOSIS — I50.22 CHRONIC SYSTOLIC CONGESTIVE HEART FAILURE (H): ICD-10-CM

## 2022-06-02 LAB — INR HOME MONITORING: 2.1 (ref 1.8–2.3)

## 2022-06-02 NOTE — PROGRESS NOTES
ANTICOAGULATION MANAGEMENT     Eliseo Tanner 66 year old male is on warfarin with therapeutic INR result. (Goal INR 1.8-2.3)    Recent labs: (last 7 days)     06/02/22  0000   INR 2.10       ASSESSMENT       Source(s): Chart Review and Patient/Caregiver Call       Warfarin doses taken: Warfarin taken as instructed    Diet: No new diet changes identified    New illness, injury, or hospitalization: No    Medication/supplement changes: None noted    Signs or symptoms of bleeding or clotting: No    Previous INR: Subtherapeutic    Additional findings: None       PLAN     Recommended plan for no diet, medication or health factor changes affecting INR     Dosing Instructions: continue your current warfarin dose with next INR in 1-2 weeks    Summary  As of 6/2/2022    Full warfarin instructions:  2 mg every Sun, Wed, Fri; 4 mg all other days   Next INR check:  6/16/2022             Telephone call with  Julee who verbalizes understanding and agrees to plan and who agrees to plan and repeated back plan correctly    Patient to recheck with home meter    Education provided: Goal range and significance of current result and Contact [phone] with any changes, questions or concerns.     Plan made per [facility] anticoagulation protocol and per LVAD protocol    PACO MARQUEZ RN  Anticoagulation Clinic  6/2/2022    _______________________________________________________________________     Anticoagulation Episode Summary     Current INR goal:     TTR:  51.0 % (1 y)   Target end date:  Indefinite   Send INR reminders to:  ANTICOAG LVAD    Indications    LVAD (left ventricular assist device) present (H) [Z95.811]  Chronic systolic congestive heart failure (H) [I50.22]  Paroxysmal atrial fibrillation (H) [I48.0]           Comments:  INR GOAL RANGE CHANGE 8/17/21 1.8-2.3   HM 3 placed 2/18/2020, 81mg ASA,  5/6/22 [employer] Home Meter  Ok to speak with spouse Veronique Ph [phone]         Anticoagulation Care Providers     Provider  Role Specialty Phone number    Nader Cisneros MD Referring Cardiovascular Disease [phone]

## 2022-06-08 ENCOUNTER — TELEPHONE (OUTPATIENT)
Dept: INFECTIOUS DISEASES | Facility: CLINIC | Age: 67
End: 2022-06-08
Payer: MEDICARE

## 2022-06-08 DIAGNOSIS — T82.7XXA INFECTION ASSOCIATED WITH DRIVELINE OF LEFT VENTRICULAR ASSIST DEVICE (LVAD) (H): ICD-10-CM

## 2022-06-08 RX ORDER — CEFADROXIL 500 MG/1
500 CAPSULE ORAL 2 TIMES DAILY
Qty: 120 CAPSULE | Refills: 1 | Status: SHIPPED | OUTPATIENT
Start: 2022-06-08 | End: 2022-06-09

## 2022-06-08 NOTE — TELEPHONE ENCOUNTER
[facility] Call Center    Phone Message    May a detailed message be left on voicemail: yes     Reason for Call: Medication Refill Request    Has the patient contacted the pharmacy for the refill? Yes     Name of medication being requested:   * cefadroxil (DURICEF) 500 MG capsule    Provider who prescribed the medication: Magy    Pharmacy: [id number] - [address]    Date medication is needed: 6/8/2022     Patient has no more refills on medication. Patient has a week worth left    Action Taken: Message routed to:  [facility] ([facility] ID    Travel Screening: Not Applicable

## 2022-06-09 ENCOUNTER — CARE COORDINATION (OUTPATIENT)
Dept: CARDIOLOGY | Facility: CLINIC | Age: 67
End: 2022-06-09
Payer: MEDICARE

## 2022-06-09 DIAGNOSIS — I50.22 CHRONIC SYSTOLIC CONGESTIVE HEART FAILURE (H): ICD-10-CM

## 2022-06-09 RX ORDER — HYDRALAZINE HYDROCHLORIDE 100 MG/1
100 TABLET, FILM COATED ORAL 3 TIMES DAILY
Qty: 270 TABLET | Refills: 3 | Status: SHIPPED | OUTPATIENT
Start: 2022-06-09 | End: 2023-01-01

## 2022-06-09 RX ORDER — ISOSORBIDE MONONITRATE 60 MG/1
60 TABLET, EXTENDED RELEASE ORAL DAILY
Qty: 90 TABLET | Refills: 3 | Status: SHIPPED | OUTPATIENT
Start: 2022-06-09 | End: 2022-06-17

## 2022-06-09 RX ORDER — CEFADROXIL 500 MG/1
500 CAPSULE ORAL 2 TIMES DAILY
Qty: 60 CAPSULE | Refills: 3 | Status: SHIPPED | OUTPATIENT
Start: 2022-06-09 | End: 2022-01-01

## 2022-06-09 NOTE — PROGRESS NOTES
Follow up labs rcvd from outside lab.  Pt reports current LVAD numbers, Speed: 5500, Flow: 4.7, PI: 3.5, Power: 4.6, & weight 210-213. MAPs 88/89  Other symptoms intermittent dizziness that resolves on its own.  Discussed with Dr. Cisneros.  Plan: STOP Entresto, INCREASE Imdur 60 mg daily & Hydralazine 100 mg TID.  Pt advised  Pt verbalized understanding of the instructions given.  Will call VAD coordinator with further needs and questions.

## 2022-06-16 ENCOUNTER — ANTICOAGULATION THERAPY VISIT (OUTPATIENT)
Dept: ANTICOAGULATION | Facility: CLINIC | Age: 67
End: 2022-06-16
Payer: MEDICARE

## 2022-06-16 ENCOUNTER — CARE COORDINATION (OUTPATIENT)
Dept: CARDIOLOGY | Facility: CLINIC | Age: 67
End: 2022-06-16
Payer: MEDICARE

## 2022-06-16 DIAGNOSIS — I50.22 CHRONIC SYSTOLIC CONGESTIVE HEART FAILURE (H): ICD-10-CM

## 2022-06-16 DIAGNOSIS — I48.0 PAROXYSMAL ATRIAL FIBRILLATION (H): ICD-10-CM

## 2022-06-16 DIAGNOSIS — Z95.811 LVAD (LEFT VENTRICULAR ASSIST DEVICE) PRESENT (H): Primary | ICD-10-CM

## 2022-06-16 NOTE — PROGRESS NOTES
ANTICOAGULATION MANAGEMENT     Eliseo Tanner 66 year old male is on warfarin with subtherapeutic INR result. 1.8-2.3    Recent labs: (last 7 days)     06/15/22  0932   INR 1.7*       ASSESSMENT       Source(s): Chart Review and Patient/Caregiver Call       Warfarin doses taken: Warfarin taken as instructed    Diet: No new diet changes identified    New illness, injury, or hospitalization: No    Medication/supplement changes: None noted    Signs or symptoms of bleeding or clotting: No    Previous INR: Therapeutic last visit; previously outside of goal range    Additional findings: Patient would like to continue with current warfarin pattern but is willing to take a booster dose today.        PLAN     Recommended plan for no diet, medication or health factor changes affecting INR     Dosing Instructions: booster dose then continue your current warfarin dose with next INR in 1 week       Summary  As of 6/16/2022    Full warfarin instructions:  6/16: 5 mg; Otherwise 2 mg every Sun, Wed, Fri; 4 mg all other days   Next INR check:  6/24/2022             Telephone call with Eliseo who verbalizes understanding and agrees to plan and who agrees to plan and repeated back plan correctly    Patient to recheck with home meter    Education provided: Goal range and significance of current result, Importance of therapeutic range, Importance of following up at instructed interval and Importance of taking warfarin as instructed    Plan made per [facility] anticoagulation protocol and per LVAD protocol    Gloria Abbasi RN  Anticoagulation Clinic  6/16/2022    _______________________________________________________________________     Anticoagulation Episode Summary     Current INR goal:     TTR:  48.2 % (1 y)   Target end date:  Indefinite   Send INR reminders to:  ANTICOAG LVAD    Indications    LVAD (left ventricular assist device) present (H) [Z95.811]  Chronic systolic congestive heart failure (H) [I50.22]  Paroxysmal atrial  fibrillation (H) [I48.0]           Comments:  INR GOAL RANGE CHANGE 8/17/21 1.8-2.3   HM 3 placed 2/18/2020, 81mg ASA,  5/6/22 [employer]eter  Ok to speak with spouse Veronique  [phone]         Anticoagulation Care Providers     Provider Role Specialty Phone number    Nader Cisneros MD Referring Cardiovascular Disease [phone]

## 2022-06-16 NOTE — PROGRESS NOTES
D:  Rcvd follow up labs.  Current LVAD numbers, Speed: 5500, Flow: 4.6, PI: 3.3, Power: 4.6, & weight 208 - 212.  MAP's .  Pt reports that dizziness has resolved since stopping the Entresto.  Of note, pt has been taking Bumex differently than prescribed at 3 mg BID.  Pt taking Amlodipine 10mg daily, Hydralazine 100mg TID & Imdur 60mg daily.  I:  Discussed with Dr. Cisneros.  Plan: increase Imdur to 90 mg daily, repeat labs in 2 weeks  A:  Follow up  P:  Pt verbalized understanding of the instructions given.  Will call VAD coordinator with further needs and questions.

## 2022-06-17 RX ORDER — ISOSORBIDE MONONITRATE 30 MG/1
90 TABLET, EXTENDED RELEASE ORAL DAILY
Qty: 270 TABLET | Refills: 3 | Status: ON HOLD | OUTPATIENT
Start: 2022-06-17 | End: 2023-01-01

## 2022-06-24 ENCOUNTER — CARE COORDINATION (OUTPATIENT)
Dept: CARDIOLOGY | Facility: CLINIC | Age: 67
End: 2022-06-24

## 2022-06-24 VITALS — WEIGHT: 219 LBS | BODY MASS INDEX: 34.3 KG/M2

## 2022-06-24 DIAGNOSIS — I50.22 CHRONIC SYSTOLIC CONGESTIVE HEART FAILURE (H): ICD-10-CM

## 2022-06-24 RX ORDER — HYDROCHLOROTHIAZIDE 12.5 MG/1
50 TABLET ORAL DAILY
Qty: 15 TABLET | Refills: 3 | Status: SHIPPED | OUTPATIENT
Start: 2022-06-24 | End: 2022-08-03

## 2022-06-24 NOTE — PROGRESS NOTES
D:  Pt called to report and increase in his weight from 214-219 this week.  Last known weights 208-212 (June 16).  Pt reports and increase in his abdominal fullness, and LE swelling.  Pt is short of breath and is fatigued.  Current LVAD numbers, Speed: 5500, Flow: 4.5, PI: 3.2, Power: 4.6.  Pt currently taking Bumex 3 mg BID.  Of note, MAP's remain elevated at 89-90.  I:  Discussed situation with Dr. Cisneros.  Plan: Hydrochlorothiazide 50 mg daily X3 days, labs & weight check next week.  Pt to monitor weights closely and notify oncall coordinator if weight is not trending downward.  A:  Weight gain  P:  Pt verbalized understanding of the instructions given.  Will call VAD coordinator with further needs and questions.

## 2022-06-27 ENCOUNTER — TELEPHONE (OUTPATIENT)
Dept: ANTICOAGULATION | Facility: CLINIC | Age: 67
End: 2022-06-27

## 2022-06-27 ENCOUNTER — CARE COORDINATION (OUTPATIENT)
Dept: CARDIOLOGY | Facility: CLINIC | Age: 67
End: 2022-06-27

## 2022-06-27 NOTE — TELEPHONE ENCOUNTER
ANTICOAGULATION     Eliseo Tanner is overdue for INR check.      Spoke with Eliseo's wife who declined to schedule a lab appointment at this time. If calling back please schedule as soon as possible. - states that Eliseo will be having labs completed on Thursday.     Raimundo Baldwin RN

## 2022-06-27 NOTE — PROGRESS NOTES
D:  Pt called to report weight down to 210 and that he is feeling much better. Current LVAD numbers, Speed: 5500, Flow: 4.7, PI: 3.2, Power: 4.7, which are all stable. I: Will await labs later this week confirm any further follow up needs or changes.  Pt plans to go Wed or Thurs.  Pt to call for further changes to weights.  A:  Follow up, weight's   P:  Pt verbalized understanding of the instructions given.  Will call VAD coordinator with further needs and questions.    *7/1/22, 1005  D: Follow up labs rcvd.  Per pt weight remains 210-211 and pt is feeling well.  I:  Pt notified lab values as expected per Dr. Cisneros, no further follow up needed  A:  Pt verbalized understanding  P:  Pt will call VAD coordinator with further needs and questions.

## 2022-06-30 ENCOUNTER — ANTICOAGULATION THERAPY VISIT (OUTPATIENT)
Dept: ANTICOAGULATION | Facility: CLINIC | Age: 67
End: 2022-06-30

## 2022-06-30 DIAGNOSIS — I48.0 PAROXYSMAL ATRIAL FIBRILLATION (H): ICD-10-CM

## 2022-06-30 DIAGNOSIS — Z95.811 LVAD (LEFT VENTRICULAR ASSIST DEVICE) PRESENT (H): Primary | ICD-10-CM

## 2022-06-30 DIAGNOSIS — I50.22 CHRONIC SYSTOLIC CONGESTIVE HEART FAILURE (H): ICD-10-CM

## 2022-06-30 LAB — INR HOME MONITORING: 2.4 (ref 1.8–2.3)

## 2022-06-30 NOTE — PROGRESS NOTES
ANTICOAGULATION MANAGEMENT     Eliseo Tanner 67 year old male is on warfarin with supratherapeutic INR result. (Goal INR 1.8-2.3)    Recent labs: (last 7 days)     06/30/22  0000   INR 2.4*       ASSESSMENT       Source(s): Chart Review and Patient/Caregiver Call       Warfarin doses taken: Warfarin taken as instructed    Diet: No new diet changes identified    New illness, injury, or hospitalization: No    Medication/supplement changes: None noted    Signs or symptoms of bleeding or clotting: No    Previous INR: Subtherapeutic    Additional findings: None       PLAN     Recommended plan for temporary change(s) (boost dose with last INR) affecting INR     Dosing Instructions: continue your current warfarin dose with next INR in 1 week       Summary  As of 6/30/2022    Full warfarin instructions:  2 mg every Sun, Wed, Fri; 4 mg all other days   Next INR check:  7/7/2022             Telephone call with Eliseo who verbalizes understanding and agrees to plan    Patient to recheck with home meter    Education provided: Goal range and significance of current result    Plan made with [facility] Pharmacist Tamiko Rosas (non-standard target goal range)    Bailey Hawkins RN  Anticoagulation Clinic  6/30/2022    _______________________________________________________________________     Anticoagulation Episode Summary     Current INR goal:     TTR:  46.6 % (1 y)   Target end date:  Indefinite   Send INR reminders to:  ANTICOAG LVAD    Indications    LVAD (left ventricular assist device) present (H) [Z95.811]  Chronic systolic congestive heart failure (H) [I50.22]  Paroxysmal atrial fibrillation (H) [I48.0]           Comments:  INR GOAL RANGE CHANGE 8/17/21 1.8-2.3   HM 3 placed 2/18/2020, 81mg ASA,  5/6/22 [employer]         Anticoagulation Care Providers     Provider Role Specialty Phone number    Nader Cisneros MD Referring Cardiovascular Disease [phone]          
Quality 226: Preventive Care And Screening: Tobacco Use: Screening And Cessation Intervention: Patient screened for tobacco use and is an ex/non-smoker
Quality 130: Documentation Of Current Medications In The Medical Record: Current Medications Documented
Detail Level: Detailed

## 2022-07-07 ENCOUNTER — ANTICOAGULATION THERAPY VISIT (OUTPATIENT)
Dept: ANTICOAGULATION | Facility: CLINIC | Age: 67
End: 2022-07-07

## 2022-07-07 DIAGNOSIS — Z95.811 LVAD (LEFT VENTRICULAR ASSIST DEVICE) PRESENT (H): Primary | ICD-10-CM

## 2022-07-07 DIAGNOSIS — I50.22 CHRONIC SYSTOLIC CONGESTIVE HEART FAILURE (H): ICD-10-CM

## 2022-07-07 DIAGNOSIS — I48.0 PAROXYSMAL ATRIAL FIBRILLATION (H): ICD-10-CM

## 2022-07-07 LAB — INR HOME MONITORING: 3.2 (ref 1.8–2.3)

## 2022-07-07 NOTE — PROGRESS NOTES
ANTICOAGULATION MANAGEMENT     Eliseo Tanner 67 year old male is on warfarin with supratherapeutic INR result. (Goal INR 1.8-2.3)    Recent labs: (last 7 days)     07/07/22  0000   INR 3.2*       ASSESSMENT       Source(s): Chart Review and Patient/Caregiver Call       Warfarin doses taken: Warfarin taken as instructed    Diet: Patient has some Boost drinks he needs to use up. Patient is going to drink maybe 2-3 for the next week.     New illness, injury, or hospitalization: No    Medication/supplement changes: None noted    Signs or symptoms of bleeding or clotting: No    Previous INR: Supratherapeutic    Additional findings: None       PLAN     Recommended plan for no diet, medication or health factor changes affecting INR     Dosing Instructions: decrease your warfarin dose (9% change) with next INR in 1 week       Summary  As of 7/7/2022    Full warfarin instructions:  4 mg every Mon, Wed, Fri; 2 mg all other days   Next INR check:  7/14/2022             Telephone call with Eliseo who verbalizes understanding and agrees to plan and who agrees to plan and repeated back plan correctly    Patient to recheck with home meter    Education provided: Importance of consistent vitamin K intake, Impact of vitamin K foods on INR, Goal range and significance of current result and Importance of therapeutic range    Plan made per [facility] anticoagulation protocol and per LVAD protocol    Gloria Abbasi RN  Anticoagulation Clinic  7/7/2022    _______________________________________________________________________     Anticoagulation Episode Summary     Current INR goal:     TTR:  46.3 % (1 y)   Target end date:  Indefinite   Send INR reminders to:  ANTICOAG LVAD    Indications    LVAD (left ventricular assist device) present (H) [Z95.811]  Chronic systolic congestive heart failure (H) [I50.22]  Paroxysmal atrial fibrillation (H) [I48.0]           Comments:  INR GOAL RANGE CHANGE 8/17/21 1.8-2.3   HM 3 placed 2/18/2020, 81mg  ASA,  5/6/22 [employer]eter         Anticoagulation Care Providers     Provider Role Specialty Phone number    Nader Cisneros MD Referring Cardiovascular Disease [phone]

## 2022-07-11 ENCOUNTER — DOCUMENTATION ONLY (OUTPATIENT)
Dept: ANTICOAGULATION | Facility: CLINIC | Age: 67
End: 2022-07-11

## 2022-07-11 DIAGNOSIS — Z95.811 LVAD (LEFT VENTRICULAR ASSIST DEVICE) PRESENT (H): Primary | ICD-10-CM

## 2022-07-11 DIAGNOSIS — I48.0 PAROXYSMAL ATRIAL FIBRILLATION (H): ICD-10-CM

## 2022-07-11 NOTE — PROGRESS NOTES
ANTICOAGULATION CLINIC REFERRAL RENEWAL REQUEST     An annual renewal order is required for all patients referred to [facility].?  Please review and sign the pended referral order for Eliseo Tanner.       ANTICOAGULATION SUMMARY      Warfarin indication(s)   Atrial Fibrillation and LVAD    Mechanical heart valve present?  NO       Current goal range   1.8-2.3 due to epistaxis     Goal appropriate for indication? Request provider review of goal range due to new standard lower INR goal range for [facility] patients at risk for bleeding: INR goal range 1.7-2.3     Time in Therapeutic Range (TTR)  (Goal > 60%) 46.3%       Office visit with referring provider's group within last year yes on 5/3/2022       Sarah Gaffney, PharmD, BCPS  [facility]

## 2022-07-12 ENCOUNTER — DOCUMENTATION ONLY (OUTPATIENT)
Dept: ANTICOAGULATION | Facility: CLINIC | Age: 67
End: 2022-07-12

## 2022-07-12 DIAGNOSIS — Z95.811 LVAD (LEFT VENTRICULAR ASSIST DEVICE) PRESENT (H): Primary | ICD-10-CM

## 2022-07-12 DIAGNOSIS — I50.22 CHRONIC SYSTOLIC CONGESTIVE HEART FAILURE (H): ICD-10-CM

## 2022-07-12 DIAGNOSIS — I48.0 PAROXYSMAL ATRIAL FIBRILLATION (H): ICD-10-CM

## 2022-07-12 NOTE — PROGRESS NOTES
7/12/22    Dr. Cisneros signed orders.  New goal range 1.7-2.3    Anticoagulation Order Details    INR goal:  1.7-2.3   Send INR reminders to:  Anticoag Lvad   Max. weekly warfarin dose:     Date of next INR:  7/14/2022   Target end date:  Indefinite     KRISTEN Wilburn

## 2022-07-14 ENCOUNTER — ANTICOAGULATION THERAPY VISIT (OUTPATIENT)
Dept: ANTICOAGULATION | Facility: CLINIC | Age: 67
End: 2022-07-14

## 2022-07-14 DIAGNOSIS — I50.22 CHRONIC SYSTOLIC CONGESTIVE HEART FAILURE (H): ICD-10-CM

## 2022-07-14 DIAGNOSIS — Z95.811 LVAD (LEFT VENTRICULAR ASSIST DEVICE) PRESENT (H): Primary | ICD-10-CM

## 2022-07-14 DIAGNOSIS — I48.0 PAROXYSMAL ATRIAL FIBRILLATION (H): ICD-10-CM

## 2022-07-14 LAB — INR HOME MONITORING: 2.3 (ref 1.8–2.3)

## 2022-07-14 NOTE — PROGRESS NOTES
ANTICOAGULATION MANAGEMENT     Eliseo Tanner 67 year old male is on warfarin with therapeutic INR result. (Goal INR 1.7-2.3)    Recent labs: (last 7 days)     07/14/22  0000   INR 2.3       ASSESSMENT       Source(s): Chart Review and Patient/Caregiver Call       Warfarin doses taken: Warfarin taken as instructed    Diet: No new diet changes identified    New illness, injury, or hospitalization: No    Medication/supplement changes: None noted    Signs or symptoms of bleeding or clotting: No    Previous INR: Supratherapeutic    Additional findings: None       PLAN     Recommended plan for no diet, medication or health factor changes affecting INR     Dosing Instructions: continue your current warfarin dose with next INR in 1 week       Summary  As of 7/14/2022    Full warfarin instructions:  4 mg every Mon, Wed, Fri; 2 mg all other days   Next INR check:  7/21/2022             Telephone call with Eliseo who verbalizes understanding and agrees to plan    Patient to recheck with home meter    Education provided: Reviewed recent minor change to target goal range and significance of current result    Plan made per [facility] anticoagulation protocol and per LVAD protocol    Bailey Hawkins RN  Anticoagulation Clinic  7/14/2022    _______________________________________________________________________     Anticoagulation Episode Summary     Current INR goal:  1.7-2.3   TTR:  46.7 % (1 y)   Target end date:  Indefinite   Send INR reminders to:  ANTICOAG LVAD    Indications    LVAD (left ventricular assist device) present (H) [Z95.811]  Chronic systolic congestive heart failure (H) [I50.22]  Paroxysmal atrial fibrillation (H) [I48.0]           Comments:  7/12/22 GOAL RANGE CHANGE 1.7-2.3   HM 3 placed 2/18/2020, 81mg ASA,  5/6/22 [employer]         Anticoagulation Care Providers     Provider Role Specialty Phone number    Nader Cisneros MD Referring Cardiovascular Disease [phone]

## 2022-07-17 ENCOUNTER — HEALTH MAINTENANCE LETTER (OUTPATIENT)
Age: 67
End: 2022-07-17

## 2022-07-21 ENCOUNTER — ANTICOAGULATION THERAPY VISIT (OUTPATIENT)
Dept: ANTICOAGULATION | Facility: CLINIC | Age: 67
End: 2022-07-21

## 2022-07-21 DIAGNOSIS — I50.22 CHRONIC SYSTOLIC CONGESTIVE HEART FAILURE (H): ICD-10-CM

## 2022-07-21 DIAGNOSIS — I48.0 PAROXYSMAL ATRIAL FIBRILLATION (H): ICD-10-CM

## 2022-07-21 DIAGNOSIS — Z95.811 LVAD (LEFT VENTRICULAR ASSIST DEVICE) PRESENT (H): Primary | ICD-10-CM

## 2022-07-21 LAB — INR HOME MONITORING: 2.8 (ref 1.8–2.3)

## 2022-07-21 NOTE — PROGRESS NOTES
ANTICOAGULATION MANAGEMENT     Eliseo Tanner 67 year old male is on warfarin with supratherapeutic INR result. (Goal INR 1.7-2.3)    Recent labs: (last 7 days)     07/21/22  0000   INR 2.8*       ASSESSMENT       Source(s): Chart Review and Patient/Caregiver Call       Warfarin doses taken: Warfarin taken as instructed    Diet: No new diet changes identified    New illness, injury, or hospitalization: No    Medication/supplement changes: None noted    Signs or symptoms of bleeding or clotting: No    Previous INR: Therapeutic last visit; previously outside of goal range    Additional findings: None       PLAN     Recommended plan for no diet, medication or health factor changes affecting INR     Dosing Instructions: decrease your warfarin dose (10% change) with next INR in 1 week       Summary  As of 7/21/2022    Full warfarin instructions:  4 mg every Sun, Wed; 2 mg all other days   Next INR check:  7/28/2022             Telephone call with Eliseo who verbalizes understanding and agrees to plan and who agrees to plan and repeated back plan correctly    Patient to recheck with home meter    Education provided: Goal range and significance of current result, Monitoring for bleeding signs and symptoms and Contact [phone] with any changes, questions or concerns.     Plan made per [facility] anticoagulation protocol and per LVAD protocol    PACO MARQUEZ RN  Anticoagulation Clinic  7/21/2022    _______________________________________________________________________     Anticoagulation Episode Summary     Current INR goal:  1.7-2.3   TTR:  44.8 % (1 y)   Target end date:  Indefinite   Send INR reminders to:  ANTICOAG LVAD    Indications    LVAD (left ventricular assist device) present (H) [Z95.811]  Chronic systolic congestive heart failure (H) [I50.22]  Paroxysmal atrial fibrillation (H) [I48.0]           Comments:  7/12/22 GOAL RANGE CHANGE 1.7-2.3   HM 3 placed 2/18/2020, 81mg ASA,  5/6/22 [employer]          Anticoagulation Care Providers     Provider Role Specialty Phone number    Nader Cisneros MD Referring Cardiovascular Disease [phone]

## 2022-07-22 ENCOUNTER — DOCUMENTATION ONLY (OUTPATIENT)
Dept: CARDIOLOGY | Facility: CLINIC | Age: 67
End: 2022-07-22

## 2022-07-22 ENCOUNTER — CARE COORDINATION (OUTPATIENT)
Dept: CARDIOLOGY | Facility: CLINIC | Age: 67
End: 2022-07-22

## 2022-07-22 DIAGNOSIS — I42.8 NONISCHEMIC CARDIOMYOPATHY (H): ICD-10-CM

## 2022-07-22 DIAGNOSIS — I50.22 CHRONIC SYSTOLIC CONGESTIVE HEART FAILURE (H): ICD-10-CM

## 2022-07-22 RX ORDER — BUMETANIDE 1 MG/1
4 TABLET ORAL 2 TIMES DAILY
Qty: 720 TABLET | Refills: 3 | Status: SHIPPED | OUTPATIENT
Start: 2022-07-22 | End: 2022-01-01

## 2022-07-22 RX ORDER — POTASSIUM CHLORIDE 1500 MG/1
60 TABLET, EXTENDED RELEASE ORAL 2 TIMES DAILY
Qty: 540 TABLET | Refills: 3 | Status: SHIPPED | OUTPATIENT
Start: 2022-07-22 | End: 2022-01-01

## 2022-07-22 NOTE — PROGRESS NOTES
D:  Pt called to report an increase in weight to 217 today (from 210).  Pt feels bloated, short of breath and has increased edema in his belly and legs.  Current LVAD numbers, Speed: 5500, Flow: 4.4, PI: 3.4, Power: 4.7, which are all WNL per basline.  His recent MAP's are 88-90.  I:  Discussed situation with Dr. Cisneros.  Plan: Hydrochlorothiazide 50 mg daily x 2 days, increase Bumex to 4 mg BID and Potassium to 60 mEq BID, recheck labs in 1-2 weeks.  Pt to call if weights not trending downward.  Pt advised.  A:  Weight gain  P:  Pt verbalized understanding of the instructions given.  Will call VAD coordinator with further needs and questions.

## 2022-07-22 NOTE — PROGRESS NOTES
Prior Authorization Request For Card[employer]MEM[employer] Has Been Submitted       Type (Commercial or Guide): Commercial     Requested By: Dr. Cisneros    Date: 7/22/22    Insurance: Medicare    ICD Code: I50.22, I50.23  Status APPROVED email sent to team on 8/4/22.     Notes: Message Dr. Cisneros with answer.      Paulie Ramsey [facility]  Heart Failure, Advanced Heart Failure & CORE  Referral Specialist &     [facility]  Cardiology  Office: [phone]  [address] [place] [address]

## 2022-07-28 LAB — INR HOME MONITORING: 2.3 (ref 1.8–2.3)

## 2022-07-29 ENCOUNTER — ANTICOAGULATION THERAPY VISIT (OUTPATIENT)
Dept: ANTICOAGULATION | Facility: CLINIC | Age: 67
End: 2022-07-29

## 2022-07-29 DIAGNOSIS — Z95.811 LVAD (LEFT VENTRICULAR ASSIST DEVICE) PRESENT (H): Primary | ICD-10-CM

## 2022-07-29 DIAGNOSIS — I50.22 CHRONIC SYSTOLIC CONGESTIVE HEART FAILURE (H): ICD-10-CM

## 2022-07-29 DIAGNOSIS — I48.0 PAROXYSMAL ATRIAL FIBRILLATION (H): ICD-10-CM

## 2022-07-29 NOTE — PROGRESS NOTES
ANTICOAGULATION MANAGEMENT     Eliseo Tanner 67 year old male is on warfarin with therapeutic INR result. (Goal INR 1.7-2.3)    Recent labs: (last 7 days)     07/28/22  0000   INR 2.3       ASSESSMENT       Source(s): Chart Review and Patient/Caregiver Call       Warfarin doses taken: Warfarin taken as instructed    Diet: No new diet changes identified    New illness, injury, or hospitalization: No    Medication/supplement changes: None noted    Signs or symptoms of bleeding or clotting: No    Previous INR: Supratherapeutic    Additional findings: None       PLAN     Recommended plan for no diet, medication or health factor changes affecting INR     Dosing Instructions: Continue your current warfarin dose with next INR in 1 week       Summary  As of 7/29/2022    Full warfarin instructions:  4 mg every Sun, Wed; 2 mg all other days   Next INR check:  8/4/2022             Telephone call with Eliseo who verbalizes understanding and agrees to plan and who agrees to plan and repeated back plan correctly    Patient to recheck with home meter    Education provided: Goal range and significance of current result and Contact [phone] with any changes, questions or concerns.     Plan made per [facility] anticoagulation protocol and per LVAD protocol    PACO MARQUEZ RN  Anticoagulation Clinic  7/29/2022    _______________________________________________________________________     Anticoagulation Episode Summary     Current INR goal:  1.7-2.3   TTR:  42.8 % (1 y)   Target end date:  Indefinite   Send INR reminders to:  ANTICOAG LVAD    Indications    LVAD (left ventricular assist device) present (H) [Z95.811]  Chronic systolic congestive heart failure (H) [I50.22]  Paroxysmal atrial fibrillation (H) [I48.0]           Comments:  7/12/22 GOAL RANGE CHANGE 1.7-2.3   HM 3 placed 2/18/2020, 81mg ASA,  5/6/22 [employer]         Anticoagulation Care Providers     Provider Role Specialty Phone number    Nader Cisneros MD Referring  Cardiovascular Disease [phone]

## 2022-08-01 ENCOUNTER — TELEPHONE (OUTPATIENT)
Dept: CARDIOLOGY | Facility: CLINIC | Age: 67
End: 2022-08-01

## 2022-08-01 NOTE — TELEPHONE ENCOUNTER
----- Message from [place] sent at 2/10/2022  9:40 AM CST -----    Follow-up: (make these appointments before you leave)  1. Please follow-up with VAD LISA in 12  months with labs prior    Needed in feb 2023

## 2022-08-03 ENCOUNTER — CARE COORDINATION (OUTPATIENT)
Dept: CARDIOLOGY | Facility: CLINIC | Age: 67
End: 2022-08-03

## 2022-08-03 DIAGNOSIS — I50.22 CHRONIC SYSTOLIC CONGESTIVE HEART FAILURE (H): ICD-10-CM

## 2022-08-03 RX ORDER — HYDROCHLOROTHIAZIDE 50 MG/1
TABLET ORAL
Qty: 27 TABLET | Refills: 3 | Status: SHIPPED | OUTPATIENT
Start: 2022-08-03 | End: 2022-08-12

## 2022-08-03 RX ORDER — LIDOCAINE 40 MG/G
CREAM TOPICAL
Status: CANCELLED | OUTPATIENT
Start: 2022-08-03

## 2022-08-03 NOTE — PROGRESS NOTES
D:  Pt called to report weight gain.  Current LVAD numbers, Speed: 5500, Flow: 4.6, PI: 3.1, Power: 4.7, & weight 216, from 211.  Pt reporting increased swelling in his abdomen, worsened shortness of breath,  increase in fatigue and no recent changes to diet.  Current Bumex 4mg BID.  Labs drawn today: creatinine 2.1 and potassium 3.7.  I:  Discussed situation with Dr. Cisenros.  Plan: START Diuril 50 mg once a week on Wednesdays.  BMP weekly while taking Diuril.  Add RHC to September 29th appt's.  Pt advised and agreeable.  A:  Weight gain  P:  Pt verbalized understanding of the instructions given.  Will call VAD coordinator with further needs and questions.

## 2022-08-04 DIAGNOSIS — I50.22 CHRONIC SYSTOLIC CONGESTIVE HEART FAILURE (H): Primary | ICD-10-CM

## 2022-08-04 DIAGNOSIS — Z95.811 LVAD (LEFT VENTRICULAR ASSIST DEVICE) PRESENT (H): ICD-10-CM

## 2022-08-04 LAB — INR HOME MONITORING: 2.1 (ref 1.8–2.3)

## 2022-08-04 RX ORDER — LIDOCAINE 40 MG/G
CREAM TOPICAL
Status: CANCELLED | OUTPATIENT
Start: 2022-08-04

## 2022-08-05 ENCOUNTER — ANTICOAGULATION THERAPY VISIT (OUTPATIENT)
Dept: ANTICOAGULATION | Facility: CLINIC | Age: 67
End: 2022-08-05

## 2022-08-05 DIAGNOSIS — Z95.811 LVAD (LEFT VENTRICULAR ASSIST DEVICE) PRESENT (H): Primary | ICD-10-CM

## 2022-08-05 DIAGNOSIS — I50.22 CHRONIC SYSTOLIC CONGESTIVE HEART FAILURE (H): ICD-10-CM

## 2022-08-05 DIAGNOSIS — I48.0 PAROXYSMAL ATRIAL FIBRILLATION (H): ICD-10-CM

## 2022-08-05 NOTE — PROGRESS NOTES
ANTICOAGULATION MANAGEMENT     Eliseo Tanner 67 year old male is on warfarin with therapeutic INR result. (Goal INR 1.7-2.3)    Recent labs: (last 7 days)     08/04/22  0000   INR 2.1       ASSESSMENT       Source(s): Chart Review    Previous INR was Therapeutic last visit; previously outside of goal range    Medication, diet, health changes since last INR chart reviewed; none identified           PLAN     Recommended plan for no diet, medication or health factor changes affecting INR     Dosing Instructions: Continue your current warfarin dose with next INR in 2 weeks       Summary  As of 8/5/2022    Full warfarin instructions:  4 mg every Sun, Wed; 2 mg all other days   Next INR check:  8/18/2022             Detailed voice message left for Eliseo with dosing instructions and follow up date.     Patient to recheck with home meter    Education provided: Please call back if any changes to your diet, medications or how you've been taking warfarin and Contact [phone] with any changes, questions or concerns.     Plan made per [facility] anticoagulation protocol and per LVAD protocol    Luiza Perkins RN  Anticoagulation Clinic  8/5/2022    _______________________________________________________________________     Anticoagulation Episode Summary     Current INR goal:  1.7-2.3   TTR:  42.8 % (1 y)   Target end date:  Indefinite   Send INR reminders to:  ANTICOAG LVAD    Indications    LVAD (left ventricular assist device) present (H) [Z95.811]  Chronic systolic congestive heart failure (H) [I50.22]  Paroxysmal atrial fibrillation (H) [I48.0]           Comments:  7/12/22 GOAL RANGE CHANGE 1.7-2.3   HM 3 placed 2/18/2020, 81mg ASA,  5/6/22 [employer]         Anticoagulation Care Providers     Provider Role Specialty Phone number    Nader Cisneros MD Referring Cardiovascular Disease [phone]

## 2022-08-11 ENCOUNTER — CARE COORDINATION (OUTPATIENT)
Dept: CARDIOLOGY | Facility: CLINIC | Age: 67
End: 2022-08-11

## 2022-08-11 DIAGNOSIS — I50.22 CHRONIC SYSTOLIC CONGESTIVE HEART FAILURE (H): ICD-10-CM

## 2022-08-12 RX ORDER — HYDROCHLOROTHIAZIDE 50 MG/1
TABLET ORAL
Qty: 50 TABLET | Refills: 3 | Status: SHIPPED | OUTPATIENT
Start: 2022-08-12 | End: 2022-01-01

## 2022-08-12 NOTE — PROGRESS NOTES
D:  rcvd call from pt to report that weight had returned to 217lbs by Tuesday.  Follow up labs rcvd Thursday.  Creatinine 2.0 and Potassium 3.5.  Pt reports return of shortness of breath, abdominal fullness and fatigue. VAD parameters stable. Weight as of Friday, 8/12 - 215lbs.  Pt feels better when weights closer 210-211.  I:  Discussed situation with Dr. Cisneros.  Plan: implant cardiomems, increase Diuril to 50 mg on Wednesday and Saturday's, take an additional 60 mEq Potassium on Diuril days.  Instructed pt that on the days he takes Diuril he will need to take his potassium in 3 doses to assist with absorption. Dr. Cisneros called and discussed cardiomems with pt and wife and they are agreeable to the plan.  Pt to have BMP repeated Wednesday, August 17th.  Cardiomems implant arranged for 9/29.  Encouraged pt to limit fluid intake and restrict sodium to assist in diuresis. Pt to call if weights go higher after Diuril tomorrow.  A: Follow up  P:  Pt/wife verbalized understanding of the instructions given.  Will call VAD coordinator with further needs and questions.

## 2022-08-18 LAB — INR HOME MONITORING: 2 (ref 1.8–2.3)

## 2022-08-19 ENCOUNTER — ANTICOAGULATION THERAPY VISIT (OUTPATIENT)
Dept: ANTICOAGULATION | Facility: CLINIC | Age: 67
End: 2022-08-19

## 2022-08-19 DIAGNOSIS — Z95.811 LVAD (LEFT VENTRICULAR ASSIST DEVICE) PRESENT (H): Primary | ICD-10-CM

## 2022-08-19 DIAGNOSIS — I48.0 PAROXYSMAL ATRIAL FIBRILLATION (H): ICD-10-CM

## 2022-08-19 DIAGNOSIS — I50.22 CHRONIC SYSTOLIC CONGESTIVE HEART FAILURE (H): ICD-10-CM

## 2022-08-19 NOTE — PROGRESS NOTES
ANTICOAGULATION MANAGEMENT     Eliseo Tanner 67 year old male is on warfarin with therapeutic INR result. (Goal INR 1.7-2.3)    Recent labs: (last 7 days)     08/18/22  0000   INR 2.0       ASSESSMENT       Source(s): Patient/Caregiver Call       Warfarin doses taken: Warfarin taken as instructed    Diet: No new diet changes identified    New illness, injury, or hospitalization: No    Medication/supplement changes: None noted    Signs or symptoms of bleeding or clotting: No    Previous INR: Therapeutic last 2(+) visits    Additional findings: None       PLAN     Recommended plan for no diet, medication or health factor changes affecting INR     Dosing Instructions: Continue your current warfarin dose with next INR in 2 weeks       Summary  As of 8/19/2022    Full warfarin instructions:  4 mg every Sun, Wed; 2 mg all other days   Next INR check:  9/1/2022             Telephone call with Eliseo who verbalizes understanding and agrees to plan and who agrees to plan and repeated back plan correctly    Patient using outside facility for labs    Education provided: None required    Plan made per [facility] anticoagulation protocol and per LVAD protocol    Luiza Perkins RN  Anticoagulation Clinic  8/19/2022    _______________________________________________________________________     Anticoagulation Episode Summary     Current INR goal:  1.7-2.3   TTR:  42.8 % (1 y)   Target end date:  Indefinite   Send INR reminders to:  ANTICOAG LVAD    Indications    LVAD (left ventricular assist device) present (H) [Z95.811]  Chronic systolic congestive heart failure (H) [I50.22]  Paroxysmal atrial fibrillation (H) [I48.0]           Comments:  7/12/22 GOAL RANGE CHANGE 1.7-2.3   HM 3 placed 2/18/2020, 81mg ASA,  5/6/22 [employer]         Anticoagulation Care Providers     Provider Role Specialty Phone number    Nader Cisneros MD Referring Cardiovascular Disease [phone]

## 2022-08-25 ENCOUNTER — CARE COORDINATION (OUTPATIENT)
Dept: CARDIOLOGY | Facility: CLINIC | Age: 67
End: 2022-08-25

## 2022-08-26 NOTE — PROGRESS NOTES
D: Rcvd follow up labs.  Creatinine is elevated at 2.1. Called pt to check in.  Weight Thursday 216, today 215. Pt took last Diuril Wednesday.  Pt reporting minimal relief today.  Pt c/o abdominal fullness, shortness of breath. Stable VAD parameters.  I:  Discussed situation with Dr. Cisneros.  Plan: MD recommending that patient be admitted for IV diuresis.  Pt politely declined this option.  Pt to continue with current plan of care and weekly labs.  Pt to call if symptoms worsen and he will need to report to the ER for evaluation.  A:  Follow up  P:  Pt verbalized understanding of the instructions given.  Will call VAD coordinator with further needs and questions.

## 2022-09-01 NOTE — PROGRESS NOTES
"D: Called pt to discuss current status.  Pt reporting ongoing abdominal fullness, shortness of breath, JODEE and elevated weights.  Yesterday's weight was 218 and today 216.  Pt to scheduled Diuril yesterday.  LVAD parameters stable, no alarms.  Pt emotional and frustrated with current situation.  I:  Explained that stats today are not improved from last week when it was recommended that he come to the [facility] ER for full evaluation and possible admission.  Recommended again today, that pt come to the ER today.  Pt expressed concern with coming today and tomorrow and stated, \"maybe I can come next week\".  Pt became tearful and stated that he is 'fed up with all this\".  Offered emotional support and supportive listening.  Pt agree's to call if symptoms worsen.  Primary cardiologist updated.  A:  Follow up  P:  Pt verbalized understanding of the instructions given.  Will call VAD coordinator with further needs and questions.    "

## 2022-09-02 NOTE — PROGRESS NOTES
ANTICOAGULATION MANAGEMENT     Eliseo Tanner 67 year old male is on warfarin with therapeutic INR result. (Goal INR 1.7-2.3)    Recent labs: (last 7 days)     09/01/22  0000   INR 2.1       ASSESSMENT       Source(s): Patient/Caregiver Call       Warfarin doses taken: Warfarin taken as instructed    Diet: No new diet changes identified    New illness, injury, or hospitalization: Yes: Patient is currently at the [facility] ER waiting to be assess for SOB and distended abdomen     Medication/supplement changes: None noted    Signs or symptoms of bleeding or clotting: No    Previous INR: Therapeutic last 2(+) visits    Additional findings: Let patient and spouse Veronique know that the Coumadin clinic will follow-up when patient gets discharged.       PLAN     Recommended plan for no diet, medication or health factor changes affecting INR     Dosing Instructions: Continue your current warfarin dose with next INR in 1 week       Summary  As of 9/2/2022    Full warfarin instructions:  4 mg every Sun, Wed; 2 mg all other days   Next INR check:  9/9/2022             Telephone call with  spouse Veronique who verbalizes understanding and agrees to plan and who agrees to plan and repeated back plan correctly    Patient to recheck with home meter    Education provided: None required    Plan made per [facility] anticoagulation protocol and per LVAD protocol    Luiza Perkins RN  Anticoagulation Clinic  9/2/2022    _______________________________________________________________________     Anticoagulation Episode Summary     Current INR goal:  1.7-2.3   TTR:  42.8 % (1 y)   Target end date:  Indefinite   Send INR reminders to:  ANTICOAG LVAD    Indications    LVAD (left ventricular assist device) present (H) [Z95.811]  Chronic systolic congestive heart failure (H) [I50.22]  Paroxysmal atrial fibrillation (H) [I48.0]           Comments:  7/12/22 GOAL RANGE CHANGE 1.7-2.3   HM 3 placed 2/18/2020, 81mg ASA,  5/6/22 [employer] Home Meter          Anticoagulation Care Providers     Provider Role Specialty Phone number    Nader Cisneros MD Referring Cardiovascular Disease [phone]

## 2022-09-02 NOTE — H&P
[facility]    Cardiology History and Physical - Cardiology    Date of Admission:  9/2/2022    Assessment & Plan: S    Eliseo Tanner is a 67 year old male admitted on 9/2/2022. He has a history of chronic systolic HF 2/2 NICM s/p LVAD 2/2020, CAD, CKD III, afib, DM II who is admitted with weight gain and increasing peripheral edema c/w CHF exacerbation.     # Decompensated systolic heart failure secondary to NICM s/p HM3 LVAD as DT on 2/18/2020  Stage D. NYHA Class III.  P/w signs and sx hypervolemia, weight 218 lbs (dry weight 210-211 lbs), ongoing edema, SOB with bibasilar crackles however no hypoxia. NT-BNP 6484 on admit, last 4416 in 2/2022. Started bumex gtt @ 0.5 mg/hr in ED after 4 mg IV loading dose, thus far meeting goals with 600 mL out since. Planned outpt cardiomems per Dr. Cisneros, will make NPO MN and hold warfarin for potential RHC.      Fluid status: hypervolemic  Diuresis: continue bumex gtt, daily goal net negative 2L; hold PTA PO bumex and diuril   ACEi/ARB: contraindicated due to renal dysfunction (none PTA)  BB: None due to history of shock.  Aldosterone antagonist contraindicated due to renal dysfunction (none PTA)  SCD prophylaxis: ICD  NSAID use: none  Sleep Apnea Evaluation: has ABHINAV, uses CPAP  BP: amlodipine 10 mg daily, hydralazine 100 g 3 times daily, long-acting isosorbide 90 mg daily - cont as BPs allow   LDH trends: 290-310 for past year, last 253 5/2022, recheck pending   Anticoagulation: warfarin INR goal 1.7-2.3, 1.95 on admission - holding in case of cath   Antiplatelet: ASA 81 mg daily - continue   RV support: none  Lytes: q8h K and Mg checks while on bumex gtt, high replacement protocol, and continue scheduled 60 mEq K BID, scheduled Mag 400 mg daily    VAD - HM3  Interrogate in AM  Flow 4.6 LPM  PI 3.1  Speed 5500 RPM  Power 4.3 muhammad     # Hyponatremia, mild  Na 133 on admission, fluctuating 133-136 for past 1 month.   -  trend BMP while diuresing as above     # Chronic MSSA drive line infection  Baseline leakage from LVAD site, no fevers, does not meet SIRS criteria.   - cont chronic suppressive therapy with cefadroxil 500 mg BID    # CKD, stage III  Cr 2, recent baseline 1.8-2.1 per chart review. Has been steadily trending up over past few months. Follows with nephrology.   - trend while diuresing   - avoid nephrotoxins   - cont pta nephrocaps     # Hx of Afib w/ RVR and aberrancy vs FDC vs AT vs slow VT  S/p DCCV on 2/28/21 back into sinus rhythm. Sinus bradycardia with increased RV pacing in 2/2022 so increased lower pacer rate from 40 to 60 and turned on rate response.   - continue amiodarone 200 mg daily       Chronic:  # CAD  Mild to moderate CAD with severe branch disease per [facility] 11/2019.   - cont ASA, statin     # Hypothyroidism   Last TSH 10.5 on 8/3/22. On amiodarone as above   - cont levothyroxine 50 mcg daily   - recheck TSH    # DM II  A1c 6 on 8/25/22.  Previously on 10 units glargine, appears this may have been recently discontinued and dapagliflozin started by PCP.   - hold dapagliflozin   - LDSSI    # Insomnia   Reports he cannot sleep in hospital without pta ambien. States he has tried trazodone and melatonin without any success.   - cont pta ambien, low threshold to hold if any concerns for delirium     # BPH, urinary retention - cont pta finasteride, tamsulosin   # GERD - cont PTA PPI  # Gout - cont pta allopurinol   # IgG Kappa monoclonal gammopathy of undetermined significance - Followed by heme/onc outpt       Diet: Cardiac, sodium-restricted  DVT Prophylaxis: Warfarin as above, holding for now   Guevara Catheter: Not present  Code Status: FULL  Fluids: none  Lines: PIVs, LVAD       Disposition Plan   Expected discharge: 4 - 7 days, recommended to prior living arrangement once fluid volume status optimized on oral medication and advanced heart failure work-up completed.    Entered: Angelia Teran MD 09/02/2022,  7:01 PM     The patient's care was discussed with the Bedside Nurse, Patient and cardiology fellow.    To be staffed in AM.     Angelia Teran MD   IM PGY-3  Cards 2 Nights   [facility]    ______________________________________________________________________    Chief Complaint   weight gain, fluid retention, SOB    History is obtained from the patient    History of Present Illness   Eliseo Tanner is a 67 year old male with a past medical history significant for systolic heart failure 2/2 NICM s/p LVAD (2/18/2020) c/b recurrent drive-line infections, T2DM, ABHINAV on CPAP, HTN, CKD stage III who is admitted for heart failure exacerbation.     Reporting significant weight gain (dry weight 211 lbs, now 218 lbs) - seems to have been going on for about 1 month. Also reports fluid retention in abdomen and BLE, SOB. He tells me his SOB has been stable for the past several months. He typically sleeps on his side and has no issues with waking up at night due to breathing issues, however does use CPAP. He denies any wheezing, does not use inhalers at home. States he had been taking diuril per Dr. Cisneros's recommendations on Weds and Sat for the past few weeks, would experience transient weight loss but over the past week he has had no real response to the diuril. Pt describes chronic drainage from LVAD site x1 year without recent changes in fluid volume or color. He has had no fevers or chills at home. No dizziness and is able to walk around without assistance. He does describe some constipation recently for which he has taken a pill at home that does not seem to help much.     Per chart review, appears patient has noticed increasing abdominal distension and weight gain since end of July 2022, at which time he was trialed on hydrochlorothiazide 50 mg daily x2 days, bumex increased to 4 mg BID.   On 8/3, noted weight to be up to 216 from 211 lbs, and was started on 50 mg diuril qweekly  on wednesdays.   No appreciable weight change on f/u, diuril further increased to 50 mg BID weds and sat, and plan made for cardiomems.   8/25 weight 215 lbs and pt reported continued symptoms without appreciable change, Dr. Cisneros recommended admission for IV diuresis and pt declined.   9/1 - again no weight change, again recommended present to ED, which he ultimately did this evening.      In ED, pt was hemodynamically stable, sats mid 90s on RA, /87. Given 4 mg IV bumex then started on bumex gtt @ 0.5 mg/hr @ 1700. Pt requesting to use urinal, would like to try this before lombardo.   Labs with Cr 2, K 3.7, Na 133.  EKG obtained, no imaging.     Review of Systems    The 10 point Review of Systems is negative other than noted in the HPI or here.     Past Medical History    I have reviewed this patient's medical history and updated it with pertinent information if needed.   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)      LVAD (left ventricular assist device) present (H)    - Hx of Afib w/ RVR and aberrancy vs. half-way vs. AT vs. Slow VT. S/p DCCV on 2/28/21 back into sinus rhythm  - IgG kappa MGUS   - DM II  - HTN  - CAD  - CKD stage III    Past Surgical History   I have reviewed this patient's surgical history and updated it with pertinent information if needed.  Past Surgical History:   Procedure Laterality Date     ANESTHESIA CARDIOVERSION N/A 2/28/2020    Procedure: ANESTHESIA, FOR CARDIOVERSION;  Surgeon: GENERIC ANESTHESIA PROVIDER;  Location: [facility] OR     CV CENTRAL VENOUS CATHETER PLACEMENT N/A 2/13/2020    Procedure: Central Venous Catheter Placement;  Surgeon: Chente Moss MD;  Location: [facility] HEART CARDIAC CATH LAB     CV INTRA AORTIC BALLOON N/A 2/7/2020    Procedure: Intra-Aortic Balloon Pump  Insertion;  Surgeon: Jose Baldwin MD;  Location:  HEART CARDIAC CATH LAB     CV INTRA AORTIC BALLOON N/A 2020    Procedure: Intra-Aortic Balloon Pump Insertion;  Surgeon: Chente Moss MD;  Location:  HEART CARDIAC CATH LAB     CV RIGHT HEART CATH MEASUREMENTS RECORDED N/A 2020    Procedure: CV RIGHT HEART CATH;  Surgeon: Dalton Baeza MD;  Location:  HEART CARDIAC CATH LAB     CV RIGHT HEART CATH MEASUREMENTS RECORDED N/A 2021    Procedure: Right Heart Cath;  Surgeon: Chun Ball MD;  Location:  HEART CARDIAC CATH LAB     CV RIGHT HEART CATH MEASUREMENTS RECORDED N/A 2/10/2022    Procedure: CV RIGHT HEART CATH;  Surgeon: Dalton Baeza MD;  Location:  HEART CARDIAC CATH LAB     CV SWAN LUCIANA PROCEDURE N/A 2020    Procedure: Pierson Luciana Procedure;  Surgeon: Chente Moss MD;  Location:  HEART CARDIAC CATH LAB     EP ICD Bilateral 3/16/2020    Procedure: EP ICD;  Surgeon: Dali Day MD;  Location:  HEART CARDIAC CATH LAB     INSERT VENTRICULAR ASSIST DEVICE LEFT (HEARTMATE II) N/A 2020    Procedure: INSERTION, LEFT VENTRICULAR ASSIST DEVICE (HEARTMATE III);  Surgeon: Mac Jaramillo MD;  Location:  OR     IR CVC TUNNEL PLACEMENT > 5 YRS OF AGE  2021     IR CVC TUNNEL REMOVAL RIGHT  3/16/2021     THORACOSCOPY Right 3/6/2020    Procedure: RIGHT VIDEO-ASSISTED THORASCOPIC SURGERY, EVACUATION OF HEMOTHORAX, PLACEMENT OF CHEST TUBES;  Surgeon: William Gan MD;  Location:  OR       Social History   I have reviewed this patient's social history and updated it with pertinent information if needed.  Social History     Tobacco Use     Smoking status: Former Smoker     Quit date: 1994     Years since quittin.4     Smokeless tobacco: Never Used   Substance Use Topics     Alcohol use: Not Currently     Drug use: Never     Family History   I have reviewed this patient's family history and updated it with  pertinent information if needed.       Prior to Admission Medications   Prior to Admission Medications   Prescriptions Last Dose Informant Patient Reported? Taking?   B Complex-C-Folic Acid (RENAL) 1 MG CAPS   No No   Sig: Take 1 capsule by mouth daily   FLUoxetine (PROZAC) 10 MG capsule  Self Yes No   Sig: Take 30 mg by mouth daily   allopurinol (ZYLOPRIM) 100 MG tablet  Self No No   Si tablets (200 mg) by Oral or Feeding Tube route daily   amLODIPine (NORVASC) 5 MG tablet   No No   Sig: Take 2 tablets (10 mg) by mouth daily   amiodarone (PACERONE) 200 MG tablet   No No   Sig: TAKE 1 TABLET BY MOUTH ONCE DAILY   aspirin (ASA) 81 MG EC tablet  Self No No   Sig: Take 1 tablet (81 mg) by mouth daily   atorvastatin (LIPITOR) 20 MG tablet   No No   Sig: Take 1 tablet (20 mg) by mouth daily   bumetanide (BUMEX) 1 MG tablet   No No   Sig: Take 4 tablets (4 mg) by mouth 2 times daily   cefadroxil (DURICEF) 500 MG capsule   No No   Sig: Take 1 capsule (500 mg) by mouth 2 times daily   co-enzyme Q-10 200 MG CAPS  Self Yes No   Si mg by Oral or Feeding Tube route daily   finasteride (PROSCAR) 5 MG tablet  Self No No   Sig: Take 1 tablet (5 mg) by mouth daily Helps with urinary retention.   hydrALAZINE (APRESOLINE) 100 MG tablet   No No   Sig: Take 1 tablet (100 mg) by mouth 3 times daily   hydrochlorothiazide (HYDRODIURIL) 50 MG tablet   No No   Sig: Take 50mg (1 tab) every Wednesday & Saturday's   insulin glargine (BASAGLAR KWIKPEN) 100 UNIT/ML pen  Self Yes No   Sig: Inject 10 Units Subcutaneous At Bedtime    insulin pen needle (32G X 4 MM) 32G X 4 MM miscellaneous  Self Yes No   isosorbide mononitrate (IMDUR) 30 MG 24 hr tablet   No No   Sig: Take 3 tablets (90 mg) by mouth daily   magnesium oxide (MAG-OX) 400 MG tablet  Self No No   Si tablet (400 mg) by Oral or Feeding Tube route daily   nitroGLYcerin (NITROSTAT) 0.4 MG sublingual tablet  Self No No   Sig: For chest pain place 1 tablet under the tongue  every 5 minutes for 3 doses. If symptoms persist 5 minutes after 1st dose call 911.   omeprazole (PRILOSEC) 20 MG DR capsule  Self Yes No   Sig: Take 20 mg by mouth daily   potassium chloride ER (KLOR-CON M) 20 MEQ CR tablet   No No   Sig: Take 3 tablets (60 mEq) by mouth 2 times daily   senna-docusate (SENOKOT-S/PERICOLACE) 8.6-50 MG tablet  Self Yes No   Sig: Take 1 tablet by mouth 2 times daily as needed for constipation    tamsulosin (FLOMAX) 0.4 MG capsule   Yes No   Sig: Take 2 capsules (0.8 mg) by mouth daily This med helps with urinary retention   warfarin ANTICOAGULANT (COUMADIN) 2 MG tablet  Self Yes No   Sig: Take 4 mg by mouth daily    warfarin ANTICOAGULANT (COUMADIN) 4 MG tablet   No No   Sig: TAKE 1 TO 2 TABLETS (4 MG TO 8 MG) BY MOUTH ONCE DAILY AS DIRECTED   zolpidem (AMBIEN) 5 MG tablet  Self Yes No   Sig: Take 5 mg by mouth nightly as needed for Insomnia      Facility-Administered Medications: None     Allergies   Allergies   Allergen Reactions     Heparin      HIT screen positive 2/14/20, reflex DAVINA negative; however heme recommended treating as if positive  HIT screen negative 2/11/20     Oxycodone Itching and Other (See Comments)     Chlorhexidine Rash       Physical Exam   Vital Signs: Temp: 97.5  F (36.4  C) Temp src: Oral BP: 106/85 Pulse: 62   Resp: 18 SpO2: 94 % O2 Device: None (Room air)    Weight: 218 lbs 0 oz    General Appearance: awake, alert, comfortable-appearing  Eyes: sclerae anicteric, conjunctivae non-injected, pupils equal and reactive, EOMI  HEENT: AT/NC, mm moist  Respiratory: bibasilar crackles present, slightly labored breathing on RA without appreciable wheeze   Cardiovascular:  LVAD present, non-palpable peripheral pulses   GI: abdomen distended, non-tender, LVAD site without clean and intact dressing   Skin: warm and dry, no rashes on exposed skin   Musculoskeletal: 2-3+ BLE   Neurologic: AOx4, moving all ext    Data   Data reviewed today: I reviewed all medications, new  labs and imaging results over the last 24 hours.    Recent Labs   Lab 09/02/22  1656 09/01/22  0000   WBC 8.4  --    HGB 13.4  --    MCV 91  --      --    INR  --  2.1   *  --    POTASSIUM 3.7  --    CHLORIDE 95*  --    CO2 26  --    BUN 48.8*  --    CR 2.00*  --    ANIONGAP 12  --    CALOS 8.4*  --    *  --

## 2022-09-02 NOTE — ED PROVIDER NOTES
[place] EMERGENCY DEPARTMENT ([facility])  9/02/22  History     Chief Complaint   Patient presents with     fluid retention     Leg Swelling     The history is provided by the patient and medical records.     Eliseo Tanner is a 67 year old male with a past medical history significant for systolic heart failure s/p LVAD (2/18/2020), T2DM, ABHINAV on CPAP, HTN, CKD stage III, GERD who presents to the Emergency Department for evaluation of fluid retention. Patient reports significant weight gain and fluid retention in his abdomen and lower extremities.  He also endorses some shortness of breath.  Patient reports adjusting medications for the past few months with his cardiologist, Dr. Cisneros. He had improvement with hydrochlorothiazide, however, he is not seeing any difference anymore.  Patient is on Bumex and anticoagulated on warfarin.    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)      LVAD (left ventricular assist device) present (H)      Past Surgical History:   Procedure Laterality Date     ANESTHESIA CARDIOVERSION N/A 2/28/2020    Procedure: ANESTHESIA, FOR CARDIOVERSION;  Surgeon: GENERIC ANESTHESIA PROVIDER;  Location: [facility] OR     CV CENTRAL VENOUS CATHETER PLACEMENT N/A 2/13/2020    Procedure: Central Venous Catheter Placement;  Surgeon: Chente Moss MD;  Location:  HEART CARDIAC CATH LAB     CV INTRA AORTIC BALLOON N/A 2/7/2020    Procedure: Intra-Aortic Balloon Pump Insertion;  Surgeon: Jose Baldwin MD;  Location:  HEART CARDIAC CATH LAB     CV INTRA AORTIC BALLOON N/A 2/13/2020    Procedure: Intra-Aortic Balloon Pump Insertion;  Surgeon: Chente Moss MD;  Location:  HEART CARDIAC CATH LAB     CV RIGHT HEART CATH MEASUREMENTS RECORDED  N/A 9/21/2020    Procedure: CV RIGHT HEART CATH;  Surgeon: Dalton Baeza MD;  Location:  HEART CARDIAC CATH LAB     CV RIGHT HEART CATH MEASUREMENTS RECORDED N/A 1/7/2021    Procedure: Right Heart Cath;  Surgeon: Chun Ball MD;  Location:  HEART CARDIAC CATH LAB     CV RIGHT HEART CATH MEASUREMENTS RECORDED N/A 2/10/2022    Procedure: CV RIGHT HEART CATH;  Surgeon: Dalton Baeza MD;  Location:  HEART CARDIAC CATH LAB     CV SWAN LUCIANA PROCEDURE N/A 2/13/2020    Procedure: Redkey Luciana Procedure;  Surgeon: Chente Moss MD;  Location:  HEART CARDIAC CATH LAB     EP ICD Bilateral 3/16/2020    Procedure: EP ICD;  Surgeon: Dali Day MD;  Location:  HEART CARDIAC CATH LAB     INSERT VENTRICULAR ASSIST DEVICE LEFT (HEARTMATE II) N/A 2/18/2020    Procedure: INSERTION, LEFT VENTRICULAR ASSIST DEVICE (HEARTMATE III);  Surgeon: Mac Jaramillo MD;  Location: [facility] OR     IR CVC TUNNEL PLACEMENT > 5 YRS OF AGE  2/23/2021     IR CVC TUNNEL REMOVAL RIGHT  3/16/2021     THORACOSCOPY Right 3/6/2020    Procedure: RIGHT VIDEO-ASSISTED THORASCOPIC SURGERY, EVACUATION OF HEMOTHORAX, PLACEMENT OF CHEST TUBES;  Surgeon: William Gan MD;  Location:  OR     allopurinol (ZYLOPRIM) 100 MG tablet  amiodarone (PACERONE) 200 MG tablet  amLODIPine (NORVASC) 5 MG tablet  aspirin (ASA) 81 MG EC tablet  atorvastatin (LIPITOR) 20 MG tablet  B Complex-C-Folic Acid (RENAL) 1 MG CAPS  bumetanide (BUMEX) 1 MG tablet  cefadroxil (DURICEF) 500 MG capsule  co-enzyme Q-10 200 MG CAPS  finasteride (PROSCAR) 5 MG tablet  FLUoxetine (PROZAC) 10 MG capsule  hydrALAZINE (APRESOLINE) 100 MG tablet  hydrochlorothiazide (HYDRODIURIL) 50 MG tablet  insulin glargine (BASAGLAR KWIKPEN) 100 UNIT/ML pen  insulin pen needle (32G X 4 MM) 32G X 4 MM miscellaneous  isosorbide mononitrate (IMDUR) 30 MG 24 hr tablet  magnesium oxide (MAG-OX) 400 MG tablet  nitroGLYcerin (NITROSTAT) 0.4 MG sublingual  "tablet  omeprazole (PRILOSEC) 20 MG DR capsule  potassium chloride ER (KLOR-CON M) 20 MEQ CR tablet  senna-docusate (SENOKOT-S/PERICOLACE) 8.6-50 MG tablet  tamsulosin (FLOMAX) 0.4 MG capsule  warfarin ANTICOAGULANT (COUMADIN) 2 MG tablet  warfarin ANTICOAGULANT (COUMADIN) 4 MG tablet  zolpidem (AMBIEN) 5 MG tablet      Allergies   Allergen Reactions     Heparin      HIT screen positive 20, reflex DAVINA negative; however heme recommended treating as if positive  HIT screen negative 20     Oxycodone Itching and Other (See Comments)     Chlorhexidine Rash     Family History  History reviewed. No pertinent family history.  Social History   Social History     Tobacco Use     Smoking status: Former Smoker     Quit date: 1994     Years since quittin.4     Smokeless tobacco: Never Used   Substance Use Topics     Alcohol use: Not Currently     Drug use: Never      Past medical history, past surgical history, medications, allergies, family history, and social history were reviewed with the patient. No additional pertinent items.     I have reviewed the Medications, Allergies, Past Medical and Surgical History, and Social History in the Epic system.    Review of Systems   Constitutional: Negative.    Respiratory: Positive for shortness of breath.    Cardiovascular: Positive for leg swelling.   Gastrointestinal: Positive for abdominal distention (fluid retention).   All other systems reviewed and are negative.    A complete review of systems was performed with pertinent positives and negatives noted in the HPI, and all other systems negative.    Physical Exam   BP: 105/89  Pulse: 62  Temp: 97.5  F (36.4  C)  Resp: 18  Height: 170.2 cm (5' 7\")  Weight: 98.9 kg (218 lb) (stated)  SpO2: 95 %      Physical Exam  Vitals and nursing note reviewed.   Constitutional:       General: He is not in acute distress.  HENT:      Mouth/Throat:      Mouth: Mucous membranes are moist.   Cardiovascular:      Comments: LVAD " sounds  Pulmonary:      Effort: Pulmonary effort is normal.      Breath sounds: Rales present.   Abdominal:      General: There is distension.      Palpations: Abdomen is soft.   Musculoskeletal:         General: Swelling present.      Right lower leg: Edema present.      Left lower leg: Edema present.   Skin:     General: Skin is warm.      Findings: Erythema present.      Comments: Mild erythema right lower extremity from stasis dermatitis   Neurological:      General: No focal deficit present.      Mental Status: He is alert and oriented to person, place, and time.   Psychiatric:         Mood and Affect: Mood normal.         Behavior: Behavior normal.         ED Course     At 4:33 PM the patient was seen and examined by Chris Saunders MD in Room ED17.        Procedures              EKG Interpretation:      Interpreted by Chris Saunders MD  Time reviewed: 5:02 PM   Symptoms at time of EKG: SOA   Rhythm: V paced  Rate: normal  Axis: normal  Ectopy: none  Conduction: normal  ST Segments/ T Waves: No ST-T wave changes  Q Waves: none  Comparison to prior: Unchanged from 2/22    Clinical Impression: Ventricular paced, no change          Results for orders placed or performed during the hospital encounter of 09/02/22 (from the past 24 hour(s))   EKG 12-lead, tracing only   Result Value Ref Range    Systolic Blood Pressure  mmHg    Diastolic Blood Pressure  mmHg    Ventricular Rate 60 BPM    Atrial Rate 40 BPM    WV Interval  ms    QRS Duration 170 ms     ms    QTc 518 ms    P Axis  degrees    R AXIS 270 degrees    T Axis 118 degrees    Interpretation ECG Ventricular-paced rhythm  Abnormal ECG      CBC with platelets differential    Narrative    The following orders were created for panel order CBC with platelets differential.  Procedure                               Abnormality         Status                     ---------                               -----------         ------                      CBC with platelets and d...[[id number]]                                                   Please view results for these tests on the individual orders.   [place] Draw    Narrative    The following orders were created for panel order [place] Draw.  Procedure                               Abnormality         Status                     ---------                               -----------         ------                     Extra Blue Top Tube[[id number]]                                                         Extra Red Top Tube[[id number]]                                                            Please view results for these tests on the individual orders.     Medications   bumetanide (BUMEX) 0.25 mg/mL infusion (has no administration in time range)   bumetanide (BUMEX) injection 4 mg (has no administration in time range)             Assessments & Plan (with Medical Decision Making)   67-year-old male with LVAD who has been retaining fluid both in his abdomen and lower extremities.  His heart failure Dr. Spears has been trying to manage this at home by changes medications without success.  Heart failure service is expecting him.  Here his vital signs look good I will start him on a Bumex drip and he will be admitted to the cardiology to service under Dr. Salmeron.  At this point his labs are pending but are unlikely to change anything about his disposition.    I have reviewed the nursing notes.    I have reviewed the findings, diagnosis, plan and need for follow up with the patient.    New Prescriptions    No medications on file       Final diagnoses:   LVAD (left ventricular assist device) present (H)   Hypervolemia, unspecified hypervolemia type       I, Ravin Regan am serving as a trained medical scribe to document services personally performed by Chris Saunders MD, based on the provider's statements to me.      I, Chris Saunders MD, was physically present and have reviewed and verified the  accuracy of this note documented by Ravin Regan.     Chris Saunders MD  9/2/2022   [facility] EMERGENCY DEPARTMENT     Chris Saunders MD  09/02/22 7259

## 2022-09-02 NOTE — ED TRIAGE NOTES
Pt was brought by spouse with c/o fluid retention in the abdomin and lower extremities and sob. Pt has an LVAD. Pt takes Bumex 4mg two times a day.     Triage Assessment     Row Name 09/02/22 1640       Triage Assessment (Adult)    Airway WDL WDL       Respiratory WDL    Respiratory WDL WDL       Skin Circulation/Temperature WDL    Skin Circulation/Temperature WDL WDL       Cardiac WDL    Cardiac WDL X;all  LVAD use       [place] Coma Scale    Best Eye Response 4-->(E4) spontaneous    Best Motor Response 6-->(M6) obeys commands    Best Verbal Response 5-->(V5) oriented    [place] Coma Scale Score 15

## 2022-09-02 NOTE — PROGRESS NOTES
D:  Pt called to report going to [facility].  Spoke w/ VAD Coordinator yesterday and was recommended to go in.  Pt w/ abd distension, SOB and increased weight.  I:  Notified ED and Cards 2 attending.  P:  VAD Coordinator available for questions or concerns.

## 2022-09-03 NOTE — PLAN OF CARE
Transfer  Transferred from: ED  Via: bed  Reason for transfer: Pt appropriate for 6C  Belongings: sent with pt  Chart: Sent with pt   Medications: Sent with pt  Infusion:  bumex @ 0.5 mg/hr  Skin assessment: Two person assessment completed with Nataly RENDON

## 2022-09-03 NOTE — PLAN OF CARE
Temp: 97.9  F (36.6  C) Temp src: Oral BP: 93/78 Pulse: 60   Resp: 18 SpO2: 94 % O2 Device: BiPAP/CPAP       D: hypervolemia, SOB, CHF exacerbation  History of LVAD (HM III), ICD, CAD, NICM, Afib, HTN, HLD, CKD, T2DM, GERD, ABHINAV    I/A: Pt is AOx4. Tele in place. VSS on RA. Left PIV in place, infusing bumex. NPO since midnight for possible RHC. Scheduled potassium and magnesium given early this AM, per provider request d/t urine output overnight. Up independently for bedside urinal. Slept well overnight.    Changed: NPO @ midnight for possible RHC today  Running: bumex @ 1 mg/hr    P: Continue to monitor and follow POC. Plan for possible RHC today. Notify CARDS 2 with changes.

## 2022-09-03 NOTE — PROGRESS NOTES
"   09/03/22 1000   Quick Adds   Type of Visit Initial Occupational Therapy Evaluation   Living Environment   People in Home spouse;grandchild(arnold)  (17 yr old granddaughter)   Current Living Arrangements house   Home Accessibility stairs to enter home;stairs within home   Number of Stairs, Main Entrance 4   Stair Railings, Main Entrance railings on both sides of stairs   Number of Stairs, Within Home, Primary other (see comments)  (downstairs/upstairs, however main level living)   Transportation Anticipated family or friend will provide   Living Environment Comments Pt lives in a house w/wife and granddtr, has tub/shower and raised toilet. Uses SPC occasionally, IND at baseline.   Self-Care   Usual Activity Tolerance moderate   Current Activity Tolerance fair   Regular Exercise No   Equipment Currently Used at Home raised toilet seat;cane, straight   Fall history within last six months no   Activity/Exercise/Self-Care Comment IND at baseline   Instrumental Activities of Daily Living (IADL)   Previous Responsibilities medication management;driving;finances;housekeeping   IADL Comments Pt and pt's wife share IADL responsibilities   General Information   Onset of Illness/Injury or Date of Surgery 09/02/22   Referring Physician Destiny Salmeron MD   Patient/Family Therapy Goal Statement (OT) \"To go home\"   Additional Occupational Profile Info/Pertinent History of Current Problem Eliseo Tanner is a 67 year old male with a past medical history significant for systolic heart failure 2/2 NICM s/p LVAD (2/18/2020) c/b recurrent drive-line infections, T2DM, ABHINAV on CPAP, HTN, CKD stage III who is admitted for heart failure exacerbation   Existing Precautions/Restrictions cardiac  (LVAD)   Left Upper Extremity (Weight-bearing Status) full weight-bearing (FWB)   Right Upper Extremity (Weight-bearing Status) full weight-bearing (FWB)   Left Lower Extremity (Weight-bearing Status) full weight-bearing (FWB)   Right Lower " Extremity (Weight-bearing Status) full weight-bearing (FWB)   Cognitive Status Examination   Orientation Status orientation to person, place and time   Affect/Mental Status (Cognitive) WNL   Visual Perception   Visual Impairment/Limitations WNL   Sensory   Sensory Quick Adds No deficits were identified   Pain Assessment   Patient Currently in Pain No   Range of Motion Comprehensive   General Range of Motion no range of motion deficits identified   Strength Comprehensive (MMT)   General Manual Muscle Testing (MMT) Assessment no strength deficits identified   Clinical Impression   Criteria for Skilled Therapeutic Interventions Met (OT) Yes, treatment indicated   OT Diagnosis decreased functional endurance, which may impact ADL/IADL performance   OT Problem List-Impairments impacting ADL problems related to;activity tolerance impaired;strength   Assessment of Occupational Performance 1-3 Performance Deficits   Identified Performance Deficits bathing, home mgmt, leisure   Planned Therapy Interventions (OT) ADL retraining;home program guidelines;progressive activity/exercise;risk factor education   Clinical Decision Making Complexity (OT) low complexity   Anticipated Equipment Needs Upon Discharge (OT)   (TBD)   Risk & Benefits of therapy have been explained evaluation/treatment results reviewed;care plan/treatment goals reviewed;risks/benefits reviewed;patient   Clinical Impression Comments Pt to benefit from skilled OT during IP stay to address above deficits to maximize IND in ADLs/IADLs   OT Discharge Planning   OT Discharge Recommendation (DC Rec) home with assist;home with outpatient cardiac rehab   OT Rationale for DC Rec Pt near baseline, anticipate safe d/c home with wife and granddaughter to assist prn. Recommend OP CR to progress functional endurance and overall cardiovascular health   OT Brief overview of current status Mod-I using SPC, amb ~350 ft   Total Evaluation Time (Minutes)   Total Evaluation Time  (Minutes) 7   OT Goals   Therapy Frequency (OT) Daily   OT Predicted Duration/Target Date for Goal Attainment 09/07/22   OT Goals Hygiene/Grooming;Aerobic Activity;OT Goal 1   OT: Hygiene/Grooming modified independent;while standing   OT: Perform aerobic activity with stable cardiovascular response continuous activity;15 minutes;ambulation   OT: Goal 1 Pt will verbalize 3 EC/WS strategies to implement w/ADLs and IADLs for greater IND

## 2022-09-03 NOTE — PROGRESS NOTES
[facility]    Cardiology Progress Note- Cardiology        Date of Admission:  9/2/2022     Assessment & Plan: SL   Eliseo Tanner is a 67 year old male admitted on 9/2/2022. He has a history of chronic systolic HF 2/2 NICM s/p LVAD 2/2020, CAD, CKD 3B, afib, T2DM who is admitted with weight gain and increasing peripheral edema c/w CHF exacerbation.    Changes today:  - increased bumex gtt 1 mg/hr --> 2 mg/hr  - additional 1x dose bumex 3 mg IV  - increased K repletion from KCl 60 mEq BID --> TID  - increased LVAD speed 5500 -> 5600, notify MD if PI < 2.0  - no plan for inpatient RHC at this time, resumed PTA warfarin and notified pharmacy for dosing  - TSH 8.1, increased levothyroxine 50 --> 75 mcg     Decompensated systolic heart failure secondary to NICM s/p HM3 LVAD as DT on 2/18/2020  Stage D. NYHA Class III.  P/w signs and sx hypervolemia, weight 218 lbs (dry weight 210-211 lbs), ongoing edema, SOB with bibasilar crackles however no hypoxia. NT-BNP 6484 on admit, last 4416 in 2/2022. Started bumex gtt. Planned outpt cardiomems per Dr. Cisneros, RHC scheduled for 9/20.  >>>  Fluid status: hypervolemic  Diuresis: holding PTA PO bumex and diuril   - bumex loading dose 4 mg IV in ED   - bumex gtt started on admission @ 0.5 mg/hr --> 1 mg/hr --> 2 mg/hr on 9/3   - additional 1x dose bumex 3 mg IV 9/3.   - if net output < 3 L by 9/4 am, likely to start diuril  ACEi/ARB: contraindicated due to renal dysfunction (none PTA)  BB: None due to history of shock.  Aldosterone antagonist contraindicated due to renal dysfunction (none PTA)  SCD prophylaxis: ICD  NSAID use: none  Sleep Apnea Evaluation: has ABHINAV, uses CPAP at home - continue inpatient  BP: amlodipine 10 mg daily, hydralazine 100 g 3 times daily, long-acting isosorbide 90 mg daily - cont as BPs allow   LDH trends: 290-310 for past year, last 253 5/2022, recheck this admission 273; will monitor q48  hrs  Anticoagulation: warfarin INR goal 1.7-2.3  Antiplatelet: ASA 81 mg daily - continue   RV support: none  Lytes:   - q8h K and Mg checks while on bumex gtt   - high replacement protocol   - continue scheduled 60 mEq K BID --> TID on 9/3   - scheduled Mag 400 mg daily  Echo LVAD 9/3/22:    Left ventricular size is mildly dilated in size. LVEDd is 6.0 cm. The visual  ejection fraction is 10-15%. Severe diffuse hypokinesis is present. LVAD  cannula noted in the LV apex. Normal inflow velocities.    There is evidence of diastolic septal flattening suggestive of RV volume  overload. Outflow graft velocity normal.  The RV is not well visualized, there appears to be moderate RV dilation and  moderately reduced RV function.    Mild to moderate mitral insufficiency is present.    The aortic valve partially opens with each cardiac cycle with more significant  opening every third cardiac cycle. There is mild AI.    IVC diameter >2.1 cm collapsing <50% with sniff suggests a high RA pressure  estimated at 15 mmHg or greater.    No pericardial effusion is present.    This study was compared with the study from 2/10/2022. The LV is larger in  size with LVEDd of 6.0 cm from 5.5 cm, the IVC is dilated suggestive of  elevated RA filling pressures.    LVAD - HM3  Flow 4.6 LPM  PI 3.1  Speed 5500 RPM  Power 4.3 muhammad  - device interrogation needed  - increased speed to 5600 on 9/3; notify MD if PI < 2.0    Hyponatremia, mild  Na 133 on admission, fluctuating 133-136 for past 1 month.  - trend BMP while diuresing as above    Chronic MSSA drive line infection  Baseline leakage from LVAD site, no fevers, does not meet SIRS criteria.  - cont chronic suppressive therapy with cefadroxil 500 mg BID    CKD, stage 3B  Cr 2, recent baseline 1.8-2.1 per chart review. Has been steadily trending up over past few months. Follows with nephrology.  - trend while diuresing  - avoid nephrotoxins  - cont pta nephrocaps    Hx of Afib w/ RVR and  "aberrancy vs retirement vs AT vs slow VT  S/p DCCV on 2/28/21 back into sinus rhythm. Sinus bradycardia with increased RV pacing in 2/2022 so increased lower pacer rate from 40 to 60 and turned on rate response.  Most recent device interrogation (5/3/22) AF burden 0%.  - continue amiodarone 200 mg daily  - AC with warfarin as above  - cardiac device interrogation if still inpatient on Tuesday 9/6    CAD  Mild to moderate CAD with severe branch disease per [facility] 11/2019.  - cont ASA, statin    Hypothyroidism  Last TSH 10.5 on 8/3/22. On amiodarone as above  - recheck TSH 8.06 on 9/3  - PTA levothyroxine 50 mcg daily --> increased to 75 mcg 9/3  - will zaki recheck TSH in 4 weeks as outpatient (~10/5/22)    DM II  A1c 6 on 8/25/22.  Previously on 10 units glargine, appears this may have been recently discontinued and dapagliflozin started by PCP.  - hold dapagliflozin  - LDSSI    Insomnia   Reports he cannot sleep in hospital without pta ambien. States he has tried trazodone and melatonin without any success.  - cont pta ambien, low threshold to hold if any concerns for delirium    BPH, urinary retention  - cont pta finasteride, tamsulosin    GERD  - cont PTA PPI    Gout  - cont pta allopurinol    IgG Kappa monoclonal gammopathy of undetermined significance  - Followed by heme/onc outpt     Diet: Cardiac  DVT Prophylaxis: Warfarin  Guevara Catheter: Not present  Code Status: Full Code    The patient's care was discussed with the Attending Physician, Dr. Salmeron.    Heather Hall MD  PGY-1 Internal Medicine  Cards 2 Service  ______________________________________________________________________    Interval History   NAOE. Admitted to 6C. CPAP overnight. Feeling well this morning, less \"swollen\". No pain.    Data reviewed today: I reviewed all medications, new labs and imaging results over the last 24 hours.    Physical Exam   Vital Signs: Temp: 97.9  F (36.6  C) Temp src: Oral BP: 93/78 Pulse: 60   Resp: 18 SpO2: 94 % O2 Device: " BiPAP/CPAP    Weight: 219 lbs 3.2 oz     Temp:  [97.5  F (36.4  C)-97.9  F (36.6  C)] 97.9  F (36.6  C)  Pulse:  [60-62] 60  Resp:  [18-20] 18  BP: ()/() 93/78  SpO2:  [94 %-95 %] 94 %    I/O last 3 completed shifts:  In: 852.83 [P.O.:840; I.V.:12.83]  Out: 2225 [Urine:2225]    Gen: awake, alert, cooperative, no apparent distress, and appears stated age  Resp: No increased work of breathing on room air, no accessory muscle use  Cardio: appreciate LVAD, BLE 2+ pitting edema to knees, JVD to angle of jaw while sitting upright  GI: soft, non-tender, no masses palpated, driveline dressing c/d/i  Psych: cooperative, mood and affect congruent    Data   Recent Labs   Lab 09/03/22  0630 09/02/22  2318 09/02/22  2202 09/02/22  1658 09/02/22  1656 09/01/22  0000   WBC 7.1  --   --   --  8.4  --    HGB 12.4*  --   --   --  13.4  --    MCV 91  --   --   --  91  --      --   --   --  236  --    INR 2.16*  --   --  1.95*  --  2.1     --   --   --  133*  --    POTASSIUM 3.1*  3.1*  --  3.8  --  3.7  --    CHLORIDE 99  --   --   --  95*  --    CO2 24  --   --   --  26  --    BUN 45.1*  --   --   --  48.8*  --    CR 1.79*  --   --   --  2.00*  --    ANIONGAP 13  --   --   --  12  --    CALOS 7.8*  --   --   --  8.4*  --    * 137*  --   --  175*  --      Recent Results (from the past 24 hour(s))   XR Chest Port 1 View    Narrative    EXAM: XR CHEST PORT 1 VIEW  9/2/2022 9:44 PM      HISTORY: SOB in setting of HF exacerbation    COMPARISON: 3/15/2021    FINDINGS: Single view of the chest. Left chest wall cardiac device  lead in the right ventricle. LVAD. Median sternotomy wires are intact.  Trachea is midline. Cardiomediastinal silhouette is within normal  limits. Mild perihilar interstitial opacities. No focal consolidation.  No large pleural effusion. No appreciable pneumothorax.      Impression    IMPRESSION:   Mild perihilar atelectasis/edema. No focal consolidative opacity.    I have personally  reviewed the examination and initial interpretation  and I agree with the findings.    OSITO BRAVO MD         SYSTEM ID:  [id number]     Medications     bumetanide Stopped (09/03/22 6440)     - MEDICATION INSTRUCTIONS -       ACE/ARB/ARNI NOT PRESCRIBED       BETA BLOCKER NOT PRESCRIBED         allopurinol  200 mg Oral or Feeding Tube Daily     amiodarone  200 mg Oral Daily     amLODIPine  10 mg Oral Daily     aspirin  81 mg Oral Daily     atorvastatin  20 mg Oral Daily     bumetanide  3 mg Intravenous Once     cefadroxil  500 mg Oral BID     finasteride  5 mg Oral Daily     FLUoxetine  30 mg Oral Daily     hydrALAZINE  100 mg Oral TID     insulin aspart  1-3 Units Subcutaneous TID AC     insulin aspart  1-3 Units Subcutaneous At Bedtime     isosorbide mononitrate  90 mg Oral Daily     levothyroxine  75 mcg Oral QAM AC     magnesium oxide  400 mg Oral Q4H     magnesium oxide  400 mg Oral or Feeding Tube Daily     multivitamin RENAL  1 capsule Oral Daily     pantoprazole  40 mg Oral QAM AC     potassium chloride ER  60 mEq Oral TID     senna-docusate  1 tablet Oral BID     tamsulosin  0.8 mg Oral Daily     warfarin ANTICOAGULANT  2 mg Oral ONCE at 18:00     Warfarin Therapy Reminder  1 each Oral See Admin Instructions

## 2022-09-03 NOTE — PHARMACY-ANTICOAGULATION SERVICE
Clinical Pharmacy - Warfarin Dosing Consult     Pharmacy has been consulted to manage this patient s warfarin therapy.  Indication: LVAD/RVAD  Therapy Goal:  (INR 1.7-2.3)  OP Anticoag Clinic: [facility]  Warfarin Prior to Admission: Yes  Warfarin PTA Regimen: 4 mg every Sun, Wed; 2 mg all other days  Significant drug interactions: Increase INR: amiodarone   Increase bleeding risk: aspirin    INR   Date Value Ref Range Status   09/02/2022 1.95 (H) 0.85 - 1.15 Final     INR HOME MONITORING   Date Value Ref Range Status   09/01/2022 2.1 1.800 - 2.300 Final     Chromogenic Factor 10   Date Value Ref Range Status   02/23/2021 31 (L) 70 - 130 % Final     Comment:     Therapeutic Range:  A Chromogenic Factor 10 level of approximately 20-40%   inversely correlates with an INR of 2-3 for patients receiving Warfarin.   Chromogenic Factor 10 levels below 20% indicate an INR greater than 3 and   levels above 40% indicate an INR less than 2.         Recommend no dose of warfarin at this time as patient may have cath procedure.  Pharmacy will monitor Eliseo ALISTAIR Cunninghamse daily and order warfarin doses to achieve specified goal.      Please contact pharmacy as soon as possible if the warfarin needs to be held for a procedure or if the warfarin goals change.      Nikita Dove, PharmD, BCIDP  Ascom: [phone]

## 2022-09-03 NOTE — PROGRESS NOTES
"CLINICAL NUTRITION SERVICES    Received consult: \"Congestive Heart Failure (CHF) - Dietitian to instruct patient on 2 gram sodium diet\".  Pt with other cares during 3 attempts to visit.  RD will attempt back for education at a later time as able.    RD will continue to follow per protocol.    Alexa Johnson RD, , LD  Weekend/Holiday RD Pager: [phone]   "

## 2022-09-03 NOTE — PLAN OF CARE
Neuro: A&Ox4. Pleasant.  Cardiac: Afebrile, VSS. LVAD HM3@5500, no alarms. Paced rhythm. BPs stable   Respiratory: Placed O2 on this AM bc sats at rest were 88%. Once awake, sats improved and oxygen removed. Wears home cpap at night.  GI/: Voiding spontaneously. BM today.  Diet/appetite: 2gm Na diet and 2L FR. Good appetite. Denies nausea.  Activity: Up with SBA.  Pain: Denies   Skin: No new deficits noted. Bruising on arms. Driveline dressing changed by circ RN.  Lines: PIV with Bumex@1mg/hr.  Replacements: K and Mg replaced this shift. Labs drawn in afternoon, reported to next RN.

## 2022-09-03 NOTE — PROGRESS NOTES
Responded to Pt request for  support made during admission. Upon entering the room Pt was winding up therapy with OT, and needed to go for a walk which Pt asked  to join. After the walk I visited with Pt in Pt room, Pt shared experiences with illness, his strategies for coping and how his jayden has anchored him in the healing process. We explored hope, needs for emotional expressions, spiritual nourishment time and self care resources.  Pt shared about his family: wife Chapis, who was on the way to visit Pt, Pt daughter who will be getting  by end of the year, and Pt granddaughter who is in highschool and lives with Pt family.   Pt lives in [place], [place]/2 hours from the [facility].  While visiting Pt wife, Chapis arrived in the room. We had a brief visit.  Family are christians.  I let Pt/family know of spiritual support available, that they would let their bedside nurse know of their need for spiritual needs to contact     Plan:   Pt/family will benefit from a follow up by Unit  during this hospitalization

## 2022-09-03 NOTE — PROGRESS NOTES
Admitting to 6C, report called. Heartmate 3. Edematous throughout. Bumex gtt infusing. Chest xray completed. Alert and orient. No complaint of nausea or discomfort. Respiratory status stable. Vital signs within normal limits.

## 2022-09-04 NOTE — PLAN OF CARE
Neuro: A&Ox4. Pleasant.  Cardiac: Afebrile, VSS. Paced. HM3 speed increased to 5700 this shift. No LVAD alarms.  Respiratory: RA, some SOB with activity.  GI/: Voiding spontaneously in urinal. BM this morning.  Diet/appetite: Tolerating 2gm Na and 2L FR diet. BGs AC/HS. Denies nausea.  Activity: Up with SBA. Ambulated in sosa this afternoon.  Pain: Denies   Skin: No new deficits noted. Driveline dressing changed. Small reddened area from dressing tape. Cavilon no sting used on site and area covered with gauze to avoid direct contact with tape. Driveline drainage noted with dressing change, purulent color.  Lines: L PIV with bumex@2mg/hr. One time dose of IV diuril given with good response.  Drains:   Replacements: Scheduled potassium given. No supplemental potassium needed. First dose of supplemental Mg given.  Plan: Cards to order second dose of diuril for this afternoon.

## 2022-09-04 NOTE — PROGRESS NOTES
[facility]    Cardiology Progress Note- Cardiology        Date of Admission:  9/2/2022     Assessment & Plan: SL   Eliseo Tanner is a 67 year old male admitted on 9/2/2022. He has a history of chronic systolic HF 2/2 NICM s/p LVAD 2/2020, CAD, CKD 3B, afib, T2DM who is admitted with weight gain and increasing peripheral edema c/w CHF exacerbation.    Changes today:  - continue bumex gtt @ 2 mg/hr  - 1x diuril 500 mg IV this morning, 2nd dose 500 mg IV again this afternoon  - q8hr bmp/mg/phos (0500, 1300, 2100) with high replacement protocols in place  - increased LVAD speed 5500 --> 5600 (9/3) --> 5700 (9/4)    Decompensated systolic heart failure secondary to NICM s/p HM3 LVAD as DT on 2/18/2020  Cardiorenal syndrome  Stage D. NYHA Class III.  P/w signs and sx hypervolemia, weight 218 lbs (dry weight 210-211 lbs), ongoing edema, SOB with bibasilar crackles however no hypoxia. NT-BNP 6484 on admit, last 4416 in 2/2022. Started bumex gtt. Planned outpt cardiomems per Dr. Cisneros, RHC scheduled for 9/20.  Moderate MR on Echo 9/3/22 and PCWP 20 mmHg on last RHC 2/10/22 consistent with cardiorenal syndrome.  >>>  Fluid status: hypervolemic    Diuresis: holding PTA PO bumex and diuril    bumex loading dose 4 mg IV in ED    bumex gtt started on admission @ 0.5 mg/hr --> 1 mg/hr --> 2 mg/hr on 9/3    1x bumex 3 mg IV 9/3    1x diuril 500 mg IV 9/4    No lymphedema wraps yet, concern for precipitating pulm edema given how hypervolemic he is. Will continue to monitor volume status and start wraps when clinically appropriate.  ACEi/ARB/ARNI: contraindicated due to renal dysfunction (none PTA), will consider resuming entresto prior to discharge if renal function continues to improve  BB: None due to history of shock.  Aldosterone antagonist: contraindicated due to renal dysfunction (none PTA)  SCD prophylaxis: ICD  NSAID use: none  Sleep Apnea Evaluation: has ABHINAV, uses CPAP  at home - continue inpatient  BP: amlodipine 10 mg daily, hydralazine 100 g 3 times daily, long-acting isosorbide 90 mg daily - cont as BPs allow   LDH trends: 290-310 for past year, last 253 5/2022, recheck on admission 273; will monitor q48 hrs  Anticoagulation: warfarin INR goal 1.7-2.3, pharmacy dosing  Antiplatelet: ASA 81 mg daily - continue   RV support: none  Lytes:    q8h bmp/mg/phos (0500, 1300, 2100) with high replacement protocols    continue scheduled 60 mEq K BID --> TID on 9/3    scheduled Mag 400 mg daily at admission, discontinued 9/4, will replace per protocol  LVAD: HeartMate 3    Flow 4.6 LPM    PI 3.1    Speed 5500 rpm --> increased to 5600 on 9/3 --> 5700 on 9/4  Echo LVAD 9/3/22:    LV EF 10-15%, LVEDd 6.0 cm (compared to 5.5 cm 2/10/2022)    RV overload, moderate dilation, moderately reduced function    mild-moderate mitral insufficiency    mild aortic insufficiency    IVC dilated >2.1 cm c/w high RA pressure ?15 mmHg    Hyponatremia, mild  Na 133 on admission, fluctuating 133-136 for past 1 month.  - trend BMP while diuresing as above    Chronic MSSA drive line infection  Baseline leakage from LVAD site, no fevers, does not meet SIRS criteria.  - cont chronic suppressive therapy with cefadroxil 500 mg BID    CKD, stage 3B  Cr 2, recent baseline 1.8-2.1 per chart review. Has been steadily trending up over past few months. Follows with nephrology.  - trend while diuresing  - avoid nephrotoxins  - cont PTA nephrocaps    Hx of Afib w/ RVR and aberrancy vs care home vs AT vs slow VT  S/p DCCV on 2/28/21 back into sinus rhythm. Sinus bradycardia with increased RV pacing in 2/2022 so increased lower pacer rate from 40 to 60 and turned on rate response.  Most recent device interrogation (5/3/22) AF burden 0%.  - continue amiodarone 200 mg daily  - AC with warfarin as above  - cardiac device interrogation if still inpatient on Tuesday 9/6    CAD  Mild to moderate CAD with severe branch disease per [facility]  11/2019.  - cont ASA, statin    Hypothyroidism  Last TSH 10.5 on 8/3/22. On amiodarone as above  - recheck TSH 8.06 on 9/3  - PTA levothyroxine 50 mcg daily --> increased to 75 mcg 9/3  - will need recheck TSH in 4 weeks as outpatient (~10/5/22)    DM II  A1c 6 on 8/25/22.  Previously on 10 units glargine, appears this may have been recently discontinued and dapagliflozin started by PCP.  - hold dapagliflozin  - LDSSI    Insomnia   Reports he cannot sleep in hospital without pta ambien. States he has tried trazodone and melatonin without any success.  - cont PTA ambien, low threshold to hold if any concerns for delirium    BPH, urinary retention  - cont PTA finasteride, tamsulosin    GERD  - cont PTA PPI    Gout  - cont PTA allopurinol    IgG Kappa monoclonal gammopathy of undetermined significance  - Followed by heme/onc outpt    The patient's care was discussed with the Attending Physician, Dr. Salmeron.    Heather Hall MD  PGY-1 Internal Medicine  Cards 2 Service  ______________________________________________________________________    Interval History   NAOE. Overall doing well this morning, still feels volume up. Mild abdominal tenderness.  Last bowel movement 9/3. No chest pain, no SOB, used home CPAP overnight.    Data reviewed today: I reviewed all medications, new labs and imaging results over the last 24 hours.    Physical Exam   Vital Signs: Temp: 97.3  F (36.3  C) Temp src: Axillary BP: (!) 114/96 Pulse: 59   Resp: 16 SpO2: 94 % O2 Device: None (Room air)    Weight: 217 lbs 1.6 oz     Temp:  [97.3  F (36.3  C)-98.1  F (36.7  C)] 97.3  F (36.3  C)  Pulse:  [59-63] 59  Resp:  [16-20] 16  BP: ()/(72-96) 114/96  Cuff Mean (mmHg):  [] 97  SpO2:  [88 %-94 %] 94 %    I/O last 3 completed shifts:  In: 1251.34 [P.O.:1137; I.V.:114.34]  Out: 3020 [Urine:3020]    Gen: awake, alert, NAD  Resp: No increased work of breathing on room air, no accessory muscle use  Cardio: appreciate LVAD, BLE 2+ pitting  edema to knees, JVD to earlobe while sitting upright  GI: distended but soft, mild diffuse tenderness, driveline dressing c/d/i  Psych: cooperative, mood and affect congruent      Data   Recent Labs   Lab 22  0916 22  0758 22  0633 22  2149 22  1700 22  1402 22  0817 22  0630 22  22022  1658 22  1656   WBC  --  6.6 6.5  --   --   --   --   --  7.1  --   --  8.4   HGB  --  13.1* 13.4  --   --   --   --   --  12.4*  --   --  13.4   MCV  --  92 91  --   --   --   --   --  91  --   --  91   PLT  --  238 240  --   --   --   --   --  232  --   --  236   INR  --   --  1.97*  --   --   --   --   --  2.16*  --  1.95*  --    NA  --   --  136  --   --   --   --   --  136  --   --  133*   POTASSIUM  --   --  3.9  3.9 4.0  --   --  4.0  4.0  --  3.1*  3.1*   < >  --  3.7   CHLORIDE  --   --  101  --   --   --   --   --  99  --   --  95*   CO2  --   --  23  --   --   --   --   --  24  --   --  26   BUN  --   --  43.7*  --   --   --   --   --  45.1*  --   --  48.8*   CR  --   --  1.76*  --   --   --   --   --  1.79*  --   --  2.00*   ANIONGAP  --   --  12  --   --   --   --   --  13  --   --  12   CALOS  --   --  8.2*  --   --   --   --   --  7.8*  --   --  8.4*   *  --  127*  --  154*   < >  --    < > 118*   < >  --  175*    < > = values in this interval not displayed.     Recent Results (from the past 24 hour(s))   Echo Complete    Narrative    [id number]  [id number]  [id number]  [id number]^MARA^MAL^ANKUR     [facility],[place]  Echocardiography Laboratory  [address] [place] [id number]     Name: WOLFGANG LEACH  MRN: [id number]  : 1955  Study Date: 2022 09:21 AM  Age: 67 yrs  Gender: Male  Patient Location: [facility] [place]  Reason For Study: Edema  Ordering Physician: MAL TERRY  Performed By: Sj Guillen RDCS     BSA: 2.1 m2  Height: 67 in  Weight: 219 lb  HR: 61  BP: 93/78  mmHg  ______________________________________________________________________________  Procedure  LVAD Echocardiogram with two-dimensional, color and spectral Doppler  performed.  ______________________________________________________________________________  Interpretation Summary  HMIII at 5500RPM.     Left ventricular size is mildly dilated in size. LVEDd is 6.0 cm. The visual  ejection fraction is 10-15%. Severe diffuse hypokinesis is present. LVAD  cannula noted in the LV apex. Normal inflow velocities.  There is evidence of diastolic septal flattening suggestive of RV volume  overload. Outflow graft velocity normal.  The RV is not well visualized, there appears to be moderate RV dilation and  moderately reduced RV function.  Mild to moderate mitral insufficiency is present.  The aortic valve partially opens with each cardiac cycle with more significant  opening every third cardiac cycle. There is mild AI.  IVC diameter >2.1 cm collapsing <50% with sniff suggests a high RA pressure  estimated at 15 mmHg or greater.  No pericardial effusion is present.     This study was compared with the study from 2/10/2022. The LV is larger in  size with LVEDd of 6.0 cm from 5.5 cm, the IVC is dilated suggestive of  elevated RA filling pressures.  ______________________________________________________________________________  Tricuspid Valve  Mild tricuspid insufficiency is present.     Vessels  IVC diameter >2.1 cm collapsing <50% with sniff suggests a high RA pressure  estimated at 15 mmHg or greater.     Pericardium  No pericardial effusion is present.     Compared to Previous Study  This study was compared with the study from 2/10/2022 . The LV is larger in  size with LVEDd of 6.0 cm from 5.5 cm, the IVC is dilated suggestive of  elevated RA filling pressures.     ______________________________________________________________________________  MMode/2D Measurements & Calculations  IVSd: 1.1 cm  LVIDd: 6.0 cm  LVIDs: 5.3  cm  LVPWd: 1.1 cm     FS: 12.5 %  LV mass(C)d: 280.5 grams  LV mass(C)dI: 133.5 grams/m2  RWT: 0.36     Doppler Measurements & Calculations  PA acc time: 0.11 sec  TR max saumya: 198.7 cm/sec  TR max PG: 15.8 mmHg     ______________________________________________________________________________  Report approved by: MD Virgilio Lujan 09/03/2022 10:45 AM           Medications     bumetanide 2 mg/hr (09/04/22 0913)     - MEDICATION INSTRUCTIONS -       ACE/ARB/ARNI NOT PRESCRIBED       BETA BLOCKER NOT PRESCRIBED         allopurinol  200 mg Oral or Feeding Tube Daily     amiodarone  200 mg Oral Daily     amLODIPine  10 mg Oral Daily     aspirin  81 mg Oral Daily     atorvastatin  20 mg Oral Daily     cefadroxil  500 mg Oral BID     chlorothiazide  500 mg Intravenous Once     finasteride  5 mg Oral Daily     FLUoxetine  30 mg Oral Daily     hydrALAZINE  100 mg Oral TID     insulin aspart  1-3 Units Subcutaneous TID AC     insulin aspart  1-3 Units Subcutaneous At Bedtime     isosorbide mononitrate  90 mg Oral Daily     levothyroxine  75 mcg Oral QAM AC     magnesium oxide  400 mg Oral Q4H     multivitamin RENAL  1 capsule Oral Daily     pantoprazole  40 mg Oral QAM AC     potassium chloride ER  60 mEq Oral TID     senna-docusate  1 tablet Oral BID     tamsulosin  0.8 mg Oral Daily     Warfarin Therapy Reminder  1 each Oral See Admin Instructions

## 2022-09-04 NOTE — PLAN OF CARE
Temp: 97.3  F (36.3  C) Temp src: Axillary BP: (!) 114/96 Pulse: 59   Resp: 16 SpO2: 94 % O2 Device: None (Room air)       D: hypervolemia, SOB, CHF exacerbation  History of LVAD (HM III), ICD, CAD, NICM, Afib, HTN, HLD, CKD, T2DM, GERD, ABHINAV    I/A: Pt is AOx4. Tele in place, paced. VSS on RA. Left PIV in place, infusing bumex. Magnesium 1.9 @ 2230, replaced. Up independently for bedside urinal. Slept well overnight.    Running: bumex @ 2 mg/hr    P: Continue to monitor and follow POC. Plan for continued diuresis. Notify CARDS 2 with changes.

## 2022-09-04 NOTE — PLAN OF CARE
Goal Outcome Evaluation:    Plan of Care Reviewed With: patient, spouse     Overall Patient Progress: no change     Shift summary    D:Pt admitted with weight gain,SOB and edema.Pt has PMH of heart mate 3.     A/I: Pt has been in paced rhythm other VSS. Pt's heart mate numbers WNL. Pt's speed increased to 5600 today, no significant changes in flow or PI's. Dressing changed earlier today.Pt's bumex gtt increased to 2 mg/hr. Pt given 3mg bumex bolus IV and oral diuril restarted. Pt has not had significant increase in UO but is voiding. Pt's abdomen edematous and LE 2-3+ with some weeping from right leg. Lotion and oil applied to help with expansion. Pt has denied any c/o pain. Pt on mag and potassium replacement protocol. Potassium increased to 60 meq  TID. Pending k and mag at 2230. Pt uses own CPAP at night    P: Continue to diuresis and monitor labs ,UO and weight. Notify MD's with concernsa or questions.

## 2022-09-05 NOTE — PROGRESS NOTES
[facility]    Cardiology Progress Note- Cardiology        Date of Admission:  9/2/2022     Assessment & Plan: SL   Eliseo Tanner is a 67 year old male admitted on 9/2/2022. He has a history of chronic systolic HF 2/2 NICM s/p LVAD 2/2020, CAD, CKD 3B, afib, T2DM who is admitted with weight gain and increasing peripheral edema c/w CHF exacerbation.    Changes today:  - continue bumex gtt @ 2 mg/hr  - 1x diuril 500 mg IV this morning, 1x diuril 1g IV this afternoon + additional KCl 40 mEq x1  - q8hr bmp/mg/phos (0500, 1300, 2100) with high replacement protocols in place + scheduled KCl 60 mEq TID  - increased LVAD speed 5500 --> 5600 (9/3) --> 5700 (9/4) --> 5800 (9/5)    Decompensated systolic heart failure secondary to NICM s/p HM3 LVAD as DT on 2/18/2020  Cardiorenal syndrome  Stage D. NYHA Class III.  P/w signs and sx hypervolemia, weight 218 lbs (dry weight 210-211 lbs), ongoing edema, SOB with bibasilar crackles however no hypoxia. NT-BNP 6484 on admit, last 4416 in 2/2022. Started bumex gtt. Planned outpt cardiomems per Dr. Cisneros, RHC scheduled for 9/20.  Moderate MR on Echo 9/3/22 and PCWP 20 mmHg on last RHC 2/10/22 consistent with cardiorenal syndrome.  >>>  Fluid status: hypervolemic    Diuresis: holding PTA PO bumex and diuril    bumex loading dose 4 mg IV in ED    bumex gtt started on admission @ 0.5 mg/hr --> 1 mg/hr --> 2 mg/hr on 9/3    1x bumex 3 mg IV 9/3    2x diuril 500 mg IV 9/4    1x diuril 500 mg IV 9/5    No lymphedema wraps yet, concern for precipitating pulm edema given how hypervolemic he is. Will continue to monitor volume status and start wraps when clinically appropriate.  ACEi/ARB/ARNI: contraindicated due to renal dysfunction (none PTA), will consider resuming entresto prior to discharge if renal function continues to improve  BB: None due to history of shock.  Aldosterone antagonist: contraindicated due to renal dysfunction (none  PTA)  SCD prophylaxis: ICD  NSAID use: none  Sleep Apnea Evaluation: has ABHINAV, uses CPAP at home - continue inpatient  BP: amlodipine 10 mg daily, hydralazine 100 g 3 times daily, long-acting isosorbide 90 mg daily - cont as BPs allow  LDH trends: 290-310 for past year, last 253 5/2022, recheck on admission 273; will monitor q48 hrs  Anticoagulation: warfarin INR goal 1.7-2.3, pharmacy dosing  Antiplatelet: ASA 81 mg daily - continue   RV support: none  Lytes:    q8h bmp/mg/phos (0500, 1300, 2100) with high replacement protocols    continue scheduled 60 mEq K BID --> TID on 9/3    scheduled Mag 400 mg daily at admission, discontinued 9/4, will replace per protocol  LVAD: HeartMate 3    Flow 5.0 LPM    PI 3.0    Speed 5500 rpm --> increased to 5600 on 9/3 --> 5700 on 9/4 --> 5800 on 9/5  Echo LVAD 9/3/22:    LV EF 10-15%, LVEDd 6.0 cm (compared to 5.5 cm 2/10/2022)    RV overload, moderate dilation, moderately reduced function    mild-moderate mitral insufficiency    mild aortic insufficiency    IVC dilated >2.1 cm c/w high RA pressure ?15 mmHg    Hyponatremia, mild  Na 133 on admission, fluctuating 133-136 for past 1 month.  - trend BMP while diuresing as above    Chronic MSSA drive line infection  Baseline leakage from LVAD site, no fevers, does not meet SIRS criteria.  - cont chronic suppressive therapy with cefadroxil 500 mg BID    CKD, stage 3B  Cr 2, recent baseline 1.8-2.1 per chart review. Has been steadily trending up over past few months. Follows with nephrology.  - trend while diuresing  - avoid nephrotoxins  - cont PTA nephrocaps    Hx of Afib w/ RVR and aberrancy vs [facility] vs AT vs slow VT  S/p DCCV on 2/28/21 back into sinus rhythm. Sinus bradycardia with increased RV pacing in 2/2022 so increased lower pacer rate from 40 to 60 and turned on rate response.  Most recent device interrogation (5/3/22) AF burden 0%.  - continue amiodarone 200 mg daily  - AC with warfarin as above  - cardiac device interrogation  if still inpatient on Tuesday 9/6    CAD  Mild to moderate CAD with severe branch disease per [facility] 11/2019.  - cont ASA, statin    Hypothyroidism  Last TSH 10.5 on 8/3/22. On amiodarone as above  - recheck TSH 8.06 on 9/3  - PTA levothyroxine 50 mcg daily --> increased to 75 mcg 9/3  - will need recheck TSH in 4 weeks as outpatient (~10/5/22)    DM II  A1c 6 on 8/25/22.  Previously on 10 units glargine, appears this may have been recently discontinued and dapagliflozin started by PCP.  - hold dapagliflozin  - LDSSI    Insomnia   Reports he cannot sleep in hospital without pta ambien. States he has tried trazodone and melatonin without any success.  - cont PTA ambien, low threshold to hold if any concerns for delirium    BPH, urinary retention  - cont PTA finasteride, tamsulosin    GERD  - cont PTA PPI    Gout  - cont PTA allopurinol    IgG Kappa monoclonal gammopathy of undetermined significance  - Followed by heme/onc outpt    The patient's care was discussed with the Attending Physician, Dr. Salmeron.    Heather Hall MD  PGY-1 Internal Medicine  Cards 2 Service  ______________________________________________________________________    Interval History   NAOE. Feeling well this morning. No chest pain, SOB, abdominal pain. Last bowel movement yesterday. Up and walking around. Excited about being down almost 10 pounds from yesterday.    Data reviewed today: I reviewed all medications, new labs and imaging results over the last 24 hours.    Physical Exam   Vital Signs: Temp: 97.4  F (36.3  C) Temp src: Oral BP: 97/88 Pulse: 59   Resp: 16 SpO2: 94 % O2 Device: None (Room air)    Weight: 209 lbs 6.4 oz     Temp:  [97  F (36.1  C)-98.4  F (36.9  C)] 97.4  F (36.3  C)  Pulse:  [59-63] 59  Resp:  [16-20] 16  BP: ()/(63-88) 97/88  Cuff Mean (mmHg):  [82-95] 84  SpO2:  [93 %-95 %] 94 %    I/O last 3 completed shifts:  In: 1142 [P.O.:950; I.V.:192]  Out: 5380 [Urine:5380]    Gen: awake, alert, NAD  Resp: No increased  work of breathing on room air, no accessory muscle use  Cardio: appreciate LVAD, BLE 2+ pitting edema to knees, JVD to earlobe while sitting upright  GI: distended but soft, nontender  Psych: cooperative, mood and affect congruent    Data   Recent Labs   Lab 09/05/22 0618 09/04/22 2122 09/04/22 2119 09/04/22  1708 09/04/22  1304 09/04/22  0916 09/04/22  0758 09/04/22  0633 09/03/22  0817 09/03/22  0630   WBC 7.0  --   --   --   --   --  6.6 6.5  --  7.1   HGB 13.3  --   --   --   --   --  13.1* 13.4  --  12.4*   MCV 93  --   --   --   --   --  92 91  --  91     --   --   --   --   --  238 240  --  232   INR 1.83*  --   --   --   --   --   --  1.97*  --  2.16*     --  139  --  138  --   --  136  --  136   POTASSIUM 3.4  --  3.5  --  3.9  --   --  3.9  3.9   < > 3.1*  3.1*   CHLORIDE 101  --  101  --  101  --   --  101  --  99   CO2 26  --  26  --  27  --   --  23  --  24   BUN 42.8*  --  47.2*  --  43.3*  --   --  43.7*  --  45.1*   CR 1.78*  --  1.84*  --  1.81*  --   --  1.76*  --  1.79*   ANIONGAP 13  --  12  --  10  --   --  12  --  13   CALOS 8.8  --  9.0  --  8.7*  --   --  8.2*  --  7.8*   * 145* 138*   < > 125*   < >  --  127*   < > 118*    < > = values in this interval not displayed.     No results found for this or any previous visit (from the past 24 hour(s)).  Medications     bumetanide 2 mg/hr (09/05/22 0502)     - MEDICATION INSTRUCTIONS -       ACE/ARB/ARNI NOT PRESCRIBED       BETA BLOCKER NOT PRESCRIBED         allopurinol  200 mg Oral or Feeding Tube Daily     amiodarone  200 mg Oral Daily     amLODIPine  10 mg Oral Daily     aspirin  81 mg Oral Daily     atorvastatin  20 mg Oral Daily     cefadroxil  500 mg Oral BID     finasteride  5 mg Oral Daily     FLUoxetine  30 mg Oral Daily     hydrALAZINE  100 mg Oral TID     insulin aspart  1-3 Units Subcutaneous TID AC     insulin aspart  1-3 Units Subcutaneous At Bedtime     isosorbide mononitrate  90 mg Oral Daily      levothyroxine  75 mcg Oral QAM AC     multivitamin RENAL  1 capsule Oral Daily     pantoprazole  40 mg Oral QAM AC     potassium chloride  60 mEq Oral TID     senna-docusate  1 tablet Oral BID     tamsulosin  0.8 mg Oral Daily     warfarin ANTICOAGULANT  4 mg Oral ONCE at 18:00     Warfarin Therapy Reminder  1 each Oral See Admin Instructions

## 2022-09-05 NOTE — PLAN OF CARE
Goal Outcome Evaluation:    Plan of Care Reviewed With: patient     Overall Patient Progress: no change    Shift summary [id number]    D:Pt admitted with fluid overload,SOB and weight gain. Pt has PMH significant with heart mate 3.    A/I: Pt continues to be edematous through his abdomen and in LE.    (3+). Pt has been in paced rhythm with stable VS. Pt's heart mate turned up to 5700, PI and flows remain stable. Pt continues on bumex gtt at 2 mg/hr. Pt also received 2 IV doses of diuril and 1 PO dose. UO fair.Pt's lungs remain clear ,sating well on RA. Pt denies any c/o pain. Pt sleeping in recliner.Pt gets up independently and uses call light appropriately.  P: continue current POC and diuresis,,monitor electrolytes

## 2022-09-05 NOTE — PROGRESS NOTES
D: Fluid Overload and SOB    I: Monitored vitals and assessed pt status.   Changed: Switched to solution K+ Vs Tablets.  Running: Bumex 2mg/hr (8mL/hr)  PRN: 1x dose Diuril, K+ replaced per protocol  Tele: Paced @ 60 bpm - LVAD numbers stable  O2: RA  Mobility: Independent with walking stick    A: A0x4. VSS. Afebrile. Urinating adequately. Makes needs known. Tolerating current regimen for fluid overload.     Potentially initiate lymph wraps on lower extremities in next few days pending fluid status.     P: Continue to monitor Pt status and report changes to Cards 2.    Temp:  [97.4  F (36.3  C)-98.4  F (36.9  C)] 97.5  F (36.4  C)  Pulse:  [59-63] 59  Resp:  [16-20] 16  BP: ()/(63-88) 107/86  Cuff Mean (mmHg):  [82-84] 84  SpO2:  [93 %-95 %] 95 %

## 2022-09-05 NOTE — PLAN OF CARE
Temp: 97.4  F (36.3  C) Temp src: Oral BP: 97/88 Pulse: 59   Resp: 16 SpO2: 94 % O2 Device: None (Room air)       D: hypervolemia, SOB, CHF exacerbation  History of LVAD (HM III), ICD, CAD, NICM, Afib, HTN, HLD, CKD, T2DM, GERD, ABHINAV    I/A: Pt is AOx4. Tele in place, paced. VSS on RA and home CPAP while sleeping. Left PIV in place, infusing Bumex. New bag hung @ 0500. Potassium 3.5 @ 2120, replaced. Up independently for bedside urinal. Slept well overnight.    Running: Bumex @ 2 mg/hr    P: Continue to monitor and follow POC. Plan for continued diuresis. Notify CARDS 2 with changes.    Note originally written by Paty Valenzuela RN, Changed the infusion from heparin to Bumex. Pt is not on Heparin IV

## 2022-09-06 NOTE — PLAN OF CARE
Temp: 97.6  F (36.4  C) Temp src: Oral BP: 95/80 Pulse: 59   Resp: 16 SpO2: 97 % O2 Device: None (Room air)       D: hypervolemia, SOB, CHF exacerbation  History of LVAD (HM III), ICD, CAD, NICM, Afib, HTN, HLD, CKD, T2DM, GERD, ABHINAV    I/A: Pt is AOx4. Tele in place, paced. VSS on RA and home CPAP while sleeping. Left PIV in place, infusing bumex. DL securement device coming loose, replaced securement device. Up independently for bedside urinal. Slept well overnight.    Running: bumex @ 2 mg/hr    P: Continue to monitor and follow POC. Plan for continued diuresis. Notify CARDS 2 with changes.

## 2022-09-06 NOTE — PROVIDER NOTIFICATION
D:pt experiencing cramping of the hands and thighs, evening labs drawn  I:repositioned and contacted cross cover  A:prn med ordered and given  P:per Primary

## 2022-09-06 NOTE — CONSULTS
Care Management Initial Consult    General Information  Assessment completed with: Patient,    Type of CM/SW Visit: Initial Assessment    Primary Care Provider verified and updated as needed: Yes   Readmission within the last 30 days: no previous admission in last 30 days         Advance Care Planning:        No document. Pt has been given information in the past.     Communication Assessment  Patient's communication style: spoken language (English or Bilingual)    Hearing Difficulty or Deaf: yes   Wear Glasses or Blind: yes    Cognitive  Cognitive/Neuro/Behavioral: WDL                      Living Environment:   People in home: spouse, other (see comments) (grand daughter)  [place]  Current living Arrangements: house      Able to return to prior arrangements: yes       Family/Social Support:  Care provided by: self, spouse/significant other (wife does dressing change)  Provides care for: grandchild(arnold)  Marital Status:   Wife, Children  [place]apis       Description of Support System: Supportive, Involved    Support Assessment: Adequate family and caregiver support, Adequate social supports    Current Resources:   Patient receiving home care services: No     Community Resources: [facility]  Equipment currently used at home: raised toilet seat, cane, straight  Supplies currently used at home: Wound Care Supplies    Employment/Financial:  Employment Status: retired        Financial Concerns: No concerns identified   Referral to Financial Worker: No       Lifestyle & Psychosocial Needs:  Social Determinants of Health     Tobacco Use: Medium Risk     Smoking Tobacco Use: Former Smoker     Smokeless Tobacco Use: Never Used   Alcohol Use: Not on file   Financial Resource Strain: Not on file   Food Insecurity: Not on file   Transportation Needs: Not on file   Physical Activity: Not on file   Stress: Not on file   Social Connections: Not on file   Intimate Partner Violence: Not on file   Depression: Not at risk     PHQ-2  Score: 0   Housing Stability: Not on file       Functional Status:  Prior to admission patient needed assistance:   Dependent ADLs:: Independent  Dependent IADLs:: Independent       Mental Health Status:  Mental Health Status: No Current Concerns       Chemical Dependency Status:  Chemical Dependency Status: No Current Concerns             Values/Beliefs:  Spiritual, Cultural Beliefs, [employer] Practices, Values that affect care:                 Additional Information:  Pt well known to writer from LVAD program. Pt admitted for aggressive diuresis, which he reports he has been trying to manage at home since about May.  Pt had LVAD implanted 218/2020. PTA, pt was living at home with his wife and granddaughter. Pt had no community supports and was independent w/ ADLs and IADLs.  Pt's wife was doing LVAD dressing changes.     Pt anticipates discharge end of week or weekend to home w/o any additional needs. SW ti continue to follow to continue to assess for discharge needs and emotional support during hospitalization.     Cintia Perkins, VERÓNICASW

## 2022-09-06 NOTE — PROGRESS NOTES
[facility]    Cardiology Progress Note- Cardiology        Date of Admission:  9/2/2022     Assessment & Plan: SL   Eliseo Tanner is a 67 year old male admitted on 9/2/2022. He has a history of chronic systolic HF 2/2 NICM s/p LVAD 2/2020, CAD, CKD 3B, afib, T2DM who is admitted with weight gain and increasing peripheral edema c/w CHF exacerbation.    Changes today:  - continue bumex gtt @ 2 mg/hr and diuril today to continue diuresis   - increased LVAD speed 5500 --> 5600 (9/3) --> 5700 (9/4) --> 5800 (9/5)    Decompensated systolic heart failure secondary to NICM s/p HM3 LVAD as DT on 2/18/2020  Cardiorenal syndrome  Stage D. NYHA Class III.  P/w signs and sx hypervolemia, weight 218 lbs (dry weight ??200 lbs), ongoing edema, SOB with bibasilar crackles however no hypoxia. NT-BNP 6484 on admit, last 4416 in 2/2022. Started bumex gtt. Planned outpt cardiomems per Dr. Cisneros, RHC scheduled for 9/20.  Moderate MR on Echo 9/3/22 and PCWP 20 mmHg on last RHC 2/10/22 consistent with cardiorenal syndrome.  >>>  Fluid status: hypervolemic    Diuresis: holding PTA PO bumex and diuril    bumex loading dose 4 mg IV in ED    bumex gtt started on admission @ 0.5 mg/hr --> 1 mg/hr --> 2 mg/hr on 9/3    1x bumex 3 mg IV 9/3    2x diuril 500 mg IV 9/4    1x diuril 500 mg IV 9/5    No lymphedema wraps yet, concern for precipitating pulm edema given how hypervolemic he is. Will continue to monitor volume status and start wraps when clinically appropriate.  ACEi/ARB/ARNI: contraindicated due to renal dysfunction (none PTA), will consider resuming entresto prior to discharge if renal function continues to improve  BB: None due to history of shock.  Aldosterone antagonist: contraindicated due to renal dysfunction (none PTA)  SCD prophylaxis: ICD  NSAID use: none  Sleep Apnea Evaluation: has ABHINAV, uses CPAP at home - continue inpatient  BP: amlodipine 10 mg daily, hydralazine 100 g 3  times daily, long-acting isosorbide 90 mg daily - cont as BPs allow  LDH trends: 290-310 for past year, last 253 5/2022, recheck on admission 273; will monitor q48 hrs  Anticoagulation: warfarin INR goal 1.7-2.3, pharmacy dosing  Antiplatelet: ASA 81 mg daily - continue   RV support: none  Lytes:    q8h bmp/mg/phos (0500, 1300, 2100) with high replacement protocols    Increase to 80 mEq K TID     scheduled Mag 400 mg daily at admission, discontinued 9/4, will replace per protocol  LVAD: HeartMate 3    Flow 5.0 LPM    PI 3.0    Speed 5500 rpm --> increased to 5600 on 9/3 --> 5700 on 9/4 --> 5800 on 9/5  Echo LVAD 9/3/22:    LV EF 10-15%, LVEDd 6.0 cm (compared to 5.5 cm 2/10/2022)    RV overload, moderate dilation, moderately reduced function    mild-moderate mitral insufficiency    mild aortic insufficiency    IVC dilated >2.1 cm c/w high RA pressure ?15 mmHg    Hyponatremia, mild  Na 133 on admission, fluctuating 133-136 for past 1 month.  - trend BMP while diuresing as above    Chronic MSSA drive line infection  Baseline leakage from LVAD site, no fevers, does not meet SIRS criteria.  - cont chronic suppressive therapy with cefadroxil 500 mg BID    CKD, stage 3B  Cr 2, recent baseline 1.8-2.1 per chart review. Has been steadily trending up over past few months. Follows with nephrology.  - trend while diuresing  - avoid nephrotoxins  - cont PTA nephrocaps    Hx of Afib w/ RVR and aberrancy vs NATHALIA vs AT vs slow VT  S/p DCCV on 2/28/21 back into sinus rhythm. Sinus bradycardia with increased RV pacing in 2/2022 so increased lower pacer rate from 40 to 60 and turned on rate response.  Most recent device interrogation (5/3/22) AF burden 0%.  - continue amiodarone 200 mg daily  - AC with warfarin as above    CAD  Mild to moderate CAD with severe branch disease per [facility] 11/2019.  - cont ASA, statin    Hypothyroidism  Last TSH 10.5 on 8/3/22. On amiodarone as above  - recheck TSH 8.06 on 9/3  - PTA levothyroxine 50 mcg  daily --> increased to 75 mcg 9/3  - will need recheck TSH in 4 weeks as outpatient (~10/5/22)    DM II  A1c 6 on 8/25/22.  Previously on 10 units glargine, appears this may have been recently discontinued and dapagliflozin started by PCP.  - hold dapagliflozin  - LDSSI    Insomnia   Reports he cannot sleep in hospital without pta ambien. States he has tried trazodone and melatonin without any success.  - cont PTA ambien, low threshold to hold if any concerns for delirium    BPH, urinary retention  - cont PTA finasteride, tamsulosin    GERD  - cont PTA PPI    Gout  - cont PTA allopurinol    IgG Kappa monoclonal gammopathy of undetermined significance  - Followed by heme/onc outpt    The patient's care was discussed with the Attending Physician, Dr. Salmeron.    Get Burnett MD  Cardiology Fellow  Pager: [phone]    ______________________________________________________________________    Interval History   Feeling well no complaints     Data reviewed today: I reviewed all medications, new labs and imaging results over the last 24 hours.    Physical Exam   Vital Signs: Temp: 97.6  F (36.4  C) Temp src: Oral BP: 90/75 Pulse: 80   Resp: 16 SpO2: 94 % O2 Device: None (Room air)    Weight: 203 lbs 12.8 oz     Temp:  [97.6  F (36.4  C)-97.8  F (36.6  C)] 97.6  F (36.4  C)  Pulse:  [59-80] 80  Resp:  [16-18] 16  BP: ()/(75-93) 90/75  SpO2:  [94 %-97 %] 94 %    I/O last 3 completed shifts:  In: 1493 [P.O.:1245; I.V.:248]  Out: 6425 [Urine:6425]    Gen: awake, alert, NAD  Resp: No increased work of breathing on room air, no accessory muscle use  Cardio: appreciate LVAD hum, BLE 2+ pitting edema to knees, JVD to angle of jaw while sitting upright  GI: distended but soft, nontender  Psych: cooperative, mood and affect congruent    Data   Recent Labs   Lab 09/06/22  1141 09/06/22  0701 09/06/22  0633 09/05/22  2137 09/05/22  2127 09/05/22  1741 09/05/22  1323 09/05/22  1251 09/05/22  0618 09/04/22  0916 09/04/22  0758  09/04/22  0633   WBC  --   --  7.0  --   --   --   --   --  7.0  --  6.6 6.5   HGB  --   --  13.4  --   --   --   --   --  13.3  --  13.1* 13.4   MCV  --   --  91  --   --   --   --   --  93  --  92 91   PLT  --   --  252  --   --   --   --   --  243  --  238 240   INR  --   --  2.39*  --   --   --   --   --  1.83*  --   --  1.97*   NA  --   --  138  --  139  --  139  --  140   < >  --  136   POTASSIUM  --   --  3.1*  --  4.7  --  4.0  4.0  --  3.4   < >  --  3.9  3.9   CHLORIDE  --   --  96*  --  100  --  98  --  101   < >  --  101   CO2  --   --  29  --  27  --  29  --  26   < >  --  23   BUN  --   --  45.5*  --  50.7*  --  47.0*  --  42.8*   < >  --  43.7*   CR  --   --  1.87*  --  2.01*  --  1.79*  --  1.78*   < >  --  1.76*   ANIONGAP  --   --  13  --  12  --  12  --  13   < >  --  12   CALOS  --   --  9.0  --  9.2  --  9.4  --  8.8   < >  --  8.2*   * 166* 160*   < > 165*   < > 135*   < > 121*   < >  --  127*    < > = values in this interval not displayed.     No results found for this or any previous visit (from the past 24 hour(s)).  Medications     bumetanide 2 mg/hr (09/06/22 6967)     - MEDICATION INSTRUCTIONS -       ACE/ARB/ARNI NOT PRESCRIBED       BETA BLOCKER NOT PRESCRIBED         allopurinol  200 mg Oral or Feeding Tube Daily     amiodarone  200 mg Oral Daily     amLODIPine  10 mg Oral Daily     aspirin  81 mg Oral Daily     atorvastatin  20 mg Oral Daily     cefadroxil  500 mg Oral BID     finasteride  5 mg Oral Daily     FLUoxetine  30 mg Oral Daily     hydrALAZINE  100 mg Oral TID     insulin aspart  1-3 Units Subcutaneous TID AC     insulin aspart  1-3 Units Subcutaneous At Bedtime     isosorbide mononitrate  90 mg Oral Daily     levothyroxine  75 mcg Oral QAM AC     multivitamin RENAL  1 capsule Oral Daily     pantoprazole  40 mg Oral QAM AC     potassium chloride  80 mEq Oral TID     senna-docusate  1 tablet Oral BID     tamsulosin  0.8 mg Oral Daily     warfarin ANTICOAGULANT  2 mg  Oral ONCE at 18:00     Warfarin Therapy Reminder  1 each Oral See Admin Instructions

## 2022-09-06 NOTE — PLAN OF CARE
"Eliseo Tanner is a 67 year old male admitted on 9/2/2022. He has a history of chronic systolic HF 2/2 NICM s/p LVAD 2/2020, CAD, CKD 3B, afib, T2DM who is admitted with weight gain and increasing peripheral edema c/w CHF exacerbation.    PT is AOx4, on room air with cpap to sleep. Had a BM and uses bedside urinal. Assist of 1 to walk, stands favio. Diet is 2G, 2L and is ACHS  PT had low K+ (3.1) this morning and K+ was increased to 80 mEq x3  Running a continuous bumex drip @ 2mg/8ml hr  Driveline dressing changed, moderate drainage which has been usual.  VS: Blood pressure 90/75, pulse 80, temperature 97.6  F (36.4  C), temperature source Oral, resp. rate 16, height 1.702 m (5' 7\"), weight 92.4 kg (203 lb 12.8 oz), SpO2 94 %.     Continue to monitor and follow POC. Plan for continued diuresis. Notify CARDS 2 with changes.                              "

## 2022-09-07 NOTE — PROGRESS NOTES
CLINICAL NUTRITION SERVICES    Reason for Assessment:  Low-sodium (2 g/day) nutrition education    Diet History:  Pt reports he has received low-sodium nutrition education in the past. Reviewed low-sodium nutrition education briefly with pt, pt declining full education d/t hx of prior education, and wife being a  that is familiar with following low sodium diet and cooking principles.      Nutrition Diagnosis:  No nutrition diagnosis at this time.     Nutrition Prescription/Recs:  Continue low-sodium diet.      Interventions:  Nutrition Education  1. Provided brief verbal instruction on low-sodium meal planning.  2. Provided the following handouts: Tips for a Low-Sodium Diet, Seasoning Your Foods Without Adding Salt.    Goals:    Pt will verbalize at least five high sodium foods and the importance of avoiding added salt to foods for cooking or seasoning foods.     Follow-up:   RD will continue to follow per protocol.   Patient to ask any further nutrition-related questions before discharge. In addition, pt may request outpatient RD appointment.    Everardo Lynne RD, LD  6C RD pager: [phone]  Weekend/Holiday RD pager: [phone]

## 2022-09-07 NOTE — PHARMACY-ADMISSION MEDICATION HISTORY
Admission Medication History Completed by Pharmacy    See [facility] Admission Navigator for allergy information, preferred outpatient pharmacy, prior to admission medications and immunization status.     Medication History Sources:     Dispense report    Patient report    Changes made to PTA medication list (reason):    Added:   o dapagliflozin 5 mg tab  o Levothyroxine 50 mcg tab    Deleted: None    Changed:   o Bumetanide: 1 mg tab --> 4 mg tab  o Hydrochlorothiazide: 50 mg tab x1 tab BIW --> 25 mg tab x2 tabs BIW  o KCl 20 mEq ER tab sig: 3 tabs BID --> 3 tabs TID on Wed & Sat, 3 tabs BID on all other days  o Warfarin:  4 mg tab --> 2 mg tab; sig update    Additional Information:    Insulin:  o Brand: Basaglar  o Patient reported he stopped using the insulin pen after started on Farxiga last week.  o Regimen (before he stopped): 10 U subcutaneously at bedtime, not on sliding scale  o Last dose: last week    Warfarin:  o Strength of tablet: 2 mg  o Regimen: 4 mg PO on Sundays and Wednesdays, 2 mg on all other days  o Last dose: 9/1/22  o Diagnosis: LVAD  o Goal INR: 1.8 - 2.1  o Who manages: [facility]    Levothyroxine: patient have been taking levothyroxine 50 mcg tab PO every day at home. According to MAR, this medication was discontinued on 9/3 after Eliseo being hospitalized, and a new prescription of levothyroxine 75 mcg was started on the same day. His last dose of levothyroxine 75 mcg was taken this morning (9/6/22 @ 0810).    KCl: Patient reported taking 3 more KCl 20 mEq tablets on Wed and Sat, together with his hydrochlorothiazide tablets for potassium supplementation.     Prior to Admission medications    Medication Sig Last Dose Taking? Auth Provider Long Term End Date   allopurinol (ZYLOPRIM) 100 MG tablet 2 tablets (200 mg) by Oral or Feeding Tube route daily 9/6/2022 at am Yes Mono Gambino PA-C     amiodarone (PACERONE) 200 MG tablet TAKE 1 TABLET BY MOUTH ONCE DAILY  9/6/2022 at am Yes Mervat Decker PA-C Yes    amLODIPine (NORVASC) 5 MG tablet Take 2 tablets (10 mg) by mouth daily 9/6/2022 at am Yes Mervat Decker PA-C Yes    aspirin (ASA) 81 MG EC tablet Take 1 tablet (81 mg) by mouth daily 9/6/2022 at am Yes Nader Cisneros MD     atorvastatin (LIPITOR) 20 MG tablet Take 1 tablet (20 mg) by mouth daily 9/6/2022 at am Yes Nader Cisneros MD Yes    cefadroxil (DURICEF) 500 MG capsule Take 1 capsule (500 mg) by mouth 2 times daily 9/6/2022 at -- Yes Negar Bhatti DO     co-enzyme Q-10 200 MG CAPS 200 mg by Oral or Feeding Tube route daily 9/6/2022 at -- Yes Mono Gambino PA-C No    dapagliflozin (FARXIGA) 5 MG TABS tablet Take 5 mg by mouth daily 9/2/2022 at am Yes Unknown, Entered By History     finasteride (PROSCAR) 5 MG tablet Take 1 tablet (5 mg) by mouth daily Helps with urinary retention. 9/6/2022 at am Yes Jess Meraz MD     FLUoxetine (PROZAC) 10 MG capsule Take 30 mg by mouth daily 9/6/2022 at am Yes Unknown, Entered By History No    hydrALAZINE (APRESOLINE) 100 MG tablet Take 1 tablet (100 mg) by mouth 3 times daily 9/6/2022 at am Yes Nader Cisneros MD Yes    insulin glargine (BASAGLAR KWIKPEN) 100 UNIT/ML pen Inject 10 Units Subcutaneous At Bedtime  Past Month at -- Yes Unknown, Entered By History No    insulin pen needle (32G X 4 MM) 32G X 4 MM miscellaneous  Unknown at -- Yes Reported, Patient     levothyroxine (SYNTHROID/LEVOTHROID) 50 MCG tablet Take 50 mcg by mouth daily 9/2/2022 at am Yes Unknown, Entered By History     omeprazole (PRILOSEC) 20 MG DR capsule Take 20 mg by mouth daily 9/6/2022 at am Yes Unknown, Entered By History No    senna-docusate (SENOKOT-S/PERICOLACE) 8.6-50 MG tablet Take 1 tablet by mouth 2 times daily as needed for constipation  Past Week at -- Yes Unknown, Entered By History     B Complex-C-Folic Acid (RENAL) 1 MG CAPS Take 1 capsule by mouth daily 9/2/2022 at am  Nader Cisneros MD     bumetanide (BUMEX) 1 MG tablet  Take 4 tablets (4 mg) by mouth 2 times daily 9/2/2022 at am  Nader Cisneros MD Yes    hydrochlorothiazide (HYDRODIURIL) 50 MG tablet Take 50mg (1 tab) every Wednesday & Saturday's 9/3/2022 at --  Nader Cisneros MD Yes    isosorbide mononitrate (IMDUR) 30 MG 24 hr tablet Take 3 tablets (90 mg) by mouth daily 9/2/2022 at am  Nader Cisneros MD Yes    magnesium oxide (MAG-OX) 400 MG tablet 1 tablet (400 mg) by Oral or Feeding Tube route daily 9/2/2022 at am  Mono Gambino PA-C     nitroGLYcerin (NITROSTAT) 0.4 MG sublingual tablet For chest pain place 1 tablet under the tongue every 5 minutes for 3 doses. If symptoms persist 5 minutes after 1st dose call 911.  Patient not taking: Reported on 9/6/2022 Not Taking at Unknown time  Jess Meraz MD Yes    potassium chloride ER (KLOR-CON M) 20 MEQ CR tablet Take 3 tablets (60 mEq) by mouth 2 times daily  Patient taking differently: Take 60 mEq by mouth 2 times daily Takes 3 tablets (60 mEq) TID on Wednesdays and Saturdays together with hydrochlorothiazide; take 3 tablets (60 mEq) PO BID on all other days. 9/2/2022 at am  Nader Cisneros MD     tamsulosin (FLOMAX) 0.4 MG capsule Take 2 capsules (0.8 mg) by mouth daily This med helps with urinary retention 9/2/2022 at am  Nader Cisneros MD     warfarin ANTICOAGULANT (COUMADIN) 2 MG tablet Take by mouth daily Take 4 mg PO on Sundays and Wednesdays, and take 2 mg PO on all other days. 9/1/2022 at --  Unknown, Entered By History No    warfarin ANTICOAGULANT (COUMADIN) 4 MG tablet TAKE 1 TO 2 TABLETS (4 MG TO 8 MG) BY MOUTH ONCE DAILY AS DIRECTED  Patient not taking: Reported on 9/6/2022 Not Taking at Unknown time  Nader Cisneros MD No    zolpidem (AMBIEN) 5 MG tablet Take 5 mg by mouth nightly as needed for Insomnia 9/2/2022 at --  Reported, Patient         Date completed: 09/06/22    Medication history completed by: Loretta Gamboa, Pharmacy Intern

## 2022-09-07 NOTE — PLAN OF CARE
Neuro: A&Ox4. Slightly [place]  Cardiac: Paced. Bp stable. LVAD numbers with no alarm.    Respiratory: RA. Cpap at bedtime. LS clear.   GI/: Voiding good amount. Last bm 9/6  Diet/appetite: 2g Na, 2L FR, good appetite. Gluc AC and HS.  Activity: Up indep/SB.   Pain: Denies   Skin: No new deficits noted. LVAD dressing c/d/i  Lines: PIV x 1 infusing bumex.   Replacements: K and Mg replaced. Labs q 8 hours.

## 2022-09-07 NOTE — PLAN OF CARE
Temp: 97.4  F (36.3  C) Temp src: Oral BP: 90/81 Pulse: 59   Resp: 18 SpO2: 96 % O2 Device: None (Room air)       D: 67 year old male with a past medical history significant for systolic heart failure 2/2 NICM s/p LVAD (2/18/2020) c/b recurrent drive-line infections, T2DM, ABHINAV on CPAP, HTN, CKD stage III who is admitted for heart failure exacerbation.     I/A: Eliseo (he/him) is A/Ox4. Tele in place, V paced. VSS on RA. L PIV in place infusing bumex @ 2mg/hr. HM3 LVAD site and #s WDL. Potassium and mag replaced today. Up SBA.     P: Continue to monitor and follow POC. Notify Cards 2 with changes.     Marietta Richey RN

## 2022-09-07 NOTE — PROGRESS NOTES
[facility]    Cardiology Progress Note- Cardiology        Date of Admission:  9/2/2022     Assessment & Plan: SL   Eliseo Tanner is a 67 year old male admitted on 9/2/2022. He has a history of chronic systolic HF 2/2 NICM s/p LVAD 2/2020, CAD, CKD 3B, afib, T2DM who is admitted with weight gain and increasing peripheral edema c/w CHF exacerbation.    Changes today:  - continue bumex gtt @ 2 mg/hr  - Diuril IV 1g given at 2pm today, 2nd dose 1g scheduled for 8pm tonight (has prn condom cath order for sleep)  - repeat lytes check @ 6pm tonight, 10pm tonight  - increased KCl to 100 mEq TID + high replacement protocol  - lymphedema consulted today for leg wraps  - stricter fluid restriction from 2 L --> 1.5 L daily    Decompensated systolic heart failure secondary to NICM s/p HM3 LVAD as DT on 2/18/2020  Cardiorenal syndrome  Stage D. NYHA Class III.  P/w signs and sx hypervolemia, weight 218 lbs (dry weight ??200 lbs), ongoing edema, SOB with bibasilar crackles however no hypoxia. NT-BNP 6484 on admit, last 4416 in 2/2022. Started bumex gtt. Planned outpt cardiomems per Dr. Cisneros, RHC scheduled for 9/20.  Moderate MR on Echo 9/3/22 and PCWP 20 mmHg on last RHC 2/10/22 consistent with cardiorenal syndrome.  >>>  Fluid status: hypervolemic    Diuresis: holding PTA PO bumex and diuril    bumex loading dose 4 mg IV in ED    bumex gtt started on admission @ 0.5 mg/hr --> 1 mg/hr --> 2 mg/hr on 9/3    1x bumex 3 mg IV 9/3    2x diuril 500 mg IV 9/4    diuril 500 mg IV in AM + 1g IV in PM 9/5    Diuril 1g IV 9/6    Diuril 1g @ 2pm 9/7, 2nd dose 1g IV scheduled for 8pm 9/7    Lymphedema wraps lower extremities ordered 9/7  ACEi/ARB/ARNI: contraindicated due to renal dysfunction (none PTA), will consider resuming entresto prior to discharge if renal function continues to improve  BB: None due to history of shock.  Aldosterone antagonist: contraindicated due to renal  dysfunction (none PTA)  SCD prophylaxis: ICD  NSAID use: none  Sleep Apnea Evaluation: has ABHINAV, uses CPAP at home - continue inpatient  BP: amlodipine 10 mg daily, hydralazine 100 g 3 times daily, long-acting isosorbide 90 mg daily - cont as BPs allow  LDH trends: 290-310 for past year, last 253 5/2022, recheck on admission 273; will monitor q48 hrs  Anticoagulation: warfarin INR goal 1.7-2.3, pharmacy dosing  Antiplatelet: ASA 81 mg daily - continue   RV support: none  Lytes:    q8h bmp/mg/phos (0500, 1300, 2100) with high replacement protocols    Increase to 100 mEq K TID on 9/7    scheduled Mag 400 mg daily at admission, discontinued 9/4, will replace per protocol  LVAD: HeartMate 3    Flow 5.0 LPM    PI 3.0    Speed 5500 rpm --> increased to 5600 on 9/3 --> 5700 on 9/4 --> 5800 on 9/5  Echo LVAD 9/3/22:    LV EF 10-15%, LVEDd 6.0 cm (compared to 5.5 cm 2/10/2022)    RV overload, moderate dilation, moderately reduced function    mild-moderate mitral insufficiency    mild aortic insufficiency    IVC dilated >2.1 cm c/w high RA pressure ?15 mmHg    Hyponatremia, mild  Na 133 on admission, fluctuating 133-136 for past 1 month.  - trend BMP while diuresing as above    Chronic MSSA drive line infection  Baseline leakage from LVAD site, no fevers, does not meet SIRS criteria.  - cont chronic suppressive therapy with cefadroxil 500 mg BID    CKD, stage 3B  Cr 2, recent baseline 1.8-2.1 per chart review. Has been steadily trending up over past few months. Follows with nephrology.  - trend while diuresing  - avoid nephrotoxins  - cont PTA nephrocaps    Hx of Afib w/ RVR and aberrancy vs NATHALIA vs AT vs slow VT  S/p DCCV on 2/28/21 back into sinus rhythm. Sinus bradycardia with increased RV pacing in 2/2022 so increased lower pacer rate from 40 to 60 and turned on rate response.  Most recent device interrogation (5/3/22) AF burden 0%.  - continue amiodarone 200 mg daily  - AC with warfarin as above    CAD  Mild to moderate  CAD with severe branch disease per [facility] 11/2019.  - cont ASA, statin    Hypothyroidism  Last TSH 10.5 on 8/3/22. On amiodarone as above  - recheck TSH 8.06 on 9/3  - PTA levothyroxine 50 mcg daily --> increased to 75 mcg 9/3  - will need recheck TSH in 4 weeks as outpatient (~10/5/22)    DM II  A1c 6 on 8/25/22.  Previously on 10 units glargine, appears this may have been recently discontinued and dapagliflozin started by PCP.  - hold dapagliflozin  - LDSSI    Insomnia   Reports he cannot sleep in hospital without pta ambien. States he has tried trazodone and melatonin without any success.  - cont PTA ambien, low threshold to hold if any concerns for delirium    BPH, urinary retention  - cont PTA finasteride, tamsulosin    GERD  - cont PTA PPI    Gout  - cont PTA allopurinol    IgG Kappa monoclonal gammopathy of undetermined significance  - Followed by heme/onc outpt    Diet: Cardiac + fluid restrict 1500 mL  DVT ppx: AC with Warfarin  Guevara: no   Code: Full Code    The patient's care was discussed with the Attending Physician, Dr. Salmeron.    Heather Hall MD  PGY-1 Internal Medicine  Cards 2 Service  ______________________________________________________________________    Interval History   NAOE. Feeling well this morning. No chest pressure/heaviness, no SOB, having daily bowel movement. Up to chair and walking around on floor.    Data reviewed today: I reviewed all medications, new labs and imaging results over the last 24 hours.    Physical Exam   Vital Signs: Temp: 98  F (36.7  C) Temp src: Oral BP: (!) 84/77 Pulse: 61   Resp: 18 SpO2: 95 % O2 Device: None (Room air)    Weight: 200 lbs 14.4 oz     Temp:  [97.6  F (36.4  C)-98.1  F (36.7  C)] 98  F (36.7  C)  Pulse:  [60-80] 61  Resp:  [16-18] 18  BP: ()/(54-93) 84/77  SpO2:  [94 %-96 %] 95 %    I/O last 3 completed shifts:  In: 2370.13 [P.O.:2250; I.V.:120.13]  Out: 6380 [Urine:6380]    Gen: awake, alert, NAD  Resp: No increased work of breathing on room  air, no accessory muscle use  Cardio: appreciate LVAD, BLE 2+ pitting edema to knees, JVD to angle of jaw while sitting upright  GI: distended but soft, nontender  Psych: cooperative, mood and affect congruent    Data   Recent Labs   Lab 09/07/22  0644 09/07/22  0535 09/06/22  2129 09/06/22  1711 09/06/22  1328 09/06/22  0701 09/06/22  0633 09/05/22  1251 09/05/22  0618   WBC  --  6.7  --   --   --   --  7.0  --  7.0   HGB  --  12.9*  --   --   --   --  13.4  --  13.3   MCV  --  92  --   --   --   --  91  --  93   PLT  --  232  --   --   --   --  252  --  243   INR  --  2.73*  --   --   --   --  2.39*  --  1.83*   NA  --  141 140  --  141  --  138   < > 140   POTASSIUM  --  3.1* 4.8  --  3.5  --  3.1*   < > 3.4   CHLORIDE  --  96* 100  --  99  --  96*   < > 101   CO2  --  30* 28  --  28  --  29   < > 26   BUN  --  50.9* 51.0*  --  47.0*  --  45.5*   < > 42.8*   CR  --  1.76* 1.99*  --  1.86*  --  1.87*   < > 1.78*   ANIONGAP  --  15 12  --  14  --  13   < > 13   CALOS  --  8.8 9.1  --  9.2  --  9.0   < > 8.8   * 124* 155*   < > 196*   < > 160*   < > 121*    < > = values in this interval not displayed.     No results found for this or any previous visit (from the past 24 hour(s)).  Medications     bumetanide 2 mg/hr (09/07/22 0320)     - MEDICATION INSTRUCTIONS -       ACE/ARB/ARNI NOT PRESCRIBED       BETA BLOCKER NOT PRESCRIBED         allopurinol  200 mg Oral or Feeding Tube Daily     amiodarone  200 mg Oral Daily     amLODIPine  10 mg Oral Daily     aspirin  81 mg Oral Daily     atorvastatin  20 mg Oral Daily     cefadroxil  500 mg Oral BID     finasteride  5 mg Oral Daily     FLUoxetine  30 mg Oral Daily     hydrALAZINE  100 mg Oral TID     insulin aspart  1-3 Units Subcutaneous TID AC     insulin aspart  1-3 Units Subcutaneous At Bedtime     isosorbide mononitrate  90 mg Oral Daily     levothyroxine  75 mcg Oral QAM AC     magnesium oxide  400 mg Oral Q4H     multivitamin RENAL  1 capsule Oral Daily      pantoprazole  40 mg Oral QAM AC     potassium chloride  80 mEq Oral TID     potassium chloride  40 mEq Oral Once     senna-docusate  1 tablet Oral BID     tamsulosin  0.8 mg Oral Daily     Warfarin Therapy Reminder  1 each Oral See Admin Instructions

## 2022-09-07 NOTE — PLAN OF CARE
Admitted with weight gain and increasing peripheral edema c/w CHF exacerbation. PMHx of chronic systolic HF 2/2 NICM s/p LVAD 2/2020, CAD, CKD 3B, afib, T2DM    Neuro: AOx4, Calm and cooperative, [place] on the left side. No neurological deficits  Cardiac/Tele: V paced rhythm, LVAD HM 3 numbers WNL. No alarms. MAPs WNL, afebrile. Pt denies CP or palpitations. Other VSS  Respiratory: Room air, denies SOB but slight MIRANDA. LS clear and eqaul bilaterally.   GI/: Voids without difficulty. Uses the urinal. LBM (9/6). Bowel sounds audible and normoactive  Diet/Appetite: 2g Na diet with 2L FR. Pt aware of FR  Skin: Bruising on the Forearms, Nico BLE and 3+ edema to BLE. LVAD driveline site intact (CDI). Dressing changes daily  Endocrine: BG checks ACHS  LDAs: PIV infusing Bumex at 2mg/hr  Activity: Up SBA  Pain: Denies pain     Plan: Continue POC per team. Notify team of any concerns

## 2022-09-08 NOTE — PLAN OF CARE
Temp: 98.1  F (36.7  C) Temp src: Axillary BP: 100/88 Pulse: 61   Resp: 16 SpO2: 93 % O2 Device: BiPAP/CPAP       D: 67 year old male with a past medical history significant for systolic heart failure 2/2 NICM s/p LVAD (2/18/2020) c/b recurrent drive-line infections, T2DM, ABHINAV on CPAP, HTN, CKD stage III who is admitted for heart failure exacerbation.      I/A: Eliseo (he/him) is A/Ox4. Tele in place, V paced. VSS on RA. Complaints of cramping in feet and ankles later this afternoon. Pt offered lymph wrap holiday but declined. L PIV in place infusing bumex @ 2mg/hr. HM3 LVAD #s WDL. Potassium replaced today. Diuril given x1. Lymphedema wraps placed. Up SBA.      P: Continue to monitor and follow POC. Notify Cards 2 with changes.      Marietta Richey RN

## 2022-09-08 NOTE — PLAN OF CARE
Admitted with weight gain and increasing peripheral edema c/w CHF exacerbation. PMHx of chronic systolic HF 2/2 NICM s/p LVAD 2/2020, CAD, CKD 3B, afib, T2DM     Neuro: AOx4, Calm and cooperative, [place] on the left side. No neurological deficits  Cardiac/Tele: V paced rhythm, LVAD HM 3 numbers WNL. No alarms. MAPs WNL, afebrile. Pt denies CP or palpitations. Other VSS  Respiratory: Room air, denies SOB but slight MIRANDA. LS clear and eqaul bilaterally.   GI/: Voids without difficulty. Uses the urinal. LBM (9/6). Bowel sounds audible and normoactive  Diet/Appetite: 2g Na diet with 2L FR.  Skin: Bruising on the Forearms, Nico BLE and 3+ edema to BLE. LVAD driveline site intact (CDI). Dressing changes daily  Endocrine: BG checks ACHS  LDAs: PIV infusing Bumex at 2mg/hr  Activity: Up SBA  Pain: Denies pain     Plan: Continue POC per team. Notify team of any concerns

## 2022-09-08 NOTE — PROGRESS NOTES
[facility]    Cardiology Progress Note- Cardiology        Date of Admission:  9/2/2022     Assessment & Plan: SL   Eliseo Tanner is a 67 year old male admitted on 9/2/2022. He has a history of chronic systolic HF 2/2 NICM s/p LVAD 2/2020, CAD, CKD 3B, afib, T2DM who is admitted with weight gain and increasing peripheral edema c/w CHF exacerbation.    Changes today:  - continue bumex gtt @ 2 mg/hr  - Diuril IV 1g given at 2pm today, 2nd dose 500mg scheduled for 7pm tonight (has prn condom cath order for sleep)  - repeat lytes check @ 3pm today, 9pm tonight  - + high replacement protocol + holding  mEq K given increasing K on electrolyte checks  - repeat echo today - pending    Decompensated systolic heart failure secondary to NICM s/p HM3 LVAD as DT on 2/18/2020  Cardiorenal syndrome  Stage D. NYHA Class III.  P/w signs and sx hypervolemia, weight 218 lbs (dry weight ??200 lbs), ongoing edema, SOB with bibasilar crackles however no hypoxia. NT-BNP 6484 on admit, last 4416 in 2/2022. Started bumex gtt. Planned outpt cardiomems per Dr. Cisneros, RHC scheduled for 9/20.  Moderate MR on Echo 9/3/22 and PCWP 20 mmHg on last RHC 2/10/22 consistent with cardiorenal syndrome.  >>>  Fluid status: hypervolemic    Diuresis: holding PTA PO bumex and diuril    bumex loading dose 4 mg IV in ED    bumex gtt started on admission @ 0.5 mg/hr --> 1 mg/hr --> 2 mg/hr on 9/3    1x bumex 3 mg IV 9/3    2x diuril 500 mg IV 9/4    diuril 500 mg IV in AM + 1g IV in PM 9/5    Diuril 1g IV 9/6    Diuril 1g @ 2pm 9/7    Diuril 1g @ 1pm 9/8, plan for 500 mg @ 7pm    Lymphedema wraps lower extremities ordered 9/7  ACEi/ARB/ARNI: contraindicated due to renal dysfunction (none PTA), will consider resuming entresto prior to discharge if renal function continues to improve  BB: None due to history of shock.  Aldosterone antagonist: contraindicated due to renal dysfunction (none PTA)  SCD  prophylaxis: ICD  NSAID use: none  Sleep Apnea Evaluation: has ABHINAV, uses CPAP at home - continue inpatient  BP: amlodipine 10 mg daily, hydralazine 100 g 3 times daily, long-acting isosorbide 90 mg daily - cont as BPs allow  LDH trends: 290-310 for past year, last 253 5/2022, recheck on admission 273; will monitor q48 hrs  Anticoagulation: warfarin INR goal 1.7-2.3, pharmacy dosing  Antiplatelet: ASA 81 mg daily - continue   RV support: none  Lytes:    q8h bmp/mg/phos (0500, 1300, 2100) with high replacement protocols    Increase to 100 mEq K TID on 9/7    scheduled Mag 400 mg daily at admission, discontinued 9/4, will replace per protocol  LVAD: HeartMate 3    Flow 5.3 LPM    PI 2.9    Speed 5500 rpm --> increased to 5600 on 9/3 --> 5700 on 9/4 --> 5800 on 9/5  Echo LVAD 9/3/22:    LV EF 10-15%, LVEDd 6.0 cm (compared to 5.5 cm 2/10/2022)    RV overload, moderate dilation, moderately reduced function    mild-moderate mitral insufficiency    mild aortic insufficiency    IVC dilated >2.1 cm c/w high RA pressure ?15 mmHg  Repeat echo lvad 9/8 - pending    Hyponatremia, mild  Na 133 on admission, fluctuating 133-136 for past 1 month.  - trend BMP while diuresing as above    Chronic MSSA drive line infection  Baseline leakage from LVAD site, no fevers, does not meet SIRS criteria.  - cont chronic suppressive therapy with cefadroxil 500 mg BID    CKD, stage 3B  Cr 2, recent baseline 1.8-2.1 per chart review. Has been steadily trending up over past few months. Follows with nephrology.  - trend while diuresing  - avoid nephrotoxins  - cont PTA nephrocaps    Hx of Afib w/ RVR and aberrancy vs half-way vs AT vs slow VT  S/p DCCV on 2/28/21 back into sinus rhythm. Sinus bradycardia with increased RV pacing in 2/2022 so increased lower pacer rate from 40 to 60 and turned on rate response.  Most recent device interrogation (5/3/22) AF burden 0%.  - continue amiodarone 200 mg daily  - AC with warfarin as above    CAD  Mild to  moderate CAD with severe branch disease per [facility] 11/2019.  - cont ASA, statin    Hypothyroidism  Last TSH 10.5 on 8/3/22. On amiodarone as above  - recheck TSH 8.06 on 9/3  - PTA levothyroxine 50 mcg daily --> increased to 75 mcg 9/3  - will need recheck TSH in 4 weeks as outpatient (~10/5/22)    DM II  A1c 6 on 8/25/22.  Previously on 10 units glargine, appears this may have been recently discontinued and dapagliflozin started by PCP.  - hold dapagliflozin  - LDSSI    Insomnia   Reports he cannot sleep in hospital without pta ambien. States he has tried trazodone and melatonin without any success.  - cont PTA ambien, low threshold to hold if any concerns for delirium    BPH, urinary retention  - cont PTA finasteride, tamsulosin    GERD  - cont PTA PPI    Gout  - cont PTA allopurinol    IgG Kappa monoclonal gammopathy of undetermined significance  - Followed by heme/onc outpt    Diet: Cardiac + fluid restrict 1500 mL  DVT ppx: AC with Warfarin  Guevara: no   Code: Full Code    The patient's care was discussed with the Attending Physician, Dr. Salmeron.    Heather Hall MD  PGY-1 Internal Medicine  Cards 2 Service  ______________________________________________________________________    Interval History   NAOE. Feeling well this morning. No chest pressure/pain, SOB, or abdominal pain. Continues to make a lot of urine. Legs wrapped when went back during rounds this morning.    Data reviewed today: I reviewed all medications, new labs and imaging results over the last 24 hours.    Physical Exam   Vital Signs: Temp: 97.7  F (36.5  C) Temp src: Axillary BP: 98/81 Pulse: 59   Resp: 18 SpO2: 96 % O2 Device: BiPAP/CPAP    Weight: 194 lbs 0 oz     Temp:  [97.4  F (36.3  C)-98.2  F (36.8  C)] 97.7  F (36.5  C)  Pulse:  [59-60] 59  Resp:  [18] 18  BP: ()/(68-87) 98/81  SpO2:  [94 %-96 %] 96 %    I/O last 3 completed shifts:  In: 1510 [P.O.:1510]  Out: 5675 [Urine:5675]    Gen: awake, alert, NAD  Resp: No increased work of  breathing on room air, no accessory muscle use  Cardio: appreciate LVAD, BLE 2+ pitting edema to knees, lesa of JVD to mid-neck while sitting upright  GI: distended but soft, nontender  Psych: cooperative, mood and affect congruent    Data   Recent Labs   Lab 09/08/22  1127 09/08/22  0939 09/08/22  0636 09/08/22  0543 09/07/22  2232 09/07/22  2104 09/07/22  0644 09/07/22  0535 09/06/22  0701 09/06/22  0633   WBC  --   --   --  6.7  --   --   --  6.7  --  7.0   HGB  --   --   --  13.0*  --   --   --  12.9*  --  13.4   MCV  --   --   --  92  --   --   --  92  --  91   PLT  --   --   --  231  --   --   --  232  --  252   INR  --   --   --  2.57*  --   --   --  2.73*  --  2.39*   NA  --  140  --  139  --  139   < > 141   < > 138   POTASSIUM  --  4.8  --  3.0*  --  3.6   < > 3.1*   < > 3.1*   CHLORIDE  --  95*  --  94*  --  96*   < > 96*   < > 96*   CO2  --  32*  --  32*  --  30*   < > 30*   < > 29   BUN  --  51.3*  --  49.4*  --  52.7*   < > 50.9*   < > 45.5*   CR  --  1.76*  --  1.68*  --  1.84*   < > 1.76*   < > 1.87*   ANIONGAP  --  13  --  13  --  13   < > 15   < > 13   CALOS  --  9.3  --  8.8  --  9.1   < > 8.8   < > 9.0   * 194* 102* 107*   < > 156*   < > 124*   < > 160*    < > = values in this interval not displayed.     No results found for this or any previous visit (from the past 24 hour(s)).  Medications     bumetanide 2 mg/hr (09/08/22 0416)     - MEDICATION INSTRUCTIONS -       ACE/ARB/ARNI NOT PRESCRIBED       BETA BLOCKER NOT PRESCRIBED         allopurinol  200 mg Oral or Feeding Tube Daily     amiodarone  200 mg Oral Daily     amLODIPine  10 mg Oral Daily     aspirin  81 mg Oral Daily     atorvastatin  20 mg Oral Daily     cefadroxil  500 mg Oral BID     finasteride  5 mg Oral Daily     FLUoxetine  30 mg Oral Daily     hydrALAZINE  100 mg Oral TID     insulin aspart  1-3 Units Subcutaneous TID AC     insulin aspart  1-3 Units Subcutaneous At Bedtime     isosorbide mononitrate  90 mg Oral Daily      levothyroxine  75 mcg Oral QAM AC     multivitamin RENAL  1 capsule Oral Daily     pantoprazole  40 mg Oral QAM AC     potassium chloride  100 mEq Oral TID     potassium chloride  20 mEq Oral Once     potassium chloride  40 mEq Oral Once     senna-docusate  1 tablet Oral BID     tamsulosin  0.8 mg Oral Daily     Warfarin Therapy Reminder  1 each Oral See Admin Instructions

## 2022-09-08 NOTE — PROGRESS NOTES
09/08/22 0845   Quick Adds   Quick Adds Edema Eval   Edema General Information   Onset of Edema   (acute on chronic)   Affected Body Part(s) Right LE;Left LE   Etiology Comments hypervolemia, HF   Edema Precautions Cardiac Edema/CHF   General Comments/Previous Edema Treatment/Edema Equipment Pt reports LE edema started a few months ago, increased significantly ~1 week prior to hospital admission. Attempted to wear compression socks the week prior to hospital admission to reduce LE edema.   Edema Examination/Assessment   Skin Condition Pitting;Dryness   Skin Condition Comments Pt has 2+ pitting edema in BLEs, most prominent in ankles/dorsum of feet bilaterally. Significant dryness/calous on ventral surface of feet/toes including skin flaking. Toenails thickened and discolored. Nico skin appearance from ankles<>knee crease. Healed scar on L anterior shin from prior tibia/fibular fracture. Healed scars on B knees from prior TKAs.   Scar Yes   Ulcerations No   Radial Pulse Symmetrical   Dorsal Pedal Pulse Symmetrical   Stemmer Sign Positive   Clinical Impression   Edema: Patient Presentation Stage 1 Lymphedema   Edema: Planned Interventions Gradient compression bandaging;Fit for compression garment;Edema exercises;Precautions to prevent infection/exacerbation;Education;Manual therapy   Total Evaluation Time   Total Evaluation Time (Minutes) 4   Physical Therapy Goals   PT: Edema education to increase ability to manage edema after discharge from the hospital Patient;Caregiver;Shafter;Skin care routine;signs/symptoms of intolerance;wear schedule;limb positioning;garment/bandage care;discharge recommendations   PT: Management of edema bandages Patient;Demonstrate;Shafter;garment(s)

## 2022-09-09 NOTE — PLAN OF CARE
Admitted with weight gain and increasing peripheral edema c/w CHF exacerbation. PMHx of chronic systolic HF 2/2 NICM s/p LVAD 2/2020, CAD, CKD 3B, afib, T2DM     Neuro: AOx4, Calm and cooperative, [place] on the left side. No neurological deficits  Cardiac/Tele: V paced rhythm, Occasional low PI's but no alarms. MAPs WNL, afebrile. Pt denies CP or palpitations. Other VSS  Respiratory: Room air, denies SOB but slight MIRANDA. LS clear and eqaul bilaterally.   GI/: Voids without difficulty, adequate UOP. Uses the urinal. LBM (9/6). Bowel sounds audible and normoactive  Diet/Appetite: 2g Na diet with 2L FR.  Skin: Bruising on the Forearms, Nico and 3+ edema to BLE (Lymph wraps on). LVAD driveline site intact (CDI). Dressing changes daily.  Endocrine: BG checks ACHS  LDAs: PIV infusing Bumex at 2mg/hr  Activity: Up SBA  Pain: Denies pain    Plan: Continue POC per team. Notify team of any concerns

## 2022-09-09 NOTE — PROGRESS NOTES
CLINICAL NUTRITION SERVICES    Reviewed nutrition risk factors due to LOS. Pt is tolerating diet, eating well per nursing documentation (100% meals per RN flowsheets). Wt is down this admit (218 lbs on 9/2-->183 lbs on 9/9), but he was admitted for refractory hypervolemia and is undergoing diuresis. Pt shares his wife is a retired EMT and now works in hospital dietary and is very well versed in the diet he needs. He takes a renal multivitamin. No nutrition issues identified at this time. RD will follow via rounds at this time, unless consulted.    Kavitha Isabel RD, LD  Pager: [phone]

## 2022-09-09 NOTE — PROGRESS NOTES
[facility]    Cardiology Progress Note- Cardiology        Date of Admission:  9/2/2022     Assessment & Plan: SL   Eliseo Tanner is a 67 year old male admitted on 9/2/2022. He has a history of chronic systolic HF 2/2 NICM s/p LVAD 2/2020, CAD, CKD 3B, afib, T2DM who is admitted with weight gain and increasing peripheral edema c/w CHF exacerbation.    Changes today:  - continue bumex gtt @ 2 mg/hr  - Diuril  mg this afternoon  - scheduled KCl 80 mEq TID + high replacement protocol  - q8h BMP/lytes    Decompensated systolic heart failure secondary to NICM s/p HM3 LVAD as DT on 2/18/2020  Cardiorenal syndrome  Stage D. NYHA Class III.  P/w signs and sx hypervolemia, weight 218 lbs (dry weight ??200 lbs), ongoing edema, SOB with bibasilar crackles however no hypoxia. NT-BNP 6484 on admit, last 4416 in 2/2022. Started bumex gtt. Planned outpt cardiomems per Dr. Cisneros, RHC scheduled for 9/20.  Moderate MR on Echo 9/3/22 and PCWP 20 mmHg on last RHC 2/10/22 consistent with cardiorenal syndrome.  >>>  Fluid status: hypervolemic    Diuresis: holding PTA PO bumex and diuril    bumex loading dose 4 mg IV in ED    bumex gtt started on admission @ 0.5 mg/hr --> 1 mg/hr --> 2 mg/hr on 9/3    1x bumex 3 mg IV 9/3    2x diuril 500 mg IV 9/4    diuril 500 mg IV in AM + 1g IV in PM 9/5    Diuril 1g IV 9/6    Diuril 1g @ 2pm 9/7    Diuril 1g @ 1pm 9/8, 500 mg @ 7pm    Diuril 500 mg 9/9    Lymphedema wraps lower extremities ordered 9/7  ACEi/ARB/ARNI: contraindicated due to renal dysfunction (none PTA), will consider resuming entresto prior to discharge if renal function continues to improve  BB: None due to history of shock.  Aldosterone antagonist: contraindicated due to renal dysfunction (none PTA)  SCD prophylaxis: ICD  NSAID use: none  Sleep Apnea Evaluation: has ABHINAV, uses CPAP at home - continue inpatient  BP: amlodipine 10 mg daily, hydralazine 100 g 3 times daily,  long-acting isosorbide 90 mg daily - cont as BPs allow  LDH trends: 290-310 for past year, last 253 5/2022, recheck on admission 273; will monitor q48 hrs  Anticoagulation: warfarin INR goal 1.7-2.3, pharmacy dosing  Antiplatelet: ASA 81 mg daily - continue   RV support: none  Lytes:    q8h bmp/mg/phos (0500, 1300, 2100) with high replacement protocols    Increase to 100 mEq K TID on 9/7    scheduled Mag 400 mg daily at admission, discontinued 9/4, will replace per protocol  LVAD: HeartMate 3    Flow 5.3 LPM    PI 2.9    Speed 5500 rpm --> increased to 5600 on 9/3 --> 5700 on 9/4 --> 5800 on 9/5  Echo LVAD 9/3/22:    LV EF 10-15%, LVEDd 6.0 cm (compared to 5.5 cm 2/10/2022)    RV overload, moderate dilation, moderately reduced function    mild-moderate mitral insufficiency    mild aortic insufficiency    IVC dilated >2.1 cm c/w high RA pressure ?15 mmHg  Repeat echo lvad 9/8:    Left ventricular function is decreased. The ejection fraction is 15-20% (severely reduced).    Global right ventricular function is moderately reduced.    Mild to moderate mitral insufficiency is present.    Moderate tricuspid insufficiency is present.    No pericardial effusion is present.    Hyponatremia, mild  Na 133 on admission, fluctuating 133-136 for past 1 month.  - trend BMP while diuresing as above    Chronic MSSA drive line infection  Baseline leakage from LVAD site, no fevers, does not meet SIRS criteria.  - cont chronic suppressive therapy with cefadroxil 500 mg BID    CKD, stage 3B  Cr 2, recent baseline 1.8-2.1 per chart review. Has been steadily trending up over past few months. Follows with nephrology.  - trend while diuresing  - avoid nephrotoxins  - cont PTA nephrocaps    Hx of Afib w/ RVR and aberrancy vs half-way vs AT vs slow VT  S/p DCCV on 2/28/21 back into sinus rhythm. Sinus bradycardia with increased RV pacing in 2/2022 so increased lower pacer rate from 40 to 60 and turned on rate response.  Most recent device  interrogation (5/3/22) AF burden 0%.  - continue amiodarone 200 mg daily  - AC with warfarin as above    CAD  Mild to moderate CAD with severe branch disease per [facility] 11/2019.  - cont ASA, statin    Hypothyroidism  Last TSH 10.5 on 8/3/22. On amiodarone as above  - recheck TSH 8.06 on 9/3  - PTA levothyroxine 50 mcg daily --> increased to 75 mcg 9/3  - will need recheck TSH in 4 weeks as outpatient (~10/5/22)    DM II  A1c 6 on 8/25/22.  Previously on 10 units glargine, appears this may have been recently discontinued and dapagliflozin started by PCP.  - hold dapagliflozin  - LDSSI    Insomnia   Reports he cannot sleep in hospital without pta ambien. States he has tried trazodone and melatonin without any success.  - cont PTA ambien, low threshold to hold if any concerns for delirium    BPH, urinary retention  - cont PTA finasteride, tamsulosin    GERD  - cont PTA PPI    Gout  - cont PTA allopurinol    IgG Kappa monoclonal gammopathy of undetermined significance  - Followed by heme/onc outpt    Diet: Cardiac + fluid restrict 1500 mL  DVT ppx: AC with Warfarin  Guevara: no   Code: Full Code    The patient's care was discussed with the Attending Physician, Dr. Dutton.    Heather Hall MD  PGY-1 Internal Medicine  Cards 2 Service  ______________________________________________________________________    Interval History   NAOE. Doing well this morning, no chest pain/pressure, SOB, or abdominal pain.    Data reviewed today: I reviewed all medications, new labs and imaging results over the last 24 hours.    Physical Exam   Vital Signs: Temp: 97.5  F (36.4  C) Temp src: Axillary BP: 99/82 Pulse: 60   Resp: 18 SpO2: 95 % O2 Device: None (Room air)    Weight: 183 lbs 11.2 oz     Temp:  [97.5  F (36.4  C)-98.4  F (36.9  C)] 97.5  F (36.4  C)  Pulse:  [59-64] 60  Resp:  [16-18] 18  BP: ()/(76-88) 99/82  SpO2:  [93 %-96 %] 95 %    I/O last 3 completed shifts:  In: 1900 [P.O.:1900]  Out: 7300 [Urine:7300]    Gen: awake,  alert, NAD  Resp: No increased work of breathing on room air, no accessory muscle use  Cardio: appreciate LVAD, BLE 1+ pitting edema to knees,JVD mid-neck sitting upright  GI: distended but soft, nontender  Psych: cooperative, mood and affect congruent    Data   Recent Labs   Lab 22  1643 22  1403 22  1148 22  0645 22  0422 22  2342 22  1648 22  1454 22  0636 22  0543 22  0644 22  0535   WBC  --   --   --   --  6.9  --   --   --   --  6.7  --  6.7   HGB  --   --   --   --  13.8  --   --   --   --  13.0*  --  12.9*   MCV  --   --   --   --  91  --   --   --   --  92  --  92   PLT  --   --   --   --  234  --   --   --   --  231  --  232   INR  --   --   --   --  2.22*  --   --   --   --  2.57*  --  2.73*   NA  --   --   --   --  141 139  --  138   < > 139   < > 141   POTASSIUM  --  4.3  --   --  2.9* 3.3*  --  5.3  5.3   < > 3.0*   < > 3.1*   CHLORIDE  --   --   --   --  93* 93*  --  97*   < > 94*   < > 96*   CO2  --   --   --   --  35* 35*  --  31*   < > 32*   < > 30*   BUN  --   --   --   --  49.2* 50.8*  --  52.5*   < > 49.4*   < > 50.9*   CR  --   --   --   --  1.70* 1.76*  --  1.74*   < > 1.68*   < > 1.76*   ANIONGAP  --   --   --   --  13 11  --  10   < > 13   < > 15   CALOS  --   --   --   --  9.3 9.1  --  8.8   < > 8.8   < > 8.8   *  --  110* 156* 89 109*   < > 180*   < > 107*   < > 124*    < > = values in this interval not displayed.     Recent Results (from the past 24 hour(s))   Echocardiogram Complete   Result Value    LVEF  15-20% (severely reduced)    [facility]    [id number]  [id number]  [id number]  [id number]^MARA^MAL^ANKUR     [facility],[place]  Echocardiography Laboratory  [address] [place] [id number]     Name: WOLFGANG LEACH  MRN: [id number]  : 1955  Study Date: 2022 12:17 PM  Age: 67 yrs  Gender: Male  Patient Location: [facility] [place]  Reason For Study: Mitral  Regurgitation  Ordering Physician: MAL TERRY  Performed By: Jie Fang     BSA: 2.0 m2  Height: 67 in  Weight: 194 lb  HR: 63  ______________________________________________________________________________  Procedure  LVAD Echocardiogram with two-dimensional, color and spectral Doppler  performed.  ______________________________________________________________________________  Interpretation Summary  HM 3 5800 RPM     Left ventricular function is decreased. The ejection fraction is 15-20%  (severely reduced).  Global right ventricular function is moderately reduced.  Mild to moderate mitral insufficiency is present.  Moderate tricuspid insufficiency is present.  No pericardial effusion is present.  ______________________________________________________________________________  Left Ventricle  Left ventricular function is decreased. The ejection fraction is 15-20%  (severely reduced). LVAD cannula was seen in the expected anatomic position in  the LV apex. Septum is deviated to the left.     Right Ventricle  Moderate right ventricular dilation is present. Global right ventricular  function is moderately reduced. A pacemaker lead is noted in the right  ventricle.     Mitral Valve  Mild to moderate mitral insufficiency is present.     Aortic Valve  The aortic valve partially opens and there is mild continuous aortic  regurgitation.     Tricuspid Valve  Moderate tricuspid insufficiency is present. The right ventricular systolic  pressure is approximated at 12.2 mmHg plus the right atrial pressure.     Pericardium  No pericardial effusion is present.  ______________________________________________________________________________  Doppler Measurements & Calculations  TR max saumya: 174.4 cm/sec  TR max P.2 mmHg     ______________________________________________________________________________  Report approved by: Graciela Caballero 2022 02:28 PM           Medications     bumetanide 2 mg/hr (22  0624)     - MEDICATION INSTRUCTIONS -       ACE/ARB/ARNI NOT PRESCRIBED       BETA BLOCKER NOT PRESCRIBED         allopurinol  200 mg Oral or Feeding Tube Daily     amiodarone  200 mg Oral Daily     amLODIPine  10 mg Oral Daily     aspirin  81 mg Oral Daily     atorvastatin  20 mg Oral Daily     cefadroxil  500 mg Oral BID     finasteride  5 mg Oral Daily     FLUoxetine  30 mg Oral Daily     hydrALAZINE  100 mg Oral TID     insulin aspart  1-3 Units Subcutaneous TID AC     insulin aspart  1-3 Units Subcutaneous At Bedtime     isosorbide mononitrate  90 mg Oral Daily     levothyroxine  75 mcg Oral QAM AC     multivitamin RENAL  1 capsule Oral Daily     pantoprazole  40 mg Oral QAM AC     potassium chloride  80 mEq Oral TID     [Held by provider] potassium chloride  20 mEq Oral Once     senna-docusate  1 tablet Oral BID     tamsulosin  0.8 mg Oral Daily     Warfarin Therapy Reminder  1 each Oral See Admin Instructions

## 2022-09-09 NOTE — PLAN OF CARE
D: Admitted 9/2 with weight gain and increasing peripheral edema c/w CHF exacerbation  Hx: chronic systolic HF 2/2 NICM s/p LVAD 2/2020, CAD, CKD 3B, afib, T2DM    I: Monitored vitals and assessed pt status.   Changed: Diuril x1, pt now on 1.5L FR  Running: Bumex @2mg/hr via PIV    A: A0x4. VSS, on RA. V-paced. Afebrile. Denies pain. 40mEq K+ given x2 in AM and then Cards 2 ordered 80mEq for noon, 1400 recheck was 4.3. LBM 9/9. Voiding adequately via urinal. 1.5L FR (changed from 2L FR). Daily LVAD dressing changed, scant purulent and serosanguinous drainage on dressing. Blood sugars ACHS. LVAD #s WNL. Bilateral lymph wraps in place. BLE edema +2/+3. Up ad tiffanie to chair in room, SBA in hallway with GB. L PIV infusing.     P: Report changes to Cards 2 team.

## 2022-09-10 NOTE — PLAN OF CARE
A&O x 4. Slept well overnight with PRN Ambien. V-Paced 60s. MAPs 84 to 90. LVAD #s WNL. No alarms. Driveline dressing CDI. RA. LS clear. No cough. R PIV infusing Bumex at 2 mg/hr (8 mL/hr). Denies pain. SBA. ACHS BG checks. 1.5L FR. 2G Na.

## 2022-09-10 NOTE — PROGRESS NOTES
[facility]    Cardiology Progress Note- Cardiology        Date of Admission:  9/2/2022     Assessment & Plan: SL   Eliseo Tanner is a 67 year old male admitted on 9/2/2022. He has a history of chronic systolic HF 2/2 NICM s/p LVAD 2/2020, CAD, CKD 3B, afib, T2DM who is admitted with weight gain and increasing peripheral edema c/w CHF exacerbation.    Changes today:  - Continue bumex gtt @ 2 mg/hr  - Trial acetazolamide 500 mg x1 today in setting of contraction alkalosis with diuril  - Plan to resume PO diuretics tomorrow 9/11  - Cystatin C pending  - Started Entresto 12-13 mg BID  - Continue scheduled KCl 80 mEq TID + high replacement protocol  - Continue q8h BMP/lytes check  - PT consult for possible belt for umbilical hernia per pt's request    Decompensated systolic heart failure secondary to NICM s/p HM3 LVAD as DT on 2/18/2020  Cardiorenal syndrome  Stage D. NYHA Class III.  P/w signs and sx hypervolemia, weight 218 lbs (dry weight ??200 lbs), ongoing edema, SOB with bibasilar crackles however no hypoxia. NT-BNP 6484 on admit, last 4416 in 2/2022. Started bumex gtt. Planned outpt cardiomems per Dr. Cisneros, RHC scheduled for 9/20.  Moderate MR on Echo 9/3/22 and PCWP 20 mmHg on last RHC 2/10/22 consistent with cardiorenal syndrome.  >>>  Fluid status: hypervolemic    Diuresis: holding PTA PO bumex and diuril    bumex loading dose 4 mg IV in ED    bumex gtt started on admission @ 0.5 mg/hr --> 1 mg/hr --> 2 mg/hr on 9/3    1x bumex 3 mg IV 9/3    2x diuril 500 mg IV 9/4    diuril 500 mg IV in AM + 1g IV in PM 9/5    Diuril 1g IV 9/6    Diuril 1g @ 2pm 9/7    Diuril 1g @ 1pm 9/8, 500 mg @ 7pm    Diuril 500 mg 9/9    Acetazolamide 500 mg IV x1 9/10 in setting of contraction alkalosis    Lymphedema wraps lower extremities ordered 9/7  ACEi/ARB/ARNI: contraindicated due to renal dysfunction (none PTA), will consider resuming entresto prior to discharge if renal  function continues to improve -- resuming Entresto at 1/2 tab dose 12-13mg BID  BB: None due to history of shock.  Aldosterone antagonist: contraindicated due to renal dysfunction (none PTA)  SCD prophylaxis: ICD  NSAID use: none  Sleep Apnea Evaluation: has ABHINAV, uses CPAP at home - continue inpatient  BP: amlodipine 10 mg daily, hydralazine 100 g 3 times daily, long-acting isosorbide 90 mg daily - cont as BPs allow  LDH trends: 290-310 for past year, last 253 5/2022, recheck on admission 273; will monitor q48 hrs  Anticoagulation: warfarin INR goal 1.7-2.3, pharmacy dosing  Antiplatelet: ASA 81 mg daily - continue   RV support: none  Lytes:    q8h bmp/mg/phos (0500, 1300, 2100) with high replacement protocols    Increase to 100 mEq K TID on 9/7    scheduled Mag 400 mg daily at admission, discontinued 9/4, will replace per protocol  LVAD: HeartMate 3    Flow 5.3 LPM    PI 2.9    Speed 5500 rpm --> increased to 5600 on 9/3 --> 5700 on 9/4 --> 5800 on 9/5  Echo LVAD 9/3/22:    LV EF 10-15%, LVEDd 6.0 cm (compared to 5.5 cm 2/10/2022)    RV overload, moderate dilation, moderately reduced function    mild-moderate mitral insufficiency    mild aortic insufficiency    IVC dilated >2.1 cm c/w high RA pressure ?15 mmHg  Repeat echo lvad 9/8:    Left ventricular function is decreased. The ejection fraction is 15-20% (severely reduced).    Global right ventricular function is moderately reduced.    Mild to moderate mitral insufficiency is present.    Moderate tricuspid insufficiency is present.    No pericardial effusion is present.    Hyponatremia, mild  Na 133 on admission, fluctuating 133-136 for past 1 month.  - trend BMP while diuresing as above    Chronic MSSA drive line infection  Baseline leakage from LVAD site, no fevers, does not meet SIRS criteria.  - cont chronic suppressive therapy with cefadroxil 500 mg BID    CKD, stage 3B  Cr 2, recent baseline 1.8-2.1 per chart review. Has been steadily trending up over past  few months. Follows with nephrology.  - trend while diuresing  - avoid nephrotoxins  - cont PTA nephrocaps    Hx of Afib w/ RVR and aberrancy vs prison vs AT vs slow VT  S/p DCCV on 2/28/21 back into sinus rhythm. Sinus bradycardia with increased RV pacing in 2/2022 so increased lower pacer rate from 40 to 60 and turned on rate response.  Most recent device interrogation (5/3/22) AF burden 0%.  - continue amiodarone 200 mg daily  - AC with warfarin as above    CAD  Mild to moderate CAD with severe branch disease per [facility] 11/2019.  - cont ASA, statin    Hypothyroidism  Last TSH 10.5 on 8/3/22. On amiodarone as above  - recheck TSH 8.06 on 9/3  - PTA levothyroxine 50 mcg daily --> increased to 75 mcg 9/3  - will need recheck TSH in 4 weeks as outpatient (~10/5/22)    DM II  A1c 6 on 8/25/22.  Previously on 10 units glargine, appears this may have been recently discontinued and dapagliflozin started by PCP.  - hold PTA dapagliflozin/empagliflozin  - LDSSI    Insomnia   Reports he cannot sleep in hospital without pta ambien. States he has tried trazodone and melatonin without any success.  - cont PTA ambien, low threshold to hold if any concerns for delirium    BPH, urinary retention  - cont PTA finasteride, tamsulosin    GERD  - cont PTA PPI    Gout  - cont PTA allopurinol    IgG Kappa monoclonal gammopathy of undetermined significance  - Followed by heme/onc outpt    Diet: Cardiac + fluid restrict 1500 mL  DVT ppx: AC with Warfarin  Guevara: no   Code: Full Code    The patient's care was discussed with the Attending Physician, Dr. Dutton.    Heather Hall MD  PGY-1 Internal Medicine  Cards 2 Service  ______________________________________________________________________    Interval History   Methocarbamol 500 mg 1x for back pain. Feeling well this morning. No chest pain/heaviness, no SOB, no abdominal pain.    Data reviewed today: I reviewed all medications, new labs and imaging results over the last 24 hours.    Physical  Exam   Vital Signs: Temp: 98.1  F (36.7  C) Temp src: Oral BP: 95/79 Pulse: 62   Resp: 18 SpO2: 98 % O2 Device: None (Room air)    Weight: 179 lbs 3.2 oz     Temp:  [97.6  F (36.4  C)-98.1  F (36.7  C)] 98.1  F (36.7  C)  Pulse:  [59-65] 62  Resp:  [16-18] 18  BP: ()/(73-84) 95/79  SpO2:  [94 %-99 %] 98 %    I/O last 3 completed shifts:  In: 1679.87 [P.O.:1490; I.V.:189.87]  Out: 5050 [Urine:5050]    Gen: awake, alert, NAD  Resp: No increased work of breathing on room air, no accessory muscle use  Cardio: appreciate LVAD, BLE without edema, shin bones palpable  GI: distended but soft, nontender  Psych: cooperative, mood and affect congruent    Data   Recent Labs   Lab 09/10/22  1341 09/10/22  1149 09/10/22  0809 09/10/22  0728 09/09/22  2050 09/09/22  1643 09/09/22  1403 09/09/22  0645 09/09/22  0422 09/08/22  0636 09/08/22  0543   WBC  --   --  7.2  --   --   --   --   --  6.9  --  6.7   HGB  --   --  14.7  --   --   --   --   --  13.8  --  13.0*   MCV  --   --  91  --   --   --   --   --  91  --  92   PLT  --   --  229  --   --   --   --   --  234  --  231   INR  --   --  1.98*  --   --   --   --   --  2.22*  --  2.57*   NA  --   --  141  --  138  --  136  --  141   < > 139   POTASSIUM 4.3  --  3.4  --  3.6  --  4.4  4.3  --  2.9*   < > 3.0*   CHLORIDE  --   --  94*  --  93*  --  93*  --  93*   < > 94*   CO2  --   --  35*  --  35*  --  34*  --  35*   < > 32*   BUN  --   --  50.8*  --  54.3*  --  53.3*  --  49.2*   < > 49.4*   CR  --   --  1.69*  --  1.79*  --  1.79*  --  1.70*   < > 1.68*   ANIONGAP  --   --  12  --  10  --  9  --  13   < > 13   CALOS  --   --  9.2  --  9.5  --  9.2  --  9.3   < > 8.8   GLC  --  152* 167* 99 145*   < > 207*   < > 89   < > 107*    < > = values in this interval not displayed.     No results found for this or any previous visit (from the past 24 hour(s)).  Medications     bumetanide 2 mg/hr (09/09/22 2049)     - MEDICATION INSTRUCTIONS -       ACE/ARB/ARNI NOT PRESCRIBED        BETA BLOCKER NOT PRESCRIBED         allopurinol  200 mg Oral or Feeding Tube Daily     amiodarone  200 mg Oral Daily     amLODIPine  10 mg Oral Daily     aspirin  81 mg Oral Daily     atorvastatin  20 mg Oral Daily     cefadroxil  500 mg Oral BID     [Held by provider] empagliflozin  10 mg Oral Daily     finasteride  5 mg Oral Daily     FLUoxetine  30 mg Oral Daily     hydrALAZINE  100 mg Oral TID     insulin aspart  1-3 Units Subcutaneous TID AC     insulin aspart  1-3 Units Subcutaneous At Bedtime     isosorbide mononitrate  90 mg Oral Daily     levothyroxine  75 mcg Oral QAM AC     multivitamin RENAL  1 capsule Oral Daily     pantoprazole  40 mg Oral QAM AC     potassium chloride  80 mEq Oral TID     [Held by provider] potassium chloride  20 mEq Oral Once     sacubitril-valsartan  1 half-tab Oral BID     senna-docusate  1 tablet Oral BID     tamsulosin  0.8 mg Oral Daily     warfarin ANTICOAGULANT  2 mg Oral ONCE at 18:00     Warfarin Therapy Reminder  1 each Oral See Admin Instructions

## 2022-09-11 NOTE — PROGRESS NOTES
Interrogation:  Inpatient      Type of VAD:  HM3      Current Parameters:  Flow 5.2-5.3, PI  2.4, speed 5800, flow 4.9      Abnormal Alarm on History:  None     Abnormal Events/Parameters Notes:  None      Changes Made during Interrogation:  No changes made         Destiny Salmeron MD

## 2022-09-11 NOTE — PROGRESS NOTES
Interrogation:  Inpatient      Type of VAD:  HM3      Current Parameters:  Flow 5.1, PI  2.8, speed 5800, flow 4.8      Abnormal Alarm on History:  None     Abnormal Events/Parameters Notes:  None      Changes Made during Interrogation:  No changes made         Destiny Salmeron MD

## 2022-09-11 NOTE — DISCHARGE SUMMARY
[facility]   Cardiology Discharge Summary    Date of Admission:  9/2/2022  Date of Discharge:  9/11/2022   Discharging Provider: Jessica Dutton MD  Date of Service: 9/11/2022     Primary Care     Jay Steele [facility] [id number]      Identification and Chief Compaint: Eliseo Tanner is a 67 year old male who presented on 9/2/2022 with complaint of weight gain, fluid retention, shortness of breath.    Discharge Diagnoses   Decompensated systolic heart failure 2/2 Nonischemic cardiomyopathy s/p HeartMate3 LVAD as Destination Therapy on 2/18/2020  Cardiorenal syndrome  Hyponatremia  Hypokalemia  Chronic MSSA drive line infection  Chronic Kidney Disease, stage 3B  History of atrial fibrillation w/ rapid ventricular response and aberrancy  Coronary Artery Disease  Hypothyroidism  Type 2 Diabetes Mellitus  Insomnia  Benign Prostatic Hypertrophy with urinary retention  Gastroesophageal Reflux Disease  Gout  IgG Kappa monoclonal gammopathy of undetermined significance    Discharge Disposition   Discharged to home    Discharge Orders      TSH with free T4 reflex     Follow-Up with Cardiology      Reason for your hospital stay    Heart failure exacerbation     Activity    Your activity upon discharge: activity as tolerated     Follow Up ([facility])    Follow up with primary care provider, Jay Steele, within 7 days for hospital follow- up.  The following labs/tests are recommended: Repeat TSH w/ free T4 in ~1 month.      Appointments on [place] and/or [facility] (with [facility] or [facility] provider or service). Call [phone] if you haven't heard regarding these appointments within 7 days of discharge.     Diet    Follow this diet upon discharge: Orders Placed This Encounter      Fluid restriction 1500 ML FLUID      2 Gram Sodium Diet     Discharge Medications   Discharge Medication List as of 9/11/2022 12:19 PM      START taking these medications    Details    sacubitril-valsartan (ENTRESTO) 24-26 MG per tablet Take 0.5 tablets by mouth 2 times daily, Disp-60 tablet, R-1, E-Prescribe1/2 tablet for dose of 12-13 mg twice daily         CONTINUE these medications which have CHANGED    Details   levothyroxine (SYNTHROID/LEVOTHROID) 75 MCG tablet Take 1 tablet (75 mcg) by mouth every morning (before breakfast), Disp-30 tablet, R-1, E-Prescribe         CONTINUE these medications which have NOT CHANGED    Details   allopurinol (ZYLOPRIM) 100 MG tablet 2 tablets (200 mg) by Oral or Feeding Tube route daily, Disp-60 tablet,R-1, E-Prescribe      amiodarone (PACERONE) 200 MG tablet TAKE 1 TABLET BY MOUTH ONCE DAILY, Disp-90 tablet, R-3, E-Prescribe      amLODIPine (NORVASC) 5 MG tablet Take 2 tablets (10 mg) by mouth daily, Disp-180 tablet, R-3, E-Prescribe      aspirin (ASA) 81 MG EC tablet Take 1 tablet (81 mg) by mouth daily, Disp-90 tablet,R-3, E-Prescribe      atorvastatin (LIPITOR) 20 MG tablet Take 1 tablet (20 mg) by mouth daily, Disp-90 tablet, R-3, E-Prescribe      cefadroxil (DURICEF) 500 MG capsule Take 1 capsule (500 mg) by mouth 2 times daily, Disp-60 capsule, R-3, E-Prescribe      co-enzyme Q-10 200 MG CAPS 200 mg by Oral or Feeding Tube route daily, Historical      dapagliflozin (FARXIGA) 5 MG TABS tablet Take 5 mg by mouth daily, 5 mg, Oral, DAILY, Historical      finasteride (PROSCAR) 5 MG tablet Take 1 tablet (5 mg) by mouth daily Helps with urinary retention., Disp-30 tablet, R-0, E-Prescribe      FLUoxetine (PROZAC) 10 MG capsule Take 30 mg by mouth daily, Historical      hydrALAZINE (APRESOLINE) 100 MG tablet Take 1 tablet (100 mg) by mouth 3 times daily, Disp-270 tablet, R-3, E-Prescribe      insulin glargine (BASAGLAR KWIKPEN) 100 UNIT/ML pen Inject 10 Units Subcutaneous At Bedtime , HistoricalIf Basaglar is not covered by insurance, may substitute Lantus at same dose and frequency.        insulin pen needle (32G X 4 MM) 32G X 4 MM miscellaneous Historical       omeprazole (PRILOSEC) 20 MG DR capsule Take 20 mg by mouth daily, Historical      senna-docusate (SENOKOT-S/PERICOLACE) 8.6-50 MG tablet Take 1 tablet by mouth 2 times daily as needed for constipation , Historical      B Complex-C-Folic Acid (RENAL) 1 MG CAPS Take 1 capsule by mouth daily, Disp-30 capsule, R-0, E-PrescribeFurther refills should go to primary care or nephrology      bumetanide (BUMEX) 1 MG tablet Take 4 tablets (4 mg) by mouth 2 times daily, Disp-720 tablet, R-3, E-Prescribe      hydrochlorothiazide (HYDRODIURIL) 50 MG tablet Take 50mg (1 tab) every Wednesday & Saturday's, Disp-50 tablet, R-3, E-Prescribe      isosorbide mononitrate (IMDUR) 30 MG 24 hr tablet Take 3 tablets (90 mg) by mouth daily, Disp-270 tablet, R-3, E-Prescribe      magnesium oxide (MAG-OX) 400 MG tablet 1 tablet (400 mg) by Oral or Feeding Tube route daily, Disp-30 tablet,R-1, E-Prescribe      nitroGLYcerin (NITROSTAT) 0.4 MG sublingual tablet For chest pain place 1 tablet under the tongue every 5 minutes for 3 doses. If symptoms persist 5 minutes after 1st dose call 911., Disp-30 tablet, R-0, E-Prescribe      potassium chloride ER (KLOR-CON M) 20 MEQ CR tablet Take 3 tablets (60 mEq) by mouth 2 times daily, Disp-540 tablet, R-3, E-Prescribe      tamsulosin (FLOMAX) 0.4 MG capsule Take 2 capsules (0.8 mg) by mouth daily This med helps with urinary retention, Disp-30 capsule, R-0, Historical      !! warfarin ANTICOAGULANT (COUMADIN) 2 MG tablet Take by mouth daily Take 4 mg PO on Sundays and Wednesdays, and take 2 mg PO on all other days., Historical      !! warfarin ANTICOAGULANT (COUMADIN) 4 MG tablet TAKE 1 TO 2 TABLETS (4 MG TO 8 MG) BY MOUTH ONCE DAILY AS DIRECTED, Disp-90 tablet, R-3, E-Prescribe      zolpidem (AMBIEN) 5 MG tablet Take 5 mg by mouth nightly as needed for Insomnia, Historical       !! - Potential duplicate medications found. Please discuss with provider.        Allergies   Allergies   Allergen Reactions      Heparin      HIT screen positive 2/14/20, reflex DAVINA negative; however heme recommended treating as if positive  HIT screen negative 2/11/20     Oxycodone Itching and Other (See Comments)     Chlorhexidine Rash       Consultations This Hospital Stay  LYMPHEDEMA THERAPY    Significant Results and Procedures     Echo LVAD Complete 9/8/22  Interpretation:  1. Left ventricular function is decreased. The ejection fraction is 15-20% (severely reduced).  2. Global right ventricular function is moderately reduced.  3. Mild to moderate mitral insufficiency is present.  4. Moderate tricuspid insufficiency is present.  5. No pericardial effusion is present.    History of Present Illness   Eliseo Tanner is a 67 year old male with a past medical history significant for systolic heart failure 2/2 NICM s/p LVAD (2/18/2020) c/b recurrent drive-line infections, T2DM, ABHINAV on CPAP, HTN, CKD stage III who is admitted for heart failure exacerbation.     Reporting significant weight gain (dry weight 211 lbs, now 218 lbs) - seems to have been going on for about 1 month. Also reports fluid retention in abdomen and BLE, SOB. He tells me his SOB has been stable for the past several months. He typically sleeps on his side and has no issues with waking up at night due to breathing issues, however does use CPAP. He denies any wheezing, does not use inhalers at home. States he had been taking diuril per Dr. Cisneros's recommendations on Weds and Sat for the past few weeks, would experience transient weight loss but over the past week he has had no real response to the diuril. Pt describes chronic drainage from LVAD site x1 year without recent changes in fluid volume or color. He has had no fevers or chills at home. No dizziness and is able to walk around without assistance. He does describe some constipation recently for which he has taken a pill at home that does not seem to help much.     Per chart review, appears patient has noticed  increasing abdominal distension and weight gain since end of July 2022, at which time he was trialed on hydrochlorothiazide 50 mg daily x2 days, bumex increased to 4 mg BID.  On 8/3, noted weight to be up to 216 from 211 lbs, and was started on 50 mg diuril qweekly on wednesdays.  No appreciable weight change on f/u, diuril further increased to 50 mg BID weds and sat, and plan made for cardiomems.  8/25 weight 215 lbs and pt reported continued symptoms without appreciable change, Dr. Cisneros recommended admission for IV diuresis and pt declined.  9/1 - again no weight change, again recommended present to ED, which he ultimately did this evening.     In ED, pt was hemodynamically stable, sats mid 90s on RA, /87. Given 4 mg IV bumex then started on bumex gtt @ 0.5 mg/hr @ 1700. Pt requesting to use urinal, would like to try this before lombardo.  Labs with Cr 2, K 3.7, Na 133.  EKG obtained, no imaging.    Hospital Course   Eliseo Tanner was admitted on 9/2/2022.  The following problems were addressed during his hospitalization:    For follow up:  1. Follow up 9/20 with Dr. Cisneros[facility]  2. Will need recheck TSH in 4 weeks as outpatient (~10/5/22)    Decompensated systolic heart failure secondary to NICM s/p HM3 LVAD as DT on 2/18/2020  Cardiorenal syndrome  Stage D. NYHA Class III.  P/w signs and sx hypervolemia, weight 218 lbs, ongoing edema, SOB with bibasilar crackles however no hypoxia. NT-BNP 6484 on admit, last 4416 in 2/2022. Moderate MR on Echo 9/3/22 and PCWP 20 mmHg on last RHC 2/10/22 consistent with cardiorenal syndrome. Diuresed with Bumex gtt and intermittent diuril and acetazolamide with good response. Net negative 31.1 L, weight at discharge 178 lb. Follows with Dr. Cisneros outpatient. LVAD speed was 5500 rpm on admission, in conversations with Dr. Cisneros, speed was increased to 5800 rpm during this hospitalization and tolerated well.  - Volume status at discharge: euvolemic, new dry  weight: 178 lbs  - Diuretics: resumed PTA Bumex 4 mg BID  - ACEi/ARB/ARNI: on Entresto in past, restarted this admission at 1/2 tab dose 12-13mg BID  - BB: None due to history of shock.  - Aldosterone antagonist: contraindicated due to renal dysfunction (none PTA)  - SCD prophylaxis: ICD  - NSAID use: none  - Sleep Apnea Evaluation: has ABHINAV, continued home CPAP  - BP: continued PTA amlodipine 10 mg daily, hydralazine 100 mg TID, Imdur XR 90 mg daily  - LDH trends: 290-310 for past year  - Anticoagulation: continued PTA warfarin, INR goal 1.7-2.3  - Antiplatelet: continued PTA ASA 81 mg daily  - RV support: none  - Lytes: resumed PTA KCl 60 mEq BID  - LVAD: HM3, speed 5800 rpm at discharge  - Follow up outpatient 9/20 for RHC and CardioMEMS with Dr. Cisneros    Hyponatremia, resolved  Hypokalemia, resolved  Na 133 on admission, fluctuating 133-136 for past 1 month. K monitored with q8h BMP and scheduled + prn KCl.     Chronic MSSA drive line infection  Baseline leakage from LVAD site, no fevers, does not meet SIRS criteria.  - continued PTA chronic suppressive therapy with cefadroxil 500 mg BID     CKD, stage 3B  Cr 2, recent baseline 1.8-2.1 per chart review. Has been steadily trending up over past few months. Follows with nephrology.  - continued PTA nephrocaps     Hx of Afib w/ RVR and aberrancy vs Aflutter vs AT vs slow VT  S/p DCCV on 2/28/21 back into sinus rhythm. Sinus bradycardia with increased RV pacing in 2/2022 so increased lower pacer rate from 40 to 60 and turned on rate response. Most recent device interrogation (5/3/22) AF burden 0%.  - continued PTA amiodarone 200 mg daily  - continued PTA warfarin as above     CAD  Mild to moderate CAD with severe branch disease per [facility] 11/2019.  - continued PTA ASA  - continued PTA atorvastatin     Hypothyroidism  Last TSH 10.5 on 8/3/22. On amiodarone as above. Recheck TSH 8.06 on 9/3.  - increased PTA levothyroxine 50 mcg daily to 75 mcg  - will need recheck TSH in  4 weeks as outpatient (~10/5/22)     DM II  A1c 6 on 8/25/22. Previously on 10 units glargine, appears this may have been recently discontinued and dapagliflozin started by PCP. Low dose sliding scale insulin while inpatient.  - resumed PTA dapagliflozin at discharge as above     Insomnia   Reports he cannot sleep in hospital without pta ambien. States he has tried trazodone and melatonin without any success.  - continued PTA ambien     BPH, urinary retention  - continued PTA finasteride  - continued PTA tamsulosin     GERD  - continued PTA PPI     Gout  - continued PTA allopurinol     IgG Kappa monoclonal gammopathy of undetermined significance  - continue to follow with heme/onc outpt      Pending Results   Unresulted Labs Ordered in the Past 30 Days of this Admission     No orders found from 8/3/2022 to 9/3/2022.          Physical Exam   Temp:  [97.5  F (36.4  C)-97.9  F (36.6  C)] 97.9  F (36.6  C)  Pulse:  [] 62  Resp:  [16-18] 18  BP: (81-92)/(53-71) 81/71  SpO2:  [96 %-100 %] 96 %    Gen: awake, alert, NAD  Resp: No increased work of breathing on room air, no accessory muscle use  Cardio: appreciate LVAD, BLE without edema, shin bones palpable  GI: distended but soft, nontender  Psych: cooperative, mood and affect congruent    The patient's care was discussed with the Attending Physician, Dr. Dutton.    Heather Hall MD  PGY-1 Internal Medicine  Cards 2 Service

## 2022-09-11 NOTE — PLAN OF CARE
DX: hypervolemia  PMH:  chronic systolic HF 2/2 NICM s/p LVAD 2/2020, CAD, CKD 3B, afib, T2DM      Code Status: full  Team: cards 2     Cardiac: VSS, LVAD # WDL, paced   Respiratory: Diminished lung sounds in bases but sats at 95% on RA. Pt states he's feeling like he is able to take longer walks than when he was admitted   Neuro: AxO x4  Pain: denies   GI/: urinating frequently, had 1 bm on shift  Diet: Cardiac   Skin: LVAD driveline had a small amount of purulent drainage that pt states is normal   Activity: SBA    Gtts/fluid: Bumex @ 2mg/hr     Plan: Follow POC

## 2022-09-11 NOTE — DISCHARGE INSTRUCTIONS
Your dry weight is 178 lbs.  Take your weight at the same time each morning while standing.  If you gain 3 or more pounds in 1 day, call your VAD coordinator and take an extra 4 mg dose of bumex.      You will follow up with Dr. Cisneros on 9/20/22.      You will have outpatient labs in ~1 month to recheck your thyroid function.

## 2022-09-11 NOTE — PROGRESS NOTES
Indication of Interrogation:  Inpatient     Type of VAD:  HM3     Current Parameters:  Flow 5.2, PI  3.0, speed 5800, flow 4.9     Abnormal Alarm on History:  None   Abnormal Events/Parameters Notes:  None     Changes Made during Interrogation:  No changes made       Destiny Samleron MD

## 2022-09-11 NOTE — PLAN OF CARE
Pt is A&O x 4. Pleasant affect. Slept well overnight with PRN Ambien. V-Paced 50s-60s. MAPs 60s. LVAD #s WNL. No alarms. Driveline dressing CDI. RA. LS clear. No cough. R PIV infusing Bumex at 2 mg/hr (8 mL/hr). Denies pain. Up independently in room. ACHS BG checks. Compliant with 1.5L FR. 2G Na.

## 2022-09-12 NOTE — PROGRESS NOTES
Called patient/caregiver to check in 2 days post discharge. Current LVAD numbers, Speed: 5800, Flow: 5.1, PI: 1.6 - 5.2, Power: 4.7, & weight 183 lbs today, yesterday 182 (per home scale).  Speed was increased to 5800 while inpatient and now PI pretty variable.  Reviewed medications and answered any questions. Pt was restarted on Entresto during this admission. No other med changes.  Patient reports sleeping much better and greatly improved anxiety since being home. Patient is able to move around the house and care for himself, with assistance of a cane.    Discussed upcoming RHC and cardiomems implant instructions.  Emailed all instructions to pt    Right Heart Cath and CardioMEMS Implant Instructions    A Right Heart Cath is a test that measures how well your heart is pumping blood to your body and will assess the volume and pressures in your heart. A CardioMEMS device is implanted in the Pulmonary artery during a Right Heart Cath.  Once your CardioMEMS device is implanted, you will do daily readings at home that are electronically sent to your Cardiology Team and we will adjust your medications off of these internal numbers.       You are scheduled for a Right Heart Catheterization/CardioMEMS implant at the [facility], [address] [place] [address], [place] [address]. You are to check in at the Gold Waiting Area.     When you arrive you will be escorted back to the pre procedure area called 2A. Here they will insert an IV, draw labs, and obtain a short medical history. Please bring an updated list of your current medications.    A physician will come and talk with you about the procedure and obtain consent.    A nurse from the Cardiac Catheterization Lab will then escort you to the procedure area. You will receive a shot to numb the site where the catheter (tube) will enter your body in your groin (femoral vein).  You may receive sedation or anesthesia.  You will need a  to  bring you home. You cannot drive yourself home if you received sedation.    After the procedure you will recover for a short period on 6C in the hospital.  You will receive CardioMEMS education on how to do your daily home readings before you go home. You will be discharged home that day if no complications arise.       Pre procedure instructions:     1.  Do not eat any solid food or milk products for 6 hours prior to the exam. You may drink clear liquids until 2 hours prior to the exam. Clear liquids include the following: water, Jell-O, clear broth, apple juice or any non-carbonated drink that you can see through (no pop!).   2.  Do not drink alcohol or smoke 24 hours prior to test.  3.  Hold these meds:  Do not take your oral diabetic medication or short acting insulin the day of the procedure.      Hold your anticoagulation medication:        Warfarin: Discuss plan for coumadin with the anticoagulation clinic.  Your INR will need to be drifted down to a goal of 1.5 - 2.0, on the day of the procedure.  We will not bridge you with Lovenox.    Aspirin: Hold for 1 WEEK (7 Days) prior. September 12th will be your last day of Aspirin.    After the CardioMEMS is implanted, you may restart your warfarin that day if no complications arise.   4. You may take your other morning medications as prescribed with a sip of water. You may hold your diuretic that morning.       Post procedure instructions:     Patients who were not on chronic anticoagulation (warfarin or NOACs) therapy prior to implant:  1. You will be on an antiplatelet medication called Plavix/clopidigrel 75mg daily for 1 month after your procedure.  2. You will be on a baby aspirin 81mg (325mg if you were on 325mg prior to procedure) daily for life.   3. You will complete a CardioMEMS reading by laying on the monitor/pillow that you were provided daily.    Patients who were on chronic anticoagulation (warfarin or NOACs) therapy prior to implant:  1. You will  restart your Anticoagulation medication (warfarin or NOAC) the day of your procedure when you get home.    A. Please see specific instructions for details; you may require bridging with lovenox if you INR was low today.   2. You will complete a CardioMEMS reading by laying on the monitor/pillow that you were provided daily.

## 2022-09-12 NOTE — PROGRESS NOTES
Occupational Therapy Discharge Summary    Reason for therapy discharge:    Discharged to home with outpatient therapy.    Progress towards therapy goal(s). See goals on Care Plan in [facility] electronic health record for goal details.  Goals partially met.  Barriers to achieving goals:   discharge from facility.    Therapy recommendation(s):    Continued therapy is recommended.  Rationale/Recommendations:  To progress functional endurance and ADL tolerance safely. .

## 2022-09-12 NOTE — PROGRESS NOTES
Clinic Care Coordination Contact  [facility]: Post-Discharge Note  SITUATION                                                      Admission:    Admission Date: 09/02/22   Reason for Admission: Heart failure exacerbation  Discharge:   Discharge Date: 09/11/22  Discharge Diagnosis: Heart failure exacerbation    BACKGROUND                                                      Per hospital discharge summary and inpatient provider notes:  Eliseo Tanner is a 67 year old male with a past medical history significant for systolic heart failure 2/2 NICM s/p LVAD (2/18/2020) c/b recurrent drive-line infections, T2DM, ABHINAV on CPAP, HTN, CKD stage III who is admitted for heart failure exacerbation.     Reporting significant weight gain (dry weight 211 lbs, now 218 lbs) - seems to have been going on for about 1 month. Also reports fluid retention in abdomen and BLE, SOB. He tells me his SOB has been stable for the past several months. He typically sleeps on his side and has no issues with waking up at night due to breathing issues, however does use CPAP. He denies any wheezing, does not use inhalers at home. States he had been taking diuril per Dr. Cisneros's recommendations on Weds and Sat for the past few weeks, would experience transient weight loss but over the past week he has had no real response to the diuril. Pt describes chronic drainage from LVAD site x1 year without recent changes in fluid volume or color. He has had no fevers or chills at home. No dizziness and is able to walk around without assistance. He does describe some constipation recently for which he has taken a pill at home that does not seem to help much.     Per chart review, appears patient has noticed increasing abdominal distension and weight gain since end of July 2022, at which time he was trialed on hydrochlorothiazide 50 mg daily x2 days, bumex increased to 4 mg BID.  On 8/3, noted weight to be up to 216 from 211 lbs, and was started on 50 mg  "diuril qweekly on wednesdays.  No appreciable weight change on f/u, diuril further increased to 50 mg BID weds and sat, and plan made for cardiomems.  8/25 weight 215 lbs and pt reported continued symptoms without appreciable change, Dr. Cisneros recommended admission for IV diuresis and pt declined.  9/1 - again no weight change, again recommended present to ED, which he ultimately did this evening.     In ED, pt was hemodynamically stable, sats mid 90s on RA, /87. Given 4 mg IV bumex then started on bumex gtt @ 0.5 mg/hr @ 1700. Pt requesting to use urinal, would like to try this before lombardo.  Labs with Cr 2, K 3.7, Na 133.  EKG obtained, no imaging.    ASSESSMENT           Discharge Assessment  How are you doing now that you are home?: \" Doing well I even slept well \"  How are your symptoms? (Red Flag symptoms escalate to triage hotline per guidelines): Improved  Do you feel your condition is stable enough to be safe at home until your provider visit?: Yes  Does the patient have their discharge instructions? : Yes  Does the patient have questions regarding their discharge instructions? : No  Were you started on any new medications or were there changes to any of your previous medications? : Yes  Does the patient have all of their medications?: Yes  Do you have questions regarding any of your medications? : No  Do you have all of your needed medical supplies or equipment (DME)?  (i.e. oxygen tank, CPAP, cane, etc.): Yes  Discharge follow-up appointment scheduled within 14 calendar days? : Yes  Discharge Follow Up Appointment Date: 09/20/22  Discharge Follow Up Appointment Scheduled with?: Specialty Care Provider    Post-op ([facility] Only)  If the patient had a surgery or procedure, do they have any questions for a nurse?: No             PLAN                                                      Outpatient Plan:    Your activity upon discharge: activity as tolerated          Follow Up ([facility])     Follow up " with primary care provider, Jay Steele, within 7 days for hospital follow- up.  The following labs/tests are recommended: Repeat TSH w/ free T4 in ~1 month.       Appointments on [place] and/or [facility] (with [facility] or [facility] provider or service). Call [phone] if you haven't heard regarding these appointments within 7 days of discharge.          Diet     Follow this diet upon discharge: Orders Placed This Encounter      Fluid restriction 1500 ML FLUID      2 Gram Sodium Diet         Future Appointments   Date Time Provider Department Center   9/20/2022  8:00 AM UU LAB GOLD WAITING UOPLB [facility]   9/20/2022  8:30 AM [facility]   9/20/2022  2:30 PM [facility]   9/20/2022  3:00 PM  CV DEVICE 1 UCCVCV [facility]   9/20/2022  3:30 PM [facility]   12/7/2022  3:30 PM Nader Cisneros MD SXHRT [facility]   12/20/2022 12:00 AM UC ICD REMOTE UCCVSV [facility]         For any urgent concerns, please contact our 24 hour nurse triage line: [phone])         Yessenia Bills MA

## 2022-09-12 NOTE — TELEPHONE ENCOUNTER
RHC/CardioMEMS implant 9/20: INR goal of 1.5 - 2.0, on the day of the procedure.  We will not bridge you with Lovenox.     Since Eliseo's INR goal range is 1.7-2.3, will plan to check INR on Friday, September 16 and determine a plan to keep INR below 2 for the 9/20 procedure.    Sarah Gaffney, PatelD, BCPS

## 2022-09-12 NOTE — TELEPHONE ENCOUNTER
ANTICOAGULATION  MANAGEMENT: Discharge Review    Eliseo Tanner chart reviewed for anticoagulation continuity of care    Hospital Admission on 9/2-9/11/22 for weight gain, fluid retention, and shortness of breath-hypervolemia.    Discharge disposition: Home    Results:    Recent labs: (last 7 days)     09/06/22  0633 09/07/22  0535 09/08/22  0543 09/09/22  0422 09/10/22  0809 09/11/22  0727   INR 2.39* 2.73* 2.57* 2.22* 1.98* 1.92*     Anticoagulation inpatient management:     see tracking calendar    Anticoagulation discharge instructions:     Warfarin dosing: home regimen continued   Bridging: No   INR goal change: No      Medication changes affecting anticoagulation: No    Additional factors affecting anticoagulation: Yes:     Received in-basket message from V[facility] CC: Anticoag plan for Eliseo's Cardiomems on 9/20     Warfarin: Discuss plan for coumadin with the anticoagulation clinic.  Your INR will need to be drifted down to a goal of 1.5 - 2.0, on the day of the procedure.  We will not bridge you with Lovenox.     Aspirin: Hold for 1 WEEK (7 Days) prior. September 12th will be your last day of Aspirin.     After the CardioMEMS is implanted, you may restart your warfarin that day if no complications arise.     Procedure plan sent to [facility] for dosing recommendations prior to procedure as INR will need to be 1.5-2.0.      PLAN     Recommend to check INR on 9/16/22    Spoke with Eliseo    Anticoagulation Calendar updated    PACO MARQUEZ RN

## 2022-09-16 NOTE — PROGRESS NOTES
ANTICOAGULATION MANAGEMENT     Eliseo Tanner 67 year old male is on warfarin with subtherapeutic INR result. (Goal INR 1.7-2.3)    Recent labs: (last 7 days)     09/15/22  0000   INR 1.5*       ASSESSMENT       Source(s): Patient/Caregiver Call       Warfarin doses taken: Warfarin taken as instructed    Diet: No new diet changes identified    New illness, injury, or hospitalization: Yes previously hospitalized for fluid overload     Medication/supplement changes: None noted    Signs or symptoms of bleeding or clotting: No    Previous INR: Therapeutic last 2(+) visits    Additional findings: Upcoming surgery/procedure 9/20 RHC and Cardio MEMS implantation. INR needs to be 1.5 and not Lovenox bridging.       PLAN     Recommended plan for temporary change(s) affecting INR     Dosing Instructions: booster dose then continue your current warfarin dose with next INR in 4 days       Summary  As of 9/16/2022    Full warfarin instructions:  9/16: 3 mg; Otherwise 4 mg every Sun, Wed; 2 mg all other days   Next INR check:  9/20/2022             Telephone call with Eliseo who verbalizes understanding and agrees to plan and who agrees to plan and repeated back plan correctly    Lab visit scheduled    Education provided: None required    Plan made with [facility] Pharmacist Sarah Gaffney and per LVAD protocol    Luiza Perkins, RN  Anticoagulation Clinic  9/16/2022    _______________________________________________________________________     Anticoagulation Episode Summary     Current INR goal:  1.7-2.3   TTR:  41.9 % (11.7 mo)   Target end date:  Indefinite   Send INR reminders to:  ANTICOAG LVAD    Indications    LVAD (left ventricular assist device) present (H) [Z95.811]  Chronic systolic congestive heart failure (H) [I50.22]  Paroxysmal atrial fibrillation (H) [I48.0]           Comments:  7/12/22 GOAL RANGE CHANGE 1.7-2.3   HM 3 placed 2/18/2020, 81mg ASA,  5/6/22 [employer]         Anticoagulation Care Providers     Provider  Role Specialty Phone number    Nader Cisneros MD Referring Cardiovascular Disease [phone]

## 2022-09-19 NOTE — TELEPHONE ENCOUNTER
Left voicemail for patient to complete Travel Screen for Cardiac Cath Lab appointment on 9/20 and inform patient of updated Visitor Policy.     Informed pt on message of need for covid test 1-2 days prior and to take picture of results.

## 2022-09-20 NOTE — PATIENT INSTRUCTIONS
It was a pleasure to see you in clinic today. Please do not hesitate to call with any questions or concerns. You are scheduled for a remote transmission on 12/20/22. We look forward to seeing you in clinic at your next device check in 6 months.     Kelsie Garcia, KRISTEN  Electrophysiology Nurse Clinician  [facility]    During business hours please call: [phone]  After hours please call: [phone] - select option #4 and ask for job code [phone]

## 2022-09-20 NOTE — PROGRESS NOTES
On check in this am pt showed home covid test, pink dye was distributed over test, results nebulous. Re-did nasal swab 15 min Covid this am; clear negative result.

## 2022-09-20 NOTE — LETTER
9/20/2022      RE: Eliseo Tanner  [address] [place] [address]       Dear Colleague,    Thank you for the opportunity to participate in the care of your patient, Eliseo Tanner, at the [facility] [place] [facility]. Please see a copy of my visit note below.    September 20, 2022     Eliseo Tanner is a 67 yr old male with a past medical history of chronic systolic heart failure, Stage D, Class III, nonischemic cardiomyopathy, now s/p HM3 implant on 2/18/2020, HTN, GERD, ABHINAV on CPAP, DM2 and CKD3. He presents for follow up. Last seen on 05/03/2022, at that time he was started on Imdur 30 mg once a day given ongoing persistent hypertension despite being on hydralazine 100 mg 3 times daily as well as amlodipine 10 mg daily.      Pertinent Hx:   - His HM3 post-op course was complicated by retrosternal hematoma and bleeding in the lungs, RV failure, VT and Aflutter w/RVR s/p DCCV. He had pre-op proteus and enterococcus bacteremia from 2/13/2020, s/p abx. He had an ICD placed on 3/16/2020 just before his discharge from the hospital. Readmitted in 11/2020 with MSSA bacteremia. Abdominal imaging (US and CT abd/pelvis) revealed no intraabdominal fluid collection concerning for abscess/infectious process. TTE did not comment on vegetation. CHAMP also did not show any signs of endocarditis. Last positive BCx on 11/14/20 showed MSSA, and abx were stopped on 12/27/20.     - Readmitted in 02/2021 for mixed cardiogenic and distributive shock with an intermittent wide complex tachycardia (thought to be 2/2 to 3:1 aflutter with aberrant conduction). 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 Initially required high doses of vasopressors and inotropes and was intubated for hypoxemia. LDH was severely elevated, but thought to be related to legionella rather than actual pump thrombus. Had acute liver injury with LFTs in the several thousands. Also had BERNARDA, which required  CRRT. He was treated for legionella pneumonia and ultimately recovered relatively well and was discharged in stable condition not requiring dialysis (cession of HD 3/10/21).    - Was seen in the HF clinic at [place] in 04/2021, at which time he was feeling ok. Had some mild dizziness if changing positions too quickly. No changes were made. He did reports mild brown discharge from the drive line site and increased nose bleeds. His INR goal was decreased to 1.8-2.3.     - In January of 2022 patient tested positive for Covid-19. In February he began feeling more fatigued, increased shortness of breath and reported chills. He underwent further work-up at the [facility] with right heart cath, echo and driveline cultures. Right heart cath revealed elevated filling pressures, echo stable from previous and cultures negative.    - He was admitted from 9/2/2022 - 9/11/2022 for acute heart failure not responsive to escalating PO diuretics. He presented with signs and sx hypervolemia, weight 218 lbs, ongoing edema, SOB with bibasilar crackles however no hypoxia. NT-BNP 6484 on admit, last 4416 in 2/2022. Moderate MR on Echo 9/3/22 and PCWP 20 mmHg on last RHC 2/10/22 consistent with cardiorenal syndrome. Diuresed with Bumex gtt and intermittent diuril and acetazolamide with good response. Net negative 31.1 L, weight at discharge 178 lb. LVAD speed was 5500 rpm on admission, in conversations with Dr. Cisneros, speed was increased to 5800 rpm during this hospitalization and tolerated well.  Resumed PTA Bumex 4 mg BID on discharge.   Today he is here for further evaluation after RHC and Mems placement. He is feeling much better after increasing the speed and swapping over to bumex. His symptoms have improved and overall feeling better. He is active, with class3 symptoms at this point. No LE edema, no bleeding, no stroke like symptoms, no LVAD alarms, falls.     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  SOCIAL HISTORY:  Social History     Socioeconomic History     Marital status:    Tobacco Use     Smoking status: Former Smoker     Quit date: 1994     Years since quittin.1     Smokeless tobacco: Never Used   Substance and Sexual Activity     Alcohol use: Not Currently     Drug use: Not Currently     CURRENT MEDICATIONS:  Current Outpatient Medications   Medication     allopurinol (ZYLOPRIM) 100 MG tablet     amiodarone (PACERONE) 200 MG tablet     amLODIPine (NORVASC) 5 MG tablet     aspirin (ASA) 81 MG EC tablet     atorvastatin (LIPITOR) 20 MG tablet     B Complex-C-Folic Acid (RENAL) 1 MG CAPS     bumetanide (BUMEX) 1 MG tablet     cefadroxil (DURICEF) 500 MG capsule     co-enzyme Q-10 200 MG CAPS     dapagliflozin (FARXIGA) 5 MG TABS tablet     finasteride (PROSCAR) 5 MG tablet     FLUoxetine (PROZAC) 10 MG capsule     hydrALAZINE (APRESOLINE) 100 MG tablet     hydrochlorothiazide (HYDRODIURIL) 50 MG tablet     insulin glargine (BASAGLAR KWIKPEN) 100 UNIT/ML pen     insulin pen needle (32G X 4 MM) 32G X 4 MM miscellaneous     isosorbide mononitrate (IMDUR) 30 MG 24 hr tablet     levothyroxine (SYNTHROID/LEVOTHROID) 75 MCG tablet     magnesium oxide (MAG-OX) 400 MG tablet     nitroGLYcerin (NITROSTAT) 0.4 MG sublingual tablet     omeprazole (PRILOSEC) 20 MG DR capsule     potassium chloride ER (KLOR-CON M) 20 MEQ CR tablet     sacubitril-valsartan (ENTRESTO) 24-26 MG per tablet     senna-docusate (SENOKOT-S/PERICOLACE) 8.6-50 MG tablet     tamsulosin (FLOMAX) 0.4 MG capsule     warfarin ANTICOAGULANT (COUMADIN) 2 MG tablet     warfarin ANTICOAGULANT (COUMADIN) 4 MG tablet     zolpidem (AMBIEN) 5 MG  "tablet     No current facility-administered medications for this visit.     ROS:   Constitutional: No fever, chills, or sweats. .  ENT: No visual disturbance, ear ache, epistaxis, sore throat.   Allergies/Immunologic: Negative.   Respiratory: No cough, hemoptysis.   Cardiovascular: As per HPI.   GI: No nausea, vomiting, hematemesis, melena, or hematochezia.   : No urinary frequency, dysuria, or hematuria.   Integument: Negative.   Psychiatric: Pleasant, no major depression noted  Neuro: No focal neurological deficits noted  Endocrinology: Negative.   Musculoskeletal: As per HPI.      EXAM:  BP (!) 70/0 (BP Location: Right arm, Patient Position: Sitting, Cuff Size: Adult Regular)   Pulse 63   Ht 1.717 m (5' 7.6\")   Wt 86.8 kg (191 lb 4.8 oz)   SpO2 96%   BMI 29.43 kg/m    Constitutional: NAD  Neck: JVD to mid neck   cardiovascular: Continuous LVAD hum  Pulmonary: Lungs clear to auscultation bilaterally   Abdominal: Soft, NT, ND, +BS. Drive line dressing CDI  Musculoskeletal: No gross deformities   Extremities: Warm.  1+ edema   Skin: Pink, warm, dry  Neurological: A&O x3, grossly non-focal   Psych: pleasant     HM3 Parameters:  Speed: 5800 rpm / Lower speed limit: 5600  Flow: 4.9 LPM  Pulsatility Index: 3.5  Power: 5.2 muhammad  No low flow alarms, PI events noted     Labs:  Lab Results   Component Value Date    WBC 6.9 09/11/2022    HGB 14.2 09/11/2022    HCT 45.4 09/11/2022     09/11/2022     09/11/2022    POTASSIUM 4.9 09/11/2022    CHLORIDE 100 09/11/2022    CO2 29 09/11/2022    BUN 50.5 (H) 09/11/2022    CR 1.73 (H) 09/11/2022     (H) 09/11/2022    SED 72 (H) 02/16/2021    DD 4.12 (H) 05/03/2022    NTBNPI 6,484 (H) 09/02/2022    NTBNP 4,416 (H) 02/10/2022    TROPI 0.377 (HH) 02/15/2021    AST 46 (H) 05/03/2022    ALT 44 05/03/2022    ALKPHOS 151 (H) 05/03/2022    BILITOTAL 0.7 05/03/2022    INR 1.5 (L) 09/15/2022     Echo 2/22/2021: LVEF 15-20%; RV appears at least moderate to severely " reduced and grossly dilated; aortic valve opens intermittently   CHAMP 3/3/2021: LVEF 10-20%; moderate to severe RV dilation with severely reduced RV function; aortic valve opens partially with each beat, mild to moderate AI, mild to moderatd MR  Echo 2/10/2022: LVEF 10-15%, moderate RV dilated and moderately reduced function, aortic valve opens with each beat, trace AI, mild MR    [facility] 09/20/2022:  MAP 75  RA --/--/12  RV 42/11  PA 42/16/26  PCWP --/--/15  PA Sat 67%  Jane CO/CI 5.3/2.7   dynes  PVR 2.1 HUSAIN    [facility] 2/13/2022:  RA: 15  RV: 63/15  PA: 62/22 (35)  PCWP: 20  TD CI/CO: 2.59/5.39  Jane CI/CO: 2.17/4.53  PVR: 2.8 HUSAIN     [facility] 9/20/2022:  MAP 75  RA --/--/12  RV 42/11  PA 42/16/26  PCWP --/--/15  PA Sat 67%  Jane CO/CI 5.3/2.7   dynes  PVR 2.1 HUSAIN  CardioMems placement    ASSESSMENT AND PLAN:  In summary, patient is a 67 year old male with above past medical history, nonischemic cardiomyopathy status post HeartMate 3 implantation on 2/18/2020 as destination therapy who is here for follow-up. Overall has been doing well denies any new complaints or issues. He did have RHC with numbers as above, oiverall well diuresed and I think he is probably as dry as he will be. Will use these hemodynamics as baseline for him.     Labs today reviewed. Creatinine mildly above his baseline but acceptable. Will continue to follow closely. Blood pressure is acceptable today, will continue to monitor closely. No other medication changes needed at this time.     1. Chronic systolic heart failure. NICM. S/p HM3 implant on 2/18/2020 as DT  Device function: no PI events/power spikes. Current VAD parameters acceptable. Obtain LDH today.  Functional status: stable. He is currently NYHA FC III.   RV function: clinically stable, echo 2/2022 at  with moderate RV dilation and moderate dysfunction  Drive-line: Daily dressing changes and stable drainage per wife.   On chronic suppressive therapy with cefadroxil 500 mg BID.    Anticoagulation: INR managed by [facility] team. Goal INR 1.8-2.3. Therapeutic range decreased due to frequent nose bleeds.   Antiplatelet Therapy: Continue ASA 81 mg daily.   Beta-blocker: None due to history of shock.  ACEi: On lisinopril 10 mg daily, continue   Aldosterone antagonist: None. Contionue to hold given fluctuating creatinine  Vasodilators: On hydralazine 100 mg TID and Imdur 30 mg daily   Volume status: Appears minimally volume overloaded today.  Does have ongoing lower extremity edema.  As such we decided to continue Bumex 3 mg at this time and we will add HCTZ 50 mg for 3 days.  We will have labs checked next week as well.  Will not change baseline Bumex however in the future may add further doses of HCTZ as needed.  Device therapy: s/p ICD  Ischemic assessment: See #2    2. CAD. Mild to moderate CAD with severe branch disease per [facility] 11/2019. On asa and statin. No BB due to above. Continue medical management.     3. Hx of Afib w/ RVR and aberrancy vs. group home vs. AT vs. Slow VT. S/p DCCV on 2/28/21 back into sinus rhythm. Sinus bradycardia with increased RV pacing in 2/2022 so increased lower pacer rate from 40 to 60 and turned on rate response. Continued on amiodarone 200 mg daily. On warfarin.    4. HTN. As as noted above we will continue amlodipine 10 mg daily hydralazine 100 g 3 times daily and add long-acting isosorbide 30 mg once a day for blood pressure control.  Side effect discussed.  We will not add any ACE inhibitor RIP or Arni given persistent elevated creatinine and worsening renal function overall.  We will continue to monitor closely.    5. Chronic MSSA drive line infection. Continued on chronic suppressive therapy with cefadroxil 500 mg BID.  No changes to the    6. CKD, stage III. Over last ~6 months, creat has been running 1.3-1.8.  Follows with nephrology.  We will continue to monitor closely, creatinine as above  7. Anemia resolved  8. 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 On  coumadin. Plts stable.   9. IgG Kappa monoclonal gammopathy of undetermined significance. Followed by heme/onc  10. DM2. Per PCP.    Nader Cisneros MD

## 2022-09-20 NOTE — NURSING NOTE
MCS VAD Pump Info     Row Name 22 1637             MCS VAD Information    Implant LVAD      LVAD Pump HeartMate 3              Heartmate 2 (Left) VS    Flow (Lpm) --      Pulse Index (PI) --      Speed (rpm) --      Power (muhammad) --              Heartmate 3 LEFT VS    Flow (Lpm) 5.1 Lpm      Pulse Index (PI) 3.2 PI      Speed (rpm) 5800 rpm      Power (muhammad) 4.9 muhammad      Current Hct setting 40.2      Retired: Unexpected Alarms --              Primary Controller    Serial number [facility] [place] [facility] [id number]      Low flow alarm setting 2.5      High watt alarm setting --      EBB: Patient use 14      Replace in 29 Months              Backup Controller    Serial number [facility] [place] [id number]      EBB: Patient use --  . Replaced EBB  [id number]      Replace EBB in --      Speed & HCT match primary controller -- 8 batteries were issued to replace outdate 8 batteries. [id number] and [id number]             VAD Interrogation    Alarms reported by patient --      Unexpected alarms noted upon interrogation None      PI events Frequent      Damage to equipment is noted N      Action taken Reviewed proper equipment care and maintenance              Driveline Exit Site    Dressing change done Y  cultures sent      Driveline properly secured Yes      DLES assessment drainage;redness  greenish thick drainage      Dressing used Daily kit      Frequency patient changes dressing Every other day  They said they normally do it every day but this on had been two days since the last dsng change.      Dressing modifications --      Dressing change supplier --                    4)  Education Complete: Yes   Charge the BACKUP controller s backup battery every 6 months  Perform a self test on BACKUP every 6 months  Change the MPU s batteries every 6 months:Yes  Have equipment serviced yearly (if applicable):No

## 2022-09-20 NOTE — PROGRESS NOTES
ANTICOAGULATION MANAGEMENT     Eliseo Tanner 67 year old male is on warfarin with therapeutic INR result. (Goal INR 1.7-2.3)    Recent labs: (last 7 days)     09/20/22  0656   INR 1.72*       ASSESSMENT       Source(s): Patient/Caregiver Call       Warfarin doses taken: Warfarin taken as instructed    Diet: No new diet changes identified    New illness, injury, or hospitalization: Yes: patient had a right heart cath today    Medication/supplement changes: None noted    Signs or symptoms of bleeding or clotting: No    Previous INR: Subtherapeutic    Additional findings: None       PLAN     Recommended plan for no diet, medication or health factor changes affecting INR     Dosing Instructions: Continue your current warfarin dose with next INR in 1 week       Summary  As of 9/20/2022    Full warfarin instructions:  4 mg every Sun, Wed; 2 mg all other days   Next INR check:  9/27/2022             Telephone call with Eliseo and spouse Veronique who verbalizes understanding and agrees to plan and who agrees to plan and repeated back plan correctly    Patient to recheck with home meter    Education provided: None required    Plan made per [facility] anticoagulation protocol and per LVAD protocol    Luiza Perkins RN  Anticoagulation Clinic  9/20/2022    _______________________________________________________________________     Anticoagulation Episode Summary     Current INR goal:  1.7-2.3   TTR:  41.9 % (11.8 mo)   Target end date:  Indefinite   Send INR reminders to:  ANTICOAG LVAD    Indications    LVAD (left ventricular assist device) present (H) [Z95.811]  Chronic systolic congestive heart failure (H) [I50.22]  Paroxysmal atrial fibrillation (H) [I48.0]           Comments:  7/12/22 GOAL RANGE CHANGE 1.7-2.3   HM 3 placed 2/18/2020, 81mg ASA,  5/6/22 [employer] Home Meter         Anticoagulation Care Providers     Provider Role Specialty Phone number    Nader Cisneros MD Referring Cardiovascular Disease [phone]

## 2022-09-20 NOTE — PATIENT INSTRUCTIONS
Medications:  No med changes today    Instructions:  Please do a mems reading everyday      Follow-up: (make these appointments before you leave)  1. Please follow-up with VAD LISA in 6 months with labs prior.   2. Please follow-up with  in [place] in 3 months with labs prior.        Page the VAD Coordinator on call if you gain more than 3 lb in a day or 5 in a week. Please also page if you feel unwell or have alarms.   Great to see you in clinic today. To Page the VAD Coordinator on call, dial [phone] option #4 and ask to speak to the VAD coordinator on call.

## 2022-09-20 NOTE — PROGRESS NOTES
Pt admitted to 2A for a RHC and cordella placement,  Labs in [facility].  Emla applied to right neck.  Pt needs consent.  COVID negative this am.

## 2022-09-20 NOTE — PROGRESS NOTES
D: Stopped by to see patient. No VAD related questions or concerns at this time.   I: Discussed POC and provided support and listened to patient and caregiver's thoughts and concerns.  P: Continue to follow patient and address any questions or concerns patient and or caregiver may have.      Eliseo underwent a Right Heart Catheterization with CardioMEMS implant today.     Hemodynamics at time of implant:     C MEMS   Time 0922 / 0944 0946   PA Systolic 42         40 40   PA Diastolic 17         14 16   PA Mean 28         24 26   Wedge 15    Heart Rate 51         60 60       Antiplatelet/anticoagulation Post Implant Plan: Aspirin 81 mg daily, Warfarin as instructed by the coumadin clinic    Implanting Cardiologist: Felisha  Treating Cardiologist: Blayne  Treating HF LISA: Edward  Next scheduled CORE follow-up: October 18th, 2022

## 2022-09-20 NOTE — PROGRESS NOTES
D/I/A: Pt roomed on 3C in bay 32.  Arrived via litter and accompanied by CCL RN and On/Off: Off monitor.  VSSA.  Rhythm upon arrival SR on monitor.  Denies pain or sob.  Reviewed activity restrictions and when to notify RN, ie-changes to breathing or increased chest pressure or chest pain.  CCL access:  Right groin-perclosed-3 hour bedrest.  P: Continue to monitor status.  Discharge to home once meeting criteria.

## 2022-09-20 NOTE — PROGRESS NOTES
September 20, 2022     Eliseo Tanner is a 67 yr old male with a past medical history of chronic systolic heart failure, Stage D, Class III, nonischemic cardiomyopathy, now s/p HM3 implant on 2/18/2020, HTN, GERD, ABHINAV on CPAP, DM2 and CKD3. He presents for follow up. Last seen on 05/03/2022, at that time he was started on Imdur 30 mg once a day given ongoing persistent hypertension despite being on hydralazine 100 mg 3 times daily as well as amlodipine 10 mg daily.      Pertinent Hx:   - His HM3 post-op course was complicated by retrosternal hematoma and bleeding in the lungs, RV failure, VT and Aflutter w/RVR s/p DCCV. He had pre-op proteus and enterococcus bacteremia from 2/13/2020, s/p abx. He had an ICD placed on 3/16/2020 just before his discharge from the hospital. Readmitted in 11/2020 with MSSA bacteremia. Abdominal imaging (US and CT abd/pelvis) revealed no intraabdominal fluid collection concerning for abscess/infectious process. TTE did not comment on vegetation. CHAMP also did not show any signs of endocarditis. Last positive BCx on 11/14/20 showed MSSA, and abx were stopped on 12/27/20.     - Readmitted in 02/2021 for mixed cardiogenic and distributive shock with an intermittent wide complex tachycardia (thought to be 2/2 to 3:1 aflutter with aberrant conduction). 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 Initially required high doses of vasopressors and inotropes and was intubated for hypoxemia. LDH was severely elevated, but thought to be related to legionella rather than actual pump thrombus. Had acute liver injury with LFTs in the several thousands. Also had BERNARDA, which required CRRT. He was treated for legionella pneumonia and ultimately recovered relatively well and was discharged in stable condition not requiring dialysis (cession of HD 3/10/21).    - Was seen in the HF clinic at [place] in 04/2021, at which time he was feeling ok. Had some mild dizziness if changing positions too quickly. No changes  were made. He did reports mild brown discharge from the drive line site and increased nose bleeds. His INR goal was decreased to 1.8-2.3.     - In January of 2022 patient tested positive for Covid-19. In February he began feeling more fatigued, increased shortness of breath and reported chills. He underwent further work-up at the [facility] with right heart cath, echo and driveline cultures. Right heart cath revealed elevated filling pressures, echo stable from previous and cultures negative.    - He was admitted from 9/2/2022 - 9/11/2022 for acute heart failure not responsive to escalating PO diuretics. He presented with signs and sx hypervolemia, weight 218 lbs, ongoing edema, SOB with bibasilar crackles however no hypoxia. NT-BNP 6484 on admit, last 4416 in 2/2022. Moderate MR on Echo 9/3/22 and PCWP 20 mmHg on last RHC 2/10/22 consistent with cardiorenal syndrome. Diuresed with Bumex gtt and intermittent diuril and acetazolamide with good response. Net negative 31.1 L, weight at discharge 178 lb. LVAD speed was 5500 rpm on admission, in conversations with Dr. Cisneros, speed was increased to 5800 rpm during this hospitalization and tolerated well.  Resumed PTA Bumex 4 mg BID on discharge.   Today he is here for further evaluation after RHC and Mems placement. He is feeling much better after increasing the speed and swapping over to bumex. His symptoms have improved and overall feeling better. He is active, with class3 symptoms at this point. No LE edema, no bleeding, no stroke like symptoms, no LVAD alarms, falls.     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  SOCIAL HISTORY:  Social History      Socioeconomic History     Marital status:    Tobacco Use     Smoking status: Former Smoker     Quit date: 1994     Years since quittin.1     Smokeless tobacco: Never Used   Substance and Sexual Activity     Alcohol use: Not Currently     Drug use: Not Currently     CURRENT MEDICATIONS:  Current Outpatient Medications   Medication     allopurinol (ZYLOPRIM) 100 MG tablet     amiodarone (PACERONE) 200 MG tablet     amLODIPine (NORVASC) 5 MG tablet     aspirin (ASA) 81 MG EC tablet     atorvastatin (LIPITOR) 20 MG tablet     B Complex-C-Folic Acid (RENAL) 1 MG CAPS     bumetanide (BUMEX) 1 MG tablet     cefadroxil (DURICEF) 500 MG capsule     co-enzyme Q-10 200 MG CAPS     dapagliflozin (FARXIGA) 5 MG TABS tablet     finasteride (PROSCAR) 5 MG tablet     FLUoxetine (PROZAC) 10 MG capsule     hydrALAZINE (APRESOLINE) 100 MG tablet     hydrochlorothiazide (HYDRODIURIL) 50 MG tablet     insulin glargine (BASAGLAR KWIKPEN) 100 UNIT/ML pen     insulin pen needle (32G X 4 MM) 32G X 4 MM miscellaneous     isosorbide mononitrate (IMDUR) 30 MG 24 hr tablet     levothyroxine (SYNTHROID/LEVOTHROID) 75 MCG tablet     magnesium oxide (MAG-OX) 400 MG tablet     nitroGLYcerin (NITROSTAT) 0.4 MG sublingual tablet     omeprazole (PRILOSEC) 20 MG DR capsule     potassium chloride ER (KLOR-CON M) 20 MEQ CR tablet     sacubitril-valsartan (ENTRESTO) 24-26 MG per tablet     senna-docusate (SENOKOT-S/PERICOLACE) 8.6-50 MG tablet     tamsulosin (FLOMAX) 0.4 MG capsule     warfarin ANTICOAGULANT (COUMADIN) 2 MG tablet     warfarin ANTICOAGULANT (COUMADIN) 4 MG tablet     zolpidem (AMBIEN) 5 MG tablet     No current facility-administered medications for this visit.     ROS:   Constitutional: No fever, chills, or sweats. .  ENT: No visual disturbance, ear ache, epistaxis, sore throat.   Allergies/Immunologic: Negative.   Respiratory: No cough, hemoptysis.   Cardiovascular: As per HPI.   GI: No nausea, vomiting, hematemesis,  "melena, or hematochezia.   : No urinary frequency, dysuria, or hematuria.   Integument: Negative.   Psychiatric: Pleasant, no major depression noted  Neuro: No focal neurological deficits noted  Endocrinology: Negative.   Musculoskeletal: As per HPI.      EXAM:  BP (!) 70/0 (BP Location: Right arm, Patient Position: Sitting, Cuff Size: Adult Regular)   Pulse 63   Ht 1.717 m (5' 7.6\")   Wt 86.8 kg (191 lb 4.8 oz)   SpO2 96%   BMI 29.43 kg/m    Constitutional: NAD  Neck: JVD to mid neck   cardiovascular: Continuous LVAD hum  Pulmonary: Lungs clear to auscultation bilaterally   Abdominal: Soft, NT, ND, +BS. Drive line dressing CDI  Musculoskeletal: No gross deformities   Extremities: Warm.  1+ edema   Skin: Pink, warm, dry  Neurological: A&O x3, grossly non-focal   Psych: pleasant     HM3 Parameters:  Speed: 5800 rpm / Lower speed limit: 5600  Flow: 4.9 LPM  Pulsatility Index: 3.5  Power: 5.2 muhammad  No low flow alarms, PI events noted     Labs:  Lab Results   Component Value Date    WBC 6.9 09/11/2022    HGB 14.2 09/11/2022    HCT 45.4 09/11/2022     09/11/2022     09/11/2022    POTASSIUM 4.9 09/11/2022    CHLORIDE 100 09/11/2022    CO2 29 09/11/2022    BUN 50.5 (H) 09/11/2022    CR 1.73 (H) 09/11/2022     (H) 09/11/2022    SED 72 (H) 02/16/2021    DD 4.12 (H) 05/03/2022    NTBNPI 6,484 (H) 09/02/2022    NTBNP 4,416 (H) 02/10/2022    TROPI 0.377 (HH) 02/15/2021    AST 46 (H) 05/03/2022    ALT 44 05/03/2022    ALKPHOS 151 (H) 05/03/2022    BILITOTAL 0.7 05/03/2022    INR 1.5 (L) 09/15/2022     Echo 2/22/2021: LVEF 15-20%; RV appears at least moderate to severely reduced and grossly dilated; aortic valve opens intermittently   CHAMP 3/3/2021: LVEF 10-20%; moderate to severe RV dilation with severely reduced RV function; aortic valve opens partially with each beat, mild to moderate AI, mild to moderatd MR  Echo 2/10/2022: LVEF 10-15%, moderate RV dilated and moderately reduced function, aortic " valve opens with each beat, trace AI, mild MR    [facility] 09/20/2022:  MAP 75  RA --/--/12  RV 42/11  PA 42/16/26  PCWP --/--/15  PA Sat 67%  Jane CO/CI 5.3/2.7   dynes  PVR 2.1 HUSAIN    [facility] 2/13/2022:  RA: 15  RV: 63/15  PA: 62/22 (35)  PCWP: 20  TD CI/CO: 2.59/5.39  Jane CI/CO: 2.17/4.53  PVR: 2.8 HUSAIN     [facility] 9/20/2022:  MAP 75  RA --/--/12  RV 42/11  PA 42/16/26  PCWP --/--/15  PA Sat 67%  Jane CO/CI 5.3/2.7   dynes  PVR 2.1 HUSAIN  CardioMems placement    ASSESSMENT AND PLAN:  In summary, patient is a 67 year old male with above past medical history, nonischemic cardiomyopathy status post HeartMate 3 implantation on 2/18/2020 as destination therapy who is here for follow-up. Overall has been doing well denies any new complaints or issues. He did have RHC with numbers as above, oiverall well diuresed and I think he is probably as dry as he will be. Will use these hemodynamics as baseline for him.     Labs today reviewed. Creatinine mildly above his baseline but acceptable. Will continue to follow closely. Blood pressure is acceptable today, will continue to monitor closely. No other medication changes needed at this time.     1. Chronic systolic heart failure. NICM. S/p HM3 implant on 2/18/2020 as DT  Device function: no PI events/power spikes. Current VAD parameters acceptable. Obtain LDH today.  Functional status: stable. He is currently NYHA FC III.   RV function: clinically stable, echo 2/2022 at  with moderate RV dilation and moderate dysfunction  Drive-line: Daily dressing changes and stable drainage per wife.   On chronic suppressive therapy with cefadroxil 500 mg BID.   Anticoagulation: INR managed by [facility] team. Goal INR 1.8-2.3. Therapeutic range decreased due to frequent nose bleeds.   Antiplatelet Therapy: Continue ASA 81 mg daily.   Beta-blocker: None due to history of shock.  ACEi: On lisinopril 10 mg daily, continue   Aldosterone antagonist: None. Contionue to hold given fluctuating  creatinine  Vasodilators: On hydralazine 100 mg TID and Imdur 30 mg daily   Volume status: Appears minimally volume overloaded today.  Does have ongoing lower extremity edema.  As such we decided to continue Bumex 3 mg at this time and we will add HCTZ 50 mg for 3 days.  We will have labs checked next week as well.  Will not change baseline Bumex however in the future may add further doses of HCTZ as needed.  Device therapy: s/p ICD  Ischemic assessment: See #2    2. CAD. Mild to moderate CAD with severe branch disease per [facility] 11/2019. On asa and statin. No BB due to above. Continue medical management.     3. Hx of Afib w/ RVR and aberrancy vs. jail vs. AT vs. Slow VT. S/p DCCV on 2/28/21 back into sinus rhythm. Sinus bradycardia with increased RV pacing in 2/2022 so increased lower pacer rate from 40 to 60 and turned on rate response. Continued on amiodarone 200 mg daily. On warfarin.    4. HTN. As as noted above we will continue amlodipine 10 mg daily hydralazine 100 g 3 times daily and add long-acting isosorbide 30 mg once a day for blood pressure control.  Side effect discussed.  We will not add any ACE inhibitor RIP or Arni given persistent elevated creatinine and worsening renal function overall.  We will continue to monitor closely.    5. Chronic MSSA drive line infection. Continued on chronic suppressive therapy with cefadroxil 500 mg BID.  No changes to the    6. CKD, stage III. Over last ~6 months, creat has been running 1.3-1.8.  Follows with nephrology.  We will continue to monitor closely, creatinine as above  7. Anemia resolved  8. 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 On coumadin. Plts stable.   9. IgG Kappa monoclonal gammopathy of undetermined significance. Followed by heme/onc  10. DM2. Per PCP.    Nader Cisneros MD

## 2022-09-20 NOTE — PROGRESS NOTES
BP (!) 79/60   Pulse 60   Temp 97.7  F (36.5  C) (Oral)   Resp 20   Wt 81.6 kg (180 lb)   SpO2 95%   BMI 28.19 kg/m    Condition is stable s/p cardiomems device implant. Vital Signs are stable/WNL. Right groin site clean, dry and intact, cms intact.  Discharge instructions reviewed with patient and questions answered. Patient verbalizes understanding. IV removed. Pain under control. Patient is ambulating and voiding spontaneously. Patient is tolerating regular diet and denies any N/V. Patient to be discharged to clinic via wife. Patient has all belongings.

## 2022-09-20 NOTE — DISCHARGE INSTRUCTIONS
[facility]                        Interventional Cardiology  Discharge Instructions   CardioMEMS Device Implant      AFTER YOU GO HOME:  DO NOT drive or operate machinery at home or at work for at least 24 hours  If you were given sedation: we recommend that an adult stay with you for 24 hours  DO relax and take it easy for 24 hours  DO drink plenty of fluids  DO resume your regular diet, unless otherwise instructed by your Primary Physician  DO NOT SMOKE FOR AT LEAST 24 HOURS. If you have been given any mediations that were to help you relax or sedate you during your procedure  DO NOT drink alcoholic beverages the day of your procedure  DO NOT do any strenuous exercise or lifting (>10 lbs) for at least 7 days following your procedure  DO NOT take a tub bath, get in a hot tub, or pool for at least 5 days following your procedure  Do Not scrub the procedure site vigorously  DO NOT make any important or legal decisions for 24 hours following your procedure    CALL YOUR PRIMARY PHYSICIAN IF:  You start bleeding from the procedure site. If you do start to bleed from that site, lie down flat and hold pressure on the site. Call your physician, your physician will tell you if you need to return to the hospital. It is common to have a small bruise or lump at the site  You have numbness, coolness or change in color of the leg of the puncture site    You experience pain or redness at the puncture site    You develop hives or a rash or unexplained itching  You develop a temperature of 101 degrees F or greater    ADDITIONAL INSTRUCTIONS   Support the puncture site for coughing, sneezing, or moving your bowels for the first 48 hours  No lotion or powder to the puncture site for 3 days    [facility] INTERVENTIONAL CARDIOLOGY DEPARTMENT:    Procedure Physician:Dr. Baeza                                           Date of Procedure:September 20, 2022      Telephone Numbers [phone] Monday-Friday 8:00 am to 4:30  pm    [phone] After 4:30 pm Monday-Friday, Weekends & Holidays  Ask for Interventional Cardiologist on call. Someone is on call 24 hours/day   [facility] toll free number [phone] Monday-Friday 8:00 am to 4:30 pm   [facility] Emergency Dept [phone]

## 2022-09-20 NOTE — NURSING NOTE
Chief Complaint   Patient presents with     Follow Up     LVAD returned      Vitals were taken and medications were reconciled.   Trever Ashby, EMT  3:24 PM

## 2022-09-21 NOTE — PROGRESS NOTES
VAD Coordinator on-call received notification from Anticoagulation clinic that patient was feeling SOB and had weight gain.   Writer called patient to check in. Pt reported that his weight has been trending up the past few days. Weight today is 221lbs. Pt reports that his baseline weight is 215-216lbs. Pt reported that for the past three days, he has been taking an extra 40mg of lasix and 10meq of Kcl in the evenings. Pt endorsed SOB, cough, distended abdomen, and decreased appetite.  Reports VAD parameters stable (flow 4.5 lpm, PI 3.7, power 4.4). Discussed with STEW Turcios. Per Karolina, instructed patient to take an extra 20mg of lasix tonight and an extra 20mg of lasix tomorrow morning. Instructed pt to get labs checked tomorrow. Orders sent. Instructed pt to call VAD Coordinator on-call if SOB or other symptoms worsen; pt verbalized understanding.     3

## 2022-09-27 NOTE — LETTER
"9/27/2022       RE: Eliseo Tanner  [address] [place] [facility] [place] [address]     Dear Colleague,    Thank you for referring your patient, Eliseo Tanner, to the [facility] [place] at [facility]. Please see a copy of my visit note below.    Eliseo Tanner  is being evaluated via a billable video visit.      Patient declined individual allergy and medication review by support staff because they were last reviewed a week ago and per patient \"they have not changed in a week.\"     How would you like to obtain your AVS? MyChart  For the video visit, send the invitation by: Text to cell phone: [phone]  Will anyone else be joining your video visit? No      Video-Visit Details  Type of service:  Video Visit  Video Start Time: 12:31 PM  Video End Time:12:50 PM  Originating Location (pt. Location): Home  Distant Location (provider location):  [facility] [place]   Platform used for Video Visit: [facility]    Infectious Diseases LVAD Clinic Note  09/26/2022    Chief Complaint:  Chronic LVAD Infection    Recommendations     1. Change cefadroxil to cephalexin 500 mg four times a day to see if this helps to resolve drainage.  Creatinine clearance is 39 mL/min so no dosage adjustment required.   2. Return to clinic 2-3 weeks to assess if drainage has improved. Will attempt to coordinate with LVAD clinic.     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 Today he continues to have small amounts of drainage with intermittent erythema. He noticed increased drainage when " he switched from cephalexin to cefadroxil about 1 year ago. Prior to this he had no drainage.  There does not seem to be a definitive reason why cephalexin would work better since they are in the same class of antibiotics. Cephalexin is a little easier to dose based on his renal dysfunction.  Will attempt to switch back to cephalexin at a higher dose to reassess for improvement. He will be okay taking a medication four times daily.     LVAD History:  Current LVAD model: History of NICM s/p HM III  Date current LVAD placed: 2/18/20  Previous LVAD devices: none  Other prosthetic devices/materials:  ICD 3/16/20, Mems 8/2022  Primary cardiologist: Dr. Cisneros  Primary ID provider: [place]    History of bacteremias and driveline infections (dates and organisms):   - MSSA bacteremia secondary to MSSA driveline infection (11/2020).  Remains on cefadroxil or cephalexin for suppression.  The amount of drainage waxes and wanes (see above).     History of other pertinent infections:   -Severe Legionella pneumonia serogroup 1L severe Legionella pneumonia c/b cardiogenic shock, oliguric renal failure requiring CRRT and respiratory decompensation requiring intubation. Urine antigen positive, sputum PCR positive plus pulmonary opacities. s/p azithromycin (completed 2/25)     Current suppressive antibiotics:   -cefadroxil 500 mg BID    Transplant Plan: destination therapy     HPI:   Eliseo Tanner is a 66 year old male w/ chronic LVAD infection who presents for follow up. Last seen by me 2/10/22. He was admitted 9/2 to 9/11 for heart failure exacerbation. Had gained 43#. Mems placed as well.  Diuresis and VAD adjustments performed.  Remains on cefadroxil 500 mg po BID. He was seen by Dr. Cisneros on 9/20/22. On that day there had been an increased amount of drainage and slight erythema so a culture was obtained. The culture grew MSSA. Starting about 1 year the drainage started again. It has not stopped but there is variations in the  amount of drainage and can range around the size of a fifty cent piece or smaller. The area around the LVAD site will intermittent look red. His wife continues to changing his dressing daily. The erythema is now resolved.  No fevers, chills, night sweats.  Feels 100% better with diuresis and increased LVAD speed. His wife retired as an EMT and is still working as a cook.     ROS: Remaining ROS is negative except as noted above.  Patient Active Problem List   Diagnosis     Acute on chronic systolic congestive heart failure (H)     Anxiety     CKD (chronic kidney disease) stage 3, GFR 30-59 ml/min (H)     Coronary artery disease involving native coronary artery of native heart without angina pectoris     GERD (gastroesophageal reflux disease)     Gout     Hyperlipidemia with target LDL less than 100     Nonischemic cardiomyopathy (H)     Non-nephrotic range proteinuria     ABHINAV on CPAP     Type 2 diabetes mellitus with stage 3 chronic kidney disease, without long-term current use of insulin (H)     Heart failure (H)     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)     Cardiac arrest (H)     LVAD (left ventricular assist device) present (H)     Chronic systolic congestive heart failure (H)     CKD (chronic kidney disease) stage 4, GFR 15-29 ml/min (H)     Bacteremia     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)     Paroxysmal atrial fibrillation (H)     Wound infection     ACC/AHA stage D heart failure (H)     Stage 3b chronic kidney disease (H)     Mixed hyperlipidemia     Benign essential hypertension     Dizziness     Syncope     Tachyarrhythmia     Acute kidney injury (BERNARDA) with acute tubular necrosis (ATN) (H)     Iron deficiency anemia, unspecified iron deficiency anemia type     Hypervolemia, unspecified hypervolemia type       Allergies   Allergen Reactions     Heparin      HIT screen positive 2/14/20, reflex DAVINA negative; however heme recommended treating as if  positive  HIT screen negative 2/11/20     Oxycodone Itching and Other (See Comments)     Chlorhexidine Rash       Medications:  Current Outpatient Medications   Medication Sig Dispense Refill     allopurinol (ZYLOPRIM) 100 MG tablet 2 tablets (200 mg) by Oral or Feeding Tube route daily 60 tablet 1     amiodarone (PACERONE) 200 MG tablet TAKE 1 TABLET BY MOUTH ONCE DAILY 90 tablet 3     amLODIPine (NORVASC) 5 MG tablet Take 2 tablets (10 mg) by mouth daily 180 tablet 3     aspirin (ASA) 81 MG EC tablet Take 1 tablet (81 mg) by mouth daily 90 tablet 3     atorvastatin (LIPITOR) 20 MG tablet Take 1 tablet (20 mg) by mouth daily 90 tablet 3     B Complex-C-Folic Acid (RENAL) 1 MG CAPS Take 1 capsule by mouth daily 30 capsule 0     bumetanide (BUMEX) 1 MG tablet Take 4 tablets (4 mg) by mouth 2 times daily 720 tablet 3     cefadroxil (DURICEF) 500 MG capsule Take 1 capsule (500 mg) by mouth 2 times daily 60 capsule 3     co-enzyme Q-10 200 MG CAPS 200 mg by Oral or Feeding Tube route daily       dapagliflozin (FARXIGA) 5 MG TABS tablet Take 5 mg by mouth daily       finasteride (PROSCAR) 5 MG tablet Take 1 tablet (5 mg) by mouth daily Helps with urinary retention. 30 tablet 0     FLUoxetine (PROZAC) 10 MG capsule Take 30 mg by mouth daily       hydrALAZINE (APRESOLINE) 100 MG tablet Take 1 tablet (100 mg) by mouth 3 times daily 270 tablet 3     hydrochlorothiazide (HYDRODIURIL) 50 MG tablet Take 50mg (1 tab) every Wednesday & Saturday's (Patient taking differently: 50 mg Take 50mg (2x 25mg tablet) every Wednesday & Saturday) 50 tablet 3     insulin glargine (BASAGLAR KWIKPEN) 100 UNIT/ML pen Inject 10 Units Subcutaneous At Bedtime  (Patient not taking: Reported on 9/20/2022)       insulin pen needle (32G X 4 MM) 32G X 4 MM miscellaneous        isosorbide mononitrate (IMDUR) 30 MG 24 hr tablet Take 3 tablets (90 mg) by mouth daily 270 tablet 3     levothyroxine (SYNTHROID/LEVOTHROID) 75 MCG tablet Take 1 tablet (75 mcg)  by mouth every morning (before breakfast) 30 tablet 1     magnesium oxide (MAG-OX) 400 MG tablet 1 tablet (400 mg) by Oral or Feeding Tube route daily 30 tablet 1     nitroGLYcerin (NITROSTAT) 0.4 MG sublingual tablet For chest pain place 1 tablet under the tongue every 5 minutes for 3 doses. If symptoms persist 5 minutes after 1st dose call 911. 30 tablet 0     omeprazole (PRILOSEC) 20 MG DR capsule Take 20 mg by mouth daily       potassium chloride ER (KLOR-CON M) 20 MEQ CR tablet Take 3 tablets (60 mEq) by mouth 2 times daily (Patient taking differently: Take 60 mEq by mouth 2 times daily Takes 3 tablets (60 mEq) TID on Wednesdays and Saturdays together with hydrochlorothiazide; take 3 tablets (60 mEq) PO BID on all other days.) 540 tablet 3     sacubitril-valsartan (ENTRESTO) 24-26 MG per tablet Take 0.5 tablets by mouth 2 times daily 60 tablet 1     senna-docusate (SENOKOT-S/PERICOLACE) 8.6-50 MG tablet Take 1 tablet by mouth 2 times daily as needed for constipation        tamsulosin (FLOMAX) 0.4 MG capsule Take 2 capsules (0.8 mg) by mouth daily This med helps with urinary retention 30 capsule 0     warfarin ANTICOAGULANT (COUMADIN) 2 MG tablet Take by mouth daily Take 4 mg PO on Sundays and Wednesdays, and take 2 mg PO on all other days.       warfarin ANTICOAGULANT (COUMADIN) 4 MG tablet TAKE 1 TO 2 TABLETS (4 MG TO 8 MG) BY MOUTH ONCE DAILY AS DIRECTED 90 tablet 3     zolpidem (AMBIEN) 5 MG tablet Take 5 mg by mouth nightly as needed for Insomnia         Immunizations:  Immunization History   Administered Date(s) Administered     COVID-19,PF,Moderna 02/11/2021     COVID-19,PF,Pfizer (12+ Yrs) 02/11/2021, 09/01/2021, 09/22/2021     Flu, Unspecified 11/15/2019     Influenza (High Dose) 3 valent vaccine 10/25/2019     Pneumo Conj 13-V (2010&after) 09/06/2018       Exam:  There were no vitals taken for this visit.-video visit  GENERAL: Healthy, alert and no distress  EYES: Eyes grossly normal to  inspection  RESP: No audible wheeze, cough  SKIN: Visible skin clear. Unable to see exit vad site.   NEURO: Cranial nerves grossly intact.  Mentation and speech appropriate for age.  No recent pictures of LVAD exit site    Labs:  WBC   Date Value Ref Range Status   03/19/2021 7.9 10^9/L Final     WBC Count   Date Value Ref Range Status   09/20/2022 6.6 4.0 - 11.0 10e3/uL Final       CRP Inflammation   Date Value Ref Range Status   08/17/2021 4.9 0.0 - 8.0 mg/L Final   02/16/2021 270.0 (H) 0.0 - 8.0 mg/L Final   02/15/2021 270.0 (H) 0.0 - 8.0 mg/L Final   02/11/2021 8.6 0.0 - 10.0 mg/L Final       Creatinine   Date Value Ref Range Status   09/20/2022 2.14 (H) 0.67 - 1.17 mg/dL Final   09/11/2022 1.73 (H) 0.67 - 1.17 mg/dL Final   09/11/2022 1.59 (H) 0.67 - 1.17 mg/dL Final   04/09/2021 1.6 mg/dL Final   03/19/2021 2.1 mg/dL Final   03/17/2021 2.00 (H) 0.66 - 1.25 mg/dL Final

## 2022-09-27 NOTE — PROGRESS NOTES
"Eliseo Tanner  is being evaluated via a billable video visit.      Patient declined individual allergy and medication review by support staff because they were last reviewed a week ago and per patient \"they have not changed in a week.\"     How would you like to obtain your AVS? MyChart  For the video visit, send the invitation by: Text to cell phone: [phone]  Will anyone else be joining your video visit? No      Video-Visit Details  Type of service:  Video Visit  Video Start Time: 12:31 PM  Video End Time:12:50 PM  Originating Location (pt. Location): Home  Distant Location (provider location):  [facility] [place]   Platform used for Video Visit: [facility]    Infectious Diseases LVAD Clinic Note  09/26/2022    Chief Complaint:  Chronic LVAD Infection    Recommendations     1. Change cefadroxil to cephalexin 500 mg four times a day to see if this helps to resolve drainage.  Creatinine clearance is 39 mL/min so no dosage adjustment required.   2. Return to clinic 2-3 weeks to assess if drainage has improved. Will attempt to coordinate with LVAD clinic.     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 Today he continues to have small amounts of drainage with intermittent erythema. He noticed increased drainage when he switched from cephalexin to cefadroxil about 1 year ago. Prior to this he had no drainage.  There does not seem to be a definitive reason why cephalexin would work better since they are in the same class of antibiotics. Cephalexin is a little easier to dose based on his renal dysfunction.  Will attempt to " switch back to cephalexin at a higher dose to reassess for improvement. He will be okay taking a medication four times daily.     LVAD History:  Current LVAD model: History of NICM s/p HM III  Date current LVAD placed: 2/18/20  Previous LVAD devices: none  Other prosthetic devices/materials:  ICD 3/16/20, Mems 8/2022  Primary cardiologist: Dr. Cisneros  Primary ID provider: [place]    History of bacteremias and driveline infections (dates and organisms):   - MSSA bacteremia secondary to MSSA driveline infection (11/2020).  Remains on cefadroxil or cephalexin for suppression.  The amount of drainage waxes and wanes (see above).     History of other pertinent infections:   -Severe Legionella pneumonia serogroup 1L severe Legionella pneumonia c/b cardiogenic shock, oliguric renal failure requiring CRRT and respiratory decompensation requiring intubation. Urine antigen positive, sputum PCR positive plus pulmonary opacities. s/p azithromycin (completed 2/25)     Current suppressive antibiotics:   -cefadroxil 500 mg BID    Transplant Plan: destination therapy     HPI:   Eliseo Tanner is a 66 year old male w/ chronic LVAD infection who presents for follow up. Last seen by me 2/10/22. He was admitted 9/2 to 9/11 for heart failure exacerbation. Had gained 43#. Mems placed as well.  Diuresis and VAD adjustments performed.  Remains on cefadroxil 500 mg po BID. He was seen by Dr. Cisneros on 9/20/22. On that day there had been an increased amount of drainage and slight erythema so a culture was obtained. The culture grew MSSA. Starting about 1 year the drainage started again. It has not stopped but there is variations in the amount of drainage and can range around the size of a fifty cent piece or smaller. The area around the LVAD site will intermittent look red. His wife continues to changing his dressing daily. The erythema is now resolved.  No fevers, chills, night sweats.  Feels 100% better with diuresis and increased LVAD speed.  His wife retired as an EMT and is still working as a cook.     ROS: Remaining ROS is negative except as noted above.  Patient Active Problem List   Diagnosis     Acute on chronic systolic congestive heart failure (H)     Anxiety     CKD (chronic kidney disease) stage 3, GFR 30-59 ml/min (H)     Coronary artery disease involving native coronary artery of native heart without angina pectoris     GERD (gastroesophageal reflux disease)     Gout     Hyperlipidemia with target LDL less than 100     Nonischemic cardiomyopathy (H)     Non-nephrotic range proteinuria     ABHINAV on CPAP     Type 2 diabetes mellitus with stage 3 chronic kidney disease, without long-term current use of insulin (H)     Heart failure (H)     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)     Cardiac arrest (H)     LVAD (left ventricular assist device) present (H)     Chronic systolic congestive heart failure (H)     CKD (chronic kidney disease) stage 4, GFR 15-29 ml/min (H)     Bacteremia     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)     Paroxysmal atrial fibrillation (H)     Wound infection     ACC/AHA stage D heart failure (H)     Stage 3b chronic kidney disease (H)     Mixed hyperlipidemia     Benign essential hypertension     Dizziness     Syncope     Tachyarrhythmia     Acute kidney injury (BERNARDA) with acute tubular necrosis (ATN) (H)     Iron deficiency anemia, unspecified iron deficiency anemia type     Hypervolemia, unspecified hypervolemia type       Allergies   Allergen Reactions     Heparin      HIT screen positive 2/14/20, reflex DAVINA negative; however heme recommended treating as if positive  HIT screen negative 2/11/20     Oxycodone Itching and Other (See Comments)     Chlorhexidine Rash       Medications:  Current Outpatient Medications   Medication Sig Dispense Refill     allopurinol (ZYLOPRIM) 100 MG tablet 2 tablets (200 mg) by Oral or Feeding Tube route daily 60 tablet 1     amiodarone  (PACERONE) 200 MG tablet TAKE 1 TABLET BY MOUTH ONCE DAILY 90 tablet 3     amLODIPine (NORVASC) 5 MG tablet Take 2 tablets (10 mg) by mouth daily 180 tablet 3     aspirin (ASA) 81 MG EC tablet Take 1 tablet (81 mg) by mouth daily 90 tablet 3     atorvastatin (LIPITOR) 20 MG tablet Take 1 tablet (20 mg) by mouth daily 90 tablet 3     B Complex-C-Folic Acid (RENAL) 1 MG CAPS Take 1 capsule by mouth daily 30 capsule 0     bumetanide (BUMEX) 1 MG tablet Take 4 tablets (4 mg) by mouth 2 times daily 720 tablet 3     cefadroxil (DURICEF) 500 MG capsule Take 1 capsule (500 mg) by mouth 2 times daily 60 capsule 3     co-enzyme Q-10 200 MG CAPS 200 mg by Oral or Feeding Tube route daily       dapagliflozin (FARXIGA) 5 MG TABS tablet Take 5 mg by mouth daily       finasteride (PROSCAR) 5 MG tablet Take 1 tablet (5 mg) by mouth daily Helps with urinary retention. 30 tablet 0     FLUoxetine (PROZAC) 10 MG capsule Take 30 mg by mouth daily       hydrALAZINE (APRESOLINE) 100 MG tablet Take 1 tablet (100 mg) by mouth 3 times daily 270 tablet 3     hydrochlorothiazide (HYDRODIURIL) 50 MG tablet Take 50mg (1 tab) every Wednesday & Saturday's (Patient taking differently: 50 mg Take 50mg (2x 25mg tablet) every Wednesday & Saturday) 50 tablet 3     insulin glargine (BASAGLAR KWIKPEN) 100 UNIT/ML pen Inject 10 Units Subcutaneous At Bedtime  (Patient not taking: Reported on 9/20/2022)       insulin pen needle (32G X 4 MM) 32G X 4 MM miscellaneous        isosorbide mononitrate (IMDUR) 30 MG 24 hr tablet Take 3 tablets (90 mg) by mouth daily 270 tablet 3     levothyroxine (SYNTHROID/LEVOTHROID) 75 MCG tablet Take 1 tablet (75 mcg) by mouth every morning (before breakfast) 30 tablet 1     magnesium oxide (MAG-OX) 400 MG tablet 1 tablet (400 mg) by Oral or Feeding Tube route daily 30 tablet 1     nitroGLYcerin (NITROSTAT) 0.4 MG sublingual tablet For chest pain place 1 tablet under the tongue every 5 minutes for 3 doses. If symptoms persist 5  minutes after 1st dose call 911. 30 tablet 0     omeprazole (PRILOSEC) 20 MG DR capsule Take 20 mg by mouth daily       potassium chloride ER (KLOR-CON M) 20 MEQ CR tablet Take 3 tablets (60 mEq) by mouth 2 times daily (Patient taking differently: Take 60 mEq by mouth 2 times daily Takes 3 tablets (60 mEq) TID on Wednesdays and Saturdays together with hydrochlorothiazide; take 3 tablets (60 mEq) PO BID on all other days.) 540 tablet 3     sacubitril-valsartan (ENTRESTO) 24-26 MG per tablet Take 0.5 tablets by mouth 2 times daily 60 tablet 1     senna-docusate (SENOKOT-S/PERICOLACE) 8.6-50 MG tablet Take 1 tablet by mouth 2 times daily as needed for constipation        tamsulosin (FLOMAX) 0.4 MG capsule Take 2 capsules (0.8 mg) by mouth daily This med helps with urinary retention 30 capsule 0     warfarin ANTICOAGULANT (COUMADIN) 2 MG tablet Take by mouth daily Take 4 mg PO on Sundays and Wednesdays, and take 2 mg PO on all other days.       warfarin ANTICOAGULANT (COUMADIN) 4 MG tablet TAKE 1 TO 2 TABLETS (4 MG TO 8 MG) BY MOUTH ONCE DAILY AS DIRECTED 90 tablet 3     zolpidem (AMBIEN) 5 MG tablet Take 5 mg by mouth nightly as needed for Insomnia         Immunizations:  Immunization History   Administered Date(s) Administered     COVID-19,PF,Moderna 02/11/2021     COVID-19,PF,Pfizer (12+ Yrs) 02/11/2021, 09/01/2021, 09/22/2021     Flu, Unspecified 11/15/2019     Influenza (High Dose) 3 valent vaccine 10/25/2019     Pneumo Conj 13-V (2010&after) 09/06/2018       Exam:  There were no vitals taken for this visit.-video visit  GENERAL: Healthy, alert and no distress  EYES: Eyes grossly normal to inspection  RESP: No audible wheeze, cough  SKIN: Visible skin clear. Unable to see exit vad site.   NEURO: Cranial nerves grossly intact.  Mentation and speech appropriate for age.  No recent pictures of LVAD exit site    Labs:  WBC   Date Value Ref Range Status   03/19/2021 7.9 10^9/L Final     WBC Count   Date Value Ref Range  Status   09/20/2022 6.6 4.0 - 11.0 10e3/uL Final       CRP Inflammation   Date Value Ref Range Status   08/17/2021 4.9 0.0 - 8.0 mg/L Final   02/16/2021 270.0 (H) 0.0 - 8.0 mg/L Final   02/15/2021 270.0 (H) 0.0 - 8.0 mg/L Final   02/11/2021 8.6 0.0 - 10.0 mg/L Final       Creatinine   Date Value Ref Range Status   09/20/2022 2.14 (H) 0.67 - 1.17 mg/dL Final   09/11/2022 1.73 (H) 0.67 - 1.17 mg/dL Final   09/11/2022 1.59 (H) 0.67 - 1.17 mg/dL Final   04/09/2021 1.6 mg/dL Final   03/19/2021 2.1 mg/dL Final   03/17/2021 2.00 (H) 0.66 - 1.25 mg/dL Final

## 2022-09-27 NOTE — PATIENT INSTRUCTIONS
Stop cefadroxil  Start cephalexin 500 mg every 6 hours for 2-3 weeks and we can see if this improves the drainage and occasional erythema. Will attempt to coordinate with LVAD clinic.   I provided a prescription but you can also the cephalexin medication that you have at home  
Male

## 2022-09-27 NOTE — PROGRESS NOTES
[facility] - CardioMEMS Reading Review    September 27, 2022  CardioMems period: September 20 - October 19th, 2022      CardioMEMS reviewed and routed to patient's provider, Laine BARRY NP.     Current diuretic: Bumex 4 mg BID.  Hydrochlorothiazide 50 mg every Wednesday and Saturday     Current potassium chloride dose:  Potassium 60mEq BID w/ an extra 60 mEq Potassium on Wednesdays & Saturdays    Symptoms: None  Weights:  185lbs 9/27, 182.5lbs 9/26 & 180.7lbs 9/25       Changes to plan of care today: No change. Continue to trend    Current Threshold parameters: not defined as of yet    Today's Waveform:       Readings:           [facility] Date Wedge Pressure MEMS PAD during cath  (average of the 3 values)     Sept 20, 2022 w/MEMS implant     15 mmHg   16 mmHg

## 2022-09-27 NOTE — PROGRESS NOTES
CardioMems review        PAD on RHC was 17.  Per his home scale his weights are stable btwn 182-184.  He was 180 when he was discharged.  Current diuretics Bumex 4mg BID with HCTZ 50mg wednesdays and Saturdays.  Pt feeling very well.    Discussed with Dr. Cisneros and Laine ALEGRIA NP.  No changes to plan of care at this time.   Will follow up next week as previously planned.

## 2022-09-28 NOTE — TELEPHONE ENCOUNTER
Eliseo,     Our records show you were due to check your INR on 9/27/22.    Please check INR with your home meter and report results to the home monitoring company as soon as possible.       [facility]

## 2022-10-04 NOTE — PROGRESS NOTES
"[facility] CORE Clinic - CardioMEMS Reading Review    October 4, 2022, 1400   CardioMems period: 9/20 - 10/19      CardioMEMS reviewed and routed to patient's provider, Laine BARRY NP.     Current diuretic: Bumex 4 mg BID.  Hydrochlorothiazide 50 mg every Wednesday and Saturday      Current potassium chloride dose:  Potassium 60mEq BID w/ an extra 60 mEq Potassium on Wednesdays & Saturdays    Symptoms: pt is reporting symptoms of \"a popping feeling\" or skipped beats every few days. He has attempted to send in a transmission, but has been unsuccessful due to device issues.  Device team currently working on this with him.  Device check added to October appt's to evaluate.  Pt noticed that his PI went down to 1.4 during one of these & he had some transient, mild, light headedness as well.  Pt denies worsened fatigue, swelling and shortness of breath.  MAP stable at 72.    Weights: (starting with today, going back) 187, 185, 184, 184.5    Changes to plan of care today: INCREASE Hydrochlorothiazide to 75 mg twice weekly on Wednesday's and Saturday's.  Recheck BMP Monday.   Called pt to discuss.  Pt verbalized understanding of the instructions and will call prn.    Current Threshold parameters: still evaluating for threshold parameters    Today's Waveform:       Readings:         [facility] Date Wedge Pressure MEMS PAD during cath  (average of the 3 values)     Sept 20, 2022 w/MEMS implant     15 mmHg   16 mmHg         "

## 2022-10-05 NOTE — TELEPHONE ENCOUNTER
Bon called back to report that they tried to check an INR last night with the home INR monitor but error code was received. Patient has lab work on 10/10/22 and will have an INR done then.

## 2022-10-07 NOTE — PROGRESS NOTES
ANTICOAGULATION MANAGEMENT     Eliseo Tanner 67 year old male is on warfarin with subtherapeutic INR result. (Goal INR 1.7-2.3)    Recent labs: (last 7 days)     10/06/22  0000   INR 1.2*       ASSESSMENT       Source(s): Patient/Caregiver Call       Warfarin doses taken: Warfarin taken as instructed    Diet: Increased greens/vitamin K in diet; ongoing change    New illness, injury, or hospitalization: No    Medication/supplement changes: None noted    Signs or symptoms of bleeding or clotting: No    Previous INR: Therapeutic last 2(+) visits    Additional findings: None       PLAN     Recommended plan for ongoing change(s) affecting INR     Dosing Instructions: booster dose then Increase your warfarin dose (11.1% change) with next INR in 1 week       Summary  As of 10/7/2022    Full warfarin instructions:  10/7: 4 mg; Otherwise 4 mg every Mon, Wed, Sat; 2 mg all other days   Next INR check:  10/13/2022             Telephone call with Eliseo who verbalizes understanding and agrees to plan and who agrees to plan and repeated back plan correctly    Patient to recheck with home meter    Education provided: Importance of consistent vitamin K intake, Impact of vitamin K foods on INR and Vitamin K content of foods    Plan made per [facility] anticoagulation protocol and per LVAD protocol    Luiza Perkins RN  Anticoagulation Clinic  10/7/2022    _______________________________________________________________________     Anticoagulation Episode Summary     Current INR goal:  1.7-2.3   TTR:  42.0 % (11.7 mo)   Target end date:  Indefinite   Send INR reminders to:  ANTICOAG LVAD    Indications    LVAD (left ventricular assist device) present (H) [Z95.811]  Chronic systolic congestive heart failure (H) [I50.22]  Paroxysmal atrial fibrillation (H) [I48.0]           Comments:  7/12/22 GOAL RANGE CHANGE 1.7-2.3   HM 3 placed 2/18/2020, 81mg ASA,  5/6/22 [employer] Home Meter         Anticoagulation Care Providers     Provider Role Specialty  Phone number    Nader Cisneros MD Referring Cardiovascular Disease [phone]

## 2022-10-07 NOTE — PROGRESS NOTES
[facility] CORE Clinic - CardioMEMS Reading Review    October 7, 2022, 0921   CardioMems period: 9/20 - 10/19      CardioMEMS reviewed and routed to patient's provider, Laine BARRY NP.     Current diuretic: Bumex 4 mg BID.  Hydrochlorothiazide 75 mg every Wednesday and Saturday      Current potassium chloride dose:  Potassium 60mEq BID w/ an extra 60 mEq Potassium on Wednesdays & Saturdays    Symptoms: Pt continues to report intermittent fluttering.  Pt reports feeling well in spite of these episodes.  No other heart failure symptoms  Weights: 181 today, 183 yesterday, 184 the day prior    Changes to plan of care today: No changes    Current Threshold parameters:     Today's Waveform:       Readings:         [facility] Date Wedge Pressure MEMS PAD during cath  (average of the 3 values)     Sept 20, 2022 w/MEMS implant     15 mmHg   16 mmHg

## 2022-10-08 NOTE — TELEPHONE ENCOUNTER
S: Patient taking double dose of levothyroxine.  B:  Pt's wife called because they discovered this morning that he has been taking twice the prescribed amount of levothyroxine since his discharge from the hospital on 9/20/2022.  She reported that he already had a prescription for levothyroxine (75 mcg daily) in his medication box, and did not compare the meds he brought home at the time of discharge with those he already had.  (She noted that the new medication was levothyroxine + sodium).  This writer researched the half life of levothyroxine (4-6 weeks) and side effects of excess dosing.  He has experienced some lightheadedness and unsteadiness, but denied cold clammy skin, disorientation, tachycardia, or headache.  They asked if this could have impacted his INR to decrease., Micromedex reported minimal interaction.  This writer contacted poison control who recommended that the patient have his local provider check his thyroid labs, but that it was not urgent.  I communicated those recommendations to the patient and wife, and they reported they understood and would follow up with their local provider on Monday.  They also agreed to hold his dose of levothyroxine until after seeing his local provider.  A:  Medication error by patient due to oversight of recognizing that he had two bottles of the same medication.  R:  Follow up with local provider for thyroid labs and recommendations for proceeding with thyroid medication.

## 2022-10-10 NOTE — PROGRESS NOTES
[facility] CORE Clinic - CardioMEMS Reading Review    October 10, 2022, 0843   CardioMems period: 9/19-10/20      CardioMEMS reviewed and routed to patient's provider, Laine BARRY NP.     Current diuretic: Bumex 4 mg BID.  Hydrochlorothiazide 75 mg every Wednesday and Saturday      Current potassium chloride dose:  Potassium 60mEq BID w/ an extra 60 mEq Potassium on Wednesdays & Saturdays    Symptoms: None  Weights: 186 today, going back: 184, 183, 181  * Patient questionably forgot to take hydrochlorothiazide on Saturday    Changes to plan of care today: No changes.  Will continue to monitor since mems are trending downward.  Pt to call if weights increase again.    Current Threshold parameters:     Today's Waveform:       Readings:         [facility] Date Wedge Pressure MEMS PAD during cath  (average of the 3 values)     Sept 20, 2022 w/MEMS implant     15 mmHg   16 mmHg

## 2022-10-13 NOTE — PROGRESS NOTES
[facility] CORE Clinic - CardioMEMS Reading Review    October 13, 2022, 1400   CardioMems period: 09/19 - 10/20      CardioMEMS reviewed and routed to patient's provider, Laine BARRY NP.     Current diuretic: Bumex 4 mg BID.  Hydrochlorothiazide 75 mg every Wednesday and Saturday      Current potassium chloride dose:  Potassium 60mEq BID w/ an extra 60 mEq Potassium on Wednesdays & Saturdays    Symptoms: None  Weights: Today and going back: 181, 185, 187, 186      Changes to plan of care today: None    Current Threshold parameters: 19-22    Today's Waveform:       Readings:         [facility] Date Wedge Pressure MEMS PAD during cath  (average of the 3 values)     Sept 20, 2022 w/MEMS implant     15 mmHg   16 mmHg

## 2022-10-14 NOTE — TELEPHONE ENCOUNTER
Eliseo,     Our records show you were due to check your INR on 10/13/22.    Please schedule an appointment at your local lab as soon as possible. If you recently tested, but have not yet heard from us, please reply or give us a call at [phone] so we can work with you and your local lab to obtain the results.    [facility] Anticoagulation Management Program

## 2022-10-17 NOTE — PROGRESS NOTES
[facility] CORE Clinic - CardioMEMS Reading Review    October 17, 2022, 1331   CardioMems period: 09/20 - 10/19      CardioMEMS reviewed and routed to patient's provider, Laine BARRY NP.     Current diuretic: Bumex 4 mg BID.  Hydrochlorothiazide 75 mg every Wednesday and Saturday      Current potassium chloride dose:  Potassium 60mEq BID w/ an extra 60 mEq Potassium on Wednesdays & Saturdays    Symptoms: None  Weights: Today, going back: 184, 182, 182    Changes to plan of care today: None    Current Threshold parameters: 19-22    Today's Waveform:       Readings:         [facility] Date Wedge Pressure MEMS PAD during cath  (average of the 3 values)     Sept 20, 2022 w/MEMS implant     15 mmHg   16 mmHg

## 2022-10-18 NOTE — PROGRESS NOTES
"Infectious Diseases LVAD Clinic Note  10/17/2022    Chief Complaint:  Chronic LVAD Infection    Recommendations     1. See opat note below. Creatine clearance is ~ 45 mL/min.  2. Previously has used [facility]. Uncertain of infusion benefits. Will attempt for cefazolin 2 grams IV every 12 hours or 4 grams every 24 hours as a continuous infusion. If unable to do this due to insurance issues and needing an infusion center then will consider giving ceftriaxone.   3. Culture obtained  4. Follow up in LVAD clinic by virtual visit on 10/28/22  5. Recommended COVID bivalent booster and influenza but left before receiving    Outpatient Prolonged Parenteral/Oral Antibiotic Recommendations and ID Follow up    Infectious Diseases Indication: LVAD driveline infection    Antibiotic Information  Name of Antibiotic Dose of Antibiotic1 Anticipated duration   Cefazolin  2 grams IV q 12 hours or continuous infusion of 4 grams q 24 hours  2-4 weeks depending on improvement   Alternate regimen: Ceftriaxone 2 grams IV q 24 hours         1.Dose of antibiotic will need to be renally adjusted if creatinine clearance changes    Anticipated mode of antibiotic delivery: PICC line. Is a new line needed?: Yes  At the end of therapy can the line be removed?: Yes. Selecting \"yes\" will function as written order to remove line at the end of therapy.    Weekly labs required: CBC with diff, CMP and CRP  Responsible Provider: Magy    Timeframe that a clinic visit needs to be scheduled: 2-3 weeks. Type of ID Clinic Appointment Virtual Video visit. [place].    Is imaging needed before the next clinic visit: No. What imaging is needed? Yes: Not needed    ID provider to route this [facility] infectious disease adult [facility] only    [facility]/ID Clinic Information:  [address]  [place]  [address]  Phone: [phone]  Fax: [phone].  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 Today he continues to have small amounts of drainage with intermittent erythema. He noticed increased drainage when he switched from cephalexin to cefadroxil about 1 year ago. Prior to this he had no drainage.  There does not seem to be a definitive reason why cephalexin would work better since they are in the same class of antibiotics. Cephalexin is a little easier to dose based on his renal dysfunction. We attempted to switch 2 weeks ago and there was no change in difference. Examined exit site and dressing today. There appears to be a significant amount of green/yellow drainage saturating the dressing with some blood tinged drainage as well (see pics below). The drainage appeared more than previously described. The last visit was  virtual so I unable to visualize the dressing and exit site. At this point I would recommend trailing IV antibiotic to assess if we can decrease the burden of bacteria. MSSA is tetracycline resistant and currently creatinine more elevated so concerned about using tmp/smx. Follow up in 2-3 weeks in LVAD clinic.     LVAD History:  Current LVAD model: History of NICM s/p HM III  Date current LVAD placed: 2/18/20  Previous LVAD devices: none  Other prosthetic devices/materials:  ICD 3/16/20, Mems 8/2022  Primary cardiologist: Dr. Cisneros  Primary ID provider: Magy    History of bacteremias and driveline infections (dates and organisms):   - MSSA bacteremia secondary to MSSA driveline infection (11/2020).  Remains on cefadroxil or cephalexin for suppression.  The amount of  drainage waxes and wanes (see above).     History of other pertinent infections:   -Severe Legionella pneumonia serogroup 1L severe Legionella pneumonia c/b cardiogenic shock, oliguric renal failure requiring CRRT and respiratory decompensation requiring intubation. Urine antigen positive, sputum PCR positive plus pulmonary opacities. s/p azithromycin (completed 2/25)     Current suppressive antibiotics:   -cephalexin, recently on cefadroxil    Transplant Plan: destination therapy     HPI:   Eliseo Tanner is a 66 year old male w/ chronic LVAD infection who presents for follow up. Last seen by me 9/27/22. During our last visit we changed cefadroxil to cephalexin 500 mg four times a day to see if this helps to resolve drainage. Wife present who changes his dressings. Endorses that drainage amount waxes and wanes in the amount. Denies fevers, chills, erythema around the exit site. Changing the dressing daily. Okay to change dressing today.     Patient Active Problem List   Diagnosis     Acute on chronic systolic congestive heart failure (H)     Anxiety     CKD (chronic kidney disease) stage 3, GFR 30-59 ml/min (H)     Coronary artery disease involving native coronary artery of native heart without angina pectoris     GERD (gastroesophageal reflux disease)     Gout     Hyperlipidemia with target LDL less than 100     Nonischemic cardiomyopathy (H)     Non-nephrotic range proteinuria     ABHINAV on CPAP     Type 2 diabetes mellitus with stage 3 chronic kidney disease, without long-term current use of insulin (H)     Heart failure (H)     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)     Cardiac arrest (H)     LVAD (left ventricular assist device) present (H)     Chronic systolic congestive heart failure (H)     CKD (chronic kidney disease) stage 4, GFR 15-29 ml/min (H)     Bacteremia     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)     Paroxysmal atrial fibrillation (H)     Wound  infection     ACC/AHA stage D heart failure (H)     Stage 3b chronic kidney disease (H)     Mixed hyperlipidemia     Benign essential hypertension     Dizziness     Syncope     Tachyarrhythmia     Acute kidney injury (BERNARDA) with acute tubular necrosis (ATN) (H)     Iron deficiency anemia, unspecified iron deficiency anemia type     Hypervolemia, unspecified hypervolemia type       Allergies   Allergen Reactions     Heparin      HIT screen positive 2/14/20, reflex DAVINA negative; however heme recommended treating as if positive  HIT screen negative 2/11/20     Oxycodone Itching and Other (See Comments)     Chlorhexidine Rash       Medications:  Current Outpatient Medications   Medication Sig Dispense Refill     allopurinol (ZYLOPRIM) 100 MG tablet 2 tablets (200 mg) by Oral or Feeding Tube route daily 60 tablet 1     amiodarone (PACERONE) 200 MG tablet TAKE 1 TABLET BY MOUTH ONCE DAILY 90 tablet 3     amLODIPine (NORVASC) 5 MG tablet Take 2 tablets (10 mg) by mouth daily 180 tablet 3     aspirin (ASA) 81 MG EC tablet Take 1 tablet (81 mg) by mouth daily 90 tablet 3     atorvastatin (LIPITOR) 20 MG tablet Take 1 tablet (20 mg) by mouth daily 90 tablet 3     B Complex-C-Folic Acid (RENAL) 1 MG CAPS Take 1 capsule by mouth daily 30 capsule 0     bumetanide (BUMEX) 1 MG tablet Take 4 tablets (4 mg) by mouth 2 times daily 720 tablet 3     cephALEXin (KEFLEX) 500 MG capsule Take 1 capsule (500 mg) by mouth 4 times daily 120 capsule 0     co-enzyme Q-10 200 MG CAPS 200 mg by Oral or Feeding Tube route daily       dapagliflozin (FARXIGA) 5 MG TABS tablet Take 5 mg by mouth daily       finasteride (PROSCAR) 5 MG tablet Take 1 tablet (5 mg) by mouth daily Helps with urinary retention. 30 tablet 0     FLUoxetine (PROZAC) 10 MG capsule Take 30 mg by mouth daily       hydrALAZINE (APRESOLINE) 100 MG tablet Take 1 tablet (100 mg) by mouth 3 times daily 270 tablet 3     hydrochlorothiazide (HYDRODIURIL) 25 MG tablet Take 75 mg (3  tabs) every Wednesday & Saturday's 90 tablet 3     insulin glargine (BASAGLAR KWIKPEN) 100 UNIT/ML pen Inject 10 Units Subcutaneous At Bedtime  (Patient not taking: Reported on 9/20/2022)       insulin pen needle (32G X 4 MM) 32G X 4 MM miscellaneous        isosorbide mononitrate (IMDUR) 30 MG 24 hr tablet Take 3 tablets (90 mg) by mouth daily 270 tablet 3     levothyroxine (SYNTHROID/LEVOTHROID) 75 MCG tablet Take 1 tablet (75 mcg) by mouth every morning (before breakfast) 30 tablet 1     magnesium oxide (MAG-OX) 400 MG tablet 1 tablet (400 mg) by Oral or Feeding Tube route daily 30 tablet 1     nitroGLYcerin (NITROSTAT) 0.4 MG sublingual tablet For chest pain place 1 tablet under the tongue every 5 minutes for 3 doses. If symptoms persist 5 minutes after 1st dose call 911. 30 tablet 0     omeprazole (PRILOSEC) 20 MG DR capsule Take 20 mg by mouth daily       potassium chloride ER (KLOR-CON M) 20 MEQ CR tablet Take 3 tablets (60 mEq) by mouth 2 times daily (Patient taking differently: Take 60 mEq by mouth 2 times daily Takes 3 tablets (60 mEq) TID on Wednesdays and Saturdays together with hydrochlorothiazide; take 3 tablets (60 mEq) PO BID on all other days.) 540 tablet 3     sacubitril-valsartan (ENTRESTO) 24-26 MG per tablet Take 0.5 tablets by mouth 2 times daily 60 tablet 1     senna-docusate (SENOKOT-S/PERICOLACE) 8.6-50 MG tablet Take 1 tablet by mouth 2 times daily as needed for constipation        tamsulosin (FLOMAX) 0.4 MG capsule Take 2 capsules (0.8 mg) by mouth daily This med helps with urinary retention 30 capsule 0     warfarin ANTICOAGULANT (COUMADIN) 2 MG tablet Take by mouth daily Take 4 mg PO on Sundays and Wednesdays, and take 2 mg PO on all other days.       warfarin ANTICOAGULANT (COUMADIN) 4 MG tablet TAKE 1 TO 2 TABLETS (4 MG TO 8 MG) BY MOUTH ONCE DAILY AS DIRECTED 90 tablet 3     zolpidem (AMBIEN) 5 MG tablet Take 5 mg by mouth nightly as needed for Insomnia    "      Immunizations:  Immunization History   Administered Date(s) Administered     COVID-19,PF,Moderna 02/11/2021     COVID-19,PF,Pfizer (12+ Yrs) 02/11/2021, 09/01/2021, 09/22/2021     Flu, Unspecified 11/15/2019     Influenza (High Dose) 3 valent vaccine 10/25/2019     Pneumo Conj 13-V (2010&after) 09/06/2018       Exam:  BP (!) 70/50 (BP Location: Right arm, Patient Position: Sitting, Cuff Size: Adult Regular)   Pulse 71   Temp 98.1  F (36.7  C) (Oral)   Ht 1.717 m (5' 7.6\")   Wt 89 kg (196 lb 3.4 oz)   SpO2 98%   BMI 30.19 kg/m      GENERAL: Healthy, alert and no distress  EYES: Eyes grossly normal to inspection  RESP: No accessory muscle use. No audible wheeze, cough  SKIN: LVAD exit site w/o significant erythema.Noted some irritation around the exit site. Appear moist around the exit site. Bandage has significant yellow/green drainage the is saturated through. See picture below.   NEURO: Alert. Oriented. Mentation and speech appropriate for age.            Labs:  WBC   Date Value Ref Range Status   03/19/2021 7.9 10^9/L Final     WBC Count   Date Value Ref Range Status   09/20/2022 6.6 4.0 - 11.0 10e3/uL Final       CRP Inflammation   Date Value Ref Range Status   08/17/2021 4.9 0.0 - 8.0 mg/L Final   02/16/2021 270.0 (H) 0.0 - 8.0 mg/L Final   02/15/2021 270.0 (H) 0.0 - 8.0 mg/L Final   02/11/2021 8.6 0.0 - 10.0 mg/L Final       Creatinine   Date Value Ref Range Status   09/20/2022 2.14 (H) 0.67 - 1.17 mg/dL Final   09/11/2022 1.73 (H) 0.67 - 1.17 mg/dL Final   09/11/2022 1.59 (H) 0.67 - 1.17 mg/dL Final   04/09/2021 1.6 mg/dL Final   03/19/2021 2.1 mg/dL Final   03/17/2021 2.00 (H) 0.66 - 1.25 mg/dL Final     "

## 2022-10-18 NOTE — LETTER
"10/18/2022       RE: Eliseo Tanner  [address] [facility] [address]     Dear Colleague,    Thank you for referring your patient, Eliseo Tanner, to the [facility] INFECTIOUS DISEASE CLINIC [place] at [facility]. Please see a copy of my visit note below.    Infectious Diseases LVAD Clinic Note  10/17/2022    Chief Complaint:  Chronic LVAD Infection    Recommendations     1. See opat note below. Creatine clearance is ~ 45 mL/min.  2. Previously has used [facility]. Uncertain of infusion benefits. Will attempt for cefazolin 2 grams IV every 12 hours or 4 grams every 24 hours as a continuous infusion. If unable to do this due to insurance issues and needing an infusion center then will consider giving ceftriaxone.   3. Culture obtained  4. Follow up in LVAD clinic by virtual visit on 10/28/22  5. Recommended COVID bivalent booster and influenza but left before receiving    Outpatient Prolonged Parenteral/Oral Antibiotic Recommendations and ID Follow up    Infectious Diseases Indication: LVAD driveline infection    Antibiotic Information  Name of Antibiotic Dose of Antibiotic1 Anticipated duration   Cefazolin  2 grams IV q 12 hours or continuous infusion of 4 grams q 24 hours  2-4 weeks depending on improvement   Alternate regimen: Ceftriaxone 2 grams IV q 24 hours         1.Dose of antibiotic will need to be renally adjusted if creatinine clearance changes    Anticipated mode of antibiotic delivery: PICC line. Is a new line needed?: Yes  At the end of therapy can the line be removed?: Yes. Selecting \"yes\" will function as written order to remove line at the end of therapy.    Weekly labs required: CBC with diff, CMP and CRP  Responsible Provider: Magy    Timeframe that a clinic visit needs to be scheduled: 2-3 weeks. Type of ID Clinic Appointment Virtual Video visit. [place] area.    Is imaging needed before the next clinic visit: No. What imaging is " needed? Yes: Not needed    ID provider to route this [facility] infectious disease adult [facility] pool only    [facility] [address]/ID Clinic Information:  [address]  [place]  [address]  Phone: [phone]  Fax: [phone].  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 Today he continues to have small amounts of drainage with intermittent erythema. He noticed increased drainage when he switched from cephalexin to cefadroxil about 1 year ago. Prior to this he had no drainage.  There does not seem to be a definitive reason why cephalexin would work better since they are in the same class of antibiotics. Cephalexin is a little easier to dose based on his renal dysfunction. We attempted to switch 2 weeks ago and there was no change in difference. Examined exit site and dressing today. There appears to be a significant amount of green/yellow drainage saturating the dressing with some blood tinged drainage as well (see pics below). The drainage appeared more than previously described. The last visit was  virtual so I unable to visualize the dressing and exit site. At this point I would recommend trailing IV antibiotic to assess if we can decrease the burden of bacteria. MSSA is tetracycline resistant and currently creatinine more elevated so concerned about using tmp/smx. Follow up in 2-3 weeks in LVAD clinic.     LVAD History:  Current LVAD model: History of NICM s/p HM III  Date current LVAD placed: 2/18/20  Previous LVAD devices: none  Other prosthetic  devices/materials:  ICD 3/16/20, Mems 8/2022  Primary cardiologist: Dr. Cisneros  Primary ID provider: Magy    History of bacteremias and driveline infections (dates and organisms):   - MSSA bacteremia secondary to MSSA driveline infection (11/2020).  Remains on cefadroxil or cephalexin for suppression.  The amount of drainage waxes and wanes (see above).     History of other pertinent infections:   -Severe Legionella pneumonia serogroup 1L severe Legionella pneumonia c/b cardiogenic shock, oliguric renal failure requiring CRRT and respiratory decompensation requiring intubation. Urine antigen positive, sputum PCR positive plus pulmonary opacities. s/p azithromycin (completed 2/25)     Current suppressive antibiotics:   -cephalexin, recently on cefadroxil    Transplant Plan: destination therapy     HPI:   Eliseo Tanner is a 66 year old male w/ chronic LVAD infection who presents for follow up. Last seen by me 9/27/22. During our last visit we changed cefadroxil to cephalexin 500 mg four times a day to see if this helps to resolve drainage. Wife present who changes his dressings. Endorses that drainage amount waxes and wanes in the amount. Denies fevers, chills, erythema around the exit site. Changing the dressing daily. Okay to change dressing today.     Patient Active Problem List   Diagnosis     Acute on chronic systolic congestive heart failure (H)     Anxiety     CKD (chronic kidney disease) stage 3, GFR 30-59 ml/min (H)     Coronary artery disease involving native coronary artery of native heart without angina pectoris     GERD (gastroesophageal reflux disease)     Gout     Hyperlipidemia with target LDL less than 100     Nonischemic cardiomyopathy (H)     Non-nephrotic range proteinuria     ABHINAV on CPAP     Type 2 diabetes mellitus with stage 3 chronic kidney disease, without long-term current use of insulin (H)     Heart failure (H)     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)     Cardiac arrest  (H)     LVAD (left ventricular assist device) present (H)     Chronic systolic congestive heart failure (H)     CKD (chronic kidney disease) stage 4, GFR 15-29 ml/min (H)     Bacteremia     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)     Paroxysmal atrial fibrillation (H)     Wound infection     ACC/AHA stage D heart failure (H)     Stage 3b chronic kidney disease (H)     Mixed hyperlipidemia     Benign essential hypertension     Dizziness     Syncope     Tachyarrhythmia     Acute kidney injury (BERNARDA) with acute tubular necrosis (ATN) (H)     Iron deficiency anemia, unspecified iron deficiency anemia type     Hypervolemia, unspecified hypervolemia type       Allergies   Allergen Reactions     Heparin      HIT screen positive 2/14/20, reflex DAVINA negative; however heme recommended treating as if positive  HIT screen negative 2/11/20     Oxycodone Itching and Other (See Comments)     Chlorhexidine Rash       Medications:  Current Outpatient Medications   Medication Sig Dispense Refill     allopurinol (ZYLOPRIM) 100 MG tablet 2 tablets (200 mg) by Oral or Feeding Tube route daily 60 tablet 1     amiodarone (PACERONE) 200 MG tablet TAKE 1 TABLET BY MOUTH ONCE DAILY 90 tablet 3     amLODIPine (NORVASC) 5 MG tablet Take 2 tablets (10 mg) by mouth daily 180 tablet 3     aspirin (ASA) 81 MG EC tablet Take 1 tablet (81 mg) by mouth daily 90 tablet 3     atorvastatin (LIPITOR) 20 MG tablet Take 1 tablet (20 mg) by mouth daily 90 tablet 3     B Complex-C-Folic Acid (RENAL) 1 MG CAPS Take 1 capsule by mouth daily 30 capsule 0     bumetanide (BUMEX) 1 MG tablet Take 4 tablets (4 mg) by mouth 2 times daily 720 tablet 3     cephALEXin (KEFLEX) 500 MG capsule Take 1 capsule (500 mg) by mouth 4 times daily 120 capsule 0     co-enzyme Q-10 200 MG CAPS 200 mg by Oral or Feeding Tube route daily       dapagliflozin (FARXIGA) 5 MG TABS tablet Take 5 mg by mouth daily       finasteride (PROSCAR) 5 MG  tablet Take 1 tablet (5 mg) by mouth daily Helps with urinary retention. 30 tablet 0     FLUoxetine (PROZAC) 10 MG capsule Take 30 mg by mouth daily       hydrALAZINE (APRESOLINE) 100 MG tablet Take 1 tablet (100 mg) by mouth 3 times daily 270 tablet 3     hydrochlorothiazide (HYDRODIURIL) 25 MG tablet Take 75 mg (3 tabs) every Wednesday & Saturday's 90 tablet 3     insulin glargine (BASAGLAR KWIKPEN) 100 UNIT/ML pen Inject 10 Units Subcutaneous At Bedtime  (Patient not taking: Reported on 9/20/2022)       insulin pen needle (32G X 4 MM) 32G X 4 MM miscellaneous        isosorbide mononitrate (IMDUR) 30 MG 24 hr tablet Take 3 tablets (90 mg) by mouth daily 270 tablet 3     levothyroxine (SYNTHROID/LEVOTHROID) 75 MCG tablet Take 1 tablet (75 mcg) by mouth every morning (before breakfast) 30 tablet 1     magnesium oxide (MAG-OX) 400 MG tablet 1 tablet (400 mg) by Oral or Feeding Tube route daily 30 tablet 1     nitroGLYcerin (NITROSTAT) 0.4 MG sublingual tablet For chest pain place 1 tablet under the tongue every 5 minutes for 3 doses. If symptoms persist 5 minutes after 1st dose call 911. 30 tablet 0     omeprazole (PRILOSEC) 20 MG DR capsule Take 20 mg by mouth daily       potassium chloride ER (KLOR-CON M) 20 MEQ CR tablet Take 3 tablets (60 mEq) by mouth 2 times daily (Patient taking differently: Take 60 mEq by mouth 2 times daily Takes 3 tablets (60 mEq) TID on Wednesdays and Saturdays together with hydrochlorothiazide; take 3 tablets (60 mEq) PO BID on all other days.) 540 tablet 3     sacubitril-valsartan (ENTRESTO) 24-26 MG per tablet Take 0.5 tablets by mouth 2 times daily 60 tablet 1     senna-docusate (SENOKOT-S/PERICOLACE) 8.6-50 MG tablet Take 1 tablet by mouth 2 times daily as needed for constipation        tamsulosin (FLOMAX) 0.4 MG capsule Take 2 capsules (0.8 mg) by mouth daily This med helps with urinary retention 30 capsule 0     warfarin ANTICOAGULANT (COUMADIN) 2 MG tablet Take by mouth daily Take  "4 mg PO on Sundays and Wednesdays, and take 2 mg PO on all other days.       warfarin ANTICOAGULANT (COUMADIN) 4 MG tablet TAKE 1 TO 2 TABLETS (4 MG TO 8 MG) BY MOUTH ONCE DAILY AS DIRECTED 90 tablet 3     zolpidem (AMBIEN) 5 MG tablet Take 5 mg by mouth nightly as needed for Insomnia         Immunizations:  Immunization History   Administered Date(s) Administered     COVID-19,PF,Moderna 02/11/2021     COVID-19,PF,Pfizer (12+ Yrs) 02/11/2021, 09/01/2021, 09/22/2021     Flu, Unspecified 11/15/2019     Influenza (High Dose) 3 valent vaccine 10/25/2019     Pneumo Conj 13-V (2010&after) 09/06/2018       Exam:  BP (!) 70/50 (BP Location: Right arm, Patient Position: Sitting, Cuff Size: Adult Regular)   Pulse 71   Temp 98.1  F (36.7  C) (Oral)   Ht 1.717 m (5' 7.6\")   Wt 89 kg (196 lb 3.4 oz)   SpO2 98%   BMI 30.19 kg/m      GENERAL: Healthy, alert and no distress  EYES: Eyes grossly normal to inspection  RESP: No accessory muscle use. No audible wheeze, cough  SKIN: LVAD exit site w/o significant erythema.Noted some irritation around the exit site. Appear moist around the exit site. Bandage has significant yellow/green drainage the is saturated through. See picture below.   NEURO: Alert. Oriented. Mentation and speech appropriate for age.            Labs:  WBC   Date Value Ref Range Status   03/19/2021 7.9 10^9/L Final     WBC Count   Date Value Ref Range Status   09/20/2022 6.6 4.0 - 11.0 10e3/uL Final       CRP Inflammation   Date Value Ref Range Status   08/17/2021 4.9 0.0 - 8.0 mg/L Final   02/16/2021 270.0 (H) 0.0 - 8.0 mg/L Final   02/15/2021 270.0 (H) 0.0 - 8.0 mg/L Final   02/11/2021 8.6 0.0 - 10.0 mg/L Final       Creatinine   Date Value Ref Range Status   09/20/2022 2.14 (H) 0.67 - 1.17 mg/dL Final   09/11/2022 1.73 (H) 0.67 - 1.17 mg/dL Final   09/11/2022 1.59 (H) 0.67 - 1.17 mg/dL Final   04/09/2021 1.6 mg/dL Final   03/19/2021 2.1 mg/dL Final   03/17/2021 2.00 (H) 0.66 - 1.25 mg/dL Final     "

## 2022-10-18 NOTE — PROGRESS NOTES
ANTICOAGULATION MANAGEMENT     Eliseo Tanner 67 year old male is on warfarin with subtherapeutic INR result. (Goal INR 1.7-2.3)    Recent labs: (last 7 days)     10/18/22  1250   INR 1.39*       ASSESSMENT       Source(s): Patient/Caregiver Call       Warfarin doses taken: Warfarin taken as instructed    Diet: Increased greens/vitamin K in diet; ongoing change    New illness, injury, or hospitalization: No    Medication/supplement changes: None noted    Signs or symptoms of bleeding or clotting: No    Previous INR: Subtherapeutic    Additional findings: None       PLAN     Recommended plan for ongoing change(s) affecting INR     Dosing Instructions: booster dose then Increase your warfarin dose (10% change) with next INR in 6 days       Summary  As of 10/18/2022    Full warfarin instructions:  10/18: 4 mg; Otherwise 2 mg every Sun, Tue, Fri; 4 mg all other days   Next INR check:  10/24/2022             Telephone call with Eliseo who verbalizes understanding and agrees to plan and who agrees to plan and repeated back plan correctly    Patient to recheck with home meter    Education provided: None required    Plan made per [facility] anticoagulation protocol and per LVAD protocol    Luiza Perkins RN  Anticoagulation Clinic  10/18/2022    _______________________________________________________________________     Anticoagulation Episode Summary     Current INR goal:  1.7-2.3   TTR:  38.8 % (11.8 mo)   Target end date:  Indefinite   Send INR reminders to:  ANTICOAG LVAD    Indications    LVAD (left ventricular assist device) present (H) [Z95.811]  Chronic systolic congestive heart failure (H) [I50.22]  Paroxysmal atrial fibrillation (H) [I48.0]           Comments:  7/12/22 GOAL RANGE CHANGE 1.7-2.3   HM 3 placed 2/18/2020, 81mg ASA,  5/6/22 [employer] Home Meter         Anticoagulation Care Providers     Provider Role Specialty Phone number    Nader Cisneros MD Referring Cardiovascular Disease [phone]

## 2022-10-18 NOTE — NURSING NOTE
10/18/22 1400   [facility] VAD Information   Implant LVAD   LVAD Pump HeartMate 3   Heartmate 3 LEFT VS   Flow (Lpm) 5.2 Lpm   Pulse Index (PI) 3.4 PI   Speed (rpm) 5800 rpm   Power (muhammad) 4.9 muhammad   Current Hct setting 34  (adjusted today)   Primary Controller   Serial number [id number]   EBB: Patient use 15   Replace in 28 Months   Backup Controller   Serial number [id number]   EBB: Patient use 13   Replace EBB in 30 Months   VAD Interrogation   Alarms reported by patient N   Unexpected alarms noted upon interrogation None   PI events Frequent  (Range 1.5-4.2)   Damage to equipment is noted N   Action taken Reviewed proper equipment care and maintenance   Driveline Exit Site   Dressing change done N   Driveline properly secured Yes   DLES assessment other  (unable to view)   Frequency patient changes dressing Daily       4)  Education Complete: Yes   Charge the BACKUP controller s backup battery every 6 months  Perform a self test on BACKUP every 6 months  Change the MPU s batteries every 6 months:Yes  Have equipment serviced yearly (if applicable):Yes

## 2022-10-18 NOTE — NURSING NOTE
"Chief Complaint   Patient presents with     RECHECK     Follow-up     BP (!) 70/50 (BP Location: Right arm, Patient Position: Sitting, Cuff Size: Adult Regular)   Pulse 71   Temp 98.1  F (36.7  C) (Oral)   Ht 1.717 m (5' 7.6\")   Wt 89 kg (196 lb 3.4 oz)   SpO2 98%   BMI 30.19 kg/m      Aaliyah Salas on 10/18/2022 at 3:03 PM    "

## 2022-10-18 NOTE — PATIENT INSTRUCTIONS
It was a pleasure to see you in clinic today.  Please do not hesitate to call with any questions or concerns.  You are scheduled for a remote transmission on 1/18/23.  We look forward to seeing you in clinic at your next device check in 6 months.    Anila Wang, RN, MS, CCRN  Electrophysiology Nurse Clinician  [facility]    During Business Hours Please Call:  [phone]  After Hours Please Call:  [phone] - select option #4 and ask for job code [phone]

## 2022-10-18 NOTE — PATIENT INSTRUCTIONS
Medications:  INCREASE Bumex to 5 mg twice a day  INCREASE Potassium to 80 mEq in the morning and 60 mEq in the PM (No change to the extra dose on Wednesdays and Saturdays)    Instructions:  Keep up the great work!   BMP next week, Wednesday (can be Tuesday or Thursday too)    Follow-up:   Follow up as previously scheduled.        Page the VAD Coordinator on call if you gain more than 3 lb in a day or 5 in a week. Please also page if you feel unwell or have alarms.   Great to see you in clinic today. To Page the VAD Coordinator on call, dial [phone] option #[phone] and ask to speak to the VAD coordinator on call.

## 2022-10-18 NOTE — PATIENT INSTRUCTIONS
Continue cepalexin for now  We will look into starting you on an IV antibiotic. I have submitted the request to start this process.   We can try IV antibiotics to see if we can knock down the drainage.

## 2022-10-18 NOTE — NURSING NOTE
Chief Complaint   Patient presents with     Follow Up     VAD return with labs prior and ID after     Vitals were taken and medications reconciled.    Gordy Meehan, EMT  2:04 PM   Spoke with [facility] lab, the INR order was overlooked, therefore not done. Patient advised, she will come here this afternoon to have INR done. Dr. Arely Davidson aware.

## 2022-10-18 NOTE — LETTER
10/18/2022      RE: Eliseo Tanner  [address] [place] [address]       Dear Colleague,    Thank you for the opportunity to participate in the care of your patient, Eliseo Tanner, at the [facility] [place] at [facility]. Please see a copy of my visit note below.    In person visit    HPI:   Eliseo Tanner is a 67 year old male with chronic systolic heart failure secondary to NICM now s/p HM 3 on 2/18, moderate CAD, HTN, ABHINAV on CPAP, DM2, CKD Stage III, ANA. His HM3 post-op course was complicated by retrosternal hematoma and bleeding in the lungs, RV failure,VT in ICU now on amiodarone and Afib w/AVR S/p DCCV on 2/28. He had pre-op proteus and enterococcus bacteremia and he has had MSSA bacteremia as well, s/p abx; later on had LDH elevation 2/2 to legionella c/b renal failure requiring temporary dialysis (off iHD since 3/10/2021).  He presents today for LVAD follow-up.    He was last seen in LVAD clinic by Dr. Cisneros 9/2022. Speed had recently been increased from 5500 to 5800 and he had been switched over to bumex. He had also had a recent cardiomems placed.    This visit  No SOB at rest. He can walks a few blocks before MIRANDA, before his speed was increased he couldn't even walk half a block. No orthopnea. No PND. No LE edema. No abdominal edema. No lightheadedness, dizziness, pre-syncope or syncope. He was having some palpitations when he was taking double his thyroid medicaiton, they have resolved since stopping that. He is working with his PCP and endcrine doctor about how to take this medication in the future.No chest pain.    Weights 181-184.    No blood in the urine or blood in the stool. No prolonged nosebleeds.    No headaches. No stroke symptoms.    He has chronic driveline drainage- this is the same. He has waxing and waning skin redness. No pain. No fevers or chills.    No LVAD alarms.    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    SOCIAL HISTORY:  Social History     Socioeconomic History     Marital status:      Spouse name: Not on file     Number of children: Not on file     Years of education: Not on file     Highest education level: Not on file   Occupational History     Not on file   Social Needs     Financial resource strain: Not on file     Food insecurity     Worry: Not on file     Inability: Not on file     Transportation needs     Medical: Not on file     Non-medical: Not on file   Tobacco Use     Smoking status: Not on file   Substance and Sexual Activity     Alcohol use: Not on file     Drug use: Not on file     Sexual activity: Not on file   Lifestyle     Physical activity     Days per week: Not on file     Minutes per session: Not on file     Stress: Not on file   Relationships     Social connections     Talks on phone: Not on file     Gets together: Not on file     Attends Nondenominational service: Not on file     Active member of club or organization: Not on file     Attends meetings of clubs or organizations: Not on file     Relationship status: Not on file     Intimate partner violence     Fear of current or ex partner: Not on file     Emotionally abused: Not on file     Physically abused:  Not on file     Forced sexual activity: Not on file   Other Topics Concern     Not on file   Social History Narrative     Not on file       CURRENT MEDICATIONS:  allopurinol (ZYLOPRIM) 100 MG tablet, 2 tablets (200 mg) by Oral or Feeding Tube route daily  amiodarone (PACERONE) 200 MG tablet, TAKE 1 TABLET BY MOUTH ONCE DAILY  amLODIPine (NORVASC) 5 MG tablet, Take 2 tablets (10 mg) by mouth daily  aspirin (ASA) 81 MG EC tablet, Take 1 tablet (81 mg) by mouth daily  atorvastatin (LIPITOR) 20 MG tablet, Take 1 tablet (20 mg) by mouth daily  B Complex-C-Folic Acid (RENAL) 1 MG CAPS, Take 1 capsule by mouth daily  cephALEXin (KEFLEX) 500 MG capsule, Take 1 capsule (500 mg) by mouth 4 times daily  co-enzyme Q-10 200 MG CAPS, 200 mg by Oral or Feeding Tube route daily  dapagliflozin (FARXIGA) 5 MG TABS tablet, Take 5 mg by mouth daily  finasteride (PROSCAR) 5 MG tablet, Take 1 tablet (5 mg) by mouth daily Helps with urinary retention.  FLUoxetine (PROZAC) 10 MG capsule, Take 30 mg by mouth daily  hydrALAZINE (APRESOLINE) 100 MG tablet, Take 1 tablet (100 mg) by mouth 3 times daily  hydrochlorothiazide (HYDRODIURIL) 25 MG tablet, Take 75 mg (3 tabs) every Wednesday & Saturday's  insulin pen needle (32G X 4 MM) 32G X 4 MM miscellaneous,   isosorbide mononitrate (IMDUR) 30 MG 24 hr tablet, Take 3 tablets (90 mg) by mouth daily  levothyroxine (SYNTHROID/LEVOTHROID) 75 MCG tablet, Take 1 tablet (75 mcg) by mouth every morning (before breakfast)  magnesium oxide (MAG-OX) 400 MG tablet, 1 tablet (400 mg) by Oral or Feeding Tube route daily  nitroGLYcerin (NITROSTAT) 0.4 MG sublingual tablet, For chest pain place 1 tablet under the tongue every 5 minutes for 3 doses. If symptoms persist 5 minutes after 1st dose call 911.  omeprazole (PRILOSEC) 20 MG DR capsule, Take 20 mg by mouth daily  sacubitril-valsartan (ENTRESTO) 24-26 MG per tablet, Take 0.5 tablets by mouth 2 times daily  senna-docusate (SENOKOT-S/PERICOLACE) 8.6-50 MG  tablet, Take 1 tablet by mouth 2 times daily as needed for constipation   tamsulosin (FLOMAX) 0.4 MG capsule, Take 2 capsules (0.8 mg) by mouth daily This med helps with urinary retention  warfarin ANTICOAGULANT (COUMADIN) 2 MG tablet, Take by mouth daily Take 4 mg PO on Sundays and Wednesdays, and take 2 mg PO on all other days.  warfarin ANTICOAGULANT (COUMADIN) 4 MG tablet, TAKE 1 TO 2 TABLETS (4 MG TO 8 MG) BY MOUTH ONCE DAILY AS DIRECTED  zolpidem (AMBIEN) 5 MG tablet, Take 5 mg by mouth nightly as needed for Insomnia  insulin glargine (BASAGLAR KWIKPEN) 100 UNIT/ML pen, Inject 10 Units Subcutaneous At Bedtime  (Patient not taking: No sig reported)    No current facility-administered medications on file prior to visit.      ROS:   See HPI    EXAM:  BP (!) 80/0 (BP Location: Right arm, Patient Position: Chair, Cuff Size: Adult Regular)   Pulse 71   Wt 89 kg (196 lb 1.6 oz)   SpO2 98%   BMI 30.17 kg/m      GENERAL: Appears comfortable, in no acute distress.   HEENT: Eye symmetrical, no discharge or icterus bilaterally. Mucous membranes moist and without lesions.  CV: Hum of HM3, occasional S1S2. JVP ~11.   RESPIRATORY: Respirations regular, even, and unlabored. Lungs CTA throughout.   GI: Soft, non distended with normoactive bowel sounds. No tenderness, rebound, guarding.   EXTREMITIES: Mild to b/l peripheral edema. All extremities are warm and well perfused  NEUROLOGIC: Alert and interacting appropriately. No focal deficits.   MUSCULOSKELETAL: No joint swelling or tenderness.   SKIN: No jaundice. No rashes or lesions. Driveline dressing c/d/i.      Labs - as reviewed in clinic with patient today:  CBC RESULTS:  Lab Results   Component Value Date    WBC 6.9 10/18/2022    WBC 7.9 03/19/2021    RBC 4.15 (L) 10/18/2022    RBC 3.88 03/19/2021    HGB 12.3 (L) 10/18/2022    HGB 10.1 (A) 03/19/2021    HCT 37.4 (L) 10/18/2022    HCT 32.2 03/19/2021    MCV 90 10/18/2022    MCV 83.0 03/19/2021    MCH 29.6 10/18/2022     MCH 26.0 03/19/2021    MCHC 32.9 10/18/2022    MCHC 31.4 03/19/2021    RDW 18.9 (H) 10/18/2022    RDW 22.8 03/19/2021     10/18/2022     03/19/2021     03/17/2021       CMP RESULTS:  Lab Results   Component Value Date     10/18/2022     04/09/2021    POTASSIUM 4.3 10/18/2022    POTASSIUM 4.2 05/03/2022    POTASSIUM 4.2 04/09/2021    CHLORIDE 100 10/18/2022    CHLORIDE 98 08/25/2022    CHLORIDE 103 04/09/2021    CO2 28 10/18/2022    CO2 26 05/03/2022    CO2 28 04/09/2021    ANIONGAP 10 10/18/2022    ANIONGAP 11 05/03/2022    ANIONGAP 8 04/09/2021    GLC 98 10/18/2022     (H) 09/20/2022     (H) 05/03/2022     (A) 04/09/2021    BUN 63.3 (H) 10/18/2022    BUN 46 (H) 05/03/2022    BUN 35 04/09/2021    CR 1.96 (H) 10/18/2022    CR 1.6 04/09/2021    GFRESTIMATED 36 (L) 10/18/2022    GFRESTIMATED 34 03/19/2021    GFRESTBLACK 39 (L) 03/17/2021    CALOS 9.1 10/18/2022    CALOS 9.0 04/09/2021    BILITOTAL 0.5 10/18/2022    BILITOTAL 0.5 03/19/2021    ALBUMIN 4.0 10/18/2022    ALBUMIN 3.7 05/03/2022    ALBUMIN 4.4 03/19/2021    ALKPHOS 175 (H) 10/18/2022    ALKPHOS 138.0 03/19/2021    ALT 88 (H) 10/18/2022    ALT 35 03/19/2021    AST 93 (H) 10/18/2022    AST 60.0 03/19/2021        INR RESULTS:  Lab Results   Component Value Date    INR 1.39 (H) 10/18/2022    INR 1.2 (L) 10/06/2022    INR 3.1 (A) 07/09/2021       Lab Results   Component Value Date    MAG 2.2 09/11/2022    MAG 1.5 (L) 03/17/2021     Lab Results   Component Value Date    NTBNPI 6,484 (H) 09/02/2022    NTBNPI 27,781 (H) 02/15/2021     Lab Results   Component Value Date    NTBNP 4,416 (H) 02/10/2022       Diagnostics    1/7/2021 RHC   Systolic (mmHg) Diastolic (mmHg) Mean (mmHg) A Wave (mmHg) V Wave (mmHg) EDP (mmHg) Max dp/dt (mmHg/sec) HR (bpm) Content (mL/dL) SAT (%)    RA Pressures  1:03 PM   5    4    9      84        RV Pressures  1:03 PM 24        5     84        PA Pressures  1:04 PM 24    7    13        88         PCW Pressures  1:04 PM   3        55          1/5/2021 ECHO    Interpretation Summary  Heartmate III LVAD, speed 5500 RPM     Left ventricular size is normal. Severely (EF 10-15%) reduced left ventricular  function is present.     LVAD cannula is not well seen in the LV apex. The outflow graft is well  visualized and Doppler is normal .     Mild to moderate right ventricular dilation is present. Global right  ventricular function is mildly to moderately reduced.     Aortic valve opens with every heart beat.     The inferior vena cava was normal in size with preserved respiratory  variability.     No pericardial effusion is present.      Assessment and Plan:   Eliseo Tanner is a 67 year old male with chronic systolic heart failure secondary to NICM now s/p HM 3 on 2/18, moderate CAD, HTN, ABHINAV on CPAP, DM2, CKD Stage III, ANA. His HM3 post-op course was complicated by retrosternal hematoma and bleeding in the lungs, RV failure,VT in ICU now on amiodarone and Afib w/AVR S/p DCCV on 2/28. He had pre-op proteus and enterococcus bacteremia and he has had MSSA bacteremia as well, s/p abx; later on had LDH elevation 2/2 to legionella c/b renal failure requiring temporary dialysis (off iHD since 3/10/2021).  He presents today for LVAD follow-up.    He is feeling much better sine his speed increase and cardiomems. He still looks very mildly hypervolemic today. His renal function is improved, so we will take today to challenge his volume status. If he doesn't tolerate this, we will need to consider adjusting his Pad to a slightly higher range.        Chronic SCHF secondary to NICM s/p HM III with RV dysfunction. Implanted 2/18 and complicated by bleeding.   Stage D, NYHA Class IIIA  ACEi/ARB Hydralazine 100 mg TID. Continue amlodipine to 10 mg daily  BB Has been deferred given RHF, deferred d/t bradycardia now  Aldosterone antagonist deferred while other medical therapy is prioritized  SCD prophylaxis ICD  Fluid  status Hypervolemic: Increase bumex to 5 mg BID Kcl and kcl to 80/60  MAP: Goal 65-85, he is-80 today  LDH: 283, stable  Anticoagulation: Coumadin per pharmacy.  Goal 1.8-2.5 for recurrent nosebleeding, INR 1.39- defer bridge, dosing per ACC team  Antiplatelet: ASA 81 mg po daily  Cardiomems goal: Early since implant, working on refining his goal currently    VAD Interrogation on October 20, 2022. VAD interrogation reviewed with VAD coordinator. Agree with findings. Frequent PI events. No power spikes, speed drops, or other findings suspicious of pump malfunction noted.    CKD stage IV  Baseline has increased over the last year. Current B/l is around 1.8-2.2.   - Improved today to 1.96 (F2.4)  - Diuretic management as above  - BMP next week (increased bumex, refining cardiomems goal)    MSSA Driveline infection, ongoing drainage  - Patient saw Dr. Bhatti today, management per her and the ID team  - On Keflex for now     A. Fib.  Sinus Bradycardia   History of Afib w/ RVR and aberrancy vs. NATHALIA vs. AT vs. Slow VT. S/p DCCV on 2/28 back into sinus rhythm. Has been tachycardic in the past but now in sinus bradycardia with increased RV pacing.  - Continue on amiodarone  - Increased lower pacer rate from 40 to 60 and turned on rate response at a prior appointment    HTN, within goal today  - Continue hydralazine and amlodipine    History of HIT  - Avoid heparin products     Follow up   - BMP in 1 week (incraeed bumex and kcl, refining pad goal)  - Dr. Cisneros 12/7 as scheduled    Billing  - I reviewed 3+ tests  - I managed 2 stable chronic problems  - I changed a prescription medication    BIJAL Padron TAMAS

## 2022-10-18 NOTE — PROGRESS NOTES
In person visit    HPI:   Eliseo Tanner is a 67 year old male with chronic systolic heart failure secondary to NICM now s/p HM 3 on 2/18, moderate CAD, HTN, ABHINAV on CPAP, DM2, CKD Stage III, ANA. His HM3 post-op course was complicated by retrosternal hematoma and bleeding in the lungs, RV failure,VT in ICU now on amiodarone and Afib w/AVR S/p DCCV on 2/28. He had pre-op proteus and enterococcus bacteremia and he has had MSSA bacteremia as well, s/p abx; later on had LDH elevation 2/2 to legionella c/b renal failure requiring temporary dialysis (off iHD since 3/10/2021).  He presents today for LVAD follow-up.    He was last seen in LVAD clinic by Dr. Cisneros 9/2022. Speed had recently been increased from 5500 to 5800 and he had been switched over to bumex. He had also had a recent cardiomems placed.    This visit  No SOB at rest. He can walks a few blocks before MIRANDA, before his speed was increased he couldn't even walk half a block. No orthopnea. No PND. No LE edema. No abdominal edema. No lightheadedness, dizziness, pre-syncope or syncope. He was having some palpitations when he was taking double his thyroid medicaiton, they have resolved since stopping that. He is working with his PCP and endcrine doctor about how to take this medication in the future.No chest pain.    Weights 181-184.    No blood in the urine or blood in the stool. No prolonged nosebleeds.    No headaches. No stroke symptoms.    He has chronic driveline drainage- this is the same. He has waxing and waning skin redness. No pain. No fevers or chills.    No LVAD alarms.    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    SOCIAL HISTORY:  Social History     Socioeconomic History     Marital status:      Spouse name: Not on file     Number of children: Not on file     Years of education: Not on file     Highest education level: Not on file   Occupational History     Not on file   Social Needs     Financial resource strain: Not on file     Food insecurity     Worry: Not on file     Inability: Not on file     Transportation needs     Medical: Not on file     Non-medical: Not on file   Tobacco Use     Smoking status: Not on file   Substance and Sexual Activity     Alcohol use: Not on file     Drug use: Not on file     Sexual activity: Not on file   Lifestyle     Physical activity     Days per week: Not on file     Minutes per session: Not on file     Stress: Not on file   Relationships     Social connections     Talks on phone: Not on file     Gets together: Not on file     Attends Christianity service: Not on file     Active member of club or organization: Not on file     Attends meetings of clubs or organizations: Not on file     Relationship status: Not on file     Intimate partner violence     Fear of current or ex partner: Not on file     Emotionally abused: Not on file     Physically abused: Not on file     Forced sexual activity: Not on file   Other Topics Concern     Not on file   Social History Narrative     Not on file       CURRENT MEDICATIONS:  allopurinol (ZYLOPRIM) 100 MG tablet, 2 tablets (200 mg) by Oral or Feeding Tube route daily  amiodarone (PACERONE) 200 MG tablet, TAKE 1 TABLET BY MOUTH ONCE DAILY  amLODIPine (NORVASC) 5  MG tablet, Take 2 tablets (10 mg) by mouth daily  aspirin (ASA) 81 MG EC tablet, Take 1 tablet (81 mg) by mouth daily  atorvastatin (LIPITOR) 20 MG tablet, Take 1 tablet (20 mg) by mouth daily  B Complex-C-Folic Acid (RENAL) 1 MG CAPS, Take 1 capsule by mouth daily  cephALEXin (KEFLEX) 500 MG capsule, Take 1 capsule (500 mg) by mouth 4 times daily  co-enzyme Q-10 200 MG CAPS, 200 mg by Oral or Feeding Tube route daily  dapagliflozin (FARXIGA) 5 MG TABS tablet, Take 5 mg by mouth daily  finasteride (PROSCAR) 5 MG tablet, Take 1 tablet (5 mg) by mouth daily Helps with urinary retention.  FLUoxetine (PROZAC) 10 MG capsule, Take 30 mg by mouth daily  hydrALAZINE (APRESOLINE) 100 MG tablet, Take 1 tablet (100 mg) by mouth 3 times daily  hydrochlorothiazide (HYDRODIURIL) 25 MG tablet, Take 75 mg (3 tabs) every Wednesday & Saturday's  insulin pen needle (32G X 4 MM) 32G X 4 MM miscellaneous,   isosorbide mononitrate (IMDUR) 30 MG 24 hr tablet, Take 3 tablets (90 mg) by mouth daily  levothyroxine (SYNTHROID/LEVOTHROID) 75 MCG tablet, Take 1 tablet (75 mcg) by mouth every morning (before breakfast)  magnesium oxide (MAG-OX) 400 MG tablet, 1 tablet (400 mg) by Oral or Feeding Tube route daily  nitroGLYcerin (NITROSTAT) 0.4 MG sublingual tablet, For chest pain place 1 tablet under the tongue every 5 minutes for 3 doses. If symptoms persist 5 minutes after 1st dose call 911.  omeprazole (PRILOSEC) 20 MG DR capsule, Take 20 mg by mouth daily  sacubitril-valsartan (ENTRESTO) 24-26 MG per tablet, Take 0.5 tablets by mouth 2 times daily  senna-docusate (SENOKOT-S/PERICOLACE) 8.6-50 MG tablet, Take 1 tablet by mouth 2 times daily as needed for constipation   tamsulosin (FLOMAX) 0.4 MG capsule, Take 2 capsules (0.8 mg) by mouth daily This med helps with urinary retention  warfarin ANTICOAGULANT (COUMADIN) 2 MG tablet, Take by mouth daily Take 4 mg PO on Sundays and Wednesdays, and take 2 mg PO on all other days.  warfarin  ANTICOAGULANT (COUMADIN) 4 MG tablet, TAKE 1 TO 2 TABLETS (4 MG TO 8 MG) BY MOUTH ONCE DAILY AS DIRECTED  zolpidem (AMBIEN) 5 MG tablet, Take 5 mg by mouth nightly as needed for Insomnia  insulin glargine (BASAGLAR KWIKPEN) 100 UNIT/ML pen, Inject 10 Units Subcutaneous At Bedtime  (Patient not taking: No sig reported)    No current facility-administered medications on file prior to visit.      ROS:   See HPI    EXAM:  BP (!) 80/0 (BP Location: Right arm, Patient Position: Chair, Cuff Size: Adult Regular)   Pulse 71   Wt 89 kg (196 lb 1.6 oz)   SpO2 98%   BMI 30.17 kg/m      GENERAL: Appears comfortable, in no acute distress.   HEENT: Eye symmetrical, no discharge or icterus bilaterally. Mucous membranes moist and without lesions.  CV: Hum of HM3, occasional S1S2. JVP ~11.   RESPIRATORY: Respirations regular, even, and unlabored. Lungs CTA throughout.   GI: Soft, non distended with normoactive bowel sounds. No tenderness, rebound, guarding.   EXTREMITIES: Mild to b/l peripheral edema. All extremities are warm and well perfused  NEUROLOGIC: Alert and interacting appropriately. No focal deficits.   MUSCULOSKELETAL: No joint swelling or tenderness.   SKIN: No jaundice. No rashes or lesions. Driveline dressing c/d/i.      Labs - as reviewed in clinic with patient today:  CBC RESULTS:  Lab Results   Component Value Date    WBC 6.9 10/18/2022    WBC 7.9 03/19/2021    RBC 4.15 (L) 10/18/2022    RBC 3.88 03/19/2021    HGB 12.3 (L) 10/18/2022    HGB 10.1 (A) 03/19/2021    HCT 37.4 (L) 10/18/2022    HCT 32.2 03/19/2021    MCV 90 10/18/2022    MCV 83.0 03/19/2021    MCH 29.6 10/18/2022    MCH 26.0 03/19/2021    MCHC 32.9 10/18/2022    MCHC 31.4 03/19/2021    RDW 18.9 (H) 10/18/2022    RDW 22.8 03/19/2021     10/18/2022     03/19/2021     03/17/2021       CMP RESULTS:  Lab Results   Component Value Date     10/18/2022     04/09/2021    POTASSIUM 4.3 10/18/2022    POTASSIUM 4.2 05/03/2022     POTASSIUM 4.2 04/09/2021    CHLORIDE 100 10/18/2022    CHLORIDE 98 08/25/2022    CHLORIDE 103 04/09/2021    CO2 28 10/18/2022    CO2 26 05/03/2022    CO2 28 04/09/2021    ANIONGAP 10 10/18/2022    ANIONGAP 11 05/03/2022    ANIONGAP 8 04/09/2021    GLC 98 10/18/2022     (H) 09/20/2022     (H) 05/03/2022     (A) 04/09/2021    BUN 63.3 (H) 10/18/2022    BUN 46 (H) 05/03/2022    BUN 35 04/09/2021    CR 1.96 (H) 10/18/2022    CR 1.6 04/09/2021    GFRESTIMATED 36 (L) 10/18/2022    GFRESTIMATED 34 03/19/2021    GFRESTBLACK 39 (L) 03/17/2021    CALOS 9.1 10/18/2022    CALOS 9.0 04/09/2021    BILITOTAL 0.5 10/18/2022    BILITOTAL 0.5 03/19/2021    ALBUMIN 4.0 10/18/2022    ALBUMIN 3.7 05/03/2022    ALBUMIN 4.4 03/19/2021    ALKPHOS 175 (H) 10/18/2022    ALKPHOS 138.0 03/19/2021    ALT 88 (H) 10/18/2022    ALT 35 03/19/2021    AST 93 (H) 10/18/2022    AST 60.0 03/19/2021        INR RESULTS:  Lab Results   Component Value Date    INR 1.39 (H) 10/18/2022    INR 1.2 (L) 10/06/2022    INR 3.1 (A) 07/09/2021       Lab Results   Component Value Date    MAG 2.2 09/11/2022    MAG 1.5 (L) 03/17/2021     Lab Results   Component Value Date    NTBNPI 6,484 (H) 09/02/2022    NTBNPI 27,781 (H) 02/15/2021     Lab Results   Component Value Date    NTBNP 4,416 (H) 02/10/2022       Diagnostics    1/7/2021 RHC   Systolic (mmHg) Diastolic (mmHg) Mean (mmHg) A Wave (mmHg) V Wave (mmHg) EDP (mmHg) Max dp/dt (mmHg/sec) HR (bpm) Content (mL/dL) SAT (%)    RA Pressures  1:03 PM   5    4    9      84        RV Pressures  1:03 PM 24        5     84        PA Pressures  1:04 PM 24    7    13        88        PCW Pressures  1:04 PM   3        55          1/5/2021 ECHO    Interpretation Summary  Heartmate III LVAD, speed 5500 RPM     Left ventricular size is normal. Severely (EF 10-15%) reduced left ventricular  function is present.     LVAD cannula is not well seen in the LV apex. The outflow graft is well  visualized and Doppler is  normal .     Mild to moderate right ventricular dilation is present. Global right  ventricular function is mildly to moderately reduced.     Aortic valve opens with every heart beat.     The inferior vena cava was normal in size with preserved respiratory  variability.     No pericardial effusion is present.      Assessment and Plan:   Eliseo Tanner is a 67 year old male with chronic systolic heart failure secondary to NICM now s/p HM 3 on 2/18, moderate CAD, HTN, ABHINAV on CPAP, DM2, CKD Stage III, ANA. His HM3 post-op course was complicated by retrosternal hematoma and bleeding in the lungs, RV failure,VT in ICU now on amiodarone and Afib w/AVR S/p DCCV on 2/28. He had pre-op proteus and enterococcus bacteremia and he has had MSSA bacteremia as well, s/p abx; later on had LDH elevation 2/2 to legionella c/b renal failure requiring temporary dialysis (off iHD since 3/10/2021).  He presents today for LVAD follow-up.    He is feeling much better sine his speed increase and cardiomems. He still looks very mildly hypervolemic today. His renal function is improved, so we will take today to challenge his volume status. If he doesn't tolerate this, we will need to consider adjusting his Pad to a slightly higher range.        Chronic SCHF secondary to NICM s/p HM III with RV dysfunction. Implanted 2/18 and complicated by bleeding.   Stage D, NYHA Class IIIA  ACEi/ARB Hydralazine 100 mg TID. Continue amlodipine to 10 mg daily  BB Has been deferred given RHF, deferred d/t bradycardia now  Aldosterone antagonist deferred while other medical therapy is prioritized  SCD prophylaxis ICD  Fluid status Hypervolemic: Increase bumex to 5 mg BID Kcl and kcl to 80/60  MAP: Goal 65-85, he is-80 today  LDH: 283, stable  Anticoagulation: Coumadin per pharmacy.  Goal 1.8-2.5 for recurrent nosebleeding, INR 1.39- defer bridge, dosing per ACC team  Antiplatelet: ASA 81 mg po daily  Cardiomems goal: Early since implant, working on  refining his goal currently    VAD Interrogation on October 20, 2022. VAD interrogation reviewed with VAD coordinator. Agree with findings. Frequent PI events. No power spikes, speed drops, or other findings suspicious of pump malfunction noted.    CKD stage IV  Baseline has increased over the last year. Current B/l is around 1.8-2.2.   - Improved today to 1.96 (F2.4)  - Diuretic management as above  - BMP next week (increased bumex, refining cardiomems goal)    MSSA Driveline infection, ongoing drainage  - Patient saw Dr. Bhatti today, management per her and the ID team  - On Keflex for now     A. Fib.  Sinus Bradycardia   History of Afib w/ RVR and aberrancy vs. longterm vs. AT vs. Slow VT. S/p DCCV on 2/28 back into sinus rhythm. Has been tachycardic in the past but now in sinus bradycardia with increased RV pacing.  - Continue on amiodarone  - Increased lower pacer rate from 40 to 60 and turned on rate response at a prior appointment    HTN, within goal today  - Continue hydralazine and amlodipine    History of HIT  - Avoid heparin products     Follow up   - BMP in 1 week (incraeed bumex and kcl, refining pad goal)  - Dr. Cisneros 12/7 as scheduled    Billing  - I reviewed 3+ tests  - I managed 2 stable chronic problems  - I changed a prescription medication    BIJAL Padron TAMAS

## 2022-10-19 NOTE — TELEPHONE ENCOUNTER
----- Message from Negar Bhatti DO sent at 10/18/2022  3:25 PM CDT -----  Regarding: New antibiotic start  Hi all,   I will place dot phrase later but looking to start cefazolin 2 grams IV q 12 hours. Duration 2-4 weeks depending on response. Weekly labs CBC with diff, CMP, CRP.   His local health system is [facility].   He previously used [facility] care. May have limited insurance benefits.    Negar

## 2022-10-19 NOTE — TELEPHONE ENCOUNTER
Called patient to discuss PICC placement and infusions. Patient is hesitant to come to [place] for placement due to it is a 4 hour drive and would prefer to get all his care for IV abx in [facility].     RN will contact [facility] to see if they are able to get patient set up for PICC placement and IV abx infusions.     Spoke with [facility] office and they will handle setting up all order then will contact clinic if needed.     Dr. Jay Steele [facility]  [phone]   Fax [phone]   Attn: Renee Olivera RN  Infectious Disease 9:32 AM 10/19/22

## 2022-10-20 NOTE — PROGRESS NOTES
10/20/22 Addendum:  Received a note from VAD team stating Eliseo will be starting new antibiotic--cefazolin 2gm IV Q 12 hours for 2- 4 weeks.    Per micromedex, cefazolin may increase risk of bleeding.  He is scheduled for next INR check on 10/24/22.  Krysta Mcdaniel RN

## 2022-10-20 NOTE — PROGRESS NOTES
[facility] CORE Clinic - CardioMEMS Reading Review    October 20, 2022   Cardiomems period: 9/20 - 10/19    CardioMEMS reviewed and routed to patient's provider, Laine BARRY NP.     Current diuretic dose: Bumex 5 mg BID.  Hydrochlorothiazide 75 mg every Wednesday and Saturday   Current potassium chloride dose:  Potassium 80mEq BID w/ an extra 60 mEq Potassium on Wednesdays & Saturdays    Symptoms: None  Weights: btwn 181-187    Recent plan of care changes: Bumex increased to 5 mg BID and hydrochlorothiazide increased to 75 mg twice weekly. Potassium increased to 80mEq & 60 mEq    Current Threshold parameters: 19-22    Today's Waveform:       Reading Ranges:     PAS: 46-56  PAD: 20-27  PA Mean: 32-39  Heart Rate: 64-82      RHC Date Wedge Pressure MEMS PAD during cath  (average of the 3 values)     Sept 20, 2022 w/MEMS implant     15 mmHg   16 mmHg

## 2022-10-23 NOTE — LETTER
Patient Name: Eliseo Tanner   : 1955   Diagnosis/ICD-10: Heart Failure, unspecified I50.9; LVAD Z95.811   Requesting Physician: Dr. Nader Cisneros   Date of Request:    Date to Draw 2020     **Please fax results to Gladys VANESSA RN VAD Coord at [phone]. Call [phone] with Questions    ORDER CODE TESTS NANCY.VOL.    CBASIC Na, K, Cl, CO2, Crea, BUN, Glu, Ca GG 0.5-1    BHEPAT Alb, AlkP, ALT, AST, BBil, TP GG 0.5-1    BLIP Chol, Trig, HDL, LDL GG 1-2    CCOMP Na, K, Cl, CO2, Crea, BUN, Glu, Ca, Alb, AlkP, ALT, AST, Bbil, TP,  GG 0.6-1    HGP CBC & Platelet P 0.3-1    HGDP CBC, Differential & Platelet P 0.3-1    PLT Platelet  P 0.3-1   X INR INR B 2.7    PTT PTT B 2.7    LD Lactate Dehydrogenase GG 0.3-1    PHGB Plasma Hemoglobin GG 2-3    Pre-Albumin Pre-Albumin RG 1.0     D Dimer    X BNP N terminal pro BNP       Signed,      Nader Cisneros MD  Heart Failure, Mechanical Circulatory Support and Transplant Cardiology   of Medicine,  Division of Cardiology, [facility]   pt is s/p C1-C6 posterior decomp. C4-C5 posterior column ostcotomy, C1-C7 fusion with complex plastic closure on 10/18

## 2022-10-24 NOTE — PROGRESS NOTES
ANTICOAGULATION MANAGEMENT     Eliseo Tanner 67 year old male is on warfarin with subtherapeutic INR result. (Goal INR 1.7-2.3)    Recent labs: (last 7 days)     10/24/22  1107   INR 1.3*       ASSESSMENT       Source(s): Chart Review    Previous INR was Subtherapeutic    Medication, diet, health changes since last INR chart reviewed;    +PICC placed 10/24/22      Received a note from VAD team stating Eliseo will be starting new antibiotic--cefazolin 2gm IV Q 12 hours for 2- 4 weeks.     Per micromedex, cefazolin may increase risk of bleeding.  He is scheduled for next INR check on 10/24/22.  Krysta Mcdaniel RN     PLAN     Recommended plan for temporary change(s) affecting INR     Dosing Instructions: Increase your warfarin dose (9.1% change) with next INR in 4 days       Summary  As of 10/24/2022    Full warfarin instructions:  2 mg every Sun, Fri; 4 mg all other days; Starting 10/24/2022   Next INR check:  10/27/2022             Detailed voice message left for  spouse Chapis with dosing instructions and follow up date.     Check at provider office visit    Education provided:     Please call back if any changes to your diet, medications or how you've been taking warfarin    Plan made per ACC anticoagulation protocol and per LVAD protocol    Young Bustos, RN  Anticoagulation Clinic  10/24/2022    _______________________________________________________________________     Anticoagulation Episode Summary     Current INR goal:  1.7-2.3   TTR:  38.9 % (11.8 mo)   Target end date:  Indefinite   Send INR reminders to:  ANTICOAG LVAD    Indications    LVAD (left ventricular assist device) present (H) [Z95.811]  Chronic systolic congestive heart failure (H) [I50.22]  Paroxysmal atrial fibrillation (H) [I48.0]           Comments:  7/12/22 GOAL RANGE CHANGE 1.7-2.3   HM 3 placed 2/18/2020, 81mg ASA,  5/6/22 [employer] Home Meter         Anticoagulation Care Providers     Provider Role Specialty Phone number    Nader Cisneros MD  Referring Cardiovascular Disease [phone]

## 2022-10-25 NOTE — PROGRESS NOTES
[facility] CORE Clinic - CardioMEMS Reading Review    October 25, 2022, 1240   CardioMems period: 10/20 - 11/18      CardioMEMS reviewed and routed to patient's provider, Laine BARRY NP.     Current diuretic: Bumex 5 mg BID.  Hydrochlorothiazide 75 mg every Wednesday and Saturday      Current potassium chloride dose:  Potassium 80mEq BID w/ an extra 60 mEq Potassium on Wednesdays & Saturdays    Symptoms: None  Weights: Today, going back: 183, 183, 184    Changes to plan of care today: change threshold to 21-24    Current Threshold parameters: 19-22    Today's Waveform:       Readings:         [facility] Date Wedge Pressure MEMS PAD during cath  (average of the 3 values)     Sept 20, 2022 w/MEMS implant     15 mmHg   16 mmHg

## 2022-10-25 NOTE — TELEPHONE ENCOUNTER
Called patient and [facility] did not put in PICC they had an issue with anesthesiologist not wanting to place due to afraid of infection. Patient has been receiving peripheral IV antibiotics.     Patient is going to ask Dr. Steele's office to see if [facility] will place PICC.     Sent patient RN's direct phone number if there are any issues.     Vinnie Olivera RN  Infectious Disease 1:11 PM 10/25/22

## 2022-10-25 NOTE — TELEPHONE ENCOUNTER
[facility] Call Center    Phone Message    May a detailed message be left on voicemail: yes     Reason for Call: Other: Patient would like to know if care team can Send orders to [facility] in [place] for them to place his PIC line to start IV  antibiotics asap. Thank you     Action Taken: Other: ID     Travel Screening: Not Applicable

## 2022-10-25 NOTE — PROGRESS NOTES
Remote monitoring of Pulmonary Artery Pressures via CardioMEMS device reviewed at least weekly and as needed with nursing 9/20 through 10/19. Diuretics adjusted accordingly.   Laine Leon, APRN CNP  10/25/2022

## 2022-10-25 NOTE — PROGRESS NOTES
D: Rcvd follow up labs.  Creat elevated.  I:  Discussed with Jason MATHIAS.  Plan: CardioMems threshold changed to 21 - 24, stop Entresto, continue with current diuretic regimen, recheck BMP on Monday, 10/31.  Pt to monitor MAP's at minimum every other day.  Pt advised via phone and MyChart.  Pt with standing BMP order at [facility] lab.  A: Follow up  P:  Pt verbalized understanding of the instructions given.  Will call VAD coordinator with further needs and questions.

## 2022-10-25 NOTE — PROGRESS NOTES
ANTICOAGULATION MANAGEMENT     Eliseo Tanner 67 year old male is on warfarin with subtherapeutic INR result. (Goal INR 1.7-2.3)    Recent labs: (last 7 days)     10/24/22  1107   INR 1.3*       ASSESSMENT       Source(s): Patient/Caregiver Call       Warfarin doses taken: Warfarin taken as instructed    Diet: No new diet changes identified    New illness, injury, or hospitalization: No    Medication/supplement changes: Cefazolin infusion started     Signs or symptoms of bleeding or clotting: No    Previous INR: Subtherapeutic    Additional findings: None       PLAN     Recommended plan for no diet, medication or health factor changes affecting INR     Dosing Instructions: Increase your warfarin dose (8.3% change) with next INR in 1 week       Summary  As of 10/25/2022    Full warfarin instructions:  2 mg every Sun; 4 mg all other days; Starting 10/25/2022   Next INR check:  10/31/2022             Telephone call with Eliseo who verbalizes understanding and agrees to plan and who agrees to plan and repeated back plan correctly    Patient using outside facility for labs    Education provided:     None required    Plan made per ACC anticoagulation protocol and per LVAD protocol    Luiza Perkins RN  Anticoagulation Clinic  10/25/2022    _______________________________________________________________________     Anticoagulation Episode Summary     Current INR goal:  1.7-2.3   TTR:  39.0 % (11.7 mo)   Target end date:  Indefinite   Send INR reminders to:  ANTICOAG LVAD    Indications    LVAD (left ventricular assist device) present (H) [Z95.811]  Chronic systolic congestive heart failure (H) [I50.22]  Paroxysmal atrial fibrillation (H) [I48.0]           Comments:  7/12/22 GOAL RANGE CHANGE 1.7-2.3   HM 3 placed 2/18/2020, 81mg ASA,  5/6/22 [employer]eter         Anticoagulation Care Providers     Provider Role Specialty Phone number    Nader Cisneros MD Referring Cardiovascular Disease [phone]

## 2022-10-26 NOTE — TELEPHONE ENCOUNTER
Patient calling as he is having problems getting PICC placed as he is in stage 4 kidney disease and they need a nephrologist to sign off on the PICC placement. Patient has not seen his nephrologist in over a year so he will not sign off on patient getting PICC.     Patient has been getting Abx peripherally since 10/20/22 ay .     Will send to Dr. Bhatti to see if we would have any one of the nephrologists that saw him here that would sign off and call Dr. Sheikh office to inform that ok to place PICC [phone] as for MUSHTAQ Duran.     Vinnie Olivera RN  Infectious Disease 3:37 PM 10/26/22

## 2022-10-27 NOTE — TELEPHONE ENCOUNTER
We don't have a nephrologist that he has seen as an outpatient recently-last time was 2021.     Attempting to coordinate care with Dr. Yoon.     Called patient to update on status. No answer - left a message.       Vinnie Olivera RN  Infectious Disease 9:12 AM 10/27/22

## 2022-10-27 NOTE — TELEPHONE ENCOUNTER
Drainage is getting lighter color more light yellow, not decreased much in volume. They are leaving peripheral line in x4 days. Wife will change dressing when she gets home.     Patient will either try to upload a picture via [employer] or call and leave detailed message on RNs VM with condition of access site when wife changes dressing this eveing.     Patient is ok with current plan to continue with peripheral IV abx as long as it is continuing to get better as he does not want to place himself at more risk than is necessary.       Will route to provider to inform of plan and wait for patient to update on 10/28/22.       Vinnie Olivera RN  Infectious Disease 4:04 PM 10/27/22

## 2022-10-27 NOTE — TELEPHONE ENCOUNTER
Daniel Yoon MD Fontana, Lauren, DO; Vinnie Olivera, RN; P Cardiology Vad Coordinators-  Caller: Unspecified (Yesterday,  3:33 PM)  So this patient certainly has some high-risk features for future ESRD/dialysis (variable creatinine, reduced GFR, and he has required dialysis before), which would make a PICC far less ideal. Of course, if the patient would never consider dialysis should his kidneys fail, then preserving dialysis access isn't necessary.     If this isn't already known (his goals of care with regard to future dialysis), we probably should have him have an appointment with someone in the renal division (such as an LISA, as you suggested, which I can arrange). Alternatively, our (renal) notes do say that he has a local nephrologist in [facility].     What will be your option(s) if we say a PICC is a bad idea (if he says he would want to do dialysis)? I can tell you that our  really wants a no PICCs ever in patients with GFR < 60 policy.     -Olivier Yoon

## 2022-10-28 NOTE — TELEPHONE ENCOUNTER
Per Dr. Bhatti if Infusion center ok with continuing abx via peripheral IV then will continue till patient completes current round of medication.     Called patient and left message with information. Patient to call if things do not work out or if he has questions or concerns.       Vinnie Olivera RN  Infectious Disease 11:33 AM 10/28/22

## 2022-10-31 NOTE — PROGRESS NOTES
ANTICOAGULATION MANAGEMENT     Eliseo Tanner 67 year old male is on warfarin with subtherapeutic INR result. (Goal INR 1.7-2.3)    Recent labs: (last 7 days)     10/31/22  0857   INR 1.2*       ASSESSMENT       Source(s): Chart Review and Patient/Caregiver Call       Warfarin doses taken: Warfarin taken as instructed    Diet: No new diet changes identified    New illness, injury, or hospitalization: No    Medication/supplement changes: None noted    Signs or symptoms of bleeding or clotting: No    Previous INR: Subtherapeutic    Additional findings: None       PLAN     Recommended plan for no diet, medication or health factor changes affecting INR     Dosing Instructions: booster dose then Increase your warfarin dose (7.7% change) with next INR in 1 week       Summary  As of 10/31/2022    Full warfarin instructions:  10/31: 6 mg; Otherwise 4 mg every day; Starting 10/31/2022   Next INR check:  11/7/2022             Telephone call with Eliseo who verbalizes understanding and agrees to plan and who agrees to plan and repeated back plan correctly    Patient to recheck with home meter    Education provided:     Contact [phone] with any changes, questions or concerns.     Plan made per [facility] anticoagulation protocol and per LVAD protocol    APCO MARQUEZ RN  Anticoagulation Clinic  10/31/2022    _______________________________________________________________________     Anticoagulation Episode Summary     Current INR goal:  1.7-2.3   TTR:  38.9 % (11.8 mo)   Target end date:  Indefinite   Send INR reminders to:  ANTICOAG LVAD    Indications    LVAD (left ventricular assist device) present (H) [Z95.811]  Chronic systolic congestive heart failure (H) [I50.22]  Paroxysmal atrial fibrillation (H) [I48.0]           Comments:  7/12/22 GOAL RANGE CHANGE 1.7-2.3   HM 3 placed 2/18/2020, 81mg ASA,  5/6/22 [employer] Home Meter         Anticoagulation Care Providers     Provider Role Specialty Phone number    Nader Cisneros MD  Referring Cardiovascular Disease [phone]

## 2022-10-31 NOTE — PROGRESS NOTES
[facility] CORE Clinic - CardioMEMS Reading Review    October 31, 2022, 1125   CardioMems period: 10/20 - 11/18      CardioMEMS reviewed and routed to patient's provider, Laine BARRY NP.     Current diuretic: Bumex 5 mg BID.  Hydrochlorothiazide 75 mg every Wednesday and Saturday      Current potassium chloride dose:  Potassium 80mEq BID w/ an extra 60 mEq Potassium on Wednesdays & Saturdays    Symptoms: None  Weights: Today, going back: 177, 177, 178, 179, 182, 181.   MAP's stable btwn 67 - 77.  Most recently 77.    Changes to plan of care today: INCREASE Potassium to 80 mEq BID.  Pt notified via [employer]    Current Threshold parameters: 21 - 24    Today's Waveform:       Readings:         C Date Wedge Pressure MEMS PAD during cath  (average of the 3 values)     Sept 20, 2022 w/MEMS implant     15 mmHg   16 mmHg

## 2022-11-07 NOTE — PROGRESS NOTES
[facility] CORE Clinic - CardioMEMS Reading Review    November 7, 2022, 1512   CardioMems period: 10/20 - 11/18      CardioMEMS reviewed and routed to patient's provider, Laine BARRY NP.     Current diuretic: Bumex 5 mg BID.  Hydrochlorothiazide 75 mg every Wednesday and Saturday      Current potassium chloride dose:  Potassium 80mEq BID w/ an extra 60 mEq Potassium on Wednesdays & Saturdays    Symptoms: None  Weights: Today, going back: 177, 177, 179, & 179.    MAP's 82 & 78       Changes to plan of care today: None    Current Threshold parameters: 21 - 24    Today's Waveform:       Readings:         RHC Date Wedge Pressure MEMS PAD during cath  (average of the 3 values)     Sept 20, 2022 w/MEMS implant     15 mmHg   16 mmHg

## 2022-11-08 NOTE — LETTER
11/8/2022       RE: Eliseo Tanner  [address] [place] [address]     Dear Colleague,    Thank you for referring your patient, Eliseo Tanner, to the [facility] INFECTIOUS DISEASE CLINIC [place] at [facility]. Please see a copy of my visit note below.    Infectious Diseases LVAD Clinic Note  11/08/2022    Chief Complaint:  Chronic LVAD Infection    Recommendations     1. Continue cefazolin through 10/18/22. Okay to stop treatment after the last dose on 10/18/22. Does not have a PICC line in place.   2. Despite the cephalexin or cefadroxil suppression he has had increased drainage. Unfortunately it is doxycycline resistant. Another option would be 1 single strength bactrim once a day (crcl 37). This is slightly lower dose than 1 DS daily but uncertain at this point where creatinine will [place]. Will discuss this option with cardiology since his creatinine is increasing. Potassium is okay. If transitioned to tmp/smx will follow labs closely.     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 He noticed increased drainage when he switched from cephalexin to cefadroxil about 1 year ago. Prior to this he had no drainage.On 10/18/22 he endorsed ongoing increased drainage with intermittent erythema. Prior to this we had attempted to switch from cefadroxil to cephalexin.  There does not seem to be a definitive reason why cephalexin would work better since they are in the same class of antibiotics but given his historical experience of drainage resolution we attempted this change for about 2  weeks but there was no change in difference. On 10/18  there was significant amount of green/yellow drainage saturating the dressing with some blood tinged drainage. The drainage appeared more than previously described. On 10/21 he started cefazolin 2 grams IV q 12 hours through a peripheral IV to assess if we can decrease the burden of bacteria. He has completed about 2.5 weeks of cefazolin with improvement in the drainage. He will need suppression again but the drainage worsened on cephalosporin suppression so discuss  bactrim suppression with close lab monitoring. The MSSA is tetracycline resistant. Crcl 37.     LVAD History:  Current LVAD model: History of NICM s/p HM III  Date current LVAD placed: 2/18/20  Previous LVAD devices: none  Other prosthetic devices/materials:  ICD 3/16/20, Mems 8/2022  Primary cardiologist: Dr. Cisneros  Primary ID provider: Magy    History of bacteremias and driveline infections (dates and organisms):   - MSSA bacteremia secondary to MSSA driveline infection (11/2020).  Remains on cefadroxil or cephalexin for suppression.  The amount of drainage waxes and wanes (see above).     History of other pertinent infections:   -Severe Legionella pneumonia serogroup 1L severe Legionella pneumonia c/b cardiogenic shock, oliguric renal failure requiring CRRT and respiratory decompensation requiring intubation. Urine antigen positive, sputum PCR positive plus pulmonary opacities. s/p azithromycin (completed 2/25)     Current suppressive antibiotics:   -Previous cephalexin and cefadroxil    Transplant Plan: destination therapy     HPI:   Last seen by me 10/18/22. Started cefazolin on Friday 10/21/22. He has been going to the hospital for infusions every 12 hours. The driveline drainage has decreased. The color has not changed. Changing the dressing once day. The drainage goes through the 2 bottom pieces of the guaze but not through the top 2 layers of the gauze. There is an outline but the  dressing is not as saturated like it was.  Received influenza and bivalent COVID vaccination. No subjective fevers or chills.     Patient Active Problem List   Diagnosis     Acute on chronic systolic congestive heart failure (H)     Anxiety     CKD (chronic kidney disease) stage 3, GFR 30-59 ml/min (H)     Coronary artery disease involving native coronary artery of native heart without angina pectoris     GERD (gastroesophageal reflux disease)     Gout     Hyperlipidemia with target LDL less than 100     Nonischemic cardiomyopathy (H)     Non-nephrotic range proteinuria     ABHINAV on CPAP     Type 2 diabetes mellitus with stage 3 chronic kidney disease, without long-term current use of insulin (H)     Heart failure (H)     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)     Cardiac arrest (H)     LVAD (left ventricular assist device) present (H)     Chronic systolic congestive heart failure (H)     CKD (chronic kidney disease) stage 4, GFR 15-29 ml/min (H)     Bacteremia     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)     Paroxysmal atrial fibrillation (H)     Wound infection     ACC/AHA stage D heart failure (H)     Stage 3b chronic kidney disease (H)     Mixed hyperlipidemia     Benign essential hypertension     Dizziness     Syncope     Tachyarrhythmia     Acute kidney injury (BERNARDA) with acute tubular necrosis (ATN) (H)     Iron deficiency anemia, unspecified iron deficiency anemia type     Hypervolemia, unspecified hypervolemia type       Allergies   Allergen Reactions     Heparin      HIT screen positive 2/14/20, reflex DAVINA negative; however heme recommended treating as if positive  HIT screen negative 2/11/20     Oxycodone Itching and Other (See Comments)     Chlorhexidine Rash       Medications:  Current Outpatient Medications   Medication Sig Dispense Refill     allopurinol (ZYLOPRIM) 100 MG tablet 2 tablets (200 mg) by Oral or Feeding Tube route daily 60 tablet 1      amiodarone (PACERONE) 200 MG tablet TAKE 1 TABLET BY MOUTH ONCE DAILY 90 tablet 3     amLODIPine (NORVASC) 5 MG tablet Take 2 tablets (10 mg) by mouth daily 180 tablet 3     aspirin (ASA) 81 MG EC tablet Take 1 tablet (81 mg) by mouth daily 90 tablet 3     atorvastatin (LIPITOR) 20 MG tablet Take 1 tablet (20 mg) by mouth daily 90 tablet 3     B Complex-C-Folic Acid (RENAL) 1 MG CAPS Take 1 capsule by mouth daily 30 capsule 0     bumetanide (BUMEX) 1 MG tablet Take 5 tablets (5 mg) by mouth 2 times daily 900 tablet 3     cephALEXin (KEFLEX) 500 MG capsule Take 1 capsule (500 mg) by mouth 4 times daily 120 capsule 0     co-enzyme Q-10 200 MG CAPS 200 mg by Oral or Feeding Tube route daily       dapagliflozin (FARXIGA) 5 MG TABS tablet Take 5 mg by mouth daily       finasteride (PROSCAR) 5 MG tablet Take 1 tablet (5 mg) by mouth daily Helps with urinary retention. 30 tablet 0     FLUoxetine (PROZAC) 10 MG capsule Take 30 mg by mouth daily       hydrALAZINE (APRESOLINE) 100 MG tablet Take 1 tablet (100 mg) by mouth 3 times daily 270 tablet 3     hydrochlorothiazide (HYDRODIURIL) 25 MG tablet Take 75 mg (3 tabs) every Wednesday & Saturday's 90 tablet 3     insulin glargine (BASAGLAR KWIKPEN) 100 UNIT/ML pen Inject 10 Units Subcutaneous At Bedtime  (Patient not taking: No sig reported)       insulin pen needle (32G X 4 MM) 32G X 4 MM miscellaneous        isosorbide mononitrate (IMDUR) 30 MG 24 hr tablet Take 3 tablets (90 mg) by mouth daily 270 tablet 3     levothyroxine (SYNTHROID/LEVOTHROID) 75 MCG tablet Take 1 tablet (75 mcg) by mouth every morning (before breakfast) 30 tablet 1     magnesium oxide (MAG-OX) 400 MG tablet 1 tablet (400 mg) by Oral or Feeding Tube route daily 30 tablet 1     nitroGLYcerin (NITROSTAT) 0.4 MG sublingual tablet For chest pain place 1 tablet under the tongue every 5 minutes for 3 doses. If symptoms persist 5 minutes after 1st dose call 911. 30 tablet 0     omeprazole (PRILOSEC) 20 MG   capsule Take 20 mg by mouth daily       potassium chloride ER (KLOR-CON M) 20 MEQ CR tablet Take 80 mEq (4 tabs) in the AM and 80 mEq (4 tabs) in the PM. Take an additional 60 mEq on Wednesdays and Saturdays with HCTZ 360 tablet 3     sacubitril-valsartan (ENTRESTO) 24-26 MG per tablet Take 0.5 tablets by mouth 2 times daily 60 tablet 1     senna-docusate (SENOKOT-S/PERICOLACE) 8.6-50 MG tablet Take 1 tablet by mouth 2 times daily as needed for constipation        tamsulosin (FLOMAX) 0.4 MG capsule Take 2 capsules (0.8 mg) by mouth daily This med helps with urinary retention 30 capsule 0     warfarin ANTICOAGULANT (COUMADIN) 2 MG tablet Take by mouth daily Take 4 mg PO on Sundays and Wednesdays, and take 2 mg PO on all other days.       warfarin ANTICOAGULANT (COUMADIN) 4 MG tablet TAKE 1 TO 2 TABLETS (4 MG TO 8 MG) BY MOUTH ONCE DAILY AS DIRECTED 90 tablet 3     zolpidem (AMBIEN) 5 MG tablet Take 5 mg by mouth nightly as needed for Insomnia         Immunizations:  Immunization History   Administered Date(s) Administered     COVID-19,PF,Moderna 02/11/2021     COVID-19,PF,Pfizer (12+ Yrs) 02/11/2021, 09/01/2021, 09/22/2021     COVID-19,PF,Pfizer 12+ YRS BIVALENT Booster 10/26/2022     Flu, Unspecified 11/15/2019     Influenza (High Dose) 3 valent vaccine 10/25/2019     Pneumo Conj 13-V (2010&after) 09/06/2018       Exam:  There were no vitals taken for this visit.-virtual visit    GENERAL: Healthy, alert and no distress  EYES: Eyes grossly normal to inspection  RESP: No accessory muscle use. No audible wheeze, cough  NEURO: Alert. Oriented. Mentation and speech appropriate for age.  Reviewed 10/26/22 pictures: dressing saturation improved.    Labs:  WBC   Date Value Ref Range Status   03/19/2021 7.9 10^9/L Final     WBC Count   Date Value Ref Range Status   10/18/2022 6.9 4.0 - 11.0 10e3/uL Final       CRP Inflammation   Date Value Ref Range Status   08/17/2021 4.9 0.0 - 8.0 mg/L Final   02/16/2021 270.0 (H) 0.0 -  8.0 mg/L Final   02/15/2021 270.0 (H) 0.0 - 8.0 mg/L Final   02/11/2021 8.6 0.0 - 10.0 mg/L Final       Creatinine   Date Value Ref Range Status   10/18/2022 1.96 (H) 0.67 - 1.17 mg/dL Final   09/20/2022 2.14 (H) 0.67 - 1.17 mg/dL Final   09/11/2022 1.73 (H) 0.67 - 1.17 mg/dL Final   04/09/2021 1.6 mg/dL Final   03/19/2021 2.1 mg/dL Final   03/17/2021 2.00 (H) 0.66 - 1.25 mg/dL Edwige Gomes is a 67 year old who is being evaluated via a billable video visit.      How would you like to obtain your AVS? MyChart  If the video visit is dropped, the invitation should be resent by: Text to cell phone: [phone]  Will anyone else be joining your video visit? No   Eliseo is a 67 year old who is being evaluated via a billable video visit.    Video-Visit Details  Video Start Time: 3:32 pm  Type of service:  Video Visit  Video End Time:3:46 PM  Originating Location (pt. Location): Home  Distant Location (provider location):  Off-site  Platform used for Video Visit: [employer]

## 2022-11-08 NOTE — PROGRESS NOTES
ANTICOAGULATION MANAGEMENT     Eliseo Tanner 67 year old male is on warfarin with subtherapeutic INR result. (Goal INR 1.7-2.3)    Recent labs: (last 7 days)     11/07/22  0000   INR 1.4*       ASSESSMENT       Source(s): Chart Review and Patient/Caregiver Call       Warfarin doses taken: Less warfarin taken than planned which may be affecting INR    Diet: No new diet changes identified    New illness, injury, or hospitalization: No    Medication/supplement changes: None noted    Signs or symptoms of bleeding or clotting: No    Previous INR: Subtherapeutic    Additional findings: None       PLAN     Recommended plan for temporary change(s) affecting INR     Dosing Instructions: Continue with previous warfarin recommendation with next INR in 1 week       Summary  As of 11/8/2022    Full warfarin instructions:  6 mg every Tue; 4 mg all other days; Starting 11/8/2022   Next INR check:  11/14/2022             Telephone call with Eliseo who verbalizes understanding and agrees to plan and who agrees to plan and repeated back plan correctly    Patient to recheck with home meter    Education provided:     Taking warfarin: Importance of taking warfarin as instructed    Goal range and lab monitoring: goal range and significance of current result and Importance of therapeutic range    Plan made per [facility] anticoagulation protocol and per LVAD protocol    Gloria Abbasi RN  Anticoagulation Clinic  11/8/2022    _______________________________________________________________________     Anticoagulation Episode Summary     Current INR goal:  1.7-2.3   TTR:  38.1 % (11.7 mo)   Target end date:  Indefinite   Send INR reminders to:  ANTICOAG LVAD    Indications    LVAD (left ventricular assist device) present (H) [Z95.811]  Chronic systolic congestive heart failure (H) [I50.22]  Paroxysmal atrial fibrillation (H) [I48.0]           Comments:  7/12/22 GOAL RANGE CHANGE 1.7-2.3   HM 3 placed 2/18/2020, 81mg ASA,  5/6/22 [employer] Home Meter          Anticoagulation Care Providers     Provider Role Specialty Phone number    Nader Cisneros MD Referring Cardiovascular Disease [phone]

## 2022-11-08 NOTE — PROGRESS NOTES
Eliseo is a 67 year old who is being evaluated via a billable video visit.      How would you like to obtain your AVS? MyChart  If the video visit is dropped, the invitation should be resent by: Text to cell phone: [phone]  Will anyone else be joining your video visit? No   Eliseo is a 67 year old who is being evaluated via a billable video visit.    Video-Visit Details  Video Start Time: 3:32 pm  Type of service:  Video Visit  Video End Time:3:46 PM  Originating Location (pt. Location): Home  Distant Location (provider location):  Off-site  Platform used for Video Visit: [employer]

## 2022-11-08 NOTE — PROGRESS NOTES
Infectious Diseases LVAD Clinic Note  11/08/2022    Chief Complaint:  Chronic LVAD Infection    Recommendations     1. Continue cefazolin through 11/18/22. Okay to stop treatment after the last dose on 10/18/22. Does not have a PICC line in place.   2. Despite the cephalexin or cefadroxil suppression he has had increased drainage. Unfortunately it is doxycycline resistant. Another option would be 1 single strength bactrim once a day (crcl 37). This is slightly lower dose than 1 DS daily but uncertain at this point where creatinine will seattle. Will discuss this option with cardiology since his creatinine is increasing. Potassium is okay. If transitioned to tmp/smx will follow labs closely. Will also need to alert anticoag clinic.    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 He noticed increased drainage when he switched from cephalexin to cefadroxil about 1 year ago. Prior to this he had no drainage.On 10/18/22 he endorsed ongoing increased drainage with intermittent erythema. Prior to this we had attempted to switch from cefadroxil to cephalexin.  There does not seem to be a definitive reason why cephalexin would work better since they are in the same class of antibiotics but given his historical experience of drainage resolution we attempted this change for about 2 weeks but there was no change in difference. On 10/18  there was significant amount of green/yellow drainage saturating the dressing with some blood tinged drainage. The drainage appeared more than previously described. On 10/21 he started cefazolin 2 grams IV q 12 hours  through a peripheral IV to assess if we can decrease the burden of bacteria. He has completed about 2.5 weeks of cefazolin with improvement in the drainage. He will need suppression again but the drainage worsened on cephalosporin suppression so discuss  bactrim suppression with close lab monitoring. The MSSA is tetracycline resistant. Crcl 37.     LVAD History:  Current LVAD model: History of NICM s/p HM III  Date current LVAD placed: 2/18/20  Previous LVAD devices: none  Other prosthetic devices/materials:  ICD 3/16/20, Mems 8/2022  Primary cardiologist: Dr. Cisneros  Primary ID provider: Magy    History of bacteremias and driveline infections (dates and organisms):   - MSSA bacteremia secondary to MSSA driveline infection (11/2020).  Remains on cefadroxil or cephalexin for suppression.  The amount of drainage waxes and wanes (see above).     History of other pertinent infections:   -Severe Legionella pneumonia serogroup 1L severe Legionella pneumonia c/b cardiogenic shock, oliguric renal failure requiring CRRT and respiratory decompensation requiring intubation. Urine antigen positive, sputum PCR positive plus pulmonary opacities. s/p azithromycin (completed 2/25)     Current suppressive antibiotics:   -Previous cephalexin and cefadroxil    Transplant Plan: destination therapy     HPI:   Last seen by me 10/18/22. Started cefazolin on Friday 10/21/22. He has been going to the hospital for infusions every 12 hours. The driveline drainage has decreased. The color has not changed. Changing the dressing once day. The drainage goes through the 2 bottom pieces of the guaze but not through the top 2 layers of the gauze. There is an outline but the dressing is not as saturated like it was.  Received influenza and bivalent COVID vaccination. No subjective fevers or chills.     Patient Active Problem List   Diagnosis     Acute on chronic systolic congestive heart failure (H)     Anxiety     CKD (chronic kidney disease)  stage 3, GFR 30-59 ml/min (H)     Coronary artery disease involving native coronary artery of native heart without angina pectoris     GERD (gastroesophageal reflux disease)     Gout     Hyperlipidemia with target LDL less than 100     Nonischemic cardiomyopathy (H)     Non-nephrotic range proteinuria     ABHINAV on CPAP     Type 2 diabetes mellitus with stage 3 chronic kidney disease, without long-term current use of insulin (H)     Heart failure (H)     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)     Cardiac arrest (H)     LVAD (left ventricular assist device) present (H)     Chronic systolic congestive heart failure (H)     CKD (chronic kidney disease) stage 4, GFR 15-29 ml/min (H)     Bacteremia     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)     Paroxysmal atrial fibrillation (H)     Wound infection     ACC/AHA stage D heart failure (H)     Stage 3b chronic kidney disease (H)     Mixed hyperlipidemia     Benign essential hypertension     Dizziness     Syncope     Tachyarrhythmia     Acute kidney injury (BERNARDA) with acute tubular necrosis (ATN) (H)     Iron deficiency anemia, unspecified iron deficiency anemia type     Hypervolemia, unspecified hypervolemia type       Allergies   Allergen Reactions     Heparin      HIT screen positive 2/14/20, reflex DAVINA negative; however heme recommended treating as if positive  HIT screen negative 2/11/20     Oxycodone Itching and Other (See Comments)     Chlorhexidine Rash       Medications:  Current Outpatient Medications   Medication Sig Dispense Refill     allopurinol (ZYLOPRIM) 100 MG tablet 2 tablets (200 mg) by Oral or Feeding Tube route daily 60 tablet 1     amiodarone (PACERONE) 200 MG tablet TAKE 1 TABLET BY MOUTH ONCE DAILY 90 tablet 3     amLODIPine (NORVASC) 5 MG tablet Take 2 tablets (10 mg) by mouth daily 180 tablet 3     aspirin (ASA) 81 MG EC tablet Take 1 tablet (81 mg) by mouth daily 90 tablet 3     atorvastatin  (LIPITOR) 20 MG tablet Take 1 tablet (20 mg) by mouth daily 90 tablet 3     B Complex-C-Folic Acid (RENAL) 1 MG CAPS Take 1 capsule by mouth daily 30 capsule 0     bumetanide (BUMEX) 1 MG tablet Take 5 tablets (5 mg) by mouth 2 times daily 900 tablet 3     cephALEXin (KEFLEX) 500 MG capsule Take 1 capsule (500 mg) by mouth 4 times daily 120 capsule 0     co-enzyme Q-10 200 MG CAPS 200 mg by Oral or Feeding Tube route daily       dapagliflozin (FARXIGA) 5 MG TABS tablet Take 5 mg by mouth daily       finasteride (PROSCAR) 5 MG tablet Take 1 tablet (5 mg) by mouth daily Helps with urinary retention. 30 tablet 0     FLUoxetine (PROZAC) 10 MG capsule Take 30 mg by mouth daily       hydrALAZINE (APRESOLINE) 100 MG tablet Take 1 tablet (100 mg) by mouth 3 times daily 270 tablet 3     hydrochlorothiazide (HYDRODIURIL) 25 MG tablet Take 75 mg (3 tabs) every Wednesday & Saturday's 90 tablet 3     insulin glargine (BASAGLAR KWIKPEN) 100 UNIT/ML pen Inject 10 Units Subcutaneous At Bedtime  (Patient not taking: No sig reported)       insulin pen needle (32G X 4 MM) 32G X 4 MM miscellaneous        isosorbide mononitrate (IMDUR) 30 MG 24 hr tablet Take 3 tablets (90 mg) by mouth daily 270 tablet 3     levothyroxine (SYNTHROID/LEVOTHROID) 75 MCG tablet Take 1 tablet (75 mcg) by mouth every morning (before breakfast) 30 tablet 1     magnesium oxide (MAG-OX) 400 MG tablet 1 tablet (400 mg) by Oral or Feeding Tube route daily 30 tablet 1     nitroGLYcerin (NITROSTAT) 0.4 MG sublingual tablet For chest pain place 1 tablet under the tongue every 5 minutes for 3 doses. If symptoms persist 5 minutes after 1st dose call 911. 30 tablet 0     omeprazole (PRILOSEC) 20 MG DR capsule Take 20 mg by mouth daily       potassium chloride ER (KLOR-CON M) 20 MEQ CR tablet Take 80 mEq (4 tabs) in the AM and 80 mEq (4 tabs) in the PM. Take an additional 60 mEq on Wednesdays and Saturdays with HCTZ 360 tablet 3     sacubitril-valsartan (ENTRESTO) 24-26  MG per tablet Take 0.5 tablets by mouth 2 times daily 60 tablet 1     senna-docusate (SENOKOT-S/PERICOLACE) 8.6-50 MG tablet Take 1 tablet by mouth 2 times daily as needed for constipation        tamsulosin (FLOMAX) 0.4 MG capsule Take 2 capsules (0.8 mg) by mouth daily This med helps with urinary retention 30 capsule 0     warfarin ANTICOAGULANT (COUMADIN) 2 MG tablet Take by mouth daily Take 4 mg PO on Sundays and Wednesdays, and take 2 mg PO on all other days.       warfarin ANTICOAGULANT (COUMADIN) 4 MG tablet TAKE 1 TO 2 TABLETS (4 MG TO 8 MG) BY MOUTH ONCE DAILY AS DIRECTED 90 tablet 3     zolpidem (AMBIEN) 5 MG tablet Take 5 mg by mouth nightly as needed for Insomnia         Immunizations:  Immunization History   Administered Date(s) Administered     COVID-19,PF,Moderna 02/11/2021     COVID-19,PF,Pfizer (12+ Yrs) 02/11/2021, 09/01/2021, 09/22/2021     COVID-19,PF,Pfizer 12+ YRS BIVALENT Booster 10/26/2022     Flu, Unspecified 11/15/2019     Influenza (High Dose) 3 valent vaccine 10/25/2019     Pneumo Conj 13-V (2010&after) 09/06/2018       Exam:  There were no vitals taken for this visit.-virtual visit    GENERAL: Healthy, alert and no distress  EYES: Eyes grossly normal to inspection  RESP: No accessory muscle use. No audible wheeze, cough  NEURO: Alert. Oriented. Mentation and speech appropriate for age.  Reviewed 10/26/22 pictures: dressing saturation improved.    Labs:  WBC   Date Value Ref Range Status   03/19/2021 7.9 10^9/L Final     WBC Count   Date Value Ref Range Status   10/18/2022 6.9 4.0 - 11.0 10e3/uL Final       CRP Inflammation   Date Value Ref Range Status   08/17/2021 4.9 0.0 - 8.0 mg/L Final   02/16/2021 270.0 (H) 0.0 - 8.0 mg/L Final   02/15/2021 270.0 (H) 0.0 - 8.0 mg/L Final   02/11/2021 8.6 0.0 - 10.0 mg/L Final       Creatinine   Date Value Ref Range Status   10/18/2022 1.96 (H) 0.67 - 1.17 mg/dL Final   09/20/2022 2.14 (H) 0.67 - 1.17 mg/dL Final   09/11/2022 1.73 (H) 0.67 - 1.17  mg/dL Final   04/09/2021 1.6 mg/dL Final   03/19/2021 2.1 mg/dL Final   03/17/2021 2.00 (H) 0.66 - 1.25 mg/dL Final

## 2022-11-10 NOTE — TELEPHONE ENCOUNTER
[facility] 11/10 to schedule 3 month follow up with Dr. Bhatti via in-person for around 2/8/2023 per checkout notes from 11/8/2022.

## 2022-11-10 NOTE — PROGRESS NOTES
ANTICOAGULATION  MANAGEMENT     Interacting Medication Review    Interacting medication(s): Sulfamethoxazole-Trimethoprim (Bactrim) with warfarin.    Duration: Long-term    Indication: Prophylaxis    New medication?: Yes, interaction may increase INR and risk of bleeding. With Sulfamethoxazole-Trimethoprim, [facility] protocol Appendix G recommends empiric reduction of warfarin dose in therapeutic/supratherapeutic patients.        PLAN     Bon will need a 10-20% dose adjustment.  No dose adjustment since Bon will have another INR next week and he will not start Bactrim until 11/18/22        Patient was not contacted    No adjustment to anticoagulation calendar was required    Plan made per [facility] anticoagulation protocol and per LVAD protocol    Gloria Abbasi RN  Anticoagulation Clinic

## 2022-11-10 NOTE — PATIENT INSTRUCTIONS
Continue cefazolin through 10/18/22. Okay to stop treatment after the last dose on 10/18/22. Does not have a PICC line in place.   Despite the cephalexin or cefadroxil suppression he has had increased drainage. Unfortunately it is doxycycline resistant. Another option would be 1 single strength bactrim once a day (crcl 37). This is slightly lower dose than 1 DS daily but uncertain at this point where creatinine will [place]. Will discuss this option with cardiology since his creatinine is increasing. Potassium is okay. If transitioned to tmp/smx will follow labs closely.

## 2022-11-14 NOTE — PROGRESS NOTES
[facility] CORE Clinic - CardioMEMS Reading Review    November 14, 2022   Cardiomems period:10/20-11/18    CardioMEMS reviewed and routed to patient's provider, Laine BARRY NP.   Current diuretic dose: Bumex 5mg BID. Hydrochlorothiazide 75mg every Wednesday and Saturday   Current potassium chloride dose:  Potassium 80mEq BID w/ an extra 60 mEq Potassium on Wednesdays & Saturdays    Symptoms: MAP 84-79-78 patient reports feeling great and has no swelling    Weight: Last 5 weights: [phone] and today was 181.    Plan of care change per Laine BARRY: Increase Bumex to 6 mg po BID for 3 days and KCL to 80/80/40 MEQ for 3 days.    Current Threshold parameters: 21-24. Patient at 25 today.       Today's Waveform:          Readings:             [facility] Date Wedge Pressure MEMS PAD during cath  (average of the 3 values)     Sept 20, 2022 w/MEMS implant     15 mmHg   16 mmHg

## 2022-11-16 NOTE — PROGRESS NOTES
ANTICOAGULATION MANAGEMENT     Eliseo Tanner 67 year old male is on warfarin with therapeutic INR result. (Goal INR 1.7-2.3)    Recent labs: (last 7 days)     11/14/22  0000   INR 1.7*       ASSESSMENT       Source(s): Chart Review and Patient/Caregiver Call       Warfarin doses taken: Warfarin taken as instructed    Diet: No new diet changes identified    New illness, injury, or hospitalization: No    Medication/supplement changes: Bactrim start on 11/18/22 subsequent INRs may increased. With Sulfamethoxazole-Trimethoprim, [facility] protocol Appendix G recommends empiric reduction of warfarin dose in therapeutic/supratherapeutic patients.   Concurrent use of SULFAMETHOXAZOLE and WARFARIN may result in increased warfarin exposure    Signs or symptoms of bleeding or clotting: No    Previous INR: Subtherapeutic    Additional findings: None       PLAN     Recommended plan for ongoing change(s) affecting INR     Dosing Instructions: decrease your warfarin dose (13.3% change) with next INR in 8 days       Summary  As of 11/16/2022    Full warfarin instructions:  2 mg every Sat; 4 mg all other days; Starting 11/16/2022   Next INR check:  11/22/2022             Telephone call with Eliseo who verbalizes understanding and agrees to plan and who agrees to plan and repeated back plan correctly    Patient to recheck with home meter    Education provided:     Interaction IS anticipated between warfarin and Bactrim    Contact [phone] with any changes, questions or concerns.     Plan made with [facility] Pharmacist Tamiko Rosas and per LVAD protocol    PACO MARQUEZ RN  Anticoagulation Clinic  11/16/2022    _______________________________________________________________________     Anticoagulation Episode Summary     Current INR goal:  1.7-2.3   TTR:  38.3 % (11.7 mo)   Target end date:  Indefinite   Send INR reminders to:  ANTICOAG LVAD    Indications    LVAD (left ventricular assist device) present (H) [Z95.811]  Chronic systolic  congestive heart failure (H) [I50.22]  Paroxysmal atrial fibrillation (H) [I48.0]           Comments:  7/12/22 GOAL RANGE CHANGE 1.7-2.3   HM 3 placed 2/18/2020, 81mg ASA,  5/6/22 [employer] Home Meter         Anticoagulation Care Providers     Provider Role Specialty Phone number    Nader Cisneros MD Referring Cardiovascular Disease [phone]

## 2022-11-17 NOTE — TELEPHONE ENCOUNTER
[facility] Call Center    Phone Message    May a detailed message be left on voicemail: no     Reason for Call: Other: Sheila calling wanting to know the duration of pts IV treatment. Per Sheila pt started the therapy 10/20/22 and wanting to know the end date. Please advise. Thank you      Action Taken: Message routed to:  Other: ID    Travel Screening: Not Applicable

## 2022-11-17 NOTE — TELEPHONE ENCOUNTER
Spoke with Pt. He can complete his last infusion on 11/18 then the peripheral line can be removed. Asked that he get labs one week after the IV meds are done writer would send orders to the local lab to have done between 11/26-11/28.   Rx for bactrim was sent to his local pharmacy to start on Saturday.     Faxed orders to [facility] [phone].

## 2022-11-17 NOTE — PROGRESS NOTES
[facility] CORE Clinic - CardioMEMS Reading Review     November 17, 2022   Cardiomems period:10/20-11/18     CardioMEMS reviewed and routed to patient's provider, Laine BARRY NP.   Current diuretic dose: Bumex 5mg BID. Hydrochlorothiazide 75mg every Wednesday and Saturday   Current potassium chloride dose:  Potassium 80mEq BID w/ an extra 60 mEq Potassium on Wednesdays & Saturdays    Recent plan of care change:  11/14 Increase Bumex to 6 mg po BID for 3 days and KCL to 80/80/40 MEQ for 3 days.    Labs on 11/14:  Creat 1.6.  K 3.7 (see CareEverywhere)     Symptoms: No SOB. No swelling     Weight:  177 pounds.  Down from 181 on 11/14.     Plan of care change per Laine ALEGRIA:  Increase Bumex to 6 mg in AM and 5 mg in the evening.   Comeks message sent to patient.       Current Threshold parameters: 21-24.     Today's Waveform:  PAD 25    Readings:              [facility] Date Wedge Pressure MEMS PAD during cath  (average of the 3 values)      Sept 20, 2022 w/MEMS implant       15 mmHg    16 mmHg

## 2022-11-21 NOTE — PROGRESS NOTES
[facility] CORE Clinic - CardioMEMS Reading Cycle Review    November 21, 2022     CardioMEMS reviewed and routed to patient's provider, Laine Leon NP.     Current diuretic:  Bumex 6mg AM, 5mg PM, Hydrochlorothiazide 75mg every Wednesday and Saturday   Current potassium:  Potassium 80mEq BID w/ an extra 60 mEq Potassium on Wednesdays & Saturdays    Recent plan of care changes: Increased Bumex to 6mg AM and 5mg PM from 5mg BID on 11/17. Potassium to 80mEq BID from 80/60. Increased threshold from 19-22 to 21-24.   Summary of CardioMEMS Cycle 10/19/22-11/18/22:    Current Threshold parameters:  21-24    Most Recent Waveform:       Reading Ranges:     PAS: 47-55  PAD: 18-27  PA Mean: 30-41  Heart Rate: 63-82        RHC Date Wedge Pressure MEMS PAD during cath  (average of the 3 values)     Sept 20, 2022 w/MEMS implant     15 mmHg   16 mmHg

## 2022-11-21 NOTE — PROGRESS NOTES
[facility] CORE Clinic - CardioMEMS Reading Review    November 21, 2022   Cardiomems period:11/19-12/18/22    CardioMEMS reviewed and routed to patient's provider, Laine Leon NP.   Current diuretic dose: Bumex 6mg AM, 5mg PM, Hydrochlorothiazide 75mg every Wednesday and Saturday   Current potassium chloride dose:  Potassium 80mEq BID w/ an extra 60 mEq Potassium on Wednesdays & Saturdays    Symptoms: Patient reports feeling good    Weight: weights for the last four days are 178, 174, 178, 178.      Recent plan of care changes:   Increased 6mg BID for three days on 11/16 and Increased to 80/80/40 MEQ for three days during extra bumex.    Current Threshold parameters: 21-24 Patient at 23 today.      Today's Waveform:          Readings:             [facility] Date Wedge Pressure MEMS PAD during cath  (average of the 3 values)     Sept 20, 2022 w/MEMS implant     15 mmHg   16 mmHg

## 2022-11-22 NOTE — PROGRESS NOTES
Remote monitoring of Pulmonary Artery Pressures via CardioMEMS device reviewed at least weekly and as needed with nursing for dates 10/19 through 11/18. Diuretics adjusted accordingly.   Laine Leon, APRN CNP  11/22/2022

## 2022-11-25 NOTE — PROGRESS NOTES
[facility] CORE Clinic - CardioMEMS Reading Review    November 25, 2022, 1042   CardioMems period: 11/19 - 12/18      CardioMEMS reviewed and routed to patient's provider, Laine BARRY NP.     Current diuretic: Bumex 5 mg BID.  Hydrochlorothiazide 75 mg every Wednesday and Saturday      Current potassium chloride dose:  Potassium 80mEq BID w/ an extra 60 mEq Potassium on Wednesdays & Saturdays    Symptoms: None  Weights: Today, 182, yesterday 181    Changes to plan of care today: None.  Follow up Monday    Current Threshold parameters: 21 - 24    Today's Waveform:       Readings:         [facility] Date Wedge Pressure MEMS PAD during cath  (average of the 3 values)     Sept 20, 2022 w/MEMS implant     15 mmHg   16 mmHg

## 2022-11-28 NOTE — PROGRESS NOTES
[facility] CORE Clinic - CardioMEMS Reading Review    November 28, 2022, 0852   CardioMems period: 11/19 - 12/18      CardioMEMS reviewed and routed to patient's provider, Laine BARRY NP.     Current diuretic: Bumex 5 mg BID.  Hydrochlorothiazide 75 mg every Wednesday and Saturday      Current potassium chloride dose:  Potassium 80mEq BID w/ an extra 60 mEq Potassium on Wednesdays & Saturdays    Symptoms: None.  Pt does report and increase in sodium intake over the holiday.  Weights: Today, going back: 184, 184, 183    Changes to plan of care today: No changes    Current Threshold parameters: 21 - 24    Today's Waveform:       Readings:         [facility] Date Wedge Pressure MEMS PAD during cath  (average of the 3 values)     Sept 20, 2022 w/MEMS implant     15 mmHg   16 mmHg

## 2022-12-01 NOTE — PROGRESS NOTES
Date: 12/1/2022    Time of Call: 4:42 PM     [ TORB ] Ordering provider: Edward DHILLON  Order: Increase Bumex to 6 mg po BID with an additional 20 MEQ Potassium daily for the next 3 days.      Order received by: writer     Follow-up/additional notes:    Called to discuss with pt.  Pt informed writer that he has only been taking Potassium 60 mEq BID, not the 80 mEq BID that was ordered.    Dosing re-discussed with provider.  Per Laine BARRY NP: Pt to take Potassium As originally prescribed.

## 2022-12-01 NOTE — PROGRESS NOTES
[facility] CORE Clinic - CardioMEMS Reading Review    December 1, 2022   Cardiomems period: 11/19 - 12/18    CardioMEMS reviewed and routed to patient's provider, Laine BARRY NP.   Current diuretic dose: Bumex 5 mg BID, Hydrochlorothiazide 75 mg every Wednesday and Saturday   Current potassium chloride dose:  Potassium 80mEq BID w/ an extra 60 mEq Potassium on Wednesdays & Saturdays    Symptoms: Patient states feeling Okay.  Weight: Today: 186, Yesterdays 189     Plan of care changes: Increase Bumex to 6 mg PO BID and take an additional 20 MEQ of Potassium daily for the next 3 days.     Patient was only taking 60 mEq of Potassium - patient to go to prescribed 80 mEq as originally prescribed.    Current Threshold parameters: 21 - 24      Today's Waveform:          Readings:             [facility] Date Wedge Pressure MEMS PAD during cath  (average of the 3 values)     Sept 20, 2022 w/MEMS implant     15 mmHg   16 mmHg

## 2022-12-05 NOTE — PROGRESS NOTES
[facility] CORE Clinic - CardioMEMS Reading Review    December 5, 2022, 1621   CardioMems period: 11/19 - 12/18      CardioMEMS reviewed and routed to patient's provider, Laine BARRY NP.     Current diuretic: Bumex 5 mg BID.  Hydrochlorothiazide 75 mg every Wednesday and Saturday      Current potassium chloride dose:  Potassium 80mEq BID w/ an extra 60 mEq Potassium on Wednesdays & Saturdays    Symptoms: None  Weights: Today, going back: 190, 188, 186    Changes to plan of care today: Increase Bumex to 6mg BID with Potassium 80 mEq BID    Current Threshold parameters: 21 - 24    Today's Waveform:       Readings:         [facility] Date Wedge Pressure MEMS PAD during cath  (average of the 3 values)     Sept 20, 2022 w/MEMS implant     15 mmHg   16 mmHg

## 2022-12-06 NOTE — PROGRESS NOTES
December 7, 2022     Eliseo Tanner is a 67 year old male with chronic systolic heart failure secondary to NICM now s/p HM 3 on 2/18, moderate CAD, HTN, ABHINAV on CPAP, DM2, CKD Stage III, ANA. His HM3 post-op course was complicated by retrosternal hematoma and bleeding in the lungs, RV failure,VT in ICU now on amiodarone and Afib w/AVR S/p DCCV on 2/28. He had pre-op proteus and enterococcus bacteremia and he has had MSSA bacteremia as well, s/p abx; later on had LDH elevation 2/2 to legionella c/b renal failure requiring temporary dialysis (off iHD since 3/10/2021).  He presents today for LVAD follow-up.     He was last seen in LVAD clinic by Dr. Cisneros 9/2022. Speed had recently been increased from 5500 to 5800 and he had been switched over to bumex. He had also had a recent cardiomems placed.     This visit  Patient here for follow-up visit today.  He is here with his wife.  Most of the history is obtained from his wife.  They note that there has been significantly increased drainage recently from the driveline site, it soaks the dressing easily within half a day.  It is greenish in color with some small fluid.  This is new since patient has stopped the IV antibiotics recently.  He is currently on Bactrim however again the drainage has increased significantly since.  Overall denies any dizziness lightheadedness falls palpitations no fevers or chills.  No strokelike symptoms.  No bleeding.  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)      LVAD (left ventricular assist device) present (H)      FAMILY HISTORY:  No relevant past family history    SOCIAL HISTORY:  Social History     Socioeconomic History     Marital status:    Tobacco Use     Smoking status: Former      Types: Cigarettes     Quit date: 1994     Years since quittin.6     Smokeless tobacco: Never   Substance and Sexual Activity     Alcohol use: Not Currently     Drug use: Never     CURRENT MEDICATIONS:  Current Outpatient Medications   Medication     allopurinol (ZYLOPRIM) 100 MG tablet     amiodarone (PACERONE) 200 MG tablet     amLODIPine (NORVASC) 5 MG tablet     aspirin (ASA) 81 MG EC tablet     atorvastatin (LIPITOR) 20 MG tablet     B Complex-C-Folic Acid (RENAL) 1 MG CAPS     bumetanide (BUMEX) 1 MG tablet     cephALEXin (KEFLEX) 500 MG capsule     co-enzyme Q-10 200 MG CAPS     dapagliflozin (FARXIGA) 5 MG TABS tablet     finasteride (PROSCAR) 5 MG tablet     FLUoxetine (PROZAC) 10 MG capsule     hydrALAZINE (APRESOLINE) 100 MG tablet     hydrochlorothiazide (HYDRODIURIL) 25 MG tablet     insulin glargine (BASAGLAR KWIKPEN) 100 UNIT/ML pen     insulin pen needle (32G X 4 MM) 32G X 4 MM miscellaneous     isosorbide mononitrate (IMDUR) 30 MG 24 hr tablet     levothyroxine (SYNTHROID/LEVOTHROID) 75 MCG tablet     magnesium oxide (MAG-OX) 400 MG tablet     nitroGLYcerin (NITROSTAT) 0.4 MG sublingual tablet     omeprazole (PRILOSEC) 20 MG DR capsule     potassium chloride ER (KLOR-CON M) 20 MEQ CR tablet     sacubitril-valsartan (ENTRESTO) 24-26 MG per tablet     senna-docusate (SENOKOT-S/PERICOLACE) 8.6-50 MG tablet     sulfamethoxazole-trimethoprim (BACTRIM) 400-80 MG tablet     tamsulosin (FLOMAX) 0.4 MG capsule     warfarin ANTICOAGULANT (COUMADIN) 2 MG tablet     warfarin ANTICOAGULANT (COUMADIN) 4 MG tablet     zolpidem (AMBIEN) 5 MG tablet     No current facility-administered medications for this visit.     ROS:   Constitutional: No fever, chills, or sweats.   ENT: No visual disturbance, ear ache, epistaxis, sore throat.   Allergies/Immunologic: Negative.   Respiratory: No cough, hemoptysis.   Cardiovascular: As per HPI.   GI: No nausea, vomiting, hematemesis, melena, or hematochezia.   : No  urinary frequency, dysuria, or hematuria.   Integument: Negative.   Psychiatric: Pleasant, no major depression noted  Neuro: No focal neurological deficits noted  Endocrinology: Negative.   Musculoskeletal: As per HPI.      EXAM:  Patient's map today with the LVAD is 80 mmHg.  Heart rate was 74 bpm oxygen saturation is 99%  GENERAL: Appears comfortable, in no acute distress.   HEENT: Eye symmetrical, no discharge or icterus bilaterally. Mucous membranes moist and without lesions.  CV: Hum of HM3, occasional S1S2. JVP ~11.   RESPIRATORY: Respirations regular, even, and unlabored. Lungs CTA throughout.   GI: Soft, non distended with normoactive bowel sounds. No tenderness, rebound, guarding.   EXTREMITIES: Mild to b/l peripheral edema. All extremities are warm and well perfused  NEUROLOGIC: Alert and interacting appropriately. No focal deficits.   MUSCULOSKELETAL: No joint swelling or tenderness.   SKIN: No jaundice. No rashes or lesions. Driveline dressing c/d/i.     Labs:  Lab Results   Component Value Date    WBC 6.9 10/18/2022    HGB 12.3 (L) 10/18/2022    HCT 37.4 (L) 10/18/2022     10/18/2022     10/18/2022    POTASSIUM 4.3 10/18/2022    CHLORIDE 90 (L) 11/28/2022    CO2 28 10/18/2022    BUN 63.3 (H) 10/18/2022    CR 1.96 (H) 10/18/2022    GLC 98 10/18/2022    SED 72 (H) 02/16/2021    DD 2.71 (H) 10/18/2022    NTBNPI 6,484 (H) 09/02/2022    NTBNP 4,416 (H) 02/10/2022    TROPI 0.377 (HH) 02/15/2021    AST 93 (H) 10/18/2022    ALT 88 (H) 10/18/2022    ALKPHOS 175 (H) 10/18/2022    BILITOTAL 0.5 10/18/2022    INR 1.7 (L) 11/23/2022     Echo 2/22/2021: LVEF 15-20%; RV appears at least moderate to severely reduced and grossly dilated; aortic valve opens intermittently   CHAMP 3/3/2021: LVEF 10-20%; moderate to severe RV dilation with severely reduced RV function; aortic valve opens partially with each beat, mild to moderate AI, mild to moderatd MR  Echo 2/10/2022: LVEF 10-15%, moderate RV dilated and  moderately reduced function, aortic valve opens with each beat, trace AI, mild MR     [facility] 09/20/2022:  MAP 75  RA --/--/12  RV 42/11  PA 42/16/26  PCWP --/--/15  PA Sat 67%  Jane CO/CI 5.3/2.7   dynes  PVR 2.1 HUSAIN    [facility] 2/13/2022:  RA: 15  RV: 63/15  PA: 62/22 (35)  PCWP: 20  TD CI/CO: 2.59/5.39  Jane CI/CO: 2.17/4.53  PVR: 2.8 HUSAIN      [facility] 9/20/2022:  MAP 75  RA --/--/12  RV 42/11  PA 42/16/26  PCWP --/--/15  PA Sat 67%  Jane CO/CI 5.3/2.7   dynes  PVR 2.1 HUSAIN  CardioMems placement      Assessment and Plan:   Eliseo Tanner is a 67 year old male with chronic systolic heart failure secondary to NICM now s/p HM 3 on 2/18, moderate CAD, HTN, ABHINAV on CPAP, DM2, CKD Stage III, ANA. His HM3 post-op course was complicated by retrosternal hematoma and bleeding in the lungs, RV failure,VT in ICU now on amiodarone and Afib w/AVR S/p DCCV on 2/28. He had pre-op proteus and enterococcus bacteremia and he has had MSSA bacteremia as well, s/p abx; later on had LDH elevation 2/2 to legionella c/b renal failure requiring temporary dialysis (off iHD since 3/10/2021).  He presents today for LVAD follow-up.  Major issue today is related to the driveline exit site drainage.  It is greenish in color has some smell to it and that it definitely seems like bacterial infection.  This has started since patient stopped the IV antibiotics and transitioned over to orals.  He also appears somewhat volume overloaded as it is confirmed by the CardioMEMS numbers.  Given the increased drainage with the stopped IV antibiotics I do believe patient will need urgent further work-up for his infection.  I am unable to exclude an abscess at the driveline site and I am not sure how deep the infection goes.  As such we will ask him to come to the [place] ER where we will get an ultrasound as well as a CT of the driveline site to see if there is any drainable abscess.  We will also discussed with our infectious disease colleagues to  choose the best antibiotics for him.  He is otherwise hemodynamically stable, I think it is okay for them to go home and drive to [place] to [place] tomorrow stable planning.  Team updated.      Chronic SCHF secondary to NICM s/p HM III with RV dysfunction. Implanted 2/18 and complicated by bleeding.   Stage D, NYHA Class IIIA  ACEi/ARB Hydralazine 100 mg TID. Continue amlodipine to 10 mg daily  BB Has been deferred given RHF, deferred d/t bradycardia now  Aldosterone antagonist deferred while other medical therapy is prioritized  SCD prophylaxis ICD  Fluid status Hypervolemic:  Will manage tomorrow after admission  MAP: Goal 65-85, he is-80 today  LDH: 283, stable  Anticoagulation: Coumadin per pharmacy.  Goal 1.8-2.5 for recurrent nosebleeding, INR 1.39- defer bridge, dosing per ACC team  Antiplatelet: ASA 81 mg po daily  Cardiomems goal: Early since implant, working on refining his goal currently     VAD Interrogation on October 20, 2022. VAD interrogation reviewed with VAD coordinator. Agree with findings. Frequent PI events. No power spikes, speed drops, or other findings suspicious of pump malfunction noted.     CKD stage IV  Baseline has increased over the last year. Current B/l is around 1.8-2.2.   - Improved today to 1.96 (F2.4)  - Diuretic management as above  - BMP next week (increased bumex, refining cardiomems goal)     MSSA Driveline infection, ongoing drainage  - Patient saw Dr. Bhatti today, management per her and the ID team  - On Keflex for now      A. Fib.  Sinus Bradycardia   History of Afib w/ RVR and aberrancy vs. NATHALIA vs. AT vs. Slow VT. S/p DCCV on 2/28 back into sinus rhythm. Has been tachycardic in the past but now in sinus bradycardia with increased RV pacing.  - Continue on amiodarone  - Increased lower pacer rate from 40 to 60 and turned on rate response at a prior appointment     HTN, within goal today  - Continue hydralazine and amlodipine     History of HIT  - Avoid heparin products       Follow up   Per schedule    I appreciate the opportunity to participate in the care of Eliseo Tanner . Please do not hesitate to contact me with any further questions.    Sincerely,   Nader Cisneros MD  [facility] Division of Cardiology

## 2022-12-06 NOTE — PROGRESS NOTES
ANTICOAGULATION MANAGEMENT     Eliseo Tanner 67 year old male is on warfarin with therapeutic INR result. (Goal INR 1.7-2.3)    Recent labs: (last 7 days)     12/01/22  0000   INR 1.8*       ASSESSMENT       Source(s): Chart Review and Patient/Caregiver Call       Warfarin doses taken: Warfarin taken as instructed    Diet: No new diet changes identified    New illness, injury, or hospitalization: No    Medication/supplement changes: None noted    Signs or symptoms of bleeding or clotting: No    Previous INR: Therapeutic last 2(+) visits    Additional findings: None       PLAN     Recommended plan for no diet, medication or health factor changes affecting INR     Dosing Instructions: Continue your current warfarin dose with next INR in 2 weeks       Summary  As of 12/6/2022    Full warfarin instructions:  4 mg every day; Starting 12/6/2022   Next INR check:  12/15/2022             Telephone call with Eliseo who verbalizes understanding and agrees to plan and who agrees to plan and repeated back plan correctly    Patient to recheck with home meter    Education provided:     Contact [phone] with any changes, questions or concerns.     Plan made per ACC anticoagulation protocol and per LVAD protocol    PACO MARQUEZ RN  Anticoagulation Clinic  12/6/2022    _______________________________________________________________________     Anticoagulation Episode Summary     Current INR goal:  1.7-2.3   TTR:  39.1 % (11.6 mo)   Target end date:  Indefinite   Send INR reminders to:  ANTICOAG LVAD    Indications    LVAD (left ventricular assist device) present (H) [Z95.811]  Chronic systolic congestive heart failure (H) [I50.22]  Paroxysmal atrial fibrillation (H) [I48.0]           Comments:  7/12/22 GOAL RANGE CHANGE 1.7-2.3   HM 3 placed 2/18/2020, 81mg ASA,  5/6/22 [employer]eter         Anticoagulation Care Providers     Provider Role Specialty Phone number    Nader Cisneros MD Referring Cardiovascular Disease  [phone]

## 2022-12-07 NOTE — TELEPHONE ENCOUNTER
Patients wife called today stating that the driveline is goopy and drainage is getting worse again. Started within the week of stopping IV antibiotics and has gotten worse.     Going to get culture in [place] this afternoon at 3:15pm.     Been on Bactrim oral since x2 weeks.     To Dr. Bhatti as LVAD coordinator already called to get orders sent to [place].     Wife wants to know if they are going to start IV antibiotics again.     Vinnie Olivera RN  Infectious Disease 10:21 AM 12/07/22

## 2022-12-07 NOTE — NURSING NOTE
Chief Complaint   Patient presents with     Follow Up     Heart Failure     Unable to reach patient to do pre-check in.  Medications updated per Care Everywhere.  Pharmacy loaded per preference in chart.  Vitals to be taken by Sunil PETERSON.

## 2022-12-07 NOTE — LETTER
12/7/2022      RE: Eliseo Tanner  [address] [place] [address]       Dear Colleague,    Thank you for the opportunity to participate in the care of your patient, Eliseo Tanner, at the [facility] [place] at [facility]. Please see a copy of my visit note below.    December 7, 2022     Eliseo Tanner is a 67 year old male with chronic systolic heart failure secondary to NICM now s/p HM 3 on 2/18, moderate CAD, HTN, ABHINAV on CPAP, DM2, CKD Stage III, ANA. His HM3 post-op course was complicated by retrosternal hematoma and bleeding in the lungs, RV failure,VT in ICU now on amiodarone and Afib w/AVR S/p DCCV on 2/28. He had pre-op proteus and enterococcus bacteremia and he has had MSSA bacteremia as well, s/p abx; later on had LDH elevation 2/2 to legionella c/b renal failure requiring temporary dialysis (off iHD since 3/10/2021).  He presents today for LVAD follow-up.     He was last seen in LVAD clinic by Dr. Cisneros 9/2022. Speed had recently been increased from 5500 to 5800 and he had been switched over to bumex. He had also had a recent cardiomems placed.     This visit  Patient here for follow-up visit today.  He is here with his wife.  Most of the history is obtained from his wife.  They note that there has been significantly increased drainage recently from the driveline site, it soaks the dressing easily within half a day.  It is greenish in color with some small fluid.  This is new since patient has stopped the IV antibiotics recently.  He is currently on Bactrim however again the drainage has increased significantly since.  Overall denies any dizziness lightheadedness falls palpitations no fevers or chills.  No strokelike symptoms.  No bleeding.  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)      LVAD (left ventricular assist device) present (H)      FAMILY HISTORY:  No relevant past family history    SOCIAL HISTORY:  Social History     Socioeconomic History     Marital status:    Tobacco Use     Smoking status: Former     Types: Cigarettes     Quit date: 1994     Years since quittin.6     Smokeless tobacco: Never   Substance and Sexual Activity     Alcohol use: Not Currently     Drug use: Never     CURRENT MEDICATIONS:  Current Outpatient Medications   Medication     allopurinol (ZYLOPRIM) 100 MG tablet     amiodarone (PACERONE) 200 MG tablet     amLODIPine (NORVASC) 5 MG tablet     aspirin (ASA) 81 MG EC tablet     atorvastatin (LIPITOR) 20 MG tablet     B Complex-C-Folic Acid (RENAL) 1 MG CAPS     bumetanide (BUMEX) 1 MG tablet     cephALEXin (KEFLEX) 500 MG capsule     co-enzyme Q-10 200 MG CAPS     dapagliflozin (FARXIGA) 5 MG TABS tablet     finasteride (PROSCAR) 5 MG tablet     FLUoxetine (PROZAC) 10 MG capsule     hydrALAZINE (APRESOLINE) 100 MG tablet     hydrochlorothiazide (HYDRODIURIL) 25 MG tablet     insulin glargine (BASAGLAR KWIKPEN) 100 UNIT/ML pen     insulin pen needle (32G X 4 MM) 32G X 4 MM miscellaneous     isosorbide mononitrate (IMDUR) 30 MG 24 hr tablet     levothyroxine (SYNTHROID/LEVOTHROID) 75 MCG tablet     magnesium oxide (MAG-OX) 400 MG tablet     nitroGLYcerin (NITROSTAT) 0.4 MG sublingual tablet     omeprazole (PRILOSEC) 20 MG DR capsule     potassium chloride ER (KLOR-CON M) 20 MEQ CR tablet     sacubitril-valsartan (ENTRESTO) 24-26 MG per tablet     senna-docusate (SENOKOT-S/PERICOLACE) 8.6-50 MG tablet     sulfamethoxazole-trimethoprim (BACTRIM) 400-80 MG tablet     tamsulosin (FLOMAX) 0.4 MG capsule     warfarin ANTICOAGULANT (COUMADIN) 2 MG tablet     warfarin ANTICOAGULANT (COUMADIN) 4 MG tablet     zolpidem (AMBIEN) 5 MG tablet     No current  facility-administered medications for this visit.     ROS:   Constitutional: No fever, chills, or sweats.   ENT: No visual disturbance, ear ache, epistaxis, sore throat.   Allergies/Immunologic: Negative.   Respiratory: No cough, hemoptysis.   Cardiovascular: As per HPI.   GI: No nausea, vomiting, hematemesis, melena, or hematochezia.   : No urinary frequency, dysuria, or hematuria.   Integument: Negative.   Psychiatric: Pleasant, no major depression noted  Neuro: No focal neurological deficits noted  Endocrinology: Negative.   Musculoskeletal: As per HPI.      EXAM:  Patient's map today with the LVAD is 80 mmHg.  Heart rate was 74 bpm oxygen saturation is 99%  GENERAL: Appears comfortable, in no acute distress.   HEENT: Eye symmetrical, no discharge or icterus bilaterally. Mucous membranes moist and without lesions.  CV: Hum of HM3, occasional S1S2. JVP ~11.   RESPIRATORY: Respirations regular, even, and unlabored. Lungs CTA throughout.   GI: Soft, non distended with normoactive bowel sounds. No tenderness, rebound, guarding.   EXTREMITIES: Mild to b/l peripheral edema. All extremities are warm and well perfused  NEUROLOGIC: Alert and interacting appropriately. No focal deficits.   MUSCULOSKELETAL: No joint swelling or tenderness.   SKIN: No jaundice. No rashes or lesions. Driveline dressing c/d/i.     Labs:  Lab Results   Component Value Date    WBC 6.9 10/18/2022    HGB 12.3 (L) 10/18/2022    HCT 37.4 (L) 10/18/2022     10/18/2022     10/18/2022    POTASSIUM 4.3 10/18/2022    CHLORIDE 90 (L) 11/28/2022    CO2 28 10/18/2022    BUN 63.3 (H) 10/18/2022    CR 1.96 (H) 10/18/2022    GLC 98 10/18/2022    SED 72 (H) 02/16/2021    DD 2.71 (H) 10/18/2022    NTBNPI 6,484 (H) 09/02/2022    NTBNP 4,416 (H) 02/10/2022    TROPI 0.377 (HH) 02/15/2021    AST 93 (H) 10/18/2022    ALT 88 (H) 10/18/2022    ALKPHOS 175 (H) 10/18/2022    BILITOTAL 0.5 10/18/2022    INR 1.7 (L) 11/23/2022     Echo 2/22/2021: LVEF  15-20%; RV appears at least moderate to severely reduced and grossly dilated; aortic valve opens intermittently   CHAMP 3/3/2021: LVEF 10-20%; moderate to severe RV dilation with severely reduced RV function; aortic valve opens partially with each beat, mild to moderate AI, mild to moderatd MR  Echo 2/10/2022: LVEF 10-15%, moderate RV dilated and moderately reduced function, aortic valve opens with each beat, trace AI, mild MR     [facility] 09/20/2022:  MAP 75  RA --/--/12  RV 42/11  PA 42/16/26  PCWP --/--/15  PA Sat 67%  Jane CO/CI 5.3/2.7   dynes  PVR 2.1 HUSAIN    [facility] 2/13/2022:  RA: 15  RV: 63/15  PA: 62/22 (35)  PCWP: 20  TD CI/CO: 2.59/5.39  Jane CI/CO: 2.17/4.53  PVR: 2.8 HUSAIN      [facility] 9/20/2022:  MAP 75  RA --/--/12  RV 42/11  PA 42/16/26  PCWP --/--/15  PA Sat 67%  Jane CO/CI 5.3/2.7   dynes  PVR 2.1 HUSAIN  CardioMems placement      Assessment and Plan:   Eliseo Tanner is a 67 year old male with chronic systolic heart failure secondary to NICM now s/p HM 3 on 2/18, moderate CAD, HTN, ABHINAV on CPAP, DM2, CKD Stage III, ANA. His HM3 post-op course was complicated by retrosternal hematoma and bleeding in the lungs, RV failure,VT in ICU now on amiodarone and Afib w/AVR S/p DCCV on 2/28. He had pre-op proteus and enterococcus bacteremia and he has had MSSA bacteremia as well, s/p abx; later on had LDH elevation 2/2 to legionella c/b renal failure requiring temporary dialysis (off iHD since 3/10/2021).  He presents today for LVAD follow-up.  Major issue today is related to the driveline exit site drainage.  It is greenish in color has some smell to it and that it definitely seems like bacterial infection.  This has started since patient stopped the IV antibiotics and transitioned over to orals.  He also appears somewhat volume overloaded as it is confirmed by the CardioMEMS numbers.  Given the increased drainage with the stopped IV antibiotics I do believe patient will need urgent further work-up for his  infection.  I am unable to exclude an abscess at the driveline site and I am not sure how deep the infection goes.  As such we will ask him to come to the [place] ER where we will get an ultrasound as well as a CT of the driveline site to see if there is any drainable abscess.  We will also discussed with our infectious disease colleagues to choose the best antibiotics for him.  He is otherwise hemodynamically stable, I think it is okay for them to go home and drive to [place] to [place] tomorrow stable planning.  Team updated.      Chronic SCHF secondary to NICM s/p HM III with RV dysfunction. Implanted 2/18 and complicated by bleeding.   Stage D, NYHA Class IIIA  ACEi/ARB Hydralazine 100 mg TID. Continue amlodipine to 10 mg daily  BB Has been deferred given RHF, deferred d/t bradycardia now  Aldosterone antagonist deferred while other medical therapy is prioritized  SCD prophylaxis ICD  Fluid status Hypervolemic:  Will manage tomorrow after admission  MAP: Goal 65-85, he is-80 today  LDH: 283, stable  Anticoagulation: Coumadin per pharmacy.  Goal 1.8-2.5 for recurrent nosebleeding, INR 1.39- defer bridge, dosing per ACC team  Antiplatelet: ASA 81 mg po daily  Cardiomems goal: Early since implant, working on refining his goal currently     VAD Interrogation on October 20, 2022. VAD interrogation reviewed with VAD coordinator. Agree with findings. Frequent PI events. No power spikes, speed drops, or other findings suspicious of pump malfunction noted.     CKD stage IV  Baseline has increased over the last year. Current B/l is around 1.8-2.2.   - Improved today to 1.96 (F2.4)  - Diuretic management as above  - BMP next week (increased bumex, refining cardiomems goal)     MSSA Driveline infection, ongoing drainage  - Patient saw Dr. Bhatti today, management per her and the ID team  - On Keflex for now      A. Fib.  Sinus Bradycardia   History of Afib w/ RVR and aberrancy vs. NATHALIA vs. AT vs. Slow VT. S/p DCCV on  2/28 back into sinus rhythm. Has been tachycardic in the past but now in sinus bradycardia with increased RV pacing.  - Continue on amiodarone  - Increased lower pacer rate from 40 to 60 and turned on rate response at a prior appointment     HTN, within goal today  - Continue hydralazine and amlodipine     History of HIT  - Avoid heparin products      Follow up   Per schedule    I appreciate the opportunity to participate in the care of Eliseo Tanner . Please do not hesitate to contact me with any further questions.    Sincerely,   Nader Cisneros MD  [facility] Division of Cardiology

## 2022-12-08 NOTE — ED PROVIDER NOTES
[facility] DEPARTMENT ([facility])  December 8, 2022     History     Chief Complaint   Patient presents with     Wound Infection     HPI  Eliseo Tanner is a 67 year old male with a past medical history including chronic systolic CHF secondary to NICM now s/p HM 3 on 2/18, CAD, HTN, DM2, ABHINAV on CPAP, stage IV CKD who presents to the Emergency Department to be admitted to the hospital for volume overload and suspected driveline infection.  He does report that he saw his cardiologist in [place] yesterday and was referred into the emergency department today because they could not arrange a direct hospital admission.  He does report that he has been having increasing discharge from his driveline site and this has been worsening since he stopped a 6-week course of IV antibiotics several weeks ago.  Denies high fevers.  Does report weight gain, increased leg swelling and some increased exertional dyspnea.      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 60330 and ;  Surgeon: Dalton Baeza MD;  Location:  HEART CARDIAC CATH LAB     CV CENTRAL VENOUS CATHETER PLACEMENT N/A 2/13/2020    Procedure: Central Venous Catheter Placement;  Surgeon: Chente Moss MD;  Location:  HEART CARDIAC CATH LAB     CV INTRA AORTIC BALLOON N/A  2/7/2020    Procedure: Intra-Aortic Balloon Pump Insertion;  Surgeon: Jose Baldwin MD;  Location:  HEART CARDIAC CATH LAB     CV INTRA AORTIC BALLOON N/A 2/13/2020    Procedure: Intra-Aortic Balloon Pump Insertion;  Surgeon: Chente Moss MD;  Location:  HEART CARDIAC CATH LAB     CV RIGHT HEART CATH MEASUREMENTS RECORDED N/A 9/21/2020    Procedure: CV RIGHT HEART CATH;  Surgeon: Dalton Baeza MD;  Location:  HEART CARDIAC CATH LAB     CV RIGHT HEART CATH MEASUREMENTS RECORDED N/A 1/7/2021    Procedure: Right Heart Cath;  Surgeon: Chun Ball MD;  Location:  HEART CARDIAC CATH LAB     CV RIGHT HEART CATH MEASUREMENTS RECORDED N/A 2/10/2022    Procedure: CV RIGHT HEART CATH;  Surgeon: Dalton Baeza MD;  Location:  HEART CARDIAC CATH LAB     CV RIGHT HEART CATH MEASUREMENTS RECORDED N/A 9/20/2022    Procedure: Right Heart Catheterization [[id number]];  Surgeon: Dalton Baeza MD;  Location:  HEART CARDIAC CATH LAB     CV SWAN LUCIANA PROCEDURE N/A 2/13/2020    Procedure: [place] Procedure;  Surgeon: Chente Moss MD;  Location:  HEART CARDIAC CATH LAB     EP ICD Bilateral 3/16/2020    Procedure: EP ICD;  Surgeon: Dali Day MD;  Location:  HEART CARDIAC CATH LAB     INSERT VENTRICULAR ASSIST DEVICE LEFT (HEARTMATE II) N/A 2/18/2020    Procedure: INSERTION, LEFT VENTRICULAR ASSIST DEVICE (HEARTMATE III);  Surgeon: Mac Jaramillo MD;  Location:  OR     IR CVC TUNNEL PLACEMENT > 5 YRS OF AGE  2/23/2021     IR CVC TUNNEL REMOVAL RIGHT  3/16/2021     THORACOSCOPY Right 3/6/2020    Procedure: RIGHT VIDEO-ASSISTED THORASCOPIC SURGERY, EVACUATION OF HEMOTHORAX, PLACEMENT OF CHEST TUBES;  Surgeon: William Gan MD;  Location:  OR     No current outpatient medications on file.    Allergies   Allergen Reactions     Heparin      HIT screen positive 2/14/20, reflex DAVINA negative; however heme recommended treating as if positive  HIT screen  negative 20     Oxycodone Itching and Other (See Comments)     Chlorhexidine Rash     Family History  No family history on file.  Social History   Social History     Tobacco Use     Smoking status: Former     Types: Cigarettes     Quit date: 1994     Years since quittin.7     Smokeless tobacco: Never   Substance Use Topics     Alcohol use: Not Currently     Drug use: Never      Past medical history, past surgical history, medications, allergies, family history, and social history were reviewed with the patient. No additional pertinent items.       Review of Systems  A complete review of systems was performed with pertinent positives and negatives noted in the HPI, and all other systems negative.    Physical Exam   BP: 104/68  Pulse: 80  Temp: 98.2  F (36.8  C)  Resp: 18  Weight: 86.2 kg (190 lb)  SpO2: 99 %  Physical Exam  Vitals and nursing note reviewed.   Constitutional:       General: He is not in acute distress.     Appearance: He is well-developed. He is not ill-appearing, toxic-appearing or diaphoretic.   HENT:      Head: Normocephalic and atraumatic.      Mouth/Throat:      Lips: Pink.      Mouth: Mucous membranes are moist.      Pharynx: Oropharynx is clear. No oropharyngeal exudate.   Eyes:      General: Lids are normal. No scleral icterus.     Extraocular Movements: Extraocular movements intact.      Right eye: No nystagmus.      Left eye: No nystagmus.      Conjunctiva/sclera: Conjunctivae normal.      Pupils: Pupils are equal, round, and reactive to light.   Neck:      Thyroid: No thyromegaly.      Vascular: No JVD.      Trachea: No tracheal deviation.   Cardiovascular:      Rate and Rhythm: Normal rate and regular rhythm.      Comments: LVAD noises predominate.  Pulmonary:      Effort: Pulmonary effort is normal. No respiratory distress.      Breath sounds: Normal breath sounds.   Chest:      Comments: Driveline dressing taken down with LVAD coordinator at bedside.  Small amount of  purulent discharge noted at driveline site.  This was cultured.  Abdominal:      General: Bowel sounds are normal. There is no distension.      Palpations: Abdomen is soft. There is no mass.      Tenderness: There is no abdominal tenderness. There is no guarding or rebound.   Musculoskeletal:         General: No tenderness. Normal range of motion.      Cervical back: Normal range of motion and neck supple. No erythema or rigidity.      Right lower leg: Edema present.      Left lower leg: Edema present.   Lymphadenopathy:      Cervical: No cervical adenopathy.   Skin:     General: Skin is warm and dry.      Capillary Refill: Capillary refill takes less than 2 seconds.      Coloration: Skin is not pale.      Findings: No erythema or rash.   Neurological:      Mental Status: He is alert and oriented to person, place, and time.      Cranial Nerves: No cranial nerve deficit.      Sensory: No sensory deficit.      Motor: Motor function is intact.   Psychiatric:         Mood and Affect: Mood and affect normal.         Speech: Speech normal.         Behavior: Behavior normal.         ED Course      Procedures           Labs Ordered and Resulted from Time of ED Arrival to Time of ED Departure   INR - Abnormal       Result Value    INR 2.19 (*)    COMPREHENSIVE METABOLIC PANEL - Abnormal    Sodium 133 (*)     Potassium 3.9      Chloride 95 (*)     Carbon Dioxide (CO2) 22      Anion Gap 16 (*)     Urea Nitrogen 52.1 (*)     Creatinine 1.78 (*)     Calcium 8.9      Glucose 165 (*)     Alkaline Phosphatase 330 (*)      (*)      (*)     Protein Total 7.5      Albumin 3.5      Bilirubin Total 0.6      GFR Estimate 41 (*)    CRP INFLAMMATION - Abnormal    CRP Inflammation 31.50 (*)    ERYTHROCYTE SEDIMENTATION RATE AUTO - Abnormal    Erythrocyte Sedimentation Rate 49 (*)    CBC WITH PLATELETS AND DIFFERENTIAL - Abnormal    WBC Count 8.7      RBC Count 4.26 (*)     Hemoglobin 12.9 (*)     Hematocrit 40.0      MCV 94       MCH 30.3      MCHC 32.3      RDW 16.7 (*)     Platelet Count 248      % Neutrophils 75      % Lymphocytes 8      % Monocytes 12      % Eosinophils 2      % Basophils 1      % Immature Granulocytes 2      NRBCs per 100 WBC 0      Absolute Neutrophils 6.6      Absolute Lymphocytes 0.7 (*)     Absolute Monocytes 1.0      Absolute Eosinophils 0.1      Absolute Basophils 0.0      Absolute Immature Granulocytes 0.1      Absolute NRBCs 0.0     CBC WITH PLATELETS - Abnormal    WBC Count 8.6      RBC Count 4.35 (*)     Hemoglobin 13.3      Hematocrit 40.3      MCV 93      MCH 30.6      MCHC 33.0      RDW 16.6 (*)     Platelet Count 251     INR - Abnormal    INR 2.08 (*)    AEROBIC BACTERIAL CULTURE ROUTINE - Abnormal    Gram Stain Result 1+ Gram positive cocci (*)     Gram Stain Result 4+ WBC seen (*)    MAGNESIUM - Normal    Magnesium 2.1         Assessments & Plan (with Medical Decision Making)     This patient presented to the emergency department for direct admission.  I did speak with cardiology and patient will be admitted to their service for diuresis and treatment of suspected driveline infection.  He does not appear septic here in the emergency department on a clinical basis.  Driveline site was swabbed for culture.    I have reviewed the nursing notes. I have reviewed the findings, diagnosis, plan and need for follow up with the patient.    Current Discharge Medication List          Final diagnoses:   Hypervolemia, unspecified hypervolemia type       --  Steve Zimmer  [facility] EMERGENCY DEPARTMENT  12/8/2022     Steve Zimmer MD  12/09/22 0855

## 2022-12-08 NOTE — H&P
[facility]    Cardiology History and Physical - Cardiology         Date of Admission:  12/8/2022    Assessment & Plan: S      Eliseo Tanner is a 67-year-old man with systolic heart failure 2/2 NICM s/p destination HM-III LVAD (2/18/2020) c/b recurrent drive-line infections, moderate CAD, ABHINAV on CPAP, T2DM, HTN, CKD-III, ANA, who is admitted for heart failure exacerbation and recurrent drive-line infection.    Acute-on-chronic systolic congestive heart failure and RV dysfunction d/t NICM s/p destination HM III   Stage D, NYHA Class II-III.  - Volume status: appears hypervolemic, with weight 190 lbs from 178 dry weight at last discharge 09/2022.   > bumetanide 2 mg   > followed by bumetanide 0.5 mg/hr  - HFrEF GDMT & Regimen:   ACEi/ARB/ARNi: none at this time. For afterload reduction:   > continue pta hydralazine 100 mg TID   > continue pta amlodipine 10 mg daily   > continue pta isosorbide mononitrate 90 mg  BB: deferred iso RHF, bradycardia  MRA: prioritizing other GDMT agents  SGLT-2i: pta dapagliflozin 5 mg  SCD prophylaxis: single-lead ICD  - ASA 81 mg  - Anticoagulation with warfarin, goal 1.8-2.5 per recent clinic notes. Pharmacy to dose  - VAD interrogation at bedside without concerning alarms  - NT-proBNP add-on  - TTE in AM  - ICD interrogation pending  - Remote PA pressure monitor (CARDIOMems) interrogation pending      Concern for driveline infection  Pus observed from driveline on 12/07. Some tachypnea on presentation, but no fever, tachycardia, leukocytosis. PTA on suppressive TMP-SMX since 11/08/2022 and before that on cephalosporins for tetracycline-resistant MSSA. Preop hx of enterococcus and proteus bacteremia.  - start pip-tazo   > will likely transition to nafcillin in AM pending preliminary investigation  - start vancomycin   > plan to discontinue if MRSA nares test is negative  - 1x dose micafungin  - CT C/A/P w/o contrast to assess for  nidus of infection  - f/u blood culture, drive-line site culture      Hepatic injury, likely cholestatic, R factor 1  Found to have elevated , , and Alk Phos 330. Likely predominantly cholestatic given R factor 1. Possibly related to congestion.  - CT C/A/P as above; may need dedicated RUQ      Chronic:  CKD III  Baseline Cr 1.6-1.9 and GFR low 40s. Admission Cr 1.78.  - CTM    Atrial fibrillation  [id number] 4(+CHF, + age, +T2DM, + HTN). History of afib w/ RVR and aberrancy vs. correction vs. AT vs. Slow VT. S/p DCCV on 2/28 back into sinus rhythm.  - pta amiodarone 200 mg       CAD  Mild to moderate CAD with severe branch disease per [facility] 11/2019.   - cont ASA, statin      Hypothyroidism   Last TSH 5.16 on 10/18/22. On amiodarone as above   - cont levothyroxine 50 mcg daily   - recheck TSH     DM II  A1c 6.3 on 09/02/22.  - continue dapagliflozin  - Low-intensity sliding scale     Insomnia   - pta zolpidem     BPH, urinary retention - cont pta finasteride, tamsulosin   GERD - cont PTA PPI  Gout - pta allopurinol discontinued in the past   IgG Kappa monoclonal gammopathy of undetermined significance - Followed by heme/onc outpt       Diet: cardiac diet  DVT Prophylaxis: Warfarin  Guevara Catheter: Not present  Code Status: FULL  Fluids: none  Lines: PIV    Disposition Plan     Expected discharge: 4 - 7 days, recommended to prior living arrangement once fluid volume status optimized on oral medication and infection.       The patient's care was discussed with Dr Deidre Shaver  Internal Medicine, PGY-3  Inpatient Cardiology II Service  p     [facility]  ______________________________________________________________________    Chief Complaint   Volume overload and drive-line infection    History is obtained from the patient and chart review    History of Present Illness   Eliseo Tanner is a 67-year-old man with systolic heart failure 2/2 NICM s/p  destination HM-III LVAD (2/18/2020) c/b recurrent drive-line infections, moderate CAD, ABHINAV on CPAP, T2DM, HTN, CKD-III, ANA, who is admitted for heart failure exacerbation and recurrent drive-line infection.    Patient is admitted from clinic, where he had been seen for a pre-planned LVAD management follow-up visit.  Purulent drainage was noted at the driveline site and patient was advised to present to the [facility] ED.    Patient was admitted in September 2022 for heart failure exacerbation and was diuresed to a new dry weight of 178 pounds from 218. He had been well since discharge, though he has noted an increase in his weight since Thanksgiving. He also reports increased abdominal girth and lower extremity edema. Otherwise, patient does not believe he has been worse.  He denies chest pain, shortness of breath, palpitations, and LVAD alarms.    In the ED, K 3.9, Cr 1.78, , , Alk Phos 330. Blood and driveline Cx were sent, and patient was admitted to Cards 2.    Review of Systems    The 10 point Review of Systems is negative other than noted in the HPI.    Past Medical History    I have reviewed this patient's medical history and updated it with pertinent information if needed.   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  Past Surgical History:   Procedure Laterality Date     ANESTHESIA CARDIOVERSION N/A 2/28/2020    Procedure: ANESTHESIA, FOR CARDIOVERSION;  Surgeon: GENERIC ANESTHESIA PROVIDER;  Location: [facility] OR     CV CARDIOMEMS WITH RIGHT HEART CATH N/A 9/20/2022    Procedure: Pulmonary Arterial Pressure Sensor  Placement CPT Codes to be cleared by financial securing for this implant. 69923 and ;  Surgeon: Dalton Baeza MD;  Location:  HEART CARDIAC CATH LAB     CV CENTRAL VENOUS CATHETER PLACEMENT N/A 2/13/2020    Procedure: Central Venous Catheter Placement;  Surgeon: Chente Moss MD;  Location:  HEART CARDIAC CATH LAB     CV INTRA AORTIC BALLOON N/A 2/7/2020    Procedure: Intra-Aortic Balloon Pump Insertion;  Surgeon: Jose Baldwin MD;  Location:  HEART CARDIAC CATH LAB     CV INTRA AORTIC BALLOON N/A 2/13/2020    Procedure: Intra-Aortic Balloon Pump Insertion;  Surgeon: Chente Moss MD;  Location:  HEART CARDIAC CATH LAB     CV RIGHT HEART CATH MEASUREMENTS RECORDED N/A 9/21/2020    Procedure: CV RIGHT HEART CATH;  Surgeon: Dalton Baeza MD;  Location:  HEART CARDIAC CATH LAB     CV RIGHT HEART CATH MEASUREMENTS RECORDED N/A 1/7/2021    Procedure: Right Heart Cath;  Surgeon: Chun Ball MD;  Location:  HEART CARDIAC CATH LAB     CV RIGHT HEART CATH MEASUREMENTS RECORDED N/A 2/10/2022    Procedure: CV RIGHT HEART CATH;  Surgeon: Dalton Baeza MD;  Location:  HEART CARDIAC CATH LAB     CV RIGHT HEART CATH MEASUREMENTS RECORDED N/A 9/20/2022    Procedure: Right Heart Catheterization [[id number]];  Surgeon: Dalton Baeza MD;  Location:  HEART CARDIAC CATH LAB     CV SWAN LUCIANA PROCEDURE N/A 2/13/2020    Procedure: [place] Luciana Procedure;  Surgeon: Chente Moss MD;  Location:  HEART CARDIAC CATH LAB     EP ICD Bilateral 3/16/2020    Procedure: EP ICD;  Surgeon: Dali Day MD;  Location:  HEART CARDIAC CATH LAB     INSERT VENTRICULAR ASSIST DEVICE LEFT (HEARTMATE II) N/A 2/18/2020    Procedure: INSERTION, LEFT VENTRICULAR ASSIST DEVICE (HEARTMATE III);  Surgeon: Mac Jaramillo MD;  Location: [facility] OR     IR CVC TUNNEL PLACEMENT > 5 YRS OF AGE  2/23/2021     IR CVC TUNNEL REMOVAL RIGHT  3/16/2021      THORACOSCOPY Right 3/6/2020    Procedure: RIGHT VIDEO-ASSISTED THORASCOPIC SURGERY, EVACUATION OF HEMOTHORAX, PLACEMENT OF CHEST TUBES;  Surgeon: William Gan MD;  Location:  OR       Social History   I have reviewed this patient's social history and updated it with pertinent information if needed.  Social History     Tobacco Use     Smoking status: Former     Types: Cigarettes     Quit date: 1994     Years since quittin.7     Smokeless tobacco: Never   Substance Use Topics     Alcohol use: Not Currently     Drug use: Never     Family History   I have reviewed this patient's family history and updated it with pertinent information if needed.       Prior to Admission Medications   Prior to Admission Medications   Prescriptions Last Dose Informant Patient Reported? Taking?   B Complex-C-Folic Acid (RENAL) 1 MG CAPS  Self No No   Sig: Take 1 capsule by mouth daily   FLUoxetine (PROZAC) 10 MG capsule  Self Yes No   Sig: Take 30 mg by mouth daily   allopurinol (ZYLOPRIM) 100 MG tablet  Self No No   Si tablets (200 mg) by Oral or Feeding Tube route daily   amLODIPine (NORVASC) 5 MG tablet  Self No No   Sig: Take 2 tablets (10 mg) by mouth daily   amiodarone (PACERONE) 200 MG tablet  Self No No   Sig: TAKE 1 TABLET BY MOUTH ONCE DAILY   aspirin (ASA) 81 MG EC tablet  Self No No   Sig: Take 1 tablet (81 mg) by mouth daily   atorvastatin (LIPITOR) 20 MG tablet  Self No No   Sig: Take 1 tablet (20 mg) by mouth daily   bumetanide (BUMEX) 2 MG tablet   No No   Sig: Take 3 tablets (6 mg) by mouth 2 times daily   cephALEXin (KEFLEX) 500 MG capsule   No No   Sig: Take 1 capsule (500 mg) by mouth 4 times daily   co-enzyme Q-10 200 MG CAPS  Self Yes No   Si mg by Oral or Feeding Tube route daily   dapagliflozin (FARXIGA) 5 MG TABS tablet  Self Yes No   Sig: Take 5 mg by mouth daily   finasteride (PROSCAR) 5 MG tablet  Self No No   Sig: Take 1 tablet (5 mg) by mouth daily Helps with urinary retention.    hydrALAZINE (APRESOLINE) 100 MG tablet  Self No No   Sig: Take 1 tablet (100 mg) by mouth 3 times daily   hydrochlorothiazide (HYDRODIURIL) 25 MG tablet   No No   Sig: Take 75 mg (3 tabs) every Wednesday & Saturday's   insulin glargine (BASAGLAR KWIKPEN) 100 UNIT/ML pen  Self Yes No   Sig: Inject 10 Units Subcutaneous At Bedtime   insulin pen needle (32G X 4 MM) 32G X 4 MM miscellaneous  Self Yes No   isosorbide mononitrate (IMDUR) 30 MG 24 hr tablet  Self No No   Sig: Take 3 tablets (90 mg) by mouth daily   levothyroxine (SYNTHROID/LEVOTHROID) 75 MCG tablet  Self No No   Sig: Take 1 tablet (75 mcg) by mouth every morning (before breakfast)   magnesium oxide (MAG-OX) 400 MG tablet  Self No No   Si tablet (400 mg) by Oral or Feeding Tube route daily   nitroGLYcerin (NITROSTAT) 0.4 MG sublingual tablet  Self No No   Sig: For chest pain place 1 tablet under the tongue every 5 minutes for 3 doses. If symptoms persist 5 minutes after 1st dose call 911.   omeprazole (PRILOSEC) 20 MG DR capsule  Self Yes No   Sig: Take 20 mg by mouth daily   potassium chloride ER (KLOR-CON M) 20 MEQ CR tablet   No No   Sig: Take 80 mEq (4 tabs) in the AM and 80 mEq (4 tabs) in the PM. Take an additional 60 mEq on  and  with HCTZ   sacubitril-valsartan (ENTRESTO) 24-26 MG per tablet  Self No No   Sig: Take 0.5 tablets by mouth 2 times daily   senna-docusate (SENOKOT-S/PERICOLACE) 8.6-50 MG tablet  Self Yes No   Sig: Take 1 tablet by mouth 2 times daily as needed for constipation    sulfamethoxazole-trimethoprim (BACTRIM) 400-80 MG tablet   No No   Sig: Take 1 tablet by mouth daily   tamsulosin (FLOMAX) 0.4 MG capsule  Self Yes No   Sig: Take 2 capsules (0.8 mg) by mouth daily This med helps with urinary retention   warfarin ANTICOAGULANT (COUMADIN) 2 MG tablet  Self Yes No   Sig: Take by mouth daily Take 4 mg PO on Sundays and , and take 2 mg PO on all other days.   warfarin ANTICOAGULANT (COUMADIN) 4 MG  tablet  Self No No   Sig: TAKE 1 TO 2 TABLETS (4 MG TO 8 MG) BY MOUTH ONCE DAILY AS DIRECTED   zolpidem (AMBIEN) 5 MG tablet  Self Yes No   Sig: Take 5 mg by mouth nightly as needed for Insomnia      Facility-Administered Medications: None     Allergies   Allergies   Allergen Reactions     Heparin      HIT screen positive 2/14/20, reflex DAVINA negative; however heme recommended treating as if positive  HIT screen negative 2/11/20     Oxycodone Itching and Other (See Comments)     Chlorhexidine Rash       Physical Exam   Vital Signs: Temp: 98  F (36.7  C) Temp src: Oral BP: 92/80 Pulse: 59   Resp: 15 SpO2: 98 % O2 Device: None (Room air)    Weight: 190 lbs 0 oz    General Appearance: adult male patient in no acute distress  Eyes: PERRL, EOMI  HEENT: Moist, pink mucosae  Respiratory: clear vesicular breath sounds  Cardiovascular: LVAD hum; JV to mandible  GI: protuberant but soft abdomen with no masses  Lymph/Hematologic: no LAD  Genitourinary: deferred  Skin: ecchymoses in UEs  Musculoskeletal: 1-2+ pitting edema R>L (hx of fracture on the R)  Neurologic: No FND  Psychiatric: euthymic    Data   Data reviewed today: I reviewed all medications, new labs and imaging results over the last 24 hours. I personally reviewed Cardiology specific data review: no imaging or EKG's to review for today.     Recent Labs   Lab 12/08/22  1710 12/08/22  1256   WBC 8.6 8.7   HGB 13.3 12.9*   MCV 93 94    248   INR 2.08* 2.19*   NA  --  133*   POTASSIUM  --  3.9   CHLORIDE  --  95*   CO2  --  22   BUN  --  52.1*   CR  --  1.78*   ANIONGAP  --  16*   CALOS  --  8.9   GLC  --  165*   ALBUMIN  --  3.5   PROTTOTAL  --  7.5   BILITOTAL  --  0.6   ALKPHOS  --  330*   ALT  --  122*   AST  --  153*

## 2022-12-08 NOTE — ED TRIAGE NOTES
67 male with heartmate 3 LVAD ambulatory to triage with concern for drive line site infection. Also presenting with increased weight, 10 lb since thanksgiving.      Triage Assessment     Row Name 12/08/22 1153       Triage Assessment (Adult)    Airway WDL WDL       Respiratory WDL    Respiratory WDL WDL       Skin Circulation/Temperature WDL    Skin Circulation/Temperature WDL WDL       Cardiac WDL    Cardiac WDL X  LVAD - HEARTMATE 3       Peripheral/Neurovascular WDL    Peripheral Neurovascular WDL WDL       Cognitive/Neuro/Behavioral WDL    Cognitive/Neuro/Behavioral WDL WDL

## 2022-12-08 NOTE — TELEPHONE ENCOUNTER
Called wife to inform that orders were sent and they got them done. Patient and wife are actually on their way to the [facility] to go to ED as instructed by Dr. Cisneros after consulting with Dr. Bhatti.       Vinnie Olivera RN  Infectious Disease 8:39 AM 12/08/22

## 2022-12-08 NOTE — PHARMACY-VANCOMYCIN DOSING SERVICE
"Pharmacy Vancomycin Initial Note  Date of Service 2022  Patient's  1955  67 year old, male    Indication: driveline infection    Current estimated CrCl = Estimated Creatinine Clearance: 41.5 mL/min (A) (based on SCr of 1.78 mg/dL (H)).    Creatinine for last 3 days  2022: 12:56 PM Creatinine 1.78 mg/dL    Recent Vancomycin Level(s) for last 3 days  No results found for requested labs within last 72 hours.      Vancomycin IV Administrations (past 72 hours)      No vancomycin orders with administrations in past 72 hours.                Nephrotoxins and other renal medications (From now, onward)    Start     Dose/Rate Route Frequency Ordered Stop    22 1800  vancomycin (VANCOCIN) 1,250 mg in 0.9% NaCl 250 mL intermittent infusion         1,250 mg  over 90 Minutes Intravenous EVERY 24 HOURS 22 1749      22 1750  vancomycin (VANCOCIN) 1,500 mg in 0.9% NaCl 250 mL intermittent infusion         1,500 mg  over 90 Minutes Intravenous ONCE 22 1749      22 1745  piperacillin-tazobactam (ZOSYN) 4.5 g vial to attach to  mL bag        Note to Pharmacy: For [facility], [facility] and [facility]: For Zosyn-naive patients, use the \"Zosyn initial dose + extended infusion\" order panel.    4.5 g  over 30 Minutes Intravenous EVERY 6 HOURS 22 1744            Contrast Orders - past 72 hours (72h ago, onward)    None          [employer] Prediction of Planned Initial Vancomycin Regimen  Loading dose: 1500 mg at 17:46 2022.  Regimen: 1250 mg IV every 24 hours.  Start time: 17:48 on 2022  Exposure target: AUC24 (range)400-600 mg/L.hr   AUC24,ss: 562 mg/L.hr  Probability of AUC24 > 400: 84 %  Ctrough,ss: 18 mg/L  Probability of Ctrough,ss > 20: 40 %  Probability of nephrotoxicity (Lodise DARYN ): 14 %        Plan:  1. Start vancomycin  1500 mg IV once then 1250 mg Q24H.   2. Vancomycin monitoring method: AUC  3. Vancomycin therapeutic monitoring goal: 400-600 mg*h/L  4. Pharmacy will " check vancomycin levels as appropriate in 1-3 Days.    5. Serum creatinine levels will be ordered daily for the first week of therapy and at least twice weekly for subsequent weeks.      Hans Cervantes RPH

## 2022-12-09 NOTE — PHARMACY-ANTICOAGULATION SERVICE
Clinical Pharmacy - Warfarin Dosing Consult     Pharmacy has been consulted to manage this patient s warfarin therapy.  Indication: LVAD/RVAD  Therapy Goal: Other - see comments (INR goal 1.8-2.5)  Provider/Team: Jamar Shaver MD  OP Anticoag Clinic: [facility]  Warfarin Prior to Admission: Yes  Warfarin PTA Regimen: 4 mg daily  Significant drug interactions: Amiodarone    INR   Date Value Ref Range Status   12/08/2022 2.08 (H) 0.85 - 1.15 Final   12/08/2022 2.19 (H) 0.85 - 1.15 Final     Chromogenic Factor 10   Date Value Ref Range Status   02/23/2021 31 (L) 70 - 130 % Final     Comment:     Therapeutic Range:  A Chromogenic Factor 10 level of approximately 20-40%   inversely correlates with an INR of 2-3 for patients receiving Warfarin.   Chromogenic Factor 10 levels below 20% indicate an INR greater than 3 and   levels above 40% indicate an INR less than 2.         Recommend warfarin 4 mg today.  Pharmacy will monitor Eliseo Tanner daily and order warfarin doses to achieve specified goal.      Please contact pharmacy as soon as possible if the warfarin needs to be held for a procedure or if the warfarin goals change.      Yaz Billings, Pharm.D., BCPS

## 2022-12-09 NOTE — PROGRESS NOTES
[facility]   Cardiology II Service / Advanced Heart Failure  Daily Progress Note      Patient: Eliseo Tanner  MRN: [id number]  Admission Date: 12/8/2022  Hospital Day # 1    Assessment and Plan: Eliseo Tanner is a 67-year-old man with systolic heart failure 2/2 NICM s/p destination HM-III LVAD (2/18/2020) c/b recurrent drive-line infections, moderate CAD, ABHINAV on CPAP, T2DM, HTN, CKD-III, ANA, who is admitted for heart failure exacerbation and recurrent drive-line infection.    Today's Plan:  - continue bumex gtt @ 0.5 mg/hr   - BID labs while diuresing  - CVTS consult - for consideration of I&D  - ID consult  - Stop Zosyn  - Continue to follow-driveline cultures from the ER as well as from outside hopsital    Acute-on-chronic systolic congestive heart failure and RV dysfunction d/t NICM s/p destination HM III   Stage D, NYHA Class II-III.  - Volume status: appears hypervolemic, last known dry weight was 178 dry weight at last discharge 09/2022. Presented with weight of 190, improved to 184 lbs today.              > continue bumetanide 0.5 mg/hr  - HFrEF GDMT & Regimen:   ACEi/ARB/ARNi: none at this time. For afterload reduction:              > continue pta hydralazine 100 mg TID              > continue pta amlodipine 10 mg daily              > continue pta isosorbide mononitrate 90 mg  BB: deferred iso RHF, bradycardia  MRA: prioritizing other GDMT agents  SGLT-2i: continue pta dapagliflozin 5 mg  SCD prophylaxis: single-lead ICD  - ASA 81 mg  - Anticoagulation with warfarin, goal 1.8-2.5. INR is 2.38 today. 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 Some tachypnea on presentation, but no fever, tachycardia, leukocytosis. PTA on suppressive TMP-SMX since 11/08/2022 and before that on cephalosporins  for tetracycline-resistant MSSA. Preop hx of enterococcus and proteus bacteremia. CT Chest abdomen pelvis with increased fluid along the driveline tract, but no organized abcess.   - Appreciate ID consult  - CVTS consult pending  - Continue to follow driveline cultures from outside hospital (currently MSSA)  - Continue to follow driveline cultures from ER  - Blood cultures NGTD  - Stopped zosyn 12/9 per ID recommendations  - Continue vancomycin (dosed by pharmacy), pending the wound cultures above. May transition if it remains MSSA  - NOTE: Patient does not have insurance coverage for home antibiotics, but historically has been able to get up to q8h infusions at his local hospital.     Hepatic injury, likely cholestatic, R factor 1  Found to have elevated , , and Alk Phos 330. Likely predominantly cholestatic given R factor 1. Possibly related to congestion. CT with chloeithiasis without cholecysitits.   - Downtrending today with diuresis  - Consider RUQ if not improving with diuresis      CKD III  Baseline Cr 1.6-1.9 and GFR low 40s.   - Cr stable at 1.63 (1.78)    Atrial fibrillation  [id number] 4(+CHF, + age, +T2DM, + HTN). History of afib w/ RVR and aberrancy vs. NATHALIA vs. AT vs. Slow VT. S/p DCCV on 2/28 back into sinus rhythm.  - pta amiodarone 200 mg      CAD  Mild to moderate CAD with severe branch disease per [facility] 11/2019.   - cont ASA, statin      Hypothyroidism   Last TSH 5.16 on 10/18/22, stable this admission at 6.11 with FT4 of 1.19. On amiodarone as above   - cont levothyroxine 50 mcg daily , defer dose change fro now     DM II  A1c 6.3 on 09/02/22.  - continue dapagliflozin  - Low-intensity sliding scale     Insomnia   - pta zolpidem     BPH, urinary retention - cont pta finasteride, tamsulosin   GERD - cont PTA PPI  Gout - pta allopurinol discontinued in the past   IgG Kappa monoclonal gammopathy of undetermined significance - Followed by heme/onc outpt        Diet: cardiac diet  DVT  Prophylaxis: Warfarin  Guevara Catheter: Not present  Code Status: FULL  Fluids: none  Lines: PIV    Mervat Decker PA-C  Advanced Heart Failure/Cardiology II Service  Pager [phone] [facility] [phone]    ================================================================    Subjective/24-Hr Events:   Last 24 hr care team notes reviewed. Feeling well today. Feels like the fluid is coming off. Still has some abdominal and LE edema. Breathing is fine. No fevers or chills. Abdominal pain or pain around the driveline. Still noticing increased drainge. No chest pain. No bleeding symptoms. No stroke symptoms.    ROS:  4 point ROS including respiratory, CV, GI and  (other than that noted in the HPI) is negative.     Medications: Reviewed in EPIC.     Physical Exam:   BP (!) 89/78 (BP Location: Left arm)   Pulse 59   Temp 98.1  F (36.7  C) (Oral)   Resp 20   Wt 83.5 kg (184 lb)   SpO2 95%   BMI 28.31 kg/m      GENERAL: Appears comfortable, in no distress .  HEENT: Eye symmetrical, no discharge or icterus bilaterally. Mucous membranes moist and without lesions.  NECK: Supple, JVD >12.   CV: Hum of HM3, no adventitious sounds  RESPIRATORY: Respirations regular, even, and unlabored. Lungs CTA throughout.    GI: Soft and non distended with normoactive bowel sounds present in all quadrants. No tenderness, rebound, guarding.   EXTREMITIES: 1-2+ lower extremity peripheral edema. All extremities are warm and well perfused  NEUROLOGIC: Alert and interacting appropriatly. No focal deficits.   MUSCULOSKELETAL: No joint swelling or tenderness.   SKIN: No jaundice. No rashes or lesions.     Labs:  CMP  Recent Labs   Lab 12/09/22  1144 12/09/22  0826 12/09/22  0502 12/08/22  2154 12/08/22  2152 12/08/22  1710 12/08/22  1256   NA  --   --  140 141  --  136 133*   POTASSIUM  --   --  3.2* 3.2*  --  3.4 3.9   CHLORIDE  --   --  98 98  --  96* 95*   CO2  --   --  26 20*  --  25 22   ANIONGAP  --   --  16* 23*  --  15 16*   * 113*  102* 109*   < > 117* 165*   BUN  --   --  42.5* 51.1*  --  51.2* 52.1*   CR  --   --  1.64* 1.86*  --  1.72* 1.78*   GFRESTIMATED  --   --  45* 39*  --  42* 41*   CALOS  --   --  8.2* 8.7*  --  9.1 8.9   MAG  --   --   --   --   --   --  2.1   PROTTOTAL  --   --  7.1 7.5  --   --  7.5   ALBUMIN  --   --  3.3* 3.5  --   --  3.5   BILITOTAL  --   --  0.7 0.6  --   --  0.6   ALKPHOS  --   --  288* 308*  --   --  330*   AST  --   --  131* 142*  --   --  153*   ALT  --   --  107* 131*  --   --  122*    < > = values in this interval not displayed.       CBC  Recent Labs   Lab 12/09/22  0502 12/08/22  1710 12/08/22  1256   WBC 7.5 8.6 8.7   RBC 4.24* 4.35* 4.26*   HGB 12.8* 13.3 12.9*   HCT 39.2* 40.3 40.0   MCV 93 93 94   MCH 30.2 30.6 30.3   MCHC 32.7 33.0 32.3   RDW 16.6* 16.6* 16.7*    251 248       INR  Recent Labs   Lab 12/09/22  0535 12/08/22  1710 12/08/22  1256   INR 2.38* 2.08* 2.19*       Time/Communication  I personally spent a total of >35 minutes. Of that >20 minutes was counseling/coordination of patient's care. Plan of care discussed with patient. See my note above for details.    Patient discussed with Dr. Tomas.

## 2022-12-09 NOTE — PROGRESS NOTES
Touched base with Dr. Williamson from ID to let them know that we received fax from [facility], on 12/7 had culture of LVAD drive line exit site performed and is growing moderate amount staphylococcus aureus.   Will update as we receive susceptibilities.    Cherry Dejesus RN BSN   VAD Coordinator   Office: [phone]  24/7 On-Call VAD Pager: [phone] opt 4, ask to page VAD coordinator on call

## 2022-12-09 NOTE — PROGRESS NOTES
Transfer  Transferred from: ED  Via: bed  Reason for transfer:Pt appropriate for 6C- improved patient condition  Family: Aware of transfer  Belongings: Sent with pt  Chart: Sent with pt  Medications: Meds from bin sent with pt  Report called from: KRISTEN Whalen

## 2022-12-09 NOTE — CONSULTS
Care Management Initial Consult    General Information  Assessment completed with: Patient,         Primary Care Provider verified and updated as needed: Yes   Readmission within the last 30 days:           Advance Care Planning:            Communication Assessment  Patient's communication style: spoken language (English or Bilingual)    Hearing Difficulty or Deaf: yes   Wear Glasses or Blind: yes    Cognitive  Cognitive/Neuro/Behavioral: WDL                      Living Environment:   People in home: spouse, grandchild(arnold)     Current living Arrangements: house      Able to return to prior arrangements: yes       Family/Social Support:  Care provided by: self, spouse/significant other  Provides care for: grandchild(arnold)  Marital Status:   Wife, Children  Chapis       Description of Support System: Supportive, Involved    Support Assessment: Adequate family and caregiver support, Adequate social supports    Current Resources:   Patient receiving home care services: No     Community Resources: None  Equipment currently used at home: cane, straight  Supplies currently used at home: Wound Care Supplies    Employment/Financial:  Employment Status: retired        Financial Concerns: No concerns identified           Lifestyle & Psychosocial Needs:  Social Determinants of Health     Tobacco Use: Medium Risk     Smoking Tobacco Use: Former     Smokeless Tobacco Use: Never     Passive Exposure: Not on file   Alcohol Use: Not on file   Financial Resource Strain: Not on file   Food Insecurity: Not on file   Transportation Needs: Not on file   Physical Activity: Not on file   Stress: Not on file   Social Connections: Not on file   Intimate Partner Violence: Not on file   Depression: Not at risk     PHQ-2 Score: 0   Housing Stability: Not on file       Functional Status:  Prior to admission patient needed assistance:              Mental Health Status:          Chemical Dependency Status:                 Values/Beliefs:  Spiritual, Cultural Beliefs, Religion Practices, Values that affect care:                 Additional Information:  Pt well known to writer from LVAD program. Pt had his LVAD implanted 2/18/2020. Pt admitted for concerns of acute on chronic drive line infection. Pt had been on IV abx 10/20/22-11/18/2022.     PTA, pt reports he was independent with ADLs and IADLs. Aside from drive line infection, pt states he was doing well.     Discussed discharge planning. Back in 2020 pt did require IV abx (q8 cefazolin) and he recalls that insurance would not cover the home IV abx. At that time pt and wife elected to do outpatient infusions at [facility]. This last round of IV abx was also done at [facility]. Would anticipate that pt and wife will want to do outpatient IV infusion vs going to a TCU. Pt is aware that the only TCU he can go to would be [facility] and at this time unclear if facility is taking LVAD patients.     Writer will continue to follow for discharge planning.   Cintia Perkins, VERÓNICASW

## 2022-12-09 NOTE — PROGRESS NOTES
The patient's HeartMate LVAD was interrogated 12/9/2022  * Speed 5800 rpm   * Pulsatility index 3.1   * Power 4.9 Driver   * Flow 5.1 L/minute   Fluid status: hypervolemic   Alarms were reviewed, and notable for history goes back >2 weeks, very rare pi events.   The driveline exit site was inspected, dressing with yellow drainage starting to show through, dressing intact, no surrounding erythema  All external components were inspected and showed no evidence of damage or malfunction, none replaced.   No changes to VAD settings made

## 2022-12-09 NOTE — CONSULTS
Cardiothoracic Surgery Consult Note      Staff Attestation:  - Patient seen and examined  - Will plan for driveline incision and drainage  - Please call with questions / concerns    Mac Jaramillo  Cardiothoracic surgery  [phone]    Consult Reason: HM 3 driveline infection    HPI: Eliseo Tanner is a 67 year old male with chronic systolic heart failure secondary to NICM now s/p HM 3 on 2/18, moderate CAD, HTN, ABHINAV on CPAP, DM2, CKD Stage III, ANA. His HM3 post-op course was complicated by retrosternal hematoma and hemothorax, RV failure, VT in ICU now on amiodarone and Afib w/AVR S/p DCCV on 2/28. He had pre-op proteus and enterococcus bacteremia and he has had MSSA bacteremia as well, s/p abx; later on had LDH elevation 2/2 to legionella c/b renal failure requiring temporary dialysis (off iHD since 3/10/2021). He has been following with infectious disease Dr. 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 On suppressive TMP-SMX since 11/08/2022 and before that on cephalosporins for tetracycline-resistant MSSA. Patient states that purulence has been present since stopping the cephalosporins mid November. He has not had any fevers/chills or other signs of infection.      A/P:   - CT chest showed surrounding fluid along the tract of the driveline without any organized fluid collection.  - Will have Dr. Jaramillo review CT images and discuss the need/timing for surgical debridement  - Will follow Driveline cultures from OSH  - ID consulted, appreciate their recs  -Thank you for the opportunity to participate in the care of this patient.    Patient and plan discussed with attending.    Gabriela Garrett PA-C   Cardiothoracic Surgery   December 9, 2022 at 12:24 PM    CT Chest 12/8/22:                                                                   IMPRESSION:   1. LVAD drive line with surrounding fluid in the along the tract in  the subcutaneous right anterior chest wall increased from previous.  Trace  fluid surrounding the LVAD outflow tract , similar to prior. No  organized fluid collection or significant associated inflammatory  changes.  2. Left inguinal lymphadenopathy and stable prominent mediastinal  lymph nodes, likely reactive  3. Cholelithiasis without evidence of cholecystitis. Mild intrahepatic  biliary ductal dilation.  4. 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 [id number] and ;  Surgeon: Dalton Baeza MD;  Location: [facility] CARDIAC CATH LAB     CV CENTRAL VENOUS CATHETER PLACEMENT N/A 2/13/2020    Procedure: Central Venous Catheter Placement;  Surgeon: Chente Moss MD;  Location: [facility]     CV INTRA AORTIC BALLOON N/A 2/7/2020    Procedure: Intra-Aortic Balloon Pump Insertion;  Surgeon: Jose Baldwin MD;  Location: [facility]     CV INTRA AORTIC BALLOON N/A 2/13/2020    Procedure: Intra-Aortic Balloon Pump Insertion;  Surgeon: Chente Moss MD;  Location: [facility]     CV RIGHT HEART CATH MEASUREMENTS RECORDED N/A 9/21/2020    Procedure: CV RIGHT HEART CATH;  Surgeon: Dalton Baeza MD;  Location: [facility]     CV RIGHT HEART CATH MEASUREMENTS RECORDED N/A 1/7/2021    Procedure: Right Heart Cath;  Surgeon: Chun Ball  MD;  Location:  HEART CARDIAC CATH LAB     CV RIGHT HEART CATH MEASUREMENTS RECORDED N/A 2/10/2022    Procedure: CV RIGHT HEART CATH;  Surgeon: Dalton Baeza MD;  Location: [facility]     CV RIGHT HEART CATH MEASUREMENTS RECORDED N/A 9/20/2022    Procedure: Right Heart Catheterization [[id number]];  Surgeon: Dalton Baeza MD;  Location:  HEART CARDIAC CATH LAB     CV SWAN LUCIANA PROCEDURE N/A 2/13/2020    Procedure: [place] Procedure;  Surgeon: Chente Moss MD;  Location:  HEART CARDIAC CATH LAB     EP ICD Bilateral 3/16/2020    Procedure: EP ICD;  Surgeon: Dali Day MD;  Location:  HEART CARDIAC CATH LAB     INSERT VENTRICULAR ASSIST DEVICE LEFT (HEARTMATE II) N/A 2/18/2020    Procedure: INSERTION, LEFT VENTRICULAR ASSIST DEVICE (HEARTMATE III);  Surgeon: Mac Jaramillo MD;  Location: [facility]     IR CVC TUNNEL PLACEMENT > 5 YRS OF AGE  2/23/2021     IR CVC TUNNEL REMOVAL RIGHT  3/16/2021     THORACOSCOPY Right 3/6/2020    Procedure: RIGHT VIDEO-ASSISTED THORASCOPIC SURGERY, EVACUATION OF HEMOTHORAX, PLACEMENT OF CHEST TUBES;  Surgeon: William Gan MD;  Location:  OR     Past Surgical History:   Procedure Laterality Date     ANESTHESIA CARDIOVERSION N/A 2/28/2020    Procedure: ANESTHESIA, FOR CARDIOVERSION;  Surgeon: GENERIC ANESTHESIA PROVIDER;  Location: [facility]     CV CARDIOMEMS WITH RIGHT HEART CATH N/A 9/20/2022    Procedure: Pulmonary Arterial Pressure Sensor Placement CPT Codes to be cleared by financial securing for this implant. [id number] and ;  Surgeon: Dalton Baeza MD;  Location:  HEART CARDIAC CATH LAB     CV CENTRAL VENOUS CATHETER PLACEMENT N/A 2/13/2020    Procedure: Central Venous Catheter Placement;  Surgeon: Chente Moss MD;  Location:  HEART CARDIAC CATH LAB     CV INTRA AORTIC BALLOON N/A 2/7/2020    Procedure: Intra-Aortic Balloon Pump Insertion;  Surgeon: Jose Baldwin MD;  Location: [facility] CARDIAC  CATH LAB     CV INTRA AORTIC BALLOON N/A 2020    Procedure: Intra-Aortic Balloon Pump Insertion;  Surgeon: Chente Moss MD;  Location:  HEART CARDIAC CATH LAB     CV RIGHT HEART CATH MEASUREMENTS RECORDED N/A 2020    Procedure: CV RIGHT HEART CATH;  Surgeon: Dalton Baeza MD;  Location:  HEART CARDIAC CATH LAB     CV RIGHT HEART CATH MEASUREMENTS RECORDED N/A 2021    Procedure: Right Heart Cath;  Surgeon: Chun Ball MD;  Location:  HEART CARDIAC CATH LAB     CV RIGHT HEART CATH MEASUREMENTS RECORDED N/A 2/10/2022    Procedure: CV RIGHT HEART CATH;  Surgeon: Dalton Baeza MD;  Location:  HEART CARDIAC CATH LAB     CV RIGHT HEART CATH MEASUREMENTS RECORDED N/A 2022    Procedure: Right Heart Catheterization [[id number]];  Surgeon: Dalton Baeza MD;  Location:  HEART CARDIAC CATH LAB     CV SWAN LUCIANA PROCEDURE N/A 2020    Procedure: [place] Luciana Procedure;  Surgeon: Chente Moss MD;  Location:  HEART CARDIAC CATH LAB     EP ICD Bilateral 3/16/2020    Procedure: EP ICD;  Surgeon: Dali Day MD;  Location:  HEART CARDIAC CATH LAB     INSERT VENTRICULAR ASSIST DEVICE LEFT (HEARTMATE II) N/A 2020    Procedure: INSERTION, LEFT VENTRICULAR ASSIST DEVICE (HEARTMATE III);  Surgeon: Mac Jaramillo MD;  [facility]     IR CVC TUNNEL PLACEMENT > 5 YRS OF AGE  2021     IR CVC TUNNEL REMOVAL RIGHT  3/16/2021     THORACOSCOPY Right 3/6/2020    Procedure: RIGHT VIDEO-ASSISTED THORASCOPIC SURGERY, EVACUATION OF HEMOTHORAX, PLACEMENT OF CHEST TUBES;  Surgeon: William Gan MD;  Location:  OR         FH:  No family history on file.    SH:  Social History     Socioeconomic History     Marital status:    Tobacco Use     Smoking status: Former     Types: Cigarettes     Quit date: 1994     Years since quittin.7     Smokeless tobacco: Never   Substance and Sexual Activity     Alcohol use: Not Currently      Drug use: Never       Home Meds:  Medications Prior to Admission   Medication Sig Dispense Refill Last Dose     allopurinol (ZYLOPRIM) 100 MG tablet 2 tablets (200 mg) by Oral or Feeding Tube route daily 60 tablet 1 12/8/2022 at am     amiodarone (PACERONE) 200 MG tablet TAKE 1 TABLET BY MOUTH ONCE DAILY 90 tablet 3 12/8/2022 at am     amLODIPine (NORVASC) 5 MG tablet Take 2 tablets (10 mg) by mouth daily 180 tablet 3 12/8/2022 at am     aspirin (ASA) 81 MG EC tablet Take 1 tablet (81 mg) by mouth daily 90 tablet 3 12/8/2022 at am     atorvastatin (LIPITOR) 20 MG tablet Take 1 tablet (20 mg) by mouth daily 90 tablet 3 12/8/2022 at am     B Complex-C-Folic Acid (RENAL) 1 MG CAPS Take 1 capsule by mouth daily 30 capsule 0 12/8/2022 at am     bumetanide (BUMEX) 2 MG tablet Take 3 tablets (6 mg) by mouth 2 times daily 540 tablet 3 12/8/2022 at am     co-enzyme Q-10 200 MG CAPS 200 mg by Oral or Feeding Tube route daily   12/7/2022 at unknown     dapagliflozin (FARXIGA) 5 MG TABS tablet Take 5 mg by mouth daily   12/8/2022 at am     finasteride (PROSCAR) 5 MG tablet Take 1 tablet (5 mg) by mouth daily Helps with urinary retention. 30 tablet 0 12/8/2022 at am     FLUoxetine (PROZAC) 10 MG capsule Take 30 mg by mouth daily   12/8/2022 at am     hydrALAZINE (APRESOLINE) 100 MG tablet Take 1 tablet (100 mg) by mouth 3 times daily 270 tablet 3 12/8/2022 at am     isosorbide mononitrate (IMDUR) 30 MG 24 hr tablet Take 3 tablets (90 mg) by mouth daily 270 tablet 3 12/8/2022 at am     levothyroxine (SYNTHROID/LEVOTHROID) 75 MCG tablet Take 1 tablet (75 mcg) by mouth every morning (before breakfast) 30 tablet 1 12/8/2022 at am     magnesium oxide (MAG-OX) 400 MG tablet 1 tablet (400 mg) by Oral or Feeding Tube route daily 30 tablet 1 12/8/2022 at am     omeprazole (PRILOSEC) 20 MG DR capsule Take 20 mg by mouth daily   12/8/2022 at am     potassium chloride ER (KLOR-CON M) 20 MEQ CR tablet Take 80 mEq (4 tabs) in the AM and  80 mEq (4 tabs) in the PM. Take an additional 60 mEq on Wednesdays and Saturdays with HCTZ 720 tablet 3 12/8/2022 at am     senna-docusate (SENOKOT-S/PERICOLACE) 8.6-50 MG tablet Take 1 tablet by mouth 2 times daily as needed for constipation    Unknown at as needed     tamsulosin (FLOMAX) 0.4 MG capsule Take 2 capsules (0.8 mg) by mouth daily This med helps with urinary retention 30 capsule 0 12/7/2022 at pm     warfarin ANTICOAGULANT (COUMADIN) 4 MG tablet TAKE 1 TO 2 TABLETS (4 MG TO 8 MG) BY MOUTH ONCE DAILY AS DIRECTED (Patient taking differently: Take 4 mg by mouth daily Take one tablet  4 mg by mouth daily) 90 tablet 3 12/7/2022 at 6:00 pm     zolpidem (AMBIEN) 5 MG tablet Take 5 mg by mouth nightly as needed for Insomnia   12/7/2022 at night     cephALEXin (KEFLEX) 500 MG capsule Take 1 capsule (500 mg) by mouth 4 times daily (Patient not taking: Reported on 12/8/2022) 120 capsule 0 Not Taking     hydrochlorothiazide (HYDRODIURIL) 25 MG tablet Take 75 mg (3 tabs) every Wednesday & Saturday's 90 tablet 3      insulin glargine (BASAGLAR KWIKPEN) 100 UNIT/ML pen Inject 10 Units Subcutaneous At Bedtime (Patient not taking: Reported on 12/8/2022)   Not Taking     insulin pen needle (32G X 4 MM) 32G X 4 MM miscellaneous  (Patient not taking: Reported on 12/8/2022)   Not Taking     nitroGLYcerin (NITROSTAT) 0.4 MG sublingual tablet For chest pain place 1 tablet under the tongue every 5 minutes for 3 doses. If symptoms persist 5 minutes after 1st dose call 911. (Patient not taking: Reported on 12/8/2022) 30 tablet 0 Not Taking     sacubitril-valsartan (ENTRESTO) 24-26 MG per tablet Take 0.5 tablets by mouth 2 times daily (Patient not taking: Reported on 12/8/2022) 60 tablet 1 Not Taking     sulfamethoxazole-trimethoprim (BACTRIM) 400-80 MG tablet Take 1 tablet by mouth daily (Patient not taking: Reported on 12/8/2022) 30 tablet 3 Not Taking     warfarin ANTICOAGULANT (COUMADIN) 2 MG tablet Take by mouth daily Take 4  mg PO on Sundays and Wednesdays, and take 2 mg PO on all other days. (Patient not taking: Reported on 12/8/2022)   Not Taking       Allergies:  Allergies   Allergen Reactions     Heparin      HIT screen positive 2/14/20, reflex DAVINA negative; however heme recommended treating as if positive  HIT screen negative 2/11/20     Oxycodone Itching and Other (See Comments)     Chlorhexidine Rash       ROS: negative unless noted in HPI    Physical Exam:  Temp:  [98  F (36.7  C)-98.4  F (36.9  C)] 98.1  F (36.7  C)  Pulse:  [59-65] 59  Resp:  [15-23] 20  BP: ()/(78-88) 89/78  Cuff Mean (mmHg):  [89] 89  SpO2:  [94 %-100 %] 95 %  Gen: NAD, resting comfortably in bed, conversational  CV: paced rhythm. BLE edema  Neuro: AOx3, CN II-VII grossly intact  Mental: normal mood and affect, regular rate of speech    Labs:  ABG No lab results found in last 7 days.  CBC  Recent Labs   Lab 12/09/22  0502 12/08/22  1710 12/08/22  1256   WBC 7.5 8.6 8.7   HGB 12.8* 13.3 12.9*    251 248     BMP  Recent Labs   Lab 12/09/22  1144 12/09/22  0826 12/09/22  0502 12/08/22 2154 12/08/22 2152 12/08/22 1710 12/08/22  1256   NA  --   --  140 141  --  136 133*   POTASSIUM  --   --  3.2* 3.2*  --  3.4 3.9   CHLORIDE  --   --  98 98  --  96* 95*   CO2  --   --  26 20*  --  25 22   BUN  --   --  42.5* 51.1*  --  51.2* 52.1*   CR  --   --  1.64* 1.86*  --  1.72* 1.78*   * 113* 102* 109*   < > 117* 165*    < > = values in this interval not displayed.     LFT  Recent Labs   Lab 12/09/22  0535 12/09/22  0502 12/08/22  2154 12/08/22  1710 12/08/22  1256   AST  --  131* 142*  --  153*   ALT  --  107* 131*  --  122*   ALKPHOS  --  288* 308*  --  330*   BILITOTAL  --  0.7 0.6  --  0.6   ALBUMIN  --  3.3* 3.5  --  3.5   INR 2.38*  --   --  2.08* 2.19*     PancreasNo lab results found in last 7 days.

## 2022-12-09 NOTE — PROGRESS NOTES
D: Admitted 12/8 for heart failure exacerbation and recurrent drive-line infection.  PMH systolic heart failure 2/2 NICM s/p destination HM-III LVAD (2/18/2020) c/b recurrent drive-line infections, moderate CAD, ABHINAV on CPAP, T2DM, HTN, CKD-III, ANA.    I: Monitored vitals and assessed pt status.   Running: Bumex @0.5 mg/hr   PRN: Ambien   Tele: Sinus rhythm, HR 60's  Mobility: Up ind     A: AOx4. VSS. Afebrile. Urinating adequately, on bumex gtt. LBM today. Denies any pain.      P: Continue to monitor Pt status and report changes to Cards 2.

## 2022-12-09 NOTE — PLAN OF CARE
Temp: 98.4  F (36.9  C) Temp src: Oral BP: 102/87 Pulse: 59   Resp: 16 SpO2: 97 % O2 Device: BiPAP/CPAP       Shift: [id number]  D: Admitted 12/8 for HF exacerbation & recurrent driveline infection.      Neuro: A&O x4. Denies numbness/tingling.  Cardiac: Tele in place, V-paced, HR 60's. Afebrile. LVAD HM3 no alarms during shift, numbers WNL.   Resp: VSS on RA sating > 97%. Wears CPAP while sleeping.  GI/: Adequate urine output. No BM during shift.  Skin: No new deficits noted  LDA's: 2 R PIV in place infusing Bumex gtt @ 0.5 mg/hr.   Pain: Denies    Plan: Continue to monitor and follow POC. Notify Cards 2 with any changes.

## 2022-12-09 NOTE — CONSULTS
[facility] CONSULT NOTE     Patient:  Eliseo Tanner   Date of birth 1955, Medical record number [id number]  Date of Visit:  12/09/2022  Date of Admission: 12/8/2022  Consult Requester:Jan Jiang MD          Assessment and Recommendations:   ID Problem List  1. 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 History of MSSA bacteremia 11/2020 secondary to MSSA driveline infection  3. History of pre-op Proteus and Enterococcus bacteremia  4. History of severe Legionella pneumonia (serogroup 1L) c/b cardiogenic shock, renal failure requiring CRRT, and resp failure requiring intubation, s/p azithromycin   5. CKD    RECOMMENDATION:  1. Discontinue Zosyn  2. Continue IV vancomycin  3. Follow up OSH wound Cx 12/7 and pending cultures at [facility]  a. If methicillin-sensitive Staph aureus on OSH Cx, can stop vancomycin, start cefazolin 2g q8 hrs (to be renally adjusted)  4. Discuss with CVTS regarding CT findings - ?any role for debridement or line reposition    ASSESSMENT:  Eliseo Tanner is a 67 year old male with past medical history significant for CHF from [facility] s/p HM3 and ICD placement (2/18/20), DM, CKD, chronic MSSA driveline infection on PO suppression, history of MSSA bacteremia (11/2020) 2/2 driveline infection, and pre-operative bacteremia (proteus, enterococcus) who was admitted 12/8/22 for increased driveline drainage and heart failure exacerbation. He recently completed course of IV cefazolin 10/21-11/18/22 for increased driveline drainage with MSSA (tetracycline R) on cultures. This improved but did not fully resolve drainage per patient. He was transitioned to PO Bactrim for suppression on 11/19, renally adjusted to 1 single strength daily, but had increased purulent drainage noted 1 week later. Previous suppressive regimens of cephalexin and cefadroxil with similar breakthrough drainage despite in vitro susceptibilities. He presents  afebrile, mildly elevated ESR/CRP, no leukocytosis, and OSH Cx with Staph aureus (sensitivities pending) and gram stain here with [facility]. He was also found with heart failure exacerbation with increased edema and weight gain, though denies dyspnea. Labs also notable for elevated liver enzymes (since Sept this year). US abdomen with normal liver, no mass, no ascites or pleural effusions, normal bile ducts. CT C/A/P with LVAD driveline showing surrounding fluid along tract increased from prior, no organized fluid collection or significant inflammatory changes and trace ascites. He was started empirically on micafungin x1, vancomycin, and zosyn. Recommend stop micafungin and zosyn, continue vancomycin pending sensitivities. If MSSA, can change to cefazolin. Given CT findings (fluid collection though no inflammatory changes) and increasing frequency of driveline infections (at least 4+ cultures now this year alone), recommend discuss with CVTS for any possible interventions.     Thank you for this consult. Infectious diseases will follow peripherally over the weekend. Dr. Lucas (pg [phone] ) will be covering the General ID services over the weekend. Dr. Juarez (p) will take over the Yellow ID service on Monday.     Patient was discussed with Dr. Martino. Recommendations discussed with primary team.    Shobha Panchal PA-C  Infectious Diseases  Pager # [phone]    I spent a total of 60 minutes face-to-face and/or coordinating care of Eliseo Tanner. Over 50% of my time on the unit was spent counseling the patient and/or coordinating care.         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     He was seen routinely in LVAD clinic on 12/7 with reports of 10 lb weight gain since Thanksgiving, with increased abdominal distention and lower extremity edema. He denied chest pain or dyspnea. Increased purulent drainage was noted from driveline by provider, patient and wife. It was recommended to present to ED for admission.     His previous MSSA isolate is tetracycline resistant. He has had at least 3 positive wound cultures for MSSA this year. One positive culture in 02/2022 for C. Acnes. He was previously on either cephalexin or cefadroxil for suppression but continued to have increased drainage with these regimens. Cephalexin seemed slightly better in the past per patient, but when switched back it did not improve his drainage in October 2022. He completed IV cefazolin from 10/21-11/18, then started on PO Bactrim for suppression per last ID note (Dr. Bahtti, 11/8/22). PO Bactrim was ordered as 1 SS daily due to renal function and with close monitoring of his INR (on warfarin). He has been on PO Bactrim for 2 weeks.    He denies fever, chills, rigors, nausea, vomiting, abdomina pain, diarrhea, skin rash, joint pain/swelling, or urinary symptoms. He reports improvement in drainage while on IV cefazolin but that this did not totally resolve the drainage. It was no longer purulent and \"goopy\". He started Bactrim on 11/19 and noticed increased purulent drainage within 1 week. This was purulent, yellow/brown in color. OSH culture at [facility] on 12/7 with Staph aureus (pending sensitivities), swab culture here with [facility] on gram stain. Labs notable for elevated liver enzymes with , , and alk phos 308, and normal bilirubin. He remains afebrile, no leukocytosis. BNP 6219. ESR 49 and CRP 31.5. MRSA nares negative.  US abdomen with normal liver, no mass, no ascites or pleural effusions, normal bile ducts. CT C/A/P with LVAD driveline showing surrounding " fluid along tract increased from prior, no organized fluid collection or significant inflammatory changes and trace ascites.     He lives with his wife. He has 2 dogs and a cat. 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          Review of Systems:   CONSTITUTIONAL:  No fevers, chills or night sweats.   EYES: negative for icterus, redness, or purulent drainage.   ENT:  negative for nasal congestion, rhinorrhea, or sore throat.  RESPIRATORY:  negative for cough with sputum and dyspnea.  CARDIOVASCULAR:  negative for chest pain or palpitations.  GASTROINTESTINAL:  negative for nausea, vomiting, diarrhea and constipation.  GENITOURINARY:  negative for dysuria, frequency, or urgency.   HEME:  No easy bruising or bleeding.  INTEGUMENT:  negative for rash and pruritus.  NEURO:  Negative for headache, vision changes, or numbness/tingling in extremities.         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)      LVAD (left ventricular assist device) present (H)             Past Surgical History:     Past Surgical History:   Procedure Laterality Date     ANESTHESIA CARDIOVERSION N/A 2/28/2020    Procedure: ANESTHESIA, FOR CARDIOVERSION;  Surgeon: GENERIC ANESTHESIA PROVIDER;  Location: [facility] OR     CV CARDIOMEMS WITH RIGHT HEART CATH N/A 9/20/2022    Procedure: Pulmonary Arterial Pressure Sensor Placement CPT Codes to be cleared by financial securing for this implant. [id number] and ;  Surgeon: Dalton Baeza MD;  [facility]     CV CENTRAL VENOUS CATHETER PLACEMENT N/A 2/13/2020    Procedure: Central Venous Catheter Placement;  Surgeon: Chente Moss MD;  [facility]     CV INTRA AORTIC BALLOON N/A 2/7/2020     Procedure: Intra-Aortic Balloon Pump Insertion;  Surgeon: Jose Baldwin MD;  Location:  HEART CARDIAC CATH LAB     CV INTRA AORTIC BALLOON N/A 2020    Procedure: Intra-Aortic Balloon Pump Insertion;  Surgeon: Chente Moss MD;  Location:  HEART CARDIAC CATH LAB     CV RIGHT HEART CATH MEASUREMENTS RECORDED N/A 2020    Procedure: CV RIGHT HEART CATH;  Surgeon: Dalton Baeza MD;  Location:  HEART CARDIAC CATH LAB     CV RIGHT HEART CATH MEASUREMENTS RECORDED N/A 2021    Procedure: Right Heart Cath;  Surgeon: Chun Ball MD;  Location:  HEART CARDIAC CATH LAB     CV RIGHT HEART CATH MEASUREMENTS RECORDED N/A 2/10/2022    Procedure: CV RIGHT HEART CATH;  Surgeon: Dalton Baeza MD;  Location:  HEART CARDIAC CATH LAB     CV RIGHT HEART CATH MEASUREMENTS RECORDED N/A 2022    Procedure: Right Heart Catheterization [[id number]];  Surgeon: Dalton Baeza MD;  Location:  HEART CARDIAC CATH LAB     CV SWAN LUCIANA PROCEDURE N/A 2020    Procedure: Jackson Luciana Procedure;  Surgeon: Chente Moss MD;  Location:  HEART CARDIAC CATH LAB     EP ICD Bilateral 3/16/2020    Procedure: EP ICD;  Surgeon: Dali Day MD;  Location:  HEART CARDIAC CATH LAB     INSERT VENTRICULAR ASSIST DEVICE LEFT (HEARTMATE II) N/A 2020    Procedure: INSERTION, LEFT VENTRICULAR ASSIST DEVICE (HEARTMATE III);  Surgeon: Mac Jaramillo MD;  Location:  OR     IR CVC TUNNEL PLACEMENT > 5 YRS OF AGE  2021     IR CVC TUNNEL REMOVAL RIGHT  3/16/2021     THORACOSCOPY Right 3/6/2020    Procedure: RIGHT VIDEO-ASSISTED THORASCOPIC SURGERY, EVACUATION OF HEMOTHORAX, PLACEMENT OF CHEST TUBES;  Surgeon: William Gan MD;  Location:  OR            Family History:     No family history on file.         Social History:     Social History     Tobacco Use     Smoking status: Former     Types: Cigarettes     Quit date: 1994     Years since quittin.7      Smokeless tobacco: Never   Substance Use Topics     Alcohol use: Not Currently     History   Sexual Activity     Sexual activity: Not on file            Current Medications:       amiodarone  200 mg Oral Daily     amLODIPine  10 mg Oral Daily     aspirin  81 mg Oral Daily     [Held by provider] atorvastatin  20 mg Oral Daily     dapagliflozin  5 mg Oral Daily     finasteride  5 mg Oral Daily     FLUoxetine  30 mg Oral Daily     hydrALAZINE  100 mg Oral TID     insulin aspart  1-3 Units Subcutaneous TID AC     insulin aspart  1-3 Units Subcutaneous At Bedtime     isosorbide mononitrate  90 mg Oral Daily     levothyroxine  75 mcg Oral QAM AC     magnesium oxide  400 mg Oral or Feeding Tube Daily     multivitamin RENAL  1 tablet Oral Daily     pantoprazole  40 mg Oral QAM AC     piperacillin-tazobactam  4.5 g Intravenous Q6H     potassium chloride ER  80 mEq Oral BID     sodium chloride (PF)  3 mL Intracatheter Q8H     tamsulosin  0.8 mg Oral Daily     vancomycin  1,250 mg Intravenous Q24H     Warfarin Therapy Reminder  1 each Oral See Admin Instructions            Allergies:     Allergies   Allergen Reactions     Heparin      HIT screen positive 2/14/20, reflex DAVINA negative; however heme recommended treating as if positive  HIT screen negative 2/11/20     Oxycodone Itching and Other (See Comments)     Chlorhexidine Rash            Physical Exam:   Vitals were reviewed  Patient Vitals for the past 24 hrs:   BP Temp Temp src Pulse Resp SpO2 Weight   12/09/22 0000 102/87 -- -- 59 16 97 % --   12/08/22 1908 105/82 98.4  F (36.9  C) Oral 65 16 96 % --   12/08/22 1858 -- -- -- -- -- -- 85.8 kg (189 lb 3.2 oz)   12/08/22 1530 92/80 98  F (36.7  C) Oral 59 15 98 % --   12/08/22 1500 -- -- -- 59 23 100 % --   12/08/22 1400 -- -- -- 60 19 97 % --   12/08/22 1300 -- -- -- 59 21 100 % --   12/08/22 1158 104/68 98.2  F (36.8  C) Oral 80 18 99 % 86.2 kg (190 lb)       Physical Examination:  Constitutional: Pleasant adult male  seen sitting up in bed, in NAD. Alert and interactive.   HEENT: NCAT, anicteric sclerae, conjunctiva clear. Moist mucous membranes.   Respiratory: Non-labored breathing, good air exchange. Lungs are clear to auscultation bilaterally, without wheezing, crackles or rhonchi. No cough noted.   Cardiovascular: LVAD hum. LVAD in place, dressing with purulent drainage noted on bandage but not wet all the way through, site not visualized further  GI: Normoactive BS. Abdomen is soft, non-distended, and non-tender to palpation. No rigidity or guarding.  Skin: Warm and dry. No new rashes or lesions on exposed surfaces.  Musculoskeletal: Extremities grossly normal. No tenderness. 2+ edema present.   Neurologic: A &O x3, speech normal, answering questions appropriately. Moves all extremities spontaneously. Grossly non-focal.  Neuropsychiatric: Mentation and affect normal/appropriate.  VAD: PIV is c/d/i with no erythema, drainage, or tenderness.         Laboratory Data:     Inflammatory Markers    Recent Labs   Lab Test 12/08/22  1256 08/17/21  0807 02/16/21  2219 02/15/21  1910 02/11/21  0838 12/17/20  0756 12/14/20  0815 11/05/20  0000   SED 49*  --  72* 47*  --   --   --   --    CRP 31.50* 4.9 270.0* 270.0* 8.6 8.0 6.1 54.9       Hematology Studies    Recent Labs   Lab Test 12/09/22  0502 12/08/22  1710 12/08/22  1256 10/18/22  1250 09/20/22  0925 09/20/22  0924 09/20/22  0656 09/11/22  0727 03/09/21  0510 03/07/21  0543 03/06/21  0430 03/05/21  0400 03/04/21  0405 03/03/21  0455 03/02/21  0401   WBC 7.5 8.6 8.7 6.9  --   --  6.6 6.9   < > 4.5 4.4 5.4 5.6 7.0 7.3   ANEU  --   --   --   --   --   --   --   --   --  2.6 2.8 3.6 3.8 5.3 5.6   AEOS  --   --   --   --   --   --   --   --   --  0.3 0.2 0.2 0.1 0.1 0.1   HGB 12.8* 13.3 12.9* 12.3* 12.7* 12.9* 13.5 14.2   < > 7.9* 8.0* 7.7* 8.2* 8.1* 9.2*   MCV 93 93 94 90  --   --  89 93   < > 82 82 81 81 80 82    251 248 219  --   --  218 221   < > 208 196 210 224 236 218     < > = values in this interval not displayed.       Metabolic Studies     Recent Labs   Lab Test 12/09/22  0502 12/08/22  2154 12/08/22  1710 12/08/22  1256 11/28/22  0848 10/24/22  0857 10/18/22  1250    141 136 133*  --   --  138   POTASSIUM 3.2* 3.2* 3.4 3.9  --   --  4.3   CHLORIDE 98 98 96* 95* 90*   < > 100   CO2 26 20* 25 22  --   --  28   BUN 42.5* 51.1* 51.2* 52.1*  --   --  63.3*   CR 1.64* 1.86* 1.72* 1.78*  --   --  1.96*   GFRESTIMATED 45* 39* 42* 41*  --   --  36*    < > = values in this interval not displayed.       Hepatic Studies    Recent Labs   Lab Test 12/09/22  0502 12/08/22  2154 12/08/22  1256 10/18/22  1250 09/20/22  0656 05/03/22  0943   BILITOTAL 0.7 0.6 0.6 0.5 0.6 0.7   ALKPHOS 288* 308* 330* 175* 213* 151*   ALBUMIN 3.3* 3.5 3.5 4.0 3.8 3.7   * 142* 153* 93* 93* 46*   * 131* 122* 88* 88* 44       Microbiology:  Culture   Date Value Ref Range Status   12/08/2022 No growth after 12 hours  Preliminary   12/08/2022 No growth after 12 hours  Preliminary   10/18/2022 2+ Staphylococcus aureus (A)  Final   10/18/2022 2+ Staphylococcus aureus (A)  Final   09/20/2022 2+ Staphylococcus aureus (A)  Final   09/20/2022 1+ Normal freeman  Final   09/20/2022 No anaerobic organisms isolated  Final   02/10/2022 1+ Staphylococcus aureus (A)  Final   02/10/2022 1+ Cutibacterium (Propionibacterium) acnes (A)  Final     Comment:     Susceptibility testing of Cutibacterium (Propionibacterium) species is not done from this source. This organism is susceptible to penicillin and cefotaxime and most are susceptible to clindamycin.   08/17/2021 No Growth  Final   08/17/2021 No Growth  Final   08/17/2021 1+ Staphylococcus aureus (A)  Final   08/17/2021 No anaerobic organisms isolated  Final       Urine Studies    Recent Labs   Lab Test 02/16/21  0225 11/17/20  0825 02/22/20  0944 02/13/20  0334 02/07/20 2029   LEUKEST Negative Negative Small* Small* Negative   WBCU 0 0 2 81* 3       Vancomycin  Levels    Recent Labs   Lab Test 02/16/20  0400 02/14/20  0331   VANCOMYCIN 18.5 16.2       Hepatitis B Testing   Recent Labs   Lab Test 02/28/21  1155 02/12/20  0440 02/08/20  0408   HBCAB  --  Nonreactive Nonreactive   HEPBANG Nonreactive Nonreactive Nonreactive     Hepatitis C Testing     Hepatitis C Antibody   Date Value Ref Range Status   02/12/2020 Nonreactive NR^Nonreactive Final     Comment:     Assay performance characteristics have not been established for newborns,   infants, and children     02/08/2020 Nonreactive NR^Nonreactive Final     Comment:     Assay performance characteristics have not been established for newborns,   infants, and children       Respiratory Virus Testing    No results found for: RS, FLUAG   COVID negative on admission    IMAGING  CT C/A/P 12/8/22  IMPRESSION:   1. LVAD drive line with surrounding fluid in the along the tract in  the subcutaneous right anterior chest wall increased from previous.  Trace fluid surrounding the LVAD outflow tract , similar to prior. No  organized fluid collection or significant associated inflammatory  changes.  2. Left inguinal lymphadenopathy and stable prominent mediastinal  lymph nodes, likely reactive  3. Cholelithiasis without evidence of cholecystitis. Mild intrahepatic  biliary ductal dilation.  4. Trace ascites    US Abd limited 12/8/22  IMPRESSION:   1.  Normal grayscale and Doppler evaluation of the liver, as well as  portal and hepatic vasculature.  2.  Decompressed gallbladder without evidence of acute cholecystitis.  No evidence of cholestatic disease process. There is a 3 mm  gallbladder cholesterol polyp which does not require follow-up.

## 2022-12-10 NOTE — PROCEDURES
The patient's HeartMate LVAD was interrogated 12/10/2022  * Speed 5800 rpm   * Pulsatility index 3-3.2  * Power 4.8-5 Driver   * Flow 5.1-5.3 L/minute   Fluid status: hypervolemic   Alarms were reviewed, with no LVAD alarms or signs of pump malfunction.   The driveline exit site was covered, with dressing c/d/i.   All external components were inspected and showed no evidence of damage or malfunction, none replaced.   No changes to VAD settings made.      Aracelis Rose DNP, FNP-BC, CHFN  Advanced Heart Failure Nurse Practitioner  [facility]

## 2022-12-10 NOTE — PROGRESS NOTES
[facility]   Cardiology II Service / Advanced Heart Failure  Daily Progress Note      Patient: Eliseo Tanner  MRN: [id number]  Admission Date: 12/8/2022  Hospital Day # 2    Assessment and Plan:   Mr. Eliseo Tanner is a 67yr old man with a h/o chronic systolic heart failure secondary to NICM (LVEF 15-20% per TTE 9/2022), s/p HM3 LVAD (2/18/2020, DT) c/b recurrent drive-line infections, moderate CAD, ABHINAV (on CPAP), DMII, HTN, CKD-III, and ANA who was admitted for an acute heart failure exacerbation and concerns for recurrent drive-line infection.      PLAN:  - CVTS consult - for consideration of I&D --> planning for tomorrow morning, so will make NPO at midnight  - continue bumex @ 0.5mg/hr, anticipate transitioning to po diuretics tomorrow  - LDH today      Acute-on-chronic biventricular systolic congestive heart failure secondary to NICM (LVEF 15-20% per TTE 9/2022)  s/p HM3 LVAD (2/18/20, DT)  Stage D, NYHA Class II-III.  - ACEi/ARB/ARNi:  deferred  - Afterload reduction:  hydral 100mg TID and imdur 90mg daily  - BB:  Deferred due to RHF and bradycardia  - Aldosterone antagonist:  Deferred while other medications are prioritized  - SGLT2i:  Farxiga 5mg daily  - SCD ppx:  ICD  - Fluid status:  Mildly hypervolemic today.  His last known dry weight was 178# (weight at last discharge 09/2022), he presented with weight of 190#, which has steadily improved with diuresis.  Continue bumetanide 0.5mg/hr, will consider transition to po diuretics tomorrow.  - Antiplatelet:  Aspirin 81mg daily  - Anticoagulation:  Warfarin, dosed per pharmacy with goal INR 1.8-2.5.  INR today 2.25.  - MAPs:  80s  - LDH:  Adding LDH to labs from this am, his trend appears to range 200-300s  - Remote monitoring:  CardioMEMs, has home pillow    HTN  MAPs as above.  - continue hydral, imdur, and amlodipine 10mg daily.    Hypokalemia  K levels have ranged low 3s, in the setting of diuresis.  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  Was on IV cefazolin 10/21-11/18 due to increased driveline drainage with MSSA, which improved but did not completely resolve.  He was transitioned to po bactrim 11/19, but then developed increased purulent drainage 1 week later, prompting this admission.  Would cultures from OSH are growing S. Aureus, would cultures from admission here are also growing S. Aureus.  CT abdomen revealed fluid along driveline tract, increased from prior, but no organized abscess.    - ID consult, appreciate rec's  - CVTS consult, appreciate rec's --> plan for I&D tomorrow am  - stopped andres and zosyn 12/9  - continue vanco, but if cultures show MSSA, will stop and start IV cefazolin 2g IV q8 hours per ID  - NOTE: he does not have insurance coverage for home antibiotics, but historically has been able to get up to q8h infusions at his local hospital.  He would prefer to do this rather than SNF.     Hepatic injury, likely cholestatic, R factor 1  Found to have elevated , , and Alk Phos 330. Likely predominantly cholestatic given R factor 1. Possibly related to congestion. CT with chloeithiasis without cholecysitits.   - PTA atorvastatin has been held, will resume pending LFTs  - LFTs continue to downtrend with diuresis  - Consider RUQ ultrasound if not improving with diuresis      CKD III  Baseline Cr 1.6-1.9 and GFR low 40s.   - Cr remains stable.    Atrial fibrillation  [id number] 4 (+CHF, +age, +T2DM, +HTN). History of afib w/ RVR and aberrancy vs. snf vs. AT vs. Slow VT.  S/p DCCV on 2/28/22, back into sinus rhythm.  - continue amiodarone 200mg daily     CAD  Mild to moderate CAD with severe branch disease per [facility] 11/2019.   - cont ASA, statin as above  - BB deferred as above     Hypothyroidism   Last TSH 5.16 on 10/18/22, stable this admission at 6.11 with FT4 of 1.19. On amiodarone as above   - continue levothyroxine 50mcg  daily     DM II  A1c 6.3 on 09/02/22.  - continue dapagliflozin  - Low-intensity sliding scale     Insomnia - pta zolpidem  BPH, urinary retention - cont pta finasteride, tamsulosin   GERD - cont PTA PPI  Gout - pta allopurinol discontinued in the past   IgG Kappa monoclonal gammopathy of undetermined significance - Followed by heme/onc outpt        Diet: cardiac diet when not NPO  DVT Prophylaxis: Warfarin, ambulate  Guevara Catheter: Not present  Code Status: FULL  Fluids: none  Lines: PIV    ================================================================    Subjective/24-Hr Events:   Mr. Tanner states that he feels well.  He continues to note improvement in his fluid retention, and feels mild bloating today that has improved.  His breathing has been well, and he denies SOB, PND, and orthopnea.  He has been eating, with good appetite and denies nausea, vomiting, diarrhea, and constipation.  He otherwise denies chest pain, palpitations, dizziness, falls, headaches, acute vision changes, fevers, chills, cough, sore throat, and signs of bleeding.      Last 24 hr care team notes reviewed.     ROS:  4 point ROS including respiratory, CV, GI and  (other than that noted in the HPI) is negative.     Medications: Reviewed in EPIC.     Physical Exam:   /79 (BP Location: Left arm)   Pulse 61   Temp 98  F (36.7  C) (Axillary)   Resp 20   Wt 82.5 kg (181 lb 14.4 oz)   SpO2 97%   BMI 27.99 kg/m      GENERAL: Appears comfortable. Sitting upright at edge of bed, NAD.  HEENT: Eye symmetrical, no discharge or icterus bilaterally. Mucous membranes moist and without lesions.  NECK: Supple, JVD at mid neck when sitting upright  CV:  +LVAD hum  RESPIRATORY: Respirations regular, even, and unlabored. Lungs CTA throughout.    GI: Soft and non distended with normoactive bowel sounds present in all quadrants. No tenderness, rebound, guarding.   EXTREMITIES: No LE edema. All extremities are warm and well perfused  NEUROLOGIC:  Alert and oriented. No focal deficits.   MUSCULOSKELETAL: No joint swelling or tenderness.   SKIN: No jaundice. No rashes or lesions. Driveline covered, dressing c/d/i.    Labs:  CMP  Recent Labs   Lab 12/10/22  0616 12/09/22  2122 12/09/22  1836 12/09/22  1611 12/09/22  1336 12/09/22  0826 12/09/22  0502 12/08/22  2154 12/08/22  1710 12/08/22  1256     --   --   --  142  --  140 141   < > 133*   POTASSIUM 3.3*  --  3.5  --  3.6  --  3.2* 3.2*   < > 3.9   CHLORIDE 105  --   --   --  102  --  98 98   < > 95*   CO2 26  --   --   --  27  --  26 20*   < > 22   ANIONGAP 13  --   --   --  13  --  16* 23*   < > 16*   * 108*  --  124* 153*   < > 102* 109*   < > 165*   BUN 32.1*  --   --   --  41.3*  --  42.5* 51.1*   < > 52.1*   CR 1.47*  --   --   --  1.70*  --  1.64* 1.86*   < > 1.78*   GFRESTIMATED 51*  --   --   --  43*  --  45* 39*   < > 41*   CALOS 8.1*  --   --   --  7.8*  --  8.2* 8.7*   < > 8.9   MAG  --   --   --   --   --   --   --   --   --  2.1   PROTTOTAL 6.8  --   --   --  7.0  --  7.1 7.5  --  7.5   ALBUMIN 3.1*  --   --   --  3.3*  --  3.3* 3.5  --  3.5   BILITOTAL 0.6  --   --   --  0.7  --  0.7 0.6  --  0.6   ALKPHOS 268*  --   --   --  299*  --  288* 308*  --  330*   *  --   --   --  151*  --  131* 142*  --  153*   *  --   --   --  119*  --  107* 131*  --  122*    < > = values in this interval not displayed.       CBC  Recent Labs   Lab 12/10/22  0616 12/09/22  0502 12/08/22  1710 12/08/22  1256   WBC 6.4 7.5 8.6 8.7   RBC 4.24* 4.24* 4.35* 4.26*   HGB 12.8* 12.8* 13.3 12.9*   HCT 40.4 39.2* 40.3 40.0   MCV 95 93 93 94   MCH 30.2 30.2 30.6 30.3   MCHC 31.7 32.7 33.0 32.3   RDW 16.9* 16.6* 16.6* 16.7*    251 251 248       INR  Recent Labs   Lab 12/10/22  0616 12/09/22  0535 12/08/22  1710 12/08/22  1256   INR 2.25* 2.38* 2.08* 2.19*       Patient discussed with Dr. Tomas.        Aracelis Rose, DNP, FNP-BC, CHFN  Advanced Heart Failure Nurse Practitioner  [facility]  [place]

## 2022-12-10 NOTE — PLAN OF CARE
D: 67-year-old man with systolic heart failure 2/2 NICM s/p destination HM-III LVAD (2/18/2020) c/b recurrent drive-line infections, moderate CAD, ABHINAV on CPAP, T2DM, HTN, CKD-III, ANA, who is admitted for heart failure exacerbation and recurrent drive-line infection.     I: Monitored vitals and assessed pt status.   Changed: NPO since midnight per orders  LDA: R PIV x2  Running: Bumex gtt @ 2 mL/hr (0.5 mg/hr)   PRN: Ambien x1 at bedtime  Tele: V paced  O2: RA while awake, home CPAP while sleeping  Mobility: SBA/ind     A: A0x4. VSS. Afebrile. LVAD HM3 no alarms during shift, numbers WNL. Denied pain, SOB, MIRANDA, numbness/tingling, dizziness, or nausea. NPO since midnight per orders. ACHS POCT. Urinating adequately via urinal, voids spontaneously without difficulty. No BM this shift. Driveline dressing CDI. No new skin deficits noted this shift. Slight BLE edema. IV vanco given as scheduled this shift. Appeared to sleep well overnight.      P: Continue to monitor Pt status and report changes to Cards 2. Possible I&D for driveline infection today.      Shift [id number]
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
## 2022-12-11 NOTE — ANESTHESIA CARE TRANSFER NOTE
Patient: Eliseo Tanner    Procedure: Procedure(s):  INCISION AND DRAINAGE OF DRIVELINE       Diagnosis: Superficial infection associated with driveline of ventricular assist device (H) [T82.7XXA]  Diagnosis Additional Information: No value filed.    Anesthesia Type:   General     Note:    Oropharynx: oropharynx clear of all foreign objects and spontaneously breathing  Level of Consciousness: awake  Oxygen Supplementation: room air    Independent Airway: airway patency satisfactory and stable  Dentition: dentition unchanged  Vital Signs Stable: post-procedure vital signs reviewed and stable  Report to RN Given: handoff report given  Patient transferred to: PACU    Handoff Report: Identifed the Patient, Identified the Reponsible Provider, Reviewed the pertinent medical history, Discussed the surgical course, Reviewed Intra-OP anesthesia mangement and issues during anesthesia, Set expectations for post-procedure period and Allowed opportunity for questions and acknowledgement of understanding      Vitals:  Vitals Value Taken Time   /90    Temp     Pulse 60 12/11/22 0943   Resp 11 12/11/22 0943   SpO2 97 % 12/11/22 0943   Vitals shown include unvalidated device data.    Electronically Signed By: JUAN Mendenhall CRNA  December 11, 2022  9:45 AM

## 2022-12-11 NOTE — PLAN OF CARE
D: 67-year-old man with systolic heart failure 2/2 NICM s/p destination HM-III LVAD (2/18/2020) c/b recurrent drive-line infections, moderate CAD, ABHINAV on CPAP, T2DM, HTN, CKD-III, ANA, who is admitted for heart failure exacerbation and recurrent drive-line infection.     I: Monitored vitals and assessed pt status.   Changed: NPO since midnight per orders  LDA: R PIV x2  Running: Bumex gtt @ 2 mL/hr (0.5 mg/hr)   PRN: Ambien x1 at bedtime  Tele: V paced  O2: RA while awake, home CPAP while sleeping  Mobility: SBA/ind     A: A0x4. VSS. Afebrile. LVAD HM3 no alarms during shift, numbers WNL. Denied pain, SOB, MIRANDA, numbness/tingling, dizziness, HA, or nausea. NPO since midnight per orders. ACHS POCT (but checked around 0400 as well d/t NPO status). Urinating adequately via urinal, voids spontaneously without difficulty. BM x1 this shift. Driveline dressing CDI. No new skin deficits noted this shift. Trace BLE edema. IV vanco given as scheduled this shift. Appeared to sleep well overnight. Washed down with soap and water.      P: Continue to monitor Pt status and report changes to Cards 2. Plan for I&D for driveline infection today; VAD coordinator paged and aware.     AM meds given before going down to surgery per PACU RN instruction except for aspirin.      Shift [id number]

## 2022-12-11 NOTE — OR NURSING
Device RN notified pt. OTF Tanner. Pt has a pacemaker/defibrillator and is having and I&D of Driveline today. Are you aware of this pt? Thanks. Tyrel. [phone]

## 2022-12-11 NOTE — PROGRESS NOTES
VAD Coordinator in OR throughout duration of Driveline I&D surgery. VAD parameters were monitored in collaboration with anesthesia. Report given to Tyrel PETERSON upon leaving the OR.       VAD parameters at START of surgery:  o Speed: 5800 rpm  o Flow: 5.2 lpm  o Power: 5.1 muhammad  o PI: 3.2    VAD parameters at END of surgery:  o Speed: 5800 rpm  o Flow: 5.4 lpm  o Power: 4.9 muhammad  o PI: 2.3    Speed adjustments made during OR: none    Flow range: 4.8 - 5.4 lpm    PI range: 1.4 - 2.3    Factors noted to cause a significant variation in VAD parameters: PI dropped to 1.4 - 1.6 range with the induction of anesthesia gas. Phenylephirne boluses helped bring the PI up to 1.6-1.8 range. MAPs were intermittently readable.    Other significant events: none    Please page the VAD Coordinator on-call with any VAD related questions (* * * [phone], job code [phone] from an internal line).     Zhao Nielsen RN

## 2022-12-11 NOTE — PROVIDER NOTIFICATION
On call Cardiology Fellow notified pt. OTF Tanner. Pt has ICD/PM (and LVAD). Device RN contacted to turn of ICD/PM for I&D procedure this morning. Device RN not available. Are you available to come turn off ICD/PM? Thanks. Tryel [phone]    Dr. Burnett came to bedside in PACU to turn off ICD/PM prior to procedure. PM set to VOO at 60 BPM for procedure.

## 2022-12-11 NOTE — PROGRESS NOTES
Brief cardiology note:    I was paged by PACU team to turn off Mr Tanner's ICD for is surgery today.    Programmed [employer] [id number] Visia AF MRI VR device:  -changed VF/FVT/VT detection to OFF  -he was being V paced 92% of time, changed settings from VVIR @  bpm  to VOO @ 60 bpm for the OR   -please page cardiology fellow ([phone]) when back from OR to change settings back to original settings     Discussed with [employer] rep Cee Burnett MD  Cardiology Fellow  Pager: [phone]

## 2022-12-11 NOTE — PROGRESS NOTES
[facility]   Cardiology II Service / Advanced Heart Failure  Daily Progress Note      Patient: Eliseo Tanner  MRN: [id number]  Admission Date: 12/8/2022  Hospital Day # 3    Assessment and Plan:   Mr. Eliseo Tanner is a 67yr old man with a h/o chronic systolic heart failure secondary to NICM (LVEF 15-20% per TTE 9/2022), s/p HM3 LVAD (2/18/2020, DT) c/b recurrent drive-line infections, moderate CAD, ABHINAV (on CPAP), DMII, HTN, CKD-III, and ANA who was admitted for an acute heart failure exacerbation and concerns for recurrent drive-line infection.      PLAN:  - I&D per CVTS today  - continue bumex @ 0.5mg/hr, anticipate transition to po if weight down tomorrow  - RHC tomorrow given persistent hypervolemia  - will discuss antibiotic plan with ID --> confirmed plan to stop vanco and start cefazolin 2g IV q8 hours      Acute-on-chronic biventricular systolic congestive heart failure secondary to NICM (LVEF 15-20% per TTE 9/2022)  s/p HM3 LVAD (2/18/20, DT)  Stage D, NYHA Class II-III.  - ACEi/ARB/ARNi:  Deferred -- note that he was not taking entresto as outpatient due to renal dysfunction  - Afterload reduction:  hydral 100mg TID and imdur 90mg daily  - BB:  Deferred due to RHF and bradycardia  - Aldosterone antagonist:  Deferred while other medications are prioritized  - SGLT2i:  Farxiga 5mg daily  - SCD ppx:  ICD  - Fluid status:  Hypervolemic, continue bumex @ 0.5mg/hr and will anticipate transition to po diuretics pending weight tomorrow  - Antiplatelet:  Aspirin 81mg daily  - Anticoagulation:  Warfarin, dosed per pharmacy with goal INR 1.8-2.5.  INR today 2.28.  - MAPs:  70-90s  - LDH:  364 (12/10)  - Remote monitoring:  CardioMEMs, has home pillow    HTN  MAPs as above.  - continue hydral, imdur, and amlodipine 10mg daily.    Hypokalemia  K levels have ranged low 3s, in the setting of diuresis.  - replete K per protocol  - continue to monitor      Recurrent driveline infections, acute on chronic  h/o  recurrent MSSA infections.  Was on IV cefazolin 10/21-11/18 due to increased driveline drainage with +MSSA, which improved but did not completely resolve.  He was transitioned to po bactrim 11/19, but then developed increased purulent drainage 1 week later, prompting this admission.  Would cultures from OSH are growing S. Aureus, would cultures from admission here are also growing S. Aureus.  CT abdomen revealed fluid along driveline tract, increased from prior, but no organized abscess.    - ID consult, appreciate rec's  - CVTS consult, appreciate rec's --> plan for I&D today  - stopped andres and zosyn 12/9  - stop vanco today and start cefazolin 2g IV q8 hours per ID  - NOTE: he does not have insurance coverage for home antibiotics, but historically has been able to get up to q8h infusions at his local hospital.  He would prefer to do this rather than SNF.     Hepatic injury, likely cholestatic, R factor 1  Found to have elevated , , and Alk Phos 330. Likely predominantly cholestatic given R factor 1. Possibly related to congestion. CT with chloeithiasis without cholecysitits.   - PTA atorvastatin has been held, will resume pending LFTs  - LFTs continue to downtrend with diuresis  - Consider RUQ ultrasound if not improving with diuresis      CKD III  Baseline Cr 1.6-1.9 and GFR low 40s.   - Cr remains stable.    Atrial fibrillation  [id number] 4 (+CHF, +age, +T2DM, +HTN). History of afib w/ RVR and aberrancy vs. NATHALIA vs. AT vs. Slow VT.  S/p DCCV on 2/28/22, back into sinus rhythm.  - continue amiodarone 200mg daily     CAD  Mild to moderate CAD with severe branch disease per [facility] 11/2019.   - cont ASA, statin as above  - BB deferred as above     Hypothyroidism   Last TSH 5.16 on 10/18/22, stable this admission at 6.11 with FT4 of 1.19. On amiodarone as above   - continue levothyroxine 50mcg daily     DM II  A1c 6.3 on 09/02/22.  - continue dapagliflozin  - Low-intensity sliding scale     Insomnia -  pta zolpidem  BPH, urinary retention - cont pta finasteride, tamsulosin   GERD - cont PTA PPI  Gout - pta allopurinol discontinued in the past   IgG Kappa monoclonal gammopathy of undetermined significance - Followed by heme/onc outpt        Diet: cardiac diet when not NPO  DVT Prophylaxis: Warfarin, ambulate  Guevara Catheter: Not present  Code Status: FULL  Fluids: none  Lines: PIV    ================================================================    Subjective/24-Hr Events:   Mr. Tanner states that he feels well, with no changes.  His breathing is at baseline, and he denies SOB, PND, and orthopnea.  His appetite is stable, and he denies nausea, vomiting, diarrhea, and constipation.  He does not feel any fluid retention, and is surprised by his weight gain.  He denies chest pain, palpitations, dizziness, falls, headaches, acute vision changes, fevers, chills, cough, sore throat, and signs of bleeding.      Last 24 hr care team notes reviewed.   ROS:  4 point ROS including respiratory, CV, GI and  (other than that noted in the HPI) is negative.     Medications: Reviewed in EPIC.     Physical Exam:   BP 97/83 (BP Location: Left arm)   Pulse 62   Temp 97.9  F (36.6  C)   Resp 16   Wt 83 kg (183 lb)   SpO2 96%   BMI 28.16 kg/m      GENERAL: Appears comfortable. Sitting upright at edge of bed, NAD.  HEENT: Eye symmetrical, no discharge or icterus bilaterally. Mucous membranes moist and without lesions.  NECK: Supple, JVD at mid neck when sitting upright  CV: +LVAD hum  RESPIRATORY: Respirations regular, even, and unlabored. Lungs CTA throughout.    GI: Soft and non distended with normoactive bowel sounds present in all quadrants. No tenderness, rebound, guarding.   EXTREMITIES: No LE edema. All extremities are warm and well perfused  NEUROLOGIC: Alert and oriented. No focal deficits.   MUSCULOSKELETAL: No joint swelling or tenderness.   SKIN: No jaundice. No rashes or lesions. Driveline covered, dressing  c/d/i.    Labs:  [facility]  Recent Labs   Lab 12/11/22  0411 12/10/22  2044 12/10/22  1635 12/10/22  1310 12/10/22  1132 12/10/22  0616 12/09/22  2122 12/09/22  1836 12/09/22  1611 12/09/22  1336 12/09/22  0826 12/09/22  0502 12/08/22  2154 12/08/22  1710 12/08/22  1256   NA  --   --   --  138  --  144  --   --   --  142  --  140 141   < > 133*   POTASSIUM  --   --   --  4.5  4.5  --  3.3*  --  3.5  --  3.6  --  3.2* 3.2*   < > 3.9   CHLORIDE  --   --   --  103  --  105  --   --   --  102  --  98 98   < > 95*   CO2  --   --   --  22  --  26  --   --   --  27  --  26 20*   < > 22   ANIONGAP  --   --   --  13  --  13  --   --   --  13  --  16* 23*   < > 16*   * 180* 103* 292*   < > 101*   < >  --    < > 153*   < > 102* 109*   < > 165*   BUN  --   --   --  36.0*  --  32.1*  --   --   --  41.3*  --  42.5* 51.1*   < > 52.1*   CR  --   --   --  1.61*  --  1.47*  --   --   --  1.70*  --  1.64* 1.86*   < > 1.78*   GFRESTIMATED  --   --   --  46*  --  51*  --   --   --  43*  --  45* 39*   < > 41*   CALOS  --   --   --  8.4*  --  8.1*  --   --   --  7.8*  --  8.2* 8.7*   < > 8.9   MAG  --   --   --  1.9  --   --   --   --   --   --   --   --   --   --  2.1   PROTTOTAL  --   --   --   --   --  6.8  --   --   --  7.0  --  7.1 7.5  --  7.5   ALBUMIN  --   --   --   --   --  3.1*  --   --   --  3.3*  --  3.3* 3.5  --  3.5   BILITOTAL  --   --   --   --   --  0.6  --   --   --  0.7  --  0.7 0.6  --  0.6   ALKPHOS  --   --   --   --   --  268*  --   --   --  299*  --  288* 308*  --  330*   AST  --   --   --   --   --  131*  --   --   --  151*  --  131* 142*  --  153*   ALT  --   --   --   --   --  108*  --   --   --  119*  --  107* 131*  --  122*    < > = values in this interval not displayed.       CBC  Recent Labs   Lab 12/10/22  0616 12/09/22  0502 12/08/22  1710 12/08/22  1256   WBC 6.4 7.5 8.6 8.7   RBC 4.24* 4.24* 4.35* 4.26*   HGB 12.8* 12.8* 13.3 12.9*   HCT 40.4 39.2* 40.3 40.0   MCV 95 93 93 94   MCH 30.2 30.2 30.6 30.3    MCHC 31.7 32.7 33.0 32.3   RDW 16.9* 16.6* 16.6* 16.7*    251 251 248       INR  Recent Labs   Lab 12/10/22  0616 12/09/22  0535 12/08/22  1710 12/08/22  1256   INR 2.25* 2.38* 2.08* 2.19*       Patient discussed with Dr. Tomas.        Aracelis Rose, CORY, FNP-BC, CHFN  Advanced Heart Failure Nurse Practitioner  [facility]

## 2022-12-11 NOTE — ANESTHESIA PREPROCEDURE EVALUATION
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 [id number] and ;  Surgeon: Dalton Baeza MD;  Location: [facility] CARD[facility] CATH [facility]     CV CENTRAL VENOUS CATHETER PLACEMENT N/A 2020    Procedure: Central Venous Catheter Placement;  Surgeon: Chente Moss MD;  Location: [facility]     CV INTRA AORTIC BALLOON N/A 2020    Procedure: Intra-Aortic Balloon Pump Insertion;  Surgeon: Jose Baldwin MD;  [facility]     CV INTRA AORTIC BALLOON N/A 2020    Procedure: Intra-Aortic Balloon Pump Insertion;  Surgeon: Chente Moss MD;  Location: [facility]     CV RIGHT HEART CATH MEASUREMENTS RECORDED N/A 2020    Procedure: CV RIGHT HEART CATH;  Surgeon: Dalton Baeza MD;  Location:  HEART CARDIAC CATH LAB     CV RIGHT HEART CATH MEASUREMENTS RECORDED N/A 2021    Procedure: Right Heart Cath;  Surgeon: Chun Ball MD;  Location:  HEART CARDIAC CATH LAB     CV RIGHT HEART CATH MEASUREMENTS RECORDED N/A 2/10/2022    Procedure: CV RIGHT HEART CATH;  Surgeon: Dalton Baeza MD;  Location:  HEART CARDIAC  CATH LAB     CV RIGHT HEART CATH MEASUREMENTS RECORDED N/A 2022    Procedure: Right Heart Catheterization [[id number]];  Surgeon: Dalton Baeza MD;  Location:  HEART CARDIAC CATH LAB     CV SWAN LUCIANA PROCEDURE N/A 2020    Procedure: Bethune Luciana Procedure;  Surgeon: Chente Moss MD;  Location:  HEART CARDIAC CATH LAB     EP ICD Bilateral 3/16/2020    Procedure: EP ICD;  Surgeon: Dali Day MD;  Location:  HEART CARDIAC CATH LAB     INSERT VENTRICULAR ASSIST DEVICE LEFT (HEARTMATE II) N/A 2020    Procedure: INSERTION, LEFT VENTRICULAR ASSIST DEVICE (HEARTMATE III);  Surgeon: Mac Jaramillo MD;  Location: [facility] OR     IR CVC TUNNEL PLACEMENT > 5 YRS OF AGE  2021     IR CVC TUNNEL REMOVAL RIGHT  3/16/2021     THORACOSCOPY Right 3/6/2020    Procedure: RIGHT VIDEO-ASSISTED THORASCOPIC SURGERY, EVACUATION OF HEMOTHORAX, PLACEMENT OF CHEST TUBES;  Surgeon: William Gan MD;  Location: [facility] OR      Allergies   Allergen Reactions     Heparin      HIT screen positive 20, reflex DAVINA negative; however heme recommended treating as if positive  HIT screen negative 20     Oxycodone Itching and Other (See Comments)     Chlorhexidine Rash      Social History     Tobacco Use     Smoking status: Former     Types: Cigarettes     Quit date: 1994     Years since quittin.7     Smokeless tobacco: Never   Substance Use Topics     Alcohol use: Not Currently      Wt Readings from Last 1 Encounters:   12/10/22 82.5 kg (181 lb 14.4 oz)        Anesthesia Evaluation   Pt has had prior anesthetic.         ROS/MED HX  ENT/Pulmonary:     (+) sleep apnea, uses CPAP,     Neurologic:       Cardiovascular: Comment: NICM s/p LVAD   Recurrent drive line infections    (+) hypertension--CAD ---CHF etiology: NICM  ICD dysrhythmias, a-flutter, valvular problems/murmurs type: MR Previous cardiac testing   Echo: Date: 22 Results:  Interpretation Summary  HM3 LVAD at 5800 rpm.  Left  ventricular function is decreased. The ejection fraction is 10-15%  (severely reduced). Severe diffuse hypokinesis is present. The LVAD inflow  cannula is seen in the apex. It is not approximated to the septum. The  interventricular septum is deviated slightly towards the LV. The inflow  cannula velocities are somewhat low (40 cm/s) but outflow cannula velocities  are normal (120 cm/s).  Moderate right ventricular dilation is present. Global right ventricular  function is moderately reduced.  Mild to moderate mitral and tricuspid insufficiency are present.  The AV appears to open with each beat. There is mild-moderate aortic  regurgitation.  The estimated PA systolic pressure is at least 42 mmHg.  IVC diameter >2.1 cm collapsing <50% with sniff suggests a high RA pressure  estimated at 15 mmHg or greater.  This study was compared with the study from 09/08/2022. No significant changes  noted.  Stress Test: Date: Results:    ECG Reviewed: Date: Results:    Cath: Date: Results:      METS/Exercise Tolerance:     Hematologic:  - neg hematologic  ROS     Musculoskeletal:       GI/Hepatic:     (+) GERD,     Renal/Genitourinary:     (+) renal disease, type: CRI,     Endo:     (+) type II DM,     Psychiatric/Substance Use:       Infectious Disease: Comment: Recurrent drive line infections      Malignancy:       Other:            Physical Exam    Airway        Mallampati: I   TM distance: > 3 FB      Respiratory Devices and Support         Dental  no notable dental history         Cardiovascular          Rhythm and rate: regular and bradycardia     Pulmonary           (+) decreased breath sounds           OUTSIDE LABS:  CBC:   Lab Results   Component Value Date    WBC 6.4 12/10/2022    WBC 7.5 12/09/2022    HGB 12.8 (L) 12/10/2022    HGB 12.8 (L) 12/09/2022    HCT 40.4 12/10/2022    HCT 39.2 (L) 12/09/2022     12/10/2022     12/09/2022     BMP:   Lab Results   Component Value Date     12/10/2022      12/10/2022    POTASSIUM 4.5 12/10/2022    POTASSIUM 4.5 12/10/2022    CHLORIDE 103 12/10/2022    CHLORIDE 105 12/10/2022    CO2 22 12/10/2022    CO2 26 12/10/2022    BUN 36.0 (H) 12/10/2022    BUN 32.1 (H) 12/10/2022    CR 1.61 (H) 12/10/2022    CR 1.47 (H) 12/10/2022     (H) 12/10/2022     (H) 12/10/2022     COAGS:   Lab Results   Component Value Date    PTT 37 03/15/2021    INR 2.25 (H) 12/10/2022    FIBR 247 02/19/2020     POC:   Lab Results   Component Value Date     (H) 03/17/2021     HEPATIC:   Lab Results   Component Value Date    ALBUMIN 3.1 (L) 12/10/2022    PROTTOTAL 6.8 12/10/2022     (H) 12/10/2022     (H) 12/10/2022    ALKPHOS 268 (H) 12/10/2022    BILITOTAL 0.6 12/10/2022     OTHER:   Lab Results   Component Value Date    PH 7.46 (H) 02/22/2021    LACT 0.7 02/24/2021    A1C 6.3 (H) 09/02/2022    CALOS 8.4 (L) 12/10/2022    PHOS 3.7 09/11/2022    MAG 1.9 12/10/2022    TSH 6.11 (H) 12/08/2022    T4 1.19 12/08/2022    CRP 26.30 (H) 12/10/2022    SED 49 (H) 12/08/2022       Anesthesia Plan    ASA Status:  3   NPO Status:  NPO Appropriate    Anesthesia Type: General.     - Airway: LMA         Techniques and Equipment:     - Lines/Monitors: 2nd IV     Consents    Anesthesia Plan(s) and associated risks, benefits, and realistic alternatives discussed. Questions answered and patient/representative(s) expressed understanding.     - Discussed: Risks, Benefits and Alternatives for BOTH SEDATION and the PROCEDURE were discussed     - Discussed with:  Patient      - Extended Intubation/Ventilatory Support Discussed: No.      - Patient is DNR/DNI Status: No    Use of blood products discussed: No .     Postoperative Care    Pain management: Multi-modal analgesia.   PONV prophylaxis: Ondansetron (or other 5HT-3), Dexamethasone or Solumedrol     Comments:                Jose Conner MD

## 2022-12-11 NOTE — ANESTHESIA PROCEDURE NOTES
Airway       Patient location during procedure: OR       Procedure Start/Stop Times: 12/11/2022 8:40 AM  Staff -        Anesthesiologist:  Daniel Marshall MD       CRNA: Kerwin Lynch APRN CRNA       Performed By: CRNA  Consent for Airway        Urgency: elective  Indications and Patient Condition       Indications for airway management: keyla-procedural       Induction type:intravenous       Mask difficulty assessment: 0 - not attempted    Final Airway Details       Final airway type: supraglottic airway    Supraglottic Airway Details        Type: LMA       Brand: I-Gel       LMA size: 5    Post intubation assessment        Placement verified by: capnometry, equal breath sounds and chest rise        Number of attempts at approach: 1       Number of other approaches attempted: 0       Secured with: pink tape       Ease of procedure: easy       Dentition: Intact    Medication(s) Administered   Medication Administration Time: 12/11/2022 8:40 AM

## 2022-12-11 NOTE — OR NURSING
Cardiac Fellow Dr. Burnett at bedside to turn on ICD/PM post procedure in PACU. Settings are VVIR 60/115 with VF Detection ON.

## 2022-12-11 NOTE — BRIEF OP NOTE
[facility]    Brief Operative Note    Pre-operative diagnosis: Superficial infection associated with driveline of ventricular assist device (H) [T82.7XXA]  Post-operative diagnosis Same as pre-operative diagnosis    Procedure: Procedure(s):  INCISION AND DRAINAGE OF DRIVELINE  Surgeon: Surgeon(s) and Role:     * Mac Jaramillo MD - Primary     * Gen Allen MD - Fellow - Assisting  Anesthesia: MAC   Estimated Blood Loss: Less than 50 ml    Drains: None  Specimens:   ID Type Source Tests Collected by Time Destination   A : drive line site Tissue Abdomen AFB CULTURE AND STAIN NON BLOOD, ANAEROBIC BACTERIAL CULTURE ROUTINE, GRAM STAIN, FUNGAL OR YEAST CULTURE ROUTINE, AEROBIC BACTERIAL CULTURE ROUTINE Mac Jaramillo MD 12/11/2022  9:13 AM      Findings:   purulence limited to a few cm proximal to the skin exit site.  Complications: None.  Implants: * No implants in log *    Signed:    Gen Allen MD 12/11/2022 at 9:32 AM  Cardiothoracic Surgery Fellow  Pager: [phone]

## 2022-12-11 NOTE — PROCEDURES
The patient's HeartMate LVAD was interrogated 12/11/2022  * Speed 5800 rpm   * Pulsatility index 2.9-3.2   * Power 4.8-5.1 Driver   * Flow 5.1-5.9 L/minute   Fluid status: hypervolemic   Alarms were reviewed, with no LVAD alarms or signs of pump malfunction.   The driveline exit site was covered, with dressing c/d/i.   All external components were inspected and showed no evidence of damage or malfunction, none replaced.   No changes to VAD settings made.      Aracelis Rose DNP, FNP-BC, CHFN  Advanced Heart Failure Nurse Practitioner  [facility]

## 2022-12-11 NOTE — PHARMACY-VANCOMYCIN DOSING SERVICE
Pharmacy Vancomycin Note  Date of Service 2022  Patient's  1955   67 year old, male    Indication: driveline infection  Day of Therapy: 4  Current vancomycin regimen:  1250 mg IV q24h  Current vancomycin monitoring method: AUC  Current vancomycin therapeutic monitoring goal: 400-600 mg*h/L    InsightRX Prediction of Current Vancomycin Regimen  Regimen: 1250 mg IV every 24 hours.  Start time: 09:21 on 2022  Exposure target: AUC24 (range)400-600 mg/L.hr   AUC24,ss: 530 mg/L.hr  Probability of AUC24 > 400: 91 %  Ctrough,ss: 16.5 mg/L  Probability of Ctrough,ss > 20: 26 %  Probability of nephrotoxicity (Lodise DARYN ): 12 %      Current estimated CrCl = Estimated Creatinine Clearance: 45.1 mL/min (A) (based on SCr of 1.61 mg/dL (H)).    Creatinine for last 3 days  2022: 12:56 PM Creatinine 1.78 mg/dL;  5:10 PM Creatinine 1.72 mg/dL;  9:54 PM Creatinine 1.86 mg/dL  2022:  5:02 AM Creatinine 1.64 mg/dL;  1:36 PM Creatinine 1.70 mg/dL  12/10/2022:  6:16 AM Creatinine 1.47 mg/dL;  1:10 PM Creatinine 1.61 mg/dL    Recent Vancomycin Levels (past 3 days)  12/10/2022:  7:48 PM Vancomycin 11.3 ug/mL    Vancomycin IV Administrations (past 72 hours)                   vancomycin (VANCOCIN) 1,250 mg in 0.9% NaCl 250 mL intermittent infusion (mg) 1,250 mg New Bag 12/10/22 2129     1,250 mg New Bag 22    vancomycin (VANCOCIN) 1,500 mg in 0.9% NaCl 250 mL intermittent infusion (mg) 1,500 mg New Bag 22                Nephrotoxins and other renal medications (From now, onward)    Start     Dose/Rate Route Frequency Ordered Stop    22 0830  norepinephrine (LEVOPHED) 4 mg in  mL PERIPHERAL infusion         0.03-0.125 mcg/kg/min × 83 kg  9.3-38.9 mL/hr  Intravenous CONTINUOUS 22 0818      22 0730  norepinephrine (LEVOPHED) 16 mg in  mL infusion MAX CONC CENTRAL LINE         0.01-0.1 mcg/kg/min × 82.5 kg  0.8-7.7 mL/hr  Intravenous CONTINUOUS 22  0710      12/09/22 2100  [Auto Hold]  vancomycin (VANCOCIN) 1,250 mg in 0.9% NaCl 250 mL intermittent infusion        (Auto Hold since Sun 12/11/2022 at 0717.Hold Reason: Transfer to a procedural area)    1,250 mg  over 90 Minutes Intravenous EVERY 24 HOURS 12/08/22 1749      12/09/22 0800  [Auto Hold]  dapagliflozin (FARXIGA) tablet 5 mg        (Auto Hold since Sun 12/11/2022 at 0717.Hold Reason: Transfer to a procedural area)   Note to Pharmacy: PTA Sig:Take 5 mg by mouth daily      5 mg Oral DAILY 12/08/22 1843      12/08/22 1805  bumetanide (BUMEX) 0.25 mg/mL infusion         0.5 mg/hr  2 mL/hr  Intravenous CONTINUOUS 12/08/22 1804               Contrast Orders - past 72 hours (72h ago, onward)    None          Interpretation of levels and current regimen:  Vancomycin level is reflective of -600    Has serum creatinine changed greater than 50% in last 72 hours: No    Urine output:  unable to determine    Renal Function: Stable    Plan:  1. Continue Current Dose  2. Vancomycin monitoring method: AUC  3. Vancomycin therapeutic monitoring goal: 400-600 mg*h/L  4. Pharmacy will check vancomycin levels as appropriate in 1-3 Days.  5. Serum creatinine levels will be ordered daily for the first week of therapy and at least twice weekly for subsequent weeks.    Ang Vicente [facility]

## 2022-12-11 NOTE — ANESTHESIA POSTPROCEDURE EVALUATION
Patient: Eliseo Tanner    Procedure: Procedure(s):  INCISION AND DRAINAGE OF DRIVELINE       Anesthesia Type:  General    Note:  Disposition: Inpatient   Postop Pain Control: Uneventful            Sign Out: Well controlled pain   PONV: No   Neuro/Psych: Uneventful            Sign Out: Acceptable/Baseline neuro status   Airway/Respiratory: Uneventful            Sign Out: Acceptable/Baseline resp. status   CV/Hemodynamics: Uneventful            Sign Out: Acceptable CV status; No obvious hypovolemia; No obvious fluid overload   Other NRE: NONE   DID A NON-ROUTINE EVENT OCCUR? No           Last vitals:  Vitals Value Taken Time   /90    Temp     Pulse 60    Resp 14    SpO2 98        Electronically Signed By: Daniel Marshall MD  December 11, 2022  9:38 AM

## 2022-12-11 NOTE — PROGRESS NOTES
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  Does not have access to home IV antibiotics, will need to go to local hospital for admistration      ID Recs:  1. Stop vancomycin  2. Start cefazolin 2g IV Q8hr for more optimal MSSA coverage  4. Discuss with CVTS if antibiotic impregnated beads could be placed at site of infection. Gentamicin would be ideal.  4. Start rifabutin 300mg PO QDay.  Discuss with pharmacy on Monday how this may affect medications as rifabutin will induce hepatic metabolism (but induction is not instantaneous and occurs over 1-2 weeks). This induction of p450 cytochromes is less dramatic than rifampin but is certainly present.  ID would go ahead and start that sooner vs. later.    While rifampin is the classic agent for biofilm activity, rifabutin also has been more recently used. It has two advantages:   1) less drug-drug interaction;   2) longer half-life (16-69 hours vs. 3.3 hours)  Citation: Gaby GREGG, Chandra EL, Sandro-Heidy A, Quirino R, Farzana ID. Rifabutin Use in Staphylococcus Biofilm Infections: A Case Series. Antibiotics. 2020; 9(6):326. doi: 10.3390/[id number].       Outpatient therapy  1.  Ceftriaxone 1g IV Q24Hr could be an outpatient option with once daily administration.  Based on skin/soft tissue site of infection, likely 1g would likely be adequate.   2. Adjunctive rifabutin at 300mg PO QDay      [employer]  [id number]

## 2022-12-11 NOTE — OR NURSING
LVAD coordinator, Anila Wang notified regarding PM/ICD.  Anila spoke with Dr Altman regarding plan.  Ágnel Garcia LVAD coordinator now present with pt.

## 2022-12-12 NOTE — OP NOTE
OPERATIVE DATE: 12/11/2022    PRE-OPERATIVE DIAGNOSIS:  1) Driveline infection  Patient Active Problem List   Diagnosis     Acute on chronic systolic congestive heart failure (H)     Anxiety     CKD (chronic kidney disease) stage 3, GFR 30-59 ml/min (H)     Coronary artery disease involving native coronary artery of native heart without angina pectoris     GERD (gastroesophageal reflux disease)     Gout     Hyperlipidemia with target LDL less than 100     Nonischemic cardiomyopathy (H)     Non-nephrotic range proteinuria     ABHINAV on CPAP     Type 2 diabetes mellitus with stage 3 chronic kidney disease, without long-term current use of insulin (H)     Heart failure (H)     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)     Cardiac arrest (H)     LVAD (left ventricular assist device) present (H)     Chronic systolic congestive heart failure (H)     CKD (chronic kidney disease) stage 4, GFR 15-29 ml/min (H)     Bacteremia     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)     Paroxysmal atrial fibrillation (H)     Wound infection     ACC/AHA stage D heart failure (H)     Stage 3b chronic kidney disease (H)     Mixed hyperlipidemia     Benign essential hypertension     Dizziness     Syncope     Tachyarrhythmia     Acute kidney injury (BERNARDA) with acute tubular necrosis (ATN) (H)     Iron deficiency anemia, unspecified iron deficiency anemia type     Hypervolemia, unspecified hypervolemia type     POST-OPERATIVE DIAGNOSIS:  1) Driveline infection  Patient Active Problem List   Diagnosis     Acute on chronic systolic congestive heart failure (H)     Anxiety     CKD (chronic kidney disease) stage 3, GFR 30-59 ml/min (H)     Coronary artery disease involving native coronary artery of native heart without angina pectoris     GERD (gastroesophageal reflux disease)     Gout     Hyperlipidemia with target LDL less than 100     Nonischemic cardiomyopathy (H)     Non-nephrotic range  proteinuria     ABHINAV on CPAP     Type 2 diabetes mellitus with stage 3 chronic kidney disease, without long-term current use of insulin (H)     Heart failure (H)     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)     Cardiac arrest (H)     LVAD (left ventricular assist device) present (H)     Chronic systolic congestive heart failure (H)     CKD (chronic kidney disease) stage 4, GFR 15-29 ml/min (H)     Bacteremia     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)     Paroxysmal atrial fibrillation (H)     Wound infection     ACC/AHA stage D heart failure (H)     Stage 3b chronic kidney disease (H)     Mixed hyperlipidemia     Benign essential hypertension     Dizziness     Syncope     Tachyarrhythmia     Acute kidney injury (BERNARDA) with acute tubular necrosis (ATN) (H)     Iron deficiency anemia, unspecified iron deficiency anemia type     Hypervolemia, unspecified hypervolemia type     PROCEDURE:  1) Incision and drainage of infected driveline    SURGEON: Mac Jaramillo MD    ANESTHESIA: GETA    ESTIMATED BLOOD LOSS: 10cc    OPERATIVE FINDINGS:  1) Purulence at driveline exit site    INDICATIONS:  Mr. WOLFGANG LEACH is a 67 year old male admitted with driveline infection.  We were asked to evaluate for incision and drainage.  Risks and benefits of the operation were explained to the patient and their family including, but not limited to, bleeding, infection, stroke and even death.  They understood these risks and agreed to proceed electively.    OPERATIVE REPORT:  The patient was transferred to the operating room and positioned supine on the OR table.  MAC and local was used for anesthesia.  The patients neck, chest, abdomen and bilateral lower extremities were clipped, prepped and draped in sterile fashion.  A pre-procedure time-out was performed confirming the correct patient, correct site and correct procedure.    An eliptical incision was made around the driveline.   The tract and infected tissue was excited.  Hemostasis was achieved with cautery.  The wound was packed with kerlex.    The patient was then transferred from the operating bed to PACU in stable condition.    All needle, sponge and instrument counts were correct at the end of the case.    Mac Jaramillo  Cardiothoracic Surgery  Pager: [phone]

## 2022-12-12 NOTE — PLAN OF CARE
D: 67-year-old man with systolic heart failure 2/2 NICM s/p destination HM-III LVAD (2/18/2020) c/b recurrent drive-line infections, moderate CAD, ABHINAV on CPAP, T2DM, HTN, CKD-III, ANA, who is admitted for heart failure exacerbation and recurrent drive-line infection.  S/p driveline infection I&D 12/11/22.      I: Monitored vitals and assessed pt status.   Changed: NPO since midnight per orders  LDA: R PIV x2  Running:   -Bumex gtt @ 2 mL/hr (0.5 mg/hr)   -TKO/intermittent abx (cefazolin)  PRN: Ambien x1 at bedtime  Tele: V paced  O2: RA while awake, home CPAP while sleeping  Mobility: SBA/ind     A: A0x4. VSS. Afebrile. LVAD HM3 no alarms during shift, numbers WNL. Denied pain, SOB, MIRANDA, numbness/tingling, dizziness/lightheadedness, HA, or nausea. NPO since midnight per orders. ACHS POCT when eating; Q4H POCT while NPO. Urinating adequately via urinal, voids spontaneously without difficulty. No BM this shift. Driveline/I&D dressing with shadowing/drainage (bleeding) but not saturated as of now. No new skin deficits noted this shift. Trace BLE edema. IV cefazolin given as scheduled this shift. Appeared to sleep well overnight.     P: Continue to monitor Pt status and report changes to Cards 2. Plan for C today 12/12.      Shift [id number]

## 2022-12-12 NOTE — PROGRESS NOTES
CVTS progress note:    POD 1 from [facility] I&D. Dressing taken down bedside, saturated with blood and clots. Patient states started bleeding this morning. 3 venous bleeders near the skin and in the muscle cauterized with silver nitrate. CVTS will continue to do dressing changes bedside. Reinforce dressings overnight if become saturated. OK to change out ABD and re-dress if saturated.      Discussed with Dr. Jaramillo.    Gabriela Garrett PA-C   Cardiothoracic Surgery   December 12, 2022 at 3:36 PM

## 2022-12-12 NOTE — PROGRESS NOTES
[facility]   Cardiology II Service / Advanced Heart Failure  Daily Progress Note      Patient: Eliseo Tanner  MRN: [id number]  Admission Date: 12/8/2022  Hospital Day # 4    Assessment and Plan:   Mr. Eliseo Tanner is a 67yr old man with a h/o chronic systolic heart failure secondary to NICM (LVEF 15-20% per TTE 9/2022), s/p HM3 LVAD (2/18/2020, DT) c/b recurrent drive-line infections, moderate CAD, ABHINAV (on CPAP), DMII, HTN, CKD-III, and ANA who was admitted for an acute heart failure exacerbation and concerns for recurrent drive-line infection.      PLAN:  - RHC today  - diuresis pending RHC  - antibiotics per ID --> continue cefazolin 2g IV q8 hours       ADDENDUM:  RHC showed RA 9, mPA 22, PCW 13.  Will stop lasix gtt and start bumex 5mg po TID.  Note that he was taking bumex 6mg BID and twice weekly hydrodiuril.        Acute-on-chronic biventricular systolic congestive heart failure secondary to NICM (LVEF 15-20% per TTE 9/2022)  s/p HM3 LVAD (2/18/20, DT)  Stage D, NYHA Class II-III.  - ACEi/ARB/ARNi:  Deferred -- note that he was not taking entresto as outpatient due to renal dysfunction  - Afterload reduction:  hydral 100mg TID and imdur 90mg daily  - BB:  Deferred due to RHF and bradycardia  - Aldosterone antagonist:  Deferred while other medications are prioritized  - SGLT2i:  Farxiga 5mg daily  - SCD ppx:  ICD  - Fluid status:  Remains hypervolemic, continue bumex @ 0.5mg/hr and will determine ongoing diuresis after RHC today.  Targeting EDW ~178#.  - Antiplatelet:  Aspirin 81mg daily  - Anticoagulation:  Warfarin, dosed per pharmacy with goal INR 1.8-2.5.  INR today 2.41.  - MAPs:  70-90s  - LDH:  364 (12/10)  - Remote monitoring:  CardioMEMs, has home pillow    HTN  MAPs as above.  - continue hydral, imdur, and amlodipine 10mg daily.  - note that he has not been taking entresto at home as he developed an BERNARDA following initiation    Hypokalemia  K levels have ranged low 3s, in the setting of  diuresis.  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  Was on IV cefazolin 10/21-11/18 due to increased driveline drainage with +MSSA, which improved but did not completely resolve.  He was transitioned to po bactrim 11/19, but then developed increased purulent drainage 1 week later, prompting this admission.  Would cultures from OSH are growing S. Aureus, would cultures from admission here are also growing S. Aureus.  CT abdomen revealed fluid along driveline tract, increased from prior, but no organized abscess.  S/p I&D per CVTS on 12/11.  - ID consult, appreciate rec's  - CVTS consult, appreciate rec's  - stopped andres and zosyn 12/9  - stopped vanco 12/11 and started cefazolin 2g IV q8 hours per ID  - NOTE: he does not have insurance coverage for home antibiotics, but historically has been able to get up to q8h infusions at his local hospital.  He would prefer to do this rather than SNF.     Hepatic injury, likely cholestatic, R factor 1  Found to have elevated , , and Alk Phos 330. Likely predominantly cholestatic given R factor 1. Possibly related to congestion. CT with chloeithiasis without cholecysitits.   - PTA atorvastatin has been held, will resume pending LFTs  - LFTs continue to downtrend with diuresis  - Consider RUQ ultrasound if not improving with diuresis      CKD III  Baseline Cr 1.6-1.9 and GFR low 40s.   - Cr remains stable.    Atrial fibrillation  [id number] 4 (+CHF, +age, +T2DM, +HTN). History of afib w/ RVR and aberrancy vs. snf vs. AT vs. Slow VT.  S/p DCCV on 2/28/22, back into sinus rhythm.  - continue amiodarone 200mg daily     CAD  Mild to moderate CAD with severe branch disease per [facility] 11/2019.   - cont ASA, statin as above  - BB deferred as above     Hypothyroidism   Last TSH 5.16 on 10/18/22, stable this admission at 6.11 with FT4 of 1.19. On amiodarone as above   - continue  levothyroxine 50mcg daily     DM II  A1c 6.3 on 09/02/22.  - continue dapagliflozin  - Low-intensity sliding scale     Insomnia - pta zolpidem  BPH, urinary retention - cont pta finasteride, tamsulosin   GERD - cont PTA PPI  Gout - pta allopurinol discontinued in the past   IgG Kappa monoclonal gammopathy of undetermined significance - Followed by heme/onc outpt        Diet: cardiac diet when not NPO  DVT Prophylaxis: Warfarin, ambulate  Guevara Catheter: Not present  Code Status: FULL  Fluids: none  Lines: PIV    ================================================================    Subjective/24-Hr Events:   Mr. Tanner states that he feels ok.  His breathing is well, and he denies SOB, PND, and orthopnea.  His appetite is stable, and he denies nausea, vomiting, diarrhea, and constipation.  He does not feel any fluid retention, nor note any LE edema/bloating.  His I&D site started oozing this morning.  He otherwise denies chest pain, palpitations, dizziness, headaches, acute vision changes, fevers, chills, cough, sore throat, and other signs of bleeding.    Last 24 hr care team notes reviewed.   ROS:  4 point ROS including respiratory, CV, GI and  (other than that noted in the HPI) is negative.     Medications: Reviewed in EPIC.     Physical Exam:   BP 94/74 (BP Location: Left arm)   Pulse 59   Temp 97.8  F (36.6  C) (Oral)   Resp 16   Wt 82.2 kg (181 lb 4.8 oz)   SpO2 97%   BMI 27.90 kg/m      GENERAL: Appears comfortable. Sitting upright at edge of bed, NAD.  HEENT: Eye symmetrical, no discharge or icterus bilaterally. Mucous membranes moist and without lesions.  NECK: Supple, JVD at mid neck when lying at 45   CV: +LVAD hum  RESPIRATORY: Respirations regular, even, and unlabored. Lungs CTA throughout.    GI: Soft and non distended with normoactive bowel sounds present in all quadrants. No tenderness, rebound, guarding.   EXTREMITIES: No LE edema. All extremities are warm and well perfused  NEUROLOGIC: Alert  and oriented. No focal deficits.   MUSCULOSKELETAL: No joint swelling or tenderness.   SKIN: No jaundice. No rashes or lesions. Driveline covered, dressing c/d/i.    Labs:  CMP  Recent Labs   Lab 12/12/22  0554 12/12/22  0408 12/11/22  2154 12/11/22  1634 12/11/22  1537 12/11/22  0743 12/11/22  0657 12/10/22  1635 12/10/22  1310 12/10/22  1132 12/10/22  0616 12/09/22  1611 12/09/22  1336 12/08/22  1710 12/08/22  1256     --   --   --   --   --  138  --  138  --  144  --  142   < > 133*   POTASSIUM 3.8  --   --   --   --   --  3.7  --  4.5  4.5  --  3.3*   < > 3.6   < > 3.9   CHLORIDE 98  --   --   --   --   --  101  --  103  --  105  --  102   < > 95*   CO2 28  --   --   --   --   --  26  --  22  --  26  --  27   < > 22   ANIONGAP 13  --   --   --   --   --  11  --  13  --  13  --  13   < > 16*   GLC 98 94 105* 129*  --    < > 93   < > 292*   < > 101*   < > 153*   < > 165*   BUN 27.8*  --   --   --   --   --  30.3*  --  36.0*  --  32.1*  --  41.3*   < > 52.1*   CR 1.44*  --   --   --   --   --  1.38*  --  1.61*  --  1.47*  --  1.70*   < > 1.78*   GFRESTIMATED 53*  --   --   --   --   --  55*  --  46*  --  51*  --  43*   < > 41*   CALOS 8.1*  --   --   --   --   --  8.6*  --  8.4*  --  8.1*  --  7.8*   < > 8.9   MAG  --   --   --   --  1.8  --   --   --  1.9  --   --   --   --   --  2.1   PROTTOTAL 6.8  --   --   --   --   --  7.2  --   --   --  6.8  --  7.0   < > 7.5   ALBUMIN 3.1*  --   --   --   --   --  3.2*  --   --   --  3.1*  --  3.3*   < > 3.5   BILITOTAL 0.4  --   --   --   --   --  0.5  --   --   --  0.6  --  0.7   < > 0.6   ALKPHOS 282*  --   --   --   --   --  299*  --   --   --  268*  --  299*   < > 330*   *  --   --   --   --   --  151*  --   --   --  131*  --  151*   < > 153*   *  --   --   --   --   --  128*  --   --   --  108*  --  119*   < > 122*    < > = values in this interval not displayed.       CBC  Recent Labs   Lab 12/12/22  0554 12/11/22  0657 12/10/22  0616 12/09/22  0507    WBC 7.6 6.9 6.4 7.5   RBC 4.26* 4.50 4.24* 4.24*   HGB 12.9* 13.5 12.8* 12.8*   HCT 40.8 43.3 40.4 39.2*   MCV 96 96 95 93   MCH 30.3 30.0 30.2 30.2   MCHC 31.6 31.2* 31.7 32.7   RDW 16.6* 16.6* 16.9* 16.6*    245 244 251       INR  Recent Labs   Lab 12/12/22  0554 12/11/22  0657 12/10/22  0616 12/09/22  0535   INR 2.41* 2.28* 2.25* 2.38*       Patient discussed with Dr. Tomas.        Aracelis Rose, CORY, FNP-BC, CHFN  Advanced Heart Failure Nurse Practitioner  [facility]

## 2022-12-12 NOTE — PROCEDURES
The patient's HeartMate LVAD was interrogated 12/12/2022  * Speed 5800 rpm   * Pulsatility index 2.7-3.1   * Power 4.8-5.2 Driver   * Flow 5.1-5.4 L/minute   Fluid status: hypervolemic   Alarms were reviewed, with no LVAD alarms or signs of pump malfunction.   The driveline exit site was covered, with dressing c/d/i.   All external components were inspected and showed no evidence of damage or malfunction, none replaced.   No changes to VAD settings made.      Aracelis Rose DNP, FNP-BC, CHFN  Advanced Heart Failure Nurse Practitioner  [facility]

## 2022-12-12 NOTE — PROGRESS NOTES
[id number] 12/12/2022    Patient is a 67 year old male admitted for hypervolemia with a PMH of chronic systolic heart failure secondary to NICM (LVEF 15-20% per TTE 9/2022), s/p HM3 LVAD (2/18/2020, DT) c/b recurrent drive-line infections, moderate CAD, ABHINAV (on CPAP), DMII, HTN, CKD-III, and ANA who was admitted for an acute heart failure exacerbation and concerns for recurrent drive-line infection. Teams are cards 2 and CVTS.    Neuro: A&Ox4  Cardiac: Heartmate 3 LVAD; K+ and Mg protocols, patient denies chest pain; bumex changed from drip to oral   Respiratory: WDL; on room air; no cough; clear lungs  GI/: WDL; BM today; uses bedside urinal; bowel sounds active  Endocrine: BS ACHS  Diet/Appetite: Cardiac diet; thin liquids  Skin: Drive line dressing/wound changed by surgery today x2, wound is packed with wet gauze, frequent oozing and bleeding, monitor closely; right internal jugular site from RHC this morning (bandaid)    - CVTS will continue to do dressing changes bedside, reinforce dressings overnight if become saturated, ok to change out ABD and re-dress if saturated  LDA: 2 right PIVs  Activity: Stand by assist   Pain: Patient has no complaints of pain  Plan: Keep team updated; monitor for bleeding; IV antibiotics q 8hr    - RHC done this morning
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
## 2022-12-13 NOTE — PROGRESS NOTES
[facility]   Cardiology II Service / Advanced Heart Failure  Daily Progress Note      Patient: Eliseo Tanner  MRN: [id number]  Admission Date: 12/8/2022  Hospital Day # 5    Assessment and Plan: Eliseo Tanner is a 67 year old male with chronic systolic heart failure secondary to NICM (LVEF 15-20% per TTE 9/2022), s/p HM3 LVAD (2/18/2020, DT) c/b recurrent drive-line infections, moderate CAD, ABHINAV (on CPAP), DMII, HTN, CKD-III, and ANA who was admitted for an acute heart failure exacerbation and concerns for recurrent drive-line infection.    Today's Plan:  -continue Bumex 5 mg tid  -CVTS to manage wound bleeding  -SW, care coordinator to assist with IV abx plan when ID confirms    # Recurrent driveline infections, acute on chronic  # s/p abdominal wound I&D 12/11/22  H/o recurrent MSSA infections. on IV cefazolin 10/21-11/18 due to increased driveline drainage with +MSSA, which improved but did not completely resolve.  He was transitioned to po bactrim 11/19, but then developed increased purulent drainage 1 week later, prompting this admission.  Would cultures from OSH are growing S. Aureus, would cultures from admission also growing S. Aureus. CT abdomen revealed fluid along driveline tract, increased from prior, but no organized abscess. S/p I&D per CVTS on 12/11.  - ID and CVTS consulted, appreciate recs  - stopped andres and zosyn 12/9  - stopped vanco 12/11 and started cefazolin 2g IV q8 hours per ID  - NOTE: he does not have insurance coverage for home antibiotics, historically has been able to get up to q8h infusions at his local hospital.  He would prefer to do this rather than SNF.    # Chronic systolic heart failure/HFrEF secondary to NICM   # s/p HM3 LVAD as DT on 2/2020   Stage D. NYHA Class II-III   RHC 12/12/22: RA 9, mPA 22, PCW 13    - Fluid status: near euvolemic by RHC yesterday, continue Bumex 5 mg tid (pta 60 mg bid)  - ACEi/ARB/ARNi: deferred, Entresto caused BERNARDA per notes  -  Afterload reduction: hydralazine 100 mg tid and Imdur 90 mg daily  - nBB contraindicated due to bradycardia and RV failure per prior notes  - Aldosterone antagonist deferred  - SGLT2i: Farxiga 5 mg daily - consider increase to 10 mg daily tomorrow  - SCD prophylaxis ICD  - Antiplatelet:  Aspirin 81mg daily  - Anticoagulation:  Warfarin, dosed per pharmacy with goal INR 1.8-2.5.  INR today 2.41.  - MAPs:  70-90s  - LDH:  364 (12/10)  - Remote monitoring:  CardioMEMs, has home pillow    # HTN  - continue hydral, imdur, plus amlodipine 10mg daily.     # Hypokalemia, diuretic induced  K levels have ranged low 3s, in the setting of diuresis.  - replete K per protocol  - continue to monitor     # Hepatic injury, likely cholestatic, R factor 1  Found to have elevated , , Alk Phos 330. Likely predominantly cholestatic given R factor 1. Possibly related to congestion. CT with chloeithiasis without cholecysitits.   - PTA atorvastatin has been held, resume pending LFTs  - LFTs continue to downtrend with diuresis  - RUQ ultrasound if not improving with diuresis      # CKD III  Baseline Cr 1.6-1.9 GFR low 40s.   - Cr remains stable.     # Atrial fibrillation  # Slow VT  [id number] 4. History of afib w/ RVR and aberrancy vs. detention vs. AT vs. Slow VT.  S/p DCCV on 2/28/22, back into sinus rhythm.  - continue pta amiodarone 200mg daily     # CAD  to moderate CAD with severe branch disease per [facility] 11/2019.   - cont ASA, statin as above  - BB deferred as above     # Hypothyroidism   Last TSH 5.16 on 10/18/22, 6.11 with FT4 of 1.19 this admission. On amiodarone as above   - continue levothyroxine 50mcg daily     # DM II  A1c 6.3 on 9/02/22.  - continue dapagliflozin  - Low-intensity sliding scale     # Insomnia - pta zolpidem  # BPH, urinary retention - cont pta finasteride, tamsulosin   # GERD - cont PTA PPI  # Gout - pta allopurinol discontinued in the past   # IgG Kappa monoclonal gammopathy of undetermined  significance - Followed by heme/onc outpt     FEN: 2g Na 2L FR  PROPHY:  Therapeutic INR, PPI  LINES:  PIV   DISPO:  Pending abx plan and wound stability  CODE STATUS:  Full code    Siena Aguiar, CORY, NP-C  Advanced Heart Failure/Cardiology II Service  Pager [phone] [facility] [id number]    ================================================================    Subjective/24-Hr Events:   Last 24 hr care team notes reviewed. Overnight, wound was bleeding requiring redressing. Better this morning, MAP stable. No lightheadedness. Denies LE edema.     ROS:  4 point ROS including respiratory, CV, GI and  (other than that noted in the HPI) is negative.     Medications: Reviewed in EPIC.     Physical Exam:   /86 (BP Location: Right arm)   Pulse 59   Temp 97.5  F (36.4  C) (Axillary)   Resp 16   Wt 81.9 kg (180 lb 9.6 oz)   SpO2 97%   BMI 27.79 kg/m      GENERAL: Appears comfortable, in no distress.  HEENT: Eye symmetrical, no discharge or icterus bilaterally. Mucous membranes moist and without lesions.  NECK: Supple, JVD at clavicle at 90 degrees.   CV: +mechanical LVAD hum.   RESPIRATORY: Respirations regular, even, and unlabored. Lungs CTA throughout.    GI: Soft and non distended with normoactive bowel sounds present in all quadrants. No tenderness, rebound, guarding.   EXTREMITIES: No peripheral edema. 2+ bilateral pedal pulses.   NEUROLOGIC: Alert and oriented x 3. No focal deficits.   MUSCULOSKELETAL: No joint swelling or tenderness.   SKIN: No jaundice. No rashes or lesions.     Labs:  CMP  Recent Labs   Lab 12/13/22  0340 12/12/22  2137 12/12/22  1629 12/12/22  1624 12/12/22  1405 12/12/22  0810 12/12/22  0554 12/11/22  1634 12/11/22  1537 12/11/22  0743 12/11/22  0657 12/10/22  1635 12/10/22  1310 12/10/22  1132 12/10/22  0616 12/09/22  1611 12/09/22  1336 12/08/22  1710 12/08/22  1256   NA  --   --   --   --   --   --  139  --   --   --  138  --  138  --  144  --  142   < > 133*   POTASSIUM  --   --   --   --    --   --  3.8  --   --   --  3.7  --  4.5  4.5  --  3.3*   < > 3.6   < > 3.9   CHLORIDE  --   --   --   --   --   --  98  --   --   --  101  --  103  --  105  --  102   < > 95*   CO2  --   --   --   --   --   --  28  --   --   --  26  --  22  --  26  --  27   < > 22   ANIONGAP  --   --   --   --   --   --  13  --   --   --  11  --  13  --  13  --  13   < > 16*   * 134* 174*  --  108*   < > 98   < >  --    < > 93   < > 292*   < > 101*   < > 153*   < > 165*   BUN  --   --   --   --   --   --  27.8*  --   --   --  30.3*  --  36.0*  --  32.1*  --  41.3*   < > 52.1*   CR  --   --   --   --   --   --  1.44*  --   --   --  1.38*  --  1.61*  --  1.47*  --  1.70*   < > 1.78*   GFRESTIMATED  --   --   --   --   --   --  53*  --   --   --  55*  --  46*  --  51*  --  43*   < > 41*   CALOS  --   --   --   --   --   --  8.1*  --   --   --  8.6*  --  8.4*  --  8.1*  --  7.8*   < > 8.9   MAG  --   --   --  1.7  --   --   --   --  1.8  --   --   --  1.9  --   --   --   --   --  2.1   PROTTOTAL  --   --   --   --   --   --  6.8  --   --   --  7.2  --   --   --  6.8  --  7.0   < > 7.5   ALBUMIN  --   --   --   --   --   --  3.1*  --   --   --  3.2*  --   --   --  3.1*  --  3.3*   < > 3.5   BILITOTAL  --   --   --   --   --   --  0.4  --   --   --  0.5  --   --   --  0.6  --  0.7   < > 0.6   ALKPHOS  --   --   --   --   --   --  282*  --   --   --  299*  --   --   --  268*  --  299*   < > 330*   AST  --   --   --   --   --   --  168*  --   --   --  151*  --   --   --  131*  --  151*   < > 153*   ALT  --   --   --   --   --   --  124*  --   --   --  128*  --   --   --  108*  --  119*   < > 122*    < > = values in this interval not displayed.       CBC  Recent Labs   Lab 12/12/22  1244 12/12/22  0554 12/11/22  0657 12/10/22  0616 12/09/22  0502   WBC  --  7.6 6.9 6.4 7.5   RBC  --  4.26* 4.50 4.24* 4.24*   HGB 13.2* 12.9* 13.5 12.8* 12.8*   HCT  --  40.8 43.3 40.4 39.2*   MCV  --  96 96 95 93   MCH  --  30.3 30.0 30.2 30.2   MCHC   --  31.6 31.2* 31.7 32.7   RDW  --  16.6* 16.6* 16.9* 16.6*   PLT  --  243 245 244 251       INR  Recent Labs   Lab 12/12/22  0554 12/11/22  0657 12/10/22  0616 12/09/22  0535   INR 2.41* 2.28* 2.25* 2.38*       Time/Communication  I personally spent a total of 25 minutes. Of that 15 minutes was counseling/coordination of patient's care. Plan of care discussed with patient. See my note above for details.    Patient discussed with Dr. Tomas.

## 2022-12-13 NOTE — PLAN OF CARE
D: Admitted 12/8 for an acute heart failure exacerbation and concerns for recurrent drive-line infection    PMH: chronic systolic heart failure secondary to NICM (LVEF 15-20% per TTE 9/2022), s/p HM3 LVAD (2/18/2020, DT) c/b recurrent drive-line infections, moderate CAD, ABHINAV (on CPAP), DMII, HTN, CKD-III, and ANA      I: Monitored vitals and assessed pt status.   Changed: Driveline drsg changed/reinforced by Dr. Munoz  Tele: V paced  O2: room air, home cpap at night  Mobility: SBA/independent     A: A0x4. VSS. LVAS #'s WNL. No alarms overnight. Afebrile. Urinating adequately. LBM 12/12. Driveline drsg reinforced several times. Dressing saturated through. Surgery crosscover came to bedside to change dressing. Kerlix packing remained in place. Changed out new 4x4 gauze and ABD with pressure dressing. Denies pain.      P: Continue to monitor Pt status and report changes to Cards 2.

## 2022-12-13 NOTE — PROGRESS NOTES
[facility] PROGRESS NOTE     Patient:  Eliseo Tanner   Date of birth 1955, Medical record number [id number]  Date of Visit:  12/13/2022  Date of Admission: 12/8/2022  Consult Requester:Jan Jiang MD          Assessment and Plan:   ID Problem List  1. 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 s/p I&D of fluid collection on 12/11/22; Cx +MSSA (tetra-R)  2. Was on PO suppression with Bactrim PTA  2. History of MSSA bacteremia 11/2020 secondary to MSSA driveline infection  3. History of pre-op Proteus and Enterococcus bacteremia  4. History of severe Legionella pneumonia (serogroup 1L) c/b cardiogenic shock, renal failure requiring CRRT, and resp failure requiring intubation, s/p azithromycin   5. CKD     RECOMMENDATION:  1. Continue IV cefazolin 2g q8 hrs (to be renally adjusted) for 4 week course from I&D (12/11/22 - 1/8/23)  2. Will discuss with ID pharmacist if any alternative dosing possible for patient convenience as he gets infusions at local hospital, does not have outpatient Abx coverage, however q8 hrs is standard of care for MSSA  3. Recommend ID clinic follow up in 3-4 weeks (to be seen prior to Abx stopping) and discuss PO suppressive regimen at that time  4. OK for PICC placement anytime    ASSESSMENT:  Eliseo Tanner is a 67 year old male with past medical history significant for CHF from NICM s/p HM3 and ICD placement (2/18/20), DM, CKD, chronic MSSA driveline infection on PO suppression, history of MSSA bacteremia (11/2020) 2/2 driveline infection, and pre-operative bacteremia (proteus, enterococcus) who was admitted 12/8/22 for increased driveline drainage and heart failure exacerbation. He recently completed course of IV cefazolin 10/21-11/18/22 for increased driveline drainage with MSSA (tetracycline R) on cultures. This improved but did not fully resolve drainage per patient. He was transitioned to PO Bactrim for suppression on 11/19,  renally adjusted to 1 single strength daily, but had increased purulent drainage noted 1 week later. Previous suppressive regimens of cephalexin and cefadroxil with similar breakthrough drainage despite in vitro susceptibilities. He presents afebrile, mildly elevated ESR/CRP, no leukocytosis, and OSH Cx and here with MSSA (tetracycline resistant). He was also found with heart failure exacerbation with increased edema and weight gain, though denies dyspnea. Labs also notable for elevated liver enzymes (since Sept this year) attributed to congestive hepatopathy at this time (previous viral hepatitis negative). US abdomen with normal liver, no mass, no ascites or pleural effusions, normal bile ducts. CT C/A/P with LVAD driveline showing surrounding fluid along tract increased from prior, no organized fluid collection or significant inflammatory changes and trace ascites. He was started empirically on micafungin x1, vancomycin, and zosyn, then narrowed to cefazolin monotherapy when cultures returned with MSSA (tetracycline resistant). CVTS was consulted given CT findings and patient had I&D on 12/11 with purulent drainage noted a few cm from skin exit site. I&D will likely be helpful with source control. Culture again with Staphylococcus aureus, likely same MSSA organism as initial wound culture. Would not treat with gentamicin beads (though consider if further I&D is necessary in future) or rifabutin due to elevated liver enzymes. Recommend continue cefazolin 2g q8 hrs, however this is socially difficult due to patient insurance coverage and no outpatient antibiotic coverage. He has historically gone to local hospital (4 blocks from his home) for each infusion and gotten PIV each time. He has not done this for q8 hrs, just q12 hr dosing historically. Will discuss with ID pharmacist about any alternatives. OK with PICC placement anytime.    ID will continue to follow.     Patient was discussed with Dr. Juarez.  Recommendations discussed with primary team.    Shobha Panchal PA-C  Infectious Diseases  Pager # [phone]    I spent a total of 35 minutes face-to-face and/or coordinating care of Eliseo Tanner. Over 50% of my time on the unit was spent counseling the patient and/or coordinating care.           Interim History and Events:     Bon remains afebrile, no leukocytosis. He continues to have bleeding and drainage at I&D site. He has no other complaints and feels well overall. I&D cultures with Staphylococcus aureus.          HPI: from ID consult note dated 12/9/22     Eliseo Tanner is a 67 year old male with past medical history significant for CHF from NIC s/p HM3 and ICD placement (2/18/20), DM, CKD, chronic MSSA driveline infection on PO suppression, history of MSSA bacteremia (11/2020) 2/2 driveline infection, and pre-operative bacteremia (proteus, enterococcus) who was admitted 12/8/22 for increased driveline drainage and heart failure exacerbation.      He was seen routinely in LVAD clinic on 12/7 with reports of 10 lb weight gain since Thanksgiving, with increased abdominal distention and lower extremity edema. He denied chest pain or dyspnea. Increased purulent drainage was noted from driveline by provider, patient and wife. It was recommended to present to ED for admission.      His previous MSSA isolate is tetracycline resistant. He has had at least 3 positive wound cultures for MSSA this year. One positive culture in 02/2022 for C. Acnes. He was previously on either cephalexin or cefadroxil for suppression but continued to have increased drainage with these regimens. Cephalexin seemed slightly better in the past per patient, but when switched back it did not improve his drainage in October 2022. He completed IV cefazolin from 10/21-11/18, then started on PO Bactrim for suppression per last ID note (Dr. Bhatti, 11/8/22). PO Bactrim was ordered as 1 SS daily due to renal function and with close monitoring of his  "INR (on warfarin). He has been on PO Bactrim for 2 weeks.     He denies fever, chills, rigors, nausea, vomiting, abdomina pain, diarrhea, skin rash, joint pain/swelling, or urinary symptoms. He reports improvement in drainage while on IV cefazolin but that this did not totally resolve the drainage. It was no longer purulent and \"goopy\". He started Bactrim on 11/19 and noticed increased purulent drainage within 1 week. This was purulent, yellow/brown in color. OSH culture at [facility] on 12/7 with Staph aureus (pending sensitivities), swab culture here with GPC on gram stain. Labs notable for elevated liver enzymes with , , and alk phos 308, and normal bilirubin. He remains afebrile, no leukocytosis. BNP 6219. ESR 49 and CRP 31.5. MRSA nares negative.  US abdomen with normal liver, no mass, no ascites or pleural effusions, normal bile ducts. CT C/A/P with LVAD driveline showing surrounding fluid along tract increased from prior, no organized fluid collection or significant inflammatory changes and trace ascites.      He lives with his wife. He has 2 dogs and a cat. 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          ROS:   -Focused 5 point ROS completed, pertinent positives and negatives listed above.      Physical Examination:  Temp: 99.3  F (37.4  C) Temp src: Oral BP: (!) 89/77 Pulse: 59   Resp: 16 SpO2: 97 % O2 Device: None (Room air)      Vitals:    12/09/22 1400 12/10/22 0411 12/11/22 0354 12/12/22 0600   Weight: 83.5 kg (184 lb) 82.5 kg (181 lb 14.4 oz) 83 kg (183 lb) 82.2 kg (181 lb 4.8 oz)    12/13/22 0346   Weight: 81.9 kg (180 lb 9.6 oz)     Constitutional: Pleasant adult male seen sitting up in bed, in NAD. Alert and interactive.   HEENT: NCAT, anicteric sclerae, conjunctiva clear. Moist mucous membranes.   Respiratory: Non-labored breathing, good air exchange. Lungs are clear to auscultation bilaterally, without wheezing, crackles or rhonchi. No cough noted. "   Cardiovascular: LVAD hum. LVAD in place, pressure dressing with dried blood on abdomen, no active bleeding through dressing noted on my exam  GI: Normoactive BS. Abdomen is soft, non-distended, and non-tender to palpation. No rigidity or guarding.  Skin: Warm and dry. No new rashes or lesions on exposed surfaces.  Musculoskeletal: Extremities grossly normal. No tenderness. 2+ edema present.   Neurologic: A &O x3, speech normal, answering questions appropriately. Moves all extremities spontaneously. Grossly non-focal.  Neuropsychiatric: Mentation and affect normal/appropriate.  VAD: PIV is c/d/i with no erythema, drainage, or tenderness.      Medications:    amiodarone  200 mg Oral Daily     amLODIPine  10 mg Oral Daily     aspirin  81 mg Oral Daily     [Held by provider] atorvastatin  20 mg Oral Daily     bumetanide  5 mg Oral TID     ceFAZolin  2 g Intravenous Q8H     dapagliflozin  5 mg Oral Daily     finasteride  5 mg Oral Daily     FLUoxetine  30 mg Oral Daily     hydrALAZINE  100 mg Oral TID     insulin aspart  1-3 Units Subcutaneous TID AC     insulin aspart  1-3 Units Subcutaneous At Bedtime     isosorbide mononitrate  90 mg Oral Daily     levothyroxine  75 mcg Oral QAM AC     magnesium oxide  400 mg Oral or Feeding Tube Daily     multivitamin RENAL  1 tablet Oral Daily     pantoprazole  40 mg Oral QAM AC     potassium chloride  40 mEq Oral Daily     potassium chloride ER  80 mEq Oral BID     sodium chloride (PF)  3 mL Intracatheter Q8H     tamsulosin  0.8 mg Oral Daily     Warfarin Therapy Reminder  1 each Oral See Admin Instructions       Infusions/Drips:    BETA BLOCKER NOT PRESCRIBED       - MEDICATION INSTRUCTIONS -       - MEDICATION INSTRUCTIONS -         Laboratory Data:   No results found for: ACD4    Inflammatory Markers    Recent Labs   Lab Test 12/10/22  0616 12/08/22  1256 08/17/21  0807 02/16/21  2219 02/15/21  1910 02/11/21  0838 12/17/20  0756 12/14/20  0815   SED  --  49*  --  72* 47*  --    --   --    CRP 26.30* 31.50* 4.9 270.0* 270.0* 8.6 8.0 6.1       Metabolic Studies       Recent Labs   Lab Test 12/13/22  1102 12/13/22  0725 12/13/22  0340 12/12/22  2137 12/12/22  1629 12/12/22  1624 12/12/22  1405 12/12/22  0810 12/12/22  0554 12/11/22  1634 12/11/22  1537 12/11/22  0743 12/11/22  0657 12/10/22  1635 12/10/22  1310 12/10/22  1132 12/10/22  0616 12/09/22  2122 12/09/22  1836 12/09/22  1611 12/09/22  1336 09/11/22  1132 09/11/22  1044 09/11/22  0731 09/11/22  0727 09/02/22  2202 09/02/22  1656 02/24/21  1855 02/24/21  0442 02/23/21  2130 02/15/21  2127 02/15/21  1910 02/13/20  0219 02/13/20  0206   NA  --  137  --   --   --   --   --   --  139  --   --   --  138  --  138  --  144  --   --   --  142   < > 140  --  142   < > 133*   < > 132*  --    < > 129*   < > 132*   POTASSIUM  --  3.9  --   --   --   --   --   --  3.8  --   --   --  3.7  --  4.5  4.5  --  3.3*  --  3.5  --  3.6   < > 4.9  --  3.6   < > 3.7   < > 4.1  --    < > 5.1   < > 4.1  4.2   CHLORIDE  --  94*  --   --   --   --   --   --  98  --   --   --  101  --  103  --  105  --   --   --  102   < > 100  --  102   < > 95*   < > 99  --    < > 97   < > 96   CO2  --  31*  --   --   --   --   --   --  28  --   --   --  26  --  22  --  26  --   --   --  27   < > 29  --  29   < > 26   < > 25  --    < > 16*   < > 22   ANIONGAP  --  12  --   --   --   --   --   --  13  --   --   --  11  --  13  --  13  --   --   --  13   < > 11  --  11   < > 12   < > 8  --    < > 17*   < > 14   BUN  --  26.3*  --   --   --   --   --   --  27.8*  --   --   --  30.3*  --  36.0*  --  32.1*  --   --   --  41.3*   < > 50.5*  --  49.7*   < > 48.8*   < > 44*  --    < > 78*   < > 34*   CR  --  1.41*  --   --   --   --   --   --  1.44*  --   --   --  1.38*  --  1.61*  --  1.47*  --   --   --  1.70*   < > 1.73*  --  1.59*   < > 2.00*   < > 3.84*  --    < > 3.23*   < > 1.58*   GFRESTIMATED  --  54*  --   --   --   --   --   --  53*  --   --   --  55*  --  46*  --  51*  --    --   --  43*   < > 42*  --  47*   < > 35*   < > 15*  --    < > 19*   < > 45*   * 103* 115* 134* 174*  --  108*   < > 98   < >  --    < > 93   < > 292*   < > 101*   < >  --    < > 153*   < > 181*   < > 129*   < > 175*   < > 125*  --    < > 215*   < > 154*   A1C  --   --   --   --   --   --   --   --   --   --   --   --   --   --   --   --   --   --   --   --   --   --   --   --   --   --  6.3*  --   --   --   --   --    < >  --    CALOS  --  8.7*  --   --   --   --   --   --  8.1*  --   --   --  8.6*  --  8.4*  --  8.1*  --   --   --  7.8*   < > 9.3  --  8.8   < > 8.4*   < > 8.0*  --    < > 8.7   < > 8.9   PHOS  --   --   --   --   --   --   --   --   --   --   --   --   --   --   --   --   --   --   --   --   --   --  3.7  --  3.9   < >  --   --   --   --    < > 5.0*   < > 5.4*   MAG  --   --   --   --   --  1.7  --   --   --   --  1.8  --   --   --  1.9  --   --   --   --   --   --    < > 2.2  --  2.2   < > 1.9   < >  --   --    < > 2.1   < > 2.0   LACT  --   --   --   --   --   --   --   --   --   --   --   --   --   --   --   --   --   --   --   --   --   --   --   --   --   --   --   --  0.7 1.4   < > 6.9*   < > 9.5*   CKT  --   --   --   --   --   --   --   --   --   --   --   --   --   --   --   --   --   --   --   --   --   --   --   --   --   --   --   --   --   --   --  884*  --  39    < > = values in this interval not displayed.       Hepatic Studies    Recent Labs   Lab Test 12/13/22  0725 12/12/22  0554 12/11/22  0657 12/10/22  1310 12/10/22  0616 12/09/22  1336 12/09/22  0502 12/08/22  1256 10/18/22  1250   BILITOTAL 0.5 0.4 0.5  --  0.6 0.7 0.7   < > 0.5   ALKPHOS 306* 282* 299*  --  268* 299* 288*   < > 175*   ALBUMIN 3.3* 3.1* 3.2*  --  3.1* 3.3* 3.3*   < > 4.0   * 168* 151*  --  131* 151* 131*   < > 93*   ALT 76* 124* 128*  --  108* 119* 107*   < > 88*   LDH  --   --   --  364*  --   --   --   --  283*    < > = values in this interval not displayed.       Pancreatitis testing    Recent  Labs   Lab Test 09/03/22  0630 02/08/20  0408   TRIG 53 57       Hematology Studies      Recent Labs   Lab Test 12/13/22  0725 12/12/22  1244 12/12/22  0554 12/11/22  0657 12/10/22  0616 12/09/22  0502 12/08/22  1710 03/09/21  0510 03/07/21  0543 03/06/21  0430 03/05/21  0400 03/04/21  0405 03/03/21  0455 03/02/21  0401   WBC 9.1  --  7.6 6.9 6.4 7.5 8.6   < > 4.5 4.4 5.4 5.6 7.0 7.3   ANEU  --   --   --   --   --   --   --   --  2.6 2.8 3.6 3.8 5.3 5.6   ALYM  --   --   --   --   --   --   --   --  0.9 0.7* 0.8 0.7* 0.6* 0.5*   GIULIANO  --   --   --   --   --   --   --   --  0.7 0.6 0.7 0.8 0.9 0.9   AEOS  --   --   --   --   --   --   --   --  0.3 0.2 0.2 0.1 0.1 0.1   HGB 13.3 13.2* 12.9* 13.5 12.8* 12.8* 13.3   < > 7.9* 8.0* 7.7* 8.2* 8.1* 9.2*   HCT 42.5  --  40.8 43.3 40.4 39.2* 40.3   < > 25.9* 25.3* 24.8* 26.2* 25.4* 29.2*     --  243 245 244 251 251   < > 208 196 210 224 236 218    < > = values in this interval not displayed.       Arterial Blood Gas Testing    Recent Labs   Lab Test 09/03/22  0630 03/11/21  1330 02/25/21  0915 02/24/21  0442 02/22/21  1539 02/22/21  0353 02/21/21  0854 02/21/21  0335 02/20/21  1544 02/20/21  1303 02/20/21  1037 02/20/21  0912 02/20/21  0345   PH  --   --   --   --   --  7.46*  --  7.43  --  7.38 7.41  --  7.45   PCO2  --   --   --   --   --  34*  --  37  --  43 38  --  36   PO2  --   --   --   --   --  70*  --  63*  --  125* 107*  --  124*   HCO3  --   --   --   --   --  24  --  25  --  25 24  --  25   O2PER 0 21 21 21.0   < > 21.0  21.0   < > 21.0  21.0   < > 40 40   < > 40.0  40.0    < > = values in this interval not displayed.        Urine Studies     Recent Labs   Lab Test 02/16/21  0225 11/17/20  0825 02/22/20  0944 02/13/20  0334 02/07/20 2029   URINEPH 5.5 5.5 5.5 6.0 5.0   NITRITE Negative Negative Negative Negative Negative   LEUKEST Negative Negative Small* Small* Negative   WBCU 0 0 2 81* 3       Vancomycin Levels     Recent Labs   Lab Test  12/10/22  1948 02/16/20  0400 02/14/20  0331   VANCOMYCIN 11.3 18.5 16.2       Microbiology:  Culture   Date Value Ref Range Status   12/11/2022 No anaerobic organisms isolated after 2 days  Preliminary   12/11/2022 No growth after 2 days  Preliminary   12/11/2022 1+ Staphylococcus aureus (A)  Final     Comment:     Susceptibilities done on previous cultures   12/11/2022 1+ Staphylococcus aureus (A)  Final     Comment:     Susceptibilities done on previous cultures   12/08/2022 No growth after 4 days  Preliminary   12/08/2022 2+ Staphylococcus aureus (A)  Final   12/08/2022 2+ Staphylococcus aureus (A)  Final   12/08/2022 No growth after 4 days  Preliminary   10/18/2022 2+ Staphylococcus aureus (A)  Final   10/18/2022 2+ Staphylococcus aureus (A)  Final   09/20/2022 2+ Staphylococcus aureus (A)  Final   09/20/2022 1+ Normal freeman  Final   09/20/2022 No anaerobic organisms isolated  Final   02/10/2022 1+ Staphylococcus aureus (A)  Final   02/10/2022 1+ Cutibacterium (Propionibacterium) acnes (A)  Final     Comment:     Susceptibility testing of Cutibacterium (Propionibacterium) species is not done from this source. This organism is susceptible to penicillin and cefotaxime and most are susceptible to clindamycin.   08/17/2021 No Growth  Final   08/17/2021 No Growth  Final   08/17/2021 1+ Staphylococcus aureus (A)  Final   08/17/2021 No anaerobic organisms isolated  Final       Last check of C difficile  C Diff Toxin B PCR   Date Value Ref Range Status   11/15/2020 Negative NEG^Negative Final     Comment:     Negative: C. difficile target DNA sequences NOT detected, presumed negative   for C.difficile toxin B or the number of bacteria present may be below the   limit of detection for the test.  FDA approved assay performed using [employer] real-time PCR.  A negative result does not exclude actual disease due to C. difficile and may   be due to improper collection, handling and storage of the specimen or the    number of organisms in the specimen is below the detection limit of the assay.         Imaging:  CT C/A/P 12/8/22  IMPRESSION:   1. LVAD drive line with surrounding fluid in the along the tract in  the subcutaneous right anterior chest wall increased from previous.  Trace fluid surrounding the LVAD outflow tract , similar to prior. No  organized fluid collection or significant associated inflammatory  changes.  2. Left inguinal lymphadenopathy and stable prominent mediastinal  lymph nodes, likely reactive  3. Cholelithiasis without evidence of cholecystitis. Mild intrahepatic  biliary ductal dilation.  4. Trace ascites     US Abd limited 12/8/22  IMPRESSION:   1.  Normal grayscale and Doppler evaluation of the liver, as well as  portal and hepatic vasculature.  2.  Decompressed gallbladder without evidence of acute cholecystitis.  No evidence of cholestatic disease process. There is a 3 mm  gallbladder cholesterol polyp which does not require follow-up.     ECHO  TTE 12/10/22  Interpretation Summary  HM3 LVAD at 5800 rpm.  Left ventricular function is decreased. The ejection fraction is 10-15%  (severely reduced). Severe diffuse hypokinesis is present. The LVAD inflow cannula is seen in the apex. It is not approximated to the septum. The interventricular septum is deviated slightly towards the LV. The inflow  cannula velocities are somewhat low (40 cm/s) but outflow cannula velocities are normal (120 cm/s).  Moderate right ventricular dilation is present. Global right ventricular  function is moderately reduced.  Mild to moderate mitral and tricuspid insufficiency are present.  The AV appears to open with each beat. There is mild-moderate aortic  regurgitation.  The estimated PA systolic pressure is at least 42 mmHg.  IVC diameter >2.1 cm collapsing <50% with sniff suggests a high RA pressure estimated at 15 mmHg or greater.  This study was compared with the study from 09/08/2022. No significant changes noted.

## 2022-12-13 NOTE — PROGRESS NOTES
Care Management Follow Up    Length of Stay (days): 5    Expected Discharge Date: 12/16/2022     Concerns to be Addressed: Discharge planning  Patient plan of care discussed at interdisciplinary rounds: Yes    Anticipated Discharge Disposition: Home     Anticipated Discharge Services: Outpatient infusion, Outpatient wound cares  Anticipated Discharge DME: Wound vac    Education Provided on the Discharge Plan: Yes  Patient/Family in Agreement with the Plan: Yes    Additional Information:  Pt does not have coverage for home IV antibiotics so pt/spouse would prefer outpatient infusions at their local hospital where pt has done them in the past. Per ID note, plan is IV ancef q 8 hours for 4 weeks.    Per CVTS, pt will also likely need wound vac placed on driveline I&D site, but too much bleeding to place at this time.    This writer called and spoke with Shasta, pharmacist at [facility] who confirmed they can do q8 IV antibiotics for pt, ideally would like pt on 6am, 2pm, 10pm dosing schedule (pharmacy updated). Shasta stated she can also coordinate with nursing to schedule outpatient wound vac dressing changes. Shasta requested a call once discharge timing determined. She will need scripts for outpatient infusion and for wound vac dressing changes.    [facility] (Pharmacist for scheduling)  Ph: [phone]  Fax: [phone]    CC will continue to monitor patient's medical condition and progress towards discharge.  Carmita Farnsworth RN BSN  6C Unit Care Coordinator  Phone number: [phone]  Pager: [phone]

## 2022-12-13 NOTE — PROGRESS NOTES
D: Stopped by to see patient. Pt reports feeling pretty well, but c/o bleeding at the site of his surgery.  No other VAD related questions or concerns at this time.   I: Discussed POC and provided support and listened to patient's thoughts and concerns.  P: Continue to follow patient and address any questions or concerns patient and or caregiver may have.

## 2022-12-13 NOTE — PROCEDURES
The patient's HeartMate LVAD was interrogated 12/13/2022  * Speed 5800 rpm   * Pulsatility index 2.4 --3.3   * Power 4.8 Driver   * Flow 5.1 L/minute   Fluid status: euvolemic   Alarms were reviewed, and notable for PI events with speed drops.   The driveline exit site was inspected, pressures dressing cdi.   All external components were inspected and showed no evidence of damage or malfunction, none replaced.   No changes to VAD settings made

## 2022-12-14 NOTE — SUMMARY OF CARE
-CVTS personnel did the first dressing change of the day.  -This dressing was saturated with blood by late afternoon.  -This dressing was changed per instructions from CVTS.

## 2022-12-14 NOTE — PLAN OF CARE
"Pt is A/O x 4. Up ad tiffanie, independent w/cares  VSS, RA. No c/o pain.   Tele SB w/BBB, LVAD.   Lung sounds WDL. No c/o SOB.   LVAD WDL. Driveline incision covered w/ gauze & hemostatic dressing & silver nitrate via CVTS. Site saturated w/ sanguinous oozing, reinforced w/ ABD, MD notified. Hbg recheck 1 unit lower than am, Held coumadin & aspirin today.  1 unit plasma infusing @125 ml/hr, continue to monitor.  Skin dry and cracked on BLE, scattered bruising  BS active x4. Adequate urine output via urinal. Last BM 12/14.  BG checks 3x daily before meals. Low sodium/low fat diet, tolerating well.   Plans for discharge pending. Continue to monitor driveline site closely. Continue plan of care.   Patient currently resting in bed with call light in reach.        Problem: Plan of Care - These are the overarching goals to be used throughout the patient stay.    Goal: Plan of Care Review  Description: The Plan of Care Review/Shift note should be completed every shift.  The Outcome Evaluation is a brief statement about your assessment that the patient is improving, declining, or no change.  This information will be displayed automatically on your shift note.  Outcome: Progressing  Goal: Patient-Specific Goal (Individualized)  Description: You can add care plan individualizations to a care plan. Examples of Individualization might be:  \"Parent requests to be called daily at 9am for status\", \"I have a hard time hearing out of my right ear\", or \"Do not touch me to wake me up as it startles me\".  Outcome: Progressing  Goal: Absence of Hospital-Acquired Illness or Injury  Outcome: Progressing  Intervention: Identify and Manage Fall Risk  Recent Flowsheet Documentation  Taken 12/14/2022 1537 by Maritza Medel RN  Safety Promotion/Fall Prevention: activity supervised  Intervention: Prevent Skin Injury  Recent Flowsheet Documentation  Taken 12/14/2022 1537 by Maritza Medel, RN  Body Position:    position changed independently    " turned  Intervention: Prevent and Manage VTE (Venous Thromboembolism) Risk  Recent Flowsheet Documentation  Taken 12/14/2022 1537 by Maritza Medel RN  VTE Prevention/Management: SCDs (sequential compression devices) off  Goal: Optimal Comfort and Wellbeing  Outcome: Progressing  Intervention: Monitor Pain and Promote Comfort  Recent Flowsheet Documentation  Taken 12/14/2022 1537 by Maritza Medel RN  Pain Management Interventions: medication (see MAR)  Goal: Readiness for Transition of Care  Outcome: Progressing     Problem: Diabetes Comorbidity  Goal: Blood Glucose Level Within Targeted Range  Outcome: Progressing     Problem: Hypertension Comorbidity  Goal: Blood Pressure in Desired Range  Outcome: Progressing  Intervention: Maintain Blood Pressure Management  Recent Flowsheet Documentation  Taken 12/14/2022 1537 by Maritza Medel RN  Medication Review/Management: medications reviewed     Problem: Ventricular Assist Device  Goal: Optimal Adjustment to Device  Outcome: Progressing  Goal: Absence of Bleeding  Outcome: Progressing  Intervention: Monitor and Manage Bleeding  Recent Flowsheet Documentation  Taken 12/14/2022 1537 by Maritza Medel RN  Bleeding Precautions: (Driveline I&D site monitored closely) --  Goal: Absence of Embolism Signs and Symptoms  Outcome: Progressing  Intervention: Prevent or Manage Embolism  Recent Flowsheet Documentation  Taken 12/14/2022 1537 by Maritza Medel RN  VTE Prevention/Management: SCDs (sequential compression devices) off  Goal: Optimal Blood Flow  Outcome: Progressing  Goal: Absence of Infection Signs and Symptoms  Outcome: Progressing  Goal: Effective Right-Sided Heart Function  Outcome: Progressing  Intervention: Manage Decreased Right Heart Function  Recent Flowsheet Documentation  Taken 12/14/2022 1537 by Maritza Medel RN  Medication Review/Management: medications reviewed

## 2022-12-14 NOTE — PROGRESS NOTES
Notified by bedside RN that driveline dressing was saturated. Came to bedside in which I removed his dressing and did not appreciate any active bleeding or oozing that would be amenable to chemical cauterization. Re-packed his dressing.

## 2022-12-14 NOTE — PROGRESS NOTES
Cardiovascular Surgery Progress Note  12/14/2022         Assessment and Plan:     Eliseo Tanner is a 67 year old male with chronic systolic heart failure secondary to NICM (LVEF 15-20% per TTE 9/2022), s/p HM3 LVAD (2/18/2020, DT) c/b recurrent drive-line infections, moderate CAD, ABHINAV (on CPAP), DMII, HTN, CKD-III, and ANA who was admitted for an acute heart failure exacerbation and concerns for recurrent drive-line infection.  S/P I&D of driveline site on 12/11/22 with Dr Mac Jaramillo.     - Having persistent bleeding from wound bed in multiple places. Recommend holding Coumadin for 1 day to aid in clot formation.  - 1 unit Plasma today for continued bleeding with INR 1.86.  - QuickClot gauze in place 12/14 afternoon. Keep in place and remove with saline on 12/15 am.   - Wait for Vac dressing until bleeding has stopped.    Discussed with Dr Jaramillo through written and verbal communication.      Diego Zuñiga PA-C  Cardiothoracic Surgery  Pager [phone]    2:48 PM   December 14, 2022        Interval History:     Overnight events- wound repacked for bleeding.   States pain is well managed on current regimen. Slept well overnight.         Physical Exam:   Blood pressure (!) 83/64, pulse 59, temperature 99  F (37.2  C), temperature source Oral, resp. rate 16, weight 80.1 kg (176 lb 8 oz), SpO2 97 %.  Vitals:    12/12/22 0600 12/13/22 0346 12/14/22 0446   Weight: 82.2 kg (181 lb 4.8 oz) 81.9 kg (180 lb 9.6 oz) 80.1 kg (176 lb 8 oz)      Gen: A&Ox4, NAD  Neuro: no focal deficits   CV: VAD present, HD stable  Pulm: normal breathing on RA  Incision: driveline site with open wound, 9 cm x 3 cm x 2 cm deep. Bleeding from medial aspect of wound x 2 places, silver nitrate applied. Suspected bleeding from under driveline but unable to localize. Re-packed wound with QuikClot gauze.          Data:    Imaging:  reviewed recent imaging, no acute concerns    Labs:  BMP  Recent Labs   Lab 12/14/22  1141 12/14/22  0736  12/14/22  0651 12/13/22  2208 12/13/22  1102 12/13/22  0725 12/12/22  0810 12/12/22  0554 12/11/22  0743 12/11/22  0657   NA  --   --  135*  --   --  137  --  139  --  138   POTASSIUM  --   --  4.1  --   --  3.9  --  3.8  --  3.7   CHLORIDE  --   --  96*  --   --  94*  --  98  --  101   CALOS  --   --  8.4*  --   --  8.7*  --  8.1*  --  8.6*   CO2  --   --  29  --   --  31*  --  28  --  26   BUN  --   --  30.4*  --   --  26.3*  --  27.8*  --  30.3*   CR  --   --  1.46*  --   --  1.41*  --  1.44*  --  1.38*   * 120* 104* 196*   < > 103*   < > 98   < > 93    < > = values in this interval not displayed.     CBC  Recent Labs   Lab 12/14/22 0651 12/13/22  0725 12/12/22  1244 12/12/22  0554 12/11/22  0657   WBC 9.9 9.1  --  7.6 6.9   RBC 4.17* 4.43  --  4.26* 4.50   HGB 12.5* 13.3 13.2* 12.9* 13.5   HCT 39.4* 42.5  --  40.8 43.3   MCV 95 96  --  96 96   MCH 30.0 30.0  --  30.3 30.0   MCHC 31.7 31.3*  --  31.6 31.2*   RDW 15.9* 16.3*  --  16.6* 16.6*    278  --  243 245     INR  Recent Labs   Lab 12/14/22 0651 12/13/22 0725 12/12/22  0554 12/11/22  0657   INR 1.86* 2.07* 2.41* 2.28*

## 2022-12-14 NOTE — PROGRESS NOTES
[facility] PROGRESS NOTE     Patient:  Eliseo Tanner   Date of birth 1955, Medical record number [id number]  Date of Visit:  12/14/2022  Date of Admission: 12/8/2022  Consult Requester:Jan Jiang MD          Assessment and Plan:   ID Problem List  1. 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 s/p I&D of fluid collection on 12/11/22; Cx +MSSA (tetra-R)  2. Was on PO suppression with Bactrim PTA  2. History of MSSA bacteremia 11/2020 secondary to MSSA driveline infection  3. History of pre-op Proteus and Enterococcus bacteremia  4. History of severe Legionella pneumonia (serogroup 1L) c/b cardiogenic shock, renal failure requiring CRRT, and resp failure requiring intubation, s/p azithromycin   5. CKD     RECOMMENDATION:  1. Continue IV cefazolin 2g q8 hrs for 4 week course from I&D (12/11/22 - 1/8/23). See OPAT note below for additional details  2. Will need weekly CBC w diff, BMP monitoring  3. OK for midline placement anytime (PICC ok)   4. ID clinic follow up in 3-4 weeks (to be seen prior to IV Abx stopping) and to discuss PO suppressive regimen at that time  5. Recommend discuss with patient what his self-pay options are to see if this is affordable or else q8 hr visit at local hospital as planned    ASSESSMENT:  Eliseo Tanner is a 67 year old male with past medical history significant for CHF from NICM s/p HM3 and ICD placement (2/18/20), DM, CKD, chronic MSSA driveline infection on PO suppression, history of MSSA bacteremia (11/2020) 2/2 driveline infection, and pre-operative bacteremia (proteus, enterococcus) who was admitted 12/8/22 for increased driveline drainage and heart failure exacerbation. He recently completed course of IV cefazolin 10/21-11/18/22 for increased driveline drainage with MSSA (tetracycline R) on cultures. This improved but did not fully resolve drainage per patient. He was transitioned to PO Bactrim for suppression on 11/19,  renally adjusted to 1 single strength daily, but had increased purulent drainage noted 1 week later. Previous suppressive regimens of cephalexin and cefadroxil with similar breakthrough drainage despite in vitro susceptibilities. He presents afebrile, mildly elevated ESR/CRP, no leukocytosis, and OSH Cx and here with MSSA (tetracycline resistant). He was also found with heart failure exacerbation with increased edema and weight gain, though denies dyspnea. Labs also notable for elevated liver enzymes (since Sept this year) attributed to congestive hepatopathy at this time (previous viral hepatitis negative). US abdomen with normal liver, no mass, no ascites or pleural effusions, normal bile ducts. CT C/A/P with LVAD driveline showing surrounding fluid along tract increased from prior, no organized fluid collection or significant inflammatory changes and trace ascites. He was started empirically on micafungin x1, vancomycin, and zosyn, then narrowed to cefazolin monotherapy when cultures returned with MSSA (tetracycline resistant). CVTS was consulted given CT findings and patient had I&D on 12/11 with purulent drainage noted a few cm from skin exit site. I&D will likely be helpful with source control. Culture again with Staphylococcus aureus, likely same MSSA organism as initial wound culture. Would not treat with gentamicin beads (though consider if further I&D is necessary in future) or rifabutin due to elevated liver enzymes. Recommend continue cefazolin 2g q8 hrs, however this is socially difficult due to patient insurance coverage and no outpatient IV antibiotic coverage. He has historically gone to local hospital (4 blocks from his home) for each infusion and gotten PIV each time. He has not done this for q8 hrs, just q12 hr dosing historically. Discussed at length with ID pharmacist regarding alternative regimens and cefazolin q8 would be preferred. Alternative option would be once daily vancomcyin, however  this would require closely monitoring renal function and trough levels. Ceftriaxone once daily dosing would not be recommended due to no good evidence for MSSA. Shared decision making with patient, who prefers cefazolin and is agreeable to q8 hr dosing at local hospital. We are OK if patient needs midline vs PICC placed, primary team discussing with his local hospital. We also discussed option of self-pay which has not been quoted to patient as far as I can see and will request this be completed. If not affordable to patient, will proceed with outpatient infusions at local hospital q8 hrs (may have some flexibility to q8-10 hrs overnight and closer daytime dosing per pharmacy).       ID will sign off at this time    Patient was discussed with Dr. Juarez. Recommendations discussed with primary team.    Shobha Panchal PA-C  Infectious Diseases  Pager # [phone]    I spent a total of 35 minutes face-to-face and/or coordinating care of Eliseo Tanner. Over 50% of my time on the unit was spent counseling the patient and/or coordinating care.      Prolonged Parenteral/Oral Antibiotic Recommendations and ID Follow up  This template provides final ID recommendations as of this date. If there are clinical changes or questions please call the ID team.     Infectious Diseases Indication: MSSA Driveline infection s/p I&D 12/11/22    Antibiotic Information  Name of Antibiotic Dose of Antibiotic1 Pharmacy to assist with dosing Y/N Anticipated duration Effective start date2 End date   Cefazolin 2g q8 hrs Y (regarding timing, if able to extend overnight to q8-10 hrs for patient convenience) 4 weeks 12/11/22 ~1/8/23 (TBD by outpatient ID provider)                   1.Dose of antibiotic will need to be renally adjusted if creatinine clearance changes  2.Effective start date is the date of therapy with appropriate spectrum    Method of antibiotic delivery: Line to be determined by primary team. At the end of therapy should the line be  "removed? Yes. Selecting \"yes\" will function as written order to remove PICC line at the end of therapy.    Tentative plans for disposition: Home    Weekly labs required: CBC with diff and BMP. Dr. Bhatti will follow labs at discharge until ID follow up. Please have labs faxed to ID clinic.    Appointment to be scheduled: Within 4 weeks of discharge. Type of ID Clinic Appointment Virtual Video visit. Appointment will be scheduled with: Magy. Routine hospital follow up appointments are 30 minutes. If 60 minutes is necessary due to complexity of case indicate that a 60 min appointment is required. Appointment time Appointment Time: 30 minutes. If the patient remains in the hospital at this date please re-consult ID.     Imaging for ID follow up: ID Imaging: Follow up imaging for ID purposes not recommended at the time of this documentation. Dr. Bhatti will follow up on the results of the imaging. Image order will be placed by the primary team at the time of discharge.     ID provider to route this note to the appropriate Epic pools: [facility] INFECTIOUS DISEASE ADULT LETTY, PANDA, [facility]    [facility]/ID Clinic Information:  [facility], Clinic [address] [place] [address]  [address]  Phone: [phone]  Fax: [phone] (Attention Nilsa Toledo)             Interim History and Events:     Bon remains afebrile, no leukocytosis. He continues to have bleeding and drainage at I&D site. He has no other complaints and feels well overall.         HPI: from ID consult note dated 12/9/22     Eliseo Tanner is a 67 year old male with past medical history significant for CHF from NICM s/p HM3 and ICD placement (2/18/20), DM, CKD, chronic MSSA driveline infection on PO suppression, history of MSSA bacteremia (11/2020) 2/2 driveline infection, and pre-operative bacteremia (proteus, enterococcus) who was admitted 12/8/22 for increased driveline drainage and heart failure exacerbation.      He was seen routinely in LVAD " "clinic on 12/7 with reports of 10 lb weight gain since Thanksgiving, with increased abdominal distention and lower extremity edema. He denied chest pain or dyspnea. Increased purulent drainage was noted from driveline by provider, patient and wife. It was recommended to present to ED for admission.      His previous MSSA isolate is tetracycline resistant. He has had at least 3 positive wound cultures for MSSA this year. One positive culture in 02/2022 for C. Acnes. He was previously on either cephalexin or cefadroxil for suppression but continued to have increased drainage with these regimens. Cephalexin seemed slightly better in the past per patient, but when switched back it did not improve his drainage in October 2022. He completed IV cefazolin from 10/21-11/18, then started on PO Bactrim for suppression per last ID note (Dr. Bhatti, 11/8/22). PO Bactrim was ordered as 1 SS daily due to renal function and with close monitoring of his INR (on warfarin). He has been on PO Bactrim for 2 weeks.     He denies fever, chills, rigors, nausea, vomiting, abdomina pain, diarrhea, skin rash, joint pain/swelling, or urinary symptoms. He reports improvement in drainage while on IV cefazolin but that this did not totally resolve the drainage. It was no longer purulent and \"goopy\". He started Bactrim on 11/19 and noticed increased purulent drainage within 1 week. This was purulent, yellow/brown in color. OSH culture at [facility] on 12/7 with Staph aureus (pending sensitivities), swab culture here with GPC on gram stain. Labs notable for elevated liver enzymes with , , and alk phos 308, and normal bilirubin. He remains afebrile, no leukocytosis. BNP 6219. ESR 49 and CRP 31.5. MRSA nares negative.  US abdomen with normal liver, no mass, no ascites or pleural effusions, normal bile ducts. CT C/A/P with LVAD driveline showing surrounding fluid along tract increased from prior, no organized fluid collection or " significant inflammatory changes and trace ascites.      He lives with his wife. He has 2 dogs and a cat. 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          ROS:   -Focused 5 point ROS completed, pertinent positives and negatives listed above.      Physical Examination:  Temp: 98.9  F (37.2  C) Temp src: Oral BP: 95/69 Pulse: 59   Resp: 16 SpO2: 95 % O2 Device: None (Room air)      Vitals:    12/10/22 0411 12/11/22 0354 12/12/22 0600 12/13/22 0346   Weight: 82.5 kg (181 lb 14.4 oz) 83 kg (183 lb) 82.2 kg (181 lb 4.8 oz) 81.9 kg (180 lb 9.6 oz)    12/14/22 0446   Weight: 80.1 kg (176 lb 8 oz)     Constitutional: Pleasant adult male seen sitting up in bed, in NAD. Alert and interactive.   HEENT: NCAT, anicteric sclerae, conjunctiva clear. Moist mucous membranes.   Respiratory: Non-labored breathing, good air exchange. Lungs are clear to auscultation bilaterally, without wheezing, crackles or rhonchi. No cough noted.   Cardiovascular: LVAD hum. LVAD in place, increased pressure dressing with dried blood soaking through gauze on abdomen/upper thigh  GI: Normoactive BS. Abdomen is soft, non-distended, and non-tender to palpation. No rigidity or guarding.  Skin: Warm and dry. No new rashes or lesions on exposed surfaces.  Musculoskeletal: Extremities grossly normal. No tenderness. 2+ edema present.   Neurologic: A &O x3, speech normal, answering questions appropriately. Moves all extremities spontaneously. Grossly non-focal.  Neuropsychiatric: Mentation and affect normal/appropriate.  VAD: PIV is c/d/i with no erythema, drainage, or tenderness.      Medications:    amiodarone  200 mg Oral Daily     amLODIPine  10 mg Oral Daily     aspirin  81 mg Oral Daily     [Held by provider] atorvastatin  20 mg Oral Daily     bumetanide  4 mg Oral TID     ceFAZolin  2 g Intravenous Q8H     dapagliflozin  5 mg Oral Daily     finasteride  5 mg Oral Daily     FLUoxetine  30 mg Oral Daily     hydrALAZINE  100 mg Oral  TID     insulin aspart  1-3 Units Subcutaneous TID AC     insulin aspart  1-3 Units Subcutaneous At Bedtime     isosorbide mononitrate  90 mg Oral Daily     levothyroxine  75 mcg Oral QAM AC     magnesium oxide  400 mg Oral or Feeding Tube Daily     multivitamin RENAL  1 tablet Oral Daily     pantoprazole  40 mg Oral QAM AC     potassium chloride  40 mEq Oral Daily     potassium chloride ER  80 mEq Oral BID     sodium chloride (PF)  3 mL Intracatheter Q8H     tamsulosin  0.8 mg Oral Daily     warfarin ANTICOAGULANT  4 mg Oral ONCE at 18:00     Warfarin Therapy Reminder  1 each Oral See Admin Instructions       Infusions/Drips:    BETA BLOCKER NOT PRESCRIBED       - MEDICATION INSTRUCTIONS -       - MEDICATION INSTRUCTIONS -         Laboratory Data:   No results found for: ACD4    Inflammatory Markers    Recent Labs   Lab Test 12/10/22  0616 12/08/22  1256 08/17/21  0807 02/16/21  2219 02/15/21  1910 02/11/21  0838 12/17/20  0756 12/14/20  0815   SED  --  49*  --  72* 47*  --   --   --    CRP 26.30* 31.50* 4.9 270.0* 270.0* 8.6 8.0 6.1       Metabolic Studies       Recent Labs   Lab Test 12/14/22  0736 12/14/22  0651 12/13/22  2208 12/13/22  1627 12/13/22  1621 12/13/22  1102 12/13/22  0725 12/12/22  0810 12/12/22  0554 12/11/22  0743 12/11/22  0657 12/10/22  1635 12/10/22  1310 12/10/22  1132 12/10/22  0616 09/11/22  1132 09/11/22  1044 09/11/22  0731 09/11/22  0727 09/02/22  2202 09/02/22  1656 02/24/21  1855 02/24/21  0442 02/23/21  2130 02/15/21  2127 02/15/21  1910 02/13/20  0219 02/13/20  0206   NA  --  135*  --   --   --   --  137  --  139  --  138  --  138  --  144   < > 140  --  142   < > 133*   < > 132*  --    < > 129*   < > 132*   POTASSIUM  --  4.1  --   --   --   --  3.9  --  3.8  --  3.7  --  4.5  4.5  --  3.3*   < > 4.9  --  3.6   < > 3.7   < > 4.1  --    < > 5.1   < > 4.1  4.2   CHLORIDE  --  96*  --   --   --   --  94*  --  98  --  101  --  103  --  105   < > 100  --  102   < > 95*   < > 99  --     < > 97   < > 96   CO2  --  29  --   --   --   --  31*  --  28  --  26  --  22  --  26   < > 29  --  29   < > 26   < > 25  --    < > 16*   < > 22   ANIONGAP  --  10  --   --   --   --  12  --  13  --  11  --  13  --  13   < > 11  --  11   < > 12   < > 8  --    < > 17*   < > 14   BUN  --  30.4*  --   --   --   --  26.3*  --  27.8*  --  30.3*  --  36.0*  --  32.1*   < > 50.5*  --  49.7*   < > 48.8*   < > 44*  --    < > 78*   < > 34*   CR  --  1.46*  --   --   --   --  1.41*  --  1.44*  --  1.38*  --  1.61*  --  1.47*   < > 1.73*  --  1.59*   < > 2.00*   < > 3.84*  --    < > 3.23*   < > 1.58*   GFRESTIMATED  --  52*  --   --   --   --  54*  --  53*  --  55*  --  46*  --  51*   < > 42*  --  47*   < > 35*   < > 15*  --    < > 19*   < > 45*   * 104* 196* 156*  --  104* 103*   < > 98   < > 93   < > 292*   < > 101*   < > 181*   < > 129*   < > 175*   < > 125*  --    < > 215*   < > 154*   A1C  --   --   --   --   --   --   --   --   --   --   --   --   --   --   --   --   --   --   --   --  6.3*  --   --   --   --   --    < >  --    CALOS  --  8.4*  --   --   --   --  8.7*  --  8.1*  --  8.6*  --  8.4*  --  8.1*   < > 9.3  --  8.8   < > 8.4*   < > 8.0*  --    < > 8.7   < > 8.9   PHOS  --   --   --   --   --   --   --   --   --   --   --   --   --   --   --   --  3.7  --  3.9   < >  --   --   --   --    < > 5.0*   < > 5.4*   MAG  --  2.0  --   --  1.8  --   --    < >  --    < >  --   --  1.9  --   --    < > 2.2  --  2.2   < > 1.9   < >  --   --    < > 2.1   < > 2.0   LACT  --   --   --   --   --   --   --   --   --   --   --   --   --   --   --   --   --   --   --   --   --   --  0.7 1.4   < > 6.9*   < > 9.5*   CKT  --   --   --   --   --   --   --   --   --   --   --   --   --   --   --   --   --   --   --   --   --   --   --   --   --  884*  --  39    < > = values in this interval not displayed.       Hepatic Studies    Recent Labs   Lab Test 12/14/22  0651 12/13/22  0725 12/12/22  0554 12/11/22  0657 12/10/22  6290  12/10/22  0616 12/09/22  1336 12/08/22  1256 10/18/22  1250   BILITOTAL 0.5 0.5 0.4 0.5  --  0.6 0.7   < > 0.5   ALKPHOS 272* 306* 282* 299*  --  268* 299*   < > 175*   ALBUMIN 3.3* 3.3* 3.1* 3.2*  --  3.1* 3.3*   < > 4.0   * 143* 168* 151*  --  131* 151*   < > 93*   ALT 55* 76* 124* 128*  --  108* 119*   < > 88*   LDH  --   --   --   --  364*  --   --   --  283*    < > = values in this interval not displayed.       Pancreatitis testing    Recent Labs   Lab Test 09/03/22  0630 02/08/20  0408   TRIG 53 57       Hematology Studies      Recent Labs   Lab Test 12/14/22  0651 12/13/22  0725 12/12/22  1244 12/12/22  0554 12/11/22  0657 12/10/22  0616 12/09/22  0502 03/09/21  0510 03/07/21  0543 03/06/21  0430 03/05/21  0400 03/04/21  0405 03/03/21  0455 03/02/21  0401   WBC 9.9 9.1  --  7.6 6.9 6.4 7.5   < > 4.5 4.4 5.4 5.6 7.0 7.3   ANEU  --   --   --   --   --   --   --   --  2.6 2.8 3.6 3.8 5.3 5.6   ALYM  --   --   --   --   --   --   --   --  0.9 0.7* 0.8 0.7* 0.6* 0.5*   GIULIANO  --   --   --   --   --   --   --   --  0.7 0.6 0.7 0.8 0.9 0.9   AEOS  --   --   --   --   --   --   --   --  0.3 0.2 0.2 0.1 0.1 0.1   HGB 12.5* 13.3 13.2* 12.9* 13.5 12.8* 12.8*   < > 7.9* 8.0* 7.7* 8.2* 8.1* 9.2*   HCT 39.4* 42.5  --  40.8 43.3 40.4 39.2*   < > 25.9* 25.3* 24.8* 26.2* 25.4* 29.2*    278  --  243 245 244 251   < > 208 196 210 224 236 218    < > = values in this interval not displayed.       Arterial Blood Gas Testing    Recent Labs   Lab Test 09/03/22  0630 03/11/21  1330 02/25/21  0915 02/24/21  0442 02/22/21  1539 02/22/21  0353 02/21/21  0854 02/21/21  0335 02/20/21  1544 02/20/21  1303 02/20/21  1037 02/20/21  0912 02/20/21  0345   PH  --   --   --   --   --  7.46*  --  7.43  --  7.38 7.41  --  7.45   PCO2  --   --   --   --   --  34*  --  37  --  43 38  --  36   PO2  --   --   --   --   --  70*  --  63*  --  125* 107*  --  124*   HCO3  --   --   --   --   --  24  --  25  --  25 24  --  25   O2PER 0 21 21  21.0   < > 21.0  21.0   < > 21.0  21.0   < > 40 40   < > 40.0  40.0    < > = values in this interval not displayed.        Urine Studies     Recent Labs   Lab Test 02/16/21  0225 11/17/20  0825 02/22/20  0944 02/13/20  0334 02/07/20 2029   URINEPH 5.5 5.5 5.5 6.0 5.0   NITRITE Negative Negative Negative Negative Negative   LEUKEST Negative Negative Small* Small* Negative   WBCU 0 0 2 81* 3       Vancomycin Levels     Recent Labs   Lab Test 12/10/22  1948 02/16/20  0400 02/14/20  0331   VANCOMYCIN 11.3 18.5 16.2       Microbiology:  Culture   Date Value Ref Range Status   12/11/2022 No anaerobic organisms isolated after 3 days  Preliminary   12/11/2022 No growth after 3 days  Preliminary   12/11/2022 1+ Staphylococcus aureus (A)  Final     Comment:     Susceptibilities done on previous cultures   12/11/2022 1+ Staphylococcus aureus (A)  Final     Comment:     Susceptibilities done on previous cultures   12/08/2022 No Growth  Final   12/08/2022 2+ Staphylococcus aureus (A)  Final   12/08/2022 2+ Staphylococcus aureus (A)  Final   12/08/2022 No Growth  Final   10/18/2022 2+ Staphylococcus aureus (A)  Final   10/18/2022 2+ Staphylococcus aureus (A)  Final   09/20/2022 2+ Staphylococcus aureus (A)  Final   09/20/2022 1+ Normal freeman  Final   09/20/2022 No anaerobic organisms isolated  Final   02/10/2022 1+ Staphylococcus aureus (A)  Final   02/10/2022 1+ Cutibacterium (Propionibacterium) acnes (A)  Final     Comment:     Susceptibility testing of Cutibacterium (Propionibacterium) species is not done from this source. This organism is susceptible to penicillin and cefotaxime and most are susceptible to clindamycin.   08/17/2021 No Growth  Final   08/17/2021 No Growth  Final   08/17/2021 1+ Staphylococcus aureus (A)  Final   08/17/2021 No anaerobic organisms isolated  Final       Last check of C difficile  C Diff Toxin B PCR   Date Value Ref Range Status   11/15/2020 Negative NEG^Negative Final     Comment:      Negative: C. difficile target DNA sequences NOT detected, presumed negative   for C.difficile toxin B or the number of bacteria present may be below the   limit of detection for the test.  FDA approved assay performed using [employer] GeneXpert real-time PCR.  A negative result does not exclude actual disease due to C. difficile and may   be due to improper collection, handling and storage of the specimen or the   number of organisms in the specimen is below the detection limit of the assay.         Imaging:  CT C/A/P 12/8/22  IMPRESSION:   1. LVAD drive line with surrounding fluid in the along the tract in  the subcutaneous right anterior chest wall increased from previous.  Trace fluid surrounding the LVAD outflow tract , similar to prior. No  organized fluid collection or significant associated inflammatory  changes.  2. Left inguinal lymphadenopathy and stable prominent mediastinal  lymph nodes, likely reactive  3. Cholelithiasis without evidence of cholecystitis. Mild intrahepatic  biliary ductal dilation.  4. Trace ascites     US Abd limited 12/8/22  IMPRESSION:   1.  Normal grayscale and Doppler evaluation of the liver, as well as  portal and hepatic vasculature.  2.  Decompressed gallbladder without evidence of acute cholecystitis.  No evidence of cholestatic disease process. There is a 3 mm  gallbladder cholesterol polyp which does not require follow-up.     ECHO  TTE 12/10/22  Interpretation Summary  HM3 LVAD at 5800 rpm.  Left ventricular function is decreased. The ejection fraction is 10-15%  (severely reduced). Severe diffuse hypokinesis is present. The LVAD inflow cannula is seen in the apex. It is not approximated to the septum. The interventricular septum is deviated slightly towards the LV. The inflow  cannula velocities are somewhat low (40 cm/s) but outflow cannula velocities are normal (120 cm/s).  Moderate right ventricular dilation is present. Global right ventricular  function is moderately  reduced.  Mild to moderate mitral and tricuspid insufficiency are present.  The AV appears to open with each beat. There is mild-moderate aortic  regurgitation.  The estimated PA systolic pressure is at least 42 mmHg.  IVC diameter >2.1 cm collapsing <50% with sniff suggests a high RA pressure estimated at 15 mmHg or greater.  This study was compared with the study from 09/08/2022. No significant changes noted.

## 2022-12-14 NOTE — PLAN OF CARE
D: Pt admitted 12/8 for LVAD (2/18/20) drive line infection. PMH of heart failure secondary to NICM, recurrent drive line infections, CAD, ABHINAV, HTN, CKD, ANA.     I: Monitored vitals and assessed pt status. Performed interventions and care as documented.     A: Pt reports feeling well. Tele - paced rhythm. On room air. Wears CPAP overnight. Independent with care.     HM3 LVAD - no alarms. Bleeding from wound continues. Dressing saturated and changed by overnight resident after midnight. Dressing remains intact at this time. Some shadowing beginning to show at distal portion of dressing - reinforced.     P: Continue to monitor pt status and report changes to care team.

## 2022-12-14 NOTE — PROCEDURES
The patient's HeartMate LVAD was interrogated 12/14/2022  * Speed 5800 rpm   * Pulsatility index 2.4-4.9  * Power 4.7 Driver   * Flow 4.6-5.1 L/minute   Fluid status: euvolemic   Alarms were reviewed, and notable for no PI events  The driveline exit site was inspected, pressures dressing cdi.   All external components were inspected and showed no evidence of damage or malfunction, none replaced.   No changes to VAD settings made

## 2022-12-14 NOTE — PROGRESS NOTES
[facility]   Cardiology II Service / Advanced Heart Failure  Daily Progress Note      Patient: Eliseo Tanner  MRN: [id number]  Admission Date: 12/8/2022  Hospital Day # 6    Assessment and Plan: Eliseo Tanner is a 67 year old male with chronic systolic heart failure secondary to NICM (LVEF 15-20% per TTE 9/2022), s/p HM3 LVAD (2/18/2020, DT) c/b recurrent drive-line infections, moderate CAD, ABHINAV (on CPAP), DMII, HTN, CKD-III, and ANA who was admitted for an acute heart failure exacerbation and concerns for recurrent drive-line infection.    Today's Plan:  -decrease Bumex 4 mg tid  -CVTS to manage wound bleeding, will discuss holding warfarin  -discharge when bleeding improves, wound vac placed  -plan is for IV abx q8hr at local clinic and hospital    # Recurrent driveline infections, acute on chronic  # s/p abdominal wound I&D 12/11/22  H/o recurrent MSSA infections. on IV cefazolin 10/21-11/18 due to increased driveline drainage with +MSSA, which improved but did not completely resolve.  He was transitioned to po bactrim 11/19, but then developed increased purulent drainage 1 week later, prompting this admission.  Would cultures from OSH are growing S. Aureus, would cultures from admission also growing S. Aureus. CT abdomen revealed fluid along driveline tract, increased from prior, but no organized abscess. S/p I&D per CVTS on 12/11.  - ID and CVTS consulted, appreciate recs  - stopped andres and zosyn 12/9  - stopped vanco 12/11 and started cefazolin 2g IV q8 hours per ID  - NOTE: he does not have insurance coverage for home antibiotics, historically has been able to getinfusions at his local clinic (and ER on the weekends/holidays).  He would prefer to do this rather than SNF.  - ongoing bleeding/wound drainage requiring frequent packing, monitor Hgb daily for now    # Chronic systolic heart failure/HFrEF secondary to NICM   # s/p HM3 LVAD as DT on 2/2020   Stage D. NYHA Class II-III   RHC  12/12/22: RA 9, mPA 22, PCW 13    - Fluid status: euvolemic, decrease Bumex 4 mg tid (pta 60 mg bid) today given rising BUN and ongoig weight loss  - ACEi/ARB/ARNi: deferred, Entresto caused BERNARDA per notes  - Afterload reduction: hydralazine 100 mg tid and Imdur 90 mg daily  - nBB contraindicated due to bradycardia and RV failure per prior notes  - Aldosterone antagonist deferred  - SGLT2i: Farxiga 5 mg daily - consider increase to 10 mg daily   - SCD prophylaxis ICD  - Antiplatelet:  Aspirin 81mg daily  - Anticoagulation:  warfarin per pharmacy goal INR 1.8-2.5.  INR today 1.8. consider holding for ongoing bleeding  - MAPs: 80s  - LDH:  364 (12/10)  - Remote monitoring: CardioMEMs, has home pillow    # HTN  - continue hydral, imdur, plus amlodipine 10mg daily.     # Hypokalemia, diuretic induced  - replete K per protocol  - continue to monitor     # Hepatic injury, likely cholestatic, R factor 1  Found to have elevated , , Alk Phos 330. Likely predominantly cholestatic given R factor 1. Possibly related to congestion. CT with chloeithiasis without cholecysitits.   - PTA atorvastatin has been held, resume pending LFTs  - LFTs continue to downtrend with diuresis  - RUQ ultrasound if not improving with diuresis      # CKD III  Baseline Cr 1.6-1.9 GFR low 40s.   - Cr remains stable/below baseline     # Atrial fibrillation  # Slow VT  [id number] 4. History of afib w/ RVR and aberrancy vs. NATHALIA vs. AT vs. Slow VT.  S/p DCCV on 2/28/22, back into sinus rhythm.  - continue pta amiodarone 200mg daily     # CAD  Mild to moderate CAD with severe branch disease per angiogram 11/2019.   - continue ASA, statin as above  - BB deferred as above     # Hypothyroidism   Last TSH 5.16 on 10/18/22, 6.11 with FT4 of 1.19 this admission. On amiodarone as above   - continue levothyroxine 50mcg daily     # DM II  A1c 6.3 on 9/2/22.  - continue dapagliflozin  - Low-intensity sliding scale     # Insomnia - pta zolpidem  #  BPH, urinary retention - cont pta finasteride, tamsulosin   # GERD - cont PTA PPI  # Gout - pta allopurinol discontinued in the past   # IgG Kappa monoclonal gammopathy of undetermined significance - Followed by heme/onc outpt     FEN: 2g Na 2L FR  PROPHY:  Therapeutic INR, PPI  LINES:  PIV   DISPO:  Pending abx plan and wound stability  CODE STATUS:  Full code    Siena Cosme, DNP, NP-C  Advanced Heart Failure/Cardiology II Service  Pager [phone] [facility] [id number]    ================================================================    Subjective/24-Hr Events:   Last 24 hr care team notes reviewed. More bleeding overnight requiring packing from CVTS. No other complaints. Good urine output and weight loss .     ROS:  4 point ROS including respiratory, CV, GI and  (other than that noted in the HPI) is negative.     Medications: Reviewed in EPIC.     Physical Exam:   BP 97/80 (BP Location: Right arm)   Pulse 60   Temp 98.2  F (36.8  C) (Oral)   Resp 16   Wt 80.1 kg (176 lb 8 oz)   SpO2 97%   BMI 27.16 kg/m      GENERAL: Appears comfortable, in no distress.  HEENT: Eye symmetrical, no discharge or icterus bilaterally. Mucous membranes moist and without lesions.  NECK: Supple, JVD at clavicle at 90 degrees.   CV: +mechanical LVAD hum.   RESPIRATORY: Respirations regular, even, and unlabored. Lungs CTA throughout.    GI: Soft and non distended with normoactive bowel sounds present in all quadrants. No tenderness, rebound, guarding.   EXTREMITIES: No peripheral edema. 2+ bilateral pedal pulses.   NEUROLOGIC: Alert and oriented x 3. No focal deficits.   MUSCULOSKELETAL: No joint swelling or tenderness.   SKIN: No jaundice. No rashes or lesions.     Labs:  CMP  Recent Labs   Lab 12/14/22  0736 12/14/22  0651 12/13/22  2208 12/13/22  1627 12/13/22  1621 12/13/22  1102 12/13/22  0725 12/12/22  1629 12/12/22  1624 12/12/22  0810 12/12/22  0554 12/11/22  1634 12/11/22  1537 12/11/22  0743 12/11/22  0657   NA  --  135*  --    --   --   --  137  --   --   --  139  --   --   --  138   POTASSIUM  --  4.1  --   --   --   --  3.9  --   --   --  3.8  --   --   --  3.7   CHLORIDE  --  96*  --   --   --   --  94*  --   --   --  98  --   --   --  101   CO2  --  29  --   --   --   --  31*  --   --   --  28  --   --   --  26   ANIONGAP  --  10  --   --   --   --  12  --   --   --  13  --   --   --  11   * 104* 196* 156*  --    < > 103*   < >  --    < > 98   < >  --    < > 93   BUN  --  30.4*  --   --   --   --  26.3*  --   --   --  27.8*  --   --   --  30.3*   CR  --  1.46*  --   --   --   --  1.41*  --   --   --  1.44*  --   --   --  1.38*   GFRESTIMATED  --  52*  --   --   --   --  54*  --   --   --  53*  --   --   --  55*   CALOS  --  8.4*  --   --   --   --  8.7*  --   --   --  8.1*  --   --   --  8.6*   MAG  --  2.0  --   --  1.8  --   --   --  1.7  --   --   --  1.8  --   --    PROTTOTAL  --  7.1  --   --   --   --  7.5  --   --   --  6.8  --   --   --  7.2   ALBUMIN  --  3.3*  --   --   --   --  3.3*  --   --   --  3.1*  --   --   --  3.2*   BILITOTAL  --  0.5  --   --   --   --  0.5  --   --   --  0.4  --   --   --  0.5   ALKPHOS  --  272*  --   --   --   --  306*  --   --   --  282*  --   --   --  299*   AST  --  118*  --   --   --   --  143*  --   --   --  168*  --   --   --  151*   ALT  --  55*  --   --   --   --  76*  --   --   --  124*  --   --   --  128*    < > = values in this interval not displayed.       CBC  Recent Labs   Lab 12/14/22  0651 12/13/22  0725 12/12/22  1244 12/12/22  0554 12/11/22  0657   WBC 9.9 9.1  --  7.6 6.9   RBC 4.17* 4.43  --  4.26* 4.50   HGB 12.5* 13.3 13.2* 12.9* 13.5   HCT 39.4* 42.5  --  40.8 43.3   MCV 95 96  --  96 96   MCH 30.0 30.0  --  30.3 30.0   MCHC 31.7 31.3*  --  31.6 31.2*   RDW 15.9* 16.3*  --  16.6* 16.6*    278  --  243 245       INR  Recent Labs   Lab 12/14/22  0651 12/13/22  0725 12/12/22  0554 12/11/22  0657   INR 1.86* 2.07* 2.41* 2.28*       Time/Communication  I personally  spent a total of 25 minutes. Of that 15 minutes was counseling/coordination of patient's care. Plan of care discussed with patient. See my note above for details.    Patient discussed with Dr. Tomas.

## 2022-12-14 NOTE — PROGRESS NOTES
Care Management Follow Up    Length of Stay (days): 6    Expected Discharge Date: 12/16/2022     Concerns to be Addressed: Discharge planning  Patient plan of care discussed at interdisciplinary rounds: Yes     Anticipated Discharge Disposition: Home     Anticipated Discharge Services: Outpatient infusion, Outpatient wound cares  Anticipated Discharge DME: Wound vac     Education Provided on the Discharge Plan: Yes  Patient/Family in Agreement with the Plan: Yes     Additional Information:  Pt still bleeding from driveline I&D site, unable to place wound vac at this time.     [facility] (Pharmacist for scheduling- can provide q8hr infusions and wound vac changes)  Ph: [phone]  Fax: [phone]  NP inquired if pt can discharge with midline for outpatient infusion. This writer called and confirmed with jayden Vegas that they are ok with midline.    CC will continue to monitor patient's medical condition and progress towards discharge.  Carmita Farnsworth RN BSN  6C Unit Care Coordinator  Phone number: [phone]  Pager: [phone]

## 2022-12-15 NOTE — PLAN OF CARE
D: 12/8 for HF exacerbation and concerns for recurrent drive line infections    I: Monitored vitals and assessed pt status.   Changed: reinforced dressing x4  Running: TKO for antibiotics  PRN:     A: A/o x4, VSS. V-paced in the 60's, doppler MAP's 70-88 overnight. RA, home CPAP overnight. Urinal at bedside w/ adequate urine output. Driveline dressing reinforced x4. Provider notified. Verbal order to keep reinforcing they remove quickclot on 12/15. Hg down 2.3u in 24 hours, Hematocrit down 7% in 24 hours. Provider notified when labs came back in AM by night shift nurse around 0600. Type and screen done yesterday if possible procedure done today. Consent is signed on online chart. Pt has been NPO since midnight for possible procedure in AM. PIV x2. SBA. ACHS, low fat, low sodium diet. Given 1u of plasma at beginning of shift.     Vitals: BP (!) 84/71 (BP Location: Left arm)   Pulse 60   Temp 98.6  F (37  C) (Oral)   Resp 16   Wt 80.4 kg (177 lb 4.8 oz)   SpO2 96%   BMI 27.28 kg/m       P: Continue to monitor Pt status and report any significant changes to treatment team

## 2022-12-15 NOTE — PROGRESS NOTES
"Paged provider via [facility] web [phone]    \"[phone] Maritza RODRIGUEZ  FYI driveline site dressing this pm soaked w/blood, reinforced w/ ABD. Awaiting 1 unit of plasma transfusion. VS remain stable. Hbg dropped 1 unit since am, 2 units since 12/13.   Maritza RODRIGUEZ [phone]\"  "

## 2022-12-15 NOTE — PROGRESS NOTES
[facility]   Cardiology II Service / Advanced Heart Failure  Daily Progress Note      Patient: Eliseo Tanner  MRN: [id number]  Admission Date: 12/8/2022  Hospital Day # 7    Assessment and Plan: Eliseo Tanner is a 67 year old male with chronic systolic heart failure secondary to NICM (LVEF 15-20% per TTE 9/2022), s/p HM3 LVAD (2/18/2020, DT) c/b recurrent drive-line infections, moderate CAD, ABHINAV (on CPAP), DMII, HTN, CKD-III, and ANA who was admitted for an acute heart failure exacerbation and concerns for recurrent drive-line infection.    Today's Plan:  -Bumex 4 mg tid, switch back to pta regimen tomorrow  -hold warfarin again today  -q8hr hgb, pediatric tubes for blood draws  -midline IV today  -discharge when bleeding improves, wound vac placed ?this weekend    # Recurrent driveline infections, acute on chronic  # s/p abdominal wound I&D 12/11/22  # Acute blood loss anemia 2/2 above  H/o recurrent MSSA infections. on IV cefazolin 10/21-11/18 due to increased driveline drainage with +MSSA, which improved but did not completely resolve.  He was transitioned to po bactrim 11/19, but then developed increased purulent drainage 1 week later, prompting this admission.  Would cultures from OSH are growing S. Aureus, would cultures from admission also growing S. Aureus. CT abdomen revealed fluid along driveline tract, increased from prior, but no organized abscess. S/p I&D per CVTS on 12/11.  - ID and CVTS consulted, appreciate recs  - stopped andres and zosyn 12/9  - stopped vanco 12/11 and started cefazolin 2g IV q8 hours per ID  - NOTE: he does not have insurance coverage for home antibiotics, historically has been able to getinfusions at his local clinic (and ER on the weekends/holidays).  He would prefer to do this rather than SNF.  - ongoing bleeding/wound drainage requiring frequent packing   - [id number] given drop in Hgb 10.3 <- 12.5   - stitch placed today by CVTS, reassess tomorrow   - s/p 1 unit  FFP 12/14   - holding warfarin and asa for now, INR 1.6 today    # Chronic systolic heart failure/HFrEF secondary to NICM   # s/p HM3 LVAD as DT on 2/2020   Stage D. NYHA Class II-III   RHC 12/12/22: RA 9, mPA 22, PCW 13    - Fluid status: euvolemic, Bumex 4 mg tid (pta 60 mg bid) - switch back to that regimen tomorrow  - ACEi/ARB/ARNi: deferred, Entresto caused BERNARDA per notes  - Afterload reduction: hydralazine 100 mg tid and Imdur 90 mg daily  - nBB contraindicated due to bradycardia and RV failure per prior notes  - Aldosterone antagonist deferred  - SGLT2i: Farxiga 5 mg daily - consider increase to 10 mg daily   - SCD prophylaxis ICD  - Antiplatelet:  aspirin 81mg daily on hold for bleeding  - Anticoagulation:  warfarin per pharmacy goal INR 1.8-2.5. INR today 1.6, warfarin held since 12/13, s/p 1 unit FFP 12/14  - MAPs: 78-88  - LDH:  364 (12/10)  - Remote monitoring: CardioMEMs, has home pillow    # HTN  - continue hydral, imdur, plus amlodipine 10mg daily.     # Hypokalemia, diuretic induced  - replete K per protocol  - continue to monitor     # Hepatic injury, likely cholestatic, R factor 1  Found to have elevated , , Alk Phos 330. Likely predominantly cholestatic given R factor 1. Possibly related to congestion. CT with chloeithiasis without cholecysitits.   - PTA atorvastatin has been held, resume pending LFTs  - LFTs continue to downtrend with diuresis  - RUQ ultrasound if not improving with diuresis      # CKD III  Baseline Cr 1.6-1.9 GFR low 40s.   - Cr remains stable/below baseline     # Atrial fibrillation  # Slow VT  [id number] 4. History of afib w/ RVR and aberrancy vs. FPC vs. AT vs. Slow VT.  S/p DCCV on 2/28/22, back into sinus rhythm.  - continue pta amiodarone 200mg daily     # CAD  Mild to moderate CAD with severe branch disease per angiogram 11/2019.   - continue ASA, statin as above  - BB deferred as above     # Hypothyroidism   Last TSH 5.16 on 10/18/22, 6.11 with FT4 of 1.19  this admission. On amiodarone as above   - continue levothyroxine 50mcg daily     # DM II  A1c 6.3 on 9/2/22.  - continue dapagliflozin  - Low-intensity sliding scale     # Insomnia - pta zolpidem  # BPH, urinary retention - pta finasteride, tamsulosin   # GERD - pta PPI  # Gout - no recent flares, allopurinol discontinued in the past   # IgG Kappa monoclonal gammopathy of undetermined significance - followed by heme/onc outpt     FEN: 2g Na 2L FR  PROPHY:  Therapeutic INR, PPI  LINES:  PIV   DISPO:  Pending abx plan and wound stability  CODE STATUS:  Full code    Siena Aguiar, CORY, NP-C  Advanced Heart Failure/Cardiology II Service  Pager [phone] [facility] [id number]    ================================================================    Subjective/24-Hr Events:   Last 24 hr care team notes reviewed. Dressing reinforcd four times overnight. Hgb 10s today from 12. No lightheadedness or dizziness, MAP is stable/wnl. No other concerns today. Occasional PI events    ROS:  4 point ROS including respiratory, CV, GI and  (other than that noted in the HPI) is negative.     Medications: Reviewed in EPIC.     Physical Exam:   BP (!) 84/71 (BP Location: Left arm)   Pulse 60   Temp 98.6  F (37  C) (Oral)   Resp 16   Wt 80.4 kg (177 lb 4.8 oz)   SpO2 96%   BMI 27.28 kg/m      GENERAL: Appears comfortable, in no distress.  HEENT: Eye symmetrical, no discharge or icterus bilaterally. Mucous membranes moist and without lesions.  NECK: Supple, JVD below clavicle at 90 degrees.   CV: +mechanical LVAD hum.   RESPIRATORY: Respirations regular, even, and unlabored. Lungs CTA throughout.    GI: Soft and non distended with normoactive bowel sounds present in all quadrants. No tenderness, rebound, guarding.   EXTREMITIES: No peripheral edema. 2+ bilateral pedal pulses.   NEUROLOGIC: Alert and oriented x 3. No focal deficits.   MUSCULOSKELETAL: No joint swelling or tenderness.   SKIN: No jaundice. No rashes or lesions.      Labs:  [facility]  Recent Labs   Lab 12/15/22  0437 12/14/22  2140 12/14/22  1651 12/14/22  1544 12/14/22  1141 12/14/22  0736 12/14/22  0651 12/13/22  1627 12/13/22  1621 12/13/22  1102 12/13/22  0725 12/12/22  1629 12/12/22  1624 12/12/22  0810 12/12/22  0554     --   --   --   --   --  135*  --   --   --  137  --   --   --  139   POTASSIUM 3.8  --   --   --   --   --  4.1  --   --   --  3.9  --   --   --  3.8   CHLORIDE 98  --   --   --   --   --  96*  --   --   --  94*  --   --   --  98   CO2 30*  --   --   --   --   --  29  --   --   --  31*  --   --   --  28   ANIONGAP 8  --   --   --   --   --  10  --   --   --  12  --   --   --  13   * 124* 231*  --  125*   < > 104*   < >  --    < > 103*   < >  --    < > 98   BUN 27.7*  --   --   --   --   --  30.4*  --   --   --  26.3*  --   --   --  27.8*   CR 1.39*  --   --   --   --   --  1.46*  --   --   --  1.41*  --   --   --  1.44*   GFRESTIMATED 55*  --   --   --   --   --  52*  --   --   --  54*  --   --   --  53*   CALOS 7.8*  --   --   --   --   --  8.4*  --   --   --  8.7*  --   --   --  8.1*   MAG  --   --   --  1.8  --   --  2.0  --  1.8  --   --   --  1.7  --   --    PROTTOTAL 6.2*  --   --   --   --   --  7.1  --   --   --  7.5  --   --   --  6.8   ALBUMIN 2.8*  --   --   --   --   --  3.3*  --   --   --  3.3*  --   --   --  3.1*   BILITOTAL 0.4  --   --   --   --   --  0.5  --   --   --  0.5  --   --   --  0.4   ALKPHOS 234*  --   --   --   --   --  272*  --   --   --  306*  --   --   --  282*   AST 96*  --   --   --   --   --  118*  --   --   --  143*  --   --   --  168*   ALT 34  --   --   --   --   --  55*  --   --   --  76*  --   --   --  124*    < > = values in this interval not displayed.       CBC  Recent Labs   Lab 12/15/22  0437 12/14/22  1717 12/14/22  0651 12/13/22  0725 12/12/22  1244 12/12/22  0554   WBC 8.2  --  9.9 9.1  --  7.6   RBC 3.45*  --  4.17* 4.43  --  4.26*   HGB 10.2* 11.6* 12.5* 13.3   < > 12.9*   HCT 32.3*  --  39.4* 42.5   --  40.8   MCV 94  --  95 96  --  96   MCH 29.6  --  30.0 30.0  --  30.3   MCHC 31.6  --  31.7 31.3*  --  31.6   RDW 15.9*  --  15.9* 16.3*  --  16.6*     --  286 278  --  243    < > = values in this interval not displayed.       INR  Recent Labs   Lab 12/15/22  0437 12/14/22  0651 12/13/22  0725 12/12/22  0554   INR 1.65* 1.86* 2.07* 2.41*       Time/Communication  I personally spent a total of 25 minutes. Of that 15 minutes was counseling/coordination of patient's care. Plan of care discussed with patient. See my note above for details.    Patient discussed with Dr. Tomas.

## 2022-12-15 NOTE — PROCEDURES
The patient's HeartMate LVAD was interrogated 12/15/2022  * Speed 5800 rpm   * Pulsatility index 1.9-3  * Power 4.7 Driver   * Flow 5.1 L/minute   Fluid status: euvolemic   Alarms were reviewed, and notable for few PI events overnight.  The driveline exit site was inspected, pressures dressing cdi.   All external components were inspected and showed no evidence of damage or malfunction, none replaced.   No changes to VAD settings made

## 2022-12-15 NOTE — PROGRESS NOTES
Cardiovascular Surgery Progress Note  12/15/2022         Assessment and Plan:     Eliseo Tanner is a 67 year old male with chronic systolic heart failure secondary to NICM (LVEF 15-20% per TTE 9/2022), s/p HM3 LVAD (2/18/2020, DT) c/b recurrent drive-line infections, moderate CAD, ABHINAV (on CPAP), DMII, HTN, CKD-III, and ANA who was admitted for an acute heart failure exacerbation and concerns for recurrent drive-line infection.  S/P I&D of driveline site on 12/11/22 with Dr Mac Jaramillo.      - Having persistent bleeding from wound bed in multiple places. Recommend holding Coumadin for 1 more day (12/15) to aid in clot formation.  - 1 unit Plasma 12/14 for continued bleeding with INR 1.86.  INR 1.65 today.   - QuickClot gauze in place 12/15 am with pressure dressing. OK to change dressing 12/15 pm shift, replace quickclot gauze and kerlix roll for pressure dressing.   - Wait for Vac dressing until bleeding has stopped.     Discussed with Dr Jaramillo through written and verbal communication.      Diego Zuñiag PA-C  Cardiothoracic Surgery  Pager [phone]    8:14 AM   December 15, 2022        Interval History:     No overnight events, still saturating gauze dressings and reinforced dressings.         Physical Exam:   Blood pressure (!) 84/71, pulse 60, temperature 98.6  F (37  C), temperature source Oral, resp. rate 16, weight 80.4 kg (177 lb 4.8 oz), SpO2 96 %.  Vitals:    12/13/22 0346 12/14/22 0446 12/15/22 0400   Weight: 81.9 kg (180 lb 9.6 oz) 80.1 kg (176 lb 8 oz) 80.4 kg (177 lb 4.8 oz)        Gen: A&Ox4, NAD  Neuro: no focal deficits   CV: VAD present, HD stable   Pulm: normal breathing on RA  Incision: Driveline incision approx 8 cm x 3 cm x 2 cm.  - consistent bleeding noted from skin edge at lateral apex, unable to slow bleeding with silver nitrate application, nylon suture placed and hemostasis was achieved.  - possible bleeding from soft tissue where driveline travels deep into tissue.  - scant  "bleeding from medial apex at various spots, silver nitrate stick applied.  - cleaned and repacked wound with Quickclot gauze and pressure dressing (2\" kerlix roll) applied.          Data:    Imaging:  reviewed recent imaging, no acute concerns    Labs:  BMP  Recent Labs   Lab 12/15/22  0437 12/14/22  2140 12/14/22  1651 12/14/22  1141 12/14/22  0736 12/14/22  0651 12/13/22  1102 12/13/22  0725 12/12/22  0810 12/12/22  0554     --   --   --   --  135*  --  137  --  139   POTASSIUM 3.8  --   --   --   --  4.1  --  3.9  --  3.8   CHLORIDE 98  --   --   --   --  96*  --  94*  --  98   CALOS 7.8*  --   --   --   --  8.4*  --  8.7*  --  8.1*   CO2 30*  --   --   --   --  29  --  31*  --  28   BUN 27.7*  --   --   --   --  30.4*  --  26.3*  --  27.8*   CR 1.39*  --   --   --   --  1.46*  --  1.41*  --  1.44*   * 124* 231* 125*   < > 104*   < > 103*   < > 98    < > = values in this interval not displayed.     CBC  Recent Labs   Lab 12/15/22  0437 12/14/22  1717 12/14/22  0651 12/13/22  0725 12/12/22  1244 12/12/22  0554   WBC 8.2  --  9.9 9.1  --  7.6   RBC 3.45*  --  4.17* 4.43  --  4.26*   HGB 10.2* 11.6* 12.5* 13.3   < > 12.9*   HCT 32.3*  --  39.4* 42.5  --  40.8   MCV 94  --  95 96  --  96   MCH 29.6  --  30.0 30.0  --  30.3   MCHC 31.6  --  31.7 31.3*  --  31.6   RDW 15.9*  --  15.9* 16.3*  --  16.6*     --  286 278  --  243    < > = values in this interval not displayed.     INR  Recent Labs   Lab 12/15/22  0437 12/14/22  0651 12/13/22  0725 12/12/22  0554   INR 1.65* 1.86* 2.07* 2.41*        "

## 2022-12-15 NOTE — PROGRESS NOTES
Care Coordinator  D/I: per CVTS rounds: Diego MATHIAS--still unable to place vac--hopeful pt can discharge on Sunday.  A: pt will need home wound vac  P: I called the pt on his room phone and explained my role and pt is very pleasant.  PCP: Jay Steele is listed in [place] but pt says he comes to [place] all the time and pt sees him there.  I verified all info on facesheet is correct.  Vac dressing changes will NOT be done by homecare but by an RN at the [facility] at [facility]---pt is aware that he will have to bring his supplies into them and I informed him the RN will help him order more supplies, or pt said his wife has been an EMT for 23 yrs and can help him. I explained it is his responsibility to send the vac back to [facility]/ when therapy is completed.  I informed pt--insurance will be verified and he may have copays.    Pt had DL Midline placed today for every 8 hour IVAB @ [facility]--need to call them with update on 12/16/22___         I obtained script for home vac from aSrah Jamison NP cvts and I have filled out [employer] on line order and fax'd script/H&P/ MD notes with wound info/I&D OR report at 4:05pm  Rental Order # [id number]  Will follow.

## 2022-12-15 NOTE — PROGRESS NOTES
"CLINICAL NUTRITION SERVICES    Reviewed nutrition risk factors due to LOS. Pt is tolerating diet and eating adequately per nursing documentation (eating % of meals with a good appetite). Per discussion with pt, reports good/adequate oral intake. He was eating lunch at time of visit. Reviewed wt hx: 90.7 kg (4/14/21), 100.9 kg (8/17/21), 97.4 kg (5/3/2022), 99.3 kg (6/24/2022), 80.9 kg (9/11/2022), 89 kg (10/18/2022), 80.4 kg (12/15/2022)- Diuresis this admission. Confirmed wt trend with pt during visit today. Per RD note 9/2022, \"Wt is down this admit (218 lbs on 9/2-->183 lbs on 9/9), but he was admitted for refractory hypervolemia and is undergoing diuresis.\"     Follow Up / Monitoring:   Will continue to follow pt via rounds.    Abby Woods, MS, RD, LD, [facility]   6C Pgr: [phone]    "

## 2022-12-15 NOTE — PROCEDURES
[facility]    Double Lumen Midline Placement    Date/Time: 12/15/2022 2:33 PM  Performed by: Nancy Ashraf RN  Authorized by: Jan Jiang MD   Indications: vascular access      UNIVERSAL PROTOCOL   Site Marked: Yes  Prior Images Obtained and Reviewed:  Yes  Required items: Required blood products, implants, devices and special equipment available    Patient identity confirmed:  Verbally with patient and arm band  NA - No sedation, light sedation, or local anesthesia  Confirmation Checklist:  Patient's identity using two indicators and relevant allergies  Time out: Immediately prior to the procedure a time out was called    Universal Protocol: the Joint Commission Universal Protocol was followed    Preparation: Patient was prepped and draped in usual sterile fashion       ANESTHESIA    Anesthesia: Local infiltration  Local Anesthetic:  Lidocaine 1% without epinephrine  Anesthetic Total (mL):  5      SEDATION    Patient Sedated: No        Preparation: skin prepped with ChloraPrep  Skin prep agent: skin prep agent completely dried prior to procedure  Sterile barriers: maximum sterile barriers were used: cap, mask, sterile gown, sterile gloves, and large sterile sheet  Hand hygiene: hand hygiene performed prior to central venous catheter insertion  Type of line used: Midline  Catheter type: double lumen  Lumen type: non-valved  Lumen Identification: Purple and Red  Catheter size: 5 Fr  Brand: Bard  Lot number: [id number]  Placement method: venipuncture, MST and ultrasound  Number of attempts: 1  Difficulty threading catheter: no  Successful placement: yes  Orientation: left    Location: basilic vein (vd 0.48 cm)  Tip Location: distal to axillary vein  Arm circumference: adults 10 cm  Extremity circumference: 26  Visible catheter length: 1  Total catheter length: 20  Dressing and securement: adhesive securement device, alcohol impregnated caps, blood  cleaned with CHG, chlorhexidine disc applied, dressing applied, glue, line secured, securement device, site cleansed, statlock, sterile dressing applied, transparent securement dressing, transparent dressing and tissue adhesive  Post procedure assessment: blood return through all ports and free fluid flow  PROCEDURE Describe Procedure: Midline ok to use

## 2022-12-15 NOTE — DISCHARGE INSTRUCTIONS
_________________________________________________________________________________________    Wound vac provided by [employer]/  24/7 customer service [phone]  Mr Tanner--instructions will come with the kit on how to return it to the company--DO NOT LET ANYONE TAKE THE VAC FROM YOU! It is your responsibility to return it.

## 2022-12-16 NOTE — PHARMACY-ANTICOAGULATION SERVICE
Clinical Pharmacy - Warfarin Dosing Consult     Pharmacy has been consulted to manage this patient s warfarin therapy.  Indication: LVAD/RVAD  Therapy Goal: Other - see comments (1.7-2.3) (Change in goal from previous admission goal  Warfarin Prior to Admission: Yes  Warfarin PTA Regimen: 4mg daily  Significant drug interactions: amiodarone    INR   Date Value Ref Range Status   12/16/2022 1.33 (H) 0.85 - 1.15 Final   12/15/2022 1.65 (H) 0.85 - 1.15 Final     Chromogenic Factor 10   Date Value Ref Range Status   02/23/2021 31 (L) 70 - 130 % Final     Comment:     Therapeutic Range:  A Chromogenic Factor 10 level of approximately 20-40%   inversely correlates with an INR of 2-3 for patients receiving Warfarin.   Chromogenic Factor 10 levels below 20% indicate an INR greater than 3 and   levels above 40% indicate an INR less than 2.       -- Warfarin held for the past two days to assist with bleed    Recommend warfarin 3 mg today tonight. Patient was on a home regimen of 4mg daily, which resulted in a therapeutic INR. Current admission doses have ranged from 2-3mg for a therapeutic INR. Although his warfarin has been held the last 2 days, he has also been bleeding so will dose a little conservative. Pharmacy will monitor Eliseo Tanner daily and order warfarin doses to achieve specified goal.      Please contact pharmacy as soon as possible if the warfarin needs to be held for a procedure or if the warfarin goals change.        Asad Terrazas, Pharm.D, BCPS

## 2022-12-16 NOTE — PLAN OF CARE
D: 12/8 for HF exacerbation and concerns for recurrent drive line infections     I: Monitored vitals and assessed pt status.   Changed:   Running: TKO for antibiotics  PRN:      A: A/o x4, VSS. V-paced in the 60's, pressures stable. RA, home CPAP overnight. Urinal at bedside w/ adequate urine output. Driveline dressing w/ no drainage overnight.  PIV x2. SBA. ACHS, low fat, low sodium diet.      Vitals: BP 90/81 (BP Location: Right arm, Cuff Size: Adult Regular)   Pulse 62   Temp 97.8  F (36.6  C) (Axillary)   Resp 16   Wt 79.6 kg (175 lb 8 oz)   SpO2 95%   BMI 27.00 kg/m        P: Continue to monitor Pt status and report any significant changes to treatment team. Await wound vac placement.

## 2022-12-16 NOTE — PLAN OF CARE
Neuro: A&Ox4. Pleasant and cooperative  Cardiac: Afebrile, VSS. Vpaced at 60. VAD numbers WNL, no alarms. MAPs 75-80   Respiratory: RA, denies SOB  GI/: Voiding spontaneously into urinal. Had BM this AM  Diet/appetite: Tolerating low Na diet. Denies nausea.  Activity: Up independently    Pain: Denies   Skin: scattered bruising, L elbow scab with mepilex in place, driveline I+D wound packed and dressed by CVTS provider today, monitor for bleeding; see orders to change if needed  Lines: R PIV, L midline  Drains:   Replacements: mag and K protocols; replaced Mag 1.9.     Warfarin to be resumed today. Q8hr Hbg checks. IV ancef Q8hrs. Will need wound vac placement once bleeding from driveline wound stops. Needs oupatient IV abx. Continue to monitor pt status and report changes to CVTS.    Problem: Ventricular Assist Device  Goal: Absence of Embolism Signs and Symptoms  Outcome: Progressing    Problem: Ventricular Assist Device  Goal: Absence of Bleeding  Outcome: Progressing     Goal: Absence of Infection Signs and Symptoms  Outcome: Progressing

## 2022-12-16 NOTE — PLAN OF CARE
Pt is a 67 year old male with chronic systolic heart failure secondary to NICM (LVEF 15-20% per TTE 9/2022), s/p HM3 LVAD (2/18/2020, DT) c/b recurrent drive-line infections, moderate CAD, ABHINAV (on CPAP), DMII, HTN, CKD-III, and ANA who was admitted for an acute heart failure exacerbation and concerns for recurrent drive-line infection.  S/P I&D of driveline site on 12/11/22 with Dr Mac Jaramillo.   Shift 15:00-23:30  Neuro: A&O x 4, pleasant.  CV: V-paced 60s. MAPs 80s. LVAD #s WNL.  GI/: Eating well, large urine output(on bumex po.) Mag 1.9 being replaced orally.On blood sugar checks.  Skin: Driveline dressing intact without bleeding, not changed.  Pain: Denies.  Plan: Plan is to change him to wound vac and discharge possibly this weekend. Midline placed for home on abx.

## 2022-12-16 NOTE — PROCEDURES
The patient's HeartMate LVAD was interrogated 12/16/2022  * Speed 5800 rpm   * Pulsatility index 2-3.4  * Power 4.8 Driver   * Flow 5 L/minute   Fluid status: euvolemic   Alarms were reviewed, and notable for few PI events.  The driveline exit site was inspected, pressures dressing cdi.   All external components were inspected and showed no evidence of damage or malfunction, none replaced.   No changes to VAD settings made

## 2022-12-16 NOTE — PROGRESS NOTES
[facility]   Cardiology II Service / Advanced Heart Failure  Daily Progress Note      Patient: Eliseo Tanner  MRN: [id number]  Admission Date: 12/8/2022  Hospital Day # 8    Assessment and Plan: Eliseo Tanner is a 67 year old male with chronic systolic heart failure secondary to NICM (LVEF 15-20% per TTE 9/2022), s/p HM3 LVAD (2/18/2020, DT) c/b recurrent drive-line infections, moderate CAD, ABHINAV (on CPAP), DMII, HTN, CKD-III, and ANA who was admitted for an acute heart failure exacerbation and concerns for recurrent drive-line infection.    Today's Plan:  -Bumex 5 mg bid  -ok to resume warfarin per CVTS  -q8hr hgb, pediatric tubes for blood draws, may need to transfuse prior to discharge  -resume lipitor     # Recurrent driveline infections, acute on chronic  # s/p abdominal wound I&D 12/11/22  # Acute blood loss anemia 2/2 above  H/o recurrent MSSA infections. on IV cefazolin 10/21-11/18 due to increased driveline drainage with +MSSA, which improved but did not completely resolve.  He was transitioned to po bactrim 11/19, but then developed increased purulent drainage 1 week later, prompting this admission.  Would cultures from OSH +S. Aureus, would cultures from admission also +S. Aureus. CT abdomen revealed fluid along driveline tract, increased from prior, but no organized abscess. S/p I&D per CVTS on 12/11.  - ID and CVTS consulted, appreciate recs  - stopped andres and zosyn 12/9  - stopped vanco 12/11 and started cefazolin 2g IV q8 hours per ID  - NOTE: he does not have insurance coverage for home antibiotics, historically has been able to getinfusions at his local clinic (and ER on the weekends/holidays).  He would prefer to do this rather than SNF.  - ongoing bleeding/wound drainage requiring frequent packing   - s/p 1 unit FFP 12/14   - stitch placed 12/15 by CVTS, bleeding slowed   - y4moSrk given drop in Hgb, may need to transfuse (Hgb 9.5 today from baseline of 13)   - ok to resume  warfarin today per CVTS, continue to hold aspirin    # Chronic systolic heart failure/HFrEF secondary to NICM   # s/p HM3 LVAD as DT on 2/2020   Stage D. NYHA Class II-III RHC 12/12/22: RA 9, mPA 22, PCW 13    - Fluid status: euvolemic, Bumex 5 mg bid (pta 60 mg bid, lower dose due to ongoing bleeding)  - ACEi/ARB/ARNi: deferred, Entresto caused BERNARDA per notes  - Afterload reduction: hydralazine 100 mg tid and Imdur 90 mg daily  - nBB contraindicated due to bradycardia and RV failure per prior notes  - Aldosterone antagonist deferred  - SGLT2i: Farxiga 5 mg daily - consider increase to 10 mg daily   - SCD prophylaxis ICD  - Antiplatelet:  aspirin 81mg daily on hold for bleeding  - Anticoagulation: warfarin per pharmacy goal INR 1.7-2.3 (per [facility] notes, notes say 1.8-2.5). INR today 1.3, warfarin held since 12/13, s/p 1 unit FFP 12/14, ok to resume  - MAPs: 64-83  - LDH:  364 (12/10)  - Remote monitoring: CardioMEMs, has home pillow    # HTN  - continue hydral, imdur, plus amlodipine 10mg daily.     # Hypokalemia, diuretic induced, resolved  - replete K per protocol  - continue to monitor     # Hepatic injury, likely cholestatic, R factor 1  Found to have elevated , , Alk Phos 330. Likely predominantly cholestatic given R factor 1. Possibly related to congestion. CT with chloeithiasis without cholecysitits.   - PTA atorvastatin has been held, resume today and monitor  - RUQ ultrasound if not improving with diuresis      # CKD III  Baseline Cr 1.6-1.9 GFR low 40s.   - Cr remains stable/below baseline     # Atrial fibrillation  # Slow VT  [id number] 4. History of afib w/ RVR and aberrancy vs. skilled nursing vs. AT vs. Slow VT.  S/p DCCV on 2/28/22, back into sinus rhythm.  - continue pta amiodarone 200mg daily     # CAD  Mild to moderate CAD with severe branch disease per angiogram 11/2019.   - continue ASA, statin as above  - BB deferred as above     # Hypothyroidism   Last TSH 5.16 on 10/18/22, 6.11 with FT4 of  1.19 this admission. On amiodarone as above   - continue levothyroxine 50mcg daily     # DM II  A1c 6.3 on 9/2/22.  - continue dapagliflozin  - Low-intensity sliding scale     # Insomnia - pta zolpidem  # BPH, urinary retention - pta finasteride, tamsulosin   # GERD - pta PPI  # Gout - no recent flares, allopurinol discontinued in the past   # IgG Kappa monoclonal gammopathy of undetermined significance - followed by heme/onc outpt     FEN: 2g Na 2L FR  PROPHY:  Therapeutic INR, PPI  LINES:  PIV   DISPO:  Pending Hgb and wound/bleeding stability  CODE STATUS:  Full code    Siena Aguiar, CORY, NP-C  Advanced Heart Failure/Cardiology II Service  Pager [phone] [facility] [id number]    ================================================================    Subjective/24-Hr Events:   Last 24 hr care team notes reviewed. No need for change in dressing overnight. Denies lightheadedness, MAP are stable. Denies LE edema, orthopnea, PND.     ROS:  4 point ROS including respiratory, CV, GI and  (other than that noted in the HPI) is negative.     Medications: Reviewed in EPIC.     Physical Exam:   BP (!) 84/56 (BP Location: Right arm)   Pulse 60   Temp 98  F (36.7  C) (Oral)   Resp 18   Wt 79.6 kg (175 lb 8 oz)   SpO2 97%   BMI 27.00 kg/m      GENERAL: Appears comfortable, in no distress.  HEENT: Eye symmetrical, no discharge or icterus bilaterally. Mucous membranes moist and without lesions.  NECK: Supple, JVD below clavicle at 90 degrees.   CV: +mechanical LVAD hum.   RESPIRATORY: Respirations regular, even, and unlabored. Lungs CTA throughout.    GI: Soft and non distended with normoactive bowel sounds present in all quadrants. No tenderness, rebound, guarding.   EXTREMITIES: No peripheral edema. 2+ bilateral pedal pulses.   NEUROLOGIC: Alert and oriented x 3. No focal deficits.   MUSCULOSKELETAL: No joint swelling or tenderness.   SKIN: No jaundice. No rashes or lesions.     Labs:  CMP  Recent Labs   Lab 12/16/22  0663  12/16/22  0536 12/15/22  2128 12/15/22  1637 12/15/22  1619 12/15/22  0837 12/15/22  0437 12/14/22  1651 12/14/22  1544 12/14/22  0736 12/14/22  0651 12/13/22  1102 12/13/22  0725   NA  --  139  --   --   --   --  136  --   --   --  135*  --  137   POTASSIUM  --  4.0  --   --   --   --  3.8  --   --   --  4.1  --  3.9   CHLORIDE  --  99  --   --   --   --  98  --   --   --  96*  --  94*   CO2  --  29  --   --   --   --  30*  --   --   --  29  --  31*   ANIONGAP  --  11  --   --   --   --  8  --   --   --  10  --  12   * 108* 143*  --  127*   < > 106*   < >  --    < > 104*   < > 103*   BUN  --  27.6*  --   --   --   --  27.7*  --   --   --  30.4*  --  26.3*   CR  --  1.45*  --   --   --   --  1.39*  --   --   --  1.46*  --  1.41*   GFRESTIMATED  --  52*  --   --   --   --  55*  --   --   --  52*  --  54*   CALOS  --  8.3*  --   --   --   --  7.8*  --   --   --  8.4*  --  8.7*   MAG  --  1.9  --  1.9  --   --  1.9  --  1.8  --  2.0   < >  --    PROTTOTAL  --  6.4  --   --   --   --  6.2*  --   --   --  7.1  --  7.5   ALBUMIN  --  3.0*  --   --   --   --  2.8*  --   --   --  3.3*  --  3.3*   BILITOTAL  --  0.4  --   --   --   --  0.4  --   --   --  0.5  --  0.5   ALKPHOS  --  245*  --   --   --   --  234*  --   --   --  272*  --  306*   AST  --  120*  --   --   --   --  96*  --   --   --  118*  --  143*   ALT  --  44  --   --   --   --  34  --   --   --  55*  --  76*    < > = values in this interval not displayed.       CBC  Recent Labs   Lab 12/16/22  0536 12/15/22  2116 12/15/22  1319 12/15/22  0437 12/14/22  1717 12/14/22  0651 12/13/22  0725   WBC 7.5  --   --  8.2  --  9.9 9.1   RBC 3.17*  --   --  3.45*  --  4.17* 4.43   HGB 9.5* 9.7* 10.6* 10.2*   < > 12.5* 13.3   HCT 30.3*  --   --  32.3*  --  39.4* 42.5   MCV 96  --   --  94  --  95 96   MCH 30.0  --   --  29.6  --  30.0 30.0   MCHC 31.4*  --   --  31.6  --  31.7 31.3*   RDW 15.8*  --   --  15.9*  --  15.9* 16.3*     --   --  244  --  286 278    <  > = values in this interval not displayed.       INR  Recent Labs   Lab 12/16/22  0536 12/15/22  0437 12/14/22  0651 12/13/22  0725   INR 1.33* 1.65* 1.86* 2.07*       Time/Communication  I personally spent a total of 35 minutes. Plan of care discussed with patient. See my note above for details.    Patient discussed with Dr. Sotelo.

## 2022-12-16 NOTE — PROGRESS NOTES
"  Cardiovascular Surgery Progress Note  12/16/2022         Assessment and Plan:     Eliseo Tanner is a 67 year old male with chronic systolic heart failure secondary to NICM (LVEF 15-20% per TTE 9/2022), s/p HM3 LVAD (2/18/2020, DT) c/b recurrent drive-line infections, moderate CAD, ABHINAV (on CPAP), DMII, HTN, CKD-III, and ANA who was admitted for an acute heart failure exacerbation and concerns for recurrent drive-line infection.  S/P I&D of driveline site on 12/11/22 with Dr Mac Jaramillo.      - Having persistent bleeding from wound bed in multiple places. Recommend holding Coumadin for 1 more day (12/15) to aid in clot formation.  - 1 unit Plasma 12/14 for continued bleeding with INR 1.86.  INR 1.33 today.   - QuickClot gauze in place 12/16 am with pressure dressing. OK to change dressing 12/16 pm shift, replace quickclot gauze and use 2\" kerlix roll for pressure dressing.   - Wait for Vac dressing until bleeding has stopped.     Discussed with Dr Jaramillo through written and verbal communication.      Diego Zuñiga PA-C  Cardiothoracic Surgery  Pager [phone]    12:03 PM   December 16, 2022        Interval History:     No overnight events. Less bleeding overnight.          Physical Exam:   Blood pressure (!) 84/56, pulse 60, temperature 98  F (36.7  C), temperature source Oral, resp. rate 18, weight 79.6 kg (175 lb 8 oz), SpO2 97 %.  Vitals:    12/14/22 0446 12/15/22 0400 12/16/22 0430   Weight: 80.1 kg (176 lb 8 oz) 80.4 kg (177 lb 4.8 oz) 79.6 kg (175 lb 8 oz)      Gen: A&Ox4, NAD  Neuro: no focal deficits   CV: HD stable   Pulm: normal breathing on RA  Incision: driveline incision open with multiple bleeding areas. Silver nitrate applied to medial and lateral apices. Suture from yesterday removed and new suture placed at lateral apex with questionable hemostasis.  Quickclot gauze in base of wound and pressure dressing re-applied.         Data:    Imaging:  reviewed recent imaging, no acute " concerns    Labs:  BMP  Recent Labs   Lab 12/16/22  1140 12/16/22  0642 12/16/22  0536 12/15/22  2128 12/15/22  0837 12/15/22  0437 12/14/22  0736 12/14/22  0651 12/13/22  1102 12/13/22  0725   NA  --   --  139  --   --  136  --  135*  --  137   POTASSIUM  --   --  4.0  --   --  3.8  --  4.1  --  3.9   CHLORIDE  --   --  99  --   --  98  --  96*  --  94*   CALOS  --   --  8.3*  --   --  7.8*  --  8.4*  --  8.7*   CO2  --   --  29  --   --  30*  --  29  --  31*   BUN  --   --  27.6*  --   --  27.7*  --  30.4*  --  26.3*   CR  --   --  1.45*  --   --  1.39*  --  1.46*  --  1.41*   GLC 80 113* 108* 143*   < > 106*   < > 104*   < > 103*    < > = values in this interval not displayed.     CBC  Recent Labs   Lab 12/16/22  0536 12/15/22  2116 12/15/22  1319 12/15/22  0437 12/14/22  1717 12/14/22  0651 12/13/22  0725   WBC 7.5  --   --  8.2  --  9.9 9.1   RBC 3.17*  --   --  3.45*  --  4.17* 4.43   HGB 9.5* 9.7* 10.6* 10.2*   < > 12.5* 13.3   HCT 30.3*  --   --  32.3*  --  39.4* 42.5   MCV 96  --   --  94  --  95 96   MCH 30.0  --   --  29.6  --  30.0 30.0   MCHC 31.4*  --   --  31.6  --  31.7 31.3*   RDW 15.8*  --   --  15.9*  --  15.9* 16.3*     --   --  244  --  286 278    < > = values in this interval not displayed.     INR  Recent Labs   Lab 12/16/22  0536 12/15/22  0437 12/14/22  0651 12/13/22  0725   INR 1.33* 1.65* 1.86* 2.07*      Hepatic Panel  Recent Labs   Lab 12/16/22  0536 12/15/22  0437 12/14/22  0651 12/13/22  0725   * 96* 118* 143*   ALT 44 34 55* 76*   ALKPHOS 245* 234* 272* 306*   BILITOTAL 0.4 0.4 0.5 0.5   ALBUMIN 3.0* 2.8* 3.3* 3.3*     GLUCOSE:   Recent Labs   Lab 12/16/22  1140 12/16/22  0642 12/16/22  0536 12/15/22  2128 12/15/22  1619 12/15/22  1153   GLC 80 113* 108* 143* 127* 148*

## 2022-12-16 NOTE — PROGRESS NOTES
Care Management Follow Up    Length of Stay (days): 8    Expected Discharge Date: 12/17/2022     Concerns to be Addressed: Discharge planning    Patient plan of care discussed at interdisciplinary rounds: Yes    Anticipated Discharge Disposition: Home     Anticipated Discharge Services: Outpatient infusion, Outpatient wound care  Anticipated Discharge DME: Possible wound vac     Education Provided on the Discharge Plan: Yes  Patient/Family in Agreement with the Plan: Yes    Additional Information:  Per Cards 2 and CVTS, plan to restart warfarin today as wound bleeding has slowed. CVTS still monitoring wound to see when it is appropriate to place wound vac, not today. Once vac is placed, plan is for pt to have first vac change done here so pt will be here through the weekend.    This writer called and left voicemail with pt's local hospital pharmacist who is assisting in coordinating/scheduling outpatient infusions and wound vac dressing changes.    [facility] for [facility]- can provide q8hr infusions and wound vac changes)  Ph: [phone]  Fax: [phone]    [employer] (For home wound vac)- submitted 12/15, home vac not yet approved.    CC will continue to monitor patient's medical condition and progress towards discharge.  Carmita Farnsworth RN BSN  6C Unit Care Coordinator  Phone number: [phone]  Pager: [phone]

## 2022-12-17 NOTE — PROGRESS NOTES
[facility]   Cardiology II Service / Advanced Heart Failure  Daily Progress Note      Patient: Eliseo Tanner  MRN: [id number]  Admission Date: 12/8/2022  Hospital Day # 9    Assessment and Plan: Eliseo Tanner is a 67 year old male with chronic systolic heart failure secondary to NICM (LVEF 15-20% per TTE 9/2022), s/p HM3 LVAD (2/18/2020, DT) c/b recurrent drive-line infections, moderate CAD, ABHINAV (on CPAP), DMII, HTN, CKD-III, and ANA who was admitted for an acute heart failure exacerbation and concerns for recurrent drive-line infection.    Today's Plan:  -continue warfarin  -q8hr hgb, for now - stable  -ID reconsulted    # Recurrent driveline infections, acute on chronic  # s/p abdominal wound I&D 12/11/22  # Acute blood loss anemia 2/2 above  # Wound culture at OSH +MRSA  H/o recurrent MSSA infections. on IV cefazolin 10/21-11/18 due to increased driveline drainage with +MSSA, which improved but did not completely resolve.  He was transitioned to po bactrim 11/19, but then developed increased purulent drainage 1 week later, prompting this admission.  Would cultures from OSH +S. Aureus, would cultures from admission also +S. Aureus. CT abdomen revealed fluid along driveline tract, increased from prior, but no organized abscess. S/p I&D per CVTS on 12/11. [place] wound culture from 12/12 growing MRSA (prior infections tetra-resistant MSSA-.  - ID and CVTS consulted, appreciate recs  - stopped andres and zosyn 12/9  - stopped vanco 12/11 and started cefazolin 2g IV q8 hours per ID  - NOTE: he does not have insurance coverage for home antibiotics, historically has been able to getinfusions at his local clinic (and ER on the weekends/holidays).  He would prefer to do this rather than SNF.  - ID reconsulted today for MRSA culture finding  - ongoing bleeding/wound drainage requiring frequent packing   - s/p 1 unit FFP 12/14   - stitch placed 12/15 by CVTS, bleeding slowed   - [id number] given drop in Hgb, Hgb  stable mid 9s (baseline 12-12)   - resume warfarin 12/16 per CVTS, continue to hold aspirin    # Chronic systolic heart failure/HFrEF secondary to NICM   # s/p HM3 LVAD as DT on 2/2020   Stage D. NYHA Class II-III RHC 12/12/22: RA 9, mPA 22, PCW 13    - Fluid status: euvolemic, Bumex 5 mg bid (pta 60 mg bid, lower dose due to ongoing bleeding)  - ACEi/ARB/ARNi: deferred, Entresto caused BERNARDA per notes  - Afterload reduction: hydralazine 100 mg tid and Imdur 90 mg daily  - nBB contraindicated due to bradycardia and RV failure per prior notes  - Aldosterone antagonist deferred  - SGLT2i: Farxiga 5 mg daily - consider increase to 10 mg daily   - SCD prophylaxis ICD  - Antiplatelet:  aspirin 81mg daily on hold for bleeding  - Anticoagulation: warfarin per pharmacy goal INR 1.7-2.3 (per [facility] notes, notes say 1.8-2.5). INR today 1.2, warfarin held since 12/13, s/p 1 unit FFP 12/14, resumed 12/16  - MAPs: 70s-80s  - LDH:  364 (12/10)  - Remote monitoring: Card[employer] has home pillow    # HTN  - continue hydral, imdur, plus amlodipine 10mg daily.     # Hypokalemia, diuretic induced, resolved  - replete K per protocol  - continue to monitor     # Hepatic injury, likely cholestatic, R factor 1  Found to have elevated , , Alk Phos 330. Likely predominantly cholestatic given R factor 1. Possibly related to congestion. CT with chloeithiasis without cholecysitits.   - PTA atorvastatin has been held, resume today and monitor  - RUQ ultrasound if not improving with diuresis      # CKD III  Baseline Cr 1.6-1.9 GFR low 40s.   - Cr remains stable/below baseline     # Atrial fibrillation  # Slow VT  [id number] 4. History of afib w/ RVR and aberrancy vs. NATHALIA vs. AT vs. Slow VT.  S/p DCCV on 2/28/22, back into sinus rhythm.  - continue pta amiodarone 200mg daily     # CAD  Mild to moderate CAD with severe branch disease per angiogram 11/2019.   - continue ASA, statin as above  - BB deferred as above     # Hypothyroidism    Last TSH 5.16 on 10/18/22, 6.11 with FT4 of 1.19 this admission. On amiodarone as above   - continue levothyroxine 50mcg daily     # DM II  A1c 6.3 on 9/2/22.  - continue dapagliflozin  - Low-intensity sliding scale     # Insomnia - pta zolpidem  # BPH, urinary retention - pta finasteride, tamsulosin   # GERD - pta PPI  # Gout - no recent flares, allopurinol discontinued in the past   # IgG Kappa monoclonal gammopathy of undetermined significance - followed by heme/onc outpt     FEN: 2g Na 2L FR  PROPHY:  Therapeutic INR, PPI  LINES:  PIV   DISPO:  Pending Hgb and wound/bleeding stability  CODE STATUS:  Full code    Siena Aguiar, CORY, NP-C  Advanced Heart Failure/Cardiology II Service  Pager [phone] [facility] [phone]    ================================================================    Subjective/24-Hr Events:   Last 24 hr care team notes reviewed. No need for change in dressing overnight. Hgb is stable. MAP stable. Denies LE edema or edema.     ROS:  4 point ROS including respiratory, CV, GI and  (other than that noted in the HPI) is negative.     Medications: Reviewed in EPIC.     Physical Exam:   BP 99/65 (BP Location: Right arm, Cuff Size: Adult Regular)   Pulse 62   Temp 98.2  F (36.8  C) (Oral)   Resp 16   Wt 80.1 kg (176 lb 8 oz)   SpO2 98%   BMI 27.16 kg/m      GENERAL: Appears comfortable, in no distress.  HEENT: Eye symmetrical, no discharge or icterus bilaterally. Mucous membranes moist and without lesions.  NECK: Supple, JVD below clavicle at 90 degrees.   CV: +mechanical LVAD hum.   RESPIRATORY: Respirations regular, even, and unlabored. Lungs CTA throughout.    GI: Soft and non distended with normoactive bowel sounds present in all quadrants. No tenderness, rebound, guarding.   EXTREMITIES: No peripheral edema. 2+ bilateral pedal pulses.   NEUROLOGIC: Alert and oriented x 3. No focal deficits.   MUSCULOSKELETAL: No joint swelling or tenderness.   SKIN: No jaundice. No rashes or lesions.      Labs:  [facility]  Recent Labs   Lab 12/17/22  0656 12/17/22  0650 12/16/22  2137 12/16/22  1613 12/16/22  1544 12/16/22  0642 12/16/22  0536 12/15/22  2128 12/15/22  1637 12/15/22  0837 12/15/22  0437 12/14/22  0736 12/14/22  0651     --   --   --   --   --  139  --   --   --  136  --  135*   POTASSIUM 4.4  --   --   --   --   --  4.0  --   --   --  3.8  --  4.1   CHLORIDE 98  --   --   --   --   --  99  --   --   --  98  --  96*   CO2 30*  --   --   --   --   --  29  --   --   --  30*  --  29   ANIONGAP 8  --   --   --   --   --  11  --   --   --  8  --  10   * 134* 151* 178*  --    < > 108*   < >  --    < > 106*   < > 104*   BUN 30.1*  --   --   --   --   --  27.6*  --   --   --  27.7*  --  30.4*   CR 1.52*  --   --   --   --   --  1.45*  --   --   --  1.39*  --  1.46*   GFRESTIMATED 49*  --   --   --   --   --  52*  --   --   --  55*  --  52*   CALOS 8.1*  --   --   --   --   --  8.3*  --   --   --  7.8*  --  8.4*   MAG 2.2  --   --   --  2.0  --  1.9  --  1.9  --  1.9   < > 2.0   PROTTOTAL 6.3*  --   --   --   --   --  6.4  --   --   --  6.2*  --  7.1   ALBUMIN 2.9*  --   --   --   --   --  3.0*  --   --   --  2.8*  --  3.3*   BILITOTAL 0.4  --   --   --   --   --  0.4  --   --   --  0.4  --  0.5   ALKPHOS 260*  --   --   --   --   --  245*  --   --   --  234*  --  272*   *  --   --   --   --   --  120*  --   --   --  96*  --  118*   ALT 48  --   --   --   --   --  44  --   --   --  34  --  55*    < > = values in this interval not displayed.       CBC  Recent Labs   Lab 12/17/22  0656 12/16/22  2055 12/16/22  1337 12/16/22  0536 12/15/22  1319 12/15/22  0437 12/14/22  1717 12/14/22  0651   WBC 7.9  --   --  7.5  --  8.2  --  9.9   RBC 3.08*  --   --  3.17*  --  3.45*  --  4.17*   HGB 9.4* 9.6* 9.4* 9.5*   < > 10.2*   < > 12.5*   HCT 30.1*  --   --  30.3*  --  32.3*  --  39.4*   MCV 98  --   --  96  --  94  --  95   MCH 30.5  --   --  30.0  --  29.6  --  30.0   MCHC 31.2*  --   --  31.4*  --  31.6   --  31.7   RDW 15.8*  --   --  15.8*  --  15.9*  --  15.9*     --   --  266  --  244  --  286    < > = values in this interval not displayed.       INR  Recent Labs   Lab 12/17/22  0656 12/16/22  0536 12/15/22  0437 12/14/22  0651   INR 1.21* 1.33* 1.65* 1.86*       Time/Communication  I personally spent a total of 35 minutes. Plan of care discussed with patient. See my note above for details.    Patient discussed with Dr. Sotelo.

## 2022-12-17 NOTE — PROGRESS NOTES
[facility] ID Service: Follow Up Note      Patient:  Eliseo Tanner   Date of birth 1955, Medical record number [id number]  Date of Visit:  12/17/2022  Date of Admission: 12/8/2022         Assessment and Recommendations:   ID Problem List:  1. 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 NICM s/p HM3 LVAD and ICD placement (2/18/2020)  3. CKD  4. ID history:   - MSSA bacteremia (11/2020) 2/2 MSSA driveline infection   - Proteus and enterococcus bacteremia  - severe Legionella pneumonia (serogroup 1L) c/b cardiogenic shock, renal failure requiring CRRT, respiratory failure requiring intubation       Recommendations:  1. Start daptomycin 500mg (6mg/kg) IV q24hrs- MRSA isolated from OSH cultures, will cover both MRSA and MSSA  2. Stop cefazolin  3. Plan for at least 4 week course from date of transition to daptomycin (12/17/22), will need to see ID prior to stopping abx  4. Weekly labs (fax to [phone]): CBC w/ diff, CMP, CK  5. ID follow up in 3-4 weeks (to be seen prior to stopping IV abx) to discuss PO suppressive regimen  6. ID will sign off at this time, please don't hesitate to contact the [facility] General ID team should further questions arise.      Discussion:  Eliseo Tanner is a 67 year old male with history of NICM s/p HM3 LVAD and ICD placement (2/18/2020) c/b chronic MSSA driveline infection and subsequent MSSA bacteremia (11/2020), DM, CKD, who was admitted to [facility] on 12/8/22 with increased driveline drainage and heart failure exacerbation.     Previously on suppressive regimens for chronic MSSA (tetracycline R) driveline infection with cephalexin and cefadroxil with breakthrough drainage despite in vitro susceptibilities. S/p 4 weeks of IV cefazolin (10/21-11/18) for worsening of chronic MSSA (tetracycline R) driveline infection. Drainage improved  but did not fully resolve. He transitioned to PO Bactrim (renally adjusted 1 single strength tab daily) on 11/19 for suppression but had increased purulent drainage.     On arrival started on broad spectrum antibiotics + micafungin, narrowed to cefazolin. CT C/A/P (12/8) demonstrated LVAD driveline with surrounding fluid collection increased from prior. Now s/p I&D on 12/11/22 with purulent drainage sent for culture. Cultures from [facility] with MSSA x2 strains. Team previously considered ceftriaxone (not first line, insuficient data to recommend trial of this), addition of gentamicin (avoiding due to renal status), and addition of rifampin/rifabutin (avoided due to previously elevated LFTs). Planned for discharge on cefazolin 2g IV q8hrs. Patient does not have home infusion coverage and receives his IV abx at local hospital 4 blocks from his home.    On 12/17 ID was contacted when culture from [place] (12/7/22) resulted with MRSA from LVAD driveline drainage. Previously unidentified MRSA may explain why infection was not as responsive to prior regimen as expected and developed worsening fluid collection. Recommend transition to daptomycin to cover MRSA and MSSA. This will be once daily infusion and avoid risk of BERNARDA associated with vancomycin.      Prolonged Parenteral/Oral Antibiotic Recommendations and ID Follow up  This template provides final ID recommendations as of this date. If there are clinical changes or questions please call the ID team.     Infectious Diseases Indication: MSSA/MRSA driveline infection    Antibiotic Information  Name of Antibiotic Dose of Antibiotic1 Pharmacy to assist with dosing Y/N Anticipated duration Effective start date2 End date   daptomycin 500mg IV q24hrs N TBD, at least 4 weeks 12/17/22                    1.Dose of antibiotic will need to be renally adjusted if creatinine clearance changes  2.Effective start date is the date of therapy with appropriate spectrum    Method of  "antibiotic delivery:Midline. At the end of therapy should the line be removed? Yes. Selecting \"yes\" will function as written order to remove PICC line at the end of therapy.    Tentative plans for disposition: Home    Weekly labs required: CK, CBC with diff and CMP. Dr. Bhatti will follow labs at discharge until ID follow up. Please have labs faxed to ID clinic.    Appointment to be scheduled: Before this date 1/13/2023. Type of ID Clinic Appointment Virtual Video visit. Appointment will be scheduled with: Magy. Routine hospital follow up appointments are 30 minutes. If 60 minutes is necessary due to complexity of case indicate that a 60 min appointment is required. Appointment time Appointment Time: 30 minutes. If the patient remains in the hospital at this date please re-consult ID.     Imaging for ID follow up: ID Imaging: Follow up imaging for ID purposes not recommended at the time of this documentation.    ID provider to route this note to the appropriate Epic pools: [facility] INFECTIOUS DISEASE ADULT [facility], [facility]DA, [facility]    [facility]/ID Clinic Information:  [facility], Clinic [address] [place] [address]  [id number]  Phone: [phone]  Fax: [phone] (Attention Nilsa Toledo)        Recs were discussed with primary team today. Don't hesitate to call with questions.     Attestation:  I have reviewed today's vital signs, medications, labs and imaging.  Anaid Redding PA-C, Pager # [phone]        Time Spent on this Encounter   I spent 35 minutes on the unit/floor managing the care of Eliseo Tanner. Over 50% of my time was spent on the following:   - Counseling the patient and/or family regarding: diagnostic results, risks and benefits of treatment options and recommended follow-up  - Coordination of care with the: primary service          Interval History:       No fevers, renal function stable. Updated cx from [place] with MRSA.   Eliseo is feeling well today. No new symptoms or concerns. Dressing " changed by surgery team earlier, plan for wound vac, possibly Monday.     Wife (Chapis) on speaker phone during visit. Updated Eliseo and Chapis on culture results from 12/7 at [facility]- growing a mix of MRSA and MSSA and different antibiotic susceptibilities of these two strains of Staph. Will recommend transition from cefazolin to daptomycin. Daptomycin is not first line choice for MSSA, however, now that MRSA has grown on culture that will need to be covered too, making daptomycin a more ideal choice at this point. Discussed plan for daily infusion, weekly lab monitoring while on IV therapy, and video visit with primary LVAD ID provider.          Review of Systems:   Focused 5 pt ROS obtained, pertinent positives and negatives as above.          Current Antimicrobials   Current:  - Cefazolin (12/11-present)    Prior:  - micafungin (12/8)  - Zosyn (12/8-12/9)  - Vancomycin (12/8-12/10)  - Bactrim (11/19-12/8)  - Cefazolin (10/21-11/18)         Physical Exam:   Ranges for vital signs:  Temp:  [98  F (36.7  C)-99.6  F (37.6  C)] 99.1  F (37.3  C)  Pulse:  [59-62] 62  Resp:  [16-20] 16  BP: (88-99)/(65-81) 88/68  SpO2:  [94 %-98 %] 94 %    Intake/Output Summary (Last 24 hours) at 12/17/2022 1251  Last data filed at 12/17/2022 1200  Gross per 24 hour   Intake 1670 ml   Output 3345 ml   Net -1675 ml     Exam:  GENERAL:  Awake, alert, oriented, lying supine in bed in NAD.   ENT:  Head is normocephalic, atraumatic. Oropharynx is moist  EYES:  Eyes have anicteric sclerae, noninjected conjunctivae.    LUNGS:  Normal respiratory effort on RA.  SKIN:  No acute rashes on visible surfaces.  Midline is in place without any surrounding erythema.           Laboratory Data:   Reviewed.  Pertinent for:    Culture data:  Microbiology:        Culture   Date Value Ref Range Status   12/11/2022 No anaerobic organisms isolated after 6 days  Preliminary   12/11/2022 No growth after 6 days  Preliminary   12/11/2022 1+ Staphylococcus aureus (A)   Final     Comment:     Susceptibilities done on previous cultures   12/11/2022 1+ Staphylococcus aureus (A)  Final     Comment:     Susceptibilities done on previous cultures   12/08/2022 No Growth  Final   12/08/2022 2+ Staphylococcus aureus (A)  Final   12/08/2022 2+ Staphylococcus aureus (A)  Final   12/08/2022 No Growth  Final   10/18/2022 2+ Staphylococcus aureus (A)  Final   10/18/2022 2+ Staphylococcus aureus (A)  Final   09/20/2022 2+ Staphylococcus aureus (A)  Final   09/20/2022 1+ Normal freeman  Final   09/20/2022 No anaerobic organisms isolated  Final   02/10/2022 1+ Staphylococcus aureus (A)  Final   02/10/2022 1+ Cutibacterium (Propionibacterium) acnes (A)  Final     Comment:     Susceptibility testing of Cutibacterium (Propionibacterium) species is not done from this source. This organism is susceptible to penicillin and cefotaxime and most are susceptible to clindamycin.   08/17/2021 No Growth  Final   08/17/2021 No Growth  Final   08/17/2021 1+ Staphylococcus aureus (A)  Final   08/17/2021 No anaerobic organisms isolated  Final       Inflammatory Markers    Recent Labs   Lab Test 12/10/22  0616 12/08/22  1256 08/17/21  0807 02/16/21  2219 02/15/21  1910   SED  --  49*  --  72* 47*   CRP 26.30* 31.50* 4.9 270.0* 270.0*       Hematology Studies    Recent Labs   Lab Test 12/17/22  0656 12/16/22  2055 12/16/22  1337 12/16/22  0536 12/15/22  1319 12/15/22  0437 12/14/22  1717 12/14/22  0651   WBC 7.9  --   --  7.5  --  8.2  --  9.9   HGB 9.4* 9.6* 9.4* 9.5*   < > 10.2*   < > 12.5*   MCV 98  --   --  96  --  94  --  95     --   --  266  --  244  --  286    < > = values in this interval not displayed.     Recent Labs   Lab Test 03/07/21  0543 03/06/21  0430 03/05/21  0400 03/04/21  0405   ANEU 2.6 2.8 3.6 3.8   AEOS 0.3 0.2 0.2 0.1       Metabolic Studies     Recent Labs   Lab Test 12/17/22  0656 12/16/22  0536 12/15/22  0437 12/14/22  0651 09/02/22  2202 09/02/22  1656 02/24/21  1855 02/24/21  0442  02/23/21  2130 02/15/21  2127 02/15/21  1910    139 136 135*   < > 133*   < > 132*  --    < > 129*   POTASSIUM 4.4 4.0 3.8 4.1   < > 3.7   < > 4.1  --    < > 5.1   CHLORIDE 98 99 98 96*   < > 95*   < > 99  --    < > 97   CO2 30* 29 30* 29   < > 26   < > 25  --    < > 16*   BUN 30.1* 27.6* 27.7* 30.4*   < > 48.8*   < > 44*  --    < > 78*   CR 1.52* 1.45* 1.39* 1.46*   < > 2.00*   < > 3.84*  --    < > 3.23*   GFRESTIMATED 49* 52* 55* 52*   < > 35*   < > 15*  --    < > 19*   ANIONGAP 8 11 8 10   < > 12   < > 8  --    < > 17*   A1C  --   --   --   --   --  6.3*  --   --   --   --   --    LACT  --   --   --   --   --   --   --  0.7 1.4   < > 6.9*   CKT 34*  --   --   --   --   --   --   --   --   --  884*    < > = values in this interval not displayed.       Hepatic Studies    Recent Labs   Lab Test 12/17/22  0656 12/16/22  0536 12/15/22  0437 12/11/22  0657 12/10/22  1310 12/08/22  1256 10/18/22  1250   BILITOTAL 0.4 0.4 0.4   < >  --    < > 0.5   ALKPHOS 260* 245* 234*   < >  --    < > 175*   ALBUMIN 2.9* 3.0* 2.8*   < >  --    < > 4.0   * 120* 96*   < >  --    < > 93*   ALT 48 44 34   < >  --    < > 88*   LDH  --   --   --   --  364*  --  283*    < > = values in this interval not displayed.       Urine Studies     Recent Labs   Lab Test 02/16/21  0225 11/17/20  0825 02/22/20  0944 02/13/20  0334   URINEPH 5.5 5.5 5.5 6.0   NITRITE Negative Negative Negative Negative   LEUKEST Negative Negative Small* Small*   WBCU 0 0 2 81*       Last check of C difficile  C Diff Toxin B PCR   Date Value Ref Range Status   11/15/2020 Negative NEG^Negative Final     Comment:     Negative: C. difficile target DNA sequences NOT detected, presumed negative   for C.difficile toxin B or the number of bacteria present may be below the   limit of detection for the test.  FDA approved assay performed using [employer] GeneXpert real-time PCR.  A negative result does not exclude actual disease due to C. difficile and may   be due to  improper collection, handling and storage of the specimen or the   number of organisms in the specimen is below the detection limit of the assay.         Drug Level Monitoring  Recent Labs   Lab Test 12/10/22  1948 02/14/20  0331 02/13/20  2147   VANCOMYCIN 11.3   < >  --    TOBRA  --   --  13.0    < > = values in this interval not displayed.       Hepatitis B Testing   Recent Labs   Lab Test 02/28/21  1155 02/12/20  0440 02/08/20  0408   HBCAB  --  Nonreactive Nonreactive   HEPBANG Nonreactive Nonreactive Nonreactive       Hepatitis C Testing     Hepatitis C Antibody   Date Value Ref Range Status   02/12/2020 Nonreactive NR^Nonreactive Final     Comment:     Assay performance characteristics have not been established for newborns,   infants, and children     02/08/2020 Nonreactive NR^Nonreactive Final     Comment:     Assay performance characteristics have not been established for newborns,   infants, and children                Imaging:   CT Chest/abd/pelvis w/o contrast (12/8/22)  IMPRESSION:   1. LVAD drive line with surrounding fluid in the along the tract in  the subcutaneous right anterior chest wall increased from previous.  Trace fluid surrounding the LVAD outflow tract , similar to prior. No  organized fluid collection or significant associated inflammatory  changes.  2. Left inguinal lymphadenopathy and stable prominent mediastinal  lymph nodes, likely reactive  3. Cholelithiasis without evidence of cholecystitis. Mild intrahepatic  biliary ductal dilation.  4. Trace ascites    US Abd (12/8/22)  IMPRESSION:   1.  Normal grayscale and Doppler evaluation of the liver, as well as  portal and hepatic vasculature.  2.  Decompressed gallbladder without evidence of acute cholecystitis.  No evidence of cholestatic disease process. There is a 3 mm  gallbladder cholesterol polyp which does not require follow-up.

## 2022-12-17 NOTE — PLAN OF CARE
"Neuro: A&Ox4.   Cardiac: Ventricular paced 60bpm. LVAD stable   Respiratory: RA   GI/: Voiding spontaneously. 1 BM this shift.   Diet/appetite: Tolerating 2 gram Na diet. Denies nausea   Activity: Up Independently     Pain: . Denies   Skin: see flowsheet, driveline dressing changed. No bleeding noted. Nurses can change dressing as needed.  Lines: Double lumen midline saline locked  Drains:  Replacements: No replacement needed today    Pt has been resting comfortably throughout day, will continue to monitor and follow plan of care.      Problem: Plan of Care - These are the overarching goals to be used throughout the patient stay.    Goal: Plan of Care Review  Description: The Plan of Care Review/Shift note should be completed every shift.  The Outcome Evaluation is a brief statement about your assessment that the patient is improving, declining, or no change.  This information will be displayed automatically on your shift note.  Outcome: Progressing  Flowsheets (Taken 12/17/2022 1419)  Plan of Care Reviewed With: patient  Goal: Patient-Specific Goal (Individualized)  Description: You can add care plan individualizations to a care plan. Examples of Individualization might be:  \"Parent requests to be called daily at 9am for status\", \"I have a hard time hearing out of my right ear\", or \"Do not touch me to wake me up as it startles me\".  Outcome: Progressing  Goal: Absence of Hospital-Acquired Illness or Injury  Outcome: Progressing  Intervention: Identify and Manage Fall Risk  Recent Flowsheet Documentation  Taken 12/17/2022 0800 by Karley Bill RN  Safety Promotion/Fall Prevention: clutter free environment maintained  Intervention: Prevent Skin Injury  Recent Flowsheet Documentation  Taken 12/17/2022 0800 by Karley Bill RN  Body Position: position changed independently  Intervention: Prevent and Manage VTE (Venous Thromboembolism) Risk  Recent Flowsheet Documentation  Taken 12/17/2022 0800 by Karley Bill, " RN  VTE Prevention/Management: SCDs (sequential compression devices) off  Goal: Optimal Comfort and Wellbeing  Outcome: Progressing  Goal: Readiness for Transition of Care  Outcome: Progressing     Problem: Diabetes Comorbidity  Goal: Blood Glucose Level Within Targeted Range  Outcome: Progressing     Problem: Hypertension Comorbidity  Goal: Blood Pressure in Desired Range  Outcome: Progressing  Intervention: Maintain Blood Pressure Management  Recent Flowsheet Documentation  Taken 12/17/2022 0800 by Karley Bill RN  Medication Review/Management: medications reviewed     Problem: Ventricular Assist Device  Goal: Optimal Adjustment to Device  Outcome: Progressing  Goal: Absence of Bleeding  Outcome: Progressing  Goal: Absence of Embolism Signs and Symptoms  Outcome: Progressing  Intervention: Prevent or Manage Embolism  Recent Flowsheet Documentation  Taken 12/17/2022 0800 by Karley Bill RN  VTE Prevention/Management: SCDs (sequential compression devices) off  Goal: Optimal Blood Flow  Outcome: Progressing  Goal: Absence of Infection Signs and Symptoms  Outcome: Progressing  Goal: Effective Right-Sided Heart Function  Outcome: Progressing  Intervention: Manage Decreased Right Heart Function  Recent Flowsheet Documentation  Taken 12/17/2022 0800 by Karley Bill RN  Medication Review/Management: medications reviewed   Goal Outcome Evaluation:      Plan of Care Reviewed With: patient

## 2022-12-17 NOTE — PLAN OF CARE
[id number]    VSS. LVAD stable and without alarms. Driveline site (previously changed by CVTS today) remains CDI without evidence of bleeding. Magnesium replace per protocol. Voiding adequately in urinal. Tolerating PO intake. Independent in room. Plan to continue to monitor patient. Next hgb check at 9pm. Notify Cards 2 with changes or concerns.

## 2022-12-17 NOTE — PROGRESS NOTES
Cardiovascular Surgery Progress Note  12/17/2022         Assessment and Plan:     Eliseo Tanner is a 67 year old male with chronic systolic heart failure secondary to NICM (LVEF 15-20% per TTE 9/2022), s/p HM3 LVAD (2/18/2020, DT) c/b recurrent drive-line infections, moderate CAD, ABHINAV (on CPAP), DMII, HTN, CKD-III, and ANA who was admitted for an acute heart failure exacerbation and concerns for recurrent drive-line infection.  S/P I&D of driveline site on 12/11/22 with Dr Mac Jaramillo.      - Having persistent bleeding from wound bed in multiple places. Recommend holding Coumadin for 1 more day (12/15) to aid in clot formation.  - 1 unit Plasma 12/14 for bleeding with INR 1.86. INR 1.21 today. Hgb 9.4 (stable).   - QuickClot gauze in place 12/16 am but now without pressure dressing. OK to change dressing 12/17 pm shift, replace quickclot gauze with saline moistened kerlix (wet to dry).  - Wait for Vac dressing until bleeding has stopped.    Discussed with Dr Jaramillo through written and verbal communication.      Diego Zuñiga PA-C  Cardiothoracic Surgery  Pager [phone]    8:15 AM   December 17, 2022        Interval History:     No overnight events.  States pain is well managed on current regimen. Slept well overnight.         Physical Exam:   Blood pressure 99/65, pulse 62, temperature 98.2  F (36.8  C), temperature source Oral, resp. rate 16, weight 80.1 kg (176 lb 8 oz), SpO2 98 %.  Vitals:    12/15/22 0400 12/16/22 0430 12/17/22 0430   Weight: 80.4 kg (177 lb 4.8 oz) 79.6 kg (175 lb 8 oz) 80.1 kg (176 lb 8 oz)      Gen: A&Ox4, NAD  Neuro: no focal deficits   CV: HD stable.   Pulm: normal breathing on RA  Driveline Incision:   - open wound with no active bleeding. Wound bed clean, no drainage noted.   - replaced quickclot gauze in base of wound but no pressure dressing today.         Data:    Imaging:  reviewed recent imaging, no acute concerns    Labs:  BMP  Recent Labs   Lab 12/17/22  0621  12/17/22  0650 12/16/22  2137 12/16/22  1613 12/16/22  0642 12/16/22  0536 12/15/22  0837 12/15/22  0437 12/14/22  0736 12/14/22  0651     --   --   --   --  139  --  136  --  135*   POTASSIUM 4.4  --   --   --   --  4.0  --  3.8  --  4.1   CHLORIDE 98  --   --   --   --  99  --  98  --  96*   CALOS 8.1*  --   --   --   --  8.3*  --  7.8*  --  8.4*   CO2 30*  --   --   --   --  29  --  30*  --  29   BUN 30.1*  --   --   --   --  27.6*  --  27.7*  --  30.4*   CR 1.52*  --   --   --   --  1.45*  --  1.39*  --  1.46*   * 134* 151* 178*   < > 108*   < > 106*   < > 104*    < > = values in this interval not displayed.     CBC  Recent Labs   Lab 12/17/22  0656 12/16/22  2055 12/16/22  1337 12/16/22  0536 12/15/22  1319 12/15/22  0437 12/14/22  1717 12/14/22  0651   WBC 7.9  --   --  7.5  --  8.2  --  9.9   RBC 3.08*  --   --  3.17*  --  3.45*  --  4.17*   HGB 9.4* 9.6* 9.4* 9.5*   < > 10.2*   < > 12.5*   HCT 30.1*  --   --  30.3*  --  32.3*  --  39.4*   MCV 98  --   --  96  --  94  --  95   MCH 30.5  --   --  30.0  --  29.6  --  30.0   MCHC 31.2*  --   --  31.4*  --  31.6  --  31.7   RDW 15.8*  --   --  15.8*  --  15.9*  --  15.9*     --   --  266  --  244  --  286    < > = values in this interval not displayed.     INR  Recent Labs   Lab 12/17/22  0656 12/16/22  0536 12/15/22  0437 12/14/22  0651   INR 1.21* 1.33* 1.65* 1.86*

## 2022-12-17 NOTE — PLAN OF CARE
Physical Therapy: Orders received. Chart reviewed and discussed with care team.? Physical Therapy not indicated due to patient currently being independent with mobility. I spoke with patient this morning regarding any potential for PT while here. He is up independently in his room and is very seasoned at managing his LVAD. He is very active at home and anticipates no issues with discharging once his wound vac is in place.? Defer discharge recommendations to medical team. There are no currently acute care PT needs.? Will complete orders.

## 2022-12-17 NOTE — PROCEDURES
The patient's HeartMate LVAD was interrogated 12/17/2022  * Speed 5800 rpm   * Pulsatility index 2-3.5  * Power 4.8 Driver   * Flow 5-5.2 L/minute   Fluid status: euvolemic   Alarms were reviewed, and notable for few PI events.  The driveline exit site was inspected, pressures dressing cdi.   All external components were inspected and showed no evidence of damage or malfunction, none replaced.   No changes to VAD settings made

## 2022-12-17 NOTE — PROGRESS NOTES
6C Shift note [id number]    Neuro: A&Ox4. [place]. PRN Ambien given at bedtime to help sleep.   Cardiac: Ventricular Paced 60bpm. MAP in 80's. LVAD stable, no alarms. Q8H Hgb checks, stable.  Respiratory: Sating >92% on RA.  GI/: Adequate urine output. LBM 12/16  Diet/appetite: Tolerating 2 gram sodium diet. Eating well.  Activity:  Independent in room  Pain: No pain   Skin: Driveline dressing changed in the evening by Surgery team. Dressing is CDI, no bleeding noted overnight.  LDA's: L arm double lumen midline saline locked. R FA PIV saline locked.    Plan: Continue with POC. Notify primary team with changes.

## 2022-12-17 NOTE — PROGRESS NOTES
Care Management Follow Up    Length of Stay (days): 8    Expected Discharge Date: 12/17/2022     Concerns to be Addressed:       Patient plan of care discussed at interdisciplinary rounds: Yes    Anticipated Discharge Disposition: Home w/ outpatient IV abx plan and wound vac plan--see RNCC note     Anticipated Discharge Services:    Anticipated Discharge DME:      Patient/family educated on Medicare website which has current facility and service quality ratings:    Education Provided on the Discharge Plan:    Patient/Family in Agreement with the Plan:      Referrals Placed by CM/SW:    Private pay costs discussed: Not applicable    Additional Information:  LVAD  continuing to follow. Provided supportive check-in to pt. Pt understands he will be here through the weekend and possible discharge early next week. Feels well informed and in agreement with plan of care. Pt's wife will provide transportation home but she will need advanced notice as she is coming from [place].     Pt is aware that writer is out of the office next week and colleague will be available should he have any SW needs.       VERÓNICA ChavesSW

## 2022-12-18 NOTE — PROCEDURES
The patient's HeartMate LVAD was interrogated 12/18/2022  * Speed 5800 rpm   * Pulsatility index 2.5-3.6  * Power 4.7 Driver   * Flow 4.9 L/minute   Fluid status: euvolemic   Alarms were reviewed, and notable for few PI events.  The driveline exit site was inspected, pressures dressing cdi.   All external components were inspected and showed no evidence of damage or malfunction, none replaced.   No changes to VAD settings made

## 2022-12-18 NOTE — PLAN OF CARE
"Neuro: A&Ox4.   Cardiac: Afebrile, VSS. Doppler MAPs WNL. HM3 without alarms.   Respiratory: RA.. Wears home CPAP at night.  GI/: Voiding spontaneously. BM earlier today.  Diet/appetite: Tolerating 2gm Na diet. BGs AC/HS.  Activity: Up ad tiffanie.  Pain: Denies   Skin: Scattered bruising. New orders today for driveline packing to be done BID by RNs. Did this evening's dressing. Used about 1/2 roll of 2\" kerlix. Wound bed with areas of dried blood by suture. A few areas with scant red blood.  Lines: DL Midline.  Drains:   Replacements: PM Mg 2.0. First dose of oral mag replacement given.        Goal Outcome Evaluation:      Plan of Care Reviewed With: patient                 "

## 2022-12-18 NOTE — PROGRESS NOTES
Cardiovascular Surgery Progress Note  12/18/2022         Assessment and Plan:     Eliseo Tanner is a 67 year old male with chronic systolic heart failure secondary to NICM (LVEF 15-20% per TTE 9/2022), s/p HM3 LVAD (2/18/2020, DT) c/b recurrent drive-line infections, moderate CAD, ABHINAV (on CPAP), DMII, HTN, CKD-III, and ANA who was admitted for an acute heart failure exacerbation and concerns for recurrent drive-line infection.  S/P I&D of driveline site on 12/11/22 with Dr Mac Jaramillo.      - Had persistent bleeding from wound bed in multiple places through 12/16. Recommended to hold Coumadin through 12/15 to aid in clot formation.  - 1 unit Plasma 12/14 for bleeding with INR 1.86. INR 1.22 today. Hgb 9.3 (stable).   - QuickClot gauze in place 12/16 am but now without pressure dressing. OK to change dressing 12/17 pm shift, replace quickclot gauze with saline moistened kerlix (wet to dry).  - Wait for Vac dressing until bleeding has stopped.    Discussed with Dr Jaramillo/Elvia through written and verbal communication.      Diego Zuñiga PA-C  Cardiothoracic Surgery  Pager [phone]    8:30 AM   December 18, 2022        Interval History:     No overnight events.  States pain is well managed on current regimen. Slept well overnight.         Physical Exam:   Blood pressure 105/86, pulse 57, temperature 98.1  F (36.7  C), temperature source Oral, resp. rate 20, weight 80.7 kg (177 lb 14.4 oz), SpO2 97 %.  Vitals:    12/16/22 0430 12/17/22 0430 12/18/22 0425   Weight: 79.6 kg (175 lb 8 oz) 80.1 kg (176 lb 8 oz) 80.7 kg (177 lb 14.4 oz)      Gen: A&Ox4, NAD  Neuro: no focal deficits   CV: HD stable w VAD  Pulm: normal breathing on RA  Incision: measures 10 cm long x 3 cm wide x 2 cm deep. No bleeding noted today.    *wound vac dressing applied, - 125 mmHg         Data:    Imaging:  reviewed recent imaging, no acute concerns    Labs:  BMP  Recent Labs   Lab 12/18/22  0736 12/18/22  0559 12/17/22  2146  12/17/22  1652 12/17/22  1201 12/17/22  0656 12/16/22  0642 12/16/22  0536 12/15/22  0837 12/15/22  0437   NA  --  129*  --   --   --  136  --  139  --  136   POTASSIUM  --  4.2  --   --   --  4.4  --  4.0  --  3.8   CHLORIDE  --  94*  --   --   --  98  --  99  --  98   CALOS  --  8.1*  --   --   --  8.1*  --  8.3*  --  7.8*   CO2  --  29  --   --   --  30*  --  29  --  30*   BUN  --  28.8*  --   --   --  30.1*  --  27.6*  --  27.7*   CR  --  1.42*  --   --   --  1.52*  --  1.45*  --  1.39*   * 97 181* 134*   < > 108*   < > 108*   < > 106*    < > = values in this interval not displayed.     CBC  Recent Labs   Lab 12/18/22  0559 12/17/22  1638 12/17/22  0656 12/16/22  2055 12/16/22  1337 12/16/22  0536 12/15/22  1319 12/15/22  0437   WBC 7.9  --  7.9  --   --  7.5  --  8.2   RBC 3.12*  --  3.08*  --   --  3.17*  --  3.45*   HGB 9.3* 9.5* 9.4* 9.6*   < > 9.5*   < > 10.2*   HCT 30.5*  --  30.1*  --   --  30.3*  --  32.3*   MCV 98  --  98  --   --  96  --  94   MCH 29.8  --  30.5  --   --  30.0  --  29.6   MCHC 30.5*  --  31.2*  --   --  31.4*  --  31.6   RDW 15.9*  --  15.8*  --   --  15.8*  --  15.9*     --  286  --   --  266  --  244    < > = values in this interval not displayed.     INR  Recent Labs   Lab 12/18/22  0559 12/17/22  0656 12/16/22  0536 12/15/22  0437   INR 1.22* 1.21* 1.33* 1.65*

## 2022-12-18 NOTE — PLAN OF CARE
"Neuro: A&Ox4.   Cardiac: Ventricular paced 60bpm. LVAD stable        Respiratory: RA   GI/: Voiding spontaneously. 1 BM this shift.   Diet/appetite: Tolerating 2 gram Na diet. Denies nausea   Activity: Up Independently     Pain: . Denies   Skin: see flowsheet, driveline wound vac in place.   Lines: Double lumen midline saline locked  Drains: Wound vac  Replacements: No replacement needed today  Pt has been resting comfortably throughout day, will continue to monitor and follow plan of care. Pt would like to get the antibiotic in the morning. It will be gradually moved to morning. Today, pharmacy miss placed the dose, so it was not here on time.   Problem: Plan of Care - These are the overarching goals to be used throughout the patient stay.    Goal: Plan of Care Review  Description: The Plan of Care Review/Shift note should be completed every shift.  The Outcome Evaluation is a brief statement about your assessment that the patient is improving, declining, or no change.  This information will be displayed automatically on your shift note.  Outcome: Progressing  Flowsheets (Taken 12/18/2022 1515)  Plan of Care Reviewed With: patient  Goal: Patient-Specific Goal (Individualized)  Description: You can add care plan individualizations to a care plan. Examples of Individualization might be:  \"Parent requests to be called daily at 9am for status\", \"I have a hard time hearing out of my right ear\", or \"Do not touch me to wake me up as it startles me\".  Outcome: Progressing  Goal: Absence of Hospital-Acquired Illness or Injury  Outcome: Progressing  Intervention: Identify and Manage Fall Risk  Recent Flowsheet Documentation  Taken 12/18/2022 0841 by Karley Bill RN  Safety Promotion/Fall Prevention:   clutter free environment maintained   assistive device/personal items within reach   nonskid shoes/slippers when out of bed  Intervention: Prevent Skin Injury  Recent Flowsheet Documentation  Taken 12/18/2022 0841 by Fly, " Karley LI RN  Body Position: position changed independently  Intervention: Prevent and Manage VTE (Venous Thromboembolism) Risk  Recent Flowsheet Documentation  Taken 12/18/2022 0841 by Karley Bill RN  VTE Prevention/Management: SCDs (sequential compression devices) off  Goal: Optimal Comfort and Wellbeing  Outcome: Progressing  Goal: Readiness for Transition of Care  Outcome: Progressing     Problem: Diabetes Comorbidity  Goal: Blood Glucose Level Within Targeted Range  Outcome: Progressing     Problem: Hypertension Comorbidity  Goal: Blood Pressure in Desired Range  Outcome: Progressing  Intervention: Maintain Blood Pressure Management  Recent Flowsheet Documentation  Taken 12/18/2022 0841 by Karley Bill RN  Medication Review/Management: medications reviewed     Problem: Ventricular Assist Device  Goal: Optimal Adjustment to Device  Outcome: Progressing  Goal: Absence of Bleeding  Outcome: Progressing  Goal: Absence of Embolism Signs and Symptoms  Outcome: Progressing  Intervention: Prevent or Manage Embolism  Recent Flowsheet Documentation  Taken 12/18/2022 0841 by Karley Bill RN  VTE Prevention/Management: SCDs (sequential compression devices) off  Goal: Optimal Blood Flow  Outcome: Progressing  Goal: Absence of Infection Signs and Symptoms  Outcome: Progressing  Goal: Effective Right-Sided Heart Function  Outcome: Progressing  Intervention: Manage Decreased Right Heart Function  Recent Flowsheet Documentation  Taken 12/18/2022 0841 by Karley Bill RN  Medication Review/Management: medications reviewed   Goal Outcome Evaluation:      Plan of Care Reviewed With: patient

## 2022-12-18 NOTE — PROGRESS NOTES
[facility]   Cardiology II Service / Advanced Heart Failure  Daily Progress Note      Patient: Eliseo Tanner  MRN: [id number]  Admission Date: 12/8/2022  Hospital Day # 10    Assessment and Plan: Eliseo Tanner is a 67 year old male with chronic systolic heart failure secondary to NICM (LVEF 15-20% per TTE 9/2022), s/p HM3 LVAD (2/18/2020, DT) c/b recurrent drive-line infections, moderate CAD, ABHINAV (on CPAP), DMII, HTN, CKD-III, and ANA who was admitted for an acute heart failure exacerbation and concerns for recurrent drive-line infection.    Today's Plan:  -resume aspirin tomorrow  -wound vac per CVTS    # Recurrent driveline infections, acute on chronic  # s/p abdominal wound I&D 12/11/22  # Acute blood loss anemia 2/2 above  # Wound culture at OSH +MRSA  H/o recurrent MSSA infections. on IV cefazolin 10/21-11/18 due to increased driveline drainage with +MSSA, which improved but did not completely resolve.  He was transitioned to po bactrim 11/19, but then developed increased purulent drainage 1 week later, prompting this admission.  Would cultures from OSH +S. Aureus, would cultures from admission also +S. Aureus. CT abdomen revealed fluid along driveline tract, increased from prior, but no organized abscess. S/p I&D per CVTS on 12/11. [place] wound culture from 12/12 growing MRSA (prior infections tetra-resistant MSSA-.  - ID and CVTS consulted, appreciate recs  - stopped andres and zosyn 12/9  - stopped vanco 12/11 and started cefazolin 2g IV q8 hours per ID  - NOTE: he does not have insurance coverage for home antibiotics, historically has been able to getinfusions at his local clinic (and ER on the weekends/holidays).  He would prefer to do this rather than SNF.  - ID reconsulted 12/17 for MRSA culture finding - Ancef changed to IV dapto q24hr  - ongoing bleeding/wound drainage requiring frequent packing   - s/p 1 unit FFP 12/14   - stitch placed 12/15 by CVTS, bleeding slowed   - [id number] given  drop in Hgb, Hgb stable mid 9s (baseline 12-12)   - resume warfarin 12/16 per CVTS, ok to resume aspirin     # Chronic systolic heart failure/HFrEF secondary to NICM   # s/p HM3 LVAD as DT on 2/2020   Stage D. NYHA Class II-III RHC 12/12/22: RA 9, mPA 22, PCW 13    - Fluid status: euvolemic, Bumex 5 mg bid (pta 60 mg bid, lower dose due to ongoing bleeding)  - ACEi/ARB/ARNi: deferred, Entresto caused BERNARDA per notes  - Afterload reduction: hydralazine 100 mg tid and Imdur 90 mg daily  - nBB contraindicated due to bradycardia and RV failure per prior notes  - Aldosterone antagonist deferred  - SGLT2i: Farxiga 5 mg daily - consider increase to 10 mg daily   - SCD prophylaxis ICD  - Antiplatelet:  aspirin 81mg daily resume tomorrosw  - Anticoagulation: warfarin per pharmacy goal INR 1.7-2.3 (per [facility] notes). INR today 1.2, warfarin held since 12/13, s/p 1 unit FFP 12/14, resumed 12/16  - MAPs: 70s-80s  - LDH:  364 (12/10)  - Remote monitoring: CardioMEMs has home pillow, will get readin today    # HTN  - continue hydral, imdur, plus amlodipine 10mg daily     # Hypokalemia, diuretic induced, resolved  - replete K per protocol  - continue to monitor     # Hepatic injury, likely cholestatic, R factor 1  Found to have elevated , , Alk Phos 330. Likely predominantly cholestatic given R factor 1. Possibly related to congestion. CT with chloeithiasis without cholecysitits.   - PTA atorvastatin has been held, resumed 12/17, monitor  - RUQ ultrasound if not improving with diuresis      # CKD III  Baseline Cr 1.6-1.9 GFR low 40s.   - Cr stable/below baseline     # Atrial fibrillation  # Slow VT  [id number] 4. History of afib w/ RVR and aberrancy vs. skilled nursing vs. AT vs. Slow VT.  S/p DCCV on 2/28/22, back into sinus rhythm.  - continue pta amiodarone 200mg daily     # CAD  Mild to moderate CAD with severe branch disease per angiogram 11/2019.   - ASA, statin as above  - BB deferred as above     # Hypothyroidism   Last TSH  5.16 on 10/18/22, 6.11 with FT4 of 1.19 this admission. On amiodarone as above   - continue levothyroxine 50mcg daily     # DM II  A1c 6.3 on 9/2/22.  - continue dapagliflozin  - Low-intensity sliding scale     # Insomnia - pta zolpidem  # BPH, urinary retention - pta finasteride, tamsulosin   # GERD - pta PPI  # Gout - no recent flares, allopurinol discontinued in the past   # IgG Kappa monoclonal gammopathy of undetermined significance - followed by heme/onc outpt     FEN: 2g Na 2L FR  PROPHY:  Therapeutic INR, PPI  LINES:  PIV   DISPO:  Pending Hgb and wound/bleeding stability  CODE STATUS:  Full code    Siena Aguiar, CORY, NP-C  Advanced Heart Failure/Cardiology II Service  Pager [phone] [facility] [id number]    ================================================================    Subjective/24-Hr Events:   Last 24 hr care team notes reviewed. Wound vac placed this morning. No new complaints or concerns. Denies LE edema, orthopnea, PND. No feers or abd pain.     ROS:  4 point ROS including respiratory, CV, GI and  (other than that noted in the HPI) is negative.     Medications: Reviewed in EPIC.     Physical Exam:   /86 (BP Location: Right arm)   Pulse 57   Temp 98.1  F (36.7  C) (Oral)   Resp 20   Wt 80.7 kg (177 lb 14.4 oz)   SpO2 97%   BMI 27.37 kg/m      GENERAL: Appears comfortable, in no distress.  HEENT: Eye symmetrical, no discharge or icterus bilaterally. Mucous membranes moist and without lesions.  NECK: Supple, JVD below clavicle at 90 degrees.   CV: +mechanical LVAD hum.   RESPIRATORY: Respirations regular, even, and unlabored. Lungs CTA throughout.    GI: Soft and non distended with normoactive bowel sounds present in all quadrants. No tenderness, rebound, guarding.   EXTREMITIES: No peripheral edema. 2+ bilateral pedal pulses.   NEUROLOGIC: Alert and oriented x 3. No focal deficits.   MUSCULOSKELETAL: No joint swelling or tenderness.   SKIN: No jaundice. No rashes or lesions.      Labs:  [facility]  Recent Labs   Lab 12/18/22  0736 12/18/22  0559 12/17/22  2146 12/17/22  1652 12/17/22  1638 12/17/22  1201 12/17/22  0656 12/16/22  1613 12/16/22  1544 12/16/22  0642 12/16/22  0536 12/15/22  0837 12/15/22  0437   NA  --  129*  --   --   --   --  136  --   --   --  139  --  136   POTASSIUM  --  4.2  --   --   --   --  4.4  --   --   --  4.0  --  3.8   CHLORIDE  --  94*  --   --   --   --  98  --   --   --  99  --  98   CO2  --  29  --   --   --   --  30*  --   --   --  29  --  30*   ANIONGAP  --  6*  --   --   --   --  8  --   --   --  11  --  8   * 97 181* 134*  --    < > 108*   < >  --    < > 108*   < > 106*   BUN  --  28.8*  --   --   --   --  30.1*  --   --   --  27.6*  --  27.7*   CR  --  1.42*  --   --   --   --  1.52*  --   --   --  1.45*  --  1.39*   GFRESTIMATED  --  53*  --   --   --   --  49*  --   --   --  52*  --  55*   CALOS  --  8.1*  --   --   --   --  8.1*  --   --   --  8.3*  --  7.8*   MAG  --  2.1  --   --  2.0  --  2.2  --  2.0  --  1.9   < > 1.9   PROTTOTAL  --  6.3*  --   --   --   --  6.3*  --   --   --  6.4  --  6.2*   ALBUMIN  --  3.0*  --   --   --   --  2.9*  --   --   --  3.0*  --  2.8*   BILITOTAL  --  0.4  --   --   --   --  0.4  --   --   --  0.4  --  0.4   ALKPHOS  --  277*  --   --   --   --  260*  --   --   --  245*  --  234*   AST  --  149*  --   --   --   --  132*  --   --   --  120*  --  96*   ALT  --  51*  --   --   --   --  48  --   --   --  44  --  34    < > = values in this interval not displayed.       CBC  Recent Labs   Lab 12/18/22  0559 12/17/22  1638 12/17/22  0656 12/16/22  2055 12/16/22  1337 12/16/22  0536 12/15/22  1319 12/15/22  0437   WBC 7.9  --  7.9  --   --  7.5  --  8.2   RBC 3.12*  --  3.08*  --   --  3.17*  --  3.45*   HGB 9.3* 9.5* 9.4* 9.6*   < > 9.5*   < > 10.2*   HCT 30.5*  --  30.1*  --   --  30.3*  --  32.3*   MCV 98  --  98  --   --  96  --  94   MCH 29.8  --  30.5  --   --  30.0  --  29.6   MCHC 30.5*  --  31.2*  --   --  31.4*   --  31.6   RDW 15.9*  --  15.8*  --   --  15.8*  --  15.9*     --  286  --   --  266  --  244    < > = values in this interval not displayed.       INR  Recent Labs   Lab 12/18/22  0559 12/17/22  0656 12/16/22  0536 12/15/22  0437   INR 1.22* 1.21* 1.33* 1.65*       Time/Communication  I personally spent a total of 35 minutes. Plan of care discussed with patient. See my note above for details.    Patient discussed with Dr. Sotelo.

## 2022-12-18 NOTE — PROGRESS NOTES
6C shift note [id number]    Neuro: A&Ox4. [place]. PRN Ambien at bedtime for sleep  Cardiac: V paced rate 60's. LVAD stable no alarms.   Respiratory: Sating >92% on RA.  GI/: Adequate urine output. LBM 12/17  Diet/appetite: Tolerating 2g Na diet. Eating well.  Activity:  Independent  Pain: Denies pain   Skin: No new deficits noted. Driveline dressing CDI.  LDA's: L arm DL midline SL.    Plan: Continue with POC. Notify primary team with changes.

## 2022-12-19 NOTE — PROGRESS NOTES
Care Management Follow Up    Length of Stay (days): 11    Expected Discharge Date: 12/17/2022     Concerns to be Addressed: Discharge planning  Patient plan of care discussed at interdisciplinary rounds: Yes    Anticipated Discharge Disposition: Home     Anticipated Discharge Services: Outpatient infusion and wound vac dressing changes  Anticipated Discharge DME: Home wound vac      Education Provided on the Discharge Plan: Yes   Patient/Family in Agreement with the Plan: Yes    Referrals Placed by CM/SW: Outpatient Infusion    Additional Information:  Per NP, pt medically ready for discharge home tomorrow. NP updated pt's wife who is planning to come pick pt up tomorrow. Home wound vac approved, called down and requested it be brought up to pt's room today. Pt now transitioned to daily IV daptomycin infusions, this will be the plan upon discharge.    This writer called and updated [place], pharmacist at [facility]. She will get pt scheduled for daily infusion starting 12/21 at 10am and wound vac dressing changes starting this Friday 12/23 as our PAs will change tomorrow prior to discharge. Faxed initial dapto script and orders.     [facility] [place]    Phone: [phone]   Fax: [phone]     For daily IV daptomycin infusions, starting on Wednesday 12/21 at 10am.     Plan for at least 4 week course from date of transition to daptomycin (12/17/22), will need to see ID prior to stopping antibiotics.   Weekly labs: CBC w/ diff, CMP, CK   Fax results to: [facility] [phone]     ID follow up in 3-4 weeks (to be seen prior to stopping IV abx) to discuss PO regimen     Wound VAC dressing changes   Frequency: Mondays, Wednesdays and Fridays starting on Friday 12/23. They will coordinate this with your infusion time.   Wound vac pressure setting: -125mmhg     CC will continue to monitor patient's medical condition and progress towards discharge.  Carmita Farnsworth RN  [facility]  6C Unit Care Coordinator  Phone number: [phone]  Pager: [phone]

## 2022-12-19 NOTE — PROGRESS NOTES
Cardiovascular Surgery Progress Note  12/19/2022         Assessment and Plan:     Eliseo Tanner is a 67 year old male with chronic systolic heart failure secondary to NICM (LVEF 15-20% per TTE 9/2022), s/p HM3 LVAD (2/18/2020, DT) c/b recurrent drive-line infections, moderate CAD, ABHINAV (on CPAP), DMII, HTN, CKD-III, and ANA who was admitted for an acute heart failure exacerbation and concerns for recurrent drive-line infection.  S/P I&D of driveline site on 12/11/22 with Dr Mac Jaramillo.      - Had persistent bleeding from wound bed in multiple places through 12/16. Recommended to hold Coumadin through 12/15 to aid in clot formation.  - 1 unit Plasma 12/14 for bleeding with INR 1.86. INR 1.22 today. Hgb 9.3 (stable).   - QuickClot gauze in place 12/16 am but now without pressure dressing. OK to change dressing 12/17 pm shift, replace quickclot gauze with saline moistened kerlix (wet to dry).  - Wait for Vac dressing until bleeding has stopped.    Discussed with Dr Jaramillo through written and verbal communication.      Diego Zuñiga PA-C  Cardiothoracic Surgery  Pager [phone]    1:59 PM   December 19, 2022        Interval History:     No overnight events.  Should be able to discharge tomorrow with Home wound vac.          Physical Exam:   Blood pressure 91/67, pulse 60, temperature 98.6  F (37  C), temperature source Oral, resp. rate 18, weight 80.2 kg (176 lb 12.8 oz), SpO2 96 %.  Vitals:    12/17/22 0430 12/18/22 0425 12/19/22 0405   Weight: 80.1 kg (176 lb 8 oz) 80.7 kg (177 lb 14.4 oz) 80.2 kg (176 lb 12.8 oz)      Gen: A&Ox4, NAD  Neuro: no focal deficits   CV: HD stable w VAD  Pulm: normal breathing on RA  Incision: vacuum dressing suction intact           Data:    Imaging:  reviewed recent imaging, no acute concerns    Labs:  BMP  Recent Labs   Lab 12/19/22  1125 12/19/22  0739 12/19/22  0458 12/18/22  2136 12/18/22  0736 12/18/22  0559 12/17/22  1201 12/17/22  0656 12/16/22  0642 12/16/22  0536   NA   --   --  134*  --   --  129*  --  136  --  139   POTASSIUM  --   --  4.1  --   --  4.2  --  4.4  --  4.0   CHLORIDE  --   --  99  --   --  94*  --  98  --  99   CALOS  --   --  8.4*  --   --  8.1*  --  8.1*  --  8.3*   CO2  --   --  27  --   --  29  --  30*  --  29   BUN  --   --  26.6*  --   --  28.8*  --  30.1*  --  27.6*   CR  --   --  1.35*  --   --  1.42*  --  1.52*  --  1.45*   * 108* 96 124*   < > 97   < > 108*   < > 108*    < > = values in this interval not displayed.     CBC  Recent Labs   Lab 12/19/22  0458 12/18/22  1744 12/18/22  0559 12/17/22  1638 12/17/22  0656 12/16/22  1337 12/16/22  0536   WBC 8.0  --  7.9  --  7.9  --  7.5   RBC 3.07*  --  3.12*  --  3.08*  --  3.17*   HGB 9.3* 9.1* 9.3* 9.5* 9.4*   < > 9.5*   HCT 29.5*  --  30.5*  --  30.1*  --  30.3*   MCV 96  --  98  --  98  --  96   MCH 30.3  --  29.8  --  30.5  --  30.0   MCHC 31.5  --  30.5*  --  31.2*  --  31.4*   RDW 15.7*  --  15.9*  --  15.8*  --  15.8*     --  293  --  286  --  266    < > = values in this interval not displayed.      25-Apr-2022 09:34

## 2022-12-19 NOTE — PROGRESS NOTES
Neuro: A&Ox4.   Cardiac: V paced 60, MAPs WDL. LVAD stable no alarms.  Respiratory: Sating >92% on RA.  GI/: Adequate urine output. LBM yesterday  Diet/appetite: Tolerating 2g Na diet. Eating well.  Activity:  Independent  Pain: denies pain  Skin: Wound vac to Driveline site to -125mmHg suction continuous.   LDA's: L DL Midline SL    Plan: Continue with POC. Notify primary team with changes.

## 2022-12-19 NOTE — PROGRESS NOTES
[facility]   Cardiology II Service / Advanced Heart Failure  Daily Progress Note      Patient: Eliseo Tanner  MRN: [id number]  Admission Date: 12/8/2022  Hospital Day # 11    Assessment and Plan: Eliseo Tanner is a 67 year old male with chronic systolic heart failure secondary to NICM (LVEF 15-20% per TTE 9/2022), s/p HM3 LVAD (2/18/2020, DT) c/b recurrent drive-line infections, moderate CAD, ABHINAV (on CPAP), DMII, HTN, CKD-III, and ANA who was admitted for an acute heart failure exacerbation and concerns for recurrent drive-line infection.    Today's Plan:  -continue current Bumex dose  -wound vac management per CVTS  -possible discharge tomorrow     # Recurrent driveline infections, acute on chronic  # s/p abdominal wound I&D 12/11/22  # Acute blood loss anemia 2/2 above  # Wound culture at OSH +MRSA  H/o recurrent MSSA infections. on IV cefazolin 10/21-11/18 due to increased driveline drainage with +MSSA, which improved but did not completely resolve.  He was transitioned to po bactrim 11/19, but then developed increased purulent drainage 1 week later, prompting this admission.  Would cultures from OSH +S. Aureus, would cultures from admission also +S. Aureus. CT abdomen revealed fluid along driveline tract, increased from prior, but no organized abscess. S/p I&D per CVTS on 12/11. Gem wound culture from 12/12 growing MRSA (prior infections tetra-resistant MSSA-.  - ID and CVTS following  - stopped andres and zosyn 12/9, stopped vanco 12/11, started cefazolin 2g IV q8 hours per ID  - NOTE: he does not have insurance coverage for home antibiotics, historically has been able to getinfusions at his local clinic (and ER on the weekends/holidays). He would prefer to do this rather than SNF.  - culture from OSH 12/7/22 +MRSA, Ancef changed to IV dapto q24hr (plan for four week course starting 12/17)  - ongoing bleeding/wound drainage requiring frequent packing   - s/p 1 unit FFP 12/14   - stitch placed  12/15 by CVTS, bleeding slowed   - [id number] given drop in Hgb, Hgb stable mid 9s (baseline 12-12), change to daily   - resume warfarin 12/16, aspirin 12/19 - ok per CVTS  - wound vac placed 12/18, CVTS to discuss antibiotic beads prior to d.c     # Chronic systolic heart failure/HFrEF secondary to NICM   # s/p HM3 LVAD as DT on 2/2020   Stage D. NYHA Class II-III. RHC 12/12/22: RA 9, mPA 22, PCW 13    - Fluid status: euvolemic, Bumex 5 mg bid (pta 6 mg bid, lower dose due to recent bleeding)  - ACEi/ARB/ARNi: deferred, Entresto caused BERNARDA per notes  - Afterload reduction: hydralazine 100 mg tid, Imdur 90 mg daily  - BB contraindicated due to bradycardia and RV failure per prior notes  - Aldosterone antagonist deferred  - SGLT2i: Farxiga 5 mg daily - consider increase to 10 mg daily   - SCD prophylaxis: ICD  - Antiplatelet:  aspirin 81mg daily resumed  - Anticoagulation: warfarin per pharmacy goal INR 1.7-2.3 (per [facility] notes). INR today 1.2, warfarin held since 12/13, s/p 1 unit FFP 12/14, resumed 12/16, defer bridging given recent bleeding   - MAPs:70s-80s  - LDH: 364 (12/10)  - Remote monitoring: CardioMEMs has home pillow, will get reading today    # HTN  - continue hydral, imdur, plus amlodipine 10mg daily     # Hypokalemia, diuretic induced, resolved  - replete K per protocol  - continue to monitor     # Hepatic injury, likely cholestatic, R factor 1  Found to have elevated , , Alk Phos 330. Likely predominantly cholestatic given R factor 1. Possibly related to congestion. CT with chloeithiasis without cholecysitits.   - PTA atorvastatin has been held, hold again for rising LFTs  - consider RUQ ultrasound if not improved     # CKD III  Baseline Cr 1.6-1.9 GFR low 40s.   - Cr stable/below baseline     # Atrial fibrillation  # Slow VT  [id number] 4. History of afib w/ RVR and aberrancy vs. [place] vs. AT vs. Slow VT.  S/p DCCV on 2/28/22, back into sinus rhythm.  - continue pta amiodarone 200mg daily      # CAD  Mild to moderate CAD with severe branch disease per angiogram 11/2019.   - ASA, statin as above  - BB deferred as above     # Hypothyroidism   Last TSH 5.16 on 10/18/22, 6.11 with FT4 of 1.19 this admission. On amiodarone as above   - continue levothyroxine 50mcg daily     # DM II  A1c 6.3 on 9/2/22.  - continue dapagliflozin  - low-intensity sliding scale     # Insomnia - pta zolpidem  # BPH, urinary retention - pta finasteride, tamsulosin   # GERD - pta PPI  # Gout - no recent flares, allopurinol discontinued in the past   # IgG Kappa monoclonal gammopathy of undetermined significance - followed by heme/onc outpt     FEN: 2g Na 2L FR  PROPHY:  Therapeutic INR, PPI  LINES:  PIV   DISPO:  Pending wound plan  CODE STATUS:  Full code    Siena Aguiar DNP, NP-C  Advanced Heart Failure/Cardiology II Service  Pager [phone] [facility] [id number]    ================================================================    Subjective/24-Hr Events:   Last 24 hr care team notes reviewed. Wound vac placed yesterday. Denies pain, fever, chills, nausea, shortness of breath/ VAD parameters stable. No new concerns.     ROS:  4 point ROS including respiratory, CV, GI and  (other than that noted in the HPI) is negative.     Medications: Reviewed in EPIC.     Physical Exam:   /81 (BP Location: Right arm)   Pulse 61   Temp 98.2  F (36.8  C) (Oral)   Resp 20   Wt 80.2 kg (176 lb 12.8 oz)   SpO2 98%   BMI 27.20 kg/m      GENERAL: Appears comfortable, in no distress.  HEENT: Eye symmetrical, no discharge or icterus bilaterally. Mucous membranes moist and without lesions.  NECK: Supple, JVD below clavicle at 90 degrees.   CV: +mechanical LVAD hum.   RESPIRATORY: Respirations regular, even, and unlabored. Lungs CTA throughout.    GI: Soft and non distended with normoactive bowel sounds present in all quadrants. No tenderness, rebound, guarding.   EXTREMITIES: No peripheral edema. 2+ bilateral pedal pulses.   NEUROLOGIC: Alert and  oriented x 3. No focal deficits.   MUSCULOSKELETAL: No joint swelling or tenderness.   SKIN: No jaundice. No rashes or lesions.     Labs:  CMP  Recent Labs   Lab 12/19/22  0739 12/19/22  0458 12/18/22  2136 12/18/22  1744 12/18/22  1606 12/18/22  0736 12/18/22  0559 12/17/22  1652 12/17/22  1638 12/17/22  1201 12/17/22  0656 12/16/22  0642 12/16/22  0536   NA  --  134*  --   --   --   --  129*  --   --   --  136  --  139   POTASSIUM  --  4.1  --   --   --   --  4.2  --   --   --  4.4  --  4.0   CHLORIDE  --  99  --   --   --   --  94*  --   --   --  98  --  99   CO2  --  27  --   --   --   --  29  --   --   --  30*  --  29   ANIONGAP  --  8  --   --   --   --  6*  --   --   --  8  --  11   * 96 124*  --  116*   < > 97   < >  --    < > 108*   < > 108*   BUN  --  26.6*  --   --   --   --  28.8*  --   --   --  30.1*  --  27.6*   CR  --  1.35*  --   --   --   --  1.42*  --   --   --  1.52*  --  1.45*   GFRESTIMATED  --  57*  --   --   --   --  53*  --   --   --  49*  --  52*   CALOS  --  8.4*  --   --   --   --  8.1*  --   --   --  8.1*  --  8.3*   MAG  --  2.0  --  1.8  --   --  2.1  --  2.0  --  2.2   < > 1.9   PROTTOTAL  --  6.2*  --   --   --   --  6.3*  --   --   --  6.3*  --  6.4   ALBUMIN  --  3.0*  --   --   --   --  3.0*  --   --   --  2.9*  --  3.0*   BILITOTAL  --  0.4  --   --   --   --  0.4  --   --   --  0.4  --  0.4   ALKPHOS  --  278*  --   --   --   --  277*  --   --   --  260*  --  245*   AST  --  158*  --   --   --   --  149*  --   --   --  132*  --  120*   ALT  --  69*  --   --   --   --  51*  --   --   --  48  --  44    < > = values in this interval not displayed.       CBC  Recent Labs   Lab 12/19/22  0458 12/18/22  1744 12/18/22  0559 12/17/22  1638 12/17/22  0656 12/16/22  1337 12/16/22  0536   WBC 8.0  --  7.9  --  7.9  --  7.5   RBC 3.07*  --  3.12*  --  3.08*  --  3.17*   HGB 9.3* 9.1* 9.3* 9.5* 9.4*   < > 9.5*   HCT 29.5*  --  30.5*  --  30.1*  --  30.3*   MCV 96  --  98  --  98  --  96    MCH 30.3  --  29.8  --  30.5  --  30.0   MCHC 31.5  --  30.5*  --  31.2*  --  31.4*   RDW 15.7*  --  15.9*  --  15.8*  --  15.8*     --  293  --  286  --  266    < > = values in this interval not displayed.       INR  Recent Labs   Lab 12/19/22  0458 12/18/22  0559 12/17/22  0656 12/16/22  0536   INR 1.22* 1.22* 1.21* 1.33*       Time/Communication  I personally spent a total of 35 minutes. Plan of care discussed with patient. See my note above for details.    Patient discussed with Dr. Sotelo.

## 2022-12-19 NOTE — PLAN OF CARE
67 year old male with chronic systolic heart failure secondary to NICM (LVEF 15-20% per TTE 9/2022), s/p HM3 LVAD (2/18/2020, DT) c/b recurrent drive-line infections, moderate CAD, AHBINAV (on CPAP), DMII, HTN, CKD-III, and ANA who was admitted for an acute heart failure exacerbation and concerns for recurrent drive-line infection.  S/P I&D of driveline site on 12/11/22 with Dr Mac Jaramillo.     Shift [id number]  Neuro:  AOx4, pleasant and cooperative. Calls appropriately  Resp: RA >92%. Denies SOB, LS clear and equal bilaterally. No cough reported  Cardiac: V-paced 60, VSS. MAP's WDL, no LVAD alarms.  GI/: 2g Na diet tolerating well. BG ACHS, no insulin given at dinner. Voiding independently via urinal, good UOP during day. BM+ today.  Skin: Wound vac to driveline site 125 continuous, transparent dressing CDI. No new deficits noted  Pain: Denies  Activity: Up ad tiffanie in room  LDAs: L DL Midline SL and WDL    Will continue to monitor and report changes to team.

## 2022-12-19 NOTE — PROCEDURES
The patient's [employer] LVAD was interrogated 12/19/2022  * Speed 5800 rpm   * Pulsatility index 2.8-3.5  * Power 4.8 Driver   * Flow 5 L/minute   Fluid status: euvolemic   Alarms were reviewed, and notable for few PI events.  The driveline exit site was inspected, pressures dressing cdi.   All external components were inspected and showed no evidence of damage or malfunction, none replaced.   No changes to VAD settings made

## 2022-12-20 NOTE — DISCHARGE SUMMARY
[facility]   Cardiology II Service / Advanced Heart Failure  Discharge Summary     Eliseo Tanner MRN# [id number]   YOB: 1955 Age: 67 year old     DATE OF ADMISSION:  12/8/2022  DATE OF DISCHARGE:  12/20/2022  ADMITTING PROVIDER:  Jan Jiang MD  DISCHARGE PROVIDER:  Laine LEMUS/Dr. Sotelo  PRIMARY PROVIDER:   Jay Steele    ADMIT DIAGNOSES:   1. 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 S/p abdominal wound I  & D 12/11/22  3. Acute blood loss anemia secondary to abdominal wound    DISCHARGE DIAGNOSES:   1. Chronic SCHF secondary to NICM s/p HM III LVAD  2. HTN  3. CKD Stage III  4. Afib  5. Slow VT  6. CAD  7. Insomnia  8. GERD  9. BPH  10. Gout  11. IgG Kappa monoclonal gammopathy of undetermined significance     HPI: Please see the detailed H & P by Dr. Jiang from 12/8/2022. Eliseo Tanner is a 67-year-old man with systolic heart failure 2/2 NICM s/p destination HM-III LVAD (2/18/2020) c/b recurrent drive-line infections, moderate CAD, ABHINAV on CPAP, T2DM, HTN, CKD-III, ANA, who is admitted for heart failure exacerbation and recurrent drive-line infection.     Patient is admitted from clinic, where he had been seen for a pre-planned LVAD management follow-up visit.  Purulent drainage was noted at the driveline site and patient was advised to present to the [facility] ED.     Patient was admitted in September 2022 for heart failure exacerbation and was diuresed to a new dry weight of 178 pounds from 218. He had been well since discharge, though he has noted an increase in his weight since Thanksgiving. He also reports increased abdominal girth and lower extremity edema. Otherwise, patient does not believe he has been worse.  He denies chest pain, shortness of breath, palpitations, and LVAD alarms.     In the ED, K 3.9, Cr 1.78, , , Alk Phos 330. Blood and driveline Cx were sent, and patient was admitted to Cards 2.    PHYSICAL EXAM:  Blood  pressure 102/74, pulse 60, temperature 98.1  F (36.7  C), temperature source Oral, resp. rate 16, weight 79.9 kg (176 lb 1.6 oz), SpO2 98 %.  GENERAL: Appears alert and oriented times three.   HEENT: Eye symmetrical and free of discharge bilaterally. Mucous membranes moist and without lesions.  NECK: Supple and without lymphadenopathy. JVD mid neck.   CV: RRR, S1S2 present with LVAD hum.   RESPIRATORY: Respirations regular, even, and unlabored. Lungs CTA throughout.   GI: Soft and non distended with normoactive bowel sounds present in all quadrants. No tenderness, rebound, guarding. No organomegaly.   EXTREMITIES: No peripheral edema. 2+ bilateral pedal pulses.   NEUROLOGIC: Alert and orientated x 3. CN II-XII grossly intact. No focal deficits.   MUSCULOSKELETAL: No joint swelling or tenderness.   SKIN: No jaundice. No rashes or lesions.     LABS:   Last CBC:   Recent Labs   Lab Test 12/20/22  0408   WBC 7.5   RBC 3.16*   HGB 9.2*   HCT 29.8*   MCV 94   MCH 29.1   MCHC 30.9*   RDW 15.5*          Last CMP:  Recent Labs   Lab Test 12/20/22  1135 12/20/22  0731 12/20/22  0408   NA  --   --  136   POTASSIUM  --   --  4.0   CHLORIDE  --   --  98   CALOS  --   --  8.5*   CO2  --   --  29   BUN  --   --  25.6*   CR  --   --  1.35*   *   < > 96   AST  --   --  159*   ALT  --   --  81*   BILITOTAL  --   --  0.4   ALBUMIN  --   --  3.1*   PROTTOTAL  --   --  6.4   ALKPHOS  --   --  285*    < > = values in this interval not displayed.       IMAGING:  Results for orders placed or performed during the hospital encounter of 12/08/22   US Abdomen Limited w Doppler Complete    Narrative    EXAMINATION: US ABDOMEN COMPLETE,  12/8/2022 6:47 PM     COMPARISON: 2/18/2021    HISTORY: Heart failure, concern for ascites.    TECHNIQUE: The right upper quadrant abdomen was scanned in standard  fashion with specialized ultrasound transducer(s) using both  gray-scale and limited color Doppler techniques.    FINDINGS:  Liver:  Limited view of the left hepatic lobe due to overlying LVAD.  Visualized Liver demonstrates normal homogeneous echotexture. No  evidence of a focal hepatic mass. The main portal vein is patent with  antegrade flow, measuring 56 m/s. MPD measures 1.2 cm in diameter    Hepatic arteries are patent without elevated peak systolic velocities  or resistive indices.    Gallbladder: Gallbladder is decompressed with mild diffuse symmetric  wall thickening to 5 mm. There is a 4 mm avascular cholesterol polyp  and small amount of layering biliary sludge.    Bile Ducts: Visualized intrahepatic biliary system is normal caliber.    Pancreas: Visualized portions of the head and body of the pancreas are  unremarkable.     Right kidney: normal echotexture, without mass or hydronephrosis.    craniocaudal dimension: 9.9 cm    Aorta and IVC: The visualized portions of the aorta and IVC are  unremarkable.     Fluid: No evidence of ascites or pleural effusions.    Visualized aorta and IVC are within normal limits.       Impression    IMPRESSION:   1.  Normal grayscale and Doppler evaluation of the liver, as well as  portal and hepatic vasculature.  2.  Decompressed gallbladder without evidence of acute cholecystitis.  No evidence of cholestatic disease process. There is a 3 mm  gallbladder cholesterol polyp which does not require follow-up.     I have personally reviewed the examination and initial interpretation  and I agree with the findings.    QUIQUE NICHOLS MD         SYSTEM ID:  [id number]   CT Chest Abdomen Pelvis w/o Contrast    Narrative    EXAMINATION: CT CHEST ABDOMEN PELVIS W/O CONTRAST, 12/8/2022 6:55 PM    INDICATION: here with suspected driveline infection; r/o empyema,  abscess    COMPARISON STUDY: CT CAP 2/16/2021  Exhibits  TECHNIQUE: CT scan of the chest, abdomen and pelvis was performed on  multidetector CT scanner using volumetric acquisition technique and  images were reconstructed in multiple planes with variable  thickness  and reviewed on dedicated workstations.     CT scan radiation dose is optimized to minimum requisite dose using  automated dose modulation techniques.    FINDINGS:  Chest:   Mediastinum: Right chest wall implantable cardiac defibrillator with  leads in the right ventricle. Stable position of LVAD and right  anterior drive line. Small amount of fluid surrounding Drive line  within the right anterior chest subcutaneous tissues. Stable trace  fluid surrounding the LVAD outflow track in the anterior mediastinum.  No organized fluid collection.    Normal heart size. No pericardial effusion. Moderate to severe  coronary artery calcifications. Normal caliber of the aorta and main  pulmonary artery. Multiple prominent mediastinal lymph nodes. No  pathologically enlarged mediastinal, hilar or axillary lymph nodes.    Pleura: No pleural effusion or thickening.    Lungs: Central tracheobronchial tree is patent. Scattered sub-6 mm  pulmonary nodules. For example 2 mm pulmonary nodule in the right  upper lobe (series 3 image 75). No suspicious pulmonary nodules. No  focal consolidation or mass.     Abdomen and Pelvis:  Liver: No mass. Mild intrahepatic biliary ductal dilation.    Biliary System: Cholelithiasis without evidence of cholecystitis. No  extrahepatic biliary ductal dilation.    Pancreas: Diffuse fatty atrophy of the pancreas. No mass or pancreatic  ductal dilation.    Adrenal glands: No mass or nodules    Spleen: Normal.    Kidneys: No suspicious mass, obstructing calculus or hydronephrosis.    Gastrointestinal tract: Normal appendix. Normal caliber large and  small bowel.  No evidence of obstruction.    Mesentery/peritoneum/retroperitoneum: Trace perihepatic ascites. No  mass or free air.    Lymph nodes: Enlarged left inguinal lymph nodes measuring up to 1.3  cm.    Vasculature: No infrarenal aortic aneurysm.    Pelvis: Posterior bladder wall diverticula. Otherwise bladder within  normal limits.    Osseous  structures: No aggressive or acute osseous lesion.  Multilevel  degenerative changes of the visualized spine. Stable grade 1  anterolisthesis of L3 over L4 and L4 over L5.      Soft tissues: Fat containing left inguinal hernia.      Impression    IMPRESSION:   1. LVAD drive line with surrounding fluid in the along the tract in  the subcutaneous right anterior chest wall increased from previous.  Trace fluid surrounding the LVAD outflow tract , similar to prior. No  organized fluid collection or significant associated inflammatory  changes.  2. Left inguinal lymphadenopathy and stable prominent mediastinal  lymph nodes, likely reactive  3. Cholelithiasis without evidence of cholecystitis. Mild intrahepatic  biliary ductal dilation.  4. Trace ascites    I have personally reviewed the examination and initial interpretation  and I agree with the findings.    QUIQUE NICHOLS MD         SYSTEM ID:  [id number]   Echo Limited     Value    LVEF  10-15% (severely reduced)    Narrative    [id number]  [id number]  [id number]  [id number]^KATERINA^VERONICA     [facility],[place]  Echocardiography Laboratory  [address] [place] [id number]     Name: WOLFGANG LEACH  MRN: [id number]  : 1955  Study Date: 12/10/2022 07:54 AM  Age: 67 yrs  Gender: Male  Patient Location: [facility]  Reason For Study: Heart Failure  Ordering Physician: VERONICA BORGES  Referring Physician: BRANNON GAVIN  Performed By: Beau Phan RDCS     BSA: 2.0 m2  Height: 67 in  Weight: 184 lb  HR: 61  BP: 87/79 mmHg  ______________________________________________________________________________  Procedure  Limited Portable Echo Adult. LVAD Echocardiogram with two-dimensional, color  and spectral Doppler performed.  ______________________________________________________________________________  Interpretation Summary  HM3 LVAD at 5800 rpm.  Left ventricular function is decreased. The ejection fraction is 10-15%  (severely reduced). Severe  diffuse hypokinesis is present. The LVAD inflow  cannula is seen in the apex. It is not approximated to the septum. The  interventricular septum is deviated slightly towards the LV. The inflow  cannula velocities are somewhat low (40 cm/s) but outflow cannula velocities  are normal (120 cm/s).  Moderate right ventricular dilation is present. Global right ventricular  function is moderately reduced.  Mild to moderate mitral and tricuspid insufficiency are present.  The AV appears to open with each beat. There is mild-moderate aortic  regurgitation.  The estimated PA systolic pressure is at least 42 mmHg.  IVC diameter >2.1 cm collapsing <50% with sniff suggests a high RA pressure  estimated at 15 mmHg or greater.  This study was compared with the study from 09/08/2022. No significant changes  noted.  ______________________________________________________________________________  Left Ventricle  Left ventricular wall thickness is normal. Left ventricular size is normal.  LVEDD 4.7 cm. Left ventricular function is decreased. The ejection fraction is  10-15% (severely reduced). Diastolic function not assessed due to presence of  LVAD. Severe diffuse hypokinesis is present. Abnormal septal motion consistent  with left bundle branch block is present. The LVAD inflow cannula is seen in  the apex. It is not approximated to the septum. The interventricular septum is  deviated slightly towards the LV. The inflow cannula velocities are somewhat  low (40 cm/s) but outflow cannula velocities are normal (120 cm/s).     Right Ventricle  Moderate right ventricular dilation is present. Global right ventricular  function is moderately reduced. A pacemaker lead is noted in the right  ventricle.     Mitral Valve  Mild to moderate mitral insufficiency is present.     Aortic Valve  The AV appears to open with each beat. There is mild-moderate aortic  regurgitation.     Tricuspid Valve  Mild to moderate tricuspid insufficiency is present.  The right ventricular  systolic pressure is approximated at 26.6 mmHg plus the right atrial pressure.     Pulmonic Valve  Mild pulmonic insufficiency is present.     Vessels  The aorta root is normal. The thoracic aorta cannot be assessed. IVC diameter  >2.1 cm collapsing <50% with sniff suggests a high RA pressure estimated at 15  mmHg or greater.     Pericardium  No pericardial effusion is present.     Compared to Previous Study  This study was compared with the study from 2022 . No significant  changes noted.     ______________________________________________________________________________  MMode/2D Measurements & Calculations  IVSd: 0.96 cm  LVIDd: 4.9 cm  LVIDs: 4.9 cm  LVPWd: 0.82 cm  FS: 0.65 %  LV mass(C)d: 149.5 grams  LV mass(C)dI: 76.6 grams/m2     RWT: 0.33     Doppler Measurements & Calculations  TR max saumya: 257.8 cm/sec  TR max P.6 mmHg     ______________________________________________________________________________  Report approved by: Graciela Torres 12/10/2022 02:28 PM         Cardiac Catheterization    Narrative      Right sided filling pressures are mildly elevated.    Mild elevated pulmonary hypertension.    Left sided filling pressures are normal.    Normal cardiac output level.    Hemodynamic data has been modified in Epic per physician review.      Cardiac Device Check - Inpatient     Value    Date Time Interrogation Session [id number]    Implantable Pulse Generator  Medtronic    Implantable Pulse Generator Model [id number] Visia AF MRI VR    Implantable Pulse Generator Serial Number [id number]    Type Interrogation Session In Clinic    Clinic Name [facility]    Implantable Pulse Generator Type Defibrillator    Implantable Pulse Generator Implant Date 2020    Implantable Lead  Medtronic    Implantable Lead Model 6935M Sprint Quattro Secure S MRI SureScan    Implantable Lead Serial Number [id number]    Implantable Lead Implant  Date 20200316    Implantable Lead Polarity Type Tripolar Lead    Implantable Lead Location Detail 1 UNKNOWN    Implantable Lead Special Function 62cm length    Implantable Lead Location Right Ventricle    Glenn Setting Mode (NBG Code) VVIR    Glenn Setting Lower Rate Limit 60    Glenn Setting Maximum Sensor Rate 115    Glenn Setting Hysterisis Rate DISABLED    Lead Channel Setting Sensing Polarity Bipolar    Lead Channel Setting Sensing Anode Location Right Ventricle    Lead Channel Setting Sensing Anode Terminal Ring    Lead Channel Setting Sensing Cathode Location Right Ventricle    Lead Channel Setting Sensing Cathode Terminal Tip    Lead Channel Setting Sensing Sensitivity 0.3    Lead Channel Setting Pacing Polarity Bipolar    Lead Channel Setting Pacing Anode Location Right Ventricle    Lead Channel Setting Pacing Anode Terminal Ring    Lead Channel Setting Sensing Cathode Location Right Ventricle    Lead Channel Setting Sensing Cathode Terminal Tip    Lead Channel Setting Pacing Pulse Width 0.4    Lead Channel Setting Pacing Amplitude 2    Lead Channel Setting Pacing Capture Mode Adaptive    Zone Setting Type Category VF    Zone Setting Detection Interval 270    Zone Setting Detection Beats Numerator 30    Zone Setting Detection Beats Denominator 40    Zone Setting Type Category VT    Zone Setting Detection Interval Blank    Zone Setting Type Category VT    Zone Setting Detection Interval 460    Zone Setting Type Category VT    Zone Setting Detection Interval 500    Zone Setting Type Category ATRIAL_FIBRILLATION    Zone Setting Type Category AT/AF    Lead Channel Impedance Value 418    Lead Channel Sensing Intrinsic Amplitude 14.0    Lead Channel Pacing Threshold Amplitude 1.0    Lead Channel Pacing Threshold Pulse Width 0.4    Battery Date Time of Measurements 78876150507610    Battery Status Middle of Service    Battery RRT Trigger 2.727    Battery Remaining Longevity 111    Battery Voltage 3.00    Capacitor  Charge Type Reformation    Capacitor Last Charge Date Time 20221021180701    Capacitor Charge Time 3.743    Capacitor Charge Energy 18    Glenn Statistic Date Time Start 50337204912254    Glenn Statistic Date Time End 20221209095127    Glenn Statistic RV Percent Paced 92.43    Atrial Tachy Statistic Date Time Start 39244557120457    Atrial Tachy Statistic Date Time End 20221209095127    Atrial Tachy Statistic AT/AF [place] Percent 0    Therapy Statistic Recent Shocks Delivered 0    Therapy Statistic Recent Shocks Aborted 0    Therapy Statistic Recent ATP Delivered 0    Therapy Statistic Recent Date Time Start 17882146456717    Therapy Statistic Recent Date Time End 20221209095127    Therapy Statistic Total Shocks Delivered 0    Therapy Statistic Total Shocks Aborted 0    Therapy Statistic Total ATP Delivered 0    Therapy Statistic Total  Date Time Start 43874207348169    Therapy Statistic Total  Date Time End 20221209095127    Episode Statistic Recent Count 0    Episode Statistic Type Category AT/AF    Episode Statistic Recent Count 0    Episode Statistic Type Category SVT    Episode Statistic Recent Count 0    Episode Statistic Type Category VT    Episode Statistic Recent Count 0    Episode Statistic Type Category VF    Episode Statistic Recent Count 0    Episode Statistic Type Category VT    Episode Statistic Recent Count 0    Episode Statistic Type Category VT    Episode Statistic Recent Count 0    Episode Statistic Type Category VT    Episode Statistic Recent Date Time Start 89375575930050    Episode Statistic Recent Date Time End 82418325090807    Episode Statistic Recent Date Time Start 06451287151082    Episode Statistic Recent Date Time End 33758239624427    Episode Statistic Recent Date Time Start 13571715733672    Episode Statistic Recent Date Time End 07405853003686    Episode Statistic Recent Date Time Start 51072334406067    Episode Statistic Recent Date Time End 23059986856783    Episode Statistic  Recent Date Time Start 70495546834248    Episode Statistic Recent Date Time End 77497320468833    Episode Statistic Recent Date Time Start 84376566848995    Episode Statistic Recent Date Time End 73331978386886    Episode Statistic Recent Date Time Start 86362538437325    Episode Statistic Recent Date Time End 56790857634584    Episode Statistic Total Count 233    Episode Statistic Type Category AT/AF    Episode Statistic Total Count 0    Episode Statistic Type Category SVT    Episode Statistic Total Count 0    Episode Statistic Type Category VT    Episode Statistic Total Count 0    Episode Statistic Type Category VF    Episode Statistic Total Count 0    Episode Statistic Type Category VT    Episode Statistic Total Count 0    Episode Statistic Type Category VT    Episode Statistic Total Count 2    Episode Statistic Type Category VT    Episode Statistic Total Date Time Start 49268927774876    Episode Statistic Total Date Time End 20221209095127    Episode Statistic Total Date Time Start 82543467551285    Episode Statistic Total Date Time End 74666544374235    Episode Statistic Total Date Time Start 12251947590638    Episode Statistic Total Date Time End 09349106073107    Episode Statistic Total Date Time Start 08380198344045    Episode Statistic Total Date Time End 75920269770494    Episode Statistic Total Date Time Start 49552531242018    Episode Statistic Total Date Time End 16300101404875    Episode Statistic Total Date Time Start 10637758857803    Episode Statistic Total Date Time End 20221209095127    Episode Statistic Total Date Time Start 20200316145425    Episode Statistic Total Date Time End 20221209095127    Narrative    Patient seen on [address] [place] [address] for evaluation and iterative programming of their   ICD per MD orders.     Device: Medtronic [id number] Visia AF MRI VR  Normal device function   Mode: VVIR  bpm  : 92.4%  Intrinsic rhythm: VS @ 40-50s bpm  Thoracic Impedance: Near reference line.   Short V-V  intervals: 0  Lead Trends Appear Stable.   Estimated battery longevity to RRT = 9.2 years. Battery voltage = 3.0 V.   Anticoagulant: warfarin  Setting Changes: None   Plan: Send a remote in 1 month, and RTC in March 2023 as scheduled.   RAMON Garcia RN    Single lead ICD    I have reviewed and interpreted the device interrogation, settings,   programming and nurse's summary. The device is functioning within normal   device parameters. I agree with the current findings, assessment and plan.     *Note: Due to a large number of results and/or encounters for the requested time period, some results have not been displayed. A complete set of results can be found in Results Review.     CONSULTATIONS:   1. CVTS    HOSPITAL COURSE:   Recurrent driveline infections MRSA/MSSA, 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   Eliseo has a history of recurrent MSSA infections. He has been on IV cefazolin 10/21-11/18 due to increased driveline drainage with +MSSA, which improved but did not completely resolve.  He was transitioned to po bactrim 11/19, but then developed increased purulent drainage 1 week later, prompting this admission.  Would cultures from OSH +S. Aureus, would cultures from admission also +S. Aureus. CT abdomen revealed fluid along driveline tract, increased from prior, but no organized abscess. S/p I&D per CVTS on 12/11. Rule wound culture from 12/12 growing MRSA (prior infections tetra-resistant MSSA. ID and CVTS consult appreciated. Micafuncin and zosyn discontinued 12/9, stopped vanco 12/11, Cefazolin 2g IV discontinued 12/17. Daptomycin initiated 12/17. wound vac placed 12/18 per CVTS. NOTE: he does not have insurance coverage for home antibiotics, historically has been able to getinfusions at his local clinic (and ER on the weekends/holidays). He would prefer to do this rather than SNF. Weekly labs (fax to [phone]): CBC w/ diff, CMP, CK. ID follow up in 3-4 weeks (to be seen prior to stopping  IV abx) to discuss PO suppressive regimen.    Acute blood loss anemia secondary to abdominal wound. Ongoing bleeding/wound drainage requiring frequent packing during hospitalization.                 - s/p 1 unit FFP 12/14                - stitch placed 12/15 by CVTS, bleeding slowed                - Hgb 9.2 stable at discharge.                 - resumed warfarin 12/16, aspirin 12/19 - ok per CVTS     Chronic systolic heart failure/HFrEF secondary to NICM s/p HM3 LVAD as DT on 2/2020. RHC 12/12/22: RA 9, mPA 22, PCW 13.  Stage D. NYHA Class II-III.  - Fluid status: euvolemic, Bumex resumed at 6 mg po BID with hydrochlorothiazide bi-weekly at discharge. Entresto held at discharge.   - Afterload reduction: hydralazine 100 mg tid, Imdur 90 mg daily  - BB contraindicated due to bradycardia and RV failure per prior notes  - Aldosterone antagonist deferred  - SGLT2i: Farxiga 5 mg daily   - SCD prophylaxis: ICD  - Antiplatelet:  aspirin 81mg daily resumed  - Anticoagulation: warfarin per pharmacy goal INR 1.7-2.3 (per [facility] notes). INR today 1.32, warfarin held since 12/13, s/p 1 unit FFP 12/14, resumed 12/16, defer bridging given recent bleeding.  - MAPs: 77  - LDH: 364 (12/10)  - Remote monitoring: CardioMEMs to continue at home.      HTN  - continue hydral, imdur, and amlodipine 10mg daily     Hypokalemia, diuretic induced, resolved  - replete K per protocol. K 4.0 at time of discharge.     Hepatic injury, likely cholestatic, R factor 1  Found to have elevated , , Alk Phos 330. Likely predominantly cholestatic given R factor 1. Possibly related to congestion. CT with chloeithiasis without cholecysitits.   - PTA atorvastatin has been held, hold again for rising LFTs. AST-159 and ALT-81 at time of discharge.     CKD III  Baseline Cr 1.6-1.9 GFR low 40s.   - Cr 1.35 day of discharge.      Atrial fibrillation. Slow VT.  [id number] 4. History of afib w/ RVR and aberrancy vs. NATHALIA vs. AT vs. Slow VT.  S/p DCCV on  2/28/22, back into sinus rhythm.  - continue pta amiodarone 200mg daily     CAD  Mild to moderate CAD with severe branch disease per angiogram 11/2019.   - ASA, statin as above  - BB deferred as above  - Statin on hold at discharge. Please reassess as LFT's and Hgb stabilize.      Hypothyroidism.Last TSH 5.16 on 10/18/22, 6.11 with FT4 of 1.19 this admission. On amiodarone as above   - continue levothyroxine 50mcg daily     DM II, controlled.   A1c 6.3 on 9/2/22. Continue dapagliflozin and home regimen as prior to admission.     DISCHARGE MEDICATIONS:  Discharge Medication List as of 12/20/2022 11:44 AM      START taking these medications    Details   DAPTOmycin (CUBICIN) 500 MG injection Inject 10 mLs (500 mg) into the vein every 24 hours for 18 days, Disp-180 mL, R-0, E-Prescribe         CONTINUE these medications which have CHANGED    Details   magnesium oxide (MAG-OX) 400 MG tablet 1 tablet (400 mg) by Oral or Feeding Tube route 2 times daily, Disp-30 tablet, R-1, Historical      warfarin ANTICOAGULANT (COUMADIN) 2 MG tablet Take 2.5 tablets (5 mg) by mouth daily, Historical         CONTINUE these medications which have NOT CHANGED    Details   allopurinol (ZYLOPRIM) 100 MG tablet 2 tablets (200 mg) by Oral or Feeding Tube route daily, Disp-60 tablet, R-1, E-Prescribe      amiodarone (PACERONE) 200 MG tablet TAKE 1 TABLET BY MOUTH ONCE DAILY, Disp-90 tablet, R-3, E-Prescribe      amLODIPine (NORVASC) 5 MG tablet Take 2 tablets (10 mg) by mouth daily, Disp-180 tablet, R-3, E-Prescribe      aspirin (ASA) 81 MG EC tablet Take 1 tablet (81 mg) by mouth daily, Disp-90 tablet, R-3, E-Prescribe      B Complex-C-Folic Acid (RENAL) 1 MG CAPS Take 1 capsule by mouth daily, Disp-30 capsule, R-0, E-PrescribeFurther refills should go to primary care or nephrology      bumetanide (BUMEX) 2 MG tablet Take 3 tablets (6 mg) by mouth 2 times daily, Disp-540 tablet, R-3, E-Prescribe      co-enzyme Q-10 200 MG CAPS 200 mg by Oral  or Feeding Tube route daily, Historical      dapagliflozin (FARXIGA) 5 MG TABS tablet Take 5 mg by mouth daily, 5 mg, Oral, DAILY, Historical      finasteride (PROSCAR) 5 MG tablet Take 1 tablet (5 mg) by mouth daily Helps with urinary retention., Disp-30 tablet, R-0, E-Prescribe      FLUoxetine (PROZAC) 10 MG capsule Take 30 mg by mouth daily, Historical      hydrALAZINE (APRESOLINE) 100 MG tablet Take 1 tablet (100 mg) by mouth 3 times daily, Disp-270 tablet, R-3, E-Prescribe      hydrochlorothiazide (HYDRODIURIL) 25 MG tablet Take 75 mg (3 tabs) every Wednesday & Saturday's, Disp-90 tablet, R-3, E-Prescribe      insulin pen needle (32G X 4 MM) 32G X 4 MM miscellaneous Historical      isosorbide mononitrate (IMDUR) 30 MG 24 hr tablet Take 3 tablets (90 mg) by mouth daily, Disp-270 tablet, R-3, E-Prescribe      levothyroxine (SYNTHROID/LEVOTHROID) 75 MCG tablet Take 1 tablet (75 mcg) by mouth every morning (before breakfast), Disp-30 tablet, R-1, E-Prescribe      omeprazole (PRILOSEC) 20 MG DR capsule Take 20 mg by mouth daily, Historical      potassium chloride ER (KLOR-CON M) 20 MEQ CR tablet Take 80 mEq (4 tabs) in the AM and 80 mEq (4 tabs) in the PM. Take an additional 60 mEq on Wednesdays and Saturdays with HCTZ, Disp-720 tablet, R-3, E-Prescribe      senna-docusate (SENOKOT-S/PERICOLACE) 8.6-50 MG tablet Take 1 tablet by mouth 2 times daily as needed for constipation , Historical      tamsulosin (FLOMAX) 0.4 MG capsule Take 2 capsules (0.8 mg) by mouth daily This med helps with urinary retention, Disp-30 capsule, R-0, Historical      zolpidem (AMBIEN) 5 MG tablet Take 5 mg by mouth nightly as needed for Insomnia, Historical         STOP taking these medications       atorvastatin (LIPITOR) 20 MG tablet Comments:   Reason for Stopping:         cephALEXin (KEFLEX) 500 MG capsule Comments:   Reason for Stopping:         insulin glargine (BASAGLAR KWIKPEN) 100 UNIT/ML pen Comments:   Reason for Stopping:          nitroGLYcerin (NITROSTAT) 0.4 MG sublingual tablet Comments:   Reason for Stopping:         sacubitril-valsartan (ENTRESTO) 24-26 MG per tablet Comments:   Reason for Stopping:         sulfamethoxazole-trimethoprim (BACTRIM) 400-80 MG tablet Comments:   Reason for Stopping:               DISCHARGE DISPOSITION: Eliseo Tanner will discharge to home in stable condition.     DISCHARGE INSTRUCTIONS:  Discharge Procedure Orders   Medication Therapy Management Referral   Referral Priority: Routine Referral Type: Med Therapy Management   Requested Specialty: Pharmacist   Number of Visits Requested: 1     Reason for your hospital stay   Order Comments: You were admitted to the hospital due to an infection in your drive line. Please notify your team if you have fever, chills, abdominal swelling, increased shortness of breath, increased swelling in her feet, and weight gain of 3 lbs overnight and 5 lbs in a week.     Activity   Order Comments: Your activity upon discharge: activity as tolerated     Order Specific Question Answer Comments   Is discharge order? Yes      Wound care and dressings   Order Comments: Instructions to care for your wound at home: LVAD drive line dressing change as prior to admission.     Follow Up ([facility])   Order Comments: Follow up with ID as recommended.   Follow up in Cardiology clinic in 1-2 weeks.   Follow up CMP, CBC, LDH, and INR Thursday 12/22//22.    Appointments on [place] and/or [facility] (with [facility] or [facility] provider or service). Call [phone] if you haven't heard regarding these appointments within 7 days of discharge.     Follow Up and recommended labs and tests   Order Comments: [facility] [place]   Phone: [phone]  Fax: [phone]    For daily IV daptomycin infusions, starting on Wednesday 12/21 at 10am.  Weekly midline dressing changes.     Weekly labs: CBC w/ diff, CMP, CK  Fax results to: [facility] [phone]  INR lab draws  with results to [facility], Fax: [phone], next due on 12/22/22.     Plan for at least 4 week course from date of transition to daptomycin (12/17/22), will need to see ID prior to stopping antibiotics.      ID follow up in 3-4 weeks (to be seen prior to stopping IV abx) to discuss PO suppressive regimen    Wound VAC dressing changes  Frequency: Mondays, Wednesdays and Fridays starting on Friday 12/23. They will coordinate this with your infusion time.   Wound vac pressure setting: -125mmhg     Full Code     Order Specific Question Answer Comments   Code status determined by: Discussion with patient/ legal decision maker      Diet   Order Comments: Follow this diet upon discharge: -2 gram sodium diet     Order Specific Question Answer Comments   Is discharge order? Yes        45 minutes spent in discharge, including >50% in counseling and coordination of care, medication review and plan of care recommended on follow up. Please do not hesitate to contact me should there be questions regarding the hospital course or discharge plan.      JUAN Palacio CNP FNP-C  12/20/2022  [phone]

## 2022-12-20 NOTE — PROGRESS NOTES
Cardiovascular Surgery Progress Note  12/20/2022         Assessment and Plan:     Eliseo Tanner is a 67 year old male with chronic systolic heart failure secondary to NICM (LVEF 15-20% per TTE 9/2022), s/p HM3 LVAD (2/18/2020, DT) c/b recurrent drive-line infections, moderate CAD, ABHINAV (on CPAP), DMII, HTN, CKD-III, and ANA who was admitted for an acute heart failure exacerbation and concerns for recurrent drive-line infection.  S/P I&D of driveline site on 12/11/22 with Dr Mac Jaramillo.      - Wound vac placed yesterday with no issues overnight with bleeding or alarms.   - Home wound vac applied today and new dressing placed.   - Patient set up with daily antibiotic infusion at infusion center. First infusion tomorrow at 10AM. Vac to be changed 3 times weekly as outpatient. Arrangements made by care coordinator.     Discussed with Dr Jaramillo through written and verbal communication.      Betty Aguilar PA-C  Cardiothoracic Surgery  Pager [phone]  December 20, 2022          Interval History:     No overnight events. Home wound vac supplies arrived and in room. Home vac applied today with dressing. Educated patient on use.          Physical Exam:   Blood pressure 102/74, pulse 60, temperature 98.1  F (36.7  C), temperature source Oral, resp. rate 16, weight 79.9 kg (176 lb 1.6 oz), SpO2 98 %.  Vitals:    12/18/22 0425 12/19/22 0405 12/20/22 0413   Weight: 80.7 kg (177 lb 14.4 oz) 80.2 kg (176 lb 12.8 oz) 79.9 kg (176 lb 1.6 oz)      Gen: A&Ox4, NAD  Neuro: no focal deficits   CV: HD stable w VAD  Pulm: normal breathing on RA  Incision: wound vac removed with good pink granulation tissue within wound. No slough or drainage. No bleeding within wound. Wound dimensions 9 cm long by 3 cm wide by 2 cm deep, no tunneling. New vac dressing applied and connected to home vac system.          Data:    Imaging:  reviewed recent imaging, no acute concerns    Labs:  BMP  Recent Labs   Lab 12/20/22  1135  12/20/22  0731 12/20/22  0408 12/19/22  2129 12/19/22  0739 12/19/22  0458 12/18/22  0736 12/18/22  0559 12/17/22  1201 12/17/22  0656   NA  --   --  136  --   --  134*  --  129*  --  136   POTASSIUM  --   --  4.0  --   --  4.1  --  4.2  --  4.4   CHLORIDE  --   --  98  --   --  99  --  94*  --  98   CALOS  --   --  8.5*  --   --  8.4*  --  8.1*  --  8.1*   CO2  --   --  29  --   --  27  --  29  --  30*   BUN  --   --  25.6*  --   --  26.6*  --  28.8*  --  30.1*   CR  --   --  1.35*  --   --  1.35*  --  1.42*  --  1.52*   * 105* 96 151*   < > 96   < > 97   < > 108*    < > = values in this interval not displayed.     CBC  Recent Labs   Lab 12/20/22  0408 12/19/22  0458 12/18/22  1744 12/18/22  0559 12/17/22  1638 12/17/22  0656   WBC 7.5 8.0  --  7.9  --  7.9   RBC 3.16* 3.07*  --  3.12*  --  3.08*   HGB 9.2* 9.3* 9.1* 9.3*   < > 9.4*   HCT 29.8* 29.5*  --  30.5*  --  30.1*   MCV 94 96  --  98  --  98   MCH 29.1 30.3  --  29.8  --  30.5   MCHC 30.9* 31.5  --  30.5*  --  31.2*   RDW 15.5* 15.7*  --  15.9*  --  15.8*    263  --  293  --  286    < > = values in this interval not displayed.

## 2022-12-20 NOTE — PLAN OF CARE
Temp: 98.6  F (37  C) Temp src: Oral BP: 90/81 Pulse: 60   Resp: 20 SpO2: 96 % O2 Device: None (Room air)       D: 67 year old male with chronic systolic heart failure secondary to NICM (LVEF 15-20% per TTE 9/2022), s/p HM3 LVAD (2/18/2020, DT) c/b recurrent drive-line infections, moderate CAD, ABHINAV (on CPAP), DMII, HTN, CKD-III, and ANA who was admitted for an acute heart failure exacerbation and concerns for recurrent drive-line infection.  S/P I&D of driveline site on 12/11/22 with Dr Mac Jaramillo.     I/A: Bon (he/him) is A/Ox4. Tele in place, V paced. VSS on RA. L double lumen midline SL. Wound vac to driveline at -125 pressure w/ minimal output. Up independently in the room.     P: Continue to monitor and follow POC. Plan for discharge to home tomorrow. Notify CVTS with changes.     Marietta Richey, RN

## 2022-12-20 NOTE — PROGRESS NOTES
ANTICOAGULATION  MANAGEMENT: Discharge Review    lEiseo Tanner chart reviewed for anticoagulation continuity of care    Hospital Admission on - for Infection in the Drive-Line.    Discharge disposition: Home    Results:    Recent labs: (last 7 days)     22  0651 12/15/22  0437 22  0536 22  0656 22  0559 22  0458 22  0408   INR 1.86* 1.65* 1.33* 1.21* 1.22* 1.22* 1.32*     Anticoagulation inpatient management:     see calendar     Anticoagulation discharge instructions:     Warfarin dosinmg daily    Bridging: No   INR goal change: No      Medication changes affecting anticoagulation: No    Additional factors affecting anticoagulation: Yes: chronic drive-line infection    Daptomycin 500mg via IV Q 24 Hours for 18 days     PLAN     No adjustment to anticoagulation plan needed    Patient not contacted    Anticoagulation Calendar updated    Luiza Perkins RN

## 2022-12-20 NOTE — PLAN OF CARE
Dx: Driveline infection S/p abdominal wound I&D 12/11    Neuro: A&O x4  Cardiac:  O/PVC. Afebrile. VSS. PI down to 1.8 when asleep. No alarm this shift.  Respiratory: On RA when awake otherwise wears CPAP when sleeping. LS clear.  GI/: BM+. Voiding adequately.  Diet: Reg.   Skin: Wound vac to be changed by CVTS prior to discharge  Pain: denied  LDAs: L DL PICC  Electrolytes: Mag to be replaced this AM  Mobility: IND    Plan: Stable for discharge to home with Wound Vac and IV infusion for Daptomycin.     Goal Outcome Evaluation:      Plan of Care Reviewed With: patient    Overall Patient Progress: improvingOverall Patient Progress: improving    Outcome Evaluation: Pt verbalized readiness for discharge to home. Tolerating activity in room independently.

## 2022-12-20 NOTE — PLAN OF CARE
DISCHARGE                         12/20/2022 12:28 PM  ----------------------------------------------------------------------------  Discharged to: Home  Via: Automobile  Accompanied by: Family  Discharge Instructions: diet, activity, medications, follow up appointments, when to call the MD, aftercare instructions, and what to watchout for (i.e. s/s of infection, increasing SOB, palpitations, chest pain,)  Prescriptions: To be filled by       pharmacy per pt's request; medication list reviewed & sent with pt  Follow Up Appointments: arranged; information given  Belongings: All sent with pt  IV: PICC in place for IV Daptomycin home infusion  Telemetry: off  Pt exhibits understanding of above discharge instructions; all questions answered.    Discharge Paperwork: Signed, copied, and sent home with patient.

## 2022-12-20 NOTE — PROGRESS NOTES
[facility], [facility]  Parenteral ANtibiotic Review at Departure from Acute Care [facility]     Antimicrobial Stewardship Program - A joint venture between [facility] and  Physicians to optimize antibiotic management.  NOT a formal consult - Restricted Antimicrobial Review     Patient: Eliseo Tanner  MRN: [id number]  Allergies: Heparin, Oxycodone, and Chlorhexidine    Brief Summary: Bon Tanner is a 67 year old male with history of NICM s/p HM3 LVAD and ICD placement (2/18/2020) c/b chronic MSSA driveline infection and subsequent MSSA bacteremia (11/2020), DM, CKD who was admitted on 12/8/2022 with increased driveline drainage and heart failure exacerbation. He was previously on suppressive antibiotic regimens for chronic MSSA driveline infection with cephalexin and cefadroxil with breakthrough drainage despite in vitro susceptibilities. He is also s/p cefazolin x4 weeks (10/21-11/18) for worsening of chronic MSSA driveline infection. Drainage improved during this time, but did not fully resolve. He was transitioned to Bactrim on 11/19 for suppression but had increased purulent drainage. On admission, 12/8 CT chest/abdomen/pelvis showed LVAD driveline with surrounding fluid collection increased when compared to prior. Patient underwent I&D on 12/11 with intraoperative drainage cultures positive with MSSA. However, the [facility] ID team was contacted on 12/17 with OSH cultures growing MRSA. He was transitioned from cefazolin to daptomycin with plans in place to complete at least 4 week course, definitive end date will be determined at ID clinic follow up.    Antimicrobial Dose/Route/Frequency Duration/Indication Start Date End Date   Daptomycin 500 mg (~6 mg/kg ActBW)/IV/every 24 hours At least 4 weeks/ (MSSA/MRSA driveline infection) 12/17/2022 Definitive end date will be determined by ID provider      Laboratory Tests: CBC with Diff, CMP, CK Level   Lab  Monitoring Frequency: 1x week   Therapeutic Drug Monitoring:  none   Therapeutic Drug Monitoring Frequency:  none   Miscellaneous Drug Monitoring:  none     Line Type: Midline    Reassess Line/Pull Line Date: Midline may be pulled at the completion of IV antibiotic therapy     First Dose Received in Controlled Setting: Yes    Designated Provider: Dr. Negar Bhatti will follow labs at discharge until ID follow-up    Follow-up: Patient does  have outpatient ID follow-up. Appointment date/time: 3/28/2023 @ 2:30 PM with Dr. Bhatti.    Recommendations/Additional Information:   1. Please fax laboratory results to [facility] ([phone]), attn: Nilsa Toledo   2. Will need outpatient ID follow-up sooner (before 1/13/2023) than current scheduled appointment to determine oral suppressive therapy after completion of IV daptomycin     Reshma Lucero, PharmD, [facility]  Pager: [phone]

## 2022-12-20 NOTE — PROGRESS NOTES
Care Management Discharge Note    Discharge Date: 12/20/2022     Discharge Disposition: Home    Anticipated Discharge Services: Outpatient infusion and wound vac dressing changes  Anticipated Discharge DME: Home wound vac      Discharge Transportation: family or friend will provide    Education Provided on the Discharge Plan: Yes    Persons Notified of Discharge Plans: Pt, RN, NP  Patient/Family in Agreement with the Plan: Yes    Additional Information:  Per NP, pt medically ready for discharge home today. Confirmed home wound vac delivered to pt's room, pt signed proof of delivery form and this writer faxed. Wound vac dressing changed this morning by CVTS, plan for first outpt  Reviewed plan to start outpatient infusion tomorrow, pt will receive his dose of IV dapto here today prior to discharge. Pt states his wife is on her way down and will be here later this morning to pick him up.     Will fax discharge orders and summary to [facility] once completed.      [facility] [place]   Phone: [phone]   Fax: [phone]   For daily IV daptomycin infusions, starting on Wednesday 12/21 at 10am.   Wound VAC dressing changes   Frequency: Mondays, Wednesdays and Fridays starting on Friday 12/23.    CC will continue to monitor patient's medical condition and progress towards discharge.  Carmita Farnsworth RN BSN  6C Unit Care Coordinator  Phone number: [phone]  Pager: [phone]

## 2022-12-21 NOTE — PROGRESS NOTES
Addendum 12/21/22: [facility] F[facility] # [phone]      PACO MARQUEZ RN-BC, [facility]  Anticoagulation Clinic  [phone]

## 2022-12-21 NOTE — PROGRESS NOTES
"Clinic Care Coordination Contact  [facility]: Post-Discharge Note  SITUATION                                                      Admission:    Admission Date: 12/08/22   Reason for Admission: Hypervolemia, unspecified hypervolemia type  Discharge:   Discharge Date: 12/20/22  Discharge Diagnosis: Hypervolemia, unspecified hypervolemia type    BACKGROUND                                                      Per hospital discharge summary and inpatient provider notes:  Eliseo Tanner is a 67-year-old man with systolic heart failure 2/2 NICM s/p destination HM-III LVAD (2/18/2020) c/b recurrent drive-line infections, moderate CAD, ABHINAV on CPAP, T2DM, HTN, CKD-III, ANA, who is admitted for heart failure exacerbation and recurrent drive-line infection.     Patient is admitted from clinic, where he had been seen for a pre-planned LVAD management follow-up visit.  Purulent drainage was noted at the driveline site and patient was advised to present to the [facility] ED.     Patient was admitted in September 2022 for heart failure exacerbation and was diuresed to a new dry weight of 178 pounds from 218. He had been well since discharge, though he has noted an increase in his weight since Thanksgiving. He also reports increased abdominal girth and lower extremity edema. Otherwise, patient does not believe he has been worse.  He denies chest pain, shortness of breath, palpitations, and LVAD alarms.     In the ED, K 3.9, Cr 1.78, , , Alk Phos 330. Blood and driveline Cx were sent, and patient was admitted to Cards 2.       ASSESSMENT           Discharge Assessment  How are you doing now that you are home?: \" Doing Great\"  How are your symptoms? (Red Flag symptoms escalate to triage hotline per guidelines): Improved  Do you feel your condition is stable enough to be safe at home until your provider visit?: Yes  Does the patient have their discharge instructions? : Yes  Does the patient have questions regarding their " discharge instructions? : No  Were you started on any new medications or were there changes to any of your previous medications? : Yes  Does the patient have all of their medications?: Yes  Do you have questions regarding any of your medications? : No  Do you have all of your needed medical supplies or equipment (DME)?  (i.e. oxygen tank, CPAP, cane, etc.): Yes  Discharge follow-up appointment scheduled within 14 calendar days? : No  Discharge Follow Up Appointment Date: 01/18/23  Discharge Follow Up Appointment Scheduled with?: Specialty Care Provider  Is patient agreeable to assistance with scheduling? : No                  PLAN                                                      Outpatient Plan:    Instructions to care for your wound at home: LVAD drive line dressing change as prior to admission.          Follow Up ([facility])   Order Comments: Follow up with ID as recommended.   Follow up in Cardiology clinic in 1-2 weeks.   Follow up CMP, CBC, LDH, and INR Thursday 12/22//22.     Appointments on [place] and/or [facility] (with [facility] or [facility] provider or service). Call [phone] if you haven't heard regarding these appointments within 7 days of discharge.          Follow Up and recommended labs and tests   Order Comments: [facility] [place]   Phone: [phone]  Fax: [phone]     For daily IV daptomycin infusions, starting on Wednesday 12/21 at 10am.  Weekly midline dressing changes.      Weekly labs: CBC w/ diff, CMP, CK  Fax results to: [facility] Infectious Disease [phone]  INR lab draws with results to [facility], Fax: [phone], next due on 12/22/22.      Plan for at least 4 week course from date of transition to daptomycin (12/17/22), will need to see ID prior to stopping antibiotics.        ID follow up in 3-4 weeks (to be seen prior to stopping IV abx) to discuss PO suppressive regimen     Wound VAC dressing changes  Frequency: Mondays,  Wednesdays and Fridays starting on Friday 12/23. They will coordinate this with your infusion time.   Wound vac pressure setting: -125mmhg          Future Appointments   Date Time Provider Department Center   12/26/2022  1:00 PM Zack Cartagena RPH [facility]   1/18/2023 12:00 AM  ICD REMOTE UCCVSV [facility]   3/23/2023  7:30 AM  LAB UCLABR [facility]   3/23/2023  8:00 AM  CV DEVICE 1 UCCVCV [facility]   3/23/2023  8:30 AM Mervat Decker PA-C [facility]   3/23/2023 11:00 AM Hitesh Cartwright DPM [facility]   3/28/2023  2:30 PM Negar Bhatti DO [facility]         For any urgent concerns, please contact our 24 hour nurse triage line: [phone])         Yessenia Bills MA

## 2022-12-23 NOTE — PROGRESS NOTES
Called patient/caregiver to check in 2 days post discharge. Pt reports VAD parameters and weights stable. Reviewed medications and answered any questions.  Patient is able to move around the house and care for himself independently.     Follow up plan still needing to be arranged with CVTS, ID and Cardiology.  Labs expected for yesterday. Pt unable to get them due to weather.  Lab not open again until Tuesday, Dec 27th.    Encouraged pt to page VAD Coordinator with any issues or questions. Pt verbalizes understanding.

## 2022-12-23 NOTE — PROGRESS NOTES
[facility] - CardioMEMS Reading Review    December 23, 2022, 1000   CardioMems period: 12/19/22 - 1/17/2023      CardioMEMS reviewed and routed to patient's provider, Laine BARRY NP.     Current diuretic: Bumex 6 mg BID.  Hydrochlorothiazide 75 mg every Wednesday and Saturday      Current potassium chloride dose:  Potassium 80mEq BID w/ an extra 60 mEq Potassium on Wednesdays & Saturdays    Symptoms: None  Weights: Today, going back: 177.6, 177.6, 176    Changes to plan of care today: None    Current Threshold parameters: 21 - 24    Today's Waveform:       Readings:         RHC Date Wedge Pressure MEMS PAD during cath  (average of the 3 values)     Sept 20, 2022 w/MEMS implant     15 mmHg   16 mmHg

## 2022-12-23 NOTE — PROGRESS NOTES
[facility] CORE Clinic - CardioMEMS Reading Review    October 20, 2022   Cardiomems period: 11/19 - 12/18    CardioMEMS reviewed and routed to patient's provider, Laine BARRY NP.     Current diuretic dose: Bumex 5 mg BID.  Hydrochlorothiazide 75 mg every Wednesday and Saturday   Current potassium chloride dose:  Potassium 80mEq BID w/ an extra 60 mEq Potassium on Wednesdays & Saturdays     Recent plan of care changes: Bumex increased to 6mg BID with 80 mEq Potassium BID.    Current Threshold parameters: 21-24    Today's Waveform:   Pt currently inpatient for driveline infection and did not complete Mems today.    Reading Ranges:     PAS: 51 - 58  PAD: 21 - 29  PA Mean: 34 - 43  Heart Rate: 66 - 80    RHC Date Wedge Pressure MEMS PAD during cath  (average of the 3 values)     Sept 20, 2022 w/MEMS implant     15 mmHg   16 mmHg

## 2022-12-24 NOTE — PROGRESS NOTES
Medication Therapy Management (MTM) Encounter    ASSESSMENT:                            Medication Adherence/Access: No issues identified        Heart Failure with reduced EF  - Improved.    CKD  - Stable.   - estimated baseline creatinine: 1.35 and eGFR: 57.    Estimated baseline weight is 180 and current weight is Worsened 185 lbs. .  Patient is on an appropriate dose of diuretic. Including sglt-2 drug Farxiga.   Patient is not a candidate for aldosterone antagonist (NYHA Class II-IV HF with eGFR > 30 and K+ < 5.0).  Patient is not a candidate for hydralazine/isosorbide (African American with Stage C and NYHA Class III-IV HF already receiving optimal ACEI/ARB and BB therapy OR non- with AHA Stage C HF intolerant to ACEI/ARB).   Patient is appropriately avoiding NSAID's, diltiazem/verapamil, and actos.  Patient does not need referral to nephrology for eGFR< 30.  Patient would benefit from monitoring daily weight  with his Cardio mems device --f/up closely with  Cards team to determine proper diuretic doses.     Hyperlipidemia: Stable. LDL at goal.   Patient is not on any  intensity statin(per hospital discharge) which is indicated based on 2019 ACC/AHA guidelines for lipid management.          Hypothyroidism: Patient would benefit from lab recheck 2023. Last TSH is above normal range . However, last T4 was 1.19 --wnl.      BELA:  Last hgb too low --stay on daily iron --rechekc lab 12-27-22 --adjust daily iron dose as needed.    Afib/Hypertension/CAD: needs INR recheck 12-27-22 -- coumadin clinic can adjust his warfarin dose.         PLAN:                              1. Eliseo--It was a pleasure to speak to you today and I must reiterate how impressed I am with how well you know your medications and lab results  etc.     Please follow the expert advice of your cardiology team with respect to your recent weight gain -they should have all your data via your Cardio-mems heart failure system .    Have  f/up labs are you stated tomorrow and then go from there, finish all 18 days of your IV-Daptomycin to completely eradicate your infection.    Feel free to message with with any medication questions if the need arises.     Enjoy the new year !      Follow-up: Cardiology team as directed.       SUBJECTIVE/OBJECTIVE:                          Eliseo Tanner is a 67 year old male called for a transitions of care visit. He was discharged from [facility]  on 12-20-22 after 12 days  for ADMIT DIAGNOSES: Hypervolemia:  1. 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 S/p abdominal wound I  & D 12/11/22  3. Acute blood loss anemia secondary to abdominal wound     DISCHARGE DIAGNOSES:   1. Chronic SCHF secondary to NICM s/p HM III LVAD  2. HTN  3. CKD Stage III  4. Afib  5. Slow VT  6. CAD  7. Insomnia  8. GERD  9. BPH  10. Gout  11. IgG Kappa monoclonal gammopathy of undetermined significance          Reason for visit:   hospital discharge med review ; feels good. No swelling , no sob, not bloated.     Allergies/ADRs: Reviewed in chart  Past Medical History: Reviewed in chart and Per patient not a heart transplant candidate.   Tobacco: He reports that he quit smoking about 28 years ago. His smoking use included cigarettes. He has never used smokeless tobacco.  Alcohol: not currently using  Caffeine: 1-2 cups coffee in am or tea,  Personal Healthcare Goals: live as long as he can with LVAD --enjoy life to the fullest.   Activity: very active.    Medication Adherence/Access: no issues reported    Post Hospital Changes:    Started:  Daptomycin injection 500mg. Daily x 18 days . He goes into hospital daily to have this IV administered.     Changed: Magnesium oxide 400mg. -2 x day .  Warfarin 2.5 tablets =5mg. Daily.  INR recheck schedule 12-27-22.       INR   Date Value Ref Range Status   12/20/2022 1.32 (H) 0.85 - 1.15 Final   07/09/2021 3.1 (A) 0.90 - 1.10 Final     Comment:     outside lab      INR HOME MONITORING   Date Value Ref Range Status   11/23/2022 1.7 (L) 1.800 - 2.300 Final     INR (External)   Date Value Ref Range Status   12/01/2022 1.8 (A) 0.9 - 1.1 Final      Stopped:     STOP taking these medications         atorvastatin (LIPITOR) 20 MG tablet Comments:   Reason for Stopping:            cephALEXin (KEFLEX) 500 MG capsule Comments:   Reason for Stopping:            insulin glargine (BASAGLAR KWIKPEN) 100 UNIT/ML pen Comments:   Reason for Stopping:            nitroGLYcerin (NITROSTAT) 0.4 MG sublingual tablet Comments:   Reason for Stopping:            sacubitril-valsartan (ENTRESTO) 24-26 MG per tablet Comments:   Reason for Stopping:            sulfamethoxazole-trimethoprim (BACTRIM) 400-80 MG tablet Comments:   Reason for Stopping:                  CHF with CKD:  Patient was recently hospitalized for heart failure with reduced ejection fraction (HFrEF) and also has co-existing kidney disease.  ACE/ARB Status:    Is patient taking an ACE-I or ARB?  No.  Was patient on aan ACE/ARB at time of admission:   No.  Is there a history of adverse effect or contraindication to ACE/ARB?   Yes -  has LVAD in place as EF 10-15%. 12-8-22.   Beta Blocker Status:    Is patient taking a beta blocker?  No.  Fluid/Diuretic Status:   Is patient taking a loop diuretic?  Yes - current therapy includes:  Bumetanide 6mg. .  Discharge dose:  6mg. twice daily .  Current Dose:  6mg. twice daily (Also takes hydrochlorothiazide 75mgs. On wednesdays and saturdays ).  Also takes corresponding potassium chloride 40 meqs 2 x day and extra 60meqs on wednesdays and saturdays.   Also Takes Farxiga 5mg./day as well.   Admission weight: 190lbs .  Discharge weight:  177lbs. (he states dry optimum weight for him is 180lbs.)  Is patient performing home weights?  Yes.  Is weight checked at consistent time of day?    Yes  Most recent home weight:  185 lbs on date:   12-26-22 but he states gainign about 2lbs./day since discharge  on 12-20-22.     Has cardiomems implant in chest --send vitals to cards team daily --reg smith is his RN-care coordinator. He did send mychart to them as well to let them know he is gaining weight -about 2 lbs daily since discharge.     Remote pulmonary artery pressure monitors, like your  CardioMEMS HF System, will allow you to track and analyze the pressure in your pulmonary artery from the comfort of your own home.  Changes in pulmonary pressure is an early sign of heart failure progression    Any current symptoms of fluid overload?  No.  Any current symptoms of dehydration?  No.  Is patient following a low-sodium diet?  2400mgs./day   Aldosterone Antagonist Status:  Does patient have AHA Stage C or NYHA Class II-IV HF, maximized on ACEI/ARB and BB with CrCL or eGFR > 30 and K+ < 5?  No.  An MRB is not indicated  NSAID Avoidance: Is patient currently taking any NSAIDs?  No  Is patient currently taking any other medications that should be avoided in heart failure?  No.    Wt Readings from Last 5 Encounters:   12/20/22 176 lb 1.6 oz (79.9 kg)   10/18/22 196 lb 3.4 oz (89 kg)   10/18/22 196 lb 1.6 oz (89 kg)   09/20/22 191 lb 4.8 oz (86.8 kg)   09/20/22 180 lb (81.6 kg)     No results found for: MICROL  No results found for: MICROALBUMIN     Last Ejection Fraction:  10-15% - date 12-8-2022  Current ankle edema?  0-1.  BP Readings from Last 3 Encounters:   12/20/22 102/74   10/18/22 (!) 70/50   10/18/22 (!) 80/0           Hyperlipidemia: Current therapy includes no current medications-STATIN DRUG STOPPED IN HOSPITAL.  Patient reports no significant myalgias or other side effects.  The ASCVD Risk score (Brenda DK, et al., 2019) failed to calculate for the following reasons:    The valid total cholesterol range is 130 to 320 mg/dL  Recent Labs   Lab Test 09/03/22  0630 02/08/20  0408   CHOL 103 118   HDL 50 71   LDL 42 35   TRIG 53 57       Hypothyroidism: Patient is taking levothyroxine 75 mcg daily. Patient is  having the following symptoms: none. (was told no levo-t dosage change now --will recheck in 2023 ).   TSH   Date Value Ref Range Status   12/08/2022 6.11 (H) 0.30 - 4.20 uIU/mL Final   01/05/2021 5.21 (H) 0.40 - 4.00 mU/L Final        BELA:  Takes feso4 325mg tab daily. Iron lab recheck tomorrow 12-27-22.   Hemoglobin   Date Value Ref Range Status   12/20/2022 9.2 (L) 13.3 - 17.7 g/dL Final   03/19/2021 10.1 (A) 13.3 - 17.7 g/dL Final   ]    Afib/Hypertension/CAD: Patient is currently taking warfarin 5mg./day for anticoagulation. Patient reports no current concerns of bruising or bleeding. Patient does not have a history of GI bleed.   Patient is also taking Amiodarone 200mg./day . Patient reports no current medication side effects.   Patient does not self-monitor blood pressure.        -------------------------------------------------------------------------------------  Post Discharge Medication Reconciliation Status: discharge medications reconciled, continue medications without change.    I spent 50 minutes with this patient today. No changes to medications needed pcp- Jay Steele MD.   . A copy of the visit note was provided to the patient's provider(s).    A summary of these recommendations was sent via [employer].    Zack Cartagena Rph.  Medication Therapy Management Provider  [phone]      Telemedicine Visit Details  Type of service:  Telephone visit  Start Time: 1:00 PM  End Time: 1:50 PM  Originating Location (pt. Location): Home      Distant Location (provider location):  On-site  Provider has received verbal consent for a visit from the patient? Yes     Medication Therapy Recommendations  No medication therapy recommendations to display

## 2022-12-26 NOTE — PATIENT INSTRUCTIONS
"Recommendations from today's MTM visit:                                                    MTM (medication therapy management) is a service provided by a clinical pharmacist designed to help you get the most of out of your medicines.   Today we reviewed what your medicines are for, how to know if they are working, that your medicines are safe and how to make your medicine regimen as easy as possible.      1. Eliseo--It was a pleasure to speak to you today and I must reiterate how impressed I am with how well you know your medications and lab results  etc.     Please follow the expert advice of your cardiology team with respect to your recent weight gain -they should have all your data via your Cardio-mems heart failure system .    Have f/up labs are you stated tomorrow and then go from there, finish all 18 days of your IV-Daptomycin to completely eradicate your infection.    Feel free to message with with any medication questions if the need arises.     Enjoy the new year !      Follow-up: Cardiology team as directed.     It was great speaking with you today.  I value your experience and would be very thankful for your time in providing feedback in our clinic survey. In the next few days, you may receive an email or text message from [employer] with a link to a survey related to your  clinical pharmacist.\"     To schedule another MTM appointment, please call the clinic directly or you may call the MTM scheduling line at [phone] or toll-free at [phone].     My Clinical Pharmacist's contact information:                                                      Please feel free to contact me with any questions or concerns you have.      Zack Cartagena Rph.  Medication Therapy Management Provider  [phone]    "

## 2022-12-26 NOTE — LETTER
December 26, 2022  Eliseo SAINZ Ciro  [address] [place] [address]    Dear Alex Tanner, [facility]     Thank you for talking with me on Dec 26, 2022 about your health and medications. As a follow-up to our conversation, I have included two documents:      1. Your Recommended To-Do List has steps you should take to get the best results from your medications.  2. Your Medication List will help you keep track of your medications and how to take them.    If you want to talk about these documents, please call Zack Cartagena RPH at phone: [phone], Monday-Friday 8-4:30pm.    I look forward to working with you and your doctors to make sure your medications work well for you.    Sincerely,  Zack Cartagena RPH  [facility] Pharmacist, [facility]

## 2022-12-26 NOTE — LETTER
_  Medication List        Prepared on: Dec 26, 2022     Bring your Medication List when you go to the doctor, hospital, or   emergency room. And, share it with your family or caregivers.     Note any changes to how you take your medications.  Cross out medications when you no longer use them.    Medication How I take it Why I use it Prescriber   allopurinol (ZYLOPRIM) 100 MG tablet 2 tablets (200 mg) by Oral or Feeding Tube route daily LVAD (left ventricular assist device) present (H) Mono Gambino PA-C   amiodarone (PACERONE) 200 MG tablet TAKE 1 TABLET BY MOUTH ONCE DAILY LVAD (left ventricular assist device) present (H) Mervat Decker PA-C   amLODIPine (NORVASC) 5 MG tablet Take 2 tablets (10 mg) by mouth daily Chronic systolic congestive heart failure (H) Mervat Decker PA-C   aspirin (ASA) 81 MG EC tablet Take 1 tablet (81 mg) by mouth daily LVAD (left ventricular assist device) present (H) Nader Cisneros MD   B Complex-C-Folic Acid (RENAL) 1 MG CAPS Take 1 capsule by mouth daily Chronic systolic congestive heart failure (H) Nader Cisneros MD   bumetanide (BUMEX) 2 MG tablet Take 3 tablets (6 mg) by mouth 2 times daily Nonischemic Cardiomyopathy (H) JUAN Palacio CNP   co-enzyme Q-10 200 MG CAPS 200 mg by Oral or Feeding Tube route daily Supplement  Mono Gambino PA-C   dapagliflozin (FARXIGA) 5 MG TABS tablet Take 5 mg by mouth daily Blood sugar/Heart failure Entered By History Unknown   DAPTOmycin (CUBICIN) 500 MG injection Inject 10 mLs (500 mg) into the vein every 24 hours for 18 days Bacteremia JUAN Anderson CNP   ferrous sulfate (FEROSUL) 325 (65 Fe) MG tablet Take 325 mg by mouth daily (with breakfast) anemia Patient Reported   finasteride (PROSCAR) 5 MG tablet Take 1 tablet (5 mg) by mouth daily Helps with urinary retention. Voiding difficulty Jess Meraz MD   FLUoxetine (PROZAC) 10 MG capsule Take 30 mg by mouth daily mood Entered By History Unknown   hydrALAZINE  (APRESOLINE) 100 MG tablet Take 1 tablet (100 mg) by mouth 3 times daily Chronic systolic congestive heart failure (H) Nader Cisneros MD   hydrochlorothiazide (HYDRODIURIL) 25 MG tablet Take 75 mg (3 tabs) every Wednesday & Saturday's Chronic systolic congestive heart failure (H) JUAN Palacio CNP      Patient Reported   isosorbide mononitrate (IMDUR) 30 MG 24 hr tablet Take 3 tablets (90 mg) by mouth daily Chronic systolic congestive heart failure (H) Nader Cisneros MD   levothyroxine (SYNTHROID/LEVOTHROID) 75 MCG tablet Take 1 tablet (75 mcg) by mouth every morning (before breakfast) Hypothyroidism, unspecified type Jessica Dutton MD   magnesium oxide (MAG-OX) 400 MG tablet 1 tablet (400 mg) by Oral or Feeding Tube route 2 times daily LVAD (left ventricular assist device) present (H) JUAN Palacio CNP   omeprazole (PRILOSEC) 20 MG DR capsule Take 20 mg by mouth daily  Entered By History Unknown   potassium chloride ER (KLOR-CON M) 20 MEQ CR tablet Take 80 mEq (4 tabs) in the AM and 80 mEq (4 tabs) in the PM. Take an additional 60 mEq on Wednesdays and Saturdays with HCTZ Chronic systolic congestive heart failure (H) JUAN Palacio CNP   senna-docusate (SENOKOT-S/PERICOLACE) 8.6-50 MG tablet Take 1 tablet by mouth 2 times daily as needed for constipation  constipation Entered By History Unknown   tamsulosin (FLOMAX) 0.4 MG capsule Take 2 capsules (0.8 mg) by mouth daily This med helps with urinary retention Voiding difficulty Nader Cisneros MD   warfarin ANTICOAGULANT (COUMADIN) 2 MG tablet Take 2.5 tablets (5 mg) by mouth daily A-fib JUAN Palacio CNP   zolpidem (AMBIEN) 5 MG tablet Take 5 mg by mouth nightly as needed for Insomnia insomnia Patient Reported         Add new medications, over-the-counter drugs, herbals, vitamins, or  minerals in the blank rows below.    Medication How I take it Why I use it Prescriber                          Allergies:      heparin; oxycodone;  chlorhexidine        Side effects I have had:               Other Information:              My notes and questions:

## 2022-12-26 NOTE — LETTER
"Recommended To-Do List      Prepared on: Dec 26, 2022       You can get the best results from your medications by completing the items on this \"To-Do List.\"      Bring your To-Do List when you go to your doctor. And, share it with your family or caregivers.    My To-Do List:  What we talked about: What I should do:       Continue all medications as is , see what cardiology team says about your post hospital weight gain .                 "

## 2022-12-26 NOTE — Clinical Note
Dr. Steele , [facility]-- maddy--I spoke with Eliseo today post Transitions of care MTm visit from recent hospitalization --I was quite impressed for as many meds + lvad --he know whats he takes and is doing quite well --he did report to me that he has cardio mems device that will send data to [facility] but wanted to mention he feels he is gaining about 2 lbs weight /day since discharge despite careful watch of daily sodium intake and no missed diuretic doses. He will have f/up labs tomorrow , please contact him if you need to adjust his diuretic meds?  Thank you for your great care of this delightful patient.  Zack Cartagena [facility]. Medication Therapy Management Provider [phone]

## 2022-12-27 NOTE — PROGRESS NOTES
"ANTICOAGULATION MANAGEMENT     Eliseo Tanner 67 year old male is on warfarin with supratherapeutic INR result. (Goal INR 1.7-2.3)    Recent labs: (last 7 days)     12/27/22  0000   INR 2.5*       ASSESSMENT       Source(s): Patient/Caregiver Call       Warfarin doses taken: Warfarin taken as instructed and Patient reports he was not discharged from the hospital with Warfarin 2mg tablets as discharge instructions has said and patient was cutting his 4mg tablet in half and \"taking\" a little off to try to make 5mg doses.     Diet: No new diet changes identified    New illness, injury, or hospitalization: No    Medication/supplement changes: Currently getting Daptomycin infusions weekly for chronic drive-line infections     Signs or symptoms of bleeding or clotting: No    Previous INR: Subtherapeutic    Additional findings: Changed maintenance dose to 4mg daily since patient has this on hand        PLAN     Recommended plan for ongoing change(s) affecting INR     Dosing Instructions: decrease your warfarin dose (25.7% change) with next INR in 1 week       Summary  As of 12/27/2022    Full warfarin instructions:  2 mg every Tue; 4 mg all other days   Next INR check:  1/2/2023             Telephone call with Eliseo who verbalizes understanding and agrees to plan and who agrees to plan and repeated back plan correctly    Patient using outside facility for labs    Education provided:     None required    Plan made per [facility] anticoagulation protocol and per LVAD protocol    Luiza Perkins RN  Anticoagulation Clinic  12/27/2022    _______________________________________________________________________     Anticoagulation Episode Summary     Current INR goal:  1.7-2.3   TTR:  39.5 % (11.2 mo)   Target end date:  Indefinite   Send INR reminders to:  ANTICOAG LVAD    Indications    LVAD (left ventricular assist device) present (H) [Z95.811]  Chronic systolic congestive heart failure (H) [I50.22]  Paroxysmal atrial fibrillation (H) " [I48.0]           Comments:  Follow VAD Anticoag protocol:Yes: HeartMate 3   Bridging: Call MD each time   Date VAD placed: 2/18/2020 5/6/22 [employer] [facility]         Anticoagulation Care Providers     Provider Role Specialty Phone number    Nader Cisneros MD Referring Cardiovascular Disease [phone]

## 2022-12-29 NOTE — TELEPHONE ENCOUNTER
"----- Message from Cee Desir RN sent at 12/29/2022  1:09 PM CST -----  Hi,   Yes this is okay to see this VAD pt on Francoise's general day.     Thanks,   Cee   ----- Message -----  From: Cee Aguilar  Sent: 12/29/2022  11:43 AM CST  To: Cee Desir RN, Jessica Lara RN    Is this okay?  ----- Message -----  From: Jessica Lara, KRISTEN  Sent: 12/29/2022  11:33 AM CST  To: Crystal Thompson, #    1/13 should be okay on our end. If \"general schedule\" means no LVADs, then I think we would need to check with Francoise to make sure it's okay.     Marjan    ----- Message -----  From: Cee Aguilar  Sent: 12/29/2022  11:16 AM CST  To: Sharon Mccarthy RN, #    Hello,    This patient needs hospital follow-up, but he will not come up for just that appt. He is wondering if he could do it virtually? Or I saw he is coming her on 1/13 would it be okay to schedule him on Francoise's general schedule?    Cee  ----- Message -----  From: Sharon Mccarthy RN  Sent: 12/26/2022  10:40 AM CST  To: Clinic Coordinators-[facility]-    Good morning team  Can someone please schedule Eliseo for an in-person, hospital, follow up appointment with one of our VAD LISA's & a lab appointment prior to, in the next 1-2 weeks?    Thank you!  Gladys  ----- Message -----  From: Sharon Mccarthy RN  Sent: 12/21/2022   7:57 AM CST  To: Sharon Mccarthy RN      ----- Message -----  From: Laine Leon APRN CNP  Sent: 12/21/2022   7:47 AM CST  To: Cardiology Vad Coordinators-    Morning team,     Eliseo discharged yesterday. He will do IV antibiotics and wound vac care at his local hospital. Please ensure he gets VAD follow up in 1-2 weeks and the following per ID. Repeat INR, CMP, CBC, and LDH 12/22/22.     Weekly labs (fax to [phone]): CBC w/ diff, CMP, CK. ID follow up in 3-4 weeks (to be seen prior to stopping IV abx) to discuss PO suppressive regimen.    ThanksLaine                  "

## 2022-12-29 NOTE — TELEPHONE ENCOUNTER
12/29 lvm for pt to call me back on my desk phone to keyana asencio on 1/13 with his ID appt (OKAY TO TAKE GENERAL SPOT) [place]

## 2022-12-30 NOTE — TELEPHONE ENCOUNTER
12/30 ok by Gladys Jang to manually schedule virtual appt on Laine's sched in the next couple weeks. Lvm on all 3 numbers & mc for pt to sched and left my number NH

## 2022-12-30 NOTE — PROGRESS NOTES
[facility] CORE Clinic - CardioMEMS Reading Review    December 30, 2022, 1500   CardioMems period: 12/19/22 - 1/17/2023      CardioMEMS reviewed and routed to patient's provider, Laine BARRY NP.     Current diuretic: Bumex 6 mg BID.  Hydrochlorothiazide 75 mg every Wednesday and Saturday      Current potassium chloride dose:  Potassium 80mEq BID w/ an extra 60 mEq Potassium on Wednesdays & Saturdays    Symptoms: None, pt feels very well  Weights: between 182 & 179    Changes to plan of care today: No change    Current Threshold parameters: 21 - 24    Today's Waveform:       Readings:         [facility] Date Wedge Pressure MEMS PAD during cath  (average of the 3 values)     Sept 20, 2022 w/MEMS implant     15 mmHg   16 mmHg

## 2022-12-30 NOTE — TELEPHONE ENCOUNTER
"----- Message from Cee Aguilar sent at 12/30/2022  3:11 PM CST -----    ----- Message -----  From: Cee Aguilar  Sent: 12/30/2022   7:50 AM CST  To: Cee Desir, RN, Jessica Lara, RN, #    I have a few different conflicting messages going around about this patient. Is Gladys going to call to try and get him in person or are we just sticking with virtual? If virtual, then we would not have to do it the same day as his other appt as long as Francoise is okay with doing virtual.  ----- Message -----  From: Crystal Goodson  Sent: 12/29/2022   3:48 PM CST  To: Jessica Thompson, RN, #    Cee,    I also echo what Marjan has suggested below: please ask Francoise if she is ok with scheduling a virtual VAD on a non-VAD clinic. I have Cc'd her above.    Thanks for asking!    Crysatl Goodson  [facility]  LVAD   Mechanical Circulatory Support Program  [address], [facility] [address]  [place] [address]  [email or web address]  Office: [phone]  Fax: [phone]  Gender Pronouns: She/Her  ----- Message -----  From: Jessica Lara RN  Sent: 12/29/2022  11:33 AM CST  To: Crystal Thompson, #    1/13 should be okay on our end. If \"general schedule\" means no LVADs, then I think we would need to check with Francoise to make sure it's okay.     Marjan    ----- Message -----  From: Cee Aguilar  Sent: 12/29/2022  11:16 AM CST  To: Sharon Mccarthy, RN, #    Hello,    This patient needs hospital follow-up, but he will not come up for just that appt. He is wondering if he could do it virtually? Or I saw he is coming her on 1/13 would it be okay to schedule him on Francoise's general schedule?    Cee  ----- Message -----  From: Sharon Mccarthy RN  Sent: 12/26/2022  10:40 AM CST  To: Clinic Coordinators-[facility]-    Good morning team  Can someone please schedule Eliseo for an in-person, [facility], follow " up appointment with one of our VAD LISA's & a lab appointment prior to, in the next 1-2 weeks?    Thank you!  Gladys  ----- Message -----  From: Sharon Mccarthy, KRISTEN  Sent: 12/21/2022   7:57 AM CST  To: Sharon Mccarthy RN      ----- Message -----  From: Laine Leon APRN CNP  Sent: 12/21/2022   7:47 AM CST  To: Cardiology Vad Coordinators-    Morning team,     Eliseo discharged yesterday. He will do IV antibiotics and wound vac care at his local hospital. Please ensure he gets VAD follow up in 1-2 weeks and the following per ID. Repeat INR, CMP, CBC, and LDH 12/22/22.     Weekly labs (fax to [phone]): CBC w/ diff, CMP, CK. ID follow up in 3-4 weeks (to be seen prior to stopping IV abx) to discuss PO suppressive regimen.    Thanks,     Laine

## 2022-12-31 NOTE — PROGRESS NOTES
Remote monitoring of Pulmonary Artery Pressures via CardioMEMS device reviewed at least weekly and as needed with nursing with for dates 11/19 through 12/18. Diuretics adjusted accordingly.   Laine Leon, APRN CNP  12/31/2022

## 2023-01-01 ENCOUNTER — TELEPHONE (OUTPATIENT)
Dept: ANTICOAGULATION | Facility: CLINIC | Age: 68
End: 2023-01-01
Payer: MEDICARE

## 2023-01-01 ENCOUNTER — CARE COORDINATION (OUTPATIENT)
Dept: CARDIOLOGY | Facility: CLINIC | Age: 68
End: 2023-01-01
Payer: MEDICARE

## 2023-01-01 ENCOUNTER — DOCUMENTATION ONLY (OUTPATIENT)
Dept: INFECTIOUS DISEASES | Facility: CLINIC | Age: 68
End: 2023-01-01

## 2023-01-01 ENCOUNTER — ANTICOAGULATION THERAPY VISIT (OUTPATIENT)
Dept: ANTICOAGULATION | Facility: CLINIC | Age: 68
End: 2023-01-01
Payer: MEDICARE

## 2023-01-01 ENCOUNTER — APPOINTMENT (OUTPATIENT)
Dept: CARDIOLOGY | Facility: CLINIC | Age: 68
DRG: 292 | End: 2023-01-01
Payer: MEDICARE

## 2023-01-01 ENCOUNTER — APPOINTMENT (OUTPATIENT)
Dept: GENERAL RADIOLOGY | Facility: CLINIC | Age: 68
DRG: 393 | End: 2023-01-01
Attending: NURSE PRACTITIONER
Payer: MEDICARE

## 2023-01-01 ENCOUNTER — APPOINTMENT (OUTPATIENT)
Dept: OCCUPATIONAL THERAPY | Facility: CLINIC | Age: 68
DRG: 287 | End: 2023-01-01
Attending: NURSE PRACTITIONER
Payer: MEDICARE

## 2023-01-01 ENCOUNTER — PATIENT OUTREACH (OUTPATIENT)
Dept: CARE COORDINATION | Facility: CLINIC | Age: 68
End: 2023-01-01
Payer: MEDICARE

## 2023-01-01 ENCOUNTER — DOCUMENTATION ONLY (OUTPATIENT)
Dept: ANTICOAGULATION | Facility: CLINIC | Age: 68
End: 2023-01-01
Payer: MEDICARE

## 2023-01-01 ENCOUNTER — MYC REFILL (OUTPATIENT)
Dept: INFECTIOUS DISEASES | Facility: CLINIC | Age: 68
End: 2023-01-01
Payer: MEDICARE

## 2023-01-01 ENCOUNTER — APPOINTMENT (OUTPATIENT)
Dept: PHYSICAL THERAPY | Facility: CLINIC | Age: 68
DRG: 287 | End: 2023-01-01
Attending: NURSE PRACTITIONER
Payer: MEDICARE

## 2023-01-01 ENCOUNTER — ALLIED HEALTH/NURSE VISIT (OUTPATIENT)
Dept: CARDIOLOGY | Facility: CLINIC | Age: 68
End: 2023-01-01
Attending: INTERNAL MEDICINE
Payer: MEDICARE

## 2023-01-01 ENCOUNTER — TELEPHONE (OUTPATIENT)
Dept: CARDIOLOGY | Facility: CLINIC | Age: 68
End: 2023-01-01
Payer: MEDICARE

## 2023-01-01 ENCOUNTER — APPOINTMENT (OUTPATIENT)
Dept: CT IMAGING | Facility: CLINIC | Age: 68
DRG: 292 | End: 2023-01-01
Payer: MEDICARE

## 2023-01-01 ENCOUNTER — ANCILLARY PROCEDURE (OUTPATIENT)
Dept: CARDIOLOGY | Facility: CLINIC | Age: 68
DRG: 287 | End: 2023-01-01
Attending: INTERNAL MEDICINE
Payer: MEDICARE

## 2023-01-01 ENCOUNTER — ANTICOAGULATION THERAPY VISIT (OUTPATIENT)
Dept: ANTICOAGULATION | Facility: CLINIC | Age: 68
End: 2023-01-01

## 2023-01-01 ENCOUNTER — APPOINTMENT (OUTPATIENT)
Dept: GENERAL RADIOLOGY | Facility: CLINIC | Age: 68
DRG: 291 | End: 2023-01-01
Attending: NURSE PRACTITIONER
Payer: MEDICARE

## 2023-01-01 ENCOUNTER — APPOINTMENT (OUTPATIENT)
Dept: PHYSICAL THERAPY | Facility: CLINIC | Age: 68
DRG: 287 | End: 2023-01-01
Attending: PHYSICIAN ASSISTANT
Payer: MEDICARE

## 2023-01-01 ENCOUNTER — TELEPHONE (OUTPATIENT)
Dept: INFECTIOUS DISEASES | Facility: CLINIC | Age: 68
End: 2023-01-01
Payer: MEDICARE

## 2023-01-01 ENCOUNTER — TELEPHONE (OUTPATIENT)
Dept: INFECTIOUS DISEASES | Facility: CLINIC | Age: 68
End: 2023-01-01

## 2023-01-01 ENCOUNTER — DOCUMENTATION ONLY (OUTPATIENT)
Dept: INFECTIOUS DISEASES | Facility: CLINIC | Age: 68
End: 2023-01-01
Payer: MEDICARE

## 2023-01-01 ENCOUNTER — OFFICE VISIT (OUTPATIENT)
Dept: CARDIOLOGY | Facility: CLINIC | Age: 68
DRG: 292 | End: 2023-01-01
Attending: PHYSICIAN ASSISTANT
Payer: MEDICARE

## 2023-01-01 ENCOUNTER — HOSPITAL ENCOUNTER (INPATIENT)
Facility: CLINIC | Age: 68
LOS: 12 days | Discharge: HOME | DRG: 393 | End: 2023-05-31
Attending: EMERGENCY MEDICINE | Admitting: INTERNAL MEDICINE
Payer: MEDICARE

## 2023-01-01 ENCOUNTER — TRANSFERRED RECORDS (OUTPATIENT)
Dept: HEALTH INFORMATION MANAGEMENT | Facility: CLINIC | Age: 68
End: 2023-01-01

## 2023-01-01 ENCOUNTER — APPOINTMENT (OUTPATIENT)
Dept: OCCUPATIONAL THERAPY | Facility: CLINIC | Age: 68
DRG: 291 | End: 2023-01-01
Attending: NURSE PRACTITIONER
Payer: MEDICARE

## 2023-01-01 ENCOUNTER — ANCILLARY PROCEDURE (OUTPATIENT)
Dept: CARDIOLOGY | Facility: CLINIC | Age: 68
DRG: 291 | End: 2023-01-01
Attending: NURSE PRACTITIONER
Payer: MEDICARE

## 2023-01-01 ENCOUNTER — ANCILLARY PROCEDURE (OUTPATIENT)
Dept: CARDIOLOGY | Facility: CLINIC | Age: 68
DRG: 287 | End: 2023-01-01
Payer: MEDICARE

## 2023-01-01 ENCOUNTER — APPOINTMENT (OUTPATIENT)
Dept: GENERAL RADIOLOGY | Facility: CLINIC | Age: 68
DRG: 287 | End: 2023-01-01
Attending: PHYSICIAN ASSISTANT
Payer: MEDICARE

## 2023-01-01 ENCOUNTER — HOSPITAL ENCOUNTER (OUTPATIENT)
Facility: CLINIC | Age: 68
End: 2023-01-01
Attending: INTERNAL MEDICINE | Admitting: INTERNAL MEDICINE
Payer: MEDICARE

## 2023-01-01 ENCOUNTER — HOSPITAL ENCOUNTER (INPATIENT)
Facility: CLINIC | Age: 68
LOS: 13 days | Discharge: HOME | DRG: 291 | End: 2023-08-09
Attending: EMERGENCY MEDICINE | Admitting: INTERNAL MEDICINE
Payer: MEDICARE

## 2023-01-01 ENCOUNTER — APPOINTMENT (OUTPATIENT)
Dept: PHYSICAL THERAPY | Facility: CLINIC | Age: 68
DRG: 287 | End: 2023-01-01
Payer: MEDICARE

## 2023-01-01 ENCOUNTER — APPOINTMENT (OUTPATIENT)
Dept: GENERAL RADIOLOGY | Facility: CLINIC | Age: 68
DRG: 287 | End: 2023-01-01
Attending: EMERGENCY MEDICINE
Payer: MEDICARE

## 2023-01-01 ENCOUNTER — HEALTH MAINTENANCE LETTER (OUTPATIENT)
Age: 68
End: 2023-01-01

## 2023-01-01 ENCOUNTER — MYC MEDICAL ADVICE (OUTPATIENT)
Dept: CARDIOLOGY | Facility: CLINIC | Age: 68
End: 2023-01-01

## 2023-01-01 ENCOUNTER — LAB (OUTPATIENT)
Dept: LAB | Facility: CLINIC | Age: 68
End: 2023-01-01
Payer: MEDICARE

## 2023-01-01 ENCOUNTER — TELEPHONE (OUTPATIENT)
Dept: PHARMACY | Facility: OTHER | Age: 68
End: 2023-01-01
Payer: MEDICARE

## 2023-01-01 ENCOUNTER — APPOINTMENT (OUTPATIENT)
Dept: GENERAL RADIOLOGY | Facility: CLINIC | Age: 68
DRG: 287 | End: 2023-01-01
Attending: NURSE PRACTITIONER
Payer: MEDICARE

## 2023-01-01 ENCOUNTER — MYC MEDICAL ADVICE (OUTPATIENT)
Dept: CARDIOLOGY | Facility: CLINIC | Age: 68
End: 2023-01-01
Payer: MEDICARE

## 2023-01-01 ENCOUNTER — APPOINTMENT (OUTPATIENT)
Dept: NEUROLOGY | Facility: CLINIC | Age: 68
DRG: 393 | End: 2023-01-01
Attending: STUDENT IN AN ORGANIZED HEALTH CARE EDUCATION/TRAINING PROGRAM
Payer: MEDICARE

## 2023-01-01 ENCOUNTER — MYC MEDICAL ADVICE (OUTPATIENT)
Dept: INFECTIOUS DISEASES | Facility: CLINIC | Age: 68
End: 2023-01-01
Payer: MEDICARE

## 2023-01-01 ENCOUNTER — APPOINTMENT (OUTPATIENT)
Dept: GENERAL RADIOLOGY | Facility: CLINIC | Age: 68
DRG: 287 | End: 2023-01-01
Attending: STUDENT IN AN ORGANIZED HEALTH CARE EDUCATION/TRAINING PROGRAM
Payer: MEDICARE

## 2023-01-01 ENCOUNTER — APPOINTMENT (OUTPATIENT)
Dept: PHYSICAL THERAPY | Facility: CLINIC | Age: 68
DRG: 393 | End: 2023-01-01
Payer: MEDICARE

## 2023-01-01 ENCOUNTER — HOSPITAL ENCOUNTER (INPATIENT)
Facility: CLINIC | Age: 68
LOS: 16 days | Discharge: HOME | DRG: 287 | End: 2023-05-03
Attending: STUDENT IN AN ORGANIZED HEALTH CARE EDUCATION/TRAINING PROGRAM | Admitting: INTERNAL MEDICINE
Payer: MEDICARE

## 2023-01-01 ENCOUNTER — APPOINTMENT (OUTPATIENT)
Dept: OCCUPATIONAL THERAPY | Facility: CLINIC | Age: 68
DRG: 393 | End: 2023-01-01
Payer: MEDICARE

## 2023-01-01 ENCOUNTER — APPOINTMENT (OUTPATIENT)
Dept: OCCUPATIONAL THERAPY | Facility: CLINIC | Age: 68
DRG: 287 | End: 2023-01-01
Attending: INTERNAL MEDICINE
Payer: MEDICARE

## 2023-01-01 ENCOUNTER — HOME INFUSION (PRE-WILLOW HOME INFUSION) (OUTPATIENT)
Dept: PHARMACY | Facility: CLINIC | Age: 68
End: 2023-01-01
Payer: MEDICARE

## 2023-01-01 ENCOUNTER — APPOINTMENT (OUTPATIENT)
Dept: CT IMAGING | Facility: CLINIC | Age: 68
DRG: 291 | End: 2023-01-01
Attending: NURSE PRACTITIONER
Payer: MEDICARE

## 2023-01-01 ENCOUNTER — CARE COORDINATION (OUTPATIENT)
Dept: CARDIOLOGY | Facility: CLINIC | Age: 68
End: 2023-01-01

## 2023-01-01 ENCOUNTER — APPOINTMENT (OUTPATIENT)
Dept: INTERVENTIONAL RADIOLOGY/VASCULAR | Facility: CLINIC | Age: 68
DRG: 287 | End: 2023-01-01
Attending: NURSE PRACTITIONER
Payer: MEDICARE

## 2023-01-01 ENCOUNTER — VIRTUAL VISIT (OUTPATIENT)
Dept: INFECTIOUS DISEASES | Facility: CLINIC | Age: 68
End: 2023-01-01
Attending: STUDENT IN AN ORGANIZED HEALTH CARE EDUCATION/TRAINING PROGRAM
Payer: MEDICARE

## 2023-01-01 ENCOUNTER — VIRTUAL VISIT (OUTPATIENT)
Dept: CARDIOLOGY | Facility: CLINIC | Age: 68
End: 2023-01-01
Attending: NURSE PRACTITIONER
Payer: MEDICARE

## 2023-01-01 ENCOUNTER — HOSPITAL ENCOUNTER (OUTPATIENT)
Age: 68
End: 2023-01-01
Attending: INTERNAL MEDICINE
Payer: MEDICARE

## 2023-01-01 ENCOUNTER — TRANSFERRED RECORDS (OUTPATIENT)
Dept: HEALTH INFORMATION MANAGEMENT | Facility: CLINIC | Age: 68
End: 2023-01-01
Payer: MEDICARE

## 2023-01-01 ENCOUNTER — APPOINTMENT (OUTPATIENT)
Dept: PHYSICAL THERAPY | Facility: CLINIC | Age: 68
DRG: 291 | End: 2023-01-01
Attending: NURSE PRACTITIONER
Payer: MEDICARE

## 2023-01-01 ENCOUNTER — APPOINTMENT (OUTPATIENT)
Dept: OCCUPATIONAL THERAPY | Facility: CLINIC | Age: 68
DRG: 287 | End: 2023-01-01
Attending: PHYSICIAN ASSISTANT
Payer: MEDICARE

## 2023-01-01 ENCOUNTER — ANCILLARY PROCEDURE (OUTPATIENT)
Dept: CARDIOLOGY | Facility: CLINIC | Age: 68
DRG: 287 | End: 2023-01-01
Attending: NURSE PRACTITIONER
Payer: MEDICARE

## 2023-01-01 ENCOUNTER — VIRTUAL VISIT (OUTPATIENT)
Dept: INFECTIOUS DISEASES | Facility: CLINIC | Age: 68
End: 2023-01-01
Attending: INTERNAL MEDICINE
Payer: MEDICARE

## 2023-01-01 ENCOUNTER — HOSPITAL ENCOUNTER (INPATIENT)
Facility: CLINIC | Age: 68
LOS: 9 days | Discharge: HOME | DRG: 287 | End: 2023-06-29
Attending: EMERGENCY MEDICINE | Admitting: INTERNAL MEDICINE
Payer: MEDICARE

## 2023-01-01 ENCOUNTER — APPOINTMENT (OUTPATIENT)
Dept: CT IMAGING | Facility: CLINIC | Age: 68
DRG: 393 | End: 2023-01-01
Attending: NURSE PRACTITIONER
Payer: MEDICARE

## 2023-01-01 ENCOUNTER — APPOINTMENT (OUTPATIENT)
Dept: CT IMAGING | Facility: CLINIC | Age: 68
DRG: 393 | End: 2023-01-01
Payer: MEDICARE

## 2023-01-01 ENCOUNTER — DOCUMENTATION ONLY (OUTPATIENT)
Dept: ANTICOAGULATION | Facility: CLINIC | Age: 68
End: 2023-01-01

## 2023-01-01 ENCOUNTER — APPOINTMENT (OUTPATIENT)
Dept: OCCUPATIONAL THERAPY | Facility: CLINIC | Age: 68
DRG: 393 | End: 2023-01-01
Attending: PHYSICIAN ASSISTANT
Payer: MEDICARE

## 2023-01-01 ENCOUNTER — DOCUMENTATION ONLY (OUTPATIENT)
Dept: OTHER | Facility: CLINIC | Age: 68
End: 2023-01-01
Payer: MEDICARE

## 2023-01-01 ENCOUNTER — VIRTUAL VISIT (OUTPATIENT)
Dept: INFECTIOUS DISEASES | Facility: CLINIC | Age: 68
DRG: 287 | End: 2023-01-01
Attending: INTERNAL MEDICINE
Payer: MEDICARE

## 2023-01-01 ENCOUNTER — DOCUMENTATION ONLY (OUTPATIENT)
Dept: MULTI SPECIALTY CLINIC | Facility: CLINIC | Age: 68
End: 2023-01-01
Payer: MEDICARE

## 2023-01-01 ENCOUNTER — APPOINTMENT (OUTPATIENT)
Dept: CT IMAGING | Facility: CLINIC | Age: 68
DRG: 287 | End: 2023-01-01
Attending: NURSE PRACTITIONER
Payer: MEDICARE

## 2023-01-01 ENCOUNTER — APPOINTMENT (OUTPATIENT)
Dept: EDUCATION SERVICES | Facility: CLINIC | Age: 68
DRG: 287 | End: 2023-01-01
Attending: PHYSICIAN ASSISTANT
Payer: MEDICARE

## 2023-01-01 ENCOUNTER — APPOINTMENT (OUTPATIENT)
Dept: GENERAL RADIOLOGY | Facility: CLINIC | Age: 68
DRG: 393 | End: 2023-01-01
Attending: PHYSICIAN ASSISTANT
Payer: MEDICARE

## 2023-01-01 ENCOUNTER — APPOINTMENT (OUTPATIENT)
Dept: OCCUPATIONAL THERAPY | Facility: CLINIC | Age: 68
DRG: 292 | End: 2023-01-01
Payer: MEDICARE

## 2023-01-01 ENCOUNTER — MYC MEDICAL ADVICE (OUTPATIENT)
Dept: INFECTIOUS DISEASES | Facility: CLINIC | Age: 68
End: 2023-01-01

## 2023-01-01 ENCOUNTER — CARE COORDINATION (OUTPATIENT)
Dept: TRANSPLANT | Facility: CLINIC | Age: 68
End: 2023-01-01
Payer: MEDICARE

## 2023-01-01 ENCOUNTER — APPOINTMENT (OUTPATIENT)
Dept: CARDIOLOGY | Facility: CLINIC | Age: 68
DRG: 287 | End: 2023-01-01
Attending: PHYSICIAN ASSISTANT
Payer: MEDICARE

## 2023-01-01 ENCOUNTER — APPOINTMENT (OUTPATIENT)
Dept: GENERAL RADIOLOGY | Facility: CLINIC | Age: 68
DRG: 291 | End: 2023-01-01
Attending: EMERGENCY MEDICINE
Payer: MEDICARE

## 2023-01-01 ENCOUNTER — INFUSION THERAPY VISIT (OUTPATIENT)
Dept: INFUSION THERAPY | Facility: CLINIC | Age: 68
End: 2023-01-01
Attending: STUDENT IN AN ORGANIZED HEALTH CARE EDUCATION/TRAINING PROGRAM
Payer: MEDICARE

## 2023-01-01 ENCOUNTER — APPOINTMENT (OUTPATIENT)
Dept: ULTRASOUND IMAGING | Facility: CLINIC | Age: 68
DRG: 287 | End: 2023-01-01
Attending: PHYSICIAN ASSISTANT
Payer: MEDICARE

## 2023-01-01 ENCOUNTER — APPOINTMENT (OUTPATIENT)
Dept: OCCUPATIONAL THERAPY | Facility: CLINIC | Age: 68
DRG: 287 | End: 2023-01-01
Payer: MEDICARE

## 2023-01-01 ENCOUNTER — APPOINTMENT (OUTPATIENT)
Dept: CT IMAGING | Facility: CLINIC | Age: 68
DRG: 393 | End: 2023-01-01
Attending: PHYSICIAN ASSISTANT
Payer: MEDICARE

## 2023-01-01 ENCOUNTER — DOCUMENTATION ONLY (OUTPATIENT)
Dept: CARDIOLOGY | Facility: CLINIC | Age: 68
End: 2023-01-01

## 2023-01-01 ENCOUNTER — HOSPITAL ENCOUNTER (INPATIENT)
Facility: CLINIC | Age: 68
LOS: 7 days | Discharge: HOME | DRG: 292 | End: 2023-02-14
Attending: EMERGENCY MEDICINE | Admitting: INTERNAL MEDICINE
Payer: MEDICARE

## 2023-01-01 ENCOUNTER — HOME INFUSION (PRE-WILLOW HOME INFUSION) (OUTPATIENT)
Dept: PHARMACY | Facility: CLINIC | Age: 68
End: 2023-01-01

## 2023-01-01 ENCOUNTER — HOSPITAL ENCOUNTER (INPATIENT)
Facility: CLINIC | Age: 68
LOS: 5 days | Discharge: HOME | DRG: 292 | End: 2023-03-28
Attending: EMERGENCY MEDICINE
Payer: MEDICARE

## 2023-01-01 ENCOUNTER — APPOINTMENT (OUTPATIENT)
Dept: ULTRASOUND IMAGING | Facility: CLINIC | Age: 68
DRG: 292 | End: 2023-01-01
Payer: MEDICARE

## 2023-01-01 ENCOUNTER — APPOINTMENT (OUTPATIENT)
Dept: GENERAL RADIOLOGY | Facility: CLINIC | Age: 68
DRG: 292 | End: 2023-01-01
Payer: MEDICARE

## 2023-01-01 ENCOUNTER — OFFICE VISIT (OUTPATIENT)
Dept: CARDIOLOGY | Facility: OTHER | Age: 68
End: 2023-01-01
Payer: MEDICARE

## 2023-01-01 VITALS
OXYGEN SATURATION: 97 % | HEART RATE: 72 BPM | WEIGHT: 201.9 LBS | BODY MASS INDEX: 32.45 KG/M2 | HEIGHT: 66 IN | SYSTOLIC BLOOD PRESSURE: 86 MMHG

## 2023-01-01 VITALS
TEMPERATURE: 97.9 F | SYSTOLIC BLOOD PRESSURE: 91 MMHG | WEIGHT: 176.8 LBS | BODY MASS INDEX: 27.75 KG/M2 | HEIGHT: 67 IN | RESPIRATION RATE: 14 BRPM | OXYGEN SATURATION: 95 % | HEART RATE: 64 BPM | DIASTOLIC BLOOD PRESSURE: 48 MMHG

## 2023-01-01 VITALS
SYSTOLIC BLOOD PRESSURE: 109 MMHG | TEMPERATURE: 98.5 F | RESPIRATION RATE: 16 BRPM | OXYGEN SATURATION: 96 % | HEART RATE: 79 BPM | DIASTOLIC BLOOD PRESSURE: 79 MMHG

## 2023-01-01 VITALS
TEMPERATURE: 97.7 F | RESPIRATION RATE: 16 BRPM | HEART RATE: 109 BPM | BODY MASS INDEX: 28.87 KG/M2 | OXYGEN SATURATION: 99 % | DIASTOLIC BLOOD PRESSURE: 60 MMHG | SYSTOLIC BLOOD PRESSURE: 73 MMHG | WEIGHT: 184.3 LBS

## 2023-01-01 VITALS
WEIGHT: 185.41 LBS | DIASTOLIC BLOOD PRESSURE: 60 MMHG | BODY MASS INDEX: 29.1 KG/M2 | HEART RATE: 96 BPM | HEIGHT: 67 IN | OXYGEN SATURATION: 93 % | TEMPERATURE: 97.6 F | RESPIRATION RATE: 18 BRPM | SYSTOLIC BLOOD PRESSURE: 82 MMHG

## 2023-01-01 VITALS
BODY MASS INDEX: 30.13 KG/M2 | SYSTOLIC BLOOD PRESSURE: 100 MMHG | OXYGEN SATURATION: 97 % | WEIGHT: 192.4 LBS | HEART RATE: 66 BPM

## 2023-01-01 VITALS
DIASTOLIC BLOOD PRESSURE: 62 MMHG | SYSTOLIC BLOOD PRESSURE: 75 MMHG | RESPIRATION RATE: 17 BRPM | OXYGEN SATURATION: 100 % | WEIGHT: 176.37 LBS | HEART RATE: 91 BPM | HEIGHT: 67 IN | TEMPERATURE: 97.9 F | BODY MASS INDEX: 27.68 KG/M2

## 2023-01-01 VITALS
SYSTOLIC BLOOD PRESSURE: 100 MMHG | DIASTOLIC BLOOD PRESSURE: 86 MMHG | TEMPERATURE: 97.7 F | OXYGEN SATURATION: 95 % | HEART RATE: 63 BPM | RESPIRATION RATE: 16 BRPM | WEIGHT: 177.1 LBS | HEIGHT: 67 IN | BODY MASS INDEX: 27.8 KG/M2

## 2023-01-01 VITALS — WEIGHT: 194.2 LBS | TEMPERATURE: 98 F | HEART RATE: 50 BPM | OXYGEN SATURATION: 97 % | BODY MASS INDEX: 30.42 KG/M2

## 2023-01-01 VITALS — WEIGHT: 187 LBS | BODY MASS INDEX: 29.29 KG/M2

## 2023-01-01 VITALS
SYSTOLIC BLOOD PRESSURE: 108 MMHG | OXYGEN SATURATION: 95 % | RESPIRATION RATE: 18 BRPM | HEART RATE: 100 BPM | BODY MASS INDEX: 29.6 KG/M2 | TEMPERATURE: 98.4 F | WEIGHT: 188.6 LBS | DIASTOLIC BLOOD PRESSURE: 70 MMHG | HEIGHT: 67 IN

## 2023-01-01 DIAGNOSIS — Z79.2 ENCOUNTER FOR LONG-TERM (CURRENT) USE OF ANTIBIOTICS: ICD-10-CM

## 2023-01-01 DIAGNOSIS — Z95.811 LVAD (LEFT VENTRICULAR ASSIST DEVICE) PRESENT (H): Primary | ICD-10-CM

## 2023-01-01 DIAGNOSIS — I48.0 PAROXYSMAL ATRIAL FIBRILLATION (H): ICD-10-CM

## 2023-01-01 DIAGNOSIS — Z95.811 LVAD (LEFT VENTRICULAR ASSIST DEVICE) PRESENT (H): ICD-10-CM

## 2023-01-01 DIAGNOSIS — I50.22 CHRONIC SYSTOLIC CONGESTIVE HEART FAILURE (H): ICD-10-CM

## 2023-01-01 DIAGNOSIS — A49.8 PSEUDOMONAS AERUGINOSA INFECTION: ICD-10-CM

## 2023-01-01 DIAGNOSIS — E03.9 HYPOTHYROIDISM, UNSPECIFIED TYPE: ICD-10-CM

## 2023-01-01 DIAGNOSIS — T82.7XXA INFECTION ASSOCIATED WITH DRIVELINE OF LEFT VENTRICULAR ASSIST DEVICE (LVAD) (H): Primary | ICD-10-CM

## 2023-01-01 DIAGNOSIS — L08.9 WOUND INFECTION: ICD-10-CM

## 2023-01-01 DIAGNOSIS — I50.9 ACUTE ON CHRONIC CONGESTIVE HEART FAILURE, UNSPECIFIED HEART FAILURE TYPE (H): ICD-10-CM

## 2023-01-01 DIAGNOSIS — A49.01 MSSA (METHICILLIN SUSCEPTIBLE STAPHYLOCOCCUS AUREUS) INFECTION: ICD-10-CM

## 2023-01-01 DIAGNOSIS — Z79.2 CHRONIC ANTIBIOTIC SUPPRESSION: Primary | ICD-10-CM

## 2023-01-01 DIAGNOSIS — I50.84 ACC/AHA STAGE D HEART FAILURE (H): ICD-10-CM

## 2023-01-01 DIAGNOSIS — T82.7XXA INFECTION ASSOCIATED WITH DRIVELINE OF LEFT VENTRICULAR ASSIST DEVICE (LVAD) (H): ICD-10-CM

## 2023-01-01 DIAGNOSIS — I42.8 OBSCURE CARDIOMYOPATHY OF AFRICA (H): ICD-10-CM

## 2023-01-01 DIAGNOSIS — I50.20 HEART FAILURE WITH REDUCED EJECTION FRACTION, NYHA CLASS III (H): ICD-10-CM

## 2023-01-01 DIAGNOSIS — I50.22 CHRONIC SYSTOLIC CONGESTIVE HEART FAILURE (H): Primary | ICD-10-CM

## 2023-01-01 DIAGNOSIS — I50.23 ACUTE ON CHRONIC SYSTOLIC CONGESTIVE HEART FAILURE (H): ICD-10-CM

## 2023-01-01 DIAGNOSIS — Z95.811 HEART REPLACED BY HEART ASSIST DEVICE (H): ICD-10-CM

## 2023-01-01 DIAGNOSIS — E87.70 HYPERVOLEMIA, UNSPECIFIED HYPERVOLEMIA TYPE: ICD-10-CM

## 2023-01-01 DIAGNOSIS — Z79.2 CHRONIC ANTIBIOTIC SUPPRESSION: ICD-10-CM

## 2023-01-01 DIAGNOSIS — Z79.899 LONG TERM USE OF DRUG: ICD-10-CM

## 2023-01-01 DIAGNOSIS — T14.8XXA WOUND INFECTION: ICD-10-CM

## 2023-01-01 DIAGNOSIS — B99.9 INFECTION: ICD-10-CM

## 2023-01-01 DIAGNOSIS — D64.9 ANEMIA, UNSPECIFIED TYPE: ICD-10-CM

## 2023-01-01 DIAGNOSIS — I50.23 ACUTE ON CHRONIC SYSTOLIC CONGESTIVE HEART FAILURE (H): Primary | ICD-10-CM

## 2023-01-01 DIAGNOSIS — I50.814 RIGHT HEART FAILURE SECONDARY TO LEFT HEART FAILURE (H): Primary | ICD-10-CM

## 2023-01-01 DIAGNOSIS — N18.4 CKD (CHRONIC KIDNEY DISEASE) STAGE 4, GFR 15-29 ML/MIN (H): ICD-10-CM

## 2023-01-01 DIAGNOSIS — R06.02 SHORTNESS OF BREATH: ICD-10-CM

## 2023-01-01 DIAGNOSIS — I50.814 RIGHT HEART FAILURE SECONDARY TO LEFT HEART FAILURE (H): ICD-10-CM

## 2023-01-01 DIAGNOSIS — I42.8 NONISCHEMIC CARDIOMYOPATHY (H): ICD-10-CM

## 2023-01-01 DIAGNOSIS — I50.22 CHRONIC SYSTOLIC HEART FAILURE (H): ICD-10-CM

## 2023-01-01 DIAGNOSIS — Z95.811 LEFT VENTRICULAR ASSIST DEVICE PRESENT (H): ICD-10-CM

## 2023-01-01 DIAGNOSIS — Z95.810 PRESENCE OF AUTOMATIC CARDIOVERTER/DEFIBRILLATOR (AICD): ICD-10-CM

## 2023-01-01 DIAGNOSIS — R52 PAIN: ICD-10-CM

## 2023-01-01 DIAGNOSIS — N17.9 AKI (ACUTE KIDNEY INJURY) (H): ICD-10-CM

## 2023-01-01 DIAGNOSIS — I50.22 CHRONIC SYSTOLIC (CONGESTIVE) HEART FAILURE (H): Primary | ICD-10-CM

## 2023-01-01 DIAGNOSIS — N17.0 ACUTE KIDNEY INJURY (AKI) WITH ACUTE TUBULAR NECROSIS (ATN) (H): ICD-10-CM

## 2023-01-01 DIAGNOSIS — Z11.52 ENCOUNTER FOR SCREENING LABORATORY TESTING FOR SEVERE ACUTE RESPIRATORY SYNDROME CORONAVIRUS 2 (SARS-COV-2): ICD-10-CM

## 2023-01-01 DIAGNOSIS — E87.70 HYPERVOLEMIA, UNSPECIFIED HYPERVOLEMIA TYPE: Primary | ICD-10-CM

## 2023-01-01 DIAGNOSIS — T82.9XXS LEFT VENTRICULAR ASSIST DEVICE (LVAD) COMPLICATION, SEQUELA: ICD-10-CM

## 2023-01-01 DIAGNOSIS — F41.9 ANXIETY: ICD-10-CM

## 2023-01-01 DIAGNOSIS — I10 BENIGN ESSENTIAL HYPERTENSION: Primary | ICD-10-CM

## 2023-01-01 DIAGNOSIS — I50.9 ACUTE ON CHRONIC CONGESTIVE HEART FAILURE, UNSPECIFIED HEART FAILURE TYPE (H): Primary | ICD-10-CM

## 2023-01-01 DIAGNOSIS — E87.1 HYPONATREMIA: ICD-10-CM

## 2023-01-01 DIAGNOSIS — E78.2 MIXED HYPERLIPIDEMIA: ICD-10-CM

## 2023-01-01 DIAGNOSIS — K59.09 OTHER CONSTIPATION: ICD-10-CM

## 2023-01-01 DIAGNOSIS — Z51.81 ENCOUNTER FOR THERAPEUTIC DRUG MONITORING: Primary | ICD-10-CM

## 2023-01-01 DIAGNOSIS — Z09 HOSPITAL DISCHARGE FOLLOW-UP: ICD-10-CM

## 2023-01-01 DIAGNOSIS — I50.9 HEART FAILURE, UNSPECIFIED HF CHRONICITY, UNSPECIFIED HEART FAILURE TYPE (H): ICD-10-CM

## 2023-01-01 DIAGNOSIS — I50.22 CHRONIC SYSTOLIC (CONGESTIVE) HEART FAILURE (H): ICD-10-CM

## 2023-01-01 DIAGNOSIS — N18.31 STAGE 3A CHRONIC KIDNEY DISEASE (H): ICD-10-CM

## 2023-01-01 DIAGNOSIS — Z20.822 LAB TEST NEGATIVE FOR COVID-19 VIRUS: ICD-10-CM

## 2023-01-01 DIAGNOSIS — K90.9 DIARRHEA DUE TO MALABSORPTION: ICD-10-CM

## 2023-01-01 DIAGNOSIS — R19.7 DIARRHEA DUE TO MALABSORPTION: ICD-10-CM

## 2023-01-01 DIAGNOSIS — G47.09 OTHER INSOMNIA: ICD-10-CM

## 2023-01-01 DIAGNOSIS — D50.9 IRON DEFICIENCY ANEMIA, UNSPECIFIED IRON DEFICIENCY ANEMIA TYPE: Primary | ICD-10-CM

## 2023-01-01 DIAGNOSIS — N17.9 ACUTE RENAL FAILURE, UNSPECIFIED ACUTE RENAL FAILURE TYPE (H): ICD-10-CM

## 2023-01-01 DIAGNOSIS — A04.72 COLITIS DUE TO CLOSTRIDIUM DIFFICILE: ICD-10-CM

## 2023-01-01 DIAGNOSIS — N18.30 STAGE 3 CHRONIC KIDNEY DISEASE, UNSPECIFIED WHETHER STAGE 3A OR 3B CKD (H): ICD-10-CM

## 2023-01-01 LAB
ABO/RH(D): NORMAL
ALBUMIN SERPL BCG-MCNC: 2.8 G/DL (ref 3.5–5.2)
ALBUMIN SERPL BCG-MCNC: 3 G/DL (ref 3.5–5.2)
ALBUMIN SERPL BCG-MCNC: 3 G/DL (ref 3.5–5.2)
ALBUMIN SERPL BCG-MCNC: 3.1 G/DL (ref 3.5–5.2)
ALBUMIN SERPL BCG-MCNC: 3.2 G/DL (ref 3.5–5.2)
ALBUMIN SERPL BCG-MCNC: 3.2 G/DL (ref 3.5–5.2)
ALBUMIN SERPL BCG-MCNC: 3.3 G/DL (ref 3.5–5.2)
ALBUMIN SERPL BCG-MCNC: 3.3 G/DL (ref 3.5–5.2)
ALBUMIN SERPL BCG-MCNC: 3.5 G/DL (ref 3.5–5.2)
ALBUMIN SERPL BCG-MCNC: 3.5 G/DL (ref 3.5–5.2)
ALBUMIN SERPL BCG-MCNC: 3.6 G/DL (ref 3.5–5.2)
ALBUMIN SERPL BCG-MCNC: 3.7 G/DL (ref 3.5–5.2)
ALBUMIN SERPL BCG-MCNC: 3.7 G/DL (ref 3.5–5.2)
ALBUMIN UR-MCNC: 10 MG/DL
ALBUMIN UR-MCNC: 100 MG/DL
ALBUMIN UR-MCNC: 20 MG/DL
ALBUMIN UR-MCNC: NEGATIVE MG/DL
ALDOST SERPL-MCNC: 60.1 NG/DL (ref 0–31)
ALDOST/RENIN PLAS-RTO: 1.6 {RATIO} (ref 0–25)
ALP SERPL-CCNC: 102 U/L (ref 40–129)
ALP SERPL-CCNC: 108 U/L (ref 40–129)
ALP SERPL-CCNC: 121 U/L (ref 40–129)
ALP SERPL-CCNC: 135 U/L (ref 40–129)
ALP SERPL-CCNC: 140 U/L (ref 40–129)
ALP SERPL-CCNC: 170 U/L (ref 40–129)
ALP SERPL-CCNC: 188 U/L (ref 40–129)
ALP SERPL-CCNC: 188 U/L (ref 40–129)
ALP SERPL-CCNC: 216 U/L (ref 40–129)
ALP SERPL-CCNC: 289 U/L (ref 40–129)
ALP SERPL-CCNC: 307 U/L (ref 40–129)
ALP SERPL-CCNC: 310 U/L (ref 40–129)
ALP SERPL-CCNC: 339 U/L (ref 40–129)
ALP SERPL-CCNC: 99 U/L (ref 40–129)
ALT SERPL W P-5'-P-CCNC: 34 U/L (ref 0–70)
ALT SERPL W P-5'-P-CCNC: 36 U/L (ref 0–70)
ALT SERPL W P-5'-P-CCNC: 39 U/L (ref 0–70)
ALT SERPL W P-5'-P-CCNC: 44 U/L (ref 0–70)
ALT SERPL W P-5'-P-CCNC: 50 U/L (ref 10–50)
ALT SERPL W P-5'-P-CCNC: 52 U/L (ref 10–50)
ALT SERPL W P-5'-P-CCNC: 53 U/L (ref 10–50)
ALT SERPL W P-5'-P-CCNC: 77 U/L (ref 10–50)
ALT SERPL W P-5'-P-CCNC: 84 U/L (ref 10–50)
ALT SERPL W P-5'-P-CCNC: 85 U/L (ref 10–50)
ALT SERPL W P-5'-P-CCNC: 86 U/L (ref 10–50)
ALT SERPL W P-5'-P-CCNC: 88 U/L (ref 10–50)
ALT SERPL W P-5'-P-CCNC: 89 U/L (ref 10–50)
ALT SERPL W P-5'-P-CCNC: 92 U/L (ref 10–50)
AMMONIA PLAS-SCNC: 38 UMOL/L (ref 16–60)
ANION GAP SERPL CALCULATED.3IONS-SCNC: 10 MMOL/L (ref 7–15)
ANION GAP SERPL CALCULATED.3IONS-SCNC: 11 MMOL/L (ref 7–15)
ANION GAP SERPL CALCULATED.3IONS-SCNC: 12 MMOL/L (ref 7–15)
ANION GAP SERPL CALCULATED.3IONS-SCNC: 13 MMOL/L (ref 7–15)
ANION GAP SERPL CALCULATED.3IONS-SCNC: 14 MMOL/L (ref 7–15)
ANION GAP SERPL CALCULATED.3IONS-SCNC: 15 MMOL/L (ref 7–15)
ANION GAP SERPL CALCULATED.3IONS-SCNC: 16 MMOL/L (ref 7–15)
ANION GAP SERPL CALCULATED.3IONS-SCNC: 17 MMOL/L (ref 7–15)
ANION GAP SERPL CALCULATED.3IONS-SCNC: 18 MMOL/L (ref 7–15)
ANION GAP SERPL CALCULATED.3IONS-SCNC: 19 MMOL/L (ref 7–15)
ANION GAP SERPL CALCULATED.3IONS-SCNC: 20 MMOL/L (ref 7–15)
ANTIBODY SCREEN: NEGATIVE
APPEARANCE UR: CLEAR
APTT PPP: 38 SECONDS (ref 22–38)
APTT PPP: 43 SECONDS (ref 22–38)
AST SERPL W P-5'-P-CCNC: 112 U/L (ref 10–50)
AST SERPL W P-5'-P-CCNC: 122 U/L (ref 10–50)
AST SERPL W P-5'-P-CCNC: 133 U/L (ref 10–50)
AST SERPL W P-5'-P-CCNC: 141 U/L (ref 10–50)
AST SERPL W P-5'-P-CCNC: 146 U/L (ref 10–50)
AST SERPL W P-5'-P-CCNC: 148 U/L (ref 10–50)
AST SERPL W P-5'-P-CCNC: 46 U/L (ref 0–45)
AST SERPL W P-5'-P-CCNC: 50 U/L (ref 0–45)
AST SERPL W P-5'-P-CCNC: 54 U/L (ref 0–45)
AST SERPL W P-5'-P-CCNC: 64 U/L (ref 0–45)
AST SERPL W P-5'-P-CCNC: 66 U/L (ref 10–50)
AST SERPL W P-5'-P-CCNC: 69 U/L (ref 10–50)
AST SERPL W P-5'-P-CCNC: 78 U/L (ref 10–50)
AST SERPL W P-5'-P-CCNC: ABNORMAL U/L
ATRIAL RATE - MUSE: 0 BPM
ATRIAL RATE - MUSE: 100 BPM
ATRIAL RATE - MUSE: 105 BPM
ATRIAL RATE - MUSE: 27 BPM
ATRIAL RATE - MUSE: 66 BPM
ATRIAL RATE - MUSE: 69 BPM
ATRIAL RATE - MUSE: 92 BPM
ATRIAL RATE - MUSE: 94 BPM
ATRIAL RATE - MUSE: 97 BPM
ATRIAL RATE - MUSE: NORMAL BPM
BACTERIA ABSC ANAEROBE+AEROBE CULT: ABNORMAL
BACTERIA BLD CULT: NO GROWTH
BACTERIA SPEC CULT: NO GROWTH
BACTERIA WND CULT: ABNORMAL
BACTERIA WND CULT: NO GROWTH
BACTERIA WND CULT: NORMAL
BASE EXCESS BLDV CALC-SCNC: -5.4 MMOL/L (ref -7.7–1.9)
BASE EXCESS BLDV CALC-SCNC: 7 MMOL/L (ref -7.7–1.9)
BASOPHILS # BLD AUTO: 0 10E3/UL (ref 0–0.2)
BASOPHILS # BLD AUTO: 0.1 10E3/UL (ref 0–0.2)
BASOPHILS NFR BLD AUTO: 0 %
BASOPHILS NFR BLD AUTO: 1 %
BILIRUB DIRECT SERPL-MCNC: 0.21 MG/DL (ref 0–0.3)
BILIRUB DIRECT SERPL-MCNC: 0.21 MG/DL (ref 0–0.3)
BILIRUB DIRECT SERPL-MCNC: 0.24 MG/DL (ref 0–0.3)
BILIRUB DIRECT SERPL-MCNC: 0.27 MG/DL (ref 0–0.3)
BILIRUB DIRECT SERPL-MCNC: 0.31 MG/DL (ref 0–0.3)
BILIRUB DIRECT SERPL-MCNC: 0.32 MG/DL (ref 0–0.3)
BILIRUB DIRECT SERPL-MCNC: <0.2 MG/DL (ref 0–0.3)
BILIRUB SERPL-MCNC: 0.3 MG/DL
BILIRUB SERPL-MCNC: 0.4 MG/DL
BILIRUB SERPL-MCNC: 0.5 MG/DL
BILIRUB SERPL-MCNC: 0.6 MG/DL
BILIRUB SERPL-MCNC: 0.7 MG/DL
BILIRUB SERPL-MCNC: 0.7 MG/DL
BILIRUB SERPL-MCNC: 0.9 MG/DL
BILIRUB UR QL STRIP: NEGATIVE
BLD PROD TYP BPU: NORMAL
BLOOD COMPONENT TYPE: NORMAL
BUN SERPL-MCNC: 100 MG/DL (ref 8–23)
BUN SERPL-MCNC: 101 MG/DL (ref 8–23)
BUN SERPL-MCNC: 101 MG/DL (ref 8–23)
BUN SERPL-MCNC: 103 MG/DL (ref 8–23)
BUN SERPL-MCNC: 103 MG/DL (ref 8–23)
BUN SERPL-MCNC: 104 MG/DL (ref 8–23)
BUN SERPL-MCNC: 105 MG/DL (ref 8–23)
BUN SERPL-MCNC: 106 MG/DL (ref 8–23)
BUN SERPL-MCNC: 106 MG/DL (ref 8–23)
BUN SERPL-MCNC: 108 MG/DL (ref 8–23)
BUN SERPL-MCNC: 110 MG/DL (ref 8–23)
BUN SERPL-MCNC: 30 MG/DL (ref 8–23)
BUN SERPL-MCNC: 31.2 MG/DL (ref 8–23)
BUN SERPL-MCNC: 32.3 MG/DL (ref 8–23)
BUN SERPL-MCNC: 32.9 MG/DL (ref 8–23)
BUN SERPL-MCNC: 32.9 MG/DL (ref 8–23)
BUN SERPL-MCNC: 33.8 MG/DL (ref 8–23)
BUN SERPL-MCNC: 34 MG/DL (ref 8–23)
BUN SERPL-MCNC: 34.5 MG/DL (ref 8–23)
BUN SERPL-MCNC: 35.2 MG/DL (ref 8–23)
BUN SERPL-MCNC: 36.4 MG/DL (ref 8–23)
BUN SERPL-MCNC: 37.6 MG/DL (ref 8–23)
BUN SERPL-MCNC: 37.9 MG/DL (ref 8–23)
BUN SERPL-MCNC: 38 MG/DL (ref 8–23)
BUN SERPL-MCNC: 38.2 MG/DL (ref 8–23)
BUN SERPL-MCNC: 38.4 MG/DL (ref 8–23)
BUN SERPL-MCNC: 38.4 MG/DL (ref 8–23)
BUN SERPL-MCNC: 39.4 MG/DL (ref 8–23)
BUN SERPL-MCNC: 40.1 MG/DL (ref 8–23)
BUN SERPL-MCNC: 40.4 MG/DL (ref 8–23)
BUN SERPL-MCNC: 40.6 MG/DL (ref 8–23)
BUN SERPL-MCNC: 40.9 MG/DL (ref 8–23)
BUN SERPL-MCNC: 41 MG/DL (ref 8–23)
BUN SERPL-MCNC: 41.3 MG/DL (ref 8–23)
BUN SERPL-MCNC: 41.5 MG/DL (ref 8–23)
BUN SERPL-MCNC: 42.1 MG/DL (ref 8–23)
BUN SERPL-MCNC: 42.4 MG/DL (ref 8–23)
BUN SERPL-MCNC: 43.2 MG/DL (ref 8–23)
BUN SERPL-MCNC: 43.4 MG/DL (ref 8–23)
BUN SERPL-MCNC: 43.4 MG/DL (ref 8–23)
BUN SERPL-MCNC: 43.6 MG/DL (ref 8–23)
BUN SERPL-MCNC: 43.9 MG/DL (ref 8–23)
BUN SERPL-MCNC: 44 MG/DL (ref 8–23)
BUN SERPL-MCNC: 44.1 MG/DL (ref 8–23)
BUN SERPL-MCNC: 44.2 MG/DL (ref 8–23)
BUN SERPL-MCNC: 44.2 MG/DL (ref 8–23)
BUN SERPL-MCNC: 44.3 MG/DL (ref 8–23)
BUN SERPL-MCNC: 44.5 MG/DL (ref 8–23)
BUN SERPL-MCNC: 45 MG/DL (ref 8–23)
BUN SERPL-MCNC: 45.3 MG/DL (ref 8–23)
BUN SERPL-MCNC: 45.3 MG/DL (ref 8–23)
BUN SERPL-MCNC: 46.2 MG/DL (ref 8–23)
BUN SERPL-MCNC: 47.3 MG/DL (ref 8–23)
BUN SERPL-MCNC: 47.8 MG/DL (ref 8–23)
BUN SERPL-MCNC: 48 MG/DL (ref 8–23)
BUN SERPL-MCNC: 48 MG/DL (ref 8–23)
BUN SERPL-MCNC: 48.7 MG/DL (ref 8–23)
BUN SERPL-MCNC: 49.2 MG/DL (ref 8–23)
BUN SERPL-MCNC: 55.8 MG/DL (ref 8–23)
BUN SERPL-MCNC: 57.3 MG/DL (ref 8–23)
BUN SERPL-MCNC: 57.9 MG/DL (ref 8–23)
BUN SERPL-MCNC: 60.3 MG/DL (ref 8–23)
BUN SERPL-MCNC: 60.3 MG/DL (ref 8–23)
BUN SERPL-MCNC: 61.3 MG/DL (ref 8–23)
BUN SERPL-MCNC: 62.2 MG/DL (ref 8–23)
BUN SERPL-MCNC: 62.3 MG/DL (ref 8–23)
BUN SERPL-MCNC: 62.4 MG/DL (ref 8–23)
BUN SERPL-MCNC: 63.4 MG/DL (ref 8–23)
BUN SERPL-MCNC: 63.5 MG/DL (ref 8–23)
BUN SERPL-MCNC: 64 MG/DL (ref 8–23)
BUN SERPL-MCNC: 64.4 MG/DL (ref 8–23)
BUN SERPL-MCNC: 64.9 MG/DL (ref 8–23)
BUN SERPL-MCNC: 65.2 MG/DL (ref 8–23)
BUN SERPL-MCNC: 65.5 MG/DL (ref 8–23)
BUN SERPL-MCNC: 66.1 MG/DL (ref 8–23)
BUN SERPL-MCNC: 66.3 MG/DL (ref 8–23)
BUN SERPL-MCNC: 67.1 MG/DL (ref 8–23)
BUN SERPL-MCNC: 67.7 MG/DL (ref 8–23)
BUN SERPL-MCNC: 68.3 MG/DL (ref 8–23)
BUN SERPL-MCNC: 68.6 MG/DL (ref 8–23)
BUN SERPL-MCNC: 69.6 MG/DL (ref 8–23)
BUN SERPL-MCNC: 71.2 MG/DL (ref 8–23)
BUN SERPL-MCNC: 71.3 MG/DL (ref 8–23)
BUN SERPL-MCNC: 73.2 MG/DL (ref 8–23)
BUN SERPL-MCNC: 73.5 MG/DL (ref 8–23)
BUN SERPL-MCNC: 74.9 MG/DL (ref 8–23)
BUN SERPL-MCNC: 75.5 MG/DL (ref 8–23)
BUN SERPL-MCNC: 75.8 MG/DL (ref 8–23)
BUN SERPL-MCNC: 75.9 MG/DL (ref 8–23)
BUN SERPL-MCNC: 77.2 MG/DL (ref 8–23)
BUN SERPL-MCNC: 77.8 MG/DL (ref 8–23)
BUN SERPL-MCNC: 78.5 MG/DL (ref 8–23)
BUN SERPL-MCNC: 79 MG/DL (ref 8–23)
BUN SERPL-MCNC: 79.3 MG/DL (ref 8–23)
BUN SERPL-MCNC: 80.9 MG/DL (ref 8–23)
BUN SERPL-MCNC: 81.4 MG/DL (ref 8–23)
BUN SERPL-MCNC: 82 MG/DL (ref 8–23)
BUN SERPL-MCNC: 82.2 MG/DL (ref 8–23)
BUN SERPL-MCNC: 83.5 MG/DL (ref 8–23)
BUN SERPL-MCNC: 83.7 MG/DL (ref 8–23)
BUN SERPL-MCNC: 84 MG/DL (ref 8–23)
BUN SERPL-MCNC: 84.1 MG/DL (ref 8–23)
BUN SERPL-MCNC: 85 MG/DL (ref 8–23)
BUN SERPL-MCNC: 85.2 MG/DL (ref 8–23)
BUN SERPL-MCNC: 85.5 MG/DL (ref 8–23)
BUN SERPL-MCNC: 85.7 MG/DL (ref 8–23)
BUN SERPL-MCNC: 86.1 MG/DL (ref 8–23)
BUN SERPL-MCNC: 86.4 MG/DL (ref 8–23)
BUN SERPL-MCNC: 86.7 MG/DL (ref 8–23)
BUN SERPL-MCNC: 86.9 MG/DL (ref 8–23)
BUN SERPL-MCNC: 87.1 MG/DL (ref 8–23)
BUN SERPL-MCNC: 87.3 MG/DL (ref 8–23)
BUN SERPL-MCNC: 88 MG/DL (ref 8–23)
BUN SERPL-MCNC: 88.1 MG/DL (ref 8–23)
BUN SERPL-MCNC: 89.2 MG/DL (ref 8–23)
BUN SERPL-MCNC: 89.4 MG/DL (ref 8–23)
BUN SERPL-MCNC: 89.6 MG/DL (ref 8–23)
BUN SERPL-MCNC: 89.7 MG/DL (ref 8–23)
BUN SERPL-MCNC: 91 MG/DL (ref 8–23)
BUN SERPL-MCNC: 91.2 MG/DL (ref 8–23)
BUN SERPL-MCNC: 91.5 MG/DL (ref 8–23)
BUN SERPL-MCNC: 91.9 MG/DL (ref 8–23)
BUN SERPL-MCNC: 92.2 MG/DL (ref 8–23)
BUN SERPL-MCNC: 93.5 MG/DL (ref 8–23)
BUN SERPL-MCNC: 93.7 MG/DL (ref 8–23)
BUN SERPL-MCNC: 94.9 MG/DL (ref 8–23)
BUN SERPL-MCNC: 95 MG/DL (ref 8–23)
BUN SERPL-MCNC: 99 MG/DL (ref 8–23)
BUN SERPL-MCNC: 99 MG/DL (ref 8–23)
C COLI+JEJUNI+LARI FUSA STL QL NAA+PROBE: NOT DETECTED
C DIFF GDH STL QL IA: POSITIVE
C DIFF TOX A+B STL QL IA: NEGATIVE
C DIFF TOX B STL QL: NEGATIVE
C DIFF TOX B STL QL: POSITIVE
CA-I BLD-MCNC: 3.9 MG/DL (ref 4.4–5.2)
CA-I BLD-MCNC: 4.2 MG/DL (ref 4.4–5.2)
CALCIUM SERPL-MCNC: 7.2 MG/DL (ref 8.8–10.2)
CALCIUM SERPL-MCNC: 7.4 MG/DL (ref 8.8–10.2)
CALCIUM SERPL-MCNC: 7.4 MG/DL (ref 8.8–10.2)
CALCIUM SERPL-MCNC: 7.5 MG/DL (ref 8.8–10.2)
CALCIUM SERPL-MCNC: 7.6 MG/DL (ref 8.8–10.2)
CALCIUM SERPL-MCNC: 7.7 MG/DL (ref 8.8–10.2)
CALCIUM SERPL-MCNC: 7.8 MG/DL (ref 8.8–10.2)
CALCIUM SERPL-MCNC: 7.9 MG/DL (ref 8.8–10.2)
CALCIUM SERPL-MCNC: 8 MG/DL (ref 8.8–10.2)
CALCIUM SERPL-MCNC: 8.1 MG/DL (ref 8.8–10.2)
CALCIUM SERPL-MCNC: 8.2 MG/DL (ref 8.8–10.2)
CALCIUM SERPL-MCNC: 8.3 MG/DL (ref 8.8–10.2)
CALCIUM SERPL-MCNC: 8.4 MG/DL (ref 8.8–10.2)
CALCIUM SERPL-MCNC: 8.5 MG/DL (ref 8.8–10.2)
CALCIUM SERPL-MCNC: 8.6 MG/DL (ref 8.8–10.2)
CALCIUM SERPL-MCNC: 8.7 MG/DL (ref 8.8–10.2)
CALCIUM SERPL-MCNC: 8.8 MG/DL (ref 8.8–10.2)
CALCIUM SERPL-MCNC: 8.8 MG/DL (ref 8.8–10.2)
CALCIUM SERPL-MCNC: 9 MG/DL (ref 8.8–10.2)
CALCIUM SERPL-MCNC: 9.1 MG/DL (ref 8.8–10.2)
CALCIUM SERPL-MCNC: 9.4 MG/DL (ref 8.8–10.2)
CALCIUM SERPL-MCNC: 9.4 MG/DL (ref 8.8–10.2)
CHLORIDE SERPL-SCNC: 100 MMOL/L (ref 98–107)
CHLORIDE SERPL-SCNC: 101 MMOL/L (ref 98–107)
CHLORIDE SERPL-SCNC: 102 MMOL/L (ref 98–107)
CHLORIDE SERPL-SCNC: 103 MMOL/L (ref 98–107)
CHLORIDE SERPL-SCNC: 104 MMOL/L (ref 98–107)
CHLORIDE SERPL-SCNC: 105 MMOL/L (ref 98–107)
CHLORIDE SERPL-SCNC: 105 MMOL/L (ref 98–107)
CHLORIDE SERPL-SCNC: 107 MMOL/L (ref 98–107)
CHLORIDE SERPL-SCNC: 108 MMOL/L (ref 98–107)
CHLORIDE SERPL-SCNC: 84 MMOL/L (ref 98–107)
CHLORIDE SERPL-SCNC: 87 MMOL/L (ref 98–107)
CHLORIDE SERPL-SCNC: 90 MMOL/L (ref 98–107)
CHLORIDE SERPL-SCNC: 92 MMOL/L (ref 98–107)
CHLORIDE SERPL-SCNC: 93 MMOL/L (ref 98–107)
CHLORIDE SERPL-SCNC: 94 MMOL/L (ref 98–107)
CHLORIDE SERPL-SCNC: 95 MMOL/L (ref 98–107)
CHLORIDE SERPL-SCNC: 96 MMOL/L (ref 98–107)
CHLORIDE SERPL-SCNC: 97 MMOL/L (ref 98–107)
CHLORIDE SERPL-SCNC: 98 MMOL/L (ref 98–107)
CHLORIDE SERPL-SCNC: 99 MMOL/L (ref 98–107)
CHOLEST SERPL-MCNC: 143 MG/DL
CHOLEST SERPL-MCNC: 157 MG/DL
CK SERPL-CCNC: 131 U/L (ref 39–308)
CK SERPL-CCNC: 148 U/L (ref 39–308)
CK SERPL-CCNC: 39 U/L (ref 39–308)
CK SERPL-CCNC: 50 U/L (ref 39–308)
CK SERPL-CCNC: 57 U/L (ref 39–308)
CK SERPL-CCNC: 62 U/L (ref 39–308)
CK SERPL-CCNC: 77 U/L (ref 39–308)
CODING SYSTEM: NORMAL
COLONOSCOPY: NORMAL
COLOR UR AUTO: ABNORMAL
COLOR UR AUTO: YELLOW
CREAT SERPL-MCNC: 1.22 MG/DL (ref 0.67–1.17)
CREAT SERPL-MCNC: 1.26 MG/DL (ref 0.67–1.17)
CREAT SERPL-MCNC: 1.27 MG/DL (ref 0.67–1.17)
CREAT SERPL-MCNC: 1.3 MG/DL (ref 0.67–1.17)
CREAT SERPL-MCNC: 1.31 MG/DL (ref 0.67–1.17)
CREAT SERPL-MCNC: 1.33 MG/DL (ref 0.67–1.17)
CREAT SERPL-MCNC: 1.35 MG/DL (ref 0.67–1.17)
CREAT SERPL-MCNC: 1.37 MG/DL (ref 0.67–1.17)
CREAT SERPL-MCNC: 1.38 MG/DL (ref 0.67–1.17)
CREAT SERPL-MCNC: 1.42 MG/DL (ref 0.67–1.17)
CREAT SERPL-MCNC: 1.43 MG/DL (ref 0.67–1.17)
CREAT SERPL-MCNC: 1.43 MG/DL (ref 0.67–1.17)
CREAT SERPL-MCNC: 1.45 MG/DL (ref 0.67–1.17)
CREAT SERPL-MCNC: 1.46 MG/DL (ref 0.67–1.17)
CREAT SERPL-MCNC: 1.47 MG/DL (ref 0.67–1.17)
CREAT SERPL-MCNC: 1.48 MG/DL (ref 0.67–1.17)
CREAT SERPL-MCNC: 1.49 MG/DL (ref 0.67–1.17)
CREAT SERPL-MCNC: 1.49 MG/DL (ref 0.67–1.17)
CREAT SERPL-MCNC: 1.53 MG/DL (ref 0.67–1.17)
CREAT SERPL-MCNC: 1.54 MG/DL (ref 0.67–1.17)
CREAT SERPL-MCNC: 1.58 MG/DL (ref 0.67–1.17)
CREAT SERPL-MCNC: 1.59 MG/DL (ref 0.67–1.17)
CREAT SERPL-MCNC: 1.59 MG/DL (ref 0.67–1.17)
CREAT SERPL-MCNC: 1.67 MG/DL (ref 0.67–1.17)
CREAT SERPL-MCNC: 1.68 MG/DL (ref 0.67–1.17)
CREAT SERPL-MCNC: 1.69 MG/DL (ref 0.67–1.17)
CREAT SERPL-MCNC: 1.7 MG/DL (ref 0.67–1.17)
CREAT SERPL-MCNC: 1.71 MG/DL (ref 0.67–1.17)
CREAT SERPL-MCNC: 1.72 MG/DL (ref 0.67–1.17)
CREAT SERPL-MCNC: 1.72 MG/DL (ref 0.67–1.17)
CREAT SERPL-MCNC: 1.73 MG/DL (ref 0.67–1.17)
CREAT SERPL-MCNC: 1.74 MG/DL (ref 0.67–1.17)
CREAT SERPL-MCNC: 1.76 MG/DL (ref 0.67–1.17)
CREAT SERPL-MCNC: 1.77 MG/DL (ref 0.67–1.17)
CREAT SERPL-MCNC: 1.78 MG/DL (ref 0.67–1.17)
CREAT SERPL-MCNC: 1.79 MG/DL (ref 0.67–1.17)
CREAT SERPL-MCNC: 1.83 MG/DL (ref 0.67–1.17)
CREAT SERPL-MCNC: 1.85 MG/DL (ref 0.67–1.17)
CREAT SERPL-MCNC: 1.86 MG/DL (ref 0.67–1.17)
CREAT SERPL-MCNC: 1.87 MG/DL (ref 0.67–1.17)
CREAT SERPL-MCNC: 1.88 MG/DL (ref 0.67–1.17)
CREAT SERPL-MCNC: 1.89 MG/DL (ref 0.67–1.17)
CREAT SERPL-MCNC: 1.9 MG/DL (ref 0.67–1.17)
CREAT SERPL-MCNC: 1.92 MG/DL (ref 0.67–1.17)
CREAT SERPL-MCNC: 1.92 MG/DL (ref 0.67–1.17)
CREAT SERPL-MCNC: 1.94 MG/DL (ref 0.67–1.17)
CREAT SERPL-MCNC: 1.96 MG/DL (ref 0.67–1.17)
CREAT SERPL-MCNC: 1.97 MG/DL (ref 0.67–1.17)
CREAT SERPL-MCNC: 1.99 MG/DL (ref 0.67–1.17)
CREAT SERPL-MCNC: 2 MG/DL (ref 0.67–1.17)
CREAT SERPL-MCNC: 2.03 MG/DL (ref 0.67–1.17)
CREAT SERPL-MCNC: 2.04 MG/DL (ref 0.67–1.17)
CREAT SERPL-MCNC: 2.06 MG/DL (ref 0.67–1.17)
CREAT SERPL-MCNC: 2.11 MG/DL (ref 0.67–1.17)
CREAT SERPL-MCNC: 2.13 MG/DL (ref 0.67–1.17)
CREAT SERPL-MCNC: 2.15 MG/DL (ref 0.67–1.17)
CREAT SERPL-MCNC: 2.17 MG/DL (ref 0.67–1.17)
CREAT SERPL-MCNC: 2.19 MG/DL (ref 0.67–1.17)
CREAT SERPL-MCNC: 2.24 MG/DL (ref 0.67–1.17)
CREAT SERPL-MCNC: 2.24 MG/DL (ref 0.67–1.17)
CREAT SERPL-MCNC: 2.3 MG/DL (ref 0.67–1.17)
CREAT SERPL-MCNC: 2.31 MG/DL (ref 0.67–1.17)
CREAT SERPL-MCNC: 2.34 MG/DL (ref 0.67–1.17)
CREAT SERPL-MCNC: 2.36 MG/DL (ref 0.67–1.17)
CREAT SERPL-MCNC: 2.38 MG/DL (ref 0.67–1.17)
CREAT SERPL-MCNC: 2.42 MG/DL (ref 0.67–1.17)
CREAT SERPL-MCNC: 2.44 MG/DL (ref 0.67–1.17)
CREAT SERPL-MCNC: 2.59 MG/DL (ref 0.67–1.17)
CREAT SERPL-MCNC: 2.59 MG/DL (ref 0.67–1.17)
CREAT SERPL-MCNC: 2.63 MG/DL (ref 0.67–1.17)
CREAT SERPL-MCNC: 2.66 MG/DL (ref 0.67–1.17)
CREAT SERPL-MCNC: 2.69 MG/DL (ref 0.67–1.17)
CREAT SERPL-MCNC: 2.71 MG/DL (ref 0.67–1.17)
CREAT SERPL-MCNC: 2.76 MG/DL (ref 0.67–1.17)
CREAT SERPL-MCNC: 2.78 MG/DL (ref 0.67–1.17)
CREAT SERPL-MCNC: 2.78 MG/DL (ref 0.67–1.17)
CREAT SERPL-MCNC: 2.81 MG/DL (ref 0.67–1.17)
CREAT SERPL-MCNC: 2.82 MG/DL (ref 0.67–1.17)
CREAT SERPL-MCNC: 2.85 MG/DL (ref 0.67–1.17)
CREAT SERPL-MCNC: 2.88 MG/DL (ref 0.67–1.17)
CREAT SERPL-MCNC: 2.92 MG/DL (ref 0.67–1.17)
CREAT SERPL-MCNC: 3.02 MG/DL (ref 0.67–1.17)
CREAT SERPL-MCNC: 3.03 MG/DL (ref 0.67–1.17)
CREAT SERPL-MCNC: 3.07 MG/DL (ref 0.67–1.17)
CREAT SERPL-MCNC: 3.07 MG/DL (ref 0.67–1.17)
CREAT SERPL-MCNC: 3.09 MG/DL (ref 0.67–1.17)
CREAT SERPL-MCNC: 3.1 MG/DL (ref 0.67–1.17)
CREAT SERPL-MCNC: 3.13 MG/DL (ref 0.67–1.17)
CREAT SERPL-MCNC: 3.21 MG/DL (ref 0.67–1.17)
CREAT SERPL-MCNC: 3.22 MG/DL (ref 0.67–1.17)
CREAT SERPL-MCNC: 3.24 MG/DL (ref 0.67–1.17)
CREAT SERPL-MCNC: 3.27 MG/DL (ref 0.67–1.17)
CREAT SERPL-MCNC: 3.3 MG/DL (ref 0.67–1.17)
CREAT SERPL-MCNC: 3.31 MG/DL (ref 0.67–1.17)
CREAT SERPL-MCNC: 3.33 MG/DL (ref 0.67–1.17)
CREAT SERPL-MCNC: 3.34 MG/DL (ref 0.67–1.17)
CREAT SERPL-MCNC: 3.38 MG/DL (ref 0.67–1.17)
CREAT SERPL-MCNC: 3.42 MG/DL (ref 0.67–1.17)
CREAT SERPL-MCNC: 3.42 MG/DL (ref 0.67–1.17)
CREAT SERPL-MCNC: 3.43 MG/DL (ref 0.67–1.17)
CREAT SERPL-MCNC: 3.43 MG/DL (ref 0.67–1.17)
CREAT SERPL-MCNC: 3.46 MG/DL (ref 0.67–1.17)
CREAT SERPL-MCNC: 3.46 MG/DL (ref 0.67–1.17)
CREAT SERPL-MCNC: 3.47 MG/DL (ref 0.67–1.17)
CREAT SERPL-MCNC: 3.48 MG/DL (ref 0.67–1.17)
CREAT SERPL-MCNC: 3.48 MG/DL (ref 0.67–1.17)
CREAT SERPL-MCNC: 3.49 MG/DL (ref 0.67–1.17)
CREAT SERPL-MCNC: 3.53 MG/DL (ref 0.67–1.17)
CREAT SERPL-MCNC: 3.56 MG/DL (ref 0.67–1.17)
CREAT SERPL-MCNC: 3.57 MG/DL (ref 0.67–1.17)
CREAT SERPL-MCNC: 3.57 MG/DL (ref 0.67–1.17)
CREAT SERPL-MCNC: 3.72 MG/DL (ref 0.67–1.17)
CREAT SERPL-MCNC: 3.78 MG/DL (ref 0.67–1.17)
CREAT SERPL-MCNC: 3.8 MG/DL (ref 0.67–1.17)
CROSSMATCH: NORMAL
CRP SERPL-MCNC: 10.2 MG/L
CRP SERPL-MCNC: 14.6 MG/L
CRP SERPL-MCNC: 27.6 MG/L
CRP SERPL-MCNC: 51.7 MG/L
CRP SERPL-MCNC: 8.68 MG/L
CRP SERPL-MCNC: 9.67 MG/L
CYSTATIN C (ROCHE): 3 MG/L (ref 0.6–1)
DEPRECATED HCO3 PLAS-SCNC: 14 MMOL/L (ref 22–29)
DEPRECATED HCO3 PLAS-SCNC: 16 MMOL/L (ref 22–29)
DEPRECATED HCO3 PLAS-SCNC: 17 MMOL/L (ref 22–29)
DEPRECATED HCO3 PLAS-SCNC: 18 MMOL/L (ref 22–29)
DEPRECATED HCO3 PLAS-SCNC: 19 MMOL/L (ref 22–29)
DEPRECATED HCO3 PLAS-SCNC: 20 MMOL/L (ref 22–29)
DEPRECATED HCO3 PLAS-SCNC: 21 MMOL/L (ref 22–29)
DEPRECATED HCO3 PLAS-SCNC: 22 MMOL/L (ref 22–29)
DEPRECATED HCO3 PLAS-SCNC: 23 MMOL/L (ref 22–29)
DEPRECATED HCO3 PLAS-SCNC: 24 MMOL/L (ref 22–29)
DEPRECATED HCO3 PLAS-SCNC: 25 MMOL/L (ref 22–29)
DEPRECATED HCO3 PLAS-SCNC: 26 MMOL/L (ref 22–29)
DEPRECATED HCO3 PLAS-SCNC: 27 MMOL/L (ref 22–29)
DEPRECATED HCO3 PLAS-SCNC: 28 MMOL/L (ref 22–29)
DEPRECATED HCO3 PLAS-SCNC: 29 MMOL/L (ref 22–29)
DEPRECATED HCO3 PLAS-SCNC: 30 MMOL/L (ref 22–29)
DEPRECATED HCO3 PLAS-SCNC: 31 MMOL/L (ref 22–29)
DEPRECATED HCO3 PLAS-SCNC: 33 MMOL/L (ref 22–29)
DIASTOLIC BLOOD PRESSURE - MUSE: NORMAL MMHG
EC STX1 GENE STL QL NAA+PROBE: NOT DETECTED
EC STX2 GENE STL QL NAA+PROBE: NOT DETECTED
EOSINOPHIL # BLD AUTO: 0.1 10E3/UL (ref 0–0.7)
EOSINOPHIL # BLD AUTO: 0.2 10E3/UL (ref 0–0.7)
EOSINOPHIL # BLD AUTO: 0.3 10E3/UL (ref 0–0.7)
EOSINOPHIL # BLD AUTO: 0.4 10E3/UL (ref 0–0.7)
EOSINOPHIL NFR BLD AUTO: 1 %
EOSINOPHIL NFR BLD AUTO: 2 %
EOSINOPHIL NFR BLD AUTO: 3 %
EOSINOPHIL NFR BLD AUTO: 4 %
EOSINOPHIL NFR BLD AUTO: 5 %
ERYTHROCYTE [DISTWIDTH] IN BLOOD BY AUTOMATED COUNT: 17.8 % (ref 10–15)
ERYTHROCYTE [DISTWIDTH] IN BLOOD BY AUTOMATED COUNT: 17.9 % (ref 10–15)
ERYTHROCYTE [DISTWIDTH] IN BLOOD BY AUTOMATED COUNT: 18 % (ref 10–15)
ERYTHROCYTE [DISTWIDTH] IN BLOOD BY AUTOMATED COUNT: 18 % (ref 10–15)
ERYTHROCYTE [DISTWIDTH] IN BLOOD BY AUTOMATED COUNT: 18.1 % (ref 10–15)
ERYTHROCYTE [DISTWIDTH] IN BLOOD BY AUTOMATED COUNT: 18.2 % (ref 10–15)
ERYTHROCYTE [DISTWIDTH] IN BLOOD BY AUTOMATED COUNT: 18.3 % (ref 10–15)
ERYTHROCYTE [DISTWIDTH] IN BLOOD BY AUTOMATED COUNT: 18.3 % (ref 10–15)
ERYTHROCYTE [DISTWIDTH] IN BLOOD BY AUTOMATED COUNT: 18.4 % (ref 10–15)
ERYTHROCYTE [DISTWIDTH] IN BLOOD BY AUTOMATED COUNT: 18.4 % (ref 10–15)
ERYTHROCYTE [DISTWIDTH] IN BLOOD BY AUTOMATED COUNT: 18.5 % (ref 10–15)
ERYTHROCYTE [DISTWIDTH] IN BLOOD BY AUTOMATED COUNT: 18.6 % (ref 10–15)
ERYTHROCYTE [DISTWIDTH] IN BLOOD BY AUTOMATED COUNT: 18.6 % (ref 10–15)
ERYTHROCYTE [DISTWIDTH] IN BLOOD BY AUTOMATED COUNT: 18.7 % (ref 10–15)
ERYTHROCYTE [DISTWIDTH] IN BLOOD BY AUTOMATED COUNT: 18.8 % (ref 10–15)
ERYTHROCYTE [DISTWIDTH] IN BLOOD BY AUTOMATED COUNT: 18.9 % (ref 10–15)
ERYTHROCYTE [DISTWIDTH] IN BLOOD BY AUTOMATED COUNT: 19.1 % (ref 10–15)
ERYTHROCYTE [DISTWIDTH] IN BLOOD BY AUTOMATED COUNT: 19.2 % (ref 10–15)
ERYTHROCYTE [DISTWIDTH] IN BLOOD BY AUTOMATED COUNT: 19.3 % (ref 10–15)
ERYTHROCYTE [DISTWIDTH] IN BLOOD BY AUTOMATED COUNT: 19.4 % (ref 10–15)
ERYTHROCYTE [DISTWIDTH] IN BLOOD BY AUTOMATED COUNT: 19.5 % (ref 10–15)
ERYTHROCYTE [DISTWIDTH] IN BLOOD BY AUTOMATED COUNT: 19.6 % (ref 10–15)
ERYTHROCYTE [DISTWIDTH] IN BLOOD BY AUTOMATED COUNT: 19.6 % (ref 10–15)
ERYTHROCYTE [DISTWIDTH] IN BLOOD BY AUTOMATED COUNT: 19.7 % (ref 10–15)
ERYTHROCYTE [DISTWIDTH] IN BLOOD BY AUTOMATED COUNT: 19.7 % (ref 10–15)
ERYTHROCYTE [DISTWIDTH] IN BLOOD BY AUTOMATED COUNT: 19.8 % (ref 10–15)
ERYTHROCYTE [DISTWIDTH] IN BLOOD BY AUTOMATED COUNT: 19.9 % (ref 10–15)
ERYTHROCYTE [DISTWIDTH] IN BLOOD BY AUTOMATED COUNT: 21.1 % (ref 10–15)
ERYTHROCYTE [DISTWIDTH] IN BLOOD BY AUTOMATED COUNT: 21.1 % (ref 10–15)
ERYTHROCYTE [DISTWIDTH] IN BLOOD BY AUTOMATED COUNT: 21.5 % (ref 10–15)
ERYTHROCYTE [DISTWIDTH] IN BLOOD BY AUTOMATED COUNT: 21.6 % (ref 10–15)
ERYTHROCYTE [DISTWIDTH] IN BLOOD BY AUTOMATED COUNT: 21.7 % (ref 10–15)
ERYTHROCYTE [DISTWIDTH] IN BLOOD BY AUTOMATED COUNT: 21.8 % (ref 10–15)
ERYTHROCYTE [DISTWIDTH] IN BLOOD BY AUTOMATED COUNT: 21.9 % (ref 10–15)
ERYTHROCYTE [DISTWIDTH] IN BLOOD BY AUTOMATED COUNT: 22.2 % (ref 10–15)
ERYTHROCYTE [DISTWIDTH] IN BLOOD BY AUTOMATED COUNT: 22.3 % (ref 10–15)
ERYTHROCYTE [DISTWIDTH] IN BLOOD BY AUTOMATED COUNT: 22.3 % (ref 10–15)
ERYTHROCYTE [DISTWIDTH] IN BLOOD BY AUTOMATED COUNT: 22.4 % (ref 10–15)
ERYTHROCYTE [DISTWIDTH] IN BLOOD BY AUTOMATED COUNT: 22.5 % (ref 10–15)
ERYTHROCYTE [DISTWIDTH] IN BLOOD BY AUTOMATED COUNT: 22.5 % (ref 10–15)
ERYTHROCYTE [DISTWIDTH] IN BLOOD BY AUTOMATED COUNT: 22.8 % (ref 10–15)
ERYTHROCYTE [DISTWIDTH] IN BLOOD BY AUTOMATED COUNT: 22.8 % (ref 10–15)
ERYTHROCYTE [DISTWIDTH] IN BLOOD BY AUTOMATED COUNT: 23.3 % (ref 10–15)
ERYTHROCYTE [DISTWIDTH] IN BLOOD BY AUTOMATED COUNT: 23.3 % (ref 10–15)
ERYTHROCYTE [DISTWIDTH] IN BLOOD BY AUTOMATED COUNT: 23.5 % (ref 10–15)
ERYTHROCYTE [DISTWIDTH] IN BLOOD BY AUTOMATED COUNT: 23.8 % (ref 10–15)
ERYTHROCYTE [DISTWIDTH] IN BLOOD BY AUTOMATED COUNT: 23.8 % (ref 10–15)
ERYTHROCYTE [DISTWIDTH] IN BLOOD BY AUTOMATED COUNT: 23.9 % (ref 10–15)
ERYTHROCYTE [DISTWIDTH] IN BLOOD BY AUTOMATED COUNT: 24 % (ref 10–15)
FACT X ACT/NOR PPP CHRO: 38 % (ref 70–130)
FACT X ACT/NOR PPP CHRO: 42 % (ref 70–130)
FERRITIN SERPL-MCNC: 167 NG/ML (ref 31–409)
FERRITIN SERPL-MCNC: 222 NG/ML (ref 31–409)
FIBRINOGEN PPP-MCNC: 389 MG/DL (ref 170–490)
FOLATE SERPL-MCNC: >40 NG/ML (ref 4.6–34.8)
GFR SERPL CREATININE-BSD FRML MDRD: 16 ML/MIN/1.73M2
GFR SERPL CREATININE-BSD FRML MDRD: 16 ML/MIN/1.73M2
GFR SERPL CREATININE-BSD FRML MDRD: 17 ML/MIN/1.73M2
GFR SERPL CREATININE-BSD FRML MDRD: 17 ML/MIN/1.73M2
GFR SERPL CREATININE-BSD FRML MDRD: 18 ML/MIN/1.73M2
GFR SERPL CREATININE-BSD FRML MDRD: 19 ML/MIN/1.73M2
GFR SERPL CREATININE-BSD FRML MDRD: 20 ML/MIN/1.73M2
GFR SERPL CREATININE-BSD FRML MDRD: 21 ML/MIN/1.73M2
GFR SERPL CREATININE-BSD FRML MDRD: 22 ML/MIN/1.73M2
GFR SERPL CREATININE-BSD FRML MDRD: 22 ML/MIN/1.73M2
GFR SERPL CREATININE-BSD FRML MDRD: 23 ML/MIN/1.73M2
GFR SERPL CREATININE-BSD FRML MDRD: 24 ML/MIN/1.73M2
GFR SERPL CREATININE-BSD FRML MDRD: 25 ML/MIN/1.73M2
GFR SERPL CREATININE-BSD FRML MDRD: 26 ML/MIN/1.73M2
GFR SERPL CREATININE-BSD FRML MDRD: 26 ML/MIN/1.73M2
GFR SERPL CREATININE-BSD FRML MDRD: 28 ML/MIN/1.73M2
GFR SERPL CREATININE-BSD FRML MDRD: 28 ML/MIN/1.73M2
GFR SERPL CREATININE-BSD FRML MDRD: 29 ML/MIN/1.73M2
GFR SERPL CREATININE-BSD FRML MDRD: 30 ML/MIN/1.73M2
GFR SERPL CREATININE-BSD FRML MDRD: 30 ML/MIN/1.73M2
GFR SERPL CREATININE-BSD FRML MDRD: 31 ML/MIN/1.73M2
GFR SERPL CREATININE-BSD FRML MDRD: 31 ML/MIN/1.73M2
GFR SERPL CREATININE-BSD FRML MDRD: 32 ML/MIN/1.73M2
GFR SERPL CREATININE-BSD FRML MDRD: 32 ML/MIN/1.73M2
GFR SERPL CREATININE-BSD FRML MDRD: 33 ML/MIN/1.73M2
GFR SERPL CREATININE-BSD FRML MDRD: 34 ML/MIN/1.73M2
GFR SERPL CREATININE-BSD FRML MDRD: 34 ML/MIN/1.73M2
GFR SERPL CREATININE-BSD FRML MDRD: 35 ML/MIN/1.73M2
GFR SERPL CREATININE-BSD FRML MDRD: 35 ML/MIN/1.73M2
GFR SERPL CREATININE-BSD FRML MDRD: 36 ML/MIN/1.73M2
GFR SERPL CREATININE-BSD FRML MDRD: 37 ML/MIN/1.73M2
GFR SERPL CREATININE-BSD FRML MDRD: 38 ML/MIN/1.73M2
GFR SERPL CREATININE-BSD FRML MDRD: 39 ML/MIN/1.73M2
GFR SERPL CREATININE-BSD FRML MDRD: 41 ML/MIN/1.73M2
GFR SERPL CREATININE-BSD FRML MDRD: 42 ML/MIN/1.73M2
GFR SERPL CREATININE-BSD FRML MDRD: 43 ML/MIN/1.73M2
GFR SERPL CREATININE-BSD FRML MDRD: 44 ML/MIN/1.73M2
GFR SERPL CREATININE-BSD FRML MDRD: 44 ML/MIN/1.73M2
GFR SERPL CREATININE-BSD FRML MDRD: 47 ML/MIN/1.73M2
GFR SERPL CREATININE-BSD FRML MDRD: 49 ML/MIN/1.73M2
GFR SERPL CREATININE-BSD FRML MDRD: 49 ML/MIN/1.73M2
GFR SERPL CREATININE-BSD FRML MDRD: 50 ML/MIN/1.73M2
GFR SERPL CREATININE-BSD FRML MDRD: 50 ML/MIN/1.73M2
GFR SERPL CREATININE-BSD FRML MDRD: 51 ML/MIN/1.73M2
GFR SERPL CREATININE-BSD FRML MDRD: 52 ML/MIN/1.73M2
GFR SERPL CREATININE-BSD FRML MDRD: 53 ML/MIN/1.73M2
GFR SERPL CREATININE-BSD FRML MDRD: 55 ML/MIN/1.73M2
GFR SERPL CREATININE-BSD FRML MDRD: 56 ML/MIN/1.73M2
GFR SERPL CREATININE-BSD FRML MDRD: 57 ML/MIN/1.73M2
GFR SERPL CREATININE-BSD FRML MDRD: 58 ML/MIN/1.73M2
GFR SERPL CREATININE-BSD FRML MDRD: 59 ML/MIN/1.73M2
GFR SERPL CREATININE-BSD FRML MDRD: 59 ML/MIN/1.73M2
GFR SERPL CREATININE-BSD FRML MDRD: 61 ML/MIN/1.73M2
GFR SERPL CREATININE-BSD FRML MDRD: 62 ML/MIN/1.73M2
GFR SERPL CREATININE-BSD FRML MDRD: 64 ML/MIN/1.73M2
GGT SERPL-CCNC: 1075 U/L (ref 8–61)
GLUCOSE BLDC GLUCOMTR-MCNC: 104 MG/DL (ref 70–99)
GLUCOSE BLDC GLUCOMTR-MCNC: 105 MG/DL (ref 70–99)
GLUCOSE BLDC GLUCOMTR-MCNC: 106 MG/DL (ref 70–99)
GLUCOSE BLDC GLUCOMTR-MCNC: 106 MG/DL (ref 70–99)
GLUCOSE BLDC GLUCOMTR-MCNC: 107 MG/DL (ref 70–99)
GLUCOSE BLDC GLUCOMTR-MCNC: 107 MG/DL (ref 70–99)
GLUCOSE BLDC GLUCOMTR-MCNC: 108 MG/DL (ref 70–99)
GLUCOSE BLDC GLUCOMTR-MCNC: 110 MG/DL (ref 70–99)
GLUCOSE BLDC GLUCOMTR-MCNC: 113 MG/DL (ref 70–99)
GLUCOSE BLDC GLUCOMTR-MCNC: 114 MG/DL (ref 70–99)
GLUCOSE BLDC GLUCOMTR-MCNC: 114 MG/DL (ref 70–99)
GLUCOSE BLDC GLUCOMTR-MCNC: 115 MG/DL (ref 70–99)
GLUCOSE BLDC GLUCOMTR-MCNC: 117 MG/DL (ref 70–99)
GLUCOSE BLDC GLUCOMTR-MCNC: 118 MG/DL (ref 70–99)
GLUCOSE BLDC GLUCOMTR-MCNC: 118 MG/DL (ref 70–99)
GLUCOSE BLDC GLUCOMTR-MCNC: 119 MG/DL (ref 70–99)
GLUCOSE BLDC GLUCOMTR-MCNC: 119 MG/DL (ref 70–99)
GLUCOSE BLDC GLUCOMTR-MCNC: 120 MG/DL (ref 70–99)
GLUCOSE BLDC GLUCOMTR-MCNC: 121 MG/DL (ref 70–99)
GLUCOSE BLDC GLUCOMTR-MCNC: 122 MG/DL (ref 70–99)
GLUCOSE BLDC GLUCOMTR-MCNC: 122 MG/DL (ref 70–99)
GLUCOSE BLDC GLUCOMTR-MCNC: 123 MG/DL (ref 70–99)
GLUCOSE BLDC GLUCOMTR-MCNC: 123 MG/DL (ref 70–99)
GLUCOSE BLDC GLUCOMTR-MCNC: 124 MG/DL (ref 70–99)
GLUCOSE BLDC GLUCOMTR-MCNC: 125 MG/DL (ref 70–99)
GLUCOSE BLDC GLUCOMTR-MCNC: 125 MG/DL (ref 70–99)
GLUCOSE BLDC GLUCOMTR-MCNC: 126 MG/DL (ref 70–99)
GLUCOSE BLDC GLUCOMTR-MCNC: 126 MG/DL (ref 70–99)
GLUCOSE BLDC GLUCOMTR-MCNC: 127 MG/DL (ref 70–99)
GLUCOSE BLDC GLUCOMTR-MCNC: 127 MG/DL (ref 70–99)
GLUCOSE BLDC GLUCOMTR-MCNC: 128 MG/DL (ref 70–99)
GLUCOSE BLDC GLUCOMTR-MCNC: 128 MG/DL (ref 70–99)
GLUCOSE BLDC GLUCOMTR-MCNC: 129 MG/DL (ref 70–99)
GLUCOSE BLDC GLUCOMTR-MCNC: 130 MG/DL (ref 70–99)
GLUCOSE BLDC GLUCOMTR-MCNC: 131 MG/DL (ref 70–99)
GLUCOSE BLDC GLUCOMTR-MCNC: 132 MG/DL (ref 70–99)
GLUCOSE BLDC GLUCOMTR-MCNC: 133 MG/DL (ref 70–99)
GLUCOSE BLDC GLUCOMTR-MCNC: 133 MG/DL (ref 70–99)
GLUCOSE BLDC GLUCOMTR-MCNC: 134 MG/DL (ref 70–99)
GLUCOSE BLDC GLUCOMTR-MCNC: 135 MG/DL (ref 70–99)
GLUCOSE BLDC GLUCOMTR-MCNC: 136 MG/DL (ref 70–99)
GLUCOSE BLDC GLUCOMTR-MCNC: 137 MG/DL (ref 70–99)
GLUCOSE BLDC GLUCOMTR-MCNC: 138 MG/DL (ref 70–99)
GLUCOSE BLDC GLUCOMTR-MCNC: 139 MG/DL (ref 70–99)
GLUCOSE BLDC GLUCOMTR-MCNC: 140 MG/DL (ref 70–99)
GLUCOSE BLDC GLUCOMTR-MCNC: 141 MG/DL (ref 70–99)
GLUCOSE BLDC GLUCOMTR-MCNC: 142 MG/DL (ref 70–99)
GLUCOSE BLDC GLUCOMTR-MCNC: 143 MG/DL (ref 70–99)
GLUCOSE BLDC GLUCOMTR-MCNC: 145 MG/DL (ref 70–99)
GLUCOSE BLDC GLUCOMTR-MCNC: 146 MG/DL (ref 70–99)
GLUCOSE BLDC GLUCOMTR-MCNC: 147 MG/DL (ref 70–99)
GLUCOSE BLDC GLUCOMTR-MCNC: 148 MG/DL (ref 70–99)
GLUCOSE BLDC GLUCOMTR-MCNC: 149 MG/DL (ref 70–99)
GLUCOSE BLDC GLUCOMTR-MCNC: 151 MG/DL (ref 70–99)
GLUCOSE BLDC GLUCOMTR-MCNC: 151 MG/DL (ref 70–99)
GLUCOSE BLDC GLUCOMTR-MCNC: 152 MG/DL (ref 70–99)
GLUCOSE BLDC GLUCOMTR-MCNC: 152 MG/DL (ref 70–99)
GLUCOSE BLDC GLUCOMTR-MCNC: 153 MG/DL (ref 70–99)
GLUCOSE BLDC GLUCOMTR-MCNC: 154 MG/DL (ref 70–99)
GLUCOSE BLDC GLUCOMTR-MCNC: 154 MG/DL (ref 70–99)
GLUCOSE BLDC GLUCOMTR-MCNC: 155 MG/DL (ref 70–99)
GLUCOSE BLDC GLUCOMTR-MCNC: 157 MG/DL (ref 70–99)
GLUCOSE BLDC GLUCOMTR-MCNC: 158 MG/DL (ref 70–99)
GLUCOSE BLDC GLUCOMTR-MCNC: 160 MG/DL (ref 70–99)
GLUCOSE BLDC GLUCOMTR-MCNC: 161 MG/DL (ref 70–99)
GLUCOSE BLDC GLUCOMTR-MCNC: 163 MG/DL (ref 70–99)
GLUCOSE BLDC GLUCOMTR-MCNC: 163 MG/DL (ref 70–99)
GLUCOSE BLDC GLUCOMTR-MCNC: 164 MG/DL (ref 70–99)
GLUCOSE BLDC GLUCOMTR-MCNC: 166 MG/DL (ref 70–99)
GLUCOSE BLDC GLUCOMTR-MCNC: 167 MG/DL (ref 70–99)
GLUCOSE BLDC GLUCOMTR-MCNC: 169 MG/DL (ref 70–99)
GLUCOSE BLDC GLUCOMTR-MCNC: 169 MG/DL (ref 70–99)
GLUCOSE BLDC GLUCOMTR-MCNC: 170 MG/DL (ref 70–99)
GLUCOSE BLDC GLUCOMTR-MCNC: 170 MG/DL (ref 70–99)
GLUCOSE BLDC GLUCOMTR-MCNC: 173 MG/DL (ref 70–99)
GLUCOSE BLDC GLUCOMTR-MCNC: 173 MG/DL (ref 70–99)
GLUCOSE BLDC GLUCOMTR-MCNC: 174 MG/DL (ref 70–99)
GLUCOSE BLDC GLUCOMTR-MCNC: 176 MG/DL (ref 70–99)
GLUCOSE BLDC GLUCOMTR-MCNC: 177 MG/DL (ref 70–99)
GLUCOSE BLDC GLUCOMTR-MCNC: 178 MG/DL (ref 70–99)
GLUCOSE BLDC GLUCOMTR-MCNC: 179 MG/DL (ref 70–99)
GLUCOSE BLDC GLUCOMTR-MCNC: 180 MG/DL (ref 70–99)
GLUCOSE BLDC GLUCOMTR-MCNC: 181 MG/DL (ref 70–99)
GLUCOSE BLDC GLUCOMTR-MCNC: 184 MG/DL (ref 70–99)
GLUCOSE BLDC GLUCOMTR-MCNC: 186 MG/DL (ref 70–99)
GLUCOSE BLDC GLUCOMTR-MCNC: 187 MG/DL (ref 70–99)
GLUCOSE BLDC GLUCOMTR-MCNC: 188 MG/DL (ref 70–99)
GLUCOSE BLDC GLUCOMTR-MCNC: 190 MG/DL (ref 70–99)
GLUCOSE BLDC GLUCOMTR-MCNC: 190 MG/DL (ref 70–99)
GLUCOSE BLDC GLUCOMTR-MCNC: 193 MG/DL (ref 70–99)
GLUCOSE BLDC GLUCOMTR-MCNC: 198 MG/DL (ref 70–99)
GLUCOSE BLDC GLUCOMTR-MCNC: 199 MG/DL (ref 70–99)
GLUCOSE BLDC GLUCOMTR-MCNC: 199 MG/DL (ref 70–99)
GLUCOSE BLDC GLUCOMTR-MCNC: 202 MG/DL (ref 70–99)
GLUCOSE BLDC GLUCOMTR-MCNC: 204 MG/DL (ref 70–99)
GLUCOSE BLDC GLUCOMTR-MCNC: 211 MG/DL (ref 70–99)
GLUCOSE BLDC GLUCOMTR-MCNC: 213 MG/DL (ref 70–99)
GLUCOSE BLDC GLUCOMTR-MCNC: 213 MG/DL (ref 70–99)
GLUCOSE BLDC GLUCOMTR-MCNC: 218 MG/DL (ref 70–99)
GLUCOSE BLDC GLUCOMTR-MCNC: 220 MG/DL (ref 70–99)
GLUCOSE BLDC GLUCOMTR-MCNC: 232 MG/DL (ref 70–99)
GLUCOSE BLDC GLUCOMTR-MCNC: 238 MG/DL (ref 70–99)
GLUCOSE BLDC GLUCOMTR-MCNC: 241 MG/DL (ref 70–99)
GLUCOSE BLDC GLUCOMTR-MCNC: 259 MG/DL (ref 70–99)
GLUCOSE BLDC GLUCOMTR-MCNC: 76 MG/DL (ref 70–99)
GLUCOSE BLDC GLUCOMTR-MCNC: 84 MG/DL (ref 70–99)
GLUCOSE BLDC GLUCOMTR-MCNC: 88 MG/DL (ref 70–99)
GLUCOSE BLDC GLUCOMTR-MCNC: 93 MG/DL (ref 70–99)
GLUCOSE BLDC GLUCOMTR-MCNC: 96 MG/DL (ref 70–99)
GLUCOSE BLDC GLUCOMTR-MCNC: 96 MG/DL (ref 70–99)
GLUCOSE BLDC GLUCOMTR-MCNC: 99 MG/DL (ref 70–99)
GLUCOSE SERPL-MCNC: 103 MG/DL (ref 70–99)
GLUCOSE SERPL-MCNC: 104 MG/DL (ref 70–99)
GLUCOSE SERPL-MCNC: 104 MG/DL (ref 70–99)
GLUCOSE SERPL-MCNC: 106 MG/DL (ref 70–99)
GLUCOSE SERPL-MCNC: 107 MG/DL (ref 70–99)
GLUCOSE SERPL-MCNC: 110 MG/DL (ref 70–99)
GLUCOSE SERPL-MCNC: 110 MG/DL (ref 70–99)
GLUCOSE SERPL-MCNC: 111 MG/DL (ref 70–99)
GLUCOSE SERPL-MCNC: 112 MG/DL (ref 70–99)
GLUCOSE SERPL-MCNC: 113 MG/DL (ref 70–99)
GLUCOSE SERPL-MCNC: 115 MG/DL (ref 70–99)
GLUCOSE SERPL-MCNC: 115 MG/DL (ref 70–99)
GLUCOSE SERPL-MCNC: 116 MG/DL (ref 70–99)
GLUCOSE SERPL-MCNC: 117 MG/DL (ref 70–99)
GLUCOSE SERPL-MCNC: 119 MG/DL (ref 70–99)
GLUCOSE SERPL-MCNC: 120 MG/DL (ref 70–99)
GLUCOSE SERPL-MCNC: 121 MG/DL (ref 70–99)
GLUCOSE SERPL-MCNC: 123 MG/DL (ref 70–99)
GLUCOSE SERPL-MCNC: 123 MG/DL (ref 70–99)
GLUCOSE SERPL-MCNC: 125 MG/DL (ref 70–99)
GLUCOSE SERPL-MCNC: 125 MG/DL (ref 70–99)
GLUCOSE SERPL-MCNC: 126 MG/DL (ref 70–99)
GLUCOSE SERPL-MCNC: 127 MG/DL (ref 70–99)
GLUCOSE SERPL-MCNC: 128 MG/DL (ref 70–99)
GLUCOSE SERPL-MCNC: 129 MG/DL (ref 70–99)
GLUCOSE SERPL-MCNC: 129 MG/DL (ref 70–99)
GLUCOSE SERPL-MCNC: 130 MG/DL (ref 70–99)
GLUCOSE SERPL-MCNC: 132 MG/DL (ref 70–99)
GLUCOSE SERPL-MCNC: 133 MG/DL (ref 70–99)
GLUCOSE SERPL-MCNC: 137 MG/DL (ref 70–99)
GLUCOSE SERPL-MCNC: 138 MG/DL (ref 70–99)
GLUCOSE SERPL-MCNC: 139 MG/DL (ref 70–99)
GLUCOSE SERPL-MCNC: 140 MG/DL (ref 70–99)
GLUCOSE SERPL-MCNC: 140 MG/DL (ref 70–99)
GLUCOSE SERPL-MCNC: 141 MG/DL (ref 70–99)
GLUCOSE SERPL-MCNC: 142 MG/DL (ref 70–99)
GLUCOSE SERPL-MCNC: 143 MG/DL (ref 70–99)
GLUCOSE SERPL-MCNC: 143 MG/DL (ref 70–99)
GLUCOSE SERPL-MCNC: 144 MG/DL (ref 70–99)
GLUCOSE SERPL-MCNC: 144 MG/DL (ref 70–99)
GLUCOSE SERPL-MCNC: 145 MG/DL (ref 70–99)
GLUCOSE SERPL-MCNC: 146 MG/DL (ref 70–99)
GLUCOSE SERPL-MCNC: 149 MG/DL (ref 70–99)
GLUCOSE SERPL-MCNC: 150 MG/DL (ref 70–99)
GLUCOSE SERPL-MCNC: 152 MG/DL (ref 70–99)
GLUCOSE SERPL-MCNC: 153 MG/DL (ref 70–99)
GLUCOSE SERPL-MCNC: 154 MG/DL (ref 70–99)
GLUCOSE SERPL-MCNC: 155 MG/DL (ref 70–99)
GLUCOSE SERPL-MCNC: 155 MG/DL (ref 70–99)
GLUCOSE SERPL-MCNC: 156 MG/DL (ref 70–99)
GLUCOSE SERPL-MCNC: 157 MG/DL (ref 70–99)
GLUCOSE SERPL-MCNC: 157 MG/DL (ref 70–99)
GLUCOSE SERPL-MCNC: 161 MG/DL (ref 70–99)
GLUCOSE SERPL-MCNC: 162 MG/DL (ref 70–99)
GLUCOSE SERPL-MCNC: 162 MG/DL (ref 70–99)
GLUCOSE SERPL-MCNC: 166 MG/DL (ref 70–99)
GLUCOSE SERPL-MCNC: 167 MG/DL (ref 70–99)
GLUCOSE SERPL-MCNC: 167 MG/DL (ref 70–99)
GLUCOSE SERPL-MCNC: 170 MG/DL (ref 70–99)
GLUCOSE SERPL-MCNC: 170 MG/DL (ref 70–99)
GLUCOSE SERPL-MCNC: 171 MG/DL (ref 70–99)
GLUCOSE SERPL-MCNC: 174 MG/DL (ref 70–99)
GLUCOSE SERPL-MCNC: 175 MG/DL (ref 70–99)
GLUCOSE SERPL-MCNC: 176 MG/DL (ref 70–99)
GLUCOSE SERPL-MCNC: 176 MG/DL (ref 70–99)
GLUCOSE SERPL-MCNC: 178 MG/DL (ref 70–99)
GLUCOSE SERPL-MCNC: 181 MG/DL (ref 70–99)
GLUCOSE SERPL-MCNC: 182 MG/DL (ref 70–99)
GLUCOSE SERPL-MCNC: 191 MG/DL (ref 70–99)
GLUCOSE SERPL-MCNC: 193 MG/DL (ref 70–99)
GLUCOSE SERPL-MCNC: 196 MG/DL (ref 70–99)
GLUCOSE SERPL-MCNC: 198 MG/DL (ref 70–99)
GLUCOSE SERPL-MCNC: 199 MG/DL (ref 70–99)
GLUCOSE SERPL-MCNC: 203 MG/DL (ref 70–99)
GLUCOSE SERPL-MCNC: 203 MG/DL (ref 70–99)
GLUCOSE SERPL-MCNC: 204 MG/DL (ref 70–99)
GLUCOSE SERPL-MCNC: 209 MG/DL (ref 70–99)
GLUCOSE SERPL-MCNC: 222 MG/DL (ref 70–99)
GLUCOSE SERPL-MCNC: 248 MG/DL (ref 70–99)
GLUCOSE SERPL-MCNC: 248 MG/DL (ref 70–99)
GLUCOSE SERPL-MCNC: 259 MG/DL (ref 70–99)
GLUCOSE SERPL-MCNC: 263 MG/DL (ref 70–99)
GLUCOSE SERPL-MCNC: 280 MG/DL (ref 70–99)
GLUCOSE SERPL-MCNC: 283 MG/DL (ref 70–99)
GLUCOSE SERPL-MCNC: 90 MG/DL (ref 70–99)
GLUCOSE SERPL-MCNC: 91 MG/DL (ref 70–99)
GLUCOSE SERPL-MCNC: 92 MG/DL (ref 70–99)
GLUCOSE SERPL-MCNC: 94 MG/DL (ref 70–99)
GLUCOSE SERPL-MCNC: 96 MG/DL (ref 70–99)
GLUCOSE SERPL-MCNC: 98 MG/DL (ref 70–99)
GLUCOSE SERPL-MCNC: 98 MG/DL (ref 70–99)
GLUCOSE UR STRIP-MCNC: 50 MG/DL
GLUCOSE UR STRIP-MCNC: 50 MG/DL
GLUCOSE UR STRIP-MCNC: NEGATIVE MG/DL
GLUCOSE UR STRIP-MCNC: NEGATIVE MG/DL
GRAM STAIN RESULT: ABNORMAL
GRAM STAIN RESULT: ABNORMAL
HAPTOGLOB SERPL-MCNC: 39 MG/DL (ref 32–197)
HAV IGG SER QL IA: NONREACTIVE
HBA1C MFR BLD: 6.3 %
HBV CORE AB SERPL QL IA: NONREACTIVE
HBV SURFACE AG SERPL QL IA: NONREACTIVE
HCO3 BLDV-SCNC: 20 MMOL/L (ref 21–28)
HCO3 BLDV-SCNC: 32 MMOL/L (ref 21–28)
HCT VFR BLD AUTO: 21.5 % (ref 40–53)
HCT VFR BLD AUTO: 22.5 % (ref 40–53)
HCT VFR BLD AUTO: 24.7 % (ref 40–53)
HCT VFR BLD AUTO: 24.8 % (ref 40–53)
HCT VFR BLD AUTO: 24.9 % (ref 40–53)
HCT VFR BLD AUTO: 24.9 % (ref 40–53)
HCT VFR BLD AUTO: 25.1 % (ref 40–53)
HCT VFR BLD AUTO: 25.3 % (ref 40–53)
HCT VFR BLD AUTO: 25.4 % (ref 40–53)
HCT VFR BLD AUTO: 25.6 % (ref 40–53)
HCT VFR BLD AUTO: 25.7 % (ref 40–53)
HCT VFR BLD AUTO: 26 % (ref 40–53)
HCT VFR BLD AUTO: 27.2 % (ref 40–53)
HCT VFR BLD AUTO: 27.2 % (ref 40–53)
HCT VFR BLD AUTO: 27.4 % (ref 40–53)
HCT VFR BLD AUTO: 27.6 % (ref 40–53)
HCT VFR BLD AUTO: 27.6 % (ref 40–53)
HCT VFR BLD AUTO: 27.7 % (ref 40–53)
HCT VFR BLD AUTO: 27.8 % (ref 40–53)
HCT VFR BLD AUTO: 27.9 % (ref 40–53)
HCT VFR BLD AUTO: 28 % (ref 40–53)
HCT VFR BLD AUTO: 28 % (ref 40–53)
HCT VFR BLD AUTO: 28.2 % (ref 40–53)
HCT VFR BLD AUTO: 28.2 % (ref 40–53)
HCT VFR BLD AUTO: 28.3 % (ref 40–53)
HCT VFR BLD AUTO: 28.3 % (ref 40–53)
HCT VFR BLD AUTO: 28.4 % (ref 40–53)
HCT VFR BLD AUTO: 28.4 % (ref 40–53)
HCT VFR BLD AUTO: 28.6 % (ref 40–53)
HCT VFR BLD AUTO: 28.9 % (ref 40–53)
HCT VFR BLD AUTO: 29 % (ref 40–53)
HCT VFR BLD AUTO: 29.1 % (ref 40–53)
HCT VFR BLD AUTO: 29.1 % (ref 40–53)
HCT VFR BLD AUTO: 29.6 % (ref 40–53)
HCT VFR BLD AUTO: 29.7 % (ref 40–53)
HCT VFR BLD AUTO: 29.7 % (ref 40–53)
HCT VFR BLD AUTO: 29.8 % (ref 40–53)
HCT VFR BLD AUTO: 29.9 % (ref 40–53)
HCT VFR BLD AUTO: 30.2 % (ref 40–53)
HCT VFR BLD AUTO: 30.5 % (ref 40–53)
HCT VFR BLD AUTO: 30.6 % (ref 40–53)
HCT VFR BLD AUTO: 30.6 % (ref 40–53)
HCT VFR BLD AUTO: 30.7 % (ref 40–53)
HCT VFR BLD AUTO: 31.1 % (ref 40–53)
HCT VFR BLD AUTO: 31.2 % (ref 40–53)
HCT VFR BLD AUTO: 31.6 % (ref 40–53)
HCT VFR BLD AUTO: 31.9 % (ref 40–53)
HCT VFR BLD AUTO: 32.8 % (ref 40–53)
HCT VFR BLD AUTO: 32.9 % (ref 40–53)
HCT VFR BLD AUTO: 33.1 % (ref 40–53)
HCT VFR BLD AUTO: 33.4 % (ref 40–53)
HCT VFR BLD AUTO: 33.5 % (ref 40–53)
HCT VFR BLD AUTO: 34.3 % (ref 40–53)
HCT VFR BLD AUTO: 34.6 % (ref 40–53)
HCT VFR BLD AUTO: 34.7 % (ref 40–53)
HCT VFR BLD AUTO: 34.9 % (ref 40–53)
HCT VFR BLD AUTO: 35.2 % (ref 40–53)
HCT VFR BLD AUTO: 35.3 % (ref 40–53)
HCT VFR BLD AUTO: 36.2 % (ref 40–53)
HCT VFR BLD AUTO: 36.4 % (ref 40–53)
HCT VFR BLD AUTO: 36.5 % (ref 40–53)
HCT VFR BLD AUTO: 36.5 % (ref 40–53)
HCT VFR BLD AUTO: 37.7 % (ref 40–53)
HCT VFR BLD AUTO: 37.8 % (ref 40–53)
HCT VFR BLD AUTO: 37.9 % (ref 40–53)
HCT VFR BLD AUTO: 38.6 % (ref 40–53)
HCT VFR BLD AUTO: 39.6 % (ref 40–53)
HCT VFR BLD AUTO: 40.2 % (ref 40–53)
HCT VFR BLD AUTO: 40.9 % (ref 40–53)
HCT VFR BLD AUTO: 40.9 % (ref 40–53)
HCT VFR BLD AUTO: 41.2 % (ref 40–53)
HCT VFR BLD AUTO: 41.9 % (ref 40–53)
HCV AB SERPL QL IA: NONREACTIVE
HCV RNA SERPL NAA+PROBE-ACNC: NOT DETECTED IU/ML
HDLC SERPL-MCNC: 64 MG/DL
HDLC SERPL-MCNC: 67 MG/DL
HGB BLD-MCNC: 10 G/DL (ref 13.3–17.7)
HGB BLD-MCNC: 10.1 G/DL (ref 13.3–17.7)
HGB BLD-MCNC: 10.1 G/DL (ref 13.3–17.7)
HGB BLD-MCNC: 10.2 G/DL (ref 13.3–17.7)
HGB BLD-MCNC: 10.3 G/DL (ref 13.3–17.7)
HGB BLD-MCNC: 10.4 G/DL (ref 13.3–17.7)
HGB BLD-MCNC: 10.5 G/DL (ref 13.3–17.7)
HGB BLD-MCNC: 10.6 G/DL (ref 13.3–17.7)
HGB BLD-MCNC: 10.7 G/DL (ref 13.3–17.7)
HGB BLD-MCNC: 10.8 G/DL (ref 13.3–17.7)
HGB BLD-MCNC: 10.9 G/DL (ref 13.3–17.7)
HGB BLD-MCNC: 11 G/DL (ref 13.3–17.7)
HGB BLD-MCNC: 11.1 G/DL (ref 13.3–17.7)
HGB BLD-MCNC: 11.3 G/DL (ref 13.3–17.7)
HGB BLD-MCNC: 11.4 G/DL (ref 13.3–17.7)
HGB BLD-MCNC: 11.7 G/DL (ref 13.3–17.7)
HGB BLD-MCNC: 11.8 G/DL (ref 13.3–17.7)
HGB BLD-MCNC: 12.1 G/DL (ref 13.3–17.7)
HGB BLD-MCNC: 12.1 G/DL (ref 13.3–17.7)
HGB BLD-MCNC: 12.2 G/DL (ref 13.3–17.7)
HGB BLD-MCNC: 12.3 G/DL (ref 13.3–17.7)
HGB BLD-MCNC: 12.3 G/DL (ref 13.3–17.7)
HGB BLD-MCNC: 12.7 G/DL (ref 13.3–17.7)
HGB BLD-MCNC: 13.1 G/DL (ref 13.3–17.7)
HGB BLD-MCNC: 6.5 G/DL (ref 13.3–17.7)
HGB BLD-MCNC: 6.7 G/DL (ref 13.3–17.7)
HGB BLD-MCNC: 7.3 G/DL (ref 13.3–17.7)
HGB BLD-MCNC: 7.7 G/DL (ref 13.3–17.7)
HGB BLD-MCNC: 7.8 G/DL (ref 13.3–17.7)
HGB BLD-MCNC: 7.9 G/DL (ref 13.3–17.7)
HGB BLD-MCNC: 8.1 G/DL (ref 13.3–17.7)
HGB BLD-MCNC: 8.1 G/DL (ref 13.3–17.7)
HGB BLD-MCNC: 8.2 G/DL (ref 13.3–17.7)
HGB BLD-MCNC: 8.3 G/DL (ref 13.3–17.7)
HGB BLD-MCNC: 8.4 G/DL (ref 13.3–17.7)
HGB BLD-MCNC: 8.5 G/DL (ref 13.3–17.7)
HGB BLD-MCNC: 8.6 G/DL (ref 13.3–17.7)
HGB BLD-MCNC: 8.7 G/DL (ref 13.3–17.7)
HGB BLD-MCNC: 8.7 G/DL (ref 13.3–17.7)
HGB BLD-MCNC: 8.8 G/DL (ref 13.3–17.7)
HGB BLD-MCNC: 8.8 G/DL (ref 13.3–17.7)
HGB BLD-MCNC: 8.9 G/DL (ref 13.3–17.7)
HGB BLD-MCNC: 9 G/DL (ref 13.3–17.7)
HGB BLD-MCNC: 9 G/DL (ref 13.3–17.7)
HGB BLD-MCNC: 9.1 G/DL (ref 13.3–17.7)
HGB BLD-MCNC: 9.5 G/DL (ref 13.3–17.7)
HGB BLD-MCNC: 9.5 G/DL (ref 13.3–17.7)
HGB BLD-MCNC: 9.6 G/DL (ref 13.3–17.7)
HGB BLD-MCNC: 9.7 G/DL (ref 13.3–17.7)
HGB BLD-MCNC: 9.8 G/DL (ref 13.3–17.7)
HGB BLD-MCNC: 9.8 G/DL (ref 13.3–17.7)
HGB BLD-MCNC: 9.9 G/DL (ref 13.3–17.7)
HGB UR QL STRIP: ABNORMAL
HGB UR QL STRIP: NEGATIVE
HIV 1+2 AB+HIV1 P24 AG SERPL QL IA: NONREACTIVE
HOLD SPECIMEN: NORMAL
HYALINE CASTS: 1 /LPF
IMM GRANULOCYTES # BLD: 0.1 10E3/UL
IMM GRANULOCYTES # BLD: 0.2 10E3/UL
IMM GRANULOCYTES # BLD: 0.3 10E3/UL
IMM GRANULOCYTES NFR BLD: 1 %
IMM GRANULOCYTES NFR BLD: 2 %
IMM GRANULOCYTES NFR BLD: 3 %
IMM GRANULOCYTES NFR BLD: 3 %
INR (EXTERNAL): 1.4 (ref 0.9–1.1)
INR (EXTERNAL): 1.5 (ref 0.9–1.1)
INR (EXTERNAL): 1.8 (ref 0.9–1.1)
INR (EXTERNAL): 1.9 (ref 0.9–1.1)
INR (EXTERNAL): 1.9 (ref 0.9–1.1)
INR (EXTERNAL): 2 (ref 0.9–1.1)
INR (EXTERNAL): 2.1 (ref 0.9–1.1)
INR (EXTERNAL): 2.3 (ref 0.9–1.1)
INR (EXTERNAL): 2.7 (ref 0.9–1.1)
INR (EXTERNAL): 3 (ref 0.9–1.1)
INR BLD: 1.6 (ref 2–3)
INR HOME MONITORING: 1.8 (ref 1.8–2.3)
INR PPP: 1.32 (ref 0.85–1.15)
INR PPP: 1.32 (ref 0.85–1.15)
INR PPP: 1.36 (ref 0.85–1.15)
INR PPP: 1.37 (ref 0.85–1.15)
INR PPP: 1.38 (ref 0.85–1.15)
INR PPP: 1.44 (ref 0.85–1.15)
INR PPP: 1.47 (ref 0.85–1.15)
INR PPP: 1.49 (ref 0.85–1.15)
INR PPP: 1.5 (ref 0.85–1.15)
INR PPP: 1.51 (ref 0.85–1.15)
INR PPP: 1.56 (ref 0.85–1.15)
INR PPP: 1.58 (ref 0.85–1.15)
INR PPP: 1.64 (ref 0.85–1.15)
INR PPP: 1.75 (ref 0.85–1.15)
INR PPP: 1.79 (ref 0.85–1.15)
INR PPP: 1.81 (ref 0.85–1.15)
INR PPP: 1.83 (ref 0.85–1.15)
INR PPP: 1.87 (ref 0.85–1.15)
INR PPP: 1.88 (ref 0.85–1.15)
INR PPP: 1.89 (ref 0.85–1.15)
INR PPP: 1.9 (ref 0.85–1.15)
INR PPP: 1.92 (ref 0.85–1.15)
INR PPP: 1.97 (ref 0.85–1.15)
INR PPP: 1.98 (ref 0.85–1.15)
INR PPP: 1.98 (ref 0.85–1.15)
INR PPP: 2.01 (ref 0.85–1.15)
INR PPP: 2.03 (ref 0.85–1.15)
INR PPP: 2.03 (ref 0.85–1.15)
INR PPP: 2.06 (ref 0.85–1.15)
INR PPP: 2.07 (ref 0.85–1.15)
INR PPP: 2.09 (ref 0.85–1.15)
INR PPP: 2.09 (ref 0.85–1.15)
INR PPP: 2.12 (ref 0.85–1.15)
INR PPP: 2.15 (ref 0.85–1.15)
INR PPP: 2.17 (ref 0.85–1.15)
INR PPP: 2.19 (ref 0.85–1.15)
INR PPP: 2.2 (ref 0.85–1.15)
INR PPP: 2.22 (ref 0.85–1.15)
INR PPP: 2.24 (ref 0.85–1.15)
INR PPP: 2.26 (ref 0.85–1.15)
INR PPP: 2.27 (ref 0.85–1.15)
INR PPP: 2.29 (ref 0.85–1.15)
INR PPP: 2.35 (ref 0.85–1.15)
INR PPP: 2.36 (ref 0.85–1.15)
INR PPP: 2.37 (ref 0.85–1.15)
INR PPP: 2.38 (ref 0.85–1.15)
INR PPP: 2.39 (ref 0.85–1.15)
INR PPP: 2.4 (ref 0.85–1.15)
INR PPP: 2.4 (ref 0.85–1.15)
INR PPP: 2.41 (ref 0.85–1.15)
INR PPP: 2.42 (ref 0.85–1.15)
INR PPP: 2.43 (ref 0.85–1.15)
INR PPP: 2.44 (ref 0.85–1.15)
INR PPP: 2.44 (ref 0.85–1.15)
INR PPP: 2.45 (ref 0.85–1.15)
INR PPP: 2.46 (ref 0.85–1.15)
INR PPP: 2.46 (ref 0.85–1.15)
INR PPP: 2.47 (ref 0.85–1.15)
INR PPP: 2.47 (ref 0.85–1.15)
INR PPP: 2.48 (ref 0.85–1.15)
INR PPP: 2.49 (ref 0.85–1.15)
INR PPP: 2.5 (ref 0.85–1.15)
INR PPP: 2.53 (ref 0.85–1.15)
INR PPP: 2.53 (ref 0.85–1.15)
INR PPP: 2.55 (ref 0.85–1.15)
INR PPP: 2.62 (ref 0.85–1.15)
INR PPP: 2.89 (ref 0.85–1.15)
INR PPP: 2.92 (ref 0.85–1.15)
INR PPP: 4.39 (ref 0.85–1.15)
INR PPP: 9.65 (ref 0.85–1.15)
INR PPP: >10 (ref 0.85–1.15)
INTERPRETATION ECG - MUSE: NORMAL
IRON BINDING CAPACITY (ROCHE): 254 UG/DL (ref 240–430)
IRON BINDING CAPACITY (ROCHE): 260 UG/DL (ref 240–430)
IRON SATN MFR SERPL: 10 % (ref 15–46)
IRON SATN MFR SERPL: 11 % (ref 15–46)
IRON SERPL-MCNC: 26 UG/DL (ref 61–157)
IRON SERPL-MCNC: 28 UG/DL (ref 61–157)
ISSUE DATE AND TIME: NORMAL
KETONES UR STRIP-MCNC: NEGATIVE MG/DL
LACTATE SERPL-SCNC: 0.9 MMOL/L (ref 0.7–2)
LACTATE SERPL-SCNC: 1.1 MMOL/L (ref 0.7–2)
LACTATE SERPL-SCNC: 1.4 MMOL/L (ref 0.7–2)
LACTATE SERPL-SCNC: 1.4 MMOL/L (ref 0.7–2)
LACTATE SERPL-SCNC: 2.1 MMOL/L (ref 0.7–2)
LDH SERPL L TO P-CCNC: 269 U/L (ref 0–250)
LDH SERPL L TO P-CCNC: 281 U/L (ref 0–250)
LDH SERPL L TO P-CCNC: 283 U/L (ref 0–250)
LDH SERPL L TO P-CCNC: 285 U/L (ref 0–250)
LDH SERPL L TO P-CCNC: 288 U/L (ref 0–250)
LDH SERPL L TO P-CCNC: 289 U/L (ref 0–250)
LDH SERPL L TO P-CCNC: 296 U/L (ref 0–250)
LDH SERPL L TO P-CCNC: 297 U/L (ref 0–250)
LDH SERPL L TO P-CCNC: 300 U/L (ref 0–250)
LDH SERPL L TO P-CCNC: 301 U/L (ref 0–250)
LDH SERPL L TO P-CCNC: 302 U/L (ref 0–250)
LDH SERPL L TO P-CCNC: 305 U/L (ref 0–250)
LDH SERPL L TO P-CCNC: 306 U/L (ref 0–250)
LDH SERPL L TO P-CCNC: 308 U/L (ref 0–250)
LDH SERPL L TO P-CCNC: 315 U/L (ref 0–250)
LDH SERPL L TO P-CCNC: 316 U/L (ref 0–250)
LDH SERPL L TO P-CCNC: 320 U/L (ref 0–250)
LDH SERPL L TO P-CCNC: 320 U/L (ref 0–250)
LDH SERPL L TO P-CCNC: 321 U/L (ref 0–250)
LDH SERPL L TO P-CCNC: 322 U/L (ref 0–250)
LDH SERPL L TO P-CCNC: 323 U/L (ref 0–250)
LDH SERPL L TO P-CCNC: 323 U/L (ref 0–250)
LDH SERPL L TO P-CCNC: 327 U/L (ref 0–250)
LDH SERPL L TO P-CCNC: 328 U/L (ref 0–250)
LDH SERPL L TO P-CCNC: 332 U/L (ref 0–250)
LDH SERPL L TO P-CCNC: 333 U/L (ref 0–250)
LDH SERPL L TO P-CCNC: 333 U/L (ref 0–250)
LDH SERPL L TO P-CCNC: 343 U/L (ref 0–250)
LDH SERPL L TO P-CCNC: 345 U/L (ref 0–250)
LDH SERPL L TO P-CCNC: 348 U/L (ref 0–250)
LDH SERPL L TO P-CCNC: 358 U/L (ref 0–250)
LDH SERPL L TO P-CCNC: 359 U/L (ref 0–250)
LDH SERPL L TO P-CCNC: 362 U/L (ref 0–250)
LDH SERPL L TO P-CCNC: 363 U/L (ref 0–250)
LDH SERPL L TO P-CCNC: 366 U/L (ref 0–250)
LDH SERPL L TO P-CCNC: 378 U/L (ref 0–250)
LDH SERPL L TO P-CCNC: 386 U/L (ref 0–250)
LDH SERPL L TO P-CCNC: 389 U/L (ref 0–250)
LDH SERPL L TO P-CCNC: 389 U/L (ref 0–250)
LDH SERPL L TO P-CCNC: 393 U/L (ref 0–250)
LDH SERPL L TO P-CCNC: 401 U/L (ref 0–250)
LDH SERPL L TO P-CCNC: 405 U/L (ref 0–250)
LDH SERPL L TO P-CCNC: 418 U/L (ref 0–250)
LDH SERPL L TO P-CCNC: 435 U/L (ref 0–250)
LDH SERPL L TO P-CCNC: 582 U/L (ref 0–250)
LDLC SERPL CALC-MCNC: 66 MG/DL
LDLC SERPL CALC-MCNC: 79 MG/DL
LEUKOCYTE ESTERASE UR QL STRIP: NEGATIVE
LVEF ECHO: NORMAL
LYMPHOCYTES # BLD AUTO: 0.5 10E3/UL (ref 0.8–5.3)
LYMPHOCYTES # BLD AUTO: 0.6 10E3/UL (ref 0.8–5.3)
LYMPHOCYTES # BLD AUTO: 0.7 10E3/UL (ref 0.8–5.3)
LYMPHOCYTES # BLD AUTO: 0.8 10E3/UL (ref 0.8–5.3)
LYMPHOCYTES # BLD AUTO: 0.9 10E3/UL (ref 0.8–5.3)
LYMPHOCYTES # BLD AUTO: 1 10E3/UL (ref 0.8–5.3)
LYMPHOCYTES # BLD AUTO: 1 10E3/UL (ref 0.8–5.3)
LYMPHOCYTES # BLD AUTO: 1.1 10E3/UL (ref 0.8–5.3)
LYMPHOCYTES NFR BLD AUTO: 10 %
LYMPHOCYTES NFR BLD AUTO: 11 %
LYMPHOCYTES NFR BLD AUTO: 12 %
LYMPHOCYTES NFR BLD AUTO: 4 %
LYMPHOCYTES NFR BLD AUTO: 5 %
LYMPHOCYTES NFR BLD AUTO: 6 %
LYMPHOCYTES NFR BLD AUTO: 7 %
LYMPHOCYTES NFR BLD AUTO: 8 %
LYMPHOCYTES NFR BLD AUTO: 9 %
Lab: NORMAL
MAGNESIUM SERPL-MCNC: 1.4 MG/DL (ref 1.7–2.3)
MAGNESIUM SERPL-MCNC: 1.5 MG/DL (ref 1.7–2.3)
MAGNESIUM SERPL-MCNC: 1.6 MG/DL (ref 1.7–2.3)
MAGNESIUM SERPL-MCNC: 1.7 MG/DL (ref 1.7–2.3)
MAGNESIUM SERPL-MCNC: 1.8 MG/DL (ref 1.7–2.3)
MAGNESIUM SERPL-MCNC: 1.9 MG/DL (ref 1.7–2.3)
MAGNESIUM SERPL-MCNC: 2 MG/DL (ref 1.7–2.3)
MAGNESIUM SERPL-MCNC: 2.1 MG/DL (ref 1.7–2.3)
MAGNESIUM SERPL-MCNC: 2.2 MG/DL (ref 1.7–2.3)
MAGNESIUM SERPL-MCNC: 2.3 MG/DL (ref 1.7–2.3)
MAGNESIUM SERPL-MCNC: 2.4 MG/DL (ref 1.7–2.3)
MAGNESIUM SERPL-MCNC: 2.5 MG/DL (ref 1.7–2.3)
MAGNESIUM SERPL-MCNC: 2.6 MG/DL (ref 1.7–2.3)
MAGNESIUM SERPL-MCNC: 2.7 MG/DL (ref 1.7–2.3)
MAGNESIUM SERPL-MCNC: 2.8 MG/DL (ref 1.7–2.3)
MCH RBC QN AUTO: 25.8 PG (ref 26.5–33)
MCH RBC QN AUTO: 26 PG (ref 26.5–33)
MCH RBC QN AUTO: 26 PG (ref 26.5–33)
MCH RBC QN AUTO: 26.3 PG (ref 26.5–33)
MCH RBC QN AUTO: 26.5 PG (ref 26.5–33)
MCH RBC QN AUTO: 26.5 PG (ref 26.5–33)
MCH RBC QN AUTO: 26.7 PG (ref 26.5–33)
MCH RBC QN AUTO: 26.8 PG (ref 26.5–33)
MCH RBC QN AUTO: 26.9 PG (ref 26.5–33)
MCH RBC QN AUTO: 27 PG (ref 26.5–33)
MCH RBC QN AUTO: 27 PG (ref 26.5–33)
MCH RBC QN AUTO: 27.1 PG (ref 26.5–33)
MCH RBC QN AUTO: 27.2 PG (ref 26.5–33)
MCH RBC QN AUTO: 27.2 PG (ref 26.5–33)
MCH RBC QN AUTO: 27.3 PG (ref 26.5–33)
MCH RBC QN AUTO: 27.4 PG (ref 26.5–33)
MCH RBC QN AUTO: 27.5 PG (ref 26.5–33)
MCH RBC QN AUTO: 27.5 PG (ref 26.5–33)
MCH RBC QN AUTO: 27.6 PG (ref 26.5–33)
MCH RBC QN AUTO: 27.7 PG (ref 26.5–33)
MCH RBC QN AUTO: 27.8 PG (ref 26.5–33)
MCH RBC QN AUTO: 28.1 PG (ref 26.5–33)
MCH RBC QN AUTO: 28.3 PG (ref 26.5–33)
MCH RBC QN AUTO: 28.3 PG (ref 26.5–33)
MCH RBC QN AUTO: 28.4 PG (ref 26.5–33)
MCH RBC QN AUTO: 28.4 PG (ref 26.5–33)
MCH RBC QN AUTO: 28.5 PG (ref 26.5–33)
MCH RBC QN AUTO: 28.6 PG (ref 26.5–33)
MCH RBC QN AUTO: 28.7 PG (ref 26.5–33)
MCH RBC QN AUTO: 28.9 PG (ref 26.5–33)
MCH RBC QN AUTO: 29 PG (ref 26.5–33)
MCH RBC QN AUTO: 29 PG (ref 26.5–33)
MCH RBC QN AUTO: 29.2 PG (ref 26.5–33)
MCH RBC QN AUTO: 29.3 PG (ref 26.5–33)
MCH RBC QN AUTO: 29.3 PG (ref 26.5–33)
MCH RBC QN AUTO: 29.4 PG (ref 26.5–33)
MCH RBC QN AUTO: 29.4 PG (ref 26.5–33)
MCH RBC QN AUTO: 29.5 PG (ref 26.5–33)
MCH RBC QN AUTO: 29.6 PG (ref 26.5–33)
MCH RBC QN AUTO: 29.6 PG (ref 26.5–33)
MCH RBC QN AUTO: 29.7 PG (ref 26.5–33)
MCH RBC QN AUTO: 29.8 PG (ref 26.5–33)
MCH RBC QN AUTO: 29.8 PG (ref 26.5–33)
MCH RBC QN AUTO: 30 PG (ref 26.5–33)
MCH RBC QN AUTO: 30.1 PG (ref 26.5–33)
MCH RBC QN AUTO: 30.2 PG (ref 26.5–33)
MCHC RBC AUTO-ENTMCNC: 27.7 G/DL (ref 31.5–36.5)
MCHC RBC AUTO-ENTMCNC: 28.7 G/DL (ref 31.5–36.5)
MCHC RBC AUTO-ENTMCNC: 28.9 G/DL (ref 31.5–36.5)
MCHC RBC AUTO-ENTMCNC: 29 G/DL (ref 31.5–36.5)
MCHC RBC AUTO-ENTMCNC: 29.2 G/DL (ref 31.5–36.5)
MCHC RBC AUTO-ENTMCNC: 29.3 G/DL (ref 31.5–36.5)
MCHC RBC AUTO-ENTMCNC: 29.4 G/DL (ref 31.5–36.5)
MCHC RBC AUTO-ENTMCNC: 29.4 G/DL (ref 31.5–36.5)
MCHC RBC AUTO-ENTMCNC: 29.5 G/DL (ref 31.5–36.5)
MCHC RBC AUTO-ENTMCNC: 29.5 G/DL (ref 31.5–36.5)
MCHC RBC AUTO-ENTMCNC: 29.6 G/DL (ref 31.5–36.5)
MCHC RBC AUTO-ENTMCNC: 29.7 G/DL (ref 31.5–36.5)
MCHC RBC AUTO-ENTMCNC: 29.8 G/DL (ref 31.5–36.5)
MCHC RBC AUTO-ENTMCNC: 29.8 G/DL (ref 31.5–36.5)
MCHC RBC AUTO-ENTMCNC: 29.9 G/DL (ref 31.5–36.5)
MCHC RBC AUTO-ENTMCNC: 30 G/DL (ref 31.5–36.5)
MCHC RBC AUTO-ENTMCNC: 30 G/DL (ref 31.5–36.5)
MCHC RBC AUTO-ENTMCNC: 30.1 G/DL (ref 31.5–36.5)
MCHC RBC AUTO-ENTMCNC: 30.2 G/DL (ref 31.5–36.5)
MCHC RBC AUTO-ENTMCNC: 30.3 G/DL (ref 31.5–36.5)
MCHC RBC AUTO-ENTMCNC: 30.3 G/DL (ref 31.5–36.5)
MCHC RBC AUTO-ENTMCNC: 30.4 G/DL (ref 31.5–36.5)
MCHC RBC AUTO-ENTMCNC: 30.5 G/DL (ref 31.5–36.5)
MCHC RBC AUTO-ENTMCNC: 30.6 G/DL (ref 31.5–36.5)
MCHC RBC AUTO-ENTMCNC: 30.7 G/DL (ref 31.5–36.5)
MCHC RBC AUTO-ENTMCNC: 30.8 G/DL (ref 31.5–36.5)
MCHC RBC AUTO-ENTMCNC: 30.8 G/DL (ref 31.5–36.5)
MCHC RBC AUTO-ENTMCNC: 30.9 G/DL (ref 31.5–36.5)
MCHC RBC AUTO-ENTMCNC: 31 G/DL (ref 31.5–36.5)
MCHC RBC AUTO-ENTMCNC: 31 G/DL (ref 31.5–36.5)
MCHC RBC AUTO-ENTMCNC: 31.1 G/DL (ref 31.5–36.5)
MCHC RBC AUTO-ENTMCNC: 31.2 G/DL (ref 31.5–36.5)
MCHC RBC AUTO-ENTMCNC: 31.2 G/DL (ref 31.5–36.5)
MCHC RBC AUTO-ENTMCNC: 31.3 G/DL (ref 31.5–36.5)
MCHC RBC AUTO-ENTMCNC: 31.4 G/DL (ref 31.5–36.5)
MCHC RBC AUTO-ENTMCNC: 31.6 G/DL (ref 31.5–36.5)
MCHC RBC AUTO-ENTMCNC: 31.7 G/DL (ref 31.5–36.5)
MCHC RBC AUTO-ENTMCNC: 31.7 G/DL (ref 31.5–36.5)
MCHC RBC AUTO-ENTMCNC: 31.8 G/DL (ref 31.5–36.5)
MCHC RBC AUTO-ENTMCNC: 31.8 G/DL (ref 31.5–36.5)
MCHC RBC AUTO-ENTMCNC: 31.9 G/DL (ref 31.5–36.5)
MCHC RBC AUTO-ENTMCNC: 32.1 G/DL (ref 31.5–36.5)
MCHC RBC AUTO-ENTMCNC: 32.1 G/DL (ref 31.5–36.5)
MCHC RBC AUTO-ENTMCNC: 32.3 G/DL (ref 31.5–36.5)
MCHC RBC AUTO-ENTMCNC: 32.5 G/DL (ref 31.5–36.5)
MCHC RBC AUTO-ENTMCNC: 33.4 G/DL (ref 31.5–36.5)
MCV RBC AUTO: 100 FL (ref 78–100)
MCV RBC AUTO: 104 FL (ref 78–100)
MCV RBC AUTO: 81 FL (ref 78–100)
MCV RBC AUTO: 86 FL (ref 78–100)
MCV RBC AUTO: 87 FL (ref 78–100)
MCV RBC AUTO: 88 FL (ref 78–100)
MCV RBC AUTO: 89 FL (ref 78–100)
MCV RBC AUTO: 90 FL (ref 78–100)
MCV RBC AUTO: 91 FL (ref 78–100)
MCV RBC AUTO: 92 FL (ref 78–100)
MCV RBC AUTO: 93 FL (ref 78–100)
MCV RBC AUTO: 94 FL (ref 78–100)
MCV RBC AUTO: 95 FL (ref 78–100)
MCV RBC AUTO: 95 FL (ref 78–100)
MCV RBC AUTO: 96 FL (ref 78–100)
MCV RBC AUTO: 97 FL (ref 78–100)
MCV RBC AUTO: 98 FL (ref 78–100)
MCV RBC AUTO: 99 FL (ref 78–100)
MDC_IDC_EPISODE_DTM: NORMAL
MDC_IDC_EPISODE_DURATION: 1080 S
MDC_IDC_EPISODE_DURATION: 1200 S
MDC_IDC_EPISODE_DURATION: 360 S
MDC_IDC_EPISODE_DURATION: 360 S
MDC_IDC_EPISODE_DURATION: 600 S
MDC_IDC_EPISODE_DURATION: 720 S
MDC_IDC_EPISODE_DURATION: 840 S
MDC_IDC_EPISODE_DURATION: 840 S
MDC_IDC_EPISODE_DURATION: 960 S
MDC_IDC_EPISODE_ID: 236
MDC_IDC_EPISODE_ID: 237
MDC_IDC_EPISODE_ID: 238
MDC_IDC_EPISODE_ID: 239
MDC_IDC_EPISODE_ID: 240
MDC_IDC_EPISODE_ID: 241
MDC_IDC_EPISODE_ID: 242
MDC_IDC_EPISODE_ID: 243
MDC_IDC_EPISODE_ID: 244
MDC_IDC_EPISODE_TYPE: NORMAL
MDC_IDC_LEAD_IMPLANT_DT: NORMAL
MDC_IDC_LEAD_LOCATION: NORMAL
MDC_IDC_LEAD_LOCATION_DETAIL_1: NORMAL
MDC_IDC_LEAD_MFG: NORMAL
MDC_IDC_LEAD_MODEL: NORMAL
MDC_IDC_LEAD_POLARITY_TYPE: NORMAL
MDC_IDC_LEAD_SERIAL: NORMAL
MDC_IDC_LEAD_SPECIAL_FUNCTION: NORMAL
MDC_IDC_MSMT_BATTERY_DTM: NORMAL
MDC_IDC_MSMT_BATTERY_REMAINING_LONGEVITY: 101 MO
MDC_IDC_MSMT_BATTERY_REMAINING_LONGEVITY: 102 MO
MDC_IDC_MSMT_BATTERY_REMAINING_LONGEVITY: 104 MO
MDC_IDC_MSMT_BATTERY_REMAINING_LONGEVITY: 104 MO
MDC_IDC_MSMT_BATTERY_REMAINING_LONGEVITY: 108 MO
MDC_IDC_MSMT_BATTERY_RRT_TRIGGER: 2.73
MDC_IDC_MSMT_BATTERY_STATUS: NORMAL
MDC_IDC_MSMT_BATTERY_VOLTAGE: 2.97 V
MDC_IDC_MSMT_BATTERY_VOLTAGE: 2.99 V
MDC_IDC_MSMT_BATTERY_VOLTAGE: 3 V
MDC_IDC_MSMT_CAP_CHARGE_DTM: NORMAL
MDC_IDC_MSMT_CAP_CHARGE_ENERGY: 18 J
MDC_IDC_MSMT_CAP_CHARGE_TIME: 3.74
MDC_IDC_MSMT_CAP_CHARGE_TIME: 3.74
MDC_IDC_MSMT_CAP_CHARGE_TIME: 3.75
MDC_IDC_MSMT_CAP_CHARGE_TIME: 3.77
MDC_IDC_MSMT_CAP_CHARGE_TIME: 3.77
MDC_IDC_MSMT_CAP_CHARGE_TYPE: NORMAL
MDC_IDC_MSMT_LEADCHNL_RV_IMPEDANCE_VALUE: 304 OHM
MDC_IDC_MSMT_LEADCHNL_RV_IMPEDANCE_VALUE: 342 OHM
MDC_IDC_MSMT_LEADCHNL_RV_IMPEDANCE_VALUE: 399 OHM
MDC_IDC_MSMT_LEADCHNL_RV_PACING_THRESHOLD_AMPLITUDE: 0.5 V
MDC_IDC_MSMT_LEADCHNL_RV_PACING_THRESHOLD_AMPLITUDE: 0.62 V
MDC_IDC_MSMT_LEADCHNL_RV_PACING_THRESHOLD_AMPLITUDE: 0.75 V
MDC_IDC_MSMT_LEADCHNL_RV_PACING_THRESHOLD_PULSEWIDTH: 0.4 MS
MDC_IDC_MSMT_LEADCHNL_RV_SENSING_INTR_AMPL: 10.1 MV
MDC_IDC_MSMT_LEADCHNL_RV_SENSING_INTR_AMPL: 10.1 MV
MDC_IDC_MSMT_LEADCHNL_RV_SENSING_INTR_AMPL: 10.25 MV
MDC_IDC_MSMT_LEADCHNL_RV_SENSING_INTR_AMPL: 8.62 MV
MDC_IDC_MSMT_LEADCHNL_RV_SENSING_INTR_AMPL: 9.62 MV
MDC_IDC_MSMT_LEADCHNL_RV_SENSING_INTR_AMPL: 9.75 MV
MDC_IDC_PG_IMPLANT_DTM: NORMAL
MDC_IDC_PG_MFG: NORMAL
MDC_IDC_PG_MODEL: NORMAL
MDC_IDC_PG_SERIAL: NORMAL
MDC_IDC_PG_TYPE: NORMAL
MDC_IDC_SESS_CLINIC_NAME: NORMAL
MDC_IDC_SESS_DTM: NORMAL
MDC_IDC_SESS_TYPE: NORMAL
MDC_IDC_SET_BRADY_HYSTRATE: NORMAL
MDC_IDC_SET_BRADY_LOWRATE: 60 {BEATS}/MIN
MDC_IDC_SET_BRADY_MAX_SENSOR_RATE: 115 {BEATS}/MIN
MDC_IDC_SET_BRADY_MODE: NORMAL
MDC_IDC_SET_LEADCHNL_RV_PACING_AMPLITUDE: 2 V
MDC_IDC_SET_LEADCHNL_RV_PACING_ANODE_ELECTRODE_1: NORMAL
MDC_IDC_SET_LEADCHNL_RV_PACING_ANODE_LOCATION_1: NORMAL
MDC_IDC_SET_LEADCHNL_RV_PACING_CAPTURE_MODE: NORMAL
MDC_IDC_SET_LEADCHNL_RV_PACING_CATHODE_ELECTRODE_1: NORMAL
MDC_IDC_SET_LEADCHNL_RV_PACING_CATHODE_LOCATION_1: NORMAL
MDC_IDC_SET_LEADCHNL_RV_PACING_POLARITY: NORMAL
MDC_IDC_SET_LEADCHNL_RV_PACING_PULSEWIDTH: 0.4 MS
MDC_IDC_SET_LEADCHNL_RV_SENSING_ANODE_ELECTRODE_1: NORMAL
MDC_IDC_SET_LEADCHNL_RV_SENSING_ANODE_LOCATION_1: NORMAL
MDC_IDC_SET_LEADCHNL_RV_SENSING_CATHODE_ELECTRODE_1: NORMAL
MDC_IDC_SET_LEADCHNL_RV_SENSING_CATHODE_LOCATION_1: NORMAL
MDC_IDC_SET_LEADCHNL_RV_SENSING_POLARITY: NORMAL
MDC_IDC_SET_LEADCHNL_RV_SENSING_SENSITIVITY: 0.3 MV
MDC_IDC_SET_ZONE_DETECTION_BEATS_DENOMINATOR: 40 {BEATS}
MDC_IDC_SET_ZONE_DETECTION_BEATS_NUMERATOR: 30 {BEATS}
MDC_IDC_SET_ZONE_DETECTION_INTERVAL: 270 MS
MDC_IDC_SET_ZONE_DETECTION_INTERVAL: 460 MS
MDC_IDC_SET_ZONE_DETECTION_INTERVAL: 500 MS
MDC_IDC_SET_ZONE_DETECTION_INTERVAL: NORMAL
MDC_IDC_SET_ZONE_TYPE: NORMAL
MDC_IDC_STAT_AT_BURDEN_PERCENT: 0 %
MDC_IDC_STAT_AT_BURDEN_PERCENT: 0.3 %
MDC_IDC_STAT_AT_DTM_END: NORMAL
MDC_IDC_STAT_AT_DTM_START: NORMAL
MDC_IDC_STAT_BRADY_DTM_END: NORMAL
MDC_IDC_STAT_BRADY_DTM_START: NORMAL
MDC_IDC_STAT_BRADY_RV_PERCENT_PACED: 0 %
MDC_IDC_STAT_BRADY_RV_PERCENT_PACED: 0.1 %
MDC_IDC_STAT_BRADY_RV_PERCENT_PACED: 7.22 %
MDC_IDC_STAT_BRADY_RV_PERCENT_PACED: 81.03 %
MDC_IDC_STAT_BRADY_RV_PERCENT_PACED: 89.48 %
MDC_IDC_STAT_EPISODE_RECENT_COUNT: 0
MDC_IDC_STAT_EPISODE_RECENT_COUNT: 9
MDC_IDC_STAT_EPISODE_RECENT_COUNT_DTM_END: NORMAL
MDC_IDC_STAT_EPISODE_RECENT_COUNT_DTM_START: NORMAL
MDC_IDC_STAT_EPISODE_TOTAL_COUNT: 0
MDC_IDC_STAT_EPISODE_TOTAL_COUNT: 2
MDC_IDC_STAT_EPISODE_TOTAL_COUNT: 233
MDC_IDC_STAT_EPISODE_TOTAL_COUNT: 233
MDC_IDC_STAT_EPISODE_TOTAL_COUNT: 242
MDC_IDC_STAT_EPISODE_TOTAL_COUNT: 242
MDC_IDC_STAT_EPISODE_TOTAL_COUNT: 259
MDC_IDC_STAT_EPISODE_TOTAL_COUNT_DTM_END: NORMAL
MDC_IDC_STAT_EPISODE_TOTAL_COUNT_DTM_START: NORMAL
MDC_IDC_STAT_EPISODE_TYPE: NORMAL
MDC_IDC_STAT_TACHYTHERAPY_ATP_DELIVERED_RECENT: 0
MDC_IDC_STAT_TACHYTHERAPY_ATP_DELIVERED_TOTAL: 0
MDC_IDC_STAT_TACHYTHERAPY_RECENT_DTM_END: NORMAL
MDC_IDC_STAT_TACHYTHERAPY_RECENT_DTM_START: NORMAL
MDC_IDC_STAT_TACHYTHERAPY_SHOCKS_ABORTED_RECENT: 0
MDC_IDC_STAT_TACHYTHERAPY_SHOCKS_ABORTED_TOTAL: 0
MDC_IDC_STAT_TACHYTHERAPY_SHOCKS_DELIVERED_RECENT: 0
MDC_IDC_STAT_TACHYTHERAPY_SHOCKS_DELIVERED_TOTAL: 0
MDC_IDC_STAT_TACHYTHERAPY_TOTAL_DTM_END: NORMAL
MDC_IDC_STAT_TACHYTHERAPY_TOTAL_DTM_START: NORMAL
MISCELLANEOUS TEST 1 (ARUP): NORMAL
MONOCYTES # BLD AUTO: 0.6 10E3/UL (ref 0–1.3)
MONOCYTES # BLD AUTO: 0.7 10E3/UL (ref 0–1.3)
MONOCYTES # BLD AUTO: 0.8 10E3/UL (ref 0–1.3)
MONOCYTES # BLD AUTO: 0.9 10E3/UL (ref 0–1.3)
MONOCYTES # BLD AUTO: 1 10E3/UL (ref 0–1.3)
MONOCYTES # BLD AUTO: 1.1 10E3/UL (ref 0–1.3)
MONOCYTES # BLD AUTO: 1.2 10E3/UL (ref 0–1.3)
MONOCYTES # BLD AUTO: 1.2 10E3/UL (ref 0–1.3)
MONOCYTES # BLD AUTO: 1.3 10E3/UL (ref 0–1.3)
MONOCYTES # BLD AUTO: 1.5 10E3/UL (ref 0–1.3)
MONOCYTES NFR BLD AUTO: 10 %
MONOCYTES NFR BLD AUTO: 11 %
MONOCYTES NFR BLD AUTO: 12 %
MONOCYTES NFR BLD AUTO: 13 %
MONOCYTES NFR BLD AUTO: 14 %
MONOCYTES NFR BLD AUTO: 14 %
MONOCYTES NFR BLD AUTO: 16 %
MONOCYTES NFR BLD AUTO: 7 %
MONOCYTES NFR BLD AUTO: 7 %
MUCOUS THREADS #/AREA URNS LPF: PRESENT /LPF
NEUTROPHILS # BLD AUTO: 10 10E3/UL (ref 1.6–8.3)
NEUTROPHILS # BLD AUTO: 10 10E3/UL (ref 1.6–8.3)
NEUTROPHILS # BLD AUTO: 11.8 10E3/UL (ref 1.6–8.3)
NEUTROPHILS # BLD AUTO: 4.5 10E3/UL (ref 1.6–8.3)
NEUTROPHILS # BLD AUTO: 5 10E3/UL (ref 1.6–8.3)
NEUTROPHILS # BLD AUTO: 5.3 10E3/UL (ref 1.6–8.3)
NEUTROPHILS # BLD AUTO: 5.5 10E3/UL (ref 1.6–8.3)
NEUTROPHILS # BLD AUTO: 5.6 10E3/UL (ref 1.6–8.3)
NEUTROPHILS # BLD AUTO: 5.7 10E3/UL (ref 1.6–8.3)
NEUTROPHILS # BLD AUTO: 5.8 10E3/UL (ref 1.6–8.3)
NEUTROPHILS # BLD AUTO: 6.1 10E3/UL (ref 1.6–8.3)
NEUTROPHILS # BLD AUTO: 6.1 10E3/UL (ref 1.6–8.3)
NEUTROPHILS # BLD AUTO: 6.3 10E3/UL (ref 1.6–8.3)
NEUTROPHILS # BLD AUTO: 6.3 10E3/UL (ref 1.6–8.3)
NEUTROPHILS # BLD AUTO: 6.4 10E3/UL (ref 1.6–8.3)
NEUTROPHILS # BLD AUTO: 6.5 10E3/UL (ref 1.6–8.3)
NEUTROPHILS # BLD AUTO: 6.6 10E3/UL (ref 1.6–8.3)
NEUTROPHILS # BLD AUTO: 6.7 10E3/UL (ref 1.6–8.3)
NEUTROPHILS # BLD AUTO: 7 10E3/UL (ref 1.6–8.3)
NEUTROPHILS # BLD AUTO: 7.1 10E3/UL (ref 1.6–8.3)
NEUTROPHILS # BLD AUTO: 7.2 10E3/UL (ref 1.6–8.3)
NEUTROPHILS # BLD AUTO: 7.4 10E3/UL (ref 1.6–8.3)
NEUTROPHILS # BLD AUTO: 7.5 10E3/UL (ref 1.6–8.3)
NEUTROPHILS # BLD AUTO: 7.6 10E3/UL (ref 1.6–8.3)
NEUTROPHILS # BLD AUTO: 8.2 10E3/UL (ref 1.6–8.3)
NEUTROPHILS # BLD AUTO: 8.3 10E3/UL (ref 1.6–8.3)
NEUTROPHILS # BLD AUTO: 9.6 10E3/UL (ref 1.6–8.3)
NEUTROPHILS NFR BLD AUTO: 66 %
NEUTROPHILS NFR BLD AUTO: 69 %
NEUTROPHILS NFR BLD AUTO: 70 %
NEUTROPHILS NFR BLD AUTO: 71 %
NEUTROPHILS NFR BLD AUTO: 72 %
NEUTROPHILS NFR BLD AUTO: 72 %
NEUTROPHILS NFR BLD AUTO: 73 %
NEUTROPHILS NFR BLD AUTO: 74 %
NEUTROPHILS NFR BLD AUTO: 75 %
NEUTROPHILS NFR BLD AUTO: 76 %
NEUTROPHILS NFR BLD AUTO: 77 %
NEUTROPHILS NFR BLD AUTO: 79 %
NEUTROPHILS NFR BLD AUTO: 81 %
NEUTROPHILS NFR BLD AUTO: 84 %
NEUTROPHILS NFR BLD AUTO: 85 %
NITRATE UR QL: NEGATIVE
NONHDLC SERPL-MCNC: 79 MG/DL
NONHDLC SERPL-MCNC: 90 MG/DL
NOROV GI+II ORF1-ORF2 JNC STL QL NAA+PR: NOT DETECTED
NRBC # BLD AUTO: 0 10E3/UL
NRBC BLD AUTO-RTO: 0 /100
NT-PROBNP SERPL-MCNC: 1452 PG/ML (ref 0–900)
NT-PROBNP SERPL-MCNC: 2231 PG/ML (ref 0–900)
NT-PROBNP SERPL-MCNC: 2572 PG/ML (ref 0–900)
NT-PROBNP SERPL-MCNC: 3352 PG/ML (ref 0–900)
NT-PROBNP SERPL-MCNC: 4193 PG/ML (ref 0–900)
NT-PROBNP SERPL-MCNC: 8824 PG/ML (ref 0–900)
O2/TOTAL GAS SETTING VFR VENT: 21 %
O2/TOTAL GAS SETTING VFR VENT: 21 %
OXYHGB MFR BLDV: 47 % (ref 70–75)
OXYHGB MFR BLDV: 56 % (ref 92–100)
OXYHGB MFR BLDV: 62 % (ref 92–100)
OXYHGB MFR BLDV: 62 % (ref 92–100)
OXYHGB MFR BLDV: 63 % (ref 70–75)
OXYHGB MFR BLDV: 65 % (ref 92–100)
OXYHGB MFR BLDV: 66 % (ref 92–100)
OXYHGB MFR BLDV: 67 % (ref 92–100)
OXYHGB MFR BLDV: 81 % (ref 92–100)
P AXIS - MUSE: 108 DEGREES
P AXIS - MUSE: 14 DEGREES
P AXIS - MUSE: 25 DEGREES
P AXIS - MUSE: 70 DEGREES
P AXIS - MUSE: NORMAL DEGREES
PATH REPORT.COMMENTS IMP SPEC: NORMAL
PATH REPORT.FINAL DX SPEC: NORMAL
PATH REPORT.FINAL DX SPEC: NORMAL
PATH REPORT.GROSS SPEC: NORMAL
PATH REPORT.GROSS SPEC: NORMAL
PATH REPORT.MICROSCOPIC SPEC OTHER STN: NORMAL
PATH REPORT.RELEVANT HX SPEC: NORMAL
PATH REPORT.RELEVANT HX SPEC: NORMAL
PCO2 BLDV: 38 MM HG (ref 40–50)
PCO2 BLDV: 47 MM HG (ref 40–50)
PERFORMING LABORATORY: NORMAL
PH BLDV: 7.33 [PH] (ref 7.32–7.43)
PH BLDV: 7.45 [PH] (ref 7.32–7.43)
PH UR STRIP: 5 [PH] (ref 5–7)
PH UR STRIP: 5 [PH] (ref 5–7)
PH UR STRIP: 6.5 [PH] (ref 5–7)
PH UR STRIP: 6.5 [PH] (ref 5–7)
PHOSPHATE SERPL-MCNC: 3.3 MG/DL (ref 2.5–4.5)
PHOTO IMAGE: NORMAL
PHOTO IMAGE: NORMAL
PLATELET # BLD AUTO: 164 10E3/UL (ref 150–450)
PLATELET # BLD AUTO: 168 10E3/UL (ref 150–450)
PLATELET # BLD AUTO: 173 10E3/UL (ref 150–450)
PLATELET # BLD AUTO: 173 10E3/UL (ref 150–450)
PLATELET # BLD AUTO: 175 10E3/UL (ref 150–450)
PLATELET # BLD AUTO: 175 10E3/UL (ref 150–450)
PLATELET # BLD AUTO: 177 10E3/UL (ref 150–450)
PLATELET # BLD AUTO: 180 10E3/UL (ref 150–450)
PLATELET # BLD AUTO: 181 10E3/UL (ref 150–450)
PLATELET # BLD AUTO: 183 10E3/UL (ref 150–450)
PLATELET # BLD AUTO: 186 10E3/UL (ref 150–450)
PLATELET # BLD AUTO: 186 10E3/UL (ref 150–450)
PLATELET # BLD AUTO: 187 10E3/UL (ref 150–450)
PLATELET # BLD AUTO: 187 10E3/UL (ref 150–450)
PLATELET # BLD AUTO: 188 10E3/UL (ref 150–450)
PLATELET # BLD AUTO: 189 10E3/UL (ref 150–450)
PLATELET # BLD AUTO: 190 10E3/UL (ref 150–450)
PLATELET # BLD AUTO: 191 10E3/UL (ref 150–450)
PLATELET # BLD AUTO: 192 10E3/UL (ref 150–450)
PLATELET # BLD AUTO: 193 10E3/UL (ref 150–450)
PLATELET # BLD AUTO: 193 10E3/UL (ref 150–450)
PLATELET # BLD AUTO: 194 10E3/UL (ref 150–450)
PLATELET # BLD AUTO: 194 10E3/UL (ref 150–450)
PLATELET # BLD AUTO: 199 10E3/UL (ref 150–450)
PLATELET # BLD AUTO: 200 10E3/UL (ref 150–450)
PLATELET # BLD AUTO: 200 10E3/UL (ref 150–450)
PLATELET # BLD AUTO: 201 10E3/UL (ref 150–450)
PLATELET # BLD AUTO: 202 10E3/UL (ref 150–450)
PLATELET # BLD AUTO: 203 10E3/UL (ref 150–450)
PLATELET # BLD AUTO: 203 10E3/UL (ref 150–450)
PLATELET # BLD AUTO: 205 10E3/UL (ref 150–450)
PLATELET # BLD AUTO: 206 10E3/UL (ref 150–450)
PLATELET # BLD AUTO: 206 10E3/UL (ref 150–450)
PLATELET # BLD AUTO: 207 10E3/UL (ref 150–450)
PLATELET # BLD AUTO: 208 10E3/UL (ref 150–450)
PLATELET # BLD AUTO: 209 10E3/UL (ref 150–450)
PLATELET # BLD AUTO: 210 10E3/UL (ref 150–450)
PLATELET # BLD AUTO: 210 10E3/UL (ref 150–450)
PLATELET # BLD AUTO: 212 10E3/UL (ref 150–450)
PLATELET # BLD AUTO: 215 10E3/UL (ref 150–450)
PLATELET # BLD AUTO: 216 10E3/UL (ref 150–450)
PLATELET # BLD AUTO: 217 10E3/UL (ref 150–450)
PLATELET # BLD AUTO: 217 10E3/UL (ref 150–450)
PLATELET # BLD AUTO: 218 10E3/UL (ref 150–450)
PLATELET # BLD AUTO: 220 10E3/UL (ref 150–450)
PLATELET # BLD AUTO: 223 10E3/UL (ref 150–450)
PLATELET # BLD AUTO: 223 10E3/UL (ref 150–450)
PLATELET # BLD AUTO: 226 10E3/UL (ref 150–450)
PLATELET # BLD AUTO: 237 10E3/UL (ref 150–450)
PLATELET # BLD AUTO: 238 10E3/UL (ref 150–450)
PLATELET # BLD AUTO: 241 10E3/UL (ref 150–450)
PLATELET # BLD AUTO: 250 10E3/UL (ref 150–450)
PLATELET # BLD AUTO: 253 10E3/UL (ref 150–450)
PLATELET # BLD AUTO: 255 10E3/UL (ref 150–450)
PLATELET # BLD AUTO: 273 10E3/UL (ref 150–450)
PLATELET # BLD AUTO: 278 10E3/UL (ref 150–450)
PLATELET # BLD AUTO: 279 10E3/UL (ref 150–450)
PLATELET # BLD AUTO: 281 10E3/UL (ref 150–450)
PLATELET # BLD AUTO: 283 10E3/UL (ref 150–450)
PLATELET # BLD AUTO: 283 10E3/UL (ref 150–450)
PLATELET # BLD AUTO: 299 10E3/UL (ref 150–450)
PLATELET # BLD AUTO: 311 10E3/UL (ref 150–450)
PLATELET # BLD AUTO: 315 10E3/UL (ref 150–450)
PLATELET # BLD AUTO: 327 10E3/UL (ref 150–450)
PLATELET # BLD AUTO: 336 10E3/UL (ref 150–450)
PLATELET # BLD AUTO: 342 10E3/UL (ref 150–450)
PLATELET # BLD AUTO: 349 10E3/UL (ref 150–450)
PLATELET # BLD AUTO: 349 10E3/UL (ref 150–450)
PO2 BLDV: 32 MM HG (ref 25–47)
PO2 BLDV: 36 MM HG (ref 25–47)
POTASSIUM BLD-SCNC: 2.6 MMOL/L (ref 3.4–5.3)
POTASSIUM SERPL-SCNC: 2.6 MMOL/L (ref 3.4–5.3)
POTASSIUM SERPL-SCNC: 2.7 MMOL/L (ref 3.4–5.3)
POTASSIUM SERPL-SCNC: 2.8 MMOL/L (ref 3.4–5.3)
POTASSIUM SERPL-SCNC: 2.9 MMOL/L (ref 3.4–5.3)
POTASSIUM SERPL-SCNC: 3 MMOL/L (ref 3.4–5.3)
POTASSIUM SERPL-SCNC: 3.1 MMOL/L (ref 3.4–5.3)
POTASSIUM SERPL-SCNC: 3.2 MMOL/L (ref 3.4–5.3)
POTASSIUM SERPL-SCNC: 3.3 MMOL/L (ref 3.4–5.3)
POTASSIUM SERPL-SCNC: 3.4 MMOL/L (ref 3.4–5.3)
POTASSIUM SERPL-SCNC: 3.5 MMOL/L (ref 3.4–5.3)
POTASSIUM SERPL-SCNC: 3.6 MMOL/L (ref 3.4–5.3)
POTASSIUM SERPL-SCNC: 3.7 MMOL/L (ref 3.4–5.3)
POTASSIUM SERPL-SCNC: 3.8 MMOL/L (ref 3.4–5.3)
POTASSIUM SERPL-SCNC: 3.9 MMOL/L (ref 3.4–5.3)
POTASSIUM SERPL-SCNC: 4 MMOL/L (ref 3.4–5.3)
POTASSIUM SERPL-SCNC: 4.1 MMOL/L (ref 3.4–5.3)
POTASSIUM SERPL-SCNC: 4.2 MMOL/L (ref 3.4–5.3)
POTASSIUM SERPL-SCNC: 4.3 MMOL/L (ref 3.4–5.3)
POTASSIUM SERPL-SCNC: 4.4 MMOL/L (ref 3.4–5.3)
POTASSIUM SERPL-SCNC: 4.5 MMOL/L (ref 3.4–5.3)
POTASSIUM SERPL-SCNC: 4.6 MMOL/L (ref 3.4–5.3)
POTASSIUM SERPL-SCNC: 4.7 MMOL/L (ref 3.4–5.3)
POTASSIUM SERPL-SCNC: 4.8 MMOL/L (ref 3.4–5.3)
POTASSIUM SERPL-SCNC: 4.9 MMOL/L (ref 3.4–5.3)
POTASSIUM SERPL-SCNC: 5 MMOL/L (ref 3.4–5.3)
POTASSIUM SERPL-SCNC: 5 MMOL/L (ref 3.4–5.3)
POTASSIUM SERPL-SCNC: 5.1 MMOL/L (ref 3.4–5.3)
POTASSIUM SERPL-SCNC: 5.2 MMOL/L (ref 3.4–5.3)
POTASSIUM SERPL-SCNC: 5.4 MMOL/L (ref 3.4–5.3)
POTASSIUM SERPL-SCNC: 5.5 MMOL/L (ref 3.4–5.3)
POTASSIUM UR-SCNC: 52.8 MMOL/L
PR INTERVAL - MUSE: 124 MS
PR INTERVAL - MUSE: 132 MS
PR INTERVAL - MUSE: 214 MS
PR INTERVAL - MUSE: 30 MS
PR INTERVAL - MUSE: 88 MS
PR INTERVAL - MUSE: 92 MS
PR INTERVAL - MUSE: NORMAL MS
PREALB SERPL IA-MCNC: 23 MG/DL (ref 15–45)
PROT SERPL-MCNC: 6.5 G/DL (ref 6.4–8.3)
PROT SERPL-MCNC: 6.7 G/DL (ref 6.4–8.3)
PROT SERPL-MCNC: 6.8 G/DL (ref 6.4–8.3)
PROT SERPL-MCNC: 6.8 G/DL (ref 6.4–8.3)
PROT SERPL-MCNC: 6.9 G/DL (ref 6.4–8.3)
PROT SERPL-MCNC: 7 G/DL (ref 6.4–8.3)
PROT SERPL-MCNC: 7.3 G/DL (ref 6.4–8.3)
PROT SERPL-MCNC: 7.4 G/DL (ref 6.4–8.3)
PROT SERPL-MCNC: 7.6 G/DL (ref 6.4–8.3)
PROT SERPL-MCNC: 7.7 G/DL (ref 6.4–8.3)
PROT SERPL-MCNC: 8.1 G/DL (ref 6.4–8.3)
QRS DURATION - MUSE: 162 MS
QRS DURATION - MUSE: 162 MS
QRS DURATION - MUSE: 170 MS
QRS DURATION - MUSE: 172 MS
QRS DURATION - MUSE: 180 MS
QRS DURATION - MUSE: 198 MS
QRS DURATION - MUSE: 20 MS
QRS DURATION - MUSE: 20 MS
QRS DURATION - MUSE: 254 MS
QRS DURATION - MUSE: 416 MS
QRS DURATION - MUSE: 68 MS
QRS DURATION - MUSE: 8 MS
QT - MUSE: 368 MS
QT - MUSE: 390 MS
QT - MUSE: 396 MS
QT - MUSE: 418 MS
QT - MUSE: 462 MS
QT - MUSE: 466 MS
QT - MUSE: 488 MS
QT - MUSE: 492 MS
QT - MUSE: 576 MS
QT - MUSE: 604 MS
QT - MUSE: 624 MS
QT - MUSE: 684 MS
QTC - MUSE: 486 MS
QTC - MUSE: 489 MS
QTC - MUSE: 503 MS
QTC - MUSE: 522 MS
QTC - MUSE: 522 MS
QTC - MUSE: 584 MS
QTC - MUSE: 599 MS
QTC - MUSE: 607 MS
QTC - MUSE: 624 MS
QTC - MUSE: 643 MS
QTC - MUSE: 684 MS
QTC - MUSE: 783 MS
R AXIS - MUSE: -33 DEGREES
R AXIS - MUSE: -35 DEGREES
R AXIS - MUSE: -81 DEGREES
R AXIS - MUSE: -85 DEGREES
R AXIS - MUSE: -87 DEGREES
R AXIS - MUSE: -89 DEGREES
R AXIS - MUSE: 10 DEGREES
R AXIS - MUSE: 160 DEGREES
R AXIS - MUSE: 180 DEGREES
R AXIS - MUSE: 186 DEGREES
R AXIS - MUSE: 193 DEGREES
R AXIS - MUSE: 240 DEGREES
RBC # BLD AUTO: 2.28 10E6/UL (ref 4.4–5.9)
RBC # BLD AUTO: 2.5 10E6/UL (ref 4.4–5.9)
RBC # BLD AUTO: 2.52 10E6/UL (ref 4.4–5.9)
RBC # BLD AUTO: 2.56 10E6/UL (ref 4.4–5.9)
RBC # BLD AUTO: 2.57 10E6/UL (ref 4.4–5.9)
RBC # BLD AUTO: 2.61 10E6/UL (ref 4.4–5.9)
RBC # BLD AUTO: 2.62 10E6/UL (ref 4.4–5.9)
RBC # BLD AUTO: 2.62 10E6/UL (ref 4.4–5.9)
RBC # BLD AUTO: 2.63 10E6/UL (ref 4.4–5.9)
RBC # BLD AUTO: 2.63 10E6/UL (ref 4.4–5.9)
RBC # BLD AUTO: 2.68 10E6/UL (ref 4.4–5.9)
RBC # BLD AUTO: 2.77 10E6/UL (ref 4.4–5.9)
RBC # BLD AUTO: 2.8 10E6/UL (ref 4.4–5.9)
RBC # BLD AUTO: 2.8 10E6/UL (ref 4.4–5.9)
RBC # BLD AUTO: 2.82 10E6/UL (ref 4.4–5.9)
RBC # BLD AUTO: 2.83 10E6/UL (ref 4.4–5.9)
RBC # BLD AUTO: 2.84 10E6/UL (ref 4.4–5.9)
RBC # BLD AUTO: 2.85 10E6/UL (ref 4.4–5.9)
RBC # BLD AUTO: 2.86 10E6/UL (ref 4.4–5.9)
RBC # BLD AUTO: 2.87 10E6/UL (ref 4.4–5.9)
RBC # BLD AUTO: 2.89 10E6/UL (ref 4.4–5.9)
RBC # BLD AUTO: 2.92 10E6/UL (ref 4.4–5.9)
RBC # BLD AUTO: 2.92 10E6/UL (ref 4.4–5.9)
RBC # BLD AUTO: 2.93 10E6/UL (ref 4.4–5.9)
RBC # BLD AUTO: 2.96 10E6/UL (ref 4.4–5.9)
RBC # BLD AUTO: 2.98 10E6/UL (ref 4.4–5.9)
RBC # BLD AUTO: 3.01 10E6/UL (ref 4.4–5.9)
RBC # BLD AUTO: 3.03 10E6/UL (ref 4.4–5.9)
RBC # BLD AUTO: 3.05 10E6/UL (ref 4.4–5.9)
RBC # BLD AUTO: 3.06 10E6/UL (ref 4.4–5.9)
RBC # BLD AUTO: 3.06 10E6/UL (ref 4.4–5.9)
RBC # BLD AUTO: 3.15 10E6/UL (ref 4.4–5.9)
RBC # BLD AUTO: 3.17 10E6/UL (ref 4.4–5.9)
RBC # BLD AUTO: 3.17 10E6/UL (ref 4.4–5.9)
RBC # BLD AUTO: 3.18 10E6/UL (ref 4.4–5.9)
RBC # BLD AUTO: 3.2 10E6/UL (ref 4.4–5.9)
RBC # BLD AUTO: 3.22 10E6/UL (ref 4.4–5.9)
RBC # BLD AUTO: 3.23 10E6/UL (ref 4.4–5.9)
RBC # BLD AUTO: 3.27 10E6/UL (ref 4.4–5.9)
RBC # BLD AUTO: 3.27 10E6/UL (ref 4.4–5.9)
RBC # BLD AUTO: 3.29 10E6/UL (ref 4.4–5.9)
RBC # BLD AUTO: 3.29 10E6/UL (ref 4.4–5.9)
RBC # BLD AUTO: 3.31 10E6/UL (ref 4.4–5.9)
RBC # BLD AUTO: 3.33 10E6/UL (ref 4.4–5.9)
RBC # BLD AUTO: 3.35 10E6/UL (ref 4.4–5.9)
RBC # BLD AUTO: 3.39 10E6/UL (ref 4.4–5.9)
RBC # BLD AUTO: 3.44 10E6/UL (ref 4.4–5.9)
RBC # BLD AUTO: 3.49 10E6/UL (ref 4.4–5.9)
RBC # BLD AUTO: 3.52 10E6/UL (ref 4.4–5.9)
RBC # BLD AUTO: 3.61 10E6/UL (ref 4.4–5.9)
RBC # BLD AUTO: 3.63 10E6/UL (ref 4.4–5.9)
RBC # BLD AUTO: 3.67 10E6/UL (ref 4.4–5.9)
RBC # BLD AUTO: 3.68 10E6/UL (ref 4.4–5.9)
RBC # BLD AUTO: 3.68 10E6/UL (ref 4.4–5.9)
RBC # BLD AUTO: 3.69 10E6/UL (ref 4.4–5.9)
RBC # BLD AUTO: 3.74 10E6/UL (ref 4.4–5.9)
RBC # BLD AUTO: 3.8 10E6/UL (ref 4.4–5.9)
RBC # BLD AUTO: 3.84 10E6/UL (ref 4.4–5.9)
RBC # BLD AUTO: 4.07 10E6/UL (ref 4.4–5.9)
RBC # BLD AUTO: 4.08 10E6/UL (ref 4.4–5.9)
RBC # BLD AUTO: 4.15 10E6/UL (ref 4.4–5.9)
RBC # BLD AUTO: 4.24 10E6/UL (ref 4.4–5.9)
RBC # BLD AUTO: 4.24 10E6/UL (ref 4.4–5.9)
RBC # BLD AUTO: 4.28 10E6/UL (ref 4.4–5.9)
RBC # BLD AUTO: 4.38 10E6/UL (ref 4.4–5.9)
RBC # BLD AUTO: 4.41 10E6/UL (ref 4.4–5.9)
RBC # BLD AUTO: 4.42 10E6/UL (ref 4.4–5.9)
RBC # BLD AUTO: 4.48 10E6/UL (ref 4.4–5.9)
RBC # BLD AUTO: 4.53 10E6/UL (ref 4.4–5.9)
RBC # BLD AUTO: 4.65 10E6/UL (ref 4.4–5.9)
RBC # BLD AUTO: 4.66 10E6/UL (ref 4.4–5.9)
RBC # BLD AUTO: 4.68 10E6/UL (ref 4.4–5.9)
RBC # BLD AUTO: 4.79 10E6/UL (ref 4.4–5.9)
RBC # BLD AUTO: 4.8 10E6/UL (ref 4.4–5.9)
RBC URINE: 0 /HPF
RBC URINE: 0 /HPF
RBC URINE: <1 /HPF
RBC URINE: <1 /HPF
RENIN PLAS-CCNC: 37.8 NG/ML/HR
RVA NSP5 STL QL NAA+PROBE: NOT DETECTED
SALMONELLA SP RPOD STL QL NAA+PROBE: NOT DETECTED
SARS-COV-2 RNA RESP QL NAA+PROBE: NEGATIVE
SHIGELLA SP+EIEC IPAH STL QL NAA+PROBE: NOT DETECTED
SODIUM SERPL-SCNC: 126 MMOL/L (ref 136–145)
SODIUM SERPL-SCNC: 126 MMOL/L (ref 136–145)
SODIUM SERPL-SCNC: 127 MMOL/L (ref 136–145)
SODIUM SERPL-SCNC: 128 MMOL/L (ref 136–145)
SODIUM SERPL-SCNC: 129 MMOL/L (ref 136–145)
SODIUM SERPL-SCNC: 130 MMOL/L (ref 136–145)
SODIUM SERPL-SCNC: 131 MMOL/L (ref 136–145)
SODIUM SERPL-SCNC: 132 MMOL/L (ref 136–145)
SODIUM SERPL-SCNC: 133 MMOL/L (ref 136–145)
SODIUM SERPL-SCNC: 134 MMOL/L (ref 136–145)
SODIUM SERPL-SCNC: 135 MMOL/L (ref 136–145)
SODIUM SERPL-SCNC: 136 MMOL/L (ref 136–145)
SODIUM SERPL-SCNC: 137 MMOL/L (ref 136–145)
SODIUM SERPL-SCNC: 138 MMOL/L (ref 136–145)
SODIUM SERPL-SCNC: 139 MMOL/L (ref 136–145)
SODIUM SERPL-SCNC: 140 MMOL/L (ref 136–145)
SODIUM SERPL-SCNC: 141 MMOL/L (ref 136–145)
SODIUM SERPL-SCNC: 141 MMOL/L (ref 136–145)
SODIUM SERPL-SCNC: 142 MMOL/L (ref 136–145)
SP GR UR STRIP: 1.01 (ref 1–1.03)
SPECIMEN EXPIRATION DATE: NORMAL
SPECIMEN STATUS: NORMAL
SQUAMOUS EPITHELIAL: <1 /HPF
SQUAMOUS EPITHELIAL: <1 /HPF
SYSTOLIC BLOOD PRESSURE - MUSE: NORMAL MMHG
T AXIS - MUSE: 105 DEGREES
T AXIS - MUSE: 110 DEGREES
T AXIS - MUSE: 118 DEGREES
T AXIS - MUSE: 157 DEGREES
T AXIS - MUSE: 45 DEGREES
T AXIS - MUSE: 62 DEGREES
T AXIS - MUSE: 75 DEGREES
T AXIS - MUSE: 79 DEGREES
T AXIS - MUSE: 82 DEGREES
T AXIS - MUSE: 86 DEGREES
T AXIS - MUSE: 97 DEGREES
T AXIS - MUSE: 98 DEGREES
T4 FREE SERPL-MCNC: 0.76 NG/DL (ref 0.9–1.7)
T4 FREE SERPL-MCNC: 0.97 NG/DL (ref 0.9–1.7)
T4 FREE SERPL-MCNC: 1.21 NG/DL (ref 0.9–1.7)
T4 FREE SERPL-MCNC: 1.34 NG/DL (ref 0.9–1.7)
T4 FREE SERPL-MCNC: 1.44 NG/DL (ref 0.9–1.7)
T4 FREE SERPL-MCNC: 1.48 NG/DL (ref 0.9–1.7)
TEST NAME: NORMAL
TRIGL SERPL-MCNC: 53 MG/DL
TRIGL SERPL-MCNC: 64 MG/DL
TROPONIN T SERPL HS-MCNC: 104 NG/L
TROPONIN T SERPL HS-MCNC: 148 NG/L
TROPONIN T SERPL HS-MCNC: 155 NG/L
TROPONIN T SERPL HS-MCNC: 163 NG/L
TROPONIN T SERPL HS-MCNC: 163 NG/L
TROPONIN T SERPL HS-MCNC: 174 NG/L
TSH SERPL DL<=0.005 MIU/L-ACNC: 11.3 UIU/ML (ref 0.3–4.2)
TSH SERPL DL<=0.005 MIU/L-ACNC: 12.3 UIU/ML (ref 0.3–4.2)
TSH SERPL DL<=0.005 MIU/L-ACNC: 7.56 UIU/ML (ref 0.3–4.2)
TSH SERPL DL<=0.005 MIU/L-ACNC: 7.78 UIU/ML (ref 0.3–4.2)
TSH SERPL DL<=0.005 MIU/L-ACNC: 8.09 UIU/ML (ref 0.3–4.2)
TSH SERPL DL<=0.005 MIU/L-ACNC: 9.14 UIU/ML (ref 0.3–4.2)
UNIT ABO/RH: NORMAL
UNIT NUMBER: NORMAL
UNIT STATUS: NORMAL
UNIT TYPE ISBT: 600
UPPER GI ENDOSCOPY: NORMAL
URATE SERPL-MCNC: 6 MG/DL (ref 3.4–7)
URATE SERPL-MCNC: 7 MG/DL (ref 3.4–7)
UROBILINOGEN UR STRIP-MCNC: NORMAL MG/DL
V CHOL+PARA RFBL+TRKH+TNAA STL QL NAA+PR: NOT DETECTED
VENTRICULAR RATE- MUSE: 100 BPM
VENTRICULAR RATE- MUSE: 101 BPM
VENTRICULAR RATE- MUSE: 101 BPM
VENTRICULAR RATE- MUSE: 102 BPM
VENTRICULAR RATE- MUSE: 105 BPM
VENTRICULAR RATE- MUSE: 60 BPM
VENTRICULAR RATE- MUSE: 62 BPM
VENTRICULAR RATE- MUSE: 64 BPM
VENTRICULAR RATE- MUSE: 69 BPM
VENTRICULAR RATE- MUSE: 92 BPM
VENTRICULAR RATE- MUSE: 94 BPM
VENTRICULAR RATE- MUSE: 97 BPM
VIT B12 SERPL-MCNC: 1493 PG/ML (ref 232–1245)
WBC # BLD AUTO: 10 10E3/UL (ref 4–11)
WBC # BLD AUTO: 10.1 10E3/UL (ref 4–11)
WBC # BLD AUTO: 10.2 10E3/UL (ref 4–11)
WBC # BLD AUTO: 10.5 10E3/UL (ref 4–11)
WBC # BLD AUTO: 10.6 10E3/UL (ref 4–11)
WBC # BLD AUTO: 10.9 10E3/UL (ref 4–11)
WBC # BLD AUTO: 11.3 10E3/UL (ref 4–11)
WBC # BLD AUTO: 11.7 10E3/UL (ref 4–11)
WBC # BLD AUTO: 11.9 10E3/UL (ref 4–11)
WBC # BLD AUTO: 12.4 10E3/UL (ref 4–11)
WBC # BLD AUTO: 12.7 10E3/UL (ref 4–11)
WBC # BLD AUTO: 14.7 10E3/UL (ref 4–11)
WBC # BLD AUTO: 5.7 10E3/UL (ref 4–11)
WBC # BLD AUTO: 5.8 10E3/UL (ref 4–11)
WBC # BLD AUTO: 5.9 10E3/UL (ref 4–11)
WBC # BLD AUTO: 6.6 10E3/UL (ref 4–11)
WBC # BLD AUTO: 6.6 10E3/UL (ref 4–11)
WBC # BLD AUTO: 6.7 10E3/UL (ref 4–11)
WBC # BLD AUTO: 6.8 10E3/UL (ref 4–11)
WBC # BLD AUTO: 6.8 10E3/UL (ref 4–11)
WBC # BLD AUTO: 6.9 10E3/UL (ref 4–11)
WBC # BLD AUTO: 7.1 10E3/UL (ref 4–11)
WBC # BLD AUTO: 7.1 10E3/UL (ref 4–11)
WBC # BLD AUTO: 7.2 10E3/UL (ref 4–11)
WBC # BLD AUTO: 7.3 10E3/UL (ref 4–11)
WBC # BLD AUTO: 7.3 10E3/UL (ref 4–11)
WBC # BLD AUTO: 7.6 10E3/UL (ref 4–11)
WBC # BLD AUTO: 7.7 10E3/UL (ref 4–11)
WBC # BLD AUTO: 7.7 10E3/UL (ref 4–11)
WBC # BLD AUTO: 7.8 10E3/UL (ref 4–11)
WBC # BLD AUTO: 7.8 10E3/UL (ref 4–11)
WBC # BLD AUTO: 7.9 10E3/UL (ref 4–11)
WBC # BLD AUTO: 8 10E3/UL (ref 4–11)
WBC # BLD AUTO: 8 10E3/UL (ref 4–11)
WBC # BLD AUTO: 8.1 10E3/UL (ref 4–11)
WBC # BLD AUTO: 8.2 10E3/UL (ref 4–11)
WBC # BLD AUTO: 8.3 10E3/UL (ref 4–11)
WBC # BLD AUTO: 8.3 10E3/UL (ref 4–11)
WBC # BLD AUTO: 8.4 10E3/UL (ref 4–11)
WBC # BLD AUTO: 8.5 10E3/UL (ref 4–11)
WBC # BLD AUTO: 8.6 10E3/UL (ref 4–11)
WBC # BLD AUTO: 8.6 10E3/UL (ref 4–11)
WBC # BLD AUTO: 8.7 10E3/UL (ref 4–11)
WBC # BLD AUTO: 8.9 10E3/UL (ref 4–11)
WBC # BLD AUTO: 9 10E3/UL (ref 4–11)
WBC # BLD AUTO: 9.2 10E3/UL (ref 4–11)
WBC # BLD AUTO: 9.2 10E3/UL (ref 4–11)
WBC # BLD AUTO: 9.3 10E3/UL (ref 4–11)
WBC # BLD AUTO: 9.4 10E3/UL (ref 4–11)
WBC # BLD AUTO: 9.5 10E3/UL (ref 4–11)
WBC # BLD AUTO: 9.5 10E3/UL (ref 4–11)
WBC # BLD AUTO: 9.6 10E3/UL (ref 4–11)
WBC # BLD AUTO: 9.7 10E3/UL (ref 4–11)
WBC # BLD AUTO: 9.7 10E3/UL (ref 4–11)
WBC # BLD AUTO: 9.8 10E3/UL (ref 4–11)
WBC URINE: 1 /HPF
WBC URINE: <1 /HPF
Y ENTERO RECN STL QL NAA+PROBE: NOT DETECTED

## 2023-01-01 PROCEDURE — 93264 REM MNTR WRLS P-ART PRS SNR: CPT | Performed by: NURSE PRACTITIONER

## 2023-01-01 PROCEDURE — 250N000011 HC RX IP 250 OP 636: Performed by: NURSE PRACTITIONER

## 2023-01-01 PROCEDURE — 99223 1ST HOSP IP/OBS HIGH 75: CPT | Performed by: INTERNAL MEDICINE

## 2023-01-01 PROCEDURE — 70450 CT HEAD/BRAIN W/O DYE: CPT | Mod: 26 | Performed by: RADIOLOGY

## 2023-01-01 PROCEDURE — 97535 SELF CARE MNGMENT TRAINING: CPT | Mod: GO | Performed by: OCCUPATIONAL THERAPIST

## 2023-01-01 PROCEDURE — 84132 ASSAY OF SERUM POTASSIUM: CPT

## 2023-01-01 PROCEDURE — 83735 ASSAY OF MAGNESIUM: CPT

## 2023-01-01 PROCEDURE — 36592 COLLECT BLOOD FROM PICC: CPT | Performed by: PHYSICIAN ASSISTANT

## 2023-01-01 PROCEDURE — 99232 SBSQ HOSP IP/OBS MODERATE 35: CPT | Mod: 25 | Performed by: NURSE PRACTITIONER

## 2023-01-01 PROCEDURE — 85610 PROTHROMBIN TIME: CPT | Performed by: PHYSICIAN ASSISTANT

## 2023-01-01 PROCEDURE — 250N000013 HC RX MED GY IP 250 OP 250 PS 637: Performed by: INTERNAL MEDICINE

## 2023-01-01 PROCEDURE — 84132 ASSAY OF SERUM POTASSIUM: CPT | Performed by: INTERNAL MEDICINE

## 2023-01-01 PROCEDURE — 250N000013 HC RX MED GY IP 250 OP 250 PS 637: Performed by: PHYSICIAN ASSISTANT

## 2023-01-01 PROCEDURE — 258N000003 HC RX IP 258 OP 636: Performed by: NURSE PRACTITIONER

## 2023-01-01 PROCEDURE — 250N000013 HC RX MED GY IP 250 OP 250 PS 637: Performed by: STUDENT IN AN ORGANIZED HEALTH CARE EDUCATION/TRAINING PROGRAM

## 2023-01-01 PROCEDURE — 84132 ASSAY OF SERUM POTASSIUM: CPT | Performed by: NURSE PRACTITIONER

## 2023-01-01 PROCEDURE — 85610 PROTHROMBIN TIME: CPT | Performed by: PATHOLOGY

## 2023-01-01 PROCEDURE — 250N000013 HC RX MED GY IP 250 OP 250 PS 637: Performed by: NURSE PRACTITIONER

## 2023-01-01 PROCEDURE — 83735 ASSAY OF MAGNESIUM: CPT | Performed by: NURSE PRACTITIONER

## 2023-01-01 PROCEDURE — 99239 HOSP IP/OBS DSCHRG MGMT >30: CPT | Mod: 25 | Performed by: NURSE PRACTITIONER

## 2023-01-01 PROCEDURE — 250N000011 HC RX IP 250 OP 636: Performed by: INTERNAL MEDICINE

## 2023-01-01 PROCEDURE — 97530 THERAPEUTIC ACTIVITIES: CPT | Mod: GO | Performed by: OCCUPATIONAL THERAPIST

## 2023-01-01 PROCEDURE — 85025 COMPLETE CBC W/AUTO DIFF WBC: CPT | Performed by: STUDENT IN AN ORGANIZED HEALTH CARE EDUCATION/TRAINING PROGRAM

## 2023-01-01 PROCEDURE — 99215 OFFICE O/P EST HI 40 MIN: CPT | Mod: 25 | Performed by: PHYSICIAN ASSISTANT

## 2023-01-01 PROCEDURE — 97110 THERAPEUTIC EXERCISES: CPT | Mod: GP

## 2023-01-01 PROCEDURE — 85610 PROTHROMBIN TIME: CPT

## 2023-01-01 PROCEDURE — 71045 X-RAY EXAM CHEST 1 VIEW: CPT | Mod: 26 | Performed by: RADIOLOGY

## 2023-01-01 PROCEDURE — 85027 COMPLETE CBC AUTOMATED: CPT

## 2023-01-01 PROCEDURE — 37200 TRANSCATHETER BIOPSY: CPT | Performed by: STUDENT IN AN ORGANIZED HEALTH CARE EDUCATION/TRAINING PROGRAM

## 2023-01-01 PROCEDURE — 36415 COLL VENOUS BLD VENIPUNCTURE: CPT | Performed by: STUDENT IN AN ORGANIZED HEALTH CARE EDUCATION/TRAINING PROGRAM

## 2023-01-01 PROCEDURE — 36592 COLLECT BLOOD FROM PICC: CPT | Performed by: STUDENT IN AN ORGANIZED HEALTH CARE EDUCATION/TRAINING PROGRAM

## 2023-01-01 PROCEDURE — 36011 PLACE CATHETER IN VEIN: CPT | Performed by: STUDENT IN AN ORGANIZED HEALTH CARE EDUCATION/TRAINING PROGRAM

## 2023-01-01 PROCEDURE — 250N000011 HC RX IP 250 OP 636: Performed by: STUDENT IN AN ORGANIZED HEALTH CARE EDUCATION/TRAINING PROGRAM

## 2023-01-01 PROCEDURE — 250N000011 HC RX IP 250 OP 636

## 2023-01-01 PROCEDURE — 250N000013 HC RX MED GY IP 250 OP 250 PS 637

## 2023-01-01 PROCEDURE — 84484 ASSAY OF TROPONIN QUANT: CPT | Performed by: STUDENT IN AN ORGANIZED HEALTH CARE EDUCATION/TRAINING PROGRAM

## 2023-01-01 PROCEDURE — 82805 BLOOD GASES W/O2 SATURATION: CPT | Performed by: NURSE PRACTITIONER

## 2023-01-01 PROCEDURE — 80048 BASIC METABOLIC PNL TOTAL CA: CPT | Performed by: PHYSICIAN ASSISTANT

## 2023-01-01 PROCEDURE — 85027 COMPLETE CBC AUTOMATED: CPT | Performed by: PHYSICIAN ASSISTANT

## 2023-01-01 PROCEDURE — 84133 ASSAY OF URINE POTASSIUM: CPT | Performed by: STUDENT IN AN ORGANIZED HEALTH CARE EDUCATION/TRAINING PROGRAM

## 2023-01-01 PROCEDURE — 36415 COLL VENOUS BLD VENIPUNCTURE: CPT | Performed by: PHYSICIAN ASSISTANT

## 2023-01-01 PROCEDURE — 3E043XZ INTRODUCTION OF VASOPRESSOR INTO CENTRAL VEIN, PERCUTANEOUS APPROACH: ICD-10-PCS | Performed by: INTERNAL MEDICINE

## 2023-01-01 PROCEDURE — 83735 ASSAY OF MAGNESIUM: CPT | Performed by: INTERNAL MEDICINE

## 2023-01-01 PROCEDURE — 250N000009 HC RX 250: Performed by: NURSE PRACTITIONER

## 2023-01-01 PROCEDURE — 36592 COLLECT BLOOD FROM PICC: CPT | Performed by: NURSE PRACTITIONER

## 2023-01-01 PROCEDURE — 97140 MANUAL THERAPY 1/> REGIONS: CPT | Mod: GO

## 2023-01-01 PROCEDURE — 82310 ASSAY OF CALCIUM: CPT

## 2023-01-01 PROCEDURE — G0463 HOSPITAL OUTPT CLINIC VISIT: HCPCS | Mod: 25 | Performed by: INTERNAL MEDICINE

## 2023-01-01 PROCEDURE — 999N000248 HC STATISTIC IV INSERT WITH US BY RN

## 2023-01-01 PROCEDURE — 214N000001 HC R&B CCU UMMC

## 2023-01-01 PROCEDURE — 83880 ASSAY OF NATRIURETIC PEPTIDE: CPT | Performed by: EMERGENCY MEDICINE

## 2023-01-01 PROCEDURE — 82248 BILIRUBIN DIRECT: CPT | Performed by: NURSE PRACTITIONER

## 2023-01-01 PROCEDURE — 83615 LACTATE (LD) (LDH) ENZYME: CPT

## 2023-01-01 PROCEDURE — 99239 HOSP IP/OBS DSCHRG MGMT >30: CPT | Mod: 25 | Performed by: INTERNAL MEDICINE

## 2023-01-01 PROCEDURE — 36592 COLLECT BLOOD FROM PICC: CPT | Performed by: INTERNAL MEDICINE

## 2023-01-01 PROCEDURE — 85014 HEMATOCRIT: CPT

## 2023-01-01 PROCEDURE — 82728 ASSAY OF FERRITIN: CPT | Performed by: STUDENT IN AN ORGANIZED HEALTH CARE EDUCATION/TRAINING PROGRAM

## 2023-01-01 PROCEDURE — 84999 UNLISTED CHEMISTRY PROCEDURE: CPT | Mod: 59 | Performed by: INTERNAL MEDICINE

## 2023-01-01 PROCEDURE — 97530 THERAPEUTIC ACTIVITIES: CPT | Mod: GP | Performed by: PHYSICAL THERAPIST

## 2023-01-01 PROCEDURE — 84484 ASSAY OF TROPONIN QUANT: CPT | Performed by: EMERGENCY MEDICINE

## 2023-01-01 PROCEDURE — 80048 BASIC METABOLIC PNL TOTAL CA: CPT | Performed by: NURSE PRACTITIONER

## 2023-01-01 PROCEDURE — 96375 TX/PRO/DX INJ NEW DRUG ADDON: CPT | Performed by: STUDENT IN AN ORGANIZED HEALTH CARE EDUCATION/TRAINING PROGRAM

## 2023-01-01 PROCEDURE — G0463 HOSPITAL OUTPT CLINIC VISIT: HCPCS | Mod: 25 | Performed by: PHYSICIAN ASSISTANT

## 2023-01-01 PROCEDURE — 97535 SELF CARE MNGMENT TRAINING: CPT | Mod: GO

## 2023-01-01 PROCEDURE — 80048 BASIC METABOLIC PNL TOTAL CA: CPT | Performed by: INTERNAL MEDICINE

## 2023-01-01 PROCEDURE — U0003 INFECTIOUS AGENT DETECTION BY NUCLEIC ACID (DNA OR RNA); SEVERE ACUTE RESPIRATORY SYNDROME CORONAVIRUS 2 (SARS-COV-2) (CORONAVIRUS DISEASE [COVID-19]), AMPLIFIED PROBE TECHNIQUE, MAKING USE OF HIGH THROUGHPUT TECHNOLOGIES AS DESCRIBED BY CMS-2020-01-R: HCPCS | Performed by: INTERNAL MEDICINE

## 2023-01-01 PROCEDURE — 250N000009 HC RX 250: Performed by: STUDENT IN AN ORGANIZED HEALTH CARE EDUCATION/TRAINING PROGRAM

## 2023-01-01 PROCEDURE — 99232 SBSQ HOSP IP/OBS MODERATE 35: CPT | Performed by: INTERNAL MEDICINE

## 2023-01-01 PROCEDURE — 250N000012 HC RX MED GY IP 250 OP 636 PS 637: Performed by: NURSE PRACTITIONER

## 2023-01-01 PROCEDURE — 93308 TTE F-UP OR LMTD: CPT | Mod: 26 | Performed by: INTERNAL MEDICINE

## 2023-01-01 PROCEDURE — G0463 HOSPITAL OUTPT CLINIC VISIT: HCPCS | Mod: 25

## 2023-01-01 PROCEDURE — 93750 INTERROGATION VAD IN PERSON: CPT | Performed by: INTERNAL MEDICINE

## 2023-01-01 PROCEDURE — 97110 THERAPEUTIC EXERCISES: CPT | Mod: GO

## 2023-01-01 PROCEDURE — 99152 MOD SED SAME PHYS/QHP 5/>YRS: CPT | Performed by: STUDENT IN AN ORGANIZED HEALTH CARE EDUCATION/TRAINING PROGRAM

## 2023-01-01 PROCEDURE — 85027 COMPLETE CBC AUTOMATED: CPT | Performed by: NURSE PRACTITIONER

## 2023-01-01 PROCEDURE — 0FB03ZX EXCISION OF LIVER, PERCUTANEOUS APPROACH, DIAGNOSTIC: ICD-10-PCS | Performed by: STUDENT IN AN ORGANIZED HEALTH CARE EDUCATION/TRAINING PROGRAM

## 2023-01-01 PROCEDURE — 93750 INTERROGATION VAD IN PERSON: CPT | Performed by: PHYSICIAN ASSISTANT

## 2023-01-01 PROCEDURE — 93750 INTERROGATION VAD IN PERSON: CPT | Performed by: NURSE PRACTITIONER

## 2023-01-01 PROCEDURE — 93750 INTERROGATION VAD IN PERSON: CPT

## 2023-01-01 PROCEDURE — 93451 RIGHT HEART CATH: CPT | Mod: 26 | Performed by: INTERNAL MEDICINE

## 2023-01-01 PROCEDURE — G1010 CDSM STANSON: HCPCS

## 2023-01-01 PROCEDURE — 82310 ASSAY OF CALCIUM: CPT | Performed by: PHYSICIAN ASSISTANT

## 2023-01-01 PROCEDURE — 80053 COMPREHEN METABOLIC PANEL: CPT | Performed by: EMERGENCY MEDICINE

## 2023-01-01 PROCEDURE — 99233 SBSQ HOSP IP/OBS HIGH 50: CPT | Mod: 25 | Performed by: INTERNAL MEDICINE

## 2023-01-01 PROCEDURE — 83615 LACTATE (LD) (LDH) ENZYME: CPT | Performed by: NURSE PRACTITIONER

## 2023-01-01 PROCEDURE — 85730 THROMBOPLASTIN TIME PARTIAL: CPT

## 2023-01-01 PROCEDURE — 82248 BILIRUBIN DIRECT: CPT | Performed by: PHYSICIAN ASSISTANT

## 2023-01-01 PROCEDURE — 999N000157 HC STATISTIC RCP TIME EA 10 MIN

## 2023-01-01 PROCEDURE — 120N000003 HC R&B IMCU UMMC

## 2023-01-01 PROCEDURE — 82374 ASSAY BLOOD CARBON DIOXIDE: CPT | Performed by: STUDENT IN AN ORGANIZED HEALTH CARE EDUCATION/TRAINING PROGRAM

## 2023-01-01 PROCEDURE — 93321 DOPPLER ECHO F-UP/LMTD STD: CPT | Mod: 26 | Performed by: STUDENT IN AN ORGANIZED HEALTH CARE EDUCATION/TRAINING PROGRAM

## 2023-01-01 PROCEDURE — 93010 ELECTROCARDIOGRAM REPORT: CPT | Mod: XU | Performed by: INTERNAL MEDICINE

## 2023-01-01 PROCEDURE — 83550 IRON BINDING TEST: CPT | Performed by: NURSE PRACTITIONER

## 2023-01-01 PROCEDURE — 85610 PROTHROMBIN TIME: CPT | Performed by: NURSE PRACTITIONER

## 2023-01-01 PROCEDURE — 250N000009 HC RX 250: Performed by: INTERNAL MEDICINE

## 2023-01-01 PROCEDURE — 97530 THERAPEUTIC ACTIVITIES: CPT | Mod: GO

## 2023-01-01 PROCEDURE — 93282 PRGRMG EVAL IMPLANTABLE DFB: CPT

## 2023-01-01 PROCEDURE — G0463 HOSPITAL OUTPT CLINIC VISIT: HCPCS | Performed by: INTERNAL MEDICINE

## 2023-01-01 PROCEDURE — 87506 IADNA-DNA/RNA PROBE TQ 6-11: CPT | Performed by: NURSE PRACTITIONER

## 2023-01-01 PROCEDURE — 83615 LACTATE (LD) (LDH) ENZYME: CPT | Performed by: PHYSICIAN ASSISTANT

## 2023-01-01 PROCEDURE — 85025 COMPLETE CBC W/AUTO DIFF WBC: CPT

## 2023-01-01 PROCEDURE — 99233 SBSQ HOSP IP/OBS HIGH 50: CPT | Mod: GC | Performed by: INTERNAL MEDICINE

## 2023-01-01 PROCEDURE — 120N000005 HC R&B MS OVERFLOW UMMC

## 2023-01-01 PROCEDURE — 999N000007 HC SITE CHECK

## 2023-01-01 PROCEDURE — 36415 COLL VENOUS BLD VENIPUNCTURE: CPT

## 2023-01-01 PROCEDURE — 250N000009 HC RX 250

## 2023-01-01 PROCEDURE — 250N000009 HC RX 250: Performed by: EMERGENCY MEDICINE

## 2023-01-01 PROCEDURE — 83615 LACTATE (LD) (LDH) ENZYME: CPT | Performed by: PATHOLOGY

## 2023-01-01 PROCEDURE — 93308 TTE F-UP OR LMTD: CPT

## 2023-01-01 PROCEDURE — 80048 BASIC METABOLIC PNL TOTAL CA: CPT

## 2023-01-01 PROCEDURE — 99214 OFFICE O/P EST MOD 30 MIN: CPT | Mod: 95

## 2023-01-01 PROCEDURE — 4A023N6 MEASUREMENT OF CARDIAC SAMPLING AND PRESSURE, RIGHT HEART, PERCUTANEOUS APPROACH: ICD-10-PCS | Performed by: INTERNAL MEDICINE

## 2023-01-01 PROCEDURE — 99233 SBSQ HOSP IP/OBS HIGH 50: CPT | Performed by: STUDENT IN AN ORGANIZED HEALTH CARE EDUCATION/TRAINING PROGRAM

## 2023-01-01 PROCEDURE — 93306 TTE W/DOPPLER COMPLETE: CPT

## 2023-01-01 PROCEDURE — 36415 COLL VENOUS BLD VENIPUNCTURE: CPT | Performed by: INTERNAL MEDICINE

## 2023-01-01 PROCEDURE — 99232 SBSQ HOSP IP/OBS MODERATE 35: CPT | Mod: 25 | Performed by: PHYSICIAN ASSISTANT

## 2023-01-01 PROCEDURE — 85018 HEMOGLOBIN: CPT

## 2023-01-01 PROCEDURE — 83735 ASSAY OF MAGNESIUM: CPT | Performed by: STUDENT IN AN ORGANIZED HEALTH CARE EDUCATION/TRAINING PROGRAM

## 2023-01-01 PROCEDURE — 87389 HIV-1 AG W/HIV-1&-2 AB AG IA: CPT | Performed by: PHYSICIAN ASSISTANT

## 2023-01-01 PROCEDURE — 85004 AUTOMATED DIFF WBC COUNT: CPT

## 2023-01-01 PROCEDURE — 999N000044 HC STATISTIC CVC DRESSING CHANGE

## 2023-01-01 PROCEDURE — 258N000003 HC RX IP 258 OP 636: Performed by: INTERNAL MEDICINE

## 2023-01-01 PROCEDURE — 97116 GAIT TRAINING THERAPY: CPT | Mod: GP

## 2023-01-01 PROCEDURE — 97530 THERAPEUTIC ACTIVITIES: CPT | Mod: GP

## 2023-01-01 PROCEDURE — 87147 CULTURE TYPE IMMUNOLOGIC: CPT | Performed by: INTERNAL MEDICINE

## 2023-01-01 PROCEDURE — 99233 SBSQ HOSP IP/OBS HIGH 50: CPT | Mod: 25 | Performed by: NURSE PRACTITIONER

## 2023-01-01 PROCEDURE — 93325 DOPPLER ECHO COLOR FLOW MAPG: CPT

## 2023-01-01 PROCEDURE — 83010 ASSAY OF HAPTOGLOBIN QUANT: CPT | Performed by: EMERGENCY MEDICINE

## 2023-01-01 PROCEDURE — 84100 ASSAY OF PHOSPHORUS: CPT

## 2023-01-01 PROCEDURE — 93010 ELECTROCARDIOGRAM REPORT: CPT | Performed by: EMERGENCY MEDICINE

## 2023-01-01 PROCEDURE — 82728 ASSAY OF FERRITIN: CPT | Performed by: NURSE PRACTITIONER

## 2023-01-01 PROCEDURE — 85027 COMPLETE CBC AUTOMATED: CPT | Performed by: STUDENT IN AN ORGANIZED HEALTH CARE EDUCATION/TRAINING PROGRAM

## 2023-01-01 PROCEDURE — 87040 BLOOD CULTURE FOR BACTERIA: CPT

## 2023-01-01 PROCEDURE — 272N000001 HC OR GENERAL SUPPLY STERILE: Performed by: INTERNAL MEDICINE

## 2023-01-01 PROCEDURE — 93451 RIGHT HEART CATH: CPT | Performed by: INTERNAL MEDICINE

## 2023-01-01 PROCEDURE — 86140 C-REACTIVE PROTEIN: CPT | Performed by: PHYSICIAN ASSISTANT

## 2023-01-01 PROCEDURE — 999N000065 XR CHEST PORT 1 VIEW

## 2023-01-01 PROCEDURE — 87070 CULTURE OTHR SPECIMN AEROBIC: CPT | Performed by: INTERNAL MEDICINE

## 2023-01-01 PROCEDURE — 80053 COMPREHEN METABOLIC PANEL: CPT | Performed by: PATHOLOGY

## 2023-01-01 PROCEDURE — 85014 HEMATOCRIT: CPT | Performed by: NURSE PRACTITIONER

## 2023-01-01 PROCEDURE — 250N000011 HC RX IP 250 OP 636: Performed by: PHYSICIAN ASSISTANT

## 2023-01-01 PROCEDURE — U0005 INFEC AGEN DETEC AMPLI PROBE: HCPCS | Performed by: STUDENT IN AN ORGANIZED HEALTH CARE EDUCATION/TRAINING PROGRAM

## 2023-01-01 PROCEDURE — 93005 ELECTROCARDIOGRAM TRACING: CPT

## 2023-01-01 PROCEDURE — 83615 LACTATE (LD) (LDH) ENZYME: CPT | Performed by: STUDENT IN AN ORGANIZED HEALTH CARE EDUCATION/TRAINING PROGRAM

## 2023-01-01 PROCEDURE — 99214 OFFICE O/P EST MOD 30 MIN: CPT | Performed by: INTERNAL MEDICINE

## 2023-01-01 PROCEDURE — 999N000127 HC STATISTIC PERIPHERAL IV START W US GUIDANCE

## 2023-01-01 PROCEDURE — 74018 RADEX ABDOMEN 1 VIEW: CPT | Mod: 26 | Performed by: RADIOLOGY

## 2023-01-01 PROCEDURE — 258N000003 HC RX IP 258 OP 636: Performed by: STUDENT IN AN ORGANIZED HEALTH CARE EDUCATION/TRAINING PROGRAM

## 2023-01-01 PROCEDURE — 97110 THERAPEUTIC EXERCISES: CPT | Mod: GP | Performed by: REHABILITATION PRACTITIONER

## 2023-01-01 PROCEDURE — 76705 ECHO EXAM OF ABDOMEN: CPT | Mod: 26 | Performed by: STUDENT IN AN ORGANIZED HEALTH CARE EDUCATION/TRAINING PROGRAM

## 2023-01-01 PROCEDURE — 84443 ASSAY THYROID STIM HORMONE: CPT | Performed by: STUDENT IN AN ORGANIZED HEALTH CARE EDUCATION/TRAINING PROGRAM

## 2023-01-01 PROCEDURE — 258N000003 HC RX IP 258 OP 636

## 2023-01-01 PROCEDURE — G0463 HOSPITAL OUTPT CLINIC VISIT: HCPCS | Mod: PN,GT

## 2023-01-01 PROCEDURE — 82550 ASSAY OF CK (CPK): CPT | Performed by: INTERNAL MEDICINE

## 2023-01-01 PROCEDURE — 84132 ASSAY OF SERUM POTASSIUM: CPT | Performed by: HOSPITALIST

## 2023-01-01 PROCEDURE — 36592 COLLECT BLOOD FROM PICC: CPT

## 2023-01-01 PROCEDURE — 93296 REM INTERROG EVL PM/IDS: CPT

## 2023-01-01 PROCEDURE — 36415 COLL VENOUS BLD VENIPUNCTURE: CPT | Performed by: NURSE PRACTITIONER

## 2023-01-01 PROCEDURE — 82310 ASSAY OF CALCIUM: CPT | Performed by: NURSE PRACTITIONER

## 2023-01-01 PROCEDURE — 99222 1ST HOSP IP/OBS MODERATE 55: CPT | Mod: GC | Performed by: INTERNAL MEDICINE

## 2023-01-01 PROCEDURE — 250N000011 HC RX IP 250 OP 636: Mod: JZ | Performed by: NURSE PRACTITIONER

## 2023-01-01 PROCEDURE — 99223 1ST HOSP IP/OBS HIGH 75: CPT | Mod: GC | Performed by: INTERNAL MEDICINE

## 2023-01-01 PROCEDURE — 93282 PRGRMG EVAL IMPLANTABLE DFB: CPT | Mod: 26 | Performed by: INTERNAL MEDICINE

## 2023-01-01 PROCEDURE — 94660 CPAP INITIATION&MGMT: CPT

## 2023-01-01 PROCEDURE — 82248 BILIRUBIN DIRECT: CPT

## 2023-01-01 PROCEDURE — 36592 COLLECT BLOOD FROM PICC: CPT | Performed by: HOSPITALIST

## 2023-01-01 PROCEDURE — 0DBL8ZZ EXCISION OF TRANSVERSE COLON, VIA NATURAL OR ARTIFICIAL OPENING ENDOSCOPIC: ICD-10-PCS | Performed by: INTERNAL MEDICINE

## 2023-01-01 PROCEDURE — 71045 X-RAY EXAM CHEST 1 VIEW: CPT

## 2023-01-01 PROCEDURE — 36415 COLL VENOUS BLD VENIPUNCTURE: CPT | Performed by: EMERGENCY MEDICINE

## 2023-01-01 PROCEDURE — 82977 ASSAY OF GGT: CPT

## 2023-01-01 PROCEDURE — 83735 ASSAY OF MAGNESIUM: CPT | Performed by: PATHOLOGY

## 2023-01-01 PROCEDURE — 36415 COLL VENOUS BLD VENIPUNCTURE: CPT | Performed by: PATHOLOGY

## 2023-01-01 PROCEDURE — C9803 HOPD COVID-19 SPEC COLLECT: HCPCS | Performed by: STUDENT IN AN ORGANIZED HEALTH CARE EDUCATION/TRAINING PROGRAM

## 2023-01-01 PROCEDURE — 84132 ASSAY OF SERUM POTASSIUM: CPT | Performed by: PHYSICIAN ASSISTANT

## 2023-01-01 PROCEDURE — 250N000011 HC RX IP 250 OP 636: Mod: JZ | Performed by: PHYSICIAN ASSISTANT

## 2023-01-01 PROCEDURE — 97165 OT EVAL LOW COMPLEX 30 MIN: CPT | Mod: GO | Performed by: OCCUPATIONAL THERAPIST

## 2023-01-01 PROCEDURE — 99442 PR PHYSICIAN TELEPHONE EVALUATION 11-20 MIN: CPT | Mod: 95 | Performed by: NURSE PRACTITIONER

## 2023-01-01 PROCEDURE — 97129 THER IVNTJ 1ST 15 MIN: CPT | Mod: GO

## 2023-01-01 PROCEDURE — 99285 EMERGENCY DEPT VISIT HI MDM: CPT | Mod: 25 | Performed by: STUDENT IN AN ORGANIZED HEALTH CARE EDUCATION/TRAINING PROGRAM

## 2023-01-01 PROCEDURE — F08H5YZ WOUND MANAGEMENT TREATMENT OF INTEGUMENTARY SYSTEM - WHOLE BODY USING OTHER EQUIPMENT: ICD-10-PCS | Performed by: INTERNAL MEDICINE

## 2023-01-01 PROCEDURE — 99285 EMERGENCY DEPT VISIT HI MDM: CPT | Mod: 25 | Performed by: EMERGENCY MEDICINE

## 2023-01-01 PROCEDURE — 85610 PROTHROMBIN TIME: CPT | Performed by: EMERGENCY MEDICINE

## 2023-01-01 PROCEDURE — 83615 LACTATE (LD) (LDH) ENZYME: CPT | Performed by: EMERGENCY MEDICINE

## 2023-01-01 PROCEDURE — 99223 1ST HOSP IP/OBS HIGH 75: CPT | Mod: FS | Performed by: PHYSICIAN ASSISTANT

## 2023-01-01 PROCEDURE — 99215 OFFICE O/P EST HI 40 MIN: CPT | Performed by: INTERNAL MEDICINE

## 2023-01-01 PROCEDURE — 250N000012 HC RX MED GY IP 250 OP 636 PS 637

## 2023-01-01 PROCEDURE — 71046 X-RAY EXAM CHEST 2 VIEWS: CPT

## 2023-01-01 PROCEDURE — 86140 C-REACTIVE PROTEIN: CPT

## 2023-01-01 PROCEDURE — 83615 LACTATE (LD) (LDH) ENZYME: CPT | Performed by: INTERNAL MEDICINE

## 2023-01-01 PROCEDURE — 93010 ELECTROCARDIOGRAM REPORT: CPT | Performed by: INTERNAL MEDICINE

## 2023-01-01 PROCEDURE — 85610 PROTHROMBIN TIME: CPT | Performed by: INTERNAL MEDICINE

## 2023-01-01 PROCEDURE — 97605 NEG PRS WND THER DME<=50SQCM: CPT

## 2023-01-01 PROCEDURE — 97530 THERAPEUTIC ACTIVITIES: CPT | Mod: GP | Performed by: REHABILITATION PRACTITIONER

## 2023-01-01 PROCEDURE — 99214 OFFICE O/P EST MOD 30 MIN: CPT | Mod: 95 | Performed by: STUDENT IN AN ORGANIZED HEALTH CARE EDUCATION/TRAINING PROGRAM

## 2023-01-01 PROCEDURE — 250N000011 HC RX IP 250 OP 636: Performed by: EMERGENCY MEDICINE

## 2023-01-01 PROCEDURE — 87324 CLOSTRIDIUM AG IA: CPT | Performed by: PHYSICIAN ASSISTANT

## 2023-01-01 PROCEDURE — 97112 NEUROMUSCULAR REEDUCATION: CPT | Mod: GP | Performed by: REHABILITATION PRACTITIONER

## 2023-01-01 PROCEDURE — 82310 ASSAY OF CALCIUM: CPT | Performed by: INTERNAL MEDICINE

## 2023-01-01 PROCEDURE — 85610 PROTHROMBIN TIME: CPT | Performed by: STUDENT IN AN ORGANIZED HEALTH CARE EDUCATION/TRAINING PROGRAM

## 2023-01-01 PROCEDURE — 84443 ASSAY THYROID STIM HORMONE: CPT | Performed by: NURSE PRACTITIONER

## 2023-01-01 PROCEDURE — 250N000011 HC RX IP 250 OP 636: Mod: JZ

## 2023-01-01 PROCEDURE — 95711 VEEG 2-12 HR UNMONITORED: CPT

## 2023-01-01 PROCEDURE — C9113 INJ PANTOPRAZOLE SODIUM, VIA: HCPCS | Performed by: STUDENT IN AN ORGANIZED HEALTH CARE EDUCATION/TRAINING PROGRAM

## 2023-01-01 PROCEDURE — 71250 CT THORAX DX C-: CPT | Mod: 26 | Performed by: RADIOLOGY

## 2023-01-01 PROCEDURE — C1894 INTRO/SHEATH, NON-LASER: HCPCS | Performed by: INTERNAL MEDICINE

## 2023-01-01 PROCEDURE — 250N000011 HC RX IP 250 OP 636: Mod: JZ | Performed by: INTERNAL MEDICINE

## 2023-01-01 PROCEDURE — 43239 EGD BIOPSY SINGLE/MULTIPLE: CPT | Performed by: INTERNAL MEDICINE

## 2023-01-01 PROCEDURE — 93010 ELECTROCARDIOGRAM REPORT: CPT | Mod: 76 | Performed by: INTERNAL MEDICINE

## 2023-01-01 PROCEDURE — 99233 SBSQ HOSP IP/OBS HIGH 50: CPT | Performed by: INTERNAL MEDICINE

## 2023-01-01 PROCEDURE — 99223 1ST HOSP IP/OBS HIGH 75: CPT | Performed by: STUDENT IN AN ORGANIZED HEALTH CARE EDUCATION/TRAINING PROGRAM

## 2023-01-01 PROCEDURE — 80048 BASIC METABOLIC PNL TOTAL CA: CPT | Performed by: STUDENT IN AN ORGANIZED HEALTH CARE EDUCATION/TRAINING PROGRAM

## 2023-01-01 PROCEDURE — 99284 EMERGENCY DEPT VISIT MOD MDM: CPT | Mod: 25 | Performed by: EMERGENCY MEDICINE

## 2023-01-01 PROCEDURE — 0DB98ZX EXCISION OF DUODENUM, VIA NATURAL OR ARTIFICIAL OPENING ENDOSCOPIC, DIAGNOSTIC: ICD-10-PCS | Performed by: INTERNAL MEDICINE

## 2023-01-01 PROCEDURE — 75970 VASCULAR BIOPSY: CPT | Mod: 26 | Performed by: STUDENT IN AN ORGANIZED HEALTH CARE EDUCATION/TRAINING PROGRAM

## 2023-01-01 PROCEDURE — 84134 ASSAY OF PREALBUMIN: CPT | Performed by: PHYSICIAN ASSISTANT

## 2023-01-01 PROCEDURE — 88313 SPECIAL STAINS GROUP 2: CPT | Mod: 26 | Performed by: STUDENT IN AN ORGANIZED HEALTH CARE EDUCATION/TRAINING PROGRAM

## 2023-01-01 PROCEDURE — 84439 ASSAY OF FREE THYROXINE: CPT | Performed by: NURSE PRACTITIONER

## 2023-01-01 PROCEDURE — 82810 BLOOD GASES O2 SAT ONLY: CPT

## 2023-01-01 PROCEDURE — 99223 1ST HOSP IP/OBS HIGH 75: CPT | Mod: AI | Performed by: INTERNAL MEDICINE

## 2023-01-01 PROCEDURE — 87340 HEPATITIS B SURFACE AG IA: CPT | Performed by: PHYSICIAN ASSISTANT

## 2023-01-01 PROCEDURE — 87070 CULTURE OTHR SPECIMN AEROBIC: CPT

## 2023-01-01 PROCEDURE — 82330 ASSAY OF CALCIUM: CPT

## 2023-01-01 PROCEDURE — 93308 TTE F-UP OR LMTD: CPT | Mod: 26 | Performed by: STUDENT IN AN ORGANIZED HEALTH CARE EDUCATION/TRAINING PROGRAM

## 2023-01-01 PROCEDURE — 99214 OFFICE O/P EST MOD 30 MIN: CPT | Mod: VID

## 2023-01-01 PROCEDURE — 85025 COMPLETE CBC W/AUTO DIFF WBC: CPT | Performed by: PATHOLOGY

## 2023-01-01 PROCEDURE — U0005 INFEC AGEN DETEC AMPLI PROBE: HCPCS

## 2023-01-01 PROCEDURE — 45382 COLONOSCOPY W/CONTROL BLEED: CPT | Performed by: INTERNAL MEDICINE

## 2023-01-01 PROCEDURE — 84550 ASSAY OF BLOOD/URIC ACID: CPT

## 2023-01-01 PROCEDURE — 87493 C DIFF AMPLIFIED PROBE: CPT | Performed by: PHYSICIAN ASSISTANT

## 2023-01-01 PROCEDURE — 84443 ASSAY THYROID STIM HORMONE: CPT | Performed by: PHYSICIAN ASSISTANT

## 2023-01-01 PROCEDURE — 83880 ASSAY OF NATRIURETIC PEPTIDE: CPT | Performed by: STUDENT IN AN ORGANIZED HEALTH CARE EDUCATION/TRAINING PROGRAM

## 2023-01-01 PROCEDURE — 86803 HEPATITIS C AB TEST: CPT | Performed by: PHYSICIAN ASSISTANT

## 2023-01-01 PROCEDURE — 0DBK8ZZ EXCISION OF ASCENDING COLON, VIA NATURAL OR ARTIFICIAL OPENING ENDOSCOPIC: ICD-10-PCS | Performed by: INTERNAL MEDICINE

## 2023-01-01 PROCEDURE — 86850 RBC ANTIBODY SCREEN: CPT | Performed by: EMERGENCY MEDICINE

## 2023-01-01 PROCEDURE — 83880 ASSAY OF NATRIURETIC PEPTIDE: CPT

## 2023-01-01 PROCEDURE — 85018 HEMOGLOBIN: CPT | Performed by: PHYSICIAN ASSISTANT

## 2023-01-01 PROCEDURE — 97110 THERAPEUTIC EXERCISES: CPT | Mod: GP | Performed by: PHYSICAL THERAPIST

## 2023-01-01 PROCEDURE — 258N000003 HC RX IP 258 OP 636: Performed by: PHYSICIAN ASSISTANT

## 2023-01-01 PROCEDURE — 84450 TRANSFERASE (AST) (SGOT): CPT | Performed by: NURSE PRACTITIONER

## 2023-01-01 PROCEDURE — 0DB78ZX EXCISION OF STOMACH, PYLORUS, VIA NATURAL OR ARTIFICIAL OPENING ENDOSCOPIC, DIAGNOSTIC: ICD-10-PCS | Performed by: INTERNAL MEDICINE

## 2023-01-01 PROCEDURE — 99207 EEG VIDEO 2-12 HRS UNMONITORED: CPT | Performed by: PSYCHIATRY & NEUROLOGY

## 2023-01-01 PROCEDURE — 84439 ASSAY OF FREE THYROXINE: CPT | Performed by: PHYSICIAN ASSISTANT

## 2023-01-01 PROCEDURE — 99223 1ST HOSP IP/OBS HIGH 75: CPT | Mod: 25 | Performed by: INTERNAL MEDICINE

## 2023-01-01 PROCEDURE — 85014 HEMATOCRIT: CPT | Performed by: STUDENT IN AN ORGANIZED HEALTH CARE EDUCATION/TRAINING PROGRAM

## 2023-01-01 PROCEDURE — 81001 URINALYSIS AUTO W/SCOPE: CPT

## 2023-01-01 PROCEDURE — 80061 LIPID PANEL: CPT | Performed by: NURSE PRACTITIONER

## 2023-01-01 PROCEDURE — 99231 SBSQ HOSP IP/OBS SF/LOW 25: CPT | Mod: GC | Performed by: INTERNAL MEDICINE

## 2023-01-01 PROCEDURE — 96374 THER/PROPH/DIAG INJ IV PUSH: CPT

## 2023-01-01 PROCEDURE — 83036 HEMOGLOBIN GLYCOSYLATED A1C: CPT

## 2023-01-01 PROCEDURE — 93306 TTE W/DOPPLER COMPLETE: CPT | Mod: 26 | Performed by: INTERNAL MEDICINE

## 2023-01-01 PROCEDURE — 80061 LIPID PANEL: CPT

## 2023-01-01 PROCEDURE — 83550 IRON BINDING TEST: CPT | Performed by: STUDENT IN AN ORGANIZED HEALTH CARE EDUCATION/TRAINING PROGRAM

## 2023-01-01 PROCEDURE — 97161 PT EVAL LOW COMPLEX 20 MIN: CPT | Mod: GP | Performed by: PHYSICAL THERAPIST

## 2023-01-01 PROCEDURE — G1010 CDSM STANSON: HCPCS | Performed by: RADIOLOGY

## 2023-01-01 PROCEDURE — 87040 BLOOD CULTURE FOR BACTERIA: CPT | Performed by: PHYSICIAN ASSISTANT

## 2023-01-01 PROCEDURE — 97112 NEUROMUSCULAR REEDUCATION: CPT | Mod: GP

## 2023-01-01 PROCEDURE — 99214 OFFICE O/P EST MOD 30 MIN: CPT | Mod: 25 | Performed by: INTERNAL MEDICINE

## 2023-01-01 PROCEDURE — 82550 ASSAY OF CK (CPK): CPT | Performed by: PHYSICIAN ASSISTANT

## 2023-01-01 PROCEDURE — 93005 ELECTROCARDIOGRAM TRACING: CPT | Performed by: EMERGENCY MEDICINE

## 2023-01-01 PROCEDURE — 99222 1ST HOSP IP/OBS MODERATE 55: CPT | Mod: 25 | Performed by: STUDENT IN AN ORGANIZED HEALTH CARE EDUCATION/TRAINING PROGRAM

## 2023-01-01 PROCEDURE — 99285 EMERGENCY DEPT VISIT HI MDM: CPT | Performed by: EMERGENCY MEDICINE

## 2023-01-01 PROCEDURE — 84550 ASSAY OF BLOOD/URIC ACID: CPT | Performed by: NURSE PRACTITIONER

## 2023-01-01 PROCEDURE — 87522 HEPATITIS C REVRS TRNSCRPJ: CPT | Performed by: PHYSICIAN ASSISTANT

## 2023-01-01 PROCEDURE — 93325 DOPPLER ECHO COLOR FLOW MAPG: CPT | Mod: 26 | Performed by: STUDENT IN AN ORGANIZED HEALTH CARE EDUCATION/TRAINING PROGRAM

## 2023-01-01 PROCEDURE — 93321 DOPPLER ECHO F-UP/LMTD STD: CPT | Mod: 26 | Performed by: INTERNAL MEDICINE

## 2023-01-01 PROCEDURE — 82607 VITAMIN B-12: CPT | Performed by: STUDENT IN AN ORGANIZED HEALTH CARE EDUCATION/TRAINING PROGRAM

## 2023-01-01 PROCEDURE — 80053 COMPREHEN METABOLIC PANEL: CPT | Performed by: PHYSICIAN ASSISTANT

## 2023-01-01 PROCEDURE — 84439 ASSAY OF FREE THYROXINE: CPT | Performed by: STUDENT IN AN ORGANIZED HEALTH CARE EDUCATION/TRAINING PROGRAM

## 2023-01-01 PROCEDURE — G1010 CDSM STANSON: HCPCS | Mod: GC | Performed by: RADIOLOGY

## 2023-01-01 PROCEDURE — 82550 ASSAY OF CK (CPK): CPT | Performed by: STUDENT IN AN ORGANIZED HEALTH CARE EDUCATION/TRAINING PROGRAM

## 2023-01-01 PROCEDURE — 37200 TRANSCATHETER BIOPSY: CPT

## 2023-01-01 PROCEDURE — 84155 ASSAY OF PROTEIN SERUM: CPT

## 2023-01-01 PROCEDURE — 81003 URINALYSIS AUTO W/O SCOPE: CPT | Performed by: STUDENT IN AN ORGANIZED HEALTH CARE EDUCATION/TRAINING PROGRAM

## 2023-01-01 PROCEDURE — 85025 COMPLETE CBC W/AUTO DIFF WBC: CPT | Performed by: EMERGENCY MEDICINE

## 2023-01-01 PROCEDURE — 82310 ASSAY OF CALCIUM: CPT | Performed by: STUDENT IN AN ORGANIZED HEALTH CARE EDUCATION/TRAINING PROGRAM

## 2023-01-01 PROCEDURE — 93005 ELECTROCARDIOGRAM TRACING: CPT | Performed by: STUDENT IN AN ORGANIZED HEALTH CARE EDUCATION/TRAINING PROGRAM

## 2023-01-01 PROCEDURE — 250N000009 HC RX 250: Performed by: PHYSICIAN ASSISTANT

## 2023-01-01 PROCEDURE — 93010 ELECTROCARDIOGRAM REPORT: CPT | Performed by: STUDENT IN AN ORGANIZED HEALTH CARE EDUCATION/TRAINING PROGRAM

## 2023-01-01 PROCEDURE — 82140 ASSAY OF AMMONIA: CPT | Performed by: PHYSICIAN ASSISTANT

## 2023-01-01 PROCEDURE — 45380 COLONOSCOPY AND BIOPSY: CPT | Performed by: INTERNAL MEDICINE

## 2023-01-01 PROCEDURE — 84155 ASSAY OF PROTEIN SERUM: CPT | Performed by: NURSE PRACTITIONER

## 2023-01-01 PROCEDURE — 71046 X-RAY EXAM CHEST 2 VIEWS: CPT | Mod: 26 | Performed by: RADIOLOGY

## 2023-01-01 PROCEDURE — 83605 ASSAY OF LACTIC ACID: CPT | Performed by: INTERNAL MEDICINE

## 2023-01-01 PROCEDURE — 99213 OFFICE O/P EST LOW 20 MIN: CPT | Mod: VID | Performed by: INTERNAL MEDICINE

## 2023-01-01 PROCEDURE — 83605 ASSAY OF LACTIC ACID: CPT | Performed by: NURSE PRACTITIONER

## 2023-01-01 PROCEDURE — 93325 DOPPLER ECHO COLOR FLOW MAPG: CPT | Mod: 26 | Performed by: INTERNAL MEDICINE

## 2023-01-01 PROCEDURE — 87635 SARS-COV-2 COVID-19 AMP PRB: CPT | Performed by: NURSE PRACTITIONER

## 2023-01-01 PROCEDURE — 74176 CT ABD & PELVIS W/O CONTRAST: CPT | Mod: 26 | Performed by: RADIOLOGY

## 2023-01-01 PROCEDURE — 80053 COMPREHEN METABOLIC PANEL: CPT

## 2023-01-01 PROCEDURE — 97116 GAIT TRAINING THERAPY: CPT | Mod: GP | Performed by: PHYSICAL THERAPIST

## 2023-01-01 PROCEDURE — 74018 RADEX ABDOMEN 1 VIEW: CPT

## 2023-01-01 PROCEDURE — 85014 HEMATOCRIT: CPT | Performed by: PHYSICIAN ASSISTANT

## 2023-01-01 PROCEDURE — 88307 TISSUE EXAM BY PATHOLOGIST: CPT | Mod: 26 | Performed by: STUDENT IN AN ORGANIZED HEALTH CARE EDUCATION/TRAINING PROGRAM

## 2023-01-01 PROCEDURE — U0003 INFECTIOUS AGENT DETECTION BY NUCLEIC ACID (DNA OR RNA); SEVERE ACUTE RESPIRATORY SYNDROME CORONAVIRUS 2 (SARS-COV-2) (CORONAVIRUS DISEASE [COVID-19]), AMPLIFIED PROBE TECHNIQUE, MAKING USE OF HIGH THROUGHPUT TECHNOLOGIES AS DESCRIBED BY CMS-2020-01-R: HCPCS | Performed by: STUDENT IN AN ORGANIZED HEALTH CARE EDUCATION/TRAINING PROGRAM

## 2023-01-01 PROCEDURE — 0DB68ZX EXCISION OF STOMACH, VIA NATURAL OR ARTIFICIAL OPENING ENDOSCOPIC, DIAGNOSTIC: ICD-10-PCS | Performed by: INTERNAL MEDICINE

## 2023-01-01 PROCEDURE — 85027 COMPLETE CBC AUTOMATED: CPT | Performed by: PATHOLOGY

## 2023-01-01 PROCEDURE — 272N000572 HC SHEATH CR9

## 2023-01-01 PROCEDURE — 83605 ASSAY OF LACTIC ACID: CPT | Performed by: EMERGENCY MEDICINE

## 2023-01-01 PROCEDURE — 36584 COMPL RPLCMT PICC RS&I: CPT

## 2023-01-01 PROCEDURE — 250N000013 HC RX MED GY IP 250 OP 250 PS 637: Performed by: HOSPITALIST

## 2023-01-01 PROCEDURE — 93321 DOPPLER ECHO F-UP/LMTD STD: CPT

## 2023-01-01 PROCEDURE — 81001 URINALYSIS AUTO W/SCOPE: CPT | Performed by: PHYSICIAN ASSISTANT

## 2023-01-01 PROCEDURE — C1769 GUIDE WIRE: HCPCS

## 2023-01-01 PROCEDURE — 97116 GAIT TRAINING THERAPY: CPT | Mod: GP | Performed by: REHABILITATION PRACTITIONER

## 2023-01-01 PROCEDURE — 93975 VASCULAR STUDY: CPT

## 2023-01-01 PROCEDURE — 76937 US GUIDE VASCULAR ACCESS: CPT | Mod: 26 | Performed by: STUDENT IN AN ORGANIZED HEALTH CARE EDUCATION/TRAINING PROGRAM

## 2023-01-01 PROCEDURE — 82610 CYSTATIN C: CPT | Performed by: PHYSICIAN ASSISTANT

## 2023-01-01 PROCEDURE — 74176 CT ABD & PELVIS W/O CONTRAST: CPT | Mod: 26 | Performed by: STUDENT IN AN ORGANIZED HEALTH CARE EDUCATION/TRAINING PROGRAM

## 2023-01-01 PROCEDURE — 36592 COLLECT BLOOD FROM PICC: CPT | Performed by: EMERGENCY MEDICINE

## 2023-01-01 PROCEDURE — 86708 HEPATITIS A ANTIBODY: CPT | Performed by: PHYSICIAN ASSISTANT

## 2023-01-01 PROCEDURE — 82746 ASSAY OF FOLIC ACID SERUM: CPT | Performed by: STUDENT IN AN ORGANIZED HEALTH CARE EDUCATION/TRAINING PROGRAM

## 2023-01-01 PROCEDURE — 87077 CULTURE AEROBIC IDENTIFY: CPT | Mod: 59 | Performed by: INTERNAL MEDICINE

## 2023-01-01 PROCEDURE — 86923 COMPATIBILITY TEST ELECTRIC: CPT | Performed by: STUDENT IN AN ORGANIZED HEALTH CARE EDUCATION/TRAINING PROGRAM

## 2023-01-01 PROCEDURE — 86140 C-REACTIVE PROTEIN: CPT | Performed by: INTERNAL MEDICINE

## 2023-01-01 PROCEDURE — 86704 HEP B CORE ANTIBODY TOTAL: CPT | Performed by: PHYSICIAN ASSISTANT

## 2023-01-01 PROCEDURE — C9803 HOPD COVID-19 SPEC COLLECT: HCPCS | Performed by: EMERGENCY MEDICINE

## 2023-01-01 PROCEDURE — 93295 DEV INTERROG REMOTE 1/2/MLT: CPT | Performed by: INTERNAL MEDICINE

## 2023-01-01 PROCEDURE — 272N000155 HC KIT CR15

## 2023-01-01 PROCEDURE — 83735 ASSAY OF MAGNESIUM: CPT | Performed by: EMERGENCY MEDICINE

## 2023-01-01 PROCEDURE — 83605 ASSAY OF LACTIC ACID: CPT | Performed by: PHYSICIAN ASSISTANT

## 2023-01-01 PROCEDURE — 88305 TISSUE EXAM BY PATHOLOGIST: CPT | Mod: 26 | Performed by: PATHOLOGY

## 2023-01-01 PROCEDURE — G1010 CDSM STANSON: HCPCS | Mod: GC | Performed by: STUDENT IN AN ORGANIZED HEALTH CARE EDUCATION/TRAINING PROGRAM

## 2023-01-01 PROCEDURE — 85384 FIBRINOGEN ACTIVITY: CPT | Performed by: EMERGENCY MEDICINE

## 2023-01-01 PROCEDURE — 83735 ASSAY OF MAGNESIUM: CPT | Performed by: PHYSICIAN ASSISTANT

## 2023-01-01 PROCEDURE — 87186 SC STD MICRODIL/AGAR DIL: CPT | Mod: 59 | Performed by: INTERNAL MEDICINE

## 2023-01-01 PROCEDURE — 0DBN8ZZ EXCISION OF SIGMOID COLON, VIA NATURAL OR ARTIFICIAL OPENING ENDOSCOPIC: ICD-10-PCS | Performed by: INTERNAL MEDICINE

## 2023-01-01 PROCEDURE — 99222 1ST HOSP IP/OBS MODERATE 55: CPT | Performed by: PHYSICIAN ASSISTANT

## 2023-01-01 PROCEDURE — 97165 OT EVAL LOW COMPLEX 30 MIN: CPT | Mod: GO

## 2023-01-01 PROCEDURE — C1751 CATH, INF, PER/CENT/MIDLINE: HCPCS | Performed by: INTERNAL MEDICINE

## 2023-01-01 PROCEDURE — 87075 CULTR BACTERIA EXCEPT BLOOD: CPT | Performed by: INTERNAL MEDICINE

## 2023-01-01 PROCEDURE — 83605 ASSAY OF LACTIC ACID: CPT | Performed by: STUDENT IN AN ORGANIZED HEALTH CARE EDUCATION/TRAINING PROGRAM

## 2023-01-01 PROCEDURE — G0463 HOSPITAL OUTPT CLINIC VISIT: HCPCS | Mod: PN,GT | Performed by: STUDENT IN AN ORGANIZED HEALTH CARE EDUCATION/TRAINING PROGRAM

## 2023-01-01 PROCEDURE — 99222 1ST HOSP IP/OBS MODERATE 55: CPT | Mod: 25 | Performed by: INTERNAL MEDICINE

## 2023-01-01 PROCEDURE — 84450 TRANSFERASE (AST) (SGOT): CPT

## 2023-01-01 PROCEDURE — 95718 EEG PHYS/QHP 2-12 HR W/VEEG: CPT | Performed by: PSYCHIATRY & NEUROLOGY

## 2023-01-01 PROCEDURE — 82374 ASSAY BLOOD CARBON DIOXIDE: CPT | Performed by: PHYSICIAN ASSISTANT

## 2023-01-01 PROCEDURE — 999N000147 HC STATISTIC PT IP EVAL DEFER

## 2023-01-01 PROCEDURE — 99232 SBSQ HOSP IP/OBS MODERATE 35: CPT | Performed by: PHYSICIAN ASSISTANT

## 2023-01-01 PROCEDURE — 88305 TISSUE EXAM BY PATHOLOGIST: CPT | Mod: TC | Performed by: INTERNAL MEDICINE

## 2023-01-01 PROCEDURE — 84244 ASSAY OF RENIN: CPT | Performed by: STUDENT IN AN ORGANIZED HEALTH CARE EDUCATION/TRAINING PROGRAM

## 2023-01-01 PROCEDURE — 99215 OFFICE O/P EST HI 40 MIN: CPT | Mod: VID

## 2023-01-01 PROCEDURE — 71250 CT THORAX DX C-: CPT | Mod: MG

## 2023-01-01 PROCEDURE — 82962 GLUCOSE BLOOD TEST: CPT

## 2023-01-01 PROCEDURE — 87205 SMEAR GRAM STAIN: CPT | Performed by: INTERNAL MEDICINE

## 2023-01-01 PROCEDURE — 93975 VASCULAR STUDY: CPT | Mod: 26 | Performed by: STUDENT IN AN ORGANIZED HEALTH CARE EDUCATION/TRAINING PROGRAM

## 2023-01-01 PROCEDURE — 255N000002 HC RX 255 OP 636: Performed by: INTERNAL MEDICINE

## 2023-01-01 PROCEDURE — 80053 COMPREHEN METABOLIC PANEL: CPT | Performed by: NURSE PRACTITIONER

## 2023-01-01 PROCEDURE — G0463 HOSPITAL OUTPT CLINIC VISIT: HCPCS

## 2023-01-01 PROCEDURE — 82040 ASSAY OF SERUM ALBUMIN: CPT | Performed by: PHYSICIAN ASSISTANT

## 2023-01-01 PROCEDURE — 76705 ECHO EXAM OF ABDOMEN: CPT

## 2023-01-01 PROCEDURE — 88313 SPECIAL STAINS GROUP 2: CPT | Mod: TC | Performed by: NURSE PRACTITIONER

## 2023-01-01 PROCEDURE — 99222 1ST HOSP IP/OBS MODERATE 55: CPT

## 2023-01-01 PROCEDURE — 85260 CLOT FACTOR X STUART-POWER: CPT | Performed by: NURSE PRACTITIONER

## 2023-01-01 PROCEDURE — 93282 PRGRMG EVAL IMPLANTABLE DFB: CPT | Performed by: INTERNAL MEDICINE

## 2023-01-01 PROCEDURE — 87070 CULTURE OTHR SPECIMN AEROBIC: CPT | Performed by: PHYSICIAN ASSISTANT

## 2023-01-01 PROCEDURE — G0500 MOD SEDAT ENDO SERVICE >5YRS: HCPCS | Performed by: INTERNAL MEDICINE

## 2023-01-01 PROCEDURE — 83605 ASSAY OF LACTIC ACID: CPT

## 2023-01-01 PROCEDURE — 272N000504 HC NEEDLE CR4

## 2023-01-01 PROCEDURE — 272N000473 HC KIT, VPS RHYTHM STYLET

## 2023-01-01 PROCEDURE — 99153 MOD SED SAME PHYS/QHP EA: CPT | Performed by: INTERNAL MEDICINE

## 2023-01-01 PROCEDURE — 272N000451 HC KIT SHRLOCK 5FR POWER PICC DOUBLE LUMEN

## 2023-01-01 PROCEDURE — 97161 PT EVAL LOW COMPLEX 20 MIN: CPT | Mod: GP

## 2023-01-01 PROCEDURE — 5A09357 ASSISTANCE WITH RESPIRATORY VENTILATION, LESS THAN 24 CONSECUTIVE HOURS, CONTINUOUS POSITIVE AIRWAY PRESSURE: ICD-10-PCS | Performed by: INTERNAL MEDICINE

## 2023-01-01 PROCEDURE — 99213 OFFICE O/P EST LOW 20 MIN: CPT | Mod: VID

## 2023-01-01 PROCEDURE — 96365 THER/PROPH/DIAG IV INF INIT: CPT | Performed by: STUDENT IN AN ORGANIZED HEALTH CARE EDUCATION/TRAINING PROGRAM

## 2023-01-01 PROCEDURE — P9016 RBC LEUKOCYTES REDUCED: HCPCS | Performed by: STUDENT IN AN ORGANIZED HEALTH CARE EDUCATION/TRAINING PROGRAM

## 2023-01-01 PROCEDURE — 82088 ASSAY OF ALDOSTERONE: CPT | Performed by: STUDENT IN AN ORGANIZED HEALTH CARE EDUCATION/TRAINING PROGRAM

## 2023-01-01 PROCEDURE — 82310 ASSAY OF CALCIUM: CPT | Performed by: EMERGENCY MEDICINE

## 2023-01-01 PROCEDURE — 99233 SBSQ HOSP IP/OBS HIGH 50: CPT | Mod: FS | Performed by: INTERNAL MEDICINE

## 2023-01-01 PROCEDURE — 84484 ASSAY OF TROPONIN QUANT: CPT

## 2023-01-01 RX ORDER — HYDROCHLOROTHIAZIDE 25 MG/1
100 TABLET ORAL
Status: DISCONTINUED | OUTPATIENT
Start: 2023-01-01 | End: 2023-01-01

## 2023-01-01 RX ORDER — HYDRALAZINE HYDROCHLORIDE 50 MG/1
50 TABLET, FILM COATED ORAL 3 TIMES DAILY
Qty: 90 TABLET | Refills: 0 | Status: ON HOLD | OUTPATIENT
Start: 2023-01-01 | End: 2023-01-01

## 2023-01-01 RX ORDER — PANTOPRAZOLE SODIUM 40 MG/1
40 TABLET, DELAYED RELEASE ORAL DAILY
Status: DISCONTINUED | OUTPATIENT
Start: 2023-01-01 | End: 2023-01-01 | Stop reason: HOSPADM

## 2023-01-01 RX ORDER — POTASSIUM CHLORIDE 750 MG/1
20 TABLET, EXTENDED RELEASE ORAL ONCE
Status: COMPLETED | OUTPATIENT
Start: 2023-01-01 | End: 2023-01-01

## 2023-01-01 RX ORDER — WARFARIN SODIUM 4 MG/1
4 TABLET ORAL
Status: COMPLETED | OUTPATIENT
Start: 2023-01-01 | End: 2023-01-01

## 2023-01-01 RX ORDER — HYDROCHLOROTHIAZIDE 50 MG/1
100 TABLET ORAL DAILY
Qty: 60 TABLET | Refills: 0 | Status: CANCELLED | OUTPATIENT
Start: 2023-01-01

## 2023-01-01 RX ORDER — DAPAGLIFLOZIN 5 MG/1
5 TABLET, FILM COATED ORAL DAILY
Status: DISCONTINUED | OUTPATIENT
Start: 2023-01-01 | End: 2023-01-01

## 2023-01-01 RX ORDER — BUMETANIDE 0.25 MG/ML
5 INJECTION INTRAMUSCULAR; INTRAVENOUS ONCE
Status: COMPLETED | OUTPATIENT
Start: 2023-01-01 | End: 2023-01-01

## 2023-01-01 RX ORDER — CEFEPIME HYDROCHLORIDE 1 G/1
1 INJECTION, POWDER, FOR SOLUTION INTRAMUSCULAR; INTRAVENOUS EVERY 24 HOURS
Status: DISCONTINUED | OUTPATIENT
Start: 2023-01-01 | End: 2023-01-01

## 2023-01-01 RX ORDER — MAGNESIUM OXIDE 400 MG/1
400 TABLET ORAL EVERY 4 HOURS
Status: COMPLETED | OUTPATIENT
Start: 2023-01-01 | End: 2023-01-01

## 2023-01-01 RX ORDER — WARFARIN SODIUM 3 MG/1
3 TABLET ORAL
Status: COMPLETED | OUTPATIENT
Start: 2023-01-01 | End: 2023-01-01

## 2023-01-01 RX ORDER — POTASSIUM CHLORIDE 29.8 MG/ML
20 INJECTION INTRAVENOUS
Status: DISCONTINUED | OUTPATIENT
Start: 2023-01-01 | End: 2023-01-01

## 2023-01-01 RX ORDER — CHLOROTHIAZIDE SODIUM 500 MG/1
500 INJECTION INTRAVENOUS ONCE
Status: COMPLETED | OUTPATIENT
Start: 2023-01-01 | End: 2023-01-01

## 2023-01-01 RX ORDER — WARFARIN SODIUM 4 MG/1
TABLET ORAL
Status: ON HOLD | COMMUNITY
Start: 2023-01-01 | End: 2023-01-01

## 2023-01-01 RX ORDER — CHLOROTHIAZIDE SODIUM 500 MG/1
1000 INJECTION INTRAVENOUS ONCE
Status: COMPLETED | OUTPATIENT
Start: 2023-01-01 | End: 2023-01-01

## 2023-01-01 RX ORDER — FINASTERIDE 5 MG/1
5 TABLET, FILM COATED ORAL DAILY
Status: DISCONTINUED | OUTPATIENT
Start: 2023-01-01 | End: 2023-01-01 | Stop reason: HOSPADM

## 2023-01-01 RX ORDER — ACETAMINOPHEN 325 MG/1
650 TABLET ORAL EVERY 4 HOURS PRN
Status: DISCONTINUED | OUTPATIENT
Start: 2023-01-01 | End: 2023-01-01 | Stop reason: HOSPADM

## 2023-01-01 RX ORDER — POTASSIUM CHLORIDE 1500 MG/1
80 TABLET, EXTENDED RELEASE ORAL 3 TIMES DAILY
Status: DISCONTINUED | OUTPATIENT
Start: 2023-01-01 | End: 2023-01-01

## 2023-01-01 RX ORDER — LEVOTHYROXINE SODIUM 88 UG/1
88 TABLET ORAL
Status: DISCONTINUED | OUTPATIENT
Start: 2023-01-01 | End: 2023-01-01

## 2023-01-01 RX ORDER — POTASSIUM CHLORIDE 750 MG/1
40 TABLET, EXTENDED RELEASE ORAL ONCE
Status: COMPLETED | OUTPATIENT
Start: 2023-01-01 | End: 2023-01-01

## 2023-01-01 RX ORDER — FERROUS SULFATE 325(65) MG
325 TABLET ORAL
Status: DISCONTINUED | OUTPATIENT
Start: 2023-01-01 | End: 2023-01-01

## 2023-01-01 RX ORDER — PAROXETINE 10 MG/1
20 TABLET, FILM COATED ORAL DAILY
Status: DISCONTINUED | OUTPATIENT
Start: 2023-01-01 | End: 2023-01-01 | Stop reason: HOSPADM

## 2023-01-01 RX ORDER — HYDROCHLOROTHIAZIDE 12.5 MG/1
75 TABLET ORAL
Qty: 90 TABLET | Refills: 0 | Status: ON HOLD | OUTPATIENT
Start: 2023-01-01 | End: 2023-01-01

## 2023-01-01 RX ORDER — HEPARIN SODIUM (PORCINE) LOCK FLUSH IV SOLN 100 UNIT/ML 100 UNIT/ML
5 SOLUTION INTRAVENOUS
Status: CANCELLED | OUTPATIENT
Start: 2023-01-01

## 2023-01-01 RX ORDER — NALOXONE HYDROCHLORIDE 0.4 MG/ML
0.2 INJECTION, SOLUTION INTRAMUSCULAR; INTRAVENOUS; SUBCUTANEOUS
Status: DISCONTINUED | OUTPATIENT
Start: 2023-01-01 | End: 2023-01-01 | Stop reason: HOSPADM

## 2023-01-01 RX ORDER — CALCIUM CARBONATE 500 MG/1
1000 TABLET, CHEWABLE ORAL 2 TIMES DAILY PRN
Status: DISCONTINUED | OUTPATIENT
Start: 2023-01-01 | End: 2023-01-01 | Stop reason: HOSPADM

## 2023-01-01 RX ORDER — VANCOMYCIN HYDROCHLORIDE 50 MG/ML
125 KIT ORAL 4 TIMES DAILY
Qty: 100 ML | Refills: 0 | Status: SHIPPED | OUTPATIENT
Start: 2023-01-01 | End: 2023-01-01

## 2023-01-01 RX ORDER — WARFARIN SODIUM 2.5 MG/1
2.5 TABLET ORAL
Status: DISCONTINUED | OUTPATIENT
Start: 2023-01-01 | End: 2023-01-01

## 2023-01-01 RX ORDER — WARFARIN SODIUM 1 MG/1
2 TABLET ORAL
Status: COMPLETED | OUTPATIENT
Start: 2023-01-01 | End: 2023-01-01

## 2023-01-01 RX ORDER — WARFARIN SODIUM 4 MG/1
4 TABLET ORAL
Status: DISCONTINUED | OUTPATIENT
Start: 2023-01-01 | End: 2023-01-01

## 2023-01-01 RX ORDER — FERROUS SULFATE 325(65) MG
325 TABLET ORAL
Status: DISCONTINUED | OUTPATIENT
Start: 2023-01-01 | End: 2023-01-01 | Stop reason: HOSPADM

## 2023-01-01 RX ORDER — HYDRALAZINE HYDROCHLORIDE 50 MG/1
50 TABLET, FILM COATED ORAL 3 TIMES DAILY
Status: DISCONTINUED | OUTPATIENT
Start: 2023-01-01 | End: 2023-01-01 | Stop reason: HOSPADM

## 2023-01-01 RX ORDER — TORSEMIDE 100 MG/1
100 TABLET ORAL 3 TIMES DAILY
Status: DISCONTINUED | OUTPATIENT
Start: 2023-01-01 | End: 2023-01-01 | Stop reason: HOSPADM

## 2023-01-01 RX ORDER — BUMETANIDE 0.25 MG/ML
5 INJECTION INTRAMUSCULAR; INTRAVENOUS
Status: DISCONTINUED | OUTPATIENT
Start: 2023-01-01 | End: 2023-01-01 | Stop reason: HOSPADM

## 2023-01-01 RX ORDER — HYDROXYZINE HYDROCHLORIDE 25 MG/1
25-50 TABLET, FILM COATED ORAL EVERY 6 HOURS PRN
Status: DISCONTINUED | OUTPATIENT
Start: 2023-01-01 | End: 2023-01-01 | Stop reason: HOSPADM

## 2023-01-01 RX ORDER — MAGNESIUM OXIDE 400 MG/1
400 TABLET ORAL EVERY 4 HOURS
Status: DISPENSED | OUTPATIENT
Start: 2023-01-01 | End: 2023-01-01

## 2023-01-01 RX ORDER — WARFARIN SODIUM 2 MG/1
2 TABLET ORAL
Status: DISCONTINUED | OUTPATIENT
Start: 2023-01-01 | End: 2023-01-01

## 2023-01-01 RX ORDER — POTASSIUM CHLORIDE 1500 MG/1
80 TABLET, EXTENDED RELEASE ORAL ONCE
Status: COMPLETED | OUTPATIENT
Start: 2023-01-01 | End: 2023-01-01

## 2023-01-01 RX ORDER — POTASSIUM CHLORIDE 29.8 MG/ML
20 INJECTION INTRAVENOUS ONCE
Status: COMPLETED | OUTPATIENT
Start: 2023-01-01 | End: 2023-01-01

## 2023-01-01 RX ORDER — AMIODARONE HYDROCHLORIDE 200 MG/1
200 TABLET ORAL DAILY
Status: DISCONTINUED | OUTPATIENT
Start: 2023-01-01 | End: 2023-01-01 | Stop reason: HOSPADM

## 2023-01-01 RX ORDER — AMLODIPINE BESYLATE 10 MG/1
10 TABLET ORAL DAILY
Status: DISCONTINUED | OUTPATIENT
Start: 2023-01-01 | End: 2023-01-01 | Stop reason: HOSPADM

## 2023-01-01 RX ORDER — NICOTINE POLACRILEX 4 MG
15-30 LOZENGE BUCCAL
Status: DISCONTINUED | OUTPATIENT
Start: 2023-01-01 | End: 2023-01-01 | Stop reason: HOSPADM

## 2023-01-01 RX ORDER — POTASSIUM CHLORIDE 1500 MG/1
60 TABLET, EXTENDED RELEASE ORAL 2 TIMES DAILY
Status: DISCONTINUED | OUTPATIENT
Start: 2023-01-01 | End: 2023-01-01

## 2023-01-01 RX ORDER — ACETAZOLAMIDE 500 MG/5ML
500 INJECTION, POWDER, LYOPHILIZED, FOR SOLUTION INTRAVENOUS 3 TIMES DAILY
Status: DISCONTINUED | OUTPATIENT
Start: 2023-01-01 | End: 2023-01-01 | Stop reason: HOSPADM

## 2023-01-01 RX ORDER — LEVOTHYROXINE SODIUM 100 UG/1
88 TABLET ORAL
Qty: 30 TABLET | Refills: 0 | Status: ON HOLD | OUTPATIENT
Start: 2023-01-01 | End: 2023-01-01

## 2023-01-01 RX ORDER — METOLAZONE 2.5 MG/1
2.5 TABLET ORAL ONCE
Status: COMPLETED | OUTPATIENT
Start: 2023-01-01 | End: 2023-01-01

## 2023-01-01 RX ORDER — CEFEPIME HYDROCHLORIDE 2 G/1
2 INJECTION, POWDER, FOR SOLUTION INTRAVENOUS EVERY 24 HOURS
Status: DISCONTINUED | OUTPATIENT
Start: 2023-01-01 | End: 2023-01-01

## 2023-01-01 RX ORDER — CEFEPIME HYDROCHLORIDE 2 G/1
2 INJECTION, POWDER, FOR SOLUTION INTRAVENOUS ONCE
Status: CANCELLED
Start: 2023-01-01 | End: 2023-01-01

## 2023-01-01 RX ORDER — PANTOPRAZOLE SODIUM 40 MG/1
40 TABLET, DELAYED RELEASE ORAL
Status: DISCONTINUED | OUTPATIENT
Start: 2023-01-01 | End: 2023-01-01 | Stop reason: HOSPADM

## 2023-01-01 RX ORDER — HYDRALAZINE HYDROCHLORIDE 25 MG/1
25 TABLET, FILM COATED ORAL EVERY 8 HOURS
Qty: 90 TABLET | Refills: 3 | Status: ON HOLD | OUTPATIENT
Start: 2023-01-01 | End: 2023-01-01

## 2023-01-01 RX ORDER — METHYLPREDNISOLONE SODIUM SUCCINATE 125 MG/2ML
125 INJECTION, POWDER, LYOPHILIZED, FOR SOLUTION INTRAMUSCULAR; INTRAVENOUS
Status: CANCELLED
Start: 2023-01-01

## 2023-01-01 RX ORDER — HYDROCHLOROTHIAZIDE 25 MG/1
100 TABLET ORAL DAILY
Status: ON HOLD | COMMUNITY
End: 2023-01-01

## 2023-01-01 RX ORDER — CHLOROTHIAZIDE SODIUM 500 MG/1
1000 INJECTION INTRAVENOUS DAILY
Status: DISCONTINUED | OUTPATIENT
Start: 2023-01-01 | End: 2023-01-01

## 2023-01-01 RX ORDER — CIPROFLOXACIN 750 MG/1
750 TABLET, FILM COATED ORAL 2 TIMES DAILY
Qty: 28 TABLET | Refills: 0 | Status: SHIPPED | OUTPATIENT
Start: 2023-01-01 | End: 2023-01-01

## 2023-01-01 RX ORDER — BUMETANIDE 0.25 MG/ML
4 INJECTION INTRAMUSCULAR; INTRAVENOUS ONCE
Status: COMPLETED | OUTPATIENT
Start: 2023-01-01 | End: 2023-01-01

## 2023-01-01 RX ORDER — POTASSIUM CHLORIDE 1500 MG/1
60 TABLET, EXTENDED RELEASE ORAL 3 TIMES DAILY
Status: DISCONTINUED | OUTPATIENT
Start: 2023-01-01 | End: 2023-01-01 | Stop reason: HOSPADM

## 2023-01-01 RX ORDER — AMOXICILLIN 250 MG
1 CAPSULE ORAL 2 TIMES DAILY PRN
Status: DISCONTINUED | OUTPATIENT
Start: 2023-01-01 | End: 2023-01-01 | Stop reason: HOSPADM

## 2023-01-01 RX ORDER — MAGNESIUM SULFATE HEPTAHYDRATE 40 MG/ML
2 INJECTION, SOLUTION INTRAVENOUS ONCE
Status: COMPLETED | OUTPATIENT
Start: 2023-01-01 | End: 2023-01-01

## 2023-01-01 RX ORDER — DAPTOMYCIN 50 MG/ML
6 INJECTION, POWDER, LYOPHILIZED, FOR SOLUTION INTRAVENOUS ONCE
Status: CANCELLED | OUTPATIENT
Start: 2023-01-01 | End: 2023-01-01

## 2023-01-01 RX ORDER — DEXTROSE MONOHYDRATE 25 G/50ML
25-50 INJECTION, SOLUTION INTRAVENOUS
Status: DISCONTINUED | OUTPATIENT
Start: 2023-01-01 | End: 2023-01-01 | Stop reason: HOSPADM

## 2023-01-01 RX ORDER — ISOSORBIDE MONONITRATE 120 MG/1
120 TABLET, EXTENDED RELEASE ORAL DAILY
Qty: 90 TABLET | Refills: 0 | Status: ON HOLD | OUTPATIENT
Start: 2023-01-01 | End: 2023-01-01

## 2023-01-01 RX ORDER — ALBUTEROL SULFATE 0.83 MG/ML
2.5 SOLUTION RESPIRATORY (INHALATION)
Status: CANCELLED | OUTPATIENT
Start: 2023-01-01

## 2023-01-01 RX ORDER — FERROUS SULFATE 325(65) MG
325 TABLET ORAL
Qty: 90 TABLET | Refills: 3 | Status: SHIPPED | OUTPATIENT
Start: 2023-01-01 | End: 2023-01-01

## 2023-01-01 RX ORDER — POLYETHYLENE GLYCOL 3350 17 G/17G
17 POWDER, FOR SOLUTION ORAL 2 TIMES DAILY PRN
Qty: 510 G | Refills: 0 | Status: SHIPPED | OUTPATIENT
Start: 2023-01-01

## 2023-01-01 RX ORDER — HYDRALAZINE HYDROCHLORIDE 25 MG/1
25 TABLET, FILM COATED ORAL EVERY 8 HOURS
Status: DISCONTINUED | OUTPATIENT
Start: 2023-01-01 | End: 2023-01-01

## 2023-01-01 RX ORDER — TORSEMIDE 100 MG/1
100 TABLET ORAL
Status: DISCONTINUED | OUTPATIENT
Start: 2023-01-01 | End: 2023-01-01 | Stop reason: HOSPADM

## 2023-01-01 RX ORDER — HYDROMORPHONE HYDROCHLORIDE 1 MG/ML
1-2 SOLUTION ORAL EVERY 4 HOURS PRN
Qty: 180 ML | Refills: 0 | Status: SHIPPED | OUTPATIENT
Start: 2023-01-01

## 2023-01-01 RX ORDER — AMIODARONE HYDROCHLORIDE 200 MG/1
200 TABLET ORAL DAILY
Status: DISCONTINUED | OUTPATIENT
Start: 2023-01-01 | End: 2023-01-01

## 2023-01-01 RX ORDER — METHYLPREDNISOLONE SODIUM SUCCINATE 125 MG/2ML
125 INJECTION, POWDER, LYOPHILIZED, FOR SOLUTION INTRAMUSCULAR; INTRAVENOUS
Status: DISCONTINUED | OUTPATIENT
Start: 2023-01-01 | End: 2023-01-01 | Stop reason: HOSPADM

## 2023-01-01 RX ORDER — HEPARIN SODIUM,PORCINE 10 UNIT/ML
5 VIAL (ML) INTRAVENOUS
Status: CANCELLED | OUTPATIENT
Start: 2023-01-01

## 2023-01-01 RX ORDER — MAGNESIUM SULFATE HEPTAHYDRATE 40 MG/ML
4 INJECTION, SOLUTION INTRAVENOUS ONCE
Status: COMPLETED | OUTPATIENT
Start: 2023-01-01 | End: 2023-01-01

## 2023-01-01 RX ORDER — HYDRALAZINE HYDROCHLORIDE 100 MG/1
50 TABLET, FILM COATED ORAL 3 TIMES DAILY
Qty: 45 TABLET | Refills: 0 | Status: CANCELLED | OUTPATIENT
Start: 2023-01-01

## 2023-01-01 RX ORDER — ALLOPURINOL 100 MG/1
200 TABLET ORAL DAILY
Status: DISCONTINUED | OUTPATIENT
Start: 2023-01-01 | End: 2023-01-01 | Stop reason: HOSPADM

## 2023-01-01 RX ORDER — WARFARIN SODIUM 3 MG/1
3 TABLET ORAL
Status: DISCONTINUED | OUTPATIENT
Start: 2023-01-01 | End: 2023-01-01 | Stop reason: HOSPADM

## 2023-01-01 RX ORDER — WARFARIN SODIUM 1 MG/1
1 TABLET ORAL
Status: DISCONTINUED | OUTPATIENT
Start: 2023-01-01 | End: 2023-01-01

## 2023-01-01 RX ORDER — NALOXONE HYDROCHLORIDE 0.4 MG/ML
0.4 INJECTION, SOLUTION INTRAMUSCULAR; INTRAVENOUS; SUBCUTANEOUS
Status: DISCONTINUED | OUTPATIENT
Start: 2023-01-01 | End: 2023-01-01 | Stop reason: HOSPADM

## 2023-01-01 RX ORDER — TAMSULOSIN HYDROCHLORIDE 0.4 MG/1
0.8 CAPSULE ORAL DAILY
Status: DISCONTINUED | OUTPATIENT
Start: 2023-01-01 | End: 2023-01-01 | Stop reason: HOSPADM

## 2023-01-01 RX ORDER — DAPTOMYCIN 500 MG/10ML
6 INJECTION, POWDER, LYOPHILIZED, FOR SOLUTION INTRAVENOUS ONCE
Status: COMPLETED | OUTPATIENT
Start: 2023-01-01 | End: 2023-01-01

## 2023-01-01 RX ORDER — MAGNESIUM SULFATE HEPTAHYDRATE 40 MG/ML
4 INJECTION, SOLUTION INTRAVENOUS ONCE
Status: DISCONTINUED | OUTPATIENT
Start: 2023-01-01 | End: 2023-01-01

## 2023-01-01 RX ORDER — FENTANYL CITRATE 50 UG/ML
INJECTION, SOLUTION INTRAMUSCULAR; INTRAVENOUS PRN
Status: DISCONTINUED | OUTPATIENT
Start: 2023-01-01 | End: 2023-01-01

## 2023-01-01 RX ORDER — VANCOMYCIN HYDROCHLORIDE 50 MG/ML
125 KIT ORAL 4 TIMES DAILY
Status: DISCONTINUED | OUTPATIENT
Start: 2023-01-01 | End: 2023-01-01 | Stop reason: HOSPADM

## 2023-01-01 RX ORDER — TORSEMIDE 100 MG/1
100 TABLET ORAL 3 TIMES DAILY
Qty: 270 TABLET | Refills: 0 | Status: ON HOLD | OUTPATIENT
Start: 2023-01-01 | End: 2023-01-01

## 2023-01-01 RX ORDER — LORAZEPAM 0.5 MG/1
0.5 TABLET ORAL EVERY 8 HOURS PRN
Status: DISCONTINUED | OUTPATIENT
Start: 2023-01-01 | End: 2023-01-01 | Stop reason: HOSPADM

## 2023-01-01 RX ORDER — CEFEPIME HYDROCHLORIDE 2 G/1
2 INJECTION, POWDER, FOR SOLUTION INTRAVENOUS EVERY 24 HOURS
Status: DISCONTINUED | OUTPATIENT
Start: 2023-01-01 | End: 2023-01-01 | Stop reason: HOSPADM

## 2023-01-01 RX ORDER — CIPROFLOXACIN 500 MG/1
500 TABLET, FILM COATED ORAL 2 TIMES DAILY
Qty: 60 TABLET | Refills: 0 | Status: ON HOLD | OUTPATIENT
Start: 2023-01-01 | End: 2023-01-01

## 2023-01-01 RX ORDER — CIPROFLOXACIN 250 MG/1
750 TABLET, FILM COATED ORAL 2 TIMES DAILY
Status: DISCONTINUED | OUTPATIENT
Start: 2023-01-01 | End: 2023-01-01 | Stop reason: HOSPADM

## 2023-01-01 RX ORDER — DAPTOMYCIN 500 MG/10ML
6 INJECTION, POWDER, LYOPHILIZED, FOR SOLUTION INTRAVENOUS ONCE
Status: DISCONTINUED | OUTPATIENT
Start: 2023-01-01 | End: 2023-01-01

## 2023-01-01 RX ORDER — POTASSIUM CHLORIDE 750 MG/1
40 TABLET, EXTENDED RELEASE ORAL 2 TIMES DAILY
Status: DISCONTINUED | OUTPATIENT
Start: 2023-01-01 | End: 2023-01-01

## 2023-01-01 RX ORDER — QUETIAPINE FUMARATE 25 MG/1
25 TABLET, FILM COATED ORAL AT BEDTIME
Status: DISCONTINUED | OUTPATIENT
Start: 2023-01-01 | End: 2023-01-01

## 2023-01-01 RX ORDER — LEVOTHYROXINE SODIUM 88 UG/1
88 TABLET ORAL
Status: ON HOLD
Start: 2023-01-01 | End: 2023-01-01

## 2023-01-01 RX ORDER — CIPROFLOXACIN 500 MG/1
500 TABLET, FILM COATED ORAL 2 TIMES DAILY
Status: DISCONTINUED | OUTPATIENT
Start: 2023-01-01 | End: 2023-01-01

## 2023-01-01 RX ORDER — ASPIRIN 81 MG/1
81 TABLET ORAL DAILY
Status: DISCONTINUED | OUTPATIENT
Start: 2023-01-01 | End: 2023-01-01 | Stop reason: HOSPADM

## 2023-01-01 RX ORDER — POTASSIUM CHLORIDE 1500 MG/1
100 TABLET, EXTENDED RELEASE ORAL 3 TIMES DAILY
Status: DISCONTINUED | OUTPATIENT
Start: 2023-01-01 | End: 2023-01-01 | Stop reason: HOSPADM

## 2023-01-01 RX ORDER — LORAZEPAM 2 MG/ML
1 CONCENTRATE ORAL EVERY 6 HOURS PRN
Qty: 30 ML | Refills: 0 | Status: SHIPPED | OUTPATIENT
Start: 2023-01-01 | End: 2023-01-01

## 2023-01-01 RX ORDER — ZOLPIDEM TARTRATE 5 MG/1
5 TABLET ORAL
Status: DISCONTINUED | OUTPATIENT
Start: 2023-01-01 | End: 2023-01-01 | Stop reason: HOSPADM

## 2023-01-01 RX ORDER — VIT B COMP NO.3/FOLIC/C/BIOTIN 1 MG-60 MG
1 TABLET ORAL DAILY
Status: DISCONTINUED | OUTPATIENT
Start: 2023-01-01 | End: 2023-01-01 | Stop reason: HOSPADM

## 2023-01-01 RX ORDER — PANTOPRAZOLE SODIUM 40 MG/1
40 TABLET, DELAYED RELEASE ORAL
COMMUNITY
Start: 2023-01-01

## 2023-01-01 RX ORDER — WARFARIN SODIUM 3 MG/1
4.5 TABLET ORAL ONCE
Qty: 2 TABLET | Refills: 0 | Status: SHIPPED | OUTPATIENT
Start: 2023-01-01 | End: 2023-01-01

## 2023-01-01 RX ORDER — HYDRALAZINE HYDROCHLORIDE 25 MG/1
25 TABLET, FILM COATED ORAL EVERY 8 HOURS SCHEDULED
Status: DISCONTINUED | OUTPATIENT
Start: 2023-01-01 | End: 2023-01-01 | Stop reason: HOSPADM

## 2023-01-01 RX ORDER — CILOSTAZOL 100 MG/1
100 TABLET ORAL
Status: DISCONTINUED | OUTPATIENT
Start: 2023-01-01 | End: 2023-01-01

## 2023-01-01 RX ORDER — LANOLIN ALCOHOL/MO/W.PET/CERES
3 CREAM (GRAM) TOPICAL
Status: DISCONTINUED | OUTPATIENT
Start: 2023-01-01 | End: 2023-01-01 | Stop reason: HOSPADM

## 2023-01-01 RX ORDER — MAGNESIUM OXIDE 400 MG/1
400 TABLET ORAL 3 TIMES DAILY
Status: DISCONTINUED | OUTPATIENT
Start: 2023-01-01 | End: 2023-01-01 | Stop reason: HOSPADM

## 2023-01-01 RX ORDER — BUMETANIDE 0.25 MG/ML
4 INJECTION INTRAMUSCULAR; INTRAVENOUS EVERY 6 HOURS
Status: DISCONTINUED | OUTPATIENT
Start: 2023-01-01 | End: 2023-01-01

## 2023-01-01 RX ORDER — ACETAMINOPHEN 325 MG/1
650 TABLET ORAL EVERY 6 HOURS PRN
Status: DISCONTINUED | OUTPATIENT
Start: 2023-01-01 | End: 2023-01-01 | Stop reason: HOSPADM

## 2023-01-01 RX ORDER — AMIODARONE HYDROCHLORIDE 200 MG/1
TABLET ORAL
Qty: 90 TABLET | Refills: 3 | Status: ON HOLD | OUTPATIENT
Start: 2023-01-01 | End: 2023-01-01

## 2023-01-01 RX ORDER — CHLOROTHIAZIDE SODIUM 500 MG/1
1000 INJECTION INTRAVENOUS ONCE
Status: DISCONTINUED | OUTPATIENT
Start: 2023-01-01 | End: 2023-01-01

## 2023-01-01 RX ORDER — HYDROCHLOROTHIAZIDE 25 MG/1
TABLET ORAL
Qty: 118 TABLET | Refills: 3 | Status: ON HOLD | OUTPATIENT
Start: 2023-01-01 | End: 2023-01-01

## 2023-01-01 RX ORDER — LEVOTHYROXINE SODIUM 88 UG/1
88 TABLET ORAL
Status: DISCONTINUED | OUTPATIENT
Start: 2023-01-01 | End: 2023-01-01 | Stop reason: HOSPADM

## 2023-01-01 RX ORDER — CILOSTAZOL 100 MG/1
100 TABLET ORAL 2 TIMES DAILY
Status: DISCONTINUED | OUTPATIENT
Start: 2023-01-01 | End: 2023-01-01

## 2023-01-01 RX ORDER — POTASSIUM CHLORIDE 750 MG/1
40 TABLET, EXTENDED RELEASE ORAL 2 TIMES DAILY
Status: DISCONTINUED | OUTPATIENT
Start: 2023-01-01 | End: 2023-01-01 | Stop reason: HOSPADM

## 2023-01-01 RX ORDER — ACETAMINOPHEN 325 MG/1
975 TABLET ORAL EVERY 8 HOURS PRN
Status: DISCONTINUED | OUTPATIENT
Start: 2023-01-01 | End: 2023-01-01 | Stop reason: HOSPADM

## 2023-01-01 RX ORDER — CIPROFLOXACIN 500 MG/1
500 TABLET, FILM COATED ORAL 2 TIMES DAILY
Status: CANCELLED | OUTPATIENT
Start: 2023-01-01

## 2023-01-01 RX ORDER — DEXTROSE MONOHYDRATE 25 G/50ML
25-50 INJECTION, SOLUTION INTRAVENOUS
Status: DISCONTINUED | OUTPATIENT
Start: 2023-01-01 | End: 2023-01-01

## 2023-01-01 RX ORDER — LEVOTHYROXINE SODIUM 100 UG/1
100 TABLET ORAL
Status: DISCONTINUED | OUTPATIENT
Start: 2023-01-01 | End: 2023-01-01 | Stop reason: HOSPADM

## 2023-01-01 RX ORDER — POTASSIUM CHLORIDE 1500 MG/1
TABLET, EXTENDED RELEASE ORAL
Qty: 774 TABLET | Refills: 3 | Status: ON HOLD | COMMUNITY
Start: 2023-01-01 | End: 2023-01-01

## 2023-01-01 RX ORDER — POTASSIUM CHLORIDE 7.45 MG/ML
10 INJECTION INTRAVENOUS
Status: DISCONTINUED | OUTPATIENT
Start: 2023-01-01 | End: 2023-01-01

## 2023-01-01 RX ORDER — HYDROCHLOROTHIAZIDE 50 MG/1
100 TABLET ORAL DAILY
Qty: 180 TABLET | Refills: 3 | Status: SHIPPED | OUTPATIENT
Start: 2023-01-01 | End: 2023-01-01

## 2023-01-01 RX ORDER — WARFARIN SODIUM 2.5 MG/1
2.5 TABLET ORAL
Status: DISCONTINUED | OUTPATIENT
Start: 2023-01-01 | End: 2023-01-01 | Stop reason: HOSPADM

## 2023-01-01 RX ORDER — LIDOCAINE 40 MG/G
CREAM TOPICAL
Status: DISCONTINUED | OUTPATIENT
Start: 2023-01-01 | End: 2023-01-01

## 2023-01-01 RX ORDER — TORSEMIDE 100 MG/1
100 TABLET ORAL 3 TIMES DAILY
Qty: 90 TABLET | Refills: 0 | Status: SHIPPED | OUTPATIENT
Start: 2023-01-01 | End: 2023-01-01

## 2023-01-01 RX ORDER — POTASSIUM CHLORIDE 29.8 MG/ML
20 INJECTION INTRAVENOUS
Status: COMPLETED | OUTPATIENT
Start: 2023-01-01 | End: 2023-01-01

## 2023-01-01 RX ORDER — LACTOBACILLUS RHAMNOSUS GG 10B CELL
1 CAPSULE ORAL 2 TIMES DAILY
Status: DISCONTINUED | OUTPATIENT
Start: 2023-01-01 | End: 2023-01-01 | Stop reason: HOSPADM

## 2023-01-01 RX ORDER — WARFARIN SODIUM 5 MG/1
5 TABLET ORAL
Status: COMPLETED | OUTPATIENT
Start: 2023-01-01 | End: 2023-01-01

## 2023-01-01 RX ORDER — BUMETANIDE 0.25 MG/ML
2 INJECTION INTRAMUSCULAR; INTRAVENOUS ONCE
Status: COMPLETED | OUTPATIENT
Start: 2023-01-01 | End: 2023-01-01

## 2023-01-01 RX ORDER — AMLODIPINE BESYLATE 5 MG/1
2 TABLET ORAL
Status: ON HOLD | COMMUNITY
Start: 2023-01-01 | End: 2023-01-01

## 2023-01-01 RX ORDER — SPIRONOLACTONE 25 MG/1
25 TABLET ORAL DAILY
Status: DISCONTINUED | OUTPATIENT
Start: 2023-01-01 | End: 2023-01-01 | Stop reason: HOSPADM

## 2023-01-01 RX ORDER — SPIRONOLACTONE 25 MG/1
25 TABLET ORAL DAILY
Qty: 90 TABLET | Refills: 0 | Status: ON HOLD | OUTPATIENT
Start: 2023-01-01 | End: 2023-01-01

## 2023-01-01 RX ORDER — QUETIAPINE FUMARATE 25 MG/1
12.5 TABLET, FILM COATED ORAL AT BEDTIME
Qty: 30 TABLET | Refills: 3 | Status: SHIPPED | OUTPATIENT
Start: 2023-01-01

## 2023-01-01 RX ORDER — BUMETANIDE 0.25 MG/ML
5 INJECTION INTRAMUSCULAR; INTRAVENOUS
Status: DISCONTINUED | OUTPATIENT
Start: 2023-01-01 | End: 2023-01-01

## 2023-01-01 RX ORDER — ACETAZOLAMIDE 500 MG/5ML
500 INJECTION, POWDER, LYOPHILIZED, FOR SOLUTION INTRAVENOUS ONCE
Status: DISCONTINUED | OUTPATIENT
Start: 2023-01-01 | End: 2023-01-01

## 2023-01-01 RX ORDER — ISOSORBIDE MONONITRATE 30 MG/1
30 TABLET, EXTENDED RELEASE ORAL ONCE
Status: COMPLETED | OUTPATIENT
Start: 2023-01-01 | End: 2023-01-01

## 2023-01-01 RX ORDER — POTASSIUM CHLORIDE 1500 MG/1
60 TABLET, EXTENDED RELEASE ORAL 2 TIMES DAILY
Qty: 540 TABLET | Refills: 0 | Status: ON HOLD | OUTPATIENT
Start: 2023-01-01 | End: 2023-01-01

## 2023-01-01 RX ORDER — AMLODIPINE BESYLATE 5 MG/1
TABLET ORAL
Qty: 180 TABLET | Refills: 3 | Status: ON HOLD | OUTPATIENT
Start: 2023-01-01 | End: 2023-01-01

## 2023-01-01 RX ORDER — POTASSIUM CHLORIDE 750 MG/1
20 TABLET, EXTENDED RELEASE ORAL ONCE
Status: DISCONTINUED | OUTPATIENT
Start: 2023-01-01 | End: 2023-01-01

## 2023-01-01 RX ORDER — HYDROCHLOROTHIAZIDE 25 MG/1
75 TABLET ORAL DAILY
Status: DISCONTINUED | OUTPATIENT
Start: 2023-01-01 | End: 2023-01-01 | Stop reason: HOSPADM

## 2023-01-01 RX ORDER — MINERAL OIL/HYDROPHIL PETROLAT
OINTMENT (GRAM) TOPICAL 2 TIMES DAILY
Status: DISCONTINUED | OUTPATIENT
Start: 2023-01-01 | End: 2023-01-01 | Stop reason: HOSPADM

## 2023-01-01 RX ORDER — WARFARIN SODIUM 2 MG/1
2 TABLET ORAL
Status: COMPLETED | OUTPATIENT
Start: 2023-01-01 | End: 2023-01-01

## 2023-01-01 RX ORDER — POTASSIUM CHLORIDE 750 MG/1
40 TABLET, EXTENDED RELEASE ORAL 3 TIMES DAILY
Status: DISCONTINUED | OUTPATIENT
Start: 2023-01-01 | End: 2023-01-01

## 2023-01-01 RX ORDER — METHOCARBAMOL 500 MG/1
500 TABLET, FILM COATED ORAL 3 TIMES DAILY PRN
Status: DISCONTINUED | OUTPATIENT
Start: 2023-01-01 | End: 2023-01-01

## 2023-01-01 RX ORDER — HYDRALAZINE HYDROCHLORIDE 100 MG/1
100 TABLET, FILM COATED ORAL 3 TIMES DAILY
Status: DISCONTINUED | OUTPATIENT
Start: 2023-01-01 | End: 2023-01-01

## 2023-01-01 RX ORDER — POTASSIUM CHLORIDE 750 MG/1
10 TABLET, EXTENDED RELEASE ORAL ONCE
Status: COMPLETED | OUTPATIENT
Start: 2023-01-01 | End: 2023-01-01

## 2023-01-01 RX ORDER — POTASSIUM CHLORIDE 7.45 MG/ML
10 INJECTION INTRAVENOUS
Status: COMPLETED | OUTPATIENT
Start: 2023-01-01 | End: 2023-01-01

## 2023-01-01 RX ORDER — FLUORIDE TOOTHPASTE
5 TOOTHPASTE DENTAL 4 TIMES DAILY
Qty: 237 ML | Refills: 0 | Status: SHIPPED | OUTPATIENT
Start: 2023-01-01

## 2023-01-01 RX ORDER — HYDROCHLOROTHIAZIDE 25 MG/1
100 TABLET ORAL DAILY
Status: DISCONTINUED | OUTPATIENT
Start: 2023-01-01 | End: 2023-01-01 | Stop reason: HOSPADM

## 2023-01-01 RX ORDER — HYDROCHLOROTHIAZIDE 50 MG/1
100 TABLET ORAL DAILY
Qty: 60 TABLET | Refills: 3 | Status: SHIPPED | OUTPATIENT
Start: 2023-01-01 | End: 2023-01-01

## 2023-01-01 RX ORDER — METOLAZONE 2.5 MG/1
2.5 TABLET ORAL DAILY PRN
Qty: 5 TABLET | Refills: 1 | Status: SHIPPED | OUTPATIENT
Start: 2023-01-01

## 2023-01-01 RX ORDER — HYDROXYZINE HYDROCHLORIDE 25 MG/1
25-50 TABLET, FILM COATED ORAL EVERY 6 HOURS PRN
Qty: 120 TABLET | Refills: 0 | Status: SHIPPED | OUTPATIENT
Start: 2023-01-01

## 2023-01-01 RX ORDER — LEVOTHYROXINE SODIUM 100 UG/1
TABLET ORAL
Qty: 30 TABLET | Refills: 0 | OUTPATIENT
Start: 2023-01-01

## 2023-01-01 RX ORDER — HYDROCHLOROTHIAZIDE 50 MG/1
100 TABLET ORAL DAILY
Qty: 60 TABLET | Refills: 0 | Status: SHIPPED | OUTPATIENT
Start: 2023-01-01 | End: 2023-01-01

## 2023-01-01 RX ORDER — PAROXETINE 20 MG/1
20 TABLET, FILM COATED ORAL DAILY
Status: DISCONTINUED | OUTPATIENT
Start: 2023-01-01 | End: 2023-01-01

## 2023-01-01 RX ORDER — HYDROCHLOROTHIAZIDE 25 MG/1
75 TABLET ORAL DAILY
COMMUNITY
Start: 2023-01-01 | End: 2023-01-01

## 2023-01-01 RX ORDER — BUMETANIDE 0.25 MG/ML
6 INJECTION INTRAMUSCULAR; INTRAVENOUS ONCE
Status: COMPLETED | OUTPATIENT
Start: 2023-01-01 | End: 2023-01-01

## 2023-01-01 RX ORDER — POTASSIUM CHLORIDE 1500 MG/1
60 TABLET, EXTENDED RELEASE ORAL 3 TIMES DAILY
Status: DISCONTINUED | OUTPATIENT
Start: 2023-01-01 | End: 2023-01-01

## 2023-01-01 RX ORDER — CILOSTAZOL 50 MG/1
50 TABLET ORAL 2 TIMES DAILY
Status: DISCONTINUED | OUTPATIENT
Start: 2023-01-01 | End: 2023-01-01

## 2023-01-01 RX ORDER — POTASSIUM CHLORIDE 1500 MG/1
100 TABLET, EXTENDED RELEASE ORAL 3 TIMES DAILY
Status: DISCONTINUED | OUTPATIENT
Start: 2023-01-01 | End: 2023-01-01

## 2023-01-01 RX ORDER — NICOTINE POLACRILEX 4 MG
15-30 LOZENGE BUCCAL
Status: DISCONTINUED | OUTPATIENT
Start: 2023-01-01 | End: 2023-01-01

## 2023-01-01 RX ORDER — CIPROFLOXACIN 500 MG/1
500 TABLET, FILM COATED ORAL
Status: DISCONTINUED | OUTPATIENT
Start: 2023-01-01 | End: 2023-01-01 | Stop reason: HOSPADM

## 2023-01-01 RX ORDER — PAROXETINE 10 MG/1
20 TABLET, FILM COATED ORAL DAILY
Status: ON HOLD | COMMUNITY
End: 2023-01-01

## 2023-01-01 RX ORDER — PAROXETINE 20 MG/1
20 TABLET, FILM COATED ORAL DAILY
Status: DISCONTINUED | OUTPATIENT
Start: 2023-01-01 | End: 2023-01-01 | Stop reason: HOSPADM

## 2023-01-01 RX ORDER — LOPERAMIDE HCL 2 MG
2 CAPSULE ORAL 4 TIMES DAILY PRN
Status: DISCONTINUED | OUTPATIENT
Start: 2023-01-01 | End: 2023-01-01 | Stop reason: HOSPADM

## 2023-01-01 RX ORDER — ISOSORBIDE MONONITRATE 30 MG/1
120 TABLET, EXTENDED RELEASE ORAL DAILY
Qty: 360 TABLET | Refills: 3 | Status: ON HOLD | OUTPATIENT
Start: 2023-01-01 | End: 2023-01-01

## 2023-01-01 RX ORDER — LACTOBACILLUS RHAMNOSUS GG 10B CELL
1 CAPSULE ORAL 2 TIMES DAILY
Status: DISCONTINUED | OUTPATIENT
Start: 2023-01-01 | End: 2023-01-01

## 2023-01-01 RX ORDER — FLUORIDE TOOTHPASTE
5 TOOTHPASTE DENTAL 4 TIMES DAILY
Status: DISCONTINUED | OUTPATIENT
Start: 2023-01-01 | End: 2023-01-01 | Stop reason: HOSPADM

## 2023-01-01 RX ORDER — WARFARIN SODIUM 4 MG/1
4 TABLET ORAL
Status: CANCELLED | OUTPATIENT
Start: 2023-01-01 | End: 2023-01-01

## 2023-01-01 RX ORDER — MAGNESIUM OXIDE 400 MG/1
400 TABLET ORAL 2 TIMES DAILY
Status: DISCONTINUED | OUTPATIENT
Start: 2023-01-01 | End: 2023-01-01 | Stop reason: HOSPADM

## 2023-01-01 RX ORDER — QUETIAPINE FUMARATE 25 MG/1
25 TABLET, FILM COATED ORAL
Status: DISCONTINUED | OUTPATIENT
Start: 2023-01-01 | End: 2023-01-01

## 2023-01-01 RX ORDER — MEROPENEM 500 MG/1
500 INJECTION, POWDER, FOR SOLUTION INTRAVENOUS EVERY 12 HOURS
Status: DISCONTINUED | OUTPATIENT
Start: 2023-01-01 | End: 2023-01-01

## 2023-01-01 RX ORDER — MAGNESIUM OXIDE 400 MG/1
400 TABLET ORAL 3 TIMES DAILY
Status: DISCONTINUED | OUTPATIENT
Start: 2023-01-01 | End: 2023-01-01

## 2023-01-01 RX ORDER — L. ACIDOPHILUS/PECTIN, CITRUS 25MM-100MG
1 TABLET ORAL
Status: DISCONTINUED | OUTPATIENT
Start: 2023-01-01 | End: 2023-01-01

## 2023-01-01 RX ORDER — BUMETANIDE 0.25 MG/ML
5 INJECTION INTRAMUSCULAR; INTRAVENOUS ONCE
Status: DISCONTINUED | OUTPATIENT
Start: 2023-01-01 | End: 2023-01-01

## 2023-01-01 RX ORDER — POTASSIUM CHLORIDE 1.5 G/1.58G
40 POWDER, FOR SOLUTION ORAL ONCE
Status: COMPLETED | OUTPATIENT
Start: 2023-01-01 | End: 2023-01-01

## 2023-01-01 RX ORDER — HYDRALAZINE HYDROCHLORIDE 100 MG/1
100 TABLET, FILM COATED ORAL 3 TIMES DAILY
Status: DISCONTINUED | OUTPATIENT
Start: 2023-01-01 | End: 2023-01-01 | Stop reason: HOSPADM

## 2023-01-01 RX ORDER — POTASSIUM CHLORIDE 1500 MG/1
80 TABLET, EXTENDED RELEASE ORAL 2 TIMES DAILY
Qty: 540 TABLET | Refills: 0 | Status: SHIPPED | OUTPATIENT
Start: 2023-01-01 | End: 2023-01-01

## 2023-01-01 RX ORDER — POTASSIUM CHLORIDE 1.5 G/1.58G
20 POWDER, FOR SOLUTION ORAL ONCE
Status: COMPLETED | OUTPATIENT
Start: 2023-01-01 | End: 2023-01-01

## 2023-01-01 RX ORDER — DAPAGLIFLOZIN 10 MG/1
10 TABLET, FILM COATED ORAL DAILY
Status: DISCONTINUED | OUTPATIENT
Start: 2023-01-01 | End: 2023-01-01

## 2023-01-01 RX ORDER — DOBUTAMINE HYDROCHLORIDE 200 MG/100ML
2.5 INJECTION INTRAVENOUS CONTINUOUS
Qty: 1000 ML | Refills: 0 | Status: ON HOLD | OUTPATIENT
Start: 2023-01-01 | End: 2023-01-01

## 2023-01-01 RX ORDER — MAGNESIUM OXIDE 400 MG/1
400 TABLET ORAL 2 TIMES DAILY
Status: DISCONTINUED | OUTPATIENT
Start: 2023-01-01 | End: 2023-01-01

## 2023-01-01 RX ORDER — METHOCARBAMOL 500 MG/1
500 TABLET, FILM COATED ORAL 3 TIMES DAILY PRN
Qty: 90 TABLET | Refills: 1 | Status: ON HOLD | OUTPATIENT
Start: 2023-01-01 | End: 2023-01-01

## 2023-01-01 RX ORDER — WARFARIN SODIUM 1 MG/1
1 TABLET ORAL
Status: COMPLETED | OUTPATIENT
Start: 2023-01-01 | End: 2023-01-01

## 2023-01-01 RX ORDER — IODIXANOL 320 MG/ML
100 INJECTION, SOLUTION INTRAVASCULAR ONCE
Status: COMPLETED | OUTPATIENT
Start: 2023-01-01 | End: 2023-01-01

## 2023-01-01 RX ORDER — POTASSIUM CHLORIDE 1500 MG/1
80 TABLET, EXTENDED RELEASE ORAL 2 TIMES DAILY
Status: DISCONTINUED | OUTPATIENT
Start: 2023-01-01 | End: 2023-01-01

## 2023-01-01 RX ORDER — LORAZEPAM 0.5 MG/1
0.5 TABLET ORAL EVERY 8 HOURS
Status: DISCONTINUED | OUTPATIENT
Start: 2023-01-01 | End: 2023-01-01

## 2023-01-01 RX ORDER — BUMETANIDE 0.25 MG/ML
4 INJECTION INTRAMUSCULAR; INTRAVENOUS ONCE
Status: DISCONTINUED | OUTPATIENT
Start: 2023-01-01 | End: 2023-01-01

## 2023-01-01 RX ORDER — CALCIUM CARBONATE 500 MG/1
1000 TABLET, CHEWABLE ORAL 2 TIMES DAILY PRN
Status: DISCONTINUED | OUTPATIENT
Start: 2023-01-01 | End: 2023-01-01

## 2023-01-01 RX ORDER — DIPHENHYDRAMINE HYDROCHLORIDE 50 MG/ML
50 INJECTION INTRAMUSCULAR; INTRAVENOUS
Status: DISCONTINUED | OUTPATIENT
Start: 2023-01-01 | End: 2023-01-01 | Stop reason: HOSPADM

## 2023-01-01 RX ORDER — SPIRONOLACTONE 25 MG
12.5 TABLET ORAL DAILY
Status: DISCONTINUED | OUTPATIENT
Start: 2023-01-01 | End: 2023-01-01

## 2023-01-01 RX ORDER — ISOSORBIDE MONONITRATE 120 MG/1
120 TABLET, EXTENDED RELEASE ORAL DAILY
Qty: 90 TABLET | Refills: 0 | Status: SHIPPED | OUTPATIENT
Start: 2023-01-01 | End: 2023-01-01

## 2023-01-01 RX ORDER — ALBUTEROL SULFATE 90 UG/1
1-2 AEROSOL, METERED RESPIRATORY (INHALATION)
Status: CANCELLED
Start: 2023-01-01

## 2023-01-01 RX ORDER — DOPAMINE HYDROCHLORIDE 160 MG/100ML
2.5 INJECTION, SOLUTION INTRAVENOUS CONTINUOUS
Status: DISCONTINUED | OUTPATIENT
Start: 2023-01-01 | End: 2023-01-01

## 2023-01-01 RX ORDER — PAROXETINE 10 MG/1
10 TABLET, FILM COATED ORAL DAILY
Qty: 30 TABLET | Refills: 0 | Status: SHIPPED | OUTPATIENT
Start: 2023-01-01 | End: 2023-01-01

## 2023-01-01 RX ORDER — LEVOTHYROXINE SODIUM 100 UG/1
100 TABLET ORAL
Qty: 30 TABLET | Refills: 0 | Status: CANCELLED | OUTPATIENT
Start: 2023-01-01

## 2023-01-01 RX ORDER — POTASSIUM CHLORIDE 750 MG/1
40 TABLET, EXTENDED RELEASE ORAL ONCE
Status: DISCONTINUED | OUTPATIENT
Start: 2023-01-01 | End: 2023-01-01

## 2023-01-01 RX ORDER — CEFEPIME HYDROCHLORIDE 1 G/1
1 INJECTION, POWDER, FOR SOLUTION INTRAMUSCULAR; INTRAVENOUS EVERY 12 HOURS
Status: DISCONTINUED | OUTPATIENT
Start: 2023-01-01 | End: 2023-01-01

## 2023-01-01 RX ORDER — HYDROCHLOROTHIAZIDE 25 MG/1
75 TABLET ORAL
Status: DISCONTINUED | OUTPATIENT
Start: 2023-01-01 | End: 2023-01-01

## 2023-01-01 RX ORDER — CALCIUM CARBONATE 500 MG/1
500 TABLET, CHEWABLE ORAL DAILY PRN
Status: DISCONTINUED | OUTPATIENT
Start: 2023-01-01 | End: 2023-01-01 | Stop reason: HOSPADM

## 2023-01-01 RX ORDER — POLYETHYLENE GLYCOL 3350 17 G/17G
238 POWDER, FOR SOLUTION ORAL ONCE
Status: DISCONTINUED | OUTPATIENT
Start: 2023-01-01 | End: 2023-01-01

## 2023-01-01 RX ORDER — SPIRONOLACTONE 25 MG/1
25 TABLET ORAL DAILY
Status: DISCONTINUED | OUTPATIENT
Start: 2023-01-01 | End: 2023-01-01

## 2023-01-01 RX ORDER — ALLOPURINOL 100 MG/1
200 TABLET ORAL DAILY
Status: DISCONTINUED | OUTPATIENT
Start: 2023-01-01 | End: 2023-01-01

## 2023-01-01 RX ORDER — WARFARIN SODIUM 3 MG/1
6 TABLET ORAL
Status: COMPLETED | OUTPATIENT
Start: 2023-01-01 | End: 2023-01-01

## 2023-01-01 RX ORDER — ACETAZOLAMIDE 500 MG/5ML
500 INJECTION, POWDER, LYOPHILIZED, FOR SOLUTION INTRAVENOUS 2 TIMES DAILY
Status: DISCONTINUED | OUTPATIENT
Start: 2023-01-01 | End: 2023-01-01

## 2023-01-01 RX ORDER — POTASSIUM CHLORIDE 1500 MG/1
TABLET, EXTENDED RELEASE ORAL
Qty: 840 TABLET | Refills: 3 | Status: ON HOLD | OUTPATIENT
Start: 2023-01-01 | End: 2023-01-01

## 2023-01-01 RX ORDER — WARFARIN SODIUM 1 MG/1
1 TABLET ORAL DAILY
Qty: 30 TABLET | Refills: 0 | Status: SHIPPED | OUTPATIENT
Start: 2023-01-01

## 2023-01-01 RX ORDER — MEPERIDINE HYDROCHLORIDE 25 MG/ML
25 INJECTION INTRAMUSCULAR; INTRAVENOUS; SUBCUTANEOUS EVERY 30 MIN PRN
Status: CANCELLED | OUTPATIENT
Start: 2023-01-01

## 2023-01-01 RX ORDER — DOBUTAMINE HYDROCHLORIDE 200 MG/100ML
5 INJECTION INTRAVENOUS CONTINUOUS
Qty: 1000 ML | Refills: 0 | Status: SHIPPED | OUTPATIENT
Start: 2023-01-01

## 2023-01-01 RX ORDER — DAPTOMYCIN 50 MG/ML
500 INJECTION, POWDER, LYOPHILIZED, FOR SOLUTION INTRAVENOUS DAILY
Status: DISCONTINUED | OUTPATIENT
Start: 2023-01-01 | End: 2023-01-01

## 2023-01-01 RX ORDER — TAMSULOSIN HYDROCHLORIDE 0.4 MG/1
0.8 CAPSULE ORAL EVERY EVENING
Status: DISCONTINUED | OUTPATIENT
Start: 2023-01-01 | End: 2023-01-01 | Stop reason: HOSPADM

## 2023-01-01 RX ORDER — DAPTOMYCIN 50 MG/ML
500 INJECTION, POWDER, LYOPHILIZED, FOR SOLUTION INTRAVENOUS DAILY
Status: ON HOLD | COMMUNITY
End: 2023-01-01

## 2023-01-01 RX ORDER — BUMETANIDE 0.25 MG/ML
8 INJECTION INTRAMUSCULAR; INTRAVENOUS EVERY 6 HOURS
Status: DISCONTINUED | OUTPATIENT
Start: 2023-01-01 | End: 2023-01-01

## 2023-01-01 RX ORDER — CEFEPIME HYDROCHLORIDE 2 G/1
2 INJECTION, POWDER, FOR SOLUTION INTRAVENOUS EVERY 24 HOURS
Status: ON HOLD | COMMUNITY
Start: 2023-01-01 | End: 2023-01-01

## 2023-01-01 RX ORDER — SULFAMETHOXAZOLE AND TRIMETHOPRIM 400; 80 MG/1; MG/1
1 TABLET ORAL DAILY
Status: DISCONTINUED | OUTPATIENT
Start: 2023-01-01 | End: 2023-01-01 | Stop reason: HOSPADM

## 2023-01-01 RX ORDER — PANTOPRAZOLE SODIUM 40 MG/1
40 TABLET, DELAYED RELEASE ORAL
Qty: 60 TABLET | Refills: 3 | Status: ON HOLD | OUTPATIENT
Start: 2023-01-01 | End: 2023-01-01

## 2023-01-01 RX ORDER — POLYETHYLENE GLYCOL 3350 17 G/17G
17 POWDER, FOR SOLUTION ORAL 2 TIMES DAILY
Status: DISCONTINUED | OUTPATIENT
Start: 2023-01-01 | End: 2023-01-01 | Stop reason: HOSPADM

## 2023-01-01 RX ORDER — CEFDINIR 300 MG/1
300 CAPSULE ORAL DAILY
Qty: 30 CAPSULE | Refills: 0 | Status: SHIPPED | OUTPATIENT
Start: 2023-01-01

## 2023-01-01 RX ORDER — DIPHENHYDRAMINE HYDROCHLORIDE 50 MG/ML
50 INJECTION INTRAMUSCULAR; INTRAVENOUS
Status: CANCELLED
Start: 2023-01-01

## 2023-01-01 RX ORDER — HYDRALAZINE HYDROCHLORIDE 100 MG/1
125 TABLET, FILM COATED ORAL 3 TIMES DAILY
Status: CANCELLED | OUTPATIENT
Start: 2023-01-01

## 2023-01-01 RX ORDER — METHOCARBAMOL 500 MG/1
500 TABLET, FILM COATED ORAL 3 TIMES DAILY
Status: DISCONTINUED | OUTPATIENT
Start: 2023-01-01 | End: 2023-01-01 | Stop reason: HOSPADM

## 2023-01-01 RX ORDER — CALCIUM GLUCONATE 20 MG/ML
1 INJECTION, SOLUTION INTRAVENOUS ONCE
Status: COMPLETED | OUTPATIENT
Start: 2023-01-01 | End: 2023-01-01

## 2023-01-01 RX ORDER — METHYLPREDNISOLONE SODIUM SUCCINATE 125 MG/2ML
125 INJECTION, POWDER, LYOPHILIZED, FOR SOLUTION INTRAMUSCULAR; INTRAVENOUS
Status: DISCONTINUED | OUTPATIENT
Start: 2023-01-01 | End: 2023-01-01

## 2023-01-01 RX ORDER — POTASSIUM CHLORIDE 750 MG/1
40 TABLET, EXTENDED RELEASE ORAL 3 TIMES DAILY
Status: DISCONTINUED | OUTPATIENT
Start: 2023-01-01 | End: 2023-01-01 | Stop reason: HOSPADM

## 2023-01-01 RX ORDER — ISOSORBIDE MONONITRATE 30 MG/1
TABLET, EXTENDED RELEASE ORAL
Qty: 270 TABLET | Refills: 3 | OUTPATIENT
Start: 2023-01-01

## 2023-01-01 RX ORDER — PAROXETINE 10 MG/1
10 TABLET, FILM COATED ORAL DAILY
Status: DISCONTINUED | OUTPATIENT
Start: 2023-01-01 | End: 2023-01-01

## 2023-01-01 RX ORDER — FUROSEMIDE 10 MG/ML
60 INJECTION INTRAMUSCULAR; INTRAVENOUS ONCE
Status: COMPLETED | OUTPATIENT
Start: 2023-01-01 | End: 2023-01-01

## 2023-01-01 RX ORDER — POTASSIUM CHLORIDE 1500 MG/1
60 TABLET, EXTENDED RELEASE ORAL ONCE
Status: COMPLETED | OUTPATIENT
Start: 2023-01-01 | End: 2023-01-01

## 2023-01-01 RX ORDER — WARFARIN SODIUM 4 MG/1
4 TABLET ORAL ONCE
Status: COMPLETED | OUTPATIENT
Start: 2023-01-01 | End: 2023-01-01

## 2023-01-01 RX ORDER — HYDROCHLOROTHIAZIDE 25 MG/1
75 TABLET ORAL DAILY
Qty: 30 TABLET | Refills: 0 | Status: SHIPPED | OUTPATIENT
Start: 2023-01-01 | End: 2023-01-01

## 2023-01-01 RX ORDER — LEVOTHYROXINE SODIUM 75 UG/1
75 TABLET ORAL
Status: DISCONTINUED | OUTPATIENT
Start: 2023-01-01 | End: 2023-01-01

## 2023-01-01 RX ORDER — PAROXETINE 10 MG/1
10 TABLET, FILM COATED ORAL DAILY
Status: COMPLETED | OUTPATIENT
Start: 2023-01-01 | End: 2023-01-01

## 2023-01-01 RX ORDER — BUMETANIDE 2 MG/1
6 TABLET ORAL 3 TIMES DAILY
Status: DISCONTINUED | OUTPATIENT
Start: 2023-01-01 | End: 2023-01-01

## 2023-01-01 RX ORDER — TORSEMIDE 100 MG/1
100 TABLET ORAL 3 TIMES DAILY
Qty: 270 TABLET | Refills: 3 | Status: SHIPPED | OUTPATIENT
Start: 2023-01-01

## 2023-01-01 RX ORDER — METOLAZONE 2.5 MG/1
5 TABLET ORAL ONCE
Status: COMPLETED | OUTPATIENT
Start: 2023-01-01 | End: 2023-01-01

## 2023-01-01 RX ORDER — HYDRALAZINE HYDROCHLORIDE 100 MG/1
50 TABLET, FILM COATED ORAL 3 TIMES DAILY
Status: ON HOLD | COMMUNITY
End: 2023-01-01

## 2023-01-01 RX ORDER — POTASSIUM CHLORIDE 1500 MG/1
TABLET, EXTENDED RELEASE ORAL
Qty: 774 TABLET | Refills: 3 | Status: SHIPPED | OUTPATIENT
Start: 2023-01-01

## 2023-01-01 RX ORDER — CIPROFLOXACIN 500 MG/1
500 TABLET, FILM COATED ORAL
Qty: 30 TABLET | Refills: 0 | Status: CANCELLED | OUTPATIENT
Start: 2023-01-01

## 2023-01-01 RX ORDER — POTASSIUM CHLORIDE 1500 MG/1
60 TABLET, EXTENDED RELEASE ORAL 2 TIMES DAILY
Status: DISCONTINUED | OUTPATIENT
Start: 2023-01-01 | End: 2023-01-01 | Stop reason: HOSPADM

## 2023-01-01 RX ORDER — LOPERAMIDE HCL 2 MG
2 CAPSULE ORAL 4 TIMES DAILY PRN
Qty: 20 CAPSULE | Refills: 0 | Status: SHIPPED | OUTPATIENT
Start: 2023-01-01 | End: 2023-01-01

## 2023-01-01 RX ORDER — MEROPENEM 1 G/1
1000 INJECTION, POWDER, FOR SOLUTION INTRAVENOUS EVERY 24 HOURS
Status: DISCONTINUED | OUTPATIENT
Start: 2023-01-01 | End: 2023-01-01

## 2023-01-01 RX ORDER — BUMETANIDE 0.25 MG/ML
7 INJECTION INTRAMUSCULAR; INTRAVENOUS ONCE
Status: COMPLETED | OUTPATIENT
Start: 2023-01-01 | End: 2023-01-01

## 2023-01-01 RX ORDER — ISOSORBIDE MONONITRATE 60 MG/1
120 TABLET, EXTENDED RELEASE ORAL DAILY
Status: DISCONTINUED | OUTPATIENT
Start: 2023-01-01 | End: 2023-01-01 | Stop reason: HOSPADM

## 2023-01-01 RX ORDER — DOBUTAMINE HYDROCHLORIDE 200 MG/100ML
2.5 INJECTION INTRAVENOUS CONTINUOUS
Status: DISCONTINUED | OUTPATIENT
Start: 2023-01-01 | End: 2023-01-01 | Stop reason: HOSPADM

## 2023-01-01 RX ORDER — LORAZEPAM 0.5 MG/1
0.5 TABLET ORAL
Status: COMPLETED | OUTPATIENT
Start: 2023-01-01 | End: 2023-01-01

## 2023-01-01 RX ORDER — CIPROFLOXACIN 500 MG/1
500 TABLET, FILM COATED ORAL DAILY
Qty: 30 TABLET | Refills: 0 | Status: SHIPPED | OUTPATIENT
Start: 2023-01-01 | End: 2023-01-01 | Stop reason: ALTCHOICE

## 2023-01-01 RX ORDER — POTASSIUM CHLORIDE 1500 MG/1
20 TABLET, EXTENDED RELEASE ORAL ONCE
Status: COMPLETED | OUTPATIENT
Start: 2023-01-01 | End: 2023-01-01

## 2023-01-01 RX ORDER — CILOSTAZOL 50 MG/1
50 TABLET ORAL
Status: DISCONTINUED | OUTPATIENT
Start: 2023-01-01 | End: 2023-01-01

## 2023-01-01 RX ORDER — LOPERAMIDE HCL 2 MG
2 CAPSULE ORAL 4 TIMES DAILY PRN
Status: DISCONTINUED | OUTPATIENT
Start: 2023-01-01 | End: 2023-01-01

## 2023-01-01 RX ORDER — FLUMAZENIL 0.1 MG/ML
0.2 INJECTION, SOLUTION INTRAVENOUS
Status: DISCONTINUED | OUTPATIENT
Start: 2023-01-01 | End: 2023-01-01 | Stop reason: HOSPADM

## 2023-01-01 RX ORDER — MAGNESIUM OXIDE 400 MG/1
400 TABLET ORAL 3 TIMES DAILY
Qty: 90 TABLET | Refills: 3 | Status: ON HOLD | OUTPATIENT
Start: 2023-01-01 | End: 2023-01-01

## 2023-01-01 RX ORDER — WARFARIN SODIUM 4 MG/1
4 TABLET ORAL DAILY
Qty: 120 TABLET | Refills: 3 | Status: ON HOLD | OUTPATIENT
Start: 2023-01-01 | End: 2023-01-01

## 2023-01-01 RX ORDER — DAPTOMYCIN 50 MG/ML
500 INJECTION, POWDER, LYOPHILIZED, FOR SOLUTION INTRAVENOUS
Status: ACTIVE
Start: 2023-01-01 | End: 2023-01-01 | Stop reason: ALTCHOICE

## 2023-01-01 RX ORDER — HYDROCHLOROTHIAZIDE 50 MG/1
50 TABLET ORAL DAILY
Qty: 90 TABLET | Refills: 3 | Status: SHIPPED | OUTPATIENT
Start: 2023-01-01 | End: 2023-01-01

## 2023-01-01 RX ORDER — ALLOPURINOL 100 MG/1
200 TABLET ORAL DAILY
Qty: 60 TABLET | Refills: 1 | Status: ON HOLD | OUTPATIENT
Start: 2023-01-01 | End: 2023-01-01

## 2023-01-01 RX ORDER — POTASSIUM CHLORIDE 29.8 MG/ML
20 INJECTION INTRAVENOUS ONCE
Status: DISCONTINUED | OUTPATIENT
Start: 2023-01-01 | End: 2023-01-01

## 2023-01-01 RX ORDER — EPINEPHRINE 1 MG/ML
0.3 INJECTION, SOLUTION, CONCENTRATE INTRAVENOUS EVERY 5 MIN PRN
Status: CANCELLED | OUTPATIENT
Start: 2023-01-01

## 2023-01-01 RX ORDER — MAGNESIUM SULFATE 1 G/100ML
1 INJECTION INTRAVENOUS ONCE
Status: DISCONTINUED | OUTPATIENT
Start: 2023-01-01 | End: 2023-01-01

## 2023-01-01 RX ORDER — DAPTOMYCIN 50 MG/ML
6 INJECTION, POWDER, LYOPHILIZED, FOR SOLUTION INTRAVENOUS ONCE
Status: DISCONTINUED | OUTPATIENT
Start: 2023-01-01 | End: 2023-01-01

## 2023-01-01 RX ORDER — LORAZEPAM 0.5 MG/1
.5-1 TABLET ORAL EVERY 6 HOURS PRN
Qty: 50 TABLET | Refills: 0 | Status: SHIPPED | OUTPATIENT
Start: 2023-01-01

## 2023-01-01 RX ORDER — OXYMETAZOLINE HYDROCHLORIDE 0.05 G/100ML
3 SPRAY NASAL
Status: COMPLETED | OUTPATIENT
Start: 2023-01-01 | End: 2023-01-01

## 2023-01-01 RX ORDER — HYDROCHLOROTHIAZIDE 25 MG/1
TABLET ORAL
Qty: 118 TABLET | Refills: 3 | Status: ON HOLD
Start: 2023-01-01 | End: 2023-01-01

## 2023-01-01 RX ORDER — POTASSIUM CHLORIDE 1500 MG/1
TABLET, EXTENDED RELEASE ORAL
Qty: 774 TABLET | Refills: 3 | Status: SHIPPED | OUTPATIENT
Start: 2023-01-01 | End: 2023-01-01

## 2023-01-01 RX ORDER — VANCOMYCIN HYDROCHLORIDE 125 MG/1
125 CAPSULE ORAL 4 TIMES DAILY
Status: CANCELLED | OUTPATIENT
Start: 2023-01-01 | End: 2023-01-01

## 2023-01-01 RX ORDER — LIDOCAINE 40 MG/G
CREAM TOPICAL
Status: ACTIVE | OUTPATIENT
Start: 2023-01-01 | End: 2023-01-01

## 2023-01-01 RX ORDER — FENTANYL CITRATE 50 UG/ML
25-50 INJECTION, SOLUTION INTRAMUSCULAR; INTRAVENOUS EVERY 5 MIN PRN
Status: DISCONTINUED | OUTPATIENT
Start: 2023-01-01 | End: 2023-01-01 | Stop reason: HOSPADM

## 2023-01-01 RX ORDER — WARFARIN SODIUM 4 MG/1
4 TABLET ORAL
Status: DISCONTINUED | OUTPATIENT
Start: 2023-01-01 | End: 2023-01-01 | Stop reason: HOSPADM

## 2023-01-01 RX ORDER — ACETAMINOPHEN 325 MG/1
325 TABLET ORAL EVERY 4 HOURS PRN
Status: DISCONTINUED | OUTPATIENT
Start: 2023-01-01 | End: 2023-01-01 | Stop reason: HOSPADM

## 2023-01-01 RX ORDER — SULFAMETHOXAZOLE AND TRIMETHOPRIM 400; 80 MG/1; MG/1
1 TABLET ORAL DAILY
Qty: 30 TABLET | Refills: 1 | Status: CANCELLED | OUTPATIENT
Start: 2023-01-01

## 2023-01-01 RX ORDER — METHOCARBAMOL 500 MG/1
500 TABLET, FILM COATED ORAL 3 TIMES DAILY PRN
Status: DISCONTINUED | OUTPATIENT
Start: 2023-01-01 | End: 2023-01-01 | Stop reason: HOSPADM

## 2023-01-01 RX ORDER — BUMETANIDE 2 MG/1
6 TABLET ORAL 3 TIMES DAILY
Qty: 360 TABLET | Refills: 1 | Status: SHIPPED | OUTPATIENT
Start: 2023-01-01 | End: 2023-01-01

## 2023-01-01 RX ORDER — BUMETANIDE 0.25 MG/ML
5 INJECTION INTRAMUSCULAR; INTRAVENOUS
Qty: 20 ML | Refills: 3 | Status: SHIPPED | OUTPATIENT
Start: 2023-01-01 | End: 2023-01-01

## 2023-01-01 RX ORDER — BUMETANIDE 0.25 MG/ML
3 INJECTION INTRAMUSCULAR; INTRAVENOUS ONCE
Status: COMPLETED | OUTPATIENT
Start: 2023-01-01 | End: 2023-01-01

## 2023-01-01 RX ORDER — TORSEMIDE 100 MG/1
100 TABLET ORAL 3 TIMES DAILY
Status: DISCONTINUED | OUTPATIENT
Start: 2023-01-01 | End: 2023-01-01

## 2023-01-01 RX ORDER — MAGNESIUM OXIDE 400 MG/1
400 TABLET ORAL EVERY 4 HOURS
Status: DISCONTINUED | OUTPATIENT
Start: 2023-01-01 | End: 2023-01-01

## 2023-01-01 RX ORDER — CIPROFLOXACIN 750 MG/1
750 TABLET, FILM COATED ORAL 2 TIMES DAILY
Qty: 28 TABLET | Refills: 0 | Status: ON HOLD | OUTPATIENT
Start: 2023-01-01 | End: 2023-01-01

## 2023-01-01 RX ORDER — CEFEPIME HYDROCHLORIDE 2 G/1
1 INJECTION, POWDER, FOR SOLUTION INTRAVENOUS EVERY 24 HOURS
Status: ACTIVE | COMMUNITY
Start: 2023-01-01 | End: 2023-01-01

## 2023-01-01 RX ORDER — POTASSIUM CHLORIDE 1500 MG/1
80 TABLET, EXTENDED RELEASE ORAL 2 TIMES DAILY
Qty: 774 TABLET | Refills: 3 | Status: ON HOLD | OUTPATIENT
Start: 2023-01-01 | End: 2023-01-01

## 2023-01-01 RX ORDER — HYDRALAZINE HYDROCHLORIDE 100 MG/1
TABLET, FILM COATED ORAL
Qty: 270 TABLET | Refills: 1 | Status: ON HOLD | OUTPATIENT
Start: 2023-01-01 | End: 2023-01-01

## 2023-01-01 RX ORDER — TRAMADOL HYDROCHLORIDE 50 MG/1
50 TABLET ORAL EVERY 12 HOURS PRN
Status: DISCONTINUED | OUTPATIENT
Start: 2023-01-01 | End: 2023-01-01 | Stop reason: HOSPADM

## 2023-01-01 RX ORDER — METOLAZONE 2.5 MG/1
2.5 TABLET ORAL ONCE
Status: DISCONTINUED | OUTPATIENT
Start: 2023-01-01 | End: 2023-01-01

## 2023-01-01 RX ORDER — HYDROXYZINE HYDROCHLORIDE 25 MG/1
25 TABLET, FILM COATED ORAL EVERY 6 HOURS PRN
Status: DISCONTINUED | OUTPATIENT
Start: 2023-01-01 | End: 2023-01-01

## 2023-01-01 RX ORDER — SULFAMETHOXAZOLE/TRIMETHOPRIM 800-160 MG
1 TABLET ORAL DAILY
Qty: 21 TABLET | Refills: 0 | Status: ON HOLD | OUTPATIENT
Start: 2023-01-01 | End: 2023-01-01

## 2023-01-01 RX ORDER — FUROSEMIDE 10 MG/ML
60 INJECTION INTRAMUSCULAR; INTRAVENOUS ONCE
Status: DISCONTINUED | OUTPATIENT
Start: 2023-01-01 | End: 2023-01-01

## 2023-01-01 RX ORDER — DOBUTAMINE HYDROCHLORIDE 200 MG/100ML
5 INJECTION INTRAVENOUS CONTINUOUS
Status: DISCONTINUED | OUTPATIENT
Start: 2023-01-01 | End: 2023-01-01 | Stop reason: HOSPADM

## 2023-01-01 RX ORDER — HYDROCHLOROTHIAZIDE 25 MG/1
75 TABLET ORAL DAILY
Qty: 270 TABLET | Refills: 3 | Status: SHIPPED | OUTPATIENT
Start: 2023-01-01

## 2023-01-01 RX ORDER — POTASSIUM CHLORIDE 1500 MG/1
60 TABLET, EXTENDED RELEASE ORAL 3 TIMES DAILY
Qty: 774 TABLET | Refills: 3 | COMMUNITY
Start: 2023-01-01 | End: 2023-01-01

## 2023-01-01 RX ORDER — POTASSIUM CHLORIDE 7.45 MG/ML
10 INJECTION INTRAVENOUS
Status: DISCONTINUED | OUTPATIENT
Start: 2023-01-01 | End: 2023-01-01 | Stop reason: ALTCHOICE

## 2023-01-01 RX ORDER — VANCOMYCIN HYDROCHLORIDE 125 MG/1
CAPSULE ORAL
Qty: 30 CAPSULE | Refills: 0 | Status: SHIPPED | OUTPATIENT
Start: 2023-01-01 | End: 2023-01-01

## 2023-01-01 RX ORDER — DIPHENHYDRAMINE HYDROCHLORIDE 50 MG/ML
50 INJECTION INTRAMUSCULAR; INTRAVENOUS
Status: DISCONTINUED | OUTPATIENT
Start: 2023-01-01 | End: 2023-01-01

## 2023-01-01 RX ORDER — LIDOCAINE 40 MG/G
CREAM TOPICAL
Status: DISCONTINUED | OUTPATIENT
Start: 2023-01-01 | End: 2023-01-01 | Stop reason: HOSPADM

## 2023-01-01 RX ORDER — LIDOCAINE 40 MG/G
CREAM TOPICAL
Status: COMPLETED | OUTPATIENT
Start: 2023-01-01 | End: 2023-01-01

## 2023-01-01 RX ORDER — WARFARIN SODIUM 2.5 MG/1
2.5 TABLET ORAL
Status: COMPLETED | OUTPATIENT
Start: 2023-01-01 | End: 2023-01-01

## 2023-01-01 RX ADMIN — DICLOFENAC SODIUM 4 G: 10 GEL TOPICAL at 16:08

## 2023-01-01 RX ADMIN — HYDRALAZINE HYDROCHLORIDE 25 MG: 25 TABLET, FILM COATED ORAL at 05:58

## 2023-01-01 RX ADMIN — FERROUS SULFATE TAB 325 MG (65 MG ELEMENTAL FE) 325 MG: 325 (65 FE) TAB at 08:49

## 2023-01-01 RX ADMIN — LEVOTHYROXINE SODIUM 100 MCG: 100 TABLET ORAL at 08:54

## 2023-01-01 RX ADMIN — FERROUS SULFATE TAB 325 MG (65 MG ELEMENTAL FE) 325 MG: 325 (65 FE) TAB at 08:30

## 2023-01-01 RX ADMIN — CEFEPIME HYDROCHLORIDE 1 G: 1 INJECTION, POWDER, FOR SOLUTION INTRAMUSCULAR; INTRAVENOUS at 05:14

## 2023-01-01 RX ADMIN — TAMSULOSIN HYDROCHLORIDE 0.8 MG: 0.4 CAPSULE ORAL at 08:25

## 2023-01-01 RX ADMIN — PANTOPRAZOLE SODIUM 40 MG: 40 TABLET, DELAYED RELEASE ORAL at 08:24

## 2023-01-01 RX ADMIN — DAPTOMYCIN 500 MG: 500 INJECTION, POWDER, LYOPHILIZED, FOR SOLUTION INTRAVENOUS at 18:32

## 2023-01-01 RX ADMIN — TAMSULOSIN HYDROCHLORIDE 0.8 MG: 0.4 CAPSULE ORAL at 19:31

## 2023-01-01 RX ADMIN — MAGNESIUM OXIDE TAB 400 MG (241.3 MG ELEMENTAL MG) 400 MG: 400 (241.3 MG) TAB at 13:48

## 2023-01-01 RX ADMIN — HYDRALAZINE HYDROCHLORIDE 100 MG: 100 TABLET ORAL at 15:38

## 2023-01-01 RX ADMIN — POTASSIUM CHLORIDE 100 MEQ: 1500 TABLET, EXTENDED RELEASE ORAL at 20:33

## 2023-01-01 RX ADMIN — CILOSTAZOL 50 MG: 50 TABLET ORAL at 17:07

## 2023-01-01 RX ADMIN — MAGNESIUM OXIDE TAB 400 MG (241.3 MG ELEMENTAL MG) 400 MG: 400 (241.3 MG) TAB at 18:34

## 2023-01-01 RX ADMIN — AMIODARONE HYDROCHLORIDE 200 MG: 200 TABLET ORAL at 08:12

## 2023-01-01 RX ADMIN — CEFEPIME HYDROCHLORIDE 2 G: 2 INJECTION, POWDER, FOR SOLUTION INTRAVENOUS at 08:52

## 2023-01-01 RX ADMIN — TAMSULOSIN HYDROCHLORIDE 0.8 MG: 0.4 CAPSULE ORAL at 19:51

## 2023-01-01 RX ADMIN — BUMETANIDE 2 MG/HR: 0.25 INJECTION INTRAMUSCULAR; INTRAVENOUS at 02:31

## 2023-01-01 RX ADMIN — POTASSIUM CHLORIDE 40 MEQ: 750 TABLET, EXTENDED RELEASE ORAL at 19:31

## 2023-01-01 RX ADMIN — BUMETANIDE 2 MG/HR: 0.25 INJECTION INTRAMUSCULAR; INTRAVENOUS at 13:54

## 2023-01-01 RX ADMIN — BUMETANIDE 5 MG: 0.25 INJECTION, SOLUTION INTRAMUSCULAR; INTRAVENOUS at 15:03

## 2023-01-01 RX ADMIN — ZOLPIDEM TARTRATE 2.5 MG: 5 TABLET, FILM COATED ORAL at 22:04

## 2023-01-01 RX ADMIN — FINASTERIDE 5 MG: 5 TABLET, FILM COATED ORAL at 08:36

## 2023-01-01 RX ADMIN — POTASSIUM CHLORIDE 40 MEQ: 750 TABLET, EXTENDED RELEASE ORAL at 19:51

## 2023-01-01 RX ADMIN — MAGNESIUM OXIDE TAB 400 MG (241.3 MG ELEMENTAL MG) 400 MG: 400 (241.3 MG) TAB at 14:38

## 2023-01-01 RX ADMIN — POTASSIUM CHLORIDE 80 MEQ: 1500 TABLET, EXTENDED RELEASE ORAL at 20:38

## 2023-01-01 RX ADMIN — AMIODARONE HYDROCHLORIDE 200 MG: 200 TABLET ORAL at 07:52

## 2023-01-01 RX ADMIN — INSULIN ASPART 1 UNITS: 100 INJECTION, SOLUTION INTRAVENOUS; SUBCUTANEOUS at 12:14

## 2023-01-01 RX ADMIN — ZOLPIDEM TARTRATE 5 MG: 5 TABLET ORAL at 21:30

## 2023-01-01 RX ADMIN — POTASSIUM CHLORIDE 80 MEQ: 1500 TABLET, EXTENDED RELEASE ORAL at 08:11

## 2023-01-01 RX ADMIN — CALCIUM CARBONATE 1000 MG: 500 TABLET, CHEWABLE ORAL at 05:20

## 2023-01-01 RX ADMIN — SPIRONOLACTONE 12.5 MG: 25 TABLET, FILM COATED ORAL at 08:09

## 2023-01-01 RX ADMIN — ASPIRIN 81 MG: 81 TABLET ORAL at 08:17

## 2023-01-01 RX ADMIN — HYDRALAZINE HYDROCHLORIDE 50 MG: 50 TABLET, FILM COATED ORAL at 13:44

## 2023-01-01 RX ADMIN — PAROXETINE HYDROCHLORIDE 10 MG: 10 TABLET, FILM COATED ORAL at 08:49

## 2023-01-01 RX ADMIN — ACETAMINOPHEN 975 MG: 325 TABLET, FILM COATED ORAL at 17:07

## 2023-01-01 RX ADMIN — CHLOROTHIAZIDE SODIUM 1000 MG: 500 INJECTION, POWDER, LYOPHILIZED, FOR SOLUTION INTRAVENOUS at 07:32

## 2023-01-01 RX ADMIN — BUMETANIDE 2 MG/HR: 0.25 INJECTION INTRAMUSCULAR; INTRAVENOUS at 03:44

## 2023-01-01 RX ADMIN — AMLODIPINE BESYLATE 10 MG: 10 TABLET ORAL at 08:40

## 2023-01-01 RX ADMIN — FERROUS SULFATE TAB 325 MG (65 MG ELEMENTAL FE) 325 MG: 325 (65 FE) TAB at 08:24

## 2023-01-01 RX ADMIN — FINASTERIDE 5 MG: 5 TABLET, FILM COATED ORAL at 08:26

## 2023-01-01 RX ADMIN — FINASTERIDE 5 MG: 5 TABLET, FILM COATED ORAL at 08:24

## 2023-01-01 RX ADMIN — FINASTERIDE 5 MG: 5 TABLET, FILM COATED ORAL at 09:39

## 2023-01-01 RX ADMIN — BUMETANIDE 2 MG/HR: 0.25 INJECTION INTRAMUSCULAR; INTRAVENOUS at 23:11

## 2023-01-01 RX ADMIN — POTASSIUM CHLORIDE 60 MEQ: 1500 TABLET, EXTENDED RELEASE ORAL at 08:11

## 2023-01-01 RX ADMIN — DAPAGLIFLOZIN 5 MG: 5 TABLET, FILM COATED ORAL at 07:38

## 2023-01-01 RX ADMIN — Medication 2 MG/HR: at 23:18

## 2023-01-01 RX ADMIN — POTASSIUM CHLORIDE 10 MEQ: 7.46 INJECTION, SOLUTION INTRAVENOUS at 11:38

## 2023-01-01 RX ADMIN — ASPIRIN 81 MG: 81 TABLET ORAL at 08:12

## 2023-01-01 RX ADMIN — ASPIRIN 81 MG: 81 TABLET ORAL at 10:52

## 2023-01-01 RX ADMIN — HYDRALAZINE HYDROCHLORIDE 100 MG: 100 TABLET ORAL at 20:05

## 2023-01-01 RX ADMIN — POTASSIUM CHLORIDE 10 MEQ: 7.46 INJECTION, SOLUTION INTRAVENOUS at 13:45

## 2023-01-01 RX ADMIN — CHLOROTHIAZIDE SODIUM 1000 MG: 500 INJECTION, POWDER, LYOPHILIZED, FOR SOLUTION INTRAVENOUS at 08:29

## 2023-01-01 RX ADMIN — ISOSORBIDE MONONITRATE 90 MG: 60 TABLET, EXTENDED RELEASE ORAL at 08:23

## 2023-01-01 RX ADMIN — HYDROCHLOROTHIAZIDE 75 MG: 25 TABLET ORAL at 08:03

## 2023-01-01 RX ADMIN — FINASTERIDE 5 MG: 5 TABLET, FILM COATED ORAL at 08:04

## 2023-01-01 RX ADMIN — Medication 12.5 MG: at 21:13

## 2023-01-01 RX ADMIN — ZOLPIDEM TARTRATE 5 MG: 5 TABLET ORAL at 22:20

## 2023-01-01 RX ADMIN — CIPROFLOXACIN 750 MG: 250 TABLET, FILM COATED ORAL at 20:17

## 2023-01-01 RX ADMIN — AMLODIPINE BESYLATE 10 MG: 10 TABLET ORAL at 08:14

## 2023-01-01 RX ADMIN — TAMSULOSIN HYDROCHLORIDE 0.8 MG: 0.4 CAPSULE ORAL at 20:30

## 2023-01-01 RX ADMIN — DAPTOMYCIN 500 MG: 500 INJECTION, POWDER, LYOPHILIZED, FOR SOLUTION INTRAVENOUS at 16:56

## 2023-01-01 RX ADMIN — DAPTOMYCIN 500 MG: 500 INJECTION, POWDER, LYOPHILIZED, FOR SOLUTION INTRAVENOUS at 16:08

## 2023-01-01 RX ADMIN — PANTOPRAZOLE SODIUM 40 MG: 40 TABLET, DELAYED RELEASE ORAL at 08:13

## 2023-01-01 RX ADMIN — BUMETANIDE 5 MG: 0.25 INJECTION INTRAMUSCULAR; INTRAVENOUS at 00:01

## 2023-01-01 RX ADMIN — SPIRONOLACTONE 25 MG: 25 TABLET, FILM COATED ORAL at 07:47

## 2023-01-01 RX ADMIN — CEFEPIME HYDROCHLORIDE 1 G: 1 INJECTION, POWDER, FOR SOLUTION INTRAMUSCULAR; INTRAVENOUS at 04:50

## 2023-01-01 RX ADMIN — SULFAMETHOXAZOLE AND TRIMETHOPRIM 1 TABLET: 400; 80 TABLET ORAL at 08:07

## 2023-01-01 RX ADMIN — WARFARIN SODIUM 6 MG: 3 TABLET ORAL at 17:02

## 2023-01-01 RX ADMIN — MEROPENEM 1000 MG: 1 INJECTION, POWDER, FOR SOLUTION INTRAVENOUS at 20:16

## 2023-01-01 RX ADMIN — PANTOPRAZOLE SODIUM 40 MG: 40 TABLET, DELAYED RELEASE ORAL at 08:31

## 2023-01-01 RX ADMIN — CIPROFLOXACIN 750 MG: 250 TABLET, FILM COATED ORAL at 19:52

## 2023-01-01 RX ADMIN — CEFEPIME HYDROCHLORIDE 2 G: 2 INJECTION, POWDER, FOR SOLUTION INTRAVENOUS at 20:22

## 2023-01-01 RX ADMIN — Medication 1 CAPSULE: at 22:42

## 2023-01-01 RX ADMIN — POTASSIUM CHLORIDE 80 MEQ: 1500 TABLET, EXTENDED RELEASE ORAL at 08:51

## 2023-01-01 RX ADMIN — B-COMPLEX W/ C & FOLIC ACID TAB 1 MG 1 TABLET: 1 TAB at 08:13

## 2023-01-01 RX ADMIN — ALLOPURINOL 200 MG: 100 TABLET ORAL at 11:43

## 2023-01-01 RX ADMIN — PANTOPRAZOLE SODIUM 40 MG: 40 TABLET, DELAYED RELEASE ORAL at 16:08

## 2023-01-01 RX ADMIN — POTASSIUM CHLORIDE 20 MEQ: 29.8 INJECTION, SOLUTION INTRAVENOUS at 15:26

## 2023-01-01 RX ADMIN — POTASSIUM CHLORIDE 10 MEQ: 7.46 INJECTION, SOLUTION INTRAVENOUS at 11:28

## 2023-01-01 RX ADMIN — FINASTERIDE 5 MG: 5 TABLET, FILM COATED ORAL at 08:51

## 2023-01-01 RX ADMIN — POTASSIUM CHLORIDE 80 MEQ: 1500 TABLET, EXTENDED RELEASE ORAL at 08:26

## 2023-01-01 RX ADMIN — POTASSIUM CHLORIDE 80 MEQ: 1500 TABLET, EXTENDED RELEASE ORAL at 11:37

## 2023-01-01 RX ADMIN — METHOCARBAMOL 500 MG: 500 TABLET ORAL at 20:00

## 2023-01-01 RX ADMIN — BUMETANIDE 2 MG: 0.25 INJECTION INTRAMUSCULAR; INTRAVENOUS at 20:27

## 2023-01-01 RX ADMIN — DAPAGLIFLOZIN 10 MG: 10 TABLET, FILM COATED ORAL at 07:39

## 2023-01-01 RX ADMIN — BUMETANIDE 2 MG/HR: 0.25 INJECTION INTRAMUSCULAR; INTRAVENOUS at 21:13

## 2023-01-01 RX ADMIN — SPIRONOLACTONE 12.5 MG: 25 TABLET, FILM COATED ORAL at 08:13

## 2023-01-01 RX ADMIN — Medication 400 MG: at 07:52

## 2023-01-01 RX ADMIN — CHLOROTHIAZIDE SODIUM 1000 MG: 500 INJECTION, POWDER, LYOPHILIZED, FOR SOLUTION INTRAVENOUS at 07:44

## 2023-01-01 RX ADMIN — POTASSIUM CHLORIDE 40 MEQ: 750 TABLET, EXTENDED RELEASE ORAL at 08:18

## 2023-01-01 RX ADMIN — SPIRONOLACTONE 25 MG: 25 TABLET, FILM COATED ORAL at 08:25

## 2023-01-01 RX ADMIN — TORSEMIDE 100 MG: 100 TABLET ORAL at 14:56

## 2023-01-01 RX ADMIN — DAPTOMYCIN 500 MG: 500 INJECTION, POWDER, LYOPHILIZED, FOR SOLUTION INTRAVENOUS at 16:11

## 2023-01-01 RX ADMIN — POTASSIUM CHLORIDE 20 MEQ: 29.8 INJECTION, SOLUTION INTRAVENOUS at 08:14

## 2023-01-01 RX ADMIN — WHITE PETROLATUM: 1.75 OINTMENT TOPICAL at 20:28

## 2023-01-01 RX ADMIN — MAGNESIUM OXIDE TAB 400 MG (241.3 MG ELEMENTAL MG) 400 MG: 400 (241.3 MG) TAB at 02:40

## 2023-01-01 RX ADMIN — LEVOTHYROXINE SODIUM 88 MCG: 88 TABLET ORAL at 08:14

## 2023-01-01 RX ADMIN — ALLOPURINOL 200 MG: 100 TABLET ORAL at 08:04

## 2023-01-01 RX ADMIN — Medication 2 MG/HR: at 14:19

## 2023-01-01 RX ADMIN — ZOLPIDEM TARTRATE 5 MG: 5 TABLET ORAL at 20:47

## 2023-01-01 RX ADMIN — FINASTERIDE 5 MG: 5 TABLET, FILM COATED ORAL at 08:08

## 2023-01-01 RX ADMIN — ALLOPURINOL 200 MG: 100 TABLET ORAL at 07:34

## 2023-01-01 RX ADMIN — LEVOTHYROXINE SODIUM 100 MCG: 100 TABLET ORAL at 09:05

## 2023-01-01 RX ADMIN — DOBUTAMINE HYDROCHLORIDE 5 MCG/KG/MIN: 200 INJECTION INTRAVENOUS at 17:32

## 2023-01-01 RX ADMIN — WARFARIN SODIUM 2 MG: 1 TABLET ORAL at 17:32

## 2023-01-01 RX ADMIN — MAGNESIUM OXIDE TAB 400 MG (241.3 MG ELEMENTAL MG) 400 MG: 400 (241.3 MG) TAB at 17:40

## 2023-01-01 RX ADMIN — METHOCARBAMOL 500 MG: 500 TABLET ORAL at 14:45

## 2023-01-01 RX ADMIN — HYDRALAZINE HYDROCHLORIDE 100 MG: 100 TABLET, FILM COATED ORAL at 08:40

## 2023-01-01 RX ADMIN — Medication 12.5 MG: at 21:32

## 2023-01-01 RX ADMIN — WARFARIN SODIUM 3 MG: 3 TABLET ORAL at 17:22

## 2023-01-01 RX ADMIN — CEFEPIME HYDROCHLORIDE 1 G: 1 INJECTION, POWDER, FOR SOLUTION INTRAMUSCULAR; INTRAVENOUS at 20:32

## 2023-01-01 RX ADMIN — POTASSIUM CHLORIDE 40 MEQ: 750 TABLET, EXTENDED RELEASE ORAL at 20:18

## 2023-01-01 RX ADMIN — ACETAMINOPHEN 650 MG: 325 TABLET ORAL at 16:18

## 2023-01-01 RX ADMIN — TAMSULOSIN HYDROCHLORIDE 0.8 MG: 0.4 CAPSULE ORAL at 20:07

## 2023-01-01 RX ADMIN — Medication 12.5 MG: at 21:33

## 2023-01-01 RX ADMIN — DOBUTAMINE HYDROCHLORIDE 5 MCG/KG/MIN: 200 INJECTION INTRAVENOUS at 06:39

## 2023-01-01 RX ADMIN — DAPTOMYCIN 500 MG: 500 INJECTION, POWDER, LYOPHILIZED, FOR SOLUTION INTRAVENOUS at 10:34

## 2023-01-01 RX ADMIN — METHOCARBAMOL 500 MG: 500 TABLET ORAL at 14:13

## 2023-01-01 RX ADMIN — POTASSIUM CHLORIDE 100 MEQ: 1500 TABLET, EXTENDED RELEASE ORAL at 11:44

## 2023-01-01 RX ADMIN — PANTOPRAZOLE SODIUM 40 MG: 40 TABLET, DELAYED RELEASE ORAL at 18:10

## 2023-01-01 RX ADMIN — AMIODARONE HYDROCHLORIDE 200 MG: 200 TABLET ORAL at 08:40

## 2023-01-01 RX ADMIN — SPIRONOLACTONE 25 MG: 25 TABLET, FILM COATED ORAL at 09:20

## 2023-01-01 RX ADMIN — BUMETANIDE 2 MG/HR: 0.25 INJECTION INTRAMUSCULAR; INTRAVENOUS at 01:00

## 2023-01-01 RX ADMIN — POTASSIUM CHLORIDE 40 MEQ: 750 TABLET, EXTENDED RELEASE ORAL at 08:30

## 2023-01-01 RX ADMIN — METHOCARBAMOL 500 MG: 500 TABLET ORAL at 09:21

## 2023-01-01 RX ADMIN — ALLOPURINOL 200 MG: 100 TABLET ORAL at 08:26

## 2023-01-01 RX ADMIN — Medication 12.5 MG: at 21:31

## 2023-01-01 RX ADMIN — LEVOTHYROXINE SODIUM 100 MCG: 100 TABLET ORAL at 08:18

## 2023-01-01 RX ADMIN — BUMETANIDE 5 MG: 0.25 INJECTION INTRAMUSCULAR; INTRAVENOUS at 11:52

## 2023-01-01 RX ADMIN — BUMETANIDE 2 MG/HR: 0.25 INJECTION INTRAMUSCULAR; INTRAVENOUS at 13:09

## 2023-01-01 RX ADMIN — Medication 1 CAPSULE: at 21:41

## 2023-01-01 RX ADMIN — BUMETANIDE 2 MG/HR: 0.25 INJECTION INTRAMUSCULAR; INTRAVENOUS at 12:34

## 2023-01-01 RX ADMIN — WARFARIN SODIUM 3 MG: 3 TABLET ORAL at 18:33

## 2023-01-01 RX ADMIN — ISOSORBIDE MONONITRATE 120 MG: 60 TABLET, EXTENDED RELEASE ORAL at 08:26

## 2023-01-01 RX ADMIN — Medication 1 CAPSULE: at 11:43

## 2023-01-01 RX ADMIN — POTASSIUM CHLORIDE 10 MEQ: 7.46 INJECTION, SOLUTION INTRAVENOUS at 10:40

## 2023-01-01 RX ADMIN — HYDRALAZINE HYDROCHLORIDE 100 MG: 100 TABLET ORAL at 08:13

## 2023-01-01 RX ADMIN — PANTOPRAZOLE SODIUM 40 MG: 40 TABLET, DELAYED RELEASE ORAL at 09:20

## 2023-01-01 RX ADMIN — PANTOPRAZOLE SODIUM 40 MG: 40 TABLET, DELAYED RELEASE ORAL at 07:47

## 2023-01-01 RX ADMIN — HYDRALAZINE HYDROCHLORIDE 50 MG: 50 TABLET, FILM COATED ORAL at 19:51

## 2023-01-01 RX ADMIN — Medication 1 TABLET: at 07:34

## 2023-01-01 RX ADMIN — FINASTERIDE 5 MG: 5 TABLET, FILM COATED ORAL at 08:14

## 2023-01-01 RX ADMIN — AMLODIPINE BESYLATE 10 MG: 10 TABLET ORAL at 07:51

## 2023-01-01 RX ADMIN — ASPIRIN 81 MG: 81 TABLET ORAL at 07:47

## 2023-01-01 RX ADMIN — ALLOPURINOL 200 MG: 100 TABLET ORAL at 08:13

## 2023-01-01 RX ADMIN — CHLOROTHIAZIDE SODIUM 1000 MG: 500 INJECTION, POWDER, LYOPHILIZED, FOR SOLUTION INTRAVENOUS at 12:12

## 2023-01-01 RX ADMIN — MAGNESIUM OXIDE TAB 400 MG (241.3 MG ELEMENTAL MG) 400 MG: 400 (241.3 MG) TAB at 15:12

## 2023-01-01 RX ADMIN — BUMETANIDE 8 MG: 0.25 INJECTION INTRAMUSCULAR; INTRAVENOUS at 17:31

## 2023-01-01 RX ADMIN — BUMETANIDE 2 MG/HR: 0.25 INJECTION, SOLUTION INTRAMUSCULAR; INTRAVENOUS at 08:35

## 2023-01-01 RX ADMIN — INSULIN ASPART 1 UNITS: 100 INJECTION, SOLUTION INTRAVENOUS; SUBCUTANEOUS at 16:44

## 2023-01-01 RX ADMIN — ALLOPURINOL 200 MG: 100 TABLET ORAL at 08:51

## 2023-01-01 RX ADMIN — ZOLPIDEM TARTRATE 5 MG: 5 TABLET, FILM COATED ORAL at 21:47

## 2023-01-01 RX ADMIN — PAROXETINE HYDROCHLORIDE 20 MG: 10 TABLET, FILM COATED ORAL at 08:20

## 2023-01-01 RX ADMIN — B-COMPLEX W/ C & FOLIC ACID TAB 1 MG 1 TABLET: 1 TAB at 08:22

## 2023-01-01 RX ADMIN — BUMETANIDE 2 MG/HR: 0.25 INJECTION INTRAMUSCULAR; INTRAVENOUS at 00:29

## 2023-01-01 RX ADMIN — HYDRALAZINE HYDROCHLORIDE 50 MG: 50 TABLET, FILM COATED ORAL at 08:08

## 2023-01-01 RX ADMIN — CHLOROTHIAZIDE SODIUM 500 MG: 500 INJECTION, POWDER, LYOPHILIZED, FOR SOLUTION INTRAVENOUS at 14:43

## 2023-01-01 RX ADMIN — ALLOPURINOL 200 MG: 100 TABLET ORAL at 08:10

## 2023-01-01 RX ADMIN — LEVOTHYROXINE SODIUM 88 MCG: 88 TABLET ORAL at 09:20

## 2023-01-01 RX ADMIN — DICLOFENAC SODIUM 4 G: 10 GEL TOPICAL at 19:45

## 2023-01-01 RX ADMIN — PAROXETINE HYDROCHLORIDE 10 MG: 10 TABLET, FILM COATED ORAL at 08:29

## 2023-01-01 RX ADMIN — ASPIRIN 81 MG: 81 TABLET ORAL at 08:40

## 2023-01-01 RX ADMIN — CIPROFLOXACIN HYDROCHLORIDE 500 MG: 500 TABLET, FILM COATED ORAL at 20:57

## 2023-01-01 RX ADMIN — LORAZEPAM 0.5 MG: 0.5 TABLET ORAL at 15:32

## 2023-01-01 RX ADMIN — POTASSIUM CHLORIDE 20 MEQ: 750 TABLET, EXTENDED RELEASE ORAL at 22:15

## 2023-01-01 RX ADMIN — ALLOPURINOL 200 MG: 100 TABLET ORAL at 08:14

## 2023-01-01 RX ADMIN — VANCOMYCIN HYDROCHLORIDE 125 MG: KIT at 13:11

## 2023-01-01 RX ADMIN — POTASSIUM CHLORIDE 100 MEQ: 1500 TABLET, EXTENDED RELEASE ORAL at 13:47

## 2023-01-01 RX ADMIN — DOBUTAMINE HYDROCHLORIDE 5 MCG/KG/MIN: 200 INJECTION INTRAVENOUS at 08:30

## 2023-01-01 RX ADMIN — AMLODIPINE BESYLATE 10 MG: 10 TABLET ORAL at 08:17

## 2023-01-01 RX ADMIN — ZOLPIDEM TARTRATE 2.5 MG: 5 TABLET, FILM COATED ORAL at 22:25

## 2023-01-01 RX ADMIN — IRON SUCROSE 200 MG: 20 INJECTION, SOLUTION INTRAVENOUS at 15:53

## 2023-01-01 RX ADMIN — POTASSIUM CHLORIDE 100 MEQ: 1500 TABLET, EXTENDED RELEASE ORAL at 21:50

## 2023-01-01 RX ADMIN — FERROUS SULFATE TAB 325 MG (65 MG ELEMENTAL FE) 325 MG: 325 (65 FE) TAB at 08:17

## 2023-01-01 RX ADMIN — PANTOPRAZOLE SODIUM 40 MG: 40 TABLET, DELAYED RELEASE ORAL at 17:07

## 2023-01-01 RX ADMIN — PANTOPRAZOLE SODIUM 40 MG: 40 TABLET, DELAYED RELEASE ORAL at 09:05

## 2023-01-01 RX ADMIN — PANTOPRAZOLE SODIUM 40 MG: 40 TABLET, DELAYED RELEASE ORAL at 08:51

## 2023-01-01 RX ADMIN — FUROSEMIDE 60 MG: 10 INJECTION, SOLUTION INTRAVENOUS at 13:16

## 2023-01-01 RX ADMIN — AMIODARONE HYDROCHLORIDE 200 MG: 200 TABLET ORAL at 08:22

## 2023-01-01 RX ADMIN — MAGNESIUM OXIDE TAB 400 MG (241.3 MG ELEMENTAL MG) 400 MG: 400 (241.3 MG) TAB at 21:50

## 2023-01-01 RX ADMIN — LEVOTHYROXINE SODIUM 100 MCG: 100 TABLET ORAL at 11:43

## 2023-01-01 RX ADMIN — ZOLPIDEM TARTRATE 5 MG: 5 TABLET ORAL at 21:35

## 2023-01-01 RX ADMIN — MAGNESIUM SULFATE IN WATER 2 G: 40 INJECTION, SOLUTION INTRAVENOUS at 08:11

## 2023-01-01 RX ADMIN — EMPAGLIFLOZIN 10 MG: 10 TABLET, FILM COATED ORAL at 08:15

## 2023-01-01 RX ADMIN — TAMSULOSIN HYDROCHLORIDE 0.8 MG: 0.4 CAPSULE ORAL at 07:33

## 2023-01-01 RX ADMIN — BUMETANIDE 1 MG/HR: 0.25 INJECTION, SOLUTION INTRAMUSCULAR; INTRAVENOUS at 10:31

## 2023-01-01 RX ADMIN — BUMETANIDE 2 MG/HR: 0.25 INJECTION INTRAMUSCULAR; INTRAVENOUS at 22:23

## 2023-01-01 RX ADMIN — WHITE PETROLATUM: 1.75 OINTMENT TOPICAL at 20:36

## 2023-01-01 RX ADMIN — POTASSIUM CHLORIDE 10 MEQ: 7.46 INJECTION, SOLUTION INTRAVENOUS at 13:38

## 2023-01-01 RX ADMIN — MAGNESIUM OXIDE TAB 400 MG (241.3 MG ELEMENTAL MG) 400 MG: 400 (241.3 MG) TAB at 12:00

## 2023-01-01 RX ADMIN — POTASSIUM CHLORIDE 80 MEQ: 1500 TABLET, EXTENDED RELEASE ORAL at 11:40

## 2023-01-01 RX ADMIN — B-COMPLEX W/ C & FOLIC ACID TAB 1 MG 1 TABLET: 1 TAB at 08:15

## 2023-01-01 RX ADMIN — BUMETANIDE 2 MG/HR: 0.25 INJECTION INTRAMUSCULAR; INTRAVENOUS at 03:39

## 2023-01-01 RX ADMIN — Medication 1 CAPSULE: at 11:12

## 2023-01-01 RX ADMIN — POTASSIUM CHLORIDE 40 MEQ: 750 TABLET, EXTENDED RELEASE ORAL at 07:49

## 2023-01-01 RX ADMIN — LEVOTHYROXINE SODIUM 100 MCG: 100 TABLET ORAL at 08:31

## 2023-01-01 RX ADMIN — POTASSIUM CHLORIDE 80 MEQ: 1500 TABLET, EXTENDED RELEASE ORAL at 08:01

## 2023-01-01 RX ADMIN — PANTOPRAZOLE SODIUM 40 MG: 40 TABLET, DELAYED RELEASE ORAL at 17:32

## 2023-01-01 RX ADMIN — ZOLPIDEM TARTRATE 5 MG: 5 TABLET ORAL at 21:13

## 2023-01-01 RX ADMIN — ALLOPURINOL 200 MG: 100 TABLET ORAL at 08:30

## 2023-01-01 RX ADMIN — TAMSULOSIN HYDROCHLORIDE 0.8 MG: 0.4 CAPSULE ORAL at 20:40

## 2023-01-01 RX ADMIN — POTASSIUM CHLORIDE 40 MEQ: 750 TABLET, EXTENDED RELEASE ORAL at 20:27

## 2023-01-01 RX ADMIN — ACETAZOLAMIDE SODIUM 500 MG: 500 INJECTION, POWDER, LYOPHILIZED, FOR SOLUTION INTRAVENOUS at 17:06

## 2023-01-01 RX ADMIN — ASPIRIN 81 MG: 81 TABLET ORAL at 08:04

## 2023-01-01 RX ADMIN — CILOSTAZOL 50 MG: 50 TABLET ORAL at 08:35

## 2023-01-01 RX ADMIN — HYDRALAZINE HYDROCHLORIDE 25 MG: 25 TABLET, FILM COATED ORAL at 14:04

## 2023-01-01 RX ADMIN — Medication 1 CAPSULE: at 20:23

## 2023-01-01 RX ADMIN — CHLOROTHIAZIDE SODIUM 1000 MG: 500 INJECTION, POWDER, LYOPHILIZED, FOR SOLUTION INTRAVENOUS at 13:21

## 2023-01-01 RX ADMIN — MAGNESIUM OXIDE TAB 400 MG (241.3 MG ELEMENTAL MG) 400 MG: 400 (241.3 MG) TAB at 13:01

## 2023-01-01 RX ADMIN — MAGNESIUM OXIDE TAB 400 MG (240 MG ELEMENTAL MG) 400 MG: 400 (240 MG) TAB at 20:28

## 2023-01-01 RX ADMIN — POTASSIUM CHLORIDE 100 MEQ: 1500 TABLET, EXTENDED RELEASE ORAL at 08:47

## 2023-01-01 RX ADMIN — BUMETANIDE 2 MG/HR: 0.25 INJECTION INTRAMUSCULAR; INTRAVENOUS at 12:19

## 2023-01-01 RX ADMIN — HYDRALAZINE HYDROCHLORIDE 50 MG: 50 TABLET, FILM COATED ORAL at 19:48

## 2023-01-01 RX ADMIN — HYDRALAZINE HYDROCHLORIDE 25 MG: 25 TABLET, FILM COATED ORAL at 13:57

## 2023-01-01 RX ADMIN — AMIODARONE HYDROCHLORIDE 200 MG: 200 TABLET ORAL at 08:13

## 2023-01-01 RX ADMIN — TORSEMIDE 100 MG: 100 TABLET ORAL at 14:22

## 2023-01-01 RX ADMIN — HYDRALAZINE HYDROCHLORIDE 25 MG: 25 TABLET, FILM COATED ORAL at 06:25

## 2023-01-01 RX ADMIN — Medication 400 MG: at 20:22

## 2023-01-01 RX ADMIN — TAMSULOSIN HYDROCHLORIDE 0.8 MG: 0.4 CAPSULE ORAL at 21:01

## 2023-01-01 RX ADMIN — CILOSTAZOL 100 MG: 100 TABLET ORAL at 16:17

## 2023-01-01 RX ADMIN — TORSEMIDE 100 MG: 100 TABLET ORAL at 23:00

## 2023-01-01 RX ADMIN — DAPTOMYCIN 500 MG: 500 INJECTION, POWDER, LYOPHILIZED, FOR SOLUTION INTRAVENOUS at 10:33

## 2023-01-01 RX ADMIN — POTASSIUM CHLORIDE 40 MEQ: 750 TABLET, EXTENDED RELEASE ORAL at 08:45

## 2023-01-01 RX ADMIN — LORAZEPAM 0.5 MG: 0.5 TABLET ORAL at 17:08

## 2023-01-01 RX ADMIN — Medication 2 MG/HR: at 02:22

## 2023-01-01 RX ADMIN — PANTOPRAZOLE SODIUM 40 MG: 40 TABLET, DELAYED RELEASE ORAL at 08:04

## 2023-01-01 RX ADMIN — DAPAGLIFLOZIN 5 MG: 5 TABLET, FILM COATED ORAL at 07:47

## 2023-01-01 RX ADMIN — BUMETANIDE 2 MG/HR: 0.25 INJECTION, SOLUTION INTRAMUSCULAR; INTRAVENOUS at 08:38

## 2023-01-01 RX ADMIN — MAGNESIUM OXIDE TAB 400 MG (241.3 MG ELEMENTAL MG) 400 MG: 400 (241.3 MG) TAB at 12:07

## 2023-01-01 RX ADMIN — AMIODARONE HYDROCHLORIDE 200 MG: 200 TABLET ORAL at 08:14

## 2023-01-01 RX ADMIN — POTASSIUM CHLORIDE 60 MEQ: 1500 TABLET, EXTENDED RELEASE ORAL at 21:01

## 2023-01-01 RX ADMIN — CHLOROTHIAZIDE SODIUM 500 MG: 500 INJECTION, POWDER, LYOPHILIZED, FOR SOLUTION INTRAVENOUS at 09:02

## 2023-01-01 RX ADMIN — HYDRALAZINE HYDROCHLORIDE 25 MG: 25 TABLET, FILM COATED ORAL at 14:00

## 2023-01-01 RX ADMIN — METHOCARBAMOL 500 MG: 500 TABLET ORAL at 20:23

## 2023-01-01 RX ADMIN — ALLOPURINOL 200 MG: 100 TABLET ORAL at 08:25

## 2023-01-01 RX ADMIN — ACETAMINOPHEN 975 MG: 325 TABLET, FILM COATED ORAL at 13:23

## 2023-01-01 RX ADMIN — TORSEMIDE 100 MG: 100 TABLET ORAL at 14:24

## 2023-01-01 RX ADMIN — SPIRONOLACTONE 25 MG: 25 TABLET, FILM COATED ORAL at 11:39

## 2023-01-01 RX ADMIN — Medication 5 MG: at 21:49

## 2023-01-01 RX ADMIN — PANTOPRAZOLE SODIUM 40 MG: 40 TABLET, DELAYED RELEASE ORAL at 16:17

## 2023-01-01 RX ADMIN — ALLOPURINOL 200 MG: 100 TABLET ORAL at 08:08

## 2023-01-01 RX ADMIN — METHOCARBAMOL 500 MG: 500 TABLET ORAL at 20:43

## 2023-01-01 RX ADMIN — CILOSTAZOL 100 MG: 100 TABLET ORAL at 10:39

## 2023-01-01 RX ADMIN — BUMETANIDE 8 MG: 0.25 INJECTION INTRAMUSCULAR; INTRAVENOUS at 11:56

## 2023-01-01 RX ADMIN — ALLOPURINOL 200 MG: 100 TABLET ORAL at 09:14

## 2023-01-01 RX ADMIN — HYDRALAZINE HYDROCHLORIDE 100 MG: 100 TABLET ORAL at 19:37

## 2023-01-01 RX ADMIN — BUMETANIDE 7 MG: 0.25 INJECTION INTRAMUSCULAR; INTRAVENOUS at 13:17

## 2023-01-01 RX ADMIN — TRAMADOL HYDROCHLORIDE 50 MG: 50 TABLET, COATED ORAL at 12:41

## 2023-01-01 RX ADMIN — Medication 12.5 MG: at 22:25

## 2023-01-01 RX ADMIN — HYDRALAZINE HYDROCHLORIDE 25 MG: 25 TABLET, FILM COATED ORAL at 23:56

## 2023-01-01 RX ADMIN — POTASSIUM CHLORIDE 60 MEQ: 1500 TABLET, EXTENDED RELEASE ORAL at 08:14

## 2023-01-01 RX ADMIN — DAPTOMYCIN 500 MG: 500 INJECTION, POWDER, LYOPHILIZED, FOR SOLUTION INTRAVENOUS at 11:54

## 2023-01-01 RX ADMIN — IRON SUCROSE 200 MG: 20 INJECTION, SOLUTION INTRAVENOUS at 15:42

## 2023-01-01 RX ADMIN — Medication 1 CAPSULE: at 08:14

## 2023-01-01 RX ADMIN — INSULIN ASPART 1 UNITS: 100 INJECTION, SOLUTION INTRAVENOUS; SUBCUTANEOUS at 17:39

## 2023-01-01 RX ADMIN — PANTOPRAZOLE SODIUM 40 MG: 40 TABLET, DELAYED RELEASE ORAL at 10:39

## 2023-01-01 RX ADMIN — CIPROFLOXACIN HYDROCHLORIDE 500 MG: 500 TABLET, FILM COATED ORAL at 09:21

## 2023-01-01 RX ADMIN — POTASSIUM CHLORIDE 80 MEQ: 1500 TABLET, EXTENDED RELEASE ORAL at 08:12

## 2023-01-01 RX ADMIN — WARFARIN SODIUM 3 MG: 3 TABLET ORAL at 18:48

## 2023-01-01 RX ADMIN — HYDRALAZINE HYDROCHLORIDE 50 MG: 50 TABLET, FILM COATED ORAL at 07:48

## 2023-01-01 RX ADMIN — FINASTERIDE 5 MG: 5 TABLET, FILM COATED ORAL at 11:45

## 2023-01-01 RX ADMIN — WARFARIN SODIUM 2 MG: 2 TABLET ORAL at 18:02

## 2023-01-01 RX ADMIN — WARFARIN SODIUM 4 MG: 4 TABLET ORAL at 17:58

## 2023-01-01 RX ADMIN — TORSEMIDE 100 MG: 100 TABLET ORAL at 08:17

## 2023-01-01 RX ADMIN — MAGNESIUM OXIDE TAB 400 MG (241.3 MG ELEMENTAL MG) 400 MG: 400 (241.3 MG) TAB at 12:44

## 2023-01-01 RX ADMIN — CHLOROTHIAZIDE SODIUM 1000 MG: 500 INJECTION, POWDER, LYOPHILIZED, FOR SOLUTION INTRAVENOUS at 09:43

## 2023-01-01 RX ADMIN — CEFEPIME HYDROCHLORIDE 1 G: 1 INJECTION, POWDER, FOR SOLUTION INTRAMUSCULAR; INTRAVENOUS at 05:16

## 2023-01-01 RX ADMIN — ACETAZOLAMIDE SODIUM 500 MG: 500 INJECTION, POWDER, LYOPHILIZED, FOR SOLUTION INTRAVENOUS at 15:23

## 2023-01-01 RX ADMIN — HYDRALAZINE HYDROCHLORIDE 100 MG: 100 TABLET, FILM COATED ORAL at 20:31

## 2023-01-01 RX ADMIN — CILOSTAZOL 50 MG: 50 TABLET ORAL at 10:00

## 2023-01-01 RX ADMIN — TAMSULOSIN HYDROCHLORIDE 0.8 MG: 0.4 CAPSULE ORAL at 20:17

## 2023-01-01 RX ADMIN — Medication 400 MG: at 13:45

## 2023-01-01 RX ADMIN — Medication 5 MG: at 01:04

## 2023-01-01 RX ADMIN — SPIRONOLACTONE 25 MG: 25 TABLET, FILM COATED ORAL at 07:39

## 2023-01-01 RX ADMIN — POLYETHYLENE GLYCOL 3350 17 G: 17 POWDER, FOR SOLUTION ORAL at 08:19

## 2023-01-01 RX ADMIN — POTASSIUM CHLORIDE 20 MEQ: 750 TABLET, EXTENDED RELEASE ORAL at 11:12

## 2023-01-01 RX ADMIN — ACETAZOLAMIDE SODIUM 500 MG: 500 INJECTION, POWDER, LYOPHILIZED, FOR SOLUTION INTRAVENOUS at 13:10

## 2023-01-01 RX ADMIN — POTASSIUM CHLORIDE 100 MEQ: 1500 TABLET, EXTENDED RELEASE ORAL at 13:59

## 2023-01-01 RX ADMIN — PAROXETINE HYDROCHLORIDE 10 MG: 10 TABLET, FILM COATED ORAL at 11:43

## 2023-01-01 RX ADMIN — FINASTERIDE 5 MG: 5 TABLET, FILM COATED ORAL at 08:21

## 2023-01-01 RX ADMIN — HYDRALAZINE HYDROCHLORIDE 100 MG: 100 TABLET, FILM COATED ORAL at 21:01

## 2023-01-01 RX ADMIN — POTASSIUM CHLORIDE 80 MEQ: 1500 TABLET, EXTENDED RELEASE ORAL at 13:13

## 2023-01-01 RX ADMIN — MAGNESIUM OXIDE TAB 400 MG (240 MG ELEMENTAL MG) 400 MG: 400 (240 MG) TAB at 08:48

## 2023-01-01 RX ADMIN — POTASSIUM CHLORIDE 80 MEQ: 1500 TABLET, EXTENDED RELEASE ORAL at 20:15

## 2023-01-01 RX ADMIN — POTASSIUM CHLORIDE 80 MEQ: 1500 TABLET, EXTENDED RELEASE ORAL at 21:49

## 2023-01-01 RX ADMIN — CIPROFLOXACIN 750 MG: 250 TABLET, FILM COATED ORAL at 07:24

## 2023-01-01 RX ADMIN — HYDRALAZINE HYDROCHLORIDE 50 MG: 50 TABLET, FILM COATED ORAL at 20:40

## 2023-01-01 RX ADMIN — AMIODARONE HYDROCHLORIDE 200 MG: 200 TABLET ORAL at 08:11

## 2023-01-01 RX ADMIN — DOBUTAMINE HYDROCHLORIDE 2.5 MCG/KG/MIN: 200 INJECTION INTRAVENOUS at 08:08

## 2023-01-01 RX ADMIN — PANTOPRAZOLE SODIUM 40 MG: 40 TABLET, DELAYED RELEASE ORAL at 16:19

## 2023-01-01 RX ADMIN — POTASSIUM CHLORIDE 80 MEQ: 1500 TABLET, EXTENDED RELEASE ORAL at 20:21

## 2023-01-01 RX ADMIN — PANTOPRAZOLE SODIUM 40 MG: 40 TABLET, DELAYED RELEASE ORAL at 15:49

## 2023-01-01 RX ADMIN — ZOLPIDEM TARTRATE 5 MG: 5 TABLET ORAL at 21:51

## 2023-01-01 RX ADMIN — MAGNESIUM OXIDE TAB 400 MG (241.3 MG ELEMENTAL MG) 400 MG: 400 (241.3 MG) TAB at 20:33

## 2023-01-01 RX ADMIN — MAGNESIUM OXIDE TAB 400 MG (241.3 MG ELEMENTAL MG) 400 MG: 400 (241.3 MG) TAB at 12:39

## 2023-01-01 RX ADMIN — HYDRALAZINE HYDROCHLORIDE 100 MG: 100 TABLET ORAL at 07:35

## 2023-01-01 RX ADMIN — CEFEPIME HYDROCHLORIDE 1 G: 1 INJECTION, POWDER, FOR SOLUTION INTRAMUSCULAR; INTRAVENOUS at 02:59

## 2023-01-01 RX ADMIN — POTASSIUM CHLORIDE 40 MEQ: 750 TABLET, EXTENDED RELEASE ORAL at 08:13

## 2023-01-01 RX ADMIN — AMLODIPINE BESYLATE 10 MG: 10 TABLET ORAL at 07:33

## 2023-01-01 RX ADMIN — AMIODARONE HYDROCHLORIDE 200 MG: 200 TABLET ORAL at 07:47

## 2023-01-01 RX ADMIN — FINASTERIDE 5 MG: 5 TABLET, FILM COATED ORAL at 07:47

## 2023-01-01 RX ADMIN — AMLODIPINE BESYLATE 10 MG: 10 TABLET ORAL at 08:03

## 2023-01-01 RX ADMIN — PANTOPRAZOLE SODIUM 40 MG: 40 TABLET, DELAYED RELEASE ORAL at 08:55

## 2023-01-01 RX ADMIN — B-COMPLEX W/ C & FOLIC ACID TAB 1 MG 1 TABLET: 1 TAB at 10:39

## 2023-01-01 RX ADMIN — BUMETANIDE 2 MG/HR: 0.25 INJECTION INTRAMUSCULAR; INTRAVENOUS at 01:15

## 2023-01-01 RX ADMIN — PAROXETINE 20 MG: 20 TABLET, FILM COATED ORAL at 08:12

## 2023-01-01 RX ADMIN — MAGNESIUM OXIDE TAB 400 MG (240 MG ELEMENTAL MG) 400 MG: 400 (240 MG) TAB at 08:24

## 2023-01-01 RX ADMIN — HYDRALAZINE HYDROCHLORIDE 25 MG: 25 TABLET, FILM COATED ORAL at 14:38

## 2023-01-01 RX ADMIN — PANTOPRAZOLE SODIUM 40 MG: 40 TABLET, DELAYED RELEASE ORAL at 08:27

## 2023-01-01 RX ADMIN — ACETAMINOPHEN 325 MG: 325 TABLET ORAL at 18:24

## 2023-01-01 RX ADMIN — LEVOTHYROXINE SODIUM 100 MCG: 100 TABLET ORAL at 08:13

## 2023-01-01 RX ADMIN — FINASTERIDE 5 MG: 5 TABLET, FILM COATED ORAL at 07:53

## 2023-01-01 RX ADMIN — CILOSTAZOL 100 MG: 100 TABLET ORAL at 16:11

## 2023-01-01 RX ADMIN — HYDRALAZINE HYDROCHLORIDE 25 MG: 25 TABLET, FILM COATED ORAL at 06:02

## 2023-01-01 RX ADMIN — ALTEPLASE 2 MG: 2.2 INJECTION, POWDER, LYOPHILIZED, FOR SOLUTION INTRAVENOUS at 16:46

## 2023-01-01 RX ADMIN — HYDRALAZINE HYDROCHLORIDE 25 MG: 25 TABLET, FILM COATED ORAL at 21:44

## 2023-01-01 RX ADMIN — HYDRALAZINE HYDROCHLORIDE 25 MG: 25 TABLET, FILM COATED ORAL at 14:08

## 2023-01-01 RX ADMIN — LEVOTHYROXINE SODIUM 100 MCG: 100 TABLET ORAL at 08:51

## 2023-01-01 RX ADMIN — CILOSTAZOL 50 MG: 50 TABLET ORAL at 11:36

## 2023-01-01 RX ADMIN — TAMSULOSIN HYDROCHLORIDE 0.8 MG: 0.4 CAPSULE ORAL at 20:23

## 2023-01-01 RX ADMIN — FLUOXETINE 30 MG: 20 CAPSULE ORAL at 08:13

## 2023-01-01 RX ADMIN — SPIRONOLACTONE 25 MG: 25 TABLET, FILM COATED ORAL at 08:13

## 2023-01-01 RX ADMIN — ACETAMINOPHEN 650 MG: 325 TABLET ORAL at 15:45

## 2023-01-01 RX ADMIN — AMIODARONE HYDROCHLORIDE 200 MG: 200 TABLET ORAL at 09:06

## 2023-01-01 RX ADMIN — TAMSULOSIN HYDROCHLORIDE 0.8 MG: 0.4 CAPSULE ORAL at 20:25

## 2023-01-01 RX ADMIN — SPIRONOLACTONE 25 MG: 25 TABLET, FILM COATED ORAL at 08:24

## 2023-01-01 RX ADMIN — BUMETANIDE 3 MG: 0.25 INJECTION INTRAMUSCULAR; INTRAVENOUS at 15:55

## 2023-01-01 RX ADMIN — PANTOPRAZOLE SODIUM 40 MG: 40 TABLET, DELAYED RELEASE ORAL at 15:56

## 2023-01-01 RX ADMIN — ZOLPIDEM TARTRATE 5 MG: 5 TABLET ORAL at 21:36

## 2023-01-01 RX ADMIN — POTASSIUM CHLORIDE 80 MEQ: 1500 TABLET, EXTENDED RELEASE ORAL at 07:33

## 2023-01-01 RX ADMIN — Medication 2 MG/HR: at 08:03

## 2023-01-01 RX ADMIN — AMLODIPINE BESYLATE 10 MG: 10 TABLET ORAL at 07:47

## 2023-01-01 RX ADMIN — PANTOPRAZOLE SODIUM 40 MG: 40 TABLET, DELAYED RELEASE ORAL at 15:32

## 2023-01-01 RX ADMIN — HYDRALAZINE HYDROCHLORIDE 25 MG: 25 TABLET, FILM COATED ORAL at 05:24

## 2023-01-01 RX ADMIN — MAGNESIUM OXIDE TAB 400 MG (241.3 MG ELEMENTAL MG) 400 MG: 400 (241.3 MG) TAB at 12:31

## 2023-01-01 RX ADMIN — METHOCARBAMOL 500 MG: 500 TABLET ORAL at 20:58

## 2023-01-01 RX ADMIN — TAMSULOSIN HYDROCHLORIDE 0.8 MG: 0.4 CAPSULE ORAL at 21:10

## 2023-01-01 RX ADMIN — HYDRALAZINE HYDROCHLORIDE 50 MG: 50 TABLET, FILM COATED ORAL at 10:53

## 2023-01-01 RX ADMIN — POTASSIUM CHLORIDE 10 MEQ: 750 TABLET, EXTENDED RELEASE ORAL at 08:24

## 2023-01-01 RX ADMIN — WARFARIN SODIUM 2 MG: 1 TABLET ORAL at 17:35

## 2023-01-01 RX ADMIN — POLYETHYLENE GLYCOL 3350, SODIUM SULFATE ANHYDROUS, SODIUM BICARBONATE, SODIUM CHLORIDE, POTASSIUM CHLORIDE 2000 ML: 236; 22.74; 6.74; 5.86; 2.97 POWDER, FOR SOLUTION ORAL at 16:22

## 2023-01-01 RX ADMIN — METHOCARBAMOL 500 MG: 500 TABLET ORAL at 15:55

## 2023-01-01 RX ADMIN — CHLOROTHIAZIDE SODIUM 500 MG: 500 INJECTION, POWDER, LYOPHILIZED, FOR SOLUTION INTRAVENOUS at 08:47

## 2023-01-01 RX ADMIN — MAGNESIUM OXIDE TAB 400 MG (241.3 MG ELEMENTAL MG) 400 MG: 400 (241.3 MG) TAB at 13:20

## 2023-01-01 RX ADMIN — TAMSULOSIN HYDROCHLORIDE 0.8 MG: 0.4 CAPSULE ORAL at 20:09

## 2023-01-01 RX ADMIN — Medication 1 CAPSULE: at 10:53

## 2023-01-01 RX ADMIN — CIPROFLOXACIN HYDROCHLORIDE 500 MG: 500 TABLET, FILM COATED ORAL at 19:48

## 2023-01-01 RX ADMIN — DAPAGLIFLOZIN 10 MG: 10 TABLET, FILM COATED ORAL at 08:27

## 2023-01-01 RX ADMIN — WHITE PETROLATUM: 1.75 OINTMENT TOPICAL at 08:17

## 2023-01-01 RX ADMIN — BUMETANIDE 5 MG: 0.25 INJECTION INTRAMUSCULAR; INTRAVENOUS at 11:27

## 2023-01-01 RX ADMIN — Medication 1 CAPSULE: at 08:23

## 2023-01-01 RX ADMIN — HYDRALAZINE HYDROCHLORIDE 25 MG: 25 TABLET, FILM COATED ORAL at 13:01

## 2023-01-01 RX ADMIN — ACETAZOLAMIDE SODIUM 500 MG: 500 INJECTION, POWDER, LYOPHILIZED, FOR SOLUTION INTRAVENOUS at 08:25

## 2023-01-01 RX ADMIN — AMIODARONE HYDROCHLORIDE 200 MG: 200 TABLET ORAL at 07:38

## 2023-01-01 RX ADMIN — FINASTERIDE 5 MG: 5 TABLET, FILM COATED ORAL at 08:31

## 2023-01-01 RX ADMIN — ALLOPURINOL 200 MG: 100 TABLET ORAL at 08:21

## 2023-01-01 RX ADMIN — ISOSORBIDE MONONITRATE 120 MG: 60 TABLET, EXTENDED RELEASE ORAL at 08:14

## 2023-01-01 RX ADMIN — WARFARIN SODIUM 5 MG: 5 TABLET ORAL at 17:54

## 2023-01-01 RX ADMIN — WARFARIN SODIUM 4 MG: 4 TABLET ORAL at 17:27

## 2023-01-01 RX ADMIN — HYDRALAZINE HYDROCHLORIDE 50 MG: 50 TABLET, FILM COATED ORAL at 13:59

## 2023-01-01 RX ADMIN — PAROXETINE 20 MG: 20 TABLET, FILM COATED ORAL at 08:51

## 2023-01-01 RX ADMIN — POTASSIUM CHLORIDE 20 MEQ: 29.8 INJECTION, SOLUTION INTRAVENOUS at 08:28

## 2023-01-01 RX ADMIN — HYDRALAZINE HYDROCHLORIDE 100 MG: 100 TABLET, FILM COATED ORAL at 20:07

## 2023-01-01 RX ADMIN — MAGNESIUM OXIDE TAB 400 MG (241.3 MG ELEMENTAL MG) 400 MG: 400 (241.3 MG) TAB at 20:23

## 2023-01-01 RX ADMIN — HYDROCHLOROTHIAZIDE 100 MG: 25 TABLET ORAL at 09:35

## 2023-01-01 RX ADMIN — INSULIN ASPART 1 UNITS: 100 INJECTION, SOLUTION INTRAVENOUS; SUBCUTANEOUS at 10:36

## 2023-01-01 RX ADMIN — CEFEPIME HYDROCHLORIDE 1 G: 1 INJECTION, POWDER, FOR SOLUTION INTRAMUSCULAR; INTRAVENOUS at 09:37

## 2023-01-01 RX ADMIN — ALLOPURINOL 200 MG: 100 TABLET ORAL at 08:03

## 2023-01-01 RX ADMIN — METHOCARBAMOL 500 MG: 500 TABLET ORAL at 19:00

## 2023-01-01 RX ADMIN — Medication 12.5 MG: at 21:49

## 2023-01-01 RX ADMIN — FLUOXETINE 30 MG: 20 CAPSULE ORAL at 07:33

## 2023-01-01 RX ADMIN — MAGNESIUM OXIDE TAB 400 MG (240 MG ELEMENTAL MG) 400 MG: 400 (240 MG) TAB at 19:45

## 2023-01-01 RX ADMIN — POTASSIUM CHLORIDE 80 MEQ: 1500 TABLET, EXTENDED RELEASE ORAL at 14:55

## 2023-01-01 RX ADMIN — TORSEMIDE 100 MG: 100 TABLET ORAL at 08:23

## 2023-01-01 RX ADMIN — HYDRALAZINE HYDROCHLORIDE 100 MG: 100 TABLET, FILM COATED ORAL at 19:53

## 2023-01-01 RX ADMIN — EMPAGLIFLOZIN 10 MG: 10 TABLET, FILM COATED ORAL at 12:07

## 2023-01-01 RX ADMIN — MAGNESIUM OXIDE TAB 400 MG (241.3 MG ELEMENTAL MG) 400 MG: 400 (241.3 MG) TAB at 14:53

## 2023-01-01 RX ADMIN — WARFARIN SODIUM 4 MG: 4 TABLET ORAL at 22:16

## 2023-01-01 RX ADMIN — ACETAZOLAMIDE SODIUM 500 MG: 500 INJECTION, POWDER, LYOPHILIZED, FOR SOLUTION INTRAVENOUS at 10:07

## 2023-01-01 RX ADMIN — POTASSIUM CHLORIDE 80 MEQ: 1500 TABLET, EXTENDED RELEASE ORAL at 08:04

## 2023-01-01 RX ADMIN — HYDRALAZINE HYDROCHLORIDE 50 MG: 50 TABLET, FILM COATED ORAL at 13:45

## 2023-01-01 RX ADMIN — MAGNESIUM OXIDE TAB 400 MG (241.3 MG ELEMENTAL MG) 400 MG: 400 (241.3 MG) TAB at 11:28

## 2023-01-01 RX ADMIN — HYDRALAZINE HYDROCHLORIDE 100 MG: 100 TABLET ORAL at 15:00

## 2023-01-01 RX ADMIN — BUMETANIDE 2 MG/HR: 0.25 INJECTION INTRAMUSCULAR; INTRAVENOUS at 03:40

## 2023-01-01 RX ADMIN — B-COMPLEX W/ C & FOLIC ACID TAB 1 MG 1 TABLET: 1 TAB at 08:03

## 2023-01-01 RX ADMIN — TAMSULOSIN HYDROCHLORIDE 0.8 MG: 0.4 CAPSULE ORAL at 19:00

## 2023-01-01 RX ADMIN — TAMSULOSIN HYDROCHLORIDE 0.8 MG: 0.4 CAPSULE ORAL at 07:34

## 2023-01-01 RX ADMIN — DAPAGLIFLOZIN 10 MG: 10 TABLET, FILM COATED ORAL at 07:40

## 2023-01-01 RX ADMIN — Medication 5 ML: at 17:57

## 2023-01-01 RX ADMIN — WARFARIN SODIUM 2 MG: 1 TABLET ORAL at 18:08

## 2023-01-01 RX ADMIN — ZOLPIDEM TARTRATE 5 MG: 5 TABLET ORAL at 21:22

## 2023-01-01 RX ADMIN — ASPIRIN 81 MG: 81 TABLET ORAL at 08:27

## 2023-01-01 RX ADMIN — MAGNESIUM OXIDE TAB 400 MG (241.3 MG ELEMENTAL MG) 400 MG: 400 (241.3 MG) TAB at 22:04

## 2023-01-01 RX ADMIN — Medication 400 MG: at 11:41

## 2023-01-01 RX ADMIN — ISOSORBIDE MONONITRATE 90 MG: 60 TABLET, EXTENDED RELEASE ORAL at 08:46

## 2023-01-01 RX ADMIN — MAGNESIUM OXIDE TAB 400 MG (240 MG ELEMENTAL MG) 400 MG: 400 (240 MG) TAB at 20:05

## 2023-01-01 RX ADMIN — PANTOPRAZOLE SODIUM 40 MG: 40 INJECTION, POWDER, FOR SOLUTION INTRAVENOUS at 08:21

## 2023-01-01 RX ADMIN — DICLOFENAC SODIUM 2 G: 10 GEL TOPICAL at 09:14

## 2023-01-01 RX ADMIN — WARFARIN SODIUM 6 MG: 3 TABLET ORAL at 18:45

## 2023-01-01 RX ADMIN — LEVOTHYROXINE SODIUM 88 MCG: 88 TABLET ORAL at 08:07

## 2023-01-01 RX ADMIN — POTASSIUM CHLORIDE 10 MEQ: 7.46 INJECTION, SOLUTION INTRAVENOUS at 12:45

## 2023-01-01 RX ADMIN — POTASSIUM CHLORIDE 100 MEQ: 1500 TABLET, EXTENDED RELEASE ORAL at 14:09

## 2023-01-01 RX ADMIN — HYDRALAZINE HYDROCHLORIDE 100 MG: 100 TABLET, FILM COATED ORAL at 07:37

## 2023-01-01 RX ADMIN — BUMETANIDE 2 MG/HR: 0.25 INJECTION INTRAMUSCULAR; INTRAVENOUS at 23:32

## 2023-01-01 RX ADMIN — Medication 2 MG/HR: at 08:24

## 2023-01-01 RX ADMIN — PANTOPRAZOLE SODIUM 40 MG: 40 TABLET, DELAYED RELEASE ORAL at 17:08

## 2023-01-01 RX ADMIN — FINASTERIDE 5 MG: 5 TABLET, FILM COATED ORAL at 08:48

## 2023-01-01 RX ADMIN — Medication 2 MG/HR: at 18:30

## 2023-01-01 RX ADMIN — ACETAMINOPHEN 650 MG: 325 TABLET, FILM COATED ORAL at 16:14

## 2023-01-01 RX ADMIN — Medication 12.5 MG: at 21:44

## 2023-01-01 RX ADMIN — PAROXETINE 20 MG: 20 TABLET, FILM COATED ORAL at 07:24

## 2023-01-01 RX ADMIN — PAROXETINE 20 MG: 20 TABLET, FILM COATED ORAL at 08:41

## 2023-01-01 RX ADMIN — POTASSIUM CHLORIDE 60 MEQ: 1500 TABLET, EXTENDED RELEASE ORAL at 20:17

## 2023-01-01 RX ADMIN — TAMSULOSIN HYDROCHLORIDE 0.8 MG: 0.4 CAPSULE ORAL at 20:20

## 2023-01-01 RX ADMIN — PANTOPRAZOLE SODIUM 40 MG: 40 INJECTION, POWDER, FOR SOLUTION INTRAVENOUS at 20:22

## 2023-01-01 RX ADMIN — EMPAGLIFLOZIN 10 MG: 10 TABLET, FILM COATED ORAL at 08:24

## 2023-01-01 RX ADMIN — CHLOROTHIAZIDE SODIUM 500 MG: 500 INJECTION, POWDER, LYOPHILIZED, FOR SOLUTION INTRAVENOUS at 14:28

## 2023-01-01 RX ADMIN — LEVOTHYROXINE SODIUM 88 MCG: 88 TABLET ORAL at 08:23

## 2023-01-01 RX ADMIN — AMIODARONE HYDROCHLORIDE 200 MG: 200 TABLET ORAL at 08:03

## 2023-01-01 RX ADMIN — FERROUS SULFATE TAB 325 MG (65 MG ELEMENTAL FE) 325 MG: 325 (65 FE) TAB at 08:27

## 2023-01-01 RX ADMIN — HYDRALAZINE HYDROCHLORIDE 100 MG: 100 TABLET, FILM COATED ORAL at 15:20

## 2023-01-01 RX ADMIN — WATER 500 MG: 1 INJECTION INTRAMUSCULAR; INTRAVENOUS; SUBCUTANEOUS at 11:04

## 2023-01-01 RX ADMIN — POTASSIUM CHLORIDE 40 MEQ: 750 TABLET, EXTENDED RELEASE ORAL at 08:12

## 2023-01-01 RX ADMIN — TAMSULOSIN HYDROCHLORIDE 0.8 MG: 0.4 CAPSULE ORAL at 20:18

## 2023-01-01 RX ADMIN — CHLOROTHIAZIDE SODIUM 1000 MG: 500 INJECTION, POWDER, LYOPHILIZED, FOR SOLUTION INTRAVENOUS at 07:36

## 2023-01-01 RX ADMIN — POTASSIUM CHLORIDE 10 MEQ: 7.46 INJECTION, SOLUTION INTRAVENOUS at 16:48

## 2023-01-01 RX ADMIN — CIPROFLOXACIN HYDROCHLORIDE 500 MG: 500 TABLET, FILM COATED ORAL at 08:26

## 2023-01-01 RX ADMIN — PANTOPRAZOLE SODIUM 40 MG: 40 INJECTION, POWDER, FOR SOLUTION INTRAVENOUS at 08:03

## 2023-01-01 RX ADMIN — AMIODARONE HYDROCHLORIDE 200 MG: 200 TABLET ORAL at 08:25

## 2023-01-01 RX ADMIN — BUMETANIDE 2 MG/HR: 0.25 INJECTION INTRAMUSCULAR; INTRAVENOUS at 16:41

## 2023-01-01 RX ADMIN — HYDRALAZINE HYDROCHLORIDE 25 MG: 25 TABLET, FILM COATED ORAL at 22:59

## 2023-01-01 RX ADMIN — Medication 1 TABLET: at 08:17

## 2023-01-01 RX ADMIN — PANTOPRAZOLE SODIUM 40 MG: 40 TABLET, DELAYED RELEASE ORAL at 17:34

## 2023-01-01 RX ADMIN — DAPTOMYCIN 500 MG: 500 INJECTION, POWDER, LYOPHILIZED, FOR SOLUTION INTRAVENOUS at 10:08

## 2023-01-01 RX ADMIN — ACETAZOLAMIDE SODIUM 500 MG: 500 INJECTION, POWDER, LYOPHILIZED, FOR SOLUTION INTRAVENOUS at 08:36

## 2023-01-01 RX ADMIN — ISOSORBIDE MONONITRATE 120 MG: 60 TABLET, EXTENDED RELEASE ORAL at 07:24

## 2023-01-01 RX ADMIN — INSULIN ASPART 1 UNITS: 100 INJECTION, SOLUTION INTRAVENOUS; SUBCUTANEOUS at 17:46

## 2023-01-01 RX ADMIN — HYDRALAZINE HYDROCHLORIDE 25 MG: 25 TABLET, FILM COATED ORAL at 16:45

## 2023-01-01 RX ADMIN — Medication 1 TABLET: at 08:24

## 2023-01-01 RX ADMIN — PANTOPRAZOLE SODIUM 40 MG: 40 TABLET, DELAYED RELEASE ORAL at 08:25

## 2023-01-01 RX ADMIN — ALLOPURINOL 200 MG: 100 TABLET ORAL at 10:52

## 2023-01-01 RX ADMIN — IRON SUCROSE 200 MG: 20 INJECTION, SOLUTION INTRAVENOUS at 17:07

## 2023-01-01 RX ADMIN — TAMSULOSIN HYDROCHLORIDE 0.8 MG: 0.4 CAPSULE ORAL at 20:43

## 2023-01-01 RX ADMIN — TORSEMIDE 100 MG: 100 TABLET ORAL at 20:09

## 2023-01-01 RX ADMIN — BUMETANIDE 2 MG/HR: 0.25 INJECTION INTRAMUSCULAR; INTRAVENOUS at 15:17

## 2023-01-01 RX ADMIN — ACETAMINOPHEN 650 MG: 325 TABLET, FILM COATED ORAL at 00:19

## 2023-01-01 RX ADMIN — B-COMPLEX W/ C & FOLIC ACID TAB 1 MG 1 TABLET: 1 TAB at 13:28

## 2023-01-01 RX ADMIN — POTASSIUM CHLORIDE 20 MEQ: 750 TABLET, EXTENDED RELEASE ORAL at 22:13

## 2023-01-01 RX ADMIN — MAGNESIUM OXIDE TAB 400 MG (241.3 MG ELEMENTAL MG) 400 MG: 400 (241.3 MG) TAB at 12:46

## 2023-01-01 RX ADMIN — Medication 400 MG: at 07:49

## 2023-01-01 RX ADMIN — MAGNESIUM OXIDE TAB 400 MG (241.3 MG ELEMENTAL MG) 400 MG: 400 (241.3 MG) TAB at 08:31

## 2023-01-01 RX ADMIN — LEVOTHYROXINE SODIUM 88 MCG: 88 TABLET ORAL at 07:47

## 2023-01-01 RX ADMIN — MAGNESIUM OXIDE TAB 400 MG (241.3 MG ELEMENTAL MG) 400 MG: 400 (241.3 MG) TAB at 12:14

## 2023-01-01 RX ADMIN — WARFARIN SODIUM 2 MG: 1 TABLET ORAL at 18:24

## 2023-01-01 RX ADMIN — LEVOTHYROXINE SODIUM 100 MCG: 100 TABLET ORAL at 08:24

## 2023-01-01 RX ADMIN — ALLOPURINOL 200 MG: 100 TABLET ORAL at 08:16

## 2023-01-01 RX ADMIN — TAMSULOSIN HYDROCHLORIDE 0.8 MG: 0.4 CAPSULE ORAL at 20:21

## 2023-01-01 RX ADMIN — MAGNESIUM OXIDE TAB 400 MG (241.3 MG ELEMENTAL MG) 400 MG: 400 (241.3 MG) TAB at 20:03

## 2023-01-01 RX ADMIN — Medication 2 MG/HR: at 20:22

## 2023-01-01 RX ADMIN — ACETAZOLAMIDE SODIUM 500 MG: 500 INJECTION, POWDER, LYOPHILIZED, FOR SOLUTION INTRAVENOUS at 12:53

## 2023-01-01 RX ADMIN — ISOSORBIDE MONONITRATE 90 MG: 60 TABLET, EXTENDED RELEASE ORAL at 08:13

## 2023-01-01 RX ADMIN — POTASSIUM CHLORIDE 40 MEQ: 750 TABLET, EXTENDED RELEASE ORAL at 11:26

## 2023-01-01 RX ADMIN — HYDRALAZINE HYDROCHLORIDE 25 MG: 25 TABLET, FILM COATED ORAL at 21:50

## 2023-01-01 RX ADMIN — MAGNESIUM OXIDE TAB 400 MG (241.3 MG ELEMENTAL MG) 400 MG: 400 (241.3 MG) TAB at 09:10

## 2023-01-01 RX ADMIN — PANTOPRAZOLE SODIUM 40 MG: 40 TABLET, DELAYED RELEASE ORAL at 09:40

## 2023-01-01 RX ADMIN — ALLOPURINOL 200 MG: 100 TABLET ORAL at 11:38

## 2023-01-01 RX ADMIN — PAROXETINE HYDROCHLORIDE 20 MG: 10 TABLET, FILM COATED ORAL at 08:13

## 2023-01-01 RX ADMIN — OXYMETAZOLINE HYDROCHLORIDE 3 SPRAY: 0.05 SPRAY NASAL at 18:46

## 2023-01-01 RX ADMIN — CIPROFLOXACIN 750 MG: 250 TABLET, FILM COATED ORAL at 08:39

## 2023-01-01 RX ADMIN — AMIODARONE HYDROCHLORIDE 200 MG: 200 TABLET ORAL at 08:24

## 2023-01-01 RX ADMIN — SPIRONOLACTONE 12.5 MG: 25 TABLET, FILM COATED ORAL at 08:41

## 2023-01-01 RX ADMIN — POTASSIUM CHLORIDE 100 MEQ: 1500 TABLET, EXTENDED RELEASE ORAL at 13:20

## 2023-01-01 RX ADMIN — SPIRONOLACTONE 25 MG: 25 TABLET, FILM COATED ORAL at 08:14

## 2023-01-01 RX ADMIN — Medication 12.5 MG: at 21:05

## 2023-01-01 RX ADMIN — HYDRALAZINE HYDROCHLORIDE 100 MG: 100 TABLET, FILM COATED ORAL at 20:20

## 2023-01-01 RX ADMIN — BUMETANIDE 2 MG/HR: 0.25 INJECTION INTRAMUSCULAR; INTRAVENOUS at 14:50

## 2023-01-01 RX ADMIN — DAPTOMYCIN 500 MG: 500 INJECTION, POWDER, LYOPHILIZED, FOR SOLUTION INTRAVENOUS at 17:20

## 2023-01-01 RX ADMIN — HYDRALAZINE HYDROCHLORIDE 100 MG: 100 TABLET, FILM COATED ORAL at 08:13

## 2023-01-01 RX ADMIN — Medication 1 CAPSULE: at 09:39

## 2023-01-01 RX ADMIN — HYDRALAZINE HYDROCHLORIDE 100 MG: 100 TABLET, FILM COATED ORAL at 13:43

## 2023-01-01 RX ADMIN — PAROXETINE 20 MG: 20 TABLET, FILM COATED ORAL at 08:09

## 2023-01-01 RX ADMIN — POTASSIUM CHLORIDE 20 MEQ: 29.8 INJECTION, SOLUTION INTRAVENOUS at 10:43

## 2023-01-01 RX ADMIN — ASPIRIN 81 MG: 81 TABLET ORAL at 08:30

## 2023-01-01 RX ADMIN — POTASSIUM CHLORIDE 60 MEQ: 1500 TABLET, EXTENDED RELEASE ORAL at 08:31

## 2023-01-01 RX ADMIN — CHLOROTHIAZIDE SODIUM 1000 MG: 500 INJECTION, POWDER, LYOPHILIZED, FOR SOLUTION INTRAVENOUS at 22:52

## 2023-01-01 RX ADMIN — FENTANYL CITRATE 50 MCG: 50 INJECTION, SOLUTION INTRAMUSCULAR; INTRAVENOUS at 09:55

## 2023-01-01 RX ADMIN — MAGNESIUM OXIDE TAB 400 MG (241.3 MG ELEMENTAL MG) 400 MG: 400 (241.3 MG) TAB at 22:42

## 2023-01-01 RX ADMIN — Medication 2 MG/HR: at 03:29

## 2023-01-01 RX ADMIN — MAGNESIUM OXIDE TAB 400 MG (240 MG ELEMENTAL MG) 400 MG: 400 (240 MG) TAB at 22:06

## 2023-01-01 RX ADMIN — FINASTERIDE 5 MG: 5 TABLET, FILM COATED ORAL at 08:49

## 2023-01-01 RX ADMIN — HYDRALAZINE HYDROCHLORIDE 50 MG: 50 TABLET, FILM COATED ORAL at 08:12

## 2023-01-01 RX ADMIN — DICLOFENAC SODIUM 4 G: 10 GEL TOPICAL at 15:00

## 2023-01-01 RX ADMIN — TORSEMIDE 100 MG: 100 TABLET ORAL at 20:22

## 2023-01-01 RX ADMIN — PANTOPRAZOLE SODIUM 40 MG: 40 TABLET, DELAYED RELEASE ORAL at 17:06

## 2023-01-01 RX ADMIN — POTASSIUM CHLORIDE 80 MEQ: 1500 TABLET, EXTENDED RELEASE ORAL at 08:38

## 2023-01-01 RX ADMIN — AMLODIPINE BESYLATE 10 MG: 10 TABLET ORAL at 08:21

## 2023-01-01 RX ADMIN — BUMETANIDE 8 MG: 0.25 INJECTION INTRAMUSCULAR; INTRAVENOUS at 11:16

## 2023-01-01 RX ADMIN — BUMETANIDE 8 MG: 0.25 INJECTION INTRAMUSCULAR; INTRAVENOUS at 23:54

## 2023-01-01 RX ADMIN — POTASSIUM CHLORIDE 80 MEQ: 1500 TABLET, EXTENDED RELEASE ORAL at 08:22

## 2023-01-01 RX ADMIN — LEVOTHYROXINE SODIUM 88 MCG: 88 TABLET ORAL at 08:03

## 2023-01-01 RX ADMIN — POTASSIUM CHLORIDE 40 MEQ: 1.5 POWDER, FOR SOLUTION ORAL at 02:42

## 2023-01-01 RX ADMIN — MAGNESIUM OXIDE TAB 400 MG (241.3 MG ELEMENTAL MG) 400 MG: 400 (241.3 MG) TAB at 08:17

## 2023-01-01 RX ADMIN — DICLOFENAC SODIUM 2 G: 10 GEL TOPICAL at 21:23

## 2023-01-01 RX ADMIN — ACETAMINOPHEN 650 MG: 325 TABLET ORAL at 20:41

## 2023-01-01 RX ADMIN — WARFARIN SODIUM 4 MG: 4 TABLET ORAL at 17:32

## 2023-01-01 RX ADMIN — AMIODARONE HYDROCHLORIDE 200 MG: 200 TABLET ORAL at 08:30

## 2023-01-01 RX ADMIN — EMPAGLIFLOZIN 10 MG: 10 TABLET, FILM COATED ORAL at 10:52

## 2023-01-01 RX ADMIN — PANTOPRAZOLE SODIUM 40 MG: 40 TABLET, DELAYED RELEASE ORAL at 08:20

## 2023-01-01 RX ADMIN — LIDOCAINE: 40 CREAM TOPICAL at 15:44

## 2023-01-01 RX ADMIN — INSULIN ASPART 1 UNITS: 100 INJECTION, SOLUTION INTRAVENOUS; SUBCUTANEOUS at 08:25

## 2023-01-01 RX ADMIN — AMLODIPINE BESYLATE 10 MG: 10 TABLET ORAL at 08:08

## 2023-01-01 RX ADMIN — ISOSORBIDE MONONITRATE 120 MG: 60 TABLET, EXTENDED RELEASE ORAL at 07:46

## 2023-01-01 RX ADMIN — ZOLPIDEM TARTRATE 5 MG: 5 TABLET ORAL at 21:19

## 2023-01-01 RX ADMIN — LEVOTHYROXINE SODIUM 100 MCG: 100 TABLET ORAL at 08:23

## 2023-01-01 RX ADMIN — Medication 400 MG: at 11:45

## 2023-01-01 RX ADMIN — ACETAMINOPHEN 650 MG: 325 TABLET ORAL at 17:00

## 2023-01-01 RX ADMIN — MAGNESIUM OXIDE TAB 400 MG (241.3 MG ELEMENTAL MG) 400 MG: 400 (241.3 MG) TAB at 09:20

## 2023-01-01 RX ADMIN — Medication 2 MG/HR: at 13:44

## 2023-01-01 RX ADMIN — PANTOPRAZOLE SODIUM 40 MG: 40 TABLET, DELAYED RELEASE ORAL at 16:30

## 2023-01-01 RX ADMIN — CEFEPIME HYDROCHLORIDE 1 G: 1 INJECTION, POWDER, FOR SOLUTION INTRAMUSCULAR; INTRAVENOUS at 05:00

## 2023-01-01 RX ADMIN — FLUOXETINE 30 MG: 20 CAPSULE ORAL at 08:47

## 2023-01-01 RX ADMIN — Medication 2 MG/HR: at 23:55

## 2023-01-01 RX ADMIN — FINASTERIDE 5 MG: 5 TABLET, FILM COATED ORAL at 08:12

## 2023-01-01 RX ADMIN — HYDROCHLOROTHIAZIDE 100 MG: 25 TABLET ORAL at 09:01

## 2023-01-01 RX ADMIN — Medication 2 MG/HR: at 13:39

## 2023-01-01 RX ADMIN — AMIODARONE HYDROCHLORIDE 200 MG: 200 TABLET ORAL at 08:08

## 2023-01-01 RX ADMIN — METHOCARBAMOL 500 MG: 500 TABLET ORAL at 19:51

## 2023-01-01 RX ADMIN — POTASSIUM CHLORIDE 20 MEQ: 1.5 POWDER, FOR SOLUTION ORAL at 11:12

## 2023-01-01 RX ADMIN — Medication 400 MG: at 11:57

## 2023-01-01 RX ADMIN — PANTOPRAZOLE SODIUM 40 MG: 40 TABLET, DELAYED RELEASE ORAL at 16:24

## 2023-01-01 RX ADMIN — ALLOPURINOL 200 MG: 100 TABLET ORAL at 08:37

## 2023-01-01 RX ADMIN — BUMETANIDE 8 MG: 0.25 INJECTION INTRAMUSCULAR; INTRAVENOUS at 18:14

## 2023-01-01 RX ADMIN — ALLOPURINOL 200 MG: 100 TABLET ORAL at 08:46

## 2023-01-01 RX ADMIN — DICLOFENAC SODIUM 2 G: 10 GEL TOPICAL at 17:06

## 2023-01-01 RX ADMIN — POTASSIUM CHLORIDE 10 MEQ: 750 TABLET, EXTENDED RELEASE ORAL at 22:10

## 2023-01-01 RX ADMIN — BUMETANIDE 2 MG/HR: 0.25 INJECTION INTRAMUSCULAR; INTRAVENOUS at 09:43

## 2023-01-01 RX ADMIN — HYDROCHLOROTHIAZIDE 100 MG: 25 TABLET ORAL at 08:22

## 2023-01-01 RX ADMIN — DAPTOMYCIN 500 MG: 500 INJECTION, POWDER, LYOPHILIZED, FOR SOLUTION INTRAVENOUS at 13:59

## 2023-01-01 RX ADMIN — POTASSIUM CHLORIDE 20 MEQ: 1500 TABLET, EXTENDED RELEASE ORAL at 19:53

## 2023-01-01 RX ADMIN — DAPAGLIFLOZIN 10 MG: 10 TABLET, FILM COATED ORAL at 11:39

## 2023-01-01 RX ADMIN — PAROXETINE HYDROCHLORIDE 20 MG: 10 TABLET, FILM COATED ORAL at 08:05

## 2023-01-01 RX ADMIN — Medication 400 MG: at 08:56

## 2023-01-01 RX ADMIN — POTASSIUM CHLORIDE 20 MEQ: 29.8 INJECTION, SOLUTION INTRAVENOUS at 10:07

## 2023-01-01 RX ADMIN — DOBUTAMINE HYDROCHLORIDE 2.5 MCG/KG/MIN: 200 INJECTION INTRAVENOUS at 02:11

## 2023-01-01 RX ADMIN — ALLOPURINOL 200 MG: 100 TABLET ORAL at 07:51

## 2023-01-01 RX ADMIN — METHOCARBAMOL 500 MG: 500 TABLET ORAL at 20:12

## 2023-01-01 RX ADMIN — LEVOTHYROXINE SODIUM 100 MCG: 100 TABLET ORAL at 08:12

## 2023-01-01 RX ADMIN — Medication 400 MG: at 21:54

## 2023-01-01 RX ADMIN — POTASSIUM CHLORIDE 40 MEQ: 750 TABLET, EXTENDED RELEASE ORAL at 09:14

## 2023-01-01 RX ADMIN — DAPTOMYCIN 500 MG: 500 INJECTION, POWDER, LYOPHILIZED, FOR SOLUTION INTRAVENOUS at 17:14

## 2023-01-01 RX ADMIN — Medication 400 MG: at 08:22

## 2023-01-01 RX ADMIN — TORSEMIDE 100 MG: 100 TABLET ORAL at 08:13

## 2023-01-01 RX ADMIN — IRON SUCROSE 200 MG: 20 INJECTION, SOLUTION INTRAVENOUS at 19:27

## 2023-01-01 RX ADMIN — MAGNESIUM OXIDE TAB 400 MG (241.3 MG ELEMENTAL MG) 400 MG: 400 (241.3 MG) TAB at 20:17

## 2023-01-01 RX ADMIN — FINASTERIDE 5 MG: 5 TABLET, FILM COATED ORAL at 08:23

## 2023-01-01 RX ADMIN — FINASTERIDE 5 MG: 5 TABLET, FILM COATED ORAL at 10:39

## 2023-01-01 RX ADMIN — Medication 1 CAPSULE: at 11:38

## 2023-01-01 RX ADMIN — POTASSIUM CHLORIDE 10 MEQ: 7.46 INJECTION, SOLUTION INTRAVENOUS at 17:03

## 2023-01-01 RX ADMIN — ACETAMINOPHEN 975 MG: 325 TABLET, FILM COATED ORAL at 09:02

## 2023-01-01 RX ADMIN — ACETAMINOPHEN 975 MG: 325 TABLET, FILM COATED ORAL at 21:49

## 2023-01-01 RX ADMIN — POTASSIUM CHLORIDE 20 MEQ: 750 TABLET, EXTENDED RELEASE ORAL at 18:08

## 2023-01-01 RX ADMIN — TORSEMIDE 100 MG: 100 TABLET ORAL at 13:30

## 2023-01-01 RX ADMIN — PANTOPRAZOLE SODIUM 40 MG: 40 TABLET, DELAYED RELEASE ORAL at 07:24

## 2023-01-01 RX ADMIN — FINASTERIDE 5 MG: 5 TABLET, FILM COATED ORAL at 11:03

## 2023-01-01 RX ADMIN — VANCOMYCIN HYDROCHLORIDE 125 MG: KIT at 17:07

## 2023-01-01 RX ADMIN — DICLOFENAC SODIUM 4 G: 10 GEL TOPICAL at 07:35

## 2023-01-01 RX ADMIN — HYDRALAZINE HYDROCHLORIDE 100 MG: 100 TABLET, FILM COATED ORAL at 20:27

## 2023-01-01 RX ADMIN — WARFARIN SODIUM 3 MG: 3 TABLET ORAL at 17:44

## 2023-01-01 RX ADMIN — DAPAGLIFLOZIN 5 MG: 5 TABLET, FILM COATED ORAL at 07:33

## 2023-01-01 RX ADMIN — HYDRALAZINE HYDROCHLORIDE 50 MG: 50 TABLET, FILM COATED ORAL at 08:22

## 2023-01-01 RX ADMIN — ALLOPURINOL 200 MG: 100 TABLET ORAL at 08:19

## 2023-01-01 RX ADMIN — POTASSIUM CHLORIDE 10 MEQ: 750 TABLET, EXTENDED RELEASE ORAL at 04:06

## 2023-01-01 RX ADMIN — ASPIRIN 81 MG: 81 TABLET ORAL at 07:34

## 2023-01-01 RX ADMIN — BUMETANIDE 8 MG: 0.25 INJECTION INTRAMUSCULAR; INTRAVENOUS at 23:30

## 2023-01-01 RX ADMIN — CIPROFLOXACIN HYDROCHLORIDE 500 MG: 500 TABLET, FILM COATED ORAL at 10:38

## 2023-01-01 RX ADMIN — MAGNESIUM OXIDE TAB 400 MG (240 MG ELEMENTAL MG) 400 MG: 400 (240 MG) TAB at 07:34

## 2023-01-01 RX ADMIN — CIPROFLOXACIN HYDROCHLORIDE 500 MG: 500 TABLET, FILM COATED ORAL at 20:23

## 2023-01-01 RX ADMIN — PANTOPRAZOLE SODIUM 40 MG: 40 TABLET, DELAYED RELEASE ORAL at 08:03

## 2023-01-01 RX ADMIN — METHOCARBAMOL 500 MG: 500 TABLET ORAL at 14:09

## 2023-01-01 RX ADMIN — EMPAGLIFLOZIN 10 MG: 10 TABLET, FILM COATED ORAL at 08:40

## 2023-01-01 RX ADMIN — EMPAGLIFLOZIN 10 MG: 10 TABLET, FILM COATED ORAL at 10:17

## 2023-01-01 RX ADMIN — HYDRALAZINE HYDROCHLORIDE 50 MG: 50 TABLET, FILM COATED ORAL at 14:55

## 2023-01-01 RX ADMIN — MEROPENEM 500 MG: 500 INJECTION, POWDER, FOR SOLUTION INTRAVENOUS at 21:07

## 2023-01-01 RX ADMIN — POTASSIUM CHLORIDE 20 MEQ: 29.8 INJECTION, SOLUTION INTRAVENOUS at 08:17

## 2023-01-01 RX ADMIN — CIPROFLOXACIN HYDROCHLORIDE 500 MG: 500 TABLET, FILM COATED ORAL at 20:20

## 2023-01-01 RX ADMIN — POTASSIUM CHLORIDE 60 MEQ: 1500 TABLET, EXTENDED RELEASE ORAL at 08:56

## 2023-01-01 RX ADMIN — CIPROFLOXACIN HYDROCHLORIDE 500 MG: 500 TABLET, FILM COATED ORAL at 08:08

## 2023-01-01 RX ADMIN — HYDRALAZINE HYDROCHLORIDE 25 MG: 25 TABLET, FILM COATED ORAL at 21:33

## 2023-01-01 RX ADMIN — BUMETANIDE 8 MG: 0.25 INJECTION INTRAMUSCULAR; INTRAVENOUS at 06:02

## 2023-01-01 RX ADMIN — POTASSIUM CHLORIDE 100 MEQ: 1500 TABLET, EXTENDED RELEASE ORAL at 09:01

## 2023-01-01 RX ADMIN — QUETIAPINE FUMARATE 25 MG: 25 TABLET ORAL at 22:26

## 2023-01-01 RX ADMIN — TAMSULOSIN HYDROCHLORIDE 0.8 MG: 0.4 CAPSULE ORAL at 20:29

## 2023-01-01 RX ADMIN — Medication 2 MG/HR: at 02:12

## 2023-01-01 RX ADMIN — DOBUTAMINE HYDROCHLORIDE 2.5 MCG/KG/MIN: 200 INJECTION INTRAVENOUS at 14:25

## 2023-01-01 RX ADMIN — WARFARIN SODIUM 6 MG: 3 TABLET ORAL at 19:02

## 2023-01-01 RX ADMIN — METHOCARBAMOL 500 MG: 500 TABLET ORAL at 20:26

## 2023-01-01 RX ADMIN — DICLOFENAC SODIUM 4 G: 10 GEL TOPICAL at 19:55

## 2023-01-01 RX ADMIN — PAROXETINE HYDROCHLORIDE 20 MG: 10 TABLET, FILM COATED ORAL at 11:03

## 2023-01-01 RX ADMIN — EMPAGLIFLOZIN 10 MG: 10 TABLET, FILM COATED ORAL at 08:13

## 2023-01-01 RX ADMIN — Medication 400 MG: at 08:26

## 2023-01-01 RX ADMIN — TAMSULOSIN HYDROCHLORIDE 0.8 MG: 0.4 CAPSULE ORAL at 20:38

## 2023-01-01 RX ADMIN — METHOCARBAMOL 500 MG: 500 TABLET ORAL at 20:40

## 2023-01-01 RX ADMIN — HYDRALAZINE HYDROCHLORIDE 50 MG: 50 TABLET, FILM COATED ORAL at 20:57

## 2023-01-01 RX ADMIN — POTASSIUM CHLORIDE 80 MEQ: 1500 TABLET, EXTENDED RELEASE ORAL at 21:52

## 2023-01-01 RX ADMIN — BUMETANIDE 4 MG: 0.25 INJECTION INTRAMUSCULAR; INTRAVENOUS at 11:17

## 2023-01-01 RX ADMIN — FERROUS SULFATE TAB 325 MG (65 MG ELEMENTAL FE) 325 MG: 325 (65 FE) TAB at 08:13

## 2023-01-01 RX ADMIN — AMIODARONE HYDROCHLORIDE 200 MG: 200 TABLET ORAL at 08:01

## 2023-01-01 RX ADMIN — MAGNESIUM OXIDE TAB 400 MG (240 MG ELEMENTAL MG) 400 MG: 400 (240 MG) TAB at 22:16

## 2023-01-01 RX ADMIN — POTASSIUM CHLORIDE 40 MEQ: 750 TABLET, EXTENDED RELEASE ORAL at 10:06

## 2023-01-01 RX ADMIN — AMIODARONE HYDROCHLORIDE 200 MG: 200 TABLET ORAL at 07:33

## 2023-01-01 RX ADMIN — B-COMPLEX W/ C & FOLIC ACID TAB 1 MG 1 TABLET: 1 TAB at 07:37

## 2023-01-01 RX ADMIN — PANTOPRAZOLE SODIUM 40 MG: 40 TABLET, DELAYED RELEASE ORAL at 08:12

## 2023-01-01 RX ADMIN — POTASSIUM CHLORIDE 20 MEQ: 750 TABLET, EXTENDED RELEASE ORAL at 17:32

## 2023-01-01 RX ADMIN — BUMETANIDE 2 MG/HR: 0.25 INJECTION INTRAMUSCULAR; INTRAVENOUS at 00:03

## 2023-01-01 RX ADMIN — Medication 2 MG/HR: at 09:35

## 2023-01-01 RX ADMIN — MAGNESIUM OXIDE TAB 400 MG (241.3 MG ELEMENTAL MG) 400 MG: 400 (241.3 MG) TAB at 13:15

## 2023-01-01 RX ADMIN — LEVOTHYROXINE SODIUM 100 MCG: 100 TABLET ORAL at 08:30

## 2023-01-01 RX ADMIN — WHITE PETROLATUM: 1.75 OINTMENT TOPICAL at 08:29

## 2023-01-01 RX ADMIN — BUMETANIDE 2 MG/HR: 0.25 INJECTION INTRAMUSCULAR; INTRAVENOUS at 12:27

## 2023-01-01 RX ADMIN — CEFEPIME HYDROCHLORIDE 1 G: 1 INJECTION, POWDER, FOR SOLUTION INTRAMUSCULAR; INTRAVENOUS at 04:01

## 2023-01-01 RX ADMIN — HYDRALAZINE HYDROCHLORIDE 25 MG: 25 TABLET, FILM COATED ORAL at 21:24

## 2023-01-01 RX ADMIN — ALLOPURINOL 200 MG: 100 TABLET ORAL at 09:21

## 2023-01-01 RX ADMIN — DAPTOMYCIN 500 MG: 500 INJECTION, POWDER, LYOPHILIZED, FOR SOLUTION INTRAVENOUS at 09:59

## 2023-01-01 RX ADMIN — AMIODARONE HYDROCHLORIDE 200 MG: 200 TABLET ORAL at 08:04

## 2023-01-01 RX ADMIN — BUMETANIDE 3 MG: 0.25 INJECTION INTRAMUSCULAR; INTRAVENOUS at 10:07

## 2023-01-01 RX ADMIN — AMLODIPINE BESYLATE 10 MG: 10 TABLET ORAL at 08:15

## 2023-01-01 RX ADMIN — FLUOXETINE 30 MG: 20 CAPSULE ORAL at 07:51

## 2023-01-01 RX ADMIN — ALLOPURINOL 200 MG: 100 TABLET ORAL at 08:56

## 2023-01-01 RX ADMIN — MAGNESIUM OXIDE TAB 400 MG (241.3 MG ELEMENTAL MG) 400 MG: 400 (241.3 MG) TAB at 17:58

## 2023-01-01 RX ADMIN — HYDRALAZINE HYDROCHLORIDE 25 MG: 25 TABLET, FILM COATED ORAL at 06:27

## 2023-01-01 RX ADMIN — POTASSIUM CHLORIDE 80 MEQ: 1500 TABLET, EXTENDED RELEASE ORAL at 19:45

## 2023-01-01 RX ADMIN — MEROPENEM 500 MG: 500 INJECTION, POWDER, FOR SOLUTION INTRAVENOUS at 21:28

## 2023-01-01 RX ADMIN — CHLOROTHIAZIDE SODIUM 500 MG: 500 INJECTION, POWDER, LYOPHILIZED, FOR SOLUTION INTRAVENOUS at 10:14

## 2023-01-01 RX ADMIN — ZOLPIDEM TARTRATE 5 MG: 5 TABLET ORAL at 21:42

## 2023-01-01 RX ADMIN — CIPROFLOXACIN HYDROCHLORIDE 500 MG: 500 TABLET, FILM COATED ORAL at 07:41

## 2023-01-01 RX ADMIN — WARFARIN SODIUM 4 MG: 4 TABLET ORAL at 18:14

## 2023-01-01 RX ADMIN — WARFARIN SODIUM 5 MG: 5 TABLET ORAL at 17:57

## 2023-01-01 RX ADMIN — B-COMPLEX W/ C & FOLIC ACID TAB 1 MG 1 TABLET: 1 TAB at 07:40

## 2023-01-01 RX ADMIN — B-COMPLEX W/ C & FOLIC ACID TAB 1 MG 1 TABLET: 1 TAB at 08:40

## 2023-01-01 RX ADMIN — TAMSULOSIN HYDROCHLORIDE 0.8 MG: 0.4 CAPSULE ORAL at 20:03

## 2023-01-01 RX ADMIN — WARFARIN SODIUM 2 MG: 2 TABLET ORAL at 18:06

## 2023-01-01 RX ADMIN — CIPROFLOXACIN 750 MG: 250 TABLET, FILM COATED ORAL at 08:09

## 2023-01-01 RX ADMIN — CIPROFLOXACIN HYDROCHLORIDE 500 MG: 500 TABLET, FILM COATED ORAL at 20:11

## 2023-01-01 RX ADMIN — WARFARIN SODIUM 4 MG: 4 TABLET ORAL at 19:46

## 2023-01-01 RX ADMIN — POTASSIUM CHLORIDE 20 MEQ: 29.8 INJECTION, SOLUTION INTRAVENOUS at 09:30

## 2023-01-01 RX ADMIN — PANTOPRAZOLE SODIUM 40 MG: 40 INJECTION, POWDER, FOR SOLUTION INTRAVENOUS at 08:15

## 2023-01-01 RX ADMIN — MAGNESIUM OXIDE TAB 400 MG (241.3 MG ELEMENTAL MG) 400 MG: 400 (241.3 MG) TAB at 10:01

## 2023-01-01 RX ADMIN — TAMSULOSIN HYDROCHLORIDE 0.8 MG: 0.4 CAPSULE ORAL at 08:47

## 2023-01-01 RX ADMIN — CHLOROTHIAZIDE SODIUM 1000 MG: 500 INJECTION, POWDER, LYOPHILIZED, FOR SOLUTION INTRAVENOUS at 12:23

## 2023-01-01 RX ADMIN — ALLOPURINOL 200 MG: 100 TABLET ORAL at 08:17

## 2023-01-01 RX ADMIN — PAROXETINE HYDROCHLORIDE 10 MG: 10 TABLET, FILM COATED ORAL at 15:04

## 2023-01-01 RX ADMIN — QUETIAPINE FUMARATE 25 MG: 25 TABLET ORAL at 21:24

## 2023-01-01 RX ADMIN — DAPAGLIFLOZIN 10 MG: 10 TABLET, FILM COATED ORAL at 08:15

## 2023-01-01 RX ADMIN — PANTOPRAZOLE SODIUM 40 MG: 40 TABLET, DELAYED RELEASE ORAL at 17:33

## 2023-01-01 RX ADMIN — AMIODARONE HYDROCHLORIDE 200 MG: 200 TABLET ORAL at 07:34

## 2023-01-01 RX ADMIN — DOBUTAMINE HYDROCHLORIDE 5 MCG/KG/MIN: 200 INJECTION INTRAVENOUS at 13:44

## 2023-01-01 RX ADMIN — POTASSIUM CHLORIDE 80 MEQ: 1500 TABLET, EXTENDED RELEASE ORAL at 08:30

## 2023-01-01 RX ADMIN — ALLOPURINOL 200 MG: 100 TABLET ORAL at 08:12

## 2023-01-01 RX ADMIN — AMIODARONE HYDROCHLORIDE 200 MG: 200 TABLET ORAL at 08:21

## 2023-01-01 RX ADMIN — FINASTERIDE 5 MG: 5 TABLET, FILM COATED ORAL at 07:34

## 2023-01-01 RX ADMIN — ISOSORBIDE MONONITRATE 90 MG: 60 TABLET, EXTENDED RELEASE ORAL at 07:34

## 2023-01-01 RX ADMIN — DICLOFENAC SODIUM 4 G: 10 GEL TOPICAL at 08:22

## 2023-01-01 RX ADMIN — MAGNESIUM OXIDE TAB 400 MG (241.3 MG ELEMENTAL MG) 400 MG: 400 (241.3 MG) TAB at 21:06

## 2023-01-01 RX ADMIN — POTASSIUM CHLORIDE 100 MEQ: 1500 TABLET, EXTENDED RELEASE ORAL at 12:01

## 2023-01-01 RX ADMIN — POTASSIUM CHLORIDE 100 MEQ: 1500 TABLET, EXTENDED RELEASE ORAL at 20:23

## 2023-01-01 RX ADMIN — BUMETANIDE 2 MG/HR: 0.25 INJECTION, SOLUTION INTRAMUSCULAR; INTRAVENOUS at 07:41

## 2023-01-01 RX ADMIN — FERROUS SULFATE TAB 325 MG (65 MG ELEMENTAL FE) 325 MG: 325 (65 FE) TAB at 08:09

## 2023-01-01 RX ADMIN — INSULIN ASPART 1 UNITS: 100 INJECTION, SOLUTION INTRAVENOUS; SUBCUTANEOUS at 18:24

## 2023-01-01 RX ADMIN — TAMSULOSIN HYDROCHLORIDE 0.8 MG: 0.4 CAPSULE ORAL at 20:02

## 2023-01-01 RX ADMIN — INSULIN ASPART 1 UNITS: 100 INJECTION, SOLUTION INTRAVENOUS; SUBCUTANEOUS at 16:49

## 2023-01-01 RX ADMIN — POTASSIUM CHLORIDE 10 MEQ: 750 TABLET, EXTENDED RELEASE ORAL at 22:25

## 2023-01-01 RX ADMIN — POTASSIUM CHLORIDE 10 MEQ: 7.46 INJECTION, SOLUTION INTRAVENOUS at 14:30

## 2023-01-01 RX ADMIN — ASPIRIN 81 MG: 81 TABLET ORAL at 07:46

## 2023-01-01 RX ADMIN — DICLOFENAC SODIUM 2 G: 10 GEL TOPICAL at 12:46

## 2023-01-01 RX ADMIN — AMIODARONE HYDROCHLORIDE 200 MG: 200 TABLET ORAL at 08:17

## 2023-01-01 RX ADMIN — DOBUTAMINE HYDROCHLORIDE 5 MCG/KG/MIN: 200 INJECTION INTRAVENOUS at 16:30

## 2023-01-01 RX ADMIN — ASPIRIN 81 MG: 81 TABLET ORAL at 08:46

## 2023-01-01 RX ADMIN — CHLOROTHIAZIDE SODIUM 1000 MG: 500 INJECTION, POWDER, LYOPHILIZED, FOR SOLUTION INTRAVENOUS at 09:04

## 2023-01-01 RX ADMIN — AMIODARONE HYDROCHLORIDE 200 MG: 200 TABLET ORAL at 09:11

## 2023-01-01 RX ADMIN — Medication 2 MG/HR: at 21:28

## 2023-01-01 RX ADMIN — POTASSIUM CHLORIDE 60 MEQ: 1500 TABLET, EXTENDED RELEASE ORAL at 07:37

## 2023-01-01 RX ADMIN — BUMETANIDE 2 MG/HR: 0.25 INJECTION INTRAMUSCULAR; INTRAVENOUS at 13:02

## 2023-01-01 RX ADMIN — FINASTERIDE 5 MG: 5 TABLET, FILM COATED ORAL at 09:20

## 2023-01-01 RX ADMIN — LOPERAMIDE HYDROCHLORIDE 2 MG: 2 CAPSULE ORAL at 08:44

## 2023-01-01 RX ADMIN — B-COMPLEX W/ C & FOLIC ACID TAB 1 MG 1 TABLET: 1 TAB at 08:07

## 2023-01-01 RX ADMIN — TORSEMIDE 100 MG: 100 TABLET ORAL at 08:14

## 2023-01-01 RX ADMIN — HYDRALAZINE HYDROCHLORIDE 50 MG: 50 TABLET, FILM COATED ORAL at 15:26

## 2023-01-01 RX ADMIN — FERROUS SULFATE TAB 325 MG (65 MG ELEMENTAL FE) 325 MG: 325 (65 FE) TAB at 09:41

## 2023-01-01 RX ADMIN — MAGNESIUM OXIDE TAB 400 MG (241.3 MG ELEMENTAL MG) 400 MG: 400 (241.3 MG) TAB at 09:01

## 2023-01-01 RX ADMIN — INSULIN ASPART 2 UNITS: 100 INJECTION, SOLUTION INTRAVENOUS; SUBCUTANEOUS at 08:15

## 2023-01-01 RX ADMIN — HYDRALAZINE HYDROCHLORIDE 25 MG: 25 TABLET, FILM COATED ORAL at 05:01

## 2023-01-01 RX ADMIN — BUMETANIDE 2 MG/HR: 0.25 INJECTION INTRAMUSCULAR; INTRAVENOUS at 21:03

## 2023-01-01 RX ADMIN — CEFEPIME HYDROCHLORIDE 1 G: 1 INJECTION, POWDER, FOR SOLUTION INTRAMUSCULAR; INTRAVENOUS at 15:05

## 2023-01-01 RX ADMIN — ZOLPIDEM TARTRATE 5 MG: 5 TABLET ORAL at 21:52

## 2023-01-01 RX ADMIN — BUMETANIDE 5 MG: 0.25 INJECTION INTRAMUSCULAR; INTRAVENOUS at 15:20

## 2023-01-01 RX ADMIN — TORSEMIDE 100 MG: 100 TABLET ORAL at 08:15

## 2023-01-01 RX ADMIN — HYDRALAZINE HYDROCHLORIDE 100 MG: 100 TABLET ORAL at 19:55

## 2023-01-01 RX ADMIN — ZOLPIDEM TARTRATE 5 MG: 5 TABLET ORAL at 21:46

## 2023-01-01 RX ADMIN — HYDRALAZINE HYDROCHLORIDE 50 MG: 50 TABLET, FILM COATED ORAL at 13:30

## 2023-01-01 RX ADMIN — PANTOPRAZOLE SODIUM 40 MG: 40 TABLET, DELAYED RELEASE ORAL at 07:34

## 2023-01-01 RX ADMIN — MAGNESIUM SULFATE HEPTAHYDRATE 3 G: 500 INJECTION, SOLUTION INTRAMUSCULAR; INTRAVENOUS at 01:45

## 2023-01-01 RX ADMIN — AMIODARONE HYDROCHLORIDE 200 MG: 200 TABLET ORAL at 08:51

## 2023-01-01 RX ADMIN — POTASSIUM CHLORIDE 100 MEQ: 1500 TABLET, EXTENDED RELEASE ORAL at 21:06

## 2023-01-01 RX ADMIN — WARFARIN SODIUM 2 MG: 1 TABLET ORAL at 17:34

## 2023-01-01 RX ADMIN — Medication 1 TABLET: at 08:14

## 2023-01-01 RX ADMIN — CHLOROTHIAZIDE SODIUM 1000 MG: 500 INJECTION, POWDER, LYOPHILIZED, FOR SOLUTION INTRAVENOUS at 09:33

## 2023-01-01 RX ADMIN — HYDRALAZINE HYDROCHLORIDE 100 MG: 100 TABLET, FILM COATED ORAL at 13:28

## 2023-01-01 RX ADMIN — VANCOMYCIN HYDROCHLORIDE 125 MG: KIT at 21:48

## 2023-01-01 RX ADMIN — MAGNESIUM OXIDE TAB 400 MG (241.3 MG ELEMENTAL MG) 400 MG: 400 (241.3 MG) TAB at 15:01

## 2023-01-01 RX ADMIN — MAGNESIUM SULFATE HEPTAHYDRATE 2 G: 40 INJECTION, SOLUTION INTRAVENOUS at 11:27

## 2023-01-01 RX ADMIN — CHLOROTHIAZIDE SODIUM 500 MG: 500 INJECTION, POWDER, LYOPHILIZED, FOR SOLUTION INTRAVENOUS at 09:20

## 2023-01-01 RX ADMIN — MAGNESIUM OXIDE TAB 400 MG (241.3 MG ELEMENTAL MG) 400 MG: 400 (241.3 MG) TAB at 13:43

## 2023-01-01 RX ADMIN — Medication 1 CAPSULE: at 20:05

## 2023-01-01 RX ADMIN — HYDRALAZINE HYDROCHLORIDE 25 MG: 25 TABLET, FILM COATED ORAL at 22:06

## 2023-01-01 RX ADMIN — HYDRALAZINE HYDROCHLORIDE 50 MG: 50 TABLET, FILM COATED ORAL at 08:26

## 2023-01-01 RX ADMIN — PAROXETINE HYDROCHLORIDE 10 MG: 10 TABLET, FILM COATED ORAL at 09:20

## 2023-01-01 RX ADMIN — PANTOPRAZOLE SODIUM 40 MG: 40 TABLET, DELAYED RELEASE ORAL at 08:37

## 2023-01-01 RX ADMIN — BUMETANIDE 6 MG: 0.25 INJECTION INTRAMUSCULAR; INTRAVENOUS at 12:05

## 2023-01-01 RX ADMIN — FINASTERIDE 5 MG: 5 TABLET, FILM COATED ORAL at 07:41

## 2023-01-01 RX ADMIN — BUMETANIDE 8 MG: 0.25 INJECTION INTRAMUSCULAR; INTRAVENOUS at 11:35

## 2023-01-01 RX ADMIN — HYDROMORPHONE HYDROCHLORIDE 1 MG: 2 TABLET ORAL at 06:38

## 2023-01-01 RX ADMIN — CHLOROTHIAZIDE SODIUM 1000 MG: 500 INJECTION, POWDER, LYOPHILIZED, FOR SOLUTION INTRAVENOUS at 21:33

## 2023-01-01 RX ADMIN — LEVOTHYROXINE SODIUM 100 MCG: 100 TABLET ORAL at 08:14

## 2023-01-01 RX ADMIN — PAROXETINE HYDROCHLORIDE 10 MG: 10 TABLET, FILM COATED ORAL at 08:37

## 2023-01-01 RX ADMIN — Medication 400 MG: at 14:29

## 2023-01-01 RX ADMIN — AMIODARONE HYDROCHLORIDE 200 MG: 200 TABLET ORAL at 08:16

## 2023-01-01 RX ADMIN — MAGNESIUM OXIDE TAB 400 MG (241.3 MG ELEMENTAL MG) 400 MG: 400 (241.3 MG) TAB at 11:38

## 2023-01-01 RX ADMIN — TAMSULOSIN HYDROCHLORIDE 0.8 MG: 0.4 CAPSULE ORAL at 20:31

## 2023-01-01 RX ADMIN — PAROXETINE HYDROCHLORIDE 10 MG: 10 TABLET, FILM COATED ORAL at 09:39

## 2023-01-01 RX ADMIN — VANCOMYCIN HYDROCHLORIDE 125 MG: KIT at 15:22

## 2023-01-01 RX ADMIN — CILOSTAZOL 100 MG: 100 TABLET ORAL at 08:14

## 2023-01-01 RX ADMIN — Medication 12.5 MG: at 20:28

## 2023-01-01 RX ADMIN — INSULIN ASPART 1 UNITS: 100 INJECTION, SOLUTION INTRAVENOUS; SUBCUTANEOUS at 12:32

## 2023-01-01 RX ADMIN — DICLOFENAC SODIUM 4 G: 10 GEL TOPICAL at 12:05

## 2023-01-01 RX ADMIN — HYDRALAZINE HYDROCHLORIDE 25 MG: 25 TABLET, FILM COATED ORAL at 06:17

## 2023-01-01 RX ADMIN — PANTOPRAZOLE SODIUM 40 MG: 40 TABLET, DELAYED RELEASE ORAL at 09:06

## 2023-01-01 RX ADMIN — MAGNESIUM OXIDE TAB 400 MG (241.3 MG ELEMENTAL MG) 400 MG: 400 (241.3 MG) TAB at 09:39

## 2023-01-01 RX ADMIN — MAGNESIUM OXIDE TAB 400 MG (241.3 MG ELEMENTAL MG) 400 MG: 400 (241.3 MG) TAB at 14:08

## 2023-01-01 RX ADMIN — PANTOPRAZOLE SODIUM 40 MG: 40 TABLET, DELAYED RELEASE ORAL at 17:39

## 2023-01-01 RX ADMIN — CHLOROTHIAZIDE SODIUM 1000 MG: 500 INJECTION, POWDER, LYOPHILIZED, FOR SOLUTION INTRAVENOUS at 17:48

## 2023-01-01 RX ADMIN — MAGNESIUM OXIDE TAB 400 MG (241.3 MG ELEMENTAL MG) 400 MG: 400 (241.3 MG) TAB at 08:03

## 2023-01-01 RX ADMIN — POTASSIUM CHLORIDE 20 MEQ: 29.8 INJECTION, SOLUTION INTRAVENOUS at 14:46

## 2023-01-01 RX ADMIN — BUMETANIDE 8 MG: 0.25 INJECTION INTRAMUSCULAR; INTRAVENOUS at 00:08

## 2023-01-01 RX ADMIN — ISOSORBIDE MONONITRATE 90 MG: 60 TABLET, EXTENDED RELEASE ORAL at 07:33

## 2023-01-01 RX ADMIN — AMLODIPINE BESYLATE 10 MG: 10 TABLET ORAL at 08:22

## 2023-01-01 RX ADMIN — AMIODARONE HYDROCHLORIDE 200 MG: 200 TABLET ORAL at 08:19

## 2023-01-01 RX ADMIN — LEVOTHYROXINE SODIUM 88 MCG: 88 TABLET ORAL at 08:48

## 2023-01-01 RX ADMIN — MAGNESIUM OXIDE TAB 400 MG (241.3 MG ELEMENTAL MG) 400 MG: 400 (241.3 MG) TAB at 13:53

## 2023-01-01 RX ADMIN — DOBUTAMINE HYDROCHLORIDE 5 MCG/KG/MIN: 200 INJECTION INTRAVENOUS at 11:38

## 2023-01-01 RX ADMIN — ZOLPIDEM TARTRATE 5 MG: 5 TABLET ORAL at 23:25

## 2023-01-01 RX ADMIN — TRAMADOL HYDROCHLORIDE 50 MG: 50 TABLET, COATED ORAL at 20:16

## 2023-01-01 RX ADMIN — LEVOTHYROXINE SODIUM 88 MCG: 88 TABLET ORAL at 08:15

## 2023-01-01 RX ADMIN — Medication 2 MG/HR: at 23:49

## 2023-01-01 RX ADMIN — IRON SUCROSE 300 MG: 20 INJECTION, SOLUTION INTRAVENOUS at 09:36

## 2023-01-01 RX ADMIN — METHOCARBAMOL 500 MG: 500 TABLET ORAL at 13:45

## 2023-01-01 RX ADMIN — CEFEPIME HYDROCHLORIDE 1 G: 1 INJECTION, POWDER, FOR SOLUTION INTRAMUSCULAR; INTRAVENOUS at 20:19

## 2023-01-01 RX ADMIN — POTASSIUM CHLORIDE 20 MEQ: 750 TABLET, EXTENDED RELEASE ORAL at 09:59

## 2023-01-01 RX ADMIN — POTASSIUM CHLORIDE 10 MEQ: 7.46 INJECTION, SOLUTION INTRAVENOUS at 08:28

## 2023-01-01 RX ADMIN — ZOLPIDEM TARTRATE 5 MG: 5 TABLET ORAL at 21:45

## 2023-01-01 RX ADMIN — LEVOTHYROXINE SODIUM 100 MCG: 100 TABLET ORAL at 08:29

## 2023-01-01 RX ADMIN — POTASSIUM CHLORIDE 20 MEQ: 750 TABLET, EXTENDED RELEASE ORAL at 20:31

## 2023-01-01 RX ADMIN — DAPTOMYCIN 500 MG: 500 INJECTION, POWDER, LYOPHILIZED, FOR SOLUTION INTRAVENOUS at 10:23

## 2023-01-01 RX ADMIN — Medication 2 MG/HR: at 14:15

## 2023-01-01 RX ADMIN — MEROPENEM 500 MG: 500 INJECTION, POWDER, FOR SOLUTION INTRAVENOUS at 09:40

## 2023-01-01 RX ADMIN — SPIRONOLACTONE 12.5 MG: 25 TABLET, FILM COATED ORAL at 12:18

## 2023-01-01 RX ADMIN — WARFARIN SODIUM 2 MG: 2 TABLET ORAL at 18:50

## 2023-01-01 RX ADMIN — DAPAGLIFLOZIN 5 MG: 5 TABLET, FILM COATED ORAL at 07:36

## 2023-01-01 RX ADMIN — AMIODARONE HYDROCHLORIDE 200 MG: 200 TABLET ORAL at 08:56

## 2023-01-01 RX ADMIN — HYDRALAZINE HYDROCHLORIDE 50 MG: 50 TABLET, FILM COATED ORAL at 20:05

## 2023-01-01 RX ADMIN — ISOSORBIDE MONONITRATE 120 MG: 60 TABLET, EXTENDED RELEASE ORAL at 07:47

## 2023-01-01 RX ADMIN — FERROUS SULFATE TAB 325 MG (65 MG ELEMENTAL FE) 325 MG: 325 (65 FE) TAB at 09:05

## 2023-01-01 RX ADMIN — DOBUTAMINE HYDROCHLORIDE 5 MCG/KG/MIN: 200 INJECTION INTRAVENOUS at 23:24

## 2023-01-01 RX ADMIN — ALLOPURINOL 200 MG: 100 TABLET ORAL at 09:01

## 2023-01-01 RX ADMIN — TORSEMIDE 100 MG: 100 TABLET ORAL at 20:00

## 2023-01-01 RX ADMIN — MAGNESIUM OXIDE TAB 400 MG (241.3 MG ELEMENTAL MG) 400 MG: 400 (241.3 MG) TAB at 16:08

## 2023-01-01 RX ADMIN — Medication 2 MG/HR: at 09:19

## 2023-01-01 RX ADMIN — Medication 1 CAPSULE: at 20:37

## 2023-01-01 RX ADMIN — ALLOPURINOL 200 MG: 100 TABLET ORAL at 08:23

## 2023-01-01 RX ADMIN — BUMETANIDE 2 MG/HR: 0.25 INJECTION INTRAMUSCULAR; INTRAVENOUS at 06:31

## 2023-01-01 RX ADMIN — MAGNESIUM OXIDE TAB 400 MG (240 MG ELEMENTAL MG) 400 MG: 400 (240 MG) TAB at 09:13

## 2023-01-01 RX ADMIN — LEVOTHYROXINE SODIUM 100 MCG: 100 TABLET ORAL at 08:27

## 2023-01-01 RX ADMIN — VANCOMYCIN HYDROCHLORIDE 125 MG: KIT at 18:28

## 2023-01-01 RX ADMIN — BUMETANIDE 4 MG: 0.25 INJECTION INTRAMUSCULAR; INTRAVENOUS at 17:08

## 2023-01-01 RX ADMIN — MAGNESIUM OXIDE TAB 400 MG (241.3 MG ELEMENTAL MG) 400 MG: 400 (241.3 MG) TAB at 11:45

## 2023-01-01 RX ADMIN — HYDROXYZINE HYDROCHLORIDE 25 MG: 25 TABLET, FILM COATED ORAL at 21:27

## 2023-01-01 RX ADMIN — FINASTERIDE 5 MG: 5 TABLET, FILM COATED ORAL at 07:50

## 2023-01-01 RX ADMIN — BUMETANIDE 4 MG: 0.25 INJECTION INTRAMUSCULAR; INTRAVENOUS at 11:35

## 2023-01-01 RX ADMIN — ALTEPLASE 2 MG: 2.2 INJECTION, POWDER, LYOPHILIZED, FOR SOLUTION INTRAVENOUS at 11:17

## 2023-01-01 RX ADMIN — HYDRALAZINE HYDROCHLORIDE 25 MG: 25 TABLET, FILM COATED ORAL at 13:20

## 2023-01-01 RX ADMIN — POTASSIUM CHLORIDE 40 MEQ: 750 TABLET, EXTENDED RELEASE ORAL at 08:15

## 2023-01-01 RX ADMIN — POTASSIUM CHLORIDE 80 MEQ: 1500 TABLET, EXTENDED RELEASE ORAL at 16:16

## 2023-01-01 RX ADMIN — POTASSIUM CHLORIDE 80 MEQ: 1500 TABLET, EXTENDED RELEASE ORAL at 13:48

## 2023-01-01 RX ADMIN — HYDRALAZINE HYDROCHLORIDE 50 MG: 50 TABLET, FILM COATED ORAL at 08:15

## 2023-01-01 RX ADMIN — CHLOROTHIAZIDE SODIUM 1000 MG: 500 INJECTION, POWDER, LYOPHILIZED, FOR SOLUTION INTRAVENOUS at 11:46

## 2023-01-01 RX ADMIN — POTASSIUM CHLORIDE 20 MEQ: 1500 TABLET, EXTENDED RELEASE ORAL at 20:31

## 2023-01-01 RX ADMIN — TORSEMIDE 100 MG: 100 TABLET ORAL at 08:22

## 2023-01-01 RX ADMIN — ISOSORBIDE MONONITRATE 120 MG: 60 TABLET, EXTENDED RELEASE ORAL at 08:09

## 2023-01-01 RX ADMIN — AMIODARONE HYDROCHLORIDE 200 MG: 200 TABLET ORAL at 10:53

## 2023-01-01 RX ADMIN — POTASSIUM CHLORIDE 20 MEQ: 750 TABLET, EXTENDED RELEASE ORAL at 01:38

## 2023-01-01 RX ADMIN — ASPIRIN 81 MG: 81 TABLET ORAL at 08:08

## 2023-01-01 RX ADMIN — PANTOPRAZOLE SODIUM 40 MG: 40 INJECTION, POWDER, FOR SOLUTION INTRAVENOUS at 20:03

## 2023-01-01 RX ADMIN — CILOSTAZOL 100 MG: 100 TABLET ORAL at 11:26

## 2023-01-01 RX ADMIN — POTASSIUM CHLORIDE 60 MEQ: 1500 TABLET, EXTENDED RELEASE ORAL at 20:31

## 2023-01-01 RX ADMIN — DAPTOMYCIN 500 MG: 500 INJECTION, POWDER, LYOPHILIZED, FOR SOLUTION INTRAVENOUS at 17:07

## 2023-01-01 RX ADMIN — POTASSIUM CHLORIDE 100 MEQ: 1500 TABLET, EXTENDED RELEASE ORAL at 09:21

## 2023-01-01 RX ADMIN — ZOLPIDEM TARTRATE 5 MG: 5 TABLET, FILM COATED ORAL at 23:01

## 2023-01-01 RX ADMIN — DICLOFENAC SODIUM 4 G: 10 GEL TOPICAL at 17:45

## 2023-01-01 RX ADMIN — POTASSIUM CHLORIDE 40 MEQ: 750 TABLET, EXTENDED RELEASE ORAL at 08:24

## 2023-01-01 RX ADMIN — WHITE PETROLATUM: 1.75 OINTMENT TOPICAL at 09:07

## 2023-01-01 RX ADMIN — HYDRALAZINE HYDROCHLORIDE 25 MG: 25 TABLET, FILM COATED ORAL at 21:49

## 2023-01-01 RX ADMIN — WARFARIN SODIUM 3 MG: 3 TABLET ORAL at 18:07

## 2023-01-01 RX ADMIN — ASPIRIN 81 MG: 81 TABLET ORAL at 08:25

## 2023-01-01 RX ADMIN — Medication 12.5 MG: at 21:24

## 2023-01-01 RX ADMIN — LOPERAMIDE HYDROCHLORIDE 2 MG: 2 CAPSULE ORAL at 14:28

## 2023-01-01 RX ADMIN — FINASTERIDE 5 MG: 5 TABLET, FILM COATED ORAL at 08:47

## 2023-01-01 RX ADMIN — CHLOROTHIAZIDE SODIUM 1000 MG: 500 INJECTION, POWDER, LYOPHILIZED, FOR SOLUTION INTRAVENOUS at 20:37

## 2023-01-01 RX ADMIN — MAGNESIUM OXIDE TAB 400 MG (241.3 MG ELEMENTAL MG) 400 MG: 400 (241.3 MG) TAB at 20:20

## 2023-01-01 RX ADMIN — LORAZEPAM 0.5 MG: 0.5 TABLET ORAL at 23:56

## 2023-01-01 RX ADMIN — POTASSIUM CHLORIDE 100 MEQ: 1500 TABLET, EXTENDED RELEASE ORAL at 17:09

## 2023-01-01 RX ADMIN — IRON SUCROSE 300 MG: 20 INJECTION, SOLUTION INTRAVENOUS at 12:44

## 2023-01-01 RX ADMIN — INSULIN ASPART 1 UNITS: 100 INJECTION, SOLUTION INTRAVENOUS; SUBCUTANEOUS at 12:39

## 2023-01-01 RX ADMIN — ALLOPURINOL 200 MG: 100 TABLET ORAL at 07:40

## 2023-01-01 RX ADMIN — BUMETANIDE 2 MG/HR: 0.25 INJECTION, SOLUTION INTRAMUSCULAR; INTRAVENOUS at 20:26

## 2023-01-01 RX ADMIN — AMIODARONE HYDROCHLORIDE 200 MG: 200 TABLET ORAL at 07:24

## 2023-01-01 RX ADMIN — POTASSIUM CHLORIDE 20 MEQ: 29.8 INJECTION, SOLUTION INTRAVENOUS at 16:51

## 2023-01-01 RX ADMIN — TAMSULOSIN HYDROCHLORIDE 0.8 MG: 0.4 CAPSULE ORAL at 19:52

## 2023-01-01 RX ADMIN — ZOLPIDEM TARTRATE 5 MG: 5 TABLET, FILM COATED ORAL at 21:29

## 2023-01-01 RX ADMIN — CHLOROTHIAZIDE SODIUM 1000 MG: 500 INJECTION, POWDER, LYOPHILIZED, FOR SOLUTION INTRAVENOUS at 22:37

## 2023-01-01 RX ADMIN — INSULIN ASPART 1 UNITS: 100 INJECTION, SOLUTION INTRAVENOUS; SUBCUTANEOUS at 12:04

## 2023-01-01 RX ADMIN — MAGNESIUM SULFATE IN WATER 2 G: 40 INJECTION, SOLUTION INTRAVENOUS at 06:23

## 2023-01-01 RX ADMIN — POTASSIUM CHLORIDE 40 MEQ: 750 TABLET, EXTENDED RELEASE ORAL at 09:19

## 2023-01-01 RX ADMIN — POTASSIUM CHLORIDE 100 MEQ: 1500 TABLET, EXTENDED RELEASE ORAL at 08:24

## 2023-01-01 RX ADMIN — Medication 2 MG/HR: at 11:54

## 2023-01-01 RX ADMIN — MAGNESIUM OXIDE TAB 400 MG (240 MG ELEMENTAL MG) 400 MG: 400 (240 MG) TAB at 08:14

## 2023-01-01 RX ADMIN — AMIODARONE HYDROCHLORIDE 200 MG: 200 TABLET ORAL at 08:28

## 2023-01-01 RX ADMIN — POTASSIUM CHLORIDE 20 MEQ: 750 TABLET, EXTENDED RELEASE ORAL at 18:55

## 2023-01-01 RX ADMIN — POTASSIUM CHLORIDE 80 MEQ: 1500 TABLET, EXTENDED RELEASE ORAL at 20:09

## 2023-01-01 RX ADMIN — DICLOFENAC SODIUM 2 G: 10 GEL TOPICAL at 08:21

## 2023-01-01 RX ADMIN — FERROUS SULFATE TAB 325 MG (65 MG ELEMENTAL FE) 325 MG: 325 (65 FE) TAB at 08:31

## 2023-01-01 RX ADMIN — CHLOROTHIAZIDE SODIUM 1000 MG: 500 INJECTION, POWDER, LYOPHILIZED, FOR SOLUTION INTRAVENOUS at 14:19

## 2023-01-01 RX ADMIN — DAPTOMYCIN 500 MG: 500 INJECTION, POWDER, LYOPHILIZED, FOR SOLUTION INTRAVENOUS at 15:42

## 2023-01-01 RX ADMIN — B-COMPLEX W/ C & FOLIC ACID TAB 1 MG 1 TABLET: 1 TAB at 08:26

## 2023-01-01 RX ADMIN — LOPERAMIDE HYDROCHLORIDE 2 MG: 2 CAPSULE ORAL at 11:55

## 2023-01-01 RX ADMIN — Medication 1 CAPSULE: at 08:12

## 2023-01-01 RX ADMIN — EMPAGLIFLOZIN 10 MG: 10 TABLET, FILM COATED ORAL at 08:30

## 2023-01-01 RX ADMIN — TAMSULOSIN HYDROCHLORIDE 0.8 MG: 0.4 CAPSULE ORAL at 20:26

## 2023-01-01 RX ADMIN — TAMSULOSIN HYDROCHLORIDE 0.8 MG: 0.4 CAPSULE ORAL at 21:20

## 2023-01-01 RX ADMIN — CIPROFLOXACIN 750 MG: 250 TABLET, FILM COATED ORAL at 20:27

## 2023-01-01 RX ADMIN — AMIODARONE HYDROCHLORIDE 200 MG: 200 TABLET ORAL at 09:05

## 2023-01-01 RX ADMIN — ALLOPURINOL 200 MG: 100 TABLET ORAL at 09:40

## 2023-01-01 RX ADMIN — WHITE PETROLATUM: 1.75 OINTMENT TOPICAL at 22:27

## 2023-01-01 RX ADMIN — ASPIRIN 81 MG: 81 TABLET ORAL at 08:14

## 2023-01-01 RX ADMIN — DAPAGLIFLOZIN 10 MG: 10 TABLET, FILM COATED ORAL at 10:39

## 2023-01-01 RX ADMIN — TORSEMIDE 100 MG: 100 TABLET ORAL at 08:04

## 2023-01-01 RX ADMIN — BUMETANIDE 2 MG/HR: 0.25 INJECTION INTRAMUSCULAR; INTRAVENOUS at 15:12

## 2023-01-01 RX ADMIN — BUMETANIDE 2 MG/HR: 0.25 INJECTION INTRAMUSCULAR; INTRAVENOUS at 13:00

## 2023-01-01 RX ADMIN — MEROPENEM 500 MG: 500 INJECTION, POWDER, FOR SOLUTION INTRAVENOUS at 09:28

## 2023-01-01 RX ADMIN — ACETAZOLAMIDE SODIUM 500 MG: 500 INJECTION, POWDER, LYOPHILIZED, FOR SOLUTION INTRAVENOUS at 20:36

## 2023-01-01 RX ADMIN — HYDRALAZINE HYDROCHLORIDE 100 MG: 100 TABLET ORAL at 07:51

## 2023-01-01 RX ADMIN — ACETAZOLAMIDE SODIUM 500 MG: 500 INJECTION, POWDER, LYOPHILIZED, FOR SOLUTION INTRAVENOUS at 20:23

## 2023-01-01 RX ADMIN — BUMETANIDE 2 MG/HR: 0.25 INJECTION INTRAMUSCULAR; INTRAVENOUS at 02:36

## 2023-01-01 RX ADMIN — POTASSIUM CHLORIDE 40 MEQ: 750 TABLET, EXTENDED RELEASE ORAL at 20:17

## 2023-01-01 RX ADMIN — POTASSIUM CHLORIDE 100 MEQ: 1500 TABLET, EXTENDED RELEASE ORAL at 13:23

## 2023-01-01 RX ADMIN — WARFARIN SODIUM 3 MG: 3 TABLET ORAL at 17:55

## 2023-01-01 RX ADMIN — TORSEMIDE 100 MG: 100 TABLET ORAL at 20:30

## 2023-01-01 RX ADMIN — TORSEMIDE 100 MG: 100 TABLET ORAL at 15:26

## 2023-01-01 RX ADMIN — HYDRALAZINE HYDROCHLORIDE 50 MG: 50 TABLET, FILM COATED ORAL at 07:46

## 2023-01-01 RX ADMIN — HYDROXYZINE HYDROCHLORIDE 25 MG: 25 TABLET, FILM COATED ORAL at 11:52

## 2023-01-01 RX ADMIN — HYDRALAZINE HYDROCHLORIDE 100 MG: 100 TABLET ORAL at 08:46

## 2023-01-01 RX ADMIN — CHLOROTHIAZIDE SODIUM 500 MG: 500 INJECTION, POWDER, LYOPHILIZED, FOR SOLUTION INTRAVENOUS at 19:10

## 2023-01-01 RX ADMIN — POTASSIUM CHLORIDE 10 MEQ: 7.46 INJECTION, SOLUTION INTRAVENOUS at 09:39

## 2023-01-01 RX ADMIN — POTASSIUM CHLORIDE 80 MEQ: 1500 TABLET, EXTENDED RELEASE ORAL at 16:46

## 2023-01-01 RX ADMIN — BUMETANIDE 8 MG: 0.25 INJECTION INTRAMUSCULAR; INTRAVENOUS at 06:19

## 2023-01-01 RX ADMIN — Medication 1 CAPSULE: at 20:17

## 2023-01-01 RX ADMIN — POTASSIUM CHLORIDE 40 MEQ: 750 TABLET, EXTENDED RELEASE ORAL at 13:53

## 2023-01-01 RX ADMIN — DICLOFENAC SODIUM 4 G: 10 GEL TOPICAL at 11:17

## 2023-01-01 RX ADMIN — TAMSULOSIN HYDROCHLORIDE 0.8 MG: 0.4 CAPSULE ORAL at 19:46

## 2023-01-01 RX ADMIN — Medication 5 ML: at 09:44

## 2023-01-01 RX ADMIN — ALLOPURINOL 200 MG: 100 TABLET ORAL at 10:39

## 2023-01-01 RX ADMIN — LEVOTHYROXINE SODIUM 100 MCG: 100 TABLET ORAL at 11:54

## 2023-01-01 RX ADMIN — POTASSIUM CHLORIDE 80 MEQ: 1500 TABLET, EXTENDED RELEASE ORAL at 20:31

## 2023-01-01 RX ADMIN — VANCOMYCIN HYDROCHLORIDE 125 MG: KIT at 21:42

## 2023-01-01 RX ADMIN — POTASSIUM CHLORIDE 40 MEQ: 750 TABLET, EXTENDED RELEASE ORAL at 19:37

## 2023-01-01 RX ADMIN — MAGNESIUM OXIDE TAB 400 MG (241.3 MG ELEMENTAL MG) 400 MG: 400 (241.3 MG) TAB at 08:16

## 2023-01-01 RX ADMIN — ALLOPURINOL 200 MG: 100 TABLET ORAL at 07:24

## 2023-01-01 RX ADMIN — HYDRALAZINE HYDROCHLORIDE 25 MG: 25 TABLET, FILM COATED ORAL at 21:31

## 2023-01-01 RX ADMIN — POTASSIUM CHLORIDE 80 MEQ: 1500 TABLET, EXTENDED RELEASE ORAL at 08:17

## 2023-01-01 RX ADMIN — DOBUTAMINE HYDROCHLORIDE 5 MCG/KG/MIN: 200 INJECTION INTRAVENOUS at 14:05

## 2023-01-01 RX ADMIN — POTASSIUM CHLORIDE 10 MEQ: 7.46 INJECTION, SOLUTION INTRAVENOUS at 13:18

## 2023-01-01 RX ADMIN — POTASSIUM CHLORIDE 10 MEQ: 7.46 INJECTION, SOLUTION INTRAVENOUS at 08:15

## 2023-01-01 RX ADMIN — DICLOFENAC SODIUM 2 G: 10 GEL TOPICAL at 09:01

## 2023-01-01 RX ADMIN — POTASSIUM CHLORIDE 40 MEQ: 750 TABLET, EXTENDED RELEASE ORAL at 14:45

## 2023-01-01 RX ADMIN — ISOSORBIDE MONONITRATE 120 MG: 60 TABLET, EXTENDED RELEASE ORAL at 07:39

## 2023-01-01 RX ADMIN — POTASSIUM CHLORIDE 40 MEQ: 750 TABLET, EXTENDED RELEASE ORAL at 20:23

## 2023-01-01 RX ADMIN — FINASTERIDE 5 MG: 5 TABLET, FILM COATED ORAL at 07:40

## 2023-01-01 RX ADMIN — LOPERAMIDE HYDROCHLORIDE 2 MG: 2 CAPSULE ORAL at 10:01

## 2023-01-01 RX ADMIN — CIPROFLOXACIN HYDROCHLORIDE 500 MG: 500 TABLET, FILM COATED ORAL at 08:13

## 2023-01-01 RX ADMIN — CIPROFLOXACIN HYDROCHLORIDE 500 MG: 500 TABLET, FILM COATED ORAL at 08:22

## 2023-01-01 RX ADMIN — METOLAZONE 2.5 MG: 2.5 TABLET ORAL at 14:14

## 2023-01-01 RX ADMIN — Medication 12.5 MG: at 21:50

## 2023-01-01 RX ADMIN — PAROXETINE 20 MG: 20 TABLET, FILM COATED ORAL at 09:00

## 2023-01-01 RX ADMIN — Medication 2 MG/HR: at 07:46

## 2023-01-01 RX ADMIN — AMIODARONE HYDROCHLORIDE 200 MG: 200 TABLET ORAL at 08:49

## 2023-01-01 RX ADMIN — ISOSORBIDE MONONITRATE 120 MG: 60 TABLET, EXTENDED RELEASE ORAL at 08:22

## 2023-01-01 RX ADMIN — POTASSIUM CHLORIDE 100 MEQ: 1500 TABLET, EXTENDED RELEASE ORAL at 20:37

## 2023-01-01 RX ADMIN — ALLOPURINOL 200 MG: 100 TABLET ORAL at 08:01

## 2023-01-01 RX ADMIN — DOBUTAMINE HYDROCHLORIDE 2.5 MCG/KG/MIN: 200 INJECTION INTRAVENOUS at 14:22

## 2023-01-01 RX ADMIN — PANTOPRAZOLE SODIUM 40 MG: 40 TABLET, DELAYED RELEASE ORAL at 17:27

## 2023-01-01 RX ADMIN — BUMETANIDE 2 MG/HR: 0.25 INJECTION INTRAMUSCULAR; INTRAVENOUS at 20:15

## 2023-01-01 RX ADMIN — POTASSIUM CHLORIDE 40 MEQ: 1500 TABLET, EXTENDED RELEASE ORAL at 07:01

## 2023-01-01 RX ADMIN — FERROUS SULFATE TAB 325 MG (65 MG ELEMENTAL FE) 325 MG: 325 (65 FE) TAB at 08:46

## 2023-01-01 RX ADMIN — CILOSTAZOL 100 MG: 100 TABLET ORAL at 08:22

## 2023-01-01 RX ADMIN — INSULIN ASPART 1 UNITS: 100 INJECTION, SOLUTION INTRAVENOUS; SUBCUTANEOUS at 11:18

## 2023-01-01 RX ADMIN — MAGNESIUM OXIDE TAB 400 MG (241.3 MG ELEMENTAL MG) 400 MG: 400 (241.3 MG) TAB at 09:05

## 2023-01-01 RX ADMIN — MAGNESIUM OXIDE TAB 400 MG (241.3 MG ELEMENTAL MG) 400 MG: 400 (241.3 MG) TAB at 13:57

## 2023-01-01 RX ADMIN — SPIRONOLACTONE 25 MG: 25 TABLET, FILM COATED ORAL at 08:04

## 2023-01-01 RX ADMIN — LEVOTHYROXINE SODIUM 100 MCG: 100 TABLET ORAL at 08:11

## 2023-01-01 RX ADMIN — POTASSIUM CHLORIDE 20 MEQ: 29.8 INJECTION, SOLUTION INTRAVENOUS at 09:45

## 2023-01-01 RX ADMIN — VANCOMYCIN HYDROCHLORIDE 125 MG: KIT at 11:45

## 2023-01-01 RX ADMIN — LEVOTHYROXINE SODIUM 88 MCG: 88 TABLET ORAL at 06:24

## 2023-01-01 RX ADMIN — PAROXETINE HYDROCHLORIDE 20 MG: 10 TABLET, FILM COATED ORAL at 08:26

## 2023-01-01 RX ADMIN — WARFARIN SODIUM 1.5 MG: 3 TABLET ORAL at 17:59

## 2023-01-01 RX ADMIN — LEVOTHYROXINE SODIUM 100 MCG: 100 TABLET ORAL at 08:49

## 2023-01-01 RX ADMIN — METHOCARBAMOL 500 MG: 500 TABLET ORAL at 10:52

## 2023-01-01 RX ADMIN — BUMETANIDE 2 MG/HR: 0.25 INJECTION INTRAMUSCULAR; INTRAVENOUS at 08:28

## 2023-01-01 RX ADMIN — METHOCARBAMOL 500 MG: 500 TABLET ORAL at 21:36

## 2023-01-01 RX ADMIN — PANTOPRAZOLE SODIUM 40 MG: 40 TABLET, DELAYED RELEASE ORAL at 11:39

## 2023-01-01 RX ADMIN — LEVOTHYROXINE SODIUM 100 MCG: 100 TABLET ORAL at 07:53

## 2023-01-01 RX ADMIN — ASPIRIN 81 MG: 81 TABLET ORAL at 09:19

## 2023-01-01 RX ADMIN — FINASTERIDE 5 MG: 5 TABLET, FILM COATED ORAL at 09:00

## 2023-01-01 RX ADMIN — TORSEMIDE 100 MG: 100 TABLET ORAL at 08:56

## 2023-01-01 RX ADMIN — BUMETANIDE 2 MG/HR: 0.25 INJECTION INTRAMUSCULAR; INTRAVENOUS at 02:41

## 2023-01-01 RX ADMIN — POTASSIUM CHLORIDE 40 MEQ: 750 TABLET, EXTENDED RELEASE ORAL at 17:41

## 2023-01-01 RX ADMIN — WARFARIN SODIUM 1.5 MG: 3 TABLET ORAL at 17:39

## 2023-01-01 RX ADMIN — Medication 12.5 MG: at 22:58

## 2023-01-01 RX ADMIN — INSULIN ASPART 2 UNITS: 100 INJECTION, SOLUTION INTRAVENOUS; SUBCUTANEOUS at 12:34

## 2023-01-01 RX ADMIN — POTASSIUM CHLORIDE 80 MEQ: 1500 TABLET, EXTENDED RELEASE ORAL at 19:46

## 2023-01-01 RX ADMIN — FERROUS SULFATE TAB 325 MG (65 MG ELEMENTAL FE) 325 MG: 325 (65 FE) TAB at 11:50

## 2023-01-01 RX ADMIN — LEVOTHYROXINE SODIUM 88 MCG: 88 TABLET ORAL at 07:01

## 2023-01-01 RX ADMIN — MEROPENEM 500 MG: 500 INJECTION, POWDER, FOR SOLUTION INTRAVENOUS at 11:31

## 2023-01-01 RX ADMIN — FERROUS SULFATE TAB 325 MG (65 MG ELEMENTAL FE) 325 MG: 325 (65 FE) TAB at 08:12

## 2023-01-01 RX ADMIN — MAGNESIUM OXIDE TAB 400 MG (241.3 MG ELEMENTAL MG) 400 MG: 400 (241.3 MG) TAB at 17:02

## 2023-01-01 RX ADMIN — TORSEMIDE 100 MG: 100 TABLET ORAL at 11:28

## 2023-01-01 RX ADMIN — POTASSIUM CHLORIDE 40 MEQ: 750 TABLET, EXTENDED RELEASE ORAL at 12:12

## 2023-01-01 RX ADMIN — CIPROFLOXACIN HYDROCHLORIDE 500 MG: 500 TABLET, FILM COATED ORAL at 08:14

## 2023-01-01 RX ADMIN — ZOLPIDEM TARTRATE 5 MG: 5 TABLET ORAL at 21:28

## 2023-01-01 RX ADMIN — Medication 1 CAPSULE: at 10:40

## 2023-01-01 RX ADMIN — DAPTOMYCIN 500 MG: 500 INJECTION, POWDER, LYOPHILIZED, FOR SOLUTION INTRAVENOUS at 22:11

## 2023-01-01 RX ADMIN — Medication 1 CAPSULE: at 12:31

## 2023-01-01 RX ADMIN — BUMETANIDE 2 MG/HR: 0.25 INJECTION INTRAMUSCULAR; INTRAVENOUS at 14:26

## 2023-01-01 RX ADMIN — MAGNESIUM OXIDE TAB 400 MG (241.3 MG ELEMENTAL MG) 400 MG: 400 (241.3 MG) TAB at 08:27

## 2023-01-01 RX ADMIN — CEFEPIME HYDROCHLORIDE 1 G: 1 INJECTION, POWDER, FOR SOLUTION INTRAMUSCULAR; INTRAVENOUS at 04:35

## 2023-01-01 RX ADMIN — PANTOPRAZOLE SODIUM 40 MG: 40 TABLET, DELAYED RELEASE ORAL at 07:41

## 2023-01-01 RX ADMIN — POTASSIUM CHLORIDE 60 MEQ: 1500 TABLET, EXTENDED RELEASE ORAL at 19:53

## 2023-01-01 RX ADMIN — FERROUS SULFATE TAB 325 MG (65 MG ELEMENTAL FE) 325 MG: 325 (65 FE) TAB at 07:24

## 2023-01-01 RX ADMIN — WHITE PETROLATUM: 1.75 OINTMENT TOPICAL at 09:02

## 2023-01-01 RX ADMIN — HYDRALAZINE HYDROCHLORIDE 100 MG: 100 TABLET ORAL at 14:32

## 2023-01-01 RX ADMIN — Medication 2 MG/HR: at 11:13

## 2023-01-01 RX ADMIN — SPIRONOLACTONE 25 MG: 25 TABLET, FILM COATED ORAL at 08:08

## 2023-01-01 RX ADMIN — B-COMPLEX W/ C & FOLIC ACID TAB 1 MG 1 TABLET: 1 TAB at 08:10

## 2023-01-01 RX ADMIN — POTASSIUM CHLORIDE 60 MEQ: 1500 TABLET, EXTENDED RELEASE ORAL at 20:42

## 2023-01-01 RX ADMIN — FENTANYL CITRATE 50 MCG: 50 INJECTION, SOLUTION INTRAMUSCULAR; INTRAVENOUS at 10:17

## 2023-01-01 RX ADMIN — HYDRALAZINE HYDROCHLORIDE 100 MG: 100 TABLET ORAL at 14:57

## 2023-01-01 RX ADMIN — INSULIN ASPART 1 UNITS: 100 INJECTION, SOLUTION INTRAVENOUS; SUBCUTANEOUS at 16:13

## 2023-01-01 RX ADMIN — Medication 5 MG: at 22:14

## 2023-01-01 RX ADMIN — PAROXETINE HYDROCHLORIDE 10 MG: 10 TABLET, FILM COATED ORAL at 09:05

## 2023-01-01 RX ADMIN — MAGNESIUM OXIDE TAB 400 MG (241.3 MG ELEMENTAL MG) 400 MG: 400 (241.3 MG) TAB at 11:25

## 2023-01-01 RX ADMIN — POTASSIUM CHLORIDE 80 MEQ: 1500 TABLET, EXTENDED RELEASE ORAL at 14:04

## 2023-01-01 RX ADMIN — ZOLPIDEM TARTRATE 5 MG: 5 TABLET, FILM COATED ORAL at 21:52

## 2023-01-01 RX ADMIN — BUMETANIDE 8 MG: 0.25 INJECTION INTRAMUSCULAR; INTRAVENOUS at 06:00

## 2023-01-01 RX ADMIN — POTASSIUM CHLORIDE 20 MEQ: 29.8 INJECTION, SOLUTION INTRAVENOUS at 08:47

## 2023-01-01 RX ADMIN — POTASSIUM CHLORIDE 100 MEQ: 1500 TABLET, EXTENDED RELEASE ORAL at 09:13

## 2023-01-01 RX ADMIN — TAMSULOSIN HYDROCHLORIDE 0.8 MG: 0.4 CAPSULE ORAL at 20:32

## 2023-01-01 RX ADMIN — WARFARIN SODIUM 2 MG: 1 TABLET ORAL at 18:11

## 2023-01-01 RX ADMIN — POTASSIUM CHLORIDE 80 MEQ: 1500 TABLET, EXTENDED RELEASE ORAL at 11:29

## 2023-01-01 RX ADMIN — HYDRALAZINE HYDROCHLORIDE 25 MG: 25 TABLET, FILM COATED ORAL at 05:02

## 2023-01-01 RX ADMIN — BUMETANIDE 2 MG/HR: 0.25 INJECTION INTRAMUSCULAR; INTRAVENOUS at 11:31

## 2023-01-01 RX ADMIN — HYDRALAZINE HYDROCHLORIDE 50 MG: 50 TABLET, FILM COATED ORAL at 13:48

## 2023-01-01 RX ADMIN — DOBUTAMINE HYDROCHLORIDE 5 MCG/KG/MIN: 200 INJECTION INTRAVENOUS at 21:56

## 2023-01-01 RX ADMIN — PANTOPRAZOLE SODIUM 40 MG: 40 TABLET, DELAYED RELEASE ORAL at 08:17

## 2023-01-01 RX ADMIN — METHOCARBAMOL 500 MG: 500 TABLET ORAL at 15:26

## 2023-01-01 RX ADMIN — MAGNESIUM SULFATE IN WATER 2 G: 40 INJECTION, SOLUTION INTRAVENOUS at 13:53

## 2023-01-01 RX ADMIN — HYDRALAZINE HYDROCHLORIDE 50 MG: 50 TABLET, FILM COATED ORAL at 20:23

## 2023-01-01 RX ADMIN — PANTOPRAZOLE SODIUM 40 MG: 40 TABLET, DELAYED RELEASE ORAL at 08:23

## 2023-01-01 RX ADMIN — FINASTERIDE 5 MG: 5 TABLET, FILM COATED ORAL at 08:01

## 2023-01-01 RX ADMIN — ASPIRIN 81 MG: 81 TABLET ORAL at 08:22

## 2023-01-01 RX ADMIN — MAGNESIUM OXIDE TAB 400 MG (241.3 MG ELEMENTAL MG) 400 MG: 400 (241.3 MG) TAB at 13:02

## 2023-01-01 RX ADMIN — BUMETANIDE 2 MG/HR: 0.25 INJECTION INTRAMUSCULAR; INTRAVENOUS at 04:09

## 2023-01-01 RX ADMIN — HYDRALAZINE HYDROCHLORIDE 100 MG: 100 TABLET, FILM COATED ORAL at 08:09

## 2023-01-01 RX ADMIN — POTASSIUM CHLORIDE 20 MEQ: 750 TABLET, EXTENDED RELEASE ORAL at 11:25

## 2023-01-01 RX ADMIN — MAGNESIUM OXIDE TAB 400 MG (241.3 MG ELEMENTAL MG) 400 MG: 400 (241.3 MG) TAB at 19:46

## 2023-01-01 RX ADMIN — MAGNESIUM OXIDE TAB 400 MG (241.3 MG ELEMENTAL MG) 400 MG: 400 (241.3 MG) TAB at 13:59

## 2023-01-01 RX ADMIN — METHOCARBAMOL 500 MG: 500 TABLET ORAL at 20:05

## 2023-01-01 RX ADMIN — METHOCARBAMOL 500 MG: 500 TABLET ORAL at 22:25

## 2023-01-01 RX ADMIN — TAMSULOSIN HYDROCHLORIDE 0.8 MG: 0.4 CAPSULE ORAL at 21:41

## 2023-01-01 RX ADMIN — HYDRALAZINE HYDROCHLORIDE 25 MG: 25 TABLET, FILM COATED ORAL at 05:21

## 2023-01-01 RX ADMIN — TAMSULOSIN HYDROCHLORIDE 0.8 MG: 0.4 CAPSULE ORAL at 20:37

## 2023-01-01 RX ADMIN — TRAMADOL HYDROCHLORIDE 50 MG: 50 TABLET, COATED ORAL at 20:40

## 2023-01-01 RX ADMIN — TORSEMIDE 100 MG: 100 TABLET ORAL at 17:06

## 2023-01-01 RX ADMIN — CEFEPIME HYDROCHLORIDE 1 G: 1 INJECTION, POWDER, FOR SOLUTION INTRAMUSCULAR; INTRAVENOUS at 15:22

## 2023-01-01 RX ADMIN — ZOLPIDEM TARTRATE 5 MG: 5 TABLET ORAL at 21:56

## 2023-01-01 RX ADMIN — PAROXETINE HYDROCHLORIDE 10 MG: 10 TABLET, FILM COATED ORAL at 09:06

## 2023-01-01 RX ADMIN — WARFARIN SODIUM 3 MG: 3 TABLET ORAL at 18:22

## 2023-01-01 RX ADMIN — DICLOFENAC SODIUM 4 G: 10 GEL TOPICAL at 20:34

## 2023-01-01 RX ADMIN — DICLOFENAC SODIUM 2 G: 10 GEL TOPICAL at 20:10

## 2023-01-01 RX ADMIN — PANTOPRAZOLE SODIUM 40 MG: 40 TABLET, DELAYED RELEASE ORAL at 08:21

## 2023-01-01 RX ADMIN — FINASTERIDE 5 MG: 5 TABLET, FILM COATED ORAL at 08:28

## 2023-01-01 RX ADMIN — BUMETANIDE 2 MG/HR: 0.25 INJECTION INTRAMUSCULAR; INTRAVENOUS at 02:54

## 2023-01-01 RX ADMIN — DOBUTAMINE HYDROCHLORIDE 5 MCG/KG/MIN: 200 INJECTION INTRAVENOUS at 17:33

## 2023-01-01 RX ADMIN — WARFARIN SODIUM 4 MG: 4 TABLET ORAL at 18:34

## 2023-01-01 RX ADMIN — METHOCARBAMOL 500 MG: 500 TABLET ORAL at 13:53

## 2023-01-01 RX ADMIN — POTASSIUM CHLORIDE 60 MEQ: 1500 TABLET, EXTENDED RELEASE ORAL at 20:40

## 2023-01-01 RX ADMIN — ALLOPURINOL 200 MG: 100 TABLET ORAL at 07:33

## 2023-01-01 RX ADMIN — POTASSIUM CHLORIDE 20 MEQ: 29.8 INJECTION, SOLUTION INTRAVENOUS at 09:04

## 2023-01-01 RX ADMIN — HYDRALAZINE HYDROCHLORIDE 25 MG: 25 TABLET, FILM COATED ORAL at 21:48

## 2023-01-01 RX ADMIN — ASPIRIN 81 MG: 81 TABLET ORAL at 08:24

## 2023-01-01 RX ADMIN — MAGNESIUM SULFATE IN WATER 2 G: 40 INJECTION, SOLUTION INTRAVENOUS at 10:06

## 2023-01-01 RX ADMIN — AMLODIPINE BESYLATE 10 MG: 10 TABLET ORAL at 07:41

## 2023-01-01 RX ADMIN — AMIODARONE HYDROCHLORIDE 200 MG: 200 TABLET ORAL at 08:26

## 2023-01-01 RX ADMIN — LOPERAMIDE HYDROCHLORIDE 2 MG: 2 CAPSULE ORAL at 11:06

## 2023-01-01 RX ADMIN — METHOCARBAMOL 500 MG: 500 TABLET ORAL at 22:04

## 2023-01-01 RX ADMIN — BUMETANIDE 8 MG: 0.25 INJECTION INTRAMUSCULAR; INTRAVENOUS at 23:52

## 2023-01-01 RX ADMIN — TORSEMIDE 100 MG: 100 TABLET ORAL at 14:45

## 2023-01-01 RX ADMIN — TAMSULOSIN HYDROCHLORIDE 0.8 MG: 0.4 CAPSULE ORAL at 19:44

## 2023-01-01 RX ADMIN — Medication 1 CAPSULE: at 10:06

## 2023-01-01 RX ADMIN — AMLODIPINE BESYLATE 10 MG: 10 TABLET ORAL at 10:53

## 2023-01-01 RX ADMIN — DOPAMINE HYDROCHLORIDE 2.5 MCG/KG/MIN: 160 INJECTION, SOLUTION INTRAVENOUS at 18:07

## 2023-01-01 RX ADMIN — METHOCARBAMOL 500 MG: 500 TABLET ORAL at 21:42

## 2023-01-01 RX ADMIN — MAGNESIUM OXIDE TAB 400 MG (241.3 MG ELEMENTAL MG) 400 MG: 400 (241.3 MG) TAB at 17:13

## 2023-01-01 RX ADMIN — PANTOPRAZOLE SODIUM 40 MG: 40 TABLET, DELAYED RELEASE ORAL at 15:41

## 2023-01-01 RX ADMIN — BUMETANIDE 6 MG: 2 TABLET ORAL at 08:07

## 2023-01-01 RX ADMIN — TORSEMIDE 100 MG: 100 TABLET ORAL at 21:06

## 2023-01-01 RX ADMIN — FINASTERIDE 5 MG: 5 TABLET, FILM COATED ORAL at 08:17

## 2023-01-01 RX ADMIN — MAGNESIUM OXIDE TAB 400 MG (241.3 MG ELEMENTAL MG) 400 MG: 400 (241.3 MG) TAB at 18:30

## 2023-01-01 RX ADMIN — POTASSIUM CHLORIDE 40 MEQ: 1500 TABLET, EXTENDED RELEASE ORAL at 11:46

## 2023-01-01 RX ADMIN — B-COMPLEX W/ C & FOLIC ACID TAB 1 MG 1 TABLET: 1 TAB at 11:40

## 2023-01-01 RX ADMIN — QUETIAPINE FUMARATE 25 MG: 25 TABLET ORAL at 22:35

## 2023-01-01 RX ADMIN — POTASSIUM CHLORIDE 80 MEQ: 1500 TABLET, EXTENDED RELEASE ORAL at 20:14

## 2023-01-01 RX ADMIN — DAPAGLIFLOZIN 10 MG: 10 TABLET, FILM COATED ORAL at 08:35

## 2023-01-01 RX ADMIN — DAPAGLIFLOZIN 5 MG: 5 TABLET, FILM COATED ORAL at 09:57

## 2023-01-01 RX ADMIN — DOBUTAMINE HYDROCHLORIDE 5 MCG/KG/MIN: 200 INJECTION INTRAVENOUS at 02:42

## 2023-01-01 RX ADMIN — AMIODARONE HYDROCHLORIDE 200 MG: 200 TABLET ORAL at 08:02

## 2023-01-01 RX ADMIN — POTASSIUM CHLORIDE 80 MEQ: 1500 TABLET, EXTENDED RELEASE ORAL at 19:49

## 2023-01-01 RX ADMIN — CEFEPIME HYDROCHLORIDE 1 G: 1 INJECTION, POWDER, FOR SOLUTION INTRAMUSCULAR; INTRAVENOUS at 04:57

## 2023-01-01 RX ADMIN — ISOSORBIDE MONONITRATE 120 MG: 60 TABLET, EXTENDED RELEASE ORAL at 07:41

## 2023-01-01 RX ADMIN — FERROUS SULFATE TAB 325 MG (65 MG ELEMENTAL FE) 325 MG: 325 (65 FE) TAB at 07:38

## 2023-01-01 RX ADMIN — MAGNESIUM OXIDE TAB 400 MG (241.3 MG ELEMENTAL MG) 400 MG: 400 (241.3 MG) TAB at 21:00

## 2023-01-01 RX ADMIN — BUMETANIDE 2 MG/HR: 0.25 INJECTION INTRAMUSCULAR; INTRAVENOUS at 15:05

## 2023-01-01 RX ADMIN — INSULIN ASPART 2 UNITS: 100 INJECTION, SOLUTION INTRAVENOUS; SUBCUTANEOUS at 13:28

## 2023-01-01 RX ADMIN — LEVOTHYROXINE SODIUM 88 MCG: 88 TABLET ORAL at 07:40

## 2023-01-01 RX ADMIN — PAROXETINE 20 MG: 20 TABLET, FILM COATED ORAL at 08:24

## 2023-01-01 RX ADMIN — POTASSIUM CHLORIDE 40 MEQ: 750 TABLET, EXTENDED RELEASE ORAL at 13:50

## 2023-01-01 RX ADMIN — TORSEMIDE 100 MG: 100 TABLET ORAL at 11:00

## 2023-01-01 RX ADMIN — Medication 12.5 MG: at 21:25

## 2023-01-01 RX ADMIN — CIPROFLOXACIN HYDROCHLORIDE 500 MG: 500 TABLET, FILM COATED ORAL at 19:00

## 2023-01-01 RX ADMIN — PANTOPRAZOLE SODIUM 40 MG: 40 INJECTION, POWDER, FOR SOLUTION INTRAVENOUS at 20:30

## 2023-01-01 RX ADMIN — LEVOTHYROXINE SODIUM 88 MCG: 88 TABLET ORAL at 08:24

## 2023-01-01 RX ADMIN — AMLODIPINE BESYLATE 10 MG: 10 TABLET ORAL at 08:47

## 2023-01-01 RX ADMIN — POTASSIUM CHLORIDE 10 MEQ: 7.46 INJECTION, SOLUTION INTRAVENOUS at 14:37

## 2023-01-01 RX ADMIN — INSULIN ASPART 1 UNITS: 100 INJECTION, SOLUTION INTRAVENOUS; SUBCUTANEOUS at 12:43

## 2023-01-01 RX ADMIN — MAGNESIUM OXIDE TAB 400 MG (241.3 MG ELEMENTAL MG) 400 MG: 400 (241.3 MG) TAB at 11:06

## 2023-01-01 RX ADMIN — TAMSULOSIN HYDROCHLORIDE 0.8 MG: 0.4 CAPSULE ORAL at 21:13

## 2023-01-01 RX ADMIN — LEVOTHYROXINE SODIUM 100 MCG: 100 TABLET ORAL at 08:37

## 2023-01-01 RX ADMIN — MAGNESIUM OXIDE TAB 400 MG (241.3 MG ELEMENTAL MG) 400 MG: 400 (241.3 MG) TAB at 19:50

## 2023-01-01 RX ADMIN — INSULIN ASPART 1 UNITS: 100 INJECTION, SOLUTION INTRAVENOUS; SUBCUTANEOUS at 11:33

## 2023-01-01 RX ADMIN — POTASSIUM CHLORIDE 10 MEQ: 7.46 INJECTION, SOLUTION INTRAVENOUS at 10:34

## 2023-01-01 RX ADMIN — HYDRALAZINE HYDROCHLORIDE 100 MG: 100 TABLET ORAL at 08:24

## 2023-01-01 RX ADMIN — Medication 2 MG/HR: at 21:10

## 2023-01-01 RX ADMIN — POTASSIUM CHLORIDE 20 MEQ: 29.8 INJECTION, SOLUTION INTRAVENOUS at 07:20

## 2023-01-01 RX ADMIN — POTASSIUM CHLORIDE 40 MEQ: 750 TABLET, EXTENDED RELEASE ORAL at 06:22

## 2023-01-01 RX ADMIN — TAMSULOSIN HYDROCHLORIDE 0.8 MG: 0.4 CAPSULE ORAL at 20:14

## 2023-01-01 RX ADMIN — MAGNESIUM OXIDE TAB 400 MG (240 MG ELEMENTAL MG) 400 MG: 400 (240 MG) TAB at 19:37

## 2023-01-01 RX ADMIN — TAMSULOSIN HYDROCHLORIDE 0.8 MG: 0.4 CAPSULE ORAL at 19:48

## 2023-01-01 RX ADMIN — MAGNESIUM OXIDE TAB 400 MG (241.3 MG ELEMENTAL MG) 400 MG: 400 (241.3 MG) TAB at 08:19

## 2023-01-01 RX ADMIN — MAGNESIUM OXIDE TAB 400 MG (240 MG ELEMENTAL MG) 400 MG: 400 (240 MG) TAB at 11:17

## 2023-01-01 RX ADMIN — DOBUTAMINE HYDROCHLORIDE 2.5 MCG/KG/MIN: 200 INJECTION INTRAVENOUS at 10:06

## 2023-01-01 RX ADMIN — POTASSIUM CHLORIDE 40 MEQ: 750 TABLET, EXTENDED RELEASE ORAL at 13:01

## 2023-01-01 RX ADMIN — BUMETANIDE 8 MG: 0.25 INJECTION INTRAMUSCULAR; INTRAVENOUS at 12:29

## 2023-01-01 RX ADMIN — LEVOTHYROXINE SODIUM 100 MCG: 100 TABLET ORAL at 09:01

## 2023-01-01 RX ADMIN — Medication 400 MG: at 16:11

## 2023-01-01 RX ADMIN — HYDRALAZINE HYDROCHLORIDE 50 MG: 50 TABLET, FILM COATED ORAL at 19:31

## 2023-01-01 RX ADMIN — WARFARIN SODIUM 0.5 MG: 1 TABLET ORAL at 17:56

## 2023-01-01 RX ADMIN — B-COMPLEX W/ C & FOLIC ACID TAB 1 MG 1 TABLET: 1 TAB at 10:52

## 2023-01-01 RX ADMIN — TAMSULOSIN HYDROCHLORIDE 0.8 MG: 0.4 CAPSULE ORAL at 20:22

## 2023-01-01 RX ADMIN — HYDRALAZINE HYDROCHLORIDE 25 MG: 25 TABLET, FILM COATED ORAL at 08:27

## 2023-01-01 RX ADMIN — TAMSULOSIN HYDROCHLORIDE 0.8 MG: 0.4 CAPSULE ORAL at 21:24

## 2023-01-01 RX ADMIN — Medication 1 CAPSULE: at 20:59

## 2023-01-01 RX ADMIN — POTASSIUM CHLORIDE 20 MEQ: 750 TABLET, EXTENDED RELEASE ORAL at 06:00

## 2023-01-01 RX ADMIN — PAROXETINE 20 MG: 20 TABLET, FILM COATED ORAL at 07:52

## 2023-01-01 RX ADMIN — HYDRALAZINE HYDROCHLORIDE 25 MG: 25 TABLET, FILM COATED ORAL at 09:39

## 2023-01-01 RX ADMIN — CEFEPIME HYDROCHLORIDE 1 G: 1 INJECTION, POWDER, FOR SOLUTION INTRAMUSCULAR; INTRAVENOUS at 19:45

## 2023-01-01 RX ADMIN — HYDRALAZINE HYDROCHLORIDE 50 MG: 50 TABLET, FILM COATED ORAL at 15:55

## 2023-01-01 RX ADMIN — ASPIRIN 81 MG: 81 TABLET ORAL at 07:24

## 2023-01-01 RX ADMIN — ISOSORBIDE MONONITRATE 120 MG: 60 TABLET, EXTENDED RELEASE ORAL at 08:30

## 2023-01-01 RX ADMIN — PAROXETINE HYDROCHLORIDE 20 MG: 10 TABLET, FILM COATED ORAL at 07:39

## 2023-01-01 RX ADMIN — BUMETANIDE 2 MG/HR: 0.25 INJECTION INTRAMUSCULAR; INTRAVENOUS at 03:14

## 2023-01-01 RX ADMIN — BUMETANIDE 4 MG: 0.25 INJECTION INTRAMUSCULAR; INTRAVENOUS at 22:16

## 2023-01-01 RX ADMIN — POTASSIUM CHLORIDE 100 MEQ: 1500 TABLET, EXTENDED RELEASE ORAL at 08:36

## 2023-01-01 RX ADMIN — DAPTOMYCIN 500 MG: 500 INJECTION, POWDER, LYOPHILIZED, FOR SOLUTION INTRAVENOUS at 15:47

## 2023-01-01 RX ADMIN — LEVOTHYROXINE SODIUM 88 MCG: 88 TABLET ORAL at 11:38

## 2023-01-01 RX ADMIN — MAGNESIUM SULFATE IN WATER 2 G: 40 INJECTION, SOLUTION INTRAVENOUS at 08:15

## 2023-01-01 RX ADMIN — MAGNESIUM OXIDE TAB 400 MG (241.3 MG ELEMENTAL MG) 400 MG: 400 (241.3 MG) TAB at 08:13

## 2023-01-01 RX ADMIN — LEVOTHYROXINE SODIUM 88 MCG: 88 TABLET ORAL at 06:23

## 2023-01-01 RX ADMIN — BUMETANIDE 2 MG/HR: 0.25 INJECTION INTRAMUSCULAR; INTRAVENOUS at 00:42

## 2023-01-01 RX ADMIN — CIPROFLOXACIN HYDROCHLORIDE 500 MG: 500 TABLET, FILM COATED ORAL at 07:40

## 2023-01-01 RX ADMIN — POTASSIUM CHLORIDE 100 MEQ: 1500 TABLET, EXTENDED RELEASE ORAL at 13:57

## 2023-01-01 RX ADMIN — POTASSIUM CHLORIDE 40 MEQ: 750 TABLET, EXTENDED RELEASE ORAL at 20:07

## 2023-01-01 RX ADMIN — HYDRALAZINE HYDROCHLORIDE 50 MG: 50 TABLET, FILM COATED ORAL at 13:01

## 2023-01-01 RX ADMIN — PANTOPRAZOLE SODIUM 40 MG: 40 TABLET, DELAYED RELEASE ORAL at 17:35

## 2023-01-01 RX ADMIN — Medication 1 CAPSULE: at 20:42

## 2023-01-01 RX ADMIN — Medication 12.5 MG: at 21:48

## 2023-01-01 RX ADMIN — POTASSIUM CHLORIDE 80 MEQ: 1500 TABLET, EXTENDED RELEASE ORAL at 21:13

## 2023-01-01 RX ADMIN — POTASSIUM CHLORIDE 20 MEQ: 750 TABLET, EXTENDED RELEASE ORAL at 15:28

## 2023-01-01 RX ADMIN — PANTOPRAZOLE SODIUM 40 MG: 40 TABLET, DELAYED RELEASE ORAL at 08:28

## 2023-01-01 RX ADMIN — DICLOFENAC SODIUM 2 G: 10 GEL TOPICAL at 14:55

## 2023-01-01 RX ADMIN — PANTOPRAZOLE SODIUM 40 MG: 40 TABLET, DELAYED RELEASE ORAL at 08:46

## 2023-01-01 RX ADMIN — HYDRALAZINE HYDROCHLORIDE 50 MG: 50 TABLET, FILM COATED ORAL at 13:54

## 2023-01-01 RX ADMIN — MAGNESIUM OXIDE TAB 400 MG (240 MG ELEMENTAL MG) 400 MG: 400 (240 MG) TAB at 00:08

## 2023-01-01 RX ADMIN — POTASSIUM CHLORIDE 60 MEQ: 1500 TABLET, EXTENDED RELEASE ORAL at 13:20

## 2023-01-01 RX ADMIN — HYDRALAZINE HYDROCHLORIDE 50 MG: 50 TABLET, FILM COATED ORAL at 14:09

## 2023-01-01 RX ADMIN — MAGNESIUM OXIDE TAB 400 MG (241.3 MG ELEMENTAL MG) 400 MG: 400 (241.3 MG) TAB at 08:24

## 2023-01-01 RX ADMIN — BUMETANIDE 5 MG: 0.25 INJECTION INTRAMUSCULAR; INTRAVENOUS at 16:05

## 2023-01-01 RX ADMIN — ALLOPURINOL 200 MG: 100 TABLET ORAL at 08:28

## 2023-01-01 RX ADMIN — PAROXETINE 20 MG: 20 TABLET, FILM COATED ORAL at 07:37

## 2023-01-01 RX ADMIN — Medication 2 MG/HR: at 09:44

## 2023-01-01 RX ADMIN — ISOSORBIDE MONONITRATE 120 MG: 60 TABLET, EXTENDED RELEASE ORAL at 08:40

## 2023-01-01 RX ADMIN — CEFEPIME HYDROCHLORIDE 1 G: 1 INJECTION, POWDER, FOR SOLUTION INTRAMUSCULAR; INTRAVENOUS at 21:27

## 2023-01-01 RX ADMIN — PANTOPRAZOLE SODIUM 40 MG: 40 TABLET, DELAYED RELEASE ORAL at 08:15

## 2023-01-01 RX ADMIN — POTASSIUM CHLORIDE 20 MEQ: 750 TABLET, EXTENDED RELEASE ORAL at 23:54

## 2023-01-01 RX ADMIN — BUMETANIDE 2 MG/HR: 0.25 INJECTION INTRAMUSCULAR; INTRAVENOUS at 17:17

## 2023-01-01 RX ADMIN — MAGNESIUM OXIDE TAB 400 MG (241.3 MG ELEMENTAL MG) 400 MG: 400 (241.3 MG) TAB at 10:53

## 2023-01-01 RX ADMIN — METOLAZONE 2.5 MG: 2.5 TABLET ORAL at 11:46

## 2023-01-01 RX ADMIN — CIPROFLOXACIN HYDROCHLORIDE 500 MG: 500 TABLET, FILM COATED ORAL at 08:12

## 2023-01-01 RX ADMIN — MAGNESIUM OXIDE TAB 400 MG (241.3 MG ELEMENTAL MG) 400 MG: 400 (241.3 MG) TAB at 17:57

## 2023-01-01 RX ADMIN — PAROXETINE HYDROCHLORIDE 20 MG: 10 TABLET, FILM COATED ORAL at 09:20

## 2023-01-01 RX ADMIN — DAPTOMYCIN 500 MG: 500 INJECTION, POWDER, LYOPHILIZED, FOR SOLUTION INTRAVENOUS at 16:30

## 2023-01-01 RX ADMIN — ALLOPURINOL 200 MG: 100 TABLET ORAL at 09:06

## 2023-01-01 RX ADMIN — FINASTERIDE 5 MG: 5 TABLET, FILM COATED ORAL at 08:27

## 2023-01-01 RX ADMIN — POTASSIUM CHLORIDE 40 MEQ: 750 TABLET, EXTENDED RELEASE ORAL at 08:23

## 2023-01-01 RX ADMIN — TORSEMIDE 100 MG: 100 TABLET ORAL at 09:00

## 2023-01-01 RX ADMIN — POTASSIUM CHLORIDE 40 MEQ: 750 TABLET, EXTENDED RELEASE ORAL at 10:10

## 2023-01-01 RX ADMIN — CIPROFLOXACIN HYDROCHLORIDE 500 MG: 500 TABLET, FILM COATED ORAL at 10:52

## 2023-01-01 RX ADMIN — POTASSIUM CHLORIDE 60 MEQ: 1500 TABLET, EXTENDED RELEASE ORAL at 19:00

## 2023-01-01 RX ADMIN — POTASSIUM CHLORIDE 80 MEQ: 1500 TABLET, EXTENDED RELEASE ORAL at 14:00

## 2023-01-01 RX ADMIN — FERROUS SULFATE TAB 325 MG (65 MG ELEMENTAL FE) 325 MG: 325 (65 FE) TAB at 07:34

## 2023-01-01 RX ADMIN — MAGNESIUM OXIDE TAB 400 MG (241.3 MG ELEMENTAL MG) 400 MG: 400 (241.3 MG) TAB at 20:37

## 2023-01-01 RX ADMIN — FINASTERIDE 5 MG: 5 TABLET, FILM COATED ORAL at 08:13

## 2023-01-01 RX ADMIN — WARFARIN SODIUM 2 MG: 1 TABLET ORAL at 17:47

## 2023-01-01 RX ADMIN — WARFARIN SODIUM 3 MG: 3 TABLET ORAL at 18:09

## 2023-01-01 RX ADMIN — BUMETANIDE 2 MG/HR: 0.25 INJECTION INTRAMUSCULAR; INTRAVENOUS at 23:28

## 2023-01-01 RX ADMIN — MAGNESIUM OXIDE TAB 400 MG (241.3 MG ELEMENTAL MG) 400 MG: 400 (241.3 MG) TAB at 12:49

## 2023-01-01 RX ADMIN — TAMSULOSIN HYDROCHLORIDE 0.8 MG: 0.4 CAPSULE ORAL at 20:33

## 2023-01-01 RX ADMIN — ZOLPIDEM TARTRATE 5 MG: 5 TABLET, FILM COATED ORAL at 21:58

## 2023-01-01 RX ADMIN — POTASSIUM CHLORIDE 10 MEQ: 7.46 INJECTION, SOLUTION INTRAVENOUS at 11:40

## 2023-01-01 RX ADMIN — AMIODARONE HYDROCHLORIDE 200 MG: 200 TABLET ORAL at 09:20

## 2023-01-01 RX ADMIN — VANCOMYCIN HYDROCHLORIDE 125 MG: KIT at 08:36

## 2023-01-01 RX ADMIN — CILOSTAZOL 50 MG: 50 TABLET ORAL at 16:48

## 2023-01-01 RX ADMIN — INSULIN ASPART 1 UNITS: 100 INJECTION, SOLUTION INTRAVENOUS; SUBCUTANEOUS at 11:16

## 2023-01-01 RX ADMIN — CIPROFLOXACIN 750 MG: 250 TABLET, FILM COATED ORAL at 21:00

## 2023-01-01 RX ADMIN — WARFARIN SODIUM 2.5 MG: 2.5 TABLET ORAL at 17:13

## 2023-01-01 RX ADMIN — INSULIN ASPART 2 UNITS: 100 INJECTION, SOLUTION INTRAVENOUS; SUBCUTANEOUS at 17:38

## 2023-01-01 RX ADMIN — HYDRALAZINE HYDROCHLORIDE 25 MG: 25 TABLET, FILM COATED ORAL at 05:08

## 2023-01-01 RX ADMIN — LEVOTHYROXINE SODIUM 100 MCG: 100 TABLET ORAL at 08:03

## 2023-01-01 RX ADMIN — MIDAZOLAM 1 MG: 1 INJECTION INTRAMUSCULAR; INTRAVENOUS at 09:55

## 2023-01-01 RX ADMIN — Medication 400 MG: at 11:53

## 2023-01-01 RX ADMIN — HYDRALAZINE HYDROCHLORIDE 100 MG: 100 TABLET, FILM COATED ORAL at 13:19

## 2023-01-01 RX ADMIN — SULFAMETHOXAZOLE AND TRIMETHOPRIM 1 TABLET: 400; 80 TABLET ORAL at 16:09

## 2023-01-01 RX ADMIN — INSULIN ASPART 1 UNITS: 100 INJECTION, SOLUTION INTRAVENOUS; SUBCUTANEOUS at 13:42

## 2023-01-01 RX ADMIN — Medication 400 MG: at 09:39

## 2023-01-01 RX ADMIN — TORSEMIDE 100 MG: 100 TABLET ORAL at 13:59

## 2023-01-01 RX ADMIN — B-COMPLEX W/ C & FOLIC ACID TAB 1 MG 1 TABLET: 1 TAB at 07:47

## 2023-01-01 RX ADMIN — HYDRALAZINE HYDROCHLORIDE 50 MG: 50 TABLET, FILM COATED ORAL at 07:41

## 2023-01-01 RX ADMIN — AMLODIPINE BESYLATE 10 MG: 10 TABLET ORAL at 07:24

## 2023-01-01 RX ADMIN — HYDRALAZINE HYDROCHLORIDE 25 MG: 25 TABLET, FILM COATED ORAL at 12:01

## 2023-01-01 RX ADMIN — POTASSIUM CHLORIDE 20 MEQ: 750 TABLET, EXTENDED RELEASE ORAL at 18:16

## 2023-01-01 RX ADMIN — AMIODARONE HYDROCHLORIDE 200 MG: 200 TABLET ORAL at 08:09

## 2023-01-01 RX ADMIN — DOBUTAMINE HYDROCHLORIDE 5 MCG/KG/MIN: 200 INJECTION INTRAVENOUS at 22:37

## 2023-01-01 RX ADMIN — POTASSIUM CHLORIDE 80 MEQ: 1500 TABLET, EXTENDED RELEASE ORAL at 20:28

## 2023-01-01 RX ADMIN — B-COMPLEX W/ C & FOLIC ACID TAB 1 MG 1 TABLET: 1 TAB at 08:04

## 2023-01-01 RX ADMIN — POTASSIUM CHLORIDE 40 MEQ: 750 TABLET, EXTENDED RELEASE ORAL at 07:40

## 2023-01-01 RX ADMIN — BUMETANIDE 8 MG: 0.25 INJECTION INTRAMUSCULAR; INTRAVENOUS at 18:48

## 2023-01-01 RX ADMIN — WARFARIN SODIUM 3 MG: 3 TABLET ORAL at 17:08

## 2023-01-01 RX ADMIN — POTASSIUM CHLORIDE 100 MEQ: 1500 TABLET, EXTENDED RELEASE ORAL at 06:47

## 2023-01-01 RX ADMIN — AMIODARONE HYDROCHLORIDE 200 MG: 200 TABLET ORAL at 07:46

## 2023-01-01 RX ADMIN — WARFARIN SODIUM 3 MG: 3 TABLET ORAL at 19:00

## 2023-01-01 RX ADMIN — BUMETANIDE 5 MG: 0.25 INJECTION, SOLUTION INTRAMUSCULAR; INTRAVENOUS at 13:54

## 2023-01-01 RX ADMIN — POTASSIUM CHLORIDE 60 MEQ: 1500 TABLET, EXTENDED RELEASE ORAL at 08:41

## 2023-01-01 RX ADMIN — PANTOPRAZOLE SODIUM 40 MG: 40 TABLET, DELAYED RELEASE ORAL at 10:54

## 2023-01-01 RX ADMIN — HYDRALAZINE HYDROCHLORIDE 50 MG: 50 TABLET, FILM COATED ORAL at 07:40

## 2023-01-01 RX ADMIN — CHLOROTHIAZIDE SODIUM 1000 MG: 500 INJECTION, POWDER, LYOPHILIZED, FOR SOLUTION INTRAVENOUS at 12:59

## 2023-01-01 RX ADMIN — ACETAMINOPHEN 975 MG: 325 TABLET, FILM COATED ORAL at 11:52

## 2023-01-01 RX ADMIN — INSULIN ASPART 1 UNITS: 100 INJECTION, SOLUTION INTRAVENOUS; SUBCUTANEOUS at 17:34

## 2023-01-01 RX ADMIN — ALLOPURINOL 200 MG: 100 TABLET ORAL at 07:38

## 2023-01-01 RX ADMIN — TORSEMIDE 100 MG: 100 TABLET ORAL at 20:43

## 2023-01-01 RX ADMIN — AMIODARONE HYDROCHLORIDE 200 MG: 200 TABLET ORAL at 08:23

## 2023-01-01 RX ADMIN — WHITE PETROLATUM: 1.75 OINTMENT TOPICAL at 08:52

## 2023-01-01 RX ADMIN — CIPROFLOXACIN HYDROCHLORIDE 500 MG: 500 TABLET, FILM COATED ORAL at 19:51

## 2023-01-01 RX ADMIN — PANTOPRAZOLE SODIUM 40 MG: 40 TABLET, DELAYED RELEASE ORAL at 08:08

## 2023-01-01 RX ADMIN — INSULIN ASPART 1 UNITS: 100 INJECTION, SOLUTION INTRAVENOUS; SUBCUTANEOUS at 09:07

## 2023-01-01 RX ADMIN — WARFARIN SODIUM 1.5 MG: 3 TABLET ORAL at 18:02

## 2023-01-01 RX ADMIN — POTASSIUM CHLORIDE 80 MEQ: 1500 TABLET, EXTENDED RELEASE ORAL at 09:39

## 2023-01-01 RX ADMIN — FINASTERIDE 5 MG: 5 TABLET, FILM COATED ORAL at 07:37

## 2023-01-01 RX ADMIN — FINASTERIDE 5 MG: 5 TABLET, FILM COATED ORAL at 07:24

## 2023-01-01 RX ADMIN — ACETAMINOPHEN 975 MG: 325 TABLET, FILM COATED ORAL at 15:31

## 2023-01-01 RX ADMIN — PANTOPRAZOLE SODIUM 40 MG: 40 TABLET, DELAYED RELEASE ORAL at 16:45

## 2023-01-01 RX ADMIN — TAMSULOSIN HYDROCHLORIDE 0.8 MG: 0.4 CAPSULE ORAL at 08:17

## 2023-01-01 RX ADMIN — PANTOPRAZOLE SODIUM 40 MG: 40 TABLET, DELAYED RELEASE ORAL at 08:09

## 2023-01-01 RX ADMIN — POTASSIUM CHLORIDE 40 MEQ: 750 TABLET, EXTENDED RELEASE ORAL at 20:05

## 2023-01-01 RX ADMIN — METOLAZONE 2.5 MG: 2.5 TABLET ORAL at 18:34

## 2023-01-01 RX ADMIN — HYDRALAZINE HYDROCHLORIDE 100 MG: 100 TABLET ORAL at 07:33

## 2023-01-01 RX ADMIN — MAGNESIUM OXIDE TAB 400 MG (241.3 MG ELEMENTAL MG) 400 MG: 400 (241.3 MG) TAB at 20:05

## 2023-01-01 RX ADMIN — PAROXETINE 20 MG: 20 TABLET, FILM COATED ORAL at 08:31

## 2023-01-01 RX ADMIN — DICLOFENAC SODIUM 4 G: 10 GEL TOPICAL at 11:01

## 2023-01-01 RX ADMIN — Medication 2 MG/HR: at 11:01

## 2023-01-01 RX ADMIN — MAGNESIUM OXIDE TAB 400 MG (241.3 MG ELEMENTAL MG) 400 MG: 400 (241.3 MG) TAB at 22:25

## 2023-01-01 RX ADMIN — AMLODIPINE BESYLATE 10 MG: 10 TABLET ORAL at 07:38

## 2023-01-01 RX ADMIN — FINASTERIDE 5 MG: 5 TABLET, FILM COATED ORAL at 08:03

## 2023-01-01 RX ADMIN — HYDRALAZINE HYDROCHLORIDE 100 MG: 100 TABLET ORAL at 20:29

## 2023-01-01 RX ADMIN — WHITE PETROLATUM: 1.75 OINTMENT TOPICAL at 21:41

## 2023-01-01 RX ADMIN — INSULIN ASPART 1 UNITS: 100 INJECTION, SOLUTION INTRAVENOUS; SUBCUTANEOUS at 16:04

## 2023-01-01 RX ADMIN — POTASSIUM CHLORIDE 60 MEQ: 1500 TABLET, EXTENDED RELEASE ORAL at 20:12

## 2023-01-01 RX ADMIN — LEVOTHYROXINE SODIUM 100 MCG: 100 TABLET ORAL at 08:19

## 2023-01-01 RX ADMIN — BUMETANIDE 2 MG/HR: 0.25 INJECTION INTRAMUSCULAR; INTRAVENOUS at 12:18

## 2023-01-01 RX ADMIN — HYDRALAZINE HYDROCHLORIDE 25 MG: 25 TABLET, FILM COATED ORAL at 13:54

## 2023-01-01 RX ADMIN — HYDRALAZINE HYDROCHLORIDE 50 MG: 50 TABLET, FILM COATED ORAL at 20:26

## 2023-01-01 RX ADMIN — FERROUS SULFATE TAB 325 MG (65 MG ELEMENTAL FE) 325 MG: 325 (65 FE) TAB at 09:01

## 2023-01-01 RX ADMIN — MAGNESIUM OXIDE TAB 400 MG (240 MG ELEMENTAL MG) 400 MG: 400 (240 MG) TAB at 07:44

## 2023-01-01 RX ADMIN — BUMETANIDE 2 MG/HR: 0.25 INJECTION INTRAMUSCULAR; INTRAVENOUS at 16:52

## 2023-01-01 RX ADMIN — WARFARIN SODIUM 2 MG: 1 TABLET ORAL at 17:24

## 2023-01-01 RX ADMIN — VANCOMYCIN HYDROCHLORIDE 125 MG: KIT at 17:35

## 2023-01-01 RX ADMIN — Medication 2 MG/HR: at 20:23

## 2023-01-01 RX ADMIN — ACETAMINOPHEN 650 MG: 325 TABLET, FILM COATED ORAL at 04:30

## 2023-01-01 RX ADMIN — HYDRALAZINE HYDROCHLORIDE 50 MG: 50 TABLET, FILM COATED ORAL at 14:45

## 2023-01-01 RX ADMIN — MAGNESIUM SULFATE HEPTAHYDRATE 2 G: 40 INJECTION, SOLUTION INTRAVENOUS at 09:14

## 2023-01-01 RX ADMIN — BUMETANIDE 2 MG/HR: 0.25 INJECTION INTRAMUSCULAR; INTRAVENOUS at 03:15

## 2023-01-01 RX ADMIN — MAGNESIUM SULFATE IN WATER 2 G: 40 INJECTION, SOLUTION INTRAVENOUS at 02:14

## 2023-01-01 RX ADMIN — PAROXETINE HYDROCHLORIDE 20 MG: 10 TABLET, FILM COATED ORAL at 08:23

## 2023-01-01 RX ADMIN — BUMETANIDE 2 MG/HR: 0.25 INJECTION INTRAMUSCULAR; INTRAVENOUS at 17:10

## 2023-01-01 RX ADMIN — PAROXETINE HYDROCHLORIDE 10 MG: 10 TABLET, FILM COATED ORAL at 08:57

## 2023-01-01 RX ADMIN — WARFARIN SODIUM 2 MG: 1 TABLET ORAL at 17:55

## 2023-01-01 RX ADMIN — MAGNESIUM OXIDE TAB 400 MG (241.3 MG ELEMENTAL MG) 400 MG: 400 (241.3 MG) TAB at 20:00

## 2023-01-01 RX ADMIN — CEFEPIME HYDROCHLORIDE 2 G: 2 INJECTION, POWDER, FOR SOLUTION INTRAVENOUS at 09:15

## 2023-01-01 RX ADMIN — LEVOTHYROXINE SODIUM 88 MCG: 88 TABLET ORAL at 07:24

## 2023-01-01 RX ADMIN — SPIRONOLACTONE 25 MG: 25 TABLET, FILM COATED ORAL at 08:17

## 2023-01-01 RX ADMIN — ISOSORBIDE MONONITRATE 120 MG: 60 TABLET, EXTENDED RELEASE ORAL at 08:07

## 2023-01-01 RX ADMIN — MAGNESIUM OXIDE TAB 400 MG (241.3 MG ELEMENTAL MG) 400 MG: 400 (241.3 MG) TAB at 18:08

## 2023-01-01 RX ADMIN — PANTOPRAZOLE SODIUM 40 MG: 40 TABLET, DELAYED RELEASE ORAL at 09:11

## 2023-01-01 RX ADMIN — PAROXETINE HYDROCHLORIDE 20 MG: 10 TABLET, FILM COATED ORAL at 08:25

## 2023-01-01 RX ADMIN — LEVOTHYROXINE SODIUM 88 MCG: 88 TABLET ORAL at 08:26

## 2023-01-01 RX ADMIN — ASPIRIN 81 MG: 81 TABLET ORAL at 07:40

## 2023-01-01 RX ADMIN — Medication 1 CAPSULE: at 08:36

## 2023-01-01 RX ADMIN — POTASSIUM CHLORIDE 20 MEQ: 750 TABLET, EXTENDED RELEASE ORAL at 08:03

## 2023-01-01 RX ADMIN — METHOCARBAMOL 500 MG: 500 TABLET ORAL at 19:48

## 2023-01-01 RX ADMIN — MAGNESIUM OXIDE TAB 400 MG (241.3 MG ELEMENTAL MG) 400 MG: 400 (241.3 MG) TAB at 08:37

## 2023-01-01 RX ADMIN — LEVOTHYROXINE SODIUM 100 MCG: 100 TABLET ORAL at 10:53

## 2023-01-01 RX ADMIN — HYDRALAZINE HYDROCHLORIDE 50 MG: 50 TABLET, FILM COATED ORAL at 09:20

## 2023-01-01 RX ADMIN — HYDRALAZINE HYDROCHLORIDE 50 MG: 50 TABLET, FILM COATED ORAL at 19:00

## 2023-01-01 RX ADMIN — POTASSIUM CHLORIDE 80 MEQ: 1500 TABLET, EXTENDED RELEASE ORAL at 13:54

## 2023-01-01 RX ADMIN — MAGNESIUM OXIDE TAB 400 MG (240 MG ELEMENTAL MG) 400 MG: 400 (240 MG) TAB at 19:55

## 2023-01-01 RX ADMIN — POTASSIUM CHLORIDE 60 MEQ: 1500 TABLET, EXTENDED RELEASE ORAL at 13:30

## 2023-01-01 RX ADMIN — POTASSIUM CHLORIDE 80 MEQ: 1500 TABLET, EXTENDED RELEASE ORAL at 16:41

## 2023-01-01 RX ADMIN — Medication 12.5 MG: at 21:41

## 2023-01-01 RX ADMIN — PANTOPRAZOLE SODIUM 40 MG: 40 TABLET, DELAYED RELEASE ORAL at 09:01

## 2023-01-01 RX ADMIN — CEFEPIME HYDROCHLORIDE 1 G: 1 INJECTION, POWDER, FOR SOLUTION INTRAMUSCULAR; INTRAVENOUS at 19:17

## 2023-01-01 RX ADMIN — POTASSIUM CHLORIDE 80 MEQ: 1500 TABLET, EXTENDED RELEASE ORAL at 19:58

## 2023-01-01 RX ADMIN — ZOLPIDEM TARTRATE 5 MG: 5 TABLET ORAL at 21:59

## 2023-01-01 RX ADMIN — POTASSIUM CHLORIDE 60 MEQ: 1500 TABLET, EXTENDED RELEASE ORAL at 13:48

## 2023-01-01 RX ADMIN — WARFARIN SODIUM 4 MG: 4 TABLET ORAL at 17:48

## 2023-01-01 RX ADMIN — HYDRALAZINE HYDROCHLORIDE 25 MG: 25 TABLET, FILM COATED ORAL at 15:22

## 2023-01-01 RX ADMIN — POTASSIUM CHLORIDE 20 MEQ: 1500 TABLET, EXTENDED RELEASE ORAL at 08:20

## 2023-01-01 RX ADMIN — FERROUS SULFATE TAB 325 MG (65 MG ELEMENTAL FE) 325 MG: 325 (65 FE) TAB at 08:40

## 2023-01-01 RX ADMIN — HYDRALAZINE HYDROCHLORIDE 25 MG: 25 TABLET, FILM COATED ORAL at 16:17

## 2023-01-01 RX ADMIN — DOBUTAMINE HYDROCHLORIDE 2.5 MCG/KG/MIN: 200 INJECTION INTRAVENOUS at 17:14

## 2023-01-01 RX ADMIN — PAROXETINE HYDROCHLORIDE 20 MG: 10 TABLET, FILM COATED ORAL at 08:15

## 2023-01-01 RX ADMIN — MEROPENEM 500 MG: 500 INJECTION, POWDER, FOR SOLUTION INTRAVENOUS at 08:31

## 2023-01-01 RX ADMIN — INSULIN ASPART 1 UNITS: 100 INJECTION, SOLUTION INTRAVENOUS; SUBCUTANEOUS at 12:09

## 2023-01-01 RX ADMIN — METHOCARBAMOL 500 MG: 500 TABLET ORAL at 07:40

## 2023-01-01 RX ADMIN — AMIODARONE HYDROCHLORIDE 200 MG: 200 TABLET ORAL at 08:37

## 2023-01-01 RX ADMIN — ASPIRIN 81 MG: 81 TABLET ORAL at 10:40

## 2023-01-01 RX ADMIN — ASPIRIN 81 MG: 81 TABLET ORAL at 07:37

## 2023-01-01 RX ADMIN — ACETAZOLAMIDE SODIUM 500 MG: 500 INJECTION, POWDER, LYOPHILIZED, FOR SOLUTION INTRAVENOUS at 19:41

## 2023-01-01 RX ADMIN — PANTOPRAZOLE SODIUM 40 MG: 40 TABLET, DELAYED RELEASE ORAL at 08:01

## 2023-01-01 RX ADMIN — POTASSIUM CHLORIDE 60 MEQ: 1500 TABLET, EXTENDED RELEASE ORAL at 11:23

## 2023-01-01 RX ADMIN — POTASSIUM CHLORIDE 40 MEQ: 750 TABLET, EXTENDED RELEASE ORAL at 07:35

## 2023-01-01 RX ADMIN — POTASSIUM CHLORIDE 100 MEQ: 1500 TABLET, EXTENDED RELEASE ORAL at 20:03

## 2023-01-01 RX ADMIN — LEVOTHYROXINE SODIUM 100 MCG: 100 TABLET ORAL at 08:21

## 2023-01-01 RX ADMIN — DAPAGLIFLOZIN 5 MG: 5 TABLET, FILM COATED ORAL at 08:04

## 2023-01-01 RX ADMIN — POTASSIUM CHLORIDE 20 MEQ: 750 TABLET, EXTENDED RELEASE ORAL at 22:05

## 2023-01-01 RX ADMIN — AMLODIPINE BESYLATE 10 MG: 10 TABLET ORAL at 08:30

## 2023-01-01 RX ADMIN — POTASSIUM CHLORIDE 100 MEQ: 1500 TABLET, EXTENDED RELEASE ORAL at 08:26

## 2023-01-01 RX ADMIN — PANTOPRAZOLE SODIUM 40 MG: 40 TABLET, DELAYED RELEASE ORAL at 16:35

## 2023-01-01 RX ADMIN — METHOCARBAMOL 500 MG: 500 TABLET ORAL at 13:59

## 2023-01-01 RX ADMIN — METHOCARBAMOL 500 MG: 500 TABLET ORAL at 08:13

## 2023-01-01 RX ADMIN — WARFARIN SODIUM 1 MG: 1 TABLET ORAL at 18:30

## 2023-01-01 RX ADMIN — HYDRALAZINE HYDROCHLORIDE 100 MG: 100 TABLET ORAL at 19:58

## 2023-01-01 RX ADMIN — LEVOTHYROXINE SODIUM 100 MCG: 100 TABLET ORAL at 08:01

## 2023-01-01 RX ADMIN — TORSEMIDE 100 MG: 100 TABLET ORAL at 14:29

## 2023-01-01 RX ADMIN — ISOSORBIDE MONONITRATE 90 MG: 60 TABLET, EXTENDED RELEASE ORAL at 07:50

## 2023-01-01 RX ADMIN — HYDROCHLOROTHIAZIDE 100 MG: 25 TABLET ORAL at 08:12

## 2023-01-01 RX ADMIN — HYDRALAZINE HYDROCHLORIDE 50 MG: 50 TABLET, FILM COATED ORAL at 13:15

## 2023-01-01 RX ADMIN — CHLOROTHIAZIDE SODIUM 1000 MG: 500 INJECTION, POWDER, LYOPHILIZED, FOR SOLUTION INTRAVENOUS at 10:08

## 2023-01-01 RX ADMIN — DOBUTAMINE HYDROCHLORIDE 5 MCG/KG/MIN: 200 INJECTION INTRAVENOUS at 02:29

## 2023-01-01 RX ADMIN — WHITE PETROLATUM: 1.75 OINTMENT TOPICAL at 09:06

## 2023-01-01 RX ADMIN — CHLOROTHIAZIDE SODIUM 1000 MG: 500 INJECTION, POWDER, LYOPHILIZED, FOR SOLUTION INTRAVENOUS at 10:18

## 2023-01-01 RX ADMIN — Medication 3 MG: at 21:24

## 2023-01-01 RX ADMIN — MAGNESIUM OXIDE TAB 400 MG (240 MG ELEMENTAL MG) 400 MG: 400 (240 MG) TAB at 08:12

## 2023-01-01 RX ADMIN — POTASSIUM CHLORIDE 10 MEQ: 750 TABLET, EXTENDED RELEASE ORAL at 11:26

## 2023-01-01 RX ADMIN — CEFEPIME HYDROCHLORIDE 1 G: 1 INJECTION, POWDER, FOR SOLUTION INTRAMUSCULAR; INTRAVENOUS at 20:35

## 2023-01-01 RX ADMIN — POTASSIUM CHLORIDE 20 MEQ: 750 TABLET, EXTENDED RELEASE ORAL at 08:34

## 2023-01-01 RX ADMIN — WARFARIN SODIUM 3 MG: 3 TABLET ORAL at 21:01

## 2023-01-01 RX ADMIN — CILOSTAZOL 100 MG: 100 TABLET ORAL at 15:56

## 2023-01-01 RX ADMIN — Medication 1 CAPSULE: at 21:30

## 2023-01-01 RX ADMIN — HYDROCHLOROTHIAZIDE 100 MG: 25 TABLET ORAL at 08:13

## 2023-01-01 RX ADMIN — MEROPENEM 500 MG: 500 INJECTION, POWDER, FOR SOLUTION INTRAVENOUS at 20:26

## 2023-01-01 RX ADMIN — AMIODARONE HYDROCHLORIDE 200 MG: 200 TABLET ORAL at 08:18

## 2023-01-01 RX ADMIN — PANTOPRAZOLE SODIUM 40 MG: 40 TABLET, DELAYED RELEASE ORAL at 08:26

## 2023-01-01 RX ADMIN — ALLOPURINOL 200 MG: 100 TABLET ORAL at 08:39

## 2023-01-01 RX ADMIN — PANTOPRAZOLE SODIUM 40 MG: 40 TABLET, DELAYED RELEASE ORAL at 08:22

## 2023-01-01 RX ADMIN — LEVOTHYROXINE SODIUM 100 MCG: 100 TABLET ORAL at 08:25

## 2023-01-01 RX ADMIN — DAPTOMYCIN 500 MG: 500 INJECTION, POWDER, LYOPHILIZED, FOR SOLUTION INTRAVENOUS at 11:19

## 2023-01-01 RX ADMIN — MAGNESIUM OXIDE TAB 400 MG (241.3 MG ELEMENTAL MG) 400 MG: 400 (241.3 MG) TAB at 14:28

## 2023-01-01 RX ADMIN — TAMSULOSIN HYDROCHLORIDE 0.8 MG: 0.4 CAPSULE ORAL at 20:27

## 2023-01-01 RX ADMIN — ISOSORBIDE MONONITRATE 120 MG: 60 TABLET, EXTENDED RELEASE ORAL at 08:13

## 2023-01-01 RX ADMIN — MAGNESIUM OXIDE TAB 400 MG (241.3 MG ELEMENTAL MG) 400 MG: 400 (241.3 MG) TAB at 09:06

## 2023-01-01 RX ADMIN — Medication 12.5 MG: at 21:06

## 2023-01-01 RX ADMIN — CALCIUM GLUCONATE 1 G: 20 INJECTION, SOLUTION INTRAVENOUS at 22:04

## 2023-01-01 RX ADMIN — AMIODARONE HYDROCHLORIDE 200 MG: 200 TABLET ORAL at 08:47

## 2023-01-01 RX ADMIN — AMLODIPINE BESYLATE 10 MG: 10 TABLET ORAL at 08:12

## 2023-01-01 RX ADMIN — POTASSIUM CHLORIDE 20 MEQ: 750 TABLET, EXTENDED RELEASE ORAL at 08:30

## 2023-01-01 RX ADMIN — HYDRALAZINE HYDROCHLORIDE 25 MG: 25 TABLET, FILM COATED ORAL at 15:12

## 2023-01-01 RX ADMIN — ISOSORBIDE MONONITRATE 120 MG: 60 TABLET, EXTENDED RELEASE ORAL at 10:53

## 2023-01-01 RX ADMIN — INSULIN ASPART 1 UNITS: 100 INJECTION, SOLUTION INTRAVENOUS; SUBCUTANEOUS at 18:02

## 2023-01-01 RX ADMIN — CHLOROTHIAZIDE SODIUM 1000 MG: 500 INJECTION, POWDER, LYOPHILIZED, FOR SOLUTION INTRAVENOUS at 16:18

## 2023-01-01 RX ADMIN — HYDRALAZINE HYDROCHLORIDE 25 MG: 25 TABLET, FILM COATED ORAL at 21:06

## 2023-01-01 RX ADMIN — ACETAZOLAMIDE SODIUM 500 MG: 500 INJECTION, POWDER, LYOPHILIZED, FOR SOLUTION INTRAVENOUS at 13:59

## 2023-01-01 RX ADMIN — FINASTERIDE 5 MG: 5 TABLET, FILM COATED ORAL at 08:15

## 2023-01-01 RX ADMIN — MAGNESIUM OXIDE TAB 400 MG (241.3 MG ELEMENTAL MG) 400 MG: 400 (241.3 MG) TAB at 12:09

## 2023-01-01 RX ADMIN — LEVOTHYROXINE SODIUM 88 MCG: 88 TABLET ORAL at 07:34

## 2023-01-01 RX ADMIN — METHOCARBAMOL 500 MG: 500 TABLET ORAL at 08:08

## 2023-01-01 RX ADMIN — CILOSTAZOL 50 MG: 50 TABLET ORAL at 15:45

## 2023-01-01 RX ADMIN — FERROUS SULFATE TAB 325 MG (65 MG ELEMENTAL FE) 325 MG: 325 (65 FE) TAB at 08:01

## 2023-01-01 RX ADMIN — Medication 1 TABLET: at 08:47

## 2023-01-01 RX ADMIN — BUMETANIDE 5 MG: 0.25 INJECTION INTRAMUSCULAR; INTRAVENOUS at 09:37

## 2023-01-01 RX ADMIN — Medication 400 MG: at 08:24

## 2023-01-01 RX ADMIN — METHOCARBAMOL 500 MG: 500 TABLET ORAL at 08:26

## 2023-01-01 RX ADMIN — CEFEPIME HYDROCHLORIDE 2 G: 2 INJECTION, POWDER, FOR SOLUTION INTRAVENOUS at 08:20

## 2023-01-01 RX ADMIN — POTASSIUM CHLORIDE 10 MEQ: 7.46 INJECTION, SOLUTION INTRAVENOUS at 09:22

## 2023-01-01 RX ADMIN — VANCOMYCIN HYDROCHLORIDE 125 MG: KIT at 15:33

## 2023-01-01 RX ADMIN — POTASSIUM CHLORIDE 20 MEQ: 29.8 INJECTION, SOLUTION INTRAVENOUS at 12:51

## 2023-01-01 RX ADMIN — MAGNESIUM OXIDE TAB 400 MG (240 MG ELEMENTAL MG) 400 MG: 400 (240 MG) TAB at 08:17

## 2023-01-01 RX ADMIN — AMIODARONE HYDROCHLORIDE 200 MG: 200 TABLET ORAL at 08:27

## 2023-01-01 RX ADMIN — BUMETANIDE 4 MG: 0.25 INJECTION INTRAMUSCULAR; INTRAVENOUS at 21:14

## 2023-01-01 RX ADMIN — FERROUS SULFATE TAB 325 MG (65 MG ELEMENTAL FE) 325 MG: 325 (65 FE) TAB at 08:03

## 2023-01-01 RX ADMIN — ISOSORBIDE MONONITRATE 120 MG: 60 TABLET, EXTENDED RELEASE ORAL at 08:12

## 2023-01-01 RX ADMIN — Medication 2 MG/HR: at 02:44

## 2023-01-01 RX ADMIN — POTASSIUM CHLORIDE 100 MEQ: 1500 TABLET, EXTENDED RELEASE ORAL at 15:22

## 2023-01-01 RX ADMIN — FERROUS SULFATE TAB 325 MG (65 MG ELEMENTAL FE) 325 MG: 325 (65 FE) TAB at 09:11

## 2023-01-01 RX ADMIN — HYDRALAZINE HYDROCHLORIDE 100 MG: 100 TABLET ORAL at 14:22

## 2023-01-01 RX ADMIN — POTASSIUM CHLORIDE 10 MEQ: 7.46 INJECTION, SOLUTION INTRAVENOUS at 10:06

## 2023-01-01 RX ADMIN — ALLOPURINOL 200 MG: 100 TABLET ORAL at 09:12

## 2023-01-01 RX ADMIN — PANTOPRAZOLE SODIUM 40 MG: 40 TABLET, DELAYED RELEASE ORAL at 16:13

## 2023-01-01 RX ADMIN — ZOLPIDEM TARTRATE 5 MG: 5 TABLET ORAL at 21:54

## 2023-01-01 RX ADMIN — ALTEPLASE 2 MG: 2.2 INJECTION, POWDER, LYOPHILIZED, FOR SOLUTION INTRAVENOUS at 11:54

## 2023-01-01 RX ADMIN — INSULIN ASPART 3 UNITS: 100 INJECTION, SOLUTION INTRAVENOUS; SUBCUTANEOUS at 18:14

## 2023-01-01 RX ADMIN — ALLOPURINOL 200 MG: 100 TABLET ORAL at 09:05

## 2023-01-01 RX ADMIN — MAGNESIUM SULFATE IN WATER 2 G: 40 INJECTION, SOLUTION INTRAVENOUS at 20:21

## 2023-01-01 RX ADMIN — METHOCARBAMOL 500 MG: 500 TABLET ORAL at 10:39

## 2023-01-01 RX ADMIN — MAGNESIUM OXIDE TAB 400 MG (241.3 MG ELEMENTAL MG) 400 MG: 400 (241.3 MG) TAB at 13:47

## 2023-01-01 RX ADMIN — DAPAGLIFLOZIN 10 MG: 10 TABLET, FILM COATED ORAL at 07:47

## 2023-01-01 RX ADMIN — PAROXETINE 20 MG: 20 TABLET, FILM COATED ORAL at 08:30

## 2023-01-01 RX ADMIN — B-COMPLEX W/ C & FOLIC ACID TAB 1 MG 1 TABLET: 1 TAB at 08:30

## 2023-01-01 RX ADMIN — METOLAZONE 2.5 MG: 2.5 TABLET ORAL at 15:56

## 2023-01-01 RX ADMIN — PANTOPRAZOLE SODIUM 40 MG: 40 TABLET, DELAYED RELEASE ORAL at 09:00

## 2023-01-01 RX ADMIN — PAROXETINE HYDROCHLORIDE 20 MG: 10 TABLET, FILM COATED ORAL at 07:48

## 2023-01-01 RX ADMIN — HYDRALAZINE HYDROCHLORIDE 25 MG: 25 TABLET, FILM COATED ORAL at 21:13

## 2023-01-01 RX ADMIN — TORSEMIDE 50 MG: 20 TABLET ORAL at 20:03

## 2023-01-01 RX ADMIN — MAGNESIUM SULFATE IN WATER 4 G: 40 INJECTION, SOLUTION INTRAVENOUS at 08:14

## 2023-01-01 RX ADMIN — ASPIRIN 81 MG: 81 TABLET ORAL at 11:39

## 2023-01-01 RX ADMIN — CHLOROTHIAZIDE SODIUM 1000 MG: 500 INJECTION, POWDER, LYOPHILIZED, FOR SOLUTION INTRAVENOUS at 21:04

## 2023-01-01 RX ADMIN — ASPIRIN 81 MG: 81 TABLET ORAL at 07:39

## 2023-01-01 RX ADMIN — HYDRALAZINE HYDROCHLORIDE 50 MG: 50 TABLET, FILM COATED ORAL at 08:25

## 2023-01-01 RX ADMIN — FINASTERIDE 5 MG: 5 TABLET, FILM COATED ORAL at 11:39

## 2023-01-01 RX ADMIN — CEFEPIME HYDROCHLORIDE 2 G: 2 INJECTION, POWDER, FOR SOLUTION INTRAVENOUS at 14:47

## 2023-01-01 RX ADMIN — PAROXETINE 20 MG: 20 TABLET, FILM COATED ORAL at 08:23

## 2023-01-01 RX ADMIN — VANCOMYCIN HYDROCHLORIDE 125 MG: KIT at 09:38

## 2023-01-01 RX ADMIN — MAGNESIUM OXIDE TAB 400 MG (241.3 MG ELEMENTAL MG) 400 MG: 400 (241.3 MG) TAB at 11:12

## 2023-01-01 RX ADMIN — HYDRALAZINE HYDROCHLORIDE 100 MG: 100 TABLET ORAL at 22:16

## 2023-01-01 RX ADMIN — TORSEMIDE 100 MG: 100 TABLET ORAL at 20:17

## 2023-01-01 RX ADMIN — LOPERAMIDE HYDROCHLORIDE 2 MG: 2 CAPSULE ORAL at 12:56

## 2023-01-01 RX ADMIN — MAGNESIUM OXIDE TAB 400 MG (241.3 MG ELEMENTAL MG) 400 MG: 400 (241.3 MG) TAB at 20:16

## 2023-01-01 RX ADMIN — BUMETANIDE 2 MG/HR: 0.25 INJECTION INTRAMUSCULAR; INTRAVENOUS at 15:00

## 2023-01-01 RX ADMIN — ZOLPIDEM TARTRATE 5 MG: 5 TABLET ORAL at 21:50

## 2023-01-01 RX ADMIN — DAPAGLIFLOZIN 10 MG: 10 TABLET, FILM COATED ORAL at 08:09

## 2023-01-01 RX ADMIN — POTASSIUM CHLORIDE 20 MEQ: 29.8 INJECTION, SOLUTION INTRAVENOUS at 10:33

## 2023-01-01 RX ADMIN — POTASSIUM CHLORIDE 20 MEQ: 750 TABLET, EXTENDED RELEASE ORAL at 07:49

## 2023-01-01 RX ADMIN — MAGNESIUM OXIDE TAB 400 MG (241.3 MG ELEMENTAL MG) 400 MG: 400 (241.3 MG) TAB at 17:32

## 2023-01-01 RX ADMIN — ALLOPURINOL 200 MG: 100 TABLET ORAL at 09:00

## 2023-01-01 RX ADMIN — FINASTERIDE 5 MG: 5 TABLET, FILM COATED ORAL at 08:19

## 2023-01-01 RX ADMIN — FINASTERIDE 5 MG: 5 TABLET, FILM COATED ORAL at 08:41

## 2023-01-01 RX ADMIN — POTASSIUM CHLORIDE 80 MEQ: 1500 TABLET, EXTENDED RELEASE ORAL at 20:30

## 2023-01-01 RX ADMIN — PAROXETINE HYDROCHLORIDE 10 MG: 10 TABLET, FILM COATED ORAL at 08:17

## 2023-01-01 RX ADMIN — INSULIN ASPART 1 UNITS: 100 INJECTION, SOLUTION INTRAVENOUS; SUBCUTANEOUS at 09:05

## 2023-01-01 RX ADMIN — CEFEPIME HYDROCHLORIDE 1 G: 1 INJECTION, POWDER, FOR SOLUTION INTRAMUSCULAR; INTRAVENOUS at 17:34

## 2023-01-01 RX ADMIN — POTASSIUM CHLORIDE 60 MEQ: 1500 TABLET, EXTENDED RELEASE ORAL at 20:04

## 2023-01-01 RX ADMIN — POTASSIUM CHLORIDE 80 MEQ: 1500 TABLET, EXTENDED RELEASE ORAL at 19:16

## 2023-01-01 RX ADMIN — MEROPENEM 500 MG: 500 INJECTION, POWDER, FOR SOLUTION INTRAVENOUS at 20:14

## 2023-01-01 RX ADMIN — POTASSIUM CHLORIDE 60 MEQ: 1500 TABLET, EXTENDED RELEASE ORAL at 14:13

## 2023-01-01 RX ADMIN — WHITE PETROLATUM: 1.75 OINTMENT TOPICAL at 09:42

## 2023-01-01 RX ADMIN — PANTOPRAZOLE SODIUM 40 MG: 40 TABLET, DELAYED RELEASE ORAL at 16:48

## 2023-01-01 RX ADMIN — AMIODARONE HYDROCHLORIDE 200 MG: 200 TABLET ORAL at 07:48

## 2023-01-01 RX ADMIN — FERROUS SULFATE TAB 325 MG (65 MG ELEMENTAL FE) 325 MG: 325 (65 FE) TAB at 08:36

## 2023-01-01 RX ADMIN — MAGNESIUM OXIDE TAB 400 MG (241.3 MG ELEMENTAL MG) 400 MG: 400 (241.3 MG) TAB at 19:42

## 2023-01-01 RX ADMIN — FLUOXETINE 30 MG: 20 CAPSULE ORAL at 08:24

## 2023-01-01 RX ADMIN — TRAMADOL HYDROCHLORIDE 50 MG: 50 TABLET, COATED ORAL at 11:52

## 2023-01-01 RX ADMIN — PANTOPRAZOLE SODIUM 40 MG: 40 TABLET, DELAYED RELEASE ORAL at 08:47

## 2023-01-01 RX ADMIN — CEFEPIME HYDROCHLORIDE 2 G: 2 INJECTION, POWDER, FOR SOLUTION INTRAVENOUS at 08:05

## 2023-01-01 RX ADMIN — PANTOPRAZOLE SODIUM 40 MG: 40 TABLET, DELAYED RELEASE ORAL at 16:50

## 2023-01-01 RX ADMIN — POTASSIUM CHLORIDE 100 MEQ: 1500 TABLET, EXTENDED RELEASE ORAL at 14:38

## 2023-01-01 RX ADMIN — INSULIN ASPART 1 UNITS: 100 INJECTION, SOLUTION INTRAVENOUS; SUBCUTANEOUS at 17:42

## 2023-01-01 RX ADMIN — ACETAZOLAMIDE SODIUM 500 MG: 500 INJECTION, POWDER, LYOPHILIZED, FOR SOLUTION INTRAVENOUS at 21:50

## 2023-01-01 RX ADMIN — PAROXETINE HYDROCHLORIDE 10 MG: 10 TABLET, FILM COATED ORAL at 08:27

## 2023-01-01 RX ADMIN — POTASSIUM CHLORIDE 40 MEQ: 750 TABLET, EXTENDED RELEASE ORAL at 02:48

## 2023-01-01 RX ADMIN — PAROXETINE HYDROCHLORIDE 10 MG: 10 TABLET, FILM COATED ORAL at 08:23

## 2023-01-01 RX ADMIN — BUMETANIDE 2 MG/HR: 0.25 INJECTION INTRAMUSCULAR; INTRAVENOUS at 21:29

## 2023-01-01 RX ADMIN — CILOSTAZOL 100 MG: 100 TABLET ORAL at 11:39

## 2023-01-01 RX ADMIN — POTASSIUM CHLORIDE 100 MEQ: 1500 TABLET, EXTENDED RELEASE ORAL at 08:28

## 2023-01-01 RX ADMIN — AMLODIPINE BESYLATE 10 MG: 10 TABLET ORAL at 08:25

## 2023-01-01 RX ADMIN — METHOCARBAMOL 500 MG: 500 TABLET ORAL at 13:01

## 2023-01-01 RX ADMIN — POTASSIUM CHLORIDE 20 MEQ: 750 TABLET, EXTENDED RELEASE ORAL at 11:33

## 2023-01-01 RX ADMIN — MAGNESIUM OXIDE TAB 400 MG (241.3 MG ELEMENTAL MG) 400 MG: 400 (241.3 MG) TAB at 11:01

## 2023-01-01 RX ADMIN — CIPROFLOXACIN HYDROCHLORIDE 500 MG: 500 TABLET, FILM COATED ORAL at 20:25

## 2023-01-01 RX ADMIN — PAROXETINE 20 MG: 20 TABLET, FILM COATED ORAL at 08:21

## 2023-01-01 RX ADMIN — METHOCARBAMOL 500 MG: 500 TABLET ORAL at 19:31

## 2023-01-01 RX ADMIN — TAMSULOSIN HYDROCHLORIDE 0.8 MG: 0.4 CAPSULE ORAL at 19:50

## 2023-01-01 RX ADMIN — ZOLPIDEM TARTRATE 5 MG: 5 TABLET, FILM COATED ORAL at 00:21

## 2023-01-01 RX ADMIN — BUMETANIDE 2 MG/HR: 0.25 INJECTION INTRAMUSCULAR; INTRAVENOUS at 10:48

## 2023-01-01 RX ADMIN — TORSEMIDE 100 MG: 100 TABLET ORAL at 13:20

## 2023-01-01 RX ADMIN — PANTOPRAZOLE SODIUM 40 MG: 40 TABLET, DELAYED RELEASE ORAL at 16:39

## 2023-01-01 RX ADMIN — DICLOFENAC SODIUM 2 G: 10 GEL TOPICAL at 16:25

## 2023-01-01 RX ADMIN — HYDRALAZINE HYDROCHLORIDE 100 MG: 100 TABLET, FILM COATED ORAL at 08:30

## 2023-01-01 RX ADMIN — TAMSULOSIN HYDROCHLORIDE 0.8 MG: 0.4 CAPSULE ORAL at 20:16

## 2023-01-01 RX ADMIN — BUMETANIDE 2 MG/HR: 0.25 INJECTION INTRAMUSCULAR; INTRAVENOUS at 11:07

## 2023-01-01 RX ADMIN — CALCIUM CARBONATE 1000 MG: 500 TABLET, CHEWABLE ORAL at 09:21

## 2023-01-01 RX ADMIN — DICLOFENAC SODIUM 4 G: 10 GEL TOPICAL at 07:36

## 2023-01-01 RX ADMIN — ALLOPURINOL 200 MG: 100 TABLET ORAL at 08:27

## 2023-01-01 RX ADMIN — Medication 12.5 MG: at 22:10

## 2023-01-01 RX ADMIN — MAGNESIUM OXIDE TAB 400 MG (241.3 MG ELEMENTAL MG) 400 MG: 400 (241.3 MG) TAB at 15:22

## 2023-01-01 RX ADMIN — POTASSIUM CHLORIDE 20 MEQ: 750 TABLET, EXTENDED RELEASE ORAL at 10:33

## 2023-01-01 RX ADMIN — TAMSULOSIN HYDROCHLORIDE 0.8 MG: 0.4 CAPSULE ORAL at 08:13

## 2023-01-01 RX ADMIN — METHOCARBAMOL 500 MG: 500 TABLET ORAL at 08:22

## 2023-01-01 RX ADMIN — HYDRALAZINE HYDROCHLORIDE 100 MG: 100 TABLET ORAL at 08:17

## 2023-01-01 RX ADMIN — HYDRALAZINE HYDROCHLORIDE 100 MG: 100 TABLET ORAL at 13:43

## 2023-01-01 RX ADMIN — MAGNESIUM OXIDE TAB 400 MG (240 MG ELEMENTAL MG) 400 MG: 400 (240 MG) TAB at 07:47

## 2023-01-01 RX ADMIN — CHLOROTHIAZIDE SODIUM 1000 MG: 500 INJECTION, POWDER, LYOPHILIZED, FOR SOLUTION INTRAVENOUS at 21:42

## 2023-01-01 RX ADMIN — Medication 1 CAPSULE: at 21:54

## 2023-01-01 RX ADMIN — HYDRALAZINE HYDROCHLORIDE 100 MG: 100 TABLET ORAL at 19:45

## 2023-01-01 RX ADMIN — MAGNESIUM OXIDE TAB 400 MG (240 MG ELEMENTAL MG) 400 MG: 400 (240 MG) TAB at 19:58

## 2023-01-01 RX ADMIN — WARFARIN SODIUM 2 MG: 1 TABLET ORAL at 21:19

## 2023-01-01 RX ADMIN — PAROXETINE HYDROCHLORIDE 20 MG: 10 TABLET, FILM COATED ORAL at 08:08

## 2023-01-01 RX ADMIN — METHOCARBAMOL 500 MG: 500 TABLET ORAL at 13:30

## 2023-01-01 RX ADMIN — AMIODARONE HYDROCHLORIDE 200 MG: 200 TABLET ORAL at 08:31

## 2023-01-01 RX ADMIN — LEVOTHYROXINE SODIUM 88 MCG: 88 TABLET ORAL at 10:39

## 2023-01-01 RX ADMIN — POTASSIUM CHLORIDE 40 MEQ: 750 TABLET, EXTENDED RELEASE ORAL at 10:39

## 2023-01-01 RX ADMIN — ACETAMINOPHEN 975 MG: 325 TABLET, FILM COATED ORAL at 05:59

## 2023-01-01 RX ADMIN — TAMSULOSIN HYDROCHLORIDE 0.8 MG: 0.4 CAPSULE ORAL at 21:06

## 2023-01-01 RX ADMIN — DOBUTAMINE HYDROCHLORIDE 5 MCG/KG/MIN: 200 INJECTION INTRAVENOUS at 11:51

## 2023-01-01 RX ADMIN — FERROUS SULFATE TAB 325 MG (65 MG ELEMENTAL FE) 325 MG: 325 (65 FE) TAB at 08:29

## 2023-01-01 RX ADMIN — AMLODIPINE BESYLATE 10 MG: 10 TABLET ORAL at 07:40

## 2023-01-01 RX ADMIN — EMPAGLIFLOZIN 10 MG: 10 TABLET, FILM COATED ORAL at 08:10

## 2023-01-01 RX ADMIN — DICLOFENAC SODIUM 4 G: 10 GEL TOPICAL at 19:37

## 2023-01-01 RX ADMIN — POTASSIUM CHLORIDE 100 MEQ: 1500 TABLET, EXTENDED RELEASE ORAL at 15:11

## 2023-01-01 RX ADMIN — POTASSIUM CHLORIDE 80 MEQ: 1500 TABLET, EXTENDED RELEASE ORAL at 16:44

## 2023-01-01 RX ADMIN — TAMSULOSIN HYDROCHLORIDE 0.8 MG: 0.4 CAPSULE ORAL at 19:42

## 2023-01-01 RX ADMIN — POTASSIUM CHLORIDE 80 MEQ: 1500 TABLET, EXTENDED RELEASE ORAL at 08:13

## 2023-01-01 RX ADMIN — POTASSIUM CHLORIDE 40 MEQ: 750 TABLET, EXTENDED RELEASE ORAL at 10:54

## 2023-01-01 RX ADMIN — PANTOPRAZOLE SODIUM 40 MG: 40 TABLET, DELAYED RELEASE ORAL at 08:11

## 2023-01-01 RX ADMIN — METOLAZONE 5 MG: 2.5 TABLET ORAL at 15:20

## 2023-01-01 RX ADMIN — ALLOPURINOL 200 MG: 100 TABLET ORAL at 08:15

## 2023-01-01 RX ADMIN — HYDROXYZINE HYDROCHLORIDE 25 MG: 25 TABLET, FILM COATED ORAL at 15:30

## 2023-01-01 RX ADMIN — POTASSIUM CHLORIDE 20 MEQ: 29.8 INJECTION, SOLUTION INTRAVENOUS at 09:52

## 2023-01-01 RX ADMIN — Medication 12.5 MG: at 22:19

## 2023-01-01 RX ADMIN — HYDROXYZINE HYDROCHLORIDE 25 MG: 25 TABLET, FILM COATED ORAL at 12:25

## 2023-01-01 RX ADMIN — LEVOTHYROXINE SODIUM 88 MCG: 88 TABLET ORAL at 08:25

## 2023-01-01 RX ADMIN — HYDRALAZINE HYDROCHLORIDE 25 MG: 25 TABLET, FILM COATED ORAL at 21:05

## 2023-01-01 RX ADMIN — BUMETANIDE 2 MG/HR: 0.25 INJECTION, SOLUTION INTRAMUSCULAR; INTRAVENOUS at 19:12

## 2023-01-01 RX ADMIN — FERROUS SULFATE TAB 325 MG (65 MG ELEMENTAL FE) 325 MG: 325 (65 FE) TAB at 08:20

## 2023-01-01 RX ADMIN — TORSEMIDE 100 MG: 100 TABLET ORAL at 10:52

## 2023-01-01 RX ADMIN — POLYETHYLENE GLYCOL 3350, SODIUM SULFATE ANHYDROUS, SODIUM BICARBONATE, SODIUM CHLORIDE, POTASSIUM CHLORIDE 2000 ML: 236; 22.74; 6.74; 5.86; 2.97 POWDER, FOR SOLUTION ORAL at 05:31

## 2023-01-01 RX ADMIN — SPIRONOLACTONE 12.5 MG: 25 TABLET, FILM COATED ORAL at 15:28

## 2023-01-01 RX ADMIN — LEVOTHYROXINE SODIUM 100 MCG: 100 TABLET ORAL at 09:40

## 2023-01-01 RX ADMIN — CIPROFLOXACIN 750 MG: 250 TABLET, FILM COATED ORAL at 07:37

## 2023-01-01 RX ADMIN — MAGNESIUM OXIDE TAB 400 MG (241.3 MG ELEMENTAL MG) 400 MG: 400 (241.3 MG) TAB at 21:54

## 2023-01-01 RX ADMIN — CIPROFLOXACIN HYDROCHLORIDE 500 MG: 500 TABLET, FILM COATED ORAL at 20:40

## 2023-01-01 RX ADMIN — PANTOPRAZOLE SODIUM 40 MG: 40 TABLET, DELAYED RELEASE ORAL at 08:30

## 2023-01-01 RX ADMIN — HYDRALAZINE HYDROCHLORIDE 50 MG: 50 TABLET, FILM COATED ORAL at 08:14

## 2023-01-01 RX ADMIN — HYDRALAZINE HYDROCHLORIDE 100 MG: 100 TABLET, FILM COATED ORAL at 13:52

## 2023-01-01 RX ADMIN — INSULIN ASPART 1 UNITS: 100 INJECTION, SOLUTION INTRAVENOUS; SUBCUTANEOUS at 10:41

## 2023-01-01 RX ADMIN — TAMSULOSIN HYDROCHLORIDE 0.8 MG: 0.4 CAPSULE ORAL at 07:51

## 2023-01-01 RX ADMIN — WARFARIN SODIUM 1.5 MG: 3 TABLET ORAL at 18:51

## 2023-01-01 RX ADMIN — PAROXETINE 20 MG: 20 TABLET, FILM COATED ORAL at 08:14

## 2023-01-01 RX ADMIN — VANCOMYCIN HYDROCHLORIDE 125 MG: KIT at 14:00

## 2023-01-01 RX ADMIN — CEFEPIME HYDROCHLORIDE 1 G: 1 INJECTION, POWDER, FOR SOLUTION INTRAMUSCULAR; INTRAVENOUS at 08:01

## 2023-01-01 RX ADMIN — WARFARIN SODIUM 3 MG: 3 TABLET ORAL at 18:17

## 2023-01-01 RX ADMIN — MAGNESIUM OXIDE TAB 400 MG (240 MG ELEMENTAL MG) 400 MG: 400 (240 MG) TAB at 14:32

## 2023-01-01 RX ADMIN — POTASSIUM CHLORIDE 100 MEQ: 1500 TABLET, EXTENDED RELEASE ORAL at 13:11

## 2023-01-01 RX ADMIN — EMPAGLIFLOZIN 10 MG: 10 TABLET, FILM COATED ORAL at 07:34

## 2023-01-01 RX ADMIN — Medication 2 MG/HR: at 00:06

## 2023-01-01 RX ADMIN — ALLOPURINOL 200 MG: 100 TABLET ORAL at 08:24

## 2023-01-01 RX ADMIN — IRON SUCROSE 300 MG: 20 INJECTION, SOLUTION INTRAVENOUS at 13:14

## 2023-01-01 RX ADMIN — WARFARIN SODIUM 2 MG: 1 TABLET ORAL at 17:40

## 2023-01-01 RX ADMIN — POTASSIUM CHLORIDE 60 MEQ: 1500 TABLET, EXTENDED RELEASE ORAL at 23:33

## 2023-01-01 RX ADMIN — POTASSIUM CHLORIDE 100 MEQ: 1500 TABLET, EXTENDED RELEASE ORAL at 14:28

## 2023-01-01 RX ADMIN — METHOCARBAMOL 500 MG: 500 TABLET ORAL at 08:14

## 2023-01-01 RX ADMIN — MAGNESIUM OXIDE TAB 400 MG (241.3 MG ELEMENTAL MG) 400 MG: 400 (241.3 MG) TAB at 20:31

## 2023-01-01 RX ADMIN — POTASSIUM CHLORIDE 80 MEQ: 1500 TABLET, EXTENDED RELEASE ORAL at 15:56

## 2023-01-01 RX ADMIN — LEVOTHYROXINE SODIUM 88 MCG: 88 TABLET ORAL at 06:34

## 2023-01-01 RX ADMIN — WARFARIN SODIUM 3 MG: 3 TABLET ORAL at 18:02

## 2023-01-01 RX ADMIN — FERROUS SULFATE TAB 325 MG (65 MG ELEMENTAL FE) 325 MG: 325 (65 FE) TAB at 09:20

## 2023-01-01 RX ADMIN — FINASTERIDE 5 MG: 5 TABLET, FILM COATED ORAL at 09:12

## 2023-01-01 RX ADMIN — CEFEPIME HYDROCHLORIDE 1 G: 1 INJECTION, POWDER, FOR SOLUTION INTRAMUSCULAR; INTRAVENOUS at 04:09

## 2023-01-01 RX ADMIN — Medication 400 MG: at 09:00

## 2023-01-01 RX ADMIN — DAPTOMYCIN 500 MG: 500 INJECTION, POWDER, LYOPHILIZED, FOR SOLUTION INTRAVENOUS at 16:18

## 2023-01-01 RX ADMIN — POTASSIUM CHLORIDE 100 MEQ: 1500 TABLET, EXTENDED RELEASE ORAL at 20:02

## 2023-01-01 RX ADMIN — CEFEPIME HYDROCHLORIDE 2 G: 2 INJECTION, POWDER, FOR SOLUTION INTRAVENOUS at 08:16

## 2023-01-01 RX ADMIN — FINASTERIDE 5 MG: 5 TABLET, FILM COATED ORAL at 09:01

## 2023-01-01 RX ADMIN — HYDRALAZINE HYDROCHLORIDE 25 MG: 25 TABLET, FILM COATED ORAL at 06:10

## 2023-01-01 RX ADMIN — BUMETANIDE 5 MG: 0.25 INJECTION INTRAMUSCULAR; INTRAVENOUS at 08:24

## 2023-01-01 RX ADMIN — POTASSIUM CHLORIDE 80 MEQ: 1500 TABLET, EXTENDED RELEASE ORAL at 07:36

## 2023-01-01 RX ADMIN — HYDRALAZINE HYDROCHLORIDE 25 MG: 25 TABLET, FILM COATED ORAL at 13:59

## 2023-01-01 RX ADMIN — Medication 1 TABLET: at 07:47

## 2023-01-01 RX ADMIN — HYDRALAZINE HYDROCHLORIDE 25 MG: 25 TABLET, FILM COATED ORAL at 13:47

## 2023-01-01 RX ADMIN — INSULIN ASPART 1 UNITS: 100 INJECTION, SOLUTION INTRAVENOUS; SUBCUTANEOUS at 18:08

## 2023-01-01 RX ADMIN — POTASSIUM CHLORIDE 80 MEQ: 1500 TABLET, EXTENDED RELEASE ORAL at 07:43

## 2023-01-01 RX ADMIN — MAGNESIUM OXIDE TAB 400 MG (240 MG ELEMENTAL MG) 400 MG: 400 (240 MG) TAB at 07:01

## 2023-01-01 RX ADMIN — POTASSIUM CHLORIDE 100 MEQ: 1500 TABLET, EXTENDED RELEASE ORAL at 19:42

## 2023-01-01 RX ADMIN — BUMETANIDE 5 MG: 0.25 INJECTION INTRAMUSCULAR; INTRAVENOUS at 20:59

## 2023-01-01 RX ADMIN — AMIODARONE HYDROCHLORIDE 200 MG: 200 TABLET ORAL at 11:44

## 2023-01-01 RX ADMIN — ISOSORBIDE MONONITRATE 120 MG: 60 TABLET, EXTENDED RELEASE ORAL at 09:20

## 2023-01-01 RX ADMIN — DICLOFENAC SODIUM 4 G: 10 GEL TOPICAL at 16:55

## 2023-01-01 RX ADMIN — POTASSIUM CHLORIDE 20 MEQ: 1.5 POWDER, FOR SOLUTION ORAL at 09:40

## 2023-01-01 RX ADMIN — PANTOPRAZOLE SODIUM 40 MG: 40 TABLET, DELAYED RELEASE ORAL at 11:51

## 2023-01-01 RX ADMIN — POTASSIUM CHLORIDE 80 MEQ: 1500 TABLET, EXTENDED RELEASE ORAL at 11:45

## 2023-01-01 RX ADMIN — FINASTERIDE 5 MG: 5 TABLET, FILM COATED ORAL at 08:25

## 2023-01-01 RX ADMIN — ALLOPURINOL 200 MG: 100 TABLET ORAL at 08:47

## 2023-01-01 RX ADMIN — HYDROMORPHONE HYDROCHLORIDE 1 MG: 2 TABLET ORAL at 16:19

## 2023-01-01 RX ADMIN — MAGNESIUM OXIDE TAB 400 MG (240 MG ELEMENTAL MG) 400 MG: 400 (240 MG) TAB at 11:57

## 2023-01-01 RX ADMIN — CIPROFLOXACIN HYDROCHLORIDE 500 MG: 500 TABLET, FILM COATED ORAL at 20:07

## 2023-01-01 RX ADMIN — DAPTOMYCIN 500 MG: 500 INJECTION, POWDER, LYOPHILIZED, FOR SOLUTION INTRAVENOUS at 15:51

## 2023-01-01 RX ADMIN — SPIRONOLACTONE 25 MG: 25 TABLET, FILM COATED ORAL at 07:34

## 2023-01-01 RX ADMIN — POTASSIUM CHLORIDE 80 MEQ: 1500 TABLET, EXTENDED RELEASE ORAL at 19:55

## 2023-01-01 RX ADMIN — DOBUTAMINE HYDROCHLORIDE 5 MCG/KG/MIN: 200 INJECTION INTRAVENOUS at 01:37

## 2023-01-01 RX ADMIN — WARFARIN SODIUM 4 MG: 4 TABLET ORAL at 17:06

## 2023-01-01 RX ADMIN — POTASSIUM CHLORIDE 20 MEQ: 750 TABLET, EXTENDED RELEASE ORAL at 08:11

## 2023-01-01 RX ADMIN — INSULIN ASPART 1 UNITS: 100 INJECTION, SOLUTION INTRAVENOUS; SUBCUTANEOUS at 17:31

## 2023-01-01 RX ADMIN — AMLODIPINE BESYLATE 10 MG: 10 TABLET ORAL at 08:26

## 2023-01-01 RX ADMIN — POTASSIUM CHLORIDE 100 MEQ: 1500 TABLET, EXTENDED RELEASE ORAL at 09:06

## 2023-01-01 RX ADMIN — Medication 2 MG/HR: at 20:30

## 2023-01-01 RX ADMIN — HYDRALAZINE HYDROCHLORIDE 50 MG: 50 TABLET, FILM COATED ORAL at 14:18

## 2023-01-01 RX ADMIN — IRON SUCROSE 200 MG: 20 INJECTION, SOLUTION INTRAVENOUS at 17:57

## 2023-01-01 RX ADMIN — MAGNESIUM OXIDE TAB 400 MG (241.3 MG ELEMENTAL MG) 400 MG: 400 (241.3 MG) TAB at 11:51

## 2023-01-01 RX ADMIN — ZOLPIDEM TARTRATE 5 MG: 5 TABLET ORAL at 22:04

## 2023-01-01 RX ADMIN — ACETAMINOPHEN 650 MG: 325 TABLET, FILM COATED ORAL at 00:21

## 2023-01-01 RX ADMIN — HYDRALAZINE HYDROCHLORIDE 25 MG: 25 TABLET, FILM COATED ORAL at 06:49

## 2023-01-01 RX ADMIN — POTASSIUM CHLORIDE 20 MEQ: 29.8 INJECTION, SOLUTION INTRAVENOUS at 10:08

## 2023-01-01 RX ADMIN — LORAZEPAM 0.5 MG: 0.5 TABLET ORAL at 10:38

## 2023-01-01 RX ADMIN — ALLOPURINOL 200 MG: 100 TABLET ORAL at 07:47

## 2023-01-01 RX ADMIN — HYDRALAZINE HYDROCHLORIDE 50 MG: 50 TABLET, FILM COATED ORAL at 20:11

## 2023-01-01 RX ADMIN — PANTOPRAZOLE SODIUM 40 MG: 40 TABLET, DELAYED RELEASE ORAL at 08:49

## 2023-01-01 RX ADMIN — MAGNESIUM OXIDE TAB 400 MG (241.3 MG ELEMENTAL MG) 400 MG: 400 (241.3 MG) TAB at 09:27

## 2023-01-01 RX ADMIN — AMIODARONE HYDROCHLORIDE 200 MG: 200 TABLET ORAL at 07:39

## 2023-01-01 RX ADMIN — METHOCARBAMOL 500 MG: 500 TABLET ORAL at 13:48

## 2023-01-01 RX ADMIN — MAGNESIUM OXIDE TAB 400 MG (241.3 MG ELEMENTAL MG) 400 MG: 400 (241.3 MG) TAB at 08:49

## 2023-01-01 RX ADMIN — CIPROFLOXACIN HYDROCHLORIDE 500 MG: 500 TABLET, FILM COATED ORAL at 20:26

## 2023-01-01 RX ADMIN — TORSEMIDE 100 MG: 100 TABLET ORAL at 20:05

## 2023-01-01 RX ADMIN — MAGNESIUM OXIDE TAB 400 MG (241.3 MG ELEMENTAL MG) 400 MG: 400 (241.3 MG) TAB at 12:01

## 2023-01-01 RX ADMIN — BUMETANIDE 2 MG/HR: 0.25 INJECTION, SOLUTION INTRAMUSCULAR; INTRAVENOUS at 22:12

## 2023-01-01 RX ADMIN — AMIODARONE HYDROCHLORIDE 200 MG: 200 TABLET ORAL at 07:41

## 2023-01-01 RX ADMIN — BUMETANIDE 8 MG: 0.25 INJECTION INTRAMUSCULAR; INTRAVENOUS at 17:41

## 2023-01-01 RX ADMIN — LEVOTHYROXINE SODIUM 100 MCG: 100 TABLET ORAL at 09:20

## 2023-01-01 RX ADMIN — ZOLPIDEM TARTRATE 2.5 MG: 5 TABLET, FILM COATED ORAL at 21:36

## 2023-01-01 RX ADMIN — ZOLPIDEM TARTRATE 5 MG: 5 TABLET ORAL at 22:13

## 2023-01-01 RX ADMIN — LEVOTHYROXINE SODIUM 88 MCG: 88 TABLET ORAL at 07:37

## 2023-01-01 RX ADMIN — TAMSULOSIN HYDROCHLORIDE 0.8 MG: 0.4 CAPSULE ORAL at 20:12

## 2023-01-01 RX ADMIN — PANTOPRAZOLE SODIUM 40 MG: 40 INJECTION, POWDER, FOR SOLUTION INTRAVENOUS at 07:54

## 2023-01-01 RX ADMIN — POTASSIUM CHLORIDE 60 MEQ: 1500 TABLET, EXTENDED RELEASE ORAL at 10:50

## 2023-01-01 RX ADMIN — HYDRALAZINE HYDROCHLORIDE 50 MG: 50 TABLET, FILM COATED ORAL at 20:17

## 2023-01-01 RX ADMIN — WARFARIN SODIUM 3 MG: 3 TABLET ORAL at 17:41

## 2023-01-01 RX ADMIN — Medication 400 MG: at 13:20

## 2023-01-01 RX ADMIN — HYDRALAZINE HYDROCHLORIDE 100 MG: 100 TABLET, FILM COATED ORAL at 07:24

## 2023-01-01 RX ADMIN — DAPTOMYCIN 500 MG: 500 INJECTION, POWDER, LYOPHILIZED, FOR SOLUTION INTRAVENOUS at 16:25

## 2023-01-01 RX ADMIN — HYDRALAZINE HYDROCHLORIDE 100 MG: 100 TABLET, FILM COATED ORAL at 20:17

## 2023-01-01 RX ADMIN — TAMSULOSIN HYDROCHLORIDE 0.8 MG: 0.4 CAPSULE ORAL at 20:00

## 2023-01-01 RX ADMIN — TAMSULOSIN HYDROCHLORIDE 0.8 MG: 0.4 CAPSULE ORAL at 20:06

## 2023-01-01 RX ADMIN — MAGNESIUM SULFATE HEPTAHYDRATE 2 G: 40 INJECTION, SOLUTION INTRAVENOUS at 19:46

## 2023-01-01 RX ADMIN — MAGNESIUM OXIDE TAB 400 MG (240 MG ELEMENTAL MG) 400 MG: 400 (240 MG) TAB at 10:33

## 2023-01-01 RX ADMIN — DAPAGLIFLOZIN 5 MG: 5 TABLET, FILM COATED ORAL at 07:24

## 2023-01-01 RX ADMIN — LEVOTHYROXINE SODIUM 100 MCG: 100 TABLET ORAL at 08:46

## 2023-01-01 RX ADMIN — MEROPENEM 500 MG: 500 INJECTION, POWDER, FOR SOLUTION INTRAVENOUS at 09:01

## 2023-01-01 RX ADMIN — IODIXANOL 5 ML: 320 INJECTION, SOLUTION INTRAVASCULAR at 10:20

## 2023-01-01 RX ADMIN — POTASSIUM CHLORIDE 40 MEQ: 1500 TABLET, EXTENDED RELEASE ORAL at 19:48

## 2023-01-01 RX ADMIN — POTASSIUM CHLORIDE 80 MEQ: 1500 TABLET, EXTENDED RELEASE ORAL at 20:26

## 2023-01-01 RX ADMIN — AMLODIPINE BESYLATE 10 MG: 10 TABLET ORAL at 08:13

## 2023-01-01 RX ADMIN — ASPIRIN 81 MG: 81 TABLET ORAL at 08:07

## 2023-01-01 RX ADMIN — INSULIN ASPART 1 UNITS: 100 INJECTION, SOLUTION INTRAVENOUS; SUBCUTANEOUS at 09:43

## 2023-01-01 RX ADMIN — PANTOPRAZOLE SODIUM 40 MG: 40 TABLET, DELAYED RELEASE ORAL at 08:40

## 2023-01-01 RX ADMIN — AMLODIPINE BESYLATE 10 MG: 10 TABLET ORAL at 09:21

## 2023-01-01 RX ADMIN — POTASSIUM CHLORIDE 20 MEQ: 29.8 INJECTION, SOLUTION INTRAVENOUS at 08:19

## 2023-01-01 RX ADMIN — ISOSORBIDE MONONITRATE 120 MG: 60 TABLET, EXTENDED RELEASE ORAL at 10:39

## 2023-01-01 RX ADMIN — METHOCARBAMOL 500 MG: 500 TABLET ORAL at 07:48

## 2023-01-01 RX ADMIN — CIPROFLOXACIN HYDROCHLORIDE 500 MG: 500 TABLET, FILM COATED ORAL at 07:47

## 2023-01-01 RX ADMIN — Medication 2 MG/HR: at 20:44

## 2023-01-01 RX ADMIN — B-COMPLEX W/ C & FOLIC ACID TAB 1 MG 1 TABLET: 1 TAB at 09:21

## 2023-01-01 RX ADMIN — TAMSULOSIN HYDROCHLORIDE 0.8 MG: 0.4 CAPSULE ORAL at 19:05

## 2023-01-01 RX ADMIN — ISOSORBIDE MONONITRATE 120 MG: 60 TABLET, EXTENDED RELEASE ORAL at 08:17

## 2023-01-01 RX ADMIN — WARFARIN SODIUM 5 MG: 5 TABLET ORAL at 17:51

## 2023-01-01 RX ADMIN — ACETAMINOPHEN 325 MG: 325 TABLET ORAL at 05:49

## 2023-01-01 RX ADMIN — ACETAMINOPHEN 650 MG: 325 TABLET, FILM COATED ORAL at 05:20

## 2023-01-01 RX ADMIN — POTASSIUM CHLORIDE 20 MEQ: 750 TABLET, EXTENDED RELEASE ORAL at 08:02

## 2023-01-01 RX ADMIN — Medication 400 MG: at 18:22

## 2023-01-01 RX ADMIN — AMLODIPINE BESYLATE 10 MG: 10 TABLET ORAL at 10:38

## 2023-01-01 RX ADMIN — MAGNESIUM OXIDE TAB 400 MG (241.3 MG ELEMENTAL MG) 400 MG: 400 (241.3 MG) TAB at 12:04

## 2023-01-01 RX ADMIN — ZOLPIDEM TARTRATE 2.5 MG: 5 TABLET, FILM COATED ORAL at 21:54

## 2023-01-01 RX ADMIN — POTASSIUM CHLORIDE 10 MEQ: 7.46 INJECTION, SOLUTION INTRAVENOUS at 15:40

## 2023-01-01 RX ADMIN — METHOCARBAMOL 500 MG: 500 TABLET ORAL at 14:55

## 2023-01-01 RX ADMIN — Medication 1 TABLET: at 07:33

## 2023-01-01 RX ADMIN — POTASSIUM CHLORIDE 60 MEQ: 1500 TABLET, EXTENDED RELEASE ORAL at 03:34

## 2023-01-01 RX ADMIN — MAGNESIUM OXIDE TAB 400 MG (241.3 MG ELEMENTAL MG) 400 MG: 400 (241.3 MG) TAB at 20:14

## 2023-01-01 RX ADMIN — DAPTOMYCIN 500 MG: 500 INJECTION, POWDER, LYOPHILIZED, FOR SOLUTION INTRAVENOUS at 16:50

## 2023-01-01 RX ADMIN — ISOSORBIDE MONONITRATE 120 MG: 60 TABLET, EXTENDED RELEASE ORAL at 08:25

## 2023-01-01 RX ADMIN — HYDRALAZINE HYDROCHLORIDE 25 MG: 25 TABLET, FILM COATED ORAL at 21:32

## 2023-01-01 RX ADMIN — CIPROFLOXACIN 750 MG: 250 TABLET, FILM COATED ORAL at 08:30

## 2023-01-01 RX ADMIN — CIPROFLOXACIN HYDROCHLORIDE 500 MG: 500 TABLET, FILM COATED ORAL at 08:03

## 2023-01-01 RX ADMIN — AMIODARONE HYDROCHLORIDE 200 MG: 200 TABLET ORAL at 09:00

## 2023-01-01 RX ADMIN — POTASSIUM CHLORIDE 20 MEQ: 750 TABLET, EXTENDED RELEASE ORAL at 15:35

## 2023-01-01 RX ADMIN — Medication 1 CAPSULE: at 20:00

## 2023-01-01 RX ADMIN — DICLOFENAC SODIUM 4 G: 10 GEL TOPICAL at 08:31

## 2023-01-01 RX ADMIN — MAGNESIUM OXIDE TAB 400 MG (241.3 MG ELEMENTAL MG) 400 MG: 400 (241.3 MG) TAB at 08:28

## 2023-01-01 RX ADMIN — WARFARIN SODIUM 0.5 MG: 1 TABLET ORAL at 17:52

## 2023-01-01 RX ADMIN — DAPAGLIFLOZIN 5 MG: 5 TABLET, FILM COATED ORAL at 08:13

## 2023-01-01 RX ADMIN — POTASSIUM CHLORIDE 80 MEQ: 1500 TABLET, EXTENDED RELEASE ORAL at 21:24

## 2023-01-01 RX ADMIN — DAPTOMYCIN 500 MG: 500 INJECTION, POWDER, LYOPHILIZED, FOR SOLUTION INTRAVENOUS at 15:56

## 2023-01-01 RX ADMIN — ALLOPURINOL 200 MG: 100 TABLET ORAL at 08:22

## 2023-01-01 RX ADMIN — FINASTERIDE 5 MG: 5 TABLET, FILM COATED ORAL at 09:05

## 2023-01-01 RX ADMIN — POTASSIUM CHLORIDE 80 MEQ: 1500 TABLET, EXTENDED RELEASE ORAL at 08:59

## 2023-01-01 RX ADMIN — MAGNESIUM OXIDE TAB 400 MG (241.3 MG ELEMENTAL MG) 400 MG: 400 (241.3 MG) TAB at 12:11

## 2023-01-01 RX ADMIN — WARFARIN SODIUM 1.5 MG: 3 TABLET ORAL at 18:24

## 2023-01-01 RX ADMIN — LEVOTHYROXINE SODIUM 100 MCG: 100 TABLET ORAL at 08:16

## 2023-01-01 RX ADMIN — BUMETANIDE 8 MG: 0.25 INJECTION INTRAMUSCULAR; INTRAVENOUS at 06:18

## 2023-01-01 RX ADMIN — FINASTERIDE 5 MG: 5 TABLET, FILM COATED ORAL at 09:06

## 2023-01-01 RX ADMIN — POTASSIUM CHLORIDE 80 MEQ: 1500 TABLET, EXTENDED RELEASE ORAL at 08:32

## 2023-01-01 RX ADMIN — POTASSIUM CHLORIDE 100 MEQ: 1500 TABLET, EXTENDED RELEASE ORAL at 19:50

## 2023-01-01 RX ADMIN — PAROXETINE HYDROCHLORIDE 10 MG: 10 TABLET, FILM COATED ORAL at 08:25

## 2023-01-01 RX ADMIN — POTASSIUM CHLORIDE 60 MEQ: 1500 TABLET, EXTENDED RELEASE ORAL at 20:27

## 2023-01-01 RX ADMIN — BUMETANIDE 2 MG/HR: 0.25 INJECTION INTRAMUSCULAR; INTRAVENOUS at 02:32

## 2023-01-01 RX ADMIN — POTASSIUM CHLORIDE 20 MEQ: 750 TABLET, EXTENDED RELEASE ORAL at 09:11

## 2023-01-01 RX ADMIN — VANCOMYCIN HYDROCHLORIDE 125 MG: KIT at 20:37

## 2023-01-01 RX ADMIN — METHOCARBAMOL 500 MG: 500 TABLET ORAL at 21:54

## 2023-01-01 RX ADMIN — MAGNESIUM OXIDE TAB 400 MG (241.3 MG ELEMENTAL MG) 400 MG: 400 (241.3 MG) TAB at 13:23

## 2023-01-01 RX ADMIN — ACETAZOLAMIDE SODIUM 500 MG: 500 INJECTION, POWDER, LYOPHILIZED, FOR SOLUTION INTRAVENOUS at 13:00

## 2023-01-01 RX ADMIN — ALLOPURINOL 200 MG: 100 TABLET ORAL at 08:48

## 2023-01-01 RX ADMIN — CHLOROTHIAZIDE SODIUM 1000 MG: 500 INJECTION, POWDER, LYOPHILIZED, FOR SOLUTION INTRAVENOUS at 16:15

## 2023-01-01 RX ADMIN — MAGNESIUM OXIDE TAB 400 MG (241.3 MG ELEMENTAL MG) 400 MG: 400 (241.3 MG) TAB at 23:33

## 2023-01-01 RX ADMIN — FINASTERIDE 5 MG: 5 TABLET, FILM COATED ORAL at 08:57

## 2023-01-01 RX ADMIN — POTASSIUM CHLORIDE 100 MEQ: 1500 TABLET, EXTENDED RELEASE ORAL at 08:27

## 2023-01-01 RX ADMIN — PANTOPRAZOLE SODIUM 40 MG: 40 TABLET, DELAYED RELEASE ORAL at 07:37

## 2023-01-01 RX ADMIN — ISOSORBIDE MONONITRATE 120 MG: 60 TABLET, EXTENDED RELEASE ORAL at 07:37

## 2023-01-01 RX ADMIN — WHITE PETROLATUM: 1.75 OINTMENT TOPICAL at 08:27

## 2023-01-01 RX ADMIN — PANTOPRAZOLE SODIUM 40 MG: 40 TABLET, DELAYED RELEASE ORAL at 16:43

## 2023-01-01 RX ADMIN — WARFARIN SODIUM 4 MG: 4 TABLET ORAL at 18:45

## 2023-01-01 RX ADMIN — POTASSIUM CHLORIDE 10 MEQ: 7.46 INJECTION, SOLUTION INTRAVENOUS at 17:41

## 2023-01-01 RX ADMIN — HYDRALAZINE HYDROCHLORIDE 50 MG: 50 TABLET, FILM COATED ORAL at 10:40

## 2023-01-01 RX ADMIN — POTASSIUM CHLORIDE 20 MEQ: 750 TABLET, EXTENDED RELEASE ORAL at 21:50

## 2023-01-01 RX ADMIN — EMPAGLIFLOZIN 10 MG: 10 TABLET, FILM COATED ORAL at 08:22

## 2023-01-01 RX ADMIN — DICLOFENAC SODIUM 2 G: 10 GEL TOPICAL at 11:53

## 2023-01-01 RX ADMIN — EMPAGLIFLOZIN 10 MG: 10 TABLET, FILM COATED ORAL at 08:12

## 2023-01-01 RX ADMIN — BUMETANIDE 2 MG/HR: 0.25 INJECTION INTRAMUSCULAR; INTRAVENOUS at 23:38

## 2023-01-01 RX ADMIN — POTASSIUM CHLORIDE 80 MEQ: 1500 TABLET, EXTENDED RELEASE ORAL at 08:19

## 2023-01-01 RX ADMIN — POTASSIUM CHLORIDE 100 MEQ: 1500 TABLET, EXTENDED RELEASE ORAL at 15:00

## 2023-01-01 RX ADMIN — POTASSIUM CHLORIDE 100 MEQ: 1500 TABLET, EXTENDED RELEASE ORAL at 09:45

## 2023-01-01 RX ADMIN — CIPROFLOXACIN HYDROCHLORIDE 500 MG: 500 TABLET, FILM COATED ORAL at 19:31

## 2023-01-01 RX ADMIN — METOLAZONE 2.5 MG: 2.5 TABLET ORAL at 15:46

## 2023-01-01 RX ADMIN — SPIRONOLACTONE 25 MG: 25 TABLET, FILM COATED ORAL at 07:40

## 2023-01-01 RX ADMIN — ZOLPIDEM TARTRATE 5 MG: 5 TABLET ORAL at 22:05

## 2023-01-01 RX ADMIN — BUMETANIDE 5 MG: 0.25 INJECTION INTRAMUSCULAR; INTRAVENOUS at 10:02

## 2023-01-01 RX ADMIN — CIPROFLOXACIN 750 MG: 250 TABLET, FILM COATED ORAL at 20:31

## 2023-01-01 RX ADMIN — FERROUS SULFATE TAB 325 MG (65 MG ELEMENTAL FE) 325 MG: 325 (65 FE) TAB at 07:47

## 2023-01-01 RX ADMIN — ISOSORBIDE MONONITRATE 120 MG: 60 TABLET, EXTENDED RELEASE ORAL at 08:04

## 2023-01-01 RX ADMIN — HYDRALAZINE HYDROCHLORIDE 25 MG: 25 TABLET, FILM COATED ORAL at 17:20

## 2023-01-01 RX ADMIN — INSULIN ASPART 2 UNITS: 100 INJECTION, SOLUTION INTRAVENOUS; SUBCUTANEOUS at 18:11

## 2023-01-01 RX ADMIN — ISOSORBIDE MONONITRATE 30 MG: 30 TABLET, EXTENDED RELEASE ORAL at 11:54

## 2023-01-01 RX ADMIN — ZOLPIDEM TARTRATE 5 MG: 5 TABLET ORAL at 21:21

## 2023-01-01 RX ADMIN — PAROXETINE HYDROCHLORIDE 20 MG: 10 TABLET, FILM COATED ORAL at 07:40

## 2023-01-01 RX ADMIN — ACETAMINOPHEN 650 MG: 325 TABLET ORAL at 04:55

## 2023-01-01 RX ADMIN — AMIODARONE HYDROCHLORIDE 200 MG: 200 TABLET ORAL at 09:01

## 2023-01-01 RX ADMIN — POTASSIUM CHLORIDE 80 MEQ: 1500 TABLET, EXTENDED RELEASE ORAL at 21:06

## 2023-01-01 RX ADMIN — WARFARIN SODIUM 2 MG: 1 TABLET ORAL at 18:16

## 2023-01-01 RX ADMIN — MAGNESIUM SULFATE IN WATER 2 G: 40 INJECTION, SOLUTION INTRAVENOUS at 08:01

## 2023-01-01 RX ADMIN — POTASSIUM CHLORIDE 10 MEQ: 7.46 INJECTION, SOLUTION INTRAVENOUS at 18:24

## 2023-01-01 RX ADMIN — Medication 12.5 MG: at 22:07

## 2023-01-01 RX ADMIN — INSULIN ASPART 2 UNITS: 100 INJECTION, SOLUTION INTRAVENOUS; SUBCUTANEOUS at 12:06

## 2023-01-01 RX ADMIN — POTASSIUM CHLORIDE 100 MEQ: 1500 TABLET, EXTENDED RELEASE ORAL at 11:28

## 2023-01-01 RX ADMIN — WARFARIN SODIUM 1 MG: 1 TABLET ORAL at 17:32

## 2023-01-01 RX ADMIN — FLUOXETINE 30 MG: 20 CAPSULE ORAL at 08:16

## 2023-01-01 RX ADMIN — FERROUS SULFATE TAB 325 MG (65 MG ELEMENTAL FE) 325 MG: 325 (65 FE) TAB at 08:23

## 2023-01-01 RX ADMIN — AMIODARONE HYDROCHLORIDE 200 MG: 200 TABLET ORAL at 08:46

## 2023-01-01 RX ADMIN — MAGNESIUM OXIDE TAB 400 MG (241.3 MG ELEMENTAL MG) 400 MG: 400 (241.3 MG) TAB at 20:02

## 2023-01-01 RX ADMIN — B-COMPLEX W/ C & FOLIC ACID TAB 1 MG 1 TABLET: 1 TAB at 08:25

## 2023-01-01 RX ADMIN — CIPROFLOXACIN HYDROCHLORIDE 500 MG: 500 TABLET, FILM COATED ORAL at 07:46

## 2023-01-01 RX ADMIN — HYDRALAZINE HYDROCHLORIDE 25 MG: 25 TABLET, FILM COATED ORAL at 05:31

## 2023-01-01 RX ADMIN — MAGNESIUM SULFATE HEPTAHYDRATE 2 G: 40 INJECTION, SOLUTION INTRAVENOUS at 18:02

## 2023-01-01 RX ADMIN — METHOCARBAMOL 500 MG: 500 TABLET ORAL at 18:36

## 2023-01-01 RX ADMIN — DAPAGLIFLOZIN 10 MG: 10 TABLET, FILM COATED ORAL at 09:20

## 2023-01-01 RX ADMIN — AMIODARONE HYDROCHLORIDE 200 MG: 200 TABLET ORAL at 09:41

## 2023-01-01 RX ADMIN — POTASSIUM CHLORIDE 40 MEQ: 1500 TABLET, EXTENDED RELEASE ORAL at 08:07

## 2023-01-01 RX ADMIN — METHOCARBAMOL 500 MG: 500 TABLET ORAL at 20:16

## 2023-01-01 RX ADMIN — PAROXETINE HYDROCHLORIDE 20 MG: 10 TABLET, FILM COATED ORAL at 10:40

## 2023-01-01 RX ADMIN — MAGNESIUM OXIDE TAB 400 MG (241.3 MG ELEMENTAL MG) 400 MG: 400 (241.3 MG) TAB at 18:33

## 2023-01-01 RX ADMIN — CEFEPIME HYDROCHLORIDE 1 G: 1 INJECTION, POWDER, FOR SOLUTION INTRAMUSCULAR; INTRAVENOUS at 20:30

## 2023-01-01 RX ADMIN — MAGNESIUM OXIDE TAB 400 MG (241.3 MG ELEMENTAL MG) 400 MG: 400 (241.3 MG) TAB at 17:08

## 2023-01-01 RX ADMIN — B-COMPLEX W/ C & FOLIC ACID TAB 1 MG 1 TABLET: 1 TAB at 07:39

## 2023-01-01 RX ADMIN — POTASSIUM CHLORIDE 20 MEQ: 29.8 INJECTION, SOLUTION INTRAVENOUS at 18:33

## 2023-01-01 RX ADMIN — CILOSTAZOL 100 MG: 100 TABLET ORAL at 17:02

## 2023-01-01 RX ADMIN — POTASSIUM CHLORIDE 40 MEQ: 750 TABLET, EXTENDED RELEASE ORAL at 15:26

## 2023-01-01 RX ADMIN — LIDOCAINE HYDROCHLORIDE 6 ML: 10 INJECTION, SOLUTION EPIDURAL; INFILTRATION; INTRACAUDAL; PERINEURAL at 10:21

## 2023-01-01 RX ADMIN — WARFARIN SODIUM 4 MG: 4 TABLET ORAL at 21:19

## 2023-01-01 RX ADMIN — METHOCARBAMOL 500 MG: 500 TABLET ORAL at 08:24

## 2023-01-01 RX ADMIN — CHLOROTHIAZIDE SODIUM 500 MG: 500 INJECTION, POWDER, LYOPHILIZED, FOR SOLUTION INTRAVENOUS at 16:49

## 2023-01-01 ASSESSMENT — ACTIVITIES OF DAILY LIVING (ADL)
ADLS_ACUITY_SCORE: 42
ADLS_ACUITY_SCORE: 22
ADLS_ACUITY_SCORE: 22
DRESSING/BATHING_DIFFICULTY: NO
ADLS_ACUITY_SCORE: 32
ADLS_ACUITY_SCORE: 22
ADLS_ACUITY_SCORE: 34
ADLS_ACUITY_SCORE: 22
ADLS_ACUITY_SCORE: 22
ADLS_ACUITY_SCORE: 36
EQUIPMENT_CURRENTLY_USED_AT_HOME: WALKER, ROLLING
ADLS_ACUITY_SCORE: 36
ADLS_ACUITY_SCORE: 36
ADLS_ACUITY_SCORE: 24
ADLS_ACUITY_SCORE: 24
ADLS_ACUITY_SCORE: 40
ADLS_ACUITY_SCORE: 24
ADLS_ACUITY_SCORE: 34
ADLS_ACUITY_SCORE: 32
ADLS_ACUITY_SCORE: 32
BATHING: 1-->ASSISTANCE NEEDED
ADLS_ACUITY_SCORE: 34
ADLS_ACUITY_SCORE: 32
ADLS_ACUITY_SCORE: 24
ADLS_ACUITY_SCORE: 24
ADLS_ACUITY_SCORE: 22
ADLS_ACUITY_SCORE: 35
ADLS_ACUITY_SCORE: 36
ADLS_ACUITY_SCORE: 22
ADLS_ACUITY_SCORE: 22
ADLS_ACUITY_SCORE: 28
ADLS_ACUITY_SCORE: 37
ADLS_ACUITY_SCORE: 22
ADLS_ACUITY_SCORE: 23
ADLS_ACUITY_SCORE: 35
ADLS_ACUITY_SCORE: 22
ADLS_ACUITY_SCORE: 36
ADLS_ACUITY_SCORE: 27
ADLS_ACUITY_SCORE: 34
ADLS_ACUITY_SCORE: 32
ADLS_ACUITY_SCORE: 24
ADLS_ACUITY_SCORE: 32
ADLS_ACUITY_SCORE: 36
TOILETING_ISSUES: NO
ADLS_ACUITY_SCORE: 35
ADLS_ACUITY_SCORE: 28
ADLS_ACUITY_SCORE: 37
ADLS_ACUITY_SCORE: 24
ADLS_ACUITY_SCORE: 32
ADLS_ACUITY_SCORE: 25
ADLS_ACUITY_SCORE: 24
ADLS_ACUITY_SCORE: 22
ADLS_ACUITY_SCORE: 35
ADLS_ACUITY_SCORE: 24
ADLS_ACUITY_SCORE: 27
ADLS_ACUITY_SCORE: 24
ADLS_ACUITY_SCORE: 36
ADLS_ACUITY_SCORE: 22
WALKING_OR_CLIMBING_STAIRS_DIFFICULTY: YES
WEAR_GLASSES_OR_BLIND: YES
ADLS_ACUITY_SCORE: 24
ADLS_ACUITY_SCORE: 22
ADLS_ACUITY_SCORE: 22
ADLS_ACUITY_SCORE: 36
ADLS_ACUITY_SCORE: 36
ADLS_ACUITY_SCORE: 27
ADLS_ACUITY_SCORE: 22
ADLS_ACUITY_SCORE: 35
ADLS_ACUITY_SCORE: 22
ADLS_ACUITY_SCORE: 32
ADLS_ACUITY_SCORE: 32
ADLS_ACUITY_SCORE: 40
ADLS_ACUITY_SCORE: 35
ADLS_ACUITY_SCORE: 24
ADLS_ACUITY_SCORE: 25
ADLS_ACUITY_SCORE: 29
ADLS_ACUITY_SCORE: 22
DIFFICULTY_COMMUNICATING: NO
ADLS_ACUITY_SCORE: 34
ADLS_ACUITY_SCORE: 22
ADLS_ACUITY_SCORE: 22
ADLS_ACUITY_SCORE: 32
ADLS_ACUITY_SCORE: 36
ADLS_ACUITY_SCORE: 22
ADLS_ACUITY_SCORE: 24
ADLS_ACUITY_SCORE: 24
ADLS_ACUITY_SCORE: 22
ADLS_ACUITY_SCORE: 36
ADLS_ACUITY_SCORE: 28
ADLS_ACUITY_SCORE: 24
ADLS_ACUITY_SCORE: 28
ADLS_ACUITY_SCORE: 22
ADLS_ACUITY_SCORE: 36
ADLS_ACUITY_SCORE: 40
ADLS_ACUITY_SCORE: 37
ADLS_ACUITY_SCORE: 35
ADLS_ACUITY_SCORE: 25
ADLS_ACUITY_SCORE: 34
EQUIPMENT_CURRENTLY_USED_AT_HOME: CANE, STRAIGHT;WALKER, ROLLING
ADLS_ACUITY_SCORE: 29
VISION_MANAGEMENT: GLASSES
ADLS_ACUITY_SCORE: 36
ADLS_ACUITY_SCORE: 29
TRANSFERRING: 1-->ASSISTANCE (EQUIPMENT/PERSON) NEEDED
ADLS_ACUITY_SCORE: 28
ADLS_ACUITY_SCORE: 24
ADLS_ACUITY_SCORE: 40
ADLS_ACUITY_SCORE: 22
WALKING_OR_CLIMBING_STAIRS_DIFFICULTY: NO
ADLS_ACUITY_SCORE: 36
ADLS_ACUITY_SCORE: 24
ADLS_ACUITY_SCORE: 36
ADLS_ACUITY_SCORE: 34
ADLS_ACUITY_SCORE: 32
ADLS_ACUITY_SCORE: 36
DOING_ERRANDS_INDEPENDENTLY_DIFFICULTY: NO
ADLS_ACUITY_SCORE: 25
ADLS_ACUITY_SCORE: 22
TRANSFERRING: 1-->ASSISTANCE (EQUIPMENT/PERSON) NEEDED
DIFFICULTY_EATING/SWALLOWING: NO
ADLS_ACUITY_SCORE: 38
ADLS_ACUITY_SCORE: 35
ADLS_ACUITY_SCORE: 46
ADLS_ACUITY_SCORE: 37
WALKING_OR_CLIMBING_STAIRS_DIFFICULTY: YES
ADLS_ACUITY_SCORE: 35
ADLS_ACUITY_SCORE: 36
ADLS_ACUITY_SCORE: 25
ADLS_ACUITY_SCORE: 46
ADLS_ACUITY_SCORE: 44
ADLS_ACUITY_SCORE: 36
ADLS_ACUITY_SCORE: 36
ADLS_ACUITY_SCORE: 29
ADLS_ACUITY_SCORE: 34
ADLS_ACUITY_SCORE: 22
TOILETING_ISSUES: NO
ADLS_ACUITY_SCORE: 42
ADLS_ACUITY_SCORE: 36
ADLS_ACUITY_SCORE: 22
ADLS_ACUITY_SCORE: 38
ADLS_ACUITY_SCORE: 42
ADLS_ACUITY_SCORE: 27
ADLS_ACUITY_SCORE: 34
ADLS_ACUITY_SCORE: 22
ADLS_ACUITY_SCORE: 24
ADLS_ACUITY_SCORE: 35
ADLS_ACUITY_SCORE: 29
ADLS_ACUITY_SCORE: 36
ADLS_ACUITY_SCORE: 37
ADLS_ACUITY_SCORE: 24
ADLS_ACUITY_SCORE: 29
ADLS_ACUITY_SCORE: 22
ADLS_ACUITY_SCORE: 25
ADLS_ACUITY_SCORE: 44
ADLS_ACUITY_SCORE: 22
ADLS_ACUITY_SCORE: 36
ADLS_ACUITY_SCORE: 22
ADLS_ACUITY_SCORE: 44
ADLS_ACUITY_SCORE: 24
DRESSING/BATHING_DIFFICULTY: YES
ADLS_ACUITY_SCORE: 40
ADLS_ACUITY_SCORE: 36
ADLS_ACUITY_SCORE: 46
ADLS_ACUITY_SCORE: 36
TOILETING_ASSISTANCE: TOILETING DIFFICULTY, ASSISTANCE 1 PERSON
ADLS_ACUITY_SCORE: 36
ADLS_ACUITY_SCORE: 22
ADLS_ACUITY_SCORE: 32
ADLS_ACUITY_SCORE: 42
ADLS_ACUITY_SCORE: 32
ADLS_ACUITY_SCORE: 46
ADLS_ACUITY_SCORE: 22
ADLS_ACUITY_SCORE: 30
ADLS_ACUITY_SCORE: 35
ADLS_ACUITY_SCORE: 36
ADLS_ACUITY_SCORE: 36
ADLS_ACUITY_SCORE: 35
ADLS_ACUITY_SCORE: 36
ADLS_ACUITY_SCORE: 22
ADLS_ACUITY_SCORE: 35
ADLS_ACUITY_SCORE: 35
ADLS_ACUITY_SCORE: 22
ADLS_ACUITY_SCORE: 25
ADLS_ACUITY_SCORE: 36
ADLS_ACUITY_SCORE: 24
ADLS_ACUITY_SCORE: 35
ADLS_ACUITY_SCORE: 44
ADLS_ACUITY_SCORE: 32
CONCENTRATING,_REMEMBERING_OR_MAKING_DECISIONS_DIFFICULTY: NO
ADLS_ACUITY_SCORE: 32
ADLS_ACUITY_SCORE: 25
TRANSFERRING: 0-->ASSISTANCE NEEDED (DEVELOPMENTALLY APPROPRIATE)
ADLS_ACUITY_SCORE: 22
ADLS_ACUITY_SCORE: 25
ADLS_ACUITY_SCORE: 25
ADLS_ACUITY_SCORE: 34
ADLS_ACUITY_SCORE: 46
ADLS_ACUITY_SCORE: 24
ADLS_ACUITY_SCORE: 32
ADLS_ACUITY_SCORE: 22
ADLS_ACUITY_SCORE: 44
PATIENT'S_PREFERRED_MEANS_OF_COMMUNICATION: VERBAL
ADLS_ACUITY_SCORE: 22
ADLS_ACUITY_SCORE: 22
ADLS_ACUITY_SCORE: 25
ADLS_ACUITY_SCORE: 22
ADLS_ACUITY_SCORE: 25
ADLS_ACUITY_SCORE: 24
ADLS_ACUITY_SCORE: 24
ADLS_ACUITY_SCORE: 32
ADLS_ACUITY_SCORE: 24
ADLS_ACUITY_SCORE: 36
ADLS_ACUITY_SCORE: 32
ADLS_ACUITY_SCORE: 29
ADLS_ACUITY_SCORE: 24
ADLS_ACUITY_SCORE: 22
ADLS_ACUITY_SCORE: 25
ADLS_ACUITY_SCORE: 38
ADLS_ACUITY_SCORE: 32
ADLS_ACUITY_SCORE: 36
ADLS_ACUITY_SCORE: 40
ADLS_ACUITY_SCORE: 37
ADLS_ACUITY_SCORE: 44
ADLS_ACUITY_SCORE: 42
ADLS_ACUITY_SCORE: 22
ADLS_ACUITY_SCORE: 36
ADLS_ACUITY_SCORE: 35
ADLS_ACUITY_SCORE: 32
ADLS_ACUITY_SCORE: 24
ADLS_ACUITY_SCORE: 36
ADLS_ACUITY_SCORE: 34
ADLS_ACUITY_SCORE: 40
ADLS_ACUITY_SCORE: 24
ADLS_ACUITY_SCORE: 22
ADLS_ACUITY_SCORE: 32
ADLS_ACUITY_SCORE: 22
ADLS_ACUITY_SCORE: 36
ADLS_ACUITY_SCORE: 33
ADLS_ACUITY_SCORE: 27
ADLS_ACUITY_SCORE: 22
ADLS_ACUITY_SCORE: 35
ADLS_ACUITY_SCORE: 30
ADLS_ACUITY_SCORE: 32
ADLS_ACUITY_SCORE: 25
ADLS_ACUITY_SCORE: 24
DRESSING/BATHING: DRESSING DIFFICULTY, ASSISTANCE 1 PERSON
ADLS_ACUITY_SCORE: 34
ADLS_ACUITY_SCORE: 32
ADLS_ACUITY_SCORE: 22
ADLS_ACUITY_SCORE: 25
ADLS_ACUITY_SCORE: 25
ADLS_ACUITY_SCORE: 36
ADLS_ACUITY_SCORE: 22
ADLS_ACUITY_SCORE: 36
ADLS_ACUITY_SCORE: 40
ADLS_ACUITY_SCORE: 42
ADLS_ACUITY_SCORE: 36
ADLS_ACUITY_SCORE: 22
ADLS_ACUITY_SCORE: 27
ADLS_ACUITY_SCORE: 29
ADLS_ACUITY_SCORE: 24
ADLS_ACUITY_SCORE: 24
ADLS_ACUITY_SCORE: 36
ADLS_ACUITY_SCORE: 35
ADLS_ACUITY_SCORE: 32
ADLS_ACUITY_SCORE: 24
ADLS_ACUITY_SCORE: 36
ADLS_ACUITY_SCORE: 22
ADLS_ACUITY_SCORE: 22
ADLS_ACUITY_SCORE: 24
ADLS_ACUITY_SCORE: 35
ADLS_ACUITY_SCORE: 32
ADLS_ACUITY_SCORE: 35
ADLS_ACUITY_SCORE: 37
ADLS_ACUITY_SCORE: 35
ADLS_ACUITY_SCORE: 22
ADLS_ACUITY_SCORE: 24
ADLS_ACUITY_SCORE: 34
WALKING_OR_CLIMBING_STAIRS: STAIR CLIMBING DIFFICULTY, REQUIRES EQUIPMENT
ADLS_ACUITY_SCORE: 22
ADLS_ACUITY_SCORE: 32
ADLS_ACUITY_SCORE: 36
ADLS_ACUITY_SCORE: 26
ADLS_ACUITY_SCORE: 32
TOILETING: 0-->INDEPENDENT
TOILETING_ISSUES: NO
CHANGE_IN_FUNCTIONAL_STATUS_SINCE_ONSET_OF_CURRENT_ILLNESS/INJURY: YES
ADLS_ACUITY_SCORE: 27
ADLS_ACUITY_SCORE: 32
ADLS_ACUITY_SCORE: 36
ADLS_ACUITY_SCORE: 42
ADLS_ACUITY_SCORE: 22
ADLS_ACUITY_SCORE: 25
ADLS_ACUITY_SCORE: 24
ADLS_ACUITY_SCORE: 41
ADLS_ACUITY_SCORE: 24
ADLS_ACUITY_SCORE: 27
ADLS_ACUITY_SCORE: 32
DIFFICULTY_EATING/SWALLOWING: NO
FALL_HISTORY_WITHIN_LAST_SIX_MONTHS: YES
DEPENDENT_IADLS:: INDEPENDENT
ADLS_ACUITY_SCORE: 36
WALKING_OR_CLIMBING_STAIRS: STAIR CLIMBING DIFFICULTY, REQUIRES EQUIPMENT
ADLS_ACUITY_SCORE: 24
ADLS_ACUITY_SCORE: 22
ADLS_ACUITY_SCORE: 22
ADLS_ACUITY_SCORE: 40
ADLS_ACUITY_SCORE: 22
ADLS_ACUITY_SCORE: 44
ADLS_ACUITY_SCORE: 22
ADLS_ACUITY_SCORE: 32
ADLS_ACUITY_SCORE: 22
ADLS_ACUITY_SCORE: 32
ADLS_ACUITY_SCORE: 46
ADLS_ACUITY_SCORE: 24
ADLS_ACUITY_SCORE: 35
ADLS_ACUITY_SCORE: 32
ADLS_ACUITY_SCORE: 35
ADLS_ACUITY_SCORE: 35
ADLS_ACUITY_SCORE: 25
ADLS_ACUITY_SCORE: 24
ADLS_ACUITY_SCORE: 25
ADLS_ACUITY_SCORE: 24
ADLS_ACUITY_SCORE: 32
ADLS_ACUITY_SCORE: 24
ADLS_ACUITY_SCORE: 46
DEPENDENT_IADLS:: INDEPENDENT
DOING_ERRANDS_INDEPENDENTLY_DIFFICULTY: YES
ADLS_ACUITY_SCORE: 32
ADLS_ACUITY_SCORE: 28
DRESSING/BATHING_DIFFICULTY: NO
ADLS_ACUITY_SCORE: 24
ADLS_ACUITY_SCORE: 38
ADLS_ACUITY_SCORE: 22
ADLS_ACUITY_SCORE: 29
ADLS_ACUITY_SCORE: 22
ADLS_ACUITY_SCORE: 24
ADLS_ACUITY_SCORE: 24
ADLS_ACUITY_SCORE: 32
NUMBER_OF_TIMES_PATIENT_HAS_FALLEN_WITHIN_LAST_SIX_MONTHS: 2
ADLS_ACUITY_SCORE: 36
ADLS_ACUITY_SCORE: 28
ADLS_ACUITY_SCORE: 22
ADLS_ACUITY_SCORE: 40
ADLS_ACUITY_SCORE: 35
ADLS_ACUITY_SCORE: 25
ADLS_ACUITY_SCORE: 37
ADLS_ACUITY_SCORE: 24
ADLS_ACUITY_SCORE: 32
ADLS_ACUITY_SCORE: 25
ADLS_ACUITY_SCORE: 22
ADLS_ACUITY_SCORE: 22
ADLS_ACUITY_SCORE: 35
FALL_HISTORY_WITHIN_LAST_SIX_MONTHS: NO
ADLS_ACUITY_SCORE: 32
ADLS_ACUITY_SCORE: 27
ADLS_ACUITY_SCORE: 35
ADLS_ACUITY_SCORE: 25
ADLS_ACUITY_SCORE: 32
ADLS_ACUITY_SCORE: 22
ADLS_ACUITY_SCORE: 42
DOING_ERRANDS_INDEPENDENTLY_DIFFICULTY: YES
ADLS_ACUITY_SCORE: 34
ADLS_ACUITY_SCORE: 27
ADLS_ACUITY_SCORE: 28
ADLS_ACUITY_SCORE: 32
ADLS_ACUITY_SCORE: 22
ADLS_ACUITY_SCORE: 40
ADLS_ACUITY_SCORE: 32
ADLS_ACUITY_SCORE: 35
ADLS_ACUITY_SCORE: 22
ADLS_ACUITY_SCORE: 24
ADLS_ACUITY_SCORE: 42
ADLS_ACUITY_SCORE: 36
ADLS_ACUITY_SCORE: 36
ADLS_ACUITY_SCORE: 32
ADLS_ACUITY_SCORE: 24
ADLS_ACUITY_SCORE: 40
ADLS_ACUITY_SCORE: 36
ADLS_ACUITY_SCORE: 24
ADLS_ACUITY_SCORE: 22
ADLS_ACUITY_SCORE: 46
ADLS_ACUITY_SCORE: 42
ADLS_ACUITY_SCORE: 22
ADLS_ACUITY_SCORE: 29
ADLS_ACUITY_SCORE: 24
ADLS_ACUITY_SCORE: 46
FALL_HISTORY_WITHIN_LAST_SIX_MONTHS: YES
ADLS_ACUITY_SCORE: 32
ADLS_ACUITY_SCORE: 30
ADLS_ACUITY_SCORE: 24
ADLS_ACUITY_SCORE: 22
ADLS_ACUITY_SCORE: 32
ADLS_ACUITY_SCORE: 46
ADLS_ACUITY_SCORE: 36
ADLS_ACUITY_SCORE: 29
ADLS_ACUITY_SCORE: 22
ADLS_ACUITY_SCORE: 25
ADLS_ACUITY_SCORE: 34
ADLS_ACUITY_SCORE: 28
ADLS_ACUITY_SCORE: 22
ADLS_ACUITY_SCORE: 27
ADLS_ACUITY_SCORE: 24
ADLS_ACUITY_SCORE: 32
ADLS_ACUITY_SCORE: 40
WERE_AUXILIARY_AIDS_OFFERED?: YES
ADLS_ACUITY_SCORE: 25
ADLS_ACUITY_SCORE: 36
ADLS_ACUITY_SCORE: 24
ADLS_ACUITY_SCORE: 40
ADLS_ACUITY_SCORE: 24
ADLS_ACUITY_SCORE: 42
ADLS_ACUITY_SCORE: 40
ADLS_ACUITY_SCORE: 22
ADLS_ACUITY_SCORE: 40
ADLS_ACUITY_SCORE: 24
ADLS_ACUITY_SCORE: 25
ADLS_ACUITY_SCORE: 29
ADLS_ACUITY_SCORE: 38
ADLS_ACUITY_SCORE: 36
ADLS_ACUITY_SCORE: 38
ADLS_ACUITY_SCORE: 34
ADLS_ACUITY_SCORE: 35
ADLS_ACUITY_SCORE: 32
DIFFICULTY_COMMUNICATING: NO
ADLS_ACUITY_SCORE: 24
ADLS_ACUITY_SCORE: 25
ADLS_ACUITY_SCORE: 32
ADLS_ACUITY_SCORE: 35
ADLS_ACUITY_SCORE: 28
ADLS_ACUITY_SCORE: 25
ADLS_ACUITY_SCORE: 37
ADLS_ACUITY_SCORE: 24
ADLS_ACUITY_SCORE: 22
ADLS_ACUITY_SCORE: 22
ADLS_ACUITY_SCORE: 25
ADLS_ACUITY_SCORE: 24
ADLS_ACUITY_SCORE: 36
ADLS_ACUITY_SCORE: 24
ADLS_ACUITY_SCORE: 25
ADLS_ACUITY_SCORE: 25
ADLS_ACUITY_SCORE: 24
CHANGE_IN_FUNCTIONAL_STATUS_SINCE_ONSET_OF_CURRENT_ILLNESS/INJURY: NO
ADLS_ACUITY_SCORE: 24
ADLS_ACUITY_SCORE: 42
ADLS_ACUITY_SCORE: 32
ADLS_ACUITY_SCORE: 36
ADLS_ACUITY_SCORE: 24
ADLS_ACUITY_SCORE: 24
ADLS_ACUITY_SCORE: 32
ADLS_ACUITY_SCORE: 35
ADLS_ACUITY_SCORE: 35
ADLS_ACUITY_SCORE: 36
ADLS_ACUITY_SCORE: 28
ADLS_ACUITY_SCORE: 22
ADLS_ACUITY_SCORE: 37
ADLS_ACUITY_SCORE: 36
ADLS_ACUITY_SCORE: 25
ADLS_ACUITY_SCORE: 46
ADLS_ACUITY_SCORE: 28
ADLS_ACUITY_SCORE: 28
ADLS_ACUITY_SCORE: 40
ADLS_ACUITY_SCORE: 36
ADLS_ACUITY_SCORE: 35
ADLS_ACUITY_SCORE: 24
ADLS_ACUITY_SCORE: 36
ADLS_ACUITY_SCORE: 37
ADLS_ACUITY_SCORE: 36
ADLS_ACUITY_SCORE: 37
TRANSFERRING: 1-->ASSISTANCE (EQUIPMENT/PERSON) NEEDED
ADLS_ACUITY_SCORE: 25
ADLS_ACUITY_SCORE: 25
ADLS_ACUITY_SCORE: 24
ADLS_ACUITY_SCORE: 44
ADLS_ACUITY_SCORE: 32
ADLS_ACUITY_SCORE: 37
ADLS_ACUITY_SCORE: 32
ADLS_ACUITY_SCORE: 36
ADLS_ACUITY_SCORE: 40
ADLS_ACUITY_SCORE: 24
ADLS_ACUITY_SCORE: 24
ADLS_ACUITY_SCORE: 22
ADLS_ACUITY_SCORE: 46
ADLS_ACUITY_SCORE: 22
ADLS_ACUITY_SCORE: 28
TOILETING_ISSUES: YES
ADLS_ACUITY_SCORE: 22
ADLS_ACUITY_SCORE: 27
ADLS_ACUITY_SCORE: 22
ADLS_ACUITY_SCORE: 24
ADLS_ACUITY_SCORE: 32
ADLS_ACUITY_SCORE: 24
WALKING_OR_CLIMBING_STAIRS: STAIR CLIMBING DIFFICULTY, REQUIRES EQUIPMENT
ADLS_ACUITY_SCORE: 22
ADLS_ACUITY_SCORE: 44
ADLS_ACUITY_SCORE: 36
NUMBER_OF_TIMES_PATIENT_HAS_FALLEN_WITHIN_LAST_SIX_MONTHS: -1
ADLS_ACUITY_SCORE: 25
ADLS_ACUITY_SCORE: 40
ADLS_ACUITY_SCORE: 22
ADLS_ACUITY_SCORE: 24
ADLS_ACUITY_SCORE: 22
ADLS_ACUITY_SCORE: 24
ADLS_ACUITY_SCORE: 24
ADLS_ACUITY_SCORE: 40
ADLS_ACUITY_SCORE: 29
ADLS_ACUITY_SCORE: 46
ADLS_ACUITY_SCORE: 24
ADLS_ACUITY_SCORE: 24
ADLS_ACUITY_SCORE: 22
ADLS_ACUITY_SCORE: 40
ADLS_ACUITY_SCORE: 24
ADLS_ACUITY_SCORE: 22
ADLS_ACUITY_SCORE: 36
ADLS_ACUITY_SCORE: 25
ADLS_ACUITY_SCORE: 35
ADLS_ACUITY_SCORE: 25
ADLS_ACUITY_SCORE: 22
ADLS_ACUITY_SCORE: 22
ADLS_ACUITY_SCORE: 42
ADLS_ACUITY_SCORE: 35
DIFFICULTY_EATING/SWALLOWING: NO
WEAR_GLASSES_OR_BLIND: YES
ADLS_ACUITY_SCORE: 22
ADLS_ACUITY_SCORE: 32
ADLS_ACUITY_SCORE: 40
ADLS_ACUITY_SCORE: 32
ADLS_ACUITY_SCORE: 40
ADLS_ACUITY_SCORE: 25
ADLS_ACUITY_SCORE: 36
ADLS_ACUITY_SCORE: 24
ADLS_ACUITY_SCORE: 32
ADLS_ACUITY_SCORE: 32
ADLS_ACUITY_SCORE: 36
ADLS_ACUITY_SCORE: 25
ADLS_ACUITY_SCORE: 24
ADLS_ACUITY_SCORE: 36
ADLS_ACUITY_SCORE: 32
ADLS_ACUITY_SCORE: 25
ADLS_ACUITY_SCORE: 36
ADLS_ACUITY_SCORE: 24
ADLS_ACUITY_SCORE: 25
ADLS_ACUITY_SCORE: 27
ADLS_ACUITY_SCORE: 24
WEAR_GLASSES_OR_BLIND: YES
ADLS_ACUITY_SCORE: 46
ADLS_ACUITY_SCORE: 36
ADLS_ACUITY_SCORE: 37
CONCENTRATING,_REMEMBERING_OR_MAKING_DECISIONS_DIFFICULTY: NO
ADLS_ACUITY_SCORE: 35
ADLS_ACUITY_SCORE: 24
ADLS_ACUITY_SCORE: 32
ADLS_ACUITY_SCORE: 27
ADLS_ACUITY_SCORE: 25
ADLS_ACUITY_SCORE: 22
ADLS_ACUITY_SCORE: 28
ADLS_ACUITY_SCORE: 36
ADLS_ACUITY_SCORE: 24
ADLS_ACUITY_SCORE: 22
ADLS_ACUITY_SCORE: 24
ADLS_ACUITY_SCORE: 36
ADLS_ACUITY_SCORE: 36
DRESS: 0-->ASSISTANCE NEEDED (DEVELOPMENTALLY APPROPRIATE)
ADLS_ACUITY_SCORE: 25
ADLS_ACUITY_SCORE: 24
ADLS_ACUITY_SCORE: 22
ADLS_ACUITY_SCORE: 32
ADLS_ACUITY_SCORE: 22
ADLS_ACUITY_SCORE: 40
ADLS_ACUITY_SCORE: 32
ADLS_ACUITY_SCORE: 46
ADLS_ACUITY_SCORE: 46
ADLS_ACUITY_SCORE: 24
ADLS_ACUITY_SCORE: 22
ADLS_ACUITY_SCORE: 34
ADLS_ACUITY_SCORE: 34
ADLS_ACUITY_SCORE: 22
ADLS_ACUITY_SCORE: 36
DEPENDENT_IADLS:: INDEPENDENT
ADLS_ACUITY_SCORE: 27
ADLS_ACUITY_SCORE: 22
ADLS_ACUITY_SCORE: 36
ADLS_ACUITY_SCORE: 27
ADLS_ACUITY_SCORE: 27
TRANSFERRING: 0-->ASSISTANCE NEEDED (DEVELOPMENTALLY APPROPRIATE)
ADLS_ACUITY_SCORE: 28
ADLS_ACUITY_SCORE: 24
ADLS_ACUITY_SCORE: 22
ADLS_ACUITY_SCORE: 36
ADLS_ACUITY_SCORE: 36
ADLS_ACUITY_SCORE: 24
ADLS_ACUITY_SCORE: 36
WEAR_GLASSES_OR_BLIND: YES
ADLS_ACUITY_SCORE: 40
ADLS_ACUITY_SCORE: 22
ADLS_ACUITY_SCORE: 35
ADLS_ACUITY_SCORE: 27
ADLS_ACUITY_SCORE: 36
ADLS_ACUITY_SCORE: 24
ADLS_ACUITY_SCORE: 42
ADLS_ACUITY_SCORE: 24
ADLS_ACUITY_SCORE: 36
CHANGE_IN_FUNCTIONAL_STATUS_SINCE_ONSET_OF_CURRENT_ILLNESS/INJURY: NO
ADLS_ACUITY_SCORE: 22
ADLS_ACUITY_SCORE: 32
ADLS_ACUITY_SCORE: 36
ADLS_ACUITY_SCORE: 36
ADLS_ACUITY_SCORE: 32
ADLS_ACUITY_SCORE: 36
ADLS_ACUITY_SCORE: 30
ADLS_ACUITY_SCORE: 28
DIFFICULTY_EATING/SWALLOWING: NO
ADLS_ACUITY_SCORE: 36
ADLS_ACUITY_SCORE: 35
ADLS_ACUITY_SCORE: 22
ADLS_ACUITY_SCORE: 22
ADLS_ACUITY_SCORE: 24
DESCRIBE_HEARING_LOSS: BILATERAL HEARING LOSS
ADLS_ACUITY_SCORE: 35
ADLS_ACUITY_SCORE: 22
ADLS_ACUITY_SCORE: 28
ADLS_ACUITY_SCORE: 24
ADLS_ACUITY_SCORE: 32
ADLS_ACUITY_SCORE: 32
CONCENTRATING,_REMEMBERING_OR_MAKING_DECISIONS_DIFFICULTY: NO
ADLS_ACUITY_SCORE: 22
DOING_ERRANDS_INDEPENDENTLY_DIFFICULTY: NO
ADLS_ACUITY_SCORE: 36
ADLS_ACUITY_SCORE: 36
ADLS_ACUITY_SCORE: 22
ADLS_ACUITY_SCORE: 25
ADLS_ACUITY_SCORE: 42
ADLS_ACUITY_SCORE: 32
ADLS_ACUITY_SCORE: 22
ADLS_ACUITY_SCORE: 24
ADLS_ACUITY_SCORE: 36
ADLS_ACUITY_SCORE: 37
ADLS_ACUITY_SCORE: 25
ADLS_ACUITY_SCORE: 24
ADLS_ACUITY_SCORE: 22
ADLS_ACUITY_SCORE: 22
ADLS_ACUITY_SCORE: 36
ADLS_ACUITY_SCORE: 44
ADLS_ACUITY_SCORE: 24
ADLS_ACUITY_SCORE: 22
ADLS_ACUITY_SCORE: 40
ADLS_ACUITY_SCORE: 22
ADLS_ACUITY_SCORE: 37
ADLS_ACUITY_SCORE: 24
ADLS_ACUITY_SCORE: 22
ADLS_ACUITY_SCORE: 24
ADLS_ACUITY_SCORE: 22
ADLS_ACUITY_SCORE: 38
WALKING_OR_CLIMBING_STAIRS_DIFFICULTY: YES
ADLS_ACUITY_SCORE: 36
ADLS_ACUITY_SCORE: 37
ADLS_ACUITY_SCORE: 24
ADLS_ACUITY_SCORE: 38
ADLS_ACUITY_SCORE: 25
ADLS_ACUITY_SCORE: 24
ADLS_ACUITY_SCORE: 46
ADLS_ACUITY_SCORE: 28
ADLS_ACUITY_SCORE: 32
ADLS_ACUITY_SCORE: 35
ADLS_ACUITY_SCORE: 40
ADLS_ACUITY_SCORE: 24
ADLS_ACUITY_SCORE: 44
ADLS_ACUITY_SCORE: 24
ADLS_ACUITY_SCORE: 25
ADLS_ACUITY_SCORE: 24
ADLS_ACUITY_SCORE: 22
FALL_HISTORY_WITHIN_LAST_SIX_MONTHS: NO
ADLS_ACUITY_SCORE: 36
ADLS_ACUITY_SCORE: 34
ADLS_ACUITY_SCORE: 36
ADLS_ACUITY_SCORE: 37
ADLS_ACUITY_SCORE: 42
ADLS_ACUITY_SCORE: 22
ADLS_ACUITY_SCORE: 34
ADLS_ACUITY_SCORE: 34
ADLS_ACUITY_SCORE: 22
ADLS_ACUITY_SCORE: 37
ADLS_ACUITY_SCORE: 22
ADLS_ACUITY_SCORE: 32
HEARING_DIFFICULTY_OR_DEAF: YES
TRANSFERRING: 0-->ASSISTANCE NEEDED (DEVELOPMENTALLY APPROPRIATE)
ADLS_ACUITY_SCORE: 24
ADLS_ACUITY_SCORE: 36
ADLS_ACUITY_SCORE: 29
ADLS_ACUITY_SCORE: 27
ADLS_ACUITY_SCORE: 36
THE_FOLLOWING_AIDS_WERE_PROVIDED;: PATIENT DECLINED OFFER OF AUXILIARY AIDS
ADLS_ACUITY_SCORE: 35
DRESSING/BATHING_DIFFICULTY: NO
ADLS_ACUITY_SCORE: 36
ADLS_ACUITY_SCORE: 24
ADLS_ACUITY_SCORE: 28
ADLS_ACUITY_SCORE: 25
ADLS_ACUITY_SCORE: 24
ADLS_ACUITY_SCORE: 40
ADLS_ACUITY_SCORE: 40
ADLS_ACUITY_SCORE: 29
ADLS_ACUITY_SCORE: 22
ADLS_ACUITY_SCORE: 29
ADLS_ACUITY_SCORE: 32
ADLS_ACUITY_SCORE: 24
ADLS_ACUITY_SCORE: 24
ADLS_ACUITY_SCORE: 32
ADLS_ACUITY_SCORE: 25
ADLS_ACUITY_SCORE: 24
ADLS_ACUITY_SCORE: 35
ADLS_ACUITY_SCORE: 42
ADLS_ACUITY_SCORE: 36
ADLS_ACUITY_SCORE: 44
ADLS_ACUITY_SCORE: 35
ADLS_ACUITY_SCORE: 29
ADLS_ACUITY_SCORE: 35
ADLS_ACUITY_SCORE: 36
ADLS_ACUITY_SCORE: 24
ADLS_ACUITY_SCORE: 22
ADLS_ACUITY_SCORE: 35
ADLS_ACUITY_SCORE: 44
ADLS_ACUITY_SCORE: 24
ADLS_ACUITY_SCORE: 24
ADLS_ACUITY_SCORE: 35
ADLS_ACUITY_SCORE: 46
ADLS_ACUITY_SCORE: 24
USE_OF_HEARING_ASSISTIVE_DEVICES: BILATERAL HEARING AIDS
ADLS_ACUITY_SCORE: 25
ADLS_ACUITY_SCORE: 46
ADLS_ACUITY_SCORE: 36
CONCENTRATING,_REMEMBERING_OR_MAKING_DECISIONS_DIFFICULTY: NO
ADLS_ACUITY_SCORE: 36
ADLS_ACUITY_SCORE: 36
ADLS_ACUITY_SCORE: 22
ADLS_ACUITY_SCORE: 29
ADLS_ACUITY_SCORE: 38
ADLS_ACUITY_SCORE: 22
ADLS_ACUITY_SCORE: 24
ADLS_ACUITY_SCORE: 29
ADLS_ACUITY_SCORE: 29
ADLS_ACUITY_SCORE: 42
ADLS_ACUITY_SCORE: 22
ADLS_ACUITY_SCORE: 32
ADLS_ACUITY_SCORE: 34
ADLS_ACUITY_SCORE: 32
DRESS: 0-->INDEPENDENT
ADLS_ACUITY_SCORE: 25
ADLS_ACUITY_SCORE: 24
ADLS_ACUITY_SCORE: 35
ADLS_ACUITY_SCORE: 24
ADLS_ACUITY_SCORE: 37
ADLS_ACUITY_SCORE: 24
ADLS_ACUITY_SCORE: 36
ADLS_ACUITY_SCORE: 44
TOILETING: 0-->INDEPENDENT
ADLS_ACUITY_SCORE: 34
ADLS_ACUITY_SCORE: 27
ADLS_ACUITY_SCORE: 24
ADLS_ACUITY_SCORE: 44
ADLS_ACUITY_SCORE: 25
ADLS_ACUITY_SCORE: 27
ADLS_ACUITY_SCORE: 32
ADLS_ACUITY_SCORE: 22
ADLS_ACUITY_SCORE: 44
ADLS_ACUITY_SCORE: 22
ADLS_ACUITY_SCORE: 46
ADLS_ACUITY_SCORE: 22
ADLS_ACUITY_SCORE: 36
ADLS_ACUITY_SCORE: 36
ADLS_ACUITY_SCORE: 46
ADLS_ACUITY_SCORE: 36
ADLS_ACUITY_SCORE: 30
ADLS_ACUITY_SCORE: 36
ADLS_ACUITY_SCORE: 38
ADLS_ACUITY_SCORE: 35
ADLS_ACUITY_SCORE: 28
ADLS_ACUITY_SCORE: 22
ADLS_ACUITY_SCORE: 27
ADLS_ACUITY_SCORE: 40
ADLS_ACUITY_SCORE: 25
ADLS_ACUITY_SCORE: 35
VISION_MANAGEMENT: GLASSES
ADLS_ACUITY_SCORE: 42
ADLS_ACUITY_SCORE: 34
ADLS_ACUITY_SCORE: 27
ADLS_ACUITY_SCORE: 40
ADLS_ACUITY_SCORE: 35
ADLS_ACUITY_SCORE: 42
ADLS_ACUITY_SCORE: 44
ADLS_ACUITY_SCORE: 24

## 2023-01-01 ASSESSMENT — PATIENT HEALTH QUESTIONNAIRE - PHQ9
SUM OF ALL RESPONSES TO PHQ QUESTIONS 1-9: 3
SUM OF ALL RESPONSES TO PHQ QUESTIONS 1-9: 4

## 2023-01-01 ASSESSMENT — PAIN SCALES - GENERAL
PAINLEVEL: NO PAIN (0)

## 2023-01-03 NOTE — PROGRESS NOTES
Weekly OPAT Review    Date of labs reviewed: December 27  Abnormal labs: Yes   Basic metabolic profile, CBC with differential, Hepatic function  normal/abnormal: Abnormal--Alk phos, ALT/AST, BUN, Creat/GFR.  Provider contacted with abnormal labs:No  Interventions included: reckeck weekly   Next date to recheck labs: January03      Pt's labs have been similar to when he was hospitalized.

## 2023-01-04 NOTE — PROGRESS NOTES
ANTICOAGULATION MANAGEMENT     Eliseo Tanner 67 year old male is on warfarin with subtherapeutic INR result. (Goal INR 1.7-2.3)    Recent labs: (last 7 days)     01/03/23  1015   INR 1.4*       ASSESSMENT       Source(s): Chart Review and Patient/Caregiver Call       Warfarin doses taken: Warfarin taken as instructed    Diet: No new diet changes identified    New illness, injury, or hospitalization: No    Medication/supplement changes: None noted    Signs or symptoms of bleeding or clotting: No    Previous INR: Supratherapeutic    Additional findings: None       PLAN     Recommended plan for no diet, medication or health factor changes affecting INR     Dosing Instructions: booster dose then Increase your warfarin dose (7% change) with next INR in 1 week       Summary  As of 1/4/2023    Full warfarin instructions:  1/4: 6 mg; Otherwise 4 mg every day   Next INR check:  1/10/2023             Telephone call with Eliseo who verbalizes understanding and agrees to plan and who agrees to plan and repeated back plan correctly    Patient using outside facility for labs    Education provided:     Taking warfarin: Importance of taking warfarin as instructed    Goal range and lab monitoring: goal range and significance of current result, Importance of therapeutic range and Importance of following up at instructed interval    Plan made per [facility] anticoagulation protocol and per LVAD protocol    Gloria Abbasi RN  Anticoagulation Clinic  1/4/2023    _______________________________________________________________________     Anticoagulation Episode Summary     Current INR goal:  1.7-2.3   TTR:  38.3 % (11.1 mo)   Target end date:  Indefinite   Send INR reminders to:  ANTICOAG LVAD    Indications    LVAD (left ventricular assist device) present (H) [Z95.811]  Chronic systolic congestive heart failure (H) [I50.22]  Paroxysmal atrial fibrillation (H) [I48.0]           Comments:  Follow VAD Anticoag protocol:Yes: HeartMate 3    Bridging: Call MD each time   Date VAD placed: 2/18/2020 5/6/22 [employer] [facility]         Anticoagulation Care Providers     Provider Role Specialty Phone number    Nader Cisneros MD Referring Cardiovascular Disease [phone]

## 2023-01-06 NOTE — PROGRESS NOTES
[facility] CORE Clinic - CardioMEMS Reading Review    January 6, 2023, 0853   CardioMems period: 12/19/22 - 1/17/2023      CardioMEMS reviewed and routed to patient's provider, Laine BARRY NP.     Current diuretic: Bumex 6 mg BID.  Hydrochlorothiazide 75 mg every Wednesday and Saturday      Current potassium chloride dose:  Potassium 80mEq BID w/ an extra 60 mEq Potassium on Wednesdays & Saturdays    Symptoms: None, pt feeling well.  Weights: Today, going back: 183, 182, 178 for a few days    Changes to plan of care today: None    Current Threshold parameters: 21 - 24    Today's Waveform:       Readings:         RHC Date Wedge Pressure MEMS PAD during cath  (average of the 3 values)     Sept 20, 2022 w/MEMS implant     15 mmHg   16 mmHg

## 2023-01-06 NOTE — PROGRESS NOTES
Dr. Bhatti,   Reviewed last couple weeks of labs and they have been similar to the previous results over the last few months. Is there anything additional that we should be concerned about?     Dr. Plascencia sees him on 1/13-but virtual     Weekly OPAT Review    Date of labs reviewed: January 03  Abnormal labs: Yes   CBC with differential, Comprehensive metabolic profile, C-reactive protein- inflammation, CK.   normal/abnormal: Mildly abnormal, but acceptable  Provider contacted with abnormal labs:Yes  Interventions included: recheck weekly labs   Next date to recheck labs: January10    CMP-Pt has Hx of CKD and CHF/LVAD driveline.    Dapto is known to cause LFT elevations, do you have a treshold on AST/ALT     ALT -not quite 2x the normal level (was normal prior to starting Dapto)   AST - not quite 3x normal level (has been elevated prior to starting dapto)

## 2023-01-10 NOTE — PROGRESS NOTES
[facility] CORE Clinic - CardioMEMS Reading Review    January 10, 2023, 1248   CardioMems period: 12/19/22 - 1/17/2023      CardioMEMS reviewed and routed to patient's provider, Laine BARRY NP.     Current diuretic: Bumex 6 mg BID.  Hydrochlorothiazide 75 mg every Wednesday and Saturday      Current potassium chloride dose:  Potassium 80mEq BID w/ an extra 60 mEq Potassium on Wednesdays & Saturdays    Symptoms: None  Weights: Today, going back: 183, 190, 188, 185, 182, 184  Changes to plan of care today: None    Current Threshold parameters: 21 - 24    Today's Waveform:       Readings:         RHC Date Wedge Pressure MEMS PAD during cath  (average of the 3 values)     Sept 20, 2022 w/MEMS implant     15 mmHg   16 mmHg

## 2023-01-12 NOTE — PROGRESS NOTES
Weekly OPAT Review    Date of labs reviewed: January 10  Abnormal labs: Yes   Basic metabolic profile, CBC with differential, Comprehensive metabolic profile, C-reactive protein, inflammation, CK  normal/abnormal: Stable  Provider contacted with abnormal labs:No  Interventions included: repeat weekly labs  Next date to recheck labs: January17    Continue to watch alk phos and LFT's for elevations.

## 2023-01-12 NOTE — PROGRESS NOTES
Eliseo is a 67 year old who is being evaluated via a billable video visit.      How would you like to obtain your AVS? MyChart  If the video visit is dropped, the invitation should be resent by: Text to cell phone: [phone]  Will anyone else be joining your video visit? Elizabeth DOMINGUEZ    Video-Visit Details    Type of service:  Video Visit   Video Start Time: 1520  Video End Time:1531  Patient Location: Home  Platform: [employer] attempted, converted to phone due to technology issues.     HPI:   Eliseo Tanner is a 65 year old male with NICM (LVEF 15-20%), s/p HM III 2/18/20 (post op c/b retrosternal hematoma with bleeding in the lungs), s/p ICD, h/o MSSA driveline infection and bacteremia 11/5/20 on prophylactic cephalexin, h/o VT on amiodarone, nonobstructive CAD, severe MR, atrial fibrillation on warfarin, HTN, ABHINAV requiring CPAP, CKD Stage IV, DM Type II, Hyperlipidemia, and history of HIT. He underwent hospitalization from 2/15/21-3/17/21 at [facility] for mixed cardiogenic and distributive shock with an intermittent wide complex tachycardia. Initially requiring vasopressors and inotropes, intubated for hypoxemia. He was treated for legionella pneumonia and since extubated. Transitioned to iHD with improving renal status with cession of HD 3/10. He presents to clinic for hospital follow up.    He underwent I & D due to MRSA/MSSA drive line infection with discharge on wound vac. He is tolerating antibiotics and notes decline in wound vac output.  118/80, MAP-92 per hospital electronic cuff. He notes 7 lb weight loss since hydrochlorothiazide dose yesterday. He denies lightheadedness, dizziness, changes in speech, fever, chills, chest pain, SOB, MIRANDA, PND, cough, nausea, vomiting, diarrhea, melena, hematochezia, and LE edema.     VAD Interrogation on 1/12/2023: Denies alarms.     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    SOCIAL HISTORY:  Social History     Socioeconomic History     Marital status:    Tobacco Use     Smoking status: Former     Types: Cigarettes     Quit date: 1994     Years since quittin.8     Smokeless tobacco: Never   Substance and Sexual Activity     Alcohol use: Not Currently     Drug use: Never       CURRENT MEDICATIONS:  Outpatient Medications Prior to Visit   Medication Sig Dispense Refill     allopurinol (ZYLOPRIM) 100 MG tablet 2 tablets (200 mg) by Oral or Feeding Tube route daily 60 tablet 1     amiodarone (PACERONE) 200 MG tablet TAKE 1 TABLET BY MOUTH ONCE DAILY 90 tablet 3     amLODIPine (NORVASC) 5 MG tablet Take 2 tablets (10 mg) by mouth daily 180 tablet 3     aspirin (ASA) 81 MG EC tablet Take 1 tablet (81 mg) by mouth daily 90 tablet 3     B Complex-C-Folic Acid (RENAL) 1 MG CAPS Take 1 capsule by mouth daily 30 capsule 0     bumetanide (BUMEX) 2 MG tablet Take 3 tablets (6 mg) by mouth 2 times daily 540 tablet 3     co-enzyme Q-10 200 MG CAPS 200 mg by Oral or Feeding Tube route daily       dapagliflozin (FARXIGA) 5 MG TABS tablet Take 5 mg by mouth daily       ferrous sulfate (FEROSUL) 325 (65 Fe) MG tablet Take 325 mg by mouth daily (with breakfast)       finasteride (PROSCAR) 5 MG tablet Take 1 tablet (5 mg) by mouth daily Helps with urinary retention. 30 tablet 0     FLUoxetine (PROZAC) 10 MG capsule Take 30 mg by mouth daily       hydrALAZINE (APRESOLINE) 100 MG tablet Take 1 tablet (100 mg) by mouth 3 times daily 270 tablet 3     hydrochlorothiazide (HYDRODIURIL) 25 MG tablet Take 75 mg (3 tabs) every Wednesday & Saturday's 90 tablet 3     insulin pen needle (32G X  4 MM) 32G X 4 MM miscellaneous  (Patient not taking: Reported on 12/8/2022)       isosorbide mononitrate (IMDUR) 30 MG 24 hr tablet Take 3 tablets (90 mg) by mouth daily 270 tablet 3     levothyroxine (SYNTHROID/LEVOTHROID) 75 MCG tablet Take 1 tablet (75 mcg) by mouth every morning (before breakfast) 30 tablet 1     magnesium oxide (MAG-OX) 400 MG tablet 1 tablet (400 mg) by Oral or Feeding Tube route 2 times daily 30 tablet 1     omeprazole (PRILOSEC) 20 MG DR capsule Take 20 mg by mouth daily       potassium chloride ER (KLOR-CON M) 20 MEQ CR tablet Take 80 mEq (4 tabs) in the AM and 80 mEq (4 tabs) in the PM. Take an additional 60 mEq on Wednesdays and Saturdays with HCTZ 720 tablet 3     senna-docusate (SENOKOT-S/PERICOLACE) 8.6-50 MG tablet Take 1 tablet by mouth 2 times daily as needed for constipation  (Patient not taking: Reported on 12/26/2022)       tamsulosin (FLOMAX) 0.4 MG capsule Take 2 capsules (0.8 mg) by mouth daily This med helps with urinary retention 30 capsule 0     warfarin ANTICOAGULANT (COUMADIN) 2 MG tablet Take 2.5 tablets (5 mg) by mouth daily       zolpidem (AMBIEN) 5 MG tablet Take 5 mg by mouth nightly as needed for Insomnia       No facility-administered medications prior to visit.       ROS:   CONSTITUTIONAL: Denies fever, chills, fatigue, or weight fluctuations.   HEENT: Denies headache, vision changes, and changes in speech.   CV: Refer to HPI.   PULMONARY:Denies shortness of breath, cough, or previous TB exposure.   GI:Denies nausea, vomiting, diarrhea, and abdominal pain. Bowel movements are regular.   :Denies urinary alterations, dysuria, urinary frequency, hematuria, and abnormal drainage.   EXT:Denies lower extremity edema.   SKIN:Denies abnormal rashes or lesions.   MUSCULOSKELETAL:Denies upper or lower extremity weakness and pain.   NEUROLOGIC:Denies lightheadedness, dizziness, seizures, or upper or lower extremity paresthesia.     EXAM:  MAP-92    The following Labs were  reviewed today:  CBC RESULTS:  Lab Results   Component Value Date    WBC 7.5 12/20/2022    WBC 7.9 03/19/2021    RBC 3.16 (L) 12/20/2022    RBC 3.88 03/19/2021    HGB 9.2 (L) 12/20/2022    HGB 10.1 (A) 03/19/2021    HCT 29.8 (L) 12/20/2022    HCT 32.2 03/19/2021    MCV 94 12/20/2022    MCV 83.0 03/19/2021    MCH 29.1 12/20/2022    MCH 26.0 03/19/2021    MCHC 30.9 (L) 12/20/2022    MCHC 31.4 03/19/2021    RDW 15.5 (H) 12/20/2022    RDW 22.8 03/19/2021     12/20/2022     03/19/2021     03/17/2021       CMP RESULTS:  Lab Results   Component Value Date     12/20/2022     04/09/2021    POTASSIUM 4.0 12/20/2022    POTASSIUM 4.2 05/03/2022    POTASSIUM 4.2 04/09/2021    CHLORIDE 94 (L) 01/03/2023    CHLORIDE 103 04/09/2021    CO2 29 12/20/2022    CO2 26 05/03/2022    CO2 28 04/09/2021    ANIONGAP 9 12/20/2022    ANIONGAP 11 05/03/2022    ANIONGAP 8 04/09/2021     (H) 12/20/2022     (H) 05/03/2022     (A) 04/09/2021    BUN 25.6 (H) 12/20/2022    BUN 46 (H) 05/03/2022    BUN 35 04/09/2021    CR 1.35 (H) 12/20/2022    CR 1.6 04/09/2021    GFRESTIMATED 57 (L) 12/20/2022    GFRESTIMATED 34 03/19/2021    GFRESTBLACK 39 (L) 03/17/2021    CALOS 8.5 (L) 12/20/2022    CALOS 9.0 04/09/2021    BILITOTAL 0.4 12/20/2022    BILITOTAL 0.5 03/19/2021    ALBUMIN 3.1 (L) 12/20/2022    ALBUMIN 3.7 05/03/2022    ALBUMIN 4.4 03/19/2021    ALKPHOS 285 (H) 12/20/2022    ALKPHOS 138.0 03/19/2021    ALT 81 (H) 12/20/2022    ALT 35 03/19/2021     (H) 12/20/2022    AST 60.0 03/19/2021        INR RESULTS:  Lab Results   Component Value Date    INR 1.4 (H) 01/03/2023    INR 1.7 (L) 11/23/2022    INR 3.1 (A) 07/09/2021       Lab Results   Component Value Date    MAG 1.9 12/20/2022    MAG 1.5 (L) 03/17/2021     Lab Results   Component Value Date    NTBNPI 3,432 (H) 12/10/2022    NTBNPI 27,781 (H) 02/15/2021     Lab Results   Component Value Date    NTBNP 4,416 (H) 02/10/2022       Assessment and  Plan:   Eliseo Tanner is a 65 year old male with NICM (LVEF 15-20%), s/p HM III 2/18/20 (post op c/b retrosternal hematoma with bleeding in the lungs), s/p ICD, h/o MSSA driveline infection and bacteremia 11/5/20 on prophylactic cephalexin, h/o VT on amiodarone, nonobstructive CAD, severe MR, atrial fibrillation on warfarin, HTN, ABHINAV requiring CPAP, CKD Stage IV, DM Type II, Hyperlipidemia, and history of HIT. He presents for hospital follow up via virtual visit at euvolemic state with stable transaminitis.     MRSA/MSSA Drive line infection s/p I & D.   - Continue wound vac per outside clinic.   - Continue Daptomycin.   - Appreciate management per ID. Discussed transaminitis with ID, continue to trend.   - continue weekly: CBC w/ diff, CMP, CK.     Chronic SCHF s/p HM III LVAD. Elevated LDH in setting of infection, resolved. Implanted 2/18/20.   Stage D, NYHA Class III  ACEi/ARB Lisinopril held in setting of renal failure. Hydralazine 100 mg po TID.   BB Deferred in the past due to   Aldosterone antagonist contraindicated due to renal dysfunction  SCD prophylaxis ICD  Fluid status euvolemic per history. Continue bumex 6 mg po BID. hydrochlorothiazide 75 mg po every Wednesday and Saturday.   MAP: unable to obtain at home. Renal appointment in AM.   LDH: Obtain with next set of labs.   Anticoagulation: Coumadin per AC. INR-1.8, Goal 2-3.   Antiplatelet: ASA 81 mg po daily   SGLT2I: Continue Farxiga 5 mg po daily.      Acute on Chronic CKD Stave IV secondary to shock. Cr peaked at 7.1. Last HD run 3/10. Tunneled line removed 3/16/21.  - Follows with Nephrology outpatient.   - Cr-1.7.     Wide Complex Tachycardia. History of Afib s/p DCCV and aflutter. Did not respond to Adenosine on admission. Underlying atrial flutter with 3:1. S/p CHAMP and DCCV, in SR currently.   - Continue Amiodarone.      Chronic microcytic/hypochromic anemia. Coagulopathy related to sepsis, resolved. 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  - follow up with [facility] (Dr. Ibarra) as recommended.      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 Platelets 250K --> ~ 90K with documented history of exposure to unfractionate heparin products at OSH prior to his installation at the [facility]. HIT panel tests at that time with first negative and second antibody test was positive. No known thrombosis events. DAVINA antibody negative. Per Dr. James with hematology no other cause for isolated thrombocytopenia. Immediate recovery of plts following d/c of heparin. 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   - avoid heparin products.   - continue warfarin    HTN.   - Continue Norvasc and Hydralazine.     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 Transaminitis. Found to have elevated , , Alk Phos 330 inpatient. Likely predominantly cholestatic given R factor 1. Possibly related to congestion. CT with chloeithiasis without cholecysitits.   - Lipitor remains on hold.   - ALT and AST stable. Discussed Daptomycin involvement with ID. Continue to monitor weekly.      DM Type II, controlled. Hgb A1C 6.8 1/6/21.  - Management per PCP.   - Continue Farxiga.     Follow up VAD LISA in 2 weeks and ID as scheduled with weekly labs.     Laine Leon, JUAN CNP  1/12/2023          CC  BRANNON GAVIN

## 2023-01-12 NOTE — LETTER
1/12/2023      RE: Eliseo Tanner  [address]  [facility] [address]       Dear Colleague,    Thank you for the opportunity to participate in the care of your patient, Eliseo Tanner, at the [facility] [place] [facility]. Please see a copy of my visit note below.        HPI:   Eliseo Tanner is a 65 year old male with NICM (LVEF 15-20%), s/p HM III 2/18/20 (post op c/b retrosternal hematoma with bleeding in the lungs), s/p ICD, h/o MSSA driveline infection and bacteremia 11/5/20 on prophylactic cephalexin, h/o VT on amiodarone, nonobstructive CAD, severe MR, atrial fibrillation on warfarin, HTN, ABHINAV requiring CPAP, CKD Stage IV, DM Type II, Hyperlipidemia, and history of HIT. He underwent hospitalization from 2/15/21-3/17/21 at [facility] for mixed cardiogenic and distributive shock with an intermittent wide complex tachycardia. Initially requiring vasopressors and inotropes, intubated for hypoxemia. He was treated for legionella pneumonia and since extubated. Transitioned to iHD with improving renal status with cession of HD 3/10. He presents to clinic for hospital follow up.    He underwent I & D due to MRSA/MSSA drive line infection with discharge on wound vac. He is tolerating antibiotics and notes decline in wound vac output.  118/80, MAP-92 per hospital electronic cuff. He notes 7 lb weight loss since hydrochlorothiazide dose yesterday. He denies lightheadedness, dizziness, changes in speech, fever, chills, chest pain, SOB, MIRANDA, PND, cough, nausea, vomiting, diarrhea, melena, hematochezia, and LE edema.     VAD Interrogation on 1/12/2023: Denies alarms.     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    SOCIAL HISTORY:  Social History     Socioeconomic History     Marital status:    Tobacco Use     Smoking status: Former     Types: Cigarettes     Quit date: 1994     Years since quittin.8     Smokeless tobacco: Never   Substance and Sexual Activity     Alcohol use: Not Currently     Drug use: Never       CURRENT MEDICATIONS:  Outpatient Medications Prior to Visit   Medication Sig Dispense Refill     allopurinol (ZYLOPRIM) 100 MG tablet 2 tablets (200 mg) by Oral or Feeding Tube route daily 60 tablet 1     amiodarone (PACERONE) 200 MG tablet TAKE 1 TABLET BY MOUTH ONCE DAILY 90 tablet 3     amLODIPine (NORVASC) 5 MG tablet Take 2 tablets (10 mg) by mouth daily 180 tablet 3     aspirin (ASA) 81 MG EC tablet Take 1 tablet (81 mg) by mouth daily 90 tablet 3     B Complex-C-Folic Acid (RENAL) 1 MG CAPS Take 1 capsule by mouth daily 30 capsule 0     bumetanide (BUMEX) 2 MG tablet Take 3 tablets (6 mg) by mouth 2 times daily 540 tablet 3     co-enzyme Q-10 200 MG CAPS 200 mg by Oral or Feeding Tube route daily       dapagliflozin (FARXIGA) 5 MG TABS tablet Take 5 mg by mouth daily       ferrous sulfate (FEROSUL) 325 (65 Fe) MG tablet Take 325 mg by mouth daily (with breakfast)       finasteride (PROSCAR) 5 MG tablet Take 1 tablet (5 mg) by mouth daily Helps with urinary retention. 30 tablet 0     FLUoxetine (PROZAC) 10 MG capsule Take 30 mg by mouth daily       hydrALAZINE (APRESOLINE) 100 MG tablet Take 1 tablet (100 mg) by mouth 3 times daily 270 tablet 3     hydrochlorothiazide (HYDRODIURIL) 25 MG tablet Take 75 mg (3 tabs) every Wednesday & Saturday's 90 tablet 3     insulin pen needle (32G X 4 MM) 32G X 4 MM miscellaneous  (Patient not taking: Reported on 2022)       isosorbide mononitrate (IMDUR) 30 MG 24 hr tablet Take 3 tablets (90 mg)  by mouth daily 270 tablet 3     levothyroxine (SYNTHROID/LEVOTHROID) 75 MCG tablet Take 1 tablet (75 mcg) by mouth every morning (before breakfast) 30 tablet 1     magnesium oxide (MAG-OX) 400 MG tablet 1 tablet (400 mg) by Oral or Feeding Tube route 2 times daily 30 tablet 1     omeprazole (PRILOSEC) 20 MG DR capsule Take 20 mg by mouth daily       potassium chloride ER (KLOR-CON M) 20 MEQ CR tablet Take 80 mEq (4 tabs) in the AM and 80 mEq (4 tabs) in the PM. Take an additional 60 mEq on Wednesdays and Saturdays with HCTZ 720 tablet 3     senna-docusate (SENOKOT-S/PERICOLACE) 8.6-50 MG tablet Take 1 tablet by mouth 2 times daily as needed for constipation  (Patient not taking: Reported on 12/26/2022)       tamsulosin (FLOMAX) 0.4 MG capsule Take 2 capsules (0.8 mg) by mouth daily This med helps with urinary retention 30 capsule 0     warfarin ANTICOAGULANT (COUMADIN) 2 MG tablet Take 2.5 tablets (5 mg) by mouth daily       zolpidem (AMBIEN) 5 MG tablet Take 5 mg by mouth nightly as needed for Insomnia       No facility-administered medications prior to visit.       ROS:   CONSTITUTIONAL: Denies fever, chills, fatigue, or weight fluctuations.   HEENT: Denies headache, vision changes, and changes in speech.   CV: Refer to HPI.   PULMONARY:Denies shortness of breath, cough, or previous TB exposure.   GI:Denies nausea, vomiting, diarrhea, and abdominal pain. Bowel movements are regular.   :Denies urinary alterations, dysuria, urinary frequency, hematuria, and abnormal drainage.   EXT:Denies lower extremity edema.   SKIN:Denies abnormal rashes or lesions.   MUSCULOSKELETAL:Denies upper or lower extremity weakness and pain.   NEUROLOGIC:Denies lightheadedness, dizziness, seizures, or upper or lower extremity paresthesia.     EXAM:  MAP-92    The following Labs were reviewed today:  CBC RESULTS:  Lab Results   Component Value Date    WBC 7.5 12/20/2022    WBC 7.9 03/19/2021    RBC 3.16 (L) 12/20/2022    RBC 3.88  03/19/2021    HGB 9.2 (L) 12/20/2022    HGB 10.1 (A) 03/19/2021    HCT 29.8 (L) 12/20/2022    HCT 32.2 03/19/2021    MCV 94 12/20/2022    MCV 83.0 03/19/2021    MCH 29.1 12/20/2022    MCH 26.0 03/19/2021    MCHC 30.9 (L) 12/20/2022    MCHC 31.4 03/19/2021    RDW 15.5 (H) 12/20/2022    RDW 22.8 03/19/2021     12/20/2022     03/19/2021     03/17/2021       CMP RESULTS:  Lab Results   Component Value Date     12/20/2022     04/09/2021    POTASSIUM 4.0 12/20/2022    POTASSIUM 4.2 05/03/2022    POTASSIUM 4.2 04/09/2021    CHLORIDE 94 (L) 01/03/2023    CHLORIDE 103 04/09/2021    CO2 29 12/20/2022    CO2 26 05/03/2022    CO2 28 04/09/2021    ANIONGAP 9 12/20/2022    ANIONGAP 11 05/03/2022    ANIONGAP 8 04/09/2021     (H) 12/20/2022     (H) 05/03/2022     (A) 04/09/2021    BUN 25.6 (H) 12/20/2022    BUN 46 (H) 05/03/2022    BUN 35 04/09/2021    CR 1.35 (H) 12/20/2022    CR 1.6 04/09/2021    GFRESTIMATED 57 (L) 12/20/2022    GFRESTIMATED 34 03/19/2021    GFRESTBLACK 39 (L) 03/17/2021    CAOLS 8.5 (L) 12/20/2022    CALOS 9.0 04/09/2021    BILITOTAL 0.4 12/20/2022    BILITOTAL 0.5 03/19/2021    ALBUMIN 3.1 (L) 12/20/2022    ALBUMIN 3.7 05/03/2022    ALBUMIN 4.4 03/19/2021    ALKPHOS 285 (H) 12/20/2022    ALKPHOS 138.0 03/19/2021    ALT 81 (H) 12/20/2022    ALT 35 03/19/2021     (H) 12/20/2022    AST 60.0 03/19/2021        INR RESULTS:  Lab Results   Component Value Date    INR 1.4 (H) 01/03/2023    INR 1.7 (L) 11/23/2022    INR 3.1 (A) 07/09/2021       Lab Results   Component Value Date    MAG 1.9 12/20/2022    MAG 1.5 (L) 03/17/2021     Lab Results   Component Value Date    NTBNPI 3,432 (H) 12/10/2022    NTBNPI 27,781 (H) 02/15/2021     Lab Results   Component Value Date    NTBNP 4,416 (H) 02/10/2022       Assessment and Plan:   Eliseo Tanner is a 65 year old male with NICM (LVEF 15-20%), s/p HM III 2/18/20 (post op c/b retrosternal hematoma with bleeding in the  lungs), s/p ICD, h/o MSSA driveline infection and bacteremia 11/5/20 on prophylactic cephalexin, h/o VT on amiodarone, nonobstructive CAD, severe MR, atrial fibrillation on warfarin, HTN, ABHINAV requiring CPAP, CKD Stage IV, DM Type II, Hyperlipidemia, and history of HIT. He presents for hospital follow up via virtual visit at euvolemic state with stable transaminitis.     MRSA/MSSA Drive line infection s/p I & D.   - Continue wound vac per outside clinic.   - Continue Daptomycin.   - Appreciate management per ID. Discussed transaminitis with ID, continue to trend.   - continue weekly: CBC w/ diff, CMP, CK.     Chronic SCHF s/p HM III LVAD. Elevated LDH in setting of infection, resolved. Implanted 2/18/20.   Stage D, NYHA Class III  ACEi/ARB Lisinopril held in setting of renal failure. Hydralazine 100 mg po TID.   BB Deferred in the past due to   Aldosterone antagonist contraindicated due to renal dysfunction  SCD prophylaxis ICD  Fluid status euvolemic per history. Continue bumex 6 mg po BID. hydrochlorothiazide 75 mg po every Wednesday and Saturday.   MAP: unable to obtain at home. Renal appointment in AM.   LDH: Obtain with next set of labs.   Anticoagulation: Coumadin per AC. INR-1.8, Goal 2-3.   Antiplatelet: ASA 81 mg po daily   SGLT2I: Continue Farxiga 5 mg po daily.      Acute on Chronic CKD Stave IV secondary to shock. Cr peaked at 7.1. Last HD run 3/10. Tunneled line removed 3/16/21.  - Follows with Nephrology outpatient.   - Cr-1.7.     Wide Complex Tachycardia. History of Afib s/p DCCV and aflutter. Did not respond to Adenosine on admission. Underlying atrial flutter with 3:1. S/p CHAMP and DCCV, in SR currently.   - Continue Amiodarone.      Chronic microcytic/hypochromic anemia. Coagulopathy related to sepsis, resolved. IgG Kappa monoclonal Gammopathy of undetermined significance  - Hgb-11.  - follow up with [facility] and [facility] (Dr. Ibarra) as recommended.       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 Platelets 250K --> ~ 90K with documented history of exposure to unfractionate heparin products at OSH prior to his installation at the [facility]. HIT panel tests at that time with first negative and second antibody test was positive. No known thrombosis events. DAVINA antibody negative. Per Dr. James with hematology no other cause for isolated thrombocytopenia. Immediate recovery of plts following d/c of heparin. 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   - avoid heparin products.   - continue warfarin    HTN.   - Continue Norvasc and Hydralazine.     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 Transaminitis. Found to have elevated , , Alk Phos 330 inpatient. Likely predominantly cholestatic given R factor 1. Possibly related to congestion. CT with chloeithiasis without cholecysitits.   - Lipitor remains on hold.   - ALT and AST stable. Discussed Daptomycin involvement with ID. Continue to monitor weekly.      DM Type II, controlled. Hgb A1C 6.8 1/6/21.  - Management per PCP.   - Continue Farxiga.     Follow up VAD LISA in 2 weeks and ID as scheduled with weekly labs.     Laine Leon, JUAN CNP  1/12/2023    CC  BRANNON GAVIN

## 2023-01-13 NOTE — PROGRESS NOTES
ANTICOAGULATION MANAGEMENT     Eliseo Tanner 67 year old male is on warfarin with therapeutic INR result. (Goal INR 1.7-2.3)    Recent labs: (last 7 days)     01/12/23  0000   INR 1.8       ASSESSMENT       Source(s): Patient/Caregiver Call       Warfarin doses taken: Warfarin taken as instructed    Diet: No new diet changes identified    New illness, injury, or hospitalization: No    Medication/supplement changes: None noted    Signs or symptoms of bleeding or clotting: No    Previous INR: Subtherapeutic    Additional findings: None       PLAN     Recommended plan for no diet, medication or health factor changes affecting INR     Dosing Instructions: Continue your current warfarin dose with next INR in 2 weeks       Summary  As of 1/13/2023    Full warfarin instructions:  4 mg every day   Next INR check:  1/26/2023             Telephone call with Eliseo who verbalizes understanding and agrees to plan and who agrees to plan and repeated back plan correctly    Patient to recheck with home meter    Education provided:     None required    Plan made per [facility] anticoagulation protocol and per LVAD protocol    Luiza Perkins RN  Anticoagulation Clinic  1/13/2023    _______________________________________________________________________     Anticoagulation Episode Summary     Current INR goal:  1.7-2.3   TTR:  36.5 % (11.1 mo)   Target end date:  Indefinite   Send INR reminders to:  ANTICOAG LVAD    Indications    LVAD (left ventricular assist device) present (H) [Z95.811]  Chronic systolic congestive heart failure (H) [I50.22]  Paroxysmal atrial fibrillation (H) [I48.0]           Comments:  Follow VAD Anticoag protocol:Yes: HeartMate 3   Bridging: Call MD each time   Date VAD placed: 2/18/2020 5/6/22 [employer] [facility]         Anticoagulation Care Providers     Provider Role Specialty Phone number    Nader Cisneros MD Referring Cardiovascular Disease [phone]

## 2023-01-13 NOTE — PROGRESS NOTES
Eliseo is a 67 year old who is being evaluated via a billable video visit.      How would you like to obtain your AVS? MyChart  If the video visit is dropped, the invitation should be resent by: Text to cell phone: [phone]  Will anyone else be joining your video visit? Elizabeth Bhakta on 1/13/2023 at 1:21 PM      Video-Visit Details    Type of service:  Video Visit   Video Start Time: 1:27 PM  Video End Time:1:40pm    Originating Location (pt. Location): Home    Distant Location (provider location):  On-site  Platform used for Video Visit: Mary

## 2023-01-13 NOTE — LETTER
1/13/2023       RE: Eliseo Tanner  [address] [facility] [address]     Dear Colleague,    Thank you for referring your patient, Eliseo Tanner, to the [facility] INFECTIOUS DISEASE CLINIC [place] at [facility]. Please see a copy of my visit note below.    Eliseo is a 67 year old who is being evaluated via a billable video visit.      How would you like to obtain your AVS? MyChart  If the video visit is dropped, the invitation should be resent by: Text to cell phone: [phone]  Will anyone else be joining your video visit? No    Nataly Bhakta on 1/13/2023 at 1:21 PM    Video-Visit Details    Type of service:  Video Visit   Video Start Time: 1:27 PM  Video End Time:1:40pm    Originating Location (pt. Location): Home    Distant Location (provider location):  On-site  Platform used for Video Visit: [employer]  LVAD Infectious Disease Clinic Note     Patient:  Eliseo Tanner, Date of birth 1955, Medical record number [id number]  Date of Visit:  01/13/2023  Start time 1.27 to 1.40pm         Assessment and Recommendations:   ID Problem List  1. Chronic VAD driveline infection   1. Presented to OSH on 12/7 which grew MRSA and transferred to [facility] on 12/9/22  2. s/p I&D of fluid collection on 12/11/22; Cx +MSSA (tetra-R)  3. Was on PO suppression with Bactrim PTA pior but now on dapto since 12/  2. History of MSSA bacteremia 11/2020 secondary to MSSA driveline infection  3. History of pre-op Proteus and Enterococcus bacteremia  4. History of severe Legionella pneumonia (serogroup 1L) c/b cardiogenic shock, renal failure requiring CRRT, and resp failure requiring intubation, s/p azithromycin   5. CKD     RECOMMENDATION:  1. Continue Daptomycin at 6mg/kg until he has no more driange and wound is healed. Currently, per pt the drainage is improving (75% better per pt) and the wound is healing but not fully healed   2. Will need  weekly CK, CBC and CMP while on dapto  3. Still has wound vac, wound care is following.   4. F/u in ID clinic in 2-3 weeks. Will need to be seen before stopping abx.     Juan C Ayoub MD. 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 He recently completed course of IV cefazolin 10/21-11/18/22 for increased driveline drainage with MSSA (tetracycline R) on cultures. This improved but did not fully resolve drainage per patient. He was transitioned to PO Bactrim for suppression on 11/19, renally adjusted to 1 single strength daily, but had increased purulent drainage noted 1 week later. Previous suppressive regimens of cephalexin and cefadroxil with similar breakthrough drainage despite in vitro susceptibilities. He went to a local facility on 12/7/22 where cultures were obtained and grew MRSA and got transferred to [facility] for LVAD infection.     On admission here he got a CT C/A/P with LVAD driveline showing surrounding fluid along tract increased from prior, no organized fluid collection or significant inflammatory changes and trace ascites. He went to the OR on 12/11/22 for I&D. Cultures grew MSSA.  He was started empirically on micafungin x1, vancomycin, and zosyn, then narrowed to cefazolin monotherapy when cultures returned with MSSA (tetracycline resistant). He was subsequently broadened to dapto a few days later given the OSH cultures from 12/7/22 growing MRSA. A wound vac was placed and started on a initial 4 week IV abx therapy with dapto.     He is here for a ID virtual ID follow up today  (11/13/23). He has been on Dapto 6mg/kg since disharge and has been doing well. He has no complaints. He states that his drainage is better by about 75% and the wound is healing but not completely healed. He has no muscle pain, fevers, chills, sob, chest pain, abd pain, n/v/d or dysuria,        Transplants:  N/A; Postoperative day:  .  Coordinator Kay Padilla    Review of Systems:  CONSTITUTIONAL:  No fevers or chills. No night sweats.  EYES: negative for icterus or acute vision changes.   ENT:  negative for hearing loss, tinnitus or sore throat  RESPIRATORY:  negative for cough, sputum, dyspnea  CARDIOVASCULAR:  negative for chest pain, heart palpitations  GASTROINTESTINAL:  negative for nausea, vomiting, diarrhea or constipation  GENITOURINARY:  negative for dysuria or hematuria.  HEME:  No easy bruising or bleeding  INTEGUMENT:  negative for rash or pruritus  NEURO:  Negative for headache or tremor.    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)     MSSA     Legionella pneumonia (H)      LVAD (left ventricular assist device) present (H)      MSSA bacteremia 11/2020       Past Surgical History:   Procedure Laterality Date     ANESTHESIA CARDIOVERSION N/A 02/28/2020    Procedure: ANESTHESIA, FOR CARDIOVERSION;  Surgeon: GENERIC ANESTHESIA PROVIDER;  Location: [facility] OR     CV CARDIOMEMS WITH RIGHT HEART CATH N/A 09/20/2022    Procedure: Pulmonary Arterial Pressure Sensor Placement CPT Codes to be cleared by financial securing for this implant. [id number] and ;  Surgeon: Dalton Baeza MD;  Location:  HEART CARDIAC CATH LAB     CV CENTRAL VENOUS CATHETER PLACEMENT N/A 02/13/2020    Procedure: Central Venous Catheter Placement;  Surgeon: Chente Moss MD;  [facility]     CV  INTRA AORTIC BALLOON N/A 02/07/2020    Procedure: Intra-Aortic Balloon Pump Insertion;  Surgeon: Jose Baldwin MD;  Location:  HEART CARDIAC CATH LAB     CV INTRA AORTIC BALLOON N/A 02/13/2020    Procedure: Intra-Aortic Balloon Pump Insertion;  Surgeon: Chente Moss MD;  Location:  HEART CARDIAC CATH LAB     CV RIGHT HEART CATH MEASUREMENTS RECORDED N/A 09/21/2020    Procedure: CV RIGHT HEART CATH;  Surgeon: Dalton Baeza MD;  Location:  HEART CARDIAC CATH LAB     CV RIGHT HEART CATH MEASUREMENTS RECORDED N/A 01/07/2021    Procedure: Right Heart Cath;  Surgeon: Chun Ball MD;  Location:  HEART CARDIAC CATH LAB     CV RIGHT HEART CATH MEASUREMENTS RECORDED N/A 02/10/2022    Procedure: CV RIGHT HEART CATH;  Surgeon: Dalton Baeza MD;  Location:  HEART CARDIAC CATH LAB     CV RIGHT HEART CATH MEASUREMENTS RECORDED N/A 09/20/2022    Procedure: Right Heart Catheterization [[id number]];  Surgeon: Dalton Baeza MD;  Location:  HEART CARDIAC CATH LAB     CV RIGHT HEART CATH MEASUREMENTS RECORDED N/A 12/12/2022    Procedure: Right Heart Cath;  Surgeon: Chente Moss MD;  Location:  HEART CARDIAC CATH LAB     CV SWAN LUCIANA PROCEDURE N/A 02/13/2020    Procedure: [place] Luciana Procedure;  Surgeon: Chente Moss MD;  Location:  HEART CARDIAC CATH LAB     EP ICD Bilateral 03/16/2020    Procedure: EP ICD;  Surgeon: Dali Day MD;  Location:  HEART CARDIAC CATH LAB     INCISION AND DRAINAGE CHEST WASHOUT, COMBINED N/A 12/11/2022    Procedure: INCISION AND DRAINAGE OF DRIVELINE;  Surgeon: Mac Jaramillo MD;  Location: [facility] OR     INSERT VENTRICULAR ASSIST DEVICE LEFT (HEARTMATE II) N/A 02/18/2020    Procedure: INSERTION, LEFT VENTRICULAR ASSIST DEVICE (HEARTMATE III);  Surgeon: Mac Jaramillo MD;  Location: [facility] OR     IR CVC TUNNEL PLACEMENT > 5 YRS OF AGE  02/23/2021     IR CVC TUNNEL REMOVAL RIGHT  03/16/2021     MIDLINE  INSERTION - DOUBLE LUMEN Left 12/15/2022    left basilic 5 fr dl midline 20 cm     THORACOSCOPY Right 2020    Procedure: RIGHT VIDEO-ASSISTED THORASCOPIC SURGERY, EVACUATION OF HEMOTHORAX, PLACEMENT OF CHEST TUBES;  Surgeon: William Gan MD;  Location:  OR       No family history on file.    Social History     Social History Narrative     Not on file     Social History     Tobacco Use     Smoking status: Former     Types: Cigarettes     Quit date: 1994     Years since quittin.8     Smokeless tobacco: Never   Substance Use Topics     Alcohol use: Not Currently     Drug use: Never       Immunization History   Administered Date(s) Administered     COVID-19 Vaccine 12+ (Pfizer) 2021, 2021, 2021     COVID-19 Vaccine 18+ (Moderna) 2021     COVID-19 Vaccine Bivalent Booster 12+ (Pfizer) 10/26/2022     Flu, Unspecified 11/15/2019     Influenza (High Dose) 3 valent vaccine 10/25/2019     Pneumo Conj 13-V (2010&after) 2018       Patient Active Problem List   Diagnosis     Acute on chronic systolic congestive heart failure (H)     Anxiety     CKD (chronic kidney disease) stage 3, GFR 30-59 ml/min (H)     Coronary artery disease involving native coronary artery of native heart without angina pectoris     GERD (gastroesophageal reflux disease)     Gout     Hyperlipidemia with target LDL less than 100     Nonischemic cardiomyopathy (H)     Non-nephrotic range proteinuria     ABHINAV on CPAP     Type 2 diabetes mellitus with stage 3 chronic kidney disease, without long-term current use of insulin (H)     Heart failure (H)     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)     Cardiac arrest (H)     LVAD (left ventricular assist device) present (H)     Chronic systolic congestive heart failure (H)     CKD (chronic kidney disease) stage 4, GFR 15-29 ml/min (H)     Bacteremia     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)     Paroxysmal  atrial fibrillation (H)     Wound infection     ACC/AHA stage D heart failure (H)     Stage 3b chronic kidney disease (H)     Mixed hyperlipidemia     Benign essential hypertension     Dizziness     Syncope     Tachyarrhythmia     Acute kidney injury (BERNARDA) with acute tubular necrosis (ATN) (H)     Iron deficiency anemia, unspecified iron deficiency anemia type     Hypervolemia, unspecified hypervolemia type       No outpatient medications have been marked as taking for the 1/13/23 encounter (Virtual Visit) with [facility] LVAD.       Allergies   Allergen Reactions     Heparin      HIT screen positive 2/14/20, reflex DAVINA negative; however heme recommended treating as if positive  HIT screen negative 2/11/20     Oxycodone Itching and Other (See Comments)     Chlorhexidine Rash              Physical Exam:   Vitals were reviewed.  All vitals stable  There were no vitals taken for this visit.  Wt Readings from Last 4 Encounters:   12/20/22 79.9 kg (176 lb 1.6 oz)   10/18/22 89 kg (196 lb 3.4 oz)   10/18/22 89 kg (196 lb 1.6 oz)   09/20/22 86.8 kg (191 lb 4.8 oz)       Exam:  GENERAL: well-developed, well-nourished, alert, oriented, in no acute distress.  HEAD: Head is normocephalic, atraumatic   EYES: Eyes have anicteric sclerae.    NEUROLOGIC: Grossly nonfocal.         Laboratory Data:     No results found for: ACD4    Inflammatory Markers    Recent Labs   Lab Test 12/10/22  0616 12/08/22  1256 08/17/21  0807 02/16/21  2219 02/15/21  1910 02/11/21  0838 12/17/20  0756 12/14/20  0815   SED  --  49*  --  72* 47*  --   --   --    CRP 26.30* 31.50* 4.9 270.0* 270.0* 8.6 8.0 6.1       Immune Globulin Studies   No lab results found.    Metabolic Studies    Recent Labs   Lab Test 01/10/23  1006 12/27/22  1015 12/20/22  1135 12/20/22  0731 12/20/22  0408 12/19/22  0739 12/19/22  0458 12/18/22  0736 12/18/22  0559 12/17/22  1201 12/17/22  0656 09/11/22  1132 09/11/22  1044 02/18/22  0845 02/10/22  0730 02/24/21  1855 02/24/21  0442   NA   --   --   --   --  136  --  134*  --  129*  --  136   < > 140   < > 138   < > 132*   POTASSIUM  --   --   --   --  4.0  --  4.1  --  4.2  --  4.4   < > 4.9   < > 4.2   < > 4.1   CHLORIDE 96*   < >  --   --  98  --  99  --  94*  --  98   < > 100   < > 108   < > 99   CO2  --   --   --   --  29  --  27  --  29  --  30*   < > 29   < > 21   < > 25   ANIONGAP  --   --   --   --  9  --  8  --  6*  --  8   < > 11   < > 9   < > 8   BUN  --   --   --   --  25.6*  --  26.6*  --  28.8*  --  30.1*   < > 50.5*   < > 31*   < > 44*   CR  --   --   --   --  1.35*  --  1.35*  --  1.42*  --  1.52*   < > 1.73*   < > 1.29*   < > 3.84*   74842 1.70*   < >  --   --   --   --   --   --   --   --   --    < >  --    < >  --    < >  --    GFRESTIMATED  --   --   --   --  57*  --  57*  --  53*  --  49*   < > 42*   < > 60*   < > 15*   GLC  --   --  120*   < > 96   < > 96   < > 97   < > 108*   < > 181*   < > 131*   < > 125*   CALOS  --   --   --   --  8.5*  --  8.4*  --  8.1*  --  8.1*   < > 9.3   < > 8.4*   < > 8.0*   PHOS  --   --   --   --   --   --   --   --   --   --   --   --  3.7   < >  --   --   --    MAG  --   --   --   --  1.9   < > 2.0   < > 2.1   < > 2.2   < > 2.2   < >  --    < >  --    URIC  --   --   --   --   --   --   --   --   --   --   --   --   --   --  4.2  --   --    LACT  --   --   --   --   --   --   --   --   --   --   --   --   --   --   --   --  0.7   CKT  --   --   --   --   --   --   --   --   --   --  34*  --   --   --   --   --   --     < > = values in this interval not displayed.       Hepatic Studies    Recent Labs   Lab Test 12/20/22  0408 12/19/22  0458 12/18/22  0559 12/11/22  0657 12/10/22  1310 12/08/22  1256 10/18/22  1250 11/15/20  0830 11/14/20  2041   BILITOTAL 0.4 0.4 0.4   < >  --    < > 0.5   < > 0.7   DBIL  --   --   --   --   --   --   --   --  0.4*   ALKPHOS 285* 278* 277*   < >  --    < > 175*   < > 184*   PROTTOTAL 6.4 6.2* 6.3*   < >  --    < > 7.6   < > 7.8   ALBUMIN 3.1* 3.0* 3.0*   < >  --     < > 4.0   < > 2.6*   * 158* 149*   < >  --    < > 93*   < > 48*   ALT 81* 69* 51*   < >  --    < > 88*   < > 56   LDH  --   --   --   --  364*  --  283*   < >  --     < > = values in this interval not displayed.       Pancreatitis testing    Recent Labs   Lab Test 09/03/22  0630 02/08/20  0408   TRIG 53 57       Lipid testing    Recent Labs   Lab Test 09/03/22  0630 02/08/20  0408   CHOL 103 118   HDL 50 71   LDL 42 35   TRIG 53 57       Gout Labs      Recent Labs   Lab Test 02/10/22  0730 02/15/21  1910 02/08/20  0408   URIC 4.2 6.8 7.5*       Hematology Studies   Recent Labs   Lab Test 01/10/23  1006 12/27/22  1015 12/20/22  0408 12/19/22  0458 12/18/22  1744 12/18/22  0559 12/17/22  1638 12/17/22  0656 03/09/21  0510 03/07/21  0543 03/06/21  0430   WBC  --   --  7.5 8.0  --  7.9  --  7.9   < > 4.5 4.4   30467 8.7   < >  --   --   --   --   --   --    < >  --   --    ANEU  --   --   --   --   --   --   --   --   --  2.6 2.8   ANEUTAUTO  --   --  5.0 5.2  --  5.3  --  5.5   < >  --   --    ALYM  --   --   --   --   --   --   --   --   --  0.9 0.7*   ALYMPAUTO  --   --  1.1 1.2  --  1.1  --  1.0   < >  --   --    GIULIANO  --   --   --   --   --   --   --   --   --  0.7 0.6   AMONOAUTO  --   --  0.9 1.0  --  0.9  --  0.9   < >  --   --    AEOS  --   --   --   --   --   --   --   --   --  0.3 0.2   AEOSAUTO  --   --  0.3 0.3  --  0.3  --  0.3   < >  --   --    ABSBASO  --   --  0.1 0.1  --  0.1  --  0.1   < >  --   --    HGB  --   --  9.2* 9.3*   < > 9.3*   < > 9.4*   < > 7.9* 8.0*   64614 11.1*   < >  --   --   --   --   --   --    < >  --   --    HCT  --   --  29.8* 29.5*  --  30.5*  --  30.1*   < > 25.9* 25.3*   PLT  --   --  280 263  --  293  --  286   < > 208 196   55999 375   < >  --   --   --   --   --   --    < >  --   --     < > = values in this interval not displayed.       Clotting Studies    Recent Labs   Lab Test 01/12/23  0000 01/03/23  1015 12/27/22  0000 12/20/22  0408 03/16/21  0551 03/15/21  0551    INR 1.8 1.4* 2.5* 1.32*   < > 2.53*   PTT  --   --   --   --   --  37    < > = values in this interval not displayed.       Iron Testing    Recent Labs   Lab Test 12/20/22  0408 05/03/22  0943 02/10/22  0730 02/28/21  0517 02/27/21  0415 11/17/20  0549 11/16/20  0616 02/08/20  0932 02/08/20  0408   IRON  --   --  34*  --  28*  --  16*  --  25*   FEB  --   --  244  --  318  --  261  --  306   IRONSAT  --   --  14*  --  9*  --  6*  --  8*   HEAVENLY  --   --  124  --  276  --  75  --  189   MCV 94   < > 85   < > 78   < > 67*   < > 87   B12  --   --   --   --   --   --   --   --  752    < > = values in this interval not displayed.       Markers  No lab results found.    Invalid input(s): FETOPROTEIN, SERUM, AFP    Autoimmune Testing   No lab results found.    Invalid input(s): ANCAB, PANCA, CANCA    Arterial Blood Gas Testing    Recent Labs   Lab Test 09/03/22  0630 03/11/21  1330 02/25/21  0915 02/24/21  0442 02/22/21  1539 02/22/21  0353 02/21/21  0854 02/21/21  0335 02/20/21  1544 02/20/21  1303 02/20/21  1037 02/20/21  0912 02/20/21  0345   PH  --   --   --   --   --  7.46*  --  7.43  --  7.38 7.41  --  7.45   PCO2  --   --   --   --   --  34*  --  37  --  43 38  --  36   PO2  --   --   --   --   --  70*  --  63*  --  125* 107*  --  124*   HCO3  --   --   --   --   --  24  --  25  --  25 24  --  25   O2PER 0 21 21 21.0   < > 21.0  21.0   < > 21.0  21.0   < > 40 40   < > 40.0  40.0    < > = values in this interval not displayed.        Thyroid Studies     Recent Labs   Lab Test 12/08/22  1710 10/18/22  1250 09/03/22  0630 09/03/22  0630 01/05/21  1419 02/08/20  0408   TSH 6.11* 5.16*  --  8.06* 5.21* 1.72   T4 1.19 1.13   < > 1.22 1.04  --     < > = values in this interval not displayed.       Urine Studies     Recent Labs   Lab Test 02/16/21  0225 11/17/20  0825 02/22/20  0944 02/13/20  0334 02/07/20  2029   URINEPH 5.5 5.5 5.5 6.0 5.0   NITRITE Negative Negative Negative Negative Negative   LEUKEST Negative  Negative Small* Small* Negative   WBCU 0 0 2 81* 3       Medication levels    Recent Labs   Lab Test 12/10/22  1948 02/14/20  0331 02/13/20  2147   VANCOMYCIN 11.3   < >  --    TOBRA  --   --  13.0    < > = values in this interval not displayed.       CSF testing   No lab results found.    Invalid input(s): CADAM, EVPCR, ENTPCR, ENTEROVIRUS    Microbiology:  Fungal testing  No lab results found.    Invalid input(s): HIFUN, FUNGL    Beta D Glucan levels (Fungitell assay)    No results found for: FGTL, FGTLI     Last Culture results   Culture   Date Value Ref Range Status   12/11/2022 No anaerobic organisms isolated  Final   12/11/2022 No Growth  Final   12/11/2022 1+ Staphylococcus aureus (A)  Final     Comment:     Susceptibilities done on previous cultures   12/11/2022 1+ Staphylococcus aureus (A)  Final     Comment:     Susceptibilities done on previous cultures   12/08/2022 No Growth  Final   12/08/2022 2+ Staphylococcus aureus (A)  Final   12/08/2022 2+ Staphylococcus aureus (A)  Final   12/08/2022 No Growth  Final   10/18/2022 2+ Staphylococcus aureus (A)  Final   10/18/2022 2+ Staphylococcus aureus (A)  Final   09/20/2022 2+ Staphylococcus aureus (A)  Final   09/20/2022 1+ Normal freeman  Final   09/20/2022 No anaerobic organisms isolated  Final   02/10/2022 1+ Staphylococcus aureus (A)  Final   02/10/2022 1+ Cutibacterium (Propionibacterium) acnes (A)  Final     Comment:     Susceptibility testing of Cutibacterium (Propionibacterium) species is not done from this source. This organism is susceptible to penicillin and cefotaxime and most are susceptible to clindamycin.   08/17/2021 No Growth  Final   08/17/2021 No Growth  Final   08/17/2021 1+ Staphylococcus aureus (A)  Final   08/17/2021 No anaerobic organisms isolated  Final     Culture Micro   Date Value Ref Range Status   02/17/2021 No growth  Final   02/17/2021 No growth  Final   02/16/2021 Light growth  Enterococcus faecium   (A)  Final   02/16/2021 (A)   Final    Legionella pneumophila  isolated  Sent to [facility] for serotyping     02/16/2021   Final    Critical Value/Significant Value, preliminary result only, called to and read back by  Shobha Pedro RN on 2.23.21 at 1401. bw     02/16/2021 (A)  Final    Report received from the [place] [facility] of [facility]:  Legionella pneumophila serogroup 1  This test was developed and its performance characteristics determined by the [facility].  It has not been cleared or approved by the U.S. Food and Drug   Administration:21CFR 809.30(e).  The FDA has determined that such clearance is not   necessary.     02/15/2021 Moderate growth  Staphylococcus aureus   (A)  Final   02/15/2021 Moderate growth  Strain 2  Staphylococcus aureus   (A)  Final   02/15/2021 Moderate growth  Strain 3  Staphylococcus aureus   (A)  Final   02/15/2021 Light growth  Normal skin freeman    Final         Last checks of Clostridioides difficile testing  Recent Labs   Lab Test 11/15/20  1445   CDBPCT Negative       No components found for: AFBSTN    Syphilis Testing  Invalid input(s): [id number]    Tick Testing  No lab results found.    Invalid input(s): APHAGM    ASO Testing  Invalid input(s): [id number]    Quantiferon testing   Recent Labs   Lab Test 12/20/22  0408 12/19/22  0458 02/13/20  1030 02/12/20  0440 02/08/20  0408   TBRES  --   --   --  Negative Negative   LYMPH 15 15   < > 4.8 17.5    < > = values in this interval not displayed.       Infection Studies to assess Diarrhea  No lab results found.    Invalid input(s): NNDMRESULT    Virology:  Coronavirus-19 testing    Recent Labs   Lab Test 12/08/22  1641 09/10/22  0844 09/02/22  1749 02/07/22  1456 01/03/22  1011 08/17/21  1229 03/15/21  1612 03/09/21  1335 01/05/21  1521 11/14/20  2140   [id number]  --   --   --   --   --   --  Positive  --   --   --    [id number]  --   --   --   --   --   --  1:100  --   --   --    [id number] Negative Negative Negative  --   --   --   --  NEGATIVE   < > Test  received-See reflex to IDDL test SARS CoV2 (COVID-19) Virus RT-PCR  NEGATIVE   [id number]  --   --   --   --   --   --   --  Nasopharyngeal   < > Nasopharyngeal   [id number]  --   --   --   --   --   --   --   --   --  Nasopharyngeal   COVIDPCREXT  --   --   --  Detected*  Detected* POSITIVE*  POSITIVE*  --   --   --   --   --    [id number]  --   --   --   --   --  Negative  --   --   --   --    [id number]  --   --   --   --   --  <0.40  --   --   --   --     < > = values in this interval not displayed.       Respiratory virus (non-coronavirus-19) testing    Recent Labs   Lab Test 02/15/21  2058   IFLUA Not Detected   FLUAH1 Not Detected   GT6278 Not Detected   FLUAH3 Not Detected   IFLUB Not Detected   PIV1 Not Detected   PIV2 Not Detected   PIV3 Not Detected   PIV4 Not Detected   RSVA Not Detected   RSVB Not Detected   HMPV Not Detected   RHINEV Not Detected   ADENOV Not Detected   BUCHANAN Not Detected       CMV viral loads  No results found for: CMVQNT, CMVRESINST, CMVLOG, 05343, 41263, 80673, 09083    CMV resistance testing  No lab results found.  No results found for: CMVCID, CMVFOS, CMVGAN    No results found for: H6RES    No results found for: EBVDN, EBRES, EBVDN, EBVSP, EBVPC, EBVPCR    BK viral loads No lab results found.    Parvovirus Testing  No lab results found.    Invalid input(s): PRVRES    Adenovirus Testing  No lab results found.    Invalid input(s): ADENAB, ADENOVIRUS, ADQT    Hepatitis B Testing     Recent Labs   Lab Test 02/28/21  1155 02/12/20  0440 02/08/20  0408   AUSAB 0.00 0.69 1.99   HBCAB  --  Nonreactive Nonreactive   HEPBANG Nonreactive Nonreactive Nonreactive     Was the last Hepatitis B E antigen positive?   No results found for: HBEAGN     Hepatitis C Antibody   Date Value Ref Range Status   02/12/2020 Nonreactive NR^Nonreactive Final     Comment:     Assay performance characteristics have not been established for newborns,   infants, and children     02/08/2020 Nonreactive  NR^Nonreactive Final     Comment:     Assay performance characteristics have not been established for newborns,   infants, and children         CMV Antibody IgG   Date Value Ref Range Status   02/12/2020 >8.0 (H) 0.0 - 0.8 AI Final     Comment:     Positive  Antibody index (AI) values reflect qualitative changes in antibody   concentration that cannot be directly associated with clinical condition or   disease state.     02/08/2020 7.8 (H) 0.0 - 0.8 AI Final     Comment:     Positive  Antibody index (AI) values reflect qualitative changes in antibody   concentration that cannot be directly associated with clinical condition or   disease state.       Varicella Zoster Virus Antibody IgG   Date Value Ref Range Status   02/12/2020 2.0 (H) 0.0 - 0.8 AI Final     Comment:     Positive, suggests prev. exposure and probable immunity  Antibody index (AI) values reflect qualitative changes in antibody   concentration that cannot be directly associated with clinical condition or   disease state.     02/08/2020 2.4 (H) 0.0 - 0.8 AI Final     Comment:     Positive, suggests prev. exposure and probable immunity  Antibody index (AI) values reflect qualitative changes in antibody   concentration that cannot be directly associated with clinical condition or   disease state.       Toxoplasma Antibody IgG   Date Value Ref Range Status   02/12/2020 <3.0 0.0 - 7.1 IU/mL Final     Comment:     Negative- Absence of detectable Toxoplasma gondii IgG antibodies. A negative   result does not rule out acute infection.  The magnitude of the measured result is not indicative of the amount of   antibody present. The concentrations of anti-Toxoplasma gondii IgG in a given   specimen determined with assays from different manufacturers can vary due to   differences in assay methods and reagent specificity.     02/08/2020 <3.0 0.0 - 7.1 IU/mL Final     Comment:     Negative- Absence of detectable Toxoplasma gondii IgG antibodies. A negative   result  does not rule out acute infection.  The magnitude of the measured result is not indicative of the amount of   antibody present. The concentrations of anti-Toxoplasma gondii IgG in a given   specimen determined with assays from different manufacturers can vary due to   differences in assay methods and reagent specificity.       No results found for: H1IGG, H2IGG, EBVCAM    No components found for: [id number]    Last Pathology Report No results found for: CASEREPORT, CLININFO, FINALDX    Imaging:  Results for orders placed or performed during the hospital encounter of 12/08/22   US Abdomen Limited w Doppler Complete    Narrative    EXAMINATION: US ABDOMEN COMPLETE,  12/8/2022 6:47 PM     COMPARISON: 2/18/2021    HISTORY: Heart failure, concern for ascites.    TECHNIQUE: The right upper quadrant abdomen was scanned in standard  fashion with specialized ultrasound transducer(s) using both  gray-scale and limited color Doppler techniques.    FINDINGS:  Liver: Limited view of the left hepatic lobe due to overlying LVAD.  Visualized Liver demonstrates normal homogeneous echotexture. No  evidence of a focal hepatic mass. The main portal vein is patent with  antegrade flow, measuring 56 m/s. MPD measures 1.2 cm in diameter    Hepatic arteries are patent without elevated peak systolic velocities  or resistive indices.    Gallbladder: Gallbladder is decompressed with mild diffuse symmetric  wall thickening to 5 mm. There is a 4 mm avascular cholesterol polyp  and small amount of layering biliary sludge.    Bile Ducts: Visualized intrahepatic biliary system is normal caliber.    Pancreas: Visualized portions of the head and body of the pancreas are  unremarkable.     Right kidney: normal echotexture, without mass or hydronephrosis.    craniocaudal dimension: 9.9 cm    Aorta and IVC: The visualized portions of the aorta and IVC are  unremarkable.     Fluid: No evidence of ascites or pleural effusions.    Visualized aorta and IVC  are within normal limits.       Impression    IMPRESSION:   1.  Normal grayscale and Doppler evaluation of the liver, as well as  portal and hepatic vasculature.  2.  Decompressed gallbladder without evidence of acute cholecystitis.  No evidence of cholestatic disease process. There is a 3 mm  gallbladder cholesterol polyp which does not require follow-up.     I have personally reviewed the examination and initial interpretation  and I agree with the findings.    QUIQUE NICHOLS MD         SYSTEM ID:  [id number]   CT Chest Abdomen Pelvis w/o Contrast    Narrative    EXAMINATION: CT CHEST ABDOMEN PELVIS W/O CONTRAST, 12/8/2022 6:55 PM    INDICATION: here with suspected driveline infection; r/o empyema,  abscess    COMPARISON STUDY: CT CAP 2/16/2021  Exhibits  TECHNIQUE: CT scan of the chest, abdomen and pelvis was performed on  multidetector CT scanner using volumetric acquisition technique and  images were reconstructed in multiple planes with variable thickness  and reviewed on dedicated workstations.     CT scan radiation dose is optimized to minimum requisite dose using  automated dose modulation techniques.    FINDINGS:  Chest:   Mediastinum: Right chest wall implantable cardiac defibrillator with  leads in the right ventricle. Stable position of LVAD and right  anterior drive line. Small amount of fluid surrounding Drive line  within the right anterior chest subcutaneous tissues. Stable trace  fluid surrounding the LVAD outflow track in the anterior mediastinum.  No organized fluid collection.    Normal heart size. No pericardial effusion. Moderate to severe  coronary artery calcifications. Normal caliber of the aorta and main  pulmonary artery. Multiple prominent mediastinal lymph nodes. No  pathologically enlarged mediastinal, hilar or axillary lymph nodes.    Pleura: No pleural effusion or thickening.    Lungs: Central tracheobronchial tree is patent. Scattered sub-6 mm  pulmonary nodules. For example 2 mm  pulmonary nodule in the right  upper lobe (series 3 image 75). No suspicious pulmonary nodules. No  focal consolidation or mass.     Abdomen and Pelvis:  Liver: No mass. Mild intrahepatic biliary ductal dilation.    Biliary System: Cholelithiasis without evidence of cholecystitis. No  extrahepatic biliary ductal dilation.    Pancreas: Diffuse fatty atrophy of the pancreas. No mass or pancreatic  ductal dilation.    Adrenal glands: No mass or nodules    Spleen: Normal.    Kidneys: No suspicious mass, obstructing calculus or hydronephrosis.    Gastrointestinal tract: Normal appendix. Normal caliber large and  small bowel.  No evidence of obstruction.    Mesentery/peritoneum/retroperitoneum: Trace perihepatic ascites. No  mass or free air.    Lymph nodes: Enlarged left inguinal lymph nodes measuring up to 1.3  cm.    Vasculature: No infrarenal aortic aneurysm.    Pelvis: Posterior bladder wall diverticula. Otherwise bladder within  normal limits.    Osseous structures: No aggressive or acute osseous lesion.  Multilevel  degenerative changes of the visualized spine. Stable grade 1  anterolisthesis of L3 over L4 and L4 over L5.      Soft tissues: Fat containing left inguinal hernia.      Impression    IMPRESSION:   1. LVAD drive line with surrounding fluid in the along the tract in  the subcutaneous right anterior chest wall increased from previous.  Trace fluid surrounding the LVAD outflow tract , similar to prior. No  organized fluid collection or significant associated inflammatory  changes.  2. Left inguinal lymphadenopathy and stable prominent mediastinal  lymph nodes, likely reactive  3. Cholelithiasis without evidence of cholecystitis. Mild intrahepatic  biliary ductal dilation.  4. Trace ascites    I have personally reviewed the examination and initial interpretation  and I agree with the findings.    QUIQUE NICHOLS MD         SYSTEM ID:  [id number]   Echo Limited     Value    LVEF  10-15% (severely reduced)     [facility]    [id number]  [id number]  [id number]  [id number]^KATERINA^VERONICA     [facility],[place]  Echocardiography Laboratory  [address] [place] [id number]     Name: WOLFGANG LEACH  MRN: [id number]  : 1955  Study Date: 12/10/2022 07:54 AM  Age: 67 yrs  Gender: Male  [place] [facility] [place] Study: Heart Failure  Ordering Physician: VERONICA BORGES  Referring Physician: BRANNON GAVIN  Performed By: Beau Phan RDCS     BSA: 2.0 m2  Height: 67 in  Weight: 184 lb  HR: 61  BP: 87/79 mmHg  ______________________________________________________________________________  Procedure  Limited Portable Echo Adult. LVAD Echocardiogram with two-dimensional, color  and spectral Doppler performed.  ______________________________________________________________________________  Interpretation Summary  HM3 LVAD at 5800 rpm.  Left ventricular function is decreased. The ejection fraction is 10-15%  (severely reduced). Severe diffuse hypokinesis is present. The LVAD inflow  cannula is seen in the apex. It is not approximated to the septum. The  interventricular septum is deviated slightly towards the LV. The inflow  cannula velocities are somewhat low (40 cm/s) but outflow cannula velocities  are normal (120 cm/s).  Moderate right ventricular dilation is present. Global right ventricular  function is moderately reduced.  Mild to moderate mitral and tricuspid insufficiency are present.  The AV appears to open with each beat. There is mild-moderate aortic  regurgitation.  The estimated PA systolic pressure is at least 42 mmHg.  IVC diameter >2.1 cm collapsing <50% with sniff suggests a high RA pressure  estimated at 15 mmHg or greater.  This study was compared with the study from 2022. No significant changes  noted.  ______________________________________________________________________________  Left Ventricle  Left ventricular wall thickness is normal. Left ventricular size is  normal.  LVEDD 4.7 cm. Left ventricular function is decreased. The ejection fraction is  10-15% (severely reduced). Diastolic function not assessed due to presence of  LVAD. Severe diffuse hypokinesis is present. Abnormal septal motion consistent  with left bundle branch block is present. The LVAD inflow cannula is seen in  the apex. It is not approximated to the septum. The interventricular septum is  deviated slightly towards the LV. The inflow cannula velocities are somewhat  low (40 cm/s) but outflow cannula velocities are normal (120 cm/s).     Right Ventricle  Moderate right ventricular dilation is present. Global right ventricular  function is moderately reduced. A pacemaker lead is noted in the right  ventricle.     Mitral Valve  Mild to moderate mitral insufficiency is present.     Aortic Valve  The AV appears to open with each beat. There is mild-moderate aortic  regurgitation.     Tricuspid Valve  Mild to moderate tricuspid insufficiency is present. The right ventricular  systolic pressure is approximated at 26.6 mmHg plus the right atrial pressure.     Pulmonic Valve  Mild pulmonic insufficiency is present.     Vessels  The aorta root is normal. The thoracic aorta cannot be assessed. IVC diameter  >2.1 cm collapsing <50% with sniff suggests a high RA pressure estimated at 15  mmHg or greater.     Pericardium  No pericardial effusion is present.     Compared to Previous Study  This study was compared with the study from 2022 . No significant  changes noted.     ______________________________________________________________________________  MMode/2D Measurements & Calculations  IVSd: 0.96 cm  LVIDd: 4.9 cm  LVIDs: 4.9 cm  LVPWd: 0.82 cm  FS: 0.65 %  LV mass(C)d: 149.5 grams  LV mass(C)dI: 76.6 grams/m2     RWT: 0.33     Doppler Measurements & Calculations  TR max saumya: 257.8 cm/sec  TR max P.6 mmHg     ______________________________________________________________________________  Report approved  by: Graciela Torres 12/10/2022 02:28 PM         Cardiac Catheterization    Narrative      Right sided filling pressures are mildly elevated.    Mild elevated pulmonary hypertension.    Left sided filling pressures are normal.    Normal cardiac output level.    Hemodynamic data has been modified in Epic per physician review.      Cardiac Device Check - Inpatient     Value    Date Time Interrogation Session [id number]    Implantable Pulse Generator  Medtronic    Implantable Pulse Generator Model [id number] Visia AF MRI VR    Implantable Pulse Generator Serial Number [id number]    Type Interrogation Session In Clinic    Clinic Name [facility]    Implantable Pulse Generator Type Defibrillator    Implantable Pulse Generator Implant Date 20200316    Implantable Lead  Medtronic    Implantable Lead Model 6935M Sprint Quattro Secure S MRI SureScan    Implantable Lead Serial Number [id number]    Implantable Lead Implant Date 20200316    Implantable Lead Polarity Type Tripolar Lead    Implantable Lead Location Detail 1 UNKNOWN    Implantable Lead Special Function 62cm length    Implantable Lead Location Right Ventricle    Glenn Setting Mode (NBG Code) VVIR    Glenn Setting Lower Rate Limit 60    Glenn Setting Maximum Sensor Rate 115    Glenn Setting Hysterisis Rate DISABLED    Lead Channel Setting Sensing Polarity Bipolar    Lead Channel Setting Sensing Anode Location Right Ventricle    Lead Channel Setting Sensing Anode Terminal Ring    Lead Channel Setting Sensing Cathode Location Right Ventricle    Lead Channel Setting Sensing Cathode Terminal Tip    Lead Channel Setting Sensing Sensitivity 0.3    Lead Channel Setting Pacing Polarity Bipolar    Lead Channel Setting Pacing Anode Location Right Ventricle    Lead Channel Setting Pacing Anode Terminal Ring    Lead Channel Setting Sensing Cathode Location Right Ventricle    Lead Channel Setting Sensing Cathode Terminal Tip     Lead Channel Setting Pacing Pulse Width 0.4    Lead Channel Setting Pacing Amplitude 2    Lead Channel Setting Pacing Capture Mode Adaptive    Zone Setting Type Category VF    Zone Setting Detection Interval 270    Zone Setting Detection Beats Numerator 30    Zone Setting Detection Beats Denominator 40    Zone Setting Type Category VT    Zone Setting Detection Interval Blank    Zone Setting Type Category VT    Zone Setting Detection Interval 460    Zone Setting Type Category VT    Zone Setting Detection Interval 500    Zone Setting Type Category ATRIAL_FIBRILLATION    Zone Setting Type Category AT/AF    Lead Channel Impedance Value 418    Lead Channel Sensing Intrinsic Amplitude 14.0    Lead Channel Pacing Threshold Amplitude 1.0    Lead Channel Pacing Threshold Pulse Width 0.4    Battery Date Time of Measurements 20221209095259    Battery Status Middle of Service    Battery RRT Trigger 2.727    Battery Remaining Longevity 111    Battery Voltage 3.00    Capacitor Charge Type Reformation    Capacitor Last Charge Date Time 20221021180701    Capacitor Charge Time 3.743    Capacitor Charge Energy 18    Glenn Statistic Date Time Start 08996726971297    Glenn Statistic Date Time End 20221209095127    Glenn Statistic RV Percent Paced 92.43    Atrial Tachy Statistic Date Time Start 32035127901414    Atrial Tachy Statistic Date Time End 20221209095127    Atrial Tachy Statistic AT/AF [employer] Percent 0    Therapy Statistic Recent Shocks Delivered 0    Therapy Statistic Recent Shocks Aborted 0    Therapy Statistic Recent ATP Delivered 0    Therapy Statistic Recent Date Time Start 68450962553843    Therapy Statistic Recent Date Time End 20221209095127    Therapy Statistic Total Shocks Delivered 0    Therapy Statistic Total Shocks Aborted 0    Therapy Statistic Total ATP Delivered 0    Therapy Statistic Total  Date Time Start 79207975871734    Therapy Statistic Total  Date Time End 20221209095127    Episode Statistic Recent Count  0    Episode Statistic Type Category AT/AF    Episode Statistic Recent Count 0    Episode Statistic Type Category SVT    Episode Statistic Recent Count 0    Episode Statistic Type Category VT    Episode Statistic Recent Count 0    Episode Statistic Type Category VF    Episode Statistic Recent Count 0    Episode Statistic Type Category VT    Episode Statistic Recent Count 0    Episode Statistic Type Category VT    Episode Statistic Recent Count 0    Episode Statistic Type Category VT    Episode Statistic Recent Date Time Start 34510784018354    Episode Statistic Recent Date Time End 09929071479594    Episode Statistic Recent Date Time Start 14127300889869    Episode Statistic Recent Date Time End 05001905844070    Episode Statistic Recent Date Time Start 99210597955366    Episode Statistic Recent Date Time End 20480138832926    Episode Statistic Recent Date Time Start 22288930527478    Episode Statistic Recent Date Time End 95604748688512    Episode Statistic Recent Date Time Start 22038910904281    Episode Statistic Recent Date Time End 88230676850876    Episode Statistic Recent Date Time Start 61949186525683    Episode Statistic Recent Date Time End 81428795047454    Episode Statistic Recent Date Time Start 68480022630472    Episode Statistic Recent Date Time End 61803777398775    Episode Statistic Total Count 233    Episode Statistic Type Category AT/AF    Episode Statistic Total Count 0    Episode Statistic Type Category SVT    Episode Statistic Total Count 0    Episode Statistic Type Category VT    Episode Statistic Total Count 0    Episode Statistic Type Category VF    Episode Statistic Total Count 0    Episode Statistic Type Category VT    Episode Statistic Total Count 0    Episode Statistic Type Category VT    Episode Statistic Total Count 2    Episode Statistic Type Category VT    Episode Statistic Total Date Time Start 81830078859342    Episode Statistic Total Date Time End 28880969847994    Episode  Statistic Total Date Time Start 20200316145425    Episode Statistic Total Date Time End 20221209095127    Episode Statistic Total Date Time Start 20200316145425    Episode Statistic Total Date Time End 20221209095127    Episode Statistic Total Date Time Start 20200316145425    Episode Statistic Total Date Time End 20221209095127    Episode Statistic Total Date Time Start 20200316145425    Episode Statistic Total Date Time End 20221209095127    Episode Statistic Total Date Time Start 20200316145425    Episode Statistic Total Date Time End 20221209095127    Episode Statistic Total Date Time Start 20200316145425    Episode Statistic Total Date Time End 20221209095127    Narrative    Patient seen on [address] [place] [address] for evaluation and iterative programming of their   ICD per MD orders.     Device: [employer]ark [id number] Visia AF MRI VR  Normal device function   Mode: VVIR  bpm  : 92.4%  Intrinsic rhythm: VS @ 40-50s bpm  Thoracic Impedance: Near reference line.   Short V-V intervals: 0  Lead Trends Appear Stable.   Estimated battery longevity to RRT = 9.2 years. Battery voltage = 3.0 V.   Anticoagulant: warfarin  Setting Changes: None   Plan: Send a remote in 1 month, and RTC in March 2023 as scheduled.   RAMON Garcia RN    Single lead ICD    I have reviewed and interpreted the device interrogation, settings,   programming and nurse's summary. The device is functioning within normal   device parameters. I agree with the current findings, assessment and plan.     *Note: Due to a large number of results and/or encounters for the requested time period, some results have not been displayed. A complete set of results can be found in Results Review.

## 2023-01-16 NOTE — TELEPHONE ENCOUNTER
[facility] Call Center    Phone Message    May a detailed message be left on voicemail: yes     Reason for Call: Order(s): Other:   Reason for requested: to continue IV antibiotics and Lab  Date needed: as soon as possible  Provider name: Dr. Chaney  Please fax to [phone]    Action Taken: Message routed to:  [facility] infect, dis    Travel Screening: Not Applicable

## 2023-01-16 NOTE — TELEPHONE ENCOUNTER
Faxed orders to [facility] to continue IV abx's.     Sent request to scheduling for appt

## 2023-01-18 NOTE — PATIENT INSTRUCTIONS
Follow up with VAD LISA in 1 month.   Follow up with ID as scheduled  Continue labs as recommended per ID

## 2023-01-19 NOTE — PROGRESS NOTES
[facility] CORE Clinic - CardioMEMS Reading Cycle Review    January 17, 2023   CardioMEMS reviewed and routed to patient's provider, Laine Leon NP.   Current diuretic:  Bumex 6 mg BID.  Hydrochlorothiazide 75 mg every Wednesday and Saturday     Current potassium:  Potassium 80mEq BID w/ an extra 60 mEq Potassium on Wednesdays & Saturdays    Recent plan of care changes: No changes  Summary of CardioMEMS Cycle 12/19/2022 - 1/17/2023:    Reading Ranges:   PAS: 44-63  PAD: 17-28  PA Mean: 28-46  Heart Rate: 64-80    Current Threshold parameters:  21 - 24    Most Recent Waveform:       Readings:       RHC Date Wedge Pressure MEMS PAD during cath  (average of the 3 values)     Sept 20, 2022 w/MEMS implant     15 mmHg   16 mmHg

## 2023-01-20 NOTE — PROGRESS NOTES
Remote monitoring of Pulmonary Artery Pressures via CardioMEMS device reviewed at least weekly and as needed with nursing for 12/18 through 1/17/23. Diuretics adjusted accordingly.   Laine Leon, APRN CNP  1/20/2023

## 2023-01-20 NOTE — PROGRESS NOTES
"[facility] CORE Clinic - CardioMEMS Reading Review    January 20, 2023, 1002   CardioMems period: 1/18/23 - 2/16/2023      CardioMEMS reviewed and routed to patient's provider, Laine BARRY NP.     Current diuretic: Bumex 6 mg BID.  Hydrochlorothiazide 75 mg every Wednesday and Saturday      Current potassium chloride dose:  Potassium 80mEq BID w/ an extra 60 mEq Potassium on Wednesdays & Saturdays    Symptoms: Fatigue, \"no energy\"  Weights: Today, going back: 190, 188, 190, 188    Changes to plan of care today: After discussion with provider, will continue to trend    Current Threshold parameters: 21 - 24    Today's Waveform:       Readings:         RHC Date Wedge Pressure MEMS PAD during cath  (average of the 3 values)     Sept 20, 2022 w/MEMS implant     15 mmHg   16 mmHg         "

## 2023-01-25 NOTE — PROGRESS NOTES
[facility]ORE Clinic - CardioMEMS Reading Review    January 25, 2023, 1310   CardioMems period: 1/18/23 - 2/16/2023      CardioMEMS reviewed and routed to patient's provider, Laine BARRY NP.     Current diuretic: Bumex 6 mg BID.  Hydrochlorothiazide 75 mg every Wednesday and Saturday      Current potassium chloride dose:  Potassium 80mEq BID w/ an extra 60 mEq Potassium on Wednesdays & Saturdays    Symptoms: Pt feeling a bit tired, slight abdominal bloating  Weights: Today, going back: 193, 194, 190, 192, 188, 191     Changes to plan of care today: No change    Current Threshold parameters: 21 - 24    Today's Waveform:       Readings:         RHC Date Wedge Pressure MEMS PAD during cath  (average of the 3 values)     Sept 20, 2022 w/MEMS implant     15 mmHg   16 mmHg

## 2023-01-25 NOTE — TELEPHONE ENCOUNTER
[facility] Call Center    Phone Message    May a detailed message be left on voicemail: yes     Reason for Call: Medication Refill Request    Has the patient contacted the pharmacy for the refill? Yes   Name of medication being requested: levothyroxine (SYNTHROID/LEVOTHROID) 75 MCG tablet    Provider who prescribed the medication: Dr. Dutton  Pharmacy[place] [id number] [address]  Date medication is needed: ASAP - this is the 2nd request first one faxed from the pharmacy         Action Taken: Message routed to:  [facility] cardio    Travel Screening: Not Applicable     Thank you!  [facility]

## 2023-01-25 NOTE — TELEPHONE ENCOUNTER
[facility] Call Center    Phone Message    May a detailed message be left on voicemail: no     Reason for Call: Other: Sarah called from [facility] to request clarification if Laine Jacintohattieevelyne received a fax sent from [facility]. Please reach out to Sarah at [phone].      Action Taken: Message routed to:  [facility] ([facility] Cardiology    Travel Screening: Not Applicable

## 2023-01-25 NOTE — TELEPHONE ENCOUNTER
Called pharmacy to inform them that this refill needs to be directed to primary care MD, not cardiology.  Pharm tech verbalized understanding and will follow up with pt and primary care MD

## 2023-01-26 NOTE — TELEPHONE ENCOUNTER
[facility] Call Center    Phone Message    May a detailed message be left on voicemail: yes     Reason for Call: Other:     [facility] is requesting a call back,  She stated that is is urgent as the orders they have are .    Action Taken: Other: cardio    Travel Screening: Not Applicable     Thank you!  [facility]

## 2023-01-26 NOTE — PROGRESS NOTES
ANTICOAGULATION MANAGEMENT     Eliseo Tanner 67 year old male is on warfarin with supratherapeutic INR result. (Goal INR 1.7-2.3)    Recent labs: (last 7 days)     01/24/23  0000   INR 2.7*       ASSESSMENT       Source(s): Chart Review and Patient/Caregiver Call       Warfarin doses taken: Warfarin taken as instructed    Diet: No new diet changes identified    New illness, injury, or hospitalization: No    Medication/supplement changes: None noted    Signs or symptoms of bleeding or clotting: No    Previous INR: Therapeutic last visit; previously outside of goal range    Additional findings: Received result on 1/26/23.  Sent [facility] pool message to watch for results on 1/30/23.       PLAN     Recommended plan for no diet, medication or health factor changes affecting INR     Dosing Instructions: partial hold then continue your current warfarin dose with next INR in 4 days       Summary  As of 1/26/2023    Full warfarin instructions:  1/26: 2 mg; Otherwise 4 mg every day   Next INR check:  1/30/2023             Telephone call with Eliseo who agrees to plan and repeated back plan correctly    Patient using outside facility for labs    Education provided:     Importance of notifying anticoagulation clinic for: if you did not receive dosing instructions on the same day as your labs were checked    Plan made per [facility] anticoagulation protocol and per LVAD protocol    Young Bustos, RN  Anticoagulation Clinic  1/26/2023    _______________________________________________________________________     Anticoagulation Episode Summary     Current INR goal:  1.7-2.3   TTR:  37.4 % (11.1 mo)   Target end date:  Indefinite   Send INR reminders to:  ANTICOAG LVAD    Indications    LVAD (left ventricular assist device) present (H) [Z95.811]  Chronic systolic congestive heart failure (H) [I50.22]  Paroxysmal atrial fibrillation (H) [I48.0]           Comments:  Follow [facility] Anticoag protocol:Yes: HeartMate 3   Bridging: Call MD each time    Date VAD placed: 2/18/2020 5/6/22 [employer] [facility]         Anticoagulation Care Providers     Provider Role Specialty Phone number    Nader Cisneros MD Referring Cardiovascular Disease [phone]

## 2023-01-27 NOTE — TELEPHONE ENCOUNTER
[facility] called back.  Informed them that Edward is not the correct provider to contact for these orders.  Contact information for Dr. Jaramillo and the CVTS team given to [facility] rep.

## 2023-01-30 NOTE — PROGRESS NOTES
ANTICOAGULATION MANAGEMENT     Eliseo Tanner 67 year old male is on warfarin with therapeutic INR result. (Goal INR 1.7-2.3)    Recent labs: (last 7 days)     01/30/23  1058   INR 2.0*       ASSESSMENT       Source(s): Chart Review and Patient/Caregiver Call       Warfarin doses taken: Warfarin taken as instructed    Diet: No new diet changes identified    New illness, injury, or hospitalization: No    Medication/supplement changes: None noted    Signs or symptoms of bleeding or clotting: No    Previous INR: Supratherapeutic    Additional findings: None       PLAN     Recommended plan for no diet, medication or health factor changes affecting INR     Dosing Instructions: Continue your current warfarin dose with next INR in 2 weeks       Summary  As of 1/30/2023    Full warfarin instructions:  4 mg every day   Next INR check:  2/13/2023             Telephone call with Eliseo who verbalizes understanding and agrees to plan and who agrees to plan and repeated back plan correctly    Patient using outside facility for labs    Education provided:     None required    Plan made per [facility] anticoagulation protocol and per LVAD protocol    Young Bustos, RN  Anticoagulation Clinic  1/30/2023    _______________________________________________________________________     Anticoagulation Episode Summary     Current INR goal:  1.7-2.3   TTR:  38.0 % (11.2 mo)   Target end date:  Indefinite   Send INR reminders to:  ANTICOAG LVAD    Indications    LVAD (left ventricular assist device) present (H) [Z95.811]  Chronic systolic congestive heart failure (H) [I50.22]  Paroxysmal atrial fibrillation (H) [I48.0]           Comments:  Follow [facility] Anticoag protocol:Yes: HeartMate 3   Bridging: Call MD each time   Date VAD placed: 2/18/2020 5/6/22 [employer] [facility]         Anticoagulation Care Providers     Provider Role Specialty Phone number    Nader Cisneros MD Referring Cardiovascular Disease [phone]

## 2023-02-01 NOTE — PROGRESS NOTES
Eliseo is a 67 year old who is being evaluated via a billable video visit.      How would you like to obtain your AVS? MyChart  If the video visit is dropped, the invitation should be resent by: Text to cell phone: [phone]  Will anyone else be joining your video visit? No      Wife Jed will be joining today as well as nurse.     Nataly Bhakta on 2/1/2023 at 1:49 PM      Video-Visit Details    Type of service:  Video Visit   Video Start Time: 1:55  Video End Time:2:12    Originating Location (pt. Location): at wound care     Distant Location (provider location):  On-site  Platform used for Video Visit: Mary

## 2023-02-01 NOTE — LETTER
2/1/2023       RE: Eliseo Tanner  [address] [place] [address]     Dear Colleague,    Thank you for referring your patient, Eliseo Tanner, to the [facility] INFECTIOUS DISEASE [facility]IN[facility] [place] [facility]. Please see a copy of my visit note below.    [facility]  Transplant Infectious Disease Clinic Note:  Follow up      Patient:  Eliseo Tanner, Date of birth 1955, Medical record number [id number]  Date of Visit:  02/01/2023   Start time at 1:55-2:12         Assessment and Recommendations:   ID Problem List  1. Chronic VAD driveline infection   1. Presented to OSH on 12/7 which grew MRSA and transferred to [facility] on 12/9/22  2. s/p I&D of fluid collection on 12/11/22; Cx +MSSA (tetra-R)  3. Was on PO suppression with Bactrim PTA pior but now on dapto since 12/ 2. History of MSSA bacteremia 11/2020 secondary to MSSA driveline infection  3. History of pre-op Proteus and Enterococcus bacteremia  4. History of severe Legionella pneumonia (serogroup 1L) c/b cardiogenic shock, renal failure requiring CRRT, and resp failure requiring intubation, s/p azithromycin   5. CKD     RECOMMENDATION:  -Continue Daptomycin at 6mg/kg until he has no more driange and wound is healed. Currently, per pt the drainage is improving and the wound is healing but not fully healed. Therefore, unable to switch him to oral abx. Will continue Dapto for now for another 3-4 weeks.     -Will need weekly CK, CBC and CMP while on dapto  - LFTs are stable but if they were to increase further, will consider switching dapto to vanco.   -Still has wound vac, wound care is following.   -F/u in ID clinic in 3-4 weeks. Will need to be seen before stopping abx.    Juan C Ayoub MD. Pager [phone]         History of the Infectious Disease lllness:   Eliseo Tanner is a 67 year old male with past medical history significant for CHF from NICM s/p HM3 and ICD placement  (2/18/20), DM, CKD, chronic MSSA driveline infection on PO suppression, history of MSSA bacteremia (11/2020) 2/2 driveline infection, and pre-operative bacteremia (proteus, enterococcus) who was admitted 12/8/22 for increased driveline drainage and heart failure exacerbation. He recently completed course of IV cefazolin 10/21-11/18/22 for increased driveline drainage with MSSA (tetracycline R) on cultures. This improved but did not fully resolve drainage per patient. He was transitioned to PO Bactrim for suppression on 11/19, renally adjusted to 1 single strength daily, but had increased purulent drainage noted 1 week later. Previous suppressive regimens of cephalexin and cefadroxil with similar breakthrough drainage despite in vitro susceptibilities. He went to a local facility on 12/7/22 where cultures were obtained and grew MRSA and got transferred to [facility] for LVAD infection.      On admission here he got a CT C/A/P with LVAD driveline showing surrounding fluid along tract increased from prior, no organized fluid collection or significant inflammatory changes and trace ascites. He went to the OR on 12/11/22 for I&D. Cultures grew MSSA.  He was started empirically on micafungin x1, vancomycin, and zosyn, then narrowed to cefazolin monotherapy when cultures returned with MSSA (tetracycline resistant). He was subsequently broadened to dapto a few days later given the OSH cultures from 12/7/22 growing MRSA. A wound vac was placed and started on a initial 4 week IV abx therapy with dapto.      He had a virtual ID clinic visit on 11/13/23. He has been on Dapto 6mg/kg since disharge and has been doing well. He has no complaints. He states that his drainage is better by about 75% and the wound is healing but not completely healed. He has no muscle pain, fevers, chills, sob, chest pain, abd pain, n/v/d or dysuria,      He is here again for an ID virtual visit on 1/31/23. He was reached over the video during his wound vac  change. The wound is still has drainage but is getting better. The wound is closing up more as well.  Over video I could not appreciate any drainage. The wife and the wound nurse are happy with the progress. The wound measurement 7cm (Length)x 2cm (width)x.03cm (depth). He has no fevers, chills, sob, cough, n/v/d, muscle aches. His last CK on 1/24 is in normal range. No dysuria.             Transplants:  N/A; Postoperative day:  .  Coordinator Kay Padilla    Review of Systems:  CONSTITUTIONAL:  No fevers or chills. No night sweats.  EYES: negative for icterus or acute vision changes.   ENT:  negative for hearing loss, tinnitus or sore throat  RESPIRATORY:  negative for cough, sputum, dyspnea  CARDIOVASCULAR:  negative for chest pain, heart palpitations  GASTROINTESTINAL:  negative for nausea, vomiting, diarrhea or constipation  GENITOURINARY:  negative for dysuria or hematuria.  HEME:  No easy bruising or bleeding  INTEGUMENT:  negative for rash or pruritus  NEURO:  Negative for headache or tremor.    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)     MSSA     Legionella pneumonia (H)      LVAD (left ventricular assist device) present (H)      MSSA bacteremia 11/2020       Past Surgical History:   Procedure Laterality Date     ANESTHESIA CARDIOVERSION N/A 02/28/2020    Procedure: ANESTHESIA, FOR CARDIOVERSION;  Surgeon: GENERIC ANESTHESIA PROVIDER;  Location: [facility] OR     CV CARDIOMEMS WITH RIGHT HEART CATH N/A 09/20/2022    Procedure: Pulmonary Arterial Pressure Sensor Placement CPT Codes to be cleared by financial securing for this implant. [id number] and ;  Surgeon: Dalton Baeza MD;  Location: [facility] HEART CARDIAC CATH LAB     CV CENTRAL VENOUS CATHETER PLACEMENT N/A 02/13/2020    Procedure: Central Venous Catheter  Placement;  Surgeon: Chente Moss MD;  Location:  HEART CARDIAC CATH LAB     CV INTRA AORTIC BALLOON N/A 02/07/2020    Procedure: Intra-Aortic Balloon Pump Insertion;  Surgeon: Jose Baldwin MD;  Location:  HEART CARDIAC CATH LAB     CV INTRA AORTIC BALLOON N/A 02/13/2020    Procedure: Intra-Aortic Balloon Pump Insertion;  Surgeon: Chente Moss MD;  Location:  HEART CARDIAC CATH LAB     CV RIGHT HEART CATH MEASUREMENTS RECORDED N/A 09/21/2020    Procedure: CV RIGHT HEART CATH;  Surgeon: Dalton Baeza MD;  Location:  HEART CARDIAC CATH LAB     CV RIGHT HEART CATH MEASUREMENTS RECORDED N/A 01/07/2021    Procedure: Right Heart Cath;  Surgeon: Chun Ball MD;  Location:  HEART CARDIAC CATH LAB     CV RIGHT HEART CATH MEASUREMENTS RECORDED N/A 02/10/2022    Procedure: CV RIGHT HEART CATH;  Surgeon: Dalton Baeza MD;  Location:  HEART CARDIAC CATH LAB     CV RIGHT HEART CATH MEASUREMENTS RECORDED N/A 09/20/2022    Procedure: Right Heart Catheterization [[id number]];  Surgeon: Dalton Baeza MD;  Location:  HEART CARDIAC CATH LAB     CV RIGHT HEART CATH MEASUREMENTS RECORDED N/A 12/12/2022    Procedure: Right Heart Cath;  Surgeon: Chente Moss MD;  Location:  HEART CARDIAC CATH LAB     CV SWAN LUCIANA PROCEDURE N/A 02/13/2020    Procedure: Verona Luciana Procedure;  Surgeon: Chente Moss MD;  Location:  HEART CARDIAC CATH LAB     EP ICD Bilateral 03/16/2020    Procedure: EP ICD;  Surgeon: Dali Day MD;  Location:  HEART CARDIAC CATH LAB     INCISION AND DRAINAGE CHEST WASHOUT, COMBINED N/A 12/11/2022    Procedure: INCISION AND DRAINAGE OF DRIVELINE;  Surgeon: Mac Jaramillo MD;  Location:  OR     INSERT VENTRICULAR ASSIST DEVICE LEFT (HEARTMATE II) N/A 02/18/2020    Procedure: INSERTION, LEFT VENTRICULAR ASSIST DEVICE (HEARTMATE III);  Surgeon: Mac Jaramillo MD;  Location:  OR     IR CVC  TUNNEL PLACEMENT > 5 YRS OF AGE  2021     IR CVC TUNNEL REMOVAL RIGHT  2021     MIDLINE INSERTION - DOUBLE LUMEN Left 12/15/2022    left basilic 5 fr dl midline 20 cm     THORACOSCOPY Right 2020    Procedure: RIGHT VIDEO-ASSISTED THORASCOPIC SURGERY, EVACUATION OF HEMOTHORAX, PLACEMENT OF CHEST TUBES;  Surgeon: William Gan MD;  Location:  OR       No family history on file.    Social History     Social History Narrative     Not on file     Social History     Tobacco Use     Smoking status: Former     Types: Cigarettes     Quit date: 1994     Years since quittin.8     Smokeless tobacco: Never   Substance Use Topics     Alcohol use: Not Currently     Drug use: Never       Immunization History   Administered Date(s) Administered     COVID-19 Vaccine 12+ (Pfizer) 2021, 2021, 2021     COVID-19 Vaccine 18+ (Moderna) 2021     COVID-19 Vaccine Bivalent Booster 12+ (Pfizer) 10/26/2022     Flu, Unspecified 11/15/2019     Influenza (High Dose) 3 valent vaccine 10/25/2019     Pneumo Conj 13-V (2010&after) 2018       Patient Active Problem List   Diagnosis     Acute on chronic systolic congestive heart failure (H)     Anxiety     CKD (chronic kidney disease) stage 3, GFR 30-59 ml/min (H)     Coronary artery disease involving native coronary artery of native heart without angina pectoris     GERD (gastroesophageal reflux disease)     Gout     Hyperlipidemia with target LDL less than 100     Nonischemic cardiomyopathy (H)     Non-nephrotic range proteinuria     ABHINAV on CPAP     Type 2 diabetes mellitus with stage 3 chronic kidney disease, without long-term current use of insulin (H)     Heart failure (H)     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)     Cardiac arrest (H)     LVAD (left ventricular assist device) present (H)     Chronic systolic congestive heart failure (H)     CKD (chronic kidney disease) stage 4, GFR 15-29 ml/min (H)     Bacteremia      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)     Paroxysmal atrial fibrillation (H)     Wound infection     ACC/AHA stage D heart failure (H)     Stage 3b chronic kidney disease (H)     Mixed hyperlipidemia     Benign essential hypertension     Dizziness     Syncope     Tachyarrhythmia     Acute kidney injury (BERNARDA) with acute tubular necrosis (ATN) (H)     Iron deficiency anemia, unspecified iron deficiency anemia type     Hypervolemia, unspecified hypervolemia type       No outpatient medications have been marked as taking for the 2/1/23 encounter (Appointment) with Juan C Ayoub MD.       Allergies   Allergen Reactions     Heparin      HIT screen positive 2/14/20, reflex DAVINA negative; however heme recommended treating as if positive  HIT screen negative 2/11/20     Oxycodone Itching and Other (See Comments)     Chlorhexidine Rash              Physical Exam:   Vitals were reviewed.  All vitals stable  There were no vitals taken for this visit.  Wt Readings from Last 4 Encounters:   12/20/22 79.9 kg (176 lb 1.6 oz)   10/18/22 89 kg (196 lb 3.4 oz)   10/18/22 89 kg (196 lb 1.6 oz)   09/20/22 86.8 kg (191 lb 4.8 oz)       Exam:  GENERAL: well-developed, well-nourished, alert, oriented, in no acute distress.  HEAD: Head is normocephalic, atraumatic   EYES: Eyes have anicteric sclerae.    NEUROLOGIC: Grossly nonfocal.  abd wound: healing wound noted. No drainage noted. The wound measurement 7cm (Length)x 2cm (width)x.03cm (depth)           Laboratory Data:     No results found for: ACD4    Inflammatory Markers    Recent Labs   Lab Test 12/08/22  1256 08/17/21  0807 02/16/21  2219 02/15/21  1910 02/11/21  0838 12/17/20  0756 12/14/20  0815 11/05/20  0000 09/21/20  1438   SED 49*  --  72* 47*  --   --   --   --   --    CRP  --  4.9 270.0* 270.0* 8.6 8.0 6.1 54.9 6.6       Immune Globulin Studies   No lab results found.    Metabolic Studies    Recent Labs   Lab Test 01/30/23  0955  12/27/22  1015 12/20/22  1135 12/20/22  0731 12/20/22  0408 12/19/22  0739 12/19/22  0458 12/18/22  0736 12/18/22  0559 12/17/22  1201 12/17/22  0656 09/11/22  1132 09/11/22  1044 02/18/22  0845 02/10/22  0730 02/24/21  1855 02/24/21  0442   NA  --   --   --   --  136  --  134*  --  129*  --  136   < > 140   < > 138   < > 132*   POTASSIUM  --   --   --   --  4.0  --  4.1  --  4.2  --  4.4   < > 4.9   < > 4.2   < > 4.1   CHLORIDE 97*   < >  --   --  98  --  99  --  94*  --  98   < > 100   < > 108   < > 99   CO2  --   --   --   --  29  --  27  --  29  --  30*   < > 29   < > 21   < > 25   ANIONGAP  --   --   --   --  9  --  8  --  6*  --  8   < > 11   < > 9   < > 8   BUN  --   --   --   --  25.6*  --  26.6*  --  28.8*  --  30.1*   < > 50.5*   < > 31*   < > 44*   CR  --   --   --   --  1.35*  --  1.35*  --  1.42*  --  1.52*   < > 1.73*   < > 1.29*   < > 3.84*   27479 1.60*   < >  --   --   --   --   --   --   --   --   --    < >  --    < >  --    < >  --    GFRESTIMATED  --   --   --   --  57*  --  57*  --  53*  --  49*   < > 42*   < > 60*   < > 15*   GLC  --   --  120*   < > 96   < > 96   < > 97   < > 108*   < > 181*   < > 131*   < > 125*   CALOS  --   --   --   --  8.5*  --  8.4*  --  8.1*  --  8.1*   < > 9.3   < > 8.4*   < > 8.0*   PHOS  --   --   --   --   --   --   --   --   --   --   --   --  3.7   < >  --   --   --    MAG  --   --   --   --  1.9   < > 2.0   < > 2.1   < > 2.2   < > 2.2   < >  --    < >  --    URIC  --   --   --   --   --   --   --   --   --   --   --   --   --   --  4.2  --   --    LACT  --   --   --   --   --   --   --   --   --   --   --   --   --   --   --   --  0.7   CKT  --   --   --   --   --   --   --   --   --   --  34*  --   --   --   --   --   --     < > = values in this interval not displayed.       Hepatic Studies    Recent Labs   Lab Test 12/20/22  0408 12/19/22  0458 12/18/22  0559 12/11/22  0657 12/10/22  1310 12/08/22  1256 10/18/22  1250 11/15/20  0830 11/14/20  2041   BILITOTAL 0.4  0.4 0.4   < >  --    < > 0.5   < > 0.7   DBIL  --   --   --   --   --   --   --   --  0.4*   ALKPHOS 285* 278* 277*   < >  --    < > 175*   < > 184*   PROTTOTAL 6.4 6.2* 6.3*   < >  --    < > 7.6   < > 7.8   ALBUMIN 3.1* 3.0* 3.0*   < >  --    < > 4.0   < > 2.6*   * 158* 149*   < >  --    < > 93*   < > 48*   ALT 81* 69* 51*   < >  --    < > 88*   < > 56   LDH  --   --   --   --  364*  --  283*   < >  --     < > = values in this interval not displayed.       Pancreatitis testing    Recent Labs   Lab Test 09/03/22  0630 02/08/20  0408   TRIG 53 57       Lipid testing    Recent Labs   Lab Test 09/03/22  0630 02/08/20  0408   CHOL 103 118   HDL 50 71   LDL 42 35   TRIG 53 57       Gout Labs      Recent Labs   Lab Test 02/10/22  0730 02/15/21  1910 02/08/20  0408   URIC 4.2 6.8 7.5*       Hematology Studies   Recent Labs   Lab Test 01/24/23  1010 12/27/22  1015 12/20/22  0408 12/19/22  0458 12/18/22  1744 12/18/22  0559 12/17/22  1638 12/17/22  0656 03/09/21  0510 03/07/21  0543 03/06/21  0430   WBC  --   --  7.5 8.0  --  7.9  --  7.9   < > 4.5 4.4   52599 9.2   < >  --   --   --   --   --   --    < >  --   --    ANEU  --   --   --   --   --   --   --   --   --  2.6 2.8   ANEUTAUTO  --   --  5.0 5.2  --  5.3  --  5.5   < >  --   --    ALYM  --   --   --   --   --   --   --   --   --  0.9 0.7*   ALYMPAUTO  --   --  1.1 1.2  --  1.1  --  1.0   < >  --   --    GIULIANO  --   --   --   --   --   --   --   --   --  0.7 0.6   AMONOAUTO  --   --  0.9 1.0  --  0.9  --  0.9   < >  --   --    AEOS  --   --   --   --   --   --   --   --   --  0.3 0.2   AEOSAUTO  --   --  0.3 0.3  --  0.3  --  0.3   < >  --   --    ABSBASO  --   --  0.1 0.1  --  0.1  --  0.1   < >  --   --    HGB  --   --  9.2* 9.3*   < > 9.3*   < > 9.4*   < > 7.9* 8.0*   89295 11.6*   < >  --   --   --   --   --   --    < >  --   --    HCT  --   --  29.8* 29.5*  --  30.5*  --  30.1*   < > 25.9* 25.3*   PLT  --   --  280 263  --  293  --  286   < > 208 196    82766 358   < >  --   --   --   --   --   --    < >  --   --     < > = values in this interval not displayed.       Clotting Studies    Recent Labs   Lab Test 01/30/23  1058 01/24/23  0000 01/12/23  0000 01/03/23  1015 03/16/21  0551 03/15/21  0551   INR 2.0* 2.7* 1.8 1.4*   < > 2.53*   PTT  --   --   --   --   --  37    < > = values in this interval not displayed.       Iron Testing    Recent Labs   Lab Test 12/20/22  0408 05/03/22  0943 02/10/22  0730 02/28/21  0517 02/27/21  0415 11/17/20  0549 11/16/20  0616 02/08/20  0932 02/08/20  0408   IRON  --   --  34*  --  28*  --  16*  --  25*   FEB  --   --  244  --  318  --  261  --  306   IRONSAT  --   --  14*  --  9*  --  6*  --  8*   HEAVENLY  --   --  124  --  276  --  75  --  189   MCV 94   < > 85   < > 78   < > 67*   < > 87   B12  --   --   --   --   --   --   --   --  752    < > = values in this interval not displayed.       Markers  No lab results found.    Invalid input(s): FETOPROTEIN, SERUM, AFP    Autoimmune Testing   No lab results found.    Invalid input(s): ANCAB, PANCA, CANCA    Arterial Blood Gas Testing    Recent Labs   Lab Test 09/03/22  0630 03/11/21  1330 02/25/21  0915 02/24/21  0442 02/22/21  1539 02/22/21  0353 02/21/21  0854 02/21/21  0335 02/20/21  1544 02/20/21  1303 02/20/21  1037 02/20/21  0912 02/20/21  0345   PH  --   --   --   --   --  7.46*  --  7.43  --  7.38 7.41  --  7.45   PCO2  --   --   --   --   --  34*  --  37  --  43 38  --  36   PO2  --   --   --   --   --  70*  --  63*  --  125* 107*  --  124*   HCO3  --   --   --   --   --  24  --  25  --  25 24  --  25   O2PER 0 21 21 21.0   < > 21.0  21.0   < > 21.0  21.0   < > 40 40   < > 40.0  40.0    < > = values in this interval not displayed.        Thyroid Studies     Recent Labs   Lab Test 12/08/22  1710 10/18/22  1250 09/03/22  0630 09/03/22  0630 01/05/21  1419 02/08/20  0408   TSH 6.11* 5.16*  --  8.06* 5.21* 1.72   T4 1.19 1.13   < > 1.22 1.04  --     < > = values in this  interval not displayed.       Urine Studies     Recent Labs   Lab Test 02/16/21  0225 11/17/20  0825 02/22/20  0944 02/13/20  0334 02/07/20 2029   URINEPH 5.5 5.5 5.5 6.0 5.0   NITRITE Negative Negative Negative Negative Negative   LEUKEST Negative Negative Small* Small* Negative   WBCU 0 0 2 81* 3       Medication levels    Recent Labs   Lab Test 12/10/22  1948 02/14/20  0331 02/13/20  2147   VANCOMYCIN 11.3   < >  --    TOBRA  --   --  13.0    < > = values in this interval not displayed.       CSF testing   No lab results found.    Invalid input(s): CADAM, EVPCR, ENTPCR, ENTEROVIRUS    Microbiology:  Fungal testing  No lab results found.    Invalid input(s): HIFUN, FUNGL    Beta D Glucan levels (Fungitell assay)    No results found for: FGTL, FGTLI     Last Culture results   Culture   Date Value Ref Range Status   12/11/2022 No anaerobic organisms isolated  Final   12/11/2022 No Growth  Final   12/11/2022 1+ Staphylococcus aureus (A)  Final     Comment:     Susceptibilities done on previous cultures   12/11/2022 1+ Staphylococcus aureus (A)  Final     Comment:     Susceptibilities done on previous cultures   12/08/2022 No Growth  Final   12/08/2022 2+ Staphylococcus aureus (A)  Final   12/08/2022 2+ Staphylococcus aureus (A)  Final   12/08/2022 No Growth  Final   10/18/2022 2+ Staphylococcus aureus (A)  Final   10/18/2022 2+ Staphylococcus aureus (A)  Final   09/20/2022 2+ Staphylococcus aureus (A)  Final   09/20/2022 1+ Normal freeman  Final   09/20/2022 No anaerobic organisms isolated  Final   02/10/2022 1+ Staphylococcus aureus (A)  Final   02/10/2022 1+ Cutibacterium (Propionibacterium) acnes (A)  Final     Comment:     Susceptibility testing of Cutibacterium (Propionibacterium) species is not done from this source. This organism is susceptible to penicillin and cefotaxime and most are susceptible to clindamycin.   08/17/2021 No Growth  Final   08/17/2021 No Growth  Final   08/17/2021 1+ Staphylococcus aureus  (A)  Final   08/17/2021 No anaerobic organisms isolated  Final     Culture Micro   Date Value Ref Range Status   02/17/2021 No growth  Final   02/17/2021 No growth  Final   02/16/2021 Light growth  Enterococcus faecium   (A)  Final   02/16/2021 (A)  Final    Legionella pneumophila  isolated  Sent to [facility] for serotyping     02/16/2021   Final    Critical Value/Significant Value, preliminary result only, called to and read back by  Shobha Pedro RN on 2.23.21 at 1401. bw     02/16/2021 (A)  Final    Report received from the [place] [facility] of [facility]:  Legionella pneumophila serogroup 1  This test was developed and its performance characteristics determined by the [facility].  It has not been cleared or approved by the U.S. Food and Drug   Administration:21CFR 809.30(e).  The FDA has determined that such clearance is not   necessary.     02/15/2021 Moderate growth  Staphylococcus aureus   (A)  Final   02/15/2021 Moderate growth  Strain 2  Staphylococcus aureus   (A)  Final   02/15/2021 Moderate growth  Strain 3  Staphylococcus aureus   (A)  Final   02/15/2021 Light growth  Normal skin freeman    Final         Last checks of Clostridioides difficile testing  Recent Labs   Lab Test 11/15/20  1445   CDBPCT Negative       No components found for: AFBSTN    Syphilis Testing  Invalid input(s): [id number]    Tick Testing  No lab results found.    Invalid input(s): APHAGM    ASO Testing  Invalid input(s): [id number]    Quantiferon testing   Recent Labs   Lab Test 12/20/22  0408 12/19/22  0458 02/13/20  1030 02/12/20  0440 02/08/20  0408   TBRES  --   --   --  Negative Negative   LYMPH 15 15   < > 4.8 17.5    < > = values in this interval not displayed.       Infection Studies to assess Diarrhea  No lab results found.    Invalid input(s): NNDMRESULT    Virology:  Coronavirus-19 testing    Recent Labs   Lab Test 12/08/22  1641 09/10/22  0844 09/02/22  1749 02/07/22  1456 01/03/22  1011 08/17/21  1229 03/15/21  1612  03/09/21  1335 01/05/21  1521 11/14/20  2140   [id number]  --   --   --   --   --   --  Positive  --   --   --    [id number]  --   --   --   --   --   --  1:100  --   --   --    [id number] Negative Negative Negative  --   --   --   --  NEGATIVE   < > Test received-See reflex to IDDL test SARS CoV2 (COVID-19) Virus RT-PCR  NEGATIVE   [id number]  --   --   --   --   --   --   --  Nasopharyngeal   < > Nasopharyngeal   [id number]  --   --   --   --   --   --   --   --   --  Nasopharyngeal   COVIDPCREXT  --   --   --  Detected*  Detected* POSITIVE*  POSITIVE*  --   --   --   --   --    [id number]  --   --   --   --   --  Negative  --   --   --   --    [id number]  --   --   --   --   --  <0.40  --   --   --   --     < > = values in this interval not displayed.       Respiratory virus (non-coronavirus-19) testing    Recent Labs   Lab Test 02/15/21  2058   IFLUA Not Detected   FLUAH1 Not Detected   UN0277 Not Detected   FLUAH3 Not Detected   IFLUB Not Detected   PIV1 Not Detected   PIV2 Not Detected   PIV3 Not Detected   PIV4 Not Detected   RSVA Not Detected   RSVB Not Detected   HMPV Not Detected   RHINEV Not Detected   ADENOV Not Detected   BUCHANAN Not Detected       CMV viral loads  No results found for: CMVQNT, CMVRESINST, CMVLOG, 89682, 16882, 26421, 44091    CMV resistance testing  No lab results found.  No results found for: CMVCID, CMVFOS, CMVGAN    No results found for: H6RES    No results found for: EBVDN, EBRES, EBVDN, EBVSP, EBVPC, EBVPCR    BK viral loads No lab results found.    Parvovirus Testing  No lab results found.    Invalid input(s): PRVRES    Adenovirus Testing  No lab results found.    Invalid input(s): ADENAB, ADENOVIRUS, ADQT    Hepatitis B Testing     Recent Labs   Lab Test 02/28/21  1155 02/12/20  0440 02/08/20  0408   AUSAB 0.00 0.69 1.99   HBCAB  --  Nonreactive Nonreactive   HEPBANG Nonreactive Nonreactive Nonreactive     Was the last Hepatitis B E antigen positive?   No results found for:  HBEAGN     Hepatitis C Antibody   Date Value Ref Range Status   02/12/2020 Nonreactive NR^Nonreactive Final     Comment:     Assay performance characteristics have not been established for newborns,   infants, and children     02/08/2020 Nonreactive NR^Nonreactive Final     Comment:     Assay performance characteristics have not been established for newborns,   infants, and children         CMV Antibody IgG   Date Value Ref Range Status   02/12/2020 >8.0 (H) 0.0 - 0.8 AI Final     Comment:     Positive  Antibody index (AI) values reflect qualitative changes in antibody   concentration that cannot be directly associated with clinical condition or   disease state.     02/08/2020 7.8 (H) 0.0 - 0.8 AI Final     Comment:     Positive  Antibody index (AI) values reflect qualitative changes in antibody   concentration that cannot be directly associated with clinical condition or   disease state.       Varicella Zoster Virus Antibody IgG   Date Value Ref Range Status   02/12/2020 2.0 (H) 0.0 - 0.8 AI Final     Comment:     Positive, suggests prev. exposure and probable immunity  Antibody index (AI) values reflect qualitative changes in antibody   concentration that cannot be directly associated with clinical condition or   disease state.     02/08/2020 2.4 (H) 0.0 - 0.8 AI Final     Comment:     Positive, suggests prev. exposure and probable immunity  Antibody index (AI) values reflect qualitative changes in antibody   concentration that cannot be directly associated with clinical condition or   disease state.       Toxoplasma Antibody IgG   Date Value Ref Range Status   02/12/2020 <3.0 0.0 - 7.1 IU/mL Final     Comment:     Negative- Absence of detectable Toxoplasma gondii IgG antibodies. A negative   result does not rule out acute infection.  The magnitude of the measured result is not indicative of the amount of   antibody present. The concentrations of anti-Toxoplasma gondii IgG in a given   specimen determined with  assays from different manufacturers can vary due to   differences in assay methods and reagent specificity.     02/08/2020 <3.0 0.0 - 7.1 IU/mL Final     Comment:     Negative- Absence of detectable Toxoplasma gondii IgG antibodies. A negative   result does not rule out acute infection.  The magnitude of the measured result is not indicative of the amount of   antibody present. The concentrations of anti-Toxoplasma gondii IgG in a given   specimen determined with assays from different manufacturers can vary due to   differences in assay methods and reagent specificity.       No results found for: H1IGG, H2IGG, EBVCAM    No components found for: PBA6037    Last Pathology Report No results found for: CASEREPORT, CLININFO, FINALDX    Imaging:  Results for orders placed or performed in visit on 01/19/23   Cardiac Device Check - Remote     Value    Date Time Interrogation Session [id number]    Implantable Pulse Generator  [employer]    Implantable Pulse Generator Model [id number] Visia AF MRI VR    Implantable Pulse Generator Serial Number [id number]    Type Interrogation Session Remote    Clinic Name [facility] [place]    Implantable Pulse Generator Type Defibrillator    Implantable Pulse Generator Implant Date 20200316    Implantable Lead  [employer]tr[employer]    Implantable Lead Model 6935M [employer] Quattro Secure S MRI SureScan    Implantable Lead Serial Number [id number]    Implantable Lead Implant Date 20200316    Implantable Lead Polarity Type Tripolar Lead    Implantable Lead Location Detail 1 UNKNOWN    Implantable Lead Special Function 62cm length    Implantable Lead Location Right Ventricle    Glenn Setting Mode (NBG Code) VVIR    Glenn Setting Lower Rate Limit 60    Glenn Setting Maximum Sensor Rate 115    Glenn Setting Hysterisis Rate DISABLED    Lead Channel Setting Sensing Polarity Bipolar    Lead Channel Setting Sensing Anode Location Right Ventricle    Lead Channel Setting  Sensing Anode Terminal Ring    Lead Channel Setting Sensing Cathode Location Right Ventricle    Lead Channel Setting Sensing Cathode Terminal Tip    Lead Channel Setting Sensing Sensitivity 0.3    Lead Channel Setting Pacing Polarity Bipolar    Lead Channel Setting Pacing Anode Location Right Ventricle    Lead Channel Setting Pacing Anode Terminal Ring    Lead Channel Setting Sensing Cathode Location Right Ventricle    Lead Channel Setting Sensing Cathode Terminal Tip    Lead Channel Setting Pacing Pulse Width 0.4    Lead Channel Setting Pacing Amplitude 2    Lead Channel Setting Pacing Capture Mode Adaptive    Zone Setting Type Category VF    Zone Setting Detection Interval 270    Zone Setting Detection Beats Numerator 30    Zone Setting Detection Beats Denominator 40    Zone Setting Type Category VT    Zone Setting Detection Interval Blank    Zone Setting Type Category VT    Zone Setting Detection Interval 460    Zone Setting Type Category VT    Zone Setting Detection Interval 500    Zone Setting Type Category ATRIAL_FIBRILLATION    Zone Setting Type Category AT/AF    Lead Channel Impedance Value 399    Lead Channel Impedance Value 304    Lead Channel Sensing Intrinsic Amplitude 9.75    Lead Channel Pacing Threshold Amplitude 0.75    Lead Channel Pacing Threshold Pulse Width 0.4    Battery Date Time of Measurements [id number]    Battery Status Middle of Service    Battery RRT Trigger 2.727    Battery Remaining Longevity 108    Battery Voltage 2.99    Capacitor Charge Type Reformation    Capacitor Last Charge Date Time [id number]    Capacitor Charge Time 3.743    Capacitor Charge Energy 18    Glenn Statistic Date Time Start 01556009396140    Glenn Statistic Date Time End [id number]    Glenn Statistic RV Percent Paced 89.48    Atrial Tachy Statistic Date Time Start 06302995191752    Atrial Tachy Statistic Date Time End 23491388556170    Atrial Tachy Statistic AT/AF [place] Percent 0    Therapy Statistic  Recent Shocks Delivered 0    Therapy Statistic Recent Shocks Aborted 0    Therapy Statistic Recent ATP Delivered 0    Therapy Statistic Recent Date Time Start [id number]    Therapy Statistic Recent Date Time End 45535554445255    Therapy Statistic Total Shocks Delivered 0    Therapy Statistic Total Shocks Aborted 0    Therapy Statistic Total ATP Delivered 0    Therapy Statistic Total  Date Time Start 33079850683473    Therapy Statistic Total  Date Time End 61444738024826    Episode Statistic Recent Count 0    Episode Statistic Type Category AT/AF    Episode Statistic Recent Count 0    Episode Statistic Type Category SVT    Episode Statistic Recent Count 0    Episode Statistic Type Category VT    Episode Statistic Recent Count 0    Episode Statistic Type Category VF    Episode Statistic Recent Count 0    Episode Statistic Type Category VT    Episode Statistic Recent Count 0    Episode Statistic Type Category VT    Episode Statistic Recent Count 0    Episode Statistic Type Category VT    Episode Statistic Recent Date Time Start [id number]    Episode Statistic Recent Date Time End 12519311348495    Episode Statistic Recent Date Time Start 62960275337866    Episode Statistic Recent Date Time End 28057272200183    Episode Statistic Recent Date Time Start 97449173782532    Episode Statistic Recent Date Time End 06685154523632    Episode Statistic Recent Date Time Start 89809151403761    Episode Statistic Recent Date Time End 25041212590911    Episode Statistic Recent Date Time Start 94452881750706    Episode Statistic Recent Date Time End 96687590863931    Episode Statistic Recent Date Time Start 78191676869146    Episode Statistic Recent Date Time End 79069937129563    Episode Statistic Recent Date Time Start 63318459458551    Episode Statistic Recent Date Time End 97325171681124    Episode Statistic Total Count 233    Episode Statistic Type Category AT/AF    Episode Statistic Total Count 0    Episode Statistic  Type Category SVT    Episode Statistic Total Count 0    Episode Statistic Type Category VT    Episode Statistic Total Count 0    Episode Statistic Type Category VF    Episode Statistic Total Count 0    Episode Statistic Type Category VT    Episode Statistic Total Count 0    Episode Statistic Type Category VT    Episode Statistic Total Count 2    Episode Statistic Type Category VT    Episode Statistic Total Date Time Start 20200316145425    Episode Statistic Total Date Time End 20230119124121    Episode Statistic Total Date Time Start 20200316145425    Episode Statistic Total Date Time End 20230119124121    Episode Statistic Total Date Time Start 20200316145425    Episode Statistic Total Date Time End 20230119124121    Episode Statistic Total Date Time Start 20200316145425    Episode Statistic Total Date Time End 20230119124121    Episode Statistic Total Date Time Start 20200316145425    Episode Statistic Total Date Time End 20230119124121    Episode Statistic Total Date Time Start 20200316145425    Episode Statistic Total Date Time End 20230119124121    Episode Statistic Total Date Time Start 20200316145425    Episode Statistic Total Date Time End 20230119124121    Narrative    Remote ICD transmission received and reviewed. Device transmission sent   per MD orders.     Device: [employer] [id number] Visia AF MRI VR  Normal Device Function  Mode: VVIR  bpm  : 89.5%  Presenting EGM:  74 bpm  Thoracic Impedance:  Near reference line.   Short V-V intervals: 0  Lead Trends Appear Stable.  Estimated battery longevity to RRT = 7.9-10.1 years.  Battery voltage =   2.99 V.   Atrial Arrhythmia: None  AF [place]: 0%  Anticoagulant: Warfarin  Ventricular Arrhythmia: None  Pt Notified of Transmission Results: My Chart    Plan: RTC 3/23/2023  Candis Gaytan RN    Remote ICD Transmission    I have reviewed and interpreted the device interrogation, settings,   programming and nurse's summary. The device is functioning within  normal   device parameters. I agree with the current findings, assessment and plan.     *Note: Due to a large number of results and/or encounters for the requested time period, some results have not been displayed. A complete set of results can be found in Results Review.         Eliseo is a 67 year old who is being evaluated via a billable video visit.      How would you like to obtain your AVS? MyChart  If the video visit is dropped, the invitation should be resent by: Text to cell phone: [phone]  Will anyone else be joining your video visit? No      Wife Jed will be joining today as well as nurse.     Nataly Bhakta on 2/1/2023 at 1:49 PM      Video-Visit Details    Type of service:  Video Visit   Video Start Time: 1:55  Video End Time:2:12    Originating Location (pt. Location): at wound care     Distant Location (provider location):  On-site  Platform used for Video Visit: Mary LUGO MD

## 2023-02-01 NOTE — PROGRESS NOTES
[facility]  Transplant Infectious Disease Clinic Note:  Follow up      Patient:  Eliseo Tanner, Date of birth 1955, Medical record number [id number]  Date of Visit:  02/01/2023   Start time at 1:55-2:12         Assessment and Recommendations:   ID Problem List  1. Chronic VAD driveline infection   1. Presented to OSH on 12/7 which grew MRSA and transferred to N on 12/9/22  2. s/p I&D of fluid collection on 12/11/22; Cx +MSSA (tetra-R)  3. Was on PO suppression with Bactrim PTA pior but now on dapto since 12/ 2. History of MSSA bacteremia 11/2020 secondary to MSSA driveline infection  3. History of pre-op Proteus and Enterococcus bacteremia  4. History of severe Legionella pneumonia (serogroup 1L) c/b cardiogenic shock, renal failure requiring CRRT, and resp failure requiring intubation, s/p azithromycin   5. CKD     RECOMMENDATION:  -Continue Daptomycin at 6mg/kg until he has no more driange and wound is healed. Currently, per pt the drainage is improving and the wound is healing but not fully healed. Therefore, unable to switch him to oral abx. Will continue Dapto for now for another 3-4 weeks.     -Will need weekly CK, CBC and CMP while on dapto  - LFTs are stable but if they were to increase further, will consider switching dapto to vanco.   -Still has wound vac, wound care is following.   -F/u in ID clinic in 3-4 weeks. Will need to be seen before stopping abx.    Juan C Ayoub MD. Pager [phone]         History of the Infectious Disease lllness:   Eliseo Tanner is a 67 year old male with past medical history significant for CHF from NICM s/p HM3 and ICD placement (2/18/20), DM, CKD, chronic MSSA driveline infection on PO suppression, history of MSSA bacteremia (11/2020) 2/2 driveline infection, and pre-operative bacteremia (proteus, enterococcus) who was admitted 12/8/22 for increased driveline drainage and heart failure exacerbation. He recently completed course of IV  cefazolin 10/21-11/18/22 for increased driveline drainage with MSSA (tetracycline R) on cultures. This improved but did not fully resolve drainage per patient. He was transitioned to PO Bactrim for suppression on 11/19, renally adjusted to 1 single strength daily, but had increased purulent drainage noted 1 week later. Previous suppressive regimens of cephalexin and cefadroxil with similar breakthrough drainage despite in vitro susceptibilities. He went to a local facility on 12/7/22 where cultures were obtained and grew MRSA and got transferred to [facility] for LVAD infection.      On admission here he got a CT C/A/P with LVAD driveline showing surrounding fluid along tract increased from prior, no organized fluid collection or significant inflammatory changes and trace ascites. He went to the OR on 12/11/22 for I&D. Cultures grew MSSA.  He was started empirically on micafungin x1, vancomycin, and zosyn, then narrowed to cefazolin monotherapy when cultures returned with MSSA (tetracycline resistant). He was subsequently broadened to dapto a few days later given the OSH cultures from 12/7/22 growing MRSA. A wound vac was placed and started on a initial 4 week IV abx therapy with dapto.      He had a virtual ID clinic visit on 11/13/23. He has been on Dapto 6mg/kg since disharge and has been doing well. He has no complaints. He states that his drainage is better by about 75% and the wound is healing but not completely healed. He has no muscle pain, fevers, chills, sob, chest pain, abd pain, n/v/d or dysuria,      He is here again for an ID virtual visit on 1/31/23. He was reached over the video during his wound vac change. The wound is still has drainage but is getting better. The wound is closing up more as well.  Over video I could not appreciate any drainage. The wife and the wound nurse are happy with the progress. The wound measurement 7cm (Length)x 2cm (width)x.03cm (depth). He has no fevers, chills, sob, cough,  n/v/d, muscle aches. His last CK on 1/24 is in normal range. No dysuria.             Transplants:  N/A; Postoperative day:  .  Coordinator Kay Padilla    Review of Systems:  CONSTITUTIONAL:  No fevers or chills. No night sweats.  EYES: negative for icterus or acute vision changes.   ENT:  negative for hearing loss, tinnitus or sore throat  RESPIRATORY:  negative for cough, sputum, dyspnea  CARDIOVASCULAR:  negative for chest pain, heart palpitations  GASTROINTESTINAL:  negative for nausea, vomiting, diarrhea or constipation  GENITOURINARY:  negative for dysuria or hematuria.  HEME:  No easy bruising or bleeding  INTEGUMENT:  negative for rash or pruritus  NEURO:  Negative for headache or tremor.    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)     MSSA     Legionella pneumonia (H)      LVAD (left ventricular assist device) present (H)      MSSA bacteremia 11/2020       Past Surgical History:   Procedure Laterality Date     ANESTHESIA CARDIOVERSION N/A 02/28/2020    Procedure: ANESTHESIA, FOR CARDIOVERSION;  Surgeon: GENERIC ANESTHESIA PROVIDER;  Location: [facility] OR     CV CARDIOMEMS WITH RIGHT HEART CATH N/A 09/20/2022    Procedure: Pulmonary Arterial Pressure Sensor Placement CPT Codes to be cleared by financial securing for this implant. [id number] and ;  Surgeon: Dalton Baeza MD;  Location:  HEART CARDIAC CATH LAB     CV CENTRAL VENOUS CATHETER PLACEMENT N/A 02/13/2020    Procedure: Central Venous Catheter Placement;  Surgeon: Chente Moss MD;  Location:  HEART CARDIAC CATH LAB     CV INTRA AORTIC BALLOON N/A 02/07/2020    Procedure: Intra-Aortic Balloon Pump Insertion;  Surgeon: Jose Baldwin MD;  Location:  HEART CARDIAC CATH LAB     CV INTRA AORTIC BALLOON N/A 02/13/2020     Procedure: Intra-Aortic Balloon Pump Insertion;  Surgeon: Chente Moss MD;  Location:  HEART CARDIAC CATH LAB     CV RIGHT HEART CATH MEASUREMENTS RECORDED N/A 09/21/2020    Procedure: CV RIGHT HEART CATH;  Surgeon: Dalton Baeza MD;  Location:  HEART CARDIAC CATH LAB     CV RIGHT HEART CATH MEASUREMENTS RECORDED N/A 01/07/2021    Procedure: Right Heart Cath;  Surgeon: Chun Ball MD;  Location:  HEART CARDIAC CATH LAB     CV RIGHT HEART CATH MEASUREMENTS RECORDED N/A 02/10/2022    Procedure: CV RIGHT HEART CATH;  Surgeon: Dalton Baeza MD;  Location:  HEART CARDIAC CATH LAB     CV RIGHT HEART CATH MEASUREMENTS RECORDED N/A 09/20/2022    Procedure: Right Heart Catheterization [[id number]];  Surgeon: Dalton Baeza MD;  Location:  HEART CARDIAC CATH LAB     CV RIGHT HEART CATH MEASUREMENTS RECORDED N/A 12/12/2022    Procedure: Right Heart Cath;  Surgeon: Chente Moss MD;  Location:  HEART CARDIAC CATH LAB     CV SWAN LUCIANA PROCEDURE N/A 02/13/2020    Procedure: [place] Luciana Procedure;  Surgeon: Chente Moss MD;  Location:  HEART CARDIAC CATH LAB     EP ICD Bilateral 03/16/2020    Procedure: EP ICD;  Surgeon: Dali Day MD;  Location:  HEART CARDIAC CATH LAB     INCISION AND DRAINAGE CHEST WASHOUT, COMBINED N/A 12/11/2022    Procedure: INCISION AND DRAINAGE OF DRIVELINE;  Surgeon: Mac Jaramillo MD;  Location: [facility] OR     INSERT VENTRICULAR ASSIST DEVICE LEFT (HEARTMATE II) N/A 02/18/2020    Procedure: INSERTION, LEFT VENTRICULAR ASSIST DEVICE (HEARTMATE III);  Surgeon: Mac Jaramillo MD;  Location: [facility] OR     IR CVC TUNNEL PLACEMENT > 5 YRS OF AGE  02/23/2021     IR CVC TUNNEL REMOVAL RIGHT  03/16/2021     MIDLINE INSERTION - DOUBLE LUMEN Left 12/15/2022    left basilic 5 fr dl midline 20 cm     THORACOSCOPY Right 03/06/2020    Procedure: RIGHT VIDEO-ASSISTED THORASCOPIC SURGERY, EVACUATION OF HEMOTHORAX,  PLACEMENT OF CHEST TUBES;  Surgeon: William Gan MD;  Location: [facility] OR       No family history on file.    Social History     Social History Narrative     Not on file     Social History     Tobacco Use     Smoking status: Former     Types: Cigarettes     Quit date: 1994     Years since quittin.8     Smokeless tobacco: Never   Substance Use Topics     Alcohol use: Not Currently     Drug use: Never       Immunization History   Administered Date(s) Administered     COVID-19 Vaccine 12+ (Pfizer) 2021, 2021, 2021     COVID-19 Vaccine 18+ (Moderna) 2021     COVID-19 Vaccine Bivalent Booster 12+ (Pfizer) 10/26/2022     Flu, Unspecified 11/15/2019     Influenza (High Dose) 3 valent vaccine 10/25/2019     Pneumo Conj 13-V (2010&after) 2018       Patient Active Problem List   Diagnosis     Acute on chronic systolic congestive heart failure (H)     Anxiety     CKD (chronic kidney disease) stage 3, GFR 30-59 ml/min (H)     Coronary artery disease involving native coronary artery of native heart without angina pectoris     GERD (gastroesophageal reflux disease)     Gout     Hyperlipidemia with target LDL less than 100     Nonischemic cardiomyopathy (H)     Non-nephrotic range proteinuria     ABHINAV on CPAP     Type 2 diabetes mellitus with stage 3 chronic kidney disease, without long-term current use of insulin (H)     Heart failure (H)     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)     Cardiac arrest (H)     LVAD (left ventricular assist device) present (H)     Chronic systolic congestive heart failure (H)     CKD (chronic kidney disease) stage 4, GFR 15-29 ml/min (H)     Bacteremia     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)     Paroxysmal atrial fibrillation (H)     Wound infection     ACC/AHA stage D heart failure (H)     Stage 3b chronic kidney disease (H)     Mixed hyperlipidemia     Benign essential hypertension     Dizziness      Syncope     Tachyarrhythmia     Acute kidney injury (BERNARDA) with acute tubular necrosis (ATN) (H)     Iron deficiency anemia, unspecified iron deficiency anemia type     Hypervolemia, unspecified hypervolemia type       No outpatient medications have been marked as taking for the 2/1/23 encounter (Appointment) with Juan C Ayoub MD.       Allergies   Allergen Reactions     Heparin      HIT screen positive 2/14/20, reflex DAVINA negative; however heme recommended treating as if positive  HIT screen negative 2/11/20     Oxycodone Itching and Other (See Comments)     Chlorhexidine Rash              Physical Exam:   Vitals were reviewed.  All vitals stable  There were no vitals taken for this visit.  Wt Readings from Last 4 Encounters:   12/20/22 79.9 kg (176 lb 1.6 oz)   10/18/22 89 kg (196 lb 3.4 oz)   10/18/22 89 kg (196 lb 1.6 oz)   09/20/22 86.8 kg (191 lb 4.8 oz)       Exam:  GENERAL: well-developed, well-nourished, alert, oriented, in no acute distress.  HEAD: Head is normocephalic, atraumatic   EYES: Eyes have anicteric sclerae.    NEUROLOGIC: Grossly nonfocal.  abd wound: healing wound noted. No drainage noted. The wound measurement 7cm (Length)x 2cm (width)x.03cm (depth)           Laboratory Data:     No results found for: ACD4    Inflammatory Markers    Recent Labs   Lab Test 12/08/22  1256 08/17/21  0807 02/16/21  2219 02/15/21  1910 02/11/21  0838 12/17/20  0756 12/14/20  0815 11/05/20  0000 09/21/20  1438   SED 49*  --  72* 47*  --   --   --   --   --    CRP  --  4.9 270.0* 270.0* 8.6 8.0 6.1 54.9 6.6       Immune Globulin Studies   No lab results found.    Metabolic Studies    Recent Labs   Lab Test 01/30/23  0955 12/27/22  1015 12/20/22  1135 12/20/22  0731 12/20/22  0408 12/19/22  0739 12/19/22  0458 12/18/22  0736 12/18/22  0559 12/17/22  1201 12/17/22  0656 09/11/22  1132 09/11/22  1044 02/18/22  0845 02/10/22  0730 02/24/21  1855 02/24/21  0442   NA  --   --   --   --  136  --  134*  --  129*  --   136   < > 140   < > 138   < > 132*   POTASSIUM  --   --   --   --  4.0  --  4.1  --  4.2  --  4.4   < > 4.9   < > 4.2   < > 4.1   CHLORIDE 97*   < >  --   --  98  --  99  --  94*  --  98   < > 100   < > 108   < > 99   CO2  --   --   --   --  29  --  27  --  29  --  30*   < > 29   < > 21   < > 25   ANIONGAP  --   --   --   --  9  --  8  --  6*  --  8   < > 11   < > 9   < > 8   BUN  --   --   --   --  25.6*  --  26.6*  --  28.8*  --  30.1*   < > 50.5*   < > 31*   < > 44*   CR  --   --   --   --  1.35*  --  1.35*  --  1.42*  --  1.52*   < > 1.73*   < > 1.29*   < > 3.84*   65722 1.60*   < >  --   --   --   --   --   --   --   --   --    < >  --    < >  --    < >  --    GFRESTIMATED  --   --   --   --  57*  --  57*  --  53*  --  49*   < > 42*   < > 60*   < > 15*   GLC  --   --  120*   < > 96   < > 96   < > 97   < > 108*   < > 181*   < > 131*   < > 125*   CALOS  --   --   --   --  8.5*  --  8.4*  --  8.1*  --  8.1*   < > 9.3   < > 8.4*   < > 8.0*   PHOS  --   --   --   --   --   --   --   --   --   --   --   --  3.7   < >  --   --   --    MAG  --   --   --   --  1.9   < > 2.0   < > 2.1   < > 2.2   < > 2.2   < >  --    < >  --    URIC  --   --   --   --   --   --   --   --   --   --   --   --   --   --  4.2  --   --    LACT  --   --   --   --   --   --   --   --   --   --   --   --   --   --   --   --  0.7   CKT  --   --   --   --   --   --   --   --   --   --  34*  --   --   --   --   --   --     < > = values in this interval not displayed.       Hepatic Studies    Recent Labs   Lab Test 12/20/22  0408 12/19/22  0458 12/18/22  0559 12/11/22  0657 12/10/22  1310 12/08/22  1256 10/18/22  1250 11/15/20  0830 11/14/20  2041   BILITOTAL 0.4 0.4 0.4   < >  --    < > 0.5   < > 0.7   DBIL  --   --   --   --   --   --   --   --  0.4*   ALKPHOS 285* 278* 277*   < >  --    < > 175*   < > 184*   PROTTOTAL 6.4 6.2* 6.3*   < >  --    < > 7.6   < > 7.8   ALBUMIN 3.1* 3.0* 3.0*   < >  --    < > 4.0   < > 2.6*   * 158* 149*   < >  --     < > 93*   < > 48*   ALT 81* 69* 51*   < >  --    < > 88*   < > 56   LDH  --   --   --   --  364*  --  283*   < >  --     < > = values in this interval not displayed.       Pancreatitis testing    Recent Labs   Lab Test 09/03/22  0630 02/08/20  0408   TRIG 53 57       Lipid testing    Recent Labs   Lab Test 09/03/22  0630 02/08/20  0408   CHOL 103 118   HDL 50 71   LDL 42 35   TRIG 53 57       Gout Labs      Recent Labs   Lab Test 02/10/22  0730 02/15/21  1910 02/08/20  0408   URIC 4.2 6.8 7.5*       Hematology Studies   Recent Labs   Lab Test 01/24/23  1010 12/27/22  1015 12/20/22  0408 12/19/22  0458 12/18/22  1744 12/18/22  0559 12/17/22  1638 12/17/22  0656 03/09/21  0510 03/07/21  0543 03/06/21  0430   WBC  --   --  7.5 8.0  --  7.9  --  7.9   < > 4.5 4.4   85386 9.2   < >  --   --   --   --   --   --    < >  --   --    ANEU  --   --   --   --   --   --   --   --   --  2.6 2.8   ANEUTAUTO  --   --  5.0 5.2  --  5.3  --  5.5   < >  --   --    ALYM  --   --   --   --   --   --   --   --   --  0.9 0.7*   ALYMPAUTO  --   --  1.1 1.2  --  1.1  --  1.0   < >  --   --    GIULIANO  --   --   --   --   --   --   --   --   --  0.7 0.6   AMONOAUTO  --   --  0.9 1.0  --  0.9  --  0.9   < >  --   --    AEOS  --   --   --   --   --   --   --   --   --  0.3 0.2   AEOSAUTO  --   --  0.3 0.3  --  0.3  --  0.3   < >  --   --    ABSBASO  --   --  0.1 0.1  --  0.1  --  0.1   < >  --   --    HGB  --   --  9.2* 9.3*   < > 9.3*   < > 9.4*   < > 7.9* 8.0*   28802 11.6*   < >  --   --   --   --   --   --    < >  --   --    HCT  --   --  29.8* 29.5*  --  30.5*  --  30.1*   < > 25.9* 25.3*   PLT  --   --  280 263  --  293  --  286   < > 208 196   60499 358   < >  --   --   --   --   --   --    < >  --   --     < > = values in this interval not displayed.       Clotting Studies    Recent Labs   Lab Test 01/30/23  1058 01/24/23  0000 01/12/23  0000 01/03/23  1015 03/16/21  0551 03/15/21  0551   INR 2.0* 2.7* 1.8 1.4*   < > 2.53*   PTT  --    --   --   --   --  37    < > = values in this interval not displayed.       Iron Testing    Recent Labs   Lab Test 12/20/22  0408 05/03/22  0943 02/10/22  0730 02/28/21  0517 02/27/21  0415 11/17/20  0549 11/16/20  0616 02/08/20  0932 02/08/20  0408   IRON  --   --  34*  --  28*  --  16*  --  25*   FEB  --   --  244  --  318  --  261  --  306   IRONSAT  --   --  14*  --  9*  --  6*  --  8*   HEAVENLY  --   --  124  --  276  --  75  --  189   MCV 94   < > 85   < > 78   < > 67*   < > 87   B12  --   --   --   --   --   --   --   --  752    < > = values in this interval not displayed.       Markers  No lab results found.    Invalid input(s): FETOPROTEIN, SERUM, AFP    Autoimmune Testing   No lab results found.    Invalid input(s): ANCAB, PANCA, CANCA    Arterial Blood Gas Testing    Recent Labs   Lab Test 09/03/22  0630 03/11/21  1330 02/25/21  0915 02/24/21  0442 02/22/21  1539 02/22/21  0353 02/21/21  0854 02/21/21  0335 02/20/21  1544 02/20/21  1303 02/20/21  1037 02/20/21  0912 02/20/21  0345   PH  --   --   --   --   --  7.46*  --  7.43  --  7.38 7.41  --  7.45   PCO2  --   --   --   --   --  34*  --  37  --  43 38  --  36   PO2  --   --   --   --   --  70*  --  63*  --  125* 107*  --  124*   HCO3  --   --   --   --   --  24  --  25  --  25 24  --  25   O2PER 0 21 21 21.0   < > 21.0  21.0   < > 21.0  21.0   < > 40 40   < > 40.0  40.0    < > = values in this interval not displayed.        Thyroid Studies     Recent Labs   Lab Test 12/08/22  1710 10/18/22  1250 09/03/22  0630 09/03/22  0630 01/05/21  1419 02/08/20  0408   TSH 6.11* 5.16*  --  8.06* 5.21* 1.72   T4 1.19 1.13   < > 1.22 1.04  --     < > = values in this interval not displayed.       Urine Studies     Recent Labs   Lab Test 02/16/21  0225 11/17/20  0825 02/22/20  0944 02/13/20  0334 02/07/20  2029   URINEPH 5.5 5.5 5.5 6.0 5.0   NITRITE Negative Negative Negative Negative Negative   LEUKEST Negative Negative Small* Small* Negative   WBCU 0 0 2 81* 3        Medication levels    Recent Labs   Lab Test 12/10/22  1948 02/14/20  0331 02/13/20  2147   VANCOMYCIN 11.3   < >  --    TOBRA  --   --  13.0    < > = values in this interval not displayed.       CSF testing   No lab results found.    Invalid input(s): CADAM, EVPCR, ENTPCR, ENTEROVIRUS    Microbiology:  Fungal testing  No lab results found.    Invalid input(s): HIFUN, FUNGL    Beta D Glucan levels (Fungitell assay)    No results found for: FGTL, FGTLI     Last Culture results   Culture   Date Value Ref Range Status   12/11/2022 No anaerobic organisms isolated  Final   12/11/2022 No Growth  Final   12/11/2022 1+ Staphylococcus aureus (A)  Final     Comment:     Susceptibilities done on previous cultures   12/11/2022 1+ Staphylococcus aureus (A)  Final     Comment:     Susceptibilities done on previous cultures   12/08/2022 No Growth  Final   12/08/2022 2+ Staphylococcus aureus (A)  Final   12/08/2022 2+ Staphylococcus aureus (A)  Final   12/08/2022 No Growth  Final   10/18/2022 2+ Staphylococcus aureus (A)  Final   10/18/2022 2+ Staphylococcus aureus (A)  Final   09/20/2022 2+ Staphylococcus aureus (A)  Final   09/20/2022 1+ Normal freeman  Final   09/20/2022 No anaerobic organisms isolated  Final   02/10/2022 1+ Staphylococcus aureus (A)  Final   02/10/2022 1+ Cutibacterium (Propionibacterium) acnes (A)  Final     Comment:     Susceptibility testing of Cutibacterium (Propionibacterium) species is not done from this source. This organism is susceptible to penicillin and cefotaxime and most are susceptible to clindamycin.   08/17/2021 No Growth  Final   08/17/2021 No Growth  Final   08/17/2021 1+ Staphylococcus aureus (A)  Final   08/17/2021 No anaerobic organisms isolated  Final     Culture Micro   Date Value Ref Range Status   02/17/2021 No growth  Final   02/17/2021 No growth  Final   02/16/2021 Light growth  Enterococcus faecium   (A)  Final   02/16/2021 (A)  Final    Legionella pneumophila  isolated  Sent to  [facility] for serotyping     02/16/2021   Final    Critical Value/Significant Value, preliminary result only, called to and read back by  Shobha Pedro RN on 2.23.21 at 1401. bw     02/16/2021 (A)  Final    Report received from the [place] [facility] of [facility]:  Legionella pneumophila serogroup 1  This test was developed and its performance characteristics determined by the [facility].  It has not been cleared or approved by the U.S. Food and Drug   Administration:21CFR 809.30(e).  The FDA has determined that such clearance is not   necessary.     02/15/2021 Moderate growth  Staphylococcus aureus   (A)  Final   02/15/2021 Moderate growth  Strain 2  Staphylococcus aureus   (A)  Final   02/15/2021 Moderate growth  Strain 3  Staphylococcus aureus   (A)  Final   02/15/2021 Light growth  Normal skin freeman    Final         Last checks of Clostridioides difficile testing  Recent Labs   Lab Test 11/15/20  1445   CDBPCT Negative       No components found for: AFBSTN    Syphilis Testing  Invalid input(s): PXC3020    Tick Testing  No lab results found.    Invalid input(s): APHAGM    ASO Testing  Invalid input(s): HRI1200    Quantiferon testing   Recent Labs   Lab Test 12/20/22  0408 12/19/22  0458 02/13/20  1030 02/12/20  0440 02/08/20  0408   TBRES  --   --   --  Negative Negative   LYMPH 15 15   < > 4.8 17.5    < > = values in this interval not displayed.       Infection Studies to assess Diarrhea  No lab results found.    Invalid input(s): NNDMRESULT    Virology:  Coronavirus-19 testing    Recent Labs   Lab Test 12/08/22  1641 09/10/22  0844 09/02/22  1749 02/07/22  1456 01/03/22  1011 08/17/21  1229 03/15/21  1612 03/09/21  1335 01/05/21  1521 11/14/20  2140   [id number]  --   --   --   --   --   --  Positive  --   --   --    [id number]  --   --   --   --   --   --  1:100  --   --   --    [id number] Negative Negative Negative  --   --   --   --  NEGATIVE   < > Test received-See reflex to IDDL test SARS CoV2  (COVID-19) Virus RT-PCR  NEGATIVE   [id number]  --   --   --   --   --   --   --  Nasopharyngeal   < > Nasopharyngeal   [id number]  --   --   --   --   --   --   --   --   --  Nasopharyngeal   COVIDPCREXT  --   --   --  Detected*  Detected* POSITIVE*  POSITIVE*  --   --   --   --   --    [id number]  --   --   --   --   --  Negative  --   --   --   --    [id number]  --   --   --   --   --  <0.40  --   --   --   --     < > = values in this interval not displayed.       Respiratory virus (non-coronavirus-19) testing    Recent Labs   Lab Test 02/15/21  2058   IFLUA Not Detected   FLUAH1 Not Detected   SE9365 Not Detected   FLUAH3 Not Detected   IFLUB Not Detected   PIV1 Not Detected   PIV2 Not Detected   PIV3 Not Detected   PIV4 Not Detected   RSVA Not Detected   RSVB Not Detected   HMPV Not Detected   RHINEV Not Detected   ADENOV Not Detected   BUCHANAN Not Detected       CMV viral loads  No results found for: CMVQNT, CMVRESINST, CMVLOG, 11115, 87442, 29399, 03309    CMV resistance testing  No lab results found.  No results found for: CMVCID, CMVFOS, CMVGAN    No results found for: H6RES    No results found for: EBVDN, EBRES, EBVDN, EBVSP, EBVPC, EBVPCR    BK viral loads No lab results found.    Parvovirus Testing  No lab results found.    Invalid input(s): PRVRES    Adenovirus Testing  No lab results found.    Invalid input(s): ADENAB, ADENOVIRUS, ADQT    Hepatitis B Testing     Recent Labs   Lab Test 02/28/21  1155 02/12/20  0440 02/08/20  0408   AUSAB 0.00 0.69 1.99   HBCAB  --  Nonreactive Nonreactive   HEPBANG Nonreactive Nonreactive Nonreactive     Was the last Hepatitis B E antigen positive?   No results found for: HBEAGN     Hepatitis C Antibody   Date Value Ref Range Status   02/12/2020 Nonreactive NR^Nonreactive Final     Comment:     Assay performance characteristics have not been established for newborns,   infants, and children     02/08/2020 Nonreactive NR^Nonreactive Final     Comment:     Assay  performance characteristics have not been established for newborns,   infants, and children         CMV Antibody IgG   Date Value Ref Range Status   02/12/2020 >8.0 (H) 0.0 - 0.8 AI Final     Comment:     Positive  Antibody index (AI) values reflect qualitative changes in antibody   concentration that cannot be directly associated with clinical condition or   disease state.     02/08/2020 7.8 (H) 0.0 - 0.8 AI Final     Comment:     Positive  Antibody index (AI) values reflect qualitative changes in antibody   concentration that cannot be directly associated with clinical condition or   disease state.       Varicella Zoster Virus Antibody IgG   Date Value Ref Range Status   02/12/2020 2.0 (H) 0.0 - 0.8 AI Final     Comment:     Positive, suggests prev. exposure and probable immunity  Antibody index (AI) values reflect qualitative changes in antibody   concentration that cannot be directly associated with clinical condition or   disease state.     02/08/2020 2.4 (H) 0.0 - 0.8 AI Final     Comment:     Positive, suggests prev. exposure and probable immunity  Antibody index (AI) values reflect qualitative changes in antibody   concentration that cannot be directly associated with clinical condition or   disease state.       Toxoplasma Antibody IgG   Date Value Ref Range Status   02/12/2020 <3.0 0.0 - 7.1 IU/mL Final     Comment:     Negative- Absence of detectable Toxoplasma gondii IgG antibodies. A negative   result does not rule out acute infection.  The magnitude of the measured result is not indicative of the amount of   antibody present. The concentrations of anti-Toxoplasma gondii IgG in a given   specimen determined with assays from different manufacturers can vary due to   differences in assay methods and reagent specificity.     02/08/2020 <3.0 0.0 - 7.1 IU/mL Final     Comment:     Negative- Absence of detectable Toxoplasma gondii IgG antibodies. A negative   result does not rule out acute infection.  The  magnitude of the measured result is not indicative of the amount of   antibody present. The concentrations of anti-Toxoplasma gondii IgG in a given   specimen determined with assays from different manufacturers can vary due to   differences in assay methods and reagent specificity.       No results found for: H1IGG, H2IGG, EBVCAM    No components found for: SNC5202    Last Pathology Report No results found for: CASEREPORT, CLININFO, FINALDX    Imaging:  Results for orders placed or performed in visit on 01/19/23   Cardiac Device Check - Remote     Value    Date Time Interrogation Session [id number]    Implantable Pulse Generator  Medtronic    Implantable Pulse Generator Model [id number] Visia AF MRI VR    Implantable Pulse Generator Serial Number [id number]    Type Interrogation Session Remote    Clinic Name [facility]    Implantable Pulse Generator Type Defibrillator    Implantable Pulse Generator Implant Date 20200316    Implantable Lead  Medtronic    Implantable Lead Model 6935M Sprint Quattro Secure S MRI SureScan    Implantable Lead Serial Number [id number]    Implantable Lead Implant Date 20200316    Implantable Lead Polarity Type Tripolar Lead    Implantable Lead Location Detail 1 UNKNOWN    Implantable Lead Special Function 62cm length    Implantable Lead Location Right Ventricle    Glenn Setting Mode (NBG Code) VVIR    Glenn Setting Lower Rate Limit 60    Glenn Setting Maximum Sensor Rate 115    Glenn Setting Hysterisis Rate DISABLED    Lead Channel Setting Sensing Polarity Bipolar    Lead Channel Setting Sensing Anode Location Right Ventricle    Lead Channel Setting Sensing Anode Terminal Ring    Lead Channel Setting Sensing Cathode Location Right Ventricle    Lead Channel Setting Sensing Cathode Terminal Tip    Lead Channel Setting Sensing Sensitivity 0.3    Lead Channel Setting Pacing Polarity Bipolar    Lead Channel Setting Pacing Anode Location Right  Ventricle    Lead Channel Setting Pacing Anode Terminal Ring    Lead Channel Setting Sensing Cathode Location Right Ventricle    Lead Channel Setting Sensing Cathode Terminal Tip    Lead Channel Setting Pacing Pulse Width 0.4    Lead Channel Setting Pacing Amplitude 2    Lead Channel Setting Pacing Capture Mode Adaptive    Zone Setting Type Category VF    Zone Setting Detection Interval 270    Zone Setting Detection Beats Numerator 30    Zone Setting Detection Beats Denominator 40    Zone Setting Type Category VT    Zone Setting Detection Interval Blank    Zone Setting Type Category VT    Zone Setting Detection Interval 460    Zone Setting Type Category VT    Zone Setting Detection Interval 500    Zone Setting Type Category ATRIAL_FIBRILLATION    Zone Setting Type Category AT/AF    Lead Channel Impedance Value 399    Lead Channel Impedance Value 304    Lead Channel Sensing Intrinsic Amplitude 9.75    Lead Channel Pacing Threshold Amplitude 0.75    Lead Channel Pacing Threshold Pulse Width 0.4    Battery Date Time of Measurements 20230119124118    Battery Status Middle of Service    Battery RRT Trigger 2.727    Battery Remaining Longevity 108    Battery Voltage 2.99    Capacitor Charge Type Reformation    Capacitor Last Charge Date Time 20221021180701    Capacitor Charge Time 3.743    Capacitor Charge Energy 18    Glenn Statistic Date Time Start 20221211104953    Glenn Statistic Date Time End 20230119124121    Glenn Statistic RV Percent Paced 89.48    Atrial Tachy Statistic Date Time Start 20221211104953    Atrial Tachy Statistic Date Time End 20230119124121    Atrial Tachy Statistic AT/AF [place] Percent 0    Therapy Statistic Recent Shocks Delivered 0    Therapy Statistic Recent Shocks Aborted 0    Therapy Statistic Recent ATP Delivered 0    Therapy Statistic Recent Date Time Start 20221211104953    Therapy Statistic Recent Date Time End 20230119124121    Therapy Statistic Total Shocks Delivered 0    Therapy  Statistic Total Shocks Aborted 0    Therapy Statistic Total ATP Delivered 0    Therapy Statistic Total  Date Time Start 23699601500545    Therapy Statistic Total  Date Time End [id number]    Episode Statistic Recent Count 0    Episode Statistic Type Category AT/AF    Episode Statistic Recent Count 0    Episode Statistic Type Category SVT    Episode Statistic Recent Count 0    Episode Statistic Type Category VT    Episode Statistic Recent Count 0    Episode Statistic Type Category VF    Episode Statistic Recent Count 0    Episode Statistic Type Category VT    Episode Statistic Recent Count 0    Episode Statistic Type Category VT    Episode Statistic Recent Count 0    Episode Statistic Type Category VT    Episode Statistic Recent Date Time Start 68457168853859    Episode Statistic Recent Date Time End 94568529564352    Episode Statistic Recent Date Time Start 97979529100087    Episode Statistic Recent Date Time End 24068660245118    Episode Statistic Recent Date Time Start 97847496931883    Episode Statistic Recent Date Time End 69196576614861    Episode Statistic Recent Date Time Start 87015864725850    Episode Statistic Recent Date Time End 65175832239366    Episode Statistic Recent Date Time Start 05349186081707    Episode Statistic Recent Date Time End 02993839614849    Episode Statistic Recent Date Time Start 07768217142637    Episode Statistic Recent Date Time End 67282843842614    Episode Statistic Recent Date Time Start 42346066372296    Episode Statistic Recent Date Time End 43596704323389    Episode Statistic Total Count 233    Episode Statistic Type Category AT/AF    Episode Statistic Total Count 0    Episode Statistic Type Category SVT    Episode Statistic Total Count 0    Episode Statistic Type Category VT    Episode Statistic Total Count 0    Episode Statistic Type Category VF    Episode Statistic Total Count 0    Episode Statistic Type Category VT    Episode Statistic Total Count 0    Episode  Statistic Type Category VT    Episode Statistic Total Count 2    Episode Statistic Type Category VT    Episode Statistic Total Date Time Start 20200316145425    Episode Statistic Total Date Time End 20230119124121    Episode Statistic Total Date Time Start 20200316145425    Episode Statistic Total Date Time End 20230119124121    Episode Statistic Total Date Time Start 20200316145425    Episode Statistic Total Date Time End 20230119124121    Episode Statistic Total Date Time Start 20200316145425    Episode Statistic Total Date Time End 20230119124121    Episode Statistic Total Date Time Start 20200316145425    Episode Statistic Total Date Time End 20230119124121    Episode Statistic Total Date Time Start 20200316145425    Episode Statistic Total Date Time End 20230119124121    Episode Statistic Total Date Time Start 20200316145425    Episode Statistic Total Date Time End 20230119124121    Narrative    Remote ICD transmission received and reviewed. Device transmission sent   per MD orders.     Device: Medtronic [id number] Visia AF MRI VR  Normal Device Function  Mode: VVIR  bpm  : 89.5%  Presenting EGM:  74 bpm  Thoracic Impedance:  Near reference line.   Short V-V intervals: 0  Lead Trends Appear Stable.  Estimated battery longevity to RRT = 7.9-10.1 years.  Battery voltage =   2.99 V.   Atrial Arrhythmia: None  AF [place]: 0%  Anticoagulant: Warfarin  Ventricular Arrhythmia: None  Pt Notified of Transmission Results: My Chart    Plan: RTC 3/23/2023  Candis Gaytan RN    Remote ICD Transmission    I have reviewed and interpreted the device interrogation, settings,   programming and nurse's summary. The device is functioning within normal   device parameters. I agree with the current findings, assessment and plan.     *Note: Due to a large number of results and/or encounters for the requested time period, some results have not been displayed. A complete set of results can be found in Results Review.

## 2023-02-02 NOTE — PROGRESS NOTES
[facility] - CardioMEMS Reading Review    February 2, 2023, 0950   CardioMems period: 1/18/23 - 2/16/2023      CardioMEMS reviewed and routed to patient's provider, Laine BARRY NP.     Current diuretic: Bumex 6 mg BID.  Hydrochlorothiazide 75 mg every Wednesday and Saturday      Current potassium chloride dose:  Potassium 80mEq BID w/ an extra 60 mEq Potassium on Wednesdays & Saturdays    Symptoms: Abdominal swelling  Weights: Today, going back:  195, 200, 196, 194, 192, 195     Changes to plan of care today: INCREASE Hydrochlorothiazide 75mg to Mondays, Wednesdays, and Saturdays.   INCREASE Potassium to 80mEq BID w/ an extra 60 mEq Potassium on Mondays, Wednesdays & Saturdays      Current Threshold parameters: 21 - 24    Today's Waveform:       Readings:       [facility] Date Wedge Pressure MEMS PAD during cath  (average of the 3 values)     Sept 20, 2022 w/MEMS implant     15 mmHg   16 mmHg

## 2023-02-07 PROBLEM — I50.9 ACUTE ON CHRONIC CONGESTIVE HEART FAILURE, UNSPECIFIED HEART FAILURE TYPE (H): Status: ACTIVE | Noted: 2023-01-01

## 2023-02-07 NOTE — PROGRESS NOTES
[facility] - CardioMEMS Reading Review    February 7, 2023, 1045   CardioMems period: 1/18/23 - 2/16/2023      CardioMEMS reviewed and routed to patient's provider, Laine BARRY NP.     Current diuretic: Bumex 6 mg BID.  Hydrochlorothiazide 75 mg every Monday, Wednesday and Saturday      Current potassium chloride dose:  Potassium 80mEq BID w/ an extra 60 mEq Potassium on Mondays, Wednesdays & Saturdays    Last BMP Date: 2/1/2023    Symptoms: Pt reporting fatigue, MIRANDA and increased difficulties completing ADL's.  Pt wife verbalized concern with pt's current state.  Weights: Today: 204 lbs    Changes to plan of care today: Pt instructed to report to the [facility] ER today.  Pt agreeable.  Wife will bring patient this afternoon.    Current Threshold parameters: 21 - 24    Today's Waveform:       Readings:         [facility] Date Wedge Pressure MEMS PAD during cath  (average of the 3 values)     Sept 20, 2022 w/MEMS implant     15 mmHg   16 mmHg       Future Appointments   Date Time Provider Department Center   3/23/2023  7:30 AM  LAB [facility]   3/23/2023  8:00 AM  CV DEVICE 1 [facility]   3/23/2023  8:30 AM Mervat Decker PA-C [facility]   3/23/2023 11:00 AM Hitesh Cartwright DPM [facility]   3/28/2023  2:30 PM [facility]

## 2023-02-08 NOTE — PROGRESS NOTES
Notified by 6C RN at midnight that patient arrived to unit without backup equipment. Per protocol, patient is to have backup controller with him at all times, and if unable to obtain equipment within 4 hours of VAD Coordinator being notified, a new backup controller would have to be issued.     Writer explained this protocol to Bon who is understanding. He was able to call his wife who will bring backup bag to the hospital before 0400.     In the meantime, per protocol, a HM3 controller will stay in the box at bedside until the patient's equipment is obtained. Writer instructed bedside nurse to page the VAD Coordinator on call if equipment doesn't arrive by 0400. At that time, the controller will be issued to patient. Bedside RN and patient have verbalized understanding.

## 2023-02-08 NOTE — PROGRESS NOTES
02/08/23 0959   Appointment Info   Signing Clinician's Name / Credentials (OT) Keagan Headley, OTR/L   Living Environment   People in Home spouse   Current Living Arrangements house   Transportation Anticipated car, drives self;family or friend will provide   Living Environment Comments 4 JAYLYN, all needs met on main floor, tub shwer   Self-Care   Usual Activity Tolerance moderate   Current Activity Tolerance moderate   Regular Exercise No   Equipment Currently Used at Home   (walking stick, cane, shower chair, grab bars (tub/toilet), high toilet)   Fall history within last six months no   Activity/Exercise/Self-Care Comment Pt can walk a couple blocks at baseline, and uses cane or walking stick for mobility. He is I in most ADLs including driving, cooking, and cleaning. Wife completes yardwork and med setup.   General Information   Referring Physician Julianne Malik MD   Patient/Family Therapy Goal Statement (OT) Return to PLOF   Additional Occupational Profile Info/Pertinent History of Current Problem 67 y.o. M with PMH of HFrEF secondary to NICM s/p HM3 LVAD, recurrent driveline infections, HTN, hypothyroid presents with increased weight gain, fatigue concerning for acute on chronic heart failure exacerbation   Existing Precautions/Restrictions no known precautions/restrictions   Cognitive Status Examination   Cognitive Status Comments Mild memory issues at baseline, no overt concerns on eval.   Visual Perception   Visual Acuity Wears bifocals   Clinical Impression   Criteria for Skilled Therapeutic Interventions Met (OT) Yes, treatment indicated   OT Diagnosis Decreased I in ADLs due to deconditioning   Assessment of Occupational Performance 1-3 Performance Deficits   Identified Performance Deficits functional endurance   Clinical Decision Making Complexity (OT) low complexity   Risk & Benefits of therapy have been explained patient;spouse/significant other   OT Total Evaluation Time   OT Eval, Low Complexity  Minutes ([id number]) 42

## 2023-02-08 NOTE — ED PROVIDER NOTES
ED Provider Note  [facility]      History     Chief Complaint   Patient presents with     Generalized Weakness     HPI  Eliseo Tanner is a 67 year old male with an LVAD who presents with concerns for fluid overload.  Patient states that over the past 3 weeks he has been steadily gaining weight, is up 20 pounds.  He notes increasing fatigue but denies shortness of breath.  Patient has cardio mems and was advised to go to the emergency Department due to this reading.  Patient has had his Bumex increased as an outpatient states he has been watching his sodium but still continues to gain weight.  He notes peripheral edema.  No other symptoms noted.    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 [id number] and ;  Surgeon: Dalton Baeza MD;  Location:  HEART CARDIAC CATH LAB     CV CENTRAL VENOUS CATHETER PLACEMENT N/A 02/13/2020    Procedure: Central Venous Catheter Placement;  Surgeon: Chente Moss MD;  Location:  HEART CARDIAC CATH LAB     CV INTRA AORTIC BALLOON N/A 02/07/2020    Procedure: Intra-Aortic Balloon Pump Insertion;  Surgeon: Jose Baldwin MD;  Location:  HEART CARDIAC CATH LAB     CV INTRA AORTIC BALLOON N/A 02/13/2020    Procedure:  Intra-Aortic Balloon Pump Insertion;  Surgeon: Chente Moss MD;  Location:  HEART CARDIAC CATH LAB     CV RIGHT HEART CATH MEASUREMENTS RECORDED N/A 09/21/2020    Procedure: CV RIGHT HEART CATH;  Surgeon: Dalton Baeza MD;  Location:  HEART CARDIAC CATH LAB     CV RIGHT HEART CATH MEASUREMENTS RECORDED N/A 01/07/2021    Procedure: Right Heart Cath;  Surgeon: Chun Ball MD;  Location:  HEART CARDIAC CATH LAB     CV RIGHT HEART CATH MEASUREMENTS RECORDED N/A 02/10/2022    Procedure: CV RIGHT HEART CATH;  Surgeon: Dalton Baeza MD;  Location:  HEART CARDIAC CATH LAB     CV RIGHT HEART CATH MEASUREMENTS RECORDED N/A 09/20/2022    Procedure: Right Heart Catheterization [[id number]];  Surgeon: Dalton Baeza MD;  Location:  HEART CARDIAC CATH LAB     CV RIGHT HEART CATH MEASUREMENTS RECORDED N/A 12/12/2022    Procedure: Right Heart Cath;  Surgeon: Chente Moss MD;  Location:  HEART CARDIAC CATH LAB     CV SWAN LUCIANA PROCEDURE N/A 02/13/2020    Procedure: [place] Luciana Procedure;  Surgeon: Chente Moss MD;  Location:  HEART CARDIAC CATH LAB     EP ICD Bilateral 03/16/2020    Procedure: EP ICD;  Surgeon: Dali Day MD;  Location:  HEART CARDIAC CATH LAB     INCISION AND DRAINAGE CHEST WASHOUT, COMBINED N/A 12/11/2022    Procedure: INCISION AND DRAINAGE OF DRIVELINE;  Surgeon: Mac Jaramillo MD;  Location: [facility] OR     INSERT VENTRICULAR ASSIST DEVICE LEFT (HEARTMATE II) N/A 02/18/2020    Procedure: INSERTION, LEFT VENTRICULAR ASSIST DEVICE (HEARTMATE III);  Surgeon: Mac Jaramillo MD;  Location: [facility] OR     IR CVC TUNNEL PLACEMENT > 5 YRS OF AGE  02/23/2021     IR CVC TUNNEL REMOVAL RIGHT  03/16/2021     MIDLINE INSERTION - DOUBLE LUMEN Left 12/15/2022    left basilic 5 fr dl midline 20 cm     THORACOSCOPY Right 03/06/2020    Procedure: RIGHT VIDEO-ASSISTED THORASCOPIC SURGERY, EVACUATION OF HEMOTHORAX, PLACEMENT OF  "CHEST TUBES;  Surgeon: William Gan MD;  Location: [facility] OR     No current outpatient medications on file.    Allergies   Allergen Reactions     Heparin      HIT screen positive 20, reflex DAVINA negative; however heme recommended treating as if positive  HIT screen negative 20     Oxycodone Itching and Other (See Comments)     Chlorhexidine Rash     Family History  No family history on file.  Social History   Social History     Tobacco Use     Smoking status: Former     Types: Cigarettes     Quit date: 1994     Years since quittin.8     Smokeless tobacco: Never   Substance Use Topics     Alcohol use: Not Currently     Drug use: Never      Past medical history, past surgical history, medications, allergies, family history, and social history were reviewed with the patient. No additional pertinent items.      A medically appropriate review of systems was performed with pertinent positives and negatives noted in the HPI, and all other systems negative.    Physical Exam   Pulse: 63  Temp: 98  F (36.7  C)  Resp: 20  Height: 170.2 cm (5' 7\")  Weight: 92.5 kg (204 lb)  SpO2: 96 %  Physical Exam  Vitals and nursing note reviewed.   Constitutional:       General: He is not in acute distress.     Appearance: He is well-developed. He is not diaphoretic.   HENT:      Head: Normocephalic and atraumatic.      Mouth/Throat:      Pharynx: No oropharyngeal exudate.   Eyes:      General: No scleral icterus.        Right eye: No discharge.         Left eye: No discharge.      Pupils: Pupils are equal, round, and reactive to light.   Cardiovascular:      Comments: Mechanical sound from LVAD  Pulmonary:      Effort: Pulmonary effort is normal. No respiratory distress.      Breath sounds: Normal breath sounds. No wheezing.   Chest:      Chest wall: No tenderness.   Abdominal:      General: Bowel sounds are normal. There is no distension.      Palpations: Abdomen is soft.      Tenderness: There is no abdominal tenderness. "   Musculoskeletal:         General: No tenderness or deformity. Normal range of motion.      Cervical back: Normal range of motion and neck supple.      Right lower leg: Edema present.      Left lower leg: Edema present.   Skin:     General: Skin is warm and dry.      Coloration: Skin is not pale.      Findings: No erythema or rash.   Neurological:      Mental Status: He is alert and oriented to person, place, and time.      Cranial Nerves: No cranial nerve deficit.           ED Course, Procedures, & Data      Procedures                      Results for orders placed or performed during the hospital encounter of 02/07/23   Comprehensive metabolic panel     Status: Abnormal   Result Value Ref Range    Sodium 137 136 - 145 mmol/L    Potassium 3.5 3.4 - 5.3 mmol/L    Chloride 102 98 - 107 mmol/L    Carbon Dioxide (CO2) 23 22 - 29 mmol/L    Anion Gap 12 7 - 15 mmol/L    Urea Nitrogen 45.3 (H) 8.0 - 23.0 mg/dL    Creatinine 1.46 (H) 0.67 - 1.17 mg/dL    Calcium 8.1 (L) 8.8 - 10.2 mg/dL    Glucose 149 (H) 70 - 99 mg/dL    Alkaline Phosphatase 310 (H) 40 - 129 U/L     (H) 10 - 50 U/L    ALT 84 (H) 10 - 50 U/L    Protein Total 7.0 6.4 - 8.3 g/dL    Albumin 3.3 (L) 3.5 - 5.2 g/dL    Bilirubin Total 0.5 <=1.2 mg/dL    GFR Estimate 52 (L) >60 mL/min/1.73m2   Lactate Dehydrogenase     Status: Abnormal   Result Value Ref Range    Lactate Dehydrogenase 288 (H) 0 - 250 U/L   CBC with platelets and differential     Status: Abnormal   Result Value Ref Range    WBC Count 10.5 4.0 - 11.0 10e3/uL    RBC Count 4.24 (L) 4.40 - 5.90 10e6/uL    Hemoglobin 11.7 (L) 13.3 - 17.7 g/dL    Hematocrit 36.5 (L) 40.0 - 53.0 %    MCV 86 78 - 100 fL    MCH 27.6 26.5 - 33.0 pg    MCHC 32.1 31.5 - 36.5 g/dL    RDW 17.8 (H) 10.0 - 15.0 %    Platelet Count 327 150 - 450 10e3/uL    % Neutrophils 77 %    % Lymphocytes 8 %    % Monocytes 11 %    % Eosinophils 2 %    % Basophils 1 %    % Immature Granulocytes 1 %    NRBCs per 100 WBC 0 <1 /100     Absolute Neutrophils 8.2 1.6 - 8.3 10e3/uL    Absolute Lymphocytes 0.8 0.8 - 5.3 10e3/uL    Absolute Monocytes 1.1 0.0 - 1.3 10e3/uL    Absolute Eosinophils 0.2 0.0 - 0.7 10e3/uL    Absolute Basophils 0.1 0.0 - 0.2 10e3/uL    Absolute Immature Granulocytes 0.2 <=0.4 10e3/uL    Absolute NRBCs 0.0 10e3/uL   INR     Status: Abnormal   Result Value Ref Range    INR 2.15 (H) 0.85 - 1.15   [place] Draw     Status: None ()    Narrative    The following orders were created for panel order [place] Draw.  Procedure                               Abnormality         Status                     ---------                               -----------         ------                     Extra Red Top Tube[[id number]]                                                            Please view results for these tests on the individual orders.   Magnesium     Status: Normal   Result Value Ref Range    Magnesium 2.3 1.7 - 2.3 mg/dL   Asymptomatic COVID-19 Virus (Coronavirus) by PCR Nasopharyngeal     Status: Normal    Specimen: Nasopharyngeal; Swab   Result Value Ref Range    SARS CoV2 PCR Negative Negative    Narrative    Testing was performed using the Xpert Xpress SARS-CoV-2 Assay on the Cepheid Gene-Xpert Instrument Systems. Additional information about this Emergency Use Authorization (EUA) assay can be found via the Lab Guide. This test should be ordered for the detection of SARS-CoV-2 in individuals who meet SARS-CoV-2 clinical and/or epidemiological criteria as well as from individuals without symptoms or other reasons to suspect COVID-19. Test performance for asymptomatic patients has only been established in anterior nasal swab specimens. This test is for in vitro diagnostic use under the FDA EUA for laboratories certified under CLIA to perform high complexity testing. This test has not been FDA cleared or approved. A negative result does not rule out the presence of PCR inhibitors in the specimen or target RNA concentration below the  limit of detection for the assay. The possibility of a false negative should be considered if the patient's recent exposure or clinical presentation suggests COVID-19. This test was validated by [facility] [employer]. These Laboratories are certified under the Clinical Laboratory Improvement Amendments (CLIA) as qualified to perform high complexity testing.     CBC with platelets and differential     Status: Abnormal   Result Value Ref Range    WBC Count 10.5 4.0 - 11.0 10e3/uL    RBC Count 4.15 (L) 4.40 - 5.90 10e6/uL    Hemoglobin 11.4 (L) 13.3 - 17.7 g/dL    Hematocrit 36.4 (L) 40.0 - 53.0 %    MCV 88 78 - 100 fL    MCH 27.5 26.5 - 33.0 pg    MCHC 31.3 (L) 31.5 - 36.5 g/dL    RDW 18.0 (H) 10.0 - 15.0 %    Platelet Count 315 150 - 450 10e3/uL    % Neutrophils 79 %    % Lymphocytes 6 %    % Monocytes 12 %    % Eosinophils 1 %    % Basophils 0 %    % Immature Granulocytes 2 %    NRBCs per 100 WBC 0 <1 /100    Absolute Neutrophils 8.3 1.6 - 8.3 10e3/uL    Absolute Lymphocytes 0.6 (L) 0.8 - 5.3 10e3/uL    Absolute Monocytes 1.2 0.0 - 1.3 10e3/uL    Absolute Eosinophils 0.1 0.0 - 0.7 10e3/uL    Absolute Basophils 0.0 0.0 - 0.2 10e3/uL    Absolute Immature Granulocytes 0.2 <=0.4 10e3/uL    Absolute NRBCs 0.0 10e3/uL   CBC with platelets differential     Status: Abnormal    Narrative    The following orders were created for panel order CBC with platelets differential.  Procedure                               Abnormality         Status                     ---------                               -----------         ------                     CBC with platelets and d...[[id number]]  Abnormal            Final result                 Please view results for these tests on the individual orders.   CBC with platelets differential     Status: Abnormal    Narrative    The following orders were created for panel order CBC with platelets differential.  Procedure                               Abnormality         Status                      ---------                               -----------         ------                     CBC with platelets and d...[[id number]]  Abnormal            Final result                 Please view results for these tests on the individual orders.     Medications   Warfarin Dose Required Daily - Pharmacist Managed (has no administration in time range)   aspirin EC tablet 81 mg (has no administration in time range)   allopurinol (ZYLOPRIM) tablet 200 mg (has no administration in time range)   amiodarone (PACERONE) tablet 200 mg (has no administration in time range)   amLODIPine (NORVASC) tablet 10 mg (has no administration in time range)   multivitamin RENAL (RENAVITE RX/NEPHROVITE) tablet 1 tablet (has no administration in time range)   dapagliflozin (FARXIGA) tablet 5 mg (has no administration in time range)   ferrous sulfate (FEROSUL) tablet 325 mg (has no administration in time range)   finasteride (PROSCAR) tablet 5 mg (has no administration in time range)   FLUoxetine (PROzac) capsule 30 mg (has no administration in time range)   hydrALAZINE (APRESOLINE) tablet 100 mg (100 mg Oral Given 2/7/23 2216)   isosorbide mononitrate (IMDUR) 24 hr tablet 90 mg (has no administration in time range)   magnesium oxide (MAG-OX) tablet 400 mg (400 mg Oral or Feeding Tube Given 2/7/23 2216)   pantoprazole (PROTONIX) EC tablet 40 mg (has no administration in time range)   senna-docusate (SENOKOT-S/PERICOLACE) 8.6-50 MG per tablet 1 tablet (has no administration in time range)   tamsulosin (FLOMAX) capsule 0.8 mg (has no administration in time range)   zolpidem (AMBIEN) tablet 5 mg (5 mg Oral Given 2/7/23 2220)   DAPTOmycin (CUBICIN) 600 mg in sodium chloride 0.9 % 100 mL intermittent infusion (has no administration in time range)   levothyroxine (SYNTHROID/LEVOTHROID) tablet 88 mcg (has no administration in time range)   bumetanide (BUMEX) injection 2 mg (2 mg Intravenous Given 2/7/23 2027)   bumetanide (BUMEX) injection 4  mg (4 mg Intravenous Given 2/7/23 2216)   warfarin ANTICOAGULANT (COUMADIN) tablet 4 mg (4 mg Oral Given 2/7/23 2216)     Labs Ordered and Resulted from Time of ED Arrival to Time of ED Departure   COMPREHENSIVE METABOLIC PANEL - Abnormal       Result Value    Sodium 137      Potassium 3.5      Chloride 102      Carbon Dioxide (CO2) 23      Anion Gap 12      Urea Nitrogen 45.3 (*)     Creatinine 1.46 (*)     Calcium 8.1 (*)     Glucose 149 (*)     Alkaline Phosphatase 310 (*)      (*)     ALT 84 (*)     Protein Total 7.0      Albumin 3.3 (*)     Bilirubin Total 0.5      GFR Estimate 52 (*)    LACTATE DEHYDROGENASE - Abnormal    Lactate Dehydrogenase 288 (*)    CBC WITH PLATELETS AND DIFFERENTIAL - Abnormal    WBC Count 10.5      RBC Count 4.24 (*)     Hemoglobin 11.7 (*)     Hematocrit 36.5 (*)     MCV 86      MCH 27.6      MCHC 32.1      RDW 17.8 (*)     Platelet Count 327      % Neutrophils 77      % Lymphocytes 8      % Monocytes 11      % Eosinophils 2      % Basophils 1      % Immature Granulocytes 1      NRBCs per 100 WBC 0      Absolute Neutrophils 8.2      Absolute Lymphocytes 0.8      Absolute Monocytes 1.1      Absolute Eosinophils 0.2      Absolute Basophils 0.1      Absolute Immature Granulocytes 0.2      Absolute NRBCs 0.0     INR - Abnormal    INR 2.15 (*)    CBC WITH PLATELETS AND DIFFERENTIAL - Abnormal    WBC Count 10.5      RBC Count 4.15 (*)     Hemoglobin 11.4 (*)     Hematocrit 36.4 (*)     MCV 88      MCH 27.5      MCHC 31.3 (*)     RDW 18.0 (*)     Platelet Count 315      % Neutrophils 79      % Lymphocytes 6      % Monocytes 12      % Eosinophils 1      % Basophils 0      % Immature Granulocytes 2      NRBCs per 100 WBC 0      Absolute Neutrophils 8.3      Absolute Lymphocytes 0.6 (*)     Absolute Monocytes 1.2      Absolute Eosinophils 0.1      Absolute Basophils 0.0      Absolute Immature Granulocytes 0.2      Absolute NRBCs 0.0     MAGNESIUM - Normal    Magnesium 2.3     COVID-19  VIRUS (CORONAVIRUS) BY PCR - Normal    SARS CoV2 PCR Negative       No orders to display          Medical Decision Making  The patient's presentation is strongly suggestive of an acute health issue posing potential threat to life or bodily function.    The patient's evaluation involved:  review of external note(s) from 3+ sources (Previous notes)  ordering and/or review of 3+ test(s) in this encounter (see separate area of note for details)  discussion of management or test interpretation with another health professional (Cardiology)    The patient's management involved prescription drug management (including medications given in the ED) and a decision regarding hospitalization.      Assessment & Plan    Is a 67-year-old male with an LVAD who presents with increased fluid retention as well as fatigue.  Patient was up 20 pounds from his base weight despite attempts at diuresis as an outpatient.  No issues with his LVAD.  On exam patient has peripheral edema.  Patient was given Bumex in the Emergency Department.  I discussed the case with Cardiology will admit to their service.    I have reviewed the nursing notes. I have reviewed the findings, diagnosis, plan and need for follow up with the patient.    Current Discharge Medication List          Final diagnoses:   Acute on chronic congestive heart failure, unspecified heart failure type (H)       Mac Hewitt DO  [facility] [address] [place]  2/7/2023     Mac Hewitt DO  02/08/23 0116

## 2023-02-08 NOTE — H&P
Cardiology History and Physical    Eliseo Tanner MRN# [id number]   Age: 67 year old YOB: 1955     Date of Admission:  2/7/2023    Primary care provider: Jay Steele          Assessment and Plan:   Assessment:  67 y.o. M with PMH of HFrEF secondary to NICM s/p HM3 LVAD, recurrent driveline infections, HTN, hypothyroid presents with increased weight gain, fatigue concerning for acute on chronic heart failure exacerbation.    Plan:    Acute on chronic systolic heart failure/HFrEF secondary to NICM s/p HM3 LVAD as DT on 2/2020. RHC 12/12/22: RA 9, mPA 22, PCW 13.  Stage D. NYHA Class II-III.  CAD  Clinically appears hypervolemic on admission. On admission MAP 90s, . HM3 speed 5800 rpm  - Fluid status: hypervolemic   - PTA dose Bumex 6mg BID with hydrochlorothiazide 75mg MWS   - Given 6mg IV Bumex in ED, will monitor I/O and dose accordingly  - Afterload reduction:    - hydralazine 100 mg TID    - Imdur 90 mg QD  - BB contraindicated due to bradycardia and RV failure per prior notes  - Aldosterone antagonist deferred due to kidney function  - Statin: held at last admission due to c/f hepatic injury  - SGLT2i: dapagliflozin 5mg QD  - Antiplatelet:  ASA 81mg QD  - Anticoagulation: warfarin per pharmacy goal INR 1.7-2.3  - SCD prophylaxis: ICD  - Remote monitoring: CardioMEMs  - Strict I/O, daily weights, K and Mg replacement    Recurrent driveline infections MRSA/MSSA, 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   - Continue daptomycin 6mg/kg QD  - Will need weekly CK, CBC, CMP while on this medication     HTN  - Continue amlodipine 10mg QD, hydralazine 100mg TID, Imdur 90mg QD     Atrial fibrillation. Slow VT.  - Continue PTA warfarin, pharmacy to dose  - Continue PTA amiodarone 200mg daily    # Chronic Problems  # Gout: allopurinol 200mg QD  # T2DM: dapagliflozin 5mg QD  # GERD: omeprazole 20mg QD  # Hypothyroid: levothyroxine 88mg QD  # BPH: finasteride 5mg QD, tamsulosin 0.8mg QD  # Mood  disorder: fluoxetine 30mg QD  # Insomnia: zolpidem 5mg QHS PRN    FEN: 2L fluid restriction, monitor and replete lytes, cardiac diet  Prophylaxis: warfarin  Code Status: FULL    Disposition: admit to inpatient    Patient to be formally staffed in     Julianne Malik MD MPH  PGY4 Medicine/Dermatology Resident          Chief Complaint:   Weight gain         History of Present Illness:   67 y.o. M with PMH of HFrEF secondary to NICM s/p HM3 LVAD, recurrent driveline infections, HTN, hypothyroid presents with increased weight gain. He has been noticing the weight gain and increased bloating over the past several days. States that he has been taking his medications as prescribed (specifically Bumex, hydrochlorothiazide). He also reports adherence to a cardiac, salt restricted diet. Symptoms were causing difficulty with performing activities of daily living due to weakness, fatigue and edema.    Denies fevers, chills, chest pain, shortness of breath, dyspnea on exertion, orthopnea, recent bleeding. Denies issues with LVAD or concerns for driveline infection. He currently gets daily IV antibiotics for recent driveline infection as outpatient.           Review of Systems:   12 point ROS otherwise negative         Past Medical History:       Medical History reviewed.   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)     MSSA     Legionella pneumonia (H)      LVAD (left ventricular assist device) present (H)      MSSA bacteremia 11/2020             Past Surgical History:   Surgical History reviewed.   Past Surgical History:   Procedure Laterality Date     ANESTHESIA CARDIOVERSION N/A 02/28/2020    Procedure: ANESTHESIA, FOR CARDIOVERSION;  Surgeon: GENERIC ANESTHESIA PROVIDER;  Location: [facility] OR     CV CARDIOMEMS WITH RIGHT HEART CATH  N/A 09/20/2022    Procedure: Pulmonary Arterial Pressure Sensor Placement CPT Codes to be cleared by financial securing for this implant. 79339 and ;  Surgeon: Dalton Baeza MD;  Location:  HEART CARDIAC CATH LAB     CV CENTRAL VENOUS CATHETER PLACEMENT N/A 02/13/2020    Procedure: Central Venous Catheter Placement;  Surgeon: Chente Moss MD;  Location:  HEART CARDIAC CATH LAB     CV INTRA AORTIC BALLOON N/A 02/07/2020    Procedure: Intra-Aortic Balloon Pump Insertion;  Surgeon: Jose Baldwin MD;  Location:  HEART CARDIAC CATH LAB     CV INTRA AORTIC BALLOON N/A 02/13/2020    Procedure: Intra-Aortic Balloon Pump Insertion;  Surgeon: Chente Moss MD;  Location:  HEART CARDIAC CATH LAB     CV RIGHT HEART CATH MEASUREMENTS RECORDED N/A 09/21/2020    Procedure: CV RIGHT HEART CATH;  Surgeon: Dalton Baeza MD;  Location:  HEART CARDIAC CATH LAB     CV RIGHT HEART CATH MEASUREMENTS RECORDED N/A 01/07/2021    Procedure: Right Heart Cath;  Surgeon: Chun Ball MD;  Location:  HEART CARDIAC CATH LAB     CV RIGHT HEART CATH MEASUREMENTS RECORDED N/A 02/10/2022    Procedure: CV RIGHT HEART CATH;  Surgeon: Dalton Baeza MD;  Location:  HEART CARDIAC CATH LAB     CV RIGHT HEART CATH MEASUREMENTS RECORDED N/A 09/20/2022    Procedure: Right Heart Catheterization [[id number]];  Surgeon: Dalton Baeza MD;  Location:  HEART CARDIAC CATH LAB     CV RIGHT HEART CATH MEASUREMENTS RECORDED N/A 12/12/2022    Procedure: Right Heart Cath;  Surgeon: Chente Moss MD;  Location:  HEART CARDIAC CATH LAB     CV SWAN LUCIANA PROCEDURE N/A 02/13/2020    Procedure: [place] Luciana Procedure;  Surgeon: Chente Moss MD;  Location:  HEART CARDIAC CATH LAB     EP ICD Bilateral 03/16/2020    Procedure: EP ICD;  Surgeon: Dali Day MD;  Location:  HEART CARDIAC CATH LAB     INCISION AND DRAINAGE CHEST WASHOUT, COMBINED N/A  2022    Procedure: INCISION AND DRAINAGE OF DRIVELINE;  Surgeon: Mac Jaramillo MD;  Location: [facility]     INSERT VENTRICULAR ASSIST DEVICE LEFT (HEARTMATE II) N/A 2020    Procedure: INSERTION, LEFT VENTRICULAR ASSIST DEVICE (HEARTMATE III);  Surgeon: Mac Jaramillo MD;  Location: [facility]     IR CVC TUNNEL PLACEMENT > 5 YRS OF AGE  2021     IR CVC TUNNEL REMOVAL RIGHT  2021     MIDLINE INSERTION - DOUBLE LUMEN Left 12/15/2022    left basilic 5 fr dl midline 20 cm     THORACOSCOPY Right 2020    Procedure: RIGHT VIDEO-ASSISTED THORASCOPIC SURGERY, EVACUATION OF HEMOTHORAX, PLACEMENT OF CHEST TUBES;  Surgeon: William Gan MD;  Location: [facility] OR             Social History:   Social History reviewed.   Social History     Tobacco Use     Smoking status: Former     Types: Cigarettes     Quit date: 1994     Years since quittin.8     Smokeless tobacco: Never   Substance Use Topics     Alcohol use: Not Currently             Family History:   Family History reviewed.  No family history on file.          Allergies:     Allergies   Allergen Reactions     Heparin      HIT screen positive 20, reflex DAVINA negative; however heme recommended treating as if positive  HIT screen negative 20     Oxycodone Itching and Other (See Comments)     Chlorhexidine Rash             Medications:   Medications Reviewed.   No current facility-administered medications for this encounter.     Current Outpatient Medications   Medication Sig     allopurinol (ZYLOPRIM) 100 MG tablet 2 tablets (200 mg) by Oral or Feeding Tube route daily     amiodarone (PACERONE) 200 MG tablet TAKE 1 TABLET BY MOUTH ONCE DAILY     amLODIPine (NORVASC) 5 MG tablet Take 2 tablets (10 mg) by mouth daily     aspirin (ASA) 81 MG EC tablet Take 1 tablet (81 mg) by mouth daily     B Complex-C-Folic Acid (RENAL) 1 MG CAPS Take 1 capsule by mouth daily     bumetanide (BUMEX) 2 MG tablet Take 3 tablets (6 mg) by mouth 2  "times daily     co-enzyme Q-10 200 MG CAPS 200 mg by Oral or Feeding Tube route daily     dapagliflozin (FARXIGA) 5 MG TABS tablet Take 5 mg by mouth daily     ferrous sulfate (FEROSUL) 325 (65 Fe) MG tablet Take 325 mg by mouth daily (with breakfast)     finasteride (PROSCAR) 5 MG tablet Take 1 tablet (5 mg) by mouth daily Helps with urinary retention.     FLUoxetine (PROZAC) 10 MG capsule Take 30 mg by mouth daily     hydrALAZINE (APRESOLINE) 100 MG tablet Take 1 tablet (100 mg) by mouth 3 times daily     hydrochlorothiazide (HYDRODIURIL) 25 MG tablet Take 75 mg (3 tabs) every Monday's, Wednesday & Saturday's     insulin pen needle (32G X 4 MM) 32G X 4 MM miscellaneous      isosorbide mononitrate (IMDUR) 30 MG 24 hr tablet Take 3 tablets (90 mg) by mouth daily     levothyroxine (SYNTHROID/LEVOTHROID) 75 MCG tablet Take 1 tablet (75 mcg) by mouth every morning (before breakfast)     magnesium oxide (MAG-OX) 400 MG tablet 1 tablet (400 mg) by Oral or Feeding Tube route 2 times daily     omeprazole (PRILOSEC) 20 MG DR capsule Take 20 mg by mouth daily     potassium chloride ER (KLOR-CON M) 20 MEQ CR tablet Take 80 mEq (4 tabs) in the AM and 80 mEq (4 tabs) in the PM. Take an additional 60 mEq on Monday's, Wednesdays and Saturdays with HCTZ     senna-docusate (SENOKOT-S/PERICOLACE) 8.6-50 MG tablet Take 1 tablet by mouth 2 times daily as needed for constipation  (Patient not taking: Reported on 12/26/2022)     tamsulosin (FLOMAX) 0.4 MG capsule Take 2 capsules (0.8 mg) by mouth daily This med helps with urinary retention     warfarin ANTICOAGULANT (COUMADIN) 2 MG tablet Take 2.5 tablets (5 mg) by mouth daily     zolpidem (AMBIEN) 5 MG tablet Take 5 mg by mouth nightly as needed for Insomnia             Physical Exam:   Vitals were reviewed.  Pulse 63, temperature 98  F (36.7  C), temperature source Oral, resp. rate 20, height 1.702 m (5' 7\"), weight 92.5 kg (204 lb), SpO2 96 %.    General: awake, alert, appears " comfortable, NAD  Skin: Not jaundiced, no rash, no ecchymoses  HEENT: MMM  CV: LVAD hum  Resp: CTAB  Abd: Soft, non-tender, BS+, driveline dressing in place  Extremities: bilateral 2+ pitting edema to the knees, warm and well perfused  Neuro: No lateralizing symptoms or focal neurologic deficits         Data:   No intake/output data recorded.    ROUTINE LABS (Last four results)  CMP  Recent Labs   Lab 02/07/23  1826      POTASSIUM 3.5   CHLORIDE 102   CO2 23   ANIONGAP 12   *   BUN 45.3*   CR 1.46*   GFRESTIMATED 52*   CALOS 8.1*   PROTTOTAL 7.0   ALBUMIN 3.3*   BILITOTAL 0.5   ALKPHOS 310*   *   ALT 84*     CBC  Recent Labs   Lab 02/07/23  1826   WBC 10.5   RBC 4.24*   HGB 11.7*   HCT 36.5*   MCV 86   MCH 27.6   MCHC 32.1   RDW 17.8*        INR  Recent Labs   Lab 02/07/23  1838   INR 2.15*     Arterial Blood GasNo lab results found in last 7 days.      I have reviewed today's vital signs, notes, medications, labs and imaging.  I have also seen and examined the patient and agree with the findings and plan as outlined above.  Pt is 68 yo WM with hx of endstage heart failure S/P HM3 in February 2020 which was complicated by RV dysfn and recurrent driveline infections presents with complaints of weight gain, SOB, fatigue and decreased appetite.  Pt markedly hypervolemic with bipedal edema, elevated JVP and CKD with Cr 1.46.  Plan is to begin diuretic therapy, obtain TTE to assess degree of unloading of ventricle and speed optomization.     Daniel Gomes MD, PhD  Professor, Heart Failure and Cardiac Transplantation  [facility]

## 2023-02-08 NOTE — PROGRESS NOTES
Indication of Interrogation:  Heart failure, eval hemodynamic fxn, flows/PI    Type of VAD:  Heartmate 3    Current Parameters:  Flow= 5.0 lpm, Speed= 5800 rpm, Power= 4.8 muhammad, PI= 3.3    Abnormal Alarm on History:  No    Abnormal Events/Parameters Notes:  No, no PI events    Changes Made during Interrogation:  No    D: Stopped by to see patient. No VAD related questions or concerns at this time.   I: Discussed POC and provided support and listened to patient's thoughts and concerns.  Offered emotional support  P: Continue to follow patient and address any questions or concerns patient and or caregiver may have.

## 2023-02-08 NOTE — PHARMACY-ADMISSION MEDICATION HISTORY
Admission Medication History Completed by Pharmacy    See [facility] Admission Navigator for allergy information, preferred outpatient pharmacy, prior to admission medications and immunization status.     Medication History Sources:     EMR review    Outpatient pharmacy fill history ([facility] in [place], [place])    Changes made to PTA medication list (reason):    Added:   o Daptomycin    Deleted: None    Changed:   o Levothyroxine 75 -> 88 mcg daily (dose increased on 2/1/23)  o Warfarin dose updated to most current 4 mg daily (indication: LVAD, INR goal 1.7-2.3)    Additional Information:    Daptomycin is given daily in an infusion center. Indication: driveline infection    Medication Sig Last Dose Taking? Auth Provider   allopurinol (ZYLOPRIM) 100 MG tablet 2 tablets (200 mg) by Oral or Feeding Tube route daily 2/7/2023 Yes Mono Gambino PA-C   amiodarone (PACERONE) 200 MG tablet TAKE 1 TABLET BY MOUTH ONCE DAILY 2/7/2023 Yes Mervat Decker PA-C   amLODIPine (NORVASC) 5 MG tablet Take 2 tablets (10 mg) by mouth daily 2/7/2023 Yes Mervat Decker PA-C   aspirin (ASA) 81 MG EC tablet Take 1 tablet (81 mg) by mouth daily 2/7/2023 Yes Nader Cisneros MD   B Complex-C-Folic Acid (RENAL) 1 MG CAPS Take 1 capsule by mouth daily 2/7/2023 Yes Nader Cisneros MD   bumetanide (BUMEX) 2 MG tablet Take 3 tablets (6 mg) by mouth 2 times daily 2/7/2023 Yes Laine Leon APRN CNP   co-enzyme Q-10 200 MG CAPS 200 mg by Oral or Feeding Tube route daily 2/7/2023 Yes Mono Gambino PA-C   dapagliflozin (FARXIGA) 5 MG TABS tablet Take 5 mg by mouth daily 2/7/2023 Yes Unknown, Entered By History   DAPTOmycin (CUBICIN) 500 MG injection Inject 500 mg into the vein daily 2/7/2023 at 1030 Yes Unknown, Entered By History   ferrous sulfate (FEROSUL) 325 (65 Fe) MG tablet Take 325 mg by mouth daily (with breakfast) 2/7/2023 Yes Reported, Patient   finasteride (PROSCAR) 5 MG tablet Take 1 tablet (5 mg) by mouth daily  Helps with urinary retention. 2/7/2023 Yes Jess Meraz MD   FLUoxetine (PROZAC) 10 MG capsule Take 30 mg by mouth daily 2/7/2023 Yes Unknown, Entered By History   hydrALAZINE (APRESOLINE) 100 MG tablet Take 1 tablet (100 mg) by mouth 3 times daily 2/7/2023 Yes Nader Cisneros MD   hydrochlorothiazide (HYDRODIURIL) 25 MG tablet Take 75 mg (3 tabs) every Monday's, Wednesday & Saturday's 2/6/2023 Yes Laine Leon APRN CNP   isosorbide mononitrate (IMDUR) 30 MG 24 hr tablet Take 3 tablets (90 mg) by mouth daily 2/7/2023 Yes Nader Cisneros MD   levothyroxine (SYNTHROID/LEVOTHROID) 75 MCG tablet Take 1 tablet (75 mcg) by mouth every morning (before breakfast)  Patient taking differently: Take 88 mcg by mouth every morning (before breakfast) 2/7/2023 Yes Jessica Dutton MD   magnesium oxide (MAG-OX) 400 MG tablet 1 tablet (400 mg) by Oral or Feeding Tube route 2 times daily 2/7/2023 Yes Liane Leon APRN CNP   omeprazole (PRILOSEC) 20 MG DR capsule Take 20 mg by mouth daily 2/7/2023 Yes Unknown, Entered By History   potassium chloride ER (KLOR-CON M) 20 MEQ CR tablet Take 80 mEq (4 tabs) in the AM and 80 mEq (4 tabs) in the PM. Take an additional 60 mEq on Monday's, Wednesdays and Saturdays with HCTZ 2/7/2023 Yes Laine Leon APRN CNP   senna-docusate (SENOKOT-S/PERICOLACE) 8.6-50 MG tablet Take 1 tablet by mouth 2 times daily as needed for constipation  Yes Unknown, Entered By History   tamsulosin (FLOMAX) 0.4 MG capsule Take 0.8 mg by mouth every evening This med helps with urinary retention 2/6/2023 Yes Nader Cisneros MD   warfarin ANTICOAGULANT (COUMADIN) 2 MG tablet Take 4 mg by mouth daily 2/6/2023 Yes Laine Leon APRN CNP   zolpidem (AMBIEN) 5 MG tablet Take 5 mg by mouth nightly as needed for Insomnia  Yes Reported, Patient     Date completed: 02/08/23    Medication history completed by: Sarah Kim, PharmD, BCCP, BCPS

## 2023-02-08 NOTE — PLAN OF CARE
Major Shift Events:  WOC RN changed driveline wound and attached hospital VAC therapy (had portable vac from home). Potassium replaced per protocol    Plan: Diuresis- IV Bumex scheduled    For vital signs and complete assessments, please see documentation flowsheets

## 2023-02-08 NOTE — CONSULTS
[facility]  WOC Nurse Inpatient Assessment     Consulted for: LVAD site vac    Patient History (according to provider note(s):      67 y.o. M with PMH of HFrEF secondary to NICM s/p HM3 LVAD, recurrent driveline infections, HTN, hypothyroid presents with increased weight gain, fatigue concerning for acute on chronic heart failure exacerbation.    Areas Assessed:      Areas visualized during today's visit: Abdomen and chest    Negative pressure wound therapy applied to: L) chest        Last photo: 2/8   Wound due to: Surgical Wound   Wound history/plan of care:      Surgical date: Dec 2022     Date Negative Pressure Wound Therapy initiated: 12/2022     Interventions in place: N/A    Is patient s nutritional status compromised? no   a. If yes, what interventions are in place? N/A    Reason for initiating vac therapy? other in place during admission    Wound base: 100 % granulation tissue,      Palpation of the wound bed: normal       Drainage: small      Description of drainage: serosanguinous      Measurements (length x width x depth, in cm) 2.8  x 6.2  x  0.2 cm       Tunneling N/A      Undermining N/A   Periwound skin: Intact and Scar tissue       Color: pink       Temperature: normal    Odor: none   Pain: denies , none   Pain intervention prior to dressing change: patient tolerated well  Treatment goal: Heal  and Decrease moisture  STATUS: initial assessment and healing   Supplies ordered: gathered, at bedside and discussed with RN    Number of foam pieces removed from a wound (excluding foam for bridge) : 1 GranuFoam Black   Verified this matched the number of foam pieces applied last dressing change: N/A - dressing placed outside of the hospital  Number of foam pieces packed into wound (excluding foam for bridge) : 2 GranuFoam Black        Treatment Plan:     Negative pressure wound therapy plan:  Wound location: L) chest   Change Days: Mon/Wed/Fri by WOC RN   "  Supplies (including all accessories) used: small  Black foam   Cleanse with Normal saline prior to replacing VAC    Suction setting: -125   Methods used: Window paned all periwound skin with vac drape prior to applying sponge    Staff RN to assess integrity of dressing and ensure suction is set at appropriate level every shift.   Date canister. Chart canister output every shift. Change cannister weekly and PRN if full/occluded     Remove foam dressing and replace with BID normal saline moist gauze dressing if:   -a dressing failure which cannot be repaired within 2 hours   -patient is discharging to home without a home pump   -patient is discharging to a facility outside the local area   -if a dressing is a \"Silver Foam\", remove before Radiation Therapy or MRI     The hospital VAC pump is not to be discharged with the patient.?Ensure to disconnect patient from machine prior to discharge. Then,    - If a home KCI VAC pump has been delivered, connect home cannister to dressing tubing then connect cannister to home pump and turn on machine    - If transferring to a nearby facility with a KCI vac, can disconnect and clamp tubing then cover end with glove so can be reconnected within 2 hours         Orders: Written    RECOMMEND PRIMARY TEAM ORDER: None, at this time  Education provided: plan of care and wound progress  Discussed plan of care with: Patient, Family, Nurse and Physician  WO nurse follow-up plan: Monday/WednesdayFriday  Notify WO if wound(s) deteriorate.  Nursing to notify the Provider(s) and re-consult the WO Nurse if new skin concern.    DATA:     Current support surface: Standard  Standard gel/foam mattress (IsoFlex, Atmos air, etc)  Containment of urine/stool: Continent of bladder and Continent of bowel  BMI: Body mass index is 32.33 kg/m .   Active diet order: Orders Placed This Encounter      2 Gram Sodium Diet     Output: I/O last 3 completed shifts:  In: 460 [P.O.:460]  Out: 2200 [Urine:2200] "     Labs: Recent Labs   Lab 02/08/23  0721 02/07/23  2220 02/07/23  1838 02/07/23  1826   ALBUMIN  --   --   --  3.3*   HGB  --  11.4*  --  11.7*   INR 2.44*  --    < >  --    WBC  --  10.5  --  10.5    < > = values in this interval not displayed.     Pressure injury risk assessment:   Sensory Perception: 4-->no impairment  Moisture: 4-->rarely moist  Activity: 4-->walks frequently  Mobility: 4-->no limitation  Nutrition: 3-->adequate  Friction and Shear: 3-->no apparent problem  Jens Score: 22    Mansi Manuel RN   Pager no longer is use, please contact through [employer] group: [facility] Nurse  Dept. Office Number: [phone]

## 2023-02-08 NOTE — PROGRESS NOTES
Admission    Diagnosis: HF exacerbation  Admitted from: [facility]  Belongings: At bedside per request; declined sending any items to security  Admission Profile: Complete  Teaching: Orientation to unit, call don't fall, use of console, visiting restrictions, when to call for the RN (chest pain, SOB, etc), and enforced importance of safety   Access: Midline  Telemetry: Placed on patient  Height/Weight: Complete  Skin check completed by:  and Shobha Rangel RN    Eliseo does not have his back-up bag with him at this time. VAD coordinator paged. Coordinator to unit to drop off new controller to be used as back-up if Eliseo is unable to procure his bag within 4 hours. Eliseo states his wife (Chapis) will drive to hospital to drop off his back-up bag. 6C staff to page VAD coordinator if Chapis does not arrive by 0400

## 2023-02-08 NOTE — PLAN OF CARE
Temp: 97.9  F (36.6  C) Temp src: Oral   Pulse: 61   Resp: 16 SpO2: 97 % O2 Device: None (Room air)       D: Admitted for HF exacerbation after high CardioMEMs reading. Hx HFrEF secondary to NICM s/p HM3 LVAD c/b, recurrent driveline infections; HTN, hypothyroid     I/A: Eliseo is A&O x4. Tele in place, paced. VSS on RA. VAD numbers WDL. Wound vac to DL site; dressing changed MWF per Eliseo. Midline in place, SL. Voiding well. No c/o pain or SOB. Up independently. Appeared to sleep between cares overnight    P: Continue to monitor and follow POC. Notify Cards 2 with changes      Goal Outcome Evaluation:    Plan of Care Reviewed With: patient  Overall Patient Progress: improving  Outcome Evaluation: net negative; weight down

## 2023-02-08 NOTE — PROGRESS NOTES
Cardiology Progress Note       Changes Today:   - Bumex 4mg in AM  - Bumex 8mg in afternoon followed by Bumex 8mg q6h  - BMP BID  - Continue to monitor UOp  -TTE    Subjective:  Patient states he feels well overall. States he has been consistent with diet prior to admission. Denies increased salt intake, denies increased fluid intake. Notes persistent swelling in bilateral lower extremities. Denies shortness of breath or dyspnea on exertion. Denies muscle aches or weakness. Has been urinating more frequently without issue since last night.     Physical Exam   Temp: 98.1  F (36.7  C) Temp src: Oral   Pulse: 63   Resp: 16 SpO2: 94 % O2 Device: None (Room air)      Vital Signs with Ranges  Temp:  [97.9  F (36.6  C)-98.1  F (36.7  C)] 98.1  F (36.7  C)  Pulse:  [] 63  Resp:  [16-30] 16  Cuff Mean (mmHg):  [90] 90  SpO2:  [93 %-97 %] 94 %    LVAD Settings  Heartmate 3 LEFT VS  Flow (Lpm): 5.1 Lpm  Pulse Index (PI): 3.3 PI  Speed (rpm): 5800 rpm  Power (muhammad): 4.8 muhammad  Current Hct settin    Intake/Output    Intake/Output Summary (Last 24 hours) at 2023 1350  Last data filed at 2023 1000  Gross per 24 hour   Intake 920 ml   Output 2450 ml   Net -1530 ml     Weight  Vitals:    23 1805 23 0000 23 0400   Weight: 92.5 kg (204 lb) 94.5 kg (208 lb 5.4 oz) 93.6 kg (206 lb 6.4 oz)       Resp: 16        allopurinol  200 mg Oral or Feeding Tube Daily     amiodarone  200 mg Oral Daily     amLODIPine  10 mg Oral Daily     aspirin  81 mg Oral Daily     bumetanide  8 mg Intravenous Q6H     dapagliflozin  5 mg Oral Daily     DAPTOmycin (CUBICIN) intermittent infusion  6 mg/kg (Adjusted) Intravenous Q24H     ferrous sulfate  325 mg Oral Daily with breakfast     finasteride  5 mg Oral Daily     FLUoxetine  30 mg Oral Daily     hydrALAZINE  100 mg Oral TID     isosorbide mononitrate  90 mg Oral Daily     levothyroxine  88 mcg Oral QAM AC     magnesium oxide  400 mg Oral or Feeding Tube BID      "multivitamin RENAL  1 tablet Oral Daily     pantoprazole  40 mg Oral Daily     sodium chloride (PF)  3 mL Intracatheter Q8H     tamsulosin  0.8 mg Oral Daily     warfarin ANTICOAGULANT  3 mg Oral ONCE at 18:00     Warfarin Therapy Reminder  1 each Oral See Admin Instructions        , Pulse 63, temperature 98.1  F (36.7  C), temperature source Oral, resp. rate 16, height 1.702 m (5' 7\"), weight 93.6 kg (206 lb 6.4 oz), SpO2 94 %.  Constitutional: awake, alert, cooperative, no apparent distress, and appears stated age  Eyes: sclera clear, conjunctiva normal  Respiratory: No increased work of breathing, clear to auscultation bilaterally.  Cardiovascular: LVAD hum, no appreciable murmurs  GI: soft, non-distended, non-tender, no masses palpated  Neurologic: Awake, alert, oriented to name, place and time.  No focal deficits.    Data   Recent Labs   Lab Test 02/08/23  1136 02/08/23  0807 02/08/23  0721 02/07/23  1826   NA  --   --  140 137   POTASSIUM  --   --  3.2* 3.5   CHLORIDE  --   --  104 102   CO2  --   --  25 23   ANIONGAP  --   --  11 12   * 107* 113* 149*   BUN  --   --  45.3* 45.3*   CR  --   --  1.43* 1.46*   CALOS  --   --  7.9* 8.1*       Lab Results   Component Value Date    WBC 10.5 02/07/2023    WBC 7.9 03/19/2021     Lab Results   Component Value Date    RBC 4.15 02/07/2023    RBC 3.88 03/19/2021     Lab Results   Component Value Date    HGB 11.4 02/07/2023    HGB 10.1 03/19/2021     Lab Results   Component Value Date    HCT 36.4 02/07/2023    HCT 32.2 03/19/2021     No components found for: MCT  Lab Results   Component Value Date    MCV 88 02/07/2023    MCV 83.0 03/19/2021     Lab Results   Component Value Date    MCH 27.5 02/07/2023    MCH 26.0 03/19/2021     Lab Results   Component Value Date    MCHC 31.3 02/07/2023    MCHC 31.4 03/19/2021     Lab Results   Component Value Date    RDW 18.0 02/07/2023    RDW 22.8 03/19/2021     Lab Results   Component Value Date     02/07/2023     " 2021     2021        Liver Function Studies -   Recent Labs   Lab Test 23  1826   PROTTOTAL 7.0   ALBUMIN 3.3*   BILITOTAL 0.5   ALKPHOS 310*   *   ALT 84*       Venous Blood Gas  No lab results found in last 7 days.    Arterial Blood Gas  No lab results found in last 7 days.       Recent Results (from the past 24 hour(s))   Echocardiogram Limited   Result Value    LVEF  <20%    [facility]    [id number]  [id number]  [id number]  [id number]     [facility],[place]  Echocardiography Laboratory  [address] [place] [id number]     Name: WOLFGANG LEACH  MRN: [id number]  : 1955  Study Date: 2023 11:37 AM  Age: 67 yrs  Gender: Male  Patient Location: [id number]  Reason For Study: CHF  Ordering Physician: GABE JAIN  Performed By: Rosa Bosch     BSA: 2.0 m2  Height: 67 in  Weight: 206 lb  ______________________________________________________________________________  Procedure  Limited Portable Echo Adult. LVAD Echocardiogram with two-dimensional, color  and spectral Doppler performed.  ______________________________________________________________________________  Interpretation Summary  LVAD cannula was seen in the expected anatomic position in the LV apex.  HM3 at 5800RPM.  LVIDd 49mm.  Abnormal septal motion due to paced thythm.  Aortic valve open partially with each systole. Mild AI.  Normal flow velocities.  Moderate tricuspid insufficiency is present.  Global right ventricular function is moderately to severely reduced.  IVC diameter >2.1 cm collapsing <50% with sniff suggests a high RA pressure  estimated at 15 mmHg or greater.  No pericardial effusion is present.  ______________________________________________________________________________  Left Ventricle  The visual ejection fraction is <20%. LVAD cannula was seen in the expected  anatomic position in the LV apex. HM3 at 5800RPM.  LVIDd 49mm.  Abnormal septal motion due  to paced thythm.  Aortic valve open partially with each systole. Mild AI.  Normal flow velocities.     Right Ventricle  Global right ventricular function is moderately to severely reduced. A  pacemaker lead is noted in the right ventricle.     Mitral Valve  The mitral valve is normal. Mild mitral insufficiency is present.     Tricuspid Valve  Moderate tricuspid insufficiency is present. The right ventricular systolic  pressure is approximated at 16.6 mmHg plus the right atrial pressure.  Pulmonary artery systolic pressure is normal.     Pulmonic Valve  The pulmonic valve is normal.     Vessels  IVC diameter >2.1 cm collapsing <50% with sniff suggests a high RA pressure  estimated at 15 mmHg or greater.     Pericardium  No pericardial effusion is present.     ______________________________________________________________________________  Doppler Measurements & Calculations  TR max saumya: 204.0 cm/sec  TR max P.6 mmHg  RVSP(TR): 19.6 mmHg     ______________________________________________________________________________  Report approved by: Graciela Tomlinson 2023 12:53 PM             Assessment & Plan      67 y.o. M with PMH of HFrEF secondary to NICM s/p HM3 LVAD, recurrent driveline infections, HTN, hypothyroid presents with increased weight gain, fatigue concerning for acute on chronic heart failure exacerbation.    Acute on chronic systolic heart failure/HFrEF secondary to NICM s/p HM3 LVAD as DT on 2020. RHC 22: RA 9, mPA 22, PCW 13.  Stage D. NYHA Class II-III.  CAD  Clinically appears hypervolemic on admission. On admission MAP 90s, . HM3 speed 5800 rpm.  Patient LVAD speed previously 5500 and increased in 2022 with Dr. Cisneros. Patient has had multiple hospital admissions with acute on chronic heart failure exacerbations since change.  - Fluid status: hypervolemic              - PTA dose Bumex 6mg BID with hydrochlorothiazide 75mg MWS              - Given 4mg IV Bumex without adequate  UOp, increased to Bumex 8mg q6h   - Monitor UOp and up titrate as needed  - Afterload reduction:               - hydralazine 100 mg TID              - Imdur 90 mg QD  - BB contraindicated due to bradycardia and RV failure per prior notes  - Aldosterone antagonist deferred due to kidney function  - Statin: held at last admission due to c/f hepatic injury  - SGLT2i: dapagliflozin 5mg QD  - Antiplatelet:  ASA 81mg QD  - Anticoagulation: warfarin per pharmacy goal INR 1.7-2.3  - SCD prophylaxis: ICD  - Remote monitoring: CardioMEMs  - Strict I/O, daily weights, K and Mg replacement  - TTE to assess R heart function and appreciate septum location     Recurrent driveline infections MRSA/MSSA, 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   - Continue daptomycin 6mg/kg QD  - Will need weekly CK, CBC, CMP while on this medication  - Wound consult for wound vac dressing change     HTN  - Continue amlodipine 10mg QD, hydralazine 100mg TID, Imdur 90mg QD     Atrial fibrillation. Slow VT.  - Continue PTA warfarin, pharmacy to dose  - Continue PTA amiodarone 200mg daily     # Chronic Problems  # Gout: allopurinol 200mg QD  # T2DM: dapagliflozin 5mg QD  # GERD: omeprazole 20mg QD  # Hypothyroid: levothyroxine 88mg QD  # BPH: finasteride 5mg QD, tamsulosin 0.8mg QD  # Mood disorder: fluoxetine 30mg QD  # Insomnia: zolpidem 5mg QHS PRN     FEN: 2L fluid restriction, monitor and replete lytes, cardiac diet  Prophylaxis: warfarin  Code Status: FULL    This patient was seen and discussed with Dr. Gomes who agrees with the above assessment and plan.     Ruth Zacarias MD  Internal Medicine PGY-1      I have reviewed today's vital signs, notes, medications, labs and imaging.  I have also seen and examined the patient and agree with the findings and plan as outlined above.  Pt with LVAD in place with RV dysfn and CKD3.  Pt remains hypervolemic and will continue diuresis.      Daniel Gomes MD, PhD  Professor, Heart Failure and  Cardiac Transplantation  [facility]

## 2023-02-08 NOTE — PROGRESS NOTES
CLINICAL NUTRITION SERVICES     Reason for Assessment:  Low-sodium (2 g/day) nutrition education     Diet History:  Pt reports he has received low-sodium nutrition education in the past. Patient politely declined further education at this time. Provided cardiac diet menu.      Nutrition Diagnosis:  No nutrition diagnosis at this time.      Nutrition Prescription/Recs:  Continue low-sodium diet.       Interventions:  None today     Goals:    Pt will verbalize at least five high sodium foods and the importance of avoiding added salt to foods for cooking or seasoning foods.      Follow-up:   RD will continue to follow per protocol.   Patient to ask any further nutrition-related questions before discharge. In addition, pt may request outpatient RD appointment.    Angelia Murphy MS, RDN, LDN  6C RD pager: [phone]  Weekend/Holiday RD pager: [phone]

## 2023-02-08 NOTE — ED TRIAGE NOTES
Triage Assessment & Note:    Pulse 63   Temp 98  F (36.7  C) (Oral)   Resp 20   SpO2 96%       Patient presents with: pt has an LVAD and has noted and increase in weakness, edema and fatigue. Pt is up about 20lbs from base weight. Recently increased Bumex and potassium.  Pt has a heartmate 3      Keyonna OMALLEY RN  February 7, 2023           Triage Assessment       Row Name 02/07/23 1326       Triage Assessment (Adult)    Airway WDL WDL       Respiratory WDL    Respiratory WDL WDL       Skin Circulation/Temperature WDL    Skin Circulation/Temperature WDL WDL       Cardiac WDL    Cardiac WDL X  LVAD,       Peripheral/Neurovascular WDL    Peripheral Neurovascular WDL X

## 2023-02-08 NOTE — PHARMACY-ANTICOAGULATION SERVICE
Clinical Pharmacy - Warfarin Dosing Consult     Pharmacy has been consulted to manage this patient s warfarin therapy.  Indication: Atrial Fibrillation;LVAD/RVAD  Therapy Goal: Other - see comments (INR 1.7 - 2.3)  OP Anticoag Clinic: [facility]  Warfarin Prior to Admission: Yes  Warfarin PTA Regimen: 4mg daily  Significant drug interactions: None new (amiodarone, aspirin long term)  Recent documented change in oral intake/nutrition: Unknown    INR   Date Value Ref Range Status   02/07/2023 2.15 (H) 0.85 - 1.15 Final     INR (External)   Date Value Ref Range Status   01/30/2023 2.0 (A) 0.9 - 1.1 Final     Chromogenic Factor 10   Date Value Ref Range Status   02/23/2021 31 (L) 70 - 130 % Final     Comment:     Therapeutic Range:  A Chromogenic Factor 10 level of approximately 20-40%   inversely correlates with an INR of 2-3 for patients receiving Warfarin.   Chromogenic Factor 10 levels below 20% indicate an INR greater than 3 and   levels above 40% indicate an INR less than 2.         Recommend warfarin 4 mg today.  Pharmacy will monitor Eliseo ALISTAIR Tanner daily and order warfarin doses to achieve specified goal.      Please contact pharmacy as soon as possible if the warfarin needs to be held for a procedure or if the warfarin goals change.      Johana Claudio RPH on 2/7/2023 at 9:34 PM

## 2023-02-09 NOTE — PROGRESS NOTES
Cardiology Progress Note       Changes Today:   -Increase LVAD speed to 6000 today given tte from yesterday  -Adding diuril 1000 daily and bumex gtt     Physical Exam   Temp: 98.6  F (37  C) Temp src: Oral   Pulse: 64   Resp: 20 SpO2: 94 % O2 Device: None (Room air)      Vital Signs with Ranges  Temp:  [96.9  F (36.1  C)-98.6  F (37  C)] 98.6  F (37  C)  Pulse:  [59-71] 64  Resp:  [16-24] 20  SpO2:  [91 %-97 %] 94 %    LVAD Settings  Heartmate 3 LEFT VS  Flow (Lpm): 5.2 Lpm  Pulse Index (PI): 3.3 PI  Speed (rpm): 5800 rpm  Power (muhammad): 4.8 muhammad  Current Hct settin    Intake/Output    Intake/Output Summary (Last 24 hours) at 2023 0851  Last data filed at 2023 0800  Gross per 24 hour   Intake 820 ml   Output 3900 ml   Net -3080 ml       Weight  Vitals:    23 1805 23 0000 23 0400 23 0500   Weight: 92.5 kg (204 lb) 94.5 kg (208 lb 5.4 oz) 93.6 kg (206 lb 6.4 oz) 91.9 kg (202 lb 9.6 oz)       Resp: 20            allopurinol  200 mg Oral or Feeding Tube Daily     amiodarone  200 mg Oral Daily     amLODIPine  10 mg Oral Daily     aspirin  81 mg Oral Daily     bumetanide  8 mg Intravenous Q6H     dapagliflozin  5 mg Oral Daily     DAPTOmycin (CUBICIN) intermittent infusion  6 mg/kg (Adjusted) Intravenous Q24H     ferrous sulfate  325 mg Oral Daily with breakfast     finasteride  5 mg Oral Daily     FLUoxetine  30 mg Oral Daily     hydrALAZINE  100 mg Oral TID     isosorbide mononitrate  90 mg Oral Daily     levothyroxine  88 mcg Oral QAM AC     magnesium oxide  400 mg Oral Q4H     magnesium oxide  400 mg Oral or Feeding Tube BID     multivitamin RENAL  1 tablet Oral Daily     pantoprazole  40 mg Oral Daily     potassium chloride  20 mEq Oral Once     potassium chloride  40 mEq Oral Once     sodium chloride (PF)  3 mL Intracatheter Q8H     tamsulosin  0.8 mg Oral Daily     Warfarin Therapy Reminder  1 each Oral See Admin Instructions        , Pulse 64, temperature 98.6  F (37  C),  "temperature source Oral, resp. rate 20, height 1.702 m (5' 7\"), weight 91.9 kg (202 lb 9.6 oz), SpO2 94 %.  Constitutional: awake, alert, cooperative, no apparent distress, and appears stated age  Eyes: sclera clear, conjunctiva normal  Respiratory: No increased work of breathing, good air exchange, no wheezing  Cardiovascular: LVAD hum  GI: soft, non-distended, non-tender, no masses palpated  Skin: no bruising or bleeding  Neurologic: Awake, alert, oriented to name, place and time.  Cranial nerves II-XII are grossly intact.       Data   Recent Labs   Lab Test 02/09/23  0713 02/09/23  0601 02/08/23  1745   NA  --  140 137   POTASSIUM  --  3.0* 3.5   CHLORIDE  --  102 100   CO2  --  27 23   ANIONGAP  --  11 14   GLC 96 90 143*   BUN  --  34.5* 43.9*   CR  --  1.26* 1.35*   CALOS  --  7.8* 7.8*       Lab Results   Component Value Date    WBC 8.5 02/09/2023    WBC 7.9 03/19/2021     Lab Results   Component Value Date    RBC 4.07 02/09/2023    RBC 3.88 03/19/2021     Lab Results   Component Value Date    HGB 11.0 02/09/2023    HGB 10.1 03/19/2021     Lab Results   Component Value Date    HCT 35.2 02/09/2023    HCT 32.2 03/19/2021     No components found for: MCT  Lab Results   Component Value Date    MCV 87 02/09/2023    MCV 83.0 03/19/2021     Lab Results   Component Value Date    MCH 27.0 02/09/2023    MCH 26.0 03/19/2021     Lab Results   Component Value Date    MCHC 31.3 02/09/2023    MCHC 31.4 03/19/2021     Lab Results   Component Value Date    RDW 18.1 02/09/2023    RDW 22.8 03/19/2021     Lab Results   Component Value Date     02/09/2023     03/19/2021     03/17/2021        Liver Function Studies -   Recent Labs   Lab Test 02/07/23  1826   PROTTOTAL 7.0   ALBUMIN 3.3*   BILITOTAL 0.5   ALKPHOS 310*   *   ALT 84*       Venous Blood Gas  No lab results found in last 7 days.    Arterial Blood Gas  No lab results found in last 7 days.       Recent Results (from the past 24 hour(s)) "   Echocardiogram Limited   Result Value    LVEF  <20%    [facility]    [id number]  [id number]  [id number]  [id number]     [facility],[place]  Echocardiography Laboratory  [address] [place] [address]     Name: WOLFGANG LEACH  MRN: [id number]  : 1955  Study Date: 2023 11:37 AM  Age: 67 yrs  Gender: Male  [place] [facility] [place] For Study: CHF  Ordering Physician: GABE JAIN  Performed By: Rosa Bosch     BSA: 2.0 m2  Height: 67 in  Weight: 206 lb  ______________________________________________________________________________  Procedure  Limited Portable Echo Adult. LVAD Echocardiogram with two-dimensional, color  and spectral Doppler performed.  ______________________________________________________________________________  Interpretation Summary  LVAD cannula was seen in the expected anatomic position in the LV apex.  HM3 at 5800RPM.  LVIDd 49mm.  Abnormal septal motion due to paced thythm.  Aortic valve open partially with each systole. Mild AI.  Normal flow velocities.  Moderate tricuspid insufficiency is present.  Global right ventricular function is moderately to severely reduced.  IVC diameter >2.1 cm collapsing <50% with sniff suggests a high RA pressure  estimated at 15 mmHg or greater.  No pericardial effusion is present.  ______________________________________________________________________________  Left Ventricle  The visual ejection fraction is <20%. LVAD cannula was seen in the expected  anatomic position in the LV apex. HM3 at 5800RPM.  LVIDd 49mm.  Abnormal septal motion due to paced thythm.  Aortic valve open partially with each systole. Mild AI.  Normal flow velocities.     Right Ventricle  Global right ventricular function is moderately to severely reduced. A  pacemaker lead is noted in the right ventricle.     Mitral Valve  The mitral valve is normal. Mild mitral insufficiency is present.     Tricuspid Valve  Moderate  tricuspid insufficiency is present. The right ventricular systolic  pressure is approximated at 16.6 mmHg plus the right atrial pressure.  Pulmonary artery systolic pressure is normal.     Pulmonic Valve  The pulmonic valve is normal.     Vessels  IVC diameter >2.1 cm collapsing <50% with sniff suggests a high RA pressure  estimated at 15 mmHg or greater.     Pericardium  No pericardial effusion is present.     ______________________________________________________________________________  Doppler Measurements & Calculations  TR max saumya: 204.0 cm/sec  TR max P.6 mmHg  RVSP(TR): 19.6 mmHg     ______________________________________________________________________________  Report approved by: Graciela Tomlinson 2023 12:53 PM             Blood culture:  Results for orders placed or performed during the hospital encounter of 22   Blood Culture Peripheral Blood    Specimen: Peripheral Blood   Result Value Ref Range    Culture No Growth    Blood Culture Peripheral Blood    Specimen: Peripheral Blood   Result Value Ref Range    Culture No Growth    Results for orders placed or performed in visit on 21   Blood Culture Arm, Right    Specimen: Arm, Right; Blood   Result Value Ref Range    Culture No Growth    Blood Culture Arm, Left    Specimen: Arm, Left; Blood   Result Value Ref Range    Culture No Growth    Results for orders placed or performed during the hospital encounter of 02/15/21   Blood culture    Specimen: Blood    Right Hand   Result Value Ref Range    Specimen Description Blood Right Hand     Culture Micro No growth    Blood culture    Specimen: Blood    Left Arm   Result Value Ref Range    Specimen Description Blood Left Arm     Culture Micro No growth    Blood culture    Specimen: Blood    Right Hand   Result Value Ref Range    Specimen Description Blood Right Hand     Culture Micro No growth    Blood culture    Specimen: Blood    Right Hand   Result Value Ref Range    Specimen Description  Blood Right Hand     Culture Micro No growth    Results for orders placed or performed in visit on 02/08/21   Blood culture    Specimen: Blood    Left Hand   Result Value Ref Range    Specimen Description Blood Left Hand     Culture Micro No growth    Results for orders placed or performed in visit on 02/08/21   Blood culture    Specimen: Blood    Right Hand   Result Value Ref Range    Specimen Description Blood Right Hand     Culture Micro No growth    Results for orders placed or performed during the hospital encounter of 11/14/20   Blood culture    Specimen: Blood    Left Arm   Result Value Ref Range    Specimen Description Blood Left Arm     Culture Micro No growth    Blood culture    Specimen: Blood    Right Arm   Result Value Ref Range    Specimen Description Blood Right Arm     Culture Micro No growth    Blood culture    Specimen: Blood    Right Arm   Result Value Ref Range    Specimen Description Blood Right Arm     Culture Micro No growth    Blood culture    Specimen: Blood    Left Hand   Result Value Ref Range    Specimen Description Blood Left Hand     Culture Micro No growth    Blood culture    Specimen: Blood    Right Hand   Result Value Ref Range    Specimen Description Blood Right Hand     Culture Micro No growth    Blood culture    Specimen: Blood    Left Arm   Result Value Ref Range    Specimen Description Blood Left Arm     Culture Micro No growth    Blood culture    Specimen: Blood    Left Arm   Result Value Ref Range    Specimen Description Blood Left Arm     Culture Micro (A)      Cultured on the 1st day of incubation:  Staphylococcus aureus  Susceptibility testing done on previous specimen      Culture Micro       Critical Value/Significant Value, preliminary result only, called to and read back by  PATRICIO SHANNON, RN 2101 11.15.20 NDP     Blood culture    Specimen: Blood    Left Hand   Result Value Ref Range    Specimen Description Blood Left Hand     Culture Micro (A)      Cultured on the  1st day of incubation:  Staphylococcus aureus      Culture Micro       Critical Value/Significant Value, preliminary result only, called to and read back by  ROSA ACE, KRISTEN 9323 11.15.20 NDP      Culture Micro       (Note)  POSITIVE for STAPHYLOCOCCUS AUREUS and NEGATIVE for the mecA gene  (not MRSA) by [employer] multiplex nucleic acid test. The mecA gene was  not detected. Final identification and antimicrobial susceptibility  testing will be verified by standard methods.    Specimen tested with [employer] multiplex, gram-positive blood culture  nucleic acid test for the following targets: Staph aureus, Staph  epidermidis, Staph lugdunensis, other Staph species, Enterococcus  faecalis, Enterococcus faecium, Streptococcus species, S. agalactiae,  S. anginosus grp., S. pneumoniae, S. pyogenes, Listeria sp., mecA  (methicillin resistance) and Ernst/B (vancomycin resistance).    Critical Value/Significant Value called to and read back by Rosa Ace, Kristen 1049 11.15.20 NDP         Susceptibility    Staphylococcus aureus - DONTA     Clindamycin  Susceptible ug/mL     Erythromycin  Susceptible ug/mL     Gentamicin  Susceptible ug/mL     Oxacillin  Susceptible ug/mL     Tetracycline  Resistant ug/mL     Trimethoprim/Sulfamethoxazole  Susceptible ug/mL     Vancomycin  Susceptible ug/mL   Results for orders placed or performed in visit on 09/21/20   Blood culture    Specimen: Blood    Left Arm   Result Value Ref Range    Specimen Description Blood Left Arm     Culture Micro No growth    Blood culture    Specimen: Blood    Right Arm   Result Value Ref Range    Specimen Description Blood Right Arm     Culture Micro No growth    Results for orders placed or performed during the hospital encounter of 02/06/20   Blood culture    Specimen: Blood    Right Hand   Result Value Ref Range    Specimen Description Blood Right Hand     Culture Micro No growth    Blood culture    Specimen: Blood    Left Hand   Result Value Ref Range     Specimen Description Blood Left Hand     Culture Micro No growth    Blood culture    Specimen: Blood    Left Hand   Result Value Ref Range    Specimen Description Blood Left Hand     Culture Micro No growth    Blood culture    Specimen: Blood    Right Arm   Result Value Ref Range    Specimen Description Blood Right Arm     Culture Micro No growth    Blood culture    Specimen: Blood    Right Hand   Result Value Ref Range    Specimen Description Blood Right Hand     Culture Micro No growth    Blood culture    Specimen: Blood    Right Hand   Result Value Ref Range    Specimen Description Blood Right Hand     Culture Micro No growth    Blood culture    Specimen: Blood    Right Hand   Result Value Ref Range    Specimen Description Blood Right Hand     Culture Micro No growth    Blood culture    Specimen: Blood    Left Hand   Result Value Ref Range    Specimen Description Blood Left Hand     Culture Micro No growth    Blood culture    Specimen: Blood    Left Hand   Result Value Ref Range    Specimen Description Blood Left Hand     Culture Micro (A)      Cultured on the 2nd day of incubation:  Staphylococcus epidermidis      Culture Micro       Critical Value/Significant Value, preliminary result only, called to and read back by  Cee Benavidez RN on 2.16.20 at 2220.  JRT      Culture Micro       (Note)  POSITIVE for STAPHYLOCOCCUS EPIDERMIDIS and POSITIVE for the mecA  gene (resistant to methicillin) by [employer] multiplex nucleic acid  test. Final identification and antimicrobial susceptibility testing  will be verified by standard methods.    Specimen tested with Verigene multiplex, gram-positive blood culture  nucleic acid test for the following targets: Staph aureus, Staph  epidermidis, Staph lugdunensis, other Staph species, Enterococcus  faecalis, Enterococcus faecium, Streptococcus species, S. agalactiae,  S. anginosus grp., S. pneumoniae, S. pyogenes, Listeria sp., mecA  (methicillin resistance) and Ernst/B  (vancomycin resistance).    Critical Value/Significant Value called to and read back by Cee Benavidez RN, 2.17.20 @ 0207 pt.         Susceptibility    Staphylococcus epidermidis - DONTA     Ciprofloxacin  Resistant ug/mL     Clindamycin  Resistant ug/mL     Erythromycin  Resistant ug/mL     Gentamicin  Resistant ug/mL     Levofloxacin  Resistant ug/mL     Oxacillin  Resistant ug/mL     Tetracycline  Susceptible ug/mL     Vancomycin  Susceptible ug/mL   Blood culture    Specimen: Blood    Right Hand   Result Value Ref Range    Specimen Description Blood Right Hand     Culture Micro No growth    Blood culture    Specimen: Blood    Right Hand   Result Value Ref Range    Specimen Description Blood Right Hand     Culture Micro No growth    Blood culture    Specimen: Blood    Left Hand   Result Value Ref Range    Specimen Description Blood Left Hand     Culture Micro (A)      Cultured on the 1st day of incubation:  Proteus mirabilis  Susceptibility testing done on previous specimen      Culture Micro       Critical Value/Significant Value, preliminary result only, called to and read back by  Mima Cabrera RN. @1426. 2.13.20. BS.       Culture Micro (A)      Cultured on the 1st day of incubation:  Enterococcus faecalis  Susceptibility testing done on previous specimen      Culture Micro       Critical Value/Significant Value, preliminary result only, called to and read back by  Alisson Castillo RN on 2.13.20 at 1728.  JRT      Culture Micro       (Note)  POSITIVE for ENTEROCOCCUS FAECALIS and NEGATIVE for Ernst/vanB genes  by [employer] multiplex nucleic acid test. Final identification and  antimicrobial susceptibility testing will be verified by standard  methods.    Specimen tested with Verigene multiplex, gram-positive blood culture  nucleic acid test for the following targets: Staph aureus, Staph  epidermidis, Staph lugdunensis, other Staph species, Enterococcus  faecalis, Enterococcus faecium, Streptococcus  species, S. agalactiae,  S. anginosus grp., S. pneumoniae, S. pyogenes, Listeria sp., mecA  (methicillin resistance) and Ernst/B (vancomycin resistance).    Critical Value/Significant Value called to and read back by MARIA EUGENIA MILLAN RN 2/13/20 2036          Blood culture    Specimen: Blood    Right Hand   Result Value Ref Range    Specimen Description Blood Right Hand     Culture Micro (A)      Cultured on the 1st day of incubation:  Proteus mirabilis      Culture Micro       Critical Value/Significant Value, preliminary result only, called to and read back by  Mima Cabrera RN. @1426. 2.13.20. BS.       Culture Micro (A)      Cultured on the 1st day of incubation:  Enterococcus faecalis      Culture Micro       Critical Value/Significant Value, preliminary result only, called to and read back by  Alisson Leon RN on 2.13.20 at 1728.  JRT      Culture Micro       (Note)  POSITIVE for PROTEUS SPECIES by [employer] multiplex nucleic acid test.  Final identification and antimicrobial susceptibility testing will be  verified by standard methods.    Specimen tested with [employer] multiplex, gram-negative blood culture  nucleic acid test for the following targets: Acinetobacter sp.,  Citrobacter sp., Enterobacter sp., Proteus sp., E. coli, K.  pneumoniae/oxytoca, P. aeruginosa, and the following resistance  markers: CTXM, KPC, NDM, VIM, IMP and OXA.    Critical Value/Significant Value called to and read back by  Alisson Leon RN @ 8796. 2/13/20. AV         Susceptibility    Enterococcus faecalis - DONTA     Ampicillin  Susceptible ug/mL     Penicillin  Susceptible ug/mL     Vancomycin  Susceptible ug/mL     Gentamicin Screen*  Susceptible       * No high level gentamicin resistance found - If no high level gentamicin resistance is found, combination therapy with an aminoglycoside may be indicated for serious enterococcal infections such as bacteremia and endocarditis.    Proteus mirabilis - DONTA     Amikacin   Susceptible ug/mL     Ampicillin  Susceptible ug/mL     Ampicillin/Sulbactam  Susceptible ug/mL     Cefepime  Susceptible ug/mL     Ceftazidime  Susceptible ug/mL     Ceftriaxone  Susceptible ug/mL     Ciprofloxacin  Susceptible ug/mL     Gentamicin  Susceptible ug/mL     Levofloxacin  Susceptible ug/mL     Piperacillin/Tazo  Susceptible ug/mL     Tobramycin  Susceptible ug/mL     Trimethoprim/Sulfamethoxazole  Susceptible ug/mL     Meropenem  Susceptible ug/mL     *Note: Due to a large number of results and/or encounters for the requested time period, some results have not been displayed. A complete set of results can be found in Results Review.      Urine culture:  Results for orders placed or performed during the hospital encounter of 02/06/20   Urine Culture Aerobic Bacterial    Specimen: Catheterized Urine   Result Value Ref Range    Specimen Description Catheterized Urine     Special Requests Specimen received in preservative     Culture Micro No growth      *Note: Due to a large number of results and/or encounters for the requested time period, some results have not been displayed. A complete set of results can be found in Results Review.       Assessment & Plan    67 y.o. M with PMH of HFrEF secondary to NICM s/p HM3 LVAD, recurrent driveline infections, HTN, hypothyroid presents with increased weight gain, fatigue concerning for acute on chronic heart failure exacerbation.     Acute on chronic systolic heart failure/HFrEF secondary to NICM s/p HM3 LVAD as DT on 2/2020. RHC 12/12/22: RA 9, mPA 22, PCW 13.  Stage D. NYHA Class II-III.  CAD  Clinically appears hypervolemic on admission. On admission MAP 90s, . HM3 speed 5800 rpm.  Patient LVAD speed previously 5500 and increased in 9/2022 with Dr. Cisneros. Patient has had multiple hospital admissions with acute on chronic heart failure exacerbations since change. TTE on 2/8 with opening of aortic valve with each beat indicating LV not unloaded enough,  thus will increase speed.  - Fluid status: hypervolemic              - PTA dose Bumex 6mg BID with hydrochlorothiazide 75mg MWS              - Given 4mg IV Bumex without adequate UOp, increased to Bumex 8mg q6h              - Monitor UOp and up titrate as needed  - Afterload reduction:               - hydralazine 100 mg TID              - Imdur 90 mg QD  - BB contraindicated due to bradycardia and RV failure per prior notes  - Aldosterone antagonist deferred due to kidney function  - Statin: held at last admission due to c/f hepatic injury  - SGLT2i: dapagliflozin 5mg QD  - Antiplatelet:  ASA 81mg QD  - Anticoagulation: warfarin per pharmacy goal INR 1.7-2.3  - SCD prophylaxis: ICD  - Remote monitoring: CardioMEMs  - Strict I/O, daily weights, K and Mg replacement  - TTE to assess R heart function and appreciate septum location     Recurrent driveline infections MRSA/MSSA, 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   - Continue daptomycin 6mg/kg QD  - Will need weekly CK, CBC, CMP while on this medication  - Wound consult for wound vac dressing change     HTN  - Continue amlodipine 10mg QD, hydralazine 100mg TID, Imdur 90mg QD     Atrial fibrillation. Slow VT.  - Continue PTA warfarin, pharmacy to dose  - Continue PTA amiodarone 200mg daily     # Chronic Problems  # Gout: allopurinol 200mg QD  # T2DM: dapagliflozin 5mg QD  # GERD: omeprazole 20mg QD  # Hypothyroid: levothyroxine 88mg QD  # BPH: finasteride 5mg QD, tamsulosin 0.8mg QD  # Mood disorder: fluoxetine 30mg QD  # Insomnia: zolpidem 5mg QHS PRN     FEN: 2L fluid restriction, monitor and replete lytes, cardiac diet  Prophylaxis: warfarin  Code Status: FULL     This patient was seen and discussed with Dr. Gomes who agrees with the above assessment and plan.      Odin Worthington MD  Cardiology Fellow  [phone]    I have reviewed today's vital signs, notes, medications, labs and imaging.  I have also seen and examined the patient and agree with the  findings and plan as outlined above.  Pt with TTE revealing AoV opening every other beat.  VSS on diuretic gtt with diuril today.  Speed of LVAD increased--will follow for suck down.  Follow electrolytes.     Daniel Gomes MD, PhD  Professor, Heart Failure and Cardiac Transplantation  [facility]

## 2023-02-09 NOTE — PROGRESS NOTES
Shift [id number]   ?  A/I : Monitored vitals and assessed pt status. A0x4. Vitals stable on RA. V paced 60-70s. HM3 numbers WDL Afebrile. Urinating adequately via urinal. Last bowel movement yesterday per patient. Good appetite on 2 g Na diet and 2 L FR. Denies chest pain, palpitations, shortness of breath, nausea, dizziness. Wound vac in place near driveline site, no other skin concerns observed. Up ad tiffanie in room. IV access: L midline, SL between IV therapies.     Changed: started on Bumex gtt, one time dose IV diuril, HM3 speed adjusted to 6000   Running:   PRN:   ?  P: Continue to monitor Pt status and report changes to Cards 2.

## 2023-02-09 NOTE — CONSULTS
Care Management Initial Consult    General Information  Assessment completed with: Patient,    Type of CM/SW Visit: Initial Assessment    Primary Care Provider verified and updated as needed: Yes   Readmission within the last 30 days:           Advance Care Planning:            Communication Assessment  Patient's communication style: spoken language (English or Bilingual)    Hearing Difficulty or Deaf: yes   Wear Glasses or Blind: yes    Cognitive  Cognitive/Neuro/Behavioral: WDL                      Living Environment:   People in home: grandchild(arnold), spouse  Chapis  Current living Arrangements: house      Able to return to prior arrangements: yes       Family/Social Support:  Care provided by: self, spouse/significant other  Provides care for: grandchild(arnold)  Marital Status:   Wife  Chapis       Description of Support System: Supportive, Involved    Support Assessment: Adequate family and caregiver support    Current Resources:   Patient receiving home care services: No     Community Resources: Infusion Services (outpatient)  Equipment currently used at home:  (walking stick, cane, shower chair, grab bars (tub/toilet), high toilet)  Supplies currently used at home:      Employment/Financial:  Employment Status: retired        Financial Concerns: No concerns identified           Lifestyle & Psychosocial Needs:  Social Determinants of Health     Tobacco Use: Medium Risk     Smoking Tobacco Use: Former     Smokeless Tobacco Use: Never     Passive Exposure: Not on file   Alcohol Use: Not on file   Financial Resource Strain: Not on file   Food Insecurity: Not on file   Transportation Needs: Not on file   Physical Activity: Not on file   Stress: Not on file   Social Connections: Not on file   Intimate Partner Violence: Not on file   Depression: At risk     PHQ-2 Score: 3   Housing Stability: Not on file       Functional Status:  Prior to admission patient needed assistance:              Mental Health  Status:  Mental Health Status: No Current Concerns       Chemical Dependency Status:  Chemical Dependency Status: No Current Concerns             Values/Beliefs:  Spiritual, Cultural Beliefs, Scientology Practices, Values that affect care:                 Additional Information:  Pt known to writer from LVAD program. Pt had LVAD implanted 2/18/2020. Pt admitted 2/7 for acute on chronic heart failure exacerbation. 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 Had wound vac placed (12/18) and discharged with daily IV abx at his local clinic.Pt continues to have the wound vac and reports that he as about another 3-4 weeks of daily IV abx.     Pt has been independent w/ ADLs  IADLs, ambulation and driving. Pt does not have home services.     Pt anticipates discharge home w/ resumption of outpatient IV abx plan at his local clinic.     SW will continue to follow.     VERÓNICA ChavesSW

## 2023-02-09 NOTE — PLAN OF CARE
"  Pulse 65   Temp 97.5  F (36.4  C) (Axillary)   Resp 16   Ht 1.702 m (5' 7\")   Wt 91.9 kg (202 lb 9.6 oz)   SpO2 91%   BMI 31.73 kg/m       Neuro: A&O x4, Pt calls appropriately   Respiratory: Ls clear, pt sating well on room air at 97%   Cardiac: pt on tele, V paced, LVAD numbers  WDL no alarms   GI: Denied nausea, bowel sounds normoactive    : voiding adequately   IV: double lumen midline in place  Skin: wound vac in place at negative 125 to the drive line   Electrolytes: replaced per protocol.  Pain: Denies pain   Mobility: independent   Plan: Continue to monitor pain, VS, heart rhythm, LVAD function, drive line site integrity, fluid status, bowel status, cardiac and respiratory status.  Notify care team of changes in patient condition or other concerns.    "

## 2023-02-09 NOTE — CONSULTS
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 Jaramillo, HTN, hypothyroid presents with increased weight gain, fatigue admitted with acute on chronic heart failure exacerbation. Last CT was previous to last Driveline I&D, currently has a wound vac and undergoing MWF vac changes without increase drainage. CV Surgery was consulted to assess wound.     - Wound appears to be healing well based on wound care pictures, will assess wound tomorrow with wound care team.   - Continue antibiotics per primary team.  - Continue MWF vac changes per WOC  - Other cares per primary team  - Thank you for the opportunity to participate in the care of this patient.    Patient and plan discussed with attending, Dr. Mac Jaramillo.      Mono Gambino PA-C  Cardiothoracic Surgery  February 9, 2023 2:41 PM   p: [phone]       ________________________________________________________________________________________________    HPI: Patient is a 67 year old male with a history of HFrEF secondary to NICM s/p HM3 LVAD, recurrent driveline infections s/p I&D on 12/11/2022 with Dr. Jaramillo, HTN, hypothyroid presents with increased weight gain, fatigue admitted with acute on chronic heart failure exacerbation. Currently has a wound vac and undergoing MWF vac changes. Patient currently admitted under cards 2 service and CVTS was consulted for wound assessment.    No complaints as it pertains to his driveline and wound. Overall feels fatigued from his heart failure exacerbation. Denies any abdominal pain or tenderness. Denies any increased drainage from his wound.     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 [id number] and ;  Surgeon: Dalton Baeza MD;  Location: [facility] CARDIAC CATH [facility]     CV CENTRAL VENOUS CATHETER PLACEMENT N/A 02/13/2020    Procedure: Central Venous Catheter Placement;  Surgeon: Chente Moss MD;  Location: [facility]     CV INTRA AORTIC BALLOON N/A 02/07/2020    Procedure: Intra-Aortic Balloon Pump Insertion;  Surgeon: Jose Baldwin MD;  Location: [facility]     CV INTRA AORTIC BALLOON N/A 02/13/2020    Procedure: Intra-Aortic Balloon Pump Insertion;  Surgeon: Chente Moss MD;  Location: [facility]     CV RIGHT HEART CATH MEASUREMENTS RECORDED N/A 09/21/2020    Procedure: CV RIGHT HEART CATH;  Surgeon: Dalton Baeza MD;  Location: [facility]     CV RIGHT HEART CATH MEASUREMENTS RECORDED N/A 01/07/2021    Procedure: Right Heart Cath;  Surgeon: Chun Ball MD;  Location: [facility]     CV RIGHT HEART CATH MEASUREMENTS RECORDED N/A 02/10/2022    Procedure: CV RIGHT HEART CATH;  Surgeon: Dalton Baeza MD;  Location: [facility]     CV RIGHT HEART CATH MEASUREMENTS RECORDED N/A 09/20/2022    Procedure: Right Heart Catheterization [[id number]];  Surgeon: Dalton Baeza MD;  Location: [facility]     CV RIGHT HEART CATH MEASUREMENTS RECORDED  N/A 2022    Procedure: Right Heart Cath;  Surgeon: Chente Moss MD;  Location:  HEART CARDIAC CATH LAB     CV SWAN LUCIANA PROCEDURE N/A 2020    Procedure: [place] Procedure;  Surgeon: Chente Moss MD;  Location:  HEART CARDIAC CATH LAB     EP ICD Bilateral 2020    Procedure: EP ICD;  Surgeon: Dali Day MD;  Location:  HEART CARDIAC CATH LAB     INCISION AND DRAINAGE CHEST WASHOUT, COMBINED N/A 2022    Procedure: INCISION AND DRAINAGE OF DRIVELINE;  Surgeon: Mac Jaramillo MD;  Location: [facility] OR     INSERT VENTRICULAR ASSIST DEVICE LEFT (HEARTMATE II) N/A 2020    Procedure: INSERTION, LEFT VENTRICULAR ASSIST DEVICE (HEARTMATE III);  Surgeon: Mac Jaramillo MD;  Location: [facility] OR     IR CVC TUNNEL PLACEMENT > 5 YRS OF AGE  2021     IR CVC TUNNEL REMOVAL RIGHT  2021     MIDLINE INSERTION - DOUBLE LUMEN Left 12/15/2022    left basilic 5 fr dl midline 20 cm     THORACOSCOPY Right 2020    Procedure: RIGHT VIDEO-ASSISTED THORASCOPIC SURGERY, EVACUATION OF HEMOTHORAX, PLACEMENT OF CHEST TUBES;  Surgeon: William Gan MD;  Location:  OR       FH:  No family history on file.    SH:  Social History     Socioeconomic History     Marital status:    Tobacco Use     Smoking status: Former     Types: Cigarettes     Quit date: 1994     Years since quittin.8     Smokeless tobacco: Never   Substance and Sexual Activity     Alcohol use: Not Currently     Drug use: Never       Home Meds:  Medications Prior to Admission   Medication Sig Dispense Refill Last Dose     allopurinol (ZYLOPRIM) 100 MG tablet 2 tablets (200 mg) by Oral or Feeding Tube route daily 60 tablet 1 2023     amiodarone (PACERONE) 200 MG tablet TAKE 1 TABLET BY MOUTH ONCE DAILY 90 tablet 3 2023     amLODIPine (NORVASC) 5 MG tablet Take 2 tablets (10 mg) by mouth daily 180 tablet 3 2023     aspirin (ASA) 81 MG EC tablet Take 1 tablet  (81 mg) by mouth daily 90 tablet 3 2/7/2023     B Complex-C-Folic Acid (RENAL) 1 MG CAPS Take 1 capsule by mouth daily 30 capsule 0 2/7/2023     bumetanide (BUMEX) 2 MG tablet Take 3 tablets (6 mg) by mouth 2 times daily 540 tablet 3 2/7/2023     co-enzyme Q-10 200 MG CAPS 200 mg by Oral or Feeding Tube route daily   2/7/2023     dapagliflozin (FARXIGA) 5 MG TABS tablet Take 5 mg by mouth daily   2/7/2023     DAPTOmycin (CUBICIN) 500 MG injection Inject 500 mg into the vein daily   2/7/2023 at 1030     ferrous sulfate (FEROSUL) 325 (65 Fe) MG tablet Take 325 mg by mouth daily (with breakfast)   2/7/2023     finasteride (PROSCAR) 5 MG tablet Take 1 tablet (5 mg) by mouth daily Helps with urinary retention. 30 tablet 0 2/7/2023     FLUoxetine (PROZAC) 10 MG capsule Take 30 mg by mouth daily   2/7/2023     hydrALAZINE (APRESOLINE) 100 MG tablet Take 1 tablet (100 mg) by mouth 3 times daily 270 tablet 3 2/7/2023     hydrochlorothiazide (HYDRODIURIL) 25 MG tablet Take 75 mg (3 tabs) every Monday's, Wednesday & Saturday's 118 tablet 3 2/6/2023     isosorbide mononitrate (IMDUR) 30 MG 24 hr tablet Take 3 tablets (90 mg) by mouth daily 270 tablet 3 2/7/2023     levothyroxine (SYNTHROID/LEVOTHROID) 75 MCG tablet Take 1 tablet (75 mcg) by mouth every morning (before breakfast) (Patient taking differently: Take 88 mcg by mouth every morning (before breakfast)) 30 tablet 1 2/7/2023     magnesium oxide (MAG-OX) 400 MG tablet 1 tablet (400 mg) by Oral or Feeding Tube route 2 times daily 30 tablet 1 2/7/2023     omeprazole (PRILOSEC) 20 MG DR capsule Take 20 mg by mouth daily   2/7/2023     potassium chloride ER (KLOR-CON M) 20 MEQ CR tablet Take 80 mEq (4 tabs) in the AM and 80 mEq (4 tabs) in the PM. Take an additional 60 mEq on Monday's, Wednesdays and Saturdays with HCTZ 840 tablet 3 2/7/2023     senna-docusate (SENOKOT-S/PERICOLACE) 8.6-50 MG tablet Take 1 tablet by mouth 2 times daily as needed for constipation         tamsulosin (FLOMAX) 0.4 MG capsule Take 0.8 mg by mouth every evening This med helps with urinary retention 30 capsule 0 2/6/2023     warfarin ANTICOAGULANT (COUMADIN) 2 MG tablet Take 4 mg by mouth daily   2/6/2023     zolpidem (AMBIEN) 5 MG tablet Take 5 mg by mouth nightly as needed for Insomnia        Allergies:  Allergies   Allergen Reactions     Heparin      HIT screen positive 2/14/20, reflex DAVINA negative; however heme recommended treating as if positive  HIT screen negative 2/11/20     Oxycodone Itching and Other (See Comments)     Chlorhexidine Rash     ROS:  ROS: 10 point ROS neg other than the symptoms noted above in the HPI.    Physical Exam:  Temp:  [96.9  F (36.1  C)-98.6  F (37  C)] 98  F (36.7  C)  Pulse:  [59-74] 59  Resp:  [16-24] 20  SpO2:  [91 %-97 %] 95 %  Gen: NAD, resting comfortably in bed, conversational  HEENT: normocephalic, atraumatic cranium, EOMI, sclerae anicteric. Oral mucosa pink and moist, no tonsillar edema or erythema, midline trachea, nonpalpable thyroid  Lungs: CTA in all fields, no wheezing or rhonchi  CV: VAD hum present. No dependent edema.   Abd: Wound vac in place, no drainage. Positive normal pitched bowel sounds, overall soft and non distended, nontender, no hepatosplenomegaly, no masses/guarding/rebound tenderness.   Musculoskeletal: grossly intact, strength 5/5 upper and lower extremities  Neuro: AOx3, CN II-VII grossly intact, sensation/motor intact in upper and lower extremities  Mental: normal mood and affect, regular rate of speech    Labs:  ABG No lab results found in last 7 days.  CBC  Recent Labs   Lab 02/09/23  0601 02/07/23  2220 02/07/23  1826   WBC 8.5 10.5 10.5   HGB 11.0* 11.4* 11.7*    315 327     BMP  Recent Labs   Lab 02/09/23  0713 02/09/23  0601 02/08/23  1745 02/08/23  1323 02/08/23  1136 02/08/23  0807 02/08/23  0721 02/07/23  1826   NA  --  140 137  --   --   --  140 137   POTASSIUM  --  3.0* 3.5 3.8  --   --  3.2* 3.5   CHLORIDE  --  102 100   --   --   --  104 102   CO2  --  27 23  --   --   --  25 23   BUN  --  34.5* 43.9*  --   --   --  45.3* 45.3*   CR  --  1.26* 1.35*  --   --   --  1.43* 1.46*   GLC 96 90 143*  --  107*   < > 113* 149*    < > = values in this interval not displayed.     LFT  Recent Labs   Lab 02/09/23  0601 02/08/23  0721 02/07/23  1838 02/07/23  1826   AST  --   --   --  122*   ALT  --   --   --  84*   ALKPHOS  --   --   --  310*   BILITOTAL  --   --   --  0.5   ALBUMIN  --   --   --  3.3*   INR 2.49* 2.44* 2.15*  --      PancreasNo lab results found in last 7 days.    Imaging:  No results found for this or any previous visit (from the past 24 hour(s)).   CT C/a/p 12/8/2022  1. LVAD drive line with surrounding fluid in the along the tract in  the subcutaneous right anterior chest wall increased from previous.  Trace fluid surrounding the LVAD outflow tract , similar to prior. No  organized fluid collection or significant associated inflammatory  changes.  2. Left inguinal lymphadenopathy and stable prominent mediastinal  lymph nodes, likely reactive  3. Cholelithiasis without evidence of cholecystitis. Mild intrahepatic  biliary ductal dilation.  4. Trace ascites

## 2023-02-10 NOTE — PROGRESS NOTES
Cardiology Progress Note    Changes Today:   - Continue Bumex 2mg/hr + 8mg q6h  - Continue Diuril 1000mg daily  - Continue increased LVAD speed of 6000  - Start Spironolactone 12.5mg daily  - Strict I/O, daily weights, K and Mg replacement    Subjective:  Care team notes reviewed. Patient states he feels well this morning. Slightly fatigued from diuresis. Notes improvement in swelling of legs. Has been able to ambulate more easily. No lightheadedness or weakness, no chest pain. Has not noticed any change with increase in speed of LVAD. No other concerns to address with the team today.    Physical Exam   Temp: 97.8  F (36.6  C) Temp src: Oral BP: 110/78 Pulse: 60   Resp: 16 SpO2: 97 % O2 Device: None (Room air)      Vital Signs with Ranges  Temp:  [97.4  F (36.3  C)-98.5  F (36.9  C)] 97.8  F (36.6  C)  Pulse:  [59-74] 60  Resp:  [16-20] 16  BP: (110)/(78) 110/78  SpO2:  [95 %-97 %] 97 %    LVAD Settings  Heartmate 3 LEFT VS  Flow (Lpm): 5.3 Lpm  Pulse Index (PI): 3.2 PI  Speed (rpm): 6000 rpm  Power (muhammad): 5.2 muhammad  Current Hct settin    Intake/Output    Intake/Output Summary (Last 24 hours) at 2/10/2023 0932  Last data filed at 2/10/2023 0800  Gross per 24 hour   Intake 1923.33 ml   Output 68818 ml   Net -8451.67 ml       Weight  Vitals:    23 1805 23 0000 23 0400 23 0500   Weight: 92.5 kg (204 lb) 94.5 kg (208 lb 5.4 oz) 93.6 kg (206 lb 6.4 oz) 91.9 kg (202 lb 9.6 oz)    02/10/23 0410   Weight: 86.9 kg (191 lb 8 oz)       Resp: 16        bumetanide 2 mg/hr (02/10/23 0413)         allopurinol  200 mg Oral or Feeding Tube Daily     amiodarone  200 mg Oral Daily     amLODIPine  10 mg Oral Daily     aspirin  81 mg Oral Daily     bumetanide  8 mg Intravenous Q6H     chlorothiazide  1,000 mg Intravenous Daily     dapagliflozin  5 mg Oral Daily     DAPTOmycin (CUBICIN) intermittent infusion  6 mg/kg (Adjusted) Intravenous Q24H     ferrous sulfate  325 mg Oral Daily with breakfast      "finasteride  5 mg Oral Daily     FLUoxetine  30 mg Oral Daily     hydrALAZINE  100 mg Oral TID     isosorbide mononitrate  90 mg Oral Daily     levothyroxine  88 mcg Oral QAM AC     magnesium oxide  400 mg Oral Q4H     magnesium oxide  400 mg Oral or Feeding Tube BID     multivitamin RENAL  1 tablet Oral Daily     pantoprazole  40 mg Oral Daily     potassium chloride  20 mEq Oral Once     potassium chloride ER  80 mEq Oral BID     sodium chloride (PF)  3 mL Intracatheter Q8H     tamsulosin  0.8 mg Oral Daily     Warfarin Therapy Reminder  1 each Oral See Admin Instructions        , Blood pressure 110/78, pulse 60, temperature 97.8  F (36.6  C), temperature source Oral, resp. rate 16, height 1.702 m (5' 7\"), weight 86.9 kg (191 lb 8 oz), SpO2 97 %.     Constitutional: awake, alert, cooperative, no apparent distress, and appears stated age  Eyes: sclera clear, conjunctiva normal, EOMI  Respiratory: No increased work of breathing, good air exchange, no wheezing, no crackles  Cardiovascular: LVAD hum. JVP to 17cm.  Skin: no bruising or bleeding on exposed skin  Extremities: 1+ pitting edema to knees bilaterally  Neurologic: Awake, alert, oriented to name, place and time. No focal deficits.    Data   Recent Labs   Lab Test 02/10/23  0557 02/09/23  2150 02/09/23  1709     --  139   POTASSIUM 3.0* 3.5 3.0*   CHLORIDE 98  --  98   CO2 30*  --  28   ANIONGAP 12  --  13   GLC 90  --  157*   BUN 30.0*  --  32.9*   CR 1.33*  --  1.30*   CALOS 8.4*  --  8.3*       Lab Results   Component Value Date    WBC 8.2 02/10/2023    WBC 7.9 03/19/2021     Lab Results   Component Value Date    RBC 4.38 02/10/2023    RBC 3.88 03/19/2021     Lab Results   Component Value Date    HGB 11.8 02/10/2023    HGB 10.1 03/19/2021     Lab Results   Component Value Date    HCT 37.7 02/10/2023    HCT 32.2 03/19/2021     No components found for: MCT  Lab Results   Component Value Date    MCV 86 02/10/2023    MCV 83.0 03/19/2021     Lab Results "   Component Value Date    MCH 26.9 02/10/2023    MCH 26.0 03/19/2021     Lab Results   Component Value Date    MCHC 31.3 02/10/2023    MCHC 31.4 03/19/2021     Lab Results   Component Value Date    RDW 18.3 02/10/2023    RDW 22.8 03/19/2021     Lab Results   Component Value Date     02/10/2023     03/19/2021     03/17/2021        Liver Function Studies -   Recent Labs   Lab Test 02/07/23  1826   PROTTOTAL 7.0   ALBUMIN 3.3*   BILITOTAL 0.5   ALKPHOS 310*   *   ALT 84*     Assessment & Plan       67 y.o. M with PMH of HFrEF secondary to NICM s/p HM3 LVAD, recurrent driveline infections, HTN, hypothyroid presents with increased weight gain, fatigue concerning for acute on chronic heart failure exacerbation.     Acute on chronic systolic heart failure/HFrEF secondary to NICM s/p HM3 LVAD as DT on 2/2020. RHC 12/12/22: RA 9, mPA 22, PCW 13.  Stage D. NYHA Class II-III.  CAD  Clinically appears hypervolemic on admission. On admission MAP 90s, . HM3 speed 5800 rpm.  Patient LVAD speed previously 5500 and increased in 9/2022 with Dr. Cisneros. Patient has had multiple hospital admissions with acute on chronic heart failure exacerbations since change. TTE on 2/8 with opening of aortic valve with each beat indicating LV not unloaded enough, thus will increase speed.  - Fluid status: hypervolemic              - PTA dose Bumex 6mg BID with hydrochlorothiazide 75mg MWS  - Afterload reduction:               - hydralazine 100 mg TID              - Imdur 90 mg every day  -GDMT   - BB contraindicated due to bradycardia and RV failure per prior notes   - Aldosterone antagonist: Spironolactone 12.5mg daily   - Statin: held at last admission due to c/f hepatic injury   - SGLT2i: dapagliflozin 5mg QD  - Antiplatelet:  ASA 81mg QD  - Anticoagulation: warfarin per pharmacy goal INR 1.7-2.3  - SCD prophylaxis: ICD  - Remote monitoring: Card[facility]MEM[facility]    Plan:  - Continue Bumex 2mg/hr + 8mg q6h  - Continue  Diuril 1000mg daily  - Continue increased LVAD speed of 6000  - TTE prior to discharge to assess septum and LV function at increased LVAD speed  - Strict I/O, daily weights, K and Mg replacement     Recurrent driveline infections MRSA/MSSA, 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   - Continue daptomycin 6mg/kg QD  - Will need weekly CK, CBC, CMP while on this medication  - Wound consult for wound vac dressing change  - CT Surgery consult for wound evaluation     HTN  - Continue amlodipine 10mg QD, hydralazine 100mg TID, Imdur 90mg QD     Atrial fibrillation. Slow VT.  - Continue PTA warfarin, pharmacy to dose  - Continue PTA amiodarone 200mg daily     # Chronic Problems  # Gout: allopurinol 200mg QD  # T2DM: dapagliflozin 5mg QD  # GERD: omeprazole 20mg QD  # Hypothyroid: levothyroxine 88mg QD  # BPH: finasteride 5mg QD, tamsulosin 0.8mg QD  # Mood disorder: fluoxetine 30mg QD  # Insomnia: zolpidem 5mg QHS PRN     FEN: 2L fluid restriction, monitor and replete lytes, cardiac diet  Prophylaxis: warfarin  Code Status: FULL    This patient was seen and discussed with Dr. Jiang who agrees with the above assessment and plan.     Ruth Zacarias MD  Internal Medicine PGY-1

## 2023-02-10 NOTE — CONSULTS
[facility]  WO Nurse Inpatient Assessment     Consulted for: LVAD site vac    Patient History (according to provider note(s):      67 y.o. M with PMH of HFrEF secondary to NICM s/p HM3 LVAD, recurrent driveline infections, HTN, hypothyroid presents with increased weight gain, fatigue concerning for acute on chronic heart failure exacerbation.    Areas Assessed:      Areas visualized during today's visit: Abdomen and chest    Negative pressure wound therapy applied to: L) chest        Last photo: 2/8   Wound due to: Surgical Wound   Wound history/plan of care:    Surgical date: Dec 2022   Date Negative Pressure Wound Therapy initiated: 12/2022   Interventions in place: N/A  Is patient s nutritional status compromised? no   If yes, what interventions are in place? N/A  Reason for initiating vac therapy? other in place during admission    Wound base: 100 % granulation tissue,      Palpation of the wound bed: normal       Drainage: small      Description of drainage: serosanguinous      Measurements (length x width x depth, in cm) 2.8  x 6.2  x  0.2 cm       Tunneling N/A      Undermining N/A   Periwound skin: Intact and Scar tissue       Color: pink       Temperature: normal    Odor: none   Pain: denies , none   Pain intervention prior to dressing change: patient tolerated well  Treatment goal: Heal  and Decrease moisture  STATUS: healing and follow up    Supplies ordered: gathered, at bedside and discussed with RN    Number of foam pieces removed from a wound (excluding foam for bridge) :  2 GranuFoam Black   Verified this matched the number of foam pieces applied last dressing change: yes  Number of foam pieces packed into wound (excluding foam for bridge) : initiated new dressing       Treatment Plan:       Wound location: L) chest   Change Days: Mon/Wed/Fri   Cleanse wound bed with NS and pat dry.  Cut a piece of Hydrofera blue (#[id number]), moisten with NS, wring the  excess amount and place it on open area. Cover with Tegaderm dressing.  If has more drainage, cover with Mepilex dressing.      Orders: Written    RECOMMEND PRIMARY TEAM ORDER: None, at this time  Education provided: plan of care and wound progress  Discussed plan of care with: Patient, Family, Nurse and Physician  [facility] nurse follow-up plan: weekly  Notify [facility] if wound(s) deteriorate.  Nursing to notify the Provider(s) and re-consult the [facility] Nurse if new skin concern.    DATA:     Current support surface: Standard  Standard gel/foam mattress (IsoFlex, Atmos air, etc)  Containment of urine/stool: Continent of bladder and Continent of bowel  BMI: Body mass index is 29.99 kg/m .   Active diet order: Orders Placed This Encounter      2 Gram Sodium Diet     Output: I/O last 3 completed shifts:  In: 1423.33 [P.O.:1280; I.V.:143.33]  Out: 96320 [Urine:58277]     Labs:   Recent Labs   Lab 02/10/23  0557 02/07/23  1838 02/07/23  1826   ALBUMIN  --   --  3.3*   HGB 11.8*   < > 11.7*   INR 2.35*   < >  --    WBC 8.2   < > 10.5    < > = values in this interval not displayed.     Pressure injury risk assessment:   Sensory Perception: 4-->no impairment  Moisture: 4-->rarely moist  Activity: 3-->walks occasionally  Mobility: 4-->no limitation  Nutrition: 3-->adequate  Friction and Shear: 3-->no apparent problem  Jens Score: 21    Mansi Manuel RN   Pager no longer is use, please contact through [employer] group: [facility] Nurse  Dept. Office Number: [phone]

## 2023-02-10 NOTE — PROGRESS NOTES
Infection Prevention Medical Director Note:    This patient resides on a unit that has demonstrated an increased incidence of hospital-onset COVID-19. The response to this increase includes COVID testing of patients on the same unit that are not known to have had COVID-19 in the last 90 days. I am placing the order for this COVID test.    Please reach out to me with any questions.    Wendy Arteaga MD   of Medicine, Division of Infectious Diseases  Contact me on the [employer] or console  pgr [phone]

## 2023-02-10 NOTE — PLAN OF CARE
D/I/A:  PMH HF sec NICM s/p HM3 LVAD, recurrent driveline infections, HTN, hypothyroid presents with increased weight gain, fatigue concerning for A/C HF exacerbation.  Pt stable overnight.  External cath on overnight to allow for sleep.  LVAD #s within WDL.  Denies pain save for some muscle discomfort this am from diuresis.  Potassium replaced per protocol.  DL midline with bumex drip infusing.    P:  Continue with diuresis, driveline wound vac dsg to be changed today by WOC.  Update team with changes/concerns.

## 2023-02-10 NOTE — PROGRESS NOTES
CV Surgery    Present for VAC change with WOC today. Wound appears to be healing well with very minimal depth. No drainage or erythema. Plan to discontinue wound vac today. Further wound care per WOC RN. Antibiotic plan per primary/ID. CVTS will sign off, please do not hesitate to call with questions or concerns.     Discussed with Dr. Clint Gambino PA-C  Cardiothoracic Surgery  p: [phone]

## 2023-02-11 NOTE — PROGRESS NOTES
Cardiology Progress Note       Changes Today:   -Increase spironolactone to 25mg daily  -Continue diuresis  -Continue LVAD speed at 6000    Subjective:  Care team notes reviewed. Patient states he feels well. No dizziness, shortness of breath, chest pain. Improved swelling in lower extremities and abdomen feels less tense. Feels fatigued. No other concerns to address with the team today.     Physical Exam   Temp: 97.8  F (36.6  C) Temp src: Oral BP: 102/69 Pulse: 64   Resp: 16 SpO2: 94 % O2 Device: None (Room air)      Vital Signs with Ranges  Temp:  [97.6  F (36.4  C)-98.4  F (36.9  C)] 97.8  F (36.6  C)  Pulse:  [60-69] 64  Resp:  [16-18] 16  BP: (102-110)/(69-78) 102/69  SpO2:  [94 %-98 %] 94 %    LVAD Settings  Heartmate 3 LEFT VS  Flow (Lpm): 5.4 Lpm  Pulse Index (PI): 3 PI  Speed (rpm): 6000 rpm  Power (muhammad): 5.2 muhammad  Current Hct settin    Intake/Output    Intake/Output Summary (Last 24 hours) at 2023 1732  Last data filed at 2023 1731  Gross per 24 hour   Intake 1163 ml   Output 5750 ml   Net -4587 ml       Weight  Vitals:    23 0000 23 0400 23 0500 02/10/23 0410   Weight: 94.5 kg (208 lb 5.4 oz) 93.6 kg (206 lb 6.4 oz) 91.9 kg (202 lb 9.6 oz) 86.9 kg (191 lb 8 oz)    23 0408   Weight: 83.8 kg (184 lb 12.8 oz)       Resp: 16        bumetanide 2 mg/hr (23 1113)         allopurinol  200 mg Oral or Feeding Tube Daily     amiodarone  200 mg Oral Daily     amLODIPine  10 mg Oral Daily     aspirin  81 mg Oral Daily     bumetanide  8 mg Intravenous Q6H     chlorothiazide  1,000 mg Intravenous Daily     DAPTOmycin (CUBICIN) intermittent infusion  6 mg/kg (Adjusted) Intravenous Q24H     diclofenac  4 g Topical 4x Daily     [START ON 2023] empagliflozin  10 mg Oral Daily     ferrous sulfate  325 mg Oral Daily with breakfast     finasteride  5 mg Oral Daily     FLUoxetine  30 mg Oral Daily     hydrALAZINE  100 mg Oral TID     isosorbide mononitrate  90 mg Oral Daily  "    levothyroxine  88 mcg Oral QAM AC     magnesium oxide  400 mg Oral or Feeding Tube BID     multivitamin RENAL  1 tablet Oral Daily     pantoprazole  40 mg Oral Daily     potassium chloride  20 mEq Oral Once     potassium chloride ER  80 mEq Oral BID     sodium chloride (PF)  3 mL Intracatheter Q8H     [START ON 2/12/2023] spironolactone  25 mg Oral Daily     tamsulosin  0.8 mg Oral Daily     warfarin ANTICOAGULANT  4 mg Oral ONCE at 18:00     Warfarin Therapy Reminder  1 each Oral See Admin Instructions        , Blood pressure 102/69, pulse 64, temperature 97.8  F (36.6  C), temperature source Oral, resp. rate 16, height 1.702 m (5' 7\"), weight 83.8 kg (184 lb 12.8 oz), SpO2 94 %.     Constitutional: awake, alert, cooperative, no apparent distress, and appears stated age  Eyes: sclera clear, conjunctiva normal, EOMI  Respiratory: No increased work of breathing, good air exchange.  Cardiovascular: LVAD hum. JVD to 17cm.  Skin: no bruising or bleeding on exposed skin  Extremities: Trace edema to mid shins bilaterally  Neurologic: Awake, alert, oriented to name, place and time. No focal deficits.      Data   Recent Labs   Lab Test 02/11/23  1329 02/11/23  0609 02/10/23  2006   NA  --  139 136   POTASSIUM 3.7 3.4 3.8   CHLORIDE  --  97* 92*   CO2  --  30* 29   ANIONGAP  --  12 15   GLC  --  91 156*   BUN  --  34.0* 39.4*   CR  --  1.43* 1.67*   CALOS  --  8.6* 9.1       Lab Results   Component Value Date    WBC 8.2 02/11/2023    WBC 7.9 03/19/2021     Lab Results   Component Value Date    RBC 4.53 02/11/2023    RBC 3.88 03/19/2021     Lab Results   Component Value Date    HGB 12.3 02/11/2023    HGB 10.1 03/19/2021     Lab Results   Component Value Date    HCT 39.6 02/11/2023    HCT 32.2 03/19/2021     No components found for: MCT  Lab Results   Component Value Date    MCV 87 02/11/2023    MCV 83.0 03/19/2021     Lab Results   Component Value Date    MCH 27.2 02/11/2023    MCH 26.0 03/19/2021     Lab Results "   Component Value Date    MCHC 31.1 02/11/2023    MCHC 31.4 03/19/2021     Lab Results   Component Value Date    RDW 18.1 02/11/2023    RDW 22.8 03/19/2021     Lab Results   Component Value Date     02/11/2023     03/19/2021     03/17/2021        Liver Function Studies -   Recent Labs   Lab Test 02/07/23  1826   PROTTOTAL 7.0   ALBUMIN 3.3*   BILITOTAL 0.5   ALKPHOS 310*   *   ALT 84*       Venous Blood Gas  No lab results found in last 7 days.    Arterial Blood Gas  No lab results found in last 7 days.       No results found for this or any previous visit (from the past 24 hour(s)).    Blood culture:  Results for orders placed or performed during the hospital encounter of 12/08/22   Blood Culture Peripheral Blood    Specimen: Peripheral Blood   Result Value Ref Range    Culture No Growth    Blood Culture Peripheral Blood    Specimen: Peripheral Blood   Result Value Ref Range    Culture No Growth    Results for orders placed or performed in visit on 08/17/21   Blood Culture Arm, Right    Specimen: Arm, Right; Blood   Result Value Ref Range    Culture No Growth    Blood Culture Arm, Left    Specimen: Arm, Left; Blood   Result Value Ref Range    Culture No Growth    Results for orders placed or performed during the hospital encounter of 02/15/21   Blood culture    Specimen: Blood    Right Hand   Result Value Ref Range    Specimen Description Blood Right Hand     Culture Micro No growth    Blood culture    Specimen: Blood    Left Arm   Result Value Ref Range    Specimen Description Blood Left Arm     Culture Micro No growth    Blood culture    Specimen: Blood    Right Hand   Result Value Ref Range    Specimen Description Blood Right Hand     Culture Micro No growth    Blood culture    Specimen: Blood    Right Hand   Result Value Ref Range    Specimen Description Blood Right Hand     Culture Micro No growth    Results for orders placed or performed in visit on 02/08/21   Blood culture     Specimen: Blood    Left Hand   Result Value Ref Range    Specimen Description Blood Left Hand     Culture Micro No growth    Results for orders placed or performed in visit on 02/08/21   Blood culture    Specimen: Blood    Right Hand   Result Value Ref Range    Specimen Description Blood Right Hand     Culture Micro No growth    Results for orders placed or performed during the hospital encounter of 11/14/20   Blood culture    Specimen: Blood    Left Arm   Result Value Ref Range    Specimen Description Blood Left Arm     Culture Micro No growth    Blood culture    Specimen: Blood    Right Arm   Result Value Ref Range    Specimen Description Blood Right Arm     Culture Micro No growth    Blood culture    Specimen: Blood    Right Arm   Result Value Ref Range    Specimen Description Blood Right Arm     Culture Micro No growth    Blood culture    Specimen: Blood    Left Hand   Result Value Ref Range    Specimen Description Blood Left Hand     Culture Micro No growth    Blood culture    Specimen: Blood    Right Hand   Result Value Ref Range    Specimen Description Blood Right Hand     Culture Micro No growth    Blood culture    Specimen: Blood    Left Arm   Result Value Ref Range    Specimen Description Blood Left Arm     Culture Micro No growth    Blood culture    Specimen: Blood    Left Arm   Result Value Ref Range    Specimen Description Blood Left Arm     Culture Micro (A)      Cultured on the 1st day of incubation:  Staphylococcus aureus  Susceptibility testing done on previous specimen      Culture Micro       Critical Value/Significant Value, preliminary result only, called to and read back by  PATRICIO SHANNON, RN [id number] 11.15.20 NDP     Blood culture    Specimen: Blood    Left Hand   Result Value Ref Range    Specimen Description Blood Left Hand     Culture Micro (A)      Cultured on the 1st day of incubation:  Staphylococcus aureus      Culture Micro       Critical Value/Significant Value, preliminary result only,  called to and read back by  SHA ACE RN [id number] 11.15.20 NDP      Culture Micro       (Note)  POSITIVE for STAPHYLOCOCCUS AUREUS and NEGATIVE for the mecA gene  (not MRSA) by [employer] multiplex nucleic acid test. The mecA gene was  not detected. Final identification and antimicrobial susceptibility  testing will be verified by standard methods.    Specimen tested with [employer] multiplex, gram-positive blood culture  nucleic acid test for the following targets: Staph aureus, Staph  epidermidis, Staph lugdunensis, other Staph species, Enterococcus  faecalis, Enterococcus faecium, Streptococcus species, S. agalactiae,  S. anginosus grp., S. pneumoniae, S. pyogenes, Listeria sp., mecA  (methicillin resistance) and Ernst/B (vancomycin resistance).    Critical Value/Significant Value called to and read back by Sha Ace Rn 6250 11.15.20 NDP         Susceptibility    Staphylococcus aureus - DONTA     Clindamycin <=0.25 Susceptible ug/mL     Erythromycin <=0.25 Susceptible ug/mL     Gentamicin <=0.5 Susceptible ug/mL     Oxacillin 0.5 Susceptible ug/mL     Tetracycline >=16 Resistant ug/mL     Trimethoprim/Sulfamethoxazole <=0.5/9.5 Susceptible ug/mL     Vancomycin 1 Susceptible ug/mL   Results for orders placed or performed in visit on 09/21/20   Blood culture    Specimen: Blood    Left Arm   Result Value Ref Range    Specimen Description Blood Left Arm     Culture Micro No growth    Blood culture    Specimen: Blood    Right Arm   Result Value Ref Range    Specimen Description Blood Right Arm     Culture Micro No growth    Results for orders placed or performed during the hospital encounter of 02/06/20   Blood culture    Specimen: Blood    Right Hand   Result Value Ref Range    Specimen Description Blood Right Hand     Culture Micro No growth    Blood culture    Specimen: Blood    Left Hand   Result Value Ref Range    Specimen Description Blood Left Hand     Culture Micro No growth    Blood culture    Specimen: Blood     Left Hand   Result Value Ref Range    Specimen Description Blood Left Hand     Culture Micro No growth    Blood culture    Specimen: Blood    Right Arm   Result Value Ref Range    Specimen Description Blood Right Arm     Culture Micro No growth    Blood culture    Specimen: Blood    Right Hand   Result Value Ref Range    Specimen Description Blood Right Hand     Culture Micro No growth    Blood culture    Specimen: Blood    Right Hand   Result Value Ref Range    Specimen Description Blood Right Hand     Culture Micro No growth    Blood culture    Specimen: Blood    Right Hand   Result Value Ref Range    Specimen Description Blood Right Hand     Culture Micro No growth    Blood culture    Specimen: Blood    Left Hand   Result Value Ref Range    Specimen Description Blood Left Hand     Culture Micro No growth    Blood culture    Specimen: Blood    Left Hand   Result Value Ref Range    Specimen Description Blood Left Hand     Culture Micro (A)      Cultured on the 2nd day of incubation:  Staphylococcus epidermidis      Culture Micro       Critical Value/Significant Value, preliminary result only, called to and read back by  Cee Benavidez RN on 2.16.20 at 2220.  JRT      Culture Micro       (Note)  POSITIVE for STAPHYLOCOCCUS EPIDERMIDIS and POSITIVE for the mecA  gene (resistant to methicillin) by [employer] multiplex nucleic acid  test. Final identification and antimicrobial susceptibility testing  will be verified by standard methods.    Specimen tested with [employer] multiplex, gram-positive blood culture  nucleic acid test for the following targets: Staph aureus, Staph  epidermidis, Staph lugdunensis, other Staph species, Enterococcus  faecalis, Enterococcus faecium, Streptococcus species, S. agalactiae,  S. anginosus grp., S. pneumoniae, S. pyogenes, Listeria sp., mecA  (methicillin resistance) and Ernst/B (vancomycin resistance).    Critical Value/Significant Value called to and read back by Cee Benavidez,  RN, 2.17.20 @ 0207 pt.         Susceptibility    Staphylococcus epidermidis - DONTA     Ciprofloxacin >=8 Resistant ug/mL     Clindamycin >=8 Resistant ug/mL     Erythromycin >=8 Resistant ug/mL     Gentamicin >=16 Resistant ug/mL     Levofloxacin >=8 Resistant ug/mL     Oxacillin >=4 Resistant ug/mL     Tetracycline 2 Susceptible ug/mL     Vancomycin 1 Susceptible ug/mL   Blood culture    Specimen: Blood    Right Hand   Result Value Ref Range    Specimen Description Blood Right Hand     Culture Micro No growth    Blood culture    Specimen: Blood    Right Hand   Result Value Ref Range    Specimen Description Blood Right Hand     Culture Micro No growth    Blood culture    Specimen: Blood    Left Hand   Result Value Ref Range    Specimen Description Blood Left Hand     Culture Micro (A)      Cultured on the 1st day of incubation:  Proteus mirabilis  Susceptibility testing done on previous specimen      Culture Micro       Critical Value/Significant Value, preliminary result only, called to and read back by  Mima Cabrera RN. @1426. 2.13.20. BS.       Culture Micro (A)      Cultured on the 1st day of incubation:  Enterococcus faecalis  Susceptibility testing done on previous specimen      Culture Micro       Critical Value/Significant Value, preliminary result only, called to and read back by  Alisson Castillo RN on 2.13.20 at 1728.  JRT      Culture Micro       (Note)  POSITIVE for ENTEROCOCCUS FAECALIS and NEGATIVE for Ernst/vanB genes  by [employer] multiplex nucleic acid test. Final identification and  antimicrobial susceptibility testing will be verified by standard  methods.    Specimen tested with [employer] multiplex, gram-positive blood culture  nucleic acid test for the following targets: Staph aureus, Staph  epidermidis, Staph lugdunensis, other Staph species, Enterococcus  faecalis, Enterococcus faecium, Streptococcus species, S. agalactiae,  S. anginosus grp., S. pneumoniae, S. pyogenes, Listeria sp.,  mecA  (methicillin resistance) and Ernst/B (vancomycin resistance).    Critical Value/Significant Value called to and read back by MARIA EUGENIA MILLAN RN 2/13/20 2036          Blood culture    Specimen: Blood    Right Hand   Result Value Ref Range    Specimen Description Blood Right Hand     Culture Micro (A)      Cultured on the 1st day of incubation:  Proteus mirabilis      Culture Micro       Critical Value/Significant Value, preliminary result only, called to and read back by  Mima Cabrera RN. @1426. 2.13.20. BS.       Culture Micro (A)      Cultured on the 1st day of incubation:  Enterococcus faecalis      Culture Micro       Critical Value/Significant Value, preliminary result only, called to and read back by  Alisson Leon RN on 2.13.20 at 1728.  JRT      Culture Micro       (Note)  POSITIVE for PROTEUS SPECIES by [employer] multiplex nucleic acid test.  Final identification and antimicrobial susceptibility testing will be  verified by standard methods.    Specimen tested with [employer] multiplex, gram-negative blood culture  nucleic acid test for the following targets: Acinetobacter sp.,  Citrobacter sp., Enterobacter sp., Proteus sp., E. coli, K.  pneumoniae/oxytoca, P. aeruginosa, and the following resistance  markers: CTXM, KPC, NDM, VIM, IMP and OXA.    Critical Value/Significant Value called to and read back by  Alisson Leon RN @ 8440. 2/13/20. AV         Susceptibility    Enterococcus faecalis - DONTA     Ampicillin <=2 Susceptible ug/mL     Penicillin 4 Susceptible ug/mL     Vancomycin 1 Susceptible ug/mL     Gentamicin Screen*  Susceptible       * No high level gentamicin resistance found - If no high level gentamicin resistance is found, combination therapy with an aminoglycoside may be indicated for serious enterococcal infections such as bacteremia and endocarditis.    Proteus mirabilis - DONTA     Amikacin <=2 Susceptible ug/mL     Ampicillin <=2 Susceptible ug/mL     Ampicillin/Sulbactam <=2  Susceptible ug/mL     Cefepime <=1 Susceptible ug/mL     Ceftazidime <=1 Susceptible ug/mL     Ceftriaxone <=1 Susceptible ug/mL     Ciprofloxacin <=0.25 Susceptible ug/mL     Gentamicin <=1 Susceptible ug/mL     Levofloxacin <=0.12 Susceptible ug/mL     Piperacillin/Tazo <=4 Susceptible ug/mL     Tobramycin <=1 Susceptible ug/mL     Trimethoprim/Sulfamethoxazole <=1/19 Susceptible ug/mL     Meropenem <=0.25 Susceptible ug/mL     *Note: Due to a large number of results and/or encounters for the requested time period, some results have not been displayed. A complete set of results can be found in Results Review.      Urine culture:  Results for orders placed or performed during the hospital encounter of 02/06/20   Urine Culture Aerobic Bacterial    Specimen: Catheterized Urine   Result Value Ref Range    Specimen Description Catheterized Urine     Special Requests Specimen received in preservative     Culture Micro No growth      *Note: Due to a large number of results and/or encounters for the requested time period, some results have not been displayed. A complete set of results can be found in Results Review.       Assessment & Plan          67 y.o. M with PMH of HFrEF secondary to NICM s/p HM3 LVAD, recurrent driveline infections, HTN, hypothyroid presents with increased weight gain, fatigue concerning for acute on chronic heart failure exacerbation.     Acute on chronic systolic heart failure/HFrEF secondary to NICM s/p HM3 LVAD as DT on 2/2020. C 12/12/22: RA 9, mPA 22, PCW 13.  Stage D. NYHA Class II-III.  CAD  Clinically appears hypervolemic on admission. On admission MAP 90s, . HM3 speed 5800 rpm.  Patient LVAD speed previously 5500 and increased in 9/2022 with Dr. Cisneros. Patient has had multiple hospital admissions with acute on chronic heart failure exacerbations since change. TTE on 2/8 with opening of aortic valve with each beat indicating LV not unloaded enough, thus will increase speed.  -  Fluid status: hypervolemic              - PTA dose Bumex 6mg BID with hydrochlorothiazide 75mg MWS  - Afterload reduction:               - hydralazine 100 mg TID              - Imdur 90 mg every day  -GDMT   - BB contraindicated due to bradycardia and RV failure per prior notes   - Aldosterone antagonist: Spironolactone 25mg daily   - Statin: held at last admission due to c/f hepatic injury   - SGLT2i: dapagliflozin 5mg QD  - Antiplatelet:  ASA 81mg QD  - Anticoagulation: warfarin per pharmacy goal INR 1.7-2.3  - SCD prophylaxis: ICD  - Remote monitoring: CardioMEMs    Plan:  - Continue Bumex 2mg/hr + 8mg q6h  - Continue Diuril 1000mg daily  - Continue increased LVAD speed of 6000  - TTE prior to discharge to assess septum and LV function at increased LVAD speed  - Strict I/O, daily weights, K and Mg replacement     Recurrent driveline infections MRSA/MSSA, 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   - Continue daptomycin 6mg/kg QD  - Will need weekly CK, CBC, CMP while on this medication  - Wound consult for wound vac dressing change  - CT Surgery consult for wound evaluation     HTN  - Continue amlodipine 10mg QD, hydralazine 100mg TID, Imdur 90mg QD     Atrial fibrillation. Slow VT.  - Continue PTA warfarin, pharmacy to dose  - Continue PTA amiodarone 200mg daily     # Chronic Problems  # Gout: allopurinol 200mg QD  # T2DM: dapagliflozin 5mg QD  # GERD: omeprazole 20mg QD  # Hypothyroid: levothyroxine 88mg QD  # BPH: finasteride 5mg QD, tamsulosin 0.8mg QD  # Mood disorder: fluoxetine 30mg QD  # Insomnia: zolpidem 5mg QHS PRN     FEN: 2L fluid restriction, monitor and replete lytes, cardiac diet  Prophylaxis: warfarin  Code Status: FULL    This patient was seen and discussed with Dr. Jiang who agrees with the above assessment and plan.     Ruth Zacarias MD  Internal Medicine PGY-1

## 2023-02-11 NOTE — PLAN OF CARE
67 year old male with a history of HFrEF secondary to NICM s/p HM3 LVAD, recurrent driveline infections s/p I&D on 12/11/2022 with Dr. Jaramillo, HTN, hypothyroid presents with increased weight gain, fatigue admitted with acute on chronic heart failure exacerbation. Last CT was previous to last Driveline I&D, currently has a wound vac and undergoing MWF vac changes without increase drainage. CV Surgery was consulted to assess wound.      Neuro: AOx4, denies headache, lightheadedness and dizziness. Arthritis stiffness/pain in hands  Resp: RA >92%. Denies SOB at rest, LS clear and equal bilaterally  Cardiac: Paced rhythm 60-70s, denies chest pain and palpitations. MAP's mildly elevated on doppler. LVAD numbers WDL and no alarms.  GI/: 2g Na and 2L FR, denies N/V. Voiding very frequently on multiple diuretics, tolerating well. LBM today  Skin: Generalized bruising and scabbing, no overt deficits noted. LVAD dressing changed by CVTS/WOC nurse, CDI - changing MWF with hydrofera blue and tegaderm dressing (in room).  Pain: Arthritis stiffness/pain in hands - new PRN Tylenol q4 and scheduled Voltaren gel orders placed and given  Activity: Independent in room  Electrolytes: Replaced K+ 2x and Mag 1x this shift, redraws in AM.  LDAs: L DL Midline infusing Bumex gtt 2 mg/hr.     Will continue to monitor and report changes to team.

## 2023-02-11 NOTE — PLAN OF CARE
D: 67 year old male with a history of HFrEF secondary to NICM s/p HM3 LVAD, recurrent driveline infections s/p I&D on 12/11/2022 with Dr. Jaramillo, HTN, hypothyroid presents with increased weight gain, fatigue admitted with acute on chronic heart failure exacerbation. Last CT was previous to last Driveline I&D, currently has a wound vac and undergoing MWF vac changes without increase drainage. CV Surgery was consulted to assess wound. Wound vac removed 2/10/23 and CVTS signed off.      I: Monitored vitals and assessed pt status.   LDA: midline catheter DL L basilic  Running: Bumex gtt @ 2 mg/hr (8 mL/hr)  PRN: Ambien x1  Tele: V paced   O2: RA while awake; home CPAP while asleep  Mobility: Independent      A: A0x4. VSS. LVAD numbers WDL with no alarms. MAPS's slightly elevated part of shift via doppler. Afebrile. Denied HA, CP, numbness/tingling, nausea, SOB, dizziness/lightheadedness. Denied pain except some arthritis stiffness/pain in hands, given scheduled voltaren gel. 2g Na diet with 2L FR. Urinating adequately via urinal while awake, primofit in place overnight to allow for sleep since urinating frequently d/t multiple diuretics. No BM reported this shift. Driveline dressing CDI. See flowsheets for skin assessments. BLE edema. Appeared to sleep well overnight. K replaced x1 per protocol this shift in evening.      P: Continue to monitor Pt status and report changes to Cards 2. Continue diuresis.      Shift [id number]

## 2023-02-12 NOTE — PLAN OF CARE
67 year old male with a history of HFrEF secondary to NICM s/p HM3 LVAD, recurrent driveline infections s/p I&D on 12/11/2022 with Dr. Jaramillo, HTN, hypothyroid presents with increased weight gain, fatigue admitted with acute on chronic heart failure exacerbation. Last CT was previous to last Driveline I&D, currently has a wound vac and undergoing MWF vac changes without increase drainage. CV Surgery was consulted to assess wound.      Neuro: AOx4, denies headache, lightheadedness and dizziness. Arthritis stiffness/pain in hands  Resp: RA >92%. Denies SOB at rest, LS clear and equal bilaterally  Cardiac: Paced rhythm 60-70s, denies chest pain and palpitations. MAP's mildly elevated on doppler. LVAD numbers WDL and no alarms.  GI/: 2g Na and 2L FR, denies N/V. Voiding very frequently on multiple diuretics, tolerating well. Therapeutic exchange of Farxiga for Jardiance for diabetes management today.  Skin: Generalized bruising and scabbing, no overt deficits noted. LVAD dressing CDI - changing MWF with hydrofera blue and tegaderm dressing (in room).  Pain: Arthritis stiffness/pain in hands - scheduled Voltaren gel being used  Activity: Independent in room  Electrolytes: Replaced K+ 2x and Mag 1x this shift, redraws in AM.  LDAs: L DL Midline infusing Bumex gtt 2 mg/hr.     Will continue to monitor and report changes to team.

## 2023-02-12 NOTE — PLAN OF CARE
D: 67 year old male with a history of HFrEF secondary to NICM s/p HM3 LVAD, recurrent driveline infections s/p I&D on 12/11/2022 with Dr. Jaramillo, HTN, hypothyroid presents with increased weight gain, fatigue admitted with acute on chronic heart failure exacerbation. Last CT was previous to last Driveline I&D, currently has a wound vac and undergoing MWF vac changes without increase drainage. CV Surgery was consulted to assess wound. Wound vac removed 2/10/23 and CVTS signed off.      I: Monitored vitals and assessed pt status.   LDA: midline catheter DL L basilic  Running: Bumex gtt @ 2 mg/hr (8 mL/hr)  PRN: Ambien x1  Tele: V paced   O2: RA while awake; home CPAP while asleep  Mobility: Independent      A: A0x4. VSS. LVAD numbers WDL with no alarms. Doppler MAPS's slightly elevated (80's-90's). Afebrile. Some lower leg cramping/discomfort from diuresing but didn't want anything for it. Denied HA, CP, numbness/tingling, nausea, SOB/MIRANDA, dizziness/lightheadedness. Arthritis stiffness/pain in hands, given scheduled voltaren gel. 2g Na diet with 2L FR. Urinating adequately via urinal while awake, primofit in place overnight to allow for sleep since urinating frequently d/t multiple diuretics. No BM reported this shift, LBM 2/11/23. Driveline dressing CDI, change MWF. See flowsheets for skin assessments. BLE edema but improving. Appeared to sleep well overnight.      P: Continue to monitor Pt status and report changes to Cards 2. Continue diuresis. Plan for TTE prior to discharge.      Shift [id number]

## 2023-02-12 NOTE — PROGRESS NOTES
Cardiology Progress Note     Changes Today:   - Continue Bumex 2mg/hr  - Discontinue Bumex 8mg q6h  - Discontinue Diuril  - Touch base with dental tomorrow for dislodged filling    Subjective:  Care team notes reviewed. No events overnight. Patient states he feels well this morning. Notes improved mobility with decreased swelling in lower extremities. Feels fatigued. Denies shortness of breath, lightheadedness, chest pain, weakness. No other concerns to address with the team this morning.     Physical Exam   Temp: 97.8  F (36.6  C) Temp src: Oral   Pulse: 65   Resp: 18 SpO2: 96 % O2 Device: None (Room air)      Vital Signs with Ranges  Temp:  [97.8  F (36.6  C)-98.4  F (36.9  C)] 97.8  F (36.6  C)  Pulse:  [62-69] 65  Resp:  [16-18] 18  SpO2:  [94 %-98 %] 96 %    LVAD Settings  Heartmate 3 LEFT VS  Flow (Lpm): 5.3 Lpm  Pulse Index (PI): 3.3 PI  Speed (rpm): 6000 rpm  Power (muhammad): 5.1 muhammad  Current Hct settin    Intake/Output    Intake/Output Summary (Last 24 hours) at 2023 0705  Last data filed at 2023 0600  Gross per 24 hour   Intake 1020 ml   Output 5900 ml   Net -4880 ml       Weight  Vitals:    23 0400 23 0500 02/10/23 0410 23 0408   Weight: 93.6 kg (206 lb 6.4 oz) 91.9 kg (202 lb 9.6 oz) 86.9 kg (191 lb 8 oz) 83.8 kg (184 lb 12.8 oz)    23 0400   Weight: 80.6 kg (177 lb 12.8 oz)       Resp: 18        bumetanide 2 mg/hr (238)         allopurinol  200 mg Oral or Feeding Tube Daily     amiodarone  200 mg Oral Daily     amLODIPine  10 mg Oral Daily     aspirin  81 mg Oral Daily     bumetanide  8 mg Intravenous Q6H     chlorothiazide  1,000 mg Intravenous Daily     DAPTOmycin (CUBICIN) intermittent infusion  6 mg/kg (Adjusted) Intravenous Q24H     diclofenac  4 g Topical 4x Daily     empagliflozin  10 mg Oral Daily     ferrous sulfate  325 mg Oral Daily with breakfast     finasteride  5 mg Oral Daily     FLUoxetine  30 mg Oral Daily     hydrALAZINE  100 mg Oral TID  "    isosorbide mononitrate  90 mg Oral Daily     levothyroxine  88 mcg Oral QAM AC     magnesium oxide  400 mg Oral or Feeding Tube BID     multivitamin RENAL  1 tablet Oral Daily     pantoprazole  40 mg Oral Daily     potassium chloride  40 mEq Oral Once     potassium chloride ER  80 mEq Oral BID     sodium chloride (PF)  3 mL Intracatheter Q8H     spironolactone  25 mg Oral Daily     tamsulosin  0.8 mg Oral Daily     Warfarin Therapy Reminder  1 each Oral See Admin Instructions        , Blood pressure 102/69, pulse 65, temperature 97.8  F (36.6  C), temperature source Oral, resp. rate 18, height 1.702 m (5' 7\"), weight 80.6 kg (177 lb 12.8 oz), SpO2 96 %.     Constitutional: awake, alert, cooperative, no apparent distress, and appears stated age  Eyes: sclera clear, conjunctiva normal, EOMI  Respiratory: No increased work of breathing, good air exchange.  Cardiovascular: LVAD hum. JVD to 13cm.  Skin: no bruising or bleeding on exposed skin  Extremities: Trace edema to mid shins bilaterally  Neurologic: Awake, alert, oriented to name, place and time. No focal deficits. .       Data   Recent Labs   Lab Test 02/12/23  0548 02/11/23  1710    137   POTASSIUM 3.3* 3.9   CHLORIDE 94* 93*   CO2 30* 30*   ANIONGAP 12 14   * 132*   BUN 43.6* 40.1*   CR 1.68* 1.54*   CALOS 8.6* 9.4       Lab Results   Component Value Date    WBC 8.9 02/12/2023    WBC 7.9 03/19/2021     Lab Results   Component Value Date    RBC 4.65 02/12/2023    RBC 3.88 03/19/2021     Lab Results   Component Value Date    HGB 12.3 02/12/2023    HGB 10.1 03/19/2021     Lab Results   Component Value Date    HCT 40.2 02/12/2023    HCT 32.2 03/19/2021     No components found for: MCT  Lab Results   Component Value Date    MCV 87 02/12/2023    MCV 83.0 03/19/2021     Lab Results   Component Value Date    MCH 26.5 02/12/2023    MCH 26.0 03/19/2021     Lab Results   Component Value Date    MCHC 30.6 02/12/2023    MCHC 31.4 03/19/2021     Lab Results "   Component Value Date    RDW 18.1 02/12/2023    RDW 22.8 03/19/2021     Lab Results   Component Value Date     02/12/2023     03/19/2021     03/17/2021        Liver Function Studies -   Recent Labs   Lab Test 02/07/23  1826   PROTTOTAL 7.0   ALBUMIN 3.3*   BILITOTAL 0.5   ALKPHOS 310*   *   ALT 84*     Assessment & Plan       67 y.o. M with PMH of HFrEF secondary to NICM s/p HM3 LVAD, recurrent driveline infections, HTN, hypothyroid presents with increased weight gain, fatigue concerning for acute on chronic heart failure exacerbation.     Acute on chronic systolic heart failure/HFrEF secondary to NICM s/p HM3 LVAD as DT on 2/2020. RHC 12/12/22: RA 9, mPA 22, PCW 13.  Stage D. NYHA Class II-III.  CAD  Clinically appears hypervolemic on admission. On admission MAP 90s, . HM3 speed 5800 rpm.  Patient LVAD speed previously 5500 and increased in 9/2022 with Dr. Cisneros. Patient has had multiple hospital admissions with acute on chronic heart failure exacerbations since change. TTE on 2/8 with opening of aortic valve with each beat indicating LV not unloaded enough, thus will increase speed.  - Fluid status: hypervolemic              - PTA dose Bumex 6mg BID with hydrochlorothiazide 75mg MWS  - Afterload reduction:               - hydralazine 100 mg TID              - Imdur 90 mg every day  -GDMT   - BB contraindicated due to bradycardia and RV failure per prior notes   - Aldosterone antagonist: Spironolactone 25mg daily   - Statin: held at last admission due to c/f hepatic injury   - SGLT2i: dapagliflozin 5mg QD  - Antiplatelet:  ASA 81mg QD  - Anticoagulation: warfarin per pharmacy goal INR 1.7-2.3  - SCD prophylaxis: ICD  - Remote monitoring: CardioMEMs    Plan:  - Continue Bumex 2mg/hr   - Discontinue Bumex 8mg q6h and Diuril 1000mg  - Continue increased LVAD speed of 6000  - TTE prior to discharge to assess septum and LV function at increased LVAD speed  - Strict I/O, daily weights,  K and Mg replacement     Recurrent driveline infections MRSA/MSSA, 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   - Continue daptomycin 6mg/kg QD  - Will need weekly CK, CBC, CMP while on this medication  - Wound consult for wound vac dressing change  - CT Surgery consult for wound evaluation     HTN  - Continue amlodipine 10mg QD, hydralazine 100mg TID, Imdur 90mg QD     Atrial fibrillation. Slow VT.  - Continue PTA warfarin, pharmacy to dose  - Continue PTA amiodarone 200mg daily     # Chronic Problems  # Gout: allopurinol 200mg QD  # T2DM: dapagliflozin 5mg QD  # GERD: omeprazole 20mg QD  # Hypothyroid: levothyroxine 88mg QD  # BPH: finasteride 5mg QD, tamsulosin 0.8mg QD  # Mood disorder: fluoxetine 30mg QD  # Insomnia: zolpidem 5mg QHS PRN     FEN: 2L fluid restriction, monitor and replete lytes, cardiac diet  Prophylaxis: warfarin  Code Status: FULL    This patient was seen and discussed with Dr. Jiang who agrees with the above assessment and plan.     Ruth Zacarias MD  Internal Medicine PGY-1

## 2023-02-13 NOTE — PROGRESS NOTES
"Cardiology Progress Note     Changes Today:   - Continue PO Torsemide 100mg daily  - Goal net even  - TTE  - Decrease LVAD speed to 5800    Subjective:  Care team notes reviewed. Overnight patient noted \"popping\" sensation in chest without any associated chest pain, palpitations, shortness of breath, lightheadedness. States this has occurred once before when being diuresed and further workup revealed he was dry. Bedside US confirmed small and collapsable IVC. Discontinued Bumex drip and stated patient on PO torsemide this AM. Patient states he feels well. No additional popping sensations. Denies chest pain, shortness of breath, increasing fluid in legs. No other concerns to address with the team this morning. No alarms on LVAD.    Physical Exam   Temp: 97.8  F (36.6  C) Temp src: Oral BP: 100/73 Pulse: 60   Resp: 16 SpO2: 97 % O2 Device: None (Room air)      Vital Signs with Ranges  Temp:  [97.5  F (36.4  C)-97.9  F (36.6  C)] 97.8  F (36.6  C)  Pulse:  [59-67] 60  Resp:  [16] 16  BP: ()/(53-73) 100/73  SpO2:  [95 %-97 %] 97 %    LVAD Settings  Heartmate 3 LEFT VS  Flow (Lpm): 5.3 Lpm  Pulse Index (PI): 3.6 PI  Speed (rpm): 6000 rpm  Power (muhammad): 5.1 muhammad  Current Hct settin    Intake/Output    Intake/Output Summary (Last 24 hours) at 2023 0645  Last data filed at 2023 0500  Gross per 24 hour   Intake 1840 ml   Output 4575 ml   Net -2735 ml     Weight  Vitals:    23 0500 02/10/23 0410 23 0408 23 0400   Weight: 91.9 kg (202 lb 9.6 oz) 86.9 kg (191 lb 8 oz) 83.8 kg (184 lb 12.8 oz) 80.6 kg (177 lb 12.8 oz)    23 0430   Weight: 80.2 kg (176 lb 11.2 oz)       Resp: 16        allopurinol  200 mg Oral or Feeding Tube Daily     amiodarone  200 mg Oral Daily     amLODIPine  10 mg Oral Daily     aspirin  81 mg Oral Daily     DAPTOmycin (CUBICIN) intermittent infusion  6 mg/kg (Adjusted) Intravenous Q24H     diclofenac  4 g Topical 4x Daily     empagliflozin  10 mg Oral Daily " "    ferrous sulfate  325 mg Oral Daily with breakfast     finasteride  5 mg Oral Daily     FLUoxetine  30 mg Oral Daily     hydrALAZINE  100 mg Oral TID     isosorbide mononitrate  90 mg Oral Daily     levothyroxine  88 mcg Oral QAM AC     magnesium oxide  400 mg Oral or Feeding Tube BID     multivitamin RENAL  1 tablet Oral Daily     pantoprazole  40 mg Oral Daily     potassium chloride ER  40 mEq Oral BID     sodium chloride (PF)  3 mL Intracatheter Q8H     spironolactone  25 mg Oral Daily     tamsulosin  0.8 mg Oral Daily     torsemide  100 mg Oral TID     Warfarin Therapy Reminder  1 each Oral See Admin Instructions        , Blood pressure 100/73, pulse 60, temperature 97.8  F (36.6  C), temperature source Oral, resp. rate 16, height 1.702 m (5' 7\"), weight 80.2 kg (176 lb 11.2 oz), SpO2 97 %.     Constitutional: awake, alert, cooperative, no apparent distress, and appears stated age  Eyes: sclera clear, conjunctiva normal, EOMI  Respiratory: No increased work of breathing, good air exchange.  Cardiovascular: LVAD hum. JVD to 10cm.  Skin: no bruising or bleeding on exposed skin  Extremities: Trace edema to mid shins bilaterally  Neurologic: Awake, alert, oriented to name, place and time. No focal deficits.       Data   Recent Labs   Lab Test 02/12/23  1711 02/12/23  1135 02/12/23  0548     --  136   POTASSIUM 4.1 4.2 3.3*   CHLORIDE 92*  --  94*   CO2 31*  --  30*   ANIONGAP 13  --  12   *  --  127*   BUN 44.5*  --  43.6*   CR 1.78*  --  1.68*   CALOS 9.4  --  8.6*       Lab Results   Component Value Date    WBC 8.9 02/12/2023    WBC 7.9 03/19/2021     Lab Results   Component Value Date    RBC 4.65 02/12/2023    RBC 3.88 03/19/2021     Lab Results   Component Value Date    HGB 12.3 02/12/2023    HGB 10.1 03/19/2021     Lab Results   Component Value Date    HCT 40.2 02/12/2023    HCT 32.2 03/19/2021     No components found for: MCT  Lab Results   Component Value Date    MCV 87 02/12/2023    MCV 83.0 " 03/19/2021     Lab Results   Component Value Date    MCH 26.5 02/12/2023    MCH 26.0 03/19/2021     Lab Results   Component Value Date    MCHC 30.6 02/12/2023    MCHC 31.4 03/19/2021     Lab Results   Component Value Date    RDW 18.1 02/12/2023    RDW 22.8 03/19/2021     Lab Results   Component Value Date     02/12/2023     03/19/2021     03/17/2021        Liver Function Studies -   Recent Labs   Lab Test 02/07/23  1826   PROTTOTAL 7.0   ALBUMIN 3.3*   BILITOTAL 0.5   ALKPHOS 310*   *   ALT 84*       Assessment & Plan       67 y.o. M with PMH of HFrEF secondary to NICM s/p HM3 LVAD, recurrent driveline infections, HTN, hypothyroid presents with increased weight gain, fatigue concerning for acute on chronic heart failure exacerbation.     Acute on chronic systolic heart failure/HFrEF secondary to NICM s/p HM3 LVAD as DT on 2/2020. RHC 12/12/22: RA 9, mPA 22, PCW 13.  Stage D. NYHA Class II-III.  CAD  Clinically appears hypervolemic on admission. On admission MAP 90s, . HM3 speed 5800 rpm.  Patient LVAD speed previously 5500 and increased in 9/2022 with Dr. Cisneros. Patient has had multiple hospital admissions with acute on chronic heart failure exacerbations since change. TTE on 2/8 with opening of aortic valve with each beat indicating LV not unloaded enough, thus will increase speed.  - Fluid status: hypervolemic              - PTA dose Bumex 6mg BID with hydrochlorothiazide 75mg MWS  - Afterload reduction:               - hydralazine 100 mg TID              - Imdur 90 mg every day  -GDMT   - BB contraindicated due to bradycardia and RV failure per prior notes   - Aldosterone antagonist: Spironolactone 25mg daily   - Statin: held at last admission due to c/f hepatic injury   - SGLT2i: dapagliflozin 5mg QD  - Antiplatelet:  ASA 81mg QD  - Anticoagulation: warfarin per pharmacy goal INR 1.7-2.3  - SCD prophylaxis: ICD  - Remote monitoring: CardioMEMs    Plan:  - Discontinue Bumex   -  Start Torsemide 100mg TID  - Decrease LVAD speed to 5800  - TTE prior to discharge to assess septum and LV function at increased LVAD speed  - Strict I/O, daily weights, K and Mg replacement     Recurrent driveline infections MRSA/MSSA, 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   - Continue daptomycin 6mg/kg QD  - Will need weekly CK, CBC, CMP while on this medication  - Wound consult for wound vac dressing change  - CT Surgery consult for wound evaluation     HTN  - Continue amlodipine 10mg QD, hydralazine 100mg TID, Imdur 90mg QD     Atrial fibrillation. Slow VT.  - Continue PTA warfarin, pharmacy to dose  - Continue PTA amiodarone 200mg daily     # Chronic Problems  # Gout: allopurinol 200mg QD  # T2DM: dapagliflozin 5mg QD  # GERD: omeprazole 20mg QD  # Hypothyroid: levothyroxine 88mg QD  # BPH: finasteride 5mg QD, tamsulosin 0.8mg QD  # Mood disorder: fluoxetine 30mg QD  # Insomnia: zolpidem 5mg QHS PRN     FEN: 2L fluid restriction, monitor and replete lytes, cardiac diet  Prophylaxis: warfarin  Code Status: FULL    This patient was seen and discussed with Dr. Jiang who agrees with the above assessment and plan.     Ruth Zacarias MD  Internal Medicine PGY-1

## 2023-02-13 NOTE — PLAN OF CARE
"67 year old male with a history of HFrEF secondary to NICM s/p HM3 LVAD, recurrent driveline infections s/p I&D on 12/11/2022 with Dr. Jaramillo, HTN, hypothyroid presents with increased weight gain, fatigue admitted with acute on chronic heart failure exacerbation. Last CT was previous to last Driveline I&D, currently has a wound vac and undergoing MWF vac changes without increase drainage. CV Surgery was consulted to assess wound.      Neuro: AOx4, denies headache, lightheadedness and dizziness. Arthritis stiffness/pain in hands getting better  Resp: RA >92%. Denies SOB at rest, LS clear and equal bilaterally  Cardiac: Paced rhythm 60-70s, denies chest pain and palpitations. MAP's mildly elevated on doppler. LVAD numbers WDL and no alarms.   GI/: 2g Na and 2L FR, denies N/V. Voiding independently in bedside urinal. BM this shift per pt report.  Skin: Generalized bruising and scabbing, no overt deficits noted. LVAD dressing CDI - changed today due to packing being misplaced and not covering wound, still to be changed MWF.  Pain: Arthritis stiffness/pain in hands - scheduled Voltaren gel being used  Activity: Independent in room  Electrolytes: Replaced K+ 1x this shift, redraws in AM.  LDAs: L DL Midline SL and WDL     Multiple diuretics discontinued this shift, transitioned to PO Torsemide 100mg TID. Pt has history of being overdiuresed and has had \"chest pops\" that do not cause pain d/t dehydration, EKG ordered and Bumex gtt discontinued.  Will continue to monitor and report changes to team.    "

## 2023-02-13 NOTE — PLAN OF CARE
"D: 67 year old male with a history of HFrEF secondary to NICM s/p HM3 LVAD, recurrent driveline infections s/p I&D on 12/11/2022 with Dr. Jaramillo, HTN, hypothyroid presents with increased weight gain, fatigue admitted with acute on chronic heart failure exacerbation. Last CT was previous to last Driveline I&D, currently has a wound vac and undergoing MWF vac changes without increase drainage. CV Surgery was consulted to assess wound. Wound vac removed 2/10/23 and CVTS signed off.      I: Monitored vitals and assessed pt status.   LDA: midline catheter DL L basilic  PRN: Ambien x1  Tele: V paced   O2: RA while awake; home CPAP while asleep  Mobility: Independent      A: A0x4. VSS. LVAD numbers WDL with no alarms. Doppler MAPS's WDL/slightly elevated (70's-90's). Afebrile. Denied \"chest pops\" (Pt has history of being overdiuresed and has had \"chest pops\" from that). Denied HA, CP, palpitations, numbness/tingling, nausea, SOB/MIRANDA, dizziness/lightheadedness, or pain. Arthritis stiffness/pain in hands, given scheduled voltaren gel. 2g Na diet with 2L FR. Urinating adequately via urinal. No BM reported this shift, LBM 2/12/23. Driveline dressing CDI, change MWF (with hydrofera blue tegaderm). See flowsheets for skin assessments. BLE edema improving. Appeared to sleep well overnight.      P: Continue to monitor Pt status and report changes to Cards 2. Transition to torsemide 3x daily starting today.        Shift [id number]      "

## 2023-02-13 NOTE — PROGRESS NOTES
Care Management Follow Up    Length of Stay (days): 6    Expected Discharge Date: 02/14/2023     Concerns to be Addressed: Discharge planning  Patient plan of care discussed at interdisciplinary rounds: Yes    Anticipated Discharge Disposition: Home     Anticipated Discharge Services: Outpatient infusion, outpatient wound care  Anticipated Discharge DME: None    Education Provided on the Discharge Plan: Yes  Patient/Family in Agreement with the Plan: Yes    Additional Information:  Per MD, pt may be medically ready for discharge home tomorrow. This writer called to update pt's outpatient infusion center.    OT recommending discharge to home with assist.     [facility]   Phone: [phone]   Fax: [phone]     Spoke with pharmacist- Kay who confirmed they can resume pt's daily daptomycin infusions on Weds 2/15 at pt's previously scheduled time.    Spoke with wound nurse, Latha and updated her that wound vac has now been removed. Pt now has hydrofera blue dressing place with plan to change M/W/F. Latha confirmed they can continue to provide outpt wound care on M/W/F and they have hydrofera blue dressings available.     CC will continue to monitor patient's medical condition and progress towards discharge.  Carmita Farnsworth RN BSN  6C Unit Care Coordinator  Phone number: [phone]  Pager: [phone]

## 2023-02-14 NOTE — PLAN OF CARE
DX: heart failure exacerbation   PMH:   HFrEF secondary to NICM s/p HM3 LVAD, recurrent driveline infections, HTN, hypothyroid      Code Status: full  Team: cards 2    Cardiac: Paced, VSS, LVAD #WDL, pt tolerating speed change  Respiratory: clear lung sounds   Neuro: AxO x4  Pain: denies  GI/: urinates adequately, had bm x1 in the am  Diet: 2L fluid restriction   Skin: driveline dressing CDI. Next dressing due on 2/15  Activity: independent in room     Gtts/fluid: intermittent abx infusions     Plan: discharge home today

## 2023-02-14 NOTE — PLAN OF CARE
DX: heart failure exacerbation   PMH:   HFrEF secondary to NICM s/p HM3 LVAD, recurrent driveline infections, HTN, hypothyroid      Code Status: full  Team: cards 2    Cardiac: Paced, VSS, LVAD #WDL, LVAD speed changed from 5000 to 4800   Respiratory: clear lung sounds   Neuro: AxO x4  Pain: denies  GI/: urinates adequately, had regular bms   Diet: 2L fluid restriction   Skin: driveline site oozing slightly. Next dressing due on 2/15  Activity: independent in room     Gtts/fluid: intermittent abx infusions     Plan: possible discharge home tomorrow

## 2023-02-14 NOTE — DISCHARGE SUMMARY
Formerly [facility]   Cardiology Discharge Summary     Eliseo Tanner MRN# [id number]   YOB: 1955 Age: 67 year old     DATE OF ADMISSION:  2/7/2023  DATE OF DISCHARGE: 2/14/2023  ADMITTING PROVIDER: Daniel Gomes MD  DISCHARGE PROVIDER: Daniel Gomes  PRIMARY PROVIDER: Jay Steele    Discharge Diagnoses:  1. Acute on chronic systolic heart failure/HFrEF secondary to NICM s/p HM3 LVAD as DT on 2/2020  2. Recurrent driveline infection  3. Hypertension  4. Atrial fibrillation      Pertinent Procedures:  1. Echo    Consults:  - Wound care  - CVTS    Imaging with results:  Echocardiogram 2/13/2023:  HM3 at 6000RPM.  LVAD cannula was seen in the expected anatomic position in the LV apex.  The LV is normal in size LVIDd 38 mm.  Aortic valve remains closed during the cardiac cycle, mild aortic  regurgitation.  Normal inflow and outflow velocities.  Mild tricuspid insufficiency is present.  Global right ventricular function is severely reduced.  No pericardial effusion is present.      Echocardiogram 2/8/2023   LVAD cannula was seen in the expected anatomic position in the LV apex.  HM3 at 5800RPM.  LVIDd 49mm.  Abnormal septal motion due to paced thythm.  Aortic valve open partially with each systole. Mild AI.  Normal flow velocities.  Moderate tricuspid insufficiency is present.  Global right ventricular function is moderately to severely reduced.  IVC diameter >2.1 cm collapsing <50% with sniff suggests a high RA pressure  estimated at 15 mmHg or greater.  No pericardial effusion is present.      Brief HPI:  67 y.o. M with PMH of HFrEF secondary to NICM s/p HM3 LVAD, recurrent driveline infections, HTN, hypothyroid presents with increased weight gain, fatigue concerning for acute on chronic heart failure exacerbation.    Hospital Course by Diagnosis:    Acute on chronic systolic heart failure/HFrEF secondary to NICM s/p HM3 LVAD as DT on 2/2020. RHC 12/12/22: RA 9, mPA 22, PCW 13.  Stage D.  "NYHA Class II-III.  CAD    Diuresed on bumex drip. Discharge weight is 177 lbs.      Plan on discharge:  - Volume status: euvolemic. Torsemide 100 TID with PRN thiazide (changed from scheduled). Follow up with [facility] monthly for 4 months and with Dr. Cisneros. Needs BMP in a week. LVAD speed increased from 5800 to 6000 with decreased LV cavity size and AV valve not opening, decreased back to 5800 before discharge.    - Afterload reduction:               - hydralazine 100 mg TID              - Imdur 120 mg every day  -GDMT              - BB contraindicated due to bradycardia and RV failure per prior notes              - Aldosterone antagonist: Spironolactone 25mg daily              - Statin: held at last admission due to c/f hepatic injury              - SGLT2i: dapagliflozin 5mg QD  - Antiplatelet:  ASA 81mg QD  - Anticoagulation: warfarin per pharmacy goal INR 1.7-2.3  - SCD prophylaxis: ICD  - Remote monitoring: CardioMEMs          Recurrent driveline infections MRSA/MSSA, 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   - Continue daptomycin 6mg/kg QD  - Will need weekly CK, CBC, CMP while on this medication  - Wound consult follow up.  - ID follow up.      PENDING RESULTS:   None  Need BMP in a week    MEDICATION CHANGES:  Spirolactone 12.5  Torsemide 100 TID instead of bumex  Change hydrochlorothiazide to PRN  Decrease potassium from 80 BID to 60 BID    PHYSICAL EXAM:  Blood pressure 100/86, pulse 63, temperature 97.7  F (36.5  C), temperature source Axillary, resp. rate 16, height 1.702 m (5' 7\"), weight 80.3 kg (177 lb 1.6 oz), SpO2 95 %.  GENERAL: Appears alert and oriented times three.   NECK: Supple and without lymphadenopathy. JVP 8 cm.   CV: S1/S2 heard without murmur. + LVAD hum  RESPIRATORY: Respirations regular, even, and unlabored. Normal breath sounds.   GI: Soft and non distended with normoactive bowel sounds present in all quadrants. No tenderness, rebound, guarding. No organomegaly.   EXTREMITIES: " No peripheral lower extremity edema.      LABS:   Last CBC:   Recent Labs   Lab Test 23   WBC 9.6   RBC 4.80   HGB 12.7*   HCT 41.2   MCV 86   MCH 26.5   MCHC 30.8*   RDW 17.8*          Last CMP:  Recent Labs   Lab Test 23  0721 23  1826      < > 137   POTASSIUM 3.7   < > 3.5   CHLORIDE 94*   < > 102   CALOS 8.7*   < > 8.1*   CO2 31*   < > 23   BUN 43.4*   < > 45.3*   CR 1.71*   < > 1.46*   *   < > 149*   AST  --   --  122*   ALT  --   --  84*   BILITOTAL  --   --  0.5   ALBUMIN  --   --  3.3*   PROTTOTAL  --   --  7.0   ALKPHOS  --   --  310*    < > = values in this interval not displayed.       IMAGING:  Results for orders placed or performed during the hospital encounter of 23   Echocardiogram Limited     Value    LVEF  <20%    [facility]    [id number]  [id number]  [id number]  [id number]^SUSY^GABE     [facility]  Echocardiography Laboratory  [address] [place] [id number]     Name: WOLFGANG LEACH  MRN: [id number]  : 1955  Study Date: 2023 11:37 AM  Age: 67 yrs  Gender: Male  [place] [facility] [place]  Reason For Study: CHF  Ordering Physician: GABE JAIN  Performed By: Rosa Bosch     BSA: 2.0 m2  Height: 67 in  Weight: 206 lb  ______________________________________________________________________________  Procedure  Limited Portable Echo Adult. LVAD Echocardiogram with two-dimensional, color  and spectral Doppler performed.  ______________________________________________________________________________  Interpretation Summary  LVAD cannula was seen in the expected anatomic position in the LV apex.  HM3 at 5800RPM.  LVIDd 49mm.  Abnormal septal motion due to paced thythm.  Aortic valve open partially with each systole. Mild AI.  Normal flow velocities.  Moderate tricuspid insufficiency is present.  Global right ventricular function is moderately to severely reduced.  IVC diameter >2.1 cm  collapsing <50% with sniff suggests a high RA pressure  estimated at 15 mmHg or greater.  No pericardial effusion is present.  ______________________________________________________________________________  Left Ventricle  The visual ejection fraction is <20%. LVAD cannula was seen in the expected  anatomic position in the LV apex. HM3 at 5800RPM.  LVIDd 49mm.  Abnormal septal motion due to paced thythm.  Aortic valve open partially with each systole. Mild AI.  Normal flow velocities.     Right Ventricle  Global right ventricular function is moderately to severely reduced. A  pacemaker lead is noted in the right ventricle.     Mitral Valve  The mitral valve is normal. Mild mitral insufficiency is present.     Tricuspid Valve  Moderate tricuspid insufficiency is present. The right ventricular systolic  pressure is approximated at 16.6 mmHg plus the right atrial pressure.  Pulmonary artery systolic pressure is normal.     Pulmonic Valve  The pulmonic valve is normal.     Vessels  IVC diameter >2.1 cm collapsing <50% with sniff suggests a high RA pressure  estimated at 15 mmHg or greater.     Pericardium  No pericardial effusion is present.     ______________________________________________________________________________  Doppler Measurements & Calculations  TR max saumya: 204.0 cm/sec  TR max P.6 mmHg  RVSP(TR): 19.6 mmHg     ______________________________________________________________________________  Report approved by: Graciela Tomlinson 2023 12:53 PM         Echo Limited     Value    LVEF  10-20% (severely reduced)    [facility]    [id number]  [id number]  [id number]  [id number]^SUSY^[facility]  Echocardiography Laboratory  [address] [place] [id number]     Name: WOLFGANG LEACH  MRN: [id number]  : 1955  Study Date: 2023 01:41 PM  Age: 67 yrs  Gender: Male  [place] [facility] [place]  Reason For Study: CHF  Ordering Physician: SUSY  GABE  Performed By: Jose Ledbetter     BSA: 1.9 m2  Height: 67 in  Weight: 176 lb  HR: 61  BP: 96/84 mmHg  ______________________________________________________________________________  Procedure  Limited Portable Echo Adult. TDS - no apical window seen.  ______________________________________________________________________________  Interpretation Summary  HM3 at 6000RPM.  LVAD cannula was seen in the expected anatomic position in the LV apex.  The LV is normal in size LVIDd 38 mm.  Aortic valve remains closed during the cardiac cycle, mild aortic  regurgitation.  Normal inflow and outflow velocities.  Mild tricuspid insufficiency is present.  Global right ventricular function is severely reduced.  No pericardial effusion is present.  ______________________________________________________________________________  Left Ventricle  Left ventricular function is decreased. The ejection fraction is 10-20%  (severely reduced).     Vessels  IVC diameter and respiratory changes fall into an intermediate range  suggesting an RA pressure of 8 mmHg.     Compared to Previous Study  This study was compared with the study from 2023 . LV cavity is smaller,  aortic valve remains closed during the cardiac cycle.     ______________________________________________________________________________  MMode/2D Measurements & Calculations  IVSd: 1.4 cm  LVIDd: 3.7 cm  LVPWd: 1.3 cm  LV mass(C)d: 181.2 grams  LV mass(C)dI: 94.6 grams/m2  RWT: 0.72     Doppler Measurements & Calculations  TR max saumya: 206.6 cm/sec  TR max P.1 mmHg     ______________________________________________________________________________  Report approved by: MD Virgilio Lujan 2023 02:34 PM           *Note: Due to a large number of results and/or encounters for the requested time period, some results have not been displayed. A complete set of results can be found in Results Review.           DISCHARGE MEDICATIONS:  Current Discharge  Medication List      START taking these medications    Details   spironolactone (ALDACTONE) 25 MG tablet Take 1 tablet (25 mg) by mouth daily for 90 days  Qty: 90 tablet, Refills: 0    Associated Diagnoses: LVAD (left ventricular assist device) present (H); 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)      torsemide (DEMADEX) 100 MG tablet Take 1 tablet (100 mg) by mouth 3 times daily for 90 days  Qty: 270 tablet, Refills: 0    Associated Diagnoses: LVAD (left ventricular assist device) present (H); 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)         CONTINUE these medications which have CHANGED    Details   hydrochlorothiazide (HYDRODIURIL) 25 MG tablet Take as instruction by the VAD clinic  Qty: 118 tablet, Refills: 3    Associated Diagnoses: Chronic systolic congestive heart failure (H)      isosorbide mononitrate (IMDUR) 120 MG 24 HR ER tablet Take 1 tablet (120 mg) by mouth daily for 90 days  Qty: 90 tablet, Refills: 0    Associated Diagnoses: Acute on chronic systolic congestive heart failure (H); LVAD (left ventricular assist device) present (H)      levothyroxine (SYNTHROID/LEVOTHROID) 88 MCG tablet Take 1 tablet (88 mcg) by mouth every morning (before breakfast)    Associated Diagnoses: Hypothyroidism, unspecified type      potassium chloride ER (KLOR-CON M) 20 MEQ CR tablet Take 3 tablets (60 mEq) by mouth 2 times daily for 90 days  Qty: 540 tablet, Refills: 0    Associated Diagnoses: LVAD (left ventricular assist device) present (H); 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)         CONTINUE these medications which have NOT CHANGED    Details   allopurinol (ZYLOPRIM) 100 MG tablet 2 tablets (200 mg) by Oral or Feeding Tube route daily  Qty: 60 tablet, Refills: 1    Associated Diagnoses: LVAD (left ventricular assist device) present (H)      amiodarone (PACERONE) 200 MG tablet TAKE 1 TABLET BY MOUTH ONCE DAILY  Qty: 90 tablet, Refills: 3    Associated Diagnoses: LVAD  (left ventricular assist device) present (H)      amLODIPine (NORVASC) 5 MG tablet Take 2 tablets (10 mg) by mouth daily  Qty: 180 tablet, Refills: 3    Associated Diagnoses: Chronic systolic congestive heart failure (H)      aspirin (ASA) 81 MG EC tablet Take 1 tablet (81 mg) by mouth daily  Qty: 90 tablet, Refills: 3    Associated Diagnoses: LVAD (left ventricular assist device) present (H)      B Complex-C-Folic Acid (RENAL) 1 MG CAPS Take 1 capsule by mouth daily  Qty: 30 capsule, Refills: 0    Comments: Further refills should go to primary care or nephrology  Associated Diagnoses: Chronic systolic congestive heart failure (H)      co-enzyme Q-10 200 MG CAPS 200 mg by Oral or Feeding Tube route daily      dapagliflozin (FARXIGA) 5 MG TABS tablet Take 5 mg by mouth daily      DAPTOmycin (CUBICIN) 500 MG injection Inject 500 mg into the vein daily      ferrous sulfate (FEROSUL) 325 (65 Fe) MG tablet Take 325 mg by mouth daily (with breakfast)      finasteride (PROSCAR) 5 MG tablet Take 1 tablet (5 mg) by mouth daily Helps with urinary retention.  Qty: 30 tablet, Refills: 0    Associated Diagnoses: Voiding difficulty      FLUoxetine (PROZAC) 10 MG capsule Take 30 mg by mouth daily      hydrALAZINE (APRESOLINE) 100 MG tablet Take 1 tablet (100 mg) by mouth 3 times daily  Qty: 270 tablet, Refills: 3    Associated Diagnoses: Chronic systolic congestive heart failure (H)      magnesium oxide (MAG-OX) 400 MG tablet 1 tablet (400 mg) by Oral or Feeding Tube route 2 times daily  Qty: 30 tablet, Refills: 1    Associated Diagnoses: LVAD (left ventricular assist device) present (H)      omeprazole (PRILOSEC) 20 MG DR capsule Take 20 mg by mouth daily      senna-docusate (SENOKOT-S/PERICOLACE) 8.6-50 MG tablet Take 1 tablet by mouth 2 times daily as needed for constipation      tamsulosin (FLOMAX) 0.4 MG capsule Take 0.8 mg by mouth every evening This med helps with urinary retention  Qty: 30 capsule, Refills: 0    Associated  Diagnoses: Voiding difficulty      warfarin ANTICOAGULANT (COUMADIN) 2 MG tablet Take 4 mg by mouth daily      zolpidem (AMBIEN) 5 MG tablet Take 5 mg by mouth nightly as needed for Insomnia         STOP taking these medications       bumetanide (BUMEX) 2 MG tablet Comments:   Reason for Stopping:               DISCHARGE DISPOSITION: Eliseo Tanner will discharge to home in stable condition.     DISCHARGE INSTRUCTIONS:  Discharge Procedure Orders   Follow Up and recommended labs and tests   Order Comments: [facility]   Phone: [phone]   Fax: [phone]     For resumption of outpatient IV antibiotics. Resume per normal schedule on Weds 2/15/2023    For resumption of outpatient wound care- Monday/Wedneday/Friday, next due on Weds 2/15/2023     Reason for your hospital stay   Order Comments: You were admitted for worsening heart failure. You were diuresed well and your diuresis regimen was modified as below.  Please follow up with Karolina ([facility] clinic) and Dr. Cisneros as scheduled.     Activity   Order Comments: Your activity upon discharge: activity as tolerated     Order Specific Question Answer Comments   Is discharge order? Yes      Adult [facility] Follow-up and recommended labs and tests   Order Comments: Follow up with [facility] clinic as scheduled. You need a blood test for your kidneys in a week.  Follow up with infectious disease regarding your daptomycin.  Wound care as below    Appointments on [place] and/or [facility] (with [facility] or [facility] provider or service). Call [phone] if you haven't heard regarding these appointments within 7 days of discharge.     Wound care and dressings   Order Comments: Wound location: L) chest   Change Days: Mon/Wed/Fri   Cleanse wound bed with NS and pat dry.  Cut a piece of Hydrofera blue (#[id number]), moisten with NS, wring the excess amount and place it on open area. Cover with Tegaderm dressing.  If has more drainage, cover with Mepilex dressing.      Diet   Order Comments: Follow this diet upon discharge: Orders Placed This Encounter      Fluid restriction 2000 ML FLUID      2 Gram Sodium Diet     Order Specific Question Answer Comments   Is discharge order? Yes        60 minutes spent in discharge, including >50% in counseling and coordination of care, medication review and plan of care recommended on follow up. Questions were answered.   Jay Olivo  (PCP) was contacted at the time of discharge, so as to bridge from hospital to outpatient care.     It was our pleasure to care for Eliseo Tanner during this hospitalization. Please do not hesitate to contact me should there be questions regarding the hospital course or discharge plan.      Alexy Gomez MD  Cardiovascular disease fellow  [facility]  [phone]  02/14/2023 11:19 AM

## 2023-02-14 NOTE — PHARMACY-ANTICOAGULATION SERVICE
Clinical Pharmacy- Warfarin Discharge Note  This patient is currently on warfarin for the treatment of LVAD.  INR Goal= 1.7-2.3  Expected length of therapy lifetime.    Warfarin PTA Regimen: 4mg daily      Anticoagulation Dose History     Recent Dosing and Labs Latest Ref Rng & Units 2/8/2023 2/9/2023 2/10/2023 2/11/2023 2/12/2023 2/13/2023 2/14/2023    Warfarin 3 mg - 3 mg 3 mg - - 3 mg 3 mg -    Warfarin 4 mg - - - 4 mg 4 mg - - -    [id number] 70 - 130 % - - - - - - -    INR 0.85 - 1.15 2.44(H) 2.49(H) 2.35(H) 2.39(H) 2.26(H) 2.43(H) 2.36(H)          Vitamin K doses administered during the last 7 days: 0  FFP administered during the last 7 days: 0  See provider discharge orders for dosing recommendations.   It is recommended that the patient has their INR checked in the next 1-2 days.    Angelia Lopez RPH on 2/14/2023 at 2:35 PM

## 2023-02-14 NOTE — PLAN OF CARE
Hours of care 1900 - 0700    Temp: 97.7  F (36.5  C) Temp src: Oral BP: 100/86 Pulse: 59   Resp: 18 SpO2: 96 % O2 Device: None (Room air)       Pt has a history of HFrEF secondary to NICM s/p HM3 LVAD, HTN, and hypothyroid.     Pt is A&O x4. VSS. On room air. Paced, LVAD within defined parameters, no alarms. Doppler MAP's WDL. Lung sounds diminished, denies SOB. Denies pain. Given 4g Voltaren gel for stiffness in hands. Last BM on 2/12. Drive line dressing C/D/I. Next dressing change due on 2/15. RN managed Mg and K+.  Up independently. Urinating adequately. Pt refused CPAP. Weight taken. Will continue plan of care.

## 2023-02-14 NOTE — PROGRESS NOTES
Care Management Discharge Note    Discharge Date: 02/14/2023     Anticipated Discharge Disposition: Home     Anticipated Discharge Services: Outpatient infusion, outpatient wound care  Anticipated Discharge DME: None     Education Provided on the Discharge Plan: Yes  Patient/Family in Agreement with the Plan: Yes    Additional Information:  Per MD, pt medically ready for discharge home today. This writer called and updated Shasta at [facility] in [place], [place]. They will plan for pt to return tomorrow morning for his daily IV daptomycin infusion and wound care. This writer had confirmed with RN at [facility] yesterday that they have the dressing supplies for pt's wound, bedside RN also sending supplies for pt to bring with him tomorrow just in case.     Met with pt and wife, no concerns about discharging home today and resuming outpatient services tomorrow morning.     [facility] [place]   Phone: [phone]   Fax: [phone]   Faxed discharge orders and summary with resumption orders for IV daptomycin and new wound care orders.    CC will continue to monitor patient's medical condition and progress towards discharge.  Carmita Farnsworth RN BSN  6C Unit Care Coordinator  Phone number: [phone]  Pager: [phone]

## 2023-02-15 NOTE — PLAN OF CARE
Occupational Therapy Discharge Summary    Reason for therapy discharge:    Discharged to home.    Progress towards therapy goal(s). See goals on Care Plan in [facility] electronic health record for goal details.  Goals partially met.  Barriers to achieving goals:   discharge from facility.    Therapy recommendation(s):    Continue home exercise program.

## 2023-02-16 NOTE — PROGRESS NOTES
D:  Pt called to report the stitch on his Driveline from his I&D is no longer attached to his skin.  Just attached to the driveline.  Reports no suture appears to be in his skin.  Pt reports no issue with the driveline.  No redness or drainage.  I:  Instructed pt to take a picture and send via [employer].  A:  Pt verbalized understanding.  P:  Pt to RTC 3/2.

## 2023-02-20 NOTE — PROGRESS NOTES
[facility] - CardioMEMS Reading Review    October 20, 2022   Cardiomems period: 1/18/23 - 2/16/23    CardioMEMS reviewed and routed to patient's provider, Laine BARRY NP.     Current diuretic dose:Torsemide 100 TID with PRN Hydrochlorothiazide, Spironolactone 12.5mg daily  Current potassium chloride dose:  Potassium 60mEq BID     Symptoms: None  Weights: 178 today and 177 yesterday. Pt discharged from the hospital 2/14    Recent plan of care changes: Bumex and Hydrochlorothiazide were increased without improvements, thus patient was admitted for hypervolemia.  Pt then transitioned to Torsemide from Bumex and PRN Hydrochlorothiazide    Current Threshold parameters: 21 - 24    Today's Waveform:       Reading Ranges:           RHC Date Wedge Pressure MEMS PAD during cath  (average of the 3 values)     Sept 20, 2022 w/MEMS implant     15 mmHg   16 mmHg

## 2023-02-21 NOTE — PROGRESS NOTES
ANTICOAGULATION MANAGEMENT     Eliseo Tanner 67 year old male is on warfarin with therapeutic INR result. (Goal INR 1.7-2.3)    Recent labs: (last 7 days)     02/21/23  1118   INR 1.9*       ASSESSMENT       Source(s): Chart Review and Patient/Caregiver Call       Warfarin doses taken: Warfarin taken as instructed    Diet: No new diet changes identified    New illness, injury, or hospitalization: Yes: Hospital Admission on 2/7-2/14/23 for acute on chronic CHF. Diuresed with modifications made to diuretic regimen. Wound vac removed.    Medication/supplement changes: started Aldactone and Demadex, continues on Daptomycin    Signs or symptoms of bleeding or clotting: No    Previous INR: Supratherapeutic    Additional findings: None       PLAN     Recommended plan for ongoing change(s) affecting INR     Dosing Instructions: Continue your current warfarin dose with next INR in 1 week       Summary  As of 2/21/2023    Full warfarin instructions:  4 mg every day   Next INR check:  3/2/2023             Telephone call with Eliseo who verbalizes understanding and agrees to plan and who agrees to plan and repeated back plan correctly    Check at provider office visit-INR added to lab note    Education provided:     Contact [phone] with any changes, questions or concerns.     Plan made per [facility] anticoagulation protocol and per LVAD protocol    PACO MARQUEZ RN  Anticoagulation Clinic  2/21/2023    _______________________________________________________________________     Anticoagulation Episode Summary     Current INR goal:  1.7-2.3   TTR:  42.4 % (10.7 mo)   Target end date:  Indefinite   Send INR reminders to:  ANTICOAG LVAD    Indications    LVAD (left ventricular assist device) present (H) [Z95.811]  Chronic systolic congestive heart failure (H) [I50.22]  Paroxysmal atrial fibrillation (H) [I48.0]           Comments:  Follow VAD Anticoag protocol:Yes: HeartMate 3   Bridging: Call MD each time   Date VAD placed:  2/18/2020 5/6/22 [employer] [facility]         Anticoagulation Care Providers     Provider Role Specialty Phone number    Nader Cisneros MD Referring Cardiovascular Disease [phone]

## 2023-02-22 NOTE — PROGRESS NOTES
[facility] - CardioMEMS Reading Review    February 22, 2023, 1005   CardioMems period: 2/17/23 -  3/19/2023      CardioMEMS reviewed and routed to patient's provider, Laine BARRY NP.     Current diuretic:  Torsemide 100 TID with PRN Hydrochlorothiazide, Spironolactone 12.5mg daily  Current potassium chloride dose:  Potassium 60mEq BID    Symptoms: None. Pt denies all symptoms of heart failure.  Weights: Today, going back: 186, (Pt endorses celebratory salty meal last night), 183, 183, 182, 180, 178, 177     Changes to plan of care today: None.  Pt instructed to call VAD coordinator if weights remain over 185 for 2 days in a row, or continue to trend up.    Current Threshold parameters: 21 - 24    Today's Waveform:       Readings:         [facility] Date Wedge Pressure MEMS PAD during cath  (average of the 3 values)     Sept 20, 2022 w/MEMS implant     15 mmHg   16 mmHg

## 2023-02-24 NOTE — PROGRESS NOTES
Remote monitoring of Pulmonary Artery Pressures via CardioMEMS device reviewed at least weekly and as needed with nursing for dates 1/17/2023 through 2/16/23. Diuretics adjusted accordingly.   Laine Leon, APRN CNP  2/24/2023

## 2023-02-27 NOTE — TELEPHONE ENCOUNTER
Spoke to Gabriela PETERSON and they will add the patient on to Infusion schedule at 10 on 3/2 for infusion.     Will enter into therapy plan and patient is aware to be at [facility] at 10.    OPAT Start 12/18/2022   Current Medication #1 Daptomycin   Med #1 Dose 500 mg (~6 mg/kg ActBW)       Vinnie Olivera RN  Infectious Disease on 2/27/2023 at 3:46 PM

## 2023-03-02 NOTE — PROGRESS NOTES
No medication changes at Cardiology office visit today    ANTICOAGULATION MANAGEMENT     Eliseo Tanner 67 year old male is on warfarin with therapeutic INR result. (Goal INR 1.7-2.3)    Recent labs: (last 7 days)     03/02/23  0832   INR 1.92*       ASSESSMENT       Source(s): Chart Review and Patient/Caregiver Call       Warfarin doses taken: Warfarin taken as instructed    Diet: No new diet changes identified    New illness, injury, or hospitalization: No    Medication/supplement changes: None noted    Signs or symptoms of bleeding or clotting: No    Previous INR: Therapeutic last 2(+) visits    Additional findings: None         PLAN     Recommended plan for no diet, medication or health factor changes affecting INR     Dosing Instructions: Continue your current warfarin dose with next INR in 1 week       Summary  As of 3/2/2023    Full warfarin instructions:  4 mg every day   Next INR check:  3/9/2023             Telephone call with Eliseo who verbalizes understanding and agrees to plan.    Patient to recheck with home meter    Education provided:     Contact [phone] with any changes, questions or concerns.     Plan made per [facility] anticoagulation protocol and per LVAD protocol    PACO MARQUEZ RN  Anticoagulation Clinic  3/2/2023    _______________________________________________________________________     Anticoagulation Episode Summary     Current INR goal:  1.7-2.3   TTR:  45.4 % (10.8 mo)   Target end date:  Indefinite   Send INR reminders to:  ANTICOAG LVAD    Indications    LVAD (left ventricular assist device) present (H) [Z95.811]  Chronic systolic congestive heart failure (H) [I50.22]  Paroxysmal atrial fibrillation (H) [I48.0]           Comments:  Follow VAD Anticoag protocol:Yes: HeartMate 3   Bridging: Call MD each time   Date VAD placed: 2/18/2020 5/6/22 [employer] [facility]         Anticoagulation Care Providers     Provider Role Specialty Phone number    Nader Cisneros MD Referring Cardiovascular  Disease [phone]

## 2023-03-02 NOTE — PROGRESS NOTES
March 2, 2023     Eliseo Tanner is a 67 year old male with chronic systolic heart failure secondary to NICM now s/p HM 3 on 2/18, moderate CAD, HTN, ABHINAV on CPAP, DM2, CKD Stage III, ANA. His HM3 post-op course was complicated by retrosternal hematoma and bleeding in the lungs, RV failure,VT in ICU now on amiodarone and Afib w/AVR S/p DCCV on 2/28. He had pre-op proteus and enterococcus bacteremia and he has had MSSA bacteremia as well, s/p abx; later on had LDH elevation 2/2 to legionella c/b renal failure requiring temporary dialysis (off iHD since 3/10/2021). He did have 2 admissions in the very recent past. This includes admission after I saw him in clinic in 12/2022 for driveline infection and is s/p I&D at the time and also had acute blood loss anemia in the setting of abdominal wound bleeding. After discontinue he did relatively well but had a repeat admission in 2/2023 admission for acute on chronic HF exacerbation, recurrent driveline infection and hypertension. Ultimately his LVAD speed was increased to 6000 but after repeat TTE it was decreased back to 5800 RPM. Weight was 177LBS at the time of his discharge. He was continued on daptomycin, hydralazine 100mg TID as well as imdur 120mg daily.   He presents today for LVAD follow-up today.  Overall he has been doing relatively well since hospital discharge in fact he feels that the torsemide has been working very well for him.  He is not taking the HCTZ at this time.  He does not have any lower extremity edema and continues with a daily daptomycin which was given in the hospital setting.  Every day this is given around 10:00.  Here as well as in [place] with the plan is to continue.  He is following up with ID on the 10th and then the further discussions can be made how to proceed.  No other complaints takes no medications, no bleeding strokelike symptoms.  His blood pressure is little elevated today however he has not taken his medication yet  given that he was fasting.      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)     MSSA     Legionella pneumonia (H)      LVAD (left ventricular assist device) present (H)      MSSA bacteremia 2020     FAMILY HISTORY:  No relevant past family history    SOCIAL HISTORY:  Social History     Socioeconomic History     Marital status:    Tobacco Use     Smoking status: Former     Types: Cigarettes     Quit date: 1994     Years since quittin.9     Smokeless tobacco: Never   Substance and Sexual Activity     Alcohol use: Not Currently     Drug use: Never     CURRENT MEDICATIONS:  Current Outpatient Medications   Medication     allopurinol (ZYLOPRIM) 100 MG tablet     amiodarone (PACERONE) 200 MG tablet     amLODIPine (NORVASC) 5 MG tablet     aspirin (ASA) 81 MG EC tablet     B Complex-C-Folic Acid (RENAL) 1 MG CAPS     co-enzyme Q-10 200 MG CAPS     dapagliflozin (FARXIGA) 5 MG TABS tablet     DAPTOmycin (CUBICIN) 500 MG injection     ferrous sulfate (FEROSUL) 325 (65 Fe) MG tablet     finasteride (PROSCAR) 5 MG tablet     FLUoxetine (PROZAC) 10 MG capsule     hydrALAZINE (APRESOLINE) 100 MG tablet     hydrochlorothiazide (HYDRODIURIL) 25 MG tablet     isosorbide mononitrate (IMDUR) 120 MG 24 HR ER tablet     levothyroxine (SYNTHROID/LEVOTHROID) 88 MCG tablet     magnesium oxide (MAG-OX) 400 MG tablet     omeprazole (PRILOSEC) 20 MG DR capsule     potassium chloride ER (KLOR-CON M) 20 MEQ CR tablet     senna-docusate (SENOKOT-S/PERICOLACE) 8.6-50 MG tablet     spironolactone (ALDACTONE) 25 MG tablet     tamsulosin (FLOMAX) 0.4 MG capsule     torsemide (DEMADEX) 100 MG tablet     warfarin ANTICOAGULANT (COUMADIN) 2 MG tablet     zolpidem (AMBIEN) 5 MG tablet     No current  facility-administered medications for this visit.     ROS:   Constitutional: No fever, chills, or sweats. Weight is 0 lbs 0 oz  ENT: No visual disturbance, ear ache, epistaxis, sore throat.   Allergies/Immunologic: Negative.   Respiratory: No cough, hemoptysis.   Cardiovascular: As per HPI.   GI: No nausea, vomiting, hematemesis, melena, or hematochezia.   : No urinary frequency, dysuria, or hematuria.   Integument: Negative.   Psychiatric: Pleasant, no major depression noted  Neuro: No focal neurological deficits noted  Endocrinology: Negative.   Musculoskeletal: As per HPI.      EXAM:  BP (!) 100/0 (BP Location: Right arm, Patient Position: Chair, Cuff Size: Adult Regular)   Pulse 66   Wt 87.3 kg (192 lb 6.4 oz)   SpO2 97%   BMI 30.13 kg/m    GENERAL: Appears comfortable, in no acute distress.   HEENT: Eye symmetrical, no discharge or icterus bilaterally. Mucous membranes moist and without lesions.  CV: Hum of HM3, occasional S1S2. JVP ~11.   RESPIRATORY: Respirations regular, even, and unlabored. Lungs CTA throughout.   GI: Soft, non distended with normoactive bowel sounds. No tenderness, rebound, guarding.   EXTREMITIES: Mild to b/l peripheral edema. All extremities are warm and well perfused  NEUROLOGIC: Alert and interacting appropriately. No focal deficits.   MUSCULOSKELETAL: No joint swelling or tenderness.   SKIN: No jaundice. No rashes or lesions. Driveline dressing c/d/i.  LVAD was interrogated at bedside.  Speed remains at 5800 RPM, flow was 4.7 L/min and PI was 3.4.  He did have one power disconnect alarm but otherwise no major issues.     Labs:  Lab Results   Component Value Date    WBC 9.6 02/14/2023    HGB 12.7 (L) 02/14/2023    HCT 41.2 02/14/2023     02/14/2023     02/14/2023    POTASSIUM 3.7 02/14/2023    CHLORIDE 96 (L) 02/20/2023    CO2 31 (H) 02/14/2023    BUN 43.4 (H) 02/14/2023    CR 1.71 (H) 02/14/2023     (H) 02/14/2023    SED 49 (H) 12/08/2022    DD 2.71 (H)  10/18/2022    NTBNPI 3,352 (H) 02/07/2023    NTBNP 4,416 (H) 02/10/2022    TROPI 0.377 (HH) 02/15/2021     (H) 02/07/2023    ALT 84 (H) 02/07/2023    ALKPHOS 310 (H) 02/07/2023    BILITOTAL 0.5 02/07/2023    INR 1.9 (A) 02/21/2023     Echo 2/22/2021: LVEF 15-20%; RV appears at least moderate to severely reduced and grossly dilated; aortic valve opens intermittently   CHAMP 3/3/2021: LVEF 10-20%; moderate to severe RV dilation with severely reduced RV function; aortic valve opens partially with each beat, mild to moderate AI, mild to moderatd MR  Echo 2/10/2022: LVEF 10-15%, moderate RV dilated and moderately reduced function, aortic valve opens with each beat, trace AI, mild MR     [facility] 09/20/2022:  MAP 75  RA --/--/12  RV 42/11  PA 42/16/26  PCWP --/--/15  PA Sat 67%  Jose CO/CI 5.3/2.7   dynes  PVR 2.1 CAMARA    [facility] 2/13/2022:  RA: 15  RV: 63/15  PA: 62/22 (35)  PCWP: 20  TD CI/CO: 2.59/5.39  Jose CI/CO: 2.17/4.53  PVR: 2.8 CAMARA      [facility] 9/20/2022:  MAP 75  RA --/--/12  RV 42/11  PA 42/16/26  PCWP --/--/15  PA Sat 67%  Jose CO/CI 5.3/2.7   dynes  PVR 2.1 CAMARA  CardioMems placement     [facility] 12/12/22:  RA: --/--/10  RV 42/9  PA 40/13/22  PCWP --/--/13  CO/CI: 3.24/1.66 by TD; 4.4/2.25 by Jose  PVR 2.04 (JOSE) camara    TTE 2/7/23:  LVAD cannula was seen in the expected anatomic position in the LV apex. HM3 at 5800RPM. LVIDd 49mm. Abnormal septal motion due to paced thythm. Aortic valve open partially with each systole. Mild AI. Normal flow velocities.  Moderate tricuspid insufficiency is present.  Global right ventricular function is moderately to severely reduced.  IVC diameter >2.1 cm collapsing <50% with sniff suggests a high RA pressure estimated at 15 mmHg or greater.  No pericardial effusion is present.    TTE 2/13/23:  HM3 at 6000RPM. LVAD cannula was seen in the expected anatomic position in the LV apex. The LV is normal in size LVIDd 38 mm.  Aortic valve remains closed during the cardiac cycle, mild  aortic regurgitation. Normal inflow and outflow velocities.  Mild tricuspid insufficiency is present.  Global right ventricular function is severely reduced.  No pericardial effusion is present.     Assessment and Plan:   Eliseo Tanner is a 67 year old male with chronic systolic heart failure secondary to NICM now s/p HM 3 on 2/18, moderate CAD, HTN, ABHINAV on CPAP, DM2, CKD Stage III, ANA. His HM3 post-op course was complicated by retrosternal hematoma and bleeding in the lungs, RV failure,VT in ICU now on amiodarone and Afib w/AVR S/p DCCV on 2/28. He had pre-op proteus and enterococcus bacteremia and he has had MSSA bacteremia as well, s/p abx; later on had LDH elevation 2/2 to legionella c/b renal failure requiring temporary dialysis (off iHD since 3/10/2021).  He presents today for LVAD follow-up.  Overall denies any new complaints he appears euvolemic today.  Torsemide seems to be working well and we will continue this.  We will not make any medication changes.  Note again his blood pressure was elevated however has not taken any of his medication this morning.  I think his blood pressure is overall well controlled.  We will make an effort to see him regularly and he has a follow-up already scheduled with core clinic and I will see him in April in [place].  There is been some discussion with regards to need for additional intravenous diuretics at the time.  Now that the subcutaneous furosemide has been approved he might be someone to try if he needs further diuresis.  This would be potentially very helpful for him given that his stents were delivered.     Chronic SCHF secondary to NICM s/p HM III with RV dysfunction. Implanted 2/18 and complicated by bleeding.   Stage D, NYHA Class IIIA  ACEi/ARB Hydralazine 100 mg TID. Continue amlodipine to 10 mg daily  BB Has been deferred given RHF, deferred d/t bradycardia now  Aldosterone antagonist deferred while other medical therapy is prioritized  SCD  prophylaxis ICD  Fluid status Hypervolemic:    MAP: Goal 65-85, he is-80 today  LDH: 283, stable  Anticoagulation: Coumadin per pharmacy.  Goal 1.8-2.5 for recurrent nosebleeding, INR 1.39- defer bridge, dosing per [facility] team  Antiplatelet: ASA 81 mg po daily  Cardiomems goal: Early since implant, working on refining his goal currently     VAD Interrogation on October 20, 2022. VAD interrogation reviewed with VAD coordinator. Agree with findings. Frequent PI events. No power spikes, speed drops, or other findings suspicious of pump malfunction noted.     CKD stage IV  Baseline has increased over the last year. Current B/l is around 1.8-2.2.   - Improved today to 1.96 (F2.4)  - Diuretic management as above  - BMP next week (increased bumex, refining cardiomems goal)     MSSA Driveline infection, ongoing drainage  - Patient saw Dr. Bhatti today, management per her and the ID team  - On Keflex for now      A. Fib.  Sinus Bradycardia   History of Afib w/ RVR and aberrancy vs. correction vs. AT vs. Slow VT. S/p DCCV on 2/28 back into sinus rhythm. Has been tachycardic in the past but now in sinus bradycardia with increased RV pacing.  - Continue on amiodarone  - Increased lower pacer rate from 40 to 60 and turned on rate response at a prior appointment     HTN, within goal today  - Continue hydralazine and amlodipine     History of HIT  - Avoid heparin products      Follow up   Per schedule     I appreciate the opportunity to participate in the care of Eliseo Tanner . Please do not hesitate to contact me with any further questions.     Sincerely,   Nader Cisneros MD  [facility] Division of Cardiology

## 2023-03-02 NOTE — PROGRESS NOTES
Infusion Nursing Note:  Eliseo Tanner presents today for   Chief Complaint   Patient presents with     Infusion     Daptomycin   Patient seen by provider today: No   present during visit today: Not Applicable.    Note: Dapto infused over ~2 minutes.    Intravenous Access:  Labs drawn without difficulty.  PICC.    Treatment Conditions:  Not Applicable.    Post Infusion Assessment:  Patient tolerated infusion without incident.  Blood return noted pre and post infusion.  Site patent and intact, free from redness, edema or discomfort.  No evidence of extravasations.     Discharge Plan:   Discharge instructions reviewed with: Patient.  Patient and/or family verbalized understanding of discharge instructions and all questions answered.  AVS to patient via [place] [facility].  Patient discharged in stable condition accompanied by: self.  Departure Mode: Ambulatory.    Administrations This Visit     DAPTOmycin (CUBICIN) soln for  mg     Admin Date  03/02/2023 Action  $Given Dose  500 mg Route  Intravenous Administered By  Meenakshi Rothman RN              /79   Pulse 79   Resp 16   SpO2 96%     MEENAKSHI ROTHMAN RN

## 2023-03-02 NOTE — LETTER
Date:March 3, 2023      Provider requested that no letter be sent. Do not send.       [facility]

## 2023-03-02 NOTE — LETTER
3/2/2023      RE: Eliseo Tanner  [address] [place] [place] [address]       Dear Colleague,    Thank you for the opportunity to participate in the care of your patient, Eliseo Tanner, at the [facility] [place] at [facility]. Please see a copy of my visit note below.    March 2, 2023     Eliseo Tanner is a 67 year old male with chronic systolic heart failure secondary to NICM now s/p HM 3 on 2/18, moderate CAD, HTN, ABHINAV on CPAP, DM2, CKD Stage III, ANA. His HM3 post-op course was complicated by retrosternal hematoma and bleeding in the lungs, RV failure,VT in ICU now on amiodarone and Afib w/AVR S/p DCCV on 2/28. He had pre-op proteus and enterococcus bacteremia and he has had MSSA bacteremia as well, s/p abx; later on had LDH elevation 2/2 to legionella c/b renal failure requiring temporary dialysis (off iHD since 3/10/2021). He did have 2 admissions in the very recent past. This includes admission after I saw him in clinic in 12/2022 for driveline infection and is s/p I&D at the time and also had acute blood loss anemia in the setting of abdominal wound bleeding. After discontinue he did relatively well but had a repeat admission in 2/2023 admission for acute on chronic HF exacerbation, recurrent driveline infection and hypertension. Ultimately his LVAD speed was increased to 6000 but after repeat TTE it was decreased back to 5800 RPM. Weight was 177LBS at the time of his discharge. He was continued on daptomycin, hydralazine 100mg TID as well as imdur 120mg daily.   He presents today for LVAD follow-up today.  Overall he has been doing relatively well since hospital discharge in fact he feels that the torsemide has been working very well for him.  He is not taking the HCTZ at this time.  He does not have any lower extremity edema and continues with a daily daptomycin which was given in the hospital setting.  Every day this is given around 10:00.   Here as well as in [place] with the plan is to continue.  He is following up with ID on the  and then the further discussions can be made how to proceed.  No other complaints takes no medications, no bleeding strokelike symptoms.  His blood pressure is little elevated today however he has not taken his medication yet given that he was fasting.      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)     MSSA     Legionella pneumonia (H)      LVAD (left ventricular assist device) present (H)      MSSA bacteremia 2020     FAMILY HISTORY:  No relevant past family history    SOCIAL HISTORY:  Social History     Socioeconomic History     Marital status:    Tobacco Use     Smoking status: Former     Types: Cigarettes     Quit date: 1994     Years since quittin.9     Smokeless tobacco: Never   Substance and Sexual Activity     Alcohol use: Not Currently     Drug use: Never     CURRENT MEDICATIONS:  Current Outpatient Medications   Medication     allopurinol (ZYLOPRIM) 100 MG tablet     amiodarone (PACERONE) 200 MG tablet     amLODIPine (NORVASC) 5 MG tablet     aspirin (ASA) 81 MG EC tablet     B Complex-C-Folic Acid (RENAL) 1 MG CAPS     co-enzyme Q-10 200 MG CAPS     dapagliflozin (FARXIGA) 5 MG TABS tablet     DAPTOmycin (CUBICIN) 500 MG injection     ferrous sulfate (FEROSUL) 325 (65 Fe) MG tablet     finasteride (PROSCAR) 5 MG tablet     FLUoxetine (PROZAC) 10 MG capsule     hydrALAZINE (APRESOLINE) 100 MG tablet     hydrochlorothiazide (HYDRODIURIL) 25 MG tablet     isosorbide mononitrate (IMDUR) 120 MG 24 HR ER tablet     levothyroxine (SYNTHROID/LEVOTHROID) 88 MCG tablet     magnesium oxide (MAG-OX) 400 MG tablet     omeprazole (PRILOSEC) 20 MG DR capsule     potassium  chloride ER (KLOR-CON M) 20 MEQ CR tablet     senna-docusate (SENOKOT-S/PERICOLACE) 8.6-50 MG tablet     spironolactone (ALDACTONE) 25 MG tablet     tamsulosin (FLOMAX) 0.4 MG capsule     torsemide (DEMADEX) 100 MG tablet     warfarin ANTICOAGULANT (COUMADIN) 2 MG tablet     zolpidem (AMBIEN) 5 MG tablet     No current facility-administered medications for this visit.     ROS:   Constitutional: No fever, chills, or sweats. Weight is 0 lbs 0 oz  ENT: No visual disturbance, ear ache, epistaxis, sore throat.   Allergies/Immunologic: Negative.   Respiratory: No cough, hemoptysis.   Cardiovascular: As per HPI.   GI: No nausea, vomiting, hematemesis, melena, or hematochezia.   : No urinary frequency, dysuria, or hematuria.   Integument: Negative.   Psychiatric: Pleasant, no major depression noted  Neuro: No focal neurological deficits noted  Endocrinology: Negative.   Musculoskeletal: As per HPI.      EXAM:  BP (!) 100/0 (BP Location: Right arm, Patient Position: Chair, Cuff Size: Adult Regular)   Pulse 66   Wt 87.3 kg (192 lb 6.4 oz)   SpO2 97%   BMI 30.13 kg/m    GENERAL: Appears comfortable, in no acute distress.   HEENT: Eye symmetrical, no discharge or icterus bilaterally. Mucous membranes moist and without lesions.  CV: Hum of HM3, occasional S1S2. JVP ~11.   RESPIRATORY: Respirations regular, even, and unlabored. Lungs CTA throughout.   GI: Soft, non distended with normoactive bowel sounds. No tenderness, rebound, guarding.   EXTREMITIES: Mild to b/l peripheral edema. All extremities are warm and well perfused  NEUROLOGIC: Alert and interacting appropriately. No focal deficits.   MUSCULOSKELETAL: No joint swelling or tenderness.   SKIN: No jaundice. No rashes or lesions. Driveline dressing c/d/i.  LVAD was interrogated at bedside.  Speed remains at 5800 RPM, flow was 4.7 L/min and PI was 3.4.  He did have one power disconnect alarm but otherwise no major issues.     Labs:  Lab Results   Component Value Date     WBC 9.6 02/14/2023    HGB 12.7 (L) 02/14/2023    HCT 41.2 02/14/2023     02/14/2023     02/14/2023    POTASSIUM 3.7 02/14/2023    CHLORIDE 96 (L) 02/20/2023    CO2 31 (H) 02/14/2023    BUN 43.4 (H) 02/14/2023    CR 1.71 (H) 02/14/2023     (H) 02/14/2023    SED 49 (H) 12/08/2022    DD 2.71 (H) 10/18/2022    NTBNPI 3,352 (H) 02/07/2023    NTBNP 4,416 (H) 02/10/2022    TROPI 0.377 (HH) 02/15/2021     (H) 02/07/2023    ALT 84 (H) 02/07/2023    ALKPHOS 310 (H) 02/07/2023    BILITOTAL 0.5 02/07/2023    INR 1.9 (A) 02/21/2023     Echo 2/22/2021: LVEF 15-20%; RV appears at least moderate to severely reduced and grossly dilated; aortic valve opens intermittently   CHAMP 3/3/2021: LVEF 10-20%; moderate to severe RV dilation with severely reduced RV function; aortic valve opens partially with each beat, mild to moderate AI, mild to moderatd MR  Echo 2/10/2022: LVEF 10-15%, moderate RV dilated and moderately reduced function, aortic valve opens with each beat, trace AI, mild MR     [facility] 09/20/2022:  MAP 75  RA --/--/12  RV 42/11  PA 42/16/26  PCWP --/--/15  PA Sat 67%  Jose CO/CI 5.3/2.7   dynes  PVR 2.1 CAMARA    [facility] 2/13/2022:  RA: 15  RV: 63/15  PA: 62/22 (35)  PCWP: 20  TD CI/CO: 2.59/5.39  Jose CI/CO: 2.17/4.53  PVR: 2.8 CAMARA      [facility] 9/20/2022:  MAP 75  RA --/--/12  RV 42/11  PA 42/16/26  PCWP --/--/15  PA Sat 67%  Jose CO/CI 5.3/2.7   dynes  PVR 2.1 CAMARA  CardioMems placement     [facility] 12/12/22:  RA: --/--/10  RV 42/9  PA 40/13/22  PCWP --/--/13  CO/CI: 3.24/1.66 by TD; 4.4/2.25 by Jose  PVR 2.04 (JOSE) camara    TTE 2/7/23:  LVAD cannula was seen in the expected anatomic position in the LV apex. HM3 at 5800RPM. LVIDd 49mm. Abnormal septal motion due to paced thythm. Aortic valve open partially with each systole. Mild AI. Normal flow velocities.  Moderate tricuspid insufficiency is present.  Global right ventricular function is moderately to severely reduced.  IVC diameter >2.1 cm collapsing  <50% with sniff suggests a high RA pressure estimated at 15 mmHg or greater.  No pericardial effusion is present.    TTE 2/13/23:  HM3 at 6000RPM. LVAD cannula was seen in the expected anatomic position in the LV apex. The LV is normal in size LVIDd 38 mm.  Aortic valve remains closed during the cardiac cycle, mild aortic regurgitation. Normal inflow and outflow velocities.  Mild tricuspid insufficiency is present.  Global right ventricular function is severely reduced.  No pericardial effusion is present.     Assessment and Plan:   Eliseo Tanner is a 67 year old male with chronic systolic heart failure secondary to NICM now s/p HM 3 on 2/18, moderate CAD, HTN, ABHINAV on CPAP, DM2, CKD Stage III, ANA. His HM3 post-op course was complicated by retrosternal hematoma and bleeding in the lungs, RV failure,VT in ICU now on amiodarone and Afib w/AVR S/p DCCV on 2/28. He had pre-op proteus and enterococcus bacteremia and he has had MSSA bacteremia as well, s/p abx; later on had LDH elevation 2/2 to legionella c/b renal failure requiring temporary dialysis (off iHD since 3/10/2021).  He presents today for LVAD follow-up.  Overall denies any new complaints he appears euvolemic today.  Torsemide seems to be working well and we will continue this.  We will not make any medication changes.  Note again his blood pressure was elevated however has not taken any of his medication this morning.  I think his blood pressure is overall well controlled.  We will make an effort to see him regularly and he has a follow-up already scheduled with core clinic and I will see him in April in [place].  There is been some discussion with regards to need for additional intravenous diuretics at the time.  Now that the subcutaneous furosemide has been approved he might be someone to try if he needs further diuresis.  This would be potentially very helpful for him given that his stents were delivered.     Chronic SCHF secondary to NICM s/p  HM III with RV dysfunction. Implanted 2/18 and complicated by bleeding.   Stage D, NYHA Class IIIA  ACEi/ARB Hydralazine 100 mg TID. Continue amlodipine to 10 mg daily  BB Has been deferred given RHF, deferred d/t bradycardia now  Aldosterone antagonist deferred while other medical therapy is prioritized  SCD prophylaxis ICD  Fluid status Hypervolemic:    MAP: Goal 65-85, he is-80 today  LDH: 283, stable  Anticoagulation: Coumadin per pharmacy.  Goal 1.8-2.5 for recurrent nosebleeding, INR 1.39- defer bridge, dosing per ACC team  Antiplatelet: ASA 81 mg po daily  Cardiomems goal: Early since implant, working on refining his goal currently     VAD Interrogation on October 20, 2022. VAD interrogation reviewed with VAD coordinator. Agree with findings. Frequent PI events. No power spikes, speed drops, or other findings suspicious of pump malfunction noted.     CKD stage IV  Baseline has increased over the last year. Current B/l is around 1.8-2.2.   - Improved today to 1.96 (F2.4)  - Diuretic management as above  - BMP next week (increased bumex, refining cardiomems goal)     MSSA Driveline infection, ongoing drainage  - Patient saw Dr. Bhatti today, management per her and the ID team  - On Keflex for now      A. Fib.  Sinus Bradycardia   History of Afib w/ RVR and aberrancy vs. NATHALIA vs. AT vs. Slow VT. S/p DCCV on 2/28 back into sinus rhythm. Has been tachycardic in the past but now in sinus bradycardia with increased RV pacing.  - Continue on amiodarone  - Increased lower pacer rate from 40 to 60 and turned on rate response at a prior appointment     HTN, within goal today  - Continue hydralazine and amlodipine     History of HIT  - Avoid heparin products      Follow up   Per schedule     I appreciate the opportunity to participate in the care of Eliseo Tanner . Please do not hesitate to contact me with any further questions.         Please do not hesitate to contact me if you have any questions/concerns.      Sincerely,     Nader Cisneros MD

## 2023-03-02 NOTE — PATIENT INSTRUCTIONS
Medications:  No medication changes today    Instructions:  Keep the dog aways form the power cables and equipment.  For the future, you do not need to fast when you are having labs drawn here at the clinic unless we specifically tell you to. Even if you are fasting, please take your morning BP/HF meds. For some procedures we will definitely ask you to fast.    Follow-up: (make these appointments before you leave)  1. Please follow-up with VAD LISA Turcios on 3/23 with labs prior.   2. Please follow-up with Dr. Cisneros in [place] in April labs prior.        Page the VAD Coordinator on call if you gain more than 3 lb in a day or 5 in a week. Please also page if you feel unwell or have alarms.   Great to see you in clinic today. To Page the VAD Coordinator on call, dial [phone] option #4 and ask to speak to the VAD coordinator on call.

## 2023-03-02 NOTE — NURSING NOTE
Chief Complaint   Patient presents with     Follow Up     Return VAD- 3 week post hospital VAD follow up     Vitals were taken and medications reconciled.    SANGITA Martinez  9:08 AM

## 2023-03-02 NOTE — LETTER
3/2/2023         RE: Eliseo Tanner  [address] [place] [address]        Dear Colleague,    Thank you for referring your patient, Eliseo Tanner, to the [facility]. Please see a copy of my visit note below.    Infusion Nursing Note:  Eliseo Tanner presents today for   Chief Complaint   Patient presents with     Infusion     Daptomycin   Patient seen by provider today: No   present during visit today: Not Applicable.    Note: Dapto infused over ~2 minutes.    Intravenous Access:  Labs drawn without difficulty.  PICC.    Treatment Conditions:  Not Applicable.    Post Infusion Assessment:  Patient tolerated infusion without incident.  Blood return noted pre and post infusion.  Site patent and intact, free from redness, edema or discomfort.  No evidence of extravasations.     Discharge Plan:   Discharge instructions reviewed with: Patient.  Patient and/or family verbalized understanding of discharge instructions and all questions answered.  AVS to patient via [facility]ak[facility].  Patient discharged in stable condition accompanied by: self.  Departure Mode: Ambulatory.    Administrations This Visit     DAPTOmycin (CUBICIN) soln for  mg     Admin Date  03/02/2023 Action  $Given Dose  500 mg Route  Intravenous Administered By  Meenakshi Rothman RN              /79   Pulse 79   Resp 16   SpO2 96%     MEENAKSHI ROTHMAN RN                        Again, thank you for allowing me to participate in the care of your patient.        Sincerely,        Specialty Infusion Nurse

## 2023-03-02 NOTE — NURSING NOTE
MCS VAD Pump Info     Row Name 03/02/23 1107             MCS VAD Information    Implant LVAD      LVAD Pump HeartMate 3         Heartmate 3 LEFT VS    Flow (Lpm) 4.9 Lpm      Pulse Index (PI) 3.3 PI      Speed (rpm) 5800 rpm      Power (muhammad) 5.1 muhammad      Current Hct setting 36.2      Retired: Unexpected Alarms --         Primary Controller    Serial number [id number]      Low flow alarm setting 2.5      High watt alarm setting --      EBB: Patient use 18      Replace in 23 Months         Backup Controller    Serial number [id number]      EBB: Patient use 13      Replace EBB in 25 Months      Speed & HCT match primary controller Y         VAD Interrogation    Alarms reported by patient Y  There were two No Ext Power alarms. Eliseo remembered one of them. He thinks the power cable on the PU is frayed form the dog chewing on it.New MPU given [id number]. I advised Eliseo to keep the dog away from his cords.      Unexpected alarms noted upon interrogation No External Power      PI events Rare      Damage to equipment is noted N      Action taken Reviewed proper equipment care and maintenance         Driveline Exit Site    Dressing change done Y  Suture removed. Blue foam dsng used, Betadine used ot clean the skin and driveline under the dsng. Secured with the outer cover of the daily dsng. Secured with medipore tape. [place] changed. Sutre removed      Driveline properly secured Yes      DLES assessment drainage  Cultures sent      Dressing used Daily kit  with blue foam that Eliseo supplied from [facility]      Frequency patient changes dressing Daily  [facility] changes the dsng Q1 - 3 days depedning on drainage.      Dressing modifications --      Dressing change supplier --                        Education Complete: Yes   Charge the BACKUP controller s backup battery every 6 months  Perform a self test on BACKUP every 6 months  Change the MPU s batteries every 6 months:Yes  Have equipment serviced yearly  (if applicable):Yes at the yearly appointment      D:  Pt education provided.  I:  Pt educated on the following topics:  1.  When to call 911.  Reviewed emergency situations (handout provided with what to do in an emergency)  2. When to page the VAD Coordinator on Call (handout provided w/ frequent symptoms to report).  3. Reviewed how to page the VAD Coordinator on Call.     A:  Pt verbalized understanding.  P:  VAD Coordinator available for questions or concerns.  Will continue VAD education.

## 2023-03-02 NOTE — PROGRESS NOTES
[facility] CORE Clinic - CardioMEMS Reading Review    March 2, 2023, 0929   CardioMems period: 2/17/23 -  3/19/2023      CardioMEMS reviewed and routed to patient's provider, Laine BARRY NP.     Current diuretic:  Torsemide 100 TID with PRN Hydrochlorothiazide, Spironolactone 12.5mg daily  Current potassium chloride dose:  Potassium 60mEq BID    Symptoms: Pt being seen clinic by Dr. Cisneros   Weights: 183 184 181 182 182 183     Changes to plan of care today: to be determined in clinic    Current Threshold parameters: 21 - 24    Today's Waveform:       Readings:         RHC Date Wedge Pressure MEMS PAD during cath  (average of the 3 values)     Sept 20, 2022 w/MEMS implant     15 mmHg   16 mmHg

## 2023-03-06 NOTE — TELEPHONE ENCOUNTER
Dr. Ayoub,     Micro today came back with additional findings   Aerobic wound culture from 3/2   1+ Pseudomonas Aeruginosa which is not currently being treated.   Staph Aureus being treated with Dapto.

## 2023-03-06 NOTE — TELEPHONE ENCOUNTER
[facility] Call Center    Phone Message    May a detailed message be left on voicemail: yes     Reason for Call: Other: Shasta (antibitoic infusion nurse ) called - would like to discuss treatment and results of culture that was done last week.  Please return call.  Thank you.     Action Taken: Other: id    Travel Screening: Not Applicable

## 2023-03-06 NOTE — PROGRESS NOTES
D:  Rcvd culture results from driveline exit site.  I:  Forwarded results to infectious diease RN's and Dr. Bhatti for recommendations.  Called and informed pt that ID will reach out with updated plan.  A:  Follow up  P:    Pt verbalized understanding of the instructions given.  Will call VAD coordinator with further needs and questions.

## 2023-03-07 NOTE — PROGRESS NOTES
The pt had new drainage and it was cultured on 3/2/23. Cultures are growing S. Aureus (requested dapto susceptibilities ) and PsA. The pt is already on Dapto for prior MRSA infection. Given new PsA cultures will add cefepime to the daptomycin 6mg/kg.

## 2023-03-10 NOTE — PROGRESS NOTES
[facility] CORE Clinic - CardioMEMS Reading Review    March 10, 2023, 1216   CardioMems period: 2/17/23 -  3/19/2023      CardioMEMS reviewed and routed to patient's provider, Laine BARRY NP.     Current diuretic:  Torsemide 100 TID with PRN Hydrochlorothiazide, Spironolactone 12.5mg daily  Current potassium chloride dose:  Potassium 60mEq BID    Symptoms: No symptoms  Weights: Today, going back: 189 183 185 184 182 184 184 186    Changes to plan of care today: No change    Current Threshold parameters: 21 - 24    Today's Waveform:       Readings:         RHC Date Wedge Pressure MEMS PAD during cath  (average of the 3 values)     Sept 20, 2022 w/MEMS implant     15 mmHg   16 mmHg

## 2023-03-10 NOTE — PROGRESS NOTES
[facility]  LVAD ID clinic      Patient:  Eliseo Tanner, Date of birth 1955, Medical record number [id number]  Date of Visit:  03/10/2023          Assessment and Recommendations:   ID Problem List  1. Chronic VAD driveline infection   1. Presented to OSH on 12/7 which grew MRSA and transferred to [facility] on 12/9/22  2. s/p I&D of fluid collection on 12/11/22; Cx +MSSA (tetra-R)  3. Was on PO suppression with Bactrim PTA pior but now on dapto since 12/2022  4. Latest cultures from 3/2/23 with Pseudomonas as well as MSSA  2. History of MSSA bacteremia 11/2020 secondary to MSSA driveline infection  3. History of pre-op Proteus and Enterococcus bacteremia  4. History of severe Legionella pneumonia (serogroup 1L) c/b cardiogenic shock, renal failure requiring CRRT, and resp failure requiring intubation, s/p azithromycin   5. CKD stage 3     RECOMMENDATION:  - Continue Daptomycin at 6mg/kg IV q24h, given increased amount of drainage  - Start Ciprofloxacin 750mg q12h PO (anti-Pseudomonas, CrCl 50-57 ml/min)  - Obtain CT A/P to evaluate for fluid collection around driveline  - If no recurrent fluid collection, will continue current antibiotics, have sent for Dalbavancin testing so that is an option  - Another option is switching IV Dapto to PO Bactrim to cover MSSA - this will eliminate need for line  - If patient has fevers, recommend getting blood cultures x 2  - Follow up in 2 weeks    Assessment:  Patient with chronic LVAD driveline infection with MSSA and MRSA s/p I&D 12/11/22 with a slowly healing wound which required a wound vac until recently. Patient has been on a prolonged course of Daptomycin which was planned to be continued initially until wound completely healed. However, over the last 2 weeks now noted to have clinical worsening, regression in wound healing and more drainage. Cultures from 3/2 with MSSA x 2, one strain with high Dapto DONTA (4) and Pseudomonas x 2. Not on  "anti-Pseudomonals yet. Will start Cipro today. Continue Dapto for now. Given amount of drainage, plan to obtain repeat imaging to r/o fluid collection.     Time spent on date of encounter between chart review, video visit (20 mins) and documentation = 47 mins    ERIN Mullins  Staff Physician, Infectious Diseases  Pager [phone]        Interval History:   Patient was last seen in ID clinic 2/1/23. At that time, he was on Daptomycin and scheduled to remain on the same until wound healed. Over the last 2 weeks, per patient's wife, his wound which was previously healing (albeit slowly) started to regress - noted to have more redness, some tenderness along driveline and progressive drainage. Cultures obtained 3/2 - growing MSSA x 2 and 2 species of Pseudomonas aeruginosa. Patient has not started Cipro yet    He denies fevers, chills, rigors, night sweats. Ongoing issues with drainage as reported - greenish, purulent, \"goopy\". Denies cough, SOB. Denies nausea, vomiting, diarrhea    Does not have wound vac anymore         History of the Infectious Disease lllness:   Eliseo Tanner is a 67 year old male with past medical history significant for CHF from NIC s/p HM3 and ICD placement (2/18/20), DM, CKD, chronic MSSA driveline infection on PO suppression, history of MSSA bacteremia (11/2020) 2/2 driveline infection, and pre-operative bacteremia (proteus, enterococcus) who was admitted 12/8/22 for increased driveline drainage and heart failure exacerbation. He recently completed course of IV cefazolin 10/21-11/18/22 for increased driveline drainage with MSSA (tetracycline R) on cultures. This improved but did not fully resolve drainage per patient. He was transitioned to PO Bactrim for suppression on 11/19, renally adjusted to 1 single strength daily, but had increased purulent drainage noted 1 week later. Previous suppressive regimens of cephalexin and cefadroxil with similar breakthrough drainage despite in " vitro susceptibilities. He went to a local facility on 12/7/22 where cultures were obtained and grew MRSA and got transferred to [facility] for LVAD infection.      On admission here he got a CT C/A/P with LVAD driveline showing surrounding fluid along tract increased from prior, no organized fluid collection or significant inflammatory changes and trace ascites. He went to the OR on 12/11/22 for I&D. Cultures grew MSSA.  He was started empirically on micafungin x1, vancomycin, and zosyn, then narrowed to cefazolin monotherapy when cultures returned with MSSA (tetracycline resistant). He was subsequently broadened to dapto a few days later given the OSH cultures from 12/7/22 growing MRSA. A wound vac was placed and started on a initial 4 week IV abx therapy with dapto.      He had a virtual ID clinic visit on 11/13/23. He has been on Dapto 6mg/kg since disharge and has been doing well. He has no complaints. He states that his drainage is better by about 75% and the wound is healing but not completely healed. He has no muscle pain, fevers, chills, sob, chest pain, abd pain, n/v/d or dysuria,      He is here again for an ID virtual visit on 1/31/23. He was reached over the video during his wound vac change. The wound is still has drainage but is getting better. The wound is closing up more as well.  Over video I could not appreciate any drainage. The wife and the wound nurse are happy with the progress. The wound measurement 7cm (Length)x 2cm (width)x.03cm (depth). He has no fevers, chills, sob, cough, n/v/d, muscle aches. His last CK on 1/24 is in normal range. No dysuria.       Transplants:  N/A; Coordinator Kay Padilla    Review of Systems:  Remaining systems reviewed and negative    Outpatient Medications Marked as Taking for the 3/10/23 encounter (Virtual Visit) with  LVAD   Medication Sig     allopurinol (ZYLOPRIM) 100 MG tablet 2 tablets (200 mg) by Oral or Feeding Tube route daily     amiodarone (PACERONE) 200 MG  tablet TAKE 1 TABLET BY MOUTH ONCE DAILY     amLODIPine (NORVASC) 5 MG tablet Take 2 tablets (10 mg) by mouth daily     aspirin (ASA) 81 MG EC tablet Take 1 tablet (81 mg) by mouth daily     B Complex-C-Folic Acid (RENAL) 1 MG CAPS Take 1 capsule by mouth daily     ciprofloxacin (CIPRO) 750 MG tablet Take 1 tablet (750 mg) by mouth 2 times daily for 14 days     co-enzyme Q-10 200 MG CAPS 200 mg by Oral or Feeding Tube route daily     dapagliflozin (FARXIGA) 5 MG TABS tablet Take 5 mg by mouth daily     DAPTOmycin (CUBICIN) 500 MG injection Inject 500 mg into the vein daily     ferrous sulfate (FEROSUL) 325 (65 Fe) MG tablet Take 325 mg by mouth daily (with breakfast)     finasteride (PROSCAR) 5 MG tablet Take 1 tablet (5 mg) by mouth daily Helps with urinary retention.     FLUoxetine (PROZAC) 10 MG capsule Take 30 mg by mouth daily     hydrALAZINE (APRESOLINE) 100 MG tablet Take 1 tablet (100 mg) by mouth 3 times daily     hydrochlorothiazide (HYDRODIURIL) 25 MG tablet Take as instruction by the [facility]     isosorbide mononitrate (IMDUR) 120 MG 24 HR ER tablet Take 1 tablet (120 mg) by mouth daily for 90 days     levothyroxine (SYNTHROID/LEVOTHROID) 88 MCG tablet Take 1 tablet (88 mcg) by mouth every morning (before breakfast)     magnesium oxide (MAG-OX) 400 MG tablet 1 tablet (400 mg) by Oral or Feeding Tube route 2 times daily     omeprazole (PRILOSEC) 20 MG DR capsule Take 20 mg by mouth daily     potassium chloride ER (KLOR-CON M) 20 MEQ CR tablet Take 3 tablets (60 mEq) by mouth 2 times daily for 90 days     senna-docusate (SENOKOT-S/PERICOLACE) 8.6-50 MG tablet Take 1 tablet by mouth 2 times daily as needed for constipation     spironolactone (ALDACTONE) 25 MG tablet Take 1 tablet (25 mg) by mouth daily for 90 days     tamsulosin (FLOMAX) 0.4 MG capsule Take 0.8 mg by mouth every evening This med helps with urinary retention     torsemide (DEMADEX) 100 MG tablet Take 1 tablet (100 mg) by mouth 3 times  daily for 90 days     warfarin ANTICOAGULANT (COUMADIN) 2 MG tablet Take 4 mg by mouth daily     zolpidem (AMBIEN) 5 MG tablet Take 5 mg by mouth nightly as needed for Insomnia       Allergies   Allergen Reactions     Heparin      HIT screen positive 2/14/20, reflex DAVINA negative; however heme recommended treating as if positive  HIT screen negative 2/11/20     Oxycodone Itching and Other (See Comments)     Chlorhexidine Rash              Physical Exam:     There were no vitals taken for this visit.  Wt Readings from Last 4 Encounters:   03/02/23 87.3 kg (192 lb 6.4 oz)   02/14/23 80.3 kg (177 lb 1.6 oz)   12/20/22 79.9 kg (176 lb 1.6 oz)   10/18/22 89 kg (196 lb 3.4 oz)       Exam: Limited exam as visit was conducted via [facility]  GENERAL: well-developed, well-nourished, alert, oriented, in no acute distress.  HEAD: Head is normocephalic, atraumatic   EYES: Eyes have anicteric sclerae.    NEUROLOGIC: Grossly nonfocal.  Abd wound/driveline exit site: wound with some erythema, copious purulent drainage around driveline exit site                 Laboratory Data:       Inflammatory Markers    Recent Labs   Lab Test 12/08/22  1256 08/17/21  0807 02/16/21  2219 02/15/21  1910 02/11/21  0838 12/17/20  0756 12/14/20  0815 11/05/20  0000 09/21/20  1438   SED 49*  --  72* 47*  --   --   --   --   --    CRP  --  4.9 270.0* 270.0* 8.6 8.0 6.1 54.9 6.6       Metabolic Studies    Recent Labs   Lab Test 03/02/23  1049 03/02/23  0832 02/20/23  1027 02/14/23  0723 02/13/23  1805 09/11/22  1132 09/11/22  1044 02/18/22  0845 02/10/22  0730 02/24/21  1855 02/24/21  0442   NA  --  133*  --  136 135*   < > 140   < > 138   < > 132*   POTASSIUM  --  3.6  --  3.7 3.7   < > 4.9   < > 4.2   < > 4.1   CHLORIDE  --  95* 96* 94* 92*   < > 100   < > 108   < > 99   CO2  --  26  --  31* 29   < > 29   < > 21   < > 25   ANIONGAP  --  12  --  11 14   < > 11   < > 9   < > 8   BUN  --  48.0*  --  43.4* 46.2*   < > 50.5*   < > 31*   < > 44*   CR  --  1.47*   --  1.71* 1.73*   < > 1.73*   < > 1.29*   < > 3.84*   48156  --   --  1.80*  --   --    < >  --    < >  --    < >  --    GFRESTIMATED  --  51*  --  43* 42*   < > 42*   < > 60*   < > 15*   GLC  --  123*  --  111* 155*   < > 181*   < > 131*   < > 125*   CALOS  --  8.7*  --  8.7* 8.6*   < > 9.3   < > 8.4*   < > 8.0*   PHOS  --   --   --   --   --   --  3.7   < >  --   --   --    MAG  --  2.3  --  2.4*  --    < > 2.2   < >  --    < >  --    URIC  --   --   --   --   --   --   --   --  4.2  --   --    LACT  --   --   --   --   --   --   --   --   --   --  0.7     --   --  50  --    < >  --   --   --   --   --     < > = values in this interval not displayed.       Hepatic Studies    Recent Labs   Lab Test 03/02/23  0832 02/07/23 1826 12/20/22  0408 11/15/20  0830 11/14/20  2041   BILITOTAL 0.9 0.5 0.4   < > 0.7   DBIL  --   --   --   --  0.4*   ALKPHOS 339* 310* 285*   < > 184*   PROTTOTAL 8.1 7.0 6.4   < > 7.8   ALBUMIN 3.5 3.3* 3.1*   < > 2.6*   * 122* 159*   < > 48*   ALT 88* 84* 81*   < > 56   * 288*  --    < >  --     < > = values in this interval not displayed.       Hematology Studies   Recent Labs   Lab Test 03/02/23  0832 02/14/23  0723 02/13/23  0556 02/12/23  0548 02/07/23  1826 02/07/23  1030 03/09/21  0510 03/07/21  0543 03/06/21  0430   WBC 10.1 9.6 9.3 8.9   < >  --    < > 4.5 4.4   33780  --   --   --   --   --  9.4   < >  --   --    ANEU  --   --   --   --   --   --   --  2.6 2.8   ANEUTAUTO  --  7.1 6.7 6.6   < >  --    < >  --   --    ALYM  --   --   --   --   --   --   --  0.9 0.7*   ALYMPAUTO  --  1.0 1.1 0.9   < >  --    < >  --   --    GIULIANO  --   --   --   --   --   --   --  0.7 0.6   AMONOAUTO  --  1.0 1.0 0.9   < >  --    < >  --   --    AEOS  --   --   --   --   --   --   --  0.3 0.2   AEOSAUTO  --  0.4 0.4 0.4   < >  --    < >  --   --    ABSBASO  --  0.1 0.1 0.1   < >  --    < >  --   --    HGB 12.1* 12.7* 13.1* 12.3*   < >  --    < > 7.9* 8.0*   [id number]  --   --   --   --    --  11.9*   < >  --   --    HCT 36.2* 41.2 41.9 40.2   < >  --    < > 25.9* 25.3*    336 349 349   < >  --    < > 208 196   08788  --   --   --   --   --  339   < >  --   --     < > = values in this interval not displayed.       Clotting Studies    Recent Labs   Lab Test 03/02/23  0832 02/21/23  1118 02/20/23  1027 02/14/23  0723 03/16/21  0551 03/15/21  0551   INR 1.92* 1.9* 1.9* 2.36*   < > 2.53*   PTT  --   --   --   --   --  37    < > = values in this interval not displayed.       Microbiology:    Last Culture results   Culture   Date Value Ref Range Status   03/02/2023 1+ Pseudomonas aeruginosa (A)  Preliminary   03/02/2023 1+ Pseudomonas aeruginosa (A)  Preliminary   03/02/2023 1+ Staphylococcus aureus (A)  Corrected     Comment:     Susceptibility testing requested by Sunni Jimenez pharmacist ([id number]) to check slightly elevated vanco result. 3.8.23 at 1047  Susceptibility testing requested by Sunni Jimenez, pharmacist for dalbavancin.    03/02/2023 1+ Staphylococcus aureus (A)  Preliminary   03/02/2023 No anaerobic organisms isolated  Final   12/11/2022 No anaerobic organisms isolated  Final   12/11/2022 No Growth  Final   12/11/2022 1+ Staphylococcus aureus (A)  Final     Comment:     Susceptibilities done on previous cultures   12/11/2022 1+ Staphylococcus aureus (A)  Final     Comment:     Susceptibilities done on previous cultures   12/08/2022 No Growth  Final   12/08/2022 2+ Staphylococcus aureus (A)  Final   12/08/2022 2+ Staphylococcus aureus (A)  Final   12/08/2022 No Growth  Final   10/18/2022 2+ Staphylococcus aureus (A)  Final   10/18/2022 2+ Staphylococcus aureus (A)  Final   09/20/2022 2+ Staphylococcus aureus (A)  Final   09/20/2022 1+ Normal freeman  Final   09/20/2022 No anaerobic organisms isolated  Final   02/10/2022 1+ Staphylococcus aureus (A)  Final   02/10/2022 1+ Cutibacterium (Propionibacterium) acnes (A)  Final     Comment:     Susceptibility testing of Cutibacterium (Propionibacterium)  species is not done from this source. This organism is susceptible to penicillin and cefotaxime and most are susceptible to clindamycin.     Culture Micro   Date Value Ref Range Status   02/17/2021 No growth  Final   02/17/2021 No growth  Final   02/16/2021 Light growth  Enterococcus faecium   (A)  Final   02/16/2021 (A)  Final    Legionella pneumophila  isolated  Sent to [facility] for serotyping     02/16/2021   Final    Critical Value/Significant Value, preliminary result only, called to and read back by  Shobha Pedro RN on 2.23.21 at 1401. bw     02/16/2021 (A)  Final    Report received from the [place] [facility] of Health:  Legionella pneumophila serogroup 1  This test was developed and its performance characteristics determined by the [facility].  It has not been cleared or approved by the U.S. Food and Drug   Administration:21CFR 809.30(e).  The FDA has determined that such clearance is not   necessary.     02/15/2021 Moderate growth  Staphylococcus aureus   (A)  Final   02/15/2021 Moderate growth  Strain 2  Staphylococcus aureus   (A)  Final   02/15/2021 Moderate growth  Strain 3  Staphylococcus aureus   (A)  Final   02/15/2021 Light growth  Normal skin freeamn    Final         Last checks of Clostridioides difficile testing  Recent Labs   Lab Test 11/15/20  1445   CDBPCT Negative     Quantiferon testing   Recent Labs   Lab Test 02/14/23  0723 02/13/23  0556 02/13/20  1030 02/12/20  0440 02/08/20  0408   TBRES  --   --   --  Negative Negative   LYMPH 10 11   < > 4.8 17.5    < > = values in this interval not displayed.     Virology:  Coronavirus-19 testing    Recent Labs   Lab Test 02/10/23  1040 02/07/23  2221 12/08/22  1641 09/10/22  0844 09/02/22  1749 02/07/22  1456 01/03/22  1011 08/17/21  1229 03/15/21  1612 03/09/21  1335 01/05/21  1521 11/14/20  2140   [id number]  --   --   --   --   --   --   --   --  Positive  --   --   --    [id number]  --   --   --   --   --   --   --   --  1:100  --   --    --    [id number] Negative Negative Negative Negative   < >  --   --   --   --  NEGATIVE   < > Test received-See reflex to IDDL test SARS CoV2 (COVID-19) Virus RT-PCR  NEGATIVE   [id number]  --   --   --   --   --   --   --   --   --  Nasopharyngeal   < > Nasopharyngeal   [id number]  --   --   --   --   --   --   --   --   --   --   --  Nasopharyngeal   [id number]  --   --   --   --   --  Detected*  Detected* POSITIVE*  POSITIVE*  --   --   --   --   --    [id number]  --   --   --   --   --   --   --  Negative  --   --   --   --    [id number]  --   --   --   --   --   --   --  <0.40  --   --   --   --     < > = values in this interval not displayed.       Hepatitis B Testing     Recent Labs   Lab Test 02/28/21  1155 02/12/20  0440 02/08/20  0408   AUSAB 0.00 0.69 1.99   HBCAB  --  Nonreactive Nonreactive   HEPBANG Nonreactive Nonreactive Nonreactive      Hepatitis C Antibody   Date Value Ref Range Status   02/12/2020 Nonreactive NR^Nonreactive Final     Comment:     Assay performance characteristics have not been established for newborns,   infants, and children     02/08/2020 Nonreactive NR^Nonreactive Final     Comment:     Assay performance characteristics have not been established for newborns,   infants, and children         CMV Antibody IgG   Date Value Ref Range Status   02/12/2020 >8.0 (H) 0.0 - 0.8 AI Final     Comment:     Positive  Antibody index (AI) values reflect qualitative changes in antibody   concentration that cannot be directly associated with clinical condition or   disease state.     02/08/2020 7.8 (H) 0.0 - 0.8 AI Final     Comment:     Positive  Antibody index (AI) values reflect qualitative changes in antibody   concentration that cannot be directly associated with clinical condition or   disease state.       Varicella Zoster Virus Antibody IgG   Date Value Ref Range Status   02/12/2020 2.0 (H) 0.0 - 0.8 AI Final     Comment:     Positive, suggests prev. exposure and probable  immunity  Antibody index (AI) values reflect qualitative changes in antibody   concentration that cannot be directly associated with clinical condition or   disease state.     02/08/2020 2.4 (H) 0.0 - 0.8 AI Final     Comment:     Positive, suggests prev. exposure and probable immunity  Antibody index (AI) values reflect qualitative changes in antibody   concentration that cannot be directly associated with clinical condition or   disease state.       Toxoplasma Antibody IgG   Date Value Ref Range Status   02/12/2020 <3.0 0.0 - 7.1 IU/mL Final     Comment:     Negative- Absence of detectable Toxoplasma gondii IgG antibodies. A negative   result does not rule out acute infection.  The magnitude of the measured result is not indicative of the amount of   antibody present. The concentrations of anti-Toxoplasma gondii IgG in a given   specimen determined with assays from different manufacturers can vary due to   differences in assay methods and reagent specificity.     02/08/2020 <3.0 0.0 - 7.1 IU/mL Final     Comment:     Negative- Absence of detectable Toxoplasma gondii IgG antibodies. A negative   result does not rule out acute infection.  The magnitude of the measured result is not indicative of the amount of   antibody present. The concentrations of anti-Toxoplasma gondii IgG in a given   specimen determined with assays from different manufacturers can vary due to   differences in assay methods and reagent specificity.         Imaging:  CT C/A/P without contrast 12/8/22:  IMPRESSION:   1. LVAD drive line with surrounding fluid in the along the tract in  the subcutaneous right anterior chest wall increased from previous.  Trace fluid surrounding the LVAD outflow tract , similar to prior. No  organized fluid collection or significant associated inflammatory  changes.  2. Left inguinal lymphadenopathy and stable prominent mediastinal  lymph nodes, likely reactive  3. Cholelithiasis without evidence of cholecystitis.  Mild intrahepatic  biliary ductal dilation.  4. Trace ascites      Video-Visit Details    Type of service:  Video Visit    Video Start Time (time video started): 9:01 AM    Video End Time (time video stopped): 9:21 AM    Originating Location (pt. Location): Other Local infusion center        Distant Location (provider location):  On-site    Mode of Communication:  Video Conference via [employer]

## 2023-03-10 NOTE — LETTER
3/10/2023       RE: Eliseo Tanner  [address] [place] [address]     Dear Colleague,    Thank you for referring your patient, Eliseo Tanner, to the [facility] INFECTIOUS DISEASE CLINIC [place] at [facility]. Please see a copy of my visit note below.    [facility] [place] [facility]  LVAD ID clinic      Patient:  Eliseo Tanner, Date of birth 1955, Medical record number [id number]  Date of Visit:  03/10/2023          Assessment and Recommendations:   ID Problem List  1. Chronic VAD driveline infection   1. Presented to OSH on 12/7 which grew MRSA and transferred to [facility] on 12/9/22  2. s/p I&D of fluid collection on 12/11/22; Cx +MSSA (tetra-R)  3. Was on PO suppression with Bactrim PTA pior but now on dapto since 12/2022  4. Latest cultures from 3/2/23 with Pseudomonas as well as MSSA  2. History of MSSA bacteremia 11/2020 secondary to MSSA driveline infection  3. History of pre-op Proteus and Enterococcus bacteremia  4. History of severe Legionella pneumonia (serogroup 1L) c/b cardiogenic shock, renal failure requiring CRRT, and resp failure requiring intubation, s/p azithromycin   5. CKD stage 3     RECOMMENDATION:  - Continue Daptomycin at 6mg/kg IV q24h, given increased amount of drainage  - Start Ciprofloxacin 750mg q12h PO (anti-Pseudomonas, CrCl 50-57 ml/min)  - Obtain CT A/P to evaluate for fluid collection around driveline  - If no recurrent fluid collection, will continue current antibiotics, have sent for Dalbavancin testing so that is an option  - Another option is switching IV Dapto to PO Bactrim to cover MSSA - this will eliminate need for line  - If patient has fevers, recommend getting blood cultures x 2  - Follow up in 2 weeks    Assessment:  Patient with chronic LVAD driveline infection with MSSA and MRSA s/p I&D 12/11/22 with a slowly healing wound which required a wound vac until recently. Patient has been on a prolonged course  "of Daptomycin which was planned to be continued initially until wound completely healed. However, over the last 2 weeks now noted to have clinical worsening, regression in wound healing and more drainage. Cultures from 3/2 with MSSA x 2, one strain with high Dapto DONTA (4) and Pseudomonas x 2. Not on anti-Pseudomonals yet. Will start Cipro today. Continue Dapto for now. Given amount of drainage, plan to obtain repeat imaging to r/o fluid collection.     Time spent on date of encounter between chart review, video visit (20 mins) and documentation = 47 mins    ERIN Mullins  Staff Physician, Infectious Diseases  Pager [phone]        Interval History:   Patient was last seen in ID clinic 2/1/23. At that time, he was on Daptomycin and scheduled to remain on the same until wound healed. Over the last 2 weeks, per patient's wife, his wound which was previously healing (albeit slowly) started to regress - noted to have more redness, some tenderness along driveline and progressive drainage. Cultures obtained 3/2 - growing MSSA x 2 and 2 species of Pseudomonas aeruginosa. Patient has not started Cipro yet    He denies fevers, chills, rigors, night sweats. Ongoing issues with drainage as reported - greenish, purulent, \"goopy\". Denies cough, SOB. Denies nausea, vomiting, diarrhea    Does not have wound vac anymore         History of the Infectious Disease lllness:   Eliseo Tanner is a 67 year old male with past medical history significant for CHF from [facility]IC[facility] s/p HM3 and ICD placement (2/18/20), DM, CKD, chronic MSSA driveline infection on PO suppression, history of MSSA bacteremia (11/2020) 2/2 driveline infection, and pre-operative bacteremia (proteus, enterococcus) who was admitted 12/8/22 for increased driveline drainage and heart failure exacerbation. He recently completed course of IV cefazolin 10/21-11/18/22 for increased driveline drainage with MSSA (tetracycline R) on cultures. This improved but did not " fully resolve drainage per patient. He was transitioned to PO Bactrim for suppression on 11/19, renally adjusted to 1 single strength daily, but had increased purulent drainage noted 1 week later. Previous suppressive regimens of cephalexin and cefadroxil with similar breakthrough drainage despite in vitro susceptibilities. He went to a local facility on 12/7/22 where cultures were obtained and grew MRSA and got transferred to [facility] for LVAD infection.      On admission here he got a CT C/A/P with LVAD driveline showing surrounding fluid along tract increased from prior, no organized fluid collection or significant inflammatory changes and trace ascites. He went to the OR on 12/11/22 for I&D. Cultures grew MSSA.  He was started empirically on micafungin x1, vancomycin, and zosyn, then narrowed to cefazolin monotherapy when cultures returned with MSSA (tetracycline resistant). He was subsequently broadened to dapto a few days later given the OSH cultures from 12/7/22 growing MRSA. A wound vac was placed and started on a initial 4 week IV abx therapy with dapto.      He had a virtual ID clinic visit on 11/13/23. He has been on Dapto 6mg/kg since disharge and has been doing well. He has no complaints. He states that his drainage is better by about 75% and the wound is healing but not completely healed. He has no muscle pain, fevers, chills, sob, chest pain, abd pain, n/v/d or dysuria,      He is here again for an ID virtual visit on 1/31/23. He was reached over the video during his wound vac change. The wound is still has drainage but is getting better. The wound is closing up more as well.  Over video I could not appreciate any drainage. The wife and the wound nurse are happy with the progress. The wound measurement 7cm (Length)x 2cm (width)x.03cm (depth). He has no fevers, chills, sob, cough, n/v/d, muscle aches. His last CK on 1/24 is in normal range. No dysuria.       Transplants:  N/A; Coordinator Kay  Valerie    Review of Systems:  Remaining systems reviewed and negative    Outpatient Medications Marked as Taking for the 3/10/23 encounter (Virtual Visit) with [facility] LVAD   Medication Sig     allopurinol (ZYLOPRIM) 100 MG tablet 2 tablets (200 mg) by Oral or Feeding Tube route daily     amiodarone (PACERONE) 200 MG tablet TAKE 1 TABLET BY MOUTH ONCE DAILY     amLODIPine (NORVASC) 5 MG tablet Take 2 tablets (10 mg) by mouth daily     aspirin (ASA) 81 MG EC tablet Take 1 tablet (81 mg) by mouth daily     B Complex-C-Folic Acid (RENAL) 1 MG CAPS Take 1 capsule by mouth daily     ciprofloxacin (CIPRO) 750 MG tablet Take 1 tablet (750 mg) by mouth 2 times daily for 14 days     co-enzyme Q-10 200 MG CAPS 200 mg by Oral or Feeding Tube route daily     dapagliflozin (FARXIGA) 5 MG TABS tablet Take 5 mg by mouth daily     DAPTOmycin (CUBICIN) 500 MG injection Inject 500 mg into the vein daily     ferrous sulfate (FEROSUL) 325 (65 Fe) MG tablet Take 325 mg by mouth daily (with breakfast)     finasteride (PROSCAR) 5 MG tablet Take 1 tablet (5 mg) by mouth daily Helps with urinary retention.     FLUoxetine (PROZAC) 10 MG capsule Take 30 mg by mouth daily     hydrALAZINE (APRESOLINE) 100 MG tablet Take 1 tablet (100 mg) by mouth 3 times daily     hydrochlorothiazide (HYDRODIURIL) 25 MG tablet Take as instruction by the [facility] clinic     isosorbide mononitrate (IMDUR) 120 MG 24 HR ER tablet Take 1 tablet (120 mg) by mouth daily for 90 days     levothyroxine (SYNTHROID/LEVOTHROID) 88 MCG tablet Take 1 tablet (88 mcg) by mouth every morning (before breakfast)     magnesium oxide (MAG-OX) 400 MG tablet 1 tablet (400 mg) by Oral or Feeding Tube route 2 times daily     omeprazole (PRILOSEC) 20 MG DR capsule Take 20 mg by mouth daily     potassium chloride ER (KLOR-CON M) 20 MEQ CR tablet Take 3 tablets (60 mEq) by mouth 2 times daily for 90 days     senna-docusate (SENOKOT-S/PERICOLACE) 8.6-50 MG tablet Take 1 tablet by mouth 2 times daily  as needed for constipation     spironolactone (ALDACTONE) 25 MG tablet Take 1 tablet (25 mg) by mouth daily for 90 days     tamsulosin (FLOMAX) 0.4 MG capsule Take 0.8 mg by mouth every evening This med helps with urinary retention     torsemide (DEMADEX) 100 MG tablet Take 1 tablet (100 mg) by mouth 3 times daily for 90 days     warfarin ANTICOAGULANT (COUMADIN) 2 MG tablet Take 4 mg by mouth daily     zolpidem (AMBIEN) 5 MG tablet Take 5 mg by mouth nightly as needed for Insomnia       Allergies   Allergen Reactions     Heparin      HIT screen positive 2/14/20, reflex DAVINA negative; however heme recommended treating as if positive  HIT screen negative 2/11/20     Oxycodone Itching and Other (See Comments)     Chlorhexidine Rash              Physical Exam:     There were no vitals taken for this visit.  Wt Readings from Last 4 Encounters:   03/02/23 87.3 kg (192 lb 6.4 oz)   02/14/23 80.3 kg (177 lb 1.6 oz)   12/20/22 79.9 kg (176 lb 1.6 oz)   10/18/22 89 kg (196 lb 3.4 oz)       Exam: Limited exam as visit was conducted via [facility]: well-developed, well-nourished, alert, oriented, in no acute distress.  HEAD: Head is normocephalic, atraumatic   EYES: Eyes have anicteric sclerae.    NEUROLOGIC: Grossly nonfocal.  Abd wound/driveline exit site: wound with some erythema, copious purulent drainage around driveline exit site                 Laboratory Data:       Inflammatory Markers    Recent Labs   Lab Test 12/08/22  1256 08/17/21  0807 02/16/21  2219 02/15/21  1910 02/11/21  0838 12/17/20  0756 12/14/20  0815 11/05/20  0000 09/21/20  1438   SED 49*  --  72* 47*  --   --   --   --   --    CRP  --  4.9 270.0* 270.0* 8.6 8.0 6.1 54.9 6.6       Metabolic Studies    Recent Labs   Lab Test 03/02/23  1049 03/02/23  0832 02/20/23  1027 02/14/23  0723 02/13/23  1805 09/11/22  1132 09/11/22  1044 02/18/22  0845 02/10/22  0730 02/24/21  1855 02/24/21  0442   NA  --  133*  --  136 135*   < > 140   < > 138   < > 132*    POTASSIUM  --  3.6  --  3.7 3.7   < > 4.9   < > 4.2   < > 4.1   CHLORIDE  --  95* 96* 94* 92*   < > 100   < > 108   < > 99   CO2  --  26  --  31* 29   < > 29   < > 21   < > 25   ANIONGAP  --  12  --  11 14   < > 11   < > 9   < > 8   BUN  --  48.0*  --  43.4* 46.2*   < > 50.5*   < > 31*   < > 44*   CR  --  1.47*  --  1.71* 1.73*   < > 1.73*   < > 1.29*   < > 3.84*   59882  --   --  1.80*  --   --    < >  --    < >  --    < >  --    GFRESTIMATED  --  51*  --  43* 42*   < > 42*   < > 60*   < > 15*   GLC  --  123*  --  111* 155*   < > 181*   < > 131*   < > 125*   CALOS  --  8.7*  --  8.7* 8.6*   < > 9.3   < > 8.4*   < > 8.0*   PHOS  --   --   --   --   --   --  3.7   < >  --   --   --    MAG  --  2.3  --  2.4*  --    < > 2.2   < >  --    < >  --    URIC  --   --   --   --   --   --   --   --  4.2  --   --    LACT  --   --   --   --   --   --   --   --   --   --  0.7     --   --  50  --    < >  --   --   --   --   --     < > = values in this interval not displayed.       Hepatic Studies    Recent Labs   Lab Test 03/02/23  0832 02/07/23  1826 12/20/22  0408 11/15/20  0830 11/14/20  2041   BILITOTAL 0.9 0.5 0.4   < > 0.7   DBIL  --   --   --   --  0.4*   ALKPHOS 339* 310* 285*   < > 184*   PROTTOTAL 8.1 7.0 6.4   < > 7.8   ALBUMIN 3.5 3.3* 3.1*   < > 2.6*   * 122* 159*   < > 48*   ALT 88* 84* 81*   < > 56   * 288*  --    < >  --     < > = values in this interval not displayed.       Hematology Studies   Recent Labs   Lab Test 03/02/23  0832 02/14/23  0723 02/13/23  0556 02/12/23  0548 02/07/23  1826 02/07/23  1030 03/09/21  0510 03/07/21  0543 03/06/21  0430   WBC 10.1 9.6 9.3 8.9   < >  --    < > 4.5 4.4   85784  --   --   --   --   --  9.4   < >  --   --    ANEU  --   --   --   --   --   --   --  2.6 2.8   ANEUTAUTO  --  7.1 6.7 6.6   < >  --    < >  --   --    ALYM  --   --   --   --   --   --   --  0.9 0.7*   ALYMPAUTO  --  1.0 1.1 0.9   < >  --    < >  --   --    GIULIANO  --   --   --   --   --   --    --  0.7 0.6   AMONOAUTO  --  1.0 1.0 0.9   < >  --    < >  --   --    AEOS  --   --   --   --   --   --   --  0.3 0.2   AEOSAUTO  --  0.4 0.4 0.4   < >  --    < >  --   --    ABSBASO  --  0.1 0.1 0.1   < >  --    < >  --   --    HGB 12.1* 12.7* 13.1* 12.3*   < >  --    < > 7.9* 8.0*   17198  --   --   --   --   --  11.9*   < >  --   --    HCT 36.2* 41.2 41.9 40.2   < >  --    < > 25.9* 25.3*    336 349 349   < >  --    < > 208 196   08751  --   --   --   --   --  339   < >  --   --     < > = values in this interval not displayed.       Clotting Studies    Recent Labs   Lab Test 03/02/23  0832 02/21/23  1118 02/20/23  1027 02/14/23  0723 03/16/21  0551 03/15/21  0551   INR 1.92* 1.9* 1.9* 2.36*   < > 2.53*   PTT  --   --   --   --   --  37    < > = values in this interval not displayed.       Microbiology:    Last Culture results   Culture   Date Value Ref Range Status   03/02/2023 1+ Pseudomonas aeruginosa (A)  Preliminary   03/02/2023 1+ Pseudomonas aeruginosa (A)  Preliminary   03/02/2023 1+ Staphylococcus aureus (A)  Corrected     Comment:     Susceptibility testing requested by Sunni Jimenez pharmacist ([id number]) to check slightly elevated vanco result. 3.8.23 at 1047  Susceptibility testing requested by Sunni Jimenez, pharmacist for dalbavancin.    03/02/2023 1+ Staphylococcus aureus (A)  Preliminary   03/02/2023 No anaerobic organisms isolated  Final   12/11/2022 No anaerobic organisms isolated  Final   12/11/2022 No Growth  Final   12/11/2022 1+ Staphylococcus aureus (A)  Final     Comment:     Susceptibilities done on previous cultures   12/11/2022 1+ Staphylococcus aureus (A)  Final     Comment:     Susceptibilities done on previous cultures   12/08/2022 No Growth  Final   12/08/2022 2+ Staphylococcus aureus (A)  Final   12/08/2022 2+ Staphylococcus aureus (A)  Final   12/08/2022 No Growth  Final   10/18/2022 2+ Staphylococcus aureus (A)  Final   10/18/2022 2+ Staphylococcus aureus (A)  Final   09/20/2022 2+  Staphylococcus aureus (A)  Final   09/20/2022 1+ Normal freeman  Final   09/20/2022 No anaerobic organisms isolated  Final   02/10/2022 1+ Staphylococcus aureus (A)  Final   02/10/2022 1+ Cutibacterium (Propionibacterium) acnes (A)  Final     Comment:     Susceptibility testing of Cutibacterium (Propionibacterium) species is not done from this source. This organism is susceptible to penicillin and cefotaxime and most are susceptible to clindamycin.     Culture Micro   Date Value Ref Range Status   02/17/2021 No growth  Final   02/17/2021 No growth  Final   02/16/2021 Light growth  Enterococcus faecium   (A)  Final   02/16/2021 (A)  Final    Legionella pneumophila  isolated  Sent to [facility] for serotyping     02/16/2021   Final    Critical Value/Significant Value, preliminary result only, called to and read back by  Shobha Pedro RN on 2.23.21 at 1401. bw     02/16/2021 (A)  Final    Report received from the [place] [facility]. of Health:  Legionella pneumophila serogroup 1  This test was developed and its performance characteristics determined by the [facility].  It has not been cleared or approved by the U.S. Food and Drug   Administration:21CFR 809.30(e).  The FDA has determined that such clearance is not   necessary.     02/15/2021 Moderate growth  Staphylococcus aureus   (A)  Final   02/15/2021 Moderate growth  Strain 2  Staphylococcus aureus   (A)  Final   02/15/2021 Moderate growth  Strain 3  Staphylococcus aureus   (A)  Final   02/15/2021 Light growth  Normal skin freeman    Final         Last checks of Clostridioides difficile testing  Recent Labs   Lab Test 11/15/20  1445   CDBPCT Negative     Quantiferon testing   Recent Labs   Lab Test 02/14/23  0723 02/13/23  0556 02/13/20  1030 02/12/20  0440 02/08/20  0408   TBRES  --   --   --  Negative Negative   LYMPH 10 11   < > 4.8 17.5    < > = values in this interval not displayed.     Virology:  Coronavirus-19 testing    Recent Labs   Lab Test  02/10/23  1040 02/07/23  2221 12/08/22  1641 09/10/22  0844 09/02/22  1749 02/07/22  1456 01/03/22  1011 08/17/21  1229 03/15/21  1612 03/09/21  1335 01/05/21  1521 11/14/20  2140   TIM14KL  --   --   --   --   --   --   --   --  Positive  --   --   --    [id number]  --   --   --   --   --   --   --   --  1:100  --   --   --    [id number] Negative Negative Negative Negative   < >  --   --   --   --  NEGATIVE   < > Test received-See reflex to IDDL test SARS CoV2 (COVID-19) Virus RT-PCR  NEGATIVE   [id number]  --   --   --   --   --   --   --   --   --  Nasopharyngeal   < > Nasopharyngeal   [id number]  --   --   --   --   --   --   --   --   --   --   --  Nasopharyngeal   COVIDPCREXT  --   --   --   --   --  Detected*  Detected* POSITIVE*  POSITIVE*  --   --   --   --   --    [id number]  --   --   --   --   --   --   --  Negative  --   --   --   --    [id number]  --   --   --   --   --   --   --  <0.40  --   --   --   --     < > = values in this interval not displayed.       Hepatitis B Testing     Recent Labs   Lab Test 02/28/21  1155 02/12/20  0440 02/08/20  0408   AUSAB 0.00 0.69 1.99   HBCAB  --  Nonreactive Nonreactive   HEPBANG Nonreactive Nonreactive Nonreactive      Hepatitis C Antibody   Date Value Ref Range Status   02/12/2020 Nonreactive NR^Nonreactive Final     Comment:     Assay performance characteristics have not been established for newborns,   infants, and children     02/08/2020 Nonreactive NR^Nonreactive Final     Comment:     Assay performance characteristics have not been established for newborns,   infants, and children         CMV Antibody IgG   Date Value Ref Range Status   02/12/2020 >8.0 (H) 0.0 - 0.8 AI Final     Comment:     Positive  Antibody index (AI) values reflect qualitative changes in antibody   concentration that cannot be directly associated with clinical condition or   disease state.     02/08/2020 7.8 (H) 0.0 - 0.8 AI Final     Comment:     Positive  Antibody index (AI)  values reflect qualitative changes in antibody   concentration that cannot be directly associated with clinical condition or   disease state.       Varicella Zoster Virus Antibody IgG   Date Value Ref Range Status   02/12/2020 2.0 (H) 0.0 - 0.8 AI Final     Comment:     Positive, suggests prev. exposure and probable immunity  Antibody index (AI) values reflect qualitative changes in antibody   concentration that cannot be directly associated with clinical condition or   disease state.     02/08/2020 2.4 (H) 0.0 - 0.8 AI Final     Comment:     Positive, suggests prev. exposure and probable immunity  Antibody index (AI) values reflect qualitative changes in antibody   concentration that cannot be directly associated with clinical condition or   disease state.       Toxoplasma Antibody IgG   Date Value Ref Range Status   02/12/2020 <3.0 0.0 - 7.1 IU/mL Final     Comment:     Negative- Absence of detectable Toxoplasma gondii IgG antibodies. A negative   result does not rule out acute infection.  The magnitude of the measured result is not indicative of the amount of   antibody present. The concentrations of anti-Toxoplasma gondii IgG in a given   specimen determined with assays from different manufacturers can vary due to   differences in assay methods and reagent specificity.     02/08/2020 <3.0 0.0 - 7.1 IU/mL Final     Comment:     Negative- Absence of detectable Toxoplasma gondii IgG antibodies. A negative   result does not rule out acute infection.  The magnitude of the measured result is not indicative of the amount of   antibody present. The concentrations of anti-Toxoplasma gondii IgG in a given   specimen determined with assays from different manufacturers can vary due to   differences in assay methods and reagent specificity.         Imaging:  CT C/A/P without contrast 12/8/22:  IMPRESSION:   1. LVAD drive line with surrounding fluid in the along the tract in  the subcutaneous right anterior chest wall  increased from previous.  Trace fluid surrounding the LVAD outflow tract , similar to prior. No  organized fluid collection or significant associated inflammatory  changes.  2. Left inguinal lymphadenopathy and stable prominent mediastinal  lymph nodes, likely reactive  3. Cholelithiasis without evidence of cholecystitis. Mild intrahepatic  biliary ductal dilation.  4. Trace ascites      Video-Visit Details    Type of service:  Video Visit    Video Start Time (time video started): 9:01 AM    Video End Time (time video stopped): 9:21 AM    Originating Location (pt. Location): Other Local infusion center        Distant Location (provider location):  On-site    Mode of Communication:  Video Conference via [employer]

## 2023-03-10 NOTE — TELEPHONE ENCOUNTER
ANTICOAGULATION  MANAGEMENT     Interacting Medication Review    Interacting medication(s): Ciprofloxacin (Cipro) with warfarin.    Duration: 14 days  (3/10/23 to 3/24/23)    Indication: DLES infection    New medication?: Yes, interaction may increase INR and risk of bleeding. Closer INR monitoring recommended.        PLAN     Continue your current warfarin dose with next INR in 3 days        Summary  As of 3/10/2023    Full warfarin instructions:  4 mg every day   Next INR check:  3/13/2023             Telephone call with  [facility] who verbalizes understanding and agrees to plan and who agrees to plan and repeated back plan correctly    New recheck date updated on anticoagulation calendar     Plan made with [facility] Pharmacist Young Lauren, RN  Anticoagulation Clinic

## 2023-03-13 NOTE — PROGRESS NOTES
ANTICOAGULATION MANAGEMENT     Eliseo Tanner 67 year old male is on warfarin with supratherapeutic INR result. (Goal INR 1.7-2.3)    Recent labs: (last 7 days)     03/13/23  0000   INR 3.0*       ASSESSMENT       Source(s): Chart Review    Previous INR was Therapeutic last 2(+) visits    Medication, diet, health changes since last INR chart reviewed; pt on cipro for driveline inf x 14 days             PLAN     Recommended plan for ongoing change(s) affecting INR     Dosing Instructions: 2 partial holds then continue your current warfarin dose with next INR in 4 days       Summary  As of 3/13/2023    Full warfarin instructions:  3/13: 2 mg; 3/14: 2 mg; Otherwise 4 mg every day   Next INR check:  3/17/2023             Detailed voice message left for Eliseo with dosing instructions and follow up date.   Sent [employer] message with dosing and follow up instructions    Patient to recheck with home meter    Education provided:     Please call back if any changes to your diet, medications or how you've been taking warfarin    Goal range and lab monitoring: goal range and significance of current result    Contact [phone] with any changes, questions or concerns.     Plan made with [facility] Pharmacist Sarah Duenas, RN  Anticoagulation Clinic  3/13/2023    _______________________________________________________________________     Anticoagulation Episode Summary     Current INR goal:  1.7-2.3   TTR:  46.5 % (10.8 mo)   Target end date:  Indefinite   Send INR reminders to:  ANTICOAG LVAD    Indications    LVAD (left ventricular assist device) present (H) [Z95.811]  Chronic systolic congestive heart failure (H) [I50.22]  Paroxysmal atrial fibrillation (H) [I48.0]           Comments:  Follow VAD Anticoag protocol:Yes: HeartMate 3   Bridging: Call MD each time   Date VAD placed: 2/18/2020 5/6/22 [employer] Home Meter         Anticoagulation Care Providers     Provider Role Specialty Phone number    Nader Cisneros  MD Referring Cardiovascular Disease [phone]

## 2023-03-15 NOTE — TELEPHONE ENCOUNTER
amiodarone (PACERONE) 200 MG  Last Written Prescription Date:  4/10/22  Last Fill Quantity: 90,   # refills: 3  Last Office Visit : 3/2/23  Future Office visit:  3/23/23  Routing refill request to provider for review/approval because:  Drug not on the refill protocol

## 2023-03-16 NOTE — PROGRESS NOTES
[facility] - CardioMEMS Reading Review    March 16, 2023, 1012   CardioMems period: 2/17/23 -  3/19/2023      CardioMEMS reviewed and routed to patient's provider, Laine BARRY NP.     Current diuretic:  Torsemide 100 TID with PRN Hydrochlorothiazide, Spironolactone 12.5mg daily  Current potassium chloride dose:  Potassium 60mEq BID    Symptoms: Pt denies.  Weights: Today, going back: 187, 186, 184, 187      Changes to plan of care today: No changes.    Current Threshold parameters: 21 - 24    Last Waveform:       Readings:         RHC Date Wedge Pressure MEMS PAD during cath  (average of the 3 values)     Sept 20, 2022 w/MEMS implant     15 mmHg   16 mmHg

## 2023-03-17 NOTE — TELEPHONE ENCOUNTER
[facility] Call Center    Phone Message    May a detailed message be left on voicemail: yes     Reason for Call: Other: Alan Vegas would like to know if the team has come up with a plan yet from the lab info that they had recieved? Alan Vegas would like to know how the team would like to move forward. Please call Shasta back to keep her in the loop. Thank you!     Action Taken: Message routed to:  [facility] ID    Travel Screening: Not Applicable

## 2023-03-20 NOTE — TELEPHONE ENCOUNTER
LM for pharmacist Sheila to call back regarding plan.     3/10 visit:   RECOMMENDATION:  - Continue Daptomycin at 6mg/kg IV q24h, given increased amount of drainage  - Start Ciprofloxacin 750mg q12h PO (anti-Pseudomonas, CrCl 50-57 ml/min)  - Obtain CT A/P to evaluate for fluid collection around driveline  - If no recurrent fluid collection, will continue current antibiotics, have sent for Dalbavancin testing so that is an option  - Another option is switching IV Dapto to PO Bactrim to cover MSSA - this will eliminate need for line  - If patient has fevers, recommend getting blood cultures x 2  - Follow up in 2 weeks    Future Appointments   Date Time Provider Department Center   3/23/2023  7:30 AM  LAB [facility]   3/23/2023  8:00 AM  CV DEVICE 1 [facility]   3/23/2023  8:30 AM [facility]   3/28/2023  2:30 PM [facility]   6/23/2023 12:00 AM  ICD REMOTE [facility]

## 2023-03-20 NOTE — PROGRESS NOTES
ANTICOAGULATION  MANAGEMENT     Interacting Medication Review    Interacting medication(s): Ciprofloxacin (Cipro) with warfarin.    Duration: 14 days  (3/20 to 4/3)= Patient currently on Cipro and this was a refill    Indication: Drive-line infection    New medication?: currently monitoring patient closely and changed maintenance dose on 3/13        PLAN     No adjustment to anticoagulation plan needed; appropriate follow up already scheduled           Patient was not contacted    No adjustment to anticoagulation calendar was required    Plan made per [facility] anticoagulation protocol    Luiza Perkins RN  Anticoagulation Clinic

## 2023-03-20 NOTE — PROGRESS NOTES
.[facility] - CardioMEMS Reading Review    October 20, 2022   Cardiomems period: 2/17/23 - 3/19/23    CardioMEMS reviewed and routed to patient's provider, Laine BARRY NP.     Current diuretic dose: Torsemide 100 TID with PRN Hydrochlorothiazide, Spironolactone 12.5mg daily  Current potassium chloride dose:  Potassium 60mEq BID     Recent plan of care changes: Pt transitioned from Bumex to Torsemide.  Hydrochlorothiazide  Changed to prn.    Current Threshold parameters: 21 - 24    Today's Waveform:       Reading Ranges:           RHC Date Wedge Pressure MEMS PAD during cath  (average of the 3 values)     Sept 20, 2022 w/MEMS implant     15 mmHg   16 mmHg

## 2023-03-22 NOTE — PROGRESS NOTES
Remote monitoring of Pulmonary Artery Pressures via CardioMEMS device reviewed at least weekly and as needed with nursing for dates 2/17/23 through 3/19/23. Diuretics adjusted accordingly.   Laine Leon, APRN CNP  3/22/2023

## 2023-03-22 NOTE — TELEPHONE ENCOUNTER
Dr. Philip,     Pharmacist at [facility] was asking if Eliseo needed EKG done prior to appt next week?     Spoke with Sheila at the [facility], she requested orders for weekly vascular access care, labs, daily LVAD dressing changes and extension of abx's. She also inquired if Pt was suppose to get a EKG done as well?     Future Appointments   Date Time Provider Department Center   3/23/2023  7:30 AM  LAB [facility]   3/23/2023  8:00 AM  CV DEVICE 1 [facility]   3/23/2023  8:30 AM Mervat Decker PA-C [facility]   3/28/2023  2:30 PM [facility]   6/23/2023 12:00 AM  ICD REMOTE [facility]     Sent fax for EKG, labs and dressing changes to be done.

## 2023-03-23 PROBLEM — E87.70 HYPERVOLEMIA, UNSPECIFIED HYPERVOLEMIA TYPE: Status: ACTIVE | Noted: 2022-01-01

## 2023-03-23 NOTE — PATIENT INSTRUCTIONS
It was a pleasure to see you in clinic today.  Please do not hesitate to call with any questions or concerns.  You are scheduled for a remote transmission on 6/23/23.  We look forward to seeing you in clinic at your next device check in 6 months.    Kay Mclean, RN, BSN  Electrophysiology Nurse Clinician  [facility]    During Business Hours Please Call:  [phone]  After Hours Please Call:  [phone] - select option #[phone] and ask for job code [id number]

## 2023-03-23 NOTE — H&P
[facility]    Cardiology History and Physical - Cardiology         Date of Admission:  3/23/2023    Assessment & Plan: S    Eliseo Tanner is a 67 year old male with chronic systolic heart failure secondary to NICM now s/p HM 3 on 2/18, moderate CAD, HTN, ABHINAV on CPAP, DM2, CKD Stage III, ANA. His HM3 post-op course was complicated by retrosternal hematoma and bleeding in the lungs, RV failure,VT in ICU now on amiodarone and Afib w/AVR S/p DCCV on 2/28. He had pre-op proteus and enterococcus bacteremia and he has had MSSA bacteremia as well, s/p abx; later on had LDH elevation 2/2 to legionella c/b renal failure requiring temporary dialysis (off iHD since 3/10/2021). He was admitted for volume overload management.       Chronic SCHF secondary to NICM s/p HM III with RV dysfunction. Implanted 2/18/20 and complicated by bleeding.   Presented in the morning to clinic for follow up of LVAD and volume overload. He has been struggling to maintain fluid off with 100mg torsemide TID. He has noticed worsening of edema for the past 2 weeks and increased in weight, although not specific. No LVAD alarms but frequent PI events on interrogation 3/23/23. We will continue to manage his volume status for optimization.     Stage D, NYHA Class IIIA  -ACEi/ARB Hydralazine 100 mg TID. Continue amlodipine to 10 mg daily  -BB Has been deferred given RHF, deferred d/t bradycardia now  -Aldosterone antagonist deferred while other medical therapy is prioritized  -SCD prophylaxis CRT-D - interrogation ordered.   Fluid status Hypervolemic: 100mg torsemide TID is PTA regimen. Took am dose, receive 60mg furosemide and 7mg bumex with appropriate response. -Give 5mg bumex followed by 2mg/hr gtt    -Strict I/Os   -1800ml FR   -Daily Weights    -BID BMP + Mg    K>4, Mg>2  MAP: Goal 65-85, 85 in the ED  LDH: 269, stable  Anticoagulation: Goal 1.7-2.3 for recurrent nose-bleeding.    Antiplatelet: ASA 81 mg po daily  CardioMems: PAD goal is 21-24.     Heartmate 3 LEFT VS  Flow (Lpm): 5.2 Lpm  Pulse Index (PI): 4.8 PI  Speed (rpm): 5800 rpm  Power (muhammad): 3.3 muhammad     CKD stage III  Baseline has increased over the last year. Current B/l is around 1.8-2.2.   - Improved today to 1.42  - Diuretic management as above     MSSA Driveline infection, ongoing drainage, but improving  Following with ID. No acute concerns. Wounds unimpressive today.   - Continue PTA daily daptomycin (gets at his home hospital)   - Continue cipro 750 mg BID       Prolonged QTC, has multiple QTC prolonging medications. EKG obtained today, qtc is by read 584, then 643, but note that he is v-pace.  - Trend inpatient and adjust as needed     A. Fib.  Sinus Bradycardia  History of Afib w/ RVR and aberrancy vs. FDC vs. AT vs. Slow VT. S/p DCCV on 2/28 back into sinus rhythm. Has been tachycardic in the past but now in sinus bradycardia with increased RV pacing.  - Continue on amiodarone     HTN  Near goal today  - Continue hydralazine and amlodipine     History of HIT  - Avoid heparin products      Abnormal LFTs  - Has had elevated LFTS for some time, likely RV failure but need to consider medication effect as well, especially in the setting of amiodarone  -Will continue workup, likely to improve with diuresis.       Diet: Fluid restriction 1800 ML FLUID  Combination Diet Low Saturated Fat Na <2400mg Diet, No Caffeine Diet    DVT Prophylaxis: Warfarin  Guevara Catheter: Not present  Cardiac Monitoring: ACTIVE order. Indication: Acute decompensated heart failure (48 hours)  Code Status: Full Code          Clinically Significant Risk Factors Present on Admission             # Hypoalbuminemia: Lowest albumin = 3.2 g/dL at 3/23/2023  7:32 AM, will monitor as appropriate   # Drug Induced Coagulation Defect: home medication list includes an anticoagulant medication    # Obesity: Estimated body mass index is 31.48 kg/m  as calculated  "from the following:    Height as of this encounter: 1.702 m (5' 7\").    Weight as of this encounter: 91.2 kg (201 lb).       Disposition Plan   Expected discharge: 2 - 3 days, recommended to prior living arrangement once fluid volume status optimized on oral medication.    Entered: German Velez Reyes, MD 03/23/2023, 5:15 PM   The patient's care was discussed with the Attending Physician, Dr. Cisneros..      German Velez Reyes, MD, PhD  Internal Medicine PGY1  [facility]    ______________________________________________________________________    Chief Complaint   Volume Overload     History is obtained from the patient    History of Present Illness   Eliseo Tanner is a 67 year old male comes in today after being seen in LVAD clinic today for volume overload management. He says that starting about two weeks ago he noticed worsening bilateral leg and abdominal edema with eight increase. He has been taking 100mg torsemide TID with no improvement of edema. He denies SOB at rest or with activity. He is able to sleep flat, no PND/Orthopnea, no chest pain. Denies LVAD alarms.     He has noted that his LVAD driveline infection has been improving and denies any changes.       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 16930 and ;  Surgeon: Dalton Baeza MD;  Location:  HEART CARDIAC CATH LAB     CV CENTRAL VENOUS CATHETER PLACEMENT N/A 02/13/2020    Procedure: Central Venous Catheter Placement;  Surgeon: Chente Moss MD;  Location:  HEART CARDIAC CATH LAB     CV INTRA AORTIC BALLOON N/A 02/07/2020    Procedure: Intra-Aortic Balloon Pump Insertion;  Surgeon: Jose Badlwin MD;  Location:  HEART CARDIAC CATH LAB     CV INTRA AORTIC BALLOON N/A 02/13/2020    Procedure: Intra-Aortic Balloon Pump Insertion;  Surgeon: Chente Moss MD;  Location:  HEART CARDIAC CATH LAB     CV RIGHT HEART CATH MEASUREMENTS RECORDED N/A 09/21/2020    Procedure: CV RIGHT HEART CATH;  Surgeon: Dalton Baeza MD;  Location:  HEART CARDIAC CATH LAB     CV RIGHT HEART CATH MEASUREMENTS RECORDED N/A 01/07/2021    Procedure: Right Heart Cath;  Surgeon: Chun Ball MD;  Location:  HEART CARDIAC CATH LAB     CV RIGHT HEART CATH MEASUREMENTS RECORDED N/A 02/10/2022    Procedure: CV RIGHT HEART CATH;  Surgeon: Dalton Baeza MD;  Location:  HEART CARDIAC CATH LAB     CV RIGHT HEART CATH MEASUREMENTS RECORDED N/A 09/20/2022    Procedure: Right Heart Catheterization [[id number]];  Surgeon: Dalton Baeza MD;  Location:  HEART CARDIAC CATH LAB     CV RIGHT HEART CATH MEASUREMENTS RECORDED N/A 12/12/2022    Procedure: Right Heart Cath;  Surgeon: Chente Moss MD;  Location:  HEART CARDIAC CATH LAB     CV SWAN LUCIANA PROCEDURE N/A 02/13/2020    Procedure: [place] Luciana Procedure;  Surgeon: Chente Moss MD;  Location:  HEART CARDIAC CATH LAB     EP ICD Bilateral 03/16/2020    Procedure: EP ICD;  Surgeon: Dali Day MD;  Location:  HEART CARDIAC CATH LAB     INCISION AND DRAINAGE CHEST WASHOUT, COMBINED N/A 12/11/2022    Procedure: INCISION AND DRAINAGE OF DRIVELINE;   Surgeon: Mac Jaramillo MD;  Location: [facility] OR     INSERT VENTRICULAR ASSIST DEVICE LEFT (HEARTMATE II) N/A 2020    Procedure: INSERTION, LEFT VENTRICULAR ASSIST DEVICE (HEARTMATE III);  Surgeon: Mac Jaramillo MD;  Location: [facility] OR     IR CVC TUNNEL PLACEMENT > 5 YRS OF AGE  2021     IR CVC TUNNEL REMOVAL RIGHT  2021     MIDLINE INSERTION - DOUBLE LUMEN Left 12/15/2022    left basilic 5 fr dl midline 20 cm     THORACOSCOPY Right 2020    Procedure: RIGHT VIDEO-ASSISTED THORASCOPIC SURGERY, EVACUATION OF HEMOTHORAX, PLACEMENT OF CHEST TUBES;  Surgeon: William Gan MD;  Location: [facility] OR       Prior to Admission Medications   Prior to Admission Medications   Prescriptions Last Dose Informant Patient Reported? Taking?   B Complex-C-Folic Acid (RENAL) 1 MG CAPS  Self No No   Sig: Take 1 capsule by mouth daily   DAPTOmycin (CUBICIN) 500 MG injection   Yes No   Sig: Inject 500 mg into the vein daily   FLUoxetine (PROZAC) 10 MG capsule  Self Yes No   Sig: Take 30 mg by mouth daily   PARoxetine (PAXIL) 10 MG tablet   Yes No   Sig: Take 20 mg by mouth daily   allopurinol (ZYLOPRIM) 100 MG tablet  Self No No   Si tablets (200 mg) by Oral or Feeding Tube route daily   amLODIPine (NORVASC) 5 MG tablet  Self No No   Sig: Take 2 tablets (10 mg) by mouth daily   amiodarone (PACERONE) 200 MG tablet   No No   Sig: TAKE 1 TABLET BY MOUTH ONCE DAILY   aspirin (ASA) 81 MG EC tablet  Self No No   Sig: Take 1 tablet (81 mg) by mouth daily   ciprofloxacin (CIPRO) 750 MG tablet   No No   Sig: Take 1 tablet (750 mg) by mouth 2 times daily   co-enzyme Q-10 200 MG CAPS  Self Yes No   Si mg by Oral or Feeding Tube route daily   dapagliflozin (FARXIGA) 5 MG TABS tablet  Self Yes No   Sig: Take 5 mg by mouth daily   ferrous sulfate (FEROSUL) 325 (65 Fe) MG tablet   Yes No   Sig: Take 325 mg by mouth daily (with breakfast)   finasteride (PROSCAR) 5 MG tablet  Self No No   Sig: Take 1 tablet (5 mg)  by mouth daily Helps with urinary retention.   hydrALAZINE (APRESOLINE) 100 MG tablet  Self No No   Sig: Take 1 tablet (100 mg) by mouth 3 times daily   hydrochlorothiazide (HYDRODIURIL) 25 MG tablet   No No   Sig: Take as instruction by the VAD clinic   Patient not taking: Reported on 3/23/2023   isosorbide mononitrate (IMDUR) 120 MG 24 HR ER tablet   No No   Sig: Take 1 tablet (120 mg) by mouth daily for 90 days   levothyroxine (SYNTHROID/LEVOTHROID) 88 MCG tablet   No No   Sig: Take 1 tablet (88 mcg) by mouth every morning (before breakfast)   magnesium oxide (MAG-OX) 400 MG tablet   Yes No   Si tablet (400 mg) by Oral or Feeding Tube route 2 times daily   omeprazole (PRILOSEC) 20 MG DR capsule  Self Yes No   Sig: Take 20 mg by mouth daily   potassium chloride ER (KLOR-CON M) 20 MEQ CR tablet   No No   Sig: Take 3 tablets (60 mEq) by mouth 2 times daily for 90 days   senna-docusate (SENOKOT-S/PERICOLACE) 8.6-50 MG tablet  Self Yes No   Sig: Take 1 tablet by mouth 2 times daily as needed for constipation   spironolactone (ALDACTONE) 25 MG tablet   No No   Sig: Take 1 tablet (25 mg) by mouth daily for 90 days   tamsulosin (FLOMAX) 0.4 MG capsule  Self Yes No   Sig: Take 0.8 mg by mouth every evening This med helps with urinary retention   torsemide (DEMADEX) 100 MG tablet   No No   Sig: Take 1 tablet (100 mg) by mouth 3 times daily for 90 days   warfarin ANTICOAGULANT (COUMADIN) 2 MG tablet   Yes No   Sig: Take 4 mg by mouth daily   zolpidem (AMBIEN) 5 MG tablet  Self Yes No   Sig: Take 5 mg by mouth nightly as needed for Insomnia      Facility-Administered Medications: None        Review of Systems    The 10 point Review of Systems is negative other than noted in the HPI or here.      Physical Exam   Vital Signs:   Temp src: Oral   Pulse: (S) 85 (doppler)   Resp: 20        Weight: 201 lbs 0 oz    GENERAL: Appears comfortable, in no acute distress. Resting in bed.   HEENT: Eye symmetrical, no discharge or  icterus bilaterally. Mucous membranes moist and without lesions.  CV: Hum of HM3, occasional S1S2. JVD to earlobe.   RESPIRATORY: Respirations regular, even, and unlabored. Lungs CTA throughout.   GI: Soft,  distended with normoactive bowel sounds. No tenderness, rebound, guarding.   EXTREMITIES: 3+ b/l peripheral pitting lower extremity edema. All extremities are warm and well perfused  NEUROLOGIC: Alert and interacting appropriately. No focal deficits.   MUSCULOSKELETAL: No joint swelling or tenderness.   SKIN: No jaundice. No rashes or lesions. Driveline dressing c/d/i.    Medical Decision Making       Please see A&P for additional details of medical decision making.      Data     I have personally reviewed the following data over the past 24 hrs:    10.1  \   12.1 (L)   / 273     135 (L) 97 (L) 44.0 (H) /  144 (H)   4.3 27 1.42 (H) \       ALT: 92 (H) AST: 148 (H) AP: 307 (H) TBILI: 0.6   ALB: 3.2 (L) TOT PROTEIN: 7.6 LIPASE: N/A       INR:  2.12 (H) PTT:  N/A   D-dimer:  N/A Fibrinogen:  N/A       Ferritin:  N/A % Retic:  N/A LDH:  269 (H)       Imaging results reviewed over the past 24 hrs:   Recent Results (from the past 24 hour(s))   Cardiac Device Check - In Clinic   Result Value    Date Time Interrogation Session [id number]    Implantable Pulse Generator  Medtronic    Implantable Pulse Generator Model [id number] Visia AF MRI VR    Implantable Pulse Generator Serial Number [id number]    Type Interrogation Session In Clinic    Clinic Name [facility]    Implantable Pulse Generator Type Defibrillator    Implantable Pulse Generator Implant Date 20200316    Implantable Lead  Medtronic    Implantable Lead Model 6935M Sprint Quattro Secure S MRI SureScan    Implantable Lead Serial Number [id number]    Implantable Lead Implant Date 20200316    Implantable Lead Polarity Type Tripolar Lead    Implantable Lead Location Detail 1 UNKNOWN    Implantable Lead Special  Function 62cm length    Implantable Lead Location Right Ventricle    Glenn Setting Mode (NBG Code) VVIR    Glenn Setting Lower Rate Limit 60    Glenn Setting Maximum Sensor Rate 115    Glenn Setting Hysterisis Rate DISABLED    Lead Channel Setting Sensing Polarity Bipolar    Lead Channel Setting Sensing Anode Location Right Ventricle    Lead Channel Setting Sensing Anode Terminal Ring    Lead Channel Setting Sensing Cathode Location Right Ventricle    Lead Channel Setting Sensing Cathode Terminal Tip    Lead Channel Setting Sensing Sensitivity 0.3    Lead Channel Setting Pacing Polarity Bipolar    Lead Channel Setting Pacing Anode Location Right Ventricle    Lead Channel Setting Pacing Anode Terminal Ring    Lead Channel Setting Sensing Cathode Location Right Ventricle    Lead Channel Setting Sensing Cathode Terminal Tip    Lead Channel Setting Pacing Pulse Width 0.4    Lead Channel Setting Pacing Amplitude 2    Lead Channel Setting Pacing Capture Mode Adaptive    Zone Setting Type Category VF    Zone Setting Detection Interval 270    Zone Setting Detection Beats Numerator 30    Zone Setting Detection Beats Denominator 40    Zone Setting Type Category VT    Zone Setting Detection Interval Blank    Zone Setting Type Category VT    Zone Setting Detection Interval 460    Zone Setting Type Category VT    Zone Setting Detection Interval 500    Zone Setting Type Category ATRIAL_FIBRILLATION    Zone Setting Type Category AT/AF    Lead Channel Impedance Value 399    Lead Channel Sensing Intrinsic Amplitude 10.1    Lead Channel Pacing Threshold Amplitude 0.75    Lead Channel Pacing Threshold Pulse Width 0.4    Battery Date Time of Measurements 70351752381508    Battery Status Middle of Service    Battery RRT Trigger 2.727    Battery Remaining Longevity 104    Battery Voltage 2.99    Capacitor Charge Type Reformation    Capacitor Last Charge Date Time 78230315175641    Capacitor Charge Time 3.773    Capacitor Charge Energy 18     Glenn Statistic Date Time Start 07321516183605    Glenn Statistic Date Time End 61265334512671    Glenn Statistic RV Percent Paced 81.03    Atrial Tachy Statistic Date Time Start 64745421375762    Atrial Tachy Statistic Date Time End 20230323080041    Atrial Tachy Statistic AT/AF [place] Percent 0    Therapy Statistic Recent Shocks Delivered 0    Therapy Statistic Recent Shocks Aborted 0    Therapy Statistic Recent ATP Delivered 0    Therapy Statistic Recent Date Time Start 25619035286304    Therapy Statistic Recent Date Time End 20230323080041    Therapy Statistic Total Shocks Delivered 0    Therapy Statistic Total Shocks Aborted 0    Therapy Statistic Total ATP Delivered 0    Therapy Statistic Total  Date Time Start 69668225156663    Therapy Statistic Total  Date Time End 83593396560443    Episode Statistic Recent Count 0    Episode Statistic Type Category AT/AF    Episode Statistic Recent Count 0    Episode Statistic Type Category SVT    Episode Statistic Recent Count 0    Episode Statistic Type Category VT    Episode Statistic Recent Count 0    Episode Statistic Type Category VF    Episode Statistic Recent Count 0    Episode Statistic Type Category VT    Episode Statistic Recent Count 0    Episode Statistic Type Category VT    Episode Statistic Recent Count 0    Episode Statistic Type Category VT    Episode Statistic Recent Date Time Start 76007661030599    Episode Statistic Recent Date Time End 96590177865313    Episode Statistic Recent Date Time Start 72569098789136    Episode Statistic Recent Date Time End 84059799983249    Episode Statistic Recent Date Time Start 89367893837127    Episode Statistic Recent Date Time End 02414010297147    Episode Statistic Recent Date Time Start 52056690795219    Episode Statistic Recent Date Time End 43537070526638    Episode Statistic Recent Date Time Start 63637817391533    Episode Statistic Recent Date Time End 08451925359496    Episode Statistic Recent Date Time Start  46129807705015    Episode Statistic Recent Date Time End 20230323080041    Episode Statistic Recent Date Time Start 20230119000000    Episode Statistic Recent Date Time End 20230323080041    Episode Statistic Total Count 233    Episode Statistic Type Category AT/AF    Episode Statistic Total Count 0    Episode Statistic Type Category SVT    Episode Statistic Total Count 0    Episode Statistic Type Category VT    Episode Statistic Total Count 0    Episode Statistic Type Category VF    Episode Statistic Total Count 0    Episode Statistic Type Category VT    Episode Statistic Total Count 0    Episode Statistic Type Category VT    Episode Statistic Total Count 2    Episode Statistic Type Category VT    Episode Statistic Total Date Time Start 17399449905718    Episode Statistic Total Date Time End 58213326396402    Episode Statistic Total Date Time Start 02945058920061    Episode Statistic Total Date Time End 20230323080041    Episode Statistic Total Date Time Start 05352510952393    Episode Statistic Total Date Time End 20230323080041    Episode Statistic Total Date Time Start 71472172131056    Episode Statistic Total Date Time End 14573722732303    Episode Statistic Total Date Time Start 85952117327060    Episode Statistic Total Date Time End 20230323080041    Episode Statistic Total Date Time Start 15998875541519    Episode Statistic Total Date Time End 20230323080041    Episode Statistic Total Date Time Start 39254862246548    Episode Statistic Total Date Time End 94373282913353    Narrative    Patient seen in clinic for evaluation and iterative programming of their ICD per MD orders.     Device: Medtronic [id number] Visia AF MRI VR  Normal device function  Mode: VVIR  bpm  : 81%  Intrinsic rhythm: VS 62 bpm  Thoracic Impedance: Near reference line.   Short V-V intervals: 0  Lead Trends Appear Stable: yes  Estimated battery longevity to RRT = 8.7 years.(7.6 years min-9.8 years max)/Battery voltage = 3.00 V.    Atrial Arrhythmia: None  AF [place]: 0  Anticoagulant: Warfarin  Ventricular Arrhythmia: None  Setting Changes: None  Patient has an appointment to see Mervat Decker PA-C today.   Plan: Remote transmissions occur automatically every 3 months with the next transmission scheduled for 6/23/23 and RTC in 6 months.  TERESA Mclean RN    Single lead ICD    I have reviewed and interpreted the device interrogation, settings, programming and nurse's summary. The device is functioning within normal device parameters. I agree with the current findings, assessment and plan.

## 2023-03-23 NOTE — PHARMACY-ANTICOAGULATION SERVICE
Clinical Pharmacy - Warfarin Dosing Consult     Pharmacy has been consulted to manage this patient s warfarin therapy.  Indication: LVAD/RVAD  Therapy Goal: Other - see comments (1.7-2.3)  Provider/Team: [facility]: [facility]  Warfarin Prior to Admission: Yes  Warfarin PTA Regimen: 4 mg daily (of note, did require reduced dose of 2 mg on 3/13 and 3/14 for elevated INR)  Significant drug interactions: ASA (increases bleeding risk), amiodarone and ciprofloxacin (can increase INR)  Recent documented change in oral intake/nutrition: Unknown    INR   Date Value Ref Range Status   03/23/2023 2.20 (H) 0.85 - 1.15 Final   03/23/2023 2.12 (H) 0.85 - 1.15 Final     Chromogenic Factor 10   Date Value Ref Range Status   02/23/2021 31 (L) 70 - 130 % Final     Comment:     Therapeutic Range:  A Chromogenic Factor 10 level of approximately 20-40%   inversely correlates with an INR of 2-3 for patients receiving Warfarin.   Chromogenic Factor 10 levels below 20% indicate an INR greater than 3 and   levels above 40% indicate an INR less than 2.       Recommend warfarin 3 mg today.  Pharmacy will monitor Eliseo Tanner daily and order warfarin doses to achieve specified goal.      Please contact pharmacy as soon as possible if the warfarin needs to be held for a procedure or if the warfarin goals change.      Mima De Jesus [facility]

## 2023-03-23 NOTE — PROGRESS NOTES
INR noted from current ED visit. INR 2.12. ACC to complete ADT when discharged.     PACO MARQUEZ RN-BC  Anticoagulation Clinic  [phone]

## 2023-03-23 NOTE — ED TRIAGE NOTES
Edema in bilateral legs. Has heart mate 3 LVAD     Triage Assessment     Row Name 03/23/23 1202       Triage Assessment (Adult)    Airway WDL WDL       Respiratory WDL    Respiratory WDL WDL       Skin Circulation/Temperature WDL    Skin Circulation/Temperature WDL WDL       Cardiac WDL    Cardiac WDL X   heartmate three       Peripheral/Neurovascular WDL    Peripheral Neurovascular WDL WDL;X   edema in legs       Cognitive/Neuro/Behavioral WDL    Cognitive/Neuro/Behavioral WDL WDL

## 2023-03-23 NOTE — TELEPHONE ENCOUNTER
Patient is in city having appts today and requested to have infusion done after appts. Around 9 am if possible.     Called patient to inform that [facility] is totally booked and they cannot work them in.    They are going to call infusion center back home and see if they can get in later today or if they will have to skip a dose today and resume tomorrow.       Vinnie Olivera RN  Infectious Disease on 3/23/2023 at 8:20 AM

## 2023-03-23 NOTE — NURSING NOTE
Chief Complaint   Patient presents with     Follow Up     Return VAD           Vitals were taken and medications reconciled.     Beau Gambino, EMT   8:19 AM

## 2023-03-23 NOTE — PROGRESS NOTES
[facility] - CardioMEMS Reading Review    March 23, 2023, 1537   CardioMems period: 3/20/2023 -  4/19/2023      CardioMEMS reviewed and routed to patient's provider, Laine BARRY NP.     Current diuretic:  Torsemide 100 TID with PRN Hydrochlorothiazide, Spironolactone 12.5mg daily  Current potassium chloride dose:  Potassium 60mEq BID    Symptoms: increased swelling and weights  Weights: Today, going back: 189, 190, 191    Changes to plan of care today: pt to be admitted for volume overload    Current Threshold parameters: 21 - 24    Yesterday's Waveform:     Readings:         RHC Date Wedge Pressure MEMS PAD during cath  (average of the 3 values)     Sept 20, 2022 w/MEMS implant     15 mmHg   16 mmHg

## 2023-03-23 NOTE — ED PROVIDER NOTES
ED Provider Note  [facility]      History     Chief Complaint   Patient presents with     Abdominal Pain     The history is provided by the patient and medical records.     Eliseo Tanner is a 67 year old male with a past medical history significant for HFrEF (LVEF 10-20% 2/7) secondary to NICM s/p HM3 LVAD, DM2, CKD III, HTN, hypothyroid who presents to the Emergency Department for evaluation of lower extremity swelling and weight gain.  Patient presented to his routine cardiology appointment at [facility] where he reported increased weight gain of approximately 20 pounds over the past 3 weeks.  Patient's cardiologist attempted to directly admit the patient to the hospital but was unable to due to hospital capacity.  Patient endorses increased swelling in his lower extremity as well as abdominal distention.  Patient states he is otherwise fine and denies fevers, cough, chest pain or shortness of breath.  Patient does have an LVAD in place but denies recent alarms.    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 14343 and ;  Surgeon: Dalton Baeza MD;  Location:  HEART CARDIAC CATH LAB     CV CENTRAL VENOUS CATHETER PLACEMENT N/A 02/13/2020    Procedure: Central Venous Catheter Placement;  Surgeon: Chente Moss MD;  Location:  HEART CARDIAC CATH LAB     CV INTRA AORTIC BALLOON N/A 02/07/2020    Procedure: Intra-Aortic Balloon Pump Insertion;  Surgeon: Jose Baldwin MD;  Location:  HEART CARDIAC CATH LAB     CV INTRA AORTIC BALLOON N/A 02/13/2020    Procedure: Intra-Aortic Balloon Pump Insertion;  Surgeon: Chente Moss MD;  Location:  HEART CARDIAC CATH LAB     CV RIGHT HEART CATH MEASUREMENTS RECORDED N/A 09/21/2020    Procedure: CV RIGHT HEART CATH;  Surgeon: Dalton Baeza MD;  Location:  HEART CARDIAC CATH LAB     CV RIGHT HEART CATH MEASUREMENTS RECORDED N/A 01/07/2021    Procedure: Right Heart Cath;  Surgeon: Chun Ball MD;  Location:  HEART CARDIAC CATH LAB     CV RIGHT HEART CATH MEASUREMENTS RECORDED N/A 02/10/2022    Procedure: CV RIGHT HEART CATH;  Surgeon: Dalton Baeza MD;  Location:  HEART CARDIAC CATH LAB     CV RIGHT HEART CATH MEASUREMENTS RECORDED N/A 09/20/2022    Procedure: Right Heart Catheterization [[id number]];  Surgeon: Dalton Baeza MD;  Location:  HEART CARDIAC CATH LAB     CV RIGHT HEART CATH MEASUREMENTS RECORDED N/A 12/12/2022    Procedure: Right Heart Cath;  Surgeon: Chente Moss MD;  Location:  HEART CARDIAC CATH LAB     CV SWAN LUCIANA PROCEDURE N/A 02/13/2020    Procedure: Bowdle Luciana Procedure;  Surgeon: Chente Moss MD;  Location:  HEART CARDIAC CATH LAB     EP ICD Bilateral 03/16/2020    Procedure: EP ICD;  Surgeon: Dali Day MD;  Location:  HEART CARDIAC CATH LAB     INCISION AND DRAINAGE CHEST WASHOUT, COMBINED N/A 12/11/2022    Procedure: INCISION AND DRAINAGE OF DRIVELINE;  Surgeon: Mac Jaramillo MD;  Location:  OR     INSERT  VENTRICULAR ASSIST DEVICE LEFT (HEARTMATE II) N/A 02/18/2020    Procedure: INSERTION, LEFT VENTRICULAR ASSIST DEVICE (HEARTMATE III);  Surgeon: Mac Jaramillo MD;  Location: [facility] OR     IR CVC TUNNEL PLACEMENT > 5 YRS OF AGE  02/23/2021     IR CVC TUNNEL REMOVAL RIGHT  03/16/2021     MIDLINE INSERTION - DOUBLE LUMEN Left 12/15/2022    left basilic 5 fr dl midline 20 cm     THORACOSCOPY Right 03/06/2020    Procedure: RIGHT VIDEO-ASSISTED THORASCOPIC SURGERY, EVACUATION OF HEMOTHORAX, PLACEMENT OF CHEST TUBES;  Surgeon: William Gan MD;  Location: [facility] OR     allopurinol (ZYLOPRIM) 100 MG tablet  amiodarone (PACERONE) 200 MG tablet  amLODIPine (NORVASC) 5 MG tablet  aspirin (ASA) 81 MG EC tablet  B Complex-C-Folic Acid (RENAL) 1 MG CAPS  ciprofloxacin (CIPRO) 750 MG tablet  co-enzyme Q-10 200 MG CAPS  dapagliflozin (FARXIGA) 5 MG TABS tablet  DAPTOmycin (CUBICIN) 500 MG injection  ferrous sulfate (FEROSUL) 325 (65 Fe) MG tablet  finasteride (PROSCAR) 5 MG tablet  FLUoxetine (PROZAC) 10 MG capsule  hydrALAZINE (APRESOLINE) 100 MG tablet  hydrochlorothiazide (HYDRODIURIL) 25 MG tablet  isosorbide mononitrate (IMDUR) 120 MG 24 HR ER tablet  levothyroxine (SYNTHROID/LEVOTHROID) 88 MCG tablet  magnesium oxide (MAG-OX) 400 MG tablet  omeprazole (PRILOSEC) 20 MG DR capsule  PARoxetine (PAXIL) 10 MG tablet  potassium chloride ER (KLOR-CON M) 20 MEQ CR tablet  senna-docusate (SENOKOT-S/PERICOLACE) 8.6-50 MG tablet  spironolactone (ALDACTONE) 25 MG tablet  tamsulosin (FLOMAX) 0.4 MG capsule  torsemide (DEMADEX) 100 MG tablet  warfarin ANTICOAGULANT (COUMADIN) 2 MG tablet  zolpidem (AMBIEN) 5 MG tablet      Allergies   Allergen Reactions     Heparin      HIT screen positive 2/14/20, reflex DAVINA negative; however heme recommended treating as if positive  HIT screen negative 2/11/20     Oxycodone Itching and Other (See Comments)     Chlorhexidine Rash     Family History  No family history on file.  Social History   Social  "History     Tobacco Use     Smoking status: Former     Types: Cigarettes     Quit date: 1994     Years since quittin.9     Smokeless tobacco: Never   Substance Use Topics     Alcohol use: Not Currently     Drug use: Never      Past medical history, past surgical history, medications, allergies, family history, and social history were reviewed with the patient. No additional pertinent items.      A complete review of systems was performed with pertinent positives and negatives noted in the HPI, and all other systems negative.    Physical Exam   Pulse: (S) 85 (doppler)  Resp: 20  Height: 170.2 cm (5' 7\")  Weight: 91.2 kg (201 lb)  Physical Exam  Vitals and nursing note reviewed.   Constitutional:       General: He is not in acute distress.     Appearance: He is not ill-appearing, toxic-appearing or diaphoretic.   HENT:      Head: Normocephalic and atraumatic.   Eyes:      General: No scleral icterus.     Extraocular Movements: Extraocular movements intact.      Conjunctiva/sclera: Conjunctivae normal.   Cardiovascular:      Rate and Rhythm: Normal rate.      Comments: Machine like heart sounds  Pulmonary:      Effort: No respiratory distress.      Breath sounds: Normal breath sounds.   Abdominal:      General: Abdomen is flat.      Palpations: Abdomen is soft.      Tenderness: There is no abdominal tenderness. There is no guarding or rebound.   Musculoskeletal:         General: No tenderness.      Cervical back: Normal range of motion.      Right lower leg: Edema present.      Left lower leg: Edema present.   Skin:     General: Skin is warm.      Findings: No rash.   Neurological:      General: No focal deficit present.      Mental Status: He is alert and oriented to person, place, and time.      Cranial Nerves: No cranial nerve deficit.      Coordination: Coordination normal.   Psychiatric:         Mood and Affect: Mood normal.         Behavior: Behavior normal.         Thought Content: Thought content " normal.         Judgment: Judgment normal.           ED Course, Procedures, & Data     12:56 PM  The patient was seen and examined by Dr. Edin Adams in Room ED 23.     Procedures       ED Course Selections:        EKG Interpretation:      Interpreted by Edin Adams MD  Time reviewed: 12:19 PM  Symptoms at time of EKG: Fluid overload  Rhythm: paced  Rate: normal  Axis: LAD  Ectopy: none  Conduction: normal  ST Segments/ T Waves: No ST-T wave changes  Q Waves: none  Comparison to prior: Unchanged from old EKG done on February 12, 2023    Clinical Impression: Paced rhythm without acute ischemic changes                     Results for orders placed or performed during the hospital encounter of 03/23/23   Hermann Draw     Status: None    Narrative    The following orders were created for panel order Hermann Draw.  Procedure                               Abnormality         Status                     ---------                               -----------         ------                     Extra Blue Top Tube[[id number]]                              Final result               Extra Red Top Tube[[id number]]                               Final result               Extra Green Top (Lithium...[[id number]]                      Final result               Extra Purple Top Tube[[id number]]                            Final result                 Please view results for these tests on the individual orders.   Extra Blue Top Tube     Status: None   Result Value Ref Range    Hold Specimen JIC    Extra Red Top Tube     Status: None   Result Value Ref Range    Hold Specimen JIC    Extra Green Top (Lithium Heparin) Tube     Status: None   Result Value Ref Range    Hold Specimen JIC    Extra Purple Top Tube     Status: None   Result Value Ref Range    Hold Specimen JIC    EKG 12 lead     Status: None   Result Value Ref Range    Systolic Blood Pressure  mmHg    Diastolic Blood Pressure  mmHg    Ventricular Rate 64 BPM    Atrial Rate 66 BPM     NJ Interval  ms    QRS Duration 198 ms     ms    QTc 643 ms    P Axis  degrees    R AXIS 160 degrees    T Axis 118 degrees    Interpretation ECG       Ventricular-paced rhythm  Abnormal ECG  Unconfirmed report - interpretation of this ECG is computer generated - see medical record for final interpretation  Confirmed by - EMERGENCY ROOM, PHYSICIAN ([id number]),  ROMAINE HERNANDEZ ([id number]) on 3/23/2023 12:38:27 PM     Results for orders placed or performed in visit on 03/23/23   EKG 12-lead, tracing only (Same Day)     Status: None (Preliminary result)   Result Value Ref Range    Systolic Blood Pressure  mmHg    Diastolic Blood Pressure  mmHg    Ventricular Rate 62 BPM    Atrial Rate 27 BPM    NJ Interval  ms    QRS Duration 170 ms     ms    QTc 584 ms    P Axis  degrees    R AXIS -81 degrees    T Axis 75 degrees    Interpretation ECG       Ventricular-paced rhythm  Abnormal ECG  When compared with ECG of 12-FEB-2023 18:27,  Vent. rate has increased BY   2 BPM     Results for orders placed or performed in visit on 03/23/23   Cardiac Device Check - In Clinic     Status: None (In process)   Result Value Ref Range    Date Time Interrogation Session [id number]     Implantable Pulse Generator  Medtronic     Implantable Pulse Generator Model [id number] Visia AF MRI VR     Implantable Pulse Generator Serial Number [id number]     Type Interrogation Session In Clinic     Clinic Name [facility]     Implantable Pulse Generator Type Defibrillator     Implantable Pulse Generator Implant Date 20200316     Implantable Lead  Medtronic     Implantable Lead Model 6935M Sprint Quattro Secure S MRI SureScan     Implantable Lead Serial Number [id number]     Implantable Lead Implant Date 20200316     Implantable Lead Polarity Type Tripolar Lead     Implantable Lead Location Detail 1 UNKNOWN     Implantable Lead Special Function 62cm length     Implantable Lead Location Right  Ventricle     Glenn Setting Mode (NBG Code) VVIR     Glenn Setting Lower Rate Limit 60 [beats]/min    Glenn Setting Maximum Sensor Rate 115 [beats]/min    Glenn Setting Hysterisis Rate DISABLED     Lead Channel Setting Sensing Polarity Bipolar     Lead Channel Setting Sensing Anode Location Right Ventricle     Lead Channel Setting Sensing Anode Terminal Ring     Lead Channel Setting Sensing Cathode Location Right Ventricle     Lead Channel Setting Sensing Cathode Terminal Tip     Lead Channel Setting Sensing Sensitivity 0.3 mV    Lead Channel Setting Pacing Polarity Bipolar     Lead Channel Setting Pacing Anode Location Right Ventricle     Lead Channel Setting Pacing Anode Terminal Ring     Lead Channel Setting Sensing Cathode Location Right Ventricle     Lead Channel Setting Sensing Cathode Terminal Tip     Lead Channel Setting Pacing Pulse Width 0.4 ms    Lead Channel Setting Pacing Amplitude 2 V    Lead Channel Setting Pacing Capture Mode Adaptive     Zone Setting Type Category VF     Zone Setting Detection Interval 270 ms    Zone Setting Detection Beats Numerator 30 [beats]    Zone Setting Detection Beats Denominator 40 [beats]    Zone Setting Type Category VT     Zone Setting Detection Interval Blank     Zone Setting Type Category VT     Zone Setting Detection Interval 460 ms    Zone Setting Type Category VT     Zone Setting Detection Interval 500 ms    Zone Setting Type Category ATRIAL_FIBRILLATION     Zone Setting Type Category AT/AF     Lead Channel Impedance Value 399 ohm    Lead Channel Sensing Intrinsic Amplitude 10.1 mV    Lead Channel Pacing Threshold Amplitude 0.75 V    Lead Channel Pacing Threshold Pulse Width 0.4 ms    Battery Date Time of Measurements 23183238886014     Battery Status Middle of Service     Battery RRT Trigger 2.727     Battery Remaining Longevity 104 mo    Battery Voltage 2.99 V    Capacitor Charge Type Reformation     Capacitor Last Charge Date Time 93154178987412     Capacitor  Charge Time 3.773     Capacitor Charge Energy 18 J    Glenn Statistic Date Time Start 70562217964402     Glenn Statistic Date Time End 20230323080041     Glenn Statistic RV Percent Paced 81.03 %    Atrial Tachy Statistic Date Time Start 20221211104953     Atrial Tachy Statistic Date Time End 20230323080041     Atrial Tachy Statistic AT/AF [place] Percent 0 %    Therapy Statistic Recent Shocks Delivered 0     Therapy Statistic Recent Shocks Aborted 0     Therapy Statistic Recent ATP Delivered 0     Therapy Statistic Recent Date Time Start 20221211104953     Therapy Statistic Recent Date Time End 20230323080041     Therapy Statistic Total Shocks Delivered 0     Therapy Statistic Total Shocks Aborted 0     Therapy Statistic Total ATP Delivered 0     Therapy Statistic Total  Date Time Start 28930472443340     Therapy Statistic Total  Date Time End 20230323080041     Episode Statistic Recent Count 0     Episode Statistic Type Category AT/AF     Episode Statistic Recent Count 0     Episode Statistic Type Category SVT     Episode Statistic Recent Count 0     Episode Statistic Type Category VT     Episode Statistic Recent Count 0     Episode Statistic Type Category VF     Episode Statistic Recent Count 0     Episode Statistic Type Category VT     Episode Statistic Recent Count 0     Episode Statistic Type Category VT     Episode Statistic Recent Count 0     Episode Statistic Type Category VT     Episode Statistic Recent Date Time Start 58429649780211     Episode Statistic Recent Date Time End 97237217925865     Episode Statistic Recent Date Time Start 38769407516226     Episode Statistic Recent Date Time End 46939651116014     Episode Statistic Recent Date Time Start 36161039050871     Episode Statistic Recent Date Time End 93305807998822     Episode Statistic Recent Date Time Start 29674835114822     Episode Statistic Recent Date Time End 71104407524950     Episode Statistic Recent Date Time Start 40915536835566     Episode  Statistic Recent Date Time End 44949316757331     Episode Statistic Recent Date Time Start 39567447396842     Episode Statistic Recent Date Time End 73229284525288     Episode Statistic Recent Date Time Start 52982849179807     Episode Statistic Recent Date Time End 94439714041043     Episode Statistic Total Count 233     Episode Statistic Type Category AT/AF     Episode Statistic Total Count 0     Episode Statistic Type Category SVT     Episode Statistic Total Count 0     Episode Statistic Type Category VT     Episode Statistic Total Count 0     Episode Statistic Type Category VF     Episode Statistic Total Count 0     Episode Statistic Type Category VT     Episode Statistic Total Count 0     Episode Statistic Type Category VT     Episode Statistic Total Count 2     Episode Statistic Type Category VT     Episode Statistic Total Date Time Start 26115519442464     Episode Statistic Total Date Time End 49462482752995     Episode Statistic Total Date Time Start 19927517978471     Episode Statistic Total Date Time End 02874088156570     Episode Statistic Total Date Time Start 50173483210062     Episode Statistic Total Date Time End 14755380806173     Episode Statistic Total Date Time Start 11762735032773     Episode Statistic Total Date Time End 33551097599199     Episode Statistic Total Date Time Start 41839337865320     Episode Statistic Total Date Time End 67473822950734     Episode Statistic Total Date Time Start 62201721998648     Episode Statistic Total Date Time End 01748930177471     Episode Statistic Total Date Time Start 51202738954521     Episode Statistic Total Date Time End 19257564937541     Narrative    Patient seen in clinic for evaluation and iterative programming of their ICD per MD orders.     Device: Medtronic [id number] Visia AF MRI VR  Normal device function  Mode: VVIR  bpm  : 81%  Intrinsic rhythm: VS 62 bpm  Thoracic Impedance: Near reference line.   Short V-V intervals: 0  Lead Trends  Appear Stable: yes  Estimated battery longevity to RRT = 8.7 years.(7.6 years min-9.8 years max)/Battery voltage = 3.00 V.   Atrial Arrhythmia: None  AF [place]: 0  Anticoagulant: Warfarin  Ventricular Arrhythmia: None  Setting Changes: None  Patient has an appointment to see Mervat Decker PA-C today.   Plan: Remote transmissions occur automatically every 3 months with the next transmission scheduled for 6/23/23 and RTC in 6 months.  TERESA Mclean RN    Single lead ICD    I have reviewed and interpreted the device interrogation, settings, programming and nurse's summary. The device is functioning within normal device parameters. I agree with the current findings, assessment and plan.   Results for orders placed or performed in visit on 03/23/23   Comprehensive metabolic panel     Status: Abnormal   Result Value Ref Range    Sodium 135 (L) 136 - 145 mmol/L    Potassium 4.3 3.4 - 5.3 mmol/L    Chloride 97 (L) 98 - 107 mmol/L    Carbon Dioxide (CO2) 27 22 - 29 mmol/L    Anion Gap 11 7 - 15 mmol/L    Urea Nitrogen 44.0 (H) 8.0 - 23.0 mg/dL    Creatinine 1.42 (H) 0.67 - 1.17 mg/dL    Calcium 8.6 (L) 8.8 - 10.2 mg/dL    Glucose 144 (H) 70 - 99 mg/dL    Alkaline Phosphatase 307 (H) 40 - 129 U/L     (H) 10 - 50 U/L    ALT 92 (H) 10 - 50 U/L    Protein Total 7.6 6.4 - 8.3 g/dL    Albumin 3.2 (L) 3.5 - 5.2 g/dL    Bilirubin Total 0.6 <=1.2 mg/dL    GFR Estimate 53 (L) >60 mL/min/1.73m2   Lactate Dehydrogenase     Status: Abnormal   Result Value Ref Range    Lactate Dehydrogenase 269 (H) 0 - 250 U/L   INR     Status: Abnormal   Result Value Ref Range    INR 2.12 (H) 0.85 - 1.15   CBC with platelets and differential     Status: Abnormal   Result Value Ref Range    WBC Count 10.1 4.0 - 11.0 10e3/uL    RBC Count 4.41 4.40 - 5.90 10e6/uL    Hemoglobin 12.1 (L) 13.3 - 17.7 g/dL    Hematocrit 37.9 (L) 40.0 - 53.0 %    MCV 86 78 - 100 fL    MCH 27.4 26.5 - 33.0 pg    MCHC 31.9 31.5 - 36.5 g/dL    RDW 21.9 (H) 10.0 - 15.0 %     Platelet Count 273 150 - 450 10e3/uL    % Neutrophils 73 %    % Lymphocytes 9 %    % Monocytes 13 %    % Eosinophils 2 %    % Basophils 1 %    % Immature Granulocytes 2 %    NRBCs per 100 WBC 0 <1 /100    Absolute Neutrophils 7.5 1.6 - 8.3 10e3/uL    Absolute Lymphocytes 0.9 0.8 - 5.3 10e3/uL    Absolute Monocytes 1.3 0.0 - 1.3 10e3/uL    Absolute Eosinophils 0.2 0.0 - 0.7 10e3/uL    Absolute Basophils 0.1 0.0 - 0.2 10e3/uL    Absolute Immature Granulocytes 0.2 <=0.4 10e3/uL    Absolute NRBCs 0.0 10e3/uL   CBC with Platelets & Differential     Status: Abnormal    Narrative    The following orders were created for panel order CBC with Platelets & Differential.  Procedure                               Abnormality         Status                     ---------                               -----------         ------                     CBC with platelets and d...[[id number]]  Abnormal            Final result                 Please view results for these tests on the individual orders.     Medications   allopurinol (ZYLOPRIM) tablet 200 mg (has no administration in time range)   amiodarone (PACERONE) tablet 200 mg (has no administration in time range)   amLODIPine (NORVASC) tablet 10 mg (has no administration in time range)   aspirin EC tablet 81 mg (has no administration in time range)   ciprofloxacin (CIPRO) tablet 750 mg (has no administration in time range)   dapagliflozin (FARXIGA) tablet 5 mg (has no administration in time range)   ferrous sulfate (FEROSUL) tablet 325 mg (has no administration in time range)   finasteride (PROSCAR) tablet 5 mg (has no administration in time range)   FLUoxetine (PROzac) capsule 30 mg (has no administration in time range)   hydrALAZINE (APRESOLINE) tablet 100 mg (has no administration in time range)   isosorbide mononitrate CR (IMDUR) TB24 120 mg (has no administration in time range)   levothyroxine (SYNTHROID/LEVOTHROID) tablet 88 mcg (has no administration in time range)   magnesium  oxide (MAG-OX) tablet 400 mg (has no administration in time range)   omeprazole (priLOSEC) CR capsule 20 mg (has no administration in time range)   PARoxetine (PAXIL) tablet 20 mg (has no administration in time range)   potassium chloride ER (KLOR-CON M) CR tablet 60 mEq (has no administration in time range)   senna-docusate (SENOKOT-S/PERICOLACE) 8.6-50 MG per tablet 1 tablet (has no administration in time range)   tamsulosin (FLOMAX) capsule 0.8 mg (has no administration in time range)   zolpidem (AMBIEN) half-tab 2.5 mg (has no administration in time range)   Warfarin Dose Required Daily - Pharmacist Managed (has no administration in time range)   lidocaine 1 % 0.1-1 mL (has no administration in time range)   lidocaine (LMX4) cream (has no administration in time range)   sodium chloride (PF) 0.9% PF flush 3 mL (has no administration in time range)   sodium chloride (PF) 0.9% PF flush 3 mL (has no administration in time range)   melatonin tablet 3 mg (has no administration in time range)   Patient is already receiving anticoagulation with heparin, enoxaparin (LOVENOX), warfarin (COUMADIN)  or other anticoagulant medication (has no administration in time range)   glucose gel 15-30 g (has no administration in time range)     Or   dextrose 50 % injection 25-50 mL (has no administration in time range)     Or   glucagon injection 1 mg (has no administration in time range)   DAPTOmycin (CUBICIN) 500 mg in sodium chloride 0.9 % 100 mL intermittent infusion (has no administration in time range)   furosemide (LASIX) injection 60 mg (60 mg Intravenous $Given 3/23/23 1316)   bumetanide (BUMEX) injection 7 mg (7 mg Intravenous $Given 3/23/23 1317)     Labs Ordered and Resulted from Time of ED Arrival to Time of ED Departure - No data to display  XR Chest Port 1 View    (Results Pending)          Critical care was not performed.     Medical Decision Making  The patient's presentation was of moderate complexity (a chronic  illness mild to moderate exacerbation, progression, or side effect of treatment).    The patient's evaluation involved:  review of external note(s) from 1 sources (see separate area of note for details)  discussion of management or test interpretation with another health professional (see separate area of note for details)    The patient's management necessitated high risk (a decision regarding hospitalization).      Assessment & Plan    67-year-old patient on LVAD presenting with fluid overload.  Direct admission was attempted from the clinic, but no beds available.  I will give the patient a dose of IV Lasix.  I discussed the case with [facility] staff, Dr. Doe who recommended 7 mg of IV Bumex.  This was ordered.  Patient admitted to [facility] service.    I have reviewed the nursing notes. I have reviewed the findings, diagnosis, plan and need for follow up with the patient.    New Prescriptions    No medications on file       Final diagnoses:   Hypervolemia, unspecified hypervolemia type     I, Daniel Alves, am serving as a trained medical scribe to document services personally performed by Edin Adams MD, based on the provider's statements to me.      I, Edin Adams MD, was physically present and have reviewed and verified the accuracy of this note documented by Daniel Alves.     Edin Adams MD  [facility] EMERGENCY DEPARTMENT  3/23/2023     Edin Adams MD  03/23/23 [id number]

## 2023-03-23 NOTE — LETTER
3/23/2023      RE: Eliseo Tanner  [address] [place] [address]       Dear Colleague,    Thank you for the opportunity to participate in the care of your patient, Eliseo Tanner, at the [facility] [place] at [facility]. Please see a copy of my visit note below.    In person visit    HPI:   Eliseo Tanner is a 67 year old male with chronic systolic heart failure secondary to NICM now s/p HM 3 on 2/18, moderate CAD, HTN, ABHINAV on CPAP, DM2, CKD Stage III, ANA. His HM3 post-op course was complicated by retrosternal hematoma and bleeding in the lungs, RV failure,VT in ICU now on amiodarone and Afib w/AVR S/p DCCV on 2/28. He had pre-op proteus and enterococcus bacteremia and he has had MSSA bacteremia as well, s/p abx; later on had LDH elevation 2/2 to legionella c/b renal failure requiring temporary dialysis (off iHD since 3/10/2021).  He presents today for LVAD follow-up.    He was last seen in LVAD clinic by Dr. Cisneros 3/2023. He was being treated for a driveline infection. Still working on his volume- he was felt to be hypervolemic and he had had a recent admission.    This visit  No SOB at rest. He can walks a few blocks before MIRANDA. No orthopnea. No PND. Severe LE LE edema. Also abdominal edema. No lightheadedness, dizziness, pre-syncope or syncope. No palpitations. No chest pain. Weights 192 at home. When he left the hospital he was 177 lbs. His weight had stayed low for a long time after leaving the hospital but two weeks ago started to go up vry rapidly. He is watching is fluid intake very closely. Watching his salt.     No blood in the urine or blood in the stool. He had a nosebleed for 30-45.    Occasional headaches, get better with tylenol.. No stroke symptoms.    He has chronic driveline drainage- this is significantly decreased. He still has some opening around the driveine which was created for the I&D. No pain. No fevers or  chills.    No LVAD alarms.    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    SOCIAL HISTORY:  Social History     Socioeconomic History     Marital status:      Spouse name: Not on file     Number of children: Not on file     Years of education: Not on file     Highest education level: Not on file   Occupational History     Not on file   Social Needs     Financial resource strain: Not on file     Food insecurity     Worry: Not on file     Inability: Not on file     Transportation needs     Medical: Not on file     Non-medical: Not on file   Tobacco Use     Smoking status: Not on file   Substance and Sexual Activity     Alcohol use: Not on file     Drug use: Not on file     Sexual activity: Not on file   Lifestyle     Physical activity     Days per week: Not on file     Minutes per session: Not on file     Stress: Not on file   Relationships     Social connections     Talks on phone: Not on file     Gets together: Not on file     Attends Yazidi service: Not on file     Active member of club or organization: Not on file     Attends meetings of clubs or organizations: Not on file     Relationship status: Not on file     Intimate partner violence     Fear of current or ex partner: Not on file     Emotionally abused:  Not on file     Physically abused: Not on file     Forced sexual activity: Not on file   Other Topics Concern     Not on file   Social History Narrative     Not on file       CURRENT MEDICATIONS:  allopurinol (ZYLOPRIM) 100 MG tablet, 2 tablets (200 mg) by Oral or Feeding Tube route daily  amiodarone (PACERONE) 200 MG tablet, TAKE 1 TABLET BY MOUTH ONCE DAILY  amLODIPine (NORVASC) 5 MG tablet, Take 2 tablets (10 mg) by mouth daily  aspirin (ASA) 81 MG EC tablet, Take 1 tablet (81 mg) by mouth daily  B Complex-C-Folic Acid (RENAL) 1 MG CAPS, Take 1 capsule by mouth daily  ciprofloxacin (CIPRO) 750 MG tablet, Take 1 tablet (750 mg) by mouth 2 times daily  co-enzyme Q-10 200 MG CAPS, 200 mg by Oral or Feeding Tube route daily  dapagliflozin (FARXIGA) 5 MG TABS tablet, Take 5 mg by mouth daily  DAPTOmycin (CUBICIN) 500 MG injection, Inject 500 mg into the vein daily  ferrous sulfate (FEROSUL) 325 (65 Fe) MG tablet, Take 325 mg by mouth daily (with breakfast)  finasteride (PROSCAR) 5 MG tablet, Take 1 tablet (5 mg) by mouth daily Helps with urinary retention.  FLUoxetine (PROZAC) 10 MG capsule, Take 30 mg by mouth daily  hydrALAZINE (APRESOLINE) 100 MG tablet, Take 1 tablet (100 mg) by mouth 3 times daily  isosorbide mononitrate (IMDUR) 120 MG 24 HR ER tablet, Take 1 tablet (120 mg) by mouth daily for 90 days  levothyroxine (SYNTHROID/LEVOTHROID) 88 MCG tablet, Take 1 tablet (88 mcg) by mouth every morning (before breakfast)  magnesium oxide (MAG-OX) 400 MG tablet, 1 tablet (400 mg) by Oral or Feeding Tube route 2 times daily  omeprazole (PRILOSEC) 20 MG DR capsule, Take 20 mg by mouth daily  PARoxetine (PAXIL) 10 MG tablet, Take 20 mg by mouth daily  potassium chloride ER (KLOR-CON M) 20 MEQ CR tablet, Take 3 tablets (60 mEq) by mouth 2 times daily for 90 days  senna-docusate (SENOKOT-S/PERICOLACE) 8.6-50 MG tablet, Take 1 tablet by mouth 2 times daily as needed for constipation  spironolactone (ALDACTONE) 25 MG tablet,  "Take 1 tablet (25 mg) by mouth daily for 90 days  tamsulosin (FLOMAX) 0.4 MG capsule, Take 0.8 mg by mouth every evening This med helps with urinary retention  torsemide (DEMADEX) 100 MG tablet, Take 1 tablet (100 mg) by mouth 3 times daily for 90 days  warfarin ANTICOAGULANT (COUMADIN) 2 MG tablet, Take 4 mg by mouth daily  zolpidem (AMBIEN) 5 MG tablet, Take 5 mg by mouth nightly as needed for Insomnia  hydrochlorothiazide (HYDRODIURIL) 25 MG tablet, Take as instruction by the VAD clinic (Patient not taking: Reported on 3/23/2023)    No current facility-administered medications on file prior to visit.      ROS:   See HPI    EXAM:  BP (!) 86/0 (BP Location: Right arm, Patient Position: Sitting, Cuff Size: Adult Regular)   Pulse 72   Ht 1.675 m (5' 5.95\")   Wt 91.6 kg (201 lb 14.4 oz)   SpO2 97%   BMI 32.64 kg/m      GENERAL: Appears comfortable, in no acute distress.   HEENT: Eye symmetrical, no discharge or icterus bilaterally. Mucous membranes moist and without lesions.  CV: Hum of HM3, occasional S1S2. JVP >15.   RESPIRATORY: Respirations regular, even, and unlabored. Lungs CTA throughout.   GI: Soft, non distended with normoactive bowel sounds. No tenderness, rebound, guarding.   EXTREMITIES: 3+ b/l peripheral pitting lower extremity edema. All extremities are warm and well perfused  NEUROLOGIC: Alert and interacting appropriately. No focal deficits.   MUSCULOSKELETAL: No joint swelling or tenderness.   SKIN: No jaundice. No rashes or lesions. Driveline dressing c/d/i.      Labs - as reviewed in clinic with patient today:  CBC RESULTS:  Lab Results   Component Value Date    WBC 10.1 03/23/2023    WBC 7.9 03/19/2021    RBC 4.41 03/23/2023    RBC 3.88 03/19/2021    HGB 12.1 (L) 03/23/2023    HGB 10.1 (A) 03/19/2021    HCT 37.9 (L) 03/23/2023    HCT 32.2 03/19/2021    MCV 86 03/23/2023    MCV 83.0 03/19/2021    MCH 27.4 03/23/2023    MCH 26.0 03/19/2021    MCHC 31.9 03/23/2023    MCHC 31.4 03/19/2021    RDW " 21.9 (H) 03/23/2023    RDW 22.8 03/19/2021     03/23/2023     03/19/2021     03/17/2021       CMP RESULTS:  Lab Results   Component Value Date     (L) 03/23/2023     04/09/2021    POTASSIUM 4.3 03/23/2023    POTASSIUM 4.2 05/03/2022    POTASSIUM 4.2 04/09/2021    CHLORIDE 97 (L) 03/23/2023    CHLORIDE 97 (L) 03/13/2023    CHLORIDE 103 04/09/2021    CO2 27 03/23/2023    CO2 26 05/03/2022    CO2 28 04/09/2021    ANIONGAP 11 03/23/2023    ANIONGAP 11 05/03/2022    ANIONGAP 8 04/09/2021     (H) 03/23/2023    GLC 96 02/09/2023     (H) 05/03/2022     (A) 04/09/2021    BUN 44.0 (H) 03/23/2023    BUN 46 (H) 05/03/2022    BUN 35 04/09/2021    CR 1.42 (H) 03/23/2023    CR 1.6 04/09/2021    GFRESTIMATED 53 (L) 03/23/2023    GFRESTIMATED 34 03/19/2021    GFRESTBLACK 39 (L) 03/17/2021    CALOS 8.6 (L) 03/23/2023    CALOS 9.0 04/09/2021    BILITOTAL 0.6 03/23/2023    BILITOTAL 0.5 03/19/2021    ALBUMIN 3.2 (L) 03/23/2023    ALBUMIN 3.7 05/03/2022    ALBUMIN 4.4 03/19/2021    ALKPHOS 307 (H) 03/23/2023    ALKPHOS 138.0 03/19/2021    ALT 92 (H) 03/23/2023    ALT 35 03/19/2021     (H) 03/23/2023    AST 60.0 03/19/2021        INR RESULTS:  Lab Results   Component Value Date    INR 2.12 (H) 03/23/2023    INR 3.0 (A) 03/13/2023    INR 1.9 (H) 02/20/2023    INR 1.8 01/12/2023    INR 3.1 (A) 07/09/2021       Lab Results   Component Value Date    MAG 2.3 03/02/2023    MAG 1.5 (L) 03/17/2021     Lab Results   Component Value Date    NTBNPI 3,352 (H) 02/07/2023    NTBNPI 27,781 (H) 02/15/2021     Lab Results   Component Value Date    NTBNP 4,416 (H) 02/10/2022       Diagnostics    3/23/23 ICD check     Device: Medtronic [id number] Visia AF MRI VR  Normal device function  Mode: VVIR  bpm  : 81%  Intrinsic rhythm: VS 62 bpm  Thoracic Impedance: Near reference line.   Short V-V intervals: 0  Lead Trends Appear Stable: yes  Estimated battery longevity to RRT = 8.7 years.(7.6 years  min-9.8 years max)/Battery voltage = 3.00 V.   Atrial Arrhythmia: None  AF [place]: 0  Anticoagulant: Warfarin  Ventricular Arrhythmia: None  Setting Changes: None  Patient has an appointment to see Mervat Decker PA-C today.   Plan: Remote transmissions occur automatically every 3 months with the next transmission scheduled for 6/23/23 and RTC in 6 months.  TERESA Mclean RN     Single lead ICD    2/13/23 ECHO  Interpretation Summary  HM3 at 6000RPM.  LVAD cannula was seen in the expected anatomic position in the LV apex.  The LV is normal in size LVIDd 38 mm.  Aortic valve remains closed during the cardiac cycle, mild aortic  regurgitation.  Normal inflow and outflow velocities.  Mild tricuspid insufficiency is present.  Global right ventricular function is severely reduced.  No pericardial effusion is present.  ______________________________________________________________________________      12/12/22 RHC  RA: --/--/10  RV 42/9  PA 40/13/22  PCWP --/--/13  CO/CI: 3.24/1.66 by TD; 4.4/2.25 by Jose  PVR 2.04 (JOSE) camara    Ao sat 93%  PA sat 62%  HR 60   Right sided filling pressures are mildly elevated. Left sided filling pressures are normal. Mild elevated pulmonary hypertension. Normal cardiac output level. Hemodynamic data has been modified in Epic per physician review.    1/7/2021 RHC   Systolic (mmHg) Diastolic (mmHg) Mean (mmHg) A Wave (mmHg) V Wave (mmHg) EDP (mmHg) Max dp/dt (mmHg/sec) HR (bpm) Content (mL/dL) SAT (%)    RA Pressures  1:03 PM   5    4    9      84        RV Pressures  1:03 PM 24        5     84        PA Pressures  1:04 PM 24    7    13        88        PCW Pressures  1:04 PM   3        55          1/5/2021 ECHO    Interpretation Summary  Heartmate III LVAD, speed 5500 RPM     Left ventricular size is normal. Severely (EF 10-15%) reduced left ventricular  function is present.     LVAD cannula is not well seen in the LV apex. The outflow graft is well  visualized and Doppler is normal .      Mild to moderate right ventricular dilation is present. Global right  ventricular function is mildly to moderately reduced.     Aortic valve opens with every heart beat.     The inferior vena cava was normal in size with preserved respiratory  variability.     No pericardial effusion is present.      Assessment and Plan:   Eliseo Tanner is a 67 year old male with chronic systolic heart failure secondary to NICM now s/p HM 3 on 2/18, moderate CAD, HTN, ABHINAV on CPAP, DM2, CKD Stage III, ANA. His HM3 post-op course was complicated by retrosternal hematoma and bleeding in the lungs, RV failure,VT in ICU now on amiodarone and Afib w/AVR S/p DCCV on 2/28. He had pre-op proteus and enterococcus bacteremia and he has had MSSA bacteremia as well, s/p abx; later on had LDH elevation 2/2 to legionella c/b renal failure requiring temporary dialysis (off iHD since 3/10/2021).  He presents today for LVAD follow-up.    We have had significant challenges managing his volume of the last few years. He was doing well for a time after his speed increase, but this year has had a driveline infection requiring I&D and is now on IV abx and he has also had a recent admission for hypervolemia.    Today, he presents 15-20 lbs up from his last discharge. He has severe pitting edema in his legs. He is already on a very high dose of torsemide. We discussed doing daily bumex infusions with is IV daptomycin, but there is a high chance that once a day IV won't be enough to reverse his hypervolemia, so he has opted for hospitalization for diuresis. On discharge, I would consider weekly IV bumex infusion to help maintain his absorption.       Chronic SCHF secondary to NICM s/p HM III with RV dysfunction. Implanted 2/18 and complicated by bleeding.   Stage D, NYHA Class IIIA  ACEi/ARB Hydralazine 100 mg TID. Continue amlodipine to 10 mg daily  BB Has been deferred given RHF, deferred d/t bradycardia now  Aldosterone antagonist deferred while  other medical therapy is prioritized  SCD prophylaxis ICD  Fluid status Hypervolemic: Management per the ER and cards 2  MAP: Goal 65-85, he is-86 today  LDH: 269, stable  Anticoagulation: Coumadin per pharmacy.  Goal 1.8-2.5 for recurrent nosebleeding, INR 2.12- defer bridge, dosing per ACC team  Antiplatelet: ASA 81 mg po daily  Cardiomems goal: Yes, Pad goal is 21-24, within goal today despite juan hypervolemia, so may need to be adjusted    VAD Interrogation on March 23, 2023. VAD interrogation reviewed with VAD coordinator. Agree with findings. Frequent PI events. No power spikes, speed drops, or other findings suspicious of pump malfunction noted.    CKD stage III  Baseline has increased over the last year. Current B/l is around 1.8-2.2.   - Improved today to 1.42  - Diuretic management as above    MSSA Driveline infection, ongoing drainage, but improving  - Following with ID  - On daily daptomycin (gets at his home hospital)   - On cipro 750 mg BID      Prolonged QTC, has multiple QTC prolonging medications. EKG obtained today, qtc is by read 584, but note that he is v-pace.  - Trend inpatient and adjust as needed    A. Fib.  Sinus Bradycardia  History of Afib w/ RVR and aberrancy vs. NATHALIA vs. AT vs. Slow VT. S/p DCCV on 2/28 back into sinus rhythm. Has been tachycardic in the past but now in sinus bradycardia with increased RV pacing.  - Continue on amiodarone    HTN, near goal today  - Continue hydralazine and amlodipine    History of HIT  - Avoid heparin products     Abnormal LFTs  - Has had elevated LFTS for some time, likely RV failure but need to consider medication effect as well, especially in the setting of amiodarone  - further work-up per inpatient team    Follow up   - Per inpatient team  - On discharge, consider weekly IV bumex infusions    Billing  - I reviewed 3+ tests  - I managed 2+ stable chronic problems and one with severe progression prossing a risk of harm  - I made a decision regarding  hospitalization.    Mervat Decker PA-C          CC  BRANNON GAVIN

## 2023-03-23 NOTE — PROGRESS NOTES
In person visit    HPI:   Eliseo Tanner is a 67 year old male with chronic systolic heart failure secondary to NICM now s/p HM 3 on 2/18, moderate CAD, HTN, ABHINAV on CPAP, DM2, CKD Stage III, ANA. His HM3 post-op course was complicated by retrosternal hematoma and bleeding in the lungs, RV failure,VT in ICU now on amiodarone and Afib w/AVR S/p DCCV on 2/28. He had pre-op proteus and enterococcus bacteremia and he has had MSSA bacteremia as well, s/p abx; later on had LDH elevation 2/2 to legionella c/b renal failure requiring temporary dialysis (off iHD since 3/10/2021).  He presents today for LVAD follow-up.    He was last seen in LVAD clinic by Dr. Cisneros 3/2023. He was being treated for a driveline infection. Still working on his volume- he was felt to be hypervolemic and he had had a recent admission.    This visit  No SOB at rest. He can walks a few blocks before MIRANDA. No orthopnea. No PND. Severe LE LE edema. Also abdominal edema. No lightheadedness, dizziness, pre-syncope or syncope. No palpitations. No chest pain. Weights 192 at home. When he left the hospital he was 177 lbs. His weight had stayed low for a long time after leaving the hospital but two weeks ago started to go up vry rapidly. He is watching is fluid intake very closely. Watching his salt.     No blood in the urine or blood in the stool. He had a nosebleed for 30-45.    Occasional headaches, get better with tylenol.. No stroke symptoms.    He has chronic driveline drainage- this is significantly decreased. He still has some opening around the driveine which was created for the I&D. No pain. No fevers or chills.    No LVAD alarms.    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    SOCIAL HISTORY:  Social History     Socioeconomic History     Marital status:      Spouse name: Not on file     Number of children: Not on file     Years of education: Not on file     Highest education level: Not on file   Occupational History     Not on file   Social Needs     Financial resource strain: Not on file     Food insecurity     Worry: Not on file     Inability: Not on file     Transportation needs     Medical: Not on file     Non-medical: Not on file   Tobacco Use     Smoking status: Not on file   Substance and Sexual Activity     Alcohol use: Not on file     Drug use: Not on file     Sexual activity: Not on file   Lifestyle     Physical activity     Days per week: Not on file     Minutes per session: Not on file     Stress: Not on file   Relationships     Social connections     Talks on phone: Not on file     Gets together: Not on file     Attends [employer] service: Not on file     Active member of club or organization: Not on file     Attends meetings of clubs or organizations: Not on file     Relationship status: Not on file     Intimate partner violence     Fear of current or ex partner: Not on file     Emotionally abused: Not on file     Physically abused: Not on file     Forced sexual activity: Not on file   Other Topics Concern     Not on file   Social History Narrative     Not on file       CURRENT MEDICATIONS:  allopurinol (ZYLOPRIM) 100 MG tablet, 2 tablets (200 mg) by Oral or Feeding Tube route daily  amiodarone (PACERONE) 200 MG tablet, TAKE 1 TABLET BY  MOUTH ONCE DAILY  amLODIPine (NORVASC) 5 MG tablet, Take 2 tablets (10 mg) by mouth daily  aspirin (ASA) 81 MG EC tablet, Take 1 tablet (81 mg) by mouth daily  B Complex-C-Folic Acid (RENAL) 1 MG CAPS, Take 1 capsule by mouth daily  ciprofloxacin (CIPRO) 750 MG tablet, Take 1 tablet (750 mg) by mouth 2 times daily  co-enzyme Q-10 200 MG CAPS, 200 mg by Oral or Feeding Tube route daily  dapagliflozin (FARXIGA) 5 MG TABS tablet, Take 5 mg by mouth daily  DAPTOmycin (CUBICIN) 500 MG injection, Inject 500 mg into the vein daily  ferrous sulfate (FEROSUL) 325 (65 Fe) MG tablet, Take 325 mg by mouth daily (with breakfast)  finasteride (PROSCAR) 5 MG tablet, Take 1 tablet (5 mg) by mouth daily Helps with urinary retention.  FLUoxetine (PROZAC) 10 MG capsule, Take 30 mg by mouth daily  hydrALAZINE (APRESOLINE) 100 MG tablet, Take 1 tablet (100 mg) by mouth 3 times daily  isosorbide mononitrate (IMDUR) 120 MG 24 HR ER tablet, Take 1 tablet (120 mg) by mouth daily for 90 days  levothyroxine (SYNTHROID/LEVOTHROID) 88 MCG tablet, Take 1 tablet (88 mcg) by mouth every morning (before breakfast)  magnesium oxide (MAG-OX) 400 MG tablet, 1 tablet (400 mg) by Oral or Feeding Tube route 2 times daily  omeprazole (PRILOSEC) 20 MG DR capsule, Take 20 mg by mouth daily  PARoxetine (PAXIL) 10 MG tablet, Take 20 mg by mouth daily  potassium chloride ER (KLOR-CON M) 20 MEQ CR tablet, Take 3 tablets (60 mEq) by mouth 2 times daily for 90 days  senna-docusate (SENOKOT-S/PERICOLACE) 8.6-50 MG tablet, Take 1 tablet by mouth 2 times daily as needed for constipation  spironolactone (ALDACTONE) 25 MG tablet, Take 1 tablet (25 mg) by mouth daily for 90 days  tamsulosin (FLOMAX) 0.4 MG capsule, Take 0.8 mg by mouth every evening This med helps with urinary retention  torsemide (DEMADEX) 100 MG tablet, Take 1 tablet (100 mg) by mouth 3 times daily for 90 days  warfarin ANTICOAGULANT (COUMADIN) 2 MG tablet, Take 4 mg by mouth daily  zolpidem (AMBIEN)  "5 MG tablet, Take 5 mg by mouth nightly as needed for Insomnia  hydrochlorothiazide (HYDRODIURIL) 25 MG tablet, Take as instruction by the VAD clinic (Patient not taking: Reported on 3/23/2023)    No current facility-administered medications on file prior to visit.      ROS:   See HPI    EXAM:  BP (!) 86/0 (BP Location: Right arm, Patient Position: Sitting, Cuff Size: Adult Regular)   Pulse 72   Ht 1.675 m (5' 5.95\")   Wt 91.6 kg (201 lb 14.4 oz)   SpO2 97%   BMI 32.64 kg/m      GENERAL: Appears comfortable, in no acute distress.   HEENT: Eye symmetrical, no discharge or icterus bilaterally. Mucous membranes moist and without lesions.  CV: Hum of HM3, occasional S1S2. JVP >15.   RESPIRATORY: Respirations regular, even, and unlabored. Lungs CTA throughout.   GI: Soft, non distended with normoactive bowel sounds. No tenderness, rebound, guarding.   EXTREMITIES: 3+ b/l peripheral pitting lower extremity edema. All extremities are warm and well perfused  NEUROLOGIC: Alert and interacting appropriately. No focal deficits.   MUSCULOSKELETAL: No joint swelling or tenderness.   SKIN: No jaundice. No rashes or lesions. Driveline dressing c/d/i.      Labs - as reviewed in clinic with patient today:  CBC RESULTS:  Lab Results   Component Value Date    WBC 10.1 03/23/2023    WBC 7.9 03/19/2021    RBC 4.41 03/23/2023    RBC 3.88 03/19/2021    HGB 12.1 (L) 03/23/2023    HGB 10.1 (A) 03/19/2021    HCT 37.9 (L) 03/23/2023    HCT 32.2 03/19/2021    MCV 86 03/23/2023    MCV 83.0 03/19/2021    MCH 27.4 03/23/2023    MCH 26.0 03/19/2021    MCHC 31.9 03/23/2023    MCHC 31.4 03/19/2021    RDW 21.9 (H) 03/23/2023    RDW 22.8 03/19/2021     03/23/2023     03/19/2021     03/17/2021       CMP RESULTS:  Lab Results   Component Value Date     (L) 03/23/2023     04/09/2021    POTASSIUM 4.3 03/23/2023    POTASSIUM 4.2 05/03/2022    POTASSIUM 4.2 04/09/2021    CHLORIDE 97 (L) 03/23/2023    CHLORIDE 97 (L) " 03/13/2023    CHLORIDE 103 04/09/2021    CO2 27 03/23/2023    CO2 26 05/03/2022    CO2 28 04/09/2021    ANIONGAP 11 03/23/2023    ANIONGAP 11 05/03/2022    ANIONGAP 8 04/09/2021     (H) 03/23/2023    GLC 96 02/09/2023     (H) 05/03/2022     (A) 04/09/2021    BUN 44.0 (H) 03/23/2023    BUN 46 (H) 05/03/2022    BUN 35 04/09/2021    CR 1.42 (H) 03/23/2023    CR 1.6 04/09/2021    GFRESTIMATED 53 (L) 03/23/2023    GFRESTIMATED 34 03/19/2021    GFRESTBLACK 39 (L) 03/17/2021    CALOS 8.6 (L) 03/23/2023    CALOS 9.0 04/09/2021    BILITOTAL 0.6 03/23/2023    BILITOTAL 0.5 03/19/2021    ALBUMIN 3.2 (L) 03/23/2023    ALBUMIN 3.7 05/03/2022    ALBUMIN 4.4 03/19/2021    ALKPHOS 307 (H) 03/23/2023    ALKPHOS 138.0 03/19/2021    ALT 92 (H) 03/23/2023    ALT 35 03/19/2021     (H) 03/23/2023    AST 60.0 03/19/2021        INR RESULTS:  Lab Results   Component Value Date    INR 2.12 (H) 03/23/2023    INR 3.0 (A) 03/13/2023    INR 1.9 (H) 02/20/2023    INR 1.8 01/12/2023    INR 3.1 (A) 07/09/2021       Lab Results   Component Value Date    MAG 2.3 03/02/2023    MAG 1.5 (L) 03/17/2021     Lab Results   Component Value Date    NTBNPI 3,352 (H) 02/07/2023    NTBNPI 27,781 (H) 02/15/2021     Lab Results   Component Value Date    NTBNP 4,416 (H) 02/10/2022       Diagnostics    3/23/23 ICD check     Device: Medtronic [id number] Visia AF MRI VR  Normal device function  Mode: VVIR  bpm  : 81%  Intrinsic rhythm: VS 62 bpm  Thoracic Impedance: Near reference line.   Short V-V intervals: 0  Lead Trends Appear Stable: yes  Estimated battery longevity to RRT = 8.7 years.(7.6 years min-9.8 years max)/Battery voltage = 3.00 V.   Atrial Arrhythmia: None  AF Pierre Part: 0  Anticoagulant: Warfarin  Ventricular Arrhythmia: None  Setting Changes: None  Patient has an appointment to see Mervat Decker PA-C today.   Plan: Remote transmissions occur automatically every 3 months with the next transmission scheduled for 6/23/23 and  RTC in 6 months.  TERESA Mclean RN     Single lead ICD    2/13/23 ECHO  Interpretation Summary  HM3 at 6000RPM.  LVAD cannula was seen in the expected anatomic position in the LV apex.  The LV is normal in size LVIDd 38 mm.  Aortic valve remains closed during the cardiac cycle, mild aortic  regurgitation.  Normal inflow and outflow velocities.  Mild tricuspid insufficiency is present.  Global right ventricular function is severely reduced.  No pericardial effusion is present.  ______________________________________________________________________________      12/12/22 RHC  RA: --/--/10  RV 42/9  PA 40/13/22  PCWP --/--/13  CO/CI: 3.24/1.66 by TD; 4.4/2.25 by Jose  PVR 2.04 (JOSE) camara    Ao sat 93%  PA sat 62%  HR 60   Right sided filling pressures are mildly elevated. Left sided filling pressures are normal. Mild elevated pulmonary hypertension. Normal cardiac output level. Hemodynamic data has been modified in Epic per physician review.    1/7/2021 RHC   Systolic (mmHg) Diastolic (mmHg) Mean (mmHg) A Wave (mmHg) V Wave (mmHg) EDP (mmHg) Max dp/dt (mmHg/sec) HR (bpm) Content (mL/dL) SAT (%)    RA Pressures  1:03 PM   5    4    9      84        RV Pressures  1:03 PM 24        5     84        PA Pressures  1:04 PM 24    7    13        88        PCW Pressures  1:04 PM   3        55          1/5/2021 ECHO    Interpretation Summary  Heartmate III LVAD, speed 5500 RPM     Left ventricular size is normal. Severely (EF 10-15%) reduced left ventricular  function is present.     LVAD cannula is not well seen in the LV apex. The outflow graft is well  visualized and Doppler is normal .     Mild to moderate right ventricular dilation is present. Global right  ventricular function is mildly to moderately reduced.     Aortic valve opens with every heart beat.     The inferior vena cava was normal in size with preserved respiratory  variability.     No pericardial effusion is present.      Assessment and Plan:   Eliseo Tanner is  a 67 year old male with chronic systolic heart failure secondary to NICM now s/p HM 3 on 2/18, moderate CAD, HTN, ABHINAV on CPAP, DM2, CKD Stage III, ANA. His HM3 post-op course was complicated by retrosternal hematoma and bleeding in the lungs, RV failure,VT in ICU now on amiodarone and Afib w/AVR S/p DCCV on 2/28. He had pre-op proteus and enterococcus bacteremia and he has had MSSA bacteremia as well, s/p abx; later on had LDH elevation 2/2 to legionella c/b renal failure requiring temporary dialysis (off iHD since 3/10/2021).  He presents today for LVAD follow-up.    We have had significant challenges managing his volume of the last few years. He was doing well for a time after his speed increase, but this year has had a driveline infection requiring I&D and is now on IV abx and he has also had a recent admission for hypervolemia.    Today, he presents 15-20 lbs up from his last discharge. He has severe pitting edema in his legs. He is already on a very high dose of torsemide. We discussed doing daily bumex infusions with is IV daptomycin, but there is a high chance that once a day IV won't be enough to reverse his hypervolemia, so he has opted for hospitalization for diuresis. On discharge, I would consider weekly IV bumex infusion to help maintain his absorption.       Chronic SCHF secondary to NICM s/p HM III with RV dysfunction. Implanted 2/18 and complicated by bleeding.   Stage D, NYHA Class IIIA  ACEi/ARB Hydralazine 100 mg TID. Continue amlodipine to 10 mg daily  BB Has been deferred given RHF, deferred d/t bradycardia now  Aldosterone antagonist deferred while other medical therapy is prioritized  SCD prophylaxis ICD  Fluid status Hypervolemic: Management per the ER and cards 2  MAP: Goal 65-85, he is-86 today  LDH: 269, stable  Anticoagulation: Coumadin per pharmacy.  Goal 1.8-2.5 for recurrent nosebleeding, INR 2.12- defer bridge, dosing per ACC team  Antiplatelet: ASA 81 mg po daily  Cardiomems goal:  Yes, Pad goal is 21-24, within goal today despite juan hypervolemia, so may need to be adjusted    VAD Interrogation on March 23, 2023. VAD interrogation reviewed with VAD coordinator. Agree with findings. Frequent PI events. No power spikes, speed drops, or other findings suspicious of pump malfunction noted.    CKD stage III  Baseline has increased over the last year. Current B/l is around 1.8-2.2.   - Improved today to 1.42  - Diuretic management as above    MSSA Driveline infection, ongoing drainage, but improving  - Following with ID  - On daily daptomycin (gets at his home hospital)   - On cipro 750 mg BID      Prolonged QTC, has multiple QTC prolonging medications. EKG obtained today, qtc is by read 584, but note that he is v-pace.  - Trend inpatient and adjust as needed    A. Fib.  Sinus Bradycardia  History of Afib w/ RVR and aberrancy vs. NATHALIA vs. AT vs. Slow VT. S/p DCCV on 2/28 back into sinus rhythm. Has been tachycardic in the past but now in sinus bradycardia with increased RV pacing.  - Continue on amiodarone    HTN, near goal today  - Continue hydralazine and amlodipine    History of HIT  - Avoid heparin products     Abnormal LFTs  - Has had elevated LFTS for some time, likely RV failure but need to consider medication effect as well, especially in the setting of amiodarone  - further work-up per inpatient team    Follow up   - Per inpatient team  - On discharge, consider weekly IV bumex infusions    Billing  - I reviewed 3+ tests  - I managed 2+ stable chronic problems and one with severe progression prossing a risk of harm  - I made a decision regarding hospitalization.    BIJAL Padron TAMAS

## 2023-03-24 NOTE — PHARMACY-ADMISSION MEDICATION HISTORY
"  Admission Medication History Completed by Pharmacy    See [facility] Admission Navigator for allergy information, preferred outpatient pharmacy, prior to admission medications and immunization status.     Medication History Sources:     EMR review ([facility], [facility] notes, anticoagulation encounters)    Outpatient pharmacy ([facility] in [place], [place])    Last doses per patient and infusion clinic    Changes made to PTA medication list (reason):    Added: None    Deleted: fluoxetine (stopped 3/4/23 and transitioned to paroxetine 3/7/23)    Changed: mag ox and allopurinol changed from route of \"oral or feeding tube\" to just \"oral\"    Additional Information:    Ciprofloxacin started on 3/10/23 per ID for pseudomonas coverage with no planned stop date at this time    Warfarin indicated for LVAD with INR goal of 1.7-2.3. Most recent dosing is 4 mg daily but did receive reduced doses of 2 mg on 3/13 and 3/14    Levothyroxine dose increased from 75 mcg to 88 mcg recently on 2/1/23    Medication Sig Last Dose Taking? Auth Provider   allopurinol (ZYLOPRIM) 100 MG tablet 2 tablets (200 mg) by Oral or Feeding Tube route daily  Patient taking differently: Take 200 mg by mouth daily 3/23/2023 Yes Mono Gambino PA-C   amiodarone (PACERONE) 200 MG tablet TAKE 1 TABLET BY MOUTH ONCE DAILY 3/23/2023 Yes Mervat Decker PA-C   amLODIPine (NORVASC) 5 MG tablet Take 2 tablets (10 mg) by mouth daily 3/23/2023 Yes Mervat Decker PA-C   aspirin (ASA) 81 MG EC tablet Take 1 tablet (81 mg) by mouth daily 3/23/2023 Yes Nader Cisneros MD   B Complex-C-Folic Acid (RENAL) 1 MG CAPS Take 1 capsule by mouth daily 3/23/2023 Yes Nader Cisneros MD   ciprofloxacin (CIPRO) 750 MG tablet Take 1 tablet (750 mg) by mouth 2 times daily 3/23/2023 Yes Christian Philip MD   co-enzyme Q-10 200 MG CAPS Take 200 mg by mouth daily 3/23/2023 Yes Mono Gambino PA-C   dapagliflozin (FARXIGA) 5 MG TABS tablet Take 5 mg by mouth " daily 3/23/2023 Yes Unknown, Entered By History   DAPTOmycin (CUBICIN) 500 MG injection Inject 500 mg into the vein daily 3/22/2023 Yes Unknown, Entered By History   ferrous sulfate (FEROSUL) 325 (65 Fe) MG tablet Take 325 mg by mouth daily (with breakfast) 3/23/2023 Yes Reported, Patient   finasteride (PROSCAR) 5 MG tablet Take 1 tablet (5 mg) by mouth daily Helps with urinary retention. 3/23/2023 Yes Jess Meraz MD   hydrALAZINE (APRESOLINE) 100 MG tablet Take 1 tablet (100 mg) by mouth 3 times daily 3/23/2023 Yes Nader Cisneros MD   hydrochlorothiazide (HYDRODIURIL) 25 MG tablet Take as instruction by the [facility] clinic Past Month at PRN Yes Alexy Gomez MD   isosorbide mononitrate (IMDUR) 120 MG 24 HR ER tablet Take 1 tablet (120 mg) by mouth daily for 90 days 3/23/2023 Yes Alexy Gomez MD   levothyroxine (SYNTHROID/LEVOTHROID) 88 MCG tablet Take 1 tablet (88 mcg) by mouth every morning (before breakfast) 3/23/2023 Yes Alexy Gomez MD   magnesium oxide (MAG-OX) 400 MG tablet Take 400 mg by mouth 2 times daily 3/23/2023 Yes Laine Leon APRN CNP   omeprazole (PRILOSEC) 20 MG DR capsule Take 20 mg by mouth daily 3/23/2023 Yes Unknown, Entered By History   PARoxetine (PAXIL) 10 MG tablet Take 20 mg by mouth daily 3/23/2023 Yes Reported, Patient   potassium chloride ER (KLOR-CON M) 20 MEQ CR tablet Take 3 tablets (60 mEq) by mouth 2 times daily for 90 days 3/23/2023 Yes Alexy Gomez MD   senna-docusate (SENOKOT-S/PERICOLACE) 8.6-50 MG tablet Take 1 tablet by mouth 2 times daily as needed for constipation  Yes Unknown, Entered By History   spironolactone (ALDACTONE) 25 MG tablet Take 1 tablet (25 mg) by mouth daily for 90 days 3/23/2023 Yes Alexy Gomez MD   tamsulosin (FLOMAX) 0.4 MG capsule Take 0.8 mg by mouth every evening This med helps with urinary retention 3/22/2023 Yes Nader Cisneros MD   torsemide (DEMADEX) 100 MG tablet Take 1 tablet (100 mg) by mouth 3 times daily for 90 days  3/23/2023 Yes Alexy Gomez MD   zolpidem (AMBIEN) 5 MG tablet Take 5 mg by mouth nightly as needed for Insomnia  Yes Reported, Patient   warfarin ANTICOAGULANT (COUMADIN) 2 MG tablet Take 4 mg by mouth daily 3/22/2023 at PM  Laine Leon APRN CNP       Date completed: 03/24/23    Medication history completed by: Sarah Kim, PharmD, BCCP, BCPS

## 2023-03-24 NOTE — PLAN OF CARE
D/I/A: Pt here with fluid overload.  PMH HM 3 on 2/18, moderate CAD, HTN, ABHINAV on CPAP, DM2, CKD Stage III, HIT.  Bumex infusing at 2mg. Abd US and CT completed today-unremarkable.  Electrolytes replaced per protocol.  WOC nurse here to evaluate driveline site and ordered dsg changes.  Driveline culture sent.  Great appetite.  Voiding.  +BM.  Endorses some generalized cramping this evening from diuresing-page out for muscle relaxant.      P:  Continue to monitor and update team with changes/concerns.  Anticipate being here through the weekend to diurese.

## 2023-03-24 NOTE — PROGRESS NOTES
Transfer  Transferred from: ED   Via: bed  Reason for transfer: Pt appropriate for 6C  Family: Aware of transfer  Belongings: Sent with pt  Chart: Sent with pt  Medications: Meds from bin sent with pt  Report called from: KRISTEN Phillip

## 2023-03-24 NOTE — PROGRESS NOTES
D: Stopped by to see patient. No VAD related questions or concerns at this time.   I: Discussed POC and provided support and listened to patient's thoughts and concerns.  P: Continue to follow patient and address any questions or concerns patient and or caregiver may have.      Indication of Interrogation:  Heart failure, eval hemodynamic fxn, flows/PI    Type of VAD:  Heartmate 3    Current Parameters:  Flow= 5.2 lpm, Speed= 5800 rpm, Power= 4.7 muhamamd, PI= 3.3    Abnormal Alarm on History:  No    Abnormal Events/Parameters Notes:  No (patient had controller changed yesterday, thus history was short)    Changes Made during Interrogation:  No

## 2023-03-24 NOTE — CONSULTS
Care Management Initial Consult    General Information  Assessment completed with: Patient,    Type of CM/SW Visit: Initial Assessment    Primary Care Provider verified and updated as needed: Yes   Readmission within the last 30 days:        Reason for Consult: discharge planning  Advance Care Planning:            Communication Assessment  Patient's communication style: spoken language (English or Bilingual)    Hearing Difficulty or Deaf: yes   Wear Glasses or Blind: yes    Cognitive  Cognitive/Neuro/Behavioral: WDL                      Living Environment:   People in home: grandchild(arnold), spouse     Current living Arrangements: house      Able to return to prior arrangements: yes       Family/Social Support:  Care provided by: self, spouse/significant other  Provides care for: other (see comments) (granddaughter)  Marital Status:   Wife  Chapis       Description of Support System: Supportive, Involved    Support Assessment: Adequate family and caregiver support, Adequate social supports    Current Resources:   Patient receiving home care services: No     Community Resources: Infusion Services ([facility])  Equipment currently used at home: shower chair, grab bar, toilet, grab bar, tub/shower, raised toilet seat  Supplies currently used at home:      Employment/Financial:  Employment Status: retired        Financial Concerns: No concerns identified           Lifestyle & Psychosocial Needs:  Social Determinants of Health     Tobacco Use: Medium Risk     Smoking Tobacco Use: Former     Smokeless Tobacco Use: Never     Passive Exposure: Not on file   Alcohol Use: Not on file   Financial Resource Strain: Not on file   Food Insecurity: Not on file   Transportation Needs: Not on file   Physical Activity: Not on file   Stress: Not on file   Social Connections: Not on file   Intimate Partner Violence: Not on file   Depression: Not at risk     PHQ-2 Score: 0   Housing Stability: Not on file        Functional Status:  Prior to admission patient needed assistance:   Dependent ADLs:: Independent, Ambulation-no assistive device  Dependent IADLs:: Independent       Mental Health Status:  Mental Health Status: No Current Concerns       Chemical Dependency Status:  Chemical Dependency Status: No Current Concerns             Values/Beliefs:  Spiritual, Cultural Beliefs, Hoahaoism Practices, Values that affect care:                 Additional Information:  Pt known to writer from LVAD program. Pt had LVAD implanted 2/18/2020. Pt admitted 3/23 for acute on chronic heart failure exacerbation. 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 Discharged with daily IV abx and wound f/u at his local clinic. Pt continues with daily IV abx at his local clinic. Reports this has been going well. Pt does not have coverage for home IV abx and this is why he has to do outpatient infusion.     Pt lives with his wife and granddaughter in [facility]. Pt has great support from his wife. Pt is independent w/ ADLs, IADLs, ambulation and was driving to daily clinic appts. Pt anticipates returning home at discharge, which is not anticipated until next week.        Cintia Perkins, VERÓNICASW

## 2023-03-24 NOTE — PROGRESS NOTES
Care Coordiantor  D/I: pt here for volume overload has LVAD. Warfarin goal 1.7-2.3  [facility] coumadin clinic follows(Nader Cisneros MD) and per Dr German Reyes Veldez may have cholecytitis.    P: Pt is known to this writer and on 2/14 we discharged him to home and he goes to:  [facility] [place]   Phone: [phone]   Fax: [phone]   For: IV daptomycin and  wound care orders  Pt's wife works at the hospital and drives him    Need to ask on Monday, 3/27 IF MD's will add IV Lasix to his orders to receive outpatient @ [facility]_____.    Will be here until next week.

## 2023-03-24 NOTE — PLAN OF CARE
Temp: 98.5  F (36.9  C) Temp src: Oral BP: (!) 107/90 Pulse: 64   Resp: 16 SpO2: 97 % O2 Device: None (Room air)       D: Admitted for fluid overload. Hx HFrEF secondary to NICM s/p HM3 LVAD c/b, recurrent driveline infections; HTN, hypothyroid      I/A: Eliseo is A&O x4. Tele in place, paced. VSS on RA. VAD numbers WDL. Midline in place infusing Bumex at 2mg/hr. Potassium replaced this shift, recheck ordered for 0730. Voiding well. Tylenol given x1 for BLE cramping. Up independently. Appeared to sleep between cares overnight     P: Continue to monitor and follow POC. Notify Cards 2 with changes      Goal Outcome Evaluation:    Plan of Care Reviewed With: patient  Overall Patient Progress: improving

## 2023-03-24 NOTE — PROGRESS NOTES
[facility]    Cardiology Progress Note- Cardiology        Date of Admission:  3/23/2023     Assessment & Plan: S   Eliseo Tanner is a 67 year old male with chronic systolic heart failure secondary to NICM now s/p HM 3 on 2/18, moderate CAD, HTN, ABHINAV on CPAP, DM2, CKD Stage III, ANA. His HM3 post-op course was complicated by retrosternal hematoma and bleeding in the lungs, RV failure,VT in ICU now on amiodarone and Afib w/AVR S/p DCCV on 2/28. He had pre-op proteus and enterococcus bacteremia and he has had MSSA bacteremia as well, s/p abx; later on had LDH elevation 2/2 to legionella c/b renal failure requiring temporary dialysis (off iHD since 3/10/2021). He was admitted for volume overload management.    Today:  -Continue diuresis      Chronic SCHF secondary to NICM s/p HM III with RV dysfunction. Implanted 2/18/20 and complicated by bleeding.   Presented in the morning to clinic for follow up of LVAD and volume overload. He has been struggling to maintain fluid off with 100mg torsemide TID. He has noticed worsening of edema for the past 2 weeks and increased in weight, although not specific. No LVAD alarms but frequent PI events on interrogation 3/23/23. We will continue to manage his volume status for optimization.      Stage D, NYHA Class IIIA  -ACEi/ARB Hydralazine 100 mg TID. Continue amlodipine to 10 mg daily  -BB Has been deferred given RHF, deferred d/t bradycardia now  -Aldosterone antagonist deferred while other medical therapy is prioritized  -SCD prophylaxis CRT-D - interrogation ordered.   Fluid status Hypervolemic: 100mg torsemide TID is PTA regimen. Took am dose, receive 60mg furosemide and 7mg bumex with appropriate response. -Give 5mg bumex followed by 2mg/hr gtt               -Strict I/Os              -1800ml FR              -Daily Weights               -BID BMP + Mg               K>4, Mg>2  MAP: Goal 65-85, 85 in the ED  LDH: 269,  stable  Anticoagulation: Goal 1.7-2.3 for recurrent nose-bleeding.   Antiplatelet: ASA 81 mg po daily  CardioMems: PAD goal is 21-24.     Heartmate 3 LEFT VS  Flow (Lpm): 5.1 Lpm  Pulse Index (PI): 3.3 PI  Speed (rpm): 5800 rpm  Power (muhammad): 4.7 muhammad  Current Hct settin (35.3 on labs)    Abnormal LFTs  Abdominal tenderness   Has had elevated LFTS for some time, likely RV failure but need to consider medication effect as well, especially in the setting of amiodarone. Ascites likely present. 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 GGT here is impressively elevated. RUQ US and CT Chest/Abd/Pelvis unimpressive.   -CTM    CKD stage III  Baseline has increased over the last year. Current B/l is around 1.8-2.2.   - Improved with diuresis.   - Diuretic management as above     MSSA Driveline infection, ongoing drainage, but improving  Following with ID. No acute concerns. Wounds unimpressive today.   - Continue PTA daily daptomycin (gets at his home hospital)   - Continue cipro 750 mg BID       Prolonged QTC, has multiple QTC prolonging medications. EKG obtained today, qtc is by read 584, then 643, but note that he is v-pace.  - Trend inpatient and adjust as needed     A. Fib.  Sinus Bradycardia  History of Afib w/ RVR and aberrancy vs. NATHALIA vs. AT vs. Slow VT. S/p DCCV on  back into sinus rhythm. Has been tachycardic in the past but now in sinus bradycardia with increased RV pacing.  - Continue on amiodarone     HTN  Near goal today  - Continue hydralazine and amlodipine     History of HIT  - Avoid heparin products      Diet: Fluid restriction 1800 ML FLUID  Combination Diet Low Saturated Fat Na <2400mg Diet, No Caffeine Diet    DVT Prophylaxis: Warfarin  Guevara Catheter: Not present  Cardiac Monitoring: ACTIVE order. Indication: Acute decompensated heart failure (48 hours)  Code Status: Full Code        Clinically Significant Risk Factors Present on Admission  "       # Hypokalemia: Lowest K = 3.2 mmol/L in last 2 days, will replace as needed       # Hypoalbuminemia: Lowest albumin = 2.8 g/dL at 3/24/2023  7:49 AM, will monitor as appropriate  # Drug Induced Coagulation Defect: home medication list includes an anticoagulant medication    # Hypertension: home medication list includes antihypertensive(s)  # End stage heart failure: Ventricular assist device (VAD) present     # Obesity: Estimated body mass index is 30.13 kg/m  as calculated from the following:    Height as of this encounter: 1.702 m (5' 7\").    Weight as of this encounter: 87.3 kg (192 lb 6.4 oz).           Disposition Plan   Expected discharge: 2 - 3 days, recommended to prior living arrangement once fluid volume status optimized on oral medication.    Entered: German Velez Reyes, MD 03/24/2023, 10:20 AM   The patient's care was discussed with the Attending Physician, Dr. Dutton..      German Velez Reyes, MD, PhD  Internal Medicine PGY1  [facility]  ______________________________________________________________________    Interval History   Care team notes reviewed. Continues with abdominal tenderness. No other concerns. Improving from a volume perspective.     Physical Exam   Vital Signs: Temp: 98.6  F (37  C) Temp src: Oral BP: 96/75 Pulse: 59   Resp: 16 SpO2: 95 % O2 Device: None (Room air)    Weight: 192 lbs 6.4 oz    GENERAL: Appears comfortable, in no acute distress. Resting in bed.   HEENT: Eye symmetrical, no discharge or icterus bilaterally. Mucous membranes moist and without lesions.  CV: Hum of HM3, occasional S1S2. JVD to earlobe.   RESPIRATORY: Respirations regular, even, and unlabored. Lungs CTA throughout.   GI: Soft,  distended with normoactive bowel sounds. No tenderness, rebound, guarding.   EXTREMITIES: 3+ b/l peripheral pitting lower extremity edema. All extremities are warm and well perfused  NEUROLOGIC: Alert and interacting appropriately. " No focal deficits.   MUSCULOSKELETAL: No joint swelling or tenderness.   SKIN: No jaundice. No rashes or lesions. Driveline dressing c/d/i.       Medical Decision Making       Please see A&P for additional details of medical decision making.      Data   ------------------------- PAST 24 HR DATA REVIEWED -----------------------------------------------    I have personally reviewed the following data over the past 24 hrs:    8.4  \   11.1 (L)   / 279     135 (L) 98 40.4 (H) /  103 (H)   3.4; 3.4 24 1.38 (H) \       ALT: 85 (H) AST: 141 (H) AP: 216 (H) TBILI: 0.6   ALB: 2.8 (L) TOT PROTEIN: 7.0 LIPASE: N/A       Trop: N/A BNP: 1,452 (H)       INR:  2.40 (H) PTT:  43 (H)   D-dimer:  N/A Fibrinogen:  N/A       Imaging results reviewed over the past 24 hrs:   Recent Results (from the past 24 hour(s))   XR Chest Port 1 View    Narrative    EXAM: XR CHEST PORT 1 VIEW  3/23/2023 5:26 PM    HISTORY: 67 years Male Volume Overload.     COMPARISON: Chest radiograph 9/2/2022.    TECHNIQUE: 50 degree portable AP view of the chest.    FINDINGS:   Postsurgical changes of the chest. Median sternotomy wires appear  intact. LVAD. Left chest wall implantable cardiac defibrillator  present with single lead terminating over the right ventricle. Lead  appears intact. Partially visualized left midline intravenous catheter  terminating over the left axilla.    Midline trachea. Enlarged cardiomediastinal silhouette. Blunted right  costophrenic angle. No discernible pneumothorax. Mildly reduced lung  volumes. Perihilar interstitial opacities, similar in appearance to  prior chest x-ray from 9/2/22. No acute or suspicious osseous  abnormalities.       Impression    IMPRESSION:   1.  Perihilar interstitial opacities, similar in appearance to prior  chest x-ray from 9/2/22.  2.  Small right pleural effusion.  3.  Cardiomegaly.    I have personally reviewed the examination and initial interpretation  and I agree with the findings.    QUIQUE  MD MAGDALENA         SYSTEM ID:  [id number]

## 2023-03-24 NOTE — CONSULTS
[facility] Nurse Inpatient Assessment     Consulted for: LVAD site     Patient History (according to provider note(s):      Eliseo Tanner is a 67 year old male comes in today after being seen in LVAD clinic today for volume overload management. He says that starting about two weeks ago he noticed worsening bilateral leg and abdominal edema with eight increase. He has been taking 100mg torsemide TID with no improvement of edema. He denies SOB at rest or with activity. He is able to sleep flat, no PND/Orthopnea, no chest pain. Denies LVAD alarms.      He has noted that his LVAD driveline infection has been improving and denies any changes.     Areas Assessed:      Areas visualized during today's visit: Abdomen    Wound location: abdomen, around LVAD driveline       Last photo: 3/24  Wound due to: Surgical Wound  Wound history/plan of care: chronic LVAD driveline infection currently on Abx, s/p I&D a few months ago  Wound base: 100 % slough,     Palpation of the wound bed: firm      Drainage: moderate     Description of drainage: purulent, cloudy and yellow     Measurements (length x width x depth, in cm): 2  x 1.5  x  0.4 cm      Tunneling: N/A     Undermining: N/A  Periwound skin: Scar tissue      Color: pink      Temperature: normal   Odor: none  Pain: denies , none  Pain interventions prior to dressing change: slow and gentle cares   Treatment goal: Infection control/prevention  STATUS: initial assessment  Supplies ordered: gathered, at bedside and ordered hydrofera blue       Treatment Plan:     LVAD driveline wound(s): Daily  remove soiled dressing and discard, apply Vashe ([id number]) soaked gauze over wound for a few minutes and remove, then use vashe moistened gauze to clean up any drainage around the tube as able, don sterile gloves per protocol, cut to fit hydrofera blue foam ([id number]) moisten with saline and ring out excess then apply over wound bed/around tube.  Cover with two 4x4 mepilex. Keep tail end of tube secured with anchor device per protocol.      Orders: Written    RECOMMEND PRIMARY TEAM ORDER: None, at this time  Education provided: plan of care  Discussed plan of care with: Patient and Nurse  [facility] nurse follow-up plan: weekly  Notify [facility] if wound(s) deteriorate.  Nursing to notify the Provider(s) and re-consult the [facility] Nurse if new skin concern.    DATA:     Current support surface: Standard  Standard gel/foam mattress (IsoFlex, Atmos air, etc)  Containment of urine/stool: Continent of bladder and Continent of bowel  BMI: Body mass index is 30.13 kg/m .   Active diet order: Orders Placed This Encounter      Low Saturated Fat Na <2400 mg     Output: I/O last 3 completed shifts:  In: 762.4 [P.O.:700; I.V.:62.4]  Out: 5150 [Urine:5150]     Labs: Recent Labs   Lab 03/24/23  0749   ALBUMIN 2.8*   HGB 11.1*   INR 2.40*   WBC 8.4     Pressure injury risk assessment:   Sensory Perception: 4-->no impairment  Moisture: 4-->rarely moist  Activity: 3-->walks occasionally  Mobility: 4-->no limitation  Nutrition: 3-->adequate  Friction and Shear: 3-->no apparent problem  Jens Score: 21      Pager no longer is use, please contact through [facility] [employer] group: [facility] Nurse  Dept. Office Number: [phone]

## 2023-03-25 NOTE — PROGRESS NOTES
[facility]    Cardiology Progress Note- Cardiology        Date of Admission:  3/23/2023     Assessment & Plan: S   Eliseo Tanner is a 67 year old male with chronic systolic heart failure secondary to NICM now s/p HM 3 on 2/18, moderate CAD, HTN, ABHINAV on CPAP, DM2, CKD Stage III, ANA. His HM3 post-op course was complicated by retrosternal hematoma and bleeding in the lungs, RV failure,VT in ICU now on amiodarone and Afib w/AVR S/p DCCV on 2/28. He had pre-op proteus and enterococcus bacteremia and he has had MSSA bacteremia as well, s/p abx; later on had LDH elevation 2/2 to legionella c/b renal failure requiring temporary dialysis (off iHD since 3/10/2021). He was admitted for volume overload management.    Today:  -Continue diuresis with bumex gtt, once 5mg of metolazone today      Chronic SCHF secondary to NICM s/p HM III with RV dysfunction.   Implanted 2/18/20 and complicated by bleeding. Presented in the morning to clinic for follow up of LVAD and volume overload. He has been struggling to maintain fluid off with 100mg torsemide TID. He has noticed worsening of edema for the past 2 weeks and increased in weight, although not specific. No LVAD alarms but frequent PI events on interrogation 3/23/23. We will continue to manage his volume status for optimization. Will undergo possible subcutaneous furosemide as outpatient due to PO failure. Holding on changes on afterload until after volume status euvolemic.      Stage D, NYHA Class IIIA  -ACEi/ARB Hydralazine 100 mg TID. Continue amlodipine to 10 mg daily  -BB Has been deferred given RHF, deferred d/t bradycardia now  -Aldosterone antagonist deferred while other medical therapy is prioritized  -SCD prophylaxis CRT-D - interrogation ordered.   Fluid status Hypervolemic: 100mg torsemide TID is PTA regimen. Took am dose, receive 60mg furosemide and 7mg bumex with appropriate response. -Give 5mg bumex  followed by 2mg/hr gtt               -Strict I/Os              -1800ml FR              -Daily Weights               -BID BMP + Mg                K>4, Mg>2  MAP: Goal 65-85, 85 in the ED  LDH: 269, stable  Anticoagulation: Goal 1.7-2.3 for recurrent nose-bleeding.   Antiplatelet: ASA 81 mg po daily  CardioMems: PAD goal is 21-24.     Heartmate 3 LEFT VS  Flow (Lpm): 5.1 Lpm  Pulse Index (PI): 3.1 PI  Speed (rpm): 5800 rpm  Power (muhammad): 4.9 muhammad  Current Hct settin    Abnormal LFTs  Abdominal tenderness   Has had elevated LFTS for some time, likely RV failure but need to consider medication effect as well, especially in the setting of amiodarone. Ascites likely present. 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 GGT here is impressively elevated. RUQ US and CT Chest/Abd/Pelvis unimpressive.   -CTM    CKD stage III  Baseline has increased over the last year. Current B/l is around 1.8-2.2.   - Improved with diuresis.   - Diuretic management as above     MSSA Driveline infection, ongoing drainage, but improving  Following with ID. No acute concerns. Wounds unimpressive today.   - Continue PTA daily daptomycin (gets at his home hospital)   - Continue cipro 750 mg BID       Prolonged QTC, has multiple QTC prolonging medications. EKG obtained today, qtc is by read 584, then 643, but note that he is v-pace.  - Trend inpatient and adjust as needed     A. Fib.  Sinus Bradycardia  History of Afib w/ RVR and aberrancy vs. NATHALIA vs. AT vs. Slow VT. S/p DCCV on  back into sinus rhythm. Has been tachycardic in the past but now in sinus bradycardia with increased RV pacing.  - Continue on amiodarone     HTN  Near goal today  - Continue hydralazine and amlodipine     History of HIT  - Avoid heparin products      Diet: Fluid restriction 1800 ML FLUID  Combination Diet Low Saturated Fat Na <2400mg Diet, No Caffeine Diet    DVT Prophylaxis: Warfarin  Guevara Catheter: Not  "present  Cardiac Monitoring: ACTIVE order. Indication: Acute decompensated heart failure (48 hours)  Code Status: Full Code        Clinically Significant Risk Factors        # Hypokalemia: Lowest K = 3.2 mmol/L in last 2 days, will replace as needed       # Hypoalbuminemia: Lowest albumin = 2.8 g/dL at 3/24/2023  7:49 AM, will monitor as appropriate            # Overweight: Estimated body mass index is 29.51 kg/m  as calculated from the following:    Height as of this encounter: 1.702 m (5' 7\").    Weight as of this encounter: 85.5 kg (188 lb 6.4 oz)., PRESENT ON ADMISSION         Disposition Plan   Expected discharge: 2 - 3 days, recommended to prior living arrangement once fluid volume status optimized on oral medication.    Entered: German Velez Reyes, MD 03/25/2023, 9:54 AM   The patient's care was discussed with the Attending Physician, Dr. Dutton..      German Velez Reyes, MD, PhD  Internal Medicine PGY1  [facility]  ______________________________________________________________________    Interval History   Care team notes reviewed. Abdominal pain and tenderness improved today. No major complaints.     Physical Exam   Vital Signs: Temp: 97.7  F (36.5  C) Temp src: Oral BP: 91/48 Pulse: 67   Resp: 16 SpO2: 95 % O2 Device: None (Room air)    Weight: 188 lbs 6.4 oz    GENERAL: Appears comfortable, in no acute distress. Resting in bed.   HEENT: Eye symmetrical, no discharge or icterus bilaterally. Mucous membranes moist and without lesions.  CV: Hum of HM3, occasional S1S2. JVD to earlobe.   RESPIRATORY: Respirations regular, even, and unlabored. Lungs CTA throughout.   GI: Soft,  normoactive bowel sounds. No tenderness, rebound, guarding.   EXTREMITIES: 3+ b/l peripheral pitting lower extremity edema. All extremities are warm and well perfused  NEUROLOGIC: Alert and interacting appropriately. No focal deficits.   MUSCULOSKELETAL: No joint swelling or tenderness. "   SKIN: No jaundice. No rashes or lesions. Driveline dressing c/d/i.       Medical Decision Making       Please see A&P for additional details of medical decision making.      Data   ------------------------- PAST 24 HR DATA REVIEWED -----------------------------------------------    I have personally reviewed the following data over the past 24 hrs:    8.4  \   11.7 (L)   / 278     136 99 38.4 (H) /  125 (H)   4.1; 4.1 26 1.27 (H) \       ALT: N/A AST: N/A AP: N/A TBILI: N/A   ALB: N/A TOT PROTEIN: N/A LIPASE: N/A       Procal: N/A CRP: N/A Lactic Acid: N/A       INR:  2.22 (H) PTT:  N/A   D-dimer:  N/A Fibrinogen:  N/A       Imaging results reviewed over the past 24 hrs:   Recent Results (from the past 24 hour(s))   CT Chest Abdomen Pelvis w/o Contrast    Narrative    EXAMINATION: CT CHEST ABDOMEN PELVIS W/O CONTRAST, 3/24/2023 10:11 AM    TECHNIQUE:  Helical CT images from the thoracic inlet through the  symphysis pubis were obtained  without contrast.     COMPARISON: CT chest abdomen and pelvis 12/8/2022    HISTORY: 66 y/o with LVAD driveline infecton (chronic), with new  abdominal pain    FINDINGS:    Chest:   Left chest wall defibrillator with lead terminating in the right  ventricle. Stable positioning of LVAD. There is minimal surrounding  fluid along the drive line in the subcutaneous soft tissues of the  anterior right chest. No discrete organized fluid collection or  significant inflammatory change. Visualized thyroid is unremarkable.  Stable cardiomegaly. Coronary artery calcifications. The main  pulmonary artery and thoracic aorta are normal in caliber. No  axillary, mediastinal or hilar lymphadenopathy. Mildly patulous  esophagus. Small hiatal hernia.    The central tracheobronchial tree is patent. No pneumothorax. New  small right pleural effusion and adjacent atelectasis. Mild apical  predominant emphysematous changes. Left basilar atelectasis. No  definite acute airspace disease. Unchanged few sub-4  mm pulmonary  nodules.     Abdomen and pelvis:   Cirrhotic morphology of the liver. Cholelithiasis. No intra or  extrahepatic biliary ductal dilation. The spleen and adrenal glands  are unremarkable. Fatty atrophy of the pancreas. Unremarkable  noncontrast appearance of the kidneys. No hydronephrosis or  hydroureter. Right posterior bladder wall diverticula. No pelvic mass.    Circumferential gastric thickening is probably related to  underdistention. The small and large bowel are normal in caliber. Mild  colonic diverticulosis without adjacent inflammatory change. Appendix  is normal. Trace abdominal pelvic ascites, similar to prior. No  intra-abdominal free air. Prominent bilateral inguinal lymph nodes  measuring 13 mm in short axis on the right and 15 mm in short axis on  the left, increased from prior, favored reactive. No abdominal aortic  aneurysm. Aortoiliac calcific atherosclerosis.    Bones and soft tissues: Small fluid containing periumbilical hernia.  No acute or aggressive appearing osseous lesion. Degenerative changes.  Stable stepwise anterolisthesis of L3 on L4 and L4 and L5. Median  sternotomy wires.      Impression    IMPRESSION:   1. Small right pleural effusion and adjacent compressive atelectasis.  2. No acute pathology is visualized within the abdomen or pelvis. No  CT findings to explain the patient's abdominal pain.  3. LVAD with minimal surrounding fluid along the drive line in the  subcutaneous soft tissues of the anterior right chest similar to  prior. No discrete organized fluid collection or significant  inflammatory change.  4. Prominent bilateral inguinal lymph nodes have increased in size  from prior, favored reactive.  5. Additional chronic and incidental findings as detailed in the body  report are stable from prior.    I have personally reviewed the examination and initial interpretation  and I agree with the findings.    PEYMAN VERAS MD         SYSTEM ID:  [id number]

## 2023-03-25 NOTE — PLAN OF CARE
D/I/A: Pt here with fluid overload.  PMH HM 3, moderate CAD, HTN, ABHINAV on CPAP, DM2, CKD Stage III, HIT, cadioMEMs.  Pt continues on bumex drip.  Electrolytes replaced per protocol.  LVAD dsg changed.  CardioMems reading 29.  Blanchable redness noted to coccyx.  Some soreness reported from arthritis.  Ind to SBA in room.  +void, +BM, good appetite. Handouts given for jardiance-pt states has taken in the past.  One time metolazone given.  Declines walking outside room today.    P: Anticipate wean off bumex drip tomorrow and discharge on Monday.  Update team with changes/concerns.

## 2023-03-25 NOTE — PLAN OF CARE
D: Admitted with fluid overload    PMH: chronic systolic heart failure secondary to NICM now s/p HM 3 on 2/18, moderate CAD, HTN, ABHINAV on CPAP, DM2, CKD Stage III, ANA, VT, Afib w/AVR S/p DCCV, chronic driveline infections     I: Monitored vitals and assessed pt status.   Running: Bumex @ 2mg/hr  PRN: Ambien, Robaxin  Tele: V paced  O2: room air, home CPAP at night  Mobility: independent     A: A0x4. VSS. MAPs . MD aware. No new orders overnight. LVAD #s WNL. DL drsg WNL. Afebrile. Urinating adequately. LBM 3/24. C/o generalized body cramps. Relieved with Robaxin.     P: Continue to monitor Pt status and report changes to Cards 2. Continue to diurese.

## 2023-03-26 NOTE — PLAN OF CARE
D/I/A: Pt here with fluid overload.  PMH HM 3, moderate CAD, HTN, ABHINAV on CPAP, DM2, CKD Stage III, HIT, cadioMEMs.  Pt continues on bumex drip.  Electrolytes replaced per protocol.  LVAD dsg changed. CardioMems reading 25 today.  Started spironolactone today.  Episode of bloody nose-resolved.  P:  Order for bumex off at 2000.  Update team with changes/concerns.  Anticipate discharge tomorrow.

## 2023-03-26 NOTE — PROGRESS NOTES
[facility]    Cardiology Progress Note- Cardiology        Date of Admission:  3/23/2023     Assessment & Plan: S   Eliseo Tanner is a 67 year old male with chronic systolic heart failure secondary to NICM now s/p HM 3 on 2/18, moderate CAD, HTN, ABHINAV on CPAP, DM2, CKD Stage III, ANA. His HM3 post-op course was complicated by retrosternal hematoma and bleeding in the lungs, RV failure,VT in ICU now on amiodarone and Afib w/AVR S/p DCCV on 2/28. He had pre-op proteus and enterococcus bacteremia and he has had MSSA bacteremia as well, s/p abx; later on had LDH elevation 2/2 to legionella c/b renal failure requiring temporary dialysis (off iHD since 3/10/2021). He was admitted for volume overload management.    Today:  -Continue diuresis with bumex gtt, near dry weight (~175lb)     Chronic SCHF secondary to NICM s/p HM III with RV dysfunction.   Implanted 2/18/20 and complicated by bleeding. Presented in the morning to clinic for follow up of LVAD and volume overload. He has been struggling to maintain fluid off with 100mg torsemide TID. He has noticed worsening of edema for the past 2 weeks and increased in weight, although not specific. No LVAD alarms but frequent PI events on interrogation 3/23/23. We will continue to manage his volume status for optimization. Will undergo possible subcutaneous furosemide as outpatient due to PO failure. Holding on changes on afterload until after volume status euvolemic.      Stage D, NYHA Class IIIA  -ACEi/ARB Hydralazine 100 mg TID. Continue amlodipine to 10 mg daily  -BB Has been deferred given RHF, deferred d/t bradycardia now  -Aldosterone antagonist deferred while other medical therapy is prioritized  -SCD prophylaxis CRT-D - interrogation ordered.   Fluid status Hypervolemic: 100mg torsemide TID is PTA regimen. Took am dose, receive 60mg furosemide and 7mg bumex with appropriate response. -Give 5mg bumex followed  by 2mg/hr gtt               -Strict I/Os              -1800ml FR              -Daily Weights               -BID BMP + Mg                K>4, Mg>2  MAP: Goal 65-85, 85 in the ED  LDH: 269, stable  Anticoagulation: Goal 1.7-2.3 for recurrent nose-bleeding.   Antiplatelet: ASA 81 mg po daily  CardioMems: PAD goal is 21-24.     Heartmate 3 LEFT VS  Flow (Lpm): 5.1 Lpm  Pulse Index (PI): 3.2 PI  Speed (rpm): 5800 rpm  Power (muhammad): 4.8 muhammad  Current Hct settin    Abnormal LFTs, improving  Abdominal tenderness, resolved   Has had elevated LFTS for some time, likely RV failure but need to consider medication effect as well, especially in the setting of amiodarone. Ascites likely present. 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 GGT here is impressively elevated. RUQ US and CT Chest/Abd/Pelvis unimpressive.   -CTM    CKD stage III  Baseline has increased over the last year. Current B/l is around 1.8-2.2.   - Improved with diuresis.   - Diuretic management as above     MSSA Driveline infection, ongoing drainage, but improving  Following with ID. No acute concerns. Wounds unimpressive today.   - Continue PTA daily daptomycin (gets at his home hospital)   - Continue cipro 750 mg BID       Prolonged QTC, has multiple QTC prolonging medications. EKG obtained today, qtc is by read 584, then 643, but note that he is v-pace.  - Trend inpatient and adjust as needed     A. Fib.  Sinus Bradycardia  History of Afib w/ RVR and aberrancy vs. FDC vs. AT vs. Slow VT. S/p DCCV on  back into sinus rhythm. Has been tachycardic in the past but now in sinus bradycardia with increased RV pacing.  - Continue on amiodarone     HTN  Near goal today  - Continue hydralazine and amlodipine     History of HIT  - Avoid heparin products      Diet: Fluid restriction 1800 ML FLUID  Combination Diet Low Saturated Fat Na <2400mg Diet, No Caffeine Diet    DVT Prophylaxis: Warfarin  Ismael  "Catheter: Not present  Cardiac Monitoring: ACTIVE order. Indication: Acute decompensated heart failure (48 hours)  Code Status: Full Code        Clinically Significant Risk Factors        # Hypokalemia: Lowest K = 3.2 mmol/L in last 2 days, will replace as needed       # Hypoalbuminemia: Lowest albumin = 2.8 g/dL at 3/24/2023  7:49 AM, will monitor as appropriate            # Overweight: Estimated body mass index is 28.33 kg/m  as calculated from the following:    Height as of this encounter: 1.702 m (5' 7\").    Weight as of this encounter: 82.1 kg (180 lb 14.4 oz)., PRESENT ON ADMISSION         Disposition Plan   Expected discharge: 2 - 3 days, recommended to prior living arrangement once fluid volume status optimized on oral medication.    Entered: German Velez Reyes, MD 03/26/2023, 1:26 PM   The patient's care was discussed with the Attending Physician, Dr. Dutton..      German Velez Reyes, MD, PhD  Internal Medicine PGY1  [facility]  ______________________________________________________________________    Interval History   Care team notes reviewed. Abdominal pain and tenderness improved today. No major complaints.     Physical Exam   Vital Signs: Temp: 98.3  F (36.8  C) Temp src: Oral   Pulse: 63   Resp: 16 SpO2: 93 % O2 Device: None (Room air)    Weight: 180 lbs 14.4 oz    GENERAL: Appears comfortable, in no acute distress. Resting in bed.   HEENT: Eye symmetrical, no discharge or icterus bilaterally. Mucous membranes moist and without lesions.  CV: Hum of HM3, occasional S1S2. JVD to earlobe.   RESPIRATORY: Respirations regular, even, and unlabored. Lungs CTA throughout.   GI: Soft,  normoactive bowel sounds. No tenderness, rebound, guarding.   EXTREMITIES: 3+ b/l peripheral pitting lower extremity edema. All extremities are warm and well perfused  NEUROLOGIC: Alert and interacting appropriately. No focal deficits.   MUSCULOSKELETAL: No joint swelling or " tenderness.   SKIN: No jaundice. No rashes or lesions. Driveline dressing c/d/i.       Medical Decision Making       Please see A&P for additional details of medical decision making.      Data   ------------------------- PAST 24 HR DATA REVIEWED -----------------------------------------------    I have personally reviewed the following data over the past 24 hrs:    8.7  \   12.7 (L)   / 299     137 94 (L) 32.9 (H) /  103 (H)   3.2 (L) 31 (H) 1.37 (H) \       INR:  2.09 (H) PTT:  N/A   D-dimer:  N/A Fibrinogen:  N/A       Imaging results reviewed over the past 24 hrs:   No results found for this or any previous visit (from the past 24 hour(s)).

## 2023-03-26 NOTE — PLAN OF CARE
D: Admitted with fluid overload     PMH: chronic systolic heart failure secondary to NICM now s/p HM 3 on 2/18, moderate CAD, HTN, ABHINAV on CPAP, DM2, CKD Stage III, ANA, VT, Afib w/AVR S/p DCCV, chronic driveline infections     I: Monitored vitals and assessed pt status.   Running: Bumex @ 2mg/hr  PRN: Ambien, Robaxin  Tele: V paced  O2: room air, home CPAP at night  Mobility: independent     A: A0x4. VSS. LVAD #s WNL. Afebrile. Urinating adequately. LBM 3/25. C/o generalized body cramps. Relieved with Robaxin.     P: Continue to monitor Pt status and report changes to Cards 2. Continue to diurese.

## 2023-03-27 NOTE — PROGRESS NOTES
Care Management Follow Up    Length of Stay (days): 4    Expected Discharge Date: 03/30/2023     Concerns to be Addressed: Discharge planning  Patient plan of care discussed at interdisciplinary rounds: Yes    Anticipated Discharge Disposition: Home     Anticipated Discharge Services: Outpatient Infusion  Anticipated Discharge DME: No new DME anticipated    Education Provided on the Discharge Plan: Yes  Patient/Family in Agreement with the Plan: Yes    Referrals Placed by CM/SW: Outpatient Infusion    Additional Information:  Pt with LVAD, history of recurrent driveline infections admitted with volume overload. Pt receives outpatient IV antibiotics and wound care at [facility]. ID consult pending due to new growth on cultures. Per team, pt may also require IV diuretics after discharge. This writer called and updated [place] pharmacist at [facility] who confirmed they can administer IV lasix or bumex at pt's daily infusion appointments if ordered.     [facility]   Phone: [phone]   Fax: [phone]     CC will continue to monitor patient's medical condition and progress towards discharge.  Carmita Farnsworth RN BSN  6C Unit Care Coordinator  Phone number: [phone]  Pager: [phone]

## 2023-03-27 NOTE — PROGRESS NOTES
[facility]    Cardiology Progress Note- Cardiology        Date of Admission:  3/23/2023     Assessment & Plan: SL   Eliseo Tanner is a 67 year old male with chronic systolic heart failure secondary to NICM now s/p HM 3 on 2/18, moderate CAD, HTN, ABHINAV on CPAP, DM2, CKD Stage III, ANA. His HM3 post-op course was complicated by retrosternal hematoma and bleeding in the lungs, RV failure,VT in ICU now on amiodarone and Afib w/AVR S/p DCCV on 2/28. He had pre-op proteus and enterococcus bacteremia and he has had MSSA bacteremia as well, s/p abx; later on had LDH elevation 2/2 to legionella c/b renal failure requiring temporary dialysis (off iHD since 3/10/2021). He was admitted for volume overload management and hospitalization complicated by driveline infection antibiotic resistance w/o clinical signs.     Today:  -Continue diuresis   -ID consulted today for LVAD driveline infection with new antibiotic resistance    -Daptomycin discontinued and started on Bactrim Single Strength qday and continue Cipro    Chronic SCHF secondary to NICM s/p HM III with RV dysfunction.   Implanted 2/18/20 and complicated by bleeding. Presented in the morning to clinic for follow up of LVAD and volume overload. He has been struggling to maintain fluid off with 100mg torsemide TID. He has noticed worsening of edema for the past 2 weeks and increased in weight, although not specific. No LVAD alarms but frequent PI events on interrogation 3/23/23. We will continue to manage his volume status for optimization. Will undergo possible subcutaneous furosemide as outpatient due to PO failure. Holding on changes on afterload until after volume status euvolemic.      Stage D, NYHA Class IIIA  -ACEi/ARB Hydralazine 100 mg TID. Continue amlodipine to 10 mg daily  -BB Has been deferred given RHF, deferred d/t bradycardia now  -Aldosterone antagonist deferred while other medical therapy is  prioritized  -SCD prophylaxis CRT-D - interrogation ordered.   Fluid status Hypervolemic: 100mg torsemide TID is PTA regimen. Took am dose, receive 60mg furosemide and 7mg bumex with appropriate response.-Continue Bumex gtt   -discharge on bumex 6mg TID              -Strict I/Os              -1800ml FR              -Daily Weights               -BID BMP + Mg                K>4, Mg>2  MAP: Goal 65-85, 85 in the ED  LDH: 269, stable  Anticoagulation: Goal 1.7-2.3 for recurrent nose-bleeding.   Antiplatelet: ASA 81 mg po daily  CardioMems: PAD goal is 21-24.     Heartmate 3 LEFT VS  Flow (Lpm): 5.2 Lpm  Pulse Index (PI): 3.3 PI  Speed (rpm): 5800 rpm  Power (muhammad): 4.8 muhammad  Current Hct settin    MSSA and Pseudomonas Driveline infection   Fluoroquinolone resistance   Following with ID as outpatient. Wound was cultured during admission evaluation due to non-specific abdominal pain and tenderness. Cultures and susceptibilities showed Pseudomonas that is now resistant to fluoroquinolones and new Staph werneri. No abscess on CT scan observed and symptoms have improved now. ID consulted for recommendations on management of this chronic infection. No current clinical signs of infection.  - ID consult today, appreciate recs    - Discontinue daptomycin and start Bactrim Single Strength    - Continue cipro 750 mg BID      Abnormal LFTs, improving  Abdominal tenderness, resolved   Has had elevated LFTS for some time, likely RV failure but need to consider medication effect as well, especially in the setting of amiodarone. Ascites likely present. 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 GGT here is impressively elevated. RUQ US and CT Chest/Abd/Pelvis unimpressive.   -CTM    CKD stage III  Baseline has increased over the last year. Current B/l is around 1.8-2.2.   - Improved with diuresis.   - Diuretic management as above     Prolonged QTC, has multiple QTC prolonging  "medications. EKG obtained today, qtc is by read 584, then 643, but note that he is v-pace.  - Trend inpatient and adjust as needed     H/o  A. Fib  H/o Sinus Bradycardia  History of Afib w/ RVR and aberrancy vs. longterm vs. AT vs. Slow VT. S/p DCCV on 2/28 back into sinus rhythm. Has been tachycardic in the past but now in sinus bradycardia with increased RV pacing.  - Continue on amiodarone     HTN  Near goal today  - Continue hydralazine and amlodipine     History of HIT  - Avoid heparin products      Diet: Fluid restriction 1800 ML FLUID  Combination Diet Low Saturated Fat Na <2400mg Diet, No Caffeine Diet    DVT Prophylaxis: Warfarin  Guevara Catheter: Not present  Cardiac Monitoring: ACTIVE order. Indication: Acute decompensated heart failure (48 hours)  Code Status: Full Code        Clinically Significant Risk Factors        # Hypokalemia: Lowest K = 3.2 mmol/L in last 2 days, will replace as needed       # Hypoalbuminemia: Lowest albumin = 2.8 g/dL at 3/24/2023  7:49 AM, will monitor as appropriate            # Overweight: Estimated body mass index is 28.19 kg/m  as calculated from the following:    Height as of this encounter: 1.702 m (5' 7\").    Weight as of this encounter: 81.6 kg (180 lb)., PRESENT ON ADMISSION         Disposition Plan   Expected discharge: 2 - 3 days, recommended to prior living arrangement once fluid volume status optimized on oral medication and management of complex driveline infection.    Entered: German Velez Reyes, MD 03/27/2023, 9:04 AM   The patient's care was discussed with the Attending Physician, Dr. Dutton..      German Velez Reyes, MD, PhD  Internal Medicine PGY1  [facility]  ______________________________________________________________________    Interval History   Care team notes reviewed. Denies abdominal pain. Continues to have LE edema.     Physical Exam   Vital Signs: Temp: 98.3  F (36.8  C) Temp src: Oral   Pulse: 70   Resp: " 16 SpO2: 96 % O2 Device: None (Room air)    Weight: 180 lbs 0 oz    GENERAL: Appears comfortable, in no acute distress. Resting in bed.   HEENT: Eye symmetrical, no discharge or icterus bilaterally. Mucous membranes moist and without lesions.  CV: Hum of HM3, occasional S1S2. JVD to earlobe.   RESPIRATORY: Respirations regular, even, and unlabored. Lungs CTA throughout.   GI: Soft,  normoactive bowel sounds. No tenderness, rebound, guarding.   EXTREMITIES: 3+ b/l peripheral pitting lower extremity edema. All extremities are warm and well perfused  NEUROLOGIC: Alert and interacting appropriately. No focal deficits.   MUSCULOSKELETAL: No joint swelling or tenderness.   SKIN: No jaundice. No rashes or lesions. Driveline dressing c/d/i.       Medical Decision Making       Please see A&P for additional details of medical decision making.      Data   ------------------------- PAST 24 HR DATA REVIEWED -----------------------------------------------    I have personally reviewed the following data over the past 24 hrs:    9.4  \   12.2 (L)   / 281     136 93 (L) 38.0 (H) /  92   3.3 (L) 31 (H) 1.53 (H) \       INR:  1.97 (H) PTT:  N/A   D-dimer:  N/A Fibrinogen:  N/A       Imaging results reviewed over the past 24 hrs:   No results found for this or any previous visit (from the past 24 hour(s)).

## 2023-03-27 NOTE — PLAN OF CARE
Goal Outcome Evaluation:      Plan of Care Reviewed With: patient    Overall Patient Progress: improvingOverall Patient Progress: improving    Outcome Evaluation: started on new antibiotics, drive line drainage decreasing per pt report    Patient admitted for hypervolemia.  PMH of HF due to NICM, CAD, HTN, ABHINAV, CKD3, VT with subsequent cardioversion, and HeartMate 3 placement in 2020.     Neuro: A&O x4, denied pain and dizziness.  Respiratory:  Denied dyspnea on exertion or shortness of breath, lung sounds diminished in left lung base.  Cardiac: LVAD hum noted.  Had 1-3+ edema in left ankle and foot, bumetanide gtt restarted.  HeartMate LVAD running at a fixed rate of 5800 rpm, no alarms during shift, hematocrit adjusted.  Flow 5.1-5.2, PI 2.9-3.3.  Drive line site erythematous with moderate amount of purulent drainage, dressing changed per orders.    GI: Denied nausea, bowel sounds normoactive.  Had a bowel movement during shift.  : Had good urine output, see I&O flowsheet.  Skin: See PCS for assessment and treatment of wounds and surgical incisions.   VS: In sinus rhythm with first degree AV block, BBB, and occasional V pacing and PVCs.  HR 60s-70s, Doppler MAPs 80s-90s, SaO2 93-96% on room air.    Drips:  Bumetanide gtt restarted at 1 mg/hr (4 ml/hr).    Electrolytes: Potassium and magnesium replaced per protocol.  Pain: Denied.  Tests/procedures: None performed during shift.    Mobility: Ambulated in room independently with steady gait.    Plan:  Continue to monitor pain, VS, heart rhythm, LVAD function, drive line site integrity, fluid status, bowel status, cardiac and respiratory status.  Notify care team of changes in patient condition or other concerns.  Potential discharge 3/28.

## 2023-03-27 NOTE — CONSULTS
DEV GENERAL INFECTIOUS DISEASES CONSULTATION     Patient:  Eliseo Tanner   Date of birth 1955, Medical record number [id number]  Date of Visit:  03/27/2023  Date of Admission: 3/23/2023  Consult Requester:Nader Cisneros MD          Assessment and Recommendations:   ID Problem List:   1. Chronic VAD driveline infection    -Most recently on daptomycin (for MSSA) and ciprofloxacin (for PsA)  2. H/o driveline infection with MSSA and MRSA s/p I&D 12/11/22 with wound vac for healing.  3. CKD stage III    RECOMMENDATION:  Continue PO ciprofloxacin 750 mg BID for treatment of previously isolated Pseudomonas from driveline site 3/02/23. Understandably, it does not cover most recently isolated strains (with flouroquinolone resistance) from culture 3/24/23, however there is no evidence of worsening infection, drainage, or healing which leads us to believe these isolated strains are not pathogenic.   Discontinue daptomycin and transition to PO Bactrim 1 DS tab daily (adjusted for renal function) for continued treatment of MSSA.   Staphylococcus warneri is likely a skin contaminant.  Continue above antibiotic regimen until ID clinic follow up in 2 weeks (requested virtual visit). Further treatment to be discussed at that time.   Recommend keeping current central access if able to do so outside of the hospital until ID follow up.     Thank you for the consult. Infectious disease will sign off at this time. Do not hesitate to contact us with additional questions or change in patient's clinical status.      ASSESSMENT:  Eliseo Tanner is a 67 year old male with PMHx significant for NICM s/p HM3 LVAD, CKD III, diabetes mellitus type II, chronic LVAD driveline infection (on daptomycin and ciprofloxacin at admission) who presents to the ED with lower extremity swelling and weight gain. ID consulted for management of antibiotics given new culture data.     Transient leukocytosis on admission, now resolved without change in abx.  CT AP w/o contrast with minimal surrounding fluid along driveline in subcutaneous soft tissues-similar to prior. No discreet collection.     With no change in infectious signs/symptoms on admission, no sustained leukocytosis, fevers or increased drainage, it seems as though his chronic infection is stable to improving on current regimen. Isolated PsA strains that show resistance to flouroquinolones could be nonpathogenic in this case while his previous PsA strains have not grown on culture. We will continue Cipro and switch IV daptomcyin to PO Bactrim 1 sliding scale tab daily with ID clinic follow up in 2 weeks to reassess.     Patient was discussed with Dr. Menjivar.    Roxana Mclaughlin PA-C  Infectious Diseases  Pager #[phone]          History of Present Illness:   Eliseo Tanner is a 67 year old male with PMHx significant for NICM s/p HM3 LVAD, CKD III, diabetes mellitus type II, chronic LVAD driveline infection (on daptomycin and ciprofloxacin at admission) who presents to the ED with lower extremity swelling and weight gain.    Denied infectious symptoms on presentation (no fever, cough, or SOB). On discussion today, patient states he has felt like his driveline site has been improving. Denied worsened drainage, appearance, increased pain, fevers, chills/sweats, or feeling ill. He feels as though driveline site has improved since starting Cipro on 3/10/22. Has tolerated both antibiotics well.           Review of Systems:   CONSTITUTIONAL:  No fevers, chills or night sweats.   EYES: negative for icterus, redness, or purulent drainage.   ENT:  negative for nasal congestion, rhinorrhea, or sore throat.  RESPIRATORY:  negative for cough with sputum and dyspnea.  CARDIOVASCULAR:  negative for chest pain or palpitations.  GASTROINTESTINAL:  negative for nausea, vomiting, diarrhea and constipation.  GENITOURINARY:  negative for dysuria, frequency, or urgency.   HEME:  No easy bruising or bleeding.  INTEGUMENT:   negative for rash and pruritus.  NEURO:  Negative for headache, vision changes, or numbness/tingling in extremities.         Past Medical History:     Past Medical History:   Diagnosis Date    Chronic systolic congestive heart failure (H)     History of implantable cardioverter-defibrillator (ICD) placement     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)     MSSA    Legionella pneumonia (H)     LVAD (left ventricular assist device) present (H)     MSSA bacteremia 11/2020            Past Surgical History:     Past Surgical History:   Procedure Laterality Date    ANESTHESIA CARDIOVERSION N/A 02/28/2020    Procedure: ANESTHESIA, FOR CARDIOVERSION;  Surgeon: GENERIC ANESTHESIA PROVIDER;  Location: [facility] OR    CV CARDIOMEMS WITH RIGHT HEART CATH N/A 09/20/2022    Procedure: Pulmonary Arterial Pressure Sensor Placement CPT Codes to be cleared by financial securing for this implant. [id number] and ;  Surgeon: Dalton Baeza MD;  Location: [facility] CARD[facility] C[facility]    CV CENTRAL VENOUS CATHETER PLACEMENT N/A 02/13/2020    Procedure: Central Venous Catheter Placement;  Surgeon: Chente Moss MD;  Location: [facility]    CV INTRA AORTIC BALLOON N/A 02/07/2020    Procedure: Intra-Aortic Balloon Pump Insertion;  Surgeon: Jose Baldwin MD;  Location: [facility]    CV INTRA AORTIC BALLOON N/A 02/13/2020    Procedure: Intra-Aortic Balloon Pump Insertion;  Surgeon: Chente Moss MD;  Location: [facility]    CV RIGHT HEART CATH MEASUREMENTS RECORDED N/A 09/21/2020    Procedure: CV RIGHT HEART CATH;  Surgeon: Dalton Baeza MD;  Location: [facility]    CV RIGHT HEART CATH MEASUREMENTS RECORDED N/A 01/07/2021    Procedure: Right Heart Cath;  Surgeon: Chun Ball MD;  Location: [facility]    CV RIGHT HEART CATH MEASUREMENTS RECORDED N/A 02/10/2022    Procedure: CV RIGHT  HEART CATH;  Surgeon: Dalton Baeza MD;  Location:  HEART CARDIAC CATH LAB    CV RIGHT HEART CATH MEASUREMENTS RECORDED N/A 2022    Procedure: Right Heart Catheterization [[id number]];  Surgeon: Dalton Baeza MD;  Location:  HEART CARDIAC CATH LAB    CV RIGHT HEART CATH MEASUREMENTS RECORDED N/A 2022    Procedure: Right Heart Cath;  Surgeon: Chente Moss MD;  Location:  HEART CARDIAC CATH LAB    CV SWAN LUCIANA PROCEDURE N/A 2020    Procedure: Mercedita Luciana Procedure;  Surgeon: Chente Moss MD;  Location:  HEART CARDIAC CATH LAB    EP ICD Bilateral 2020    Procedure: EP ICD;  Surgeon: Dali Day MD;  Location:  HEART CARDIAC CATH LAB    INCISION AND DRAINAGE CHEST WASHOUT, COMBINED N/A 2022    Procedure: INCISION AND DRAINAGE OF DRIVELINE;  Surgeon: Mac Jaramillo MD;  Location: [facility] OR    INSERT VENTRICULAR ASSIST DEVICE LEFT (HEARTMATE II) N/A 2020    Procedure: INSERTION, LEFT VENTRICULAR ASSIST DEVICE (HEARTMATE III);  Surgeon: Mac Jaramillo MD;  [facility]    IR CVC TUNNEL PLACEMENT > 5 YRS OF AGE  2021    IR CVC TUNNEL REMOVAL RIGHT  2021    MIDLINE INSERTION - DOUBLE LUMEN Left 12/15/2022    left basilic 5 fr dl midline 20 cm    THORACOSCOPY Right 2020    Procedure: RIGHT VIDEO-ASSISTED THORASCOPIC SURGERY, EVACUATION OF HEMOTHORAX, PLACEMENT OF CHEST TUBES;  Surgeon: William Gan MD;  Location:  OR            Family History:     No family history on file.         Social History:     Social History     Tobacco Use    Smoking status: Former     Types: Cigarettes     Quit date: 1994     Years since quittin.0    Smokeless tobacco: Never   Substance Use Topics    Alcohol use: Not Currently     History   Sexual Activity    Sexual activity: Not on file            Current Medications:      allopurinol  200 mg Oral or Feeding Tube Daily    amiodarone  200 mg Oral Daily     amLODIPine  10 mg Oral Daily    aspirin  81 mg Oral Daily    ciprofloxacin  750 mg Oral BID    DAPTOmycin (CUBICIN) intermittent infusion  500 mg Intravenous Q24H    empagliflozin  10 mg Oral Daily    ferrous sulfate  325 mg Oral Daily with breakfast    finasteride  5 mg Oral Daily    hydrALAZINE  100 mg Oral TID    isosorbide mononitrate  120 mg Oral Daily    lactobacillus rhamnosus (GG)  1 capsule Oral BID    levothyroxine  88 mcg Oral QAM AC    magnesium oxide  400 mg Oral or Feeding Tube BID    multivitamin RENAL  1 tablet Oral Daily    pantoprazole  40 mg Oral Daily    PARoxetine  20 mg Oral Daily    potassium chloride ER  60 mEq Oral BID    sodium chloride (PF)  3 mL Intracatheter Q8H    spironolactone  12.5 mg Oral Daily    tamsulosin  0.8 mg Oral QPM    warfarin ANTICOAGULANT  3 mg Oral ONCE at 18:00    Warfarin Therapy Reminder  1 each Oral See Admin Instructions            Allergies:     Allergies   Allergen Reactions    Heparin      HIT screen positive 2/14/20, reflex DAVINA negative; however heme recommended treating as if positive  HIT screen negative 2/11/20    Oxycodone Itching and Other (See Comments)    Chlorhexidine Rash            Physical Exam:   Vitals were reviewed  Patient Vitals for the past 24 hrs:   Temp Temp src Pulse Resp SpO2 Weight   03/27/23 1200 -- -- 60 -- -- --   03/27/23 0803 98.3  F (36.8  C) Oral 70 16 96 % --   03/27/23 0625 -- -- -- -- -- 81.6 kg (180 lb)   03/27/23 0445 98  F (36.7  C) Oral 61 16 96 % --   03/26/23 2246 98.8  F (37.1  C) Oral 70 16 95 % --   03/26/23 1900 98.1  F (36.7  C) Oral 69 16 95 % --   03/26/23 1558 98.1  F (36.7  C) Oral 69 14 96 % --       Physical Examination:  Constitutional: Pleasant male seen sitting up in bed, in NAD. Alert and interactive.   HEENT: NCAT, anicteric sclerae, conjunctiva clear. Moist mucous membranes.   Respiratory: Non-labored breathing on RA, good air exchange.  GI:  Abdomen is soft, non-distended.  Skin: Warm and dry. No rashes.  Driveline site change by nursing while examined by me- there was a bit of reddened scar tissue, but otherwise no spreading erythema, some fibrinous drainage near exit site, no foul odor.  Musculoskeletal: Extremities grossly normal.   Neurologic: A &O x3, speech normal, answering questions appropriately. Moves all extremities spontaneously. Grossly non-focal.  Neuropsychiatric: Mentation and affect normal/appropriate.  VAD: PICC is c/d/i with no erythema, drainage, or tenderness.         Laboratory Data:     Inflammatory Markers    Recent Labs   Lab Test 12/08/22  1256 08/17/21  0807 02/16/21  2219 02/15/21  1910 02/11/21  0838 12/17/20  0756 12/14/20  0815 11/05/20  0000 09/21/20  1438   SED 49*  --  72* 47*  --   --   --   --   --    CRP  --  4.9 270.0* 270.0* 8.6 8.0 6.1 54.9 6.6       Hematology Studies    Recent Labs   Lab Test 03/27/23  0528 03/26/23  0631 03/25/23  0910 03/24/23  0749 03/23/23  1735 03/23/23  0732 03/09/21  0510 03/07/21  0543 03/06/21  0430 03/05/21  0400 03/04/21  0405 03/03/21  0455 03/02/21  0401   WBC 9.4 8.7 8.4 8.4 12.4* 10.1   < > 4.5 4.4 5.4 5.6 7.0 7.3   ANEU  --   --   --   --   --   --   --  2.6 2.8 3.6 3.8 5.3 5.6   AEOS  --   --   --   --   --   --   --  0.3 0.2 0.2 0.1 0.1 0.1   HGB 12.2* 12.7* 11.7* 11.1* 11.7* 12.1*   < > 7.9* 8.0* 7.7* 8.2* 8.1* 9.2*   MCV 87 88 88 87 86 86   < > 82 82 81 81 80 82    299 278 279 283 273   < > 208 196 210 224 236 218    < > = values in this interval not displayed.       Metabolic Studies     Recent Labs   Lab Test 03/27/23  0528 03/26/23  1712 03/26/23  1320 03/26/23  0631 03/25/23  1722 03/25/23  0910    134*  --  137 138 137   POTASSIUM 3.3* 3.5 4.0 3.2* 3.8 3.7   CHLORIDE 93* 92*  --  94* 97* 98   CO2 31* 30*  --  31* 28 28   BUN 38.0* 36.4*  --  32.9* 32.3* 31.2*   CR 1.53* 1.45*  --  1.37* 1.31* 1.22*   GFRESTIMATED 49* 52*  --  56* 59* 64       Hepatic Studies    Recent Labs   Lab Test 03/24/23  0749 03/23/23  1734  03/23/23  0732 03/02/23  0832 02/07/23  1826 12/20/22  0408   BILITOTAL 0.7  0.6 0.6 0.6 0.9 0.5 0.4   ALKPHOS 216* 289* 307* 339* 310* 285*   ALBUMIN 2.8* 3.1* 3.2* 3.5 3.3* 3.1*   * 146* 148* 133* 122* 159*   ALT 85* 89* 92* 88* 84* 81*       Microbiology:  Culture   Date Value Ref Range Status   03/24/2023 3+ Pseudomonas aeruginosa (A)  Preliminary   03/24/2023 3+ Pseudomonas aeruginosa (A)  Preliminary   03/24/2023 3+ Pseudomonas aeruginosa (A)  Preliminary   03/24/2023 1+ Staphylococcus warneri (A)  Preliminary     Comment:     Susceptibilities not routinely done, refer to antibiogram to view typical susceptibility profiles   03/02/2023 1+ Pseudomonas aeruginosa (A)  Final   03/02/2023 1+ Pseudomonas aeruginosa (A)  Final   03/02/2023 1+ Staphylococcus aureus (A)  Corrected     Comment:     Susceptibility testing requested by Sunni Jimenez pharmacist ([id number]) to check slightly elevated vanco result. 3.8.23 at 1047  Susceptibility testing requested by Sunni Jimenez, pharmacist for dalb[facility]ancin.    03/02/2023 1+ Staphylococcus aureus (A)  Final   03/02/2023 No anaerobic organisms isolated  Final   12/11/2022 No anaerobic organisms isolated  Final   12/11/2022 No Growth  Final   12/11/2022 1+ Staphylococcus aureus (A)  Final     Comment:     Susceptibilities done on previous cultures   12/11/2022 1+ Staphylococcus aureus (A)  Final     Comment:     Susceptibilities done on previous cultures   12/08/2022 No Growth  Final   12/08/2022 2+ Staphylococcus aureus (A)  Final   12/08/2022 2+ Staphylococcus aureus (A)  Final   12/08/2022 No Growth  Final   10/18/2022 2+ Staphylococcus aureus (A)  Final   10/18/2022 2+ Staphylococcus aureus (A)  Final   09/20/2022 2+ Staphylococcus aureus (A)  Final   09/20/2022 1+ Normal freeman  Final   09/20/2022 No anaerobic organisms isolated  Final   02/10/2022 1+ Staphylococcus aureus (A)  Final   02/10/2022 1+ Cutibacterium (Propionibacterium) acnes (A)  Final     Comment:      Susceptibility testing of Cutibacterium (Propionibacterium) species is not done from this source. This organism is susceptible to penicillin and cefotaxime and most are susceptible to clindamycin.   08/17/2021 No Growth  Final   08/17/2021 No Growth  Final   08/17/2021 1+ Staphylococcus aureus (A)  Final   08/17/2021 No anaerobic organisms isolated  Final       Urine Studies    Recent Labs   Lab Test 02/16/21  0225 11/17/20  0825 02/22/20  0944 02/13/20  0334 02/07/20 2029   LEUKEST Negative Negative Small* Small* Negative   WBCU 0 0 2 81* 3       Vancomycin Levels    Recent Labs   Lab Test 12/10/22  1948 02/16/20  0400 02/14/20  0331   VANCOMYCIN 11.3 18.5 16.2       Hepatitis B Testing   Recent Labs   Lab Test 02/28/21  1155 02/12/20  0440 02/08/20  0408   HBCAB  --  Nonreactive Nonreactive   HEPBANG Nonreactive Nonreactive Nonreactive     Hepatitis C Testing     Hepatitis C Antibody   Date Value Ref Range Status   02/12/2020 Nonreactive NR^Nonreactive Final     Comment:     Assay performance characteristics have not been established for newborns,   infants, and children     02/08/2020 Nonreactive NR^Nonreactive Final     Comment:     Assay performance characteristics have not been established for newborns,   infants, and children       Respiratory Virus Testing    No results found for: RS, FLUAG    IMAGING  CT AP w/o contrast (3/24/22)   IMPRESSION:   1. Small right pleural effusion and adjacent compressive atelectasis.  2. No acute pathology is visualized within the abdomen or pelvis. No  CT findings to explain the patient's abdominal pain.  3. LVAD with minimal surrounding fluid along the drive line in the  subcutaneous soft tissues of the anterior right chest similar to  prior. No discrete organized fluid collection or significant  inflammatory change.  4. Prominent bilateral inguinal lymph nodes have increased in size  from prior, favored reactive.  5. Additional chronic and incidental findings as detailed  in the body  report are stable from prior.

## 2023-03-27 NOTE — PLAN OF CARE
D: Admitted with fluid overload     PMH: chronic systolic heart failure secondary to NICM now s/p HM 3 on 2/18, moderate CAD, HTN, ABHINAV on CPAP, DM2, CKD Stage III, ANA, VT, Afib w/AVR S/p DCCV, chronic driveline infections     I: Monitored vitals and assessed pt status.   Changed: Bumex gtt stopped  PRN: Ambien, Robaxin  Tele: V paced  O2: room air, home CPAP at night  Mobility: independent     A: A0x4. VSS. MAPs 86-92. LVAD #s WNL. Afebrile. Urinating adequately. LBM 3/26. C/o generalized body cramps. Relieved with Robaxin. Nosebleed x1.      P: Continue to monitor Pt status and report changes to Cards 2. Switch to PO diuretics today. Discharge hopefully Tuesday.

## 2023-03-27 NOTE — DISCHARGE SUMMARY
[facility]    Cardiology Discharge Summary- Cardiology         Date of Admission:  3/23/2023  Date of Discharge:  3/28/2023  Discharging Provider: Dr Dutton      Discharge Diagnoses   Chronic SCHF secondary to NICM s/p HM III with RV dysfunction.  MSSA and Pseudomonas Driveline infection   Abnormal LFTs, improving  Abdominal tenderness, resolved   CKD stage III  Prolonged QTC  H/o  A. Fib  H/o Sinus Bradycardia  HTN  H/o HIT    Follow-ups Needed After Discharge   Cardiology and ID  Unresulted Labs Ordered in the Past 30 Days of this Admission     Date and Time Order Name Status Description    3/24/2023  1:32 PM Abscess Aerobic Bacterial Culture Routine Preliminary           Discharge Disposition   Discharged to home  Condition at discharge: Good    Hospital Course   Eliseo Tanner is a 67 year old male with chronic systolic heart failure secondary to NICM (Stage D, NYHA Class IIIA) now s/p HM 3 on 2/18, moderate CAD, HTN, ABHINAV on CPAP, DM2, CKD Stage III, ANA. His HM3 post-op course was complicated by retrosternal hematoma and bleeding in the lungs, RV failure, VT in ICU now on amiodarone, and Afib w/AVR S/p DCCV on 2/28. 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     He was admitted for volume overload management and hospitalization complicated by driveline infection antibiotic resistance w/o clinical or radiographic signs. He had been struggling to maintain fluid off with 100mg torsemide TID and HCTZ. He had noticed worsening of edema for the prior 2 weeks and increase in weight (~20lb). No LVAD alarms but frequent PI events on interrogation 3/23/23. He was initiating on a bumex gtt and with a net negative fluid loss of 20 L. Decision made to continue PTA torsemide 100 mg TID and add hydrochlorothiazide 75 mg twice weekly for additional diuresis on discharge.    Due to new  fluoroquinolone resistance and new Stap werneri in the setting of chronic infection, although asymptomatic, ID was consulted for recommendations on antibiotic management. Had some abdominal pain and tenderness at the time of admission. Workup was negative for systemic infection and abscess. ID recommended to stop Daptomycin and switch to bactrim and continue cipro as the sense is that the new strains are non-virulent in the absense of clinical signs of infection.      -ACEi/ARB Hydralazine 100 mg TID. Continue amlodipine to 10 mg daily  -BB Has been deferred given RHF, deferred d/t bradycardia now  -Aldosterone antagonist: continue PTA spironolactone 25 mg daily   -SCD prophylaxis CRT-D - interrogation here negative  -Diuresis: continue torsemide 100 mg TID but add hydrochlorothiazide 75 mg twice weekly for additional diuresis (was doing better on this regimen than bumex outpatient)  -LDH: stable  -Anticoagulation: Goal 1.7-2.3 for recurrent nose-bleeding. Was 1.81 on discharge.  -Antiplatelet: ASA 81 mg po daily  -CardioMems: PAD goal is 21-24.  -Driveline infection               - Bactrim Double Strength qDay (continue through follow up with ID)              - Continue cipro 750 mg BID (continue through follow up with ID)    Follow ups:  - LVAD clinic in 2 weeks  - Primary care with laboratory follow up in 3-5 days (BMP, Mg, INR)  - Infectious disease clinic follow up 4/14/23     Consultations This Hospital Stay   PHARMACY TO DOSE WARFARIN  WOUND OSTOMY CONTINENCE NURSE  IP CONSULT  CARE MANAGEMENT / SOCIAL WORK IP CONSULT  INFECTIOUS DISEASE GENERAL ADULT IP CONSULT    Code Status   Full Code        Alberto Garnica MD   [facility]  ______________________________________________________________________    Physical Exam   Vital Signs: Temp: 97.9  F (36.6  C) Temp src: Oral   Pulse: 64   Resp: 14 SpO2: 95 % O2 Device: None (Room air)    Weight: 176 lbs 12.8  oz    GENERAL: Appears comfortable, in no acute distress. Resting in bed.   HEENT: Eye symmetrical, no discharge or icterus bilaterally. Mucous membranes moist and without lesions.  CV: LVAD hum auscultated, occasional S1S2. JVP 10 cm H2O.   RESPIRATORY: Respirations regular, even, and unlabored. Lungs CTA throughout.   GI: Soft,  normoactive bowel sounds. No tenderness, rebound, guarding.   EXTREMITIES: Trace bilateral lower extremity edema. All extremities are warm and well perfused  NEUROLOGIC: Alert and interacting appropriately. No focal deficits.   MUSCULOSKELETAL: No joint swelling or tenderness.   SKIN: No jaundice. No rashes or lesions. Driveline dressing c/d/i.       Primary Care Physician   Jay Steele    Discharge Orders   Home Care  Wound Dressing Cares at Home    Significant Results and Procedures   Results for orders placed or performed during the hospital encounter of 03/23/23   XR Chest Port 1 View    Narrative    EXAM: XR CHEST PORT 1 VIEW  3/23/2023 5:26 PM    HISTORY: 67 years Male Volume Overload.     COMPARISON: Chest radiograph 9/2/2022.    TECHNIQUE: 50 degree portable AP view of the chest.    FINDINGS:   Postsurgical changes of the chest. Median sternotomy wires appear  intact. LVAD. Left chest wall implantable cardiac defibrillator  present with single lead terminating over the right ventricle. Lead  appears intact. Partially visualized left midline intravenous catheter  terminating over the left axilla.    Midline trachea. Enlarged cardiomediastinal silhouette. Blunted right  costophrenic angle. No discernible pneumothorax. Mildly reduced lung  volumes. Perihilar interstitial opacities, similar in appearance to  prior chest x-ray from 9/2/22. No acute or suspicious osseous  abnormalities.       Impression    IMPRESSION:   1.  Perihilar interstitial opacities, similar in appearance to prior  chest x-ray from 9/2/22.  2.  Small right pleural effusion.  3.  Cardiomegaly.    I have  personally reviewed the examination and initial interpretation  and I agree with the findings.    QUIQUE NICHOLS MD         SYSTEM ID:  [id number]   US Abdomen Limited w Doppler Complete    Narrative    EXAMINATION: US ABDOMEN LIMITED WITH DOPPLER COMPLETE 3/24/2023 8:46  AM     COMPARISON: Ultrasound abdomen 12/20/2022; CT, 12/8/2022    HISTORY: Rule out hepatic pathology and measure portal flow    TECHNIQUE: The right upper quadrant abdomen was scanned in standard  fashion with specialized ultrasound transducer(s) using both  gray-scale, color Doppler, and spectral flow techniques.    Findings:    Technically challenging exam due to abdominal bandaging in the  presence of an LVAD.    Liver: The liver demonstrates questionable subtle increased  echogenicity of the hepatic parenchyma with smooth hepatic contour. No  evidence of a focal hepatic mass. The liver measures 20.6 cm in  craniocaudal orientation.     Extrahepatic portal vein flow is antegrade at 23 cm/s.  Right portal vein flow is antegrade, measuring 16 cm/s.  Left portal vein flow is antegrade, measuring 16 cm/s.    Flow in the hepatic artery is towards the liver and:  94 cm/s peak systolic velocity  0.51 resistive index (previously 0.44)  Hepatic waveform altered by left ventricular assist device based  circulation.    The splenic vein is patent and flow is towards the liver.  The left,  middle, and right hepatic veins are patent with flow towards the IVC.  The IVC is patent with flow towards the heart.   The visualized aorta  is not dilated.    Gallbladder: Mild gallbladder wall thickening, measuring up to 4 mm.  Cholelithiasis. Gallbladder is nondistended. Nondependent gallbladder  wall echogenicities may represent tiny polyps versus adenomyomatosis.    Bile Ducts: Both the intra- and extrahepatic biliary system are of  normal caliber.  The common bile duct measures 5 mm.    Pancreas: The pancreas is largely obscured.    Right kidney: Both kidneys are of  normal echotexture, without mass or  hydronephrosis.   Renal lengths: right- 11.1 cm,       Impression    Impression:     1. Patent hepatic vasculature by Doppler evaluation. Altered hepatic  waveform likely secondary to left ventricular assist device based  circulation.  2. Mild hepatomegaly with hepatic parenchymal echogenicity, which may  be seen with parenchymal liver disease including hepatic steatosis.  3. Cholelithiasis, mild gallbladder wall thickening, nonspecific,  trace pericholecystic fluid likely secondary to small ascites.  Negative sonographic Yoon's sign with no distention of the  gallbladder and similar gallbladder wall thickening on prior  ultrasound, acute cholecystitis is considered less likely. Few  nondependent tiny gallbladder wall non shadowing echogenicities may  represent polyp versus adenomyomatosis.    I have personally reviewed the examination and initial interpretation  and I agree with the findings.    TRINIDAD COMER MD         SYSTEM ID:  [id number]   CT Chest Abdomen Pelvis w/o Contrast    Narrative    EXAMINATION: CT CHEST ABDOMEN PELVIS W/O CONTRAST, 3/24/2023 10:11 AM    TECHNIQUE:  Helical CT images from the thoracic inlet through the  symphysis pubis were obtained  without contrast.     COMPARISON: CT chest abdomen and pelvis 12/8/2022    HISTORY: 66 y/o with LVAD driveline infecton (chronic), with new  abdominal pain    FINDINGS:    Chest:   Left chest wall defibrillator with lead terminating in the right  ventricle. Stable positioning of LVAD. There is minimal surrounding  fluid along the drive line in the subcutaneous soft tissues of the  anterior right chest. No discrete organized fluid collection or  significant inflammatory change. Visualized thyroid is unremarkable.  Stable cardiomegaly. Coronary artery calcifications. The main  pulmonary artery and thoracic aorta are normal in caliber. No  axillary, mediastinal or hilar lymphadenopathy. Mildly patulous  esophagus.  Small hiatal hernia.    The central tracheobronchial tree is patent. No pneumothorax. New  small right pleural effusion and adjacent atelectasis. Mild apical  predominant emphysematous changes. Left basilar atelectasis. No  definite acute airspace disease. Unchanged few sub-4 mm pulmonary  nodules.     Abdomen and pelvis:   Cirrhotic morphology of the liver. Cholelithiasis. No intra or  extrahepatic biliary ductal dilation. The spleen and adrenal glands  are unremarkable. Fatty atrophy of the pancreas. Unremarkable  noncontrast appearance of the kidneys. No hydronephrosis or  hydroureter. Right posterior bladder wall diverticula. No pelvic mass.    Circumferential gastric thickening is probably related to  underdistention. The small and large bowel are normal in caliber. Mild  colonic diverticulosis without adjacent inflammatory change. Appendix  is normal. Trace abdominal pelvic ascites, similar to prior. No  intra-abdominal free air. Prominent bilateral inguinal lymph nodes  measuring 13 mm in short axis on the right and 15 mm in short axis on  the left, increased from prior, favored reactive. No abdominal aortic  aneurysm. Aortoiliac calcific atherosclerosis.    Bones and soft tissues: Small fluid containing periumbilical hernia.  No acute or aggressive appearing osseous lesion. Degenerative changes.  Stable stepwise anterolisthesis of L3 on L4 and L4 and L5. Median  sternotomy wires.      Impression    IMPRESSION:   1. Small right pleural effusion and adjacent compressive atelectasis.  2. No acute pathology is visualized within the abdomen or pelvis. No  CT findings to explain the patient's abdominal pain.  3. LVAD with minimal surrounding fluid along the drive line in the  subcutaneous soft tissues of the anterior right chest similar to  prior. No discrete organized fluid collection or significant  inflammatory change.  4. Prominent bilateral inguinal lymph nodes have increased in size  from prior, favored  reactive.  5. Additional chronic and incidental findings as detailed in the body  report are stable from prior.    I have personally reviewed the examination and initial interpretation  and I agree with the findings.    PEYMAN VERAS MD         SYSTEM ID:  [id number]     *Note: Due to a large number of results and/or encounters for the requested time period, some results have not been displayed. A complete set of results can be found in Results Review.       Discharge Medications   Current Discharge Medication List      START taking these medications    Details   methocarbamol (ROBAXIN) 500 MG tablet Take 1 tablet (500 mg) by mouth 3 times daily as needed for muscle spasms (Cramps)  Qty: 90 tablet, Refills: 1    Associated Diagnoses: Pain      sulfamethoxazole-trimethoprim (BACTRIM DS) 800-160 MG tablet Take 1 tablet by mouth daily  Qty: 21 tablet, Refills: 0    Associated Diagnoses: Wound infection; 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)         CONTINUE these medications which have CHANGED    Details   allopurinol (ZYLOPRIM) 100 MG tablet Take 2 tablets (200 mg) by mouth daily  Qty: 60 tablet, Refills: 1    Associated Diagnoses: LVAD (left ventricular assist device) present (H)      hydrochlorothiazide (HYDRODIURIL) 12.5 MG tablet Take 6 tablets (75 mg) by mouth twice a week  Qty: 90 tablet, Refills: 0    Comments: Please take every Monday and Friday, if can substitute for higher dose tab would be great  Associated Diagnoses: Acute on chronic congestive heart failure, unspecified heart failure type (H); LVAD (left ventricular assist device) present (H)      potassium chloride ER (KLOR-CON M) 20 MEQ CR tablet Take 4 tablets (80 mEq) by mouth 2 times daily  Qty: 540 tablet, Refills: 0    Associated Diagnoses: LVAD (left ventricular assist device) present (H); 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)      torsemide (DEMADEX) 100 MG tablet Take 1 tablet (100 mg)  by mouth 3 times daily  Qty: 90 tablet, Refills: 0    Comments: Please resume torsemide at 100 mg TID instead of bumex that was prior prescribed  Associated Diagnoses: Acute on chronic congestive heart failure, unspecified heart failure type (H); LVAD (left ventricular assist device) present (H)         CONTINUE these medications which have NOT CHANGED    Details   amiodarone (PACERONE) 200 MG tablet TAKE 1 TABLET BY MOUTH ONCE DAILY  Qty: 90 tablet, Refills: 3    Associated Diagnoses: LVAD (left ventricular assist device) present (H)      amLODIPine (NORVASC) 5 MG tablet Take 2 tablets (10 mg) by mouth daily  Qty: 180 tablet, Refills: 3    Associated Diagnoses: Chronic systolic congestive heart failure (H)      aspirin (ASA) 81 MG EC tablet Take 1 tablet (81 mg) by mouth daily  Qty: 90 tablet, Refills: 3    Associated Diagnoses: LVAD (left ventricular assist device) present (H)      B Complex-C-Folic Acid (RENAL) 1 MG CAPS Take 1 capsule by mouth daily  Qty: 30 capsule, Refills: 0    Comments: Further refills should go to primary care or nephrology  Associated Diagnoses: Chronic systolic congestive heart failure (H)      ciprofloxacin (CIPRO) 750 MG tablet Take 1 tablet (750 mg) by mouth 2 times daily  Qty: 28 tablet, Refills: 0    Associated Diagnoses: 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); Pseudomonas aeruginosa infection      co-enzyme Q-10 200 MG CAPS Take 200 mg by mouth daily      dapagliflozin (FARXIGA) 5 MG TABS tablet Take 5 mg by mouth daily      ferrous sulfate (FEROSUL) 325 (65 Fe) MG tablet Take 325 mg by mouth daily (with breakfast)      finasteride (PROSCAR) 5 MG tablet Take 1 tablet (5 mg) by mouth daily Helps with urinary retention.  Qty: 30 tablet, Refills: 0    Associated Diagnoses: Voiding difficulty      hydrALAZINE (APRESOLINE) 100 MG tablet Take 1 tablet (100 mg) by mouth 3 times daily  Qty: 270 tablet, Refills: 3    Associated Diagnoses: Chronic  systolic congestive heart failure (H)      isosorbide mononitrate (IMDUR) 120 MG 24 HR ER tablet Take 1 tablet (120 mg) by mouth daily for 90 days  Qty: 90 tablet, Refills: 0    Associated Diagnoses: Acute on chronic systolic congestive heart failure (H); LVAD (left ventricular assist device) present (H)      levothyroxine (SYNTHROID/LEVOTHROID) 88 MCG tablet Take 1 tablet (88 mcg) by mouth every morning (before breakfast)    Associated Diagnoses: Hypothyroidism, unspecified type      magnesium oxide (MAG-OX) 400 MG tablet Take 400 mg by mouth 2 times daily  Qty: 30 tablet, Refills: 1    Associated Diagnoses: LVAD (left ventricular assist device) present (H)      omeprazole (PRILOSEC) 20 MG DR capsule Take 20 mg by mouth daily      PARoxetine (PAXIL) 10 MG tablet Take 20 mg by mouth daily      senna-docusate (SENOKOT-S/PERICOLACE) 8.6-50 MG tablet Take 1 tablet by mouth 2 times daily as needed for constipation      spironolactone (ALDACTONE) 25 MG tablet Take 1 tablet (25 mg) by mouth daily for 90 days  Qty: 90 tablet, Refills: 0    Associated Diagnoses: LVAD (left ventricular assist device) present (H); 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)      tamsulosin (FLOMAX) 0.4 MG capsule Take 0.8 mg by mouth every evening This med helps with urinary retention  Qty: 30 capsule, Refills: 0    Associated Diagnoses: Voiding difficulty      zolpidem (AMBIEN) 5 MG tablet Take 5 mg by mouth nightly as needed for Insomnia      warfarin ANTICOAGULANT (COUMADIN) 2 MG tablet Take 4 mg by mouth daily         STOP taking these medications       DAPTOmycin (CUBICIN) 500 MG injection Comments:   Reason for Stopping:             Allergies   Allergies   Allergen Reactions     Heparin      HIT screen positive 2/14/20, reflex DAVINA negative; however heme recommended treating as if positive  HIT screen negative 2/11/20     Oxycodone Itching and Other (See Comments)     Chlorhexidine Rash

## 2023-03-28 NOTE — TELEPHONE ENCOUNTER
"ANTICOAGULATION  MANAGEMENT: Discharge Review    Eliseo ALISTAIR Ciro chart reviewed for anticoagulation continuity of care    Hospital Admission on 3/23-3/28/23 for hypervolemia:  - Continue torsemide 100 mg three times daily  - Add hydrochlorothiazide for additional water pill and decongestion twice weekly, Monday and Friday    ID consulted:  \"Due to new fluoroquinolone resistance and new Stap werneri in the setting of chronic infection, although asymptomatic, ID was consulted for recommendations on antibiotic management. Had some abdominal pain and tenderness at the time of admission. Workup was negative for systemic infection and abscess. ID recommended to stop Daptomycin and switch to bactrim and continue cipro as the sense is that the new strains are non-virulent in the absense of clinical signs of infection\"    Discharge disposition: Home    Results:    Recent labs: (last 7 days)     03/23/23  0732 03/23/23  1226 03/24/23  0749 03/25/23  0910 03/26/23  0631 03/27/23  0528 03/28/23  0508   INR 2.12* 2.20* 2.40* 2.22* 2.09* 1.97* 1.81*     Anticoagulation inpatient management:     less warfarin administered than maintenance regimen     3/23 3mg  3/24 2mg  3/25 2mg  3/26 3mg  3/27 3mg    Anticoagulation discharge instructions:     Warfarin dosing: home regimen continued   Bridging: No   INR goal change: No      Medication changes affecting anticoagulation: Yes:     - Start bactrim double strength tabs daily for LVAD infection  - Continue ciprofloxacin 750 mg twice daily for LVAD infection    Additional factors affecting anticoagulation: No     PLAN     Recommend to check INR on 3/31/23  Recommend to adjust dose to 2 mg every Mon, Wed, Fri; 4 mg all other days    Spoke with Eliseo    Anticoagulation Calendar updated    PACO MARQUEZ RN  "

## 2023-03-28 NOTE — PLAN OF CARE
Goal Outcome Evaluation:      Plan of Care Reviewed With: patient, spouse    Overall Patient Progress: improvingOverall Patient Progress: improving    Outcome Evaluation: decreased drainage from drive line site, verbalized readiness for discharge    Patient admitted for hypervolemia.  PMH of HF due to NICM, CAD, HTN, ABHINAV, CKD3, VT with subsequent cardioversion, and HeartMate 3 placement in 2020.     Neuro: A&O x4, denied pain and dizziness.  Respiratory:  Denied dyspnea on exertion or shortness of breath, lung sounds clear to auscultation.  Cardiac: LVAD hum noted.  Had 1+ edema in left ankle and foot, was given scheduled oral bumetanide.  HeartMate LVAD running at a fixed rate of 5800 rpm, no alarms during shift, hematocrit adjusted.  Flow 5.2, PI 2.9.  Drive line site erythematous with small amount of purulent drainage, dressing changed per orders.    GI: Denied nausea, bowel sounds normoactive.  Had a bowel movement during shift.  : Had good urine output, see I&O flowsheet.  Skin: See PCS for assessment and treatment of wounds and surgical incisions.   VS: In sinus rhythm with first degree AV block, BBB, and occasional V pacing.  HR 60s, Doppler MAPs 100s, SaO2 95% on room air.    Drips:  Bumetanide gtt stopped this morning.    Electrolytes: Potassium and magnesium replaced per protocol.  Pain: Denied.  Tests/procedures: None performed during shift.    Mobility: Ambulated in room independently with steady gait.  Verbalized readiness for discharge, wife here to drive patient home.    Plan: Discharge patient.  Ensure patient and wife understand follow up cares and appointments.  Ensure patient and wife understand signs/symptoms that indicate need for further care.

## 2023-03-28 NOTE — PHARMACY-ANTICOAGULATION SERVICE
Clinical Pharmacy- Warfarin Discharge Note  This patient is currently on warfarin for the treatment of LVAD.  INR Goal= 1.7-2.3  Expected length of therapy indefinite .    Warfarin PTA Regimen: 4 mg daily (of note, did require reduced dose of 2 mg on 3/13 and 3/14 for elevated INR)      Anticoagulation Dose History     Recent Dosing and Labs Latest Ref Rng & Units 3/23/2023 3/23/2023 3/24/2023 3/25/2023 3/26/2023 3/27/2023 3/28/2023    Warfarin 1 mg - - - 2 mg 2 mg - - -    Warfarin 3 mg - - 3 mg - - 3 mg 3 mg -    [id number] 70 - 130 % - - - - - - -    INR 0.85 - 1.15 2.20(H) - 2.40(H) 2.22(H) 2.09(H) 1.97(H) 1.81(H)          Vitamin K doses administered during the last 7 days: none  FFP administered during the last 7 days: none  Recommend discharging the patient on a warfarin regimen of 4.5 once today, followed by 3.5 mg daily until outpatient follow-up on 3/31.     The patient should have an INR checked on Friday 3/31 if able.     Patel HensleyD

## 2023-03-28 NOTE — PROGRESS NOTES
D: Stopped by to see patient. Discussed home diuretic regimen and wound care.  I: Discussed POC and provided support and listened to patient and caregiver's thoughts and concerns.  P: Continue to follow patient and address any questions or concerns patient and or caregiver may have.

## 2023-03-28 NOTE — PROGRESS NOTES
Care Management Discharge Note    Discharge Date: 03/28/2023     Discharge Disposition: Home    Discharge Services: None    Discharge DME: Wound care supplies    Discharge Transportation: family or friend will provide    Education Provided on the Discharge Plan: Yes  Persons Notified of Discharge Plans: Pt, wife, LVAD coordinator, bedside RN  Patient/Family in Agreement with the Plan: Yes    Additional Information:  Per MD, pt discharging home today. ID recommending discharge on oral antibiotics so plan is pull PICC prior to discharge. Pt had been going for daily outpatient IV abx infusions prior to admission and had they had also been doing his LVAD dressing changes. Discussed LVAD dressing change plan since he will no longer need to go in daily for infusions. Pt and wife would prefer to learn the current dressing change (hydrofera blue and mepelix) and do it at home. Updated bedside RN and LVAD coordinator. Bedside RN will plans to teach wife current dressing change and gather at least 1 week of dressing change supplies. Pt's LVAD coordinator, Gladys stated she will update orders for wound care supplies through pt's LVAD wound care supplier.     [facility]   Phone: [phone]   Fax: [phone]   Updated Shasta, pharmacist that pt will no longer need outpatient IV antibiotics and wound cares. Also updated Shasta that plan is for oral diuretics at this time.     CC will continue to monitor patient's medical condition and progress towards discharge.  Carmita Farnsworth RN BSN  6C Unit Care Coordinator  Phone number: [phone]  Pager: [phone]

## 2023-03-28 NOTE — PLAN OF CARE
D: Admitted with fluid overload    PMH: chronic systolic heart failure secondary to NICM now s/p HM 3 on 2/18, moderate CAD, HTN, ABHINAV on CPAP, DM2, CKD Stage III, ANA, VT, Afib w/AVR S/p DCCV, chronic driveline infections     I: Monitored vitals and assessed pt status.   Changed: Bumex gtt stopped at 0730   PRN: Ambien, Robaxin  Tele: NSR/V paced with 1st degree AVB and BBB  O2: room air, home CPAP at night  Mobility: independent     A: A0x4. VSS. MAPs 90-94. LVAD #s WNL. Afebrile. Urinating adequately. LBM 3/28. Good appetite. C/o generalized body cramps. Relieved with Robaxin.     P: Continue to monitor Pt status and report changes to Cards 2. Switch to PO diuretics today. Hopeful discharge to home today.

## 2023-03-28 NOTE — PROGRESS NOTES
DISCHARGE   Discharged to: Home  Via: Automobile  Accompanied by: Wife  Discharge Instructions: principal diagnosis, major findings/procedures, diet, activity, medications, follow up appointments, when to call the MD, and what to watchout for (i.e. s/s of infection, increasing SOB, palpitations, Angina). Pt states understanding of all discharge instructions.   Prescriptions: To be filled at discharge pharmacy per pt's request; scripts sent to pharmacy.  Follow Up Appointments: with PCP in 1 week, with LVAD clinic in 2 weeks, with Infectious Diseases via telehealth on 4/14.  Belongings: All sent with pt. Pt verifies that they are taking all belongings with them.   IV: discontinued.   Telemetry: discontinued.   Pt exhibits understanding of above discharge instructions; all questions answered.  Discharge Paperwork: faxed to discharge planner.

## 2023-03-29 NOTE — PROGRESS NOTES
D:  Called pt to check in post hospitalization.  Pt reports feeling well. Pt reports VAD parameters stable  and weight 181lbs (pt reported missing one dose of Torsemide).  Reviewed medications and answered any questions. Patient reports sleeping great and has no anxiety since being home with LVAD. Patient is able to move around the house and care for himself independently.     Discussed follow up plan per Dr. Cisneros.  Pt will see Dr. Cisneros in [place] every other month and come here to the [facility] every other month as well.  Pt encouraged pt to call to arrange 3 appointments (one every two months).  Pt expected to get CMP, Mg and INR on Friday.  New wound care orders sent to [facility] including [facility]OC recommended hydrofera blue and mepilex.  Explained to pt that [facility] does dot stock the Vashe cleansing solution and he will need to supply this independently.      Encouraged pt to page VAD Coordinator with any issues or questions. Pt verbalizes understanding.

## 2023-03-29 NOTE — PROGRESS NOTES
[facility]    Background: Transitional Care Management program identified per system criteria and reviewed by [facility] team for possible outreach.    Assessment: Upon chart review, [facility] Team member will not proceed with patient outreach related to this episode of Transitional Care Management program due to reason below:    Patient is actively connected with Oncology who is closely following patient. Will not conduct outreach to avoid duplication and confusion for patient.     Plan: Transitional Care Management episode addressed appropriately per reason noted above.      Chuyita   [facility]    *Connected Care Resource Team does NOT follow patient ongoing. Referrals are identified based on internal discharge reports and the outreach is to ensure patient has an understanding of their discharge instructions.

## 2023-03-29 NOTE — TELEPHONE ENCOUNTER
MTM referral from: Transitions of Care (recent hospital discharge or ED visit)    MTM referral outreach attempt #1 on March 29, 2023 at 9:31 AM      Outcome: Patient declined, will route to MTM Pharmacist/Provider as an FYI. Thank you for the referral.     Marjan Salamanca - [facility]

## 2023-03-30 NOTE — PROGRESS NOTES
[facility] - CardioMEMS Reading Review    March 30, 2023, 1025   CardioMems period: 3/20/2023 -  4/19/2023      CardioMEMS reviewed and routed to patient's provider, Laine BARRY NP.     Current diuretic:  Torsemide 100 TID with Hydrochlorothiazide 75mg every Monday and Friday, Spironolactone 12.5mg daily  Current potassium chloride dose:  Potassium 80mEq BID    Symptoms: None  Weights: Today, going back: 181    Changes to plan of care today: None  Post hospital changes: Hydrochlorothiazide scheduled and potassium increased.    Current Threshold parameters: 21 - 24    Today's Waveform:       Readings:         [facility] Date Wedge Pressure MEMS PAD during cath  (average of the 3 values)     Sept 20, 2022 w/MEMS implant     15 mmHg   16 mmHg

## 2023-03-31 NOTE — PROGRESS NOTES
ANTICOAGULATION MANAGEMENT     Eliseo Tanner 67 year old male is on warfarin with subtherapeutic INR result. (Goal INR 1.7-2.3)    Recent labs: (last 7 days)     03/31/23  0000   INR 1.4*       ASSESSMENT       Source(s): Chart Review and Patient/Caregiver Call       Warfarin doses taken: Warfarin taken as instructed    Diet: No new diet changes identified    New illness, injury, or hospitalization: Yes: recent hosp discharge on abx    Medication/supplement changes: cipro and bactrim -x 14 and 21 days- will increase INR    Signs or symptoms of bleeding or clotting: No    Previous INR: Therapeutic last 2(+) visits    Additional findings: pt instructed to let us know when his bactrim is complete- for now it is 21 days but he thinks they may continue it.         PLAN     Recommended plan for no diet, medication or health factor changes affecting INR     Dosing Instructions: Increase your warfarin dose (9.1% change) with next INR in 3 days       Summary  As of 3/31/2023    Full warfarin instructions:  2 mg every Sun, Wed; 4 mg all other days   Next INR check:  4/3/2023             Telephone call with Eliseo who verbalizes understanding and agrees to plan and who agrees to plan and repeated back plan correctly  And my chart msg sent to pt    Patient to recheck with home meter    Education provided:     Please call back if any changes to your diet, medications or how you've been taking warfarin    Goal range and lab monitoring: goal range and significance of current result    Contact [phone] with any changes, questions or concerns.     Plan made with [facility] Pharmacist Tamiko Duenas, RN  Anticoagulation Clinic  3/31/2023    _______________________________________________________________________     Anticoagulation Episode Summary     Current INR goal:  1.7-2.3   TTR:  44.5 % (10.5 mo)   Target end date:  Indefinite   Send INR reminders to:  [facility] LVAD    Indications    LVAD (left ventricular  assist device) present (H) [Z95.811]  Chronic systolic congestive heart failure (H) [I50.22]  Paroxysmal atrial fibrillation (H) [I48.0]           Comments:  Follow VAD Anticoag protocol:Yes: HeartMate 3   Bridging: Call MD each time   Date VAD placed: 2/18/2020 5/6/22 [employer]         Anticoagulation Care Providers     Provider Role Specialty Phone number    Nader Cisneros MD Referring Cardiovascular Disease [phone]

## 2023-03-31 NOTE — PROGRESS NOTES
S-(situation): Writer called patient to check in post new labs and recent hospital discharge.      B-(background): Patient was recently discharged from hospital due to volume overload complicated by driveline infection. Pt obtained outpatient labs as follow up to discharge.     A-(assessment):   Lab results show increased creatinine (2.10 on 3/30/23 vs. 1.48 on 3/28/23), decreased GFR (33 on 3/30 vs. 51 on 3/28), among other abnormal results. Magnesium levels are normal. INR is 1.4.     Patient denies dizziness/lightheadedness, shortness of breath, chest pain. States his weight has been at 181 pounds for the past 3 days.     Denies LVAD issues or alarms. VAD parameters within normal limits, per patient.     R-(recommendations):   Will discuss with Dr. Cisneros and alert patient of any recommendations. Encouraged pt to page VAD Coordinator with any issues or questions. Pt verbalizes understanding.    UPDATE:   As per Dr. Cisneros, we will continue to monitor the patient's lab results and how he is feeling. Patient to obtain another BMP within the week. Called patient to inform him and he verbalized understanding.

## 2023-04-03 NOTE — TELEPHONE ENCOUNTER
ANTICOAGULATION     Eliseo Tanner is overdue for INR check.      Spoke with Bon and scheduled lab appointment on tomorrow    Char Duenas RN

## 2023-04-05 NOTE — PROGRESS NOTES
ANTICOAGULATION MANAGEMENT     Eliseo Tanner 67 year old male is on warfarin with subtherapeutic INR result. (Goal INR 1.7-2.3)    Recent labs: (last 7 days)     04/04/23  1200   INR 1.5*       ASSESSMENT       Source(s): Chart Review and Patient/Caregiver Call       Warfarin doses taken: Warfarin taken as instructed    Diet: No new diet changes identified    New illness, injury, or hospitalization: No    Medication/supplement changes: continues on Bactrim x 21 days since 3/28/23, also on Cipro at baseline    Signs or symptoms of bleeding or clotting: No    Previous INR: Subtherapeutic , slight trend up with a 9% increase last week    Additional findings: None         PLAN     Recommended plan for temporary change(s) affecting INR     Dosing Instructions: Increase your warfarin dose (8% change) with next INR in 6 days       Summary  As of 4/5/2023    Full warfarin instructions:  2 mg every Sun; 4 mg all other days   Next INR check:  4/10/2023             Telephone call with Eliseo who verbalizes understanding and agrees to plan    Patient using outside facility for labs    Education provided:     Goal range and lab monitoring: goal range and significance of current result    Importance of notifying anticoagulation clinic for: if you did not receive dosing instructions on the same day as your labs were checked    Contact [phone] with any changes, questions or concerns.     Plan made with [facility] Pharmacist Tamiko Hawkins, RN  Anticoagulation Clinic  4/5/2023    _______________________________________________________________________     Anticoagulation Episode Summary     Current INR goal:  1.7-2.3   TTR:  43.1 % (10.5 mo)   Target end date:  Indefinite   Send INR reminders to:  ANTICOAG LVAD    Indications    LVAD (left ventricular assist device) present (H) [Z95.811]  Chronic systolic congestive heart failure (H) [I50.22]  Paroxysmal atrial fibrillation (H) [I48.0]           Comments:  Follow VAD  Anticoag protocol:Yes: HeartMate 3   Bridging: Call MD each time   Date VAD placed: 2/18/2020 5/6/22 [employer]         Anticoagulation Care Providers     Provider Role Specialty Phone number    Nader Cisneros MD Referring Cardiovascular Disease [phone]

## 2023-04-06 NOTE — PROGRESS NOTES
S-(situation): Writer reached out to pt as follow up to recent lab results.     B-(background): Patient obtained lab results last week and Dr. Cisneros wanted him to obtain another BMP this week. Lab collected 4/4/23 and results are in.     A-(assessment):   Pt feeling well. Denies shortness of breath, dizziness, lightheheadedness, chest pain, voiding issues. Denies dark color urine. Denies any dark/red stools or vomit.       Weights:   4/6/23 - 184 #  4/5/23 - 186 #  4/4/23 - 182 #  4/3/23- 182 #    Driveline exit site has more yellow drainage. Patient is already on chronic antibiotics (CIPRO and Bactrim). Patient has sent message to Dr. Ayuob with pictures. Writer provided patient and caregiver with the phone number to ID triage. They have agreed to call them.       R-(recommendations): Will share latest lab results and patient assessment with Dr. Cisneros and inform the patient if any changes are suggested. Encouraged pt to page VAD Coordinator with any issues or questions. Pt verbalizes understanding.

## 2023-04-06 NOTE — PROGRESS NOTES
The pt has an BERNARDA which could be due to bactrim. The pt seems to be on bactrim for 1 week and their cr is 2.3. The K is normal however. 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 Therefore, will restart the pt on Dapto IV at 6mg/kg with weekly CK, CBC/CMP. The pt is also on cipro which needs to be adjusted as well given the renal fxn. Reached out to the ID RNs to set up dapto infusions again.

## 2023-04-07 NOTE — PROGRESS NOTES
Orders from Dr. Ayoub:   Pt was on dapto and then got switched to PO bactrim. Can we put him back on dapto at 6mg/kg please. I don't think we did [facility] home infusion in the past and he went to a local center, if I remember correctly.       Pt gets infusion at [facility] (ph and fax are the same!)  Phone: [phone] Sheila pharmD  Fax: [phone]     Spoke with Sheila about the plan, she had two Dapto doses on hand, but may wait to start on Monday so there are no missed doses with holiday and needing to order more Dapto. Faxed orders to facility.     Pt will need long term PICC line placement, he gave option of  [facility]    A couple other facilities that may be closer to his home would be [facility] or [facility], but unknown if they have IR.     Sent Order to get him started for Monday.   Oral script for Cipro already sent. Pt stopped Bactrim DS. He will wait to hear from clinic for PICC line.   Sent sperate message regarding need for EKG as well.

## 2023-04-10 NOTE — PROGRESS NOTES
[facility]ORE Clinic - CardioMEMS Reading Review    April 10, 2023, 1000   CardioMems period: 3/20/2023 -  4/19/2023      CardioMEMS reviewed and routed to patient's provider, Nader Cisneros MD.     Current diuretic:  Torsemide 100 TID with Hydrochlorothiazide 75mg every Monday and Friday, Spironolactone 12.5mg daily  Current potassium chloride dose:  Potassium 80mEq BID    Symptoms: Fatigue and swelling  Weights: Today, going back: 189, 188, 188, 186, 184, 182    Changes to plan of care today: Adding Bumex 5mg IV twice weekly (Tuesday & Saturday) w/ an additional Potassium 40 mEq PO, on those days.  Pt to repeat BMP Friday.    *Orders faxed to [facility] @ [phone]    Current Threshold parameters: 21 - 24    Today's Waveform:       Readings:           [facility] Date Wedge Pressure MEMS PAD during cath  (average of the 3 values)     Sept 20, 2022 w/MEMS implant     15 mmHg   16 mmHg   ,

## 2023-04-10 NOTE — TELEPHONE ENCOUNTER
[facility] Call Center    Phone Message    May a detailed message be left on voicemail: yes     Reason for Call: Other: Per pt was told by RN , Nilsa that she was going to find out where he can get his PICC line put in. Please call pt abck asap. thank you!      Action Taken: Message routed to:  [facility] ID    Travel Screening: Not Applicable

## 2023-04-10 NOTE — LETTER
Mechanical Circulatory Support Program  [address], [facility] [address] [place] [address]  [phone] Office Phone  [phone] Fax Number    Faxed to: [facility]   Fax Number: [phone]      Patient Name: Eliseo Tanner   : 1955   Diagnosis/ICD-10: Heart Failure, unspecified I50.9; LVAD Z95.811   Requesting Physician: Dr. Nader Cisneros   Date of Request: 04/10/23                           Please fax results to [phone]       or email to [email or web address]                              Call [phone] with Questions  ORDER TEST   X Basic Metabolic Panel - Date to draw ~ 23   X Bumex 5 mg IV twice weekly on  and . To start 23                     Signed,      Nader Cisneros MD  Heart Failure, Mechanical Circulatory Support and Transplant Cardiology   of Medicine,  Division of Cardiology, [facility]

## 2023-04-11 NOTE — PROGRESS NOTES
ANTICOAGULATION MANAGEMENT     Eliseo Tanner 67 year old male is on warfarin with therapeutic INR result. (Goal INR 1.7-2.3)    Recent labs: (last 7 days)     04/10/23  0000   INR 2.1*       ASSESSMENT       Source(s): Patient/Caregiver Call       Warfarin doses taken: Warfarin taken as instructed    Diet: No new diet changes identified    New illness, injury, or hospitalization: No    Medication/supplement changes: None noted    Signs or symptoms of bleeding or clotting: No    Previous INR: Subtherapeutic    Additional findings: None         PLAN     Recommended plan for no diet, medication or health factor changes affecting INR     Dosing Instructions: Continue your current warfarin dose with next INR in 1 week       Summary  As of 4/11/2023    Full warfarin instructions:  2 mg every Sun; 4 mg all other days   Next INR check:  4/17/2023             Telephone call with Eliseo who verbalizes understanding and agrees to plan and who agrees to plan and repeated back plan correctly    Patient using outside facility for labs    Education provided:     None required    Plan made per [facility] anticoagulation protocol and per LVAD protocol    Luiza Perkins RN  Anticoagulation Clinic  4/11/2023    _______________________________________________________________________     Anticoagulation Episode Summary     Current INR goal:  1.7-2.3   TTR:  43.2 % (10.5 mo)   Target end date:  Indefinite   Send INR reminders to:  ANTICOAG LVAD    Indications    LVAD (left ventricular assist device) present (H) [Z95.811]  Chronic systolic congestive heart failure (H) [I50.22]  Paroxysmal atrial fibrillation (H) [I48.0]           Comments:  Follow VAD Anticoag protocol:Yes: HeartMate 3   Bridging: Call MD each time   Date VAD placed: 2/18/2020 5/6/22 [employer]         Anticoagulation Care Providers     Provider Role Specialty Phone number    Nader Cisneros MD Referring Cardiovascular Disease [phone]

## 2023-04-11 NOTE — PROGRESS NOTES
Date: 4/10/2023    Time of Call: 1630     [ TORB ] Ordering provider: Dr. Cisneros  Order: Bumex 5mg IV every Tuesday and Saturday with an additional 40mEq Potassium on those days.       Order received by: writer     Follow-up/additional notes: Bumex ordered and faxed to [facility] @ [phone].  First dose tomorrow.  Pt/caregiver informed of changes via phone.    Dr. Cisneros also requesting to initiate prior auth for sub Q lasix (FuroCix).  Paperwork initiated.

## 2023-04-11 NOTE — TELEPHONE ENCOUNTER
Nurse Duran called back from [facility] in [facility] [phone] and reports order for EKG requested by Dr Ayoub can be faxed to her at [phone] and she will make sure this gets taken care of an result will be sent back to Dr Ayoub at [phone]. Order INK signed by Dr Ayoub and faxed to Nurse Duran today 4/11/23.  Wendy Amador RN    Called [facility] [phone] to find out if pt can have PICC placed there and where to fax order to. Order for PICC placement faxed to [phone]. Pt has apt to have PICC placed on Fri 4/14.  Wendy Amador RN    Called pt to let him know PICC placement Apt will be Friday 4/14 at 10:45 just prior to his next apt there.  Wendy Amador RN

## 2023-04-11 NOTE — TELEPHONE ENCOUNTER
Called pt who reports he would like to get the EKG Dr Ayoub is requesting, done at [facility] in [place], [phone]. Called Nurse Renee there and left voice message for her to return this writers call.  Wendy Amador RN

## 2023-04-14 NOTE — PROGRESS NOTES
"Discussed escalating diuretics, frequent hospital admissions and ongoing driveline infection with patient.  Pt reports feeling overwhelmed, and wonders if \"this is all worth it\".  Emotional support and supportive listening offered.  Per Dr. Cisneros, offered palliative care consult to discuss goals of care and emotional support.  Pt agreeable.  Referral placed.  "

## 2023-04-14 NOTE — NURSING NOTE
Is the patient currently in the state of [place]? YES    Visit mode:VIDEO    If the visit is dropped, the patient can be reconnected by: VIDEO VISIT: Text to cell phone: [phone]    Will anyone else be joining the visit? NO      How would you like to obtain your AVS? MyChart    Are changes needed to the allergy or medication list? NO    Reason for visit: Video Visit    ALTON HOPPER

## 2023-04-14 NOTE — LETTER
4/14/2023       RE: Eliseo Tanner  [address] [place] [address]     Dear Colleague,    Thank you for referring your patient, Eliseo Tanner, to the [facility] INFECTIOUS DISEASE CLINIC [place] at [facility]. Please see a copy of my visit note below.    Virtual Visit Details    Type of service:  Video Visit   Video Start Time: 11:43  Video End Time:11:58 AM    Originating Location (pt. Location): Other Local clinic  Distant Location (provider location):  On-site  Platform used for Video Visit: [facility]  LVAD ID clinic      Patient:  Eliseo Tanner, Date of birth 1955, Medical record number [id number]  Date of Visit:  04/14/2023          Assessment and Recommendations:   ID Problem List  Chronic VAD driveline infection   Presented to OSH on 12/7 which grew MSSA and transferred to [facility] on 12/9/22  s/p I&D of fluid collection on 12/11/22; Cx +MSSA (tetra-R). 12/7 outside culture with both MRSA and MSSA  Has been on long courses of daptomycin for MSSA - recent attempt at Bactrim suppression changed back to daptomycin due to increased creatinine  Cultures from 3/2/23 with Pseudomonas as well as MSSA - started on ciprofloxacin  Cultures from 3/24 with cipro resistant pseudomonas but clinically improving so treatment was not changed  History of MSSA bacteremia 11/2020 secondary to MSSA driveline infection  History of pre-op Proteus and Enterococcus bacteremia  History of severe Legionella pneumonia (serogroup 1L) c/b cardiogenic shock, renal failure requiring CRRT, and resp failure requiring intubation, s/p azithromycin   CKD stage 3    Assessment:  Patient with chronic LVAD driveline infection with MSSA and MRSA s/p I&D 12/11/22 with a slowly healing wound. Note that cultures in our system have been MSSA. One culture from outside dated 12/7/22 with both MRSA and MSSA. He has been on daptomycin for most of the time since  that I&D, with an attempt at switching to Bactrim leading to change back to daptomyin on 4/6 due to rising creatinine. He is also on ciprofloxacin for Pseudomonas, with the decision to keep this agent despite resistant Pseudomonas on 3/24/23 in the setting of ongoing clinical improvement and thought that the more resistant Pseudomonas might be more of a colonizing agent. Today, we had a pretty hectic video visit with me mostly speaking with patient's wife as she transported patient through halls of his local clinic. I wasn't able to get a firm grasp on how he is doing clinically and therefore we will not make any changes today and will reschedule appointment to better assess his clinical status.      RECOMMENDATION:  - No change to current plan. Unable to evaluate patient adequately during this video visit that was conducted while patient was having PICC line replaced and on his way to another medical appointment.   - Will reschedule appointment to take place either in person (preferred) or via video appointment at a time when other appointments are not scheduled.    Marlin Blanco MD  Infectious Diseases  Pager [phone]           Interval History:   Patient is hard of hearing. Reports that he has been doing ok. Getting his PICC line replaced currently and then on his way to another appointment.          History of the Infectious Disease lllness:   Eliseo Tanner is a 67 year old male with past medical history significant for CHF from [facility] s/p HM3 and ICD placement (2/18/20), DM, CKD, chronic MSSA driveline infection on PO suppression, history of MSSA bacteremia (11/2020) 2/2 driveline infection, and pre-operative bacteremia (proteus, enterococcus) who was admitted 12/8/22 for increased driveline drainage and heart failure exacerbation. He recently completed course of IV cefazolin 10/21-11/18/22 for increased driveline drainage with MSSA (tetracycline R) on cultures. This improved but did not fully resolve drainage per  patient. He was transitioned to PO Bactrim for suppression on 11/19, renally adjusted to 1 single strength daily, but had increased purulent drainage noted 1 week later. Previous suppressive regimens of cephalexin and cefadroxil with similar breakthrough drainage despite in vitro susceptibilities. He went to a local facility on 12/7/22 where cultures were obtained and grew MRSA and got transferred to [facility] for LVAD infection.      On admission here he got a CT C/A/P with LVAD driveline showing surrounding fluid along tract increased from prior, no organized fluid collection or significant inflammatory changes and trace ascites. He went to the OR on 12/11/22 for I&D. Cultures grew MSSA.  He was started empirically on micafungin x1, vancomycin, and zosyn, then narrowed to cefazolin monotherapy when cultures returned with MSSA (tetracycline resistant). He was subsequently broadened to dapto a few days later given the OSH cultures from 12/7/22 growing MRSA. A wound vac was placed and started on a initial 4 week IV abx therapy with dapto.      He had a virtual ID clinic visit on 11/13/23. He has been on Dapto 6mg/kg since disharge and has been doing well. He has no complaints. He states that his drainage is better by about 75% and the wound is healing but not completely healed. He has no muscle pain, fevers, chills, sob, chest pain, abd pain, n/v/d or dysuria,      He is here again for an ID virtual visit on 1/31/23. He was reached over the video during his wound vac change. The wound is still has drainage but is getting better. The wound is closing up more as well.  Over video I could not appreciate any drainage. The wife and the wound nurse are happy with the progress. The wound measurement 7cm (Length)x 2cm (width)x.03cm (depth). He has no fevers, chills, sob, cough, n/v/d, muscle aches. His last CK on 1/24 is in normal range. No dysuria.       Transplants:  N/A; Coordinator Kay Padilla    Review of Systems:  Remaining  systems reviewed and negative    No outpatient medications have been marked as taking for the 4/14/23 encounter (Virtual Visit) with [facility] LVAD.     Current Facility-Administered Medications for the 4/14/23 encounter (Virtual Visit) with [facility] LVAD   Medication    bumetanide (BUMEX) injection 5 mg       Allergies   Allergen Reactions    Heparin      HIT screen positive 2/14/20, reflex DAVINA negative; however heme recommended treating as if positive  HIT screen negative 2/11/20    Oxycodone Itching and Other (See Comments)    Chlorhexidine Rash              Physical Exam:     There were no vitals taken for this visit.  Wt Readings from Last 4 Encounters:   03/28/23 80.2 kg (176 lb 12.8 oz)   03/23/23 91.6 kg (201 lb 14.4 oz)   03/02/23 87.3 kg (192 lb 6.4 oz)   02/14/23 80.3 kg (177 lb 1.6 oz)       Exam: Limited exam as visit was conducted via [employer]  GENERAL: well-developed, well-nourished, alert, oriented, in no acute distress. Hard of hearing  HEAD: Head is normocephalic, atraumatic   EYES: Eyes have anicteric sclerae.    NEUROLOGIC: Grossly nonfocal.  Unable to assess driveline as patient is double booked for other medical appointments and in transit.              Laboratory Data:       Inflammatory Markers    Recent Labs   Lab Test 12/08/22  1256 08/17/21  0807 02/16/21  2219 02/15/21  1910 02/11/21  0838 12/17/20  0756 12/14/20  0815 11/05/20  0000 09/21/20  1438   SED 49*  --  72* 47*  --   --   --   --   --    CRP  --  4.9 270.0* 270.0* 8.6 8.0 6.1 54.9 6.6       Metabolic Studies    Recent Labs   Lab Test 04/10/23  1004 03/31/23  0926 03/28/23  0508 03/27/23  1719 03/27/23  1427 03/27/23  0528 03/24/23  0111 03/23/23  1735 02/18/22  0845 02/10/22  0730 02/24/21  1855 02/24/21  0442   NA  --   --  139 134*  --  136   < > 134*   < > 138   < > 132*   POTASSIUM  --   --  3.4 3.8   < > 3.3*   < > 3.7   < > 4.2   < > 4.1   CHLORIDE 95*   < > 95* 92*  --  93*   < > 97*   < > 108   < > 99   CO2  --   --  33* 29  --  31*    < > 25   < > 21   < > 25   ANIONGAP  --   --  11 13  --  12   < > 12   < > 9   < > 8   BUN  --   --  37.9* 40.9*  --  38.0*   < > 47.3*   < > 31*   < > 44*   CR  --   --  1.48* 1.49*  --  1.53*   < > 1.49*   < > 1.29*   < > 3.84*   32047 2.10*   < >  --   --   --   --   --   --    < >  --    < >  --    GFRESTIMATED  --   --  51* 50*  --  49*   < > 50*   < > 60*   < > 15*   GLC  --   --  98 139*  --  92   < > 115*   < > 131*   < > 125*   CALOS  --   --  8.4* 8.5*  --  8.5*   < > 8.1*   < > 8.4*   < > 8.0*   PHOS  --   --   --   --   --   --   --  3.3   < >  --   --   --    MAG  --   --  1.9 2.3  --  2.0   < > 2.1   < >  --    < >  --    URIC  --   --   --   --   --   --   --   --   --  4.2  --   --    LACT  --   --   --   --   --   --   --   --   --   --   --  0.7   CKT  --   --   --   --   --  39  --   --    < >  --   --   --     < > = values in this interval not displayed.       Hepatic Studies    Recent Labs   Lab Test 03/24/23  0749 03/23/23  1735 03/23/23  0732 03/02/23  0832   BILITOTAL 0.7  0.6 0.6 0.6 0.9   DBIL 0.32*  --   --   --    ALKPHOS 216* 289* 307* 339*   PROTTOTAL 7.0 7.3 7.6 8.1   ALBUMIN 2.8* 3.1* 3.2* 3.5   * 146* 148* 133*   ALT 85* 89* 92* 88*   LDH  --   --  269* 302*       Hematology Studies   Recent Labs   Lab Test 04/10/23  1004 03/28/23  0508 03/27/23  0528 03/26/23  0631 03/25/23  0910 03/24/23  0749 03/23/23  1735 03/23/23  0732 03/09/21  0510 03/07/21  0543 03/06/21  0430   WBC  --  9.2 9.4 8.7 8.4   < > 12.4* 10.1   < > 4.5 4.4   43892 8.3  --   --   --   --   --   --   --    < >  --   --    ANEU  --   --   --   --   --   --   --   --   --  2.6 2.8   ANEUTAUTO  --   --   --   --   --   --  9.6* 7.5   < >  --   --    ALYM  --   --   --   --   --   --   --   --   --  0.9 0.7*   ALYMPAUTO  --   --   --   --   --   --  0.9 0.9   < >  --   --    GIULIANO  --   --   --   --   --   --   --   --   --  0.7 0.6   AMONOAUTO  --   --   --   --   --   --  1.5* 1.3   < >  --   --    AEOS  --   --    --   --   --   --   --   --   --  0.3 0.2   AEOSAUTO  --   --   --   --   --   --  0.2 0.2   < >  --   --    ABSBASO  --   --   --   --   --   --  0.1 0.1   < >  --   --    HGB  --  12.7* 12.2* 12.7* 11.7*   < > 11.7* 12.1*   < > 7.9* 8.0*   94702 11.5*  --   --   --   --   --   --   --    < >  --   --    HCT  --  40.9 38.6* 40.9 37.8*   < > 36.5* 37.9*   < > 25.9* 25.3*   PLT  --  283 281 299 278   < > 283 273   < > 208 196   95473 258  --   --   --   --   --   --   --    < >  --   --     < > = values in this interval not displayed.       Clotting Studies    Recent Labs   Lab Test 04/10/23  0000 04/04/23  1200 03/31/23  0926 03/31/23  0000 03/25/23  0910 03/24/23  0749   INR 2.1* 1.5* 1.4* 1.4*   < > 2.40*   PTT  --   --   --   --   --  43*    < > = values in this interval not displayed.       Microbiology:    Last Culture results   Culture   Date Value Ref Range Status   03/24/2023 3+ Pseudomonas aeruginosa (A)  Final   03/24/2023 3+ Pseudomonas aeruginosa (A)  Final   03/24/2023 3+ Pseudomonas aeruginosa (A)  Final   03/24/2023 1+ Staphylococcus warneri (A)  Final     Comment:     Susceptibilities not routinely done, refer to antibiogram to view typical susceptibility profiles   03/02/2023 1+ Pseudomonas aeruginosa (A)  Final   03/02/2023 1+ Pseudomonas aeruginosa (A)  Final   03/02/2023 1+ Staphylococcus aureus (A)  Corrected     Comment:     Susceptibility testing requested by Sunni Jimenez pharmacist ([id number]) to check slightly elevated vanco result. 3.8.23 at 1047  Susceptibility testing requested by Sunni Jimenez, pharmacist for dalbavancin.    03/02/2023 1+ Staphylococcus aureus (A)  Final   03/02/2023 No anaerobic organisms isolated  Final   12/11/2022 No anaerobic organisms isolated  Final   12/11/2022 No Growth  Final   12/11/2022 1+ Staphylococcus aureus (A)  Final     Comment:     Susceptibilities done on previous cultures   12/11/2022 1+ Staphylococcus aureus (A)  Final     Comment:     Susceptibilities done on  previous cultures   12/08/2022 No Growth  Final   12/08/2022 2+ Staphylococcus aureus (A)  Final   12/08/2022 2+ Staphylococcus aureus (A)  Final   12/08/2022 No Growth  Final   10/18/2022 2+ Staphylococcus aureus (A)  Final   10/18/2022 2+ Staphylococcus aureus (A)  Final   09/20/2022 2+ Staphylococcus aureus (A)  Final   09/20/2022 1+ Normal freeman  Final   09/20/2022 No anaerobic organisms isolated  Final     Culture Micro   Date Value Ref Range Status   02/17/2021 No growth  Final   02/17/2021 No growth  Final   02/16/2021 Light growth  Enterococcus faecium   (A)  Final   02/16/2021 (A)  Final    Legionella pneumophila  isolated  Sent to [facility] for serotyping     02/16/2021   Final    Critical Value/Significant Value, preliminary result only, called to and read back by  Shobha Pedro RN on 2.23.21 at 1401. bw     02/16/2021 (A)  Final    Report received from the [place] Dept[facility] of Health:  Legionella pneumophila serogroup 1  This test was developed and its performance characteristics determined by the [facility].  It has not been cleared or approved by the U.S. Food and Drug   Administration:21CFR 809.30(e).  The FDA has determined that such clearance is not   necessary.     02/15/2021 Moderate growth  Staphylococcus aureus   (A)  Final   02/15/2021 Moderate growth  Strain 2  Staphylococcus aureus   (A)  Final   02/15/2021 Moderate growth  Strain 3  Staphylococcus aureus   (A)  Final   02/15/2021 Light growth  Normal skin freeman    Final         Last checks of Clostridioides difficile testing  Recent Labs   Lab Test 11/15/20  1445   CDBPCT Negative     Quantiferon testing   Recent Labs   Lab Test 03/23/23  1735 03/23/23  0732 02/13/20  1030 02/12/20  0440 02/08/20  0408   TBRES  --   --   --  Negative Negative   LYMPH 7 9   < > 4.8 17.5    < > = values in this interval not displayed.     Virology:  Coronavirus-19 testing    Recent Labs   Lab Test 03/23/23  1735 02/10/23  1040 02/07/23  2221  12/08/22  1641 09/02/22  1749 02/07/22  1456 01/03/22  1011 08/17/21  1229 03/15/21  1612 03/09/21  1335 01/05/21  1521 11/14/20  2140   [id number]  --   --   --   --   --   --   --   --  Positive  --   --   --    [id number]  --   --   --   --   --   --   --   --  1:100  --   --   --    [id number] Negative Negative Negative Negative   < >  --   --   --   --  NEGATIVE   < > Test received-See reflex to IDDL test SARS CoV2 (COVID-19) Virus RT-PCR  NEGATIVE   [id number]  --   --   --   --   --   --   --   --   --  Nasopharyngeal   < > Nasopharyngeal   [id number]  --   --   --   --   --   --   --   --   --   --   --  Nasopharyngeal   COVIDPCREXT  --   --   --   --   --  Detected*  Detected* POSITIVE*  POSITIVE*  --   --   --   --   --    [id number]  --   --   --   --   --   --   --  Negative  --   --   --   --    [id number]  --   --   --   --   --   --   --  <0.40  --   --   --   --     < > = values in this interval not displayed.       Hepatitis B Testing     Recent Labs   Lab Test 02/28/21  1155 02/12/20  0440 02/08/20  0408   AUSAB 0.00 0.69 1.99   HBCAB  --  Nonreactive Nonreactive   HEPBANG Nonreactive Nonreactive Nonreactive      Hepatitis C Antibody   Date Value Ref Range Status   02/12/2020 Nonreactive NR^Nonreactive Final     Comment:     Assay performance characteristics have not been established for newborns,   infants, and children     02/08/2020 Nonreactive NR^Nonreactive Final     Comment:     Assay performance characteristics have not been established for newborns,   infants, and children         CMV Antibody IgG   Date Value Ref Range Status   02/12/2020 >8.0 (H) 0.0 - 0.8 AI Final     Comment:     Positive  Antibody index (AI) values reflect qualitative changes in antibody   concentration that cannot be directly associated with clinical condition or   disease state.     02/08/2020 7.8 (H) 0.0 - 0.8 AI Final     Comment:     Positive  Antibody index (AI) values reflect qualitative changes in antibody    concentration that cannot be directly associated with clinical condition or   disease state.       Varicella Zoster Virus Antibody IgG   Date Value Ref Range Status   02/12/2020 2.0 (H) 0.0 - 0.8 AI Final     Comment:     Positive, suggests prev. exposure and probable immunity  Antibody index (AI) values reflect qualitative changes in antibody   concentration that cannot be directly associated with clinical condition or   disease state.     02/08/2020 2.4 (H) 0.0 - 0.8 AI Final     Comment:     Positive, suggests prev. exposure and probable immunity  Antibody index (AI) values reflect qualitative changes in antibody   concentration that cannot be directly associated with clinical condition or   disease state.       Toxoplasma Antibody IgG   Date Value Ref Range Status   02/12/2020 <3.0 0.0 - 7.1 IU/mL Final     Comment:     Negative- Absence of detectable Toxoplasma gondii IgG antibodies. A negative   result does not rule out acute infection.  The magnitude of the measured result is not indicative of the amount of   antibody present. The concentrations of anti-Toxoplasma gondii IgG in a given   specimen determined with assays from different manufacturers can vary due to   differences in assay methods and reagent specificity.     02/08/2020 <3.0 0.0 - 7.1 IU/mL Final     Comment:     Negative- Absence of detectable Toxoplasma gondii IgG antibodies. A negative   result does not rule out acute infection.  The magnitude of the measured result is not indicative of the amount of   antibody present. The concentrations of anti-Toxoplasma gondii IgG in a given   specimen determined with assays from different manufacturers can vary due to   differences in assay methods and reagent specificity.         Imaging:  CT C/A/P without contrast 12/8/22:  IMPRESSION:   1. LVAD drive line with surrounding fluid in the along the tract in  the subcutaneous right anterior chest wall increased from previous.  Trace fluid surrounding the  LVAD outflow tract , similar to prior. No  organized fluid collection or significant associated inflammatory  changes.  2. Left inguinal lymphadenopathy and stable prominent mediastinal  lymph nodes, likely reactive  3. Cholelithiasis without evidence of cholecystitis. Mild intrahepatic  biliary ductal dilation.  4. Trace ascites

## 2023-04-14 NOTE — PROGRESS NOTES
Virtual Visit Details    Type of service:  Video Visit   Video Start Time: 11:43  Video End Time:11:58 AM    Originating Location (pt. Location): Other Local clinic  Distant Location (provider location):  On-site  Platform used for Video Visit: [facility]  LVAD ID clinic      Patient:  Eliseo Tanner, Date of birth 1955, Medical record number [id number]  Date of Visit:  04/14/2023          Assessment and Recommendations:   ID Problem List  1. Chronic VAD driveline infection   1. Presented to OSH on 12/7 which grew MSSA and transferred to [facility] on 12/9/22  2. s/p I&D of fluid collection on 12/11/22; Cx +MSSA (tetra-R). 12/7 outside culture with both MRSA and MSSA  3. Has been on long courses of daptomycin for MSSA - recent attempt at Bactrim suppression changed back to daptomycin due to increased creatinine  4. Cultures from 3/2/23 with Pseudomonas as well as MSSA - started on ciprofloxacin  5. Cultures from 3/24 with cipro resistant pseudomonas but clinically improving so treatment was not changed  2. History of MSSA bacteremia 11/2020 secondary to MSSA driveline infection  3. History of pre-op Proteus and Enterococcus bacteremia  4. History of severe Legionella pneumonia (serogroup 1L) c/b cardiogenic shock, renal failure requiring CRRT, and resp failure requiring intubation, s/p azithromycin   5. CKD stage 3    Assessment:  Patient with chronic LVAD driveline infection with MSSA and MRSA s/p I&D 12/11/22 with a slowly healing wound. Note that cultures in our system have been MSSA. One culture from outside dated 12/7/22 with both MRSA and MSSA. He has been on daptomycin for most of the time since that I&D, with an attempt at switching to Bactrim leading to change back to daptomyin on 4/6 due to rising creatinine. He is also on ciprofloxacin for Pseudomonas, with the decision to keep this agent despite resistant Pseudomonas on 3/24/23 in the setting of ongoing clinical  improvement and thought that the more resistant Pseudomonas might be more of a colonizing agent. Today, we had a pretty hectic video visit with me mostly speaking with patient's wife as she transported patient through halls of his local clinic. I wasn't able to get a firm grasp on how he is doing clinically and therefore we will not make any changes today and will reschedule appointment to better assess his clinical status.      RECOMMENDATION:  - No change to current plan. Unable to evaluate patient adequately during this video visit that was conducted while patient was having PICC line replaced and on his way to another medical appointment.   - Will reschedule appointment to take place either in person (preferred) or via video appointment at a time when other appointments are not scheduled.    Marlin Blanco MD  Infectious Diseases  Pager [phone]           Interval History:   Patient is hard of hearing. Reports that he has been doing ok. Getting his PICC line replaced currently and then on his way to another appointment.          History of the Infectious Disease lllness:   Eliseo Tanner is a 67 year old male with past medical history significant for CHF from [facility]IC[facility] s/p HM3 and ICD placement (2/18/20), DM, CKD, chronic MSSA driveline infection on PO suppression, history of MSSA bacteremia (11/2020) 2/2 driveline infection, and pre-operative bacteremia (proteus, enterococcus) who was admitted 12/8/22 for increased driveline drainage and heart failure exacerbation. He recently completed course of IV cefazolin 10/21-11/18/22 for increased driveline drainage with MSSA (tetracycline R) on cultures. This improved but did not fully resolve drainage per patient. He was transitioned to PO Bactrim for suppression on 11/19, renally adjusted to 1 single strength daily, but had increased purulent drainage noted 1 week later. Previous suppressive regimens of cephalexin and cefadroxil with similar breakthrough drainage despite in  vitro susceptibilities. He went to a local facility on 12/7/22 where cultures were obtained and grew MRSA and got transferred to [facility] for LVAD infection.      On admission here he got a CT C/A/P with LVAD driveline showing surrounding fluid along tract increased from prior, no organized fluid collection or significant inflammatory changes and trace ascites. He went to the OR on 12/11/22 for I&D. Cultures grew MSSA.  He was started empirically on micafungin x1, vancomycin, and zosyn, then narrowed to cefazolin monotherapy when cultures returned with MSSA (tetracycline resistant). He was subsequently broadened to dapto a few days later given the OSH cultures from 12/7/22 growing MRSA. A wound vac was placed and started on a initial 4 week IV abx therapy with dapto.      He had a virtual ID clinic visit on 11/13/23. He has been on Dapto 6mg/kg since disharge and has been doing well. He has no complaints. He states that his drainage is better by about 75% and the wound is healing but not completely healed. He has no muscle pain, fevers, chills, sob, chest pain, abd pain, n/v/d or dysuria,      He is here again for an ID virtual visit on 1/31/23. He was reached over the video during his wound vac change. The wound is still has drainage but is getting better. The wound is closing up more as well.  Over video I could not appreciate any drainage. The wife and the wound nurse are happy with the progress. The wound measurement 7cm (Length)x 2cm (width)x.03cm (depth). He has no fevers, chills, sob, cough, n/v/d, muscle aches. His last CK on 1/24 is in normal range. No dysuria.       Transplants:  N/A; Coordinator Kay Padilla    Review of Systems:  Remaining systems reviewed and negative    No outpatient medications have been marked as taking for the 4/14/23 encounter (Virtual Visit) with [facility] LVAD.     Current Facility-Administered Medications for the 4/14/23 encounter (Virtual Visit) with [facility] LVAD   Medication     bumetanide  (BUMEX) injection 5 mg       Allergies   Allergen Reactions     Heparin      HIT screen positive 2/14/20, reflex DAVINA negative; however heme recommended treating as if positive  HIT screen negative 2/11/20     Oxycodone Itching and Other (See Comments)     Chlorhexidine Rash              Physical Exam:     There were no vitals taken for this visit.  Wt Readings from Last 4 Encounters:   03/28/23 80.2 kg (176 lb 12.8 oz)   03/23/23 91.6 kg (201 lb 14.4 oz)   03/02/23 87.3 kg (192 lb 6.4 oz)   02/14/23 80.3 kg (177 lb 1.6 oz)       Exam: Limited exam as visit was conducted via [employer]  GENERAL: well-developed, well-nourished, alert, oriented, in no acute distress. Hard of hearing  HEAD: Head is normocephalic, atraumatic   EYES: Eyes have anicteric sclerae.    NEUROLOGIC: Grossly nonfocal.  Unable to assess driveline as patient is double booked for other medical appointments and in transit.              Laboratory Data:       Inflammatory Markers    Recent Labs   Lab Test 12/08/22  1256 08/17/21  0807 02/16/21  2219 02/15/21  1910 02/11/21  0838 12/17/20  0756 12/14/20  0815 11/05/20  0000 09/21/20  1438   SED 49*  --  72* 47*  --   --   --   --   --    CRP  --  4.9 270.0* 270.0* 8.6 8.0 6.1 54.9 6.6       Metabolic Studies    Recent Labs   Lab Test 04/10/23  1004 03/31/23  0926 03/28/23  0508 03/27/23  1719 03/27/23  1427 03/27/23  0528 03/24/23  0111 03/23/23  1735 02/18/22  0845 02/10/22  0730 02/24/21  1855 02/24/21  0442   NA  --   --  139 134*  --  136   < > 134*   < > 138   < > 132*   POTASSIUM  --   --  3.4 3.8   < > 3.3*   < > 3.7   < > 4.2   < > 4.1   CHLORIDE 95*   < > 95* 92*  --  93*   < > 97*   < > 108   < > 99   CO2  --   --  33* 29  --  31*   < > 25   < > 21   < > 25   ANIONGAP  --   --  11 13  --  12   < > 12   < > 9   < > 8   BUN  --   --  37.9* 40.9*  --  38.0*   < > 47.3*   < > 31*   < > 44*   CR  --   --  1.48* 1.49*  --  1.53*   < > 1.49*   < > 1.29*   < > 3.84*   77659 2.10*   < >  --   --   --    --   --   --    < >  --    < >  --    GFRESTIMATED  --   --  51* 50*  --  49*   < > 50*   < > 60*   < > 15*   GLC  --   --  98 139*  --  92   < > 115*   < > 131*   < > 125*   CALOS  --   --  8.4* 8.5*  --  8.5*   < > 8.1*   < > 8.4*   < > 8.0*   PHOS  --   --   --   --   --   --   --  3.3   < >  --   --   --    MAG  --   --  1.9 2.3  --  2.0   < > 2.1   < >  --    < >  --    URIC  --   --   --   --   --   --   --   --   --  4.2  --   --    LACT  --   --   --   --   --   --   --   --   --   --   --  0.7   CKT  --   --   --   --   --  39  --   --    < >  --   --   --     < > = values in this interval not displayed.       Hepatic Studies    Recent Labs   Lab Test 03/24/23  0749 03/23/23  1735 03/23/23  0732 03/02/23  0832   BILITOTAL 0.7  0.6 0.6 0.6 0.9   DBIL 0.32*  --   --   --    ALKPHOS 216* 289* 307* 339*   PROTTOTAL 7.0 7.3 7.6 8.1   ALBUMIN 2.8* 3.1* 3.2* 3.5   * 146* 148* 133*   ALT 85* 89* 92* 88*   LDH  --   --  269* 302*       Hematology Studies   Recent Labs   Lab Test 04/10/23  1004 03/28/23  0508 03/27/23  0528 03/26/23  0631 03/25/23  0910 03/24/23  0749 03/23/23  1735 03/23/23  0732 03/09/21  0510 03/07/21  0543 03/06/21  0430   WBC  --  9.2 9.4 8.7 8.4   < > 12.4* 10.1   < > 4.5 4.4   73139 8.3  --   --   --   --   --   --   --    < >  --   --    ANEU  --   --   --   --   --   --   --   --   --  2.6 2.8   ANEUTAUTO  --   --   --   --   --   --  9.6* 7.5   < >  --   --    ALYM  --   --   --   --   --   --   --   --   --  0.9 0.7*   ALYMPAUTO  --   --   --   --   --   --  0.9 0.9   < >  --   --    GIULIANO  --   --   --   --   --   --   --   --   --  0.7 0.6   AMONOAUTO  --   --   --   --   --   --  1.5* 1.3   < >  --   --    AEOS  --   --   --   --   --   --   --   --   --  0.3 0.2   AEOSAUTO  --   --   --   --   --   --  0.2 0.2   < >  --   --    ABSBASO  --   --   --   --   --   --  0.1 0.1   < >  --   --    HGB  --  12.7* 12.2* 12.7* 11.7*   < > 11.7* 12.1*   < > 7.9* 8.0*   29460 11.5*  --   --    --   --   --   --   --    < >  --   --    HCT  --  40.9 38.6* 40.9 37.8*   < > 36.5* 37.9*   < > 25.9* 25.3*   PLT  --  283 281 299 278   < > 283 273   < > 208 196   30295 258  --   --   --   --   --   --   --    < >  --   --     < > = values in this interval not displayed.       Clotting Studies    Recent Labs   Lab Test 04/10/23  0000 04/04/23  1200 03/31/23  0926 03/31/23  0000 03/25/23  0910 03/24/23  0749   INR 2.1* 1.5* 1.4* 1.4*   < > 2.40*   PTT  --   --   --   --   --  43*    < > = values in this interval not displayed.       Microbiology:    Last Culture results   Culture   Date Value Ref Range Status   03/24/2023 3+ Pseudomonas aeruginosa (A)  Final   03/24/2023 3+ Pseudomonas aeruginosa (A)  Final   03/24/2023 3+ Pseudomonas aeruginosa (A)  Final   03/24/2023 1+ Staphylococcus warneri (A)  Final     Comment:     Susceptibilities not routinely done, refer to antibiogram to view typical susceptibility profiles   03/02/2023 1+ Pseudomonas aeruginosa (A)  Final   03/02/2023 1+ Pseudomonas aeruginosa (A)  Final   03/02/2023 1+ Staphylococcus aureus (A)  Corrected     Comment:     Susceptibility testing requested by Sunni Jimenez pharmacist ([id number]) to check slightly elevated vanco result. 3.8.23 at 1047  Susceptibility testing requested by Sunni Jimenez, pharmacist for dalbavancin.    03/02/2023 1+ Staphylococcus aureus (A)  Final   03/02/2023 No anaerobic organisms isolated  Final   12/11/2022 No anaerobic organisms isolated  Final   12/11/2022 No Growth  Final   12/11/2022 1+ Staphylococcus aureus (A)  Final     Comment:     Susceptibilities done on previous cultures   12/11/2022 1+ Staphylococcus aureus (A)  Final     Comment:     Susceptibilities done on previous cultures   12/08/2022 No Growth  Final   12/08/2022 2+ Staphylococcus aureus (A)  Final   12/08/2022 2+ Staphylococcus aureus (A)  Final   12/08/2022 No Growth  Final   10/18/2022 2+ Staphylococcus aureus (A)  Final   10/18/2022 2+ Staphylococcus aureus (A)   Final   09/20/2022 2+ Staphylococcus aureus (A)  Final   09/20/2022 1+ Normal freeman  Final   09/20/2022 No anaerobic organisms isolated  Final     Culture Micro   Date Value Ref Range Status   02/17/2021 No growth  Final   02/17/2021 No growth  Final   02/16/2021 Light growth  Enterococcus faecium   (A)  Final   02/16/2021 (A)  Final    Legionella pneumophila  isolated  Sent to [facility] for serotyping     02/16/2021   Final    Critical Value/Significant Value, preliminary result only, called to and read back by  Shobha Pedro RN on 2.23.21 at 1401. bw     02/16/2021 (A)  Final    Report received from the [place] Dept. of Health:  Legionella pneumophila serogroup 1  This test was developed and its performance characteristics determined by the [facility].  It has not been cleared or approved by the U.S. Food and Drug   Administration:21CFR 809.30(e).  The FDA has determined that such clearance is not   necessary.     02/15/2021 Moderate growth  Staphylococcus aureus   (A)  Final   02/15/2021 Moderate growth  Strain 2  Staphylococcus aureus   (A)  Final   02/15/2021 Moderate growth  Strain 3  Staphylococcus aureus   (A)  Final   02/15/2021 Light growth  Normal skin freeman    Final         Last checks of Clostridioides difficile testing  Recent Labs   Lab Test 11/15/20  1445   CDBPCT Negative     Quantiferon testing   Recent Labs   Lab Test 03/23/23  1735 03/23/23  0732 02/13/20  1030 02/12/20  0440 02/08/20  0408   TBRES  --   --   --  Negative Negative   LYMPH 7 9   < > 4.8 17.5    < > = values in this interval not displayed.     Virology:  Coronavirus-19 testing    Recent Labs   Lab Test 03/23/23  1735 02/10/23  1040 02/07/23  2221 12/08/22  1641 09/02/22  1749 02/07/22  1456 01/03/22  1011 08/17/21  1229 03/15/21  1612 03/09/21  1335 01/05/21  1521 11/14/20  2140   [id number]  --   --   --   --   --   --   --   --  Positive  --   --   --    [id number]  --   --   --   --   --   --   --   --  1:100  --   --    --    [id number] Negative Negative Negative Negative   < >  --   --   --   --  NEGATIVE   < > Test received-See reflex to IDDL test SARS CoV2 (COVID-19) Virus RT-PCR  NEGATIVE   [id number]  --   --   --   --   --   --   --   --   --  Nasopharyngeal   < > Nasopharyngeal   [id number]  --   --   --   --   --   --   --   --   --   --   --  Nasopharyngeal   [id number]  --   --   --   --   --  Detected*  Detected* POSITIVE*  POSITIVE*  --   --   --   --   --    [id number]  --   --   --   --   --   --   --  Negative  --   --   --   --    [id number]  --   --   --   --   --   --   --  <0.40  --   --   --   --     < > = values in this interval not displayed.       Hepatitis B Testing     Recent Labs   Lab Test 02/28/21  1155 02/12/20  0440 02/08/20  0408   AUSAB 0.00 0.69 1.99   HBCAB  --  Nonreactive Nonreactive   HEPBANG Nonreactive Nonreactive Nonreactive      Hepatitis C Antibody   Date Value Ref Range Status   02/12/2020 Nonreactive NR^Nonreactive Final     Comment:     Assay performance characteristics have not been established for newborns,   infants, and children     02/08/2020 Nonreactive NR^Nonreactive Final     Comment:     Assay performance characteristics have not been established for newborns,   infants, and children         CMV Antibody IgG   Date Value Ref Range Status   02/12/2020 >8.0 (H) 0.0 - 0.8 AI Final     Comment:     Positive  Antibody index (AI) values reflect qualitative changes in antibody   concentration that cannot be directly associated with clinical condition or   disease state.     02/08/2020 7.8 (H) 0.0 - 0.8 AI Final     Comment:     Positive  Antibody index (AI) values reflect qualitative changes in antibody   concentration that cannot be directly associated with clinical condition or   disease state.       Varicella Zoster Virus Antibody IgG   Date Value Ref Range Status   02/12/2020 2.0 (H) 0.0 - 0.8 AI Final     Comment:     Positive, suggests prev. exposure and probable  immunity  Antibody index (AI) values reflect qualitative changes in antibody   concentration that cannot be directly associated with clinical condition or   disease state.     02/08/2020 2.4 (H) 0.0 - 0.8 AI Final     Comment:     Positive, suggests prev. exposure and probable immunity  Antibody index (AI) values reflect qualitative changes in antibody   concentration that cannot be directly associated with clinical condition or   disease state.       Toxoplasma Antibody IgG   Date Value Ref Range Status   02/12/2020 <3.0 0.0 - 7.1 IU/mL Final     Comment:     Negative- Absence of detectable Toxoplasma gondii IgG antibodies. A negative   result does not rule out acute infection.  The magnitude of the measured result is not indicative of the amount of   antibody present. The concentrations of anti-Toxoplasma gondii IgG in a given   specimen determined with assays from different manufacturers can vary due to   differences in assay methods and reagent specificity.     02/08/2020 <3.0 0.0 - 7.1 IU/mL Final     Comment:     Negative- Absence of detectable Toxoplasma gondii IgG antibodies. A negative   result does not rule out acute infection.  The magnitude of the measured result is not indicative of the amount of   antibody present. The concentrations of anti-Toxoplasma gondii IgG in a given   specimen determined with assays from different manufacturers can vary due to   differences in assay methods and reagent specificity.         Imaging:  CT C/A/P without contrast 12/8/22:  IMPRESSION:   1. LVAD drive line with surrounding fluid in the along the tract in  the subcutaneous right anterior chest wall increased from previous.  Trace fluid surrounding the LVAD outflow tract , similar to prior. No  organized fluid collection or significant associated inflammatory  changes.  2. Left inguinal lymphadenopathy and stable prominent mediastinal  lymph nodes, likely reactive  3. Cholelithiasis without evidence of cholecystitis.  Mild intrahepatic  biliary ductal dilation.  4. Trace ascites

## 2023-04-14 NOTE — PROGRESS NOTES
[facility] - CardioMEMS Reading Review    April 14, 2023, 0930   CardioMems period: 3/20/2023 -  4/19/2023      CardioMEMS reviewed and routed to patient's provider, Dr. Nader Cisneros    Current diuretic:  Torsemide 100 TID with Hydrochlorothiazide 75mg every Monday and Friday, Spironolactone 12.5mg daily.  Bumex IV 5mg twice weekly w/ additional 40 mEq Potassium.  Current potassium chloride dose:  Potassium 80mEq BID    Symptoms: Fatigue  Weights: Today, going back: 189, 188, 184, 186, 184    Changes to plan of care today: Pt seeing Dr. Sykes in [place] [facility]. Per Dr. Cisneros, pt should be admitted for diuresis.  Talked with Yessenia PETERSON with  cardiology team regarding these recommendations.  Pt advised via phone.    Current Threshold parameters: 21 - 24    Today's Waveform:       Readings:         RHC Date Wedge Pressure MEMS PAD during cath  (average of the 3 values)     Sept 20, 2022 w/MEMS implant     15 mmHg   16 mmHg

## 2023-04-17 PROBLEM — E87.70 HYPERVOLEMIA: Status: ACTIVE | Noted: 2023-01-01

## 2023-04-17 NOTE — H&P
Cardiology Inpatient H&P  April 17, 2023    Assessment and Plan:  Eliseo Tanner is a 67 year old male with chronic systolic heart failure secondary to NICM (Stage D, NYHA Class IIIA) now s/p HM 3 on 2/18/2020 moderate CAD, HTN, ABHINAV on CPAP, DM2, CKD Stage III, ANA. 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 He was admitted on 4/17/2023 for volume overload management.        # Chronic SCHF 2/2 NICM s/p HM3 with RV dysfunction    # HTN    # Hypervolemia.     Implanted 2/18/2020 complicated by bleeding. Recent admission in March 2023, requiring diuresis. This time, presented with 12lb weight gain in last week or so, this despite recent introduction of twice a week IV Bumex 5mg. Will continue to diurese aggressively, likely will start Bumex gtt in the AM.    ==>VAD parameters post replacement: 5800 RPM; 5.1 L/min, 4.8 W, PI of 3.1. hematocrit of 41%.    - Continue Hydralazine 100 mg TID.    - Continue amlodipine to 10 mg daily   - Continue PTA spironolactone 25 mg daily    - Diuresis:             > Hold pta torsemide 100 mg TID for now              >continue pta  hydrochlorothiazide 75 mg twice weekly(Mon, Thursday)             > S/p 2 doses of 5mg IV Bumex, As of 3 am had made 650cc/s since midnight, so holding off for now. consider starting bumex gtt in the AM if indicated    -LDH: elevated to 582 <--300 on 4/16, will continue to trend.   -Anticoagulation: Warfarin  Goal 1.7-2.3 for recurrent nose-bleeding. (has hx of HIT, so avoid heparin products)   -Antiplatelet: ASA 81 mg po daily  -Driveline infection with antibiotics as below.     # BERNARDA vs CKD progression        Cr at 2.2 on arrival, has been stably elevated in last month, thought in setting of Bactrim which was discontinued. Possible  component of cardiorenal in the setting of recent decompensated heart failure. Will monitor         - urinalysis with microscopy         - trend renal function and monitor for improvement with improving volume status, otherwise work-up further.         - monitor urine output         - avoid nephrotoxins to the extent possible.     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 Note patient has grown quinolone resistant organisms, but ID previously thought given stable clinical picture that okay to continue on cipro. Low threshold to loop ID back in if decompensates from and infections perspective.       - continue ciprofloxacin 750mg BID       - continue IV daptomycin 6mg q24h     # History of Heparin Induced Thrombocytopenia      - avoid heparin products     # Hypothyroidism      - pta levothyroxine 88 mcg qAM     # GERD     - pta pantoprazole every day     # Mood disorder     - pta Paroxetine 20 mg every day     # Iron deficiency Anemia     - pta ferrous sulfate 325 every day     # BPH    - pta finasteride 5 mg every day    - pta tamsulosin 0.8 mg qPM     # Nutritional Supplementation     - pta multivitamin (renalvite/nephrovite) every day     # T2DM      Most recent A1c 6.3 on 9/2/2022       - monitor       - hypoglycemia protocol       - no home medications,   # Gout      - pta allopurinol      # Muscle Spasms      - pta methocarbamol.     FEN:   PPx: on warfarin therapetic.   Code: full code, confirmed on admission.     Disposition Plan   Expected discharge in 3  days to prior living arrangement once euvolemia attained.      Entered: Leslie Robbins MD 04/18/2023, 5:29 PM       CASE to be formally  staffed in the morning.       Note started by   Leslie Robbins DO  Internal Medicine, PGY-I    Continued by   Niru Burns MD   Internal Medicine - PGY2  Pager #  [phone]  ++++++++++++++++++++++++++++++++++++++++++++++++++++++++++++++++++++++++++++++++++          HPI:   Eliseo Tanner is a 67 year old male with chronic systolic heart failure secondary to NICM (Stage D, NYHA Class IIIA) now s/p HM 3 on 2/18, moderate CAD, HTN, ABHINAV on CPAP, DM2, CKD Stage III, ANA. His HM3 post-op course was complicated by retrosternal hematoma and bleeding in the lungs, RV failure, VT in ICU now on amiodarone, and Afib w/AVR S/p DCCV on 2/28. 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      Of note, patient was admitted to [facility] 2 service 3/23-3/28 for hypervolemia, was diuresed with bumetanide gtt for net -20L, and discharged on PTA torsemide 100 mg TID with new hydrochlorothiazide 75 twice weekly. Most recently, he has also been started on 5mg of IV bumex twice a week given ongoing hypervolemia. Of note, patient has also had chronic drive line infection of his HM3, and is currently on IV Daptomycin 6 mg q24 hours and ciprofloxacin 750mg PO BID.    Upon arrival to the ED today, patient remains vitally stable with labs notable for Cr of 2.24 (appears stably elevated in last 4 weeks) though most recent baseline is 1.5-1.8., bicarb of 20, Na of 130, proBNP of 2572 , troponin of 104,  (300 on 4/16). In the ED, technical display problems on his LVAD led to replacement of controller and modular cable with VAD parameters post replacement: 5800 RPM; 5.1 L/min, 4.8 W, PI of 3.1. hematocrit of 41%.       At the time of interview, Patient is lying in his ED room, denies any immediate distress, was breathing comfortably on room air and saturating appropriately. He denies any LVAD alarms, fevers, chills, chest pain, shortness of breath, nausea, vomiting, abdominal pain,  headaches, hematuria, hematochezia, melena, dizziness or unilateral numbness. He endorses abdominal bloating, bilateral lower extremity edema that has worsened (generally for him R>L) and a 12 lb weight gain in the past week per patient and baseline epistaxis intermittently. He lives with his wife Jordan, their adopted granddaughter, two dogs and a cat. Denies smoking,       Review of Systems:    Complete review of systems was performed and negative except per HPI.    PMH:  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)     MSSA     Legionella pneumonia (H)      LVAD (left ventricular assist device) present (H)      MSSA bacteremia 11/2020     Active Problems:  Patient Active Problem List    Diagnosis Date Noted     Hypervolemia 04/17/2023     Priority: Medium     Acute on chronic congestive heart failure, unspecified heart failure type (H) 02/07/2023     Priority: Medium     Hypervolemia, unspecified hypervolemia type 09/02/2022     Priority: Medium     Iron deficiency anemia, unspecified iron deficiency anemia type 02/15/2022     Priority: Medium     Acute kidney injury (BERNARDA) with acute tubular necrosis (ATN) (H)      Priority: Medium     Tachyarrhythmia 02/15/2021     Priority: Medium     Dizziness 01/05/2021     Priority: Medium     Syncope 01/05/2021     Priority: Medium     Wound infection 12/18/2020     Priority: Medium     Added automatically from request for surgery [id number]       ACC/AHA stage D heart failure (H) 12/18/2020     Priority: Medium     Added automatically from request for surgery [id number]       Stage 3b chronic kidney disease (H) 12/18/2020     Priority: Medium     Added automatically from request for surgery [id number]       Mixed hyperlipidemia 12/18/2020     Priority: Medium     Added automatically  from request for surgery [id number]       Benign essential hypertension 12/18/2020     Priority: Medium     Added automatically from request for surgery [id number]       Paroxysmal atrial fibrillation (H) 11/17/2020     Priority: Medium     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)      Priority: Medium     Bacteremia 11/14/2020     Priority: Medium     CKD (chronic kidney disease) stage 4, GFR 15-29 ml/min (H) 10/30/2020     Priority: Medium     Chronic systolic congestive heart failure (H) 06/24/2020     Priority: Medium     LVAD (left ventricular assist device) present (H) 03/16/2020     Priority: Medium     Heart failure (H) 02/06/2020     Priority: Medium     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) 02/06/2020     Priority: Medium     Added automatically from request for surgery [id number]       Cardiac arrest (H) 02/06/2020     Priority: Medium     Added automatically from request for surgery [id number]       Coronary artery disease involving native coronary artery of native heart without angina pectoris 01/14/2020     Priority: Medium     Nonischemic cardiomyopathy (H) 01/14/2020     Priority: Medium     Acute on chronic systolic congestive heart failure (H) 01/13/2020     Priority: Medium     Type 2 diabetes mellitus with stage 3 chronic kidney disease, without long-term current use of insulin (H) 10/07/2019     Priority: Medium     Non-nephrotic range proteinuria 01/07/2013     Priority: Medium     Anxiety 10/18/2012     Priority: Medium     CKD (chronic kidney disease) stage 3, GFR 30-59 ml/min (H) 10/18/2012     Priority: Medium     Base Cr 1.4-1.6       GERD (gastroesophageal reflux disease) 10/18/2012     Priority: Medium     Gout 10/18/2012     Priority: Medium     Hyperlipidemia with target LDL less than 100 10/18/2012     Priority: Medium     ABHINAV on CPAP 10/18/2012     Priority: Medium     Social History:  Social History     Tobacco Use     Smoking status:  Former     Types: Cigarettes     Quit date: 1994     Years since quittin.0     Smokeless tobacco: Never   Substance Use Topics     Alcohol use: Not Currently     Drug use: Never       Medications:    bumetanide  5 mg Intravenous Once per day on Tue Sat           Physical Exam:  Temp:  [97.9  F (36.6  C)] 97.9  F (36.6  C)  Pulse:  [79] 79  Resp:  [20] 20  SpO2:  [96 %] 96 %  No intake or output data in the 24 hours ending 23 1729  GEN: pleasant, no acute distress  HEENT: no icterus, PERRL, EOMI.   CV: hum of LVAD audible through precordium, and back.   CHEST: clear to ausculation bilaterally, no rales or wheezing, hum of LVAD audible.   ABD: soft, non-tender, distended, non-tympanitic, no organomegaly on palpation.   EXTR: 2+ bilateral lower extremity edema, high as knees. No cyanosis.    NEURO: alert and oriented, 5/5 strength in all 4 extremities,     Diagnostics:  All labs and imaging were reviewed, of note:    All laboratory and imaging data in the past 24 hours reviewed as above.   Results for orders placed or performed during the hospital encounter of 23 (from the past 24 hour(s))   EKG 12-lead, tracing only   Result Value Ref Range    Systolic Blood Pressure  mmHg    Diastolic Blood Pressure  mmHg    Ventricular Rate 94 BPM    Atrial Rate 94 BPM    WY Interval 124 ms    QRS Duration 8 ms     ms    QTc 522 ms    P Axis 108 degrees    R AXIS 10 degrees    T Axis 97 degrees    Interpretation ECG       Sinus rhythm  Pulmonary disease pattern  ST & T wave abnormality, consider lateral ischemia  Prolonged QT  Abnormal ECG  Unconfirmed report - interpretation of this ECG is computer generated - see medical record for final interpretation  Confirmed by - EMERGENCY ROOM, PHYSICIAN ([id number]),  JORDAN DANIELS ([id number]) on 2023 9:38:14 PM     XR Chest 2 Views    Narrative    Chest 2 views    INDICATION: Fluid overload    COMPARISON: Chest CT 3/24/2023. Plain films 3/23/2023.    FINDINGS:  Heart size and shape appear normal. LVAD. Implantable  cardiac defibrillator. Costophrenic angles are reasonably sharp  bilaterally. No suspicious pulmonary opacities.      Impression    IMPRESSION: No radiographically dominant pulmonary edema.    AIME GAY MD         SYSTEM ID:  [id number]   CBC with platelets differential    Narrative    The following orders were created for panel order CBC with platelets differential.  Procedure                               Abnormality         Status                     ---------                               -----------         ------                     CBC with platelets and d...[[id number]]  Abnormal            Final result                 Please view results for these tests on the individual orders.   INR   Result Value Ref Range    INR 2.29 (H) 0.85 - 1.15   Basic metabolic panel   Result Value Ref Range    Sodium 130 (L) 136 - 145 mmol/L    Potassium 4.7 3.4 - 5.3 mmol/L    Chloride 95 (L) 98 - 107 mmol/L    Carbon Dioxide (CO2) 20 (L) 22 - 29 mmol/L    Anion Gap 15 7 - 15 mmol/L    Urea Nitrogen 85.5 (H) 8.0 - 23.0 mg/dL    Creatinine 2.24 (H) 0.67 - 1.17 mg/dL    Calcium 8.4 (L) 8.8 - 10.2 mg/dL    Glucose 138 (H) 70 - 99 mg/dL    GFR Estimate 31 (L) >60 mL/min/1.73m2   Troponin T, High Sensitivity   Result Value Ref Range    Troponin T, High Sensitivity 104 (HH) <=22 ng/L   Nt probnp inpatient (BNP)   Result Value Ref Range    N terminal Pro BNP Inpatient 2,572 (H) 0 - 900 pg/mL   Asymptomatic COVID-19 Virus (Coronavirus) by PCR Nasopharyngeal    Specimen: Nasopharyngeal; Swab   Result Value Ref Range    SARS CoV2 PCR Negative Negative    Narrative    Testing was performed using the Xpert Xpress SARS-CoV-2 Assay on the Cepheid Gene-Xpert Instrument Systems. Additional information about this Emergency Use Authorization (EUA) assay can be found via the Lab Guide. This test should be ordered for the detection of SARS-CoV-2 in individuals who meet SARS-CoV-2  clinical and/or epidemiological criteria as well as from individuals without symptoms or other reasons to suspect COVID-19. Test performance for asymptomatic patients has only been established in anterior nasal swab specimens. This test is for in vitro diagnostic use under the FDA EUA for laboratories certified under CLIA to perform high complexity testing. This test has not been FDA cleared or approved. A negative result does not rule out the presence of PCR inhibitors in the specimen or target RNA concentration below the limit of detection for the assay. The possibility of a false negative should be considered if the patient's recent exposure or clinical presentation suggests COVID-19. This test was validated by [facility]. These Laboratories are certified under the Clinical Laboratory Improvement Amendments (CLIA) as qualified to perform high complexity testing.     CBC with platelets and differential   Result Value Ref Range    WBC Count 12.7 (H) 4.0 - 11.0 10e3/uL    RBC Count 3.80 (L) 4.40 - 5.90 10e6/uL    Hemoglobin 10.9 (L) 13.3 - 17.7 g/dL    Hematocrit 34.3 (L) 40.0 - 53.0 %    MCV 90 78 - 100 fL    MCH 28.7 26.5 - 33.0 pg    MCHC 31.8 31.5 - 36.5 g/dL    RDW 21.1 (H) 10.0 - 15.0 %    Platelet Count 250 150 - 450 10e3/uL    % Neutrophils 77 %    % Lymphocytes 7 %    % Monocytes 12 %    % Eosinophils 2 %    % Basophils 0 %    % Immature Granulocytes 2 %    NRBCs per 100 WBC 0 <1 /100    Absolute Neutrophils 10.0 (H) 1.6 - 8.3 10e3/uL    Absolute Lymphocytes 0.8 0.8 - 5.3 10e3/uL    Absolute Monocytes 1.5 (H) 0.0 - 1.3 10e3/uL    Absolute Eosinophils 0.2 0.0 - 0.7 10e3/uL    Absolute Basophils 0.1 0.0 - 0.2 10e3/uL    Absolute Immature Granulocytes 0.2 <=0.4 10e3/uL    Absolute NRBCs 0.0 10e3/uL   Lactate Dehydrogenase   Result Value Ref Range    Lactate Dehydrogenase 582 (H) 0 - 250 U/L   Basic metabolic panel   Result Value Ref Range    Sodium 130 (L) 136 - 145 mmol/L    Potassium 4.4  3.4 - 5.3 mmol/L    Chloride 95 (L) 98 - 107 mmol/L    Carbon Dioxide (CO2) 19 (L) 22 - 29 mmol/L    Anion Gap 16 (H) 7 - 15 mmol/L    Urea Nitrogen 86.7 (H) 8.0 - 23.0 mg/dL    Creatinine 2.38 (H) 0.67 - 1.17 mg/dL    Calcium 8.2 (L) 8.8 - 10.2 mg/dL    Glucose 120 (H) 70 - 99 mg/dL    GFR Estimate 29 (L) >60 mL/min/1.73m2   Magnesium   Result Value Ref Range    Magnesium 2.3 1.7 - 2.3 mg/dL   Lactic Acid STAT   Result Value Ref Range    Lactic Acid 0.9 0.7 - 2.0 mmol/L   Ionized Calcium   Result Value Ref Range    Calcium Ionized 3.9 (L) 4.4 - 5.2 mg/dL     *Note: Due to a large number of results and/or encounters for the requested time period, some results have not been displayed. A complete set of results can be found in Results Review.       VAD parameters: 5800 RPM; 5.1 L/min, 4.8 W, PI of 3.1. hematocrit of 41%.      I have reviewed today's vital signs, notes, medications, labs and imaging.  I have also seen and examined the patient and agree with the findings and plan as outlined above.  Pt is 68 yo WM well known to our service and briefly is S/P HM3 on 2/18/2020, CAD, HTN, ABHINAV on CPAP, DM2, CKD, ANA. HM3 course complicated by RV failure, VT and chronic driveline infection.  Pt currently treated with daily Daptomycin and ciprofloxacin.  On admission pt is hypervolemic requiring iv diuresis.  Plan discussed with pt.      Daniel Gomes MD, PhD  Professor, Heart Failure and Cardiac Transplantation  [facility]

## 2023-04-17 NOTE — Clinical Note
Prepped: right neck. Prepped with: Betadine. The patient was draped. .Pre-procedure site marking:N/A

## 2023-04-17 NOTE — ED PROVIDER NOTES
[place] EMERGENCY DEPARTMENT ([facility])    4/17/23       ED PROVIDER NOTE   ED 31   History   No chief complaint on file.    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    Of note, patient does have a PICC line in place.  Yesterday nurse checked the dressing applied at home and he was found to have a skin tear that was the cause of the increased drainage from his PICC line site.  The PICC line did have brisk blood return and they were able to flush the line.      Patient is followed by infectious disease clinic and was seen by them on 4/14/2023.  They noted clinical regression of his wound healing with increased drainage.  They recommended continuation of daptomycin 6 mg/kg IV 24 hours given increased amount of drainage, and to start Cipro at 750 mg every 12 hours by mouth to treat the Pseudomonas.    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 [id number] and ;  Surgeon: Dalton Baeza MD;  Location:  HEART CARDIAC CATH LAB     CV CENTRAL VENOUS CATHETER PLACEMENT N/A 02/13/2020    Procedure: Central Venous Catheter Placement;  Surgeon: Chente Moss MD;  Location:  HEART CARDIAC CATH LAB     CV INTRA AORTIC BALLOON N/A 02/07/2020    Procedure: Intra-Aortic Balloon Pump Insertion;  Surgeon: Jose Baldwin MD;  Location:  HEART CARDIAC CATH LAB     CV INTRA AORTIC BALLOON N/A 02/13/2020    Procedure: Intra-Aortic Balloon Pump Insertion;  Surgeon: Chente Moss MD;  Location:  HEART CARDIAC CATH LAB     CV RIGHT HEART CATH MEASUREMENTS RECORDED N/A 09/21/2020    Procedure: CV RIGHT HEART CATH;  Surgeon: Dalton Baeza MD;  Location:  HEART CARDIAC CATH LAB     CV RIGHT HEART CATH MEASUREMENTS RECORDED N/A 01/07/2021    Procedure: Right Heart Cath;  Surgeon: Chun Ball MD;  Location:  HEART CARDIAC CATH LAB     CV RIGHT HEART CATH MEASUREMENTS RECORDED N/A 02/10/2022    Procedure: CV RIGHT HEART CATH;  Surgeon: Dalton Baeza MD;  Location:  HEART CARDIAC CATH LAB     CV RIGHT HEART CATH MEASUREMENTS RECORDED N/A 09/20/2022    Procedure: Right Heart Catheterization [[id number]];  Surgeon: Dalton Baeza MD;  Location:  HEART CARDIAC CATH LAB     CV RIGHT HEART CATH MEASUREMENTS RECORDED N/A 12/12/2022    Procedure: Right Heart Cath;  Surgeon: Chente Moss MD;  Location:  HEART CARDIAC CATH LAB     CV SWAN LUCIANA PROCEDURE N/A 02/13/2020    Procedure: [place] Luc[place] Procedure;  Surgeon: Chente Moss MD;  Location:  HEART CARDIAC CATH LAB     EP ICD Bilateral 03/16/2020    Procedure: EP ICD;  Surgeon: Dali Day MD;  Location:  HEART CARDIAC CATH LAB     INCISION AND DRAINAGE CHEST WASHOUT, COMBINED N/A 12/11/2022    Procedure: INCISION AND DRAINAGE OF DRIVELINE;  Surgeon: Mac Jaramillo,  MD;  Location: [facility] OR     INSERT VENTRICULAR ASSIST DEVICE LEFT (HEARTMATE II) N/A 02/18/2020    Procedure: INSERTION, LEFT VENTRICULAR ASSIST DEVICE (HEARTMATE III);  Surgeon: Mac Jaramillo MD;  Location: [facility] OR     IR CVC TUNNEL PLACEMENT > 5 YRS OF AGE  02/23/2021     IR CVC TUNNEL REMOVAL RIGHT  03/16/2021     MIDLINE INSERTION - DOUBLE LUMEN Left 12/15/2022    left basilic 5 fr dl midline 20 cm     THORACOSCOPY Right 03/06/2020    Procedure: RIGHT VIDEO-ASSISTED THORASCOPIC SURGERY, EVACUATION OF HEMOTHORAX, PLACEMENT OF CHEST TUBES;  Surgeon: William Gan MD;  Location: [facility] OR     allopurinol (ZYLOPRIM) 100 MG tablet  amiodarone (PACERONE) 200 MG tablet  amLODIPine (NORVASC) 5 MG tablet  aspirin (ASA) 81 MG EC tablet  B Complex-C-Folic Acid (RENAL) 1 MG CAPS  ciprofloxacin (CIPRO) 500 MG tablet  ciprofloxacin (CIPRO) 750 MG tablet  co-enzyme Q-10 200 MG CAPS  dapagliflozin (FARXIGA) 5 MG TABS tablet  ferrous sulfate (FEROSUL) 325 (65 Fe) MG tablet  finasteride (PROSCAR) 5 MG tablet  hydrALAZINE (APRESOLINE) 100 MG tablet  hydrochlorothiazide (HYDRODIURIL) 12.5 MG tablet  isosorbide mononitrate (IMDUR) 120 MG 24 HR ER tablet  levothyroxine (SYNTHROID/LEVOTHROID) 88 MCG tablet  magnesium oxide (MAG-OX) 400 MG tablet  methocarbamol (ROBAXIN) 500 MG tablet  omeprazole (PRILOSEC) 20 MG DR capsule  PARoxetine (PAXIL) 10 MG tablet  potassium chloride ER (KLOR-CON M) 20 MEQ CR tablet  senna-docusate (SENOKOT-S/PERICOLACE) 8.6-50 MG tablet  spironolactone (ALDACTONE) 25 MG tablet  sulfamethoxazole-trimethoprim (BACTRIM DS) 800-160 MG tablet  tamsulosin (FLOMAX) 0.4 MG capsule  torsemide (DEMADEX) 100 MG tablet  warfarin ANTICOAGULANT (COUMADIN) 4 MG tablet  zolpidem (AMBIEN) 5 MG tablet      Allergies   Allergen Reactions     Heparin      HIT screen positive 2/14/20, reflex DAVINA negative; however heme recommended treating as if positive  HIT screen negative 2/11/20     Oxycodone Itching and Other (See  "Comments)     Chlorhexidine Rash     Family History  No family history on file.  Social History   Social History     Tobacco Use     Smoking status: Former     Types: Cigarettes     Quit date: 1994     Years since quittin.0     Smokeless tobacco: Never   Substance Use Topics     Alcohol use: Not Currently     Drug use: Never         A medically appropriate review of systems was performed with pertinent positives and negatives noted in the HPI, and all other systems negative.    Physical Exam   Pulse: 79  Temp: 97.9  F (36.6  C)  Resp: 20  Height: 170.2 cm (5' 7\")  SpO2: 96 %  Physical Exam  GEN: Well appearing, non toxic, cooperative  HEENT: normocephalic and atraumatic, PERRLA, EOMI  CV: well-perfused, normal skin color for ethnicity, LVAD hum heard on chest, JVD up to the level of the jaw  PULM: breathing comfortably, in no respiratory distress, clear lung sounds upper and lower lung field, however somewhat difficult to hear over the LVAD  ABD: nondistended  EXT: Full range of motion. 2+ edema bilateral LE to level of the hips  NEURO: awake, conversant, grossly normal bilateral upper and lower extremity strength & ROM   SKIN: No rashes, ecchymosis, or lacerations  PSYCH: Calm and cooperative, interactive    ED Course, Procedures, & Data      Procedures       ED Course Selections:        EKG Interpretation:      Interpreted by Gianna Sanchez MD  Time reviewed: 1456  Symptoms at time of EKG: none  Rhythm: normal sinus   Rate: normal  Axis: normal  Ectopy: none  Conduction: Wide QRS  ST Segments/ T Waves: Pulmonary disease pattern, nonspecific ST changes  Q Waves: none  Comparison to prior: Unchanged    Clinical Impression: normal EKG                     Results for orders placed or performed during the hospital encounter of 23   XR Chest 2 Views     Status: None    Narrative    Chest 2 views    INDICATION: Fluid overload    COMPARISON: Chest CT 3/24/2023. Plain films 3/23/2023.    FINDINGS: Heart size " and shape appear normal. LVAD. Implantable  cardiac defibrillator. Costophrenic angles are reasonably sharp  bilaterally. No suspicious pulmonary opacities.      Impression    IMPRESSION: No radiographically dominant pulmonary edema.    AIME GAY MD         SYSTEM ID:  [id number]   INR     Status: Abnormal   Result Value Ref Range    INR 2.29 (H) 0.85 - 1.15   Basic metabolic panel     Status: Abnormal   Result Value Ref Range    Sodium 130 (L) 136 - 145 mmol/L    Potassium 4.7 3.4 - 5.3 mmol/L    Chloride 95 (L) 98 - 107 mmol/L    Carbon Dioxide (CO2) 20 (L) 22 - 29 mmol/L    Anion Gap 15 7 - 15 mmol/L    Urea Nitrogen 85.5 (H) 8.0 - 23.0 mg/dL    Creatinine 2.24 (H) 0.67 - 1.17 mg/dL    Calcium 8.4 (L) 8.8 - 10.2 mg/dL    Glucose 138 (H) 70 - 99 mg/dL    GFR Estimate 31 (L) >60 mL/min/1.73m2   Troponin T, High Sensitivity     Status: Abnormal   Result Value Ref Range    Troponin T, High Sensitivity 104 (HH) <=22 ng/L   Nt probnp inpatient (BNP)     Status: Abnormal   Result Value Ref Range    N terminal Pro BNP Inpatient 2,572 (H) 0 - 900 pg/mL   CBC with platelets and differential     Status: Abnormal   Result Value Ref Range    WBC Count 12.7 (H) 4.0 - 11.0 10e3/uL    RBC Count 3.80 (L) 4.40 - 5.90 10e6/uL    Hemoglobin 10.9 (L) 13.3 - 17.7 g/dL    Hematocrit 34.3 (L) 40.0 - 53.0 %    MCV 90 78 - 100 fL    MCH 28.7 26.5 - 33.0 pg    MCHC 31.8 31.5 - 36.5 g/dL    RDW 21.1 (H) 10.0 - 15.0 %    Platelet Count 250 150 - 450 10e3/uL    % Neutrophils 77 %    % Lymphocytes 7 %    % Monocytes 12 %    % Eosinophils 2 %    % Basophils 0 %    % Immature Granulocytes 2 %    NRBCs per 100 WBC 0 <1 /100    Absolute Neutrophils 10.0 (H) 1.6 - 8.3 10e3/uL    Absolute Lymphocytes 0.8 0.8 - 5.3 10e3/uL    Absolute Monocytes 1.5 (H) 0.0 - 1.3 10e3/uL    Absolute Eosinophils 0.2 0.0 - 0.7 10e3/uL    Absolute Basophils 0.1 0.0 - 0.2 10e3/uL    Absolute Immature Granulocytes 0.2 <=0.4 10e3/uL    Absolute NRBCs 0.0 10e3/uL    EKG 12-lead, tracing only     Status: None (Preliminary result)   Result Value Ref Range    Systolic Blood Pressure  mmHg    Diastolic Blood Pressure  mmHg    Ventricular Rate 94 BPM    Atrial Rate 94 BPM    MT Interval 124 ms    QRS Duration 8 ms     ms    QTc 522 ms    P Axis 108 degrees    R AXIS 10 degrees    T Axis 97 degrees    Interpretation ECG       Sinus rhythm  Pulmonary disease pattern  ST & T wave abnormality, consider lateral ischemia  Prolonged QT  Abnormal ECG     CBC with platelets differential     Status: Abnormal    Narrative    The following orders were created for panel order CBC with platelets differential.  Procedure                               Abnormality         Status                     ---------                               -----------         ------                     CBC with platelets and d...[[id number]]  Abnormal            Final result                 Please view results for these tests on the individual orders.     Medications   allopurinol (ZYLOPRIM) tablet 200 mg (has no administration in time range)   amiodarone (PACERONE) tablet 200 mg (has no administration in time range)   amLODIPine (NORVASC) tablet 10 mg (has no administration in time range)   aspirin EC tablet 81 mg (has no administration in time range)   multivitamin RENAL (RENAVITE RX/NEPHROVITE) tablet 1 tablet (has no administration in time range)   ciprofloxacin (CIPRO) tablet 500 mg (has no administration in time range)   dapagliflozin (FARXIGA) tablet 5 mg (has no administration in time range)   ferrous sulfate (FEROSUL) tablet 325 mg (has no administration in time range)   finasteride (PROSCAR) tablet 5 mg (has no administration in time range)   hydrALAZINE (APRESOLINE) tablet 100 mg (has no administration in time range)   hydrochlorothiazide (HYDRODIURIL) tablet 75 mg (has no administration in time range)   isosorbide mononitrate (IMDUR) 24 hr tablet 120 mg (has no administration in time range)    levothyroxine (SYNTHROID/LEVOTHROID) tablet 88 mcg (has no administration in time range)   magnesium oxide (MAG-OX) tablet 400 mg (has no administration in time range)   methocarbamol (ROBAXIN) tablet 500 mg (has no administration in time range)   pantoprazole (PROTONIX) EC tablet 40 mg (has no administration in time range)   PARoxetine (PAXIL) tablet 20 mg (has no administration in time range)   senna-docusate (SENOKOT-S/PERICOLACE) 8.6-50 MG per tablet 1 tablet (has no administration in time range)   spironolactone (ALDACTONE) tablet 25 mg (has no administration in time range)   tamsulosin (FLOMAX) capsule 0.8 mg (has no administration in time range)   zolpidem (AMBIEN) tablet 5 mg (has no administration in time range)   DAPTOmycin (CUBICIN) 500 mg in sodium chloride 0.9 % 100 mL intermittent infusion (has no administration in time range)   Warfarin Dose Required Daily - Pharmacist Managed (has no administration in time range)   bumetanide (BUMEX) injection 5 mg (5 mg Intravenous $Given 4/17/23 1605)   DAPTOmycin (CUBICIN) 500 mg in sodium chloride 0.9 % 100 mL intermittent infusion (0 mg Intravenous Stopped 4/17/23 1700)     Labs Ordered and Resulted from Time of ED Arrival to Time of ED Departure   INR - Abnormal       Result Value    INR 2.29 (*)    BASIC METABOLIC PANEL - Abnormal    Sodium 130 (*)     Potassium 4.7      Chloride 95 (*)     Carbon Dioxide (CO2) 20 (*)     Anion Gap 15      Urea Nitrogen 85.5 (*)     Creatinine 2.24 (*)     Calcium 8.4 (*)     Glucose 138 (*)     GFR Estimate 31 (*)    TROPONIN T, HIGH SENSITIVITY - Abnormal    Troponin T, High Sensitivity 104 (*)    NT PROBNP INPATIENT - Abnormal    N terminal Pro BNP Inpatient 2,572 (*)    CBC WITH PLATELETS AND DIFFERENTIAL - Abnormal    WBC Count 12.7 (*)     RBC Count 3.80 (*)     Hemoglobin 10.9 (*)     Hematocrit 34.3 (*)     MCV 90      MCH 28.7      MCHC 31.8      RDW 21.1 (*)     Platelet Count 250      % Neutrophils 77      %  Lymphocytes 7      % Monocytes 12      % Eosinophils 2      % Basophils 0      % Immature Granulocytes 2      NRBCs per 100 WBC 0      Absolute Neutrophils 10.0 (*)     Absolute Lymphocytes 0.8      Absolute Monocytes 1.5 (*)     Absolute Eosinophils 0.2      Absolute Basophils 0.1      Absolute Immature Granulocytes 0.2      Absolute NRBCs 0.0     COVID-19 VIRUS (CORONAVIRUS) BY PCR   LACTATE DEHYDROGENASE     XR Chest 2 Views   Final Result   IMPRESSION: No radiographically dominant pulmonary edema.      AIME GAY MD            SYSTEM ID:  [id number]             Critical care was not performed.     Medical Decision Making  The patient's presentation was of high complexity (a chronic illness severe exacerbation, progression, or side effect of treatment).    The patient's evaluation involved:  review of external note(s) from 3+ sources (see separate area of note for details)  ordering and/or review of 3+ test(s) in this encounter (see separate area of note for details)  review of 3+ test result(s) ordered prior to this encounter (see separate area of note for details)  discussion of management or test interpretation with another health professional (see separate area of note for details)    The patient's management necessitated high risk (a decision regarding hospitalization).      Assessment & Plan    66-year-old male with a history of heart failure status post LVAD placement in 2020, chronic driveline infection currently on daptomycin and ciprofloxacin, with increasing swelling and lower extremity edema as well as abdominal edema over the last week in the setting of 15 pound weight gain here for concern of decompensated heart failure, at the behest of his cardiologist.    From the standpoint of his chronic LVAD line infection, low suspicion at this time for bacteremia.    Primarily concern for decompensated heart failure with fluid overload, versus renal dysfunction in the setting of fluid overload.    I  have reviewed the nursing notes. I have reviewed the findings, diagnosis, plan and need for follow up with the patient.    New Prescriptions    No medications on file       Final diagnoses:   Chronic systolic congestive heart failure (H)       Gianna Sanchez MD  [facility] EMERGENCY DEPARTMENT  4/17/2023     Gianna Sanchez MD  04/17/23 1957

## 2023-04-17 NOTE — Clinical Note
Called to Bettina PETERSON on 6C. All questions answered. All belongings sent with patient. Patient transported to 6C via stretcher with LISSA RN.

## 2023-04-17 NOTE — PROGRESS NOTES
[facility] - CardioMEMS Reading Review    April 17, 2023, 0911   CardioMems period: 3/20/2023 -  4/19/2023    CardioMEMS reviewed and routed to patient's provider, Laine BARRY NP.     Current diuretic dose: Torsemide 100 TID with  Hydrochlorothiazide 75 mg twice weekly (tue, sat), Spironolactone 12.5mg daily, Bumex 5mg IV twice weekly (Mon, Thur)  Current potassium chloride dose:  Potassium 80mEq BID, additional 40mEq Potassium on Bumex days    Symptoms: fatigue  Weights: 182-192    Recent plan of care changes: Added IV bumex and additional Potassium.  Pt being admitted today for diuresis.  Weight 192 today    Current Threshold parameters: 21 - 24    Today's Waveform:   No waveform today. Last reading 4/14    Reading Ranges:         [facility] Date Wedge Pressure MEMS PAD during cath  (average of the 3 values)     Sept 20, 2022 w/MEMS implant     15 mmHg   16 mmHg

## 2023-04-17 NOTE — ED NOTES
Pt roomed and placed on Heart Mate 3 base unit. Pt successfully connected to power, but unable to obtain LVAD numbers from monitor. New base unit obtained and RN encountered the same problem. Pt reports that while he was hospitalized, at a different facility last week, they encountered the same trouble reading his numbers as well. LVAD coordinator Sima contacted and presented to bedside. Third base unit tested and still unable to obtain readings. LVAD coordinator reaching out to  for further instruction. Confirmed patient receiving wall power. LVAD numbers taken directly from patients controller.

## 2023-04-17 NOTE — PROGRESS NOTES
D: ED RN contacted VAD coordinator on call because patient's VAD parameters would not display on his monitor despite the RN placing him on two different power module/monitor and power module/Ipad set ups. The patient was receiving power via the power modules but no data would display.     I/A: Writer went to the bedside in order to assess situation. Writer performed multiple troubleshooting steps including turning the monitor on/off, disconnecting and reconnecting monitor connection, manipulating power cables and driveline, changing the patient to a new power module/ipad equipment set up, etc. Yet the patient data would still not display.     The patient had previously had this issue (see 3/23/23 clinic note by Antonette Correia) and his controller had been replaced then. The issue resolved temporarily and now it was repeating again. Contacted the [employer] for advise.     P: Silva  indicated that both the controller and modular cable should be replaced. Writer suggested replacing modular cable and swapping the back up and primary controllers, but the Silva  declined this suggestion and suggested the controller be replaced along with the modular cable.     Primary controller and modular cable replacement performed at the bedside in the ED. Patient tolerated exchange well. VAD parameters post replacement: 5800 RPM; 5.1 L/min, 4.8 W, PI of 3.1. hematocrit of 41%.      Faulty controller and modular cable will be sent back to [employer] for analysis.     Replaced controller: - [phone]  Replaced modular cable SN: [id number]   New Controller SN: [id number]; (Internal battery SN [id number]; EXP. 11/20/2024)  New Modular Cable SN: [id number]    ED RN, Alondra,  present during exchange.     Patient notified to page on-call coordinator if symptoms worsen or with other concerns. Patient verbalized understanding.

## 2023-04-17 NOTE — ED TRIAGE NOTES
Saw cardiologist on Friday. Cardiologist noted abdominal distention and recommended pt come to get fluid drained. Pt has no complaints at this time.     Triage Assessment     Row Name 04/17/23 1314       Triage Assessment (Adult)    Airway WDL WDL       Respiratory WDL    Respiratory WDL WDL       Skin Circulation/Temperature WDL    Skin Circulation/Temperature WDL WDL       Cardiac WDL    Cardiac WDL WDL       Peripheral/Neurovascular WDL    Peripheral Neurovascular WDL WDL       Cognitive/Neuro/Behavioral WDL    Cognitive/Neuro/Behavioral WDL WDL

## 2023-04-17 NOTE — PROGRESS NOTES
Visited pt and pt's spouse in the ED for admission. Pt appears stable, anticipating admission for volume overload.      Unable to interrogate VAD at this time due to cart not available; reviewed pt's most recent alarms on controller as follows:     -Low voltage alarm x2, noted by pt to be accidental during long drive.  -No external power x2, 3/30 for 34 second and  3/31 for 2 seconds. Pt and spouse do not endorse hearing any alarms. Will continue to investigate and communicate this with pt's primary VAD coordinator.

## 2023-04-17 NOTE — TELEPHONE ENCOUNTER
Last Clinic Visit: 3/23/23   NV: NONE  CARD FYI  90 DAY RF SENT   < CR REVIEWED @LAST APPT  / NEW <  CR (outside lab)

## 2023-04-18 NOTE — PROGRESS NOTES
D: Stopped by to see patient. No VAD related questions or concerns at this time.   I: Discussed POC and provided support and listened to patient and caregiver's thoughts and concerns.  P: Continue to follow patient and address any questions or concerns patient and or caregiver may have.      Patient parameters during visit:  Speed: 5800  Flow: 5.2  PI: 3.4  Power: 4.8

## 2023-04-18 NOTE — CONSULTS
"[facility]  Palliative Care Consultation Note    Patient: Eliseo Tanner  Date of Admission:  4/17/2023    Requesting Clinician / Team: Cardiololy   Reason for consult: Goals of care (\"recurrent admissions for HF, refractory RV failure\")    Recommendations:    Plan for care conference on 4/19 at 2:45pm to discuss goals of care with patient, patient's wife Chapis, primary cardiology team, and palliative care team    Depression / grief reaction with progressive loss of independence and progressive functional decline. Will follow and assess to determine if he would benefit from therapy support and medications.     Patient has found  support in the past to be helpful. Will provide  support during this hospitalization as well     These recommendations have been discussed with primary team.      Thank you for the opportunity to participate in the care of this patient and family. Our team: will continue to follow.     During regular M-F work hours -- if you are not sure who specifically to contact -- please contact us [phone]    After regular work hours and on weekends/holidays, you can call our answering service at [phone]. Also, who's on call for us is available in [employer] Smart Web.     The patient was seen and discussed with staff attending physician, Dr. Thomas.    Maciel Harrell  Internal Medicine and Pediatrics, PGY-4  P: [phone]    Thank you for the opportunity to continue to participate in the care of this patient and family.  Please feel free to contact on-call palliative provider with any emergent needs.  We can be reached via Securely message with the Vocera Web Console (learn more here) or Text page via [facility] Paging/Directory   Physician Attestation:   IChayito MD, saw this patient and agree with Dr. Harrell's findings and plan of care as documented in the note. Total time spent was 80 minutes spent regarding goals of care, plan of care, support on " the date of the encounter.   Chayito Thomas MD / Palliative Medicine       Assessments:  Eliseo Tanner is a 67 year old male with a past medical history significant for chronic systolic heart failure secondary to NICM (Stage D, NYHA Class IIIA) now s/p HM 3 on 2/18/2020 moderate CAD, HTN, ABHINAV on CPAP, DM2, CKD Stage III, ANA. 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 He was admitted on 4/17/2023 for volume overload management.    Notably he has already had 2 prior admissions in 2023 for hypervolemia requiring IV diuresis, with a general decline in renal function over the last 4 months in the outpatient setting from a baseline of 1.5 to an approximate baseline of 2.1 and despite maximizing volume status by giving IV diuretics as an outpatient, he required readmission for fluid overload / worsening heart failure.      Today, the patient was seen for:  - Goals of care  - Nonischemic cardiomyopathy stage D status post LVAD complicated by chronic MSSA and PSA driveline infection  - Hypervolemia refractory to outpatient intermittent IV diuresis  - Moderate coronary disease/hypertension  - ABHINAV on CPAP  - CKD stage III-IV  - Type 2 diabetes  - support    Prognosis, Goals, & Planning:      Functional Status just prior to hospitalization: 3 (Capable of only limited self-care; needs help with ADLs; in bed/chair >50% of waking hours), able to ambulate at most of 1 block with assistance walking cane before limited by fatigue and dyspnea, uses [id number] to explore countryside and visit leg      Prognosis, Goals, and/or Advance Care Planning were addressed today: Yes        Summary/Comments: Started conversation with patient and wife today about goals of care.  Patient and wife engaged in an contemplative phase.  Identified important components of mariaelena in patient's life including time outdoors riding [id number], woodworking,  spending time with grandchildren      Patient's decision making preferences: shared with support from loved ones          Patient has decision-making capacity today for complex decisions: Yes            I have concerns about the patient/family's health literacy today: No, would benefit from further details about long term cardiac prognosis (additional treatments, interventions, devices etc) as well as predicted level of function           Patient has a completed Health Care Directive: No.       Code status: Full Code    Coping, Meaning, & Spirituality:   Mood, coping, and/or meaning in the context of serious illness were addressed today: Yes  Summary/Comments: Patient is identified as Christianity and has previously been seen by tablets in the past finds support useful in prior hospitalization stays.    Social:     Living situation: Lives with wife Chapis    Nagy family / caregivers: Chapis     Family structure: Spouse -Chapis, Bon has 3 children, Chapis has 2 children, together they have 7 grandchildren, 2 grandchildren live near [place]: their ages are 7 and 17.  The 17-year-old they had adopted as their child.     History of Present Illness:  History gathered today from: patient, medical chart    Eliseo Tanner is a 67 year old male with a past medical history significant for chronic systolic heart failure secondary to NICM (Stage D, NYHA Class IIIA) now s/p HM 3 on 2/18/2020 moderate CAD, HTN, ABHINAV on CPAP, DM2, CKD Stage III, ANA. 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 He was admitted on 4/17/2023 for volume overload management.    Notably he has already had 2 prior admissions in 2023 for hypervolemia with >10 lb per wk weight gain requiring IV diuresis, with a general decline in renal function over the last 4 months in the outpatient setting from a baseline of 1.5 to an approximate baseline of 2.1.      He has been seen by  palliative care in the past 2 times, prior to HM3 implantation, and then at post op follow up. At the follow up visit with palliative care, he was feeling that the HM3 was helping him and didn't want to discuss care planning at that time without his wife present. He would want his wife (Jed) to be his decision maker he confirmed at that time. On presentation on 4/17 he remains full code.    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 [id number] and ;  Surgeon: Dalton Baeza MD;  Location:  HEART CARDIAC CATH LAB     CV CENTRAL VENOUS CATHETER PLACEMENT N/A 02/13/2020    Procedure: Central Venous Catheter Placement;  Surgeon: Chente Moss MD;  Location:  HEART CARDIAC CATH LAB     CV INTRA AORTIC BALLOON N/A 02/07/2020    Procedure: Intra-Aortic Balloon Pump Insertion;  Surgeon: Jose Baldwin MD;  Location:  HEART CARDIAC CATH LAB     CV INTRA AORTIC BALLOON  N/A 02/13/2020    Procedure: Intra-Aortic Balloon Pump Insertion;  Surgeon: Chente Moss MD;  Location:  HEART CARDIAC CATH LAB     CV RIGHT HEART CATH MEASUREMENTS RECORDED N/A 09/21/2020    Procedure: CV RIGHT HEART CATH;  Surgeon: Dalton Baeza MD;  Location:  HEART CARDIAC CATH LAB     CV RIGHT HEART CATH MEASUREMENTS RECORDED N/A 01/07/2021    Procedure: Right Heart Cath;  Surgeon: Chun Ball MD;  Location:  HEART CARDIAC CATH LAB     CV RIGHT HEART CATH MEASUREMENTS RECORDED N/A 02/10/2022    Procedure: CV RIGHT HEART CATH;  Surgeon: Dalton Baeza MD;  Location:  HEART CARDIAC CATH LAB     CV RIGHT HEART CATH MEASUREMENTS RECORDED N/A 09/20/2022    Procedure: Right Heart Catheterization [[id number]];  Surgeon: Dalton Baeza MD;  Location:  HEART CARDIAC CATH LAB     CV RIGHT HEART CATH MEASUREMENTS RECORDED N/A 12/12/2022    Procedure: Right Heart Cath;  Surgeon: Chente Moss MD;  Location:  HEART CARDIAC CATH LAB     CV SWAN LUCIANA PROCEDURE N/A 02/13/2020    Procedure: Godwin Luciana Procedure;  Surgeon: Chente Moss MD;  Location:  HEART CARDIAC CATH LAB     EP ICD Bilateral 03/16/2020    Procedure: EP ICD;  Surgeon: Dali Day MD;  Location:  HEART CARDIAC CATH LAB     INCISION AND DRAINAGE CHEST WASHOUT, COMBINED N/A 12/11/2022    Procedure: INCISION AND DRAINAGE OF DRIVELINE;  Surgeon: Mac Jaramillo MD;  Location: [facility] OR     INSERT VENTRICULAR ASSIST DEVICE LEFT (HEARTMATE II) N/A 02/18/2020    Procedure: INSERTION, LEFT VENTRICULAR ASSIST DEVICE (HEARTMATE III);  Surgeon: Mac Jaramillo MD;  Location: [facility] OR     IR CVC TUNNEL PLACEMENT > 5 YRS OF AGE  02/23/2021     IR CVC TUNNEL REMOVAL RIGHT  03/16/2021     MIDLINE INSERTION - DOUBLE LUMEN Left 12/15/2022    left basilic 5 fr dl midline 20 cm     THORACOSCOPY Right 03/06/2020    Procedure: RIGHT VIDEO-ASSISTED THORASCOPIC SURGERY, EVACUATION OF  HEMOTHORAX, PLACEMENT OF CHEST TUBES;  Surgeon: William Gan MD;  Location: [facility] OR         Family History:  No family history on file.      Allergies:  Allergies   Allergen Reactions     Heparin      HIT screen positive 2/14/20, reflex DAVINA negative; however heme recommended treating as if positive  HIT screen negative 2/11/20     Oxycodone Itching and Other (See Comments)     Chlorhexidine Rash        Medications:  I have reviewed this patient's medication profile and medications from this hospitalization.     Noted scheduled meds are:  Not on any chronic medications for pain  Paxil for MDD    Noted PRN meds are:  None    Physical Exam:  Vital Signs: Temp: 98.5  F (36.9  C) Temp src: Oral   Pulse: 105   Resp: 18 SpO2: 100 % O2 Device: None (Room air)    Weight: 197 lbs 12.8 oz  Gen: Adult male lying in bed interactive  Eyes: Conjunctiva clear. Sclera anicteric .  HENT: NCAT; mucous membranes   CV: LVAD, RPMs 5800, wide-complex approximately 90 bpm  Resp: No increased work of breathing, on room air able to speak in full sentences  Abd: Abdominal distention  Msk: no gross deformity  Skin: Diffuse ecchymoses on bilateral forearms  Neuro: A&O x 3; CN II-XII grossly intact  Mental status/Psych: Blunted/sad affect; sensorium intact, fluent - intact speech    Data reviewed:  Reviewed recent labs and pertinent imaging

## 2023-04-18 NOTE — PROGRESS NOTES
Admission          4/17/2023  10:30 PM  -----------------------------------------------------------  Reason for admission: hypervolemia  Primary team notified of pt arrival.  Admitted from: home  Via: stretcher  Belongings: Placed in closet, home meds sent to pharmacy.  Admission Profile: complete  Teaching: orientation to unit and call light- call light within reach, call don't fall, use of console, meal times, when to call for the RN, and enforced importance of safety   Access: R single lumen PICC  Telemetry: Placed on pt  Ht./Wt.: complete  Code Status verified on armband: yes/no  2 RN Skin Assessment Completed By: Maritza RN & Alondra PETERSON  Med Rec completed: yes/no  Bed surface reassessed with algorithm and charted: yes/no  New bed surface ordered: yes/no  Suction/Ambu bag/Flowmeter at bedside: yes/no  Is patient having diarrhea upon admission- if YES fill out testing algorithm : yes/no    C. Diff Testing Algorithm (MUST be marked YES)   1. 3 or more loose stools in 24 hrs. [] Yes [] No       Additional symptoms:(At least ONE must be marked yes)   1. Abdominal pain/discomfort [] Yes [] No   2. Fever at least 38C (100.4 F) [] Yes [] No   3. Elevated WBC(>11,000) [] Yes [] No       Exclusion Criteria:  (MUST be marked YES)   1. Off laxatives for at least 48 hrs. [] Yes [] No       Pt status:    Temp:  [97.9  F (36.6  C)-98.6  F (37  C)] 98  F (36.7  C)  Pulse:  [79-96] 96  Resp:  [18-20] 18  SpO2:  [95 %-100 %] 100 %

## 2023-04-18 NOTE — PROGRESS NOTES
CLINICAL NUTRITION SERVICES    Reason for Assessment:  Consult received for heart failure nutrition education (2 gm Na, 2L fluid restriction, daily weights)    Diet History:  Pt reports being familiar with low Na diet recs; has received multiple times in the past. Was able to correctly verbalize sodium limit (~640 mg Na per meal, meals TID); does not add any salt to foods and prepares most foods from scratch. Shares meal prep/cooking duties with wife. Uses Mrs. Olivia to season foods. Reports compliance to daily weight measures and fluid restriction per HF guidelines. Has no questions r/t low Na diet at this time.     Nutrition Diagnosis:  None     Nutrition Prescription/Recs:    Continue 2 g Na diet.; fluid restriction per provider. Provided with menu listing Na content of our menu items.     Rec avoid or use caution with Mrs. Olivia/potassium-based salt substitutes in setting of CKD. Discussed this with patient.     Interventions:  Nutrition Education  1. Provided verbal instruction on low-sodium meal planning.  2. Provided the following handouts: Heart failure nutrition therapy (AND/NCM), Seasoning Your Foods Without Adding Salt    Goals:    Pt will verbalize at least five high sodium foods and the importance of avoiding added salt to foods for cooking or seasoning foods.     Follow-up:   Patient to ask any further nutrition-related questions before discharge. In addition, pt may request outpatient RD appointment.    Aneesh Gonclaves RDN, LD, [facility]  6B RD pager: [phone]   6B work-room RD phone: *[phone]    Weekend/Holiday RD pager [phone]

## 2023-04-18 NOTE — CONSULTS
Care Management Initial Consult     General Information  Assessment completed with: Patient,    Type of CM/SW Visit: Initial Assessment     Primary Care Provider verified and updated as needed: Yes   Readmission within the last 30 days:        Reason for Consult: discharge planning  Advance Care Planning:             Communication Assessment  Patient's communication style: spoken language (English or Bilingual)    Hearing Difficulty or Deaf: yes   Wear Glasses or Blind: yes     Cognitive  Cognitive/Neuro/Behavioral: WDL                       Living Environment:   People in home: grandchild(arnold), spouse     Current living Arrangements: house      Able to return to prior arrangements: yes        Family/Social Support:  Care provided by: self, spouse/significant other  Provides care for: other (see comments) (granddaughter)  Marital Status:   Wife  Chapis       Description of Support System: Supportive, Involved    Support Assessment: Adequate family and caregiver support, Adequate social supports     Current Resources:   Patient receiving home care services: No     Community Resources: Infusion Services ([facility])  Equipment currently used at home: shower chair, grab bar, toilet, grab bar, tub/shower, raised toilet seat  Supplies currently used at home:       Employment/Financial:  Employment Status: retired        Financial Concerns: No concerns identified            Lifestyle & Psychosocial Needs:  Social Determinants of Health          Tobacco Use: Medium Risk     Smoking Tobacco Use: Former     Smokeless Tobacco Use: Never     Passive Exposure: Not on file   Alcohol Use: Not on file   Financial Resource Strain: Not on file   Food Insecurity: Not on file   Transportation Needs: Not on file   Physical Activity: Not on file   Stress: Not on file   Social Connections: Not on file   Intimate Partner Violence: Not on file   Depression: Not at risk     PHQ-2 Score: 0   Housing Stability: Not on  file         Functional Status:  Prior to admission patient needed assistance:   Dependent ADLs:: Independent, Ambulation-no assistive device  Dependent IADLs:: Independent        Mental Health Status:  Mental Health Status: Grief, adjustment to illness     Chemical Dependency Status:  Chemical Dependency Status: No Current Concerns              Values/Beliefs:  Spiritual, Cultural Beliefs, Synagogue Practices, Values that affect care:                  Additional Information:  Pt known to writer from LVAD program. Pt had LVAD implanted 2/18/2020. Pt admitted 4/17/2023 for acute on chronic heart failure exacerbation. Pt has had several admissions over the past few months for fluid management. After most recent discharge, went home with daily IV abx and 2x/wk IV diuretic at his local clinic. Pt continues with daily IV abx at his local clinic. Pt does not have coverage for home IV abx and this is why he has to do outpatient infusion.      Pt lives with his wife and granddaughter in [facility] [facility]. Pt has great support from his wife. Pt is independent w/ ADLs, IADLs, ambulation and was driving to daily clinic appts. Pt's wife reports that pt has fallen twice in past week at home. She has noticed increased fatigue and frustration in decline in functional status.     Concern from care team that pt's health is declining with frequent admissions, in past few months and despite aggressive outpatient diuretic plan. Palliative Care met w/ pt and wife was on the phone. Spoke to Palliative Care after that visit and reported that pt and wife expressed surprise about visit. Spoke to pt's wife about Palliative Care visit and to provide support. Through tears, she expressed that she did understand why we have asked Palliative Care to become involved, but confided she is not ready to hear what needs to be discussed. Provided supportive listening and normalized how she is feeling. Palliative had discussed with her having a  care conference tomorrow at 2:45 and she is in agreement. Goals for her, at this care conference, are to get information on pt's status, what options are available for better diuresis management in outpatient setting, and plan of care moving forward. Wife reports she feels discussions around prognostication of disease progression might be too much at this first meeting. Wife does tell writer that there are three very important events coming up that pt is very much looking forward too: going to visit his children in [place], [place], 68th birthday celebration in June, and dtr's wedding in September (2023). Understands that at some point we do need to talk about prognosis so that she and pt can make proper plans.     In attendance at tomorrow's care conference at 245 will be pt, pt's wife, Cards 2 LISA, Palliative Care, and LVAD coordinator. Writer has also notified pt's primary cardiologist of care conference.    Writer unable to see pt today, and only was able to talk to his wife. Writer will plan to meet w/ pt tomorrow morning.

## 2023-04-18 NOTE — PLAN OF CARE
Goal Outcome Evaluation:    V/S: VSS, MAPs 70-80 (doppler d/t LVAD), HR 80-90s.  Neuro: Pt is A&O x4, No numbness or tingling, calls appropriately.  Resp: RA, Sats > 95, no c/o SOB. Lung sounds clear.  Cardiac: Tele SR (has LVAD). No c/o chest pain & palpitations. No LVAD alarms.   GI/: BG checks ACHS. Diet advanced to 2g Na diet, no Nausea or vomiting. Voiding adequately via urinal.  Bumex given X1. Last BM 4/17/2023.  Skin: Scattered bruises on L&R forearms. No new deficits noted.  Pain: No c/o pain.  Activity: Independent in room. On contact precautions for MRSA.  Electrolytes: Potassium replaced.  LDAs: Heartmate 3 LVAD.  WOC consulted. R single lumen PICC dressing CDI.     Will continue to monitor.

## 2023-04-18 NOTE — PROGRESS NOTES
V/S: VSS, afebrile. MAPs 70-75 (doppler d/t LVAD), HR 80-90s.  Neuro: Pt is A&O x4, no c/o headache & lightheadedness. No c/o numbness & tingling, calls appropriately.  Resp: RA, CPAP @ hs. Sats > 95, no c/o SOB. Lung sounds clear.  Cardiac: Tele SR (has LVAD). No c/o chest pain & palpitations.  GI/: BG checks ACHS. NPO after 0000, tolerating well. No FR. No c/o n/v/d. Voiding adequately via urinal (given x1 bumex for hypervolemia). Last BM 4/17/2023.  Skin: Scattered bruises on L&R forearms. No new deficits noted.  Pain: No c/o pain.  Activity: Independent in room. On contact precautions for MRSA.  Electrolytes: Potassium replacement needed, recheck in am.  LDAs: Heartmate 3 LVAD WDL, dressing hanged and drive line site CDI. R single lumen PICC dressing CDI.     Will continue to monitor and report changes to team.  Patient currently resting in bed with call light in reach.

## 2023-04-18 NOTE — PHARMACY-ANTICOAGULATION SERVICE
Clinical Pharmacy - Warfarin Dosing Consult     Pharmacy has been consulted to manage this patient s warfarin therapy.  Therapy Goal: INR 2-3 ((had been targeting 1.7-2.3 prior to admission per anticoagulation clinic))  OP Anticoag Clinic: [facility] anticoagulation clinic  Warfarin Prior to Admission: Yes  Warfarin PTA Regimen: 2mg sunday, 4mg all other days (per anticoagulation clinic visit on 4/11/23)  Significant drug interactions: cipro, amiodarone  Recent documented change in oral intake/nutrition: Unknown    INR   Date Value Ref Range Status   04/17/2023 2.29 (H) 0.85 - 1.15 Final     INR (External)   Date Value Ref Range Status   04/10/2023 2.1 (A) 0.9 - 1.1 Final     Chromogenic Factor 10   Date Value Ref Range Status   02/23/2021 31 (L) 70 - 130 % Final     Comment:     Therapeutic Range:  A Chromogenic Factor 10 level of approximately 20-40%   inversely correlates with an INR of 2-3 for patients receiving Warfarin.   Chromogenic Factor 10 levels below 20% indicate an INR greater than 3 and   levels above 40% indicate an INR less than 2.         Recommend warfarin 4 mg today.  Pharmacy will monitor Eliseo Tanner daily and order warfarin doses to achieve specified goal.      Please contact pharmacy as soon as possible if the warfarin needs to be held for a procedure or if the warfarin goals change.      Yeyo Isabel, PharmD, BCPS

## 2023-04-18 NOTE — PROGRESS NOTES
[facility]   Cardiology II Service / Advanced Heart Failure  Daily Progress Note      Patient: Eliseo Tanner  MRN: [id number]  Admission Date: 4/17/2023  Hospital Day # 1    Assessment and Plan: Eliseo Tanner is a 67 year old male with chronic systolic heart failure secondary to NICM (Stage D, NYHA Class IIIA)s/p HM 3 on 2/18/2020 moderate CAD, HTN, ABHINAV on CPAP, DM2, CKD Stage III, ANA. 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 He was admitted on 4/17/2023 for volume overload, recurrent recent admissions for HF.     Today's Plan:  -Bumex 5 mg IV then gtt 2/hr, q12hr lytes  -WOC RN consult  -change INR goal to 1.7-2.3 per outpatient  -iron studies  -palliative care consult (placed as outpatient but hadnt been scheduled yet)    # Acute on chronic BiV systolic heart failure/HFrEF secondary to NICM    # s/p HM3 LVAD as DT on 2/2023  # RV failure  Stage D. NYHA Class IV. Recurrent admissions for HF this year - like 2/2 refractory RV failure    Fluid status: hypervolemic, Bumex 5 mg IV once then start gtt (pta torsemide 100 mg tid with IV Bumex daily as outpatient and hydrochlorothiazide 75 mg twice weekly   ACEi/ARB/ARNi: contraindicated due to renal dysfunction  Afterload reduction: hydralazine 100 mg tid  BB contraindicated due to RV failure  Aldosterone antagonist: pta spironolactone 25 mg daily continued, monitor renal function  SCD prophylaxis ICD  MAP: at goal on above plus amlodipine 10 mg   LDH trends: 345 (582), monitor intermittently  Anticoagulation: warfarin dosing per pharmacy, INR goal 1.7-2.3 due to recurrent epistaxis, today 2.2  Antiplatelet: aspirin 81 mg daily  Speed: 5800 rpm, he is pulsatile at this speed    # BERANRDA vs CKD progression   Cr 2.2 on arrival, stably elevated in  last month, thought in setting of Bactrim which was discontinued. Prior baseline ~1.4. BERNARDA this admission likely in part cardiorenal syndrome.        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 Note: grown quinolone resistant organisms, but ID previously thought given stable clinical picture that okay to continue on cipro. Low threshold to consult ID.      - continue pta ciprofloxacin 750mg BID       - continue pta IV daptomycin 6mg q24h      # History of Heparin Induced Thrombocytopenia   - avoid heparin products      # Hypothyroidism   - pta levothyroxine 88 mcg qAM      # GERD   - pta pantoprazole every day      # Mood disorder   - pta paroxetine 20 mg every day      # Iron deficiency Anemia   - pta ferrous sulfate 325 every day   - repeat iron studies      # BPH   - pta finasteride 5 mg every day   - pta tamsulosin 0.8 mg qPM      # Nutritional Supplementation   - pta multivitamin (renalvite/nephrovite) every day      # T2DM   Most recent A1c 6.3 on 9/2/2022   - monitor   - hypoglycemia protocol     # Gout   - pta allopurinol      # Muscle Spasms   - pta methocarbamol      FEN: 2g Na 2L FR  PROPHY:  Warfarin, PPI  LINES:  PICC chronic  DISPO:  Pending diuresis, like 4-5 days  CODE STATUS:  Full code    Siena Aguiar DNP, NP-C  Advanced Heart Failure/Cardiology II Service  Pager [phone] [facility] [phone]      Patient discussed with Dr. Gomes.        35 minutes spent on the date of the encounter doing chart review, history and exam, documentation and further activities per the note    ================================================================    Subjective/24-Hr Events:   Last 24 hr care team notes reviewed. No overnight events. Feeling ok. Breathing comfortable. Given two doses of IV Bumex without much improvement. No VAD alarms. Good  "appetite.    ROS:  4 point ROS including respiratory, CV, GI and  (other than that noted in the HPI) is negative.     Medications: Reviewed in EPIC.     Physical Exam:   Pulse 96   Temp 98  F (36.7  C) (Oral)   Resp 18   Ht 1.702 m (5' 7\")   Wt 89.7 kg (197 lb 12.8 oz)   SpO2 100%   BMI 30.98 kg/m      GENERAL: Appears comfortable, in no distress .  HEENT: Eye symmetrical, no discharge or icterus bilaterally. Mucous membranes moist and without lesions.  NECK: Supple, JVD at least midneck at 90 degrees.   CV: +mechanical LVAD hum.   RESPIRATORY: Respirations regular, even, and unlabored. Lungs clear.  GI: Soft, and mildly distended with normoactive bowel sounds present in all quadrants. No tenderness, rebound, guarding.   EXTREMITIES: 2+ peripheral edema to thighs, 1+ bilateral radial pulses.   NEUROLOGIC: Alert and oriented x 3. No focal deficits.   MUSCULOSKELETAL: No joint swelling or tenderness.   SKIN: No jaundice. No rashes or lesions.     Labs:  CMP  Recent Labs   Lab 04/18/23  0536 04/18/23  0535 04/17/23  2311 04/17/23  1716   NA  --  132* 130* 130*   POTASSIUM  --  3.5 4.4 4.7   CHLORIDE  --  97* 95* 95*   CO2  --  21* 19* 20*   ANIONGAP  --  14 16* 15   * 132* 120* 138*   BUN  --  86.1* 86.7* 85.5*   CR  --  2.19* 2.38* 2.24*   GFRESTIMATED  --  32* 29* 31*   CALOS  --  7.9* 8.2* 8.4*   MAG  --  2.2 2.3  --        CBC  Recent Labs   Lab 04/18/23  0535 04/17/23  1716   WBC 9.7 12.7*   RBC 3.52* 3.80*   HGB 9.9* 10.9*   HCT 31.6* 34.3*   MCV 90 90   MCH 28.1 28.7   MCHC 31.3* 31.8   RDW 21.1* 21.1*    250       INR  Recent Labs   Lab 04/18/23  0535 04/17/23  1716   INR 2.24* 2.29*                 "

## 2023-04-18 NOTE — PROGRESS NOTES
04/18/23 1000   Appointment Info   Signing Clinician's Name / Credentials (OT) Phan Valderrama, OTR/L   Living Environment   People in Home grandchild(arnold);spouse   Current Living Arrangements house   Home Accessibility stairs to enter home  (converting to a ramp)   Number of Stairs, Main Entrance 3   Stair Railings, Main Entrance railings on both sides of stairs   Transportation Anticipated family or friend will provide;car, drives self   Living Environment Comments Pt has a tub/shower with a tub bench and grab bars.   Self-Care   Usual Activity Tolerance moderate   Current Activity Tolerance fair   Regular Exercise No   Equipment Currently Used at Home grab bar, tub/shower;tub bench   Fall history within last six months yes  (Pt has a walking stick for ambulation.)   Number of times patient has fallen within last six months 1   Activity/Exercise/Self-Care Comment Pt has a scooter for longer distance mobility.   General Information   Onset of Illness/Injury or Date of Surgery 04/17/23   Referring Physician Antonette Aguiar, APRN CNP   Patient/Family Therapy Goal Statement (OT) Pt would like to manage fluid and return home independently.   Additional Occupational Profile Info/Pertinent History of Current Problem Eliseo Tanner is a 67 year old male with a past medical history significant for chronic systolic heart failure secondary to NICM (Stage D, NYHA Class IIIA) now s/p HM 3 on 2/18/2020 moderate CAD, HTN, ABHINAV on CPAP, DM2, CKD Stage III, ANA. 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 He was admitted on 4/17/2023 for volume overload management.   Existing Precautions/Restrictions fall;cardiac   Left Upper Extremity (Weight-bearing Status) full weight-bearing (FWB)   Right Upper Extremity (Weight-bearing Status) full weight-bearing (FWB)   Left Lower Extremity (Weight-bearing Status) full weight-bearing (FWB)   Right Lower  Extremity (Weight-bearing Status) full weight-bearing (FWB)   Cognitive Status Examination   Orientation Status orientation to person, place and time   Visual Perception   Visual Impairment/Limitations WFL;corrective lenses full-time   Sensory   Sensory Quick Adds sensation intact   Pain Assessment   Patient Currently in Pain No   Range of Motion Comprehensive   Comment, General Range of Motion BUE AROM WFL.   Strength Comprehensive (MMT)   Comment, General Manual Muscle Testing (MMT) Assessment BUE strength grossly 4/5 MMT. Mild deficit in  strength present.   Bed Mobility   Comment (Bed Mobility) CGA and vc's.   Transfers   Transfer Comments Min A and vc's   Activities of Daily Living   BADL Assessment/Intervention lower body dressing;grooming;toileting   Lower Body Dressing Assessment/Training   Smith Level (Lower Body Dressing) set up;verbal cues;minimum assist (75% patient effort)   Grooming Assessment/Training   Smith Level (Grooming) set up;verbal cues;minimum assist (75% patient effort)   Toileting   Smith Level (Toileting) set up;verbal cues;minimum assist (75% patient effort)   Clinical Impression   Criteria for Skilled Therapeutic Interventions Met (OT) Yes, treatment indicated   OT Diagnosis Decreased independence and tolerance for ADLs/functional transfers.   OT Problem List-Impairments impacting ADL problems related to;activity tolerance impaired;mobility;range of motion (ROM);strength   Assessment of Occupational Performance 3-5 Performance Deficits   Identified Performance Deficits Decreased independence and tolerance for ADLs/transfers   Planned Therapy Interventions (OT) ADL retraining;IADL retraining;cognition;bed mobility training;ROM;strengthening;stretching;transfer training;home program guidelines;progressive activity/exercise   Clinical Decision Making Complexity (OT) low complexity   Risk & Benefits of therapy have been explained evaluation/treatment results  reviewed;care plan/treatment goals reviewed;patient   OT Total Evaluation Time   OT Eval, Low Complexity Minutes (52782) 10   OT Discharge Planning   OT Plan OT/CR: Functional transfers, Standing ADLS, Functional strength/endurance.   OT Discharge Recommendation (DC Rec) Transitional Care Facility   OT Rationale for DC Rec If pt were to discharge today, pt would likely benefit most from a TCU stay to address strength, endurance and independence with ADLS/transfers. Pt will likely progress to return home with A pending progression.   OT Brief overview of current status Min A and vc's.   Total Session Time   Total Session Time (sum of timed and untimed services) 10

## 2023-04-18 NOTE — PLAN OF CARE
Goal Outcome Evaluation:      Plan of Care Reviewed With: patient    Overall Patient Progress: improvingOverall Patient Progress: improving    Pt A/Ox4 pleasant and cooperative.  Pt denies pain during shift.  Pt up independently in room during shift.  Pt remains on bumex gtt at 2mg /hr, with good urine output during shift.  Pt had dressing change to LVAD done per WOC today, see note and new LVAD orders.  Pacer/ICD check ordered per standing order, appears pt has not had one ordered in past 2 weeks since 3/23, pt also confirmed this date.  VS stable during shift, MAP 76, see flowsheets.

## 2023-04-18 NOTE — PROCEDURES
The patient's HeartMate LVAD was interrogated 4/18/2023  * Speed 5800 rpm   * Pulsatility index 3.2   * Power 4.7 Driver   * Flow 5.1 L/minute   Fluid status: hypervolemic   Alarms were reviewed, and notable for pump stoppage when controller changed.   The driveline exit site was inspected, dressing cdi.   All external components were inspected and showed no evidence of damage or malfunction, none replaced.   No changes to VAD settings made

## 2023-04-18 NOTE — PROGRESS NOTES
Remote monitoring of Pulmonary Artery Pressures via CardioMEMS device reviewed at least weekly and as needed with nursing 3/20 through 4/17). Diuretics adjusted accordingly.   Laine Leon, APRN CNP  4/18/2023

## 2023-04-19 NOTE — PROGRESS NOTES
Care Coordinator  D/I: pt transferred to 6C from 6B.  A: 6B RN CC had sent [facility] a request to check home coverage  P: per email from [facility]:Hi,         Pt Eliseo Tanner does not have coverage for IV Dobutamine in the home. Per our criteria check he does not meet Medicare criteria- Due to no evidence of documented improvement in patient's symptoms of heart failure while on the prescribed inotropic drug in the past. We will have to workup an ABN for the pt to sign if they were to start service with us. Is there a specific drug/dose you d like us to go by? Otherwise, let us know how you would like to proceed.      Thanks,    Benedicto Rojo  Intake/  [facility]  [facility]  [address] [place] [id number]  [email or web address]  [email or web address]  Office: [phone]  Fax: [phone]        Care Conference at 2:45pm--will follow.

## 2023-04-19 NOTE — PROGRESS NOTES
"SPIRITUAL HEALTH SERVICES  SPIRITUAL ASSESSMENT Progress Note (Palliative Focus)  [facility] ([place]) 6C    REFERRAL SOURCE: Palliative care follow up/care conference.    Care conference with patient Eliseo Tanner and wife Jed in person, with children Mayra, Croi, and Antwan present by phone. Cardiology team reviewed condition and potential plans of care. Eliseo understands that the right side of his heart continues to fail, and he names life-extending goals. His 68th birthday is in June and daughter is getting  in September, and he would like to live to see both events, if possible.    Eliseo and Jed are open to discussing health care directive.    Eliseo reports that family are supportive of him, as is their community in town. He is Mormon, and their [employer], [employer], is also supportive. Eliseo said, \"I talk to God all the time,\" and he also finds comfort in listening to Sikh music. He welcomes spiritual support.    Plan: I will follow for spiritual support while Palliative Care is consulted.    Jennifer Rosario M.Div., [facility]  Palliative Care   Pager [phone]  [facility] Inpatient Team Consult pager [phone] (M-F 8-4:30)  After-hours Answering Service [phone]  "

## 2023-04-19 NOTE — PROGRESS NOTES
[facility]   Cardiology II Service / Advanced Heart Failure  Daily Progress Note      Patient: Eliseo Tanner  MRN: [id number]  Admission Date: 4/17/2023  Hospital Day # 2    Assessment and Plan: Eliseo Tanner is a 67 year old male with chronic systolic heart failure secondary to NICM (Stage D, NYHA Class IIIA)s/p HM 3 on 2/18/2020 moderate CAD, HTN, ABHINAV on CPAP, DM2, CKD Stage III, ANA. 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 He was admitted on 4/17/2023 for volume overload, recurrent recent admissions for HF.     Today's Plan:  -Bumex 2/hr, q12hr lytes  -decrease hydralazine to 50 mg tid  -care conference at 2:45p today  -increase Farxiga to 10 mg daily  -CXR to confirm picc position  -will discuss further speed drop given severe RV failure, consider echo when closer to euvolemic    # Acute on chronic BiV systolic heart failure/HFrEF secondary to NICM    # s/p HM3 LVAD as DT on 2/2023  # Severe RV failure  Stage D. NYHA Class IV. Recurrent admissions for HF this year - like 2/2 refractory RV failure. Speed decreased from 6000rpm to 5800 rpm in 2/2023 when LVIdd 3.7cm, no AoV opening, and severe RV dysfunction.      Fluid status: hypervolemic, Bumex 2 mg/hr gtt (pta torsemide 100 mg tid with IV Bumex daily as outpatient and hydrochlorothiazide 75 mg twice weekly   ACEi/ARB/ARNi: contraindicated due to renal dysfunction  Afterload reduction: decrease hydralazine 50 mg tid  BB contraindicated due to RV failure  Aldosterone antagonist: pta spironolactone 25 mg daily continued, monitor renal function  SCD prophylaxis ICD  MAP: at goal on above plus amlodipine 10 mg   LDH trends: 366 <- 345 <- 582, monitor intermittently  Anticoagulation: warfarin dosing per pharmacy, INR goal 1.7-2.3 due to  recurrent epistaxis, today 2.2  Antiplatelet: aspirin 81 mg daily  Speed: 5800 rpm    # BERNARDA on CKD, cardiorenal  Cr 2.2 on arrival, stably elevated in last month, thought in setting of Bactrim which was discontinued. Prior baseline ~1.4. BERNARDA this admission likely in part cardiorenal syndrome. Urinalysis with microscopy bland   - Cr. 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 Note: grown quinolone resistant organisms, but ID previously thought given stable clinical picture that okay to continue on cipro. Low threshold to consult ID.      - continue pta ciprofloxacin 750mg BID       - continue pta IV daptomycin 6mg q24h      # History of Heparin Induced Thrombocytopenia   - avoid heparin products      # Hypothyroidism   - pta levothyroxine 88 mcg qAM      # GERD   - pta pantoprazole every day      # Mood disorder   - pta paroxetine 20 mg every day      # Iron deficiency Anemia   - pta ferrous sulfate 325 every day   - iron studies, sat 11% - IV venofer 200 mg daily x5     # BPH   - pta finasteride 5 mg every day   - pta tamsulosin 0.8 mg qPM      # Nutritional Supplementation   - pta multivitamin (renalvite/nephrovite) every day      # T2DM   Most recent A1c 6.3 on 9/2/2022   - monitor   - hypoglycemia protocol     # Gout   - pta allopurinol      # Muscle Spasms   - pta methocarbamol      FEN: 2g Na 2L FR  PROPHY:  warfarin, PPI  LINES:  PICC   DISPO:  Pending diuresis, likely 4-5 days  CODE STATUS:  Full code    Siena Aguiar DNP, NP-C  Advanced Heart Failure/Cardiology II Service  Pager [phone] [facility] [phone]      Patient discussed with Dr. Gomes.        35 minutes spent on the date of the encounter doing chart review, history and exam, documentation and further activities per the  "note    ================================================================    Subjective/24-Hr Events:   Last 24 hr care team notes reviewed. No overnight events. Weight down. Breathing comfortable. No lightheadedness. A few MAPs in 60s.     ROS:  4 point ROS including respiratory, CV, GI and  (other than that noted in the HPI) is negative.     Medications: Reviewed in EPIC.     Physical Exam:   BP (!) 73/64 (BP Location: Left arm)   Pulse 96   Temp 97.8  F (36.6  C) (Oral)   Resp 18   Ht 1.702 m (5' 7\")   Wt 88.9 kg (195 lb 15.8 oz)   SpO2 96%   BMI 30.70 kg/m      GENERAL: Appears comfortable, in no distress .  HEENT: Eye symmetrical, no discharge or icterus bilaterally. Mucous membranes moist and without lesions.  NECK: Supple, JVD at least midneck at 90 degrees.   CV: +mechanical LVAD hum.   RESPIRATORY: Respirations regular, even, and unlabored. Lungs clear.  GI: Soft, and mildly distended with normoactive bowel sounds present in all quadrants. No tenderness, rebound, guarding.   EXTREMITIES: 2+ peripheral edema to thighs, 1+ bilateral radial pulses.   NEUROLOGIC: Alert and oriented x 3. No focal deficits.   MUSCULOSKELETAL: No joint swelling or tenderness.   SKIN: No jaundice. No rashes or lesions.     Labs:  CMP  Recent Labs   Lab 04/19/23  0718 04/19/23  0457 04/18/23  2147 04/18/23  1759 04/18/23  0536 04/18/23  0535 04/17/23  2311   NA  --  132*  --  132*  --  132* 130*   POTASSIUM  --  3.6  --  4.0  --  3.5 4.4   CHLORIDE  --  99  --  97*  --  97* 95*   CO2  --  22  --  20*  --  21* 19*   ANIONGAP  --  11  --  15  --  14 16*   * 145* 161* 128*   < > 132* 120*   BUN  --  69.6*  --  75.5*  --  86.1* 86.7*   CR  --  1.69*  --  1.89*  --  2.19* 2.38*   GFRESTIMATED  --  43*  --  38*  --  32* 29*   CALOS  --  7.9*  --  8.1*  --  7.9* 8.2*   MAG  --  2.1  --  2.6*  --  2.2 2.3    < > = values in this interval not displayed.       CBC  Recent Labs   Lab 04/19/23  0457 04/18/23  0535 04/17/23  1716 "   WBC 9.0 9.7 12.7*   RBC 3.63* 3.52* 3.80*   HGB 10.1* 9.9* 10.9*   HCT 33.1* 31.6* 34.3*   MCV 91 90 90   MCH 27.8 28.1 28.7   MCHC 30.5* 31.3* 31.8   RDW 21.5* 21.1* 21.1*    205 250       INR  Recent Labs   Lab 04/19/23  0457 04/18/23  0535 04/17/23  1716   INR 2.27* 2.24* 2.29*

## 2023-04-19 NOTE — PROGRESS NOTES
Care Management Follow Up    Length of Stay (days): 2    Expected Discharge Date: 04/21/2023     Concerns to be Addressed:       Patient plan of care discussed at interdisciplinary rounds: Yes    Anticipated Discharge Disposition:  Home vs TCU (current rec)     Anticipated Discharge Services:  Resumption of outpatient IV abx and IV bumex at local hospital   Anticipated Discharge DME:      Patient/family educated on Medicare website which has current facility and service quality ratings:    Education Provided on the Discharge Plan:    Patient/Family in Agreement with the Plan:      Referrals Placed by CM/SW:  none  Private pay costs discussed: Not applicable    Additional Information:  Care conference today for goals of care discussion. In attendance for family was pt's wife, at bedside, and on phone his dtr, Jonny, son, Cyrus, and son Antwan. Care team members included [facility] 2 Provider, Palliative Care Provider and Janak, LVAD coordinator, and writer. Family received medical update and then discussed that pt's right heart is failing and this is reason for fluid retention despite aggressive outpatient plan. Discussed adding further oral medication and potential of an inotrope. At this time pt and wife are clear they want aggressive treatment and willing to do whatever will give pt more time.     Pt does not have HCD and want to complete document. Writer gave wife two blank copies and they plan to review with palliative care provider on Friday.     Discussed discharge planning. Plan for diuresis for several more days. At this time, therapies are recommending TCU. Pt and wife do not feel this will be needed as they feel as the added weight comes off he will feel better. Pt's wife plans to get a ramp installed and pt plans on transitioning to a walker (has one). Will continue to assess discharge needs.       Cintia Perkins, VERÓNICASW

## 2023-04-19 NOTE — PROGRESS NOTES
[facility]  Palliative Care Daily Progress Note       Recommendations & Counseling     Goals of care / Palliative Care encounter:.    Care conference today with the cardiology team, palliative care team, Eliseo and his wife, Jed, in person and various children by phone, Uzma Nunez and Antwan.  Cardiology team reviewed what has been going on with Stewarts heart over the past months and they explained that his heart failure is continuing to worsen and despite using the strongest medicines, his hospitalizations are becoming more and more frequent.  They explained that Stewarts heart failure now also includes the right side of the the heart and unlike the left side of the heart, there was no pump that could be used for the right side.  They explained that dialysis was not an option for Eliseo because he lived so far away from a dialysis/LVAD center and eliseo and Chapis agreed with this. The cardiology team suggested that there were a few other things that they could do to help try and improve Eliseo's heart function and keep him out of the hospital, but they also indicated that they were running out of ways to prolong his life. Eliseo was very clear that he wants everything done to try and prolong his life and he named several events that he is looking forward to (family reunion on his 68th birthday in June and daughter getting  in September).      Eliseo's goals are life-prolonging    Eliseo and Chapis did agree to discuss with their entire family at their next gathering, advance care plans and then eventually fill out an advance directive. I offered to review the long form advanced directive with them both prior to their meeting with the entire family so that Eliseo could begin to think about what is important to him as he moves forward with his progressive heart failure.  We agreed to meet in the coming days to review the advance directive. Eliseo has not previously filled  out an advanced directive.    Palliative care and palliative care  will continue to support Eliseo and his family.           Thank you for the opportunity to continue to participate in the care of this patient and family.  Please feel free to contact on-call palliative provider with any emergent needs.  We can be reached via Securely message with the Vocera Web Console (learn more here)    Chayito Thomas MD / Palliative Medicine     Total time spent was 45 minutes regarding goals of care, plan of care on the date of the encounter.        Assessments          Eliseo Tanner is a 67 year old male with a past medical history significant for chronic systolic heart failure secondary to NICM (Stage D, NYHA Class IIIA) now s/p HM 3 on 2/18/2020 moderate CAD, HTN, ABHINAV on CPAP, DM2, CKD Stage III, ANA. 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 He was admitted on 4/17/2023 for volume overload management. Over the past months, he has had 2 prior admissions in 2023 for hypervolemia requiring IV diuresis, with a general decline in renal function over the last 4 months in the outpatient setting from a baseline of 1.5 to an approximate baseline of 2.1 and despite maximizing volume status by giving IV diuretics as an outpatient, he required readmission for fluid overload / worsening heart failure and progressive functional decline.       Today, the patient was seen for:  - Nonischemic cardiomyopathy stage D status post LVAD complicated by chronic MSSA and PSA driveline infection  - Hypervolemia refractory to outpatient intermittent IV diuresis  - CKD stage III-IV  - goals of care  - support      Prognosis, Goals, or Advance Care Planning was addressed today with: Yes.     Mood, coping, and/or meaning in the context of serious illness were addressed today: Yes.  Eliseo feels very well supported by his family, although family  that is in town as well as family not in the immediate area.  He also feels well supported by his [employer] and his jayden.            Interval History:     Chart review/discussion with unit or clinical team members:   No acute events overnight    Per patient or family/caregivers today:  Eliseo is feeling better than on admission, denies SOB, working with PT which is tiring. He does not want to go to acute rehab on discharge and hopes to be able to do directly home.              Review of Systems:     Besides above, a complete 10+ ROS was reviewed and is unremarkable           Medications:     I have reviewed this patient's medication profile and medications during this hospitalization.             Physical Exam:   Vitals were reviewed  Temp: 98.9  F (37.2  C) Temp src: Oral BP: (!) 60/30 Pulse: 97   Resp: 18 SpO2: 96 % O2 Device: None (Room air)    Gen: Sitting upright in bed, alert, engaged, pleasant, appears comfortable, in no acute distress, sensorium intact             Data Reviewed:     Reviewed recent labs and pertinent imaging  GFR 41, creatinine 1.77, na 130

## 2023-04-19 NOTE — PROGRESS NOTES
Brief Care Coordination Note    Request received to check coverage for home IV inotrope. Referral sent to [facility] at this time to check coverage.     Ana Krueger, RNCC, BSN    [facility]    [address]  [address] [place] [id number]    [email or web address]    Office: [phone] Pager: [phone]

## 2023-04-19 NOTE — PLAN OF CARE
Transfer    Transferred to: 6C  Via: Wheelchair  Reason for transfer: Pt no longer appropriate for 6B  Family: Aware of transfer  Belongings: Packed and sent with pt  Chart: Delivered with pt to next unit  Medications: Meds sent to new unit with pt  Report given to: Sunni   Pt status: A&O x4. SR. RA.

## 2023-04-19 NOTE — PROCEDURES
The patient's HeartMate LVAD was interrogated 4/19/2023  * Speed 5800 rpm   * Pulsatility index 3.5-4.7  * Power 4.7 Driver   * Flow 5.5 L/minute   Fluid status: hypervolemic   Alarms were reviewed, and notable for pump stoppage when controller changed 4/17.   The driveline exit site was inspected, dressing cdi.   All external components were inspected and showed no evidence of damage or malfunction, none replaced.   No changes to VAD settings made

## 2023-04-19 NOTE — PROGRESS NOTES
D: Volume overload management. LVAD Chronic drive line infections    I: Monitored vitals and assessed pt status.   Changed: Pt responding to diuresing well, urine output good. Doppler MAPS 72-78. No LVAD alarms. Pt admitted with outside PICC line, placement not verified since admit. CARDS2 updated, informed RN they will notify day shift.   Running: Bumex 2 mg/hr  PRN: Tylenol for 6/10 joint stiffness    A: Neuro: A&O*4  Tele: NSR, LVAD in place, no alarms. Implanted pacemaker VVIR  Lung: RA clear dime. CPAP @ HS  GI: No BM this shift. Last BM 4/18  : Urinal at edge of bed. good output  Skin: BUE bruising. LVAD site dressed CDI. BLE edema 2+    SBA  AC/HS    I/O this shift:  In: 272 [P.O.:208; I.V.:64]  Out: 850 [Urine:850]    Temp:  [97.5  F (36.4  C)-98.5  F (36.9  C)] 97.8  F (36.6  C)  Pulse:  [] 96  Resp:  [16-18] 18  SpO2:  [96 %-100 %] 98 %      P: Continue Bumex gtt and ABX. Palliative care meeting today at 2.45 pm to discuss goals of care.

## 2023-04-19 NOTE — PHARMACY-ADMISSION MEDICATION HISTORY
Pharmacist Admission Medication History    Admission medication history is complete. The information provided in this note is only as accurate as the sources available at the time of the update.    Medication reconciliation/reorder completed by provider prior to medication history? No    Information Source(s): Hospital records and CareEverywhere/SureScripts via N/A    Pertinent Information:   - INR goal is 1.7-2.3 per anticoagulation clinic note. Current warfarin regimen is 2 mg on Sunday and 4 mg all other days.    Changes made to PTA medication list:    Added: daptomycin    Deleted: Bactrim (switched back to daptomycin)    Changed: updated warfarin dosing    Medication Affordability:       Allergies reviewed with patient and updates made in EHR: unable to assess    Medication History Completed By: Keagan Ramos [facility] 4/19/2023 3:04 PM    Prior to Admission medications    Medication Sig Last Dose Taking? Auth Provider Long Term End Date   allopurinol (ZYLOPRIM) 100 MG tablet Take 2 tablets (200 mg) by mouth daily 4/17/2023 Yes Alberto Garnica MD     amiodarone (PACERONE) 200 MG tablet TAKE 1 TABLET BY MOUTH ONCE DAILY 4/17/2023 Yes Mervat Decker PA-C Yes    amLODIPine (NORVASC) 5 MG tablet TAKE 2 TABLETS BY MOUTH ONCE DAILY 4/17/2023 Yes Mervat Decker PA-C Yes    aspirin (ASA) 81 MG EC tablet Take 1 tablet (81 mg) by mouth daily 4/17/2023 Yes Nader Cisneros MD     B Complex-C-Folic Acid (RENAL) 1 MG CAPS Take 1 capsule by mouth daily 4/17/2023 Yes Nader Cisneros MD     ciprofloxacin (CIPRO) 750 MG tablet Take 1 tablet (750 mg) by mouth 2 times daily 4/17/2023 Yes Christian Philip MD     co-enzyme Q-10 200 MG CAPS Take 200 mg by mouth daily 4/16/2023 Yes Mono Gambino PA-C No    dapagliflozin (FARXIGA) 5 MG TABS tablet Take 5 mg by mouth daily 4/17/2023 Yes Unknown, Entered By History     DAPTOMYCIN IV Inject 500 mg into the vein every 24 hours 4/16/2023 Yes Unknown, Entered By History     ferrous  sulfate (FEROSUL) 325 (65 Fe) MG tablet Take 325 mg by mouth daily (with breakfast) 4/17/2023 Yes Reported, Patient     finasteride (PROSCAR) 5 MG tablet Take 1 tablet (5 mg) by mouth daily Helps with urinary retention. 4/17/2023 Yes Jess Meraz MD     hydrALAZINE (APRESOLINE) 100 MG tablet TAKE 1 TABLET BY MOUTH THREE TIMES DAILY 4/17/2023 Yes Nader Cisneros MD Yes    hydrochlorothiazide (HYDRODIURIL) 12.5 MG tablet Take 6 tablets (75 mg) by mouth twice a week 4/15/2023 Yes Alberto Garnica MD Yes    isosorbide mononitrate (IMDUR) 120 MG 24 HR ER tablet Take 1 tablet (120 mg) by mouth daily for 90 days 4/17/2023 Yes Alexy Gomez MD Yes 5/16/23   levothyroxine (SYNTHROID/LEVOTHROID) 88 MCG tablet Take 1 tablet (88 mcg) by mouth every morning (before breakfast) 4/17/2023 Yes Alexy Gomez MD Yes    magnesium oxide (MAG-OX) 400 MG tablet Take 400 mg by mouth 2 times daily 4/17/2023 Yes Laine Leon APRN CNP     methocarbamol (ROBAXIN) 500 MG tablet Take 1 tablet (500 mg) by mouth 3 times daily as needed for muscle spasms (Cramps) Past Week Yes Alberto Garnica MD     omeprazole (PRILOSEC) 20 MG DR capsule Take 20 mg by mouth daily 4/17/2023 Yes Unknown, Entered By History No    PARoxetine (PAXIL) 10 MG tablet Take 20 mg by mouth daily 4/17/2023 Yes Reported, Patient Yes    potassium chloride ER (KLOR-CON M) 20 MEQ CR tablet Take 4 tablets (80 mEq) by mouth 2 times daily Take an additional 40 mEq on Tuesdays and Saturdays with IV Bumex 4/17/2023 Yes Nader Cisneros MD No    senna-docusate (SENOKOT-S/PERICOLACE) 8.6-50 MG tablet Take 1 tablet by mouth 2 times daily as needed for constipation Unknown Yes Unknown, Entered By History     spironolactone (ALDACTONE) 25 MG tablet Take 1 tablet (25 mg) by mouth daily for 90 days 4/17/2023 Yes Alexy Gomez MD Yes 5/16/23   tamsulosin (FLOMAX) 0.4 MG capsule Take 0.8 mg by mouth every evening This med helps with urinary retention 4/17/2023 Yes Nader Cisneros MD      torsemide (DEMADEX) 100 MG tablet Take 1 tablet (100 mg) by mouth 3 times daily 4/17/2023 Yes Alberto Garnica MD Yes    warfarin ANTICOAGULANT (COUMADIN) 4 MG tablet Take 1 tablet (4 mg) by mouth daily  Patient taking differently: 2 mg on Sundays, 4 mg all other days 4/17/2023 Yes Nader Cisneros MD No    zolpidem (AMBIEN) 5 MG tablet Take 5 mg by mouth nightly as needed for Insomnia 4/16/2023 Yes Reported, Patient     ciprofloxacin (CIPRO) 500 MG tablet Take 1 tablet (500 mg) by mouth 2 times daily for 30 days   Juan C Ayoub MD  5/7/23

## 2023-04-19 NOTE — PROGRESS NOTES
04/19/23 1400   Appointment Info   Signing Clinician's Name / Credentials (PT) Francoise Chapman, SPT   Student Supervision Direct Patient Contact Provided   Living Environment   People in Home grandchild(arnold);spouse   Current Living Arrangements house   Home Accessibility stairs to enter home   Number of Stairs, Main Entrance 3   Stair Railings, Main Entrance railings on both sides of stairs   Transportation Anticipated family or friend will provide   Living Environment Comments Pt lives in a house with his gradndaughter and wife. 3 JAYLYN and stairs within the home, but all needs are met on the first level.   Self-Care   Usual Activity Tolerance moderate   Current Activity Tolerance fair   Equipment Currently Used at Home walker, rolling;other (see comments)  (walking stick)   Fall history within last six months yes  (Reports falling while going up the stairs last week.)   Number of times patient has fallen within last six months 1   Activity/Exercise/Self-Care Comment Pt was previously IND with ADLs. Has a walking stick for ambulation. Sometimes uses a 4WW. Per OT pt also has a scooter for longer distances.   General Information   Onset of Illness/Injury or Date of Surgery 04/17/23   Referring Physician Antonette Aguiar, APRN CNP   Patient/Family Therapy Goals Statement (PT) Return to previous level of function   Pertinent History of Current Problem (include personal factors and/or comorbidities that impact the POC) Eliseo Tanner is a 67 year old male with chronic systolic heart failure secondary to NICM (Stage D, NYHA Class IIIA)s/p HM 3 on 2/18/2020 moderate CAD, HTN, ABHINAV on CPAP, DM2, CKD Stage III, ANA. 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 He was admitted on 4/17/2023 for volume overload,  recurrent recent admissions for HF.   Existing Precautions/Restrictions fall;other (see comments)  (LVAD)   Heart Disease Risk Factors Diabetes;High blood pressure;Overweight;Medical history;Gender;Age;Dislipidemia   Cognition   Affect/Mental Status (Cognition) WFL   Orientation Status (Cognition) oriented x 4   Follows Commands (Cognition) over 90% accuracy   Pain Assessment   Patient Currently in Pain Yes, see Vital Sign flowsheet   Integumentary/Edema   Integumentary/Edema Comments Edema wraps on BLE   Posture    Posture Forward head position;Protracted shoulders   Range of Motion (ROM)   Range of Motion ROM is WFL   Strength (Manual Muscle Testing)   Strength (Manual Muscle Testing) Deficits observed during functional mobility   Strength Comments Grossly reduced BUE and BLE strength   Bed Mobility   Comment, (Bed Mobility) Per clincal reasoning, impaired   Transfers   Comment, (Transfers) Sit to stand from EOB, CGA   Gait/Stairs (Locomotion)   Comment, (Gait/Stairs) Pt amb 5ft with BUE on IV pole, CGA. Unsteady gait with small step lengths, wide SHARI, and toe out.   Balance   Balance Comments Good seated balance, impaired standing balance   Sensory Examination   Sensory Perception Comments Pt denies numbness or tingling in B hands and feet   Clinical Impression   Criteria for Skilled Therapeutic Intervention Yes, treatment indicated   PT Diagnosis (PT) Impaired functional mobility   Influenced by the following impairments Strength, balance, activity tolerance   Functional limitations due to impairments Bed mobility, transfers, gait, stairs, and functional endurance   Clinical Presentation (PT Evaluation Complexity) Stable/Uncomplicated   Clinical Presentation Rationale Clinical reasoning   Clinical Decision Making (Complexity) low complexity   Planned Therapy Interventions (PT) balance training;bed mobility training;gait training;home exercise program;motor coordination training;neuromuscular  re-education;patient/family education;stair training;strengthening;transfer training;progressive activity/exercise;risk factor education;home program guidelines   Risk & Benefits of therapy have been explained evaluation/treatment results reviewed;care plan/treatment goals reviewed;risks/benefits reviewed;patient   PT Total Evaluation Time   PT Olamide, Low Complexity Minutes (35997) 12   Physical Therapy Goals   PT Frequency 5x/week   PT Predicted Duration/Target Date for Goal Attainment 05/03/23   PT Goals Bed Mobility;Transfers;Gait;Stairs   PT: Bed Mobility Independent;Supine to/from sit   PT: Transfers Independent;Sit to/from stand   PT: Gait Modified independent;Rolling walker;150 feet   PT: Stairs Modified independent;3 stairs;Rail on both sides   PT Discharge Planning   PT Plan Progress gait with 4WW, standing balance   PT Discharge Recommendation (DC Rec) Transitional Care Facility   PT Rationale for DC Rec Patient is functioning below baseline. Balance and proximal strength are limiting factors. At this time recommending TCU prior to safely discharging home.   PT Brief overview of current status Ax1 with FWW

## 2023-04-19 NOTE — PROGRESS NOTES
"   04/19/23 0936   Appointment Info   Signing Clinician's Name / Credentials (OT) An Marino, OTR/L  (edema)   Rehab Comments (OT) patient care order entered   General Information   Onset of Illness/Injury or Date of Surgery 04/17/23   Referring Physician Antonette Aguiar, JUAN CNP   Additional Occupational Profile Info/Pertinent History of Current Problem Per EMR, \"Eliseo Tanner is a 67 year old male with a past medical history significant for chronic systolic heart failure secondary to NICM (Stage D, NYHA Class IIIA) now s/p HM 3 on 2/18/2020 moderate CAD, HTN, ABHINAV on CPAP, DM2, CKD Stage III, ANA. 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 He was admitted on 4/17/2023 for volume overload management.\"   Edema General Information   Onset of Edema   (chronic (3yrs))   Affected Body Part(s) Right LE;Left LE   Edema Etiology   (CKD; volume overload)   General Comments/Previous Edema Treatment/Edema Equipment Pt reports experieincing edema since 2020, fluctuating between use of compression sock wear occasionally.   Edema Examination/Assessment   Skin Condition Pitting   Skin Condition Comments pt with hemosiderin stained snkled to mid shins   Scar Yes  (light scattered scarring to BLEs, hx of R surgical scars medial ankle)   Ulcerations   (mid shin bilateral pencil-eraser sized scab (x1 R, x2 L))   Pitting Assessment 2-3+ thoughout BLEs, firm   Edema Assessment Comments Pt agreeable to intervention   Clinical Impression   Criteria for Skilled Therapeutic Interventions Met (OT) Yes, treatment indicated   OT Diagnosis Decreased functional mobility and ax tolerance with increased edema   Edema: Patient Presentation Edema   OT Problem List-Impairments impacting ADL problems related to;activity tolerance impaired   Edema: Planned Interventions Gradient compression bandaging;Edema exercises;Precautions to prevent " "infection/exacerbation;Education;Manual therapy   Risk & Benefits of therapy have been explained evaluation/treatment results reviewed;care plan/treatment goals reviewed;participants included;patient   Clinical Impression Comments Pt may benefit form IP lymph management to reduce fluid to BLEs and maximize funcitonal mobility, endurance, and ax toleracne prior to return home   OT Goals   Therapy Frequency (OT)   (edema 4x/wk)   OT Predicted Duration/Target Date for Goal Attainment 05/04/23   OT Goals Edema   OT: Edema education to increase ability to manage edema after discharge from the hospital Patient;Verbalize;[place];signs/symptoms of intolerance;Skin care routine;wear schedule;limb positioning;garrnet/bandage care   OT: Management of edema bandages Patient;Verbalize;[place];garment(s);Demonstrate   OT: Functional edema exercise program to reduce limb volume, increase activity tolerance and improve independence with ADL Patient;Verbalize;Demonstrates;[place];HEP   Manual Therapy   Manual Therapy: Mobilization, MFR, MLD, friction massage minutes (79231) 39   Symptoms noted during/after treatment none   Treatment Detail/Skilled Intervention Edema: Pt educated on lymphatic system anatomy/physiology, precautions, and treatment options. See evaluation above for description of BLE edema and skin. Pt is appropriate for use of GCBs to promote fluid mobilization and edema management, for improved skin integrity, functional mobility, and ADL independence. Pt's LE's washed and lotioned with Sween 24. Donned Size M TGSoft base layer, followed by 1 x 8cm kasandra from MTP's to distal shin and 1 x 10 cm kasandra from ankle to knee crease using \"quick wrap technique\" with 50% overlap and 50% stretch. Surgilast added over tape for increased hold. Pt reporting comfort. Pt educated on wear 24-48 hr wear schedule and indications for removal (pain, numbness, tingling, soiled, or loose/falling off). Pt educated in " conservative strategies for managing BLE edema, including elevation and muscle pump activation. Pt verbalized understanding of all education. Patient care order in place.   OT Discharge Planning   OT Plan OT/CR: Functional transfers, Standing ADLS, Functional strength/endurance. Edema: New G2   Total Session Time   Timed Code Treatment Minutes 39   Total Session Time (sum of timed and untimed services) 39

## 2023-04-19 NOTE — PROGRESS NOTES
Therapy: DOBUTAMINE/INOTROPES  Insurance: MEDICARE/[place] SUPP  Ded: Does not apply    Max Out of Pocket: Does not apply    Pt Eliseo Tanner does not have coverage for IV Dobutamine in the home. Per our criteria check he does not meet Medicare criteria- Due to no evidence of documented improvement in patient's symptoms of heart failure while on the prescribed inotropic drug in the past. We will have to workup an ABN for the pt to sign if they were to start service with us.    In reference to referral from [facility] on pt admitted 04/17/2023 for IV dobutamine/Inotropes    Please contact Intake with any questions, [phone] or In Basket pool, [facility] ([phone]).

## 2023-04-20 NOTE — PROCEDURES
The patient's HeartMate LVAD was interrogated 4/20/2023  * Speed 5800 rpm   * Pulsatility index 3.6  * Power 4.8 Driver   * Flow 5.1 L/minute   Fluid status: hypervolemic   Alarms were reviewed, and notable for pump stoppage when controller changed 4/17.   The driveline exit site was inspected, dressing cdi.   All external components were inspected and showed no evidence of damage or malfunction, none replaced.   No changes to VAD settings made

## 2023-04-20 NOTE — PROGRESS NOTES
SPIRITUAL HEALTH SERVICES  PALLIATIVE SPIRITUAL ASSESSMENT   [facility] ([place]) 6C    Summary and Recommendations:    Patient Eliseo Tanner continues to name life-extending goals; he would benefit from, and is cautiously open to, ongoing discussions of goals of care.    He finds strength in Congregational jayden and family relationships.    I will follow for spiritual support while Palliative Care is consulted.    [facility] Inpatient Team Consult pager [phone] (M-F 8-4:30)  After-hours Answering Service [phone]       Saw pt Eliseo Tanner per patient request for follow up..    Patient/Family Understanding of Illness and Goals of Care:  Eliseo understands that his condition in worsening over time, while continuing to name life-extending goals. He has several upcoming family events that are really important to him, including driving to [place] in May, time with family in [place] in June, and daughter Mayra's wedding in September.    Distress and Loss: Eliseo is most worried about how his family is coping. He said he was unsettled by discussion of prognosis yesterday but felt better after sharing his emotions with God in prayer.    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 He and wife Jed are mutually supportive, and Eliseo also names relationships with his children and grandchildren, including granddaughter Laila, who he and Chapis have raised, as central supports. Eliseo also told stories which illustrated his work ethic and his commitment to caring for his family.    Meaning, Beliefs, and Spirituality: Congregational, as above. Regular practice of prayer and sense of God's presence are important to Eliseo, as is listening to Congregational music.       Jennifer Rosario M.Div., [facility]  Palliative Care Consult Service   Pager [phone]    [facility] Palliative [facility]  Securely message with the Andrea  Web Console (learn more here) or  Text page via [facility] Paging/Directory

## 2023-04-20 NOTE — PLAN OF CARE
D: Eliseo Tanner is a 67 year old male with chronic systolic heart failure secondary to NICM (Stage D, NYHA Class IIIA)s/p HM 3 on 2/18/2020 moderate CAD, HTN, ABHINAV on CPAP, DM2, CKD Stage III, ANA. 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 He was admitted on 4/17/2023 for volume overload, recurrent recent admissions for HF.      I: Monitored vitals and assessed patient status.     Running: Bumex 8 ml/hr     A: A&Ox4. VSS. Afebrile. RA sats> 93%.Urinating adequately. V-paced. LVAD #WNL, No alarm this shift.SBA        P: Continue to monitor patient status and report changes to treatment team.

## 2023-04-20 NOTE — PROGRESS NOTES
[facility]   Cardiology II Service / Advanced Heart Failure  Daily Progress Note      Patient: Eliseo Tanner  MRN: [id number]  Admission Date: 4/17/2023  Hospital Day # 3    Assessment and Plan: Eliseo Tanner is a 67 year old male with chronic systolic heart failure secondary to NICM (Stage D, NYHA Class IIIA)s/p HM 3 on 2/18/2020 moderate CAD, HTN, ABHINAV on CPAP, DM2, CKD Stage III, ANA. 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 He was admitted on 4/17/2023 for volume overload, recurrent recent admissions for HF.     Today's Plan:  -Bumex 2/hr, q12hr lytes  -may redose thiazide this PM after K replete  -will check VBG in AM off PICC  -when close to euvolemic, consider echo speed opti +/- RHC    # Acute on chronic BiV systolic heart failure/HFrEF secondary to NICM    # s/p HM3 LVAD as DT on 2/2023  # Severe RV failure  Stage D. NYHA Class IV. Recurrent admissions for HF this year - like 2/2 refractory RV failure. Speed decreased from 6000rpm to 5800 rpm in 2/2023 when LVIdd 3.7cm, no AoV opening, and severe RV dysfunction.      Fluid status: hypervolemic, Bumex 2 mg/hr gtt, given metolazone 2.5 mg on 4/19 (pta torsemide 100 mg tid with IV Bumex daily as outpatient and hydrochlorothiazide 75 mg twice weekly   ACEi/ARB/ARNi: contraindicated due to renal dysfunction  Afterload reduction: hydralazine 50 mg tid (decreased this admission)  BB contraindicated due to RV failure  Aldosterone antagonist: spironolactone 25 mg daily  SCD prophylaxis ICD  MAP: at goal on above plus amlodipine 10 mg   LDH trends: 363 <- 366 <- 345 <- 582, monitor intermittently  Anticoagulation: warfarin dosing per pharmacy, INR goal 1.7-2.3 due to recurrent epistaxis, today 2.5  Antiplatelet: aspirin 81 mg daily  Speed:  5800 rpm    # BERNARDA on CKD, cardiorenal, improved  Cr 2.2 on arrival, stably elevated in last month, thought in setting of Bactrim which was discontinued. Prior baseline ~1.4. BERNARDA this admission likely in part cardiorenal syndrome. Urinalysis with microscopy bland  - Cr. 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 Note: grown quinolone resistant organisms, but ID previously thought given stable clinical picture that okay to continue on cipro. Low threshold to consult ID.      - continue pta ciprofloxacin 750mg BID       - continue pta IV daptomycin 6mg q24h      # History of Heparin Induced Thrombocytopenia   - avoid heparin products      # Hypothyroidism   - pta levothyroxine 88 mcg qAM      # GERD   - pta pantoprazole every day      # Mood disorder   - pta paroxetine 20 mg every day      # Iron deficiency Anemia   - pta ferrous sulfate 325 every day   - iron studies, sat 11% - IV venofer 200 mg daily x5     # BPH   - pta finasteride 5 mg every day   - pta tamsulosin 0.8 mg qPM      # Nutritional Supplementation   - pta multivitamin (renalvite/nephrovite) every day      # T2DM   Most recent A1c 6.3 on 9/2/2022   - monitor   - hypoglycemia protocol     # Gout   - pta allopurinol      # Muscle spasms, acute on chronic due to Bumex gtt  - pta methocarbamol, scheduled      FEN: 2g Na 2L FR  PROPHY:  warfarin, PPI  LINES:  PICC   DISPO:  Pending diuresis, likely 4-5 days  CODE STATUS:  Full code    Siena Aguiar DNP, NP-C  Advanced Heart Failure/Cardiology II Service  Pager [phone] [facility] [phone]      Patient discussed with Dr. Gomes.        35 minutes spent on the date of the encounter doing chart review, history and exam, documentation and further activities per the note    ================================================================    Subjective/24-Hr  "Events:   Last 24 hr care team notes reviewed. No overnight events. Good uop but weight is up 1 lb. breathing stable. Cramping has improved. Denies lightheadedness. Working with therapies.     ROS:  4 point ROS including respiratory, CV, GI and  (other than that noted in the HPI) is negative.     Medications: Reviewed in EPIC.     Physical Exam:   BP 90/71 (BP Location: Left arm)   Pulse 104   Temp 97.7  F (36.5  C) (Oral)   Resp 22   Ht 1.702 m (5' 7\")   Wt 89 kg (196 lb 3.2 oz)   SpO2 94%   BMI 30.73 kg/m      GENERAL: Appears comfortable, in no distress .  HEENT: Eye symmetrical, no discharge or icterus bilaterally. Mucous membranes moist and without lesions.  NECK: Supple, JVD at least midneck at 90 degrees.   CV: +mechanical LVAD hum.   RESPIRATORY: Respirations regular, even, and unlabored. Lungs clear.  GI: Soft, and mildly distended with normoactive bowel sounds present in all quadrants. No tenderness, rebound, guarding.   EXTREMITIES: 2+ peripheral edema to thighs in wraps, ?faint bilateral radial pulses.   NEUROLOGIC: Alert and oriented x 3. No focal deficits.   MUSCULOSKELETAL: No joint swelling or tenderness.   SKIN: No jaundice. No rashes or lesions.     Labs:  CMP  Recent Labs   Lab 04/20/23  1223 04/20/23  0547 04/19/23  2203 04/19/23  1840 04/19/23  1702 04/19/23  0718 04/19/23  0457 04/18/23  2147 04/18/23  1759   NA  --  132*  --   --  130*  --  132*  --  132*   POTASSIUM  --  3.3*  --   --  4.1  --  3.6  --  4.0   CHLORIDE  --  96*  --   --  98  --  99  --  97*   CO2  --  21*  --   --  21*  --  22  --  20*   ANIONGAP  --  15  --   --  11  --  11  --  15   * 132* 155* 118* 178*   < > 145*   < > 128*   BUN  --  61.3*  --   --  64.9*  --  69.6*  --  75.5*   CR  --  1.87*  --   --  1.77*  --  1.69*  --  1.89*   GFRESTIMATED  --  38*  --   --  41*  --  43*  --  38*   CALOS  --  8.4*  --   --  7.8*  --  7.9*  --  8.1*   MAG  --  2.0  --   --  2.0  --  2.1  --  2.6*    < > = values in this " interval not displayed.       CBC  Recent Labs   Lab 04/20/23  0547 04/19/23  0457 04/18/23  0535 04/17/23  1716   WBC 9.8 9.0 9.7 12.7*   RBC 3.84* 3.63* 3.52* 3.80*   HGB 10.9* 10.1* 9.9* 10.9*   HCT 34.9* 33.1* 31.6* 34.3*   MCV 91 91 90 90   MCH 28.4 27.8 28.1 28.7   MCHC 31.2* 30.5* 31.3* 31.8   RDW 21.7* 21.5* 21.1* 21.1*    205 205 250       INR  Recent Labs   Lab 04/20/23  0547 04/19/23  0457 04/18/23  0535 04/17/23  1716   INR 2.50* 2.27* 2.24* 2.29*

## 2023-04-21 NOTE — PROCEDURES
The patient's HeartMate LVAD was interrogated 4/21/2023  * Speed 5800 rpm   * Pulsatility index 3.4-3.8  * Power 4.8 Driver   * Flow 5.1 L/minute   Fluid status: hypervolemic, improved  Alarms were reviewed, and notable for pump stoppage when controller changed 4/17.   The driveline exit site was inspected, dressing cdi.   All external components were inspected and showed no evidence of damage or malfunction, none replaced.   No changes to VAD settings made

## 2023-04-21 NOTE — PROGRESS NOTES
Care Management Follow Up    Length of Stay (days): 4    Expected Discharge Date: 04/24/2023     Concerns to be Addressed: Coping and Adjustment to Illness   Patient plan of care discussed at interdisciplinary rounds: Yes    Anticipated Discharge Disposition:       Anticipated Discharge Services:    Anticipated Discharge DME:      Patient/family educated on Medicare website which has current facility and service quality ratings:    Education Provided on the Discharge Plan:    Patient/Family in Agreement with the Plan:      Referrals Placed by CM/SW:  None   Private pay costs discussed: Not applicable    Additional Information:  LVAD  provided supportive visit today and inquired if pt wanted to discuss HCD. Pt reports he is doing ok he is sad but hopeful and motivated after our care conference on Wed. Pt reports he and his wife have not had an opportunity to look at the HCD. Wife is coming tomorrow. Writer happens to be working so will stop in and check-in with them if they want to complete HCD.     Pt anticipated to be here over the weekend.       Cintia Perkins, VERÓNICASW

## 2023-04-21 NOTE — PROGRESS NOTES
[facility]   Cardiology II Service / Advanced Heart Failure  Daily Progress Note      Patient: Eliseo Tanner  MRN: [id number]  Admission Date: 4/17/2023  Hospital Day # 4    Assessment and Plan: Eliseo Tanner is a 67 year old male with chronic systolic heart failure secondary to NICM (Stage D, NYHA Class IIIA)s/p HM 3 on 2/18/2020 moderate CAD, HTN, ABHINAV on CPAP, DM2, CKD Stage III, ANA. 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 He was admitted on 4/17/2023 for volume overload, recurrent recent admissions for HF.     Today's Plan:  -Bumex 2/hr, q12hr lytes  -Kcl 20 meq IV today in addition to protocol and scheduled  -IV diuril 500 mg   -repeat K midday  -when close to euvolemic, consider echo speed opti +/- RHC    # Acute on chronic BiV systolic heart failure/HFrEF secondary to NICM    # s/p HM3 LVAD as DT on 2/2023  # Severe RV failure  Stage D. NYHA Class IV. Recurrent admissions for HF this year - like 2/2 refractory RV failure. Speed decreased from 6000rpm to 5800 rpm in 2/2023 when LVIdd 3.7cm, no AoV opening, and severe RV dysfunction.      Fluid status: hypervolemic, improved,  lb, Bumex 2 mg/hr gtt, IV diuril today after K repleted (pta torsemide 100 mg tid with IV Bumex daily as outpatient and hydrochlorothiazide 75 mg twice weekly)  ACEi/ARB/ARNi: contraindicated due to renal dysfunction  Afterload reduction: hydralazine 50 mg tid (decreased this admission)  BB contraindicated due to RV failure  Aldosterone antagonist: spironolactone 25 mg daily  SCD prophylaxis ICD  MAP: at goal on above plus amlodipine 10 mg   LDH trends: 333 <- 363 <- 366 <- 345 <- 582, monitor intermittently  Anticoagulation: warfarin dosing per pharmacy, INR goal 1.7-2.3 due to recurrent epistaxis,  today 2.8  Antiplatelet: aspirin 81 mg daily  Speed: 5800 rpm    # BERNARDA on CKD, cardiorenal, improved  # Hypervolemic hyponatremia   Cr 2.2 on arrival, stably elevated in last month, thought in setting of Bactrim which was discontinued. Prior baseline ~1.4. BERNARDA this admission likely in part cardiorenal syndrome. Urinalysis with microscopy bland  - Cr. 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 Note: grown quinolone resistant organisms, but ID previously thought given stable clinical picture that okay to continue on cipro. Low threshold to consult ID.      - continue pta ciprofloxacin 750mg BID       - continue pta IV daptomycin 6mg q24h      # History of Heparin Induced Thrombocytopenia   - avoid heparin products      # Hypothyroidism   - pta levothyroxine 88 mcg qAM      # GERD   - pta pantoprazole every day      # Mood disorder   - pta paroxetine 20 mg every day      # Iron deficiency Anemia   - pta ferrous sulfate 325 every day   - iron studies, sat 11% - IV venofer 200 mg daily x5     # BPH   - pta finasteride 5 mg every day   - pta tamsulosin 0.8 mg qPM      # Nutritional Supplementation   - pta multivitamin (renalvite/nephrovite) every day      # T2DM   Most recent A1c 6.3 on 9/2/2022   - monitor   - hypoglycemia protocol     # Gout   - pta allopurinol      # Muscle spasms, acute on chronic due to Bumex gtt  - pta methocarbamol, scheduled      FEN: 2g Na 2L FR  PROPHY:  warfarin, PPI  LINES:  PICC   DISPO:  Pending diuresis, likely ~4 days  CODE STATUS:  Full code    Siena Aguiar DNP, NP-C  Advanced Heart Failure/Cardiology II Service  Pager [phone] [facility] [phone]      Patient discussed with Dr. Boyce.        35 minutes spent on the date of the encounter doing chart review, history and exam, documentation and further activities per  "the note    ================================================================    Subjective/24-Hr Events:   Last 24 hr care team notes reviewed. No overnight events. Lost weight. LE edema improved. Denies shortness of breath, orthopnea. No new symptoms. Cramping stable.     ROS:  4 point ROS including respiratory, CV, GI and  (other than that noted in the HPI) is negative.     Medications: Reviewed in EPIC.     Physical Exam:   BP (!) 85/57   Pulse 97   Temp 98.5  F (36.9  C) (Oral)   Resp 16   Ht 1.702 m (5' 7\")   Wt 86.4 kg (190 lb 6.4 oz)   SpO2 100%   BMI 29.82 kg/m      GENERAL: Appears comfortable, in no distress .  HEENT: Eye symmetrical, no discharge or icterus bilaterally. Mucous membranes moist and without lesions.  NECK: Supple, JVD midneck at 90 degrees.   CV: +mechanical LVAD hum.   RESPIRATORY: Respirations regular, even, and unlabored. Lungs clear.  GI: Soft, and mildly distended with normoactive bowel sounds present in all quadrants. No tenderness, rebound, guarding.   EXTREMITIES: 2+ peripheral edema in knees and thighs, improved low legs, non pulsatile.   NEUROLOGIC: Alert and oriented x 3. No focal deficits.   MUSCULOSKELETAL: No joint swelling or tenderness.   SKIN: No jaundice. No rashes or lesions.     Labs:  CMP  Recent Labs   Lab 04/21/23  0353 04/20/23  2154 04/20/23  1720 04/20/23  1615 04/20/23  1223 04/20/23  0547 04/19/23  1840 04/19/23  1702 04/19/23  0718 04/19/23  0457   *  --   --  128*  --  132*  --  130*  --  132*   POTASSIUM 3.0*  --   --  3.9  --  3.3*  --  4.1  --  3.6   CHLORIDE 94*  --   --  92*  --  96*  --  98  --  99   CO2 25  --   --  23  --  21*  --  21*  --  22   ANIONGAP 15  --   --  13  --  15  --  11  --  11   * 184* 166* 119*   < > 132*   < > 178*   < > 145*   BUN 63.4*  --   --  62.2*  --  61.3*  --  64.9*  --  69.6*   CR 1.85*  --   --  1.76*  --  1.87*  --  1.77*  --  1.69*   GFRESTIMATED 39*  --   --  41*  --  38*  --  41*  --  43*   CALOS 8.4* "  --   --  8.6*  --  8.4*  --  7.8*  --  7.9*   MAG 1.8  --   --   --   --  2.0  --  2.0  --  2.1    < > = values in this interval not displayed.       CBC  Recent Labs   Lab 04/21/23  0353 04/20/23  0547 04/19/23 0457 04/18/23  0535   WBC 8.5 9.8 9.0 9.7   RBC 3.68* 3.84* 3.63* 3.52*   HGB 10.5* 10.9* 10.1* 9.9*   HCT 33.5* 34.9* 33.1* 31.6*   MCV 91 91 91 90   MCH 28.5 28.4 27.8 28.1   MCHC 31.3* 31.2* 30.5* 31.3*   RDW 21.9* 21.7* 21.5* 21.1*    212 205 205       INR  Recent Labs   Lab 04/21/23  0353 04/20/23  0547 04/19/23  0457 04/18/23  0535   INR 2.89* 2.50* 2.27* 2.24*

## 2023-04-21 NOTE — PLAN OF CARE
Dx: Admitted 4/17 for HF exacerbation    Neuro: A&O x4. [place]- has hearing aids.  Cardiac: SR BBB with r/. No LVAD alarm this AM. MAP WNL.   Respiratory: On RA. LS clear.   GI/: No BM this shift. Voiding. Output up.   Diet: 2g Na+; 2L FR  Skin: No new skin deficit  Pain: Denied  Drips:  Bumex gtt continued at 8mL/hr to R SL PICC  LDAs: R PIV   Electrolytes: Pt will need additional 40mEq of Kcl this AM with scheduled dose. Mg to be replaced.   Mobility: SBA/IND    Plan:  Continue to diurese until euvolemic.    Goal Outcome Evaluation:      Plan of Care Reviewed With: patient    Overall Patient Progress: no changeOverall Patient Progress: no change    Outcome Evaluation: Continue to require K replacement while on bumex gtt. No arrhythmias.

## 2023-04-21 NOTE — PLAN OF CARE
"BP (!) 85/57   Pulse 100   Temp 98  F (36.7  C)   Resp 16   Ht 1.702 m (5' 7\")   Wt 86.4 kg (190 lb 6.4 oz)   SpO2 100%   BMI 29.82 kg/m      Dx HF exacerbation     AO X 4, maps 60's, SR w/ BBB, HR 90's - low 100's, room air, denies pain, weight decreased this AM, trace dependent edema, lymphedema wraps removed, IV Bumex and Diuril, oral and IV replacement of magnesium and potassium, VAD numbers WDL. Last BM 4/21. Good appetite and UOP.  "

## 2023-04-22 NOTE — PROGRESS NOTES
D/awoke easily for vitals after pms gave sleeping med, then appeared to sleep, continues on BUMEX drip, PI is 2's while asleep  P/monitor for changes

## 2023-04-22 NOTE — PLAN OF CARE
I/A: A/Ox4. VSS on RA. SR/ST w/ BBB. LVAD #'s WNL, no alarms, dressing CDI. Tolerating 2g Na diet w/ 2L FR. BG ACHS. Mild BLE edema. Voiding well. LBM 4/21. SBA/I. Worked with therapies.     Drips: Bumex gtt at 2 mg/hr via R SL PICC    P: Diuresing. Encourage activity.    Hours of care: 0618-1129

## 2023-04-22 NOTE — PROCEDURES
The patient's HeartMate LVAD was interrogated 4/22/2023  * Speed 38651 rpm   * Pulsatility index 3.5-3.8   * Power 4.8 Driver   * Flow 5.0 L/minute   Fluid status: hypervolemic, improving   Alarms were reviewed, and notable for rare PI events.   The driveline exit site was inspected, CDI.   All external components were inspected and showed no evidence of damage or malfunction, none replaced.   No changes to VAD settings made

## 2023-04-22 NOTE — PROGRESS NOTES
[facility]   Cardiology II Service / Advanced Heart Failure  Daily Progress Note      Patient: Eliseo Tanner  MRN: [id number]  Admission Date: 4/17/2023  Hospital Day # 5    Assessment and Plan: Eliseo Tanner is a 67 year old male with chronic systolic heart failure secondary to NICM (Stage D, NYHA Class IIIA)s/p HM 3 on 2/18/2020 moderate CAD, HTN, ABHINAV on CPAP, DM2, CKD Stage III, ANA. 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 He was admitted on 4/17/2023 for volume overload, recurrent recent admissions for HF.      Today's Plan:  - Bumex 2/hr, q12hr lytes  - Kcl 40 meq IV per protocol, plus scheduled Kcl with repeat K midday  - consider repeat diuril tomorrow   - when close to euvolemic, consider echo speed opti +/- RHC       # Acute on chronic BiV systolic heart failure/HFrEF secondary to NICM    # s/p HM3 LVAD as DT on 2/2023  # Severe RV failure  Stage D. NYHA Class IV. Recurrent admissions for HF this year - like 2/2 refractory RV failure. Speed decreased from 6000rpm to 5800 rpm in 2/2023 when LVIdd 3.7cm, no AoV opening, and severe RV dysfunction.       Fluid status: hypervolemic, improved,  lb, Bumex 2 mg/hr gtt, IV diuril today after K repleted (pta torsemide 100 mg tid with IV Bumex daily as outpatient and hydrochlorothiazide 75 mg twice weekly)  ACEi/ARB/ARNi: contraindicated due to renal dysfunction  Afterload reduction: hydralazine 50 mg tid (decreased this admission)  BB contraindicated due to RV failure  Aldosterone antagonist: spironolactone 25 mg daily  SCD prophylaxis ICD  MAP: at goal on above plus amlodipine 10 mg   LDH trends: 333 <- 363 <- 366 <- 345 <- 582, monitor intermittently  Anticoagulation: warfarin dosing per pharmacy, INR goal 1.7-2.3 due to  recurrent epistaxis, today 4.39  Antiplatelet: aspirin 81 mg daily  Speed: 5800 rpm  running test claim for palliative IV dobutamine      # BERNARDA on CKD, cardiorenal, improved  # Hypervolemic hyponatremia   Cr 2.2 on arrival, stably elevated in last month, thought in setting of Bactrim which was discontinued. Prior baseline ~1.4. BERNARDA this admission likely in part cardiorenal syndrome. Urinalysis with microscopy bland  - Cr. 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 Note: grown quinolone resistant organisms, but ID previously thought given stable clinical picture that okay to continue on cipro. Low threshold to consult ID.      - continue pta ciprofloxacin 750mg BID       - continue pta IV daptomycin 6mg q24h      # History of Heparin Induced Thrombocytopenia   - avoid heparin products      # Hypothyroidism   - pta levothyroxine 88 mcg qAM      # GERD   - pta pantoprazole every day      # Mood disorder   - pta paroxetine 20 mg every day      # Iron deficiency Anemia   - pta ferrous sulfate 325 every day-holding 2/2 to interaction with cipro    - iron studies, sat 11% - IV venofer 200 mg daily x5     # BPH   - pta finasteride 5 mg every day   - pta tamsulosin 0.8 mg qPM      # Nutritional Supplementation   - pta multivitamin (renalvite/nephrovite) every day      # T2DM   Most recent A1c 6.3 on 9/2/2022   - monitor   - hypoglycemia protocol      # Gout   - pta allopurinol      # Muscle spasms, acute on chronic due to Bumex gtt  - pta methocarbamol, scheduled        FEN: 2g Na 2L FR  PROPHY:  warfarin, PPI  LINES:  PICC   DISPO:  Pending diuresis, likely ~4 days  CODE STATUS:  Full code      ================================================================    Subjective/24-Hr Events:   Last 24 hr care team notes reviewed. No events overnight.  "Robust response to diuril. Feeling well. Denies any pain or exertional symptoms. Denies orthopnea, PND, LE edema. Some abdominal distention, improving. Some cramping in hands-stable.     ROS:  4 point ROS including respiratory, CV, GI and  (other than that noted in the HPI) is negative.     Medications: Reviewed in EPIC.     Physical Exam:   BP (!) 79/59   Pulse 98   Temp 98.8  F (37.1  C) (Oral)   Resp 16   Ht 1.702 m (5' 7\")   Wt 84.4 kg (186 lb)   SpO2 97%   BMI 29.13 kg/m      GENERAL: Appears comfortable, in no distress .  HEENT: Eye symmetrical, no discharge or icterus bilaterally. Mucous membranes moist and without lesions.  NECK: Supple, JVD midneck.   CV: + LVAD hum  RESPIRATORY: Respirations regular, even, and unlabored. Lungs CTA throughout.    GI: Soft and non distended with normoactive bowel sounds present in all quadrants. No tenderness, rebound, guarding.   EXTREMITIES: 1+ peripheral edema in knees. 2+ bilateral pedal pulses.   NEUROLOGIC: Alert and oriented x 3. No focal deficits.   MUSCULOSKELETAL: No joint swelling or tenderness.   SKIN: No jaundice. No rashes or lesions.     Labs:  CMP  Recent Labs   Lab 04/22/23  0723 04/22/23  0611 04/21/23  2357 04/21/23  2124 04/21/23  1742 04/21/23  1443 04/21/23  1208 04/21/23  1156 04/21/23  0353 04/20/23  1720 04/20/23  1615 04/20/23  1223 04/20/23  0547   NA  --  134*  --   --   --  135*  --   --  134*  --  128*  --  132*   POTASSIUM  --  3.1*  --   --   --  4.3  4.2  --  3.7 3.0*  --  3.9  --  3.3*   CHLORIDE  --  94*  --   --   --  96*  --   --  94*  --  92*  --  96*   CO2  --  26  --   --   --  24  --   --  25  --  23  --  21*   ANIONGAP  --  14  --   --   --  15  --   --  15  --  13  --  15   * 123* 141* 123*   < > 149*   < >  --  120*   < > 119*   < > 132*   BUN  --  62.3*  --   --   --  65.2*  --   --  63.4*  --  62.2*  --  61.3*   CR  --  1.72*  --   --   --  1.79*  --   --  1.85*  --  1.76*  --  1.87*   GFRESTIMATED  --  42*  --   " --   --  41*  --   --  39*  --  41*  --  38*   CALOS  --  8.4*  --   --   --  8.6*  --   --  8.4*  --  8.6*  --  8.4*   MAG  --  1.8  --   --   --  1.9  --   --  1.8  --   --   --  2.0    < > = values in this interval not displayed.       CBC  Recent Labs   Lab 04/22/23  0611 04/21/23  0353 04/20/23  0547 04/19/23 0457   WBC 8.2 8.5 9.8 9.0   RBC 3.61* 3.68* 3.84* 3.63*   HGB 10.2* 10.5* 10.9* 10.1*   HCT 33.4* 33.5* 34.9* 33.1*   MCV 93 91 91 91   MCH 28.3 28.5 28.4 27.8   MCHC 30.5* 31.3* 31.2* 30.5*   RDW 22.3* 21.9* 21.7* 21.5*    201 212 205       INR  Recent Labs   Lab 04/22/23  0611 04/21/23  0353 04/20/23  0547 04/19/23  0457   INR 4.39* 2.89* 2.50* 2.27*       Time/Communication  I personally spent a total of 35 minutes. Of that 20 minutes was counseling/coordination of patient's care. Plan of care discussed with patient. See my note above for details.    Patient discussed with Dr. Tomas.        Francoise Peters DNP, NP-C  Advanced Heart Failure/Cardiology II Service  Pager [phone] [facility] [phone]

## 2023-04-22 NOTE — PLAN OF CARE
"BP (!) 79/59   Pulse 98   Temp 98.8  F (37.1  C) (Oral)   Resp 16   Ht 1.702 m (5' 7\")   Wt 84.4 kg (186 lb)   SpO2 97%   BMI 29.13 kg/m      AO X 4, maps 60's, sinus to sinus tach w/ BBB, HR 90's - low 100's, occasional pacing, on room air with clear lung sounds, denies pain, weight decreasing with diuresis, trace edema, compression socks on, IV Bumex, oral and IV replacement of magnesium and potassium, VAD numbers WDL. Last BM 4/21. Good appetite and UOP.  "

## 2023-04-23 NOTE — PROCEDURES
The patient's HeartMate LVAD was interrogated 4/23/2023  * Speed 5800 rpm   * Pulsatility index 3.4-3.5   * Power 4.8 Driver   * Flow 5.1 L/minute   Fluid status: hypervolemic, improving   Alarms were reviewed, and notable for no active alarms.   The driveline exit site was inspected, C/D/I.   All external components were inspected and showed no evidence of damage or malfunction, none replaced.   No changes to VAD settings made

## 2023-04-23 NOTE — PLAN OF CARE
"BP 93/54   Pulse 85   Temp 98  F (36.7  C) (Oral)   Resp 16   Ht 1.702 m (5' 7\")   Wt 82.6 kg (182 lb 3.2 oz)   SpO2 96%   BMI 28.54 kg/m       AO X 4, MAP 76, sinus to sinus tach with RBBB, room air with clear lung sounds, denies pain, trace edema, compressions socks on, IV Bumex and IV ABx, oral and IV replacement of mag and K, VAD numbers WDL. INR 2.44. Good UOP.    CT of chest pelvis abdomen to assess chronic driveline infection for fluid collection.  "

## 2023-04-23 NOTE — PLAN OF CARE
Admitted on 4/17/2023 for volume overload, recurrent recent admissions for HF. PMHx of   chronic systolic heart failure secondary to NICM s/p HM 3 on 2/18/2020, moderate CAD, HTN, ABHINAV on CPAP, DM2, CKD Stage III, ANA. 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     Code status: Full Code   Team: Cards 2     Neuro: Aox4, calm and cooperative  Cardiac/Tele: SR/ST with BBB, Occasional pacing. LVAD numbers WDL, MAPs WNL, other VSS  Respiratory: RA, CPAP when asleep. Sats > 92%  GI/: Voids spontaneously. BS audible and passing flatus. LBM 4/22  Diet/Appetite: 2g Na diet with 2L FR  Skin: LVAD driveline site wound (Refer to wound care order). Skin tear on the R upper arm, covered with dressing. Generalized bruising  Endocrine/Electrolytes: BG checks ACHS with sliding scale coverage. Replace lytes per protocols (K and Mg on high replacement protocols)  LDAs: SL PICC infusing Bumex at 2 mg/hr. PIV saline locked  Activity: Up SBA  Pain: Denies pain     Plan: Continue POC, notify team of concerns

## 2023-04-23 NOTE — PROGRESS NOTES
[facility]   Cardiology II Service / Advanced Heart Failure  Daily Progress Note      Patient: Eliseo Tanner  MRN: [id number]  Admission Date: 4/17/2023  Hospital Day # 6    Assessment and Plan: Eliseo Tanner is a 67 year old male with chronic systolic heart failure secondary to NICM (Stage D, NYHA Class IIIA)s/p HM 3 on 2/18/2020 moderate CAD, HTN, ABHINAV on CPAP, DM2, CKD Stage III, ANA. 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 He was admitted on 4/17/2023 for volume overload, recurrent recent admissions for HF.      Today's Plan:  - Bumex 2/hr, q12hr lytes. Metolazone 2.5 mg x 1 dose this afternoon.   - Kcl 60 meq IV per protocol, plus scheduled Kcl with repeat K midday  - Repeat INR with chromogenic factor 10  - Start cilostazol 50 mg BID for possible benefits in improving RV dysfunction/PH  - Curbside ID; agree with CT A/P per recent OP ID recs. Formal ID consult if CT with evidence of increased driveline fluid collection.  - When close to euvolemic, consider echo speed opti +/- RHC        # Acute on chronic BiV systolic heart failure/HFrEF secondary to NICM    # s/p HM3 LVAD as DT on 2/2023  # Severe RV failure  Stage D. NYHA Class IV. Recurrent admissions for HF this year - like 2/2 refractory RV failure. Speed decreased from 6000rpm to 5800 rpm in 2/2023 when LVIdd 3.7cm, no AoV opening, and severe RV dysfunction.       Fluid status: hypervolemic, improved,  lb, Bumex 2 mg/hr gtt, IV diuril today after K repleted (pta torsemide 100 mg tid with IV Bumex daily as outpatient and hydrochlorothiazide 75 mg twice weekly)  ACEi/ARB/ARNi: contraindicated due to renal dysfunction  Afterload reduction: hydralazine 50 mg tid (decreased this admission)  BB contraindicated due to RV  failure  Aldosterone antagonist: spironolactone 25 mg daily  SCD prophylaxis ICD  MAP: at goal on above plus amlodipine 10 mg   LDH trends: 332, stable with intermittent monitoring   Anticoagulation: warfarin dosing per pharmacy, INR goal 1.7-2.3 due to recurrent epistaxis, today 2.44  Antiplatelet: aspirin 81 mg daily  Speed: 5800 rpm  running test claim for palliative IV dobutamine      # BERNARDA on CKD, cardiorenal, improved  # Hypervolemic hyponatremia   Cr 2.2 on arrival, stably elevated in last month, thought in setting of Bactrim which was discontinued. Prior baseline ~1.4. BERNARDA this admission likely in part cardiorenal syndrome. Urinalysis with microscopy bland  - Cr. 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 Note: grown quinolone resistant organisms, but ID previously thought given stable clinical picture that okay to continue on cipro. Low threshold to consult ID.  - continue pta ciprofloxacin 750mg BID   - continue pta IV daptomycin 6mg q24h   - OP ID visit 4/14 with recs for CT A/P, curbside ID consult recommending to proceed with CT A/P with formal consult to follow if CT with acute findings  - Planning out of state travel for a weekend in May and again in June. Per ID, okay to transition from IV dapto to PO abx for brief period of time. (Consider bactrim vs linezolid). Will need to follow up with ID OP.      # History of Heparin Induced Thrombocytopenia   - avoid heparin products      # Hypothyroidism   - pta levothyroxine 88 mcg qAM      # GERD   - pta pantoprazole every day      # Mood disorder   - pta paroxetine 20 mg every day      # Iron deficiency Anemia   - pta ferrous sulfate 325 every day-holding 2/2 to interaction with cipro    - iron studies, sat 11% - IV venofer 200 mg daily x5     # BPH   - pta finasteride 5 mg every  "day   - pta tamsulosin 0.8 mg qPM      # Nutritional Supplementation   - pta multivitamin (renalvite/nephrovite) every day      # T2DM   Most recent A1c 6.3 on 9/2/2022   - monitor   - hypoglycemia protocol      # Gout   - pta allopurinol      # Muscle spasms, acute on chronic due to Bumex gtt  - pta methocarbamol, scheduled        FEN: 2g Na 2L FR  PROPHY:  warfarin, PPI  LINES:  PICC   DISPO:  Pending diuresis, likely ~4 days  CODE STATUS:  Full code      ================================================================    Subjective/24-Hr Events:   Last 24 hr care team notes reviewed. No events overnight. Robust response to diuril. Feeling well. Denies any pain or exertional symptoms. Denies orthopnea, PND, LE edema. Some abdominal distention, improving. Some cramping in hands-stable.     ROS:  4 point ROS including respiratory, CV, GI and  (other than that noted in the HPI) is negative.     Medications: Reviewed in EPIC.     Physical Exam:   BP (!) 103/93 (BP Location: Left arm)   Pulse 85   Temp 98.1  F (36.7  C) (Oral)   Resp 18   Ht 1.702 m (5' 7\")   Wt 82.6 kg (182 lb 3.2 oz)   SpO2 96%   BMI 28.54 kg/m      GENERAL: Appears comfortable, in no distress .  HEENT: Eye symmetrical, no discharge or icterus bilaterally. Mucous membranes moist and without lesions.  NECK: Supple, JVD midneck.   CV: + LVAD hum  RESPIRATORY: Respirations regular, even, and unlabored. Lungs CTA throughout.    GI: Soft and non distended with normoactive bowel sounds present in all quadrants. No tenderness, rebound, guarding.   EXTREMITIES: 1+ peripheral edema in knees. 2+ bilateral pedal pulses.   NEUROLOGIC: Alert and oriented x 3. No focal deficits.   MUSCULOSKELETAL: No joint swelling or tenderness.   SKIN: No jaundice. No rashes or lesions.     Labs:  CMP  Recent Labs   Lab 04/22/23  2105 04/22/23  1747 04/22/23  1624 04/22/23  1446 04/22/23  1211 04/22/23  0723 04/22/23  0611 04/21/23  1742 04/21/23  1443 04/21/23  1156 " 04/21/23 0353   NA  --   --  130*  --   --   --  134*  --  135*  --  134*   POTASSIUM  --   --  4.0  3.9 4.3  --   --  3.1*  --  4.3  4.2   < > 3.0*   CHLORIDE  --   --  92*  --   --   --  94*  --  96*  --  94*   CO2  --   --  22  --   --   --  26  --  24  --  25   ANIONGAP  --   --  16*  --   --   --  14  --  15  --  15   * 178* 196*  --  96   < > 123*   < > 149*   < > 120*   BUN  --   --  63.5*  --   --   --  62.3*  --  65.2*  --  63.4*   CR  --   --  1.94*  --   --   --  1.72*  --  1.79*  --  1.85*   GFRESTIMATED  --   --  37*  --   --   --  42*  --  41*  --  39*   CALOS  --   --  8.1*  --   --   --  8.4*  --  8.6*  --  8.4*   MAG  --   --  1.8  --   --   --  1.8  --  1.9  --  1.8    < > = values in this interval not displayed.       CBC  Recent Labs   Lab 04/23/23 0620 04/22/23 0611 04/21/23 0353 04/20/23  0547   WBC 7.9 8.2 8.5 9.8   RBC 3.74* 3.61* 3.68* 3.84*   HGB 10.7* 10.2* 10.5* 10.9*   HCT 34.6* 33.4* 33.5* 34.9*   MCV 93 93 91 91   MCH 28.6 28.3 28.5 28.4   MCHC 30.9* 30.5* 31.3* 31.2*   RDW 22.8* 22.3* 21.9* 21.7*    203 201 212       INR  Recent Labs   Lab 04/22/23 0611 04/21/23 0353 04/20/23  0547 04/19/23  0457   INR 4.39* 2.89* 2.50* 2.27*       Time/Communication  I personally spent a total of 35 minutes. Of that 20 minutes was counseling/coordination of patient's care. Plan of care discussed with patient. See my note above for details.    Patient discussed with Dr. Tomas.        Francoise Peters DNP, NP-C  Advanced Heart Failure/Cardiology II Service  Pager [phone] [facility] [phone]

## 2023-04-23 NOTE — PLAN OF CARE
I/A: A/Ox4. VSS on RA. SR/ST w/ BBB. LVAD #'s WNL, no alarms, mepilex dressing CDI. Tylenol x1 and bc robaxin for cramping. Tolerating 2g Na diet w/ 2L FR. BG ACHS. Trace edema, compression stockings on. Voiding well. LBM 4/22. SBA/I. Worked with therapies.     Drips: Bumex gtt at 2 mg/hr via R SL PICC    P: Diuresing. Encourage activity.    Hours of care: 2917-0203

## 2023-04-24 NOTE — PLAN OF CARE
Pt is A/O x 4.   Independent.   VSS, RA. Denies  pain.   Tele NSR/ST at times.   Lung sounds clear throughout. Denies SOB.   Bumex gtt @ 2mg/hr.  LVAD site cleansed per orders. Still has scant purulent drainage. Dressing changed and CDI. No alarms this shift.  Skin: generalized bruising noted.  BS active x4. Adequate urine output via urinal.  BG checks ACHS. 2gm Na diet, tolerating well. 2L FR.   Plans to continue to diurese until euvolemic and plan RHC..   Patient currently resting in bed with call light in reach.

## 2023-04-24 NOTE — PROCEDURES
The patient's HeartMate LVAD was interrogated 4/24/2023  * Speed 5800 rpm   * Pulsatility index 3.1-3.3   * Power 4.8-5.0 Driver   * Flow 5.3-5.4 L/minute   Fluid status: hypervolemic, improving   Alarms were reviewed. No alarms. No PI events.   The driveline exit site was inspected, C/D/I.   All external components were inspected and showed no evidence of damage or malfunction, none replaced.   No changes to VAD settings made

## 2023-04-24 NOTE — PLAN OF CARE
I/A: A/Ox4. VSS on RA. SR/ST w/ BBB. LVAD #'s WNL, no alarms, mepilex dressing CDI. Tylenol x1 and bc robaxin for cramping. Tolerating 2g Na diet w/ 2L FR. BG ACHS. Trace edema, compression stockings on. Voiding well. LBM 4/23. SBA/I.     Drips: Bumex gtt at 2 mg/hr via R SL PICC    P: Diuresing. Encourage activity.    Hours of care: [id number]

## 2023-04-24 NOTE — PLAN OF CARE
Occupational Therapy Discharge Summary    Reason for therapy discharge:    All goals and outcomes met, no further needs identified.    Progress towards therapy goal(s). See goals on Care Plan in [facility] electronic health record for goal details.  OT goals met, cardiac rehab goals to be transferred to PT at this time to avoid duplication of services.    Therapy recommendation(s):    Pt would benefit from OP CR for continued progression of cardiopulmonary health. Will continue to receive IP CR from PT at this time.

## 2023-04-24 NOTE — PROGRESS NOTES
[facility]   Cardiology II Service / Advanced Heart Failure  Daily Progress Note      Patient: Eliseo Tanner  MRN: [id number]  Admission Date: 4/17/2023  Hospital Day # 7    Assessment and Plan: Eliseo Tanner is a 67 year old male with chronic systolic heart failure secondary to NICM (Stage D, NYHA Class IIIA)s/p HM 3 on 2/18/2020 moderate CAD, HTN, ABHINAV on CPAP, DM2, CKD Stage III, ANA. 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 He was admitted on 4/17/2023 for volume overload, recurrent recent admissions for HF.      Today's Plan:  - Bumex 2/hr, q12hr lytes. Metolazone 2.5 mg x 1 following K repletion.   - Kcl 40 meq IV per protocol, plus scheduled Kcl with repeat K midday  - When close to euvolemic, plan for echo speed opti +/- RHC (Wed?)       # Acute on chronic BiV systolic heart failure/HFrEF secondary to NICM    # s/p HM3 LVAD as DT on 2/2023  # Severe RV failure  Stage D. NYHA Class IV. Recurrent admissions for HF this year - like 2/2 refractory RV failure. Speed decreased from 6000rpm to 5800 rpm in 2/2023 when LVIdd 3.7cm, no AoV opening, and severe RV dysfunction.       Fluid status: hypervolemic, improving.  lb, Bumex 2 mg/hr gtt, with intermittent diuril/metolazone (pta torsemide 100 mg tid with IV Bumex daily as outpatient and hydrochlorothiazide 75 mg twice weekly)  ACEi/ARB/ARNi: contraindicated due to renal dysfunction  Afterload reduction: hydralazine 50 mg tid (decreased this admission), imdur 120 mg daily  BB contraindicated due to RV failure  Aldosterone antagonist: spironolactone 25 mg daily  SGLT2: farxiga 10 mg daily  Phosphodiesterase-3 Enzyme Inhibitor: Cilostazol 50 mg BID for possible benefits in improving RV dysfunction/PH, started per Dr. Tomas   SCD  prophylaxis ICD  MAP: at goal on above plus amlodipine 10 mg   LDH trends: 328, stable with intermittent monitoring   Anticoagulation: warfarin dosing per pharmacy, INR goal 1.7-2.3 due to recurrent epistaxis, today 1.83  Antiplatelet: aspirin 81 mg daily  Speed: 5800 rpm       # BERNARDA on CKD, cardiorenal, improved  # Hypervolemic hyponatremia   Cr 2.2 on arrival, stably elevated in last month, thought in setting of Bactrim which was discontinued. Prior baseline ~1.4. BERNARDA this admission likely in part cardiorenal syndrome. Urinalysis with microscopy bland.  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 Note: grown quinolone resistant organisms, but ID previously thought given stable clinical picture that okay to continue on cipro. Low threshold to consult ID.  - continue pta ciprofloxacin 750mg BID   - continue pta IV daptomycin 6mg q24h   - OP ID visit 4/14 with recs for CT A/P, curbside ID consult recommending to proceed with CT A/P. 4/23 CT A/P with stable minimal soft tissue thickening. No collection along the driveline site.   - Planning out of state travel 5/5-5/7, and again sometime in June. Per ID, okay to transition from IV dapto to PO abx for brief period of time. (Consider bactrim if renal fxn improved vs linezolid). Will need to follow up with ID OP.      # History of Heparin Induced Thrombocytopenia   - avoid heparin products      # Hypothyroidism   - pta levothyroxine 88 mcg qAM      # GERD   - pta pantoprazole every day      # Mood disorder   - pta paroxetine 20 mg every day      # Iron deficiency Anemia   - pta ferrous sulfate 325 every day-holding 2/2 to interaction with cipro (iron may reduce serum concentrations of quinolones).  - iron studies, sat 11%   - IV venofer 200 mg daily x5, completed.      # BPH   - pta finasteride 5 mg every day   - pta  "tamsulosin 0.8 mg qPM      # Nutritional Supplementation   - pta multivitamin (renalvite/nephrovite) every day      # T2DM   Most recent A1c 6.3 on 9/2/2022   - monitor   - hypoglycemia protocol      # Gout   - pta allopurinol      # Muscle spasms, acute on chronic due to Bumex gtt  - pta methocarbamol, scheduled        FEN: 2g Na 2L FR  PROPHY:  warfarin, PPI  LINES:  PICC   DISPO:  Pending diuresis, likely ~4 days  CODE STATUS:  Full code  ================================================================    Subjective/24-Hr Events:   Last 24 hr care team notes reviewed. No events overnight. Feeling well. Denies any pain or exertional symptoms. Denies orthopnea, PND, LE edema. Some abdominal distention, improving. No cramping with diuresis.     ROS:  4 point ROS including respiratory, CV, GI and  (other than that noted in the HPI) is negative.     Medications: Reviewed in EPIC.     Physical Exam:   /66   Pulse 112   Temp 97.6  F (36.4  C) (Oral)   Resp 18   Ht 1.702 m (5' 7\")   Wt 82.6 kg (182 lb 3.2 oz)   SpO2 94%   BMI 28.54 kg/m      GENERAL: Appears comfortable, in no distress .  HEENT: Eye symmetrical, no discharge or icterus bilaterally. Mucous membranes moist and without lesions.  NECK: Supple, JVD lower 1/3 of neck sitting upright at 90 degrees  CV: + LVAD hum  RESPIRATORY: Respirations regular, even, and unlabored. Lungs CTA throughout.    GI: Soft and non distended with normoactive bowel sounds present in all quadrants. No tenderness, rebound, guarding.   EXTREMITIES: 1+ peripheral edema bilateral ankles, 1+ pedal edema in left foot. 2+ bilateral pedal pulses.   NEUROLOGIC: Alert and oriented x 3. No focal deficits.   MUSCULOSKELETAL: No joint swelling or tenderness.   SKIN: No jaundice. No rashes or lesions.     Labs:  CMP  Recent Labs   Lab 04/24/23  0727 04/24/23  0542 04/23/23  2121 04/23/23  1716 04/23/23  1644 04/23/23  0737 04/23/23  0620 04/22/23  1747 04/22/23  1624   NA  --  132*  " --   --  130*  --  133*  --  130*   POTASSIUM  --  3.1*  --   --  4.3  --  2.9*  --  4.0  3.9   CHLORIDE  --  95*  --   --  95*  --  95*  --  92*   CO2  --  23  --   --  23  --  25  --  22   ANIONGAP  --  14  --   --  12  --  13  --  16*   * 154* 151* 141* 142*   < > 125*   < > 196*   BUN  --  62.4*  --   --  60.3*  --  60.3*  --  63.5*   CR  --  1.92*  --   --  1.88*  --  1.72*  --  1.94*   GFRESTIMATED  --  37*  --   --  38*  --  42*  --  37*   CALOS  --  8.2*  --   --  8.0*  --  8.3*  --  8.1*   MAG  --  2.1  --   --  1.8  --  1.8  --  1.8   PROTTOTAL  --   --   --   --   --   --  6.7  --   --    ALBUMIN  --   --   --   --   --   --  3.0*  --   --    BILITOTAL  --   --   --   --   --   --  0.6  --   --    ALKPHOS  --   --   --   --   --   --  188*  --   --    ALT  --   --   --   --   --   --  86*  --   --     < > = values in this interval not displayed.       CBC  Recent Labs   Lab 04/24/23  0542 04/23/23  0620 04/22/23  0611 04/21/23  0353   WBC 8.3 7.9 8.2 8.5   RBC 3.67* 3.74* 3.61* 3.68*   HGB 10.6* 10.7* 10.2* 10.5*   HCT 34.7* 34.6* 33.4* 33.5*   MCV 95 93 93 91   MCH 28.9 28.6 28.3 28.5   MCHC 30.5* 30.9* 30.5* 31.3*   RDW 23.3* 22.8* 22.3* 21.9*    210 203 201       INR  Recent Labs   Lab 04/24/23  0542 04/23/23  1037 04/23/23  0620 04/22/23  0611   INR 1.83* 2.44* 9.65* 4.39*       Time/Communication  I personally spent a total of 35 minutes. Of that 20 minutes was counseling/coordination of patient's care. Plan of care discussed with patient. See my note above for details.    Patient discussed with Dr. Tomas.        Francoise Peters DNP, NP-C  Advanced Heart Failure/Cardiology II Service  Pager [phone] [facility] [phone]

## 2023-04-24 NOTE — PLAN OF CARE
Admitted on 4/17/2023 for volume overload, recurrent recent admissions for HF. PMHx of   chronic systolic heart failure secondary to NICM s/p HM 3 on 2/18/2020, moderate CAD, HTN, ABHINAV on CPAP, DM2, CKD Stage III, ANA. 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 LVAD numbers WDL, MAPs WNL (70's-90's), other VSS  Respiratory: RA, CPAP when asleep. Sats > 92%  GI/: Voids spontaneously. BS audible and BM regular. LBM 4/23  Diet/Appetite: 2g Na diet with 2L FR  Skin: LVAD driveline site wound (Refer to wound care order). Skin tear on the R upper arm, covered with dressing. Generalized bruising  Endocrine/Electrolytes: BG checks ACHS with sliding scale coverage. Replace lytes per protocols (K and Mg on high replacement protocols). Monitor K levels due to diuresis.  LDAs: SL PICC infusing Bumex at 2 mg/hr. PIV saline locked  Activity: Up SBA  Pain: Denies pain    Plan: Continue POC per team. Possible RHC when euvolemic

## 2023-04-25 NOTE — PROGRESS NOTES
"CLINICAL NUTRITION SERVICES    Reviewed nutrition risk factors due to LOS. Pt is tolerating diet and eating adequately per nursing documentation (eating 100% of meals consistently with a good appetite per % intake flowsheets). Per discussion with pt this morning, reports good/adequate oral intake. Reviewed wt hx: 88.6 kg (3/20/20), 98 kg (3/8/2022), 89 kg (10/18/2022, 80.3 kg (2/14/2023), 80.2 kg (3/28/2023), 91.4 kg (4/17), 80.8 kg (4/25)- Diuresis this admission. Per RD note 9/2022, \"Wt is down this admit (218 lbs on 9/2-->183 lbs on 9/9), but he was admitted for refractory hypervolemia and is undergoing diuresis.\"     Follow Up / Monitoring:   Will continue to follow pt via rounds.    Abby Woods, MS, RD, LD, [facility]   6C/5A(beds 5201 - 5209) RD pgr: [phone]       "

## 2023-04-25 NOTE — PROGRESS NOTES
The patient's HeartMate LVAD was interrogated 4/25/2023  * Speed 5800 rpm   * Pulsatility index 3.6   * Power 4.8 Driver   * Flow 5.0 L/minute   Fluid status: hypervolemic   Alarms were reviewed, and notable for rare pi events, no alarms.   The driveline exit site was inspected, c/d/i.   All external components were inspected and showed no evidence of damage or malfunction, none replaced.   No changes to VAD settings made

## 2023-04-25 NOTE — PROGRESS NOTES
D:pt with 700cc input/ 500cc output  A:continue with bumex gtt  I:promote use of ice chips  P:per Primary

## 2023-04-25 NOTE — PROGRESS NOTES
[facility]   Cardiology II Service / Advanced Heart Failure  Daily Progress Note      Patient: Eliseo Tanner  MRN: [id number]  Admission Date: 4/17/2023  Hospital Day # 8    Assessment and Plan: Eliseo Tanner is a 67 year old male with chronic systolic heart failure secondary to NICM (Stage D, NYHA Class IIIA)s/p HM 3 on 2/18/2020 moderate CAD, HTN, ABHINAV on CPAP, DM2, CKD Stage III, ANA. 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 He was admitted on 4/17/2023 for volume overload, recurrent recent admissions for HF.      Today's Plan:  - Replace KCl  - Holding bumex pending K replacement, then Bumex 2/hr, q12hr lytes.  - When close to euvolemic, plan for echo speed opti +/- RHC (Thurs?)  - Ongoing discussion regarding prognosis and expectations in the setting of very high diuretic use as outpatient and still with very quick need for readmission d/t hypervolemia    # Acute on chronic BiV systolic heart failure/HFrEF secondary to NICM    # s/p HM3 LVAD as DT on 2/2023  # Severe RV failure  Stage D. NYHA Class IV. Recurrent admissions for HF this year - like 2/2 refractory RV failure. Speed decreased from 6000rpm to 5800 rpm in 2/2023 when LVIdd 3.7cm, no AoV opening, and severe RV dysfunction.       Fluid status: hypervolemic, improving. EDW unknown, Bumex 2 mg/hr gtt, with intermittent diuril/metolazone (pta torsemide 100 mg tid with IV Bumex twice weekly as outpatient and hydrochlorothiazide 75 mg twice weekly)  ACEi/ARB/ARNi: contraindicated due to renal dysfunction  Afterload reduction: hydralazine 50 mg tid (decreased this admission), imdur 120 mg daily  BB contraindicated due to RV failure  Aldosterone antagonist: spironolactone 25 mg daily  SGLT2: farxiga 10 mg daily  Phosphodiesterase-3  Enzyme Inhibitor: Cilostazol 50 mg BID for possible benefits in improving RV dysfunction/PH, started per Dr. Tomas   SCD prophylaxis ICD  MAP: at goal on above plus amlodipine 10 mg   LDH trends: 333, stable  Anticoagulation: warfarin dosing per pharmacy, INR goal 1.7-2.3 due to recurrent epistaxis, today 1.64, defer bridge  Antiplatelet: aspirin 81 mg daily  Speed: 5800 rpm     # BERNARDA on CKD, cardiorenal, improved  # Hypervolemic hyponatremia   Cr 2.2 on arrival, stably elevated in last month, thought in setting of Bactrim which was discontinued. Prior baseline ~1.4. BERNARDA this admission likely in part cardiorenal syndrome. Urinalysis with microscopy bland.  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 Note: grown quinolone resistant organisms, but ID previously thought given stable clinical picture that okay to continue on cipro. Low threshold to consult ID.  - continue pta ciprofloxacin 750mg BID   - continue pta IV daptomycin 6mg q24h, check CK 4/26  - 4/23 CT A/P with stable minimal soft tissue thickening. No collection along the driveline site.   - Planning out of state travel 5/5-5/7, and again sometime in June. Per ID, okay to transition from IV dapto to PO abx for brief period of time. (Consider bactrim if renal fxn improved vs linezolid). Will need to follow up with ID OP.      # History of Heparin Induced Thrombocytopenia   - avoid heparin products      # Hypothyroidism   - pta levothyroxine 88 mcg qAM      # GERD   - pta pantoprazole every day      # Mood disorder   - pta paroxetine 20 mg every day      # Iron deficiency Anemia   - pta ferrous sulfate 325 every day-holding 2/2 to interaction with cipro (iron may reduce serum concentrations of quinolones).  - iron studies, sat 11%   - IV venofer 200 mg daily x5, completed.      # BPH   - pta  "finasteride 5 mg every day   - pta tamsulosin 0.8 mg qPM      # Nutritional Supplementation   - pta multivitamin (renalvite/nephrovite) every day      # T2DM   Most recent A1c 6.3 on 9/2/2022   - monitor   - hypoglycemia protocol      # Gout   - pta allopurinol      # Muscle spasms, acute on chronic due to Bumex gtt  - pta methocarbamol, scheduled    # Hypokalemia  - Holding bumex today until this can be replaced  - Increased PO Kcl to 80 meq TID  - RN replacement protocol, high  - Recheck K at noon   - If Cr continues to improve, can consider increasing aldactone    # Abnormal LFTS  # ?Cirrhosis on CT  Will discuss next steps with Eliseo. Likely from chronic RV failure and refractory hypervolemia.  - Trend LFts daily to establish trends        FEN: 2g Na 2L FR  PROPHY:  warfarin, PPI  LINES:  PICC   DISPO:  Pending diuresis, likely >3 days  CODE STATUS:  Full code  ================================================================    Subjective/24-Hr Events:   Last 24 hr care team notes reviewed. No events overnight. Feeling well. Denies any pain or exertional symptoms. Denies orthopnea, PND. LE edema is greatly improved. Some abdominal distention, improving. No cramping with diuresis. Driveline drainage is unchanged. No bleeding symptoms. He has had 2 soft BMs today and had two soft BMs yesterday. No cramping or abd pain.    ROS:  4 point ROS including respiratory, CV, GI and  (other than that noted in the HPI) is negative.     Medications: Reviewed in EPIC.     Physical Exam:   BP (!) 76/68   Pulse 101   Temp 98.7  F (37.1  C) (Oral)   Resp 18   Ht 1.702 m (5' 7\")   Wt 80.8 kg (178 lb 1.6 oz)   SpO2 98%   BMI 27.89 kg/m      GENERAL: Appears comfortable, in no distress .  HEENT: Eye symmetrical, no discharge or icterus bilaterally.   NECK: Supple, JVD middle of neck neck sitting upright at 90 degrees  CV: + LVAD hum  RESPIRATORY: Respirations regular, even, and unlabored. Lungs CTA throughout.    GI: Soft " and non distended with normoactive bowel sounds present in all quadrants. No tenderness, rebound, guarding.   EXTREMITIES: 1+ peripheral edema bilateral ankles,  All extremities are warm and well perfused  NEUROLOGIC: Alert and interacting appropriatly No focal deficits.   MUSCULOSKELETAL: No joint swelling or tenderness.   SKIN: No jaundice. No rashes or lesions.     Labs:  CMP  Recent Labs   Lab 04/25/23  1129 04/25/23  0724 04/25/23  0609 04/24/23  2050 04/24/23  1743 04/24/23  1737 04/24/23  1152 04/24/23  1139 04/24/23  0727 04/24/23  0542 04/23/23  1716 04/23/23  1644 04/23/23  0737 04/23/23  0620   NA  --   --  131*  --   --  130*  --   --   --  132*  --  130*  --  133*   POTASSIUM  --   --  2.9*  --   --  3.8  --  3.6  --  3.1*  --  4.3  --  2.9*   CHLORIDE  --   --  93*  --   --  93*  --   --   --  95*  --  95*  --  95*   CO2  --   --  24  --   --  22  --   --   --  23  --  23  --  25   ANIONGAP  --   --  14  --   --  15  --   --   --  14  --  12  --  13   GLC 99 124* 107* 160*   < > 152*   < >  --    < > 154*   < > 142*   < > 125*   BUN  --   --  66.1*  --   --  64.0*  --   --   --  62.4*  --  60.3*  --  60.3*   CR  --   --  1.86*  --   --  1.89*  --   --   --  1.92*  --  1.88*  --  1.72*   GFRESTIMATED  --   --  39*  --   --  38*  --   --   --  37*  --  38*  --  42*   CALOS  --   --  8.5*  --   --  8.2*  --   --   --  8.2*  --  8.0*  --  8.3*   MAG  --   --  1.8  --   --  1.9  --   --   --  2.1  --  1.8  --  1.8   PROTTOTAL  --   --  6.8  --   --   --   --   --   --   --   --   --   --  6.7   ALBUMIN  --   --  3.0*  --   --   --   --   --   --   --   --   --   --  3.0*   BILITOTAL  --   --  0.7  --   --   --   --   --   --   --   --   --   --  0.6   ALKPHOS  --   --  188*  --   --   --   --   --   --   --   --   --   --  188*   AST  --   --  112*  --   --   --   --   --   --   --   --   --   --   --    ALT  --   --  77*  --   --   --   --   --   --   --   --   --   --  86*    < > = values in this interval  not displayed.       CBC  Recent Labs   Lab 04/25/23  0609 04/24/23  0542 04/23/23  0620 04/22/23  0611   WBC 7.6 8.3 7.9 8.2   RBC 3.49* 3.67* 3.74* 3.61*   HGB 10.3* 10.6* 10.7* 10.2*   HCT 32.8* 34.7* 34.6* 33.4*   MCV 94 95 93 93   MCH 29.5 28.9 28.6 28.3   MCHC 31.4* 30.5* 30.9* 30.5*   RDW 23.5* 23.3* 22.8* 22.3*    207 210 203       INR  Recent Labs   Lab 04/25/23  0609 04/24/23  0542 04/23/23  1037 04/23/23  0620   INR 1.64* 1.83* 2.44* 9.65*       Time/Communication  I personally spent a total of 40 minutes. Of that 20 minutes was counseling/coordination of patient's care. Plan of care discussed with patient. See my note above for details.    Patient discussed with Dr. Tomas.        Mervat Decker PA-C  Advanced Heart Failure/Cardiology II Service  Pager [phone] [facility] [phone]

## 2023-04-25 NOTE — PROGRESS NOTES
[facility]  WO Nurse Inpatient Assessment     Consulted for: LVAD site     Patient History (according to provider note(s):      Eliseo Tanner is a 67 year old male with a past medical history significant for chronic systolic heart failure secondary to NICM (Stage D, NYHA Class IIIA) now s/p HM 3 on 2/18/2020 moderate CAD, HTN, ABHINAV on CPAP, DM2, CKD Stage III, ANA. 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 He was admitted on 4/17/2023 for volume overload management.     Notably he has already had 2 prior admissions in 2023 for hypervolemia requiring IV diuresis, with a general decline in renal function over the last 4 months in the outpatient setting from a baseline of 1.5 to an approximate baseline of 2.1 and despite maximizing volume status by giving IV diuretics as an outpatient, he required readmission for fluid overload / worsening heart failure.      Areas Assessed:      Areas visualized during today's visit: Abdomen    Wound location: abdomen, around LVAD driveline     (photo from prior admission 3/24)  4/18      Last photo: 4/25  Wound due to: Surgical Wound  Wound history/plan of care: chronic LVAD driveline infection currently on Abx, s/p I&D a few months ago  Wound base: 100 % slough,     Palpation of the wound bed: firm      Drainage: moderate     Description of drainage: serosanguinous, cloudy and yellow     Measurements (length x width x depth, in cm): 1 x 0.5 x  0.2 cm      Tunneling: N/A     Undermining: N/A  Periwound skin: Scar tissue      Color: pink      Temperature: normal   Odor: none  Pain: denies , none  Pain interventions prior to dressing change: slow and gentle cares   Treatment goal: Infection control/prevention  STATUS: stable  Supplies ordered: gathered, at bedside and discussed with patient        Treatment Plan:     LVAD driveline wound(s):  Daily  remove soiled dressing and discard, apply Vashe (007895) soaked gauze over wound for a few minutes and remove, then use vashe moistened gauze to clean up any drainage around the tube as able, don sterile gloves per protocol, apply cut to fit hydrofera blue foam ([id number]) moistened with saline and ring out excess then apply over wound bed/around tube. Cover with two 4x4 mepilex. Keep tail end of tube secured with anchor device per protocol.      Orders: Reviewed    RECOMMEND PRIMARY TEAM ORDER: None, at this time  Education provided: plan of care  Discussed plan of care with: Patient and Nurse  [facility] nurse follow-up plan: weekly  Notify WOC if wound(s) deteriorate.  Nursing to notify the Provider(s) and re-consult the [facility] Nurse if new skin concern.    DATA:     Current support surface: Standard  Standard gel/foam mattress (IsoFlex, Atmos air, etc)  Containment of urine/stool: Continent of bladder and Continent of bowel  BMI: Body mass index is 27.89 kg/m .   Active diet order: Orders Placed This Encounter      2 Gram Sodium Diet     Output: I/O last 3 completed shifts:  In: 1018 [P.O.:918; I.V.:100]  Out: 4350 [Urine:4350]     Labs:   Recent Labs   Lab 04/25/23  0609   ALBUMIN 3.0*   HGB 10.3*   INR 1.64*   WBC 7.6     Pressure injury risk assessment:   Sensory Perception: 4-->no impairment  Moisture: 4-->rarely moist  Activity: 3-->walks occasionally  Mobility: 4-->no limitation  Nutrition: 3-->adequate  Friction and Shear: 3-->no apparent problem  Jens Score: 21      Pager no longer is use, please contact through [employer] group: [facility] Nurse  Dept. Office Number: [phone]

## 2023-04-25 NOTE — PLAN OF CARE
Dx: Admitted 4/17 for HF exacerbation     Neuro: A&O x4. [place] - has hearing aids.  Cardiac: ST BBB with r/. No LVAD alarm this AM. MAP WNL. weight down this AM  Respiratory: On RA. LS clear.   GI/: BM+ this shift. Voiding. Output up.   Diet: 2g Na+; 2L FR  Skin: No new skin deficit  Pain: Denied  Drips:  Bumex gtt continued at 2mg/hr to R SL PICC  LDAs: R PIV   Electrolytes: Await AM labs  Mobility: SBA/IND     Plan:  Continue to diurese until euvolemic. RHC with echo 4/26?    Goal Outcome Evaluation:      Plan of Care Reviewed With: patient    Overall Patient Progress: no changeOverall Patient Progress: no change

## 2023-04-26 NOTE — PLAN OF CARE
Goal Outcome Evaluation:  Pt is alert, oriented, and able to make needs met. LVAD dressing changed. Pt tolerated well. Mag and Potassium replaced orally. Pt requested to have food with his electrolytes. Will continue to assess.

## 2023-04-26 NOTE — PROGRESS NOTES
Care Management Follow Up    Length of Stay (days): 9    Expected Discharge Date: 04/28/2023     Concerns to be Addressed: goals of care, adjustment to illness, coping     Patient plan of care discussed at interdisciplinary rounds: Yes    Anticipated Discharge Disposition:  Home    Anticipated Discharge Services: resumption of outpatient IV diuresis plan      Anticipated Discharge DME:  None     Patient/family educated on Medicare website which has current facility and service quality ratings:  N/a   Education Provided on the Discharge Plan:    Patient/Family in Agreement with the Plan:      Referrals Placed by CM/SW:    Private pay costs discussed: Not applicable    Additional Information:  LVAD  continuing to follow. Collaborated with [employer] [facility]. Met w/ pt as part of a supportive visit. Pt is coping appropriately with hospitalization. Continue to have goals of care discussions with pt and that we continue to be concerned that his outpatient diuresis plan may not be enough and he will require ongoing re-hospitalizations. Pt continues to express that he wants to continue plan of care as and does not want de-escalate care. Asked pt if we ever get to a point that we have no more options for management of his fluid overload, how he would like to receive that information and he reported he would want to hear that from his team in a straight forward manner. Provided emotional support. Pt is eager to have RHC in coming days to help determine plan.     Pt has expressed interest in doing a HCD, however wants to wait until he is out of the hospital and at home to discuss with his wife.       VERÓNICA ChavesSW

## 2023-04-26 NOTE — PROGRESS NOTES
The patient's HeartMate LVAD was interrogated 4/26/2023  * Speed 5800 rpm   * Pulsatility index 3.6   * Power 4.8 Driver   * Flow 5.1 L/minute   Fluid status: hypervolemic   Alarms were reviewed, and notable for rare pi events, no alarms.   The driveline exit site was inspected, c/d/i.   All external components were inspected and showed no evidence of damage or malfunction, none replaced.   No changes to VAD settings made

## 2023-04-26 NOTE — PROGRESS NOTES
D: Stopped by to see patient. No VAD related questions or concerns at this time. Per pt, current concerns liver function concerns, nose bleeds and dark stools.  I: Discussed POC and provided support and listened to patient and caregiver's thoughts and concerns.  Caregiver on the phone.  Offered emotional support and active listening.  P: Continue to follow patient and address any questions or concerns patient and or caregiver may have.

## 2023-04-26 NOTE — PROGRESS NOTES
[facility]   Cardiology II Service / Advanced Heart Failure  Daily Progress Note      Patient: Eliseo Tanner  MRN: [id number]  Admission Date: 4/17/2023  Hospital Day # 9    Assessment and Plan: Eliseo Tanner is a 67 year old male with chronic systolic heart failure secondary to NICM (Stage D, NYHA Class IIIA)s/p HM 3 on 2/18/2020 moderate CAD, HTN, ABHINAV on CPAP, DM2, CKD Stage III, ANA. 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 He was admitted on 4/17/2023 for volume overload, recurrent recent admissions for HF.      Today's Plan:  - Bumex 2/hr, q12hr lytes  - Metolazone 2.5 today  - Increase Cilostazole to 100 mg BID  - Consider RHC with speed opti tomorrow pending volume status, will make NPO at MN in anticipation he might be ready for this tomorrow  - Ongoing discussion regarding prognosis and expectations in the setting of very high diuretic use as outpatient and still with very quick need for readmission d/t hypervolemia  - BID Hgb given soft, dark stool, needs to save next stool, RN aware    # Acute on chronic BiV systolic heart failure/HFrEF secondary to NICM    # s/p HM3 LVAD as DT on 2/2023  # Severe RV failure  Stage D. NYHA Class IV. Recurrent admissions for HF this year - like 2/2 refractory RV failure. Speed decreased from 6000rpm to 5800 rpm in 2/2023 when LVIdd 3.7cm, no AoV opening, and severe RV dysfunction.       Fluid status: hypervolemic, improving. EDW unknown, Bumex 2 mg/hr gtt, with intermittent diuril/metolazone (pta torsemide 100 mg tid with IV Bumex twice weekly as outpatient and hydrochlorothiazide 75 mg twice weekly)  ACEi/ARB/ARNi: contraindicated due to renal dysfunction  Afterload reduction: hydralazine 50 mg tid (decreased this admission), imdur 120 mg  daily  BB contraindicated due to RV failure  Aldosterone antagonist: spironolactone 25 mg daily  SGLT2: farxiga 10 mg daily  Phosphodiesterase-3 Enzyme Inhibitor: Cilostazol 100 mg BID for possible benefits in improving RV dysfunction/PH, started per Dr. Tomas   SCD prophylaxis ICD  MAP: at goal on above plus amlodipine 10 mg   LDH trends: 323, stable  Anticoagulation: warfarin dosing per pharmacy, INR goal 1.7-2.3 due to recurrent epistaxis, today 1.44, defer bridge  Antiplatelet: aspirin 81 mg daily  Speed: 5800 rpm     # BERNARDA on CKD, cardiorenal, improved  # Hypervolemic hyponatremia   Cr 2.2 on arrival, stably elevated in last month, thought in setting of Bactrim which was discontinued. Prior baseline ~1.4. BERNARDA this admission likely in part cardiorenal syndrome. Urinalysis with microscopy bland.  - Cr 1.97 (F 1.86)  - q12hr BMP  - avoid nephrotoxins to the extent possible, renally dose  - Na 133 today     # Chronic infection of drive line of LVAD , MSSA and PsA   No evidence of acute infection. Note: grown quinolone resistant organisms, but ID previously thought given stable clinical picture that okay to continue on cipro. Low threshold to consult ID.  - continue pta ciprofloxacin 750mg BID   - continue pta IV daptomycin 6mg q24h, CK on 4/26 is 62, stable  - 4/23 CT A/P with stable minimal soft tissue thickening. No collection along the driveline site.   - Planning out of state travel 5/5-5/7, and again sometime in June. Per ID, okay to transition from IV dapto to PO abx for brief period of time. (Consider bactrim if renal fxn improved vs linezolid). Will need to follow up with ID OP.      # History of Heparin Induced Thrombocytopenia   - avoid heparin products      # Hypothyroidism   - pta levothyroxine 88 mcg qAM      # GERD   - pta pantoprazole every day      # Mood disorder   - pta paroxetine 20 mg every day      # Iron deficiency Anemia   - pta ferrous sulfate 325 every day-holding 2/2 to interaction with  cipro (iron may reduce serum concentrations of quinolones).  - iron studies, sat 11%   - IV venofer 200 mg daily x5, completed.      # BPH   - pta finasteride 5 mg every day   - pta tamsulosin 0.8 mg qPM      # Nutritional Supplementation   - pta multivitamin (renalvite/nephrovite) every day      # T2DM   Most recent A1c 6.3 on 9/2/2022   - monitor   - hypoglycemia protocol      # Gout   - pta allopurinol      # Muscle spasms, acute on chronic due to Bumex gtt  - pta methocarbamol, scheduled    # Hypokalemia  - Increased PO Kcl to 80 meq TID (on 60 meq TID PTA_  - RN replacement protocol, high  - Recheck K at 2pm  - If Cr continues to improve, can consider increasing aldactone    # Abnormal LFTS  # ?Cirrhosis on CT  Discussed with patient and wife today. Mostly likely etiology is his chronic RV failure.  - F/up Hepatitis studies (Hep A IgG and IgM, Hep B core antibody, Hep B surface antigen, Hep C antibody and viral RNA and HIV)  - Will need outpatient GI follow-up    # GOC. Patient continues to have restorative goals of care. He would like to continue aggressive treatment. He has multiple trips that he would like to be able to go on this summer, and importantly his daughters wedding which is out of state in Sept 2023. Discussions with patient and his wife have indicated that the team is worried about his trajectory, but will do our best to meet these goals.    - Appreciate palliative care assistance  - Would potentially be an inotrope candidate from the cardiology perspective, but some ongoing challenges with insurance coverage  - Full Code        FEN: 2g Na 2L FR  PROPHY:  warfarin, PPI  LINES:  PICC   DISPO:  Pending diuresis, likely >3 days  CODE STATUS:  Full code  ================================================================    Subjective/24-Hr Events:   Last 24 hr care team notes reviewed. No events overnight. Feeling well. Denies any pain or exertional symptoms. Denies orthopnea, PND. LE edema is greatly  "improved. Some abdominal distention, improving. No cramping with diuresis. Driveline drainage is unchanged.      He has had 2 soft BMs today and had three soft BMs yesterday. Stools are darker. Not sure if they are tarry. Not sure if they look like prior GIB. He had a 15 minute nosebleed this morning.    ROS:  4 point ROS including respiratory, CV, GI and  (other than that noted in the HPI) is negative.     Medications: Reviewed in EPIC.     Physical Exam:   BP (!) 79/70   Pulse 98   Temp 98.8  F (37.1  C) (Oral)   Resp 16   Ht 1.702 m (5' 7\")   Wt 82.1 kg (181 lb)   SpO2 97%   BMI 28.35 kg/m      GENERAL: Appears comfortable, in no distress .  HEENT: Eye symmetrical, no discharge or icterus bilaterally.   NECK: Supple, JVD middle of neck neck sitting upright at 90 degrees  CV: + LVAD hum  RESPIRATORY: Respirations regular, even, and unlabored. Lungs CTA throughout.    GI: Soft and non distended with normoactive bowel sounds present in all quadrants. No tenderness, rebound, guarding.   EXTREMITIES: 1+ peripheral edema bilateral ankles,  All extremities are warm and well perfused  NEUROLOGIC: Alert and interacting appropriatly No focal deficits.   MUSCULOSKELETAL: No joint swelling or tenderness.   SKIN: No jaundice. No rashes or lesions.     Labs:  CMP  Recent Labs   Lab 04/26/23  1150 04/26/23  1149 04/26/23  0744 04/26/23  0601 04/25/23  2137 04/25/23  1724 04/25/23  1712 04/25/23  1334 04/25/23  0724 04/25/23  0609 04/24/23  1743 04/24/23  1737 04/23/23  0737 04/23/23  0620   NA  --   --   --  133*  --   --  129*  --   --  131*  --  130*   < > 133*   POTASSIUM 3.1*  --   --  3.2*  --   --  3.8 4.0  --  2.9*  --  3.8   < > 2.9*   CHLORIDE  --   --   --  96*  --   --  93*  --   --  93*  --  93*   < > 95*   CO2  --   --   --  24  --   --  23  --   --  24  --  22   < > 25   ANIONGAP  --   --   --  13  --   --  13  --   --  14  --  15   < > 13   GLC  --  138* 113* 110* 153*   < > 142*  --    < > 107*   < > " 152*   < > 125*   BUN  --   --   --  66.3*  --   --  65.5*  --   --  66.1*  --  64.0*   < > 60.3*   CR  --   --   --  1.97*  --   --  2.06*  --   --  1.86*  --  1.89*   < > 1.72*   GFRESTIMATED  --   --   --  36*  --   --  34*  --   --  39*  --  38*   < > 42*   CALOS  --   --   --  8.5*  --   --  8.5*  --   --  8.5*  --  8.2*   < > 8.3*   MAG  --   --   --  2.0  --   --  1.8 1.8  --  1.8  --  1.9   < > 1.8   PROTTOTAL  --   --   --   --   --   --   --   --   --  6.8  --   --   --  6.7   ALBUMIN  --   --   --   --   --   --   --   --   --  3.0*  --   --   --  3.0*   BILITOTAL  --   --   --   --   --   --   --   --   --  0.7  --   --   --  0.6   ALKPHOS  --   --   --   --   --   --   --   --   --  188*  --   --   --  188*   AST  --   --   --   --   --   --   --   --   --  112*  --   --   --   --    ALT  --   --   --   --   --   --   --   --   --  77*  --   --   --  86*    < > = values in this interval not displayed.       CBC  Recent Labs   Lab 04/26/23  0601 04/25/23  0609 04/24/23  0542 04/23/23  0620   WBC 7.8 7.6 8.3 7.9   RBC 3.69* 3.49* 3.67* 3.74*   HGB 10.9* 10.3* 10.6* 10.7*   HCT 34.7* 32.8* 34.7* 34.6*   MCV 94 94 95 93   MCH 29.5 29.5 28.9 28.6   MCHC 31.4* 31.4* 30.5* 30.9*   RDW 23.8* 23.5* 23.3* 22.8*    201 207 210       INR  Recent Labs   Lab 04/26/23  0601 04/25/23  0609 04/24/23  0542 04/23/23  1037   INR 1.44* 1.64* 1.83* 2.44*       Time/Communication  I personally spent a total of 50 minutes. Of that 30 minutes was counseling/coordination of patient's care. Plan of care discussed with patient. See my note above for details.    Patient discussed with Dr. Tomas.        Mervat Decker PA-C  Advanced Heart Failure/Cardiology II Service  Pager [phone] [facility] [phone]

## 2023-04-26 NOTE — PROGRESS NOTES
D/He is up and about in the room, and continues on IV ATB, VAD numbers are okay, K recheck was low, and given KCl. (He does NOT want KCL on empty stomach, so gave it to him with his meal).  A/making progress. Ambien for sleep per request. He takes pills sitting upright on side of bed  P/possible right heart cath in am

## 2023-04-26 NOTE — PLAN OF CARE
Dx: Admitted 4/17 for HF exacerbation     Neuro: A&O x4.   Cardiac: ST BBB with r/. No LVAD alarm this AM. MAP WNL. Weight is up this AM.  Respiratory: On RA. LS clear.   GI/: BM+ this shift. Voiding.  Diet: 2g Na+; 2L FR  Skin: No new skin deficit  Pain: Denied  Drips: Bumex gtt continued at 2mg/hr to R SL PICC  LDAs: R PIV   Electrolytes: Await AM labs  Mobility: SBA/IND     Plan:  Continue to diurese until euvolemic. RHC with echo 4/27?    Goal Outcome Evaluation:      Plan of Care Reviewed With: patient    Overall Patient Progress: no changeOverall Patient Progress: no change    Outcome Evaluation: Continue to replace electrolyte on bumex. Weight up today.

## 2023-04-27 NOTE — PLAN OF CARE
Hours of Care:  2300 - 0700     Temp:  [97.3  F (36.3  C)-98.8  F (37.1  C)] 97.9  F (36.6  C)  Pulse:  [] 103  Resp:  [16] 16  BP: (57-92)/(33-76) 78/33  Cuff Mean (mmHg):  [80-85] 85  SpO2:  [96 %-100 %] 96 %        D: 66 yo male admitted 4/17 for volume overload. History of chronic systolic HF, CAD, HTN, ABHINAV on home CPAP, CKD stage 3, ANA and Heart mate 3 complicated by retrosternal hemotoma and bleeding in the lungs.      Neuro: A/Ox4.  Call light appropriate.  Able to make needs known.  Respiratory:  On room air when awake, CPAP at night.  Lung sounds clear.  Denies shortness of breath at rest. .   Cardiac: VSS. V-paced. Coumadin with INR goal 1.7-2.3. Bumex drip.  GI: Last BM 4/26.  No report of nausea or vomiting.  : Urinating adequate amounts of clear, yellow urine  Skin:  See PCS for assessment and treatment of wounds and surgical incisions.  LDA:  PICC and PIV R, saline locked  Pain: Denies  Activity: None performed overnight.  Diet: NPO since midnight     P: Continue to monitor patient fluid status and report changes to Cards 2.

## 2023-04-27 NOTE — PROGRESS NOTES
[facility]   Cardiology II Service / Advanced Heart Failure  Daily Progress Note      Patient: Eliseo Tanner  MRN: [id number]  Admission Date: 4/17/2023  Hospital Day # 10    Assessment and Plan: Eliseo Tanner is a 67 year old male with chronic systolic heart failure secondary to NICM (Stage D, NYHA Class IIIA)s/p HM 3 on 2/18/2020 moderate CAD, HTN, ABHINAV on CPAP, DM2, CKD Stage III, ANA. 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 He was admitted on 4/17/2023 for volume overload, recurrent recent admissions for HF.      Today's Plan:  - Bumex 2/hr, q12hr lytes  - Metolazone 2.5 today  - Increased Cilostazole to 100 mg BID  - RHC with speed opti tomorrow pending volume status, will make NPO at MN  - BID Hgb given soft, dark stool, needs to save next stool, RN aware    # Acute on chronic BiV systolic heart failure/HFrEF secondary to NICM    # s/p HM3 LVAD as DT on 2/2023  # Severe RV failure  Stage D. NYHA Class IV. Recurrent admissions for HF this year - like 2/2 refractory RV failure. Speed decreased from 6000rpm to 5800 rpm in 2/2023 when LVIdd 3.7cm, no AoV opening, and severe RV dysfunction.       Fluid status: hypervolemic, improving. EDW unknown, Bumex 2 mg/hr gtt, with intermittent diuril/metolazone (pta torsemide 100 mg tid with IV Bumex twice weekly as outpatient and hydrochlorothiazide 75 mg twice weekly)  ACEi/ARB/ARNi: contraindicated due to renal dysfunction  Afterload reduction: hydralazine 50 mg tid (decreased this admission), imdur 120 mg daily  BB contraindicated due to RV failure  Aldosterone antagonist: spironolactone 25 mg daily  SGLT2: farxiga 10 mg daily  Phosphodiesterase-3 Enzyme Inhibitor: Cilostazol 100 mg BID for possible benefits in improving RV dysfunction/PH,  started per Dr. Tomas   SCD prophylaxis ICD  MAP: at goal on above plus amlodipine 10 mg   LDH trends: 316, stable  Anticoagulation: warfarin dosing per pharmacy, INR goal 1.7-2.3 due to recurrent epistaxis, today 1.47, defer bridge  Antiplatelet: aspirin 81 mg daily  Speed: 5800 rpm     # BERNARDA on CKD, cardiorenal, improved  # Hypervolemic hyponatremia   Cr 2.2 on arrival, stably elevated in last month, thought in setting of Bactrim which was discontinued. Prior baseline ~1.4. BERNARDA this admission likely in part cardiorenal syndrome. Urinalysis with microscopy bland.  - Cr 2.00 (F 1.97)  - q12hr BMP  - avoid nephrotoxins to the extent possible, renally dose  - Na 137 today     # Chronic infection of drive line of LVAD , MSSA and PsA   No evidence of acute infection. Note: grown quinolone resistant organisms, but ID previously thought given stable clinical picture that okay to continue on cipro. Low threshold to consult ID.  - continue pta ciprofloxacin 750mg BID   - continue pta IV daptomycin 6mg q24h, CK on 4/26 is 62, stable, check weekly  - 4/23 CT A/P with stable minimal soft tissue thickening. No collection along the driveline site.   - Planning out of state travel 5/5-5/7, and again sometime in June. Per ID, okay to transition from IV dapto to PO abx for brief period of time. (Consider bactrim if renal fxn improved vs linezolid). Will need to follow up with ID OP.      # History of Heparin Induced Thrombocytopenia   - avoid heparin products      # Hypothyroidism   - pta levothyroxine 88 mcg qAM      # GERD   - pta pantoprazole every day      # Mood disorder   - pta paroxetine 20 mg every day      # Iron deficiency Anemia. iron studies, sat 11%   - pta ferrous sulfate 325 every day-holding 2/2 to interaction with cipro (iron may reduce serum concentrations of quinolones).  - IV venofer 200 mg daily x5, completed.      # BPH   - pta finasteride 5 mg every day   - pta tamsulosin 0.8 mg qPM      # Nutritional  Supplementation   - pta multivitamin (renalvite/nephrovite) every day      # T2DM   Most recent A1c 6.3 on 9/2/2022   - monitor   - hypoglycemia protocol      # Gout   - pta allopurinol      # Muscle spasms, acute on chronic due to Bumex gtt  - pta methocarbamol, scheduled    # Hypokalemia  - Increased PO Kcl to 80 meq TID (on 60 meq TID PTA)  - RN replacement protocol, high  - Recheck K at 2pm  - If Cr continues to improve, can consider increasing aldactone    # Abnormal LFTS  # ?Cirrhosis on CT  Discussed with patient and wife today. Mostly likely etiology is his chronic RV failure.  - F/up Hepatitis studies (Hep A IgG and IgM, Hep B core antibody, Hep B surface antigen, Hep C antibody and viral RNA and HIV)  - Will need outpatient GI follow-up    # GOC. Patient continues to have restorative goals of care. He would like to continue aggressive treatment. He has multiple trips that he would like to be able to go on this summer, and importantly his daughters wedding which is out of state in Sept 2023. Discussions with patient and his wife have indicated that the team is worried about his trajectory, but will do our best to meet these goals.    - Appreciate palliative care assistance  - Would potentially be an inotrope candidate from the cardiology perspective, but some ongoing challenges with insurance coverage  - Full Code        FEN: 2g Na 2L FR  PROPHY:  warfarin, PPI  LINES:  PICC   DISPO:  Pending diuresis, likely >3 days  CODE STATUS:  Full code  ================================================================    Subjective/24-Hr Events:   Last 24 hr care team notes reviewed. No events overnight. Feeling well. Denies any pain or exertional symptoms. Denies orthopnea, PND. LE edema is greatly improved. He feels his abdominal swelling is resolved as well. No cramping with diuresis. Driveline drainage is unchanged.     He has had 1 soft BMs today and had three soft BMs yesterday. Stools are darker. Not sure if  "they are tarry. Not sure if they look like prior GIB.     ROS:  4 point ROS including respiratory, CV, GI and  (other than that noted in the HPI) is negative.     Medications: Reviewed in EPIC.     Physical Exam:   BP (!) 86/54 (BP Location: Left arm)   Pulse 102   Temp 97.4  F (36.3  C) (Oral)   Resp 18   Ht 1.702 m (5' 7\")   Wt 81.1 kg (178 lb 14.4 oz)   SpO2 96%   BMI 28.02 kg/m      GENERAL: Appears comfortable, in no distress .  HEENT: Eye symmetrical, no discharge or icterus bilaterally.   NECK: Supple, JVD middle of neck neck sitting upright at 90 degrees  CV: + LVAD hum  RESPIRATORY: Respirations regular, even, and unlabored. Lungs CTA throughout.    GI: Soft and non distended with normoactive bowel sounds present in all quadrants. No tenderness, rebound, guarding.   EXTREMITIES: 1+ peripheral edema bilateral ankles,  All extremities are warm and well perfused  NEUROLOGIC: Alert and interacting appropriatly No focal deficits.   MUSCULOSKELETAL: No joint swelling or tenderness.   SKIN: No jaundice. No rashes or lesions.     Labs:  CMP  Recent Labs   Lab 04/27/23  1109 04/27/23  0748 04/27/23  0708 04/26/23  2141 04/26/23  1821 04/26/23  1709 04/26/23  1431 04/26/23  1150 04/26/23  0744 04/26/23  0601 04/25/23  1724 04/25/23  1712 04/25/23  0724 04/25/23  0609 04/23/23  0737 04/23/23  0620   NA  --   --  137  --   --  138  --   --   --  133*  --  129*  --  131*   < > 133*   POTASSIUM  --   --  3.2*  --   --  4.4 3.6 3.1*  --  3.2*  --  3.8   < > 2.9*   < > 2.9*   CHLORIDE  --   --  101  --   --  102  --   --   --  96*  --  93*  --  93*   < > 95*   CO2  --   --  23  --   --  24  --   --   --  24  --  23  --  24   < > 25   ANIONGAP  --   --  13  --   --  12  --   --   --  13  --  13  --  14   < > 13   * 124* 129* 178*   < > 162*  --   --    < > 110*   < > 142*   < > 107*   < > 125*   BUN  --   --  67.1*  --   --  67.7*  --   --   --  66.3*  --  65.5*  --  66.1*   < > 60.3*   CR  --   --  2.00*  " --   --  1.89*  --   --   --  1.97*  --  2.06*  --  1.86*   < > 1.72*   GFRESTIMATED  --   --  35*  --   --  38*  --   --   --  36*  --  34*  --  39*   < > 42*   CALOS  --   --  8.6*  --   --  8.6*  --   --   --  8.5*  --  8.5*  --  8.5*   < > 8.3*   MAG  --   --  1.9  --   --  2.0  --   --   --  2.0  --  1.8   < > 1.8   < > 1.8   PROTTOTAL  --   --   --   --   --   --   --   --   --   --   --   --   --  6.8  --  6.7   ALBUMIN  --   --   --   --   --   --   --   --   --   --   --   --   --  3.0*  --  3.0*   BILITOTAL  --   --   --   --   --   --   --   --   --   --   --   --   --  0.7  --  0.6   ALKPHOS  --   --   --   --   --   --   --   --   --   --   --   --   --  188*  --  188*   AST  --   --   --   --   --   --   --   --   --   --   --   --   --  112*  --   --    ALT  --   --   --   --   --   --   --   --   --   --   --   --   --  77*  --  86*    < > = values in this interval not displayed.       CBC  Recent Labs   Lab 04/27/23  0708 04/26/23  1709 04/26/23  0601 04/25/23  0609 04/24/23  0542   WBC 8.6  --  7.8 7.6 8.3   RBC 3.68*  --  3.69* 3.49* 3.67*   HGB 10.8* 11.3* 10.9* 10.3* 10.6*   HCT 34.7*  --  34.7* 32.8* 34.7*   MCV 94  --  94 94 95   MCH 29.3  --  29.5 29.5 28.9   MCHC 31.1*  --  31.4* 31.4* 30.5*   RDW 24.0*  --  23.8* 23.5* 23.3*     --  209 201 207       INR  Recent Labs   Lab 04/27/23  0708 04/26/23  0601 04/25/23  0609 04/24/23  0542   INR 1.47* 1.44* 1.64* 1.83*       Time/Communication  I personally spent a total of 36 minutes. Of that 20 minutes was counseling/coordination of patient's care. Plan of care discussed with patient. See my note above for details.    Patient discussed with Dr. Tomas.        Mervat Decker PA-C  Advanced Heart Failure/Cardiology II Service  Pager [phone] [facility] [phone]

## 2023-04-27 NOTE — PLAN OF CARE
"D: 4/17 for volume overload, recurrent recent admission for HF    I: Monitored vitals and assessed pt status.   Changed:   Running: Bumex 2 mg/hr.   PRN:     A: A/o x4, VSS. MAP's In 70's. HM III WNL. RA, CPAP overnight. Voiding, last BM 4/27. Stool left for provider to assess. Driveline dressing changed per order. Scant purulent drainage. SL PICC infusing Bumex. PIV infusing TKO. Up ad tiffanie in the room. ACHS blood sugar checks.     Vitals: BP (!) 86/54 (BP Location: Left arm)   Pulse 102   Temp 97.4  F (36.3  C) (Oral)   Resp 18   Ht 1.702 m (5' 7\")   Wt 81.1 kg (178 lb 14.4 oz)   SpO2 96%   BMI 28.02 kg/m       P: Continue to monitor Pt status and report any significant changes to treatment team. NPO @ midnight for RHC in AM    "

## 2023-04-28 NOTE — PLAN OF CARE
Hours of Care:  1900 - 0700     Temp:  [97.4  F (36.3  C)-98.6  F (37  C)] 97.6  F (36.4  C)  Pulse:  [101-108] 101  Resp:  [16-18] 18  BP: (80-88)/(54-72) 86/59  Cuff Mean (mmHg):  [76] 76  SpO2:  [93 %-96 %] 95 %        D: 68 yo male admitted 4/17 for volume overload. History of chronic systolic HF, CAD, HTN, ABHINAV on home CPAP, CKD stage 3, ANA and Heart mate 3 complicated by retrosternal hemotoma and bleeding in the lungs.        Neuro: A/Ox4.  Call light appropriate.  Able to make needs known.  Respiratory:  On room air when awake, CPAP at night.  Lung sounds clear.  Denies shortness of breath at rest. .   Cardiac: VSS. V-paced. Coumadin with INR goal 1.7-2.3. Bumex drip.  GI: Last BM 4/26.  No report of nausea or vomiting.  : Urinating adequate amounts of clear, yellow urine  Skin:  See PCS for assessment and treatment of wounds and surgical incisions.  LDA:  PICC and PIV R, saline locked  Pain: Denies  Activity: Independent with walker  Diet: NPO since midnight     P: Continue to monitor patient fluid status and report changes to Cards 2.

## 2023-04-28 NOTE — PROGRESS NOTES
VAD coordinator present for VAD speed optimization echo.   VAD Coordinator adjusted speed, monitored VAD parameters and EKG, LV size, patient tolerance, and recorded findings according to Speed Optimization protocol. See Speed Optimization sheet for full results.   Parameters pre-optimization: Speed: 5800 rpm, Flow: 4.9 lpm, PI: 3.9, Power: 4.7 muhammad  Speed range evaluated: 5000 - 5800 rpm's.         Dr. Fischer notified that PI's not fluctuating as normal. Normal fluctuation would see an increase of  PI with the decreased speed. Log files will be sent to industry to further investigate.    See [facility] cardiology note for further details regarding additional information at each phase of procedure.

## 2023-04-28 NOTE — PROGRESS NOTES
[facility]   Cardiology II Service / Advanced Heart Failure  Daily Progress Note      Patient: Eliseo Tanner  MRN: [id number]  Admission Date: 4/17/2023  Hospital Day # 11    Assessment and Plan: Eliseo Tanner is a 67 year old male with chronic systolic heart failure secondary to NICM (Stage D, NYHA Class IIIA)s/p HM 3 on 2/18/2020 moderate CAD, HTN, ABHINAV on CPAP, DM2, CKD Stage III, ANA. 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 He was admitted on 4/17/2023 for volume overload, recurrent recent admissions for HF.      Today's Plan:  - RHC and ECHO for speed optimization today  - Will stop Bumex gtt today  - Ordered for home PO torsemide tomorrow: 100 mg TID and an increased dose of hydrochlorothiazide (100 mg three times a week)  - Increased Cilostazole to 100 mg BID  - C. Diff  - Right upper quadrant ultrasound in the AM to evaluate for reversible liver disease  - Hepatology consult    # Acute on chronic BiV systolic heart failure/HFrEF secondary to NICM    # s/p HM3 LVAD as DT on 2/2023  # Severe RV failure  Stage D. NYHA Class IV. Recurrent admissions for HF this year - like 2/2 refractory RV failure. Speed decreased from 6000rpm to 5800 rpm in 2/2023 when LVIdd 3.7cm, no AoV opening, and severe RV dysfunction.       Fluid status:euvolemic.  lbs based on RHC. Stop bumex gtt. Ordered for home PO torsemide tomorrow: 100 mg TID and an increased dose of hydrochlorothiazide (100 mg three times a week)  ACEi/ARB/ARNi: contraindicated due to renal dysfunction  Afterload reduction: hydralazine 50 mg tid (decreased this admission), imdur 120 mg daily, amlodipine 10 mg daily  BB contraindicated due to RV failure  Aldosterone antagonist: spironolactone 25 mg daily  SGLT2: farxiga 10 mg  daily  Phosphodiesterase-3 Enzyme Inhibitor: Cilostazol 100 mg BID for possible benefits in improving RV dysfunction/PH, started per Dr. Tomas   SCD prophylaxis ICD  MAP: at goal on above regimen  LDH trends: 285, stable  Anticoagulation: warfarin dosing per pharmacy, INR goal 1.7-2.3 due to recurrent epistaxis, today 1.58, defer bridge  Antiplatelet: aspirin 81 mg daily  Speed: 5800 rpm     # BERNARDA on CKD, cardiorenal, improved  # Hypervolemic hyponatremia   Cr 2.2 on arrival, stably elevated in last month, thought in setting of Bactrim which was discontinued. Prior baseline ~1.4. BERNARDA this admission likely in part cardiorenal syndrome. Urinalysis with microscopy bland.  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 Note: grown quinolone resistant organisms, but ID previously thought given stable clinical picture that okay to continue on cipro. Low threshold to consult ID.  - continue pta ciprofloxacin 750mg BID   - continue pta IV daptomycin 6mg q24h, CK on 4/26 is 62, stable, check weekly. Set up for Sunday infusion if he is able to discharge (would confirm with case management)  - 4/23 CT A/P with stable minimal soft tissue thickening. No collection along the driveline site.   - Planning out of state travel 5/5-5/7, and again sometime in June. Per ID, okay to transition from IV dapto to PO abx for brief period of time. (Consider bactrim if renal fxn improved vs linezolid). Will need to follow up with ID OP.   - Need to touch base with ID prior to discharge, was told he could go on PO abx for 3 days at a time when he travels out of state, need to clarify regimen as he leaves for [place] on Friday.    # Abnormal LFTS  # ?Cirrhosis on CT  Discussed with patient and wife today. Mostly likely etiology is his chronic RV failure. Hepatitis studies  negative  - Gi consult ordered for 4/29, clinical question is: is there reversible liver disease and associated work-up  - RUQ U/S 4/29 (NPO at [place] in anticipation of this test)    # Diarrhea. Not melena on exam. Now with watery diarrhea  - Added probiotic  - C. Diff test     # History of Heparin Induced Thrombocytopenia   - Avoid heparin products      # Hypothyroidism   - pta levothyroxine 88 mcg qAM   - Recheck TSH in the AM     # GERD   - pta pantoprazole every day      # Mood disorder   - pta paroxetine 20 mg every day      # Iron deficiency Anemia. iron studies, sat 11%   - pta ferrous sulfate 325 every day-holding 2/2 to interaction with cipro (iron may reduce serum concentrations of quinolones).  - IV venofer 200 mg daily x5, completed.      # BPH   - pta finasteride 5 mg every day   - pta tamsulosin 0.8 mg qPM      # Nutritional Supplementation   - pta multivitamin (renalvite/nephrovite) every day      # T2DM   Most recent A1c 6.3 on 9/2/2022   - monitor   - hypoglycemia protocol      # Gout   - pta allopurinol      # Muscle spasms, acute on chronic due to Bumex gtt  - pta methocarbamol, scheduled    # Hypokalemia  - Decreasing Kcl back to PTA 60 meq TID given going back to PO diuretics  - RN replacement protocol, high    # GOC. Patient continues to have restorative goals of care. He would like to continue aggressive treatment. He has multiple trips that he would like to be able to go on this summer, and importantly his daughters wedding which is out of state in Sept 2023. Discussions with patient and his wife have indicated that the team is worried about his trajectory, but will do our best to meet these goals.    - Appreciate palliative care assistance  - Would potentially be an inotrope candidate from the cardiology perspective, but some ongoing challenges with insurance coverage  - Full Code        FEN: 2g Na 2L FR  PROPHY:  warfarin, PPI  LINES:  PICC   DISPO:  Pending diuresis, likely >3 days  CODE  "STATUS:  Full code  ================================================================    Subjective/24-Hr Events:   Last 24 hr care team notes reviewed. No events overnight. Feeling well. Denies any pain. Denies orthopnea, PND. LE edema is greatly improved. He feels his abdominal swelling is resolved as well.  Driveline drainage is unchanged. HisBMs have changed from soft to watery. No pain.    ROS:  4 point ROS including respiratory, CV, GI and  (other than that noted in the HPI) is negative.     Medications: Reviewed in EPIC.     Physical Exam:   BP (!) 88/47   Pulse 102   Temp 97.8  F (36.6  C) (Oral)   Resp 18   Ht 1.702 m (5' 7\")   Wt 80.8 kg (178 lb 3.2 oz)   SpO2 95%   BMI 27.91 kg/m      GENERAL: Appears comfortable, in no distress .  HEENT: Eye symmetrical, no discharge or icterus bilaterally.   NECK: Supple, JVD lower third of neck neck sitting upright at 90 degrees  CV: + LVAD hum  RESPIRATORY: Respirations regular, even, and unlabored. Lungs CTA throughout.    GI: Soft and non distended with normoactive bowel sounds present in all quadrants. No tenderness, rebound, guarding.   EXTREMITIES:trace peripheral edema bilateral ankles,  All extremities are warm and well perfused  NEUROLOGIC: Alert and interacting appropriatly No focal deficits.   MUSCULOSKELETAL: No joint swelling or tenderness.   SKIN: No jaundice. No rashes or lesions.     Labs:  CMP  Recent Labs   Lab 04/28/23  1125 04/28/23  0603 04/27/23  2117 04/27/23  1841 04/27/23  1557 04/27/23  1554 04/27/23  1336 04/27/23  0748 04/27/23  0708 04/26/23  1821 04/26/23  1709 04/25/23  0724 04/25/23  0609 04/23/23  0737 04/23/23  0620   NA  --  132*  --   --   --  133*  --   --  137  --  138   < > 131*   < > 133*   POTASSIUM  --  4.1  --   --   --  4.8  4.8 4.9  --  3.2*  --  4.4   < > 2.9*   < > 2.9*   CHLORIDE  --  99  --   --   --  100  --   --  101  --  102   < > 93*   < > 95*   CO2  --  20*  --   --   --  19*  --   --  23  --  24   < > 24   " < > 25   ANIONGAP  --  13  --   --   --  14  --   --  13  --  12   < > 14   < > 13   * 126* 199* 127*   < > 170*  --    < > 129*   < > 162*   < > 107*   < > 125*   BUN  --  73.2*  --   --   --  71.2*  --   --  67.1*  --  67.7*   < > 66.1*   < > 60.3*   CR  --  2.31*  --   --   --  2.11*  --   --  2.00*  --  1.89*   < > 1.86*   < > 1.72*   GFRESTIMATED  --  30*  --   --   --  33*  --   --  35*  --  38*   < > 39*   < > 42*   CALOS  --  8.6*  --   --   --  8.7*  --   --  8.6*  --  8.6*   < > 8.5*   < > 8.3*   MAG  --  2.3  --   --   --  2.5*  --   --  1.9  --  2.0   < > 1.8   < > 1.8   PROTTOTAL  --   --   --   --   --   --   --   --   --   --   --   --  6.8  --  6.7   ALBUMIN  --  3.1*  --   --   --   --   --   --   --   --   --   --  3.0*  --  3.0*   BILITOTAL  --   --   --   --   --   --   --   --   --   --   --   --  0.7  --  0.6   ALKPHOS  --   --   --   --   --   --   --   --   --   --   --   --  188*  --  188*   AST  --   --   --   --   --   --   --   --   --   --   --   --  112*  --   --    ALT  --   --   --   --   --   --   --   --   --   --   --   --  77*  --  86*    < > = values in this interval not displayed.       CBC  Recent Labs   Lab 04/28/23  1047 04/28/23  1040 04/28/23  1033 04/28/23  1027 04/28/23  1013 04/28/23  0603 04/27/23  1634 04/27/23  0708 04/26/23  1709 04/26/23  0601 04/25/23  0609   WBC  --   --   --   --   --  7.9  --  8.6  --  7.8 7.6   RBC  --   --   --   --   --  3.44*  --  3.68*  --  3.69* 3.49*   HGB 9.5* 9.8* 9.6* 9.8*   < > 10.2*   < > 10.8*   < > 10.9* 10.3*   HCT  --   --   --   --   --  32.9*  --  34.7*  --  34.7* 32.8*   MCV  --   --   --   --   --  96  --  94  --  94 94   MCH  --   --   --   --   --  29.7  --  29.3  --  29.5 29.5   MCHC  --   --   --   --   --  31.0*  --  31.1*  --  31.4* 31.4*   RDW  --   --   --   --   --  23.9*  --  24.0*  --  23.8* 23.5*   PLT  --   --   --   --   --  210  --  223  --  209 201    < > = values in this interval not displayed.        INR  Recent Labs   Lab 04/28/23  0603 04/27/23  0708 04/26/23  0601 04/25/23  0609   INR 1.58* 1.47* 1.44* 1.64*       Time/Communication  I personally spent a total of 36 minutes. Of that 20 minutes was counseling/coordination of patient's care. Plan of care discussed with patient. See my note above for details.    Patient discussed with Dr. Tomas.        Mervat Decker PA-C  Advanced Heart Failure/Cardiology II Service  Pager [phone] [facility] [phone]

## 2023-04-28 NOTE — PROGRESS NOTES
Care Management Follow Up    Length of Stay (days): 11    Expected Discharge Date: 04/28/2023     Concerns to be Addressed:  Discharge planning    Patient plan of care discussed at interdisciplinary rounds: Yes    Anticipated Discharge Disposition:  Home     Anticipated Discharge Services:  Outpatient infusion    Additional Information:  In 6C Discharge Rounds it was reported plan is a right heart cath & echo today. May be ready for discharge tomorrow, pending heart cath results. If pt discharges on Sat, 4-29 then would need outpt IVs on Sunday, 4-30.   Left a voice message with [facility] to confirm they can continue outpt IVs. Did not receive a call back.   Pt on IV Dapto every 24 hours.       Makenzie Green, RN Care Coordinator  [facility]  On 4- RN[employer] coverage for [address]. Call [phone].         [facility] [place]   Phone: [phone]   Fax: [phone] (need to confirm, this is the same number as the phone number)

## 2023-04-29 NOTE — PROGRESS NOTES
Gucci Tomas M.D.  Cardiovascular Medicine    I personally saw and examined this patient, discussed care with housestaff and other consultants, reviewed current laboratories and imaging studies, and conveyed impression and diagnostic/therapeutic plan to patient.    Problem List  1. LVAD  2. Recurrent congestive heart failure  3. Diabetes  4. Right ventricular failure  5. Anemia with microcytosis  6. Hypothyroid on replacement  7. Iron deficiency  8. CKD acute and chronic  9. Nodular liver  10. Single chamber pacer AICD set to lower rate limit of 60  11. Anticoagulation    Plan:  1. Interrogate pacemaker for rhythms and histogram distribution    History  No events overnight. Nursing notes reviewed.    Objective      Wt Readings from Last 24 Encounters:   04/28/23 80.8 kg (178 lb 3.2 oz)   03/28/23 80.2 kg (176 lb 12.8 oz)   03/23/23 91.6 kg (201 lb 14.4 oz)   03/02/23 87.3 kg (192 lb 6.4 oz)   02/14/23 80.3 kg (177 lb 1.6 oz)   12/20/22 79.9 kg (176 lb 1.6 oz)   10/18/22 89 kg (196 lb 3.4 oz)   10/18/22 89 kg (196 lb 1.6 oz)   09/20/22 86.8 kg (191 lb 4.8 oz)   09/20/22 81.6 kg (180 lb)   09/11/22 80.9 kg (178 lb 4.8 oz)   06/24/22 99.3 kg (219 lb)   05/03/22 97.4 kg (214 lb 11.2 oz)   03/08/22 98 kg (216 lb)   02/14/22 92.5 kg (204 lb)   02/10/22 97.1 kg (214 lb)   02/10/22 98.1 kg (216 lb 3.2 oz)   02/08/22 92.5 kg (204 lb)   08/17/21 100.9 kg (222 lb 8 oz)   04/14/21 90.7 kg (200 lb)   04/06/21 89.8 kg (198 lb)   03/17/21 87.1 kg (192 lb 1.6 oz)   02/08/21 100.2 kg (221 lb)   01/08/21 94.6 kg (208 lb 8 oz)       Intake/Output Summary (Last 24 hours) at 4/28/2023 2152  Last data filed at 4/28/2023 2135  Gross per 24 hour   Intake 1679 ml   Output 2750 ml   Net -1071 ml       Meds    allopurinol  200 mg Oral Daily     amiodarone  200 mg Oral Daily     amLODIPine  10 mg Oral Daily     aspirin  81 mg Oral Daily     cilostazol  100 mg Oral BID AC     ciprofloxacin  500 mg Oral BID     dapagliflozin  10 mg Oral Daily      DAPTOmycin (CUBICIN) 500 mg in sodium chloride 0.9 % 100 mL intermittent infusion  6 mg/kg Intravenous Q24H     [Held by provider] ferrous sulfate  325 mg Oral Daily with breakfast     finasteride  5 mg Oral Daily     hydrALAZINE  50 mg Oral TID     [START ON 5/1/2023] hydrochlorothiazide  100 mg Oral Q Mon Wed Fri AM     insulin aspart  1-3 Units Subcutaneous TID AC     insulin aspart  1-3 Units Subcutaneous At Bedtime     isosorbide mononitrate  120 mg Oral Daily     lactobacillus rhamnosus (GG)  1 capsule Oral BID     levothyroxine  88 mcg Oral QAM AC     magnesium oxide  400 mg Oral BID     methocarbamol  500 mg Oral TID     multivitamin RENAL  1 tablet Oral Daily     pantoprazole  40 mg Oral Daily     PARoxetine  20 mg Oral Daily     potassium chloride  60 mEq Oral TID     spironolactone  25 mg Oral Daily     tamsulosin  0.8 mg Oral QPM     [START ON 4/29/2023] torsemide  100 mg Oral TID     Warfarin Therapy Reminder  1 each Oral See Admin Instructions       Labs  CMP  Recent Labs   Lab 04/28/23  2133 04/28/23  1747 04/28/23  1737 04/28/23  1125 04/28/23  0603 04/27/23  1557 04/27/23  1554 04/27/23  1336 04/27/23  0748 04/27/23  0708 04/25/23  0724 04/25/23  0609 04/23/23  0737 04/23/23  0620   NA  --   --  130*  --  132*  --  133*  --   --  137   < > 131*   < > 133*   POTASSIUM  --   --  4.0  --  4.1  --  4.8  4.8 4.9  --  3.2*   < > 2.9*   < > 2.9*   CHLORIDE  --   --  97*  --  99  --  100  --   --  101   < > 93*   < > 95*   CO2  --   --  18*  --  20*  --  19*  --   --  23   < > 24   < > 25   ANIONGAP  --   --  15  --  13  --  14  --   --  13   < > 14   < > 13   * 120* 120* 132* 126*   < > 170*  --    < > 129*   < > 107*   < > 125*   BUN  --   --  75.9*  --  73.2*  --  71.2*  --   --  67.1*   < > 66.1*   < > 60.3*   CR  --   --  2.44*  --  2.31*  --  2.11*  --   --  2.00*   < > 1.86*   < > 1.72*   GFRESTIMATED  --   --  28*  --  30*  --  33*  --   --  35*   < > 39*   < > 42*   CALOS  --   --  8.7*   --  8.6*  --  8.7*  --   --  8.6*   < > 8.5*   < > 8.3*   MAG  --   --  2.2  --  2.3  --  2.5*  --   --  1.9   < > 1.8   < > 1.8   PROTTOTAL  --   --   --   --   --   --   --   --   --   --   --  6.8  --  6.7   ALBUMIN  --   --   --   --  3.1*  --   --   --   --   --   --  3.0*  --  3.0*   BILITOTAL  --   --   --   --   --   --   --   --   --   --   --  0.7  --  0.6   ALKPHOS  --   --   --   --   --   --   --   --   --   --   --  188*  --  188*   AST  --   --   --   --   --   --   --   --   --   --   --  112*  --   --    ALT  --   --   --   --   --   --   --   --   --   --   --  77*  --  86*    < > = values in this interval not displayed.     CBC  Recent Labs   Lab 23  1047 23  1040 23  1033 23  1027 23  1013 23  0603 23  1634 23  0708 23  1709 23  0601 23  0609   WBC  --   --   --   --   --  7.9  --  8.6  --  7.8 7.6   RBC  --   --   --   --   --  3.44*  --  3.68*  --  3.69* 3.49*   HGB 9.5* 9.8* 9.6* 9.8*   < > 10.2*   < > 10.8*   < > 10.9* 10.3*   HCT  --   --   --   --   --  32.9*  --  34.7*  --  34.7* 32.8*   MCV  --   --   --   --   --  96  --  94  --  94 94   MCH  --   --   --   --   --  29.7  --  29.3  --  29.5 29.5   MCHC  --   --   --   --   --  31.0*  --  31.1*  --  31.4* 31.4*   RDW  --   --   --   --   --  23.9*  --  24.0*  --  23.8* 23.5*   PLT  --   --   --   --   --  210  --  223  --  209 201    < > = values in this interval not displayed.     INR  Recent Labs   Lab 23  0603 23  0708 23  0601 23  0609   INR 1.58* 1.47* 1.44* 1.64*     Imaging     Name: WOLFGANG LEACH  MRN: [id number]  : 1955  Study Date: 2023 09:38 AM  Age: 67 yrs  Gender: Male  Patient Location: [facility]  Reason For Study: Heart Failure - Left  Ordering Physician: VERONICA BORGES  Referring Physician: BRANNON GAVIN  Performed By: Antonette Corbin     BSA: 1.9 m2  Height: 67 in  Weight: 178  lb  ______________________________________________________________________________  Procedure  Complete Portable Echo Adult.  ______________________________________________________________________________  Interpretation Summary  Please refer to the EPIC report for measurements performed at different LVAD  speed settings.  HM3 at 5800RPM at baseline.  LAIDd 50mm.  Septum normal.  Aortic valve open with each systole.  ______________________________________________________________________________  Left Ventricle  Please refer to the EPIC report for measurements performed at different LVAD  speed settings. HM3 at 5800RPM at baseline.  LAIDd 50mm.  Septum normal.  Aortic valve open with each systole.  ______________________________________________________________________________  MMode/2D Measurements & Calculations  IVSd: 0.85 cm  LVIDd: 5.4 cm  LVIDs: 4.8 cm  LVPWd: 0.85 cm  FS: 10.6 %  LV mass(C)d: 164.5 grams  LV mass(C)dI: 85.5 grams/m2  RWT: 0.32     Name: WOLFGANG LEACH  MRN: [id number]  : 1955  Study Date: 2023 01:41 PM  Age: 67 yrs  Gender: Male  [place] [facility] [place]  Reason For Study: CHF  Ordering Physician: GABE JAIN  Performed By: Jose Ledbetter     BSA: 1.9 m2  Height: 67 in  Weight: 176 lb  HR: 61  BP: 96/84 mmHg  ______________________________________________________________________________  Procedure  Limited Portable Echo Adult. TDS - no apical window seen.  ______________________________________________________________________________  Interpretation Summary  HM3 at 6000RPM.  LVAD cannula was seen in the expected anatomic position in the LV apex.  The LV is normal in size LVIDd 38 mm.  Aortic valve remains closed during the cardiac cycle, mild aortic  regurgitation.  Normal inflow and outflow velocities.  Mild tricuspid insufficiency is present.  Global right ventricular function is severely reduced.  No pericardial effusion is  present.  ______________________________________________________________________________  Left Ventricle  Left ventricular function is decreased. The ejection fraction is 10-20%  (severely reduced).     Vessels  IVC diameter and respiratory changes fall into an intermediate range  suggesting an RA pressure of 8 mmHg.     Compared to Previous Study  This study was compared with the study from 2023 . LV cavity is smaller,  aortic valve remains closed during the cardiac cycle.     ______________________________________________________________________________  MMode/2D Measurements & Calculations  IVSd: 1.4 cm  LVIDd: 3.7 cm  LVPWd: 1.3 cm  LV mass(C)d: 181.2 grams  LV mass(C)dI: 94.6 grams/m2  RWT: 0.72     Doppler Measurements & Calculations  TR max saumya: 206.6 cm/sec  TR max P.1 mmHg     ______________________________________________________________________________  Report approved by: MD Virgilio Lujan 2023 02:34 PM       CT CHEST ABDOMEN PELVIS W/O CONTRAST 2023 2:49 PM     History: chronic driveline infection, assess for increased fluid  collection around driveline     Comparison: CT chest 3/24/2023.     Technique: Helical CT acquisition from the lung apices to the pubic  symphysis was obtained without intravenous contrast. Axial, coronal,  and sagittal reconstructions were obtained and reviewed.     Findings:   Devices: Left chest wall implantable cardiac defibrillator with leads  in the right atrium and right ventricle. LVAD in place with the normal  appearance of the driveline. Stable minimal soft tissue thickening at  the driveline exit site in the abdomen wall. No collection along the  drive line. The outflow tube is intact. Patency cannot be evaluated on  noncontrast exam.     CHEST:   Lungs: Stable mild diffuse chronic centrilobular groundglass  opacities. Stable small right pleural effusion. No pneumothorax,  pleural effusion, focal airspace opacity, or suspicious  pulmonary  nodule.  Airways: Central tracheobronchial tree is clear.  Vessels: Main pulmonary artery and aorta are normal in caliber. Normal  three-vessel arch.  Heart: Heart size is normal without pericardial effusion.  Lymph nodes: No suspicious mediastinal lymphadenopathy.  Thyroid: Within normal limits.  Esophagus: Within normal limits     ABDOMEN PELVIS:     Liver: Nodular hepatic surface. Trace perihepatic ascites. No focal  lesions. Biliary system: Punctate cholelithiasis without acute  cholecystitis. No intrahepatic or extrahepatic biliary ductal  dilatation.  Pancreas: Diffuse fatty atrophy of the pancreas. No focal mass or  dilation of the main pancreatic duct.  Stomach: Within normal limits.  Spleen: Within normal limits.  Adrenal glands: Within normal limits.  Kidneys: Atrophy kidneys. No focal mass, hydronephrosis, or stone.  Bladder: Within normal limits.  Reproductive organs: Within normal limits.  Colon: Within normal limits.  Small Bowel: Within normal limits. The appendix is normal.  Lymph nodes: No intra-abdominal or pelvic lymphadenopathy. Stable  prominent inguinal lymph nodes.  Vasculature: No aortic aneurysm.  Peritoneum: No intraabdominal free fluid or air.      Soft tissues: No suspicious soft tissue mass or fluid collection.   Bones: No acute osseous lesion. Intact sternotomy sutures.  Degenerative changes in the lumbar spine with retrolisthesis at L3-4  causing mild bilateral neural foraminal narrowing.                                                                      IMPRESSION:   1. LVAD in place with stable minimal soft tissue thickening at the  driveline exit site in the abdomen wall. No collection along the drive  line.    2. No acute abnormality in the chest, abdomen or pelvis.  3. Stable trace right pleural effusion. Stable chronic bilateral  centrilobular groundglass opacities likely representing smoking  associated respiratory bronchiolitis.  4. Mild nodular appearance of the  liver may indicate chronic  parenchymal disease. Recommend correlation with laboratory parameters  5. Mild ascites.

## 2023-04-29 NOTE — PROGRESS NOTES
Brief Care Coordination Note    Per chart review, patient could be medically ready for discharge over the weekend, would need to resume OP IV abx upon discharge.     Patient was receiving OP IV abx at [facility] in [place], [place]. Writer contacted [facility], left VM w/the pharmacist requesting call back to confirm pt can return for OP Infusions upon discharge and inquired if any orders are needed.     [facility]   Phone: [phone]   Fax: [phone] Addendum:  Writer contacted the main line w/[facility], spoke w/nurse Latha who is very familiar with the patient. They should be able to resume OP Daptomycin infusions but their pharmacist is not in until Monday morning to verify orders, she notes that they would be able to get this sorted out Monday morning and see the patient at his usual time (10am) for the infusion. Per chart review, patient is not medically ready for discharge today, but could be ready tomorrow. [facility] nurse, Latha, will be at the hospital tomorrow and available to help coordinate if needed, she can be reached by contacting the [facility] hospital line, number noted below. Weekend CC to f/u if needed.     [facility]  Direct line: [phone]    Ana Krueger, RNCC, BSN    [facility]    [address]  [address] [place] [id number]    [email or web address]  [email or web address]    Office: [phone] Pager: [phone]

## 2023-04-29 NOTE — PLAN OF CARE
"D: pt admitted on 4/17 with hypervolemia, driveline infection, and because of recurrent recent admissions for HF.  Contact precautions for MRSA, enteric precautions for C-Diff rule out.    I: Monitored vitals and assessed pt status.   Changes during this shift: Echo and RHC    Running: Bumex gtt @ 2mg/hr (8ml/hr)  PRN Med:      Vitals:  Blood pressure (!) 88/47, pulse 102, temperature 97.8  F (36.6  C), temperature source Oral, resp. rate 18, height 1.702 m (5' 7\"), weight 80.8 kg (178 lb 3.2 oz), SpO2 95 %.    A:   Neuro: Ax4 Denies Headache, dizziness,  Lightheadedness, numbness and tingling. calls appropriately   Cardiac: HM3 LVAD, numbers WNL, no alarms this shift. Sinus Tach,  Afebrile, VSS.  Denies chest pain.   Respiratory: sating >95 ON RA. denies SOB. LS clear bilaterally.   Diet/appetite: Cardiac Diet, 2L FR, Good appetite. NPO at midnight.  Endocrine: BS check ACHS. Pt on sliding scale  insulin   GI/:  2 loose BMs this shift. Denies abdominal pain. Good urine output using urinal.   Activity:  Moves independently  Pain: Denies  Skin: LVAD wound covered in mepilex, skin tear right upper arm, SL PICC infusing, RPIV SL.  Plan: Continue to monitor and follow plan of care. Notify Cards 2 of changes in patient status. Abdominal US tomorrow.    "

## 2023-04-29 NOTE — PLAN OF CARE
Eliseo Tanner is a 67 year old male with chronic systolic heart failure secondary to NICM (Stage D, NYHA Class IIIA)s/p HM 3 on 2/18/2020 moderate CAD, HTN, ABHINAV on CPAP, DM2, CKD Stage III, ANA. 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 He was admitted on 4/17/2023 for volume overload, recurrent recent admissions for HF    Temp: 97.8  F (36.6  C) Temp src: Axillary BP: 91/63 Pulse: 100   Resp: 20 SpO2: 97 % O2 Device: None (Room air)      Neuro: AOx4  Cardiac: SR/ST, LVAD HM III  Resp: RA, denies SOB  Diet: NPO for ultrasound in AM, otherwise Cardiac 2L FR  Endo: ACHS  LDA's: R SL PICC, R PIV   GI/: Voids spontaneously to bedside urinal, has loose stools  Activity: SBA  Pain: denies   Changes/events: Bumex was discontinued, NPO at 0000  Plan: Continue to monitor and contact Cards 2 with any questions/concerns

## 2023-04-29 NOTE — PROGRESS NOTES
I personally saw and examined this patient as well as spent 30 minutes in family discussion regarding further evaluation    1. RUQ demonstrates nodular liver texture and requires biopsy  2. Interrogate pacemaker to look at dominant rythms  3. Consider alternative pacemaker strategy

## 2023-04-29 NOTE — CONSULTS
"    HEPATOLOGY CONSULTATION      Date of Admission:  4/17/2023           Reason for Consultation:     We were asked by Mervat Decker of Cardiology to evaluate this patient with \"can be seen 4/29- Ct with concerns for cirrhosis, evaluating for reversible liver disease vs cirrhosis\"         ASSESSMENT AND RECOMMENDATIONS:     Assessment:    Eliseo Tanner is a 67 year old male with chronic systolic heart failure secondary to NICM (Stage D, NYHA Class IIIA)s/p HM 3 on 2/18/2020 moderate CAD, HTN, ABHINAV on CPAP, DM2, CKD Stage III, ANA. 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 He was admitted on 4/17/2023 for volume overload, recurrent recent admissions for HF. The heptology service is consulted to comment on the presence/absence of cirrhosis given nodular hepatic contour on recent imaging.     #. Nodular liver contour on imaging  #. Abnormal liver chemistries   #. Remote history of AUD  Most likely contributor to imaging findings and lab abnormalities would be congestive hepatopathy from cardiac back pressure. Added to this, previous history of heavy EtOH use likely lead to some degree of steatosis +/- fibrosis that was present prior to major cardiac insult. Pre-diabetic on last A1c so possible some contribution from MAFLD as well. Cannot evaluate synthetic dysfunction given warfarin therapy. Normal appearing spleen and normal platelets against presence of cirrhosis.      Recommendations:  -- if heart transplant evaluation would ever proceed, we would recommend IR-guided transjugular liver biopsy with portal pressure evaluations, though if he is felt not to be a candidate this procedure (and any diagnostic information therein) would not change our current recommendations  -- as an aside, if he does " undergo heart transplant evaluation he would also require up to date colorectal cancer cancer screening in the form of a colonoscopy (last 2012, due   -- monitor liver enzymes per primary team, no acute intervention required  -- primary management of congestive hepatopathy focuses on volume management and systolic support, both of which are already done  -- hepatology team will sign off at this time, please reach out with any questions or concerns      Gastroenterology outpatient follow up recommendations: TBD by hospital course.    Thank you for involving us in this patient's care. Please do not hesitate to contact the GI service with any questions or concerns.     Pt care plan discussed with Dr. Leventhal, GI staff physician.    Brant Law MD, PGY5  Gastroenterology Fellow  [facility]  See AMCOM/Qgenda for GI on-call information    ---------------------------------------------------------------------------------------------------------------------------------------------------------------------------------------------------------           History of Present Illness:     Eliseo Tanner is a 67 year old male with chronic systolic heart failure secondary to NICM (Stage D, NYHA Class IIIA)s/p HM 3 on 2/18/2020 moderate CAD, HTN, ABHINAV on CPAP, DM2, CKD Stage III, ANA. 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 He was admitted on 4/17/2023 for volume overload, recurrent recent admissions for HF. The heptology service is consulted to comment on the presence/absence of cirrhosis given nodular hepatic contour on recent imaging.     Patient joined today at the bedside by wife and granddaughter.     Briefly, patient denies any and all history of GI or hepatology illness in himself. Acknowledges  heavy EtOH use almost 30 years ago. Typically >6 drinks per night. No medical complications of use at that time.    Patient denies abdominal pain, nausea, vomiting, melena, hematochezia, hematemesis, easy bruising, itching, confusion, jaundice, HAs.    Does provide a history of anasarca, LE edema, and abdominal distension which has been attributed to his cardiac function/volume status and aggressively treated with loop diuretics.     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     All questions answered from hepatology perspective, patient comfortable with plan.          Data:   Key relevant labs:   Lab Results   Component Value Date    WBC 8.7 04/29/2023    HGB 10.1 (L) 04/29/2023    HCT 31.9 (L) 04/29/2023     04/29/2023     (L) 04/29/2023    POTASSIUM 4.5 04/29/2023    CHLORIDE 97 (L) 04/29/2023    CO2 17 (L) 04/29/2023    BUN 78.5 (H) 04/29/2023    CR 2.85 (H) 04/29/2023     (H) 04/29/2023    SED 49 (H) 12/08/2022    DD 2.71 (H) 10/18/2022    NTBNPI 2,572 (H) 04/17/2023    NTBNP 4,416 (H) 02/10/2022    TROPI 0.377 (HH) 02/15/2021     (H) 04/25/2023    ALT 77 (H) 04/25/2023    GGT 1,075 (H) 03/23/2023    ALKPHOS 188 (H) 04/25/2023    BILITOTAL 0.7 04/25/2023    INR 1.49 (H) 04/29/2023       Key relevant imaging:    CTAP: 4/17/23    IMPRESSION:   1. LVAD in place with stable minimal soft tissue thickening at the  driveline exit site in the abdomen wall. No collection along the drive  line.    2. No acute abnormality in the chest, abdomen or pelvis.  3. Stable trace right pleural effusion. Stable chronic bilateral  centrilobular groundglass opacities likely representing  smoking  associated respiratory bronchiolitis.  4. Mild nodular appearance of the liver may indicate chronic  parenchymal disease. Recommend correlation with laboratory parameters  5. Mild ascites.  6. Cholelithiasis without cholecystitis.     OSITO BRAVO MD       RUQ U/S: 3/23/23                                                                     Impression:      1. Patent hepatic vasculature by Doppler evaluation. Altered hepatic  waveform likely secondary to left ventricular assist device based  circulation.  2. Mild hepatomegaly with hepatic parenchymal echogenicity, which may  be seen with parenchymal liver disease including hepatic steatosis.  3. Cholelithiasis, mild gallbladder wall thickening, nonspecific,  trace pericholecystic fluid likely secondary to small ascites.  Negative sonographic Yoon's sign with no distention of the  gallbladder and similar gallbladder wall thickening on prior  ultrasound, acute cholecystitis is considered less likely. Few  nondependent tiny gallbladder wall non shadowing echogenicities may  represent polyp versus adenomyomatosis.          Previous Endoscopy:   Colonoscopy: last in 2012 in [facility], no records available for review         Medications:     Current Facility-Administered Medications   Medication     acetaminophen (TYLENOL) tablet 650 mg     allopurinol (ZYLOPRIM) tablet 200 mg     amiodarone (PACERONE) tablet 200 mg     amLODIPine (NORVASC) tablet 10 mg     aspirin EC tablet 81 mg     calcium carbonate (TUMS) chewable tablet 500 mg     cilostazol (PLETAL) tablet 100 mg     ciprofloxacin (CIPRO) tablet 500 mg     dapagliflozin (FARXIGA) tablet 10 mg     DAPTOmycin (CUBICIN) 500 mg in sodium chloride 0.9 % 100 mL intermittent infusion     glucose gel 15-30 g    Or     dextrose 50 % injection 25-50 mL    Or     glucagon injection 1 mg     [Held by provider] ferrous sulfate (FEROSUL) tablet 325 mg     finasteride (PROSCAR) tablet 5 mg     hydrALAZINE  "(APRESOLINE) tablet 50 mg     [START ON 5/1/2023] hydrochlorothiazide (HYDRODIURIL) tablet 100 mg     insulin aspart (NovoLOG) injection (RAPID ACTING)     insulin aspart (NovoLOG) injection (RAPID ACTING)     isosorbide mononitrate (IMDUR) 24 hr tablet 120 mg     lactobacillus rhamnosus (GG) (CULTURELL) capsule 1 capsule     levothyroxine (SYNTHROID/LEVOTHROID) tablet 88 mcg     magnesium oxide (MAG-OX) tablet 400 mg     methocarbamol (ROBAXIN) tablet 500 mg     multivitamin RENAL (RENAVITE RX/NEPHROVITE) tablet 1 tablet     pantoprazole (PROTONIX) EC tablet 40 mg     PARoxetine (PAXIL) tablet 20 mg     potassium chloride ER (KLOR-CON M) CR tablet 60 mEq     senna-docusate (SENOKOT-S/PERICOLACE) 8.6-50 MG per tablet 1 tablet     spironolactone (ALDACTONE) tablet 25 mg     tamsulosin (FLOMAX) capsule 0.8 mg     torsemide (DEMADEX) tablet 100 mg     Warfarin Dose Required Daily - Pharmacist Managed     zolpidem (AMBIEN) tablet 5 mg             Physical Exam:   BP 91/63 (BP Location: Left arm)   Pulse 100   Temp 97.8  F (36.6  C) (Axillary)   Resp 20   Ht 1.702 m (5' 7\")   Wt 84.1 kg (185 lb 8 oz)   SpO2 97%   BMI 29.05 kg/m    Wt:   Wt Readings from Last 2 Encounters:   04/29/23 84.1 kg (185 lb 8 oz)   03/28/23 80.2 kg (176 lb 12.8 oz)      Constitutional: cooperative, pleasant, not dyspneic/diaphoretic, no acute distress  Eyes: Sclera anicteric/injected  Ears/nose/mouth/throat: Normal oropharynx without ulcers or exudate, mucus membranes moist, hearing intact  Neck: supple, thyroid normal size  CV:  trace/lack of LE edema  Respiratory: Unlabored breathing  Abd: Nondistended, +bs, no hepatosplenomegaly, nontender, no peritoneal signs, LVAD driveline intact  Skin: warm, perfused, no jaundice  Neuro: AAO x 3, No asterixis  Psych: Normal affect  MSK: No gross deformities aside from right hand with known horse injury          Past Medical History:   Reviewed and edited as appropriate  Past Medical History: "   Diagnosis Date     Chronic systolic congestive heart failure (H)      History of implantable cardioverter-defibrillator (ICD) placement      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 [id number] and ;  Surgeon: Dalton Baeza MD;  Location: [facility] HEART CARDIAC CATH LAB     CV CENTRAL VENOUS CATHETER PLACEMENT N/A 02/13/2020    Procedure: Central Venous Catheter Placement;  Surgeon: Chente Moss MD;  Location: [facility] HEART CARD[facility]AC CATH LAB     CV INTRA AORTIC BALLOON N/A 02/07/2020    Procedure: Intra-Aortic Balloon Pump Insertion;  Surgeon: Jose Baldwin MD;  Location: [facility] HEART CARDIAC CATH LAB     CV INTRA AORTIC BALLOON N/A 02/13/2020    Procedure: Intra-Aortic Balloon Pump Insertion;  Surgeon: Chente Moss MD;  Location: [facility]ARD[facility]AC CATH LAB     CV RIGHT HEART CATH MEASUREMENTS RECORDED N/A 09/21/2020    Procedure: CV RIGHT HEART CATH;  Surgeon: Dalton Baeza MD;  Location: [facility]     CV RIGHT HEART CATH MEASUREMENTS RECORDED N/A 01/07/2021    Procedure: Right Heart Cath;  Surgeon: Chun Ball MD;  Location: [facility]     CV RIGHT HEART CATH MEASUREMENTS RECORDED N/A 02/10/2022    Procedure: CV RIGHT HEART CATH;  Surgeon: Dalton Baeza MD;  Location: [facility]ATH [facility]     CV RIGHT HEART CATH MEASUREMENTS  RECORDED N/A 09/20/2022    Procedure: Right Heart Catheterization [[id number]];  Surgeon: Dalton Baeza MD;  Location:  HEART CARDIAC CATH LAB     CV RIGHT HEART CATH MEASUREMENTS RECORDED N/A 12/12/2022    Procedure: Right Heart Cath;  Surgeon: Chente Moss MD;  Location:  HEART CARDIAC CATH LAB     CV SWAN LUCIANA PROCEDURE N/A 02/13/2020    Procedure: [place] Luc[place] Procedure;  Surgeon: Chente Moss MD;  Location:  HEART CARDIAC CATH LAB     EP ICD Bilateral 03/16/2020    Procedure: EP ICD;  Surgeon: Dali Day MD;  Location:  HEART CARDIAC CATH LAB     INCISION AND DRAINAGE CHEST WASHOUT, COMBINED N/A 12/11/2022    Procedure: INCISION AND DRAINAGE OF DRIVELINE;  Surgeon: Mac Jaramillo MD;  Location: [facility] OR     INSERT VENTRICULAR ASSIST DEVICE LEFT (HEARTMATE II) N/A 02/18/2020    Procedure: INSERTION, LEFT VENTRICULAR ASSIST DEVICE (HEARTMATE III);  Surgeon: Mac Jaramillo MD;  Location:  OR     IR CVC TUNNEL PLACEMENT > 5 YRS OF AGE  02/23/2021     IR CVC TUNNEL REMOVAL RIGHT  03/16/2021     MIDLINE INSERTION - DOUBLE LUMEN Left 12/15/2022    left basilic 5 fr dl midline 20 cm     THORACOSCOPY Right 03/06/2020    Procedure: RIGHT VIDEO-ASSISTED THORASCOPIC SURGERY, EVACUATION OF HEMOTHORAX, PLACEMENT OF CHEST TUBES;  Surgeon: William Gan MD;  Location:  OR            Social History:   Alcohol use: heavy from age [age], sober since  Smoking history: quit at age 38  Living situation: in a home with wife         Family History:   Reviewed and edited as appropriate  No family history on file.  No known history of colorectal cancer, liver disease, or inflammatory bowel disease.         Allergies:   Reviewed and edited as appropriate     Allergies   Allergen Reactions     Heparin      HIT screen positive 2/14/20, reflex DAVINA negative; however heme recommended treating as if positive  HIT screen negative 2/11/20     Oxycodone Itching and Other (See  Comments)     Chlorhexidine Rash          Review of Systems:     A complete 10 point review of systems was performed and is negative except as noted in the HPI

## 2023-04-29 NOTE — PROGRESS NOTES
[facility]   Cardiology II Service / Advanced Heart Failure  Daily Progress Note  Date of Service: 4/29/2023      Patient: Eliseo Tanner  MRN: [id number]  Admission Date: 4/17/2023  Hospital Day # 12    Assessment and Plan: Eliseo Tanner is a 67 year old male with chronic systolic heart failure secondary to NICM (Stage D, NYHA Class IIIA)s/p HM 3 on 2/18/2020 moderate CAD, HTN, ABHINAV on CPAP, DM2, CKD Stage III, ANA. 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 He was admitted on 4/17/2023 for refractory hypervolemia.     Plan today:   -Bumex 5 mg IV times one  - Imodium prn.   - Increase Levothyroxine.     Acute on Chronic BV Systolic Heart Failure secondary to NICM s/p HM III LVAD. 2/23. Severe RV failure. Echo 4/28/23 with septum normal, AV opening with each systole, and LVIDd 5.4 cm. RHC at 5800 rpm 4/28/23 consistent with mRA-12, mPCWP-12, JOSE Co-7, JOSE CI-3.6-note with drop in LVAD speed no significant improvement in RA with increase in wedge.   Fluid status: hypervolemic. .  lbs based on RHC. Torsemide 100 mg TID. hydrochlorothiazide 100 mg MWF. Bumex 5 mg IV times one. Prior auth for Subcutaneous lasix pending.   ACEi/ARB/ARNi: contraindicated due to renal dysfunction  Afterload reduction: hydralazine 50 mg tid (decreased this admission), imdur 120 mg daily, amlodipine 10 mg daily  BB contraindicated due to RV failure  Aldosterone antagonist: Discontinue Aldactone.   SGLT2: Change Farxiga to Jardiance 10 mg po daily due to renal function.   Phosphodiesterase-3 Enzyme Inhibitor: Cilostazol 100 mg BID for possible benefits in improving RV dysfunction/PH, started per Dr. Tomas   SCD prophylaxis ICD  MAP: 72  LDH trends: 283  Anticoagulation: warfarin dosing per pharmacy, INR  goal 1.7-2.3 due to recurrent epistaxis, today 1.49, defer bridge  Antiplatelet: aspirin 81 mg daily  - continue Pletal 100 mg po BID  - Appreciate Palliative care consult.     The patient's HeartMate LVAD was interrogated 4/29/2023  * Speed 5800 rpm   * Pulsatility index 3.7   * Power 5 Driver   * Flow 4.9 L/minute   Fluid status: hypervolemic   Alarms were reviewed, and no acute findings.   The driveline exit site was covered with dressing clean, dry, and intact.   All external components were inspected and showed no evidence of damage or malfunction.    BERNARDA on CKD Stage IV.   - Cr- 2.85 (2.44).   - Stop Aldactone  - Change Farxiga to Jardiance.     Chronic LVAD drive line infection, 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 4/23 CT A/P with stable minimal soft tissue thickening. No collection along the driveline site.   - Continue cipro and Daptomycin.    Transaminitis. Mild nodular appearance of the liver with mild ascites on CT 4/23/23.  - US consistent with Increased echogenicity of the hepatic parenchyma with mild coarsened echotexture and questionable nodularity which may represent chronic parenchymal liver disease.   - Appreciate Hepatology consult.     Hypothyroidism.   - TSH-7.78, Free T4-0.76.   - Increase Levothyroxine to 100 mcg daily.     Diarrhea. CDIF negative.   - Imodium prn.     Chronic Medical Conditions:   BPH. Continue PTA meds.   Gout. Continue Allopurinol.   BELA. Completed Venofer.   DM Type II, controlled.   Mood Disorder. Continue Paxil.     FEN: 2 gram sodium diet   PROPHY:  Coumadin   LINES:  PICC  DISPO: Potential discharge to home in AM   CODE STATUS:  Full Code   ================================================================    Interval History/ROS: He complains of weight gain, abdominal bloating, and increased LE edema. He denies fever, chills, orthopnea, MIRANDA, cough, nausea, vomiting,  "hematochezia, melena, or increased drive line drainage. He is eager to order breakfast.     Last 24 hr care team notes reviewed.   ROS:  4 point ROS including Respiratory, CV, GI and , other than that noted in the HPI, is negative.     Medications: Reviewed in EPIC.     Physical Exam:   BP (!) 76/62 (BP Location: Left arm)   Pulse 99   Temp 97.8  F (36.6  C) (Oral)   Resp 18   Ht 1.702 m (5' 7\")   Wt 84.1 kg (185 lb 8 oz)   SpO2 100%   BMI 29.05 kg/m    GENERAL: Appears alert and oriented times three.   HEENT: Eye symmetrical and free of discharge bilaterally. Mucous membranes moist and without lesions.  NECK: Supple and without lymphadenopathy. JVD mid neck.   CV: RRR, S1S2 present with LVAD hum  RESPIRATORY: Respirations regular, even, and unlabored. Lungs CTA throughout.   GI: Soft and distended with normoactive bowel sounds present in all quadrants. No tenderness, rebound, guarding. No organomegaly.   EXTREMITIES: No peripheral edema. 2+ bilateral pedal pulses.   NEUROLOGIC: Alert and orientated x 3. CN II-XII grossly intact. No focal deficits.   MUSCULOSKELETAL: No joint swelling or tenderness.   SKIN: No jaundice. No rashes or lesions. LVAD drive line covered.     Data:  CMPRecent Labs   Lab 04/29/23  1114 04/29/23  0812 04/29/23  0503 04/28/23  2133 04/28/23  1747 04/28/23  1737 04/28/23  1125 04/28/23  0603 04/27/23  1557 04/27/23  1554 04/25/23  0724 04/25/23  0609 04/23/23  0737 04/23/23  0620   NA  --   --  128*  --   --  130*  --  132*  --  133*   < > 131*   < > 133*   POTASSIUM  --   --  4.5  --   --  4.0  --  4.1  --  4.8  4.8   < > 2.9*   < > 2.9*   CHLORIDE  --   --  97*  --   --  97*  --  99  --  100   < > 93*   < > 95*   CO2  --   --  17*  --   --  18*  --  20*  --  19*   < > 24   < > 25   ANIONGAP  --   --  14  --   --  15  --  13  --  14   < > 14   < > 13   * 131* 139* 180*   < > 120*   < > 126*   < > 170*   < > 107*   < > 125*   BUN  --   --  78.5*  --   --  75.9*  --  73.2*  -- "  71.2*   < > 66.1*   < > 60.3*   CR  --   --  2.85*  --   --  2.44*  --  2.31*  --  2.11*   < > 1.86*   < > 1.72*   GFRESTIMATED  --   --  23*  --   --  28*  --  30*  --  33*   < > 39*   < > 42*   CALOS  --   --  8.3*  --   --  8.7*  --  8.6*  --  8.7*   < > 8.5*   < > 8.3*   MAG  --   --  2.1  --   --  2.2  --  2.3  --  2.5*   < > 1.8   < > 1.8   PROTTOTAL  --   --   --   --   --   --   --   --   --   --   --  6.8  --  6.7   ALBUMIN  --   --   --   --   --   --   --  3.1*  --   --   --  3.0*  --  3.0*   BILITOTAL  --   --   --   --   --   --   --   --   --   --   --  0.7  --  0.6   ALKPHOS  --   --   --   --   --   --   --   --   --   --   --  188*  --  188*   AST  --   --   --   --   --   --   --   --   --   --   --  112*  --   --    ALT  --   --   --   --   --   --   --   --   --   --   --  77*  --  86*    < > = values in this interval not displayed.     CBC  Recent Labs   Lab 04/29/23  0503 04/28/23  1047 04/28/23  1040 04/28/23  1033 04/28/23  1013 04/28/23  0603 04/27/23  1634 04/27/23  0708 04/26/23  1709 04/26/23  0601   WBC 8.7  --   --   --   --  7.9  --  8.6  --  7.8   RBC 3.35*  --   --   --   --  3.44*  --  3.68*  --  3.69*   HGB 10.1* 9.5* 9.8* 9.6*   < > 10.2*   < > 10.8*   < > 10.9*   HCT 31.9*  --   --   --   --  32.9*  --  34.7*  --  34.7*   MCV 95  --   --   --   --  96  --  94  --  94   MCH 30.1  --   --   --   --  29.7  --  29.3  --  29.5   MCHC 31.7  --   --   --   --  31.0*  --  31.1*  --  31.4*   RDW 23.8*  --   --   --   --  23.9*  --  24.0*  --  23.8*     --   --   --   --  210  --  223  --  209    < > = values in this interval not displayed.     INR  Recent Labs   Lab 04/29/23  0503 04/28/23  0603 04/27/23  0708 04/26/23  0601   INR 1.49* 1.58* 1.47* 1.44*       Time/Communication  I personally spent a total of 35 minutes. Of that 20 minutes was counseling/coordination of patient's care. Plan of care discussed with patient. See my note above for details. Patient discussed with   Thom.      Laine Leon [facility]  4/29/2023

## 2023-04-29 NOTE — PROGRESS NOTES
The patient's HeartMate LVAD was interrogated 4/28/2023  * Speed 5800 rpm   * Pulsatility index 2.3   * Power 4.6 Driver   * Flow 5.2 L/minute   Fluid status: euvolemic   Alarms were reviewed, and notable for occasional pi events, no alarms.   The driveline exit site was inspected, c/d/i.   All external components were inspected and showed no evidence of damage or malfunction, none replaced.   No changes to VAD settings made

## 2023-04-29 NOTE — PROGRESS NOTES
[id number] 4/29/2023    Patient is a 67 year old male admitted for hypervolemia with a PMH of chronic systolic heart failure secondary to NICM (Stage D, NYHA Class IIIA) s/p HM 3 on 2/18/2020 moderate CAD, HTN, ABHINAV on CPAP, DM2, CKD Stage III, ANA. 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 He was admitted on 4/17/2023 for volume overload, recurrent recent admissions for HF. Team is cards 2.    Neuro: A&Ox4; occasional bloody nose today, IA Sandoval spray  Cardiac: SR; heartmate 3 LVAD; K+ and Mg protocols; denies chest pain  Respiratory: WDL; on room air; no cough  GI/: Irregular shaped abdomen, left side lower quadrant swollen; bowel sounds active; PRN imodium for loose stools  Endocrine: BS ACHS  Diet/Appetite: Low sodium diet; 2L fluid restriction  Skin: Healed skin tear near PICC line, MISTI; drive line infection  LDA: Right single lumen PICC; left PIV  Activity: Independent  Pain: No pain issues reported  Plan: Keep team updated    - Abdominal ultrasound today; abnormal liver anatomy  - Needs liver biopsy and pacemaker interrogation before discharge

## 2023-04-30 NOTE — PROGRESS NOTES
[id number] 4/30/2023     Patient is a 67 year old male admitted for hypervolemia with a PMH of chronic systolic heart failure secondary to NICM (Stage D, NYHA Class IIIA) s/p HM 3 on 2/18/2020 moderate CAD, HTN, ABHINAV on CPAP, DM2, CKD Stage III, ANA. 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 He was admitted on 4/17/2023 for volume overload, recurrent recent admissions for HF. Team is cards 2.     Neuro: A&Ox4; intermittent bloody nose today  Cardiac: SR; heartmate 3 LVAD; K+ and Mg protocols (replaced K+, team said to not replace Mg per protocol); denies chest pain; coumadin on hold  Respiratory: WDL; on room air; no cough  GI/: Irregular shaped abdomen; bowel sounds active  Endocrine: BS ACHS  Diet/Appetite: Low sodium diet; 2L fluid restriction   - NPO at midnight  Skin: Healed skin tear near PICC line, MISTI; drive line infection  LDA: Right single lumen PICC; left PIV  Activity: Independent  Pain: No pain issues reported  Plan: Keep team updated; liver biopsy tomorrow      - May need a pacemaker interrogation before discharge    - Possible kidney transplant soon depending on liver biopsy results

## 2023-04-30 NOTE — PLAN OF CARE
Eliseo Tanner is a 67 year old male with chronic systolic heart failure secondary to NICM (Stage D, NYHA Class IIIA)s/p HM 3 on 2/18/2020 moderate CAD, HTN, ABHINAV on CPAP, DM2, CKD Stage III, ANA. 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 He was admitted on 4/17/2023 for volume overload, recurrent recent admissions for HF    Temp: 97.6  F (36.4  C) Temp src: Oral BP: (!) 72/60 Pulse: 97   Resp: 18 SpO2: 96 % O2 Device: None (Room air)       Neuro: AOx4   Cardiac: SR w/BBB occasionally paced   Resp: RA, denies SOB  LDA's: R SL PICC, R PIV  Diet: 2g Na, 2L FR  Endo: ACHS  GI/: Voids spontaneously, no BM this shift  Activity: SBA  Pain: Denies  Skin: Bruising on L and R arms  Changes: Na levels trending down, K+ trending up, GFR trending down. @ EKG's were ordered by provider r/t  K+ levels. Pt getting BMP drawn 2x day  Plan: Continue to monitor and contact Cards 2 with any changes/concerns

## 2023-04-30 NOTE — PROGRESS NOTES
[facility]   Cardiology II Service / Advanced Heart Failure  Daily Progress Note  Date of Service: 4/30/2023      Patient: Eliseo Tanner  MRN: [id number]  Admission Date: 4/17/2023  Hospital Day # 13     I personally saw and examined patient and reviewed potential solutions because of recurrent admission  1. Single chamber pacemaker, defibrillator and need interrogation to determine mode of pacing and frequency as well as histogram of heart rates.  In a percentage of patients paced from RV apex function will deteriorate.  Changing mode or adding lead my provide improvement  2. Right heart catheterization if repeated should include portal pressure measure  3. Patient may be candidate for heart kidney transplant if liver biopsy eliminate cirrhosis    Discussed with ANP  Patient seen personally by me.      Assessment and Plan: Eliseo Tanner is a 67 year old male with chronic systolic heart failure secondary to NICM (Stage D, NYHA Class IIIA)s/p HM 3 on 2/18/2020 moderate CAD, HTN, ABHINAV on CPAP, DM2, CKD Stage III, ANA. 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 He was admitted on 4/17/2023 for refractory hypervolemia.      Plan today:   - Bumex 5 mg IV times one  - Hold Coumadin for liver biopsy, IR consult placed.   - Hold Pletal for biopsy, discuss with IR in AM.      Acute on Chronic BV Systolic Heart Failure secondary to NICM s/p HM III LVAD. 2/23. Severe RV failure. Echo 4/28/23 with septum normal, AV opening with each systole, and LVIDd 5.4 cm. RHC at 5800 rpm 4/28/23 consistent with mRA-12, mPCWP-12, JOSE Co-7, JOSE CI-3.6-note with drop in LVAD speed no significant improvement in RA with increase in wedge.   Fluid status: hypervolemic. .  lbs based on RHC. Torsemide 100 mg TID.  hydrochlorothiazide 100 mg MWF. Bumex 5 mg IV times one. Prior auth for Subcutaneous lasix pending.   ACEi/ARB/ARNi: contraindicated due to renal dysfunction  Afterload reduction: hydralazine 50 mg tid (decreased this admission), imdur 120 mg daily, amlodipine 10 mg daily  BB contraindicated due to RV failure  Aldosterone antagonist: Discontinue Aldactone.   SGLT2: Change Farxiga to Jardiance 10 mg po daily due to renal function.   Phosphodiesterase-3 Enzyme Inhibitor: Cilostazol 100 mg BID for possible benefits in improving RV dysfunction/PH, started per Dr. Tomas   SCD prophylaxis ICD  MAP: 64  LDH trends: 281  Anticoagulation: warfarin dosing per pharmacy, INR goal 1.7-2.3 due to recurrent epistaxis, today 1.75. Hold Coumadin in prep for biopsy.   Antiplatelet: aspirin 81 mg daily  - Hold Pletal in prep for biopsy, discuss with IR in AM.   - Appreciate Palliative care consult.      The patient's HeartMate LVAD was interrogated 4/30/2023  * Speed 5800 rpm   * Pulsatility index 3.6  * Power 4.6 Driver   * Flow 4.9 L/minute   Fluid status: hypervolemic   Alarms were reviewed, and no acute findings.   The driveline exit site was covered with dressing clean, dry, and intact.   All external components were inspected and showed no evidence of damage or malfunction.     BERNARDA on CKD Stage IV.   - Cr- 3.07 (2.85).   - Stop Aldactone  - Change Farxiga to Jardiance.      Chronic LVAD drive line infection, 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 4/23 CT A/P with stable minimal soft tissue thickening. No collection along the driveline site.   - Continue cipro and Daptomycin.     Transaminitis. Mild nodular appearance of the liver with mild ascites on CT 4/23/23. US 4/29 consistent with Increased echogenicity of the hepatic parenchyma with mild coarsened echotexture and questionable nodularity which may represent chronic  "parenchymal liver disease.   - Appreciate Hepatology consult.      Hypothyroidism.   - TSH-7.78, Free T4-0.76.   - Increased Levothyroxine to 100 mcg daily.      Diarrhea. CDIF negative.   - Imodium prn.      Chronic Medical Conditions:   BPH. Continue PTA meds.   Gout. Continue Allopurinol.   BELA. Completed Venofer.   DM Type II, controlled.   Mood Disorder. Continue Paxil.      FEN: 2 gram sodium diet   PROPHY:  Coumadin on hold for biopsy  LINES:  PICC  DISPO: Potential discharge to home in AM   CODE STATUS:  Full Code   ================================================================    Interval History/ROS: He denies fever, chills, SOB, cough, nausea, vomiting, diarrhea, and LE edema. Abdominal distention unchanged today. He is tolerating oral intake and ambulating independently.     Last 24 hr care team notes reviewed.   ROS:  4 point ROS including Respiratory, CV, GI and , other than that noted in the HPI, is negative.     Medications: Reviewed in EPIC.     Physical Exam:   BP (!) 78/67 (BP Location: Left arm)   Pulse 98   Temp 97.5  F (36.4  C) (Oral)   Resp 18   Ht 1.702 m (5' 7\")   Wt 84.4 kg (186 lb 1.6 oz)   SpO2 93%   BMI 29.15 kg/m    GENERAL: Appears alert and oriented times three.   HEENT: Eye symmetrical and free of discharge bilaterally. Mucous membranes moist and without lesions.  NECK: Supple and without lymphadenopathy. JVD mid neck.   CV: RRR, S1S2 present with LVAD hum.   RESPIRATORY: Respirations regular, even, and unlabored. Lungs CTA throughout.   GI: Soft and non distended with normoactive bowel sounds present in all quadrants. No tenderness, rebound, guarding. No organomegaly.   EXTREMITIES: Trace bilateral LE peripheral edema. 2+ bilateral pedal pulses.   NEUROLOGIC: Alert and orientated x 3. CN II-XII grossly intact. No focal deficits.   MUSCULOSKELETAL: No joint swelling or tenderness.   SKIN: No jaundice. No rashes or lesions. LVAD drive line covered.     Data:  CMPRecent " Labs   Lab 04/30/23  0824 04/30/23  0707 04/29/23  2339 04/29/23  2154 04/29/23  0812 04/29/23  0503 04/28/23  1747 04/28/23  1737 04/28/23  1125 04/28/23  0603 04/25/23  0724 04/25/23  0609   *  --   --  126*  --  128*  --  130*  --  132*   < > 131*   POTASSIUM 3.5  --  5.2 5.4*  --  4.5  --  4.0  --  4.1   < > 2.9*   CHLORIDE 96*  --   --  97*  --  97*  --  97*  --  99   < > 93*   CO2 16*  --   --  16*  --  17*  --  18*  --  20*   < > 24   ANIONGAP 15  --   --  13  --  14  --  15  --  13   < > 14   * 127*  --  130*  162*   < > 139*   < > 120*   < > 126*   < > 107*   BUN 85.0*  --   --  84.1*  --  78.5*  --  75.9*  --  73.2*   < > 66.1*   CR 3.07*  --   --  3.21*  --  2.85*  --  2.44*  --  2.31*   < > 1.86*   GFRESTIMATED 21*  --   --  20*  --  23*  --  28*  --  30*   < > 39*   CALOS 8.2*  --   --  8.4*  --  8.3*  --  8.7*  --  8.6*   < > 8.5*   MAG 2.0  --   --  2.0  --  2.1  --  2.2  --  2.3   < > 1.8   PROTTOTAL  --   --   --   --   --   --   --   --   --   --   --  6.8   ALBUMIN  --   --   --   --   --   --   --   --   --  3.1*  --  3.0*   BILITOTAL  --   --   --   --   --   --   --   --   --   --   --  0.7   ALKPHOS  --   --   --   --   --   --   --   --   --   --   --  188*   AST  --   --   --   --   --   --   --   --   --   --   --  112*   ALT  --   --   --   --   --   --   --   --   --   --   --  77*    < > = values in this interval not displayed.     CBC  Recent Labs   Lab 04/30/23  0824 04/29/23  0503 04/28/23  1047 04/28/23  1040 04/28/23  1013 04/28/23  0603 04/27/23  1634 04/27/23  0708   WBC 7.3 8.7  --   --   --  7.9  --  8.6   RBC 3.22* 3.35*  --   --   --  3.44*  --  3.68*   HGB 9.7* 10.1* 9.5* 9.8*   < > 10.2*   < > 10.8*   HCT 31.2* 31.9*  --   --   --  32.9*  --  34.7*   MCV 97 95  --   --   --  96  --  94   MCH 30.1 30.1  --   --   --  29.7  --  29.3   MCHC 31.1* 31.7  --   --   --  31.0*  --  31.1*   RDW 23.3* 23.8*  --   --   --  23.9*  --  24.0*    223  --   --   --  210   --  223    < > = values in this interval not displayed.     INR  Recent Labs   Lab 04/30/23  0824 04/29/23  0503 04/28/23  0603 04/27/23  0708   INR 1.75* 1.49* 1.58* 1.47*       Time/Communication  I personally spent a total of 35 minutes. Of that 20 minutes was counseling/coordination of patient's care. Plan of care discussed with patient. See my note above for details. Patient discussed with Dr. Tomas.      Laine Leon FNP  4/30/2023

## 2023-05-01 NOTE — CONSULTS
"    Interventional Radiology  [facility] Consult Service Note  05/01/23   8:24 AM    Consult Requested: \"Liver biopsy, eval for cardiac transplant\"    Recommendations/Plan:    Patient is on IR schedule 5/2 for a transjugular liver biopsy with portal pressures.   Labs WNL for procedure. INR needs to be less than 2.5.  Orders entered for procedure and NPO status.  Consent will be done prior to procedure.     Please contact the IR charge RN at [phone] for estimated time of procedure.     Case and imaging discussed with IR attending, Dr. Yepez. Recommendations were reviewed with Laine Leon NP.    This is a 67 year old male with chronic systolic heart failure secondary to NICM (Stage D, NYHA Class IIIA)s/p HM 3 on 2/18/2020 moderate CAD, HTN, ABHINAV on CPAP, DM2, CKD Stage III, ANA. 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 He was admitted on 4/17/2023 for refractory hypervolemia and is being worked up for heart kidney transplant. Pt with transaminitis and mild nodular appearance of the liver with mild ascites on CT. Concern for possible cirrhosis. IR is consulted for transjugular liver biopsy with portal pressures as part of work-up. Pt can continue on ASA and pletal for transjug bx.    Diagnostic labs entered for Laine Leon NP.    Expected date of discharge:  TBD.    Vitals:   BP (!) 78/72   Pulse 102   Temp 97.5  F (36.4  C) (Oral)   Resp 18   Ht 1.702 m (5' 7\")   Wt 85.3 kg (188 lb)   SpO2 98%   BMI 29.44 kg/m      Pertinent Labs:   Lab Results   Component Value Date    WBC 7.2 05/01/2023    WBC 7.3 04/30/2023    WBC 8.7 04/29/2023    WBC 7.9 03/19/2021    WBC 6.6 03/17/2021    WBC 6.5 03/16/2021     Lab Results   Component Value Date    HGB 9.5 05/01/2023    HGB 9.7 " 04/30/2023    HGB 10.1 04/29/2023    HGB 10.1 03/19/2021    HGB 9.0 03/17/2021    HGB 8.9 03/16/2021     Lab Results   Component Value Date     05/01/2023     04/30/2023     04/29/2023     03/19/2021     03/17/2021     03/16/2021     03/15/2021     Lab Results   Component Value Date    INR 1.88 (H) 05/01/2023    INR 2.1 (A) 04/10/2023    INR 1.4 (H) 03/31/2023    INR 1.8 01/12/2023    INR 3.1 (A) 07/09/2021    PTT 43 (H) 03/24/2023    PTT 37 03/15/2021     Lab Results   Component Value Date    POTASSIUM 2.9 (L) 05/01/2023    POTASSIUM 4.2 05/03/2022    POTASSIUM 4.2 04/09/2021        COVID-19 Antibody Results, Testing for Immunity         No data to display            COVID-19 PCR Results        1/3/2022    10:11 2/7/2022    14:56 9/2/2022    17:49 9/10/2022    08:44 12/8/2022    16:41   COVID-19 PCR Results   COVID-19 Virus by PCR (External Result) POSITIVE         POSITIVE      Detected         Detected           SARS CoV2 PCR   Negative   Negative   Negative             2/7/2023    22:21 2/10/2023    10:40 3/23/2023    17:35 4/17/2023    17:16   COVID-19 PCR Results   COVID-19 Virus by PCR (External Result)       SARS CoV2 PCR Negative   Negative   Negative   Negative         This result is from an external source.       JUAN Fung CNP  Interventional Radiology  Pager: [phone]

## 2023-05-01 NOTE — PROGRESS NOTES
D: Stopped by to see patient. No VAD related questions or concerns at this time.  Briefly discussed liver biopsy and beginning talks of heart/kidney transplant possibility.  Pt with many questions.  I: Discussed POC and provided support and listened to patient's thoughts and concerns.  Encouraged pt to discuss questions further with team as they arise.  P: Continue to follow patient and address any questions or concerns patient and or caregiver may have.

## 2023-05-01 NOTE — PLAN OF CARE
Eliseo Tanner is a 67 year old male with chronic systolic heart failure secondary to NICM (Stage D, NYHA Class IIIA)s/p HM 3 on 2/18/2020 moderate CAD, HTN, ABHINAV on CPAP, DM2, CKD Stage III, ANA. 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 He was admitted on 4/17/2023 for volume overload, recurrent recent admissions for HF    Temp: 98.4  F (36.9  C) Temp src: Oral BP: (!) 88/61 Pulse: 102   Resp: 20 SpO2: 94 % O2 Device: None (Room air)      Neuro: AOx4  Cardiac: SR W/BBB occasionally paced, LAVD HM III  Resp: RA  GI/: Voids spontaneously, loose watery stools  Endo: ACHS  Diet: NPO for liver biopsy, otherwise 2g Na and 2L FR  Skin: generalized bruising  LDA: R SL PICC, R PIV , LVAD driveline  Activity: SBA  Pain: Denies  Changes/events: pt has had intermittent left nostril nose bleeds, most recently put quick clot in the nare to try and stop bleeding,  Plan: Prepare pt for liver biopsy. Continue to monitor and contact Cards 2 with any changes/concerns.

## 2023-05-01 NOTE — PROGRESS NOTES
Brief Care Coordination Note:    Spoke with Shasta at [facility]. She states they are able to resume outpatient infusions when patient is ready for discharge. Patient was receiving Daptomycin 500 mg IV daily and Bumex 5 mg IV twice weekly, labs were drawn weekly and patient was doing dressing changes at home. If anything changes prior to discharge, they will need updated orders.     [facility] - resume outpatient infusion  Phone: [phone]   Fax: [phone]     Yaz Luong RN, BSN  6A RN Care Coordinator  Ph: [phone]   Pager: [phone]

## 2023-05-01 NOTE — PROGRESS NOTES
Heart Transplant Referral  Date:  5/1/2023    Type of Evaluation:  Heart/kidney transplant  Lead Coordinator for Evaluation:  Luiza Mcneil    Patient referred for [facility] by Dr. Cisneros , as patient meets criteria for heart transplant evaluation.  Cardiac Diagnosis: Dilated myopathy: NICM   Age: 67 year old  ABO: A POS     Chart reviewed and the following barriers for transplant were noted in the chart:   BMI: 29   Substance Use History: None identified  Other Known Barriers to Transplant: None identified  COVID Vaccination Status: Complete    Plan: Plan to await liver biopsy and pathology prior to launching transplant evaluation. Will initiate transplant evaluation and coordinate testing including further education on heart transplant once liver biopsy pathology resulted.

## 2023-05-01 NOTE — PROGRESS NOTES
[facility]   Cardiology II Service / Advanced Heart Failure  Daily Progress Note  Date of Service: 5/1/2023      Patient: Eliseo Tanner  MRN: [id number]  Admission Date: 4/17/2023  Hospital Day # 14    Assessment and Plan: Eliseo Tanner is a 67 year old male with chronic systolic heart failure secondary to NICM (Stage D, NYHA Class IIIA)s/p HM 3 on 2/18/2020 moderate CAD, HTN, ABHINAV on CPAP, DM2, CKD Stage III, ANA. 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 He was admitted on 4/17/2023 for refractory hypervolemia.      Plan today:   - Bumex 5 mg IV times one, pending repeat K.   - Resume KCL at 40 MEQ TID  - Ok to resume coumadin per IR.   - Discontinue Pletal due to epistaxis.   - Plan for liver biopsy per IR in AM via transjugular approach.   - Device interrogation pending     Acute on Chronic BV Systolic Heart Failure secondary to NICM s/p HM III LVAD. 2/23. Severe RV failure. Echo 4/28/23 with septum normal, AV opening with each systole, and LVIDd 5.4 cm. RHC at 5800 rpm 4/28/23 consistent with mRA-12, mPCWP-12, JOSE Co-7, JOSE CI-3.6-note with drop in LVAD speed no significant improvement in RA with increase in wedge.   Fluid status: hypervolemic. .  lbs based on RHC. Torsemide 100 mg TID. hydrochlorothiazide 100 mg MWF. Bumex 5 mg IV times one. Prior auth for Subcutaneous lasix pending.   ACEi/ARB/ARNi: contraindicated due to renal dysfunction  Afterload reduction: hydralazine 50 mg tid (decreased this admission), imdur 120 mg daily, amlodipine 10 mg daily  BB contraindicated due to RV failure  Aldosterone antagonist: Discontinue Aldactone.   SGLT2: Change Farxiga to Jardiance 10 mg po daily due to renal function.   Phosphodiesterase-3 Enzyme Inhibitor: Cilostazol 100 mg BID  for possible benefits in improving RV dysfunction/PH, started per Dr. Tomas   SCD prophylaxis ICD  MAP: 74  LDH trends: 315  Anticoagulation: warfarin dosing per pharmacy, INR goal 1.7-2.3 due to recurrent epistaxis, today 1.88. Ok to resume Coumadin per IR.   Antiplatelet: aspirin 81 mg daily  - Discontinue Pletal in setting of epistaxis overnight.   - Appreciate Palliative care consult.   - Device interrogation per Thom today      The patient's HeartMate LVAD was interrogated 5/1/2023  * Speed 5800 rpm   * Pulsatility index 3.2  * Power 4.7 Driver   * Flow 5.2 L/minute   Fluid status: hypervolemic   Alarms were reviewed, and no acute findings.   The driveline exit site was covered with dressing clean, dry, and intact.   All external components were inspected and showed no evidence of damage or malfunction.     BERNARDA on CKD Stage IV. Hypokalemia  - Cr- 2.76 (3.07).   - Stopped Aldactone  - KCL resumed at 40 MEQ TID     Chronic LVAD drive line infection, 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 4/23 CT A/P with stable minimal soft tissue thickening. No collection along the driveline site.   - Continue cipro and Daptomycin.     Transaminitis. Mild nodular appearance of the liver with mild ascites on CT 4/23/23. US 4/29 consistent with Increased echogenicity of the hepatic parenchyma with mild coarsened echotexture and questionable nodularity which may represent chronic parenchymal liver disease.   - Appreciate Hepatology consult.      Hypothyroidism.   - TSH-7.78, Free T4-0.76.   - Increased Levothyroxine to 100 mcg daily.   - Repeat TSH in 6 weeks.      Diarrhea. CDIF negative.   - Imodium prn.      Chronic Medical Conditions:   BPH. Continue PTA meds.   Gout. Continue Allopurinol.   BELA. Completed Venofer.   DM Type II, controlled.   Mood Disorder. Continue Paxil.      FEN: 2 gram sodium  "diet   PROPHY:  Coumadin on hold for biopsy  LINES:  PICC  DISPO: Potential discharge to home in AM   CODE STATUS:  Full Code   ================================================================    Interval History/ROS: He denies fever, chills, chest pain, palpitations, cough, SOB, nausea, vomiting, diarrhea, and LE edema. He complains of epistaxis last HS. He notes mildly improved abdominal distention. He denies any concerns at his drive line site. He is tolerating oral intake and ambulating.     Last 24 hr care team notes reviewed.   ROS:  4 point ROS including Respiratory, CV, GI and , other than that noted in the HPI, is negative.     Medications: Reviewed in EPIC.     Physical Exam:   BP (!) 76/62 (BP Location: Right arm)   Pulse 96   Temp 97.8  F (36.6  C) (Oral)   Resp 16   Ht 1.702 m (5' 7\")   Wt 85.3 kg (188 lb)   SpO2 98%   BMI 29.44 kg/m    GENERAL: Appears alert and oriented times three.   HEENT: Eye symmetrical and free of discharge bilaterally. Mucous membranes moist and without lesions.  NECK: Supple and without lymphadenopathy. JVD mid neck.   CV: RRR, S1S2 present with LVAD hum.   RESPIRATORY: Respirations regular, even, and unlabored. Lungs CTA throughout.   GI: Soft and mildly distended with normoactive bowel sounds present in all quadrants. No tenderness, rebound, guarding. No organomegaly.   EXTREMITIES: No peripheral edema. 2+ bilateral pedal pulses.   NEUROLOGIC: Alert and orientated x 3. CN II-XII grossly intact. No focal deficits.   MUSCULOSKELETAL: No joint swelling or tenderness.   SKIN: No jaundice. No rashes or lesions. LVAD drive line covered.     Data:  CMPRecent Labs   Lab 05/01/23  1133 05/01/23  0839 05/01/23  0520 04/30/23  2142 04/30/23  1124 04/30/23  0824 04/30/23  0707 04/29/23  2339 04/29/23  2154 04/29/23  0812 04/29/23  0503 04/28/23  1125 04/28/23  0603 04/25/23  0724 04/25/23  0609   NA  --   --  128*  --   --  127*  --   --  126*  --  128*   < > 132*   < > 131* "   POTASSIUM  --   --  2.9*  --   --  3.5  --  5.2 5.4*  --  4.5   < > 4.1   < > 2.9*   CHLORIDE  --   --  93*  --   --  96*  --   --  97*  --  97*   < > 99   < > 93*   CO2  --   --  20*  --   --  16*  --   --  16*  --  17*   < > 20*   < > 24   ANIONGAP  --   --  15  --   --  15  --   --  13  --  14   < > 13   < > 14   * 120* 130* 121*   < > 283*   < >  --  130*  162*   < > 139*   < > 126*   < > 107*   BUN  --   --  82.2*  --   --  85.0*  --   --  84.1*  --  78.5*   < > 73.2*   < > 66.1*   CR  --   --  2.76*  --   --  3.07*  --   --  3.21*  --  2.85*   < > 2.31*   < > 1.86*   GFRESTIMATED  --   --  24*  --   --  21*  --   --  20*  --  23*   < > 30*   < > 39*   CALOS  --   --  8.0*  --   --  8.2*  --   --  8.4*  --  8.3*   < > 8.6*   < > 8.5*   MAG  --   --  2.0  --   --  2.0  --   --  2.0  --  2.1   < > 2.3   < > 1.8   PROTTOTAL  --   --   --   --   --   --   --   --   --   --   --   --   --   --  6.8   ALBUMIN  --   --   --   --   --   --   --   --   --   --   --   --  3.1*  --  3.0*   BILITOTAL  --   --   --   --   --   --   --   --   --   --   --   --   --   --  0.7   ALKPHOS  --   --   --   --   --   --   --   --   --   --   --   --   --   --  188*   AST  --   --   --   --   --   --   --   --   --   --   --   --   --   --  112*   ALT  --   --   --   --   --   --   --   --   --   --   --   --   --   --  77*    < > = values in this interval not displayed.     CBC  Recent Labs   Lab 05/01/23 0520 04/30/23 0824 04/29/23  0503 04/28/23  1047 04/28/23  1013 04/28/23  0603   WBC 7.2 7.3 8.7  --   --  7.9   RBC 3.17* 3.22* 3.35*  --   --  3.44*   HGB 9.5* 9.7* 10.1* 9.5*   < > 10.2*   HCT 29.8* 31.2* 31.9*  --   --  32.9*   MCV 94 97 95  --   --  96   MCH 30.0 30.1 30.1  --   --  29.7   MCHC 31.9 31.1* 31.7  --   --  31.0*   RDW 22.8* 23.3* 23.8*  --   --  23.9*    202 223  --   --  210    < > = values in this interval not displayed.     INR  Recent Labs   Lab 05/01/23 0520 04/30/23 0824 04/29/23  0503  04/28/23  0603   INR 1.88* 1.75* 1.49* 1.58*       Time/Communication  I personally spent a total of 35 minutes. Of that 20 minutes was counseling/coordination of patient's care. Plan of care discussed with patient. See my note above for details. Patient discussed with Dr. Dutton.      Laine SORTO  5/1/2023

## 2023-05-02 NOTE — PLAN OF CARE
Temp: 98  F (36.7  C) Temp src: Oral BP: (!) 78/66 Pulse: 83   Resp: 18 SpO2: 94 % O2 Device: None (Room air)       D: Admitted for refractory hypervolemia. PMHx of chronic systolic heart failure secondary to NICM s/p HM III LVAD, moderate CAD, HTN, DM2, and CKD stage III.     I/A: Eliseo is A/Ox4. LVAD numbers WDL. Tele in place, sinus. VSS on room air. R PIV SL, R PICC single lumen in place SL. K replaced this shift. NPO since midnight. Up stand by assist. Appeared to sleep well overnight    P: Continue to monitor and follow POC. Plan for liver biopsy. Notify Cards with changes     Goal Outcome Evaluation:      Plan of Care Reviewed With: patient    Overall Patient Progress: no change    Outcome Evaluation: Continue to replace electrolyte

## 2023-05-02 NOTE — PROGRESS NOTES
[facility]   Cardiology II Service / Advanced Heart Failure  Daily Progress Note      Patient: Eliseo Tanner  MRN: [id number]  Admission Date: 4/17/2023  Hospital Day # 15    Assessment and Plan: Eliseo Tanner is a 67 year old male with chronic systolic heart failure secondary to NICM (Stage D, NYHA Class IIIA)s/p HM 3 on 2/18/2020 moderate CAD, HTN, ABHINAV on CPAP, DM2, CKD Stage III, ANA. 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 He was admitted on 4/17/2023 for refractory hypervolemia.     Today's Plan:  - liver biopsy today  - increase hczt to daily  - possible discharge tomorrow    # Acute on chronic systolic heart failure/HFrEF secondary to NICM  # Acute on chronic RV failure  # s/p HM3 LVAD  Stage D. NYHA Class IIIB.  Echo 4/28/23 septum normal, AV opening with each systole, and LVIDd 5.4 cm. RHC at 5800 rpm 4/28/23 mRA-12, mPCWP-12, JOSE Co-7, JOSE CI-3.6-note with drop in LVAD speed no significant improvement in RA with increase in wedge, weight 178 lb that day.     -Fluid status: grossly euvolemic, weight up  lb, pta torsemide 100 mg TID, hydrochlorothiazide 100 mg increase to daily, and IV bumex three times weekly, PA for subQ lasix is pending  -RV support: Phosphodiesterase-3 Enzyme Inhibitor: Cilostazol 100 mg BID for possible benefits in improving RV dysfunction/PH, started per Dr. Tomas - d/c'ed due to epixstaxis  -ACEi/ARB/ARNi: contraindicated due to renal dysfunction  -Afterload reduction: hydralazine 50 mg tid (decreased this admission), imdur 120 mg daily, amlodipine 10 mg daily  -BB contraindicated due to RV failure  -Aldosterone antagonist d/c'd due to renal dysfunction   -SGLT2i changed to Jardiance due to renal dysfunction  -SCD prophylaxis; ICD  -MAP:   (outlier)  -LDH trends: 300s  -Anticoagulation: warfarin dosing per pharmacy, INR goal 1.7-2.3 due to epistaxis, today 1.8  -Antiplatelet: asa 81 mg dialy    # BERNARDA on CKD Stage I  # Diuretic induced hypokalemia  - Cr- 2.6  - Stopped Aldactone  - KCL 40 MEQ TID    # Chronic LVAD drive line infection, 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 4/23 CT A/P with stable minimal soft tissue thickening. No collection along the driveline site.   - Continue cipro and iza Daptomycin.     # Transaminitis. Mild nodular appearance of the liver with mild ascites on CT 4/23/23. US 4/29 consistent with Increased echogenicity of the hepatic parenchyma with mild coarsened echotexture and questionable nodularity which may represent chronic parenchymal liver disease.   - Appreciate Hepatology consult.   - liver biopsy today, results pending  - Hepatic Pressures:  Wedged Hepatic: 38  Free Hepatic: 23  Right Atrium: 21     # Hypothyroidism.   - TSH-7.78, Free T4-0.76.   - Increased Levothyroxine to 100 mcg daily.   - Repeat TSH in 6 weeks.      # Diarrhea. CDIF negative.   - Imodium prn.      Chronic Medical Conditions:   BPH. Continue PTA meds.   Gout. Continue Allopurinol.   BELA. Completed Venofer.   DM Type II, controlled.   Mood Disorder. Continue Paxil.      FEN: 2g Na, 2L FR  PROPHY:  warfarin  LINES:  PICC  DISPO:  Pending liver bx  CODE STATUS:   Full code    Siena Aguiar DNP, NP-C  Advanced Heart Failure/Cardiology II Service  Pager [phone] [facility] [id number]      Patient discussed with Dr. Dutton      45 minutes spent on the date of the encounter doing chart review, history and exam, documentation and further activities per the note    ================================================================    Subjective/24-Hr Events:   Last 24 hr care team notes reviewed. No overnight events. Biopsy went ok, no issues.     ROS:  4  "point ROS including respiratory, CV, GI and  (other than that noted in the HPI) is negative.     Medications: Reviewed in EPIC.     Physical Exam:   BP (!) 74/63 (BP Location: Left arm)   Pulse 102   Temp 97.7  F (36.5  C) (Oral)   Resp 18   Ht 1.702 m (5' 7\")   Wt 84.3 kg (185 lb 12.8 oz)   SpO2 94%   BMI 29.10 kg/m      GENERAL: Appears comfortable, in no distress.  HEENT: Eye symmetrical, no discharge or icterus bilaterally. Mucous membranes moist and without lesions.  NECK: Supple, JVD elevated at 90 degrees.   CV: +mechanical VAD hum  RESPIRATORY: Respirations regular, even, and unlabored. Lungs CTA throughout.    GI: Soft, obese and non distended with normoactive bowel sounds present in all quadrants. No tenderness, rebound, guarding.   EXTREMITIES: Trace peripheral edema. Non pulsatile.   NEUROLOGIC: Alert and oriented x 3. No focal deficits.   MUSCULOSKELETAL: No joint swelling or tenderness.   SKIN: No jaundice. No rashes or lesions.     Labs:  CMP  Recent Labs   Lab 05/02/23  0337 05/01/23  2142 05/01/23  1944 05/01/23  1643 05/01/23  1317 05/01/23  1133 05/01/23  0839 05/01/23  0520 04/30/23  1124 04/30/23  0824 04/29/23  2339 04/29/23  2154 04/28/23  1125 04/28/23  0603   *  --   --   --   --   --   --  128*  --  127*  --  126*   < > 132*   POTASSIUM 3.1*  3.1*  --  3.4  --  3.0*  --   --  2.9*  --  3.5   < > 5.4*   < > 4.1   CHLORIDE 94*  --   --   --   --   --   --  93*  --  96*  --  97*   < > 99   CO2 21*  --   --   --   --   --   --  20*  --  16*  --  16*   < > 20*   ANIONGAP 14  --   --   --   --   --   --  15  --  15  --  13   < > 13   * 164*  --   --   --  193* 120* 130*   < > 283*   < > 130*  162*   < > 126*   BUN 82.0*  --   --   --   --   --   --  82.2*  --  85.0*  --  84.1*   < > 73.2*   CR 2.66*  --   --   --   --   --   --  2.76*  --  3.07*  --  3.21*   < > 2.31*   GFRESTIMATED 25*  --   --   --   --   --   --  24*  --  21*  --  20*   < > 30*   CALOS 8.3*  --   --   --  "  --   --   --  8.0*  --  8.2*  --  8.4*   < > 8.6*   MAG 2.5*  --   --  2.7*  --   --   --  2.0  --  2.0  --  2.0   < > 2.3   ALBUMIN  --   --   --   --   --   --   --   --   --   --   --   --   --  3.1*    < > = values in this interval not displayed.       CBC  Recent Labs   Lab 05/02/23 0337 05/01/23 0520 04/30/23 0824 04/29/23  0503   WBC 7.7 7.2 7.3 8.7   RBC 3.27* 3.17* 3.22* 3.35*   HGB 9.7* 9.5* 9.7* 10.1*   HCT 30.5* 29.8* 31.2* 31.9*   MCV 93 94 97 95   MCH 29.7 30.0 30.1 30.1   MCHC 31.8 31.9 31.1* 31.7   RDW 22.5* 22.8* 23.3* 23.8*    200 202 223       INR  Recent Labs   Lab 05/02/23 0337 05/01/23 0520 04/30/23 0824 04/29/23  0503   INR 1.79* 1.88* 1.75* 1.49*

## 2023-05-02 NOTE — PROGRESS NOTES
D: Stopped by to see patient. No VAD related questions or concerns at this time.   I: Discussed POC and provided support and listened to patient's thoughts and concerns.  P: Continue to follow patient and address any questions or concerns patient and or caregiver may have.

## 2023-05-02 NOTE — PLAN OF CARE
Goal Outcome Evaluation:    VSS. RA.  numbers WNL. Liver bx done today. R internal jugular site bleeding, quick clot applied. K+ 3.1, replaced. Recheck 4.3. Plan to discharge home tomorrow per C2. Pt arranging wife to pick him up. Denies pain. Denies SOB. VAD dressing changed per WOC today, continue daily dressing change per order. P- Continue to monitor and notify team of changes.

## 2023-05-02 NOTE — PROCEDURES
The patient's HeartMate LVAD was interrogated 5/2/2023  * Speed 5800 rpm   * Pulsatility index 3.8   * Power 5 Driver   * Flow 4.8 L/minute   Fluid status: grossly euvolemic   Alarms were reviewed, and notable for rare events.   The driveline exit site was inspected, dressing cdi.   All external components were inspected and showed no evidence of damage or malfunction, none replaced.   No changes to VAD settings made

## 2023-05-02 NOTE — PROGRESS NOTES
" CLINICAL NUTRITION SERVICES    Reviewed nutrition risk factors due to LOS. Pt is tolerating diet and eating adequately per nursing documentation (continues to eat 100% of meals consistently with a good appetite per % intake flowsheets). Per discussion with pt early this afternoon (pt about to eat lunch), reports good/adequate oral intake. Reviewed wt hx: 88.6 kg (3/20/20), 98 kg (3/8/2022), 89 kg (10/18/2022, 80.3 kg (2/14/2023), 80.2 kg (3/28/2023), 91.4 kg (4/17, admit), 80.8 kg (4/25), 84.3 kg (5/2)- Diuresis this admission. Per RD note 9/2022, \"Wt is down this admit (218 lbs on 9/2-->183 lbs on 9/9), but he was admitted for refractory hypervolemia and is undergoing diuresis.\"     Follow Up / Monitoring:   Will continue to follow pt via rounds.    Abby Woods, MS, RD, LD, CNSC   6C/5A(beds 5201 - 5206) RD pgr: [phone]          "

## 2023-05-02 NOTE — IR NOTE
Patient Name: Eliseo Tanner  Medical Record Number: [id number]  Today's Date: 5/2/2023    Procedure: Transcatheter Biopsy  Proceduralist: Dr. Damon  Pathology present: NA    Procedure Start: 0952  Procedure end: 1030  Sedation medications administered: Midazolam 1 mg, Fentanyl 100 mcg       Report given to:  RN  : TRENTON    Other Notes: Pt arrived to IR room 5 from . Consent reviewed. Pt denies any questions or concerns regarding procedure. Pt positioned supine and monitored per protocol. Pt tolerated procedure without any noted complications. Pt transferred back to .  3 Passes made; 2 Cores collected and 1 Fragmented Core.  Labs sent to surgical pathology.  Right Jugular Intervention site CDI.

## 2023-05-02 NOTE — PRE-PROCEDURE
GENERAL PRE-PROCEDURE:   Date/Time:  5/2/2023 9:30 AM    Appropriately NPO:  Yes  ASA Class:  2  Mallampati  :  Grade 2- soft palate, base of uvula, tonsillar pillars, and portion of posterior pharyngeal wall visible  Lungs:  Lungs clear with good breath sounds bilaterally  Lung exam comment:  LVAD.  Heart:  Other (comment)  Heart exam comment:  LVAD.  History & Physical reviewed:  History and physical reviewed and no updates needed  Statement of review:  I have reviewed the lab findings, diagnostic data, medications, and the plan for sedation

## 2023-05-02 NOTE — PROCEDURES
[facility]    Procedure: TJ pressures and biopsy    Date/Time: 5/2/2023 10:52 AM    Performed by: Forrest Damon MD  Authorized by: Forrest Damon MD      UNIVERSAL PROTOCOL   Site Marked: NA  Prior Images Obtained and Reviewed:  Yes  Required items: Required blood products, implants, devices and special equipment available    Patient identity confirmed:  Verbally with patient, arm band, provided demographic data and hospital-assigned identification number  Patient was reevaluated immediately before administering moderate or deep sedation or anesthesia  Confirmation Checklist:  Patient's identity using two indicators, relevant allergies, procedure was appropriate and matched the consent or emergent situation and correct equipment/implants were available  Time out: Immediately prior to the procedure a time out was called    Universal Protocol: the Joint Commission Universal Protocol was followed    Preparation: Patient was prepped and draped in usual sterile fashion       ANESTHESIA    Anesthesia: Local infiltration  Local Anesthetic:  Lidocaine 1% without epinephrine      SEDATION  Patient Sedated: Yes    Sedation:  Fentanyl and midazolam  Vital signs: Vital signs monitored during sedation    See dictated procedure note for full details.  Findings: TJ pressures (RA, free hepatic, wedged hepatic) and 3 x core biopsies.    Specimens: none    Complications: None    Condition: Stable      PROCEDURE    Patient Tolerance:  Patient tolerated the procedure well with no immediate complications  Length of time physician/provider present for 1:1 monitoring during sedation: 20

## 2023-05-02 NOTE — PROGRESS NOTES
[facility]  WO Nurse Inpatient Assessment     Consulted for: LVAD site     Patient History (according to provider note(s):      Eliseo Tanner is a 67 year old male with a past medical history significant for chronic systolic heart failure secondary to NICM (Stage D, NYHA Class IIIA) now s/p HM 3 on 2/18/2020 moderate CAD, HTN, ABHINAV on CPAP, DM2, CKD Stage III, ANA. 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 He was admitted on 4/17/2023 for volume overload management.     Notably he has already had 2 prior admissions in 2023 for hypervolemia requiring IV diuresis, with a general decline in renal function over the last 4 months in the outpatient setting from a baseline of 1.5 to an approximate baseline of 2.1 and despite maximizing volume status by giving IV diuretics as an outpatient, he required readmission for fluid overload / worsening heart failure.      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, s/p I&D a few months ago  Wound base: 75 % slough vs fibrin stuck on tube, 25% granulation     Palpation of the wound bed: firm      Drainage: moderate     Description of drainage: serosanguinous, cloudy and yellow     Measurements (length x width x depth, in cm): 1 x 0.5 x  0.2 cm      Tunneling: N/A     Undermining: N/A  Periwound skin: Scar tissue      Color: pink      Temperature: normal   Odor: none  Pain: denies , none  Pain interventions prior to dressing change: slow and gentle cares   Treatment goal: Infection control/prevention  STATUS: stable, improving    Supplies ordered: gathered, at bedside and discussed with patient        Treatment Plan:     LVAD driveline wound(s): Daily  remove soiled dressing and discard, apply Vashe (032567) soaked gauze over wound for a few minutes and remove, then use vashe moistened gauze to clean up any drainage around the tube as able, don sterile gloves per protocol, apply cut to fit hydrofera blue foam ([id number]) moistened with saline and ring out excess then apply over wound bed/around tube. Cover with two 4x4 mepilex. Keep tail end of tube secured with anchor device per protocol.      Orders: Reviewed    RECOMMEND PRIMARY TEAM ORDER: None, at this time  Education provided: plan of care  Discussed plan of care with: Patient and Nurse  [facility] nurse follow-up plan: weekly  Notify WOC if wound(s) deteriorate.  Nursing to notify the Provider(s) and re-consult the [facility] Nurse if new skin concern.    DATA:     Current support surface: Standard  Standard gel/foam mattress (IsoFlex, Atmos air, etc)  Containment of urine/stool: Continent of bladder and Continent of bowel  BMI: Body mass index is 29.1 kg/m .   Active diet order: Orders Placed This Encounter      2 Gram Sodium Diet     Output: I/O last 3 completed shifts:  In: 1497 [P.O.:1497]  Out: 3500 [Urine:3500]     Labs:   Recent Labs   Lab 05/02/23  0337 04/28/23  1013 04/28/23  0603   ALBUMIN  --   --  3.1*   PREALB  --   --  23   HGB 9.7*   < > 10.2*   INR 1.79*   < > 1.58*   WBC 7.7   < > 7.9    < > = values in this interval not displayed.     Pressure injury risk assessment:   Sensory Perception: 4-->no impairment  Moisture: 4-->rarely moist  Activity: 3-->walks occasionally  Mobility: 4-->no limitation  Nutrition: 3-->adequate  Friction and Shear: 3-->no apparent problem  Jens Score: 21      Pager no longer is use, please contact through [employer]: [facility] Nurse  Dept. Office Number: [phone]

## 2023-05-03 NOTE — PROGRESS NOTES
Care Management Discharge Note    Discharge Date: 05/03/2023       Discharge Disposition:  To home with wife    Discharge Services:  [facility] - resume outpatient infusion  Phone: [phone]   Fax: [phone]   I called pharmacist: Kay at 12:48am and informed her :  IV Dapto will be same dose but is now every other day due to kidney function and is due Thursday, 5/4/23  IV Bumex will stay the same dose: 2 days/week  Lab draws once per week.  Pt usually arrives at 0900 for 10am infusion  PICC dressing due for change on 5/7/23.    Pt/wife independent with LVAD dressing change.    Discharge DME: none      Discharge Transportation: family or friend will provide. Wife is here today--4 hour drive.            Education Provided on the Discharge Plan:  alex Forbes clarified plan with DR Dutton and myself and Cintia informed pt. I informed bedside RN: Lorenzo [id number]  Persons Notified of Discharge Plans: alex gay 2, bedside RN and myself and pt/wife.  Patient/Family in Agreement with the Plan:  Yes    Handoff Referral Completed: Yes    Additional Information:  I will fax AVS and discharge summary to [facility] Infusion when signed.        Jaclyn Osorio, KRISTEN

## 2023-05-03 NOTE — DISCHARGE SUMMARY
[facility]    Discharge Summary - Cardiology       Date of Admission:  4/17/2023   Date of Discharge:  5/3/2023  2:23 PM  Discharging Provider: Dr. Mantilla  Attending: Dr. Dutton  Discharge Service: Hospitalist Service    Discharge Diagnoses   # Acute on chronic systolic heart failure/HFrEF secondary to NICM  # Acute on chronic RV failure  # s/p HM3 LVAD  # BERNARDA on CKD Stage I  # Diuretic induced hypokalemia  # Chronic LVAD drive line infection, MSSA and PSA.  # Transaminitis.   # Hypothyroidism.   # Diarrhea.   BPH.   Gout.   BELA.   DM Type II, controlled.   Mood Disorder.     Follow-ups Needed After Discharge   Follow-up Appointments     Add follow up information to the AVS prior to printing      Follow Up and recommended labs and tests      Followup with:  - Cardiology clinic  - LVAD Infectious Disease clinic    - Get a basic metabolic panel (blood draw) later this week (this has been   ordered; you just need to go to a lab location to have it drawn)             Unresulted Labs Ordered in the Past 30 Days of this Admission     Date and Time Order Name Status Description    5/1/2023  8:31 AM Surgical pathology exam - Native Liver In process       These results will be followed up by patient's cardiology team.    Discharge Disposition   Discharged to home  Condition at discharge: Tenuous    Hospital Course   Eliseo Tanner was admitted on 4/17/2023 for refractory hypervolemia.  The following problems were addressed during his hospitalization:    # Acute on chronic systolic heart failure/HFrEF secondary to NICM  # Acute on chronic RV failure  # s/p HM3 LVAD  Stage D. NYHA Class IIIB.  Echo 4/28/23 septum normal, AV opening with each systole, and LVIDd 5.4 cm. RHC at 5800 rpm 4/28/23 mRA-12, mPCWP-12, JOSE Co-7, JOSE CI-3.6-note with drop in LVAD speed no significant improvement in RA with increase in wedge, weight 178 lb that day. Patient was diuresed with IV diuretics  requiring a drip at one point and was slowly transitioned to regimen noted below. Weight still up at time of discharge, but patient not willing to remain hospitalized any longer.  -Fluid status: grossly euvolemic, weight up about 10 pounds from  lb, pta torsemide 100 mg TID, hydrochlorothiazide 100 mg increase to daily, and IV bumex three times weekly, PA for subQ lasix is pending  -RV support: None. Attempted cilostazol, but discontinued due to epistaxis.   -ACEi/ARB/ARNi: contraindicated due to renal dysfunction  -Afterload reduction: hydralazine 50 mg tid (decreased this admission from PTA dose of 100mg TID), imdur 120 mg daily, amlodipine 10 mg daily  -BB contraindicated due to RV failure  -Aldosterone antagonist d/c'd due to renal dysfunction   -SGLT2i changed to Jardiance due to renal dysfunction  -SCD prophylaxis; ICD  -MAP:  (outlier)  -LDH trends: 300s  -Anticoagulation: warfarin dosing per pharmacy, INR goal 1.7-2.3 due to epistaxis  -Antiplatelet: ASA 81 mg dialy     # BERNARDA on CKD Stage I  # Diuretic induced hypokalemia  - Cr- 2.59 at discharge  - Aldactone stopped due to BERNARDA  - KCl 40 MEQ TID     # Chronic LVAD drive line infection, 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 4/23 CT A/P with stable minimal soft tissue thickening. No collection along the driveline site.   - Continue cipro (reduced dose to 500mg qday d/t kidney function)  - C/t PTA Daptomycin (reduced dose to 500mg q48H d/t kidney function)     # Transaminitis. Mild nodular appearance of the liver with mild ascites on CT 4/23/23. US 4/29 consistent with Increased echogenicity of the hepatic parenchyma with mild coarsened echotexture and questionable nodularity which may represent chronic parenchymal liver disease.   - Liver biopsy on 5/2; results currently pending  - Hepatic Pressures:  Wedged Hepatic: 38  Free Hepatic: 23  Right  Atrium: 21     # Hypothyroidism.   - TSH-7.78, Free T4-0.76.   - Increased Levothyroxine to 100 mcg daily.   - Repeat TSH in 6 weeks.      # Diarrhea. CDIF negative.   - Imodium prn.      Chronic Medical Conditions:   BPH. Continue PTA meds.   Gout. Continue Allopurinol.   BELA. Completed Venofer.   DM Type II, controlled.   Mood Disorder. Continue Paxil.     Consultations This Hospital Stay   CARDIAC REHAB IP CONSULT  NUTRITION SERVICES ADULT IP CONSULT  PHARMACY TO DOSE WARFARIN  LYMPHEDEMA THERAPY IP CONSULT  PHYSICAL THERAPY ADULT IP CONSULT  OCCUPATIONAL THERAPY ADULT IP CONSULT  PHARMACY TO DOSE WARFARIN  PALLIATIVE CARE ADULT IP CONSULT  WOUND OSTOMY CONTINENCE NURSE  IP CONSULT  NURSING TO CONSULT FOR VASCULAR ACCESS CARE IP CONSULT  CARE MANAGEMENT / SOCIAL WORK IP CONSULT  GI HEPATOLOGY ADULT IP CONSULT  INTERVENTIONAL RADIOLOGY ADULT/PEDS IP CONSULT  PHARMACY TO DOSE WARFARIN  INFECTIOUS DISEASE GENERAL ADULT IP CONSULT  SMOKING CESSATION PROGRAM IP CONSULT    Code Status   Full Code       The patient was discussed with Dr. Nato Mantilla MD  Cardiology Service  [facility] [address] [address]  Phone: [phone]  _________________________________________________________________    Physical Exam   Vital Signs: Temp: 98.4  F (36.9  C) Temp src: Oral BP: 108/70 Pulse: 100   Resp: 18 SpO2: 95 % O2 Device: None (Room air)    Weight: 188 lbs 9.6 oz  GENERAL: Appears comfortable, in no distress.  NECK: Supple, JVD slightly elevated at 90 degrees.   CV: +mechanical VAD hum  RESPIRATORY: Respirations regular, even, and unlabored. Lungs CTA throughout.    GI: Soft, obese and non distended with normoactive bowel sounds present in all quadrants. No tenderness, rebound, guarding.   EXTREMITIES: Trace peripheral edema. Non pulsatile.   NEUROLOGIC: Alert and oriented x 3. No focal deficits.  SKIN: No jaundice. No rashes or lesions.       Primary Care Physician    Jay Steele    Discharge Orders      Basic metabolic panel     CBC with platelets    Fax results to ID clinic [phone]     Comprehensive metabolic panel    Fax results to ID clinic [phone]     CK total    Fax results to ID clinic [phone]     Medication Therapy Management Referral      Follow-Up with Cardiology      OPAT enrollment and ID Clinic Referral      Reason for your hospital stay    Dear Eliseo Tanner    Your were hospitalized at [facility] with volume overload which was continuing despite treatment outside the hospital and treated with IV diuretics.  Over your hospitalization your volume overload improved, but is likely not completely resolved.  Today you are being discharged home.    Notably, you also had a liver biopsy during your hospitalization and we are still awaiting the results of this.    We are suggesting the following medication changes:  - STOP dapagliflozin; REPLACE with empagliflozin  - REDUCE ciprofloxacin  dose to 500mg once daily  - STOP ferrous sulfate  - DECREASE hydralazine to 50mg three times daily (reduction from prior dose of 100mg three times daily)  - INCREASE hydrochlorothiazide to 100mg daily (increase from prior dose of 75mg twice per week)  - INCREASE levothyroxine to 100mcg daily (increase from prior dose of 88mcg daily)  - STOP spironolactone    Please get the following tests done:  - Basic metabolic panel later this week (I have placed the order for this)    Please set up an appointment with:  - The LVAD infectious disease clinic (they should call you to set this up)  - Your cardiology clinic (I have alerted the coordinators about your discharge and they should set up this followup with you; please call the clinic if you don't hear from them in the next few days)    It was a pleasure meeting with you today. Thank you for allowing our team the privilege of caring for you today. You are the reason we are here, and I truly hope we  provided you with the excellent service you deserve. Please let us know if there is anything else we can do for you so that we can be sure you are leaving completely satisfied with your care experience.    Your hospital unit at the time of discharge is 6C so if you have any questions please call the hospital at [phone] and ask to talk to a nurse on 6C.    Take care!    Byron Mantilla MD  PGY-3  Internal Medicine Residency  [facility]     Activity    Your activity upon discharge: activity as tolerated     Monitor and record    Weigh yourself every morning     When to contact your care team    Contact your care team If symptoms get worse, If increased shortness of breath, If waking up at night with difficulty breathing, If unable to lie down for sleep due to symptoms, If weight gain of 2 pounds a day for 2 days in a row OR 5 pounds in 1 week., Increased swelling in your ankles or legs, and Dizziness or lightheadedness     Discharge Follow Up - with Primary Care clinic within 2 WEEKS (RN to schedule prior to d/c for HE/Entira primary care     Add follow up information to the AVS prior to printing     Follow Up and recommended labs and tests    Followup with:  - Cardiology clinic  - LVAD Infectious Disease clinic    - Get a basic metabolic panel (blood draw) later this week (this has been ordered; you just need to go to a lab location to have it drawn)     Activity    Your activity upon discharge: activity as tolerated     Diet    Follow this diet upon discharge: Orders Placed This Encounter      Fluid restriction 2000 ML FLUID      Snacks/Supplements Adult: Other; 2 PM - Peaches and strawberry Greek Yogurt; Between Meals      2 Gram Sodium Diet     Diet    Follow this diet upon discharge: Orders Placed This Encounter      Fluid restriction 2000 ML FLUID      Snacks/Supplements Adult: Other; 2 PM - Peaches and strawberry Greek Yogurt; Between Meals      2 Gram Sodium Diet     Most Recent 3  CBC's:Recent Labs   Lab Test 05/03/23  0555 05/02/23  0337 05/01/23  0520   WBC 6.7 7.7 7.2   HGB 9.7* 9.7* 9.5*   MCV 94 93 94    206 200     Most Recent 3 BMP's:Recent Labs   Lab Test 05/03/23  0722 05/03/23  0555 05/02/23  2130 05/02/23  1807 05/02/23  1108 05/02/23  1100 05/02/23  0337 05/01/23  0839 05/01/23  0520   NA  --  129*  --   --   --   --  129*  --  128*   POTASSIUM  --  2.9*  --  4.2  --  3.3* 3.1*  3.1*   < > 2.9*   CHLORIDE  --  94*  --   --   --   --  94*  --  93*   CO2  --  22  --   --   --   --  21*  --  20*   BUN  --  83.5*  --   --   --   --  82.0*  --  82.2*   CR  --  2.59*  --   --   --   --  2.66*  --  2.76*   ANIONGAP  --  13  --   --   --   --  14  --  15   CALOS  --  8.1*  --   --   --   --  8.3*  --  8.0*   * 128* 164*  --    < >  --  150*   < > 130*    < > = values in this interval not displayed.     Most Recent 2 LFT's:Recent Labs   Lab Test 04/25/23  0609 04/23/23  0620 03/24/23  0749   *  --  141*   ALT 77* 86* 85*   ALKPHOS 188* 188* 216*   BILITOTAL 0.7 0.6 0.7  0.6     Most Recent 3 INR's:Recent Labs   Lab Test 05/03/23  0555 05/02/23 0337 05/01/23  0520   INR 2.03* 1.79* 1.88*     Most Recent 3 BNP's:Recent Labs   Lab Test 04/17/23  1716 03/23/23  1735 02/07/23  1826 09/02/22  1656 02/10/22  0730   NTBNPI 2,572* 1,452* 3,352*   < >  --    NTBNP  --   --   --   --  4,416*    < > = values in this interval not displayed.   ,   Results for orders placed or performed during the hospital encounter of 04/17/23   XR Chest 2 Views    Narrative    Chest 2 views    INDICATION: Fluid overload    COMPARISON: Chest CT 3/24/2023. Plain films 3/23/2023.    FINDINGS: Heart size and shape appear normal. LVAD. Implantable  cardiac defibrillator. Costophrenic angles are reasonably sharp  bilaterally. No suspicious pulmonary opacities.      Impression    IMPRESSION: No radiographically dominant pulmonary edema.    AIME GAY MD         SYSTEM ID:  [id number]   XR Chest  Port 1 View    Narrative    EXAMINATION: XR CHEST PORT 1 VIEW, 4/19/2023 9:48 AM    COMPARISON: 4/17/2023    HISTORY: chronic RUE picc, confirm placement    FINDINGS: Heart size is enlarged and unchanged. Increased perihilar  opacities. Right PICC line tip overlies the high right atrium. Left  chest wall implantable cardiac defibrillator in place. LVAD.      Impression    IMPRESSION: Mild interstitial prominence may represent mild pulmonary  edema. Chest otherwise stable with continued cardiomegaly.      KRYSTEN SOLIZ MD         SYSTEM ID:  [id number]   CT Chest Abdomen Pelvis w/o Contrast    Narrative    CT CHEST ABDOMEN PELVIS W/O CONTRAST 4/23/2023 2:49 PM    History: chronic driveline infection, assess for increased fluid  collection around driveline    Comparison: CT chest 3/24/2023.    Technique: Helical CT acquisition from the lung apices to the pubic  symphysis was obtained without intravenous contrast. Axial, coronal,  and sagittal reconstructions were obtained and reviewed.    Findings:   Devices: Left chest wall implantable cardiac defibrillator with leads  in the right atrium and right ventricle. LVAD in place with the normal  appearance of the driveline. Stable minimal soft tissue thickening at  the driveline exit site in the abdomen wall. No collection along the  drive line. The outflow tube is intact. Patency cannot be evaluated on  noncontrast exam.    CHEST:   Lungs: Stable mild diffuse chronic centrilobular groundglass  opacities. Stable small right pleural effusion. No pneumothorax,  pleural effusion, focal airspace opacity, or suspicious pulmonary  nodule.  Airways: Central tracheobronchial tree is clear.  Vessels: Main pulmonary artery and aorta are normal in caliber. Normal  three-vessel arch.  Heart: Heart size is normal without pericardial effusion.  Lymph nodes: No suspicious mediastinal lymphadenopathy.  Thyroid: Within normal limits.  Esophagus: Within normal limits    ABDOMEN  PELVIS:    Liver: Nodular hepatic surface. Trace perihepatic ascites. No focal  lesions. Biliary system: Punctate cholelithiasis without acute  cholecystitis. No intrahepatic or extrahepatic biliary ductal  dilatation.  Pancreas: Diffuse fatty atrophy of the pancreas. No focal mass or  dilation of the main pancreatic duct.  Stomach: Within normal limits.  Spleen: Within normal limits.  Adrenal glands: Within normal limits.  Kidneys: Atrophy kidneys. No focal mass, hydronephrosis, or stone.  Bladder: Within normal limits.  Reproductive organs: Within normal limits.  Colon: Within normal limits.  Small Bowel: Within normal limits. The appendix is normal.  Lymph nodes: No intra-abdominal or pelvic lymphadenopathy. Stable  prominent inguinal lymph nodes.  Vasculature: No aortic aneurysm.  Peritoneum: No intraabdominal free fluid or air.     Soft tissues: No suspicious soft tissue mass or fluid collection.   Bones: No acute osseous lesion. Intact sternotomy sutures.  Degenerative changes in the lumbar spine with retrolisthesis at L3-4  causing mild bilateral neural foraminal narrowing.      Impression    IMPRESSION:   1. LVAD in place with stable minimal soft tissue thickening at the  driveline exit site in the abdomen wall. No collection along the drive  line.    2. No acute abnormality in the chest, abdomen or pelvis.  3. Stable trace right pleural effusion. Stable chronic bilateral  centrilobular groundglass opacities likely representing smoking  associated respiratory bronchiolitis.  4. Mild nodular appearance of the liver may indicate chronic  parenchymal disease. Recommend correlation with laboratory parameters  5. Mild ascites.  6. Cholelithiasis without cholecystitis.    OSITO BRAVO MD         SYSTEM ID:  [id number]   US Abdomen Limited    Narrative    EXAMINATION: Limited Abdominal Ultrasound, 4/29/2023 10:21 AM     COMPARISON: 3/24/2023    History: liver ultrasound, evaluating for cirrhosis.      FINDINGS:    Limited exam due to overlying LVAD/bandaging.    Pancreas: Pancreas is not well visualized.    Liver: The liver measures approximately 19 cm and demonstrates  increased echogenicity with slight coarsened echotexture of the  hepatic parenchyma. There is questionable mild surface nodularity. No  evidence of a focal hepatic mass. The main portal vein is patent with  antegrade flow.    Gallbladder: The gallbladder is nondistended. Layering echogenic  debris is seen within the gallbladder lumen. The gallbladder wall  measures up to 3 mm, similar to prior. No pericholecystic fluid.  Sonographic Yoon's negative. Redemonstration of tiny nondependent  avascular echogenic foci within the gallbladder lumen.    Bile Ducts: Both the intra- and extrahepatic biliary system are of  normal caliber.  The common bile duct measures 4 mm in diameter.    Right Kidney: Measures 11.3 cm in length with normal parenchymal  echogenicity and cortical thickness. No hydronephrosis.    Fluid: None in the visualized abdomen.    Visualized aorta and IVC within normal limits.      Impression    IMPRESSION:     1. Increased echogenicity of the hepatic parenchyma with mild  coarsened echotexture and questionable nodularity which may be seen  with chronic parenchymal liver disease. No suspicious focal hepatic  mass though scanning limited secondary to overlying bandages.  2. Cholelithiasis with redemonstration of borderline gallbladder wall  thickening, measuring up to 3 mm. No sonographic Yoon's sign,  pericholecystic fluid, or distention of the gallbladder to suggest  acute cholecystitis  3. Tiny nondependent avascular echogenic foci within the gallbladder  lumen,  may represent adenomyomatosis versus sub 5 mm polyps.    I have personally reviewed the examination and initial interpretation  and I agree with the findings.    TRINIDAD COMER MD         SYSTEM ID:  [id number]   IR Transcatheter Biopsy    Narrative    PROCEDURES  5/2/2023:  1. Ultrasound-guided right internal jugular venotomy.  2. Right hepatic vein catheterization for wedged hepatic and pullback  pressure measurements.  3. Right hepatic vein catheterization for transjugular liver biopsy.    CLINICAL HISTORY: 67-year-old male, pretransplant evaluation for heart  and liver transplant. Transjugular liver biopsy and portosystemic  pressure measurements requested.    COMPARISONS: No pertinent comparison exams    REFERRING PROVIDER: BRANNON GAVIN    STAFF RADIOLOGIST: Forrest Damon MD    FELLOW / RESIDENT / ASSISTANT: None    MEDICATIONS: The patient was placed on continuous monitoring. Vital  signs and sedation were monitored by the Interventional Radiology  nurse under the Attending Physician's supervision. 100 mcg fentanyl, 1  mg Versed administered intravenous. The patient remained stable  throughout the procedure.    ATTENDING FACE-TO-FACE SEDATION TIME: 38 minutes    FLUOROSCOPY TIME: 3.9 minutes    DOSE: 33.7 mGy.    PROCEDURE/FINDINGS: The patient understood the limitations,  alternatives, and risks of the procedure and requested the procedure  be performed. Both written and verbal consent were obtained.    The right neck was prepped and draped in usual sterile fashion. 1%  lidocaine without epinephrine was used for local anesthesia.    Preprocedure evaluation of the right internal jugular vein showed that  its patent and compressible. Under ultrasound guidance, right internal  jugular venotomy was made with the microcatheter needle. Ultrasound  image documenting right internal jugular venous patency and needle  venotomy was saved in the patient's record.    Needle ultimately exchanged for a 12 Greenlandic introducer sheath using  modified Seldinger technique.    Right hepatic vein catheterized with an MPA catheter.     Wedged hepatic pressure measurement was obtained at 38 mmHg. The  catheter was then pulled back and a venogram was performed to prove  free venous flow  around the catheter. Free hepatic vein pressure was  measured at 23 mmHg. Next, right atrial pressure measurements were  obtained through the 12 Peruvian access sheath measuring 21 mmHg.    Right hepatic vein catheterized again. 14-gauge stiffening cannula  advanced through the sheath and used to direct the biopsy needle  anteriorly.  Biopsies performed with a [employer] Quick-Core biopsy  needle through the stiffening cannula. 3 x 18-gauge core biopsies were  obtained and submitted to Pathology in formalin. Fluoroscopic image  documenting each biopsy needle pass was saved in the patient's record.    Sheath advanced over the stiffening cannula and stiffening cannula  removed.    Sheath removed. Pressure held over the venotomy site until hemostasis  was achieved. Sterile dressing applied. No immediate complication.      Impression    IMPRESSION:  1.  Pressure measurements:  Wedged hepatic (indirect portal) venous  pressure 38 mmHg, free hepatic venous pressure 23 mmHg, right atrial  pressure 21 mmHg    2. 3 x 18-gauge core biopsies obtained through the right hepatic vein  and submitted to Pathology in formalin.    3. Postprocedural care and observation. Patient to rest with head of  bed at least 30 degrees upright for at least an hour. Dressing may be  removed tomorrow. Patient may shower tomorrow.    DENNY BERG MD         SYSTEM ID:  [id number]   Echocardiogram Complete    Narrative    [id number]  [id number]  [id number]  [id number]^KATERINA^VERONICA     [facility]  Echocardiography Laboratory  [address] [place] [address]     Name: WOLFGANG LEACH  MRN: [id number]  : 1955  Study Date: 2023 09:38 AM  Age: 67 yrs  Gender: Male  Patient Location: [facility]  Reason For Study: Heart Failure - Left  Ordering Physician: VERONICA BORGES  Referring Physician: BRANNON GAVIN  Performed By: Antonette Corbin     BSA: 1.9 m2  Height: 67 in  Weight: 178  lb  ______________________________________________________________________________  Procedure  Complete Portable Echo Adult.  ______________________________________________________________________________  Interpretation Summary  Please refer to the EPIC report for measurements performed at different LVAD  speed settings.  HM3 at 5800RPM at baseline.  LAIDd 50mm.  Septum normal.  Aortic valve open with each systole.  ______________________________________________________________________________  Left Ventricle  Please refer to the EPIC report for measurements performed at different LVAD  speed settings. HM3 at 5800RPM at baseline.  LAIDd 50mm.  Septum normal.  Aortic valve open with each systole.  ______________________________________________________________________________  MMode/2D Measurements & Calculations  IVSd: 0.85 cm  LVIDd: 5.4 cm  LVIDs: 4.8 cm  LVPWd: 0.85 cm  FS: 10.6 %  LV mass(C)d: 164.5 grams  LV mass(C)dI: 85.5 grams/m2  RWT: 0.32     ______________________________________________________________________________  Report approved by: Graciela Tomlinson 04/28/2023 12:34 PM         Cardiac Catheterization    Narrative       No significant improvement in right sided filling pressures with   decrease in LVAD speed from 5800 rpm to 5000 rpm (RA pressure 12 >> 13 mm   Hg).     Increase in left sided filling pressures with decrease in LVAD speed to   5000 rpm (mean PCWP 12 mm Hg >> 18 mm Hg).     No significant improvement in right ventricular systolic dysfunction   and RV size on simultaneous TTE with decrease in LVAD speed to 5000 rpm.     Mild decrease in Jane [employer] cardiac output with decrease in LVAD speed;   SVO2 66% >> 61%.     Cardiac Device Check - Inpatient     Value    Date Time Interrogation Session [id number]    Implantable Pulse Generator  Medtronic    Implantable Pulse Generator Model [id number] [facility] AF MRI VR    Implantable Pulse Generator Serial Number [id number]    Type  Interrogation Session In Clinic    Clinic [facility]    Implantable Pulse Generator Type Defibrillator    Implantable Pulse Generator Implant Date 20200316    Implantable Lead  Medtronic    Implantable Lead Model 6935M Sprint Quattro Secure S MRI SureScan    Implantable Lead Serial Number [id number]    Implantable Lead Implant Date 20200316    Implantable Lead Polarity Type Tripolar Lead    Implantable Lead Location Detail 1 UNKNOWN    Implantable Lead Special Function 62cm length    Implantable Lead Location Right Ventricle    Glenn Setting Mode (NBG Code) VVIR    Glenn Setting Lower Rate Limit 60    Glenn Setting Maximum Sensor Rate 115    Glenn Setting Hysterisis Rate DISABLED    Lead Channel Setting Sensing Polarity Bipolar    Lead Channel Setting Sensing Anode Location Right Ventricle    Lead Channel Setting Sensing Anode Terminal Ring    Lead Channel Setting Sensing Cathode Location Right Ventricle    Lead Channel Setting Sensing Cathode Terminal Tip    Lead Channel Setting Sensing Sensitivity 0.3    Lead Channel Setting Pacing Polarity Bipolar    Lead Channel Setting Pacing Anode Location Right Ventricle    Lead Channel Setting Pacing Anode Terminal Ring    Lead Channel Setting Sensing Cathode Location Right Ventricle    Lead Channel Setting Sensing Cathode Terminal Tip    Lead Channel Setting Pacing Pulse Width 0.4    Lead Channel Setting Pacing Amplitude 2    Lead Channel Setting Pacing Capture Mode Adaptive    Zone Setting Type Category VF    Zone Setting Detection Interval 270    Zone Setting Detection Beats Numerator 30    Zone Setting Detection Beats Denominator 40    Zone Setting Type Category VT    Zone Setting Detection Interval Blank    Zone Setting Type Category VT    Zone Setting Detection Interval 460    Zone Setting Type Category VT    Zone Setting Detection Interval 500    Zone Setting Type Category ATRIAL_FIBRILLATION    Zone Setting Type Category AT/AF     Lead Channel Impedance Value 399    Lead Channel Impedance Value 304    Lead Channel Sensing Intrinsic Amplitude 9.625    Lead Channel Sensing Intrinsic Amplitude 10.25    Lead Channel Pacing Threshold Amplitude 0.75    Lead Channel Pacing Threshold Pulse Width 0.4    Battery Date Time of Measurements 82498126118285    Battery Status OK    Battery RRT Trigger 2.727    Battery Remaining Longevity 104    Battery Voltage 3.00    Capacitor Charge Type Reformation    Capacitor Last Charge Date Time [id number]    Capacitor Charge Time 3.773    Capacitor Charge Energy 18    Glenn Statistic Date Time Start 53533561650146    Glenn Statistic Date Time End 00200981696997    Glenn Statistic RV Percent Paced 7.22    Atrial Tachy Statistic Date Time Start 15527282699432    Atrial Tachy Statistic Date Time End 24939687982461    Atrial Tachy Statistic AT/AF Harris Percent 0.3    Therapy Statistic Recent Shocks Delivered 0    Therapy Statistic Recent Shocks Aborted 0    Therapy Statistic Recent ATP Delivered 0    Therapy Statistic Recent Date Time Start 94219186620950    Therapy Statistic Recent Date Time End 97713031204405    Therapy Statistic Total Shocks Delivered 0    Therapy Statistic Total Shocks Aborted 0    Therapy Statistic Total ATP Delivered 0    Therapy Statistic Total  Date Time Start 21169738709437    Therapy Statistic Total  Date Time End 37098961277460    Episode Statistic Recent Count 9    Episode Statistic Type Category AT/AF    Episode Statistic Recent Count 0    Episode Statistic Type Category SVT    Episode Statistic Recent Count 0    Episode Statistic Type Category VT    Episode Statistic Recent Count 0    Episode Statistic Type Category VF    Episode Statistic Recent Count 0    Episode Statistic Type Category VT    Episode Statistic Recent Count 0    Episode Statistic Type Category VT    Episode Statistic Recent Count 0    Episode Statistic Type Category VT    Episode Statistic Recent Date Time Start  83814605360023    Episode Statistic Recent Date Time End 41913781071610    Episode Statistic Recent Date Time Start 74964751179096    Episode Statistic Recent Date Time End 97014061385098    Episode Statistic Recent Date Time Start 39358797943224    Episode Statistic Recent Date Time End 50344606875491    Episode Statistic Recent Date Time Start 32375614047269    Episode Statistic Recent Date Time End 36443580200453    Episode Statistic Recent Date Time Start 28560508914033    Episode Statistic Recent Date Time End 46488648885322    Episode Statistic Recent Date Time Start 06694933233056    Episode Statistic Recent Date Time End 14685907672469    Episode Statistic Recent Date Time Start 70335935711131    Episode Statistic Recent Date Time End 05796708954604    Episode Statistic Total Count 242    Episode Statistic Type Category AT/AF    Episode Statistic Total Count 0    Episode Statistic Type Category SVT    Episode Statistic Total Count 0    Episode Statistic Type Category VT    Episode Statistic Total Count 0    Episode Statistic Type Category VF    Episode Statistic Total Count 0    Episode Statistic Type Category VT    Episode Statistic Total Count 0    Episode Statistic Type Category VT    Episode Statistic Total Count 2    Episode Statistic Type Category VT    Episode Statistic Total Date Time Start 43123430662509    Episode Statistic Total Date Time End 98318193356452    Episode Statistic Total Date Time Start 81066800280251    Episode Statistic Total Date Time End 26826089494276    Episode Statistic Total Date Time Start 12063716973064    Episode Statistic Total Date Time End 72142525628115    Episode Statistic Total Date Time Start 86709296413953    Episode Statistic Total Date Time End 39169214796896    Episode Statistic Total Date Time Start 94567881142402    Episode Statistic Total Date Time End 33385366079088    Episode Statistic Total Date Time Start 74414258973020    Episode Statistic Total Date Time  End 60590991099495    Episode Statistic Total Date Time Start 45209728579934    Episode Statistic Total Date Time End 96386583485695    Episode Identifier 244    Episode Type Category Monitor    Episode Date Time 60268451216083    Episode Duration 960    Episode Identifier 243    Episode Type Category Monitor    Episode Date Time 80231259972197    Episode Duration 360    Episode Identifier 242    Episode Type Category Monitor    Episode Date Time 20230329214710    Episode Duration 600    Episode Identifier 241    Episode Type Category Monitor    Episode Date Time 80868151567987    Episode Duration 720    Episode Identifier 240    Episode Type Category Monitor    Episode Date Time 84115155730440    Episode Duration 1,200    Episode Identifier 239    Episode Type Category Monitor    Episode Date Time 66931380096537    Episode Duration 840    Episode Identifier 238    Episode Type Category Monitor    Episode Date Time [id number]    Episode Duration 840    Episode Identifier 237    Episode Type Category Monitor    Episode Date Time 20230329030310    Episode Duration 360    Episode Identifier 236    Episode Type Category Monitor    Episode Date Time 20230329022310    Episode Duration 1,080    Narrative    Patient seen on 6B for evaluation and iterative programming of their ICD   per MD orders.     Device: [employer] [id number] Visia AF MRI VR  Normal device function  Mode: VVIR  bpm  : 7.2%  Intrinsic rhythm: VS @ 100 bpm  Thoracic Impedance: Below reference line suggesting possible intrathoracic   fluid accumulation.  Short V-V intervals: 0  Lead Trends Appear Stable: yes  Estimated battery longevity to RRT = 8.6 years  Atrial Arrhythmia: 9 episodes recorded as AT/AF lasting 6-20 minutes in   duration.  AF [place]: 0.3%  Anticoagulant: warfarin  Ventricular Arrhythmia: 0  Setting Changes: None    ALISTAIR Wang RN    Single lead ICD    I have reviewed and interpreted the device interrogation, settings,   programming  and nurse's summary. The device is functioning within normal   device parameters. I agree with the current findings, assessment and plan.     Cardiac Device Check - Inpatient     Value    Date Time Interrogation Session [id number]    Implantable Pulse Generator  Medtronic    Implantable Pulse Generator Model [id number] Visia AF MRI VR    Implantable Pulse Generator Serial Number [id number]    Type Interrogation Session In Clinic    Clinic Name [facility] [place]    Implantable Pulse Generator Type Defibrillator    Implantable Pulse Generator Implant Date 20200316    Implantable Lead  Medtronic    Implantable Lead Model 6935M [employer] Quattro Secure S MRI SureScan    Implantable Lead Serial Number [id number]    Implantable Lead Implant Date 20200316    Implantable Lead Polarity Type Tripolar Lead    Implantable Lead Location Detail 1 UNKNOWN    Implantable Lead Special Function 62cm length    Implantable Lead Location Right Ventricle    Glenn Setting Mode (NBG Code) VVIR    Glenn Setting Lower Rate Limit 60    Glenn Setting Maximum Sensor Rate 115    Glenn Setting Hysterisis Rate DISABLED    Lead Channel Setting Sensing Polarity Bipolar    Lead Channel Setting Sensing Anode Location Right Ventricle    Lead Channel Setting Sensing Anode Terminal Ring    Lead Channel Setting Sensing Cathode Location Right Ventricle    Lead Channel Setting Sensing Cathode Terminal Tip    Lead Channel Setting Sensing Sensitivity 0.3    Lead Channel Setting Pacing Polarity Bipolar    Lead Channel Setting Pacing Anode Location Right Ventricle    Lead Channel Setting Pacing Anode Terminal Ring    Lead Channel Setting Sensing Cathode Location Right Ventricle    Lead Channel Setting Sensing Cathode Terminal Tip    Lead Channel Setting Pacing Pulse Width 0.4    Lead Channel Setting Pacing Amplitude 2    Lead Channel Setting Pacing Capture Mode Adaptive    Zone Setting Type Category VF    Zone Setting  Detection Interval 270    Zone Setting Detection Beats Numerator 30    Zone Setting Detection Beats Denominator 40    Zone Setting Type Category VT    Zone Setting Detection Interval Blank    Zone Setting Type Category VT    Zone Setting Detection Interval 460    Zone Setting Type Category VT    Zone Setting Detection Interval 500    Zone Setting Type Category ATRIAL_FIBRILLATION    Zone Setting Type Category AT/AF    Lead Channel Impedance Value 399    Lead Channel Impedance Value 342    Lead Channel Sensing Intrinsic Amplitude 10.1    Lead Channel Pacing Threshold Amplitude 0.625    Lead Channel Pacing Threshold Pulse Width 0.4    Battery Date Time of Measurements 70936890604470    Battery Status OK    Battery RRT Trigger 2.727    Battery Remaining Longevity 102    Battery Voltage 2.99    Capacitor Charge Type Reformation    Capacitor Last Charge Date Time 09805345526027    Capacitor Charge Time 3.743    Capacitor Charge Energy 18    Glenn Statistic Date Time Start 47206568524761    Glenn Statistic Date Time End 49032617231632    Glenn Statistic RV Percent Paced 0    Atrial Tachy Statistic Date Time Start 56056855086389    Atrial Tachy Statistic Date Time End 30000791159366    Atrial Tachy Statistic AT/AF [place] Percent 0    Therapy Statistic Recent Shocks Delivered 0    Therapy Statistic Recent Shocks Aborted 0    Therapy Statistic Recent ATP Delivered 0    Therapy Statistic Recent Date Time Start 23568149394623    Therapy Statistic Recent Date Time End 25926238812790    Therapy Statistic Total Shocks Delivered 0    Therapy Statistic Total Shocks Aborted 0    Therapy Statistic Total ATP Delivered 0    Therapy Statistic Total  Date Time Start 96324997721455    Therapy Statistic Total  Date Time End 67409660755543    Episode Statistic Recent Count 0    Episode Statistic Type Category AT/AF    Episode Statistic Recent Count 0    Episode Statistic Type Category SVT    Episode Statistic Recent Count 0    Episode  Statistic Type Category VT    Episode Statistic Recent Count 0    Episode Statistic Type Category VF    Episode Statistic Recent Count 0    Episode Statistic Type Category VT    Episode Statistic Recent Count 0    Episode Statistic Type Category VT    Episode Statistic Recent Count 0    Episode Statistic Type Category VT    Episode Statistic Recent Date Time Start 14006877218903    Episode Statistic Recent Date Time End 01463874165199    Episode Statistic Recent Date Time Start 41704184021320    Episode Statistic Recent Date Time End 78713053640869    Episode Statistic Recent Date Time Start 44038954038753    Episode Statistic Recent Date Time End 71756863341126    Episode Statistic Recent Date Time Start 14246205392817    Episode Statistic Recent Date Time End 39018389611568    Episode Statistic Recent Date Time Start 78157656384953    Episode Statistic Recent Date Time End 87443973983187    Episode Statistic Recent Date Time Start 07033076789575    Episode Statistic Recent Date Time End 93391855864394    Episode Statistic Recent Date Time Start 87781131430711    Episode Statistic Recent Date Time End 05176766728196    Episode Statistic Total Count 242    Episode Statistic Type Category AT/AF    Episode Statistic Total Count 0    Episode Statistic Type Category SVT    Episode Statistic Total Count 0    Episode Statistic Type Category VT    Episode Statistic Total Count 0    Episode Statistic Type Category VF    Episode Statistic Total Count 0    Episode Statistic Type Category VT    Episode Statistic Total Count 0    Episode Statistic Type Category VT    Episode Statistic Total Count 2    Episode Statistic Type Category VT    Episode Statistic Total Date Time Start 80662809571831    Episode Statistic Total Date Time End 74478415502635    Episode Statistic Total Date Time Start 99873113806241    Episode Statistic Total Date Time End 59840884932657    Episode Statistic Total Date Time Start 95215060514363    Episode  Statistic Total Date Time End 20230501154533    Episode Statistic Total Date Time Start 20200316145425    Episode Statistic Total Date Time End 20230501154533    Episode Statistic Total Date Time Start 20200316145425    Episode Statistic Total Date Time End 20230501154533    Episode Statistic Total Date Time Start 20200316145425    Episode Statistic Total Date Time End 20230501154533    Episode Statistic Total Date Time Start 20200316145425    Episode Statistic Total Date Time End 20230501154533    Narrative    Patient seen in [address] [place] [address] for evaluation and iterative programming of their ICD per MD orders.    Device: Med[employer]onic [id number] Visia AF MRI VR  Normal Device Function.   Mode: VVIR  bpm  : 0%  Intrinsic rhythm: VS @ 100 bpm  Short V-V intervals: 0  Thoracic Impedance: Near reference line.   Lead Trends Appear Stable: Yes  Estimated battery longevity to RRT = 8.5 years. Battery voltage = 2.99 V.  Atrial arrhythmia: No episodes recorded. Patient has single ventricular lead. Per trends, patient had a sudden decrease in  around the beginning of April, along with a sudden increase in baseline Day/Night HRs. It is difficult to discern the cause of this change. Per chart review, patient has known history of pAF. R-R intervals appear regular during interrogation today.   AF burden: Device records 0%  Anticoagulant: Warfarin  Ventricular Arrhythmia: None  Setting changes: None    Plan: Continue inpatient evaluation and treatment.  JHONNY Yanez RN    Single lead ICD    I have reviewed and interpreted the device interrogation, settings, programming and nurse's summary. The device is functioning within normal device parameters. I agree with the current findings, assessment and plan.     *Note: Due to a large number of results and/or encounters for the requested time period, some results have not been displayed. A complete set of results can be found in Results Review.       Significant Results and Procedures        Discharge Medications   Discharge Medication List as of 5/3/2023  2:12 PM      START taking these medications    Details   empagliflozin (JARDIANCE) 10 MG TABS tablet Take 1 tablet (10 mg) by mouth daily, Disp-30 tablet, R-0, E-Prescribe         CONTINUE these medications which have CHANGED    Details   ciprofloxacin (CIPRO) 500 MG tablet Take 1 tablet (500 mg) by mouth daily, Disp-30 tablet, R-0, E-Prescribe      DAPTOmycin (CUBICIN) 500 MG injection Inject 10 mLs (500 mg) into the vein every 48 hours, No Print Out      hydrALAZINE (APRESOLINE) 50 MG tablet Take 1 tablet (50 mg) by mouth 3 times daily, Disp-90 tablet, R-0, E-Prescribe      hydrochlorothiazide (HYDRODIURIL) 50 MG tablet Take 2 tablets (100 mg) by mouth daily, Disp-60 tablet, R-0, E-Prescribe      levothyroxine (SYNTHROID/LEVOTHROID) 100 MCG tablet Take 1 tablet (100 mcg) by mouth every morning (before breakfast), Disp-30 tablet, R-0, E-Prescribe         CONTINUE these medications which have NOT CHANGED    Details   allopurinol (ZYLOPRIM) 100 MG tablet Take 2 tablets (200 mg) by mouth daily, Disp-60 tablet, R-1, E-Prescribe      amiodarone (PACERONE) 200 MG tablet TAKE 1 TABLET BY MOUTH ONCE DAILY, Disp-90 tablet, R-3, E-Prescribe      amLODIPine (NORVASC) 5 MG tablet TAKE 2 TABLETS BY MOUTH ONCE DAILY, Disp-180 tablet, R-3, E-Prescribe      aspirin (ASA) 81 MG EC tablet Take 1 tablet (81 mg) by mouth daily, Disp-90 tablet, R-3, E-Prescribe      B Complex-C-Folic Acid (RENAL) 1 MG CAPS Take 1 capsule by mouth daily, Disp-30 capsule, R-0, E-PrescribeFurther refills should go to primary care or nephrology      co-enzyme Q-10 200 MG CAPS Take 200 mg by mouth daily, Historical      finasteride (PROSCAR) 5 MG tablet Take 1 tablet (5 mg) by mouth daily Helps with urinary retention., Disp-30 tablet, R-0, E-Prescribe      isosorbide mononitrate (IMDUR) 120 MG 24 HR ER tablet Take 1 tablet (120 mg) by mouth daily for 90 days, Disp-90 tablet, R-0,  E-Prescribe      magnesium oxide (MAG-OX) 400 MG tablet Take 400 mg by mouth 2 times daily, Disp-30 tablet, R-1, Historical      methocarbamol (ROBAXIN) 500 MG tablet Take 1 tablet (500 mg) by mouth 3 times daily as needed for muscle spasms (Cramps), Disp-90 tablet, R-1, E-Prescribe      omeprazole (PRILOSEC) 20 MG DR capsule Take 20 mg by mouth daily, Historical      PARoxetine (PAXIL) 10 MG tablet Take 20 mg by mouth daily, Historical      potassium chloride ER (KLOR-CON M) 20 MEQ CR tablet Take 4 tablets (80 mEq) by mouth 2 times daily Take an additional 40 mEq on Tuesdays and Saturdays with IV Bumex, Disp-774 tablet, R-3, E-PrescribeAdditional Potassium      senna-docusate (SENOKOT-S/PERICOLACE) 8.6-50 MG tablet Take 1 tablet by mouth 2 times daily as needed for constipation, Historical      tamsulosin (FLOMAX) 0.4 MG capsule Take 0.8 mg by mouth every evening This med helps with urinary retention, Disp-30 capsule, R-0, Historical      torsemide (DEMADEX) 100 MG tablet Take 1 tablet (100 mg) by mouth 3 times daily, Disp-90 tablet, R-0, E-PrescribePlease resume torsemide at 100 mg TID instead of bumex that was prior prescribed      warfarin ANTICOAGULANT (COUMADIN) 4 MG tablet Take 1 tablet (4 mg) by mouth daily, Disp-120 tablet, R-3, E-Prescribe      zolpidem (AMBIEN) 5 MG tablet Take 5 mg by mouth nightly as needed for Insomnia, Historical         STOP taking these medications       dapagliflozin (FARXIGA) 5 MG TABS tablet Comments:   Reason for Stopping:         ferrous sulfate (FEROSUL) 325 (65 Fe) MG tablet Comments:   Reason for Stopping:         spironolactone (ALDACTONE) 25 MG tablet Comments:   Reason for Stopping:             Allergies   Allergies   Allergen Reactions     Heparin      HIT screen positive 2/14/20, reflex DAVINA negative; however heme recommended treating as if positive  HIT screen negative 2/11/20     Chlorhexidine Rash     Oxycodone Other (See Comments) and Itching

## 2023-05-03 NOTE — DISCHARGE INSTRUCTIONS
[facility] - resume outpatient infusion  Phone: [phone]   Fax: [phone]   I called pharmacist: Kay at 12:48am 5/3/23 and informed her :  IV Dapto will be same dose but is now every other day due to kidney function and is due Thursday, 5/4/23  IV Bumex will stay the same dose: 2 days/week  Lab draws once per week.  Pt usually arrives at 0900 for 10am infusion  PICC dressing due for change on 5/7/23.    Pt/wife independent with LVAD dressing change QD

## 2023-05-03 NOTE — PROGRESS NOTES
ANTICOAGULATION  MANAGEMENT: Discharge Review    Eliseo Tanner chart reviewed for anticoagulation continuity of care    Hospital Admission on 4/17-5/3/23 for hypervolemia. Volume overload which was continuing despite treatment outside the hospital and treated with IV diuretics. Over hospitalization, volume overload improved, but is likely not completely resolved.    Discharge disposition: Home    Results:    Recent labs: (last 7 days)     04/27/23  0708 04/28/23  0603 04/29/23  0503 04/30/23  0824 05/01/23  0520 05/02/23  0337 05/03/23  0555   INR 1.47* 1.58* 1.49* 1.75* 1.88* 1.79* 2.03*     Anticoagulation inpatient management:     4/17 4mg  4/18 4mg  4/19 4mg  4/20 2.5mg  4/21 1mg  4/22 Hold  4/23 1mg  4/24 2mg  4/25 3mg  4/26 3mg  4/27 4mg  4/28 5mg  4/29 6mg  4/30 Hold  5/1 6mg  5/2 6mg      less warfarin administered than maintenance regimen and more warfarin administered than maintenance regimen      Anticoagulation discharge instructions:     Warfarin dosing: home regimen continued   Bridging: No   INR goal change: No      Medication changes affecting anticoagulation:     STOP dapagliflozin; REPLACE with empagliflozin  - REDUCE ciprofloxacin dose to 500mg once daily  - STOP ferrous sulfate  - DECREASE hydralazine to 50mg three times daily (reduction from prior dose of 100mg three times daily)  - INCREASE hydrochlorothiazide to 100mg daily (increase from prior dose of 75mg twice per week)  - INCREASE levothyroxine to 100mcg daily (increase from prior dose of 88mcg daily)  - STOP spironolactone    Additional factors affecting anticoagulation: Yes: Bactrim discontinued 4/19/23.   Spouse reports that Eliseo has been having intermittent nosebleeds the past couple of nights. Maintenance dose continued at 26 mg/week for now (2 mg dose changed to Wed instead of Sun) until recheck next Wednesday at outside lab (not currently using home monitor)     PLAN     Recommend to check INR on 5/10/23 with other weekly  labs  Recommend to adjust dose to 2 mg every Wed; 4 mg all other days until next INR check     Spoke with spouse and Eliseo    Anticoagulation Calendar updated-standing lab orders faxed to outside lab [facility]     PACO MARQUEZ RN

## 2023-05-03 NOTE — PROGRESS NOTES
"DISCHARGE                      5/3/2023  ----------------------------------------------------------------------------  Discharged to: Home  Accompanied by: Wife  Discharge Instructions: Diet, activity, medications, follow up appointments, and when to call the MD reviewed with patient who voiced understanding.  Prescriptions: To be filled by patient's pharmacy in [place], [place] per pt's request; medication list reviewed & sent with pt  Follow Up Appointments: Arranged; information given  Belongings: All sent with pt, including all LVAD supplies and Nitroglycerin from inpatient pharmacy.   PIV: out.   *PICC remains in for outpatient antibiotics.   Telemetry: off  Pt exhibits understanding of above discharge instructions; all questions answered.  ID consulted on patient prior to discharge, verbal \"all clear\" from PA.     Discharge Paperwork: Signed, copied, and sent home with patient.     "

## 2023-05-03 NOTE — PHARMACY
[facility]  Parenteral ANtibiotic Review at Departure from Acute Care Collaborative Note     Patient: Eliseo Tanner  MRN: [id number]  Allergies: Heparin, Chlorhexidine, and Oxycodone    Current Location:  6C  OPAT to be provided by: Jonathan    [facility] ([phone])  Line Type: PICC    Diagnosis/Indications: Chronic LVAD driveline infection  Organism(s): MSSA, Pseudomonas aeruginosa  MRDO? No  Pending Cultures/Microbiological Tests: no      Inpatient ID involved in developing OPAT plan: Yes - discharge OPAT plan has no changes from ID provider, Dr. Marjan Redding, OPAT plan charted on 5/3/2023    Outpatient ID Follow-up: ID OPAT Clinic Referral Placed ([facility] Ph: [phone] and Fax: [phone]) - appointment scheduled  Designated Provider: Dr. Marlin Blanco    Antimicrobial Regimen / Route Anticipated  Duration Start Date Stop /  Reassess Date   Daptomycin 500 mg (~6 mg/kg ActBW)  (every 48 hours)/IV Indefinitely 12/17/2022 Indefinite therapy (suppression)   Ciprofloxacin 500 mg every 24 hours/PO Indefinitely 3/10/2023 Indefinite therapy (suppression)     Laboratory Tests and Monitoring Frequency: CBC with Diff, CMP Once Weekly    Imaging/Miscellaneous Monitoring: None    ID Pharmacist Interventions: None - consider increasing daptomycin frequency to every 24 hours if CrCl improves to > 30 mL/min                           Sarah Jimenez, PharmD, BCIDP  Pager: [phone]

## 2023-05-03 NOTE — PROGRESS NOTES
Care Management Discharge Note    Discharge Date: 05/03/2023       Discharge Disposition: Home    Discharge Services:  Outpatient Infusion diuretic and abx plan--refer to RNCC note for complete details    Discharge DME:  None     Discharge Transportation: Pt's wife is at the bedside     Private pay costs discussed: Not applicable    Does the patient's insurance plan have a 3 day qualifying hospital stay waiver?  N/A    PAS Confirmation Code:  N/A   Patient/family educated on Medicare website which has current facility and service quality ratings:      Education Provided on the Discharge Plan:  Yes   Persons Notified of Discharge Plans: Cards 2, LVAD coordinator, pt and his wife, at bedside  Patient/Family in Agreement with the Plan:  Pt and wife are not in agreement with recommendation to stay in the hospital and want to be discharged today. Pt expressed agreement to come back into the hospital even if it was Friday.     Handoff Referral Completed: No    Additional Information:  Cards 2 Provider, LVAD coordinator and writer met w/ pt and wife, to discuss discharge plan. Recommendation from team is that pt remain in the hospital for further diuresis, meet with ID and continue to monitor kidney function. Pt and wife politely decline staying and want to discharge today. It was expressed to them that we are concerned about continued fluid buildup, despite aggressive diuresis while he has been here and worsening kidney function and that long-term outpatient management will not be sustainable. Writer spent time with pt and wife after team had left room. Pt and wife having a very difficult time with goals of care conversations and simply are just not wanting to hear it and they are very clear about that. Writer did not push but did bring up hospice as an option should they get home and pt does not want to return back to the hospital and focus more on quality of life. Writer was clear with pt and wife that right now we will  resume his outpatient IV infusion regimen.     RNCC working on outpatient IV abx and diuretic plan with pt's local hospital--which he was doing prior to admit.         VERÓNICA ChavesSW

## 2023-05-03 NOTE — PLAN OF CARE
Admitted on 4/17/2023 for refractory hypervolemia. PMHx of chronic systolic heart failure secondary to NICM s/p HM 3 on 2/2020 moderate CAD, HTN, ABHINAV on CPAP, DM2, CKD Stage III, ANA. 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 LVAD numbers WDL, no chest pain or palpitations. Other VSS  Respiratory: Room air. Sats > 92%. Uses CPAP when sleeping. Pt had nosebleeds throughout the night. Nasal decongestant spray given (Pt states that it helped before)   GI/: Voids spontaneously with adequately UOP. Regular BM   Diet/Appetite: 2g Na diet with 2L FR  Skin: LVAD driveline site wound. Dressing changes daily (Per WOC instructions). Extensively bruised on bilateral UE  Endocrine/Electrolytes: BG checks ACHS. Replaced Potassium with evening med pass (K and Mg on high replacement protocols)   LDAs: SL PICC and PIV saline locked  Activity: Up ad tiffanie   Pain: Denies pain      Plan: Possible discharge today

## 2023-05-03 NOTE — CONSULTS
[facility]SULT[facility]     Patient:  Eliseo Tanner   Date of birth 1955, Medical record number [id number]  Date of Visit:  05/03/2023  Date of Admission: 4/17/2023  Consult Requester:Daniel Gomes MD          Assessment and Recommendations:   ASSESSMENT:  1. Chronic LVAD driveline infection   1. s/p I&D of fluid collection on 12/11/22; Cx +MSSA (tetra-R). 12/7 outside culture with both MRSA and MSSA  2. Has been on long courses of daptomycin for MSSA - recent attempt at Bactrim suppression changed back to daptomycin due to increased creatinine  3. Cultures from 3/2/23 with Pseudomonas as well as MSSA - started on ciprofloxacin  4. Cultures from 3/24 with cipro resistant pseudomonas but clinically improving, now stable so treatment was not changed  2. Hx MSSA bacteremia (11/2020) secondary to MSSA driveline infection  3. History of pre-op Proteus and Enterococcus bacteremia  4. Hx severe Legionella pneumonia (serogroup 1L) c/b cardiogenic shock, renal failure requiring CRRT, and resp failure requiring intubation, s/p azithromycin  5. 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    MSSA driveline infection with hx of breakthrough on cefadroxil and cephalexin, increased drainage and fluid collection in 12/2022 managed with I&D and course of daptomycin. Attempted to transition to Bactrim suppression in 3/2023 but developed BERNARDA so has remained on Daptomycin since the 12/2022 admission. Culture 3/2/23 with Pseudomonas x2 (pan-S) and MSSA x2 strains (both R: tetracycline; 1 with high daptomycin DONTA). Ciprofloxacin was added due to increased drainage. 3/24  cultures with Pseudomonas x3 strains (all FQ resistant). Patient is afebrile, WBC WNL 6-8K. CT (4/23/23) with soft tissue thickening along driveline but no abscess. Has small amount of yellow drainage daily, this has been stable. No increased redness, pain, or swelling at site.    Remains stable from infectious standpoint. Will continue daptomycin for MSSA component of infection. Though Pseudomonas is now resistant to fluoroquinolones, driveline site remains stable so will continue to use ciprofloxacin for suppression as it is the only oral option and at this time would like to reserve additional anti-Pseudomonas antimicrobials for breakthrough.     RECOMMENDATION:  1. Continue daptomycin currently 500mg (rounded from 6mg/kg) IV q48hrs   2. Continue ciprofloxacin 500mg PO q24hrs  3. Weekly labs (fax to [phone]): CBC w/ diff, CMP, CK  4. Scheduled for follow up on 5/12/23  5. ID will sign off at this time, please don't hesitate to contact the [facility] ID team should further questions arise.      Thank you for this consult. ID will continue to follow.     Patient was discussed with Dr. Millard.     Anaid Redding PA-C  Pronouns: she/her/hers  Infectious Diseases  Pager # [phone]     60 MINUTES SPENT BY ME on the date of service doing chart review, history, exam, documentation & further activities per the note.      Primary team:  Place order panel  [facility] Pharm[facility] ([facility]) Review and ID Care Transition   Place imaging order(s) requested above prior to discharge.  Contact ID teams about missed ID clinic appointments and/or ongoing therapy needs.    Prolonged Parenteral/Oral Antibiotic Recommendations and ID Follow up  This template provides final ID recommendations as of this date.     Infectious Diseases Indication: MSSA and Pseudomonas driveline infection    Antibiotic Information  Name of Antibiotic Dose of Antibiotic1 Anticipated duration Effective start date2 End date   daptomycin 6mg/kg IV q48hrs   Indefinitely, suppression 12/2022 N/A   ciprofloxacin 500mg PO q24hrs Indefinitely, suppression 3/10/23 N/A          1.Dose of antibiotic will need to be renally adjusted if creatinine clearance changes  2.Effective start date is the date of adequate therapy with appropriate spectrum    Method of antibiotic delivery:PICC line.Is the line being used for another indication besides antimicrobials? No     Weekly labs required: CBC with diff, CMP and CK. Dr. Blanco will follow labs at discharge until ID follow up. Fax labs to ID clinic.    Are there pending microbial tests: No     Imaging for ID follow up: ID Imaging: No.     Follow up: Scheduled for 5/12    ID provider route note: [facility] RN Care Coordinator [facility] ID Clinic Information:  Phone: [phone]  Fax: [phone] (Attention Nilsa Toledo)    ________________________________________________________________    Consult Question: LVAD ID clinic patient usually seen via video due to distance from [place]. He is on daptomycin and ciprofloxacin for driveline infection. Patient's cardiologist and LVAD ID physician would like him to be seen by inpatient ID team prior to discharge.  Admission Diagnosis: Hypervolemia [E87.70]  Chronic systolic congestive heart failure (H) [I50.22]         History of Present Illness:   Eliseo Tanner is a 67 year old male with past medical history significant for CHF from NICM s/p HM3 and ICD placement (2/18/20), DM, CKD, chronic MSSA driveline infection on IV Daptomycin suppression since 12/2022, Pseudomonas driveline infection (3/2/23) on Cipro who was admitted on 4/17/23 for management of volume overload.    Eliseo was admitted with hypervolemia- abdominal and lower extremity swelling with 12lb weight gain. He reports that LVAD site has been stable. Has a small amount of yellow/green drainage on dressings with no increase in amount or appearance of drainage. Site has not been red,  tender, or swollen. Denies fever, chills, rashes, nausea, vomiting, joint pain, dyspnea. He did have diarrhea early in hospital stay, which is improving (c.diff negative).    Brief driveline infection history:  10/21-11/18/22 completed course of IV cefazolin for increased driveline drainage with MSSA (tetracycline R) on cultures. This improved but did not fully resolve drainage per patient. He was transitioned to PO Bactrim for suppression on 11/19, renally adjusted to 1 single strength daily, but had increased purulent drainage noted 1 week later. Previous suppressive regimens of cephalexin and cefadroxil with similar breakthrough drainage despite in vitro susceptibilities. He went to a local facility on 12/7/22 where cultures were obtained and grew MRSA and got transferred to [facility] for LVAD infection. CT C/A/P with LVAD driveline showing surrounding fluid along tract increased from prior, no organized fluid collection or significant inflammatory changes and trace ascites. He went to the OR on 12/11/22 for I&D. Cultures grew MSSA.  He was started empirically on micafungin x1, vancomycin, and zosyn, then narrowed to cefazolin monotherapy when cultures returned with MSSA (tetracycline resistant). He was subsequently broadened to dapto a few days later given the OSH cultures from 12/7/22 growing MRSA. A wound vac was placed.     He has remained on daptomycin since December 2022. Trial of Bactrim suppression in March 2023 but developed BERNARDA so daptomycin was resumed. Worsening drainage in March 2023, culture (3/2) with MSSA x2 (one with high Dapto DONTA), and Pseudomonas x2. Was started on Cipro on 3/10 in addition to Dapto. CT (3/24/23) with fluid along driveline without discreet or organized collection. CT repeated 4/23 with thickening along driveline, no fluid collections.     3/24/23 culture:      3/2/23 culture:             Review of Systems:   See pertinent positives and negatives in HPI.         Past Medical History:      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)     MSSA     Legionella pneumonia (H)      LVAD (left ventricular assist device) present (H)      MSSA bacteremia 11/2020            Past Surgical History:     Past Surgical History:   Procedure Laterality Date     ANESTHESIA CARDIOVERSION N/A 02/28/2020    Procedure: ANESTHESIA, FOR CARDIOVERSION;  Surgeon: GENERIC ANESTHESIA PROVIDER;  Location: [facility] OR     CV CARDIOMEMS WITH RIGHT HEART CATH N/A 09/20/2022    Procedure: Pulmonary Arterial Pressure Sensor Placement CPT Codes to be cleared by financial securing for this implant. [id number] and ;  Surgeon: Dalton Baeza MD;  Location: [facility] HEART CARDIAC CATH LAB     CV CENTRAL VENOUS CATHETER PLACEMENT N/A 02/13/2020    Procedure: Central Venous Catheter Placement;  Surgeon: Chente Moss MD;  Location: [facility]     CV INTRA AORTIC BALLOON N/A 02/07/2020    Procedure: Intra-Aortic Balloon Pump Insertion;  Surgeon: Jose Baldwin MD;  Location: [facility]     CV INTRA AORTIC BALLOON N/A 02/13/2020    Procedure: Intra-Aortic Balloon Pump Insertion;  Surgeon: Chente Moss MD;  Location: [facility]     CV RIGHT HEART CATH MEASUREMENTS RECORDED N/A 09/21/2020    Procedure: CV RIGHT HEART CATH;  Surgeon: Dalton Baeza MD;  [facility]: [facility]     CV RIGHT HEART CATH MEASUREMENTS RECORDED N/A 01/07/2021    Procedure: Right Heart Cath;  Surgeon: Chun Ball MD;  Location: [facility]     CV RIGHT HEART CATH MEASUREMENTS RECORDED N/A 02/10/2022    Procedure: CV RIGHT HEART CATH;  Surgeon: Dalton Baeza MD;  Location: [facility]     CV RIGHT HEART CATH MEASUREMENTS RECORDED N/A  2022    Procedure: Right Heart Catheterization [[id number]];  Surgeon: Dalton Baeza MD;  Location:  HEART CARDIAC CATH LAB     CV RIGHT HEART CATH MEASUREMENTS RECORDED N/A 2022    Procedure: Right Heart Cath;  Surgeon: Chente Moss MD;  Location:  HEART CARDIAC CATH LAB     CV RIGHT HEART CATH MEASUREMENTS RECORDED N/A 2023    Procedure: Right Heart Catheterization;  Surgeon: Aaliyah Ficsher MD;  Location:  HEART CARDIAC CATH LAB     CV SWAN LUCIANA PROCEDURE N/A 2020    Procedure: [place] Luciana Procedure;  Surgeon: Chente Moss MD;  Location:  HEART CARDIAC CATH LAB     EP ICD Bilateral 2020    Procedure: EP ICD;  Surgeon: Dali Day MD;  Location:  HEART CARDIAC CATH LAB     INCISION AND DRAINAGE CHEST WASHOUT, COMBINED N/A 2022    Procedure: INCISION AND DRAINAGE OF DRIVELINE;  Surgeon: Mac Jaramillo MD;  Location: [facility] OR     INSERT VENTRICULAR ASSIST DEVICE LEFT (HEARTMATE II) N/A 2020    Procedure: INSERTION, LEFT VENTRICULAR ASSIST DEVICE (HEARTMATE III);  Surgeon: Mac Jaramillo MD;  Location: [facility] OR     IR CVC TUNNEL PLACEMENT > 5 YRS OF AGE  2021     IR CVC TUNNEL REMOVAL RIGHT  2021     IR TRANSCATHETER BIOPSY  2023     MIDLINE INSERTION - DOUBLE LUMEN Left 12/15/2022    left basilic 5 fr dl midline 20 cm     THORACOSCOPY Right 2020    Procedure: RIGHT VIDEO-ASSISTED THORASCOPIC SURGERY, EVACUATION OF HEMOTHORAX, PLACEMENT OF CHEST TUBES;  Surgeon: William Gan MD;  [facility]            Family History:   Reviewed and non-contributory.   No family history on file.         Social History:     Social History     Tobacco Use     Smoking status: Former     Types: Cigarettes     Quit date: 1994     Years since quittin.1     Smokeless tobacco: Never   Vaping Use     Vaping status: Not on file   Substance Use Topics     Alcohol use: Not Currently     History   Sexual  Activity     Sexual activity: Not on file            Current Medications:       allopurinol  200 mg Oral Daily     amiodarone  200 mg Oral Daily     amLODIPine  10 mg Oral Daily     aspirin  81 mg Oral Daily     ciprofloxacin  500 mg Oral Q24H BRITTANI     DAPTOmycin (CUBICIN) 500 mg in sodium chloride 0.9 % 100 mL intermittent infusion  6 mg/kg Intravenous Q48H     empagliflozin  10 mg Oral Daily     [Held by provider] ferrous sulfate  325 mg Oral Daily with breakfast     finasteride  5 mg Oral Daily     hydrALAZINE  50 mg Oral TID     hydrochlorothiazide  100 mg Oral Daily     insulin aspart  1-3 Units Subcutaneous TID AC     insulin aspart  1-3 Units Subcutaneous At Bedtime     isosorbide mononitrate  120 mg Oral Daily     lactobacillus rhamnosus (GG)  1 capsule Oral BID     levothyroxine  100 mcg Oral QAM AC     magnesium oxide  400 mg Oral BID     methocarbamol  500 mg Oral TID     multivitamin RENAL  1 tablet Oral Daily     pantoprazole  40 mg Oral Daily     PARoxetine  20 mg Oral Daily     potassium chloride  40 mEq Oral TID     tamsulosin  0.8 mg Oral QPM     torsemide  100 mg Oral TID     warfarin ANTICOAGULANT  4 mg Oral ONCE at 18:00     Warfarin Therapy Reminder  1 each Oral See Admin Instructions            Allergies:     Allergies   Allergen Reactions     Heparin      HIT screen positive 2/14/20, reflex DAVINA negative; however heme recommended treating as if positive  HIT screen negative 2/11/20     Chlorhexidine Rash     Oxycodone Other (See Comments) and Itching            Physical Exam:   Vitals were reviewed  Patient Vitals for the past 24 hrs:   BP Temp Temp src Pulse Resp SpO2 Weight   05/03/23 0725 93/70 98.4  F (36.9  C) Oral 100 18 95 % --   05/03/23 0500 -- -- -- -- -- -- 85.5 kg (188 lb 9.6 oz)   05/03/23 0405 92/81 97.5  F (36.4  C) Oral 99 18 97 % --   05/03/23 0015 97/60 97.7  F (36.5  C) Oral 98 16 97 % --   05/02/23 1900 108/78 98.4  F (36.9  C) Oral 51 16 95 % --   05/02/23 1507 -- 97.5  F  (36.4  C) Oral 98 18 97 % --       Physical Examination:  GENERAL:  Awake, alert, oriented. Sitting up on edge of bed in NAD. Wife present during visit.   HEENT:  Head is normocephalic, atraumatic   EYES:  Eyes have anicteric sclerae without conjunctival injection or drainage.    LUNGS:  Clear to auscultation bilateral. No wheeze, rhonchi, or crackles.  CARDIOVASCULAR:  +LVAD hum. 1-2+ edema bilat LE  SKIN:  No acute rashes on visible surfaces, ecchymosis on forearms. PICC in place. No stigmata of endocarditis.  NEUROLOGIC:  Grossly nonfocal. Active x4 extremities         Laboratory Data:     Inflammatory Markers    Recent Labs   Lab Test 12/08/22  1256 08/17/21  0807 02/16/21  2219 02/15/21  1910 02/11/21  0838 12/17/20  0756 12/14/20  0815 11/05/20  0000 09/21/20  1438   SED 49*  --  72* 47*  --   --   --   --   --    CRP  --  4.9 270.0* 270.0* 8.6 8.0 6.1 54.9 6.6       Hematology Studies    Recent Labs   Lab Test 05/03/23  0555 05/02/23  0337 05/01/23  0520 04/30/23  0824 04/29/23  0503 04/28/23  1047 04/28/23  1013 04/28/23  0603 03/09/21  0510 03/07/21  0543 03/06/21  0430 03/05/21  0400 03/04/21  0405 03/03/21  0455 03/02/21  0401   WBC 6.7 7.7 7.2 7.3 8.7  --   --  7.9   < > 4.5 4.4 5.4 5.6 7.0 7.3   ANEU  --   --   --   --   --   --   --   --   --  2.6 2.8 3.6 3.8 5.3 5.6   AEOS  --   --   --   --   --   --   --   --   --  0.3 0.2 0.2 0.1 0.1 0.1   HGB 9.7* 9.7* 9.5* 9.7* 10.1* 9.5*   < > 10.2*   < > 7.9* 8.0* 7.7* 8.2* 8.1* 9.2*   MCV 94 93 94 97 95  --   --  96   < > 82 82 81 81 80 82    206 200 202 223  --   --  210   < > 208 196 210 224 236 218    < > = values in this interval not displayed.       Metabolic Studies     Recent Labs   Lab Test 05/03/23  0555 05/02/23  1807 05/02/23  1100 05/02/23  0337 05/01/23  1944 05/01/23  1317 05/01/23  0520 04/30/23  0824 04/29/23  2339 04/29/23  2154   *  --   --  129*  --   --  128* 127*  --  126*   POTASSIUM 2.9* 4.2 3.3* 3.1*  3.1* 3.4   < > 2.9*  3.5   < > 5.4*   CHLORIDE 94*  --   --  94*  --   --  93* 96*  --  97*   CO2 22  --   --  21*  --   --  20* 16*  --  16*   BUN 83.5*  --   --  82.0*  --   --  82.2* 85.0*  --  84.1*   CR 2.59*  --   --  2.66*  --   --  2.76* 3.07*  --  3.21*   GFRESTIMATED 26*  --   --  25*  --   --  24* 21*  --  20*    < > = values in this interval not displayed.       Hepatic Studies    Recent Labs   Lab Test 04/28/23  0603 04/25/23  0609 04/23/23  0620 03/24/23  0749 03/23/23  1735 03/23/23  0732 03/02/23  0832 02/07/23  1826   BILITOTAL  --  0.7 0.6 0.7  0.6 0.6 0.6 0.9 0.5   ALKPHOS  --  188* 188* 216* 289* 307* 339* 310*   ALBUMIN 3.1* 3.0* 3.0* 2.8* 3.1* 3.2* 3.5 3.3*   AST  --  112*  --  141* 146* 148* 133* 122*   ALT  --  77* 86* 85* 89* 92* 88* 84*       Microbiology:  Culture   Date Value Ref Range Status   03/24/2023 3+ Pseudomonas aeruginosa (A)  Final   03/24/2023 3+ Pseudomonas aeruginosa (A)  Final   03/24/2023 3+ Pseudomonas aeruginosa (A)  Final   03/24/2023 1+ Staphylococcus warneri (A)  Final     Comment:     Susceptibilities not routinely done, refer to antibiogram to view typical susceptibility profiles   03/02/2023 1+ Pseudomonas aeruginosa (A)  Final   03/02/2023 1+ Pseudomonas aeruginosa (A)  Final   03/02/2023 1+ Staphylococcus aureus (A)  Corrected     Comment:     Susceptibility testing requested by Sunni Jimenez pharmacist ([id number]) to check slightly elevated vanco result. 3.8.23 at 1047  Susceptibility testing requested by Sunni Jimenez, pharmacist for dalbavancin.    03/02/2023 1+ Staphylococcus aureus (A)  Final   03/02/2023 No anaerobic organisms isolated  Final   12/11/2022 No anaerobic organisms isolated  Final   12/11/2022 No Growth  Final   12/11/2022 1+ Staphylococcus aureus (A)  Final     Comment:     Susceptibilities done on previous cultures   12/11/2022 1+ Staphylococcus aureus (A)  Final     Comment:     Susceptibilities done on previous cultures   12/08/2022 No Growth  Final   12/08/2022 2+  Staphylococcus aureus (A)  Final   12/08/2022 2+ Staphylococcus aureus (A)  Final   12/08/2022 No Growth  Final   10/18/2022 2+ Staphylococcus aureus (A)  Final   10/18/2022 2+ Staphylococcus aureus (A)  Final   09/20/2022 2+ Staphylococcus aureus (A)  Final   09/20/2022 1+ Normal freeman  Final   09/20/2022 No anaerobic organisms isolated  Final   02/10/2022 1+ Staphylococcus aureus (A)  Final   02/10/2022 1+ Cutibacterium (Propionibacterium) acnes (A)  Final     Comment:     Susceptibility testing of Cutibacterium (Propionibacterium) species is not done from this source. This organism is susceptible to penicillin and cefotaxime and most are susceptible to clindamycin.   08/17/2021 No Growth  Final   08/17/2021 No Growth  Final   08/17/2021 1+ Staphylococcus aureus (A)  Final   08/17/2021 No anaerobic organisms isolated  Final       Urine Studies    Recent Labs   Lab Test 04/18/23  0548 02/16/21  0225 11/17/20  0825 02/22/20  0944 02/13/20  0334   LEUKEST Negative Negative Negative Small* Small*   WBCU <1 0 0 2 81*       Vancomycin Levels    Recent Labs   Lab Test 12/10/22  1948 02/16/20  0400 02/14/20  0331   VANCOMYCIN 11.3 18.5 16.2       Hepatitis B Testing   Recent Labs   Lab Test 04/27/23  0708 02/28/21  1155 02/12/20  0440   HBCAB Nonreactive  --  Nonreactive   HEPBANG Nonreactive Nonreactive Nonreactive     Hepatitis C Testing     Hepatitis C Antibody   Date Value Ref Range Status   04/27/2023 Nonreactive Nonreactive Final   02/12/2020 Nonreactive NR^Nonreactive Final     Comment:     Assay performance characteristics have not been established for newborns,   infants, and children     02/08/2020 Nonreactive NR^Nonreactive Final     Comment:     Assay performance characteristics have not been established for newborns,   infants, and children       Respiratory Virus Testing    No results found for: RS, FLUAG          Imaging:     US abdominal limited (4/29/23)  IMPRESSION:      1. Increased echogenicity of the  hepatic parenchyma with mild  coarsened echotexture and questionable nodularity which may be seen  with chronic parenchymal liver disease. No suspicious focal hepatic  mass though scanning limited secondary to overlying bandages.  2. Cholelithiasis with redemonstration of borderline gallbladder wall  thickening, measuring up to 3 mm. No sonographic Yoon's sign,  pericholecystic fluid, or distention of the gallbladder to suggest  acute cholecystitis  3. Tiny nondependent avascular echogenic foci within the gallbladder  lumen,  may represent adenomyomatosis versus sub 5 mm polyps.       CT Chest/Abd/Pelvis w/o contrast (4/23/23)  IMPRESSION:   1. LVAD in place with stable minimal soft tissue thickening at the  driveline exit site in the abdomen wall. No collection along the drive  line.    2. No acute abnormality in the chest, abdomen or pelvis.  3. Stable trace right pleural effusion. Stable chronic bilateral  centrilobular groundglass opacities likely representing smoking  associated respiratory bronchiolitis.  4. Mild nodular appearance of the liver may indicate chronic  parenchymal disease. Recommend correlation with laboratory parameters  5. Mild ascites.  6. Cholelithiasis without cholecystitis.    PREVIOUS IMAGING  CT Chest/Abd/Pelvis (3/24/23)  IMPRESSION:   1. Small right pleural effusion and adjacent compressive atelectasis.  2. No acute pathology is visualized within the abdomen or pelvis. No  CT findings to explain the patient's abdominal pain.  3. LVAD with minimal surrounding fluid along the drive line in the  subcutaneous soft tissues of the anterior right chest similar to  prior. No discrete organized fluid collection or significant  inflammatory change.  4. Prominent bilateral inguinal lymph nodes have increased in size  from prior, favored reactive.  5. Additional chronic and incidental findings as detailed in the body  report are stable from prior.    CT Chest/Abd/Pelvis (12/8/22)  IMPRESSION:    1. LVAD drive line with surrounding fluid in the along the tract in  the subcutaneous right anterior chest wall increased from previous.  Trace fluid surrounding the LVAD outflow tract , similar to prior. No  organized fluid collection or significant associated inflammatory  changes.  2. Left inguinal lymphadenopathy and stable prominent mediastinal  lymph nodes, likely reactive  3. Cholelithiasis without evidence of cholecystitis. Mild intrahepatic  biliary ductal dilation.  4. Trace ascites

## 2023-05-03 NOTE — PROGRESS NOTES
ANTICOAGULATION  MANAGEMENT     Interacting Medication Review    Interacting medication(s): Ciprofloxacin (Cipro) with warfarin.    Duration: Long-term    Indication: Prophylaxis    New medication?: No, long term medication. No affect on INR anticipated at this time.   Refill of medication     PLAN        Patient was not contacted-Currently inpatient    No adjustment to anticoagulation calendar was required-currently inpatient-further review of medications at time of discharge.    Plan made per [facility] anticoagulation protocol and per LVAD protocol    PACO MARQUEZ RN  Anticoagulation Clinic

## 2023-05-04 NOTE — TELEPHONE ENCOUNTER
MTM referral from: Transitions of Care (recent hospital discharge or ED visit)    MTM referral outreach attempt #1 on May 4, 2023 at 9:10 AM      Outcome: Patient is not interested at this time , will route to MTM Pharmacist/Provider as an FYI. Thank you for the referral.     Marjan Salamanca - [facility]

## 2023-05-05 NOTE — PROGRESS NOTES
[facility] - CardioMEMS Reading Review     May 7, 2023        CardioMEMS reviewed and routed to patient's provider, Nader Cisneros     Current diuretic: Torsemide 100 mg TID, hydrochlorothiazide 100 mg daily, and IV bumex three times weekly    Current potassium chloride dose:  Potassium 40mEq TID     Symptoms: Fatigued, still has fluid from when he was discharged, no worse than when he came home per wife. Feels better every day.     Weights: 5/5 187 lbs, 5/7 184 lbs      Changes to plan of care today: Labs obtained on 5/6, resulted today     Current Threshold parameters: 21 - 24     Today's Waveform:      Readings: Patient discharged 5/3          [facility] Date Wedge Pressure MEMS PAD during cath  (average of the 3 values)      Sept 20, 2022 w/MEMS implant       15 mmHg    16 mmHg

## 2023-05-05 NOTE — PROGRESS NOTES
Eliseo reportedly was discharged from [facility] on 5/3. He was told to get labs drawn on 5/5. I called Eliseo to find out what time he was planning to get labs drawn today. He said he was getting them on Saturday when he is at the hospital to get an antibiotic infusion. He reported feeling good today but just tired and with the same mucus-like stuff he has been coughing up for weeks.    I advised Eliseo to come to the ED if he feels feverish, the chills or has any heart failure symptoms like shortness of breath. I asked him to call the VAD coordinator on call, too.    I spoke with staff at the [facility] lab. I faxed them lab orders. They said they will coordinate getting Eliseo's labs drawn on Saturday. I asked the weekend VAD coordinator to follow up on these lab results on Saturday.

## 2023-05-08 NOTE — PROGRESS NOTES
ANTICOAGULATION MANAGEMENT     Eliseo Tanner 67 year old male is on warfarin with subtherapeutic INR result. (Goal INR 1.7-2.3)    Recent labs: (last 7 days)     05/06/23  1519   INR 1.6*       ASSESSMENT       Source(s): Patient/Caregiver Call        Warfarin doses taken: Warfarin taken as instructed. Patient recently discharged from the hospital and was dosed differently.     Diet: No new diet changes identified    Medication/supplement changes: Discontinue Bactrim and decreased Cipro to 500mg Daily     New illness, injury, or hospitalization: recently discharged from the hospital due to hypervolemia     Signs or symptoms of bleeding or clotting: Denies anymore nose bleeds    Previous result: Therapeutic last 2(+) visits    Additional findings: Next INR patient will test with his home meter          PLAN     Recommended plan for ongoing change(s) affecting INR     Dosing Instructions: Continue your current warfarin dose with next INR in 4 days       Summary  As of 5/8/2023    Full warfarin instructions:  2 mg every Wed; 4 mg all other days   Next INR check:  5/10/2023             Telephone call with Eliseo who verbalizes understanding and agrees to plan and who agrees to plan and repeated back plan correctly    Patient to recheck with home meter    Education provided:     None required    Plan made per [facility] anticoagulation protocol and per LVAD protocol    Luiza Perkins RN  Anticoagulation Clinic  5/8/2023    _______________________________________________________________________     Anticoagulation Episode Summary     Current INR goal:  1.7-2.3   TTR:  47.1 % (9.9 mo)   Target end date:  Indefinite   Send INR reminders to:  ANTICOAG LVAD    Indications    LVAD (left ventricular assist device) present (H) [Z95.811]  Chronic systolic congestive heart failure (H) [I50.22]  Paroxysmal atrial fibrillation (H) [I48.0]           Comments:  Follow VAD Anticoag protocol:Yes: HeartMate 3   Bridging: Call MD each time   Date  VAD placed: 2/18/2020 5/6/22 [employer] [facility]         Anticoagulation Care Providers     Provider Role Specialty Phone number    Nader Cisneros MD Referring Cardiovascular Disease [phone]

## 2023-05-08 NOTE — TELEPHONE ENCOUNTER
[facility] Call Center    Phone Message    May a detailed message be left on voicemail: yes     Reason for Call: Other: Oliva from [facility] pharmacy would like a call back to discus refill on medication pt needs from his hospital F/U , she did not leave a detail on which meds just that she would like a call back     Action Taken: Message routed to:  [facility] Cardio    Travel Screening: Not Applicable

## 2023-05-08 NOTE — PROGRESS NOTES
[facility] - CardioMEMS Reading Review    May 8, 2023, 1426   CardioMems period: 4/20/2023 - 5/19/23      CardioMEMS reviewed and routed to patient's provider, Laine BARRY NP.     Current diuretic:  Torsemide 100 TID, Hydrochlorothiazide 100mg daily, Bumex 5mg IV Monday and Friday  Current potassium chloride dose:  Potassium 80mEq BID w/ extra 40mEq on Mondays and Fridays    Symptoms: fatigue  Weights: Today, going back: 181 lbs, 184, 184    Changes to plan of care today: No change    Current Threshold parameters: 21 - 24    Today's Waveform:       Readings:         RHC Date Wedge Pressure MEMS PAD during cath  (average of the 3 values)     Sept 20, 2022 w/MEMS implant     15 mmHg   16 mmHg

## 2023-05-09 NOTE — PROGRESS NOTES
D/I:  Called pt post hospital discharge.  Discussed medication changes, plans for repeat labs, and follow up.  Pt reports weight of 187, ongoing fatigue, but happiness in being home from the hospital.  Encouraged pt to page the oncall if weights continue to rise or symptoms worsen.  A:  Post hospital check in  P:  Pt verbalized understanding of the instructions given.  Will call VAD coordinator with further needs and questions.

## 2023-05-09 NOTE — TELEPHONE ENCOUNTER
[facility] Call Center    Phone Message    May a detailed message be left on voicemail: no     Reason for Call: Other:    Oliva from [facility] pharmacy called again. She would like a call back. Patient is supposed to start medication tomorrow. Oliva [phone]    Action Taken: Other: Cardiology     Travel Screening: Not Applicable     Thank you!  [facility]

## 2023-05-09 NOTE — TELEPHONE ENCOUNTER
[facility] Call Center    Phone Message    May a detailed message be left on voicemail: yes     Reason for Call: Medication Question or concern regarding medication   Prescription Clarification  Name of Medication: long term vs short term antibiotic info needed  Prescribing Provider: Dr Bhatti   Pharmacy: [facility]   What on the order needs clarification? Pharmacy would like to know if patient should discontinue the long term antibiotic while taking the short term (augmentin) for a sinus infection please advise asap          Action Taken: Other: ID    Travel Screening: Not Applicable

## 2023-05-09 NOTE — TELEPHONE ENCOUNTER
Dr. Millard,     Message sent from pharmacist at [facility] where Pt receives his IV infusions asking if Pt is ok to get IV infusions and oral Abx's at the same interval? Or if he should hold anything?     According to chart Pt seen in primary care on 5/8  Assessment / Plan   Productive cough  - amoxicillin-clavulanate potassium (AUGMENTIN) 875-125 mg tablet; Take 1 tablet by mouth 2 times a day for 7 days Dispense: 14 tablet; Refill: 0  - XRAY CHEST PA AND LATERAL; Future       5/3/23 OPAT plan  RECOMMENDATION:  1. Continue daptomycin currently 500mg (rounded from 6mg/kg) IV q48hrs   2. Continue ciprofloxacin 500mg PO q24hrs    Per Up to Date  No interactions of Risk Level A or greater identified.

## 2023-05-11 NOTE — NURSING NOTE
The LVAD driveline site dressing was changed. There was small amount of thick yellow-green drainage. This was cultured per Dr Bhatti's order. She scanned a photo of the site into the chart. The site was cleaned with betadine swabs and dressed with the daily dsng chagne kit. No skin prep was used per Aleah's request. Dr. Bhatti said she would make any needed abx changes based on the culture results. She said they should continue to use the daily kit with betadine instead of chloroprep. He should return to the wound clinic in [facility] for their recommendations now that the wound bed is healed. They should call the VAD coordinator if the drainage increases.

## 2023-05-11 NOTE — NURSING NOTE
Chief Complaint   Patient presents with     RECHECK     Hosp follow up patient requested Dr. Bhatti     Pulse 50   Temp 98  F (36.7  C) (Oral)   Wt 88.1 kg (194 lb 3.2 oz)   SpO2 97%   BMI 30.42 kg/m    Chayito Jensen [facility]

## 2023-05-11 NOTE — PATIENT INSTRUCTIONS
Continue daptomycin and ciprofloxacin for now  We will see what this culture shows prior to changing the antibiotics. The LVAD wound is looking better so we will plan to convert to iodine as part of the dressing changes.

## 2023-05-11 NOTE — PROGRESS NOTES
Infectious Diseases LVAD Clinic Note  05/10/2023    Chief Complaint:  LVAD infection    Recommendations:   1. Continue daptomycin 500 mg IV q 48 hours (~ 5 mg/kg/d) and ciprofloxacin 500 mg daily (crcl 28)  2. Obtained LVAD exit site culture - will re-evaluate PsA susceptibility pattern to determine if ciprofloxacin is the best antibiotic choice for PsA. Also if MRSA is not isolate again (last 12/2022) I would consider transitioning off MRSA coverage and see if we can consolidate PsA and MSSA coverage into 1 antibiotic.  3. Will discuss Eliseo's case at our next LVAD ID group meeting  4. We reviewed the current dressing change technique and the supplies being used (see below) with Ángel (LVAD coordinator). We all agreed to transition back to betadine.  The wound bed adjacent to the LVAD exit site looks good so it does not appear that this needs ongoing attention and/or dressing changes.  Continue daily dressing changes.   5. Will continue weekly CBC with diff, CMP  6. 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 Also recently diagnosed with stage IV cirrhosis via liver biopsy.      LVAD History:  Current LVAD model: History of NICM s/p HM III  Date current LVAD placed: 2/18/20  Previous LVAD devices: none  Other prosthetic devices/materials:  ICD 3/16/20, Mems 8/2022  Primary cardiologist: Dr. Cisneros  Primary ID provider: Magy    History of bacteremias (dates and organisms):   --History of pre-op  Proteus and Enterococcus bacteremia  --MSSA bacteremia secondary to MSSA driveline infection (11/2020).  Remains on cefadroxil or cephalexin for suppression.    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 Increased drainage and fluid collection in 12/2022 managed with I&D and course of daptomycin. s/p I&D of fluid collection on 12/11/22; Cx +MSSA (tetra-R). 12/7 outside culture with both MRSA and MSSA. Attempted to transition to Bactrim suppression in 3/2023 but developed BERNADRA so has remained on Daptomycin since the 12/2022 admission. Culture 3/2/23 with MSSA and Pseudomonas x2 (pan-S) and MSSA x2 strains (both R: tetracycline; 1 with high daptomycin DONTA). Ciprofloxacin was added due to increased drainage. 3/24 cultures with Pseudomonas x3 strains (all FQ resistant). Although cipro resistant pseudomonas he clinically improved so cipro treatment was continued. CT (4/23/23) with soft tissue thickening along driveline but no abscess. Today the exit site is without erythema but he continues to have cream/brown colored drainage at site and on dressing. No soaking through dressing. The wound bed from I&D appears closed without drainage or erythema. Overall it appears better then anticipated. Culture obtain from LVAD site. If Pseudomonas is still isolated and it is cipro resistant I would consider changing to an alternate therapy. Also if MRSA is not isolates could also consider transiting off anti-MRSA therapy.      History of other pertinent infections:   --Hx severe Legionella pneumonia (serogroup 1L) c/b cardiogenic shock, renal failure requiring CRRT, and resp failure requiring intubation, s/p azithromycin    History of driveline area irritation and current mitigation strategies:  local wound care center using vashe nzt 5.5 wound solution - soaking a hydrofera blue sponge and  covering with mepilex. Changing dressing daily.   Current suppressive antibiotics: daptomycin and cipro    Previous antibiotic failures/allergies/intolerances etc: cephalexin, cefadroxil    HPI:     Left hospital on 5/3/23. He has been undergoing wound care at a local wound care center with vashe nzt 5.5 wound solution - soaking a purple sponge and then ringing it out (hydrofera blue). Then places mepilex over the the driveline site. The wound bed adjacent to the LVAD exit site has closed. No areas of erythema, drainage, or open areas. They change the LVAD dressing once per day. There is drainage present on the dressing but it is not soaking through. The color is creamy colored.   It is about the size of dime when its at its worse. There is not redness. Concern for sterility of current dressing change. Interested in switching back to betadine.   PICC line in his right arm - dressing change once a week. Antibiotic once daily. Twice a day ciprofloxacin. No fevers, chills, diarrhea, nausea, or vomiting.  Recently diagnosed with stage 4 cirrhosis on liver biopsy. Next need to see nephrologist and/or hepatologist.    Almost received an antibiotic for possible pneumonia 1-2 days ago but the CXR was clear.   Per wife he has been sleeping more    Patient Active Problem List   Diagnosis     Acute on chronic systolic congestive heart failure (H)     Anxiety     CKD (chronic kidney disease) stage 3, GFR 30-59 ml/min (H)     Coronary artery disease involving native coronary artery of native heart without angina pectoris     GERD (gastroesophageal reflux disease)     Gout     Hyperlipidemia with target LDL less than 100     Nonischemic cardiomyopathy (H)     Non-nephrotic range proteinuria     ABHINAV on CPAP     Type 2 diabetes mellitus with stage 3 chronic kidney disease, without long-term current use of insulin (H)     Heart failure (H)     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)     Cardiac arrest (H)     LVAD  (left ventricular assist device) present (H)     Chronic systolic congestive heart failure (H)     CKD (chronic kidney disease) stage 4, GFR 15-29 ml/min (H)     Bacteremia     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)     Paroxysmal atrial fibrillation (H)     Wound infection     ACC/AHA stage D heart failure (H)     Stage 3b chronic kidney disease (H)     Mixed hyperlipidemia     Benign essential hypertension     Dizziness     Syncope     Tachyarrhythmia     Acute kidney injury (BERNARDA) with acute tubular necrosis (ATN) (H)     Iron deficiency anemia, unspecified iron deficiency anemia type     Hypervolemia, unspecified hypervolemia type     Acute on chronic congestive heart failure, unspecified heart failure type (H)     Hypervolemia       Allergies:  Allergies   Allergen Reactions     Heparin      HIT screen positive 2/14/20, reflex DAVINA negative; however heme recommended treating as if positive  HIT screen negative 2/11/20     Chlorhexidine Rash     Oxycodone Other (See Comments) and Itching       Medications:  Current Outpatient Medications   Medication Sig Dispense Refill     allopurinol (ZYLOPRIM) 100 MG tablet Take 2 tablets (200 mg) by mouth daily 60 tablet 1     amiodarone (PACERONE) 200 MG tablet TAKE 1 TABLET BY MOUTH ONCE DAILY 90 tablet 3     amLODIPine (NORVASC) 5 MG tablet TAKE 2 TABLETS BY MOUTH ONCE DAILY 180 tablet 3     aspirin (ASA) 81 MG EC tablet Take 1 tablet (81 mg) by mouth daily 90 tablet 3     B Complex-C-Folic Acid (RENAL) 1 MG CAPS Take 1 capsule by mouth daily 30 capsule 0     ciprofloxacin (CIPRO) 500 MG tablet Take 1 tablet (500 mg) by mouth daily 30 tablet 0     co-enzyme Q-10 200 MG CAPS Take 200 mg by mouth daily       DAPTOmycin (CUBICIN) 500 MG injection Inject 10 mLs (500 mg) into the vein every 48 hours       empagliflozin (JARDIANCE) 10 MG TABS tablet Take 1 tablet (10 mg) by mouth daily 30 tablet 0     finasteride (PROSCAR) 5 MG tablet  Take 1 tablet (5 mg) by mouth daily Helps with urinary retention. 30 tablet 0     hydrALAZINE (APRESOLINE) 50 MG tablet Take 1 tablet (50 mg) by mouth 3 times daily 90 tablet 0     hydrochlorothiazide (HYDRODIURIL) 50 MG tablet Take 2 tablets (100 mg) by mouth daily 180 tablet 3     isosorbide mononitrate (IMDUR) 120 MG 24 HR ER tablet Take 1 tablet (120 mg) by mouth daily for 90 days 90 tablet 0     isosorbide mononitrate (IMDUR) 30 MG 24 hr tablet Take 4 tablets (120 mg) by mouth daily 360 tablet 3     levothyroxine (SYNTHROID/LEVOTHROID) 100 MCG tablet Take 1 tablet (100 mcg) by mouth every morning (before breakfast) 30 tablet 0     magnesium oxide (MAG-OX) 400 MG tablet Take 400 mg by mouth 2 times daily 30 tablet 1     methocarbamol (ROBAXIN) 500 MG tablet Take 1 tablet (500 mg) by mouth 3 times daily as needed for muscle spasms (Cramps) 90 tablet 1     omeprazole (PRILOSEC) 20 MG DR capsule Take 20 mg by mouth daily       PARoxetine (PAXIL) 10 MG tablet Take 20 mg by mouth daily       potassium chloride ER (KLOR-CON M) 20 MEQ CR tablet Take 4 tablets (80 mEq) by mouth 2 times daily Take an additional 40 mEq on Tuesdays and Saturdays with IV Bumex 774 tablet 3     senna-docusate (SENOKOT-S/PERICOLACE) 8.6-50 MG tablet Take 1 tablet by mouth 2 times daily as needed for constipation       tamsulosin (FLOMAX) 0.4 MG capsule Take 0.8 mg by mouth every evening This med helps with urinary retention 30 capsule 0     torsemide (DEMADEX) 100 MG tablet Take 1 tablet (100 mg) by mouth 3 times daily 270 tablet 3     warfarin ANTICOAGULANT (COUMADIN) 4 MG tablet Take 1 tablet (4 mg) by mouth daily (Patient taking differently: 2 mg on Sundays, 4 mg all other days) 120 tablet 3     zolpidem (AMBIEN) 5 MG tablet Take 5 mg by mouth nightly as needed for Insomnia         Exam:  Pulse 50   Temp 98  F (36.7  C) (Oral)   Wt 88.1 kg (194 lb 3.2 oz)   SpO2 97%   BMI 30.42 kg/m    Gen: Alert and in no distress. Wife present.    Psych: Normal affect. Alert and oriented.   Eyes: sclera w/o jaundice  CV: LVAD hum present  Abdomen: Soft, non-distended.  Extremities: Warm and well perfused.     Driveline exit site:  The wound bed adjacent to LVAD exit site is scared. No open areas. Drainage coming from driveline site. Cream/brown colored at the exit site and on the dressing. See picture below.         Labs:  WBC   Date Value Ref Range Status   03/19/2021 7.9 10^9/L Final     WBC Count   Date Value Ref Range Status   05/03/2023 6.7 4.0 - 11.0 10e3/uL Final       CRP Inflammation   Date Value Ref Range Status   08/17/2021 4.9 0.0 - 8.0 mg/L Final   02/16/2021 270.0 (H) 0.0 - 8.0 mg/L Final   02/15/2021 270.0 (H) 0.0 - 8.0 mg/L Final   02/11/2021 8.6 0.0 - 10.0 mg/L Final       Creatinine   Date Value Ref Range Status   05/03/2023 2.59 (H) 0.67 - 1.17 mg/dL Final   05/02/2023 2.66 (H) 0.67 - 1.17 mg/dL Final   05/01/2023 2.76 (H) 0.67 - 1.17 mg/dL Final   04/09/2021 1.6 mg/dL Final   03/19/2021 2.1 mg/dL Final   03/17/2021 2.00 (H) 0.66 - 1.25 mg/dL Final       Culture Micro   Date Value Ref Range Status   02/17/2021 No growth  Final   02/17/2021 No growth  Final   02/16/2021 Light growth  Enterococcus faecium   (A)  Final   02/16/2021 (A)  Final    Legionella pneumophila  isolated  Sent to [facility] for serotyping     02/16/2021   Final    Critical Value/Significant Value, preliminary result only, called to and read back by  Shobha Pedro RN on 2.23.21 at 1401. bw     02/16/2021 (A)  Final    Report received from the [place] [facility]:  Legionella pneumophila serogroup 1  This test was developed and its performance characteristics determined by the [facility].  It has not been cleared or approved by the U.S. Food and Drug   Administration:21CFR 809.30(e).  The FDA has determined that such clearance is not   necessary.         Culture   Date Value Ref Range Status   03/24/2023 3+ Pseudomonas aeruginosa (A)  Final    03/24/2023 3+ Pseudomonas aeruginosa (A)  Final   03/24/2023 3+ Pseudomonas aeruginosa (A)  Final   03/24/2023 1+ Staphylococcus warneri (A)  Final     Comment:     Susceptibilities not routinely done, refer to antibiogram to view typical susceptibility profiles   03/02/2023 1+ Pseudomonas aeruginosa (A)  Final   03/02/2023 1+ Pseudomonas aeruginosa (A)  Final   03/02/2023 1+ Staphylococcus aureus (A)  Corrected     Comment:     Susceptibility testing requested by Sunni Jimenez pharmacist ([id number]) to check slightly elevated vanco result. 3.8.23 at 1047  Susceptibility testing requested by Sunni Jimenez, pharmacist for dalbavancin.    03/02/2023 1+ Staphylococcus aureus (A)  Final   03/02/2023 No anaerobic organisms isolated  Final       Imaging:  Cardiac Device Check - Inpatient  Patient seen in [address] [place] [address] for evaluation and iterative programming of their   ICD per MD orders.    Device: Medtronic [id number] Visia AF MRI VR  Normal Device Function.   Mode: VVIR  bpm  : 0%  Intrinsic rhythm: VS @ 100 bpm  Short V-V intervals: 0  Thoracic Impedance: Near reference line.   Lead Trends Appear Stable: Yes  Estimated battery longevity to RRT = 8.5 years. Battery voltage = 2.99 V.  Atrial arrhythmia: No episodes recorded. Patient has single ventricular   lead. Per trends, patient had a sudden decrease in  around the beginning   of April, along with a sudden increase in baseline Day/Night HRs. It is   difficult to discern the cause of this change. Per chart review, patient   has known history of pAF. R-R intervals appear regular during   interrogation today.   AF burden: Device records 0%  Anticoagulant: Warfarin  Ventricular Arrhythmia: None  Setting changes: None    Plan: Continue inpatient evaluation and treatment.  JHONNY Yanez RN    Single lead ICD    I have reviewed and interpreted the device interrogation, settings,   programming and nurse's summary. The device is functioning within normal   device parameters. I agree  with the current findings, assessment and plan.

## 2023-05-11 NOTE — TELEPHONE ENCOUNTER
ANTICOAGULATION     Eliseo Tanner is overdue for INR check.      Patient went in for labs yesterday and an INR was not drawn. Spoke with Jed and she will have Bon go in next week some time for a recheck.     Gloria Abbasi RN

## 2023-05-11 NOTE — LETTER
5/11/2023       RE: Eliseo Tanner  [address] [facility] [address]     Dear Colleague,    Thank you for referring your patient, Eliseo Tanner, to the [facility] INFECTIOUS DISEASE CLINIC [place] at [facility]. Please see a copy of my visit note below.    Infectious Diseases LVAD Clinic Note  05/10/2023    Chief Complaint:  LVAD infection    Recommendations:   Continue daptomycin 500 mg IV q 48 hours (~ 5 mg/kg/d) and ciprofloxacin 500 mg daily (crcl 28)  Obtained LVAD exit site culture - will re-evaluate PsA susceptibility pattern to determine if ciprofloxacin is the best antibiotic choice for PsA. Also if MRSA is not isolate again (last 12/2022) I would consider transitioning off MRSA coverage and see if we can consolidate PsA and MSSA coverage into 1 antibiotic.  Will discuss Eliseo's case at our next LVAD ID group meeting  We reviewed the current dressing change technique and the supplies being used (see below) with Ángel (LVAD coordinator). We all agreed to transition back to betadine.  The wound bed adjacent to the LVAD exit site looks good so it does not appear that this needs ongoing attention and/or dressing changes.  Continue daily dressing changes.   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 Also recently diagnosed with stage IV cirrhosis via liver biopsy.      LVAD History:  Current LVAD  model: History of NICM s/p HM III  Date current LVAD placed: 2/18/20  Previous LVAD devices: none  Other prosthetic devices/materials:  ICD 3/16/20, Mems 8/2022  Primary cardiologist: Dr. Cisneros  Primary ID provider: Magy    History of bacteremias (dates and organisms):   --History of pre-op Proteus and Enterococcus bacteremia  --MSSA bacteremia secondary to MSSA driveline infection (11/2020).  Remains on cefadroxil or cephalexin for suppression.    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 Increased drainage and fluid collection in 12/2022 managed with I&D and course of daptomycin. s/p I&D of fluid collection on 12/11/22; Cx +MSSA (tetra-R). 12/7 outside culture with both MRSA and MSSA. Attempted to transition to Bactrim suppression in 3/2023 but developed BERNARDA so has remained on Daptomycin since the 12/2022 admission. Culture 3/2/23 with MSSA and Pseudomonas x2 (pan-S) and MSSA x2 strains (both R: tetracycline; 1 with high daptomycin DONTA). Ciprofloxacin was added due to increased drainage. 3/24 cultures with Pseudomonas x3 strains (all FQ resistant). Although cipro resistant pseudomonas he clinically improved so cipro treatment was continued. CT (4/23/23) with soft tissue thickening along driveline but no abscess. Today the exit site is without erythema but he continues to have cream/brown colored drainage at site and on dressing. No soaking through dressing. The wound bed from I&D appears closed without drainage or erythema. Overall it appears better then anticipated. Culture obtain from LVAD site. If Pseudomonas is still isolated and it is cipro resistant I would consider changing to an alternate therapy. Also if MRSA is not isolates could also consider transiting off anti-MRSA therapy.      History of other pertinent infections:   --Hx severe Legionella pneumonia  (serogroup 1L) c/b cardiogenic shock, renal failure requiring CRRT, and resp failure requiring intubation, s/p azithromycin    History of driveline area irritation and current mitigation strategies:  local wound care center using vashe nzt 5.5 wound solution - soaking a hydrofera blue sponge and covering with mepilex. Changing dressing daily.   Current suppressive antibiotics: daptomycin and cipro    Previous antibiotic failures/allergies/intolerances etc: cephalexin, cefadroxil    HPI:     Left hospital on 5/3/23. He has been undergoing wound care at a local wound care center with vashe nzt 5.5 wound solution - soaking a purple sponge and then ringing it out (hydrofera blue). Then places mepilex over the the driveline site. The wound bed adjacent to the LVAD exit site has closed. No areas of erythema, drainage, or open areas. They change the LVAD dressing once per day. There is drainage present on the dressing but it is not soaking through. The color is creamy colored.   It is about the size of dime when its at its worse. There is not redness. Concern for sterility of current dressing change. Interested in switching back to betadine.   PICC line in his right arm - dressing change once a week. Antibiotic once daily. Twice a day ciprofloxacin. No fevers, chills, diarrhea, nausea, or vomiting.  Recently diagnosed with stage 4 cirrhosis on liver biopsy. Next need to see nephrologist and/or hepatologist.    Almost received an antibiotic for possible pneumonia 1-2 days ago but the CXR was clear.   Per wife he has been sleeping more    Patient Active Problem List   Diagnosis    Acute on chronic systolic congestive heart failure (H)    Anxiety    CKD (chronic kidney disease) stage 3, GFR 30-59 ml/min (H)    Coronary artery disease involving native coronary artery of native heart without angina pectoris    GERD (gastroesophageal reflux disease)    Gout    Hyperlipidemia with target LDL less than 100    Nonischemic  cardiomyopathy (H)    Non-nephrotic range proteinuria    ABHINAV on CPAP    Type 2 diabetes mellitus with stage 3 chronic kidney disease, without long-term current use of insulin (H)    Heart failure (H)    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)    Cardiac arrest (H)    LVAD (left ventricular assist device) present (H)    Chronic systolic congestive heart failure (H)    CKD (chronic kidney disease) stage 4, GFR 15-29 ml/min (H)    Bacteremia    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)    Paroxysmal atrial fibrillation (H)    Wound infection    ACC/AHA stage D heart failure (H)    Stage 3b chronic kidney disease (H)    Mixed hyperlipidemia    Benign essential hypertension    Dizziness    Syncope    Tachyarrhythmia    Acute kidney injury (BERNARDA) with acute tubular necrosis (ATN) (H)    Iron deficiency anemia, unspecified iron deficiency anemia type    Hypervolemia, unspecified hypervolemia type    Acute on chronic congestive heart failure, unspecified heart failure type (H)    Hypervolemia       Allergies:  Allergies   Allergen Reactions    Heparin      HIT screen positive 2/14/20, reflex DAVINA negative; however heme recommended treating as if positive  HIT screen negative 2/11/20    Chlorhexidine Rash    Oxycodone Other (See Comments) and Itching       Medications:  Current Outpatient Medications   Medication Sig Dispense Refill    allopurinol (ZYLOPRIM) 100 MG tablet Take 2 tablets (200 mg) by mouth daily 60 tablet 1    amiodarone (PACERONE) 200 MG tablet TAKE 1 TABLET BY MOUTH ONCE DAILY 90 tablet 3    amLODIPine (NORVASC) 5 MG tablet TAKE 2 TABLETS BY MOUTH ONCE DAILY 180 tablet 3    aspirin (ASA) 81 MG EC tablet Take 1 tablet (81 mg) by mouth daily 90 tablet 3    B Complex-C-Folic Acid (RENAL) 1 MG CAPS Take 1 capsule by mouth daily 30 capsule 0    ciprofloxacin (CIPRO) 500 MG tablet Take 1 tablet (500 mg) by mouth daily 30 tablet 0    co-enzyme Q-10 200 MG CAPS  Take 200 mg by mouth daily      DAPTOmycin (CUBICIN) 500 MG injection Inject 10 mLs (500 mg) into the vein every 48 hours      empagliflozin (JARDIANCE) 10 MG TABS tablet Take 1 tablet (10 mg) by mouth daily 30 tablet 0    finasteride (PROSCAR) 5 MG tablet Take 1 tablet (5 mg) by mouth daily Helps with urinary retention. 30 tablet 0    hydrALAZINE (APRESOLINE) 50 MG tablet Take 1 tablet (50 mg) by mouth 3 times daily 90 tablet 0    hydrochlorothiazide (HYDRODIURIL) 50 MG tablet Take 2 tablets (100 mg) by mouth daily 180 tablet 3    isosorbide mononitrate (IMDUR) 120 MG 24 HR ER tablet Take 1 tablet (120 mg) by mouth daily for 90 days 90 tablet 0    isosorbide mononitrate (IMDUR) 30 MG 24 hr tablet Take 4 tablets (120 mg) by mouth daily 360 tablet 3    levothyroxine (SYNTHROID/LEVOTHROID) 100 MCG tablet Take 1 tablet (100 mcg) by mouth every morning (before breakfast) 30 tablet 0    magnesium oxide (MAG-OX) 400 MG tablet Take 400 mg by mouth 2 times daily 30 tablet 1    methocarbamol (ROBAXIN) 500 MG tablet Take 1 tablet (500 mg) by mouth 3 times daily as needed for muscle spasms (Cramps) 90 tablet 1    omeprazole (PRILOSEC) 20 MG DR capsule Take 20 mg by mouth daily      PARoxetine (PAXIL) 10 MG tablet Take 20 mg by mouth daily      potassium chloride ER (KLOR-CON M) 20 MEQ CR tablet Take 4 tablets (80 mEq) by mouth 2 times daily Take an additional 40 mEq on Tuesdays and Saturdays with IV Bumex 774 tablet 3    senna-docusate (SENOKOT-S/PERICOLACE) 8.6-50 MG tablet Take 1 tablet by mouth 2 times daily as needed for constipation      tamsulosin (FLOMAX) 0.4 MG capsule Take 0.8 mg by mouth every evening This med helps with urinary retention 30 capsule 0    torsemide (DEMADEX) 100 MG tablet Take 1 tablet (100 mg) by mouth 3 times daily 270 tablet 3    warfarin ANTICOAGULANT (COUMADIN) 4 MG tablet Take 1 tablet (4 mg) by mouth daily (Patient taking differently: 2 mg on Sundays, 4 mg all other days) 120 tablet 3     zolpidem (AMBIEN) 5 MG tablet Take 5 mg by mouth nightly as needed for Insomnia         Exam:  Pulse 50   Temp 98  F (36.7  C) (Oral)   Wt 88.1 kg (194 lb 3.2 oz)   SpO2 97%   BMI 30.42 kg/m    Gen: Alert and in no distress. Wife present.   Psych: Normal affect. Alert and oriented.   Eyes: sclera w/o jaundice  CV: LVAD hum present  Abdomen: Soft, non-distended.  Extremities: Warm and well perfused.     Driveline exit site:  The wound bed adjacent to LVAD exit site is scared. No open areas. Drainage coming from driveline site. Cream/brown colored at the exit site and on the dressing. See picture below.         Labs:  WBC   Date Value Ref Range Status   03/19/2021 7.9 10^9/L Final     WBC Count   Date Value Ref Range Status   05/03/2023 6.7 4.0 - 11.0 10e3/uL Final       CRP Inflammation   Date Value Ref Range Status   08/17/2021 4.9 0.0 - 8.0 mg/L Final   02/16/2021 270.0 (H) 0.0 - 8.0 mg/L Final   02/15/2021 270.0 (H) 0.0 - 8.0 mg/L Final   02/11/2021 8.6 0.0 - 10.0 mg/L Final       Creatinine   Date Value Ref Range Status   05/03/2023 2.59 (H) 0.67 - 1.17 mg/dL Final   05/02/2023 2.66 (H) 0.67 - 1.17 mg/dL Final   05/01/2023 2.76 (H) 0.67 - 1.17 mg/dL Final   04/09/2021 1.6 mg/dL Final   03/19/2021 2.1 mg/dL Final   03/17/2021 2.00 (H) 0.66 - 1.25 mg/dL Final       Culture Micro   Date Value Ref Range Status   02/17/2021 No growth  Final   02/17/2021 No growth  Final   02/16/2021 Light growth  Enterococcus faecium   (A)  Final   02/16/2021 (A)  Final    Legionella pneumophila  isolated  Sent to [facility] for serotyping     02/16/2021   Final    Critical Value/Significant Value, preliminary result only, called to and read back by  Shobha Pedro RN on 2.23.21 at 1401. bw     02/16/2021 (A)  Final    Report received from the [place] Dept. of Health:  Legionella pneumophila serogroup 1  This test was developed and its performance characteristics determined by the [facility].  It has not been cleared  or approved by the U.S. Food and Drug   Administration:21CFR 809.30(e).  The FDA has determined that such clearance is not   necessary.         Culture   Date Value Ref Range Status   03/24/2023 3+ Pseudomonas aeruginosa (A)  Final   03/24/2023 3+ Pseudomonas aeruginosa (A)  Final   03/24/2023 3+ Pseudomonas aeruginosa (A)  Final   03/24/2023 1+ Staphylococcus warneri (A)  Final     Comment:     Susceptibilities not routinely done, refer to antibiogram to view typical susceptibility profiles   03/02/2023 1+ Pseudomonas aeruginosa (A)  Final   03/02/2023 1+ Pseudomonas aeruginosa (A)  Final   03/02/2023 1+ Staphylococcus aureus (A)  Corrected     Comment:     Susceptibility testing requested by Sunni Jimenez pharmacist ([id number]) to check slightly elevated vanco result. 3.8.23 at 1047  Susceptibility testing requested by Sunni Jimenez, pharmacist for [facility].    03/02/2023 1+ Staphylococcus aureus (A)  Final   03/02/2023 No anaerobic organisms isolated  Final       Imaging:  Cardiac Device Check - Inpatient  Patient seen in [address] [place] [address] for evaluation and iterative programming of their   ICD per MD orders.    Device: Medtronic [id number] Visia AF MRI VR  Normal Device Function.   Mode: VVIR  bpm  : 0%  Intrinsic rhythm: VS @ 100 bpm  Short V-V intervals: 0  Thoracic Impedance: Near reference line.   Lead Trends Appear Stable: Yes  Estimated battery longevity to RRT = 8.5 years. Battery voltage = 2.99 V.  Atrial arrhythmia: No episodes recorded. Patient has single ventricular   lead. Per trends, patient had a sudden decrease in  around the beginning   of April, along with a sudden increase in baseline Day/Night HRs. It is   difficult to discern the cause of this change. Per chart review, patient   has known history of pAF. R-R intervals appear regular during   interrogation today.   AF burden: Device records 0%  Anticoagulant: Warfarin  Ventricular Arrhythmia: None  Setting changes: None    Plan: Continue inpatient evaluation  and treatment.  JHONNY Yanez RN    Single lead ICD    I have reviewed and interpreted the device interrogation, settings,   programming and nurse's summary. The device is functioning within normal   device parameters. I agree with the current findings, assessment and plan.   Negar Bhatti, DO

## 2023-05-12 NOTE — PROGRESS NOTES
[facility] CORE Clinic - CardioMEMS Reading Review    May 12, 2023, 0736   CardioMems period: 4/20/2023 - 5/19/23      CardioMEMS reviewed and routed to patient's provider, Laine BARRY NP.     Current diuretic:  Torsemide 100 TID, Hydrochlorothiazide 100mg daily, Bumex 5mg IV Monday and Friday  Current potassium chloride dose:  Potassium 80mEq BID w/ extra 40mEq on Mondays and Fridays    Symptoms: No changes  Weights: Today, going back: 182, 181, 183    Changes to plan of care today: None    Current Threshold parameters: 21 - 24    Today's Waveform:     Readings:         RHC Date Wedge Pressure MEMS PAD during cath  (average of the 3 values)     Sept 20, 2022 w/MEMS implant     15 mmHg   16 mmHg

## 2023-05-12 NOTE — PROGRESS NOTES
Informed Eliseo that Furocix was approved with a monthly copay over $1600.  Pt reports he cannot afford this medication.  Dr. Blayne mcdonald.

## 2023-05-17 NOTE — PROGRESS NOTES
[facility] CORE Clinic - CardioMEMS Reading Review    October 20, 2022   Cardiomems period: 4/18/23 - 5/17/23    CardioMEMS reviewed and routed to patient's provider, Laine BARRY NP.     Current diuretic:  Torsemide 100 TID, Hydrochlorothiazide 100mg daily, Bumex 5mg IV Monday and Friday  Current potassium chloride dose:  Potassium 80mEq BID w/ extra 40mEq on Mondays and Friday's    Symptoms: Ongoing fatigue   Weights: 181 - 187    Recent plan of care changes: Pt hospitalized for volume overload. Hydrochlorothiazide increased to daily with an increase in Potassium. Stopped Spironolactone for elevated creatinine.     Current Threshold parameters: 21 - 24    Today's Waveform:       Reading Ranges:       RHC Date Wedge Pressure MEMS PAD during cath  (average of the 3 values)     Sept 20, 2022 w/MEMS implant     15 mmHg   16 mmHg

## 2023-05-17 NOTE — PROGRESS NOTES
5/16/23  9:37 PM  Hi Bon,      The exit site culture from Thursday did grow Pseudomonas that is resistant to the ciprofloxacin. I will talk with the LVAD and ID clinic teams about changing your antibiotics. I will plan to stop the daptomycin and ciprofloxacin. In their replacement I will start cefepime 2 grams IV every 24 hours. We are going to drop the coverage for the MRSA for now and see if it grows again in the future. We will continue to check weekly labs.      Please let me know if you have any questions.     Thank you,      Negar Bhatti  Infectious Diseases                  ANTICOAGULATION MANAGEMENT     Eliseo Tanner 67 year old male is on warfarin with therapeutic INR result. (Goal INR 1.7-2.3)    Recent labs: (last 7 days)     05/16/23  0959   INR 1.7*       ASSESSMENT       Source(s): Chart Review and Patient/Caregiver Call       Warfarin doses taken: Warfarin taken as instructed    Diet: No new diet changes identified    Medication/supplement changes:5/17/23 I will plan to stop the daptomycin and ciprofloxacin. In their replacement I will start cefepime 2 grams IV every 24 hours.     New illness, injury, or hospitalization: Yes: Recent fall, did not hit his head    Signs or symptoms of bleeding or clotting: Yes: 2 skin tears from fall. Chapis is changing a dressing 2x/day, will not stop bleedng.    Previous result: Therapeutic last 2(+) visits    Additional findings: None         PLAN     Recommended plan for no diet, medication or health factor changes affecting INR     Dosing Instructions: Continue your current warfarin dose with next INR in 1 week       Summary  As of 5/17/2023    Full warfarin instructions:  2 mg every Wed; 4 mg all other days   Next INR check:  5/23/2023             Telephone call with  Chapis who verbalizes understanding and agrees to plan and who agrees to plan and repeated back plan correctly    Patient using outside facility for labs    Education provided:     Symptom  monitoring: monitoring for bleeding signs and symptoms, monitoring for clotting signs and symptoms, monitoring for stroke signs and symptoms, when to seek medical attention/emergency care and if you hit your head or have a bad fall seek emergency care    Importance of notifying anticoagulation clinic for: changes in medications; a sooner lab recheck maybe needed, diarrhea, nausea/vomiting, reduced intake, cold/flu, and/or infections; a sooner lab recheck maybe needed and if you did not receive dosing instructions on the same day as your labs were checked    Plan made with [facility] Pharmacist Tamiko Rosas and per LVAD protocol    Young Bustos, RN  Anticoagulation Clinic  5/17/2023    _______________________________________________________________________     Anticoagulation Episode Summary     Current INR goal:  1.7-2.3   TTR:  47.1 % (9.9 mo)   Target end date:  Indefinite   Send INR reminders to:  ANTICOAG LVAD    Indications    LVAD (left ventricular assist device) present (H) [Z95.811]  Chronic systolic congestive heart failure (H) [I50.22]  Paroxysmal atrial fibrillation (H) [I48.0]           Comments:  Follow VAD Anticoag protocol:Yes: HeartMate 3   Bridging: Call MD each time   Date VAD placed: 2/18/2020 5/6/22 [employer]         Anticoagulation Care Providers     Provider Role Specialty Phone number    Nader Cisneros MD Referring Cardiovascular Disease [phone]

## 2023-05-17 NOTE — PROGRESS NOTES
5/11/23 driveline exit site grew 2 strains of PsA that are resistance to fluoroquinolones.   Recent cultures have not grown MRSA. Will change ciprofloxacin to cefepime. The cefepime will provide coverage for MSSA and PsA. Will plan to stop daptomycin and determine in the future if MRSA grows again. The renal dose of cefepime is 2 grams IV q 24 hours  (crcl 28).  Will also continue to check weekly CBC with diff and CMP.    Negar Bhatti  Infectious Diseases

## 2023-05-19 PROBLEM — D64.9 ANEMIA, UNSPECIFIED TYPE: Status: ACTIVE | Noted: 2023-01-01

## 2023-05-19 PROBLEM — Z95.811 LVAD (LEFT VENTRICULAR ASSIST DEVICE) PRESENT (H): Status: ACTIVE | Noted: 2020-03-16

## 2023-05-19 NOTE — PROGRESS NOTES
Received message from ID clinic requesting review of abnormal lab results. Abnormals not felt to be related to antibiotics or infection. Na 126, k 3.4, creat 2.5.   Called pt to check in. Pt reports he is not feeling well. He is weak, tired, dizzy with standing. Weight is stable at 182-183lb. Last cardiomems reading was yesterday and PAD was 20 (threshold 21-24). Then spoke with pt's wife who reported pt has been falling at least once per day for the last several days, and she is concerned about him. Reviewed situation with Laine Leon NP, who instructed that pt needs to be assessed in the ED. Relayed instructions to pt's wife, who stated she will bring him in.   Called [facility] ED; spoke with charge RN and relayed reason for sending pt in. Also found that pt's hgb was 8.8 from 10.2. Provided phone number for patient placement in the event that pt needs transfer or local provider needs guidance from [facility] cardiology team.   Updated primary VC, cardiologist, and Cards 2 attending.

## 2023-05-20 NOTE — CONSULTS
Please refer to fulfilled GI note from AM for recommendations on this consult. Incorrect selection of occupational therapy in consult orders.     Mary Carmen Velasquez MD  GI Fellow

## 2023-05-20 NOTE — ED NOTES
Provider updated: MD Danyel- Res      ED RM 27    pt c/o heartburn, req something to take for it. VSS WNL, blood currently running.    Thanks, Elizabeth [phone]

## 2023-05-20 NOTE — ED PROVIDER NOTES
[place] EMERGENCY DEPARTMENT ([facility])    5/19/23       ED PROVIDER NOTE      History     Chief Complaint   Patient presents with     Generalized Weakness     Fall     The history is provided by the patient and medical records.     Eliseo Tanner is a 67 year old male with a past medical history significant for CHF s/p LVAD in place (02/2020), cardiac arrest, CAD, nonischemic cardiomyopathy, paroxysmal atrial fibrillation (on anticoagulation), stage IV CKD, BERNARDA, DM2 without long-term use of insulin, HTN, and HLD who presents to the ED as a transfer from outside hospital for admission to cardiology.  Patient initially presented to [facility] earlier today with dizziness, generalized weakness, and recurrent falls.  It was noted that he had a low hemoglobin.  He endorses increased dizziness, lightheadedness, and fatigue when upright and ambulating, resulting in frequent falling.  Patient states that he has been experiencing the symptoms for the past couple of weeks.  He denies chest pain or pain elsewhere.  No recent bleeding or bloody stools.  Patient reports that his LVAD numbers have been typical prior to his presentation to the ED.  Currently, LVAD numbers are as follows: pump speed 5850, flow 4.6, PI 4.0, and power 4.6.  He does state that power and flow numbers appear lower than typical.    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 [id number] and ;  Surgeon: Dalton Baeza MD;  Location:  HEART CARDIAC CATH LAB     CV CENTRAL VENOUS CATHETER PLACEMENT N/A 02/13/2020    Procedure: Central Venous Catheter Placement;  Surgeon: Chente Moss MD;  Location:  HEART CARDIAC CATH LAB     CV INTRA AORTIC BALLOON N/A 02/07/2020    Procedure: Intra-Aortic Balloon Pump Insertion;  Surgeon: Jose Baldwin MD;  Location:  HEART CARDIAC CATH LAB     CV INTRA AORTIC BALLOON N/A 02/13/2020    Procedure: Intra-Aortic Balloon Pump Insertion;  Surgeon: Chente Moss MD;  Location:  HEART CARDIAC CATH LAB     CV RIGHT HEART CATH MEASUREMENTS RECORDED N/A 09/21/2020    Procedure: CV RIGHT HEART CATH;  Surgeon: Dalton Baeza MD;  Location:  HEART CARDIAC CATH LAB     CV RIGHT HEART CATH MEASUREMENTS RECORDED N/A 01/07/2021    Procedure: Right Heart Cath;  Surgeon: Chun Ball MD;  Location:  HEART CARDIAC CATH LAB     CV RIGHT HEART CATH MEASUREMENTS RECORDED N/A 02/10/2022    Procedure: CV RIGHT HEART CATH;  Surgeon: Dalton Baeza MD;  Location:  HEART CARDIAC CATH LAB     CV RIGHT HEART CATH MEASUREMENTS RECORDED N/A 09/20/2022    Procedure: Right Heart Catheterization [[id number]];  Surgeon: Dalton Baeza MD;  Location:  HEART CARDIAC CATH LAB     CV RIGHT HEART CATH MEASUREMENTS RECORDED N/A 12/12/2022    Procedure: Right Heart Cath;  Surgeon: Chente Moss MD;  Location:  HEART CARDIAC CATH LAB     CV RIGHT HEART CATH MEASUREMENTS RECORDED N/A 4/28/2023    Procedure: Right Heart Catheterization;  Surgeon: Aaliyah Fischer MD;  Location:  HEART CARDIAC CATH LAB     CV SWAN LUCIANA PROCEDURE N/A 02/13/2020    Procedure: Tobias Luciana Procedure;  Surgeon: Chente Moss MD;   [facility]: [facility] HEART CARDIAC CATH LAB     EP ICD Bilateral 03/16/2020    Procedure: EP ICD;  Surgeon: Dali Day MD;  [facility] CARD[facility]AC [facility]     INCISION AND DRAINAGE CHEST WASHOUT, COMBINED N/A 12/11/2022    Procedure: INCISION AND DRAINAGE OF DRIVELINE;  Surgeon: Mac Jaramillo MD;  Location: [facility] OR     INSERT VENTRICULAR ASSIST DEVICE LEFT (HEARTMATE II) N/A 02/18/2020    Procedure: INSERTION, LEFT VENTRICULAR ASSIST DEVICE (HEARTMATE III);  Surgeon: Mac Jaramillo MD;  Location: [facility] OR     IR CVC TUNNEL PLACEMENT > 5 YRS OF AGE  02/23/2021     IR CVC TUNNEL REMOVAL RIGHT  03/16/2021     IR TRANSCATHETER BIOPSY  5/2/2023     MIDLINE INSERTION - DOUBLE LUMEN Left 12/15/2022    left basilic 5 fr dl midline 20 cm     THORACOSCOPY Right 03/06/2020    Procedure: RIGHT VIDEO-ASSISTED THORASCOPIC SURGERY, EVACUATION OF HEMOTHORAX, PLACEMENT OF CHEST TUBES;  Surgeon: William Gan MD;  Location: [facility] OR     allopurinol (ZYLOPRIM) 100 MG tablet  amiodarone (PACERONE) 200 MG tablet  amLODIPine (NORVASC) 5 MG tablet  aspirin (ASA) 81 MG EC tablet  B Complex-C-Folic Acid (RENAL) 1 MG CAPS  co-enzyme Q-10 200 MG CAPS  empagliflozin (JARDIANCE) 10 MG TABS tablet  finasteride (PROSCAR) 5 MG tablet  hydrALAZINE (APRESOLINE) 50 MG tablet  hydrochlorothiazide (HYDRODIURIL) 50 MG tablet  isosorbide mononitrate (IMDUR) 120 MG 24 HR ER tablet  isosorbide mononitrate (IMDUR) 30 MG 24 hr tablet  levothyroxine (SYNTHROID/LEVOTHROID) 100 MCG tablet  magnesium oxide (MAG-OX) 400 MG tablet  methocarbamol (ROBAXIN) 500 MG tablet  omeprazole (PRILOSEC) 20 MG DR capsule  PARoxetine (PAXIL) 10 MG tablet  potassium chloride ER (KLOR-CON M) 20 MEQ CR tablet  senna-docusate (SENOKOT-S/PERICOLACE) 8.6-50 MG tablet  tamsulosin (FLOMAX) 0.4 MG capsule  torsemide (DEMADEX) 100 MG tablet  warfarin ANTICOAGULANT (COUMADIN) 4 MG tablet  zolpidem (AMBIEN) 5 MG tablet      Allergies   Allergen Reactions     Heparin      HIT  screen positive 20, reflex DAVINA negative; however heme recommended treating as if positive  HIT screen negative 20     Chlorhexidine Rash     Oxycodone Other (See Comments) and Itching     Family History  No family history on file.  Social History   Social History     Tobacco Use     Smoking status: Former     Types: Cigarettes     Quit date: 1994     Years since quittin.1     Smokeless tobacco: Never   Vaping Use     Vaping status: Never Used   Substance Use Topics     Alcohol use: Not Currently     Drug use: Never         A medically appropriate review of systems was performed with pertinent positives and negatives noted in the HPI, and all other systems negative.    Physical Exam      Physical Exam  Vitals and nursing note reviewed.   Constitutional:       General: He is not in acute distress.     Appearance: Normal appearance.   HENT:      Head: Normocephalic.      Nose: Nose normal.   Eyes:      Pupils: Pupils are equal, round, and reactive to light.   Cardiovascular:      Rate and Rhythm: Normal rate and regular rhythm.   Pulmonary:      Effort: Pulmonary effort is normal.   Abdominal:      General: There is no distension.   Musculoskeletal:         General: No deformity. Normal range of motion.      Cervical back: Normal range of motion.   Skin:     General: Skin is warm.          Neurological:      Mental Status: He is alert and oriented to person, place, and time.   Psychiatric:         Mood and Affect: Mood normal.           ED Course, Procedures, & Data     12:10 AM  The patient was seen and examined by Neftaly Bella   in Room ED27.          No results found for any visits on 23.  Medications - No data to display  Labs Ordered and Resulted from Time of ED Arrival to Time of ED Departure - No data to display  No orders to display          Critical care was not performed.     Medical Decision Making  The patient's presentation was of high complexity (an acute health issue posing  potential threat to life or bodily function).    The patient's evaluation involved:  review of external note(s) from 1 sources (ED note from [facility])  review of 3+ test result(s) ordered prior to this encounter (cbc, cmp, coags)  discussion of management or test interpretation with another health professional (ED provider, Cardiology staff)    The patient's management necessitated high risk (a decision regarding hospitalization).      Assessment & Plan    Patient with history of CHF status post LVAD, presents to the ED as a transfer from [facility] with plan for admission to the cardiology service.  Patient has been experiencing generalized lightheadedness/weakness for the past 2 weeks.  Prior to transfer to the [facility], patient was evaluated in outside ED and found to have a hemoglobin of 7.2 which is an acute drop from his baseline of 9.  No obvious source of bleeding on exam.  No significant traumatic injury/hematomas to suggest major blood loss.  His LVAD numbers are stable.  He does have multiple skin tears throughout his extremities in various stages of healing.  Nothing in need of acute repair.  Case discussed with the cardiology team.  They will admit to their service.  Hemolytic labs have been ordered and are pending to be followed by admitting team.  Signout given to the in-house cardiology team as well.    I have reviewed the nursing notes. I have reviewed the findings, diagnosis, plan and need for follow up with the patient.    New Prescriptions    No medications on file       Final diagnoses:   LVAD (left ventricular assist device) present (H)   Anemia, unspecified type   I, Sydni Alvarez, am serving as a trained medical scribe to document services personally performed by Neftaly Bella DO, based on the provider's statements to me.      I, Neftaly Bella DO, was physically present and have reviewed and verified the accuracy of this note documented by Sydni Alvarez.      DO JIM Sheehan  [facility] EMERGENCY DEPARTMENT  5/19/2023     Neftaly Bella DO  05/20/23 0015

## 2023-05-20 NOTE — PROGRESS NOTES
CLINICAL NUTRITION SERVICES    Reason for Assessment:  ED consult:  Congestive Heart Failure (CHF) - Dietitian to instruct patient on 2 gram sodium diet     Diet History:  Pt busy with test in the room. Wife at bedside with daughter. Per spouse, patient keep a close watch on sodium and fluid intake at home. They avoid processed foods and read label at all time    Nutrition Prescription:  Started on clear liquid diet     Interventions:  Nutrition Education  1. Provided quick review on low-sodium meal planning.  2. Provided the following handouts: How to Read Nutrition Labels, Low-Sodium Foods and Drinks, Tips for a Low-Sodium Diet, Seasoning Your Foods Without Adding Salt, and Managing Fluid Restriction.      Goals:    Patient to continue to watch sodium intake    Follow-up:   Patient to ask any further nutrition-related questions before discharge. In addition, pt may request outpatient RD appointment.      David Acevedo RD/EDWARDO  6D/ED RD pager: [phone]  Weekend/Holiday RD pager: [phone]

## 2023-05-20 NOTE — PROCEDURES
The patient's HeartMate LVAD was interrogated 5/20/2023  * Speed 5800 rpm   * Pulsatility index 4-4.3   * Power 4.7 Driver   * Flow 4.6-4.8 L/minute   Fluid status: euvolemic/slightly hypovolemic   Alarms were reviewed, and notable for occasional PI events, no speed drops or alarms   The driveline exit site was inspected, CDI.   All external components were inspected and showed no evidence of damage or malfunction, none replaced.   No changes to VAD settings made

## 2023-05-20 NOTE — CONSULTS
GASTROENTEROLOGY CONSULTATION      Date of Admission:  5/19/2023           ASSESSMENT AND RECOMMENDATIONS:   Assessment:  Eliseo Tanner is a 67 year old male with a history significant for CKDIII, HTN, HLD, NICM s/p LVAD in 2/2020 on Warfarin undergoing HK Transplant eval, and long-standing Iron Deficiency Anemia who was admitted for symptomatic Anemia. GI consulted for potential endoscopic evaluation.     #Acute on Chronic Blood Loss Anemia   #Iron Deficiency Anemia   #Hx of Long-term Anticoagulation   -Patient presents with acute on chronic baseline anemia in setting of known hx of BELA, progressive CKD, and being therapeutically anticoagulated.   -Although no overt signs of GIB, in setting of BELA, concern and doubling in BUN with improved sCr, concern for occult GIB(UGIB >LGIB)       >DDx includes AVMs vs GAVE vs. Gastritis vs. Esophagitis vs. Celiac   -Additionally, given patient's reported back pain and acute Hgb drop in absence of overt GIB, concern for potential retroperitoneal bleed.         Recommendations:  - IV PPI BID   - EGD and Colonoscopy Tentatively for 5/22/2023   - Clear liquids, no red coloring 5/21/2023  - Colonoscopy prep  - Give 2L Go-Lytely tomorrow at 1600  - Give 2L Go-Lytely 5/22/2023 at 0300  - If not clear by 0500, please give an additional 2L Go-Lytely  - NPO at midnight on 5/21 except medications.  - Can Continue with anticoagulation for procedure; important to note if pre-cancerous lesions identified, will not be able to intervene given bleeding risk   - Primary team to trend Hgb. Transfuse for Hgb < 7  or active bleeding. Keep type and screen up to date.  - Monitor for/document signs of active bleeding & monitor vital signs.  - Maintain 2 large bore IVs (18-20g) at all times  - Obtain INR pre-procedurally; goal <2   - CT A/P non-contrast to evaluate for potential hematoma.     Gastroenterology follow up recommendations: Pending Clinical Course       Patient care plan discussed with  Dr. Sanchez , GI staff physician. Thank you for involving us in this patient's care. Please do not hesitate to contact the GI service with any questions or concerns.       Mary Carmen Velasquez M.D  GI fellow  Department of Gastroenterology   -------------------------------------------------------------------------------------------------------------------   History is obtained from the patient and the medical record.          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 GI consulted for potential endoscopic evaluation.     Briefly, patient reports that over the last several weeks he has been experiencing progressive fatigue and generalized weakness worsened with even minimal exertion and associated dyspnea, near syncope, and palpitations. Denies any CP or syncope but reports frequent falls. Denies any abdominal pain,  fever, chills,  Nausea, vomiting or diarrhea. Notes scattered bruising in his upper and LE in setting of frequent falls. Denies any melena, hematochezia, hemoptysis, epistaxis, or hematuria.Denies any unintentional wt loss or change in stool caliber or consistency.   He does note several days of worsened chronic back pain from baseline.  Denies any NSAID use. Reports adherence to prescribed Warfarin.     With regards to prior endoscopies, patient reports last colonoscopy in 2012 at [place] was normal. No prior EGD.       On presentation to ED, doppler MAP 59 and HDS. Initial labs notable for  Hgb 6.7 and lactate 2.1. INR 1.6.        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 38642 and ;  Surgeon: Dalton Baeza MD;  [facility]: [facility] HEART CARDIAC CATH LAB     CV CENTRAL VENOUS CATHETER PLACEMENT N/A 02/13/2020    Procedure: Central Venous Catheter Placement;  Surgeon: Chente Moss MD;  Location: [facility] HEART CARDIAC CATH LAB     CV INTRA AORTIC BALLOON N/A 02/07/2020    Procedure: Intra-Aortic Balloon Pump Insertion;  Surgeon: Jose Baldwin MD;  [facility] HEART CARDIAC CATH LAB     CV INTRA AORTIC BALLOON N/A 02/13/2020    Procedure: Intra-Aortic Balloon Pump Insertion;  Surgeon: Chente Moss MD;  [facility] HEART CARDIAC CATH LAB     CV RIGHT HEART CATH MEASUREMENTS RECORDED N/A 09/21/2020    Procedure: CV RIGHT HEART CATH;  Surgeon: Dalton Baeza MD;  [facility] CARDIAC CATH LAB     CV RIGHT HEART CATH MEASUREMENTS RECORDED N/A 01/07/2021    Procedure: Right Heart Cath;  Surgeon: Chun Ball MD;  [facility] HEART CARDIAC CATH LAB     CV RIGHT HEART CATH MEASUREMENTS RECORDED N/A 02/10/2022    Procedure: CV RIGHT HEART CATH;  Surgeon: Dalton Baeza MD;  [facility]: [facility] HEART CARDIAC CATH LAB     CV RIGHT HEART CATH MEASUREMENTS RECORDED N/A 09/20/2022    Procedure: Right Heart Catheterization [[id number]];  Surgeon: Felisha  MD Dalton;  Location:  HEART CARDIAC CATH LAB     CV RIGHT HEART CATH MEASUREMENTS RECORDED N/A 12/12/2022    Procedure: Right Heart Cath;  Surgeon: Chente Moss MD;  Location:  HEART CARDIAC CATH LAB     CV RIGHT HEART CATH MEASUREMENTS RECORDED N/A 4/28/2023    Procedure: Right Heart Catheterization;  Surgeon: Aaliyah Fischer MD;  Location:  HEART CARDIAC CATH LAB     CV SWAN LUCIANA PROCEDURE N/A 02/13/2020    Procedure: Cyclone Luciana Procedure;  Surgeon: Chente Moss MD;  Location:  HEART CARDIAC CATH LAB     EP ICD Bilateral 03/16/2020    Procedure: EP ICD;  Surgeon: Dali Day MD;  Location:  HEART CARDIAC CATH LAB     INCISION AND DRAINAGE CHEST WASHOUT, COMBINED N/A 12/11/2022    Procedure: INCISION AND DRAINAGE OF DRIVELINE;  Surgeon: Mac Jaramillo MD;  [facility]     INSERT VENTRICULAR ASSIST DEVICE LEFT (HEARTMATE II) N/A 02/18/2020    Procedure: INSERTION, LEFT VENTRICULAR ASSIST DEVICE (HEARTMATE III);  Surgeon: Mac Jaramillo MD;  Location: [facility] OR     IR CVC TUNNEL PLACEMENT > 5 YRS OF AGE  02/23/2021     IR CVC TUNNEL REMOVAL RIGHT  03/16/2021     IR TRANSCATHETER BIOPSY  5/2/2023     MIDLINE INSERTION - DOUBLE LUMEN Left 12/15/2022    left basilic 5 fr dl midline 20 cm     THORACOSCOPY Right 03/06/2020    Procedure: RIGHT VIDEO-ASSISTED THORASCOPIC SURGERY, EVACUATION OF HEMOTHORAX, PLACEMENT OF CHEST TUBES;  Surgeon: William Gan MD;  Location: [facility] OR            Previous Endoscopy:   No results found. However, due to the size of the patient record, not all encounters were searched. Please check Results Review for a complete set of results.         Social History:   Reviewed and edited as appropriate  Social History     Socioeconomic History     Marital status:      Spouse name: Not on file     Number of children: Not on file     Years of education: Not on file     Highest education level: Not on file   Occupational  History     Not on file   Tobacco Use     Smoking status: Former     Types: Cigarettes     Quit date: 1994     Years since quittin.1     Smokeless tobacco: Never   Vaping Use     Vaping status: Never Used   Substance and Sexual Activity     Alcohol use: Not Currently     Drug use: Never     Sexual activity: Not on file   Other Topics Concern     Not on file   Social History Narrative     Not on file     Social Determinants of Health     Financial Resource Strain: Not on file   Food Insecurity: Not on file   Transportation Needs: Not on file   Physical Activity: Not on file   Stress: Not on file   Social Connections: Not on file   Intimate Partner Violence: Not on file   Housing Stability: Not on file            Family History:   Reviewed and edited as appropriate  No family history on file.        Allergies:   Reviewed and edited as appropriate     Allergies   Allergen Reactions     Heparin      HIT screen positive 20, reflex DAVINA negative; however heme recommended treating as if positive  HIT screen negative 20     Chlorhexidine Rash     Oxycodone Other (See Comments) and Itching            Medications:   (Not in a hospital admission)            Review of Systems:     A complete review of systems was performed and is negative except as noted in the HPI           Physical Exam:   BP (!) 72/51   Pulse 94   Temp 98.1  F (36.7  C)   Resp 14   Wt 88 kg (194 lb)   SpO2 100%   BMI 30.38 kg/m    Wt:   Wt Readings from Last 2 Encounters:   23 88 kg (194 lb)   23 88.1 kg (194 lb 3.2 oz)      Constitutional: cooperative, pleasant, not dyspneic/diaphoretic, no acute distress  Eyes: Sclera anicteric/injected  Ears/nose/mouth/throat: Normal oropharynx without ulcers or exudate, mucus membranes moist, hearing intact  Neck: supple, thyroid normal size  CV: No edema  Respiratory: Unlabored breathing  Lymph: No axillary, submandibular, supraclavicular or inguinal lymphadenopathy  Abd:   Nondistended, +bs, no hepatosplenomegaly, nontender, no peritoneal signs  Skin: warm, perfuse  Neuro: AAO x 3, No asterixis  Psych: Normal affect  MSK: Normal gait         Data:   Labs and imaging below were independently reviewed and interpreted    BMP  Recent Labs   Lab 05/20/23 0857 05/20/23 0030 05/16/23  0959   * 127*  --    POTASSIUM 2.6*  2.6* 2.7*  --    CHLORIDE 87* 84* 83*   CALOS 8.3* 8.3*  --    CO2 27 25  --    .0* 108.0*  --    CR 2.24* 2.34*  --    * 193*  --      CBC  Recent Labs   Lab 05/20/23  0857 05/20/23  0030   WBC 8.3 8.4   RBC 2.63* 2.28*   HGB 7.9* 6.7*   HCT 24.8* 21.5*   MCV 94 94   MCH 30.0 29.4   MCHC 31.9 31.2*   RDW 18.5* 19.4*    208     INR  Recent Labs   Lab 05/20/23  0857 05/20/23  0039 05/20/23  0030 05/16/23  0959   INR 1.50* 1.6* 1.56* 1.7*     LFTs  Recent Labs   Lab 05/20/23  0030   ALKPHOS 121   AST 78*   ALT 53*   BILITOTAL 0.6   PROTTOTAL 6.5   ALBUMIN 3.2*      PANCNo lab results found in last 7 days.

## 2023-05-20 NOTE — ED NOTES
Pt has multiple skin tears to BUE, cleaned and steri-strips applied, covered with kerlix for bleeding.          Elizabeth Loomis MSN, RN

## 2023-05-20 NOTE — ED NOTES
Bed: IN01  Expected date:   Expected time:   Means of arrival:   Comments:  Bon Krueger.  Tx from [facility].  Hx LVAD.  Hgb downtrending.  Cards aware and wants eval in ED     Neftaly Bella,   05/20/23 0103

## 2023-05-20 NOTE — ED NOTES
Bed: ED27  Expected date:   Expected time:   Means of arrival:   Comments:  Bon Krueger.  Tx from [facility].  Hx LVAD.  Hgb downtrending.  Cards aware and wants eval in ED

## 2023-05-20 NOTE — ED TRIAGE NOTES
Pt BIBA from [facility], c/o gen weakness and fall, hgb 7.2  Hx: LVAD     Triage Assessment     Row Name 05/19/23 8433       Triage Assessment (Adult)    Airway WDL WDL       Respiratory WDL    Respiratory WDL WDL       Skin Circulation/Temperature WDL    Skin Circulation/Temperature WDL WDL       Cardiac WDL    Cardiac WDL X;rhythm    Cardiac Rhythm Other (Comment)  LVAD       Peripheral/Neurovascular WDL    Peripheral Neurovascular WDL WDL       Cognitive/Neuro/Behavioral WDL    Cognitive/Neuro/Behavioral WDL WDL

## 2023-05-20 NOTE — H&P
[facility]    Cardiology History and Physical - Cardiology 2       Date of Admission:  5/19/2023    Assessment & Plan: HVSL    Eliseo Tanner is a 67 year old male with chronic systolic heart failure secondary to NICM (Stage D, NYHA Class IIIA) s/p HM 3 (2/18/2020), moderate CAD, HTN, ABHINAV on CPAP, DM2, CKD Stage III, ANA. 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 He was admitted on 5/20/23 for acute on chronic anemia associated with recurrent falls and lightheadedness.    # Acute on chronic anemia  # Chronic BELA  Hgb 6.7 on admit from baseline 9-10, had been drifting as an outpatient. No signs of overt bleeding. Hypotensive on admit (MAP 59) and has positional lightheadedness. Lactate 2.1 on admit. Unclear source for worsened anemia. Does have multiple lacerations/hematomas on arms from falls which could be contributing. No evidence of acute GI bleed given lack of hematochezia/melena or diarrhea, however, he does have known BELA and hasn't been scoped that I can see in our records. No evidence of RP bleed on exam. Hemolysis in setting of LVAD, infection, etc is also less likely.  - GI consult to consider endoscopy and/or colonoscopy  - NPO pending GI recs  - Transfuse 1 unit PRBC  - IV PPI BID  - Hemolysis labs pending  - Recheck iron labs, B12, folate  - Trend lactate  - Holding PTA warfarin and ASA  - Antihypertensive management as below  - Type and screened  - Transfuse for hgb <7  - CBC q6h    # Falls, generalized fatigue  Pt with 2 weeks of generalized fatigue, positional lightheadedness associated with frequent falls. Mildly hypotensive on admission. Symptoms likely 2/2 orthostasis in setting of acute on chronic anemia.  - CT head without intracranial bleed  -  Treating anemia as above  - Antihypertensive management as below  - PT/OT    # Acute on chronic systolic heart failure/HFrEF secondary to NICM  # Acute on chronic RV failure  # s/p HM3 LVAD  Stage D. NYHA Class IIIB.  Echo 4/28/23 septum normal, AV opening with each systole, and LVIDd 5.4 cm. RHC during last admit at 5800 rpm 4/28/23 mRA-12, mPCWP-12, JOSE Co-7, JOSE CI-3.6-note with drop in LVAD speed no significant improvement in RA with increase in wedge, weight 178 lb that day. Patient was diuresed with IV diuretics, didn't get back to EDW at time of discharge because pt wanted to go home early.  -Fluid status: mildly hypervolemic, weight 194 on admit from 188 lbs on recent discharge 5/3 (prevous EDW was 178 lb)  -Diuresis: Holding PTA torsemide 100 mg TID, hydrochlorothiazide 100 mg daily, and IV bumex three times weekly in setting of hypotension  -RV support: None. Attempted cilostazol previously but discontinued due to epistaxis.   -ACEi/ARB/ARNi: contraindicated due to renal dysfunction  -BB: contraindicated due to RV failure  -Aldosterone antagonist: contraindicated due to renal dysfunction   -SGLT2i: Hold PTA jardiance in setting of hypotension  -Afterload reduction: Hold hydralazine 50 mg tid, imdur 120 mg daily, and amlodipine 10 mg daily in setting of hypotension  -SCD prophylaxis: ICD  -LDH trends: 300s  -Anticoagulation: holding warfarin (see above), INR goal 1.7-2.3 due to epistaxis  -Antiplatelet: holding ASA 81 mg daily (see above)    # Tropinemia  Troponin 155 on admit, increased from recent baseline. No chest pain or anginal equivalents. EKG without acute ST/T changes. Most likely demand ischemia in setting of acute on chronic anemia and hypotension.  - Trend troponin to peak     # BERNARDA on CKD Stage III, stable  # Diuretic induced hypokalemia  BERNARDA during recent admission, Cr was 2.59 at discharge and admitted at 2.34. Kidney injury likely multifactorial but primary  at this time is likely  pre-renal from hypotension related to acute on chronic anemia.  - Trend BMP  - Holding PTA KCl 80 MEQ BID while holding diuretics  - K >4, Mg >2, on protocol    # Chronic hyponatremia  Na fluctuates, has been in 127-132 range for past month. Admitted at 127. Unlikely a  of pt's fatigue/falls given chronicity.  - Trend BMP     # Chronic LVAD drive line infection, MSSA and PSA.   Follows with LVAD ID, had recently been switched from cipro and daptomycin to cefepime on 5/16 d/t culture growing quinolone resistant PSA and no MSSA. Infection appears to be well controlled - he is afebrile on admit with normal WBC, and driveline site is without drainage, erythema, or pain on admission.   - Continue IV cefepime (renally dosed 1g q24h)  - Consider ID consult if inpatient issues arise  - Outpatient follow-up with LVAD ID     # ?Recently diagnosed cirrhosis  Mildly elevated LFTs, stable. Had evaluation including liver biopsy during last admission which showed cirrhosis - likely multifactorial (remote EtOH misuse, NAFLD, chronic congestive hepatopathy from RV failure). No evidence of decompensation.  - Needs outpatient follow-up with hepatology     # Hypothyroidism.   - Levothyroxine to 100 mcg daily (increased last admission)  - Repeat TSH ~6/15/23     Chronic Medical Conditions:   BPH. Continue PTA meds.   Gout. Continue Allopurinol.    DM Type II, controlled.   Mood Disorder. Continue Paxil.   ABHINAV: CPAP        Diet: Fluid restriction 2000 ML FLUID  NPO for Medical/Clinical Reasons Except for: Meds  DVT Prophylaxis: Pneumatic Compression Devices  Guevara Catheter: Not present  Cardiac Monitoring: None  Code Status: Full Code          Clinically Significant Risk Factors Present on Admission        # Hypokalemia: Lowest K = 2.7 mmol/L in last 2 days, will replace as needed  # Hyponatremia: Lowest Na = 127 mmol/L in last 2 days, will monitor as appropriate  # Hypocalcemia: Lowest Ca = 8.3 mg/dL in last 2 days, will monitor  and replace as appropriate     # Hypoalbuminemia: Lowest albumin = 3.2 g/dL at 5/20/2023 12:30 AM, will monitor as appropriate  # Drug Induced Coagulation Defect: home medication list includes an anticoagulant medication  # Drug Induced Platelet Defect: home medication list includes an antiplatelet medication   # Hypertension: Noted on problem list  # End stage heart failure: Ventricular assist device (VAD) present           Disposition Plan   Expected discharge: 4 - 7 days, recommended to pending PT/OT recs once fluid volume status optimized on oral medication and hgb stable, and anemia work-up completed.    Entered: Bianca Pratt MD 05/20/2023, 2:23 AM   The patient's care was discussed with the Patient.    Case to be formally staffed in AM    Bianca Pratt MD   Internal Medicine PGY-3    ______________________________________________________________________    Chief Complaint   Falls, lightheadedness    History is obtained from the patient    History of Present Illness   Eliseo Tanner is a 67 year old male with chronic systolic heart failure secondary to NICM (Stage D, NYHA Class IIIA)s/p HM 3 on 2/18/2020 moderate CAD, HTN, ABHINAV on CPAP, DM2, CKD Stage III, ANA. 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 He presented on 5/19/23 to ED after recurrent falls at home and lab abnormalities.    Pt with prolonged recent hospitalization for acute on chronic heart failure requiring IV diuresis. He discharged to home on 5/3/23 despite not being back to euvolemia as pt wanted to get home sooner. Pretty much since leaving the hospital, pt has been doing poorly at home. Significant generalized fatigue, lightheadedness particularly with sitting up or standing, and recurrent falls because of his lightheadedness at home (multiple  daily). Did hit his R forehead on fall from standing height on 5/18 AM, no headaches or neuro deficits. No LOC. Has been taking meds as prescribed but has had slowly increasing weight (188 on discharge, 194 lbs on admit). No dyspnea, chest pain, orthopnea, PND. Has several scrapes and bruises particularly on arms from falls but no other injuries. Denies N/V, diarrhea/constipation, hematochezia, or melena, also has no increased flank/back pain and no epistaxis. No LVAD issues or low flow alarms. Has been getting labs checked as outpatient and hgb has been down trending from baseline 9-10s to 7.2 on 5/19 prompting him going to outside ED. Was transferred to [facility] ED for further management.    Has chronic driveline infection, abx recently switched to cefepime monotherapy by LVAD ID clinic for culture growing resistant pseudomonas.     ED course:  - Vitals: afebrile, doppler MAP 59, satting well on room air  - Labs: Hgb 6.7, WBC WNL, Na 127 (around recent baseline), K 2.7, lactate 2.1,   - Interventions: replace K, CT head non-con, 1 unit PRBC ordered      Past Medical History    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)     MSSA     Legionella pneumonia (H)      LVAD (left ventricular assist device) present (H)      MSSA bacteremia 11/2020       Past Surgical History   Past Surgical History:   Procedure Laterality Date     ANESTHESIA CARDIOVERSION N/A 02/28/2020    Procedure: ANESTHESIA, FOR CARDIOVERSION;  Surgeon: GENERIC ANESTHESIA PROVIDER;  Location: [facility] OR     CV CARDIOMEMS WITH RIGHT HEART CATH N/A 09/20/2022    Procedure: Pulmonary Arterial Pressure Sensor Placement CPT Codes to be cleared by financial securing for this implant. [id number] and ;  Surgeon: Dalton Baeza MD;  Location: [facility] HEART CARDIAC CATH  LAB     CV CENTRAL VENOUS CATHETER PLACEMENT N/A 02/13/2020    Procedure: Central Venous Catheter Placement;  Surgeon: Chente Moss MD;  Location:  HEART CARDIAC CATH LAB     CV INTRA AORTIC BALLOON N/A 02/07/2020    Procedure: Intra-Aortic Balloon Pump Insertion;  Surgeon: Jose Baldwin MD;  Location:  HEART CARDIAC CATH LAB     CV INTRA AORTIC BALLOON N/A 02/13/2020    Procedure: Intra-Aortic Balloon Pump Insertion;  Surgeon: Chente Moss MD;  Location:  HEART CARDIAC CATH LAB     CV RIGHT HEART CATH MEASUREMENTS RECORDED N/A 09/21/2020    Procedure: CV RIGHT HEART CATH;  Surgeon: Dalton Baeza MD;  Location:  HEART CARDIAC CATH LAB     CV RIGHT HEART CATH MEASUREMENTS RECORDED N/A 01/07/2021    Procedure: Right Heart Cath;  Surgeon: Chun Ball MD;  Location:  HEART CARDIAC CATH LAB     CV RIGHT HEART CATH MEASUREMENTS RECORDED N/A 02/10/2022    Procedure: CV RIGHT HEART CATH;  Surgeon: Dalton Baeza MD;  Location:  HEART CARDIAC CATH LAB     CV RIGHT HEART CATH MEASUREMENTS RECORDED N/A 09/20/2022    Procedure: Right Heart Catheterization [[id number]];  Surgeon: Dalton Baeza MD;  Location:  HEART CARDIAC CATH LAB     CV RIGHT HEART CATH MEASUREMENTS RECORDED N/A 12/12/2022    Procedure: Right Heart Cath;  Surgeon: Chente Moss MD;  Location:  HEART CARDIAC CATH LAB     CV RIGHT HEART CATH MEASUREMENTS RECORDED N/A 4/28/2023    Procedure: Right Heart Catheterization;  Surgeon: Aaliyah Fischer MD;  Location:  HEART CARDIAC CATH LAB     CV SWAN LUCIANA PROCEDURE N/A 02/13/2020    Procedure: [place] Luciana Procedure;  Surgeon: Chente Moss MD;  Location:  HEART CARDIAC CATH LAB     EP ICD Bilateral 03/16/2020    Procedure: EP ICD;  Surgeon: Dali Day MD;  Location:  HEART CARDIAC CATH LAB     INCISION AND DRAINAGE CHEST WASHOUT, COMBINED N/A 12/11/2022    Procedure: INCISION AND DRAINAGE OF  DRIVELINE;  Surgeon: Mac Jaramillo MD;  Location: [facility] OR     INSERT VENTRICULAR ASSIST DEVICE LEFT (HEARTMATE II) N/A 02/18/2020    Procedure: INSERTION, LEFT VENTRICULAR ASSIST DEVICE (HEARTMATE III);  Surgeon: Mac Jaramillo MD;  Location: [facility] OR     IR CVC TUNNEL PLACEMENT > 5 YRS OF AGE  02/23/2021     IR CVC TUNNEL REMOVAL RIGHT  03/16/2021     IR TRANSCATHETER BIOPSY  5/2/2023     MIDLINE INSERTION - DOUBLE LUMEN Left 12/15/2022    left basilic 5 fr dl midline 20 cm     THORACOSCOPY Right 03/06/2020    Procedure: RIGHT VIDEO-ASSISTED THORASCOPIC SURGERY, EVACUATION OF HEMOTHORAX, PLACEMENT OF CHEST TUBES;  Surgeon: William Gan MD;  Location: [facility] OR       Prior to Admission Medications   Prior to Admission Medications   Prescriptions Last Dose Informant Patient Reported? Taking?   B Complex-C-Folic Acid (RENAL) 1 MG CAPS  Self No No   Sig: Take 1 capsule by mouth daily   PARoxetine (PAXIL) 10 MG tablet   Yes No   Sig: Take 20 mg by mouth daily   allopurinol (ZYLOPRIM) 100 MG tablet   No No   Sig: Take 2 tablets (200 mg) by mouth daily   amLODIPine (NORVASC) 5 MG tablet   No No   Sig: TAKE 2 TABLETS BY MOUTH ONCE DAILY   amiodarone (PACERONE) 200 MG tablet   No No   Sig: TAKE 1 TABLET BY MOUTH ONCE DAILY   aspirin (ASA) 81 MG EC tablet  Self No No   Sig: Take 1 tablet (81 mg) by mouth daily   co-enzyme Q-10 200 MG CAPS  Self Yes No   Sig: Take 200 mg by mouth daily   empagliflozin (JARDIANCE) 10 MG TABS tablet   No No   Sig: Take 1 tablet (10 mg) by mouth daily   finasteride (PROSCAR) 5 MG tablet  Self No No   Sig: Take 1 tablet (5 mg) by mouth daily Helps with urinary retention.   hydrALAZINE (APRESOLINE) 50 MG tablet   No No   Sig: Take 1 tablet (50 mg) by mouth 3 times daily   hydrochlorothiazide (HYDRODIURIL) 50 MG tablet   No No   Sig: Take 2 tablets (100 mg) by mouth daily   isosorbide mononitrate (IMDUR) 120 MG 24 HR ER tablet   No No   Sig: Take 1 tablet (120 mg) by mouth daily for 90  days   isosorbide mononitrate (IMDUR) 30 MG 24 hr tablet   No No   Sig: Take 4 tablets (120 mg) by mouth daily   levothyroxine (SYNTHROID/LEVOTHROID) 100 MCG tablet   No No   Sig: Take 1 tablet (100 mcg) by mouth every morning (before breakfast)   magnesium oxide (MAG-OX) 400 MG tablet   Yes No   Sig: Take 400 mg by mouth 2 times daily   methocarbamol (ROBAXIN) 500 MG tablet   No No   Sig: Take 1 tablet (500 mg) by mouth 3 times daily as needed for muscle spasms (Cramps)   omeprazole (PRILOSEC) 20 MG DR capsule  Self Yes No   Sig: Take 20 mg by mouth daily   potassium chloride ER (KLOR-CON M) 20 MEQ CR tablet   No No   Sig: Take 4 tablets (80 mEq) by mouth 2 times daily Take an additional 40 mEq on  and  with IV Bumex   senna-docusate (SENOKOT-S/PERICOLACE) 8.6-50 MG tablet  Self Yes No   Sig: Take 1 tablet by mouth 2 times daily as needed for constipation   tamsulosin (FLOMAX) 0.4 MG capsule  Self Yes No   Sig: Take 0.8 mg by mouth every evening This med helps with urinary retention   torsemide (DEMADEX) 100 MG tablet   No No   Sig: Take 1 tablet (100 mg) by mouth 3 times daily   warfarin ANTICOAGULANT (COUMADIN) 4 MG tablet   No No   Sig: Take 1 tablet (4 mg) by mouth daily   Patient taking differently: 2 mg on Sundays, 4 mg all other days   zolpidem (AMBIEN) 5 MG tablet  Self Yes No   Sig: Take 5 mg by mouth nightly as needed for Insomnia      Facility-Administered Medications Last Administration Doses Remaining   bumetanide (BUMEX) injection 5 mg None recorded            Review of Systems    The 10 point Review of Systems is negative other than noted in the HPI or here.     Social History   I have reviewed this patient's social history and updated it with pertinent information if needed.  Social History     Tobacco Use     Smoking status: Former     Types: Cigarettes     Quit date: 1994     Years since quittin.1     Smokeless tobacco: Never   Vaping Use     Vaping status: Never Used    Substance Use Topics     Alcohol use: Not Currently     Drug use: Never        Physical Exam   Vital Signs: Temp: 98.1  F (36.7  C) Temp src: Oral   Pulse: 95   Resp: 20 SpO2: 98 % O2 Device: None (Room air)    Weight: 194 lbs 0 oz    Constitutional: awake, alert, cooperative, no apparent distress, and appears stated age  Eyes: Lids and lashes normal, pupils equal, round and reactive to light, extra ocular muscles intact, sclera clear, conjunctiva normal  Respiratory: No increased work of breathing, good air exchange, clear to auscultation bilaterally, no crackles or wheezing  Cardiovascular: LVAD hum, elevated JVD ~12, mildly cooler distal extremities  GI: Normal bowel sounds, soft, non-distended, non-tender, no masses palpated, driveline in RLQ appears c/d/i without surrounding erythema or drainage  Skin: numerous ecchymoses and small lacerations/hematomas primarily to upper extremities bilaterally, no significant active bleeding  Neurologic: Awake, alert, oriented to name, place and time.  Cranial nerves II-XII are grossly intact.  Motor is 5 out of 5 bilaterally.  Cerebellar finger to nose, heel to shin intact.  Sensory is intact.  Babinski down going, Romberg negative, and gait is normal.    Medical Decision Making       Please see A&P for additional details of medical decision making.      Data   ------------------------- PAST 24 HR DATA REVIEWED -----------------------------------------------    I have personally reviewed the following data over the past 24 hrs:    8.4  \   6.7 (LL)   / 208     127 (L) 84 (L) 108.0 (H) /  193 (H)   2.7 (L) 25 2.34 (H) \       ALT: 53 (H) AST: 78 (H) AP: 121 TBILI: 0.6   ALB: 3.2 (L) TOT PROTEIN: 6.5 LIPASE: N/A       Trop: 155 (HH) BNP: 2,231 (H)       Procal: N/A CRP: N/A Lactic Acid: 2.1 (H)       INR:  1.6 (L) PTT:  N/A   D-dimer:  N/A Fibrinogen:  389       Ferritin:  222 % Retic:  N/A LDH:  306 (H)       Imaging results reviewed over the past 24 hrs:   Recent Results  (from the past 24 hour(s))   CT Head w/o Contrast    Narrative    EXAM: CT HEAD W/O CONTRAST  LOCATION: [facility]  DATE/TIME: 5/20/2023 1:53 AM CDT    INDICATION: Fall, hit head on 5 19 on warfarin  COMPARISON: None.  TECHNIQUE: Routine CT Head without IV contrast. Multiplanar reformats. Dose reduction techniques were used.    FINDINGS:  INTRACRANIAL CONTENTS: No intracranial hemorrhage, extraaxial collection, or mass effect.  No CT evidence of acute infarct. Mild presumed chronic small vessel ischemic changes. Mild generalized volume loss. No hydrocephalus.     VISUALIZED ORBITS/SINUSES/MASTOIDS: No intraorbital abnormality. No paranasal sinus mucosal disease. No middle ear or mastoid effusion.    BONES/SOFT TISSUES: No acute abnormality.      Impression    IMPRESSION:  1.  No acute intracranial process.

## 2023-05-20 NOTE — ED PROVIDER NOTES
Did not see or evaluate this patient.  I signed up for the patient, however I did not know that Dr. Bella had received referring calls about him, Dr. Bella requested to see this patient for his ED encounter prior to me seeing the patient.  Please see his note for full details regarding this patient's encounter.     Amy Seay MD  05/20/23 0146

## 2023-05-20 NOTE — PROGRESS NOTES
[facility]   Cardiology II Service / Advanced Heart Failure  Daily Progress Note      Patient: Eliseo Tanner  MRN: [id number]  Admission Date: 5/19/2023  Hospital Day # 1    Assessment and Plan: Eliseo Tanner is a 67 year old male with chronic systolic heart failure secondary to NICM (Stage D, NYHA Class IIIA) s/p HM 3 (2/18/2020), moderate CAD, HTN, ABHINAV on CPAP, DM2, CKD Stage III, ANA. 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 He was admitted on 5/20/23 for acute on chronic anemia associated with recurrent falls, lightheadedness, and hypotension.    Today's Plan:  - CT A/P w/o contrast to r/o retroperitoneal bleed/hematoma   - CXR to eval placement and alteplase for clotted PICC catheter--> PICC in distal SVC, alteplase unsuccessful. Vascular access consult to re-wire PICC  - EGD and colonoscopy tentatively 5/22/23; prep to start 5/21 per GI recs   - Monitor for/document signs of active bleeding. Transfuse to keep Hgb > 7.   - Continue to hold warfarin until after EGD/colonoscopy   - Maintain 2 large bore IVs (18-20g) at all times  - Consider starting low dose diuretics tomorrow; torsemide 50 mg TID ( mg TID)  - Hypokalemic; K 2.6 given 80 mEq KCL PO, recheck K 2.7. Not tolerating KCL peripherally and PICC clotted. Ordered another 80 mEq PO.   - BMP q12h, CBC q6h      # Acute on chronic anemia  # Chronic BELA  Hgb 6.7 on admit from baseline 9-10, had been drifting as an outpatient. No signs of overt bleeding. Hypotensive on admit (MAP 59) and has positional lightheadedness. Lactate 2.1 on admit. Unclear source for worsened anemia. Does have multiple lacerations/hematomas on arms from falls which could be contributing. No evidence of acute GI bleed given lack of hematochezia/melena or diarrhea,  however, he does have known BEAL and hasn't been scoped per review of our records. No evidence of RP bleed on exam. Hemolysis in setting of LVAD, infection, etc is also less likely.  - Transfused 1 PRBC on 5/20 for Hgb 6.7. Goal Hgb > 7. Type and Screen expires 5/23  - Continue to hold warfarin, INR daily   - Tentative plan for EGD and colonoscopy on 5/22; NPO at MN 5/21    - Give 2L Go-Lytely 5/21 at 1600  - Give 2L Go-Lytely 5/22/2023 at 0300   - If not clear by 0500, please give an additional 2L Go-Lytely  - IV PPI BID  - , mildly elevated LFTs  - Iron labs: iron 26, TSAT 10, defer IV iron in setting of transfusion 5/20   - CT A/P negative for RP bleed or other acute abdomen/pelvis findings.     # Falls, generalized fatigue  Pt with 2 weeks of generalized fatigue, positional lightheadedness associated with frequent falls. Mildly hypotensive on admission. Symptoms likely 2/2 orthostasis in setting of acute on chronic anemia.  - CT head without intracranial bleed  - Treating anemia as above  - Antihypertensive management as below  - PT/OT     # Acute on chronic systolic heart failure/HFrEF secondary to NICM  # Acute on chronic RV failure  # s/p HM3 LVAD  Stage D. NYHA Class IIIB.  Echo 4/28/23 septum normal, AV opening with each systole, and LVIDd 5.4 cm. RHC during last admit at 5800 rpm 4/28/23 mRA-12, mPCWP-12, JOSE Co-7, JOSE CI-3.6-note with drop in LVAD speed no significant improvement in RA with increase in wedge, weight 178 lb that day. Patient was diuresed with IV diuretics, didn't get back to EDW at time of discharge because pt wanted to go home early.  -Fluid status: euvolemic/mildly hypovolemic, weight 194 on admit, down to 183 lb today, reports home weights 182-183. Discharged 5/3 at 188 lb   -Diuresis: Holding PTA torsemide 100 mg TID, hydrochlorothiazide 100 mg daily, and IV bumex three times weekly in setting of hypotension  -RV support: None. Attempted cilostazol previously but discontinued  due to epistaxis.   -ACEi/ARB/ARNi: contraindicated due to renal dysfunction  -BB: contraindicated due to RV failure  -Aldosterone antagonist: contraindicated due to renal dysfunction   -SGLT2i: Hold PTA jardiance in setting of hypotension  -Afterload reduction: Hold hydralazine 50 mg tid, imdur 120 mg daily, and amlodipine 10 mg daily in setting of hypotension  -SCD prophylaxis: ICD  -LDH trends: 300s, stable   -Anticoagulation: holding warfarin (see above), INR goal 1.7-2.3 due to epistaxis  -Antiplatelet: holding ASA 81 mg daily (see above)     # Tropinemia  Troponin 155 on admit, increased from recent baseline. No chest pain or anginal equivalents. EKG without acute ST/T changes. Most likely demand ischemia in setting of acute on chronic anemia and hypotension.  - 155->148     # BERNARDA on CKD Stage III, stable  # Diuretic induced hypokalemia  BERNARDA during recent admission, Cr was 2.59 at discharge and admitted at 2.34. Kidney injury likely multifactorial but primary  at this time is likely pre-renal from hypotension related to acute on chronic anemia.  - Trend BMP  - K >4, Mg >2, on protocol     # Chronic hyponatremia  Na fluctuates, has been in 127-132 range for past month. Admitted at 127. Unlikely a  of pt's fatigue/falls given chronicity.  - Trend BMP     # Chronic LVAD drive line infection, MSSA and PSA.   Follows with LVAD ID, had recently been switched from cipro and daptomycin to cefepime on 5/16 d/t culture growing quinolone resistant PSA and no MSSA. Infection appears to be well controlled - he is afebrile on admit with normal WBC, and driveline site is without drainage, erythema, or pain on admission.   - Continue IV cefepime (renally dosed 1g q24h)  - Consider ID consult if inpatient issues arise  - Outpatient follow-up with LVAD ID     # ?Recently diagnosed cirrhosis  Mildly elevated LFTs, stable. Had evaluation including liver biopsy during last admission which showed cirrhosis - likely  multifactorial (remote EtOH misuse, NAFLD, chronic congestive hepatopathy from RV failure). No evidence of decompensation.  - CT A/P (5/20) with stable small volume ascites and mild mesenteric/retroperitoneal edema/cholelithiasis   - Needs outpatient follow-up with hepatology     # Hypothyroidism.   - Levothyroxine to 100 mcg daily (increased last admission)  - Repeat TSH ~6/15/23     Chronic Medical Conditions:   BPH. Continue PTA meds.   Gout. Continue Allopurinol.    DM Type II, controlled.   Mood Disorder. Continue Paxil.   ABHINAV: CPAP          Diet: Fluid restriction 2000 ML FLUID, Clears  DVT Prophylaxis: Pneumatic Compression Devices, holding warfarin   Guevara Catheter: Not present  Cardiac Monitoring: Telemetry   Code Status: Full Code    ================================================================    Subjective/24-Hr Events:   Last 24 hr care team notes reviewed. Transfused 1 PRBC overnight for Hgb 6.7. MAPs low stable 60-70. Reports worsening lightheadedness, fatigue, weakness x 2 weeks. Denies chest pain or shortness of breath. No signs of bleeding. Denies nausea or vomiting. Appetite has been good.     ROS:  4 point ROS including respiratory, CV, GI and  (other than that noted in the HPI) is negative.     Medications: Reviewed in EPIC.     Physical Exam:   BP (!) 72/51   Pulse 83   Temp 98.1  F (36.7  C)   Resp 19   Wt 83.3 kg (183 lb 9.6 oz)   SpO2 99%   BMI 28.76 kg/m      GENERAL: Appears comfortable, in no distress =.  HEENT: Eye symmetrical, no discharge or icterus bilaterally. Mucous membranes moist and without lesions.  NECK: Supple, JVD 6-8 cm.   CV: + LVAD hum   RESPIRATORY: Respirations regular, even, and unlabored. Lungs CTA throughout.    GI: Soft and non distended with normoactive bowel sounds present in all quadrants. No tenderness, rebound, guarding.   EXTREMITIES: No peripheral edema. 2+ bilateral pedal pulses.   NEUROLOGIC: Alert and oriented x 3. No focal deficits.    MUSCULOSKELETAL: No joint swelling or tenderness.   SKIN: No jaundice. No rashes or lesions.     Labs:  CMP  Recent Labs   Lab 05/20/23  0857 05/20/23  0039 05/20/23  0030 05/16/23  0959   *  --  127*  --    POTASSIUM 2.6*  2.6*  --  2.7*  --    CHLORIDE 87*  --  84* 83*   CO2 27  --  25  --    ANIONGAP 16*  --  18*  --    *  --  193*  --    .0*  --  108.0*  --    CR 2.24*  --  2.34*  --    GFRESTIMATED 31*  --  29*  --    CALOS 8.3*  --  8.3*  --    MAG 2.6* 2.5*  --   --    PROTTOTAL  --   --  6.5  --    ALBUMIN  --   --  3.2*  --    BILITOTAL  --   --  0.6  --    ALKPHOS  --   --  121  --    AST  --   --  78*  --    ALT  --   --  53*  --        CBC  Recent Labs   Lab 05/20/23  1222 05/20/23  0857 05/20/23  0030   WBC 8.0 8.3 8.4   RBC 2.68* 2.63* 2.28*   HGB 8.1* 7.9* 6.7*   HCT 24.9* 24.8* 21.5*   MCV 93 94 94   MCH 30.2 30.0 29.4   MCHC 32.5 31.9 31.2*   RDW 18.6* 18.5* 19.4*    194 208       INR  Recent Labs   Lab 05/20/23  0857 05/20/23  0039 05/20/23  0030 05/16/23  0959   INR 1.50* 1.6* 1.56* 1.7*       Time/Communication  I personally spent a total of 45 minutes. Of that 20 minutes was counseling/coordination of patient's care. Plan of care discussed with patient. See my note above for details.    Patient discussed with Dr. Salmeron.        Francoise Peters, CORY, NP-C  Advanced Heart Failure/Cardiology II Service  Pager [phone] [facility] [id number]

## 2023-05-21 NOTE — PLAN OF CARE
Neuro: A&Ox4.   Cardiac: HM3 LVAD. No alarms this shift. Provider notified: Low PIs - down to 1.3 when sleeping. Pt asymptomatic when woken up. PIs jump back up to 3-4 when woken up. Doppler MAPs: 74-66. Denies chest pain/pressure. Denies lightheadedness/HA.  Respiratory: Sating high 90s on RA. Home CPAP overnight. Denies SOB.  GI/: Adequate urine output: frequency in evening. Urine samples collected per orders. No BM overnight.  Diet/appetite: Tolerating clear liquid diet. Denies n/v.  Activity: A1.  Pain: Denies.  Skin: Frail. R knee abrasion covered with Mepi and L forearm abrasion wrapped in gauze- still bleeding slightly. Generalized bruising. Peeling on feet. Scabs on shins/calves. Blanchable redness on coccyx - mepi placed. Reddness in groin.  LDA's: HM3 LVAD. R PICC SL. L PIV.

## 2023-05-21 NOTE — CONSULTS
Care Management Initial Consult    General Information  Assessment completed with: Patient,    Type of CM/SW Visit: Progression of Care    Primary Care Provider verified and updated as needed: Yes   Readmission within the last 30 days: previous discharge plan unsuccessful. Last admission for fluid, patient reports dizziness this time and weakness. May be bleeding somewhere. Getting Colonoscopy tomorrow.        Advance Care Planning: Advance Care Planning Reviewed: no concerns identified-no HCD on file-Wife appropriate decision-maker       Communication Assessment  Patient's communication style: spoken language (English or Bilingual)    Hearing Difficulty or Deaf: yes   Wear Glasses or Blind: yes    Cognitive  Cognitive/Neuro/Behavioral: WDL                      Living Environment:   People in home: spouse and Grandchildren   Current living Arrangements:   House     Able to return to prior arrangements:  Yes     Family/Social Support:  Care provided by: self, spouse/significant other  Provides care for:  Nobody                Description of Support System:   Adequate family support and involvement        Current Resources:   Patient receiving home care services:  No     Community Resources:  Outpatient infusion ([facility])  Equipment currently used at home: cane, straight, grab bar, toilet, grab bar, tub/shower, shower chair  Supplies currently used at home:  LVAD and wound care supplies for driveline dressing    Employment/Financial:  Employment Status:     Retired     Financial Concerns:             Does the patient's insurance plan have a 3 day qualifying hospital stay waiver?  No    Lifestyle & Psychosocial Needs:  Social Determinants of Health     Tobacco Use: Medium Risk (5/11/2023)    Patient History      Smoking Tobacco Use: Former      Smokeless Tobacco Use: Never      Passive Exposure: Not on file   Alcohol Use: Not on file   Financial Resource Strain: Not on file   Food Insecurity: Not on  file   Transportation Needs: Not on file   Physical Activity: Not on file   Stress: Not on file   Social Connections: Not on file   Intimate Partner Violence: Not on file   Depression: Not at risk (4/14/2023)    PHQ-2      PHQ-2 Score: 2   Recent Concern: Depression - At risk (2/1/2023)    PHQ-2      PHQ-2 Score: 3   Housing Stability: Not on file       Functional Status:  Prior to admission patient needed assistance:              Mental Health Status:          Chemical Dependency Status:                Values/Beliefs:  Spiritual, Cultural Beliefs, Confucianism Practices, Values that affect care:                 Additional Information:  Pt known to SW from LVAD program. Pt had LVAD implanted 2/18/2020. Pt admitted 4/17/2023 for acute on chronic heart failure exacerbation. Discharged 5/3/2023.Pt has had several admissions over the past few months for fluid management. After most recent discharge, went home with daily IV abx and 2x/wk IV diuretic at his local clinic. Pt continues with daily IV abx at his local clinic. Pt does not have coverage for home IV abx and this is why he has to do outpatient infusion.     Pt lives with his wife and granddaughter in [facility]. Pt has great support from his wife. Pt is independent w/ ADLs, IADLs, ambulation and was driving to daily clinic appts. Has had some recent falls, however and is experiencing some decline in his overall health and physical functioning.    Palliative care consults have been offered in the past, but patient and wife have not been interested in having goals of care conversations and want to proceed with full medical care and treatment.    Patient reports being admitted this time for lightheadedness/dizziness and weakness. Worried about bleeding, so prepping to get a colonoscopy tomorrow. Patient and wife anticipate discharge home when medically ready with resumption of outpatient IV infusion. SW will continue to follow for needs that may  arise.    Betty Kincaid, Northern Maine Medical CenterSW

## 2023-05-21 NOTE — PHARMACY-ADMISSION MEDICATION HISTORY
Pharmacist Admission Medication History    Admission medication history is complete. The information provided in this note is only as accurate as the sources available at the time of the update.    Medication reconciliation/reorder completed by provider prior to medication history? Yes    Information Source(s): Patient and CareEverywhere/SureScripts via phone    Pertinent Information:     Patient knew all medications and stated that his medications are bubble-packed to help him with keeping organized.    Changes made to PTA medication list:    Added:   o Hydralazine 100 mg tablet (per fill history) - takes these instead of the 50 mg tablets that were prescribed. Takes one-half tablet three times daily  o Hydrochlorothiazide 25 mg tablet (per fill history) - takes these instead of the 50 mg tablets that were prescribed. Takes 4 tablets daily.    Deleted:   o Isosorbide 120 mg ER tablet () - taking 4 of the 30 mg ER tablets now    Changed:   o Marked hydralazine 50 mg tablets as not taking (see above) - did not delete due to recently prescribed  o Marked hydrochlorothiazide 50 mg tablets as not taking (see above) - did not delete due to recently prescribed    Allergies reviewed with patient and updates made in EHR: no    Medication History Completed By: Yvonne Little [facility] 2023 1:58 PM    Medication Sig Last Dose Taking? Auth Provider   allopurinol (ZYLOPRIM) 100 MG tablet Take 2 tablets (200 mg) by mouth daily 2023 at am Yes Alberto Garnica MD   amiodarone (PACERONE) 200 MG tablet TAKE 1 TABLET BY MOUTH ONCE DAILY 2023 at am Yes Mervat Decker PA-C   amLODIPine (NORVASC) 5 MG tablet TAKE 2 TABLETS BY MOUTH ONCE DAILY 2023 at am Yes Mervat Decker PA-C   aspirin (ASA) 81 MG EC tablet Take 1 tablet (81 mg) by mouth daily 2023 at am Yes Nader Cisneros MD   co-enzyme Q-10 200 MG CAPS Take 200 mg by mouth daily 2023 at am Yes Mono Gambino PA-C   empagliflozin  (JARDIANCE) 10 MG TABS tablet Take 1 tablet (10 mg) by mouth daily 5/19/2023 at am Yes Jessica Dutton MD   finasteride (PROSCAR) 5 MG tablet Take 1 tablet (5 mg) by mouth daily Helps with urinary retention. 5/19/2023 at am Yes Jess Meraz MD   hydrALAZINE (APRESOLINE) 100 MG tablet Take 50 mg by mouth 3 times daily 5/19/2023 at pm Yes Unknown, Entered By History   hydrochlorothiazide (HYDRODIURIL) 25 MG tablet Take 100 mg by mouth daily 5/19/2023 at am Yes Unknown, Entered By History   isosorbide mononitrate (IMDUR) 30 MG 24 hr tablet Take 4 tablets (120 mg) by mouth daily 5/19/2023 at am Yes Nader Cisneros MD   levothyroxine (SYNTHROID/LEVOTHROID) 100 MCG tablet Take 1 tablet (100 mcg) by mouth every morning (before breakfast) 5/19/2023 at am Yes Jessica Dutton MD   magnesium oxide (MAG-OX) 400 MG tablet Take 400 mg by mouth 2 times daily Past Week at am Yes Laine Leon APRN CNP   methocarbamol (ROBAXIN) 500 MG tablet Take 1 tablet (500 mg) by mouth 3 times daily as needed for muscle spasms (Cramps) Past Month at prn Yes Alberto Garnica MD   omeprazole (PRILOSEC) 20 MG DR capsule Take 20 mg by mouth daily 5/19/2023 at am Yes Unknown, Entered By History   PARoxetine (PAXIL) 10 MG tablet Take 20 mg by mouth daily 5/19/2023 at am Yes Reported, Patient   potassium chloride ER (KLOR-CON M) 20 MEQ CR tablet Take 4 tablets (80 mEq) by mouth 2 times daily Take an additional 40 mEq on Tuesdays and Saturdays with IV Bumex 5/19/2023 at am Yes Nader Cisneros MD   senna-docusate (SENOKOT-S/PERICOLACE) 8.6-50 MG tablet Take 1 tablet by mouth 2 times daily as needed for constipation More than a month at prn Yes Unknown, Entered By History   tamsulosin (FLOMAX) 0.4 MG capsule Take 0.8 mg by mouth every evening This med helps with urinary retention 5/18/2023 at pm Yes Nader Cisneros MD   torsemide (DEMADEX) 100 MG tablet Take 1 tablet (100 mg) by mouth 3 times daily 5/19/2023 at pm Yes Nader Cisneros MD    warfarin ANTICOAGULANT (COUMADIN) 4 MG tablet Take 1 tablet (4 mg) by mouth daily  Patient taking differently: 2 mg on Sundays, 4 mg all other days 5/18/2023 at 1800 Yes Nader Cisneros MD   zolpidem (AMBIEN) 5 MG tablet Take 5 mg by mouth nightly as needed for Insomnia 5/18/2023 Yes Reported, Patient   B Complex-C-Folic Acid (RENAL) 1 MG CAPS Take 1 capsule by mouth daily 5/19/2023 at am  Nader Cisneros MD   hydrALAZINE (APRESOLINE) 50 MG tablet Take 1 tablet (50 mg) by mouth 3 times daily  Patient not taking: Reported on 5/21/2023 Not Taking at na  Jessica Dutton MD   hydrochlorothiazide (HYDRODIURIL) 50 MG tablet Take 2 tablets (100 mg) by mouth daily  Patient not taking: Reported on 5/21/2023 Not Taking at na  Nader Cisneros MD

## 2023-05-21 NOTE — PROGRESS NOTES
[facility]   Cardiology II Service / Advanced Heart Failure  Daily Progress Note      Patient: Eliseo Tanner  MRN: [id number]  Admission Date: 5/19/2023  Hospital Day # 2    Assessment and Plan: Eliseo Tanner is a 67 year old male with chronic systolic heart failure secondary to NICM (Stage D, NYHA Class IIIA) s/p HM 3 (2/18/2020), moderate CAD, HTN, ABHINAV on CPAP, DM2, CKD Stage III, ANA. 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 He was admitted on 5/20/23 for acute on chronic anemia associated with recurrent falls, lightheadedness, and hypotension.    Today's Plan:  - EGD and colonoscopy tentatively 5/22/23; Go Lytely prep to start today. NPO at MN.   - Monitor for/document signs of active bleeding. Transfuse to keep Hgb > 7.   - Continue to hold warfarin until after EGD/colonoscopy   - Continue to hold diuresis and afterload reduction secondary to hypotension  - BMP q12h, CBC q6h  - Dose warfarin 2 mg x 1 dose this evening, goal INR < 2 for EGD/colonoscopy 5/22      # Acute on chronic anemia  # Chronic BELA  Hgb 6.7 on admit from baseline 9-10, had been drifting as an outpatient. No signs of overt bleeding. Hypotensive on admit (MAP 59) and has positional lightheadedness. Lactate 2.1 on admit. Unclear source for worsened anemia. Does have multiple lacerations/hematomas on arms from falls which could be contributing. No evidence of acute GI bleed given lack of hematochezia/melena or diarrhea, however, he does have known BELA and hasn't been scoped per review of our records. No evidence of RP bleed on exam. Hemolysis in setting of LVAD, infection, etc is also less likely.  - Transfused 1 PRBC on 5/20 for Hgb 6.7. Goal Hgb > 7. Type and Screen expires 5/23  - Continue to hold warfarin and ASA, INR daily   -  Tentative plan for EGD and colonoscopy on 5/22; NPO at MN 5/21    - Give 2L Go-Lytely 5/21 at 1600  - Give 2L Go-Lytely 5/22/2023 at 0300   - If not clear by 0500, please give an additional 2L Go-Lytely  - IV PPI BID  - LDH 300s-stable, mildly elevated LFTs  - Iron labs: iron 26, TSAT 10, defer IV iron in setting of transfusion 5/20. -Consider outpatient iron supplementation (PO vs IV)   - CT A/P negative for RP bleed or other acute abdomen/pelvis findings      # Falls, generalized fatigue  Pt with 2 weeks of generalized fatigue, positional lightheadedness associated with frequent falls. Mildly hypotensive on admission. Symptoms likely 2/2 orthostasis in setting of acute on chronic anemia.  - CT head without intracranial bleed  - Treating anemia as above  - Antihypertensive management as below  - PT/OT     # Acute on chronic systolic heart failure/HFrEF secondary to NICM  # Acute on chronic RV failure  # s/p HM3 LVAD  Stage D. NYHA Class IIIB.  Echo 4/28/23 septum normal, AV opening with each systole, and LVIDd 5.4 cm. RHC during last admit at 5800 rpm 4/28/23 mRA-12, mPCWP-12, JOSE Co-7, JOSE CI-3.6-note with drop in LVAD speed no significant improvement in RA with increase in wedge, weight 178 lb that day. Patient was diuresed with IV diuretics, didn't get back to EDW at time of discharge because pt wanted to go home early.  -Fluid status: euvolemic/mildly hypovolemic, weight 183 on admit, down to 180 lb today, reports home weights 182-183. Last discharge weight 188 on 5/3.   -Diuresis: Holding PTA torsemide 100 mg TID, hydrochlorothiazide 100 mg daily, and IV bumex three times weekly in setting of hypotension  -RV support: None. Attempted cilostazol previously but discontinued due to epistaxis.   -ACEi/ARB/ARNi: contraindicated due to renal dysfunction  -BB: contraindicated due to RV failure  -Aldosterone antagonist: contraindicated due to renal dysfunction   -SGLT2i: Hold PTA jardiance in setting of  hypotension  -Afterload reduction: Hold hydralazine 50 mg tid, imdur 120 mg daily, and amlodipine 10 mg daily in setting of hypotension  -SCD prophylaxis: ICD  -LDH trends: 300s, stable   -Anticoagulation: holding warfarin (see above), INR goal 1.7-2.3 due to epistaxis  -Antiplatelet: holding ASA 81 mg daily (see above)     # Tropinemia, improved   Troponin 155 on admit, increased from recent baseline. No chest pain or anginal equivalents. EKG without acute ST/T changes. Most likely demand ischemia in setting of acute on chronic anemia and hypotension.  - 155->148     # BERNARDA on CKD Stage III, stable  # Diuretic induced hypokalemia  BERNARDA during recent admission, Cr was 2.59 at discharge and admitted at 2.34. Kidney injury likely multifactorial but primary  at this time is likely pre-renal from hypotension related to acute on chronic anemia.  - Cr 1.90, trend BMP  - K >4, Mg >2, on protocol     # Chronic hyponatremia  Na fluctuates, has been in 127-132 range for past month. Admitted at 127. Unlikely a  of pt's fatigue/falls given chronicity.  - Na 130, trend BMP     # Chronic LVAD drive line infection, MSSA and PSA.   Follows with LVAD ID, had recently been switched from cipro and daptomycin to cefepime on 5/16 d/t culture growing quinolone resistant PSA and no MSSA. Infection appears to be well controlled - he is afebrile on admit with normal WBC, and driveline site is without drainage, erythema, or pain on admission.   - Continue IV cefepime (renally dosed 1g q24h)-started 5/11. Dapto stopped 5/11; driveline cx neg MSSA  - Consider ID consult if inpatient issues arise  - Outpatient follow-up with LVAD ID (virtual mid-June)     # ?Recently diagnosed cirrhosis  Mildly elevated LFTs, stable. Had evaluation including liver biopsy during last admission which showed cirrhosis - likely multifactorial (remote EtOH misuse, NAFLD, chronic congestive hepatopathy from RV failure). No evidence of decompensation.  - CT  "A/P (5/20) with stable small volume ascites and mild mesenteric/retroperitoneal edema/cholelithiasis   - Needs outpatient follow-up with hepatology     # Hypothyroidism.   - Levothyroxine to 100 mcg daily (increased 5/2), repeat TSH 8.09/T4 0.97 on 5/21  - Repeat TSH ~6/15/23     Chronic Medical Conditions:   BPH. Continue PTA meds.   Gout. Continue Allopurinol.    DM Type II, controlled.   Mood Disorder. Continue Paxil.   ABHINAV: CPAP          Diet: Fluid restriction 2000 ML FLUID, Clears  DVT Prophylaxis: Pneumatic Compression Devices, holding warfarin   Guevara Catheter: Not present  Cardiac Monitoring: Telemetry   Code Status: Full Code    ================================================================    Subjective/24-Hr Events:   Last 24 hr care team notes reviewed. No acute events overnight. Slept well. Continue to feel fatigued. Denies lightheadedness.  Denies chest pain or shortness of breath. No signs of bleeding. Denies abdominal pain, nausea, or vomiting. Appetite has been good.     ROS:  4 point ROS including respiratory, CV, GI and  (other than that noted in the HPI) is negative.     Medications: Reviewed in EPIC.     Physical Exam:   BP (!) 78/70 (BP Location: Left arm)   Pulse 97   Temp 97.9  F (36.6  C) (Oral)   Resp 18   Ht 1.702 m (5' 7\")   Wt 82 kg (180 lb 12.4 oz)   SpO2 98%   BMI 28.31 kg/m      GENERAL: Appears comfortable, in no distress.  HEENT: Eye symmetrical, no discharge or icterus bilaterally. Mucous membranes moist and without lesions.  NECK: Supple, JVD 8 cm.   CV: + LVAD hum   RESPIRATORY: Respirations regular, even, and unlabored. Lungs CTA throughout.    GI: Soft and non distended with normoactive bowel sounds present in all quadrants. No tenderness, rebound, guarding.   EXTREMITIES: No peripheral edema. 2+ bilateral pedal pulses.   NEUROLOGIC: Alert and oriented x 3. No focal deficits.   MUSCULOSKELETAL: No joint swelling or tenderness.   SKIN: No jaundice. No rashes or " lesions.     Labs:  CMP  Recent Labs   Lab 05/21/23  0558 05/21/23  0013 05/20/23  1759 05/20/23  1511 05/20/23  0857 05/20/23  0039 05/20/23  0030   * 130* 131*  --  130*  --  127*   POTASSIUM 4.2 3.8 2.8* 2.7* 2.6*  2.6*  --  2.7*   CHLORIDE 96* 92* 90*  --  87*  --  84*   CO2 25 25 26  --  27  --  25   ANIONGAP 11 13 15  --  16*  --  18*   * 106* 115*  --  143*  --  193*   BUN 89.2* 95.0* 103.0*  --  110.0*  --  108.0*   CR 1.79* 1.90* 1.99*  --  2.24*  --  2.34*   GFRESTIMATED 41* 38* 36*  --  31*  --  29*   CALOS 8.5* 8.2* 8.4*  --  8.3*  --  8.3*   MAG 2.4*  --   --   --  2.6* 2.5*  --    PROTTOTAL  --   --   --   --   --   --  6.5   ALBUMIN  --   --   --   --   --   --  3.2*   BILITOTAL  --   --   --   --   --   --  0.6   ALKPHOS  --   --   --   --   --   --  121   AST  --   --   --   --   --   --  78*   ALT  --   --   --   --   --   --  53*       CBC  Recent Labs   Lab 05/21/23 0558 05/21/23  0013 05/20/23 1759 05/20/23  1222   WBC 6.6 7.8 8.0 8.0   RBC 2.61* 2.77* 2.82* 2.68*   HGB 7.7* 8.2* 8.4* 8.1*   HCT 25.3* 25.7* 26.0* 24.9*   MCV 97 93 92 93   MCH 29.5 29.6 29.8 30.2   MCHC 30.4* 31.9 32.3 32.5   RDW 18.9* 18.7* 18.4* 18.6*    192 226 188       INR  Recent Labs   Lab 05/21/23  0558 05/20/23  0857 05/20/23  0039 05/20/23  0030   INR 1.38* 1.50* 1.6* 1.56*       Time/Communication  I personally spent a total of 45 minutes. Of that 20 minutes was counseling/coordination of patient's care. Plan of care discussed with patient. See my note above for details.    Patient discussed with Dr. Salmeron.        Francoise Peters DNP, NP-C  Advanced Heart Failure/Cardiology II Service  Pager [phone] [facility] [id number]

## 2023-05-21 NOTE — PROGRESS NOTES
Admission          5/19/2023 11:49 PM  -----------------------------------------------------------  Reason for admission:  Primary team notified of pt arrival.  Admitted from: Home -> ED  Via: stretcher  Accompanied by: family  Belongings: Placed in closet; valuables sent home with family  Admission Profile: complete  Teaching: orientation to unit and call light- call light within reach, call don't fall, use of console, meal times, when to call for the RN, and enforced importance of safety   Access: PIV/PICC  Telemetry:Placed on pt  Ht./Wt.: complete  Code Status verified on armband: yes  2 RN Skin Assessment Completed By: Dana LIGHT and Tari BROOKE  Med Rec completed: no, unsure, will do with family when able  Bed surface reassessed with algorithm and charted: yes  New bed surface ordered: no  Suction/Ambu bag/Flowmeter at bedside: yes  Is patient having diarrhea upon admission- if YES fill out testing algorithm : no    C. Diff Testing Algorithm (MUST be marked YES)   1. 3 or more loose stools in 24 hrs. [] Yes [] No       Additional symptoms:(At least ONE must be marked yes)   1. Abdominal pain/discomfort [] Yes [] No   2. Fever at least 38C (100.4 F) [] Yes [] No   3. Elevated WBC(>11,000) [] Yes [] No       Exclusion Criteria:  (MUST be marked YES)   1. Off laxatives for at least 48 hrs. [] Yes [] No       Pt status:    Temp:  [97.6  F (36.4  C)-98.5  F (36.9  C)] 98.3  F (36.8  C)  Pulse:  [] 88  Resp:  [14-41] 18  BP: (62-78)/(40-70) 78/70  SpO2:  [94 %-100 %] 100 %

## 2023-05-21 NOTE — PROGRESS NOTES
"   05/21/23 1050   Appointment Info   Signing Clinician's Name / Credentials (PT) SANDRA Sotomayor   Student Supervision Direct Patient Contact Provided;Therapy services provided with the co-signing licensed therapist guiding and directing the services, and providing the skilled judgement and assessment throughout the session   Rehab Comments (PT) LVAD   Living Environment   People in Home spouse;grandchild(arnold)   Current Living Arrangements house   Home Accessibility wheelchair accessible   Transportation Anticipated family or friend will provide   Living Environment Comments Patient lives in a house w/ his wife and granddaughter. He states there is now a ramp to enter if needed, and stairs within the house (to the basement) that he does not need to use.   Self-Care   Usual Activity Tolerance moderate   Current Activity Tolerance fair   Regular Exercise No   Equipment Currently Used at Home cane, straight;grab bar, toilet;grab bar, tub/shower;shower chair   Fall history within last six months yes   Number of times patient has fallen within last six months 1   Activity/Exercise/Self-Care Comment Patient mostly independent w/ ADLs at home; his wife is still working and so he is home alone most of the day. Very limited activity tolerance but enjoys working on projects in the garage.   General Information   Onset of Illness/Injury or Date of Surgery 05/19/23   Referring Physician Bianca Pratt MD   Patient/Family Therapy Goals Statement (PT) Return to home/PLOF   Pertinent History of Current Problem (include personal factors and/or comorbidities that impact the POC) Per EMR, \"Eliseo Tanner is a 67 year old male with a past medical history significant for CHF s/p LVAD in place (02/2020), cardiac arrest, CAD, nonischemic cardiomyopathy, paroxysmal atrial fibrillation (on anticoagulation), stage IV CKD, BERNARDA, DM2 without long-term use of insulin, HTN, and HLD who presents to the ED as a transfer from outside " "hospital for admission to cardiology. Patient initially presented to [facility]... with dizziness, generalized weakness, and recurrent falls. It was noted that he had a low hemoglobin. He endorses increased dizziness, lightheadedness, and fatigue when upright and ambulating, resulting in frequent falling\"   Existing Precautions/Restrictions fall;other (see comments)  (LVAD)   Heart Disease Risk Factors Diabetes;High blood pressure;Overweight;Medical history;Gender;Age;Dislipidemia   Cognition   Affect/Mental Status (Cognition) WFL   Orientation Status (Cognition) oriented x 4   Follows Commands (Cognition) WFL   Cognitive Status Comments Patient is generally WFL w/ cognition and command-following. He is somewhat [place] and slow to carry out instructions.   Pain Assessment   Patient Currently in Pain No   Posture    Posture Forward head position;Protracted shoulders   Range of Motion (ROM)   Range of Motion ROM is WFL   ROM Comment ROM WFL per bed mobility, transfers and brief ambulation   Strength (Manual Muscle Testing)   Strength (Manual Muscle Testing) Deficits observed during functional mobility   Strength Comments Grossly 3/5 per functional mobility, but generalized weakness observed with transfers   Bed Mobility   Comment, (Bed Mobility) Grossly decreased strength per rolling, supine to sit   Transfers   Comment, (Transfers) Sit to stand from EOB w/ CGA, heavy use of UEs   Gait/Stairs (Locomotion)   [place] Level (Gait) contact guard   Assistive Device (Gait) walker, 4-wheeled   Distance in Feet 10   Comment, (Gait/Stairs) Patient ambulates ~10' to doorway of room w/ CGA for safety, 4WW, slow and short steps and slightly downward gaze   Balance   Balance Comments Adequate seated balance EOB w/ B UE support   Clinical Impression   Criteria for Skilled Therapeutic Intervention Yes, treatment indicated   PT Diagnosis (PT) Impaired functional mobility   Influenced by the following impairments " Weakness, decreased balance, decreased activity tolerance   Functional limitations due to impairments Bed mobility, transfers, ambulation, stairs, endurance   Clinical Presentation (PT Evaluation Complexity) Stable/Uncomplicated   Clinical Presentation Rationale Clinical judgement   Clinical Decision Making (Complexity) low complexity   Planned Therapy Interventions (PT) balance training;bed mobility training;gait training;home exercise program;neuromuscular re-education;patient/family education;postural re-education;ROM (range of motion);stair training;strengthening;stretching;transfer training;wheelchair management/propulsion training;risk factor education   Risk & Benefits of therapy have been explained evaluation/treatment results reviewed;care plan/treatment goals reviewed;risks/benefits reviewed;current/potential barriers reviewed;participants voiced agreement with care plan;participants included;patient   PT Total Evaluation Time   PT Olamide, Low Complexity Minutes (60841) 14   Physical Therapy Goals   PT Frequency 5x/week   PT Predicted Duration/Target Date for Goal Attainment 06/02/23   PT Goals Bed Mobility;Transfers;Gait;Aerobic Activity   PT: Bed Mobility Independent;Rolling;Bridging   PT: Transfers Modified independent;Sit to/from stand;Bed to/from chair  (w/ LRAD)   PT: Gait Modified independent;Rolling walker;150 feet   PT: Perform aerobic activity with stable cardiovascular response continuous activity;10 minutes;ambulation;NuStep   PT Discharge Planning   PT Plan progress ambulation w/ 4WW/FWW; NuStep; LE exercises; trial stairs if appropriate   PT Discharge Recommendation (DC Rec) Transitional Care Facility;home with assist   PT Rationale for DC Rec Patient demonstrates markedly decreased activity tolerance and weakness. Will likely require TCU for continued cares and therapies once medically able to d/c from hospital. With very good progress in hospital, may be appropriate to d/c to home w/ assist,  pending level of assist able to be offered by wife and granddaughter.   PT Brief overview of current status A x1 transfers; CGA w/ 4WW

## 2023-05-21 NOTE — PROCEDURES
The patient's HeartMate LVAD was interrogated 5/21/2023  * Speed 5800 rpm   * Pulsatility index 1.9-4   * Power 4.7 Driver   * Flow 5.1 L/minute   Fluid status: euvolemic/mildly hypovolemic   Alarms were reviewed, and notable for frequent PI events. No low flows or power spikes, speed drops, or other findings suspicious of pump malfunction noted.   The driveline exit site was inspected, CDI.   All external components were inspected and showed no evidence of damage or malfunction, none replaced.   Hematocrit adjusted in LVAD parameters.

## 2023-05-22 NOTE — PLAN OF CARE
Neuro: A&Ox4.   Cardiac: HM3 LVAD. No alarms this shift.  Low PIs - down to 2.8 when sleeping. Pt asymptomatic. Doppler MAPs: 68-72 Denies chest pain/pressure. Denies lightheadedness/HA.  Respiratory: Sating high 90s on RA. Home CPAP overnight. Denies SOB.  GI/: Adequate urine output. Golytely prep finished at: 03:00 -> stool still brown, another 2 L Golytely ordered and started at 05:30. Denies n/v.  Diet/appetite: Tolerating clear liquid diet. 2 L  FR - only Golytely given overnight.  Activity: A1.  Pain: Denies.  Skin: Frail. New R forearm skin tear at start of shift. Pt unsure of how it happened. Non-adhesive dressing applied. Sacral mepi replaced.   LDA's: HM3 LVAD. R PICC SL. L PIV.

## 2023-05-22 NOTE — PROGRESS NOTES
[facility]   Cardiology II Service / Advanced Heart Failure  Daily Progress Note      Patient: Eliseo Tanner  MRN: [id number]  Admission Date: 5/19/2023  Hospital Day # 3    Assessment and Plan: Eliseo Tanner is a 67 year old male with chronic systolic heart failure secondary to NICM (Stage D, NYHA Class IIIA) s/p HM 3 (2/18/2020), moderate CAD, HTN, ABHINAV on CPAP, DM2, CKD Stage III, ANA. 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 He was admitted on 5/20/23 for acute on chronic anemia associated with recurrent falls, lightheadedness, and hypotension.    Today's Plan:  - Replete K with 40 mEq IV + 40 mEq PO  - EGD and colonoscopy today; further work-up pending results   - Monitor for/document signs of active bleeding. Transfuse to keep Hgb > 7.   - Resume low dose torsemide 50 mg TID ( mg TID) with KCL 60 mEq BID  - Resume warfarin tomorrow   - BMP q12h, CBC q6h      # Acute on chronic anemia  # Chronic BELA  Hgb 6.7 on admit from baseline 9-10, had been drifting as an outpatient. No signs of overt bleeding. Hypotensive on admit (MAP 59) and has positional lightheadedness. Lactate 2.1 on admit. Unclear source for worsened anemia. Does have multiple lacerations/hematomas on arms from falls which could be contributing. No evidence of acute GI bleed given lack of hematochezia/melena or diarrhea, however, he does have known BELA and hasn't been scoped per review of our records. No evidence of RP bleed on exam. Hemolysis in setting of LVAD, infection, etc is also less likely.  - Transfused 1 PRBC on 5/20 for Hgb 6.7. Goal Hgb > 7. Type and Screen expires 5/23  - Continue to hold warfarin and ASA, INR daily   - EGD and colonoscopy on 5/22  - IV PPI BID  - LDH 300s-stable, mildly elevated LFTs  - Iron labs: iron  26, TSAT 10, defer IV iron in setting of transfusion 5/20. - Consider outpatient iron supplementation (PO vs IV)  - CT A/P negative for RP bleed or other acute abdomen/pelvis findings      # Falls, generalized fatigue  Pt with 2 weeks of generalized fatigue, positional lightheadedness associated with frequent falls. Mildly hypotensive on admission. Symptoms likely 2/2 orthostasis in setting of acute on chronic anemia.  - CT head without intracranial bleed  - Treating anemia as above  - Antihypertensive management as below  - PT/OT     # Acute on chronic systolic heart failure/HFrEF secondary to NICM  # Acute on chronic RV failure  # s/p HM3 LVAD  Stage D. NYHA Class IIIB.  Echo 4/28/23 septum normal, AV opening with each systole, and LVIDd 5.4 cm. RHC during last admit at 5800 rpm 4/28/23 mRA-12, mPCWP-12, JOSE Co-7, JOSE CI-3.6-note with drop in LVAD speed no significant improvement in RA with increase in wedge, weight 178 lb that day. Patient was diuresed with IV diuretics, didn't get back to EDW at time of discharge because pt wanted to go home early.  -Fluid status: euvolemic/mildly hypovolemic, weight 183 on admit, down to  lb, reports home weights 182-183. Last discharge on 5/3 weight 188 lb.   -Diuresis: Holding PTA torsemide 100 mg TID, hydrochlorothiazide 100 mg daily, and IV bumex three times weekly in setting of hypotension  -RV support: None. Attempted cilostazol previously but discontinued due to epistaxis.   -ACEi/ARB/ARNi: contraindicated due to renal dysfunction  -BB: contraindicated due to RV failure  -Aldosterone antagonist: contraindicated due to renal dysfunction   -SGLT2i: Hold PTA jardiance in setting of hypotension  -Afterload reduction: Hold hydralazine 50 mg tid, imdur 120 mg daily, and amlodipine 10 mg daily in setting of hypotension  -SCD prophylaxis: ICD  -LDH trends: 300s, stable   -Anticoagulation: holding warfarin (see above), INR goal 1.7-2.3 due to epistaxis  -Antiplatelet:  holding ASA 81 mg daily (see above)     # Tropinemia, improved   Troponin 155 on admit, increased from recent baseline. No chest pain or anginal equivalents. EKG without acute ST/T changes. Most likely demand ischemia in setting of acute on chronic anemia and hypotension.  - 155->148     # BERNARDA on CKD Stage III, stable  # Diuretic induced hypokalemia  BERNARDA during recent admission, Cr was 2.59 at discharge and admitted at 2.34. Kidney injury likely multifactorial but primary  at this time is likely pre-renal from hypotension related to acute on chronic anemia.  - Cr 1.45, trend BMP  - K >4, Mg >2, on protocol     # Chronic hyponatremia  Na fluctuates, has been in 127-132 range for past month. Admitted at 127. Unlikely a  of pt's fatigue/falls given chronicity.  - Na 130, trend BMP     # Chronic LVAD drive line infection, MSSA and PSA.   Follows with LVAD ID, had recently been switched from cipro and daptomycin to cefepime on 5/16 d/t culture growing quinolone resistant PSA and no MSSA. Infection appears to be well controlled - he is afebrile on admit with normal WBC, and driveline site is without drainage, erythema, or pain on admission.   - Continue IV cefepime (renally dosed 1g q24h)-started 5/11. Dapto stopped 5/11; driveline cx neg MSSA  - Consider ID consult if inpatient issues arise  - Outpatient follow-up with LVAD ID (virtual mid-June)     # ?Recently diagnosed cirrhosis  Mildly elevated LFTs, stable. Had evaluation including liver biopsy during last admission which showed cirrhosis - likely multifactorial (remote EtOH misuse, NAFLD, chronic congestive hepatopathy from RV failure). No evidence of decompensation.  - CT A/P (5/20) with stable small volume ascites and mild mesenteric/retroperitoneal edema/cholelithiasis   - Needs outpatient follow-up with hepatology     # Hypothyroidism.   - Levothyroxine to 100 mcg daily (increased 5/2), repeat TSH 8.09/T4 0.97 on 5/21  - Repeat TSH  "~6/15/23     Chronic Medical Conditions:   BPH. Continue PTA meds.   Gout. Continue Allopurinol.    DM Type II, controlled.   Mood Disorder. Continue Paxil.   ABHINAV: CPAP        Diet: Fluid restriction 2000 ML FLUID, Clears  DVT Prophylaxis: Pneumatic Compression Devices, holding warfarin   Guevara Catheter: Not present  Cardiac Monitoring: Telemetry   Code Status: Full Code    ================================================================    Subjective/24-Hr Events:   Last 24 hr care team notes reviewed. No acute events overnight. Received Golytely prep with adequate results. Did not sleep well d/t frequent BMs. Continues to deny and signs of bleeding. Denies N/V and abdominal pain. Feeling hungry. No lightheadedness, syncope, or near syncope.     ROS:  4 point ROS including respiratory, CV, GI and  (other than that noted in the HPI) is negative.     Medications: Reviewed in EPIC.     Physical Exam:   /65   Pulse 93   Temp 97.8  F (36.6  C) (Oral)   Resp 11   Ht 1.702 m (5' 7\")   Wt 82.5 kg (181 lb 14.1 oz)   SpO2 90%   BMI 28.49 kg/m      GENERAL: Appears comfortable, in no distress.  HEENT: Eye symmetrical, no discharge or icterus bilaterally. Mucous membranes moist and without lesions.  NECK: Supple, JVD lower 1/3 of neck at 45 degrees   CV: + LVAD hum   RESPIRATORY: Respirations regular, even, and unlabored. Lungs CTA throughout.    GI: Soft and non distended with normoactive bowel sounds present in all quadrants. No tenderness, rebound, guarding.   EXTREMITIES: No peripheral edema. 2+ bilateral pedal pulses.   NEUROLOGIC: Alert and oriented x 3. No focal deficits.   MUSCULOSKELETAL: No joint swelling or tenderness.   SKIN: No jaundice. No rashes or lesions.     Labs:  CMP  Recent Labs   Lab 05/22/23  1056 05/22/23  0619 05/21/23  1904 05/21/23  0558 05/21/23  0013 05/20/23  1511 05/20/23  0857 05/20/23  0039 05/20/23  0030   NA  --  135* 131* 132* 130*   < > 130*  --  127*   POTASSIUM 4.3 2.7* 3.6 " 4.2 3.8   < > 2.6*  2.6*  --  2.7*   CHLORIDE  --  97* 95* 96* 92*   < > 87*  --  84*   CO2  --  27 23 25 25   < > 27  --  25   ANIONGAP  --  11 13 11 13   < > 16*  --  18*   GLC  --  137* 199* 104* 106*   < > 143*  --  193*   BUN  --  57.3* 71.3* 89.2* 95.0*   < > 110.0*  --  108.0*   CR  --  1.45* 1.59* 1.79* 1.90*   < > 2.24*  --  2.34*   GFRESTIMATED  --  52* 47* 41* 38*   < > 31*  --  29*   CALOS  --  8.1* 8.2* 8.5* 8.2*   < > 8.3*  --  8.3*   MAG  --  2.3  --  2.4*  --   --  2.6* 2.5*  --    PROTTOTAL  --   --   --   --   --   --   --   --  6.5   ALBUMIN  --   --   --   --   --   --   --   --  3.2*   BILITOTAL  --   --   --   --   --   --   --   --  0.6   ALKPHOS  --   --   --   --   --   --   --   --  121   AST  --   --   --   --   --   --   --   --  78*   ALT  --   --   --   --   --   --   --   --  53*    < > = values in this interval not displayed.       CBC  Recent Labs   Lab 05/22/23  1056 05/22/23  0619 05/21/23  2338 05/21/23  1904   WBC 5.9 5.7 5.8 6.8   RBC 2.63* 2.62* 2.56* 2.84*   HGB 7.8* 7.9* 7.7* 8.4*   HCT 25.4* 26.0* 25.1* 27.6*   MCV 97 99 98 97   MCH 29.7 30.2 30.1 29.6   MCHC 30.7* 30.4* 30.7* 30.4*   RDW 18.7* 18.9* 18.9* 18.8*    175 186 203       INR  Recent Labs   Lab 05/22/23  0619 05/21/23  0558 05/20/23  0857 05/20/23  0039   INR 1.32* 1.38* 1.50* 1.6*       Time/Communication  I personally spent a total of 35 minutes. Of that 15 minutes was counseling/coordination of patient's care. Plan of care discussed with patient. See my note above for details.    Patient discussed with Dr. Salmeron.        Francoise Peters DNP, NP-C  Advanced Heart Failure/Cardiology II Service  Pager [phone] [facility] [id number]

## 2023-05-22 NOTE — PLAN OF CARE
Neuro: A&Ox4.   Cardiac: SR with BBB. Doppler MAPS 66-78  Respiratory: Sating  on RA.  GI/: Adequate urine output in urinal. Colostomy prep started  Diet/appetite: Tolerating clear liquid diet. Eating well.  Activity:  SBA up to chair and in halls.  Pain: At acceptable level on current regimen.   Skin: Skin tears on arms and knee  LDA's: PICC x1     Plan: Continue with POC. Notify primary team with changes.

## 2023-05-22 NOTE — PROCEDURES
The patient's HeartMate LVAD was interrogated 5/22/2023  * Speed 5800 rpm   * Pulsatility index 1.9-4.1   * Power 4.7 Driver   * Flow 5.0 L/minute   Fluid status: euvolemic    Alarms were reviewed, and notable for frequent PI events. No low flows or power spikes, speed drops, or other findings suspicious of pump malfunction noted.   The driveline exit site was inspected, C/D/I.   All external components were inspected and showed no evidence of damage or malfunction, none replaced.   No changes to VAD settings made

## 2023-05-22 NOTE — PLAN OF CARE
"Blood pressure (!) 87/64, pulse 91, temperature 97.6  F (36.4  C), temperature source Oral, resp. rate 18, height 1.702 m (5' 7\"), weight 82.5 kg (181 lb 14.1 oz), SpO2 100 %.  VSS, on RA.  Maps WNL with doppler.  LVAD WNL, no alarms, dressing changed.  Denies any pain.  EGD/colonoscopy done this afternoon.  Started on 2gm Na diet, 2L FR.  Good appetite.  Adequate UOP.  Up with SBA and GB, walker for more distance. K+ replaced per protocol, rechecks WNL.  Started on daily PO replacement.  CBC q 8 hours, Hgb stable.  Call light within reach, continue with plan of care.                         "

## 2023-05-22 NOTE — OR NURSING
EGD under conscious sedation.  Biopsies obtained.      Colonoscopy under conscious sedation.  Polyps removed with cold snare and biopsy forceps.  2 Steris Assurance clips placed to control bleed.  Pt tolerated procedure.  Report called to Chelsie at 1355 hrs.  Pt transported to room on 2lpm 02 via NC

## 2023-05-22 NOTE — PROGRESS NOTES
Indication of Interrogation:  Bleeding, eval for hemodynamic fxn, flows/PI    Type of VAD:  Heartmate 3    Current Parameters:  Flow= 4.7-5.1 lpm, Speed= 5800 rpm, Power= 4.6 muhammad, PI (if applicable)= 2-4.5    Abnormal Alarm on History:  No    Abnormal Events/Parameters Notes:  Yes, frequent PI events.    Changes Made during Interrogation:  No

## 2023-05-23 NOTE — PLAN OF CARE
"BP (!) 87/64   Pulse 98   Temp 98.1  F (36.7  C) (Oral)   Resp 16   Ht 1.702 m (5' 7\")   Wt 85.5 kg (188 lb 7.9 oz)   SpO2 99%   BMI 29.52 kg/m      Shift: 1900 - 0730  VS: Stable on CPAP, afebrile  Cardiac: Telemetry monitoring in progress, rhythm has been normal sinus with a bundle branch block throughout shift. HR: 90s-100s, doppler MAPs have been 74-81. LVAD numbers within parameters, no alarms this shift.  Neuro: AOx4  Labs: Na: 133, BUN and creatinine are elevated, Hgb: 7.7, Hematocrit: 25.6; K protocol ordered, no replacement needed.  Respiratory: Pt denies dyspnea, no cough noted. Lung sounds are clear throughout with diminished bases bilaterally.  Pain/Nausea/PRN: Pt denies pain and nausea. PRN ambien given x1 for sleep.  Diet: 2 gram sodium diet  LDA: R PICC ()  GI/: Voiding into bedside urinal without issue, LBM: 5/22  Skin: No new findings this shift  Mobility: SBA  Plan: Continue to monitor electrolyte and hgb levels. Pt weight is increased significantly today, possible diuresis today depending on provider discretion.    Will give report to oncoming nurse. Pt left in stable condition, care relinquished at this time.       "

## 2023-05-23 NOTE — PROVIDER NOTIFICATION
Time of notification: 6:52 AM  Provider notified: [facility] (#[id number])  Patient status:  Author called [facility] provider to notify them that lab reported a critical INR value of >10.  Orders received: No new orders at this time.

## 2023-05-23 NOTE — PHARMACY-ANTICOAGULATION SERVICE
Clinical Pharmacy - Warfarin Dosing Consult     Pharmacy has been consulted to manage this patient s warfarin therapy.  Indication: LVAD/RVAD  Therapy Goal: Other - see comments (1.7-2.3)  Warfarin Prior to Admission: Yes  Warfarin PTA Regimen: 4mg Sun, 2mg ROW  Drug interactions: PTA amiodarone, aspirin  Dose Comments: Missed doses on 5/19, 5/20, 5/22, will give higher (Sunday dose) today.    INR   Date Value Ref Range Status   05/23/2023 1.36 (H) 0.85 - 1.15 Final   05/23/2023 >10.00 (HH) 0.85 - 1.15 Final     Factor 10 Chromogenic   Date Value Ref Range Status   04/23/2023 42 (L) 70 - 130 % Final       Recommend warfarin 4 mg today.  Pharmacy will monitor Eliseo Tanner daily and order warfarin doses to achieve specified goal.      Please contact pharmacy as soon as possible if the warfarin needs to be held for a procedure or if the warfarin goals change.      Herminia Meraz, PharmD, BCPS

## 2023-05-23 NOTE — PROGRESS NOTES
[facility]   Cardiology II Service / Advanced Heart Failure  Daily Progress Note      Patient: Eliseo Tanner  MRN: [id number]  Admission Date: 5/19/2023  Hospital Day # 4    Assessment and Plan: Eliseo Tanner is a 67 year old male with chronic systolic heart failure secondary to NICM (Stage D, NYHA Class IIIA) s/p HM 3 (2/18/2020), moderate CAD, HTN, ABHINAV on CPAP, DM2, CKD Stage III, ANA. 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 He was admitted on 5/20/23 for acute on chronic anemia associated with recurrent falls, lightheadedness, and hypotension.    Today's Plan:  - Increase torsemide back to  mg TID, will give bumex 5 mg IV x1 today as well (due for this outpatient as well)  - Increase kcl back to PTA 80 meq BID  - q8h Hgb  - Monitor for/document signs of active bleeding. Transfuse to keep Hgb > 7.   - Resume warfarin today, INR goal 1.7-2.3  - Change IV protonix to PO    # Acute on chronic anemia  # Chronic BELA  Hgb 6.7 on admit from baseline 9-10, had been drifting as an outpatient. No signs of overt bleeding. Hypotensive on admit (MAP 59) and has positional lightheadedness. Lactate 2.1 on admit. Unclear source for worsened anemia. Does have multiple lacerations/hematomas on arms from falls which could be contributing. No evidence of acute GI bleed given lack of hematochezia/melena or diarrhea, however, he does have known BELA and hasn't been scoped per review of our records. No evidence of RP bleed on exam. Hemolysis in setting of LVAD, infection, etc is also less likely. Patient with EGD and Colonoscopy on 2/22, multiple inflammatory polyps were identified, a number were removed and biopsied, one appeared to be a possible bleeding source and after removal site was clipped x3.  - Appreciate GI  consult  - q8h hgb  - Transfused 1 PRBC on 5/20 for Hgb 6.7. Goal Hgb > 7. Type and Screen expires 5/23  - Resume coumadin at low dose tonight  - Continue holding ASA  - IV PPI BID changing to PO PPI  - Iron labs: iron 26, TSAT 10, defer IV iron in setting of transfusion 5/20. - Consider outpatient iron supplementation (PO vs IV)  - Needs repeat EGD and colonoscopy in 1-2 months, GI will arrange  - No further scope inpatient unless having ongoing bleeding    # Falls, generalized fatigue  Pt with 2 weeks of generalized fatigue, positional lightheadedness associated with frequent falls. Mildly hypotensive on admission. Symptoms likely 2/2 orthostasis in setting of acute on chronic anemia.  - CT head without intracranial bleed  - Treating anemia as above  - Antihypertensive management as below  - PT/OT     # Acute on chronic systolic heart failure/HFrEF secondary to NICM  # Acute on chronic RV failure  # s/p HM3 LVAD  Stage D. NYHA Class IIIB.  Echo 4/28/23 septum normal, AV opening with each systole, and LVIDd 5.4 cm. RHC during last admit at 5800 rpm 4/28/23 mRA-12, mPCWP-12, JOSE Co-7, JOSE CI-3.6-note with drop in LVAD speed no significant improvement in RA with increase in wedge, weight 178 lb that day. Patient was diuresed with IV diuretics, didn't get back to EDW at time of discharge because pt wanted to go home early.  Last discharge on 5/3 weight 188 lb.   -Fluid status: hypervolemic.  -Diuresis: Resume PTA torsemide 100 mg TID and IV bumex three times weekly in setting of hypotension. Holding hydrochlorothiazide 100 mg daily.  -RV support: None. Attempted cilostazol previously but discontinued due to epistaxis.   -ACEi/ARB/ARNi: contraindicated due to renal dysfunction  -BB: contraindicated due to RV failure  -Aldosterone antagonist: contraindicated due to renal dysfunction   -SGLT2i: Hold PTA jardiance in setting of hypotension  -Afterload reduction: Hold hydralazine 50 mg tid, imdur 120 mg daily, and  amlodipine 10 mg daily in setting of hypotension  -SCD prophylaxis: ICD  -LDH trends: 300s, stable   -Anticoagulation: holding warfarin (see above), INR goal 1.7-2.3 due to epistaxis  -Antiplatelet: holding ASA 81 mg daily (see above)     # Tropinemia, improved   Troponin 155 on admit, increased from recent baseline. No chest pain or anginal equivalents. EKG without acute ST/T changes. Most likely demand ischemia in setting of acute on chronic anemia and hypotension.  - 155->148  - No further trending indicated     # BERNARDA on CKD Stage III, stable  # Diuretic induced hypokalemia  BERNARDA during recent admission, Cr was 2.59 at discharge and admitted at 2.34. Kidney injury likely multifactorial but primary  at this time is likely pre-renal from hypotension related to acute on chronic anemia.  - Cr 1.58, trend BMP  - K >4, Mg >2, on protocol     # Chronic hyponatremia  Na fluctuates, has been in 127-132 range for past month. Admitted at 127. Unlikely a  of pt's fatigue/falls given chronicity.  - Na 133, trend BMP daily     # Chronic LVAD drive line infection, MSSA and PSA.   Follows with LVAD ID, had recently been switched from cipro and daptomycin to cefepime on 5/16 d/t culture growing quinolone resistant PSA and no MSSA. Infection appears to be well controlled - he is afebrile on admit with normal WBC, and driveline site is without drainage, erythema, or pain on admission.   - Continue IV cefepime (renally dosed 1g q24h)-started 5/11. Dapto stopped 5/11; driveline cx neg MSSA  - Consider ID consult if inpatient issues arise  - Outpatient follow-up with LVAD ID (virtual mid-June)     # ?Recently diagnosed cirrhosis  Mildly elevated LFTs, stable. Had evaluation including liver biopsy during last admission which showed cirrhosis - likely multifactorial (remote EtOH misuse, NAFLD, chronic congestive hepatopathy from RV failure). No evidence of decompensation.  - CT A/P (5/20) with stable small volume ascites and  "mild mesenteric/retroperitoneal edema/cholelithiasis   - Needs outpatient follow-up with hepatology     # Hypothyroidism.   - Levothyroxine to 100 mcg daily (increased 5/2), repeat TSH 8.09/T4 0.97 on 5/21  - Repeat TSH ~6/15/23     Chronic Medical Conditions:   BPH. Continue PTA meds.   Gout. Continue Allopurinol.    DM Type II, controlled.   Mood Disorder. Continue Paxil.   ABHINAV: CPAP        Diet: Fluid restriction 2000 ML FLUID, Clears  DVT Prophylaxis: Pneumatic Compression Devices, holding warfarin   Guevara Catheter: Not present  Cardiac Monitoring: Telemetry   Code Status: Full Code    ================================================================    Subjective/24-Hr Events:   Last 24 hr care team notes reviewed. No acute events overnight.  Continues to deny and signs of bleeding. No melena, BRBR, epistaxis, hematuria. Denies N/V and abdominal pain. Feeling hungry. No lightheadedness, syncope, or near syncope.     ROS:  4 point ROS including respiratory, CV, GI and  (other than that noted in the HPI) is negative.     Medications: Reviewed in EPIC.     Physical Exam:   BP (!) 87/64   Pulse 101   Temp 98  F (36.7  C) (Oral)   Resp 16   Ht 1.702 m (5' 7\")   Wt 85.5 kg (188 lb 7.9 oz)   SpO2 97%   BMI 29.52 kg/m      GENERAL: Appears comfortable, in no distress.  HEENT: Eye symmetrical, no discharge or icterus bilaterally. Mucous membranes moist and without lesions.  NECK: Supple, JVD lower 1/3 of neck at 45 degrees   CV: + LVAD hum   RESPIRATORY: Respirations regular, even, and unlabored. Lungs CTA throughout.    GI: Soft and non distended with normoactive bowel sounds present in all quadrants. No tenderness, rebound, guarding.   EXTREMITIES: No peripheral edema. All extremities are warm and well perfused.  NEUROLOGIC: Alert and interteracting. No focal deficits.   MUSCULOSKELETAL: No joint swelling or tenderness.   SKIN: No jaundice. No rashes or lesions.     Labs:  [facility]  Recent Labs   Lab 05/23/23  [id number] " 05/22/23  1749 05/22/23  1539 05/22/23  1056 05/22/23  0619 05/21/23  1904 05/21/23  0558 05/20/23  1511 05/20/23  0857 05/20/23  0039 05/20/23  0030   * 133*  --   --  135* 131* 132*   < > 130*  --  127*   POTASSIUM 3.9 3.9 3.6 4.3 2.7* 3.6 4.2   < > 2.6*  2.6*  --  2.7*   CHLORIDE 99 98  --   --  97* 95* 96*   < > 87*  --  84*   CO2 24 25  --   --  27 23 25   < > 27  --  25   ANIONGAP 10 10  --   --  11 13 11   < > 16*  --  18*   * 176*  --   --  137* 199* 104*   < > 143*  --  193*   BUN 48.0* 47.8*  --   --  57.3* 71.3* 89.2*   < > 110.0*  --  108.0*   CR 1.58* 1.45*  --   --  1.45* 1.59* 1.79*   < > 2.24*  --  2.34*   GFRESTIMATED 47* 52*  --   --  52* 47* 41*   < > 31*  --  29*   CALOS 7.4* 8.0*  --   --  8.1* 8.2* 8.5*   < > 8.3*  --  8.3*   MAG 1.8  --   --   --  2.3  --  2.4*  --  2.6*   < >  --    PROTTOTAL  --   --   --   --   --   --   --   --   --   --  6.5   ALBUMIN  --   --   --   --   --   --   --   --   --   --  3.2*   BILITOTAL  --   --   --   --   --   --   --   --   --   --  0.6   ALKPHOS  --   --   --   --   --   --   --   --   --   --  121   AST  --   --   --   --   --   --   --   --   --   --  78*   ALT  --   --   --   --   --   --   --   --   --   --  53*    < > = values in this interval not displayed.       CBC  Recent Labs   Lab 05/23/23  1154 05/23/23  0456 05/22/23  1749 05/22/23  1056   WBC 8.1 7.1 6.8 5.9   RBC 2.83* 2.57* 2.83* 2.63*   HGB 8.5* 7.7* 8.4* 7.8*   HCT 28.3* 25.6* 27.8* 25.4*    100 98 97   MCH 30.0 30.0 29.7 29.7   MCHC 30.0* 30.1* 30.2* 30.7*   RDW 18.6* 18.8* 18.8* 18.7*    173 194 164       INR  Recent Labs   Lab 05/23/23  0705 05/23/23  0456 05/22/23  0619 05/21/23  0558   INR 1.36* >10.00* 1.32* 1.38*       Time/Communication  I personally spent a total of 35 minutes. Of that 15 minutes was counseling/coordination of patient's care. Plan of care discussed with patient. See my note above for details.    Patient discussed with Dr. Salmeron.         Mervat Decker PA-C  Advanced Heart Failure/Cardiology II Service  Pager [phone] [facility] [phone]

## 2023-05-23 NOTE — PLAN OF CARE
"Goal Outcome Evaluation:    BP (!) 87/64   Pulse 101   Temp 98.1  F (36.7  C) (Oral)   Resp 16   Ht 1.702 m (5' 7\")   Wt 85.5 kg (188 lb 7.9 oz)   SpO2 97%   BMI 29.52 kg/m      VS: Afebrile.  VSS.  Cardiac: NS with a bundle branch block. HR: 90s-100s,LVAD numbers within parameters, no alarms this shift.  LVAD dressings done.  Neuro: AOx4.  [place].  Respiratory: Lung sounds are clear throughout with diminished bases bilaterally.  Denies SOB.    Pain/Nausea/PRN: Pt denies pain and nausea.   Diet: 2 gram sodium diet  LDA: R PICC ()  GI/: Using urinal.  Skin: No new findings this shift  Mobility: SBA  Plan: IV bumex given.      "

## 2023-05-24 NOTE — PROGRESS NOTES
The patient's HeartMate LVAD was interrogated 5/24/2023  * Speed 5800 rpm   * Pulsatility index 4.0   * Power 4.6 Driver   * Flow 4.9 L/minute   Fluid status: hypervolemic   Alarms were reviewed, and notable for rare pi events.   The driveline exit site was inspected, c/d/i.   All external components were inspected and showed no evidence of damage or malfunction, none replaced.   No changes to VAD settings made

## 2023-05-24 NOTE — PROGRESS NOTES
[facility]   Cardiology II Service / Advanced Heart Failure  Daily Progress Note      Patient: Eliseo Tanner  MRN: [id number]  Admission Date: 5/19/2023  Hospital Day # 5    Assessment and Plan: Eliseo Tanner is a 67 year old male with chronic systolic heart failure secondary to NICM (Stage D, NYHA Class IIIA) s/p HM 3 (2/18/2020), moderate CAD, HTN, ABHINAV on CPAP, DM2, CKD Stage III, ANA. 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 He was admitted on 5/20/23 for acute on chronic anemia associated with recurrent falls, lightheadedness, and hypotension.    Today's Plan:  - Start bumex gtt with IV push of 5  - Consider afternoon diuril  - Ctoninue kcl back to PTA 80 meq BID  - q12h Hgb, Transfuse to keep Hgb > 7.   - q12h BMP  - Will discuss biopsy with GI    # Acute on chronic anemia  # Chronic BELA  Hgb 6.7 on admit from baseline 9-10, had been drifting as an outpatient. No signs of overt bleeding. Hypotensive on admit (MAP 59) and has positional lightheadedness. Lactate 2.1 on admit. Unclear source for worsened anemia. Does have multiple lacerations/hematomas on arms from falls which could be contributing. No evidence of acute GI bleed given lack of hematochezia/melena or diarrhea, however, he does have known BELA and hasn't been scoped per review of our records. No evidence of RP bleed on exam. Hemolysis in setting of LVAD, infection, etc is also less likely. Patient with EGD and Colonoscopy on 2/22, multiple inflammatory polyps were identified, a number were removed and biopsied, one appeared to be a possible bleeding source and after removal site was clipped x3.  - Appreciate GI consult  - q12h hgb  - Transfused 1 PRBC on 5/20 for Hgb 6.7. Goal Hgb > 7. Type and Screen expires 5/23  - Resume coumadin at low  dose tonight  - Continue holding ASA  - IV PPI BID changing to PO PPI  - Iron labs: iron 26, TSAT 10, defer IV iron in setting of transfusion 5/20. - Consider outpatient iron supplementation (PO vs IV)  - Needs repeat EGD in 1-2 months, repeat colonoscopy in 3 years, GI will arrange  - No further scope inpatient unless having ongoing bleeding    # Falls, generalized fatigue  Pt with 2 weeks of generalized fatigue, positional lightheadedness associated with frequent falls. Mildly hypotensive on admission. Symptoms likely 2/2 orthostasis in setting of acute on chronic anemia.  - CT head without intracranial bleed  - Treating anemia as above  - Antihypertensive management as below  - PT/OT     # Acute on chronic systolic heart failure/HFrEF secondary to NICM  # Acute on chronic RV failure  # s/p HM3 LVAD  Stage D. NYHA Class IIIB.  Echo 4/28/23 septum normal, AV opening with each systole, and LVIDd 5.4 cm. RHC during last admit at 5800 rpm 4/28/23 mRA-12, mPCWP-12, JOSE Co-7, JOSE CI-3.6-note with drop in LVAD speed no significant improvement in RA with increase in wedge, weight 178 lb that day. Patient was diuresed with IV diuretics, didn't get back to EDW at time of discharge because pt wanted to go home early.  Last discharge on 5/3 weight 188 lb.   -Fluid status: hypervolemic.  -Diuresis:Start bumex gtt with IV push of 5, holding PTA torsemide 100 mg TID and IV bumex three times weekly in setting of hypotension and PTA hydrochlorothiazide 100 mg daily.  -RV support: None. Attempted cilostazol previously but discontinued due to epistaxis.   -ACEi/ARB/ARNi: contraindicated due to renal dysfunction  -BB: contraindicated due to RV failure  -Aldosterone antagonist: contraindicated due to renal dysfunction   -SGLT2i: Hold PTA jardiance in setting of hypotension  -Afterload reduction: Holding PTA hydralazine 50 mg tid, imdur 120 mg daily, and amlodipine 10 mg daily in setting of hypotension  -SCD prophylaxis: ICD  -LDH  trends: 300s, stable   -Anticoagulation: holding warfarin (see above), INR goal 1.7-2.3 due to epistaxis  -Antiplatelet: holding ASA 81 mg daily, do not plan to resume (see above)     # Tropinemia, improved   Troponin 155 on admit, increased from recent baseline. No chest pain or anginal equivalents. EKG without acute ST/T changes. Most likely demand ischemia in setting of acute on chronic anemia and hypotension.  - 155->148  - No further trending indicated     # BERNARDA on CKD Stage III, stable  # Diuretic induced hypokalemia  BERNARDA during recent admission, Cr was 2.59 at discharge and admitted at 2.34. Kidney injury likely multifactorial but primary  at this time is likely pre-renal from hypotension related to acute on chronic anemia.  - Cr 1.58, trend BMP  - K >4, Mg >2, on protocol     # Chronic hyponatremia  Na fluctuates, has been in 127-132 range for past month. Admitted at 127. Unlikely a  of pt's fatigue/falls given chronicity.  - Na 132, trend BMP daily     # Chronic LVAD drive line infection, MSSA and PSA.   Follows with LVAD ID, had recently been switched from cipro and daptomycin to cefepime on 5/16 d/t culture growing quinolone resistant PSA and no MSSA. Infection appears to be well controlled - he is afebrile on admit with normal WBC, and driveline site is without drainage, erythema, or pain on admission.   - Continue IV cefepime (renally dosed 1g q24h)-started 5/11. Dapto stopped 5/11; driveline cx neg MSSA  - Consider ID consult if inpatient issues arise  - Outpatient follow-up with LVAD ID (virtual mid-June)     # ?Recently diagnosed cirrhosis  Mildly elevated LFTs, stable. Had evaluation including liver biopsy during last admission which showed cirrhosis - likely multifactorial (remote EtOH misuse, NAFLD, chronic congestive hepatopathy from RV failure). No evidence of decompensation.  - CT A/P (5/20) with stable small volume ascites and mild mesenteric/retroperitoneal edema/cholelithiasis  "  - Needs outpatient follow-up with hepatology     # Hypothyroidism.   - Levothyroxine to 100 mcg daily (increased 5/2), repeat TSH 8.09/T4 0.97 on 5/21  - Repeat TSH ~6/15/23    # Tremor, longstanding, intermittent, happens both at rest and with activity but not consistently. No asterixis. No intention tremor on exam. No resint tremor on exam.   - Would f/up without patient neurology (needs referal on discharge to [facility] neurology)     Chronic Medical Conditions:   BPH. Continue PTA meds.   Gout. Continue Allopurinol.    DM Type II, controlled.   Mood Disorder. Continue Paxil.   ABHINAV: CPAP        Diet: Fluid restriction 2000 ML FLUID, Clears  DVT Prophylaxis: Pneumatic Compression Devices, holding warfarin   Guevara Catheter: Not present  Cardiac Monitoring: Telemetry   Code Status: Full Code    ================================================================    Subjective/24-Hr Events:   Last 24 hr care team notes reviewed. No acute events overnight.  Continues to deny and signs of bleeding. No melena, BRBR, epistaxis, hematuria. Denies N/V and abdominal pain. Feeling hungry. No lightheadedness, syncope, or near syncope. He notes that he was able to walk with therapy in the hallway and do some stairs. He felt good wit this. He denies swelling in his legs or stomach. He complains of a b/l tremor that is very longstanding.    ROS:  4 point ROS including respiratory, CV, GI and  (other than that noted in the HPI) is negative.     Medications: Reviewed in EPIC.     Physical Exam:   BP (!) 87/64   Pulse 101   Temp 97.8  F (36.6  C) (Oral)   Resp 18   Ht 1.702 m (5' 7\")   Wt 87.6 kg (193 lb 3.2 oz)   SpO2 95%   BMI 30.26 kg/m      GENERAL: Appears comfortable, in no distress.  HEENT: Eye symmetrical, no discharge or icterus bilaterally. Mucous membranes moist and without lesions.  NECK: Supple, JVD upper third of neck at 90 degrees  CV: + LVAD hum   RESPIRATORY: Respirations regular, even, and " unlabored. Lungs CTA throughout.    GI: Soft and non distended with normoactive bowel sounds present in all quadrants. No tenderness, rebound, guarding.   EXTREMITIES: No peripheral edema. All extremities are warm and well perfused.  NEUROLOGIC: Alert and interteracting. No focal deficits. No asterixis. No tremor.   MUSCULOSKELETAL: No joint swelling or tenderness.   SKIN: No jaundice. No rashes or lesions.     Labs:  CMP  Recent Labs   Lab 05/24/23  0516 05/23/23  1754 05/23/23  0456 05/22/23  1749 05/22/23  1056 05/22/23  0619 05/21/23  1904 05/21/23  0558 05/20/23  0039 05/20/23  0030   * 133* 133* 133*  --  135*   < > 132*   < > 127*   POTASSIUM 4.5 4.1 3.9 3.9   < > 2.7*   < > 4.2   < > 2.7*   CHLORIDE 100 99 99 98  --  97*   < > 96*   < > 84*   CO2 22 24 24 25  --  27   < > 25   < > 25   ANIONGAP 10 10 10 10  --  11   < > 11   < > 18*   * 138* 152* 176*  --  137*   < > 104*   < > 193*   BUN 44.1* 45.0* 48.0* 47.8*  --  57.3*   < > 89.2*   < > 108.0*   CR 1.58* 1.59* 1.58* 1.45*  --  1.45*   < > 1.79*   < > 2.34*   GFRESTIMATED 47* 47* 47* 52*  --  52*   < > 41*   < > 29*   CALOS 7.4* 7.2* 7.4* 8.0*  --  8.1*   < > 8.5*   < > 8.3*   MAG 2.6*  --  1.8  --   --  2.3  --  2.4*   < >  --    PROTTOTAL  --   --   --   --   --   --   --   --   --  6.5   ALBUMIN  --   --   --   --   --   --   --   --   --  3.2*   BILITOTAL  --   --   --   --   --   --   --   --   --  0.6   ALKPHOS  --   --   --   --   --   --   --   --   --  121   AST  --   --   --   --   --   --   --   --   --  78*   ALT  --   --   --   --   --   --   --   --   --  53*    < > = values in this interval not displayed.       CBC  Recent Labs   Lab 05/24/23  0516 05/23/23  1754 05/23/23  1154 05/23/23  0456   WBC 7.1 7.3 8.1 7.1   RBC 2.80* 2.62* 2.83* 2.57*   HGB 8.2* 7.9* 8.5* 7.7*   HCT 27.8* 26.0* 28.3* 25.6*   MCV 99 99 100 100   MCH 29.3 30.2 30.0 30.0   MCHC 29.5* 30.4* 30.0* 30.1*   RDW 18.4* 18.7* 18.6* 18.8*    173 300 173        INR  Recent Labs   Lab 05/24/23  0516 05/23/23  0705 05/23/23  0456 05/22/23  0619   INR 1.32* 1.36* >10.00* 1.32*       Time/Communication  I personally spent a total of 35 minutes. Of that 15 minutes was counseling/coordination of patient's care. Plan of care discussed with patient. See my note above for details.    Patient discussed with Dr. Salmeron.        Mervat Decker PA-C  Advanced Heart Failure/Cardiology II Service  Pager [phone] [facility] [phone]

## 2023-05-24 NOTE — PROGRESS NOTES
The patient's HeartMate LVAD was interrogated 5/23/2023  * Speed 5800 rpm   * Pulsatility index 4.1   * Power 4.7 Driver   * Flow 4.9 L/minute   Fluid status: hypervolemic   Alarms were reviewed, and notable for rare pi events.   The driveline exit site was inspected, c/d/i.   All external components were inspected and showed no evidence of damage or malfunction, none replaced.   No changes to VAD settings made

## 2023-05-24 NOTE — PLAN OF CARE
"BP (!) 87/64   Pulse 88   Temp 97.9  F (36.6  C) (Oral)   Resp 17   Ht 1.702 m (5' 7\")   Wt 87.3 kg (192 lb 7.4 oz)   SpO2 99%   BMI 30.14 kg/m      Shift: 1900 - 0730  VS: Stable on RA, afebrile  Cardiac: Telemetry monitoring in progress, rhythm has been normal sinus with a BBB. HR: 80s-100s, doppler MAPs have been 75-83. LVAD numbers have been within parameters, no alarms this shift.  Neuro: AOx4  Labs: AM labs have been collected, results are pending at this time.  Respiratory: Pt denies dyspnea, no cough noted. Lung sounds are clear throughout with diminished bases bilaterally.  Pain/Nausea/PRN: Pt denies pain and nausea. PRN ambien given x1 for sleep.  Diet: 2 gram Na diet, 2 L fluid restriction  LDA: R PICC (SL)  GI/: Voiding adequate amounts into bedside urinal, LBM: 5/23  Skin: No new findings this shift  Mobility: SBA  Plan: Continue to monitor electrolyte and hemoglobin levels, possible discharge in the next few days.    Will give report to oncoming nurse. Pt left in stable condition, care relinquished at this time.       "

## 2023-05-24 NOTE — PLAN OF CARE
"D: 67 year old male with chronic systolic heart failure secondary to NICM (Stage D, NYHA Class IIIA) s/p HM 3 (2/18/2020), moderate CAD, HTN, ABHINAV on CPAP, DM2, CKD Stage III, ANA. 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 He was admitted on 5/20/23 for acute on chronic anemia associated with recurrent falls, lightheadedness, and hypotension.       I: Monitored and assessed patient status; nursing cares provided.    A:   Today's Highlights/Changes:    New order, Bumex 2mg/hr and one time Bumex inj 5mg.     Held torsemide med     CBC lab draw change to q12hr     BP (!) 87/64   Pulse 101   Temp 97.8  F (36.6  C) (Oral)   Resp 18   Ht 1.702 m (5' 7\")   Wt 87.6 kg (193 lb 3.2 oz)   SpO2 95%   BMI 30.26 kg/m      Neuro: Alert & oriented x 4. Calm, cooperative, and pleasant. Afebrile and VSS.   Cardiac: LVAD monitor, numbers within parameters, dressings to be done at 12.   Respiratory: Room air, denies SOB and chest pain   GI/: Urinal at bedside and no BM during shift.   Diet/Appetite: 2 gram sodium and 2 L fluid restriction   Skin: No new deficits noted, generalized bruising.   Pain: Chronic shoulder and knee pain, topical cream applied.   Labs/Lytes: K 4.5, replaced.   LDA's: R single lumen PICC, infusing.   Activity: Assist x 1, up to bathroom and urinal at bedside.     P: Continue follow POC and notify Cards 2 team of any new changes.     Chitra Rowland RN          "

## 2023-05-24 NOTE — PROGRESS NOTES
Time of notification: 12:58 AM  Provider notified: CARDS 2 provider (#[id number])  Patient status:   Author was receiving alarms that pt's heart rate was tachycardic in the 140s-150s, author went to pt's room to assess and saw that his PI was 1.9. Author woke pt up and to assess further, pt is asymptomatic. After waking pt, PI increased to 2.0-2.1 and HR lowered to the 90s.  Author called provider to notify them, and discussed pt's current LVAD numbers. Provider instructed author to continue to assess and notify them if the situation occurred again.

## 2023-05-25 NOTE — CONSULTS
Day team addendum    Patient seen and evaluated. Now fully oriented and much improved after stopping cefepime. EEG unremarkable, will discontinue.     Neurology to sign off. Please do not hesitate to contact with questions.     Dicussed with staff Dr. Roque         [facility]  Neurology Consultation    Patient Name:  Eliseo Tanner  MRN:  [id number]    :  1955  Date of Service:  May 25, 2023  Primary care provider:  Jay Steele      Neurology consultation service was asked to see Eliseo Tanner by Dr. Salmeron to evaluate AMS.    Chief Complaint:  Encephalopathy     History of Present Illness:   Eliseo Tanner is a 67 year old male with history of chronic systolic heart failure, nonischemic cardiomyopathy, coronary artery disease, hypertension, ABHINAV, DM 2, CKD 3, HM 3 on 2020 course C/B retrosternal hematoma, pulmonary hemorrhage, RV failure, VT, atrial fibrillation with AVR s/p DCCV on , who was readmitted after a long hospitalization on  with recurrent falls.    Per chart review, patient had prolonged hospitalization for acute heart failure and discharged home on 5/3 although he was not felt to be medically optimized at the time.  Since the time of discharge, he has suffered from significant generalized fatigue, orthostatic symptoms, and recurrent falls.  He has been compliant with his medications, including cefepime which he has been on for chronic pseudomonal driveline infection since . On exam here, he is alert and oriented to date, follows complex commands, but cannot do arithmetic and subjectively says he has been confused since December. I also note a postural tremor of the head and hands that the patient says has been present for months.     In discussion with the primary team, patient is having changes in personality and possible word findings difficulty. Changes have been most prominent since  evening. However this has been difficult to  reproduce on their examination, as he does have a waxing and waning course. Head CT was completed today, and was unremarkable.  In review of his labs, he had chronic hyponatremia which appears to be improving.  He had a creatinine of 2.3 on admission which is improved to 1.8.  BUN is persistently elevated to 44.  CRP is elevated to 14, TSH noted to be 11 today.  He continues to have a chronic anemia with hemoglobin of 8.  We do not note any leukocytosis.  Preliminary infectious work-up including UA, chest x-ray and blood cultures all negative.  Notable medications include amiodarone, recent cefepime use, as well as tamsulosin.  He has both zolpidem, as well as quetiapine ordered nightly as needed, although he does not seem to be receiving these routinely.    ROS  A comprehensive ROS was performed and pertinent findings were included in HPI.     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)     MSSA     Legionella pneumonia (H)      LVAD (left ventricular assist device) present (H)      MSSA bacteremia 11/2020     Past Surgical History:   Procedure Laterality Date     ANESTHESIA CARDIOVERSION N/A 02/28/2020    Procedure: ANESTHESIA, FOR CARDIOVERSION;  Surgeon: GENERIC ANESTHESIA PROVIDER;  Location: [facility] OR     COLONOSCOPY N/A 5/22/2023    Procedure: COLONOSCOPY, WITH POLYPECTOMY AND BIOPSY;  Surgeon: Myles Mota DO;  Location: [facility] GI     CV CARDIOMEMS WITH RIGHT HEART CATH N/A 09/20/2022    Procedure: Pulmonary Arterial Pressure Sensor Placement CPT Codes to be cleared by financial securing for this implant. [id number] and ;  Surgeon: Dalton Baeza MD;  Location: [facility] HEART CARDIAC CATH LAB     CV CENTRAL VENOUS CATHETER PLACEMENT N/A 02/13/2020    Procedure: Central Venous Catheter Placement;  Surgeon:  Chente Moss MD;  Location:  HEART CARDIAC CATH LAB     CV INTRA AORTIC BALLOON N/A 02/07/2020    Procedure: Intra-Aortic Balloon Pump Insertion;  Surgeon: Jose Baldwin MD;  Location:  HEART CARDIAC CATH LAB     CV INTRA AORTIC BALLOON N/A 02/13/2020    Procedure: Intra-Aortic Balloon Pump Insertion;  Surgeon: Chente Moss MD;  Location:  HEART CARDIAC CATH LAB     CV RIGHT HEART CATH MEASUREMENTS RECORDED N/A 09/21/2020    Procedure: CV RIGHT HEART CATH;  Surgeon: Dalton Baeza MD;  Location:  HEART CARDIAC CATH LAB     CV RIGHT HEART CATH MEASUREMENTS RECORDED N/A 01/07/2021    Procedure: Right Heart Cath;  Surgeon: Chun Ball MD;  Location:  HEART CARDIAC CATH LAB     CV RIGHT HEART CATH MEASUREMENTS RECORDED N/A 02/10/2022    Procedure: CV RIGHT HEART CATH;  Surgeon: Dalton Baeza MD;  Location:  HEART CARDIAC CATH LAB     CV RIGHT HEART CATH MEASUREMENTS RECORDED N/A 09/20/2022    Procedure: Right Heart Catheterization [[id number]];  Surgeon: Dalton Baeza MD;  Location:  HEART CARDIAC CATH LAB     CV RIGHT HEART CATH MEASUREMENTS RECORDED N/A 12/12/2022    Procedure: Right Heart Cath;  Surgeon: Chente Moss MD;  Location:  HEART CARDIAC CATH LAB     CV RIGHT HEART CATH MEASUREMENTS RECORDED N/A 4/28/2023    Procedure: Right Heart Catheterization;  Surgeon: Aaliyah Fischer MD;  Location:  HEART CARDIAC CATH LAB     CV SWAN [place]UCIANA PROCEDURE N/A 02/13/2020    Procedure: [place] Procedure;  Surgeon: Chente Moss MD;  Location:  HEART CARDIAC CATH LAB     EP ICD Bilateral 03/16/2020    Procedure: EP ICD;  Surgeon: Dali Day MD;  Location:  HEART CARDIAC CATH LAB     ESOPHAGOSCOPY, GASTROSCOPY, DUODENOSCOPY (EGD), COMBINED N/A 5/22/2023    Procedure: ESOPHAGOGASTRODUODENOSCOPY, WITH BIOPSY;  Surgeon: Myles Mota DO;  Location:  GI     INCISION AND DRAINAGE CHEST WASHOUT, COMBINED  N/A 2022    Procedure: INCISION AND DRAINAGE OF DRIVELINE;  Surgeon: Mac Jaramillo MD;  Location: [facility] OR     INSERT VENTRICULAR ASSIST DEVICE LEFT (HEARTMATE II) N/A 2020    Procedure: INSERTION, LEFT VENTRICULAR ASSIST DEVICE (HEARTMATE III);  Surgeon: Mac Jaramillo MD;  Location: [facility] OR     IR CVC TUNNEL PLACEMENT > 5 YRS OF AGE  2021     IR CVC TUNNEL REMOVAL RIGHT  2021     IR TRANSCATHETER BIOPSY  2023     MIDLINE INSERTION - DOUBLE LUMEN Left 12/15/2022    left basilic 5 fr dl midline 20 cm     THORACOSCOPY Right 2020    Procedure: RIGHT VIDEO-ASSISTED THORASCOPIC SURGERY, EVACUATION OF HEMOTHORAX, PLACEMENT OF CHEST TUBES;  Surgeon: William Gan MD;  Location: [facility] OR       Medications   I have personally reviewed the patient's medication list.     Allergies  I have personally reviewed the patient's allergy list.     Social History  Social History     Socioeconomic History     Marital status:      Spouse name: Not on file     Number of children: Not on file     Years of education: Not on file     Highest education level: Not on file   Occupational History     Not on file   Tobacco Use     Smoking status: Former     Types: Cigarettes     Quit date: 1994     Years since quittin.1     Smokeless tobacco: Never   Vaping Use     Vaping status: Never Used   Substance and Sexual Activity     Alcohol use: Not Currently     Drug use: Never     Sexual activity: Not on file   Other Topics Concern     Not on file   Social History Narrative     Not on file     Social Determinants of Health     Financial Resource Strain: Not on file   Food Insecurity: Not on file   Transportation Needs: Not on file   Physical Activity: Not on file   Stress: Not on file   Social Connections: Not on file   Intimate Partner Violence: Not on file   Housing Stability: Not on file     Family History    No family history on file.    Physical Examination   Vitals: BP 99/82 (BP  "Location: Left arm)   Pulse 104   Temp 98.1  F (36.7  C) (Oral)   Resp 25   Ht 1.702 m (5' 7\")   Wt 86.9 kg (191 lb 9.3 oz)   SpO2 97%   BMI 30.01 kg/m    General: Lying in bed, NAD  Head: NC/AT  Eyes: no icterus, op pink and moist  Cardiac: RRR. Extremities warm, no edema.   Respiratory: non-labored on RA  GI: S/NT/ND  Skin: No rash or lesion on exposed skin  Psych: Mood pleasant, affect congruent  Neuro:  Mental status: Awake, alert, attentive, oriented to self, time, place, and circumstance. Language is fluent and coherent with intact comprehension of complex commands, naming and repetition. Spells world forward. Not able to do backwards. Cannot say how many quarters in a dollar. Knows the season.   Cranial nerves: VFF, PERRL, conjugate gaze, EOMI, facial sensation intact, face symmetric, shoulder shrug strong, tongue/uvula midline, no dysarthria.   Motor: 5/5 throughout. Noted to have high freq postural tremor in hands and head.   Reflexes: Toes down-going   Sensory: Intact to light touch in all 4 extremities   Coordination: FNF intact in bilateral UE, noted tremor   Gait: JEFF    Investigations   I have personally reviewed pertinent labs, tests, and radiological imaging. Discussion of notable findings is included under impression.     Was patient transferred from outside hospital?   No    Impression  Eliseo Tanner is a 67 year old male with history of chronic systolic heart failure, nonischemic cardiomyopathy, coronary artery disease, hypertension, ABHINAV, DM 2, CKD 3, HM 3 on 2/18/2020 course C/B retrosternal hematoma, pulmonary hemorrhage, RV failure, VT, atrial fibrillation with AVR s/p DCCV on 2/28, who was readmitted after a long hospitalization on 5/19 with recurrent falls, found to have AMS starting around 5/24.    Patient's encephalopathy has a broad differential including medications, metabolic causes, vascular, autoimmune, systemic infection, CNS infection, among others.  The patient's history of " cardiomyopathy does place him at risk of embolic encephalopathy head CT reassuring but cannot order MRI to confirm this.  Metabolic causes are also possible, patient has had persistently elevated BUN, including chronic hyponatremia, and TSH elevation to 11.  Events have been improving however without a change in his clinical status.  Autoimmune causes do seem unlikely.  Systemic infection also less likely given work-up, however patient has had a recent history of resistant driveline infection, appreciate ID involvement.  CNS infection also less likely based on examination.  Medications do seem to be a likely culprit, as the development of the patient's encephalopathy and generalized weakness at home does seem to correlate somewhat to the initiation of cefepime in the setting of renal disease.  Agree with discontinuation of cefepime. Cefepime can be a cause of altered mental status and tremor as well. Also concerned about seizure in setting of cefepime use.    Recommendations  -Agree with discontinuing Cefepime  -vEEG now    Thank you for involving Neurology in the care of Eliseo Tanner.  Please do not hesitate to call with questions/concerns (consult pager [phone]).      Patient was seen and discussed with Dr. Roque.    Anali Dill MD

## 2023-05-25 NOTE — PROGRESS NOTES
Care Management Follow Up    Length of Stay (days): 6    Expected Discharge Date: 05/29/2023     Concerns to be Addressed:  Supportive visit,m Discharge Planning   Patient plan of care discussed at interdisciplinary rounds: Yes    Anticipated Discharge Disposition: home vs TCU      Anticipated Discharge Services:    Anticipated Discharge DME:      Patient/family educated on Medicare website which has current facility and service quality ratings:    Education Provided on the Discharge Plan:  No-altered mental status  Patient/Family in Agreement with the Plan:  Unable to discuss with pt due to altered mental status.     Referrals Placed by CM/SW:  None   Private pay costs discussed: Not applicable    Additional Information:  LVAD  met w/ pt for supportive visit. Pt was alert but frustrated. Pt unable to articulate needs and fixated on his phone and appeared confused as to how to use it. This is abnormal presentation for pt, from previous visits with him. Writer informed Cards 2 Provider of writer's observations.     Addendum:  1620: Spoke to pt's wife, Chapis, at length, this afternoon. She is very concerned about pt's medical status. She reports she noted mental status changes yesterday late afternoon. She reports she has been trying to call and text w/ pt all day and pt has not responded, which is not normal behavior for pt. He texts and calls her independently. Writer helped facilitate a video phone call, using pt's phone, with his wife. Pt had difficulty articulating his thoughts and having a conversation with his wife and his granddaughter, whom he adores. Writer contacted pt's wife after video call. She is more concerned after their video phone call. Wife planning to be here at 11ish tomorrow to get medical updates.     We discussed discharge planning briefly and wife would be in support of TCU placement, as this is what is currently recommended.     Writer will plan to meet w/ pt's wife tomorrow.        Cintia Perkins, Northern Light A.R. Gould HospitalSW

## 2023-05-25 NOTE — CONSULTS
General Infectious Disease Service Consultation - Yellow Team  Patient:  Eliseo Tanner, Date of birth 1955, Medical record number [id number]  Date of Admission: 5/19/2023  Date of Visit:  5/25/2023  Requesting Provider: Destiny Salmeron         Assessment and Recommendations:   Problem List:  AMS, concern for cefepime neurotoxicity  -On the night of 5/24/23-5/25/23, he became altered. Occasionally seems to have word finding difficulty. He also had mood and behavioral changes with more confusion.  -Repeat blood cx were obtained. UA was negative at admission and repeat is pending. Repeat CT head was negative. CXR was overall unremarkable. His amonia level was normal.    LVAD History:  Current LVAD model: History of NICM s/p HM III  Date current LVAD placed: 2/18/20  Previous LVAD devices: none  Other prosthetic devices/materials:  ICD 3/16/20, Mems 8/2022  Primary cardiologist: Dr. Cisneros  Primary ID provider: Magy     History of bacteremias (dates and organisms):   --History of pre-op Proteus and Enterococcus bacteremia  --MSSA bacteremia secondary to MSSA driveline infection (11/2020).  Remains on cefadroxil or cephalexin for suppression.     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 Increased drainage and fluid collection in 12/2022 managed with I&D and course of daptomycin. s/p I&D of fluid collection on 12/11/22; Cx +MSSA (tetra-R). 12/7 outside culture with both MRSA and MSSA. Attempted to transition to Bactrim suppression in 3/2023 but developed BERNARDA and was started on  Daptomycin from the 12/2022 admission. Culture 3/2/23 with MSSA and Pseudomonas x2 (pan-S) and MSSA x2 strains (both R: tetracycline; 1 with high daptomycin DONTA). Ciprofloxacin was added due to increased drainage. 3/24 cultures with Pseudomonas x3 strains (all FQ resistant). Although cipro  resistant pseudomonas he clinically improved so cipro treatment was continued. CT (4/23/23) with soft tissue thickening along driveline but no abscess. 5/11/23 driveline exit site grew 2 strains of PsA that are resistance to fluoroquinolones so was placed on cefepime and continued on dapto. Repeat cultures on 5/11/23 grew PsA only so on 5/16/23 stopped dapto. He was continued on cefepime monotherapy as it also covers MSSA and no recent cultures grew MRSA. Per nursing he has more drainage from the exit site and with concern for cefepime induced CNS toxicity, now we will switch it to  Meropenem. Will also get repeat cultures from LVAD exit site.      History of other pertinent infections:   --Hx severe Legionella pneumonia (serogroup 1L) c/b cardiogenic shock, renal failure requiring CRRT, and resp failure requiring intubation, s/p azithromycin     History of driveline area irritation and current mitigation strategies:  local wound care center using vashe nzt 5.5 wound solution - soaking a hydrofera blue sponge and covering with mepilex. Changing dressing daily.   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 He was on cefepime/dapto until 5/16/23 when he was switched to cefepime monotherapy as he failed to grow MRSA in several recent cultures and consistently grew PsA. Also recently diagnosed with stage IV cirrhosis via liver biopsy. he was admitted on 5/19 for anemia likely from a gastric inflammatory polyps seen on noted on EGD. Overnight he was confused and ID was consulted for possible cefepime toxicity.    Based on his renal fxn and CrCl, he is actually being under dosed with cefepime. However, the pt's symptoms could be due to cefepime neurotoxicity given his stuttering and word findings  difficulties over the last 12hrs. It would be helpful to get Cefepime levels but this is a send out test and could take several days to come back. In addition per nursing his LVAD exit site is draining purulent material. Given that he continues to have drainage form the LAVD exit site, will recommend switching him from cefepime to meropenem (renal dosing) as his PsA could be cefepime resistant.  Tono will also cover the prior MSSA as well. Will get repeat cultures from the drive line. His last CT abd on 5/19 showed no new abnormality along the drive line. Will also get a UA with reflex culture. He denies any dysuria but he is a poor historian at this time.  Given his acute confusion, neuro consult is also recommend to r/o other etiologies.    Recommendations:  -Can change from cefepime to Meropenem (renal dosing) as there is a concern for cefepime CNS toxicity and per nursing more purulent drainage from LVAD exit site rasing concern for cefepime resistance.   -Please culture the LVAD exit site for bacterial stain/culture. Please take a picture at the next wound dressing chage.   -f/u UA with reflex culture. F/u pending blood cx.   -will also recommend a neuro consult.   -will consider workup for phage therapy      Estimated Creatinine Clearance: 42.4 mL/min (A) (based on SCr of 1.73 mg/dL (H)).    Recommendations discussed with primary team.    Thank you for this consult. The General ID team will continue to follow this patient. Please feel free to call with any questions.     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 He was on cefepime/dapto until 5/16/23 when he was switched to cefepime monotherapy as he failed to grow MRSA in  several recent cultures and consistently grew PsA. Also recently diagnosed with stage IV cirrhosis via liver biopsy. He presented on 5/19/23 to ED after recurrent falls at home and anemia.     He was recently admitted and discharged on 5/3/23. Since being home, he had generalized fatigue and lightheadedness with standing and as a result has recurrent falls. He never lost LOC. At home he contied to have weight gain. No LVAD issues or low flow alarms. His Hb at admission was 7.2 baseline 9-10s. He had a EGD and colonoscopy on 5/22/23 . EGD showed Three gastric inflammatory polyps seen on exam which was biopsied. These were without sign of active bleeding, but could be a contributing cause of patient anemia. colonoscopy showed polyps which were biopsied as well. CT head without intracranial bleed.    Overnight, he became altered. Occasionally seems to have word finding difficulty that resolves per nursing notes. He also had mood and behavioral changes. Repeat blood cx were obtained. UA was negative at admission. Repeat CT head was negative. CXR was overall unremarkable. His amonia level was normal. Given that he is on cefepime, ID was consulted for recommendations. Unable to obtain a social history given his confusion.            Review of Systems:     CONSTITUTIONAL:  No fevers or chills  INTEGUMENTARY/SKIN: NEGATIVE for worrisome rashes, moles or lesions  EYES: Negative for icterus, vision changes or irritation  ENT/MOUTH:  Negative for oral lesions and sore throat  RESPIRATORY:  Negative for cough and dyspnea  CARDIOVASCULAR:  Negative for chest pain, palpitations and  shortness of breath  GASTROINTESTINAL:  Negative for abdominal pain, nausea, vomiting, diarrhea and constipation  GENITOURINARY:  Negative for dysuria, hematuria, frequency and urgency  MUSCULOSKELETAL: Negative for joint pain, swelling, motion restriction, negative for musculoskeletal pain  NEURO:  Negative for headache, altered mental status,  numbness or weakness  PSYCHIATRIC: Negative for changes in mood or affect  HEMATOLOGIC/LYMPHATIC: negative for lymphadenopathy or bleeding  ALLERGIC/IMMUNOLOGIC: Negative for allergic reaction   ENDOCRINE: Negative for temperature intolerance, skin/hair changes       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)     MSSA     Legionella pneumonia (H)      LVAD (left ventricular assist device) present (H)      MSSA bacteremia 2020         Allergies:      Allergies   Allergen Reactions     Heparin      HIT screen positive 20, reflex DAVINA negative; however heme recommended treating as if positive  HIT screen negative 20     Chlorhexidine Rash     Oxycodone Other (See Comments) and Itching           Recent Antimicrobials::   Cefepime -     Family History:   Reviewed and noncontributory.   No family history on file.         Social History:     Social History     Socioeconomic History     Marital status:      Spouse name: Not on file     Number of children: Not on file     Years of education: Not on file     Highest education level: Not on file   Occupational History     Not on file   Tobacco Use     Smoking status: Former     Types: Cigarettes     Quit date: 1994     Years since quittin.1     Smokeless tobacco: Never   Vaping Use     Vaping status: Never Used   Substance and Sexual Activity     Alcohol use: Not Currently     Drug use: Never     Sexual activity: Not on file   Other Topics Concern     Not on file   Social History Narrative     Not on file     Social Determinants of Health     Financial Resource Strain: Not on file   Food Insecurity: Not on file   Transportation Needs: Not on file   Physical Activity: Not on file   Stress: Not on file   Social  "Connections: Not on file   Intimate Partner Violence: Not on file   Housing Stability: Not on file                Physical Exam:   BP 99/82 (BP Location: Left arm)   Pulse 102   Temp 98.7  F (37.1  C) (Oral)   Resp 21   Ht 1.702 m (5' 7\")   Wt 86.9 kg (191 lb 9.3 oz)   SpO2 95%   BMI 30.01 kg/m         Exam:  GENERAL:  Well-developed, well-nourished, not in acute distress.   HEAD: Normocephalic and atraumatic  ENT:  No hearing impairment, no ear pain or exudate. nose and mouth without ulcers or lesions  EYES:  Eyes grossly normal to inspection, PERRL and conjunctivae and sclerae normal   NECK:  Supple, no adenopathy, no asymmetry  LUNGS:  Clear to auscultation - no rales, rhonchi or wheezes  CARDIOVASCULAR:  VAD sounds.   ABDOMEN:  Soft, nontender, no hepatosplenomegaly, no masses and bowel sounds normal. Clean bandage over the LVAD exit site.   EXT: Extremities warm and without edema.  MS: No gross musculoskeletal defects noted, minimal edema  SKIN:  No acute rashes or suspicious lesions  NEUROLOGIC:  Grossly nonfocal. Speaks one word sentences. Has difficulty word finding  PSYCHIATRIC: Mood stable, mentation appears normal, affect normal  HEMATOLOGIC/LYMPHATIC: No lymphadenopathy or bleeding           Laboratory Data:     Creatinine   Date Value Ref Range Status   05/25/2023 1.73 (H) 0.67 - 1.17 mg/dL Final   05/25/2023 1.78 (H) 0.67 - 1.17 mg/dL Final   05/24/2023 1.58 (H) 0.67 - 1.17 mg/dL Final   05/24/2023 1.58 (H) 0.67 - 1.17 mg/dL Final   05/23/2023 1.59 (H) 0.67 - 1.17 mg/dL Final   04/09/2021 1.6 mg/dL Final   03/19/2021 2.1 mg/dL Final   03/17/2021 2.00 (H) 0.66 - 1.25 mg/dL Final   03/16/2021 2.18 (H) 0.66 - 1.25 mg/dL Final   03/15/2021 2.31 (H) 0.66 - 1.25 mg/dL Final     WBC   Date Value Ref Range Status   03/19/2021 7.9 10^9/L Final   03/17/2021 6.6 4.0 - 11.0 10e9/L Final   03/16/2021 6.5 4.0 - 11.0 10e9/L Final   03/15/2021 6.3 4.0 - 11.0 10e9/L Final   03/14/2021 5.9 4.0 - 11.0 10e9/L Final "     WBC Count   Date Value Ref Range Status   05/25/2023 9.0 4.0 - 11.0 10e3/uL Final   05/25/2023 8.6 4.0 - 11.0 10e3/uL Final   05/24/2023 9.0 4.0 - 11.0 10e3/uL Final   05/24/2023 7.1 4.0 - 11.0 10e3/uL Final   05/23/2023 7.3 4.0 - 11.0 10e3/uL Final     Hemoglobin   Date Value Ref Range Status   05/25/2023 7.3 (L) 13.3 - 17.7 g/dL Final   03/19/2021 10.1 (A) 13.3 - 17.7 g/dL Final     Platelet Count   Date Value Ref Range Status   05/25/2023 191 150 - 450 10e3/uL Final   03/19/2021 351 150 - 450 10^9/L Final   03/17/2021 268 150 - 450 10e9/L Final     Lab Results   Component Value Date     (L) 05/25/2023    BUN 44.3 (H) 05/25/2023    CO2 23 05/25/2023     CRP Inflammation   Date Value Ref Range Status   08/17/2021 4.9 0.0 - 8.0 mg/L Final   02/16/2021 270.0 (H) 0.0 - 8.0 mg/L Final   02/15/2021 270.0 (H) 0.0 - 8.0 mg/L Final   02/11/2021 8.6 0.0 - 10.0 mg/L Final   12/17/2020 8.0 0.0 - 8.0 mg/L Final   12/14/2020 6.1 0.0 - 10.0 mg/L Final           Pertinent Recent Microbiology Data:   No results for input(s): CULT, SDES in the last 168 hours.         Imaging:     Recent Results (from the past 48 hour(s))   CT Head w/o Contrast    Narrative    CT HEAD W/O CONTRAST 5/25/2023 10:40 AM    History: altered mental status/confusion in LVAd patient, please r/o  bleed     Comparison: 5/20/2023    Technique: Using multidetector thin collimation helical acquisition  technique, axial, coronal and sagittal CT images from the skull base  to the vertex were obtained without intravenous contrast.   (topogram) image(s) also obtained and reviewed.    Findings: There is no intracranial hemorrhage, mass effect, or midline  shift. Gray/white matter differentiation in both cerebral hemispheres  is preserved. Basal ganglia calcifications similar to prior.  Ventricles are proportionate to the cerebral sulci. The basal cisterns  are clear.    The bony calvaria and the bones of the skull base are normal. The  visualized  portions of the paranasal sinuses and mastoid air cells are  clear.       Impression    Impression:  No acute intracranial pathology.     I have personally reviewed the examination and initial interpretation  and I agree with the findings.    KATHY SEAMAN MD         SYSTEM ID:  [id number]   XR Chest Port 1 View    Narrative    Portable chest    INDICATION: Acute medical syndrome rule out aspiration    COMPARISON: 5/20/2023    FINDINGS: Heart size upper normal. Median sternotomy, left subclavian  transvenous approach implantable cardiac defibrillator and LVAD again  present. No new consolidative opacities. Mild central perihilar patchy  densities unchanged. High riding bilateral humeral heads may indicate  supraspinatus tendon degeneration at both shoulders.      Impression    IMPRESSION: No significant changes with perhaps minimal central  edema/atelectasis without definite aspiration. No consolidation. LVAD.  Implantable cardiac defibrillator.    AIME GAY MD         SYSTEM ID:  [id number]

## 2023-05-25 NOTE — PROGRESS NOTES
CLINICAL NUTRITION SERVICES    Reviewed nutrition risk factors due to LOS. Pt is tolerating a 2 gm Na, 2L fluid restricted diet, eating well per nursing documentation, consistently 100% of meals per I/O. Weight hx also reviewed; variable d/t cardiac history/fluid status. No nutrition issues identified at this time. RD will follow via rounds at this time, unless consulted.    Aneesh Goncalves RDN, LD, CNSC  [address]B work-room RD phone: [phone]   [address]B/Obs RD pager: [phone]    Weekend/Holiday RD pager [phone]

## 2023-05-25 NOTE — PROGRESS NOTES
[facility]   Cardiology II Service / Advanced Heart Failure  Daily Progress Note      Patient: Eliseo Tanner  MRN: [id number]  Admission Date: 5/19/2023  Hospital Day # 6    Assessment and Plan: Eliseo Tanner is a 67 year old male with chronic systolic heart failure secondary to NICM (Stage D, NYHA Class IIIA) s/p HM 3 (2/18/2020), moderate CAD, HTN, ABHINAV on CPAP, DM2, CKD Stage III, ANA. 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 He was admitted on 5/20/23 for acute on chronic anemia associated with recurrent falls, lightheadedness, and hypotension.    Today's Plan:  - STAT head CT  - Broad AMS/infectious work- up: LA, LDH, Blood cultures, CXR, urine culture, ammonia  - Continue bumex gtt  - Consider afternoon diuril  - Continue PTA kcl 80 meq BID  - q12h Hgb, Transfuse to keep Hgb > 7.   - q12h BMP    # Altered Mental Status. Patient with behavorial and mood changes overnight 5/24-5/25, not sleeping well, fixated on ambiguous tasks, etc. He does have more of a stutter, but no word-finding. Cranial nerves 2-12 intact. Symmetric. 5/5 upper and lower extremity strength. No asterixis. No pronator drift. Broad differential, but stoke appears unlikely given his reassuring neurologic exam. Considering metabolic encephalopathy, delerium, etc.   - STAT head CT  - Broad AMS/infectious work- up: LA, LDH, Blood cultures, CXR, urine culture, ammonia  - Delerium precautions  - Seroquel PRN QHS    # Acute on chronic anemia  # Chronic BELA  Hgb 6.7 on admit from baseline 9-10, had been drifting as an outpatient. No signs of overt bleeding. Hypotensive on admit (MAP 59) and has positional lightheadedness. Lactate 2.1 on admit. Unclear source for worsened anemia. Does have multiple lacerations/hematomas on arms from falls which  could be contributing. No evidence of acute GI bleed given lack of hematochezia/melena or diarrhea, however, he does have known BELA and hasn't been scoped per review of our records. No evidence of RP bleed on exam. Hemolysis in setting of LVAD, infection, etc is also less likely. Patient with EGD and Colonoscopy on 2/22, multiple inflammatory polyps were identified, a number were removed and biopsied, one appeared to be a possible bleeding source and after removal site was clipped x3.  - Appreciate GI consult  - q12h hgb  - Goal Hgb > 7, last transfusion 5/20  - Resuming coumadin, no bridge  - Continue holding ASA  - BID po protonix 40 mg BID  - Iron labs: iron 26, TSAT 10, defer IV iron in setting of transfusion 5/20. - Consider outpatient iron supplementation (PO vs IV)  - Needs repeat EGD in 1-2 months, repeat colonoscopy in 3 years, GI will arrange  - No further scope inpatient unless having ongoing bleeding     # Acute on chronic systolic heart failure/HFrEF secondary to NICM  # Acute on chronic RV failure  # s/p HM3 LVAD  Stage D. NYHA Class IIIB.  Echo 4/28/23 septum normal, AV opening with each systole, and LVIDd 5.4 cm. RHC during last admit at 5800 rpm 4/28/23 mRA-12, mPCWP-12, JOSE Co-7, JOSE CI-3.6-note with drop in LVAD speed no significant improvement in RA with increase in wedge, weight 178 lb that day. Patient was diuresed with IV diuretics, didn't get back to EDW at time of discharge because pt wanted to go home early.  Last discharge on 5/3 weight 188 lb.   -Fluid status: hypervolemic.  -Diuresis:Start bumex gtt with IV push of 5, holding PTA torsemide 100 mg TID and IV bumex three times weekly in setting of hypotension and PTA hydrochlorothiazide 100 mg daily.  -RV support: None. Attempted cilostazol previously but discontinued due to epistaxis.   -ACEi/ARB/ARNi: contraindicated due to renal dysfunction  -BB: contraindicated due to RV failure  -Aldosterone antagonist: contraindicated due to renal  dysfunction   -SGLT2i: Hold PTA jardiance in setting of hypotension  -Afterload reduction: Holding PTA hydralazine 50 mg tid, imdur 120 mg daily, and amlodipine 10 mg daily in setting of hypotension  -SCD prophylaxis: ICD  -LDH trends: 300s, stable   -Anticoagulation: holding warfarin (see above), INR goal 1.7-2.3 due to epistaxis  -Antiplatelet: holding ASA 81 mg daily, do not plan to resume (see above)     # Tropinemia, improved   Troponin 155 on admit, increased from recent baseline. No chest pain or anginal equivalents. EKG without acute ST/T changes. Most likely demand ischemia in setting of acute on chronic anemia and hypotension.  - 155->148  - No further trending indicated    # Falls, generalized fatigue  Pt with 2 weeks of generalized fatigue, positional lightheadedness associated with frequent falls. Mildly hypotensive on admission. Symptoms likely 2/2 orthostasis in setting of acute on chronic anemia.  - CT head without intracranial bleed  - Treating anemia as above  - Antihypertensive management as below  - PT/OT     # BERNARDA on CKD Stage III, stable  # Diuretic induced hypokalemia  BERNARDA during recent admission, Cr was 2.59 at discharge and admitted at 2.34. Kidney injury likely multifactorial but primary  at this time is likely pre-renal from hypotension related to acute on chronic anemia.  - Cr 1.58, trend BMP  - K >4, Mg >2, on protocol     # Chronic hyponatremia  Na fluctuates, has been in 127-132 range for past month. Admitted at 127. Unlikely a  of pt's fatigue/falls given chronicity.  - Na 135, trend BMP daily     # Chronic LVAD drive line infection, MSSA and PSA.   Follows with LVAD ID, had recently been switched from cipro and daptomycin to cefepime on 5/16 d/t culture growing quinolone resistant PSA and no MSSA. Infection appears to be well controlled - he is afebrile on admit with normal WBC, and driveline site is without drainage, erythema, or pain on admission.   - Continue IV  cefepime (renally dosed 1g q24h)-started 5/11. Dapto stopped 5/11; driveline cx neg MSSA  - Consider ID consult if inpatient issues arise  - Outpatient follow-up with LVAD ID (virtual mid-June)     # ?Recently diagnosed cirrhosis  Mildly elevated LFTs, stable. Had evaluation including liver biopsy during last admission which showed cirrhosis - likely multifactorial (remote EtOH misuse, NAFLD, chronic congestive hepatopathy from RV failure). No evidence of decompensation.  - CT A/P (5/20) with stable small volume ascites and mild mesenteric/retroperitoneal edema/cholelithiasis   - Needs outpatient follow-up with hepatology     # Hypothyroidism.   - Levothyroxine to 100 mcg daily (increased 5/2), repeat TSH 8.09/T4 0.97 on 5/21  - Repeat TSH ~6/15/23    # Tremor, longstanding, intermittent, happens both at rest and with activity but not consistently. No asterixis. No intention tremor on exam. No resint tremor on exam.   - Would f/up without patient neurology (needs referal on [facility])     Chronic Medical Conditions:   BPH. Continue PTA meds.   Gout. Continue Allopurinol.    DM Type II, controlled.   Mood Disorder. Continue Paxil.   ABHINAV: CPAP        Diet: Fluid restriction 2000 ML FLUID, Clears  DVT Prophylaxis: Pneumatic Compression Devices, holding warfarin   Guevara Catheter: Not present  Cardiac Monitoring: Telemetry   Code Status: Full Code    ================================================================    Subjective/24-Hr Events:   Last 24 hr care team notes reviewed. Overnight per report did not sleep well, low-level agitation. Personality is off from baseline. Today he denies headache, lightheadedness, dizziness, shortness of breath, swelling ,bleeding, and driveline concerns. Wife notes that he is acting different and is fixated about entering a code on his phone.     ROS:  4 point ROS including respiratory, CV, GI and  (other than that noted in the HPI) is negative.  "    Medications: Reviewed in EPIC.     Physical Exam:   BP 99/82 (BP Location: Left arm)   Pulse 102   Temp 97.8  F (36.6  C) (Oral)   Resp 20   Ht 1.702 m (5' 7\")   Wt 86.9 kg (191 lb 9.3 oz)   SpO2 98%   BMI 30.01 kg/m      GENERAL: Appears comfortable, in no distress.  HEENT: Eye symmetrical, no discharge or icterus bilaterally. Mucous membranes moist and without lesions.  NECK: Supple, JVD upper third of neck at 90 degrees  CV: + LVAD hum   RESPIRATORY: Respirations regular, even, and unlabored. Lungs CTA throughout.    GI: Soft and non distended with normoactive bowel sounds present in all quadrants. No tenderness, rebound, guarding.   EXTREMITIES: No peripheral edema. All extremities are warm and well perfused.  NEUROLOGIC: Alert and oriented to self and place, but not to month or year. He does have more of a stutter, but no word-finding. Cranial nerves 2-12 intact. Symmetric. 5/5 upper and lower extremity strength. No asterixis. No pronator drift.    MUSCULOSKELETAL: No joint swelling or tenderness.   SKIN: No jaundice. No rashes or lesions.     Labs:  CMP  Recent Labs   Lab 05/25/23  0437 05/24/23  1616 05/24/23  0516 05/23/23  1754 05/23/23  0456 05/22/23  1056 05/22/23  0619 05/20/23  0039 05/20/23  0030   * 133* 132* 133* 133*   < > 135*   < > 127*   POTASSIUM 3.1* 5.5* 4.5 4.1 3.9   < > 2.7*   < > 2.7*   CHLORIDE 99 101 100 99 99   < > 97*   < > 84*   CO2 24 21* 22 24 24   < > 27   < > 25   ANIONGAP 12 11 10 10 10   < > 11   < > 18*   * 157* 182* 138* 152*   < > 137*   < > 193*   BUN 43.4* 42.4* 44.1* 45.0* 48.0*   < > 57.3*   < > 108.0*   CR 1.78* 1.58* 1.58* 1.59* 1.58*   < > 1.45*   < > 2.34*   GFRESTIMATED 41* 47* 47* 47* 47*   < > 52*   < > 29*   CALOS 7.8* 7.6* 7.4* 7.2* 7.4*   < > 8.1*   < > 8.3*   MAG 1.6*  --  2.6*  --  1.8  --  2.3   < >  --    PROTTOTAL  --   --   --   --   --   --   --   --  6.5   ALBUMIN  --   --   --   --   --   --   --   --  3.2*   BILITOTAL  --   --   -- "   --   --   --   --   --  0.6   ALKPHOS  --   --   --   --   --   --   --   --  121   AST  --   --   --   --   --   --   --   --  78*   ALT  --   --   --   --   --   --   --   --  53*    < > = values in this interval not displayed.       CBC  Recent Labs   Lab 05/25/23  0437 05/24/23  1616 05/24/23  0516 05/23/23  1754   WBC 8.6 9.0 7.1 7.3   RBC 2.85* 2.92* 2.80* 2.62*   HGB 8.4* 8.6* 8.2* 7.9*   HCT 27.6* 29.1* 27.8* 26.0*   MCV 97 100 99 99   MCH 29.5 29.5 29.3 30.2   MCHC 30.4* 29.6* 29.5* 30.4*   RDW 18.0* 18.1* 18.4* 18.7*    168 180 173       INR  Recent Labs   Lab 05/25/23  0437 05/24/23  0516 05/23/23  0705 05/23/23  0456   INR 1.37* 1.32* 1.36* >10.00*       Time/Communication  I personally spent a total of 35 minutes. Of that 15 minutes was counseling/coordination of patient's care. Plan of care discussed with patient. See my note above for details.    Patient discussed with Dr. Salmeron.        Mervat Decker PA-C  Advanced Heart Failure/Cardiology II Service  Pager [phone] [facility] [phone]

## 2023-05-25 NOTE — PLAN OF CARE
"[id number]    Neuro: A&Ox4. Occasionally seems to have word finding difficulty that resolves. Otherwise, neuros intact.    - Focused on phone/call light for most of the night and when asked what he's trying to do/if he needs help he says its \"difficult to explain\", unable to redirect, pt did not sleep overnight.  Cardiac: ST w/BBB, HR 100s. LVAD HM3, MAPs 70-80s. Afebrile.   Respiratory: Sating >93% on RA.  GI/: Adequate urine output. BM X1  Diet/appetite: Tolerating low sodium, 2L FR diet. Eating well.  Activity:  Assist of 1, not OOB this shift.  Pain: At acceptable level on current regimen.   Skin: No new deficits noted.  LDA's: R SL PICC infusing Bumex, CDI.    Plan: Continue with POC. Notify primary team with changes.   "

## 2023-05-26 NOTE — PROGRESS NOTES
Preliminary EEG report:    First hour of video EEG was reviewed.  No well-organized posterior dominant rhythm was appreciated.  Diffuse theta delta slowing was present.  No epileptiform discharges or electrographic seizures were recorded.  Findings are consistent with moderate diffuse nonspecific encephalopathy.      Shoshana Dangelo MD  Epilepsy staff

## 2023-05-26 NOTE — PLAN OF CARE
[id number]     Neuro: A&Ox4, able to answer orientation questions, conversational. X1 overnight where pt initially stated wrong month but corrected himself. Otherwise neuros intact. Slept for a few hours.  Cardiac: ST w/BBB, HR 100s. LVAD HM3, MAPs 68-80s. Afebrile.              Respiratory: Sating >95% on RA. Home CPAP worn overnight.  GI/: Adequate urine output, pt incontinent several times. BM X1  Diet/appetite: Tolerating low sodium, 2L FR diet. Low appetite overnight.  Activity:  Assist of 1, up to bedside commode.  Pain: At acceptable level on current regimen.   Skin: No new deficits noted.  LDA's: R SL PICC infusing Bumex, CDI.      Plan: Continue with POC. Notify primary team with changes.

## 2023-05-26 NOTE — PROGRESS NOTES
Care Management Follow Up    Length of Stay (days): 7    Expected Discharge Date: 05/29/2023     Concerns to be Addressed: Discharge Planning, adjustment to illness      Patient plan of care discussed at interdisciplinary rounds: Yes    Anticipated Discharge Disposition:  Anticipate discharge home      Anticipated Discharge Services:  Resume outpatient IV Abx and IV diuresis at     [facility]   Phone: [phone]   Fax: [phone]     Anticipated Discharge DME:  None     Patient/family educated on Medicare website which has current facility and service quality ratings:  N/a   Education Provided on the Discharge Plan:  Yes  Patient/Family in Agreement with the Plan:  Would not be in agreement w/ TCU placement     Referrals Placed by CM/SW:  [facility] following   Private pay costs discussed: Not applicable    Additional Information:  Social Work and Cards 2 provider met w/ pt and his wife, Chapis, at bedside, and dtr, Mayra, on phone. Provided with medical update. Pt doing better today, more engaged and interactive. Pt's wife also thinks pt is doing much better today and closer to his baseline.     Reviewed discharge plan and likely won't happen before Tuesday. Pt and wife want pt to return home; they are aware of current recommendation for TCU. Pt and wife are aware of change in abx and likely now needing it more frequently then what he was doing prior to admission; they are ok with this as pt lives 5 blocks from the medical center. Writer updated [facility], where pt has been getting his outpatient IV abx. Facility carries the meropenem so that will not be a problem. Writer inquired if they needed anything faxed today and they do not. Will need updated orders on day of discharge.       Cintia Perkins, VERÓNICASW

## 2023-05-26 NOTE — PROGRESS NOTES
General Infectious Disease Service Progress Note- Yellow Team  Patient:  Eliseo Tanner, Date of birth 1955, Medical record number [id number]  Date of Admission: 5/19/2023  Date of Visit:  5/25/2023  Requesting Provider: Destiny Salmeron         Assessment and Recommendations:   Problem List:  AMS, Questionable for cefepime neurotoxicity-resolved  -On the night of 5/24/23-5/25/23, he became altered. Occasionally seemed to have word finding difficulty. He also had mood and behavioral changes with more confusion.  -Repeat blood cx were obtained. UA was negative at admission and repeat is pending. Repeat CT head was negative. CXR was overall unremarkable. His amonia level was normal. EEG was negative.   -he came back to his normal neurological baseline late last night/this AM. With his rapid improvement, its not clear if cefepime was contributing to his CNS issues.     Continued drainage from the LVAD exit site- cx obtained.   -the drive line had some clear drainage on exam. From the wife it sounds like its not much worse than usual.     LVAD History:  Current LVAD model: History of NICM s/p HM III  Date current LVAD placed: 2/18/20  Previous LVAD devices: none  Other prosthetic devices/materials:  ICD 3/16/20, Mems 8/2022  Primary cardiologist: Dr. Cisneros  Primary ID provider: Magy     History of bacteremias (dates and organisms):   --History of pre-op Proteus and Enterococcus bacteremia  --MSSA bacteremia secondary to MSSA driveline infection (11/2020).  Remains on cefadroxil or cephalexin for suppression.     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 Increased drainage and fluid collection in 12/2022 managed with I&D and course of daptomycin. s/p I&D of fluid collection on 12/11/22; Cx +MSSA (tetra-R). 12/7 outside culture with both MRSA and MSSA. Attempted to  transition to Bactrim suppression in 3/2023 but developed BERNARDA and was started on  Daptomycin from the 12/2022 admission. Culture 3/2/23 with MSSA and Pseudomonas x2 (pan-S) and MSSA x2 strains (both R: tetracycline; 1 with high daptomycin DONTA). Ciprofloxacin was added due to increased drainage. 3/24 cultures with Pseudomonas x3 strains (all FQ resistant). Although cipro resistant pseudomonas he clinically improved so cipro treatment was continued. CT (4/23/23) with soft tissue thickening along driveline but no abscess. 5/11/23 driveline exit site grew 2 strains of PsA that are resistance to fluoroquinolones so was placed on cefepime and continued on dapto. Repeat cultures on 5/11/23 grew PsA only so on 5/16/23 stopped dapto. He was continued on cefepime monotherapy as it also covers MSSA and no recent cultures grew MRSA. Per nursing he has more drainage from the exit site and with concern for cefepime induced CNS toxicity, he is now on  Meropenem. Today we obtained cultures from LVAD exit site.      History of other pertinent infections:   --Hx severe Legionella pneumonia (serogroup 1L) c/b cardiogenic shock, renal failure requiring CRRT, and resp failure requiring intubation, s/p azithromycin     History of driveline area irritation and current mitigation strategies:  local wound care center using vashe nzt 5.5 wound solution - soaking a hydrofera blue sponge and covering with mepilex. Changing dressing daily.   Current suppressive antibiotics: cefepime      Discussion:  67 year old LVAD hx of MSSA and PsA LVAD exit site infection on cefepime who was admitted for concern for GI bleed (EGD done and inflamed polyp possible source) 5/24-5/25 night he was confused and ID was consulted for possible cefepime toxicity.    The pt is now on chepe (cefepime stopped on 5/25/23) as there was a concern for cefepime neurotoxicity and continued drainage from the LVAD site (per nursing on 5/25/23). Continued drainage on cefepime  does raise concern for resistance. Its not clear to me if the drainage is actually worse than his baseline after talking to the wife. Today, he looks much better and is back to baseline neuro status. With such a rapid improvement of his neurological status, its unlikely that he had cefepime induced neurotoxicity and was probably experiencing delirium.     Other infectious work up with CXR, UA and blood Cx are unremarkable to date. Was able to obtain a cx from the LVAD exit site. If the pt were to grow PsA which is susceptible to cefepime/ceftaz, will consider deescalating from chepe to ceftazidime or cefepime. We can rechallenge with cefepime or switch to ceftazidime.  Ceftazidime has a lower risk of CNS neurotoxicity but has no MSSA coverage so he will need to be restarted on dapto. Pip/tazo is being avoided as it has high salt content and can make his heart failure worse.    Recommendations:  -continue  Meropenem (renal dosing)    -f/p pending cx of the LVAD exit site for bacterial stain/culture if there is drainage. Please take a picture at the each wound dressing chage.   - F/u pending blood cx.   -will consider workup for phage therapy as outpatient.     Recommendations discussed with primary team.    The General ID team will continue to follow this patient. Over the weekend, Dr. Wendy Escalona will be covering. Please page if there are any questions. I will be back on service next Tuesday on May 30 3023.       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 He was on cefepime/dapto until 5/16/23 when he was switched to cefepime monotherapy as he failed to grow MRSA in several recent cultures and  "consistently grew PsA. Also recently diagnosed with stage IV cirrhosis via liver biopsy. He presented on 5/19/23 to ED after recurrent falls at home and anemia.     He was recently admitted and discharged on 5/3/23. Since being home, he had generalized fatigue and lightheadedness with standing and as a result has recurrent falls. He never lost LOC. At home he contied to have weight gain. No LVAD issues or low flow alarms. His Hb at admission was 7.2 baseline 9-10s. He had a EGD and colonoscopy on 5/22/23 . EGD showed Three gastric inflammatory polyps seen on exam which was biopsied. These were without sign of active bleeding, but could be a contributing cause of patient anemia. colonoscopy showed polyps which were biopsied as well. CT head without intracranial bleed.    Overnight, he became altered. Occasionally seems to have word finding difficulty that resolves per nursing notes. He also had mood and behavioral changes. Repeat blood cx were obtained. UA was negative at admission. Repeat CT head was negative. CXR was overall unremarkable. His amonia level was normal. Given that he is on cefepime, ID was consulted for recommendations. Unable to obtain a social history given his confusion.         Interval history:     The had no acute events overnight. His EEG was unremarkable. A&Ox4, able to answer orientation questions and is back to his baseline. He allie any fevers, chills, sob, chest pain, abd pain, n/v/d at this time.          Physical Exam:   BP 99/82 (BP Location: Left arm)   Pulse 106   Temp 98.2  F (36.8  C) (Oral)   Resp 20   Ht 1.702 m (5' 7\")   Wt 85.9 kg (189 lb 6 oz)   SpO2 100%   BMI 29.66 kg/m         Exam:  GENERAL:  Well-developed, well-nourished, not in acute distress.   HEAD: Normocephalic and atraumatic  ENT:  No hearing impairment, no ear pain or exudate. nose and mouth without ulcers or lesions  EYES:  Eyes grossly normal to inspection, PERRL and conjunctivae and sclerae normal   NECK: "  Supple, no adenopathy, no asymmetry  LUNGS:  Clear to auscultation - no rales, rhonchi or wheezes  CARDIOVASCULAR:  VAD sounds.   ABDOMEN:  Soft, nontender, no hepatosplenomegaly, no masses and bowel sounds normal. Wound overall looking good. Some drainage is noted which was clear with yellow tint.    EXT: Extremities warm and without edema.  MS: No gross musculoskeletal defects noted, minimal edema  SKIN:  No acute rashes or suspicious lesions. PICC good position in right arm  NEUROLOGIC:  Grossly nonfocal.  PSYCHIATRIC: Mood stable, mentation appears normal, affect normal  HEMATOLOGIC/LYMPHATIC: No lymphadenopathy or bleeding           Laboratory Data:     Creatinine   Date Value Ref Range Status   05/26/2023 1.78 (H) 0.67 - 1.17 mg/dL Final   05/25/2023 1.78 (H) 0.67 - 1.17 mg/dL Final   05/25/2023 1.73 (H) 0.67 - 1.17 mg/dL Final   05/25/2023 1.78 (H) 0.67 - 1.17 mg/dL Final   05/24/2023 1.58 (H) 0.67 - 1.17 mg/dL Final   04/09/2021 1.6 mg/dL Final   03/19/2021 2.1 mg/dL Final   03/17/2021 2.00 (H) 0.66 - 1.25 mg/dL Final   03/16/2021 2.18 (H) 0.66 - 1.25 mg/dL Final   03/15/2021 2.31 (H) 0.66 - 1.25 mg/dL Final     WBC   Date Value Ref Range Status   03/19/2021 7.9 10^9/L Final   03/17/2021 6.6 4.0 - 11.0 10e9/L Final   03/16/2021 6.5 4.0 - 11.0 10e9/L Final   03/15/2021 6.3 4.0 - 11.0 10e9/L Final   03/14/2021 5.9 4.0 - 11.0 10e9/L Final     WBC Count   Date Value Ref Range Status   05/26/2023 10.0 4.0 - 11.0 10e3/uL Final   05/25/2023 10.0 4.0 - 11.0 10e3/uL Final   05/25/2023 9.0 4.0 - 11.0 10e3/uL Final   05/25/2023 8.6 4.0 - 11.0 10e3/uL Final   05/24/2023 9.0 4.0 - 11.0 10e3/uL Final     Hemoglobin   Date Value Ref Range Status   05/26/2023 8.3 (L) 13.3 - 17.7 g/dL Final   03/19/2021 10.1 (A) 13.3 - 17.7 g/dL Final     Platelet Count   Date Value Ref Range Status   05/26/2023 188 150 - 450 10e3/uL Final   03/19/2021 351 150 - 450 10^9/L Final   03/17/2021 268 150 - 450 10e9/L Final     Lab Results    Component Value Date     05/26/2023    BUN 41.3 (H) 05/26/2023    CO2 23 05/26/2023     CRP Inflammation   Date Value Ref Range Status   08/17/2021 4.9 0.0 - 8.0 mg/L Final   02/16/2021 270.0 (H) 0.0 - 8.0 mg/L Final   02/15/2021 270.0 (H) 0.0 - 8.0 mg/L Final   02/11/2021 8.6 0.0 - 10.0 mg/L Final   12/17/2020 8.0 0.0 - 8.0 mg/L Final   12/14/2020 6.1 0.0 - 10.0 mg/L Final           Pertinent Recent Microbiology Data:   No results for input(s): CULT, SDES in the last 168 hours.         Imaging:     Recent Results (from the past 48 hour(s))   CT Head w/o Contrast    Narrative    CT HEAD W/O CONTRAST 5/25/2023 10:40 AM    History: altered mental status/confusion in LVAd patient, please r/o  bleed     Comparison: 5/20/2023    Technique: Using multidetector thin collimation helical acquisition  technique, axial, coronal and sagittal CT images from the skull base  to the vertex were obtained without intravenous contrast.   (topogram) image(s) also obtained and reviewed.    Findings: There is no intracranial hemorrhage, mass effect, or midline  shift. Gray/white matter differentiation in both cerebral hemispheres  is preserved. Basal ganglia calcifications similar to prior.  Ventricles are proportionate to the cerebral sulci. The basal cisterns  are clear.    The bony calvaria and the bones of the skull base are normal. The  visualized portions of the paranasal sinuses and mastoid air cells are  clear.       Impression    Impression:  No acute intracranial pathology.     I have personally reviewed the examination and initial interpretation  and I agree with the findings.    KATHY SEAMAN MD         SYSTEM ID:  [id number]   XR Chest Port 1 View    Narrative    Portable chest    INDICATION: Acute medical syndrome rule out aspiration    COMPARISON: 5/20/2023    FINDINGS: Heart size upper normal. Median sternotomy, left subclavian  transvenous approach implantable cardiac defibrillator and LVAD again  present. No  new consolidative opacities. Mild central perihilar patchy  densities unchanged. High riding bilateral humeral heads may indicate  supraspinatus tendon degeneration at both shoulders.      Impression    IMPRESSION: No significant changes with perhaps minimal central  edema/atelectasis without definite aspiration. No consolidation. LVAD.  Implantable cardiac defibrillator.    AIME GAY MD         SYSTEM ID:  [id number]

## 2023-05-26 NOTE — PLAN OF CARE
Problem: Plan of Care - These are the overarching goals to be used throughout the patient stay.    Goal: Plan of Care Review  Description: The Plan of Care Review/Shift note should be completed every shift.  The Outcome Evaluation is a brief statement about your assessment that the patient is improving, declining, or no change.  This information will be displayed automatically on your shift note.  Outcome: Progressing   Goal Outcome Evaluation:                    Neuro: A&Ox4  Cardiac: ST. LVAD HM3, MAP 78-79          Respiratory: Sating above 90% on RA. CPAP worn overnight.  GI/: Adequate urine output. BM X2.  Diet/appetite: Tolerating low sodium, 2L FR diet. Poor appetite.   Activity:  Assist of 1, up to bedside commode.  Pain: Denies.  Skin: No new deficits noted.  LDA's: R SL PICC- infusing bumex 2mg/hr.      Plan: Continue with POC. Notify primary team with changes.

## 2023-05-26 NOTE — PLAN OF CARE
Neuro: A&Ox2-3 confused & withdrawn. Speech slurred and tremors.    Cardiac: SR w/ BBB. VSS. On tele. LVAD HM3, MAP low 80's. Afibrile  Respiratory: Sating > 95 on RA.  GI/: Adequate urine output w/ urinal. BM X1  Diet/appetite: Tolerating Regular diet. Poor appetite, Refused food.  Activity:  Assist of 1 up, not OOB this shit  Pain: At acceptable level on current regimen.   Skin: No new deficits noted.  LDA's: R single picc infusing Bumex @ 8mL/hr, new R PIV inserted @ 1630 for new antibiotic Meropenem.     Plan: Continue with POC. Notify primary team with changes.     Pt was confused and was having worsening neurological change in AM. Mervat was @ bedside ordered Head CT, chest X-ray, blood cultures ua/uc. Head CT negative. EEG and change of antibiotic Cefepime. Cefepime discontinued per ID due to suspected CNS toxicity. Meropenem started. PIV ordered due to incompatibility with Bumex. Pt clearing this evening, more conversational and less tremulous.

## 2023-05-26 NOTE — PROGRESS NOTES
D: Stopped by to see patient and spouse. No VAD related questions or concerns at this time.   I: Discussed POC and provided support and listened to patient and caregiver's thoughts and concerns.  P: Continue to follow patient and address any questions or concerns patient and or caregiver may have.

## 2023-05-26 NOTE — PROGRESS NOTES
Brief GI note:    A. DUODENUM, BIOPSY:  - Unremarkable duodenal mucosa  - No evidence of celiac disease    B. GASTRIC, POLYP:  - Gastric hyperplastic polyp with ulceration  - No H. pylori-like organisms identified on routine staining  - No intestinal metaplasia identified    C. GASTRIC, BIOPSY:  - Reactive gastropathy with mucosal iron deposition  - No H. pylori-like organisms identified on routine staining  - No intestinal metaplasia identified    D. ASCENDING COLON, POLYP:  - Unremarkable colonic mucosa  - No histologic evidence of a polyp identified    E. TRANSVERSE COLON, POLYP:  - Tubular adenoma  - No high-grade dysplasia or malignancy identified    F. SIGMOID COLON, POLYP:  - Tubular adenoma  - No high-grade dysplasia or malignancy identified    Gastric polyp was hyperplastic polyp with ulceration. This is benign. Given unclear if this is a source of anemia (not actively bleeding on EGD), and risk of bleeding from the resection procedure, will hold off on resection at this time. If further anemia/bleeding could consider removal. He has to be off anticoagulation post  Resection for 72 hours.    Discussed with Dr. Mota and Dr. Todd.  Jessie Valencia MD  Gastroenterology/Hepatology Fellow

## 2023-05-26 NOTE — PROGRESS NOTES
[facility]   Cardiology II Service / Advanced Heart Failure  Daily Progress Note      Patient: Eliseo Tanner  MRN: [id number]  Admission Date: 5/19/2023  Hospital Day # 7    Assessment and Plan: Eliseo Tanner is a 67 year old male with chronic systolic heart failure secondary to NICM (Stage D, NYHA Class IIIA) s/p HM 3 (2/18/2020), moderate CAD, HTN, ABHINAV on CPAP, DM2, CKD Stage III, ANA. 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 He was admitted on 5/20/23 for acute on chronic anemia associated with recurrent falls, lightheadedness, and hypotension.    Today's Plan:  - Stopped cefepime, started meropenum  - Drivline culture pending  - Appreciate neurology consult  - Appreciate ID consult  - Broad AMS/infectious work- up: LA, LDH, Blood cultures, CXR, urine culture, ammonia- negative thus far  - Continue bumex gtt  - Continue PTA kcl 80 meq BID  - q12h Hgb, Transfuse to keep Hgb > 7.   - q12h BMP    # Altered Mental Status, improved. Patient with behavorial and mood changes overnight 5/24-5/25, not sleeping well, fixated on ambiguous tasks, etc. He does have more of a stutter, but no word-finding. Cranial nerves 2-12 intact. Symmetric. 5/5 upper and lower extremity strength. No asterixis. No pronator drift. Broad differential, but stoke appears unlikely given his reassuring neurologic exam. Considering metabolic encephalopathy, delerium, etc.  Head CT negative. Thus far infectious work up negative. Ammonia WNL and no asterixis. EEG negative.  - Returned nearly to baseline today  - Broad AMS/infectious work- up: LA, LDH, Blood cultures, CXR, urine culture. Negative thus far.   - Improving off Cefepime  - Delerium precautions  - Seroquel PRN QHS    # Acute on chronic anemia  # Chronic BELA  Hgb 6.7 on admit from  baseline 9-10, had been drifting as an outpatient. No signs of overt bleeding. Hypotensive on admit (MAP 59) and has positional lightheadedness. Lactate 2.1 on admit. Unclear source for worsened anemia. Does have multiple lacerations/hematomas on arms from falls which could be contributing. No evidence of acute GI bleed given lack of hematochezia/melena or diarrhea, however, he does have known BELA and hasn't been scoped per review of our records. No evidence of RP bleed on exam. Hemolysis in setting of LVAD, infection, etc is also less likely. Patient with EGD and Colonoscopy on 2/22, multiple inflammatory polyps were identified, a number were removed and biopsied, one appeared to be a possible bleeding source and after removal site was clipped x3.  - Appreciate GI consult  - q12h hgb  - Goal Hgb > 7, last transfusion 5/20  - Resuming coumadin, no bridge  - Continue holding ASA  - BID po protonix 40 mg BID  - Iron labs: iron 26, TSAT 10, defer IV iron in setting of transfusion 5/20. - Consider outpatient iron supplementation (PO vs IV)  - No need for repeat EGD per advanced endoscopy  - Repeat colonoscopy in 3 years  - No further scope inpatient unless having ongoing bleeding     # Acute on chronic systolic heart failure/HFrEF secondary to NICM  # Acute on chronic RV failure  # s/p HM3 LVAD  Stage D. NYHA Class IIIB.  Echo 4/28/23 septum normal, AV opening with each systole, and LVIDd 5.4 cm. RHC during last admit at 5800 rpm 4/28/23 mRA-12, mPCWP-12, JOSE Co-7, JOSE CI-3.6-note with drop in LVAD speed no significant improvement in RA with increase in wedge, weight 178 lb that day. Patient was diuresed with IV diuretics, didn't get back to EDW at time of discharge because pt wanted to go home early.  Last discharge on 5/3 weight 188 lb.   -Fluid status: hypervolemic.  -Diuresis:Continue bumex gtt with IV push of 5, holding PTA torsemide 100 mg TID and IV bumex three times weekly in setting of hypotension and PTA  hydrochlorothiazide 100 mg daily.  -RV support: None. Attempted cilostazol previously but discontinued due to epistaxis.   -ACEi/ARB/ARNi: contraindicated due to renal dysfunction  -BB: contraindicated due to RV failure  -Aldosterone antagonist: contraindicated due to renal dysfunction   -SGLT2i: Hold PTA jardiance in setting of hypotension  -Afterload reduction: Holding PTA hydralazine 50 mg tid, imdur 120 mg daily, and amlodipine 10 mg daily in setting of hypotension  -SCD prophylaxis: ICD  -LDH trends: 300s, stable   -Anticoagulation: holding warfarin (see above), INR goal 1.7-2.3 due to epistaxis  -Antiplatelet: holding ASA 81 mg daily, do not plan to resume (see above)     # Tropinemia, improved   Troponin 155 on admit, increased from recent baseline. No chest pain or anginal equivalents. EKG without acute ST/T changes. Most likely demand ischemia in setting of acute on chronic anemia and hypotension.  - 155->148  - No further trending indicated    # Falls, generalized fatigue  Pt with 2 weeks of generalized fatigue, positional lightheadedness associated with frequent falls. Mildly hypotensive on admission. Symptoms likely 2/2 orthostasis in setting of acute on chronic anemia.  - CT head without intracranial bleed  - Treating anemia as above  - Antihypertensive management as below  - PT/OT     # BERNARDA on CKD Stage III, stable  # Diuretic induced hypokalemia  BERNARDA during recent admission, Cr was 2.59 at discharge and admitted at 2.34. Kidney injury likely multifactorial but primary  at this time is likely pre-renal from hypotension related to acute on chronic anemia.  - Cr 1.58, trend BMP  - K >4, Mg >2, on protocol     # Chronic hyponatremia  Na fluctuates, has been in 127-132 range for past month. Admitted at 127. Unlikely a  of pt's fatigue/falls given chronicity.  - Na 135, trend BMP daily     # Chronic LVAD drive line infection, MSSA and PSA.   Follows with LVAD ID, had recently been switched from  cipro and daptomycin to cefepime on 5/16 d/t culture growing quinolone resistant PSA and no MSSA. Infection appears to be well controlled - he is afebrile on admit with normal WBC, and driveline site is without drainage, erythema, or pain on admission.   - Appreciate ID consult  - F/up wound culture (5/26)  - Stopped IV cefepime (renally dosed 1g q24h)-started 5/11. Dapto stopped 5/11; driveline cx neg MSSA  - Started meropenum 500 mg BID (renally dosed)  - Consider ID consult if inpatient issues arise  - Outpatient follow-up with LVAD ID (virtual mid-June)     # ?Recently diagnosed cirrhosis  Mildly elevated LFTs, stable. Had evaluation including liver biopsy during last admission which showed cirrhosis - likely multifactorial (remote EtOH misuse, NAFLD, chronic congestive hepatopathy from RV failure). No evidence of decompensation.  - CT A/P (5/20) with stable small volume ascites and mild mesenteric/retroperitoneal edema/cholelithiasis   - Needs outpatient follow-up with hepatology     # Hypothyroidism.   - Levothyroxine to 100 mcg daily (increased 5/2), repeat TSH 8.09/T4 0.97 on 5/21  - Repeat TSH ~6/15/23    # Tremor, longstanding, intermittent, happens both at rest and with activity but not consistently. No asterixis. No intention tremor on exam. No resint tremor on exam.   - Would f/up without patient neurology (needs referal on discharge to local/[facility] neurology)     Chronic Medical Conditions:   BPH. Continue PTA meds.   Gout. Continue Allopurinol.    DM Type II, controlled.   Mood Disorder. Continue Paxil.   ABHINAV: CPAP        Diet: Fluid restriction 2000 ML FLUID, Clears  DVT Prophylaxis: Pneumatic Compression Devices, holding warfarin   Guevara Catheter: Not present  Cardiac Monitoring: Telemetry   Code Status: Full Code    ================================================================    Subjective/24-Hr Events:   Last 24 hr care team notes reviewed. Overnight slept better. Personality is back to  "baseline today. He doesn't remember much of yesterday.Today he denies headache, lightheadedness, dizziness, shortness of breath, swelling ,bleeding, and driveline concerns.     ROS:  4 point ROS including respiratory, CV, GI and  (other than that noted in the HPI) is negative.     Medications: Reviewed in EPIC.     Physical Exam:   BP 99/82 (BP Location: Left arm)   Pulse 105   Temp 98.5  F (36.9  C) (Oral)   Resp 18   Ht 1.702 m (5' 7\")   Wt 85.9 kg (189 lb 6 oz)   SpO2 98%   BMI 29.66 kg/m      GENERAL: Appears comfortable, in no distress.  HEENT: Eye symmetrical, no discharge or icterus bilaterally. Mucous membranes moist and without lesions.  NECK: Supple, JVD upper third of neck at 90 degrees  CV: + LVAD hum   RESPIRATORY: Respirations regular, even, and unlabored. Lungs CTA throughout.    GI: Soft and non distended with normoactive bowel sounds present in all quadrants. No tenderness, rebound, guarding.   EXTREMITIES: No peripheral edema. All extremities are warm and well perfused.  NEUROLOGIC: Alert and oriented x3.  He does have more of a stutter, but no word-finding. Cranial nerves 2-12 intact. Symmetric. 5/5 upper and lower extremity strength. No asterixis. No pronator drift.    MUSCULOSKELETAL: No joint swelling or tenderness.   SKIN: No jaundice. No rashes or lesions.         Labs:  CMP  Recent Labs   Lab 05/26/23  0400 05/25/23  1546 05/25/23  1059 05/25/23  0437 05/24/23  1616 05/24/23  0516 05/23/23  1754 05/23/23  0456 05/20/23  0039 05/20/23  0030    137 134* 135*   < > 132*   < > 133*   < > 127*   POTASSIUM 3.9 4.0 3.6  3.6  3.6 3.1*   < > 4.5   < > 3.9   < > 2.7*   CHLORIDE 107 103 99 99   < > 100   < > 99   < > 84*   CO2 23 22 23 24   < > 22   < > 24   < > 25   ANIONGAP 11 12 12 12   < > 10   < > 10   < > 18*   GLC 96 142* 145* 146*   < > 182*   < > 152*   < > 193*   BUN 41.3* 44.2* 44.3* 43.4*   < > 44.1*   < > 48.0*   < > 108.0*   CR 1.78* 1.78* 1.73* 1.78*   < > 1.58*   < > " 1.58*   < > 2.34*   GFRESTIMATED 41* 41* 42* 41*   < > 47*   < > 47*   < > 29*   CALOS 7.9* 7.9* 7.8* 7.8*   < > 7.4*   < > 7.4*   < > 8.3*   MAG 1.8  --   --  1.6*  --  2.6*  --  1.8   < >  --    PROTTOTAL  --   --  6.8  --   --   --   --   --   --  6.5   ALBUMIN  --   --  3.1*  --   --   --   --   --   --  3.2*   BILITOTAL  --   --  0.6  --   --   --   --   --   --  0.6   ALKPHOS  --   --  140*  --   --   --   --   --   --  121   AST  --   --  66*  --   --   --   --   --   --  78*   ALT  --   --  52*  --   --   --   --   --   --  53*    < > = values in this interval not displayed.       CBC  Recent Labs   Lab 05/26/23  0400 05/25/23  1546 05/25/23  1059 05/25/23  0437   WBC 10.0 10.0 9.0 8.6   RBC 2.86* 2.86* 2.50* 2.85*   HGB 8.3* 8.3* 7.3* 8.4*   HCT 27.8* 28.2* 24.7* 27.6*   MCV 97 99 99 97   MCH 29.0 29.0 29.2 29.5   MCHC 29.9* 29.4* 29.6* 30.4*   RDW 17.8* 17.8* 17.9* 18.0*    189 191 193       INR  Recent Labs   Lab 05/26/23  0400 05/25/23  0437 05/24/23  0516 05/23/23  0705   INR 1.51* 1.37* 1.32* 1.36*       Time/Communication  I personally spent a total of 40 minutes. Of that 20 minutes was counseling/coordination of patient's care. Plan of care discussed with patient. See my note above for details.    Patient discussed with Dr. Tomas.      KOREY PadronC  Advanced Heart Failure/Cardiology II Service  Pager [phone] [facility] [id number]

## 2023-05-27 NOTE — PROGRESS NOTES
[facility]   Cardiology II Service / Advanced Heart Failure  Daily Progress Note      Patient: Eliseo Tanner  MRN: [id number]  Admission Date: 5/19/2023  Hospital Day # 8    Assessment and Plan: Eliseo Tanner is a 67 year old male with chronic systolic heart failure secondary to NICM (Stage D, NYHA Class IIIA) s/p HM 3 (2/18/2020), moderate CAD, HTN, ABHINAV on CPAP, DM2, CKD Stage III, ANA. 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 He was admitted on 5/20/23 for acute on chronic anemia associated with recurrent falls, lightheadedness, and hypotension.    Today's Plan:  - Continue meropenum 500 mg BID, may need dosing changes if renal function improves  - Driveline culture pending  - Appreciate ID consult- final ID plan pending  - Continue bumex gtt, give 1000 mg diuril today with additional kcl  - q12h BMP    # Altered Mental Status, improved. Patient with behavorial and mood changes overnight 5/24-5/25, not sleeping well, fixated on ambiguous tasks, etc. He does have more of a stutter, but no word-finding. Cranial nerves 2-12 intact. Symmetric. 5/5 upper and lower extremity strength. No asterixis. No pronator drift. Broad differential, but stoke appears unlikely given his reassuring neurologic exam. Considering metabolic encephalopathy, delerium, etc.  Head CT negative. Thus far infectious work up negative. Ammonia WNL and no asterixis. EEG negative. Returned to baseline afterr 24 hours. Initially thought to be cefepime but improved quicker than would be expected.  - Blood cultures NGTD  - Delerium precautions  - Seroquel PRN at bedtime  - Stop PTA ambien    # Acute on chronic anemia  # Chronic BELA  Hgb 6.7 on admit from baseline 9-10, had been drifting as an outpatient. No signs of overt bleeding. Hypotensive  on admit (MAP 59) and has positional lightheadedness. Lactate 2.1 on admit. Unclear source for worsened anemia. Does have multiple lacerations/hematomas on arms from falls which could be contributing. There was no evidence of acute GI bleed given lack of hematochezia/melena or diarrhea, however, he does have known BELA and hasn't been scoped per review of our records. No evidence of RP bleed on exam. Hemolysis in setting of LVAD, infection, etc is also less likely. Patient with EGD and Colonoscopy on 2/22, multiple inflammatory polyps were identified, a number were removed and biopsied, one appeared to be a possible bleeding source and after removal site was clipped x3. There are some remaining gastric polyps, but per GI, no plan for removal unless further bleeding.  - Appreciate GI consult  - q12h hgb  - Goal Hgb > 7, last transfusion 5/20  - Resuming coumadin, no bridge  - Continue holding ASA  - BID po protonix 40 mg BID (in place of PTA omeprazole)  - Iron labs: iron 26, TSAT 10, defer IV iron in setting of transfusion 5/20. - Consider outpatient iron supplementation (PO vs IV)  - No need for repeat EGD per advanced endoscopy  - Repeat colonoscopy in 3 years     # Acute on chronic systolic heart failure/HFrEF secondary to NICM  # Acute on chronic RV failure  # s/p HM3 LVAD  Stage D. NYHA Class IIIB.  Echo 4/28/23 septum normal, AV opening with each systole, and LVIDd 5.4 cm. RHC during last admit at 5800 rpm 4/28/23 mRA-12, mPCWP-12, JOSE Co-7, JOSE CI-3.6-note with drop in LVAD speed no significant improvement in RA with increase in wedge, weight 178 lb that day. Patient was diuresed with IV diuretics, didn't get back to EDW at time of discharge because pt wanted to go home early.  Last discharge on 5/3 weight 188 lb.   -Fluid status: hypervolemic.  -Diuresis:Continue bumex gtt at 2 and will give diuril 1g today as well PTA torsemide 100 mg TID and IV bumex three times weekly in setting of hypotension and PTA  hydrochlorothiazide 100 mg daily.  -RV support: None. Attempted cilostazol previously but discontinued due to epistaxis.   -ACEi/ARB/ARNi: contraindicated due to renal dysfunction  -BB: contraindicated due to RV failure  -Aldosterone antagonist: contraindicated due to renal dysfunction   -SGLT2i: Hold PTA jardiance in setting of hypotension  -Afterload reduction: Holding PTA hydralazine 50 mg tid, imdur 120 mg daily, and amlodipine 10 mg daily in setting of hypotension  -SCD prophylaxis: ICD  -LDH trends: 300s, stable   -Anticoagulation: holding warfarin (see above), INR goal 1.7-2.3 due to epistaxis  -Antiplatelet: holding ASA 81 mg daily, do not plan to resume (see above)    # Falls, generalized fatigue  Pt with 2 weeks of generalized fatigue, positional lightheadedness associated with frequent falls. Mildly hypotensive on admission. Symptoms likely 2/2 orthostasis in setting of acute on chronic anemia. CT head without intracranial bleed  - Treating anemia, volume, and BPs as above  - PT/OT, current recommendation is TCU     # BERNARDA on CKD Stage III, stable  # Diuretic induced hypokalemia  BERNARDA during recent admission, Cr was 2.59 at discharge and admitted at 2.34. Kidney injury likely multifactorial but primary  at this time is likely pre-renal from hypotension related to acute on chronic anemia.  - Cr 1.78, trend BMP  - K >4, Mg >2, on protocol     # Chronic hyponatremia  Na fluctuates, has been in 127-132 range for past month. Admitted at 127. Unlikely a  of pt's fatigue/falls given chronicity.  - Na 136, trend BMP daily     # Chronic LVAD drive line infection, MSSA and PSA.   Follows with LVAD ID, had recently been switched from cipro and daptomycin to cefepime on 5/16 d/t culture growing quinolone resistant PSA and no MSSA. However, cefepime stopped on 5/25 d/t confusion. He also had increased drainage (clear, but increased in volume). He was switched to meropenum.  - Appreciate ID consult  - F/up  wound culture (5/26)  - Started meropenum 500 mg BID (renally dosed)  - Patient does not have home IV abx coverage, gets his antibiotics daily at his local hospital- per wife and pt, they are able to handle twice a day IV antibiotics in this fashion   - Outpatient follow-up with LVAD ID (virtual mid-June)     # ?Recently diagnosed cirrhosis  Mildly elevated LFTs, stable. Had evaluation including liver biopsy during last admission which showed cirrhosis - likely multifactorial (remote EtOH misuse, NAFLD, chronic congestive hepatopathy from RV failure). No evidence of decompensation.  - CT A/P (5/20) with stable small volume ascites and mild mesenteric/retroperitoneal edema/cholelithiasis   - Needs outpatient follow-up with hepatology    MELD-Na score: 20 at 5/27/2023  6:54 AM  MELD score: 19 at 5/27/2023  6:54 AM  Calculated from:  Serum Creatinine: 1.79 mg/dL at 5/27/2023  6:54 AM  Serum Sodium: 136 mmol/L at 5/27/2023  6:54 AM  Total Bilirubin: 0.6 mg/dL (Using min of 1 mg/dL) at 5/25/2023 10:59 AM  INR(ratio): 1.90 at 5/27/2023  6:54 AM  Age: 67 years     # Hypothyroidism.   - Levothyroxine to 100 mcg daily (increased 5/2), repeat TSH 8.09/T4 0.97 on 5/21  - Repeat TSH ~6/15/23    # Tremor, longstanding, intermittent, happens both at rest and with activity but not consistently. No asterixis. No intention tremor on exam. No resint tremor on exam.   - Would f/up without patient neurology (needs referal on discharge to local/[place] neurology)    # Tropinemia, improved   Troponin 155 on admit, increased from recent baseline. No chest pain or anginal equivalents. EKG without acute ST/T changes. Most likely demand ischemia in setting of acute on chronic anemia and hypotension.  - 155->148  - No further trending indicated     Chronic Medical Conditions:   BPH. Continue PTA meds.   Gout. Continue Allopurinol.    DM Type II, controlled.   Mood Disorder. Continue Paxil.   ABHINAV: CPAP        Diet: Fluid restriction 2000  "ML FLUID, Clears  DVT Prophylaxis: Pneumatic Compression Devices, holding warfarin   Guevara Catheter: Not present  Cardiac Monitoring: Telemetry   Code Status: Full Code    ================================================================    Subjective/24-Hr Events:   Last 24 hr care team notes reviewed. Overnight slept better. Personality is back to baseline today. .Today he denies headache, lightheadedness, dizziness, shortness of breath, swelling ,bleeding, and driveline concerns. Pad is 20. Wife agrees that he is back to baseline.    ROS:  4 point ROS including respiratory, CV, GI and  (other than that noted in the HPI) is negative.     Medications: Reviewed in EPIC.     Physical Exam:   BP 99/82 (BP Location: Left arm)   Pulse 101   Temp 98  F (36.7  C) (Oral)   Resp 20   Ht 1.702 m (5' 7\")   Wt 86.9 kg (191 lb 9.3 oz)   SpO2 97%   BMI 30.01 kg/m      GENERAL: Appears comfortable, in no distress.  HEENT: Eye symmetrical, no discharge or icterus bilaterally. Mucous membranes moist and without lesions.  NECK: Supple, JVD upper third of neck at 90 degrees  CV: + LVAD hum   RESPIRATORY: Respirations regular, even, and unlabored. Lungs CTA throughout.    GI: Soft and non distended with normoactive bowel sounds present in all quadrants. No tenderness, rebound, guarding.   EXTREMITIES: No peripheral edema. All extremities are warm and well perfused.  NEUROLOGIC: Alert and oriented x3.  He does have more of a stutter, but no word-finding. Cranial nerves 2-12 intact. Symmetric. 5/5 upper and lower extremity strength. No asterixis. No pronator drift.    MUSCULOSKELETAL: No joint swelling or tenderness.   SKIN: No jaundice. No rashes or lesions.     Labs:  CMP  Recent Labs   Lab 05/27/23  0654 05/26/23  1602 05/26/23  0400 05/25/23  1546 05/25/23  1059 05/25/23  0437 05/24/23  1616 05/24/23  0516    136 141 137 134* 135*   < > 132*   POTASSIUM 3.5 4.7 3.9 4.0 3.6  3.6  3.6 3.1*   < > 4.5   CHLORIDE 104 104 " 107 103 99 99   < > 100   CO2 21* 22 23 22 23 24   < > 22   ANIONGAP 11 10 11 12 12 12   < > 10   * 167* 96 142* 145* 146*   < > 182*   BUN 38.4* 43.2* 41.3* 44.2* 44.3* 43.4*   < > 44.1*   CR 1.79* 1.74* 1.78* 1.78* 1.73* 1.78*   < > 1.58*   GFRESTIMATED 41* 42* 41* 41* 42* 41*   < > 47*   CALOS 7.5* 8.0* 7.9* 7.9* 7.8* 7.8*   < > 7.4*   MAG 1.4*  --  1.8  --   --  1.6*  --  2.6*   PROTTOTAL  --   --   --   --  6.8  --   --   --    ALBUMIN  --   --   --   --  3.1*  --   --   --    BILITOTAL  --   --   --   --  0.6  --   --   --    ALKPHOS  --   --   --   --  140*  --   --   --    AST  --   --   --   --  66*  --   --   --    ALT  --   --   --   --  52*  --   --   --     < > = values in this interval not displayed.       CBC  Recent Labs   Lab 05/27/23  0654 05/26/23  1602 05/26/23  0400 05/25/23  1546   WBC 10.0 11.9* 10.0 10.0   RBC 2.87* 2.98* 2.86* 2.86*   HGB 8.3* 8.6* 8.3* 8.3*   HCT 28.4* 31.1* 27.8* 28.2*   MCV 99 104* 97 99   MCH 28.9 28.9 29.0 29.0   MCHC 29.2* 27.7* 29.9* 29.4*   RDW 18.1* 18.3* 17.8* 17.8*    190 188 189       INR  Recent Labs   Lab 05/27/23  0654 05/26/23  0400 05/25/23  0437 05/24/23  0516   INR 1.90* 1.51* 1.37* 1.32*       Time/Communication  I personally spent a total of 40 minutes. Of that 20 minutes was counseling/coordination of patient's care. Plan of care discussed with patient. See my note above for details.    Patient discussed with Dr. Tomas.      Mervat Decker PA-C  Advanced Heart Failure/Cardiology II Service  Pager [phone] [facility] [phone]

## 2023-05-27 NOTE — PLAN OF CARE
Problem: Plan of Care - These are the overarching goals to be used throughout the patient stay.    Goal: Plan of Care Review  Description: The Plan of Care Review/Shift note should be completed every shift.  The Outcome Evaluation is a brief statement about your assessment that the patient is improving, declining, or no change.  This information will be displayed automatically on your shift note.  Outcome: Progressing   Goal Outcome Evaluation:       Neuro: A&Ox4  Cardiac: ST. LVAD HM3, MAP 79-80  Respiratory: Sating above 90% on RA. CPAP worn overnight.  GI/: Adequate urine output. BM X3. Up to bedside commode.   Diet/appetite: Tolerating low sodium, 2L FR diet.   Activity:  Assist x1  Pain: Denies.  Skin: No new deficits noted.  LDA's: R SL PICC- infusing bumex 2mg/hr.      Plan: Continue with POC. Notify primary team with changes.

## 2023-05-27 NOTE — CONSULTS
Care Management Follow Up    Length of Stay (days): 8    Expected Discharge Date: 05/29/2023     Concerns to be Addressed:       Patient plan of care discussed at interdisciplinary rounds: Yes    Additional Information:  Eliseo's discharge date may be getting pushed back and needs update on IV abx and IV diuresis. Called [facility]   Phone: [phone] and left a  for a return call    Float RN Care Coordinator KHADIJAH Klein RN 5/27/2023 2:43 PM    Unit RNCC pager: [phone]     For Weekend & Holiday on call RN Care Coordinator:  (Tasks: Home care, home infusion, medical equipment/oxygen, transportation, IMM & MOON forms, etc.)     Text Paging in [employer] Smart Web is the preferred method of contact for these teams     [employer] [place] [employer] ([phone]) Saturday & Sunday; ([phone]) [facility] Recognized Holidays  Pager #2: [phone] Units: 6A, 6B, 6C, 6D

## 2023-05-27 NOTE — PROGRESS NOTES
The patient's HeartMate LVAD was interrogated 5/27/2023  * Speed 5800 rpm   * Pulsatility index 3.6   * Power 4.6 Driver   * Flow 4.9 L/minute   Fluid status: hypervolemic   Alarms were reviewed, and notable for rare pi events, no alarms,.   The driveline exit site was inspected, dressing c/d/i.   All external components were inspected and showed no evidence of damage or malfunction, none replaced.   No changes to VAD settings made

## 2023-05-27 NOTE — PLAN OF CARE
Neuro: A&Ox4.   Cardiac: ST. LVAD HM3, MAP 78-80.    Respiratory: Sating >92% on RA.  GI/: Adequate urine output. Using urinal bedside. Loose BM X1.  Diet/appetite: Tolerating low Na, 2L fluid restriction diet.   Activity:  Assist of 1 up to bedside commode.   Pain: At acceptable level on current regimen. Provided PRN tylenol x1 for bilateral arm and leg aching pain with some relief.   Skin: No new deficits noted. Scattered bruising bilateral arms.   LDA's: R Single lumen PICC infusing bumex at 2mg/hr. R PIV SL.     Plan: Will continue to monitor and follow POC. Notify primary team with changes.

## 2023-05-28 NOTE — PROGRESS NOTES
05/28/23 1400   Appointment Info   Signing Clinician's Name / Credentials (OT) Phan Valderrama OTR/L   Living Environment   People in Home spouse;grandchild(arnold)   Current Living Arrangements house   Home Accessibility no concerns   Transportation Anticipated family or friend will provide   Living Environment Comments Pt has a tub/shower with grab bars and a shower chair.   Self-Care   Usual Activity Tolerance good   Current Activity Tolerance fair   Regular Exercise No   Equipment Currently Used at Home cane, straight;grab bar, tub/shower;shower chair   Fall history within last six months yes   Number of times patient has fallen within last six months 1   General Information   Onset of Illness/Injury or Date of Surgery 05/19/23   Referring Physician Mervat Decker PA-C   Patient/Family Therapy Goal Statement (OT) Pt would like to address current medical needs and return home independently.   Additional Occupational Profile Info/Pertinent History of Current Problem Eliseo Tanner is a 67 year old male with chronic systolic heart failure secondary to NICM (Stage D, NYHA Class IIIA) s/p HM 3 (2/18/2020), moderate CAD, HTN, ABHINAV on CPAP, DM2, CKD Stage III, ANA. 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 He was admitted on 5/20/23 for acute on chronic anemia associated with recurrent falls, lightheadedness, and hypotension.   Existing Precautions/Restrictions fall;cardiac   Left Upper Extremity (Weight-bearing Status) full weight-bearing (FWB)   Right Upper Extremity (Weight-bearing Status) full weight-bearing (FWB)   Left Lower Extremity (Weight-bearing Status) full weight-bearing (FWB)   Right Lower Extremity (Weight-bearing Status) full weight-bearing (FWB)   Cognitive Status Examination   Orientation Status orientation to  person, place and time   Affect/Mental Status (Cognitive) anxious   Cognitive Status Comments Pt reports feeling anxious and slightly confused.   Visual Perception   Visual Impairment/Limitations WFL   Sensory   Sensory Quick Adds sensation intact   Pain Assessment   Patient Currently in Pain No   Range of Motion Comprehensive   Comment, General Range of Motion BUE AROM WFL   Strength Comprehensive (MMT)   Comment, General Manual Muscle Testing (MMT) Assessment BUE strength grossly 4/5.  strength WFL.   Bed Mobility   Comment (Bed Mobility) CGA and vc's.   Transfers   Transfer Comments CGA and vc's.   Activities of Daily Living   BADL Assessment/Intervention toileting;grooming   Lower Body Dressing Assessment/Training   Harlan Level (Lower Body Dressing) set up;verbal cues;contact guard assist   Comment, (Lower Body Dressing) Per clinical judgement.   Grooming Assessment/Training   [place]an Level (Grooming) set up;verbal cues;contact guard assist   Comment, (Grooming) per clinical judgement.   Toileting   Harlan Level (Toileting) set up;verbal cues;contact guard assist   Comment, (Toileting) Per clinical judgement.   Clinical Impression   Criteria for Skilled Therapeutic Interventions Met (OT) Yes, treatment indicated   OT Diagnosis Decreased indpendence with functional transfers and ADLs.   OT Problem List-Impairments impacting ADL problems related to;activity tolerance impaired;cognition;fear & anxiety;range of motion (ROM);strength   Assessment of Occupational Performance 1-3 Performance Deficits   Identified Performance Deficits Decreased independnece with functional transfers and ADLS/IADLS.   Planned Therapy Interventions (OT) ADL retraining;IADL retraining;cognition;ROM;stretching;strengthening;transfer training;home program guidelines;progressive activity/exercise   Clinical Decision Making Complexity (OT) low complexity   Risk & Benefits of therapy have been explained  evaluation/treatment results reviewed;care plan/treatment goals reviewed;patient   OT Total Evaluation Time   OT Porscheal, Low Complexity Minutes (38317) 10   OT Goals   Therapy Frequency (OT) Daily   OT Predicted Duration/Target Date for Goal Attainment 06/11/23   OT: Hygiene/Grooming independent;within precautions;while standing   OT: Lower Body Dressing Independent;within precautions   OT: Transfer within precautions;Modified independent  (tub/shower transfer with grab bars)   OT: Toilet Transfer/Toileting Independent;toilet transfer;cleaning and garment management;within precautions   OT: Cognitive Patient/caregiver will verbalize understanding of cognitive assessment results/recommendations as needed for safe discharge planning   Total Session Time   Total Session Time (sum of timed and untimed services) 10

## 2023-05-28 NOTE — PLAN OF CARE
"  Problem: Plan of Care - These are the overarching goals to be used throughout the patient stay.    Goal: Patient-Specific Goal (Individualized)  Description: You can add care plan individualizations to a care plan. Examples of Individualization might be:  \"Parent requests to be called daily at 9am for status\", \"I have a hard time hearing out of my right ear\", or \"Do not touch me to wake me up as it startles me\".  Outcome: Adequate for Care Transition   Goal Outcome Evaluation:       Neuro: A&Ox4. Patient has been anxious and slightly confused today but he is able to answer all orientation questions correctly. Team is aware. See note for details.   Cardiac: ST. LVAD HM3, MAP 78-80  Respiratory: Sating above 90% on RA. CPAP worn overnight.  GI/: Adequate urine output. BM X1. Up to bedside commode.   Diet/appetite: Tolerating low sodium, 2L FR diet. Poor appetite today.   Activity:  Assist x1 up to commode and in halls.   Pain: Denies.  Skin: No new deficits noted.  LDA's: R SL PICC- infusing potassium replacement. R PIV-SL.      Plan: Page team with any further disorientation/confusion concerns.   Continue with POC. Notify primary team with changes.                  "

## 2023-05-28 NOTE — PROGRESS NOTES
[facility]   Cardiology II Service / Advanced Heart Failure  Daily Progress Note      Patient: Eliseo Tanner  MRN: [id number]  Admission Date: 5/19/2023  Hospital Day # 9    Assessment and Plan: Eliseo Tanner is a 67 year old male with chronic systolic heart failure secondary to NICM (Stage D, NYHA Class IIIA) s/p HM 3 (2/18/2020), moderate CAD, HTN, ABHINAV on CPAP, DM2, CKD Stage III, ANA. 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 He was admitted on 5/20/23 for acute on chronic anemia associated with recurrent falls, lightheadedness, and hypotension.    Today's Plan:  - Continue meropenum 500 mg BID, may need dosing changes if renal function improves  - Driveline culture pending  - Appreciate ID consult- final ID plan pending  - Pending cardiomems, may be able to stop IV diuretics and convert to POs  - Replace Kcl aggressively and recheck at 1 pm  - Start IV iron course    # Acute on chronic systolic heart failure/HFrEF secondary to NICM  # Acute on chronic RV failure  # s/p HM3 LVAD  Stage D. NYHA Class IIIB.  Echo 4/28/23 septum normal, AV opening with each systole, and LVIDd 5.4 cm. RHC during last admit at 5800 rpm 4/28/23 mRA-12, mPCWP-12, JOSE Co-7, JOSE CI-3.6-note with drop in LVAD speed no significant improvement in RA with increase in wedge, weight 178 lb that day. Patient was diuresed with IV diuretics, didn't get back to EDW at time of discharge because pt wanted to go home early.  Last discharge on 5/3 weight 188 lb.   -Fluid status: near euvolemic, cardiomems pending  -Diuresis:Continue bumex gtt at 2, pending cardiomems, may be able to resume home diuretic regiemn: PTA torsemide 100 mg TID and IV bumex three times weekly and PTA hydrochlorothiazide 100 mg daily.  -RV support: None. Attempted  cilostazol previously but discontinued due to epistaxis.   -ACEi/ARB/ARNi: contraindicated due to renal dysfunction  -BB: contraindicated due to RV failure  -Aldosterone antagonist: contraindicated due to renal dysfunction   -SGLT2i: Hold PTA jardiance in setting of hypotension  -Afterload reduction: Holding PTA hydralazine 50 mg tid, imdur 120 mg daily, and amlodipine 10 mg daily in setting of hypotension. IF having more MAPs >85, will consider a low dose of amlodipine as first addition back  -SCD prophylaxis: ICD  -LDH trends: 300s, stable   -Anticoagulation: holding warfarin (see above), INR goal 1.7-2.3 due to epistaxis  -Antiplatelet: holding ASA 81 mg daily, do not plan to resume (see below)    # Acute on chronic anemia  # Chronic BELA  Hgb 6.7 on admit from baseline 9-10, had been drifting as an outpatient. No signs of overt bleeding. Hypotensive on admit (MAP 59) and has positional lightheadedness. Lactate 2.1 on admit. Unclear source for worsened anemia. He did have multiple lacerations/hematomas on arms from falls which could be contributing. There was no evidence of acute GI bleed given lack of hematochezia/melena or diarrhea, however, he does have known BELA and hasn't been scoped per review of our records. No evidence of RP bleed on exam. Hemolysis in setting of LVAD, infection, etc is also less likely. Patient with EGD and Colonoscopy on 2/22, multiple inflammatory polyps were identified, a number were removed and biopsied, one appeared to be a possible bleeding source and after removal site was clipped x3. There are some remaining gastric polyps, but per GI, no plan for removal unless further bleeding. Biopsies were notable for inflammatory polyps in the the stomach and duodenum and tubular adenomas in the colon.  - Appreciate GI consult  - Daily hgb  - Goal Hgb > 7, last transfusion 5/20  - Resuming coumadin, no bridge  - Stopped PTA ASA  - BID po protonix 40 mg BID (in place of PTA omeprazole)  - Iron  labs: iron 26, TSAT 10, will start IV iron course, venofer 300 mg q72 hours x3, dose #1 on 5/28  - No need for repeat EGD as outpatient per advanced endoscopy unless further bleeding, H. Pylori  - Repeat colonoscopy in 3 years    # Altered Mental Status, resolved. Patient with behavorial and mood changes overnight 5/24-5/25, not sleeping well, fixated on ambiguous tasks, etc. He did have more of a stutter, but no word-finding. Cranial nerves 2-12 intact. Symmetric. 5/5 upper and lower extremity strength. No asterixis. No pronator drift. Broad differential, but stoke appears unlikely given his reassuring neurologic exam. Considering metabolic encephalopathy, delerium, etc.  Head CT negative. Thus far infectious work up negative. Ammonia WNL and no asterixis. EEG negative. Returned to baseline afterr 24 hours. Initially thought to be cefepime, so this was stopped, but improved quicker than would be expected (before he missed a dose).  - Blood cultures NGTD  - Delerium precautions  - Seroquel PRN at bedtime  - Stopped PTA ambien, no plans to resume, okay per patient and wife    # Falls, generalized fatigue  Pt with 2 weeks of generalized fatigue, positional lightheadedness associated with frequent falls. Mildly hypotensive on admission. Symptoms likely 2/2 orthostasis in setting of acute on chronic anemia. CT head without intracranial bleed  - Treating anemia, volume, and BPs as above  - PT/OT, current recommendation is TCU, family may decline     # BERNARDA on CKD Stage III, stable  # Diuretic induced hypokalemia  BERNARDA during recent admission, Cr was 2.59 at discharge and admitted at 2.34. Kidney injury likely multifactorial but primary  at this time is likely pre-renal from hypotension related to acute on chronic anemia.  - Cr 1.48, trend BMP  - K >4, Mg >2, on protocol     # Chronic hyponatremia  Na fluctuates, has been in 127-132 range for past month. Admitted at 127. Unlikely a  of pt's fatigue/falls given  chronicity.  - Na 134, trend BMP daily     # Chronic LVAD drive line infection, MSSA and PSA.   Follows with LVAD ID, had recently been switched from cipro and daptomycin to cefepime on 5/16 d/t culture growing quinolone resistant PSA and no MSSA. However, cefepime stopped on 5/25 d/t confusion. He also had increased drainage (clear, but increased in volume). He was switched to meropenum.  - Appreciate ID consult  - F/up wound culture (5/26), pending  - Started meropenum 500 mg BID (renally dosed)  - Abx plan for discharge is pending  - Patient does not have home IV abx coverage, gets his antibiotics daily at his local hospital- per wife and pt, they are able to handle twice a day IV antibiotics in this fashion   - Outpatient follow-up with LVAD ID (virtual mid-June)     # ?Recently diagnosed cirrhosis  Mildly elevated LFTs, stable. Had evaluation including liver biopsy during last admission which showed cirrhosis - likely multifactorial (remote EtOH misuse, NAFLD, chronic congestive hepatopathy from RV failure). No evidence of decompensation.  - CT A/P (5/20) with stable small volume ascites and mild mesenteric/retroperitoneal edema/cholelithiasis   - Needs outpatient follow-up with hepatology    MELD-Na score: 15 at 5/27/2023  4:09 PM  MELD score: 19 at 5/27/2023  4:09 PM  Calculated from:  Serum Creatinine: 1.70 mg/dL at 5/27/2023  4:09 PM  Serum Sodium: 141 mmol/L (Using max of 137 mmol/L) at 5/27/2023  4:09 PM  Total Bilirubin: 0.6 mg/dL (Using min of 1 mg/dL) at 5/25/2023 10:59 AM  INR(ratio): 1.37 at 5/25/2023  4:37 AM  Age: 67 years     # Hypothyroidism.   - Levothyroxine to 100 mcg daily (increased 5/2), repeat TSH 8.09/T4 0.97 on 5/21  - Repeat TSH ~6/15/23    # Tremor, longstanding, intermittent, happens both at rest and with activity but not consistently. No asterixis. No intention tremor on exam. No resting tremor on exam.   - Neurology consult signed off  - Would f/up without patient neurology (needs  "referal on discharge to local/[facility] neurology)    # Hypocalcermia, improved.   - Corrected Ca about 8.6, no indications for replacement currently  - Keep magnesium >2.2  - If corrected CA <8.5, would plan for PTH, vitamin D level and replacement with calcium citrate (better absorbed when on PPI)     # Tropinemia, improved   Troponin 155 on admit, increased from recent baseline. No chest pain or anginal equivalents. EKG without acute ST/T changes. Most likely demand ischemia in setting of acute on chronic anemia and hypotension.  - 155->148  - No further trending indicated     Chronic Medical Conditions:   BPH. Continue PTA meds.   Gout. Continue Allopurinol.    DM Type II, controlled.   Mood Disorder. Continue Paxil.   ABHINAV: CPAP        Diet: Fluid restriction 2000 ML FLUID, Clears  DVT Prophylaxis: Pneumatic Compression Devices, coumadin  Guevara Catheter: Not present  Cardiac Monitoring: Telemetry   Code Status: Full Code    ================================================================    Subjective/24-Hr Events:   Last 24 hr care team notes reviewed. Overnight slept better. Personality is back to baseline today. Today he denies headache, lightheadedness, dizziness, shortness of breath, swelling ,bleeding, and driveline concerns.  No driveline pain. No fevers or chills. No blood in the urine.     ROS:  4 point ROS including respiratory, CV, GI and  (other than that noted in the HPI) is negative.     Medications: Reviewed in EPIC.     Physical Exam:   /84 (BP Location: Left arm)   Pulse 103   Temp 97.9  F (36.6  C) (Oral)   Resp 18   Ht 1.702 m (5' 7\")   Wt 83.3 kg (183 lb 10.3 oz)   SpO2 99%   BMI 28.76 kg/m      GENERAL: Appears comfortable, in no distress.  HEENT: Eye symmetrical, no discharge or icterus bilaterall  NECK: Supple, JVD middle third of neck at 90 degrees  CV: + LVAD hum   RESPIRATORY: Respirations regular, even, and unlabored. Lungs CTA throughout.    GI: Soft and non distended " with normoactive bowel sounds present in all quadrants. No tenderness, rebound, guarding.   EXTREMITIES: No peripheral edema. All extremities are warm and well perfused.  NEUROLOGIC: Alert and interacting appropriately. Cranial nerves 2-12 intact. Symmetric. 5/5 upper and lower extremity strength. No asterixis. No pronator drift.    MUSCULOSKELETAL: No joint swelling or tenderness.   SKIN: No jaundice. No rashes or lesions.     Labs:  CMP  Recent Labs   Lab 05/28/23  0646 05/27/23  1609 05/27/23  1347 05/27/23  0654 05/26/23  1602 05/25/23  1546 05/25/23  1059   * 141  --  136 136   < > 134*   POTASSIUM 2.9* 3.6  --  3.5 4.7   < > 3.6  3.6  3.6   CHLORIDE 98 107  --  104 104   < > 99   CO2 23 22  --  21* 22   < > 23   ANIONGAP 13 12  --  11 10   < > 12   * 161*  --  112* 167*   < > 145*   BUN 33.8* 37.6*  --  38.4* 43.2*   < > 44.3*   CR 1.48* 1.70*  --  1.79* 1.74*   < > 1.73*   GFRESTIMATED 51* 43*  --  41* 42*   < > 42*   CALOS 7.9* 7.7*  --  7.5* 8.0*   < > 7.8*   MAG 1.8 2.3 2.4* 1.4*  --    < >  --    PROTTOTAL  --   --   --   --   --   --  6.8   ALBUMIN  --   --   --   --   --   --  3.1*   BILITOTAL  --   --   --   --   --   --  0.6   ALKPHOS  --   --   --   --   --   --  140*   AST  --   --   --   --   --   --  66*   ALT  --   --   --   --   --   --  52*    < > = values in this interval not displayed.       CBC  Recent Labs   Lab 05/28/23  0646 05/27/23  0654 05/26/23  1602 05/26/23  0400   WBC 6.6 10.0 11.9* 10.0   RBC 2.93* 2.87* 2.98* 2.86*   HGB 8.3* 8.3* 8.6* 8.3*   HCT 28.0* 28.4* 31.1* 27.8*   MCV 96 99 104* 97   MCH 28.3 28.9 28.9 29.0   MCHC 29.6* 29.2* 27.7* 29.9*   RDW 17.8* 18.1* 18.3* 17.8*    186 190 188       INR  Recent Labs   Lab 05/28/23  0646 05/27/23  0654 05/26/23  0400 05/25/23  0437   INR 2.03* 1.90* 1.51* 1.37*       Time/Communication  I personally spent a total of 45 minutes. Of that 20 minutes was counseling/coordination of patient's care. Plan of care discussed  with patient. See my note above for details.    Patient discussed with Dr. Tomas.      Mervat Decker PA-C  Advanced Heart Failure/Cardiology II Service  Pager [phone] [phone]

## 2023-05-28 NOTE — PLAN OF CARE
Hours of Care: [id number]    Neuro: A&Ox4.   Cardiac: ST. VSS. MAP 78-82  Respiratory: Sating >92% on RA.  GI/:  Using urinal bedside. Frequent urine output. Loose BM X1  Diet/appetite: Tolerating low Na, 2L FR diet.   Activity:  Assist of 1 up to bedside commode  Pain: At acceptable level on current regimen. Denies during shift.   Skin: No new deficits noted. Scattered bruising bilateral arms.   LDA's: R single lumen PICC infusing bumex 2mg/hr. R PIV SL    Plan: Will continue to monitor and follow POC. Notify primary team with changes.

## 2023-05-28 NOTE — PROGRESS NOTES
The patient's HeartMate LVAD was interrogated 5/28/2023  * Speed 5800 rpm   * Pulsatility index 3.2   * Power 4.7 Driver   * Flow 5.1 L/minute   Fluid status: near euvolemic   Alarms were reviewed, and notable for rare pi events, no alarms.   The driveline exit site was inspected, c/d/i.   All external components were inspected and showed no evidence of damage or malfunction, none replaced.   No changes to VAD settings made

## 2023-05-28 NOTE — PROVIDER NOTIFICATION
"MD Decker called to bedside to see patient d/t patient feeling anxious and \"needing to do something\" but he can not verbalize what he needed to do. RN concerned for possible new onset confusion.   VSS. MAP 79.   Oriented x4.  No new orders placed.   Watch patient and page with any acute changes.       Sarah Tinsley RN on 5/28/2023 at 12:51 PM  "

## 2023-05-28 NOTE — PROGRESS NOTES
General Infectious Disease Service Progress Note- Yellow Team  Patient:  Eliseo Tanner, Date of birth 1955, Medical record number [id number]           Assessment and Recommendations:   Problem List:  AMS, resolved  -On the night of 5/24/23-5/25/23, he became altered. Occasionally seemed to have word finding difficulty. He also had mood and behavioral changes with more confusion.  -Repeat blood cx were obtained. UA was negative at admission and repeat is pending. Repeat CT head was negative. CXR was overall unremarkable. His amonia level was normal. EEG was negative.   -he came back to his normal neurological baseline on 5/26. While cause unclear, wth his rapid improvement following transition from cefepime to meropenem, (he essentially cleared his MS prior to clearing cefepime) its not clear if cefepime was contributing to his CNS issues.     Continued drainage from the LVAD exit site- cx obtained.   -the drive line had some clear drainage on exam. From the wife it sounds like its not much worse than usual.     LVAD History:  Current LVAD model: History of NICM s/p HM III  Date current LVAD placed: 2/18/20  Previous LVAD devices: none  Other prosthetic devices/materials:  ICD 3/16/20, Mems 8/2022  Primary cardiologist: Dr. Cisneros  Primary ID provider: Magy     History of bacteremias (dates and organisms):   --History of pre-op Proteus and Enterococcus bacteremia  --MSSA bacteremia secondary to MSSA driveline infection (11/2020).  Remains on cefadroxil or cephalexin for suppression.     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 Increased drainage and fluid collection in 12/2022 managed with I&D and course of daptomycin. s/p I&D of fluid collection on 12/11/22; Cx +MSSA (tetra-R). 12/7 outside culture with both MRSA and MSSA. Attempted to transition to Bactrim  suppression in 3/2023 but developed BERNARDA and was started on  Daptomycin from the 12/2022 admission. Culture 3/2/23 with MSSA and Pseudomonas x2 (pan-S) and MSSA x2 strains (both R: tetracycline; 1 with high daptomycin DONTA). Ciprofloxacin was added due to increased drainage. 3/24 cultures with Pseudomonas x3 strains (all FQ resistant). Although cipro resistant pseudomonas he clinically improved so cipro treatment was continued. CT (4/23/23) with soft tissue thickening along driveline but no abscess. 5/11/23 driveline exit site grew 2 strains of PsA that are resistance to fluoroquinolones so was placed on cefepime and continued on dapto. Repeat cultures on 5/11/23 grew PsA only so on 5/16/23 stopped dapto. He was continued on cefepime monotherapy as it also covers MSSA and no recent cultures grew MRSA. Per nursing he has more drainage from the exit site and with concern for cefepime induced CNS toxicity, he is now on  Meropenem. Today we obtained cultures from LVAD exit site.      History of other pertinent infections:   --Hx severe Legionella pneumonia (serogroup 1L) c/b cardiogenic shock, renal failure requiring CRRT, and resp failure requiring intubation, s/p azithromycin     History of driveline area irritation and current mitigation strategies:  local wound care center using vashe nzt 5.5 wound solution - soaking a hydrofera blue sponge and covering with mepilex. Changing dressing daily.   Current suppressive antibiotics: cefepime      Discussion:  67 year old LVAD hx of MSSA and PsA LVAD exit site infection on cefepime who was admitted for concern for GI bleed (EGD done and inflamed polyp possible source) 5/24-5/25 night he was confused and ID was consulted for possible cefepime toxicity.    The pt is now on chepe (cefepime stopped on 5/25/23) as there was a concern for cefepime neurotoxicity and continued drainage from the LVAD site (per nursing on 5/25/23). Continued drainage on cefepime does raise concern for  resistance. Its not clear to me if the drainage is actually worse than his baseline after talking to the wife. Today, he looks much better and is back to baseline neuro status. With such a rapid improvement of his neurological status, its unlikely that he had cefepime induced neurotoxicity and was probably experiencing delirium.     Other infectious work up with CXR, UA and blood Cx are unremarkable to date. Was able to obtain a cx from the LVAD exit site. If the pt were to grow PsA which is susceptible to cefepime/ceftaz, will consider deescalating from chepe to ceftazidime or cefepime. We can rechallenge with cefepime or switch to ceftazidime.  Ceftazidime has a lower risk of CNS neurotoxicity but has no MSSA coverage so he will need to be restarted on dapto. Pip/tazo is being avoided as it has high salt content and can make his heart failure worse.    Recommendations:  -continue  Meropenem (renal dosing) if cultures finalize prior to discharge I would not object to his leaving the hospital on meropenem (currently dosed BID) with a plan to follow cultures as an outpatient.  -f/p pending cx of the LVAD exit site for bacterial stain/culture if there is drainage. Please take a picture at the each wound dressing chage.   - F/u pending blood cx.   -will consider workup for phage therapy as outpatient.     Recommendations discussed with primary team.  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 He was on cefepime/dapto until 5/16/23 when he was switched to cefepime monotherapy as he failed to grow MRSA in several recent  "cultures and consistently grew PsA. Also recently diagnosed with stage IV cirrhosis via liver biopsy. He presented on 5/19/23 to ED after recurrent falls at home and anemia.     He was recently admitted and discharged on 5/3/23. Since being home, he had generalized fatigue and lightheadedness with standing and as a result has recurrent falls. He never lost LOC. At home he contied to have weight gain. No LVAD issues or low flow alarms. His Hb at admission was 7.2 baseline 9-10s. He had a EGD and colonoscopy on 5/22/23 . EGD showed Three gastric inflammatory polyps seen on exam which was biopsied. These were without sign of active bleeding, but could be a contributing cause of patient anemia. colonoscopy showed polyps which were biopsied as well. CT head without intracranial bleed.    Overnight, he became altered. Occasionally seems to have word finding difficulty that resolves per nursing notes. He also had mood and behavioral changes. Repeat blood cx were obtained. UA was negative at admission. Repeat CT head was negative. CXR was overall unremarkable. His amonia level was normal. Given that he is on cefepime, ID was consulted for recommendations. Unable to obtain a social history given his confusion.         Interval history:     The had no acute events overnight.  He allie any fevers, chills, sob, chest pain, abd pain, n/v/d at this time.          Physical Exam:   /84 (BP Location: Left arm)   Pulse 109   Temp 98.7  F (37.1  C) (Oral)   Resp 18   Ht 1.702 m (5' 7\")   Wt 83.3 kg (183 lb 10.3 oz)   SpO2 99%   BMI 28.76 kg/m         Exam:  GENERAL:  Well-developed, well-nourished, not in acute distress.   HEAD: Normocephalic and atraumatic  ENT:  No hearing impairment, no ear pain or exudate. nose and mouth without ulcers or lesions  EYES:  Eyes grossly normal to inspection, PERRL and conjunctivae and sclerae normal   NECK:  Supple, no adenopathy, no asymmetry  LUNGS:  Clear to auscultation - no rales, " rhonchi or wheezes  CARDIOVASCULAR:  VAD sounds.   ABDOMEN:  Soft, nontender, no hepatosplenomegaly, no masses and bowel sounds normal. Wound overall looking good. Some drainage is noted which was clear with yellow tint.    EXT: Extremities warm and without edema.  MS: No gross musculoskeletal defects noted, minimal edema  SKIN:  No acute rashes or suspicious lesions. PICC good position in right arm  NEUROLOGIC:  Grossly nonfocal.  PSYCHIATRIC: Mood stable, mentation appears normal, affect normal  HEMATOLOGIC/LYMPHATIC: No lymphadenopathy or bleeding           Laboratory Data:     Creatinine   Date Value Ref Range Status   05/28/2023 1.48 (H) 0.67 - 1.17 mg/dL Final   05/27/2023 1.70 (H) 0.67 - 1.17 mg/dL Final   05/27/2023 1.79 (H) 0.67 - 1.17 mg/dL Final   05/26/2023 1.74 (H) 0.67 - 1.17 mg/dL Final   05/26/2023 1.78 (H) 0.67 - 1.17 mg/dL Final   04/09/2021 1.6 mg/dL Final   03/19/2021 2.1 mg/dL Final   03/17/2021 2.00 (H) 0.66 - 1.25 mg/dL Final   03/16/2021 2.18 (H) 0.66 - 1.25 mg/dL Final   03/15/2021 2.31 (H) 0.66 - 1.25 mg/dL Final     WBC   Date Value Ref Range Status   03/19/2021 7.9 10^9/L Final   03/17/2021 6.6 4.0 - 11.0 10e9/L Final   03/16/2021 6.5 4.0 - 11.0 10e9/L Final   03/15/2021 6.3 4.0 - 11.0 10e9/L Final   03/14/2021 5.9 4.0 - 11.0 10e9/L Final     WBC Count   Date Value Ref Range Status   05/28/2023 6.6 4.0 - 11.0 10e3/uL Final   05/27/2023 10.0 4.0 - 11.0 10e3/uL Final   05/26/2023 11.9 (H) 4.0 - 11.0 10e3/uL Final   05/26/2023 10.0 4.0 - 11.0 10e3/uL Final   05/25/2023 10.0 4.0 - 11.0 10e3/uL Final     Hemoglobin   Date Value Ref Range Status   05/28/2023 8.3 (L) 13.3 - 17.7 g/dL Final   03/19/2021 10.1 (A) 13.3 - 17.7 g/dL Final     Platelet Count   Date Value Ref Range Status   05/28/2023 201 150 - 450 10e3/uL Final   03/19/2021 351 150 - 450 10^9/L Final   03/17/2021 268 150 - 450 10e9/L Final     Lab Results   Component Value Date     (L) 05/28/2023    BUN 33.8 (H) 05/28/2023     CO2 23 05/28/2023     CRP Inflammation   Date Value Ref Range Status   08/17/2021 4.9 0.0 - 8.0 mg/L Final   02/16/2021 270.0 (H) 0.0 - 8.0 mg/L Final   02/15/2021 270.0 (H) 0.0 - 8.0 mg/L Final   02/11/2021 8.6 0.0 - 10.0 mg/L Final   12/17/2020 8.0 0.0 - 8.0 mg/L Final   12/14/2020 6.1 0.0 - 10.0 mg/L Final           Pertinent Recent Microbiology Data:   No results for input(s): CULT, SDES in the last 168 hours.         Imaging:     Recent Results (from the past 48 hour(s))   CT Head w/o Contrast    Narrative    CT HEAD W/O CONTRAST 5/25/2023 10:40 AM    History: altered mental status/confusion in LVAd patient, please r/o  bleed     Comparison: 5/20/2023    Technique: Using multidetector thin collimation helical acquisition  technique, axial, coronal and sagittal CT images from the skull base  to the vertex were obtained without intravenous contrast.   (topogram) image(s) also obtained and reviewed.    Findings: There is no intracranial hemorrhage, mass effect, or midline  shift. Gray/white matter differentiation in both cerebral hemispheres  is preserved. Basal ganglia calcifications similar to prior.  Ventricles are proportionate to the cerebral sulci. The basal cisterns  are clear.    The bony calvaria and the bones of the skull base are normal. The  visualized portions of the paranasal sinuses and mastoid air cells are  clear.       Impression    Impression:  No acute intracranial pathology.     I have personally reviewed the examination and initial interpretation  and I agree with the findings.    KATHY SEAMAN MD         SYSTEM ID:  [id number]   XR Chest Port 1 View    Narrative    Portable chest    INDICATION: Acute medical syndrome rule out aspiration    COMPARISON: 5/20/2023    FINDINGS: Heart size upper normal. Median sternotomy, left subclavian  transvenous approach implantable cardiac defibrillator and LVAD again  present. No new consolidative opacities. Mild central perihilar patchy  densities  unchanged. High riding bilateral humeral heads may indicate  supraspinatus tendon degeneration at both shoulders.      Impression    IMPRESSION: No significant changes with perhaps minimal central  edema/atelectasis without definite aspiration. No consolidation. LVAD.  Implantable cardiac defibrillator.    AIME GAY MD         SYSTEM ID:  [id number]

## 2023-05-29 NOTE — PROVIDER NOTIFICATION
Time of notification: 6:08 AM  Provider notified: Cardiology Overnight  Patient status: Pt still sitting side of bed, has been awake all night & not wanting to get into bed to try & sleep. Says feeling off, with some brain fog. Not sure if its anxiety. Wanted to pass info along.     Orders received: None at this time. Will endorse to day shift.

## 2023-05-29 NOTE — PROVIDER NOTIFICATION
Time of notification: 1:10 AM  Provider notified: Cardiology Overnight  Patient status: Patient stated feeling some increased brain fog/confusion today. He wasn't able to get much sleep the previous night d/t being on Bumex gtt and tonight he hasn't been wanting to try to lay down in bed to go to sleep.      Orders received: none at this time. Try to turn lights out, tv off so patient can try to get some sleep. Patient ended up turning lights back on and kept tv on and didn't want to get into bed to try to get any sleep.

## 2023-05-29 NOTE — PLAN OF CARE
Neuro: A&Ox4. Tremors-baseline, [place]  Cardiac: SR/ST low 100s. VSS. MAP 70-80s    Respiratory: Sating 90s on RA. CPAP @ NOC  GI/: Adequate urine output. BM 5/29  Diet/appetite: Tolerating 2g Na, 2L FR diet. Eating well  Activity:  Assist of X1 w/gait belt and walker, up to chair and in halls.  Pain: At acceptable level on current regimen.   Skin: No new deficits noted. Dry jolene/scattered bruising. BUE foam patch on skin tears  LDA's: R PICC, R PIV    Plan: Continue with POC. Notify primary team with changes. No LVAD alarms today.

## 2023-05-29 NOTE — PLAN OF CARE
Hours of Care: 0212-1216    Neuro: A&Ox4, answers all orientation questions correctly. Pt has been c/o brain fog/confusion since during day shift, provider notified 2 times during shift. No acute changes during shift, neuro assessment intact, no deficits noted during shift. See provider notification notes. Pt did not sleep overnight.   Cardiac: ST. VSS. MAP 78-82 LVAD HM3  Respiratory: Sating >92% on RA.  GI/: Adequate urine output. Using urinal bedside. No BM during shift.   Diet/appetite: Tolerating low Na, 2L FR diet.  Activity:  Assist x1 up to commode, chair, and in halls.  Pain: At acceptable level on current regimen. Denies during shift.   Skin: No new deficits noted.  LDA's: R single lumen PICC SL, R PIV SL    Plan: Continue to monitor and follow POC. Notify primary team with changes.

## 2023-05-29 NOTE — PROGRESS NOTES
[facility]   Cardiology II Service / Advanced Heart Failure  Daily Progress Note      Patient: Eliseo Tanner  MRN: [id number]  Admission Date: 5/19/2023  Hospital Day # 10    Assessment and Plan: Eliseo Tanner is a 67 year old male with chronic systolic heart failure secondary to NICM (Stage D, NYHA Class IIIA) s/p HM 3 (2/18/2020), moderate CAD, HTN, ABHINAV on CPAP, DM2, CKD Stage III, ANA. 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 He was admitted on 5/20/23 for acute on chronic anemia associated with recurrent falls, lightheadedness, and hypotension.    Today's Plan:    -IV bumex x1 5 mg and Torsemide   -Psych consult   -CardioMems -46/19/32      # Acute on chronic systolic heart failure/HFrEF secondary to NICM  # Acute on chronic RV failure  # s/p HM3 LVAD  Stage D. NYHA Class IIIB.  Echo 4/28/23 septum normal, AV opening with each systole, and LVIDd 5.4 cm. RHC during last admit at 5800 rpm 4/28/23 mRA-12, mPCWP-12, JOSE Co-7, JOSE CI-3.6-note with drop in LVAD speed no significant improvement in RA with increase in wedge, weight 178 lb that day. Patient was diuresed with IV diuretics, didn't get back to EDW at time of discharge because pt wanted to go home early.  Last discharge on 5/3 weight 188 lb.   -Fluid status: near euvolemic, cardiomems pending  -Diuresis:Bumex gtt discontinued on 5/28   PTA torsemide 100 mg TID and IV bumex three times weekly and PTA hydrochlorothiazide 100 mg daily.  -RV support: None. Attempted cilostazol previously but discontinued due to epistaxis.   -ACEi/ARB/ARNi: contraindicated due to renal dysfunction  -BB: contraindicated due to RV failure  -Aldosterone antagonist: contraindicated due to renal dysfunction   -SGLT2i: Hold PTA jardiance in setting of hypotension  -Afterload  reduction: Holding PTA hydralazine 50 mg tid, imdur 120 mg daily, and amlodipine 10 mg daily in setting of hypotension. IF having more MAPs >85, will consider a low dose of amlodipine as first addition back  -SCD prophylaxis: ICD  -LDH trends: 300s, stable   -Anticoagulation: holding warfarin (see above), INR goal 1.7-2.3 due to epistaxis  -Antiplatelet: holding ASA 81 mg daily, do not plan to resume (see below)    # Acute on chronic anemia  # Chronic BELA  Hgb 6.7 on admit from baseline 9-10, had been drifting as an outpatient. No signs of overt bleeding. Hypotensive on admit (MAP 59) and has positional lightheadedness. Lactate 2.1 on admit. Unclear source for worsened anemia. He did have multiple lacerations/hematomas on arms from falls which could be contributing. There was no evidence of acute GI bleed given lack of hematochezia/melena or diarrhea, however, he does have known BELA and hasn't been scoped per review of our records. No evidence of RP bleed on exam. Hemolysis in setting of LVAD, infection, etc is also less likely. Patient with EGD and Colonoscopy on 2/22, multiple inflammatory polyps were identified, a number were removed and biopsied, one appeared to be a possible bleeding source and after removal site was clipped x3. There are some remaining gastric polyps, but per GI, no plan for removal unless further bleeding. Biopsies were notable for inflammatory polyps in the the stomach and duodenum and tubular adenomas in the colon.  - Appreciate GI consult  - Daily hgb  - Goal Hgb > 7, last transfusion 5/20  - Resuming coumadin, no bridge  - Stopped PTA ASA  - BID po protonix 40 mg BID (in place of PTA omeprazole)  - Iron labs: iron 26, TSAT 10, will start IV iron course, venofer 300 mg q72 hours x3, dose #1 on 5/28  - No need for repeat EGD as outpatient per advanced endoscopy unless further bleeding, H. Pylori  - Repeat colonoscopy in 3 years    # Altered Mental Status, resolved. Patient with behavorial  and mood changes overnight 5/24-5/25, not sleeping well, fixated on ambiguous tasks, etc. He did have more of a stutter, but no word-finding. Cranial nerves 2-12 intact. Symmetric. 5/5 upper and lower extremity strength. No asterixis. No pronator drift. Broad differential, but stoke appears unlikely given his reassuring neurologic exam. Considering metabolic encephalopathy, delerium, etc.  Head CT negative. Thus far infectious work up negative. Ammonia WNL and no asterixis. EEG negative. Returned to baseline afterr 24 hours. Initially thought to be cefepime, so this was stopped, but improved quicker than would be expected (before he missed a dose).  - Blood cultures NGTD  - Delerium precautions  - Seroquel PRN at bedtime  - Stopped PTA ambien, no plans to resume, okay per patient and wife  -Psych consult on 529 given persistent brain fog and problems with clarity of thinking    # Falls, generalized fatigue  Pt with 2 weeks of generalized fatigue, positional lightheadedness associated with frequent falls. Mildly hypotensive on admission. Symptoms likely 2/2 orthostasis in setting of acute on chronic anemia. CT head without intracranial bleed  - Treating anemia, volume, and BPs as above  - PT/OT, current recommendation is TCU, family may decline     # BERNARDA on CKD Stage III, stable  # Diuretic induced hypokalemia  BERNARDA during recent admission, Cr was 2.59 at discharge and admitted at 2.34. Kidney injury likely multifactorial but primary  at this time is likely pre-renal from hypotension related to acute on chronic anemia.  - Cr 1.48, trend BMP  - K >4, Mg >2, on protocol     # Chronic hyponatremia  Na fluctuates, has been in 127-132 range for past month. Admitted at 127. Unlikely a  of pt's fatigue/falls given chronicity.  - Na 134, trend BMP daily     # Chronic LVAD drive line infection, MSSA and PSA.   Follows with LVAD ID, had recently been switched from cipro and daptomycin to cefepime on 5/16 d/t culture  growing quinolone resistant PSA and no MSSA. However, cefepime stopped on 5/25 d/t confusion. He also had increased drainage (clear, but increased in volume). He was switched to meropenum.  - Appreciate ID consult  - F/up wound culture (5/26), pending  - Started meropenum 500 mg BID (renally dosed)  - Abx plan for discharge is pending  - Patient does not have home IV abx coverage, gets his antibiotics daily at his local hospital- per wife and pt, they are able to handle twice a day IV antibiotics in this fashion   - Outpatient follow-up with LVAD ID (virtual mid-June)     # ?Recently diagnosed cirrhosis  Mildly elevated LFTs, stable. Had evaluation including liver biopsy during last admission which showed cirrhosis - likely multifactorial (remote EtOH misuse, NAFLD, chronic congestive hepatopathy from RV failure). No evidence of decompensation.  - CT A/P (5/20) with stable small volume ascites and mild mesenteric/retroperitoneal edema/cholelithiasis   - Needs outpatient follow-up with hepatology    MELD-Na score: 24 at 5/29/2023  5:34 AM  MELD score: 20 at 5/29/2023  5:34 AM  Calculated from:  Serum Creatinine: 1.92 mg/dL at 5/29/2023  5:34 AM  Serum Sodium: 131 mmol/L at 5/29/2023  5:34 AM  Total Bilirubin: 0.6 mg/dL (Using min of 1 mg/dL) at 5/29/2023  5:34 AM  INR(ratio): 1.89 at 5/29/2023  5:34 AM  Age: 67 years     # Hypothyroidism.   - Levothyroxine to 100 mcg daily (increased 5/2), repeat TSH 8.09/T4 0.97 on 5/21  - Repeat TSH ~6/15/23    # Tremor, longstanding, intermittent, happens both at rest and with activity but not consistently. No asterixis. No intention tremor on exam. No resting tremor on exam.   - Neurology consult signed off  - Would f/up without patient neurology (needs referal on discharge to local/[facility] neurology)    # Hypocalcermia, improved.   - Corrected Ca about 8.6, no indications for replacement currently  - Keep magnesium >2.2  - If corrected CA <8.5, would plan for PTH, vitamin D  "level and replacement with calcium citrate (better absorbed when on PPI)     # Tropinemia, improved   Troponin 155 on admit, increased from recent baseline. No chest pain or anginal equivalents. EKG without acute ST/T changes. Most likely demand ischemia in setting of acute on chronic anemia and hypotension.  - 155->148  - No further trending indicated     Chronic Medical Conditions:   BPH. Continue PTA meds.   Gout. Continue Allopurinol.    DM Type II, controlled.   Mood Disorder. Continue Paxil.   ABHINAV: CPAP      Diet: Fluid restriction 2000 ML FLUID, Clears  DVT Prophylaxis: Pneumatic Compression Devices, coumadin  Guevara Catheter: Not present  Cardiac Monitoring: Telemetry   Code Status: Full Code    ================================================================    Subjective/24-Hr Events:   Nursing notes reviewed.  No acute events overnight.  Still feels like he is having some confusion and brain fog.  He states he is not able to think clearly.  Denies pain on any parts of the body.  Discussed with him about plans for the day.     ROS:  4 point ROS including respiratory, CV, GI and  (other than that noted in the HPI) is negative.     Medications: Reviewed in EPIC.     Physical Exam:   /84 (BP Location: Left arm)   Pulse 102   Temp 98.2  F (36.8  C) (Oral)   Resp 17   Ht 1.702 m (5' 7\")   Wt 83.3 kg (183 lb 10.3 oz)   SpO2 95%   BMI 28.76 kg/m      GENERAL: Appears comfortable, in no distress.  HEENT: Eye symmetrical, no discharge or icterus bilaterall  NECK: Supple, JVD middle third of neck at 90 degrees  CV: + LVAD hum   RESPIRATORY: Respirations regular, even, and unlabored. Lungs CTA throughout.    GI: Soft and non distended with normoactive bowel sounds present in all quadrants. No tenderness, rebound, guarding.   EXTREMITIES: No peripheral edema. All extremities are warm and well perfused.  NEUROLOGIC: Alert and interacting appropriately. Cranial nerves 2-12 intact. Symmetric. 5/5 upper and " lower extremity strength. No asterixis. No pronator drift.    MUSCULOSKELETAL: No joint swelling or tenderness.   SKIN: No jaundice. No rashes or lesions.     Labs:  CMP  Recent Labs   Lab 05/29/23 0534 05/28/23 2012 05/28/23  1624 05/28/23  1142 05/28/23  0646 05/27/23  1609 05/25/23  1546 05/25/23  1059   *  --  127*  --  134* 141   < > 134*   POTASSIUM 5.1 4.9 4.9 3.8 2.9* 3.6   < > 3.6  3.6  3.6   CHLORIDE 98  --  95*  --  98 107   < > 99   CO2 19*  --  20*  --  23 22   < > 23   ANIONGAP 14  --  12  --  13 12   < > 12   *  --  204*  --  103* 161*   < > 145*   BUN 38.2*  --  35.2*  --  33.8* 37.6*   < > 44.3*   CR 1.92*  --  1.73*  --  1.48* 1.70*   < > 1.73*   GFRESTIMATED 37*  --  42*  --  51* 43*   < > 42*   CALOS 8.6*  --  8.0*  --  7.9* 7.7*   < > 7.8*   MAG 1.7  --  1.6*  --  1.8 2.3   < >  --    PROTTOTAL 7.7  --   --   --   --   --   --  6.8   ALBUMIN 3.7  --   --   --   --   --   --  3.1*   BILITOTAL 0.6  --   --   --   --   --   --  0.6   ALKPHOS 170*  --   --   --   --   --   --  140*   AST 69*  --   --   --   --   --   --  66*   ALT 50  --   --   --   --   --   --  52*    < > = values in this interval not displayed.       CBC  Recent Labs   Lab 05/29/23 0534 05/28/23  0646 05/27/23  0654 05/26/23  1602   WBC 10.2 6.6 10.0 11.9*   RBC 3.29* 2.93* 2.87* 2.98*   HGB 9.1* 8.3* 8.3* 8.6*   HCT 30.6* 28.0* 28.4* 31.1*   MCV 93 96 99 104*   MCH 27.7 28.3 28.9 28.9   MCHC 29.7* 29.6* 29.2* 27.7*   RDW 17.9* 17.8* 18.1* 18.3*    201 186 190       INR  Recent Labs   Lab 05/29/23  0534 05/28/23  0646 05/27/23  0654 05/26/23  0400   INR 1.89* 2.03* 1.90* 1.51*       Patient discussed with Dr. Tomas.      Gardenia Husain MD  Internal Medicine Resident

## 2023-05-29 NOTE — CONSULTS
"      Initial Psychiatric Consult   Consult date: May 29, 2023         Reason for Consult, requesting source:    Brain fog, PTA Paxil   Requesting source: Cards    Labs and imaging reviewed. Patient seen and evaluated by JUAN Hartman CNP          HPI:   Eliseo Tanner is a 67 year old male with chronic systolic heart failure secondary to NICM (Stage D, NYHA Class IIIA) s/p HM 3 (2/18/2020), moderate CAD, HTN, ABHINAV on CPAP, DM2, CKD Stage III, ANA. 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 He was admitted on 5/20/23 for acute on chronic anemia associated with recurrent falls, lightheadedness, and hypotension. During hospitalization, Patient with behavorial and mood changes overnight 5/24-5/25, not sleeping well, fixated on ambiguous tasks, etc. He did have more of a stutter, but no word-finding. Returned to baseline afterr 24 hours. Initially thought to be cefepime, so this was stopped, but improved quicker than would be expected (before he missed a dose). Delirium precautions were started, Seroquel PRN at bedtime, and stopped PTA ambien with no plans to resume.     Today patient reports being in a \"fine\" mood and reports being on paxil for \"some time\" because of anxiety which happens infrequently. His PCP has been managing all embraase meds and he states he was on a different medication for anxiety prior to paxil but was switched to paxil. He thinks both meds worked okay. He denies feeling depressed, sad, or anxious during this hospitalization. He reports he had some confusion and they thought he had a stroke but it turned out to be okay. Prior to admission no confusion/brain fog. He states last night he had some brain fog stating he just couldn't remember what he needed to do. Today he was alert and oriented x4.         " Past Psychiatric History:   Pt denies formal diagnosis of BEATRIZ or any other psych disorder.   Denies hospitalization, suicide attempts, winnie, or psychosis.   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)     MSSA     Legionella pneumonia (H)      LVAD (left ventricular assist device) present (H)      MSSA bacteremia 11/2020       PAST SURGICAL HISTORY:   Past Surgical History:   Procedure Laterality Date     ANESTHESIA CARDIOVERSION N/A 02/28/2020    Procedure: ANESTHESIA, FOR CARDIOVERSION;  Surgeon: GENERIC ANESTHESIA PROVIDER;  Location: [facility] OR     COLONOSCOPY N/A 5/22/2023    Procedure: COLONOSCOPY, WITH POLYPECTOMY AND BIOPSY;  Surgeon: Myles Mota DO;  Location: [facility] GI     CV CARDIOMEMS WITH RIGHT HEART CATH N/A 09/20/2022    Procedure: Pulmonary Arterial Pressure Sensor Placement CPT Codes to be cleared by financial securing for this implant. [id number] and ;  Surgeon: Dalton Baeza MD;  Location:  HEART CARDIAC CATH LAB     CV CENTRAL VENOUS CATHETER PLACEMENT N/A 02/13/2020    Procedure: Central Venous Catheter Placement;  Surgeon: Chente Moss MD;  Location:  HEART CARD[facility]AC CATH LAB     CV INTRA AORTIC BALLOON N/A 02/07/2020    Procedure: Intra-Aortic Balloon Pump Insertion;  Surgeon: Jose Baldwin MD;  [facility]     CV INTRA AORTIC BALLOON N/A 02/13/2020    Procedure: Intra-Aortic Balloon Pump Insertion;  Surgeon: Chente Moss MD;  Location:  HEART CARDIAC CATH LAB     CV RIGHT HEART CATH MEASUREMENTS RECORDED N/A 09/21/2020    Procedure: CV  RIGHT HEART CATH;  Surgeon: Dalton Baeza MD;  Location:  HEART CARDIAC CATH LAB     CV RIGHT HEART CATH MEASUREMENTS RECORDED N/A 01/07/2021    Procedure: Right Heart Cath;  Surgeon: Chun Ball MD;  Location:  HEART CARDIAC CATH LAB     CV RIGHT HEART CATH MEASUREMENTS RECORDED N/A 02/10/2022    Procedure: CV RIGHT HEART CATH;  Surgeon: Dalton Baeza MD;  Location:  HEART CARDIAC CATH LAB     CV RIGHT HEART CATH MEASUREMENTS RECORDED N/A 09/20/2022    Procedure: Right Heart Catheterization [[id number]];  Surgeon: Dalton Baeza MD;  Location:  HEART CARDIAC CATH LAB     CV RIGHT HEART CATH MEASUREMENTS RECORDED N/A 12/12/2022    Procedure: Right Heart Cath;  Surgeon: Chente Moss MD;  Location:  HEART CARDIAC CATH LAB     CV RIGHT HEART CATH MEASUREMENTS RECORDED N/A 4/28/2023    Procedure: Right Heart Catheterization;  Surgeon: Aaliyah Fischer MD;  Location:  HE[facility] CARD[facility]AC CATH LAB     CV SWAN LUCIANA PROCEDURE N/A 02/13/2020    Procedure: [place] Luc[place] Procedure;  Surgeon: Chente Moss MD;  Location:  HEART CARDIAC CATH LAB     EP ICD Bilateral 03/16/2020    Procedure: EP ICD;  Surgeon: Dali Day MD;  Location:  HEART CARDIAC CATH LAB     ESOPHAGOSCOPY, GASTROSCOPY, DUODENOSCOPY (EGD), COMBINED N/A 5/22/2023    Procedure: ESOPHAGOGASTRODUODENOSCOPY, WITH BIOPSY;  Surgeon: Myles Mota DO;  Location:  GI     INCISION AND DRAINAGE CHEST WASHOUT, COMBINED N/A 12/11/2022    Procedure: INCISION AND DRAINAGE OF DRIVELINE;  Surgeon: Mac Jaramillo MD;  Location: [facility]     INSERT VENTRICULAR ASSIST DEVICE LEFT (HEARTMATE II) N/A 02/18/2020    Procedure: INSERTION, LEFT VENTRICULAR ASSIST DEVICE (HEARTMATE III);  Surgeon: Mac Jaramillo MD;  Location: [facility] OR     IR CVC TUNNEL PLACEMENT > 5 YRS OF AGE  02/23/2021     IR CVC TUNNEL REMOVAL RIGHT  03/16/2021     IR TRANSCATHETER BIOPSY  5/2/2023     MIDLINE INSERTION - DOUBLE  LUMEN Left 12/15/2022    left basilic 5 fr dl midline 20 cm     THORACOSCOPY Right 2020    Procedure: RIGHT VIDEO-ASSISTED THORASCOPIC SURGERY, EVACUATION OF HEMOTHORAX, PLACEMENT OF CHEST TUBES;  Surgeon: William Gan MD;  Location:  OR             Family History:   FAMILY HISTORY: No family history on file.    Family Psychiatric History: unknown         Social History:   SOCIAL HISTORY:   Social History     Tobacco Use     Smoking status: Former     Types: Cigarettes     Quit date: 1994     Years since quittin.1     Smokeless tobacco: Never   Vaping Use     Vaping status: Never Used   Substance Use Topics     Alcohol use: Not Currently              Physical ROS:   The 10 point Review of Systems is negative other than noted in the HPI or here.           Medications:       allopurinol  200 mg Oral Daily     amiodarone  200 mg Oral Daily     bumetanide  5 mg Intravenous Once     bumetanide  5 mg Intravenous Q Mon Wed Fri AM     diclofenac  2 g Topical 4x Daily     finasteride  5 mg Oral Daily     hydrochlorothiazide  100 mg Oral Daily     iron sucrose  300 mg Intravenous Q72H     levothyroxine  100 mcg Oral QAM AC     magnesium oxide  400 mg Oral Q4H     [Held by provider] magnesium oxide  400 mg Oral BID     meropenem  500 mg Intravenous Q12H     pantoprazole  40 mg Oral BID AC     PARoxetine  20 mg Oral Daily     potassium chloride  80 mEq Oral BID     tamsulosin  0.8 mg Oral QPM     torsemide  100 mg Oral TID     [Held by provider] torsemide  100 mg Oral TID     warfarin ANTICOAGULANT  3 mg Oral ONCE at 18:00     Warfarin Therapy Reminder  1 each Oral See Admin Instructions              Allergies:     Allergies   Allergen Reactions     Heparin      HIT screen positive 20, reflex DAVINA negative; however heme recommended treating as if positive  HIT screen negative 20     Chlorhexidine Rash     Oxycodone Other (See Comments) and Itching          Labs:     Recent Results (from the past 48  hour(s))   Magnesium    Collection Time: 05/27/23  1:47 PM   Result Value Ref Range    Magnesium 2.4 (H) 1.7 - 2.3 mg/dL   Wound Aerobic Bacterial Culture Routine    Collection Time: 05/27/23  3:16 PM    Specimen: Abdomen; Wound   Result Value Ref Range    Culture No Growth    Basic metabolic panel    Collection Time: 05/27/23  4:09 PM   Result Value Ref Range    Sodium 141 136 - 145 mmol/L    Potassium 3.6 3.4 - 5.3 mmol/L    Chloride 107 98 - 107 mmol/L    Carbon Dioxide (CO2) 22 22 - 29 mmol/L    Anion Gap 12 7 - 15 mmol/L    Urea Nitrogen 37.6 (H) 8.0 - 23.0 mg/dL    Creatinine 1.70 (H) 0.67 - 1.17 mg/dL    Calcium 7.7 (L) 8.8 - 10.2 mg/dL    Glucose 161 (H) 70 - 99 mg/dL    GFR Estimate 43 (L) >60 mL/min/1.73m2   Magnesium    Collection Time: 05/27/23  4:09 PM   Result Value Ref Range    Magnesium 2.3 1.7 - 2.3 mg/dL   INR    Collection Time: 05/28/23  6:46 AM   Result Value Ref Range    INR 2.03 (H) 0.85 - 1.15   Basic metabolic panel    Collection Time: 05/28/23  6:46 AM   Result Value Ref Range    Sodium 134 (L) 136 - 145 mmol/L    Potassium 2.9 (L) 3.4 - 5.3 mmol/L    Chloride 98 98 - 107 mmol/L    Carbon Dioxide (CO2) 23 22 - 29 mmol/L    Anion Gap 13 7 - 15 mmol/L    Urea Nitrogen 33.8 (H) 8.0 - 23.0 mg/dL    Creatinine 1.48 (H) 0.67 - 1.17 mg/dL    Calcium 7.9 (L) 8.8 - 10.2 mg/dL    Glucose 103 (H) 70 - 99 mg/dL    GFR Estimate 51 (L) >60 mL/min/1.73m2   CBC with platelets    Collection Time: 05/28/23  6:46 AM   Result Value Ref Range    WBC Count 6.6 4.0 - 11.0 10e3/uL    RBC Count 2.93 (L) 4.40 - 5.90 10e6/uL    Hemoglobin 8.3 (L) 13.3 - 17.7 g/dL    Hematocrit 28.0 (L) 40.0 - 53.0 %    MCV 96 78 - 100 fL    MCH 28.3 26.5 - 33.0 pg    MCHC 29.6 (L) 31.5 - 36.5 g/dL    RDW 17.8 (H) 10.0 - 15.0 %    Platelet Count 201 150 - 450 10e3/uL   Magnesium    Collection Time: 05/28/23  6:46 AM   Result Value Ref Range    Magnesium 1.8 1.7 - 2.3 mg/dL   Lactate Dehydrogenase    Collection Time: 05/28/23  6:46 AM    Result Value Ref Range    Lactate Dehydrogenase 320 (H) 0 - 250 U/L   Potassium    Collection Time: 05/28/23 11:42 AM   Result Value Ref Range    Potassium 3.8 3.4 - 5.3 mmol/L   Basic metabolic panel    Collection Time: 05/28/23  4:24 PM   Result Value Ref Range    Sodium 127 (L) 136 - 145 mmol/L    Potassium 4.9 3.4 - 5.3 mmol/L    Chloride 95 (L) 98 - 107 mmol/L    Carbon Dioxide (CO2) 20 (L) 22 - 29 mmol/L    Anion Gap 12 7 - 15 mmol/L    Urea Nitrogen 35.2 (H) 8.0 - 23.0 mg/dL    Creatinine 1.73 (H) 0.67 - 1.17 mg/dL    Calcium 8.0 (L) 8.8 - 10.2 mg/dL    Glucose 204 (H) 70 - 99 mg/dL    GFR Estimate 42 (L) >60 mL/min/1.73m2   Magnesium    Collection Time: 05/28/23  4:24 PM   Result Value Ref Range    Magnesium 1.6 (L) 1.7 - 2.3 mg/dL   Potassium    Collection Time: 05/28/23  8:12 PM   Result Value Ref Range    Potassium 4.9 3.4 - 5.3 mmol/L   INR    Collection Time: 05/29/23  5:34 AM   Result Value Ref Range    INR 1.89 (H) 0.85 - 1.15   Basic metabolic panel    Collection Time: 05/29/23  5:34 AM   Result Value Ref Range    Sodium 131 (L) 136 - 145 mmol/L    Potassium 5.1 3.4 - 5.3 mmol/L    Chloride 98 98 - 107 mmol/L    Carbon Dioxide (CO2) 19 (L) 22 - 29 mmol/L    Anion Gap 14 7 - 15 mmol/L    Urea Nitrogen 38.2 (H) 8.0 - 23.0 mg/dL    Creatinine 1.92 (H) 0.67 - 1.17 mg/dL    Calcium 8.6 (L) 8.8 - 10.2 mg/dL    Glucose 153 (H) 70 - 99 mg/dL    GFR Estimate 37 (L) >60 mL/min/1.73m2   CBC with platelets    Collection Time: 05/29/23  5:34 AM   Result Value Ref Range    WBC Count 10.2 4.0 - 11.0 10e3/uL    RBC Count 3.29 (L) 4.40 - 5.90 10e6/uL    Hemoglobin 9.1 (L) 13.3 - 17.7 g/dL    Hematocrit 30.6 (L) 40.0 - 53.0 %    MCV 93 78 - 100 fL    MCH 27.7 26.5 - 33.0 pg    MCHC 29.7 (L) 31.5 - 36.5 g/dL    RDW 17.9 (H) 10.0 - 15.0 %    Platelet Count 238 150 - 450 10e3/uL   Magnesium    Collection Time: 05/29/23  5:34 AM   Result Value Ref Range    Magnesium 1.7 1.7 - 2.3 mg/dL   Hepatic panel    Collection  "Time: 05/29/23  5:34 AM   Result Value Ref Range    Protein Total 7.7 6.4 - 8.3 g/dL    Albumin 3.7 3.5 - 5.2 g/dL    Bilirubin Total 0.6 <=1.2 mg/dL    Alkaline Phosphatase 170 (H) 40 - 129 U/L    AST 69 (H) 10 - 50 U/L    ALT 50 10 - 50 U/L    Bilirubin Direct 0.24 0.00 - 0.30 mg/dL   Lactate Dehydrogenase    Collection Time: 05/29/23  5:34 AM   Result Value Ref Range    Lactate Dehydrogenase 378 (H) 0 - 250 U/L   TSH with free T4 reflex    Collection Time: 05/29/23  5:34 AM   Result Value Ref Range    TSH 12.30 (H) 0.30 - 4.20 uIU/mL   T4 free    Collection Time: 05/29/23  5:34 AM   Result Value Ref Range    Free T4 1.48 0.90 - 1.70 ng/dL          Physical and Psychiatric Examination:     /84 (BP Location: Left arm)   Pulse 102   Temp 98.2  F (36.8  C) (Oral)   Resp 17   Ht 1.702 m (5' 7\")   Wt 83.3 kg (183 lb 10.3 oz)   SpO2 95%   BMI 28.76 kg/m    Weight is 183 lbs 10.29 oz  Body mass index is 28.76 kg/m .    Physical Exam:  I have reviewed the physical exam as documented by by the medical team and agree with findings and assessment and have no additional findings to add at this time.    Mental Status Exam:    Appearance: awake, alert, adequately groomed and dressed in hospital scrubs  Attitude:  cooperative  Eye Contact:  good  Mood:  good  Affect:  appropriate and in normal range and mood congruent  Speech:  clear, coherent  Language: Fluent in english   Psychomotor Behavior:  no evidence of tardive dyskinesia, dystonia, or tics  Thought Process:  logical, linear and goal oriented  Associations:  no loose associations  Thought Content:  no evidence of suicidal ideation or homicidal ideation and no evidence of psychotic thought  Insight:  fair  Judgement:  fair  Oriented to:  time, person, and place  Attention Span and Concentration:  intact  Recent and Remote Memory:  intact  Fund of Knowledge: Appropriate   Gait and Station: baseline                 DSM-5 Diagnosis:   Unspecified anxiety " disorder  Delirium, resolving          Assessment/Plan:   Eliseo is a 67 year old male who has been treated for anxiety with paxil per his PCP. Patient currently on Paxil 20mg daily, I do caution the combination of paxil exacerbating enhance the hyponatremic effect of diuretics with his sodium being low. He certainly does have multiple medical issues that are delirio genic and has been a chronic insomniac on ambien PTA. As his mood and anxiety is very well-controlled and doing well, I recommend decreasing Paxil to 10mg and scheduling Seroquel at bedtime for sleep.     1. Decrease Paxil to 10mg x2 doses then discontinue   2. Schedule Seroquel at bedtime for sleep   3. Delirium precautions:    Up during the day with lights on    Lights off at night, avoid interruptions during the night as much as possible    Family visits    Encourage wearing glasses    Reorientation    Avoid opioids, benzodiazepines, anticholinergics as much as possible.     Continue to ensure proper nutrition, fluid and electrolyte balance. Monitor for infections, hypoxia, metabolic derangements, or other causes of delirium.               Kari Donato, [id number]  Consult/Liaison Psychiatry   [facility]

## 2023-05-30 NOTE — PROGRESS NOTES
[facility]   Cardiology II Service / Advanced Heart Failure  Daily Progress Note      Patient: Eliseo Tanner  MRN: [id number]  Admission Date: 5/19/2023  Hospital Day # 11    Assessment and Plan: Eliseo Tanner is a 67 year old male with chronic systolic heart failure secondary to NICM (Stage D, NYHA Class IIIA) s/p HM 3 (2/18/2020), moderate CAD, HTN, ABHINAV on CPAP, DM2, CKD Stage III, ANA. 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 He was admitted on 5/20/23 for acute on chronic anemia associated with recurrent falls, lightheadedness, and hypotension.    Today's Plan:  -Continue home diruetic regimen- bumex 5 mg IV every M/W/F and torsemide 100 mg PO tid (w/KCL 80 mEq bid)  -Decrease HS Seroquel to 12.5 mg    -Per ID recs, stop meropenem and restart cefepime  -hepatology consult - r/o cirrhosis/liver dysfunction as contributing to AMS    # Acute on chronic systolic heart failure/HFrEF secondary to NICM  # Acute on chronic RV failure  # s/p HM3 LVAD  Stage D. NYHA Class IIIB.  Echo 4/28/23 septum normal, AV opening with each systole, and LVIDd 5.4 cm. RHC during last admit at 5800 rpm 4/28/23 mRA-12, mPCWP-12, JOSE Co-7, JOSE CI-3.6-note with drop in LVAD speed no significant improvement in RA with increase in wedge, weight 178 lb that day. Patient was diuresed with IV diuretics, didn't get back to EDW at time of discharge because pt wanted to go home early.  Last discharge on 5/3 weight 188 lb.     -Fluid status: near euvolemic, CardioMems -46/19/32  -Diuresis: Bumex gtt discontinued on 5/28, PTA torsemide 100 mg TID and IV bumex three times weekly and PTA hydrochlorothiazide 100 mg daily.  -RV support: None. Attempted cilostazol previously but discontinued due to epistaxis.   -ACEi/ARB/ARNi: contraindicated  due to renal dysfunction  -BB: contraindicated due to RV failure  -Aldosterone antagonist: contraindicated due to renal dysfunction   -SGLT2i: Hold PTA jardiance in setting of hypotension  -Afterload reduction: Holding PTA hydralazine 50 mg tid, imdur 120 mg daily, and amlodipine 10 mg daily in setting of hypotension. IF having more MAPs >85, will consider a low dose of amlodipine as first addition back  -SCD prophylaxis: ICD  -LDH trends: 300s, stable   -Anticoagulation: holding warfarin (see above), INR goal 1.7-2.3 due to epistaxis  -Antiplatelet: holding ASA 81 mg daily, do not plan to resume (see below)    # Acute on chronic anemia  # Chronic BELA  Hgb 6.7 on admit from baseline 9-10, had been drifting as an outpatient. No signs of overt bleeding. Hypotensive on admit (MAP 59) and has positional lightheadedness. Lactate 2.1 on admit. Unclear source for worsened anemia. He did have multiple lacerations/hematomas on arms from falls which could be contributing. There was no evidence of acute GI bleed given lack of hematochezia/melena or diarrhea, however, he does have known BELA and hasn't been scoped per review of our records. No evidence of RP bleed on exam. Hemolysis in setting of LVAD, infection, etc is also less likely. Patient with EGD and Colonoscopy on 2/22, multiple inflammatory polyps were identified, a number were removed and biopsied, one appeared to be a possible bleeding source and after removal site was clipped x3. There are some remaining gastric polyps, but per GI, no plan for removal unless further bleeding. Biopsies were notable for inflammatory polyps in the the stomach and duodenum and tubular adenomas in the colon.  - Appreciate GI consult  - Daily hgb  - Goal Hgb > 7, last transfusion 5/20  - Resuming coumadin, no bridge  - Stopped PTA ASA  - BID po protonix 40 mg BID (in place of PTA omeprazole)  - Iron labs: iron 26, TSAT 10, will start IV iron course, venofer 300 mg x3, dose #1 on 5/28  -  No need for repeat EGD as outpatient per advanced endoscopy unless further bleeding, H. Pylori  - Repeat colonoscopy in 3 years    # Altered Mental Status, resolved. Patient with behavorial and mood changes overnight 5/24-5/25, not sleeping well, fixated on ambiguous tasks, etc. He did have more of a stutter, but no word-finding. Cranial nerves 2-12 intact. Symmetric. 5/5 upper and lower extremity strength. No asterixis. No pronator drift. Broad differential, but stoke appears unlikely given his reassuring neurologic exam. Considering metabolic encephalopathy, delerium, etc. Head CT negative. Thus far infectious work up negative. Ammonia WNL and no asterixis. EEG negative. Returned to baseline afterr 24 hours. Initially thought to be cefepime, so this was stopped, but improved quicker than would be expected (before he missed a dose).  - Blood cultures NGTD  - Delerium precautions  - Seroquel PRN at bedtime--> decreased to 12.5 d/t AM drowsiness  - Stopped PTA ambien, no plans to resume, okay per patient and wife  - Psych consult on 529 given persistent brain fog and problems with clarity of thinking  - hepatology consulted    # Recently diagnosed cirrhosis  Mildly elevated LFTs, stable. Had evaluation including liver biopsy during last admission which showed cirrhosis - likely multifactorial (remote EtOH misuse, NAFLD, chronic congestive hepatopathy from RV failure). No evidence of decompensation. CT A/P (5/20) with stable small volume ascites and mild mesenteric/retroperitoneal edema/cholelithiasis   - hepatology consulted     # Falls, generalized fatigue  Pt with 2 weeks of generalized fatigue, positional lightheadedness associated with frequent falls. Mildly hypotensive on admission. Symptoms likely 2/2 orthostasis in setting of acute on chronic anemia. CT head without intracranial bleed  - Treating anemia, volume, and BPs as above  - PT/OT, current recommendation is TCU, family may decline     # BERNARDA on CKD Stage  III, stable  # Diuretic induced hypokalemia  BERNARDA during recent admission, Cr was 2.59 at discharge and admitted at 2.34. Kidney injury likely multifactorial but primary  at this time is likely pre-renal from hypotension related to acute on chronic anemia.  - Cr 1.48, trend BMP  - K >4, Mg >2, on protocol     # Chronic hyponatremia  Na fluctuates, has been in 127-132 range for past month. Admitted at 127. Unlikely a  of pt's fatigue/falls given chronicity.  - Na 136, trend BMP daily     # Chronic LVAD drive line infection, MSSA and PSA.   Follows with LVAD ID, had recently been switched from cipro and daptomycin to cefepime on 5/16 d/t culture growing quinolone resistant PSA and no MSSA. However, cefepime stopped on 5/25 d/t confusion. He also had increased drainage (clear, but increased in volume). He was switched to meropenum.  - Appreciate ID consult  - F/up wound culture (5/26), pending  - Started meropenum 500 mg BID (renally dosed)  - Abx plan for discharge is pending  - Patient does not have home IV abx coverage, gets his antibiotics daily at his local hospital- per wife and pt, they are able to handle twice a day IV antibiotics in this fashion   - Outpatient follow-up with LVAD ID (virtual mid-June)         MELD-Na score: 24 at 5/30/2023  5:09 AM  MELD score: 23 at 5/30/2023  5:09 AM  Calculated from:  Serum Creatinine: 2.15 mg/dL at 5/30/2023  5:09 AM  Serum Sodium: 136 mmol/L at 5/30/2023  5:09 AM  Total Bilirubin: 0.6 mg/dL (Using min of 1 mg/dL) at 5/29/2023  5:34 AM  INR(ratio): 2.22 at 5/30/2023  5:09 AM  Age: 67 years     # Hypothyroidism.   - Levothyroxine to 100 mcg daily (increased 5/2), repeat TSH 8.09/T4 0.97 on 5/21  - Repeat TSH ~6/15/23    # Tremor, longstanding, intermittent, happens both at rest and with activity but not consistently. No asterixis. No intention tremor on exam. No resting tremor on exam.   - Neurology consult signed off  - Would f/up without patient neurology  "(needs referal on discharge to local/[place] neurology)    # Hypocalcermia, improved.   - Corrected Ca about 8.6, no indications for replacement currently  - Keep magnesium >2.2  - If corrected CA <8.5, would plan for PTH, vitamin D level and replacement with calcium citrate (better absorbed when on PPI)     # Tropinemia, improved   Troponin 155 on admit, increased from recent baseline. No chest pain or anginal equivalents. EKG without acute ST/T changes. Most likely demand ischemia in setting of acute on chronic anemia and hypotension.  - 155->148  - No further trending indicated     Chronic Medical Conditions:   BPH. Continue PTA meds.   Gout. Continue Allopurinol.    DM Type II, controlled.   Mood Disorder. Continue Paxil.   ABHINAV: CPAP      Diet: Fluid restriction 2000 ML FLUID, Clears  DVT Prophylaxis: Pneumatic Compression Devices, coumadin  Guevara Catheter: Not present  Cardiac Monitoring: Telemetry   Code Status: Full Code      Negar New DNP Student  [facility]    I saw the patient with the student and I agree with student's documentation and findings. The note above reflects our joint assessment and plan.     Siena Aguiar DNP, NP-C, [facility]  5/30/2023      ================================================================    Subjective/24-Hr Events:   Nursing notes reviewed. No acute events overnight. Lethargic this AM thinks r/t HS Seroquel. Cognitive improved, still endorses brain fog. Denies pain. Discussed with him about plans for the day.     ROS:  4 point ROS including respiratory, CV, GI and  (other than that noted in the HPI) is negative.     Medications: Reviewed in EPIC.     Physical Exam:   BP (!) 75/62 (BP Location: Left arm)   Pulse 74   Temp 97.9  F (36.6  C) (Oral)   Resp 20   Ht 1.702 m (5' 7\")   Wt 82.9 kg (182 lb 12.2 oz)   SpO2 95%   BMI 28.62 kg/m      GENERAL: Appears comfortable, in no distress.  HEENT: Eye symmetrical, no discharge or icterus " bilaterall  NECK: Supple, JVD just above the clavicle at 30 degrees.  CV: + LVAD hum   RESPIRATORY: Respirations regular, even, and unlabored. Lungs CTA throughout.    GI: Soft and non distended with normoactive bowel sounds present in all quadrants. No tenderness, rebound, guarding.   EXTREMITIES: No peripheral edema. All extremities are warm and well perfused.  NEUROLOGIC: Lethargic and interacting appropriately. No asterixis.  MUSCULOSKELETAL: No joint swelling or tenderness.   SKIN: No jaundice. No rashes or lesions.     Labs:  CMP  Recent Labs   Lab 05/30/23  0509 05/29/23  1543 05/29/23  0534 05/28/23 2012 05/28/23  1624 05/25/23  1546 05/25/23  1059    132* 131*  --  127*   < > 134*   POTASSIUM 3.5 4.7 5.1 4.9 4.9   < > 3.6  3.6  3.6   CHLORIDE 101 102 98  --  95*   < > 99   CO2 22 18* 19*  --  20*   < > 23   ANIONGAP 13 12 14  --  12   < > 12   GLC 90 121* 153*  --  204*   < > 145*   BUN 42.1* 41.0* 38.2*  --  35.2*   < > 44.3*   CR 2.15* 2.13* 1.92*  --  1.73*   < > 1.73*   GFRESTIMATED 33* 33* 37*  --  42*   < > 42*   CALOS 8.6* 8.5* 8.6*  --  8.0*   < > 7.8*   MAG 1.6* 1.6* 1.7  --  1.6*   < >  --    PROTTOTAL  --   --  7.7  --   --   --  6.8   ALBUMIN  --   --  3.7  --   --   --  3.1*   BILITOTAL  --   --  0.6  --   --   --  0.6   ALKPHOS  --   --  170*  --   --   --  140*   AST  --   --  69*  --   --   --  66*   ALT  --   --  50  --   --   --  52*    < > = values in this interval not displayed.       CBC  Recent Labs   Lab 05/30/23  0509 05/29/23  0534 05/28/23  0646 05/27/23  0654   WBC 8.5 10.2 6.6 10.0   RBC 2.96* 3.29* 2.93* 2.87*   HGB 8.2* 9.1* 8.3* 8.3*   HCT 27.9* 30.6* 28.0* 28.4*   MCV 94 93 96 99   MCH 27.7 27.7 28.3 28.9   MCHC 29.4* 29.7* 29.6* 29.2*   RDW 18.2* 17.9* 17.8* 18.1*    238 201 186       INR  Recent Labs   Lab 05/30/23  0509 05/29/23  0534 05/28/23  0646 05/27/23  0654   INR 2.22* 1.89* 2.03* 1.90*       Patient discussed with Dr. Salmeron.

## 2023-05-30 NOTE — PLAN OF CARE
Hours of Care:  1900 - 0700     Temp:  [97.9  F (36.6  C)-98.6  F (37  C)] 98.3  F (36.8  C)  Pulse:  [] 104  Resp:  [16-22] 18  BP: ()/(61-62) 75/62  SpO2:  [95 %-100 %] 95 %        D: 68 yo male admitted for weakness, anemia, and hypotension following a fall at home. Patient has LVAD Heartmate 3 2/18/2020. History of HF, CAD, HTN, ABHINAV with a CPAP at night, DM type 2, CKD stage 3, Afib RVR, and ANA. HM3 complicated by retrosternal hematoma and bleeding into lungs with R ventricular failure and Vtach and MSSA infections      Neuro: A/O x 4.  Call light appropriate.  Able to make needs known.  Respiratory:  On room air.  Lung sounds clear.  Denies shortness of breath at rest.  Cardiac: VSS.  LVAD HM3, numbers WDL.  GI: Last BM 5/29.  No report of nausea or vomiting.  : Urinating adequate amounts of clear, yellow urine  Skin:  See PCS for assessment and treatment of wounds and surgical incisions.  LDA:  R PIV, Driveline infection MSSA.   Pain: Denies  Activity: Assist of 1  Diet: 2 g Na.  2000 mL fluid restriction     P: Continue to monitor Pt status and report changes to Cards 2. Encourage OOB activity.          Problem: Falls - Risk of 
Goal: *Absence of Falls Document Casper Andrew Fall Risk and appropriate interventions in the flowsheet. Outcome: Progressing Towards Goal 
Fall Risk Interventions: 
Mobility Interventions: Communicate number of staff needed for ambulation/transfer, OT consult for ADLs Mentation Interventions: Adequate sleep, hydration, pain control, Bed/chair exit alarm, Door open when patient unattended, Evaluate medications/consider consulting pharmacy, More frequent rounding, Reorient patient, Update white board Medication Interventions: Evaluate medications/consider consulting pharmacy Elimination Interventions: Call light in reach History of Falls Interventions: Consult care management for discharge planning, Door open when patient unattended Patient

## 2023-05-30 NOTE — PLAN OF CARE
Neuro: A&Ox4. [facility]  Cardiac: HM3 LVAD, numbers within parameters, no alarms. SR with BBB. VSS, doppler MAPs 68-70  Respiratory: Sating >95% on RA. Home Cpap at night.   GI/: Adequate urine output via urinal. Soft BM today   Diet/appetite: Tolerating 2gm NA diet, 2L FR. Eating well.  Activity:  Ax1 up to chair  Pain: At acceptable level on current regimen.   Skin: No new deficits noted.  LDA's: R single lumen PICC (ADIN), PIV (ADIN)     Plan: Continue with POC. Notify primary team with changes. Plan on discharge home with wife tmr.

## 2023-05-30 NOTE — PROCEDURES
The patient's HeartMate LVAD was interrogated 5/30/2023  * Speed 5800 rpm   * Pulsatility index 3.1-3.7   * Power 4.7 Driver   * Flow 5 L/minute   Fluid status: near euvolemic   Alarms were reviewed, and notable for no recent events.  The driveline exit site was inspected, dressing cdi.   All external components were inspected and showed no evidence of damage or malfunction, none replaced.   No changes to VAD settings made

## 2023-05-30 NOTE — PROGRESS NOTES
5/16   Staff msg received based on micro results:    stop the ciprofloxacin and daptomycin. In its place would start cefepime 2 grams IV every 24 hours. The cefepime will cover MSSA and PsA.  Continue to check weekly CBC with diff and CMP.     5/18   Writer spoke with Sheila Phillips at the [facility] and she confirmed switch. Started yesterday 5/17.   Pt was aware of the changes, mychart sent and also called the Pt to notify.

## 2023-05-30 NOTE — CONSULTS
Hepatology consult note:     Consult question: Patient had episodes of disorientation and confusion on 24th and 25th. Word finding difficulties with -ve CT head. Cirrhosis by bx, new encephalopathy with acute tremor. Is liver dysfunction contributing?    Eliseo Tanner is a 67 year old male with hx of CKD III, HTN,  Chronic systolic heart failure 2/2 NICM s/p LVAD ( 2/2020) on AC, complicated post LVAD course ( infection, RV failure and VT on amio, Afib with RVR) , chronic iron deficiency anemia recent diagnosis of liver cirrhosis by biopsy (5/2) presented on 19th with increased MIRANDA found to have hgb 6.7,lactate 2.1 and INR 1.6. s/p colonoscopy 5/22 polypectomy X 3 without evidence of malignancy.      # New diagnosis of liver cirrhosis likely 2/2 congestive hepatopathy in the setting of RV failure. Prior alcohol use also likely contributing. HVPG 15 mmg Hg - Presence of portal hypertension in the setting of cirrhosis put him at risk for HE.   Patient's mental status appears baseline upon interview. Brief departure from baseline on 24th and 25th likely in the setting of polypharmacy. Normal physical exam and mental status, normal ammonia and lack of other inciting etiologies ( infections, active GI bleed or electrolyte abnormalities e.t.c) argue against current HE although patient has multiple risk factors for developing future episodes.     - No further recommendations at this time regarding mental status.   - Please maintain euvolemic status   - goal one BM a day. Gentle bowel regimen if needed.   - Follow with Hepatology OP upon discharge   - Encourage palliative care consult to discuss overall goals of care in the setting of 3 vital organs under distress.    Thank you for this consult. Please contact us should future questions arise.     Carla Chris MD PGY1  Plan discussed with staff Dr.Leventhal.     Subjective   Patient reports presenting to hospital for worsening fatigue. Significant alcohol use but  "quit 25 years ago. Quit tobacco use around that time too. Negative personal or family hx of liver disease. Denies changes in mental status, hemoptysis, hematemesis, rectal bleeding, nausea or vomiting or marked weight loss. Notably patient was seen by Dr. Leventhal 4/29 for work up of liver cirrhosis. Patient has had multiple hospital admission with MIRANDA, volume overload attributed to chronic HF.    Cirrhosis work up: Negative Hepatitis A, B C serology. HIV negative.   ROS negative outside of mentioned above.     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 Lives with wife and adopted grand daughter. Quit tobacco and alcohol use when he was around 23.       Objective   BP (!) 75/62 (BP Location: Left arm)   Pulse 109   Temp 98  F (36.7  C) (Oral)   Resp 20   Ht 1.702 m (5' 7\")   Wt 82.9 kg (182 lb 12.2 oz)   SpO2 100%   BMI 28.62 kg/m    Physical exam:   General: overall well appearing man appears older than stated age, sitting at edge of bed.   Neuro: alert and answering questions. Negative tremor or asterixis. Able to move 4/4 extremities to will and command in symmetric manner.   Card: LVAD machine noise head. Drive line site without overlying erythema  Pulm: vesicular breath sounds bilaterally   GI: non distended non tender bowel sounds present on all quadrants   Skin: No jaundice or scleral icterus notable.      Ammonia - 38  Alkaline phos - 170   AST - 69  ALT - 50      Liver biopsy 5/2023   LIVER, NEEDLE BIOPSY:   - Cirrhosis with active ballooning and Deepthi hyaline bodies, Laennec stage 4A (See comment)      Electronically signed by Jo Ann Kim MD on 5/4/2023 at  2:46 PM   Comment  [id number]   The sample size is adequate by usual criteria and shows liver parenchyma with fibrosis and nodule formation highlighted by the reticulin and trichrome stains. This is associated with hepatocyte ballooning degeneration and Deepthi hyaline.There is no lobular or portal " inflammation or interface activity. There is otherwise no parenchymal or biliary process.  The PAS and PAS-D stains show no abnormal alpha-1-antitrypsin profile or other cytoplasmic accumulations.   Iron stain is negative for stainable parenchymal iron but shows scattered kupffer cell iron staining.      The pattern of injury is that of steatohepatitis with fibrosis, although little or no steatosis is present at this time, which can occur in burnt out steatohepatitis. The most likely contributory agent in this patient is amiodarone. This drug has been associated with steatohepatitis with jennyfer hyaline. Other co-contributory factors for steatohepatitis to evaluate in this patient include alcohol use, IBANEZ related factors, such as hyperlipidemia, diabetes mellitus, elevated BMI and others. Lastly, a clinical concern for congestive hepatopathy is noted, however, the features associated with this pattern of injury such as sinusoidal dilatation, congestion and the typical atrophy seen in fibrosis from congestive hepatopathy is inapparent. Therefore while a role for congestion over time in the development of fibrosis cannot be ruled out, there is at this time no sufficient evidence to confirm this.

## 2023-05-30 NOTE — PROGRESS NOTES
General Infectious Disease Service Progress Note- Yellow Team  Patient:  Eliseo Tanner, Date of birth 1955, Medical record number [id number]           Assessment and Recommendations:   Problem List:  AMS, resolved  -On the night of 5/24/23-5/25/23, he became altered. Occasionally seemed to have word finding difficulty. He also had mood and behavioral changes with more confusion.  -Repeat blood cx were obtained. UA was negative at admission and repeat is pending. Repeat CT head was negative. CXR was overall unremarkable. His amonia level was normal. EEG was negative.   -he came back to his normal neurological baseline on 5/26. While cause unclear, wth his rapid improvement following transition from cefepime to meropenem, (he essentially cleared his MS prior to clearing cefepime) its unlikely that cefepime was contributing to his CNS issues.       LVAD History:  Current LVAD model: History of NICM s/p HM III  Date current LVAD placed: 2/18/20  Previous LVAD devices: none  Other prosthetic devices/materials:  ICD 3/16/20, Mems 8/2022  Primary cardiologist: Dr. Cisneros  Primary ID provider: Magy     History of bacteremias (dates and organisms):   --History of pre-op Proteus and Enterococcus bacteremia  --MSSA bacteremia secondary to MSSA driveline infection (11/2020).  Remains on cefadroxil or cephalexin for suppression.     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 Increased drainage and fluid collection in 12/2022 managed with I&D and course of daptomycin. s/p I&D of fluid collection on 12/11/22; Cx +MSSA (tetra-R). 12/7 outside culture with both MRSA and MSSA. Attempted to transition to Bactrim suppression in 3/2023 but developed BERNARDA and was started on  Daptomycin from the 12/2022 admission. Culture 3/2/23 with MSSA and Pseudomonas x2 (pan-S) and MSSA x2 strains  (both R: tetracycline; 1 with high daptomycin DONTA). Ciprofloxacin was added due to increased drainage. 3/24 cultures with Pseudomonas x3 strains (all FQ resistant). Although cipro resistant pseudomonas he clinically improved so cipro treatment was continued. CT (4/23/23) with soft tissue thickening along driveline but no abscess. 5/11/23 driveline exit site grew 2 strains of PsA that are resistance to fluoroquinolones so was placed on cefepime and continued on dapto. Repeat cultures on 5/11/23 grew PsA only so on 5/16/23 stopped dapto. He was continued on cefepime monotherapy as it also covers MSSA and no recent cultures grew MRSA. Per nursing he has more drainage from the exit site and with concern for cefepime induced CNS toxicity, he is now on  Meropenem. On 5/26 we obtained cultures from LVAD exit site and are negative.     History of other pertinent infections:   --Hx severe Legionella pneumonia (serogroup 1L) c/b cardiogenic shock, renal failure requiring CRRT, and resp failure requiring intubation, s/p azithromycin     History of driveline area irritation and current mitigation strategies:  local wound care center using vashe nzt 5.5 wound solution - soaking a hydrofera blue sponge and covering with mepilex. Changing dressing daily.   Current suppressive antibiotics: cefepime      Discussion:  67 year old LVAD hx of MSSA and PsA LVAD exit site infection on cefepime who was admitted for concern for GI bleed (EGD done and inflamed polyp possible source) 5/24-5/25 night he was confused and ID was consulted for possible cefepime toxicity.    The pt is now on chepe (cefepime stopped on 5/25/23) as there was a concern for cefepime neurotoxicity and continued drainage from the LVAD site (per nursing on 5/25/23). LVAD exit site cultures are negative for any growth.  Its not clear to me if the drainage is actually worse than his baseline after talking to the wife. By 5/25/23 afternoon and 5/26 AM looks much better and  is back to baseline neuro status. With such a rapid improvement of his neurological status, its unlikely that he had cefepime induced neurotoxicity and was probably experiencing delirium. Other infectious work up with CXR, UA and blood Cx are unremarkable to date. Therefore, please deescalating from chepe back to cefepime.    Recommendations:  -stop Meropenem and restart cefepime (renal dosing ) IV as before  -please take pictures of the LVAD exit site when there is a dressing change.   -will consider workup for phage therapy as outpatient.   -f/u with Dr. Bhatti on 6/6/23 (already scheduled. 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 He was on cefepime/dapto until 5/16/23 when he was switched to cefepime monotherapy as he failed to grow MRSA in several recent cultures and consistently grew PsA. Also recently diagnosed with stage IV cirrhosis via liver biopsy. He presented on 5/19/23 to ED after recurrent falls at home and anemia.     He was recently admitted and discharged on 5/3/23. Since being home, he had generalized fatigue and lightheadedness with standing and as a result has recurrent falls. He never lost LOC. At home he contied to have weight gain. No LVAD issues or low flow alarms. His Hb at admission was 7.2 baseline 9-10s. He had a EGD and colonoscopy on 5/22/23 . EGD showed Three gastric inflammatory polyps seen on exam which was biopsied. These were without sign of active bleeding, but could be a contributing cause of patient anemia. colonoscopy showed polyps which were biopsied as well. CT head without intracranial bleed.    Overnight, he became altered.  "Occasionally seems to have word finding difficulty that resolves per nursing notes. He also had mood and behavioral changes. Repeat blood cx were obtained. UA was negative at admission. Repeat CT head was negative. CXR was overall unremarkable. His amonia level was normal. Given that he is on cefepime, ID was consulted for recommendations. Unable to obtain a social history given his confusion.         Interval history:     The had no acute events over the long weekend.  He was alert and oriented this AM. He has no complaints and denies any fevers, chills, sob, chest pain, abd pain, n/v/d.          Physical Exam:   BP (!) 75/62 (BP Location: Left arm)   Pulse 104   Temp 97.9  F (36.6  C) (Oral)   Resp 20   Ht 1.702 m (5' 7\")   Wt 82.9 kg (182 lb 12.2 oz)   SpO2 95%   BMI 28.62 kg/m         Exam:  GENERAL:  Well-developed, well-nourished, not in acute distress.   HEAD: Normocephalic and atraumatic  ENT:  No hearing impairment, no ear pain or exudate. nose and mouth without ulcers or lesions  EYES:  Eyes grossly normal to inspection, PERRL and conjunctivae and sclerae normal   NECK:  Supple, no adenopathy, no asymmetry  LUNGS:  Clear to auscultation - no rales, rhonchi or wheezes  CARDIOVASCULAR:  VAD sounds.   ABDOMEN:  Soft, nontender, no hepatosplenomegaly, no masses and bowel sounds normal. Wound overall looking good.   EXT: Extremities warm and without edema.  MS: No gross musculoskeletal defects noted, minimal edema  SKIN:  No acute rashes or suspicious lesions. PICC good position in right arm  NEUROLOGIC:  Grossly nonfocal.  PSYCHIATRIC: Mood stable, mentation appears normal, affect normal  HEMATOLOGIC/LYMPHATIC: No lymphadenopathy or bleeding           Laboratory Data:     Creatinine   Date Value Ref Range Status   05/30/2023 2.15 (H) 0.67 - 1.17 mg/dL Final   05/29/2023 2.13 (H) 0.67 - 1.17 mg/dL Final   05/29/2023 1.92 (H) 0.67 - 1.17 mg/dL Final   05/28/2023 1.73 (H) 0.67 - 1.17 mg/dL Final "   05/28/2023 1.48 (H) 0.67 - 1.17 mg/dL Final   04/09/2021 1.6 mg/dL Final   03/19/2021 2.1 mg/dL Final   03/17/2021 2.00 (H) 0.66 - 1.25 mg/dL Final   03/16/2021 2.18 (H) 0.66 - 1.25 mg/dL Final   03/15/2021 2.31 (H) 0.66 - 1.25 mg/dL Final     WBC   Date Value Ref Range Status   03/19/2021 7.9 10^9/L Final   03/17/2021 6.6 4.0 - 11.0 10e9/L Final   03/16/2021 6.5 4.0 - 11.0 10e9/L Final   03/15/2021 6.3 4.0 - 11.0 10e9/L Final   03/14/2021 5.9 4.0 - 11.0 10e9/L Final     WBC Count   Date Value Ref Range Status   05/30/2023 8.5 4.0 - 11.0 10e3/uL Final   05/29/2023 10.2 4.0 - 11.0 10e3/uL Final   05/28/2023 6.6 4.0 - 11.0 10e3/uL Final   05/27/2023 10.0 4.0 - 11.0 10e3/uL Final   05/26/2023 11.9 (H) 4.0 - 11.0 10e3/uL Final     Hemoglobin   Date Value Ref Range Status   05/30/2023 8.2 (L) 13.3 - 17.7 g/dL Final   03/19/2021 10.1 (A) 13.3 - 17.7 g/dL Final     Platelet Count   Date Value Ref Range Status   05/30/2023 199 150 - 450 10e3/uL Final   03/19/2021 351 150 - 450 10^9/L Final   03/17/2021 268 150 - 450 10e9/L Final     Lab Results   Component Value Date     05/30/2023    BUN 42.1 (H) 05/30/2023    CO2 22 05/30/2023     CRP Inflammation   Date Value Ref Range Status   08/17/2021 4.9 0.0 - 8.0 mg/L Final   02/16/2021 270.0 (H) 0.0 - 8.0 mg/L Final   02/15/2021 270.0 (H) 0.0 - 8.0 mg/L Final   02/11/2021 8.6 0.0 - 10.0 mg/L Final   12/17/2020 8.0 0.0 - 8.0 mg/L Final   12/14/2020 6.1 0.0 - 10.0 mg/L Final           Pertinent Recent Microbiology Data:   No results for input(s): CULT, SDES in the last 168 hours.         Imaging:     Recent Results (from the past 48 hour(s))   CT Head w/o Contrast    Narrative    CT HEAD W/O CONTRAST 5/25/2023 10:40 AM    History: altered mental status/confusion in LVAd patient, please r/o  bleed     Comparison: 5/20/2023    Technique: Using multidetector thin collimation helical acquisition  technique, axial, coronal and sagittal CT images from the skull base  to the vertex  were obtained without intravenous contrast.   (topogram) image(s) also obtained and reviewed.    Findings: There is no intracranial hemorrhage, mass effect, or midline  shift. Gray/white matter differentiation in both cerebral hemispheres  is preserved. Basal ganglia calcifications similar to prior.  Ventricles are proportionate to the cerebral sulci. The basal cisterns  are clear.    The bony calvaria and the bones of the skull base are normal. The  visualized portions of the paranasal sinuses and mastoid air cells are  clear.       Impression    Impression:  No acute intracranial pathology.     I have personally reviewed the examination and initial interpretation  and I agree with the findings.    KATHY SEAMAN MD         SYSTEM ID:  [id number]   XR Chest Port 1 View    Narrative    Portable chest    INDICATION: Acute medical syndrome rule out aspiration    COMPARISON: 5/20/2023    FINDINGS: Heart size upper normal. Median sternotomy, left subclavian  transvenous approach implantable cardiac defibrillator and LVAD again  present. No new consolidative opacities. Mild central perihilar patchy  densities unchanged. High riding bilateral humeral heads may indicate  supraspinatus tendon degeneration at both shoulders.      Impression    IMPRESSION: No significant changes with perhaps minimal central  edema/atelectasis without definite aspiration. No consolidation. LVAD.  Implantable cardiac defibrillator.    AIME GAY MD         SYSTEM ID:  [id number]

## 2023-05-31 NOTE — PROGRESS NOTES
General Infectious Disease Service Progress Note- Yellow Team  Patient:  Eliseo Tanner, Date of birth 1955, Medical record number [id number]           Assessment and Recommendations:   Problem List:  AMS, resolved  -On the night of 5/24/23-5/25/23, he became altered. Occasionally seemed to have word finding difficulty. He also had mood and behavioral changes with more confusion.  -Repeat blood cx were obtained. UA was negative at admission and repeat is pending. Repeat CT head was negative. CXR was overall unremarkable. His amonia level was normal. EEG was negative.   -he came back to his normal neurological baseline on 5/26. While cause unclear, wth his rapid improvement following transition from cefepime to meropenem, (he essentially cleared his MS prior to clearing cefepime) its unlikely that cefepime was contributing to his CNS issues. He was restarted on cefepime on 5/30 and has no AMS at this time.       LVAD History:  Current LVAD model: History of NICM s/p HM III  Date current LVAD placed: 2/18/20  Previous LVAD devices: none  Other prosthetic devices/materials:  ICD 3/16/20, Mems 8/2022  Primary cardiologist: Dr. Cisneros  Primary ID provider: Magy     History of bacteremias (dates and organisms):   --History of pre-op Proteus and Enterococcus bacteremia  --MSSA bacteremia secondary to MSSA driveline infection (11/2020).  Remains on cefadroxil or cephalexin for suppression.     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 Increased drainage and fluid collection in 12/2022 managed with I&D and course of daptomycin. s/p I&D of fluid collection on 12/11/22; Cx +MSSA (tetra-R). 12/7 outside culture with both MRSA and MSSA. Attempted to transition to Bactrim suppression in 3/2023 but developed BERNARDA and was started on  Daptomycin from the 12/2022 admission. Culture  3/2/23 with MSSA and Pseudomonas x2 (pan-S) and MSSA x2 strains (both R: tetracycline; 1 with high daptomycin DONTA). Ciprofloxacin was added due to increased drainage. 3/24 cultures with Pseudomonas x3 strains (all FQ resistant). Although cipro resistant pseudomonas he clinically improved so cipro treatment was continued. CT (4/23/23) with soft tissue thickening along driveline but no abscess. 5/11/23 driveline exit site grew 2 strains of PsA that are resistance to fluoroquinolones so was placed on cefepime and continued on dapto. Repeat cultures on 5/11/23 grew PsA only so on 5/16/23 stopped dapto. He was continued on cefepime monotherapy as it also covers MSSA and no recent cultures grew MRSA. Per nursing he has more drainage from the exit site and with concern for cefepime induced CNS toxicity, he is now on  Meropenem. On 5/26 we obtained cultures from LVAD exit site and are negative.     History of other pertinent infections:   --Hx severe Legionella pneumonia (serogroup 1L) c/b cardiogenic shock, renal failure requiring CRRT, and resp failure requiring intubation, s/p azithromycin     History of driveline area irritation and current mitigation strategies:  local wound care center using vashe nzt 5.5 wound solution - soaking a hydrofera blue sponge and covering with mepilex. Changing dressing daily.   Current suppressive antibiotics: cefepime      Discussion:  67 year old LVAD hx of MSSA and PsA LVAD exit site infection on cefepime who was admitted for concern for GI bleed (EGD done and inflamed polyp possible source) 5/24-5/25 night he was confused and ID was consulted for possible cefepime toxicity.    The pt is now on chepe (cefepime stopped on 5/25/23) as there was a concern for cefepime neurotoxicity and continued drainage from the LVAD site (per nursing on 5/25/23). LVAD exit site cultures are negative for any growth.  Its not clear to me if the drainage is actually worse than his baseline after talking to  the wife. By 5/25/23 afternoon and 5/26 AM looks much better and is back to baseline neuro status. With such a rapid improvement of his neurological status, its unlikely that he had cefepime induced neurotoxicity and was probably experiencing delirium. Other infectious work up with CXR, UA and blood Cx are unremarkable to date. Therefore, deescalated from chepe back to cefepime on 5/30/23.    Recommendations:  -Continue cefepime (renal dosing ) IV as before  -please take pictures of the LVAD exit site when there is a dressing change.   -will consider workup for phage therapy as outpatient.   -f/u with Dr. Bhatti on 6/6/23 (already scheduled)    ID will sign off. Please call if there are any questions.     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 He was on cefepime/dapto until 5/16/23 when he was switched to cefepime monotherapy as he failed to grow MRSA in several recent cultures and consistently grew PsA. Also recently diagnosed with stage IV cirrhosis via liver biopsy. He presented on 5/19/23 to ED after recurrent falls at home and anemia.     He was recently admitted and discharged on 5/3/23. Since being home, he had generalized fatigue and lightheadedness with standing and as a result has recurrent falls. He never lost LOC. At home he contied to have weight gain. No LVAD issues or low flow alarms. His Hb at admission was 7.2 baseline 9-10s. He had a EGD and colonoscopy on 5/22/23 . EGD showed Three gastric inflammatory polyps seen on exam which was biopsied. These were without sign of active bleeding, but could be a contributing cause of patient anemia. colonoscopy showed  "polyps which were biopsied as well. CT head without intracranial bleed.    Overnight, he became altered. Occasionally seems to have word finding difficulty that resolves per nursing notes. He also had mood and behavioral changes. Repeat blood cx were obtained. UA was negative at admission. Repeat CT head was negative. CXR was overall unremarkable. His amonia level was normal. Given that he is on cefepime, ID was consulted for recommendations. Unable to obtain a social history given his confusion.         Interval history:     He has no issues over night with a rechallange of cefepime. He is alert and oriented. He denies any fevers, chills, sob, chest pain, abd pain, n/v/d at this time. He states that yesterday with the dressing change there was no drainage.          Physical Exam:   BP (!) 75/62 (BP Location: Left arm)   Pulse 85   Temp 98.3  F (36.8  C)   Resp 16   Ht 1.702 m (5' 7\")   Wt 80 kg (176 lb 5.9 oz)   SpO2 100%   BMI 27.62 kg/m         Exam:  GENERAL:  Well-developed, well-nourished, not in acute distress. AAOx4   HEAD: Normocephalic and atraumatic  ENT:  No hearing impairment, no ear pain or exudate. nose and mouth without ulcers or lesions  EYES:  Eyes grossly normal to inspection, PERRL and conjunctivae and sclerae normal   NECK:  Supple, no adenopathy, no asymmetry  LUNGS:  Clear to auscultation - no rales, rhonchi or wheezes  CARDIOVASCULAR:  VAD sounds.   ABDOMEN:  Soft, nontender, no hepatosplenomegaly, no masses and bowel sounds normal. LVAD exit site has clean bandages  EXT: Extremities warm and without edema.  MS: No gross musculoskeletal defects noted, minimal edema  SKIN:  No acute rashes or suspicious lesions. PICC good position in right arm  NEUROLOGIC:  Grossly nonfocal.  PSYCHIATRIC: Mood stable, mentation appears normal, affect normal  HEMATOLOGIC/LYMPHATIC: No lymphadenopathy or bleeding           Laboratory Data:     Creatinine   Date Value Ref Range Status   05/31/2023 2.03 (H) " 0.67 - 1.17 mg/dL Final   05/30/2023 1.96 (H) 0.67 - 1.17 mg/dL Final   05/30/2023 2.15 (H) 0.67 - 1.17 mg/dL Final   05/29/2023 2.13 (H) 0.67 - 1.17 mg/dL Final   05/29/2023 1.92 (H) 0.67 - 1.17 mg/dL Final   04/09/2021 1.6 mg/dL Final   03/19/2021 2.1 mg/dL Final   03/17/2021 2.00 (H) 0.66 - 1.25 mg/dL Final   03/16/2021 2.18 (H) 0.66 - 1.25 mg/dL Final   03/15/2021 2.31 (H) 0.66 - 1.25 mg/dL Final     WBC   Date Value Ref Range Status   03/19/2021 7.9 10^9/L Final   03/17/2021 6.6 4.0 - 11.0 10e9/L Final   03/16/2021 6.5 4.0 - 11.0 10e9/L Final   03/15/2021 6.3 4.0 - 11.0 10e9/L Final   03/14/2021 5.9 4.0 - 11.0 10e9/L Final     WBC Count   Date Value Ref Range Status   05/31/2023 6.9 4.0 - 11.0 10e3/uL Final   05/30/2023 8.5 4.0 - 11.0 10e3/uL Final   05/29/2023 10.2 4.0 - 11.0 10e3/uL Final   05/28/2023 6.6 4.0 - 11.0 10e3/uL Final   05/27/2023 10.0 4.0 - 11.0 10e3/uL Final     Hemoglobin   Date Value Ref Range Status   05/31/2023 9.0 (L) 13.3 - 17.7 g/dL Final   03/19/2021 10.1 (A) 13.3 - 17.7 g/dL Final     Platelet Count   Date Value Ref Range Status   05/31/2023 193 150 - 450 10e3/uL Final   03/19/2021 351 150 - 450 10^9/L Final   03/17/2021 268 150 - 450 10e9/L Final     Lab Results   Component Value Date     05/31/2023    BUN 41.5 (H) 05/31/2023    CO2 28 05/31/2023     CRP Inflammation   Date Value Ref Range Status   08/17/2021 4.9 0.0 - 8.0 mg/L Final   02/16/2021 270.0 (H) 0.0 - 8.0 mg/L Final   02/15/2021 270.0 (H) 0.0 - 8.0 mg/L Final   02/11/2021 8.6 0.0 - 10.0 mg/L Final   12/17/2020 8.0 0.0 - 8.0 mg/L Final   12/14/2020 6.1 0.0 - 10.0 mg/L Final           Pertinent Recent Microbiology Data:   No results for input(s): CULT, SDES in the last 168 hours.         Imaging:     Recent Results (from the past 48 hour(s))   CT Head w/o Contrast    Narrative    CT HEAD W/O CONTRAST 5/25/2023 10:40 AM    History: altered mental status/confusion in LVAd patient, please r/o  bleed     Comparison:  5/20/2023    Technique: Using multidetector thin collimation helical acquisition  technique, axial, coronal and sagittal CT images from the skull base  to the vertex were obtained without intravenous contrast.   (topogram) image(s) also obtained and reviewed.    Findings: There is no intracranial hemorrhage, mass effect, or midline  shift. Gray/white matter differentiation in both cerebral hemispheres  is preserved. Basal ganglia calcifications similar to prior.  Ventricles are proportionate to the cerebral sulci. The basal cisterns  are clear.    The bony calvaria and the bones of the skull base are normal. The  visualized portions of the paranasal sinuses and mastoid air cells are  clear.       Impression    Impression:  No acute intracranial pathology.     I have personally reviewed the examination and initial interpretation  and I agree with the findings.    KATHY SEAMAN MD         SYSTEM ID:  [id number]   XR Chest Port 1 View    Narrative    Portable chest    INDICATION: Acute medical syndrome rule out aspiration    COMPARISON: 5/20/2023    FINDINGS: Heart size upper normal. Median sternotomy, left subclavian  transvenous approach implantable cardiac defibrillator and LVAD again  present. No new consolidative opacities. Mild central perihilar patchy  densities unchanged. High riding bilateral humeral heads may indicate  supraspinatus tendon degeneration at both shoulders.      Impression    IMPRESSION: No significant changes with perhaps minimal central  edema/atelectasis without definite aspiration. No consolidation. LVAD.  Implantable cardiac defibrillator.    AIME GAY MD         SYSTEM ID:  [id number]

## 2023-05-31 NOTE — PLAN OF CARE
Physical Therapy Discharge Summary    Reason for therapy discharge:    Discharged to home with home therapy.    Progress towards therapy goal(s). See goals on Care Plan in [facility] electronic health record for goal details.  Goals not met.  Barriers to achieving goals:   discharge from facility.    Therapy recommendation(s):    Continued therapy is recommended.  Rationale/Recommendations:  To reduce fall risk and improve activity tolerance

## 2023-05-31 NOTE — PLAN OF CARE
Neuro: A&Ox4.   Cardiac: LVAD HM 3, WNL.   Respiratory: Sating >92% on RA.  GI/: Adequate urine output.   Diet/appetite: Tolerating Na 2 gram restricted diet. Eating well.  Activity:  Assist of 1, up to chair and in halls.  Pain: At acceptable level on current regimen.   Skin: No new deficits noted.  LDA's:  1 Single lumen picc, saline lock.    DISCHARGE                         No discharge date for patient encounter.  ----------------------------------------------------------------------------  Discharged to: Home  Via: private transportation  Accompanied by: Family  Discharge Instructions: *diet, *activity, medications, follow up appointments, when to call the MD, aftercare instructions.  Prescriptions: To be filled by   [facility]   pharmacy; medication list reviewed & sent with pt  Follow Up Appointments: arranged; information given  Belongings: All sent with pt  IV: Will go home with 1 single lumen picc.  Telemetry: d/c'd  Pt exhibits understanding of above discharge instructions; all questions answered.    Discharge Paperwork: Signed, copied, and sent home with patient.

## 2023-05-31 NOTE — PROGRESS NOTES
ANTICOAGULATION  MANAGEMENT: Discharge Review    Eliseo Tanner chart reviewed for anticoagulation continuity of care    Hospital Admission on 5/19-5/31/23 for anemia, weakness, then fluid overload and confusion/altered mental status which resolved. Recurrent falls at home and drop in Hgb.     EGD/Colonoscopy 5/22/23: Three gastric inflammatory polyps seen on exam which was biopsied. These were without sign of active bleeding, but could be a contributing cause of patient anemia.     Discharge disposition: Home    Results:    Recent labs: (last 7 days)     05/25/23  0437 05/26/23  0400 05/27/23  0654 05/28/23  0646 05/29/23  0534 05/30/23  0509 05/31/23  0531   INR 1.37* 1.51* 1.90* 2.03* 1.89* 2.22* 2.40*     Anticoagulation inpatient management:     5/20 None  5/21 None  5/22 None  5/23 4 mg  5/24 4 mg   5/25 5 mg  5/26 5 mg  5/27 2 mg  5/28 3 mg  5/29 3 mg  5/30 2 mg    see tracking calendar    Anticoagulation discharge instructions:     Warfarin dosing: Take 1 tablet (3 mg) by mouth daily with INR recheck 6/1/23   Bridging: No   INR goal change: No      Medication changes affecting anticoagulation:     holding ASA 81 mg daily, do not plan to resume     START taking:  ceFEPIme (MAXIPIME)  ferrous sulfate (FEROSUL)  pantoprazole (PROTONIX)  QUEtiapine (SEROquel)    Additional factors affecting anticoagulation: falls while on Warfarin      PLAN     Agree with discharge plan for follow up on 6/1/23    Patient not contacted    Anticoagulation Calendar updated    PACO MARQUEZ RN

## 2023-05-31 NOTE — PHARMACY
[facility]  Parenteral ANtibiotic Review at Departure from Acute Care Collaborative Note     Patient: Eliseo Tanner  MRN: [id number]  Allergies: Heparin, Chlorhexidine, and Oxycodone    Current Location:  6B  OPAT to be provided by: Other ([facility] [place])    [facility] ([phone])  Line Type: PICC    Diagnosis/Indications: Chronic LVAD driveline infection  Organism(s): Pseudomonas aeruginosa  MRDO? No  Pending Cultures/Microbiological Tests: no      Inpatient ID involved in developing OPAT plan: Yes - discharge OPAT plan has no changes from ID provider, Dr. Juan C Ayoub, OPAT plan charted on 5/31/2023    Outpatient ID Follow-up: ID OPAT Clinic Referral Placed ([facility] Ph: [phone] and Fax: [phone]) - appointment scheduled  Designated Provider: Dr. Marlin Blanco    Antimicrobial Regimen / Route Anticipated  Duration Start Date Stop /  Reassess Date   Cefepime 2 g every 24 hours/IV TBD 5/16/2023 To be determined by outpatient ID provider     Laboratory Tests and Monitoring Frequency: CBC with Diff, CMP Once Weekly    Imaging/Miscellaneous Monitoring: None    ID Pharmacist Interventions: None                          Sarah Jimenez, PharmD, BCIDP  Pager: [phone]

## 2023-05-31 NOTE — DISCHARGE SUMMARY
[facility]   Cardiology II Service / Advanced Heart Failure  Discharge Summary     Eliseo Tanner MRN# [id number]   YOB: 1955 Age: 67 year old     DATE OF ADMISSION:  5/19/2023  DATE OF DISCHARGE: 5/31/2023  ADMITTING PROVIDER: Destiny Salmeron MD  DISCHARGE PROVIDER: Jan Jiang MD and Siena Aguiar DNP, ANP-C   PRIMARY PROVIDER:  Jay Steele    ADMIT DIAGNOSES:     Acute on chronic anemia    Falls, weakness    Acute on chronic systolic heart failure 2/2 NICM, acute on chronic RV failure    Sp HM3 VAD    Chronic VAD drive line infection    troponinemia     BERNARDA on CKD stage III    Diuretic induced hypokalemia    Chronic hyponatremia     New diagnosis liver cirrhosis     DISCHARGE DIAGNOSES:     Acute on chronic BELA 2/2 ?bleeding polyps    Chronic systolic heart failure 2/2 NICM, Chronic RV failure    Sp HM3 VAD    Chronic VAD drive line infection    BERNARDA on CKD stage III    Diuretic induced hypokalemia, resolved    Diuretic induced hypomagnesia, resolved    Metabolic encephalopathy, resolved    Insomnia     Tremor due to acute illness, improved    FOLLOW-UP:  [] BMP and INR tomorrow at local hospital  [] weekly [facility]  [] appt with Dr iCsneros 6/12 locally    PENDING RESULTS:   [] none    HPI: Please see the detailed H & P by Milton Pratt and Jaron from 5/19/2023. Briefly, Eliseo Tanner is a 67 year old male with chronic systolic heart failure secondary to NICM (Stage D, NYHA Class IIIA)s/p HM 3 on 2/18/2020 moderate CAD, HTN, ABHINAV on CPAP, DM2, CKD Stage III, ANA. 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 He presented on 5/19/23 to ED after recurrent falls at home and drop in Hgb. Prolonged recent hospitalization for acute on chronic heart failure  requiring IV diuresis. He discharged to home on 5/3/23 despite not being back to euvolemia as pt wanted to get home sooner. Pretty much since leaving the hospital, pt has been doing poorly at home. Significant generalized fatigue, lightheadedness particularly with sitting up or standing, and recurrent falls because of his lightheadedness at home (multiple daily). Did hit his R forehead on fall from standing height on 5/18 AM, no headaches or neuro deficits. No LOC. Has been taking meds as prescribed but has had slowly increasing weight (188 on discharge, 194 lbs on admit). No dyspnea, chest pain, orthopnea, PND. Has several scrapes and bruises particularly on arms from falls but no other injuries. Denies N/V, diarrhea/constipation, hematochezia, or melena, also has no increased flank/back pain and no epistaxis. No LVAD issues or low flow alarms. Has been getting labs checked as outpatient and hgb has been down trending from baseline 9-10s to 7.2 on 5/19 prompting him going to outside ED. Was transferred to [facility] ED for further management. Has chronic driveline infection, abx recently switched to cefepime monotherapy by LVAD ID clinic for culture growing resistant pseudomonas.      ED course:  - Vitals: afebrile, doppler MAP 59, satting well on room air  - Labs: Hgb 6.7, WBC WNL, Na 127 (around recent baseline), K 2.7, lactate 2.1,   - Interventions: replace K, CT head non-con, 1 unit PRBC ordered    HOSPITAL COURSE:   # Acute on chronic BELA anemia  # ?dt bleeding polys  Hgb 6.7 on admit from baseline 9-10, had been drifting as an outpatient. No signs of overt bleeding. Hypotensive on admit (MAP 59) and has positional lightheadedness and weakness. Lactate 2.1 on admit. Unclear source for worsened anemia. He did have multiple lacerations/hematomas on arms from falls which could be contributing. No evidence of acute GI bleed given lack of hematochezia/melena or diarrhea, however, he does have known BELA and hasn't been  scoped per review of our records. GI was consulted on admission.     Colonoscopy 5/22/23 showed   A 2 mm polyp was found in the ascending colon. The polyp was sessile.        The polyp was removed with a cold biopsy forceps. Resection and        retrieval were complete.        A 7 mm polyp was found in the transverse colon. The polyp was flat. The        polyp was removed with a cold snare. Resection and retrieval were        complete.        A 6 mm polyp was found in the sigmoid colon. The polyp was flat. The        polyp was removed with a cold snare. Resection and retrieval were        complete. There was mild increased oozing from the site of polypectomy.        Three clips were placed with complete cessation of bleeding.        Small area of bleeding at the descending colon, which was not actively        bleeding on exam, likely related to scope trauma.        Non-bleeding internal hemorrhoids were found during retroflexion. The        hemorrhoids were small.     EGD 5/22/23 showed    Three gastric inflammatory polyps seen on exam which                          was biopsied. These were without sign of active                          bleeding, but could be a contributing cause of patient                          anemia.                          - Z-line irregular, 41 cm from the incisors.                          - Gastroesophageal flap valve classified as Hill Grade                          IV (no fold, wide open lumen, hiatal hernia present).                          - Small hiatal hernia.                          - Three gastric polyps. Biopsied.                          - Gastric mucosal variant. Biopsied.                          - Normal examined duodenum. Biopsied.     He was transfused 5/20/23. Warfarin resumed by ASA stopped. His pta omeprazole was changed to BID pantoprazole. He was given course of IV iron (300 mg IV x3 doses). Started oral ferous sulfate at discharge. Hgb 9 on day of d.c. Per GI, no  need for repeat EGD as outpatient per advanced endoscopy unless further bleeding, H. Pylori. Repeat colonoscopy in 3 years.     # Acute on chronic systolic heart failure/HFrEF secondary to NICM  # Acute on chronic RV failure  # s/p HM3 LVAD  Stage D. NYHA Class IIIB. Echo 4/28/23 septum normal, AV opening with each systole, and LVIDd 5.4 cm. RHC during last admit at 5800 rpm 4/28/23 mRA-12, mPCWP-12, JOSE Co-7, JOSE CI-3.6-note with drop in LVAD speed no significant improvement in RA with increase in wedge.     -Fluid status: near euvolemic, CardioMems PAD 19 which is within his goal, weight on day of d.c 176 lb (EDW)  -Diuresis: Bumex gtt discontinued on 5/28, resumed PTA torsemide 100 mg TID and hydrochlorothiazide 100 mg daily; and IV bumex 5 mg increased from twice weekly three times weekly   -RV support: attempted cilostazol previously but discontinued due to epistaxis.   -ACEi/ARB/ARNi: contraindicated due to renal dysfunction  -BB: contraindicated due to RV failure  -Aldosterone antagonist: contraindicated due to renal dysfunction   -SGLT2i: Hold PTA jardiance in setting of hypotension, resumed on day of discharge   -Afterload reduction: d/c'ed PTA hydralazine 50 mg tid, imdur 120 mg daily, and amlodipine 10 mg daily in setting of hypotension. Defer to outpatient to resume, MAPS 70s.   -SCD prophylaxis: ICD  -LDH trends: 300s, stable   -Anticoagulation: warfarin INR goal 1.7-2.3 due to epistaxis and recent GIB, INR 2.4 on day of discharge, recommend INR tomorrow and 3 mg daily for now  -Antiplatelet: holding ASA 81 mg daily, do not plan to resume (see below)    # Altered mental status/encephalopathy, likely metabolic, resolved  Noted behavorial and mood changes 5/24-5/25, insomnia/agitation, fixated on ambiguous tasks, etc.stutter, but no word-finding. Exam was non focal. Broad differential, but stoke appears unlikely given his reassuring neurologic exam. Likely metabolic encephalopathy, delerium, etc. Head  CT negative. Thus far infectious work up negative. Ammonia WNL and no asterixis. EEG negative. Returned to baseline after 24 hours. Initially thought to be cefepime, so this was stopped, but improved quicker than would be expected (before he missed a dose). Therefore cefepime was resumed. Psych consulted and paxil was weaned off, ambien stopped, and started on Seroquel 12.5 mg at HS (note: 25 mg made him drowsy). Hepatology does not feel symptoms 2/2 hepatic encephalopathy. Uremia and hyponatremia likely contributing with recent BUN of 100 and Na 127. Mental status now at baseline, Na 140 and BUN 41 today.      # Recently diagnosed cirrhosis with portal hypertension  Mildly elevated LFTs, stable. Had evaluation including liver biopsy during last admission which showed cirrhosis - likely multifactorial (remote EtOH misuse, NAFLD, chronic congestive hepatopathy from RV failure). No evidence of decompensation. CT A/P (5/20) with stable small volume ascites and mild mesenteric/retroperitoneal edema/cholelithiasis. Hepatology consulted 5/30, no concern for HE though at risk for. Ensure -2 formed BM daily.      # Falls, generalized fatigue, improved  P/w 2 weeks of generalized fatigue, positional lightheadedness associated with frequent falls. Mildly hypotensive on admission. Symptoms likely 2/2 orthostasis in setting of acute on chronic anemia. CT head without intracranial bleed. Treating anemia, volume, and BPs as above. Improved with therapies, deemed safe for home.      # BERNARDA on CKD Stage III  # Diuretic induced hypokalemia  # Diuretic induced hypomagneisua  BERNARDA during recent admission, Cr was 2.59 at discharge and admitted at 2.34. Kidney injury likely multifactorial but primary  at this time is likely pre-renal from hypotension related to acute on chronic anemia. Cr 2 on day of discharge, worse than baseline but he is at dry weight. Monitor as outpatient. Increased standing KCL and started Mg OX this  admission. BMP and Mg ordered for tomorrow, and weekly.      # Chronic hyponatremia  Na~ 127-132 for past month. Admitted at 127, 140 on day of discharge.      # Chronic LVAD drive line infection, MSSA and PSA  Follows with LVAD ID, had recently been switched from cipro and daptomycin to cefepime on 5/16 d/t culture growing quinolone resistant PSA and no MSSA. However, cefepime stopped on 5/25 d/t confusion. He also had increased drainage (clear, but increased in volume). He was switched to merrem. ID consulted this admisssion. Per ID, restarted on cefepime 5/30 - 2 mg q24hr ( pharmacy). If patient were to have mental status changes again, consider stopping cefepime. He will follow-up with ID as scheduled. Gets IV abx at local hospital daily due to insurance issues.      MELD-Na score: 24 at 5/30/2023  5:09 AM  MELD score: 23 at 5/30/2023  5:09 AM  Calculated from:  Serum Creatinine: 2.15 mg/dL at 5/30/2023  5:09 AM  Serum Sodium: 136 mmol/L at 5/30/2023  5:09 AM  Total Bilirubin: 0.6 mg/dL (Using min of 1 mg/dL) at 5/29/2023  5:34 AM  INR(ratio): 2.22 at 5/30/2023  5:09 AM  Age: 67 years     # Hypothyroidism.   Levothyroxine 100 mcg daily (increased 5/2), repeat TSH 8.09/T4 0.97 on 5/21. Repeat TSH ~6/15/23     # Tremor, longstanding, intermittent, happens both at rest and with activity but not consistently. Likely this admission due to acute illness. No asterixis. No intention tremor on exam. No resting tremor on exam. Neurology consult signed off. Consider outpatient follow-up.      # Hypocalcermia, improved.   Corrected Ca about 8.6, no indications for replacement currently. Keep magnesium >2.2, started on Mg Ox.  If corrected CA <8.5, would plan for PTH, vitamin D level and replacement with calcium citrate (better absorbed when on PPI).      # Tropinemia, improved   Troponin 155 on admit, increased from recent baseline. Stable x3. No chest pain or anginal equivalents. EKG without acute ST/T changes. Most  "likely demand ischemia in setting of acute on chronic anemia and hypotension.     Chronic Medical Conditions:   #BPH. Continue PTA meds.   #Gout. Continue allopurinol.    #DM Type II, controlled.   #Mood Disorder.Weaned off Paxil per psych, started Seroquel.   #ABHINAV: CPAP    Siena Aguiar DNP, ANP-C  Advanced Heart Failure/Cardiology 2 Nurse Practitioner  5/31/2023  Pager: [phone]    PHYSICAL EXAM:  Blood pressure (!) 75/62, pulse 91, temperature 97.9  F (36.6  C), temperature source Oral, resp. rate 17, height 1.702 m (5' 7\"), weight 80 kg (176 lb 5.9 oz), SpO2 100 %.    GENERAL: Appears comfortable, in no distress .  HEENT: Eye symmetrical and without discharge or icterus bilaterally. Mucous membranes moist and without lesions.  NECK: Supple, JVD 2cm above clavicle at 90 degrees.   CV: +VAD hum   RESPIRATORY: Respirations regular, even, and unlabored. Lungs CTA throughout.   GI: Soft and non distended with normoactive bowel sounds present in all quadrants. No tenderness, rebound, guarding.   EXTREMITIES: Trace peripheral edema. Non pulsatile.  NEUROLOGIC: Alert and orientated x 3. No focal deficits.   MUSCULOSKELETAL: No joint swelling or tenderness.   SKIN: No jaundice. No rashes or lesions.     LABS:   Last CBC:   Recent Labs   Lab Test 05/31/23  0531   WBC 6.9   RBC 3.17*   HGB 9.0*   HCT 29.7*   MCV 94   MCH 28.4   MCHC 30.3*   RDW 18.8*          Last CMP:  Recent Labs   Lab Test 05/31/23  0531 05/29/23  1543 05/29/23  0534      < > 131*   POTASSIUM 3.1*   < > 5.1   CHLORIDE 100   < > 98   CALOS 8.2*   < > 8.6*   CO2 28   < > 19*   BUN 41.5*   < > 38.2*   CR 2.03*   < > 1.92*   GLC 94   < > 153*   AST  --   --  69*   ALT  --   --  50   BILITOTAL  --   --  0.6   ALBUMIN  --   --  3.7   PROTTOTAL  --   --  7.7   ALKPHOS  --   --  170*    < > = values in this interval not displayed.       IMAGING:  Results for orders placed or performed during the hospital encounter of 05/19/23   CT Head w/o Contrast "    Narrative    EXAM: CT HEAD W/O CONTRAST  LOCATION: [facility]  DATE/TIME: 5/20/2023 1:53 AM CDT    INDICATION: Fall, hit head on 5 19 on warfarin  COMPARISON: None.  TECHNIQUE: Routine CT Head without IV contrast. Multiplanar reformats. Dose reduction techniques were used.    FINDINGS:  INTRACRANIAL CONTENTS: No intracranial hemorrhage, extraaxial collection, or mass effect.  No CT evidence of acute infarct. Mild presumed chronic small vessel ischemic changes. Mild generalized volume loss. No hydrocephalus.     VISUALIZED ORBITS/SINUSES/MASTOIDS: No intraorbital abnormality. No paranasal sinus mucosal disease. No middle ear or mastoid effusion.    BONES/SOFT TISSUES: No acute abnormality.      Impression    IMPRESSION:  1.  No acute intracranial process.   CT Abdomen Pelvis w/o Contrast    Narrative    EXAMINATION: CT ABDOMEN PELVIS W/O CONTRAST, 5/20/2023 12:53 PM    TECHNIQUE:  Helical CT images from the lung bases through the pubic  symphysis were obtained without IV contrast.     COMPARISON: 4/23/2023    HISTORY: r/o retroperitoneal bleed/hematoma    FINDINGS:    Abdomen and pelvis: Nodular hepatic contour. No appreciable focal  hepatic lesion. Tiny stones layering within the gallbladder lumen. No  gallbladder wall thickening. No intra or extrahepatic biliary  dilation. Fatty atrophy of the pancreas. Nonenlarged spleen. Normal  adrenal glands. No appreciable renal cortical lesion. No  hydronephrosis or urinary tract stone. Mild nonobstructive ectasia of  the mid to distal left ureter. Posterior right bladder wall  diverticulum. Nonenlarged prostate. Symmetric seminal vesicles. Stable  small volume ascites. Mild mesenteric and retroperitoneal edema,  similar to prior. No free air. No bowel obstruction or inflammation.  Moderate colonic stool burden. Normal appendix. Mild to moderate  aortoiliac atherosclerotic calcification without aneurysmal dilation.  No  abdominal or pelvic lymphadenopathy.    Lung bases:  Cardiomegaly with stable LVAD. No new abnormality along  the drive line. No pericardial effusion. Trace bilateral pleural  effusions. Mild mosaic attenuation in the lung bases.    Bones and soft tissues: No acute or aggressive osseus abnormality.  Multilevel degenerative change in the spine with grade 1  anterolisthesis at L3-4 and L4-5. Bilateral hip synovial thickening  and calcification with small joint effusions and intra-articular  and/or periarticular ossified bodies. Ascites containing periumbilical  hernia. Fat-containing indirect left inguinal hernia.      Impression    IMPRESSION:   1. No evidence of retroperitoneal hemorrhage or other acute findings  in the abdomen or pelvis.  2. Stable small volume ascites and mild mesenteric/retroperitoneal  edema.  3. Cholelithiasis.  4. Nodular hepatic contour the chest cirrhosis.  5. Trace bilateral pleural effusions, decreased on the right since  4/23/2023.    CHARLES HERNANDEZ DO         SYSTEM ID:  [id number]   XR Chest Port 1 View    Narrative    EXAM: XR CHEST PORT 1 VIEW 5/20/2023 3:21 PM     HISTORY: Assess PICC placement       COMPARISON: Chest radiograph 4/19/2023    FINDINGS:   AP portable semiupright view chest. Left chest wall cardiac device  with intact distal leads. Left ventricular assist device is seen  projecting over the ventricular apex. Postsurgical change of the chest  with intact median sternotomy wires. Right upper chest and a PICC with  the distal tip terminating along the low SVC. Trachea is midline. Mild  perihilar predominant streaky pulmonary opacities. Cardiomediastinal  silhouette and pulmonary vasculature are within normal limits. No  focal airspace opacity, pleural effusion or appreciable pneumothorax.    No acute osseous abnormality. Visualized upper abdomen is  unremarkable.          Impression    IMPRESSION:  1. Right upper extremity PICC with the distal tip terminating along  the low  SVC. Remainder support devices in stable position, as above.  2. Mild perihilar predominant streaky opacities, favoring  atelectasis/edema,    I have personally reviewed the examination and initial interpretation  and I agree with the findings.    DEVON CHARLES MD         SYSTEM ID:  [id number]   CT Head w/o Contrast    Narrative    CT HEAD W/O CONTRAST 5/25/2023 10:40 AM    History: altered mental status/confusion in LVAd patient, please r/o  bleed     Comparison: 5/20/2023    Technique: Using multidetector thin collimation helical acquisition  technique, axial, coronal and sagittal CT images from the skull base  to the vertex were obtained without intravenous contrast.   (topogram) image(s) also obtained and reviewed.    Findings: There is no intracranial hemorrhage, mass effect, or midline  shift. Gray/white matter differentiation in both cerebral hemispheres  is preserved. Basal ganglia calcifications similar to prior.  Ventricles are proportionate to the cerebral sulci. The basal cisterns  are clear.    The bony calvaria and the bones of the skull base are normal. The  visualized portions of the paranasal sinuses and mastoid air cells are  clear.       Impression    Impression:  No acute intracranial pathology.     I have personally reviewed the examination and initial interpretation  and I agree with the findings.    KATHY SEAMAN MD         SYSTEM ID:  [id number]   XR Chest Port 1 View    Narrative    Portable chest    INDICATION: Acute medical syndrome rule out aspiration    COMPARISON: 5/20/2023    FINDINGS: Heart size upper normal. Median sternotomy, left subclavian  transvenous approach implantable cardiac defibrillator and LVAD again  present. No new consolidative opacities. Mild central perihilar patchy  densities unchanged. High riding bilateral humeral heads may indicate  supraspinatus tendon degeneration at both shoulders.      Impression    IMPRESSION: No significant changes with perhaps minimal  central  edema/atelectasis without definite aspiration. No consolidation. LVAD.  Implantable cardiac defibrillator.    AIME GAY MD         SYSTEM ID:  [id number]     *Note: Due to a large number of results and/or encounters for the requested time period, some results have not been displayed. A complete set of results can be found in Results Review.       PROCEDURES:  Colonoscopy  Upper GI endoscopy  EEG    CONSULTATIONS:   Gastroenterology (luminal and hepatology)  RN care coordinator  Social work   Psychiatry  Neurology  Infectious disease    DISCHARGE MEDICATIONS:  Current Discharge Medication List      START taking these medications    Details   ceFEPIme (MAXIPIME) 2 g vial Inject 2 g into the vein every 24 hours      ferrous sulfate (FEROSUL) 325 (65 Fe) MG tablet Take 1 tablet (325 mg) by mouth daily (with breakfast)  Qty: 90 tablet, Refills: 3    Associated Diagnoses: Iron deficiency anemia, unspecified iron deficiency anemia type      pantoprazole (PROTONIX) 40 MG EC tablet Take 1 tablet (40 mg) by mouth 2 times daily (before meals)  Qty: 60 tablet, Refills: 3    Associated Diagnoses: Heart failure, unspecified HF chronicity, unspecified heart failure type (H)      QUEtiapine (SEROQUEL) 25 MG tablet Take 0.5 tablets (12.5 mg) by mouth At Bedtime  Qty: 30 tablet, Refills: 3    Associated Diagnoses: Other insomnia         CONTINUE these medications which have CHANGED    Details   !! hydrochlorothiazide (HYDRODIURIL) 50 MG tablet Take 2 tablets (100 mg) by mouth daily  Qty: 60 tablet, Refills: 3    Associated Diagnoses: Hypervolemia, unspecified hypervolemia type      magnesium oxide (MAG-OX) 400 MG tablet Take 1 tablet (400 mg) by mouth 3 times daily  Qty: 90 tablet, Refills: 3    Associated Diagnoses: LVAD (left ventricular assist device) present (H)      potassium chloride ER (KLOR-CON M) 20 MEQ CR tablet Take 3 tablets (60 mEq) by mouth 3 times daily Take an additional 40 mEq on days you get IV  Bumex  Qty: 774 tablet, Refills: 3    Associated Diagnoses: LVAD (left ventricular assist device) present (H); 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)      warfarin ANTICOAGULANT (COUMADIN) 3 MG tablet Take 1 tablet (3 mg) by mouth daily    Associated Diagnoses: LVAD (left ventricular assist device) present (H); Chronic systolic congestive heart failure (H)       !! - Potential duplicate medications found. Please discuss with provider.      CONTINUE these medications which have NOT CHANGED    Details   allopurinol (ZYLOPRIM) 100 MG tablet Take 2 tablets (200 mg) by mouth daily  Qty: 60 tablet, Refills: 1    Associated Diagnoses: LVAD (left ventricular assist device) present (H)      amiodarone (PACERONE) 200 MG tablet TAKE 1 TABLET BY MOUTH ONCE DAILY  Qty: 90 tablet, Refills: 3    Associated Diagnoses: LVAD (left ventricular assist device) present (H)      co-enzyme Q-10 200 MG CAPS Take 200 mg by mouth daily      empagliflozin (JARDIANCE) 10 MG TABS tablet Take 1 tablet (10 mg) by mouth daily  Qty: 30 tablet, Refills: 0    Associated Diagnoses: ACC/AHA stage D heart failure (H)      finasteride (PROSCAR) 5 MG tablet Take 1 tablet (5 mg) by mouth daily Helps with urinary retention.  Qty: 30 tablet, Refills: 0    Associated Diagnoses: Voiding difficulty      levothyroxine (SYNTHROID/LEVOTHROID) 100 MCG tablet Take 1 tablet (100 mcg) by mouth every morning (before breakfast)  Qty: 30 tablet, Refills: 0    Associated Diagnoses: Hypothyroidism, unspecified type      senna-docusate (SENOKOT-S/PERICOLACE) 8.6-50 MG tablet Take 1 tablet by mouth 2 times daily as needed for constipation      tamsulosin (FLOMAX) 0.4 MG capsule Take 0.8 mg by mouth every evening This med helps with urinary retention  Qty: 30 capsule, Refills: 0    Associated Diagnoses: Voiding difficulty      torsemide (DEMADEX) 100 MG tablet Take 1 tablet (100 mg) by mouth 3 times daily  Qty: 270 tablet, Refills: 3    Associated Diagnoses:  Acute on chronic congestive heart failure, unspecified heart failure type (H); LVAD (left ventricular assist device) present (H)      !! hydrochlorothiazide (HYDRODIURIL) 50 MG tablet Take 2 tablets (100 mg) by mouth daily  Qty: 180 tablet, Refills: 3    Associated Diagnoses: ACC/AHA stage D heart failure (H)       !! - Potential duplicate medications found. Please discuss with provider.      STOP taking these medications       amLODIPine (NORVASC) 5 MG tablet Comments:   Reason for Stopping:         aspirin (ASA) 81 MG EC tablet Comments:   Reason for Stopping:         B Complex-C-Folic Acid (RENAL) 1 MG CAPS Comments:   Reason for Stopping:         hydrALAZINE (APRESOLINE) 100 MG tablet Comments:   Reason for Stopping:         hydrALAZINE (APRESOLINE) 50 MG tablet Comments:   Reason for Stopping:         isosorbide mononitrate (IMDUR) 30 MG 24 hr tablet Comments:   Reason for Stopping:         methocarbamol (ROBAXIN) 500 MG tablet Comments:   Reason for Stopping:         omeprazole (PRILOSEC) 20 MG DR capsule Comments:   Reason for Stopping:         PARoxetine (PAXIL) 10 MG tablet Comments:   Reason for Stopping:         zolpidem (AMBIEN) 5 MG tablet Comments:   Reason for Stopping:               DISCHARGE DISPOSITION: Eliseo Tanner will discharge to home in stable condition.     DISCHARGE INSTRUCTIONS:  Discharge Procedure Orders   INR   Standing Status: Future Standing Exp. Date: 05/31/24     Basic metabolic panel   Standing Status: Future Standing Exp. Date: 05/31/24     Adult GI  Referral - Procedure Only   Standing Status: Future   Referral Priority: Routine: Next available opening Referral Type: Consultation   Requested Specialty: Gastroenterology   Number of Visits Requested: 1     Medication Therapy Management Referral   Referral Priority: Routine Referral Type: Med Therapy Management   Requested Specialty: Pharmacist   Number of Visits Requested: 1     Reason for your hospital stay   Order  Comments: Anemia, weakness, then fluid overload and confusion/altered mental status which resolved     Activity   Order Comments: Your activity upon discharge: activity as tolerated     Order Specific Question Answer Comments   Is discharge order? Yes      Follow Up ([facility])   Order Comments: Follow up with Dr. Cisneros as scheduled    Appointments on [place] and/or [facility] (with [facility] or [facility] provider or service). Call [phone] if you haven't heard regarding these appointments within 7 days of discharge.     Diet   Order Comments: Follow this diet upon discharge: Orders Placed This Encounter      Fluid restriction 2000 ML FLUID      2 Gram Sodium Diet     Order Specific Question Answer Comments   Is discharge order? Yes        75 minutes spent in discharge, including >50% in counseling and coordination of care, medication review and plan of care recommended on follow up. Questions were answered, patient agrees to plan.

## 2023-05-31 NOTE — PROGRESS NOTES
Care Management Discharge Note    Discharge Date: 05/31/2023     Discharge Disposition: Home    Discharge Services:  OP infusion    Discharge DME:  None noted    Discharge Transportation: family or friend will provide    Private pay costs discussed: Not applicable    Does the patient's insurance plan have a 3 day qualifying hospital stay waiver? NA    PAS Confirmation Code: NA  Patient/family educated on Medicare website which has current facility and service quality ratings: NA    Education Provided on the Discharge Plan:  Yes  Persons Notified of Discharge Plans: Patient, bedside nurse, OP infusion  Patient/Family in Agreement with the Plan: Yes    Handoff Referral Completed: Yes    Additional Information:  Per the primary team, patient is medically ready for discharge. Patient will resume OP infusion at [facility] tomorrow at 1om, writer updated the pharmacist and faxed orders at this time. Spouse is present to provide transportation. No additional discharge needs noted.     Discharge resources:  [facility]   Phone: [phone]   Fax: [phone]   Direct line (to be used on the weekend if needed): [phone] Addendum:  Call received from [facility] pharmacist noting that the pt's discharge orders did not include the updated IV bumex orders that writer had previously mention. Writer discussed w/discharging provider, corrected ordered placed and faxed at this time.     Ana Krueger, RNCC, BSN    [facility]    [address] [place] [id number]    [email or web address]  [email or web address]    Office: [phone] Pager: [phone]

## 2023-05-31 NOTE — PROCEDURES
The patient's HeartMate LVAD was interrogated 5/31/2023  * Speed 5800 rpm   * Pulsatility index 2.4-2.7   * Power 4.1 Driver   * Flow 5.2-5.3 L/minute   Fluid status: grossly euvolemic   Alarms were reviewed, and notable for no events.   The driveline exit site was inspected, dressing intact.   All external components were inspected and showed no evidence of damage or malfunction, none replaced.   No changes to VAD settings made

## 2023-05-31 NOTE — PLAN OF CARE
Hours of Care: [id number]    Neuro: A&Ox4. Telida baseline, hearing aids in place. Pt has some slight BUE tremors baseline.   Cardiac: HM3 LVAD, values w/ in parameters, no alarms during shift.  SR/ST w/ BBB. MAPs 78-82.   Respiratory: Sating >95% on RA. Using Cpap (from home) overnight.   GI/: Adequate urine output via urinal bedside. Soft, loose BM X2 during shift, using bedside commode.   Diet/appetite: Tolerating 2g Na diet w/ 2L FR. Eating well.  Activity:  Assist of 1, up to chair, commode and in halls.  Pain: At acceptable level on current regimen. Denies during shift.   Skin: No new deficits noted.  LDA's: R single lumen PICC SL, R PIV SL.     Plan: Possible discharge today. Will continue to monitor and follow POC. Notify primary team with changes.

## 2023-06-01 NOTE — PROGRESS NOTES
[facility]    Background: Transitional Care Management program auto-identified and prompting a chart review by [facility] team.    Assessment: Upon chart review, [facility] Team member will Enroll this episode of Transitional Care Management program due to reason below:    Upon chart review, patient is followed by [facility] team who follow their patients closely. [facility] will not conduct outreach to avoid duplication of outreach to patient.     Plan: Transitional Care Management episode enrolled per reason above.      *Connected Care Resource Team does NOT follow patient ongoing. Referrals are identified based on internal discharge reports and the outreach is to ensure patient has an understanding of their discharge instructions.

## 2023-06-01 NOTE — PLAN OF CARE
Occupational Therapy Discharge Summary    Reason for therapy discharge:    Discharged to home with home therapy.    Progress towards therapy goal(s). See goals on Care Plan in [facility] electronic health record for goal details.  Goals partially met.  Barriers to achieving goals:   discharge from facility.    Therapy recommendation(s):    Continued therapy is recommended.  Rationale/Recommendations:  Home with HH OT/PT.

## 2023-06-01 NOTE — TELEPHONE ENCOUNTER
MTM referral from: Transitions of Care (recent hospital discharge or ED visit)    MTM referral outreach attempt #1 on June 1, 2023 at 11:26 AM      Outcome: Patient is not interested at this time, will route to MTM Pharmacist/Provider as an FYI. Thank you for the referral.     Use VBC ([facility]COL[place]) for the carrier/Plan on the flowsheet    Marjan Salamanca - [facility]

## 2023-06-02 NOTE — PROGRESS NOTES
Situation:   Writer spoke with Patient today to discuss Weight loss/dehydration.     Background:  Weight today: 170 lb. Compared to recent values this weight is decreased.   Patient reported being 172 yesterday and 176 on 5/30 prior to leaving hospital.    Assessment:  CardioMEMs: 32/16 (22)  VAD parameters: Speed: 5800rpm's, Flow: 4.9 l/min, PI: 1.8, Power: 4.9 w  Symptoms:   Heart failure symptoms: no lightheadedness or dizziness.   Alleviating and exacerbating factors: n/a  Other concerning symptoms: none  Applicable medication changes: Patient to start Bumex today. Will confirm with providers if they would like to start today, or hold off until Monday.       Recommendation:  Discussed with Dr. Cisneros. Patient will hold off Bumex until Monday. Patient notified of change, and told to page on-call coordinator if symptoms worsen or with other concerns. Patient verbalized understanding.

## 2023-06-03 NOTE — PROGRESS NOTES
Situation:   Writer spoke with Caregiver today to discuss weight.     Background:  Weight today: 168 lb. Compared to recent values this weight is down 4 pounds from yesterday.    Assessment:  NIBP/MAP: unable  VAD parameters: Speed: 5500rpm's, Flow: 4.9l/min, PI: 2, Power: 5w  Symptoms:   Heart failure symptoms: dizziness, weakness, feels as though his heart is pounding.  Symptoms are a little bit better from yesterday.  Alleviating and exacerbating factors:pt has been taking lots of fluids but still feels dehydrated    Cardiomems:  Has not done mems today, but yesterday dropped from 20 to 16.    Applicable medication changes:   Current diuretic:  Torsemide 100 TID, Hydrochlorothiazide 100mg daily, Bumex 5mg IV Monday Wednesday Friday  Current potassium chloride dose:  Potassium 60mEq TID w/ extra 40mEq on Mondays Wednesdays Fridays  Currently holding bumex til Monday.    Recommendation:  Will discuss with Dr Sotelo.  Instructions given for patient to make no changes to medications today.  Continue to push fluids.    Caregiver notified to page on-call coordinator if symptoms worsen or with other concerns. Caregiver verbalized understanding.

## 2023-06-04 NOTE — PROGRESS NOTES
[facility] - CardioMEMS Reading Review     June 4, 2023, 0930   CardioMems period: 5/18/2023 - 6/16/23        CardioMEMS reviewed and routed to patient's provider, Dr. Sotelo.      Current diuretic:  Torsemide 100 TID, Hydrochlorothiazide 100mg daily, Bumex 5mg IV Monday Wednesday Friday  Current potassium chloride dose:  Potassium 60mEq TID w/ extra 40mEq on Mondays Wednesdays Fridays (this reflects the changes post hospitalization.     Symptoms & Weights:   Went up 2 pounds today to 170.  Continues to have lightheadedness and weakness but no worse than yesterday.      Speed 5800  Flow:   4.9  PI:   4.6  Pwr:  5    PAD 15     Recent change to plan of care today: Holding bumex until Monday per Dr. Cisneros     Current Threshold parameters: 21 - 24     Today's Waveform:        Readings:               [facility] Date Wedge Pressure MEMS PAD during cath  (average of the 3 values)      Sept 20, 2022 w/MEMS implant       15 mmHg    16 mmHg       After speaking w/ Dr. Sotelo, called pt's wife and instructed pt to do mems and weight tomorrow morning and call VAD coordinator on call for further instructions prior to taking meds.  If pt feels worse, he should go to the ED.

## 2023-06-05 NOTE — PROGRESS NOTES
[facility] - CardioMEMS Reading Review    June 5, 2023, 0916   CardioMems period: 5/18/2023 - 6/16/23      CardioMEMS reviewed and routed to patient's provider, Dr. Cisneros.     Current diuretic:  Torsemide 100 TID, Hydrochlorothiazide 100mg daily, Bumex 5mg IV Monday Wednesday Friday  Current potassium chloride dose:  Potassium 60mEq TID w/ extra 40mEq on Mondays Wednesdays Fridays    Symptoms: Fatigue, weakness, skin tenting. Current LVAD numbers, Speed: 5800, Flow: 4.8, PI: 5.8, Power: 4.9  Weights: Today, going back: 171, 170, 168    Changes to plan of care today: HOLD IV Bumex today. HOLD Hydrochlorothiazide today, restart at dose 50mg daily tomorrow. No change to Potassium. Recheck labs Wednesday.  Changes discussed with Eliseo and Veronique/caregiver.  Encouraged pt to push fluids.  Both verbalized understanding of the changes.    Current Threshold parameters: 21 - 24    Today's Waveform:       Readings:         [facility] Date Wedge Pressure MEMS PAD during cath  (average of the 3 values)     Sept 20, 2022 w/MEMS implant     15 mmHg   16 mmHg

## 2023-06-06 NOTE — PROGRESS NOTES
D:  Weight not improved today; currently 171. Mems reading down to 14 from 15 yesterday, goal 21. Pt reports feeling about the same.  I:  Discussed with Dr. Cisneros.  Pt already took AM Torsemide and hydrochlorothiazide 50mg.  Pt to hold 2 doses Torsemide today.  Pt to hold meds in AM until directed by VAD coordinator.  Labs drawn today and will be re-drawn Thursday.  A:  Follow up  P:  Pt verbalized understanding of the instructions given.  Will call VAD coordinator with further needs and questions.

## 2023-06-06 NOTE — NURSING NOTE
Is the patient currently in the state of [place]? YES    Visit mode:VIDEO    If the visit is dropped, the patient can be reconnected by: VIDEO VISIT: Text to cell phone: [phone]    Will anyone else be joining the visit? NO      How would you like to obtain your AVS? MyChart    Are changes needed to the allergy or medication list? NO    Reason for visit: RECHJESSENIA DOMINGUEZ

## 2023-06-06 NOTE — PROGRESS NOTES
Virtual Visit Details    Type of service:  Video Visit   Video Start Time: 4:00PM  Video End Time:4:15 PM    Originating Location (pt. Location): Home    Distant Location (provider location):  On-site  Platform used for Video Visit: Mary    Infectious Diseases LVAD Clinic Note  06/06/2023    Chief Complaint:  LVAD infection    Recommendations:   1. Continue renally dosed cefepime 2 grams IV q 24 hours (covers MSSA and PsA).  He continues to go to an infusion center daily for cefepime.  2. Cefepime dose will need to be adjusted for his renal dysfunction. Creatinine clearance around ~ 30. Although it fluctuates above 30 given the variability of it with frequent diuretic dose changes will keep at daily intervals. If creatinine clearance were to drop < 11 then the dose would need to be lowered.  3. Anticipate a indefinite duration    4. Pending his clinical status we could consider phage therapy in the future - 5/27/23 culture negative. We need culture positivity to develop phages.  5. Weekly CBC with diff, CMP  6. 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      LVAD History:  Current LVAD model: History of NICM s/p HM III  Date current LVAD placed: 2/18/20  Previous LVAD devices: none  Other prosthetic devices/materials:  ICD 3/16/20, Mems 8/2022  Primary cardiologist: Dr. Cisneros  Primary ID provider: Magy    History of bacteremias (dates and organisms):   --History of pre-op Proteus and Enterococcus bacteremia  --MSSA bacteremia secondary to MSSA driveline infection (11/2020). Previously on cefadroxil or  cephalexin for suppression.    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 Increased drainage and fluid collection in 12/2022 s/p I&D of fluid 12/11/22; 12/11/22 Cx grew MSSA (tetra-R). 12/7 outside culture with both MRSA and MSSA. He was placed on daptomycin and attempted to transition to Bactrim suppression in 3/2023 but developed BERNARDA so has remained on Daptomycin. 3/2/23 cx grew Pseudomonas x2 (pan-S) and MSSA x2 strains (both R: tetracycline; 1 with high daptomycin DONTA). Ciprofloxacin was added to daptomycin due to increased drainage. 3/24/23 cultures grew Pseudomonas x3 strains (all FQ resistant). Although cipro resistant Pseudomonas but since he had clinically improved cipro was continued. CT (4/23/23) with soft tissue thickening along driveline but no abscess (no surgical intervention).  5/11/23 cx again grew 2 strains of PsA resistant to FQ. Changed cipro to renally dosed cefepime. Since MRSA was not isolated and previously was only isolated once (12/2022) we discontinued daptomycin. Last culture on 5/27/23 did not have growth.     History of other pertinent infections:   --Hx severe Legionella pneumonia (serogroup 1L) c/b cardiogenic shock, renal failure requiring CRRT, and resp failure requiring intubation, s/p azithromycin    History of driveline area irritation and current mitigation strategies:  local wound care center using vashe nzt 5.5 wound solution - soaking a hydrofera blue sponge and covering with mepilex. Changing dressing daily.     Current suppressive antibiotics: cefepime  Previous antibiotic failures/allergies/intolerances etc: cephalexin, cefadroxil, daptomycin, ciprofloxacin    HPI:   Hospitalized 5/20-5/31 after a fall and drop in Hgb. Colonoscopy and EGD performed 5/22/23. Numerous polyps found. Required transfusions. Cefepime was stopped  transiently due to AMS but then resumed. Last seen by ID on 5/31/23. Resumed cefepime after it was determined that it was not likely the cefepime causing AMS. Creatinine 2.20. LFTs stable. Hgb improved.   5/27 LVAD culture was negative.   Since being discharged from the hospital he has felt weak. Confusion resolved. He was told they are concerned that that he is too dry so they are adjusting his diuretics. The VAD site has had less discharge and overall the wife feels like it looks good. He has a difficult time hearing so majority of questions are answered by his wife. He denies fevers, chills, or VAD exit site erythema. Wound culture from the hospital has had no growth. Wife was present when culture was obtained.     Patient Active Problem List   Diagnosis     Acute on chronic systolic congestive heart failure (H)     Anxiety     CKD (chronic kidney disease) stage 3, GFR 30-59 ml/min (H)     Coronary artery disease involving native coronary artery of native heart without angina pectoris     GERD (gastroesophageal reflux disease)     Gout     Hyperlipidemia with target LDL less than 100     Nonischemic cardiomyopathy (H)     Non-nephrotic range proteinuria     ABHINAV on CPAP     Type 2 diabetes mellitus with stage 3 chronic kidney disease, without long-term current use of insulin (H)     Heart failure (H)     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)     Cardiac arrest (H)     LVAD (left ventricular assist device) present (H)     Chronic systolic congestive heart failure (H)     CKD (chronic kidney disease) stage 4, GFR 15-29 ml/min (H)     Bacteremia     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)     Paroxysmal atrial fibrillation (H)     Wound infection     ACC/AHA stage D heart failure (H)     Stage 3b chronic kidney disease (H)     Mixed hyperlipidemia     Benign essential hypertension     Dizziness     Syncope     Tachyarrhythmia     Acute kidney injury (BERNARDA)  with acute tubular necrosis (ATN) (H)     Iron deficiency anemia, unspecified iron deficiency anemia type     Hypervolemia, unspecified hypervolemia type     Acute on chronic congestive heart failure, unspecified heart failure type (H)     Hypervolemia     Anemia, unspecified type       Allergies:  Allergies   Allergen Reactions     Heparin      HIT screen positive 2/14/20, reflex DAVINA negative; however heme recommended treating as if positive  HIT screen negative 2/11/20     Chlorhexidine Rash     Oxycodone Other (See Comments) and Itching       Medications:  Current Outpatient Medications   Medication Sig Dispense Refill     allopurinol (ZYLOPRIM) 100 MG tablet Take 2 tablets (200 mg) by mouth daily 60 tablet 1     amiodarone (PACERONE) 200 MG tablet TAKE 1 TABLET BY MOUTH ONCE DAILY 90 tablet 3     bumetanide (BUMEX) 0.25 MG/ML injection Inject 20 mLs (5 mg) into the vein Every Mon, Wed, Fri Morning 20 mL 3     ceFEPIme (MAXIPIME) 2 g vial Inject 2 g into the vein every 24 hours       co-enzyme Q-10 200 MG CAPS Take 200 mg by mouth daily       empagliflozin (JARDIANCE) 10 MG TABS tablet Take 1 tablet (10 mg) by mouth daily 30 tablet 0     ferrous sulfate (FEROSUL) 325 (65 Fe) MG tablet Take 1 tablet (325 mg) by mouth daily (with breakfast) 90 tablet 3     finasteride (PROSCAR) 5 MG tablet Take 1 tablet (5 mg) by mouth daily Helps with urinary retention. 30 tablet 0     hydrochlorothiazide (HYDRODIURIL) 50 MG tablet Take 1 tablet (50 mg) by mouth daily 90 tablet 3     hydrochlorothiazide (HYDRODIURIL) 50 MG tablet Take 2 tablets (100 mg) by mouth daily 180 tablet 3     levothyroxine (SYNTHROID/LEVOTHROID) 100 MCG tablet Take 1 tablet (100 mcg) by mouth every morning (before breakfast) 30 tablet 0     magnesium oxide (MAG-OX) 400 MG tablet Take 1 tablet (400 mg) by mouth 3 times daily 90 tablet 3     pantoprazole (PROTONIX) 40 MG EC tablet Take 1 tablet (40 mg) by mouth 2 times daily (before meals) 60 tablet 3      potassium chloride ER (KLOR-CON M) 20 MEQ CR tablet Take 3 tablets (60 mEq) by mouth 3 times daily Take an additional 40 mEq on days you get IV Bumex 774 tablet 3     QUEtiapine (SEROQUEL) 25 MG tablet Take 0.5 tablets (12.5 mg) by mouth At Bedtime 30 tablet 3     senna-docusate (SENOKOT-S/PERICOLACE) 8.6-50 MG tablet Take 1 tablet by mouth 2 times daily as needed for constipation       tamsulosin (FLOMAX) 0.4 MG capsule Take 0.8 mg by mouth every evening This med helps with urinary retention 30 capsule 0     torsemide (DEMADEX) 100 MG tablet Take 1 tablet (100 mg) by mouth 3 times daily 270 tablet 3     warfarin ANTICOAGULANT (COUMADIN) 3 MG tablet Take 1 tablet (3 mg) by mouth daily         Exam:  There were no vitals taken for this visit. video visit  Gen: Chronically ill appearing.No acute no distress. Wife present as well.   Psych: Normal affect.   Eyes: sclera w/o jaundice  Neuro: Alert and oriented.     Labs:  WBC   Date Value Ref Range Status   03/19/2021 7.9 10^9/L Final     WBC Count   Date Value Ref Range Status   05/31/2023 6.9 4.0 - 11.0 10e3/uL Final       CRP Inflammation   Date Value Ref Range Status   08/17/2021 4.9 0.0 - 8.0 mg/L Final   02/16/2021 270.0 (H) 0.0 - 8.0 mg/L Final   02/15/2021 270.0 (H) 0.0 - 8.0 mg/L Final   02/11/2021 8.6 0.0 - 10.0 mg/L Final       Creatinine   Date Value Ref Range Status   05/31/2023 2.03 (H) 0.67 - 1.17 mg/dL Final   05/30/2023 1.96 (H) 0.67 - 1.17 mg/dL Final   05/30/2023 2.15 (H) 0.67 - 1.17 mg/dL Final   04/09/2021 1.6 mg/dL Final   03/19/2021 2.1 mg/dL Final   03/17/2021 2.00 (H) 0.66 - 1.25 mg/dL Final       Culture Micro   Date Value Ref Range Status   02/17/2021 No growth  Final   02/17/2021 No growth  Final   02/16/2021 Light growth  Enterococcus faecium   (A)  Final   02/16/2021 (A)  Final    Legionella pneumophila  isolated  Sent to MD for serotyping     02/16/2021   Final    Critical Value/Significant Value, preliminary result only, called to and  read back by  Shobha Pedro RN on 21 at 1401. bw     2021 (A)  Final    [facility]:  Legionella pneumophila serogroup 1  This test was developed and its performance characteristics determined by the [facility].  It has not been cleared or approved by the U.[place]. Food and Drug   Administration:21CFR 809.30(e).  The FDA has determined that such clearance is not   necessary.         Culture   Date Value Ref Range Status   2023 No Growth  Final   2023 No Growth  Final   2023 No Growth  Final   2023 3+ Pseudomonas aeruginosa (A)  Final   2023 2+ Pseudomonas aeruginosa (A)  Final       Imaging:  EEG Video 2-12 HRS Ummonitored  EEG Video 2-12 HRS Ummonitored Result    VIDEO EEG DATE: 2023  VIDEO EEG [id number]  VIDEO EEG DAY#: 2  VIDEO EEG SOURCE FILE DURATION: 4 hours 16 min    PATIENT INFORMATION: Eliseo Tanner is a 67 year old male with history of   chronic systolic heart failure, nonischemic cardiomyopathy, coronary   artery disease, hypertension, ABHINAV, DM 2, CKD 3, HM 3 on 2020 course   C/B retrosternal hematoma, pulmonary hemorrhage, RV failure, VT, atrial   fibrillation who presented with significant generalized fatigue,   orthostatic symptoms, and recurrent falls.  He had changes in personality   and possible word findings difficulty.  EEG is being done to evaluate for   seizures.     MEDICATIONS:    Paxil 20 mg od  Ambien 5 mg hs  These medications and doses were derived from the medical record at the   time of this procedure.     TECHNICAL SUMMARY: This is a video-EEG monitoring study. EEG was recorded   from 23 scalp electrodes placed according to the 10-20 international   system. Additional electrodes were utilized for referencing, artifact   detection, and recording from other cerebral regions. Qualified   technicians attached EEG electrodes, set up the study, monitored and   reviewed EEG recordings,  and disconnected EEG electrodes when appropriate.   Video was continuously recorded. Video was reviewed for clinical   correlation and to assist with EEG interpretations.     BACKGROUND ACTIVITY:  During wakefulness, there was poorly formed poorly   sustained 7 to 8 Hz activity over the posterior head regions which was   symmetric and reactive.  Diffuse theta delta slowing was present during   waking.  Stage II sleep was recorded in which reflexes are symmetric and   semispinalis were identified.   Significant myogenic artifact was seen   specially over anterior leads, which may obscure subtle focal slowing.       ACTIVATION PROCEDURE: Was not performed.     INTERICTAL EPILEPTIFORM DISCHARGES: No epileptiform discharges were   present.     ICTAL: No clinical events or electrographic seizures were recorded. Video   was reviewed intermittently by EEG technologist and physician for clinical   seizures.      IMPRESSION OF VIDEO EEG DAY # 2: This video electroencephalogram is   abnormal due to the presence of diffuse theta delta slowing, consistent   with moderate diffuse nonspecific encephalopathy.  No electrographic   seizures or epileptiform discharges were recorded. Clinical correlation is   advised.     Shoshana Dangelo MD  EPILEPSY STAFF

## 2023-06-06 NOTE — LETTER
6/6/2023       RE: Eliseo Tanner  [address] [place] [address]     Dear Colleague,    Thank you for referring your patient, Eliseo Tanner, to the [facility] INFECTIOUS DISEASE CLINIC [place] at [facility]. Please see a copy of my visit note below.    Virtual Visit Details    Type of service:  Video Visit   Video Start Time: 4:00PM  Video End Time:4:15 PM    Originating Location (pt. Location): Home    Distant Location (provider location):  On-site  Platform used for Video Visit: [facility]    Infectious Diseases LVAD Clinic Note  06/06/2023    Chief Complaint:  LVAD infection    Recommendations:   Continue renally dosed cefepime 2 grams IV q 24 hours (covers MSSA and PsA).  He continues to go to an infusion center daily for cefepime.  Cefepime dose will need to be adjusted for his renal dysfunction. Creatinine clearance around ~ 30. Although it fluctuates above 30 given the variability of it with frequent diuretic dose changes will keep at daily intervals. If creatinine clearance were to drop < 11 then the dose would need to be lowered.  Anticipate a indefinite duration    Pending his clinical status we could consider phage therapy in the future - 5/27/23 culture negative. We need culture positivity to develop phages.  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      LVAD History:  Current LVAD model: History of NICM s/p HM III  Date current LVAD placed: 2/18/20  Previous LVAD devices: none  Other prosthetic  devices/materials:  ICD 3/16/20, Mems 8/2022  Primary cardiologist: Dr. Cisneros  Primary ID provider: Magy    History of bacteremias (dates and organisms):   --History of pre-op Proteus and Enterococcus bacteremia  --MSSA bacteremia secondary to MSSA driveline infection (11/2020). Previously on cefadroxil or cephalexin for suppression.    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 Increased drainage and fluid collection in 12/2022 s/p I&D of fluid 12/11/22; 12/11/22 Cx grew MSSA (tetra-R). 12/7 outside culture with both MRSA and MSSA. He was placed on daptomycin and attempted to transition to Bactrim suppression in 3/2023 but developed BERNARDA so has remained on Daptomycin. 3/2/23 cx grew Pseudomonas x2 (pan-S) and MSSA x2 strains (both R: tetracycline; 1 with high daptomycin DONTA). Ciprofloxacin was added to daptomycin due to increased drainage. 3/24/23 cultures grew Pseudomonas x3 strains (all FQ resistant). Although cipro resistant Pseudomonas but since he had clinically improved cipro was continued. CT (4/23/23) with soft tissue thickening along driveline but no abscess (no surgical intervention).  5/11/23 cx again grew 2 strains of PsA resistant to FQ. Changed cipro to renally dosed cefepime. Since MRSA was not isolated and previously was only isolated once (12/2022) we discontinued daptomycin. Last culture on 5/27/23 did not have growth.     History of other pertinent infections:   --Hx severe Legionella pneumonia (serogroup 1L) c/b cardiogenic shock, renal failure requiring CRRT, and resp failure requiring intubation, s/p azithromycin    History of driveline area irritation and current mitigation strategies:  local wound care center using vashe nzt 5.5 wound solution - soaking a hydrofera blue sponge and covering with mepilex. Changing dressing daily.     Current  suppressive antibiotics: cefepime  Previous antibiotic failures/allergies/intolerances etc: cephalexin, cefadroxil, daptomycin, ciprofloxacin    HPI:   Hospitalized 5/20-5/31 after a fall and drop in Hgb. Colonoscopy and EGD performed 5/22/23. Numerous polyps found. Required transfusions. Cefepime was stopped transiently due to AMS but then resumed. Last seen by ID on 5/31/23. Resumed cefepime after it was determined that it was not likely the cefepime causing AMS. Creatinine 2.20. LFTs stable. Hgb improved.   5/27 LVAD culture was negative.   Since being discharged from the hospital he has felt weak. Confusion resolved. He was told they are concerned that that he is too dry so they are adjusting his diuretics. The VAD site has had less discharge and overall the wife feels like it looks good. He has a difficult time hearing so majority of questions are answered by his wife. He denies fevers, chills, or VAD exit site erythema. Wound culture from the hospital has had no growth. Wife was present when culture was obtained.     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)    Cardiac arrest (H)    LVAD (left ventricular assist device) present (H)    Chronic systolic congestive heart failure (H)    CKD (chronic kidney disease) stage 4, GFR 15-29 ml/min (H)    Bacteremia    Infection associated with driveline of left ventricular assist  device (LVAD) (H)    Paroxysmal atrial fibrillation (H)    Wound infection    ACC/AHA stage D heart failure (H)    Stage 3b chronic kidney disease (H)    Mixed hyperlipidemia    Benign essential hypertension    Dizziness    Syncope    Tachyarrhythmia    Acute kidney injury (BERNARDA) with acute tubular necrosis (ATN) (H)    Iron deficiency anemia, unspecified iron deficiency anemia type    Hypervolemia, unspecified hypervolemia type    Acute on chronic congestive heart failure, unspecified heart failure type (H)    Hypervolemia    Anemia, unspecified type       Allergies:  Allergies   Allergen Reactions    Heparin      HIT screen positive 2/14/20, reflex DAVINA negative; however heme recommended treating as if positive  HIT screen negative 2/11/20    Chlorhexidine Rash    Oxycodone Other (See Comments) and Itching       Medications:  Current Outpatient Medications   Medication Sig Dispense Refill    allopurinol (ZYLOPRIM) 100 MG tablet Take 2 tablets (200 mg) by mouth daily 60 tablet 1    amiodarone (PACERONE) 200 MG tablet TAKE 1 TABLET BY MOUTH ONCE DAILY 90 tablet 3    bumetanide (BUMEX) 0.25 MG/ML injection Inject 20 mLs (5 mg) into the vein Every Mon, Wed, Fri Morning 20 mL 3    ceFEPIme (MAXIPIME) 2 g vial Inject 2 g into the vein every 24 hours      co-enzyme Q-10 200 MG CAPS Take 200 mg by mouth daily      empagliflozin (JARDIANCE) 10 MG TABS tablet Take 1 tablet (10 mg) by mouth daily 30 tablet 0    ferrous sulfate (FEROSUL) 325 (65 Fe) MG tablet Take 1 tablet (325 mg) by mouth daily (with breakfast) 90 tablet 3    finasteride (PROSCAR) 5 MG tablet Take 1 tablet (5 mg) by mouth daily Helps with urinary retention. 30 tablet 0    hydrochlorothiazide (HYDRODIURIL) 50 MG tablet Take 1 tablet (50 mg) by mouth daily 90 tablet 3    hydrochlorothiazide (HYDRODIURIL) 50 MG tablet Take 2 tablets (100 mg) by mouth daily 180 tablet 3    levothyroxine (SYNTHROID/LEVOTHROID) 100 MCG tablet Take 1 tablet (100 mcg) by mouth every  morning (before breakfast) 30 tablet 0    magnesium oxide (MAG-OX) 400 MG tablet Take 1 tablet (400 mg) by mouth 3 times daily 90 tablet 3    pantoprazole (PROTONIX) 40 MG EC tablet Take 1 tablet (40 mg) by mouth 2 times daily (before meals) 60 tablet 3    potassium chloride ER (KLOR-CON M) 20 MEQ CR tablet Take 3 tablets (60 mEq) by mouth 3 times daily Take an additional 40 mEq on days you get IV Bumex 774 tablet 3    QUEtiapine (SEROQUEL) 25 MG tablet Take 0.5 tablets (12.5 mg) by mouth At Bedtime 30 tablet 3    senna-docusate (SENOKOT-S/PERICOLACE) 8.6-50 MG tablet Take 1 tablet by mouth 2 times daily as needed for constipation      tamsulosin (FLOMAX) 0.4 MG capsule Take 0.8 mg by mouth every evening This med helps with urinary retention 30 capsule 0    torsemide (DEMADEX) 100 MG tablet Take 1 tablet (100 mg) by mouth 3 times daily 270 tablet 3    warfarin ANTICOAGULANT (COUMADIN) 3 MG tablet Take 1 tablet (3 mg) by mouth daily         Exam:  There were no vitals taken for this visit. video visit  Gen: Chronically ill appearing.No acute no distress. Wife present as well.   Psych: Normal affect.   Eyes: sclera w/o jaundice  Neuro: Alert and oriented.     Labs:  WBC   Date Value Ref Range Status   03/19/2021 7.9 10^9/L Final     WBC Count   Date Value Ref Range Status   05/31/2023 6.9 4.0 - 11.0 10e3/uL Final       CRP Inflammation   Date Value Ref Range Status   08/17/2021 4.9 0.0 - 8.0 mg/L Final   02/16/2021 270.0 (H) 0.0 - 8.0 mg/L Final   02/15/2021 270.0 (H) 0.0 - 8.0 mg/L Final   02/11/2021 8.6 0.0 - 10.0 mg/L Final       Creatinine   Date Value Ref Range Status   05/31/2023 2.03 (H) 0.67 - 1.17 mg/dL Final   05/30/2023 1.96 (H) 0.67 - 1.17 mg/dL Final   05/30/2023 2.15 (H) 0.67 - 1.17 mg/dL Final   04/09/2021 1.6 mg/dL Final   03/19/2021 2.1 mg/dL Final   03/17/2021 2.00 (H) 0.66 - 1.25 mg/dL Final       Culture Micro   Date Value Ref Range Status   02/17/2021 No growth  Final   02/17/2021 No growth  Final    2021 Light growth  Enterococcus faecium   (A)  Final   2021 (A)  Final    Legionella pneumophila  isolated  Sent to [facility] for serotyping     2021   Final    Critical Value/Significant Value, preliminary result only, called to and read back by  Shobha Pedro RN on 21 at 1401. bw     2021 (A)  Final    [facility]:  Legionella pneumophila serogroup 1  This test was developed and its performance characteristics determined by the [facility].  It has not been cleared or approved by the U.S. Food and Drug   Administration:21CFR 809.30(e).  The FDA has determined that such clearance is not   necessary.         Culture   Date Value Ref Range Status   2023 No Growth  Final   2023 No Growth  Final   2023 No Growth  Final   2023 3+ Pseudomonas aeruginosa (A)  Final   2023 2+ Pseudomonas aeruginosa (A)  Final       Imaging:  EEG Video 2-12 HRS Ummonitored  EEG Video 2-12 HRS Ummonitored Result    VIDEO EEG DATE: 2023  VIDEO EEG [id number]  VIDEO EEG DAY#: 2  VIDEO EEG SOURCE FILE DURATION: 4 hours 16 min    PATIENT INFORMATION: Eliseo Tanner is a 67 year old male with history of   chronic systolic heart failure, nonischemic cardiomyopathy, coronary   artery disease, hypertension, ABHINAV, DM 2, CKD 3, HM 3 on 2020 course   C/B retrosternal hematoma, pulmonary hemorrhage, RV failure, VT, atrial   fibrillation who presented with significant generalized fatigue,   orthostatic symptoms, and recurrent falls.  He had changes in personality   and possible word findings difficulty.  EEG is being done to evaluate for   seizures.     MEDICATIONS:    Paxil 20 mg od  Ambien 5 mg hs  These medications and doses were derived from the medical record at the   time of this procedure.     TECHNICAL SUMMARY: This is a video-EEG monitoring study. EEG was recorded   from 23 scalp electrodes placed according to the 10-20  international   system. Additional electrodes were utilized for referencing, artifact   detection, and recording from other cerebral regions. Qualified   technicians attached EEG electrodes, set up the study, monitored and   reviewed EEG recordings, and disconnected EEG electrodes when appropriate.   Video was continuously recorded. Video was reviewed for clinical   correlation and to assist with EEG interpretations.     BACKGROUND ACTIVITY:  During wakefulness, there was poorly formed poorly   sustained 7 to 8 Hz activity over the posterior head regions which was   symmetric and reactive.  Diffuse theta delta slowing was present during   waking.  Stage II sleep was recorded in which reflexes are symmetric and   semispinalis were identified.   Significant myogenic artifact was seen   specially over anterior leads, which may obscure subtle focal slowing.       ACTIVATION PROCEDURE: Was not performed.     INTERICTAL EPILEPTIFORM DISCHARGES: No epileptiform discharges were   present.     ICTAL: No clinical events or electrographic seizures were recorded. Video   was reviewed intermittently by EEG technologist and physician for clinical   seizures.      IMPRESSION OF VIDEO EEG DAY # 2: This video electroencephalogram is   abnormal due to the presence of diffuse theta delta slowing, consistent   with moderate diffuse nonspecific encephalopathy.  No electrographic   seizures or epileptiform discharges were recorded. Clinical correlation is   advised.     Shoshana Dangelo MD  EPILEPSY STAFF

## 2023-06-06 NOTE — PROGRESS NOTES
ANTICOAGULATION MANAGEMENT     Eliseo Tanner 67 year old male is on warfarin with therapeutic INR result. (Goal INR 1.7-2.3)    Recent labs: (last 7 days)     06/06/23  0000   INR 2.3*       ASSESSMENT       Source(s): Chart Review    Previous INR was Supratherapeutic    Medication, diet, health changes since last INR chart reviewed; none identified             PLAN     Unable to reach Eliseo today.    Left voice message asking if patient took Warfarin 3mg daily and to continue this. Request a call back regarding dosing patient has been taking. Patient was recently discharged from the hospital and suppose to get an INR on 6/1 which was not done.    Follow up required to confirm warfarin dose taken and assess for changes    Luiza Perkins RN  Anticoagulation Clinic  6/6/2023

## 2023-06-07 NOTE — LETTER
Mechanical Circulatory Support Program  [place] [address], [facility] [address] [place] [address]  [phone] Office Phone  [phone] Fax Number    Faxed to:  [facility]  Fax Number:  [phone]      Patient Name: Eliseo Tanner   : 1955   Diagnosis/ICD-10: Heart Failure, unspecified I50.9; LVAD Z95.811   Requesting Physician: Dr. Nader Cisneros   Date of Request: 23   Date to Draw Today                           Please fax results to [phone]       or email to [email or web address]                              Call [phone] with Questions  ORDER TEST   X Complete Metabolic Panel   X Complete Blood Count    Lactate Dehydrogenase    INR             Nader Zepeda MD  Heart Failure, Mechanical Circulatory Support and Transplant Cardiology   of Medicine,  Division of Cardiology, [facility]

## 2023-06-07 NOTE — PROGRESS NOTES
ANTICOAGULATION MANAGEMENT     Eliseo Tanner 67 year old male is on warfarin with therapeutic INR result. (Goal INR 1.7-2.3)    Recent labs: (last 7 days)     06/06/23  0000   INR 2.3*       ASSESSMENT       Source(s): Patient/Caregiver Call       Warfarin doses taken: More warfarin taken than planned which may be affecting INR. Patient reports that he didn't know that his discharge paperwork said to take 3mg daily and that he only has 4mg tablets and cannot make 3mg daily.    Diet: No new diet changes identified    Medication/supplement changes: None noted    New illness, injury, or hospitalization: Yes recently discharged due to anemia, weakness, and fluid overload.    Signs or symptoms of bleeding or clotting: No    Previous result: Supratherapeutic    Additional findings: Will put patient back on previous maintenance dose of 2mg every Wed and 4mg all other days. Patient reports he will have labs next week.         PLAN     Recommended plan for no diet, medication or health factor changes affecting INR     Dosing Instructions: Continue your current warfarin dose with next INR in 1 week       Summary  As of 6/6/2023    Full warfarin instructions:  2 mg every Wed; 4 mg all other days   Next INR check:  6/14/2023             Telephone call with Eliseo who verbalizes understanding and agrees to plan and who agrees to plan and repeated back plan correctly    Patient using outside facility for labs    Education provided:     None required    Plan made per [facility] anticoagulation protocol and per LVAD protocol    Luiza Perkins RN  Anticoagulation Clinic  6/7/2023    _______________________________________________________________________     Anticoagulation Episode Summary     Current INR goal:  1.7-2.3   TTR:  48.5 % (9.4 mo)   Target end date:  Indefinite   Send INR reminders to:  ANTICOAG LVAD    Indications    LVAD (left ventricular assist device) present (H) [Z95.811]  Chronic systolic congestive heart failure (H)  [I50.22]  Paroxysmal atrial fibrillation (H) [I48.0]           Comments:  Follow VAD Anticoag protocol:Yes: HeartMate 3   Bridging: Call MD each time   Date VAD placed: 2/18/2020 5/6/22 [employer] [facility]         Anticoagulation Care Providers     Provider Role Specialty Phone number    Nader Cisneros MD Referring Cardiovascular Disease [phone]

## 2023-06-07 NOTE — PROGRESS NOTES
D:  Weight today 171, no change. Mems PAD today 18 from 14.  Pt symptoms unchanged.  VAD numbers unchanged  I:   Discussed with Dr. Cisneros.  Plan: HOLD IV Bumex today.  Take Torsemide and Hydrochlorothiazide as ordered.  Pt will get labs tomorrow again.  Pt to call with weights tomorrow.  A:  Follow up  P:  Pt verbalized understanding of the instructions given.  Will call VAD coordinator with further needs and questions.

## 2023-06-08 NOTE — PROGRESS NOTES
[facility] CORE [facility] - CardioMEMS Reading Review    June 8, 2023, 1414   CardioMems period: 5/18/2023 - 6/16/23      CardioMEMS reviewed and routed to patient's provider, Dr. Cisneors.     Current diuretic:  Torsemide 100 TID, Hydrochlorothiazide 50mg daily, Bumex 5mg IV Monday Wednesday Friday  Current potassium chloride dose:  Potassium 60mEq TID w/ extra 40mEq on Mondays Wednesdays Fridays    Symptoms: Ongoing fatigue and weakness, mild lightheadedness at times.  Weights: Today, going back: 171, 171, 171  Labs received and reviewed    Changes to plan of care today: Continue to HOLD IV Bumex.  HOLD Hydrochlorothiazide.  Continue to take Torsemide 100mg TID.  INCREASE Potassium to 80 / 80 / 60 mEq daily.  Keep this regimen through the weekend.  Recheck BMP, weights and Mems Monday.  Pt to call if weight reaches 182 to reassess.    Current Threshold parameters: 21 - 24    Today's Waveform:       Readings:         C Date Wedge Pressure MEMS PAD during cath  (average of the 3 values)     Sept 20, 2022 w/MEMS implant     15 mmHg   16 mmHg

## 2023-06-08 NOTE — PATIENT INSTRUCTIONS
Continue renally dosed cefepime 2 grams IV q 24 hours, Cefepime dose needs for his renal function. Given the variability will keep at current dose.  Weekly CBC with diff, CMP  Follow up in LVAD clinic in 4-6 weeks

## 2023-06-08 NOTE — TELEPHONE ENCOUNTER
[facility] Call Center    Phone Message    May a detailed message be left on voicemail: yes     Reason for Call: Order(s): Other:   Reason for requested: bmp needed  Date needed: [id number]  Provider name: M[facility] please fax orders to [phone]      Action Taken: Other: cardio    Travel Screening: Not Applicable

## 2023-06-08 NOTE — LETTER
Faxed to:  [facility]  Fax Number:  [phone]                                                                   STANDING BMP      Patient Name: Eliseo Tanner    1955   Diagnosis/ICD-9: Heart Failure, unspecified I50.9; LVAD Z95.811   Requesting Physician: Dr. Nader Cisneros   Date of Request: 23     Date ORDERS   23 Standing order for BMP draws.   Quantity: 100  Expires in 1 year  Please fax results to [phone]   For questions please call [phone]        Signed,      Nader Cisneros MD  Heart Failure, Mechanical Circulatory Support and Transplant Cardiology   of Medicine,  Division of Cardiology, [facility]

## 2023-06-12 NOTE — PROGRESS NOTES
[facility] CORE [facility] - CardioMEMS Reading Review     June 12, 2023, 0830   CardioMems period: 5/18/2023 - 6/16/23        CardioMEMS reviewed and routed to patient's provider, Dr. Cisneros.      Current diuretic:  Torsemide 100 TID.   (Holding: Hydrochlorothiazide 50mg daily, Bumex 5mg IV Monday Wednesday Friday)  Current potassium chloride dose:  Potassium to 80 / 80 / 60 mEq daily     Symptoms: Occasional dizziness, no change   Weights: Today, going back: 183, 180, 180, 179, 171, 171, 171  Labs received and reviewed     Changes to plan of care today: Continue to HOLD IV Bumex.  RESTART Hydrochlorothiazide @ 75mg daily.  Continue to take Torsemide 100mg TID.  No changes to Potassium.  Recheck labs prior to appt in [place].      Current Threshold parameters: 21 - 24     Today's Waveform:       Readings:      [facility] Date Wedge Pressure MEMS PAD during cath  (average of the 3 values)      Sept 20, 2022 w/MEMS implant       15 mmHg    16 mmHg

## 2023-06-12 NOTE — LETTER
Mechanical Circulatory Support Program  [place] [address], [facility] [address]  [address] [place] [address]  [phone] Office Phone  [phone] Fax Number    Faxed to:  [facility]   Fax Number:  [phone]      Patient Name: Eliseo Tanner   : 1955   Diagnosis/ICD-10: Heart Failure, unspecified I50.9; LVAD Z95.811   Requesting Physician: Dr. Nader Cisneros   Date of Request: 23   Date to Draw , prior to appointment                           Please fax results to [phone]       or email to [email or web address]                              Call [phone] with Questions  ORDER TEST   X Complete Metabolic Panel   X Complete Blood Count   X Lactate Dehydrogenase   X INR   X Magnesium         [place],      Nader Cisneros MD  Heart Failure, Mechanical Circulatory Support and Transplant Cardiology   of Medicine,  Division of Cardiology, [facility]

## 2023-06-13 NOTE — TELEPHONE ENCOUNTER
Returned call to [facility] pharmacy  Medications clarified as requested

## 2023-06-13 NOTE — TELEPHONE ENCOUNTER
[facility] Call Center    Phone Message    May a detailed message be left on voicemail: yes     Reason for Call: Other: [facility] pharmacy is calling to discuss a discontinue notice they received. Please call back asap as they are making his pill box. Thank you     Action Taken: Message routed to:  Other: Cardiology    Travel Screening: Not Applicable       Thank you!  [facility]

## 2023-06-14 NOTE — NURSING NOTE
Chief Complaint   Patient presents with     Follow Up      Medication review and vitals done by Sunil PETERSON.

## 2023-06-14 NOTE — LETTER
6/14/2023      RE: Eliseo Tanner  [address] [facility] [address]       Dear Colleague,    Thank you for the opportunity to participate in the care of your patient, Eliseo Tanner, at the [facility] [place] at [facility]. Please see a copy of my visit note below.    June 13, 2023     Eliseo Tanner is a 67 year old male with chronic systolic heart failure secondary to NICM now s/p HM 3 on 2/18, moderate CAD, HTN, ABHINAV on CPAP, DM2, CKD Stage III, ANA. His HM3 post-op course was complicated by retrosternal hematoma and bleeding in the lungs, RV failure,VT in ICU now on amiodarone and Afib w/AVR S/p DCCV on 2/28. He had pre-op proteus and enterococcus bacteremia and he has had MSSA bacteremia as well, s/p abx; later on had LDH elevation 2/2 to legionella c/b renal failure requiring temporary dialysis (off iHD since 3/10/2021). He did have 2 admissions in the very recent past. This includes admission after I saw him in clinic in 12/2022 for driveline infection and is s/p I&D at the time and also had acute blood loss anemia in the setting of abdominal wound bleeding. After discontinue he did relatively well but had a repeat admission in 2/2023 admission for acute on chronic HF exacerbation, recurrent driveline infection and hypertension. Ultimately his LVAD speed was increased to 6000 but after repeat TTE it was decreased back to 5800 RPM. Weight was 177LBS at the time of his discharge. He was continued on daptomycin, hydralazine 100mg TID as well as imdur 120mg daily.   He presents today for LVAD follow-up today.  Overall he has been doing relatively well since hospital discharge in fact he feels that the torsemide has been working very well for him.  He is not taking the HCTZ at this time.  He does not have any lower extremity edema and continues with a daily daptomycin which was given in the hospital setting.  Every day this is given around  10:00.  Here as well as in [place] with the plan is to continue.  He is following up with ID on the 10th and then the further discussions can be made how to proceed.  No other complaints takes no medications, no bleeding strokelike symptoms.  His blood pressure is little elevated today however he has not taken his medication yet given that he was fasting.   He had been admitted repeatedly for the post couple of months, including most recently to [facility] on 5/19/23. This was in the setting of repeat falls at home and dropping hgb. This is after a prolonged recent hospitalization for acute on chronic HF exacerbation requiring aggressive diuresis. He was not euvolemic completely at the time of his initial discharge but he really wanted to get tessa as soon as possible at the time. On most recent admission hgb again dropped to 6.9, colonoscopy and EGD were done. colono revealed small bleeding area in duodenum, might have contributed to the hgb but uncertain. Per GI no further procedures were indicated at the time. He was transfused and warfarin had been restarted and ASA had been stopped. Hgb 9 at the time of discharge. NOte that he also had a brief episode of altered mental status that had resolved. Also he was recently diagnosed with liver cirrhosis on liver bx as we was briefly considered for tx at the time yet with advanced cirrhosis unfortunately he would not be a candidate.  He had severe difficulties with volume manage,ent. We essentially have been managing his diuretics on a daily basis based on cardiomems numbers as well as weight and renal function. It had been a significant challenge. Today he is here for follow up.  Overall he has not been feeling well.  He continues to struggle with volume management and also relatively weak and fatigued.  This seems to be some discrepancy between his wife and himself how he feels and how things are going at home in regards to activities however told him that they agreed  that he remains a week and these have multiple falls including one while he was walking in today to the clinic.  He thinks he just stepped on something was a mechanical fall however there was concern that he is just weak and does not have good balance.  Continues to have the PICC line in his arm and would intermittently given IV diuretics in addition to the p.o. doses.  Takes her medications as prescribed.  Continues to take CardioMEMS numbers on a daily basis for that we are reviewing every day.  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)     MSSA    Legionella pneumonia (H)     LVAD (left ventricular assist device) present (H)     MSSA bacteremia 2020     FAMILY HISTORY:  No relevant family history     SOCIAL HISTORY:  Social History     Socioeconomic History    Marital status:    Tobacco Use    Smoking status: Former     Types: Cigarettes     Quit date: 1994     Years since quittin.2    Smokeless tobacco: Never   Vaping Use    Vaping status: Never Used   Substance and Sexual Activity    Alcohol use: Not Currently    Drug use: Never     CURRENT MEDICATIONS:  Current Outpatient Medications   Medication    allopurinol (ZYLOPRIM) 100 MG tablet    amiodarone (PACERONE) 200 MG tablet    bumetanide (BUMEX) 0.25 MG/ML injection    ceFEPIme (MAXIPIME) 2 g vial    co-enzyme Q-10 200 MG CAPS    empagliflozin (JARDIANCE) 10 MG TABS tablet    ferrous sulfate (FEROSUL) 325 (65 Fe) MG tablet    finasteride (PROSCAR) 5 MG tablet    hydrochlorothiazide (HYDRODIURIL) 25 MG tablet    levothyroxine (SYNTHROID/LEVOTHROID) 100 MCG tablet    magnesium oxide (MAG-OX) 400 MG tablet    pantoprazole (PROTONIX) 40 MG EC tablet    potassium chloride ER (KLOR-CON M) 20 MEQ  CR tablet    QUEtiapine (SEROQUEL) 25 MG tablet    senna-docusate (SENOKOT-S/PERICOLACE) 8.6-50 MG tablet    tamsulosin (FLOMAX) 0.4 MG capsule    torsemide (DEMADEX) 100 MG tablet    warfarin ANTICOAGULANT (COUMADIN) 3 MG tablet     No current facility-administered medications for this visit.     ROS:   Constitutional: No fever, chills, or sweats. Weight is 0 lbs 0 oz  ENT: No visual disturbance, ear ache, epistaxis, sore throat.   Allergies/Immunologic: Negative.   Respiratory: No cough, hemoptysis.   Cardiovascular: As per HPI.   GI: No nausea, vomiting, hematemesis, melena, or hematochezia.   : No urinary frequency, dysuria, or hematuria.   Integument: Negative.   Psychiatric: Pleasant, no major depression noted  Neuro: No focal neurological deficits noted  Endocrinology: Negative.   Musculoskeletal: As per HPI.      EXAM:  MAP was 80 mmHg, heart rate was 72 bpm  GENERAL: Appears comfortable, in no acute distress.   HEENT: Eye symmetrical, no discharge or icterus bilaterally. Mucous membranes moist and without lesions.  CV: Hum of HM3, occasional S1S2. JVP ~10.   RESPIRATORY: Respirations regular, even, and unlabored. Lungs CTA throughout.   GI: Soft, non distended with normoactive bowel sounds. No tenderness, rebound, guarding.   EXTREMITIES: Mild to b/l peripheral edema. All extremities are warm and well perfused  NEUROLOGIC: Alert and interacting appropriately. No focal deficits.   MUSCULOSKELETAL: No joint swelling or tenderness.   SKIN: No jaundice. No rashes or lesions. Driveline dressing c/d/i.    LVAD was interrogated at bedside.  Speed remains at 5800 RPM, flow was 4. 5 L/min and PI was 3.1.    No significant alarms were noted over the past couple of days.  PI events noted     Labs:  Lab Results   Component Value Date    WBC 6.9 05/31/2023    HGB 9.0 (L) 05/31/2023    HCT 29.7 (L) 05/31/2023     05/31/2023     05/31/2023    POTASSIUM 3.9 05/31/2023    CHLORIDE 84 (L) 06/08/2023    CO2 28  05/31/2023    BUN 41.5 (H) 05/31/2023    CR 2.03 (H) 05/31/2023    GLC 94 05/31/2023    SED 49 (H) 12/08/2022    DD 2.71 (H) 10/18/2022    NTBNPI 2,231 (H) 05/20/2023    NTBNP 4,416 (H) 02/10/2022    TROPI 0.377 (HH) 02/15/2021    AST 69 (H) 05/29/2023    ALT 50 05/29/2023    GGT 1,075 (H) 03/23/2023    ALKPHOS 170 (H) 05/29/2023    BILITOTAL 0.6 05/29/2023    CHERELLE 38 05/25/2023    INR 2.3 (A) 06/06/2023     Echo 2/22/2021: LVEF 15-20%; RV appears at least moderate to severely reduced and grossly dilated; aortic valve opens intermittently   CHAMP 3/3/2021: LVEF 10-20%; moderate to severe RV dilation with severely reduced RV function; aortic valve opens partially with each beat, mild to moderate AI, mild to moderatd MR  Echo 2/10/2022: LVEF 10-15%, moderate RV dilated and moderately reduced function, aortic valve opens with each beat, trace AI, mild MR     [facility] 09/20/2022:  MAP 75  RA --/--/12  RV 42/11  PA 42/16/26  PCWP --/--/15  PA Sat 67%  Jose CO/CI 5.3/2.7   dynes  PVR 2.1 CAMARA    [facility] 2/13/2022:  RA: 15  RV: 63/15  PA: 62/22 (35)  PCWP: 20  TD CI/CO: 2.59/5.39  Joes CI/CO: 2.17/4.53  PVR: 2.8 CAMARA      [facility] 9/20/2022:  MAP 75  RA --/--/12  RV 42/11  PA 42/16/26  PCWP --/--/15  PA Sat 67%  Jose CO/CI 5.3/2.7   dynes  PVR 2.1 CAMARA  CardioMems placement      [facility] 12/12/22:  RA: --/--/10  RV 42/9  PA 40/13/22  PCWP --/--/13  CO/CI: 3.24/1.66 by TD; 4.4/2.25 by Jose  PVR 2.04 (JOSE) camara     TTE 2/7/23:  LVAD cannula was seen in the expected anatomic position in the LV apex. HM3 at 5800RPM. LVIDd 49mm. Abnormal septal motion due to paced thythm. Aortic valve open partially with each systole. Mild AI. Normal flow velocities.  Moderate tricuspid insufficiency is present.  Global right ventricular function is moderately to severely reduced.  IVC diameter >2.1 cm collapsing <50% with sniff suggests a high RA pressure estimated at 15 mmHg or greater.  No pericardial effusion is present.     TTE 2/13/23:  HM3 at  6000RPM. LVAD cannula was seen in the expected anatomic position in the LV apex. The LV is normal in size LVIDd 38 mm.  Aortic valve remains closed during the cardiac cycle, mild aortic regurgitation. Normal inflow and outflow velocities.  Mild tricuspid insufficiency is present.  Global right ventricular function is severely reduced.  No pericardial effusion is present.     [facility] 4/28/23  mRA-12, mPCWP-12, JOSE Co-7, JOSE CI-3.6-note with drop in LVAD speed no significant improvement in RA with increase in wedge    Assessment and Plan:   Eliseo Tanner is a 67 year old male with chronic systolic heart failure secondary to NICM now s/p HM 3 on 2/18, moderate CAD, HTN, ABHINAV on CPAP, DM2, CKD Stage III, ANA. His HM3 post-op course was complicated by retrosternal hematoma and bleeding in the lungs, RV failure,VT in ICU now on amiodarone and Afib w/AVR S/p DCCV on 2/28. He had pre-op proteus and enterococcus bacteremia and he has had MSSA bacteremia as well, s/p abx; later on had LDH elevation 2/2 to legionella c/b renal failure requiring temporary dialysis (off iHD since 3/10/2021).  He presents today for LVAD follow-up.  Overall denies any new complaints he appears euvolemic today.  Torsemide seems to be working well and we will continue this.  We will not make any medication changes.  Note again his blood pressure was elevated however has not taken any of his medication this morning.  I think his blood pressure is overall well controlled.  We will make an effort to see him regularly and he has a follow-up already scheduled with core clinic and I will see him in April in [place].  Multiple admissions as discussed above including for severe HF exacerbation as well as fatigue, falls, severe hgb drop in the setting of potential GI bleed. Multiple diuretic adjustmentss, essentially on a daily basis based on Mems numbers and creatinine.  At this time in my mind at least unfortunately continues to struggle and we  are not able to make significant progress.  We are doing significant daily adjustments on his diuretics including holding or adding back the torsemide, holding the Bumex at this time as well as adjusting his HCTZ.  Unfortunately despite all these interventions we are not making significant progress and in fact he is getting a little bit worse overall.  At this time we discussed potential options including transitioning somewhat to words end-of-life care and palliative options however they are not interested in this at this moment.  Today would like to continue with overall aggressive interventions.  This is understood.  Unfortunately our options are a somewhat limited at this time.  We discussed that I am worried about his recurrent falls and offered some potential assistance may be finding something for for him more assistance for him however they declined it at this time.  The wife notes him very well and able to take care of him very well however she needs to look after herself as well.  I reiterated this.  Again we are continuing with full interventions.  As such we decided to hold the torsemide the evening of the clinic visit and restarted at 150 mg 3 times daily dosing the next day.  In addition we will continue to hold the HCTZ as well as the Bumex at this time.  We will reevaluate tomorrow and adjust.  I am very worried about his creatinine which increased above 3 at this point in the BUN which is almost to 100.  He does not have confusion.  We discussed that if the the numbers do not look better than he would need to be admitted either here in [place] or down at [facility] for further adjustment potential right heart catheterization and management.  They are in agreement with this.  We will get labs on Friday.       Chronic SCHF secondary to NICM s/p HM III with RV dysfunction. Implanted 2/18 and complicated by bleeding.   Stage D, NYHA Class IIIA  ACEi/ARB Hydralazine 100 mg TID. Continue  amlodipine to 10 mg daily  BB Has been deferred given RHF, deferred d/t bradycardia now  Aldosterone antagonist deferred while other medical therapy is prioritized  SCD prophylaxis ICD  Fluid status Hypervolemic:    MAP: Goal 65-85, he is-80 today  Anticoagulation: Coumadin per pharmacy.  Goal 1.8-2.5 for recurrent nosebleeding, INR 1.39- defer bridge, dosing per ACC team  Antiplatelet: ASA 81 mg po daily  Cardiomems      CKD stage IV  Baseline has increased over the last year. Current B/l is around   - Diuretic management as above     MSSA Driveline infection, ongoing drainage  - Patient saw Dr. Bhatti today, management per her and the ID team  - On Keflex for now      A. Fib.  Sinus Bradycardia   History of Afib w/ RVR and aberrancy vs. NATHALIA vs. AT vs. Slow VT. S/p DCCV on 2/28 back into sinus rhythm. Has been tachycardic in the past but now in sinus bradycardia with increased RV pacing.  - Continue on amiodarone  - Increased lower pacer rate from 40 to 60 and turned on rate response at a prior appointment     HTN, within goal today  - Continue hydralazine and amlodipine     History of HIT  - Avoid heparin products      I appreciate the opportunity to participate in the care of Eliseo Tanner . Please do not hesitate to contact me with any further questions.     Sincerely,   Nader Cisneros MD  [facility] Division of Cardiology       Please do not hesitate to contact me if you have any questions/concerns.     Sincerely,     Nader Cisneros MD

## 2023-06-14 NOTE — PROGRESS NOTES
ANTICOAGULATION MANAGEMENT     Eliseo Tanner 67 year old male is on warfarin with subtherapeutic INR result. (Goal INR 1.7-2.3)    Recent labs: (last 7 days)     06/12/23  0954   INR 1.6*       ASSESSMENT       Source(s): Chart Review and Patient/Caregiver Call       Warfarin doses taken: Warfarin taken as instructed    Diet: No new diet changes identified    Medication/supplement changes: None noted    New illness, injury, or hospitalization: No    Signs or symptoms of bleeding or clotting: No    Previous result: Therapeutic last visit; previously outside of goal range    Additional findings: None         PLAN     Recommended plan for no diet, medication or health factor changes affecting INR     Dosing Instructions: booster dose then continue your current warfarin dose with next INR in 1 week       Summary  As of 6/14/2023    Full warfarin instructions:  6/14: 4 mg; Otherwise 2 mg every Wed; 4 mg all other days   Next INR check:  6/19/2023             Telephone call with  Veronique who verbalizes understanding and agrees to plan and who agrees to plan and repeated back plan correctly    Patient using outside facility for labs    Education provided:     Taking warfarin: Importance of taking warfarin as instructed    Goal range and lab monitoring: goal range and significance of current result and Importance of therapeutic range    Plan made per ACC anticoagulation protocol and per LVAD protocol    Gloria Abbasi RN  Anticoagulation Clinic  6/14/2023    _______________________________________________________________________     Anticoagulation Episode Summary     Current INR goal:  1.7-2.3   TTR:  50.3 % (9.3 mo)   Target end date:  Indefinite   Send INR reminders to:  ANTICOAG LVAD    Indications    LVAD (left ventricular assist device) present (H) [Z95.811]  Chronic systolic congestive heart failure (H) [I50.22]  Paroxysmal atrial fibrillation (H) [I48.0]           Comments:  Follow VAD Anticoag protocol:Yes:  HeartMate 3   Bridging: Call MD each time   Date VAD placed: 2/18/2020 5/6/22 [employer] [facility]         Anticoagulation Care Providers     Provider Role Specialty Phone number    Nader Cisneros MD Referring Cardiovascular Disease [phone]

## 2023-06-14 NOTE — PATIENT INSTRUCTIONS
You were seen today by [facility] Advanced Heart Failure Cardiologist, Dr. Cisneros, in partnership with [facility] in [place], [place]       Medication and Care Instructions:        Follow Up Appointments:         Questions:    For concerns or questions regarding your heart care please contact your [facility] Team at [phone]. If there are any questions specifically about this visit please call  [facility] at  [phone].    Follow the American Heart Association Diet and Lifestyle recommendations:    Limit saturated fat, trans fat, sodium, red meat, sweets and sugar-sweetened beverages.   If you choose to eat red meat, compare labels and select the leanest cuts available.  Aim for at least 150 minutes of moderate physical activity or 75 minutes of vigorous physical activity - or an equal combination of both - each week.

## 2023-06-14 NOTE — PROGRESS NOTES
June 13, 2023     Eliseo Tanner is a 67 year old male with chronic systolic heart failure secondary to NICM now s/p HM 3 on 2/18, moderate CAD, HTN, ABHINAV on CPAP, DM2, CKD Stage III, ANA. His HM3 post-op course was complicated by retrosternal hematoma and bleeding in the lungs, RV failure,VT in ICU now on amiodarone and Afib w/AVR S/p DCCV on 2/28. He had pre-op proteus and enterococcus bacteremia and he has had MSSA bacteremia as well, s/p abx; later on had LDH elevation 2/2 to legionella c/b renal failure requiring temporary dialysis (off iHD since 3/10/2021). He did have 2 admissions in the very recent past. This includes admission after I saw him in clinic in 12/2022 for driveline infection and is s/p I&D at the time and also had acute blood loss anemia in the setting of abdominal wound bleeding. After discontinue he did relatively well but had a repeat admission in 2/2023 admission for acute on chronic HF exacerbation, recurrent driveline infection and hypertension. Ultimately his LVAD speed was increased to 6000 but after repeat TTE it was decreased back to 5800 RPM. Weight was 177LBS at the time of his discharge. He was continued on daptomycin, hydralazine 100mg TID as well as imdur 120mg daily.   He presents today for LVAD follow-up today.  Overall he has been doing relatively well since hospital discharge in fact he feels that the torsemide has been working very well for him.  He is not taking the HCTZ at this time.  He does not have any lower extremity edema and continues with a daily daptomycin which was given in the hospital setting.  Every day this is given around 10:00.  Here as well as in [place] with the plan is to continue.  He is following up with ID on the 10th and then the further discussions can be made how to proceed.  No other complaints takes no medications, no bleeding strokelike symptoms.  His blood pressure is little elevated today however he has not taken his medication yet  given that he was fasting.   He had been admitted repeatedly for the post couple of months, including most recently to [facility] on 5/19/23. This was in the setting of repeat falls at home and dropping hgb. This is after a prolonged recent hospitalization for acute on chronic HF exacerbation requiring aggressive diuresis. He was not euvolemic completely at the time of his initial discharge but he really wanted to get tessa as soon as possible at the time. On most recent admission hgb again dropped to 6.9, colonoscopy and EGD were done. colono revealed small bleeding area in duodenum, might have contributed to the hgb but uncertain. Per GI no further procedures were indicated at the time. He was transfused and warfarin had been restarted and ASA had been stopped. Hgb 9 at the time of discharge. NOte that he also had a brief episode of altered mental status that had resolved. Also he was recently diagnosed with liver cirrhosis on liver bx as we was briefly considered for tx at the time yet with advanced cirrhosis unfortunately he would not be a candidate.  He had severe difficulties with volume manage,ent. We essentially have been managing his diuretics on a daily basis based on cardiomems numbers as well as weight and renal function. It had been a significant challenge. Today he is here for follow up.  Overall he has not been feeling well.  He continues to struggle with volume management and also relatively weak and fatigued.  This seems to be some discrepancy between his wife and himself how he feels and how things are going at home in regards to activities however told him that they agreed that he remains a week and these have multiple falls including one while he was walking in today to the clinic.  He thinks he just stepped on something was a mechanical fall however there was concern that he is just weak and does not have good balance.  Continues to have the PICC line in his arm and would intermittently given IV  diuretics in addition to the p.o. doses.  Takes her medications as prescribed.  Continues to take CardioMEMS numbers on a daily basis for that we are reviewing every day.  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)     MSSA     Legionella pneumonia (H)      LVAD (left ventricular assist device) present (H)      MSSA bacteremia 2020     FAMILY HISTORY:  No relevant family history     SOCIAL HISTORY:  Social History     Socioeconomic History     Marital status:    Tobacco Use     Smoking status: Former     Types: Cigarettes     Quit date: 1994     Years since quittin.2     Smokeless tobacco: Never   Vaping Use     Vaping status: Never Used   Substance and Sexual Activity     Alcohol use: Not Currently     Drug use: Never     CURRENT MEDICATIONS:  Current Outpatient Medications   Medication     allopurinol (ZYLOPRIM) 100 MG tablet     amiodarone (PACERONE) 200 MG tablet     bumetanide (BUMEX) 0.25 MG/ML injection     ceFEPIme (MAXIPIME) 2 g vial     co-enzyme Q-10 200 MG CAPS     empagliflozin (JARDIANCE) 10 MG TABS tablet     ferrous sulfate (FEROSUL) 325 (65 Fe) MG tablet     finasteride (PROSCAR) 5 MG tablet     hydrochlorothiazide (HYDRODIURIL) 25 MG tablet     levothyroxine (SYNTHROID/LEVOTHROID) 100 MCG tablet     magnesium oxide (MAG-OX) 400 MG tablet     pantoprazole (PROTONIX) 40 MG EC tablet     potassium chloride ER (KLOR-CON M) 20 MEQ CR tablet     QUEtiapine (SEROQUEL) 25 MG tablet     senna-docusate (SENOKOT-S/PERICOLACE) 8.6-50 MG tablet     tamsulosin (FLOMAX) 0.4 MG capsule     torsemide (DEMADEX) 100 MG tablet     warfarin ANTICOAGULANT (COUMADIN) 3 MG tablet     No current facility-administered medications for this visit.      ROS:   Constitutional: No fever, chills, or sweats. Weight is 0 lbs 0 oz  ENT: No visual disturbance, ear ache, epistaxis, sore throat.   Allergies/Immunologic: Negative.   Respiratory: No cough, hemoptysis.   Cardiovascular: As per HPI.   GI: No nausea, vomiting, hematemesis, melena, or hematochezia.   : No urinary frequency, dysuria, or hematuria.   Integument: Negative.   Psychiatric: Pleasant, no major depression noted  Neuro: No focal neurological deficits noted  Endocrinology: Negative.   Musculoskeletal: As per HPI.      EXAM:  MAP was 80 mmHg, heart rate was 72 bpm  GENERAL: Appears comfortable, in no acute distress.   HEENT: Eye symmetrical, no discharge or icterus bilaterally. Mucous membranes moist and without lesions.  CV: Hum of HM3, occasional S1S2. JVP ~10.   RESPIRATORY: Respirations regular, even, and unlabored. Lungs CTA throughout.   GI: Soft, non distended with normoactive bowel sounds. No tenderness, rebound, guarding.   EXTREMITIES: Mild to b/l peripheral edema. All extremities are warm and well perfused  NEUROLOGIC: Alert and interacting appropriately. No focal deficits.   MUSCULOSKELETAL: No joint swelling or tenderness.   SKIN: No jaundice. No rashes or lesions. Driveline dressing c/d/i.    LVAD was interrogated at bedside.  Speed remains at 5800 RPM, flow was 4. 5 L/min and PI was 3.1.    No significant alarms were noted over the past couple of days.  PI events noted     Labs:  Lab Results   Component Value Date    WBC 6.9 05/31/2023    HGB 9.0 (L) 05/31/2023    HCT 29.7 (L) 05/31/2023     05/31/2023     05/31/2023    POTASSIUM 3.9 05/31/2023    CHLORIDE 84 (L) 06/08/2023    CO2 28 05/31/2023    BUN 41.5 (H) 05/31/2023    CR 2.03 (H) 05/31/2023    GLC 94 05/31/2023    SED 49 (H) 12/08/2022    DD 2.71 (H) 10/18/2022    NTBNPI 2,231 (H) 05/20/2023    NTBNP 4,416 (H) 02/10/2022    TROPI 0.377 (HH) 02/15/2021    AST 69 (H) 05/29/2023    ALT 50 05/29/2023    GGT 1,075 (H)  03/23/2023    ALKPHOS 170 (H) 05/29/2023    BILITOTAL 0.6 05/29/2023    CHERELLE 38 05/25/2023    INR 2.3 (A) 06/06/2023     Echo 2/22/2021: LVEF 15-20%; RV appears at least moderate to severely reduced and grossly dilated; aortic valve opens intermittently   CHAMP 3/3/2021: LVEF 10-20%; moderate to severe RV dilation with severely reduced RV function; aortic valve opens partially with each beat, mild to moderate AI, mild to moderatd MR  Echo 2/10/2022: LVEF 10-15%, moderate RV dilated and moderately reduced function, aortic valve opens with each beat, trace AI, mild MR     [facility] 09/20/2022:  MAP 75  RA --/--/12  RV 42/11  PA 42/16/26  PCWP --/--/15  PA Sat 67%  Jose CO/CI 5.3/2.7   dynes  PVR 2.1 CAMARA    [facility] 2/13/2022:  RA: 15  RV: 63/15  PA: 62/22 (35)  PCWP: 20  TD CI/CO: 2.59/5.39  Jose CI/CO: 2.17/4.53  PVR: 2.8 CAMARA      [facility] 9/20/2022:  MAP 75  RA --/--/12  RV 42/11  PA 42/16/26  PCWP --/--/15  PA Sat 67%  Jose CO/CI 5.3/2.7   dynes  PVR 2.1 CAMARA  CardioMems placement      [facility] 12/12/22:  RA: --/--/10  RV 42/9  PA 40/13/22  PCWP --/--/13  CO/CI: 3.24/1.66 by TD; 4.4/2.25 by Jose  PVR 2.04 (JOSE) camara     TTE 2/7/23:  LVAD cannula was seen in the expected anatomic position in the LV apex. HM3 at 5800RPM. LVIDd 49mm. Abnormal septal motion due to paced thythm. Aortic valve open partially with each systole. Mild AI. Normal flow velocities.  Moderate tricuspid insufficiency is present.  Global right ventricular function is moderately to severely reduced.  IVC diameter >2.1 cm collapsing <50% with sniff suggests a high RA pressure estimated at 15 mmHg or greater.  No pericardial effusion is present.     TTE 2/13/23:  HM3 at 6000RPM. LVAD cannula was seen in the expected anatomic position in the LV apex. The LV is normal in size LVIDd 38 mm.  Aortic valve remains closed during the cardiac cycle, mild aortic regurgitation. Normal inflow and outflow velocities.  Mild tricuspid insufficiency is present.  Global right  ventricular function is severely reduced.  No pericardial effusion is present.     RHC 4/28/23  mRA-12, mPCWP-12, JOSE Co-7, JOSE CI-3.6-note with drop in LVAD speed no significant improvement in RA with increase in wedge    Assessment and Plan:   Eliseo Tanner is a 67 year old male with chronic systolic heart failure secondary to NICM now s/p HM 3 on 2/18, moderate CAD, HTN, ABHINAV on CPAP, DM2, CKD Stage III, ANA. His HM3 post-op course was complicated by retrosternal hematoma and bleeding in the lungs, RV failure,VT in ICU now on amiodarone and Afib w/AVR S/p DCCV on 2/28. He had pre-op proteus and enterococcus bacteremia and he has had MSSA bacteremia as well, s/p abx; later on had LDH elevation 2/2 to legionella c/b renal failure requiring temporary dialysis (off iHD since 3/10/2021).  He presents today for LVAD follow-up.  Overall denies any new complaints he appears euvolemic today.  Torsemide seems to be working well and we will continue this.  We will not make any medication changes.  Note again his blood pressure was elevated however has not taken any of his medication this morning.  I think his blood pressure is overall well controlled.  We will make an effort to see him regularly and he has a follow-up already scheduled with core clinic and I will see him in April in [place].  Multiple admissions as discussed above including for severe HF exacerbation as well as fatigue, falls, severe hgb drop in the setting of potential GI bleed. Multiple diuretic adjustmentss, essentially on a daily basis based on Mems numbers and creatinine.  At this time in my mind at least unfortunately continues to struggle and we are not able to make significant progress.  We are doing significant daily adjustments on his diuretics including holding or adding back the torsemide, holding the Bumex at this time as well as adjusting his HCTZ.  Unfortunately despite all these interventions we are not making significant  progress and in fact he is getting a little bit worse overall.  At this time we discussed potential options including transitioning somewhat to words end-of-life care and palliative options however they are not interested in this at this moment.  Today would like to continue with overall aggressive interventions.  This is understood.  Unfortunately our options are a somewhat limited at this time.  We discussed that I am worried about his recurrent falls and offered some potential assistance may be finding something for for him more assistance for him however they declined it at this time.  The wife notes him very well and able to take care of him very well however she needs to look after herself as well.  I reiterated this.  Again we are continuing with full interventions.  As such we decided to hold the torsemide the evening of the clinic visit and restarted at 150 mg 3 times daily dosing the next day.  In addition we will continue to hold the HCTZ as well as the Bumex at this time.  We will reevaluate tomorrow and adjust.  I am very worried about his creatinine which increased above 3 at this point in the BUN which is almost to 100.  He does not have confusion.  We discussed that if the the numbers do not look better than he would need to be admitted either here in [place] or down at [facility] for further adjustment potential right heart catheterization and management.  They are in agreement with this.  We will get labs on Friday.       Chronic SCHF secondary to NICM s/p HM III with RV dysfunction. Implanted 2/18 and complicated by bleeding.   Stage D, NYHA Class IIIA  ACEi/ARB Hydralazine 100 mg TID. Continue amlodipine to 10 mg daily  BB Has been deferred given RHF, deferred d/t bradycardia now  Aldosterone antagonist deferred while other medical therapy is prioritized  SCD prophylaxis ICD  Fluid status Hypervolemic:    MAP: Goal 65-85, he is-80 today  Anticoagulation: Coumadin per pharmacy.   Goal 1.8-2.5 for recurrent nosebleeding, INR 1.39- defer bridge, dosing per ACC team  Antiplatelet: ASA 81 mg po daily  Cardiomems      CKD stage IV  Baseline has increased over the last year. Current B/l is around   - Diuretic management as above     MSSA Driveline infection, ongoing drainage  - Patient saw Dr. Bhatti today, management per her and the ID team  - On Keflex for now      A. Fib.  Sinus Bradycardia   History of Afib w/ RVR and aberrancy vs. longterm vs. AT vs. Slow VT. S/p DCCV on 2/28 back into sinus rhythm. Has been tachycardic in the past but now in sinus bradycardia with increased RV pacing.  - Continue on amiodarone  - Increased lower pacer rate from 40 to 60 and turned on rate response at a prior appointment     HTN, within goal today  - Continue hydralazine and amlodipine     History of HIT  - Avoid heparin products      I appreciate the opportunity to participate in the care of Eliseo Tanner . Please do not hesitate to contact me with any further questions.     Sincerely,   Nader Cisneros MD  [facility] Division of Cardiology

## 2023-06-16 NOTE — PROGRESS NOTES
Addended by: ADALBERTO ZARATE on: 10/13/2020 04:25 PM     Modules accepted: Orders     D:  Rcvd follow up CMP, CBC and LDH.  Called pt to discuss symptoms.  Pt denies all signs of bleeding including: dark/tarry, bloody stools, nose bleeds and any new bruising.  Pt reporting loose stools with incontinence. Pt feeling fatigued and weak.  Falls becoming more frequent, most recent Wednesday at [facility] appt.  Current LVAD numbers, Speed: 5800, Flow: 4.9, PI: 3.0, Power: 4.8, & weight 181.  These are all pretty close to baseline. Pt denies shortness of breath, abdominal fullness and swelling.  I:  Discussed symptoms and labs with Dr. Cisneros.  Plan: repeat CMP and CBC on Monday. Standing orders sent to [facility].  Potassium increased, see previous note.  Pt to call if symptoms worsen, stools change colors or VAD parameters change.  A:  Follow up   P:  Pt/Veronique verbalized understanding of the instructions given.  Will call VAD coordinator with further needs and questions.

## 2023-06-16 NOTE — LETTER
6/16/2023        RE: Eliseo Tanner  [address] [place] [address]        [facility] - CardioMEMS Reading Review    June 16, 2023    Cardiomems period: 5/18/2023 - 6/16/23    CardioMEMS reviewed and routed to patient's provider, Dr. Cisneros/Francoise NP.     Current diuretic dose: Torsemide 100 TID with Hydrochlorothiazide 75mg daily, (ON HOLD: Bumex IV 5mg Monday & Friday's)  Current potassium chloride dose:  Potassium 80mEq, 80mEq & 60mEq daily w/ additional 40mEq w/ Bumex doses     Symptoms: Pt fatigued & weak, is having loose stools, and has had recent falls  Weights: 181 today, 170-183 over the course of the last month.    Recent plan of care changes: Last month patient was hospitalized and has had many diuretic changes throughout the month.  IV Bumex has been on hold since discharge.    Plan of care changes today: Hold PM dose of torsemide, hold Hydrochlorothiazide, Bumex until further notice.  Increase Potassium to 80mEq TID with an additional 80mEq today.  Repeating labs Monday.    Current Threshold parameters: 21 - 24    Today's Waveform:       Reading Ranges:           RHC Date Wedge Pressure MEMS PAD during cath  (average of the 3 values)     Sept 20, 2022 w/MEMS implant     15 mmHg   16 mmHg         Attestation signed by Francoise Peters, JUAN CNP at 6/19/2023  2:19 PM:  Remote monitoring of Pulmonary Artery Pressures via CardioMEMS device reviewed 5/18/23-6/16/23 with VAD coordinators. Diuretics adjusted accordingly.         Sincerely,        Cardiovascular Nurse

## 2023-06-16 NOTE — LETTER
6/16/2023        RE: Eliseo Tanner  [address] [place] [address]        [facility]ORE [facility] - CardioMEMS Reading Review    June 16, 2023    Cardiomems period: 5/18/2023 - 6/16/23    CardioMEMS reviewed and routed to patient's provider, Dr. Cisneros/Francoise NP.     Current diuretic dose: Torsemide 100 TID with Hydrochlorothiazide 75mg daily, (ON HOLD: Bumex IV 5mg Monday & Friday's)  Current potassium chloride dose:  Potassium 80mEq, 80mEq & 60mEq daily w/ additional 40mEq w/ Bumex doses     Symptoms: Pt fatigued & weak, is having loose stools, and has had recent falls  Weights: 181 today, 170-183 over the course of the last month.    Recent plan of care changes: Last month patient was hospitalized and has had many diuretic changes throughout the month.  IV Bumex has been on hold since discharge.    Plan of care changes today: Hold PM dose of torsemide, hold Hydrochlorothiazide, Bumex until further notice.  Increase Potassium to 80mEq TID with an additional 80mEq today.  Repeating labs Monday.    Current Threshold parameters: 21 - 24    Today's Waveform:       Reading Ranges:           RHC Date Wedge Pressure MEMS PAD during cath  (average of the 3 values)     Sept 20, 2022 w/MEMS implant     15 mmHg   16 mmHg         Attestation signed by Francoise Peters, JUAN CNP at 6/19/2023  2:19 PM:  Remote monitoring of Pulmonary Artery Pressures via CardioMEMS device reviewed 5/18/23-6/16/23 with VAD coordinators. Diuretics adjusted accordingly.         Sincerely,        Cardiovascular Nurse

## 2023-06-16 NOTE — LETTER
6/16/2023        RE: Eliseo Tanner  [address] [place] [address]        [facility] - CardioMEMS Reading Review    June 16, 2023    Cardiomems period: 5/18/2023 - 6/16/23    CardioMEMS reviewed and routed to patient's provider, Dr. Cisneros/Francoise NP.     Current diuretic dose: Torsemide 100 TID with Hydrochlorothiazide 75mg daily, (ON HOLD: Bumex IV 5mg Monday & Friday's)  Current potassium chloride dose:  Potassium 80mEq, 80mEq & 60mEq daily w/ additional 40mEq w/ Bumex doses     Symptoms: Pt fatigued & weak, is having loose stools, and has had recent falls  Weights: 181 today, 170-183 over the course of the last month.    Recent plan of care changes: Last month patient was hospitalized and has had many diuretic changes throughout the month.  IV Bumex has been on hold since discharge.    Plan of care changes today: Hold PM dose of torsemide, hold Hydrochlorothiazide, Bumex until further notice.  Increase Potassium to 80mEq TID with an additional 80mEq today.  Repeating labs Monday.    Current Threshold parameters: 21 - 24    Today's Waveform:       Reading Ranges:           RHC Date Wedge Pressure MEMS PAD during cath  (average of the 3 values)     Sept 20, 2022 w/MEMS implant     15 mmHg   16 mmHg         Attestation signed by Francoise Peters, JUAN CNP at 6/19/2023  2:19 PM:  Remote monitoring of Pulmonary Artery Pressures via CardioMEMS device reviewed 5/18/23-6/16/23 with VAD coordinators. Diuretics adjusted accordingly.         Sincerely,        Cardiovascular Nurse

## 2023-06-16 NOTE — LETTER
6/16/2023        RE: Eliseo Tanner  [address] [place] [address]        [facility] - CardioMEMS Reading Review    June 16, 2023    Cardiomems period: 5/18/2023 - 6/16/23    CardioMEMS reviewed and routed to patient's provider, Dr. Cisneros/Francoise NP.     Current diuretic dose: Torsemide 100 TID with Hydrochlorothiazide 75mg daily, (ON HOLD: Bumex IV 5mg Monday & Friday's)  Current potassium chloride dose:  Potassium 80mEq, 80mEq & 60mEq daily w/ additional 40mEq w/ Bumex doses     Symptoms: Pt fatigued & weak, is having loose stools, and has had recent falls  Weights: 181 today, 170-183 over the course of the last month.    Recent plan of care changes: Last month patient was hospitalized and has had many diuretic changes throughout the month.  IV Bumex has been on hold since discharge.    Plan of care changes today: Hold PM dose of torsemide, hold Hydrochlorothiazide, Bumex until further notice.  Increase Potassium to 80mEq TID with an additional 80mEq today.  Repeating labs Monday.    Current Threshold parameters: 21 - 24    Today's Waveform:       Reading Ranges:           RHC Date Wedge Pressure MEMS PAD during cath  (average of the 3 values)     Sept 20, 2022 w/MEMS implant     15 mmHg   16 mmHg         Attestation signed by Francoise Peters, JUAN CNP at 6/19/2023  2:19 PM:  Remote monitoring of Pulmonary Artery Pressures via CardioMEMS device reviewed 5/18/23-6/16/23 with VAD coordinators. Diuretics adjusted accordingly.         Sincerely,        Cardiovascular Nurse

## 2023-06-16 NOTE — LETTER
STANDING CMP & CBC      Patient Name: Eliseo Tanner   BREANNA 1955   Diagnosis/ICD-9: Heart Failure, unspecified I50.9; LVAD Z95.811   Requesting Physician: Dr. Nader Cisneros   Date of Request: 23     Date ORDERS   23 Standing order for CMP and CBC w/Plt draws.   Quantity: 100  Expires in 1 year  Please fax results to [phone]   For questions please call [phone]  For critical results, please page [phone] opt 4 and as for the oncall VAD coordinator        Signed,      Nader Cisneros MD  Heart Failure, Mechanical Circulatory Support and Transplant Cardiology   of Medicine,  Division of Cardiology, [facility]

## 2023-06-16 NOTE — LETTER
6/16/2023        RE: Eliseo Tanner  [address] [place] [address]        [facility] - CardioMEMS Reading Review    June 16, 2023    Cardiomems period: 5/18/2023 - 6/16/23    CardioMEMS reviewed and routed to patient's provider, Dr. Cisneros/Francoise NP.     Current diuretic dose: Torsemide 100 TID with Hydrochlorothiazide 75mg daily, (ON HOLD: Bumex IV 5mg Monday & Friday's)  Current potassium chloride dose:  Potassium 80mEq, 80mEq & 60mEq daily w/ additional 40mEq w/ Bumex doses     Symptoms: Pt fatigued & weak, is having loose stools, and has had recent falls  Weights: 181 today, 170-183 over the course of the last month.    Recent plan of care changes: Last month patient was hospitalized and has had many diuretic changes throughout the month.  IV Bumex has been on hold since discharge.    Plan of care changes today: Hold PM dose of torsemide, hold Hydrochlorothiazide, Bumex until further notice.  Increase Potassium to 80mEq TID with an additional 80mEq today.  Repeating labs Monday.    Current Threshold parameters: 21 - 24    Today's Waveform:       Reading Ranges:           RHC Date Wedge Pressure MEMS PAD during cath  (average of the 3 values)     Sept 20, 2022 w/MEMS implant     15 mmHg   16 mmHg         Attestation signed by Francoise Peters, JUAN CNP at 6/19/2023  2:19 PM:  Remote monitoring of Pulmonary Artery Pressures via CardioMEMS device reviewed 5/18/23-6/16/23 with VAD coordinators. Diuretics adjusted accordingly.         Sincerely,        Cardiovascular Nurse

## 2023-06-16 NOTE — TELEPHONE ENCOUNTER
empagliflozin (JARDIANCE) 10 MG   Last Written Prescription Date:   5/4/23  Last Fill Quantity: 30,   # refills: 0  Last Office Visit : 3/23/23  Future Office visit:  7/27/23  Routing refill request to provider for review/approval because:   Drug not on the refill protocol     levothyroxine 100 MCG     Last Written Prescription Date:  5/3/23  Last Fill Quantity: 30,   # refills: 0  Routing refill request to provider for review/approval because:    Drug not on the refill protocol

## 2023-06-16 NOTE — LETTER
6/16/2023        RE: Eliseo Tanner  [address] [place] [address]        [facility] - CardioMEMS Reading Review    June 16, 2023    Cardiomems period: 5/18/2023 - 6/16/23    CardioMEMS reviewed and routed to patient's provider, Dr. Cisneros/Francoise NP.     Current diuretic dose: Torsemide 100 TID with Hydrochlorothiazide 75mg daily, (ON HOLD: Bumex IV 5mg Monday & Friday's)  Current potassium chloride dose:  Potassium 80mEq, 80mEq & 60mEq daily w/ additional 40mEq w/ Bumex doses     Symptoms: Pt fatigued & weak, is having loose stools, and has had recent falls  Weights: 181 today, 170-183 over the course of the last month.    Recent plan of care changes: Last month patient was hospitalized and has had many diuretic changes throughout the month.  IV Bumex has been on hold since discharge.    Plan of care changes today: Hold PM dose of torsemide, hold Hydrochlorothiazide, Bumex until further notice.  Increase Potassium to 80mEq TID with an additional 80mEq today.  Repeating labs Monday.    Current Threshold parameters: 21 - 24    Today's Waveform:       Reading Ranges:           RHC Date Wedge Pressure MEMS PAD during cath  (average of the 3 values)     Sept 20, 2022 w/MEMS implant     15 mmHg   16 mmHg         Attestation signed by Francoise Peters, JUAN CNP at 6/19/2023  2:19 PM:  Remote monitoring of Pulmonary Artery Pressures via CardioMEMS device reviewed 5/18/23-6/16/23 with VAD coordinators. Diuretics adjusted accordingly.         Sincerely,        Cardiovascular Nurse

## 2023-06-16 NOTE — LETTER
6/16/2023        RE: Eliseo Tanner  [address] [place] [address]        [facility] - CardioMEMS Reading Review    June 16, 2023    Cardiomems period: 5/18/2023 - 6/16/23    CardioMEMS reviewed and routed to patient's provider, Dr. Cisneros/Francoise NP.     Current diuretic dose: Torsemide 100 TID with Hydrochlorothiazide 75mg daily, (ON HOLD: Bumex IV 5mg Monday & Friday's)  Current potassium chloride dose:  Potassium 80mEq, 80mEq & 60mEq daily w/ additional 40mEq w/ Bumex doses     Symptoms: Pt fatigued & weak, is having loose stools, and has had recent falls  Weights: 181 today, 170-183 over the course of the last month.    Recent plan of care changes: Last month patient was hospitalized and has had many diuretic changes throughout the month.  IV Bumex has been on hold since discharge.    Plan of care changes today: Hold PM dose of torsemide, hold Hydrochlorothiazide, Bumex until further notice.  Increase Potassium to 80mEq TID with an additional 80mEq today.  Repeating labs Monday.    Current Threshold parameters: 21 - 24    Today's Waveform:       Reading Ranges:           RHC Date Wedge Pressure MEMS PAD during cath  (average of the 3 values)     Sept 20, 2022 w/MEMS implant     15 mmHg   16 mmHg         Attestation signed by Francoise Peters, JUAN CNP at 6/19/2023  2:19 PM:  Remote monitoring of Pulmonary Artery Pressures via CardioMEMS device reviewed 5/18/23-6/16/23 with VAD coordinators. Diuretics adjusted accordingly.         Sincerely,        Cardiovascular Nurse

## 2023-06-18 NOTE — PROGRESS NOTES
Bon called back and writer instructed patient to take 150 torsemide this evening and plan to come in to the hospital tomorrow.     Patient frustrated as he was hoping he could get IV bumex outpatient. As previously stated, Dr. Salmeron does not think outpatient bumex would be sufficient.     Agreed that patient will take the 150 mg Torsemide tonight, weigh himself tomorrow, and call his VAD coordinator in the morning to reassess. Patient agreeable to come in if his weight is still up tomorrow morning.

## 2023-06-18 NOTE — PROGRESS NOTES
Dr. Salmeron called regarding weight gain. Due to patient's recent labs on 6/12, she does not want to order hydrochlorothiazide. Believes patient will need IV diuretics with an inotrope inpatient.    Ordered Torsemide 150 mg tonight and come to hospital tomorrow for diuresis. Left voicemail on both Eliseo and wife's phone providing them instructions and requesting they page back to confirm they received the message.     Called patient placement to reserve bed on 6C. If bed not available by noon, patient to come to ED.

## 2023-06-18 NOTE — PROGRESS NOTES
Situation:   Writer spoke with Chapis today to discuss 9 lb weight gain.     Background:  Weight today: 190 lb. Compared to recent values this weight is increased. Per note on 6/16, patient was to hold daily hydrochlorothyiazide and  IV bumex until further notice due to baseline weight.     Assessment:  Symptoms:   Heart failure symptoms: increased fatigue, edema and some complaints of shortness of breath.   Applicable medication changes:   Patient currently holding IV Bumex (receives this at local clinic), Hydrochlorothiazide 75 mg daily     Currently taking: Torsemide 100mg TID, Potassium 80 mEq TID. Labs tomorrow.      Recommendation:  Will discuss with Dr. Salmeron.  Caregiver notified to page on-call coordinator if symptoms worsen or with other concerns. Caregiver verbalized understanding.

## 2023-06-19 NOTE — PROGRESS NOTES
[facility] - CardioMEMS Reading Review    June 19, 2023, 0750   CardioMems period: 6/17/2023 -       CardioMEMS reviewed and routed to patient's provider, Dr. Cisneros    Current diuretic:  Torsemide 100 TID, (Hydrochlorothiazide 75mg daily: currently on hold), (Bumex 5mg IV Monday Wednesday Friday: currently on hold)  Current potassium chloride dose:  Potassium 80mEq, 80mEq and 80mEq daily w/ extra 40mEq on Mondays Wednesdays Fridays (with Bumex doses)    Symptoms: Fatigue and tiredness. Some mild swelling in abdomen and legs.  Weights: Today, going back: 189, 190, 188, 181/Friday    Changes to plan of care today: Informed pt that MD recommending pt be admitted asap.  Pt unable to report to hospital today, but willing to come in tomorrow for admission.  Per Dr. Cisneros, pt to take one dose Hydrochlorothiazide 75mg today.    Current Threshold parameters: 21 - 24    Today's Waveform:       Readings:         [facility] Date Wedge Pressure MEMS PAD during cath  (average of the 3 values)     Sept 20, 2022 w/MEMS implant     15 mmHg   16 mmHg

## 2023-06-20 PROBLEM — R06.02 SHORTNESS OF BREATH: Status: ACTIVE | Noted: 2023-01-01

## 2023-06-20 PROBLEM — N17.9 AKI (ACUTE KIDNEY INJURY) (H): Status: ACTIVE | Noted: 2023-01-01

## 2023-06-20 NOTE — ED NOTES
Report give to KRISTEN Ambrosio 6 C. Updated ED Doc regarding troponn result of 163. Order placed for Bumex, IV and infusion. Writer unable to administered, medication comes from Pharmacy. Writer  Was unsuccessful when updating assigned  6 C RN. Patient being trasnported by jeff and ICU KRISTEN Loyola

## 2023-06-20 NOTE — ED TRIAGE NOTES
Pt arrives ambulatory to triage w/ c/o sob, BLE edema, stomach swelling. Pt endorses recent weight gain as well. SOB stated as exertional. Hx of hf, endorses taking diuretics as prescribed. Denies cp.     LVAD hm3

## 2023-06-20 NOTE — ED PROVIDER NOTES
ED PROVIDER NOTE  June 20, 2023  History     Chief Complaint   Patient presents with     Shortness of Breath     HPI  Eliseo Tanner is a 67 year old male who has a history significant for chronic systolic heart failure secondary to nonischemic cardiomyopathy currently on HeartMate 3 LVAD.  He arrives today to the emergency department at the direction of his cardiologist due to concern of fluid overload.  Patient has been having increasing weight gain, shortness of breath and fluid overload over the past 1 week.  He has been taking torsemide at 100 mg 3 times daily but reports progression of symptoms.  He has become short of breath at rest and with minimal exertion.  He denies chest pain or significant peripheral edema but does report development of edema into his anterior abdominal wall.  Patient does report he is taking antibiotics for concern about driveline infection.  He denies shaking chills, high fevers or progression of erythema over the anterior abdominal wall.  Patient reports he has been in discussion with his cardiology staff who strongly recommended admission to him.      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)     MSSA     Legionella pneumonia (H)      LVAD (left ventricular assist device) present (H)      MSSA bacteremia 11/2020     Past Surgical History:   Procedure Laterality Date     ANESTHESIA CARDIOVERSION N/A 02/28/2020    Procedure: ANESTHESIA, FOR CARDIOVERSION;  Surgeon: GENERIC ANESTHESIA PROVIDER;  Location: [facility] OR     COLONOSCOPY N/A 5/22/2023    Procedure: COLONOSCOPY, WITH POLYPECTOMY AND BIOPSY;  Surgeon: Myles Mota DO;  Location: [facility] GI     CV CARDIOMEMS WITH RIGHT HEART CATH N/A 09/20/2022    Procedure: Pulmonary Arterial Pressure Sensor Placement CPT Codes  to be cleared by financial securing for this implant. [id number] and ;  Surgeon: Dalton Baeza MD;  Location:  HEART CARDIAC CATH LAB     CV CENTRAL VENOUS CATHETER PLACEMENT N/A 02/13/2020    Procedure: Central Venous Catheter Placement;  Surgeon: Chente Moss MD;  Location:  HEART CARDIAC CATH LAB     CV INTRA AORTIC BALLOON N/A 02/07/2020    Procedure: Intra-Aortic Balloon Pump Insertion;  Surgeon: Jose Baldwin MD;  [facility]     CV INTRA AORTIC BALLOON N/A 02/13/2020    Procedure: Intra-Aortic Balloon Pump Insertion;  Surgeon: Chente Moss MD;  Location:  HEART CARDIAC CATH LAB     CV RIGHT HEART CATH MEASUREMENTS RECORDED N/A 09/21/2020    Procedure: CV RIGHT HEART CATH;  Surgeon: Dalton Baeza MD;  [facility]     CV RIGHT HEART CATH MEASUREMENTS RECORDED N/A 01/07/2021    Procedure: Right Heart Cath;  Surgeon: Chun Ball MD;  Location:  HEART CARDIAC CATH LAB     CV RIGHT HEART CATH MEASUREMENTS RECORDED N/A 02/10/2022    Procedure: CV RIGHT HEART CATH;  Surgeon: Dalton Baeza MD;  Location:  HEART CARDIAC CATH LAB     CV RIGHT HEART CATH MEASUREMENTS RECORDED N/A 09/20/2022    Procedure: Right Heart Catheterization [[id number]];  Surgeon: Dalton Baeza MD;  Location:  HEART CARDIAC CATH LAB     CV RIGHT HEART CATH MEASUREMENTS RECORDED N/A 12/12/2022    Procedure: Right Heart Cath;  Surgeon: Chente Moss MD;  Location:  HEART CARDIAC CATH LAB     CV RIGHT HEART CATH MEASUREMENTS RECORDED N/A 4/28/2023    Procedure: Right Heart Catheterization;  Surgeon: Aaliyah Fischer MD;  [facility]     CV SWAN LUCIANA PROCEDURE N/A 02/13/2020    Procedure: Bethel Luciana Procedure;  Surgeon: Chente Moss MD;  Location:  HEART CARDIAC CATH LAB     EP ICD Bilateral 03/16/2020    Procedure: EP ICD;  Surgeon: Dali Day MD;   Location: [facility]ATH [facility]     ESOPHAGOSCOPY, GASTROSCOPY, DUODENOSCOPY (EGD), COMBINED N/A 5/22/2023    Procedure: ESOPHAGOGASTRODUODENOSCOPY, WITH BIOPSY;  Surgeon: Myles Mota DO;  Location: [facility] GI     INCISION AND DRAINAGE CHEST WASHOUT, COMBINED N/A 12/11/2022    Procedure: INCISION AND DRAINAGE OF DRIVELINE;  Surgeon: Mac Jaramillo MD;  Location: [facility] OR     INSERT VENTRICULAR ASSIST DEVICE LEFT (HEARTMATE II) N/A 02/18/2020    Procedure: INSERTION, LEFT VENTRICULAR ASSIST DEVICE (HEARTMATE III);  Surgeon: Mac Jaramillo MD;  Location: [facility] OR     IR CVC TUNNEL PLACEMENT > 5 YRS OF AGE  02/23/2021     IR CVC TUNNEL REMOVAL RIGHT  03/16/2021     IR TRANSCATHETER BIOPSY  5/2/2023     MIDLINE INSERTION - DOUBLE LUMEN Left 12/15/2022    left basilic 5 fr dl midline 20 cm     THORACOSCOPY Right 03/06/2020    Procedure: RIGHT VIDEO-ASSISTED THORASCOPIC SURGERY, EVACUATION OF HEMOTHORAX, PLACEMENT OF CHEST TUBES;  Surgeon: William Gan MD;  Location: [facility] OR     allopurinol (ZYLOPRIM) 100 MG tablet  amiodarone (PACERONE) 200 MG tablet  ceFEPIme (MAXIPIME) 2 g vial  co-enzyme Q-10 200 MG CAPS  empagliflozin (JARDIANCE) 10 MG TABS tablet  ferrous sulfate (FEROSUL) 325 (65 Fe) MG tablet  finasteride (PROSCAR) 5 MG tablet  levothyroxine (SYNTHROID/LEVOTHROID) 100 MCG tablet  magnesium oxide (MAG-OX) 400 MG tablet  pantoprazole (PROTONIX) 40 MG EC tablet  potassium chloride ER (KLOR-CON M) 20 MEQ CR tablet  QUEtiapine (SEROQUEL) 25 MG tablet  senna-docusate (SENOKOT-S/PERICOLACE) 8.6-50 MG tablet  tamsulosin (FLOMAX) 0.4 MG capsule  torsemide (DEMADEX) 100 MG tablet  warfarin ANTICOAGULANT (COUMADIN) 3 MG tablet      Allergies   Allergen Reactions     Heparin      HIT screen positive 2/14/20, reflex DAVINA negative; however heme recommended treating as if positive  HIT screen negative 2/11/20     Chlorhexidine Rash     Oxycodone Other (See Comments) and Itching     Family History  No family history  on file.  Social History   Social History     Tobacco Use     Smoking status: Former     Types: Cigarettes     Quit date: 1994     Years since quittin.2     Smokeless tobacco: Never   Vaping Use     Vaping status: Never Used   Substance Use Topics     Alcohol use: Not Currently     Drug use: Never         A medically appropriate review of systems was performed with pertinent positives and negatives noted in the HPI, and all other systems negative.      Physical Exam   BP:  (80, doppler)  Pulse: 94  Temp: 98.2  F (36.8  C)  Resp: 20  Weight: 87.1 kg (192 lb)  SpO2: 94 %      Physical Exam  Vitals and nursing note reviewed.   Constitutional:       General: He is not in acute distress.     Appearance: Normal appearance. He is not ill-appearing, toxic-appearing or diaphoretic.   HENT:      Head: Normocephalic and atraumatic.      Right Ear: External ear normal.      Left Ear: External ear normal.      Nose: Nose normal. No congestion.      Mouth/Throat:      Mouth: Mucous membranes are moist.      Pharynx: Oropharynx is clear. No oropharyngeal exudate.   Eyes:      Extraocular Movements: Extraocular movements intact.      Conjunctiva/sclera: Conjunctivae normal.      Pupils: Pupils are equal, round, and reactive to light.   Cardiovascular:      Rate and Rhythm: Normal rate.      Pulses: Normal pulses.      Heart sounds: Normal heart sounds. No murmur heard.     No friction rub.   Pulmonary:      Effort: Respiratory distress present.      Breath sounds: No stridor. Rales present. No wheezing or rhonchi.   Abdominal:      General: Abdomen is flat. There is no distension.      Tenderness: There is no abdominal tenderness. There is no guarding or rebound.   Musculoskeletal:         General: No deformity or signs of injury. Normal range of motion.      Cervical back: Normal range of motion.   Skin:     General: Skin is warm.      Capillary Refill: Capillary refill takes less than 2 seconds.      Coloration: Skin is  not pale.      Findings: No bruising or erythema.   Neurological:      General: No focal deficit present.      Mental Status: He is alert and oriented to person, place, and time.      Cranial Nerves: No cranial nerve deficit.      Motor: No weakness.   Psychiatric:         Mood and Affect: Mood normal.         Behavior: Behavior normal.         ED Course        Procedures         Medications   bumetanide (BUMEX) injection 4 mg (has no administration in time range)   bumetanide (BUMEX) 0.25 mg/mL infusion (has no administration in time range)           Critical care was not performed.     Medical Decision Making  The patient's presentation was of moderate complexity (an acute complicated injury).    The patient's evaluation involved:  review of external note(s) from 3+ sources (see separate area of note for details)  ordering and/or review of 3+ test(s) in this encounter (see separate area of note for details)  review of 3+ test result(s) ordered prior to this encounter (see separate area of note for details)  independent interpretation of testing performed by another health professional (see separate area of note for details)    The patient's management necessitated moderate risk (prescription drug management including medications given in the ED).      Assessments & Plan (with Medical Decision Making)     Eliseo Tanner is a 67 year old male who has a history significant for chronic systolic heart failure secondary to nonischemic cardiomyopathy currently on HeartMate 3 LVAD.  He arrives today to the emergency department at the direction of his cardiologist due to concern of fluid overload.  On arrival patient to be alert, is currently afebrile lying supine in bed.  He does appear to be slightly short of breath at rest speaking in near complete sentences.  By auscultation patient has rales on inspiration.  No external signs of trauma to the chest.  Is not currently on oxygen.  I did review his notes including  cardiology notes and multiple admissions over the past several months with requirement of assistance including IV diuresis.  Patient also noted to have with recent hospital stay decrease in hemoglobin.  He does not report any significant GI loss but would plan to recheck CBC and CMP.  Chest x-ray evaluated my interpretation demonstrate mild fluid overload with no focal consolidation or signs of infection.    Laboratory studies are consistent with acute kidney injury on chronic kidney disease.  Chest x-ray and troponin/BNP consistent with fluid overload.  This patient is up on his weight.  This is despite maximal oral diuretics.  Case discussed with cardiology to attending with recommendation for admission, Bumex bolus and infusion which have been ordered and patient admitted to the hospital at this time.    EKG on my interpretation demonstrating a nonspecific intraventricular block.  I do not appreciate signs of acute ST changes.  EKG consistent with RVH and age-indeterminate inferior infarct.  Prolonged QTc appreciated.    I have reviewed the nursing notes.    I have reviewed the findings, diagnosis, plan and need for follow up with the patient.    New Prescriptions    No medications on file       Final diagnoses:   Shortness of breath   BERNARDA (acute kidney injury) (H)       NEFTALY POWER MD    6/20/2023   [facility] EMERGENCY DEPARTMENT     Neftaly Power MD  06/20/23 [id number]       Neftaly Power MD  06/20/23 7511

## 2023-06-21 NOTE — PROGRESS NOTES
Pharmacist Admission Medication History    Admission medication history is complete. The information provided in this note is only as accurate as the sources available at the time of the update.    Medication reconciliation/reorder completed by provider prior to medication history? Yes    Information Source(s): Patient and Surescripts via in-person    Pertinent Information: Pt was previously on Cipro and Dapto, was changed to current regimen of Cefepime IV 2 g q24h.    Changes made to PTA medication list:    Deleted:   o Pantoprazole 40 tab  o Senna-doc tab    Changed:   o Klor-Con 20 mEq tab: 80 meq in morning/afternoon and 60 meq in evening with 40 meq on days w/ IV BUMEX --> 80 meq tid  o Warfarin 3 mg tab: 1t;po;qday --> warfarin 4 mg tab: 2 mg on Wed; 4 mg AOD    Not including over the counter (OTC) medications, was there a time in the past 3 months when you did not take your medications as prescribed because of cost?: Unable to Assess    Allergies reviewed with patient and updates made in EHR: yes    Medication History Completed By: Daniel Mclean [facility] 6/21/2023 12:31 PM    Prior to Admission medications    Medication Sig Last Dose Taking? Auth Provider Long Term End Date   allopurinol (ZYLOPRIM) 100 MG tablet Take 2 tablets (200 mg) by mouth daily 6/20/2023 at AM Yes Alberto Garnica MD     amiodarone (PACERONE) 200 MG tablet TAKE 1 TABLET BY MOUTH ONCE DAILY 6/20/2023 at AM Yes Mervat Decker PA-C Yes    ceFEPIme (MAXIPIME) 2 g vial Inject 2 g into the vein every 24 hours 6/19/2023 at 1000 Yes Antonette Aguiar APRN CNP     co-enzyme Q-10 200 MG CAPS Take 200 mg by mouth daily 6/20/2023 at AM Yes Mono Gambino PA-C No    empagliflozin (JARDIANCE) 10 MG TABS tablet Take 1 tablet (10 mg) by mouth daily 6/20/2023 at AM Yes Jessica Dutton MD     ferrous sulfate (FEROSUL) 325 (65 Fe) MG tablet Take 1 tablet (325 mg) by mouth daily (with breakfast) 6/20/2023 at AM Yes Antonette Aguiar APRN CNP No     finasteride (PROSCAR) 5 MG tablet Take 1 tablet (5 mg) by mouth daily Helps with urinary retention. 6/20/2023 at AM Yes Jess Meraz MD     levothyroxine (SYNTHROID/LEVOTHROID) 100 MCG tablet Take 1 tablet (100 mcg) by mouth every morning (before breakfast) 6/20/2023 at AM Yes Jessica Dutton MD Yes    magnesium oxide (MAG-OX) 400 MG tablet Take 1 tablet (400 mg) by mouth 3 times daily 6/20/2023 at AM Yes Antonette Aguiar APRN CNP     potassium chloride ER (KLOR-CON M) 20 MEQ CR tablet Take 80mEq in the morning, Take 80 mEq in the afternoon, Take 80 mEq in the PM 6/20/2023 at AM Yes Nader Cisneros MD No    torsemide (DEMADEX) 100 MG tablet Take 1 tablet (100 mg) by mouth 3 times daily 6/20/2023 at AM Yes Nader Cisneros MD Yes    warfarin ANTICOAGULANT (COUMADIN) 4 MG tablet Take 2 mg by mouth on Wednesday, take 4 mg by mouth all other days 6/19/2023 at PM Yes Antonette Aguiar APRN CNP No    QUEtiapine (SEROQUEL) 25 MG tablet Take 0.5 tablets (12.5 mg) by mouth At Bedtime 6/19/2023 at PM  Antonette Aguiar APRN CNP Yes    tamsulosin (FLOMAX) 0.4 MG capsule Take 0.8 mg by mouth every evening This med helps with urinary retention 6/19/2023 at PM  Nader Cisneros MD

## 2023-06-21 NOTE — CONSULTS
Care Management Initial Consult    General Information  Assessment completed with: Patient,    Type of CM/SW Visit: Initial Assessment    Primary Care Provider verified and updated as needed: Yes   Readmission within the last 30 days: planned readmission   Return Category: Exacerbation of disease  Reason for Consult: discharge planning, care coordination/care conference, emotional/coping/adjustment concerns  Advance Care Planning:            Communication Assessment  Patient's communication style: spoken language (English or Bilingual)    Hearing Difficulty or Deaf: yes   Wear Glasses or Blind: yes    Cognitive  Cognitive/Neuro/Behavioral: WDL                      Living Environment:   People in home: grandchild(arnold), spouse  Chapis  Current living Arrangements: house      Able to return to prior arrangements: yes       Family/Social Support:  Care provided by: self, spouse/significant other  Provides care for: no one, unable/limited ability to care for self  Marital Status:   Wife  Chapis       Description of Support System: Supportive, Involved    Support Assessment: Adequate family and caregiver support    Current Resources:   Patient receiving home care services: No     Community Resources: OP Infusion  Equipment currently used at home: walker, rolling, other (see comments) (scooter)  Supplies currently used at home: Wound Care Supplies    Employment/Financial:  Employment Status: retired        Financial Concerns: No concerns identified   Referral to Financial Worker: No       Does the patient's insurance plan have a 3 day qualifying hospital stay waiver?  N/A     Lifestyle & Psychosocial Needs:  Social Determinants of Health     Tobacco Use: Medium Risk (6/6/2023)    Patient History      Smoking Tobacco Use: Former      Smokeless Tobacco Use: Never      Passive Exposure: Not on file   Alcohol Use: Not on file   Financial Resource Strain: Not on file   Food Insecurity: Not on file   Transportation Needs: Not  on file   Physical Activity: Not on file   Stress: Not on file   Social Connections: Not on file   Intimate Partner Violence: Not on file   Depression: Not at risk (4/14/2023)    PHQ-2      PHQ-2 Score: 2   Recent Concern: Depression - At risk (2/1/2023)    PHQ-2      PHQ-2 Score: 3   Housing Stability: Not on file       Functional Status:  Prior to admission patient needed assistance:   Dependent ADLs:: Ambulation-walker (motorized scooter)  Dependent IADLs:: Cleaning, Laundry, Cooking, Medication Management, Shopping       Mental Health Status:  Mental Health Status: No Current Concerns       Chemical Dependency Status:  Chemical Dependency Status: No Current Concerns             Values/Beliefs:  Spiritual, Cultural Beliefs, Anabaptism Practices, Values that affect care:                 Additional Information:  Pt known to SW from LVAD program. Pt had LVAD implanted 2/18/2020. This is pt's 5th admission, this year, for fluid management. After most recent discharge, went home with daily IV abx and IV diuretic at his local clinic. Pt continues with daily IV abx at his local clinic, but reports IV diuretic was stopped due to concerns for AK. Pt does not have coverage for home IV abx and this is why he has to do outpatient infusion.     There have been multiple goals of care conversations had with pt, wife and adult children during previous admissions. Goal has been and continues to be aggressive treatment to get to some upcoming events. Pt has a 68th birthday coming up Monday and his dtr's wedding in September.      Pt lives with his wife and granddaughter in [facility]. Pt has great support from his wife. Pt is independent w/ ADLs, IADLs, ambulation and driving to his outpatient infusion appts. Has had frequent falls at home. Pt reports that since last hospital discharge he has fallen once and that happened last week when he was getting out of the car at the clinic. Pt reports his sons have built a ramp  at home and he is using his walker and scooter.     Pt's plan is to remove fluid and discharge back home.     Writer will continue to follow. [facility] will arrange for resumption of outpatient infusion services.     PARISH Chaves

## 2023-06-21 NOTE — PROGRESS NOTES
Hours of care [id number]    Pt returned from [facility]. No acute changes or concerns at this time. LVAD #'s WDL.

## 2023-06-21 NOTE — H&P
[facility]    Cardiology History and Physical - Cardiology    Date of Admission:  6/20/2023    Assessment & Plan: SL    Eliseo Tanner is a 67 year old male admitted on 6/20/2023. He has a history of chronic systolic heart failure secondary to NICM (Stage D, NYHA Class IIIA) s/p HM 3 (2/18/2020), moderate CAD, HTN, ABHINAV on CPAP, DM2, CKD Stage III, ANA. 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 He is admitted for volume overload secondary to decompensated heart failure.     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 NYHA Class IIIB. Echo 4/28/23 septum normal, AV opening with each systole, and LVIDd 5.4 cm. RHC during last admit at 5800 rpm 4/28/23 mRA-12, mPCWP-12, JOSE Co-7, JOSE CI-3.6-note with drop in LVAD speed no significant improvement in RA with increase in wedge. BNP 9000 at admission.    Primary Cardiologist: Dr. Blayne Doe    -Fluid status: Hypervolemic, weight 197 lbs on admit from 176 lbs on recent discharge 5/31 (EDW ~178 lb)  -Diuresis: Holding PTA torsemide 100 mg TID. Started on Bumex 4 mg x1 IV and then gtt at 2 ml/hr  -RV support: None. Attempted cilostazol previously but discontinued due to epistaxis.   -ACEi/ARB/ARNi: contraindicated due to renal dysfunction  -BB: contraindicated due to RV failure  -Aldosterone antagonist: contraindicated due to renal dysfunction   -SGLT2i: Hold PTA jardiance in setting of BERNARDA  -Afterload reduction: None  -SCD prophylaxis: ICD; Cardiac device check ordered  -LDH trends: 300s, stable  -Anticoagulation: warfarin (see above), INR goal 1.7-2.3 due to epistaxis  -Antiplatelet: None  -Telemetry  -Strict I/Os, daily weights, fluid  restriction 2 L, and 2 g Na diet  -Consider echocardiogram in AM (not ordered)     # SIRS criteria  Tachycardia and leukocytosis 14.7 at admission. No signs of infection.  - Blood culture  - UA  - pta CEFEPIME    # BERNARDA vs CKD progression   Cr at 3.24 on arrival, has been stably elevated inpast between 1.9-2.2. Possible component of cardiorenal in the setting of recent decompensated heart failure. Will monitor with diuresis.  - UA with microscopy  - Avoid nephrotoxins as possible.     # Chronic LVAD drive line infection, MSSA and PSA.   Follows with LVAD ID, had recently been switched from cipro and daptomycin to cefepime on 5/16 d/t culture growing quinolone resistant PSA and no MSSA. Infection appears to be well controlled - he is afebrile on admit with elevated WBC, and driveline site is without drainage, erythema, or pain on admission.   - Continue IV cefepime (renally dosed 1g q24h)  - Consider ID consult if inpatient issues arise  - Outpatient follow-up with LVAD ID    # Hyponatremia, chronic  Na 133 at admission  - Monitor    # Hypothyroidism   - pta levothyroxine 100 mcg qAM      # GERD   - pta pantoprazole 40 mg BID      # Normocytic anemia  # Iron deficiency Anemia   Hgb 8.5 at admission. Baseline 8-9  - pta ferrous sulfate 325 every day      # BPH   - pta finasteride 5 mg every day   - pta tamsulosin 0.8 mg qPM      # T2DM   Most recent A1c 6.3 on 4/17/2023   - LDSSI  - hypoglycemia protocol   - no home medications    # Gout   - pta allopurinol     # History of Heparin Induced Thrombocytopenia   - avoid heparin products     # Recently diagnosed cirrhosis with portal hypertension  # Elevated AST, mild  Mildly elevated LFTs, stable. Had evaluation including liver biopsy during last admission which showed cirrhosis - likely multifactorial (remote EtOH misuse, NAFLD, chronic congestive hepatopathy from RV failure). No evidence of decompensation. CT A/P (5/20) with stable small volume ascites and mild  mesenteric/retroperitoneal edema/cholelithiasis. Hepatology consulted 5/30, no concern for HE though at risk for. Ensure -2 formed BM daily.     MELD-Na score: 30 at 6/20/2023  6:30 PM  MELD score: 28 at 6/20/2023  6:30 PM  Calculated from:  Serum Creatinine: 3.24 mg/dL at 6/20/2023  3:50 PM  Serum Sodium: 133 mmol/L at 6/20/2023  3:50 PM  Total Bilirubin: 0.4 mg/dL (Using min of 1 mg/dL) at 6/20/2023  3:50 PM  INR(ratio): 2.55 at 6/20/2023  6:30 PM  Age: 67 years    # ABHINAV: CPAP       Diet: Fluid restriction 2000 ML FLUID  2 Gram Sodium Diet    DVT Prophylaxis: Warfarin  Guevara Catheter: Not present  Cardiac Monitoring: ACTIVE order. Indication: Acute decompensated heart failure (48 hours)  Code Status: Full Code          Clinically Significant Risk Factors Present on Admission          # Hypocalcemia: Lowest Ca = 8.2 mg/dL in last 2 days, will monitor and replace as appropriate    # Anion Gap Metabolic Acidosis: Highest Anion Gap = 20 mmol/L in last 2 days, will monitor and treat as appropriate  # Hypoalbuminemia: Lowest albumin = 3.3 g/dL at 6/20/2023  3:50 PM, will monitor as appropriate  # Drug Induced Coagulation Defect: home medication list includes an anticoagulant medication    # Hypertension: Noted on problem list  # End stage heart failure: Ventricular assist device (VAD) present             Disposition Plan   Expected discharge: 2 - 3 days, recommended to prior living arrangement once fluid volume status optimized on oral medication.    Entered: Donald Adan Jr., MD 06/20/2023, 7:26 PM   The patient's care was discussed with the Fellow    Donald Adan Jr., MD  Internal Medicine- PGY2  [facility]  ______________________________________________________________________    Chief Complaint   Weight gain, MIRANDA    History is obtained from the patient    History of Present Illness   Eliseo Tanner is a 67 year old male who has a history of chronic systolic  heart failure secondary to NICM (Stage D, NYHA Class IIIA) s/p HM 3 (2/18/2020), moderate CAD, HTN, ABHINAV on CPAP, DM2, CKD Stage III, ANA. 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 He is admitted for volume overload secondary to decompensated heart failure.    He states that he has been gaining more weight recently even though he is compliant with medications. He states that he experiences dyspnea with exertion but none at rest. Denies any recent illnesses, cough, fever, chills, sweats, falls, dysuria. States that he has not experienced any ICD shocks or LVAD alarms. Appetite is good. No diarrhea or constipation.    Past Medical History    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)     MSSA     Legionella pneumonia (H)      LVAD (left ventricular assist device) present (H)      MSSA bacteremia 11/2020     Past Surgical History   Past Surgical History:   Procedure Laterality Date     ANESTHESIA CARDIOVERSION N/A 02/28/2020    Procedure: ANESTHESIA, FOR CARDIOVERSION;  Surgeon: GENERIC ANESTHESIA PROVIDER;  Location: [facility] OR     COLONOSCOPY N/A 5/22/2023    Procedure: COLONOSCOPY, WITH POLYPECTOMY AND BIOPSY;  Surgeon: Myles Mota DO;  Location: [facility] GI     CV CARDIOMEMS WITH RIGHT HEART CATH N/A 09/20/2022    Procedure: Pulmonary Arterial Pressure Sensor Placement CPT Codes to be cleared by financial securing for this implant. 54483 and ;  Surgeon: Dalton Baeza MD;  Location: [facility] HEART CARDIAC CATH LAB     CV CENTRAL VENOUS CATHETER PLACEMENT N/A 02/13/2020    Procedure: Central Venous Catheter Placement;  Surgeon:  Chente Moss MD;  Location:  HEART CARDIAC CATH LAB     CV INTRA AORTIC BALLOON N/A 02/07/2020    Procedure: Intra-Aortic Balloon Pump Insertion;  Surgeon: Jose Baldwin MD;  Location:  HEART CARDIAC CATH LAB     CV INTRA AORTIC BALLOON N/A 02/13/2020    Procedure: Intra-Aortic Balloon Pump Insertion;  Surgeon: Chente Moss MD;  Location:  HEART CARDIAC CATH LAB     CV RIGHT HEART CATH MEASUREMENTS RECORDED N/A 09/21/2020    Procedure: CV RIGHT HEART CATH;  Surgeon: Dalton Baeza MD;  Location:  HEART CARDIAC CATH LAB     CV RIGHT HEART CATH MEASUREMENTS RECORDED N/A 01/07/2021    Procedure: Right Heart Cath;  Surgeon: Chun Ball MD;  Location:  HEART CARDIAC CATH LAB     CV RIGHT HEART CATH MEASUREMENTS RECORDED N/A 02/10/2022    Procedure: CV RIGHT HEART CATH;  Surgeon: Dalton Baeza MD;  Location:  HEART CARDIAC CATH LAB     CV RIGHT HEART CATH MEASUREMENTS RECORDED N/A 09/20/2022    Procedure: Right Heart Catheterization [[id number]];  Surgeon: Dalton Baeza MD;  Location:  HEART CARDIAC CATH LAB     CV RIGHT HEART CATH MEASUREMENTS RECORDED N/A 12/12/2022    Procedure: Right Heart Cath;  Surgeon: Chente Moss MD;  Location:  HEART CARDIAC CATH LAB     CV RIGHT HEART CATH MEASUREMENTS RECORDED N/A 4/28/2023    Procedure: Right Heart Catheterization;  Surgeon: Aaliayh Fischer MD;  Location:  HEART CARDIAC CATH LAB     CV SWAN LUCIANA PROCEDURE N/A 02/13/2020    Procedure: [place] Luc[place] Procedure;  Surgeon: Chente Moss MD;  Location:  HEART CARDIAC CATH LAB     EP ICD Bilateral 03/16/2020    Procedure: EP ICD;  Surgeon: Dali Day MD;  Location:  HEART CARDIAC CATH LAB     ESOPHAGOSCOPY, GASTROSCOPY, DUODENOSCOPY (EGD), COMBINED N/A 5/22/2023    Procedure: ESOPHAGOGASTRODUODENOSCOPY, WITH BIOPSY;  Surgeon: Myles Mota DO;  Location:  GI     INCISION AND DRAINAGE CHEST WASHOUT, COMBINED  N/A 12/11/2022    Procedure: INCISION AND DRAINAGE OF DRIVELINE;  Surgeon: Mac Jaramillo MD;  Location: [facility] OR     INSERT VENTRICULAR ASSIST DEVICE LEFT (HEARTMATE II) N/A 02/18/2020    Procedure: INSERTION, LEFT VENTRICULAR ASSIST DEVICE (HEARTMATE III);  Surgeon: Mac Jaramillo MD;  Location: [facility] OR     IR CVC TUNNEL PLACEMENT > 5 YRS OF AGE  02/23/2021     IR CVC TUNNEL REMOVAL RIGHT  03/16/2021     IR TRANSCATHETER BIOPSY  5/2/2023     MIDLINE INSERTION - DOUBLE LUMEN Left 12/15/2022    left basilic 5 fr dl midline 20 cm     THORACOSCOPY Right 03/06/2020    Procedure: RIGHT VIDEO-ASSISTED THORASCOPIC SURGERY, EVACUATION OF HEMOTHORAX, PLACEMENT OF CHEST TUBES;  Surgeon: William Gan MD;  Location: [facility] OR     Prior to Admission Medications   Prior to Admission Medications   Prescriptions Last Dose Informant Patient Reported? Taking?   QUEtiapine (SEROQUEL) 25 MG tablet   No No   Sig: Take 0.5 tablets (12.5 mg) by mouth At Bedtime   allopurinol (ZYLOPRIM) 100 MG tablet   No No   Sig: Take 2 tablets (200 mg) by mouth daily   amiodarone (PACERONE) 200 MG tablet   No No   Sig: TAKE 1 TABLET BY MOUTH ONCE DAILY   ceFEPIme (MAXIPIME) 2 g vial   Yes No   Sig: Inject 2 g into the vein every 24 hours   co-enzyme Q-10 200 MG CAPS  Self Yes No   Sig: Take 200 mg by mouth daily   empagliflozin (JARDIANCE) 10 MG TABS tablet   No No   Sig: Take 1 tablet (10 mg) by mouth daily   ferrous sulfate (FEROSUL) 325 (65 Fe) MG tablet   No No   Sig: Take 1 tablet (325 mg) by mouth daily (with breakfast)   finasteride (PROSCAR) 5 MG tablet  Self No No   Sig: Take 1 tablet (5 mg) by mouth daily Helps with urinary retention.   levothyroxine (SYNTHROID/LEVOTHROID) 100 MCG tablet   No No   Sig: Take 1 tablet (100 mcg) by mouth every morning (before breakfast)   magnesium oxide (MAG-OX) 400 MG tablet   No No   Sig: Take 1 tablet (400 mg) by mouth 3 times daily   pantoprazole (PROTONIX) 40 MG EC tablet   No No   Sig: Take 1  tablet (40 mg) by mouth 2 times daily (before meals)   potassium chloride ER (KLOR-CON M) 20 MEQ CR tablet   Yes No   Sig: Take 80mEq in the morning, Take 80 mEq in the afternoon, Take 60 mEq in the PM  Take an additional 40 mEq on days you get IV Bumex   senna-docusate (SENOKOT-S/PERICOLACE) 8.6-50 MG tablet  Self Yes No   Sig: Take 1 tablet by mouth 2 times daily as needed for constipation   tamsulosin (FLOMAX) 0.4 MG capsule  Self Yes No   Sig: Take 0.8 mg by mouth every evening This med helps with urinary retention   torsemide (DEMADEX) 100 MG tablet   No No   Sig: Take 1 tablet (100 mg) by mouth 3 times daily   warfarin ANTICOAGULANT (COUMADIN) 3 MG tablet   Yes No   Sig: Take 1 tablet (3 mg) by mouth daily      Facility-Administered Medications: None      Review of Systems    The 10 point Review of Systems is negative other than noted in the HPI or here.     Social History   I have reviewed this patient's social history and updated it with pertinent information if needed.  Social History     Tobacco Use     Smoking status: Former     Types: Cigarettes     Quit date: 1994     Years since quittin.2     Smokeless tobacco: Never   Vaping Use     Vaping status: Never Used   Substance Use Topics     Alcohol use: Not Currently     Drug use: Never     Allergies   Allergies   Allergen Reactions     Heparin      HIT screen positive 20, reflex DAVINA negative; however heme recommended treating as if positive  HIT screen negative 20     Chlorhexidine Rash     Oxycodone Other (See Comments) and Itching        Physical Exam   Vital Signs: Temp: 97.7  F (36.5  C) Temp src: Oral BP: 102/46 Pulse: 101   Resp: 18 SpO2: 98 % O2 Device: None (Room air)    Weight: 197 lbs 8.51 oz    General Appearance: Age-appearing male in no acute distress  Respiratory: breathing comfortably on room air, bibasilar crackles auscultated  Cardiovascular: tachycardic rate, regular rhythm. JVP to angle of mandible. Bilateral lower  extremity edema to knee 2+  GI: obese, non-tender  Skin: venous changes in bilateral lower extremities  Other: Neurological assessment intact    Medical Decision Making   Please see A&P for additional details of medical decision making.      Data     I have personally reviewed the following data over the past 24 hrs:    14.7 (H)  \   8.5 (L)   / 181     133 (L) 99 101.0 (H) /  142 (H)   3.6 14 (L) 3.24 (H) \       ALT: 44 AST: 46 (H) AP: 135 (H) TBILI: 0.4   ALB: 3.3 (L) TOT PROTEIN: 7.0 LIPASE: N/A       Trop: 163 (HH) BNP: 8,824 (H)       Procal: N/A CRP: N/A Lactic Acid: 1.1       INR:  2.55 (H) PTT:  N/A   D-dimer:  N/A Fibrinogen:  N/A       Imaging results reviewed over the past 24 hrs:   Recent Results (from the past 24 hour(s))   XR Chest 2 Views    Narrative    XR CHEST 2 VIEWS  6/20/2023 2:44 PM      HISTORY: SOB    COMPARISON: 5/25/2023, 4/17/2023    FINDINGS: PA and lateral views. Stable LVAD and left chest wall  automatic implantable cardiac defibrillator lead. Right arm PICC tip  projects over the right atrium. Sternotomy is intact. Stable enlarged  cardiac silhouette. Improved streaky perihilar opacities compared to  5/25/2023. No substantial pleural effusion or pneumothorax.      Impression    IMPRESSION:   No acute airspace opacities. Improved streaky perihilar  atelectasis/edema compared to 5/25/2023    I have personally reviewed the examination and initial interpretation  and I agree with the findings.    QUIQUE NICHOLS MD         SYSTEM ID:  [id number]

## 2023-06-21 NOTE — PROGRESS NOTES
[facility]    Cardiology Progress Note- Cardiology        Date of Admission:  6/20/2023     Assessment & Plan: S    Eliseo Tanner is a 67 year old male admitted on 6/20/2023. He has a history of chronic systolic heart failure secondary to NICM (Stage D, NYHA Class IIIA) s/p HM 3 (2/18/2020), moderate CAD, HTN, ABHINAV on CPAP, DM2, CKD Stage III, ANA. 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 He is admitted for volume overload secondary to decompensated heart failure.    Today 6/21:  - Plan for R heart cath today   - ordered PIV   - continue aggressive diuresis      # Acute on chronic systolic heart failure/HFrEF (EF 10-20%) secondary to NICM  # Acute on chronic RV failure  # s/p HM3 LVAD  Stage D. NYHA Class IIIB.   Echo 4/28/23 septum normal, AV opening with each systole, and LVIDd 5.4 cm. RHC during last admit at 5800 rpm 4/28/23 mRA-12, mPCWP-12, JOSE Co-7, JOSE CI-3.6-note with drop in LVAD speed no significant improvement in RA with increase in wedge. BNP 9000 at admission.  - Plan for RHC today, NPO. Okay for diet after cath  - continue aggressive diuresis with bumex gtt  -Telemetry, Strict I/Os, daily weights, fluid restriction 2 L, and 2 g Na diet    GDMT  Primary Cardiologist: Dr. Blayne Doe   -Fluid status: Hypervolemic, weight 197 lbs on admit from 176 lbs on recent discharge 5/31 (EDW ~178 lb)  -Diuresis: Holding PTA torsemide 100 mg TID. Started on Bumex 4 mg x1 IV and then gtt at 2 ml/hr    --> will consider adding inotrope based on RHC results  -RV support: None. Attempted cilostazol previously but discontinued due to epistaxis.   -ACEi/ARB/ARNi: contraindicated due to renal dysfunction  -BB: contraindicated due to RV failure  -Aldosterone  antagonist: contraindicated due to renal dysfunction   -SGLT2i: Hold PTA jardiance in setting of BERNARDA  -Afterload reduction: None  -SCD prophylaxis: ICD; Cardiac device check ordered  -LDH trends: 300s, stable  -Anticoagulation: warfarin (see above), INR goal 1.7-2.3 due to epistaxis  -Antiplatelet: None  -LVAD: destination therapy given hx and not candidate      # SIRS criteria  Tachycardia and leukocytosis 14.7 at admission. No signs of infection. Blood culture NG x 12H, UA unremarkable.   - Continue home Cefepime infusion, has R sided PICC line (previously placed)     # BERNARDA vs CKD progression   Cr at 3.24 on arrival, has been stably elevated inpast between 1.9-2.2. Possible component of cardiorenal in the setting of recent decompensated heart failure. UA with microscopy showed 20 protein and small blood.   - Avoid nephrotoxins as possible.   - Will monitor with diuresis.     # Chronic LVAD drive line infection, MSSA and PSA.   Follows with LVAD ID, had recently been switched from cipro and daptomycin to cefepime on 5/16 d/t culture growing quinolone resistant PSA and no MSSA. Infection appears to be well controlled - he is afebrile on admit with elevated WBC, and driveline site is without drainage, erythema, or pain on admission.   - Continue IV cefepime (renally dosed 1g q24h)  - Outpatient follow-up with LVAD ID     # Hyponatremia, chronic  Na 133 at admission, suspect hypervolemic hyponatremia in setting of CHF.   - Monitor     # Hypothyroidism   - pta levothyroxine 100 mcg qAM      # GERD   - pta pantoprazole 40 mg BID      # Normocytic anemia  # Iron deficiency Anemia   Hgb 8.5 at admission. Baseline 8-9  - pta ferrous sulfate 325 every day      # BPH   - pta finasteride 5 mg every day   - pta tamsulosin 0.8 mg qPM      # T2DM   Most recent A1c 6.3 on 4/17/2023   - LDSSI  - hypoglycemia protocol   - no home medications     # Gout   - Continue Allopurinol 200 g daily (confirmed that no further dose adjustment  needed given renal function decreased)     # History of Heparin Induced Thrombocytopenia   - avoid heparin products      # Recently diagnosed cirrhosis with portal hypertension  # Elevated AST, mild  Mildly elevated LFTs, stable. Had evaluation including liver biopsy during last admission which showed cirrhosis - likely multifactorial (remote EtOH misuse, NAFLD, chronic congestive hepatopathy from RV failure). No evidence of decompensation. CT A/P (5/20) with stable small volume ascites and mild mesenteric/retroperitoneal edema/cholelithiasis. Hepatology consulted 5/30, no concern for HE though at risk for. Ensure -2 formed BM daily.      MELD-Na score: 30 at 6/20/2023  6:30 PM  MELD score: 28 at 6/20/2023  6:30 PM  Calculated from:  Serum Creatinine: 3.24 mg/dL at 6/20/2023  3:50 PM  Serum Sodium: 133 mmol/L at 6/20/2023  3:50 PM  Total Bilirubin: 0.4 mg/dL (Using min of 1 mg/dL) at 6/20/2023  3:50 PM  INR(ratio): 2.55 at 6/20/2023  6:30 PM  Age: 67 years     # ABHINAV: CPAP     Diet: Fluid restriction 2000 ML FLUID  2 Gram Sodium Diet    DVT Prophylaxis: Warfarin  Guevara Catheter: Not present  Cardiac Monitoring: ACTIVE order. Indication: Acute decompensated heart failure (48 hours)  Code Status: Full Code          Clinically Significant Risk Factors Present on Admission        # Hypokalemia: Lowest K = 3.2 mmol/L in last 2 days, will replace as needed   # Hypocalcemia: Lowest Ca = 8.2 mg/dL in last 2 days, will monitor and replace as appropriate    # Anion Gap Metabolic Acidosis: Highest Anion Gap = 20 mmol/L in last 2 days, will monitor and treat as appropriate  # Hypoalbuminemia: Lowest albumin = 3.3 g/dL at 6/20/2023  3:50 PM, will monitor as appropriate  # Drug Induced Coagulation Defect: home medication list includes an anticoagulant medication    # Hypertension: Noted on problem list  # End stage heart failure: Ventricular assist device (VAD) present              Disposition Plan   Expected discharge: 4 - 7  days, recommended to prior living arrangement once fluid volume status optimized on oral medication.    Entered: Sarah Jeffries MD 06/21/2023, 9:36 AM   The patient's care was discussed with the Patient, cardiology fellow and attending physicina, Dr Destiny Salmeron.       Sarah Jeffries MD  Internal Medicine-PGY2  [facility]      ______________________________________________________________________    Interval History   NAEO. Pt seen and examined at bedside. Pt states he has been having progressive MIRANDA since he was last discharged on 5/31. No SOB at rest. Appeitie is good, but he has lost some muscle mass. Has been hospitalized about 5 times in the last 6 months per patient. Patient understands he has severe heart failure. Denies  fevers, chills, CP, abd pain, N/V/D.       Physical Exam   Vital Signs: Temp: 97.4  F (36.3  C) Temp src: Oral BP: (!) 103/91 Pulse: 73   Resp: 18 SpO2: (!) 87 % O2 Device: None (Room air) (when awake)    Weight: 192 lbs 4.8 oz    General Appearance:  Age-appearing male in no acute distress, sitting comfortably in bed  Respiratory: breathing comfortably on room air, bibasilar crackles auscultated  Cardiovascular: tachycardic rate, regular rhythm. JVP to angle of mandible. Bilateral lower extremity pitting edema to knee 2+  GI: obese, non tender, distended abdomen, no RRG, soft  Skin: venous changes in bilateral lower extremities  Other:  Neurological assessment intact    Medical Decision Making       Please see A&P for additional details of medical decision making.      Data     I have personally reviewed the following data over the past 24 hrs:    10.6  \   8.4 (L)   / 175     135 (L) 100 100.0 (H) /  125 (H)   3.2 (L) 18 (L) 3.22 (H) \       ALT: 44 AST: 46 (H) AP: 135 (H) TBILI: 0.4   ALB: 3.3 (L) TOT PROTEIN: 7.0 LIPASE: N/A       Trop: 163 (HH) BNP: 8,824 (H)       Procal: N/A CRP: N/A Lactic Acid: 1.1       INR:  2.45 (H) PTT:  N/A   D-dimer:  N/A Fibrinogen:  N/A        Ferritin:  N/A % Retic:  N/A LDH:  386 (H)       Imaging results reviewed over the past 24 hrs:   Recent Results (from the past 24 hour(s))   XR Chest 2 Views    Narrative    XR CHEST 2 VIEWS  6/20/2023 2:44 PM      HISTORY: SOB    COMPARISON: 5/25/2023, 4/17/2023    FINDINGS: PA and lateral views. Stable LVAD and left chest wall  automatic implantable cardiac defibrillator lead. Right arm PICC tip  projects over the right atrium. Sternotomy is intact. Stable enlarged  cardiac silhouette. Improved streaky perihilar opacities compared to  5/25/2023. No substantial pleural effusion or pneumothorax.      Impression    IMPRESSION:   No acute airspace opacities. Improved streaky perihilar  atelectasis/edema compared to 5/25/2023    I have personally reviewed the examination and initial interpretation  and I agree with the findings.    QUIQUE NICHOLS MD         SYSTEM ID:  [id number]   Cardiac Device Check - Inpatient   Result Value    Date Time Interrogation Session [id number]    Implantable Pulse Generator  Medtronic    Implantable Pulse Generator Model [id number] Visia AF MRI VR    Implantable Pulse Generator Serial Number [id number]    Type Interrogation Session In Clinic    Clinic Name [facility]    Implantable Pulse Generator Type Defibrillator    Implantable Pulse Generator Implant Date 20200316    Implantable Lead  Medtronic    Implantable Lead Model 6935M [employer] Quattro Secure S MRI SureScan    Implantable Lead Serial Number [id number]    Implantable Lead Implant Date 20200316    Implantable Lead Polarity Type Tripolar Lead    Implantable Lead Location Detail 1 UNKNOWN    Implantable Lead Special Function 62cm length    Implantable Lead Location Right Ventricle    Glenn Setting Mode (NBG Code) VVIR    Glenn Setting Lower Rate Limit 60    Glenn Setting Maximum Sensor Rate 115    Glenn Setting Hysterisis Rate DISABLED    Lead Channel Setting Sensing Polarity  Bipolar    Lead Channel Setting Sensing Anode Location Right Ventricle    Lead Channel Setting Sensing Anode Terminal Ring    Lead Channel Setting Sensing Cathode Location Right Ventricle    Lead Channel Setting Sensing Cathode Terminal Tip    Lead Channel Setting Sensing Sensitivity 0.3    Lead Channel Setting Pacing Polarity Bipolar    Lead Channel Setting Pacing Anode Location Right Ventricle    Lead Channel Setting Pacing Anode Terminal Ring    Lead Channel Setting Sensing Cathode Location Right Ventricle    Lead Channel Setting Sensing Cathode Terminal Tip    Lead Channel Setting Pacing Pulse Width 0.4    Lead Channel Setting Pacing Amplitude 2    Lead Channel Setting Pacing Capture Mode Adaptive    Zone Setting Type Category VF    Zone Setting Detection Interval 270    Zone Setting Detection Beats Numerator 30    Zone Setting Detection Beats Denominator 40    Zone Setting Type Category VT    Zone Setting Detection Interval Blank    Zone Setting Type Category VT    Zone Setting Detection Interval 460    Zone Setting Type Category VT    Zone Setting Detection Interval 500    Zone Setting Type Category ATRIAL_FIBRILLATION    Zone Setting Type Category AT/AF    Lead Channel Impedance Value 399    Lead Channel Impedance Value 304    Lead Channel Sensing Intrinsic Amplitude 10.25    Lead Channel Pacing Threshold Amplitude 0.75    Lead Channel Pacing Threshold Pulse Width 0.4    Battery Date Time of Measurements 42650127642064    Battery Status Middle of Service    Battery RRT Trigger 2.727    Battery Remaining Longevity 100    Battery Voltage 2.99    Capacitor Charge Type Reformation    Capacitor Last Charge Date Time 11147444057238    Capacitor Charge Time 3.743    Capacitor Charge Energy 18    Glenn Statistic Date Time Start 88265595216361    Glenn Statistic Date Time End 19941127825841    Glenn Statistic RV Percent Paced 0.36    Atrial Tachy Statistic Date Time Start 27988153762956    Atrial Tachy Statistic Date  Time End 59759230380214    Atrial Tachy Statistic AT/AF [place] Percent 0.3    Therapy Statistic Recent Shocks Delivered 0    Therapy Statistic Recent Shocks Aborted 0    Therapy Statistic Recent ATP Delivered 0    Therapy Statistic Recent Date Time Start 33658219664108    Therapy Statistic Recent Date Time End 00553606605569    Therapy Statistic Total Shocks Delivered 0    Therapy Statistic Total Shocks Aborted 0    Therapy Statistic Total ATP Delivered 0    Therapy Statistic Total  Date Time Start 20592806378033    Therapy Statistic Total  Date Time End 08338726948523    Episode Statistic Recent Count 17    Episode Statistic Type Category AT/AF    Episode Statistic Recent Count 0    Episode Statistic Type Category SVT    Episode Statistic Recent Count 0    Episode Statistic Type Category VT    Episode Statistic Recent Count 0    Episode Statistic Type Category VF    Episode Statistic Recent Count 0    Episode Statistic Type Category VT    Episode Statistic Recent Count 0    Episode Statistic Type Category VT    Episode Statistic Recent Count 0    Episode Statistic Type Category VT    Episode Statistic Recent Date Time Start 99731468373979    Episode Statistic Recent Date Time End 99070915752215    Episode Statistic Recent Date Time Start 80574976696528    Episode Statistic Recent Date Time End 27617348684105    Episode Statistic Recent Date Time Start 44051398009562    Episode Statistic Recent Date Time End 89780174369629    Episode Statistic Recent Date Time Start 66223322244408    Episode Statistic Recent Date Time End 34804019699216    Episode Statistic Recent Date Time Start 28205403754822    Episode Statistic Recent Date Time End 51927468565768    Episode Statistic Recent Date Time Start 33852474507986    Episode Statistic Recent Date Time End 99367210690756    Episode Statistic Recent Date Time Start 16835860866290    Episode Statistic Recent Date Time End 91716074141259    Episode Statistic Total Count 259     Episode Statistic Type Category AT/AF    Episode Statistic Total Count 0    Episode Statistic Type Category SVT    Episode Statistic Total Count 0    Episode Statistic Type Category VT    Episode Statistic Total Count 0    Episode Statistic Type Category VF    Episode Statistic Total Count 0    Episode Statistic Type Category VT    Episode Statistic Total Count 0    Episode Statistic Type Category VT    Episode Statistic Total Count 2    Episode Statistic Type Category VT    Episode Statistic Total Date Time Start 36103550494285    Episode Statistic Total Date Time End 32783583144855    Episode Statistic Total Date Time Start 35446290697089    Episode Statistic Total Date Time End 75477395443914    Episode Statistic Total Date Time Start 53245224085806    Episode Statistic Total Date Time End 48849761943409    Episode Statistic Total Date Time Start 18394289592701    Episode Statistic Total Date Time End 95451210085166    Episode Statistic Total Date Time Start 55521909255961    Episode Statistic Total Date Time End 31854150858273    Episode Statistic Total Date Time Start 45225336280720    Episode Statistic Total Date Time End 92962728837843    Episode Statistic Total Date Time Start 87602622465537    Episode Statistic Total Date Time End 51441288677192    Episode Identifier 261    Episode Type Category Monitor    Episode Date Time 92558133657238    Episode Duration 360    Episode Identifier 260    Episode Type Category Monitor    Episode Date Time 35459398743084    Episode Duration 480    Episode Identifier 259    Episode Type Category Monitor    Episode Date Time 99513302542848    Episode Duration 360    Episode Identifier 258    Episode Type Category Monitor    Episode Date Time 05649866897922    Episode Duration 600    Episode Identifier 257    Episode Type Category Monitor    Episode Date Time 97009843436807    Episode Duration 360    Episode Identifier 256    Episode Type Category Monitor    Episode Date  Time 20230607214710    Episode Duration 960    Episode Identifier 255    Episode Type Category Monitor    Episode Date Time 20230607035110    Episode Duration 720    Episode Identifier 254    Episode Type Category Monitor    Episode Date Time 20230607023910    Episode Duration 360    Episode Identifier 253    Episode Type Category Monitor    Episode Date Time 20230607022310    Episode Duration 600    Episode Identifier 252    Episode Type Category Monitor    Episode Date Time 20230606074710    Episode Duration 360    Episode Identifier 251    Episode Type Category Monitor    Episode Date Time 20230606035710    Episode Duration 480    Episode Identifier 250    Episode Type Category Monitor    Episode Date Time 20230605013510    Episode Duration 480    Episode Identifier 249    Episode Type Category Monitor    Episode Date Time 20230531050110    Episode Duration 1,800    Episode Identifier 248    Episode Type Category Monitor    Episode Date Time 20230528065910    Episode Duration 1,200    Episode Identifier 247    Episode Type Category Monitor    Episode Date Time 20230528003510    Episode Duration 1,560    Episode Identifier 246    Episode Type Category Monitor    Episode Date Time 48190513102973    Episode Duration 360    Episode Identifier 245    Episode Type Category Monitor    Episode Date Time 20230522042910    Episode Duration 480    Narrative    Patient seen on U [address] [place] [address] for evaluation and iterative programming of their ICD per MD orders.     Device: [employer] [id number] Visia AF MRI VR  Normal device function.  Mode: VVI 40 bpm  : 0.4%  Intrinsic rhythm:  bpm  Thoracic Impedance: Below reference line, suggests possible intrathoracic fluid accumulation.  Short V-V intervals: 0  Lead Trends Appear Stable:.  Estimated battery longevity to RRT = 8.3 years. Battery voltage = 3.01 V.   Atrial Arrhythmia: 17 AT/AF episodes detected, longest episode 30 min, Avg V rate 100 bpm.  AF [place]: 0.3%  Anticoagulant:  Warfarin  Ventricular Arrhythmia: None  Setting Changes: None    Plan: Continue to follow patient throughout hospitalization then, device follow-up every 3 months.    Candis Gaytan RN    Single lead ICD    I have reviewed and interpreted the device interrogation, settings, programming and nurse's summary. The device is functioning within normal device parameters. I agree with the current findings, assessment and plan.

## 2023-06-21 NOTE — PLAN OF CARE
Status: H/O chronic systolic heart failure secondary to NICM s/p HM 3 (2/18/2020), moderate CAD, HTN, ABHINAV on CPAP, DM2, CKD Stage III, ANA.  Afib w/AVR s/p DCCV on 2/28, and a chronic drive line infection with MSSA on IV antibiotics. Admitted with volume overload.     Neuro: A/Ox4. [place].   Cardiac: Paced with HR 90's. MAPs 80's, LVAD numbers WDL   Respiratory: O2 sats stable on RA, has ABHINAV, needs cord from home CPAP brought in from home.   GI/: Incontinent of urine, uses urinal. Primofit applied.   Diet/appetite: 2gm NA  Activity:  Up with assist of one and walker, bed alarm on due to recent falls.  Pain: Denies pain  Skin:  Numerous dark purple bruises on arms, 2 skin tears, drivelline,   LDA's: SL PICC  Bumex gtt at 2mg/hr  Q 24 hr IV atb for drive line infection    Plan: Continue with POC. Notify primary team with changes.

## 2023-06-21 NOTE — PROGRESS NOTES
06/21/23 1415   Appointment Info   Signing Clinician's Name / Credentials (PT) Ru Qureshi, PT   Rehab Comments (PT) LVAD   Living Environment   People in Home spouse;grandchild(arnold)   Current Living Arrangements house   Home Accessibility no concerns  (ramp to enter)   Transportation Anticipated family or friend will provide   Living Environment Comments granddaughter. He states there is now a ramp to enter if needed, and stairs within the house (to the basement) that he does not need to use.   Self-Care   Usual Activity Tolerance good   Current Activity Tolerance fair   Regular Exercise No   Equipment Currently Used at Home walker, rolling;other (see comments)   Fall history within last six months yes   Number of times patient has fallen within last six months 2  (fell out of the car after a long car ride)   Activity/Exercise/Self-Care Comment Patient mostly independent w/ ADLs at home; his wife is still working and so he is home alone most of the day. Very limited activity tolerance but enjoys working on projects in the garage.   General Information   Onset of Illness/Injury or Date of Surgery 06/20/23   Referring Physician Sarah Jeffries MD   Patient/Family Therapy Goals Statement (PT) return home   Pertinent History of Current Problem (include personal factors and/or comorbidities that impact the POC) Eliseo Tanner is a 67 year old male admitted on 6/20/2023. He has a history of chronic systolic heart failure secondary to NICM (Stage D, NYHA Class IIIA) s/p  3 (2/18/2020), moderate CAD, HTN, ABHINAV on CPAP, DM2, CKD Stage III, ANA. 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 He is admitted for volume overload secondary to decompensated heart failure.   Existing Precautions/Restrictions fall   Cognition   Affect/Mental  Status (Cognition) WFL   Orientation Status (Cognition) oriented to;place;person;situation   Strength (Manual Muscle Testing)   Strength Comments generalized weakness and deconditioning as demonstrated by slow mobility and limited activity tolerance.   Bed Mobility   Comment, (Bed Mobility) SBA   Transfers   Comment, (Transfers) Close SBA sit<>stand   Gait/Stairs (Locomotion)   Comment, (Gait/Stairs) Ambulates 10 ft with bilat UE support on IV pole, shuffled gait, slow moving   Balance   Balance Comments relies on UE support in standing   Clinical Impression   Criteria for Skilled Therapeutic Intervention Yes, treatment indicated   PT Diagnosis (PT) impaired functional mobility   Influenced by the following impairments weakness, deconditioning   Functional limitations due to impairments ambulation, endurance   Clinical Presentation (PT Evaluation Complexity) Stable/Uncomplicated   Clinical Presentation Rationale   (clinical judgment)   Clinical Decision Making (Complexity) low complexity   Planned Therapy Interventions (PT) balance training;gait training;home exercise program;neuromuscular re-education;strengthening   Risk & Benefits of therapy have been explained evaluation/treatment results reviewed;care plan/treatment goals reviewed;risks/benefits reviewed;current/potential barriers reviewed;participants voiced agreement with care plan;participants included;patient   PT Total Evaluation Time   PT Eval, Low Complexity Minutes (00120) 9   Physical Therapy Goals   PT Frequency 5x/week   PT Predicted Duration/Target Date for Goal Attainment 07/08/23   PT Goals Bed Mobility;Transfers;Gait;Aerobic Activity   PT: Bed Mobility Independent;Supine to/from sit   PT: Transfers Modified independent;Bed to/from chair   PT: Gait Modified independent;Greater than 200 feet  (LRAD and no LOB)   PT: Perform aerobic activity with stable cardiovascular response 15 minutes;continuous activity;NuStep;ambulation   PT Discharge Planning    PT Plan progress gait with 4WW, HEP   PT Discharge Recommendation (DC Rec) home with assist;home with home care physical therapy   PT Rationale for DC Rec Patient slightly below his baseline mobility, quick to fatigue but able to transfer SBA and ambulate short distance with UE support. Anticipate with continued progress he will be able to DC home with continued family support and possible home care therapy.   PT Brief overview of current status Ax1 with FWW and gait belt   Total Session Time   Total Session Time (sum of timed and untimed services) 9

## 2023-06-22 NOTE — PROGRESS NOTES
Indication of Interrogation:  Heart failure, eval hemodynamic fxn, flows/PI    Type of VAD:  Heartmate 3    Current Parameters:  Flow= 5.3 lpm, Speed= 5900 rpm, Power= 5.1 muhammad, PI= 3.1    Abnormal Alarm on History:  No    Abnormal Events/Parameters Notes:  Yes, explain: Noted that speed was increased yesterday per team    Changes Made during Interrogation:  No    D: Stopped by to see patient. No VAD related questions or concerns at this time.   I: Discussed POC and provided support and listened to patient's thoughts and concerns.  P: Continue to follow patient and address any questions or concerns patient and or caregiver may have.

## 2023-06-22 NOTE — PROGRESS NOTES
[facility]   Cardiology II Service / Advanced Heart Failure  Daily Progress Note      Patient: Eliseo Tanner  MRN: [id number]  Admission Date: 6/20/2023  Hospital Day # 2    Assessment and Plan: Eliseo Tanner is a 67 year old male admitted on 6/20/2023. He has a history of chronic systolic heart failure secondary to NICM (Stage D, NYHA Class IIIA) s/p HM 3 (2/18/2020), moderate CAD, HTN, ABHINAV on CPAP, DM2, CKD Stage III, ANA. 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 He is admitted for volume overload secondary to decompensated heart failure.    Today's Plan:  - Holding bumex gtt pending k recheck  - Will start dobutamine later today  - Replace single lumen PICC with a double lumen picc for inotropes  - Resume PTA Kcl 80 meq TID and continue protocol    # Acute on chronic systolic heart failure/HFrEF (EF 10-20%) secondary to NICM  # Acute on chronic RV failure  # s/p HM3 LVAD  Stage D. NYHA Class IIIB.   Echo 4/28/23 septum normal, AV opening with each systole, and LVIDd 5.4 cm. RHC during last admit at 5800 rpm 4/28/23 mRA-12, mPCWP-12, JOSE Co-7, JOSE CI-3.6-note with drop in LVAD speed no significant improvement in RA with increase in wedge. BNP 9000 at admission.  - Increased speed to 5900  - Will trial an inotrope- dobutamine 2.5 mcg/kg/min  - Continue aggressive diuresis with bumex gtt (held briefly today for K, resuming at 5pm)  - Telemetry, Strict I/Os, daily weights, fluid restriction 2 L, and 2 g Na diet     GDMT  Primary Cardiologist: Dr. Blayne Doe   -Fluid status: Hypervolemic, weight 197 lbs on admit from 176 lbs on recent discharge 5/31 (EDW ~178 lb)  -Diuresis: Holding PTA torsemide 100 mg TID. Started on Bumex 4 mg x1 IV and then gtt at 2 ml/hr               --> will consider adding inotrope  based on RHC results  -RV support: None. Attempted cilostazol previously but discontinued due to epistaxis.   -ACEi/ARB/ARNi: contraindicated due to renal dysfunction  -BB: contraindicated due to RV failure  -Aldosterone antagonist: contraindicated due to renal dysfunction   -SGLT2i: Hold PTA jardiance in setting of BERNARDA  -Afterload reduction: None  -SCD prophylaxis: ICD; Cardiac device check ordered  -LDH trends: 300s, stable  -Anticoagulation: warfarin (see above), INR goal 1.7-2.3 due to epistaxis  -Antiplatelet: None  -LVAD: destination therapy given hx and not candidate      # SIRS criteria  Tachycardia and leukocytosis 14.7 at admission. No signs of infection. Blood culture NG x 12H, UA unremarkable. Leukocytosis now resolved. Low concern for uncontrolled infection.  - Continue home Cefepime infusion, has R sided PICC line (previously placed)     # BERNARDA vs CKD progression   Cr at 3.24 on arrival, has been stably elevated inpast between 1.9-2.2. Possible component of cardiorenal in the setting of recent decompensated heart failure. UA with microscopy showed 20 protein and small blood.   - Avoid nephrotoxins as possible.   - Will monitor with diuresis.  - Consider renal ultrasound tomorrow if not improving     # Chronic LVAD drive line infection, MSSA and PSA.   Follows with LVAD ID, had recently been switched from cipro and daptomycin to cefepime on 5/16 d/t culture growing quinolone resistant PSA and no MSSA. Infection appears to be well controlled - he is afebrile on admit with elevated WBC, and driveline site is without drainage, erythema, or pain on admission.   - Continue IV cefepime (renally dosed 1g q24h)  - Outpatient follow-up with LVAD ID     # Hyponatremia, chronic  Na 133 at admission, suspect hypervolemic hyponatremia in setting of CHF.   - Monitor     # Hypothyroidism   - pta levothyroxine 100 mcg qAM      # GERD   - pta pantoprazole 40 mg BID      # Normocytic anemia  # Iron deficiency Anemia   Hgb  8.5 at admission. Baseline 8-9  - pta ferrous sulfate 325 every day      # BPH   - pta finasteride 5 mg every day   - pta tamsulosin 0.8 mg qPM      # T2DM   Most recent A1c 6.3 on 4/17/2023   - LDSSI  - hypoglycemia protocol   - no home medications     # Gout   - Continue Allopurinol 200 g daily (confirmed that no further dose adjustment needed given renal function decreased)     # History of Heparin Induced Thrombocytopenia   - avoid heparin products      # Recently diagnosed cirrhosis with portal hypertension  # Elevated AST, mild  Mildly elevated LFTs, stable. Had evaluation including liver biopsy during last admission which showed cirrhosis - likely multifactorial (remote EtOH misuse, NAFLD, chronic congestive hepatopathy from RV failure). No evidence of decompensation. CT A/P (5/20) with stable small volume ascites and mild mesenteric/retroperitoneal edema/cholelithiasis. Hepatology consulted 5/30, no concern for HE though at risk for. Ensure -2 formed BM daily.         # ABHINAV: CPAP        Diet: Fluid restriction 2000 ML FLUID  2 Gram Sodium Diet    DVT Prophylaxis: Warfarin  Guevara Catheter: Not present  Cardiac Monitoring: ACTIVE order. Indication: Acute decompensated heart failure (48 hours)  Code Status: Full Code         Mervat Decker PA-C  Advanced Heart Failure/Cardiology II Service  Pager [phone] [facility] [phone]      Patient discussed with Dr. Salmeron.        45 minutes spent on the date of the encounter doing chart review, history and exam, documentation and further activities per the note    ================================================================    Subjective/24-Hr Events:   Last 24 hr care team notes reviewed. Feeling a bit better today. Breathing feels good. Fluid is going down. Still has fluid in his abdomen. No lightheadedness or dizziness. No nause or vomiting. No bleeding symptoms. Driveling drainage is controled. No fevers or chills. No confusion this admission.    ROS:  4 point  ROS including respiratory, CV, GI and  (other than that noted in the HPI) is negative.     Medications: Reviewed in EPIC.     Physical Exam:   BP 91/67 (BP Location: Left arm)   Pulse 104   Temp 97.6  F (36.4  C)   Resp 18   Wt 84.8 kg (187 lb)   SpO2 98%   BMI 29.29 kg/m      GENERAL: Appears comfortable, in no distress .  HEENT: Eye symmetrical, no discharge or icterus bilaterally. Mucous membranes moist and without lesions.  NECK: Supple, JVD >12.   CV: Hum of LVAD, no adventitious sounds  RESPIRATORY: Respirations regular, even, and unlabored. Lungs CTA throughout.    GI: Soft but distended with normoactive bowel sounds present in all quadrants. No tenderness, rebound, guarding.   EXTREMITIES: 2+ b/l peripheral edema. All extremities are warm and well perfused.  NEUROLOGIC: Alert and interacting appropriatly. No focal deficits.   MUSCULOSKELETAL: No joint swelling or tenderness.   SKIN: No jaundice. No rashes or lesions.     Labs:  CMP  Recent Labs   Lab 06/22/23  1138 06/22/23  1129 06/22/23  0756 06/22/23  0515 06/21/23  2202 06/21/23  1740 06/21/23  1124 06/21/23  0911 06/21/23  0728 06/21/23  0608 06/20/23  2141 06/20/23  1942 06/20/23  1550   NA  --   --   --  135*  --  135*  --   --   --  135*  --   --  133*   POTASSIUM 2.9*  --   --  3.1*  3.1*  --  3.1*  --  3.2*  --  3.2*  --  3.6 3.6   CHLORIDE  --   --   --  98  --  99  --   --   --  100  --   --  99   CO2  --   --   --  20*  --  17*  --   --   --  18*  --   --  14*   ANIONGAP  --   --   --  17*  --  19*  --   --   --  17*  --   --  20*   GLC  --  148* 114* 152* 124* 171*   < >  --    < > 140*   < >  --  142*   BUN  --   --   --  99.0*  --  104.0*  --   --   --  100.0*  --   --  101.0*   CR  --   --   --  3.03*  --  3.09*  --   --   --  3.22*  --   --  3.24*   GFRESTIMATED  --   --   --  22*  --  21*  --   --   --  20*  --   --  20*   CALOS  --   --   --  8.2*  --  8.5*  --   --   --  8.2*  --   --  8.2*   MAG  --   --   --  2.2  --  2.0  --    --   --  2.0  --  2.5* 2.0   PROTTOTAL  --   --   --   --   --   --   --   --   --   --   --   --  7.0   ALBUMIN  --   --   --   --   --   --   --   --   --   --   --   --  3.3*   BILITOTAL  --   --   --   --   --   --   --   --   --   --   --   --  0.4   ALKPHOS  --   --   --   --   --   --   --   --   --   --   --   --  135*   AST  --   --   --   --   --   --   --   --   --   --   --   --  46*   ALT  --   --   --   --   --   --   --   --   --   --   --   --  44    < > = values in this interval not displayed.       CBC  Recent Labs   Lab 06/22/23 0515 06/21/23  1525 06/21/23  0911 06/20/23  1550   WBC 9.5  --  10.6 14.7*   RBC 3.06*  --  2.83* 2.86*   HGB 9.0* 8.7* 8.4* 8.5*   HCT 29.9*  --  27.7* 28.0*   MCV 98  --  98 98   MCH 29.4  --  29.7 29.7   MCHC 30.1*  --  30.3* 30.4*   RDW 21.8*  --  21.7* 21.7*     --  175 181       INR  Recent Labs   Lab 06/22/23 0515 06/21/23  0608 06/20/23  1830   INR 2.47* 2.45* 2.55*

## 2023-06-22 NOTE — PROGRESS NOTES
06/22/23 0940   Appointment Info   Signing Clinician's Name / Credentials (OT) Mima Nicole, OTR/L   Living Environment   People in Home spouse;grandchild(arnold)   Current Living Arrangements house   Home Accessibility no concerns  (ramp to enter home)   Transportation Anticipated family or friend will provide   Living Environment Comments Pt lives with wife and 16 y/o granddaughter. Has ramp to enter home with all needs on main level. Bathroom has a tub/shower with shower chair.   Self-Care   Usual Activity Tolerance good   Current Activity Tolerance moderate   Equipment Currently Used at Home shower chair;walker, rolling   Fall history within last six months yes   Number of times patient has fallen within last six months 2   Activity/Exercise/Self-Care Comment Reports Mod I with ADLs/IADLs, uses 4WW for household mobility, showers with LVAD showerbag using shower chair   General Information   Onset of Illness/Injury or Date of Surgery 06/20/23   Referring Physician Sarah Jeffries MD   Additional Occupational Profile Info/Pertinent History of Current Problem Eliseo Tanner is a 67 year old male admitted on 6/20/2023. He has a history of chronic systolic heart failure secondary to NICM (Stage D, NYHA Class IIIA) s/p HM 3 (2/18/2020), moderate CAD, HTN, ABHINAV on CPAP, DM2, CKD Stage III, ANA. 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 He is admitted for volume overload secondary to decompensated heart failure.   Existing Precautions/Restrictions cardiac;fall   Left Upper Extremity (Weight-bearing Status) full weight-bearing (FWB)   Right Upper Extremity (Weight-bearing Status) full weight-bearing (FWB)   Left Lower Extremity (Weight-bearing Status) full weight-bearing (FWB)   Right Lower Extremity (Weight-bearing Status) full  weight-bearing (FWB)   Cognitive Status Examination   Orientation Status orientation to person, place and time   Cognitive Status Comments Pt requiring repetition of questions at times, unable to determine if d/t hearing vs cognition. Per chart review, scored 24/30 on SLUMS on 5/30/23   Range of Motion Comprehensive   General Range of Motion no range of motion deficits identified   Strength Comprehensive (MMT)   Comment, General Manual Muscle Testing (MMT) Assessment Gross 4/5 in BUE   Bed Mobility   Comment (Bed Mobility) IND supine to sit   Transfers   Transfer Comments SBA STS with 4WW   Activities of Daily Living   BADL Assessment/Intervention bathing;lower body dressing;grooming;toileting   Bathing Assessment/Intervention   Comment, (Bathing) Per clinical judgement, SBA   Lower Body Dressing Assessment/Training   Comment, (Lower Body Dressing) SBA to adjust socks while seated on toilet   Grooming Assessment/Training   Comment, (Grooming) Per clinical judgement, SBA standing at sink   Toileting   Comment, (Toileting) SBA toilet transfer with grab bar and 4WW   Clinical Impression   Criteria for Skilled Therapeutic Interventions Met (OT) Yes, treatment indicated   OT Diagnosis Decreased ADL/IADL performance   Influenced by the following impairments Decreased endurance, decreased strength   OT Problem List-Impairments impacting ADL problems related to;activity tolerance impaired;strength   Assessment of Occupational Performance 3-5 Performance Deficits   Planned Therapy Interventions (OT) ADL retraining;balance training;cognition;ROM;strengthening;transfer training;home program guidelines;progressive activity/exercise;risk factor education   Clinical Decision Making Complexity (OT) low complexity   Risk & Benefits of therapy have been explained evaluation/treatment results reviewed;care plan/treatment goals reviewed;risks/benefits reviewed;current/potential barriers reviewed;participants voiced agreement with care  "plan;participants included;patient   OT Total Evaluation Time   OT Eval, Low Complexity Minutes (80143) 7   OT Goals   Therapy Frequency (OT) 5 times/wk   OT Predicted Duration/Target Date for Goal Attainment 07/07/23   OT: Hygiene/Grooming modified independent   OT: Lower Body Dressing Modified independent   OT: Upper Body Bathing Modified independent   OT: Lower Body Bathing Modified independent   OT: Toilet Transfer/Toileting Modified independent   OT: Cognitive Patient/caregiver will verbalize understanding of cognitive assessment results/recommendations as needed for safe discharge planning   Self-Care/Home Management   Self-Care/Home Mgmt/ADL, Compensatory, Meal Prep Minutes (32576) 13   Symptoms Noted During/After Treatment (Meal Preparation/Planning Training) fatigue   Treatment Detail/Skilled Intervention OT: Upon completion of eval, treatment indicated. Performed LB dressing donning shorts with SBA. Pt IND switching from wall LVAD to portable LVAD. Facilitated functional mobility to bathroom with SBA and 4WW. Donned shoes while seated on toilet with SBA and increased time d/t fatigue. Pt reporting no edema in BLE reports \"It was all in my stomach but it has mostly gone away.\"   Therapeutic Activities   Therapeutic Activity Minutes (91610) 14   Symptoms noted during/after treatment fatigue   Treatment Detail/Skilled Intervention OT: Facilitated functional mobility in hallway to progress functional endurance for ADL/IADL routines. Pt ambulated ~250ft with SBA and 4WW in hallway and OT managing IV pole. Pt required 1 seated rest break d/t fatigue. LVAD numbers within parameters t/o session. Pt remained on portable LVAD at end of session.   OT Discharge Planning   OT Plan ADLs standing at sink, tub transfer, functional mobility in hallway   OT Discharge Recommendation (DC Rec) home with assist;home with home care occupational therapy   OT Rationale for DC Rec Pt is currently below baseline function. Recommend " return home at d/c with assist with ADLs/IADLs and HHOT to maximize return to PLOF.   OT Brief overview of current status SBA with 4WW   Total Session Time   Timed Code Treatment Minutes 27   Total Session Time (sum of timed and untimed services) 34

## 2023-06-22 NOTE — PLAN OF CARE
D: Admitted 6/20/23 for volume overload 2/2 decompensated heart failure.      I: Monitored vitals and assessed pt status.   LDA: R SL PICC  Running: Bumex @ 2 mg/hr (8 mL/hr)  PRN: Tylenol x1 for general body soreness  Tele: /SR (HR 90's-100's)  O2: RA awake, CPAP sleep  Mobility: A1 + walker     A: A0x4. [place]. VSS. Afebrile. LVAD numbers WDL, no alarms, doppler MAP's 88-90. MIRANDA. Denied CP, SOB, n/t, dizziness or lightheadedness, or HA. 2g Na diet with 2L FR. ACHS POCT. No nausea. Urinating adequately, voids spontaneously, primofit in place overnight for convenience. No BM this shift, LBM 6/21. Driveline dressing changed this shift. No new skin deficits noted; generalized bruises, 2 arm skin tears, and driveline site. Appeared to sleep well between cares.      P: Continue to monitor Pt status and report changes to Cards 2. Continue diuresis.      Shift of care [phone]

## 2023-06-22 NOTE — PROCEDURES
[facility]    Double Lumen PICC Placement    Date/Time: 6/22/2023 4:13 PM    Performed by: Raf Mclean RN  Authorized by: Mervat Decker PA-C  Indications: vascular access      UNIVERSAL PROTOCOL   Site Marked: Yes  Prior Images Obtained and Reviewed:  Yes  Required items: Required blood products, implants, devices and special equipment available    Patient identity confirmed:  Verbally with patient, arm band, provided demographic data, hospital-assigned identification number and anonymous protocol, patient vented/unresponsive  NA - No sedation, light sedation, or local anesthesia  Confirmation Checklist:  Patient's identity using two indicators, relevant allergies, procedure was appropriate and matched the consent or emergent situation and correct equipment/implants were available  Time out: Immediately prior to the procedure a time out was called (Raf)    Universal Protocol: the Joint Commission Universal Protocol was followed    Preparation: Patient was prepped and draped in usual sterile fashion       ANESTHESIA    Anesthesia: Local infiltration  Local Anesthetic:  Lidocaine 1% without epinephrine  Anesthetic Total (mL):  5      SEDATION    Patient Sedated: No        Preparation: skin prepped with ChloraPrep  Skin prep agent: skin prep agent completely dried prior to procedure  Sterile barriers: maximum sterile barriers were used: cap, mask, sterile gown, sterile gloves, and large sterile sheet  Hand hygiene: hand hygiene performed prior to central venous catheter insertion  Type of line used: PICC  Catheter type: double lumen  Lumen type: non-valved and power PICC  Lumen Identification: Purple and Red  Catheter size: 5 Fr  Brand: Bard  Lot number: [id number]  Placement method: venipuncture, MST, ultrasound and tip navigation system  Number of attempts: 1  Difficulty threading catheter: no  Successful placement: yes  Orientation: right  Catheter to Vein (%):  44  Location: basilic vein (0.41cm)  Tip Location: SVC/RA Junction  Arm circumference: adults 10 cm  Extremity circumference: 27  Visible catheter length: 1  Total catheter length: 42  Dressing and securement: adhesive securement device, alcohol impregnated caps, blood cleaned with CHG, blood removed, fixation device, chlorhexidine patch applied, line secured, securement device, site cleansed, statlock and transparent securement dressing  Post procedure assessment: blood return through all ports, free fluid flow and placement verified by x-ray  PROCEDURE Describe Procedure: PICC rewired right basilic vein 0.41cm dm. 1cm external.Placement verified by CXR.PICC okay to use.  Disposal: sharps and needle count correct at the end of procedure, needles and guidewire disposed in sharps container

## 2023-06-23 NOTE — PLAN OF CARE
D: Admitted 6/20/23 for volume overload 2/2 decompensated heart failure.      I: Monitored vitals and assessed pt status.   LDA: R DL PICC  Running:   -Bumex @ 2 mg/hr (8 mL/hr)  -Dobutamine @ 2 mcg/kg/min (6.4 mL/hr)  Tele: /SR/ST (HR 90's-100's)  O2: RA awake,  Mobility: SBA + walker     A: A0x4. VSS. Doopler MAPs in the 80s. VAD WDL. No alarms. Voiding well on IV bumex. Changed VAD dressing. Walked with therapy today. BM+. Patient has ABHINAV but cannot tolerate hospital CPAP. Family will bring equipment during next visit. Patient pleasant and cooperative with cares.      P: Continue to diurese. Continue to monitor Pt status and report changes to Cards 2. Need daily CardioMem interrogation.

## 2023-06-23 NOTE — PROGRESS NOTES
[facility]   Cardiology II Service / Advanced Heart Failure  Daily Progress Note      Patient: Eliseo Tanner  MRN: [id number]  Admission Date: 6/20/2023  Hospital Day # 3    Assessment and Plan: Eliseo Tanner is a 67 year old male admitted on 6/20/2023. He has a history of chronic systolic heart failure secondary to NICM (Stage D, NYHA Class IIIA) s/p HM 3 (2/18/2020), moderate CAD, HTN, ABHINAV on CPAP, DM2, CKD Stage III, ANA. 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 He is admitted for volume overload secondary to decompensated heart failure.    Today's Plan:  - Bumex gtt @ 2 mg/hr  - q12 hour labs while diuresing, RN potassium protocol PLUS his PTA kcl 80 meq TID  - Trailing Dobutamine 2.5 mcg/kg/min  - Delirium precautions    # Acute on chronic systolic heart failure/HFrEF (EF 10-20%) secondary to NICM  # Acute on chronic RV failure  # s/p HM3 LVAD  Stage D. NYHA Class IIIB.   Echo 4/28/23 septum normal, AV opening with each systole, and LVIDd 5.4 cm. RHC during last admit at 5800 rpm 4/28/23 mRA-12, mPCWP-12, JOSE Co-7, JOSE CI-3.6-note with drop in LVAD speed no significant improvement in RA with increase in wedge. This admission: BNP 9000, weight 197 lbs on admit from 176 lbs on recent discharge 5/31 (EDW ~178 lb). RHC 6/21/23 with RA 15, PA 42/21/26, PCW 16, JOSE CO/CI 5.11/2.57 with speed at 5800  - Volume status: Hypervolemic- bumex gtt @ 2, may need intermittent diuril  - ACE/ARB- contraindicated in the setting of renal dysfunction  - Afterload: Starting hydralazine 25 mg TID  - BB- contraindicated in the setting of RV failure  - Aldosterone Antagonist- contraindicated in the setting of renal dysfunction  - SGLTi- Antagonist- STOPPED PTA Jardiance, contraindicated in the setting of renal  dysfunction  - Inotrope: Trialing dobutamine 2.5 mcg/kg/min - CM assessing insurance coverage  - RV support: None. Attempted cilostazol previously but discontinued due to epistaxis.   - Speed: Increased speed to 5900 after 6/21 RHC  - SCD prophylaxis: ICD; Cardiac device check ordered  -LDH trends: 300s, stable  -Anticoagulation: warfarin (see above), INR goal 1.7-2.3 due to epistaxis, INR is 2.19 today  -Antiplatelet: None  -LVAD: destination therapy, pt with Cirrhosis   - Telemetry, Strict I/Os, daily weights, fluid restriction 2 L, and 2 g Na diet    # NSVT. Had 5 beats of polymorphic VT after starting dobutamine  - CTM on tele     # SIRS criteria  Tachycardia and leukocytosis 14.7 at admission. No signs of infection. Blood culture NG x 12H, UA unremarkable. Leukocytosis now resolved. Low concern for uncontrolled infection. Driveline drainage is much improved from last admission.  - Continue home Cefepime infusion, has R sided PICC line (previously placed)     # BERNARDA vs CKD progression   Cr at 3.24 on arrival, has been stably elevated inpast between 1.9-2.2. Possible component of cardiorenal in the setting of recent decompensated heart failure. UA with microscopy showed 20 protein and small blood.   - Inotropes as above  - Cr 3.13, from 3.03  - Avoid nephrotoxins as possible.   - Will monitor with diuresis.  - Consider renal ultrasound tomorrow if not improving     # Chronic LVAD drive line infection, MSSA and PSA.   Follows with LVAD ID, had recently been switched from cipro and daptomycin to cefepime on 5/16 d/t culture growing quinolone resistant PSA and no MSSA. Infection appears to be well controlled currently - he is afebrile on admit with elevated WBC, and driveline site is without drainage, erythema, or pain on admission.   - Continue IV cefepime (renally dosed 1g q24h)  - Outpatient follow-up with LVAD ID     # Hyponatremia, chronic  Na 133 at admission, suspect hypervolemic hyponatremia in setting of  CHF.   - 137 today, dialy BMP     # Hypothyroidism   - pta levothyroxine 100 mcg qAM      # GERD   - pta pantoprazole 40 mg BID      # Normocytic anemia  # Iron deficiency Anemia   Hgb 8.5 at admission. Baseline 8-9  - pta ferrous sulfate 325 every day      # BPH   - pta finasteride 5 mg every day   - pta tamsulosin 0.8 mg qPM      # T2DM   Most recent A1c 6.3 on 4/17/2023   - LDSSI  - hypoglycemia protocol   - no home medications     # Gout   - Continue Allopurinol 200 g daily (confirmed that no further dose adjustment needed given renal function decreased)     # History of Heparin Induced Thrombocytopenia   - avoid heparin products      # Recently diagnosed cirrhosis with portal hypertension  # Elevated AST, mild  Mildly elevated LFTs, stable. Had evaluation including liver biopsy during last admission which showed cirrhosis - likely multifactorial (remote EtOH misuse, NAFLD, chronic congestive hepatopathy from RV failure). No evidence of decompensation. CT A/P (5/20) with stable small volume ascites and mild mesenteric/retroperitoneal edema/cholelithiasis. Hepatology consulted 5/30, no concern for HE though at risk for. Ensure -2 formed BM daily.         # ABHINAV: CPAP        Diet: Fluid restriction 2000 ML FLUID, 2 Gram Sodium Diet    DVT Prophylaxis: Warfarin  Guevara Catheter: Not present  Cardiac Monitoring: ACTIVE order. Indication: Acute decompensated heart failure (48 hours)  Code Status: Full Code         Mervat Decker PA-C  Advanced Heart Failure/Cardiology II Service  Pager [phone] [facility] [phone]      Patient discussed with Dr. Dutton      45 minutes spent on the date of the encounter doing chart review, history and exam, documentation and further activities per the note    ================================================================    Subjective/24-Hr Events:   Last 24 hr care team notes reviewed. Feeling about the same today. Breathing feels good. Fluid is going down. Still has fluid in his  abdomen. No lightheadedness or dizziness. No nause or vomiting. No bleeding symptoms. Driveling drainage is controled/minimal. No fevers or chills. No confusion this admission.      ROS:  4 point ROS including respiratory, CV, GI and  (other than that noted in the HPI) is negative.     Medications: Reviewed in EPIC.     Physical Exam:   BP 90/67 (BP Location: Left arm)   Pulse 110   Temp 97.9  F (36.6  C) (Oral)   Resp 16   Wt 84.1 kg (185 lb 6.4 oz)   SpO2 98%   BMI 29.04 kg/m      GENERAL: Appears comfortable, in no distress .  HEENT: Eye symmetrical, no discharge or icterus bilaterally. Mucous membranes moist and without lesions.  NECK: Supple, JVD >12.   CV: Hum of LVAD, no adventitious sounds  RESPIRATORY: Respirations regular, even, and unlabored. Lungs CTA throughout.    GI: Soft but distended with normoactive bowel sounds present in all quadrants. No tenderness, rebound, guarding.   EXTREMITIES: 1+ b/l peripheral edema. All extremities are warm and well perfused.  NEUROLOGIC: Alert and interacting appropriatly. No focal deficits.   MUSCULOSKELETAL: No joint swelling or tenderness.   SKIN: No jaundice. No rashes or lesions.     Labs:  CMP  Recent Labs   Lab 06/23/23  1202 06/23/23  0544 06/22/23  2140 06/22/23  2032 06/22/23  1726 06/22/23  1554 06/22/23  1138 06/22/23  0756 06/22/23  0515 06/21/23  2202 06/21/23  1740 06/20/23  1942 06/20/23  1550   NA  --  137  --  135*  --  136  --   --  135*  --  135*   < > 133*   POTASSIUM  --  4.0  --  3.7  --  3.4  3.4 2.9*  --  3.1*  3.1*  --  3.1*   < > 3.6   CHLORIDE  --  101  --  99  --  99  --   --  98  --  99   < > 99   CO2  --  21*  --  20*  --  19*  --   --  20*  --  17*   < > 14*   ANIONGAP  --  15  --  16*  --  18*  --   --  17*  --  19*   < > 20*   * 129* 181* 209*   < > 204*  --    < > 152*   < > 171*   < > 142*   BUN  --  87.3*  --  89.6*  --  91.0*  --   --  99.0*  --  104.0*   < > 101.0*   CR  --  3.13*  --  3.27*  --  3.07*  --   --   3.03*  --  3.09*   < > 3.24*   GFRESTIMATED  --  21*  --  20*  --  21*  --   --  22*  --  21*   < > 20*   CALOS  --  8.0*  --  7.7*  --  8.2*  --   --  8.2*  --  8.5*   < > 8.2*   MAG  --  1.6*  --   --   --  1.8  --   --  2.2  --  2.0   < > 2.0   PROTTOTAL  --   --   --   --   --   --   --   --   --   --   --   --  7.0   ALBUMIN  --   --   --   --   --   --   --   --   --   --   --   --  3.3*   BILITOTAL  --   --   --   --   --   --   --   --   --   --   --   --  0.4   ALKPHOS  --   --   --   --   --   --   --   --   --   --   --   --  135*   AST  --   --   --   --   --   --   --   --   --   --   --   --  46*   ALT  --   --   --   --   --   --   --   --   --   --   --   --  44    < > = values in this interval not displayed.       CBC  Recent Labs   Lab 06/23/23 0544 06/22/23 0515 06/21/23  1525 06/21/23  0911 06/20/23  1550   WBC 10.0 9.5  --  10.6 14.7*   RBC 3.03* 3.06*  --  2.83* 2.86*   HGB 9.1* 9.0* 8.7* 8.4* 8.5*   HCT 29.1* 29.9*  --  27.7* 28.0*   MCV 96 98  --  98 98   MCH 30.0 29.4  --  29.7 29.7   MCHC 31.3* 30.1*  --  30.3* 30.4*   RDW 21.6* 21.8*  --  21.7* 21.7*    201  --  175 181       INR  Recent Labs   Lab 06/23/23  0544 06/22/23  0515 06/21/23  0608 06/20/23  1830   INR 2.19* 2.47* 2.45* 2.55*

## 2023-06-23 NOTE — PROGRESS NOTES
Care Management Follow Up    Length of Stay (days): 3    Expected Discharge Date: 06/28/2023     Concerns to be Addressed:       Patient plan of care discussed at interdisciplinary rounds: Yes    Anticipated Discharge Disposition:  Home     Anticipated Discharge Services:  Outpt for infusions, outpt for PT/OT, inotrope infusion  Anticipated Discharge DME:  Inotrope infusion supplies    Patient/family educated on Medicare website which has current facility and service quality ratings:  yes  Education Provided on the Discharge Plan:  yes  Patient/Family in Agreement with the Plan:  yes    Referrals Placed by CM/SW:  Coordination of outpt services and inotrope benefit check  Private pay costs discussed: TBD    Additional Information:  Per team, pt is being trialed on inotrope for potential benefit. Writer reached out to  home infusion for benefit check.    OT/PT recs for home care or oupt PT/OT. Per pt, preference would be for oupt through [facility]. Pt currently goes to the [facility] daily for IV antibiotics infusions. Writer reached out to [facility] to coordinate outpt therapies and [facility] to resume daily antibiotic infusions. Per team, pt likely med ready Monday 6/26.     Contacts  [facility] - Benefit check submitted for possible IV inotropes at discharge  Phone: [phone]; Fax: [phone]    [facility] (for daily IV abx infusions) please call with discharge update and fax orders  Ph [phone]  Fax [phone]    [facility] (for outpt PT/OT) please fax orders upon discharge and call with update  Ph [phone]  Fax [phone]    Estrada Nelson RNCC  Covering for 6C  Phone [phone]    SEARCHABLE in [employer] - search CARE COORDINATOR    [place] [employer] [place] ([phone]) Saturday & Sunday; ([phone]) [place] Recognized Holidays    Units: 4A, 4C, 4E, 5A  & 5B   Pager: [phone]    Units: 6A & 6B    Pager: [phone]    Units: 6C & 6D   Pager: [phone]    Units: 7A, 7B, 7C, 7D & 5C    Pager: [phone]    Units: [facility] ED, 5 Ortho, 5 Med/Surg, 6 Med/Surg, 8A & 10 ICU

## 2023-06-23 NOTE — PROGRESS NOTES
"CLINICAL NUTRITION SERVICES    Reason for Assessment:  Low-sodium (2 g/day) nutrition education, received consult    Diet History:  Per RD note 5/20/2023, \" ED consult:Congestive Heart Failure (CHF) - Dietitian to instruct patient on 2 gram sodium diet.  Pt busy with test in the room. Wife at bedside with daughter. Per spouse, patient keep a close watch on sodium and fluid intake at home. They avoid processed foods and read label at all time. Nutrition Education. 1. Provided quick review on low-sodium meal planning. 2. Provided the following handouts: How to Read Nutrition Labels, Low-Sodium Foods and Drinks, Tips for a Low-Sodium Diet, Seasoning Your Foods Without Adding Salt, and Managing Fluid Restriction.\"      Per discussion with pt 6/23, reports receiving low-sodium nutrition education in the past.    Nutrition Diagnosis:  No nutrition education dx    Nutrition Prescription/Recs:  Continue low-sodium diet. Continue fluid restriction.     Interventions:  Nutrition Education: Offered nutrition education. Pt politely declined as pt has received nutrition education in the past. Explained current diet order, fluid restriction, and room service allocations. Modify composition of meals/snacks: Placed order to allow pt to go over meal restrictions as long as not going over daily restrictions.     Goals:    Pt will verbalize at least five high sodium foods and the importance of avoiding added salt to foods for cooking or seasoning foods.     Follow-up:   Patient to ask any further nutrition-related questions before discharge. In addition, pt may request outpatient RD appointment.     Abby Woods, MS, RD, LD, [facility]   6C/5A(beds 5201 - 5207) RD pgr: [phone]     "

## 2023-06-23 NOTE — PLAN OF CARE
D: Admitted 6/20/23 for volume overload 2/2 decompensated heart failure.      I: Monitored vitals and assessed pt status.   LDA: R DL PICC  Running:   -Bumex @ 2 mg/hr (8 mL/hr)  -Dobutamine @ 2 mcg/kg/min (6.4 mL/hr)  Tele: /SR/ST (HR 90's-100's)  O2: RA awake, CPAP or 2L NC sleep  Mobility: A1 + walker     A: A0x4. [place]. VSS. Afebrile. LVAD numbers WDL, no alarms, doppler MAP's 90-94. MIRANDA. Denied pain, CP, SOB, n/t, dizziness or lightheadedness, or HA. 2g Na diet with 2L FR. ACHS POCT. No nausea. Urinating adequately, voids spontaneously, primofit in place overnight for convenience. No BM this shift, LBM 6/22. Driveline dressing CDI, daily changes. No new skin deficits noted; generalized bruises, arm skin tears, and driveline site. Appeared to sleep well between cares.       K replaced per protocol.      P: Continue to monitor Pt status and report changes to Cards 2.      Shift of care [id number]

## 2023-06-24 NOTE — PROCEDURES
The patient's HeartMate LVAD was interrogated 6/24/2023  * Speed 5900 rpm   * Pulsatility index 3.3-3.8   * Power 4.9-5.1 Driver   * Flow 5.1-5.2 L/minute   Fluid status: hypervolemic   Alarms were reviewed, with no LVAD alarms or signs of pump malfunction.   The driveline exit site was covered, with dressing c/d/i.   All external components were inspected and showed no evidence of damage or malfunction, none replaced.   No changes to VAD settings made.      Aracelis Rose DNP, [facility]-BC  Advanced Heart Failure Nurse Practitioner  [facility]

## 2023-06-24 NOTE — PROGRESS NOTES
Care Coordinator  D/I: Per previous RN CC: they sent benefit check for home inotrope to [facility] 6/23/23.  P: I sent an email inquiry to [facility] benefits staff today and per response:  Hello,    I am working for June today.  Edvin is still working on the benefits for this patient.  I would think that she will have a benefit coverage on Monday.    Thank you,    Lucina Calixto (AJ)? Intake/   [facility]   [facility]   [address] [place] [id number]    [email or web address]? 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  Will follow.

## 2023-06-24 NOTE — PLAN OF CARE
Shift: [id number]  D: Admitted 6/20 for BERNARDA and HF exacerbation.      I/A:  Neuro: A&O x4  Cardiac: Tele in place, ,ST, -120's. Doppler MAPs 78-80's. CardioMem in flow sheet. Afebrile. Denies chest pain. No LVAD alarms, numbers WNL. LVAD dressing changed - no drainage present.   Resp: VSS on RA. Slight MIRANDA, no SOB. Lung sounds clear.  GI/: Adequate urine output. 2 BM's. 2g Na diet, 2L FR.  Skin: No new deficits noted  LDA's: DL PICC in place infusing Dobutamine at 2.5 mcg/kg/min and Bumex at 2 mg/hr. LVAD driveline - CDI  Pain: Denies        Plan: Continue to monitor and follow POC. Waiting for authorization to go home on Dobutamine. Potentially discharge Tuesday 6/27. Notify Cards 2 with any changes.

## 2023-06-24 NOTE — PROGRESS NOTES
[facility]   Cardiology II Service / Advanced Heart Failure  Daily Progress Note  Date of Service: 6/24/2023      Patient: Eliseo Tanner  MRN: [id number]  Admission Date: 6/20/2023  Hospital Day # 4    Assessment and Plan:  Mr. Eliseo Tanner is a 67yr old male with a history of chronic systolic heart failure secondary to NICM (LVEF 15-20% per TTE 9/2022), s/p HM3 LVAD (2/18/2020, DT) c/b recurrent/chronic drive-line infections, moderate CAD, ABHINAV (on CPAP), DMII, HTN, CKD-III, ANA, and newly diagnosed cirrhosis who was admitted due to progressive shortness of breath and refractory fluid overload.      PLAN:  - looking in to coverage for outpatient dobutamine therapy  - continue diuresis      Acute on chronic biventricular systolic heart failure secondary to NICM (LVEF 15-20% per TTE 9/2022)  Stage D, NYHA Class III  TTE 4/28/23:  septum normal, AV opening with each systole, and LVIDd 5.4cm.  RHC 4/28/23:  RA 12, mPA 23. PCW 12, CI 3.6 -- when LVAD speed was dropped, RA did not improve but PCW increased.    He has had multiple hospitalizations for decompensated heart failure.  This admission, NTproBNP was 9K, weight 197# (was 176# at last discharge 5/31/23, EDS ~178#).    RHC 6/21/23:  RA 15, mPA 26, PCW 16, and CI 2.57 at LVAD speed 5800rpm.  VAD speed subsequently increased to 5900rpm.    - ACEi/ARB/ARNi:  Deferred due to renal dysfunction  - Afterload reduction:  Hydralazine 25mg TID  - BB:  Deferred due to RV dysfunction  - Aldosterone antagonist:  Deferred due to renal dysfunction  - SGLT2i:  STOPPED PTA Jardiance due to renal dysfunction  - RV support/inotropy:  Dobutamine @ 2.5mcg/kg/min, RNCC working on potential outpatient coverage.  Have trialed cilostazol, but stopped due to epistaxis.  - SCD ppx:  ICD  - Fluid status:  Hypervolemic, with improved renal function and weight since starting dobutamine and bumex infusion.  Continue bumex @ 2mg/hr.    s/p HM3 LVAD (2/18/20, DT)  - antiplatelet:   Not on PTA, possibly due to past epistaxis.  Defer re-trial'ing to outpatient team.  - anticoagulation:  Warfarin, goal INR 1.7-2.3 (due to epistaxis).  INR today 2.22.  - MAPs:  70-80s  - LDH:  401 (6/24), trend weekly    Chronic LVAD drive line infection, MSSA and PSA  Follows with LVAD ID, had recently been switched from cipro and daptomycin to cefepime on 5/16 d/t culture growing quinolone resistant PSA and no MSSA.  Infection appears to be well-controlled currently - he is afebrile on admit with elevated WBC, and driveline site is without drainage, erythema, or pain on admission.   - Continue IV cefepime (renally dosed 1g q24h)  - Outpatient follow-up with LVAD ID    SIRS criteria  On admission, was tachycardic and found to have leukocytosis (WBC 14.7).  No signs of infection (BG NGTD, UA unremarkable).  Leukocytosis resolved.  Low concern for uncontrolled infection.  Driveline drainage has improved since last hospitalization.  - continue home Cefepime infusion, has R sided PICC line (previously placed)  - follows with ID    BERNARDA vs CKD progression   Cr was 3.24 on arrival, has been previously stable at 1.9-2.2.  BERNARDA possibly secondary to CRS/hypervolemia.  UA with microscopy showed 20 protein and small blood.  Creat has improved with diuresis  - dobutamine, as above  - avoid nephrotoxins as possible.   - diuresis, as above  - continue to monitor  - will consider renal ultrasound if creat does not continue to improve    NSVT  Noted to have 5 beats of polymorphic VT after starting dobutamine.  - continue to monitor per tele  - continue amiodarone 200mg daily    Hyponatremia, chronic  Na 133 on admission, suspect hypervolemic hyponatremia in setting of CHF.   - diuresis, as above  - continue to monitor    Recently diagnosed cirrhosis with portal hypertension  Elevated AST, mild  Mildly elevated LFTs, stable.  Liver biopsy during revealed cirrhosis - likely multifactorial (remote EtOH misuse, NAFLD, chronic  congestive hepatopathy from RV failure).  No evidence of decompensation. CT A/P (5/20) showed stable small volume ascites and mild mesenteric/retroperitoneal edema/cholelithiasis. Hepatology consulted 5/30, no concern for HE though at risk for.  Ensure that he is passing 2 formed BMs daily.       OTHER/CHRONIC CONDITIONS:   Hypothyroidism:  Last T4 was 0.97 on 5/21/23, continue PTA levothyroxine 100mcg qAM.  GERD:  Continue PTA pantoprazole 40mg BID   Normocytic anemia/BELA:  Baseline 8-9, remains stable.  Continue PTA ferrous sulfate 325mg daily.   BPH:  Continue PTA finasteride 5mg daily and tamsulosin 0.8mg qPM.  DMII:  Most recent A1c 6.3 on 4/17/2023, continue LDSSI, hypoglycemia protocol.  Not on treatment PTA.  Gout:  Continue Allopurinol 200mg daily (confirmed that no further dose adjustment needed given renal function decreased)  h/o HIT:  Avoid heparin products.  ABHINAV:  Continue CPAP.      Diet:  Fluid restriction 2000mL FLUID, 2g Sodium Diet    DVT Prophylaxis: Warfarin, ambulate  Guevara Catheter: Not present  Cardiac Monitoring: ACTIVE order. Indication: Acute decompensated heart failure (48 hours)  Code Status: Full Code    =============================================================    Interval History/ROS:   Mr. Tanner states that he feels better.  He continues to note fluid in his abdomen, which has improved.  His breathing remains well, and he denies SOB, PND, and orthopnea.  He has an appetite, and is eating without nausea, vomiting, diarrhea, and constipation.  He otherwise denies chest pain, palpitations, dizziness, headaches, acute vision changes, fevers, chills, cough, sore throat, and signs of bleeding.    Last 24 hr care team notes reviewed.   ROS:  4 point ROS including Respiratory, CV, GI and , other than that noted in the HPI, is negative.     Medications: Reviewed in EPIC.     Physical Exam:   BP 91/76 (BP Location: Left arm)   Pulse 111   Temp 97.4  F (36.3  C) (Oral)   Resp 16   Wt  83.2 kg (183 lb 8 oz)   SpO2 96%   BMI 28.74 kg/m    GENERAL: Appears alert and oriented times three.   HEENT: Eye symmetrical and free of discharge bilaterally. Mucous membranes moist and without lesions.  NECK: Supple and without lymphadenopathy. JVD at jaw when lying at 45 .   CV: +mechanical LVAD hum.   RESPIRATORY: Respirations regular, even, and unlabored. Lungs CTA throughout.   GI: Soft and full with normoactive bowel sounds present in all quadrants. No tenderness, rebound, guarding. No organomegaly.   EXTREMITIES: No peripheral edema. 2+ bilateral pedal pulses.   NEUROLOGIC: Alert and orientated x 3. CN II-XII grossly intact. No focal deficits.   MUSCULOSKELETAL: No joint swelling or tenderness.   SKIN: No jaundice. No rashes or lesions. Driveline covered, dressing c/d/i.    Data:  CMPRecent Labs   Lab 06/24/23  0756 06/24/23  0536 06/23/23  2301 06/23/23  1934 06/23/23  1202 06/23/23  0544 06/22/23  2140 06/22/23  2032 06/22/23  1726 06/22/23  1554 06/20/23  1942 06/20/23  1550   NA  --  142  --  140  --  137  --  135*  --  136   < > 133*   POTASSIUM  --  3.9  --  4.4  --  4.0  --  3.7  --  3.4  3.4   < > 3.6   CHLORIDE  --  107  --  105  --  101  --  99  --  99   < > 99   CO2  --  20*  --  20*  --  21*  --  20*  --  19*   < > 14*   ANIONGAP  --  15  --  15  --  15  --  16*  --  18*   < > 20*   * 141* 158* 166*   < > 129*   < > 209*   < > 204*   < > 142*   BUN  --  79.0*  --  88.0*  --  87.3*  --  89.6*  --  91.0*   < > 101.0*   CR  --  2.92*  --  3.02*  --  3.13*  --  3.27*  --  3.07*   < > 3.24*   GFRESTIMATED  --  23*  --  22*  --  21*  --  20*  --  21*   < > 20*   CALOS  --  8.2*  --  8.3*  --  8.0*  --  7.7*  --  8.2*   < > 8.2*   MAG  --  2.4*  --  1.6*  --  1.6*  --   --   --  1.8   < > 2.0   PROTTOTAL  --   --   --   --   --   --   --   --   --   --   --  7.0   ALBUMIN  --   --   --   --   --   --   --   --   --   --   --  3.3*   BILITOTAL  --   --   --   --   --   --   --   --   --   --    --  0.4   ALKPHOS  --   --   --   --   --   --   --   --   --   --   --  135*   AST  --   --   --   --   --   --   --   --   --   --   --  46*   ALT  --   --   --   --   --   --   --   --   --   --   --  44    < > = values in this interval not displayed.     CBC  Recent Labs   Lab 06/24/23  0536 06/23/23  0544 06/22/23  0515 06/21/23  1525 06/21/23  0911   WBC 10.9 10.0 9.5  --  10.6   RBC 3.01* 3.03* 3.06*  --  2.83*   HGB 9.1* 9.1* 9.0* 8.7* 8.4*   HCT 29.6* 29.1* 29.9*  --  27.7*   MCV 98 96 98  --  98   MCH 30.2 30.0 29.4  --  29.7   MCHC 30.7* 31.3* 30.1*  --  30.3*   RDW 22.2* 21.6* 21.8*  --  21.7*    215 201  --  175     INR  Recent Labs   Lab 06/24/23  0536 06/23/23  0544 06/22/23  0515 06/21/23  0608   INR 2.22* 2.19* 2.47* 2.45*       Patient discussed with Dr. Dutton.      Aracelis Rose, CORY, [facility]-BC  Advanced Heart Failure Nurse Practitioner  [facility]

## 2023-06-24 NOTE — PLAN OF CARE
Admitted for volume overload secondary to decompensated heart failure. PMHx of chronic systolic heart failure secondary to NICM (Stage D, NYHA Class IIIA) s/p HM 3 (2/18/2020), moderate CAD, HTN, ABHINAV on CPAP, DM2, CKD Stage III, ANA. 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       Code status:  Full Code   Team: Cards 2     Neuro: AOx4, Calm and cooperative  Cardiac/Tele/VS: SR with aberrant conduction. 's. LVAD numbers WDL. Doppler MAPs WNL. No chest pain or palpitations. Other VSS  Respiratory: Room air. Occasionally on 1L NC. O2 sats > 92%. MIRANDA noted  GI/: Voids spontaneously via the urinal. BM regular  Diet/Appetite: 2g Na diet with 2L FR  Skin: Extensively bruised arms and forearms. Skin tears on the elbows with transparent film.  Endocrine/Electrolytes: BG checks ACHS. Potassium replaced with evening med pass. Replace lytes per protocols  LDAs: Double lumen PICC with Dobutamine running at 2.5 mcg/kg/min and Bumex at 2 mg/hr  Activity: Up assist of 1   Pain: Denies pain     Plan: Continue POC and notify team of concerns

## 2023-06-25 NOTE — PROCEDURES
The patient's HeartMate LVAD was interrogated 6/25/2023  * Speed 5900 rpm   * Pulsatility index 3.3-3.7   * Power 4.9 Driver   * Flow 5.1-5.3 L/minute   Fluid status: hypervolemic   Alarms were reviewed, with no LVAD alarms or signs of pump malfunction.   The driveline exit site was covered, with dressing c/d/i.   All external components were inspected and showed no evidence of damage or malfunction, none replaced.   No changes to VAD settings made.      Aracelis Rose DNP, [facility]-BC  Advanced Heart Failure Nurse Practitioner  [facility]

## 2023-06-25 NOTE — PLAN OF CARE
Hours of Care:  1900 - 0700     Temp:  [97.4  F (36.3  C)-98  F (36.7  C)] 97.9  F (36.6  C)  Pulse:  [102-111] 106  Resp:  [16-18] 16  BP: ()/(76-81) 112/81  SpO2:  [90 %-96 %] 93 %        D: 68 yo male admitted 6/20 for BERNARDA and hypotension. History of Systolic HF, HM3 (2020), CAD, HTN, ABHINAV on home CPAP overnight, DM2, CKD 3, ANA, HM3 complicated by retrosternal hematoma and bleeding into lungs, and chronic MRSA drive line infection.      Neuro: A/O x 4.  Call light appropriate.  Able to make needs known.  Respiratory:  On room air.  Lung sounds clear.  Denies shortness of breath at rest.  Cardiac: LVAD numbers WNL, sinus, Maps 80-90s  GI: Last BM 4/26.  No report of nausea or vomiting. Hold bowel meds for loose stools.  : Urinating adequate amounts of clear, yellow urine  Skin:  See PCS for assessment and treatment of wounds and surgical incisions.  LDA:  Double lumen PICC dobutamine running 2.5mcg/kg/min and bumex at 2mg/hr  Pain: Denies pain  Tests/procedures: None performed overnight.  Diet: 2 g Na.  2000 mL fluid restriction     P: Continue to monitor Pt status and report changes to Cards 2. Plan for discharge 6/27.

## 2023-06-25 NOTE — PROGRESS NOTES
[facility]   Cardiology II Service / Advanced Heart Failure  Daily Progress Note  Date of Service: 6/25/2023      Patient: Eliseo Tanner  MRN: [id number]  Admission Date: 6/20/2023  Hospital Day # 5    Assessment and Plan:  Mr. Eliseo Tanner is a 67yr old male with a history of chronic systolic heart failure secondary to NICM (LVEF 15-20% per TTE 9/2022), s/p HM3 LVAD (2/18/2020, DT) c/b recurrent/chronic drive-line infections, moderate CAD, ABHINAV (on CPAP), DMII, HTN, CKD-III, ANA, and newly diagnosed cirrhosis who was admitted due to progressive shortness of breath and refractory fluid overload.      PLAN:  - looking in to coverage for outpatient dobutamine therapy  - continue diuresis -- adding diuril 500mg IV x 1 today  - trend BMP      Acute on chronic biventricular systolic heart failure secondary to NICM (LVEF 15-20% per TTE 9/2022)  Stage D, NYHA Class III  TTE 4/28/23:  septum normal, AV opening with each systole, and LVIDd 5.4cm.  RHC 4/28/23:  RA 12, mPA 23. PCW 12, CI 3.6 -- when LVAD speed was dropped, RA did not improve but PCW increased.    He has had multiple hospitalizations for decompensated heart failure.  This admission, NTproBNP was 9K, weight 197# (was 176# at last discharge 5/31/23, EDS ~178#).    RHC 6/21/23:  RA 15, mPA 26, PCW 16, and CI 2.57 at LVAD speed 5800rpm.  VAD speed subsequently increased to 5900rpm.    - ACEi/ARB/ARNi:  Deferred due to renal dysfunction  - Afterload reduction:  Hydralazine 25mg TID  - BB:  Deferred due to RV dysfunction  - Aldosterone antagonist:  Deferred due to renal dysfunction  - SGLT2i:  STOPPED PTA Jardiance due to renal dysfunction  - RV support/inotropy:  Dobutamine @ 2.5mcg/kg/min, RNCC working on potential outpatient coverage.  Have trialed cilostazol, but stopped due to epistaxis.  - SCD ppx:  ICD  - Fluid status:  Hypervolemic, weight up from yesterday to today, will continue bumex @ 2mg/hr and giving diuril 500mg IV x 1 today    s/p HM3  LVAD (2/18/20, DT)  - antiplatelet:  Not on PTA, possibly due to past epistaxis.  Defer re-trial'ing to outpatient team.  - anticoagulation:  Warfarin, goal INR 1.7-2.3 (due to epistaxis).  INR today 2.09.  - MAPs:  70-80s  - LDH:  401 (6/24), trend weekly    Chronic LVAD drive line infection, MSSA and PSA  Follows with LVAD ID, had recently been switched from cipro and daptomycin to cefepime on 5/16 d/t culture growing quinolone resistant PSA and no MSSA.  Infection appears to be well-controlled currently - he is afebrile on admit with elevated WBC, and driveline site is without drainage, erythema, or pain on admission.   - Continue IV cefepime (renally dosed 1g q24h)  - Outpatient follow-up with LVAD ID    SIRS criteria  On admission, was tachycardic and found to have leukocytosis (WBC 14.7).  No signs of infection (BG NGTD, UA unremarkable).  Leukocytosis resolved.  Low concern for uncontrolled infection.  Driveline drainage has improved since last hospitalization.  - continue home Cefepime infusion, has R sided PICC line (previously placed)  - follows with ID    BERNARDA vs CKD progression   Cr was 3.24 on arrival, has been previously stable at 1.9-2.2.  BERNARDA possibly secondary to CRS/hypervolemia.  UA with microscopy showed 20 protein and small blood.  Creat has improved with diuresis  - dobutamine, as above  - avoid nephrotoxins as possible.   - diuresis, as above  - continue to monitor  - will consider renal ultrasound if creat does not continue to improve    NSVT  Noted to have 5 beats of polymorphic VT after starting dobutamine.  - continue to monitor per tele  - continue amiodarone 200mg daily    Hyponatremia, chronic  Na 133 on admission, suspect hypervolemic hyponatremia in setting of CHF.   - diuresis, as above  - continue to monitor    Recently diagnosed cirrhosis with portal hypertension  Elevated AST, mild  Mildly elevated LFTs, stable.  Liver biopsy during revealed cirrhosis - likely multifactorial  (remote EtOH misuse, NAFLD, chronic congestive hepatopathy from RV failure).  No evidence of decompensation. CT A/P (5/20) showed stable small volume ascites and mild mesenteric/retroperitoneal edema/cholelithiasis. Hepatology consulted 5/30, no concern for HE though at risk for.  Ensure that he is passing 2 formed BMs daily.       OTHER/CHRONIC CONDITIONS:   Hypothyroidism:  Last T4 was 0.97 on 5/21/23, continue PTA levothyroxine 100mcg qAM.  GERD:  Continue PTA pantoprazole 40mg BID   Normocytic anemia/BELA:  Baseline 8-9, remains stable.  Continue PTA ferrous sulfate 325mg daily.   BPH:  Continue PTA finasteride 5mg daily and tamsulosin 0.8mg qPM.  DMII:  Most recent A1c 6.3 on 4/17/2023, continue LDSSI, hypoglycemia protocol.  Not on treatment PTA.  Gout:  Continue Allopurinol 200mg daily (confirmed that no further dose adjustment needed given renal function decreased)  h/o HIT:  Avoid heparin products.  ABHINAV:  Continue CPAP.      Diet:  Fluid restriction 2000mL FLUID, 2g Sodium Diet    DVT Prophylaxis: Warfarin, ambulate  Guevara Catheter: Not present  Cardiac Monitoring: ACTIVE order. Indication: Acute decompensated heart failure (48 hours)  Code Status: Full Code    =============================================================    Interval History/ROS:   Mr. Tanner states that he feels well today.  He does not feel much fluid, and was surprised with his weight gain overnight.  His breathing remains well, and he denies SOB, PND, and orthopnea.  His appetite remains well, and he is eating well without nausea, vomiting, diarrhea, and constipation.  He otherwise denies chest pain, palpitations, dizziness, falls, fevers, chills, cough, sore throat, and signs of bleeding.    Last 24 hr care team notes reviewed.   ROS:  4 point ROS including Respiratory, CV, GI and , other than that noted in the HPI, is negative.     Medications: Reviewed in EPIC.     Physical Exam:   /81 (BP Location: Left arm)   Pulse 108    Temp 98.1  F (36.7  C) (Oral)   Resp 16   Wt 84.1 kg (185 lb 4.8 oz)   SpO2 93%   BMI 29.02 kg/m    GENERAL: Appears alert and oriented times three.   HEENT: Eye symmetrical and free of discharge bilaterally. Mucous membranes moist and without lesions.  NECK: Supple and without lymphadenopathy. JVD at jaw when lying at 45 .   CV: +mechanical LVAD hum.   RESPIRATORY: Respirations regular, even, and unlabored. Lungs CTA throughout.   GI: Soft and full with normoactive bowel sounds present in all quadrants. No tenderness, rebound, guarding. No organomegaly.   EXTREMITIES: No peripheral edema. 2+ bilateral pedal pulses.   NEUROLOGIC: Alert and orientated x 3. CN II-XII grossly intact. No focal deficits.   MUSCULOSKELETAL: No joint swelling or tenderness.   SKIN: No jaundice. No rashes or lesions. Driveline covered, dressing c/d/i.    Data:  CMP  Recent Labs   Lab 06/25/23  0537 06/25/23  0530 06/24/23  2127 06/24/23  2123 06/24/23  0756 06/24/23  0536 06/23/23  2301 06/23/23  1934 06/23/23  1202 06/23/23  0544 06/20/23  1942 06/20/23  1550   NA  --  136  --  139  --  142  --  140  --  137   < > 133*   POTASSIUM  --  4.2  --  4.9  --  3.9  --  4.4  --  4.0   < > 3.6   CHLORIDE  --  105  --  108*  --  107  --  105  --  101   < > 99   CO2  --  18*  --  17*  --  20*  --  20*  --  21*   < > 14*   ANIONGAP  --  13  --  14  --  15  --  15  --  15   < > 20*   * 128* 124* 128*   < > 141*   < > 166*   < > 129*   < > 142*   BUN  --  77.2*  --  75.8*  --  79.0*  --  88.0*  --  87.3*   < > 101.0*   CR  --  2.82*  --  2.69*  --  2.92*  --  3.02*  --  3.13*   < > 3.24*   GFRESTIMATED  --  23*  --  25*  --  23*  --  22*  --  21*   < > 20*   CALOS  --  8.5*  --  8.0*  --  8.2*  --  8.3*  --  8.0*   < > 8.2*   MAG  --  1.8  --   --   --  2.4*  --  1.6*  --  1.6*   < > 2.0   PROTTOTAL  --   --   --   --   --   --   --   --   --   --   --  7.0   ALBUMIN  --   --   --   --   --   --   --   --   --   --   --  3.3*   BILITOTAL  --    --   --   --   --   --   --   --   --   --   --  0.4   ALKPHOS  --   --   --   --   --   --   --   --   --   --   --  135*   AST  --   --   --   --   --   --   --   --   --   --   --  46*   ALT  --   --   --   --   --   --   --   --   --   --   --  44    < > = values in this interval not displayed.     CBC  Recent Labs   Lab 06/24/23  0536 06/23/23  0544 06/22/23  0515 06/21/23  1525 06/21/23  0911   WBC 10.9 10.0 9.5  --  10.6   RBC 3.01* 3.03* 3.06*  --  2.83*   HGB 9.1* 9.1* 9.0* 8.7* 8.4*   HCT 29.6* 29.1* 29.9*  --  27.7*   MCV 98 96 98  --  98   MCH 30.2 30.0 29.4  --  29.7   MCHC 30.7* 31.3* 30.1*  --  30.3*   RDW 22.2* 21.6* 21.8*  --  21.7*    215 201  --  175     INR  Recent Labs   Lab 06/25/23  0530 06/24/23  0536 06/23/23  0544 06/22/23  0515   INR 2.09* 2.22* 2.19* 2.47*       Patient discussed with Dr. Dutton.      Aracelis Rose, CORY, FN-BC  Advanced Heart Failure Nurse Practitioner  [facility]

## 2023-06-25 NOTE — PROGRESS NOTES
D: Pt admitted due to progressive shortness of breath and refractory fluid overload.     I/A:   Neuro: A&O x4.   Cardiac: ST, HR 100s. MAPs 74-90. LVAD numbers WDL, no alarms or events.   *One time dose of IV Diuril given   Respiratory: Sats >90% on RA, denied SOB.  GI/: Adequate UOP. BM x3 today, watery. Sample sent down, results pending.   Skin/drains: Dressing changed to driveline site, site in tact without drainage.   IV/Drips: 2 lumen PICC in RUE: Dobutamine gtt running at 2.5mcg/mkg/min and Bumex gtt running at 2mg/hr.   Activity: Stand by assist with walker, in bed for a majority of the shift.        P: Continue to diurese. Notifying Cards 2 team of changes.

## 2023-06-26 NOTE — PROGRESS NOTES
D: Stopped by to see patient.   I: Discussed POC and provided support and listened to patient's thoughts and concerns.  Started discussions on follow up needed if Dobutamine in place at discharge.  Explained that in person appt's would be required more frequently that prior to admission.  Pt had appropriate questions regarding the equipment and training needed for this infusion.  Explained that he and Veronique would be fully educated prior to leaving the hospital.  Pt verbalized the desire to leave as soon as possible.  Team aware.  P: Continue to follow patient and address any questions or concerns patient and or caregiver may have.

## 2023-06-26 NOTE — PROGRESS NOTES
Therapy: Inotrope/Dobutamine  Insurance: Medicare/BCBS Supplement    Patient meets Medicare criteria for Dobutamine therapy, between Medicare and BCBS supplement patient is covered 100%.    [facility] in reference to admission date 06/20/2023 to check Inotrope/Dobutamine therapy coverage.    Please contact Intake with any questions, [phone] or In Basket pool, [facility] ([phone]).

## 2023-06-26 NOTE — PROGRESS NOTES
[facility]   Cardiology II Service / Advanced Heart Failure  Daily Progress Note  Date of Service: 6/26/2023      Patient: Eliseo Tanner  MRN: [id number]  Admission Date: 6/20/2023  Hospital Day # 6    Assessment and Plan:  Mr. Eliseo Tanner is a 67yr old male with a history of chronic systolic heart failure secondary to NICM (LVEF 15-20% per TTE 9/2022), s/p HM3 LVAD (2/18/2020, DT) c/b recurrent/chronic drive-line infections, moderate CAD, ABHINAV (on CPAP), DMII, HTN, CKD-III, ANA, and newly diagnosed cirrhosis who was admitted due to progressive shortness of breath and refractory fluid overload.      PLAN:  - dobutamine is covered for outpatient use --> will refer patient for education, scheduled for Thursday  - continue diuresis, repeating diuril 500mg IV x 1 today  - trend BMP  - loperamide up to QID prn given watery stools, negative for infection  - anticipate discharge after teaching on Thursday      Acute on chronic biventricular systolic heart failure secondary to NICM (LVEF 15-20% per TTE 9/2022)  Stage D, NYHA Class III  TTE 4/28/23:  septum normal, AV opening with each systole, and LVIDd 5.4cm.  RHC 4/28/23:  RA 12, mPA 23. PCW 12, CI 3.6 -- when LVAD speed was dropped, RA did not improve but PCW increased.    He has had multiple hospitalizations for decompensated heart failure.  This admission, NTproBNP was 9K, weight 197# (was 176# at last discharge 5/31/23, EDS ~178#).    RHC 6/21/23:  RA 15, mPA 26, PCW 16, and CI 2.57 at LVAD speed 5800rpm.  VAD speed subsequently increased to 5900rpm.    - ACEi/ARB/ARNi:  Deferred due to renal dysfunction  - Afterload reduction:  Hydralazine 25mg TID  - BB:  Deferred due to RV dysfunction  - Aldosterone antagonist:  Deferred due to renal dysfunction  - SGLT2i:  STOPPED PTA Jardiance due to renal dysfunction  - RV support/inotropy:  Dobutamine @ 2.5mcg/kg/min.  Have trialed cilostazol, but stopped due to epistaxis.  Will discharge on dobutamine, he is now  scheduled for education in [facility] on Thursday.  - SCD ppx:  ICD  - Fluid status:  Remains hypervolemic.  Diuril given 6/25, weight down nearly 1# today.  Will continue bumex @ 2mg/hr and repeat diuril 500mg IV x 1 today.    s/p HM3 LVAD (2/18/20, DT)  - antiplatelet:  Not on PTA, possibly due to past epistaxis.  Defer re-trial'ing to outpatient team.  - anticoagulation:  Warfarin, goal INR 1.7-2.3 (due to epistaxis).  INR today 2.06.  - MAPs:  70-80s  - LDH:  358 today, trend weekly    Chronic LVAD drive line infection, MSSA and PSA  Follows with LVAD ID, had recently been switched from cipro and daptomycin to cefepime on 5/16 d/t culture growing quinolone resistant PSA and no MSSA.  Infection appears to be well-controlled currently - he is afebrile on admit with elevated WBC, and driveline site is without drainage, erythema, or pain on admission.   - Continue IV cefepime (renally dosed 1g q24h)  - Outpatient follow-up with LVAD ID    Diarrhea  Reports diarrhea since admission, and that it always happens when admitted here.  He states that he experiences relief with loperamide.  Enteric panel was negative for infection 6/25.  - loperamide, up to QID prn    SIRS criteria  On admission, was tachycardic and found to have leukocytosis (WBC 14.7).  No signs of infection (BG NGTD, UA unremarkable).  Leukocytosis resolved.  Low concern for uncontrolled infection.  Driveline drainage has improved since last hospitalization.  - continue home Cefepime infusion, has R sided PICC line (previously placed)  - follows with ID    BERNARDA vs CKD progression   Cr was 3.24 on arrival, has been previously stable at 1.9-2.2.  BERNARDA possibly secondary to CRS/hypervolemia.  UA with microscopy showed 20 protein and small blood.  Creat has improved with diuresis.  - dobutamine, as above  - avoid nephrotoxins as possible.   - diuresis, as above  - continue to monitor  - will consider renal ultrasound if creat does not continue to  improve    NSVT  Noted to have 5 beats of polymorphic VT after starting dobutamine.  - continue to monitor per tele  - continue amiodarone 200mg daily    Hyponatremia, chronic  Na 133 on admission, suspect hypervolemic hyponatremia in setting of CHF.   - diuresis, as above  - continue to monitor    Recently diagnosed cirrhosis with portal hypertension  Elevated AST, mild  Mildly elevated LFTs, stable.  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  No evidence of decompensation. CT A/P (5/20) showed stable small volume ascites and mild mesenteric/retroperitoneal edema/cholelithiasis. Hepatology consulted 5/30, no concern for HE though at risk for.  Ensure that he is passing 2 formed BMs daily.       OTHER/CHRONIC CONDITIONS:   Hypothyroidism:  Last T4 was 0.97 on 5/21/23, continue PTA levothyroxine 100mcg qAM.  GERD:  Continue PTA pantoprazole 40mg BID   Normocytic anemia/BELA:  Baseline 8-9, remains stable.  Continue PTA ferrous sulfate 325mg daily.   BPH:  Continue PTA finasteride 5mg daily and tamsulosin 0.8mg qPM.  DMII:  Most recent A1c 6.3 on 4/17/2023, continue LDSSI, hypoglycemia protocol.  Not on treatment PTA.  Gout:  Continue Allopurinol 200mg daily (confirmed that no further dose adjustment needed given renal function decreased)  h/o HIT:  Avoid heparin products.  ABHINAV:  Continue CPAP.      Diet:  Fluid restriction 2000mL FLUID, 2g Sodium Diet    DVT Prophylaxis: Warfarin, ambulate  Guevara Catheter: Not present  Cardiac Monitoring: ACTIVE order. Indication: Acute decompensated heart failure (48 hours)  Code Status: Full Code    =============================================================    Interval History/ROS:   Mr. Tanner states that he feels ok.  He notes ongoing fluid retention, but does not believe that we will be able to remove any more.  He feels like he urinated a lot, and is frustrated that he only lost  one pound.  He does not believe that he is drinking in excess.  His breathing remains stable, and he denies SOB, PND, and orthopnea.  He is eating, with regular appetite.  He denies nausea, vomiting, and constipation.  He has had watery stools since his admission, and states that this always happens when he is hospitalized here.  He otherwise denies chest pain, palpitations, dizziness, headaches, acute vision changes, fevers, chills, cough, sore throat, and signs of bleeding.    Called wife, Chapis, with update.  She verbalized that she will be here on Thursday for PLC education and potential discharge home.      Last 24 hr care team notes reviewed.   ROS:  4 point ROS including Respiratory, CV, GI and , other than that noted in the HPI, is negative.     Medications: Reviewed in EPIC.     Physical Exam:   BP 96/84   Pulse 104   Temp 98  F (36.7  C) (Oral)   Resp 16   Wt 83.6 kg (184 lb 6.4 oz)   SpO2 95%   BMI 28.88 kg/m    GENERAL: Appears alert and oriented times three.   HEENT: Eye symmetrical and free of discharge bilaterally. Mucous membranes moist and without lesions.  NECK: Supple and without lymphadenopathy. JVD at jaw when lying at 45 .   CV: +mechanical LVAD hum.   RESPIRATORY: Respirations regular, even, and unlabored. Lungs CTA throughout.   GI: Soft and full with normoactive bowel sounds present in all quadrants. No tenderness, rebound, guarding. No organomegaly.   EXTREMITIES: No peripheral edema. 2+ bilateral pedal pulses.   NEUROLOGIC: Alert and orientated x 3. CN II-XII grossly intact. No focal deficits.   MUSCULOSKELETAL: No joint swelling or tenderness.   SKIN: No jaundice. No rashes or lesions. Driveline covered, dressing c/d/i.    Data:  CMP  Recent Labs   Lab 06/26/23  0434 06/25/23  2159 06/25/23  1808 06/25/23  1601 06/25/23  0537 06/25/23  0530 06/24/23  2127 06/24/23  2123 06/24/23  0756 06/24/23  0536 06/23/23  2301 06/23/23  1934 06/20/23  1942 06/20/23  1550   *  --  133*  --    --  136  --  139  --  142  --  140   < > 133*   POTASSIUM 3.8  --  4.2  --   --  4.2  --  4.9  --  3.9  --  4.4   < > 3.6   CHLORIDE 102  --  101  --   --  105  --  108*  --  107  --  105   < > 99   CO2 18*  --  18*  --   --  18*  --  17*  --  20*  --  20*   < > 14*   ANIONGAP 15  --  14  --   --  13  --  14  --  15  --  15   < > 20*   * 198* 259* 135*   < > 128*   < > 128*   < > 141*   < > 166*   < > 142*   BUN 68.6*  --  74.9*  --   --  77.2*  --  75.8*  --  79.0*  --  88.0*   < > 101.0*   CR 2.78*  --  2.42*  --   --  2.82*  --  2.69*  --  2.92*  --  3.02*   < > 3.24*   GFRESTIMATED 24*  --  28*  --   --  23*  --  25*  --  23*  --  22*   < > 20*   CALOS 8.2*  --  8.2*  --   --  8.5*  --  8.0*  --  8.2*  --  8.3*   < > 8.2*   MAG 1.9  --   --   --   --  1.8  --   --   --  2.4*  --  1.6*   < > 2.0   PROTTOTAL  --   --   --   --   --   --   --   --   --   --   --   --   --  7.0   ALBUMIN  --   --   --   --   --   --   --   --   --   --   --   --   --  3.3*   BILITOTAL  --   --   --   --   --   --   --   --   --   --   --   --   --  0.4   ALKPHOS  --   --   --   --   --   --   --   --   --   --   --   --   --  135*   AST  --   --   --   --   --   --   --   --   --   --   --   --   --  46*   ALT  --   --   --   --   --   --   --   --   --   --   --   --   --  44    < > = values in this interval not displayed.     CBC  Recent Labs   Lab 06/26/23  0434 06/24/23  0536 06/23/23  0544 06/22/23  0515   WBC 10.1 10.9 10.0 9.5   RBC 2.89* 3.01* 3.03* 3.06*   HGB 8.6* 9.1* 9.1* 9.0*   HCT 28.9* 29.6* 29.1* 29.9*    98 96 98   MCH 29.8 30.2 30.0 29.4   MCHC 29.8* 30.7* 31.3* 30.1*   RDW 21.8* 22.2* 21.6* 21.8*    205 215 201     INR  Recent Labs   Lab 06/26/23  0434 06/25/23  0530 06/24/23  0536 06/23/23  0544   INR 2.06* 2.09* 2.22* 2.19*       Patient discussed with Dr. Dutton.      Aracelis Rose, DNP, FNP-BC  Advanced Heart Failure Nurse Practitioner  [facility]

## 2023-06-26 NOTE — PROGRESS NOTES
"Care Management Follow Up    Length of Stay (days): 6    Expected Discharge Date: 06/27/2023     Concerns to be Addressed: Discharge planning    Patient plan of care discussed at interdisciplinary rounds: Yes    Anticipated Discharge Disposition: Home      Anticipated Discharge Services: Home Infusion, Outpatient Infusion   Anticipated Discharge DME: None    Education Provided on the Discharge Plan: Yes  Patient/Family in Agreement with the Plan: Yes    Referrals Placed by CM/SW:  Home Infusion    Additional Information:  This writer received update on benefit check for home IV dobutamine:    \"Patient meets Medicare criteria for Dobutamine therapy, between Medicare and [facility] supplement patient is covered 100%. Nursing is covered as well for the Dobutamine therapy if needed since patient meets criteria.\"     PLC scheduled for Thursday 6/29 at noon for PICC/IV dobutamine teaching. NP updated pt's wife.     Pt will not have home nursing as it is unavailable in his area so pt/wife will need to be independent with administration prior to discharge.     [facility] (IV dobutamine)  Phone: [phone]    [facility]   Phone: [phone]   Fax: [phone]   Direct line (to be used on the weekend if needed): [phone]   Will need to update once discharge date confirmed to resume pt's outpatient IV abx and PICC line cares. Pt does not have home IV abx coverage so will continue with outpatient infusion.     CC will continue to monitor patient's medical condition and progress towards discharge.  Carmita Farnsworth RN BSN  6C Unit Care Coordinator  Phone number: [phone]  Pager: [phone]        "

## 2023-06-26 NOTE — PLAN OF CARE
Pt admitted 6/20 SOB and refractory hypervolemia and BERNARDA.  HM III LVAD numbers WNL and no alarms noted.  Bumex and Dobutamine gtt continues as ordered.  One dose of Diuril given.  Potassium and Mag replaced.  Drive line dressing changed.  Pt will be going home with IV abx and Dobutamine.  Continue to monitor and with POC.

## 2023-06-26 NOTE — PLAN OF CARE
Hours of Care:  1900 - 0700     Temp:  [97.5  F (36.4  C)-98.1  F (36.7  C)] 98  F (36.7  C)  Pulse:  [102-112] 104  Resp:  [16-18] 16  BP: (80-96)/(67-84) 96/84  SpO2:  [95 %-96 %] 95 %           D: 66 yo male admitted 6/20 for BERNARDA and hypotension. History of Systolic HF, HM3 (2020), CAD, HTN, ABHINAV on home CPAP overnight, DM2, CKD 3, ANA, HM3 complicated by retrosternal hematoma and bleeding into lungs, and chronic MRSA drive line infection.        Neuro: A/O x 4.  Call light appropriate.  Able to make needs known.  Respiratory:  On room air.  Lung sounds clear.  Denies shortness of breath at rest.  Cardiac: LVAD numbers WNL, sinus, Maps 80-90s  GI: Last BM 6/26.  No report of nausea or vomiting. Hold bowel meds for loose stools.  : Urinating adequate amounts of clear, yellow urine  Skin:  See PCS for assessment and treatment of wounds and surgical incisions.  LDA:  Double lumen PICC dobutamine running 2.5mcg/kg/min and bumex at 2mg/hr  Pain: Denies pain  Activity: Independent with walker  Diet: 2 g Na.  2000 mL fluid restriction     P: Continue to monitor Pt status and report changes to Cards 2. Plan for discharge 6/27.

## 2023-06-26 NOTE — PLAN OF CARE
Blood pressure 96/84, pulse 104, temperature 97.5  F (36.4  C), temperature source Axillary, resp. rate 16, weight 83.6 kg (184 lb 6.4 oz), SpO2 98 %.    A:   Neuro: A/Ox4. Calls appropriately. Pleasant and cooperative.   Cardiac/Tele:  VSS except tachy HR 100s. Denies chest pain. Pt has LVAD is WNL. On Dobutamine drip per order and Bumex infusing per order. Cardiomems reading done and @ 23.   Respiratory: Room air. Tolerating well  GI/: Continent. Urinal at bedside.   Diet/Appetite: 2 gram Na+ diet, 2LFR  Skin: No new deficits noted.   LDAs: R Picc x 2 lumen infusing.  Activity: SBA assist with walker and gait belt.   Pain: Denies.      P: Continue with plan of care and notify team with changes.

## 2023-06-26 NOTE — PROGRESS NOTES
CLINICAL NUTRITION SERVICES    Reviewed nutrition risk factors due to LOS. Pt is tolerating diet and eating adequately per nursing documentation (per % intake flowsheets, pt consuming 100% consistently with the exception of 75% twice). Per discussion with RN, reports good/adequate oral intake. Reviewed Wt Hx: 87.1 kg (3/17/21), 99.3 kg (6/24/2022), 80/9 kg (9/11/2022), 79.9 kg (12/20/2022), 91.6 kg (3/23/23), 80 kg (5/31/2023), 87.1 kg (6/20/2023, admit), 83.6 kg (6/26/2023) -  Variable weights with fluid status; diuresis this admission.     Follow Up / Monitoring:   Per protocol.    Abby Woods, MS, RD, LD, [facility]   6C/5A(beds 5201 - 5205) RD pgr: [phone]

## 2023-06-26 NOTE — PROCEDURES
The patient's HeartMate LVAD was interrogated 6/26/2023  * Speed 5900 rpm   * Pulsatility index 3.4-3.6   * Power 4.8-5 Driver   * Flow 5.1-5.5 L/minute   Fluid status: hypervolemic   Alarms were reviewed, with no LVAD alarms or signs of pump malfunction.   The driveline exit site was covered, with dressing c/d/i.   All external components were inspected and showed no evidence of damage or malfunction, none replaced.   No changes to VAD settings made.      Aracelis Rose DNP, P-BC  Advanced Heart Failure Nurse Practitioner  [facility]

## 2023-06-27 NOTE — PLAN OF CARE
D: Eliseo Tanner is a 67 year old male who has a history of chronic systolic heart failure secondary to NICM (Stage D, NYHA Class IIIA) s/p HM 3 (2/18/2020), moderate CAD, HTN, ABHINAV on CPAP, DM2, CKD Stage III, ANA. 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 He is admitted for volume overload secondary to decompensated heart failure    I: Monitored vitals and assessed pt status.      PRN:   Tele: ST  O2: RA  Mobility: SBA     A: Neuro: A/O x4.  Call light appropriate.  Able to make needs known.  Respiratory:  On room air.  Lung sounds clear.  Denies shortness of breath at rest.   Cardiac: VSS. Afebrile. MAPs in 70s-80s. LVAD numbers WNL. No alarms.  GI: No BM this shift.  No report of nausea or vomiting.  : Voiding adequate clear, yellow urine using urinal.  Skin:  Intact  LDA: DL PICC infusing Dobutamine & Bumex.  Pain: Denies  Diet: Cardiac 2L FR     P: Continue to monitor Pt status and report changes to Cards 2.      Goal Outcome Evaluation:      Plan of Care Reviewed With: patient    Overall Patient Progress: no change

## 2023-06-27 NOTE — PROCEDURES
The patient's HeartMate LVAD was interrogated 6/27/2023  * Speed 5900 rpm   * Pulsatility index 3.5  * Power 4.8 Driver   * Flow 5.1 L/minute   Fluid status: mildly hypervolemic  Alarms were reviewed, with no LVAD alarms or signs of pump malfunction.   The driveline exit site was covered, with dressing c/d/i.   All external components were inspected and showed no evidence of damage or malfunction, none replaced.   No changes to VAD settings made.

## 2023-06-27 NOTE — PROGRESS NOTES
[facility]   Cardiology II Service / Advanced Heart Failure  Daily Progress Note  Date of Service: 6/26/2023      Patient: Eliseo Tanner  MRN: [id number]  Admission Date: 6/20/2023  Hospital Day # 7    Assessment and Plan:  Eliseo Tanner is a 67yr old male with a history of chronic systolic heart failure secondary to NICM (LVEF 15-20% per TTE 9/2022), s/p HM3 LVAD (2/18/2020, DT) c/b recurrent/chronic drive-line infections, moderate CAD, ABHINAV (on CPAP), DMII, HTN, CKD-III, ANA, and newly diagnosed cirrhosis who was admitted due to progressive shortness of breath and refractory fluid overload. Has been started on dobutamine to aid with diuresis and plan is to continue this as outpatient. .    PLAN:  - continue IV Bumex gtt at 2/hr  - switch to orals ?tomorrow/when at EDW  - dobutamine teaching on Thursday    # Acute on chronic biventricular systolic heart failure secondary to NICM (LVEF 15-20% per TTE 9/2022)  # s/p HM3 LVAD (2/18/20, DT) c/b late RV failure  Stage D, NYHA Class III  TTE 4/28/23:  septum normal, AV opening with each systole, and LVIDd 5.4cm.  RHC 4/28/23: RA 12, mPA 23. PCW 12, CI 3.6 - when LVAD speed was dropped, RA did not improve but PCW increased.  RHC 6/21/23:  RA 15, mPA 26, PCW 16, CI 2.57 at LVAD speed 5800rpm.  VAD speed increased to 5900rpm.    Multiple hospitalizations for decompensated RV failure.  This admission, NTproBNP 9K, weight 197# (176# last discharge 5/31/23, EDW ~178#).      - Fluid status: hypervolemic but approaching EDW, continue bumex @ 2mg/hr   - ACEi/ARB/ARNi:  Deferred due to renal dysfunction  - Afterload reduction:  Hydralazine 25mg TID  - BB:  Deferred due to RV dysfunction  - Aldosterone antagonist:  Deferred due to renal dysfunction  - SGLT2i:  STOPPED PTA Jardiance due to renal dysfunction  - RV support/inotropy:  Dobutamine @ 2.5mcg/kg/min, plan is to discharge on dobutamine, scheduled for education in [facility] on Thursday  - SCD ppx:  ICD  - Antiplatelet:   not on PTA, possibly due to past epistaxis.  Defer re-trial'ing to outpatient team.  - Anticoagulation:  Warfarin goal INR 1.7-2.3 (due to epistaxis).  INR today 1.98  - MAPs:  70-80s  - LDH:  358, trend weekly    # Chronic LVAD drive line infection, MSSA and PSA  Follows with LVAD ID, had recently been switched from cipro and daptomycin to cefepime on 5/16 d/t culture growing quinolone resistant PSA and no MSSA.  Infection appears to be well-controlled currently - afebrile on admit with elevated WBC, and driveline site is without drainage, erythema, or pain on admission.   - intermittent CBC  - Continue IV cefepime (renally dosed 1g q24h)  - Outpatient follow-up with LVAD ID    # Diarrhea  Reports diarrhea since admission, and that it always happens when admitted here, relieved with loperamide. Enteric panel negative 6/25.  - loperamide, up to QID prn    # SIRS criteria  On admission, was tachycardic and found to have leukocytosis (WBC 14.7).  No signs of infection (BG NGTD, UA unremarkable).  Leukocytosis resolved.  Low concern for uncontrolled infection.  Driveline drainage has improved since last hospitalization.  - continue home Cefepime, R sided PICC line (previously placed)  - follows with ID    # BERNARDA vs CKD progression   Cr 3.24 on arrival, previously stable at 1.9-2.2.  BERNARDA possibly secondary to CRS/hypervolemia.  UA with microscopy showed 20 protein and small blood.  Creat has improved with diuresis.  - dobutamine, as above  - avoid nephrotoxins as possible  - diuresis, as above  - q12hr BMP  - consider renal ultrasound if creat does not continue to improve    # NSVT  5 beats of polymorphic VT after starting dobutamine.  - continue to monitor tele  - continue amiodarone 200mg daily    # Hypervolemic hyponatremia, chronic  Na 133 on admission, suspect hypervolemic hyponatremia in setting of HF.   - diuresis, as above  - continue to monitor    # Recently diagnosed cirrhosis with portal hypertension  #  Elevated AST, mild  Mildly elevated LFTs, stable.  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  No evidence of decompensation. CT A/P (5/20) showed stable small volume ascites and mild mesenteric/retroperitoneal edema/cholelithiasis. Hepatology consulted 5/30, no concern for HE though at risk for. Ensure that he is passing 2 formed BMs daily.     OTHER/CHRONIC CONDITIONS:   Hypothyroidism:  TSH 6/27 9 free T4 1.3, continue PTA levothyroxine 100mcg qAM. Repeat   GERD:  PTA pantoprazole 40mg BID   Normocytic anemia/BELA:  Baseline 8-9, stable. Continue PTA ferrous sulfate 325mg daily.   BPH:  Continue PTA finasteride 5mg daily and tamsulosin 0.8mg qPM.  DMII:  A1c 6.3 , continue LDSSI, hypoglycemia protocol.  Not on treatment PTA.  Gout:  Continue Allopurinol 200mg daily (confirmed that no further dose adjustment needed given renal function decreased)  h/o HIT:  Avoid heparin products.  ABHINAV:  Continue CPAP.    Diet:  Fluid restriction 2000mL FLUID, 2g Sodium Diet    DVT Prophylaxis: Warfarin, ambulate  Guevara Catheter: Not present  Cardiac Monitoring: ACTIVE order. Indication: Acute decompensated heart failure (48 hours)  Code Status: Full Code      Siena Aguiar DNP, NP-C  Advanced Heart Failure/Cardiology 2 Nurse Practitioner  Pager         =============================================================    Interval History/ROS:   No overnight events. Patient denies any issues this AM. Breathing feels comfortable. Leg edema improved. Able to lay flat comfortably. Asked appropriate questions re; dobutamine       Last 24 hr care team notes reviewed.   ROS:  4 point ROS including Respiratory, CV, GI and , other than that noted in the HPI, is negative.     Medications: Reviewed in EPIC.     Physical Exam:   BP 96/84   Pulse 73   Temp 98.5  F (36.9  C) (Oral)   Resp 16   Wt 82.5 kg (181 lb 14.4 oz)   SpO2 96%    BMI 28.49 kg/m       GENERAL: Appears comfortable in no distress   HEENT: Eye symmetrical and free of discharge bilaterally. Mucous membranes moist and without lesions.  NECK: JVD at jaw at 45 .   CV: +mechanical LVAD hum.   RESPIRATORY: Respirations regular, even, and unlabored. Lungs CTA throughout.   GI: Soft and full with normoactive bowel sounds present in all quadrants. No tenderness, rebound, guarding. No organomegaly.   EXTREMITIES: Trace ankle peripheral edema. 2+ bilateral pedal pulses.   NEUROLOGIC: Alert and orientated x 3. . No focal deficits.   MUSCULOSKELETAL: No joint swelling or tenderness.   SKIN: No jaundice. No rashes or lesions. Driveline covered, dressing c/d/i.    Data:  CMP  Recent Labs   Lab 06/27/23  1151 06/27/23  0735 06/27/23  0608 06/26/23  2131 06/26/23  1730 06/26/23  0742 06/26/23  0434 06/25/23  2159 06/25/23  1808 06/25/23  0537 06/25/23  0530 06/24/23  0756 06/24/23  0536 06/20/23  1942 06/20/23  1550   NA  --   --  137  --  134*  --  135*  --  133*  --  136   < > 142   < > 133*   POTASSIUM  --   --  3.2*  --  4.2  --  3.8  --  4.2  --  4.2   < > 3.9   < > 3.6   CHLORIDE  --   --  103  --  103  --  102  --  101  --  105   < > 107   < > 99   CO2  --   --  20*  --  18*  --  18*  --  18*  --  18*   < > 20*   < > 14*   ANIONGAP  --   --  14  --  13  --  15  --  14  --  13   < > 15   < > 20*   * 113* 104* 145* 138*   < > 140*   < > 259*   < > 128*   < > 141*   < > 142*   BUN  --   --  68.3*  --  73.5*  --  68.6*  --  74.9*  --  77.2*   < > 79.0*   < > 101.0*   CR  --   --  2.81*  --  2.78*  --  2.78*  --  2.42*  --  2.82*   < > 2.92*   < > 3.24*   GFRESTIMATED  --   --  23*  --  24*  --  24*  --  28*  --  23*   < > 23*   < > 20*   CALOS  --   --  8.5*  --  8.4*  --  8.2*  --  8.2*  --  8.5*   < > 8.2*   < > 8.2*   MAG  --   --  1.6*  --   --   --  1.9  --   --   --  1.8  --  2.4*   < > 2.0   PROTTOTAL  --   --   --   --   --   --   --   --   --   --   --   --   --   --  7.0    ALBUMIN  --   --   --   --   --   --   --   --   --   --   --   --   --   --  3.3*   BILITOTAL  --   --   --   --   --   --   --   --   --   --   --   --   --   --  0.4   ALKPHOS  --   --   --   --   --   --   --   --   --   --   --   --   --   --  135*   AST  --   --   --   --   --   --   --   --   --   --   --   --   --   --  46*   ALT  --   --   --   --   --   --   --   --   --   --   --   --   --   --  44    < > = values in this interval not displayed.     CBC  Recent Labs   Lab 06/26/23 0434 06/24/23  0536 06/23/23  0544 06/22/23  0515   WBC 10.1 10.9 10.0 9.5   RBC 2.89* 3.01* 3.03* 3.06*   HGB 8.6* 9.1* 9.1* 9.0*   HCT 28.9* 29.6* 29.1* 29.9*    98 96 98   MCH 29.8 30.2 30.0 29.4   MCHC 29.8* 30.7* 31.3* 30.1*   RDW 21.8* 22.2* 21.6* 21.8*    205 215 201     INR  Recent Labs   Lab 06/27/23  0608 06/26/23 0434 06/25/23  0530 06/24/23  0536   INR 1.98* 2.06* 2.09* 2.22*       Patient discussed with Dr. Dutton.

## 2023-06-27 NOTE — PLAN OF CARE
AO, doppler maps 70's - 80's, clear lung sounds, on room air, denies pain, loose BM X 1, imodium PRN, adequate UOP, Bumex and Dobutamine running continuous through 2L PICC. VAD numbers WDL, no alarms Dressing changed.    PLAN:  - dobutamine is covered for outpatient use --> will refer patient for education, scheduled for Thursday  - continue diuresis, repeating diuril 500mg IV x 1 today  - trend BMP  - loperamide up to QID prn given watery stools, negative for infection  - anticipate discharge after teaching on Thursday

## 2023-06-28 NOTE — PROGRESS NOTES
Home Infusion    6/28/23:  Received referral from [facility] for IV inotropes.  Benefits verified.  Patient meets Medicare criteria for Dobutamine therapy, between Medicare and BCBS supplement he is covered 100%.  Spoke with spouse to review home infusion services, review benefits and offer choice of providers.  Patient would like to use [facility] for home infusion.  Confirmed discharge address, phone, and emergency contact information.  Eliseo is expected to dc tomorrow and will be going home on IV Dobutamine.  His spouse has done home IV therapy before via IV Push method but not with a pump.  She will meet with [facility] tomorrow for Cadd pump teaching.  Provided spouse with information about [facility] services.  Explained about administration method, the pump, medication bag and asia pack used.   I informed spouse that I will assist with hook up of patient's home medication here at the hospital on day of discharge.  Informed spouse about supplies and delivery of supplies, storage of medication, continuous infusion requirements, backup pump and medication bag, and 24/7 availability of I staff while on IV therapy.  Patient will not have home nursing.  He is going to local hospital for daily infusions and will get PICC line dressing changes done there.   Spouse verbalized understanding of all information given.  She is willing to learn and manage home IV therapy.  Questions answered.  Pending final discharge orders, [facility] will plan to deliver medication and supplies to hospital by noon Thursday.    Thank you for the referral    6/29/23:   Eliseo is discharging today and will be going home on continuous Dobutamine therapy.  He requires a hook up of home medication and pump prior to dc.  Met with patient at bedside once supplies arrived.  Assisted spouse with hook up of patient's dobutamine after reviewing pump settings and verifying with orders.  Instructed spouse not to flush medication lumen with NS.  Provided information  on supplies and ongoing supply deliveries, storage of medication, back up pump, 24/7 availability of [facility] staff and how to contact as needed while on home IV therapy.    Spouse verbalized understanding of information given.  She demonstrated very good technique with CADD pump administration and feels comfortable managing the IV therapy at home.  Eliseo's home dobutamine infusion is running as of 1339 and he is ready for discharge from [facility] perspective.        KRISTEN Patel  [facility] Nurse Liaison   [email or web address]  Cell: [phone] M-F  [facility] Main: [phone]

## 2023-06-28 NOTE — PROGRESS NOTES
Care Management Follow Up    Length of Stay (days): 8    Expected Discharge Date: 06/29/2023     Concerns to be Addressed: Discharge planning     Patient plan of care discussed at interdisciplinary rounds: Yes    Anticipated Discharge Disposition: Home      Anticipated Discharge Services: Home Infusion, Outpatient Infusion   Anticipated Discharge DME: No new DME    Education Provided on the Discharge Plan: Yes  Patient/Family in Agreement with the Plan: Yes    Additional Information:  Per NP, plan for discharge home tomorrow. Pt is scheduled for [facility] for dobutamine teaching at noon tomorrow, 6/29. This writer called wifeChapis and confirmed this appointment time still works for her tomorrow and then she will transport pt home after. Updated [facility] liason on plan for discharge tomorrow, NP placed dobutamine order.     [facility] (IV dobutamine)  Phone: [phone]    [facility]   Phone: [phone]   Fax: [phone]   Direct line (to be used on the weekend if needed): [phone]   This writer called and spoke with pharmacist Shasta to update on plan for discharge tomorrow. Pt to resume outpatient IV cefepime infusions starting Friday 6/30. Shasta requested new orders be faxed including recommended labs.     CC will continue to monitor patient's medical condition and progress towards discharge.  Carmita Farnsworth RN BSN  6C Unit Care Coordinator  Phone number: [phone]  Pager: [phone]

## 2023-06-28 NOTE — PLAN OF CARE
Blood pressure (!) 81/52, pulse 108, temperature 97.5  F (36.4  C), temperature source Oral, resp. rate 18, weight 82.5 kg (181 lb 14.4 oz), SpO2 97 %.     A:   Neuro: A/Ox4. Calls appropriately. Pleasant and cooperative.   Cardiac/Tele:  VSS. Maps 80s.  Afebrile. Denies chest pain   Respiratory: Room air. Tolerating well  GI/: Continent. Urinal at bedside.  Diet/Appetite: 2 gram Na+ diet, 2LFR  Skin: No new deficits noted.   LDAs: Right PICC with infusing dobutamine and bumex.   Activity: SBA with gait belt and walker  Pain: Denies.      P: Continue to monitor and notify team with changes. Education thursday regarding dobutamine home drip.

## 2023-06-28 NOTE — PROGRESS NOTES
Neuro: A&Ox4; [place]; wears glasses  Cardiac: Sinus tachycardia; dobutamine drip (going home on this, new for him); heartmate 3 LVAD  Respiratory: On room air; diminished lungs; no cough; sleeps with CPAP  GI/: Diarrhea, PRN Imodium; uses bedside urinal; bowel sounds active  Endocrine: BS ACHS  Diet/Appetite: 2 gram sodium diet; 2L fluid restriction  Skin: Driveline site; skin bruised and thin; skin tears on arms  LDA: Right double lumen PICC  Activity: Stand by assist  Pain: No complaints of pain  Plan: Teaching about PICC and dobutamine tomorrow with wife at 1200 in patients room, will discharge home after that

## 2023-06-28 NOTE — PROGRESS NOTES
[facility]   Cardiology II Service / Advanced Heart Failure  Daily Progress Note  Date of Service: 6/26/2023      Patient: Eliseo Tanner  MRN: [id number]  Admission Date: 6/20/2023  Hospital Day # 8    Assessment and Plan:  Eliseo Tanner is a 67yr old male with a history of chronic systolic heart failure secondary to NICM (LVEF 15-20% per TTE 9/2022), s/p HM3 LVAD (2/18/2020, DT) c/b recurrent/chronic drive-line infections, moderate CAD, ABHINAV (on CPAP), DMII, HTN, CKD-III, ANA, and newly diagnosed cirrhosis who was admitted due to progressive shortness of breath and refractory fluid overload. Has been started on dobutamine to aid with diuresis and plan is to continue this as outpatient. .    PLAN:  - switch IV Bumex gtt to torsemide 100 mg TID (pta)  - resume pta hydrochlorothiazide and increase standing kcl   - dobutamine teaching on Thursday then likely discharge     # Acute on chronic biventricular systolic heart failure secondary to NICM (LVEF 15-20% per TTE 9/2022)  # s/p HM3 LVAD (2/18/20, DT) c/b late RV failure  Stage D, NYHA Class III  TTE 4/28/23:  septum normal, AV opening with each systole, and LVIDd 5.4cm.  RHC 4/28/23: RA 12, mPA 23. PCW 12, CI 3.6 - when LVAD speed was dropped, RA did not improve but PCW increased.  RHC 6/21/23:  RA 15, mPA 26, PCW 16, CI 2.57 at LVAD speed 5800rpm.  VAD speed increased to 5900rpm.    Multiple hospitalizations for decompensated RV failure.  This admission, NTproBNP 9K, weight 197# (176# last discharge 5/31/23, EDW ~178#).      - Fluid status: PAD at goal now, change bumex @ 2mg/hr to torsemide 100 mg tid   - ACEi/ARB/ARNi:  Deferred due to renal dysfunction  - Afterload reduction:  Hydralazine 25mg TID resumed  - BB:  Deferred due to RV dysfunction  - Aldosterone antagonist:  Deferred due to renal dysfunction  - SGLT2i:  Jardiance stopped due to renal dysfunction  - RV support/inotropy:  Dobutamine @ 2.5mcg/kg/min, plan is to discharge on dobutamine,  scheduled for education in [facility] on Thursday  - SCD ppx:  ICD  - Antiplatelet:  not on PTA, possibly due to past epistaxis.  Defer re-trial'ing to outpatient team.  - Anticoagulation:  warfarin goal INR 1.7-2.3 (due to epistaxis). INR today 1.8  - MAPs:  80s  - LDH:  358, trend weekly    # Chronic LVAD drive line infection, MSSA and PSA  Follows with LVAD ID, had recently been switched from cipro and daptomycin to cefepime on 5/16 d/t culture growing quinolone resistant PSA and no MSSA.  Infection appears to be well-controlled currently - afebrile on admit with elevated WBC, and driveline site is without drainage, erythema, or pain on admission.   - intermittent CBC  - Continue IV cefepime (renally dosed 1g q24h)  - Outpatient follow-up with LVAD ID    # Diarrhea  Reports diarrhea since admission, and that it always happens when admitted here, relieved with loperamide. Enteric panel negative 6/25.  - loperamide, up to QID prn    # SIRS criteria  On admission, was tachycardic and found to have leukocytosis (WBC 14.7).  No signs of infection (BG NGTD, UA unremarkable).  Leukocytosis resolved.  Low concern for uncontrolled infection.  Driveline drainage has improved since last hospitalization.  - continue home Cefepime, R sided PICC line (previously placed)  - follows with ID    # BERNARDA vs CKD progression   Cr 3.24 on arrival, previously stable at 1.9-2.2.  BERNARDA possibly secondary to CRS/hypervolemia.  UA with microscopy showed 20 protein and small blood.  Creat has improved with diuresis.  - dobutamine, as above  - avoid nephrotoxins as possible  - diuresis, as above  - q12hr BMP  - consider renal ultrasound if creat does not continue to improve    # NSVT  5 beats of polymorphic VT after starting dobutamine.  - continue to monitor tele  - continue amiodarone 200mg daily    # Hypervolemic hyponatremia, chronic  Na 133 on admission, suspect hypervolemic hyponatremia in setting of HF.   - diuresis, as above  - continue to  monitor    # Recently diagnosed cirrhosis with portal hypertension  # Elevated AST, mild  Mildly elevated LFTs, stable.  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  No evidence of decompensation. CT A/P (5/20) showed stable small volume ascites and mild mesenteric/retroperitoneal edema/cholelithiasis. Hepatology consulted 5/30, no concern for HE though at risk for. Ensure that he is passing 2 formed BMs daily.     OTHER/CHRONIC CONDITIONS:   Hypothyroidism:  TSH 6/27 9 free T4 1.3, continue PTA levothyroxine 100mcg qAM. Repeat   GERD:  PTA pantoprazole 40mg BID   Normocytic anemia/BELA:  Baseline 8-9, stable. Continue PTA ferrous sulfate 325mg daily.   BPH:  Continue PTA finasteride 5mg daily and tamsulosin 0.8mg qPM.  DMII:  A1c 6.3 , continue LDSSI, hypoglycemia protocol.  Not on treatment PTA.  Gout:  Continue Allopurinol 200mg daily (confirmed that no further dose adjustment needed given renal function decreased)  h/o HIT:  Avoid heparin products.  ABHINAV:  Continue CPAP.    Diet:  Fluid restriction 2000mL FLUID, 2g Sodium Diet    DVT Prophylaxis: Warfarin, ambulate  Guevara Catheter: Not present  Cardiac Monitoring: ACTIVE order. Indication: Acute decompensated heart failure (48 hours)  Code Status: Full Code      Siena Aguiar DNP, NP-C  Advanced Heart Failure/Cardiology 2 Nurse Practitioner  Pager       =============================================================    Interval History/ROS:   No overnight events. Feeling fine. CardioMEMS reading today is within goal. No shortness of breath.     Last 24 hr care team notes reviewed.   ROS:  4 point ROS including Respiratory, CV, GI and , other than that noted in the HPI, is negative.     Medications: Reviewed in EPIC.     Physical Exam:   BP (!) 81/52 (BP Location: Left arm)   Pulse 107   Temp 98.1  F (36.7  C) (Oral)   Resp 18   Wt 83.3 kg (183 lb 11.2 oz)    SpO2 98%   BMI 28.77 kg/m       GENERAL: Appears comfortable in no distress   HEENT: Eye symmetrical and free of discharge bilaterally. Mucous membranes moist and without lesions.  NECK: JVD midneck at 45 .   CV: +mechanical LVAD hum.   RESPIRATORY: Respirations regular, even, and unlabored. Lungs CTA throughout.   GI: Soft and full with normoactive bowel sounds present in all quadrants. No tenderness, rebound, guarding. No organomegaly.   EXTREMITIES: Trace ankle peripheral edema. 2+ bilateral pedal pulses.   NEUROLOGIC: Alert and orientated x 3. . No focal deficits.   MUSCULOSKELETAL: No joint swelling or tenderness.   SKIN: No jaundice. No rashes or lesions. Driveline covered, dressing c/d/i.    Data:  CMP  Recent Labs   Lab 06/28/23  0719 06/28/23  0612 06/28/23  0040 06/27/23  2127 06/27/23  1803 06/27/23  1721 06/27/23  1312 06/27/23  0735 06/27/23  0608 06/26/23  2131 06/26/23  1730   NA  --  138  --   --  136  --   --   --  137  --  134*   POTASSIUM  --  3.3*  --   --  4.5  --  4.0  --  3.2*  --  4.2   CHLORIDE  --  104  --   --  104  --   --   --  103  --  103   CO2  --  21*  --   --  19*  --   --   --  20*  --  18*   ANIONGAP  --  13  --   --  13  --   --   --  14  --  13   * 98  --  143* 176*   < >  --    < > 104*   < > 138*   BUN  --  57.9*  --   --  64.4*  --   --   --  68.3*  --  73.5*   CR  --  2.36*  --   --  2.63*  --   --   --  2.81*  --  2.78*   GFRESTIMATED  --  29*  --   --  25*  --   --   --  23*  --  24*   CALOS  --  8.2*  --   --  8.0*  --   --   --  8.5*  --  8.4*   MAG  --  2.3 2.0  --  1.5*  --   --   --  1.6*  --   --     < > = values in this interval not displayed.     CBC  Recent Labs   Lab 06/26/23  0434 06/24/23  0536 06/23/23  0544 06/22/23  0515   WBC 10.1 10.9 10.0 9.5   RBC 2.89* 3.01* 3.03* 3.06*   HGB 8.6* 9.1* 9.1* 9.0*   HCT 28.9* 29.6* 29.1* 29.9*    98 96 98   MCH 29.8 30.2 30.0 29.4   MCHC 29.8* 30.7* 31.3* 30.1*   RDW 21.8* 22.2* 21.6* 21.8*    205  215 201     INR  Recent Labs   Lab 06/28/23  0612 06/27/23  0608 06/26/23  0434 06/25/23  0530   INR 1.87* 1.98* 2.06* 2.09*       Patient discussed with Dr. Dutton.

## 2023-06-28 NOTE — PROCEDURES
The patient's HeartMate LVAD was interrogated 6/28/2023  * Speed 5900 rpm   * Pulsatility index 3.9  * Power 4.9 Driver   * Flow 5 L/minute   Fluid status: mildly hypervolemic  Alarms were reviewed, with no LVAD alarms or signs of pump malfunction.   The driveline exit site was covered, with dressing c/d/i.   All external components were inspected and showed no evidence of damage or malfunction, none replaced.   No changes to VAD settings made.

## 2023-06-28 NOTE — PROGRESS NOTES
"SPIRITUAL HEALTH SERVICES Progress Note  [facility]  EB 6C    Saw pt Eliseo Tanner per length of stay.    Illness Narrative - Eliseo did not opt to explore reasons for this hospitalization today.    Distress - \"I'm ready to get out of here.\"    Meaning-Making - Eliseo named family as mckeon support system, and is especially close with wife and granddaughter. Eliseo shared a program from a recent play with me that showed granddaughter in a starring role. Named being very proud of skills in performing and visual arts.    Plan - Eliseo anticipates discharging tomorrow. No follow up expected; Spiritual Health Services remains available for patient, family, and staff support.         CHEPE Woodson.   Associate        "

## 2023-06-28 NOTE — PLAN OF CARE
D: 67yr old male with a history of chronic systolic heart failure secondary to NICM (LVEF 15-20% per TTE 9/2022), s/p HM3 LVAD (2/18/2020, DT) c/b recurrent/chronic drive-line infections, moderate CAD, ABHINAV (on CPAP), DMII, HTN, CKD-III, ANA, and newly diagnosed cirrhosis who was admitted due to progressive shortness of breath and refractory fluid overload. Has been started on dobutamine to aid with diuresis and plan is to continue this as outpatient.     I: Monitored vitals and assessed pt status.   LDA: R DL PICC  Running:   -Bumex @ 2 mg/hr (8 mL/hr)  -Dobutamine @ 2.5 mcg/kg/min (6.4 mL/hr)  Tele: ST w/ BBB  O2: RA awake, CPAP sleep  Mobility: SBA + walker/GB     A: A0x4. Calls appropriately, able to make needs known. VSS. Afebrile. LVAD HM3 numbers WNL, no alarms. Doppler MAP's 80-86. Mild MIRANDA. Denied pain, CP, HA, SOB, dizziness or lightheadedness, numbness or tingling. 2g Na diet with 2L FR. ACHS POCT. No nausea. Urinating adequately via urinal, voids spontaneously. BM x1 this shift, loose/watery. No new skin deficits noted, see flowsheets. Driveline dressing CDI, daily changes. Slept off and on overnight.      Mg replaced IV x1 this shift per protocol.      P: Continue to monitor Pt status and report changes to Cards 2. Dobutamine teaching on Thursday - possible discharge after. Possible transition to PO diuretics today/when at [facility]W.      Shift of care [id number]

## 2023-06-29 NOTE — PLAN OF CARE
Physical Therapy Discharge Summary    Reason for therapy discharge:    Discharged to home with outpatient therapy.    Progress towards therapy goal(s). See goals on Care Plan in [facility] electronic health record for goal details.  Goals partially met.  Barriers to achieving goals:   discharge from facility.    Therapy recommendation(s):    Continued therapy is recommended.  Rationale/Recommendations:  for continued strengthening and mobility training.

## 2023-06-29 NOTE — PLAN OF CARE
"  Problem: Plan of Care - These are the overarching goals to be used throughout the patient stay.    Goal: Plan of Care Review  Description: The Plan of Care Review/Shift note should be completed every shift.  The Outcome Evaluation is a brief statement about your assessment that the patient is improving, declining, or no change.  This information will be displayed automatically on your shift note.  Outcome: Met  Goal: Patient-Specific Goal (Individualized)  Description: You can add care plan individualizations to a care plan. Examples of Individualization might be:  \"Parent requests to be called daily at 9am for status\", \"I have a hard time hearing out of my right ear\", or \"Do not touch me to wake me up as it startles me\".  Outcome: Met  Goal: Absence of Hospital-Acquired Illness or Injury  Outcome: Met  Intervention: Identify and Manage Fall Risk  Recent Flowsheet Documentation  Taken 6/29/2023 0800 by Ophelia Wood, RN  Safety Promotion/Fall Prevention:   activity supervised   assistive device/personal items within reach   increased rounding and observation   increase visualization of patient   lighting adjusted   nonskid shoes/slippers when out of bed   patient and family education   room near nurse's station   room organization consistent   safety round/check completed  Intervention: Prevent and Manage VTE (Venous Thromboembolism) Risk  Recent Flowsheet Documentation  Taken 6/29/2023 0800 by Ophelia Wood, RN  VTE Prevention/Management: SCDs (sequential compression devices) off  Goal: Optimal Comfort and Wellbeing  Outcome: Met  Goal: Readiness for Transition of Care  Outcome: Met   Goal Outcome Evaluation:                        "

## 2023-06-29 NOTE — CONSULTS
Met with patient and his wife for Patient [facility] PICC and IV medication education. Demonstrated on a model flushing, cap change, use of the emergency clamp, and administration of IV inotrope using the CADD pump. We discussed line safety, never flushing the line with the inotrope, and when to call with concerns or seek medical care. Patient was having a hard time getting his hearing aides to work properly and had a hard time engaging in education for that reason. His wife Jed plans to assist with medication administration at home. She was engaged in learning and was able to return demonstrate administration of the medication on the model without difficulty. She practiced doing a bag change on the model three times and verbalized that she understood the information given.     Literature given: Handwashing and Skin Care, Caring for Your PICC at Home, Changing the End Cap, Flushing the Line with Heparin, Saline or Citrate, How to Change Your Medicine Bag.

## 2023-06-29 NOTE — TELEPHONE ENCOUNTER
ANTICOAGULATION  MANAGEMENT: Discharge Review    Eliseo Tanner chart reviewed for anticoagulation continuity of care    Hospital Admission on 23 - 23 for fluid overload; SOB; BERNARDA.    Discharge disposition: Home.  He will have [facility] for continuous IV dobutamine; and he will continue to go to [facility] daily for IV Cefapime.    Results:    Recent labs: (last 7 days)     23  0544 23  0536 23  0530 23  0434 23  0608 23  0612 23  0510   INR 2.19* 2.22* 2.09* 2.06* 1.98* 1.87* 1.98*     Anticoagulation inpatient management:     see anticoag calendar for inpatient dosing and INR    Anticoagulation discharge instructions:     Warfarin dosin mg MWF and 4 mg all other days of the week   Bridging: No   INR goal change: No      Medication changes affecting anticoagulation: will continue with IV cefepime.  Started dobutamine, hydralazine, hydrochlorothiazide, and lopermide.  Stop Jardiance    Additional factors affecting anticoagulation: Yes: recovering from hospital stay;      PLAN     No adjustment to anticoagulation plan needed  Agree with discharge plan for follow up on 7/3/23    Spoke with Eliseo and Veronique.  He will have INR drawn at [facility] when he has his infusions.   Spoke with Nakul at the [facility] lab. She states they have a standing INR order and Eliseo will be able to have his INR checked on 7/3/23.    Anticoagulation Calendar updated    Krysta Mcdaniel RN

## 2023-06-29 NOTE — PROCEDURES
The patient's HeartMate LVAD was interrogated 6/29/2023  * Speed 5900 rpm   * Pulsatility index 3.8  * Power 4.8 Driver   * Flow 5 L/minute   Fluid status: mildly hypervolemic  Alarms were reviewed, with no LVAD alarms or signs of pump malfunction.   The driveline exit site was covered, with dressing c/d/i.   All external components were inspected and showed no evidence of damage or malfunction, none replaced.   No changes to VAD settings made.

## 2023-06-29 NOTE — PROGRESS NOTES
Care Coordinator  D/I: I have notified WENCESLAO dickson zenaida Rai to hook Eliseo up to his NEW home IV Dobutamine. Pt/wife had PLC this morning for IV dubutamine teach.  P: Pt will resume OP IV Cefapime infusion every day at [facility] tomorrow.  [facility]   Phone: [phone] ---I called an left a message for them approx 2:30 and that I have fax'd his orders and summary.  Fax: [phone]   Direct line (to be used on the weekend if needed): [phone]   LVAD/warfarin--[facility] anticoagulation clinic follows INR--due on 7/3/23.

## 2023-06-29 NOTE — DISCHARGE SUMMARY
Formerly [facility]   Cardiology II Service / Advanced Heart Failure  Discharge Summary     Eliseo Tanner MRN# [id number]   YOB: 1955 Age: 68 year old     DATE OF ADMISSION:  6/20/2023  DATE OF DISCHARGE: 6/29/2023  ADMITTING PROVIDER: Destiny Salmeron MD  DISCHARGE PROVIDER: Jessica Dutton MD and Siena Aguiar DNP, ANP-C   PRIMARY PROVIDER:  Jay Steele    ADMIT DIAGNOSES:     Acute on chronic biV heart failure    S/p HM3 LVAD c/b late RV failure    BERNARDA on CKD, cardiorenal    Chronic LVAD drive line infection, MSSA and PSA    Diarrhea    Hypervolemic hyponatremia    Liver cirrhosis with portal hypertension     DISCHARGE DIAGNOSES:     Chronic biV heart failure    S/p HM3 LVAD c/b late RV failure    BERNARDA on CKD, cardiorenal, improved    Chronic LVAD drive line infection, MSSA and PSA    Diarrhea    Hypervolemic hyponatremia, resolved    Liver cirrhosis with portal hypertension     Diuretic induced hypokalemia and hypomagnesia    FOLLOW-UP:  [] twice weekly labs (BMP)  [] INR on Monday, results to be faxed to [facility]  [] follow-up with ID in two weeks and VAD clinic every 2-4 weeks    PENDING RESULTS:   [] none    HPI: Please see the detailed H & P by Milton Salmeron and Loco from 6/20/2023. Briefly, Eliseo Tanner is a 67 year old male  with history of chronic systolic heart failure secondary to NICM (Stage D, NYHA Class IIIA) s/p HM 3 (2/18/2020), moderate CAD, HTN, ABHINAV on CPAP, DM2, CKD Stage III, ANA. His HM3 post-op course was complicated by retrosternal hematoma and bleeding in the lungs, RV failure, VT in ICU, and Afib w/VR s/p DCCV on 2/28, and a chronic drive line infection with MSSA and PsA on IV antibiotics. He was admitted for volume overload secondary to decompensated heart failure.     He states that he has been gaining more weight recently even though he is compliant with medications. He states that he experiences dyspnea with exertion but none at rest. Denies any recent  illnesses, cough, fever, chills, sweats, falls, dysuria. States that he has not experienced any ICD shocks or LVAD alarms. Appetite is good. No diarrhea or constipation.    HOSPITAL COURSE:   # Acute on chronic biventricular systolic heart failure secondary to NICM (LVEF 15-20% per TTE 9/2022)  # s/p HM3 LVAD (2/18/20, DT) c/b late RV failure  Stage D, NYHA Class IV -> IIIA.     TTE 4/28/23:  septum normal, AV opening with each systole, and LVIDd 5.4cm.  RHC 4/28/23: RA 12, mPA 23. PCW 12, CI 3.6 - when LVAD speed was dropped, RA did not improve but PCW increased.    Multiple hospitalizations for decompensated RV failure.  This admission, NTproBNP 9K, weight 197# (176# last discharge 5/31/23, EDW ~178#). Underwent RHC 6/21/23 RA 15, mPA 26, PCW 16, CI 2.57 at LVAD speed 5800rpm. VAD speed increased to 5900 rpm.     Patient was diuresed to weight of 181 lb - PAD on CardioMEMs was 19 around this time which is below goal. Symptoms improved as well as renal function. Dobutamine was continued at 2.5 mcg/kg/min which he tolerated. Plan is for Eliseo to discharge home with continuous IV dobutamine as a means to augment diuresis, improve symptoms, and attempt to remain out of the hospital for longer perioids of time. Will continue to monitor labs as outpatient as well as CardioMEMs number. Will followup in VAD clinic in 2-4 weeks.      - Fluid status: grossly euvolemic, will discharge on torsemide 100 mg tid and hydrochlorothiazide 75 mg daily (pta dose)  - RV support/inotropy:  dobutamine @ 2.5mcg/kg/min via PICC  - ACEi/ARB/ARNi:  deferred due to renal dysfunction  - Afterload reduction:  Hydralazine 25mg TID   - BB:  Deferred due to RV dysfunction  - Aldosterone antagonist:  Deferred due to renal dysfunction  - SGLT2i:  Jardiance d/c'ed due to renal dysfunction  - SCD ppx:  ICD  - Antiplatelet: not on PTA, due to past epistaxis  - Anticoagulation: warfarin goal INR 1.7-2.3 (due to epistaxis). INR today 1.9, will d/w on  alternating 2 mg and 4 mg   - MAPs:  80s  - LDH:  stable 300s     # Chronic LVAD drive line infection, MSSA and PSA  Follows with LVAD ID, had recently been switched from cipro and daptomycin to cefepime on 5/16 d/t culture growing quinolone resistant PSA and no MSSA. Infection appears to be well-controlled currently - afebrile on admit with elevated WBC, and driveline site is without drainage, erythema, or pain on admission. WBC stable. Will discharge on IV cefepime (renally dosed 1g q24h initially, increased back to 2 g as Cr improved. Has outpatient follow-up with LVAD ID.     # Diarrhea  Reports diarrhea since admission, and that it always happens when admitted here, relieved with loperamide. Enteric panel negative 6/25. Possibly 2/2 gut edema when fluid overloaded. Prn loperamide while admitted. rec he report to VAD coordinator if symptoms persist.      # SIRS criteria  On admission, was tachycardic and WBC 14.7.  No signs of infection (BG NGTD, UA unremarkable).  Leukocytosis resolved.  Low concern for uncontrolled infection. Driveline drainage has improved since last hospitalization. Continue home cefepime, R sided PICC line.      # BERNARDA on CKD, cardiorenal, improved   Cr 3.24 on arrival, previously stable at 1.9-2.2.  BERNARDA possibly secondary to CRS/hypervolemia.  UA with microscopy showed 20 protein and small blood.  Creat has improved with diuresis and inotrope - 2 on day of discharge.     # NSVT  5 beats of polymorphic VT after starting dobutamine. Resolved. Continued amiodarone 200mg daily. Aim for K>4 Mg>2.      # Hypervolemic hyponatremia, chronic  Na 133 on admission, suspect hypervolemic hyponatremia in setting of HF. Na 135 on day of discharge.      # Liver irrhosis with portal hypertension  # Elevated AST, mild  Mildly elevated LFTs, stable.  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  No evidence of  decompensation. CT A/P (5/20) showed stable small volume ascites and mild mesenteric/retroperitoneal edema/cholelithiasis. Hepatology consulted 5/30, no concern for HE though at risk for. Ensure that he is passing 2 formed BMs daily.      OTHER/CHRONIC CONDITIONS:   Hypothyroidism:  6/27 TSH 9 free T4 1.3, continued PTA levothyroxine 100mcg qAM.   GERD:  continued PTA pantoprazole   Normocytic anemia/BELA:  Baseline 8-9, stable. Continued PTA ferrous sulfate 325mg daily.   BPH:  continued PTA finasteride 5mg daily and tamsulosin 0.8mg qPM.  DMII:  A1c 6.3 , LDSSI, hypoglycemia protocol while inpatient, diet controlled as outpatient   Gout:  Continued pta Allopurinol 200mg daily  h/o HIT:  Avoid heparin products.  ABHINAV:  Continued pta CPAP.    Siena Aguiar, CORY, ANP-C  Advanced Heart Failure/Cardiology 2 Nurse Practitioner  6/29/2023  Pager: [phone]    PHYSICAL EXAM:  Blood pressure (!) 73/60, pulse 109, temperature 97.9  F (36.6  C), temperature source Oral, resp. rate 16, weight 83.6 kg (184 lb 4.9 oz), SpO2 98 %.    GENERAL: Appears comfortable, in no distress.  HEENT: Eye symmetrical and without discharge or icterus bilaterally. Mucous membranes moist and without lesions.  NECK: Supple, JVD difficult to assess just ~3cm above clavicle at 60 degrees.   CV: +VAD hum.   RESPIRATORY: Respirations regular, even, and unlabored. Lungs CTA throughout.   GI: Soft, obese and non distended with normoactive bowel sounds present in all quadrants. No tenderness, rebound, guarding.   EXTREMITIES: No peripheral edema. Non pulsatile.   NEUROLOGIC: Alert and orientated x 3. No focal deficits.   MUSCULOSKELETAL: No joint swelling or tenderness.   SKIN: No jaundice. No rashes or lesions.     LABS:   Last CBC:   Recent Labs   Lab Test 06/26/23  0434   WBC 10.1   RBC 2.89*   HGB 8.6*   HCT 28.9*      MCH 29.8   MCHC 29.8*   RDW 21.8*          Last CMP:  Recent Labs   Lab Test 06/29/23  0736 06/29/23  0510 06/20/23  1942  06/20/23  1550   NA  --  135*   < > 133*   POTASSIUM  --  3.3*   < > 3.6   CHLORIDE  --  103   < > 99   CALOS  --  7.9*   < > 8.2*   CO2  --  21*   < > 14*   BUN  --  48.7*   < > 101.0*   CR  --  2.04*   < > 3.24*   GLC 88 110*   < > 142*   AST  --   --   --  46*   ALT  --   --   --  44   BILITOTAL  --   --   --  0.4   ALBUMIN  --   --   --  3.3*   PROTTOTAL  --   --   --  7.0   ALKPHOS  --   --   --  135*    < > = values in this interval not displayed.       IMAGING:  Results for orders placed or performed during the hospital encounter of 06/20/23   XR Chest 2 Views    Narrative    XR CHEST 2 VIEWS  6/20/2023 2:44 PM      HISTORY: SOB    COMPARISON: 5/25/2023, 4/17/2023    FINDINGS: PA and lateral views. Stable LVAD and left chest wall  automatic implantable cardiac defibrillator lead. Right arm PICC tip  projects over the right atrium. Sternotomy is intact. Stable enlarged  cardiac silhouette. Improved streaky perihilar opacities compared to  5/25/2023. No substantial pleural effusion or pneumothorax.      Impression    IMPRESSION:   No acute airspace opacities. Improved streaky perihilar  atelectasis/edema compared to 5/25/2023    I have personally reviewed the examination and initial interpretation  and I agree with the findings.    QUIQUE NICHOLS MD         SYSTEM ID:  [id number]   XR Chest Port 1 View    Narrative    XR CHEST PORT 1 VIEW  6/22/2023 4:27 PM      HISTORY: s/p picc    COMPARISON: 6/20/2023    FINDINGS: AP view. Right arm PICC tip projects over the right atrium.  Stable left chest wall implantable cardiac defibrillator and LVAD.  Median sternotomy. The cardiac silhouette is stable. Stable streaky  perihilar opacities. No pleural effusion or pneumothorax.      Impression    IMPRESSION:   1. Right arm PICC tip projects over the right atrium.  2. Stable streaky perihilar atelectasis/edema.     I have personally reviewed the examination and initial interpretation  and I agree with the  findings.    LUPE MASON MD         SYSTEM ID:  [id number]   Cardiac Catheterization    Narrative       Right sided filling pressures are moderately elevated.     Left sided filling pressures are moderately elevated.     Mild elevated pulmonary hypertension.     Cardiac Device Check - Inpatient     Value    Date Time Interrogation Session [id number]    Implantable Pulse Generator  Medtronic    Implantable Pulse Generator Model [id number] Visia AF MRI VR    Implantable Pulse Generator Serial Number [id number]    Type Interrogation Session In Clinic    Clinic Name [facility]    Implantable Pulse Generator Type Defibrillator    Implantable Pulse Generator Implant Date 20200316    Implantable Lead  Medtronic    Implantable Lead Model 6935M Sprint Quattro Secure S MRI SureScan    Implantable Lead Serial Number [id number]    Implantable Lead Implant Date 20200316    Implantable Lead Polarity Type Tripolar Lead    Implantable Lead Location Detail 1 UNKNOWN    Implantable Lead Special Function 62cm length    Implantable Lead Location Right Ventricle    Glenn Setting Mode (NBG Code) VVIR    Glenn Setting Lower Rate Limit 60    Glenn Setting Maximum Sensor Rate 115    Glenn Setting Hysterisis Rate DISABLED    Lead Channel Setting Sensing Polarity Bipolar    Lead Channel Setting Sensing Anode Location Right Ventricle    Lead Channel Setting Sensing Anode Terminal Ring    Lead Channel Setting Sensing Cathode Location Right Ventricle    Lead Channel Setting Sensing Cathode Terminal Tip    Lead Channel Setting Sensing Sensitivity 0.3    Lead Channel Setting Pacing Polarity Bipolar    Lead Channel Setting Pacing Anode Location Right Ventricle    Lead Channel Setting Pacing Anode Terminal Ring    Lead Channel Setting Sensing Cathode Location Right Ventricle    Lead Channel Setting Sensing Cathode Terminal Tip    Lead Channel Setting Pacing Pulse Width 0.4    Lead Channel Setting  Pacing Amplitude 2    Lead Channel Setting Pacing Capture Mode Adaptive    Zone Setting Type Category VF    Zone Setting Detection Interval 270    Zone Setting Detection Beats Numerator 30    Zone Setting Detection Beats Denominator 40    Zone Setting Type Category VT    Zone Setting Detection Interval Blank    Zone Setting Type Category VT    Zone Setting Detection Interval 460    Zone Setting Type Category VT    Zone Setting Detection Interval 500    Zone Setting Type Category ATRIAL_FIBRILLATION    Zone Setting Type Category AT/AF    Lead Channel Impedance Value 399    Lead Channel Impedance Value 304    Lead Channel Sensing Intrinsic Amplitude 10.25    Lead Channel Pacing Threshold Amplitude 0.75    Lead Channel Pacing Threshold Pulse Width 0.4    Battery Date Time of Measurements 20230621080845    Battery Status Middle of Service    Battery RRT Trigger 2.727    Battery Remaining Longevity 100    Battery Voltage 2.99    Capacitor Charge Type Reformation    Capacitor Last Charge Date Time 20230423073539    Capacitor Charge Time 3.743    Capacitor Charge Energy 18    Glenn Statistic Date Time Start 20230501154556    Glenn Statistic Date Time End 20230621080336    Glenn Statistic RV Percent Paced 0.36    Atrial Tachy Statistic Date Time Start 20230501154556    Atrial Tachy Statistic Date Time End 20230621080336    Atrial Tachy Statistic AT/AF [place] Percent 0.3    Therapy Statistic Recent Shocks Delivered 0    Therapy Statistic Recent Shocks Aborted 0    Therapy Statistic Recent ATP Delivered 0    Therapy Statistic Recent Date Time Start 20230501154556    Therapy Statistic Recent Date Time End 20230621080336    Therapy Statistic Total Shocks Delivered 0    Therapy Statistic Total Shocks Aborted 0    Therapy Statistic Total ATP Delivered 0    Therapy Statistic Total  Date Time Start 88925187845183    Therapy Statistic Total  Date Time End 20230621080336    Episode Statistic Recent Count 17    Episode Statistic Type  Category AT/AF    Episode Statistic Recent Count 0    Episode Statistic Type Category SVT    Episode Statistic Recent Count 0    Episode Statistic Type Category VT    Episode Statistic Recent Count 0    Episode Statistic Type Category VF    Episode Statistic Recent Count 0    Episode Statistic Type Category VT    Episode Statistic Recent Count 0    Episode Statistic Type Category VT    Episode Statistic Recent Count 0    Episode Statistic Type Category VT    Episode Statistic Recent Date Time Start 92601109143941    Episode Statistic Recent Date Time End 70702192041011    Episode Statistic Recent Date Time Start 41444477598075    Episode Statistic Recent Date Time End 90260184643457    Episode Statistic Recent Date Time Start 91572301305415    Episode Statistic Recent Date Time End 35653479969373    Episode Statistic Recent Date Time Start 24785301272539    Episode Statistic Recent Date Time End 56882229013865    Episode Statistic Recent Date Time Start 16476574437818    Episode Statistic Recent Date Time End 93560916945641    Episode Statistic Recent Date Time Start 46463279794814    Episode Statistic Recent Date Time End 88514074406781    Episode Statistic Recent Date Time Start 08133780500777    Episode Statistic Recent Date Time End 41958998177291    Episode Statistic Total Count 259    Episode Statistic Type Category AT/AF    Episode Statistic Total Count 0    Episode Statistic Type Category SVT    Episode Statistic Total Count 0    Episode Statistic Type Category VT    Episode Statistic Total Count 0    Episode Statistic Type Category VF    Episode Statistic Total Count 0    Episode Statistic Type Category VT    Episode Statistic Total Count 0    Episode Statistic Type Category VT    Episode Statistic Total Count 2    Episode Statistic Type Category VT    Episode Statistic Total Date Time Start 91399213029796    Episode Statistic Total Date Time End 28487478727527    Episode Statistic Total Date Time Start  67280124425281    Episode Statistic Total Date Time End 82799706387137    Episode Statistic Total Date Time Start 74260517208638    Episode Statistic Total Date Time End 45585687662908    Episode Statistic Total Date Time Start 04078184737855    Episode Statistic Total Date Time End 07947399363127    Episode Statistic Total Date Time Start 49102295196307    Episode Statistic Total Date Time End 98976705263552    Episode Statistic Total Date Time Start 25808698551021    Episode Statistic Total Date Time End 46465090009133    Episode Statistic Total Date Time Start 45404871341918    Episode Statistic Total Date Time End 56418824918156    Episode Identifier 261    Episode Type Category Monitor    Episode Date Time 74158844794635    Episode Duration 360    Episode Identifier 260    Episode Type Category Monitor    Episode Date Time 14849871242905    Episode Duration 480    Episode Identifier 259    Episode Type Category Monitor    Episode Date Time 57371070517541    Episode Duration 360    Episode Identifier 258    Episode Type Category Monitor    Episode Date Time 05494939435026    Episode Duration 600    Episode Identifier 257    Episode Type Category Monitor    Episode Date Time 15428146304938    Episode Duration 360    Episode Identifier 256    Episode Type Category Monitor    Episode Date Time 46878575817265    Episode Duration 960    Episode Identifier 255    Episode Type Category Monitor    Episode Date Time 48286498079215    Episode Duration 720    Episode Identifier 254    Episode Type Category Monitor    Episode Date Time 06986532288228    Episode Duration 360    Episode Identifier 253    Episode Type Category Monitor    Episode Date Time 29701710891244    Episode Duration 600    Episode Identifier 252    Episode Type Category Monitor    Episode Date Time 97040674294923    Episode Duration 360    Episode Identifier 251    Episode Type Category Monitor    Episode Date Time 70467691806397    Episode Duration 480     Episode Identifier 250    Episode Type Category Monitor    Episode Date Time 50668707236542    Episode Duration 480    Episode Identifier 249    Episode Type Category Monitor    Episode Date Time 38666416150645    Episode Duration 1,800    Episode Identifier 248    Episode Type Category Monitor    Episode Date Time 77930905160782    Episode Duration 1,200    Episode Identifier 247    Episode Type Category Monitor    Episode Date Time 02445669372911    Episode Duration 1,560    Episode Identifier 246    Episode Type Category Monitor    Episode Date Time 14270723585727    Episode Duration 360    Episode Identifier 245    Episode Type Category Monitor    Episode Date Time 87753270007322    Episode Duration 480    Narrative    Patient seen on U [address] [place] [address] for evaluation and iterative programming of their ICD per MD orders.     Device: Medtronic [id number] Visia AF MRI VR  Normal device function.  Mode: VVI 40 bpm  : 0.4%  Intrinsic rhythm:  bpm  Thoracic Impedance: Below reference line, suggests possible intrathoracic fluid accumulation.  Short V-V intervals: 0  Lead Trends Appear Stable:.  Estimated battery longevity to RRT = 8.3 years. Battery voltage = 3.01 V.   Atrial Arrhythmia: 17 AT/AF episodes detected, longest episode 30 min, Avg V rate 100 bpm.  AF [place]: 0.3%  Anticoagulant: Warfarin  Ventricular Arrhythmia: None  Setting Changes: None    Plan: Continue to follow patient throughout hospitalization then, device follow-up every 3 months.    Candis Gaytan RN    Single lead ICD    I have reviewed and interpreted the device interrogation, settings, programming and nurse's summary. The device is functioning within normal device parameters. I agree with the current findings, assessment and plan.     *Note: Due to a large number of results and/or encounters for the requested time period, some results have not been displayed. A complete set of results can be found in Results Review.       PROCEDURES:  Right  heart catheterization     CONSULTATIONS:   Pharmacy  Care coordinator  Social work      DISCHARGE MEDICATIONS:  Current Discharge Medication List      START taking these medications    Details   DOBUTamine 500 mg in dextrose 5% 250 mL (adult std conc) premix Inject 212 mcg/min into the vein continuous  Qty: 1000 mL, Refills: 0    Associated Diagnoses: 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); LVAD (left ventricular assist device) present (H)      hydrALAZINE (APRESOLINE) 25 MG tablet Take 1 tablet (25 mg) by mouth every 8 hours  Qty: 90 tablet, Refills: 3    Associated Diagnoses: LVAD (left ventricular assist device) present (H)      hydrochlorothiazide (HYDRODIURIL) 25 MG tablet Take 3 tablets (75 mg) by mouth daily  Qty: 30 tablet, Refills: 0    Associated Diagnoses: LVAD (left ventricular assist device) present (H)      loperamide (IMODIUM) 2 MG capsule Take 1 capsule (2 mg) by mouth 4 times daily as needed for diarrhea  Qty: 20 capsule, Refills: 0    Associated Diagnoses: Diarrhea due to malabsorption         CONTINUE these medications which have CHANGED    Details   ceFEPIme (MAXIPIME) 2 g vial Inject 1 g into the vein every 24 hours      pantoprazole (PROTONIX) 40 MG EC tablet Take 1 tablet (40 mg) by mouth 2 times daily (before meals)      potassium chloride ER (KLOR-CON M) 20 MEQ CR tablet Take 100mEq in the morning, Take 100 mEq in the afternoon, Take 80 mEq in the PM  Qty: 774 tablet, Refills: 3    Associated Diagnoses: 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); LVAD (left ventricular assist device) present (H)         CONTINUE these medications which have NOT CHANGED    Details   allopurinol (ZYLOPRIM) 100 MG tablet Take 2 tablets (200 mg) by mouth daily  Qty: 60 tablet, Refills: 1    Associated Diagnoses: LVAD (left ventricular assist device) present (H)      amiodarone (PACERONE) 200 MG tablet TAKE 1 TABLET BY MOUTH ONCE DAILY  Qty: 90 tablet, Refills: 3    Associated  Diagnoses: LVAD (left ventricular assist device) present (H)      co-enzyme Q-10 200 MG CAPS Take 200 mg by mouth daily      ferrous sulfate (FEROSUL) 325 (65 Fe) MG tablet Take 1 tablet (325 mg) by mouth daily (with breakfast)  Qty: 90 tablet, Refills: 3    Associated Diagnoses: Iron deficiency anemia, unspecified iron deficiency anemia type      finasteride (PROSCAR) 5 MG tablet Take 1 tablet (5 mg) by mouth daily Helps with urinary retention.  Qty: 30 tablet, Refills: 0    Associated Diagnoses: Voiding difficulty      levothyroxine (SYNTHROID/LEVOTHROID) 100 MCG tablet Take 1 tablet (100 mcg) by mouth every morning (before breakfast)  Qty: 30 tablet, Refills: 0    Associated Diagnoses: Hypothyroidism, unspecified type      magnesium oxide (MAG-OX) 400 MG tablet Take 1 tablet (400 mg) by mouth 3 times daily  Qty: 90 tablet, Refills: 3    Associated Diagnoses: LVAD (left ventricular assist device) present (H)      torsemide (DEMADEX) 100 MG tablet Take 1 tablet (100 mg) by mouth 3 times daily  Qty: 270 tablet, Refills: 3    Associated Diagnoses: Acute on chronic congestive heart failure, unspecified heart failure type (H); LVAD (left ventricular assist device) present (H)      warfarin ANTICOAGULANT (COUMADIN) 4 MG tablet Take 2 mg by mouth on Wednesday, take 4 mg by mouth all other days    Associated Diagnoses: LVAD (left ventricular assist device) present (H); Chronic systolic congestive heart failure (H)      QUEtiapine (SEROQUEL) 25 MG tablet Take 0.5 tablets (12.5 mg) by mouth At Bedtime  Qty: 30 tablet, Refills: 3    Associated Diagnoses: Other insomnia      tamsulosin (FLOMAX) 0.4 MG capsule Take 0.8 mg by mouth every evening This med helps with urinary retention  Qty: 30 capsule, Refills: 0    Associated Diagnoses: Voiding difficulty         STOP taking these medications       empagliflozin (JARDIANCE) 10 MG TABS tablet Comments:   Reason for Stopping:         senna-docusate (SENOKOT-S/PERICOLACE) 8.6-50 MG  tablet Comments:   Reason for Stopping:               DISCHARGE DISPOSITION: Eliseo Tanner will discharge to home in stable condition.     DISCHARGE INSTRUCTIONS:  Discharge Procedure Orders   Medication Therapy Management Referral   Referral Priority: Routine Referral Type: Med Therapy Management   Requested Specialty: Pharmacist   Number of Visits Requested: 1     Home Infusion Referral   Referral Priority: Routine: Next available opening Referral Type: Consultation   Number of Visits Requested: 1     Follow-Up with Cardiology [facility] Heart Failure Discharge   Standing Status: Future   Referral Priority: Urgent: 3-5 Days Referral Type: Consultation   Requested Specialty: Cardiovascular Disease   Number of Visits Requested: 1     OPAT enrollment and ID Clinic Referral   Referral Priority: Routine Referral Type: Consultation   Number of Visits Requested: 1     Reason for your hospital stay   Order Comments: Fluid overload     Activity   Order Comments: Your activity upon discharge: activity as tolerated     Order Specific Question Answer Comments   Is discharge order? Yes      Follow Up and recommended labs and tests   Order Comments: [facility]   Phone: [phone]   Fax: [phone]     For resumption of outpatient IV antibiotics, weekly PICC line dressing changes and labs as ordered.    Labs to be drawn: Weekly CBC with diff and twice weekly CMP.     Diet   Order Comments: Follow this diet upon discharge: Orders Placed This Encounter      Fluid restriction 2000 ML FLUID      Snacks/Supplements Adult: Other; If pt asks for a snack or supplement, please send; With Meals      2 Gram Sodium Diet     Order Specific Question Answer Comments   Is discharge order? Yes        60 minutes spent in discharge, including >50% in counseling and coordination of care, medication review and plan of care recommended on follow up. Questions were answered, patient agrees to plan.

## 2023-06-29 NOTE — PHARMACY
[facility]  Parenteral ANtibiotic Review at Departure from Acute Care [facility] Note     Patient: Eliseo Tanner  MRN: [id number]  Allergies: Heparin, Chlorhexidine, and Oxycodone    Current Location:  6C  OPAT to be provided by: Jonathan    [facility] ([phone])  Line Type: PICC    Diagnosis/Indications: Chronic LVAD driveline infection  Organism(s): Pseudomonas aeruginosa  MRDO? No  Pending Cultures/Microbiological Tests: no      Inpatient ID involved in developing OPAT plan: Yes - discharge OPAT plan has changes from when ID provider, Dr. Juan C Ayoub, last evaluated OPAT plan on 5/31/23, changes are reflected below.    Outpatient ID Follow-up: ID OPAT Clinic Referral Placed ([facility] Ph: [phone] and Fax: [phone]) - appointment scheduled  Designated Provider: Dr. Negar Bhatti    Antimicrobial Regimen / Route Anticipated  Duration Start Date Stop /  Reassess Date   Cefepime 2 grams IV every 24 hours TBD 5/30/2023 TBD at outpatient ID follow-up   Note: Cefepime changed from 2g IV q24h to 1g IV q24h on admission given BERNARDA on CKD. Recommended transitioning back to PTA dose of cefepime 2g IV q24h.     Laboratory Tests and Monitoring Frequency: CBC with Diff, CMP Once Weekly    Imaging/Miscellaneous Monitoring: N/A    ID Pharmacist Interventions: Dose Change  - Discussed with unit pharmacist, primary team, and ID physician. Dose changed to 1 g IV q24h on admission given BERNARDA on CKD. Discharge orders entered for 1 g IV q24h given renal function not fully improved to baseline. Recommended changing cefepime 1g IV q24h to cefepime 2g IV q24h given dosing acceptable for current renal function and concern for developing resistance.    Maciel Hahn, PharmD, MPH  PGY2 ID Pharmacy Resident  Pager: [phone]

## 2023-06-29 NOTE — PHARMACY-ANTICOAGULATION SERVICE
Clinical Pharmacy- Warfarin Discharge Note  This patient is currently on warfarin for the treatment of LVAD.  INR Goal= 1.7-2.3  Expected length of therapy lifetime.      Anticoagulation Dose History  More data exists       Latest Ref Rng & Units 6/23/2023 6/24/2023 6/25/2023 6/26/2023   Recent Dosing and Labs   warfarin ANTICOAGULANT (COUMADIN) tablet 2 mg - - - 2 mg, $Given 2 mg, $Given   warfarin ANTICOAGULANT (COUMADIN) half-tab 1.5 mg - 1.5 mg, $Given 1.5 mg, $Given - -   warfarin ANTICOAGULANT (COUMADIN) tablet 3 mg - - - - -   warfarin ANTICOAGULANT (COUMADIN) tablet 4 mg - - - - -   INR 0.85 - 1.15 2.19  2.22  2.09  2.06            6/27/2023 6/28/2023 6/29/2023   Recent Dosing and Labs   warfarin ANTICOAGULANT (COUMADIN) tablet 2 mg - - -   warfarin ANTICOAGULANT (COUMADIN) half-tab 1.5 mg - - -   warfarin ANTICOAGULANT (COUMADIN) tablet 3 mg 3 mg, $Given - -   warfarin ANTICOAGULANT (COUMADIN) tablet 4 mg - 4 mg, $Given -   INR 1.98  1.87  1.98        Vitamin K doses administered during the last 7 days: 0      A/P: As discussed with the team, recommend discharging the patient on a warfarin regimen of 2 mg Monday, Wednesday, Friday and 4 mg all other days of the week. The patient should have an INR checked 7/3.    Lauren Lacey, PharmD  PGY-1 Pharmacy Resident

## 2023-06-29 NOTE — PROGRESS NOTES
Goal outcome evaluation:  Time: 1900 - 0700  Plan of care reviewed with: Patient  Overall patient progress: No change  VS BP (!) 73/60   Pulse 63   Temp 98.3  F (36.8  C) (Oral)   Resp 18   Wt 83.3 kg (183 lb 11.2 oz)   SpO2 98%   BMI 28.77 kg/m        Activity: Up with SBA  Neuros: Alert and oriented to person, place, situation and time. Able to make needs known. Clear and appropriate speech. Follows instructions.  Psychosocial Assessment: Cooperative and interacts appropriately with staff. R  Cardiac: Tachycardic. SR. Peripheral pulses are palpable and equal bilaterally.  Respiratory: MIRANDA, RR WNL, LSC  GI/: Reports no nausea, vomiting, or abdominal pain. Bowel sounds are present in all quadrants, last BM 6/28/23. Reports no difficulty or pain with urination.   Diet: 2 gram sodium diet, no complaints of nausea, vomiting or abdominal discomfort.  Skin/Incisions: Skin is warm and dry, generalized bruising, driveline dressing CDI, no new deficits noted.  Lines/Drains: R Double Lumen PICC, infusing dobutamine and TKO.  Labs: BG @ 2200 187  Pain/Nausea: No c/o of pain or nausea.  New changes this shift: Cards resident notified of bigeminal rhythm.   Plan: Continue POC, notify provider of changes

## 2023-06-30 NOTE — PROGRESS NOTES
[facility]    Background: Transitional Care Management program identified per system criteria and reviewed by [facility] team for possible outreach.    Assessment: Upon chart review, CCR Team member will not proceed with patient outreach related to this episode of Transitional Care Management program due to reason below:    Patient has active communication with a nurse, provider or care team for reason of post-hospital follow up plan.  Outreach call by CCR team not indicated to minimize duplicative efforts.     Plan: Transitional Care Management episode addressed appropriately per reason noted above.      Ana Davis RN  [facility]    *Connected Care Resource Team does NOT follow patient ongoing. Referrals are identified based on internal discharge reports and the outreach is to ensure patient has an understanding of their discharge instructions.

## 2023-06-30 NOTE — PROGRESS NOTES
D:  Called pt to check in 1 day post discharge.  Pt reports feeling well. VAD parameters stable, weight 184lbs today, which is also stable. Pt reports fatigue from ride home yesterday, but overall improvement in stamina and energy since starting dobutamine.   I:  Discussed follow up, twice weekly BMPs, monthly appointments and medication list.  Pt very confused on what he is to take this morning.  Over 30 minutes spent working on meds to take this morning.  Called pt's pharmacy and updated his medication list, in order to send out new pill packets that reflect pt's current med regime.  Pharmacy to contact pt to further discuss.  A:  Post hospital follow up  P:  Pt verbalized understanding of the instructions given.  Will call VAD coordinator with further needs and questions.

## 2023-07-03 NOTE — PROGRESS NOTES
[facility] - CardioMEMS Reading Review    July 3, 2023, 1316   CardioMems period: 6/17/2023 - 7/16/2023      CardioMEMS reviewed and routed to patient's provider, Francoise RENDON NP.     Current diuretic:  Torsemide 100 TID, Hydrochlorothiazide 75mg daily  Current potassium chloride dose:  Potassium 100mEq TID    Symptoms: Denies heart failure symptoms except fatigue, which is baseline for him.  Weights: (Dry weight ~180 lbs) Today, going back: [phone]  Labs:      Changes to plan of care today: NO changes    Current Threshold parameters: 21 - 24    Today's Waveform:       Readings:         RHC Date Wedge Pressure MEMS PAD during cath  (average of the 3 values)     Sept 20, 2022 w/MEMS implant     15 mmHg   16 mmHg

## 2023-07-06 NOTE — PROGRESS NOTES
[facility] - CardioMEMS Reading Review     July 6, 2023  CardioMems period: 6/17/2023 - 7/16/2023     CardioMEMS reviewed and routed to patient's provider, Francoise RENDON NP.      Current diuretic:  Torsemide 100mg TID, Hydrochlorothiazide 75mg daily  Current potassium chloride dose:  Potassium 100mEq TID     Symptoms: Patient denies shortness of breath, dizziness, lightheadedness, chest pain, palpitations, swelling. Pt endorses fatigue, which is baseline for him.   Weights:   7/6/23 - 184 #  7/5/23 - 183 #  7/4/23 - 183 #    Labs:  Chemistry             Changes to plan of care today: NO changes     Current Threshold parameters: 21 - 24     Today's Waveform:        Readings:           [facility] Date Wedge Pressure MEMS PAD during cath  (average of the 3 values)      Sept 20, 2022 w/MEMS implant       15 mmHg    16 mmHg

## 2023-07-10 NOTE — PROGRESS NOTES
[facility] CORE Clinic - CardioMEMS Reading Review    July 10, 2023, 1336   CardioMems period: 6/17/2023 - 7/16/23      CardioMEMS reviewed and routed to patient's provider, Francoise RENDON NP.     Current diuretic:  Torsemide 100 TID, Hydrochlorothiazide 75mg daily  Current potassium chloride dose:  Potassium 100mEq TID    Symptoms: Ongoing, unchanged fatigue  Weights: (Dry weight ~180 lbs) Today, going back: 184, 185, 184, 184  Labs today:      Changes to plan of care today: No changes, continue to monitor.    Current Threshold parameters: 21 - 24    Today's Waveform:       Readings:         RHC Date Wedge Pressure MEMS PAD during cath  (average of the 3 values)     Sept 20, 2022 w/MEMS implant     15 mmHg   16 mmHg

## 2023-07-10 NOTE — PROGRESS NOTES
ANTICOAGULATION MANAGEMENT     Eliseo Tanner 68 year old male is on warfarin with subtherapeutic INR result. (Goal INR 1.7-2.3)    Recent labs: (last 7 days)     07/06/23  1002   INR 1.5*       ASSESSMENT       Source(s): Chart Review       Warfarin doses taken: Reviewed in chart    Diet: No new diet changes identified    Medication/supplement changes: None noted    New illness, injury, or hospitalization: Yes: CC note from today reviewed, rising creatinine    Signs or symptoms of bleeding or clotting: No    Previous result: Therapeutic last 2(+) visits    Additional findings: result received in [facility] today, 7/10         PLAN     Recommended plan for no diet, medication or health factor changes affecting INR     Dosing Instructions: booster dose then continue your current warfarin dose with next INR in 1 week       Summary  As of 7/10/2023    Full warfarin instructions:  7/10: 4 mg; Otherwise 2 mg every Mon, Wed, Fri; 4 mg all other days   Next INR check:  7/13/2023             Detailed voice message left for Eliseo and spouse with dosing instructions and follow up date.   Sent Brandle message with dosing and follow up instructions    Patient using outside facility for labs    Education provided:     Please call back if any changes to your diet, medications or how you've been taking warfarin    Goal range and lab monitoring: goal range and significance of current result and Importance of following up at instructed interval    Contact [phone] with any changes, questions or concerns.     Plan made per [facility] anticoagulation protocol and per LVAD protocol    PACO MARQUEZ RN  Anticoagulation Clinic  7/10/2023    _______________________________________________________________________     Anticoagulation Episode Summary     Current INR goal:  1.7-2.3   TTR:  46.7 % (8.1 mo)   Target end date:  Indefinite   Send INR reminders to:  ANTICOAG LVAD    Indications    LVAD (left ventricular assist device) present (H)  [Z95.811]  Chronic systolic congestive heart failure (H) [I50.22]  Paroxysmal atrial fibrillation (H) [I48.0]           Comments:  Follow VAD Anticoag protocol:Yes: HeartMate 3   Bridging: Call MD each time   Date VAD placed: 2/18/2020 5/6/22 [employer]         Anticoagulation Care Providers     Provider Role Specialty Phone number    Nader Cisneros MD Referring Cardiovascular Disease [phone]            rising creatinine

## 2023-07-13 NOTE — PROGRESS NOTES
-------------------------------------------------------------------------------------------------  We cannot exclude the possibility you may still have COVID-19.  You were tested for COVID today, results will be available in the next 2 days, you will be called with final results as they become available.  I recommend that you check [facility] frequently as results will also be in the [facility] site.   Your illness is potentially contagious.  1. Please use careful hand hygiene - washing your hands for 20 seconds with soap and water and/or using an alcohol-based hand   2. Avoid touching your face, cover your cough and sneezes with a tissue and discard it, or cough or sneeze into your sleeve.    3. Maintain at least 6 ft of distance between you and other people.    4. Remain in isolation at home until recommendations below have been met  In addition,  The public health department recommends public isolation to be discontinued ONLY when the following have occurred:  1. A negative COVID test and severe symptoms and fever has resolved for at least 24 hours OR  2. 10 days has elapsed from the day of first exposure and without symptoms OR   3. if positive covid test results, 10 days has elapsed from when first experienced symptoms AND you have been free of fever, productive cough and other acute symptoms of respiratory infection for 24 hours.  4. If longer period of time required off, reevaluation is required.    If you have any further questions or concerns regarding COVID-19, please call the COVID-19 Hotline number at [phone].    IF YOUR COVID TEST IS POSITIVE YOU WILL NEED TO BE IN ISOLATION THROUGH 2/28/2021    Follow-up:  With PRIMARY CARE PROVIDER if symptoms worsen or persist for more than 10 days.    Seek immediate medical attention in the Emergency Department or Call 911 if symptoms significantly worsen, such as respiratory distress/trouble  [facility] Infectious Disease Clinic Note:  Follow up patient     Patient:  Eliseo Tanner, Date of birth 1955, Medical record number [id number]  Date of Visit:  07/13/2023   Start at 8.28 and end at 8:41         Assessment and Recommendations:   Recommendations:  1. Continue renally dosed cefepime 2 grams IV q 24 hours (covers MSSA and PsA) as he is doing very well with hardly any driange.  He continues to go to an infusion center daily for cefepime.  2. Cefepime dose will need to be adjusted for his renal dysfunction. Estimated Creatinine Clearance: 35.1 mL/min (A) (based on SCr of 2.04 mg/dL (H)). As his creatinine clearance around ~ 30. Although it fluctuates above 30 given the variability of it with frequent diuretic dose changes will keep at daily intervals. If creatinine clearance were to drop < 11 then the dose would need to be lowered.  3. Anticipate a indefinite duration    4. Pending his clinical status we could consider phage therapy in the future - 5/27/23 culture negative. We need culture positivity to develop phages.  5. Weekly CBC with diff, CMP. Last labs on 7/10 are ok, Cr is up slightly. Encourage hydration.   6. Follow up in LVAD clinic in 8 weeks.     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      LVAD History:  Current LVAD model: History of NICM s/p HM III  Date current LVAD placed: 2/18/20  Previous LVAD devices: none  Other prosthetic devices/materials:  ICD 3/16/20, Mems 8/2022  Primary cardiologist: Dr. Cisneros  Primary ID provider: Magy     History of bacteremias (dates and organisms):   --History of pre-op Proteus and Enterococcus bacteremia  --MSSA bacteremia secondary to MSSA driveline infection (11/2020). Previously on cefadroxil or cephalexin for suppression.     History of driveline  breathing      -----------------------------------------------------------------------------------------------      What is the Covid-19 Coronavirus?        Covid-19 is a new strain of the common cold virus called Coronavirus.  It has spread quickly around the world and has been found in most countries.  It has been considered a national emergency due to its fast spread and ability to make older people and those with diabetes, lung disease, and heart disease very sick.    What are the symptoms?    Fever, Cough, and Shortness of Breath are the 3 most common symptoms most patients with Covid-19 are experiencing.  Usually symptoms start 2-14 days after exposure to the virus.  Other symptoms can be similar to the common cold such as nasal congestion, runny nose, headache, and sore throat.     When should I seek medical care?  If you or your loved one is experiencing only the main three symptoms mentioned above, call the [facility] hotline at [phone] for advice and direction on what to do next.  Most young and healthy people with mild symptoms are able to stay home and treat symptomatically with the below treatment recommendations.  Most people will not need to be tested and our hotline and clinics can help you determine if you need testing.      If you or a loved one experiences any of the following emergency warning signs seek care at your nearest emergency department.  • Difficulty breathing or shortness of breath that doesn't improve  • Persistent pain or pressure in the chest  • New confusion or inability to wake up  • Blue lips or face    How do I get it and how do I prevent it?  We believe the virus is spread from person to person in close contact (within 6 feet) via respiratory droplets from a sneeze or cough.  Kissing and touching infected surfaces with hand-to-mouth activity afterwards may also help spread the virus.      There is no vaccine to help prevent this virus.  Therefore, good hygiene,  infections (dates and organisms):   --Polymicrobial chronic LVAD driveline infection: MSSA driveline infection with hx of breakthrough on cefadroxil and cephalexin. Increased drainage and fluid collection in 12/2022 s/p I&D of fluid 12/11/22; 12/11/22 Cx grew MSSA (tetra-R). 12/7 outside culture with both MRSA and MSSA. He was placed on daptomycin and attempted to transition to Bactrim suppression in 3/2023 but developed BERNARDA so has remained on Daptomycin. 3/2/23 cx grew Pseudomonas x2 (pan-S) and MSSA x2 strains (both R: tetracycline; 1 with high daptomycin DONTA). Ciprofloxacin was added to daptomycin due to increased drainage. 3/24/23 cultures grew Pseudomonas x3 strains (all FQ resistant). Although cipro resistant Pseudomonas but since he had clinically improved cipro was continued. CT (4/23/23) with soft tissue thickening along driveline but no abscess (no surgical intervention).  5/11/23 cx again grew 2 strains of PsA resistant to FQ. Changed cipro to renally dosed cefepime. Since MRSA was not isolated and previously was only isolated once (12/2022) we discontinued daptomycin. Last culture on 5/27/23 did not have growth.      Hospitalized 5/20-5/31 after a fall and drop in Hgb. Colonoscopy and EGD performed 5/22/23. Numerous polyps found. Required transfusions. Cefepime was stopped transiently due to AMS but then resumed. Last seen by ID on 5/31/23. Resumed cefepime after it was determined that it was not likely the cefepime causing AMS. His CT abd on 5/20 did not indicate an abd infection.     History of other pertinent infections:   --Hx severe Legionella pneumonia (serogroup 1L) c/b cardiogenic shock, renal failure requiring CRRT, and resp failure requiring intubation, s/p azithromycin     History of driveline area irritation and current mitigation strategies:  local wound care center using vashe nzt 5.5 wound solution - soaking a hydrofera blue sponge and covering with mepilex. Changing dressing daily.       Current suppressive antibiotics: cefepime  Previous antibiotic failures/allergies/intolerances etc: cephalexin, cefadroxil, daptomycin, ciprofloxacin        Interval history :   He was seen by ID on 6/6/23 and was doing well with less drainage and was feeling well. He was continued on Cefepime.He was admitted from 6/20/2023 to 6/29/2023 due to fluid overload state from heart failure. He was placed on dobutamine and was discharged home on IV continuous dobutamine and his VAD rate was increased.     He is here for follow up over video. Since his admission, he states that he has been doing well. He has no complaints. He is taking cefepime. He has not missed any doses. He seems upset about the daily infusion of abx. He has no wound vac and the wound is looking great per pt with no drainage. He has no pain around the site. No fevers, no chills, no sob, no chest pain, no abd pain, n/v/d or dysuria. Of note, he was hoping for an oral antibiotic for the PsA but limited options exist.     Transplants:  N/A; Postoperative day:  .  Coordinator Luiza Mcneil    Review of Systems:  CONSTITUTIONAL:  No fevers or chills. No night sweats.  EYES: negative for icterus or acute vision changes.   ENT:  negative for hearing loss, tinnitus or sore throat  RESPIRATORY:  negative for cough, sputum, dyspnea  CARDIOVASCULAR:  negative for chest pain, heart palpitations  GASTROINTESTINAL:  negative for nausea, vomiting, diarrhea or constipation  GENITOURINARY:  negative for dysuria or hematuria.  HEME:  No easy bruising or bleeding  INTEGUMENT:  negative for rash or pruritus  NEURO:  Negative for headache or tremor.    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)     MSSA     Legionella pneumonia (H)      LVAD (left ventricular assist  isolation, and living a healthy lifestyle is the best way to prevent infection as well as severe symptoms. Below are ways you can help prevent infection and the spread of the virus:  • Wash your hands (soap and water) regularly, especially before meals and after coming in contact with someone  • Cover your cough's and sneezes with your elbow or a tissue  • Avoid contact with people who are showing the common cold symptoms mentioned above  • Avoid large gatherings of 25+ people and only plan for necessary trips to stores and public places  • Quit smoking tobacco and limit alcohol use to a drink per day maximum due to the immune suppression of these habits  • Walk outside (if possible) or get other low intensity exercise daily as this has been shown to boost immunity  • Eat 2-3 fruits, 5-7 vegetables, a 1/2 cup of legumes, a cup of whole grains, and a handful of nuts every day along with 3-4oz of lean meat or fish every few days.  Limit sugar-filled, processed, and fried foods.  This approach to nutrition has shown to support the immune system best.    • Drink 60-75oz of water daily and avoid sodas, sweetened teas, juices, and other sugar containing drinks which have been shown to suppress the immune system  • Sleep 7-8 hours every night.  If you have trouble falling or staying asleep, discuss further with your primary care doctor.    ________________________________________________________________________________________________________________________________________  What is the treatment for Covid-19?  There is no specific treatment for this Covid-19 infection other than supportive treatments we recommend for the common cold.  Currently there is some concern over treating a low-grade fever (under 102F), so it might be best to allow the fever to take its course as long as you or your loved one can manage the discomfort.  Integrative treatments that can help improve symptoms and potentially reduce severity of the  device) present (H)      MSSA bacteremia 11/2020       Past Surgical History:   Procedure Laterality Date     ANESTHESIA CARDIOVERSION N/A 02/28/2020    Procedure: ANESTHESIA, FOR CARDIOVERSION;  Surgeon: GENERIC ANESTHESIA PROVIDER;  Location: [facility] OR     COLONOSCOPY N/A 05/22/2023    Procedure: COLONOSCOPY, WITH POLYPECTOMY AND BIOPSY;  Surgeon: Myles Mota DO;  Location: [facility] GI     CV CARDIOMEMS WITH RIGHT HEART CATH N/A 09/20/2022    Procedure: Pulmonary Arterial Pressure Sensor Placement CPT Codes to be cleared by financial securing for this implant. 50644 and ;  Surgeon: Dalton Baeza MD;  Location: [facility] HEART CARDIAC CATH LAB     CV CENTRAL VENOUS CATHETER PLACEMENT N/A 02/13/2020    Procedure: Central Venous Catheter Placement;  Surgeon: Chente Moss MD;  Location: [facility] HE[facility] CARDIAC CATH LAB     CV INTRA AORTIC BALLOON N/A 02/07/2020    Procedure: Intra-Aortic Balloon Pump Insertion;  Surgeon: Jose Baldwin MD;  Location: [facility] HE[facility] CARD[facility]AC [facility] LAB     CV INTRA AORTIC BALLOON N/A 02/13/2020    Procedure: Intra-Aortic Balloon Pump Insertion;  Surgeon: Chente Moss MD;  Location: [facility] HE[facility] CARD[facility] LAB     CV RIGHT HEART CATH MEASUREMENTS RECORDED N/A 09/21/2020    Procedure: CV RIGHT HEART CATH;  Surgeon: Dalton Baeza MD;  Location: [facility] CARD[facility] LAB     CV RIGHT HEART CATH MEASUREMENTS RECORDED N/A 01/07/2021    Procedure: Right Heart Cath;  Surgeon: Chun Ball MD;  Location: [facility] CARD[facility]AC CATH LAB     CV RIGHT HEART CATH MEASUREMENTS RECORDED N/A 02/10/2022    Procedure: CV RIGHT HEART CATH;  Surgeon: Dalton Baeza MD;  Location: [facility] HE[facility] CARD[facility] LAB     CV RIGHT HEART CATH MEASUREMENTS RECORDED N/A 09/20/2022    Procedure: Right Heart Catheterization [[id number]];  Surgeon: Dalton Baeza MD;  Location: [facility] CARD[facility]ATH LAB     CV RIGHT HEART CATH MEASUREMENTS RECORDED N/A 12/12/2022    Procedure: Right Heart  illness include:  • Normal Saline Nasal Sprays/Sinus Rinse/Neti Pot: Use 1 to 2 times per day to help with nasal congestion, dryness, postnasal drip, and runny nose. Ask your primary care provider for more information on these rinses.   • Humidifiers/Vaporizers: Adds moisture to the air, which helps ease coughing and congestion. Mucus tends to pull in the extra moisture, which thins it out and makes it easier to expel from the body.  Adding essential oils to a diffuser is often helpful, try Eucalyptus and Tea Tree Oil.  • Vicks VapoRub, Mentholatum: Contain a mixture of camphor, menthol, and eucalyptus for cough and congestion. When these products are inhaled, they create a local anesthetic sensation and a sense of improved airflow.   • Warm Steam Showers/Baths: The warm mist from the steamy bathroom relaxes the airways, loosens mucus, and allows for easier breathing. A nice big pot of soup on the stove helps too!   • Honey: Helps to relieve cough and relieves throat irritation. Use 1/2 to 2 teaspoons (tsp).  It's great to add to hot (decaffeinated) tea=more humidity! Warning:  Do not give honey to children under one year old.   • Gargle with warm salt water: Helps relieve sore throat.  Mix 1/4 to 1/2 teaspoon (tsp) salt with 1 cup (8 ounces) of warm water.   • Vitamin D3: The vitamin is needed to boost our immune system and help our bodies fight off infection.  Extra supplements during the winter and for prevention of illness.  For Prevention try 5,000-10,000 international units daily. If however, you are experiencing Covid-19 symptoms mentioned above, it is currently recommended you STOP Vitamin D supplementation due to potential to worsen symptoms.  • Use Gravity: Elevating the head above the heart helps decrease congestion, which is primarily caused by swollen blood vessels in the lining of the nose.  • Consider Other Supplements:   o Vitamin C: 500-3000mg daily  o Probiotics: 30-50 Billion live organisms daily  Cath;  Surgeon: Chente Moss MD;  Location:  HEART CARDIAC CATH LAB     CV RIGHT HEART CATH MEASUREMENTS RECORDED N/A 04/28/2023    Procedure: Right Heart Catheterization;  Surgeon: Aaliyah Fischer MD;  Location:  HEART CARDIAC CATH LAB     CV RIGHT HEART CATH MEASUREMENTS RECORDED N/A 06/21/2023    Procedure: Right Heart Catheterization;  Surgeon: Nader Cisneros MD;  Location:  HEART CARDIAC CATH LAB     CV SWAN LUCIANA PROCEDURE N/A 02/13/2020    Procedure: [place] Procedure;  Surgeon: Chente Moss MD;  Location:  HEART CARDIAC CATH LAB     EP ICD Bilateral 03/16/2020    Procedure: EP ICD;  Surgeon: Dali Day MD;  Location:  HEART CARDIAC CATH LAB     ESOPHAGOSCOPY, GASTROSCOPY, DUODENOSCOPY (EGD), COMBINED N/A 05/22/2023    Procedure: ESOPHAGOGASTRODUODENOSCOPY, WITH BIOPSY;  Surgeon: Myles Mota DO;  Location:  GI     INCISION AND DRAINAGE CHEST WASHOUT, COMBINED N/A 12/11/2022    Procedure: INCISION AND DRAINAGE OF DRIVELINE;  Surgeon: Mac Jaramillo MD;  Location:  OR     INSERT VENTRICULAR ASSIST DEVICE LEFT (HEARTMATE II) N/A 02/18/2020    Procedure: INSERTION, LEFT VENTRICULAR ASSIST DEVICE (HEARTMATE III);  Surgeon: Mac Jaramillo MD;  Location: [facility] OR     IR CVC TUNNEL PLACEMENT > 5 YRS OF AGE  02/23/2021     IR CVC TUNNEL REMOVAL RIGHT  03/16/2021     IR TRANSCATHETER BIOPSY  05/02/2023     MIDLINE INSERTION - DOUBLE LUMEN Left 12/15/2022    left basilic 5 fr dl midline 20 cm     PICC DOUBLE LUMEN PLACEMENT Right 06/22/2023    PICC rewired right basilic vein 42cm total 1cm external.     THORACOSCOPY Right 03/06/2020    Procedure: RIGHT VIDEO-ASSISTED THORASCOPIC SURGERY, EVACUATION OF HEMOTHORAX, PLACEMENT OF CHEST TUBES;  Surgeon: William Gan MD;  Location:  OR       No family history on file.    Social History     Social History Narrative     Not on file     Social History     Tobacco Use     Smoking status: Former      with food  o Zinc: 25-50mg two times daily  o Oscillococcinum®: Take as package recommends       ---------------------------------------------------------------------------------------------------------------------------------------------------------------------   We will contact you in the next 2 days with your covid test results.  Please stay home in isolation until you receive your results.    For comfort from the cough, you can do frequent sips of fluid, suck on hard candy or  cough drops, tea with honey.    Tylenol can help with pain and/or fever as well.  You can use over the counter product such as delsym or robitussin cough syrup   If you have a vaporizer, using that at night will help keep your throat from getting dry and more uncomfortable.      If your symptoms are not improving in the next 2-3 days, please contact your primary care physician or return to urgent care for re-evaluation.  Seek medical attention sooner if symptoms worsening.            Types: Cigarettes     Quit date: 1994     Years since quittin.2     Smokeless tobacco: Never   Vaping Use     Vaping Use: Never used   Substance Use Topics     Alcohol use: Not Currently     Drug use: Never       Immunization History   Administered Date(s) Administered     COVID-19 Bivalent 12+ (Pfizer) 10/26/2022     COVID-19 MONOVALENT 12+ (Pfizer) 2021, 2021, 2021     COVID-19 Monovalent 18+ (Moderna) 2021     Flu, Unspecified 11/15/2019     Influenza (High Dose) 3 valent vaccine 10/25/2019     Influenza Vaccine 65+ (Fluzone HD) 10/25/2019, 10/26/2022     Pneumo Conj 13-V (2010&after) 2018     Zoster recombinant adjuvanted (SHINGRIX) 2020, 2021       Patient Active Problem List   Diagnosis     Acute on chronic systolic congestive heart failure (H)     Anxiety     CKD (chronic kidney disease) stage 3, GFR 30-59 ml/min (H)     Coronary artery disease involving native coronary artery of native heart without angina pectoris     GERD (gastroesophageal reflux disease)     Gout     Hyperlipidemia with target LDL less than 100     Nonischemic cardiomyopathy (H)     Non-nephrotic range proteinuria     ABHINAV on CPAP     Type 2 diabetes mellitus with stage 3 chronic kidney disease, without long-term current use of insulin (H)     Heart failure (H)     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)     Cardiac arrest (H)     LVAD (left ventricular assist device) present (H)     Chronic systolic congestive heart failure (H)     CKD (chronic kidney disease) stage 4, GFR 15-29 ml/min (H)     Bacteremia     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)     Paroxysmal atrial fibrillation (H)     Wound infection     ACC/AHA stage D heart failure (H)     Stage 3b chronic kidney disease (H)     Mixed hyperlipidemia     Benign essential hypertension     Dizziness     Syncope     Tachyarrhythmia     Acute kidney injury (BERNARDA) with acute tubular  necrosis (ATN) (H)     Iron deficiency anemia, unspecified iron deficiency anemia type     Hypervolemia, unspecified hypervolemia type     Acute on chronic congestive heart failure, unspecified heart failure type (H)     Hypervolemia     Anemia, unspecified type     Shortness of breath     BERNARDA (acute kidney injury) (H)       No outpatient medications have been marked as taking for the 7/14/23 encounter (Appointment) with  LVAD.       Allergies   Allergen Reactions     Heparin      HIT screen positive 2/14/20, reflex DAVINA negative; however heme recommended treating as if positive  HIT screen negative 2/11/20     Chlorhexidine Rash     Oxycodone Other (See Comments) and Itching              Physical Exam:   Vitals were reviewed.  All vitals stable  There were no vitals taken for this visit.  Wt Readings from Last 4 Encounters:   06/29/23 83.6 kg (184 lb 4.9 oz)   05/31/23 80 kg (176 lb 5.9 oz)   05/11/23 88.1 kg (194 lb 3.2 oz)   05/03/23 85.5 kg (188 lb 9.6 oz)       Exam:  GENERAL: well-developed, well-nourished, alert, oriented, in no acute distress over video  HEAD: Head is normocephalic, atraumatic   EYES: Eyes have anicteric sclerae.    NEUROLOGIC: Grossly nonfocal.         Laboratory Data:     No results found for: ACD4    Inflammatory Markers    Recent Labs   Lab Test 05/27/23  0654 05/25/23  1059 12/10/22  0616 12/08/22  1256 08/17/21  0807 02/16/21  2219 02/15/21  1910   SED  --   --   --  49*  --  72* 47*   CRP  --   --   --   --  4.9 270.0* 270.0*   CRPI 27.60* 14.60*   < > 31.50*  --   --   --     < > = values in this interval not displayed.       Immune Globulin Studies   No lab results found.    Metabolic Studies    Recent Labs   Lab Test 07/10/23  0959 07/03/23  1038 06/29/23  1102 06/29/23  0736 06/29/23  0510 06/28/23  2131 06/28/23  1901 06/28/23  0719 06/28/23  0612 06/20/23  2141 06/20/23 1942 06/20/23  1934 04/30/23  1124 04/30/23  0824 03/24/23  0111 03/23/23  1735   NA  --   --   --   --   135*  --  132*  --  138   < >  --   --    < > 127*   < > 134*   POTASSIUM  --   --   --   --  3.3*  --  4.1   < > 3.3*   < > 3.6  --    < > 3.5   < > 3.7   CHLORIDE 93*   < >  --   --  103  --  101  --  104   < >  --   --    < > 96*   < > 97*   CO2  --   --   --   --  21*  --  20*  --  21*   < >  --   --    < > 16*   < > 25   ANIONGAP  --   --   --   --  11  --  11  --  13   < >  --   --    < > 15   < > 12   BUN  --   --   --   --  48.7*  --  55.8*  --  57.9*   < >  --   --    < > 85.0*   < > 47.3*   CR  --   --   --   --  2.04*  --  2.17*  --  2.36*   < >  --   --    < > 3.07*   < > 1.49*   [id number] 2.80*   < >  --   --   --   --   --   --   --   --   --   --    < >  --    < >  --    GFRESTIMATED  --   --   --   --  34*  --  32*  --  29*   < >  --   --    < > 21*   < > 50*   GLC  --   --  124*   < > 110*   < > 149*   < > 98   < >  --   --    < > 283*   < > 115*   CALOS  --   --   --   --  7.9*  --  8.1*  --  8.2*   < >  --   --    < > 8.2*   < > 8.1*   PHOS  --   --   --   --   --   --   --   --   --   --   --   --   --   --   --  3.3   MAG  --   --   --   --  1.6*  --   --   --  2.3   < > 2.5*  --    < > 2.0   < > 2.1   URIC  --   --   --   --   --   --   --   --   --   --  7.0  --   --   --   --   --    LACT  --   --   --   --   --   --   --   --   --   --   --  1.1   < >  --    < >  --    CKT  --   --   --   --   --   --   --   --   --   --   --   --   --  77   < >  --     < > = values in this interval not displayed.       Hepatic Studies    Recent Labs   Lab Test 06/26/23  0434 06/24/23  0536 06/21/23  0608 06/20/23  1550 05/30/23  0509 05/29/23  0534 05/26/23  0400 05/25/23  1059   BILITOTAL  --   --   --  0.4  --  0.6  --  0.6   DBIL  --   --   --   --   --  0.24  --   --    ALKPHOS  --   --   --  135*  --  170*  --  140*   PROTTOTAL  --   --   --  7.0  --  7.7  --  6.8   ALBUMIN  --   --   --  3.3*  --  3.7  --  3.1*   AST  --   --   --  46*  --  69*  --  66*   ALT  --   --   --  44  --  50  --  52*   *  401*   < > 405*   < > 378*   < > 320*    < > = values in this interval not displayed.       Pancreatitis testing    Recent Labs   Lab Test 06/21/23  0608 09/03/22  0630   TRIG 64 53       Lipid testing    Recent Labs   Lab Test 06/21/23  0608 09/03/22  0630 02/08/20  0408   CHOL 143 103 118   HDL 64 50 71   LDL 66 42 35   TRIG 64 53 57       Gout Labs      Recent Labs   Lab Test 06/20/23  1942 02/10/22  0730 02/15/21  1910 02/08/20  0408   URIC 7.0 4.2 6.8 7.5*       Hematology Studies   Recent Labs   Lab Test 07/10/23  0959 07/03/23  1038 06/26/23  0434 06/24/23  0536 06/23/23  0544 06/22/23  0515 06/21/23  0911 06/20/23  1550 05/25/23  1546 05/25/23  1059 03/09/21  0510 03/07/21  0543 03/06/21  0430   WBC  --   --  10.1 10.9 10.0 9.5   < > 14.7*   < > 9.0   < > 4.5 4.4   83829 8.5   < >  --   --   --   --   --   --    < >  --    < >  --   --    ANEU  --   --   --   --   --   --   --   --   --   --   --  2.6 2.8   ANEUTAUTO  --   --   --   --   --   --   --  11.8*  --  7.6   < >  --   --    ALYM  --   --   --   --   --   --   --   --   --   --   --  0.9 0.7*   ALYMPAUTO  --   --   --   --   --   --   --  0.7*  --  0.6*   < >  --   --    GIULIANO  --   --   --   --   --   --   --   --   --   --   --  0.7 0.6   AMONOAUTO  --   --   --   --   --   --   --  1.5*  --  0.6   < >  --   --    AEOS  --   --   --   --   --   --   --   --   --   --   --  0.3 0.2   AEOSAUTO  --   --   --   --   --   --   --  0.4  --  0.1   < >  --   --    ABSBASO  --   --   --   --   --   --   --  0.1  --  0.0   < >  --   --    HGB  --   --  8.6* 9.1* 9.1* 9.0*   < > 8.5*   < > 7.3*   < > 7.9* 8.0*   55318 9.9*   < >  --   --   --   --   --   --    < >  --    < >  --   --    HCT  --   --  28.9* 29.6* 29.1* 29.9*   < > 28.0*   < > 24.7*   < > 25.9* 25.3*   PLT  --   --  188 205 215 201   < > 181   < > 191   < > 208 196   14715 227   < >  --   --   --   --   --   --    < >  --    < >  --   --     < > = values in this interval not displayed.        Clotting Studies    Recent Labs   Lab Test 07/06/23  1002 06/29/23  0510 06/28/23  0612 06/27/23  0608 03/25/23  0910 03/24/23  0749   INR 1.5* 1.98* 1.87* 1.98*   < > 2.40*   PTT  --   --   --   --   --  43*    < > = values in this interval not displayed.       Iron Testing    Recent Labs   Lab Test 06/26/23  0434 05/20/23  0857 05/20/23  0039 04/19/23  0457 04/18/23  0535 05/03/22  0943 02/10/22  0730 02/08/20  0932 02/08/20  0408   IRON  --   --  26*  --  28*  --  34*   < > 25*   FEB  --   --  260  --  254  --  244   < > 306   IRONSAT  --   --  10*  --  11*  --  14*   < > 8*   HEAVENLY  --   --  222  --  167  --  124   < > 189      < >  --    < > 90   < > 85   < > 87   FOLIC  --   --  >40.0*  --   --   --   --   --   --    B12  --   --  1,493*  --   --   --   --   --  752    < > = values in this interval not displayed.       Markers  No lab results found.    Invalid input(s): FETOPROTEIN, SERUM, AFP    Autoimmune Testing   No lab results found.    Invalid input(s): ANCAB, PANCA, CANCA    Arterial Blood Gas Testing    Recent Labs   Lab Test 04/21/23  0353 09/03/22  0630 03/11/21  1330 02/25/21  0915 02/22/21  1539 02/22/21  0353 02/21/21  0854 02/21/21  0335 02/20/21  1544 02/20/21  1303 02/20/21  1037 02/20/21  0912 02/20/21  0345   PH  --   --   --   --   --  7.46*  --  7.43  --  7.38 7.41  --  7.45   PCO2  --   --   --   --   --  34*  --  37  --  43 38  --  36   PO2  --   --   --   --   --  70*  --  63*  --  125* 107*  --  124*   HCO3  --   --   --   --   --  24  --  25  --  25 24  --  25   O2PER 21 0 21 21   < > 21.0  21.0   < > 21.0  21.0   < > 40 40   < > 40.0  40.0    < > = values in this interval not displayed.        Thyroid Studies     Recent Labs   Lab Test 06/27/23  0608 05/29/23  0534 05/25/23  1059 05/21/23  0558 04/29/23  0503   TSH 9.14* 12.30* 11.30* 8.09* 7.78*   T4 1.34 1.48 1.21 0.97 0.76*       Urine Studies     Recent Labs   Lab Test 06/20/23 2010 05/25/23  1641 05/20/23 2050  04/18/23  0548 02/16/21  0225   URINEPH 5.0 6.5 6.5 5.0 5.5   NITRITE Negative Negative Negative Negative Negative   LEUKEST Negative Negative Negative Negative Negative   WBCU <1 1 <1 <1 0       Medication levels    Recent Labs   Lab Test 12/10/22  1948 02/14/20  0331 02/13/20  2147   VANCOMYCIN 11.3   < >  --    TOBRA  --   --  13.0    < > = values in this interval not displayed.       CSF testing   No lab results found.    Invalid input(s): CADAM, EVPCR, ENTPCR, ENTEROVIRUS    Microbiology:  Fungal testing  No lab results found.    Invalid input(s): HIFUN, FUNGL    Beta D Glucan levels (Fungitell assay)    No results found for: FGTL, FGTLI     Last Culture results   Culture   Date Value Ref Range Status   06/20/2023 No Growth  Final   06/20/2023 No Growth  Final   05/27/2023 No Growth  Final   05/25/2023 No Growth  Final   05/25/2023 No Growth  Final   05/11/2023 3+ Pseudomonas aeruginosa (A)  Final   05/11/2023 2+ Pseudomonas aeruginosa (A)  Final   03/24/2023 3+ Pseudomonas aeruginosa (A)  Final   03/24/2023 3+ Pseudomonas aeruginosa (A)  Final   03/24/2023 3+ Pseudomonas aeruginosa (A)  Final   03/24/2023 1+ Staphylococcus warneri (A)  Final     Comment:     Susceptibilities not routinely done, refer to antibiogram to view typical susceptibility profiles   03/02/2023 1+ Pseudomonas aeruginosa (A)  Final   03/02/2023 1+ Pseudomonas aeruginosa (A)  Final   03/02/2023 1+ Staphylococcus aureus (A)  Corrected     Comment:     Susceptibility testing requested by Sunni Jimenez pharmacist ([id number]) to check slightly elevated vanco result. 3.8.23 at 1047  Susceptibility testing requested by Sunni Jimenez, pharmacist for [facility].    03/02/2023 1+ Staphylococcus aureus (A)  Final   03/02/2023 No anaerobic organisms isolated  Final   12/11/2022 No anaerobic organisms isolated  Final   12/11/2022 No Growth  Final   12/11/2022 1+ Staphylococcus aureus (A)  Final     Comment:     Susceptibilities done on previous cultures    12/11/2022 1+ Staphylococcus aureus (A)  Final     Comment:     Susceptibilities done on previous cultures     Culture Micro   Date Value Ref Range Status   02/17/2021 No growth  Final   02/17/2021 No growth  Final   02/16/2021 Light growth  Enterococcus faecium   (A)  Final   02/16/2021 (A)  Final    Legionella pneumophila  isolated  Sent to [facility] for serotyping     02/16/2021   Final    Critical Value/Significant Value, preliminary result only, called to and read back by  Shobha Pedro RN on 2.23.21 at 1401. bw     02/16/2021 (A)  Final    Report received from the [place] [facility]:  Legionella pneumophila serogroup 1  This test was developed and its performance characteristics determined by the [facility].  It has not been cleared or approved by the U.S. Food and Drug   Administration:21CFR 809.30(e).  The FDA has determined that such clearance is not   necessary.     02/15/2021 Moderate growth  Staphylococcus aureus   (A)  Final   02/15/2021 Moderate growth  Strain 2  Staphylococcus aureus   (A)  Final   02/15/2021 Moderate growth  Strain 3  Staphylococcus aureus   (A)  Final   02/15/2021 Light growth  Normal skin freeman    Final         Last checks of Clostridioides difficile testing  Recent Labs   Lab Test 04/28/23  1858 11/15/20  1445   CDBPCT Negative Negative       No components found for: AFBSTN    Syphilis Testing  Invalid input(s): FBF2796    Tick Testing  No lab results found.    Invalid input(s): APHAGM    ASO Testing  Invalid input(s): NNE0401    Quantiferon testing   Recent Labs   Lab Test 06/20/23  1550 05/25/23  1059 02/13/20  1030 02/12/20  0440 02/08/20  0408   TBRES  --   --   --  Negative Negative   LYMPH 5 7   < > 4.8 17.5    < > = values in this interval not displayed.       Infection Studies to assess Diarrhea  Recent Labs   Lab Test 06/25/23  1340   EPSTX1 Not Detected   EPSTX2 Not Detected   EPCAMP Not Detected   EPSALM Not Detected   EPSHGL Not Detected   EPVIB  Not Detected   EPROTA Not Detected   EPNORO Not Detected   EPYER Not Detected       Virology:  Coronavirus-19 testing    Recent Labs   Lab Test 04/17/23  1716 03/23/23  1735 02/10/23  1040 02/07/23  2221 09/02/22  1749 02/07/22  1456 01/03/22  1011 08/17/21  1229 03/15/21  1612 03/09/21  1335 01/05/21  1521 11/14/20  2140   JMF00YN  --   --   --   --   --   --   --   --  Positive  --   --   --    CKM67SQS  --   --   --   --   --   --   --   --  1:100  --   --   --    [id number] Negative Negative Negative Negative   < >  --   --   --   --  NEGATIVE   < > Test received-See reflex to IDDL test SARS CoV2 (COVID-19) Virus RT-PCR  NEGATIVE   [id number]  --   --   --   --   --   --   --   --   --  Nasopharyngeal   < > Nasopharyngeal   [id number]  --   --   --   --   --   --   --   --   --   --   --  Nasopharyngeal   COVIDPCREXT  --   --   --   --   --  Detected*  Detected* POSITIVE*  POSITIVE*  --   --   --   --   --    [id number]  --   --   --   --   --   --   --  Negative  --   --   --   --    [id number]  --   --   --   --   --   --   --  <0.40  --   --   --   --     < > = values in this interval not displayed.       Respiratory virus (non-coronavirus-19) testing    Recent Labs   Lab Test 02/15/21  2058   IFLUA Not Detected   FLUAH1 Not Detected   IC3551 Not Detected   FLUAH3 Not Detected   IFLUB Not Detected   PIV1 Not Detected   PIV2 Not Detected   PIV3 Not Detected   PIV4 Not Detected   RSVA Not Detected   RSVB Not Detected   HMPV Not Detected   RHINEV Not Detected   ADENOV Not Detected   BUCHANAN Not Detected       CMV viral loads  No results found for: CMVQNT, CMVRESINST, CMVLOG, 98858, 27659, 23018, 04595    CMV resistance testing  No lab results found.  No results found for: CMVCID, CMVFOS, CMVGAN    No results found for: H6RES    No results found for: EBVDN, EBRES, EBVDN, EBVSP, EBVPC, EBVPCR    BK viral loads No lab results found.    Parvovirus Testing  No lab results found.    Invalid input(s):  PRVRES    Adenovirus Testing  No lab results found.    Invalid input(s): ADENAB, ADENOVIRUS, ADQT    Hepatitis B Testing     Recent Labs   Lab Test 04/27/23  0708 02/28/21  1155 02/12/20  0440   AUSAB  --  0.00 0.69   HBCAB Nonreactive  --  Nonreactive   HEPBANG Nonreactive Nonreactive Nonreactive     Was the last Hepatitis B E antigen positive?   No results found for: HBEAGN     Hepatitis C Antibody   Date Value Ref Range Status   04/27/2023 Nonreactive Nonreactive Final   02/12/2020 Nonreactive NR^Nonreactive Final     Comment:     Assay performance characteristics have not been established for newborns,   infants, and children     02/08/2020 Nonreactive NR^Nonreactive Final     Comment:     Assay performance characteristics have not been established for newborns,   infants, and children         CMV Antibody IgG   Date Value Ref Range Status   02/12/2020 >8.0 (H) 0.0 - 0.8 AI Final     Comment:     Positive  Antibody index (AI) values reflect qualitative changes in antibody   concentration that cannot be directly associated with clinical condition or   disease state.     02/08/2020 7.8 (H) 0.0 - 0.8 AI Final     Comment:     Positive  Antibody index (AI) values reflect qualitative changes in antibody   concentration that cannot be directly associated with clinical condition or   disease state.       Varicella Zoster Virus Antibody IgG   Date Value Ref Range Status   02/12/2020 2.0 (H) 0.0 - 0.8 AI Final     Comment:     Positive, suggests prev. exposure and probable immunity  Antibody index (AI) values reflect qualitative changes in antibody   concentration that cannot be directly associated with clinical condition or   disease state.     02/08/2020 2.4 (H) 0.0 - 0.8 AI Final     Comment:     Positive, suggests prev. exposure and probable immunity  Antibody index (AI) values reflect qualitative changes in antibody   concentration that cannot be directly associated with clinical condition or   disease state.        Toxoplasma Antibody IgG   Date Value Ref Range Status   02/12/2020 <3.0 0.0 - 7.1 IU/mL Final     Comment:     Negative- Absence of detectable Toxoplasma gondii IgG antibodies. A negative   result does not rule out acute infection.  The magnitude of the measured result is not indicative of the amount of   antibody present. The concentrations of anti-Toxoplasma gondii IgG in a given   specimen determined with assays from different manufacturers can vary due to   differences in assay methods and reagent specificity.     02/08/2020 <3.0 0.0 - 7.1 IU/mL Final     Comment:     Negative- Absence of detectable Toxoplasma gondii IgG antibodies. A negative   result does not rule out acute infection.  The magnitude of the measured result is not indicative of the amount of   antibody present. The concentrations of anti-Toxoplasma gondii IgG in a given   specimen determined with assays from different manufacturers can vary due to   differences in assay methods and reagent specificity.       No results found for: H1IGG, H2IGG, EBVCAM    No components found for: [id number]    Last Pathology Report   Case Report   Date Value Ref Range Status   05/22/2023   Final    Surgical Pathology Report                         Case: [id number]                                  Authorizing Provider:  Myles Mota DO          Collected:           05/22/2023 12:55 PM          Ordering Location:     [facility]          Received:            05/22/2023 03:03 PM                                 Endoscopy                                                                    Pathologist:           Anthony Chilel DO                                                            Specimens:   A) - Small Intestine, Duodenum, Duodenal biopsy                                                     B) - Other, gastric polyp biopsy                                                                    C) - Other, gastric biopsies                                                                         D) - Large Intestine, Colon, Ascending, Colon polyp                                                 E) - Large Intestine, Colon, Transverse, Colon polyp                                                F) - Large Intestine, Colon, Sigmoid, Sigmoid colon polyp                                   Clinical Information   Date Value Ref Range Status   05/22/2023   Final     Iron (Fe) deficiency anemia        Final Diagnosis   Date Value Ref Range Status   05/22/2023   Final    A. DUODENUM, BIOPSY:  - Unremarkable duodenal mucosa  - No evidence of celiac disease    B. GASTRIC, POLYP:  - Gastric hyperplastic polyp with ulceration  - No H. pylori-like organisms identified on routine staining  - No intestinal metaplasia identified    C. GASTRIC, BIOPSY:  - Reactive gastropathy with mucosal iron deposition  - No H. pylori-like organisms identified on routine staining  - No intestinal metaplasia identified    D. ASCENDING COLON, POLYP:  - Unremarkable colonic mucosa  - No histologic evidence of a polyp identified    E. TRANSVERSE COLON, POLYP:  - Tubular adenoma  - No high-grade dysplasia or malignancy identified    F. SIGMOID COLON, POLYP:  - Tubular adenoma  - No high-grade dysplasia or malignancy identified             Imaging:  Results for orders placed or performed during the hospital encounter of 06/20/23   XR Chest 2 Views    Narrative    XR CHEST 2 VIEWS  6/20/2023 2:44 PM      HISTORY: SOB    COMPARISON: 5/25/2023, 4/17/2023    FINDINGS: PA and lateral views. Stable LVAD and left chest wall  automatic implantable cardiac defibrillator lead. Right arm PICC tip  projects over the right atrium. Sternotomy is intact. Stable enlarged  cardiac silhouette. Improved streaky perihilar opacities compared to  5/25/2023. No substantial pleural effusion or pneumothorax.      Impression    IMPRESSION:   No acute airspace opacities. Improved streaky perihilar  atelectasis/edema compared to 5/25/2023    I  have personally reviewed the examination and initial interpretation  and I agree with the findings.    QUIQUE NICHOLS MD         SYSTEM ID:  [id number]   XR Chest Port 1 View    Narrative    XR CHEST PORT 1 VIEW  6/22/2023 4:27 PM      HISTORY: s/p picc    COMPARISON: 6/20/2023    FINDINGS: AP view. Right arm PICC tip projects over the right atrium.  Stable left chest wall implantable cardiac defibrillator and LVAD.  Median sternotomy. The cardiac silhouette is stable. Stable streaky  perihilar opacities. No pleural effusion or pneumothorax.      Impression    IMPRESSION:   1. Right arm PICC tip projects over the right atrium.  2. Stable streaky perihilar atelectasis/edema.     I have personally reviewed the examination and initial interpretation  and I agree with the findings.    LUPE MASON MD         SYSTEM ID:  [id number]   Cardiac Catheterization    Narrative       Right sided filling pressures are moderately elevated.     Left sided filling pressures are moderately elevated.     Mild elevated pulmonary hypertension.     Cardiac Device Check - Inpatient     Value    Date Time Interrogation Session [id number]    Implantable Pulse Generator  Medtronic    Implantable Pulse Generator Model [id number] Visia AF MRI VR    Implantable Pulse Generator Serial Number [id number]    Type Interrogation Session In Clinic    Clinic Name [facility]    Implantable Pulse Generator Type Defibrillator    Implantable Pulse Generator Implant Date 20200316    Implantable Lead  Medtronic    Implantable Lead Model 6935M Sprint Quattro Secure S MRI SureScan    Implantable Lead Serial Number [id number]    Implantable Lead Implant Date 20200316    Implantable Lead Polarity Type Tripolar Lead    Implantable Lead Location Detail 1 UNKNOWN    Implantable Lead Special Function 62cm length    Implantable Lead Location Right Ventricle    Glenn Setting Mode (NBG Code) VVIR    Glenn Setting Lower  Rate Limit 60    Glenn Setting Maximum Sensor Rate 115    Glenn Setting Hysterisis Rate DISABLED    Lead Channel Setting Sensing Polarity Bipolar    Lead Channel Setting Sensing Anode Location Right Ventricle    Lead Channel Setting Sensing Anode Terminal Ring    Lead Channel Setting Sensing Cathode Location Right Ventricle    Lead Channel Setting Sensing Cathode Terminal Tip    Lead Channel Setting Sensing Sensitivity 0.3    Lead Channel Setting Pacing Polarity Bipolar    Lead Channel Setting Pacing Anode Location Right Ventricle    Lead Channel Setting Pacing Anode Terminal Ring    Lead Channel Setting Sensing Cathode Location Right Ventricle    Lead Channel Setting Sensing Cathode Terminal Tip    Lead Channel Setting Pacing Pulse Width 0.4    Lead Channel Setting Pacing Amplitude 2    Lead Channel Setting Pacing Capture Mode Adaptive    Zone Setting Type Category VF    Zone Setting Detection Interval 270    Zone Setting Detection Beats Numerator 30    Zone Setting Detection Beats Denominator 40    Zone Setting Type Category VT    Zone Setting Detection Interval Blank    Zone Setting Type Category VT    Zone Setting Detection Interval 460    Zone Setting Type Category VT    Zone Setting Detection Interval 500    Zone Setting Type Category ATRIAL_FIBRILLATION    Zone Setting Type Category AT/AF    Lead Channel Impedance Value 399    Lead Channel Impedance Value 304    Lead Channel Sensing Intrinsic Amplitude 10.25    Lead Channel Pacing Threshold Amplitude 0.75    Lead Channel Pacing Threshold Pulse Width 0.4    Battery Date Time of Measurements 30460523609515    Battery Status Middle of Service    Battery RRT Trigger 2.727    Battery Remaining Longevity 100    Battery Voltage 2.99    Capacitor Charge Type Reformation    Capacitor Last Charge Date Time 45518158230894    Capacitor Charge Time 3.743    Capacitor Charge Energy 18    Glenn Statistic Date Time Start 13771771549029    Glenn Statistic Date Time End  21235113037116    Glenn Statistic RV Percent Paced 0.36    Atrial Tachy Statistic Date Time Start 43067374731955    Atrial Tachy Statistic Date Time End 20230621080336    Atrial Tachy Statistic AT/AF [place] Percent 0.3    Therapy Statistic Recent Shocks Delivered 0    Therapy Statistic Recent Shocks Aborted 0    Therapy Statistic Recent ATP Delivered 0    Therapy Statistic Recent Date Time Start 77276742902463    Therapy Statistic Recent Date Time End 74786096080524    Therapy Statistic Total Shocks Delivered 0    Therapy Statistic Total Shocks Aborted 0    Therapy Statistic Total ATP Delivered 0    Therapy Statistic Total  Date Time Start 70483118828090    Therapy Statistic Total  Date Time End 78425631396434    Episode Statistic Recent Count 17    Episode Statistic Type Category AT/AF    Episode Statistic Recent Count 0    Episode Statistic Type Category SVT    Episode Statistic Recent Count 0    Episode Statistic Type Category VT    Episode Statistic Recent Count 0    Episode Statistic Type Category VF    Episode Statistic Recent Count 0    Episode Statistic Type Category VT    Episode Statistic Recent Count 0    Episode Statistic Type Category VT    Episode Statistic Recent Count 0    Episode Statistic Type Category VT    Episode Statistic Recent Date Time Start 55571895158056    Episode Statistic Recent Date Time End 56977090755447    Episode Statistic Recent Date Time Start 50450045694774    Episode Statistic Recent Date Time End 65732599767364    Episode Statistic Recent Date Time Start 98690618279065    Episode Statistic Recent Date Time End 89110316246662    Episode Statistic Recent Date Time Start 50955998946994    Episode Statistic Recent Date Time End 57170260331577    Episode Statistic Recent Date Time Start 97456600040496    Episode Statistic Recent Date Time End 97570830831012    Episode Statistic Recent Date Time Start 69893769704902    Episode Statistic Recent Date Time End 37434587621459    Episode  Statistic Recent Date Time Start 88638101038257    Episode Statistic Recent Date Time End 65813108552731    Episode Statistic Total Count 259    Episode Statistic Type Category AT/AF    Episode Statistic Total Count 0    Episode Statistic Type Category SVT    Episode Statistic Total Count 0    Episode Statistic Type Category VT    Episode Statistic Total Count 0    Episode Statistic Type Category VF    Episode Statistic Total Count 0    Episode Statistic Type Category VT    Episode Statistic Total Count 0    Episode Statistic Type Category VT    Episode Statistic Total Count 2    Episode Statistic Type Category VT    Episode Statistic Total Date Time Start 90205313392194    Episode Statistic Total Date Time End 48055684752581    Episode Statistic Total Date Time Start 17588031556376    Episode Statistic Total Date Time End 84157509650633    Episode Statistic Total Date Time Start 00187531797870    Episode Statistic Total Date Time End 17812799046955    Episode Statistic Total Date Time Start 43645952550958    Episode Statistic Total Date Time End 04852478464253    Episode Statistic Total Date Time Start 35407054389128    Episode Statistic Total Date Time End 92151561107880    Episode Statistic Total Date Time Start 14869413606768    Episode Statistic Total Date Time End 12663629296699    Episode Statistic Total Date Time Start 73332205219251    Episode Statistic Total Date Time End 09665958440259    Episode Identifier 261    Episode Type Category Monitor    Episode Date Time 93730649428978    Episode Duration 360    Episode Identifier 260    Episode Type Category Monitor    Episode Date Time 37362396752935    Episode Duration 480    Episode Identifier 259    Episode Type Category Monitor    Episode Date Time 11267570849329    Episode Duration 360    Episode Identifier 258    Episode Type Category Monitor    Episode Date Time 19793320766739    Episode Duration 600    Episode Identifier 257    Episode Type Category  Monitor    Episode Date Time 20230608002710    Episode Duration 360    Episode Identifier 256    Episode Type Category Monitor    Episode Date Time 20230607214710    Episode Duration 960    Episode Identifier 255    Episode Type Category Monitor    Episode Date Time 20230607035110    Episode Duration 720    Episode Identifier 254    Episode Type Category Monitor    Episode Date Time 20230607023910    Episode Duration 360    Episode Identifier 253    Episode Type Category Monitor    Episode Date Time [id number]    Episode Duration 600    Episode Identifier 252    Episode Type Category Monitor    Episode Date Time [id number]    Episode Duration 360    Episode Identifier 251    Episode Type Category Monitor    Episode Date Time [id number]    Episode Duration 480    Episode Identifier 250    Episode Type Category Monitor    Episode Date Time [id number]    Episode Duration 480    Episode Identifier 249    Episode Type Category Monitor    Episode Date Time [id number]    Episode Duration 1,800    Episode Identifier 248    Episode Type Category Monitor    Episode Date Time [id number]    Episode Duration 1,200    Episode Identifier 247    Episode Type Category Monitor    Episode Date Time [id number]    Episode Duration 1,560    Episode Identifier 246    Episode Type Category Monitor    Episode Date Time [id number]    Episode Duration 360    Episode Identifier 245    Episode Type Category Monitor    Episode Date Time [id number]    Episode Duration 480    Narrative    Patient seen on [address] for evaluation and iterative programming of their ICD per MD orders.     Device: Visonys [id number] Visia AF MRI VR  Normal device function.  Mode: VVI 40 bpm  : 0.4%  Intrinsic rhythm:  bpm  Thoracic Impedance: Below reference line, suggests possible intrathoracic fluid accumulation.  Short V-V intervals: 0  Lead Trends Appear Stable:.  Estimated battery longevity to RRT = 8.3 years. Battery  voltage = 3.01 V.   Atrial Arrhythmia: 17 AT/AF episodes detected, longest episode 30 min, Avg V rate 100 bpm.  AF [place]: 0.3%  Anticoagulant: Warfarin  Ventricular Arrhythmia: None  Setting Changes: None    Plan: Continue to follow patient throughout hospitalization then, device follow-up every 3 months.    Candis Gaytan RN    Single lead ICD    I have reviewed and interpreted the device interrogation, settings, programming and nurse's summary. The device is functioning within normal device parameters. I agree with the current findings, assessment and plan.     *Note: Due to a large number of results and/or encounters for the requested time period, some results have not been displayed. A complete set of results can be found in Results Review.

## 2023-07-13 NOTE — PROGRESS NOTES
ANTICOAGULATION MANAGEMENT     Eliseo Tanner 68 year old male is on warfarin with therapeutic INR result. (Goal INR 1.7-2.3)    Recent labs: (last 7 days)     07/13/23  1544   INR 1.9*       ASSESSMENT     Source(s): Chart Review and Patient/Caregiver Call     Warfarin doses taken: Warfarin taken as instructed including boost dose just 3 days ago, may be seeing maximum effect of this boost with today's INR  Diet: No new diet changes identified  Medication/supplement changes: None noted  New illness, injury, or hospitalization: No  Signs or symptoms of bleeding or clotting: No  Previous result: Subtherapeutic  Additional findings: None       PLAN     Recommended plan for no diet, medication or health factor changes affecting INR     Dosing Instructions: Continue your current warfarin dose with next INR in 1 week. Discussed with Veornique that if the INR drops back down (without the boost included) we may need to increase maintenance with next INR     Summary  As of 7/13/2023      Full warfarin instructions:  2 mg every Mon, Wed, Fri; 4 mg all other days   Next INR check:  7/20/2023               Telephone call with  wife Veronique who verbalizes understanding and agrees to plan    Patient using outside facility for labs    Education provided:   Contact [phone] with any changes, questions or concerns.     Plan made per [facility] anticoagulation protocol and per LVAD protocol    Bailey Hawkins RN  Anticoagulation Clinic  7/13/2023    _______________________________________________________________________     Anticoagulation Episode Summary       Current INR goal:  1.7-2.3   TTR:  46.6 % (8.2 mo)   Target end date:  Indefinite   Send INR reminders to:  ANTICOAG LVAD    Indications    LVAD (left ventricular assist device) present (H) [Z95.811]  Chronic systolic congestive heart failure (H) [I50.22]  Paroxysmal atrial fibrillation (H) [I48.0]             Comments:  Follow VAD Anticoag protocol:Yes: HeartMate 3   Bridging: Call MD  each time   Date VAD placed: 2/18/2020 5/6/22 [employer] [facility]             Anticoagulation Care Providers       Provider Role Specialty Phone number    Nader Cisneros MD Referring Cardiovascular Disease [phone]

## 2023-07-14 NOTE — PROGRESS NOTES
[facility] - CardioMEMS Reading Review    July 14, 2023, 1300   CardioMems period: 6/17/2023 - 7/16/23      CardioMEMS reviewed and routed to patient's provider, Francoise RENDON NP.     Current diuretic:  Torsemide 100 TID, Hydrochlorothiazide 75mg daily  Current potassium chloride dose:  Potassium 100mEq TID    Symptoms: No changes  Weights: (Dry weight ~180 lbs) Today, going back: 187, 186  Labs:      Changes to plan of care today: No changes    Current Threshold parameters: 21 - 24    Today's Waveform:       Readings:         RHC Date Wedge Pressure MEMS PAD during cath  (average of the 3 values)     Sept 20, 2022 w/MEMS implant     15 mmHg   16 mmHg

## 2023-07-14 NOTE — PROGRESS NOTES
Virtual Visit Details    Type of service:  Video Visit   Video Start Time:8.28  Video End Time:8:41 AM    Originating Location (pt. Location): Home    Distant Location (provider location):  On-site  Platform used for Video Visit: [employer]

## 2023-07-14 NOTE — NURSING NOTE
Is the patient currently in the state of [place]? YES    Visit mode:VIDEO    If the visit is dropped, the patient can be reconnected by: TELEPHONE VISIT: Phone number: [phone]    Will anyone else be joining the visit? NO      How would you like to obtain your AVS? MyChart    Are changes needed to the allergy or medication list? NO    Reason for visit: Video Visit (Recheck)

## 2023-07-14 NOTE — LETTER
7/14/2023       RE: Eliseo Tanner  [address] [place] [address]     Dear Colleague,    Thank you for referring your patient, Eliseo Tanner, to the [facility] INFECTIOUS DISEASE CLINIC [place] at [facility]. Please see a copy of my visit note below.    [facility] Infectious Disease Clinic Note:  Follow up patient     Patient:  Eliseo Tanner, Date of birth 1955, Medical record number [id number]  Date of Visit:  07/13/2023   Start at 8.28 and end at 8:41         Assessment and Recommendations:   Recommendations:  Continue renally dosed cefepime 2 grams IV q 24 hours (covers MSSA and PsA) as he is doing very well with hardly any driange.  He continues to go to an infusion center daily for cefepime.  Cefepime dose will need to be adjusted for his renal dysfunction. Estimated Creatinine Clearance: 35.1 mL/min (A) (based on SCr of 2.04 mg/dL (H)). As his creatinine clearance around ~ 30. Although it fluctuates above 30 given the variability of it with frequent diuretic dose changes will keep at daily intervals. If creatinine clearance were to drop < 11 then the dose would need to be lowered.  Anticipate a indefinite duration    Pending his clinical status we could consider phage therapy in the future - 5/27/23 culture negative. We need culture positivity to develop phages.  Weekly CBC with diff, CMP. Last labs on 7/10 are ok, Cr is up slightly. Encourage hydration.   Follow up in LVAD clinic in 8 weeks.     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      LVAD History:  Current LVAD model: History of NICM s/p HM III  Date current LVAD placed: 2/18/20  Previous LVAD devices: none  Other prosthetic devices/materials:  ICD 3/16/20, Mems 8/2022  Primary cardiologist:   Blayne  Primary ID provider: Magy     History of bacteremias (dates and organisms):   --History of pre-op Proteus and Enterococcus bacteremia  --MSSA bacteremia secondary to MSSA driveline infection (11/2020). Previously on cefadroxil or cephalexin for suppression.     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 Increased drainage and fluid collection in 12/2022 s/p I&D of fluid 12/11/22; 12/11/22 Cx grew MSSA (tetra-R). 12/7 outside culture with both MRSA and MSSA. He was placed on daptomycin and attempted to transition to Bactrim suppression in 3/2023 but developed BERNARDA so has remained on Daptomycin. 3/2/23 cx grew Pseudomonas x2 (pan-S) and MSSA x2 strains (both R: tetracycline; 1 with high daptomycin DONTA). Ciprofloxacin was added to daptomycin due to increased drainage. 3/24/23 cultures grew Pseudomonas x3 strains (all FQ resistant). Although cipro resistant Pseudomonas but since he had clinically improved cipro was continued. CT (4/23/23) with soft tissue thickening along driveline but no abscess (no surgical intervention).  5/11/23 cx again grew 2 strains of PsA resistant to FQ. Changed cipro to renally dosed cefepime. Since MRSA was not isolated and previously was only isolated once (12/2022) we discontinued daptomycin. Last culture on 5/27/23 did not have growth.      Hospitalized 5/20-5/31 after a fall and drop in Hgb. Colonoscopy and EGD performed 5/22/23. Numerous polyps found. Required transfusions. Cefepime was stopped transiently due to AMS but then resumed. Last seen by ID on 5/31/23. Resumed cefepime after it was determined that it was not likely the cefepime causing AMS. His CT abd on 5/20 did not indicate an abd infection.     History of other pertinent infections:   --Hx severe Legionella pneumonia (serogroup 1L) c/b cardiogenic shock, renal failure  requiring CRRT, and resp failure requiring intubation, s/p azithromycin     History of driveline area irritation and current mitigation strategies:  local wound care center using vashe nzt 5.5 wound solution - soaking a hydrofera blue sponge and covering with mepilex. Changing dressing daily.      Current suppressive antibiotics: cefepime  Previous antibiotic failures/allergies/intolerances etc: cephalexin, cefadroxil, daptomycin, ciprofloxacin        Interval history :   He was seen by ID on 6/6/23 and was doing well with less drainage and was feeling well. He was continued on Cefepime.He was admitted from 6/20/2023 to 6/29/2023 due to fluid overload state from heart failure. He was placed on dobutamine and was discharged home on IV continuous dobutamine and his VAD rate was increased.     He is here for follow up over video. Since his admission, he states that he has been doing well. He has no complaints. He is taking cefepime. He has not missed any doses. He seems upset about the daily infusion of abx. He has no wound vac and the wound is looking great per pt with no drainage. He has no pain around the site. No fevers, no chills, no sob, no chest pain, no abd pain, n/v/d or dysuria. Of note, he was hoping for an oral antibiotic for the PsA but limited options exist.     Transplants:  N/A; Postoperative day:  .  Coordinator Luiza Mcneil    Review of Systems:  CONSTITUTIONAL:  No fevers or chills. No night sweats.  EYES: negative for icterus or acute vision changes.   ENT:  negative for hearing loss, tinnitus or sore throat  RESPIRATORY:  negative for cough, sputum, dyspnea  CARDIOVASCULAR:  negative for chest pain, heart palpitations  GASTROINTESTINAL:  negative for nausea, vomiting, diarrhea or constipation  GENITOURINARY:  negative for dysuria or hematuria.  HEME:  No easy bruising or bleeding  INTEGUMENT:  negative for rash or pruritus  NEURO:  Negative for headache or tremor.    Past Medical History:    Diagnosis Date    Chronic systolic congestive heart failure (H)     History of implantable cardioverter-defibrillator (ICD) placement     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)     MSSA    Legionella pneumonia (H)     LVAD (left ventricular assist device) present (H)     MSSA bacteremia 11/2020       Past Surgical History:   Procedure Laterality Date    ANESTHESIA CARDIOVERSION N/A 02/28/2020    Procedure: ANESTHESIA, FOR CARDIOVERSION;  Surgeon: GENERIC ANESTHESIA PROVIDER;  Location: [facility] OR    COLONOSCOPY N/A 05/22/2023    Procedure: COLONOSCOPY, WITH POLYPECTOMY AND BIOPSY;  Surgeon: Myles Mota DO;  Location: [facility] GI    CV CARDIOMEMS WITH RIGHT HEART CATH N/A 09/20/2022    Procedure: Pulmonary Arterial Pressure Sensor Placement CPT Codes to be cleared by financial securing for this implant. [id number] and ;  Surgeon: Dalton Baeza MD;  Location:  HEART CARDIAC CATH LAB    CV CENTRAL VENOUS CATHETER PLACEMENT N/A 02/13/2020    Procedure: Central Venous Catheter Placement;  Surgeon: Chente Moss MD;  Location:  HEART CARDIAC CATH LAB    CV INTRA AORTIC BALLOON N/A 02/07/2020    Procedure: Intra-Aortic Balloon Pump Insertion;  Surgeon: Jose Baldwin MD;  Location:  HEART CARDIAC CATH LAB    CV INTRA AORTIC BALLOON N/A 02/13/2020    Procedure: Intra-Aortic Balloon Pump Insertion;  Surgeon: Chente Moss MD;  Location:  HEART CARDIAC CATH LAB    CV RIGHT HEART CATH MEASUREMENTS RECORDED N/A 09/21/2020    Procedure: CV RIGHT HEART CATH;  Surgeon: Dalton Baeza MD;  Location:  HEART CARDIAC CATH LAB    CV RIGHT HEART CATH MEASUREMENTS RECORDED N/A 01/07/2021    Procedure: Right Heart Cath;  Surgeon: Chun Ball MD;  Location:  HEART CARDIAC CATH LAB    CV RIGHT HEART CATH MEASUREMENTS RECORDED N/A 02/10/2022    Procedure: CV RIGHT HEART CATH;  Surgeon: Dalton Baeza MD;  Location: [facility] CARDIAC  CATH LAB    CV RIGHT HEART CATH MEASUREMENTS RECORDED N/A 09/20/2022    Procedure: Right Heart Catheterization [[id number]];  Surgeon: Dalton Baeza MD;  Location:  HEART CARDIAC CATH LAB    CV RIGHT HEART CATH MEASUREMENTS RECORDED N/A 12/12/2022    Procedure: Right Heart Cath;  Surgeon: Chente Moss MD;  Location:  HEART CARDIAC CATH LAB    CV RIGHT HEART CATH MEASUREMENTS RECORDED N/A 04/28/2023    Procedure: Right Heart Catheterization;  Surgeon: Aaliyah Fischer MD;  Location:  HEART CARDIAC CATH LAB    CV RIGHT HEART CATH MEASUREMENTS RECORDED N/A 06/21/2023    Procedure: Right Heart Catheterization;  Surgeon: Nader Cisneros MD;  Location:  HEART CARDIAC CATH LAB    CV SWAN LUCIANA PROCEDURE N/A 02/13/2020    Procedure: Neotsu Luciana Procedure;  Surgeon: Chente Moss MD;  Location:  HEART CARDIAC CATH LAB    EP ICD Bilateral 03/16/2020    Procedure: EP ICD;  Surgeon: Dali Day MD;  Location:  HEART CARDIAC CATH LAB    ESOPHAGOSCOPY, GASTROSCOPY, DUODENOSCOPY (EGD), COMBINED N/A 05/22/2023    Procedure: ESOPHAGOGASTRODUODENOSCOPY, WITH BIOPSY;  Surgeon: Myles Mota DO;  Location:  GI    INCISION AND DRAINAGE CHEST WASHOUT, COMBINED N/A 12/11/2022    Procedure: INCISION AND DRAINAGE OF DRIVELINE;  Surgeon: Mac Jaramillo MD;  Location: [facility] OR    INSERT VENTRICULAR ASSIST DEVICE LEFT (HEARTMATE II) N/A 02/18/2020    Procedure: INSERTION, LEFT VENTRICULAR ASSIST DEVICE (HEARTMATE III);  Surgeon: Mac Jaramillo MD;  Location: [facility] OR    IR CVC TUNNEL PLACEMENT > 5 YRS OF AGE  02/23/2021    IR CVC TUNNEL REMOVAL RIGHT  03/16/2021    IR TRANSCATHETER BIOPSY  05/02/2023    MIDLINE INSERTION - DOUBLE LUMEN Left 12/15/2022    left basilic 5 fr dl midline 20 cm    PICC DOUBLE LUMEN PLACEMENT Right 06/22/2023    PICC rewired right basilic vein 42cm total 1cm external.    THORACOSCOPY Right 03/06/2020    Procedure: RIGHT VIDEO-ASSISTED THORASCOPIC  SURGERY, EVACUATION OF HEMOTHORAX, PLACEMENT OF CHEST TUBES;  Surgeon: William Gan MD;  Location: [facility] OR       No family history on file.    Social History     Social History Narrative    Not on file     Social History     Tobacco Use    Smoking status: Former     Types: Cigarettes     Quit date: 1994     Years since quittin.2    Smokeless tobacco: Never   Vaping Use    Vaping Use: Never used   Substance Use Topics    Alcohol use: Not Currently    Drug use: Never       Immunization History   Administered Date(s) Administered    COVID-19 Bivalent 12+ (Pfizer) 10/26/2022    COVID-19 MONOVALENT 12+ (Pfizer) 2021, 2021, 2021    COVID-19 Monovalent 18+ (Moderna) 2021    Flu, Unspecified 11/15/2019    Influenza (High Dose) 3 valent vaccine 10/25/2019    Influenza Vaccine 65+ (Fluzone HD) 10/25/2019, 10/26/2022    Pneumo Conj 13-V (2010&after) 2018    Zoster recombinant adjuvanted (SHINGRIX) 2020, 2021       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)    Cardiac arrest (H)    LVAD (left ventricular assist device) present (H)    Chronic systolic congestive heart failure (H)    CKD (chronic kidney disease) stage 4, GFR 15-29 ml/min (H)    Bacteremia    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)    Paroxysmal atrial fibrillation (H)    Wound  infection    ACC/AHA stage D heart failure (H)    Stage 3b chronic kidney disease (H)    Mixed hyperlipidemia    Benign essential hypertension    Dizziness    Syncope    Tachyarrhythmia    Acute kidney injury (BERNARDA) with acute tubular necrosis (ATN) (H)    Iron deficiency anemia, unspecified iron deficiency anemia type    Hypervolemia, unspecified hypervolemia type    Acute on chronic congestive heart failure, unspecified heart failure type (H)    Hypervolemia    Anemia, unspecified type    Shortness of breath    BERNARDA (acute kidney injury) (H)       No outpatient medications have been marked as taking for the 7/14/23 encounter (Appointment) with  LVAD.       Allergies   Allergen Reactions    Heparin      HIT screen positive 2/14/20, reflex DAVINA negative; however heme recommended treating as if positive  HIT screen negative 2/11/20    Chlorhexidine Rash    Oxycodone Other (See Comments) and Itching              Physical Exam:   Vitals were reviewed.  All vitals stable  There were no vitals taken for this visit.  Wt Readings from Last 4 Encounters:   06/29/23 83.6 kg (184 lb 4.9 oz)   05/31/23 80 kg (176 lb 5.9 oz)   05/11/23 88.1 kg (194 lb 3.2 oz)   05/03/23 85.5 kg (188 lb 9.6 oz)       Exam:  GENERAL: well-developed, well-nourished, alert, oriented, in no acute distress over video  HEAD: Head is normocephalic, atraumatic   EYES: Eyes have anicteric sclerae.    NEUROLOGIC: Grossly nonfocal.         Laboratory Data:     No results found for: ACD4    Inflammatory Markers    Recent Labs   Lab Test 05/27/23  0654 05/25/23  1059 12/10/22  0616 12/08/22  1256 08/17/21  0807 02/16/21  2219 02/15/21  1910   SED  --   --   --  49*  --  72* 47*   CRP  --   --   --   --  4.9 270.0* 270.0*   CRPI 27.60* 14.60*   < > 31.50*  --   --   --     < > = values in this interval not displayed.       Immune Globulin Studies   No lab results found.    Metabolic Studies    Recent Labs   Lab Test 07/10/23  0959 07/03/23  1038 06/29/23  1102  06/29/23  0736 06/29/23  0510 06/28/23  2131 06/28/23  1901 06/28/23  0719 06/28/23  0612 06/20/23  2141 06/20/23  1942 06/20/23  1934 04/30/23  1124 04/30/23  0824 03/24/23  0111 03/23/23  1735   NA  --   --   --   --  135*  --  132*  --  138   < >  --   --    < > 127*   < > 134*   POTASSIUM  --   --   --   --  3.3*  --  4.1   < > 3.3*   < > 3.6  --    < > 3.5   < > 3.7   CHLORIDE 93*   < >  --   --  103  --  101  --  104   < >  --   --    < > 96*   < > 97*   CO2  --   --   --   --  21*  --  20*  --  21*   < >  --   --    < > 16*   < > 25   ANIONGAP  --   --   --   --  11  --  11  --  13   < >  --   --    < > 15   < > 12   BUN  --   --   --   --  48.7*  --  55.8*  --  57.9*   < >  --   --    < > 85.0*   < > 47.3*   CR  --   --   --   --  2.04*  --  2.17*  --  2.36*   < >  --   --    < > 3.07*   < > 1.49*   17201 2.80*   < >  --   --   --   --   --   --   --   --   --   --    < >  --    < >  --    GFRESTIMATED  --   --   --   --  34*  --  32*  --  29*   < >  --   --    < > 21*   < > 50*   GLC  --   --  124*   < > 110*   < > 149*   < > 98   < >  --   --    < > 283*   < > 115*   CALOS  --   --   --   --  7.9*  --  8.1*  --  8.2*   < >  --   --    < > 8.2*   < > 8.1*   PHOS  --   --   --   --   --   --   --   --   --   --   --   --   --   --   --  3.3   MAG  --   --   --   --  1.6*  --   --   --  2.3   < > 2.5*  --    < > 2.0   < > 2.1   URIC  --   --   --   --   --   --   --   --   --   --  7.0  --   --   --   --   --    LACT  --   --   --   --   --   --   --   --   --   --   --  1.1   < >  --    < >  --    CKT  --   --   --   --   --   --   --   --   --   --   --   --   --  77   < >  --     < > = values in this interval not displayed.       Hepatic Studies    Recent Labs   Lab Test 06/26/23  0434 06/24/23  0536 06/21/23  0608 06/20/23  1550 05/30/23  0509 05/29/23  0534 05/26/23  0400 05/25/23  1059   BILITOTAL  --   --   --  0.4  --  0.6  --  0.6   DBIL  --   --   --   --   --  0.24  --   --    ALKPHOS  --   --   --   135*  --  170*  --  140*   PROTTOTAL  --   --   --  7.0  --  7.7  --  6.8   ALBUMIN  --   --   --  3.3*  --  3.7  --  3.1*   AST  --   --   --  46*  --  69*  --  66*   ALT  --   --   --  44  --  50  --  52*   * 401*   < > 405*   < > 378*   < > 320*    < > = values in this interval not displayed.       Pancreatitis testing    Recent Labs   Lab Test 06/21/23  0608 09/03/22  0630   TRIG 64 53       Lipid testing    Recent Labs   Lab Test 06/21/23  0608 09/03/22  0630 02/08/20  0408   CHOL 143 103 118   HDL 64 50 71   LDL 66 42 35   TRIG 64 53 57       Gout Labs      Recent Labs   Lab Test 06/20/23  1942 02/10/22  0730 02/15/21  1910 02/08/20  0408   URIC 7.0 4.2 6.8 7.5*       Hematology Studies   Recent Labs   Lab Test 07/10/23  0959 07/03/23  1038 06/26/23  0434 06/24/23  0536 06/23/23  0544 06/22/23  0515 06/21/23  0911 06/20/23  1550 05/25/23  1546 05/25/23  1059 03/09/21  0510 03/07/21  0543 03/06/21  0430   WBC  --   --  10.1 10.9 10.0 9.5   < > 14.7*   < > 9.0   < > 4.5 4.4   81553 8.5   < >  --   --   --   --   --   --    < >  --    < >  --   --    ANEU  --   --   --   --   --   --   --   --   --   --   --  2.6 2.8   ANEUTAUTO  --   --   --   --   --   --   --  11.8*  --  7.6   < >  --   --    ALYM  --   --   --   --   --   --   --   --   --   --   --  0.9 0.7*   ALYMPAUTO  --   --   --   --   --   --   --  0.7*  --  0.6*   < >  --   --    GIULIANO  --   --   --   --   --   --   --   --   --   --   --  0.7 0.6   AMONOAUTO  --   --   --   --   --   --   --  1.5*  --  0.6   < >  --   --    AEOS  --   --   --   --   --   --   --   --   --   --   --  0.3 0.2   AEOSAUTO  --   --   --   --   --   --   --  0.4  --  0.1   < >  --   --    ABSBASO  --   --   --   --   --   --   --  0.1  --  0.0   < >  --   --    HGB  --   --  8.6* 9.1* 9.1* 9.0*   < > 8.5*   < > 7.3*   < > 7.9* 8.0*   85298 9.9*   < >  --   --   --   --   --   --    < >  --    < >  --   --    HCT  --   --  28.9* 29.6* 29.1* 29.9*   < > 28.0*   < >  24.7*   < > 25.9* 25.3*   PLT  --   --  188 205 215 201   < > 181   < > 191   < > 208 196   17461 227   < >  --   --   --   --   --   --    < >  --    < >  --   --     < > = values in this interval not displayed.       Clotting Studies    Recent Labs   Lab Test 07/06/23  1002 06/29/23  0510 06/28/23  0612 06/27/23  0608 03/25/23  0910 03/24/23  0749   INR 1.5* 1.98* 1.87* 1.98*   < > 2.40*   PTT  --   --   --   --   --  43*    < > = values in this interval not displayed.       Iron Testing    Recent Labs   Lab Test 06/26/23  0434 05/20/23  0857 05/20/23  0039 04/19/23  0457 04/18/23  0535 05/03/22  0943 02/10/22  0730 02/08/20  0932 02/08/20  0408   IRON  --   --  26*  --  28*  --  34*   < > 25*   FEB  --   --  260  --  254  --  244   < > 306   IRONSAT  --   --  10*  --  11*  --  14*   < > 8*   HEAVENLY  --   --  222  --  167  --  124   < > 189      < >  --    < > 90   < > 85   < > 87   FOLIC  --   --  >40.0*  --   --   --   --   --   --    B12  --   --  1,493*  --   --   --   --   --  752    < > = values in this interval not displayed.       Markers  No lab results found.    Invalid input(s): FETOPROTEIN, SERUM, AFP    Autoimmune Testing   No lab results found.    Invalid input(s): ANCAB, PANCA, CANCA    Arterial Blood Gas Testing    Recent Labs   Lab Test 04/21/23  0353 09/03/22  0630 03/11/21  1330 02/25/21  0915 02/22/21  1539 02/22/21  0353 02/21/21  0854 02/21/21  0335 02/20/21  1544 02/20/21  1303 02/20/21  1037 02/20/21  0912 02/20/21  0345   PH  --   --   --   --   --  7.46*  --  7.43  --  7.38 7.41  --  7.45   PCO2  --   --   --   --   --  34*  --  37  --  43 38  --  36   PO2  --   --   --   --   --  70*  --  63*  --  125* 107*  --  124*   HCO3  --   --   --   --   --  24  --  25  --  25 24  --  25   O2PER 21 0 21 21   < > 21.0  21.0   < > 21.0  21.0   < > 40 40   < > 40.0  40.0    < > = values in this interval not displayed.        Thyroid Studies     Recent Labs   Lab Test 06/27/23  0608  05/29/23  0534 05/25/23  1059 05/21/23  0558 04/29/23  0503   TSH 9.14* 12.30* 11.30* 8.09* 7.78*   T4 1.34 1.48 1.21 0.97 0.76*       Urine Studies     Recent Labs   Lab Test 06/20/23 2010 05/25/23  1641 05/20/23  2050 04/18/23  0548 02/16/21  0225   URINEPH 5.0 6.5 6.5 5.0 5.5   NITRITE Negative Negative Negative Negative Negative   LEUKEST Negative Negative Negative Negative Negative   WBCU <1 1 <1 <1 0       Medication levels    Recent Labs   Lab Test 12/10/22  1948 02/14/20  0331 02/13/20  2147   VANCOMYCIN 11.3   < >  --    TOBRA  --   --  13.0    < > = values in this interval not displayed.       CSF testing   No lab results found.    Invalid input(s): CADAM, EVPCR, ENTPCR, ENTEROVIRUS    Microbiology:  Fungal testing  No lab results found.    Invalid input(s): HIFUN, FUNGL    Beta D Glucan levels (Fungitell assay)    No results found for: FGTL, FGTLI     Last Culture results   Culture   Date Value Ref Range Status   06/20/2023 No Growth  Final   06/20/2023 No Growth  Final   05/27/2023 No Growth  Final   05/25/2023 No Growth  Final   05/25/2023 No Growth  Final   05/11/2023 3+ Pseudomonas aeruginosa (A)  Final   05/11/2023 2+ Pseudomonas aeruginosa (A)  Final   03/24/2023 3+ Pseudomonas aeruginosa (A)  Final   03/24/2023 3+ Pseudomonas aeruginosa (A)  Final   03/24/2023 3+ Pseudomonas aeruginosa (A)  Final   03/24/2023 1+ Staphylococcus warneri (A)  Final     Comment:     Susceptibilities not routinely done, refer to antibiogram to view typical susceptibility profiles   03/02/2023 1+ Pseudomonas aeruginosa (A)  Final   03/02/2023 1+ Pseudomonas aeruginosa (A)  Final   03/02/2023 1+ Staphylococcus aureus (A)  Corrected     Comment:     Susceptibility testing requested by Sunni Jimenez pharmacist ([id number]) to check slightly elevated vanco result. 3.8.23 at 1047  Susceptibility testing requested by Sunni Jimenez, pharmacist for dalbavancin.    03/02/2023 1+ Staphylococcus aureus (A)  Final   03/02/2023 No anaerobic  organisms isolated  Final   12/11/2022 No anaerobic organisms isolated  Final   12/11/2022 No Growth  Final   12/11/2022 1+ Staphylococcus aureus (A)  Final     Comment:     Susceptibilities done on previous cultures   12/11/2022 1+ Staphylococcus aureus (A)  Final     Comment:     Susceptibilities done on previous cultures     Culture Micro   Date Value Ref Range Status   02/17/2021 No growth  Final   02/17/2021 No growth  Final   02/16/2021 Light growth  Enterococcus faecium   (A)  Final   02/16/2021 (A)  Final    Legionella pneumophila  isolated  Sent to [facility] for serotyping     02/16/2021   Final    Critical Value/Significant Value, preliminary result only, called to and read back by  Shobha Pedro RN on 2.23.21 at 1401. bw     02/16/2021 (A)  Final    Report received from the [place] [facility]:  Legionella pneumophila serogroup 1  This test was developed and its performance characteristics determined by the [facility].  It has not been cleared or approved by the U.S. Food and Drug   Administration:21CFR 809.30(e).  The FDA has determined that such clearance is not   necessary.     02/15/2021 Moderate growth  Staphylococcus aureus   (A)  Final   02/15/2021 Moderate growth  Strain 2  Staphylococcus aureus   (A)  Final   02/15/2021 Moderate growth  Strain 3  Staphylococcus aureus   (A)  Final   02/15/2021 Light growth  Normal skin freeman    Final         Last checks of Clostridioides difficile testing  Recent Labs   Lab Test 04/28/23  1858 11/15/20  1445   CDBPCT Negative Negative       No components found for: AFBSTN    Syphilis Testing  Invalid input(s): [id number]    Tick Testing  No lab results found.    Invalid input(s): APHAGM    ASO Testing  Invalid input(s): [id number]    Quantiferon testing   Recent Labs   Lab Test 06/20/23  1550 05/25/23  1059 02/13/20  1030 02/12/20  0440 02/08/20  0408   TBRES  --   --   --  Negative Negative   LYMPH 5 7   < > 4.8 17.5    < > = values in this  interval not displayed.       Infection Studies to assess Diarrhea  Recent Labs   Lab Test 06/25/23  1340   EPSTX1 Not Detected   EPSTX2 Not Detected   EPCAMP Not Detected   EPSALM Not Detected   EPSHGL Not Detected   EPVIB Not Detected   EPROTA Not Detected   EPNORO Not Detected   EPYER Not Detected       Virology:  Coronavirus-19 testing    Recent Labs   Lab Test 04/17/23  1716 03/23/23  1735 02/10/23  1040 02/07/23  2221 09/02/22  1749 02/07/22  1456 01/03/22  1011 08/17/21  1229 03/15/21  1612 03/09/21  1335 01/05/21  1521 11/14/20  2140   [id number]  --   --   --   --   --   --   --   --  Positive  --   --   --    [id number]  --   --   --   --   --   --   --   --  1:100  --   --   --    [id number] Negative Negative Negative Negative   < >  --   --   --   --  NEGATIVE   < > Test received-See reflex to IDDL test SARS CoV2 (COVID-19) Virus RT-PCR  NEGATIVE   [id number]  --   --   --   --   --   --   --   --   --  Nasopharyngeal   < > Nasopharyngeal   [id number]  --   --   --   --   --   --   --   --   --   --   --  Nasopharyngeal   COVIDPCREXT  --   --   --   --   --  Detected*  Detected* POSITIVE*  POSITIVE*  --   --   --   --   --    [id number]  --   --   --   --   --   --   --  Negative  --   --   --   --    [id number]  --   --   --   --   --   --   --  <0.40  --   --   --   --     < > = values in this interval not displayed.       Respiratory virus (non-coronavirus-19) testing    Recent Labs   Lab Test 02/15/21  2058   IFLUA Not Detected   FLUAH1 Not Detected   XJ0154 Not Detected   FLUAH3 Not Detected   IFLUB Not Detected   PIV1 Not Detected   PIV2 Not Detected   PIV3 Not Detected   PIV4 Not Detected   RSVA Not Detected   RSVB Not Detected   HMPV Not Detected   RHINEV Not Detected   ADENOV Not Detected   BUCHANAN Not Detected       CMV viral loads  No results found for: CMVQNT, CMVRESINST, CMVLOG, 79709, 33302, 90209, 84855    CMV resistance testing  No lab results found.  No results found for: CMVCID,  CMVFOS, CMVGAN    No results found for: H6RES    No results found for: EBVDN, EBRES, EBVDN, EBVSP, EBVPC, EBVPCR    BK viral loads No lab results found.    Parvovirus Testing  No lab results found.    Invalid input(s): PRVRES    Adenovirus Testing  No lab results found.    Invalid input(s): ADENAB, ADENOVIRUS, ADQT    Hepatitis B Testing     Recent Labs   Lab Test 04/27/23  0708 02/28/21  1155 02/12/20  0440   AUSAB  --  0.00 0.69   HBCAB Nonreactive  --  Nonreactive   HEPBANG Nonreactive Nonreactive Nonreactive     Was the last Hepatitis B E antigen positive?   No results found for: HBEAGN     Hepatitis C Antibody   Date Value Ref Range Status   04/27/2023 Nonreactive Nonreactive Final   02/12/2020 Nonreactive NR^Nonreactive Final     Comment:     Assay performance characteristics have not been established for newborns,   infants, and children     02/08/2020 Nonreactive NR^Nonreactive Final     Comment:     Assay performance characteristics have not been established for newborns,   infants, and children         CMV Antibody IgG   Date Value Ref Range Status   02/12/2020 >8.0 (H) 0.0 - 0.8 AI Final     Comment:     Positive  Antibody index (AI) values reflect qualitative changes in antibody   concentration that cannot be directly associated with clinical condition or   disease state.     02/08/2020 7.8 (H) 0.0 - 0.8 AI Final     Comment:     Positive  Antibody index (AI) values reflect qualitative changes in antibody   concentration that cannot be directly associated with clinical condition or   disease state.       Varicella Zoster Virus Antibody IgG   Date Value Ref Range Status   02/12/2020 2.0 (H) 0.0 - 0.8 AI Final     Comment:     Positive, suggests prev. exposure and probable immunity  Antibody index (AI) values reflect qualitative changes in antibody   concentration that cannot be directly associated with clinical condition or   disease state.     02/08/2020 2.4 (H) 0.0 - 0.8 AI Final     Comment:      Positive, suggests prev. exposure and probable immunity  Antibody index (AI) values reflect qualitative changes in antibody   concentration that cannot be directly associated with clinical condition or   disease state.       Toxoplasma Antibody IgG   Date Value Ref Range Status   02/12/2020 <3.0 0.0 - 7.1 IU/mL Final     Comment:     Negative- Absence of detectable Toxoplasma gondii IgG antibodies. A negative   result does not rule out acute infection.  The magnitude of the measured result is not indicative of the amount of   antibody present. The concentrations of anti-Toxoplasma gondii IgG in a given   specimen determined with assays from different manufacturers can vary due to   differences in assay methods and reagent specificity.     02/08/2020 <3.0 0.0 - 7.1 IU/mL Final     Comment:     Negative- Absence of detectable Toxoplasma gondii IgG antibodies. A negative   result does not rule out acute infection.  The magnitude of the measured result is not indicative of the amount of   antibody present. The concentrations of anti-Toxoplasma gondii IgG in a given   specimen determined with assays from different manufacturers can vary due to   differences in assay methods and reagent specificity.       No results found for: H1IGG, H2IGG, EBVCAM    No components found for: NCX3983    Last Pathology Report   Case Report   Date Value Ref Range Status   05/22/2023   Final    Surgical Pathology Report                         Case: [id number]                                  Authorizing Provider:  Myles Mota DO          Collected:           05/22/2023 12:55 PM          Ordering Location:     [facility]          Received:            05/22/2023 03:03 PM                                 Endoscopy                                                                    Pathologist:           Anthony Chilel DO                                                            Specimens:   A) - Small Intestine, Duodenum, Duodenal  biopsy                                                     B) - Other, gastric polyp biopsy                                                                    C) - Other, gastric biopsies                                                                        D) - Large Intestine, Colon, Ascending, Colon polyp                                                 E) - Large Intestine, Colon, Transverse, Colon polyp                                                F) - Large Intestine, Colon, Sigmoid, Sigmoid colon polyp                                   Clinical Information   Date Value Ref Range Status   05/22/2023   Final     Iron (Fe) deficiency anemia        Final Diagnosis   Date Value Ref Range Status   05/22/2023   Final    A. DUODENUM, BIOPSY:  - Unremarkable duodenal mucosa  - No evidence of celiac disease    B. GASTRIC, POLYP:  - Gastric hyperplastic polyp with ulceration  - No H. pylori-like organisms identified on routine staining  - No intestinal metaplasia identified    C. GASTRIC, BIOPSY:  - Reactive gastropathy with mucosal iron deposition  - No H. pylori-like organisms identified on routine staining  - No intestinal metaplasia identified    D. ASCENDING COLON, POLYP:  - Unremarkable colonic mucosa  - No histologic evidence of a polyp identified    E. TRANSVERSE COLON, POLYP:  - Tubular adenoma  - No high-grade dysplasia or malignancy identified    F. SIGMOID COLON, POLYP:  - Tubular adenoma  - No high-grade dysplasia or malignancy identified             Imaging:  Results for orders placed or performed during the hospital encounter of 06/20/23   XR Chest 2 Views    Narrative    XR CHEST 2 VIEWS  6/20/2023 2:44 PM      HISTORY: SOB    COMPARISON: 5/25/2023, 4/17/2023    FINDINGS: PA and lateral views. Stable LVAD and left chest wall  automatic implantable cardiac defibrillator lead. Right arm PICC tip  projects over the right atrium. Sternotomy is intact. Stable enlarged  cardiac silhouette. Improved streaky  perihilar opacities compared to  5/25/2023. No substantial pleural effusion or pneumothorax.      Impression    IMPRESSION:   No acute airspace opacities. Improved streaky perihilar  atelectasis/edema compared to 5/25/2023    I have personally reviewed the examination and initial interpretation  and I agree with the findings.    QUIQUE NICHOLS MD         SYSTEM ID:  [id number]   XR Chest Port 1 View    Narrative    XR CHEST PORT 1 VIEW  6/22/2023 4:27 PM      HISTORY: s/p picc    COMPARISON: 6/20/2023    FINDINGS: AP view. Right arm PICC tip projects over the right atrium.  Stable left chest wall implantable cardiac defibrillator and LVAD.  Median sternotomy. The cardiac silhouette is stable. Stable streaky  perihilar opacities. No pleural effusion or pneumothorax.      Impression    IMPRESSION:   1. Right arm PICC tip projects over the right atrium.  2. Stable streaky perihilar atelectasis/edema.     I have personally reviewed the examination and initial interpretation  and I agree with the findings.    LUPE MASON MD         SYSTEM ID:  [id number]   Cardiac Catheterization    Narrative      Right sided filling pressures are moderately elevated.    Left sided filling pressures are moderately elevated.    Mild elevated pulmonary hypertension.     Cardiac Device Check - Inpatient     Value    Date Time Interrogation Session [id number]    Implantable Pulse Generator  Medtronic    Implantable Pulse Generator Model [id number] Visia AF MRI VR    Implantable Pulse Generator Serial Number [id number]    Type Interrogation Session In Clinic    Clinic Name [facility]    Implantable Pulse Generator Type Defibrillator    Implantable Pulse Generator Implant Date 20200316    Implantable Lead  Medtronic    Implantable Lead Model 6935M Sprint Quattro Secure S MRI SureScan    Implantable Lead Serial Number [id number]    Implantable Lead Implant Date 20200316    Implantable Lead  Polarity Type Tripolar Lead    Implantable Lead Location Detail 1 UNKNOWN    Implantable Lead Special Function 62cm length    Implantable Lead Location Right Ventricle    Glenn Setting Mode (NBG Code) VVIR    Glenn Setting Lower Rate Limit 60    Glenn Setting Maximum Sensor Rate 115    Glenn Setting Hysterisis Rate DISABLED    Lead Channel Setting Sensing Polarity Bipolar    Lead Channel Setting Sensing Anode Location Right Ventricle    Lead Channel Setting Sensing Anode Terminal Ring    Lead Channel Setting Sensing Cathode Location Right Ventricle    Lead Channel Setting Sensing Cathode Terminal Tip    Lead Channel Setting Sensing Sensitivity 0.3    Lead Channel Setting Pacing Polarity Bipolar    Lead Channel Setting Pacing Anode Location Right Ventricle    Lead Channel Setting Pacing Anode Terminal Ring    Lead Channel Setting Sensing Cathode Location Right Ventricle    Lead Channel Setting Sensing Cathode Terminal Tip    Lead Channel Setting Pacing Pulse Width 0.4    Lead Channel Setting Pacing Amplitude 2    Lead Channel Setting Pacing Capture Mode Adaptive    Zone Setting Type Category VF    Zone Setting Detection Interval 270    Zone Setting Detection Beats Numerator 30    Zone Setting Detection Beats Denominator 40    Zone Setting Type Category VT    Zone Setting Detection Interval Blank    Zone Setting Type Category VT    Zone Setting Detection Interval 460    Zone Setting Type Category VT    Zone Setting Detection Interval 500    Zone Setting Type Category ATRIAL_FIBRILLATION    Zone Setting Type Category AT/AF    Lead Channel Impedance Value 399    Lead Channel Impedance Value 304    Lead Channel Sensing Intrinsic Amplitude 10.25    Lead Channel Pacing Threshold Amplitude 0.75    Lead Channel Pacing Threshold Pulse Width 0.4    Battery Date Time of Measurements [id number]    Battery Status Middle of Service    Battery RRT Trigger 2.727    Battery Remaining Longevity 100    Battery Voltage 2.99     Capacitor Charge Type Reformation    Capacitor Last Charge Date Time 45436806481613    Capacitor Charge Time 3.743    Capacitor Charge Energy 18    Glenn Statistic Date Time Start 20230501154556    Glenn Statistic Date Time End 20230621080336    Glenn Statistic RV Percent Paced 0.36    Atrial Tachy Statistic Date Time Start 20230501154556    Atrial Tachy Statistic Date Time End 20230621080336    Atrial Tachy Statistic AT/AF [place] Percent 0.3    Therapy Statistic Recent Shocks Delivered 0    Therapy Statistic Recent Shocks Aborted 0    Therapy Statistic Recent ATP Delivered 0    Therapy Statistic Recent Date Time Start 20230501154556    Therapy Statistic Recent Date Time End 20230621080336    Therapy Statistic Total Shocks Delivered 0    Therapy Statistic Total Shocks Aborted 0    Therapy Statistic Total ATP Delivered 0    Therapy Statistic Total  Date Time Start 94709067748515    Therapy Statistic Total  Date Time End 74944751648175    Episode Statistic Recent Count 17    Episode Statistic Type Category AT/AF    Episode Statistic Recent Count 0    Episode Statistic Type Category SVT    Episode Statistic Recent Count 0    Episode Statistic Type Category VT    Episode Statistic Recent Count 0    Episode Statistic Type Category VF    Episode Statistic Recent Count 0    Episode Statistic Type Category VT    Episode Statistic Recent Count 0    Episode Statistic Type Category VT    Episode Statistic Recent Count 0    Episode Statistic Type Category VT    Episode Statistic Recent Date Time Start 24340054506988    Episode Statistic Recent Date Time End 76911456573431    Episode Statistic Recent Date Time Start 27819298766433    Episode Statistic Recent Date Time End 40974654320472    Episode Statistic Recent Date Time Start 87523777259076    Episode Statistic Recent Date Time End 84086090779041    Episode Statistic Recent Date Time Start 22733877112034    Episode Statistic Recent Date Time End 31504407007734    Episode  Statistic Recent Date Time Start 85031459179022    Episode Statistic Recent Date Time End 45190321142711    Episode Statistic Recent Date Time Start 20673239393714    Episode Statistic Recent Date Time End 37542675168005    Episode Statistic Recent Date Time Start 87494497738459    Episode Statistic Recent Date Time End 02563338919578    Episode Statistic Total Count 259    Episode Statistic Type Category AT/AF    Episode Statistic Total Count 0    Episode Statistic Type Category SVT    Episode Statistic Total Count 0    Episode Statistic Type Category VT    Episode Statistic Total Count 0    Episode Statistic Type Category VF    Episode Statistic Total Count 0    Episode Statistic Type Category VT    Episode Statistic Total Count 0    Episode Statistic Type Category VT    Episode Statistic Total Count 2    Episode Statistic Type Category VT    Episode Statistic Total Date Time Start 79200711390501    Episode Statistic Total Date Time End 04657456070935    Episode Statistic Total Date Time Start 11706704587675    Episode Statistic Total Date Time End 06282309186907    Episode Statistic Total Date Time Start 01055662313780    Episode Statistic Total Date Time End 77819963197895    Episode Statistic Total Date Time Start 45650444684817    Episode Statistic Total Date Time End 17394681576143    Episode Statistic Total Date Time Start 74508551894010    Episode Statistic Total Date Time End 67085439088122    Episode Statistic Total Date Time Start 01118291687072    Episode Statistic Total Date Time End 09250130332898    Episode Statistic Total Date Time Start 77333789824483    Episode Statistic Total Date Time End 68830699856964    Episode Identifier 261    Episode Type Category Monitor    Episode Date Time 98315673090653    Episode Duration 360    Episode Identifier 260    Episode Type Category Monitor    Episode Date Time 27767873180825    Episode Duration 480    Episode Identifier 259    Episode Type Category Monitor     Episode Date Time 20230608072710    Episode Duration 360    Episode Identifier 258    Episode Type Category Monitor    Episode Date Time 81941604116145    Episode Duration 600    Episode Identifier 257    Episode Type Category Monitor    Episode Date Time [id number]    Episode Duration 360    Episode Identifier 256    Episode Type Category Monitor    Episode Date Time [id number]    Episode Duration 960    Episode Identifier 255    Episode Type Category Monitor    Episode Date Time 27781135084335    Episode Duration 720    Episode Identifier 254    Episode Type Category Monitor    Episode Date Time 43941740614797    Episode Duration 360    Episode Identifier 253    Episode Type Category Monitor    Episode Date Time 02612562459588    Episode Duration 600    Episode Identifier 252    Episode Type Category Monitor    Episode Date Time 26153127785665    Episode Duration 360    Episode Identifier 251    Episode Type Category Monitor    Episode Date Time [id number]    Episode Duration 480    Episode Identifier 250    Episode Type Category Monitor    Episode Date Time 66874363821766    Episode Duration 480    Episode Identifier 249    Episode Type Category Monitor    Episode Date Time 14091705331312    Episode Duration 1,800    Episode Identifier 248    Episode Type Category Monitor    Episode Date Time 19734060206340    Episode Duration 1,200    Episode Identifier 247    Episode Type Category Monitor    Episode Date Time [id number]    Episode Duration 1,560    Episode Identifier 246    Episode Type Category Monitor    Episode Date Time [id number]    Episode Duration 360    Episode Identifier 245    Episode Type Category Monitor    Episode Date Time [id number]    Episode Duration 480    Narrative    Patient seen on [address] for evaluation and iterative programming of their ICD per MD orders.     Device: Medtronic [id number] Visia AF MRI VR  Normal device function.  Mode: VVI 40 bpm  :  0.4%  Intrinsic rhythm:  bpm  Thoracic Impedance: Below reference line, suggests possible intrathoracic fluid accumulation.  Short V-V intervals: 0  Lead Trends Appear Stable:.  Estimated battery longevity to RRT = 8.3 years. Battery voltage = 3.01 V.   Atrial Arrhythmia: 17 AT/AF episodes detected, longest episode 30 min, Avg V rate 100 bpm.  AF [place]: 0.3%  Anticoagulant: Warfarin  Ventricular Arrhythmia: None  Setting Changes: None    Plan: Continue to follow patient throughout hospitalization then, device follow-up every 3 months.    Candis Gaytan RN    Single lead ICD    I have reviewed and interpreted the device interrogation, settings, programming and nurse's summary. The device is functioning within normal device parameters. I agree with the current findings, assessment and plan.     *Note: Due to a large number of results and/or encounters for the requested time period, some results have not been displayed. A complete set of results can be found in Results Review.         Virtual Visit Details    Type of service:  Video Visit   Video Start Time:8.28  Video End Time:8:41 AM    Originating Location (pt. Location): Home    Distant Location (provider location):  On-site  Platform used for Video Visit: Mary     LVAD

## 2023-07-17 NOTE — PROGRESS NOTES
[facility] - CardioMEMS Reading Review    July 16th, 2023   Cardiomems period: 6/17/23 - 7/16/2023    CardioMEMS reviewed and routed to patient's provider, Laine BARRY NP.     Current diuretic:  Torsemide 100 TID, Hydrochlorothiazide 75mg daily  Current potassium chloride dose:  Potassium 100mEq TID    Symptoms: Pt continues to report baseline fatigue. Denies swelling and other heart failure symptoms.  Weights: 184-191    Recent plan of care changes: Pt was taken off IV Bumex and started on IV Dobutamine. Potassium was increased.    Current Threshold parameters: 21 - 24    Today's Waveform:       Reading Ranges:           RHC Date Wedge Pressure MEMS PAD during cath  (average of the 3 values)     Sept 20, 2022 w/MEMS implant     15 mmHg   16 mmHg

## 2023-07-18 NOTE — PROGRESS NOTES
[facility] - CardioMEMS Reading Review    July 18, 2023, 1210   CardioMems period: 7/17/2023 - 8/16/2023       CardioMEMS reviewed and routed to patient's provider, Francoise RENDON NP.     Current diuretic:  Torsemide 100 TID, Hydrochlorothiazide 75mg daily  Current potassium chloride dose:  Potassium 100mEq TID    Symptoms: No changes, pt continues to only endorse mild fatigue  Weights: (Dry weight ~180 lbs) Today, going back: 191, 191, 191, 187, 186, 187  Labs:      Changes to plan of care today: Metolazone 2.5mg X1 with an additional 40mEq of Potassium.  Repeat labs Monday as previously arranged.    Current Threshold parameters: 21 - 24    Today's Waveform:       Readings:         [facility] Date Wedge Pressure MEMS PAD during cath  (average of the 3 values)     Sept 20, 2022 w/MEMS implant     15 mmHg   16 mmHg

## 2023-07-18 NOTE — TELEPHONE ENCOUNTER
EP called 7/18 to sched a 8 week follow up in the LVAD per checkout notes from 7/14. The appt is set for 9/8 via video.

## 2023-07-20 NOTE — PROGRESS NOTES
ANTICOAGULATION MANAGEMENT     Eliseo Tanner 68 year old male is on warfarin with therapeutic INR result. (Goal INR 1.7-2.3)    Recent labs: (last 7 days)     07/20/23  1709   INR 1.8*       ASSESSMENT       Source(s): Chart Review and Patient/Caregiver Call       Warfarin doses taken: Warfarin taken as instructed    Diet: No new diet changes identified    Medication/supplement changes: Metolazone started on 7/19/23 No interaction anticipated     Continuous Dobutamine gtt.    New illness, injury, or hospitalization: No    Signs or symptoms of bleeding or clotting: No    Previous result: Therapeutic last 2(+) visits    Additional findings: None         PLAN     Recommended plan for no diet, medication or health factor changes affecting INR     Dosing Instructions: Continue your current warfarin dose with next INR in 1 week       Summary  As of 7/20/2023    Full warfarin instructions:  2 mg every Mon, Wed, Fri; 4 mg all other days   Next INR check:  7/27/2023             Telephone call with Chapis who verbalizes understanding and agrees to plan and who agrees to plan and repeated back plan correctly    Check at provider office visit    Education provided:     Interaction IS NOT anticipated between warfarin and Dobutamine    Symptom monitoring: monitoring for bleeding signs and symptoms, monitoring for clotting signs and symptoms, when to seek medical attention/emergency care and if you hit your head or have a bad fall seek emergency care    Importance of notifying anticoagulation clinic for: changes in medications; a sooner lab recheck maybe needed, diarrhea, nausea/vomiting, reduced intake, cold/flu, and/or infections; a sooner lab recheck maybe needed and if you did not receive dosing instructions on the same day as your labs were checked    Plan made per [facility] anticoagulation protocol and per LVAD protocol    Young Bustos RN  Anticoagulation  Clinic  7/20/2023    _______________________________________________________________________     Anticoagulation Episode Summary     Current INR goal:  1.7-2.3   TTR:  49.5 % (8.2 mo)   Target end date:  Indefinite   Send INR reminders to:  ANTICOAG LVAD    Indications    LVAD (left ventricular assist device) present (H) [Z95.811]  Chronic systolic congestive heart failure (H) [I50.22]  Paroxysmal atrial fibrillation (H) [I48.0]           Comments:  Follow VAD Anticoag protocol:Yes: HeartMate 3   Bridging: Call MD each time   Date VAD placed: 2/18/2020 5/6/22 [employer]         Anticoagulation Care Providers     Provider Role Specialty Phone number    Nader Cisneros MD Referring Cardiovascular Disease [phone]

## 2023-07-20 NOTE — PROGRESS NOTES
ANTICOAGULATION CLINIC REFERRAL RENEWAL REQUEST       An annual renewal order is required for all patients referred to [facility].?  Please review and sign the pended referral order for Eliseo Tanner.       ANTICOAGULATION SUMMARY      Warfarin indication(s)   LVAD    Mechanical heart valve present?  NO       Current goal range   INR: 1.7-2.3     Goal appropriate for indication? Goal INR 1.7-2.3 appropriate for indication above     Time in Therapeutic Range (TTR)  (Goal > 60%) 49.5%       Office visit with referring provider's group within last year yes on 6/14/23       oYung Bustos, RN  [facility]

## 2023-07-24 NOTE — TELEPHONE ENCOUNTER
Appt scheduled and patient aware.       Vinnie Olivera RN  Infectious Disease on 7/24/2023 at 12:23 PM

## 2023-07-24 NOTE — PROGRESS NOTES
[facility] - CardioMEMS Reading Review    July 24, 2023, 0800   CardioMems period: 7/17/2023 - 8/16/2023       CardioMEMS reviewed and routed to patient's provider, Francoise RENDON NP.     Current diuretic:  Torsemide 100 TID, Hydrochlorothiazide 75mg daily  Current potassium chloride dose:  Potassium 100mEq TID    Symptoms: Feeling well, only complaint is baseline fatigue.  Weights: (Dry weight ~180 lbs) Today, going back: 193, 192, 191    Changes to plan of care today: Repeat Metolazone 2.5mg w/ Potassium 40 mEq X1 today.    Current Threshold parameters: 21 - 24    Today's Waveform:       Readings:         RHC Date Wedge Pressure MEMS PAD during cath  (average of the 3 values)     Sept 20, 2022 w/MEMS implant     15 mmHg   16 mmHg

## 2023-07-24 NOTE — TELEPHONE ENCOUNTER
Patient has appt in the [facility] on 7/27 and wondering if they could schedule a infusion while here. Patient is currently on cefepine due to renal com placations.     Cefepime 2 g IV every 24 hours.     Will check for them and see if they can get in around 1030.    Will send a staff message to see if patient can get into [facility] on the 27th.     Vinnie Olivera RN  Infectious Disease on 7/24/2023 at 8:06 AM

## 2023-07-26 NOTE — PROGRESS NOTES
D:  Rcvd MyChart message from pt/caregiver informing us that patient is not doing well.  Pt has worsened fatigue, shortness of breath, stamina, weights & weakness.  Weight up to 196 yesterday/ Dry weight 180lbs.    I:  Discussed direct admission with Dr. Cisneros who agree's that patient is in need of this intervention.   Discussed admission and timing with pt.  Per wife Veronique, pt agreeable to coming to the ER tomorrow sometime, for admission.  Informed Dr. Gomes, inpatient MD.  A:  Admission  P:  Pt verbalized understanding of the instructions given.  Will call VAD coordinator with further needs and questions.

## 2023-07-27 PROBLEM — E87.1 HYPONATREMIA: Status: ACTIVE | Noted: 2023-01-01

## 2023-07-27 NOTE — PROGRESS NOTES
"Provider notification:    \"Pt's MAP is 57, only one higher than an hour ago\"    Dose of dopamine ordered    Paged Cosme  "

## 2023-07-27 NOTE — ED TRIAGE NOTES
68 yr old male with LVAD - Hearmate 3, continuous dobutamine drip - double lumen PICC DINA, w/c to triage presenting with SOB x 4-5 days with weight increase from baseline. Denies LVAD alarms. Unable to obtain BP in triage.     Triage Assessment       Row Name 07/27/23 1021       Triage Assessment (Adult)    Airway WDL WDL       Respiratory WDL    Respiratory WDL X;rhythm/pattern    Rhythm/Pattern, Respiratory shortness of breath       Skin Circulation/Temperature WDL    Skin Circulation/Temperature WDL WDL       Cardiac WDL    Cardiac WDL X  LVAD - Heartmate 3       Peripheral/Neurovascular WDL    Peripheral Neurovascular WDL WDL       Cognitive/Neuro/Behavioral WDL    Cognitive/Neuro/Behavioral WDL WDL

## 2023-07-27 NOTE — MEDICATION SCRIBE - ADMISSION MEDICATION HISTORY
Medication Scribe Admission Medication History    Admission medication history is complete. The information provided in this note is only as accurate as the sources available at the time of the update.    Medication reconciliation/reorder completed by provider prior to medication history? No    Information Source(s): Patient and CareEverywhere/SureScripts via in-person    Pertinent Information: None    Changes made to PTA medication list:  Added: Amlodipine 5 mg, B complex-c-folic acid  Deleted: Ferrous sulfate 325 mg, Loperamide 2 mg  Changed: None    Medication Affordability:  Not including over the counter (OTC) medications, was there a time in the past 3 months when you did not take your medications as prescribed because of cost?: No    Allergies reviewed with patient and updates made in EHR: yes    Medication History Completed By: Gissel Herrera 7/27/2023 4:58 PM    Prior to Admission medications    Medication Sig Last Dose Taking? Auth Provider Long Term End Date   allopurinol (ZYLOPRIM) 100 MG tablet Take 2 tablets (200 mg) by mouth daily 7/27/2023 at am Yes Alberto Garnica MD     amiodarone (PACERONE) 200 MG tablet TAKE 1 TABLET BY MOUTH ONCE DAILY 7/27/2023 at unknown Yes Mervat Decker PA-C Yes    amLODIPine (NORVASC) 5 MG tablet Take 2 tablets by mouth daily at 2 pm Unknown at unknown Yes Reported, Patient No    ceFEPIme (MAXIPIME) 2 g vial Inject 2 g into the vein every 24 hours 7/27/2023 at 1650 Yes Antonette Aguiar APRN CNP     co-enzyme Q-10 200 MG CAPS Take 200 mg by mouth daily 7/27/2023 at am Yes Mono Gambino PA-C No    DOBUTamine 500 mg in dextrose 5% 250 mL (adult std conc) premix Inject 212 mcg/min into the vein continuous 7/27/2023 at unknown Yes Antonette Aguiar APRN CNP     finasteride (PROSCAR) 5 MG tablet Take 1 tablet (5 mg) by mouth daily Helps with urinary retention. 7/26/2023 at pm Yes Jess Meraz MD     folic acid-vit B6-vit B12 (FOLGARD) 0.8-10-0.115 MG TABS per tablet  Take 1 tablet by mouth daily 7/27/2023 at am Yes Reported, Patient     hydrALAZINE (APRESOLINE) 25 MG tablet Take 1 tablet (25 mg) by mouth every 8 hours 7/25/2023 at unknown Yes Antonette Aguiar APRN CNP Yes    hydrochlorothiazide (HYDRODIURIL) 25 MG tablet Take 3 tablets (75 mg) by mouth daily 7/26/2023 at pm Yes Nader Cisneros MD Yes    levothyroxine (SYNTHROID/LEVOTHROID) 100 MCG tablet Take 1 tablet (100 mcg) by mouth every morning (before breakfast) 7/26/2023 at pm Yes Jessica Dutton MD Yes    magnesium oxide (MAG-OX) 400 MG tablet Take 1 tablet (400 mg) by mouth 3 times daily 7/27/2023 at am Yes Antonette Aguiar APRN CNP     metolazone (ZAROXOLYN) 2.5 MG tablet Take 1 tablet (2.5 mg) by mouth daily as needed (for weight gain as directed by the VAD team) 7/26/2023 at unknown Yes Francoise Peters APRN CNP Yes    pantoprazole (PROTONIX) 40 MG EC tablet Take 1 tablet (40 mg) by mouth 2 times daily (before meals) 7/26/2023 at unknown Yes Antonette Aguiar APRN CNP     potassium chloride ER (KLOR-CON M) 20 MEQ CR tablet Take 100mEq in the morning, Take 100 mEq in the afternoon, Take 100 mEq in the PM 7/27/2023 at unknown Yes Antonette Aguiar APRN CNP No    QUEtiapine (SEROQUEL) 25 MG tablet Take 0.5 tablets (12.5 mg) by mouth At Bedtime 7/26/2023 at pm Yes Antonette Aguiar APRN CNP Yes    tamsulosin (FLOMAX) 0.4 MG capsule Take 0.8 mg by mouth every evening This med helps with urinary retention 7/26/2023 at unknown Yes Nader Cisneros MD     torsemide (DEMADEX) 100 MG tablet Take 1 tablet (100 mg) by mouth 3 times daily 7/26/2023 at pm Yes Nader Cisneros MD Yes    warfarin ANTICOAGULANT (COUMADIN) 4 MG tablet Take 2 mg by mouth on MWF, take 4 mg by mouth TThSatSun 7/26/2023 at pm Yes Antonette Aguiar, APRN CNP No

## 2023-07-27 NOTE — PROGRESS NOTES
"CLINICAL NUTRITION SERVICES - BRIEF NOTE    Received consult for Congestive Heart Failure (CHF) - Dietitian to instruct patient on 2 gram sodium diet (automatic consult on CAR Heart Failure Specialized Admission order set). Education not indicated at this time due to low sodium diet education offered/provided within the past 3 months. Per chart review - last low sodium diet education was 5/20/23 and per RD note 6/23/23 - \"Per discussion with pt 6/23, reports receiving low-sodium nutrition education in the past\".        RD will follow per LOS protocol or if re-consulted.     Anaid Garcia RDN, LD  6D/ED RD pager: [phone]  Weekend/Holiday RD pager: [phone]          "

## 2023-07-27 NOTE — PROGRESS NOTES
Pt requesting a dry line dressing change, RN called LVAD coordinator and they said they would not have time to help tonight. Flyer will come help w dressing change when they are free.

## 2023-07-27 NOTE — ED PROVIDER NOTES
ED Provider Note  [facility]      History   No chief complaint on file.    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 Patient reports increased fluid retention and weight gain over the past few days.  He reports mostly retaining fluid in his abdomen but notes his lower extremities are slightly more swollen.  He also endorses shortness of breath and weakness.  Patient denies experiencing issues with his LVAD.  He denies recent fevers or vomiting.  Patient has been taking his medications as prescribed.  Patient's spouse notes the symptoms seem to appear every 3 or 4 weeks.  He was most recently admitted to the hospital 1 month ago for similar symptoms.      Past Medical History  Past Medical History:   Diagnosis Date    Chronic systolic congestive heart failure (H)     History of implantable cardioverter-defibrillator (ICD) placement     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)     MSSA    Legionella pneumonia (H)     LVAD (left ventricular assist device) present (H)     MSSA bacteremia 11/2020     Past Surgical History:   Procedure Laterality Date    ANESTHESIA CARDIOVERSION N/A 02/28/2020    Procedure: ANESTHESIA, FOR CARDIOVERSION;  Surgeon: GENERIC ANESTHESIA PROVIDER;  Location: [facility] OR    COLONOSCOPY N/A 05/22/2023    Procedure: COLONOSCOPY, WITH POLYPECTOMY AND BIOPSY;  Surgeon: Myles Mota DO;  Location: [facility] GI    CV CARDIOMEMS WITH RIGHT HEART CATH N/A 09/20/2022    Procedure: Pulmonary Arterial Pressure Sensor Placement CPT Codes to be cleared by financial securing for this implant. 17831 and ;  Surgeon: Dalton Baeza MD;  Location: [facility] HEART CARDIAC CATH LAB    CV CENTRAL VENOUS CATHETER PLACEMENT N/A 02/13/2020     Procedure: Central Venous Catheter Placement;  Surgeon: Chente Moss MD;  Location:  HEART CARDIAC CATH LAB    CV INTRA AORTIC BALLOON N/A 02/07/2020    Procedure: Intra-Aortic Balloon Pump Insertion;  Surgeon: Jose Baldwin MD;  Location:  HEART CARDIAC CATH LAB    CV INTRA AORTIC BALLOON N/A 02/13/2020    Procedure: Intra-Aortic Balloon Pump Insertion;  Surgeon: Chente Moss MD;  Location:  HEART CARDIAC CATH LAB    CV RIGHT HEART CATH MEASUREMENTS RECORDED N/A 09/21/2020    Procedure: CV RIGHT HEART CATH;  Surgeon: Dalton Baeza MD;  Location:  HEART CARDIAC CATH LAB    CV RIGHT HEART CATH MEASUREMENTS RECORDED N/A 01/07/2021    Procedure: Right Heart Cath;  Surgeon: Chun Ball MD;  Location:  HEART CARDIAC CATH LAB    CV RIGHT HEART CATH MEASUREMENTS RECORDED N/A 02/10/2022    Procedure: CV RIGHT HEART CATH;  Surgeon: Dalton Baeza MD;  Location:  HEART CARDIAC CATH LAB    CV RIGHT HEART CATH MEASUREMENTS RECORDED N/A 09/20/2022    Procedure: Right Heart Catheterization [[id number]];  Surgeon: Dalton Baeza MD;  Location:  HEART CARDIAC CATH LAB    CV RIGHT HEART CATH MEASUREMENTS RECORDED N/A 12/12/2022    Procedure: Right Heart Cath;  Surgeon: Chente Moss MD;  Location:  HEART CARDIAC CATH LAB    CV RIGHT HEART CATH MEASUREMENTS RECORDED N/A 04/28/2023    Procedure: Right Heart Catheterization;  Surgeon: Aaliyah Fischer MD;  Location:  HEART CARDIAC CATH LAB    CV RIGHT HEART CATH MEASUREMENTS RECORDED N/A 06/21/2023    Procedure: Right Heart Catheterization;  Surgeon: Nader Cisneros MD;  Location:  HEART CARDIAC CATH LAB    CV SWAN LUCIANA PROCEDURE N/A 02/13/2020    Procedure: Berry Luciana Procedure;  Surgeon: Chente Moss MD;  Location:  HEART CARDIAC CATH LAB    EP ICD Bilateral 03/16/2020    Procedure: EP ICD;  Surgeon: Dali Day MD;  Location:  HEART CARDIAC CATH LAB     ESOPHAGOSCOPY, GASTROSCOPY, DUODENOSCOPY (EGD), COMBINED N/A 05/22/2023    Procedure: ESOPHAGOGASTRODUODENOSCOPY, WITH BIOPSY;  Surgeon: Myles Mota DO;  Location: [facility] GI    INCISION AND DRAINAGE CHEST WASHOUT, COMBINED N/A 12/11/2022    Procedure: INCISION AND DRAINAGE OF DRIVELINE;  Surgeon: Mac Jaramillo MD;  Location: [facility] OR    INSERT VENTRICULAR ASSIST DEVICE LEFT (HEARTMATE II) N/A 02/18/2020    Procedure: INSERTION, LEFT VENTRICULAR ASSIST DEVICE (HEARTMATE III);  Surgeon: Mac Jaramillo MD;  Location: [facility] OR    IR CVC TUNNEL PLACEMENT > 5 YRS OF AGE  02/23/2021    IR CVC TUNNEL REMOVAL RIGHT  03/16/2021    IR TRANSCATHETER BIOPSY  05/02/2023    MIDLINE INSERTION - DOUBLE LUMEN Left 12/15/2022    left basilic 5 fr dl midline 20 cm    PICC DOUBLE LUMEN PLACEMENT Right 06/22/2023    PICC rewired right basilic vein 42cm total 1cm external.    THORACOSCOPY Right 03/06/2020    Procedure: RIGHT VIDEO-ASSISTED THORASCOPIC SURGERY, EVACUATION OF HEMOTHORAX, PLACEMENT OF CHEST TUBES;  Surgeon: William Gan MD;  Location: [facility] OR     allopurinol (ZYLOPRIM) 100 MG tablet  amiodarone (PACERONE) 200 MG tablet  ceFEPIme (MAXIPIME) 2 g vial  co-enzyme Q-10 200 MG CAPS  DOBUTamine 500 mg in dextrose 5% 250 mL (adult std conc) premix  ferrous sulfate (FEROSUL) 325 (65 Fe) MG tablet  finasteride (PROSCAR) 5 MG tablet  hydrALAZINE (APRESOLINE) 25 MG tablet  hydrochlorothiazide (HYDRODIURIL) 25 MG tablet  levothyroxine (SYNTHROID/LEVOTHROID) 100 MCG tablet  loperamide (IMODIUM) 2 MG capsule  magnesium oxide (MAG-OX) 400 MG tablet  metolazone (ZAROXOLYN) 2.5 MG tablet  pantoprazole (PROTONIX) 40 MG EC tablet  potassium chloride ER (KLOR-CON M) 20 MEQ CR tablet  QUEtiapine (SEROQUEL) 25 MG tablet  tamsulosin (FLOMAX) 0.4 MG capsule  torsemide (DEMADEX) 100 MG tablet  warfarin ANTICOAGULANT (COUMADIN) 4 MG tablet      Allergies   Allergen Reactions    Heparin      HIT screen positive 2/14/20, reflex DAVINA  negative; however heme recommended treating as if positive  HIT screen negative 20    Chlorhexidine Rash    Oxycodone Other (See Comments) and Itching     Family History  No family history on file.  Social History   Social History     Tobacco Use    Smoking status: Former     Types: Cigarettes     Quit date: 1994     Years since quittin.3    Smokeless tobacco: Never   Vaping Use    Vaping Use: Never used   Substance Use Topics    Alcohol use: Not Currently    Drug use: Never      Past medical history, past surgical history, medications, allergies, family history, and social history were reviewed with the patient. No additional pertinent items.      A complete review of systems was performed with pertinent positives and negatives noted in the HPI, and all other systems negative.    Physical Exam      Physical Exam  Vitals and nursing note reviewed.   Constitutional:       General: He is not in acute distress.     Appearance: He is not ill-appearing, toxic-appearing or diaphoretic.   HENT:      Head: Normocephalic and atraumatic.   Eyes:      General: No scleral icterus.     Extraocular Movements: Extraocular movements intact.      Conjunctiva/sclera: Conjunctivae normal.   Cardiovascular:      Rate and Rhythm: Normal rate.      Heart sounds: Normal heart sounds.      Comments: Machine-like heart tones.  Pulmonary:      Effort: Pulmonary effort is normal. No respiratory distress.      Breath sounds: Normal breath sounds.   Abdominal:      Palpations: Abdomen is soft.      Tenderness: There is no abdominal tenderness.      Comments: Driveline insertion site is clean, dry, intact without erythema, induration, fluctuance, or drainage surrounding and from the site.  There is abdominal distention with edema but no tenderness.   Musculoskeletal:         General: No tenderness. Normal range of motion.      Cervical back: Normal range of motion.      Right lower leg: Edema present.      Left lower leg: Edema  present.   Skin:     General: Skin is warm.      Findings: No rash.   Neurological:      General: No focal deficit present.      Mental Status: He is alert and oriented to person, place, and time.   Psychiatric:         Mood and Affect: Mood normal.         Behavior: Behavior normal.         Thought Content: Thought content normal.         Judgment: Judgment normal.           ED Course, Procedures, & Data    10:45 AM  The patient was seen and examined by Dr. Edin Adams in Room AA.     Procedures       ED Course Selections:        EKG Interpretation:      Interpreted by Edin Adams MD  Time reviewed: 10:43 AM  Symptoms at time of EKG: Shortness of breath  Rhythm: paced  Rate: Normal  Axis: Right Axis Deviation  Ectopy: none  Conduction: normal  ST Segments/ T Waves: No ST-T wave changes  Q Waves: none  Comparison to prior: Unchanged from old EKG done on June 20, 2023    Clinical Impression: Paced rhythm with no acute ischemia                       No results found for any visits on 07/27/23.  Medications - No data to display  Labs Ordered and Resulted from Time of ED Arrival to Time of ED Departure - No data to display  No orders to display          Critical care was not performed.     Medical Decision Making  The patient's presentation was of high complexity (an acute health issue posing potential threat to life or bodily function).    The patient's evaluation involved:  review of external note(s) from 1 sources (see separate area of note for details)  ordering and/or review of 3+ test(s) in this encounter (see separate area of note for details)  independent interpretation of testing performed by another health professional (see separate area of note for details)  discussion of management or test interpretation with another health professional (see separate area of note for details)    The patient's management necessitated high risk (a decision regarding hospitalization).    Assessment & Plan    68-year-old  LVAD patient presented emergency department with weight gain, difficulty breathing.  Frontal diagnosis: Fluid overload, pneumonia, pneumothorax, ACS, unlikely LVAD malfunction.    After thorough history and physical exam patient appears to be no acute distress.  I will diurese him with IV Lasix given clinical signs of fluid overload.  We will obtain laboratory studies, EKG, chest x-ray and consult heart failure service for likely admission.  Patient and his wife agree with the plan.    Laboratory studies returned with mild leukocytosis of 11,700.  There is chronic anemia, however, hemoglobin slightly lower at 8.1.  There is evidence of acute kidney injury with elevated creatinine of 3.38.  There is also hyponatremia with sodium of 126.  Troponin is elevated, however, this appears to be chronic and stable.  INR is therapeutic at 2.62.  I reviewed patient's chest x-ray and I read the radiology report; no evidence for pneumonia, pneumothorax, or fluid overload.  Patient was discussed with heart failure staff on-call and he will be admitted to their service for further evaluation and management.      This part of the medical record was transcribed by Lauren Puri, Medical Scribe, from a dictation done by Edin Adams MD.      I have reviewed the nursing notes. I have reviewed the findings, diagnosis, plan and need for follow up with the patient.    New Prescriptions    No medications on file       Final diagnoses:   None     Daniel ROCHA, am serving as a trained medical scribe to document services personally performed by Edin Adams MD, based on the provider's statements to me.      Edin ROCHA MD, was physically present and have reviewed and verified the accuracy of this note documented by Daniel Alves.     Edin Adams MD  [facility] EMERGENCY DEPARTMENT  7/27/2023     Edin Adams MD  07/27/23 [id number]

## 2023-07-27 NOTE — PROGRESS NOTES
"Provider update:    \"Pt's last map was 56, wondering if it's okay to hold hydralazine? Also, red line on PICC cannot flush anymore, only draw blood.\"    Paged Cosme    "

## 2023-07-27 NOTE — H&P
[facility]   Cardiology II Service / Advanced Heart Failure  History & Physical    Eliseo Tanner  : 1955  MRN # [id number]    ADMIT DATE: 2023    PCP: Jay Steele    CHIEF COMPLAINT: dyspnea and weight gain    HPI: Eliseo Tanner is a 68 year old male with NICM s/p HM3 and ICD (20), stage IV cirrhosis, chronic MSSA driveline infection (2020), Pseudomonas driveline infection who presented to the ED for evaluation of shortness of breath. He also notes 20 lb weight gain since last hospital discharge for heart failure on 23. Eliseo has frequent admissions for RV failure and most recently, was discharged home on IV dobutamine to help with diuresis and symptoms. He reports mostly retaining fluid in his abdomen but notes his lower extremities are slightly more swollen.  He also endorses shortness of breath and weakness. Patient denies LVAD alarms. He has no change to drive line drainage. He denies recent fevers, chills, cough, nausea, vomiting, pain.  Patient has been taking his medications as prescribed, no issues with dobutamine pump at home.     In the ED:   - troponin 174 (at baseline since 2023)  - INR 2.6  - WBC 11.7 from 10.1 on   - Hgb 8.1 (near baseline)   - Na 126 from 135  - K 4.6  - Cr 3.4 from baseline ~2.3   - CXR showed clear lungs    ASSESSMENT & PLAN:  Eliseo Tanner is a 68 year old male with HFrEF 2/2 NICM s/p HM3 LVAD in  now with late, severe RV failure and stage IV cirrhosis, chronic MSSA driveline infection on chronic IV abx who presented to [facility] ED with c/o shortness of breath, fatigue, and weight gain c/w fluid overload due to end stage RV failure.     # Acute on chronic biventricular systolic heart failure secondary to NICM (LVEF 15-20% per TTE 2022)  # s/p HM3 LVAD (20, DT) c/b late RV failure  # Troponinemia, demand ischemia due to HF  Stage D, NYHA Class IV. Multiple hospitalizations for decompensated RV failure. RHC 23 RA 15,  mPA 26, PCW 16, CI 2.57 at LVAD speed 5800rpm.VAD speed increased to 5900 rpm. He was started on IV dobutamine 2.5 mcg/kg/min and discharged home on this. Palliative care was consulted, plan is to continue aggressive medical management.     Patient presented with ~20 lb weight gain (199 lb, EDW ~180 lb), elevated NT pro BNP, BERNARDA on CKD, hyponatremia all c/w hypervolemia due to severe BiV heart failure. Trop mildly elevated, per baseline, due to demand ischemia with HF.      - Fluid status: +++hypervolemic, Bumex IV 4 mg then gtt 2mg/hr, continue hydrochlorothiazide 75 mg daily (pta dose)  - RV support/inotropy:  dobutamine @ 2.5mcg/kg/min via PICC, ordered VBG   - ACEi/ARB/ARNi:  deferred due to renal dysfunction  - Afterload reduction:  hydralazine 25mg TID   - BB:  contraindicated due to RV dysfunction  - Aldosterone antagonist:  contraindicated due to renal dysfunction  - SGLT2i:  Jardiance d/c'ed due to renal dysfunction  - SCD ppx:  ICD  - Antiplatelet: not on PTA, due to past epistaxis  - Anticoagulation: warfarin goal INR 1.7-2.3 (due to epistaxis). INR today 2.6 likely due to hepatic congestion, pharmacy to dose  - MAPs:  80s  - LDH:  baseline 300s -> 435 today, repeat tomorrow  - MEMS: will interrogate MEMS when he is on 6C  - GOC: prior conversations patient and wife consistent with restorative/aggressive GOC< continue to discuss     # Chronic LVAD drive line infection, MSSA and PSA  Follows with LVAD ID. On outpatient IV cefepime. Infection appears to be well-controlled currently.WBC 11/7 on admissions but per wife, site looks better than it has in awhile.  -continue VI cefepime q24hr, renally dose 1 g    # Leukocytosis, mild  WBC 11.7.  No signs of infection, afebrile. recurrent issue during prio radmissions.   - daily CBC for now  - low threshold to culture and broaded abx     # BERNARDA on CKD, cardiorenal  Cr 3.238 on admission, baseline is mid 2s. BERNARDA econdary to CRS/hypervolemia.    - q12hr BMP, should  "improve with diuresis     # NSVT  - continue pta amiodarone 200mg daily  - aim for K>4 Mg>2.      # Hypervolemic hyponatremia, chronic  Na 126 on admission, hypervolemic hyponatremia in setting of HF.   - q21hr BMP     # Liver cirrhosis with portal hypertension  Liver biopsy during recent admission revealed cirrhosis - likely multifactorial (remote EtOH misuse, NAFLD, chronic congestive hepatopathy from RV failure).  No evidence of decompensation. CT A/P (5/20) showed stable small volume ascites and mild mesenteric/retroperitoneal edema/cholelithiasis. Hepatology consulted 5/30, no concern for HE though at risk for.   - goal 2 BM daily  - daily LFTs    # BEATRIZ  Worsening anxiety lately per Chapis, wife. Previously on paxil but was weaned off in May and started on seroquel. Wife requesting to resume.  - continue seroquel 12.5 mg at HS   - psych consult in AM for addtl recs     OTHER/CHRONIC CONDITIONS:   Hypothyroidism:  6/27 TSH 9 free T4 1.3, PTA levothyroxine 100mcg qAM.   GERD:  PTA pantoprazole   Normocytic anemia/BELA:  Baseline 8-9, stable. PTA ferrous sulfate 325mg daily.   BPH:  PTA finasteride 5mg daily and tamsulosin 0.8mg qPM.  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  ABHINAV:  pta CPAP         Clinically Significant Risk Factors Present on Admission         # Hyponatremia: Lowest Na = 126 mmol/L in last 2 days, will monitor as appropriate       # Drug Induced Coagulation Defect: home medication list includes an anticoagulant medication    # Hypertension: Noted on problem list  # End stage heart failure: home medication list includes inotropes     # Obesity: Estimated body mass index is 31.17 kg/m  as calculated from the following:    Height as of this encounter: 1.702 m (5' 7\").    Weight as of this encounter: 90.3 kg (199 lb).           Cardiomyopathy  Systolic  Systolic acute      Fluid " overload, unspecified and Other disorders of electrolyte and fluid balance, not elsewhere classified    Chronic Liver Disease: Other cirrhosis of liver    CKD POA List: Stage 4 (GFR 15-29)    Chronic Fatigue and Other Debilities: Age-related physical debility  Limitation of activities due to disability        FEN: 2g Na 2L FR  PROPHY:  warfarin, PPI  LINES:  right PICC  DISPO:  5-7 days of diuresis  CODE STATUS:  full code      Siena Aguiar DNP, NP-C  Advanced Heart Failure/Cardiology II Service  Pager [phone]      60 minutes spent on the date of the encounter doing chart review, history and exam, documentation and further activities per the note    ROS:   See HPI    PMH:  Past Medical History:   Diagnosis Date    Chronic systolic congestive heart failure (H)     History of implantable cardioverter-defibrillator (ICD) placement     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)     MSSA    Legionella pneumonia (H)     LVAD (left ventricular assist device) present (H)     MSSA bacteremia 11/2020       PSH:  Past Surgical History:   Procedure Laterality Date    ANESTHESIA CARDIOVERSION N/A 02/28/2020    Procedure: ANESTHESIA, FOR CARDIOVERSION;  Surgeon: GENERIC ANESTHESIA PROVIDER;  Location: [facility] OR    COLONOSCOPY N/A 05/22/2023    Procedure: COLONOSCOPY, WITH POLYPECTOMY AND BIOPSY;  Surgeon: Myles Mota DO;  Location: [facility] GI    CV CARDIOMEMS WITH RIGHT HEART CATH N/A 09/20/2022    Procedure: Pulmonary Arterial Pressure Sensor Placement CPT Codes to be cleared by financial securing for this implant. 01339 and ;  Surgeon: Dalton Baeza MD;  [facility]    CV CENTRAL VENOUS CATHETER PLACEMENT N/A 02/13/2020    Procedure: Central Venous Catheter Placement;  Surgeon: Chente Moss MD;  Location:  HEART CARDIAC CATH LAB    CV INTRA AORTIC BALLOON N/A 02/07/2020    Procedure: Intra-Aortic Balloon Pump Insertion;  Surgeon: Denny  Jose OLIVA MD;  Location:  HEART CARDIAC CATH LAB    CV INTRA AORTIC BALLOON N/A 02/13/2020    Procedure: Intra-Aortic Balloon Pump Insertion;  Surgeon: Chente Moss MD;  Location:  HEART CARDIAC CATH LAB    CV RIGHT HEART CATH MEASUREMENTS RECORDED N/A 09/21/2020    Procedure: CV RIGHT HEART CATH;  Surgeon: Dalton Baeza MD;  Location:  HEART CARDIAC CATH LAB    CV RIGHT HEART CATH MEASUREMENTS RECORDED N/A 01/07/2021    Procedure: Right Heart Cath;  Surgeon: Chun Ball MD;  Location:  HEART CARDIAC CATH LAB    CV RIGHT HEART CATH MEASUREMENTS RECORDED N/A 02/10/2022    Procedure: CV RIGHT HEART CATH;  Surgeon: Dalton Baeza MD;  Location:  HEART CARDIAC CATH LAB    CV RIGHT HEART CATH MEASUREMENTS RECORDED N/A 09/20/2022    Procedure: Right Heart Catheterization [[id number]];  Surgeon: Dalton Baeza MD;  Location:  HEART CARDIAC CATH LAB    CV RIGHT HEART CATH MEASUREMENTS RECORDED N/A 12/12/2022    Procedure: Right Heart Cath;  Surgeon: Chente Moss MD;  Location:  HEART CARDIAC CATH LAB    CV RIGHT HEART CATH MEASUREMENTS RECORDED N/A 04/28/2023    Procedure: Right Heart Catheterization;  Surgeon: Aaliyah Fischer MD;  Location:  HEART CARDIAC CATH LAB    CV RIGHT HEART CATH MEASUREMENTS RECORDED N/A 06/21/2023    Procedure: Right Heart Catheterization;  Surgeon: Nader Cisneros MD;  Location:  HEART CARDIAC CATH LAB    CV SWAN LUCIANA PROCEDURE N/A 02/13/2020    Procedure: Bruce Luciana Procedure;  Surgeon: Chente Moss MD;  Location:  HEART CARDIAC CATH LAB    EP ICD Bilateral 03/16/2020    Procedure: EP ICD;  Surgeon: Dali Day MD;  Location:  HEART CARDIAC CATH LAB    ESOPHAGOSCOPY, GASTROSCOPY, DUODENOSCOPY (EGD), COMBINED N/A 05/22/2023    Procedure: ESOPHAGOGASTRODUODENOSCOPY, WITH BIOPSY;  Surgeon: Myles Mota DO;  Location:  GI    INCISION AND DRAINAGE CHEST WASHOUT, COMBINED N/A 12/11/2022     Procedure: INCISION AND DRAINAGE OF DRIVELINE;  Surgeon: Mac Jaramillo MD;  Location: [facility] OR    INSERT VENTRICULAR ASSIST DEVICE LEFT (HEARTMATE II) N/A 02/18/2020    Procedure: INSERTION, LEFT VENTRICULAR ASSIST DEVICE (HEARTMATE III);  Surgeon: Mac Jaramillo MD;  Location: [facility]    IR CVC TUNNEL PLACEMENT > 5 YRS OF AGE  02/23/2021    IR CVC TUNNEL REMOVAL RIGHT  03/16/2021    IR TRANSCATHETER BIOPSY  05/02/2023    MIDLINE INSERTION - DOUBLE LUMEN Left 12/15/2022    left basilic 5 fr dl midline 20 cm    PICC DOUBLE LUMEN PLACEMENT Right 06/22/2023    PICC rewired right basilic vein 42cm total 1cm external.    THORACOSCOPY Right 03/06/2020    Procedure: RIGHT VIDEO-ASSISTED THORASCOPIC SURGERY, EVACUATION OF HEMOTHORAX, PLACEMENT OF CHEST TUBES;  Surgeon: William Gan MD;  Location: [facility] OR       MEDICATIONS:  Prior to Admission Medications   Prescriptions Last Dose Informant Patient Reported? Taking?   DOBUTamine 500 mg in dextrose 5% 250 mL (adult std conc) premix   No No   Sig: Inject 212 mcg/min into the vein continuous   QUEtiapine (SEROQUEL) 25 MG tablet  Self No No   Sig: Take 0.5 tablets (12.5 mg) by mouth At Bedtime   allopurinol (ZYLOPRIM) 100 MG tablet  Self No No   Sig: Take 2 tablets (200 mg) by mouth daily   amiodarone (PACERONE) 200 MG tablet  Self No No   Sig: TAKE 1 TABLET BY MOUTH ONCE DAILY   ceFEPIme (MAXIPIME) 2 g vial   Yes No   Sig: Inject 2 g into the vein every 24 hours   co-enzyme Q-10 200 MG CAPS  Self Yes No   Sig: Take 200 mg by mouth daily   ferrous sulfate (FEROSUL) 325 (65 Fe) MG tablet  Self No No   Sig: Take 1 tablet (325 mg) by mouth daily (with breakfast)   finasteride (PROSCAR) 5 MG tablet  Self No No   Sig: Take 1 tablet (5 mg) by mouth daily Helps with urinary retention.   hydrALAZINE (APRESOLINE) 25 MG tablet   No No   Sig: Take 1 tablet (25 mg) by mouth every 8 hours   hydrochlorothiazide (HYDRODIURIL) 25 MG tablet   No No   Sig: Take 3 tablets (75 mg) by  mouth daily   levothyroxine (SYNTHROID/LEVOTHROID) 100 MCG tablet  Self No No   Sig: Take 1 tablet (100 mcg) by mouth every morning (before breakfast)   loperamide (IMODIUM) 2 MG capsule   No No   Sig: Take 1 capsule (2 mg) by mouth 4 times daily as needed for diarrhea   magnesium oxide (MAG-OX) 400 MG tablet  Self No No   Sig: Take 1 tablet (400 mg) by mouth 3 times daily   metolazone (ZAROXOLYN) 2.5 MG tablet   No No   Sig: Take 1 tablet (2.5 mg) by mouth daily as needed (for weight gain as directed by the VAD team)   pantoprazole (PROTONIX) 40 MG EC tablet   Yes No   Sig: Take 1 tablet (40 mg) by mouth 2 times daily (before meals)   potassium chloride ER (KLOR-CON M) 20 MEQ CR tablet   No No   Sig: Take 100mEq in the morning, Take 100 mEq in the afternoon, Take 100 mEq in the PM   tamsulosin (FLOMAX) 0.4 MG capsule  Self Yes No   Sig: Take 0.8 mg by mouth every evening This med helps with urinary retention   torsemide (DEMADEX) 100 MG tablet  Self No No   Sig: Take 1 tablet (100 mg) by mouth 3 times daily   warfarin ANTICOAGULANT (COUMADIN) 4 MG tablet   Yes No   Sig: Take 2 mg by mouth on MWF, take 4 mg by mouth TThSatSun      Facility-Administered Medications: None        ALLERGIES:     Allergies   Allergen Reactions    Heparin      HIT screen positive 20, reflex DAVINA negative; however heme recommended treating as if positive  HIT screen negative 20    Chlorhexidine Rash    Oxycodone Other (See Comments) and Itching       FAMILY HISTORY:  No family history on file.    SOCIAL HISTORY:  Social History     Socioeconomic History    Marital status:      Spouse name: Not on file    Number of children: Not on file    Years of education: Not on file    Highest education level: Not on file   Occupational History    Not on file   Tobacco Use    Smoking status: Former     Types: Cigarettes     Quit date: 1994     Years since quittin.3    Smokeless tobacco: Never   Vaping Use    Vaping Use: Never  "used   Substance and Sexual Activity    Alcohol use: Not Currently    Drug use: Never    Sexual activity: Not on file   Other Topics Concern    Not on file   Social History Narrative    Not on file     Social Determinants of Health     Financial Resource Strain: Not on file   Food Insecurity: Not on file   Transportation Needs: Not on file   Physical Activity: Not on file   Stress: Not on file   Social Connections: Not on file   Intimate Partner Violence: Not on file   Housing Stability: Not on file       PHYSICAL EXAM:  Pulse 86, temperature 97.2  F (36.2  C), temperature source Oral, resp. rate 18, height 1.702 m (5' 7\"), weight 90.3 kg (199 lb), SpO2 93 %.    GENERAL: Appears comfortable, in no acute distress.   HEENT: Eye symmetrical, no discharge or icterus bilaterally. Mucous membranes moist and without lesions.  NECK: Supple, JVD at tragus.  CV: +VAD hum.   RESPIRATORY: Respirations regular, even, and unlabored. Lungs dim throughout.   GI: Firm and severely distended with normoactive bowel sounds present in all quadrants. No tenderness, rebound, guarding. No organomegaly.   EXTREMITIES: 2-3+ peripheral edema. 1+ bilateral pedal pulses.   NEUROLOGIC: Alert and orientated x 3. No focal deficits.   MUSCULOSKELETAL: No joint swelling or tenderness.   SKIN: No jaundice. No rashes or lesions.     LABS:  CBC:  Recent Labs   Lab Test 07/27/23  1031   WBC 11.7*   RBC 2.92*   HGB 8.1*   HCT 27.2*   MCV 93   MCH 27.7   MCHC 29.8*   RDW 19.4*          CMP:  Recent Labs   Lab Test 07/27/23  1031 06/20/23  1942 06/20/23  1550   *   < > 133*   POTASSIUM 4.6   < > 3.6   CHLORIDE 94*   < > 99   CALOS 8.3*   < > 8.2*   CO2 16*   < > 14*   .0*   < > 101.0*   CR 3.38*   < > 3.24*   *   < > 142*   AST  --   --  46*   ALT  --   --  44   BILITOTAL  --   --  0.4   ALBUMIN  --   --  3.3*   PROTTOTAL  --   --  7.0   ALKPHOS  --   --  135*    < > = values in this interval not displayed. "       IMAGING:  CXR  Impression: Clear lungs. Support devices in place.     I have reviewed today's vital signs, notes, medications, labs and imaging.  I have also seen and examined the patient and agree with the findings and plan as outlined above.   Pt is 69 yo male who is S/P HM3 in 2020, hx of cirrhosis, CKD, chronic MSSA driveline infection on cefepime and severe RV failure presents with 20# wt gain, BERNARDA (cardiorenal syndrome) in setting of chronic driveline infection.  Plan is to begin diuretic therapy, continue cefepime and follow renal fn and potassium levels closely.     Daniel Gomes MD, PhD  Professor, Heart Failure and Cardiac Transplantation  [facility]

## 2023-07-28 NOTE — PROGRESS NOTES
Care Coordinator  D:Eliseo Tanner is a 68 year old male with HFrEF 2/2 NICM s/p HM3 LVAD in 2020 now with late, severe RV failure and stage IV cirrhosis, chronic MSSA driveline infection on chronic IV abx who presented to [facility] ED with c/o shortness of breath, fatigue, and weight gain c/w fluid overload due to end stage RV failure. He was hypotensive in ED so started on dopamine in addition to dobutamine.   I: Pt is known to this writer. He  lives at home with his wife in [place], [place] and is close to the hospital there.  [facility] (IV dobutamine)--started 6/26/23 with PICC  Phone: [phone]     [facility]   Phone: [phone]   Fax: [phone]   Direct line (to be used on the weekend if needed): [phone]   resume outpatient IV cefepime infusions starting Friday 6/30.   They do PICC dressing change every week and draw labs per MD orders.    LVAD: is independent and has supplies per  note. Warfarin managed by [facility] anticoagulation clinic.  A: c/o shortness of breath, fatigue, and weight gain c/w fluid overload due to end stage RV failure.   P: OT says home with assist/TCU. Will follow for needs.

## 2023-07-28 NOTE — PROGRESS NOTES
"Goal outcome evaluation:  Time: 2300 - 0700  Plan of care reviewed with: Patient  Overall patient progress: No change  VS Pulse 107   Temp 97.6  F (36.4  C) (Oral)   Resp 22   Ht 1.702 m (5' 7\")   Wt 90.3 kg (199 lb)   SpO2 95%   BMI 31.17 kg/m          Status: HFrEF 2/2 NICM s/p HM3 LVAD in 2020 now with late, severe RV failure and stage IV cirrhosis, chronic MSSA driveline infection on chronic IV abx who presented to [facility] ED with c/o shortness of breath, fatigue, and weight gain c/w fluid overload due to end stage RV failure   Activity: Assist x 1  Neuros: Alert and oriented to person, place, situation and time. Calls appropriately. Able to make needs known. Clear and appropriate speech. Follows instructions.  Psychosocial Assessment: Cooperative and interacts appropriately with staff.   Cardiac: HM3, zero alarms, all numbers within ordered parameters. tachycardic. BB w/PVC's. Doppler MAP 65-68.  Respiratory: Tachypnea, RR 20-24 . LS diminished @ bases. On 3L via NC.  GI/: Abdomen distended. Reports no nausea, vomiting. Bowel sounds are present in all quadrants, last BM 7/26/23 per pt. Reports no difficulty or pain with urination.   Diet: 2 gram sodium diet with 2L fluid restriction  Skin/Incisions: Scattered bruising and scabs.   Lines/Drains: Drive line, LPIV SL, R DL PICC infusing bumex, dobutamine, dopamine  Labs: Reviewed - Na 128, BG ACHS, Cr. 3.46, Lactic 0.9  Pain/Nausea: C/O Left hip muscle soreness  New changes this shift:Transferred from ED.  Plan: Diurese 5-7 days. Continue POC, notify provider of changes    "

## 2023-07-28 NOTE — CONSULTS
Care Management Initial Consult    General Information  Assessment completed with: Patient,    Type of CM/SW Visit: Progression of Care    Primary Care Provider verified and updated as needed: Yes   Readmission within the last 30 days:   No        Advance Care Planning: Advance Care Planning Reviewed: no concerns identified          Communication Assessment  Patient's communication style: spoken language (English or Bilingual)    Hearing Difficulty or Deaf: yes   Wear Glasses or Blind: yes    Cognitive  Cognitive/Neuro/Behavioral: WDL                      Living Environment:   People in home: spouse, grandchild(arnold)     Current living Arrangements: house      Able to return to prior arrangements: yes       Family/Social Support:  Care provided by: self, spouse  Provides care for: no one  Marital Status:   Wife-Chapis, Children      Description of Support System: Supportive, Involved    Support Assessment: Adequate family and caregiver support    Current Resources:   Patient receiving home care services: Yes  Skilled Home Care Services: Skilled Nursing  Community Resources: Home Infusion-[facility] at discharge from last hospitalization for IV Dobutamine, OP Infusion IV Cefapime daily-[facility] Phone: [phone]/Fax: [phone]   Equipment currently used at home: walker, rolling, other (see comments) (Scooter)  Supplies currently used at home: Wound Care Supplies    Employment/Financial:  Employment Status: retired        Financial Concerns:   None          Does the patient's insurance plan have a 3 day qualifying hospital stay waiver?  No    Lifestyle & Psychosocial Needs:  Social Determinants of Health     Tobacco Use: Medium Risk (7/27/2023)    Patient History     Smoking Tobacco Use: Former     Smokeless Tobacco Use: Never     Passive Exposure: Not on file   Alcohol Use: Not on file   Financial Resource Strain: Not on file   Food Insecurity: Not on file   Transportation  Needs: Not on file   Physical Activity: Not on file   Stress: Not on file   Social Connections: Not on file   Intimate Partner Violence: Not on file   Depression: Not at risk (7/14/2023)    PHQ-2     PHQ-2 Score: 0   Housing Stability: Not on file       Functional Status:  Prior to admission patient needed assistance:   Dependent ADLs:: Independent  Dependent IADLs:: Independent       Mental Health Status:  Mental Health Status: No Current Concerns       Chemical Dependency Status:  Chemical Dependency Status: No Current Concerns             Values/Beliefs:  Spiritual, Cultural Beliefs, Episcopal Practices, Values that affect care:                 Additional Information:  Pt known to SW from LVAD program. Pt had LVAD implanted 2/18/2020. This is pt's 6th admission, this year, for fluid management. After a few months ago, he went home with daily IV abx and IV diuretic at his local clinic. Pt continues with daily IV abx at his local clinic in [place]. Pt does not have coverage for home IV abx and this is why he has to do outpatient infusion. However, he was discharged home with IV inotropes at discharge in June and was referred to  home infusion at that time.    There have been multiple goals of care conversations had with pt, wife and adult children during previous admissions. Goal has been and continues to be aggressive treatment to get to his dtr's wedding in September.  He reports he was able to participate in his 68th birthday party recently.     Pt lives with his wife and granddaughter in [facility]. Pt has great support from his wife. Pt is independent w/ ADLs, IADLs, ambulation and driving to his outpatient infusion appts. Has had frequent falls at home. In the past. Pt reports his sons have built a ramp at home and he is using his walker and scooter.     Admitted for fluid removal and plans on discharging home when medically stable.    Will need resumption of home infusion for IV dobutamine and  outpatient infusion for daily IV abx.    Betty Kincaid, MaineGeneral Medical CenterSW

## 2023-07-28 NOTE — PHARMACY-ANTICOAGULATION SERVICE
Clinical Pharmacy - Warfarin Dosing Consult     Pharmacy has been consulted to manage this patient s warfarin therapy.  Indication: LVAD/RVAD  Therapy Goal: Other - see comments (1.7-2.3)  Provider/Team: Mi BAUTISTA Anticoag Clinic: [facility]  Warfarin Prior to Admission: Yes  Warfarin PTA Regimen: 2mg MWF; 4mg all other week  Significant drug interactions: amiodarone (increase INR)  Recent documented change in oral intake/nutrition: Unknown    INR   Date Value Ref Range Status   07/28/2023 2.37 (H) 0.85 - 1.15 Final   07/27/2023 2.42 (H) 0.85 - 1.15 Final     Factor 10 Chromogenic   Date Value Ref Range Status   04/23/2023 42 (L) 70 - 130 % Final       Recommend warfarin 2 mg today.  Pharmacy will monitor Eliseo Tanner daily and order warfarin doses to achieve specified goal.      Please contact pharmacy as soon as possible if the warfarin needs to be held for a procedure or if the warfarin goals change.      Lauren Lacey, PharmD  PGY-1 Pharmacy Resident

## 2023-07-28 NOTE — PROCEDURES
The patient's HeartMate LVAD was interrogated 7/28/2023  * Speed 5900 rpm   * Pulsatility index 3.8   * Power 4.8-5 Driver   * Flow 5.1 L/minute   Fluid status: hypevolemic   Alarms were reviewed, and notable for some PI events.   The driveline exit site was inspected, dressing cdi.   All external components were inspected and showed no evidence of damage or malfunction, none replaced.   No changes to VAD settings made

## 2023-07-28 NOTE — PROGRESS NOTES
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 He was hypotensive in ED so started on dopamine in addition to dobutamine.     Today's Plan:  -continue inotropes at current doses, if MAP stable by tomorrow will consider stopping dopamine and increasing dobutamine  -extra 20 meq kcl IV today, repeat at noon  -prn apap for leg pain  -increase seroquel to 25 mg daily  -12 lead ECG  -updates wife Chapis  -prn atarax for itching/anxiety    # Acute on chronic biventricular systolic heart failure secondary to NICM (LVEF 15-20% per TTE 9/2022)  # s/p HM3 LVAD (2/18/20, DT) c/b late RV failure  # Inotrope dependence/cardiogenic shock  # Troponinemia, demand ischemia due to HF  Stage D, NYHA Class IV. Multiple hospitalizations for decompensated RV failure. RHC 6/21/23 RA 15, mPA 26, PCW 16, CI 2.57 at LVAD speed 5800rpm.VAD speed increased to 5900 rpm. He was started on IV dobutamine 2.5 mcg/kg/min and discharged home on this. Palliative care was consulted, plan is to continue aggressive medical management.      Patient presented with ~20 lb weight gain (199 lb, EDW ~180 lb), elevated NT pro BNP, BERNARDA on CKD, hyponatremia all c/w hypervolemia due to severe BiV heart failure. Trop mildly elevated, per baseline, due to demand ischemia with HF. Developed hypotension in the ED MAP mid 50s so started on dopamine 2.5 mcg/kg/min.      - Fluid status: +++hypervolemic, Bumex gtt 2mg/hr, HOLD hydrochlorothiazide 75 mg daily for hypotension, will give IV diuril 500 mg this  AM with extra kcl   - RV support/inotropy: pta dobutamine 2.5mcg/kg/min and dopamine 2.5mcg/kg/min (started 7/27 for MAP 50s)  - ACEi/ARB/ARNi:  deferred due to renal dysfunction  - Afterload reduction:  HOLD hydralazine 25mg TID for hypotension  - BB:  contraindicated due to RV dysfunction  - Aldosterone antagonist: contraindicated due to renal dysfunction  - SGLT2i: Jardiance d/c'ed due to renal dysfunction  - SCD ppx:  ICD  - Antiplatelet: not on PTA, due to past epistaxis  - Anticoagulation: warfarin goal INR 1.7-2.3 (due to epistaxis). INR today 2.3  - MAPs:  80s  - LDH:  baseline 300s -> 435 on admission, 362 today, monitor  - MEMS: interrogate CardioMEMS in AM  - GOC: prior conversations patient and wife consistent with restorative/aggressive GOC, continue to discuss given progression of disease     # Chronic LVAD drive line infection, MSSA and PSA  Follows with LVAD ID. On outpatient IV cefepime. Infection appears to be well-controlled currently.WBC 11/7 on admissions but per wife, site looks better than it has in awhile.  -continue VI cefepime q24hr, renally dose 1 g     # Leukocytosis, mild  WBC 11.7.  No signs of infection, afebrile. recurrent issue during prio radmissions.   - daily CBC for now  - low threshold to culture and broaded abx     # BERNARDA on CKD, cardiorenal  Cr 3.238 on admission, baseline is mid 2s. BERNARDA econdary to CRS/hypervolemia. Anticipate will improve with diuresis,  - q12hr BMP  - diuresis as above  - dopamine and dobutamine as above     # NSVT  - continue pta amiodarone 200mg daily  - aim for K>4 Mg>2.      # Hypervolemic hyponatremia, chronic  Na 126 on admission, hypervolemic hyponatremia in setting of HF. Anticipate will improve with diuresis,  - q21hr BMP     # Liver cirrhosis with portal hypertension  Liver biopsy during recent admission revealed cirrhosis - likely multifactorial (remote EtOH misuse, NAFLD, chronic congestive hepatopathy from RV failure).  No evidence of  "decompensation. CT A/P (5/20) showed stable small volume ascites and mild mesenteric/retroperitoneal edema/cholelithiasis. Hepatology consulted 5/30, no concern for HE though at risk for. AST 64 and ALT wnl on admit.   - goal 2 BM daily  - intermittent  LFTs     # BEATRIZ  Worsening anxiety lately per Chapis, wife. Previously on paxil but was weaned off in May (when he had ecephalopthy) and started on seroquel. Wife requesting to resume.  - increase seroquel 25 mg at HS   - consider psych consult for recs, will d/w Chapis     OTHER/CHRONIC CONDITIONS:   Hypothyroidism:  7/27 TSH 7.5 free T4 1.4, PTA levothyroxine 100mcg qAM.   GERD:  PTA pantoprazole   Normocytic anemia/BELA:  Baseline 8-9, stable. PTA ferrous sulfate 325mg daily.   BPH:  PTA finasteride 5mg daily and tamsulosin 0.8mg qPM.  DMII:  A1c 6.3 , LDSSI, hypoglycemia protocol while inpatient, diet controlled as outpatient   Gout:  pta allopurinol 200mg daily  h/o HIT  ABHINAV:  pta CPAP      Siena Aguiar DNP, NP-C  Advanced Heart Failure/Cardiology II Service  Pager [phone] [facility] [phone]      Patient discussed with Dr. Cisneros.        40 minutes spent on the date of the encounter doing chart review, history and exam, documentation and further activities per the note    ================================================================    Subjective/24-Hr Events:   Last 24 hr care team notes reviewed. No overnight events. Still shortness of breath. No tele events. Only 1.4L UOP documented. MAPs have improved.     ROS:  4 point ROS including respiratory, CV, GI and  (other than that noted in the HPI) is negative.     Medications: Reviewed in EPIC.     Physical Exam:   Pulse 102   Temp 97.6  F (36.4  C) (Oral)   Resp 22   Ht 1.702 m (5' 7\")   Wt 90.8 kg (200 lb 1.6 oz)   SpO2 94%   BMI 31.34 kg/m      GENERAL: Appears comfortable, in no distress.  HEENT: Eye symmetrical, no discharge or icterus bilaterally. Mucous membranes moist and without lesions.  NECK: Supple, " JVD at tragus at 90 degrees.   CV: +VAD hum   RESPIRATORY: Respirations regular, even, and unlabored. Lungs clear/dim anteriorly  GI: Firm, obese and moderately distended with normoactive bowel sounds present in all quadrants. No tenderness, rebound, guarding.   EXTREMITIES: 2+ BLE peripheral edema to knees. Non pulsatile.   NEUROLOGIC: Alert and oriented x 3. No focal deficits. [place] per baseline.   MUSCULOSKELETAL: No joint swelling or tenderness.   SKIN: No jaundice. No rashes or lesions.     Labs:  CMP  Recent Labs   Lab 07/28/23  0904 07/28/23  0514 07/27/23  2216 07/27/23  2215 07/27/23  1636 07/27/23  1031   NA  --  134* 128*  --   --  126*   POTASSIUM  --  3.6 3.8  --   --  4.6   CHLORIDE  --  98 94*  --   --  94*   CO2  --  20* 17*  --   --  16*   ANIONGAP  --  16* 17*  --   --  16*   * 167* 191* 188*   < > 280*   BUN  --  106.0* 106.0*  --   --  105.0*   CR  --  3.49* 3.46*  --   --  3.38*   GFRESTIMATED  --  18* 18*  --   --  19*   CALOS  --  8.5* 8.5*  --   --  8.3*   MAG  --  2.1 2.1  --   --   --    PROTTOTAL  --   --   --   --   --  6.9   ALBUMIN  --   --   --   --   --  3.6   BILITOTAL  --   --   --   --   --  0.4   ALKPHOS  --   --   --   --   --  108   AST  --   --   --   --   --  64*   ALT  --   --   --   --   --  39    < > = values in this interval not displayed.       CBC  Recent Labs   Lab 07/27/23  1031   WBC 11.7*   RBC 2.92*   HGB 8.1*   HCT 27.2*   MCV 93   MCH 27.7   MCHC 29.8*   RDW 19.4*          INR  Recent Labs   Lab 07/28/23  0540 07/27/23  1435 07/27/23  1031   INR 2.37* 2.42* 2.62*

## 2023-07-28 NOTE — PROGRESS NOTES
"Pulse 100   Temp 97.6  F (36.4  C) (Oral)   Resp 16   Ht 1.702 m (5' 7\")   Wt 90.8 kg (200 lb 1.6 oz)   SpO2 95%   BMI 31.34 kg/m     MA-    [id number]  Neuro: A&Ox4.   Cardiac: Afebrile, AVSS.HM3, zero alarms, all numbers within ordered parameters. tachycardic. BB w/PVC's. Doppler MAP 82-84.On dobutamine gtts@5 mcg/kg/min. Dopamine d/cd.  Respiratory: On RA   GI/: Voiding spontaneously in bedside urinal. No BM this shift.   Diet/appetite: Tolerating regular diet. Denies nausea   Activity: Up with 1xA .  Pain: 6/10 in ; Declined PTd/t L knee pain. Lido gel with minimal relief.knee-dilaudid x1 given with minmal relief.                                                                                                                         Skin: Generalized bruising.  Lines:R PICC infusing.   Replacement:Gigi Mg and K tid.  Pt has not been OOB today.Declined. Will continue to monitor and follow plan of care.            "

## 2023-07-28 NOTE — PROGRESS NOTES
Admission         7/27/2023 10:30 AM   -----------------------------------------------------------  Diagnosis:  SOB, fatigue, 20# weight gain    Admitted from:  ED  Report given from:  Aneesh RN  Via: Bed  Accompanied by: ED RN  Family Aware of Admission: Yes  Belongings: LVAD bag, clothing, bag, cell phone and , hearing aides and , FWW, cane  Admission Profile: Complete  Teaching: Orientation to unit, call don't fall, use of call light, meal times, visiting hours,  when to call for the RN (angina/sob/dizzyness, etc.), and enforced importance of safety.  Access: Right PICC, Lt PIV  Telemetry: Placed on pt  Ht./Wt.: Complete    Two nurse head-to-toe skin assessment performed by Bettina ROTHMAN and Roberto LI.  Skin issues noted: scattered bruising and scabs, driveline, abrasion lt forearm.  See PCS for assessment and treatment of wounds and surgical sites.    Temp:  [97.2  F (36.2  C)-97.6  F (36.4  C)] 97.6  F (36.4  C)  Pulse:  [] 107  Resp:  [15-29] 22  SpO2:  [93 %-100 %] 95 %

## 2023-07-29 NOTE — PROCEDURES
The patient's HeartMate LVAD was interrogated 7/29/2023  * Speed 5900 rpm   * Pulsatility index 2-3.6   * Power 4.8 Driver   * Flow 5.1 L/minute   Fluid status: hypevolemic   Alarms were reviewed, and notable for some PI events.   The driveline exit site was inspected, dressing cdi.   All external components were inspected and showed no evidence of damage or malfunction, none replaced.   No changes to VAD settings made

## 2023-07-29 NOTE — PROGRESS NOTES
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 He was hypotensive in ED so started on dopamine in addition to dobutamine.     Today's Plan:  -continue dobutamine at 5  -IV diuril 500 mg BID again today  -updated wife Chapis  -prn atarax for itching/anxiety  -attempt to interrogate MEMS today    # Acute on chronic biventricular systolic heart failure secondary to NICM (LVEF 15-20% per TTE 9/2022)  # s/p HM3 LVAD (2/18/20, DT) c/b late RV failure  # Inotrope dependence/cardiogenic shock  # Troponinemia, demand ischemia due to HF  Stage D, NYHA Class IV. Multiple hospitalizations for decompensated RV failure. RHC 6/21/23 RA 15, mPA 26, PCW 16, CI 2.57 at LVAD speed 5800rpm.VAD speed increased to 5900 rpm. He was started on IV dobutamine 2.5 mcg/kg/min and discharged home on this. Palliative care was consulted, plan is to continue aggressive medical management.      Patient presented with ~20 lb weight gain (199 lb, EDW ~180 lb), elevated NT pro BNP, BERNARDA on CKD, hyponatremia all c/w hypervolemia due to severe BiV heart failure. Trop mildly elevated, per baseline, due to demand ischemia with HF. Developed hypotension in the ED MAP mid 50s so started on dopamine 2.5 mcg/kg/min.      - Fluid status: hypervolemic, improved, Bumex gtt 2mg/hr, HOLD hydrochlorothiazide 75 mg daily for hypotension, will IV diuril 500 mg BID today goal net neg 2.5L  - RV support/inotropy: increased dobutamine 5mcg/kg/min 7/28 (briefly on dopamine for hypotension)  - ACEi/ARB/ARNi:   deferred due to renal dysfunction  - Afterload reduction:  HOLD hydralazine 25mg TID for hypotension  - BB:  contraindicated due to RV dysfunction  - Aldosterone antagonist: contraindicated due to renal dysfunction  - SGLT2i: Jardiance d/c'ed due to renal dysfunction  - SCD ppx:  ICD  - Antiplatelet: not on PTA, due to past epistaxis  - Anticoagulation: warfarin goal INR 1.7-2.3 (due to epistaxis). INR today 2.4  - MAPs:  80s  - LDH:  baseline 300s -> 435 on admission, 362 yesterday, monitor  - MEMS: interrogate CardioMEMS   - GOC: prior conversations patient and wife consistent with restorative/aggressive GOC, continue to discuss given progression of disease     # Chronic LVAD drive line infection, MSSA and PSA  Follows with LVAD ID. On outpatient IV cefepime. Infection appears to be well-controlled currently.WBC 11/7 on admissions but per wife, site looks better than it has in awhile.  -continue VI cefepime q24hr, renally dose 1 g     # Leukocytosis, mild, resolved  WBC 11.7 on admission.  No signs of infection, afebrile. recurrent issue during prior admissions. WBC now normal.   - daily CBC for now  - low threshold to culture and broaded abx     # BERNARDA on CKD, cardiorenal  Cr 3.4 on admission, baseline is mid 2s. BERNARDA econdary to CRS/hypervolemia. Anticipate will improve with diuresis,  - q12hr BMP  - diuresis as above  - dobutamine as above  - Cr improved to 2.3 today     # NSVT  - continue pta amiodarone 200mg daily  - aim for K>4 Mg>2.      # Hypervolemic hyponatremia, chronic, improved  Na 126 on admission, hypervolemic hyponatremia in setting of HF. Anticipate will improve with diuresis,  - q21hr BMP     # Liver cirrhosis with portal hypertension  Liver biopsy during recent admission revealed cirrhosis - likely multifactorial (remote EtOH misuse, NAFLD, chronic congestive hepatopathy from RV failure).  No evidence of decompensation. CT A/P (5/20) showed stable small volume ascites and mild  "mesenteric/retroperitoneal edema/cholelithiasis. Hepatology consulted 5/30, no concern for HE though at risk for. AST 64 and ALT wnl on admit.   - goal 2 BM daily, add miraline  - intermittent  LFTs     # BEATRIZ  Worsening anxiety lately per Chapis, wife. Previously on paxil but was weaned off in May (when he had ecephalopthy) and started on seroquel. Wife requesting to resume.  - increased seroquel 25 mg at HS   - prn atrax for now  - consider psych consult for recs if not improved before discharge, patient agreeable to resume ssi if needed     OTHER/CHRONIC CONDITIONS:   Hypothyroidism:  7/27 TSH 7.5 free T4 1.4, PTA levothyroxine 100mcg qAM.   GERD:  PTA pantoprazole   Normocytic anemia/BELA:  Baseline 8-9, stable. PTA ferrous sulfate 325mg daily.   BPH:  PTA finasteride 5mg daily and tamsulosin 0.8mg qPM.  DMII:  A1c 6.3 , LDSSI, hypoglycemia protocol while inpatient, diet controlled as outpatient   Gout:  pta allopurinol 200mg daily  h/o HIT  ABHINAV:  pta CPAP      Siena Aguiar DNP, NP-C  Advanced Heart Failure/Cardiology II Service  Pager [phone] [facility] [phone]      Patient discussed with Dr. Cisneros.        40 minutes spent on the date of the encounter doing chart review, history and exam, documentation and further activities per the note    ================================================================    Subjective/24-Hr Events:   Last 24 hr care team notes reviewed. No overnight events. Lost 4 lb overnight. No VAD or tele alarms. LE edema improved. Feeling well enough to walk today.     ROS:  4 point ROS including respiratory, CV, GI and  (other than that noted in the HPI) is negative.     Medications: Reviewed in EPIC.     Physical Exam:   Pulse 103   Temp 97.8  F (36.6  C) (Oral)   Resp 15   Ht 1.702 m (5' 7\")   Wt 89.2 kg (196 lb 11.2 oz)   SpO2 95%   BMI 30.81 kg/m      GENERAL: Appears comfortable, in no distress.  HEENT: Eye symmetrical, no discharge or icterus bilaterally. Mucous membranes moist and " without lesions.  NECK: Supple, JVD at tragus at 90 degrees.   CV: +VAD hum   RESPIRATORY: Respirations regular, even, and unlabored. Lungs clear/dim anteriorly  GI: Firm, obese and moderately distended with normoactive bowel sounds present in all quadrants. No tenderness, rebound, guarding.   EXTREMITIES: 2+ BLE peripheral edema to knees R>L. Non pulsatile.   NEUROLOGIC: Alert and oriented x 3. No focal deficits. [place] per baseline.   MUSCULOSKELETAL: No joint swelling or tenderness.   SKIN: No jaundice. No rashes or lesions.     Labs:  CMP  Recent Labs   Lab 07/29/23  0609 07/28/23  2234 07/28/23  1844 07/28/23  1639 07/28/23  1327 07/28/23  1154 07/28/23  0904 07/28/23  0514 07/27/23  2216 07/27/23  1636 07/27/23  1031   *  --  131*  --   --   --   --  134* 128*  --  126*   POTASSIUM 4.2  --  4.6  --   --  4.1  --  3.6 3.8  --  4.6   CHLORIDE 98  --  97*  --   --   --   --  98 94*  --  94*   CO2 19*  --  16*  --   --   --   --  20* 17*  --  16*   ANIONGAP 15  --  18*  --   --   --   --  16* 17*  --  16*   * 160* 149* 177*   < >  --    < > 167* 191*   < > 280*   .0*  --  99.0*  --   --   --   --  106.0* 106.0*  --  105.0*   CR 2.30*  --  3.42*  --   --   --   --  3.49* 3.46*  --  3.38*   GFRESTIMATED 30*  --  19*  --   --   --   --  18* 18*  --  19*   CALOS 8.1*  --  8.4*  --   --   --   --  8.5* 8.5*  --  8.3*   MAG 2.1  --  2.2  --   --   --   --  2.1 2.1  --   --    PROTTOTAL  --   --   --   --   --   --   --   --   --   --  6.9   ALBUMIN  --   --   --   --   --   --   --   --   --   --  3.6   BILITOTAL  --   --   --   --   --   --   --   --   --   --  0.4   ALKPHOS  --   --   --   --   --   --   --   --   --   --  108   AST  --   --   --   --   --   --   --   --   --   --  64*   ALT  --   --   --   --   --   --   --   --   --   --  39    < > = values in this interval not displayed.         CBC  Recent Labs   Lab 07/29/23  0609 07/27/23  1031   WBC 10.5 11.7*   RBC 2.80* 2.92*   HGB 7.7* 8.1*    HCT 24.9* 27.2*   MCV 89 93   MCH 27.5 27.7   MCHC 30.9* 29.8*   RDW 18.9* 19.4*    237         INR  Recent Labs   Lab 07/29/23  0609 07/28/23  0540 07/27/23  1435 07/27/23  1031   INR 2.47* 2.37* 2.42* 2.62*

## 2023-07-29 NOTE — PLAN OF CARE
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 He was hypotensive in ED so started on dopamine in addition to dobutamine.     I: Monitored vitals and assessed pt status.     Running:  Dobutamine 5 mcg/kg/min                    Bumex 2 mg/hr  Tele:  Sinus tachycardia with frequent ectopy  O2:  Room during day.  CPAP at HS  Mobility:  A1  FWW    A: Neuro: A/O x4.  Call light appropriate.  Able to make needs known.  Respiratory:  On room air.  Dyspnea on exertion  Cardiac: VSS.  ST.  ICD.  VVIR .  Doppler MAP in upper 80s.  HM 3.  All numbers within ordered parameters.  No alarms overnight.  Received 4 mg coumadin.  INR 2.37 (7/28).  No s/s of bleeding  GI: Last BM 7/27.  No report of nausea or vomiting.  : Urinating adequate amounts of clear, yellow urine  Endo:  Blood sugars ACHS.  Last   Skin:  See PCS for assessment and treatment of wounds and surgical incisions.  LDA:  Right double lumen PICC.  20 G PIC LA.    Drips:  Dobutamine, Bumex  Antibiotics:  Cefepime IV daily  Electrolytes: No RN managed electrolyte protocols ordered.  Pain: Reporting 6/10 pain in left thigh  Isolation:  Standard Precautions  Tests/procedures: None performed overnight.  Diet: 2 g Na.  2000 mL fluid restriction  Sleep:  No sleep disturbances noted or reported.    P: Continue to monitor Pt status and report changes to Cards 2.  Plan to discharge home with dobutamine gtt when medically appropriate and continue with outpatient bumex infusion.

## 2023-07-29 NOTE — PLAN OF CARE
D: presented to [facility] ED with c/o shortness of breath, fatigue, and weight gain c/w fluid overload due to end stage RV failure. He was hypotensive in ED so started on dopamine in addition to dobutamine.     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 Encouraged activity.      Changed: diuril given x2. Many issues with monitoring system, prashant monitor tech called and came in the afternoon.  Multiple Bms without laxatives.   IV Access: PIV and PICC double lumen (providers not wanting nurses to get labs off of pic lab only draw..  Running:  Dobutamine at 5 and bumex at 2  PRN: tylenol x2  Tele: ST 100s  O2: RA  Mobility: Assist 1  Skin: See flowsheets (dressing for LVAD not done)     A: A&Ox4. VSS. Afebrile. Napping on/off. LVAD numbers WDL. Urinal voids. 1800  FR 2 g NA diet. Commode for Bms. Multiple soft Bms.      P: Continue to monitor pt status and report changes to treatment team. Team would like cardiomems done tomorrow AM

## 2023-07-30 NOTE — PROGRESS NOTES
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 He was hypotensive in ED so started on dopamine in addition to dobutamine.     Today's Plan:  -continue dobutamine at 5  -IV diuril 1000 mg BID  -updated wife Chapis, hoping patient can discharge on Thursday or Friday     # Acute on chronic biventricular systolic heart failure secondary to NICM (LVEF 15-20% per TTE 9/2022)  # s/p HM3 LVAD (2/18/20, DT) c/b late RV failure  # Inotrope dependence/cardiogenic shock  # Troponinemia, demand ischemia due to HF  Stage D, NYHA Class IV. Multiple hospitalizations for decompensated RV failure. RHC 6/21/23 RA 15, mPA 26, PCW 16, CI 2.57 at LVAD speed 5800rpm.VAD speed increased to 5900 rpm. He was started on IV dobutamine 2.5 mcg/kg/min and discharged home on this. Palliative care was consulted, plan is to continue aggressive medical management.      Patient presented with ~20 lb weight gain (199 lb, EDW ~180 lb), elevated NT pro BNP, BERNARDA on CKD, hyponatremia all c/w hypervolemia due to severe BiV heart failure. Trop mildly elevated, per baseline, due to demand ischemia with HF. Developed hypotension in the ED MAP mid 50s so started on dopamine 2.5 mcg/kg/min.      - Fluid status: hypervolemic, Bumex gtt 2mg/hr, HOLD hydrochlorothiazide 75 mg daily for hypotension, IV diuril 1000 mg, BID today goal net neg 3L  - RV support/inotropy: dobutamine 5mcg/kg/min increased 7/28 (briefly on dopamine for hypotension)  - ACEi/ARB/ARNi: deferred due to renal dysfunction  -  Afterload reduction: resumed hydralazine 25mg TID   - BB:  contraindicated due to RV dysfunction  - Aldosterone antagonist: contraindicated due to renal dysfunction  - SGLT2i: Jardiance d/c'ed due to renal dysfunction  - SCD ppx:  ICD  - Antiplatelet: not on PTA, due to past epistaxis  - Anticoagulation: warfarin goal INR 1.7-2.3 (due to epistaxis). INR today 2.4  - MAPs:  mostly 70s  - LDH:  baseline 300s -> 435 on admission, [id number]   - MEMS: CardioMEMS 26 7/30 (baseline ~22), check intermittently/prior to discharge   - GOC: prior conversations patient and wife consistent with restorative/aggressive GOC, continue to discuss given progression of disease     # Chronic LVAD drive line infection, MSSA and PSA  Follows with LVAD ID. On outpatient IV cefepime. Infection appears to be well-controlled currently.WBC 11/7 on admissions but per wife, site looks better than it has in awhile.  -continue pta IV cefepime q24hr, renally dose 1 g     # Leukocytosis, mild, resolved  WBC 11.7 on admission.  No signs of infection, afebrile. recurrent issue during prior admissions. WBC now normal.   - daily CBC   - low threshold to culture and broaded abx     # BERNARDA on CKD, cardiorenal  Cr 3.4 on admission, baseline is mid 2s. BERNARDA econdary to CRS/hypervolemia. Anticipate will improve with diuresis, Cr 2.3 on 7/29 but suspect erroneous as now back >3.   - q12hr BMP  - diuresis as above  - dobutamine as above     # NSVT  - continue pta amiodarone 200mg daily  - aim for K>4 Mg>2.      # Hypervolemic hyponatremia, chronic, improved  Na 126 on admission, hypervolemic hyponatremia in setting of HF. Anticipate will improve with diuresis,  - q21hr BMP     # Liver cirrhosis with portal hypertension  Liver biopsy during recent admission revealed cirrhosis - likely multifactorial (remote EtOH misuse, NAFLD, chronic congestive hepatopathy from RV failure).  No evidence of decompensation. CT A/P (5/20) showed stable small volume ascites and mild  "mesenteric/retroperitoneal edema/cholelithiasis. Hepatology consulted 5/30, no concern for HE though at risk for. AST 64 and ALT wnl on admit.   - goal 2 BM daily, scheduled miralax hold for loose stools  - intermittent  LFTs     # BEATRIZ  Worsening anxiety lately per Chapis, wife. Previously on paxil but was weaned off in May (when he had ecephalopthy) and started on seroquel. Wife requesting an alterative and patient agreeable.  - increased seroquel 25 mg at HS then decreased back to 12.5 mg per patient made him drowsy  - prn atrax  for itching, anxiety  - patient requesting to resume paxil, started 10 mg on 7/29, titrate as outpatient     OTHER/CHRONIC CONDITIONS:   Hypothyroidism:  7/27 TSH 7.5 free T4 1.4, PTA levothyroxine 100mcg qAM.   GERD:  PTA pantoprazole   Normocytic anemia/BELA:  Baseline 8-9, stable. PTA ferrous sulfate 325mg daily.   BPH:  PTA finasteride 5mg daily and tamsulosin 0.8mg qPM.  DMII:  A1c 6.3 , LDSSI, hypoglycemia protocol while inpatient, diet controlled as outpatient   Gout:  pta allopurinol 200mg daily  h/o HIT  ABHINAV:  pta CPAP      Siena Aguiar DNP, NP-C  Advanced Heart Failure/Cardiology II Service  Pager [phone] [facility] [phone]      Patient discussed with Dr. Cisneros.        40 minutes spent on the date of the encounter doing chart review, history and exam, documentation and further activities per the note    ================================================================    Subjective/24-Hr Events:   Last 24 hr care team notes reviewed. Good UOP, lost 1 lb overnight.MAP stable. No tele alarms., slept better last night.    ROS:  4 point ROS including respiratory, CV, GI and  (other than that noted in the HPI) is negative.     Medications: Reviewed in EPIC.     Physical Exam:   /88 (BP Location: Right arm)   Pulse 106   Temp 97.8  F (36.6  C) (Oral)   Resp 18   Ht 1.702 m (5' 7\")   Wt 88.1 kg (194 lb 3.2 oz)   SpO2 98%   BMI 30.42 kg/m      GENERAL: Appears comfortable, in no " distress.  HEENT: Eye symmetrical, no discharge or icterus bilaterally. Mucous membranes moist and without lesions.  NECK: Supple, JVD to angle of jaw at 90 degrees.   CV: +VAD hum   RESPIRATORY: Respirations regular, even, and unlabored. Lungs clear/dim anteriorly  GI: Firm but improved, obese and moderately distended with normoactive bowel sounds present in all quadrants. No tenderness, rebound, guarding.   EXTREMITIES: 1+ BLE peripheral edema to knees R>L. Non pulsatile.   NEUROLOGIC: Alert and oriented x 3. No focal deficits. [place] per baseline.   MUSCULOSKELETAL: No joint swelling or tenderness.   SKIN: No jaundice. No rashes or lesions.     Labs:  CMP  Recent Labs   Lab 07/30/23  0823 07/30/23  0634 07/29/23  2224 07/29/23  1854 07/29/23  0858 07/29/23  0609 07/28/23  2234 07/28/23  1844 07/27/23  1636 07/27/23  1031   NA  --  134*  --  134*  --  132*  --  131*   < > 126*   POTASSIUM  --  4.4  --  4.3  --  4.2  --  4.6   < > 4.6   CHLORIDE  --  101  --  100  --  98  --  97*   < > 94*   CO2  --  18*  --  18*  --  19*  --  16*   < > 16*   ANIONGAP  --  15  --  16*  --  15  --  18*   < > 16*   * 145* 157* 175*   < > 248*   < > 149*   < > 280*   BUN  --  93.5*  --  101.0*  --  103.0*  --  99.0*   < > 105.0*   CR  --  3.42*  --  3.57*  --  2.30*  --  3.42*   < > 3.38*   GFRESTIMATED  --  19*  --  18*  --  30*  --  19*   < > 19*   CALOS  --  8.5*  --  8.4*  --  8.1*  --  8.4*   < > 8.3*   MAG  --  2.0  --  2.1  --  2.1  --  2.2   < >  --    PROTTOTAL  --   --   --   --   --   --   --   --   --  6.9   ALBUMIN  --   --   --   --   --   --   --   --   --  3.6   BILITOTAL  --   --   --   --   --   --   --   --   --  0.4   ALKPHOS  --   --   --   --   --   --   --   --   --  108   AST  --   --   --   --   --   --   --   --   --  64*   ALT  --   --   --   --   --   --   --   --   --  39    < > = values in this interval not displayed.       CBC  Recent Labs   Lab 07/30/23  0634 07/29/23  0609 07/27/23  1031   WBC 10.5  10.5 11.7*   RBC 3.23* 2.80* 2.92*   HGB 8.7* 7.7* 8.1*   HCT 29.0* 24.9* 27.2*   MCV 90 89 93   MCH 26.9 27.5 27.7   MCHC 30.0* 30.9* 29.8*   RDW 19.2* 18.9* 19.4*    217 237       INR  Recent Labs   Lab 07/30/23  0634 07/29/23  0609 07/28/23  0540 07/27/23  1435   INR 2.41* 2.47* 2.37* 2.42*

## 2023-07-30 NOTE — PROCEDURES
The patient's HeartMate LVAD was interrogated 7/30/2023  * Speed 5900 rpm   * Pulsatility index 2-3.6   * Power 4.5 Driver   * Flow 5.1 L/minute   Fluid status: hypevolemic   Alarms were reviewed, and notable for some PI events.   The driveline exit site was inspected, dressing cdi.   All external components were inspected and showed no evidence of damage or malfunction, none replaced.   No changes to VAD settings made

## 2023-07-30 NOTE — PROGRESS NOTES
"CLINICAL NUTRITION SERVICES - ASSESSMENT NOTE     Nutrition Prescription    RECOMMENDATIONS FOR MDs/PROVIDERS TO ORDER:  None today    Malnutrition Status:    Patient does not meet two of the established criteria necessary for diagnosing malnutrition but is at risk for malnutrition    Recommendations already ordered by Registered Dietitian (RD):  Order Ensure Enlive daily. Additional at pt/RN request.    Future/Additional Recommendations:  Monitor nutrition-related findings and follow pt per protocol     REASON FOR ASSESSMENT  Eliseo Tanner is a/an 68 year old male assessed by the dietitian for Provider Order - supplement recs    NUTRITION HISTORY  Attempted visit with pt this AM. Pt was sleepy but wife at bedside. Wife reports pt has had poor nutritional intake for past week. She thinks starting Ensure daily will be good but only once daily.    CURRENT NUTRITION ORDERS  Diet: 2 g Sodium and 1800 mL Fluid Restriction  Intake/Tolerance: RN reports % of meals however meal sizes are on the smaller side.    LABS  Labs reviewed - includes   Na 134 (L)   BUN 94.5 (H), Cr 3.42 (H), GFR 19 (L)   -145    MEDICATIONS  Medications reviewed - ferrous sulfate, mag-ox, PPI, Kcl, bumex, dobutamine    ANTHROPOMETRICS  Height: 170.2 cm (5' 7\")  Admit wt: 90.3 kg (199 lb)   Most recent wt: 88.1 kg (194 lb 3.2 oz)  IBW: 67.3 kg   130%IBW   Obesity Grade I BMI 30-34.9    Weight History:  wt up over past month  Wt Readings from Last 15 Encounters:   07/30/23 88.1 kg (194 lb 3.2 oz)   07/14/23 84.8 kg (187 lb)   06/29/23 83.6 kg (184 lb 4.9 oz)   05/31/23 80 kg (176 lb 5.9 oz)   05/11/23 88.1 kg (194 lb 3.2 oz)   05/03/23 85.5 kg (188 lb 9.6 oz)   03/28/23 80.2 kg (176 lb 12.8 oz)   02/14/23 80.3 kg (177 lb 1.6 oz)   12/20/22 79.9 kg (176 lb 1.6 oz)   10/18/22 89 kg (196 lb 3.4 oz)   09/20/22 86.8 kg (191 lb 4.8 oz)     Dosing Weight: 67.3 kg (IBW)    ASSESSED NUTRITION NEEDS  Estimated Energy Needs: 8868-1724 kcals/day (30 - " 35 kcals/kg)  Justification: Increased needs  Estimated Protein Needs: 70-80 grams protein/day (1 - 1.2 grams of pro/kg)  Justification: Renal function vs Increased needs  Estimated Fluid Needs: Per provider pending fluid status    PHYSICAL FINDINGS/OTHER FINDINGS  GI: LBM 7/30, 0-3 measured BMs per day over past week, per I/O.  See malnutrition section below.    MALNUTRITION  % Intake: < 75% for > 7 days (moderate)  % Weight Loss: None noted  Subcutaneous Fat Loss: None observed (visual exam)  Muscle Loss: None observed (visual exam)  Fluid Accumulation/Edema: Does not meet criteria  Malnutrition Diagnosis: Patient does not meet two of the established criteria necessary for diagnosing malnutrition but is at risk for malnutrition    NUTRITION DIAGNOSIS  Inadequate oral intake related to reduced appetite as evidenced by report of pt's wife, eating % of smaller meals this admit.      INTERVENTIONS  Implementation  Medical food supplement therapy     Goals  Patient to consume % of nutritionally adequate meal trays TID, or the equivalent with supplements/snacks.     Monitoring/Evaluation  Progress toward goals will be monitored and evaluated per protocol.  Yahaira Callahan RD, LD  6C/7C(beds 7401 - 7409) RD pgr: [phone]  Weekend/Holiday RD pager: [phone]

## 2023-07-30 NOTE — PLAN OF CARE
Lymphedema Therapy: Orders received. Chart reviewed and discussed with care team, pt and wife.? Lymphedema Therapy not indicated due to pt diuresing well; noted trace to 1+ edema in BLEs despite being in prolonged dependent position. Pt and wife declining edema services; noting that edema usually sits in abdomen and benefits mostly from medical interventions. Have comperm stockings at home and understanding of how to use in event of increased BLE edema.?No skilled IP lymphedema needs indicated, please re-consult as needed. Will complete orders.

## 2023-07-30 NOTE — PLAN OF CARE
D: who presented to [facility] ED with c/o shortness of breath, fatigue, and weight gain c/w fluid overload due to end stage RV failure. He was hypotensive in ED so started on dopamine in addition to dobutamine.     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 Encouraged activity.      Changed: diuril given. Restarted hydralazine. Nutrition consulted supplements started. Decreased seroquel. Atarax started for sleep/itching/anxiety at night.  IV Access: PICC double lumen (ok to draw labs now off of open line) and PIV  Running:  Dobutamine at 5 and bumex at 2  PRN: NA  Tele: ST 100s  O2: RA  Mobility: Assist 1 walker  Skin: See flowsheets     A: A&Ox4. VSS. Afebrile. 2g sodium diet and 1800 mL FR. Commode for Bms. Denies numbness or tingling. SOB present. Abdominal fullness rt volume status. BM today.     P: Continue to monitor pt status and report changes to treatment team.

## 2023-07-30 NOTE — PLAN OF CARE
D: Patient presented to [facility] ED 7/27 with c/o shortness of breath, fatigue, and weight gain c/f fluid overload due to end stage RV failure. He was hypotensive in ED and was started on dopamine in addition to home dobutamine. Hx. 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  I/A: A&Ox4. VSSA on RA. MIRANDA. ST 100s. LVAD #s wnl, no alarms. Denies pain. C/o discomfort d/t bloated/full abdomen. Dobutamine infusing at 5 mcg/kg/min. Bumex infusing at 2 mg/hr. Abx infused per orders.  at HS. Adequate uop. 1-assist. Scheduled seroquel given at HS. Appeared to rest comfortably overnight.   P: Continue to monitor and notify Cards 2 with questions/concerns.

## 2023-07-31 NOTE — PLAN OF CARE
"/88 (BP Location: Right arm)   Pulse 104   Temp 97.8  F (36.6  C) (Oral)   Resp 16   Ht 1.702 m (5' 7\")   Wt 87.9 kg (193 lb 12.8 oz)   SpO2 97%   BMI 30.35 kg/m     Shift:  6953-5767     Status: Adm 7/27 w/ c/o shortness of breath, fatigue, and weight gain c/w fluid overload due to end stage RV failure. PMH HFrEF 2/2 NICM s/p HM3 LVAD in 2020 now with late, severe RV failure and stage IV cirrhosis, chronic MSSA driveline infection on chronic IV     Neuro: A&O X 4. [place]. Bilateral hearing aids. Wears glasses.  Respiratory: RA, No cough. LS diminished   Cardiac: Patient has weak distal pulses. SR with BBB. Doppler MAP'S mid-high 80's. LVAD HM3 WNL, no alarms.   GI/: Voiding independently. Commode in use. 3 moderate amount loose stool this morning. AM bowel meds held.   Diet: 2 Gram NA   Pain: Denies   Skin: LVAD driveline dressing changed and CDI   IV Access: R DL PICC line in place. L PIV site.   Labs: Reviewed, unit collect   Activity: Ax1 w/ walker and GB    New changes this shift: No changes   Plan: Continue diuresing and notify Cards 2 with any changes or concerns.                         "

## 2023-07-31 NOTE — PLAN OF CARE
Time of care 1900 - 0730    Neuro: AOx4.  Cardiac: SR with BBB. Doppler BPs in the 90s this shift. HM3 LVAD without alarms and numbers WNL.   Respiratory: Denies shortness of breath. Breathing comfortably on room air.   GI/: Voiding to bedside urinal. Loose BM x1. Scheduled miralax held.   Diet/appetite: 2g sodium diet. 1.8 L fluid restriction.   Activity: Up with assist of 1 and walker   Pain: Denies.  Skin: Multiple scabs/wounds and generalized bruising.   Lines: Double lumen PICC and PIV. Dobutamine infusing at 5 mcg/kg/min. Bumex infusing at 2 mg/hr.         Goal Outcome Evaluation: progressing. Weight trending down slightly this morning. Per pt PRN atarax at bedtime was effective.

## 2023-07-31 NOTE — PROCEDURES
The patient's HeartMate LVAD was interrogated 7/31/2023  * Speed 5900 rpm   * Pulsatility index 3.5   * Power 4.9 Driver   * Flow 5.2 L/minute   Fluid status: hypervolemic   Alarms were reviewed, and notable for some PI events.   The driveline exit site was inspected, dressing cdi.   All external components were inspected and showed no evidence of damage or malfunction, none replaced.   No changes to VAD settings made

## 2023-07-31 NOTE — PROGRESS NOTES
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 He was hypotensive in ED so started on dopamine in addition to dobutamine.     Today's Plan:  -continue dobutamine at 5  -IV diuril 1000 mg once or twice today  -updated wife Chapis    # Acute on chronic biventricular systolic heart failure secondary to NICM (LVEF 15-20% per TTE 9/2022)  # s/p HM3 LVAD (2/18/20, DT) c/b late RV failure  # Inotrope dependence/cardiogenic shock  # Troponinemia, demand ischemia due to HF  Stage D, NYHA Class IV. Multiple hospitalizations for decompensated RV failure. RHC 6/21/23 RA 15, mPA 26, PCW 16, CI 2.57 at LVAD speed 5800rpm.VAD speed increased to 5900 rpm. He was started on IV dobutamine 2.5 mcg/kg/min and discharged home on this. Palliative care was consulted, plan is to continue aggressive medical management.      Patient presented with ~20 lb weight gain (199 lb, EDW ~180 lb), elevated NT pro BNP, BERNARDA on CKD, hyponatremia all c/w hypervolemia due to severe BiV heart failure. Trop mildly elevated, per baseline, due to demand ischemia with HF. Developed hypotension in the ED MAP mid 50s so started on dopamine 2.5 mcg/kg/min.      - Fluid status: hypervolemic, Bumex gtt 2mg/hr, HOLD hydrochlorothiazide 75 mg daily for hypotension, IV diuril 1000 mg 1-2 times today goal net neg 3L  - RV support/inotropy: dobutamine 5mcg/kg/min increased 7/28 (briefly on dopamine for hypotension)  - ACEi/ARB/ARNi: deferred due to renal dysfunction  - Afterload reduction: resumed  hydralazine 25mg TID   - BB:  contraindicated due to RV dysfunction  - Aldosterone antagonist: contraindicated due to renal dysfunction  - SGLT2i: Jardiance d/c'ed due to renal dysfunction  - SCD ppx:  ICD  - Antiplatelet: not on PTA, due to past epistaxis  - Anticoagulation: warfarin goal INR 1.7-2.3 (due to epistaxis). INR today 2.4  - MAPs:  70s-80s  - LDH:  baseline 300s -> 435 on admission, 362 most recently  - MEMS: CardioMEMS 26 7/30 (baseline ~22), check intermittently  - GOC: prior conversations patient and wife consistent with restorative/aggressive GOC, continue to discuss given progression of disease     # Chronic LVAD drive line infection, MSSA and PSA  Follows with LVAD ID. On outpatient IV cefepime. Infection appears to be well-controlled currently.WBC 11/7 on admissions but per wife, site looks better than it has in awhile.  -continue pta IV cefepime q24hr, renally dose 1 g     # Leukocytosis, mild, resolved  WBC 11.7 on admission.  No signs of infection, afebrile. recurrent issue during prior admissions. WBC now normal.   - daily CBC for now  - low threshold to culture and broaded abx     # BERNARDA on CKD, cardiorenal  Cr 3.4 on admission, baseline is mid 2s. BERNARDA econdary to CRS/hypervolemia. Anticipate will improve with diuresis, Cr 2.3 on 7/29 but suspect erroneous as now back >3.   - q12hr BMP  - diuresis as above  - dobutamine as above     # NSVT  - continue pta amiodarone 200mg daily  - aim for K>4 Mg>2.      # Hypervolemic hyponatremia, chronic, improved  Na 126 on admission, hypervolemic hyponatremia in setting of HF. Anticipate will improve with diuresis,  - q21hr BMP     # Liver cirrhosis with portal hypertension  Liver biopsy during recent admission revealed cirrhosis - likely multifactorial (remote EtOH misuse, NAFLD, chronic congestive hepatopathy from RV failure).  No evidence of decompensation. CT A/P (5/20) showed stable small volume ascites and mild mesenteric/retroperitoneal  "edema/cholelithiasis. Hepatology consulted 5/30, no concern for HE though at risk for. AST 64 and ALT wnl on admit.   - goal 2 BM daily, scheduled miralax hold for loose stools  - intermittent  LFTs     # BEATRIZ  Worsening anxiety lately per Chapis, wife. Previously on paxil but was weaned off in May (when he had ecephalopthy) and started on seroquel. Wife requesting an alterative and patient agreeable.  - increased seroquel 25 mg at HS then decreased back to 12.5 mg per patient made him drowsy  - prn atrax  for itching, anxiety  - patient requesting to resume paxil, started 10 mg on 7/29, titrate as outpatient     OTHER/CHRONIC CONDITIONS:   Hypothyroidism:  7/27 TSH 7.5 free T4 1.4, PTA levothyroxine 100mcg qAM.   GERD:  PTA pantoprazole   Normocytic anemia/BELA:  Baseline 8-9, stable. PTA ferrous sulfate 325mg daily.   BPH:  PTA finasteride 5mg daily and tamsulosin 0.8mg qPM.  DMII:  A1c 6.3 , LDSSI, hypoglycemia protocol while inpatient, diet controlled as outpatient   Gout:  pta allopurinol 200mg daily  h/o HIT  ABHINAV:  pta CPAP      Siena Aguiar DNP, NP-C  Advanced Heart Failure/Cardiology II Service  Pager [phone] [facility] [id number]      Patient discussed with Dr. Cisneros.        40 minutes spent on the date of the encounter doing chart review, history and exam, documentation and further activities per the note    ================================================================    Subjective/24-Hr Events:   Last 24 hr care team notes reviewed. No overnight events. Slept better overnight. Knee pain better. Good urine output, weight down 1 lb.     ROS:  4 point ROS including respiratory, CV, GI and  (other than that noted in the HPI) is negative.     Medications: Reviewed in EPIC.     Physical Exam:   /88 (BP Location: Right arm)   Pulse 105   Temp 97.9  F (36.6  C) (Oral)   Resp 20   Ht 1.702 m (5' 7\")   Wt 87.9 kg (193 lb 12.8 oz)   SpO2 98%   BMI 30.35 kg/m      GENERAL: Appears comfortable, in no " distress.  HEENT: Eye symmetrical, no discharge or icterus bilaterally. Mucous membranes moist and without lesions.  NECK: Supple, JVD to jaw at 75 degrees.   CV: +VAD hum   RESPIRATORY: Respirations regular, even, and unlabored. Lungs clear/dim anteriorly  GI: Firm but improved, obese and moderately distended with normoactive bowel sounds present in all quadrants. No tenderness, rebound, guarding.   EXTREMITIES: 1+ BLE peripheral edema to knees R>L. Non pulsatile.   NEUROLOGIC: Alert and oriented x 3. No focal deficits. [place] per baseline.   MUSCULOSKELETAL: No joint swelling or tenderness.   SKIN: No jaundice. No rashes or lesions.     Labs:  CMP  Recent Labs   Lab 07/31/23  0845 07/31/23  0758 07/31/23  0453 07/30/23  2122 07/30/23  1814 07/30/23  1807 07/30/23  0823 07/30/23  0634 07/29/23  2224 07/29/23  1854 07/27/23  1636 07/27/23  1031   NA  --   --  135*  --   --  133*  --  134*  --  134*   < > 126*   POTASSIUM  --   --  4.4  --   --  3.9  --  4.4  --  4.3   < > 4.6   CHLORIDE  --   --  101  --   --  99  --  101  --  100   < > 94*   CO2  --   --  20*  --   --  19*  --  18*  --  18*   < > 16*   ANIONGAP  --   --  14  --   --  15  --  15  --  16*   < > 16*   * 173* 145* 163*   < > 248*   < > 145*   < > 175*   < > 280*   BUN  --   --  91.5*  --   --  94.9*  --  93.5*  --  101.0*   < > 105.0*   CR  --   --  3.30*  --   --  2.71*  --  3.42*  --  3.57*   < > 3.38*   GFRESTIMATED  --   --  19*  --   --  24*  --  19*  --  18*   < > 19*   CALOS  --   --  8.4*  --   --  8.3*  --  8.5*  --  8.4*   < > 8.3*   MAG  --   --  1.9  --   --  1.9  --  2.0  --  2.1   < >  --    PROTTOTAL  --   --   --   --   --   --   --   --   --   --   --  6.9   ALBUMIN  --   --   --   --   --   --   --   --   --   --   --  3.6   BILITOTAL  --   --   --   --   --   --   --   --   --   --   --  0.4   ALKPHOS  --   --   --   --   --   --   --   --   --   --   --  108   AST  --   --   --   --   --   --   --   --   --   --   --  64*   ALT   --   --   --   --   --   --   --   --   --   --   --  39    < > = values in this interval not displayed.       CBC  Recent Labs   Lab 07/31/23 0453 07/30/23  0634 07/29/23  0609 07/27/23  1031   WBC 9.5 10.5 10.5 11.7*   RBC 3.15* 3.23* 2.80* 2.92*   HGB 8.3* 8.7* 7.7* 8.1*   HCT 28.3* 29.0* 24.9* 27.2*   MCV 90 90 89 93   MCH 26.3* 26.9 27.5 27.7   MCHC 29.3* 30.0* 30.9* 29.8*   RDW 19.3* 19.2* 18.9* 19.4*    241 217 237         INR  Recent Labs   Lab 07/31/23 0453 07/30/23  0634 07/29/23  0609 07/28/23  0540   INR 2.46* 2.41* 2.47* 2.37*

## 2023-07-31 NOTE — PROGRESS NOTES
07/31/23 1400   Appointment Info   Signing Clinician's Name / Credentials (PT) Ru Qureshi, PT   Rehab Comments (PT) LVAD   Living Environment   People in Home spouse;grandchild(arnold)   Current Living Arrangements house   Home Accessibility no concerns   Transportation Anticipated family or friend will provide   Living Environment Comments ramp to enter   Self-Care   Usual Activity Tolerance moderate   Current Activity Tolerance fair   Fall history within last six months no   Activity/Exercise/Self-Care Comment Patient mostly independent w/ ADLs at home; his wife is still working and so he is home alone most of the day. Very limited activity tolerance but enjoys working on projects in the garage.   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 He was hypotensive in ED so started on dopamine in addition to dobutamine.   General Observations RA, LVAD numbers WNL   Cognition   Affect/Mental Status (Cognition) WFL   Orientation Status (Cognition) oriented to;place;person;situation   Strength (Manual Muscle Testing)   Strength Comments generalized weakness/deconditioning as demonstrated by slow movements and quick to fatigue.   Bed Mobility   Comment, (Bed Mobility) SBA   Transfers   Comment, (Transfers) Close SBA sit<>stand   Gait/Stairs (Locomotion)   Comment, (Gait/Stairs) Ambulates 3 ft fwds/bkwds with 4WW and CGA, refuses further ambulation due to fatigue   Balance    Balance Comments relies on 4WW in standing   Clinical Impression   Criteria for Skilled Therapeutic Intervention Yes, treatment indicated   PT Diagnosis (PT) impaired functional mobility   Influenced by the following impairments weakness/deconditioning   Functional limitations due to impairments ambulation, transfers   Clinical Presentation (PT Evaluation Complexity) Stable/Uncomplicated   Clinical Presentation Rationale clinical judgement   Clinical Decision Making (Complexity) low complexity   Planned Therapy Interventions (PT) balance training;bed mobility training;gait training;home exercise program;neuromuscular re-education;stair training;strengthening   Risk & Benefits of therapy have been explained evaluation/treatment results reviewed;care plan/treatment goals reviewed;risks/benefits reviewed;current/potential barriers reviewed;participants voiced agreement with care plan;participants included;patient   PT Total Evaluation Time   PT Eval, Low Complexity Minutes (36929) 6   Physical Therapy Goals   PT Frequency 5x/week   PT Predicted Duration/Target Date for Goal Attainment 08/12/23   PT Goals Gait;PT Goal 1   PT: Gait Modified independent;Greater than 200 feet  (with 4WW)   PT: Goal 1 Patient to be ind with HEP in order to improve strength/endurance   PT Discharge Planning   PT Plan gait endurance, sit to stands   PT Discharge Recommendation (DC Rec) home with assist;home with home care physical therapy   PT Rationale for DC Rec Patient quite deconditioned and fatigued but able to transfer and ambulate s hort distance. Anticipate he will DC home with family assist and continued therapy at home/outpatient if patient interested.   PT Brief overview of current status Ax1 with FWW or 4WW and gait belt   Total Session Time   Total Session Time (sum of timed and untimed services) 6

## 2023-08-01 NOTE — PROGRESS NOTES
Neuro: A&O x 4. Afebrile. Refused overnight vitals.  Cardiac: ST with BBB. MAPs 80s. Denies dizziness, chest pain, or palpitations. LVAD #s WNL. No alarms.   Respiratory: Sating well on RA. LS clear. Denies cough.   GI/: Good UOP via bedside urinal. LBM 7/31. Denies nausea.   Endocrine: ACHS  Diet/Appetite: 2G Na. 2L FR  Skin: Generalized bruising. LVAD dressing CDI.   LDA: R DL PICC. L PIV SL. PICC infusing Bumex at 2 mg/hr (8 mL/hr) and Dobutamine at 5 mcg/kg/min (12.7 mL/hr).  Activity: AO1 with walker.   Pain: Denies     Plan: Continue to monitor and alert team with any changes or concerns.

## 2023-08-01 NOTE — PLAN OF CARE
"/88 (BP Location: Right arm)   Pulse 100   Temp 97.4  F (36.3  C) (Oral)   Resp 18   Ht 1.702 m (5' 7\")   Wt 87.1 kg (192 lb)   SpO2 92%   BMI 30.07 kg/m       Status: Adm 7/27 w/ c/o shortness of breath, fatigue, and weight gain c/w fluid overload due to end stage RV failure. 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 Able to make needs known. [place], hearing aides at the bedside   Respiratory: RA, sating high 90s. Denies SOB   Cardiac: ST w/ BBB. Doppler MAPS 80s. LVAD #'s WNL, no alarms   GI/: Voids w/out difficulty. LBM today   Diet: 2g NA, 1800 ml FR. BS ACHS  Pain: Denies   Skin: LVAD driveline dressing changed and CDI   IV Access: R DL PICC infusing bumex 2mg/hr and dobutamine 5 mcg/kg/min (12.7ml/hr)  Labs: Reviewed   Activity: Ax1 w/ walker      New changes this shift: No changes, pt worked w/ PT today. Activity encouraged   Plan: Continue diuresing and notify Cards 2 with any changes or concerns.      "

## 2023-08-01 NOTE — PROGRESS NOTES
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 He was hypotensive in ED so started on dopamine in addition to dobutamine.     Today's Plan:  - continue dobutamine at 5  - IV diuril 1000 mg BID for today, goal net negative 2-3  - update wife Chapis       # Acute on chronic biventricular systolic heart failure secondary to NICM (LVEF 15-20% per TTE 9/2022)  # s/p HM3 LVAD (2/18/20, DT) c/b late RV failure  # Inotrope dependence/cardiogenic shock  # Troponinemia, demand ischemia due to HF  Stage D, NYHA Class IV. Multiple hospitalizations for decompensated RV failure.   RHC 6/21/23 RA 15, mPA 26, PCW 16, CI 2.57 at LVAD speed 5800rpm.  VAD speed increased to 5900 rpm. He was started on IV dobutamine 2.5 mcg/kg/min and discharged home on this. Palliative care was consulted, plan is to continue aggressive medical management.      Patient presented with ~20 lb weight gain (199 lb, EDW ~180 lb), elevated NT pro BNP, BERNARDA on CKD, hyponatremia all c/w hypervolemia due to severe BiV heart failure. Trop mildly elevated, per baseline, due to demand ischemia with HF. Developed hypotension in the ED MAP mid 50s so briefly started on dopamine 2.5 mcg/kg/min.      - Fluid status: hypervolemic, Bumex gtt 2mg/hr, HOLD hydrochlorothiazide 75 mg daily for hypotension, IV diuril 1000 mg 1-2 times today goal net neg 3L, hold PTA torsemide 100 mg TID  - RV support/inotropy: dobutamine 5mcg/kg/min increased 7/28 (briefly on dopamine for hypotension)  - ACEi/ARB/ARNi: deferred  due to renal dysfunction  - Afterload reduction: holding PTA hydrochlorothiazide 75 mg TID, resume closer to discharge   - BB:  contraindicated due to RV dysfunction  - Aldosterone antagonist: contraindicated due to renal dysfunction  - SGLT2i: Jardiance d/c'ed due to renal dysfunction  - SCD ppx:  ICD  - Antiplatelet: not on PTA, due to past epistaxis  - Anticoagulation: warfarin goal INR 1.7-2.3 (due to epistaxis). INR today 2.53  - MAPs:  70s-80s  - LDH:  baseline 300s -> 435 on admission, 362 most recently  - MEMS: CardioMEMS 26 7/30 (baseline ~22), check intermittently  - GOC: prior conversations patient and wife consistent with restorative/aggressive GOC, continue to discuss given progression of disease     # Chronic LVAD drive line infection, MSSA and PSA  Follows with LVAD ID. On outpatient IV cefepime. Infection appears to be well-controlled currently.WBC 11.7 on admissions but per wife, site looks better than it has in awhile.  -continue pta IV cefepime q24hr, renally dose 1 g     # Leukocytosis, mild, resolved  WBC 11.7 on admission.  No signs of infection, afebrile. recurrent issue during prior admissions. WBC now normal.   - daily CBC for now  - low threshold to culture and broaded abx     # BERNARDA on CKD, cardiorenal  Cr 3.4 on admission, baseline is mid 2s. BERNARDA econdary to CRS/hypervolemia. Improved with diuresis  - q12hr BMP  - diuresis as above  - dobutamine as above     # NSVT  - continue pta amiodarone 200mg daily  - aim for K>4 Mg>2.      # Hypervolemic hyponatremia, chronic, improved  Na 126 on admission, hypervolemic hyponatremia in setting of HF. Anticipate will improve with diuresis,  - q21hr BMP     # Liver cirrhosis with portal hypertension  Liver biopsy during recent admission revealed cirrhosis - likely multifactorial (remote EtOH misuse, NAFLD, chronic congestive hepatopathy from RV failure).  No evidence of decompensation. CT A/P (5/20) showed stable small volume ascites and mild  "mesenteric/retroperitoneal edema/cholelithiasis. Hepatology consulted 5/30, no concern for HE though at risk for. AST 64 and ALT wnl on admit.   - goal 2 BM daily, scheduled miralax hold for loose stools  - intermittent  LFTs     # BEATRIZ  Worsening anxiety lately per Chapis, wife. Previously on paxil but was weaned off in May (when he had ecephalopthy) and started on seroquel. Wife requesting an alterative and patient agreeable.  - increased seroquel 25 mg at HS then decreased back to 12.5 mg per patient made him drowsy  - prn atrax  for itching, anxiety  - patient requesting to resume paxil, started 10 mg on 7/29, titrate as outpatient         OTHER/CHRONIC CONDITIONS:   Hypothyroidism:  7/27 TSH 7.5 free T4 1.4, PTA levothyroxine 100mcg qAM.   GERD:  PTA pantoprazole   Normocytic anemia/BELA:  Baseline 8-9, stable. PTA ferrous sulfate 325mg daily.   BPH:  PTA finasteride 5mg daily and tamsulosin 0.8mg qPM.  DMII:  A1c 6.3 , LDSSI, hypoglycemia protocol while inpatient, diet controlled as outpatient   Gout:  pta allopurinol 200mg daily  h/o HIT  ABHINAV:  pta CPAP     ================================================================    Subjective/24-Hr Events:   Last 24 hr care team notes reviewed. No overnight events. Continues to feel volume up in abdomen, although improving. MIRANDA stable, no acute SOB. Denies CP, lightheadedness, orthopnea, PND, LE edema. Sleeps with CPAP. No nausea or vomiting. No s/s of bleeding. No VAD alarms. Down 1 lb in 24 hr.     ROS:  4 point ROS including respiratory, CV, GI and  (other than that noted in the HPI) is negative.     Medications: Reviewed in EPIC.     Physical Exam:   /88 (BP Location: Right arm)   Pulse 105   Temp 97.6  F (36.4  C) (Oral)   Resp 18   Ht 1.702 m (5' 7\")   Wt 87.1 kg (192 lb)   SpO2 98%   BMI 30.07 kg/m      GENERAL: Appears comfortable, in no distress.  HEENT: Eye symmetrical, no discharge or icterus bilaterally. Mucous membranes moist and without " lesions.  NECK: Supple, JVD at mandible at 30 degrees.   CV: tachycardic, + LVAD hum  RESPIRATORY: Respirations regular, even, and unlabored. Lungs CTA throughout.    GI: Soft and obese with normoactive bowel sounds present in all quadrants. No tenderness, rebound, guarding.   EXTREMITIES: No peripheral edema. 2+ bilateral pedal pulses.   NEUROLOGIC: Alert and oriented x 3. No focal deficits.   MUSCULOSKELETAL: No joint swelling or tenderness.   SKIN: No jaundice. No rashes or lesions.     Labs:  CMP  Recent Labs   Lab 08/01/23  0704 08/01/23  0426 07/31/23  2136 07/31/23  1739 07/31/23  0758 07/31/23  0453 07/30/23  1814 07/30/23  1807 07/27/23  1636 07/27/23  1031   NA  --  135*  --  134*  --  135*  --  133*   < > 126*   POTASSIUM  --  4.5  --  4.6  --  4.4  --  3.9   < > 4.6   CHLORIDE  --  101  --  101  --  101  --  99   < > 94*   CO2  --  21*  --  19*  --  20*  --  19*   < > 16*   ANIONGAP  --  13  --  14  --  14  --  15   < > 16*   * 181* 213* 222*   < > 145*   < > 248*   < > 280*   BUN  --  85.7*  --  92.2*  --  91.5*  --  94.9*   < > 105.0*   CR  --  2.59*  --  2.88*  --  3.30*  --  2.71*   < > 3.38*   GFRESTIMATED  --  26*  --  23*  --  19*  --  24*   < > 19*   CALOS  --  8.3*  --  8.4*  --  8.4*  --  8.3*   < > 8.3*   MAG  --  1.9  --  1.8  --  1.9  --  1.9   < >  --    PROTTOTAL  --   --   --   --   --   --   --   --   --  6.9   ALBUMIN  --   --   --   --   --   --   --   --   --  3.6   BILITOTAL  --   --   --   --   --   --   --   --   --  0.4   ALKPHOS  --   --   --   --   --   --   --   --   --  108   AST  --   --   --   --   --   --   --   --   --  64*   ALT  --   --   --   --   --   --   --   --   --  39    < > = values in this interval not displayed.       CBC  Recent Labs   Lab 08/01/23  0426 07/31/23  0453 07/30/23  0634 07/29/23  0609   WBC 8.4 9.5 10.5 10.5   RBC 3.05* 3.15* 3.23* 2.80*   HGB 8.2* 8.3* 8.7* 7.7*   HCT 27.4* 28.3* 29.0* 24.9*   MCV 90 90 90 89   MCH 26.9 26.3* 26.9 27.5    MCHC 29.9* 29.3* 30.0* 30.9*   RDW 19.5* 19.3* 19.2* 18.9*    253 241 217       INR  Recent Labs   Lab 08/01/23  0426 07/31/23  0453 07/30/23  0634 07/29/23  0609   INR 2.53* 2.46* 2.41* 2.47*       Time/Communication  I personally spent a total of 35 minutes. Of that 15 minutes was counseling/coordination of patient's care. Plan of care discussed with patient. See my note above for details.    Patient discussed with Dr. Cisneros.        Francoise Peters, CORY, NP-C  Advanced Heart Failure/Cardiology II Service  Pager [phone] [facility] [phone]

## 2023-08-01 NOTE — PROCEDURES
The patient's HeartMate 3 LVAD was interrogated 8/1/2023  * Speed 5900 rpm   * Pulsatility index 3.7-3.9   * Power 4.8-4.9 Driver   * Flow 5.1-5.2 L/minute   Fluid status: hypervolemic   Alarms were reviewed. Rare PI events, no power spikes, speed drops, or other findings suspicious of pump malfunction noted.   The driveline exit site was inspected, CDI.   All external components were inspected and showed no evidence of damage or malfunction, none replaced.   No changes to VAD settings made

## 2023-08-02 NOTE — PROGRESS NOTES
Shift [id number]    DX: Adm 7/27 w/ c/o shortness of breath, fatigue, and weight gain c/w fluid overload due to end stage RV failure. 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 Call light appropriate.    Cardiac: ST, Doppler Maps in the 80s LVAD #'s Wnl no alarms        Respiratory:  O2 saturation > 92% on RA.   GI/:  Adequate UO via urinal w/o difficulty LBM early this morning  Diet/appetite:  2g NA 1800ml FR BS ACHs within normal no insulin given  Activity:  standby assist.   Pain:  5/10 in Lower Abdominal, ABD xray completed this morning   Skin: LVAD driveline dressing CDI   LDA's: R DL PICC infusing bumex 2mg/hr and dobutamine 5 mcg/kg/min (12.7ml/hr) and LPIV running TKO  for IV antibiotics  Plan: Continue diuresing and notify Cards 2 with any changes or concerns.

## 2023-08-02 NOTE — PROGRESS NOTES
D: Stopped by to see patient. No VAD related questions or concerns at this time.   I: Discussed POC and provided support and listened to patient and caregiver's thoughts and concerns.  P: Continue to follow patient and address any questions or concerns patient and or caregiver may have.

## 2023-08-02 NOTE — PLAN OF CARE
"/88 (BP Location: Right arm)   Pulse 103   Temp 97.4  F (36.3  C) (Oral)   Resp 18   Ht 1.702 m (5' 7\")   Wt 88.4 kg (194 lb 12.8 oz)   SpO2 97%   BMI 30.51 kg/m       Status: Adm 7/27 w/ c/o shortness of breath, fatigue, and weight gain c/w fluid overload due to end stage RV failure. 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 Able to make needs known. [place], hearing aides at the bedside   Respiratory: RA, sating high 90s. Denies SOB   Cardiac: ST w/ BBB. Doppler MAPS 80s. LVAD #'s WNL, no alarms   GI/: Voids w/out difficulty. Loose BM's x2 today   Diet: 2g NA, 1800 ml FR. No appetite today d/t abd pain/discomfort. BS ACHS  Pain: 3-5/10 RLQ pain. PRN tylenol, tramadol, and ice/hot packs offered, but pt declined  Skin: LVAD driveline dressing changed and CDI  IV Access: R DL PICC infusing bumex 2mg/hr and dobutamine 5 mcg/kg/min (12.7ml/hr). L PIV SL   Labs: Reviewed   Activity: Ax1 w/ walker      New changes this shift: CT of abd completed for RLQ pain. Additional 6mg of IV bumex given x1 to promote fluid removal.     Plan: Continue diuresing and notify Cards 2 with any changes or concerns.        "

## 2023-08-02 NOTE — PROCEDURES
The patient's HeartMate 3 LVAD was interrogated 8/2/2023  * Speed 5900 rpm   * Pulsatility index 3.4-3.9   * Power 4.8-5.0 Driver   * Flow 5.1-5.3 L/minute   Fluid status: hypervolemic   Alarms were reviewed. No PI events, no power spikes, speed drops, or other findings suspicious of pump malfunction noted. The driveline exit site was inspected, C/D/I.   All external components were inspected and showed no evidence of damage or malfunction, none replaced. No changes to VAD settings made

## 2023-08-02 NOTE — PROGRESS NOTES
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 He was hypotensive in ED so started on dopamine in addition to dobutamine.     Today's Plan:  - continue dobutamine at 5  - continue bumex 2 mg/hr, plus bumex 6 mg IV bolus  - diuril 1000 mg x 1  - CT abdomen/pelvis w/o contrast for new acute R>L lower quadrant pain   - echo   - update wife Chapis       # Acute on chronic biventricular systolic heart failure secondary to NICM (LVEF 15-20% per TTE 9/2022)  # s/p HM3 LVAD (2/18/20, DT) c/b late RV failure  # Inotrope dependence/cardiogenic shock  # Troponinemia, demand ischemia due to HF  Stage D, NYHA Class IV. Multiple hospitalizations for decompensated RV failure.   RHC 6/21/23 RA 15, mPA 26, PCW 16, CI 2.57 at LVAD speed 5800rpm.  VAD speed increased to 5900 rpm. He was started on IV dobutamine 2.5 mcg/kg/min and discharged home on this. Palliative care was consulted, plan is to continue aggressive medical management.      Patient presented with ~20 lb weight gain (199 lb, EDW ~180 lb), elevated NT pro BNP, BERNARDA on CKD, hyponatremia all c/w hypervolemia due to severe BiV heart failure. Trop mildly elevated, per baseline, due to demand ischemia with HF. Developed hypotension in the ED MAP mid 50s so briefly started on dopamine 2.5 mcg/kg/min.      - Fluid status: hypervolemic, Bumex gtt 2mg/hr, HOLD hydrochlorothiazide 75 mg daily for hypotension, IV diuril 1000 mg 1-2 times today goal net neg 3L, hold PTA torsemide 100 mg TID  - RV support/inotropy:  dobutamine 5mcg/kg/min increased 7/28 (briefly on dopamine for hypotension)  - ACEi/ARB/ARNi: deferred due to renal dysfunction  - Afterload reduction: holding PTA hydrochlorothiazide 75 mg TID, resume closer to discharge   - BB:  contraindicated due to RV dysfunction  - Aldosterone antagonist: contraindicated due to renal dysfunction  - SGLT2i: Jardiance d/c'ed due to renal dysfunction  - SCD ppx:  ICD  - Antiplatelet: not on PTA, due to past epistaxis  - Anticoagulation: warfarin goal INR 1.7-2.3 (due to epistaxis). INR today 2.53  - MAPs:  70s-80s  - LDH:  baseline 300s -> 435 on admission, 362 most recently  - MEMS: CardioMEMS 26 7/30 and 8/2 (baseline ~22), check intermittently  - GOC: prior conversations patient and wife consistent with restorative/aggressive GOC, continue to discuss given progression of disease     # Chronic LVAD drive line infection, MSSA and PSA  Follows with LVAD ID. On outpatient IV cefepime. Infection appears to be well-controlled currently.WBC 11.7 on admissions but per wife, site looks better than it has in awhile.  -continue pta IV cefepime q24hr, renally dose 1 g     # Leukocytosis, mild, resolved  WBC 11.7 on admission.  No signs of infection, afebrile. recurrent issue during prior admissions. WBC now normal.   - daily CBC for now  - low threshold to culture and broaded abx     # BERNARDA on CKD, cardiorenal  Cr 3.4 on admission, baseline is mid 2s. BERNARDA econdary to CRS/hypervolemia. Improved with diuresis  - q12hr BMP  - diuresis as above  - dobutamine as above     # NSVT  - continue pta amiodarone 200mg daily  - aim for K>4 Mg>2.      # Hypervolemic hyponatremia, chronic, improved  Na 126 on admission, hypervolemic hyponatremia in setting of HF. Anticipate will improve with diuresis,  - q21hr BMP     # Liver cirrhosis with portal hypertension  Liver biopsy during recent admission revealed cirrhosis - likely multifactorial (remote EtOH misuse, NAFLD, chronic congestive hepatopathy from  "RV failure).  No evidence of decompensation. CT A/P (5/20) showed stable small volume ascites and mild mesenteric/retroperitoneal edema/cholelithiasis. Hepatology consulted 5/30, no concern for HE though at risk for. AST 64 and ALT wnl on admit.   - goal 2 BM daily, scheduled miralax hold for loose stools  - intermittent  LFTs     # BEATRIZ  Worsening anxiety lately per [place], wife. Previously on paxil but was weaned off in May (when he had ecephalopthy) and started on seroquel. Wife requesting an alterative and patient agreeable.  - increased seroquel 25 mg at HS then decreased back to 12.5 mg per patient made him drowsy  - prn atrax  for itching, anxiety  - patient requesting to resume paxil, started 10 mg on 7/29, titrate as outpatient         OTHER/CHRONIC CONDITIONS:   Hypothyroidism:  7/27 TSH 7.5 free T4 1.4, PTA levothyroxine 100mcg qAM.   GERD:  PTA pantoprazole   Normocytic anemia/BELA:  Baseline 8-9, stable. PTA ferrous sulfate 325mg daily.   BPH:  PTA finasteride 5mg daily and tamsulosin 0.8mg qPM.  DMII:  A1c 6.3 , LDSSI, hypoglycemia protocol while inpatient, diet controlled as outpatient   Gout:  pta allopurinol 200mg daily  h/o HIT  ABHINAV:  pta CPAP     ================================================================    Subjective/24-Hr Events:   Last 24 hr care team notes reviewed. Increased abdominal distention with new and worsening RLQ>LLQ pain. + 2 lb in last 2 hr, only 1L net negative. No appetite this morning. + flatus and BM. Denies nausea or vomiting. Denies CP, SOB, lightheadedness, orthopnea, or PND.     ROS:  4 point ROS including respiratory, CV, GI and  (other than that noted in the HPI) is negative.     Medications: Reviewed in EPIC.     Physical Exam:   /88 (BP Location: Right arm)   Pulse 108   Temp 98.5  F (36.9  C) (Oral)   Resp 18   Ht 1.702 m (5' 7\")   Wt 88.4 kg (194 lb 12.8 oz)   SpO2 92%   BMI 30.51 kg/m      GENERAL: Appears comfortable, in no distress.  HEENT: Eye " symmetrical, no discharge or icterus bilaterally. Mucous membranes moist and without lesions.  NECK: Supple, JVD at tragus at 30 degrees.   CV: tachycardic, + LVAD hum  RESPIRATORY: Respirations regular, even, and unlabored. Lungs CTA throughout.    GI: Soft and obese with increased distention, with normoactive bowel sounds present in all quadrants. RLQ>LLQ pain with rebound tenderness, guarding.   EXTREMITIES: Trace BLE peripheral edema. 2+ bilateral pedal pulses.   NEUROLOGIC: Alert and oriented x 3. No focal deficits.   MUSCULOSKELETAL: No joint swelling or tenderness.   SKIN: No jaundice. No rashes or lesions.     Labs:  CMP  Recent Labs   Lab 08/02/23  0601 08/01/23  2148 08/01/23  1749 08/01/23  1738 08/01/23  0704 08/01/23  0426 07/31/23  2136 07/31/23  1739 07/27/23  1636 07/27/23  1031   *  --  132*  --   --  135*  --  134*   < > 126*   POTASSIUM 4.5  --  5.0  --   --  4.5  --  4.6   < > 4.6   CHLORIDE 100  --  98  --   --  101  --  101   < > 94*   CO2 20*  --  20*  --   --  21*  --  19*   < > 16*   ANIONGAP 14  --  14  --   --  13  --  14   < > 16*   * 140* 155* 145*   < > 181*   < > 222*   < > 280*   BUN 93.7*  --  89.4*  --   --  85.7*  --  92.2*   < > 105.0*   CR 3.33*  --  3.31*  --   --  2.59*  --  2.88*   < > 3.38*   GFRESTIMATED 19*  --  19*  --   --  26*  --  23*   < > 19*   CALOS 8.6*  --  8.7*  --   --  8.3*  --  8.4*   < > 8.3*   MAG 1.9  --  1.9  --   --  1.9  --  1.8   < >  --    PROTTOTAL  --   --   --   --   --   --   --   --   --  6.9   ALBUMIN  --   --   --   --   --   --   --   --   --  3.6   BILITOTAL  --   --   --   --   --   --   --   --   --  0.4   ALKPHOS  --   --   --   --   --   --   --   --   --  108   AST  --   --   --   --   --   --   --   --   --  64*   ALT  --   --   --   --   --   --   --   --   --  39    < > = values in this interval not displayed.       CBC  Recent Labs   Lab 08/02/23  0601 08/01/23  0426 07/31/23  0453 07/30/23  0634   WBC 9.7 8.4 9.5 10.5   RBC  3.20* 3.05* 3.15* 3.23*   HGB 8.6* 8.2* 8.3* 8.7*   HCT 28.2* 27.4* 28.3* 29.0*   MCV 88 90 90 90   MCH 26.9 26.9 26.3* 26.9   MCHC 30.5* 29.9* 29.3* 30.0*   RDW 19.4* 19.5* 19.3* 19.2*    220 253 241       INR  Recent Labs   Lab 08/02/23  0601 08/01/23  0426 07/31/23  0453 07/30/23  0634   INR 2.48* 2.53* 2.46* 2.41*       Time/Communication  I personally spent a total of 40 minutes. Of that 15 minutes was counseling/coordination of patient's care. Plan of care discussed with patient. See my note above for details.    Patient discussed with Dr. Cisneros.        Francoise Peters, CORY, NP-C  Advanced Heart Failure/Cardiology II Service  Pager [phone] [phone]

## 2023-08-03 NOTE — PROGRESS NOTES
CLINICAL NUTRITION SERVICES - REASSESSMENT NOTE     Nutrition Prescription    RECOMMENDATIONS FOR MDs/PROVIDERS TO ORDER:  None at this time.     Malnutrition Status:    Patient does not meet two of the established criteria necessary for diagnosing malnutrition but is at risk for malnutrition    Recommendations already ordered by Registered Dietitian (RD):  Modified oral supplements    Future/Additional Recommendations:  1. Rec continue current diet, as ordered. Monitor ability to liberalize diet order/fluid restriction if becomes warranted. Rec small, frequent meals and use of oral supplements.    2. Monitor stooling patterns and potential need to adjust bowel regimen.   3. Continue to replace potassium as needed.  4. Order a multivitamin with minerals to help meet micronutrient needs, if inadequate intake continues.   5. Consider thiamine supplementation if warranted.     EVALUATION OF THE PROGRESS TOWARD GOALS   Diet: 2 g Sodium (since 7/27) and 1800 mL Fluid Restriction (since 7/28). Ordered to receive chocolate Ensure Enlive at 14:00.   Intake/Tolerance: Tolerating diet. Per nursing flowsheets (% intake), pt consuming 100% with a fair to good appetite 7/30, 100% with a good appetite 8/1 and 8/2. Skipping meals at times. [employer] (meal ordering system) indicates food/beverages sent 7/31-8/2 totaled 1300 kcals and 53 g protein daily on average. Ordering an average of two meals daily. A recent factor affecting oral intake include abdominal pain/fullness. Per discussion with pt (8/3), feels his appetite is improving a little and has less abdominal pain. Likes the chocolate Ensure Enlive.     Nutrition Support: None this admission.     NEW FINDINGS   WOC: Not following at this time  GI: Having zero to four stools daily on average. Last Bowel Movement: 08/02/23. Stools are loose and brown. On scheduled miralax, often held. Pt had abdominal pain/fullness on 8/2 but per pt, this has improved some today (8/3).   Wt  Hx: 87.1 kg (3/17/21), 99.3 kg (6/24/2022), 80.9 kg (9/11/2022), 79.9 kg (12/20/2022), 91.6 kg (3/23/23), 80 kg (5/31/2023), 87.1 kg (6/20/2023),83.6 kg (6/26/2023), 90.3 kg (7/27/23, admit), 86 kg (8/3)- Variable weights with fluid status; diuresis this admission. Difficult to assess actual wt change in wt due to fluid status.      ASSESSED NUTRITION NEEDS (for inpatient hospital stay)  Dosing Weight: 72 kg (adjusted, based on lowest wt so far this admission of 86.6 kg on 8/3)  Estimated Energy Needs: 7505-3045 kcals/day (25-30 kcals/kg)  Justification: Maintenance needs  Estimated Protein Needs: 72-86 grams protein/day (1 - 1.2 grams of pro/kg)  Justification: Increased needs with cardiac status but rec above range due to renal function  Estimated Fluid Needs: On a 1.8 L fluid restriction      MALNUTRITION  % Intake: < 75% for > 7 days (moderate)  % Weight Loss: Difficult to assess wt changes with changes in fluid status and diuresis  Subcutaneous Fat Loss: None observed, but difficult to assess with fluid status  Muscle Loss: None observed, but difficult to assess with fluid status  Fluid Accumulation/Edema:Trace  Malnutrition Diagnosis: Patient does not meet two of the established criteria necessary for diagnosing malnutrition but is at risk for malnutrition    Previous Goals   Patient to consume % of nutritionally adequate meal trays TID, or the equivalent with supplements/snacks.  Evaluation: Not meeting consistently    Previous Nutrition Diagnosis  Inadequate oral intake related to reduced appetite as evidenced by report of pt's wife, eating % of smaller meals this admit.    Evaluation: Unresolved, updated below.     CURRENT NUTRITION DIAGNOSIS  Inadequate oral intake related to abdominal pain/fullness as evidenced by pt skipping meals at times and food/beverages sent 7/31-8/2 totaled 1300 kcals and 53 g protein daily on average while estimated needs are 6473-1912 kcals/day (25-30 kcals/kg) and  72-86 grams protein/day (1 - 1.2 grams of pro/kg).    INTERVENTIONS  Implementation  Nutrition Education: Encouraged oral intake and intake of oral supplements. Explained diet restrictions. Gave sodium room service menu and explained allocations.   Medical food supplement therapy: Discussed oral supplements. Pt would also like strawberry Ensure Enlive. Ordered to rotate these flavors.      Goals  Patient to consume % of nutritionally adequate meal trays TID, or the equivalent with supplements/snacks.    Monitoring/Evaluation  Progress toward goals will be monitored and evaluated per protocol.     Nutrition will continue to follow.      Abby Woods, MS, RD, LD, [facility]   6C/7C(beds 6001 - 5834) RD pgr: [phone]

## 2023-08-03 NOTE — PROVIDER NOTIFICATION
lab result: Potassium 5.0. Pt had scheduled 100meq KCl.  Provider notified: Dr. Varghese notified and agreed with holding potassium.

## 2023-08-03 NOTE — PROGRESS NOTES
.Major Shift Events: 1x dose of Bumex. Mid day Potassium recheck 3.7. c/o increased cramping in hands- PRN's given   Plan: Unit collect- scheduled labs at 1800   For vital signs and complete assessments, please see documentation flowsheets

## 2023-08-03 NOTE — PLAN OF CARE
Status: Adm 7/27 w/ c/o shortness of breath, fatigue, and weight gain c/w fluid overload due to end stage RV failure. 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 Able to make needs known. [place], hearing aides at the bedside   Respiratory: RA, sating high 90s. Denies SOB   Cardiac: ST w/ BBB, occ Vpacing. Doppler MAPS 80s. LVAD #'s WNL, no alarms   GI/: Voids w/out difficulty. Loose BM's x2 this shift  Diet: 2g NA, 1800 ml FR. No appetite today d/t abd pain/discomfort. BS ACHS  Pain: 3-5/10 RLQ pain. PRN tylenol, tramadol given  Skin: LVAD driveline   IV Access: R DL PICC infusing bumex 2mg/hr and dobutamine 5 mcg/kg/min (12.7ml/hr). L PIV SL   Labs: Reviewed, RN collect Q12  Activity: Ax1 w/ walker      New: Additional dose of IV diuril given x1 to promote fluid removal.      Plan: Continue diuresing and notify Cards 2 with any changes or concerns.

## 2023-08-03 NOTE — PLAN OF CARE
Goal Outcome Evaluation:      Plan of Care Reviewed With: patient    Overall Patient Progress: no changeOverall Patient Progress: no change    Outcome Evaluation: Rec continue current diet, as ordered. Monitor ability to liberalize diet order/fluid restriction if becomes warranted. Rec small, frequent meals and use of oral supplements.

## 2023-08-03 NOTE — PROGRESS NOTES
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 He was hypotensive in ED so started on dopamine in addition to dobutamine.     Today's Plan:  - continue dobutamine at 5  - continue bumex 2 mg/hr, plus bumex 4 mg IV bolus in morning and 1000 mg IV diuril in afternoon  - update wife Chapis       # Acute on chronic biventricular systolic heart failure secondary to NICM (LVEF 15-20% per TTE 9/2022)  # s/p HM3 LVAD (2/18/20, DT) c/b late RV failure  # Inotrope dependence/cardiogenic shock  # Troponinemia, demand ischemia due to HF  Stage D, NYHA Class IV. Multiple hospitalizations for decompensated RV failure.   RHC 6/21/23 RA 15, mPA 26, PCW 16, CI 2.57 at LVAD speed 5800rpm.  VAD speed increased to 5900 rpm. He was started on IV dobutamine 2.5 mcg/kg/min and discharged home on this. Palliative care was consulted, plan is to continue aggressive medical management.      Patient presented with ~20 lb weight gain (199 lb, EDW ~180 lb), elevated NT pro BNP, BERNARDA on CKD, hyponatremia all c/w hypervolemia due to severe BiV heart failure. Trop mildly elevated, per baseline, due to demand ischemia with HF. Developed hypotension in the ED MAP mid 50s so briefly started on dopamine 2.5 mcg/kg/min.      - Fluid status: hypervolemic, Bumex gtt 2mg/hr, with PRN diuril 1000 mg IV and PRN IV bumex bolus  HOLD hydrochlorothiazide 75 mg daily for hypotension, hold PTA torsemide 100 mg TID  - RV support/inotropy: dobutamine 5mcg/kg/min increased 7/28 (briefly on dopamine for  hypotension)  - ACEi/ARB/ARNi: deferred due to renal dysfunction  - Afterload reduction: holding PTA hydrochlorothiazide 75 mg TID, resume closer to discharge   - BB:  contraindicated due to RV dysfunction  - Aldosterone antagonist: contraindicated due to renal dysfunction  - SGLT2i: Jardiance d/c'ed due to renal dysfunction  - SCD ppx:  ICD  - Antiplatelet: not on PTA, due to past epistaxis  - Anticoagulation: warfarin goal INR 1.7-2.3 (due to epistaxis). INR today 2.17  - MAPs:  70s-80s  - LDH:  baseline 300s -> 435 on admission, 362 most recently  - MEMS: CardioMEMS 26 7/30 and 8/2 (baseline ~22), check intermittently  - GOC: prior conversations patient and wife consistent with restorative/aggressive GOC, continue to discuss given progression of disease     # Chronic LVAD drive line infection, MSSA and PSA  Follows with LVAD ID. On outpatient IV cefepime. Infection appears to be well-controlled currently.WBC 11.7 on admissions but per wife, site looks better than it has in awhile.  -continue pta IV cefepime q24hr, renally dose 1 g     # Leukocytosis, mild, resolved  WBC 11.7 on admission.  No signs of infection, afebrile. recurrent issue during prior admissions. WBC now normal.   - daily CBC for now  - low threshold to culture and broaded abx     # BERNARDA on CKD, cardiorenal  Cr 3.4 on admission, baseline is mid 2s. BERNARDA econdary to CRS/hypervolemia. Briefly improved with diuresis  - q12hr BMP  - diuresis as above  - dobutamine as above     # NSVT  - continue pta amiodarone 200mg daily  - aim for K>4 Mg>2.      # Hypervolemic hyponatremia, chronic, improved  Na 126 on admission, hypervolemic hyponatremia in setting of HF. Anticipate will improve with diuresis,  - q12h BMP     # Liver cirrhosis with portal hypertension  # Ascites   Liver biopsy during recent admission revealed cirrhosis - likely multifactorial (remote EtOH misuse, NAFLD, chronic congestive hepatopathy from RV failure).  No evidence of decompensation.  "CT A/P (5/20) showed stable small volume ascites and mild mesenteric/retroperitoneal edema/cholelithiasis. Hepatology consulted 5/30, no concern for HE though at risk for. AST 64 and ALT wnl on admit.   - goal 2 BM daily, scheduled miralax hold for loose stools  - intermittent  LFTs  - CT a/p with mild to moderate ascites, cholelithiasis with non distended gallbladder (LFTs wnl), continue with diuresis as above     # BEATRIZ  Worsening anxiety lately per Chapis, wife. Previously on paxil but was weaned off in May (when he had ecephalopthy) and started on seroquel. Wife requesting an alterative and patient agreeable.  - increased seroquel 25 mg at HS then decreased back to 12.5 mg per patient made him drowsy  - prn atrax  for itching, anxiety  - patient requesting to resume paxil, started 10 mg on 7/29, titrate as outpatient         OTHER/CHRONIC CONDITIONS:   Hypothyroidism:  7/27 TSH 7.5 free T4 1.4, PTA levothyroxine 100mcg qAM.   GERD:  PTA pantoprazole   Normocytic anemia/BELA:  Baseline 8-9, stable. PTA ferrous sulfate 325mg daily.   BPH:  PTA finasteride 5mg daily and tamsulosin 0.8mg qPM.  DMII:  A1c 6.3 , LDSSI, hypoglycemia protocol while inpatient, diet controlled as outpatient   Gout:  pta allopurinol 200mg daily  h/o HIT  ABHINAV:  pta CPAP     ================================================================    Subjective/24-Hr Events:   Last 24 hr care team notes reviewed. No overnight events. Abdominal pain improving, although not completely resolved. CT a/p w/o evidence of acute abdomen, mild to mod ascites. Denies CP, SOB, palpitations, lightheadedness, orthopnea, PND. Appetite improved. No N/V, diarrhea. + BM.     ROS:  4 point ROS including respiratory, CV, GI and  (other than that noted in the HPI) is negative.     Medications: Reviewed in EPIC.     Physical Exam:   BP (!) 82/60   Pulse 107   Temp 98.2  F (36.8  C) (Oral)   Resp 16   Ht 1.702 m (5' 7\")   Wt 86 kg (189 lb 9.6 oz)   SpO2 97%   BMI " 29.70 kg/m      GENERAL: Appears comfortable, in no distress.  HEENT: Eye symmetrical, no discharge or icterus bilaterally. Mucous membranes moist and without lesions.  NECK: Supple, JVD at mandible at 45 degrees  CV: tachycardic, + LVAD hum  RESPIRATORY: Respirations regular, even, and unlabored. Lungs CTA throughout.    GI: Soft and obese with increased distention, with normoactive bowel sounds present in all quadrants. Mild RLQ tenderness, no guarding   EXTREMITIES: Trace BLE peripheral edema. 2+ bilateral pedal pulses.   NEUROLOGIC: Alert and oriented x 3. No focal deficits.   MUSCULOSKELETAL: No joint swelling or tenderness.   SKIN: No jaundice. No rashes or lesions.     Labs:  CMP  Recent Labs   Lab 08/03/23  1308 08/03/23  1127 08/03/23  0844 08/03/23  0510 08/02/23  2138 08/02/23  1806 08/02/23  0824 08/02/23  0601 08/01/23  2148 08/01/23  1749   NA  --   --   --  136  --  135*  --  134*  --  132*   POTASSIUM  --  3.7  --  3.2*  --  5.0  --  4.5  --  5.0   CHLORIDE  --   --   --  100  --  101  --  100  --  98   CO2  --   --   --  22  --  20*  --  20*  --  20*   ANIONGAP  --   --   --  14  --  14  --  14  --  14   *  --  218* 132* 119* 174*   < > 153*   < > 155*   BUN  --   --   --  91.2*  --  91.9*  --  93.7*  --  89.4*   CR  --   --   --  3.43*  --  3.34*  --  3.33*  --  3.31*   GFRESTIMATED  --   --   --  18*  --  19*  --  19*  --  19*   CALOS  --   --   --  8.6*  --  8.8  --  8.6*  --  8.7*   MAG  --   --   --  1.8  --  1.9  --  1.9  --  1.9   PROTTOTAL  --   --   --  7.0  --  7.4  --   --   --   --    ALBUMIN  --   --   --  3.5  --  3.7  --   --   --   --    BILITOTAL  --   --   --  0.3  --  0.4  --   --   --   --    ALKPHOS  --   --   --  99  --  102  --   --   --   --    AST  --   --   --  50*  --  54*  --   --   --   --    ALT  --   --   --  34  --  36  --   --   --   --     < > = values in this interval not displayed.       CBC  Recent Labs   Lab 08/03/23  0510 08/02/23  0601 08/01/23  0426  07/31/23  0453   WBC 8.7 9.7 8.4 9.5   RBC 3.06* 3.20* 3.05* 3.15*   HGB 8.2* 8.6* 8.2* 8.3*   HCT 27.2* 28.2* 27.4* 28.3*   MCV 89 88 90 90   MCH 26.8 26.9 26.9 26.3*   MCHC 30.1* 30.5* 29.9* 29.3*   RDW 19.4* 19.4* 19.5* 19.3*    218 220 253       INR  Recent Labs   Lab 08/03/23  0510 08/02/23  0601 08/01/23  0426 07/31/23  0453   INR 2.17* 2.48* 2.53* 2.46*       Time/Communication  I personally spent a total of 40 minutes. Of that 15 minutes was counseling/coordination of patient's care. Plan of care discussed with patient. See my note above for details.    Patient discussed with Dr. Cisneros.        Francoise Peters, CORY, NP-C  Advanced Heart Failure/Cardiology II Service  Pager [phone] [facility] [phone]

## 2023-08-03 NOTE — PROCEDURES
The patient's HeartMate 3 LVAD was interrogated 8/3/2023  * Speed 5900 rpm   * Pulsatility index 3.6-3.6   * Power 4.8 Driver   * Flow 5.1-5.2 L/minute   Fluid status: hypervolemic   Alarms were reviewed. Not notable for PI events, power spikes, speed drops, or other findings suspicious of pump malfunction noted.   The driveline exit site was inspected, CDI.   All external components were inspected and showed no evidence of damage or malfunction, none replaced.   No changes to VAD settings made

## 2023-08-04 NOTE — PLAN OF CARE
Monitored vitals and assessed pt status.   LDA:  -R DL PICC  -L PIV x1  Running:   -Bumex gtt @ 2 mg/hr (8 mL/hr)  -Dobutamine gtt @ 5 mcg/kg/min (12.7 mL/hr)  Tele: ST ('s)  O2: sats 94-99% on RA  Mobility: A1 + walker    A0x4. [place] baseline, has hearing aides. VSS. Afebrile. Doppler MAP's 80-90. LVAD numbers WNL, no alarms this shift.  Pt says he has been having tremors for awhile and would like team to address this. Denies pain, CP, palpitations, SOB, HA, dizziness or lightheadedness, or N/T. 2g Na diet with 1.8L FR. ACHS POCT. No nausea. Urinating adequately via urinal, voids spontaneously. BM x2 this shift on bedside commode. No new skin deficits noted. Driveline dressing CDI.     Continue to monitor Pt status and report changes to Cards 2.    Shift [id number]

## 2023-08-04 NOTE — PROGRESS NOTES
The patient's HeartMate LVAD was interrogated 8/4/2023  * Speed 5900 rpm   * Pulsatility index 3.33   * Power 4,5 Driver   * Flow 5.0 L/minute   Fluid status: hypervolemic   Alarms were reviewed, and notable for occasional pi events .   The driveline exit site was inspected, c/d/i.   All external components were inspected and showed no evidence of damage or malfunction, none replaced.   No changes to VAD settings made

## 2023-08-04 NOTE — PROGRESS NOTES
Care Management Follow Up    Length of Stay (days): 7    Expected Discharge Date: 08/04/2023     Concerns to be Addressed:  Coping, adjustment to illness      Patient plan of care discussed at interdisciplinary rounds: Yes    Anticipated Discharge Disposition: Home Infusion, Outpatient Infusion Services     Anticipated Discharge Services:  Resume outpatient infusion services   Anticipated Discharge DME:  None     Patient/family educated on Medicare website which has current facility and service quality ratings:  N/a   Education Provided on the Discharge Plan:  Yes   Patient/Family in Agreement with the Plan:  Yes     Referrals Placed by CM/SW:    [facility] working with pt's outpatient infusion center to resume services.   Private pay costs discussed: Not applicable    Additional Information:  LVAD  provided supportive visit. Discussed case with "[employer] LISA. Pt had anticipated discharge today/tomorrow and is aware that at the earliest discharge may happen would be Monday. Pt understandably disappointed but understands need to remain in the hospital for further diuresis. Anticipate pt's wife will provide transportation at discharge, but she is trying to manage this with her work. Pt reports his wife is coming tomorrow to visit.     Team has had many goals of care conversations with pt and his wife. Goal at this time is to get pt to his dtr's wedding at the end of September.     LVAD  to continue to follow.       VERÓNICA ChavesSW

## 2023-08-04 NOTE — PROGRESS NOTES
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 He was hypotensive in ED so started on dopamine in addition to dobutamine.     Today's Plan:  - Continue dobutamine at 5  - Continue bumex 2 mg/hr, plus 1000 mg IV diuril this AM, may repeat this afternoon  - Daily Cardiomems reading    # Acute on chronic biventricular systolic heart failure secondary to NICM (LVEF 15-20% per TTE 9/2022)  # s/p HM3 LVAD (2/18/20, DT) c/b late RV failure  # Inotrope dependence/cardiogenic shock  # Troponinemia, demand ischemia due to HF  Stage D, NYHA Class IV. Multiple hospitalizations for decompensated RV failure.   RHC 6/21/23 RA 15, mPA 26, PCW 16, CI 2.57 at LVAD speed 5800rpm.  VAD speed increased to 5900 rpm. He was started on IV dobutamine 2.5 mcg/kg/min and discharged home on this. Palliative care was consulted, plan is to continue aggressive medical management.      Patient presented with ~20 lb weight gain (199 lb, EDW ~180 lb), elevated NT pro BNP, BERNARDA on CKD, hyponatremia all c/w hypervolemia due to severe BiV heart failure. Trop mildly elevated, per baseline, due to demand ischemia with HF. Developed hypotension in the ED MAP mid 50s so briefly started on dopamine 2.5 mcg/kg/min.      - Fluid status: hypervolemic, Bumex gtt 2mg/hr, with PRN diuril 1000 mg IV and PRN IV bumex bolus  HOLD hydrochlorothiazide 75 mg daily for hypotension, hold PTA torsemide 100 mg TID  - RV support/inotropy: dobutamine 5mcg/kg/min increased 7/28 (briefly on dopamine for  hypotension)  - ACEi/ARB/ARNi: deferred due to renal dysfunction  - Afterload reduction: holding PTA hydrochlorothiazide 75 mg TID, resume closer to discharge   - BB:  contraindicated due to RV dysfunction  - Aldosterone antagonist: contraindicated due to renal dysfunction  - SGLT2i: Jardiance d/c'ed due to renal dysfunction  - SCD ppx:  ICD  - Antiplatelet: not on PTA, due to past epistaxis  - Anticoagulation: warfarin goal INR 1.7-2.3 (due to epistaxis). INR today 2.92  - MAPs:  70s-80s  - LDH:  baseline 300s -> 435 on admission, 362 most recently  - MEMS: CardioMEMS 26 7/30 and 8/2 (baseline ~22), check daily  - GOC: prior conversations patient and wife consistent with restorative/aggressive GOC, continue to discuss given progression of disease     # Chronic LVAD drive line infection, MSSA and PSA  Follows with LVAD ID. On outpatient IV cefepime. Infection appears to be well-controlled currently.WBC 11.7 on admissions but per wife, site looks better than it has in awhile.  - Continue pta IV cefepime q24hr, renally dose 1 g     # BERNARDA on CKD, cardiorenal  Cr 3.4 on admission, baseline is mid 2s. BERNARDA secondary to CRS/hypervolemia. Briefly improved with diuresis, but now back to mid 3s  - q12hr BMP  - diuresis as above  - dobutamine as above     # NSVT  - continue pta amiodarone 200mg daily  - aim for K>4 Mg>2.      # Hypervolemic hyponatremia, chronic, improved  Na 126 on admission, hypervolemic hyponatremia in setting of HF. Anticipate will improve with diuresis,  - q12h BMP     # Liver cirrhosis with portal hypertension  # Ascites   Liver biopsy during recent admission revealed cirrhosis - likely multifactorial (remote EtOH misuse, NAFLD, chronic congestive hepatopathy from RV failure).  No evidence of decompensation. CT A/P (5/20) showed stable small volume ascites and mild mesenteric/retroperitoneal edema/cholelithiasis. Hepatology consulted 5/30, no concern for HE though at risk for. AST 64 and ALT wnl on  "admit.   - goal 2 BM daily, scheduled miralax hold for loose stools  - intermittent  LFTs  - CT a/p with mild to moderate ascites, cholelithiasis with non distended gallbladder (LFTs wnl), continue with diuresis as above     # BEATRIZ  Worsening anxiety lately per Chapsi, wife. Previously on paxil but was weaned off in May (when he had ecephalopthy) and started on seroquel. Wife requesting an alterative and patient agreeable.  - increased seroquel 25 mg at HS then decreased back to 12.5 mg per patient made him drowsy  - prn atrax  for itching, anxiety  - patient requesting to resume paxil, started 10 mg on 7/29, titrate as outpatient         OTHER/CHRONIC CONDITIONS:   Hypothyroidism:  7/27 TSH 7.5 free T4 1.4, PTA levothyroxine 100mcg qAM.   GERD:  PTA pantoprazole   Normocytic anemia/BELA:  Baseline 8-9, stable. PTA ferrous sulfate 325mg daily.   BPH:  PTA finasteride 5mg daily and tamsulosin 0.8mg qPM.  DMII:  A1c 6.3 , LDSSI, hypoglycemia protocol while inpatient, diet controlled as outpatient   Gout:  pta allopurinol 200mg daily  h/o HIT  ABHINAV:  pta CPAP     ================================================================    Subjective/24-Hr Events:   Last 24 hr care team notes reviewed. No overnight events. Abdominal pain improving, although not completely resolved.  He notes that it gets better with passing gas and having bowel movements. Not changing locations or getting worse. His appetie is low but no nausea or vomotting. Denies CP, SOB, palpitations, lightheadedness, orthopnea, PND. Appetite improved. No N/V, diarrhea. + BM.     ROS:  4 point ROS including respiratory, CV, GI and  (other than that noted in the HPI) is negative.     Medications: Reviewed in EPIC.     Physical Exam:   BP (!) 82/60   Pulse 105   Temp 97.6  F (36.4  C) (Oral)   Resp 18   Ht 1.702 m (5' 7\")   Wt 87.3 kg (192 lb 6.4 oz)   SpO2 99%   BMI 30.13 kg/m      GENERAL: Appears comfortable, in no distress.  HEENT: Eye symmetrical, no " discharge or icterus bilaterally. Mucous membranes moist and without lesions.  NECK: Supple, JVD at mandible at 45 degrees  CV: tachycardic, + LVAD hum  RESPIRATORY: Respirations regular, even, and unlabored. Lungs CTA throughout.    GI: Soft and obese with increased distention, with normoactive bowel sounds present in all quadrants. Mild RLQ tenderness, no guarding   EXTREMITIES: Trace BLE peripheral edema.   NEUROLOGIC: Alert and interacting appropriatly. No focal deficits.   MUSCULOSKELETAL: No joint swelling or tenderness.   SKIN: No jaundice. No rashes or lesions.     Labs:  CMP  Recent Labs   Lab 08/04/23  1237 08/04/23  0752 08/04/23  0456 08/03/23  2134 08/03/23  1836 08/03/23  1308 08/03/23  1127 08/03/23  0844 08/03/23  0510 08/02/23  2138 08/02/23  1806   NA  --   --  135*  --  133*  --   --   --  136  --  135*   POTASSIUM  --   --  3.9  --  4.5  --  3.7  --  3.2*  --  5.0   CHLORIDE  --   --  100  --  98  --   --   --  100  --  101   CO2  --   --  20*  --  20*  --   --   --  22  --  20*   ANIONGAP  --   --  15  --  15  --   --   --  14  --  14   * 142* 145* 169* 170*   < >  --    < > 132*   < > 174*   BUN  --   --  89.7*  --  86.4*  --   --   --  91.2*  --  91.9*   CR  --   --  3.53*  --  3.43*  --   --   --  3.43*  --  3.34*   GFRESTIMATED  --   --  18*  --  18*  --   --   --  18*  --  19*   CALOS  --   --  8.4*  --  8.4*  --   --   --  8.6*  --  8.8   MAG  --   --  1.9  --  1.9  --   --   --  1.8  --  1.9   PROTTOTAL  --   --   --   --   --   --   --   --  7.0  --  7.4   ALBUMIN  --   --   --   --   --   --   --   --  3.5  --  3.7   BILITOTAL  --   --   --   --   --   --   --   --  0.3  --  0.4   ALKPHOS  --   --   --   --   --   --   --   --  99  --  102   AST  --   --   --   --   --   --   --   --  50*  --  54*   ALT  --   --   --   --   --   --   --   --  34  --  36    < > = values in this interval not displayed.       CBC  Recent Labs   Lab 08/04/23  0456 08/03/23  0510 08/02/23  0601  08/01/23  0426   WBC 10.1 8.7 9.7 8.4   RBC 3.18* 3.06* 3.20* 3.05*   HGB 8.5* 8.2* 8.6* 8.2*   HCT 28.4* 27.2* 28.2* 27.4*   MCV 89 89 88 90   MCH 26.7 26.8 26.9 26.9   MCHC 29.9* 30.1* 30.5* 29.9*   RDW 19.4* 19.4* 19.4* 19.5*    205 218 220       INR  Recent Labs   Lab 08/04/23  0456 08/03/23  0510 08/02/23  0601 08/01/23  0426   INR 2.92* 2.17* 2.48* 2.53*       Time/Communication  I personally spent a total of 40 minutes. Of that 15 minutes was counseling/coordination of patient's care. Plan of care discussed with patient. See my note above for details.    Patient discussed with Dr. Nato Decker PA-C  Advanced Heart Failure/Cardiology II Service  Pager [phone] [facility] [phone]

## 2023-08-05 NOTE — PLAN OF CARE
Temp: 98  F (36.7  C) Temp src: Oral  Pulse: 107   Resp: 18 SpO2: 97 % O2 Device: None (Room air)       Diagnosis: SOB, fatigue, weight gain, hypervolemia due to end-stage RV failure  History of NICM, HFrEF s/p HM3 LVAD (2020), severe RV failure, cirrhosis stage IV, chronic driveline infection, T2DM, CKD stage III, HTN, HLD, ABHINAV, gout, GERD, anxiety    Neuro: A&Ox 4, denied headache or dizziness  Cardiac: Tele in place, ST. VAD numbers WNL.  Respiratory: O2 sating >97% on RA.  Vitals: Doppler MAPs 68-78, afebrile, denied pain  GI/: Denied nausea, 2 LBM overnight, voiding to bedside urinal with 1650 mL urinary output during shift.  Skin: Left PIV in place, SL. Right double PICC in place, red lumen infusing bumex, purple lumen infusing dobutamine.   Mobility: Up SBA with walker.   Rest: Slept poorly overnight.    Running: bumex @ 2 mg/hr, dobutamine @ 5 mcg/kg/min    P: Continue to monitor and follow POC. Notify CVTS with changes.      [id number]

## 2023-08-05 NOTE — PROGRESS NOTES
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 He was hypotensive in ED so started on dopamine in addition to dobutamine.     Today's Plan:  - Continue dobutamine at 5  - Continue bumex 2 mg/hr, plus 1000 mg IV  BID today if electrolytes allow  - Diamox 500 mg BID today, consider increasing to TID tomorrow  - Daily Cardiomems reading  - Wife, Chapis, updated at 12:30 pm 8/5/23    # Acute on chronic biventricular systolic heart failure secondary to NICM (LVEF 15-20% per TTE 9/2022)  # s/p HM3 LVAD (2/18/20, DT) c/b late RV failure  # Inotrope dependence/cardiogenic shock  # Troponinemia, demand ischemia due to HF  Stage D, NYHA Class IV. Multiple hospitalizations for decompensated RV failure.   RHC 6/21/23 RA 15, mPA 26, PCW 16, CI 2.57 at LVAD speed 5800rpm.  VAD speed increased to 5900 rpm. He was started on IV dobutamine 2.5 mcg/kg/min and discharged home on this. Palliative care was consulted, plan is to continue aggressive medical management.      Patient presented with ~20 lb weight gain (199 lb, EDW ~180 lb), elevated NT pro BNP, BERNARDA on CKD, hyponatremia all c/w hypervolemia due to severe BiV heart failure. Trop mildly elevated, per baseline, due to demand ischemia with HF. Developed hypotension in the ED MAP mid 50s so briefly started on dopamine 2.5 mcg/kg/min.      - Fluid status: hypervolemic, Bumex gtt 2mg/hr, with diuril and diamox as above  - RV support/inotropy: dobutamine 5mcg/kg/min increased 7/28 (briefly on dopamine for  hypotension)  - ACEi/ARB/ARNi: deferred due to renal dysfunction  - Afterload reduction: holding PTA hydrochlorothiazide 75 mg TID, resume closer to discharge   - BB:  contraindicated due to RV dysfunction  - Aldosterone antagonist: contraindicated due to renal dysfunction  - SGLT2i: Jardiance d/c'ed due to renal dysfunction  - SCD ppx:  ICD  - Antiplatelet: not on PTA, due to past epistaxis  - Anticoagulation: warfarin goal INR 1.7-2.3 (due to epistaxis). INR today 2.07  - MAPs:  70s-80s  - LDH:  baseline 300s -> 435 on admission, 362 most recently  - MEMS: CardioMEMS 26 7/30 and 8/2 (baseline ~22), check daily  - GOC: prior conversations patient and wife consistent with restorative/aggressive GOC, continue to discuss given progression of disease     # Chronic LVAD drive line infection, MSSA and PSA  Follows with LVAD ID. On outpatient IV cefepime. Infection appears to be well-controlled currently.WBC 11.7 on admissions but per wife, site looks better than it has in awhile.  - Continue pta IV cefepime q24hr, renally dose 1 g     # BERNARDA on CKD, cardiorenal  Cr 3.4 on admission, baseline is mid 2s. BERNARDA secondary to CRS/hypervolemia. Briefly improved with diuresis, but now back to mid 3s  - q12hr BMP  - diuresis as above  - dobutamine as above     # NSVT  - continue pta amiodarone 200mg daily  - aim for K>4 Mg>2.      # Hypervolemic hyponatremia, chronic, improved  Na 126 on admission, hypervolemic hyponatremia in setting of HF. Anticipate will improve with diuresis,  - q12h BMP     # Liver cirrhosis with portal hypertension  # Ascites   Liver biopsy during recent admission revealed cirrhosis - likely multifactorial (remote EtOH misuse, NAFLD, chronic congestive hepatopathy from RV failure).  No evidence of decompensation. CT A/P (5/20) showed stable small volume ascites and mild mesenteric/retroperitoneal edema/cholelithiasis. Hepatology consulted 5/30, no concern for HE though at risk for. AST 64 and ALT wnl on  admit. He does have abdominal pain, but improving with admission and related to needing to pass gas and have Bms. Not c/w CBP, especially given it has been ongoing for >1week. Defering diagnostic paracentesis currently given risks vs benefits.  - goal 2 BM daily, scheduled miralax hold for loose stools  - intermittent  LFTs  - CT a/p with mild to moderate ascites, cholelithiasis with non distended gallbladder (LFTs wnl), continue with diuresis as above     # BEATRIZ  Worsening anxiety lately per Chapis, wife. Previously on paxil but was weaned off in May (when he had ecephalopthy) and started on seroquel. Wife requesting an alterative and patient agreeable.  - increased seroquel 25 mg at HS then decreased back to 12.5 mg per patient made him drowsy  - prn atrax  for itching, anxiety  - patient requesting to resume paxil, started 10 mg on 7/29, titrate as outpatient         OTHER/CHRONIC CONDITIONS:   Hypothyroidism:  7/27 TSH 7.5 free T4 1.4, PTA levothyroxine 100mcg qAM.   GERD:  PTA pantoprazole   Normocytic anemia/BELA:  Baseline 8-9, stable. PTA ferrous sulfate 325mg daily.   BPH:  PTA finasteride 5mg daily and tamsulosin 0.8mg qPM.  DMII:  A1c 6.3 , LDSSI, hypoglycemia protocol while inpatient, diet controlled as outpatient   Gout:  pta allopurinol 200mg daily  h/o HIT  ABHINAV:  pta CPAP     ================================================================    Subjective/24-Hr Events:   Last 24 hr care team notes reviewed. No overnight events. Abdominal pain improving and almost resolved. He notes that it gets better with passing gas and having bowel movements. No nausea or vomotting. Denies CP, SOB, palpitations, lightheadedness, orthopnea, PND. Appetite improved. No N/V, diarrhea. + BM. He still feels like he has extra fluid.    ROS:  4 point ROS including respiratory, CV, GI and  (other than that noted in the HPI) is negative.     Medications: Reviewed in EPIC.     Physical Exam:   BP (!) 82/60   Pulse 104   Temp  "98.7  F (37.1  C) (Oral)   Resp 18   Ht 1.702 m (5' 7\")   Wt 86.9 kg (191 lb 9.6 oz)   SpO2 100%   BMI 30.01 kg/m      GENERAL: Appears comfortable, in no distress.  HEENT: Eye symmetrical, no discharge or icterus bilaterally.   NECK: Supple, JVD at mandible at 45 degrees  CV: tachycardic, + LVAD hum  RESPIRATORY: Respirations regular, even, and unlabored. Lungs CTA throughout.    GI: Soft and obese with increased distention, with normoactive bowel sounds present in all quadrants. Mild RLQ tenderness, no guarding   EXTREMITIES: Trace BLE peripheral edema.   NEUROLOGIC: Alert and interacting appropriatly. No focal deficits.   MUSCULOSKELETAL: No joint swelling or tenderness.   SKIN: No jaundice. No rashes or lesions.     Labs:  CMP  Recent Labs   Lab 08/05/23  0918 08/05/23  0511 08/04/23  2205 08/04/23  2111 08/04/23  1807 08/04/23  1802 08/04/23  0752 08/04/23  0456 08/03/23  2134 08/03/23  1836 08/03/23  0844 08/03/23  0510 08/02/23  2138 08/02/23  1806   NA  --  135*  --   --   --  131*  --  135*  --  133*  --  136  --  135*   POTASSIUM  --  3.5  --   --   --  4.1  --  3.9  --  4.5   < > 3.2*  --  5.0   CHLORIDE  --  99  --   --   --  98  --  100  --  98  --  100  --  101   CO2  --  19*  --   --   --  20*  --  20*  --  20*  --  22  --  20*   ANIONGAP  --  17*  --   --   --  13  --  15  --  15  --  14  --  14   * 146* 167* 169*   < > 263*   < > 145*   < > 170*   < > 132*   < > 174*   BUN  --  88.1*  --   --   --  86.9*  --  89.7*  --  86.4*  --  91.2*  --  91.9*   CR  --  3.46*  --   --   --  3.47*  --  3.53*  --  3.43*  --  3.43*  --  3.34*   GFRESTIMATED  --  18*  --   --   --  18*  --  18*  --  18*  --  18*  --  19*   CALOS  --  8.6*  --   --   --  8.4*  --  8.4*  --  8.4*  --  8.6*  --  8.8   MAG  --  1.8  --   --   --  1.8  --  1.9  --  1.9  --  1.8  --  1.9   PROTTOTAL  --   --   --   --   --   --   --   --   --   --   --  7.0  --  7.4   ALBUMIN  --   --   --   --   --   --   --   --   --   --   -- "  3.5  --  3.7   BILITOTAL  --   --   --   --   --   --   --   --   --   --   --  0.3  --  0.4   ALKPHOS  --   --   --   --   --   --   --   --   --   --   --  99  --  102   AST  --   --   --   --   --   --   --   --   --   --   --  50*  --  54*   ALT  --   --   --   --   --   --   --   --   --   --   --  34  --  36    < > = values in this interval not displayed.       CBC  Recent Labs   Lab 08/05/23 0511 08/04/23  0456 08/03/23  0510 08/02/23  0601   WBC 8.5 10.1 8.7 9.7   RBC 3.29* 3.18* 3.06* 3.20*   HGB 8.8* 8.5* 8.2* 8.6*   HCT 28.6* 28.4* 27.2* 28.2*   MCV 87 89 89 88   MCH 26.7 26.7 26.8 26.9   MCHC 30.8* 29.9* 30.1* 30.5*   RDW 19.6* 19.4* 19.4* 19.4*    217 205 218       INR  Recent Labs   Lab 08/05/23 0511 08/04/23 0456 08/03/23  0510 08/02/23  0601   INR 2.07* 2.92* 2.17* 2.48*       Time/Communication  I personally spent a total of 40 minutes. Of that 15 minutes was counseling/coordination of patient's care. Plan of care discussed with patient. See my note above for details.    Patient discussed with Dr. Nato Decker PA-C  Advanced Heart Failure/Cardiology II Service  Pager [phone] [facility] [id number]

## 2023-08-05 NOTE — PLAN OF CARE
Monitored vitals and assessed pt status.   LDA:  -R DL PICC  -L PIV x1  Running:   -Bumex gtt @ 2 mg/hr (8 mL/hr)  -Dobutamine gtt @ 5 mcg/kg/min (12.7 mL/hr)  Tele: ST ('s)  O2: sats 94-99% on RA  Mobility: A1 + walker    A0x4. VSS. ST. Unit collect. Denies pain.  [place]. VAD #s WDL. VAD dressing changed. Continues with intermittent tremors. BM x 1. 1.8 L FR.   Continue to monitor Pt status and report changes to Cards 2 possible RHC prior to discharge.

## 2023-08-05 NOTE — PLAN OF CARE
Monitored vitals and assessed pt status.   LDA:  -R DL PICC  -L PIV x1  Running:   -Bumex gtt @ 2 mg/hr (8 mL/hr)  -Dobutamine gtt @ 5 mcg/kg/min (12.7 mL/hr)  Tele: ST ('s)  O2: sats 94-99% on RA  Mobility: A1 + walker    A0x4. VSS. ST. Mild A x 1 using walker. Unit collect. Denies pain.  [place]. VAD #s WDL no alarms.  VAD dressing changed. . BM x 1. 1.8 L FR. Gave Diuril + Diomax. 5 beats poly vtach reported. Mag replaced with 2mg IV. Pleasant and cooperative with cares.  Continue to monitor Pt status and report changes to Cards 2 possible RHC prior to discharge.

## 2023-08-05 NOTE — PROGRESS NOTES
The patient's HeartMate LVAD was interrogated 8/5/2023  * Speed 5900 rpm   * Pulsatility index 3.8   * Power 4.9 Driver   * Flow 5.1 L/minute   Fluid status: hypervolemic   Alarms were reviewed, and notable for rare pi events, no alarms.   The driveline exit site was inspected, c/d/i.   All external components were inspected and showed no evidence of damage or malfunction, none replaced.   No changes to VAD settings made

## 2023-08-06 NOTE — PROGRESS NOTES
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 He also notes 20 lb weight gain since last hospital discharge for heart failure on 6/29/23.     Neuro: A/O x4.  Call light appropriate.  Able to make needs known. Pleasant, but requested not to be bothered during the night if possible.  Respiratory:  On room air.  Lung sounds clear.  Denies shortness of breath at rest.   Cardiac: VSS. ST. No c/o chest pain/palpitations. LVAD HM3 numbers WNL. No alarms.  GI/: No BM this shift.  No report of N/V. Voiding adequate clear, yellow urine. 1.8 L FR.  Endo:  Blood sugars ACHS.  Skin:  No new deficits. See flowsheets.  LDA:  R DL PICC running dobutamine @ 5mcg/kg/min & bumex @ 2 mg/hr. PIV - SL  Pain: Denies  Activity: Ax1     Plan: Continue POC and report changes to Cards 2.  Possible RHC prior to discharge.

## 2023-08-06 NOTE — PROGRESS NOTES
D: input 480cc/ output 900cc  A:pt has not had any response since second dose of Diamox  P:continue to monitor output

## 2023-08-06 NOTE — PLAN OF CARE
Monitored vitals and assessed pt status.   LDA:  -R DL PICC  -L PIV x1  Running:   -Bumex gtt @ 2 mg/hr (8 mL/hr)  -Dobutamine gtt @ 5 mcg/kg/min (12.7 mL/hr)  Tele: ST ('s)  O2: sats 94-99% on RA  Mobility: A1 + walker    A0x4. VSS. ST. Unit collect. Denies pain.  [place]. VAD #s WDL. VAD dressing changed. BM x 2 watery: CDIFF + today. Started on oral vancomycin.  Patient fatigued today. Walked to bathroom with OT x 1. Need to encourage frequent ambulation. Good appetite.   Continue to monitor Pt status and report changes to Cards 2 possible RHC prior to discharge.

## 2023-08-06 NOTE — PROGRESS NOTES
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 He was hypotensive in ED so started on dopamine in addition to dobutamine.     Today's Plan:  - Continue dobutamine at 5 mcg/kg/hr  - Continue bumex 2 mg/hr, plus 1000 mg IV  BID today if electrolytes allow  - Diamox 500 mg RID today, consider increasing to TID tomorrow  - Daily Cardiomems reading  - Wife, Chapis, updated at 10:30 am  - Starting PO vancomycin for c. Diff    # Acute on chronic biventricular systolic heart failure secondary to NICM (LVEF 15-20% per TTE 9/2022)  # s/p HM3 LVAD (2/18/20, DT) c/b late RV failure  # Inotrope dependence/cardiogenic shock  # Troponinemia, demand ischemia due to HF  Stage D, NYHA Class IV. Multiple hospitalizations for decompensated RV failure.   RHC 6/21/23 RA 15, mPA 26, PCW 16, CI 2.57 at LVAD speed 5800rpm.  VAD speed increased to 5900 rpm. He was started on IV dobutamine 2.5 mcg/kg/min and discharged home on this. Palliative care was consulted, plan is to continue aggressive medical management.      Patient presented with ~20 lb weight gain (199 lb, EDW ~180 lb), elevated NT pro BNP, BERNARDA on CKD, hyponatremia all c/w hypervolemia due to severe BiV heart failure. Trop mildly elevated, per baseline, due to demand ischemia with HF. Developed hypotension in the ED MAP mid 50s so briefly started on dopamine 2.5 mcg/kg/min.      - Fluid status: hypervolemic, Bumex gtt 2mg/hr, with diuril and diamox as above  - RV support/inotropy: dobutamine 5mcg/kg/min increased  7/28 (briefly on dopamine for hypotension)  - ACEi/ARB/ARNi: deferred due to renal dysfunction  - Afterload reduction: holding PTA hydrochlorothiazide 75 mg TID, resume closer to discharge   - BB:  contraindicated due to RV dysfunction  - Aldosterone antagonist: contraindicated due to renal dysfunction  - SGLT2i: Jardiance d/c'ed due to renal dysfunction  - SCD ppx:  ICD  - Antiplatelet: not on PTA, due to past epistaxis  - Anticoagulation: warfarin goal INR 1.7-2.3 (due to epistaxis). INR today 2.01  - MAPs:  70s-80s  - LDH:  baseline 300s -> 435 on admission, 362 most recently  - MEMS: CardioMEMS 26 7/30 and 8/2 (baseline ~22), requesting daily checks  - GOC: prior conversations patient and wife consistent with restorative/aggressive GOC, continue to discuss given progression of disease     # Chronic LVAD drive line infection, MSSA and PSA  Follows with LVAD ID. On outpatient IV cefepime. Infection appears to be well-controlled currently.WBC 11.7 on admissions but per wife, site looks better than it has in awhile. WBC normalized and now elevated again in the setting of c. Idff.  - Continue pta IV cefepime q24hr, renally dose 1 g  - He does not have antibiotic coverage for home IV antibiotics, so as an outpatient he goes to his local hospital for these daily     # BERNARDA on CKD, cardiorenal  Cr 3.4 on admission, baseline is mid 2s. BERNARDA secondary to CRS/hypervolemia. Briefly improved with diuresis, but now back to mid 3s.  - q12hr BMP  - diuresis as above  - dobutamine as above     # NSVT  - continue pta amiodarone 200mg daily  - aim for K>4 Mg>2.      # Hypervolemic hyponatremia, chronic, resolved  Na 126 on admission, hypervolemic hyponatremia in setting of HF. Anticipate will improve with diuresis,  - q12h BMP     # Liver cirrhosis with portal hypertension  # Ascites   Liver biopsy during recent admission revealed cirrhosis - likely multifactorial (remote EtOH misuse, NAFLD, chronic congestive hepatopathy from RV  failure).  No evidence of decompensation. Hepatology consulted 5/30, no concern for HE though at risk for. A He does have abdominal pain, but improving with admission and related to needing to pass gas and have Bms. Not c/w CBP, especially given it has been ongoing for >1week. Defering diagnostic paracentesis currently given risks vs benefits.   - Goal 2 BM daily, scheduled miralax hold for loose stools  - Intermittent  LFTs     # BEATRIZ  Worsening anxiety lately per Chapis, wife. Previously on paxil but was weaned off in May (when he had ecephalopthy) and started on seroquel. Wife requesting an alterative and patient agreeable.  - increased seroquel 25 mg at HS then decreased back to 12.5 mg per patient made him drowsy  - prn atrax  for itching, anxiety  - patient requesting to resume paxil, started 10 mg on 7/29, titrate as outpatient     # GOC. Prior conversations patient and wife consistent with restorative/aggressive GOC, continue to discuss given progression of disease. Given the lack of progress this admission and Eliseo's overall frustration with his healthcare burden, patient and wife having ongoing discussions. They will let us know if they want to see palliative care when Chapis is here on Tuesday.    # C. Diff. Patient with new diarrhea. Also with leukocytosis. C. Diff positive on 8/6. He had a case of c. Diff about 3-4 months ago,  but doesn't recall infections prior to that.  - ID consult for recurrent c. Diff.  - PO vancomycin 500 mg QID for at least 10 days, final course length per ID  - Will start a probiotic    OTHER/CHRONIC CONDITIONS:   Hypothyroidism:  7/27 TSH 7.5 free T4 1.4, PTA levothyroxine 100mcg qAM.   GERD:  PTA pantoprazole   Normocytic anemia/BELA:  Baseline 8-9, stable. PTA ferrous sulfate 325mg daily.   BPH:  PTA finasteride 5mg daily and tamsulosin 0.8mg qPM.  DMII:  A1c 6.3 , LDSSI, hypoglycemia protocol while inpatient, diet controlled as outpatient   Gout:  pta allopurinol 200mg  "daily  h/o HIT  ABHINAV:  pta CPAP     ================================================================    Subjective/24-Hr Events:   Last 24 hr care team notes reviewed. No overnight events. No nausea or abdominal pain, but stomach \" doesn't feel right\". He is having diarrhea, but he is not sure how much.  Denies CP, SOB, palpitations, lightheadedness, orthopnea, PND. Appetite improved. No N/V, diarrhea. + BM. He still feels like he has extra fluid.    ROS:  4 point ROS including respiratory, CV, GI and  (other than that noted in the HPI) is negative.     Medications: Reviewed in EPIC.     Physical Exam:   BP (!) 82/60   Pulse 107   Temp 97.8  F (36.6  C)   Resp 18   Ht 1.702 m (5' 7\")   Wt 85.8 kg (189 lb 3.2 oz)   SpO2 99%   BMI 29.63 kg/m      GENERAL: Appears comfortable, in no distress.  HEENT: Eye symmetrical, no discharge or icterus bilaterally.   NECK: Supple, JVD at mandible at 45 degrees  CV: tachycardic, + LVAD hum  RESPIRATORY: Respirations regular, even, and unlabored. Lungs CTA throughout.    GI: Soft and obese. Bowel sounds present thoughout. Non-tender to palpation.  EXTREMITIES: Trace BLE peripheral edema.   NEUROLOGIC: Alert and interacting appropriatly. No focal deficits.   MUSCULOSKELETAL: No joint swelling or tenderness.   SKIN: No jaundice. No rashes or lesions.     Labs:  CMP  Recent Labs   Lab 08/06/23  0612 08/05/23  2134 08/05/23  1826 08/05/23  1741 08/05/23  0918 08/05/23  0511 08/04/23  1807 08/04/23  1802 08/03/23  0844 08/03/23  0510 08/02/23  2138 08/02/23  1806     --  133*  --   --  135*  --  131*   < > 136  --  135*   POTASSIUM 4.0  --  4.4  --   --  3.5  --  4.1   < > 3.2*  --  5.0   CHLORIDE 100  --  99  --   --  99  --  98   < > 100  --  101   CO2 19*  --  20*  --   --  19*  --  20*   < > 22  --  20*   ANIONGAP 17*  --  14  --   --  17*  --  13   < > 14  --  14   * 167* 198* 211*   < > 146*   < > 263*   < > 132*   < > 174*   BUN 83.7*  --  87.1*  --   --  " 88.1*  --  86.9*   < > 91.2*  --  91.9*   CR 3.48*  --  3.48*  --   --  3.46*  --  3.47*   < > 3.43*  --  3.34*   GFRESTIMATED 18*  --  18*  --   --  18*  --  18*   < > 18*  --  19*   CALOS 8.5*  --  8.5*  --   --  8.6*  --  8.4*   < > 8.6*  --  8.8   MAG 2.3  --  2.5*  --   --  1.8  --  1.8   < > 1.8  --  1.9   PROTTOTAL  --   --   --   --   --   --   --   --   --  7.0  --  7.4   ALBUMIN  --   --   --   --   --   --   --   --   --  3.5  --  3.7   BILITOTAL  --   --   --   --   --   --   --   --   --  0.3  --  0.4   ALKPHOS  --   --   --   --   --   --   --   --   --  99  --  102   AST  --   --   --   --   --   --   --   --   --  50*  --  54*   ALT  --   --   --   --   --   --   --   --   --  34  --  36    < > = values in this interval not displayed.       CBC  Recent Labs   Lab 08/06/23  0706 08/06/23  0612 08/05/23  0511 08/04/23  0456 08/03/23  0510   WBC  --  11.3* 8.5 10.1 8.7   RBC  --  2.52* 3.29* 3.18* 3.06*   HGB 8.5* 6.5* 8.8* 8.5* 8.2*   HCT  --  22.5* 28.6* 28.4* 27.2*   MCV  --  89 87 89 89   MCH  --  25.8* 26.7 26.7 26.8   MCHC  --  28.9* 30.8* 29.9* 30.1*   RDW  --  19.8* 19.6* 19.4* 19.4*   PLT  --  255 216 217 205       INR  Recent Labs   Lab 08/06/23  0612 08/05/23  0511 08/04/23  0456 08/03/23  0510   INR 2.01* 2.07* 2.92* 2.17*       Time/Communication  I personally spent a total of 40 minutes. Of that 20 minutes was counseling/coordination of patient's care. Plan of care discussed with patient. See my note above for details.    Patient discussed with Dr. Nato Decker PA-C  Advanced Heart Failure/Cardiology II Service  Pager [phone] [facility] [id number]

## 2023-08-06 NOTE — PROGRESS NOTES
The patient's HeartMate LVAD was interrogated 8/6/2023  * Speed 5900 rpm   * Pulsatility index 3.5   * Power 5.1 Driver   * Flow 5.0 L/minute   Fluid status: hypervolemic  Alarms were reviewed, and notable for rare pi events, no alarms.   The driveline exit site was inspected, c/d/i.   All external components were inspected and showed no evidence of damage or malfunction, none replaced.   No changes to VAD settings made

## 2023-08-07 NOTE — PROGRESS NOTES
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 He also notes 20 lb weight gain since last hospital discharge for heart failure on 6/29/23.     Neuro: A/O x4.  Call light appropriate.  Able to make needs known. Pleasant, but requested not to be bothered during the night.  Respiratory:  On room air.  Lung sounds clear.  Denies shortness of breath at rest.   Cardiac: VSS. ST. No c/o chest pain/palpitations. LVAD HM3 numbers WNL. No alarms. MAPS in 80s  GI/: No BM this shift.  No report of N/V. Voiding adequate clear, yellow urine. 1.8 L FR.  Endo:  Blood sugars ACHS.  Skin:  No new deficits. See flowsheets.  LDA:  R DL PICC running dobutamine @ 5mcg/kg/min & bumex @ 2 mg/hr. PIV - SL  Pain: Denies  Activity: Ax1     Plan: Continue POC and report changes to Cards 2.  Possible RHC prior to discharge. Possibility of palliative/hospice consult.

## 2023-08-07 NOTE — PLAN OF CARE
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 He was hypotensive in ED so started on dopamine in addition to dobutamine.     Cardiac: ST 100s, up to 120s with activity. VAD values within patient norms.   VS: MAP 78, other VSS.   IV:PIV-SL, DL PICC w/dobutamine at 5 mcg/kg/min and bumex at 2mg/hour.   Tubes:NA  Neuro: A/Ox4, flat affect today, although did smile and laugh a few times.   Resp:Denies shortness of breath, bu appeared short of breath after walking with therapy. Stated that he wasn't any mort of breath than usual. On RA.   GI/:Had loose stool x1 this shift. Good UO with IV bumex, diamox. To get 1000mg IV Diuril today.   Nutrition:Ate 100% of his breakfast and lunch, although he didn't order much for each meal. States that he watches his fluids but requests more than he can have.   Endo:No sliding scale needed for FSG.   Skin:Bruises on arms, otherwise intact.   Activity:Up to bathroom and ambulated a short way in halls with therapy. Declined to get up into chair for meals but did sit on side of bed for lunch. Wanted to go back to bed instead of the chair after walk.   Pain: Denies pain. Has cream for arthritis which he declined to use.   Social:No visitors this shift. Has cell phone at bedside.   Plan: Continue diuresis, encourage increased activity and participation in cares. Continue current cares and notify providers with questions or concerns.

## 2023-08-07 NOTE — PROGRESS NOTES
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 He was hypotensive in ED so started on dopamine in addition to dobutamine.     Today's Plan:  - Continue dobutamine at 5 mcg/kg/hr  - Continue bumex 2 mg/hr, plus 1000 mg IV  BID today if electrolytes allow  - Diamox 500 mg TID today  - Daily Cardiomems reading, floor machine limiting our ability to obtain one    # Acute on chronic biventricular systolic heart failure secondary to NICM (LVEF 15-20% per TTE 9/2022)  # s/p HM3 LVAD (2/18/20, DT) c/b late RV failure  # Inotrope dependence/cardiogenic shock  # Troponinemia, demand ischemia due to HF  Stage D, NYHA Class IV. Multiple hospitalizations for decompensated RV failure.   RHC 6/21/23 RA 15, mPA 26, PCW 16, CI 2.57 at LVAD speed 5800rpm.  VAD speed increased to 5900 rpm. He was started on IV dobutamine 2.5 mcg/kg/min and discharged home on this. Palliative care was consulted, plan is to continue aggressive medical management.      Patient presented with ~20 lb weight gain (199 lb, EDW ~180 lb), elevated NT pro BNP, BERNARDA on CKD, hyponatremia all c/w hypervolemia due to severe BiV heart failure. Trop mildly elevated, per baseline, due to demand ischemia with HF. Developed hypotension in the ED MAP mid 50s so briefly started on dopamine 2.5 mcg/kg/min.      - Fluid status: hypervolemic, Bumex gtt 2mg/hr, with diuril and diamox as above  - RV support/inotropy: dobutamine 5mcg/kg/min increased 7/28 (briefly on dopamine for hypotension)  - ACEi/ARB/ARNi:  deferred due to renal dysfunction  - Afterload reduction: holding PTA hydrochlorothiazide 75 mg TID, resume closer to discharge   - BB:  contraindicated due to RV dysfunction  - Aldosterone antagonist: contraindicated due to renal dysfunction  - SGLT2i: Jardiance d/c'ed due to renal dysfunction  - SCD ppx:  ICD  - Antiplatelet: not on PTA, due to past epistaxis  - Anticoagulation: warfarin goal INR 1.7-2.3 (due to epistaxis). INR today 2.38  - MAPs:  78-82, within goal  - LDH:  baseline 300s -> 435 on admission, 343 most recently  - MEMS: CardioMEMS 26 7/30 and 8/2 (baseline ~22), requesting daily checks  - GOC: prior conversations patient and wife consistent with restorative/aggressive GOC, continue to discuss given progression of disease. They are willing to meet with palliative care this admission for a check-in on things are going. I have requested a consult for tomorrow afternoon, when Chapis, his wife, is here. Consider a formal GOC meeting later this week pending clinical course     # Chronic LVAD drive line infection, MSSA and PSA  Follows with LVAD ID. On outpatient IV cefepime. Infection appears to be well-controlled currently.WBC 11.7 on admissions but per wife, site looks better than it has in awhile. WBC normalized and now elevated again in the setting of c. Idff.  - Continue pta IV cefepime q24hr, renally dose 1 g  - He does not have antibiotic coverage for home IV antibiotics, so as an outpatient he goes to his local hospital for these daily     # BERNARDA on CKD, cardiorenal  Cr 3.4 on admission, baseline is mid 2s. BERNARDA secondary to CRS/hypervolemia. Briefly improved with diuresis, but now back to mid 3s.  - q12hr BMP  - diuresis as above  - dobutamine as above     # NSVT  - continue pta amiodarone 200mg daily  - aim for K>4 Mg>2.      # Hypervolemic hyponatremia, chronic, resolved  Na 126 on admission, hypervolemic hyponatremia in setting of HF. Anticipate will improve with diuresis,  - q12h BMP     #  Liver cirrhosis with portal hypertension  # Ascites   Liver biopsy during recent admission revealed cirrhosis - likely multifactorial (remote EtOH misuse, NAFLD, chronic congestive hepatopathy from RV failure).  No evidence of decompensation. Hepatology consulted 5/30, no concern for HE though at risk for. A He does have abdominal pain, but improving with admission and related to needing to pass gas and have Bms. Not c/w CBP, especially given it has been ongoing for >1week. Defering diagnostic paracentesis currently given risks vs benefits.   - Goal 2 BM daily, scheduled miralax hold for loose stools  - Intermittent  LFTs     # BEATRIZ  Worsening anxiety lately per Chapis, wife. Previously on paxil but was weaned off in May (when he had ecephalopthy) and started on seroquel. Wife requesting an alterative and patient agreeable.  - increased seroquel 25 mg at HS then decreased back to 12.5 mg per patient made him drowsy  - prn atrax  for itching, anxiety  - patient requesting to resume paxil, started 10 mg on 7/29, titrate as outpatient     # GOC. Prior conversations patient and wife consistent with restorative/aggressive GOC, continue to discuss given progression of disease. Given the lack of progress this admission and Eilseo's overall frustration with his healthcare burden, patient and wife having ongoing discussions.   - Palliative consult for Tuesday when wife is here given limited progress this admission, limited treatment options, and guarded prognosis  - Consider formal GOC conference pending clinic course and initial Palliative care visit    # C. Diff. Patient with new diarrhea. Also with leukocytosis. C. Diff positive on 8/6. He had a case of c. Diff about 3-4 months ago,  but doesn't recall infections prior to that.  - ID consult for recurrent c. Diff, recommendations pending  - PO vancomycin 500 mg QID for at least 10 days, final course length per ID  - Started probiotic    OTHER/CHRONIC CONDITIONS:  "  Hypothyroidism:  7/27 TSH 7.5 free T4 1.4, PTA levothyroxine 100mcg qAM.   GERD:  PTA pantoprazole   Normocytic anemia/BELA:  Baseline 8-9, stable. PTA ferrous sulfate 325mg daily.   BPH:  PTA finasteride 5mg daily and tamsulosin 0.8mg qPM.  DMII:  A1c 6.3 , LDSSI, hypoglycemia protocol while inpatient, diet controlled as outpatient   Gout:  pta allopurinol 200mg daily  h/o HIT  ABHINAV:  pta CPAP     ================================================================    Subjective/24-Hr Events:   Last 24 hr care team notes reviewed. No overnight events. Stomach feeling much better. Having less diarrhea. Denies CP, SOB, palpitations, lightheadedness, orthopnea, PND. Appetite improved. No N/V. He still feels like he has extra fluid.    ROS:  4 point ROS including respiratory, CV, GI and  (other than that noted in the HPI) is negative.     Medications: Reviewed in EPIC.     Physical Exam:   BP (!) 82/60   Pulse 105   Temp 98.3  F (36.8  C) (Oral)   Resp 20   Ht 1.702 m (5' 7\")   Wt 86.2 kg (190 lb 1.6 oz)   SpO2 99%   BMI 29.77 kg/m      GENERAL: Appears comfortable, in no distress.  HEENT: Eye symmetrical, no discharge or icterus bilaterally.   NECK: Supple, JVD at mandible at 45 degrees  CV: tachycardic, + LVAD hum  RESPIRATORY: Respirations regular, even, and unlabored. Lungs CTA throughout.    GI: Soft and obese. Bowel sounds present thoughout. Non-tender to palpation.  EXTREMITIES: Trace BLE peripheral edema.   NEUROLOGIC: Alert and interacting appropriatly. No focal deficits.   MUSCULOSKELETAL: No joint swelling or tenderness.   SKIN: No jaundice. No rashes or lesions.     Labs:  CMP  Recent Labs   Lab 08/07/23  0757 08/07/23  0626 08/06/23  2131 08/06/23  1759 08/06/23  1410 08/06/23  0612 08/05/23 2134 08/05/23  1826 08/03/23  0844 08/03/23  0510 08/02/23 2138 08/02/23  1806   NA  --  134*  --  133*  --  136  --  133*   < > 136  --  135*   POTASSIUM  --  3.7  --  3.9  --  4.0  --  4.4   < > 3.2*  --  5.0 "   CHLORIDE  --  101  --  98  --  100  --  99   < > 100  --  101   CO2  --  20*  --  20*  --  19*  --  20*   < > 22  --  20*   ANIONGAP  --  13  --  15  --  17*  --  14   < > 14  --  14   * 153* 160* 203*   < > 144*   < > 198*   < > 132*   < > 174*   BUN  --  84.0*  --  85.2*  --  83.7*  --  87.1*   < > 91.2*  --  91.9*   CR  --  3.56*  --  3.10*  --  3.48*  --  3.48*   < > 3.43*  --  3.34*   GFRESTIMATED  --  18*  --  21*  --  18*  --  18*   < > 18*  --  19*   CALOS  --  8.4*  --  8.4*  --  8.5*  --  8.5*   < > 8.6*  --  8.8   MAG  --  2.1  --  2.3  --  2.3  --  2.5*   < > 1.8  --  1.9   PROTTOTAL  --   --   --   --   --   --   --   --   --  7.0  --  7.4   ALBUMIN  --   --   --   --   --   --   --   --   --  3.5  --  3.7   BILITOTAL  --   --   --   --   --   --   --   --   --  0.3  --  0.4   ALKPHOS  --   --   --   --   --   --   --   --   --  99  --  102   AST  --   --   --   --   --   --   --   --   --  50*  --  54*   ALT  --   --   --   --   --   --   --   --   --  34  --  36    < > = values in this interval not displayed.       CBC  Recent Labs   Lab 08/07/23  0626 08/06/23  0706 08/06/23  0612 08/05/23  0511 08/04/23  0456   WBC 8.2  --  11.3* 8.5 10.1   RBC 3.31*  --  2.52* 3.29* 3.18*   HGB 8.6* 8.5* 6.5* 8.8* 8.5*   HCT 29.7*  --  22.5* 28.6* 28.4*   MCV 90  --  89 87 89   MCH 26.0*  --  25.8* 26.7 26.7   MCHC 29.0*  --  28.9* 30.8* 29.9*   RDW 19.6*  --  19.8* 19.6* 19.4*     --  255 216 217       INR  Recent Labs   Lab 08/07/23  0626 08/06/23  0612 08/05/23  0511 08/04/23  0456   INR 2.38* 2.01* 2.07* 2.92*       Time/Communication  I personally spent a total of 40 minutes. Of that 20 minutes was counseling/coordination of patient's care. Plan of care discussed with patient. See my note above for details.    Patient discussed with Dr. Nato Decker PA-C  Advanced Heart Failure/Cardiology II Service  Pager [phone] [facility] [phone]

## 2023-08-07 NOTE — PLAN OF CARE
D: Pt fatigued and reports no appetite. AVSS, afebrile, LVAD numbers WNL. No pain or shortness of breath noted. 1800 mealtime insulin not yet given as pt declined dinner. Writer and pt agreed to recheck BG prior to snack. Noon labs collected late.  Bumex gtt at 2 mg/hr. Dobutamine gtt at 5 mcg/kg/min. IV push Diuril given with increased UOP.  I: Monitored/assessed pt. Assisted with cares.  A: Pt stable and comfortable.  P: Continue to monitor/assess pt, contact provider with concerns.

## 2023-08-07 NOTE — CONSULTS
General ID Service: Consult Note         Attestation:  This patient has been seen and evaluated by me.  I discussed this patient, treatment plan with  Roldan Mendoza MS4 and agree with the findings and plan in this note. I also personally edited this note to reflect my findings. I have reviewed today's vital signs, medications, labs and imaging.    patient is asleep when seen. spoke with RN. he did not sleep much last night. Had many BM yesterday. Today, he had 2 bowel movements since 6 am.     revisit patient this afternoon. no appetite but denies nausea, vomiting. has mild lower abdominal discomfort and feels bloated. no fever/ chills. LVAD site without drainage.     First episode of C.diff. Continue vancomycin 125 mg po 4x/daily x 10 days then 2x/daily , likely long term  due to to chronic use of antibiotic ( Cefepime) for Chronic LVAD driveline infection ( MSSA 11/2020 and P.aeruginosa (3/2/23)   normal WBC, afebrile     Avoid lactose/ dairy products until diarrhea has resolved.    Check CRP     ID will continue to follow    Jeff Horvath MD,M.Med.Sc.  Infectious Diseases  Pager: [phone]                Patient:  Eliseo Tanner, Date of birth 1955, Medical record number [id number]  Date of Visit:  August 7, 2023  Reason for consult: C. Diff treatment on chronic cefepime       Assessment and Recommendations:     ID Problem list:  1. 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 History of MSSA bacteremia secondary to MSSA driveline infection  3. History of pre-op Proteus and Enterococcus bacteremia (2020)  4. History of severe Legionella pneumonia (serogroup 1L) /b  cardiogenic shock, renal failure requiring CRRT, and resp failure  requiring intubation (2021)  5. NICM s/p LVAD and ICD (placed 2/18/20) with end stage RV failure  6. Stage IV Cirrhosis (diagnosed 5/2/23)  7. CKD Stage  3  8. Clostridium difficile diarrhea (8/6/23)      Discussion:  Eliseo is a 69 yo M with a PMH of chronic polymicrobial driveline infections (MSSA and PsA) on suppressive Cefepime, NICM s/p LVAD + ICD (2/2020) with RV failure, Stage IV Cirrhosis, and CKD presenting with shortness of breath and increased fluid retention.     On 8/6, he remained afebrile, but reported new onset diarrhea and developed a leukocytosis of 11.3, which had previously been elevated on admission (7/27 WBC: 11.7), but normalized. C. Diff antigen testing was positive 8/6 and he was started on IV vancomycin 125 mg QID.     He has not previously has not tested positive for C. Diff per chart review, so we would consider this his first episode of C. Diff. Given the lack of colitis symptoms on exam and imaging from CT abdomen obtained on 8/2, we have low suspicion for C Diff colitis or severe infection, he should complete the usual 10 day course of 125 mg IV Vanc QID. After that course of antibiotics is complete, we recommend starting oral vancomycin prophylaxis (OVP) given his ongoing need for suppressive Cefepime and the well-known risk of 2nd/3rd/4th generation cephalosporins causing C. Diff infection (1). We recommend that he follow up in LVAD ID clinic, where they can monitor and titrate the OVP based on his symptoms.    Recs:  1. Continue treatment of C. Difficile with Oral Vancomycin 125 mg 4 times daily for 10 days  2. Then transition to oral vancomycin prophylaxis (OVP) 125 mg  BID and can titrate as needed based on symptoms  3. Continue regular follow-ups with LVAD ID clinic to adjust OVP      Thank you for allowing us to participate in the care of this patient. ID will continue to follow. Can call with questions.     Recs given to primary team.    Roldan Mendoza, MS4   08/07/2023         History of Present Illness:   Eliseo is a 69 yo M with a PMH of chronic polymicrobial driveline infections (MSSA and PsA) on suppressive Cefepime, NICM  s/p LVAD + ICD (2/2020) with RV failure, Stage IV Cirrhosis, and CKD presenting with shortness of breath and increased fluid retention.     He states diarrhea has been ongoing since admission and today has had 2-3 bouts of diarrhea before noon. He does not have any cramping or pain in his abdomen. Does not have any pain at LVAD site and had dressing change done yesterday.       Review of Systems:   Full 10 point ROS obtained, pertinent positives and negatives as above.       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)     MSSA     Legionella pneumonia (H)      LVAD (left ventricular assist device) present (H)      MSSA bacteremia 11/2020     Past Surgical History:   Procedure Laterality Date     ANESTHESIA CARDIOVERSION N/A 02/28/2020    Procedure: ANESTHESIA, FOR CARDIOVERSION;  Surgeon: GENERIC ANESTHESIA PROVIDER;  Location: [facility] OR     COLONOSCOPY N/A 05/22/2023    Procedure: COLONOSCOPY, WITH POLYPECTOMY AND BIOPSY;  Surgeon: Myles Mota DO;  Location: [facility] GI     CV CARDIOMEMS WITH RIGHT HEART CATH N/A 09/20/2022    Procedure: Pulmonary Arterial Pressure Sensor Placement CPT Codes to be cleared by financial securing for this implant. [id number] and ;  Surgeon: Dalton Baeza MD;  Location:  HEART CARDIAC CATH LAB     CV CENTRAL VENOUS CATHETER PLACEMENT N/A 02/13/2020    Procedure: Central Venous Catheter Placement;  Surgeon: Chente Moss MD;  Location:  HEART CARDIAC CATH LAB     CV INTRA AORTIC BALLOON N/A 02/07/2020    Procedure: Intra-Aortic Balloon Pump Insertion;  Surgeon: Jose Baldwin MD;  Location:  HEART CARDIAC CATH LAB     CV INTRA AORTIC BALLOON N/A 02/13/2020    Procedure: Intra-Aortic Balloon Pump Insertion;  Surgeon: Chente Moss  MD Omaira;  Location:  HEART CARDIAC CATH LAB     CV RIGHT HEART CATH MEASUREMENTS RECORDED N/A 09/21/2020    Procedure: CV RIGHT HEART CATH;  Surgeon: Dalton Baeza MD;  Location:  HEART CARDIAC CATH LAB     CV RIGHT HEART CATH MEASUREMENTS RECORDED N/A 01/07/2021    Procedure: Right Heart Cath;  Surgeon: Chun Ball MD;  Location:  HEART CARDIAC CATH LAB     CV RIGHT HEART CATH MEASUREMENTS RECORDED N/A 02/10/2022    Procedure: CV RIGHT HEART CATH;  Surgeon: Dalton Baeza MD;  Location:  HEART CARDIAC CATH LAB     CV RIGHT HEART CATH MEASUREMENTS RECORDED N/A 09/20/2022    Procedure: Right Heart Catheterization [[id number]];  Surgeon: Dalton Baeza MD;  Location:  HEART CARDIAC CATH LAB     CV RIGHT HEART CATH MEASUREMENTS RECORDED N/A 12/12/2022    Procedure: Right Heart Cath;  Surgeon: Chente Moss MD;  Location:  HEART CARDIAC CATH LAB     CV RIGHT HEART CATH MEASUREMENTS RECORDED N/A 04/28/2023    Procedure: Right Heart Catheterization;  Surgeon: Aaliyah Fischer MD;  Location:  HEART CARDIAC CATH LAB     CV RIGHT HEART CATH MEASUREMENTS RECORDED N/A 06/21/2023    Procedure: Right Heart Catheterization;  Surgeon: Nader Cisneros MD;  Location:  HEART CARDIAC CATH LAB     CV SWAN LUCIANA PROCEDURE N/A 02/13/2020    Procedure: Gattman Luciana Procedure;  Surgeon: Chente Moss MD;  Location:  HEART CARDIAC CATH LAB     EP ICD Bilateral 03/16/2020    Procedure: EP ICD;  Surgeon: Dali Day MD;  Location:  HEART CARDIAC CATH LAB     ESOPHAGOSCOPY, GASTROSCOPY, DUODENOSCOPY (EGD), COMBINED N/A 05/22/2023    Procedure: ESOPHAGOGASTRODUODENOSCOPY, WITH BIOPSY;  Surgeon: Myles Mota DO;  Location:  GI     INCISION AND DRAINAGE CHEST WASHOUT, COMBINED N/A 12/11/2022    Procedure: INCISION AND DRAINAGE OF DRIVELINE;  Surgeon: Mac Jaramillo MD;  Location:  OR     INSERT VENTRICULAR ASSIST DEVICE LEFT (HEARTMATE II) N/A  2020    Procedure: INSERTION, LEFT VENTRICULAR ASSIST DEVICE (HEARTMATE III);  Surgeon: Mac Jaramillo MD;  Location: [facility] OR     IR CVC TUNNEL PLACEMENT > 5 YRS OF AGE  2021     IR CVC TUNNEL REMOVAL RIGHT  2021     IR TRANSCATHETER BIOPSY  2023     MIDLINE INSERTION - DOUBLE LUMEN Left 12/15/2022    left basilic 5 fr dl midline 20 cm     PICC DOUBLE LUMEN PLACEMENT Right 2023    PICC rewired right basilic vein 42cm total 1cm external.     THORACOSCOPY Right 2020    Procedure: RIGHT VIDEO-ASSISTED THORASCOPIC SURGERY, EVACUATION OF HEMOTHORAX, PLACEMENT OF CHEST TUBES;  Surgeon: William Gan MD;  Location: [facility] OR     Otherwise as per HPI      Allergies:      Allergies   Allergen Reactions     Heparin      HIT screen positive 20, reflex DAVINA negative; however heme recommended treating as if positive  HIT screen negative 20     Oxycodone Other (See Comments) and Itching            Current Antimicrobials:   IV Cefepime 1g daily  IV Vancomycin 125 4x daily for 10 days       Family History:   Reviewed and noncontributory       Social History:     Social History     Tobacco Use     Smoking status: Former     Types: Cigarettes     Quit date: 1994     Years since quittin.3     Smokeless tobacco: Never   Vaping Use     Vaping Use: Never used   Substance Use Topics     Alcohol use: Not Currently     Drug use: Never     Otherwise as per HPI         Physical Exam:   Ranges forvital signs:  Temp:  [97.5  F (36.4  C)-98.3  F (36.8  C)] 98.3  F (36.8  C)  Pulse:  [101-105] 105  Resp:  [18-20] 20  SpO2:  [92 %-99 %] 99 %  GENERAL: Lying in bed, No acute distress. Hard of hearing.  ENT:  Head is normocephalic, atraumatic.   EYES:  Sclera are anicteric  LUNGS:  Clear to auscultation.  CARDIOVASCULAR:  LVAD hum  ABDOMEN:  Distended, no pain with palpation, no rebound tenderness or guarding, bowel sounds heard in all quadrants  EXT: Extremities warm. Peripheral pitting  edema +1  SKIN:  No acute rashes.  Bruises on hands.  NEUROLOGIC:  Awake, alert, interactive.         Laboratory Data:     Inflammatory Markers    Recent Labs   Lab Test 05/27/23  0654 05/25/23  1059 03/24/23  0749 12/10/22  0616 12/08/22  1256 02/16/21  2219 02/15/21  1910   SED  --   --   --   --  49* 72* 47*   CRPI 27.60* 14.60* 51.70* 26.30* 31.50*  --   --        Hematology Studies    Recent Labs   Lab Test 08/07/23  0626 08/06/23  0706 08/06/23  0612 08/05/23  0511 08/04/23  0456 08/03/23  0510 08/02/23  0601 03/09/21  0510 03/07/21  0543 03/06/21  0430 03/05/21  0400 03/04/21  0405 03/03/21  0455 03/02/21  0401   WBC 8.2  --  11.3* 8.5 10.1 8.7 9.7   < > 4.5 4.4 5.4 5.6 7.0 7.3   ANEU  --   --   --   --   --   --   --   --  2.6 2.8 3.6 3.8 5.3 5.6   AEOS  --   --   --   --   --   --   --   --  0.3 0.2 0.2 0.1 0.1 0.1   HGB 8.6* 8.5* 6.5* 8.8* 8.5* 8.2* 8.6*   < > 7.9* 8.0* 7.7* 8.2* 8.1* 9.2*   MCV 90  --  89 87 89 89 88   < > 82 82 81 81 80 82     --  255 216 217 205 218   < > 208 196 210 224 236 218    < > = values in this interval not displayed.       Metabolic Studies     Recent Labs   Lab Test 08/07/23  0626 08/06/23  1759 08/06/23  0612 08/05/23  1826 08/05/23  0511   * 133* 136 133* 135*   POTASSIUM 3.7 3.9 4.0 4.4 3.5   CHLORIDE 101 98 100 99 99   CO2 20* 20* 19* 20* 19*   BUN 84.0* 85.2* 83.7* 87.1* 88.1*   CR 3.56* 3.10* 3.48* 3.48* 3.46*   GFRESTIMATED 18* 21* 18* 18* 18*       Hepatic Studies    Recent Labs   Lab Test 08/03/23  0510 08/02/23  1806 07/27/23  1031 06/20/23  1550 05/29/23  0534 05/25/23  1059   BILITOTAL 0.3 0.4 0.4 0.4 0.6 0.6   ALKPHOS 99 102 108 135* 170* 140*   ALBUMIN 3.5 3.7 3.6 3.3* 3.7 3.1*   AST 50* 54* 64* 46* 69* 66*   ALT 34 36 39 44 50 52*       Microbiology:  Culture   Date Value Ref Range Status   06/20/2023 No Growth  Final   06/20/2023 No Growth  Final   05/27/2023 No Growth  Final   05/25/2023 No Growth  Final   05/25/2023 No Growth  Final   05/11/2023  3+ Pseudomonas aeruginosa (A)  Final   05/11/2023 2+ Pseudomonas aeruginosa (A)  Final   03/24/2023 3+ Pseudomonas aeruginosa (A)  Final   03/24/2023 3+ Pseudomonas aeruginosa (A)  Final   03/24/2023 3+ Pseudomonas aeruginosa (A)  Final   03/24/2023 1+ Staphylococcus warneri (A)  Final     Comment:     Susceptibilities not routinely done, refer to antibiogram to view typical susceptibility profiles   03/02/2023 1+ Pseudomonas aeruginosa (A)  Final   03/02/2023 1+ Pseudomonas aeruginosa (A)  Final   03/02/2023 1+ Staphylococcus aureus (A)  Corrected     Comment:     Susceptibility testing requested by Sunni Jimenez pharmacist ([id number]) to check slightly elevated vanco result. 3.8.23 at 1047  Susceptibility testing requested by Sunni Jimenez, pharmacist for [facility].    03/02/2023 1+ Staphylococcus aureus (A)  Final   03/02/2023 No anaerobic organisms isolated  Final   12/11/2022 No anaerobic organisms isolated  Final   12/11/2022 No Growth  Final   12/11/2022 1+ Staphylococcus aureus (A)  Final     Comment:     Susceptibilities done on previous cultures   12/11/2022 1+ Staphylococcus aureus (A)  Final     Comment:     Susceptibilities done on previous cultures   12/08/2022 No Growth  Final   12/08/2022 2+ Staphylococcus aureus (A)  Final   12/08/2022 2+ Staphylococcus aureus (A)  Final   12/08/2022 No Growth  Final   10/18/2022 2+ Staphylococcus aureus (A)  Final   10/18/2022 2+ Staphylococcus aureus (A)  Final   09/20/2022 2+ Staphylococcus aureus (A)  Final   09/20/2022 1+ Normal freeman  Final   09/20/2022 No anaerobic organisms isolated  Final   02/10/2022 1+ Staphylococcus aureus (A)  Final   02/10/2022 1+ Cutibacterium (Propionibacterium) acnes (A)  Final     Comment:     Susceptibility testing of Cutibacterium (Propionibacterium) species is not done from this source. This organism is susceptible to penicillin and cefotaxime and most are susceptible to clindamycin.   08/17/2021 No Growth  Final   08/17/2021 No Growth   Final   08/17/2021 1+ Staphylococcus aureus (A)  Final   08/17/2021 No anaerobic organisms isolated  Final       Urine Studies    Recent Labs   Lab Test 06/20/23 2010 05/25/23  1641 05/20/23  2050 04/18/23  0548 02/16/21  0225   LEUKEST Negative Negative Negative Negative Negative   WBCU <1 1 <1 <1 0       Vancomycin Levels    Recent Labs   Lab Test 12/10/22  1948 02/16/20  0400 02/14/20  0331   VANCOMYCIN 11.3 18.5 16.2     Hepatitis B Testing   Recent Labs   Lab Test 04/27/23  0708 02/28/21  1155 02/12/20  0440   HBCAB Nonreactive  --  Nonreactive   HEPBANG Nonreactive Nonreactive Nonreactive     Hepatitis C Testing     Hepatitis C Antibody   Date Value Ref Range Status   04/27/2023 Nonreactive Nonreactive Final   02/12/2020 Nonreactive NR^Nonreactive Final     Comment:     Assay performance characteristics have not been established for newborns,   infants, and children     02/08/2020 Nonreactive NR^Nonreactive Final     Comment:     Assay performance characteristics have not been established for newborns,   infants, and children            Imaging:   CT Abdomen/Pelvis (8/2/23)  1. Mild to moderate ascites with fluid in the pelvis, bilateral  paracolic gutter, perihepatic and perisplenic locations, new compared to CT from 5/20/23.  2. Cholelithiasis with nondistended gallbladder and mild  pericholecystic fluid, more likely secondary to ascites, if concern  for cholecystitis ultrasound may be performed.  3. Limited evaluation for appendix with appendix not definitively  identified, fluid in the paracolic gutter and along the right lower  quadrant precludes assessment for right lower quadrant inflammatory changes with noncontrast technique.  4. Small pleural effusions, left more than right.  5. Anterior rib fractures involving the ninth and 10th ribs, possibly  acute, not seen on prior CT from 5/20/2023    XR Abdomen (8/2/23)  Nonobstructive bowel gas pattern. Small stool burden.    CXR (7/27)  Clear lungs. Support  devices in place.

## 2023-08-08 NOTE — PROGRESS NOTES
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 He was hypotensive in ED so started on dopamine in addition to dobutamine.     Today's Plan:  - Continue bumex 2 mg/hr, 1000 mg IV BID   - pall care to see patient today  - will update wife in person this PM with VAD coordinator    # Acute on chronic biventricular systolic heart failure secondary to NICM (LVEF 15-20% per TTE 9/2022)  # s/p HM3 LVAD (2/18/20, DT) c/b late RV failure  # Inotrope dependence/cardiogenic shock  # Troponinemia, demand ischemia due to HF  Stage D, NYHA Class IV. Multiple hospitalizations for decompensated RV failure.   RHC 6/21/23 RA 15, mPA 26, PCW 16, CI 2.57 at LVAD speed 5800rpm.  VAD speed increased to 5900 rpm. He was started on IV dobutamine 2.5 mcg/kg/min and discharged home on this. Palliative care was consulted, plan is to continue aggressive medical management.      Patient presented with ~20 lb weight gain (199 lb, EDW ~180 lb), elevated NT pro BNP, BERNARDA on CKD, hyponatremia all c/w hypervolemia due to severe BiV heart failure. Trop mildly elevated, per baseline, due to demand ischemia with HF. Developed hypotension in the ED MAP mid 50s so briefly started on dopamine 2.5 mcg/kg/min.      - Fluid status: hypervolemic, Bumex gtt 2mg/hr, IV diuril 1g BID and diamox TID  - RV support/inotropy: dobutamine 5mcg/kg/min increased 7/28 (briefly on dopamine for hypotension)  - ACEi/ARB/ARNi: deferred due to renal dysfunction  - Afterload reduction: holding PTA  hydrochlorothiazide 75 mg TID, resume closer to discharge   - BB:  contraindicated due to RV dysfunction  - Aldosterone antagonist: contraindicated due to renal dysfunction  - SGLT2i: Jardiance d/c'ed due to renal dysfunction  - SCD ppx:  ICD  - Antiplatelet: not on PTA, due to past epistaxis  - Anticoagulation: warfarin goal INR 1.7-2.3 (due to epistaxis). INR today 2.4  - MAPs: 70s-80s  - LDH:  baseline 300s   - MEMS: monitor daily  - GOC: prior conversations patient and wife consistent with restorative/aggressive GOC, continue to discuss given progression of disease. They are willing to meet with palliative care this admission for a check-in on things are going. Consider a formal GOC meeting later this week pending clinical course     # Chronic LVAD drive line infection, MSSA and PSA  Follows with LVAD ID. On outpatient IV cefepime. Infection appears to be well-controlled currently.WBC 11.7 on admissions but per wife, site looks better than it has in awhile. WBC normalized and now elevated again in the setting of c. Idff.  - pta IV cefepime q24hr, renally dose 1 g  - he does not have antibiotic coverage for home IV antibiotics, so as an outpatient he goes to his local hospital for these daily     # BERNARDA on CKD, cardiorenal  Cr 3.4 on admission, baseline is mid 2s. BERNARDA secondary to CRS/hypervolemia. Briefly improved with diuresis, but now back to mid 3s.  - q12hr BMP  - diuresis as above  - dobutamine as above     # NSVT  - pta amiodarone 200mg daily  - aim for K>4 Mg>2.      # Hypervolemic hyponatremia, chronic, resolved  Na 126 on admission, hypervolemic hyponatremia in setting of HF. Anticipate will improve with diuresis,  - q12h BMP     # Liver cirrhosis with portal hypertension  # Ascites   Liver biopsy during recent admission revealed cirrhosis - likely multifactorial (remote EtOH misuse, NAFLD, chronic congestive hepatopathy from RV failure).  No evidence of decompensation. Hepatology consulted 5/30, no  concern for HE though at risk for. A He does have abdominal pain, but improving with admission and related to needing to pass gas and have Bms. Not c/w CBP, especially given it has been ongoing for >1week. Defering diagnostic paracentesis currently given risks vs benefits.   - Goal 2 BM daily, scheduled miralax hold for loose stools  - Intermittent  LFTs     # BEATRIZ  Worsening anxiety lately per Chapis, wife. Previously on paxil but was weaned off in May (when he had ecephalopthy) and started on seroquel. Wife requesting an alterative and patient agreeable.  - increased seroquel 25 mg at HS then decreased back to 12.5 mg per patient made him drowsy  - prn atrax  for itching, anxiety  - patient requesting to resume paxil, started 10 mg on 7/29, titrate as outpatient     # GOC. Prior conversations patient and wife consistent with restorative/aggressive GOC, continue to discuss given progression of disease. Given the lack of progress this admission and Eliseo's overall frustration with his healthcare burden, patient and wife having ongoing discussions.   - Palliative consult for Tuesday when wife is here given limited progress this admission, limited treatment options, and guarded prognosis  - Consider formal GOC conference pending clinic course and initial Palliative care visit    # C. Diff. Patient with new diarrhea. Also with leukocytosis. C. Diff positive on 8/6. He had a case of c. Diff about 3-4 months ago,  but doesn't recall infections prior to that.  - ID consult for recurrent c. Diff  - PO vancomycin 500 mg QID for at least 10 days, final course length per ID   - transition to oral vancomycin prophylaxis (OVP) 125 mg  BID and can titrate as needed based on symptoms   - Started probiotic    OTHER/CHRONIC CONDITIONS:   Hypothyroidism:  7/27 TSH 7.5 free T4 1.4, PTA levothyroxine 100mcg qAM.   GERD:  PTA pantoprazole   Normocytic anemia/BELA:  Baseline 8-9, stable. PTA ferrous sulfate 325mg daily.   BPH:  PTA  "finasteride 5mg daily and tamsulosin 0.8mg qPM.  DMII:  A1c 6.3 , LDSSI, hypoglycemia protocol while inpatient, diet controlled as outpatient   Gout:  pta allopurinol 200mg daily  h/o HIT  ABHINAV:  pta CPAP    Siena Aguiar DNP, NP-C  Advanced Heart Failure/Cardiology 2 Nurse Practitioner  Pager          ================================================================    Subjective/24-Hr Events:   Last 24 hr care team notes reviewed. No overnight events. Didn't sleep well. Lost 1 lb overnight. Breathing is unchanged. No new symptoms today..     ROS:  4 point ROS including respiratory, CV, GI and  (other than that noted in the HPI) is negative.     Medications: Reviewed in EPIC.     Physical Exam:   BP (!) 82/60   Pulse 101   Temp 97.3  F (36.3  C) (Oral)   Resp 18   Ht 1.702 m (5' 7\")   Wt 85.7 kg (189 lb)   SpO2 93%   BMI 29.60 kg/m      GENERAL: Appears comfortable, in no distress.  HEENT: Eye symmetrical, no discharge or icterus bilaterally.   NECK: Supple, JVD at mandible at 45 degrees  CV: tachycardic, + LVAD hum  RESPIRATORY: Respirations regular, even, and unlabored. Lungs CTA throughout.    GI: Soft and obese. Bowel sounds present thoughout. Non-tender to palpation.  EXTREMITIES: Trace BLE peripheral edema.   NEUROLOGIC: Alert and interacting appropriatly. No focal deficits.   MUSCULOSKELETAL: No joint swelling or tenderness.   SKIN: No jaundice. No rashes or lesions.     Labs:  CMP  Recent Labs   Lab 08/08/23  0522 08/07/23  2139 08/07/23  1827 08/07/23  1748 08/07/23  0757 08/07/23  0626 08/06/23  2131 08/06/23  1759 08/03/23  0844 08/03/23  0510 08/02/23  2138 08/02/23  1806   *  --  132*  --   --  134*  --  133*   < > 136  --  135*   POTASSIUM 3.7  --  4.4  --   --  3.7  --  3.9   < > 3.2*  --  5.0   CHLORIDE 103  --  98  --   --  101  --  98   < > 100  --  101   CO2 19*  --  19*  --   --  20*  --  20*   < > 22  --  20*   ANIONGAP 13  --  15  --   --  13  --  15   < > 14  --  14 "   * 204* 203* 232*   < > 153*   < > 203*   < > 132*   < > 174*   BUN 80.9*  --  81.4*  --   --  84.0*  --  85.2*   < > 91.2*  --  91.9*   CR 3.72*  --  3.57*  --   --  3.56*  --  3.10*   < > 3.43*  --  3.34*   GFRESTIMATED 17*  --  18*  --   --  18*  --  21*   < > 18*  --  19*   CALOS 8.2*  --  8.0*  --   --  8.4*  --  8.4*   < > 8.6*  --  8.8   MAG 2.1  --  2.1  --   --  2.1  --  2.3   < > 1.8  --  1.9   PROTTOTAL  --   --   --   --   --   --   --   --   --  7.0  --  7.4   ALBUMIN  --   --   --   --   --   --   --   --   --  3.5  --  3.7   BILITOTAL  --   --   --   --   --   --   --   --   --  0.3  --  0.4   ALKPHOS  --   --   --   --   --   --   --   --   --  99  --  102   AST  --   --   --   --   --   --   --   --   --  50*  --  54*   ALT  --   --   --   --   --   --   --   --   --  34  --  36    < > = values in this interval not displayed.         CBC  Recent Labs   Lab 08/08/23  0522 08/07/23  0626 08/06/23  0706 08/06/23  0612 08/05/23  0511   WBC 7.9 8.2  --  11.3* 8.5   RBC 3.39* 3.31*  --  2.52* 3.29*   HGB 8.8* 8.6* 8.5* 6.5* 8.8*   HCT 30.7* 29.7*  --  22.5* 28.6*   MCV 91 90  --  89 87   MCH 26.0* 26.0*  --  25.8* 26.7   MCHC 28.7* 29.0*  --  28.9* 30.8*   RDW 19.7* 19.6*  --  19.8* 19.6*    200  --  255 216         INR  Recent Labs   Lab 08/08/23  0522 08/07/23  0626 08/06/23  0612 08/05/23  0511   INR 2.46* 2.38* 2.01* 2.07*         Time/Communication  I personally spent a total of 40 minutes. Of that 20 minutes was counseling/coordination of patient's care. Plan of care discussed with patient. See my note above for details.    Patient discussed with Dr. Dutton

## 2023-08-08 NOTE — PLAN OF CARE
D: Admitted 7/27 SOB, HF exacerbation, end stage RV failure.   I/A: Pt is A/Ox4. Flat and withdrawn. Afebrile. SR/ST. NSVT up to 8 beats overnights. Hr . MAP and LVAD numbers WNL. +BM 2x Large and loose. Void via external cath and 1x occurrence. Wears home briefs. BG ACHS. Unsteady gait; AO1 GB. Bumex 2mg/hr and Dobutamine 5mcg/kg/min via PICC. L PIV.     P: palliative consult with wife at noon    Goal Outcome Evaluation:      Plan of Care Reviewed With: patient    Overall Patient Progress: no changeOverall Patient Progress: no change

## 2023-08-08 NOTE — PROGRESS NOTES
Care Management Follow Up    Length of Stay (days): 12    Expected Discharge Date: 08/11/2023     Concerns to be Addressed: goals of care  Patient plan of care discussed at interdisciplinary rounds: Yes    Anticipated Discharge Disposition: Home Infusion, Outpatient Infusion Services vs hospice     Anticipated Discharge Services:  TBD   Anticipated Discharge DME:  none     Patient/family educated on Medicare website which has current facility and service quality ratings:  n/a   Education Provided on the Discharge Plan:  Not today   Patient/Family in Agreement with the Plan:  n/a     Referrals Placed by CM/SW:  none   Private pay costs discussed: Not applicable    Additional Information:  Pt and wife met w/ Palliative w/ their LVAD coordinator present. Goals of care conversation had and that pt has a poor prognosis. Understandably this was a lot to take in for pt and wife today. Plan for Palliative, LVAD Coordinator, Cards 2 and writer to meet w/ pt and wife at 11am to having ongoing goals of care conversations and discuss discharge planning.     Cintia Perkins, VERÓNICASW

## 2023-08-08 NOTE — PLAN OF CARE
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 He was hypotensive in ED so started on dopamine in addition to dobutamine.      Cardiac: ST 100s, up to 120s with activity. VAD values within patient norms.   VS: MAP 75, 90, other VSS.   IV:PIV-SL, DL PICC w/dobutamine at 5 mcg/kg/min and bumex at 2mg/hour.   Tubes:NA  Neuro: A/Ox4, flat affect today, states he\"just doesn't feel well\".   Resp:Denies shortness of breath, but agrees that he is more than yesterday upon further questioning. On RA.   GI/:Had loose stool x1 this shift. Good UO with IV bumex, diamox. To get 1000mg IV Diuril today.   Nutrition: Slept all AM, declining to get up. Finally woke up and ordered something to eat when his wife got here appx noon. Better at monitoring fluid intake but didn't drink while sleeping. Weight down 1 pound from yesterday.   Endo: before eating lunch, received 1 unit sliding scale.  Skin:Bruises on arms, scab on left forearm, otherwise intact.   Activity:Up to bedside commode, but declined to get up into a chair or ambulate in halls. Sat at the side of the bed for lunch and conference with wife, palliative care, VAD coordinator, NP.  Declined to get up into chair for meals but did sit on side of bed for lunch.    Pain: C/O right arm cramping, abdominal pain with palpation and skin pain with touching. Finally agreed to take Ultram and Atarax before care conference.   Social: Wife present for provider conversation. RN not present but wife was on phone crying when RN entered room for cares. Will meet again tomorrow.   Plan: Continue diuresis, encourage increased activity and participation in cares. Assess pain and treat as indicated. " Continue current cares and notify providers with questions or concerns.

## 2023-08-08 NOTE — PROGRESS NOTES
General ID Service: Follow-up Note      Attestation:  This patient has been seen and evaluated by me.  I discussed this patient and management plan with  BENITO Mejia and agree with the findings and plan in this note. I also personally edited this note to reflect my findings. I have reviewed today's vital signs, medications, labs and imaging.    C.diff diarrhea.   2 x diarrhea. not totally watery per RN. no blood. lower abdominal pain 5/10, abdomen is soft. afebrile. no appetite today.   no nausea, vomiting  monitor WBC and CRP daily   continue oral Vancomycin 125 mg po 4x/daily      Jeff Horvath MD           Patient:  Eliseo Tanner, Date of birth 1955, Medical record number [id number]  Date of Visit:  August 8, 2023  Reason for consult: C. 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) + Pseudomonas infection (3/2/23) on chronic suppressive Cefepime 2g IV daily  History of MSSA bacteremia secondary to MSSA driveline infection  History of pre-op Proteus and Enterococcus bacteremia (2020)  History of severe Legionella pneumonia (serogroup 1L) /b  cardiogenic shock, renal failure requiring CRRT, and resp failure  requiring intubation (2021)  NICM s/p LVAD and ICD (placed 2/18/20) with end stage RV failure  Stage IV Cirrhosis (diagnosed 5/2/23)  CKD Stage 3  Clostridium difficile diarrhea (8/6/23)      Discussion:  Eliseo is a 69 yo M with a PMH of chronic polymicrobial driveline infections (MSSA and PsA) on suppressive Cefepime, NICM s/p LVAD + ICD (2/2020) with RV failure, Stage IV Cirrhosis, and CKD presenting with shortness of breath and increased fluid retention.      On 8/6, he remained afebrile, but reported new onset diarrhea and developed a leukocytosis of 11.3, which had previously been elevated on admission (7/27 WBC: 11.7), but normalized. C. Diff antigen  testing was positive 8/6 and he was started on oral vancomycin 125 mg QID.      He has not previously has not tested positive for C. Diff per chart review, so we would consider this his first episode of C. Diff. Given the lack of colitis symptoms on exam and imaging from CT abdomen obtained on 8/2, we have low suspicion for C Diff colitis or severe infection, he should complete the usual 10 day course of 125 mg IV Vanc QID. He continues to have multiple diarrheal episodes and increased abdominal pain and reduced appetite today. If that continues, we would recommend changing his diet for bowel rest to reduce risk of complications to C. difficile like colitis/perforation. We recommend continuing to trend CRP and if there is an increase in CRP, the he should be made NPO and would require further abdominal assessment.    After the 10-day course of antibiotics is complete, we recommend starting oral vancomycin prophylaxis (OVP) given his ongoing need for suppressive Cefepime and the well-known risk of 2nd/3rd/4th generation cephalosporins causing C. Diff infection (1). We recommend that he follow up in LVAD ID clinic 2 weeks after discharge, where they can monitor and titrate the OVP based on his symptoms.     Recs:  Continue treatment of C. Difficile with Oral Vancomycin 125 mg 4 times daily for 10 days  Monitor abdominal pain, CRP, and appetite. If worsening, can switch to clear diet or NPO for bowel rest.  Then transition to oral vancomycin prophylaxis (OVP) 125 mg  BID and can titrate as needed based on symptoms  Continue regular follow-ups with LVAD ID clinic to adjust OVP as needed  Avoid lactose/dairy products until diarrhea has resolved    Recs given to primary team. Thank you for allowing us to participate in the care of this patient.     Attestation:  Roldan Mendoza, MS4    08/08/2023            Interval History:   Eliseo reports have a couple diarrheal episodes this morning. He does not have any fevers,  chills, nausea, vomiting, or increased abdominal pain. Has reduced appetite and did not eat dinner yesterday.         Review of Systems:   Full 8 point ROS obtained, pertinent positives and negatives as above.          Current Antimicrobials   Oral Vancomycin 125 mg QID  IV Cefepime 1g QD         Physical Exam:   Ranges for vital signs:  Temp:  [97.3  F (36.3  C)-98.4  F (36.9  C)] 97.6  F (36.4  C)  Pulse:  [101-105] 103  Resp:  [16-24] 18  SpO2:  [92 %-96 %] 94 %    Intake/Output Summary (Last 24 hours) at 8/8/2023 1048  Last data filed at 8/8/2023 0640  Gross per 24 hour   Intake 1730.04 ml   Output 2355 ml   Net -624.96 ml     Exam:  GENERAL: Lying in bed, No acute distress. Hard of hearing.  ENT:  Head is normocephalic, atraumatic.   EYES:  Sclera are anicteric  LUNGS:  Clear to auscultation.  CARDIOVASCULAR:  LVAD hum  ABDOMEN:  Distended, some tenderness with palpation, but no rebound tenderness or guarding, bowel sounds heard in all quadrants  EXT: Extremities warm. Peripheral pitting edema +1  SKIN:  No acute rashes.  Bruises on hands.  NEUROLOGIC:  Awake, alert, interactive.         Laboratory Data:       Inflammatory Markers    Recent Labs   Lab Test 08/08/23  0522 08/07/23  0626 08/06/23  1759 05/27/23  0654 05/25/23  1059 03/24/23  0749 12/10/22  0616 12/08/22  1256 02/16/21  2219 02/15/21  1910   SED  --   --   --   --   --   --   --  49* 72* 47*   CRPI 8.68* 9.67* 10.20* 27.60* 14.60* 51.70* 26.30* 31.50*  --   --        Hematology Studies    Recent Labs   Lab Test 08/08/23  0522 08/07/23  0626 08/06/23  0706 08/06/23  0612 08/05/23  0511 08/04/23  0456 08/03/23  0510 03/09/21  0510 03/07/21  0543 03/06/21  0430 03/05/21  0400 03/04/21  0405 03/03/21  0455 03/02/21  0401   WBC 7.9 8.2  --  11.3* 8.5 10.1 8.7   < > 4.5 4.4 5.4 5.6 7.0 7.3   ANEU  --   --   --   --   --   --   --   --  2.6 2.8 3.6 3.8 5.3 5.6   AEOS  --   --   --   --   --   --   --   --  0.3 0.2 0.2 0.1 0.1 0.1   HGB 8.8* 8.6* 8.5*  6.5* 8.8* 8.5* 8.2*   < > 7.9* 8.0* 7.7* 8.2* 8.1* 9.2*   MCV 91 90  --  89 87 89 89   < > 82 82 81 81 80 82    200  --  255 216 217 205   < > 208 196 210 224 236 218    < > = values in this interval not displayed.       Metabolic Studies     Recent Labs   Lab Test 08/08/23  0522 08/07/23  1827 08/07/23  0626 08/06/23  1759 08/06/23  0612   * 132* 134* 133* 136   POTASSIUM 3.7 4.4 3.7 3.9 4.0   CHLORIDE 103 98 101 98 100   CO2 19* 19* 20* 20* 19*   BUN 80.9* 81.4* 84.0* 85.2* 83.7*   CR 3.72* 3.57* 3.56* 3.10* 3.48*   GFRESTIMATED 17* 18* 18* 21* 18*       Hepatic Studies    Recent Labs   Lab Test 08/03/23  0510 08/02/23  1806 07/27/23  1031 06/20/23  1550 05/29/23  0534 05/25/23  1059   BILITOTAL 0.3 0.4 0.4 0.4 0.6 0.6   ALKPHOS 99 102 108 135* 170* 140*   ALBUMIN 3.5 3.7 3.6 3.3* 3.7 3.1*   AST 50* 54* 64* 46* 69* 66*   ALT 34 36 39 44 50 52*       Microbiology:  Culture   Date Value Ref Range Status   06/20/2023 No Growth  Final   06/20/2023 No Growth  Final   05/27/2023 No Growth  Final   05/25/2023 No Growth  Final   05/25/2023 No Growth  Final   05/11/2023 3+ Pseudomonas aeruginosa (A)  Final   05/11/2023 2+ Pseudomonas aeruginosa (A)  Final   03/24/2023 3+ Pseudomonas aeruginosa (A)  Final   03/24/2023 3+ Pseudomonas aeruginosa (A)  Final   03/24/2023 3+ Pseudomonas aeruginosa (A)  Final   03/24/2023 1+ Staphylococcus warneri (A)  Final     Comment:     Susceptibilities not routinely done, refer to antibiogram to view typical susceptibility profiles   03/02/2023 1+ Pseudomonas aeruginosa (A)  Final   03/02/2023 1+ Pseudomonas aeruginosa (A)  Final   03/02/2023 1+ Staphylococcus aureus (A)  Corrected     Comment:     Susceptibility testing requested by Sunni Jimenez pharmacist ([id number]) to check slightly elevated vanco result. 3.8.23 at 1047  Susceptibility testing requested by Sunni Jimenez, pharmacist for [facility].    03/02/2023 1+ Staphylococcus aureus (A)  Final   03/02/2023 No anaerobic organisms  isolated  Final   12/11/2022 No anaerobic organisms isolated  Final   12/11/2022 No Growth  Final   12/11/2022 1+ Staphylococcus aureus (A)  Final     Comment:     Susceptibilities done on previous cultures   12/11/2022 1+ Staphylococcus aureus (A)  Final     Comment:     Susceptibilities done on previous cultures   12/08/2022 No Growth  Final   12/08/2022 2+ Staphylococcus aureus (A)  Final   12/08/2022 2+ Staphylococcus aureus (A)  Final   12/08/2022 No Growth  Final   10/18/2022 2+ Staphylococcus aureus (A)  Final   10/18/2022 2+ Staphylococcus aureus (A)  Final   09/20/2022 2+ Staphylococcus aureus (A)  Final   09/20/2022 1+ Normal freeman  Final   09/20/2022 No anaerobic organisms isolated  Final   02/10/2022 1+ Staphylococcus aureus (A)  Final   02/10/2022 1+ Cutibacterium (Propionibacterium) acnes (A)  Final     Comment:     Susceptibility testing of Cutibacterium (Propionibacterium) species is not done from this source. This organism is susceptible to penicillin and cefotaxime and most are susceptible to clindamycin.   08/17/2021 No Growth  Final   08/17/2021 No Growth  Final   08/17/2021 1+ Staphylococcus aureus (A)  Final   08/17/2021 No anaerobic organisms isolated  Final       Urine Studies    Recent Labs   Lab Test 06/20/23 2010 05/25/23  1641 05/20/23  2050 04/18/23  0548 02/16/21  0225   LEUKEST Negative Negative Negative Negative Negative   WBCU <1 1 <1 <1 0       Vancomycin Levels    Recent Labs   Lab Test 12/10/22  1948 02/16/20  0400 02/14/20  0331   VANCOMYCIN 11.3 18.5 16.2       Hepatitis B Testing   Recent Labs   Lab Test 04/27/23  0708 02/28/21  1155 02/12/20  0440   HBCAB Nonreactive  --  Nonreactive   HEPBANG Nonreactive Nonreactive Nonreactive     Hepatitis C Testing     Hepatitis C Antibody   Date Value Ref Range Status   04/27/2023 Nonreactive Nonreactive Final   02/12/2020 Nonreactive NR^Nonreactive Final     Comment:     Assay performance characteristics have not been established for  newborns,   infants, and children     02/08/2020 Nonreactive NR^Nonreactive Final     Comment:     Assay performance characteristics have not been established for newborns,   infants, and children       Respiratory Virus Testing    No results found for: RS, FLUAG         Imaging:      CT Abdomen/Pelvis (8/2/23)  1. Mild to moderate ascites with fluid in the pelvis, bilateral  paracolic gutter, perihepatic and perisplenic locations, new compared to CT from 5/20/23.  2. Cholelithiasis with nondistended gallbladder and mild  pericholecystic fluid, more likely secondary to ascites, if concern  for cholecystitis ultrasound may be performed.  3. Limited evaluation for appendix with appendix not definitively  identified, fluid in the paracolic gutter and along the right lower  quadrant precludes assessment for right lower quadrant inflammatory changes with noncontrast technique.  4. Small pleural effusions, left more than right.  5. Anterior rib fractures involving the ninth and 10th ribs, possibly  acute, not seen on prior CT from 5/20/2023     XR Abdomen (8/2/23)  Nonobstructive bowel gas pattern. Small stool burden.     CXR (7/27)  Clear lungs. Support devices in place.

## 2023-08-08 NOTE — PROCEDURES
The patient's HeartMate LVAD was interrogated 8/8/2023  * Speed 5900 rpm   * Pulsatility index 3.3   * Power 4.9 Driver   * Flow 5.3 L/minute   Fluid status: hypervolemic   Alarms were reviewed, and notable for few PI events.   The driveline exit site was inspected, dressing cdi.   All external components were inspected and showed no evidence of damage or malfunction, none replaced.   No changes to VAD settings made

## 2023-08-08 NOTE — CONSULTS
Palliative Care Consultation Note  [facility]      Patient: Eliseo Tanner  Date of Admission:  7/27/2023    Requesting Clinician / Team: Siena Aguiar NP  Reason for consult: Goals of care  Decisional support  Patient and family support       Recommendations & Counseling     GOALS OF CARE:   Life-prolonging without limits    Had discussion with Eliseo and wife about limited / gaurded prognosis as maximal medical therapy is no longer working for his end-stage RV faliure (details below). We presented our concerns that he may not make it to his daughters wedding in 1.5months and we are worried that he may only have weeks to live. He and his wife acknowledged this.  I encouraged Eliseo to think about where he wants to spend the time he has left, whether it be here in the hospital or back in [place] with his loved ones. We also discussed that we may now be in a window where we can get him home and we are not sure how quickly that window will close.  He and his wife are going to think about this and we will follow up with them tomorrow.     ADVANCE CARE PLANNING:  No health care directive on file. Per  informed consent policy, next of kin should be involved if patient becomes unable.  There is no POLST form on file, recommend to complete prior to DC.  Code status: Full Code    MEDICAL MANAGEMENT:   We are not managing sxs in this pt.    PSYCHOSOCIAL/SPIRITUAL SUPPORT:  Family - wife, kids  Friends   [place] [facility] (does not want  at this time)    Palliative Care will continue to follow. Thank you for the consult and allowing us to aid in the care of Eliseo Tanner.    These recommendations have been discussed with Dr. Carballo.    Wendy Peters MD  Securely message with Vocera (more info)  Text page via [facility] Paging/Directory     Patient seen and evaluated with Dr. Peters.   Agree with assessment and recommendations.    Medical Decision Making        MANAGEMENT DISCUSSED with the following over the past 24 hours: cards 2 PA, cards 2 RN VAD coordinator, Cards 2 SW   NOTE(S)/MEDICAL RECORDS REVIEWED over the past 24 hours: cardiology notes, nursing notse  Tests personally interpreted in the past 24 hours:      Tests REVIEWED in the past 24 hours:  - BMP  - CBC  SUPPLEMENTAL HISTORY, in addition to the patient's history, over the past 24 hours obtained from:   - Spouse or significant other  - daughter         Jaclyn Carballo MD / Palliative Medicine   Securely message with the Vocera Web Console (learn more here)   Text page via [employer] Paging/Directory       Assessment      Eliseo Tanner is a 68 year old male with a past medical history of HFrEF 2/2 NICM s/p HM3 LVAD in 2020 now with late, severe RV failure and stage IV cirrhosis, chronic MSSA & PSA driveline infection on chronic IV abx who presented to [facility] ED on 7/27/23 with c/o shortness of breath, fatigue, and weight gain c/w fluid overload due to end stage RV failure.  He was hypotensive in ED so started on dopamine in addition to dobutamine (DA later d/c'd). Now with C.diff. (long term antibiotic ( Cefepime) for Chronic LVAD driveline infection ( MSSA 11/2020 and P.aeruginosa (3/2/23) )     GOC CC 4/2023 conducted with palliative and cardiology, poor px and limited treatment options were communicated. Eliseo was very clear that he wants everything done to try and prolong his life and he named several events that he is looking forward to (family reunion on his 68th birthday in June and daughter getting  in September).  Rec'd HCD at that time, unsure if filled out.    This admission, Bon initially had some improvement in volume overload but this plateaued, now with rising creatinine and no further improvement in RV failure or volume overload, on dobutamine and bumex drips.    Today, the patient was seen for:  Goals of care  Decisional support  Patient and family support    Palliative Care Summary:    Met with Bon and his wife Chapis, with his daughter Mayra on the phone.   I introduced our role as an extra layer of support and how we help patients and families dealing with serious, potentially life-limiting illnesses. I explained the composition of the palliative care team.  Palliative care helps patients and families navigate their care while focusing on the whole person; providing emotional, social and spiritual support  Palliative care often assists with symptom management, information sharing about what to expect from the illness, available treatment options and what effect those options may have on the disease course, and provide effective communication and caring support.    Prognosis, Goals, & Planning:    Functional Status just prior to this current hospitalization:  Has had multiple / frequent hospitalizations for RV failure, approximately 1x/month since February 2023.    Prognosis, Goals, and/or Advance Care Planning:  We talked with Eliseo and his wife Chapis today about how we are not seeing an adequate response to maximal medication therapy.  He wanted to get to his daughter's wedding at the end of September, and we communicated that we were worried that this would not be possible.  We acknowledged that despite how hard we know he is trying, we are worried that we cannot get his heart strong enough to discharge and make it to his daughter's wedding in 1.5 months, and may only have weeks to live.  He and his wife acknowledged this.  I encouraged Eliseo to think about where he wants to spend the time he has left, whether it be here in the hospital or back in [place] with his loved ones.  He did mention previous to our meeting that he would like to get back to [place].  We also discussed that we are currently probably in a window where we can get him back there, but we are worried that if we wait too much longer he may continue to get sicker and we may not be able to get him home.  Eliseo wanted  some time to think about all of this, and we let him know that we would check back in with him tomorrow.    Code Status was addressed today:   No This will be discussed in future meetings.        Patient has decision-making capacity today for complex decisions:Intact            Coping, Meaning, & Spirituality:   Mood, coping, and/or meaning in the context of serious illness were addressed today: Yes He did not have any spiritual or Alevism needs at this time and did not want to talk with a .    Social:   Living situation:lives with significant other/spouse  Important relationships/caregivers:wife, daughters    Medications:  I have reviewed this patient's medication profile and medications from this hospitalization.   Notable medications:dobutamine gtt and bumex gtt      Physical Exam   Vital Signs with Ranges  Temp:  [97.3  F (36.3  C)-98.4  F (36.9  C)] 97.6  F (36.4  C)  Pulse:  [101-105] 101  Resp:  [16-22] 22  SpO2:  [92 %-96 %] 94 %  189 lbs 0 oz  Gen: awake, alert, sitting on edge of bed, somewhat dyspneic  Eyes: no scleral icterus  Pulm: appears somewhat dyspneic  Skin: cool, not mottled  Psych: answers direct questions, is looking at floor majority of time      Data reviewed:  Last Comprehensive Metabolic Panel:  Lab Results   Component Value Date     (L) 08/08/2023    POTASSIUM 3.7 08/08/2023    CHLORIDE 103 08/08/2023    CO2 19 (L) 08/08/2023    ANIONGAP 13 08/08/2023     (H) 08/08/2023    BUN 80.9 (H) 08/08/2023    CR 3.72 (H) 08/08/2023    GFRESTIMATED 17 (L) 08/08/2023    CALOS 8.2 (L) 08/08/2023     CBC RESULTS: Recent Labs   Lab Test 08/08/23  0522   WBC 7.9   RBC 3.39*   HGB 8.8*   HCT 30.7*   MCV 91   MCH 26.0*   MCHC 28.7*   RDW 19.7*

## 2023-08-09 NOTE — PROGRESS NOTES
Care Management Discharge Note    Discharge Date: 08/09/2023       Discharge Disposition: Home Infusion, Outpatient Infusion Services    Discharge Services:  Home w/ resumption of dobutamine and outpatient IV diuresis at local hospital. Anticipate transition to hospice by end of the week.     Discharge DME:  none     Discharge Transportation: family or friend will provide    Private pay costs discussed: Not applicable    Does the patient's insurance plan have a 3 day qualifying hospital stay waiver?  No    PAS Confirmation Code:  N/A   Patient/family educated on Medicare website which has current facility and service quality ratings:      Education Provided on the Discharge Plan:  Yes   Persons Notified of Discharge Plans: Pt, pt's wife, Veronique, dtr, Mayra, via phone, [facility]  Patient/Family in Agreement with the Plan:  Yes    Handoff Referral Completed:  N/A-writer follows outpatient as well    Additional Information:    Goals of care discussions continued today. Plan to get pt home w/ resumption dobutamine and outpatient IV diuresis. Anticipate in coming days pt will transition to home hospice. Pt's wife requested that initial information be sent to [facility] ([phone] f: [phone]). Writer spoke to Calixto at [facility] and explained situation; she will connect w/ pt's wife to determine timing of hospice admission in coming days in setting of pt still being on dobutamine, which typically needs to be discontinued. We have had thorough conversations with pt's wife that pt cannot be on any life sustaining medication while on hospice and dobutamine would be one of those medications. Pt also would not be able to do outpatient IV diuresis at [facility] hospital. Wife understands this.     Pt will discharge today w/ close follow-up with [facility], in coming days. No official admit to hospice orders at time of discharge. Pt's PCP will be the provider to sign hospice  orders and wife is aware. Pt's wife will transport.     POLST completed today with copy given to wife, copy sent to [facility] and hospice agency.       Cintia Perkins, LICSW

## 2023-08-09 NOTE — DISCHARGE SUMMARY
[facility]   Cardiology II Service / Advanced Heart Failure  Discharge Summary     Eliseo Tanner MRN# [id number]   YOB: 1955 Age: 68 year old     DATE OF ADMISSION:  7/27/2023  DATE OF DISCHARGE: 8/9/2023  ADMITTING PROVIDER: Daniel Gomes MD  DISCHARGE PROVIDER: Jessica Dutton MD and Siena Aguiar DNP, ANP-C   PRIMARY PROVIDER:  Jay Steele    ADMIT DIAGNOSES:   Acute on chronic biventricular heart failure 2/2 NICM  S/p heartmate 2 LVAD  Inotrope dependence, cardiogenic shock  Troponemia/demand ischemia due to HF   Chronic LVAD drive line on chronic IV suppressive abx  BERNARDA on CKD 2/2 cardiorenal syndrome  Hypervolemic hyponatremia  BEATRIZ    DISCHARGE DIAGNOSES:   End stage iventricular heart failure 2/2 NICM  S/p heartmate 2 LVAD  Inotrope dependence, cardiogenic shock  Chronic LVAD drive line on chronic IV suppressive abx  Worsening CKD stage IV 2/2 cardiorenal syndrome  BEATRIZ    FOLLOW-UP:  [] none, patient's wife will contact hospice agency when family is ready to transition    PENDING RESULTS:   [] none    HPI: Please see the detailed H & P by Milton Gomes and Cosme from 7/27/2023. 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 He also notes 20 lb weight gain since last hospital discharge for heart failure on 6/29/23. Eliseo has frequent admissions for RV failure and most recently, was discharged home on IV dobutamine to help with diuresis and symptoms. He reports mostly retaining fluid in his abdomen but notes his lower extremities are slightly more swollen.  He also endorses shortness of breath and weakness. Patient denies LVAD alarms. He has no change to drive line drainage. He denies recent fevers, chills, cough, nausea, vomiting, pain.  Patient has been taking his medications as  prescribed, no issues with dobutamine pump at home.      In the ED:   - troponin 174 (at baseline since April 2023)  - INR 2.6  - WBC 11.7 from 10.1 on 6/26  - Hgb 8.1 (near baseline)   - Na 126 from 135  - K 4.6  - Cr 3.4 from baseline ~2.3  - CXR showed clear lungs    HOSPITAL COURSE:   Patient presented with ~20 lb weight gain (199 lb, EDW ~180 lb), elevated NT pro BNP, BERNARDA on CKD, hyponatremia all c/w hypervolemia due to severe BiV heart failure. Trop mildly elevated, per baseline, due to demand ischemia with HF. Developed hypotension in the ED MAP mid 50s so briefly started on dopamine 2.5 mcg/kg/min.  Blood pressures improved so dopamine stopped and his dobutamine was increased to 5 mcg/kg/min.  Attempts were made at diuresis.  Initially lost about 10 pounds of fluid despite maximum dose of bumetanide drip and high dose of IV Diuril and Diamox we are unable to diurese any further.  Renal function also worsened, creatinine 3.8 on day of discharge.  Multiple goals of care conversations were had and care conference including palliative care, patient's wife, and daughter Jonny.  Decision made to discharge patient on dobutamine for now with plans to enroll in home hospice services in the coming days.  Will continue high-dose torsemide and potassium for now.  Will plan to finish course of vancomycin at palliation.  His chronic IV cefepime was changed to oral antibiotic for now.  ICD turned off prior to discharge and POLST filled out. Patient was discharged with a supply of as needed Dilaudid liquid for an breathlessness and Ativan for tremor, nausea.    # Acute on chronic biventricular systolic heart failure secondary to NICM (LVEF 15-20% per TTE 9/2022)  # s/p HM3 LVAD (2/18/20, DT) c/b late RV failure  # Inotrope dependence/cardiogenic shock  # Troponinemia, demand ischemia due to HF  Stage D, NYHA Class IV. Multiple hospitalizations for decompensated RV failure.   [facility] 6/21/23 RA 15, mPA 26, PCW 16, CI 2.57 at LVAD  speed 5800rpm.     - Fluid status: discharge on torsemide 100 mg tid  - RV support/inotropy: dobutamine 5mcg/kg/min, plans to stop dobutamine when enrolling in hospice (or allow supply to run out)  - ACEi/ARB/ARNi: deferred due to renal dysfunction, d/c'ed pta hydralazine    - BB:  contraindicated due to RV dysfunction  - Aldosterone antagonist: contraindicated due to renal dysfunction  - SGLT2i: Jardiance d/c'ed due to renal dysfunction  - SCD ppx:  ICD - shock therapies turned OFF 8/9  - Antiplatelet: not on PTA, due to past epistaxis  - Anticoagulation: warfarin goal INR 1.7-2.3 (due to epistaxis). INR today 2.4, continue warfarin for now  - MAPs: 70s-80s  - LDH:  baseline 300s   - MEMS: monitor daily     # Chronic LVAD drive line infection, MSSA and PSA  - pta IV cefepime changed to PO cefdinir, ok to stop when on hospice       # BERNARDA on CKD, cardiorenal  Cr 3.4 on admission, baseline is mid 2s. BERNARDA secondary to CRS/hypervolemia. Briefly improved with diuresis, but now back to mid 3s and rising     # NSVT  - d/c'ed amiodarone 200mg daily     # Hypervolemic hyponatremia, chronic, resolved  Na 126 on admission, hypervolemic hyponatremia in setting of HF.     # Liver cirrhosis with portal hypertension  # Ascites   Liver biopsy during recent admission revealed cirrhosis - likely multifactorial (remote EtOH misuse, NAFLD, chronic congestive hepatopathy from RV failure).  No evidence of decompensation. Hepatology consulted 5/30, no concern for HE though at risk for.Deferred diagnostic paracentesis given risks vs benefits. Prn miralax.      # BEATRIZ  Worsening anxiety lately per Chapis, wife. Previously on paxil but was weaned off in May (when he had ecephalopthy) and started on seroquel. Wife requesting an alterative and patient agreeable.Started on paxil this admission but given decline in condition, will not continue at discharge. prn atrax  for itching, anxiety.     # C. Diff. +on 8/6. He had a case of c. Diff about 3-4  "months ago,  but doesn't recall infections prior to that. ID consulted, started on PO vanco qid x10d then BID. Consider d/c if enrollment on hospice.      OTHER/CHRONIC CONDITIONS:   Hypothyroidism:  7/27 TSH 7.5 free T4 1.4, d/c'ed levothyroxine 100mcg qAM.   GERD:  PTA pantoprazole   Normocytic anemia/BELA:  Baseline 8-9, stable. D/c'ed ferrous sulfate 325mg daily.   BPH:  PTA finasteride 5mg daily and tamsulosin 0.8mg qPM for now  DMII:  A1c 6.3 , LDSSI, hypoglycemia protocol while inpatient, diet controlled as outpatient   Gout:  d/c'ed allopurinol 200mg daily  h/o HIT  ABHINAV:  pta CPAP    Siena Aguiar DNP, ANP-C  Advanced Heart Failure/Cardiology 2 Nurse Practitioner  8/9/2023  Pager: [phone]    PHYSICAL EXAM:  Blood pressure (!) 82/60, pulse 96, temperature 97.6  F (36.4  C), temperature source Oral, resp. rate 18, height 1.702 m (5' 7\"), weight 84.1 kg (185 lb 6.5 oz), SpO2 93 %.    GENERAL: Appears comfortable, fatigued/lethargic  HEENT: Eye symmetrical and without discharge or icterus bilaterally. Mucous membranes moist and without lesions.  NECK: Supple, JVD to angle of jaw.   CV: +VAD hum.   RESPIRATORY: Respirations regular, even, and unlabored. Lungs dim/  GI: Soft, obese and moderately distended with normoactive bowel sounds present in all quadrants. No tenderness, rebound, guarding.   EXTREMITIES: No peripheral edema. Non pulsatile per baseline.   NEUROLOGIC: Fatigued, oriented. No focal deficits.   MUSCULOSKELETAL: No joint swelling or tenderness.   SKIN: No jaundice. No rashes or lesions.     LABS:   Last CBC:   Recent Labs   Lab Test 08/09/23  0400   WBC 9.2   RBC 3.33*   HGB 8.9*   HCT 30.2*   MCV 91   MCH 26.7   MCHC 29.5*   RDW 19.9*          Last CMP:  Recent Labs   Lab Test 08/09/23  0832 08/09/23  0400 08/03/23  0844 08/03/23  0510   NA  --  136   < > 136   POTASSIUM  --  4.4   < > 3.2*   CHLORIDE  --  103   < > 100   CALOS  --  8.0*   < > 8.6*   CO2  --  19*   < > 22   BUN  --  77.8*   " < > 91.2*   CR  --  3.80*   < > 3.43*   * 142*   < > 132*   AST  --   --   --  50*   ALT  --   --   --  34   BILITOTAL  --   --   --  0.3   ALBUMIN  --   --   --  3.5   PROTTOTAL  --   --   --  7.0   ALKPHOS  --   --   --  99    < > = values in this interval not displayed.       IMAGING:  Results for orders placed or performed during the hospital encounter of 07/27/23   XR Chest 2 Views    Narrative    Exam: XR CHEST 2 VIEWS, 7/27/2023 10:57 AM    Indication: SOB    Comparison: 6/20/2023    Findings:   PA and lateral views of the chest. Median sternotomy. Left chest wall  cardiac device with single lead projecting over the right ventricle.  Right PICC line tip projects near the high right atrium. LVAD in  place. CaridoMEMS device.    Unchanged cardiac silhouette. No pneumothorax, effusion, or focal  opacity.      Impression    Impression: Clear lungs. Support devices in place.    I have personally reviewed the examination and initial interpretation  and I agree with the findings.    QUIQUE NICHOLS MD         SYSTEM ID:  [id number]   XR Abdomen Port 1 View    Narrative    Exam: X-ray abdomen portable one view, 8/2/2023 6:00 AM    History: abd cramping, cirrhosis w/ known small volume ascites. Stool  burden? Worsening fluid collection?    Comparison: CT AP 5/20/2023, chest x-ray 7/27/2023    Findings:   Portable AP supine view of the abdomen in 2 images. Sternotomy wires.  Cardiac lead. LVAD. Hyperdense focus over the pelvis.    Nonobstructive bowel gas pattern. No pneumoperitoneum. No portal  venous gas. No pneumatosis. No acute kimberlyn opacity.      Impression    Impression:   Nonobstructive bowel gas pattern. Small stool burden.       I have personally reviewed the examination and initial interpretation  and I agree with the findings.    KRYSTEN SOLIZ MD         SYSTEM ID:  [id number]   CT Abdomen Pelvis w/o Contrast    Narrative    EXAMINATION: CT ABDOMEN PELVIS W/O CONTRAST, 8/2/2023 2:46 PM    TECHNIQUE:   Helical CT images from the thoracic inlet through the  symphysis pubis were obtained  without contrast.   COMPARISON: CT abdomen 5/20/2023    HISTORY: acute R>L lower quad pain    FINDINGS:    Lower chest: Partially visualized left ventricular assist device and  drive line. Mild cardiomegaly. Small left and trace right pleural  effusions. Mild left basilar atelectasis.    Limited evaluation of abdominal parenchymal organs due to noncontrast  technique.    Liver: Dysmorphic liver with a nodular contour. No focal ulceration.    Biliary System: Calcified gallstone. Trace pericholecystic fluid.  Nondistended gallbladder    Spleen: Borderline enlarged measuring 13.1 cm    Pancreas: Diffusely atrophic.    Adrenal glands: Mild nodular thickening of the left adrenal gland  similar to prior.    Kidneys: No hydronephrosis or definite mass on noncontrast images    Gastrointestinal: Intraluminal contrast is noted within the stomach,  duodenum, and proximal small bowel. Metallic density  1.1 cm is noted  within the lumen of the distal sigmoid colon compatible with  hemostatic clip.. Normal course and caliber large and small bowel.  Appendix is not definitely identified.    Peritoneum: Mild/moderate volume ascites within the perisplenic and  perihepatic upper abdomen, tracking down bilateral pericolic gutters,  and layering in the dependent portions of the pelvis.    Lymph nodes: No lymphadenopathy    Vasculature: Limited noncontrast evaluation. Scattered atherosclerotic  calcifications of the abdominal aorta and major branching vessels.    Reproductive Organs: Within normal limits    Bladder: Uniform urinary bladder wall thickening likely related to  underdistention    Abdominal wall: Small fat-containing umbilical hernia. Punctate  subcutaneous calcifications overlying bilateral gluteal regions.    Musculoskeletal: Moderate/severe multilevel degenerative changes of  the thoracolumbar spine. Mild step wise retrolisthesis at  presumed  L3-L4 and L4-L5 with near complete intervertebral disc space loss and  mild scoliotic curvature of the lower lumbar spine..      Impression    IMPRESSION:    1. Mild to moderate ascites with fluid in the pelvis, bilateral  paracolic gutter, perihepatic and perisplenic locations, new compared  to CT from 23.  2. Cholelithiasis with nondistended gallbladder and mild  pericholecystic fluid, more likely secondary to ascites, if concern  for cholecystitis ultrasound may be performed.  3. Limited evaluation for appendix with appendix not definitively  identified, fluid in the paracolic gutter and along the right lower  quadrant precludes assessment for right lower quadrant inflammatory  changes with noncontrast technique.  4. Small pleural effusions, left more than right.  5. Anterior rib fractures involving the ninth and 10th ribs, possibly  acute, not seen on prior CT from 2023..        I have personally reviewed the examination and initial interpretation  and I agree with the findings.    TRINIDAD COMER MD         SYSTEM ID:  [id number]   Echo Complete     Value    LVEF  30-35%    Narrative    [id number]  [facility]  [id number]  [id number]^RENETTA^DOMO^MADAY                                                                       Version 2     [employer],Ethel  Echocardiography Laboratory  [address] [place] [address]     Name: WOLFGANG LEACH  MRN: [id number]  : 1955  Study Date: 2023 10:37 AM  Age: 68 yrs  Gender: Male  [place] [facility] [place] For Study: Heart Failure  Ordering Physician: DOMO JACKSON  Performed By: Meredith Lomas     BSA: 2.0 m2  Height: 67 in  Weight: 194 lb  HR: 93  ______________________________________________________________________________  Procedure  LVAD Echocardiogram with two-dimensional, color and spectral Doppler  performed.  ______________________________________________________________________________  Interpretation  Summary  LVAD cannula was seen in the expected anatomic position in the LV apex.  HM3 at 5900RPM.  LVIDd 60mm.  Septum normal.  Aortic valve open partially. Mild AI.  Normal flow velocities.  The visual ejection fraction is 30-35%.  Global right ventricular function is mildly to moderately reduced.  The inferior vena cava is normal.  ______________________________________________________________________________  Left Ventricle  The visual ejection fraction is 30-35%. LVAD cannula was seen in the expected  anatomic position in the LV apex. HM3 at 5900RPM.  LVIDd 60mm.  Septum normal.  Aortic valve open partially. Mild AI.  Normal flow velocities.     Right Ventricle  Global right ventricular function is mildly to moderately reduced.     Atria  The atria cannot be assessed.     Aortic Valve  Mild to moderate aortic insufficiency is present.     Tricuspid Valve  Mild tricuspid insufficiency is present. The right ventricular systolic  pressure is approximated at 18.0 mmHg plus the right atrial pressure.     Pulmonic Valve  The pulmonic valve is normal. Trace to mild pulmonic insufficiency is present.     Vessels  The inferior vena cava is normal.     Pericardium  No pericardial effusion is present.     ______________________________________________________________________________  Doppler Measurements & Calculations  PA acc time: 0.14 sec  TR max saumya: 212.0 cm/sec  TR max P.0 mmHg     ______________________________________________________________________________  Report approved by: Graciela Tomlinson 2023 02:59 PM         Cardiac Device Check - Inpatient     Value    Date Time Interrogation Session [id number]    Implantable Pulse Generator  Medtronic    Implantable Pulse Generator Model [id number] Visia AF MRI VR    Implantable Pulse Generator Serial Number [id number]    Type Interrogation Session In Clinic    Clinic Name [facility]    Implantable Pulse Generator Type  Defibrillator    Implantable Pulse Generator Implant Date 20200316    Implantable Lead  Medtronic    Implantable Lead Model 6935M Sprint Quattro Secure S MRI SureScan    Implantable Lead Serial Number [id number]    Implantable Lead Implant Date 20200316    Implantable Lead Polarity Type Tripolar Lead    Implantable Lead Location Detail 1 UNKNOWN    Implantable Lead Special Function 62cm length    Implantable Lead Location Right Ventricle    Glenn Setting Mode (NBG Code) VVIR    Glenn Setting Lower Rate Limit 60    Glenn Setting Maximum Sensor Rate 115    Glenn Setting Hysterisis Rate DISABLED    Lead Channel Setting Sensing Polarity Bipolar    Lead Channel Setting Sensing Anode Location Right Ventricle    Lead Channel Setting Sensing Anode Terminal Ring    Lead Channel Setting Sensing Cathode Location Right Ventricle    Lead Channel Setting Sensing Cathode Terminal Tip    Lead Channel Setting Sensing Sensitivity 0.3    Lead Channel Setting Pacing Polarity Bipolar    Lead Channel Setting Pacing Anode Location Right Ventricle    Lead Channel Setting Pacing Anode Terminal Ring    Lead Channel Setting Sensing Cathode Location Right Ventricle    Lead Channel Setting Sensing Cathode Terminal Tip    Lead Channel Setting Pacing Pulse Width 0.4    Lead Channel Setting Pacing Amplitude 2    Lead Channel Setting Pacing Capture Mode Adaptive    Zone Setting Type Category VF    Zone Setting Detection Interval 270    Zone Setting Detection Beats Numerator 30    Zone Setting Detection Beats Denominator 40    Zone Setting Type Category VT    Zone Setting Detection Interval Blank    Zone Setting Type Category VT    Zone Setting Detection Interval 460    Zone Setting Type Category VT    Zone Setting Detection Interval 500    Zone Setting Type Category ATRIAL_FIBRILLATION    Zone Setting Type Category AT/AF    Lead Channel Impedance Value 399    Lead Channel Impedance Value 304    Lead Channel Sensing Intrinsic Amplitude  8.625    Lead Channel Pacing Threshold Amplitude 0.5    Lead Channel Pacing Threshold Pulse Width 0.4    Battery Date Time of Measurements 51481906149188    Battery Status OK    Battery RRT Trigger 2.727    Battery Remaining Longevity 101    Battery Voltage 2.97    Capacitor Charge Type Reformation    Capacitor Last Charge Date Time 20230724021958    Capacitor Charge Time 3.753    Capacitor Charge Energy 18    Glenn Statistic Date Time Start 03371121616862    Glenn Statistic Date Time End 20230727142057    Glenn Statistic RV Percent Paced 0.1    Atrial Tachy Statistic Date Time Start 48763568136724    Atrial Tachy Statistic Date Time End 20230727142057    Atrial Tachy Statistic AT/AF [place] Percent 0    Therapy Statistic Recent Shocks Delivered 0    Therapy Statistic Recent Shocks Aborted 0    Therapy Statistic Recent ATP Delivered 0    Therapy Statistic Recent Date Time Start 64339153239496    Therapy Statistic Recent Date Time End 20230727142057    Therapy Statistic Total Shocks Delivered 0    Therapy Statistic Total Shocks Aborted 0    Therapy Statistic Total ATP Delivered 0    Therapy Statistic Total  Date Time Start 49860647487608    Therapy Statistic Total  Date Time End 85729362093501    Episode Statistic Recent Count 0    Episode Statistic Type Category AT/AF    Episode Statistic Recent Count 0    Episode Statistic Type Category SVT    Episode Statistic Recent Count 0    Episode Statistic Type Category VT    Episode Statistic Recent Count 0    Episode Statistic Type Category VF    Episode Statistic Recent Count 0    Episode Statistic Type Category VT    Episode Statistic Recent Count 0    Episode Statistic Type Category VT    Episode Statistic Recent Count 0    Episode Statistic Type Category VT    Episode Statistic Recent Date Time Start 58362093232184    Episode Statistic Recent Date Time End 72521425726066    Episode Statistic Recent Date Time Start 84683117434822    Episode Statistic Recent Date Time End  80656307467024    Episode Statistic Recent Date Time Start 33876694635164    Episode Statistic Recent Date Time End 97660337732212    Episode Statistic Recent Date Time Start 84099052684086    Episode Statistic Recent Date Time End 49909504910695    Episode Statistic Recent Date Time Start 20970575763296    Episode Statistic Recent Date Time End 75354345770076    Episode Statistic Recent Date Time Start 36227116505116    Episode Statistic Recent Date Time End 73550251438927    Episode Statistic Recent Date Time Start 45520906156084    Episode Statistic Recent Date Time End 61373648968149    Episode Statistic Total Count 259    Episode Statistic Type Category AT/AF    Episode Statistic Total Count 0    Episode Statistic Type Category SVT    Episode Statistic Total Count 0    Episode Statistic Type Category VT    Episode Statistic Total Count 0    Episode Statistic Type Category VF    Episode Statistic Total Count 0    Episode Statistic Type Category VT    Episode Statistic Total Count 0    Episode Statistic Type Category VT    Episode Statistic Total Count 2    Episode Statistic Type Category VT    Episode Statistic Total Date Time Start 14523081055564    Episode Statistic Total Date Time End 24613036634133    Episode Statistic Total Date Time Start 76699527650027    Episode Statistic Total Date Time End 81654943628402    Episode Statistic Total Date Time Start 89237025896705    Episode Statistic Total Date Time End 88709989156820    Episode Statistic Total Date Time Start 18412531215155    Episode Statistic Total Date Time End 58096684265691    Episode Statistic Total Date Time Start 27217990789891    Episode Statistic Total Date Time End 09585680763464    Episode Statistic Total Date Time Start 81311026035123    Episode Statistic Total Date Time End 28527847021129    Episode Statistic Total Date Time Start 36712743474115    Episode Statistic Total Date Time End 22004237931126    Narrative    Patient seen in the  [facility] ER for evaluation and iterative programming of   their ICD per MD orders.     Device: Medtronic [id number] Visia AF MRI VR  Normal device function.   Mode: VVIR  bpm  : <0.1%  Intrinsic Rhythm:  bpm  Thoracic Impedance: Suggests possible intrathoracic fluid accumulation.  Short V-V intervals: 0  Lead Trends Appear Stable.   Estimated battery longevity to RRT = 8.5 years. Battery voltage = 2.97 V.   No Arrhythmias Recorded  Setting Changes: None  Plan: Device follow-up every 3 months.  ISMAEL Montiel RN    Single lead ICD    I have reviewed and interpreted the device interrogation, settings,   programming and nurse's summary. The device is functioning within normal   device parameters. I agree with the current findings, assessment and plan.     Cardiac Device Check - Inpatient     Value    Date Time Interrogation Session [id number]    Implantable Pulse Generator  Medtronic    Implantable Pulse Generator Model [id number] Visia AF MRI VR    Implantable Pulse Generator Serial Number [id number]    Type Interrogation Session In Clinic    Clinic Name [facility]    Implantable Pulse Generator Type Defibrillator    Implantable Pulse Generator Implant Date 20200316    Implantable Lead  Medtronic    Implantable Lead Model 6935M Sprint Quattro Secure S MRI SureScan    Implantable Lead Serial Number [id number]    Implantable Lead Implant Date 20200316    Implantable Lead Polarity Type Tripolar Lead    Implantable Lead Location Detail 1 UNKNOWN    Implantable Lead Special Function 62cm length    Implantable Lead Location Right Ventricle    Glenn Setting Mode (NBG Code) VVIR    Glenn Setting Lower Rate Limit 30    Glenn Setting Maximum Sensor Rate 115    Glenn Setting Hysterisis Rate DISABLED    Lead Channel Setting Sensing Polarity Bipolar    Lead Channel Setting Sensing Anode Location Right Ventricle    Lead Channel Setting Sensing Anode Terminal Ring    Lead Channel  Setting Sensing Cathode Location Right Ventricle    Lead Channel Setting Sensing Cathode Terminal Tip    Lead Channel Setting Sensing Sensitivity 0.3    Lead Channel Setting Pacing Polarity Bipolar    Lead Channel Setting Pacing Anode Location Right Ventricle    Lead Channel Setting Pacing Anode Terminal Ring    Lead Channel Setting Sensing Cathode Location Right Ventricle    Lead Channel Setting Sensing Cathode Terminal Tip    Lead Channel Setting Pacing Pulse Width 0.4    Lead Channel Setting Pacing Amplitude 2    Lead Channel Setting Pacing Capture Mode Adaptive    Zone Setting Type Category VF    Zone Setting Detection Interval 270    Zone Setting Detection Beats Numerator 30    Zone Setting Detection Beats Denominator 40    Zone Setting Type Category VT    Zone Setting Detection Interval Blank    Zone Setting Type Category VT    Zone Setting Detection Interval 460    Zone Setting Type Category VT    Zone Setting Detection Interval 500    Zone Setting Type Category ATRIAL_FIBRILLATION    Zone Setting Type Category AT/AF    Lead Channel Impedance Value 361    Lead Channel Impedance Value 304    Lead Channel Sensing Intrinsic Amplitude 8.25    Lead Channel Pacing Threshold Amplitude 0.5    Lead Channel Pacing Threshold Pulse Width 0.4    Battery Date Time of Measurements 23599653147325    Battery Status OK    Battery RRT Trigger 2.727    Battery Remaining Longevity 99    Battery Voltage 2.99    Capacitor Charge Type Reformation    Capacitor Last Charge Date Time [id number]    Capacitor Charge Time 3.753    Capacitor Charge Energy 18    Glenn Statistic Date Time Start 33776239437381    Glenn Statistic Date Time End 19745841539760    Glenn Statistic RV Percent Paced 0    Atrial Tachy Statistic Date Time Start 23350207434073    Atrial Tachy Statistic Date Time End 50372842153358    Atrial Tachy Statistic AT/AF [place] Percent 0    Therapy Statistic Recent Shocks Delivered 0    Therapy Statistic Recent Shocks  Aborted 0    Therapy Statistic Recent ATP Delivered 0    Therapy Statistic Recent Date Time Start 46919129136266    Therapy Statistic Recent Date Time End 47532813501150    Therapy Statistic Total Shocks Delivered 0    Therapy Statistic Total Shocks Aborted 0    Therapy Statistic Total ATP Delivered 0    Therapy Statistic Total  Date Time Start 36838269898089    Therapy Statistic Total  Date Time End 84723203550562    Episode Statistic Recent Count 0    Episode Statistic Type Category AT/AF    Episode Statistic Recent Count 0    Episode Statistic Type Category SVT    Episode Statistic Recent Count 0    Episode Statistic Type Category VT    Episode Statistic Recent Count 0    Episode Statistic Type Category VF    Episode Statistic Recent Count 0    Episode Statistic Type Category VT    Episode Statistic Recent Count 0    Episode Statistic Type Category VT    Episode Statistic Recent Count 0    Episode Statistic Type Category VT    Episode Statistic Recent Date Time Start 13762555415603    Episode Statistic Recent Date Time End 24440338639626    Episode Statistic Recent Date Time Start 59196683545161    Episode Statistic Recent Date Time End 76644312991787    Episode Statistic Recent Date Time Start 77512554387676    Episode Statistic Recent Date Time End 11309319377327    Episode Statistic Recent Date Time Start 73969001291497    Episode Statistic Recent Date Time End 00713683179694    Episode Statistic Recent Date Time Start 20807847483339    Episode Statistic Recent Date Time End 04060202978765    Episode Statistic Recent Date Time Start 75226652370289    Episode Statistic Recent Date Time End 55489677377999    Episode Statistic Recent Date Time Start 05261783514921    Episode Statistic Recent Date Time End 22556352478262    Episode Statistic Total Count 259    Episode Statistic Type Category AT/AF    Episode Statistic Total Count 0    Episode Statistic Type Category SVT    Episode Statistic Total Count 0     Episode Statistic Type Category VT    Episode Statistic Total Count 0    Episode Statistic Type Category VF    Episode Statistic Total Count 0    Episode Statistic Type Category VT    Episode Statistic Total Count 0    Episode Statistic Type Category VT    Episode Statistic Total Count 2    Episode Statistic Type Category VT    Episode Statistic Total Date Time Start 20200316145425    Episode Statistic Total Date Time End 46604688623984    Episode Statistic Total Date Time Start 20200316145425    Episode Statistic Total Date Time End 20230809083543    Episode Statistic Total Date Time Start 20200316145425    Episode Statistic Total Date Time End 20230809083543    Episode Statistic Total Date Time Start 20200316145425    Episode Statistic Total Date Time End 20230809083543    Episode Statistic Total Date Time Start 20200316145425    Episode Statistic Total Date Time End 20230809083543    Episode Statistic Total Date Time Start 20200316145425    Episode Statistic Total Date Time End 20230809083543    Episode Statistic Total Date Time Start 20200316145425    Episode Statistic Total Date Time End 20230809083543    Narrative    Patient seen on [facility] 6C for evaluation and iterative programming of their ICD per MD orders. ICD TACHY THERAPIES TURNED OFF (per MD orders)- PLAN TO D/C ON HOSPICE TODAY.    Device: [employer] [id number] Visia AF MRI VR  Normal device function.   Mode: VVIR 30 bpm  : <0.1%  Intrinsic Rhythm: Regular VS @ 105 bpm  Thoracic Impedance: Suggests possible intrathoracic fluid accumulation.  Short V-V intervals: 0  Lead Trends Appear Stable.   Estimated battery longevity to RRT = 8.2 years. Battery voltage = 2.99 V.   No Arrhythmias recorded since admission  Setting Changes: Tachy therapies turned off and pacing changed from VVIR 60 bpm to VVIR 30 bpm, per MD orders for discharge with hospice care.    Plan: Device follow-up every 3 months.  ISMAEL Montiel RN    Single lead ICD    I have reviewed and  interpreted the device interrogation, settings, programming and nurse's summary. The device is functioning within normal device parameters. I agree with the current findings, assessment and plan.     *Note: Due to a large number of results and/or encounters for the requested time period, some results have not been displayed. A complete set of results can be found in Results Review.       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)      cefdinir (OMNICEF) 300 MG capsule Take 1 capsule (300 mg) by mouth daily  Qty: 30 capsule, Refills: 0    Associated Diagnoses: Wound infection      HYDROmorphone, STANDARD CONC, (DILAUDID) 1 MG/ML oral solution Take 1-2 mLs (1-2 mg) by mouth every 4 hours as needed for moderate to severe pain  Qty: 180 mL, Refills: 0    Associated Diagnoses: 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); Anxiety      hydrOXYzine (ATARAX) 25 MG tablet Take 1-2 tablets (25-50 mg) by mouth every 6 hours as needed for anxiety or itching  Qty: 120 tablet, Refills: 0    Associated Diagnoses: Anxiety      LORazepam (ATIVAN) 0.5 MG tablet Take 1-2 tablets (0.5-1 mg) by mouth every 6 hours as needed for anxiety, agitation, muscle spasms, nausea or pain  Qty: 50 tablet, Refills: 0    Associated Diagnoses: Anxiety; Shortness of breath      polyethylene glycol (MIRALAX) 17 GM/Dose powder Take 17 g by mouth 2 times daily as needed for constipation  Qty: 510 g, Refills: 0    Associated Diagnoses: Other constipation      vancomycin (VANCOCIN) 125 MG capsule Take 1 capsule (125 mg) by  mouth 4 times daily for 5 days, THEN 1 capsule (125 mg) 2 times daily for 5 days.  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  Qty: 30 tablet, Refills: 0    Associated Diagnoses: Voiding difficulty      hydrochlorothiazide (HYDRODIURIL) 25 MG tablet Take 3 tablets (75 mg) by mouth daily  Qty: 270 tablet, Refills: 3    Associated Diagnoses: LVAD (left ventricular assist device) present (H)      metolazone (ZAROXOLYN) 2.5 MG tablet Take 1 tablet (2.5 mg) by mouth daily as needed (for weight gain as directed by the VAD team)  Qty: 5 tablet, Refills: 1    Associated Diagnoses: Chronic systolic congestive heart failure (H)      pantoprazole (PROTONIX) 40 MG EC tablet Take 1 tablet (40 mg) by mouth 2 times daily (before meals)      potassium chloride ER (KLOR-CON M) 20 MEQ CR tablet Take 100mEq in the morning, Take 100 mEq in the afternoon, Take 100 mEq in the PM  Qty: 774 tablet, Refills: 3    Associated Diagnoses: 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); LVAD (left ventricular assist device) present (H)      QUEtiapine (SEROQUEL) 25 MG tablet Take 0.5 tablets (12.5 mg)  by mouth At Bedtime  Qty: 30 tablet, Refills: 3    Associated Diagnoses: Other insomnia      tamsulosin (FLOMAX) 0.4 MG capsule Take 0.8 mg by mouth every evening This med helps with urinary retention  Qty: 30 capsule, Refills: 0    Associated Diagnoses: Voiding difficulty      torsemide (DEMADEX) 100 MG tablet Take 1 tablet (100 mg) by mouth 3 times daily  Qty: 270 tablet, Refills: 3    Associated Diagnoses: Acute on chronic congestive heart failure, unspecified heart failure type (H); LVAD (left ventricular assist device) present (H)           STOP taking these medications       allopurinol (ZYLOPRIM) 100 MG tablet Comments:   Reason for Stopping:         amiodarone (PACERONE) 200 MG tablet Comments:   Reason for Stopping:         amLODIPine (NORVASC) 5 MG tablet Comments:   Reason for Stopping:         ceFEPIme (MAXIPIME) 2 g vial Comments:   Reason for Stopping:         folic acid-vit B6-vit B12 (FOLGARD) 0.8-10-0.115 MG TABS per tablet Comments:   Reason for Stopping:         hydrALAZINE (APRESOLINE) 25 MG tablet Comments:   Reason for Stopping:         levothyroxine (SYNTHROID/LEVOTHROID) 100 MCG tablet Comments:   Reason for Stopping:         magnesium oxide (MAG-OX) 400 MG tablet Comments:   Reason for Stopping:               DISCHARGE DISPOSITION: Eliseo Tanner will discharge to home with plans for hospice enrollment due to worsening condition.     DISCHARGE INSTRUCTIONS:  Discharge Procedure Orders   MISCELLANEOUS DME SUPPLY OR ACCESSORY, NOT OTHERWISE SPECIFIED   Order Comments: Bumex 5 mg intravenously once daily on Mondays, Wednesday, and Friday     Home Infusion Referral   Referral Priority: Routine: Next available opening Referral Type: Consultation   Number of Visits Requested: 1     Reason for your hospital stay   Order Comments: Fluid overload due to end stage right sided heart failure     Activity   Order Comments: Your activity upon discharge: activity as tolerated     Order Specific Question  Answer Comments   Is discharge order? Yes      Follow Up and recommended labs and tests   Order Comments: Follow up with hospice agency when your family elects to enroll in hospice     Resume Home Care Services     Follow Up and recommended labs and tests   Order Comments: [facility]   Phone: [phone]   Fax: [phone]     For weekly PICC line dressing changes.  IV bumex as ordered.     Diet   Order Comments: Follow this diet upon discharge: Orders Placed This Encounter      Fluid restriction 1800 ML FLUID      Snacks/Supplements Adult: Ensure Enlive; Between Meals      2 Gram Sodium Diet     Order Specific Question Answer Comments   Is discharge order? Yes        75 minutes spent in discharge, including >50% in counseling and coordination of care, medication review and plan of care recommended on follow up. Questions were answered, patient agrees to plan.

## 2023-08-09 NOTE — PROCEDURES
The patient's HeartMate LVAD was interrogated 8/9/2023  * Speed 5900 rpm   * Pulsatility index 3.1-3.6  * Power 4.9 Driver   * Flow 5.3 L/minute   Fluid status: hypervolemic   Alarms were reviewed, and notable for few PI events.   The driveline exit site was inspected, dressing cdi.   All external components were inspected and showed no evidence of damage or malfunction, none replaced.   No changes to VAD settings made

## 2023-08-09 NOTE — PROGRESS NOTES
Joined pt, wife, SW, Cards LISA and palliative to discuss plan of care.  Lots of discussions had regarding pt's options.  Pt and family ultimately decided that going home is their priority.  They have requested that this happen asap and that they could be connected with hospice upon returning home.  Supportive listening and emotional support offered.  Will connect with pt and wife upon returning to home.

## 2023-08-09 NOTE — PLAN OF CARE
Occupational Therapy Discharge Summary    Reason for therapy discharge:    Plan to discharge home on hospice    Progress towards therapy goal(s). See goals on Care Plan in [facility] electronic health record for goal details.  Goals partially met.  Barriers to achieving goals:   none.    Therapy recommendation(s):    No further therapy is recommended.

## 2023-08-09 NOTE — PROGRESS NOTES
Care Management Discharge Note    Discharge Date: 08/09/2023    Discharge Disposition: Home     Discharge Services: Home Infusion, Outpatient PICC line cares    Discharge DME: None    Discharge Transportation: family or friend will provide    Education Provided on the Discharge Plan: Yes  Patient/Family in Agreement with the Plan: Yes    Additional Information:  Received update from NP and SW that pt plans to discharge home today with resumption of home IV dobutamine. Pt will not enroll in hospice prior to discharge, plan for enrollment in coming weeks as an outpatient. Plan to resume home IV dobutamine at a higher dose and outpatient PICC line cares. Clarified that pt will stop outpatient IV antibiotics and will not pursue further lab draws at discharge. NP did placed order for IV bumex 3x/week if pt/family desire, they have received this in the past at [facility]. This writer called and updated [place] pharmacist at [facility] of the above and faxed discharge orders.     Update I liaison on plan for discharge today. They will work on delivery.     [facility] (resumption- IV dobutamine)  Phone: [phone]     [facility]   Phone: [phone]   Fax: [phone]   For resumption of weekly outpatient PICC line dressing changes.     CC will continue to monitor patient's medical condition and progress towards discharge..  Carmita Farnsworth RN BSN  6C Unit Care Coordinator  Phone number: [phone]  Pager: [phone]

## 2023-08-09 NOTE — PLAN OF CARE
Physical Therapy Discharge Summary    Reason for therapy discharge:    Discharged to home with eventual hospice    Progress towards therapy goal(s). See goals on Care Plan in [facility] electronic health record for goal details.  Goals partially met.  Barriers to achieving goals:   discharge from facility.    Therapy recommendation(s):    Continue home exercise program.

## 2023-08-09 NOTE — PLAN OF CARE
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 He was hypotensive in ED so started on dopamine in addition to dobutamine.     I: Monitored vitals and assessed pt status. Encouraged activity.     Changed:   Running:   dobutamine @ 5 mcg/kg/min  Bumex @ 2mg/hr  PRN:      A: A&Ox4 resting between cares. VSS- doppler maps 75-78, LVAD numbers WNL and no alarms overnight, driveline dressing CDI.  on RA. Tele shows  Paced.  Afebrile. Urinating adequately via external catheter. Denied pain.       P: Continue to monitor pt status and report changes to Cards 2 treatment team.     Palliative and Cardiology follow up scheduled in person Wednesday at 11am. Per wife this evening, she is hoping for a discharge to home on hospice Wednesday or Thursday.       [phone]

## 2023-08-09 NOTE — PROGRESS NOTES
ANTICOAGULATION  MANAGEMENT: Discharge Review    Eliseo Tanner chart reviewed for anticoagulation continuity of care    Hospital Admission on - for Fluid overload.    Discharge disposition: Home with Home Care    Results:    Recent labs: (last 7 days)     23  0510 23  0456 23  0511 23  0612 23  0626 23  0522 23  0400   INR 2.17* 2.92* 2.07* 2.01* 2.38* 2.46* 2.53*     Anticoagulation inpatient management:     See calendar    Anticoagulation discharge instructions:     Warfarin dosinmg daily   Bridging: No   INR goal change: No      Medication changes affecting anticoagulation: Yes: Patient stopped Amiodarone and Cefepime IV.  Patient is on Vanco, and Cefdinir    Additional factors affecting anticoagulation: Yes: Patient was hospitalized for over a week.  Referral to hospice when patient and family ready     PLAN     Recommend to check INR on 23    Spoke with Chrystal.  She is unsure if they are going to continue with warfarin.  They have a meeting with hospice on 23.  Chrystal asks that [facility] call her on  to follow up.    Anticoagulation Calendar updated    Gloria Abbasi RN

## 2023-08-09 NOTE — PLAN OF CARE
DISCHARGE   Discharged to: Home  Via: Automobile  Accompanied by: Family  Discharge Instructions: diet, activity, medications, follow up appointments, when to call the MD, and what to watchout for (i.e. s/s of infection, increasing SOB, palpitations, chest pain)  Prescriptions: To be filled by [facility] discharge pharmacy per pt's request; medication list reviewed & sent with pt  Follow Up Appointments: arranged; information given  Belongings: All sent with pt  IV: PIV out, PICC line stays in w/ home dobutamine infusion running  Telemetry: off  Pt exhibits understanding of above discharge instructions; all questions answered.    Marietta Richey RN

## 2023-08-09 NOTE — PROGRESS NOTES
Prior Authorization Approval    Medication: HYDROXYZINE HCL 25 MG PO TABS  PA Initiated: 8/9/2023  PA Type: Clinical HRM    Insurance: [employer] - Phone [phone] Fax [phone]  [facility] Key / Reference #: [id number] / [id number]   Authorization Effective Dates: 7/10/2023 -  until further notice    Expected CoPay: 23.49     CoPay Card Eligible: No      Filling Pharmacy: [facility] [place], [place] - [address] [place] [address]  Pharmacy Notified: Yes  Patient Notified: Yes      Lauren Willoughby  [facility] Pharmacy Liaison  Ph: [phone] Pager: [phone]   Securely message with the Vocera Web Console (learn more here)

## 2023-08-09 NOTE — PROGRESS NOTES
General ID Service: Follow-up Note          Patient:  Eliseo Tanner, Date of birth 1955, Medical record number [id number]  Date of Visit:  August 9, 2023  Reason for consult: C. Diff treatment on chronic cefepime          Assessment and Recommendations:     ID Problem list:  1. 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 History of MSSA bacteremia secondary to MSSA driveline infection  3. History of pre-op Proteus and Enterococcus bacteremia (2020)  4. History of severe Legionella pneumonia (serogroup 1L) /b  cardiogenic shock, renal failure requiring CRRT, and resp failure  requiring intubation (2021)  5. NICM s/p LVAD and ICD (placed 2/18/20) with end stage RV failure ( Acute on chronic biventricular systolic heart failure - requiring dobutamine, bumex gtt)   6. Stage IV Cirrhosis (diagnosed 5/2/23)  7. BERNARDA on CKD   8. Clostridium difficile diarrhea (8/6/23)      Discussion:  Eliseo is a 69 yo M with a PMH of chronic polymicrobial driveline infections (MSSA and PsA) on suppressive Cefepime, NICM s/p LVAD + ICD (2/2020) with RV failure, Stage IV Cirrhosis, and CKD presenting with shortness of breath and increased fluid retention.      On 8/6, he remained afebrile, but reported new onset diarrhea and developed a leukocytosis of 11.3, which had previously been elevated on admission (7/27 WBC: 11.7), but normalized. C. Diff antigen testing was positive 8/6 and he was started on oral vancomycin 125 mg QID.      He has not previously has not tested positive for C. Diff per chart review, so we would consider this his first episode of C. Diff. Given the lack of colitis symptoms on exam and imaging from CT abdomen obtained on 8/2, we have low suspicion for C Diff colitis or severe infection, he should complete the usual 10 day course of 125 mg IV Vanc QID. He continues to have a few diarrheal episodes and  lower abdominal pain 5/10. with reduced appetite in the past 2 days.      I speak with patient's wife today. family has decided to bring Eliseo home and stop Cefepime. They would like him to continue on oral vancomycin for now. Plan to transition to home hospice in the next few days.   they will meet with palliative care team at 11 am.     Recs:  1. Continue Oral Vancomycin 125 mg 4 times daily for 10 days then BID   2. stop Cefepime per family's request.     ID will sign off now     Jeff Horvath MD,M.Med.Sc.  Infectious Diseases  Pager: [phone]            Interval History:   He does not have any fevers, chills, nausea, vomiting, or increased abdominal pain. 2x diarrhea today. Has reduced appetite in the past 2 days. family has decided on bringing him home today and stop Cefepime. Wife would like him to continue on oral Vancomycin for now         Review of Systems:   Full 8 point ROS obtained, pertinent positives and negatives as above.          Current Antimicrobials   Oral Vancomycin 125 mg QID  IV Cefepime 1g QD         Physical Exam:   Ranges for vital signs:  Temp:  [97.6  F (36.4  C)-98.2  F (36.8  C)] 97.6  F (36.4  C)  Pulse:  [] 96  Resp:  [18-20] 18  SpO2:  [93 %-99 %] 93 %    Intake/Output Summary (Last 24 hours) at 8/8/2023 1048  Last data filed at 8/8/2023 0640  Gross per 24 hour   Intake 1730.04 ml   Output 2355 ml   Net -624.96 ml     Exam:  GENERAL: Lying in bed, No acute distress. Hard of hearing.  ENT:  Head is normocephalic, atraumatic.   EYES:  Sclera are anicteric  LUNGS:  Clear to auscultation.  CARDIOVASCULAR:  LVAD hum  ABDOMEN:  Distended, some tenderness with palpation, but no rebound tenderness or guarding, bowel sounds heard in all quadrants  EXT: Extremities warm. Peripheral pitting edema +1  SKIN:  No acute rashes.  Bruises on hands.  NEUROLOGIC:  Awake, alert         Laboratory Data:       Inflammatory Markers    Recent Labs   Lab Test 08/08/23  5150  08/07/23  0626 08/06/23  1759 05/27/23  0654 05/25/23  1059 03/24/23  0749 12/10/22  0616 12/08/22  1256 02/16/21  2219 02/15/21  1910   SED  --   --   --   --   --   --   --  49* 72* 47*   CRPI 8.68* 9.67* 10.20* 27.60* 14.60* 51.70* 26.30* 31.50*  --   --        Hematology Studies    Recent Labs   Lab Test 08/09/23  0400 08/08/23  0522 08/07/23  0626 08/06/23  0706 08/06/23  0612 08/05/23  0511 08/04/23  0456 03/09/21  0510 03/07/21  0543 03/06/21  0430 03/05/21  0400 03/04/21  0405 03/03/21  0455 03/02/21  0401   WBC 9.2 7.9 8.2  --  11.3* 8.5 10.1   < > 4.5 4.4 5.4 5.6 7.0 7.3   ANEU  --   --   --   --   --   --   --   --  2.6 2.8 3.6 3.8 5.3 5.6   AEOS  --   --   --   --   --   --   --   --  0.3 0.2 0.2 0.1 0.1 0.1   HGB 8.9* 8.8* 8.6* 8.5* 6.5* 8.8* 8.5*   < > 7.9* 8.0* 7.7* 8.2* 8.1* 9.2*   MCV 91 91 90  --  89 87 89   < > 82 82 81 81 80 82    187 200  --  255 216 217   < > 208 196 210 224 236 218    < > = values in this interval not displayed.       Metabolic Studies     Recent Labs   Lab Test 08/09/23  0400 08/08/23  1752 08/08/23  0522 08/07/23  1827 08/07/23  0626    136 135* 132* 134*   POTASSIUM 4.4 4.0 3.7 4.4 3.7   CHLORIDE 103 101 103 98 101   CO2 19* 20* 19* 19* 20*   BUN 77.8* 79.3* 80.9* 81.4* 84.0*   CR 3.80* 3.78* 3.72* 3.57* 3.56*   GFRESTIMATED 16* 16* 17* 18* 18*       Hepatic Studies    Recent Labs   Lab Test 08/03/23  0510 08/02/23  1806 07/27/23  1031 06/20/23  1550 05/29/23  0534 05/25/23  1059   BILITOTAL 0.3 0.4 0.4 0.4 0.6 0.6   ALKPHOS 99 102 108 135* 170* 140*   ALBUMIN 3.5 3.7 3.6 3.3* 3.7 3.1*   AST 50* 54* 64* 46* 69* 66*   ALT 34 36 39 44 50 52*       Microbiology:  Culture   Date Value Ref Range Status   06/20/2023 No Growth  Final   06/20/2023 No Growth  Final   05/27/2023 No Growth  Final   05/25/2023 No Growth  Final   05/25/2023 No Growth  Final   05/11/2023 3+ Pseudomonas aeruginosa (A)  Final   05/11/2023 2+ Pseudomonas aeruginosa (A)  Final   03/24/2023 3+  Pseudomonas aeruginosa (A)  Final   03/24/2023 3+ Pseudomonas aeruginosa (A)  Final   03/24/2023 3+ Pseudomonas aeruginosa (A)  Final   03/24/2023 1+ Staphylococcus warneri (A)  Final     Comment:     Susceptibilities not routinely done, refer to antibiogram to view typical susceptibility profiles   03/02/2023 1+ Pseudomonas aeruginosa (A)  Final   03/02/2023 1+ Pseudomonas aeruginosa (A)  Final   03/02/2023 1+ Staphylococcus aureus (A)  Corrected     Comment:     Susceptibility testing requested by Sunni Jimenez pharmacist ([id number]) to check slightly elevated vanco result. 3.8.23 at 1047  Susceptibility testing requested by Sunni Jimenez, pharmacist for dalb[facility]ancin.    03/02/2023 1+ Staphylococcus aureus (A)  Final   03/02/2023 No anaerobic organisms isolated  Final   12/11/2022 No anaerobic organisms isolated  Final   12/11/2022 No Growth  Final   12/11/2022 1+ Staphylococcus aureus (A)  Final     Comment:     Susceptibilities done on previous cultures   12/11/2022 1+ Staphylococcus aureus (A)  Final     Comment:     Susceptibilities done on previous cultures   12/08/2022 No Growth  Final   12/08/2022 2+ Staphylococcus aureus (A)  Final   12/08/2022 2+ Staphylococcus aureus (A)  Final   12/08/2022 No Growth  Final   10/18/2022 2+ Staphylococcus aureus (A)  Final   10/18/2022 2+ Staphylococcus aureus (A)  Final   09/20/2022 2+ Staphylococcus aureus (A)  Final   09/20/2022 1+ Normal freeman  Final   09/20/2022 No anaerobic organisms isolated  Final   02/10/2022 1+ Staphylococcus aureus (A)  Final   02/10/2022 1+ Cutibacterium (Propionibacterium) acnes (A)  Final     Comment:     Susceptibility testing of Cutibacterium (Propionibacterium) species is not done from this source. This organism is susceptible to penicillin and cefotaxime and most are susceptible to clindamycin.   08/17/2021 No Growth  Final   08/17/2021 No Growth  Final   08/17/2021 1+ Staphylococcus aureus (A)  Final   08/17/2021 No anaerobic organisms isolated  Final        Urine Studies    Recent Labs   Lab Test 06/20/23 2010 05/25/23  1641 05/20/23  2050 04/18/23  0548 02/16/21  0225   LEUKEST Negative Negative Negative Negative Negative   WBCU <1 1 <1 <1 0       Vancomycin Levels    Recent Labs   Lab Test 12/10/22  1948 02/16/20  0400 02/14/20  0331   VANCOMYCIN 11.3 18.5 16.2       Hepatitis B Testing   Recent Labs   Lab Test 04/27/23  0708 02/28/21  1155 02/12/20  0440   HBCAB Nonreactive  --  Nonreactive   HEPBANG Nonreactive Nonreactive Nonreactive     Hepatitis C Testing     Hepatitis C Antibody   Date Value Ref Range Status   04/27/2023 Nonreactive Nonreactive Final   02/12/2020 Nonreactive NR^Nonreactive Final     Comment:     Assay performance characteristics have not been established for newborns,   infants, and children     02/08/2020 Nonreactive NR^Nonreactive Final     Comment:     Assay performance characteristics have not been established for newborns,   infants, and children       Respiratory Virus Testing    No results found for: RS, FLUAG         Imaging:      CT Abdomen/Pelvis (8/2/23)  1. Mild to moderate ascites with fluid in the pelvis, bilateral  paracolic gutter, perihepatic and perisplenic locations, new compared to CT from 5/20/23.  2. Cholelithiasis with nondistended gallbladder and mild  pericholecystic fluid, more likely secondary to ascites, if concern  for cholecystitis ultrasound may be performed.  3. Limited evaluation for appendix with appendix not definitively  identified, fluid in the paracolic gutter and along the right lower  quadrant precludes assessment for right lower quadrant inflammatory changes with noncontrast technique.  4. Small pleural effusions, left more than right.  5. Anterior rib fractures involving the ninth and 10th ribs, possibly  acute, not seen on prior CT from 5/20/2023     XR Abdomen (8/2/23)  Nonobstructive bowel gas pattern. Small stool burden.     CXR (7/27)  Clear lungs. Support devices in place.

## 2023-08-09 NOTE — PROGRESS NOTES
[facility]  Palliative Care Daily Progress Note       Recommendations & Counseling     GOALS of CARE  Code status: DNR/DNI  Planning to discharge home with current medications (including dobutamine) with plan to transition to hospice in the coming days to week  SW will help wife Chapis contact hospice    ADVANCED CARE PLANNING  POLST completed today  -- DNR/DNI, comfort focused care  Planning for discharge back to [facility] falls later today    Symptoms:   Agree with sending patient home with simplified medication regimen and prn dilaudid and prn lorazepam per primary team      Wendy Peters MD  Fellow, palliative care    Patient seen and evaluated with Dr. Peters.   Agree with assessment and recommendations.  Medical Decision Making       MANAGEMENT DISCUSSED with the following over the past 24 hours: cardiology PA, VANITA and LVAD coordinator, and patients family   NOTE(S)/MEDICAL RECORDS REVIEWED over the past 24 hours: cardioogy   Tests ORDERED & REVIEWED in the past 24 hours:  - BMP  - CBC  SUPPLEMENTAL HISTORY, in addition to the patient's history, over the past 24 hours obtained from:   - Spouse or significant other  Medical complexity over the past 24 hours:  - Decision to DE-ESCALATE CARE based on prognosis      Jaclyn Carballo MD / Palliative Medicine   Securely message with the Vocera Web Console (learn more here)   Text page via [employer] Paging/Directory         Assessments          Eliseo Tanner is a 68 year old male with a past medical history of HFrEF 2/2 NICM s/p HM3 LVAD in 2020 now with late, severe RV failure and stage IV cirrhosis, chronic MSSA & PSA driveline infection on chronic IV abx who presented to [facility] ED on 7/27/23 with c/o shortness of breath, fatigue, and weight gain c/w fluid overload due to end stage RV failure.  He was hypotensive in ED so started on dopamine in addition to dobutamine (DA later d/c'd). Now with C.diff. (long term antibiotic ( Cefepime)  "for Chronic LVAD driveline infection ( MSSA 11/2020 and P.aeruginosa (3/2/23).      Progressive renal failure and clinical worsening despite increased dose of dobutamine and IV bumex gtt.    Today, the patient was seen for:  Goals of care  Decisional support  Family and patient support    Prognosis, Goals, or Advance Care Planning was addressed today with: Yes.  Mood, coping, and/or meaning in the context of serious illness were addressed today: Yes.              Interval History:     Chart review/discussion with unit or clinical team members:   No acute changes overnight    Per patient or family/caregivers today:  Pt  and family wants to go home and planning for hospice enrollment shortly.    Key Palliative Symptoms:  We are not managing sxs in this pt                 Medications:     I have reviewed this patient's medication profile and medications during this hospitalization.    Notable medications:dobutamine gtt and bumex gtt            Physical Exam:   BP (!) 82/60   Pulse 96   Temp 97.6  F (36.4  C) (Oral)   Resp 18   Ht 1.702 m (5' 7\")   Wt 84.1 kg (185 lb 6.5 oz)   SpO2 93%   BMI 29.04 kg/m    Gen: lying in med, falling asleep multiple times during conversation, tremulous             Data Reviewed:     Reviewed recent pertinent imaging, comments:   NA    Reviewed recent labs, comments:   Last Comprehensive Metabolic Panel:  Lab Results   Component Value Date     08/09/2023    POTASSIUM 4.4 08/09/2023    CHLORIDE 103 08/09/2023    CO2 19 (L) 08/09/2023    ANIONGAP 14 08/09/2023     (H) 08/09/2023    BUN 77.8 (H) 08/09/2023    CR 3.80 (H) 08/09/2023    GFRESTIMATED 16 (L) 08/09/2023    CALOS 8.0 (L) 08/09/2023              "

## 2023-08-09 NOTE — PLAN OF CARE
Patient with end stage RV failure transitioning to hospice    Neuro: A&Ox4. Quiet and overwhelmed by prognosis, otherwise pleasant and cooperative. Oral ativan scheduled with first dose this shift.  Cardiac: Afebrile, VSS. Doppler MAPs WNL. HM3 without alarms.   Respiratory: RA. Gets MIRANDA with small position changes. Home Cpap placed on patient at bedtime.  GI/: Voiding spontaneously in urnial. No BM this shift. Enteric precautions for C.Diff. Placed external catheter on patient at HS.  Diet/appetite: Appetite been waning per wife but tolerated cream of wheat, fruit and non dairy protein shake for dinner.Denies nausea.  Activity: Up at edge of bed for dinner. Stood to reposition self in bed. Needed Gait Belt and walker for stability, got very winded.  Pain: Abdominal pain from ascities.   Skin: Scattered bruises and scabs.  Lines: Driveline site without leaking. Dressing changed this shift.  Replacements: BMP Mg Q12, none needed this evening. On scheduled potassium  Plan: Order placed at 2200 for one time dose of Diuril, given. Palliative and Cardiology follow up scheduled in person Wednesday at 11am. Per wife this evening, she is hoping for a discharge to home on hospice Wednesday or Thursday.        Goal Outcome Evaluation:

## 2023-08-10 NOTE — LETTER
"   Mechanical Circulatory Support Program  [address], [facility] [address] [place] [address]  [phone] Office Phone  [phone] Fax Number   August 10, 2023     Dear [facility] Team:     As you may know, Eliseo Tanner ( 1955) was referred to your group. I am writing to help clarify some Left Ventricular Assist Device (LVAD) and end of life questions you may have. Comfort measures are appropriate, and we do not need LVAD parameters, weight or vitals monitored. He/She has a driveline dressing on his/her abdomen that the family may choose to continue to change regularly if they wish. Below are answers to some other frequently asked questions and pictures of the equipment are included.       When should we disconnect the LVAD?   This is left up to the patient. Patients sometimes choose to turn the pump off once they have said goodbye to loved ones and are ready to pass, even if death has not occurred or is not imminent.  It is a \"treatment\", like medications or dialysis.  Pt's can choose to stop the \"treatment\" at any time.  Others choose to turn the LVAD off when it begins alarming, usually when death has occurred or is becoming more imminent. At the point of alarming, the LVAD is not doing its job and should be disconnected to prevent alarms from being disruptive. The LVAD will sound a  low flow  alarm when flow from the heart to the LVAD drops below 2.5 liters/min. This can occur from dehydration, sepsis/ hypotension, arrhythmias, or anything else that would decrease flow to the LVAD. As the patient approaches death, once this low flow alarm starts, it will continue. It can be silenced for 2-minute intervals until the LVAD is permanently disconnected. Disconnecting the LVAD involves unplugging 3 cords and silencing an alarm (explained below with pictures).       Who disconnects the LVAD?   Whoever is available and comfortable may disconnect the LVAD. If the patient dies with " the LVAD still running, typically family members disconnect it (with our coaching). If the withdrawal of care is planned, hospice RNs (Registered Nurses) often will. Regardless, a Ventricular Assist Device (VAD) Coordinator is on call 24/7 to offer support and guidance.          What will happen to the patient when we turn the LVAD off?   If withdrawal of support is planned, it is difficult to determine how long a patient will live after the LVAD is stopped. It is determined by how ill the patient is at the time and how functional their heart is. It ranges from minutes to weeks. With Eliseo Tanner, we anticipate they will pass soon after the LVAD support is removed.        How does one disconnect the LVAD?   Stopping the LVAD is a straightforward process of disconnecting three cords (the two grey power cables and the white driveline). We encourage staff to page the on call LVAD coordinator (described below) to provide assistance over the phone. The LVAD will alarm during the process, but once all three cords are unplugged and the controller is  put to sleep,  the alarming will cease. The patient s family is well versed in LVADs and can help to identify equipment. (See photos below)      If the controller is alarming, you may silence it for 2 minutes by pushing the alarm silence button (a bell with an  x  going through it).    2.   Identify the three cords coming off of the controller: There are two grey power cords coming from the controller. Also coming out of the controller is a third cord: the white driveline cord plugs into the controller and then enters the patient s abdomen.    3.   To unplug power: Follow the two power cords down, away from the controller (approximately one foot) to the connection. One connection will be white, the other black. (see picture below)  They will either be connected to batteries or, more likely, another grey cord leading to the wall power unit. Untwist the rotating piece of the  connection and pull straight apart. Repeat with a second power cable. If the controller is not already alarming, disconnecting the power will cause the pump to temporarily alarm. You may silence it by pushing the bell with an  x  through it button.    4.  To unplug the driveline: Identify the white driveline cable. One end enters the patient's abdomen, the other is attached to the controller. Flip the controller over to see the back side. Where the driveline and controller meet, locate the safety tab. Push this to the side so you can see the red release button. Push and hold the red button and pull the driveline straight out. Once the driveline and two power cables are disconnected THE PUMP IS STOPPED (but the alarm will continue).   5.  To permanently silence the alarm on the fully disconnected controller: Press the battery button down and hold it for 5-10 seconds (you will see a countdown). This will permanently silence the alarm, or  put the controller to sleep.        What does the family do with the LVAD equipment once the patient passes?   This can be dealt with when the family chooses. We will coordinate with the family, but they may ask you. Many people choose to drop the equipment off at our clinic and we can recycle it for them. We, unfortunately, cannot use the equipment for other patients. Alternatively, the family can throw away all equipment and recycle the batteries (including the backup battery in the large, grey power module).        How can I contact the LVAD Coordinator?   We have a LVAD Coordinator on-call 24/7. It is important to know coordinators are nurses and do not have prescribing capabilities.  Eliseo Tanner s family has this number as well.  To contact a VAD Coordinator 24/7, call the hospital  at [phone] (press option 4) and ask  to page the LVAD coordinator.     Please contact us if there is anything we can do to help care for Eliseo.  For non-urgent issues, please  feel free to call his primary VAD Coordinator, Gladys Mccarthy RN at [phone]. Thank you for providing care and support to him and his family at this challenging time. We at the [facility] appreciate all that you do.      Sincerely,    Gladys Mccarthy RN, VAD Coordinator                                Power Cables

## 2023-08-10 NOTE — TELEPHONE ENCOUNTER
"Called pt to check in 1 day post hospitalization.  Pt reports feeling \"about the same\".  Pt arrived safely home yesterday accompanied by wife.  2 additional family members needed to assist pt into the house.  Pt has no complaints or questions at this time.        Called [facility] ([phone] f: [phone]) to offer information regarding LVAD and end of life.  Staff requesting info be faxed to number above.  Request completed.  Staff will call with questions after reading and talking to pt/family.  Contact information given to hospice team.  "

## 2023-08-11 NOTE — PROGRESS NOTES
ANTICOAGULATION  MANAGEMENT    Eliseo Tanner is being discharged from the [facility] Anticoagulation Management Program ([facility]).    Reason for discharge:  Eliseo has gone on hospice and decided to stop warfarin.    Anticoagulation episode resolved, ACC referral closed, and Standing order discontinued      Krysta Mcdaniel RN

## 2023-08-15 NOTE — PROGRESS NOTES
D: Called patient and wife to check in. Patient is home on hospice.    I/A:  Wife states they are doing well. She did have questions about equipement, told her that vad equipment can be recycled with electronics/battery recycling or can return to us to recycle. Explained to her there would be a number on the cardiomems pillow to call in regards to that.   She states hospice is allowing Eliseo to continue on what he had left of Dobutamine~ 4 days left.   Hospice comes M/W/F, can come more if needed and have been very helpful.   Patient is comfortable and enjoying seeing friends and family at home.  Dr. Cisneros did call and chatted w/ Eliseo    P: Encouraged patient, family to call with any questions/ concerns. Patient, Family notified to page on-call coordinator if symptoms worsen or with other concerns. Family verbalized understanding.

## 2023-08-17 NOTE — PROGRESS NOTES
Called pt to check in  Pt is settled in a home.  Currently enrolled in hospice care who visits Mondays, Wednesdays and Fridays. Pt is still on Dobutamine using last bag this week.  Supply will end in 4 days, then will be DC'd.  Pt has been able to visit with family and friends and is feeling well, overall.    Will call to check in again next week.  Pt and caregiver will call before if needed.

## 2023-08-23 NOTE — PROGRESS NOTES
Called pt/caregiver to check in.  Caregiver, Veronique, reporting that Dobutamine finished on Friday and since then, pt is becoming more uncomfortable and filling with fluid.  Pt is sleeping more too.  Encouraged Veronique to contact hospice agency to discuss medications and doses to help with pain control and shortness of breath.    Pt/caregiver verbalized understanding of the instructions given.  Will call VAD coordinator with further needs and questions.

## 2023-08-28 NOTE — TELEPHONE ENCOUNTER
Called patients SO to check in and see how Eliseo is feeling. No answer told family that they can reach out if they need anything.       Vinnie Olivera RN  Infectious Disease on 8/28/2023 at 11:02 AM

## 2023-08-30 NOTE — PROGRESS NOTES
D:  pt's wife called to update team.  She informed up that pt is sleeping most of the time and is needing more medications to keep him comfortable.  She requesting direction on how to turn the LVAD off.  I:  Discussed steps of turning the VAD off.  Explained that it can be done at any point.  Advised that it can be done by the hospice team as well with our instructions.  Veronique will discuss this timing with the rest of the family.  Offered emotional support and active listening.   A:  Check in  P:  Veronique verbalized understanding of the instructions given.  Will call VAD coordinator with further needs and questions.

## 2023-08-30 NOTE — PROGRESS NOTES
Eliseo's family has decided to turn the VAD off. Melina Cox, the hospice RN called the VAD coordinator on-call to review how to turn off the VAD.    I reviewed the instructions for turning off the VAD with Melina who had a copy of these instructions.    I told her I would call her back after the most recent dose of morphine and lorazepam took effect.    When I called back at 640pm , Melina said the family was ready to turn off the VAD and then she turned it off. Jed thanked me and the VAD team for all we do.    I encouraged Melina or the family to call back with any questions.    How does one disconnect the LVAD?   Stopping the LVAD is a straightforward process of disconnecting three cords (the two grey power cables and the white driveline). We encourage staff to page the on call LVAD coordinator (described below) to provide assistance over the phone. The LVAD will alarm during the process, but once all three cords are unplugged and the controller is  put to sleep,  the alarming will cease. The patient s family is well versed in LVADs and can help to identify equipment. (See photos below)      If the controller is alarming, you may silence it for 2 minutes by pushing the alarm silence button (a bell with an  x  going through it).    2.   Identify the three cords coming off of the controller: There are two grey power cords coming from the controller. Also coming out of the controller is a third cord: the white driveline cord plugs into the controller and then enters the patient s abdomen.    3.   To unplug power: Follow the two power cords down, away from the controller (approximately one foot) to the connection. One connection will be white, the other black. (see picture below)  They will either be connected to batteries or, more likely, another grey cord leading to the wall power unit. Untwist the rotating piece of the connection and pull straight apart. Repeat with a second power cable. If the controller  is not already alarming, disconnecting the power will cause the pump to temporarily alarm. You may silence it by pushing the bell with an  x  through it button.    4.  To unplug the driveline: Identify the white driveline cable. One end enters the patient's abdomen, the other is attached to the controller. Flip the controller over to see the back side. Where the driveline and controller meet, locate the safety tab. Push this to the side so you can see the red release button. Push and hold the red button and pull the driveline straight out. Once the driveline and two power cables are disconnected THE PUMP IS STOPPED (but the alarm will continue).   5.  To permanently silence the alarm on the fully disconnected controller: Press the battery button down and hold it for 5-10 seconds (you will see a countdown). This will permanently silence the alarm, or  put the controller to sleep.

## 2023-08-31 NOTE — TELEPHONE ENCOUNTER
Chapis called this morning to inform RN that patient passes last evening. Patient has had a hard time last few weeks and has been struggling. They made the decision to shut off the LVAD and he went peacefully per SO.     Family was with him and Chapis said he is now at peace not hurting.       Vinnie Olivera RN  Infectious Disease on 8/31/2023 at 9:27 AM'

## 2023-08-31 NOTE — TELEPHONE ENCOUNTER
Called patient to check in.   Left message no need for patient to call back.       Vinnie Olivera RN  Infectious Disease on 8/31/2023 at 8:02 AM

## 2023-09-05 ENCOUNTER — CARE COORDINATION (OUTPATIENT)
Dept: CARDIOLOGY | Facility: CLINIC | Age: 68
End: 2023-09-05

## 2023-09-05 ENCOUNTER — DOCUMENTATION ONLY (OUTPATIENT)
Dept: CARDIOLOGY | Facility: CLINIC | Age: 68
End: 2023-09-05

## 2023-09-05 NOTE — PROGRESS NOTES
Notification e-mail/call of patient's death went out to HIM, LVAD Coordinator, Elsa Saha/Jamar and Research Coordinators.    Notification e-mail of patient's death went out to cardiologist Dr. Cisneros along with information of Patient's referring cardiologist.    DOD 8/30/23    E-mail was sent out on 9/5/2023

## 2023-09-05 NOTE — PROGRESS NOTES
Called wife, Veronique, to check in.  Veronique informed me that pt had passed last week soon after VAD was stopped.  All family was present and pt passed peacefully.  Discussed how to dispose of all LVAD equipment.  Notifications sent to all appropriate parties.

## 2023-09-16 LAB
MDC_IDC_LEAD_IMPLANT_DT: NORMAL
MDC_IDC_LEAD_LOCATION: NORMAL
MDC_IDC_LEAD_LOCATION_DETAIL_1: NORMAL
MDC_IDC_LEAD_MFG: NORMAL
MDC_IDC_LEAD_MODEL: NORMAL
MDC_IDC_LEAD_POLARITY_TYPE: NORMAL
MDC_IDC_LEAD_SERIAL: NORMAL
MDC_IDC_LEAD_SPECIAL_FUNCTION: NORMAL
MDC_IDC_MSMT_BATTERY_DTM: NORMAL
MDC_IDC_MSMT_BATTERY_REMAINING_LONGEVITY: 99 MO
MDC_IDC_MSMT_BATTERY_RRT_TRIGGER: 2.73
MDC_IDC_MSMT_BATTERY_STATUS: NORMAL
MDC_IDC_MSMT_BATTERY_VOLTAGE: 2.99 V
MDC_IDC_MSMT_CAP_CHARGE_DTM: NORMAL
MDC_IDC_MSMT_CAP_CHARGE_ENERGY: 18 J
MDC_IDC_MSMT_CAP_CHARGE_TIME: 3.75
MDC_IDC_MSMT_CAP_CHARGE_TYPE: NORMAL
MDC_IDC_MSMT_LEADCHNL_RV_IMPEDANCE_VALUE: 304 OHM
MDC_IDC_MSMT_LEADCHNL_RV_IMPEDANCE_VALUE: 361 OHM
MDC_IDC_MSMT_LEADCHNL_RV_PACING_THRESHOLD_AMPLITUDE: 0.5 V
MDC_IDC_MSMT_LEADCHNL_RV_PACING_THRESHOLD_PULSEWIDTH: 0.4 MS
MDC_IDC_MSMT_LEADCHNL_RV_SENSING_INTR_AMPL: 8.25 MV
MDC_IDC_PG_IMPLANT_DTM: NORMAL
MDC_IDC_PG_MFG: NORMAL
MDC_IDC_PG_MODEL: NORMAL
MDC_IDC_PG_SERIAL: NORMAL
MDC_IDC_PG_TYPE: NORMAL
MDC_IDC_SESS_CLINIC_NAME: NORMAL
MDC_IDC_SESS_DTM: NORMAL
MDC_IDC_SESS_TYPE: NORMAL
MDC_IDC_SET_BRADY_HYSTRATE: NORMAL
MDC_IDC_SET_BRADY_LOWRATE: 30 {BEATS}/MIN
MDC_IDC_SET_BRADY_MAX_SENSOR_RATE: 115 {BEATS}/MIN
MDC_IDC_SET_BRADY_MODE: NORMAL
MDC_IDC_SET_LEADCHNL_RV_PACING_AMPLITUDE: 2 V
MDC_IDC_SET_LEADCHNL_RV_PACING_ANODE_ELECTRODE_1: NORMAL
MDC_IDC_SET_LEADCHNL_RV_PACING_ANODE_LOCATION_1: NORMAL
MDC_IDC_SET_LEADCHNL_RV_PACING_CAPTURE_MODE: NORMAL
MDC_IDC_SET_LEADCHNL_RV_PACING_CATHODE_ELECTRODE_1: NORMAL
MDC_IDC_SET_LEADCHNL_RV_PACING_CATHODE_LOCATION_1: NORMAL
MDC_IDC_SET_LEADCHNL_RV_PACING_POLARITY: NORMAL
MDC_IDC_SET_LEADCHNL_RV_PACING_PULSEWIDTH: 0.4 MS
MDC_IDC_SET_LEADCHNL_RV_SENSING_ANODE_ELECTRODE_1: NORMAL
MDC_IDC_SET_LEADCHNL_RV_SENSING_ANODE_LOCATION_1: NORMAL
MDC_IDC_SET_LEADCHNL_RV_SENSING_CATHODE_ELECTRODE_1: NORMAL
MDC_IDC_SET_LEADCHNL_RV_SENSING_CATHODE_LOCATION_1: NORMAL
MDC_IDC_SET_LEADCHNL_RV_SENSING_POLARITY: NORMAL
MDC_IDC_SET_LEADCHNL_RV_SENSING_SENSITIVITY: 0.3 MV
MDC_IDC_SET_ZONE_DETECTION_BEATS_DENOMINATOR: 40 {BEATS}
MDC_IDC_SET_ZONE_DETECTION_BEATS_NUMERATOR: 30 {BEATS}
MDC_IDC_SET_ZONE_DETECTION_INTERVAL: 270 MS
MDC_IDC_SET_ZONE_DETECTION_INTERVAL: 460 MS
MDC_IDC_SET_ZONE_DETECTION_INTERVAL: 500 MS
MDC_IDC_SET_ZONE_DETECTION_INTERVAL: NORMAL
MDC_IDC_SET_ZONE_TYPE: NORMAL
MDC_IDC_STAT_AT_BURDEN_PERCENT: 0 %
MDC_IDC_STAT_AT_DTM_END: NORMAL
MDC_IDC_STAT_AT_DTM_START: NORMAL
MDC_IDC_STAT_BRADY_DTM_END: NORMAL
MDC_IDC_STAT_BRADY_DTM_START: NORMAL
MDC_IDC_STAT_BRADY_RV_PERCENT_PACED: 0 %
MDC_IDC_STAT_EPISODE_RECENT_COUNT: 0
MDC_IDC_STAT_EPISODE_RECENT_COUNT_DTM_END: NORMAL
MDC_IDC_STAT_EPISODE_RECENT_COUNT_DTM_START: NORMAL
MDC_IDC_STAT_EPISODE_TOTAL_COUNT: 0
MDC_IDC_STAT_EPISODE_TOTAL_COUNT: 2
MDC_IDC_STAT_EPISODE_TOTAL_COUNT: 259
MDC_IDC_STAT_EPISODE_TOTAL_COUNT_DTM_END: NORMAL
MDC_IDC_STAT_EPISODE_TOTAL_COUNT_DTM_START: NORMAL
MDC_IDC_STAT_EPISODE_TYPE: NORMAL
MDC_IDC_STAT_TACHYTHERAPY_ATP_DELIVERED_RECENT: 0
MDC_IDC_STAT_TACHYTHERAPY_ATP_DELIVERED_TOTAL: 0
MDC_IDC_STAT_TACHYTHERAPY_RECENT_DTM_END: NORMAL
MDC_IDC_STAT_TACHYTHERAPY_RECENT_DTM_START: NORMAL
MDC_IDC_STAT_TACHYTHERAPY_SHOCKS_ABORTED_RECENT: 0
MDC_IDC_STAT_TACHYTHERAPY_SHOCKS_ABORTED_TOTAL: 0
MDC_IDC_STAT_TACHYTHERAPY_SHOCKS_DELIVERED_RECENT: 0
MDC_IDC_STAT_TACHYTHERAPY_SHOCKS_DELIVERED_TOTAL: 0
MDC_IDC_STAT_TACHYTHERAPY_TOTAL_DTM_END: NORMAL
MDC_IDC_STAT_TACHYTHERAPY_TOTAL_DTM_START: NORMAL

## 2023-10-06 LAB
MDC_IDC_EPISODE_DTM: NORMAL
MDC_IDC_EPISODE_DURATION: 1200 S
MDC_IDC_EPISODE_DURATION: 1560 S
MDC_IDC_EPISODE_DURATION: 1800 S
MDC_IDC_EPISODE_DURATION: 360 S
MDC_IDC_EPISODE_DURATION: 480 S
MDC_IDC_EPISODE_DURATION: 600 S
MDC_IDC_EPISODE_DURATION: 600 S
MDC_IDC_EPISODE_DURATION: 720 S
MDC_IDC_EPISODE_DURATION: 960 S
MDC_IDC_EPISODE_ID: 245
MDC_IDC_EPISODE_ID: 246
MDC_IDC_EPISODE_ID: 247
MDC_IDC_EPISODE_ID: 248
MDC_IDC_EPISODE_ID: 249
MDC_IDC_EPISODE_ID: 250
MDC_IDC_EPISODE_ID: 251
MDC_IDC_EPISODE_ID: 252
MDC_IDC_EPISODE_ID: 253
MDC_IDC_EPISODE_ID: 254
MDC_IDC_EPISODE_ID: 255
MDC_IDC_EPISODE_ID: 256
MDC_IDC_EPISODE_ID: 257
MDC_IDC_EPISODE_ID: 258
MDC_IDC_EPISODE_ID: 259
MDC_IDC_EPISODE_ID: 260
MDC_IDC_EPISODE_ID: 261
MDC_IDC_EPISODE_TYPE: NORMAL
MDC_IDC_LEAD_IMPLANT_DT: NORMAL
MDC_IDC_LEAD_LOCATION: NORMAL
MDC_IDC_LEAD_LOCATION_DETAIL_1: NORMAL
MDC_IDC_LEAD_MFG: NORMAL
MDC_IDC_LEAD_MODEL: NORMAL
MDC_IDC_LEAD_POLARITY_TYPE: NORMAL
MDC_IDC_LEAD_SERIAL: NORMAL
MDC_IDC_LEAD_SPECIAL_FUNCTION: NORMAL
MDC_IDC_MSMT_BATTERY_DTM: NORMAL
MDC_IDC_MSMT_BATTERY_REMAINING_LONGEVITY: 100 MO
MDC_IDC_MSMT_BATTERY_RRT_TRIGGER: 2.73
MDC_IDC_MSMT_BATTERY_STATUS: NORMAL
MDC_IDC_MSMT_BATTERY_VOLTAGE: 2.99 V
MDC_IDC_MSMT_CAP_CHARGE_DTM: NORMAL
MDC_IDC_MSMT_CAP_CHARGE_ENERGY: 18 J
MDC_IDC_MSMT_CAP_CHARGE_TIME: 3.74
MDC_IDC_MSMT_CAP_CHARGE_TYPE: NORMAL
MDC_IDC_MSMT_LEADCHNL_RV_IMPEDANCE_VALUE: 304 OHM
MDC_IDC_MSMT_LEADCHNL_RV_IMPEDANCE_VALUE: 399 OHM
MDC_IDC_MSMT_LEADCHNL_RV_PACING_THRESHOLD_AMPLITUDE: 0.75 V
MDC_IDC_MSMT_LEADCHNL_RV_PACING_THRESHOLD_PULSEWIDTH: 0.4 MS
MDC_IDC_MSMT_LEADCHNL_RV_SENSING_INTR_AMPL: 10.25 MV
MDC_IDC_PG_IMPLANT_DTM: NORMAL
MDC_IDC_PG_MFG: NORMAL
MDC_IDC_PG_MODEL: NORMAL
MDC_IDC_PG_SERIAL: NORMAL
MDC_IDC_PG_TYPE: NORMAL
MDC_IDC_SESS_CLINIC_NAME: NORMAL
MDC_IDC_SESS_DTM: NORMAL
MDC_IDC_SESS_TYPE: NORMAL
MDC_IDC_SET_BRADY_HYSTRATE: NORMAL
MDC_IDC_SET_BRADY_LOWRATE: 60 {BEATS}/MIN
MDC_IDC_SET_BRADY_MAX_SENSOR_RATE: 115 {BEATS}/MIN
MDC_IDC_SET_BRADY_MODE: NORMAL
MDC_IDC_SET_LEADCHNL_RV_PACING_AMPLITUDE: 2 V
MDC_IDC_SET_LEADCHNL_RV_PACING_ANODE_ELECTRODE_1: NORMAL
MDC_IDC_SET_LEADCHNL_RV_PACING_ANODE_LOCATION_1: NORMAL
MDC_IDC_SET_LEADCHNL_RV_PACING_CAPTURE_MODE: NORMAL
MDC_IDC_SET_LEADCHNL_RV_PACING_CATHODE_ELECTRODE_1: NORMAL
MDC_IDC_SET_LEADCHNL_RV_PACING_CATHODE_LOCATION_1: NORMAL
MDC_IDC_SET_LEADCHNL_RV_PACING_POLARITY: NORMAL
MDC_IDC_SET_LEADCHNL_RV_PACING_PULSEWIDTH: 0.4 MS
MDC_IDC_SET_LEADCHNL_RV_SENSING_ANODE_ELECTRODE_1: NORMAL
MDC_IDC_SET_LEADCHNL_RV_SENSING_ANODE_LOCATION_1: NORMAL
MDC_IDC_SET_LEADCHNL_RV_SENSING_CATHODE_ELECTRODE_1: NORMAL
MDC_IDC_SET_LEADCHNL_RV_SENSING_CATHODE_LOCATION_1: NORMAL
MDC_IDC_SET_LEADCHNL_RV_SENSING_POLARITY: NORMAL
MDC_IDC_SET_LEADCHNL_RV_SENSING_SENSITIVITY: 0.3 MV
MDC_IDC_SET_ZONE_DETECTION_BEATS_DENOMINATOR: 40 {BEATS}
MDC_IDC_SET_ZONE_DETECTION_BEATS_NUMERATOR: 30 {BEATS}
MDC_IDC_SET_ZONE_DETECTION_INTERVAL: 270 MS
MDC_IDC_SET_ZONE_DETECTION_INTERVAL: 460 MS
MDC_IDC_SET_ZONE_DETECTION_INTERVAL: 500 MS
MDC_IDC_SET_ZONE_DETECTION_INTERVAL: NORMAL
MDC_IDC_SET_ZONE_TYPE: NORMAL
MDC_IDC_STAT_AT_BURDEN_PERCENT: 0.3 %
MDC_IDC_STAT_AT_DTM_END: NORMAL
MDC_IDC_STAT_AT_DTM_START: NORMAL
MDC_IDC_STAT_BRADY_DTM_END: NORMAL
MDC_IDC_STAT_BRADY_DTM_START: NORMAL
MDC_IDC_STAT_BRADY_RV_PERCENT_PACED: 0.36 %
MDC_IDC_STAT_EPISODE_RECENT_COUNT: 0
MDC_IDC_STAT_EPISODE_RECENT_COUNT: 17
MDC_IDC_STAT_EPISODE_RECENT_COUNT_DTM_END: NORMAL
MDC_IDC_STAT_EPISODE_RECENT_COUNT_DTM_START: NORMAL
MDC_IDC_STAT_EPISODE_TOTAL_COUNT: 0
MDC_IDC_STAT_EPISODE_TOTAL_COUNT: 2
MDC_IDC_STAT_EPISODE_TOTAL_COUNT: 259
MDC_IDC_STAT_EPISODE_TOTAL_COUNT_DTM_END: NORMAL
MDC_IDC_STAT_EPISODE_TOTAL_COUNT_DTM_START: NORMAL
MDC_IDC_STAT_EPISODE_TYPE: NORMAL
MDC_IDC_STAT_TACHYTHERAPY_ATP_DELIVERED_RECENT: 0
MDC_IDC_STAT_TACHYTHERAPY_ATP_DELIVERED_TOTAL: 0
MDC_IDC_STAT_TACHYTHERAPY_RECENT_DTM_END: NORMAL
MDC_IDC_STAT_TACHYTHERAPY_RECENT_DTM_START: NORMAL
MDC_IDC_STAT_TACHYTHERAPY_SHOCKS_ABORTED_RECENT: 0
MDC_IDC_STAT_TACHYTHERAPY_SHOCKS_ABORTED_TOTAL: 0
MDC_IDC_STAT_TACHYTHERAPY_SHOCKS_DELIVERED_RECENT: 0
MDC_IDC_STAT_TACHYTHERAPY_SHOCKS_DELIVERED_TOTAL: 0
MDC_IDC_STAT_TACHYTHERAPY_TOTAL_DTM_END: NORMAL
MDC_IDC_STAT_TACHYTHERAPY_TOTAL_DTM_START: NORMAL

## 2024-02-29 ENCOUNTER — DOCUMENTATION ONLY (OUTPATIENT)
Dept: INFECTIOUS DISEASES | Facility: CLINIC | Age: 69
End: 2024-02-29
Payer: MEDICARE

## 2024-08-20 NOTE — CONSULTS
[facility]  WO Nurse Inpatient Assessment     Consulted for: LVAD site     Patient History (according to provider note(s):      Eliseo Tanner is a 67 year old male with a past medical history significant for chronic systolic heart failure secondary to NICM (Stage D, NYHA Class IIIA) now s/p HM 3 on 2/18/2020 moderate CAD, HTN, ABHINAV on CPAP, DM2, CKD Stage III, ANA. 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 He was admitted on 4/17/2023 for volume overload management.     Notably he has already had 2 prior admissions in 2023 for hypervolemia requiring IV diuresis, with a general decline in renal function over the last 4 months in the outpatient setting from a baseline of 1.5 to an approximate baseline of 2.1 and despite maximizing volume status by giving IV diuretics as an outpatient, he required readmission for fluid overload / worsening heart failure.      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, s/p I&D a few months ago  Wound base: 100 % slough,     Palpation of the wound bed: firm      Drainage: moderate     Description of drainage: serosanguinous, purulent, cloudy and yellow     Measurements (length x width x depth, in cm): 1.5 x 0.5 x  0.2 cm      Tunneling: N/A     Undermining: N/A  Periwound skin: Scar tissue      Color: pink      Temperature: normal   Odor: none  Pain: denies , none  Pain interventions prior to dressing change: slow and gentle cares   Treatment goal: Infection control/prevention  STATUS: initial assessment  Supplies ordered: gathered, at bedside and discussed with patient        Treatment Plan:     LVAD  driveline wound(s): Daily  remove soiled dressing and discard, apply Vashe ([id number]) soaked gauze over wound for a few minutes and remove, then use vashe moistened gauze to clean up any drainage around the tube as able, don sterile gloves per protocol, apply cut to fit hydrofera blue foam ([id number]) moistened with saline and ring out excess then apply over wound bed/around tube. Cover with two 4x4 mepilex. Keep tail end of tube secured with anchor device per protocol.      Orders: Written    RECOMMEND PRIMARY TEAM ORDER: None, at this time  Education provided: plan of care  Discussed plan of care with: Patient and Nurse  [facility] nurse follow-up plan: weekly  Notify WO if wound(s) deteriorate.  Nursing to notify the Provider(s) and re-consult the [facility] Nurse if new skin concern.    DATA:     Current support surface: Standard  Standard gel/foam mattress (IsoFlex, Atmos air, etc)  Containment of urine/stool: Continent of bladder and Continent of bowel  BMI: Body mass index is 30.98 kg/m .   Active diet order: Orders Placed This Encounter      2 Gram Sodium Diet     Output: I/O last 3 completed shifts:  In: 738.13 [P.O.:720; I.V.:18.13]  Out: 3275 [Urine:3275]     Labs:   Recent Labs   Lab 04/18/23  0535 04/17/23  1716   HGB 9.9* 10.9*   INR 2.24* 2.29*   WBC 9.7 12.7*   A1C  --  6.3*     Pressure injury risk assessment:   Sensory Perception: 4-->no impairment  Moisture: 4-->rarely moist  Activity: 3-->walks occasionally  Mobility: 4-->no limitation  Nutrition: 4-->excellent  Friction and Shear: 3-->no apparent problem  Jens Score: 22      Pager no longer is use, please contact through [place] group: [facility] Nurse  Dept. Office Number: [phone]     [Hypertension] : hypertension [Not Responding to Treatment] : not responding to treatment [ECG] : ECG [Echocardiogram] : echocardiogram [Continue] : continuing beta blockers [Increase] : increasing calcium channel blockers [Home Monitor] : a home monitor [Weight Loss] : weight loss [Low Sodium Diet] : low sodium diet [Patient] : the patient [FreeTextEntry1] : Bp elevated will inc CCB to norvasc 5 bid  Lvef 55% MILD AS PG  ~19 mmhg  [EKG obtained to assist in diagnosis and management of assessed problem(s)] : EKG obtained to assist in diagnosis and management of assessed problem(s)

## 2024-12-03 NOTE — PLAN OF CARE
D: Pt admit 2/15/21 with mixed cardiogenic and distributive shock w/intermittent wide complex tachycardia requiring intubation for hypoxemia, pt also treated for pneumonia. Persistent renal failure requiring HD. PMH NICM (LVEF 15-20%), NYHA III s/p HM3 2/18/2020 s/p ICD 3/16/2020, MSSA driveline infection and bacteremia 11/5/2020, VT, moderate non obstructive CAD, severe MR, Afib on warfarin, HTN, ABHINAV, CKD IV, DM2, HLP, HIT    I/A:   Neuro: A&Ox4. [place]  VS: VSS. RA/home CPAP overnight  LVAD #'s WNL, no alarms this shift.   Tele: ST w/BBB vs. Wide complex tachyarrhythmia   Pain: denies  GI/: Urinating into bedside urinal. Pt oliguric on HD. No BM this shift.  Diet: NPO since midnight for CHAMP, pt typically on 2g Na diet.   BG/insulin: ACHS BG checks  IV/Drips: R PIV SL. R DL PICC infusing dobutamine gtt 3 mcg/kg/min  Activity: A x1-2 w/cane  Skin/drains: R internal jugular double lumen CVC CDI.   K+ 3.9 this AM. Per pre procedure CHAMP orders, 20 mEq k+ to be administered for potassium values 3.4-4.0. Writer spoke with Cards cross cover via telephone regarding creatinine of 6.41 this AM. Order to hold dose for now.     P: Plan for CHAMP cardioversion and WOC consult (feet) today. Continue to monitor pt status and report changes to Cards 2.     Mali Mares RN     Render Risk Assessment In Note?: no Additional Notes: There may a,so be a small component of stasis dermatitis. We also recommended compression stockings daily. Discussed taking warm and not hot showers, no more vinegar or peroxide on skin, and to only use scent free and perfume free body soap (dove) and crevasse moisturizing cream immediately following shower time. Recommended all free and clear detergent as well. Detail Level: Simple

## 2025-01-02 NOTE — PLAN OF CARE
[facility]   Physical Therapy Treatment Note      Name: Italia Kirby  Clinic Number: [id number]    Patient A&O x4. VSS, on 4L NC with bubbler. No c/o SOB, but does exhibit SOB with exertion (turns). SR with occasional PACs and PVCs. [place] 58cm at hub, CVP 15-16, PAP 40s/20s. Wedge: 24.SvO2 60-51%. CI 2.1, 1.9, 1.3. Milrinone decreased and eventually turned off per MD Moreno. MD notified of worsening cardiac index and SvO2. MD Acuna ordered Dobutamine to start at 2.5 mcg/kg with a titratable dose up to 5 mcg/kg with a SvO2 goal >60%. Heparin gtt started, 1350u and bolus given for subtherapeutic Xa. Amiodarone gtt infusing at 0.5 with plans to transition to PO tomorrow, 2/9. IABP 1:1. Dressing changed.    16384 [therapeutic exercise], 37266 [neuromuscular re-education], 51617 [gait training], 01412 [manual therapy], 77821 [therapeutic activities], 55280 [attended electrical stimulation], 62911 [ultrasound], 39760 [unattended electrical stimulation], 47804 [biofeedback by any modality], and 28083 [vasopneumatic device].     Sawyer Broussard, PTA

## 2025-01-30 NOTE — Clinical Note
Aaliyah RN 2A Presented on 1/4 s/p syncopal fall.  EKG showed atrial fibrillation with RVR on admission.  ECHO on 1/6 showed EF 70%, G2DD.  Noted to have significant orthostasis in acute setting.  Continue to monitor orthostatics.  Encourage PO hydration.  Apply abdominal binder/TEDs as needed.    Follow-up/establish with Cardiology as outpatient.
